# Patient Record
Sex: MALE | Race: WHITE | NOT HISPANIC OR LATINO | Employment: OTHER | URBAN - METROPOLITAN AREA
[De-identification: names, ages, dates, MRNs, and addresses within clinical notes are randomized per-mention and may not be internally consistent; named-entity substitution may affect disease eponyms.]

---

## 2017-05-08 ENCOUNTER — CARE COORDINATION (OUTPATIENT)
Dept: CARDIOLOGY | Facility: CLINIC | Age: 62
End: 2017-05-08

## 2017-05-08 DIAGNOSIS — I42.9 CARDIOMYOPATHY, UNSPECIFIED (H): Primary | ICD-10-CM

## 2017-05-08 NOTE — PROGRESS NOTES
Pt is being referred to Dr. Montgomery for heart failure from Dr. Buddy Molina at [facility].  I have left message this afternoon for patient to call me to update his demographics in EMR and to obtain updated insurance information.  Once I speak with patient, we will coordinate an appt with Dr. Montgomery soon as she has recommended.

## 2017-05-09 ENCOUNTER — CARE COORDINATION (OUTPATIENT)
Dept: CARDIOLOGY | Facility: CLINIC | Age: 62
End: 2017-05-09

## 2017-05-09 DIAGNOSIS — I50.9 CHF (CONGESTIVE HEART FAILURE) (H): Primary | ICD-10-CM

## 2017-05-10 NOTE — PROGRESS NOTES
Spoke with pt at length yesterday.  Called back and left message that his appt with Dr. Montgomery is scheduled for this Friday, 5/12 at 9:30 am. He should arrive at 9 am for labs prior to appt.      I have called [facility] Med Rec Dept at [phone] to request his records be fax'd to us at [phone].

## 2017-05-11 ENCOUNTER — PRE VISIT (OUTPATIENT)
Dept: CARDIOLOGY | Facility: CLINIC | Age: 62
End: 2017-05-11

## 2017-05-11 DIAGNOSIS — I42.8 NICM (NONISCHEMIC CARDIOMYOPATHY) (H): ICD-10-CM

## 2017-05-11 PROBLEM — E11.9 DIABETES MELLITUS (H): Status: ACTIVE | Noted: 2017-05-11

## 2017-05-11 PROBLEM — I47.29 PAROXYSMAL VT (H): Status: ACTIVE | Noted: 2017-05-11

## 2017-05-11 PROBLEM — N18.30 CKD (CHRONIC KIDNEY DISEASE) STAGE 3, GFR 30-59 ML/MIN (H): Status: ACTIVE | Noted: 2017-05-11

## 2017-05-11 PROBLEM — F32.A DEPRESSION: Status: ACTIVE | Noted: 2017-05-11

## 2017-05-11 PROBLEM — G47.33 OSA (OBSTRUCTIVE SLEEP APNEA): Status: ACTIVE | Noted: 2017-05-11

## 2017-05-11 PROBLEM — Z95.810 ICD (IMPLANTABLE CARDIOVERTER-DEFIBRILLATOR), BIVENTRICULAR, IN SITU: Status: ACTIVE | Noted: 2017-05-11

## 2017-05-11 PROBLEM — I48.0 PAROXYSMAL ATRIAL FIBRILLATION (H): Status: ACTIVE | Noted: 2017-05-11

## 2017-05-11 PROBLEM — I10 BENIGN ESSENTIAL HYPERTENSION: Status: ACTIVE | Noted: 2017-05-11

## 2017-05-11 PROBLEM — E53.8 VITAMIN B12 DEFICIENCY (NON ANEMIC): Status: ACTIVE | Noted: 2017-05-11

## 2017-05-11 RX ORDER — LOSARTAN POTASSIUM 50 MG/1
50 TABLET ORAL
COMMUNITY
End: 2017-05-12

## 2017-05-11 RX ORDER — DIGOXIN 125 MCG
125 TABLET ORAL DAILY
Status: ON HOLD | COMMUNITY
End: 2017-06-13

## 2017-05-11 RX ORDER — IPRATROPIUM BROMIDE AND ALBUTEROL SULFATE 2.5; .5 MG/3ML; MG/3ML
3 SOLUTION RESPIRATORY (INHALATION) EVERY 4 HOURS PRN
Status: ON HOLD | COMMUNITY
End: 2020-01-23

## 2017-05-11 RX ORDER — CYANOCOBALAMIN 1000 UG/ML
1000 INJECTION, SOLUTION INTRAMUSCULAR; SUBCUTANEOUS
Status: ON HOLD | COMMUNITY
End: 2021-07-09

## 2017-05-11 RX ORDER — CITALOPRAM HYDROBROMIDE 20 MG/1
30 TABLET ORAL AT BEDTIME
Status: ON HOLD | COMMUNITY
End: 2020-09-27

## 2017-05-11 RX ORDER — WARFARIN SODIUM 5 MG/1
5 TABLET ORAL SEE ADMIN INSTRUCTIONS
Status: ON HOLD | COMMUNITY
End: 2017-06-13

## 2017-05-11 RX ORDER — BUMETANIDE 2 MG/1
2 TABLET ORAL DAILY
Status: ON HOLD | COMMUNITY
End: 2017-05-25

## 2017-05-11 RX ORDER — DIPHENHYDRAMINE HCL 25 MG
25 CAPSULE ORAL
Status: ON HOLD | COMMUNITY
End: 2017-06-30

## 2017-05-11 RX ORDER — METOPROLOL SUCCINATE 100 MG/1
100 TABLET, EXTENDED RELEASE ORAL DAILY
COMMUNITY
End: 2017-05-12

## 2017-05-11 RX ORDER — SPIRONOLACTONE 25 MG/1
25 TABLET ORAL 2 TIMES DAILY
Status: ON HOLD | COMMUNITY
End: 2017-06-13

## 2017-05-11 RX ORDER — AMIODARONE HYDROCHLORIDE 200 MG/1
100 TABLET ORAL DAILY
Status: ON HOLD | COMMUNITY
End: 2017-06-11

## 2017-05-11 RX ORDER — BENZONATATE 100 MG/1
200 CAPSULE ORAL 3 TIMES DAILY PRN
COMMUNITY
End: 2017-06-15

## 2017-05-11 RX ORDER — ALBUTEROL SULFATE 90 UG/1
2 AEROSOL, METERED RESPIRATORY (INHALATION) EVERY 4 HOURS PRN
Status: ON HOLD | COMMUNITY
End: 2021-05-31

## 2017-05-11 RX ORDER — TRAMADOL HYDROCHLORIDE 50 MG/1
100 TABLET ORAL EVERY 6 HOURS PRN
Status: ON HOLD | COMMUNITY
End: 2017-06-30

## 2017-05-11 RX ORDER — ZINC SULFATE 50(220)MG
220 CAPSULE ORAL 2 TIMES DAILY
COMMUNITY
End: 2020-08-05

## 2017-05-12 ENCOUNTER — OFFICE VISIT (OUTPATIENT)
Dept: CARDIOLOGY | Facility: CLINIC | Age: 62
End: 2017-05-12
Attending: INTERNAL MEDICINE
Payer: COMMERCIAL

## 2017-05-12 VITALS
HEIGHT: 68 IN | DIASTOLIC BLOOD PRESSURE: 63 MMHG | BODY MASS INDEX: 39.75 KG/M2 | HEART RATE: 79 BPM | SYSTOLIC BLOOD PRESSURE: 98 MMHG | WEIGHT: 262.3 LBS | OXYGEN SATURATION: 94 %

## 2017-05-12 DIAGNOSIS — I50.22 CHRONIC SYSTOLIC HEART FAILURE (H): Primary | ICD-10-CM

## 2017-05-12 DIAGNOSIS — I50.9 CHF (CONGESTIVE HEART FAILURE) (H): ICD-10-CM

## 2017-05-12 LAB
ABO + RH BLD: NORMAL
ABO + RH BLD: NORMAL
ALBUMIN SERPL-MCNC: 3.6 G/DL (ref 3.4–5)
ALP SERPL-CCNC: 73 U/L (ref 40–150)
ALT SERPL W P-5'-P-CCNC: 58 U/L (ref 0–70)
ANION GAP SERPL CALCULATED.3IONS-SCNC: 10 MMOL/L (ref 3–14)
AST SERPL W P-5'-P-CCNC: 41 U/L (ref 0–45)
BILIRUB SERPL-MCNC: 1.1 MG/DL (ref 0.2–1.3)
BLOOD BANK CMNT PATIENT-IMP: NORMAL
BUN SERPL-MCNC: 36 MG/DL (ref 7–30)
CALCIUM SERPL-MCNC: 8.4 MG/DL (ref 8.5–10.1)
CHLORIDE SERPL-SCNC: 97 MMOL/L (ref 94–109)
CO2 SERPL-SCNC: 30 MMOL/L (ref 20–32)
CREAT SERPL-MCNC: 1.38 MG/DL (ref 0.66–1.25)
GFR SERPL CREATININE-BSD FRML MDRD: 53 ML/MIN/1.7M2
GLUCOSE SERPL-MCNC: 257 MG/DL (ref 70–99)
NT-PROBNP SERPL-MCNC: 2502 PG/ML (ref 0–125)
POTASSIUM SERPL-SCNC: 4.4 MMOL/L (ref 3.4–5.3)
PROT SERPL-MCNC: 6.5 G/DL (ref 6.8–8.8)
SODIUM SERPL-SCNC: 137 MMOL/L (ref 133–144)
SPECIMEN EXP DATE BLD: NORMAL

## 2017-05-12 PROCEDURE — 83880 ASSAY OF NATRIURETIC PEPTIDE: CPT | Performed by: INTERNAL MEDICINE

## 2017-05-12 PROCEDURE — 86900 BLOOD TYPING SEROLOGIC ABO: CPT | Performed by: INTERNAL MEDICINE

## 2017-05-12 PROCEDURE — 86901 BLOOD TYPING SEROLOGIC RH(D): CPT | Performed by: INTERNAL MEDICINE

## 2017-05-12 PROCEDURE — 80053 COMPREHEN METABOLIC PANEL: CPT | Performed by: INTERNAL MEDICINE

## 2017-05-12 PROCEDURE — 99203 OFFICE O/P NEW LOW 30 MIN: CPT | Mod: ZP | Performed by: INTERNAL MEDICINE

## 2017-05-12 PROCEDURE — 36415 COLL VENOUS BLD VENIPUNCTURE: CPT | Performed by: INTERNAL MEDICINE

## 2017-05-12 PROCEDURE — 99212 OFFICE O/P EST SF 10 MIN: CPT

## 2017-05-12 RX ORDER — METOPROLOL SUCCINATE 100 MG/1
100 TABLET, EXTENDED RELEASE ORAL
Qty: 30 TABLET | Refills: 1 | Status: SHIPPED | OUTPATIENT
Start: 2017-05-12 | End: 2017-05-18

## 2017-05-12 RX ORDER — METOPROLOL SUCCINATE 100 MG/1
100 TABLET, EXTENDED RELEASE ORAL
Qty: 30 TABLET | Refills: 1 | Status: SHIPPED | OUTPATIENT
Start: 2017-05-12 | End: 2017-05-12

## 2017-05-12 RX ORDER — LOSARTAN POTASSIUM 50 MG/1
25 TABLET ORAL
Qty: 30 TABLET | Refills: 1 | Status: ON HOLD | OUTPATIENT
Start: 2017-05-12 | End: 2017-06-13

## 2017-05-12 RX ORDER — HYDRALAZINE HYDROCHLORIDE 10 MG/1
10 TABLET, FILM COATED ORAL 3 TIMES DAILY
Qty: 120 TABLET | Refills: 1 | Status: ON HOLD | OUTPATIENT
Start: 2017-05-12 | End: 2017-06-30

## 2017-05-12 ASSESSMENT — PAIN SCALES - GENERAL: PAINLEVEL: NO PAIN (0)

## 2017-05-12 NOTE — PROGRESS NOTES
Dear Buddy,     I had the pleasure of seeing Jim Willingham in the [facility] on May 11, 2017. As you know he is a pleasant 61 year old male with a long standing history of non- ischemic cardiomyopathy who has been declining, particularly over the last year.     His last measured ejection fraction was in the 20 % percent range with a dilated LV (7.7 cm) and severe MR. He was just hospitalized for decompensated heart failure last week. He overall feels that he has no energy to do the things he wants to do, it is getting harder to watch his granddaughter (who they just adopted) and he is the primary care-giver. He has been having dizziness with walking. He has had one LOC earlier this year which was not thought to be  VT. He is able to lay flat presently but last week had increasing abdominal girth, PND, and orthopnea. He is short of breath with 1/2 block of walking. He avoids stairs. His afterload agents have been reduced recently due to low blood pressures.      He has been on excellent medical therapy for some and has had a Bi_V pacer since 2008. He denies recent palpitations and has had 3 ICD shocks since his ICD was placed.       PAST MEDICAL HISTORY:  Past Medical History:   Diagnosis Date     Benign essential hypertension 5/11/2017     Cardiomyopathy, unspecified (H) 5/8/2017     CKD (chronic kidney disease) stage 3, GFR 30-59 ml/min 5/11/2017     Depression 5/11/2017     Diabetes mellitus (H) 5/11/2017     H/O gastric bypass 5/11/2017     ICD (implantable cardioverter-defibrillator), biventricular, in situ 5/11/2017     NICM (nonischemic cardiomyopathy) (H)/ EF 20% 5/11/2017    ECHO: LVEDd. 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 They have several children (from separate partners) and they have just adopted a granddaughter who he is the primary care giver.     He stopped ETOH in  2010 and would drink socially before that but never had any life difficulties related to alcohol abuse.     He stopped smoking in 1994. No ilicits.     Family history: there is no family history of premature MI or SCD, or CHF      CURRENT MEDICATIONS:  Current Outpatient Prescriptions   Medication Sig Dispense Refill     benzonatate (TESSALON PERLES) 100 MG capsule Take 200 mg by mouth 3 times daily as needed for cough       albuterol (ALBUTEROL) 108 (90 BASE) MCG/ACT Inhaler Inhale 2 puffs into the lungs every 4 hours as needed for shortness of breath / dyspnea or wheezing       ipratropium - albuterol 0.5 mg/2.5 mg/3 mL (DUONEB) 0.5-2.5 (3) MG/3ML neb solution Take 3 mLs by nebulization every 4 hours as needed for shortness of breath / dyspnea or wheezing       amiodarone (PACERONE/CODARONE) 200 MG tablet Take 100 mg by mouth daily       bumetanide (BUMEX) 2 MG tablet Take 2 mg by mouth daily       citalopram (CELEXA) 20 MG tablet Take 20 mg by mouth daily       cyanocobalamin (VITAMIN B12) 1000 MCG/ML injection Inject 1,000 mcg into the muscle every 30 days       digoxin (DIGOX) 125 MCG tablet Take 125 mcg by mouth daily       diphenhydrAMINE (BENADRYL) 25 MG capsule Take 25 mg by mouth nightly as needed for itching or allergies       fluticasone-vilanterol (BREO ELLIPTA) 200-25 MCG/INH oral inhaler Inhale 1 puff into the lungs daily       losartan (COZAAR) 50 MG tablet Take 50 mg by mouth 2 times daily       Magnesium Oxide 250 MG TABS Take 250 mg by mouth daily       metFORMIN (GLUCOPHAGE) 1000 MG tablet Take 1,000 mg by mouth daily (with dinner)       metoprolol (TOPROL XL) 100 MG 24 hr tablet Take 100 mg by mouth daily 200 mg in AM, 100 mg in  "evening       montelukast (SINGULAIR) 10 MG tablet Take 10 mg by mouth At Bedtime       spironolactone (ALDACTONE) 25 MG tablet Take 25 mg by mouth 2 times daily       traMADol (ULTRAM) 50 MG tablet Take 100 mg by mouth every 6 hours as needed for moderate pain       umeclidinium (INCRUSE ELLIPTA) 62.5 MCG/INH oral inhaler Inhale 1 puff into the lungs daily       warfarin (COUMADIN) 5 MG tablet Take 5 mg by mouth See Admin Instructions 10 mg M,W,F, 7.5 mg all other days       zinc sulfate (ZINCATE) 220 (50 ZN) MG capsule Take 220 mg by mouth 2 times daily         ROS:   Constitutional: No fever, chills, or sweats. Weight is stable at present  ENT: No visual disturbance, ear ache, epistaxis, sore throat.   Allergies/Immunologic: Negative.   Respiratory: No cough, hemoptysis.   Cardiovascular: As per HPI.   GI: No nausea, vomiting, hematemesis, melena, or hematochezia.   : No urinary frequency, dysuria, or hematuria.   Integument: Negative.   Psychiatric: worried about the state of his health    Neuro: Negative.   Endocrinology: Negative.   Musculoskeletal: Negative.    EXAM:  BP 98/63 (BP Location: Left arm, Patient Position: Chair, Cuff Size: Adult Regular)  Pulse 79  Ht 1.727 m (5' 8\")  Wt 119 kg (262 lb 4.8 oz)  SpO2 94%  BMI 39.88 kg/m2  General: appears comfortable, alert and articulate, obese   Head: normocephalic, atraumatic  Eyes: anicteric sclera, EOMI  Neck: no adenopathy  Orophyarynx: moist mucosa, no lesions, dentition intact  Heart: PMI diffuse, regular, S1/S2, no murmur, gallop, rub, estimated JVP 10 cm   Lungs: clear, no rales or wheezing  Abdomen: soft, non-tender, bowel sounds present, no hepatosplenomegaly + increased abdominal girth   Extremities: no clubbing, cyanosis, trace LE edema   Neurological: normal speech and affect, no gross motor deficits        Last coronary angiogram and RHC May 10, 2017       Pressures      Phase:Baseline      AO :  70.00 mmHg / 49.00 mmHg ( 57.00 mmHg )  @ " 10:39:59 AM            72.00 mmHg / 49.00 mmHg ( 58.00 mmHg )  @ 10:40:10 AM      LV :  83.00 mmHg / 16.00 mmHg / 23.00 mmHg  @ 10:46:50 AM      LV Pull back :  85.00 mmHg / 15.00 mmHg / 23.00 mmHg  @ 10:46:59 AM      AO Pull back :  86.00 mmHg / 51.00 mmHg ( 74.00 mmHg )  @ 10:47:06 AM      RA :  a wave = v wave = mean = 15.00 mmHg  @ 11:06:43 AM      RV :  44.00 mmHg / 15.00 mmHg / 18.00 mmHg  @ 11:06:27 AM      PA :  41.00 mmHg / 20.00 mmHg ( 32.00 mmHg )  @ 10:59:05 AM      PCW :  a wave = v wave = mean = 32.00 mmHg  @ 11:00:00 AM    Valves      Phase:Baseline      AV :  0.00 mmHg  @ 10:22:09 AM      AV Mean Gradient:  13.70 mmHg  @ 10:22:09 AM      AV Valve Area:  1.27 cm2  @ 10:22:09 AM      Cardiac Output      Phase:Baseline      Thermo Cardiac Output:  4.77 l/min  @ 10:22:09 AM    Flow      Phase:Baseline      Qp :  4.77 l/min  @ 10:22:09 AM      Qs :  4.77 l/min  @ 10:22:09 AM         Status       Last echocardiogram:    Severely increased left ventricular size. LVEDd is 7.66 cm   2. LV function is severely reduced. The visually estimated ejection fraction is 20%.   3. Septal motion consistent with conduction delay.   4. Restrictive pattern of diastolic filling consistent with severe diastolic dysfunction.   5. Severely dilated left atrium.   6. Severe mitral valve regurgitation.   7. Moderate pulmonary hypertension. PAP 48 mmHG plus RAP, or approximately 55-58 mmHG.   8. Compared to prior study (1/26/2016); The EF remains severely depressed at 20%. Mitral regurgitation has increased from moderate to severe, likely due to the patient's cardiomyopathy. PAP has increased from 22 mmHG plus RAP to 48 mmHG feliz RAP.   9. The rhythm is normal sinus.  ualized. Trace pulmonic valve regurgitation.    LVIDd (2D):     7.66 cm (3.4-5.7) LA Major diam,s, A2c 7.5 cm        Pulmonary function tests:     The Patient reportedly appeared to give maximal effort.  The PFT   testing was performed on calibrated equipment and  recorded in   compliance with the ATS/ERS Task Force Standardization of Lung   Function Testing. The PFT testing was performed in the sitting   position.    After acceptable spirograms had been obtained, the following   values were obtained and/or calculated:    Pre bronchodilator Spirometry:   SVC 2.35 L (61 %)  DLCO unc 16.89 ml/min/mmHg (59 %)  DLCOcor 17.57 ml/min/mmHg (62 %)  HGB 13.3 gm/dL    INTERPRETATION:    Slow vital capacity (SVC) is moderately reduced.    The DLCO is corrected for hemoglobin and is moderately reduced.    An isolated reduction in DLCO with normal lung volumes is seen in   patients with anemia, chronic pulmonary embolism, primary   pulmonary hypertension, carboxyhemoglobinemia, vasculitis or   early interstitial lung disease.      ECG: (per north) V pacing rate 90     Labs:    Lab Results   Component Value Date     05/12/2017    POTASSIUM 4.4 05/12/2017    CHLORIDE 97 05/12/2017    CO2 30 05/12/2017    BUN 36 (H) 05/12/2017    CR 1.38 (H) 05/12/2017     (H) 05/12/2017    NTBNP 2502 (H) 05/12/2017    AST 41 05/12/2017    ALT 58 05/12/2017    ALKPHOS 73 05/12/2017    BILITOTAL 1.1 05/12/2017       Assessment and Plan:   In summary this is a very pleasant 61 M with a history of NICM which has been long standing who is referred for consideration of advanced therapies. 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 He has decreasing neurohormonal blockade tolerance, he has several ER visits and admissions for heart failure, his LV is close to 8 cm, his renal function has been slowly worsening over time, he had a troponin elevation during his last admission, and his functional status is poor (class IIIB- IV)  despite diuresis and medical optimization. His cardiac output was also low on his right heart catheterization. I do not the mitral clip is an option in his case and it would not fix the  underlying myocardial problem.     He is definitively sick enough that we need to be thinking about LVAD/transplant evaluation. He has blood group O (longest wait for cardiac transplant) and his BMI is likely going to be prohibitive for a direct to transplant approach in the time frame that he is going to need advanced therapies (our cut of is less than BMI 37).    What I would propose is the following. I would like to admit him for medical optimization as well as and work up for both LVAD and transplant. Given he is such a young man without severe co-morbidities our goal will be eventual transplant in his case, if possible.     1 hour was spent discussing the work up for advanced therapies, introducing the concepts of LVAD and transplant. We will continue to discuss all of this going forward.     Other:   VT: on low dose amio, will need repeat PFTs,thyroid function     History of paroxysmal a fib: not on anti-coag- will need to find out why- it is possible that he has been in sinus for a long time     History of asthma/COPD: will need repeat PFTs as above although last tests were not prohibitive for LVAD/transplant and he is not on oxygen       Medication changes:    Decrease metoprolol to 100 mg twice a day.   Start hydralazine 10 mg three times a day     We will reach out to him for admission next week and please call me with any questions.     Delisa Montgomery MD              CC    Chase Molina MD   [facility]

## 2017-05-12 NOTE — LETTER
5/12/2017      RE: Jim Willingham  [address] [place] [id number]       Dear Colleague,    Thank you for the opportunity to participate in the care of your patient, Jim Willingham, at the [facility]. Please see a copy of my visit note below.      Dear Buddy,     I had the pleasure of seeing Jim Willingham in the [facility] on May 11, 2017. As you know he is a pleasant 61 year old male with a long standing history of non- ischemic cardiomyopathy who has been declining, particularly over the last year.     His last measured ejection fraction was in the 20 % percent range with a dilated LV (7.7 cm) and severe MR. He was just hospitalized for decompensated heart failure last week. He overall feels that he has no energy to do the things he wants to do, it is getting harder to watch his granddaughter (who they just adopted) and he is the primary care-giver. He has been having dizziness with walking. He has had one LOC earlier this year which was not thought to be  VT. He is able to lay flat presently but last week had increasing abdominal girth, PND, and orthopnea. He is short of breath with 1/2 block of walking. He avoids stairs. His afterload agents have been reduced recently due to low blood pressures.      He has been on excellent medical therapy for some and has had a Bi_V pacer since 2008. He denies recent palpitations and has had 3 ICD shocks since his ICD was placed.       PAST MEDICAL HISTORY:  Past Medical History:   Diagnosis Date     Benign essential hypertension 5/11/2017     Cardiomyopathy, unspecified (H) 5/8/2017     CKD (chronic kidney disease) stage 3, GFR 30-59 ml/min 5/11/2017     Depression 5/11/2017     Diabetes mellitus (H) 5/11/2017     H/O gastric bypass 5/11/2017     ICD (implantable cardioverter-defibrillator), biventricular, in situ 5/11/2017     NICM (nonischemic cardiomyopathy) (H)/  EF 20% 5/11/2017    ECHO: LVEDd. 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 They have several children (from separate partners) and they have just adopted a granddaughter who he is the primary care giver.     He stopped ETOH in  2010 and would drink socially before that but never had any life difficulties related to alcohol abuse.     He stopped smoking in 1994. No ilicits.     Family history: there is no family history of premature MI or SCD, or CHF      CURRENT MEDICATIONS:  Current Outpatient Prescriptions   Medication Sig Dispense Refill     benzonatate (TESSALON PERLES) 100 MG capsule Take 200 mg by mouth 3 times daily as needed for cough       albuterol (ALBUTEROL) 108 (90 BASE) MCG/ACT Inhaler Inhale 2 puffs into the lungs every 4 hours as needed for shortness of breath / dyspnea or wheezing       ipratropium - albuterol 0.5 mg/2.5 mg/3 mL (DUONEB) 0.5-2.5 (3) MG/3ML neb solution Take 3 mLs by nebulization every 4 hours as needed for shortness of breath / dyspnea or wheezing       amiodarone (PACERONE/CODARONE) 200 MG tablet Take 100 mg by mouth daily       bumetanide (BUMEX) 2 MG tablet Take 2 mg by mouth daily       citalopram (CELEXA) 20 MG tablet Take 20 mg by mouth daily       cyanocobalamin (VITAMIN B12) 1000 MCG/ML injection Inject 1,000 mcg into the muscle every 30 days       digoxin (DIGOX) 125 MCG tablet Take 125 mcg by mouth daily       diphenhydrAMINE (BENADRYL) 25 MG capsule Take 25 mg by mouth nightly as needed for itching or allergies       fluticasone-vilanterol (BREO ELLIPTA) 200-25 MCG/INH oral inhaler Inhale 1 puff into the lungs daily       losartan  "(COZAAR) 50 MG tablet Take 50 mg by mouth 2 times daily       Magnesium Oxide 250 MG TABS Take 250 mg by mouth daily       metFORMIN (GLUCOPHAGE) 1000 MG tablet Take 1,000 mg by mouth daily (with dinner)       metoprolol (TOPROL XL) 100 MG 24 hr tablet Take 100 mg by mouth daily 200 mg in AM, 100 mg in evening       montelukast (SINGULAIR) 10 MG tablet Take 10 mg by mouth At Bedtime       spironolactone (ALDACTONE) 25 MG tablet Take 25 mg by mouth 2 times daily       traMADol (ULTRAM) 50 MG tablet Take 100 mg by mouth every 6 hours as needed for moderate pain       umeclidinium (INCRUSE ELLIPTA) 62.5 MCG/INH oral inhaler Inhale 1 puff into the lungs daily       warfarin (COUMADIN) 5 MG tablet Take 5 mg by mouth See Admin Instructions 10 mg M,W,F, 7.5 mg all other days       zinc sulfate (ZINCATE) 220 (50 ZN) MG capsule Take 220 mg by mouth 2 times daily         ROS:   Constitutional: No fever, chills, or sweats. Weight is stable at present  ENT: No visual disturbance, ear ache, epistaxis, sore throat.   Allergies/Immunologic: Negative.   Respiratory: No cough, hemoptysis.   Cardiovascular: As per HPI.   GI: No nausea, vomiting, hematemesis, melena, or hematochezia.   : No urinary frequency, dysuria, or hematuria.   Integument: Negative.   Psychiatric: worried about the state of his health    Neuro: Negative.   Endocrinology: Negative.   Musculoskeletal: Negative.    EXAM:  BP 98/63 (BP Location: Left arm, Patient Position: Chair, Cuff Size: Adult Regular)  Pulse 79  Ht 1.727 m (5' 8\")  Wt 119 kg (262 lb 4.8 oz)  SpO2 94%  BMI 39.88 kg/m2  General: appears comfortable, alert and articulate, obese   Head: normocephalic, atraumatic  Eyes: anicteric sclera, EOMI  Neck: no adenopathy  Orophyarynx: moist mucosa, no lesions, dentition intact  Heart: PMI diffuse, regular, S1/S2, no murmur, gallop, rub, estimated JVP 10 cm   Lungs: clear, no rales or wheezing  Abdomen: soft, non-tender, bowel sounds present, no " hepatosplenomegaly + increased abdominal girth   Extremities: no clubbing, cyanosis, trace LE edema   Neurological: normal speech and affect, no gross motor deficits        Last coronary angiogram and RHC May 10, 2017       Pressures      Phase:Baseline      AO :  70.00 mmHg / 49.00 mmHg ( 57.00 mmHg )  @ 10:39:59 AM            72.00 mmHg / 49.00 mmHg ( 58.00 mmHg )  @ 10:40:10 AM      LV :  83.00 mmHg / 16.00 mmHg / 23.00 mmHg  @ 10:46:50 AM      LV Pull back :  85.00 mmHg / 15.00 mmHg / 23.00 mmHg  @ 10:46:59 AM      AO Pull back :  86.00 mmHg / 51.00 mmHg ( 74.00 mmHg )  @ 10:47:06 AM      RA :  a wave = v wave = mean = 15.00 mmHg  @ 11:06:43 AM      RV :  44.00 mmHg / 15.00 mmHg / 18.00 mmHg  @ 11:06:27 AM      PA :  41.00 mmHg / 20.00 mmHg ( 32.00 mmHg )  @ 10:59:05 AM      PCW :  a wave = v wave = mean = 32.00 mmHg  @ 11:00:00 AM    Valves      Phase:Baseline      AV :  0.00 mmHg  @ 10:22:09 AM      AV Mean Gradient:  13.70 mmHg  @ 10:22:09 AM      AV Valve Area:  1.27 cm2  @ 10:22:09 AM      Cardiac Output      Phase:Baseline      Thermo Cardiac Output:  4.77 l/min  @ 10:22:09 AM    Flow      Phase:Baseline      Qp :  4.77 l/min  @ 10:22:09 AM      Qs :  4.77 l/min  @ 10:22:09 AM         Status       Last echocardiogram:    Severely increased left ventricular size. LVEDd is 7.66 cm   2. LV function is severely reduced. The visually estimated ejection fraction is 20%.   3. Septal motion consistent with conduction delay.   4. Restrictive pattern of diastolic filling consistent with severe diastolic dysfunction.   5. Severely dilated left atrium.   6. Severe mitral valve regurgitation.   7. Moderate pulmonary hypertension. PAP 48 mmHG plus RAP, or approximately 55-58 mmHG.   8. Compared to prior study (1/26/2016); The EF remains severely depressed at 20%. Mitral regurgitation has increased from moderate to severe, likely due to the patient's cardiomyopathy. PAP has increased from 22 mmHG plus RAP to 48 mmHG feliz  RAP.   9. The rhythm is normal sinus.  ualized. Trace pulmonic valve regurgitation.    LVIDd (2D):     7.66 cm (3.4-5.7) LA Major diam,s, A2c 7.5 cm        Pulmonary function tests:     The Patient reportedly appeared to give maximal effort.  The PFT   testing was performed on calibrated equipment and recorded in   compliance with the ATS/ERS Task Force Standardization of Lung   Function Testing. The PFT testing was performed in the sitting   position.    After acceptable spirograms had been obtained, the following   values were obtained and/or calculated:    Pre bronchodilator Spirometry:   SVC 2.35 L (61 %)  DLCO unc 16.89 ml/min/mmHg (59 %)  DLCOcor 17.57 ml/min/mmHg (62 %)  HGB 13.3 gm/dL    INTERPRETATION:    Slow vital capacity (SVC) is moderately reduced.    The DLCO is corrected for hemoglobin and is moderately reduced.    An isolated reduction in DLCO with normal lung volumes is seen in   patients with anemia, chronic pulmonary embolism, primary   pulmonary hypertension, carboxyhemoglobinemia, vasculitis or   early interstitial lung disease.      ECG: (per north) V pacing rate 90     Labs:    Lab Results   Component Value Date     05/12/2017    POTASSIUM 4.4 05/12/2017    CHLORIDE 97 05/12/2017    CO2 30 05/12/2017    BUN 36 (H) 05/12/2017    CR 1.38 (H) 05/12/2017     (H) 05/12/2017    NTBNP 2502 (H) 05/12/2017    AST 41 05/12/2017    ALT 58 05/12/2017    ALKPHOS 73 05/12/2017    BILITOTAL 1.1 05/12/2017       Assessment and Plan:   In summary this is a very pleasant 61 M with a history of NICM which has been long standing who is referred for consideration of advanced therapies. 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 He has decreasing neurohormonal blockade tolerance, he has several ER visits and admissions for heart failure, his LV is close to 8 cm, his renal function has been slowly worsening  over time, he had a troponin elevation during his last admission, and his functional status is poor (class IIIB- IV)  despite diuresis and medical optimization. His cardiac output was also low on his right heart catheterization. I do not the mitral clip is an option in his case and it would not fix the underlying myocardial problem.     He is definitively sick enough that we need to be thinking about LVAD/transplant evaluation. He has blood group O (longest wait for cardiac transplant) and his BMI is likely going to be prohibitive for a direct to transplant approach in the time frame that he is going to need advanced therapies (our cut of is less than BMI 37).    What I would propose is the following. I would like to admit him for medical optimization as well as and work up for both LVAD and transplant. Given he is such a young man without severe co-morbidities our goal will be eventual transplant in his case, if possible.     1 hour was spent discussing the work up for advanced therapies, introducing the concepts of LVAD and transplant. We will continue to discuss all of this going forward.     Other:   VT: on low dose amio, will need repeat PFTs,thyroid function     History of paroxysmal a fib: not on anti-coag- will need to find out why- it is possible that he has been in sinus for a long time     History of asthma/COPD: will need repeat PFTs as above although last tests were not prohibitive for LVAD/transplant and he is not on oxygen       Medication changes:    Decrease metoprolol to 100 mg twice a day.   Start hydralazine 10 mg three times a day     We will reach out to him for admission next week and please call me with any questions.     Delisa Montgomery MD         CC    Chase Molina MD   [facility]

## 2017-05-12 NOTE — PATIENT INSTRUCTIONS
Thanks for coming in today.     Decrease metoprolol to 100 mg twice a day.   Start hydralazine 10 mg three times a day.     We will be reaching out to you to set up a time for admission.     [phone], option 3,1       Delisa Montgomery

## 2017-05-12 NOTE — MR AVS SNAPSHOT
"              After Visit Summary   5/12/2017    Jim Willingham    MRN: [id number]           Patient Information     Date Of Birth          1955        Visit Information        Provider Department      5/12/2017 9:30 AM Delisa Montgomery MD [facility]        Today's Diagnoses     Chronic systolic heart failure (H)    -  1      Care Instructions    Thanks for coming in today.     Decrease metoprolol to 100 mg twice a day.   Start hydralazine 10 mg three times a day.     We will be reaching out to you to set up a time for admission.     [phone], option 3,1       Delisa Montgomery          Follow-ups after your visit        Who to contact     If you have questions or need follow up information about today's clinic visit or your schedule please contact [facility] directly at [phone].  Normal or non-critical lab and imaging results will be communicated to you by Dothart, letter or phone within 4 business days after the clinic has received the results. If you do not hear from us within 7 days, please contact the clinic through Dothart or phone. If you have a critical or abnormal lab result, we will notify you by phone as soon as possible.  Submit refill requests through Adspringr or call your pharmacy and they will forward the refill request to us. Please allow 3 business days for your refill to be completed.          Additional Information About Your Visit        Dothart Information     Adspringr lets you send messages to your doctor, view your test results, renew your prescriptions, schedule appointments and more. To sign up, go to [email or web address] . Click on \"Log in\" on the left side of the screen, which will take you to the Welcome page. Then click on \"Sign up Now\" on the right side of the page.     You will be asked to enter the access code listed below, as well as some personal information. Please follow the directions to create your username and password.     Your " "access code is: [email or web address]  Expires: 2017  6:30 AM     Your access code will  in 90 days. If you need help or a new code, please call your [place] clinic or [phone].        Care EveryWhere ID     This is your Care EveryWhere ID. This could be used by other organizations to access your [place] medical records  [id number]        Your Vitals Were     Pulse Height Pulse Oximetry BMI (Body Mass Index)          79 1.727 m (5' 8\") 94% 39.88 kg/m2         Blood Pressure from Last 3 Encounters:   17 98/63    Weight from Last 3 Encounters:   17 119 kg (262 lb 4.8 oz)              Today, you had the following     No orders found for display         Today's Medication Changes          These changes are accurate as of: 17 11:02 AM.  If you have any questions, ask your nurse or doctor.               Start taking these medicines.        Dose/Directions    hydrALAZINE 10 MG tablet   Commonly known as:  APRESOLINE   Used for:  Chronic systolic heart failure (H)   Started by:  Delisa Montgomery MD        Dose:  10 mg   Take 1 tablet (10 mg) by mouth 3 times daily   Quantity:  120 tablet   Refills:  1       metoprolol 100 MG 24 hr tablet   Commonly known as:  TOPROL XL   Indication:  2 tabs in AM, 1 tab in evening   Used for:  Chronic systolic heart failure (H)   Started by:  Delisa Montgomery MD        Dose:  100 mg   Take 1 tablet (100 mg) by mouth 2 times daily   Quantity:  30 tablet   Refills:  1         These medicines have changed or have updated prescriptions.        Dose/Directions    losartan 50 MG tablet   Commonly known as:  COZAAR   This may have changed:  how much to take   Used for:  Chronic systolic heart failure (H)   Changed by:  Delisa Montgomery MD        Dose:  25 mg   Take 0.5 tablets (25 mg) by mouth 2 times daily   Quantity:  30 tablet   Refills:  1            Where to get your medicines      These medications were sent to [id number] - " [place] - [address] [place] AT [facility] [address]  [address] [place] [address]     Phone:  [phone]     hydrALAZINE 10 MG tablet    losartan 50 MG tablet    metoprolol 100 MG 24 hr tablet                Primary Care Provider    None Specified       No primary provider on file.        Thank you!     Thank you for choosing [facility]  for your care. Our goal is always to provide you with excellent care. Hearing back from our patients is one way we can continue to improve our services. Please take a few minutes to complete the written survey that you may receive in the mail after your visit with us. Thank you!             Your Updated Medication List - Protect others around you: Learn how to safely use, store and throw away your medicines at www.disposemymeds.org.          This list is accurate as of: 5/12/17 11:02 AM.  Always use your most recent med list.                   Brand Name Dispense Instructions for use    albuterol 108 (90 BASE) MCG/ACT Inhaler   Generic drug:  albuterol      Inhale 2 puffs into the lungs every 4 hours as needed for shortness of breath / dyspnea or wheezing       amiodarone 200 MG tablet    PACERONE/CODARONE     Take 100 mg by mouth daily       BENADRYL 25 MG capsule   Generic drug:  diphenhydrAMINE      Take 25 mg by mouth nightly as needed for itching or allergies       BREO ELLIPTA 200-25 MCG/INH oral inhaler   Generic drug:  fluticasone-vilanterol      Inhale 1 puff into the lungs daily       BUMEX 2 MG tablet   Generic drug:  bumetanide      Take 2 mg by mouth daily       celeXA 20 MG tablet   Generic drug:  citalopram      Take 20 mg by mouth daily       cyanocobalamin 1000 MCG/ML injection    VITAMIN B12     Inject 1,000 mcg into the muscle every 30 days       DIGOX 125 MCG tablet   Generic drug:  digoxin      Take 125 mcg by mouth daily       GLUCOPHAGE 1000 MG tablet   Generic drug:  metFORMIN      Take 1,000 mg by mouth daily (with  dinner)       hydrALAZINE 10 MG tablet    APRESOLINE    120 tablet    Take 1 tablet (10 mg) by mouth 3 times daily       INCRUSE ELLIPTA 62.5 MCG/INH oral inhaler   Generic drug:  umeclidinium      Inhale 1 puff into the lungs daily       ipratropium - albuterol 0.5 mg/2.5 mg/3 mL 0.5-2.5 (3) MG/3ML neb solution    DUONEB     Take 3 mLs by nebulization every 4 hours as needed for shortness of breath / dyspnea or wheezing       losartan 50 MG tablet    COZAAR    30 tablet    Take 0.5 tablets (25 mg) by mouth 2 times daily       Magnesium Oxide 250 MG Tabs      Take 250 mg by mouth daily       metoprolol 100 MG 24 hr tablet    TOPROL XL    30 tablet    Take 1 tablet (100 mg) by mouth 2 times daily       SINGULAIR 10 MG tablet   Generic drug:  montelukast      Take 10 mg by mouth At Bedtime       spironolactone 25 MG tablet    ALDACTONE     Take 25 mg by mouth 2 times daily       TESSALON PERLES 100 MG capsule   Generic drug:  benzonatate      Take 200 mg by mouth 3 times daily as needed for cough       traMADol 50 MG tablet    ULTRAM     Take 100 mg by mouth every 6 hours as needed for moderate pain       warfarin 5 MG tablet    COUMADIN     Take 5 mg by mouth See Admin Instructions 10 mg M,W,F, 7.5 mg all other days       zinc sulfate 220 (50 ZN) MG capsule    ZINCATE     Take 220 mg by mouth 2 times daily

## 2017-05-12 NOTE — NURSING NOTE
Chief Complaint   Patient presents with     New Patient     Ref. Dr. Molina at [place] for evaluation of HF

## 2017-05-17 DIAGNOSIS — I50.20 SYSTOLIC HEART FAILURE (H): Primary | ICD-10-CM

## 2017-05-17 RX ORDER — LIDOCAINE 40 MG/G
CREAM TOPICAL
Status: CANCELLED | OUTPATIENT
Start: 2017-05-17

## 2017-05-17 NOTE — PROGRESS NOTES
"Needs VAD/Transplant evaluation:  [facility] referral from Dr Buddy Molina, seen by Dr Montgomery in clinic last week.  Her recommendation is that Mr Willingham undergo a formal VAD/Transplant evaluation.  Will plan for right heart cath and admit for \"tune-up\" of his heart failure, with simultaneous hybrid evaluation.  The patient's spouse is off work for a week beginning 5/23, so it would be most convenient for the family to start the eval process that week. As he is on Coumadin for a history of atrial arrhythmias, the pt will be instructed to take his last dose of coumadin on Sat. 5-20-17.  VAD team aware of above as well.    "

## 2017-05-18 DIAGNOSIS — I50.22 CHRONIC SYSTOLIC HEART FAILURE (H): ICD-10-CM

## 2017-05-18 RX ORDER — METOPROLOL SUCCINATE 100 MG/1
100 TABLET, EXTENDED RELEASE ORAL
Qty: 60 TABLET | Refills: 5 | Status: ON HOLD | OUTPATIENT
Start: 2017-05-18 | End: 2017-06-13

## 2017-05-18 NOTE — TELEPHONE ENCOUNTER
Last visit 5/12/17 [place]  Next visit 5/23/17 [place]  Plan  Assessment and Plan:   In summary this is a very pleasant 61 M with a history of NICM which has been long standing who is referred for consideration of advanced therapies. 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 He has decreasing neurohormonal blockade tolerance, he has several ER visits and admissions for heart failure, his LV is close to 8 cm, his renal function has been slowly worsening over time, he had a troponin elevation during his last admission, and his functional status is poor (class IIIB- IV) despite diuresis and medical optimization. His cardiac output was also low on his right heart catheterization. I do not the mitral clip is an option in his case and it would not fix the underlying myocardial problem.      He is definitively sick enough that we need to be thinking about LVAD/transplant evaluation. He has blood group O (longest wait for cardiac transplant) and his BMI is likely going to be prohibitive for a direct to transplant approach in the time frame that he is going to need advanced therapies (our cut of is less than BMI 37).     What I would propose is the following. I would like to admit him for medical optimization as well as and work up for both LVAD and transplant. Given he is such a young man without severe co-morbidities our goal will be eventual transplant in his case, if possible.      1 hour was spent discussing the work up for advanced therapies, introducing the concepts of LVAD and transplant. We will continue to discuss all of this going forward.      Other:   VT: on low dose amio, will need repeat PFTs,thyroid function      History of paroxysmal a fib: not on anti-coag- will need to find out why- it is possible that he has been in sinus for a long time      History of asthma/COPD: will need repeat PFTs as above although  last tests were not prohibitive for LVAD/transplant and he is not on oxygen         Medication changes:   Decrease metoprolol to 100 mg twice a day.   Start hydralazine 10 mg three times a day      We will reach out to him for admission next week and please call me with any questions.      Delisa Montgomery MD

## 2017-05-18 NOTE — TELEPHONE ENCOUNTER
Metoprolol ER succinate 100 mg    Take 1 tablet by mouth twice 200 mg in am and 100 mg every evening

## 2017-05-23 ENCOUNTER — HOSPITAL ENCOUNTER (INPATIENT)
Facility: CLINIC | Age: 62
LOS: 3 days | Discharge: HOME | DRG: 287 | End: 2017-05-26
Attending: INTERNAL MEDICINE | Admitting: INTERNAL MEDICINE
Payer: COMMERCIAL

## 2017-05-23 ENCOUNTER — APPOINTMENT (OUTPATIENT)
Dept: CT IMAGING | Facility: CLINIC | Age: 62
DRG: 287 | End: 2017-05-23
Attending: INTERNAL MEDICINE
Payer: COMMERCIAL

## 2017-05-23 ENCOUNTER — APPOINTMENT (OUTPATIENT)
Dept: GENERAL RADIOLOGY | Facility: CLINIC | Age: 62
DRG: 287 | End: 2017-05-23
Attending: INTERNAL MEDICINE
Payer: COMMERCIAL

## 2017-05-23 ENCOUNTER — APPOINTMENT (OUTPATIENT)
Dept: CARDIOLOGY | Facility: CLINIC | Age: 62
DRG: 287 | End: 2017-05-23
Attending: INTERNAL MEDICINE
Payer: COMMERCIAL

## 2017-05-23 ENCOUNTER — APPOINTMENT (OUTPATIENT)
Dept: MEDSURG UNIT | Facility: CLINIC | Age: 62
DRG: 287 | End: 2017-05-23
Payer: COMMERCIAL

## 2017-05-23 DIAGNOSIS — I42.8 NICM (NONISCHEMIC CARDIOMYOPATHY) (H): Primary | ICD-10-CM

## 2017-05-23 DIAGNOSIS — I50.20 SYSTOLIC HEART FAILURE (H): ICD-10-CM

## 2017-05-23 DIAGNOSIS — M25.522 LEFT ELBOW PAIN: ICD-10-CM

## 2017-05-23 PROBLEM — I50.9 CHF (CONGESTIVE HEART FAILURE) (H): Status: ACTIVE | Noted: 2017-05-23

## 2017-05-23 LAB
ALBUMIN SERPL-MCNC: 3.5 G/DL (ref 3.4–5)
ALBUMIN UR-MCNC: NEGATIVE MG/DL
ALP SERPL-CCNC: 72 U/L (ref 40–150)
ALT SERPL W P-5'-P-CCNC: 44 U/L (ref 0–70)
ANION GAP SERPL CALCULATED.3IONS-SCNC: 8 MMOL/L (ref 3–14)
APPEARANCE UR: CLEAR
AST SERPL W P-5'-P-CCNC: 26 U/L (ref 0–45)
BILIRUB SERPL-MCNC: 1.1 MG/DL (ref 0.2–1.3)
BILIRUB UR QL STRIP: NEGATIVE
BUN SERPL-MCNC: 46 MG/DL (ref 7–30)
CALCIUM SERPL-MCNC: 8.5 MG/DL (ref 8.5–10.1)
CHLORIDE SERPL-SCNC: 101 MMOL/L (ref 94–109)
CO2 SERPL-SCNC: 27 MMOL/L (ref 20–32)
COLOR UR AUTO: YELLOW
CREAT SERPL-MCNC: 1.52 MG/DL (ref 0.66–1.25)
CRP SERPL-MCNC: 29 MG/L (ref 0–8)
ERYTHROCYTE [DISTWIDTH] IN BLOOD BY AUTOMATED COUNT: 14 % (ref 10–15)
GFR SERPL CREATININE-BSD FRML MDRD: 47 ML/MIN/1.7M2
GLUCOSE BLDC GLUCOMTR-MCNC: 107 MG/DL (ref 70–99)
GLUCOSE BLDC GLUCOMTR-MCNC: 95 MG/DL (ref 70–99)
GLUCOSE SERPL-MCNC: 123 MG/DL (ref 70–99)
GLUCOSE UR STRIP-MCNC: NEGATIVE MG/DL
GRAM STN SPEC: ABNORMAL
HCT VFR BLD AUTO: 36.2 % (ref 40–53)
HGB BLD-MCNC: 11.3 G/DL (ref 13.3–17.7)
HGB UR QL STRIP: NEGATIVE
INR PPP: 1.36 (ref 0.86–1.14)
KETONES UR STRIP-MCNC: NEGATIVE MG/DL
LEUKOCYTE ESTERASE UR QL STRIP: NEGATIVE
Lab: ABNORMAL
MAGNESIUM SERPL-MCNC: 1.7 MG/DL (ref 1.6–2.3)
MCH RBC QN AUTO: 29.8 PG (ref 26.5–33)
MCHC RBC AUTO-ENTMCNC: 31.2 G/DL (ref 31.5–36.5)
MCV RBC AUTO: 96 FL (ref 78–100)
MICRO REPORT STATUS: ABNORMAL
NITRATE UR QL: NEGATIVE
NT-PROBNP SERPL-MCNC: 1832 PG/ML (ref 0–900)
PH UR STRIP: 5.5 PH (ref 5–7)
PLATELET # BLD AUTO: 151 10E9/L (ref 150–450)
POTASSIUM SERPL-SCNC: 4.2 MMOL/L (ref 3.4–5.3)
PROT SERPL-MCNC: 6.6 G/DL (ref 6.8–8.8)
RBC # BLD AUTO: 3.79 10E12/L (ref 4.4–5.9)
RBC #/AREA URNS AUTO: <1 /HPF (ref 0–2)
SODIUM SERPL-SCNC: 135 MMOL/L (ref 133–144)
SP GR UR STRIP: 1.01 (ref 1–1.03)
SPECIMEN SOURCE: ABNORMAL
URN SPEC COLLECT METH UR: NORMAL
UROBILINOGEN UR STRIP-MCNC: 2 MG/DL (ref 0–2)
WBC # BLD AUTO: 7.5 10E9/L (ref 4–11)
WBC #/AREA URNS AUTO: <1 /HPF (ref 0–2)

## 2017-05-23 PROCEDURE — 27210807 ZZH SHEATH CR6

## 2017-05-23 PROCEDURE — 71020 XR CHEST 2 VW: CPT

## 2017-05-23 PROCEDURE — 81001 URINALYSIS AUTO W/SCOPE: CPT | Performed by: INTERNAL MEDICINE

## 2017-05-23 PROCEDURE — 00000146 ZZHCL STATISTIC GLUCOSE BY METER IP

## 2017-05-23 PROCEDURE — 83735 ASSAY OF MAGNESIUM: CPT | Performed by: INTERNAL MEDICINE

## 2017-05-23 PROCEDURE — 27210982 ZZH KIT RT HC TOTES DISP CR7

## 2017-05-23 PROCEDURE — 71010 XR CHEST PORT 1 VW: CPT

## 2017-05-23 PROCEDURE — 93451 RIGHT HEART CATH: CPT

## 2017-05-23 PROCEDURE — 87205 SMEAR GRAM STAIN: CPT | Performed by: INTERNAL MEDICINE

## 2017-05-23 PROCEDURE — 86140 C-REACTIVE PROTEIN: CPT | Performed by: INTERNAL MEDICINE

## 2017-05-23 PROCEDURE — 93005 ELECTROCARDIOGRAM TRACING: CPT

## 2017-05-23 PROCEDURE — 27210787 ZZH MANIFOLD CR2

## 2017-05-23 PROCEDURE — 25000125 ZZHC RX 250: Performed by: INTERNAL MEDICINE

## 2017-05-23 PROCEDURE — 80053 COMPREHEN METABOLIC PANEL: CPT | Performed by: INTERNAL MEDICINE

## 2017-05-23 PROCEDURE — 27211181 ZZH BALLOON TIP PRESSURE CR5

## 2017-05-23 PROCEDURE — 21400006 ZZH R&B CCU INTERMEDIATE UMMC

## 2017-05-23 PROCEDURE — 4A023N6 MEASUREMENT OF CARDIAC SAMPLING AND PRESSURE, RIGHT HEART, PERCUTANEOUS APPROACH: ICD-10-PCS | Performed by: INTERNAL MEDICINE

## 2017-05-23 PROCEDURE — 25000132 ZZH RX MED GY IP 250 OP 250 PS 637: Performed by: STUDENT IN AN ORGANIZED HEALTH CARE EDUCATION/TRAINING PROGRAM

## 2017-05-23 PROCEDURE — 85610 PROTHROMBIN TIME: CPT | Performed by: INTERNAL MEDICINE

## 2017-05-23 PROCEDURE — 93010 ELECTROCARDIOGRAM REPORT: CPT | Performed by: INTERNAL MEDICINE

## 2017-05-23 PROCEDURE — 87070 CULTURE OTHR SPECIMN AEROBIC: CPT | Performed by: INTERNAL MEDICINE

## 2017-05-23 PROCEDURE — 70450 CT HEAD/BRAIN W/O DYE: CPT

## 2017-05-23 PROCEDURE — 25000132 ZZH RX MED GY IP 250 OP 250 PS 637: Performed by: INTERNAL MEDICINE

## 2017-05-23 PROCEDURE — 93451 RIGHT HEART CATH: CPT | Mod: 26 | Performed by: INTERNAL MEDICINE

## 2017-05-23 PROCEDURE — 85027 COMPLETE CBC AUTOMATED: CPT | Performed by: INTERNAL MEDICINE

## 2017-05-23 PROCEDURE — C1894 INTRO/SHEATH, NON-LASER: HCPCS

## 2017-05-23 PROCEDURE — 36415 COLL VENOUS BLD VENIPUNCTURE: CPT | Performed by: INTERNAL MEDICINE

## 2017-05-23 PROCEDURE — 83880 ASSAY OF NATRIURETIC PEPTIDE: CPT | Performed by: INTERNAL MEDICINE

## 2017-05-23 PROCEDURE — 99223 1ST HOSP IP/OBS HIGH 75: CPT | Mod: 25 | Performed by: INTERNAL MEDICINE

## 2017-05-23 PROCEDURE — 40000172 ZZH STATISTIC PROCEDURE PREP ONLY

## 2017-05-23 RX ORDER — WARFARIN SODIUM 10 MG/1
10 TABLET ORAL
Status: DISCONTINUED | OUTPATIENT
Start: 2017-05-23 | End: 2017-05-23

## 2017-05-23 RX ORDER — LANOLIN ALCOHOL/MO/W.PET/CERES
3 CREAM (GRAM) TOPICAL
Status: DISCONTINUED | OUTPATIENT
Start: 2017-05-23 | End: 2017-05-26 | Stop reason: HOSPADM

## 2017-05-23 RX ORDER — NALOXONE HYDROCHLORIDE 0.4 MG/ML
.1-.4 INJECTION, SOLUTION INTRAMUSCULAR; INTRAVENOUS; SUBCUTANEOUS
Status: DISCONTINUED | OUTPATIENT
Start: 2017-05-23 | End: 2017-05-26 | Stop reason: HOSPADM

## 2017-05-23 RX ORDER — LIDOCAINE 40 MG/G
CREAM TOPICAL
Status: COMPLETED | OUTPATIENT
Start: 2017-05-23 | End: 2017-05-23

## 2017-05-23 RX ORDER — LOSARTAN POTASSIUM 25 MG/1
25 TABLET ORAL
Status: DISCONTINUED | OUTPATIENT
Start: 2017-05-23 | End: 2017-05-26 | Stop reason: HOSPADM

## 2017-05-23 RX ORDER — IPRATROPIUM BROMIDE AND ALBUTEROL SULFATE 2.5; .5 MG/3ML; MG/3ML
3 SOLUTION RESPIRATORY (INHALATION) EVERY 4 HOURS PRN
Status: DISCONTINUED | OUTPATIENT
Start: 2017-05-23 | End: 2017-05-26 | Stop reason: HOSPADM

## 2017-05-23 RX ORDER — DIGOXIN 125 MCG
125 TABLET ORAL DAILY
Status: DISCONTINUED | OUTPATIENT
Start: 2017-05-24 | End: 2017-05-26 | Stop reason: HOSPADM

## 2017-05-23 RX ORDER — HYDRALAZINE HYDROCHLORIDE 10 MG/1
10 TABLET, FILM COATED ORAL 3 TIMES DAILY
Status: DISCONTINUED | OUTPATIENT
Start: 2017-05-23 | End: 2017-05-26 | Stop reason: HOSPADM

## 2017-05-23 RX ORDER — ACETAMINOPHEN 325 MG/1
650 TABLET ORAL EVERY 4 HOURS PRN
Status: DISCONTINUED | OUTPATIENT
Start: 2017-05-23 | End: 2017-05-26

## 2017-05-23 RX ORDER — TRAZODONE HYDROCHLORIDE 50 MG/1
50 TABLET, FILM COATED ORAL AT BEDTIME
Status: DISCONTINUED | OUTPATIENT
Start: 2017-05-23 | End: 2017-05-26 | Stop reason: HOSPADM

## 2017-05-23 RX ORDER — SPIRONOLACTONE 25 MG/1
25 TABLET ORAL 2 TIMES DAILY
Status: DISCONTINUED | OUTPATIENT
Start: 2017-05-23 | End: 2017-05-26 | Stop reason: HOSPADM

## 2017-05-23 RX ORDER — METOPROLOL SUCCINATE 100 MG/1
100 TABLET, EXTENDED RELEASE ORAL
Status: DISCONTINUED | OUTPATIENT
Start: 2017-05-23 | End: 2017-05-26 | Stop reason: HOSPADM

## 2017-05-23 RX ORDER — CITALOPRAM HYDROBROMIDE 20 MG/1
20 TABLET ORAL DAILY
Status: DISCONTINUED | OUTPATIENT
Start: 2017-05-24 | End: 2017-05-26 | Stop reason: HOSPADM

## 2017-05-23 RX ORDER — MONTELUKAST SODIUM 10 MG/1
10 TABLET ORAL AT BEDTIME
Status: DISCONTINUED | OUTPATIENT
Start: 2017-05-23 | End: 2017-05-26 | Stop reason: HOSPADM

## 2017-05-23 RX ORDER — DEXTROSE MONOHYDRATE 25 G/50ML
25-50 INJECTION, SOLUTION INTRAVENOUS
Status: DISCONTINUED | OUTPATIENT
Start: 2017-05-23 | End: 2017-05-26 | Stop reason: HOSPADM

## 2017-05-23 RX ORDER — AMIODARONE HYDROCHLORIDE 100 MG/1
100 TABLET ORAL DAILY
Status: DISCONTINUED | OUTPATIENT
Start: 2017-05-24 | End: 2017-05-26 | Stop reason: HOSPADM

## 2017-05-23 RX ORDER — BUMETANIDE 2 MG/1
2 TABLET ORAL DAILY
Status: DISCONTINUED | OUTPATIENT
Start: 2017-05-24 | End: 2017-05-24

## 2017-05-23 RX ORDER — ALBUTEROL SULFATE 90 UG/1
2 AEROSOL, METERED RESPIRATORY (INHALATION) EVERY 4 HOURS PRN
Status: DISCONTINUED | OUTPATIENT
Start: 2017-05-23 | End: 2017-05-26 | Stop reason: HOSPADM

## 2017-05-23 RX ORDER — NICOTINE POLACRILEX 4 MG
15-30 LOZENGE BUCCAL
Status: DISCONTINUED | OUTPATIENT
Start: 2017-05-23 | End: 2017-05-26 | Stop reason: HOSPADM

## 2017-05-23 RX ADMIN — LIDOCAINE: 40 CREAM TOPICAL at 13:01

## 2017-05-23 RX ADMIN — MONTELUKAST SODIUM 10 MG: 10 TABLET, FILM COATED ORAL at 21:37

## 2017-05-23 RX ADMIN — METOPROLOL SUCCINATE 100 MG: 100 TABLET, EXTENDED RELEASE ORAL at 19:16

## 2017-05-23 RX ADMIN — MELATONIN TAB 3 MG 3 MG: 3 TAB at 21:38

## 2017-05-23 RX ADMIN — LOSARTAN POTASSIUM 25 MG: 25 TABLET, FILM COATED ORAL at 19:16

## 2017-05-23 RX ADMIN — SPIRONOLACTONE 25 MG: 25 TABLET ORAL at 19:15

## 2017-05-23 RX ADMIN — HYDRALAZINE HYDROCHLORIDE 10 MG: 10 TABLET, FILM COATED ORAL at 19:16

## 2017-05-23 RX ADMIN — Medication 75 MG: at 21:37

## 2017-05-23 ASSESSMENT — PAIN DESCRIPTION - DESCRIPTORS: DESCRIPTORS: ACHING

## 2017-05-23 NOTE — PHARMACY-ANTICOAGULATION SERVICE
Clinical Pharmacy - Warfarin Dosing Consult     Pharmacy has been consulted to manage this patient s warfarin therapy.  Indication: Atrial Fibrillation  Therapy Goal: INR 2-3  Warfarin Prior to Admission: Yes  Warfarin PTA Regimen: 10 mg MWF and 7.5 mg ROW    INR   Date Value Ref Range Status   05/23/2017 1.36 (H) 0.86 - 1.14 Final        Pt already took 7.5 mg warfarin dose this AM.   Pharmacy will monitor Jim Willingham daily and order warfarin doses to achieve specified goal.      Please contact pharmacy as soon as possible if the warfarin needs to be held for a procedure or if the warfarin goals change.

## 2017-05-23 NOTE — IP AVS SNAPSHOT
MRN:[id number]                      After Visit Summary   5/23/2017    Jim Willingham    MRN: [id number]           Thank you!     Thank you for choosing [facility] for your care. Our goal is always to provide you with excellent care. Hearing back from our patients is one way we can continue to improve our services. Please take a few minutes to complete the written survey that you may receive in the mail after you visit with us. Thank you!        Patient Information     Date Of Birth          1955        Designated Caregiver       Most Recent Value    Caregiver    Will someone help with your care after discharge? yes    Name of designated caregiver bart glez    Phone number of caregiver [phone]    Caregiver address [address] [place] [address]      About your hospital stay     You were admitted on:  May 23, 2017 You last received care in the:  [address] [place] [address]    You were discharged on:  May 26, 2017        Reason for your hospital stay       You were admitted for LVAD work up                  Who to Call     For medical emergencies, please call 911.  For non-urgent questions about your medical care, please call your primary care provider or clinic, [phone]          Attending Provider     Provider Specialty    Delisa Montgomery MD Cardiology    Clayton, Rose Mccarthy MD Cardiology       Primary Care Provider Office Phone # Fax #    Maya TIM Lamb [phone]       [facility] [address] [facility] [address]        After Care Instructions     Activity       Your activity upon discharge: advance as tolerated            Diet       Follow this diet upon discharge: cardiac diet            Discharge Instructions       Follow up BMP, INR in 1 week                  Follow-up Appointments     Adult [facility] Follow-up and recommended labs and tests       Follow up with Dr Montgomery in 1 week   BMP, INR in 1 week    Appointments on  "[place] and/or [facility] (with [facility] or [facility] provider or service). Call [phone] if you haven't heard regarding these appointments within 7 days of discharge.            Follow Up and recommended labs and tests       Follow up INR, BMP in 1 week                  Additional Services     Medication Therapy Management Referral       Reason for referral:  on more than 5 medications and managing chronic disease    This service is designed to help you get the most from your medications.  A specially trained pharmacist will work closely with you and your doctors  to solve any problems related to your medications and to help you get the   best results from taking them.      The Medication Therapy Management staff will call you to schedule an appointment.                  General Recommendations To Control Heart Failure When You Get Home     Instructions To Patients and Families: Please read and check off each of these important instructions as you do them when you get home.           Weight and symptoms      ___ Put a scale in your bathroom  ___ Post a weight chart or calendar next to the scale  ___Weigh yourself every day as soon as you you get up in the morning. You should only be wearing your pajamas. Write your weight on the chart/calendar.  ___ Bring your weight chart/calendar with you to all appointments    ___Call your doctor if you gain 2 pounds in 1 day or 5 pounds in 1 week from your \"dry\" weight (baseline weight). Also call your doctor if you have shortness of breath that gets worse over time, leg swelling or fatigue.         Medicines and diet     ___ Make sure to take your medicines as prescribed.    ___Bring a current list of your medicines and all of your medicine bottles with you to all appointments.    ___ Limit fluids if you still have swelling or shortness of breath, or if your doctor tells you to do so.  ___ Eat less than 2000 mg of sodium (salt) every day. Read food labels, and do not add " salt to meals.   ___ Heart healthy diet with low fat and low cholesterol          Activity and suggested lifestyle changes    ___ Stay active. Talk to your doctor about an exercise program that is safe for your heart.    ___ Stop smoking. Reduce alcohol use.      ___ Lose weight if you are overweight. Extra weight puts a lot of stress on the heart.          Control for Leg Swelling   ___ Keep your legs elevated (raised) as needed for swelling. If swelling is uncomfortable or elevation doesn t help, ask your doctor about using ACE wrap or Jobst stockings.          Follow-up appointments   ___ Make a [facility] appointment with a basic metabolic panel lab draw 3 to 5 days after you leave the hospital. Call one of the following locations:   [facility]  [phone],  [facility] [phone],  [facility]  [phone].     ___ Make sure to take your medications as prescribed and bring an accurate list of your medications and your weight chart/calendar to your follow up appointment at the [facility] Clinic for continued education and adjustments          What is the CORE clinic?    The C.O.R.E (Cardiomyopathy, Optimization, Rehabilitation, Education) Clinic is a heart failure specialty clinic within the [facility]. At [facility].O.R[facility] you will work with nurse practitioners to carefully adjust medicines, get education and learn who and when to call if symptoms appear. [facility] nurses specialize in helping you:    better understand your disease.    slow the progress of your disease.    improve the length and quality of your life.    detect future heart problems before they become life threatening.    avoid hospital stays.            Pending Results     Date and Time Order Name Status Description    5/24/2017 1224 US Carotid Bilateral Preliminary     5/24/2017 0739 Blood culture Preliminary      "2017 US Lower Extremity Arterial Duplex Bilateral Preliminary     2017 US DEONTE Doppler No Exercise Preliminary             Statement of Approval     Ordered          17 8058  I have reviewed and agree with all the recommendations and orders detailed in this document.  EFFECTIVE NOW     Approved and electronically signed by:  Rose Conte MD             Admission Information     Date & Time Provider Department Dept. Phone    2017 Rose Conte MD [address] [facility] [phone]      Your Vitals Were     Blood Pressure Pulse Temperature Respirations Height Weight    94/62 (BP Location: Left arm) 69 98.7  F (37.1  C) (Oral) 18 1.727 m (5' 8\") 117.3 kg (258 lb 8 oz)    Pulse Oximetry BMI (Body Mass Index)                94% 39.3 kg/m2          MyChart Information     [facility] lets you send messages to your doctor, view your test results, renew your prescriptions, schedule appointments and more. To sign up, go to [email or web address] . Click on \"Log in\" on the left side of the screen, which will take you to the Welcome page. Then click on \"Sign up Now\" on the right side of the page.     You will be asked to enter the access code listed below, as well as some personal information. Please follow the directions to create your username and password.     Your access code is: [email or web address]  Expires: 2017  6:30 AM     Your access code will  in 90 days. If you need help or a new code, please call your [facility] clinic or [phone].        Care EveryWhere ID     This is your Care EveryWhere ID. This could be used by other organizations to access your [facility] medical records  [id number]           Review of your medicines      START taking        Dose / Directions    diclofenac 1 % Gel topical gel   Commonly known as:  VOLTAREN   Used for:  Left elbow pain        Dose:  2 g   Apply 2 g topically 4 times daily as needed for moderate pain   Quantity:  100 g   Refills: "  0         CONTINUE these medicines which may have CHANGED, or have new prescriptions. If we are uncertain of the size of tablets/capsules you have at home, strength may be listed as something that might have changed.        Dose / Directions    * bumetanide 2 MG tablet   Commonly known as:  BUMEX   This may have changed:  when to take this   Used for:  NICM (nonischemic cardiomyopathy) (H)        Dose:  2 mg   Take 1 tablet (2 mg) by mouth 2 times daily   Quantity:  60 tablet   Refills:  0       * bumetanide 2 MG tablet   Commonly known as:  BUMEX   This may have changed:  You were already taking a medication with the same name, and this prescription was added. Make sure you understand how and when to take each.   Used for:  NICM (nonischemic cardiomyopathy) (H)        Dose:  2 mg   Take 1 tablet (2 mg) by mouth 2 times daily   Quantity:  60 tablet   Refills:  0       * Notice:  This list has 2 medication(s) that are the same as other medications prescribed for you. Read the directions carefully, and ask your doctor or other care provider to review them with you.      CONTINUE these medicines which have NOT CHANGED        Dose / Directions    albuterol 108 (90 BASE) MCG/ACT Inhaler   Generic drug:  albuterol        Dose:  2 puff   Inhale 2 puffs into the lungs every 4 hours as needed for shortness of breath / dyspnea or wheezing   Refills:  0       amiodarone 200 MG tablet   Commonly known as:  PACERONE/CODARONE        Dose:  100 mg   Take 100 mg by mouth daily   Refills:  0       BENADRYL 25 MG capsule   Generic drug:  diphenhydrAMINE        Dose:  25 mg   Take 25 mg by mouth nightly as needed for itching or allergies   Refills:  0       BREO ELLIPTA 200-25 MCG/INH oral inhaler   Generic drug:  fluticasone-vilanterol        Dose:  1 puff   Inhale 1 puff into the lungs daily   Refills:  0       celeXA 20 MG tablet   Generic drug:  citalopram        Dose:  20 mg   Take 20 mg by mouth daily   Refills:  0        cyanocobalamin 1000 MCG/ML injection   Commonly known as:  VITAMIN B12        Dose:  1000 mcg   Inject 1,000 mcg into the muscle every 30 days   Refills:  0       DIGOX 125 MCG tablet   Generic drug:  digoxin        Dose:  125 mcg   Take 125 mcg by mouth daily   Refills:  0       GLUCOPHAGE 1000 MG tablet   Generic drug:  metFORMIN        Dose:  1000 mg   Take 1,000 mg by mouth daily (with dinner)   Refills:  0       hydrALAZINE 10 MG tablet   Commonly known as:  APRESOLINE   Used for:  Chronic systolic heart failure (H)        Dose:  10 mg   Take 1 tablet (10 mg) by mouth 3 times daily   Quantity:  120 tablet   Refills:  1       INCRUSE ELLIPTA 62.5 MCG/INH oral inhaler   Generic drug:  umeclidinium        Dose:  1 puff   Inhale 1 puff into the lungs daily   Refills:  0       ipratropium - albuterol 0.5 mg/2.5 mg/3 mL 0.5-2.5 (3) MG/3ML neb solution   Commonly known as:  DUONEB        Dose:  3 mL   Take 3 mLs by nebulization every 4 hours as needed for shortness of breath / dyspnea or wheezing   Refills:  0       losartan 50 MG tablet   Commonly known as:  COZAAR   Used for:  Chronic systolic heart failure (H)        Dose:  25 mg   Take 0.5 tablets (25 mg) by mouth 2 times daily   Quantity:  30 tablet   Refills:  1       metoprolol 100 MG 24 hr tablet   Commonly known as:  TOPROL XL   Indication:  2 tabs in AM, 1 tab in evening   Used for:  Chronic systolic heart failure (H)        Dose:  100 mg   Take 1 tablet (100 mg) by mouth 2 times daily   Quantity:  60 tablet   Refills:  5       SINGULAIR 10 MG tablet   Generic drug:  montelukast        Dose:  10 mg   Take 10 mg by mouth At Bedtime   Refills:  0       spironolactone 25 MG tablet   Commonly known as:  ALDACTONE        Dose:  25 mg   Take 25 mg by mouth 2 times daily   Refills:  0       TESSALON PERLES 100 MG capsule   Generic drug:  benzonatate        Dose:  200 mg   Take 200 mg by mouth 3 times daily as needed for cough   Refills:  0       traMADol 50 MG  tablet   Commonly known as:  ULTRAM        Dose:  100 mg   Take 100 mg by mouth every 6 hours as needed for moderate pain   Refills:  0       warfarin 5 MG tablet   Commonly known as:  COUMADIN        Dose:  5 mg   Take 5 mg by mouth See Admin Instructions 10 mg M,W,F, 7.5 mg all other days   Refills:  0       zinc sulfate 220 (50 ZN) MG capsule   Commonly known as:  ZINCATE        Dose:  220 mg   Take 220 mg by mouth 2 times daily   Refills:  0            Where to get your medicines      These medications were sent to [facility] - [place], [place] - [address] [facility] [address] [facility]  [address] [facility] [address]     Phone:  [phone]     bumetanide 2 MG tablet         These medications were sent to [facility] [address] [place] [address] [place], [place] - [address] [place] AT [facility] [address]  [address] [place] [address]     Phone:  [phone]     bumetanide 2 MG tablet    diclofenac 1 % Gel topical gel                Protect others around you: Learn how to safely use, store and throw away your medicines at www.disposemymeds.org.             Medication List: This is a list of all your medications and when to take them. Check marks below indicate your daily home schedule. Keep this list as a reference.      Medications           Morning Afternoon Evening Bedtime As Needed    albuterol 108 (90 BASE) MCG/ACT Inhaler   Inhale 2 puffs into the lungs every 4 hours as needed for shortness of breath / dyspnea or wheezing   Generic drug:  albuterol                                   amiodarone 200 MG tablet   Commonly known as:  PACERONE/CODARONE   Take 100 mg by mouth daily   Last time this was given:  100 mg on 5/26/2017  8:12 AM                                   BENADRYL 25 MG capsule   Take 25 mg by mouth nightly as needed for itching or allergies   Generic drug:  diphenhydrAMINE                                   BREO ELLIPTA 200-25 MCG/INH oral inhaler   Inhale 1 puff into the lungs  daily   Last time this was given:  1 puff on 5/26/2017  8:12 AM   Generic drug:  fluticasone-vilanterol                                   * bumetanide 2 MG tablet   Commonly known as:  BUMEX   Take 1 tablet (2 mg) by mouth 2 times daily   Last time this was given:  2 mg on 5/26/2017  8:12 AM                                      * bumetanide 2 MG tablet   Commonly known as:  BUMEX   Take 1 tablet (2 mg) by mouth 2 times daily   Last time this was given:  2 mg on 5/26/2017  8:12 AM                                celeXA 20 MG tablet   Take 20 mg by mouth daily   Last time this was given:  20 mg on 5/26/2017  8:11 AM   Generic drug:  citalopram                                   cyanocobalamin 1000 MCG/ML injection   Commonly known as:  VITAMIN B12   Inject 1,000 mcg into the muscle every 30 days                                diclofenac 1 % Gel topical gel   Commonly known as:  VOLTAREN   Apply 2 g topically 4 times daily as needed for moderate pain   Last time this was given:  2 g on 5/26/2017  1:28 PM                                   DIGOX 125 MCG tablet   Take 125 mcg by mouth daily   Last time this was given:  125 mcg on 5/26/2017  8:12 AM   Generic drug:  digoxin                                   GLUCOPHAGE 1000 MG tablet   Take 1,000 mg by mouth daily (with dinner)   Generic drug:  metFORMIN                                hydrALAZINE 10 MG tablet   Commonly known as:  APRESOLINE   Take 1 tablet (10 mg) by mouth 3 times daily   Last time this was given:  10 mg on 5/26/2017  1:37 PM                                         INCRUSE ELLIPTA 62.5 MCG/INH oral inhaler   Inhale 1 puff into the lungs daily   Last time this was given:  1 puff on 5/26/2017  8:11 AM   Generic drug:  umeclidinium                                   ipratropium - albuterol 0.5 mg/2.5 mg/3 mL 0.5-2.5 (3) MG/3ML neb solution   Commonly known as:  DUONEB   Take 3 mLs by nebulization every 4 hours as needed for shortness of breath / dyspnea or  wheezing   Last time this was given:  3 mLs on 5/26/2017  5:59 AM                                   losartan 50 MG tablet   Commonly known as:  COZAAR   Take 0.5 tablets (25 mg) by mouth 2 times daily   Last time this was given:  25 mg on 5/26/2017  8:12 AM                                      metoprolol 100 MG 24 hr tablet   Commonly known as:  TOPROL XL   Take 1 tablet (100 mg) by mouth 2 times daily   Last time this was given:  100 mg on 5/26/2017  8:11 AM                                      SINGULAIR 10 MG tablet   Take 10 mg by mouth At Bedtime   Last time this was given:  10 mg on 5/25/2017  9:02 PM   Generic drug:  montelukast                                   spironolactone 25 MG tablet   Commonly known as:  ALDACTONE   Take 25 mg by mouth 2 times daily   Last time this was given:  25 mg on 5/26/2017  8:12 AM                                      TESSALON PERLES 100 MG capsule   Take 200 mg by mouth 3 times daily as needed for cough   Generic drug:  benzonatate                                   traMADol 50 MG tablet   Commonly known as:  ULTRAM   Take 100 mg by mouth every 6 hours as needed for moderate pain   Last time this was given:  50 mg on 5/26/2017  8:10 AM             You can take this again any time after 2 PM                      warfarin 5 MG tablet   Commonly known as:  COUMADIN   Take 5 mg by mouth See Admin Instructions 10 mg M,W,F, 7.5 mg all other days   Last time this was given:  10 mg on 5/25/2017  6:09 PM                    We have been giving you this medication around 6 PM               zinc sulfate 220 (50 ZN) MG capsule   Commonly known as:  ZINCATE   Take 220 mg by mouth 2 times daily                                * Notice:  This list has 2 medication(s) that are the same as other medications prescribed for you. Read the directions carefully, and ask your doctor or other care provider to review them with you.

## 2017-05-23 NOTE — IP AVS SNAPSHOT
[address] [address]    Phone:  [phone]                                       After Visit Summary   5/23/2017    Jim Willingham    MRN: [id number]           After Visit Summary Signature Page     I have received my discharge instructions, and my questions have been answered. I have discussed any challenges I see with this plan with the nurse or doctor.    ..........................................................................................................................................  Patient/Patient Representative Signature      ..........................................................................................................................................  Patient Representative Print Name and Relationship to Patient    ..................................................               ................................................  Date                                            Time    ..........................................................................................................................................  Reviewed by Signature/Title    ...................................................              ..............................................  Date                                                            Time

## 2017-05-23 NOTE — H&P
CARDS 2 Admission History and Physical  Jim Willingham MRN: [id number]  1955  Date of Admission:5/23/2017  Primary care provider: Chase Molina  ___________________________________          Assessment and Plan:   Jim Willingham is a 61 year old male with PMH of NICM (EF 20%) s/p Bi-V ICD, severe MR, VT, paroxsymal afib, DM, HTN, CKD, CECILIA, Depression admitted for initiation of LVAD workup.    NICM (EF20%)  Combined Systolic Diastolic Heart Failure  Longstanding heart failure, recent referral to [facility] for consideration of advanced therapies. Clinic visit with Dr. Montgomery 5/12 with plan for admission for medical optimization and LVAD workup, with plan for consideration of heart transplant in future. RHC prior to admission, appears to be doing well on current medical regimen.  - Continue Metoprolol- mg BID, Bumex 2mg daily, spironolactone 25 mg daily, losartan 25 mg daily, hydralazine 10 mg daily  - LVAD orderset placed, has completed CXR and Head CT without acute pathology    Asthma/COPD  Recently completed extended steroid taper (~ 1 year). S/p doxycycline course for possible exacerbation. Does endorse productive cough. No opacities on CXR.  - Continue Breo ellipta, umeclidinium, singulair, PRN albuterol  - Sputum culture    Possible Gout  Acute onset periodic pain of joints (thumb, toe, now left knee) accompanied by significant swelling and decreased mobility that self-resolves after several days. Possibly gout, never worked up before. Has spent much of past year on steroids as above.   - Check CRP, ESR, Uric acid to have baseline  - If knee pain worsens could consider xray    Chronic Conditions  VT: Continue amiodarone 100 mg daily and digoxin 125 mcg daily, will check TSH and dig level in am  Paroxsymal Afib: On amiodarone, digoxin as above, Pharmacy consult for warfarin  CKD: Baseline creatinine per chart review ~ 1.5, currently 1.52  DM: Hold metformin,  low sliding scale insulin with hypoglycemia protocol  CECILIA: CPAP on home settings  Depression: Continue home citalopram 20 mg daily    FEN: 2g sodium  Prophylaxis: On warfarin  Consults: As per LVAD workup  Code Status: FULL  Disposition: Admission to [place] [address] for initiation of LVAD workup    Patient discussed with Cardiology fellow, to be formally staffed in the am    Shanon Clemente MD PhD  Internal Medicine PGY-2  [phone]       I have reviewed today's vital signs, notes, medications, labs and imaging. I have also seen and examined the patient and agree with the findings and plan as outlined above.  Please see progress note from 5/24/17 for updated findings and plan.    Rose Conte MD  Section Head - Advanced Heart Failure, Transplantation and Mechanical Circulatory Support  Co-Director - [facility]  Associate Professor of Medicine, [facility]           Chief Complaint:   End Stage Heart Failure         History of Present Illness:   Jim Willingham is a 61 year old male with PMH of NICM (EF 20%) s/p ICD, severe MR, VT, paroxsymal afib, DM, HTN, CKD, CECILIA, Depression admitted for initiation of LVAD workup.    Patient reports admission to [facility] in April with medication adjustment, diuresis, and since then with decrease in dyspnea though still always SOB. Can lie flat and uses his CPAP every night. Feels tired all the time, no strength, continues to have lightheaded/dizzy feeling. Some edema in the legs but feels that fluid accumulates in abdomen more and has had early satiety. Checks weight daily, thinks dry weight ~ 257 lbs and recently has been 257-260 but now is 265lbs. Denies chest pain, palpitations.     Just finished course of doxycycline for COPD exacerbation consisting of increased cough with yellow-green sputum. Starting to have yellow-green sputum again. Just finished prolonged steroid course/taper (> 1 year) for his COPD. Previously  unable to tolerate being off steroids. Thinks likely has undiagnosed gout, will intermittently get acute onset joint pain and swelling. Has had in thumb prior, one year ago in toe, yesterday in left knee. Today swelling, pain, mobility all improved.    Denies any recent diet changes, no new OTC supplements, no recreational drug use. Lives with wife, son, great-grandaughter. Retired respiratory therapist.           Past Cardiac History:   Recent Cardiac Admissions:   Previously followed by outpatient Cardiology in [facility] system and referred to [facility] for consideration of advanced therapies. Reported admission to [facility] in April for heart failure with diuresis, decrease of metoprolol and losartan and addition of hydralazine.    Last ECHO:  5/1/2017 at [facility]   1. Severely increased left ventricular size. LVEDd is 7.66 cm   2. LV function is severely reduced. The visually estimated ejection fraction is 20%.   3. Septal motion consistent with conduction delay.   4. Restrictive pattern of diastolic filling consistent with severe diastolic dysfunction.   5. Severely dilated left atrium.   6. Severe mitral valve regurgitation.   7. Moderate pulmonary hypertension. PAP 48 mmHG plus RAP, or approximately 55-58 mmHG.   8. Compared to prior study (1/26/2016); The EF remains severely depressed at 20%. Mitral regurgitation has increased from moderate to severe, likely due to the patient's cardiomyopathy. PAP has increased from 22 mmHG plus RAP to 48 mmHG feliz RAP.   9. The rhythm is normal sinus.    [facility] 5/23/2017:  HEMODYNAMICS:  1. HR 70 bpm  2. /66/77 mmHg  3. RA 10/9/8   4. RV 46/11  5. PA 46/24/33   6. PCW 25/21/18   7. PA sat 59.3%  9. Hgb 10.7 g/dL   10. Kee CO 5.2   11. Kee CI 2.3   12. TD CO 5.6   13. TD CI 2.4  14. PVR 2.9  15. SVR 1061     Last Angiogram: 5/5/2017  Conclusions    No evidence of high grade obstructive CAD. Minimal irregularities noted.    Elevated right and left heart  pressures (please defer to hemodynamics  section for further details).    Reduced cardiac output ~ 4.7 L/min.        Past Medical History:     Past Medical History:   Diagnosis Date     Benign essential hypertension 5/11/2017     Cardiomyopathy, unspecified (H) 5/8/2017     CKD (chronic kidney disease) stage 3, GFR 30-59 ml/min 5/11/2017     Depression 5/11/2017     Diabetes mellitus (H) 5/11/2017     H/O gastric bypass 5/11/2017     ICD (implantable cardioverter-defibrillator), biventricular, in situ 5/11/2017     NICM (nonischemic cardiomyopathy) (H)/ EF 20% 5/11/2017    ECHO: LVEDd. 7.66 cm, Restrictive pattern , Severe mitral valve regurgitation     CECILIA (obstructive sleep apnea) 5/11/2017     Paroxysmal atrial fibrillation (H) 5/11/2017     Paroxysmal VT (H) 5/11/2017     Vitamin B12 deficiency (non anemic) 5/11/2017           Past Surgical History:      Past Surgical History:   Procedure Laterality Date     GI SURGERY  2003    [place] [facility]     ORTHOPEDIC SURGERY  1994    right knee wired           Social History:     Social History   Substance Use Topics     Smoking status: Former Smoker     Quit date: 1995     Smokeless tobacco: Not on file     Alcohol use No    No alcohol since 2010         Family History:   No family history of premature MI, SCD, or CHF.          Allergies:     Allergies   Allergen Reactions     Ace Inhibitors Unknown     Sulfa Drugs Unknown           Medications:     No current facility-administered medications on file prior to encounter.   Current Outpatient Prescriptions on File Prior to Encounter:  losartan (COZAAR) 50 MG tablet Take 0.5 tablets (25 mg) by mouth 2 times daily   hydrALAZINE (APRESOLINE) 10 MG tablet Take 1 tablet (10 mg) by mouth 3 times daily   benzonatate (TESSALON PERLES) 100 MG capsule Take 200 mg by mouth 3 times daily as needed for cough   albuterol (ALBUTEROL) 108 (90 BASE) MCG/ACT Inhaler Inhale 2 puffs into the lungs every 4 hours as needed for shortness of breath /  "dyspnea or wheezing   ipratropium - albuterol 0.5 mg/2.5 mg/3 mL (DUONEB) 0.5-2.5 (3) MG/3ML neb solution Take 3 mLs by nebulization every 4 hours as needed for shortness of breath / dyspnea or wheezing   amiodarone (PACERONE/CODARONE) 200 MG tablet Take 100 mg by mouth daily   bumetanide (BUMEX) 2 MG tablet Take 2 mg by mouth daily   citalopram (CELEXA) 20 MG tablet Take 20 mg by mouth daily   cyanocobalamin (VITAMIN B12) 1000 MCG/ML injection Inject 1,000 mcg into the muscle every 30 days   digoxin (DIGOX) 125 MCG tablet Take 125 mcg by mouth daily   diphenhydrAMINE (BENADRYL) 25 MG capsule Take 25 mg by mouth nightly as needed for itching or allergies   fluticasone-vilanterol (BREO ELLIPTA) 200-25 MCG/INH oral inhaler Inhale 1 puff into the lungs daily   metFORMIN (GLUCOPHAGE) 1000 MG tablet Take 1,000 mg by mouth daily (with dinner)   montelukast (SINGULAIR) 10 MG tablet Take 10 mg by mouth At Bedtime   spironolactone (ALDACTONE) 25 MG tablet Take 25 mg by mouth 2 times daily   traMADol (ULTRAM) 50 MG tablet Take 100 mg by mouth every 6 hours as needed for moderate pain   umeclidinium (INCRUSE ELLIPTA) 62.5 MCG/INH oral inhaler Inhale 1 puff into the lungs daily   warfarin (COUMADIN) 5 MG tablet Take 5 mg by mouth See Admin Instructions 10 mg M,W,F, 7.5 mg all other days   zinc sulfate (ZINCATE) 220 (50 ZN) MG capsule Take 220 mg by mouth 2 times daily          Physical Exam:   Blood pressure 109/71, pulse 74, temperature 95.5  F (35.3  C), temperature source Oral, resp. rate 20, height 1.727 m (5' 8\"), weight 119.9 kg (264 lb 4.8 oz), SpO2 94 %.    General: NAD, pleasant, sitting up in bed  HEENT: NCAT, PERRL, EOMI, OP clear and non-erythematous  Neck: No LAD, JVD difficult to discern  CV: Regular rate and rhythm, no mumur  Resp: Air movement bilaterally, mildly muffled, no rales, rhonchi, wheezes  Abd: Obese, soft, non-distended, non-tender  Extremities: WWP, 1+ LE edema, good DP pulses  Skin: No lesions or " rashes appreciated  Neuro/Psych: AAOx4, CN 2-12 grossly intact, moving all four extremities         Data:      CMP  Recent Labs  Lab 05/23/17  1157      POTASSIUM 4.2   CHLORIDE 101   CO2 27   ANIONGAP 8   *   BUN 46*   CR 1.52*   GFRESTIMATED 47*   QAMAR 8.5   PROTTOTAL 6.6*   ALBUMIN 3.5   BILITOTAL 1.1   ALKPHOS 72   AST 26   ALT 44     CBC  Recent Labs  Lab 05/23/17  1157   WBC 7.5   RBC 3.79*   HGB 11.3*   HCT 36.2*   MCV 96   MCH 29.8   MCHC 31.2*   RDW 14.0        INR  Recent Labs  Lab 05/23/17  1157   INR 1.36*     NT-pro BNP 1832    IMAGING:   CXR:   FINDINGS: Left chest wall implantable cardiac defibrillator and  pacemaker generator and leads are intact. Cardiac silhouette is  enlarged. Perihilar opacities present. No pleural effusions. No  pneumothorax. No acute bony abnormalities.  IMPRESSION: Mild perihilar opacities, which may represent pulmonary  edema versus atelectasis.

## 2017-05-23 NOTE — PROGRESS NOTES
Arrived from home for a C with swan placement and admission to .  VSS.  C/O new pain to right knee with swelling.  Consent obtained.  Labs drawn earlier today.  H&P current.  Ready for procedure.

## 2017-05-23 NOTE — PROCEDURES
PRELIMINARY CARDIAC CATH REPORT:     PROCEDURES PERFORMED:   Right Heart Catheterization    PHYSICIANS:  Attending Physician: Dr. Michele MD  Interventional Cardiology Fellow: None  Cardiology Fellow: German Gómez MD    INDICATION:  Jim Willingham is a 61 year old male with NICM s/p CRT-D and multiple other co-morbidities is here for RHC for advanced heart failure management.    DESCRIPTION:  1. Consent obtained with discussion of risks.  All questions were answered.  2. Sterile prep and procedure.  3. Location with Sheaths:   Rt IJ  7 Fr 10 cm [short]  4. Access: Local anesthetic with lidocaine.  A micropuncture 21 guage needle with ultrasound guidance was used to establish vascular access using a modified Seldinger technique.  5. Diagnostic Catheters:   4 Fr  [place]  6. Estimated blood loss: < 5 ml    MEDICATIONS:  The procedure was performed under local anesthesia.  Heart rate, BP, respiration, oxygen saturation and patient responses were monitored throughout the procedure with the assistance of the RN under my supervision.    Procedures:    HEMODYNAMICS:  1. HR 70 bpm  2. /66/77 mmHg  3. RA 10/9/8   4. RV 46/11  5. PA 46/24/33   6. PCW 25/21/18   7. PA sat 59.3%  9. Hgb 10.7 g/dL   10. Kee CO 5.2   11. Kee CI 2.3   12. TD CO 5.6   13. TD CI 2.4  14. PVR 2.9  15. SVR 1061     Sheath Removal:  The 7 Fr sheath was manually removed in the cardiac catheterization laboratory.    Contrast: Isovue, 0 ml     Fluoroscopy Time: 2.3 min    COMPLICATIONS:  1. None    SUMMARY:   >> Mildly elevated right sided filling pressures.  >> Moderately elevated left sided filling pressures.  >> Mild secondary pulmonary hypertension  >> Normal cardiac output, 5.2 L/min with index 2.3 L/min/m2    PLAN: Admit to [facility] service for medical optimization    The attending cardiologist was present and supervised all critical aspects the procedure.    Findings discussed with Dr. Montgomery.    See CVIS report for final  draft.    German Gómez MD  Cardiology Fellow    [place] MD Michele  Cardiology Staff    I was present for the entirety of the procedure and supervised the cardiology fellow.     DIANNE Bautista MD

## 2017-05-24 ENCOUNTER — DOCUMENTATION ONLY (OUTPATIENT)
Dept: CARDIOLOGY | Facility: CLINIC | Age: 62
End: 2017-05-24

## 2017-05-24 ENCOUNTER — APPOINTMENT (OUTPATIENT)
Dept: ULTRASOUND IMAGING | Facility: CLINIC | Age: 62
DRG: 287 | End: 2017-05-24
Attending: INTERNAL MEDICINE
Payer: COMMERCIAL

## 2017-05-24 ENCOUNTER — RESULTS ONLY (OUTPATIENT)
Dept: OTHER | Facility: CLINIC | Age: 62
End: 2017-05-24

## 2017-05-24 ENCOUNTER — APPOINTMENT (OUTPATIENT)
Dept: CT IMAGING | Facility: CLINIC | Age: 62
DRG: 287 | End: 2017-05-24
Attending: INTERNAL MEDICINE
Payer: COMMERCIAL

## 2017-05-24 ENCOUNTER — ALLIED HEALTH/NURSE VISIT (OUTPATIENT)
Dept: CARDIOLOGY | Facility: CLINIC | Age: 62
DRG: 287 | End: 2017-05-24
Attending: INTERNAL MEDICINE
Payer: COMMERCIAL

## 2017-05-24 DIAGNOSIS — I42.8 NICM (NONISCHEMIC CARDIOMYOPATHY) (H): Primary | ICD-10-CM

## 2017-05-24 LAB
ABO + RH BLD: NORMAL
ABO + RH BLD: NORMAL
ANION GAP SERPL CALCULATED.3IONS-SCNC: 9 MMOL/L (ref 3–14)
APTT PPP: 32 SEC (ref 22–37)
BLD GP AB SCN SERPL QL: NORMAL
BLOOD BANK CMNT PATIENT-IMP: NORMAL
BUN SERPL-MCNC: 45 MG/DL (ref 7–30)
CALCIUM SERPL-MCNC: 8.4 MG/DL (ref 8.5–10.1)
CARDIOLIPIN ANTIBODY IGG: NORMAL GPL-U/ML (ref 0–19.9)
CARDIOLIPIN ANTIBODY IGM: 1.2 MPL-U/ML (ref 0–19.9)
CHLORIDE SERPL-SCNC: 103 MMOL/L (ref 94–109)
CO2 SERPL-SCNC: 26 MMOL/L (ref 20–32)
CREAT SERPL-MCNC: 1.41 MG/DL (ref 0.66–1.25)
DIGOXIN SERPL-MCNC: 0.8 UG/L (ref 0.5–2)
ERYTHROCYTE [DISTWIDTH] IN BLOOD BY AUTOMATED COUNT: 14 % (ref 10–15)
ERYTHROCYTE [SEDIMENTATION RATE] IN BLOOD BY WESTERGREN METHOD: 17 MM/H (ref 0–20)
FERRITIN SERPL-MCNC: 200 NG/ML (ref 26–388)
GFR SERPL CREATININE-BSD FRML MDRD: 51 ML/MIN/1.7M2
GLUCOSE BLDC GLUCOMTR-MCNC: 110 MG/DL (ref 70–99)
GLUCOSE BLDC GLUCOMTR-MCNC: 118 MG/DL (ref 70–99)
GLUCOSE BLDC GLUCOMTR-MCNC: 124 MG/DL (ref 70–99)
GLUCOSE BLDC GLUCOMTR-MCNC: 134 MG/DL (ref 70–99)
GLUCOSE BLDC GLUCOMTR-MCNC: 136 MG/DL (ref 70–99)
GLUCOSE SERPL-MCNC: 111 MG/DL (ref 70–99)
HBA1C MFR BLD: 6.7 % (ref 4.3–6)
HCT VFR BLD AUTO: 33.5 % (ref 40–53)
HEMOCCULT STL QL IA: NEGATIVE
HGB BLD-MCNC: 10.7 G/DL (ref 13.3–17.7)
HLA TYPING COMPLETE SOT RECIPIENT: NORMAL
INR PPP: 1.28 (ref 0.86–1.14)
INTERPRETATION ECG - MUSE: NORMAL
IRON SATN MFR SERPL: 25 % (ref 15–46)
IRON SERPL-MCNC: 63 UG/DL (ref 35–180)
LDH SERPL L TO P-CCNC: 261 U/L (ref 85–227)
MAGNESIUM SERPL-MCNC: 2.1 MG/DL (ref 1.6–2.3)
MCH RBC QN AUTO: 30.1 PG (ref 26.5–33)
MCHC RBC AUTO-ENTMCNC: 31.9 G/DL (ref 31.5–36.5)
MCV RBC AUTO: 94 FL (ref 78–100)
PHOSPHATE SERPL-MCNC: 2.7 MG/DL (ref 2.5–4.5)
PLATELET # BLD AUTO: 142 10E9/L (ref 150–450)
POTASSIUM SERPL-SCNC: 4 MMOL/L (ref 3.4–5.3)
PRA SINGLE ANTIGEN IGG ANTIBODY: NORMAL
PROCALCITONIN SERPL-MCNC: 0.07 NG/ML
PROCALCITONIN SERPL-MCNC: 0.07 NG/ML
RADIOLOGIST FLAGS: NORMAL
RBC # BLD AUTO: 3.56 10E12/L (ref 4.4–5.9)
SODIUM SERPL-SCNC: 138 MMOL/L (ref 133–144)
SPECIMEN EXP DATE BLD: NORMAL
TIBC SERPL-MCNC: 248 UG/DL (ref 240–430)
TSH SERPL DL<=0.005 MIU/L-ACNC: 1.65 MU/L (ref 0.4–4)
URATE SERPL-MCNC: 8.9 MG/DL (ref 3.5–7.2)
VIT B12 SERPL-MCNC: 480 PG/ML (ref 193–986)
WBC # BLD AUTO: 5 10E9/L (ref 4–11)

## 2017-05-24 PROCEDURE — 86147 CARDIOLIPIN ANTIBODY EA IG: CPT | Performed by: INTERNAL MEDICINE

## 2017-05-24 PROCEDURE — 83036 HEMOGLOBIN GLYCOSYLATED A1C: CPT | Performed by: INTERNAL MEDICINE

## 2017-05-24 PROCEDURE — 84443 ASSAY THYROID STIM HORMONE: CPT | Performed by: INTERNAL MEDICINE

## 2017-05-24 PROCEDURE — 86901 BLOOD TYPING SEROLOGIC RH(D): CPT | Performed by: INTERNAL MEDICINE

## 2017-05-24 PROCEDURE — 94640 AIRWAY INHALATION TREATMENT: CPT

## 2017-05-24 PROCEDURE — 86900 BLOOD TYPING SEROLOGIC ABO: CPT | Performed by: INTERNAL MEDICINE

## 2017-05-24 PROCEDURE — 86833 HLA CLASS II HIGH DEFIN QUAL: CPT | Performed by: INTERNAL MEDICINE

## 2017-05-24 PROCEDURE — 85027 COMPLETE CBC AUTOMATED: CPT | Performed by: INTERNAL MEDICINE

## 2017-05-24 PROCEDURE — 82728 ASSAY OF FERRITIN: CPT | Performed by: INTERNAL MEDICINE

## 2017-05-24 PROCEDURE — 82274 ASSAY TEST FOR BLOOD FECAL: CPT | Performed by: INTERNAL MEDICINE

## 2017-05-24 PROCEDURE — 99232 SBSQ HOSP IP/OBS MODERATE 35: CPT | Mod: 25 | Performed by: INTERNAL MEDICINE

## 2017-05-24 PROCEDURE — 25000132 ZZH RX MED GY IP 250 OP 250 PS 637: Performed by: INTERNAL MEDICINE

## 2017-05-24 PROCEDURE — 83735 ASSAY OF MAGNESIUM: CPT | Performed by: INTERNAL MEDICINE

## 2017-05-24 PROCEDURE — 82607 VITAMIN B-12: CPT | Performed by: INTERNAL MEDICINE

## 2017-05-24 PROCEDURE — 36415 COLL VENOUS BLD VENIPUNCTURE: CPT | Performed by: INTERNAL MEDICINE

## 2017-05-24 PROCEDURE — 76700 US EXAM ABDOM COMPLETE: CPT

## 2017-05-24 PROCEDURE — 40000275 ZZH STATISTIC RCP TIME EA 10 MIN

## 2017-05-24 PROCEDURE — 86850 RBC ANTIBODY SCREEN: CPT | Performed by: INTERNAL MEDICINE

## 2017-05-24 PROCEDURE — 71250 CT THORAX DX C-: CPT

## 2017-05-24 PROCEDURE — 83615 LACTATE (LD) (LDH) ENZYME: CPT | Performed by: INTERNAL MEDICINE

## 2017-05-24 PROCEDURE — 00000401 ZZHCL STATISTIC THROMBIN TIME NC: Performed by: INTERNAL MEDICINE

## 2017-05-24 PROCEDURE — 86832 HLA CLASS I HIGH DEFIN QUAL: CPT | Performed by: INTERNAL MEDICINE

## 2017-05-24 PROCEDURE — 00000146 ZZHCL STATISTIC GLUCOSE BY METER IP

## 2017-05-24 PROCEDURE — 84145 PROCALCITONIN (PCT): CPT | Performed by: INTERNAL MEDICINE

## 2017-05-24 PROCEDURE — 85652 RBC SED RATE AUTOMATED: CPT | Performed by: INTERNAL MEDICINE

## 2017-05-24 PROCEDURE — 81370 HLA I & II TYPING LR: CPT | Performed by: INTERNAL MEDICINE

## 2017-05-24 PROCEDURE — 94640 AIRWAY INHALATION TREATMENT: CPT | Mod: 76

## 2017-05-24 PROCEDURE — 84466 ASSAY OF TRANSFERRIN: CPT | Performed by: INTERNAL MEDICINE

## 2017-05-24 PROCEDURE — 85730 THROMBOPLASTIN TIME PARTIAL: CPT | Performed by: INTERNAL MEDICINE

## 2017-05-24 PROCEDURE — 82610 CYSTATIN C: CPT | Performed by: INTERNAL MEDICINE

## 2017-05-24 PROCEDURE — 80048 BASIC METABOLIC PNL TOTAL CA: CPT | Performed by: INTERNAL MEDICINE

## 2017-05-24 PROCEDURE — 83540 ASSAY OF IRON: CPT | Performed by: INTERNAL MEDICINE

## 2017-05-24 PROCEDURE — 93296 REM INTERROG EVL PM/IDS: CPT | Mod: ZF

## 2017-05-24 PROCEDURE — 93880 EXTRACRANIAL BILAT STUDY: CPT

## 2017-05-24 PROCEDURE — 85610 PROTHROMBIN TIME: CPT | Performed by: INTERNAL MEDICINE

## 2017-05-24 PROCEDURE — 81376 HLA II TYPING 1 LOCUS LR: CPT | Performed by: INTERNAL MEDICINE

## 2017-05-24 PROCEDURE — 21400006 ZZH R&B CCU INTERMEDIATE UMMC

## 2017-05-24 PROCEDURE — 87040 BLOOD CULTURE FOR BACTERIA: CPT | Performed by: INTERNAL MEDICINE

## 2017-05-24 PROCEDURE — 25000132 ZZH RX MED GY IP 250 OP 250 PS 637: Performed by: STUDENT IN AN ORGANIZED HEALTH CARE EDUCATION/TRAINING PROGRAM

## 2017-05-24 PROCEDURE — 85613 RUSSELL VIPER VENOM DILUTED: CPT | Performed by: INTERNAL MEDICINE

## 2017-05-24 PROCEDURE — 93925 LOWER EXTREMITY STUDY: CPT

## 2017-05-24 PROCEDURE — 83550 IRON BINDING TEST: CPT | Performed by: INTERNAL MEDICINE

## 2017-05-24 PROCEDURE — 84100 ASSAY OF PHOSPHORUS: CPT | Performed by: INTERNAL MEDICINE

## 2017-05-24 PROCEDURE — 80162 ASSAY OF DIGOXIN TOTAL: CPT | Performed by: INTERNAL MEDICINE

## 2017-05-24 PROCEDURE — 84550 ASSAY OF BLOOD/URIC ACID: CPT | Performed by: INTERNAL MEDICINE

## 2017-05-24 PROCEDURE — 25000125 ZZHC RX 250: Performed by: INTERNAL MEDICINE

## 2017-05-24 PROCEDURE — 93295 DEV INTERROG REMOTE 1/2/MLT: CPT | Performed by: INTERNAL MEDICINE

## 2017-05-24 RX ORDER — WARFARIN SODIUM 10 MG/1
10 TABLET ORAL
Status: COMPLETED | OUTPATIENT
Start: 2017-05-24 | End: 2017-05-24

## 2017-05-24 RX ORDER — LIDOCAINE 50 MG/G
1 PATCH TOPICAL
Status: DISCONTINUED | OUTPATIENT
Start: 2017-05-24 | End: 2017-05-26 | Stop reason: HOSPADM

## 2017-05-24 RX ORDER — BUMETANIDE 2 MG/1
2 TABLET ORAL 2 TIMES DAILY
Status: DISCONTINUED | OUTPATIENT
Start: 2017-05-24 | End: 2017-05-26 | Stop reason: HOSPADM

## 2017-05-24 RX ADMIN — METOPROLOL SUCCINATE 100 MG: 100 TABLET, EXTENDED RELEASE ORAL at 16:53

## 2017-05-24 RX ADMIN — HYDRALAZINE HYDROCHLORIDE 10 MG: 10 TABLET, FILM COATED ORAL at 08:15

## 2017-05-24 RX ADMIN — SPIRONOLACTONE 25 MG: 25 TABLET ORAL at 08:15

## 2017-05-24 RX ADMIN — MONTELUKAST SODIUM 10 MG: 10 TABLET, FILM COATED ORAL at 21:50

## 2017-05-24 RX ADMIN — FLUTICASONE FUROATE AND VILANTEROL TRIFENATATE 1 PUFF: 200; 25 POWDER RESPIRATORY (INHALATION) at 08:12

## 2017-05-24 RX ADMIN — DIGOXIN 125 MCG: 125 TABLET ORAL at 08:15

## 2017-05-24 RX ADMIN — LOSARTAN POTASSIUM 25 MG: 25 TABLET, FILM COATED ORAL at 08:15

## 2017-05-24 RX ADMIN — BUMETANIDE 2 MG: 2 TABLET ORAL at 08:15

## 2017-05-24 RX ADMIN — HYDRALAZINE HYDROCHLORIDE 10 MG: 10 TABLET, FILM COATED ORAL at 20:51

## 2017-05-24 RX ADMIN — IPRATROPIUM BROMIDE AND ALBUTEROL SULFATE 3 ML: .5; 3 SOLUTION RESPIRATORY (INHALATION) at 21:22

## 2017-05-24 RX ADMIN — METOPROLOL SUCCINATE 100 MG: 100 TABLET, EXTENDED RELEASE ORAL at 08:13

## 2017-05-24 RX ADMIN — UMECLIDINIUM 1 PUFF: 62.5 AEROSOL, POWDER ORAL at 08:12

## 2017-05-24 RX ADMIN — CITALOPRAM HYDROBROMIDE 20 MG: 20 TABLET ORAL at 08:15

## 2017-05-24 RX ADMIN — SPIRONOLACTONE 25 MG: 25 TABLET ORAL at 20:51

## 2017-05-24 RX ADMIN — WARFARIN SODIUM 10 MG: 10 TABLET ORAL at 18:32

## 2017-05-24 RX ADMIN — AMIODARONE HYDROCHLORIDE 100 MG: 100 TABLET ORAL at 08:15

## 2017-05-24 RX ADMIN — Medication 75 MG: at 21:50

## 2017-05-24 RX ADMIN — BUMETANIDE 2 MG: 2 TABLET ORAL at 20:52

## 2017-05-24 RX ADMIN — LIDOCAINE 1 PATCH: 50 PATCH TOPICAL at 15:04

## 2017-05-24 RX ADMIN — IPRATROPIUM BROMIDE AND ALBUTEROL SULFATE 3 ML: .5; 3 SOLUTION RESPIRATORY (INHALATION) at 10:54

## 2017-05-24 RX ADMIN — HYDRALAZINE HYDROCHLORIDE 10 MG: 10 TABLET, FILM COATED ORAL at 15:03

## 2017-05-24 RX ADMIN — LOSARTAN POTASSIUM 25 MG: 25 TABLET, FILM COATED ORAL at 16:53

## 2017-05-24 NOTE — MR AVS SNAPSHOT
"              After Visit Summary   2017    Jim Willingham    MRN: [id number]           Patient Information     Date Of Birth          1955        Visit Information        Provider Department      2017 6:00 AM [facility]        Today's Diagnoses     NICM (nonischemic cardiomyopathy) (H)/ EF 20%    -  1       Follow-ups after your visit        Who to contact     If you have questions or need follow up information about today's clinic visit or your schedule please contact [facility] directly at [phone].  Normal or non-critical lab and imaging results will be communicated to you by [employer], letter or phone within 4 business days after the clinic has received the results. If you do not hear from us within 7 days, please contact the clinic through [place] [facility] or phone. If you have a critical or abnormal lab result, we will notify you by phone as soon as possible.  Submit refill requests through [employer] or call your pharmacy and they will forward the refill request to us. Please allow 3 business days for your refill to be completed.          Additional Information About Your Visit        MyChart Information     [employer] lets you send messages to your doctor, view your test results, renew your prescriptions, schedule appointments and more. To sign up, go to [email or web address] . Click on \"Log in\" on the left side of the screen, which will take you to the Welcome page. Then click on \"Sign up Now\" on the right side of the page.     You will be asked to enter the access code listed below, as well as some personal information. Please follow the directions to create your username and password.     Your access code is: [email or web address]  Expires: 2017  6:30 AM     Your access code will  in 90 days. If you need help or a new code, please call your [place] clinic or [phone].        Care EveryWhere ID     This is your Care EveryWhere ID. This could be used by other " organizations to access your [facility] medical records  [id number]         Blood Pressure from Last 3 Encounters:   05/24/17 101/52   05/12/17 98/63    Weight from Last 3 Encounters:   05/24/17 120.4 kg (265 lb 6.4 oz)   05/12/17 119 kg (262 lb 4.8 oz)              We Performed the Following     INTERROGATION DEVICE EVAL REMOTE, PACER/ICD          Today's Medication Changes      Notice     This visit is during an admission. Changes to the med list made in this visit will be reflected in the After Visit Summary of the admission.             Primary Care Provider Office Phone # Fax #    Chase Molina [phone]       [facility] HEART [facility] [address] [address]  [place] [address]        Thank you!     Thank you for choosing [facility]  for your care. Our goal is always to provide you with excellent care. Hearing back from our patients is one way we can continue to improve our services. Please take a few minutes to complete the written survey that you may receive in the mail after your visit with us. Thank you!             Your Updated Medication List - Protect others around you: Learn how to safely use, store and throw away your medicines at www.disposemymeds.org.      Notice     This visit is during an admission. Changes to the med list made in this visit will be reflected in the After Visit Summary of the admission.

## 2017-05-24 NOTE — PLAN OF CARE
Problem: Goal Outcome Summary  Goal: Goal Outcome Summary  Outcome: No Change  D: Pt admitted for LVAD workup.  I/A: VSS. Denies pain. Paced rhythm. US ordered for today. NPO until test complete.  P: Continue to monitor.

## 2017-05-24 NOTE — PROGRESS NOTES
Scheduled Medtronic Multi Lead ICD remote transmission received and reviewed. Device transmission sent per MD orders. His presenting rhythm is AS/BVP @ 75 BPM. 1 NSVT  episodes recorded on 5/16/2017 for 1 second at a rate of 194 BPM. Normal device function. AP= 7.0% and = 89.9%. 0 short V-V intervals recorded. Lead trends appear stable. OptiVol thoracic impedance at. Battery estimates 5.6 years to MARVA. Cards fellow notified of transmission results.

## 2017-05-24 NOTE — PLAN OF CARE
Problem: Individualization  Goal: Patient Preferences  Outcome: No Change  Admission     Pt admitted for right heart cath with anticipation of swan placement. Pt had right heart cath but numbers did not indicate swan placement. Pt admitted to unit for medical management and LVAD work up. Pt is alert and oriented, retired respiratory therapist. Pt also recently adopted 3 year old great granddaughter. Pt admitted to unit. Pt has had head CT,CXR,multiple labs, sputum cultures sent. Pending: UA,abdominal and LE US, neuropsych eval. LVAD coordinator notified. Pt has PIV in left FA,SL. Pt uses CPAP at night. Pt's VSS,pt is paced. Pt up independently. Pt did c/o of left knee pain and swelling started 2 days ago. Pt states better today. POC continue tests for LVAD W/U    ADD: UA sent

## 2017-05-24 NOTE — PROGRESS NOTES
D:  SW received order to see patient for psychosocial assessment as part of VAD workup.   I:  Acknowledged order. Attempted to see, but patient out of room for procedure  A/P:  Aware of need for evaluation. Will see when patient and this writer available.

## 2017-05-24 NOTE — PROGRESS NOTES
Care Coordinator Progress Note     Admission Date/Time:  5/23/2017  Attending MD:  Rose Conte MD  Data  Chart reviewed, discussed with interdisciplinary team.   Patient was admitted for: Systolic heart failure (H).    Concerns with insurance coverage for discharge needs: None.  Current Living Situation: Patient lives with spouse.  Support System: Supportive and Involved spouse  Services Involved: [facility], [facility] for outpt INR and warfarin monitoring.  Transportation: Family or Friend will provide  Barriers to Discharge: Medical plan of care   Assessment  Per MD, pt undergoing LVAD workup.  Pt already set up with outpt INR and warfarin monitoring through his PCP, Dr. Molina at the [facility].  Pt states he has a home cpap and nebulizer machine.     Plan  Anticipated Discharge Date: TBD  Anticipated Discharge Plan: TBD  CC will continue to monitor patient's medical condition and progress towards discharge.  Rin Ceballos RN BSN  6C Unit Care Coordinator  Phone number: [phone]  Pager: [phone]

## 2017-05-24 NOTE — PROGRESS NOTES
PROGRESS NOTE    05/24/17    Jim Willingham ([id number]) admitted on 5/23/2017    Assessment & Plan:  Jim Willingham is a 61 year old male with PMH of NICM (EF 20%) s/p Bi-V ICD, severe MR, VT, paroxsymal afib, DM, HTN, CKD, CECILIA, Depression admitted for initiation of LVAD workup.     NICM (EF20%)  Combined Systolic Diastolic Heart Failure  Longstanding heart failure, recent referral to [facility] for consideration of advanced therapies. Clinic visit with Dr. Montgomery 5/12 with plan for admission for medical optimization and LVAD workup, with plan for consideration of heart transplant in future. RHC prior to admission, appears to be doing well on current medical regimen.  - Continue Metoprolol- mg BID,spironolactone 25 mg daily, losartan 25 mg daily, hydralazine 10 mg daily  -Bumex increased to 2 mg PO BID;   - LVAD orderset placed, has completed CXR and Head CT without acute pathology       Work up:   -consult- CVTS, palliative, neuropsychology  -labs: digoxin level, PTT, Cardiolipin ab, PRA single antigen IgG ab, HLA typing, lupus panel, LDH, vitamin b12, phosphorus, transferrin, ferritin, iron and iron binding capacity, cystatin C, TSH, uric acid, ESR, hbA1c, ABO/ RH type and screen  -imaging- Head CT- normal; US abdomen with cholelithiasis, xray- mild perihilar opacities  -pending - Chest CT, US carotid bilateral, US lower extremity with DEONTE      Asthma/COPD  Recently completed extended steroid taper (~ 1 year). S/p doxycycline course for possible exacerbation. Does endorses productive cough. No opacities on CXR.  - Continue Breo ellipta, umeclidinium, singulair, PRN albuterol  - Sputum culture pending  -concern for flare of COPD exacerbation with new onset cough - will watch for symptoms        Possible Gout  Acute onset periodic pain of joints (thumb, toe, now left knee) accompanied by significant swelling and decreased mobility that self-resolves after several days. Possibly gout, never worked up before. Has  "spent much of past year on steroids as above.   - Check CRP, ESR, Uric acid to have baseline  - If knee pain worsens could consider xray     Chronic Conditions  VT: Continue amiodarone 100 mg daily and digoxin 125 mcg daily, will check TSH and dig level in am  Paroxsymal Afib: On amiodarone, digoxin as above, Pharmacy consult for warfarin  CKD: Baseline creatinine per chart review ~ 1.5, currently 1.41  DM: Hold metformin, low sliding scale insulin with hypoglycemia protocol  CECILIA: CPAP on home settings  Depression: Continue home citalopram 20 mg daily       FEN: cardiac diet  Prophylaxis: on warfarin   Disposition: plan to d/c tm   Code Status: full code     Patient was seen and plan of care discussed with MD Analia vOiedo MBBS  PGY-3  P;[phone]      I have reviewed today's vital signs, notes, medications, labs and imaging. I have also seen and examined the patient and agree with the findings and plan as outlined above.    Rose Conte MD  Section Head - Advanced Heart Failure, Transplantation and Mechanical Circulatory Support  Co-Director - Adult Congenital and Cardiovascular [facility]etics [facility]  Associate Professor of Medicine, [facility]        Subjective:  Pt complaining of having cough with green colored sputum and this has been going on for past 2 days  Ongoing shortness of breath ;       Objective:  Most recent vital signs:  /52 (BP Location: Left arm)  Pulse 73  Temp 98.7  F (37.1  C) (Oral)  Resp 18  Ht 1.727 m (5' 8\")  Wt 120.4 kg (265 lb 6.4 oz)  SpO2 93%  BMI 40.35 kg/m2  Temp:  [95.5  F (35.3  C)-100.1  F (37.8  C)] 98.7  F (37.1  C)  Pulse:  [73-74] 73  Heart Rate:  [70-74] 70  Resp:  [18-20] 18  BP: ()/(52-71) 101/52  SpO2:  [92 %-94 %] 93 %  Wt Readings from Last 2 Encounters:   05/24/17 120.4 kg (265 lb 6.4 oz)   05/12/17 119 kg (262 lb 4.8 oz)       Intake/Output Summary (Last 24 hours) at 05/24/17 1502  Last data filed at 05/24/17 1143   " Gross per 24 hour   Intake              480 ml   Output              550 ml   Net              -70 ml       Physical exam:  General: Patient lying comfortably in bed, NAD  HEENT: EOMI, PERRL, no scleral icterus or injection, no bruits appreciated, no JVD on upright position  Cardiac: RRR, no m/r/g appreciated.  Respiratory: decreased breath sounds b/l   GI: abdominal distension; soft , non tender, bowel sounds noted   Extremities:  LE edema 1+, pulses DP 2+, radial pulses 2+   Skin: No acute lesions appreciated  Neuro: AOx3, CN II-XII grossly intact, normal muscle power, normal sensory function    Labs (Past three days):  CBC  Recent Labs  Lab 05/24/17  0652 05/23/17  1157   WBC 5.0 7.5   RBC 3.56* 3.79*   HGB 10.7* 11.3*   HCT 33.5* 36.2*   MCV 94 96   MCH 30.1 29.8   MCHC 31.9 31.2*   RDW 14.0 14.0   * 151     BMP  Recent Labs  Lab 05/24/17  0652 05/23/17  1157    135   POTASSIUM 4.0 4.2   CHLORIDE 103 101   CO2 26 27   ANIONGAP 9 8   * 123*   BUN 45* 46*   CR 1.41* 1.52*   GFRESTIMATED 51* 47*   GFRESTBLACK 62 57*   QAMAR 8.4* 8.5   MAG 2.1 1.7   PHOS 2.7  --      Troponins:     INR  Recent Labs  Lab 05/24/17  0652 05/23/17  1157   INR 1.28* 1.36*     Liver panel  Recent Labs  Lab 05/23/17  1157   PROTTOTAL 6.6*   ALBUMIN 3.5   BILITOTAL 1.1   ALKPHOS 72   AST 26   ALT 44       Imaging/procedure results:  FINDINGS: Left chest wall implantable cardiac defibrillator and  pacemaker generator and leads are intact. Cardiac silhouette is  enlarged. Perihilar opacities present. No pleural effusions. No  pneumothorax. No acute bony abnormalities.         IMPRESSION: Mild perihilar opacities, which may represent pulmonary  edema versus atelectasis.

## 2017-05-24 NOTE — PROGRESS NOTES
CVTS:     Aware of consult for LVAD placement.   Will discuss with Dr Quigley.         Nash Amado PA-C  Cardiothoracic Surgery  Pager [phone]    2:51 PM   May 24, 2017

## 2017-05-24 NOTE — PROGRESS NOTES
CLINICAL NUTRITION SERVICES - ASSESSMENT NOTE     Nutrition Prescription    RECOMMENDATIONS FOR MDs/PROVIDERS TO ORDER:  None at this time    Recommendations already ordered by Registered Dietitian (RD):  Prn oral supplement order    Future/Additional Recommendations:  1. Advance diet post-procedure as able to a 2 g Sodium diet. Also, consider fluid restriction if needed.   2. Monitor BG. Hgb A1c of 6.7 on 5/24/17 and DM hx.   3. Due to gastric bypass history, rec order a multivitamin with minerals, vitamin B 12 (500 mcg oral daily or 1000 mcg injection once per month), and calcium with vitamin D TID, and optional vitamin B complex  4. Consider checking vitamin A, vitamin D, and vitamin E, vitamin B1, PTH, folate, and zinc.  5. Rec thiamine (100 mg/day) if pt has NYHA Class III-IV heart failure.   6. If pt should need TFs post-op, would rec place feeding tube (enter XR Feeding Tube Placement order for Radiology to place) and initiate TFs, Impact Peptide 1.5 (immunonutrition post-op).  Initiate TFs at 10 mL/hr, advancing rate by 10 mL Q 8 hrs (or per provider discretion, pending hemodynamic stability) to goal rate of 60 mL/hr. This provides 1440 mL TF, 2160 kcals, 135 g PRO, 1109 ml free H2O, 92 g Fat (50% from MCTs), 202 g CHO and no fiber daily.         If begin tube feeds:    - Flush feeding tube (FT) with 30 mL water Q 4 hrs for patency. Rec provider adjust free water flushes as needed, pending fluid status.   - Ensure K+/Mg++/Phos labs are ordered daily until TFs advance to goal infusion to evaluate for sx of refeeding with nutrition received. If lytes trend low, aggressively replace lytes.       - If not already ordered, order a multivitamin/mineral (certavite 15 mL/day via FT) to help ensure micronutrient needs being met with suspected hypermetabolic demands and potential interruptions to TF infusions.   - Monitor TF and possible need to adjust nutrition support regimen if necessary, pending medical course and  "nutrition status.       - If Impact Peptide 1.5 TF is started, order a baseline CRP lab and then CRP labs for the subsequent two Mondays.     REASON FOR ASSESSMENT  Jim Willingham is a/an 61 year old male assessed by the dietitian for Provider Order - Heart Failure pre VAD planning    NUTRITION HISTORY  Per H & P, pt feels he has some fluid accumulation in his abdomen. H & P indicates pt has not made any diet changes.  H/o vitamin B 12 deficiency.  Per chart, pt ordered to take vitamin B 12 and zinc supplementation. He had a Laparoscopic Sylvester-en-Y gastric bypass with right fascia maira autograft reinforcement of gastrojejunostomy on 11/14/2003 (wt 327.9# or 149 kg on surgery date).  Pt was at U/S during attempt to see. Per discussion with pt's wife (Cate), excellent appetite PTA. Eating adequately or more than adequately despite early satiety. Has been educated on a low-sodium diet and has not been asked to follow a fluid restriction in the past. Per Cate, has not consumed oral supplements in the past.     CURRENT NUTRITION ORDERS  Diet: NPO currently. Previously on a 2 g sodium diet.   Intake/Tolerance: Good diet tolerance per chart.     LABS  Labs reviewed    MEDICATIONS  Medications reviewed    ANTHROPOMETRICS  Height: 172.7 cm (5' 8\")  Most Recent Weight: 120.4 kg (265 lb 6.4 oz)    IBW: 70 kg   BMI: (Morbid) Obesity Grade III BMI >40  Weight History: 116.5 kg (3/13/10), 121.1 kg (1/26/16), 121.3 kg (5/6/17) - Per discussion with pt's wife, wt changes due to changes in fluid status. Per pt's wife, his usual body wt is about 250# (113.6 kg).   Dosing Weight: 83 kg (adjusted, based on lowest wt this admission of 119.9 kg on 5/23)    ASSESSED NUTRITION NEEDS  Estimated Energy Needs: 8078-5836 kcals/day (20 - 25 kcals/kg)  Justification: Weight status, but needs increased with cardiac status  Estimated Protein Needs:  grams protein/day (1.1 - 1.4 grams of pro/kg)  Justification: Increased needs with cardiac " status. If LVAD is placed, protein needs will increase to 1.5-2 g protein/kg (125-166 g protein/day).  Estimated Fluid Needs: 7985-4484 mL/day (20 - 25 mL/kg)   Justification: Maintenance needs with fluid status, or per provider    PHYSICAL FINDINGS  See malnutrition section below.    MALNUTRITION  % Intake: No decreased intake noted  % Weight Loss: Difficult to assess wt changes with changes in fluid status and diuresis  Subcutaneous Fat Loss: Unable to assess, pt not in room  Muscle Loss: Unable to assess, pt not in room  Fluid Accumulation/Edema: Does not meet criteria  Malnutrition Diagnosis: Unable to determine due to pt not in room.    NUTRITION DIAGNOSIS  Predicted inadequate nutrient intake (protein-energy) related to NPO status for tests and procedures and pt with early satiety.     INTERVENTIONS  Implementation  1. Nutrition education for nutrition relationship to health/disease (pt's wife, Cate): Reviewed low-sodium nutrition education. Discussed high sodium foods to limit and low-sodium foods to chew. Gave examples of seasoning foods without adding salt. Reviewed label reading. One possible barrier to diet compliance is boredom. Handouts provided: How to Read Nutrition Labels, Low-Sodium Foods and Drinks, Tips for a Low-Sodium Diet, Low-sodium Recipes, Heart-Healthy Recipes, Managing Your Fluid Intake, and Seasoning Your Foods Without Adding Salt. Did discuss current wt status and recommendations for wt loss. Discussed potential nutrition considerations if/when LVAD is placed. Also, discussed possible decreased oral intake post-op and rec to increase protein intake post-op. Cate displayed good understanding of the education. Anticipate fair pt diet compliance post-LVAD.   2. Medical food supplement therapy: Placed prn supplement order. Pt to ask for snacks or oral supplements prn.      Goals  Patient to consume % of nutritionally adequate meal trays TID, or the equivalent with supplements/snacks.      Monitoring/Evaluation  Progress toward goals will be monitored and evaluated per protocol.     Nutrition will continue to follow.    Sana Costello, MS, RD, LD, [facility]   6C Pgr:  [phone]

## 2017-05-25 ENCOUNTER — APPOINTMENT (OUTPATIENT)
Dept: ULTRASOUND IMAGING | Facility: CLINIC | Age: 62
DRG: 287 | End: 2017-05-25
Attending: INTERNAL MEDICINE
Payer: COMMERCIAL

## 2017-05-25 ENCOUNTER — DOCUMENTATION ONLY (OUTPATIENT)
Dept: CARDIOLOGY | Facility: CLINIC | Age: 62
End: 2017-05-25

## 2017-05-25 LAB
ANION GAP SERPL CALCULATED.3IONS-SCNC: 9 MMOL/L (ref 3–14)
BACTERIA SPEC CULT: NORMAL
BUN SERPL-MCNC: 45 MG/DL (ref 7–30)
CALCIUM SERPL-MCNC: 8.5 MG/DL (ref 8.5–10.1)
CHLORIDE SERPL-SCNC: 102 MMOL/L (ref 94–109)
CO2 SERPL-SCNC: 26 MMOL/L (ref 20–32)
CREAT SERPL-MCNC: 1.51 MG/DL (ref 0.66–1.25)
ERYTHROCYTE [DISTWIDTH] IN BLOOD BY AUTOMATED COUNT: 14 % (ref 10–15)
ERYTHROCYTE [SEDIMENTATION RATE] IN BLOOD BY WESTERGREN METHOD: 21 MM/H (ref 0–20)
GFR SERPL CREATININE-BSD FRML MDRD: 47 ML/MIN/1.7M2
GLUCOSE BLDC GLUCOMTR-MCNC: 119 MG/DL (ref 70–99)
GLUCOSE BLDC GLUCOMTR-MCNC: 130 MG/DL (ref 70–99)
GLUCOSE SERPL-MCNC: 114 MG/DL (ref 70–99)
HCT VFR BLD AUTO: 34 % (ref 40–53)
HGB BLD-MCNC: 10.8 G/DL (ref 13.3–17.7)
INR PPP: 1.24 (ref 0.86–1.14)
LA PPP-IMP: NORMAL
LAB SCANNED RESULT: NORMAL
MAGNESIUM SERPL-MCNC: 1.9 MG/DL (ref 1.6–2.3)
MCH RBC QN AUTO: 29.9 PG (ref 26.5–33)
MCHC RBC AUTO-ENTMCNC: 31.8 G/DL (ref 31.5–36.5)
MCV RBC AUTO: 94 FL (ref 78–100)
MICRO REPORT STATUS: NORMAL
PLATELET # BLD AUTO: 161 10E9/L (ref 150–450)
POTASSIUM SERPL-SCNC: 4 MMOL/L (ref 3.4–5.3)
PSA SERPL-ACNC: 0.19 UG/L (ref 0–4)
RBC # BLD AUTO: 3.61 10E12/L (ref 4.4–5.9)
SODIUM SERPL-SCNC: 137 MMOL/L (ref 133–144)
SPECIMEN SOURCE: NORMAL
TRANSFERRIN SERPL-MCNC: 224 MG/DL (ref 210–360)
URATE SERPL-MCNC: 9.7 MG/DL (ref 3.5–7.2)
WBC # BLD AUTO: 5.8 10E9/L (ref 4–11)

## 2017-05-25 PROCEDURE — 83735 ASSAY OF MAGNESIUM: CPT | Performed by: INTERNAL MEDICINE

## 2017-05-25 PROCEDURE — 93922 UPR/L XTREMITY ART 2 LEVELS: CPT

## 2017-05-25 PROCEDURE — 25000132 ZZH RX MED GY IP 250 OP 250 PS 637: Performed by: INTERNAL MEDICINE

## 2017-05-25 PROCEDURE — 80048 BASIC METABOLIC PNL TOTAL CA: CPT | Performed by: INTERNAL MEDICINE

## 2017-05-25 PROCEDURE — 85027 COMPLETE CBC AUTOMATED: CPT | Performed by: INTERNAL MEDICINE

## 2017-05-25 PROCEDURE — 94640 AIRWAY INHALATION TREATMENT: CPT

## 2017-05-25 PROCEDURE — 84550 ASSAY OF BLOOD/URIC ACID: CPT | Performed by: INTERNAL MEDICINE

## 2017-05-25 PROCEDURE — 25000125 ZZHC RX 250: Performed by: INTERNAL MEDICINE

## 2017-05-25 PROCEDURE — 25000132 ZZH RX MED GY IP 250 OP 250 PS 637: Performed by: STUDENT IN AN ORGANIZED HEALTH CARE EDUCATION/TRAINING PROGRAM

## 2017-05-25 PROCEDURE — 36415 COLL VENOUS BLD VENIPUNCTURE: CPT | Performed by: INTERNAL MEDICINE

## 2017-05-25 PROCEDURE — 85652 RBC SED RATE AUTOMATED: CPT | Performed by: INTERNAL MEDICINE

## 2017-05-25 PROCEDURE — G0103 PSA SCREENING: HCPCS | Performed by: INTERNAL MEDICINE

## 2017-05-25 PROCEDURE — 21400006 ZZH R&B CCU INTERMEDIATE UMMC

## 2017-05-25 PROCEDURE — 85610 PROTHROMBIN TIME: CPT | Performed by: INTERNAL MEDICINE

## 2017-05-25 PROCEDURE — 40000275 ZZH STATISTIC RCP TIME EA 10 MIN

## 2017-05-25 PROCEDURE — 94640 AIRWAY INHALATION TREATMENT: CPT | Mod: 76

## 2017-05-25 PROCEDURE — 99232 SBSQ HOSP IP/OBS MODERATE 35: CPT | Mod: GC | Performed by: INTERNAL MEDICINE

## 2017-05-25 PROCEDURE — 00000146 ZZHCL STATISTIC GLUCOSE BY METER IP

## 2017-05-25 RX ORDER — BUMETANIDE 2 MG/1
2 TABLET ORAL 2 TIMES DAILY
Qty: 60 TABLET | Refills: 0 | Status: SHIPPED | OUTPATIENT
Start: 2017-05-25 | End: 2017-05-31

## 2017-05-25 RX ORDER — WARFARIN SODIUM 10 MG/1
10 TABLET ORAL
Status: COMPLETED | OUTPATIENT
Start: 2017-05-25 | End: 2017-05-25

## 2017-05-25 RX ADMIN — LIDOCAINE 1 PATCH: 50 PATCH TOPICAL at 14:27

## 2017-05-25 RX ADMIN — HYDRALAZINE HYDROCHLORIDE 10 MG: 10 TABLET, FILM COATED ORAL at 14:27

## 2017-05-25 RX ADMIN — METOPROLOL SUCCINATE 100 MG: 100 TABLET, EXTENDED RELEASE ORAL at 15:58

## 2017-05-25 RX ADMIN — FLUTICASONE FUROATE AND VILANTEROL TRIFENATATE 1 PUFF: 200; 25 POWDER RESPIRATORY (INHALATION) at 08:09

## 2017-05-25 RX ADMIN — WARFARIN SODIUM 10 MG: 10 TABLET ORAL at 18:09

## 2017-05-25 RX ADMIN — HYDRALAZINE HYDROCHLORIDE 10 MG: 10 TABLET, FILM COATED ORAL at 08:10

## 2017-05-25 RX ADMIN — ACETAMINOPHEN 650 MG: 325 TABLET, FILM COATED ORAL at 21:09

## 2017-05-25 RX ADMIN — Medication 75 MG: at 22:47

## 2017-05-25 RX ADMIN — SPIRONOLACTONE 25 MG: 25 TABLET ORAL at 08:09

## 2017-05-25 RX ADMIN — SPIRONOLACTONE 25 MG: 25 TABLET ORAL at 20:59

## 2017-05-25 RX ADMIN — DIGOXIN 125 MCG: 125 TABLET ORAL at 08:10

## 2017-05-25 RX ADMIN — LOSARTAN POTASSIUM 25 MG: 25 TABLET, FILM COATED ORAL at 15:58

## 2017-05-25 RX ADMIN — GUAIFENESIN 10 ML: 100 SOLUTION ORAL at 20:59

## 2017-05-25 RX ADMIN — CITALOPRAM HYDROBROMIDE 20 MG: 20 TABLET ORAL at 08:09

## 2017-05-25 RX ADMIN — METOPROLOL SUCCINATE 100 MG: 100 TABLET, EXTENDED RELEASE ORAL at 08:09

## 2017-05-25 RX ADMIN — IPRATROPIUM BROMIDE AND ALBUTEROL SULFATE 3 ML: .5; 3 SOLUTION RESPIRATORY (INHALATION) at 06:22

## 2017-05-25 RX ADMIN — UMECLIDINIUM 1 PUFF: 62.5 AEROSOL, POWDER ORAL at 08:09

## 2017-05-25 RX ADMIN — BUMETANIDE 2 MG: 2 TABLET ORAL at 15:58

## 2017-05-25 RX ADMIN — LOSARTAN POTASSIUM 25 MG: 25 TABLET, FILM COATED ORAL at 08:09

## 2017-05-25 RX ADMIN — HYDRALAZINE HYDROCHLORIDE 10 MG: 10 TABLET, FILM COATED ORAL at 20:59

## 2017-05-25 RX ADMIN — IPRATROPIUM BROMIDE AND ALBUTEROL SULFATE 3 ML: .5; 3 SOLUTION RESPIRATORY (INHALATION) at 21:36

## 2017-05-25 RX ADMIN — MONTELUKAST SODIUM 10 MG: 10 TABLET, FILM COATED ORAL at 21:02

## 2017-05-25 RX ADMIN — AMIODARONE HYDROCHLORIDE 100 MG: 100 TABLET ORAL at 08:09

## 2017-05-25 RX ADMIN — BUMETANIDE 2 MG: 2 TABLET ORAL at 08:09

## 2017-05-25 NOTE — PROGRESS NOTES
"Met with patient, wifeto present the HM2, HM3 and HW VAD(s) as possible treatment option.     Discussed patient and caregiver responsibilities before and after VAD implant.  Clarified the need for a caregiver to be present for education and to assist patient for at least first 30 days after a patient returns home. Patient's designated caregiver is his wife.     Discussed basic overview of VAD equipment, on going management, need for anticoagulation, regular dressing change, grounded three-pronged outlet and functioning telephone. Also discussed frequency of follow-up clinic appointments and the need to stay locally for at least 2-4 weeks.  Reviewed restrictions of having an VAD such as no swimming, bathing, boats, MRI's, or arc welding.     Provided and discussed the VAD educational brochures, information regarding the VAD and transplant support groups, information on [employer] registry, and \"VAD What You Should Know\" with additional details. Patient and wife signed \"VAD What You Should Know\" document. VAD coordinator contact information provided.  Encouraged patient, wife to call with questions.  "

## 2017-05-25 NOTE — PROGRESS NOTES
PROGRESS NOTE    05/25/17    Jim Willingham ([id number]) admitted on 5/23/2017    Assessment & Plan:  Jim Willingham is a 61 year old male with PMH of NICM (EF 20%) s/p Bi-V ICD, severe MR, VT, paroxsymal afib, DM, HTN, CKD, CECILIA, Depression admitted for initiation of LVAD workup.     NICM (EF20%)  Combined Systolic Diastolic Heart Failure  Longstanding heart failure, recent referral to [facility] for consideration of advanced therapies. Clinic visit with Dr. Montgomery 5/12 with plan for admission for medical optimization and LVAD workup, with plan for consideration of heart transplant in future. RHC prior to admission, appears to be doing well on current medical regimen.  - Continue Metoprolol- mg BID,spironolactone 25 mg daily, losartan 25 mg daily, hydralazine 10 mg TID  -Bumex increased to 2 mg PO BID;   - LVAD orderset placed, has completed CXR and Head CT without acute pathology       Work up:   -consult- CVTS, palliative, neuropsychology  -labs: digoxin level, PTT, Cardiolipin ab, PRA single antigen IgG ab, HLA typing, lupus panel, LDH, vitamin b12, phosphorus, transferrin, ferritin, iron and iron binding capacity, cystatin C, TSH, uric acid, ESR, hbA1c, ABO/ RH type and screen  -imaging- Head CT- normal; US abdomen with cholelithiasis, xray- mild perihilar opacities  -pending - Chest CT, US carotid bilateral, US lower extremity with DEONTE      Most of the above work up is completed and remaining is health psychology and palliative assessment ; plan for d/c tomorrow          Asthma/COPD  Recently completed extended steroid taper (~ 1 year). S/p doxycycline course for possible exacerbation. Does endorses productive cough. No opacities on CXR.  - Continue Breo ellipta, umeclidinium, singulair, PRN albuterol  - Sputum culture pending   -continue to watch symptoms ; procalcitonin negative        Chronic Conditions  VT: Continue amiodarone 100 mg daily and digoxin 125 mcg daily,   Paroxsymal Afib: On amiodarone,  "digoxin as above, Pharmacy consult for warfarin  CKD: Baseline creatinine per chart review ~ 1.5, currently 1.41  DM: Hold metformin, low sliding scale insulin with hypoglycemia protocol  CECILIA: CPAP on home settings  Depression: Continue home citalopram 20 mg daily       FEN: cardiac diet  Prophylaxis: on warfarin   Disposition: plan to d/c tm   Code Status: full code     Patient was seen and plan of care discussed with MD Analia Oviedo MBBS  PGY-3  P;[phone]      Late entry - pt seen on 5/25/17  I have reviewed today's vital signs, notes, medications, labs and imaging. I have also seen and examined the patient and agree with the findings and plan as outlined above.    Rose Conte MD  Section Head - Advanced Heart Failure, Transplantation and Mechanical Circulatory Support  Co-Director - Adult Congenital and Cardiovascular Genetics Center  Associate Professor of Medicine, [facility]      Subjective:  Pt complaining of having cough with green colored sputum and this has been going on for past 2 days  Feels better but still bothered by cough           Objective:  Most recent vital signs:  BP 98/66 (BP Location: Left arm)  Pulse 69  Temp 98.5  F (36.9  C) (Oral)  Resp 20  Ht 1.727 m (5' 8\")  Wt 118.7 kg (261 lb 11.2 oz)  SpO2 95%  BMI 39.79 kg/m2  Temp:  [98.2  F (36.8  C)-99.4  F (37.4  C)] 98.5  F (36.9  C)  Pulse:  [69-93] 69  Heart Rate:  [] 69  Resp:  [18-20] 20  BP: ()/(62-66) 98/66  SpO2:  [93 %-96 %] 95 %  Wt Readings from Last 2 Encounters:   05/25/17 118.7 kg (261 lb 11.2 oz)   05/12/17 119 kg (262 lb 4.8 oz)       Intake/Output Summary (Last 24 hours) at 05/24/17 1502  Last data filed at 05/24/17 1143   Gross per 24 hour   Intake              480 ml   Output              550 ml   Net              -70 ml       Physical exam:  General: Patient lying comfortably in bed, NAD  HEENT: EOMI, PERRL, no scleral icterus or injection, no bruits appreciated, no " JVD on upright position  Cardiac: RRR, no m/r/g appreciated.  Respiratory: decreased breath sounds b/l   GI: abdominal distension; soft , non tender, bowel sounds noted   Extremities:  LE edema 1+, pulses DP 2+, radial pulses 2+   Skin: No acute lesions appreciated  Neuro: AOx3, CN II-XII grossly intact, normal muscle power, normal sensory function    Labs (Past three days):  CBC    Recent Labs  Lab 05/25/17  0742 05/24/17  0652 05/23/17  1157   WBC 5.8 5.0 7.5   RBC 3.61* 3.56* 3.79*   HGB 10.8* 10.7* 11.3*   HCT 34.0* 33.5* 36.2*   MCV 94 94 96   MCH 29.9 30.1 29.8   MCHC 31.8 31.9 31.2*   RDW 14.0 14.0 14.0    142* 151     BMP    Recent Labs  Lab 05/25/17  0742 05/24/17  0652 05/23/17  1157    138 135   POTASSIUM 4.0 4.0 4.2   CHLORIDE 102 103 101   CO2 26 26 27   ANIONGAP 9 9 8   * 111* 123*   BUN 45* 45* 46*   CR 1.51* 1.41* 1.52*   GFRESTIMATED 47* 51* 47*   GFRESTBLACK 57* 62 57*   QAMAR 8.5 8.4* 8.5   MAG 1.9 2.1 1.7   PHOS  --  2.7  --      Troponins:     INR    Recent Labs  Lab 05/25/17  0742 05/24/17  0652 05/23/17  1157   INR 1.24* 1.28* 1.36*     Liver panel    Recent Labs  Lab 05/23/17  1157   PROTTOTAL 6.6*   ALBUMIN 3.5   BILITOTAL 1.1   ALKPHOS 72   AST 26   ALT 44       Imaging/procedure results:  FINDINGS: Left chest wall implantable cardiac defibrillator and  pacemaker generator and leads are intact. Cardiac silhouette is  enlarged. Perihilar opacities present. No pleural effusions. No  pneumothorax. No acute bony abnormalities.         IMPRESSION: Mild perihilar opacities, which may represent pulmonary  edema versus atelectasis.

## 2017-05-25 NOTE — CONSULTS
"[facility]  Palliative Care Consultation         Jim Willingham MRN# [id number]   Age: 61 year old YOB: 1955   Date of Admission: 2017    Reason for consult: LVAD Preparedness Planning       Requesting physician/service: Rose Conte MD       Recommendations        Visited with Jim Willingham today. I introduced palliative care and discussed ways in which we can be helpful including symptom management, decision making (goals of care), and additional support. We spent time discussing the below topics related to LVAD preparedness planning.     Of note, Jim does describe his wife's experience with her first  in which he  shortly after a heart transplant surgery, this is also noted as causing increased anxiety and fear per Clara Casiano's note on . He feels that she is \"against the LVAD\" while we are discussing this life experience that his wife has had. He clearly also states that he makes his own decisions about his medical care with assistance of knowledge and expertise from his doctors. He believes he was given a purpose to take care if his granddaughter, Graham, and thinks that being alive to get her through into her 20's is his biggest hope.     Pain  He describes a chronic pain in his right shoulder which he was told he needs surgery for. He has not pursued this for now as he is caring for his grandchild. He also has developed significant left elbow pain that will shoot up to a 7 when pressure is applied to the area. He feels that this has happened before and is a result of needing to lean on his elbows to catch his breath. He tells me that he felt much better when he was on a prednisone and since stopping that he has had more aches and pains. He has had difficulty sleeping at night due to the pain he feels.  -Schedule tylenol  -Add NSAID cream  -Continue PRN tramadol    LVAD preparedness plan    Patient s legally designated health care " agent: Josse Esposito    Patient s description of how they make decisions (by themselves, in consultation with certain close loved ones, based on advice from medical professionals, etc):  He makes his own decisions with advice from his medical professionals. He feels that sometimes him and his wife have slightly differing ideas on this    Patient s personal goals/hopes for receiving the LVAD: play golf, take care of his grandchild, be able to do everyday things (go to store and clean around house) without significant difficulty. He feels currently limited by his shortness of breath.    Patient s worries/concerns when considering receiving the LVAD: he is very concerned about the pain and suffering going through surgery    How does the patient envision or anticipate his/her future with the LVAD? Working more around the house to help out his wife more.    Patient s thoughts about what constitutes a  good  quality of life: he reports quality of life right now to be fairly poor, he is unable to do the things he wants to be able to do with his grandchild and also mentions that he has to frequently tell her to slow down to let him catch his breath.    Patient s thoughts about what health conditions would be unacceptable (situations the patient would want her/his doctors and loved ones to know that she/he would not want life prolonged)? Unacceptable outcomes would be living in a nursing home and living on tubes and dependent on other for care the rest of his life. He feels that if the doctors think he has no chance that he would not want life prolonged.    LVADs carry a risk of stroke which can be impairing. After a stroke, what sort of functional outcomes would be acceptable vs unacceptable to the patient? He tells me that his mother had 9 stroked and the last one was the worse where she was not able to swallow. He feels that unacceptable outcomes would be those in which he is not able to care for himself, he feels that if he  "was unable to communicate he might be able to adjust to that and find othe ways to communicate.     Chronic mechanical ventilation via a tracheostomy tube is a risk. What are the circumstances the patient would want her/his physicians and family to keep them alive with long-term mechanical ventilation vs allow them to die?  He feels that if it were the long term kind of ventilation in which he could have a more prolonged wean from the vent but not need to \"go to [place]\" that he would be ok with that. He feels that if he were to need to go to a place like [place] that would not be agreeable with him.        POLST -not discussed    TIM Godinez CNS  Palliative Care Consult Team  Pager: [phone]    70 minutes spent with patient, with >50% counseling and in care coordination.        Disease Process/es & Symptoms     Congestive heart failure  HTN  COPD  Chronic renal insufficiency  Depression  Diabetes  Gastric Bypass  Atrial fibrillation  Ventricular tachycardia  Possible gout     Support/Coping   (We typically ask each of our patients the following questions:)    Are you Voodoo? Spiritual? Not so much? Spirituality is important to him, believes that God has brought him through his life.  What are your concerns, medical and non-medical, that are not being addressed? Pain in left elbow  Who are your \"go-to\" people when you need support? wife    Plan for psychosocial/spiritual support/follow-up from palliative team: Will discuss case during palliative interdisciplinary team rounds and involve LICSW and  as needed.     Decision-Making & Goals of Care     Discussion/counseling today about prognosis/goals of care/decisions:   See above  Patient has a completed health care directive available in the chart (Y/N): None in chart  Health care agent (only if patient has an available, complete HCD):  There is no POLST (Physician orders for life-sustaining treatment) form on file for this patient  Code " Status: Full code   Patient has decision-making capacity (Y/N): Yes    Coordination of Care     Findings & plan of care discussed with: Cardiology team  Follow-up plan from palliative team: will sign off for now given patient discharging  Thank you for involving us in the patient's care.           Chief Complaint     Pain in shoulder and elbow         History of Present Illness     History obtained from: chart review    Jim Willingham is a 61 year old male with a history of NICM and s/p ICD, severe MR, VT, paroxsymal afib, DM, HTN, CKD, CECILIA, and depression. He presented from clinic with Dr. Montgomery 5/23 for initiation of LVAD work up with a potential for heart transplant in the future. He has a reported admission recently in April for heart failure exacerbation and also recently finished a antibiotic course for COPD exacerbation. He recently transitioned his cardiology care from [facility] to [facility] for advanced heart failure therapies. There has been an noticeable decline in his health over the last year.      Palliative care team consulted 5/23 for patient with advanced heart failure now considering advanced therapies, VAD assessment, and heart failure pre VAD planning          Past Medical History:   I have reviewed this patient's past medical history  This patient  has a past medical history of Benign essential hypertension (5/11/2017); Cardiomyopathy, unspecified (H) (5/8/2017); CKD (chronic kidney disease) stage 3, GFR 30-59 ml/min (5/11/2017); Depression (5/11/2017); Diabetes mellitus (H) (5/11/2017); H/O gastric bypass (5/11/2017); ICD (implantable cardioverter-defibrillator), biventricular, in situ (5/11/2017); NICM (nonischemic cardiomyopathy) (H)/ EF 20% (5/11/2017); CECILIA (obstructive sleep apnea) (5/11/2017); Paroxysmal atrial fibrillation (H) (5/11/2017); Paroxysmal VT (H) (5/11/2017); and Vitamin B12 deficiency (non anemic) (5/11/2017).           Past Surgical History:   I have reviewed this patient's  past surgical history  This patient  has a past surgical history that includes GI surgery (2003) and orthopedic surgery (1994).            Social/Spiritual History:     Living situation: with wife  Family system: wife, grandchild- Graham, 2 children of his, wife has 4 children, 14 grandchildren and 4 great grandchildren  Functional status (needs help with ADLs or IADLs): requires more assistance with household activities  Employment/education: retired respiratory therapist  Activities/interests: loves to golf and spend time with Graham  History of substance use/abuse: former smoker            Family History:   I have reviewed this patient's family history  The family history is not on file.             Allergies:   All allergies reviewed and addressed  This patient is allergic to is allergic to ace inhibitors and sulfa drugs.           Medications:   I have reviewed this patient's medication profile and medications during this hospitalization    Medications of Note  Celexa 20 mg daily  Lidoderm patch  Trazodone 50 mg at bedtime  Tylenol PRN  Melatonin PRN           Review of Systems:   The comprehensive review of systems is negative other than noted here and in the HPI.    Palliative Symptom Review (0=no symptom/no concern, 1=mild, 2=moderate, 3=severe):      Pain: 3- with pressure      Fatigue: 1      Nausea: 0      Constipation: 0      Diarrhea: 0      Depressive Symptoms: 0      Anxiety: 0      Drowsiness: 0      Poor Appetite: 0      Shortness of Breath: 2      Insomnia: 0            Physical Exam:   Vitals were reviewed  Temp: 99  F (37.2  C) Temp src: Oral BP: 98/62 Pulse: 79 Heart Rate: 108 Resp: 20 SpO2: 93 % O2 Device: BiPAP/CPAP    Constitutional: Awake, alert, cooperative, no apparent distress  Lungs: No increased work of breathing while seated at edge of bed, wet sounding cough  Genitourinary: No urethral discharge, normal external genitalia, no hernia.  Musculoskeletal: slight swelling to left  elbow  Neurologic: Awake, alert, oriented to name, place and time.   Neuropsychiatric: Normal affect, mood, orientation, memory and insight.           Data Reviewed:     ROUTINE ICU LABS (Last four results)  CMP  Recent Labs  Lab 05/25/17  0742 05/24/17  0652 05/23/17  1157    138 135   POTASSIUM 4.0 4.0 4.2   CHLORIDE 102 103 101   CO2 26 26 27   ANIONGAP 9 9 8   * 111* 123*   BUN 45* 45* 46*   CR 1.51* 1.41* 1.52*   GFRESTIMATED 47* 51* 47*   GFRESTBLACK 57* 62 57*   QAMAR 8.5 8.4* 8.5   MAG 1.9 2.1 1.7   PHOS  --  2.7  --    PROTTOTAL  --   --  6.6*   ALBUMIN  --   --  3.5   BILITOTAL  --   --  1.1   ALKPHOS  --   --  72   AST  --   --  26   ALT  --   --  44     CBC  Recent Labs  Lab 05/25/17 0742 05/24/17  0652 05/23/17  1157   WBC 5.8 5.0 7.5   RBC 3.61* 3.56* 3.79*   HGB 10.8* 10.7* 11.3*   HCT 34.0* 33.5* 36.2*   MCV 94 94 96   MCH 29.9 30.1 29.8   MCHC 31.8 31.9 31.2*   RDW 14.0 14.0 14.0    142* 151     INR  Recent Labs  Lab 05/25/17 0742 05/24/17  0652 05/23/17  1157   INR 1.24* 1.28* 1.36*     Arterial Blood GasNo lab results found in last 7 days.

## 2017-05-25 NOTE — PLAN OF CARE
Problem: Goal Outcome Summary  Goal: Goal Outcome Summary  1. Pt will be hemodynamically stable  Outcome: Improving  VSS, A&Ox4, Paced in 60s-70s. No c/o pain, dizziness. +RESENDIZ. PO Bumex increased to BID. Pt up independently, adequate UOP, no BM today, good appetite. +BG checks, no coverage needed. Plan to continue LVAD w/u tomorrow w/neuropsych eval and LVAD teaching w/coordinator. Continue to monitor and notify Cards 2 w/changes or concerns.      Landen Dodd RN  Hours cared for; [phone]

## 2017-05-25 NOTE — CONSULTS
DATE OF VISIT:  05/24/2017      REASON FOR CONSULTATION:  I was requested to see Mr. Jim Willingham for evaluation of options for end-stage heart failure.        HISTORY OF PRESENT ILLNESS:  Mr. Willingham is a pleasant 61-year-old gentleman with a past medical history of longstanding nonischemic cardiomyopathy, severe mitral regurgitation, ventricular tachycardia, paroxysmal atrial fibrillation, diabetes who had worsening shortness of breath, fatigue and tiredness over the last several months.  His effort tolerance is less than a block over the last few weeks.  He has complained of some lightheadedness and dizzy feeling and some lower extremity swelling.  Denies any fever, chills, syncope, palpitations, loss of consciousness.  He was recently treated with a course of antibiotics for COPD exacerbation with possible pneumonia.      PAST MEDICAL HISTORY:     1.  Congestive heart failure.     2.  Hypertension.     3.  Chronic renal insufficiency.     4.  Depression.     5.  Diabetes.     6.  Gastric bypass.     7.  Atrial fibrillation.     8.  Ventricular tachycardia.      PAST SURGICAL HISTORY:  Sylvester-en-Y GI surgery.       SOCIAL HISTORY:  Former smoker, stopped smoking 20 years ago.  Denies current alcohol, drug or tobacco abuse.      FAMILY HISTORY:  No significant family history.      ALLERGIES:  Sulfa drugs, ACE inhibitors.      HOME MEDICATIONS:  Cozaar, hydralazine, albuterol, amiodarone, Bumex, Celexa, digoxin, Aldactone, Ultram, Coumadin.      REVIEW OF SYSTEMS:   CONSTITUTIONAL:  Fatigue, tiredness.   RESPIRATORY:  Short of breath.   CARDIOVASCULAR:  Congestive heart failure, atrial fibrillation, ventricular tachycardia.   GENITOURINARY:  None.   GENITOURINARY:  None.   ENDOCRINE:  None.   MUSCULOSKELETAL:  None.   PSYCHIATRIC:  None.   INTEGUMENT:  None.   EYES:  None.   ENT:  None.      PHYSICAL EXAMINATION:   VITAL SIGNS:  Afebrile, blood pressure 100/71, heart rate 82.   GENERAL:  He is awake, alert, oriented  x3, appears comfortable at rest.   CARDIOVASCULAR:  S1, S2 normal, no S3, 3/6 systolic murmur apex of heart.   RESPIRATORY:  Bilateral breaths, no rhonchi or crepitations.   ABDOMEN:  Bowel sounds present, nontender.   EXTREMITIES:  Warm, well-perfused, mild pedal edema.   NEUROLOGIC:  No focal deficits.   EYES:  Pupils reactive.   DERMATOLOGIC:  Within normal limits.   ENT:  Within normal limits.      LABORATORY DATA:  Electrolytes within normal limits, creatinine 1.52.  White count 7.5, hemoglobin 11.3, platelet count 151.  INR 1.36.        The patient underwent a right heart cath that showed cardiac index 2.3, pulmonary wedge pressure 18, PA pressure 46/24, right atrial pressure 11.  Outside echocardiogram shows severely increased LV size, LVEDP is 7.6, ejection fraction 20%, severe mitral regurgitation, moderate pulmonary hypertension, severely dilated right atrium.  On last angiogram no evidence of obstructive coronary disease in 2017.      ASSESSMENT AND PLAN:  A 61-year-old gentleman with severe ischemic cardiomyopathy with elevated filling pressures who was admitted for optimization.  I did discuss the risks and benefits of both LVAD placement and heart transplantation.  With his blood grouping and his large body size, the patient would quite likely need an LVAD as a bridge to transplantation.  I did discuss the risks and benefits of both LVAD and transplantation including risk of death, stroke, bleeding, wound infection, renal failure and arrhythmias.  We will have him meet with our LVAD coordinators to show him all pump options.  I will also discuss with the Heart Failure team.  If any questions regarding his care, feel free to contact me.         DIYA BECKER MD             D: 2017 16:41   T: 2017 22:06   MT:       Name:     LOIS FULLER   MRN:      [id number]        Account:       [id number]   :      1955           Consult Date:  2017      Document: [id number]       cc:  Rose Montgomery MD       [facility] Billing

## 2017-05-25 NOTE — PLAN OF CARE
"Problem: Goal Outcome Summary  Goal: Goal Outcome Summary  1. Pt will be hemodynamically stable   BP 98/64 (BP Location: Right arm)  Pulse 93  Temp 98.3  F (36.8  C) (Oral)  Resp 18  Ht 1.727 m (5' 8\")  Wt 120.4 kg (265 lb 6.4 oz)  SpO2 94%  BMI 40.35 kg/m2  Neuro: AO x 4  Cardiac: paced rhythm, VS and BSS  Respiratory: Nasal CPAP on during night shift, RA during day  GI/: Voiding well with good UOP, no nausea  Diet/appetite: Low Na diet, good appetite  Activity: Up independently  Pain: Denies  Skin: Intact, no new deficits noted  Lines: R PIV SL'ed  Drains: N/A  Plan: LVAD workup with neuropsych consult later this AM and VAD intro with coordinator     Will continue to monitor and notify physician of any pertinent changes.          "

## 2017-05-25 NOTE — PROGRESS NOTES
Patient A&O X 4. VSS. Denies any dizziness, lightheadedness. RESENDIZ. Pain in left elbow, pt thinks from leaning on it while in bed, lidoderm patch placed with some relief. Good UOP, stool sample sent to lab. US x 2 and CT angio done today. LVAD show and tell tomorrow at 1230 with Rajani LVAD coordinator. Neuropsychology also plan to see patient tomorrow afternoon. Will continue to monitor and notify team of any questions or concerns.

## 2017-05-25 NOTE — PROGRESS NOTES
Patient A&O X 4. VSS. Here for LVAD workup. Lidoderm patch on left elbow for pain with minimal affect. Denies any dizziness, lightheadedness, shortness of breath. Uses home CPAP when sleeping. Up ad francisco. Neuropsychology saw patient today, LVAD showing also done. Plan to have palliative team see patient tomorrow AM and then discharge afterwards.

## 2017-05-25 NOTE — CONSULTS
Patient seen in the hospital on patient care unit 6C for psychosocial assessment.   60 minutes spent with patient. 100% of visit consisted of counseling related to issues surrounding LVAD implant and heart transplant.    Psychosocial Assessment   Name: Jim Willingham     MRN:  [id number]        Patient Name / Age / Race: Jim Willingham 61 year old  and -American   Source of Information: Patient and Wife   : Clara Casiano   Interview Date: May 25, 2017   Reason for Referral: Heart Transplant and Mechanical Circulatory Support     Current Living Situation   Location ([facility]/State): [address] [place] [address]   With Whom: Living arrangements - the patient lives with their family; Wife, Step-Son, and 3 y/o Great Grand-Dtr.   Type of Home: single family; Split-level with 8-10 steps up and 4-5 steps down.   Working Phone? Yes    [employer] Pronged Outlet? Yes    Distance from Hospital: 30 minutes   Need to Relocate Post Surgery? No   Discussed? No     Vocational/Employment/Financial   Employment: Disabled   Occupation: Respiratory Therapist   Education: High School plus one year of respiratory certification   [place]? No   Income: SSD and spouse's income   Insurance: Private Insurance   Name of Private Insurance: [employer] through wife and Medicare A only   Medication Coverage: [employer]    In current situation, pt. can afford medication and supply costs:  Yes ; $10 copays for medications   Other Financial Concerns/Issues: None; other than wife missing work to be caregiver if proceeds with VAD     Family/Social Support   Marital Status:    Partner Name: Cate   Length of Time : 18 years   Partner s Employment: Respiratory Therapist for [employer]   Relationship: stable/supportive   Children: 2 Biological Children and 4 Step-Sons: Biological Children are Jim III who works nights and lives in [place]. Barbra Buckley also works fulltime and lives  in [place]. Is closer with Dtr. Oldest Step-son is Max who lives in [place]. Then, Vaughn (Tip), Ottoniel ([place]), and Faisal, the youngest who lives with the patient and wife. Good relationship with all of them.   Parents: Father healthy and independent, but lives in [place]. Is able to drive and will be here to help out during VAD. Very close with Father. They speak to each other every day.   Siblings: 2 Brothers (Eze and Shane) and 1 Sister. All live locally. He reports a good relationship with his siblings.   Other Family or Friends Close by: Reports having some good friends too.   Support System: available, helpful   Primary Support Person: Cate and one of her Sons.   Issues: None     Activities/Functional Ability   Current Level: ambulatory and independent with ADL's   Daily Activities: Very fatigued with low energy and shortness of breath.   Transportation: self      Medical Status   Patient s understanding of need for surgical intervention: Good   Advanced Directive? No    Details: Wife would be appropriate decision-maker. Provided patient and wife with blank HCD and provided brief education on document   Adherence History: No reported non-compliance   Ability to Adhere to Complex Medical Regime: Yes     Behavioral   Chemical Dependency Issues: No; Reports heavy ETOH use prior to 2010. Wife was alcoholic and patient drank with her. She entered CD treatment in 2010 and quit with her, but did not go through treatment. Has had 7-8 beers over the last 7 years. Does not drink anymore. Marijuana use reported in past, but none in last few years. No other illicit drug use reported   Smoker? No   Psychiatric impairment: No   Coping Style/Strategies: Strong Ivonne in God. Enjoys Golfing and riding his motorcycle, but hasn't done these things for a while. Wants to spend time with Great Grand-Dtr and play with her in the park. Seems to have good coping skills and a positive outlook.   Recent Legal History: No    Teaching Completed During Assessment Related To Transplant and Mechanical Circulatory Support: 1. Housing and relocation needs post surgery.  2. Caregiver needs post surgery.  3. Financial issues related to surgery.  4. Risks of alcohol use post surgery.  5. Common psychosocial stressors pre/post surgery.  6. Support group availability.   Psychosocial Risks Reviewed Related To Transplant and Mechanical Circulatory Support: 1. Increased stress related to your emotional, family, social, employment, or financial situation.  2. Affect on work and/or disability benefits.  3. Affect on future health and life insurance.  4. Outcome expectations may not be met.  5. Mental Health risks: anxiety, depression, PTSD, guilt, grief and chronic fatigue.     Observed Behavior   Well informed? Yes  Angry? No   Process info well? Yes  Hostile? No   Evasive? No Oriented X3? Yes    Cautious/Suspicious? No Motivated? Yes    Appropriate Behavior? Yes  Depressed? No   Appropriate Affect? Yes  Anxious? No   Interview Observations: Good eye contact and very engaged in the conversation. Asked good questions and seems incredibly motivated to continue to live his life so he can spend it with family.     Selection Criteria Met   Plan for Support Yes    Chemical Dependence Yes    Smoking Yes    Mental Health Yes    Adequate Finances Yes      Risk Issues:    None    Final Evaluation/Assessment:     Mr. Willingham was friendly and forthcoming with information. He seemed very engaged and motivated to feel better. His wife expressed feeling a little more reluctant as she lost her first  to heart failure. He ended up getting a heart transplant and  shortly after surgery. She expressed more anxiety and fear than the patient. They both attended support group on Thursday and got a lot of good information out of it. Mr. Willingham seems to have a good support system between his wife, step-sons, Brother, and friends, along with his Dad who would come  from [place]. He doesn't have any current issues with Chemical Dependency and does take Celexa for mood, but feels it is currently managed well and does not see a psychiatrist or counselor. He has adequate health insurance and doesn't report having any financial concerns other than when his wife would have to miss work to be his caregiver. Mr. Willingham seems accepting of VAD as a way to make it to heart transplant. At this point, there does not appear to be any psychosocial barriers that would preclude him from pursuing LVAD and/or heart transplant.    Patient understands risks and benefits of Heart Transplant and Mechanical Circulatory Support: Yes     Recommendation:    good    Signature: Clara Casiano     Title: Licensed Independent Clinical                Interview Date: May 25, 2017

## 2017-05-26 VITALS
WEIGHT: 258.5 LBS | TEMPERATURE: 98.7 F | HEIGHT: 68 IN | HEART RATE: 69 BPM | BODY MASS INDEX: 39.18 KG/M2 | RESPIRATION RATE: 18 BRPM | DIASTOLIC BLOOD PRESSURE: 62 MMHG | OXYGEN SATURATION: 94 % | SYSTOLIC BLOOD PRESSURE: 94 MMHG

## 2017-05-26 DIAGNOSIS — I50.22 CHRONIC SYSTOLIC CONGESTIVE HEART FAILURE (H): Primary | ICD-10-CM

## 2017-05-26 LAB
A* LOCUS: NORMAL
A*: NORMAL
ABTEST METHOD: NORMAL
ANION GAP SERPL CALCULATED.3IONS-SCNC: 9 MMOL/L (ref 3–14)
B* LOCUS: NORMAL
B*: NORMAL
BUN SERPL-MCNC: 41 MG/DL (ref 7–30)
BW-1: NORMAL
BW-2: NORMAL
C* LOCUS: NORMAL
C*: NORMAL
CALCIUM SERPL-MCNC: 8.8 MG/DL (ref 8.5–10.1)
CHLORIDE SERPL-SCNC: 102 MMOL/L (ref 94–109)
CO2 SERPL-SCNC: 26 MMOL/L (ref 20–32)
CREAT SERPL-MCNC: 1.44 MG/DL (ref 0.66–1.25)
DPA1*: NORMAL
DPB1*: NORMAL
DQA1*: NORMAL
DQA1*LOCUS: NORMAL
DQB1* LOCUS: NORMAL
DQB1*: NORMAL
DRB1* LOCUS: NORMAL
DRB1*: NORMAL
DRB3* LOCUS: NORMAL
DRB5*: NORMAL
DRSSO TEST METHOD: NORMAL
ERYTHROCYTE [DISTWIDTH] IN BLOOD BY AUTOMATED COUNT: 14 % (ref 10–15)
ERYTHROCYTE [SEDIMENTATION RATE] IN BLOOD BY WESTERGREN METHOD: 25 MM/H (ref 0–20)
GFR SERPL CREATININE-BSD FRML MDRD: 50 ML/MIN/1.7M2
GLUCOSE BLDC GLUCOMTR-MCNC: 104 MG/DL (ref 70–99)
GLUCOSE BLDC GLUCOMTR-MCNC: 163 MG/DL (ref 70–99)
GLUCOSE SERPL-MCNC: 122 MG/DL (ref 70–99)
HCT VFR BLD AUTO: 34.2 % (ref 40–53)
HGB BLD-MCNC: 11.1 G/DL (ref 13.3–17.7)
INR PPP: 1.21 (ref 0.86–1.14)
MAGNESIUM SERPL-MCNC: 1.9 MG/DL (ref 1.6–2.3)
MCH RBC QN AUTO: 30.4 PG (ref 26.5–33)
MCHC RBC AUTO-ENTMCNC: 32.5 G/DL (ref 31.5–36.5)
MCV RBC AUTO: 94 FL (ref 78–100)
PLATELET # BLD AUTO: 156 10E9/L (ref 150–450)
POTASSIUM SERPL-SCNC: 3.9 MMOL/L (ref 3.4–5.3)
RBC # BLD AUTO: 3.65 10E12/L (ref 4.4–5.9)
SODIUM SERPL-SCNC: 137 MMOL/L (ref 133–144)
URATE SERPL-MCNC: 9.5 MG/DL (ref 3.5–7.2)
WBC # BLD AUTO: 6.1 10E9/L (ref 4–11)

## 2017-05-26 PROCEDURE — 25000132 ZZH RX MED GY IP 250 OP 250 PS 637: Performed by: STUDENT IN AN ORGANIZED HEALTH CARE EDUCATION/TRAINING PROGRAM

## 2017-05-26 PROCEDURE — 00000146 ZZHCL STATISTIC GLUCOSE BY METER IP

## 2017-05-26 PROCEDURE — 99223 1ST HOSP IP/OBS HIGH 75: CPT | Performed by: CLINICAL NURSE SPECIALIST

## 2017-05-26 PROCEDURE — 25000131 ZZH RX MED GY IP 250 OP 636 PS 637: Performed by: INTERNAL MEDICINE

## 2017-05-26 PROCEDURE — 99238 HOSP IP/OBS DSCHRG MGMT 30/<: CPT | Mod: GC | Performed by: INTERNAL MEDICINE

## 2017-05-26 PROCEDURE — 36415 COLL VENOUS BLD VENIPUNCTURE: CPT | Performed by: INTERNAL MEDICINE

## 2017-05-26 PROCEDURE — 80048 BASIC METABOLIC PNL TOTAL CA: CPT | Performed by: INTERNAL MEDICINE

## 2017-05-26 PROCEDURE — 83735 ASSAY OF MAGNESIUM: CPT | Performed by: INTERNAL MEDICINE

## 2017-05-26 PROCEDURE — 25000132 ZZH RX MED GY IP 250 OP 250 PS 637: Performed by: INTERNAL MEDICINE

## 2017-05-26 PROCEDURE — 84550 ASSAY OF BLOOD/URIC ACID: CPT | Performed by: INTERNAL MEDICINE

## 2017-05-26 PROCEDURE — 99207 ZZC CDG-CORRECTLY CODED, REVIEWED AND AGREE: CPT | Performed by: CLINICAL NURSE SPECIALIST

## 2017-05-26 PROCEDURE — 25000125 ZZHC RX 250: Performed by: INTERNAL MEDICINE

## 2017-05-26 PROCEDURE — 40000275 ZZH STATISTIC RCP TIME EA 10 MIN

## 2017-05-26 PROCEDURE — 94640 AIRWAY INHALATION TREATMENT: CPT

## 2017-05-26 PROCEDURE — 85652 RBC SED RATE AUTOMATED: CPT | Performed by: INTERNAL MEDICINE

## 2017-05-26 PROCEDURE — 40000115 ZZH STATISTIC NURSE TLC VISIT: Performed by: CLINICAL NURSE SPECIALIST

## 2017-05-26 PROCEDURE — 85610 PROTHROMBIN TIME: CPT | Performed by: INTERNAL MEDICINE

## 2017-05-26 PROCEDURE — 85027 COMPLETE CBC AUTOMATED: CPT | Performed by: INTERNAL MEDICINE

## 2017-05-26 RX ORDER — WARFARIN SODIUM 10 MG/1
10 TABLET ORAL
Status: DISCONTINUED | OUTPATIENT
Start: 2017-05-26 | End: 2017-05-26 | Stop reason: HOSPADM

## 2017-05-26 RX ORDER — BUMETANIDE 2 MG/1
2 TABLET ORAL 2 TIMES DAILY
Qty: 60 TABLET | Refills: 0 | Status: ON HOLD | OUTPATIENT
Start: 2017-05-26 | End: 2017-06-13

## 2017-05-26 RX ORDER — TRAMADOL HYDROCHLORIDE 50 MG/1
50 TABLET ORAL EVERY 6 HOURS PRN
Status: DISCONTINUED | OUTPATIENT
Start: 2017-05-26 | End: 2017-05-26

## 2017-05-26 RX ORDER — TRAMADOL HYDROCHLORIDE 50 MG/1
50 TABLET ORAL EVERY 6 HOURS PRN
Status: DISCONTINUED | OUTPATIENT
Start: 2017-05-26 | End: 2017-05-26 | Stop reason: HOSPADM

## 2017-05-26 RX ORDER — ACETAMINOPHEN 325 MG/1
650 TABLET ORAL EVERY 6 HOURS
Status: DISCONTINUED | OUTPATIENT
Start: 2017-05-26 | End: 2017-05-26 | Stop reason: HOSPADM

## 2017-05-26 RX ADMIN — AMIODARONE HYDROCHLORIDE 100 MG: 100 TABLET ORAL at 08:12

## 2017-05-26 RX ADMIN — INSULIN ASPART 1 UNITS: 100 INJECTION, SOLUTION INTRAVENOUS; SUBCUTANEOUS at 13:28

## 2017-05-26 RX ADMIN — TRAMADOL HYDROCHLORIDE 50 MG: 50 TABLET, COATED ORAL at 08:10

## 2017-05-26 RX ADMIN — FLUTICASONE FUROATE AND VILANTEROL TRIFENATATE 1 PUFF: 200; 25 POWDER RESPIRATORY (INHALATION) at 08:12

## 2017-05-26 RX ADMIN — METOPROLOL SUCCINATE 100 MG: 100 TABLET, EXTENDED RELEASE ORAL at 08:11

## 2017-05-26 RX ADMIN — DICLOFENAC 2 G: 10 GEL TOPICAL at 13:28

## 2017-05-26 RX ADMIN — DIGOXIN 125 MCG: 125 TABLET ORAL at 08:12

## 2017-05-26 RX ADMIN — BUMETANIDE 2 MG: 2 TABLET ORAL at 08:12

## 2017-05-26 RX ADMIN — ACETAMINOPHEN 650 MG: 325 TABLET, FILM COATED ORAL at 05:44

## 2017-05-26 RX ADMIN — HYDRALAZINE HYDROCHLORIDE 10 MG: 10 TABLET, FILM COATED ORAL at 08:12

## 2017-05-26 RX ADMIN — HYDRALAZINE HYDROCHLORIDE 10 MG: 10 TABLET, FILM COATED ORAL at 13:37

## 2017-05-26 RX ADMIN — IPRATROPIUM BROMIDE AND ALBUTEROL SULFATE 3 ML: .5; 3 SOLUTION RESPIRATORY (INHALATION) at 05:59

## 2017-05-26 RX ADMIN — LOSARTAN POTASSIUM 25 MG: 25 TABLET, FILM COATED ORAL at 08:12

## 2017-05-26 RX ADMIN — CITALOPRAM HYDROBROMIDE 20 MG: 20 TABLET ORAL at 08:11

## 2017-05-26 RX ADMIN — ACETAMINOPHEN 650 MG: 325 TABLET, FILM COATED ORAL at 12:14

## 2017-05-26 RX ADMIN — SPIRONOLACTONE 25 MG: 25 TABLET ORAL at 08:12

## 2017-05-26 RX ADMIN — UMECLIDINIUM 1 PUFF: 62.5 AEROSOL, POWDER ORAL at 08:11

## 2017-05-26 ASSESSMENT — PAIN DESCRIPTION - DESCRIPTORS
DESCRIPTORS: ACHING

## 2017-05-26 NOTE — PROGRESS NOTES
DISCHARGE   Discharged to: Home  Via: Automobile  Accompanied by: brother  Discharge Instructions: principal diagnosis, major findings/procedures, diet, activity, medications, follow up appointments, when to call the MD, and what to watchout for (i.e. s/s of infection, increasing SOB, palpitations, Angina). Pt states understanding of all discharge instructions.   Prescriptions: To be filled by home pharmacy per pt's request; scripts faxed to pharmacy.  Follow Up Appointments: with Dr. Montgomery (cardiology) in 1 week  Belongings: All sent with pt. Pt verifies that they are taking all belongings with them.   IV: discontinued.   Telemetry: discontinued.   Pt exhibits understanding of above discharge instructions; all questions answered.  Discharge Paperwork: faxed to discharge planner.

## 2017-05-26 NOTE — PROGRESS NOTES
D/A/I/P: Patient A&O X 4. Pt VSS. Pt admitted for LVAD workup. Pt continues to report pain in left elbow and received Acetaminophen PO PRN x 2 overnight. Also, heat applied to the area. At 0600 pt requested Tramadol. Team paged and orders placed for this PRN to manage pain. Pt denies any dizziness, lightheadedness, shortness of breath. Pt does become SOB with activity. Pt  uses home CPAP overnight. Plan is for pt to see palliative team tomorrow AM and then discharge afterwards. RN will continue to monitor and assess. RN will update team with any changes/concerns. Liza Soto

## 2017-05-26 NOTE — CONSULTS
NEUROPSYCHOLOGICAL EVALUATION      DATE OF EVALUATION:  05/25/2017      RELEVANT HISTORY AND REASON FOR REFERRAL:  Mr. Jim Willingham is a 61-year-old -American man with a history of nonischemic cardiomyopathy and congestive heart failure, previously treated with biventricular pacemaker/ICD and medical therapies.  He now comes to medical attention with worsening heart failure and is being worked up for possible advanced treatments including LVAD and heart transplant.  Other health issues are diabetes, stage III renal disease, obstructive sleep apnea, and asthma/COPD.  Neuropsychological evaluation is requested by Dr. Delisa Montgomery, the treating cardiologist, on behalf of the LVAD/heart transplant teams, to assess mental health, cognition, and lifestyle issues, as it pertains to his suitability for these advanced treatments.      The oldest of four children, he was born in [place] and grew up there, primarily reared by his mother.  His father was in and out of the home but somewhat involved in his upbringing.  His father was a , construction hand, arsenol and , and lastly [employer] supervisor.  His mother was primarily a homemaker who sometimes took housecleaning jobs.  Mr. Willingham dropped out of school in the eleventh grade when he became a father, and later got a GED and finished a year long respiratory therapy program.  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  He stopped working for health reasons in early 2008, and is now supported by Social Security Disability.  His first marriage, at age 21, ended in divorce 17 years later.  He has a 43-year-old son and 41-year-old daughter from that union.  He has been with his second, current spouse for 18 years and has four grown stepsons from that relationship.  He and his wife provided foster care and then adopted their three-year-old great granddaughter.      BEHAVIORAL OBSERVATIONS  AND CLINICAL INTERVIEW FINDINGS:  I met Mr. Willingham in his semi-private room for the interview on 05/24/2017.  His wife was present and included in the interview, but was largely silent, knitted and occasionally nodding off.  Mr. Willingham presents as a gregarious, rotund middle-aged man with short cropped hair and a graying beard of several days' growth, clad in hospital attire.  Thoughts were conveyed clearly and logically, with a good command of the complex medical history.      The reader is referred to the cardiology notes for a full health summary.  The noncardiac history is noteworthy for diabetes first diagnosed in 1996.  That improved markedly after he underwent gastric bypass surgery in 2003, and lost 140 pounds (down from a peak weight of 367 pounds).  A1c's normalized.  He has since gained back about 40 pounds. Diabetes became more pronounced while he was on prednisone for COPD.  He is now off that medication.  In 1993 he was diagnosed with sleep apnea.  He has adjusted well to the CPAP and is using it religiously.  He also has hypertension, stage III chronic kidney disease, and possibly depression.      The neurological history is mentionable for a seemingly mild concussion as a youngster with good recovery.  Around age 14 he collided with another player during a hockey match and was briefly dazed or knocked out.  He went on to make a good recovery without medical treatment.  He has not had three weeks ago, perhaps in the context of dehydration, recovering quickly.     Several years ago he hurt his back at work.  The treating physician meant to prescribe Celebrex but instead he was accidentally given citalopram.  He felt better on it and has basically stayed on it ever since, prescribed by his primary care physician.  At the time, it mellowed him out and made him more tolerant of his vexing teenage stepson.  He has taken it off and on since then, and at one point felt teary after he was off it for a year, so  he is now back on it.  Otherwise, he has had no mental health treatment and is feeling well at this time.  He has a strong willa in God and believes everything will go as God intends it to.  He also gets a lot of bj out of his three-year-old great granddaughter, and she is one of the reasons he wants to embrace any medical treatment that will keep him alive to see her raising through.      Regarding habits, he was a light smoker, averaging less than a pack a day, sometimes quitting for up to six months at a time, before he quit around 1995.  He has not smoked since then.  Alcohol was heavy in the past.  For a number of years, he could drink up to 30 beers a day.  Use escalated during the second marriage, as his wife reportedly was alcoholic and he would try to keep up with her drinking.  When she sought treatment for alcohol dependency in 2010, he quit along with her, without treatment.  It never caused social, work, or legal problems, and apparently he developed quite a tolerance.  He never had trouble giving up alcohol, never experienced withdrawal symptoms, cravings, etc.  Since quitting in 2010, he has had only a rare beer, estimating 5-6 beers in the intervening seven years.  He also was a fairly heavy marijuana user in the past, starting his late teens, continuing through his mid-30s.  During that time he averaged a couple of joints a day, sometimes more on his days off.  Since his mid-30s, there has been some occasional marijuana use, but not in a long time.  He took acid once or twice and once snorted cocaine, but did not care for it.  He may have tried hallucinogenic mushrooms on a few occasions.  None of these drugs became regular habits.  Intravenous use of any kind was denied.      The cognitive testing portion of his evaluation was completed the next day, on 05/25/2017.  We were able to get a quiet conference room for the testing.  He was quite friendly and entirely cooperative, working hard on  everything.  Results are considered technically valid and an accurate reflection of current cognitive capabilities.      NEUROPSYCHOLOGICAL FINDINGS:  Select intellectual abilities were assessed with subtests from the Wechsler Adult Intelligence Scale-IV.  Visuospatial processing and constructional abilities (Block Design) were below average.  Speeded graphomotor learning (Coding) was low average.  Word knowledge and expressive communicability (Vocabulary) was a clear strength, in the superior range.      Immediate memory for two story passages from the Wechsler Memory Scale Revised was superior, with 30 of 50 story elements recalled immediately after presentation.  Retention of the stories 30 minutes later was above average if not superior.  Word list learning (Panfilo Auditory Verbal Learning Test) was low average for learning over trials, though 11 of the 15 words were learned by the last trial, which is solidly average.  Retention of the list immediately after presentation was average, and recall 30 minutes later was high average.  Fourteen of the 15 words were correctly selected presented in paragraph format; two additional words were incorrectly selected.  Immediate memory for figural material (four designs from the WMS) was below average.  Free recall 30 minutes later was nil, but when cues were provided most of the originally learned material was recalled, and two of the four figures were recognized when presented in multiple-choice format.  His copy drawings of three Gooden-Gestalt figures were slightly impaired due to size reductions, workovers, and simplifications.  All three figures were recalled immediately after presentation, with some additional distortion.      Performance on a simple numerical sequencing exercise (Trail Making Test, Part A), requiring sustained attention, was above average if not superior for speed and error free.  Performance on a more complex sequencing exercise (Trail Making Test,  Part B), requiring divided attention (alternating between number and letter sequences), was likewise above average for speed with one error.  Verbal associative fluency on the Controlled Oral Word Association Test (generating words beginning with target letters) was superior.  A variety of brief exercises requiring sustained attention and cognitive flexibility (Test of Sustained Attention and Tracking) were completed very quickly with just one error.      Finger tapping speeds were high average for the left (dominant) hand and above average for the right hand.  Single word reading, as measured by the Wide Range Achievement Test-4 was average, above the high school level.      He also completed the MMPI-2-RF, a self-report personality measure.  All but one item was answered, and results are considered valid.  Results reflect social nonconformity, with a history of juvenile conduct problems and anger proneness.  Full-blown sociopathy is not indicated, however, nor are there any signs of ongoing carolina disturbance or more severe forms of psychopathology.  He is not reporting much in the way of somatic symptoms aside from marked malaise, consistent with advanced cardiac disease.     CONCLUSIONS AND RECOMMENDATIONS:  This 61-year-old man with advanced cardiomyopathy is very limited by shortness of breath and dyspnea on exertion.  Other health issues are diabetes, asthma, CKD, chronic obstructive pulmonary/apnea, and morbid obesity despite gastric bypass.  He has been on citalopram off and on for many years, accidentally given to him when he was in fact prescribed Celebrex, but it's had a beneficial, mellowing effect so he's stayed on it, managed by his primary care physician.  Otherwise, he has not sought or needed other mental health treatment.  He was a heavy beer drinker in the past (up to 30 beers daily), quitting the habit in 2010 when his current wife went through alcohol treatment.  She has remained sober, and   Maulik has had a rare drink since then (he estimates five or six drinks in the last seven years).  He lives locally with his second wife and their three-year-old great granddaughter, who they adopted.      Testing reveals a few isolated cognitive weaknesses.  Visuospatial processing is below average and drawings are mildly impaired.  Short-term recall of figural material is at the bottom of the normal range, with poor spontaneous recall of the figures after a delay.  He recalls more with cues.  In contrast, language abilities are very well developed, with above average if not superior vocabulary and excellent short and long-term retention of story passages and a word list.  Executive abilities are above average, including divided attention and speeded word retrieval.  He is fast and generally accurate on short exercises requiring sustained and divided attention.  Finger tapping speeds are within normal limits for this left-handed man, and single word reading is above the high school level. The MMPI reflects social nonconformity and anger proneness, with juvenile conduct issues.  Malaise is evident, attributable to heart failure.  There are no indications of ongoing depression.     This test pattern raises the possibility of subtle nondominant hemisphere dysfunction, possibly lifelong, characterized by weaker nonverbal reasoning abilities and left-handedness.  There are certainly no indications of recently acquired brain disease including dementing conditions.      Mr. Willingham can certainly understand medical information, weigh options, and make well-reasoned decisions for himself.  He demonstrates an excellent command of the lengthy, complex medical history.  From a psychosocial standpoint, the only concern is morbid obesity, as he has obviously had a difficult time controlling food intake and is obese despite gastric bypass.  He is motivated to extend his life so he can be around to raise his three-year-old great  granddaughter, who he adores.        Gisela Whitehead Psy.D., LP  Diplomate in Clinical Neuropsychology/ABPP     DIAGNOSTIC IMPRESSION:  Cognitive disorder in the context of heart failure.  This evaluation included approximately 2 hours of testing administered by a psychometrist with interpretation by a neuropsychologist (CPT code 65772) and an additional 3 hours of professional time spent on the interview, data integration, record review, and report preparation (CPT code 14697).         PETE GROVE             D: 2017 15:47   T: 2017 19:47   MT: [place]      Name:     LOIS FULLER   MRN:      [id number]        Account:       [id number]   :      1955           Consult Date:  2017      Document: [id number]

## 2017-05-26 NOTE — PLAN OF CARE
Problem: Goal Outcome Summary  Goal: Goal Outcome Summary  1. Pt will be hemodynamically stable   Outcome: Adequate for Discharge Date Met:  05/26/17  D/A/I:  Patient A&O x4, denied palpitations, dizziness, and nausea.  Reported RESENDIZ, lung sounds clear to auscultation, heart sounds distant.  Had good urine output, see I&O flowsheet.  Also reported tingling in fingers from thoracic outlet syndrome; notified care team, diclofenac cream ordered and administered.  In paced rhythm with occasional PVCs, HR 70s, SBP 90s, SaO2 94% on room air.  Reported left elbow pain from bursitis that was unchanged by tramadol, but did report improvement in shoulder pain.  Encouraged patient to keep weight off left arm to allow elbow to rest.  Neuropsychological evaluation completed and picked up by Neuropsych staff.  Palliative consult completed during shift.  Ambulated short distances in room independently with steady gait.  Verbalized readiness for discharge, brother will drive patient home.     P:  Prepare for discharge.  Ensure patient understands follow-up cares and appointments.  Ensure patient understands and recognizes signs/symptoms that indicate a need for further medical consultation.

## 2017-05-30 ENCOUNTER — CARE COORDINATION (OUTPATIENT)
Dept: CARE COORDINATION | Facility: CLINIC | Age: 62
End: 2017-05-30

## 2017-05-30 LAB
BACTERIA SPEC CULT: NO GROWTH
MICRO REPORT STATUS: NORMAL
SPECIMEN SOURCE: NORMAL

## 2017-05-30 NOTE — PROGRESS NOTES
Patient has LVAD work up so they will be followed by Cardiology for Post DC follow up              Reason for Admission:   LVAD work up

## 2017-05-31 ENCOUNTER — ALLIED HEALTH/NURSE VISIT (OUTPATIENT)
Dept: PHARMACY | Facility: CLINIC | Age: 62
End: 2017-05-31
Payer: COMMERCIAL

## 2017-05-31 DIAGNOSIS — I50.42 CHRONIC COMBINED SYSTOLIC AND DIASTOLIC CONGESTIVE HEART FAILURE (H): ICD-10-CM

## 2017-05-31 DIAGNOSIS — M19.019 OSTEOARTHRITIS OF SHOULDER, UNSPECIFIED LATERALITY, UNSPECIFIED OSTEOARTHRITIS TYPE: ICD-10-CM

## 2017-05-31 DIAGNOSIS — I47.29 PAROXYSMAL VT (H): ICD-10-CM

## 2017-05-31 DIAGNOSIS — I42.8 NICM (NONISCHEMIC CARDIOMYOPATHY) (H): Primary | ICD-10-CM

## 2017-05-31 DIAGNOSIS — J45.909 UNCOMPLICATED ASTHMA, UNSPECIFIED ASTHMA SEVERITY: ICD-10-CM

## 2017-05-31 DIAGNOSIS — J44.9 CHRONIC OBSTRUCTIVE PULMONARY DISEASE, UNSPECIFIED COPD TYPE (H): ICD-10-CM

## 2017-05-31 PROCEDURE — 99605 MTMS BY PHARM NP 15 MIN: CPT | Mod: U4 | Performed by: PHARMACIST

## 2017-05-31 PROCEDURE — 99607 MTMS BY PHARM ADDL 15 MIN: CPT | Mod: U4 | Performed by: PHARMACIST

## 2017-05-31 NOTE — PROGRESS NOTES
SUBJECTIVE/OBJECTIVE:                Jim Willingham is a 61 year old male called for a transitions of care visit.  He was discharged from [facility] on 5/25/2017 for LVAD work up. PCP is Dr. Molina at [facility], cardiologist is Dr. Montgomery.    Chief Complaint: Med Review. Hospitalized for LVAD work up and heart transplant referral. Just got word he is to start Levequin today x10 days for bronchitis.    Allergies/ADRs: Reviewed in Epic  Tobacco: No tobacco use   Alcohol: none  Caffeine: rare cups of coffee  Activity: none d/t SOB  PMH: Reviewed in Epic    Medication Adherence: no issues reported    NICM/HFrEF/VT: Only change to medications during hospitalization was an increase to bumetanide 2mg to BID. He reports there was some fluid retention in his abdomen, which is improving - daily weight since d/c has decreased. He continues on metoprolol succinate 100mg BID, losartan 50mg 1/2 tab QD, hydralazine 10mg TID, spironolactone 25mg BID, amiodarone 200mg 1/2 tab QD, digoxin 125mcg QD, and warfarin 10mg M/W/F and 7.5mg all other days. INR was kept lower inpatient d/t procedures and he is back to PTA dose and is followed by his [facility]. He plans to have next INR drawn on Friday. Patient reports no current medication side effects.    Pt is complaining of sx of HF: shortness of breath, dyspnea on exertion and weight gain. He states SOB and RESENDIZ are his baseline, the fluid retention was addressed as above.  Pt is measuring daily weights and reports decreased to 256.2lbs over past 5 days.   Patient does not self-monitor BP.   Pt is following a low sodium diet, is avoiding EtOH.    Asthma/COPD:  Current medications: Albuterol MDI if needed when leaves the house for SOB and Albuterol+Ipratropium Nebs (Pt reports using albuterol Q4H during the day at home), Breo 200/25mcg 1 puff QD, Incruse 62.5mcg 1 puff QD, montelukast 10mg QD. Pt rinses their mouth after using steroid inhaler. Asthma triggers include: upper respiratory  infections, dust mites, pollens, mold, humidity and exercise or sports. Patient is a former respiratory therapist.  Pt reports the following symptoms: none. Some SOB d/t CHF  He received a call from his clinic during our visit instructing him to start a 10-day course of Levaquin for his current URI. He plans to start this later today. He has benzonatate on his med list, but states he hasn't needed this recently for coughing.    OA: Pt reports arthritis pain in his knees and bone-on-bone pain in his shoulder. He plans to have surgery on his shoulder eventually, but will likely be well after heart transplant. He currently manages his pain with Tramadol 50mg 2 tabs QAM and QHS. Feels this is somewhat helpful for pain. Will occasionally take an APAP 650mg. He avoids NSAIDs. Wonders if there is anything else that could help, but does not want to pursue narcotic pain meds.       Current labs include:  BP Readings from Last 3 Encounters:   05/26/17 94/62   05/12/17 98/63     Today's Vitals: There were no vitals taken for this visit. Phone visit.    Lab Results   Component Value Date    A1C 6.7 05/24/2017     No results found for: CHOL  No results found for: TRIG  No results found for: HDL  No results found for: LDL    Liver Function Studies -   Recent Labs   Lab Test  05/23/17   1157   PROTTOTAL  6.6*   ALBUMIN  3.5   BILITOTAL  1.1   ALKPHOS  72   AST  26   ALT  44       No results found for: UCRR, MICROL, UMALCR    Last Basic Metabolic Panel:  Lab Results   Component Value Date     05/26/2017      Lab Results   Component Value Date    POTASSIUM 3.9 05/26/2017     Lab Results   Component Value Date    CHLORIDE 102 05/26/2017     Lab Results   Component Value Date    BUN 41 05/26/2017     Lab Results   Component Value Date    CR 1.44 05/26/2017     GFR Estimate   Date Value Ref Range Status   05/26/2017 50 (L) >60 mL/min/1.7m2 Final     Comment:     Non  GFR Calc   05/25/2017 47 (L) >60 mL/min/1.7m2  Final     Comment:     Non  GFR Calc   05/24/2017 51 (L) >60 mL/min/1.7m2 Final     Comment:     Non  GFR Calc     GFR Estimate If Black   Date Value Ref Range Status   05/26/2017 61 >60 mL/min/1.7m2 Final     Comment:      GFR Calc   05/25/2017 57 (L) >60 mL/min/1.7m2 Final     Comment:      GFR Calc   05/24/2017 62 >60 mL/min/1.7m2 Final     Comment:      GFR Calc     TSH   Date Value Ref Range Status   05/24/2017 1.65 0.40 - 4.00 mU/L Final   ]      There is no immunization history on file for this patient.    ASSESSMENT:                 Current medications were reviewed today.      Medication Adherence: no issues identified    NICM/HFrEF/VT: Stable. Patient had some questions about the MOA of heart meds.    Asthma/COPD: Stable per patient.    Pain: Needs improvement. Patient may benefit from trial of topic OTC pain patches - Aspercreme + Lidocaine or capsaicin or could even try Icy Hot TENS unit on shoulder.       PLAN:                Post Discharge Medication Reconciliation Status: discharge medications reconciled, continue medications without change.    1. Reviewed mechanism of action, appropriate use, benefits, risks and monitoring of heart medications at length.  2. Pt will consider trial of Aspercreme+Lidocaine patch or capsaicin patch or Icy Hot Tens unit for shoulder pain.    I spent 60 minutes with this patient today (an extra 15 minutes was spent creating the Medication Action Plan). A copy of the visit note was provided to the patient's primary care provider.     Will follow up as needed.    The patient declined a summary of these recommendations as an after visit summary.    Yudi Fernando, Pharm.D., [facility]  Medication Therapy Management Pharmacist  [phone]

## 2017-05-31 NOTE — MR AVS SNAPSHOT
After Visit Summary   5/31/2017    Jim Willingham    MRN: [id number]           Patient Information     Date Of Birth          1955        Visit Information        Provider Department      5/31/2017 3:00 PM Yudi Fernando [facility]        Today's Diagnoses     NICM (nonischemic cardiomyopathy) (H)/ EF 20%    -  1    Chronic combined systolic and diastolic congestive heart failure (H)        Paroxysmal VT (H)        Uncomplicated asthma, unspecified asthma severity        Chronic obstructive pulmonary disease, unspecified COPD type (H)        Osteoarthritis of shoulder, unspecified laterality, unspecified osteoarthritis type           Follow-ups after your visit        Your next 10 appointments already scheduled     Jun 01, 2017  8:30 AM CDT   FULL PULMONARY FUNCTION with UC PFL C   [facility] Pulmonary Function Testing ([facility])    [address] [place] [id number] [phone]            Jun 01, 2017  9:45 AM CDT   Lab with UC LAB    [facility] ([facility])    [address] [place] [id number]   [phone]            Jun 01, 2017 10:30 AM CDT   Card Cardpul Stress Tst Adult with [facility]   [facility] ([facility]    [address] [facility] [id number]               Jun 01, 2017  1:00 PM CDT   Six Minute Walk with UC PFL 6 MINUTE WALK 1   [facility] Pulmonary Function Testing ([facility])    [address] [place] [id number] [phone]              Who to contact     If you have questions or need follow up information about today's clinic visit or your schedule please contact [facility] directly at [phone].  Normal or non-critical lab and imaging results will be communicated to you by MyChart, letter or phone within 4  "business days after the clinic has received the results. If you do not hear from us within 7 days, please contact the clinic through Meican or phone. If you have a critical or abnormal lab result, we will notify you by phone as soon as possible.  Submit refill requests through Meican or call your pharmacy and they will forward the refill request to us. Please allow 3 business days for your refill to be completed.          Additional Information About Your Visit        Meican Information     Meican lets you send messages to your doctor, view your test results, renew your prescriptions, schedule appointments and more. To sign up, go to [email or web address] . Click on \"Log in\" on the left side of the screen, which will take you to the Welcome page. Then click on \"Sign up Now\" on the right side of the page.     You will be asked to enter the access code listed below, as well as some personal information. Please follow the directions to create your username and password.     Your access code is: [email or web address]  Expires: 2017  6:30 AM     Your access code will  in 90 days. If you need help or a new code, please call your [facility] clinic or [phone].        Care EveryWhere ID     This is your Care EveryWhere ID. This could be used by other organizations to access your [facility] medical records  [id number]         Blood Pressure from Last 3 Encounters:   17 94/62   17 98/63    Weight from Last 3 Encounters:   17 258 lb 8 oz (117.3 kg)   17 262 lb 4.8 oz (119 kg)              Today, you had the following     No orders found for display       Primary Care Provider    Physician No Ref-Primary       No address on file        Thank you!     Thank you for choosing [facility]  for your care. Our goal is always to provide you with excellent care. Hearing back from our patients is one way we can continue to improve our services. Please take a few minutes to " complete the written survey that you may receive in the mail after your visit with us. Thank you!             Your Updated Medication List - Protect others around you: Learn how to safely use, store and throw away your medicines at www.disposemymeds.org.          This list is accurate as of: 5/31/17  4:10 PM.  Always use your most recent med list.                   Brand Name Dispense Instructions for use    albuterol 108 (90 BASE) MCG/ACT Inhaler   Generic drug:  albuterol      Inhale 2 puffs into the lungs every 4 hours as needed for shortness of breath / dyspnea or wheezing       amiodarone 200 MG tablet    PACERONE/CODARONE     Take 100 mg by mouth daily       BENADRYL 25 MG capsule   Generic drug:  diphenhydrAMINE      Take 25 mg by mouth nightly as needed for itching or allergies       BREO ELLIPTA 200-25 MCG/INH oral inhaler   Generic drug:  fluticasone-vilanterol      Inhale 1 puff into the lungs daily       bumetanide 2 MG tablet    BUMEX    60 tablet    Take 1 tablet (2 mg) by mouth 2 times daily       celeXA 20 MG tablet   Generic drug:  citalopram      Take 20 mg by mouth daily       cyanocobalamin 1000 MCG/ML injection    VITAMIN B12     Inject 1,000 mcg into the muscle every 30 days       diclofenac 1 % Gel topical gel    VOLTAREN    100 g    Apply 2 g topically 4 times daily as needed for moderate pain       DIGOX 125 MCG tablet   Generic drug:  digoxin      Take 125 mcg by mouth daily       GLUCOPHAGE 1000 MG tablet   Generic drug:  metFORMIN      Take 1,000 mg by mouth daily (with dinner)       hydrALAZINE 10 MG tablet    APRESOLINE    120 tablet    Take 1 tablet (10 mg) by mouth 3 times daily       INCRUSE ELLIPTA 62.5 MCG/INH oral inhaler   Generic drug:  umeclidinium      Inhale 1 puff into the lungs daily       ipratropium - albuterol 0.5 mg/2.5 mg/3 mL 0.5-2.5 (3) MG/3ML neb solution    DUONEB     Take 3 mLs by nebulization every 4 hours as needed for shortness of breath / dyspnea or wheezing        losartan 50 MG tablet    COZAAR    30 tablet    Take 0.5 tablets (25 mg) by mouth 2 times daily       metoprolol 100 MG 24 hr tablet    TOPROL XL    60 tablet    Take 1 tablet (100 mg) by mouth 2 times daily       SINGULAIR 10 MG tablet   Generic drug:  montelukast      Take 10 mg by mouth At Bedtime       spironolactone 25 MG tablet    ALDACTONE     Take 25 mg by mouth 2 times daily       TESSALON PERLES 100 MG capsule   Generic drug:  benzonatate      Take 200 mg by mouth 3 times daily as needed for cough       traMADol 50 MG tablet    ULTRAM     Take 100 mg by mouth every 6 hours as needed for moderate pain       warfarin 5 MG tablet    COUMADIN     Take 5 mg by mouth See Admin Instructions 10 mg M,W,F, 7.5 mg all other days       zinc sulfate 220 (50 ZN) MG capsule    ZINCATE     Take 220 mg by mouth 2 times daily

## 2017-06-05 ENCOUNTER — PRE VISIT (OUTPATIENT)
Dept: CARDIOLOGY | Facility: CLINIC | Age: 62
End: 2017-06-05

## 2017-06-05 DIAGNOSIS — I50.42 CHRONIC COMBINED SYSTOLIC AND DIASTOLIC CONGESTIVE HEART FAILURE (H): Primary | ICD-10-CM

## 2017-06-05 DIAGNOSIS — I48.0 PAROXYSMAL ATRIAL FIBRILLATION (H): ICD-10-CM

## 2017-06-06 LAB
PROTOCOL CUTOFF: NORMAL
SA1 CELL: NORMAL
SA1 COMMENTS: NORMAL
SA1 HI RISK ABY: NORMAL
SA1 MOD RISK ABY: NORMAL
SA1 TEST METHOD: NORMAL
SA2 CELL: NORMAL
SA2 COMMENTS: NORMAL
SA2 HI RISK ABY UA: NORMAL
SA2 MOD RISK ABY: NORMAL
SA2 TEST METHOD: NORMAL
UNACCEPTABLE ANTIGEN: NORMAL

## 2017-06-08 ENCOUNTER — OFFICE VISIT (OUTPATIENT)
Dept: CARDIOLOGY | Facility: CLINIC | Age: 62
End: 2017-06-08
Attending: INTERNAL MEDICINE
Payer: COMMERCIAL

## 2017-06-08 VITALS
SYSTOLIC BLOOD PRESSURE: 94 MMHG | DIASTOLIC BLOOD PRESSURE: 60 MMHG | HEART RATE: 72 BPM | WEIGHT: 255.9 LBS | BODY MASS INDEX: 38.78 KG/M2 | OXYGEN SATURATION: 97 % | HEIGHT: 68 IN

## 2017-06-08 DIAGNOSIS — I48.0 PAROXYSMAL ATRIAL FIBRILLATION (H): ICD-10-CM

## 2017-06-08 DIAGNOSIS — I50.22 CHRONIC SYSTOLIC CONGESTIVE HEART FAILURE (H): ICD-10-CM

## 2017-06-08 DIAGNOSIS — I50.42 CHRONIC COMBINED SYSTOLIC AND DIASTOLIC CONGESTIVE HEART FAILURE (H): ICD-10-CM

## 2017-06-08 DIAGNOSIS — K59.01 SLOW TRANSIT CONSTIPATION: Primary | ICD-10-CM

## 2017-06-08 LAB
6 MIN WALK (FT): 850 FT
6 MIN WALK (M): 259 M
ANION GAP SERPL CALCULATED.3IONS-SCNC: 11 MMOL/L (ref 3–14)
BUN SERPL-MCNC: 50 MG/DL (ref 7–30)
CALCIUM SERPL-MCNC: 9.1 MG/DL (ref 8.5–10.1)
CHLORIDE SERPL-SCNC: 99 MMOL/L (ref 94–109)
CHOLEST SERPL-MCNC: 173 MG/DL
CO2 SERPL-SCNC: 27 MMOL/L (ref 20–32)
CREAT SERPL-MCNC: 2.36 MG/DL (ref 0.66–1.25)
GFR SERPL CREATININE-BSD FRML MDRD: 28 ML/MIN/1.7M2
GLUCOSE SERPL-MCNC: 88 MG/DL (ref 70–99)
HDLC SERPL-MCNC: 49 MG/DL
HGB FREE PLAS-MCNC: <30 MG/DL
INR PPP: 4.2 (ref 0.86–1.14)
LDLC SERPL CALC-MCNC: 99 MG/DL
NONHDLC SERPL-MCNC: 124 MG/DL
POTASSIUM SERPL-SCNC: 4.2 MMOL/L (ref 3.4–5.3)
PREALB SERPL IA-MCNC: 27 MG/DL (ref 15–45)
PSA SERPL-ACNC: 0.24 UG/L (ref 0–4)
SODIUM SERPL-SCNC: 137 MMOL/L (ref 133–144)
TRIGL SERPL-MCNC: 124 MG/DL

## 2017-06-08 PROCEDURE — 00000401 ZZHCL STATISTIC THROMBIN TIME NC: Performed by: INTERNAL MEDICINE

## 2017-06-08 PROCEDURE — 85597 PHOSPHOLIPID PLTLT NEUTRALIZ: CPT | Performed by: INTERNAL MEDICINE

## 2017-06-08 PROCEDURE — 84134 ASSAY OF PREALBUMIN: CPT | Performed by: INTERNAL MEDICINE

## 2017-06-08 PROCEDURE — G0103 PSA SCREENING: HCPCS | Performed by: INTERNAL MEDICINE

## 2017-06-08 PROCEDURE — 80061 LIPID PANEL: CPT | Performed by: INTERNAL MEDICINE

## 2017-06-08 PROCEDURE — 99214 OFFICE O/P EST MOD 30 MIN: CPT | Mod: GC | Performed by: INTERNAL MEDICINE

## 2017-06-08 PROCEDURE — 81240 F2 GENE: CPT | Performed by: INTERNAL MEDICINE

## 2017-06-08 PROCEDURE — 80048 BASIC METABOLIC PNL TOTAL CA: CPT | Performed by: INTERNAL MEDICINE

## 2017-06-08 PROCEDURE — 99212 OFFICE O/P EST SF 10 MIN: CPT

## 2017-06-08 PROCEDURE — 85730 THROMBOPLASTIN TIME PARTIAL: CPT | Performed by: INTERNAL MEDICINE

## 2017-06-08 PROCEDURE — 85613 RUSSELL VIPER VENOM DILUTED: CPT | Performed by: INTERNAL MEDICINE

## 2017-06-08 PROCEDURE — 81241 F5 GENE: CPT | Performed by: INTERNAL MEDICINE

## 2017-06-08 PROCEDURE — 85732 THROMBOPLASTIN TIME PARTIAL: CPT | Performed by: INTERNAL MEDICINE

## 2017-06-08 PROCEDURE — 83051 HEMOGLOBIN PLASMA: CPT | Performed by: INTERNAL MEDICINE

## 2017-06-08 PROCEDURE — 85610 PROTHROMBIN TIME: CPT | Performed by: INTERNAL MEDICINE

## 2017-06-08 PROCEDURE — 36415 COLL VENOUS BLD VENIPUNCTURE: CPT | Performed by: INTERNAL MEDICINE

## 2017-06-08 RX ORDER — DOCUSATE SODIUM 100 MG/1
100 CAPSULE, LIQUID FILLED ORAL 2 TIMES DAILY
Qty: 60 CAPSULE | Refills: 1 | Status: ON HOLD | OUTPATIENT
Start: 2017-06-08 | End: 2017-06-30

## 2017-06-08 RX ORDER — SENNOSIDES 8.6 MG
1 TABLET ORAL DAILY
Qty: 120 EACH | Refills: 1 | Status: ON HOLD | OUTPATIENT
Start: 2017-06-08 | End: 2017-06-30

## 2017-06-08 ASSESSMENT — PAIN SCALES - GENERAL: PAINLEVEL: NO PAIN (0)

## 2017-06-08 NOTE — LETTER
6/8/2017      RE: Jim Willingham  [address] [place] [id number]       Dear Colleague,    Thank you for the opportunity to participate in the care of your patient, Jim Willingham, at the [facility]. Please see a copy of my visit note below.    June 8, 2017    Dear Buddy,     I had the pleasure of seeing Jim Willingham in the [facility] on June 8, 2017. As you know he is a pleasant 62 year old male with a long standing history of non- ischemic cardiomyopathy who has been declining, particularly over the last year.     His last measured ejection fraction was in the 20 % percent range with a dilated LV (7.7 cm) and severe MR. He was just hospitalized last month for diuresis and expedited LVAD work up. He overall feels that he has no energy and is severely fatigued to do the things he wants to do, it is getting harder to watch his granddaughter (who they just adopted) and he is the primary care-giver. He has had one LOC earlier this year which was not thought to be  VT. Although he is fatigued, he reports that his SOB has improved dramatically after diuresis on last admission, he is not orthopneic, no PND, able to ambulate 1-2 blocks without SOB, no palpitations, has lightheadedness, but no syncope since his last event several months ago which was not thought to be due to VT.    He still needs to finish his PFTs, 6 min walk test, and CPX next week.    He has been on excellent medical therapy for some and has had a BiV pacer since 2008. He denies recent palpitations and has had 3 ICD shocks since his ICD was placed.       PAST MEDICAL HISTORY:  Past Medical History:   Diagnosis Date     Benign essential hypertension 5/11/2017     Cardiomyopathy, unspecified (H) 5/8/2017     CKD (chronic kidney disease) stage 3, GFR 30-59 ml/min 5/11/2017     Depression 5/11/2017     Diabetes mellitus (H) 5/11/2017     H/O  gastric bypass 5/11/2017     ICD (implantable cardioverter-defibrillator), biventricular, in situ 5/11/2017     NICM (nonischemic cardiomyopathy) (H)/ EF 20% 5/11/2017    ECHO: LVEDd. 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 They have several children (from separate partners) and they have just adopted a granddaughter who he is the primary care giver.     He stopped ETOH in  2010 and would drink socially before that but never had any life difficulties related to alcohol abuse.     He stopped smoking in 1994. No ilicits.     Family history: there is no family history of premature MI or SCD, or CHF      CURRENT MEDICATIONS:  Current Outpatient Prescriptions   Medication Sig Dispense Refill     diclofenac (VOLTAREN) 1 % GEL topical gel Apply 2 g topically 4 times daily as needed for moderate pain 100 g 0     bumetanide (BUMEX) 2 MG tablet Take 1 tablet (2 mg) by mouth 2 times daily 60 tablet 0     metoprolol (TOPROL XL) 100 MG 24 hr tablet Take 1 tablet (100 mg) by mouth 2 times daily 60 tablet 5     losartan (COZAAR) 50 MG tablet Take 0.5 tablets (25 mg) by mouth 2 times daily 30 tablet 1     hydrALAZINE (APRESOLINE) 10 MG tablet Take 1 tablet (10 mg) by mouth 3 times daily 120 tablet 1     benzonatate (TESSALON PERLES) 100 MG capsule Take 200 mg by mouth 3 times daily as needed for cough       albuterol (ALBUTEROL) 108 (90 BASE) MCG/ACT Inhaler Inhale 2 puffs into the lungs every 4 hours as needed for shortness of breath / dyspnea or wheezing       ipratropium - albuterol 0.5 mg/2.5 mg/3 mL (DUONEB) 0.5-2.5 (3) MG/3ML neb solution Take 3 mLs by nebulization every 4 hours as  "needed for shortness of breath / dyspnea or wheezing       amiodarone (PACERONE/CODARONE) 200 MG tablet Take 100 mg by mouth daily       citalopram (CELEXA) 20 MG tablet Take 20 mg by mouth daily       cyanocobalamin (VITAMIN B12) 1000 MCG/ML injection Inject 1,000 mcg into the muscle every 30 days       digoxin (DIGOX) 125 MCG tablet Take 125 mcg by mouth daily       diphenhydrAMINE (BENADRYL) 25 MG capsule Take 25 mg by mouth nightly as needed for itching or allergies       fluticasone-vilanterol (BREO ELLIPTA) 200-25 MCG/INH oral inhaler Inhale 1 puff into the lungs daily       metFORMIN (GLUCOPHAGE) 1000 MG tablet Take 1,000 mg by mouth daily (with dinner)       montelukast (SINGULAIR) 10 MG tablet Take 10 mg by mouth At Bedtime       spironolactone (ALDACTONE) 25 MG tablet Take 25 mg by mouth 2 times daily       traMADol (ULTRAM) 50 MG tablet Take 100 mg by mouth every 6 hours as needed for moderate pain       umeclidinium (INCRUSE ELLIPTA) 62.5 MCG/INH oral inhaler Inhale 1 puff into the lungs daily       warfarin (COUMADIN) 5 MG tablet Take 5 mg by mouth See Admin Instructions 10 mg M,W,F, 7.5 mg all other days       zinc sulfate (ZINCATE) 220 (50 ZN) MG capsule Take 220 mg by mouth 2 times daily         ROS:   Constitutional: No fever, chills, or sweats. Weight is stable at present  ENT: No visual disturbance, ear ache, epistaxis, sore throat.   Allergies/Immunologic: Negative.   Respiratory: No cough, hemoptysis.   Cardiovascular: As per HPI.   GI: No nausea, vomiting, hematemesis, melena, or hematochezia.   : No urinary frequency, dysuria, or hematuria.   Integument: Negative.   Psychiatric: worried about the state of his health    Neuro: Negative.   Endocrinology: Negative.   Musculoskeletal: Negative.    EXAM:  Ht 1.727 m (5' 8\")  Wt 116.1 kg (255 lb 14.4 oz)  BMI 38.91 kg/m2  General: appears comfortable, alert and articulate, obese   Head: normocephalic, atraumatic  Eyes: anicteric sclera, " EOMI  Neck: no adenopathy  Orophyarynx: moist mucosa, no lesions, dentition intact  Heart: PMI diffuse, regular, S1/S2, no murmur, gallop, rub, estimated JVP 10 cm , no edema  Lungs: clear, no rales or wheezing  Abdomen: soft, non-tender, bowel sounds present, no hepatosplenomegaly   Neurological: normal speech and affect, no gross motor deficits      Last coronary angiogram and RHC May 10, 2017       Pressures      Phase:Baseline      AO :  70.00 mmHg / 49.00 mmHg ( 57.00 mmHg )  @ 10:39:59 AM            72.00 mmHg / 49.00 mmHg ( 58.00 mmHg )  @ 10:40:10 AM      LV :  83.00 mmHg / 16.00 mmHg / 23.00 mmHg  @ 10:46:50 AM      LV Pull back :  85.00 mmHg / 15.00 mmHg / 23.00 mmHg  @ 10:46:59 AM      AO Pull back :  86.00 mmHg / 51.00 mmHg ( 74.00 mmHg )  @ 10:47:06 AM      RA :  a wave = v wave = mean = 15.00 mmHg  @ 11:06:43 AM      RV :  44.00 mmHg / 15.00 mmHg / 18.00 mmHg  @ 11:06:27 AM      PA :  41.00 mmHg / 20.00 mmHg ( 32.00 mmHg )  @ 10:59:05 AM      PCW :  a wave = v wave = mean = 32.00 mmHg  @ 11:00:00 AM    Valves      Phase:Baseline      AV :  0.00 mmHg  @ 10:22:09 AM      AV Mean Gradient:  13.70 mmHg  @ 10:22:09 AM      AV Valve Area:  1.27 cm2  @ 10:22:09 AM      Cardiac Output      Phase:Baseline      Thermo Cardiac Output:  4.77 l/min  @ 10:22:09 AM    Flow      Phase:Baseline      Qp :  4.77 l/min  @ 10:22:09 AM      Qs :  4.77 l/min  @ 10:22:09 AM         Status       Last echocardiogram:    Severely increased left ventricular size. LVEDd is 7.66 cm   2. LV function is severely reduced. The visually estimated ejection fraction is 20%.   3. Septal motion consistent with conduction delay.   4. Restrictive pattern of diastolic filling consistent with severe diastolic dysfunction.   5. Severely dilated left atrium.   6. Severe mitral valve regurgitation.   7. Moderate pulmonary hypertension. PAP 48 mmHG plus RAP, or approximately 55-58 mmHG.   8. Compared to prior study (1/26/2016); The EF remains  severely depressed at 20%. Mitral regurgitation has increased from moderate to severe, likely due to the patient's cardiomyopathy. PAP has increased from 22 mmHG plus RAP to 48 mmHG feliz RAP.   9. The rhythm is normal sinus.  ualized. Trace pulmonic valve regurgitation.    LVIDd (2D):     7.66 cm (3.4-5.7) LA Major diam,s, A2c 7.5 cm      Pulmonary function tests:     The Patient reportedly appeared to give maximal effort.  The PFT   testing was performed on calibrated equipment and recorded in   compliance with the ATS/ERS Task Force Standardization of Lung   Function Testing. The PFT testing was performed in the sitting   position.    After acceptable spirograms had been obtained, the following   values were obtained and/or calculated:    Pre bronchodilator Spirometry:   SVC 2.35 L (61 %)  DLCO unc 16.89 ml/min/mmHg (59 %)  DLCOcor 17.57 ml/min/mmHg (62 %)  HGB 13.3 gm/dL    INTERPRETATION:    Slow vital capacity (SVC) is moderately reduced.    The DLCO is corrected for hemoglobin and is moderately reduced.    An isolated reduction in DLCO with normal lung volumes is seen in   patients with anemia, chronic pulmonary embolism, primary   pulmonary hypertension, carboxyhemoglobinemia, vasculitis or   early interstitial lung disease.      ECG: (per north) V pacing rate 90     Labs:    Lab Results   Component Value Date    WBC 6.1 05/26/2017    HGB 11.1 (L) 05/26/2017    HCT 34.2 (L) 05/26/2017     05/26/2017     06/08/2017    POTASSIUM 4.2 06/08/2017    CHLORIDE 99 06/08/2017    CO2 27 06/08/2017    BUN 50 (H) 06/08/2017    CR 2.36 (H) 06/08/2017    GLC 88 06/08/2017    SED 25 (H) 05/26/2017    NTBNPI 1832 (H) 05/23/2017    NTBNP 2502 (H) 05/12/2017    AST 26 05/23/2017    ALT 44 05/23/2017    ALKPHOS 72 05/23/2017    BILITOTAL 1.1 05/23/2017    INR 4.20 (H) 06/08/2017       Assessment and Plan:   In summary this is a very pleasant 62 M with a history of NICM which has been long standing who is referred  for consideration of advanced therapies. 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 He has decreasing neurohormonal blockade tolerance, he has several ER visits and admissions for heart failure, his LV is close to 8 cm, his renal function has been slowly worsening over time, he had a troponin elevation during his last admission, and his functional status is poor (class IIIB- IV)  despite diuresis and medical optimization. His cardiac output was also low on his right heart catheterization. I do not think the mitral clip is an option in his case and it would not fix the underlying myocardial problem.     We are planning on moving ahead with LVAD in his case  He has blood group O (longest wait for cardiac transplant) and his BMI is likely going to be prohibitive for a direct to transplant approach in the time frame that he is going to need advanced therapies (our cut of is less than BMI 37). We will have an eye towards listing him when he recovers from LVAD.    Given his worsened renal function today I am holding his evening diuretics, digoxin, aldactone, and ARB. We are repeating labs tomorrow. If his renal function is trending in the right direction through the weekend we may be able to wait until the end of the month for LVAD. If not, I will bring him in next week for implantation.     We also talked today about the PREVENT II study which he would qualify for.       1. Chronic systolic heart failure secondary NICM EF 20%  Stage D  NYHA Class IIIB  ACEi/ARB yes - holding today   BB yes   Aldosterone antagonist - on hold   SCD prophylaxis yes  % BiV pacing: pending for today  Fluid status mildly hypervolemic, continue bumex 2mg bid  NSAID use: contraindicated    He still needs to complete his PFTs, 6 min walk, and CPX.    Other:   VT: on low dose amio- controlled   afteroload reduction: on hydralzine 10 mg TID  NICM: on dig  125 mcg  paroxysmal a fib: on coumadin- this will need to be held pre-VAD    History of asthma/COPD: will need repeat PFTs as above although last tests were not prohibitive for LVAD/transplant and he is not on oxygen     Jeffrey Gibson  Cardiovascular fellow  [phone]    I have seen and examined the patient with the house staff on June 8, 2017 and agree with the outlined assessment and plan.    Delisa Montgomery MD   of Medicine   [facility] Division of Cardiology         CC    Chase Molina MD   [facility]    Please do not hesitate to contact me if you have any questions/concerns.     Sincerely,     Delisa Montgomery MD

## 2017-06-08 NOTE — MR AVS SNAPSHOT
After Visit Summary   6/8/2017    Jim Willingham    MRN: [id number]           Patient Information     Date Of Birth          1955        Visit Information        Provider Department      6/8/2017 10:00 AM Delisa Montgomery MD [facility]        Today's Diagnoses     Slow transit constipation    -  1      Care Instructions    We will be in touch with you about the June 26 th date.     Start colace and senna for constipation.     Rajani will call you about a nurse practitioner visit in the next few weeks.                           Follow-ups after your visit        Your next 10 appointments already scheduled     Jun 13, 2017  8:30 AM CDT   Card Cardpul Stress Tst Adult with UUEKGS   [facility] ([facility] [place])    [address] [address]               Jun 13, 2017 11:00 AM CDT   FULL PULMONARY FUNCTION with  PFL D   [facility] Pulmonary Function Testing ([facility])    [address]  3rd Floor  [place] [address] [phone]              Who to contact     If you have questions or need follow up information about today's clinic visit or your schedule please contact [facility] directly at [phone].  Normal or non-critical lab and imaging results will be communicated to you by MyChart, letter or phone within 4 business days after the clinic has received the results. If you do not hear from us within 7 days, please contact the clinic through [employer] or phone. If you have a critical or abnormal lab result, we will notify you by phone as soon as possible.  Submit refill requests through [employer] or call your pharmacy and they will forward the refill request to us. Please allow 3 business days for your refill to be completed.          Additional Information About Your Visit        [employer] Information     [employer] lets you send messages to your doctor, view your test results,  "renew your prescriptions, schedule appointments and more. To sign up, go to [email or web address] . Click on \"Log in\" on the left side of the screen, which will take you to the Welcome page. Then click on \"Sign up Now\" on the right side of the page.     You will be asked to enter the access code listed below, as well as some personal information. Please follow the directions to create your username and password.     Your access code is: [email or web address]  Expires: 2017  6:30 AM     Your access code will  in 90 days. If you need help or a new code, please call your [place] clinic or [phone].        Care EveryWhere ID     This is your Care EveryWhere ID. This could be used by other organizations to access your [place] medical records  [id number]        Your Vitals Were     Pulse Height Pulse Oximetry BMI (Body Mass Index)          72 1.727 m (5' 8\") 97% 38.91 kg/m2         Blood Pressure from Last 3 Encounters:   17 94/60   17 94/62   17 98/63    Weight from Last 3 Encounters:   17 116.1 kg (255 lb 14.4 oz)   17 117.3 kg (258 lb 8 oz)   17 119 kg (262 lb 4.8 oz)              Today, you had the following     No orders found for display         Today's Medication Changes          These changes are accurate as of: 17 10:49 AM.  If you have any questions, ask your nurse or doctor.               Start taking these medicines.        Dose/Directions    docusate sodium 100 MG capsule   Commonly known as:  COLACE   Used for:  Slow transit constipation   Started by:  Delisa Montgomery MD        Dose:  100 mg   Take 1 capsule (100 mg) by mouth 2 times daily   Quantity:  60 capsule   Refills:  1       sennosides 8.6 MG tablet   Commonly known as:  SENOKOT   Used for:  Slow transit constipation   Started by:  Delisa Montgomery MD        Dose:  1 tablet   Take 1 tablet by mouth daily   Quantity:  120 each   Refills:  1            Where to get your medicines    "   These medications were sent to [facility] [address] - [address] [place] AT [facility] [address] [place] [address]     Phone:  [phone]     docusate sodium 100 MG capsule    sennosides 8.6 MG tablet                Primary Care Provider    Physician No Ref-Primary       No address on file        Thank you!     Thank you for choosing [facility]  for your care. Our goal is always to provide you with excellent care. Hearing back from our patients is one way we can continue to improve our services. Please take a few minutes to complete the written survey that you may receive in the mail after your visit with us. Thank you!             Your Updated Medication List - Protect others around you: Learn how to safely use, store and throw away your medicines at www.disposemymeds.org.          This list is accurate as of: 6/8/17 10:49 AM.  Always use your most recent med list.                   Brand Name Dispense Instructions for use    albuterol 108 (90 BASE) MCG/ACT Inhaler   Generic drug:  albuterol      Inhale 2 puffs into the lungs every 4 hours as needed for shortness of breath / dyspnea or wheezing       amiodarone 200 MG tablet    PACERONE/CODARONE     Take 100 mg by mouth daily       BENADRYL 25 MG capsule   Generic drug:  diphenhydrAMINE      Take 25 mg by mouth nightly as needed for itching or allergies       BREO ELLIPTA 200-25 MCG/INH oral inhaler   Generic drug:  fluticasone-vilanterol      Inhale 1 puff into the lungs daily       bumetanide 2 MG tablet    BUMEX    60 tablet    Take 1 tablet (2 mg) by mouth 2 times daily       celeXA 20 MG tablet   Generic drug:  citalopram      Take 20 mg by mouth daily       cyanocobalamin 1000 MCG/ML injection    VITAMIN B12     Inject 1,000 mcg into the muscle every 30 days       diclofenac 1 % Gel topical gel    VOLTAREN    100 g    Apply 2 g topically 4 times daily as needed for moderate pain       DIGOX  125 MCG tablet   Generic drug:  digoxin      Take 125 mcg by mouth daily       docusate sodium 100 MG capsule    COLACE    60 capsule    Take 1 capsule (100 mg) by mouth 2 times daily       GLUCOPHAGE 1000 MG tablet   Generic drug:  metFORMIN      Take 1,000 mg by mouth daily (with dinner)       hydrALAZINE 10 MG tablet    APRESOLINE    120 tablet    Take 1 tablet (10 mg) by mouth 3 times daily       INCRUSE ELLIPTA 62.5 MCG/INH oral inhaler   Generic drug:  umeclidinium      Inhale 1 puff into the lungs daily       ipratropium - albuterol 0.5 mg/2.5 mg/3 mL 0.5-2.5 (3) MG/3ML neb solution    DUONEB     Take 3 mLs by nebulization every 4 hours as needed for shortness of breath / dyspnea or wheezing       losartan 50 MG tablet    COZAAR    30 tablet    Take 0.5 tablets (25 mg) by mouth 2 times daily       metoprolol 100 MG 24 hr tablet    TOPROL XL    60 tablet    Take 1 tablet (100 mg) by mouth 2 times daily       sennosides 8.6 MG tablet    SENOKOT    120 each    Take 1 tablet by mouth daily       SINGULAIR 10 MG tablet   Generic drug:  montelukast      Take 10 mg by mouth At Bedtime       spironolactone 25 MG tablet    ALDACTONE     Take 25 mg by mouth 2 times daily       TESSALON PERLES 100 MG capsule   Generic drug:  benzonatate      Take 200 mg by mouth 3 times daily as needed for cough       traMADol 50 MG tablet    ULTRAM     Take 100 mg by mouth every 6 hours as needed for moderate pain       warfarin 5 MG tablet    COUMADIN     Take 5 mg by mouth See Admin Instructions 10 mg M,W,F, 7.5 mg all other days       zinc sulfate 220 (50 ZN) MG capsule    ZINCATE     Take 220 mg by mouth 2 times daily

## 2017-06-08 NOTE — PATIENT INSTRUCTIONS
We will be in touch with you about the June 26 th date.     Start colace and senna for constipation.     Rajani will call you about a nurse practitioner visit in the next few weeks.

## 2017-06-08 NOTE — NURSING NOTE
Chief Complaint   Patient presents with     Follow Up For     hospital discharge follow up for HF/ Labs

## 2017-06-08 NOTE — PROGRESS NOTES
June 8, 2017    Dear Buddy,     I had the pleasure of seeing Jim Willingham in the [facility] on June 8, 2017. As you know he is a pleasant 62 year old male with a long standing history of non- ischemic cardiomyopathy who has been declining, particularly over the last year.     His last measured ejection fraction was in the 20 % percent range with a dilated LV (7.7 cm) and severe MR. He was just hospitalized last month for diuresis and expedited LVAD work up. He overall feels that he has no energy and is severely fatigued to do the things he wants to do, it is getting harder to watch his granddaughter (who they just adopted) and he is the primary care-giver. He has had one LOC earlier this year which was not thought to be  VT. Although he is fatigued, he reports that his SOB has improved dramatically after diuresis on last admission, he is not orthopneic, no PND, able to ambulate 1-2 blocks without SOB, no palpitations, has lightheadedness, but no syncope since his last event several months ago which was not thought to be due to VT.    He still needs to finish his PFTs, 6 min walk test, and CPX next week.    He has been on excellent medical therapy for some and has had a BiV pacer since 2008. He denies recent palpitations and has had 3 ICD shocks since his ICD was placed.       PAST MEDICAL HISTORY:  Past Medical History:   Diagnosis Date     Benign essential hypertension 5/11/2017     Cardiomyopathy, unspecified (H) 5/8/2017     CKD (chronic kidney disease) stage 3, GFR 30-59 ml/min 5/11/2017     Depression 5/11/2017     Diabetes mellitus (H) 5/11/2017     H/O gastric bypass 5/11/2017     ICD (implantable cardioverter-defibrillator), biventricular, in situ 5/11/2017     NICM (nonischemic cardiomyopathy) (H)/ EF 20% 5/11/2017    ECHO: LVEDd. 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 They have several children (from separate partners) and they have just adopted a granddaughter who he is the primary care giver.     He stopped ETOH in  2010 and would drink socially before that but never had any life difficulties related to alcohol abuse.     He stopped smoking in 1994. No ilicits.     Family history: there is no family history of premature MI or SCD, or CHF      CURRENT MEDICATIONS:  Current Outpatient Prescriptions   Medication Sig Dispense Refill     diclofenac (VOLTAREN) 1 % GEL topical gel Apply 2 g topically 4 times daily as needed for moderate pain 100 g 0     bumetanide (BUMEX) 2 MG tablet Take 1 tablet (2 mg) by mouth 2 times daily 60 tablet 0     metoprolol (TOPROL XL) 100 MG 24 hr tablet Take 1 tablet (100 mg) by mouth 2 times daily 60 tablet 5     losartan (COZAAR) 50 MG tablet Take 0.5 tablets (25 mg) by mouth 2 times daily 30 tablet 1     hydrALAZINE (APRESOLINE) 10 MG tablet Take 1 tablet (10 mg) by mouth 3 times daily 120 tablet 1     benzonatate (TESSALON PERLES) 100 MG capsule Take 200 mg by mouth 3 times daily as needed for cough       albuterol (ALBUTEROL) 108 (90 BASE) MCG/ACT Inhaler Inhale 2 puffs into the lungs every 4 hours as needed for shortness of breath / dyspnea or wheezing       ipratropium - albuterol 0.5 mg/2.5 mg/3 mL (DUONEB) 0.5-2.5 (3) MG/3ML neb solution Take 3 mLs by nebulization every 4 hours as needed for shortness of breath / dyspnea or wheezing       amiodarone (PACERONE/CODARONE) 200 MG tablet Take 100 mg by mouth daily       citalopram (CELEXA) 20 MG tablet Take 20 mg by mouth daily       cyanocobalamin (VITAMIN B12) 1000 MCG/ML injection Inject 1,000 mcg into the muscle every 30 days       digoxin  "(DIGOX) 125 MCG tablet Take 125 mcg by mouth daily       diphenhydrAMINE (BENADRYL) 25 MG capsule Take 25 mg by mouth nightly as needed for itching or allergies       fluticasone-vilanterol (BREO ELLIPTA) 200-25 MCG/INH oral inhaler Inhale 1 puff into the lungs daily       metFORMIN (GLUCOPHAGE) 1000 MG tablet Take 1,000 mg by mouth daily (with dinner)       montelukast (SINGULAIR) 10 MG tablet Take 10 mg by mouth At Bedtime       spironolactone (ALDACTONE) 25 MG tablet Take 25 mg by mouth 2 times daily       traMADol (ULTRAM) 50 MG tablet Take 100 mg by mouth every 6 hours as needed for moderate pain       umeclidinium (INCRUSE ELLIPTA) 62.5 MCG/INH oral inhaler Inhale 1 puff into the lungs daily       warfarin (COUMADIN) 5 MG tablet Take 5 mg by mouth See Admin Instructions 10 mg M,W,F, 7.5 mg all other days       zinc sulfate (ZINCATE) 220 (50 ZN) MG capsule Take 220 mg by mouth 2 times daily         ROS:   Constitutional: No fever, chills, or sweats. Weight is stable at present  ENT: No visual disturbance, ear ache, epistaxis, sore throat.   Allergies/Immunologic: Negative.   Respiratory: No cough, hemoptysis.   Cardiovascular: As per HPI.   GI: No nausea, vomiting, hematemesis, melena, or hematochezia.   : No urinary frequency, dysuria, or hematuria.   Integument: Negative.   Psychiatric: worried about the state of his health    Neuro: Negative.   Endocrinology: Negative.   Musculoskeletal: Negative.    EXAM:  Ht 1.727 m (5' 8\")  Wt 116.1 kg (255 lb 14.4 oz)  BMI 38.91 kg/m2  General: appears comfortable, alert and articulate, obese   Head: normocephalic, atraumatic  Eyes: anicteric sclera, EOMI  Neck: no adenopathy  Orophyarynx: moist mucosa, no lesions, dentition intact  Heart: PMI diffuse, regular, S1/S2, no murmur, gallop, rub, estimated JVP 10 cm , no edema  Lungs: clear, no rales or wheezing  Abdomen: soft, non-tender, bowel sounds present, no hepatosplenomegaly   Neurological: normal speech and " affect, no gross motor deficits      Last coronary angiogram and RHC May 10, 2017       Pressures      Phase:Baseline      AO :  70.00 mmHg / 49.00 mmHg ( 57.00 mmHg )  @ 10:39:59 AM            72.00 mmHg / 49.00 mmHg ( 58.00 mmHg )  @ 10:40:10 AM      LV :  83.00 mmHg / 16.00 mmHg / 23.00 mmHg  @ 10:46:50 AM      LV Pull back :  85.00 mmHg / 15.00 mmHg / 23.00 mmHg  @ 10:46:59 AM      AO Pull back :  86.00 mmHg / 51.00 mmHg ( 74.00 mmHg )  @ 10:47:06 AM      RA :  a wave = v wave = mean = 15.00 mmHg  @ 11:06:43 AM      RV :  44.00 mmHg / 15.00 mmHg / 18.00 mmHg  @ 11:06:27 AM      PA :  41.00 mmHg / 20.00 mmHg ( 32.00 mmHg )  @ 10:59:05 AM      PCW :  a wave = v wave = mean = 32.00 mmHg  @ 11:00:00 AM    Valves      Phase:Baseline      AV :  0.00 mmHg  @ 10:22:09 AM      AV Mean Gradient:  13.70 mmHg  @ 10:22:09 AM      AV Valve Area:  1.27 cm2  @ 10:22:09 AM      Cardiac Output      Phase:Baseline      Thermo Cardiac Output:  4.77 l/min  @ 10:22:09 AM    Flow      Phase:Baseline      Qp :  4.77 l/min  @ 10:22:09 AM      Qs :  4.77 l/min  @ 10:22:09 AM         Status       Last echocardiogram:    Severely increased left ventricular size. LVEDd is 7.66 cm   2. LV function is severely reduced. The visually estimated ejection fraction is 20%.   3. Septal motion consistent with conduction delay.   4. Restrictive pattern of diastolic filling consistent with severe diastolic dysfunction.   5. Severely dilated left atrium.   6. Severe mitral valve regurgitation.   7. Moderate pulmonary hypertension. PAP 48 mmHG plus RAP, or approximately 55-58 mmHG.   8. Compared to prior study (1/26/2016); The EF remains severely depressed at 20%. Mitral regurgitation has increased from moderate to severe, likely due to the patient's cardiomyopathy. PAP has increased from 22 mmHG plus RAP to 48 mmHG feliz RAP.   9. The rhythm is normal sinus.  ualized. Trace pulmonic valve regurgitation.    LVIDd (2D):     7.66 cm (3.4-5.7) LA Major  diam,s, A2c 7.5 cm      Pulmonary function tests:     The Patient reportedly appeared to give maximal effort.  The PFT   testing was performed on calibrated equipment and recorded in   compliance with the ATS/ERS Task Force Standardization of Lung   Function Testing. The PFT testing was performed in the sitting   position.    After acceptable spirograms had been obtained, the following   values were obtained and/or calculated:    Pre bronchodilator Spirometry:   SVC 2.35 L (61 %)  DLCO unc 16.89 ml/min/mmHg (59 %)  DLCOcor 17.57 ml/min/mmHg (62 %)  HGB 13.3 gm/dL    INTERPRETATION:    Slow vital capacity (SVC) is moderately reduced.    The DLCO is corrected for hemoglobin and is moderately reduced.    An isolated reduction in DLCO with normal lung volumes is seen in   patients with anemia, chronic pulmonary embolism, primary   pulmonary hypertension, carboxyhemoglobinemia, vasculitis or   early interstitial lung disease.      ECG: (per north) V pacing rate 90     Labs:    Lab Results   Component Value Date    WBC 6.1 05/26/2017    HGB 11.1 (L) 05/26/2017    HCT 34.2 (L) 05/26/2017     05/26/2017     06/08/2017    POTASSIUM 4.2 06/08/2017    CHLORIDE 99 06/08/2017    CO2 27 06/08/2017    BUN 50 (H) 06/08/2017    CR 2.36 (H) 06/08/2017    GLC 88 06/08/2017    SED 25 (H) 05/26/2017    NTBNPI 1832 (H) 05/23/2017    NTBNP 2502 (H) 05/12/2017    AST 26 05/23/2017    ALT 44 05/23/2017    ALKPHOS 72 05/23/2017    BILITOTAL 1.1 05/23/2017    INR 4.20 (H) 06/08/2017       Assessment and Plan:   In summary this is a very pleasant 62 M with a history of NICM which has been long standing who is referred for consideration of advanced therapies. 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 He has decreasing neurohormonal blockade tolerance, he has several ER visits and admissions for heart failure, his LV is close to 8 cm,  his renal function has been slowly worsening over time, he had a troponin elevation during his last admission, and his functional status is poor (class IIIB- IV)  despite diuresis and medical optimization. His cardiac output was also low on his right heart catheterization. I do not think the mitral clip is an option in his case and it would not fix the underlying myocardial problem.     We are planning on moving ahead with LVAD in his case  He has blood group O (longest wait for cardiac transplant) and his BMI is likely going to be prohibitive for a direct to transplant approach in the time frame that he is going to need advanced therapies (our cut of is less than BMI 37). We will have an eye towards listing him when he recovers from LVAD.    Given his worsened renal function today I am holding his evening diuretics, digoxin, aldactone, and ARB. We are repeating labs tomorrow. If his renal function is trending in the right direction through the weekend we may be able to wait until the end of the month for LVAD. If not, I will bring him in next week for implantation.     We also talked today about the PREVENT II study which he would qualify for.       1. Chronic systolic heart failure secondary NICM EF 20%  Stage D  NYHA Class IIIB  ACEi/ARB yes - holding today   BB yes   Aldosterone antagonist - on hold   SCD prophylaxis yes  % BiV pacing: pending for today  Fluid status mildly hypervolemic, continue bumex 2mg bid  NSAID use: contraindicated    He still needs to complete his PFTs, 6 min walk, and CPX.    Other:   VT: on low dose amio- controlled   afteroload reduction: on hydralzine 10 mg TID  NICM: on dig 125 mcg  paroxysmal a fib: on coumadin- this will need to be held pre-VAD    History of asthma/COPD: will need repeat PFTs as above although last tests were not prohibitive for LVAD/transplant and he is not on oxygen     Jeffrey Gibson  Cardiovascular fellow  [phone]    I have seen and examined the patient with  the house staff on June 8, 2017 and agree with the outlined assessment and plan.    Delisa Montgomery MD   of Medicine   [facility] Division of Cardiology         CC    Chase Molina MD   [facility]

## 2017-06-09 LAB
ANION GAP SERPL CALCULATED.3IONS-SCNC: 9 MMOL/L
BUN SERPL-MCNC: 53 MG/DL
CALCIUM SERPL-MCNC: 8.7 MG/DL
CHLORIDE SERPLBLD-SCNC: 99 MMOL/L
CO2 SERPL-SCNC: 27 MMOL/L
CREAT SERPL-MCNC: 2.44 MG/DL
GFR SERPL CREATININE-BSD FRML MDRD: 27 ML/MIN/1.73M2
GLUCOSE SERPL-MCNC: 116 MG/DL (ref 70–99)
INR PPP: 4.7
LA PPP-IMP: ABNORMAL
POTASSIUM SERPL-SCNC: 4.1 MMOL/L
SODIUM SERPL-SCNC: 135 MMOL/L

## 2017-06-10 ENCOUNTER — TELEPHONE (OUTPATIENT)
Dept: CARDIOLOGY | Facility: CLINIC | Age: 62
End: 2017-06-10

## 2017-06-10 NOTE — TELEPHONE ENCOUNTER
D:  Followed up on labs drawn yesterday at [facility] in [place].  Results:  Na+ 135, K+ 4.1, Cl 99, CO2 27, BUN 53, Creainine 2.44, Glucose 116, Ca++ 8.7, PT 54.1, INR 4.7.  I:  Spoke with Dr. Montgomery to report these results.  She recommended that the patient be admitted to the hospital for further evaluation and treatment of acute decompensated heart failure.  I contacted the patient who reported that he would not be able to come to the hospital until his wife got home from work about 8:30 p.m.  We agreed that he would come tomorrow morning (6/11) at 0900, and would be admitted to the CVICU (4E).  He agreed to contact me if he had any worsening of symptoms.  I arranged for the admission with [facility], and notified the 4E charge nurse of the plan.  A:  Heart failure, deteriorating.    P:  Admit 6/11/17.

## 2017-06-11 ENCOUNTER — HOSPITAL ENCOUNTER (INPATIENT)
Facility: CLINIC | Age: 62
LOS: 2 days | Discharge: HOME | DRG: 314 | End: 2017-06-13
Attending: INTERNAL MEDICINE | Admitting: INTERNAL MEDICINE
Payer: COMMERCIAL

## 2017-06-11 DIAGNOSIS — I42.8 NICM (NONISCHEMIC CARDIOMYOPATHY) (H): ICD-10-CM

## 2017-06-11 DIAGNOSIS — I47.29 PAROXYSMAL VT (H): ICD-10-CM

## 2017-06-11 DIAGNOSIS — I95.89 OTHER SPECIFIED HYPOTENSION: Primary | ICD-10-CM

## 2017-06-11 DIAGNOSIS — M10.322 ACUTE GOUT DUE TO RENAL IMPAIRMENT INVOLVING LEFT ELBOW: ICD-10-CM

## 2017-06-11 PROBLEM — I50.9 HEART FAILURE (H): Status: ACTIVE | Noted: 2017-06-11

## 2017-06-11 LAB
ALBUMIN SERPL-MCNC: 3.5 G/DL (ref 3.4–5)
ALP SERPL-CCNC: 69 U/L (ref 40–150)
ALT SERPL W P-5'-P-CCNC: 27 U/L (ref 0–70)
ANION GAP SERPL CALCULATED.3IONS-SCNC: 4 MMOL/L (ref 3–14)
AST SERPL W P-5'-P-CCNC: 27 U/L (ref 0–45)
BILIRUB SERPL-MCNC: 0.6 MG/DL (ref 0.2–1.3)
BUN SERPL-MCNC: 48 MG/DL (ref 7–30)
CA-I BLD-MCNC: 4.5 MG/DL (ref 4.4–5.2)
CALCIUM SERPL-MCNC: 8.3 MG/DL (ref 8.5–10.1)
CHLORIDE SERPL-SCNC: 104 MMOL/L (ref 94–109)
CO2 SERPL-SCNC: 28 MMOL/L (ref 20–32)
CREAT SERPL-MCNC: 2.14 MG/DL (ref 0.66–1.25)
ERYTHROCYTE [DISTWIDTH] IN BLOOD BY AUTOMATED COUNT: 13.8 % (ref 10–15)
GFR SERPL CREATININE-BSD FRML MDRD: 32 ML/MIN/1.7M2
GLUCOSE BLDC GLUCOMTR-MCNC: 122 MG/DL (ref 70–99)
GLUCOSE BLDC GLUCOMTR-MCNC: 155 MG/DL (ref 70–99)
GLUCOSE SERPL-MCNC: 103 MG/DL (ref 70–99)
HCT VFR BLD AUTO: 34.8 % (ref 40–53)
HGB BLD-MCNC: 10.8 G/DL (ref 13.3–17.7)
INR PPP: 3.48 (ref 0.86–1.14)
MAGNESIUM SERPL-MCNC: 2 MG/DL (ref 1.6–2.3)
MCH RBC QN AUTO: 29.7 PG (ref 26.5–33)
MCHC RBC AUTO-ENTMCNC: 31 G/DL (ref 31.5–36.5)
MCV RBC AUTO: 96 FL (ref 78–100)
MRSA DNA SPEC QL NAA+PROBE: NORMAL
PHOSPHATE SERPL-MCNC: 3.3 MG/DL (ref 2.5–4.5)
PLATELET # BLD AUTO: 204 10E9/L (ref 150–450)
POTASSIUM SERPL-SCNC: 5 MMOL/L (ref 3.4–5.3)
PROT SERPL-MCNC: 6.8 G/DL (ref 6.8–8.8)
RBC # BLD AUTO: 3.64 10E12/L (ref 4.4–5.9)
SODIUM SERPL-SCNC: 137 MMOL/L (ref 133–144)
SPECIMEN SOURCE: NORMAL
WBC # BLD AUTO: 8.4 10E9/L (ref 4–11)

## 2017-06-11 PROCEDURE — 93005 ELECTROCARDIOGRAM TRACING: CPT

## 2017-06-11 PROCEDURE — 36415 COLL VENOUS BLD VENIPUNCTURE: CPT | Performed by: STUDENT IN AN ORGANIZED HEALTH CARE EDUCATION/TRAINING PROGRAM

## 2017-06-11 PROCEDURE — 40000275 ZZH STATISTIC RCP TIME EA 10 MIN

## 2017-06-11 PROCEDURE — 40000802 ZZH SITE CHECK

## 2017-06-11 PROCEDURE — 25000131 ZZH RX MED GY IP 250 OP 636 PS 637: Performed by: STUDENT IN AN ORGANIZED HEALTH CARE EDUCATION/TRAINING PROGRAM

## 2017-06-11 PROCEDURE — 85027 COMPLETE CBC AUTOMATED: CPT | Performed by: STUDENT IN AN ORGANIZED HEALTH CARE EDUCATION/TRAINING PROGRAM

## 2017-06-11 PROCEDURE — 83735 ASSAY OF MAGNESIUM: CPT | Performed by: STUDENT IN AN ORGANIZED HEALTH CARE EDUCATION/TRAINING PROGRAM

## 2017-06-11 PROCEDURE — 25000125 ZZHC RX 250: Performed by: STUDENT IN AN ORGANIZED HEALTH CARE EDUCATION/TRAINING PROGRAM

## 2017-06-11 PROCEDURE — 3E033XZ INTRODUCTION OF VASOPRESSOR INTO PERIPHERAL VEIN, PERCUTANEOUS APPROACH: ICD-10-PCS | Performed by: INTERNAL MEDICINE

## 2017-06-11 PROCEDURE — 93010 ELECTROCARDIOGRAM REPORT: CPT | Performed by: INTERNAL MEDICINE

## 2017-06-11 PROCEDURE — 40000141 ZZH STATISTIC PERIPHERAL IV START W/O US GUIDANCE

## 2017-06-11 PROCEDURE — 99291 CRITICAL CARE FIRST HOUR: CPT | Mod: GV | Performed by: INTERNAL MEDICINE

## 2017-06-11 PROCEDURE — 94640 AIRWAY INHALATION TREATMENT: CPT | Mod: 76

## 2017-06-11 PROCEDURE — 85610 PROTHROMBIN TIME: CPT | Performed by: STUDENT IN AN ORGANIZED HEALTH CARE EDUCATION/TRAINING PROGRAM

## 2017-06-11 PROCEDURE — 82330 ASSAY OF CALCIUM: CPT | Performed by: STUDENT IN AN ORGANIZED HEALTH CARE EDUCATION/TRAINING PROGRAM

## 2017-06-11 PROCEDURE — 25000128 H RX IP 250 OP 636: Performed by: INTERNAL MEDICINE

## 2017-06-11 PROCEDURE — 94640 AIRWAY INHALATION TREATMENT: CPT

## 2017-06-11 PROCEDURE — 87641 MR-STAPH DNA AMP PROBE: CPT | Performed by: STUDENT IN AN ORGANIZED HEALTH CARE EDUCATION/TRAINING PROGRAM

## 2017-06-11 PROCEDURE — 80053 COMPREHEN METABOLIC PANEL: CPT | Performed by: STUDENT IN AN ORGANIZED HEALTH CARE EDUCATION/TRAINING PROGRAM

## 2017-06-11 PROCEDURE — 20000004 ZZH R&B ICU UMMC

## 2017-06-11 PROCEDURE — 25000132 ZZH RX MED GY IP 250 OP 250 PS 637: Performed by: STUDENT IN AN ORGANIZED HEALTH CARE EDUCATION/TRAINING PROGRAM

## 2017-06-11 PROCEDURE — 87640 STAPH A DNA AMP PROBE: CPT | Performed by: STUDENT IN AN ORGANIZED HEALTH CARE EDUCATION/TRAINING PROGRAM

## 2017-06-11 PROCEDURE — 00000146 ZZHCL STATISTIC GLUCOSE BY METER IP

## 2017-06-11 PROCEDURE — 84100 ASSAY OF PHOSPHORUS: CPT | Performed by: STUDENT IN AN ORGANIZED HEALTH CARE EDUCATION/TRAINING PROGRAM

## 2017-06-11 RX ORDER — SPIRONOLACTONE 25 MG/1
25 TABLET ORAL
Status: DISCONTINUED | OUTPATIENT
Start: 2017-06-11 | End: 2017-06-11

## 2017-06-11 RX ORDER — HYDRALAZINE HYDROCHLORIDE 10 MG/1
10 TABLET, FILM COATED ORAL 3 TIMES DAILY
Status: DISCONTINUED | OUTPATIENT
Start: 2017-06-11 | End: 2017-06-11

## 2017-06-11 RX ORDER — IPRATROPIUM BROMIDE AND ALBUTEROL SULFATE 2.5; .5 MG/3ML; MG/3ML
3 SOLUTION RESPIRATORY (INHALATION) EVERY 4 HOURS PRN
Status: DISCONTINUED | OUTPATIENT
Start: 2017-06-11 | End: 2017-06-11

## 2017-06-11 RX ORDER — CITALOPRAM HYDROBROMIDE 20 MG/1
20 TABLET ORAL DAILY
Status: DISCONTINUED | OUTPATIENT
Start: 2017-06-11 | End: 2017-06-13 | Stop reason: HOSPADM

## 2017-06-11 RX ORDER — BENZONATATE 100 MG/1
200 CAPSULE ORAL 3 TIMES DAILY PRN
Status: DISCONTINUED | OUTPATIENT
Start: 2017-06-11 | End: 2017-06-13 | Stop reason: HOSPADM

## 2017-06-11 RX ORDER — NICOTINE POLACRILEX 4 MG
15-30 LOZENGE BUCCAL
Status: DISCONTINUED | OUTPATIENT
Start: 2017-06-11 | End: 2017-06-13 | Stop reason: HOSPADM

## 2017-06-11 RX ORDER — DIPHENHYDRAMINE HCL 25 MG
25 CAPSULE ORAL
Status: DISCONTINUED | OUTPATIENT
Start: 2017-06-11 | End: 2017-06-13 | Stop reason: HOSPADM

## 2017-06-11 RX ORDER — MONTELUKAST SODIUM 10 MG/1
10 TABLET ORAL AT BEDTIME
Status: DISCONTINUED | OUTPATIENT
Start: 2017-06-11 | End: 2017-06-13 | Stop reason: HOSPADM

## 2017-06-11 RX ORDER — POLYETHYLENE GLYCOL 3350 17 G/17G
17 POWDER, FOR SOLUTION ORAL DAILY PRN
Status: DISCONTINUED | OUTPATIENT
Start: 2017-06-11 | End: 2017-06-13 | Stop reason: HOSPADM

## 2017-06-11 RX ORDER — LIDOCAINE 40 MG/G
CREAM TOPICAL
Status: DISCONTINUED | OUTPATIENT
Start: 2017-06-11 | End: 2017-06-13 | Stop reason: HOSPADM

## 2017-06-11 RX ORDER — DIGOXIN 125 MCG
125 TABLET ORAL DAILY
Status: DISCONTINUED | OUTPATIENT
Start: 2017-06-11 | End: 2017-06-11 | Stop reason: DRUGHIGH

## 2017-06-11 RX ORDER — DOPAMINE HYDROCHLORIDE 160 MG/100ML
2-20 INJECTION, SOLUTION INTRAVENOUS CONTINUOUS
Status: DISCONTINUED | OUTPATIENT
Start: 2017-06-11 | End: 2017-06-13 | Stop reason: HOSPADM

## 2017-06-11 RX ORDER — NALOXONE HYDROCHLORIDE 0.4 MG/ML
.1-.4 INJECTION, SOLUTION INTRAMUSCULAR; INTRAVENOUS; SUBCUTANEOUS
Status: DISCONTINUED | OUTPATIENT
Start: 2017-06-11 | End: 2017-06-13 | Stop reason: HOSPADM

## 2017-06-11 RX ORDER — AMOXICILLIN 250 MG
1-2 CAPSULE ORAL 2 TIMES DAILY PRN
Status: DISCONTINUED | OUTPATIENT
Start: 2017-06-11 | End: 2017-06-12

## 2017-06-11 RX ORDER — LOSARTAN POTASSIUM 25 MG/1
25 TABLET ORAL
Status: DISCONTINUED | OUTPATIENT
Start: 2017-06-11 | End: 2017-06-11

## 2017-06-11 RX ORDER — ALBUTEROL SULFATE 90 UG/1
2 AEROSOL, METERED RESPIRATORY (INHALATION) EVERY 4 HOURS PRN
Status: DISCONTINUED | OUTPATIENT
Start: 2017-06-11 | End: 2017-06-13 | Stop reason: HOSPADM

## 2017-06-11 RX ORDER — TRAMADOL HYDROCHLORIDE 50 MG/1
100 TABLET ORAL EVERY 6 HOURS PRN
Status: DISCONTINUED | OUTPATIENT
Start: 2017-06-11 | End: 2017-06-13 | Stop reason: HOSPADM

## 2017-06-11 RX ORDER — ALBUTEROL SULFATE 90 UG/1
2 AEROSOL, METERED RESPIRATORY (INHALATION)
Status: DISCONTINUED | OUTPATIENT
Start: 2017-06-11 | End: 2017-06-11

## 2017-06-11 RX ORDER — ONDANSETRON 4 MG/1
4 TABLET, ORALLY DISINTEGRATING ORAL EVERY 6 HOURS PRN
Status: DISCONTINUED | OUTPATIENT
Start: 2017-06-11 | End: 2017-06-11

## 2017-06-11 RX ORDER — AMIODARONE HYDROCHLORIDE 100 MG/1
100 TABLET ORAL DAILY
Status: DISCONTINUED | OUTPATIENT
Start: 2017-06-11 | End: 2017-06-13 | Stop reason: HOSPADM

## 2017-06-11 RX ORDER — DEXTROSE MONOHYDRATE 25 G/50ML
25-50 INJECTION, SOLUTION INTRAVENOUS
Status: DISCONTINUED | OUTPATIENT
Start: 2017-06-11 | End: 2017-06-13 | Stop reason: HOSPADM

## 2017-06-11 RX ORDER — ZINC SULFATE 50(220)MG
220 CAPSULE ORAL 2 TIMES DAILY
Status: DISCONTINUED | OUTPATIENT
Start: 2017-06-11 | End: 2017-06-13 | Stop reason: HOSPADM

## 2017-06-11 RX ORDER — ALBUTEROL SULFATE 90 UG/1
2 AEROSOL, METERED RESPIRATORY (INHALATION) EVERY 4 HOURS PRN
Status: DISCONTINUED | OUTPATIENT
Start: 2017-06-11 | End: 2017-06-11

## 2017-06-11 RX ORDER — IPRATROPIUM BROMIDE AND ALBUTEROL SULFATE 2.5; .5 MG/3ML; MG/3ML
3 SOLUTION RESPIRATORY (INHALATION)
Status: DISCONTINUED | OUTPATIENT
Start: 2017-06-11 | End: 2017-06-13 | Stop reason: HOSPADM

## 2017-06-11 RX ORDER — ONDANSETRON 2 MG/ML
4 INJECTION INTRAMUSCULAR; INTRAVENOUS EVERY 6 HOURS PRN
Status: DISCONTINUED | OUTPATIENT
Start: 2017-06-11 | End: 2017-06-11

## 2017-06-11 RX ORDER — METOPROLOL SUCCINATE 50 MG/1
100 TABLET, EXTENDED RELEASE ORAL 2 TIMES DAILY
Status: DISCONTINUED | OUTPATIENT
Start: 2017-06-11 | End: 2017-06-11

## 2017-06-11 RX ADMIN — MONTELUKAST SODIUM 10 MG: 10 TABLET, FILM COATED ORAL at 21:48

## 2017-06-11 RX ADMIN — IPRATROPIUM BROMIDE AND ALBUTEROL SULFATE 3 ML: .5; 3 SOLUTION RESPIRATORY (INHALATION) at 16:54

## 2017-06-11 RX ADMIN — DOPAMINE HYDROCHLORIDE 2 MCG/KG/MIN: 160 INJECTION, SOLUTION INTRAVENOUS at 18:30

## 2017-06-11 RX ADMIN — Medication 220 MG: at 20:38

## 2017-06-11 RX ADMIN — SENNOSIDES AND DOCUSATE SODIUM 1 TABLET: 8.6; 5 TABLET ORAL at 21:48

## 2017-06-11 RX ADMIN — IPRATROPIUM BROMIDE AND ALBUTEROL SULFATE 3 ML: .5; 3 SOLUTION RESPIRATORY (INHALATION) at 21:00

## 2017-06-11 RX ADMIN — TRAMADOL HYDROCHLORIDE 100 MG: 50 TABLET, COATED ORAL at 21:48

## 2017-06-11 RX ADMIN — INSULIN ASPART 1 UNITS: 100 INJECTION, SOLUTION INTRAVENOUS; SUBCUTANEOUS at 19:17

## 2017-06-11 ASSESSMENT — ACTIVITIES OF DAILY LIVING (ADL)
DRESS: 0-->INDEPENDENT
COGNITION: 0 - NO COGNITION ISSUES REPORTED
RETIRED_COMMUNICATION: 0-->UNDERSTANDS/COMMUNICATES WITHOUT DIFFICULTY
SWALLOWING: 0-->SWALLOWS FOODS/LIQUIDS WITHOUT DIFFICULTY
AMBULATION: 0-->INDEPENDENT
BATHING: 0-->INDEPENDENT
TRANSFERRING: 0-->INDEPENDENT
RETIRED_EATING: 0-->INDEPENDENT
TOILETING: 0-->INDEPENDENT
NUMBER_OF_TIMES_PATIENT_HAS_FALLEN_WITHIN_LAST_SIX_MONTHS: 1
FALL_HISTORY_WITHIN_LAST_SIX_MONTHS: YES

## 2017-06-11 ASSESSMENT — PAIN DESCRIPTION - DESCRIPTORS
DESCRIPTORS: ACHING
DESCRIPTORS: ACHING;DISCOMFORT

## 2017-06-11 NOTE — PLAN OF CARE
Problem: Cardiac: Heart Failure (Adult)  Goal: Signs and Symptoms of Listed Potential Problems Will be Absent or Manageable (Cardiac: Heart Failure)  Signs and symptoms of listed potential problems will be absent or manageable by discharge/transition of care (reference Cardiac: Heart Failure (Adult) CPG).  Outcome: No Change  Pt arrived from home at 1300. Labs drawn, PIV inserted, vitals assessed and stable. BP meds dc'd d/t pt having softer pressures with MAPs in low 60s. Creat rise recently, started dopamine at low dose in hopes to maintain sys >90, MAPs 70-80 to help kidneys. Denies pain. Room air. Plan for PICC placement and pending INR will have right heart cath tomorrow. LVAD work up in progress. Wife at bs and updated with plan of care. See MAR and flow sheet for additional information.

## 2017-06-11 NOTE — IP AVS SNAPSHOT
[address] [address]    Phone:  [phone]                                       After Visit Summary   6/11/2017    Jim Willingham    MRN: [id number]           After Visit Summary Signature Page     I have received my discharge instructions, and my questions have been answered. I have discussed any challenges I see with this plan with the nurse or doctor.    ..........................................................................................................................................  Patient/Patient Representative Signature      ..........................................................................................................................................  Patient Representative Print Name and Relationship to Patient    ..................................................               ................................................  Date                                            Time    ..........................................................................................................................................  Reviewed by Signature/Title    ...................................................              ..............................................  Date                                                            Time

## 2017-06-11 NOTE — IP AVS SNAPSHOT
MRN:[id number]                      After Visit Summary   6/11/2017    Jim Willingham    MRN: [id number]           Thank you!     Thank you for choosing [facility] for your care. Our goal is always to provide you with excellent care. Hearing back from our patients is one way we can continue to improve our services. Please take a few minutes to complete the written survey that you may receive in the mail after you visit with us. Thank you!        Patient Information     Date Of Birth          1955        Designated Caregiver       Most Recent Value    Caregiver    Will someone help with your care after discharge? yes    Name of designated caregiver Cate    Phone number of caregiver see facesheet    Caregiver address same      About your hospital stay     You were admitted on:  June 11, 2017 You last received care in the:  [address] [facility]    You were discharged on:  June 13, 2017        Reason for your hospital stay       You were hospitalized due to an increasing creatinine and low blood pressure, suggesting cardiogenic shock. Your creatine decreased, and we are comfortable sending you home briefly before your LVAD placement next week.                  Who to Call     For medical emergencies, please call 911.  For non-urgent questions about your medical care, please call your primary care provider or clinic, None          Attending Provider     Provider Specialty    Precious Bautista MD Cardiology       Primary Care Provider    Physician No Ref-Primary      After Care Instructions     Activity       Your activity upon discharge: activity as tolerated            Diet       Follow this diet upon discharge: Orders Placed This Encounter      2 Gram Sodium Diet            Discharge Instructions       Please follow up with Dr. Delisa Montgomery on Friday, 6/16/2017 at 11am at the [facility].    Please follow up with your primary care provider in 1 month or after you return home  after your LVAD implantation to reassess your new diagnosis of gout. Please ask him or her to check your uric acid level and titrate your allopurinol dose to maintain a uric acid level below 6.    We have sent you home with a 5 day prescription of prednisone 40 mg. Use this 5 day course when you are experiencing a gout attack.            Monitor and record       weight every day                  Follow-up Appointments     Adult [facility] Follow-up and recommended labs and tests       Please follow up with Dr. Delisa Montgomery on Friday, 6/16 at 11am at the [facility] [place].    Appointments on [place] and/or [facility] (with [facility] or [facility] provider or service). Call [phone] if you haven't heard regarding these appointments within 7 days of discharge.            Follow Up and recommended labs and tests       Please follow up with your primary care provider in 1 month or after you return home after your LVAD implantation to reassess your new diagnosis of gout. Please ask him or her to check your uric acid level and titrate your allopurinol dose to maintain a uric acid level below 6.                  Your next 10 appointments already scheduled     Maximus 15, 2017  8:30 AM CDT   Card Cardpul Stress Tst Adult with [facility]   [facility] ([facility], [place])    [address] [address]               Maximus 15, 2017 11:45 AM CDT   Lab with  LAB   [facility] ([facility])    [address]  1st Floor  [facility] [id number] [phone]            Maximus 15, 2017 12:30 PM CDT   (Arrive by 12:15 PM)   RETURN HEART FAILURE with Delisa Montgomery MD   [facility] ([facility])    [address]  3rd Floor  [facility] [place] [facility] [id number] [phone]            Jun 19, 2017   Procedure with Ronnie Quigley MD   [facility], [place], Same Day Surgery (--)    [address] [address]  "  [phone]              Pending Results     No orders found from 2017 to 2017.            Statement of Approval     Ordered          17 [id number]  I have reviewed and agree with all the recommendations and orders detailed in this document.  EFFECTIVE NOW     Approved and electronically signed by:  Martínez Locke MD             Admission Information     Date & Time Provider Department Dept. Phone    2017 Precious Bautista MD [address] [facility] [place] [phone]      Your Vitals Were     Blood Pressure Temperature Respirations Weight Pulse Oximetry BMI (Body Mass Index)    106/66 98.2  F (36.8  C) (Oral) 20 118.5 kg (261 lb 3.9 oz) 98% 39.72 kg/m2      MyChart Information     [employer] lets you send messages to your doctor, view your test results, renew your prescriptions, schedule appointments and more. To sign up, go to [email or web address] . Click on \"Log in\" on the left side of the screen, which will take you to the Welcome page. Then click on \"Sign up Now\" on the right side of the page.     You will be asked to enter the access code listed below, as well as some personal information. Please follow the directions to create your username and password.     Your access code is: [email or web address]  Expires: 2017  6:30 AM     Your access code will  in 90 days. If you need help or a new code, please call your [facility] clinic or [phone].        Care EveryWhere ID     This is your Care EveryWhere ID. This could be used by other organizations to access your [facility] medical records  [id number]           Review of your medicines      START taking        Dose / Directions    * allopurinol 100 MG tablet   Commonly known as:  ZYLOPRIM   Used for:  Acute gout due to renal impairment involving left elbow        Dose:  50 mg   Take 0.5 tablets (50 mg) by mouth daily for 14 days   Quantity:  7 tablet   Refills:  0       * allopurinol 100 MG tablet   Commonly known as:  ZYLOPRIM   Used for:  Acute " gout due to renal impairment involving left elbow        Dose:  100 mg   Start taking on:  6/28/2017   Take 1 tablet (100 mg) by mouth daily   Quantity:  30 tablet   Refills:  1       colchicine 0.6 MG tablet   Used for:  Acute gout due to renal impairment involving left elbow        Dose:  0.6 mg   Take 1 tablet (0.6 mg) by mouth every other day   Quantity:  30 tablet   Refills:  1       oxyCODONE 5 MG/5ML solution   Commonly known as:  ROXICODONE   Used for:  Acute gout due to renal impairment involving left elbow        Dose:  2.5 mg   Take 2.5 mLs (2.5 mg) by mouth every 4 hours as needed (elbow pain)   Quantity:  90 mL   Refills:  0       predniSONE 20 MG tablet   Commonly known as:  DELTASONE   Used for:  Acute gout due to renal impairment involving left elbow        Dose:  40 mg   Start taking on:  7/5/2017   Take 2 tablets (40 mg) by mouth daily as needed   Quantity:  10 tablet   Refills:  1       * Notice:  This list has 2 medication(s) that are the same as other medications prescribed for you. Read the directions carefully, and ask your doctor or other care provider to review them with you.      CONTINUE these medicines which may have CHANGED, or have new prescriptions. If we are uncertain of the size of tablets/capsules you have at home, strength may be listed as something that might have changed.        Dose / Directions    bumetanide 1 MG tablet   Commonly known as:  BUMEX   This may have changed:    - medication strength  - how much to take   Used for:  NICM (nonischemic cardiomyopathy) (H)        Dose:  1 mg   Take 1 tablet (1 mg) by mouth 2 times daily for 7 days   Quantity:  14 tablet   Refills:  0         CONTINUE these medicines which have NOT CHANGED        Dose / Directions    albuterol 108 (90 BASE) MCG/ACT Inhaler   Generic drug:  albuterol        Dose:  2 puff   Inhale 2 puffs into the lungs every 4 hours as needed for shortness of breath / dyspnea or wheezing   Refills:  0       BENADRYL 25  MG capsule   Generic drug:  diphenhydrAMINE        Dose:  25 mg   Take 25 mg by mouth nightly as needed for itching or allergies   Refills:  0       BREO ELLIPTA 200-25 MCG/INH oral inhaler   Generic drug:  fluticasone-vilanterol        Dose:  1 puff   Inhale 1 puff into the lungs daily   Refills:  0       celeXA 20 MG tablet   Generic drug:  citalopram        Dose:  20 mg   Take 20 mg by mouth daily   Refills:  0       cyanocobalamin 1000 MCG/ML injection   Commonly known as:  VITAMIN B12        Dose:  1000 mcg   Inject 1,000 mcg into the muscle every 30 days   Refills:  0       diclofenac 1 % Gel topical gel   Commonly known as:  VOLTAREN   Used for:  Left elbow pain        Dose:  2 g   Apply 2 g topically 4 times daily as needed for moderate pain   Quantity:  100 g   Refills:  0       docusate sodium 100 MG capsule   Commonly known as:  COLACE   Used for:  Slow transit constipation        Dose:  100 mg   Take 1 capsule (100 mg) by mouth 2 times daily   Quantity:  60 capsule   Refills:  1       GLUCOPHAGE 1000 MG tablet   Generic drug:  metFORMIN        Dose:  1000 mg   Take 1,000 mg by mouth daily (with dinner)   Refills:  0       hydrALAZINE 10 MG tablet   Commonly known as:  APRESOLINE   Used for:  Chronic systolic heart failure (H)        Dose:  10 mg   Take 1 tablet (10 mg) by mouth 3 times daily   Quantity:  120 tablet   Refills:  1       INCRUSE ELLIPTA 62.5 MCG/INH oral inhaler   Generic drug:  umeclidinium        Dose:  1 puff   Inhale 1 puff into the lungs daily   Refills:  0       ipratropium - albuterol 0.5 mg/2.5 mg/3 mL 0.5-2.5 (3) MG/3ML neb solution   Commonly known as:  DUONEB        Dose:  3 mL   Take 3 mLs by nebulization every 4 hours as needed for shortness of breath / dyspnea or wheezing   Refills:  0       sennosides 8.6 MG tablet   Commonly known as:  SENOKOT   Used for:  Slow transit constipation        Dose:  1 tablet   Take 1 tablet by mouth daily   Quantity:  120 each   Refills:  1        SINGULAIR 10 MG tablet   Generic drug:  montelukast        Dose:  10 mg   Take 10 mg by mouth At Bedtime   Refills:  0       TESSALON PERLES 100 MG capsule   Generic drug:  benzonatate        Dose:  200 mg   Take 200 mg by mouth 3 times daily as needed for cough   Refills:  0       traMADol 50 MG tablet   Commonly known as:  ULTRAM        Dose:  100 mg   Take 100 mg by mouth every 6 hours as needed for moderate pain   Refills:  0       zinc sulfate 220 (50 ZN) MG capsule   Commonly known as:  ZINCATE        Dose:  220 mg   Take 220 mg by mouth 2 times daily   Refills:  0         STOP taking     DIGOX 125 MCG tablet   Generic drug:  digoxin           losartan 50 MG tablet   Commonly known as:  COZAAR           metoprolol 100 MG 24 hr tablet   Commonly known as:  TOPROL XL           spironolactone 25 MG tablet   Commonly known as:  ALDACTONE           warfarin 5 MG tablet   Commonly known as:  COUMADIN                Where to get your medicines      These medications were sent to [facility] [address] [place] [address] [place], [place] - [address] [place] AT [facility] [address]  [address] [place] [address]     Phone:  [phone]     allopurinol 100 MG tablet    allopurinol 100 MG tablet    bumetanide 1 MG tablet    colchicine 0.6 MG tablet    predniSONE 20 MG tablet         Some of these will need a paper prescription and others can be bought over the counter. Ask your nurse if you have questions.     Bring a paper prescription for each of these medications     oxyCODONE 5 MG/5ML solution                Protect others around you: Learn how to safely use, store and throw away your medicines at www.disposemymeds.org.             Medication List: This is a list of all your medications and when to take them. Check marks below indicate your daily home schedule. Keep this list as a reference.      Medications           Morning Afternoon Evening Bedtime As Needed    albuterol 108 (90 BASE) MCG/ACT Inhaler    Inhale 2 puffs into the lungs every 4 hours as needed for shortness of breath / dyspnea or wheezing   Generic drug:  albuterol                                * allopurinol 100 MG tablet   Commonly known as:  ZYLOPRIM   Take 0.5 tablets (50 mg) by mouth daily for 14 days   Last time this was given:  50 mg on 6/13/2017  3:20 PM                                * allopurinol 100 MG tablet   Commonly known as:  ZYLOPRIM   Take 1 tablet (100 mg) by mouth daily   Start taking on:  6/28/2017   Last time this was given:  50 mg on 6/13/2017  3:20 PM                                BENADRYL 25 MG capsule   Take 25 mg by mouth nightly as needed for itching or allergies   Generic drug:  diphenhydrAMINE                                BREO ELLIPTA 200-25 MCG/INH oral inhaler   Inhale 1 puff into the lungs daily   Last time this was given:  1 puff on 6/13/2017  8:03 AM   Generic drug:  fluticasone-vilanterol                                bumetanide 1 MG tablet   Commonly known as:  BUMEX   Take 1 tablet (1 mg) by mouth 2 times daily for 7 days                                celeXA 20 MG tablet   Take 20 mg by mouth daily   Last time this was given:  20 mg on 6/13/2017  7:52 AM   Generic drug:  citalopram                                colchicine 0.6 MG tablet   Take 1 tablet (0.6 mg) by mouth every other day   Last time this was given:  0.6 mg on 6/13/2017  2:16 PM                                cyanocobalamin 1000 MCG/ML injection   Commonly known as:  VITAMIN B12   Inject 1,000 mcg into the muscle every 30 days                                diclofenac 1 % Gel topical gel   Commonly known as:  VOLTAREN   Apply 2 g topically 4 times daily as needed for moderate pain                                docusate sodium 100 MG capsule   Commonly known as:  COLACE   Take 1 capsule (100 mg) by mouth 2 times daily                                GLUCOPHAGE 1000 MG tablet   Take 1,000 mg by mouth daily (with dinner)   Generic drug:  metFORMIN                                 hydrALAZINE 10 MG tablet   Commonly known as:  APRESOLINE   Take 1 tablet (10 mg) by mouth 3 times daily                                INCRUSE ELLIPTA 62.5 MCG/INH oral inhaler   Inhale 1 puff into the lungs daily   Last time this was given:  1 puff on 6/13/2017  8:03 AM   Generic drug:  umeclidinium                                ipratropium - albuterol 0.5 mg/2.5 mg/3 mL 0.5-2.5 (3) MG/3ML neb solution   Commonly known as:  DUONEB   Take 3 mLs by nebulization every 4 hours as needed for shortness of breath / dyspnea or wheezing   Last time this was given:  3 mLs on 6/13/2017  4:19 PM                                oxyCODONE 5 MG/5ML solution   Commonly known as:  ROXICODONE   Take 2.5 mLs (2.5 mg) by mouth every 4 hours as needed (elbow pain)   Last time this was given:  2.5 mg on 6/13/2017  3:20 PM                                predniSONE 20 MG tablet   Commonly known as:  DELTASONE   Take 2 tablets (40 mg) by mouth daily as needed   Start taking on:  7/5/2017                                sennosides 8.6 MG tablet   Commonly known as:  SENOKOT   Take 1 tablet by mouth daily                                SINGULAIR 10 MG tablet   Take 10 mg by mouth At Bedtime   Last time this was given:  10 mg on 6/12/2017  9:38 PM   Generic drug:  montelukast                                TESSALON PERLES 100 MG capsule   Take 200 mg by mouth 3 times daily as needed for cough   Generic drug:  benzonatate                                traMADol 50 MG tablet   Commonly known as:  ULTRAM   Take 100 mg by mouth every 6 hours as needed for moderate pain   Last time this was given:  100 mg on 6/12/2017  9:37 PM                                zinc sulfate 220 (50 ZN) MG capsule   Commonly known as:  ZINCATE   Take 220 mg by mouth 2 times daily   Last time this was given:  220 mg on 6/13/2017  7:52 AM                                * Notice:  This list has 2 medication(s) that are the same as other  medications prescribed for you. Read the directions carefully, and ask your doctor or other care provider to review them with you.

## 2017-06-11 NOTE — H&P
[facility]   Cardiology II Service / Advanced Heart Failure  History & Physical    Jim Willingham  : 1955  MRN # [id number]    ADMIT DATE: 2017    PCP: Chase Molina MD ([facility])    CHIEF COMPLAINT: cardiogenic shock, WALTER    HPI: Jim Willingham is a 62 year old gentleman with a long h/o NICM (EF20%, dilated LV, severe MR), asthma, COPD, CKD, DM2, CECILIA, MDD, presents in ambulatory cardiogenic shock, WALTER and accelerated LVAD implantation. Recently admitted less than 1 month ago for workup for advanced HF therapies. LVAD placement scheduled originally for .     About two weeks ago, pt developed a cough productive of green phlegm, felt terrible and was treated for acute bronchitis with a 10d course of levofloxacin. Pt felt better about 5d into abx course. Finished abx 3 days ago.    3d ago also saw Dr. Montgomery in clinic. SCr found to increased from baseline 1.5 to about 2.3. Home edmund/bumex/digoxin/losartan were stopped. Pt noted decreased urination following dc of the 4 meds. But did not change how much fluid he consumed. Had a decreased appetite for a few days last week. Also felt constipated. Pt was given bowel regimen by Dr. Montgomery, and felt better. Appetite recovered today. Of note, yesterday pt noted to have gained 3 lbs in 1 day. Dry weight around 257 lbs.     Otherwise denies headache, dizziness, cough, sore throat, chest pain, SOB at rest, abd pain, diarrhea, urination changes or new joint pains.     ROS:   12-pt ROS otherwise negative except as noted above    PMH:  Past Medical History:   Diagnosis Date     Benign essential hypertension 2017     Cardiomyopathy, unspecified (H) 2017     CKD (chronic kidney disease) stage 3, GFR 30-59 ml/min 2017     Depression 2017     Diabetes mellitus (H) 2017     H/O gastric bypass 2017     ICD (implantable cardioverter-defibrillator), biventricular, in situ 2017     NICM (nonischemic  cardiomyopathy) (H)/ EF 20% 5/11/2017    ECHO: LVEDd. 7.66 cm, Restrictive pattern , Severe mitral valve regurgitation     CECILIA (obstructive sleep apnea) 5/11/2017     Paroxysmal atrial fibrillation (H) 5/11/2017     Paroxysmal VT (H) 5/11/2017     Vitamin B12 deficiency (non anemic) 5/11/2017       PSH:  Past Surgical History:   Procedure Laterality Date     GI SURGERY  2003    [facility]     ORTHOPEDIC SURGERY  1994    right knee wired       MEDICATIONS:  Prior to Admission Medications   Prescriptions Last Dose Informant Patient Reported? Taking?   albuterol (ALBUTEROL) 108 (90 BASE) MCG/ACT Inhaler   Yes No   Sig: Inhale 2 puffs into the lungs every 4 hours as needed for shortness of breath / dyspnea or wheezing   benzonatate (TESSALON PERLES) 100 MG capsule   Yes No   Sig: Take 200 mg by mouth 3 times daily as needed for cough   bumetanide (BUMEX) 2 MG tablet   No No   Sig: Take 1 tablet (2 mg) by mouth 2 times daily   citalopram (CELEXA) 20 MG tablet   Yes No   Sig: Take 20 mg by mouth daily   cyanocobalamin (VITAMIN B12) 1000 MCG/ML injection   Yes No   Sig: Inject 1,000 mcg into the muscle every 30 days   diclofenac (VOLTAREN) 1 % GEL topical gel   No No   Sig: Apply 2 g topically 4 times daily as needed for moderate pain   digoxin (DIGOX) 125 MCG tablet   Yes No   Sig: Take 125 mcg by mouth daily   diphenhydrAMINE (BENADRYL) 25 MG capsule   Yes No   Sig: Take 25 mg by mouth nightly as needed for itching or allergies   docusate sodium (COLACE) 100 MG capsule 6/11/2017 at Unknown time  No Yes   Sig: Take 1 capsule (100 mg) by mouth 2 times daily   fluticasone-vilanterol (BREO ELLIPTA) 200-25 MCG/INH oral inhaler 6/11/2017 at Unknown time  Yes Yes   Sig: Inhale 1 puff into the lungs daily   hydrALAZINE (APRESOLINE) 10 MG tablet   No No   Sig: Take 1 tablet (10 mg) by mouth 3 times daily   ipratropium - albuterol 0.5 mg/2.5 mg/3 mL (DUONEB) 0.5-2.5 (3) MG/3ML neb solution   Yes No   Sig: Take 3 mLs by nebulization  "every 4 hours as needed for shortness of breath / dyspnea or wheezing   losartan (COZAAR) 50 MG tablet   No No   Sig: Take 0.5 tablets (25 mg) by mouth 2 times daily   metFORMIN (GLUCOPHAGE) 1000 MG tablet 6/11/2017 at Unknown time  Yes Yes   Sig: Take 1,000 mg by mouth daily (with dinner)   metoprolol (TOPROL XL) 100 MG 24 hr tablet 6/11/2017 at Unknown time  No Yes   Sig: Take 1 tablet (100 mg) by mouth 2 times daily   Patient taking differently: Take 100 mg by mouth 2 times daily    montelukast (SINGULAIR) 10 MG tablet   Yes No   Sig: Take 10 mg by mouth At Bedtime   sennosides (SENOKOT) 8.6 MG tablet 6/11/2017 at Unknown time  No Yes   Sig: Take 1 tablet by mouth daily   spironolactone (ALDACTONE) 25 MG tablet   Yes No   Sig: Take 25 mg by mouth 2 times daily   traMADol (ULTRAM) 50 MG tablet   Yes No   Sig: Take 100 mg by mouth every 6 hours as needed for moderate pain   umeclidinium (INCRUSE ELLIPTA) 62.5 MCG/INH oral inhaler 6/11/2017 at Unknown time  Yes Yes   Sig: Inhale 1 puff into the lungs daily   warfarin (COUMADIN) 5 MG tablet 6/11/2017 at Unknown time  Yes Yes   Sig: Take 5 mg by mouth See Admin Instructions 10 mg M,W,F, 7.5 mg all other days   zinc sulfate (ZINCATE) 220 (50 ZN) MG capsule   Yes No   Sig: Take 220 mg by mouth 2 times daily      Facility-Administered Medications: None        ALLERGIES:     Allergies   Allergen Reactions     Ace Inhibitors Cough     Sulfa Drugs Unknown       FAMILY HISTORY:  Father - \"silent MI\" as early as 40s, but only recently dx with heart failure, now aged 87  Mother - passed away from CVA, had about 9 CVAs total  2 brothers and 1 sister without significant cardiac dz    SOCIAL HISTORY:  Former respiratory therapist  Wife is also respiratory therapist  Lives at home with wife, adopted great granddaughter, and 29 yo son who is an   Tob: social use in past, quit 1994  EtOH: extensive use in past, up to 18-19 beers a day, quit in 2010  Marijuana: frequent " use in past, quit in 2010  Tried magic mushrooms and phencyclidine in past  Denies cocaine, heroin     PHYSICAL EXAM:  Temperature 97.8  F (36.6  C), temperature source Oral, resp. rate 18.  GENERAL: Appears alert and oriented times three.   HEENT: Eye symmetrical and free of discharge bilaterally. Mucous membranes moist and without lesions.  NECK: Supple and without lymphadenopathy. JVD 8-9 cm.   CV: S1/S2 heard without murmur   RESPIRATORY: Normal breath sounds, no wheezes or crackles noted  GI: Soft and mildly distended with normoactive bowel sounds present in all quadrants. No tenderness, rebound, guarding. No palpable organomegaly.   EXTREMITIES: 1+ peripheral edema. 2+ bilateral pedal pulses.   NEUROLOGIC: Alert and orientated x 3. CN II-XII grossly intact. No focal deficits.   MUSCULOSKELETAL: No joint swelling or tenderness.   SKIN: No jaundice. No rashes or lesions.     LABS:  BMP  Recent Labs  Lab 06/11/17  1315 06/08/17  0944    137   POTASSIUM 5.0 4.2   CHLORIDE 104 99   QAMAR 8.3* 9.1   CO2 28 27   BUN 48* 50*   CR 2.14* 2.36*   * 88     CBC  Recent Labs  Lab 06/11/17  1315   WBC 8.4   RBC 3.64*   HGB 10.8*   HCT 34.8*   MCV 96   MCH 29.7   MCHC 31.0*   RDW 13.8        INR  Recent Labs  Lab 06/11/17  1315 06/08/17  0944   INR 3.48* 4.20*     LFTs  Recent Labs  Lab 06/11/17  1315   ALKPHOS 69   AST 27   ALT 27   BILITOTAL 0.6   PROTTOTAL 6.8   ALBUMIN 3.5        IMAGING:    Last coronary angiogram and RHC May 10, 2017   Pressures      Phase:Baseline      AO :  70.00 mmHg / 49.00 mmHg ( 57.00 mmHg )  @ 10:39:59 AM            72.00 mmHg / 49.00 mmHg ( 58.00 mmHg )  @ 10:40:10 AM      LV :  83.00 mmHg / 16.00 mmHg / 23.00 mmHg  @ 10:46:50 AM      LV Pull back :  85.00 mmHg / 15.00 mmHg / 23.00 mmHg  @ 10:46:59 AM      AO Pull back :  86.00 mmHg / 51.00 mmHg ( 74.00 mmHg )  @ 10:47:06 AM      RA :  adam wave = v wave = mean = 15.00 mmHg  @ 11:06:43 AM      RV :  44.00 mmHg / 15.00 mmHg / 18.00  mmHg  @ 11:06:27 AM      PA :  41.00 mmHg / 20.00 mmHg ( 32.00 mmHg )  @ 10:59:05 AM      PCW :  a wave = v wave = mean = 32.00 mmHg  @ 11:00:00 AM    Valves      Phase:Baseline      AV :  0.00 mmHg  @ 10:22:09 AM      AV Mean Gradient:  13.70 mmHg  @ 10:22:09 AM      AV Valve Area:  1.27 cm2  @ 10:22:09 AM    Cardiac Output      Phase:Baseline      Thermo Cardiac Output:  4.77 l/min  @ 10:22:09 AM    Flow      Phase:Baseline      Qp :  4.77 l/min  @ 10:22:09 AM      Qs :  4.77 l/min  @ 10:22:09 AM       ASSESSMENT & PLAN:  62M h/o NICM (EF 20%) s/p Bi-V ICD, severe MR, VT, paroxsymal afib, DM, HTN, CKD, CECILIA, MDD, admitted for increasing SCr and accelerated LVAD placement. Found to be in ambulatory cardiogenic shock. Holding HF meds, dopamine prn, PICC soon, RHC when able.    Ambulatory cardiogenic shock  - MAPs upon presentation around 65 mmHg  - holding PTA metoprolol and hydralazine  - unclear etiology of acute decompensation, although pt is ambulatory and does not appear in distress at rest  - likely 2/2 NICM, recent infx and deconditioning  - consider low dose dopamine via peripheral IV if MAPs dropping below 60 mmHg  - PICC placement 6/12 for central venous sampling  - right heart cath for hemodynamics when INR in safe range    NICM (EF20%)  Combined Systolic Diastolic Heart Failure  - etiology likely 2/2 to chronic alcoholism vs amyloidosis vs ICM  - clean cor angio in past, unlikely ICM  - not had w/u for amyloidosis, but without significant family hx of cardiomyopathy  - significant EtOH strongly suggests etiology of heart failure  - LVAD w/u began last admission less then 1 month ago, this admission accelerating LVAD implant  - pending right heart cath studies    Supratherapeutic INR  - likely from recent levofloxacin for bronchitis vs recent anorexia vs declining liver fxn  - LFTs on admission wnl  - pt had poor appetite past few days, but now regaining appetite  - very well characterized interaction  between levofloxacin and warfarin  - holding warfarin, monitor INR  - if no bleeding, will not urgently reverse INR    WALTER on CKD  - SCr increased from baseline of around 1.5 to 2.1, meeting KDIGO criteria for WALTER  - unclear etiology given no changes in PO fluid intake  - possibly related to recent bronchitis and levofloxacin course  - already decreasing since lab draw 3 days ago  - will refrain from urine studies of etiology given ongoing improvement  - trend SCr      Chronic Conditions  H/o of ventricular tachycardia: [employer] ICD interrogated, no events since 3 weeks ago, continue amiodarone 100 mg qday  Paroxsymal Afib: continue amiodarone 100 mg qday  Asthma, COPD: continue PTA umeclidinium, singulair, PRN albuterol  - needs repeat PFTs at some point  DM: holding oral antiglycemics, SSI ordered   CECILIA: continue CPAP on home settings  Depression: continue home citalopram 20 mg qday    FEN: 2 gram sodium diet  PROPHY: mechanical, already on warfarin  LINES:  peripheral IV  DISPO:  inpt admission for ambulatory cardiogenic shock and LVAD placement  CODE STATUS:  full code    Patient discussed with attending physician, Dr. Precious Bautista.       Martínez Locke MD, PhD  Internal Medicine PGY-1  [phone]  6/11/2017

## 2017-06-12 ENCOUNTER — APPOINTMENT (OUTPATIENT)
Dept: CARDIOLOGY | Facility: CLINIC | Age: 62
DRG: 314 | End: 2017-06-12
Attending: INTERNAL MEDICINE
Payer: COMMERCIAL

## 2017-06-12 LAB
ANION GAP SERPL CALCULATED.3IONS-SCNC: 7 MMOL/L (ref 3–14)
BUN SERPL-MCNC: 48 MG/DL (ref 7–30)
CALCIUM SERPL-MCNC: 8.6 MG/DL (ref 8.5–10.1)
CHLORIDE SERPL-SCNC: 103 MMOL/L (ref 94–109)
CO2 SERPL-SCNC: 25 MMOL/L (ref 20–32)
CREAT SERPL-MCNC: 1.89 MG/DL (ref 0.66–1.25)
ERYTHROCYTE [DISTWIDTH] IN BLOOD BY AUTOMATED COUNT: 13.8 % (ref 10–15)
GFR SERPL CREATININE-BSD FRML MDRD: 37 ML/MIN/1.7M2
GLUCOSE BLDC GLUCOMTR-MCNC: 108 MG/DL (ref 70–99)
GLUCOSE BLDC GLUCOMTR-MCNC: 115 MG/DL (ref 70–99)
GLUCOSE BLDC GLUCOMTR-MCNC: 86 MG/DL (ref 70–99)
GLUCOSE SERPL-MCNC: 93 MG/DL (ref 70–99)
HCT VFR BLD AUTO: 34.6 % (ref 40–53)
HGB BLD-MCNC: 11.1 G/DL (ref 13.3–17.7)
INR PPP: 3.06 (ref 0.86–1.14)
INTERPRETATION ECG - MUSE: NORMAL
MAGNESIUM SERPL-MCNC: 2.2 MG/DL (ref 1.6–2.3)
MCH RBC QN AUTO: 30 PG (ref 26.5–33)
MCHC RBC AUTO-ENTMCNC: 32.1 G/DL (ref 31.5–36.5)
MCV RBC AUTO: 94 FL (ref 78–100)
PLATELET # BLD AUTO: 201 10E9/L (ref 150–450)
POTASSIUM SERPL-SCNC: 4.6 MMOL/L (ref 3.4–5.3)
RBC # BLD AUTO: 3.7 10E12/L (ref 4.4–5.9)
SODIUM SERPL-SCNC: 136 MMOL/L (ref 133–144)
WBC # BLD AUTO: 9.1 10E9/L (ref 4–11)

## 2017-06-12 PROCEDURE — 40000275 ZZH STATISTIC RCP TIME EA 10 MIN

## 2017-06-12 PROCEDURE — 25500064 ZZH RX 255 OP 636: Performed by: INTERNAL MEDICINE

## 2017-06-12 PROCEDURE — 36415 COLL VENOUS BLD VENIPUNCTURE: CPT | Performed by: STUDENT IN AN ORGANIZED HEALTH CARE EDUCATION/TRAINING PROGRAM

## 2017-06-12 PROCEDURE — 25000132 ZZH RX MED GY IP 250 OP 250 PS 637: Performed by: STUDENT IN AN ORGANIZED HEALTH CARE EDUCATION/TRAINING PROGRAM

## 2017-06-12 PROCEDURE — 25000132 ZZH RX MED GY IP 250 OP 250 PS 637: Performed by: INTERNAL MEDICINE

## 2017-06-12 PROCEDURE — 93306 TTE W/DOPPLER COMPLETE: CPT | Mod: 26 | Performed by: INTERNAL MEDICINE

## 2017-06-12 PROCEDURE — 85610 PROTHROMBIN TIME: CPT | Performed by: STUDENT IN AN ORGANIZED HEALTH CARE EDUCATION/TRAINING PROGRAM

## 2017-06-12 PROCEDURE — 00000146 ZZHCL STATISTIC GLUCOSE BY METER IP

## 2017-06-12 PROCEDURE — 40000264 ECHO COMPLETE WITH OPTISON

## 2017-06-12 PROCEDURE — 83735 ASSAY OF MAGNESIUM: CPT | Performed by: STUDENT IN AN ORGANIZED HEALTH CARE EDUCATION/TRAINING PROGRAM

## 2017-06-12 PROCEDURE — 25000125 ZZHC RX 250: Performed by: STUDENT IN AN ORGANIZED HEALTH CARE EDUCATION/TRAINING PROGRAM

## 2017-06-12 PROCEDURE — 80048 BASIC METABOLIC PNL TOTAL CA: CPT | Performed by: STUDENT IN AN ORGANIZED HEALTH CARE EDUCATION/TRAINING PROGRAM

## 2017-06-12 PROCEDURE — 85027 COMPLETE CBC AUTOMATED: CPT | Performed by: STUDENT IN AN ORGANIZED HEALTH CARE EDUCATION/TRAINING PROGRAM

## 2017-06-12 PROCEDURE — 94640 AIRWAY INHALATION TREATMENT: CPT | Mod: 76

## 2017-06-12 PROCEDURE — 25000132 ZZH RX MED GY IP 250 OP 250 PS 637

## 2017-06-12 PROCEDURE — 20000004 ZZH R&B ICU UMMC

## 2017-06-12 RX ORDER — OXYCODONE HCL 5 MG/5 ML
SOLUTION, ORAL ORAL
Status: COMPLETED
Start: 2017-06-12 | End: 2017-06-12

## 2017-06-12 RX ORDER — OXYCODONE HCL 5 MG/5 ML
2.5 SOLUTION, ORAL ORAL EVERY 4 HOURS PRN
Status: DISCONTINUED | OUTPATIENT
Start: 2017-06-12 | End: 2017-06-13 | Stop reason: HOSPADM

## 2017-06-12 RX ORDER — AMOXICILLIN 250 MG
1-2 CAPSULE ORAL 2 TIMES DAILY
Status: DISCONTINUED | OUTPATIENT
Start: 2017-06-12 | End: 2017-06-13 | Stop reason: HOSPADM

## 2017-06-12 RX ORDER — ANALGESIC BALM 1.74; 4.06 G/29G; G/29G
OINTMENT TOPICAL EVERY 6 HOURS SCHEDULED
Status: DISCONTINUED | OUTPATIENT
Start: 2017-06-12 | End: 2017-06-13 | Stop reason: HOSPADM

## 2017-06-12 RX ORDER — ACETAMINOPHEN 325 MG/1
650 TABLET ORAL EVERY 6 HOURS PRN
Status: DISCONTINUED | OUTPATIENT
Start: 2017-06-12 | End: 2017-06-13 | Stop reason: HOSPADM

## 2017-06-12 RX ADMIN — MONTELUKAST SODIUM 10 MG: 10 TABLET, FILM COATED ORAL at 21:38

## 2017-06-12 RX ADMIN — Medication 220 MG: at 21:38

## 2017-06-12 RX ADMIN — OXYCODONE HYDROCHLORIDE: 5 SOLUTION ORAL at 21:39

## 2017-06-12 RX ADMIN — TRAMADOL HYDROCHLORIDE 100 MG: 50 TABLET, COATED ORAL at 21:37

## 2017-06-12 RX ADMIN — Medication 220 MG: at 08:47

## 2017-06-12 RX ADMIN — ACETAMINOPHEN 650 MG: 325 TABLET, FILM COATED ORAL at 03:54

## 2017-06-12 RX ADMIN — OXYCODONE HYDROCHLORIDE 2.5 MG: 5 SOLUTION ORAL at 17:07

## 2017-06-12 RX ADMIN — IPRATROPIUM BROMIDE AND ALBUTEROL SULFATE 3 ML: .5; 3 SOLUTION RESPIRATORY (INHALATION) at 07:57

## 2017-06-12 RX ADMIN — IPRATROPIUM BROMIDE AND ALBUTEROL SULFATE 3 ML: .5; 3 SOLUTION RESPIRATORY (INHALATION) at 12:48

## 2017-06-12 RX ADMIN — TRAMADOL HYDROCHLORIDE 100 MG: 50 TABLET, COATED ORAL at 03:43

## 2017-06-12 RX ADMIN — FLUTICASONE FUROATE AND VILANTEROL TRIFENATATE 1 PUFF: 200; 25 POWDER RESPIRATORY (INHALATION) at 08:46

## 2017-06-12 RX ADMIN — UMECLIDINIUM 1 PUFF: 62.5 AEROSOL, POWDER ORAL at 08:46

## 2017-06-12 RX ADMIN — HUMAN ALBUMIN MICROSPHERES AND PERFLUTREN 5 ML: 10; .22 INJECTION, SOLUTION INTRAVENOUS at 16:45

## 2017-06-12 RX ADMIN — IPRATROPIUM BROMIDE AND ALBUTEROL SULFATE 3 ML: .5; 3 SOLUTION RESPIRATORY (INHALATION) at 21:20

## 2017-06-12 RX ADMIN — CITALOPRAM HYDROBROMIDE 20 MG: 20 TABLET ORAL at 08:47

## 2017-06-12 RX ADMIN — IPRATROPIUM BROMIDE AND ALBUTEROL SULFATE 3 ML: .5; 3 SOLUTION RESPIRATORY (INHALATION) at 16:55

## 2017-06-12 RX ADMIN — OXYCODONE HYDROCHLORIDE 2.5 MG: 5 SOLUTION ORAL at 21:40

## 2017-06-12 RX ADMIN — AMIODARONE HYDROCHLORIDE 100 MG: 100 TABLET ORAL at 08:47

## 2017-06-12 RX ADMIN — ANALGESIC BALM: 1.74; 4.06 OINTMENT TOPICAL at 18:16

## 2017-06-12 ASSESSMENT — PAIN DESCRIPTION - DESCRIPTORS
DESCRIPTORS: ACHING

## 2017-06-12 NOTE — PROGRESS NOTES
Pt alert and oriented. Remains SR, Dobutamine off since 0500 and MAP >65 throughout day. On room air, stable. Voiding without difficulty to urinal. Rheumatology consult placed today to evaluate Lt elbow swelling/pain. Currently pain controlled with Oxycodone. Lt upper arm PIV bruised and painful- d/c'd this evening. Unable to complete heart cath today- plan to do tomorrow- pt and wife notified. EPIC downtime from [phone], see downtime records in pt chart.

## 2017-06-12 NOTE — PROGRESS NOTES
Cardiology Progress Note    Events and interval changes in past 24 hours:   BP better. No symptoms      OBJECTIVE FINDINGS:  Temp: 98.6  F (37  C) Temp  Min: 97.8  F (36.6  C)  Max: 98.6  F (37  C)  Resp: 18 Resp  Min: 16  Max: 18  SpO2: 99 % SpO2  Min: 94 %  Max: 100 %    No Data Recorded  Heart Rate: 66 Heart Rate  Min: 63  Max: 76  BP: 97/62 Systolic (24hrs), Av , Min:80 , Max:107   Diastolic (24hrs), Av, Min:56, Max:76    Gen: Patient is AOx3, in NAD. Appears comfortable.    CV: JVP 7cm, S1 & S2, systolic murmur LLB and apex  Lungs: CTAB  Abd: soft, nontender    Intake/Output Summary (Last 24 hours) at 17 0542  Last data filed at 17 0356   Gross per 24 hour   Intake              990 ml   Output              775 ml   Net              215 ml       - MEDICATION INSTRUCTIONS -       DOPamine 2 mcg/kg/min (17 1830)       citalopram  20 mg Oral Daily     fluticasone-vilanterol  1 puff Inhalation Daily     montelukast  10 mg Oral At Bedtime     umeclidinium  1 puff Inhalation Daily     zinc sulfate  220 mg Oral BID     insulin aspart  1-7 Units Subcutaneous TID AC     insulin aspart  1-5 Units Subcutaneous At Bedtime     amiodarone  100 mg Oral Daily     ipratropium - albuterol 0.5 mg/2.5 mg/3 mL  3 mL Nebulization Q4H While awake     acetaminophen, naloxone, - MEDICATION INSTRUCTIONS -, senna-docusate, polyethylene glycol, benzonatate, diphenhydrAMINE, traMADol, glucose **OR** dextrose **OR** glucagon, albuterol, lidocaine 4%     CMP  Recent Labs  Lab 17  0446 17  1315 17  0944    137 137   POTASSIUM 4.6 5.0 4.2   CHLORIDE 103 104 99   CO2 25 28 27   ANIONGAP 7 4 11   GLC 93 103* 88   BUN 48* 48* 50*   CR 1.89* 2.14* 2.36*   GFRESTIMATED 37* 32* 28*   GFRESTBLACK 44* 38* 34*   QAMAR 8.6 8.3* 9.1   MAG 2.2 2.0  --    PHOS  --  3.3  --    PROTTOTAL  --  6.8  --    ALBUMIN  --  3.5  --    BILITOTAL  --  0.6  --    ALKPHOS  --  69  --    AST  --  27  --    ALT  --  27  --       CBC  Recent Labs  Lab 06/12/17  0446 06/11/17  1315   WBC 9.1 8.4   RBC 3.70* 3.64*   HGB 11.1* 10.8*   HCT 34.6* 34.8*   MCV 94 96   MCH 30.0 29.7   MCHC 32.1 31.0*   RDW 13.8 13.8    204     INR  Recent Labs  Lab 06/12/17  0446 06/11/17  1315 06/08/17  0944   INR 3.06* 3.48* 4.20*     Arterial Blood GasNo lab results found in last 7 days.    ECG v paed rhythm    Echo outside 5/1  1. Severely increased left ventricular size. LVEDd is 7.66 cm   2. LV function is severely reduced. The visually estimated ejection fraction is 20%.   3. Septal motion consistent with conduction delay.   4. Restrictive pattern of diastolic filling consistent with severe diastolic dysfunction.   5. Severely dilated left atrium.   6. Severe mitral valve regurgitation.   7. Moderate pulmonary hypertension. PAP 48 mmHG plus RAP, or approximately 55-58 mmHG.    CXR: not avaialble    ASSESSMENT:    62 year-old man with advanced chronic systolic HF due to NICM, s/p CRT-D, severe functional MR, pAF, CKD, DM, CECILIA who has been declining and recently underwent expedited LVAD work up with planned LVAD implantation in 1 week  with hypotension and WALTER that did not respond to reduction in ARB dose.    # Ambulatory cardiogenic shock  # WALTER on CKD from above  # supratherapeutic INR    # NICM- stage D, Class [id number] medical management, scheduled for LVAD as destination therapy vs bridge to transplant blood group (longest wait for transplant) and high BMI  # severe functional MR  # paroxysmal atrial fibrillation, [id number], CHF-2  # Hx of VT?    #CKD  # Asthma/COPD  # CECILIA  # DM  # left elbow swelling/pain    PLAN:  - Dopamine has been off since morning. MAPs stable. Likely transfer to floor tomorrow  - Monitor kidney function  - Continue amiodarone (for Afib/VT). Restart digoxin if renal function better  - Warfarin on hold. LVAD on Monday  - Hold home digoxin, losartan, hydralazine, toprol xl, spironolactone  -not on diuretic at  home  - Rheumatology consult for left elbow swelling and tenderness      Will staff with Dr. Lul Gómez  General cards fellow, PGY4  Pager [phone]  I personally provided care for this patient, reviewed chart, discussed course with patient, housestaff and consulting physicians.  I answered all questions.    Duran Mccarty M.D.  Division of Cardiology  Department of Medicine

## 2017-06-12 NOTE — PLAN OF CARE
Problem: Goal Outcome Summary  Goal: Goal Outcome Summary  Outcome: Improving  Pt hemodynamically stable this shift. Continues on dopamine gtt at 2mcg/kg/min to keep MAPs >65. C/o pain to L elbow throughout shift; tramadol and APAP provided relief and allowed pt to sleep. Ambulates independently in room. NPO since midNOC for possible R heart cath. Continuing to monitor.    Problem: Cardiac: Heart Failure (Adult)  Goal: Signs and Symptoms of Listed Potential Problems Will be Absent or Manageable (Cardiac: Heart Failure)  Signs and symptoms of listed potential problems will be absent or manageable by discharge/transition of care (reference Cardiac: Heart Failure (Adult) CPG).     06/12/17 0400   Cardiac: Heart Failure   Problems Assessed (Heart Failure) all   Problems Present (Heart Failure) cardiac pump dysfunction

## 2017-06-13 VITALS
BODY MASS INDEX: 39.72 KG/M2 | OXYGEN SATURATION: 98 % | SYSTOLIC BLOOD PRESSURE: 98 MMHG | RESPIRATION RATE: 20 BRPM | DIASTOLIC BLOOD PRESSURE: 63 MMHG | WEIGHT: 261.25 LBS | TEMPERATURE: 98.2 F

## 2017-06-13 LAB
ANION GAP SERPL CALCULATED.3IONS-SCNC: 6 MMOL/L (ref 3–14)
BUN SERPL-MCNC: 35 MG/DL (ref 7–30)
CALCIUM SERPL-MCNC: 8.6 MG/DL (ref 8.5–10.1)
CHLORIDE SERPL-SCNC: 106 MMOL/L (ref 94–109)
CO2 SERPL-SCNC: 24 MMOL/L (ref 20–32)
COPATH REPORT: NORMAL
CREAT SERPL-MCNC: 1.33 MG/DL (ref 0.66–1.25)
CRYSTALS SNV MICRO: NORMAL
ERYTHROCYTE [DISTWIDTH] IN BLOOD BY AUTOMATED COUNT: 13.6 % (ref 10–15)
GFR SERPL CREATININE-BSD FRML MDRD: 55 ML/MIN/1.7M2
GLUCOSE BLDC GLUCOMTR-MCNC: 105 MG/DL (ref 70–99)
GLUCOSE BLDC GLUCOMTR-MCNC: 106 MG/DL (ref 70–99)
GLUCOSE SERPL-MCNC: 97 MG/DL (ref 70–99)
HCT VFR BLD AUTO: 31.9 % (ref 40–53)
HGB BLD-MCNC: 10 G/DL (ref 13.3–17.7)
INR PPP: 2.25 (ref 0.86–1.14)
MAGNESIUM SERPL-MCNC: 2.1 MG/DL (ref 1.6–2.3)
MCH RBC QN AUTO: 29.5 PG (ref 26.5–33)
MCHC RBC AUTO-ENTMCNC: 31.3 G/DL (ref 31.5–36.5)
MCV RBC AUTO: 94 FL (ref 78–100)
PLATELET # BLD AUTO: 198 10E9/L (ref 150–450)
POTASSIUM SERPL-SCNC: 4.2 MMOL/L (ref 3.4–5.3)
RBC # BLD AUTO: 3.39 10E12/L (ref 4.4–5.9)
SODIUM SERPL-SCNC: 136 MMOL/L (ref 133–144)
URATE SERPL-MCNC: 7.6 MG/DL (ref 3.5–7.2)
WBC # BLD AUTO: 8.3 10E9/L (ref 4–11)

## 2017-06-13 PROCEDURE — 89060 EXAM SYNOVIAL FLUID CRYSTALS: CPT | Performed by: STUDENT IN AN ORGANIZED HEALTH CARE EDUCATION/TRAINING PROGRAM

## 2017-06-13 PROCEDURE — S0020 INJECTION, BUPIVICAINE HYDRO: HCPCS | Performed by: STUDENT IN AN ORGANIZED HEALTH CARE EDUCATION/TRAINING PROGRAM

## 2017-06-13 PROCEDURE — 94640 AIRWAY INHALATION TREATMENT: CPT

## 2017-06-13 PROCEDURE — 85027 COMPLETE CBC AUTOMATED: CPT | Performed by: STUDENT IN AN ORGANIZED HEALTH CARE EDUCATION/TRAINING PROGRAM

## 2017-06-13 PROCEDURE — 25000125 ZZHC RX 250: Performed by: STUDENT IN AN ORGANIZED HEALTH CARE EDUCATION/TRAINING PROGRAM

## 2017-06-13 PROCEDURE — 94640 AIRWAY INHALATION TREATMENT: CPT | Mod: 76

## 2017-06-13 PROCEDURE — 3E0U33Z INTRODUCTION OF ANTI-INFLAMMATORY INTO JOINTS, PERCUTANEOUS APPROACH: ICD-10-PCS | Performed by: INTERNAL MEDICINE

## 2017-06-13 PROCEDURE — 85610 PROTHROMBIN TIME: CPT | Performed by: STUDENT IN AN ORGANIZED HEALTH CARE EDUCATION/TRAINING PROGRAM

## 2017-06-13 PROCEDURE — 84550 ASSAY OF BLOOD/URIC ACID: CPT | Performed by: STUDENT IN AN ORGANIZED HEALTH CARE EDUCATION/TRAINING PROGRAM

## 2017-06-13 PROCEDURE — 0R9M3ZX DRAINAGE OF LEFT ELBOW JOINT, PERCUTANEOUS APPROACH, DIAGNOSTIC: ICD-10-PCS | Performed by: INTERNAL MEDICINE

## 2017-06-13 PROCEDURE — 25000128 H RX IP 250 OP 636: Performed by: STUDENT IN AN ORGANIZED HEALTH CARE EDUCATION/TRAINING PROGRAM

## 2017-06-13 PROCEDURE — 00000146 ZZHCL STATISTIC GLUCOSE BY METER IP

## 2017-06-13 PROCEDURE — 25000132 ZZH RX MED GY IP 250 OP 250 PS 637: Performed by: INTERNAL MEDICINE

## 2017-06-13 PROCEDURE — 83735 ASSAY OF MAGNESIUM: CPT | Performed by: STUDENT IN AN ORGANIZED HEALTH CARE EDUCATION/TRAINING PROGRAM

## 2017-06-13 PROCEDURE — 25000132 ZZH RX MED GY IP 250 OP 250 PS 637: Performed by: STUDENT IN AN ORGANIZED HEALTH CARE EDUCATION/TRAINING PROGRAM

## 2017-06-13 PROCEDURE — 36415 COLL VENOUS BLD VENIPUNCTURE: CPT | Performed by: STUDENT IN AN ORGANIZED HEALTH CARE EDUCATION/TRAINING PROGRAM

## 2017-06-13 PROCEDURE — 80048 BASIC METABOLIC PNL TOTAL CA: CPT | Performed by: STUDENT IN AN ORGANIZED HEALTH CARE EDUCATION/TRAINING PROGRAM

## 2017-06-13 PROCEDURE — 40000275 ZZH STATISTIC RCP TIME EA 10 MIN

## 2017-06-13 RX ORDER — OXYCODONE HCL 5 MG/5 ML
2.5 SOLUTION, ORAL ORAL EVERY 4 HOURS PRN
Qty: 90 ML | Refills: 0 | Status: ON HOLD | OUTPATIENT
Start: 2017-06-13 | End: 2017-06-30

## 2017-06-13 RX ORDER — ALLOPURINOL 100 MG/1
50 TABLET ORAL DAILY
Qty: 7 TABLET | Refills: 0 | Status: ON HOLD | OUTPATIENT
Start: 2017-06-13 | End: 2017-06-30

## 2017-06-13 RX ORDER — ALLOPURINOL 100 MG/1
100 TABLET ORAL DAILY
Qty: 30 TABLET | Refills: 1 | Status: SHIPPED | OUTPATIENT
Start: 2017-06-28 | End: 2017-06-15

## 2017-06-13 RX ORDER — COLCHICINE 0.6 MG/1
0.6 TABLET ORAL EVERY OTHER DAY
Qty: 30 TABLET | Refills: 1 | Status: ON HOLD | OUTPATIENT
Start: 2017-06-13 | End: 2017-06-30

## 2017-06-13 RX ORDER — BUPIVACAINE HYDROCHLORIDE 5 MG/ML
2 INJECTION, SOLUTION PERINEURAL ONCE
Status: COMPLETED | OUTPATIENT
Start: 2017-06-13 | End: 2017-06-13

## 2017-06-13 RX ORDER — PREDNISONE 20 MG/1
40 TABLET ORAL DAILY PRN
Qty: 10 TABLET | Refills: 1 | Status: SHIPPED | OUTPATIENT
Start: 2017-07-05 | End: 2017-06-15

## 2017-06-13 RX ORDER — METHYLPREDNISOLONE ACETATE 40 MG/ML
40 INJECTION, SUSPENSION INTRA-ARTICULAR; INTRALESIONAL; INTRAMUSCULAR; SOFT TISSUE ONCE
Status: COMPLETED | OUTPATIENT
Start: 2017-06-13 | End: 2017-06-13

## 2017-06-13 RX ORDER — COLCHICINE 0.6 MG/1
0.6 TABLET ORAL DAILY
Qty: 30 TABLET | Refills: 1 | Status: SHIPPED | OUTPATIENT
Start: 2017-06-13 | End: 2017-06-13

## 2017-06-13 RX ORDER — BUMETANIDE 1 MG/1
1 TABLET ORAL 2 TIMES DAILY
Qty: 14 TABLET | Refills: 0 | Status: ON HOLD | OUTPATIENT
Start: 2017-06-13 | End: 2017-06-30

## 2017-06-13 RX ORDER — OXYCODONE HCL 5 MG/5 ML
2.5 SOLUTION, ORAL ORAL EVERY 4 HOURS PRN
Qty: 90 ML | Refills: 0 | Status: SHIPPED | OUTPATIENT
Start: 2017-06-13 | End: 2017-06-13

## 2017-06-13 RX ORDER — ALLOPURINOL 100 MG/1
50 TABLET ORAL DAILY
Status: DISCONTINUED | OUTPATIENT
Start: 2017-06-13 | End: 2017-06-13 | Stop reason: HOSPADM

## 2017-06-13 RX ORDER — COLCHICINE 0.6 MG/1
0.6 TABLET ORAL DAILY
Status: DISCONTINUED | OUTPATIENT
Start: 2017-06-13 | End: 2017-06-13 | Stop reason: HOSPADM

## 2017-06-13 RX ADMIN — COLCHICINE 0.6 MG: 0.6 TABLET, FILM COATED ORAL at 14:16

## 2017-06-13 RX ADMIN — UMECLIDINIUM 1 PUFF: 62.5 AEROSOL, POWDER ORAL at 08:03

## 2017-06-13 RX ADMIN — ACETAMINOPHEN 650 MG: 325 TABLET, FILM COATED ORAL at 15:20

## 2017-06-13 RX ADMIN — ACETAMINOPHEN 650 MG: 325 TABLET, FILM COATED ORAL at 07:53

## 2017-06-13 RX ADMIN — ACETAMINOPHEN 650 MG: 325 TABLET, FILM COATED ORAL at 03:08

## 2017-06-13 RX ADMIN — OXYCODONE HYDROCHLORIDE 2.5 MG: 5 SOLUTION ORAL at 03:08

## 2017-06-13 RX ADMIN — BUPIVACAINE HYDROCHLORIDE 15 MG: 5 INJECTION, SOLUTION PERINEURAL at 15:56

## 2017-06-13 RX ADMIN — ALLOPURINOL 50 MG: 100 TABLET ORAL at 15:20

## 2017-06-13 RX ADMIN — OXYCODONE HYDROCHLORIDE 2.5 MG: 5 SOLUTION ORAL at 07:54

## 2017-06-13 RX ADMIN — Medication 220 MG: at 07:52

## 2017-06-13 RX ADMIN — FLUTICASONE FUROATE AND VILANTEROL TRIFENATATE 1 PUFF: 200; 25 POWDER RESPIRATORY (INHALATION) at 08:03

## 2017-06-13 RX ADMIN — METHYLPREDNISOLONE ACETATE 40 MG: 40 INJECTION, SUSPENSION INTRA-ARTICULAR; INTRALESIONAL; INTRAMUSCULAR; SOFT TISSUE at 15:58

## 2017-06-13 RX ADMIN — IPRATROPIUM BROMIDE AND ALBUTEROL SULFATE 3 ML: .5; 3 SOLUTION RESPIRATORY (INHALATION) at 09:07

## 2017-06-13 RX ADMIN — OXYCODONE HYDROCHLORIDE 2.5 MG: 5 SOLUTION ORAL at 15:20

## 2017-06-13 RX ADMIN — IPRATROPIUM BROMIDE AND ALBUTEROL SULFATE 3 ML: .5; 3 SOLUTION RESPIRATORY (INHALATION) at 00:38

## 2017-06-13 RX ADMIN — CITALOPRAM HYDROBROMIDE 20 MG: 20 TABLET ORAL at 07:52

## 2017-06-13 RX ADMIN — IPRATROPIUM BROMIDE AND ALBUTEROL SULFATE 3 ML: .5; 3 SOLUTION RESPIRATORY (INHALATION) at 13:16

## 2017-06-13 RX ADMIN — AMIODARONE HYDROCHLORIDE 100 MG: 100 TABLET ORAL at 07:53

## 2017-06-13 RX ADMIN — IPRATROPIUM BROMIDE AND ALBUTEROL SULFATE 3 ML: .5; 3 SOLUTION RESPIRATORY (INHALATION) at 16:19

## 2017-06-13 ASSESSMENT — PAIN DESCRIPTION - DESCRIPTORS: DESCRIPTORS: ACHING

## 2017-06-13 NOTE — DISCHARGE SUMMARY
[facility]   Cardiology II Service / Advanced Heart Failure  Discharge Summary     Jim Willingham MRN# [id number]   YOB: 1955 Age: 62 year old     DATE OF ADMISSION:  6/11/2017  DATE OF DISCHARGE: 6/13/2017  ADMITTING PROVIDER: Precious Bautista MD  DISCHARGE PROVIDER: Precious Bautista  PRIMARY PROVIDER: No Ref-Primary, Physician    ADMIT DIAGNOSES:   1. Ambulatory cardiogenic shock  2. Acute kidney injury  3. Nonischemic cardiomyopathy with reduced ejection fraction  4. Atrial fibrillation  5. Asthma  6. Chronic obstructive pulmonary disease  7. Diabetes mellitus  8. Obstructive sleep apnea  9. Depression    DISCHARGE DIAGNOSES:   1. Nonischemic cardiomyopathy with reduced ejection fraction  2. Acute gout  3. Atrial fibrillation  4. Asthma  5. Chronic obstructive pulmonary disease  6. Diabetes mellitus  7. Obstructive sleep apnea  8. Depression    HPI: Please see the detailed H&P from 6/11/2017. Briefly, 62 year-old man with advanced chronic systolic HF due to NICM who has been declining and recently underwent expedited LVAD work up with planned LVAD implantation in 1 week  with hypotension and WALTER that did not respond to reduction in ARB dose. Does feel better since last clinic visit, but appears cold and dry. WALTER likely due to reduced perfusion pressure. Holding beta blocker and hydralazine. No clear precipitant for hypotension including no arrhythmias on interrogation. Started low dose dopamine with goal MAPs 70-80 and to optimize renal function.     PHYSICAL EXAM:  Blood pressure 106/66, temperature 98.2  F (36.8  C), temperature source Oral, resp. rate 20, weight 118.5 kg (261 lb 3.9 oz), SpO2 98 %.  GENERAL: Appears alert and oriented times three.   HEENT: Eye symmetrical and free of discharge bilaterally. Mucous membranes moist and without lesions.  NECK: Supple and without lymphadenopathy. JVP 8-9 cm.   CV: S1/S2 heard without murmur  RESPIRATORY: Respirations regular, even, and  unlabored. Normal breath sounds.   GI: Soft and non distended with normoactive bowel sounds present in all quadrants. No tenderness, rebound, guarding. No organomegaly.   EXTREMITIES: 1+  peripheral lower extremity edema. 2+ bilateral pedal pulses.   NEUROLOGIC: Alert and orientated x 3. CN II-XII grossly intact. No focal deficits.   MUSCULOSKELETAL: L elbow warm, moderately tender, mildly swollen, full range of motion preserved  SKIN: No jaundice. No rashes or lesions.     LABS:   Last CBC:   Recent Labs   Lab Test  06/13/17   0450   WBC  8.3   RBC  3.39*   HGB  10.0*   HCT  31.9*   MCV  94   MCH  29.5   MCHC  31.3*   RDW  13.6   PLT  198       Last CMP:  Recent Labs   Lab Test  06/13/17 0450 06/11/17   1315   NA  136  137   POTASSIUM  4.2  5.0   CHLORIDE  106  104   QAMAR  8.6  8.3*   CO2  24  28   BUN  35*  48*   CR  1.33*  2.14*   GLC  97  103*   AST   --   27   ALT   --   27   BILITOTAL   --   0.6   ALBUMIN   --   3.5   PROTTOTAL   --   6.8   ALKPHOS   --   69    < > = values in this interval not displayed.     IMAGING: none    PROCEDURES:  none    CONSULTATIONS:  none    HOSPITAL COURSE BY PROBLEM:     # Ambulatory cardiogenic shock  # WALTER on CKD  # NICM- stage D, Class [id number] medical management, scheduled for LVAD as destination therapy vs bridge to transplant blood group (longest wait for transplant) and high BMI  # severe functional MR  # paroxysmal atrial fibrillation, [id number], CHF-2  # Hx of VT  # Asthma/COPD  # CECILIA  # DM  # Acute gout attack (left elbow)     PLAN:  - Pt placed on dopamine briefly on day of admission, MAPs improved, doparmine turned off on second hospital day   - Creatinine decreased to normal range on day of discharge, suggesting recovery of renal fxn   - Plan remains for LVAD implantation on Monday, 6/19/2017   - Continued amiodarone (for Afib/VT)  - Held home digoxin, losartan, toprol xl, spironolactone  - Restarted bumex (dose decreased to 1mg) and hydralazine  -  Holding warfarin due to upcoming surgery, discontinued on discharge  - Rheumatology consult for left elbow swelling and tenderness - dx with gout   - rheum aspirated joint fluid and injected steroids   - started allopurinol and colchicine for gout prophylaxis   - prescribed prn 5 day prednisone 40 course for breakthrough gout attack    Follow up with Dr. Delisa Montgomery on 6/15/2017 for hospital f/u  Follow up with PCP in 1 month to eval new gout dx and titrate allopurinol    PENDING RESULTS: none    DISCHARGE MEDICATIONS:  Current Discharge Medication List      START taking these medications    Details   !! allopurinol (ZYLOPRIM) 100 MG tablet Take 0.5 tablets (50 mg) by mouth daily for 14 days  Qty: 7 tablet, Refills: 0    Associated Diagnoses: Acute gout due to renal impairment involving left elbow      !! allopurinol (ZYLOPRIM) 100 MG tablet Take 1 tablet (100 mg) by mouth daily  Qty: 30 tablet, Refills: 1    Associated Diagnoses: Acute gout due to renal impairment involving left elbow      colchicine 0.6 MG tablet Take 1 tablet (0.6 mg) by mouth every other day  Qty: 30 tablet, Refills: 1    Associated Diagnoses: Acute gout due to renal impairment involving left elbow      oxyCODONE (ROXICODONE) 5 MG/5ML solution Take 2.5 mLs (2.5 mg) by mouth every 4 hours as needed (elbow pain)  Qty: 90 mL, Refills: 0    Associated Diagnoses: Acute gout due to renal impairment involving left elbow      predniSONE (DELTASONE) 20 MG tablet Take 2 tablets (40 mg) by mouth daily as needed  Qty: 10 tablet, Refills: 1    Comments: Use prn if experiencing another gout attack  Associated Diagnoses: Acute gout due to renal impairment involving left elbow       !! - Potential duplicate medications found. Please discuss with provider.      CONTINUE these medications which have CHANGED    Details   bumetanide (BUMEX) 1 MG tablet Take 1 tablet (1 mg) by mouth 2 times daily for 7 days  Qty: 14 tablet, Refills: 0    Associated Diagnoses:  NICM (nonischemic cardiomyopathy) (H)         CONTINUE these medications which have NOT CHANGED    Details   docusate sodium (COLACE) 100 MG capsule Take 1 capsule (100 mg) by mouth 2 times daily  Qty: 60 capsule, Refills: 1    Associated Diagnoses: Slow transit constipation      sennosides (SENOKOT) 8.6 MG tablet Take 1 tablet by mouth daily  Qty: 120 each, Refills: 1    Associated Diagnoses: Slow transit constipation      fluticasone-vilanterol (BREO ELLIPTA) 200-25 MCG/INH oral inhaler Inhale 1 puff into the lungs daily      metFORMIN (GLUCOPHAGE) 1000 MG tablet Take 1,000 mg by mouth daily (with dinner)      umeclidinium (INCRUSE ELLIPTA) 62.5 MCG/INH oral inhaler Inhale 1 puff into the lungs daily      diclofenac (VOLTAREN) 1 % GEL topical gel Apply 2 g topically 4 times daily as needed for moderate pain  Qty: 100 g, Refills: 0    Associated Diagnoses: Left elbow pain      hydrALAZINE (APRESOLINE) 10 MG tablet Take 1 tablet (10 mg) by mouth 3 times daily  Qty: 120 tablet, Refills: 1    Associated Diagnoses: Chronic systolic heart failure (H)      benzonatate (TESSALON PERLES) 100 MG capsule Take 200 mg by mouth 3 times daily as needed for cough      albuterol (ALBUTEROL) 108 (90 BASE) MCG/ACT Inhaler Inhale 2 puffs into the lungs every 4 hours as needed for shortness of breath / dyspnea or wheezing      ipratropium - albuterol 0.5 mg/2.5 mg/3 mL (DUONEB) 0.5-2.5 (3) MG/3ML neb solution Take 3 mLs by nebulization every 4 hours as needed for shortness of breath / dyspnea or wheezing      citalopram (CELEXA) 20 MG tablet Take 20 mg by mouth daily      cyanocobalamin (VITAMIN B12) 1000 MCG/ML injection Inject 1,000 mcg into the muscle every 30 days      diphenhydrAMINE (BENADRYL) 25 MG capsule Take 25 mg by mouth nightly as needed for itching or allergies      montelukast (SINGULAIR) 10 MG tablet Take 10 mg by mouth At Bedtime      traMADol (ULTRAM) 50 MG tablet Take 100 mg by mouth every 6 hours as needed for  moderate pain      zinc sulfate (ZINCATE) 220 (50 ZN) MG capsule Take 220 mg by mouth 2 times daily         STOP taking these medications       metoprolol (TOPROL XL) 100 MG 24 hr tablet Comments:   Reason for Stopping:         losartan (COZAAR) 50 MG tablet Comments:   Reason for Stopping:         digoxin (DIGOX) 125 MCG tablet Comments:   Reason for Stopping:         spironolactone (ALDACTONE) 25 MG tablet Comments:   Reason for Stopping:         warfarin (COUMADIN) 5 MG tablet Comments:   Reason for Stopping:             DISCHARGE DISPOSITION: Jim Willingham will discharge to home in stable condition.     DISCHARGE INSTRUCTIONS:    Discharge Procedure Orders  Reason for your hospital stay   Order Comments: You were hospitalized due to an increasing creatinine and low blood pressure, suggesting cardiogenic shock. Your creatine decreased, and we are comfortable sending you home briefly before your LVAD placement next week.     Adult [facility] Follow-up and recommended labs and tests   Order Comments: Please follow up with Dr. Delisa Montgomery on Friday, 6/16 at 11am at the [facility].    Appointments on [place] and/or [facility] (with [facility] or [facility] provider or service). Call [phone] if you haven't heard regarding these appointments within 7 days of discharge.     Activity   Order Comments: Your activity upon discharge: activity as tolerated   Order Specific Question Answer Comments   Is discharge order? Yes      Follow Up and recommended labs and tests   Order Comments: Please follow up with your primary care provider in 1 month or after you return home after your LVAD implantation to reassess your new diagnosis of gout. Please ask him or her to check your uric acid level and titrate your allopurinol dose to maintain a uric acid level below 6.     Monitor and record   Order Comments: weight every day     Discharge Instructions   Order Comments: Please follow up with Dr. Delisa Montgomery  on Friday, 6/16/2017 at 11am at the [facility].    Please follow up with your primary care provider in 1 month or after you return home after your LVAD implantation to reassess your new diagnosis of gout. Please ask him or her to check your uric acid level and titrate your allopurinol dose to maintain a uric acid level below 6.    We have sent you home with a 5 day prescription of prednisone 40 mg. Use this 5 day course when you are experiencing a gout attack.     Diet   Order Comments: Follow this diet upon discharge: Orders Placed This Encounter     2 Gram Sodium Diet   Order Specific Question Answer Comments   Is discharge order? Yes        60 minutes spent in discharge, including >50% in counseling and coordination of care, medication review and plan of care recommended on follow up. Questions were answered.     It was our pleasure to care for Jim Willingham during this hospitalization. Please do not hesitate to contact me should there be questions regarding the hospital course or discharge plan.      Martníez Locke MD, PhD  Internal Medicine PGY-1  [phone]  6/13/2017    I personally provided care for this patient, reviewed chart, discussed course with patient, housestaff and consulting physicians.  I answered all questions.    Duran Mccarty M.D.  Division of Cardiology  Department of Medicine

## 2017-06-13 NOTE — H&P
IMPRESSION:   1.  Nonischemic cardiomyopathy with acute and chronic congestive heart failure.   a.  Ambulatory cardiogenic shock.   2.  Diabetes.   3.  Obstructive sleep apnea.   4.  Acute kidney injury.   5.  Amiodarone.   6.  Severe mitral regurgitation.      Dear Doctors:      I had the opportunity to admit your patient, Lois Willingham from the clinic where he was admitted with acutely decompensated heart failure characterized by persistent hypotension, acute kidney injury and malaise.  As you are aware of his previous history, I shall not reiterate it.  He is tentatively scheduled for insertion of a left ventricular assist device later this month.  His weight remains down in the range of where it was at when he was discharged recently from [facility].  For the week preceding admission, he had excessive fatigue, lightheadedness and malaise.  He had no presyncope or syncope.  He does have remaining increased abdominal girth.      PHYSICAL EXAMINATION:   GENERAL:  Reveals an alert, oriented white male who is comfortable while supine.  He is fluent in speech.  Jugular venous pressure is within normal limits.   NECK:  There are no carotid bruits.   CHEST:  Bibasilar atelectatic rales partially clear with cough.   S1 is irregular.  S2 is split.  There is an apical murmur of mitral regurgitation.   ABDOMEN:  Protuberant but soft, without tenderness, rebound or guarding.   EXTREMITIES:  There is trace peripheral edema.      The patient was admitted, placed on dopamine.  His heart rate has remained acceptable.  He is using his sleep apnea .   On dopamine to maintain an adequate arterial pressure.  His creatinine has decreased overnight from 2.2 to 1.9.          I saw the patient with the house staff and discussed him in detail.         EDNA LOPEZ MD             D: 2017 10:58   T: 2017 11:52   MT: SHANIA      Name:     LOIS WILLINGHAM   MRN:      [id number]        Account:      [id number]   :       1955           Admitted:     [id number]      Document: [id number]       cc: Chase Molina MD       Copy for Provider        Jovany Salas MD       [facility] personally saw and examined this patient, reviewed imaging and laboratory studies, confirmed physical examination and discussed results and plan with patient and or family.

## 2017-06-13 NOTE — PLAN OF CARE
Problem: Discharge Planning  Goal: Discharge Planning (Adult, OB, Behavioral, Peds)  Discharge to home w/ plan for readmission for LVAD placement on 6/19.    Comments:   D/I:VSS.O2 sats97% on room air.  Up in room ad [place]. Lt elbow gout treated by Rheumatologist: aspirated fluid cor lab eval and injected w/ Marcaine/ and steroid. Started on Colchicine and Allopurinol. Oxycodone controlling pain. MD (Dr Montgomery) discussed plan w/ pt this am and flet OK to discharge home until planned LVAD placement next week. RTC appt w/ Dr Montgomery later this week. Wife here. D/C info reviewed w/ pt. New Rx at [facility] pharmacy.  P: discharged home w/ wife via w/c to door. Belongings w/ pt.

## 2017-06-13 NOTE — PLAN OF CARE
Problem: Goal Outcome Summary  Goal: Goal Outcome Summary  Outcome: Improving  Nights:  ITZ  Pt NPO for heart cath today. Left elbow continues to be swollen and warm.  Pain in left elbow. MD team aware. No issues overnight.  HR Paced 80's.  MAP 70-80's.  Room air sats>90%.  INR is 2.3 this am.  P:  Heart cath with swan.

## 2017-06-13 NOTE — CONSULTS
Rheumatology Consultation    Jim Willingham MRN# [id number]   Age: 62 year old YOB: 1955     Date of Admission:  6/11/2017    Primary care provider: No Ref-Primary, Physician     Assessment:  #Olecranon bursitis, likely secondary to gout  #Hyperuricemia  #CKD  #Non-ischemic cardiomyopathy, awaiting LVAD    Plan:  -aspirated and injected left olecranon bursa today, discussed with primary team, 0.5 cc slightly bloody serosanguinous fluid aspirated, negative on crystal analysis per the lab but I did not examine it myself; almost certainly still represents MSU deposition with low yield on synovial aspirate given 3 week duration of symptoms  -on discharge start allopurinol (50 mg daily for 2 weeks then increase to 100 mg daily) and colchicine 0.6 mg every other day for gout flare prophylaxis, follow up with PCP in 1 month to check uric acid, titrate allopurinol to uric acid < 6.0; could consider referral to rheumatology for initial management of gout (lowering of uric acid) if PCP uncomfortable  -labs should be checked [id number] initially then q6months while on allopurinol (CBC with diff, LFT's, Cr)  -send home with prednisone 40 mg for 5 days as needed for gout flares    Seen and discussed with Dr. Bowen.    Nba Donahue MD  Rheumatology Fellow  [phone]    I saw the patient with the fellow.  My exam and recomendations are as described.      Wing Bowen MD           Chief Complaint:   Elbow pain and swelling         HPI:   Mr. Willingham is a 62 year old male with a history of non-ischemic cardiomyopathy of uncertain etiology who was admitted to [facility] for LVAD placement given repeated hospitalizations for congestive heart failure and labile kidney function. After admission he was noted to have a painful, hot, and swollen left elbow. He has a history of hyperuricemia with intermittent episodes of mono-oligoarticular joint pains with a crystalline history (abrupt onset, lasting 5-7 days) over the  past several years. He first noticed twinges while he is sleeping that blossom into severe pain and redness upon waking. He has noticed it in both his big toes, his knee once, and also his elbow. They typically last a few days and resolve with ibuprofen however the present attack has been ongoing for 3 weeks. He has not been on allopurinol but has been on prednisone for his asthma, stating that he had been on prednisone about 30 mg daily for an entire year as of 45 days ago.         Past Medical History:     Past Medical History:   Diagnosis Date     Benign essential hypertension 5/11/2017     Cardiomyopathy, unspecified (H) 5/8/2017     CKD (chronic kidney disease) stage 3, GFR 30-59 ml/min 5/11/2017     Depression 5/11/2017     Diabetes mellitus (H) 5/11/2017     H/O gastric bypass 5/11/2017     ICD (implantable cardioverter-defibrillator), biventricular, in situ 5/11/2017     NICM (nonischemic cardiomyopathy) (H)/ EF 20% 5/11/2017    ECHO: LVEDd. 7.66 cm, Restrictive pattern , Severe mitral valve regurgitation     CECILIA (obstructive sleep apnea) 5/11/2017     Paroxysmal atrial fibrillation (H) 5/11/2017     Paroxysmal VT (H) 5/11/2017     Vitamin B12 deficiency (non anemic) 5/11/2017           Past Surgical History:      Past Surgical History:   Procedure Laterality Date     GI SURGERY  2003    [place] [facility] [place]     ORTHOPEDIC SURGERY  1994    right knee wired           Social History:     Social History   Substance Use Topics     Smoking status: Former Smoker     Quit date: 1995     Smokeless tobacco: Not on file     Alcohol use No           Family History:   No family history on file.           Allergies:     Allergies   Allergen Reactions     Ace Inhibitors Unknown     Sulfa Drugs Unknown           Medications:     No current facility-administered medications on file prior to encounter.   Current Outpatient Prescriptions on File Prior to Encounter:  docusate sodium (COLACE) 100 MG capsule Take 1 capsule (100 mg) by  mouth 2 times daily   sennosides (SENOKOT) 8.6 MG tablet Take 1 tablet by mouth daily   metoprolol (TOPROL XL) 100 MG 24 hr tablet Take 1 tablet (100 mg) by mouth 2 times daily (Patient taking differently: Take 100 mg by mouth 2 times daily )   fluticasone-vilanterol (BREO ELLIPTA) 200-25 MCG/INH oral inhaler Inhale 1 puff into the lungs daily   metFORMIN (GLUCOPHAGE) 1000 MG tablet Take 1,000 mg by mouth daily (with dinner)   umeclidinium (INCRUSE ELLIPTA) 62.5 MCG/INH oral inhaler Inhale 1 puff into the lungs daily   warfarin (COUMADIN) 5 MG tablet Take 5 mg by mouth See Admin Instructions 10 mg M,W,F, 7.5 mg all other days   diclofenac (VOLTAREN) 1 % GEL topical gel Apply 2 g topically 4 times daily as needed for moderate pain   bumetanide (BUMEX) 2 MG tablet Take 1 tablet (2 mg) by mouth 2 times daily   losartan (COZAAR) 50 MG tablet Take 0.5 tablets (25 mg) by mouth 2 times daily   hydrALAZINE (APRESOLINE) 10 MG tablet Take 1 tablet (10 mg) by mouth 3 times daily   benzonatate (TESSALON PERLES) 100 MG capsule Take 200 mg by mouth 3 times daily as needed for cough   albuterol (ALBUTEROL) 108 (90 BASE) MCG/ACT Inhaler Inhale 2 puffs into the lungs every 4 hours as needed for shortness of breath / dyspnea or wheezing   ipratropium - albuterol 0.5 mg/2.5 mg/3 mL (DUONEB) 0.5-2.5 (3) MG/3ML neb solution Take 3 mLs by nebulization every 4 hours as needed for shortness of breath / dyspnea or wheezing   citalopram (CELEXA) 20 MG tablet Take 20 mg by mouth daily   cyanocobalamin (VITAMIN B12) 1000 MCG/ML injection Inject 1,000 mcg into the muscle every 30 days   digoxin (DIGOX) 125 MCG tablet Take 125 mcg by mouth daily   diphenhydrAMINE (BENADRYL) 25 MG capsule Take 25 mg by mouth nightly as needed for itching or allergies   montelukast (SINGULAIR) 10 MG tablet Take 10 mg by mouth At Bedtime   spironolactone (ALDACTONE) 25 MG tablet Take 25 mg by mouth 2 times daily   traMADol (ULTRAM) 50 MG tablet Take 100 mg by mouth  every 6 hours as needed for moderate pain   zinc sulfate (ZINCATE) 220 (50 ZN) MG capsule Take 220 mg by mouth 2 times daily          Review of Systems:   10 point review of systems negative other than noted in HPI         Physical Exam:   Temp:  [97.9  F (36.6  C)-98.5  F (36.9  C)] 97.9  F (36.6  C)  Heart Rate:  [75-87] 75  Resp:  [18-20] 20  BP: ()/(54-99) 104/64  SpO2:  [94 %-99 %] 99 %     GENERAL APPEARANCE: pleasant adult male in NAD  HEENT: oropharynx clear, no icterus or injection, no palatal jaundice  NECK: no JVD or adenopathy  LUNGS: clear bilaterally  CARDIOVASCULAR: murmur in mitral position, irregular   ABDOMEN: soft and nontender, nondistended, BS+  SKIN: unremarkable  MUSCULOSKELETAL: left elbow with olecranon bursa warmth, small effusion, tenderness; range of motion of elbow relatively preserved and anterior aspect of joint not painful; medial and lateral aspects somewhat tender; left 1st MTP slightly tender, no warmth; no tophi or other painful, swollen joints  EXTREMITIES: 1+ edema  NEUROLOGIC: A&O, moving all extremities         Data:   Results for LOIS FULLER (MRN [id number]) as of 6/13/2017 09:59   Ref. Range 5/24/2017 06:52 5/25/2017 07:42 5/26/2017 07:00 6/13/2017 04:50   Uric Acid Latest Ref Range: 3.5 - 7.2 mg/dL 8.9 (H) 9.7 (H) 9.5 (H) 7.6 (H)   Results for LOIS FULLER (MRN [id number]) as of 6/13/2017 09:59   Ref. Range 5/26/2017 07:00 6/8/2017 09:44 6/11/2017 13:15 6/12/2017 04:46 6/13/2017 04:50   Creatinine Latest Ref Range: 0.66 - 1.25 mg/dL 1.44 (H) 2.36 (H) 2.14 (H) 1.89 (H) 1.33 (H)   Results for LOIS FULLER (MRN [id number]) as of 6/13/2017 09:59   Ref. Range 6/13/2017 04:50   WBC Latest Ref Range: 4.0 - 11.0 10e9/L 8.3   Hemoglobin Latest Ref Range: 13.3 - 17.7 g/dL 10.0 (L)   Hematocrit Latest Ref Range: 40.0 - 53.0 % 31.9 (L)   Platelet Count Latest Ref Range: 150 - 450 10e9/L 198

## 2017-06-13 NOTE — PROGRESS NOTES
[facility]    Jim Willingham is a 62 year old male with non ischemic cardiomyopathy who  He has undergone evaluation for Mechanical Circiulatory Support (MCS) therapy. Despite optimal medical management, he is NYHA class IV, INTERMACS 4, has EF < 25%. He is not able to complete cardiopulmonary stress testing due to low blood pressures with exertion,  repeated hospitalizations with deteriorating renal function.     Based on the evaluation, he meets criteria for HM II implantation as destination therapy.     Jim Willingham does not currently qualify for heart transplant related to his BMI and his present pulmonary function. This was explained to him. It is our hope that with LVAD support, eventual discontinuation of his amiodarone, and weight loss that this pulmonary function will improve but we cannot count on this. He expressed understanding of that his pulmonary function may be prohibitive for eventual cardiac transplant.     I discussed with Jim Willingham the risks of MCS therapy, including the risk of death, stroke, bleeding, wound infection, renal failure, arrhythmias, gastrointestinal bleeding, and pump thrombosis. I also discussed the survivability benefit of MCS therapy. He verbalized understanding of the above and is willing to proceed with surgery.    With respect to presentation and confirmation by the advanced heart failure team, he was presented earlier this month and the full work up is now complete. His has been reviewed with Dr. Mccarty, Dr. Conte, Dr. Quigley, Dr. Bautista and myself as well as social workers, neurpsychiatrist and the VAD coordinator team who are all in agreement that he is appropriate and approved for DT LVAD.             Delisa Montgomery MD

## 2017-06-14 ENCOUNTER — DOCUMENTATION ONLY (OUTPATIENT)
Dept: CARDIOLOGY | Facility: CLINIC | Age: 62
End: 2017-06-14

## 2017-06-14 ENCOUNTER — PRE VISIT (OUTPATIENT)
Dept: CARDIOLOGY | Facility: CLINIC | Age: 62
End: 2017-06-14

## 2017-06-14 ENCOUNTER — HOSPITAL ENCOUNTER (OUTPATIENT)
Facility: CLINIC | Age: 62
DRG: 001 | End: 2017-06-14
Attending: INTERNAL MEDICINE | Admitting: INTERNAL MEDICINE
Payer: COMMERCIAL

## 2017-06-14 DIAGNOSIS — N18.30 CKD (CHRONIC KIDNEY DISEASE) STAGE 3, GFR 30-59 ML/MIN (H): ICD-10-CM

## 2017-06-14 DIAGNOSIS — I42.8 NICM (NONISCHEMIC CARDIOMYOPATHY) (H): Primary | ICD-10-CM

## 2017-06-14 NOTE — PROCEDURES
Rheumatology Procedure Note;     Date of service: June 13, 2017  Diagnosis or Indication: gout     Patient location:     Requesting Physician: Duran Mccarty MD    Left elbow olecranon bursa was marked and a timeout was performed. Left elbow olecranon bursa aspirated with 22 gauge needle after sterilization with 2 Chlorapreps. 0.5 cc slightly bloody serosanguinous fluid aspirated and bursa injected with 30 mg methylprednisolone and 2 cc Marcaine after syringe exchange with sterile handling. No MSU crystals on laboratory analysis. Patient had satisfactory post-procedure anaesthesia indicating appropriate injection placement. No complications. Follow up per rheumatology consult note dated today.    Dr. Bowen present and assisting throughout entire procedure.    Nba Donahue MD

## 2017-06-14 NOTE — PROGRESS NOTES
Social Work Services Progress Note    Hospital Day: 3  Collaborated with:  Jim and Cate    Data:  SW informed that Jim is scheduled to have lvad implanted next week.    Intervention:  This SW will be following Jim long term, but had not yet met him as full psychosocial evaluation was complete while this SW was on vacation. Met Jim and Cate. Provided education on SW role. Provided contact information. Encouraged them to continue to attend support group.    Assessment:  Jim was open to talking with SW. He reports that he knows the next few weeks will be difficult, but he's hopeful to be feeling better. He denies any concerns about his own mood, but expresses concern for his wife. He reports that her first  received heart transplant in 1988 without good success and she is scared. Cate came in during our visit. She was glad to meet SW is open to continued support.    Plan:    Anticipated Disposition:  Home with services    Barriers to d/c plan:  Will come back next week for scheduled lvad implant.    Follow Up:  SW available inpatient and out patient for support as needed.    Pager [phone]

## 2017-06-15 ENCOUNTER — TELEPHONE (OUTPATIENT)
Dept: TRANSPLANT | Facility: CLINIC | Age: 62
End: 2017-06-15

## 2017-06-15 ENCOUNTER — APPOINTMENT (OUTPATIENT)
Dept: GENERAL RADIOLOGY | Facility: CLINIC | Age: 62
DRG: 001 | End: 2017-06-15
Attending: EMERGENCY MEDICINE
Payer: COMMERCIAL

## 2017-06-15 ENCOUNTER — HOSPITAL ENCOUNTER (INPATIENT)
Facility: CLINIC | Age: 62
LOS: 15 days | Discharge: NURSING, REHAB OR LONG-TERM CARE | DRG: 001 | End: 2017-06-30
Attending: EMERGENCY MEDICINE | Admitting: INTERNAL MEDICINE
Payer: COMMERCIAL

## 2017-06-15 ENCOUNTER — CARE COORDINATION (OUTPATIENT)
Dept: CARE COORDINATION | Facility: CLINIC | Age: 62
End: 2017-06-15

## 2017-06-15 DIAGNOSIS — I48.0 PAROXYSMAL ATRIAL FIBRILLATION (H): ICD-10-CM

## 2017-06-15 DIAGNOSIS — Z95.811 LVAD (LEFT VENTRICULAR ASSIST DEVICE) PRESENT (H): Primary | ICD-10-CM

## 2017-06-15 DIAGNOSIS — E08.22 DIABETES MELLITUS DUE TO UNDERLYING CONDITION WITH CHRONIC KIDNEY DISEASE, WITHOUT LONG-TERM CURRENT USE OF INSULIN, UNSPECIFIED CKD STAGE (H): ICD-10-CM

## 2017-06-15 DIAGNOSIS — I50.9 HEART FAILURE (H): ICD-10-CM

## 2017-06-15 DIAGNOSIS — I50.9 HEART FAILURE, UNSPECIFIED HEART FAILURE CHRONICITY, UNSPECIFIED HEART FAILURE TYPE: ICD-10-CM

## 2017-06-15 DIAGNOSIS — G89.18 ACUTE POST-OPERATIVE PAIN: ICD-10-CM

## 2017-06-15 LAB
ALBUMIN SERPL-MCNC: 3.8 G/DL (ref 3.4–5)
ALP SERPL-CCNC: 72 U/L (ref 40–150)
ALT SERPL W P-5'-P-CCNC: 26 U/L (ref 0–70)
ANION GAP SERPL CALCULATED.3IONS-SCNC: 7 MMOL/L (ref 3–14)
AST SERPL W P-5'-P-CCNC: 23 U/L (ref 0–45)
BASE EXCESS BLDA CALC-SCNC: 3.6 MMOL/L
BASOPHILS # BLD AUTO: 0 10E9/L (ref 0–0.2)
BASOPHILS NFR BLD AUTO: 0.4 %
BILIRUB SERPL-MCNC: 0.8 MG/DL (ref 0.2–1.3)
BUN SERPL-MCNC: 33 MG/DL (ref 7–30)
CALCIUM SERPL-MCNC: 9 MG/DL (ref 8.5–10.1)
CHLORIDE SERPL-SCNC: 103 MMOL/L (ref 94–109)
CO2 BLDCOV-SCNC: 28 MMOL/L (ref 21–28)
CO2 SERPL-SCNC: 29 MMOL/L (ref 20–32)
CREAT SERPL-MCNC: 1.41 MG/DL (ref 0.66–1.25)
DIFFERENTIAL METHOD BLD: ABNORMAL
EOSINOPHIL # BLD AUTO: 0.1 10E9/L (ref 0–0.7)
EOSINOPHIL NFR BLD AUTO: 0.6 %
ERYTHROCYTE [DISTWIDTH] IN BLOOD BY AUTOMATED COUNT: 13.8 % (ref 10–15)
GFR SERPL CREATININE-BSD FRML MDRD: 51 ML/MIN/1.7M2
GLUCOSE BLDC GLUCOMTR-MCNC: 146 MG/DL (ref 70–99)
GLUCOSE BLDC GLUCOMTR-MCNC: 148 MG/DL (ref 70–99)
GLUCOSE SERPL-MCNC: 109 MG/DL (ref 70–99)
HCO3 BLD-SCNC: 28 MMOL/L (ref 21–28)
HCT VFR BLD AUTO: 33.6 % (ref 40–53)
HGB BLD-MCNC: 10.7 G/DL (ref 13.3–17.7)
IMM GRANULOCYTES # BLD: 0 10E9/L (ref 0–0.4)
IMM GRANULOCYTES NFR BLD: 0.4 %
INR PPP: 1.53 (ref 0.86–1.14)
INTERPRETATION ECG - MUSE: NORMAL
LACTATE BLD-SCNC: 1 MMOL/L (ref 0.7–2.1)
LYMPHOCYTES # BLD AUTO: 1.4 10E9/L (ref 0.8–5.3)
LYMPHOCYTES NFR BLD AUTO: 12.5 %
MCH RBC QN AUTO: 29.6 PG (ref 26.5–33)
MCHC RBC AUTO-ENTMCNC: 31.8 G/DL (ref 31.5–36.5)
MCV RBC AUTO: 93 FL (ref 78–100)
MONOCYTES # BLD AUTO: 1.2 10E9/L (ref 0–1.3)
MONOCYTES NFR BLD AUTO: 11.2 %
NEUTROPHILS # BLD AUTO: 8.1 10E9/L (ref 1.6–8.3)
NEUTROPHILS NFR BLD AUTO: 74.9 %
NRBC # BLD AUTO: 0 10*3/UL
NRBC BLD AUTO-RTO: 0 /100
NT-PROBNP SERPL-MCNC: 4216 PG/ML (ref 0–900)
O2/TOTAL GAS SETTING VFR VENT: 25 %
PCO2 BLD: 41 MM HG (ref 35–45)
PCO2 BLDV: 47 MM HG (ref 40–50)
PH BLD: 7.45 PH (ref 7.35–7.45)
PH BLDV: 7.38 PH (ref 7.32–7.43)
PLATELET # BLD AUTO: 215 10E9/L (ref 150–450)
PO2 BLD: 99 MM HG (ref 80–105)
PO2 BLDV: 36 MM HG (ref 25–47)
POTASSIUM SERPL-SCNC: 4 MMOL/L (ref 3.4–5.3)
PROT SERPL-MCNC: 7.3 G/DL (ref 6.8–8.8)
RBC # BLD AUTO: 3.61 10E12/L (ref 4.4–5.9)
SAO2 % BLDV FROM PO2: 66 %
SODIUM SERPL-SCNC: 139 MMOL/L (ref 133–144)
WBC # BLD AUTO: 10.8 10E9/L (ref 4–11)

## 2017-06-15 PROCEDURE — 40000281 ZZH STATISTIC TRANSPORT TIME EA 15 MIN

## 2017-06-15 PROCEDURE — 40000275 ZZH STATISTIC RCP TIME EA 10 MIN

## 2017-06-15 PROCEDURE — 99285 EMERGENCY DEPT VISIT HI MDM: CPT | Mod: 25 | Performed by: EMERGENCY MEDICINE

## 2017-06-15 PROCEDURE — 82803 BLOOD GASES ANY COMBINATION: CPT

## 2017-06-15 PROCEDURE — 85610 PROTHROMBIN TIME: CPT | Performed by: EMERGENCY MEDICINE

## 2017-06-15 PROCEDURE — 93005 ELECTROCARDIOGRAM TRACING: CPT

## 2017-06-15 PROCEDURE — 00000146 ZZHCL STATISTIC GLUCOSE BY METER IP

## 2017-06-15 PROCEDURE — 71020 XR CHEST 2 VW: CPT

## 2017-06-15 PROCEDURE — 40000275 ZZH STATISTIC RCP TIME EA 10 MIN: Performed by: OPTOMETRIST

## 2017-06-15 PROCEDURE — 25000132 ZZH RX MED GY IP 250 OP 250 PS 637: Performed by: STUDENT IN AN ORGANIZED HEALTH CARE EDUCATION/TRAINING PROGRAM

## 2017-06-15 PROCEDURE — 83880 ASSAY OF NATRIURETIC PEPTIDE: CPT | Performed by: EMERGENCY MEDICINE

## 2017-06-15 PROCEDURE — 82803 BLOOD GASES ANY COMBINATION: CPT | Performed by: STUDENT IN AN ORGANIZED HEALTH CARE EDUCATION/TRAINING PROGRAM

## 2017-06-15 PROCEDURE — 93005 ELECTROCARDIOGRAM TRACING: CPT | Performed by: EMERGENCY MEDICINE

## 2017-06-15 PROCEDURE — 80053 COMPREHEN METABOLIC PANEL: CPT | Performed by: EMERGENCY MEDICINE

## 2017-06-15 PROCEDURE — 94640 AIRWAY INHALATION TREATMENT: CPT

## 2017-06-15 PROCEDURE — 85025 COMPLETE CBC W/AUTO DIFF WBC: CPT | Performed by: EMERGENCY MEDICINE

## 2017-06-15 PROCEDURE — 93010 ELECTROCARDIOGRAM REPORT: CPT | Performed by: INTERNAL MEDICINE

## 2017-06-15 PROCEDURE — 93010 ELECTROCARDIOGRAM REPORT: CPT | Mod: Z6 | Performed by: EMERGENCY MEDICINE

## 2017-06-15 PROCEDURE — 21400006 ZZH R&B CCU INTERMEDIATE UMMC

## 2017-06-15 PROCEDURE — 25000125 ZZHC RX 250: Performed by: STUDENT IN AN ORGANIZED HEALTH CARE EDUCATION/TRAINING PROGRAM

## 2017-06-15 PROCEDURE — 25000128 H RX IP 250 OP 636: Performed by: STUDENT IN AN ORGANIZED HEALTH CARE EDUCATION/TRAINING PROGRAM

## 2017-06-15 PROCEDURE — 94640 AIRWAY INHALATION TREATMENT: CPT | Mod: 76 | Performed by: OPTOMETRIST

## 2017-06-15 PROCEDURE — 36600 WITHDRAWAL OF ARTERIAL BLOOD: CPT

## 2017-06-15 PROCEDURE — 83605 ASSAY OF LACTIC ACID: CPT

## 2017-06-15 RX ORDER — LIDOCAINE 40 MG/G
CREAM TOPICAL
Status: DISCONTINUED | OUTPATIENT
Start: 2017-06-15 | End: 2017-06-20

## 2017-06-15 RX ORDER — DEXTROSE MONOHYDRATE 25 G/50ML
25-50 INJECTION, SOLUTION INTRAVENOUS
Status: DISCONTINUED | OUTPATIENT
Start: 2017-06-15 | End: 2017-06-20

## 2017-06-15 RX ORDER — AMOXICILLIN 250 MG
1-2 CAPSULE ORAL 2 TIMES DAILY PRN
Status: DISCONTINUED | OUTPATIENT
Start: 2017-06-15 | End: 2017-06-20

## 2017-06-15 RX ORDER — ONDANSETRON 2 MG/ML
4 INJECTION INTRAMUSCULAR; INTRAVENOUS EVERY 6 HOURS PRN
Status: DISCONTINUED | OUTPATIENT
Start: 2017-06-15 | End: 2017-06-20

## 2017-06-15 RX ORDER — DOCUSATE SODIUM 100 MG/1
100 CAPSULE, LIQUID FILLED ORAL 2 TIMES DAILY
Status: DISCONTINUED | OUTPATIENT
Start: 2017-06-15 | End: 2017-06-20

## 2017-06-15 RX ORDER — FUROSEMIDE 10 MG/ML
40 INJECTION INTRAMUSCULAR; INTRAVENOUS ONCE
Status: COMPLETED | OUTPATIENT
Start: 2017-06-15 | End: 2017-06-15

## 2017-06-15 RX ORDER — FUROSEMIDE 10 MG/ML
40 INJECTION INTRAMUSCULAR; INTRAVENOUS ONCE
Status: COMPLETED | OUTPATIENT
Start: 2017-06-16 | End: 2017-06-16

## 2017-06-15 RX ORDER — POLYETHYLENE GLYCOL 3350 17 G/17G
17 POWDER, FOR SOLUTION ORAL DAILY PRN
Status: DISCONTINUED | OUTPATIENT
Start: 2017-06-15 | End: 2017-06-20

## 2017-06-15 RX ORDER — CYANOCOBALAMIN 1000 UG/ML
1000 INJECTION, SOLUTION INTRAMUSCULAR; SUBCUTANEOUS
Status: DISCONTINUED | OUTPATIENT
Start: 2017-07-01 | End: 2017-06-20

## 2017-06-15 RX ORDER — IPRATROPIUM BROMIDE AND ALBUTEROL SULFATE 2.5; .5 MG/3ML; MG/3ML
3 SOLUTION RESPIRATORY (INHALATION) EVERY 4 HOURS PRN
Status: DISCONTINUED | OUTPATIENT
Start: 2017-06-15 | End: 2017-06-15

## 2017-06-15 RX ORDER — ALBUTEROL SULFATE 90 UG/1
2 AEROSOL, METERED RESPIRATORY (INHALATION) EVERY 4 HOURS PRN
Status: DISCONTINUED | OUTPATIENT
Start: 2017-06-15 | End: 2017-06-20

## 2017-06-15 RX ORDER — COLCHICINE 0.6 MG/1
0.6 TABLET ORAL DAILY
Status: DISCONTINUED | OUTPATIENT
Start: 2017-06-16 | End: 2017-06-20

## 2017-06-15 RX ORDER — NICOTINE POLACRILEX 4 MG
15-30 LOZENGE BUCCAL
Status: DISCONTINUED | OUTPATIENT
Start: 2017-06-15 | End: 2017-06-20

## 2017-06-15 RX ORDER — ZINC SULFATE 50(220)MG
220 CAPSULE ORAL 2 TIMES DAILY
Status: DISCONTINUED | OUTPATIENT
Start: 2017-06-15 | End: 2017-06-20

## 2017-06-15 RX ORDER — ACETAMINOPHEN 325 MG/1
650 TABLET ORAL EVERY 4 HOURS PRN
Status: DISCONTINUED | OUTPATIENT
Start: 2017-06-15 | End: 2017-06-20

## 2017-06-15 RX ORDER — BISACODYL 10 MG
10 SUPPOSITORY, RECTAL RECTAL DAILY PRN
Status: DISCONTINUED | OUTPATIENT
Start: 2017-06-15 | End: 2017-06-20

## 2017-06-15 RX ORDER — AMOXICILLIN 250 MG
1-2 CAPSULE ORAL 2 TIMES DAILY
Status: DISCONTINUED | OUTPATIENT
Start: 2017-06-15 | End: 2017-06-20

## 2017-06-15 RX ORDER — FUROSEMIDE 10 MG/ML
40 INJECTION INTRAMUSCULAR; INTRAVENOUS 3 TIMES DAILY
Status: DISCONTINUED | OUTPATIENT
Start: 2017-06-15 | End: 2017-06-20

## 2017-06-15 RX ORDER — HYDRALAZINE HYDROCHLORIDE 10 MG/1
10 TABLET, FILM COATED ORAL 3 TIMES DAILY
Status: DISCONTINUED | OUTPATIENT
Start: 2017-06-15 | End: 2017-06-20

## 2017-06-15 RX ORDER — SENNOSIDES 8.6 MG
1 TABLET ORAL DAILY
Status: DISCONTINUED | OUTPATIENT
Start: 2017-06-15 | End: 2017-06-20

## 2017-06-15 RX ORDER — OXYCODONE HCL 5 MG/5 ML
2.5 SOLUTION, ORAL ORAL EVERY 4 HOURS PRN
Status: DISCONTINUED | OUTPATIENT
Start: 2017-06-15 | End: 2017-06-18

## 2017-06-15 RX ORDER — IPRATROPIUM BROMIDE AND ALBUTEROL SULFATE 2.5; .5 MG/3ML; MG/3ML
3 SOLUTION RESPIRATORY (INHALATION)
Status: DISCONTINUED | OUTPATIENT
Start: 2017-06-15 | End: 2017-06-16

## 2017-06-15 RX ORDER — CITALOPRAM HYDROBROMIDE 20 MG/1
20 TABLET ORAL DAILY
Status: DISCONTINUED | OUTPATIENT
Start: 2017-06-16 | End: 2017-06-20

## 2017-06-15 RX ORDER — ALLOPURINOL 100 MG/1
50 TABLET ORAL DAILY
Status: DISCONTINUED | OUTPATIENT
Start: 2017-06-16 | End: 2017-06-20

## 2017-06-15 RX ORDER — MONTELUKAST SODIUM 10 MG/1
10 TABLET ORAL AT BEDTIME
Status: DISCONTINUED | OUTPATIENT
Start: 2017-06-15 | End: 2017-06-20

## 2017-06-15 RX ORDER — ONDANSETRON 4 MG/1
4 TABLET, ORALLY DISINTEGRATING ORAL EVERY 6 HOURS PRN
Status: DISCONTINUED | OUTPATIENT
Start: 2017-06-15 | End: 2017-06-20

## 2017-06-15 RX ORDER — NALOXONE HYDROCHLORIDE 0.4 MG/ML
.1-.4 INJECTION, SOLUTION INTRAMUSCULAR; INTRAVENOUS; SUBCUTANEOUS
Status: DISCONTINUED | OUTPATIENT
Start: 2017-06-15 | End: 2017-06-20

## 2017-06-15 RX ADMIN — HYDRALAZINE HYDROCHLORIDE 10 MG: 10 TABLET ORAL at 19:39

## 2017-06-15 RX ADMIN — Medication 220 MG: at 19:40

## 2017-06-15 RX ADMIN — IPRATROPIUM BROMIDE AND ALBUTEROL SULFATE 3 ML: .5; 3 SOLUTION RESPIRATORY (INHALATION) at 15:47

## 2017-06-15 RX ADMIN — MONTELUKAST SODIUM 10 MG: 10 TABLET, FILM COATED ORAL at 21:15

## 2017-06-15 RX ADMIN — HYDRALAZINE HYDROCHLORIDE 10 MG: 10 TABLET ORAL at 16:31

## 2017-06-15 RX ADMIN — IPRATROPIUM BROMIDE AND ALBUTEROL SULFATE 3 ML: .5; 3 SOLUTION RESPIRATORY (INHALATION) at 20:34

## 2017-06-15 RX ADMIN — FUROSEMIDE 40 MG: 10 INJECTION, SOLUTION INTRAVENOUS at 15:52

## 2017-06-15 RX ADMIN — SENNOSIDES 1 TABLET: 8.6 TABLET, FILM COATED ORAL at 15:52

## 2017-06-15 RX ADMIN — FUROSEMIDE 40 MG: 10 INJECTION, SOLUTION INTRAVENOUS at 19:40

## 2017-06-15 ASSESSMENT — ACTIVITIES OF DAILY LIVING (ADL)
SWALLOWING: 0-->SWALLOWS FOODS/LIQUIDS WITHOUT DIFFICULTY
NUMBER_OF_TIMES_PATIENT_HAS_FALLEN_WITHIN_LAST_SIX_MONTHS: 1
RETIRED_EATING: 0-->INDEPENDENT
BATHING: 0-->INDEPENDENT
DRESS: 0-->INDEPENDENT
RETIRED_COMMUNICATION: 0-->UNDERSTANDS/COMMUNICATES WITHOUT DIFFICULTY
TRANSFERRING: 0-->INDEPENDENT
AMBULATION: 0-->INDEPENDENT
TOILETING: 0-->INDEPENDENT
FALL_HISTORY_WITHIN_LAST_SIX_MONTHS: YES
COGNITION: 0 - NO COGNITION ISSUES REPORTED

## 2017-06-15 ASSESSMENT — ENCOUNTER SYMPTOMS: SHORTNESS OF BREATH: 1

## 2017-06-15 NOTE — PHARMACY-ADMISSION MEDICATION HISTORY
Admission medication history interview status for the 6/15/2017 admission is complete. See Epic admission navigator for allergy information, pharmacy, prior to admission medications and immunization status.     Medication history interview sources:  Care Everywhere, patient    Changes made to PTA medication list (reason)  Added: none  Deleted: allopurinol 100 mg tablet, take 1 tablet by mouth daily (patient has prescription for 50 mg daily now)  Changed:   -colchicine 0.6 mg by mouth every other day --> patient taking 0.6 mg by mouth daily  -patient reports not taking hydralazine, metformin, or diphenhydramine    Additional medication history information (including reliability of information, actions taken by pharmacist):  -Patient was on BiPAP but able to answer medication questions   -Patient reports using albuterol inhaler three times daily and Duonebs every 4 hours  -Patient typically takes 100 mg tramadol in the morning and 100 mg at night for shoulder pain  -Patient uses diclofenac gel and oxycodone solution for elbow pain due to gout  -Patient reported that many of his medications have put on hold since he was last in the hospital Sunday (6/11). His hydralazine and metformin have been on hold and according to Care Everywhere and the patient he was previously taking:  *metoprolol  mg in AM and 100 mg in PM  *digoxin 0.125 mg daily  *losartan 50 mg twice daily  *spironolactone 25 mg twice daily  *amiodarone 100 mg daily  *magnesium oxide 250 mg daily  *warfarin 10 mg MWF, 7.5 mg rest of week for A.fib/pacemaker, INR goal of 2-3, INR checks at [facility]  -Patient did not receive flu shot this past year      Prior to Admission medications    Medication Sig Last Dose Taking? Auth Provider   bumetanide (BUMEX) 1 MG tablet Take 1 tablet (1 mg) by mouth 2 times daily for 7 days 6/15/2017 at AM Yes Duran Mccarty MD   allopurinol (ZYLOPRIM) 100 MG tablet Take 0.5 tablets (50 mg)  by mouth daily for 14 days 6/15/2017 at AM Yes Duran Mccarty MD   colchicine 0.6 MG tablet Take 1 tablet (0.6 mg) by mouth every other day  Patient taking differently: Take 0.6 mg by mouth daily  6/15/2017 at AM Yes Duran Mccarty MD   oxyCODONE (ROXICODONE) 5 MG/5ML solution Take 2.5 mLs (2.5 mg) by mouth every 4 hours as needed (elbow pain) 6/14/2017 at PM Yes Lonnie Vargas MD   docusate sodium (COLACE) 100 MG capsule Take 1 capsule (100 mg) by mouth 2 times daily 6/15/2017 at AM Yes Delisa Montgomery MD   sennosides (SENOKOT) 8.6 MG tablet Take 1 tablet by mouth daily 6/15/2017 at AM Yes Delisa Montgomery MD   diclofenac (VOLTAREN) 1 % GEL topical gel Apply 2 g topically 4 times daily as needed for moderate pain Past Week at Unknown time Yes Rose Conte MD   albuterol (ALBUTEROL) 108 (90 BASE) MCG/ACT Inhaler Inhale 2 puffs into the lungs every 4 hours as needed for shortness of breath / dyspnea or wheezing 6/15/2017 at AM Yes Reported, Patient   ipratropium - albuterol 0.5 mg/2.5 mg/3 mL (DUONEB) 0.5-2.5 (3) MG/3ML neb solution Take 3 mLs by nebulization every 4 hours as needed for shortness of breath / dyspnea or wheezing 6/15/2017 at AM Yes Reported, Patient   citalopram (CELEXA) 20 MG tablet Take 20 mg by mouth daily 6/15/2017 at AM Yes Reported, Patient   cyanocobalamin (VITAMIN B12) 1000 MCG/ML injection Inject 1,000 mcg into the muscle every 30 days Past Month at Unknown time Yes Reported, Patient   fluticasone-vilanterol (BREO ELLIPTA) 200-25 MCG/INH oral inhaler Inhale 1 puff into the lungs daily 6/15/2017 at AM Yes Reported, Patient   metFORMIN (GLUCOPHAGE) 1000 MG tablet Take 1,000 mg by mouth daily (with dinner) 6/11/2017 Yes Reported, Patient   montelukast (SINGULAIR) 10 MG tablet Take 10 mg by mouth At Bedtime 6/14/2017 at PM Yes Reported, Patient   traMADol (ULTRAM) 50 MG tablet Take 100 mg by mouth every 6 hours as needed for moderate pain  Past Week at Unknown time Yes Reported, Patient   umeclidinium (INCRUSE ELLIPTA) 62.5 MCG/INH oral inhaler Inhale 1 puff into the lungs daily 6/15/2017 at AM Yes Reported, Patient   zinc sulfate (ZINCATE) 220 (50 ZN) MG capsule Take 220 mg by mouth 2 times daily 6/15/2017 at AM Yes Reported, Patient   hydrALAZINE (APRESOLINE) 10 MG tablet Take 1 tablet (10 mg) by mouth 3 times daily  Patient not taking: Reported on 6/15/2017 Not Taking at Unknown time  Delisa Montgomery MD   diphenhydrAMINE (BENADRYL) 25 MG capsule Take 25 mg by mouth nightly as needed for itching or allergies More than a month at Unknown time  Reported, Patient         Medication history completed by: Concepcion Kelly, Pharm.D. Student

## 2017-06-15 NOTE — TELEPHONE ENCOUNTER
Worsening symptoms:  Mr Willingham is completing a VAD evaluation, and was recently hospitalized.  He called today to report worsening shortness of breath, which he thinks is related to his diuretic dose being lowered.  He has not been able to weigh himself today, so is unsure how far off from baseline he is.  He is using his CPAP mask currently, which helps him feel better.   He called to report that he will be calling an ambulance to bring him to the ER, rather than waiting for his clinic appt later today.    Dr Montgomery and Rajani Abdi RN, have been notified of above.

## 2017-06-15 NOTE — ED NOTES
Bed: IN01  Expected date: 6/15/17  Expected time: 12:00 PM  Means of arrival:   Comments:  ED Referral Note:  Referred to ED by Transplant Coordinator    --Patient name, :  Jim Willingham      --MR#:  [id number]      --Referral/Medical C/o:  RESENDIZ / SOB; eval'd to have a VAD placed.      --ETA:  1200pm    Coleman Maynard  Freda 15, 2017

## 2017-06-15 NOTE — IP AVS SNAPSHOT
MRN:[id number]                      After Visit Summary   6/15/2017    Jim Willingham    MRN: [id number]           Thank you!     Thank you for choosing [facility] for your care. Our goal is always to provide you with excellent care. Hearing back from our patients is one way we can continue to improve our services. Please take a few minutes to complete the written survey that you may receive in the mail after you visit with us. Thank you!        Patient Information     Date Of Birth          1955        Designated Caregiver       Most Recent Value    Caregiver    Will someone help with your care after discharge? yes    Name of designated caregiver Cate    Phone number of caregiver [phone]    Caregiver address [address] [facility] [address] [place]      About your hospital stay     You were admitted on:  Freda 15, 2017 You last received care in the:  [address] [facility]    You were discharged on:  June 30, 2017        Reason for your hospital stay       You were admitted with respiratory failure due to your heart failure. Your LVAD was placed 6/19/17 by Dr Quigley.                  Who to Call     For medical emergencies, please call 911.  For non-urgent questions about your medical care, please call your primary care provider or clinic, None  For questions related to your surgery, please call your surgery clinic        Attending Provider     Provider Specialty    Philipp Ross MD Emergency Medicine    [facility]    Ronnie Quigley MD Transplant       Primary Care Provider    Physician No Ref-Primary      After Care Instructions     Activity - Up ad francisco           Advance Diet as Tolerated       Follow this diet upon discharge: Orders Placed This Encounter      Snacks/Supplements Adult: Ensure Clear; Between Meals      Snacks/Supplements Adult: ProStat Sugar Free; Between Meals      Consistent Carbohydrate Diet 4825-3631 Calories: High Consistent CHO (4-7 CHO units/meal)             BIPAP treatment       Home Bipap is okay with home settings.            Cardiac sternal precautions           Daily weights       Call LVAD coordinator for weight gain of more than 2 pounds per day or 5 pounds per week.            General info for SNF       Length of Stay Estimate: Short Term Care: Estimated # of Days <30  Condition at Discharge: Improving  Level of care:skilled   Rehabilitation Potential: Good  Admission H&P remains valid and up-to-date: Yes  Recent Chemotherapy: N/A  Use Nursing Home Standing Orders: Yes            Intake and output       Every shift            Mantoux instructions       Give two-step Mantoux (PPD) Per Facility Policy Yes            Oxygen - Nasal cannula       Titrate Lpm PRN by nasal cannula to keep O2 sats 92% or greater.            Wound care       Site:   Driveline dressing  Instructions:    Sterile daily dressing changes.            Wound care (specify)       Site:   Sternal wound and chest tube sites.   Instructions:    You have dissolvable sutures under your skin that do not need to be removed.  Pat wound dressing dry after showers.  Skin glue will eventually fall off in 1-2 weeks.     Keep wound clean and dry. No baths or swimming while LVAD in place.  Clean wounds twice a day for 2 weeks with microklenz spray if available. Cover chest tube sites with gauze until they stop draining, then leave open.                  Follow-up Appointments     Follow Up and recommended labs and tests       Follow up with Rehab Provider Care Team.  The following labs/tests are recommended: INR check Monday, July 3rd. BMP every other day while diuresing.                  Your next 10 appointments already scheduled     Jul 05, 2017 10:15 AM CDT   Lab with  LAB   [facility] ([facility])    [address] [place] [id number] [phone]            Jul 05, 2017 11:00 AM CDT   (Arrive by 10:45 AM)   Ventricular Assist Device with  Cate Marshall NP   [facility] ([facility])    [address] [place] [address]   [phone]            Jul 05, 2017 11:30 AM CDT   (Arrive by 11:15 AM)   Implanted Defibulator with Uc Cv Device 1   [facility] ([facility])    [address] [place] [address]   [phone]            Jul 25, 2017  8:30 AM CDT   Lab with [facility]    [facility]    [address] [place] [address]   [phone]            Jul 25, 2017  9:00 AM CDT   (Arrive by 8:45 AM)   Ventricular Assist Device with Delisa Montgomery MD   [facility] ([facility])    [address] [place] [address]   [phone]            Aug 11, 2017 11:00 AM CDT   Lab with UC LAB   [facility] ([facility])    [address] [place] [phone]   [phone]            Aug 11, 2017 11:30 AM CDT   (Arrive by 11:15 AM)   Ventricular Assist Device with Delisa Montgomery MD   [facility] ([facility])    [address] [place] [address]   [phone]            Aug 21, 2017  2:00 PM CDT   Lab with UC LAB    [facility] ([facility])    [address] [place] [address]   [phone]            Aug 21, 2017  2:30 PM CDT   (Arrive by 2:15 PM)   Return Visit with Ronnie Quigley MD   [facility] ([facility])    [address] [place] [address]   [phone]            Sep 15, 2017 10:00 AM CDT   (Arrive by 9:45 AM)   Ventricular Assist Device with Jeanine Wilson NP   [facility] ([facility])    [address] [place] [address]   [phone]               Additional Services     INR Clinic Referral       [facility]   Phone: [phone]  Fax: [phone]    For INR and Warfarin monitoring (Goal=2-3).   Indication for Anticoagulation: LVAD.   Dr. Delisa Montgomery to follow.            Medication Therapy Management Referral       Reason for referral:  on more than 5 medications and managing chronic disease and on more than 10 medications and hospitalized or in the ED in the past 6 months     This service is designed to help you get the most from your medications.  A specially trained pharmacist will work closely with you and your doctors  to solve any problems related to your medications and to help you get the   best results from taking them.      The Medication Therapy Management staff will call you to schedule an appointment.            Nutrition Services Adult IP Consult       Reason:  Diabetes and general nutrition            Occupational Therapy Adult Consult       Evaluate and treat as clinically indicated.    Reason:  Deconditioning, Sternal precautions            Physical Therapy Adult Consult       Evaluate and treat as clinically indicated.    Reason:  Deconditioning, Sternal precautions                  Further instructions from your care team       Going home with your VAD    You are about to leave the hospital with your new VAD. This time can feel overwhelming. Your VAD Coordinator team is here to do everything we can to make this transition as smooth as possible. Below are contact numbers and information to help ease the process. A VAD Coordinator is available 24/7 should you have any questions. We would be more than happy to assist you.     Please remember, if you have a true emergency, ALWAYS call 911 first. Then call the on-call VAD Coordinator.     To Reach the On-Call VAD Coordinator: Dial the main hospital number at [phone]. Choose option 4 to speak with the . Ask him or her to page the on-call  VAD Coordinator. We should get back to you within five minutes.     Symptoms to Report: Please contact the on-call VAD Coordinator to report:  - Bleeding   - Dizziness/lightheadedness  - Changes in your VAD parameters (+/- 2 from baseline)  - VAD alarms  - Numbness, tingling, or difficulty moving your arms or legs  - Changes in speech, swallowing, or mental status  - ANY head injury, even if it is just a small bump  - Dark, black, tarry, red, or bloody stools  - If you vomit and it is bloody, black, or looks like coffee grounds  - Increased drainage, redness, tenderness, or trauma to your driveline site, chest incision, or chest tube sites    - Questions about your medications  - Questions about your Coumadin or INR  - Any other changes or concerns    Dressing Supplies: Your dressing supply company is [employer]. Please call them once you leave the hospital. They can be reached at [phone]. Verify that they have the correct address for delivery, especially if you are staying in the [place] before going home.     Blood Thinners: Your Coumadin (warfarin) will be managed by the [facility]. They can be reached M-F, 8am-4pm. They will communicate with you regarding your blood draws and your Coumadin dose. If you have an INR drawn and you don t hear from the clinic by 2pm please call them at [phone].  Please never allow anyone else to adjust your Coumadin or Aspirin without talking to a VAD Coordinator.     Clinic Appointments: The VAD team will schedule you for all of your follow-up visits. If you have any questions please call the main VAD office at [phone] to speak with our . You can also contact your VAD Coordinator.     VAD Coordinator: Your VAD Coordinator, Rajani Abdi, can be reached M-F, 8am-4pm, at [phone] or [email or web address].    If you have any further questions or concerns about your VAD please refer to the discharge folder you  received from your VAD Coordinator and/or call the on-call VAD Coordinator.       [facility]      AFTER YOU GO HOME FROM YOUR HEART SURGERY    Avoid lifting anything greater than te pounds for 6 weeks after surgery and then less than 20 pounds for an additional 6 weeks.    No driving until cleared by LVAD team.     Avoid strenuous activities such as bowling, vacuuming, raking, shoveling, golf or tennis for 12 weeks after your surgery. It is okay to resume sex if you feel comfortable in doing so. You may have to try different positions with your partner.     Splint your chest incision by hugging a pillow or bringing your arms across your chest when coughing or sneezing. Avoid pushing off with your arms when getting up for the first month if you have had your sternum opened.    Shower or wash your incisions daily with soap and water (or as instructed), pat dry. Keep wound clean and dry, showers are okay after discharge, but don't let spray hit directly on incision. No baths or swimming for 1 month.  Clean wounds twice a day for 2 weeks with microklenz spray if available. Cover chest tube sites with gauze until they stop draining, then leave open. It is not abnormal for chest tube sites to drain yellowish/clear fluid for up to 2-3 weeks after surgery.   Watch for signs of infection: increased redness, tenderness, warmth or any drainage that appears infected (pus like) or is persistent.  Also a temperature > 100.5 F or chills. Call your surgeon or primary care provider's office immediately. Remove any skin glue left on incisions after 10 days. This will not affect your incision and can speed up healing.    Exercise is very important in your recovery. Please follow the guidelines set up for you in your cardiac rehab classes at the hospital. If outpatient cardiac rehab was ordered for you, we highly recommend you participate. If you have problems arranging your cardiac rehab, please call  [phone].     Avoid sitting for prolonged periods of time, try to walk every hour during the day. If you have a leg incision, elevate your leg often when you are not walking.    Check your weight when you get home from the hospital and continue to check it daily through your recovery for at least a month. If you notice a weight gain of 2-3 pounds in a week, notify your primary care physician, cardiologist or surgeon.    Bowel activity may be slow after surgery. If necessary, you may take an over the counter laxative such as Milk of Magnesia or Miralax. You may have stool softeners prescribed (docusate sodium, Senokot). We recommend using stool softeners while using narcotics for pain (oxycodone/percocet, hydrocodone/vicodin).      DENTAL VISITS AFTER SURGERY  If you have had your heart valve repaired or replaced, we do not recommend having any dental work done for 6 months and you will need to take an antibiotic prior to dental visits from now on.  Please notify your dentist before any procedure for the proper treatment needed. The antibiotic is taken by mouth one hour prior to visit. This includes routine cleanings.    DO NOT SMOKE.  IF YOU NEED HELP QUITTING, PLEASE TALK WITH YOUR CARDIOLOGIST OR PRIMARY DOCTOR.    You are on a blood thinner, follow the instructions you were given in the hospital and DO NOT SKIP this medication. Try and take it the same time everyday. Your primary care physician or coumadin clinic will manage the dosing.     If you have an LVAD device call your LVAD coordinator.     REGARDING PRESCRIPTION REFILLS.  If you need a refill on your pain medication contact us.  All other medications will be adjusted, discontinued and re-filled by your primary care physician and/or your cardiologist as they were prior to your surgery. We have given you enough for one to three month with possibly one refill.    POST-OPERATIVE CLINIC VISITS  You will then return to the care of your primary physician  and your cardiologist heart failure LVAD team.   It is important to call for an appointment to see your cardiologist in 2-3 weeks after discharge and your primary care physician in 2-4 weeks after discharge. If there is a need to return to see CT Surgery please call our  at [phone].  Your LVAD coordinator will assist in making appointments to be seen.     SURGICAL QUESTIONS  Please call Gayla Reyes with any surgical questions, her phone number is listed below.  She can assist you with your needs and contact other surgery care team members as indicated.    For general questions or concerns, please call the Cardiothoracic Surgery Department at [phone] 8-4:30 M-F.   On weekends or after hours, please call [phone] and ask the  to   page the Cardiothoracic Surgery fellow on call.      Thank you,    Your Cardiothoracic Surgery Team  Gayla Reyes RN Care Coordinator-  [phone]   Clara Krueger PA-C                        Warfarin Instruction     You have started taking a medicine called warfarin. This is a blood-thinning medicine (anticoagulant). It helps prevent and treat blood clots.      Before leaving the hospital, make sure you know how much to take and how long to take it.      You will need regular blood tests to make sure your blood is clotting safely. It is very important to see your doctor for regular blood tests.    Talk to your doctor before taking any new medicine (this includes over-the-counter drugs and herbal products). Many medicines can interact with warfarin. This may cause more bleeding or too much clotting.     Eating a lot of vitamin K--found in green, leafy vegetables--can change the way warfarin works in your body. Do NOT avoid these foods. Instead, try to eat the same amount each day.     Bleeding is the most common side-effect of warfarin. You may notice bleeding gums, a bloody nose, bruises and bleeding longer  "when you cut yourself. See a doctor at once if:   o You cough up blood  o You find blood in your stool (poop)  o You have a deep cut, or a cut that bleeds longer than 10 minutes   o You have a bad cut, hard fall, accident or hit your head (go to urgent care or the emergency room).    For women who can get pregnant: This medicine can harm an unborn baby. Be very careful not to get pregnant while taking this medicine. If you think you might be pregnant, call your doctor right away.    For more information, read \"Guide to Warfarin Therapy,  the booklet you received in the hospital.        Pending Results     No orders found from 2017 to 2017.            Statement of Approval     Ordered          17 1002  I have reviewed and agree with all the recommendations and orders detailed in this document.  EFFECTIVE NOW     Approved and electronically signed by:  Nash Amado PA             Admission Information     Date & Time Department Dept. Phone    6/15/2017 [address] [facility] [phone]      Your Vitals Were     Blood Pressure Pulse Temperature Respirations Height Weight    88/74 105 98.8  F (37.1  C) (Oral) 16 1.727 m (5' 8\") 113.5 kg (250 lb 3.2 oz)    Pulse Oximetry BMI (Body Mass Index)                96% 38.04 kg/m2          MyChart Information     [employer] lets you send messages to your doctor, view your test results, renew your prescriptions, schedule appointments and more. To sign up, go to [email or web address] . Click on \"Log in\" on the left side of the screen, which will take you to the Welcome page. Then click on \"Sign up Now\" on the right side of the page.     You will be asked to enter the access code listed below, as well as some personal information. Please follow the directions to create your username and password.     Your access code is: [email or web address]  Expires: 2017  6:30 AM     Your access code will  in 90 days. If you need help or a new code, please call your " [facility] or [phone].        Care EveryWhere ID     This is your Care EveryWhere ID. This could be used by other organizations to access your [place] medical records  [id number]        Equal Access to Services     JERROD VELASQUEZ : Hadii aad ku haddorotacarli Somadihaali, waaxda luqadaha, qaybta kaalmada adeegyada, kendra osunajen senamichael millerlinda ely. So [facility] [phone].    ATENCIÓN: Si habla español, tiene a roger disposición servicios gratuitos de asistencia lingüística. Llame al [phone].    We comply with applicable federal civil rights laws and [place] laws. We do not discriminate on the basis of race, color, national origin, age, disability sex, sexual orientation or gender identity.               Review of your medicines      START taking        Dose / Directions    acetaminophen 325 MG tablet   Commonly known as:  TYLENOL   Used for:  Acute post-operative pain        Dose:  650 mg   Take 2 tablets (650 mg) by mouth every 6 hours   Quantity:  100 tablet   Refills:  0       aspirin 81 MG chewable tablet   Used for:  LVAD (left ventricular assist device) present (H)        Dose:  81 mg   1 tablet (81 mg) by Oral or Feeding Tube route daily   Quantity:  36 tablet   Refills:  0       HYDROmorphone 2 MG tablet   Commonly known as:  DILAUDID   Used for:  Acute post-operative pain        Dose:  2-4 mg   Take 1-2 tablets (2-4 mg) by mouth every 3 hours as needed for moderate to severe pain   Refills:  0       metoprolol 25 MG tablet   Commonly known as:  LOPRESSOR   Used for:  LVAD (left ventricular assist device) present (H), Paroxysmal atrial fibrillation (H)        Dose:  25 mg   Take 1 tablet (25 mg) by mouth 2 times daily   Quantity:  60 tablet   Refills:  0       pantoprazole 40 MG EC tablet   Commonly known as:  PROTONIX   Used for:  Acute post-operative pain        Dose:  40 mg   Take 1 tablet (40 mg) by mouth every morning   Quantity:  30 tablet   Refills:  0       polyethylene glycol Packet    Commonly known as:  MIRALAX/GLYCOLAX   Used for:  Acute post-operative pain        Dose:  17 g   Take 17 g by mouth daily as needed for constipation (for no bm in 1 day)   Quantity:  7 packet   Refills:  0       potassium chloride SA 20 MEQ CR tablet   Commonly known as:  K-DUR/KLOR-CON M   Used for:  LVAD (left ventricular assist device) present (H)        Dose:  20 mEq   Take 1 tablet (20 mEq) by mouth 2 times daily   Quantity:  90 tablet   Refills:  0       senna-docusate 8.6-50 MG per tablet   Commonly known as:  SENOKOT-S;PERICOLACE   Used for:  Acute post-operative pain        Dose:  2 tablet   Take 2 tablets by mouth 2 times daily   Quantity:  100 tablet   Refills:  0       traZODone 50 MG tablet   Commonly known as:  DESYREL   Used for:  Acute post-operative pain, LVAD (left ventricular assist device) present (H)        Dose:  50 mg   Take 1 tablet (50 mg) by mouth At Bedtime   Quantity:  90 tablet   Refills:  0       warfarin 2 MG tablet   Commonly known as:  COUMADIN   Used for:  LVAD (left ventricular assist device) present (H)        Take 4 mg PO daily until next INR check. INR goal 2-3.   Quantity:  30 tablet   Refills:  0         CONTINUE these medicines which may have CHANGED, or have new prescriptions. If we are uncertain of the size of tablets/capsules you have at home, strength may be listed as something that might have changed.        Dose / Directions    * bumetanide 1 MG tablet   Commonly known as:  BUMEX   This may have changed:  when to take this   Used for:  LVAD (left ventricular assist device) present (H)        Dose:  1 mg   Take 1 tablet (1 mg) by mouth daily   Quantity:  30 tablet   Refills:  0       * bumetanide 2 MG tablet   Commonly known as:  BUMEX   This may have changed:  You were already taking a medication with the same name, and this prescription was added. Make sure you understand how and when to take each.   Used for:  LVAD (left ventricular assist device) present (H)         Dose:  2 mg   Start taking on:  7/1/2017   Take 1 tablet (2 mg) by mouth daily   Refills:  0       metFORMIN 500 MG tablet   Commonly known as:  GLUCOPHAGE   This may have changed:    - medication strength  - how much to take  - when to take this   Used for:  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, without long-term current use of insulin, unspecified CKD stage (H)        Dose:  250 mg   Take 0.5 tablets (250 mg) by mouth 2 times daily (with meals)   Quantity:  60 tablet   Refills:  0       traMADol 50 MG tablet   Commonly known as:  ULTRAM   This may have changed:  reasons to take this   Used for:  Acute post-operative pain        Dose:  100 mg   Take 2 tablets (100 mg) by mouth every 6 hours as needed for moderate pain or breakthrough pain   Quantity:  28 tablet   Refills:  0       * Notice:  This list has 2 medication(s) that are the same as other medications prescribed for you. Read the directions carefully, and ask your doctor or other care provider to review them with you.      CONTINUE these medicines which have NOT CHANGED        Dose / Directions    albuterol 108 (90 BASE) MCG/ACT Inhaler   Generic drug:  albuterol        Dose:  2 puff   Inhale 2 puffs into the lungs every 4 hours as needed for shortness of breath / dyspnea or wheezing   Refills:  0       BREO ELLIPTA 200-25 MCG/INH oral inhaler   Generic drug:  fluticasone-vilanterol        Dose:  1 puff   Inhale 1 puff into the lungs daily   Refills:  0       celeXA 20 MG tablet   Generic drug:  citalopram        Dose:  20 mg   Take 20 mg by mouth daily   Refills:  0       cyanocobalamin 1000 MCG/ML injection   Commonly known as:  VITAMIN B12        Dose:  1000 mcg   Inject 1,000 mcg into the muscle every 30 days   Refills:  0       diclofenac 1 % Gel topical gel   Commonly known as:  VOLTAREN   Used for:  Left elbow pain        Dose:  2 g   Apply 2 g topically 4 times daily as needed for moderate pain   Quantity:  100 g    Refills:  0       INCRUSE ELLIPTA 62.5 MCG/INH oral inhaler   Generic drug:  umeclidinium        Dose:  1 puff   Inhale 1 puff into the lungs daily   Refills:  0       ipratropium - albuterol 0.5 mg/2.5 mg/3 mL 0.5-2.5 (3) MG/3ML neb solution   Commonly known as:  DUONEB        Dose:  3 mL   Take 3 mLs by nebulization every 4 hours as needed for shortness of breath / dyspnea or wheezing   Refills:  0       SINGULAIR 10 MG tablet   Generic drug:  montelukast        Dose:  10 mg   Take 10 mg by mouth At Bedtime   Refills:  0       zinc sulfate 220 (50 ZN) MG capsule   Commonly known as:  ZINCATE        Dose:  220 mg   Take 220 mg by mouth 2 times daily   Refills:  0         STOP taking     allopurinol 100 MG tablet   Commonly known as:  ZYLOPRIM           BENADRYL 25 MG capsule   Generic drug:  diphenhydrAMINE           colchicine 0.6 MG tablet           docusate sodium 100 MG capsule   Commonly known as:  COLACE           hydrALAZINE 10 MG tablet   Commonly known as:  APRESOLINE           oxyCODONE 5 MG/5ML solution   Commonly known as:  ROXICODONE           sennosides 8.6 MG tablet   Commonly known as:  SENOKOT                Where to get your medicines      Some of these will need a paper prescription and others can be bought over the counter. Ask your nurse if you have questions.     You don't need a prescription for these medications     acetaminophen 325 MG tablet    aspirin 81 MG chewable tablet    bumetanide 1 MG tablet    bumetanide 2 MG tablet    metFORMIN 500 MG tablet    metoprolol 25 MG tablet    pantoprazole 40 MG EC tablet    polyethylene glycol Packet    potassium chloride SA 20 MEQ CR tablet    senna-docusate 8.6-50 MG per tablet    traZODone 50 MG tablet    warfarin 2 MG tablet         Information about where to get these medications is not yet available     ! Ask your nurse or doctor about these medications     HYDROmorphone 2 MG tablet    traMADol 50 MG tablet                Protect others around  you: Learn how to safely use, store and throw away your medicines at www.disposemymeds.org.             Medication List: This is a list of all your medications and when to take them. Check marks below indicate your daily home schedule. Keep this list as a reference.      Medications           Morning Afternoon Evening Bedtime As Needed    acetaminophen 325 MG tablet   Commonly known as:  TYLENOL   Take 2 tablets (650 mg) by mouth every 6 hours   Last time this was given:  650 mg on 6/30/2017  8:16 AM                                albuterol 108 (90 BASE) MCG/ACT Inhaler   Inhale 2 puffs into the lungs every 4 hours as needed for shortness of breath / dyspnea or wheezing   Last time this was given:  6 puffs on 6/21/2017  7:51 AM   Generic drug:  albuterol                                aspirin 81 MG chewable tablet   1 tablet (81 mg) by Oral or Feeding Tube route daily   Last time this was given:  81 mg on 6/30/2017  8:16 AM                                BREO ELLIPTA 200-25 MCG/INH oral inhaler   Inhale 1 puff into the lungs daily   Last time this was given:  1 puff on 6/30/2017  8:19 AM   Generic drug:  fluticasone-vilanterol                                * bumetanide 1 MG tablet   Commonly known as:  BUMEX   Take 1 tablet (1 mg) by mouth daily   Last time this was given:  1 mg on 6/30/2017  8:17 AM                                * bumetanide 2 MG tablet   Commonly known as:  BUMEX   Take 1 tablet (2 mg) by mouth daily   Start taking on:  7/1/2017   Last time this was given:  1 mg on 6/30/2017  8:17 AM                                celeXA 20 MG tablet   Take 20 mg by mouth daily   Last time this was given:  20 mg on 6/30/2017  8:17 AM   Generic drug:  citalopram                                cyanocobalamin 1000 MCG/ML injection   Commonly known as:  VITAMIN B12   Inject 1,000 mcg into the muscle every 30 days                                diclofenac 1 % Gel topical gel   Commonly known as:  VOLTAREN   Apply 2 g  topically 4 times daily as needed for moderate pain                                HYDROmorphone 2 MG tablet   Commonly known as:  DILAUDID   Take 1-2 tablets (2-4 mg) by mouth every 3 hours as needed for moderate to severe pain   Last time this was given:  4 mg on 6/30/2017  8:15 AM                                INCRUSE ELLIPTA 62.5 MCG/INH oral inhaler   Inhale 1 puff into the lungs daily   Last time this was given:  1 puff on 6/18/2017  8:39 AM   Generic drug:  umeclidinium                                ipratropium - albuterol 0.5 mg/2.5 mg/3 mL 0.5-2.5 (3) MG/3ML neb solution   Commonly known as:  DUONEB   Take 3 mLs by nebulization every 4 hours as needed for shortness of breath / dyspnea or wheezing   Last time this was given:  3 mLs on 6/30/2017 11:06 AM                                metFORMIN 500 MG tablet   Commonly known as:  GLUCOPHAGE   Take 0.5 tablets (250 mg) by mouth 2 times daily (with meals)   Last time this was given:  250 mg on 6/30/2017  8:30 AM                                metoprolol 25 MG tablet   Commonly known as:  LOPRESSOR   Take 1 tablet (25 mg) by mouth 2 times daily   Last time this was given:  25 mg on 6/30/2017  8:18 AM                                pantoprazole 40 MG EC tablet   Commonly known as:  PROTONIX   Take 1 tablet (40 mg) by mouth every morning   Last time this was given:  40 mg on 6/30/2017  8:17 AM                                polyethylene glycol Packet   Commonly known as:  MIRALAX/GLYCOLAX   Take 17 g by mouth daily as needed for constipation (for no bm in 1 day)   Last time this was given:  17 g on 6/27/2017  7:52 AM                                potassium chloride SA 20 MEQ CR tablet   Commonly known as:  K-DUR/KLOR-CON M   Take 1 tablet (20 mEq) by mouth 2 times daily   Last time this was given:  20 mEq on 6/30/2017  8:18 AM                                senna-docusate 8.6-50 MG per tablet   Commonly known as:  SENOKOT-S;PERICOLACE   Take 2 tablets by mouth  2 times daily   Last time this was given:  2 tablets on 6/29/2017  8:27 AM                                SINGULAIR 10 MG tablet   Take 10 mg by mouth At Bedtime   Last time this was given:  10 mg on 6/29/2017  9:08 PM   Generic drug:  montelukast                                traMADol 50 MG tablet   Commonly known as:  ULTRAM   Take 2 tablets (100 mg) by mouth every 6 hours as needed for moderate pain or breakthrough pain   Last time this was given:  50 mg on 6/30/2017 10:46 AM                                traZODone 50 MG tablet   Commonly known as:  DESYREL   Take 1 tablet (50 mg) by mouth At Bedtime   Last time this was given:  50 mg on 6/29/2017  9:08 PM                                warfarin 2 MG tablet   Commonly known as:  COUMADIN   Take 4 mg PO daily until next INR check. INR goal 2-3.   Last time this was given:  5 mg on 6/29/2017  6:09 PM                                zinc sulfate 220 (50 ZN) MG capsule   Commonly known as:  ZINCATE   Take 220 mg by mouth 2 times daily   Last time this was given:  220 mg on 6/18/2017  7:52 PM                                * Notice:  This list has 2 medication(s) that are the same as other medications prescribed for you. Read the directions carefully, and ask your doctor or other care provider to review them with you.

## 2017-06-15 NOTE — H&P
[facility]   Cardiology II Service / Advanced Heart Failure  History & Physical    Jim Willingham  : 1955  MRN # [id number]    ADMIT DATE: 6/15/2017    CHIEF COMPLAINT: gradual shortness of breath since last night    HPI: Jim Willingham is a 62 year old gentleman with a long h/o NICM (EF20%, dilated LV, severe MR), asthma, COPD, CKD, DM2, CECILIA, MDD, presents with acute on chronic hypoxic respiratory failure. Recently admitted in ambulatory cardiogenic shock with low MAPs and WALTER on CKD. SCr decreased appropriately and MAPs recovered. Pt discharged without losartan and half original dose of bumex. The next evening (ie last evening), pt began noticing progressive shortness of breath without exertion. Had difficulty sleeping. Used CPAP as usual. Was sleeping flat on back. Awoke around 3am and couldn't fall back asleep until about 7am. Slept until about 8:30. Got up, but continued on CPAP. Was originally scheduled to see Dr. Montgomery midday, but felt that he couldn't wait until then. Called EMS, who brought pt to [facility] ED.     Separately, pt's L elbow, which had become acutely painful last admission, is now feeling significantly better. In addition to the methylpred injection pt received in his elbow by rheum last admission, pt took a dose of prednisone 20 at home the next day.     Pt otherwise denied headaches, acute vision changes, cough, sore throat, chest pain, acute shortness of breath, abd pain, diarrhea, constipation, burning urination or new joint pains.     Of note, LVAD placement scheduled .     ROS:   12-pt ROS otherwise negative except as noted above    PMH:  Past Medical History:   Diagnosis Date     Benign essential hypertension 2017     Cardiomyopathy, unspecified (H) 2017     CKD (chronic kidney disease) stage 3, GFR 30-59 ml/min 2017     Depression 2017     Diabetes mellitus (H) 2017     H/O gastric bypass 2017     ICD (implantable  cardioverter-defibrillator), biventricular, in situ 5/11/2017     NICM (nonischemic cardiomyopathy) (H)/ EF 20% 5/11/2017    ECHO: LVEDd. 7.66 cm, Restrictive pattern , Severe mitral valve regurgitation     CECILIA (obstructive sleep apnea) 5/11/2017     Paroxysmal atrial fibrillation (H) 5/11/2017     Paroxysmal VT (H) 5/11/2017     Vitamin B12 deficiency (non anemic) 5/11/2017       PSH:  Past Surgical History:   Procedure Laterality Date     GI SURGERY  2003    [facility]     ORTHOPEDIC SURGERY  1994    right knee wired       MEDICATIONS:  Prior to Admission Medications   Prescriptions Last Dose Informant Patient Reported? Taking?   albuterol (ALBUTEROL) 108 (90 BASE) MCG/ACT Inhaler 6/15/2017 at AM  Yes Yes   Sig: Inhale 2 puffs into the lungs every 4 hours as needed for shortness of breath / dyspnea or wheezing   allopurinol (ZYLOPRIM) 100 MG tablet 6/15/2017 at AM  No Yes   Sig: Take 0.5 tablets (50 mg) by mouth daily for 14 days   bumetanide (BUMEX) 1 MG tablet 6/15/2017 at AM  No Yes   Sig: Take 1 tablet (1 mg) by mouth 2 times daily for 7 days   citalopram (CELEXA) 20 MG tablet 6/15/2017 at AM  Yes Yes   Sig: Take 20 mg by mouth daily   colchicine 0.6 MG tablet 6/15/2017 at AM  No Yes   Sig: Take 1 tablet (0.6 mg) by mouth every other day   Patient taking differently: Take 0.6 mg by mouth daily    cyanocobalamin (VITAMIN B12) 1000 MCG/ML injection Past Month at Unknown time  Yes Yes   Sig: Inject 1,000 mcg into the muscle every 30 days   diclofenac (VOLTAREN) 1 % GEL topical gel Past Week at Unknown time  No Yes   Sig: Apply 2 g topically 4 times daily as needed for moderate pain   diphenhydrAMINE (BENADRYL) 25 MG capsule More than a month at Unknown time  Yes No   Sig: Take 25 mg by mouth nightly as needed for itching or allergies   docusate sodium (COLACE) 100 MG capsule 6/15/2017 at AM  No Yes   Sig: Take 1 capsule (100 mg) by mouth 2 times daily   fluticasone-vilanterol (BREO ELLIPTA) 200-25 MCG/INH oral  "inhaler 6/15/2017 at AM  Yes Yes   Sig: Inhale 1 puff into the lungs daily   hydrALAZINE (APRESOLINE) 10 MG tablet Not Taking at Unknown time  No No   Sig: Take 1 tablet (10 mg) by mouth 3 times daily   Patient not taking: Reported on 6/15/2017   ipratropium - albuterol 0.5 mg/2.5 mg/3 mL (DUONEB) 0.5-2.5 (3) MG/3ML neb solution 6/15/2017 at AM  Yes Yes   Sig: Take 3 mLs by nebulization every 4 hours as needed for shortness of breath / dyspnea or wheezing   metFORMIN (GLUCOPHAGE) 1000 MG tablet 6/11/2017  Yes Yes   Sig: Take 1,000 mg by mouth daily (with dinner)   montelukast (SINGULAIR) 10 MG tablet 6/14/2017 at PM  Yes Yes   Sig: Take 10 mg by mouth At Bedtime   oxyCODONE (ROXICODONE) 5 MG/5ML solution 6/14/2017 at PM  No Yes   Sig: Take 2.5 mLs (2.5 mg) by mouth every 4 hours as needed (elbow pain)   sennosides (SENOKOT) 8.6 MG tablet 6/15/2017 at AM  No Yes   Sig: Take 1 tablet by mouth daily   traMADol (ULTRAM) 50 MG tablet Past Week at Unknown time  Yes Yes   Sig: Take 100 mg by mouth every 6 hours as needed for moderate pain   umeclidinium (INCRUSE ELLIPTA) 62.5 MCG/INH oral inhaler 6/15/2017 at AM  Yes Yes   Sig: Inhale 1 puff into the lungs daily   zinc sulfate (ZINCATE) 220 (50 ZN) MG capsule 6/15/2017 at AM  Yes Yes   Sig: Take 220 mg by mouth 2 times daily      Facility-Administered Medications: None        ALLERGIES:     Allergies   Allergen Reactions     Ace Inhibitors Unknown     Penicillins      Sulfa Drugs Unknown       FAMILY HISTORY:  Father - \"silent MI\" as early as 40s, but only recently dx with heart failure, now aged 87  Mother - passed away from CVA, had about 9 CVAs total  2 brothers and 1 sister without significant cardiac dz    SOCIAL HISTORY:  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 rate 24, height 1.727 m (5' 8\"), weight 114.8 kg (253 lb), SpO2 98 %.  GENERAL: Appears alert and oriented times three. BiPAP mask on  HEENT: Eye symmetrical and free of discharge bilaterally. Mucous membranes moist and without lesions.  NECK: Supple and without lymphadenopathy. Unable to assess JVD with BiPAP.   CV: S1/S2 heard without murmur   RESPIRATORY: coarse breath sounds, unable to fully assess with BiPAP in place  GI: Soft and mildly distended with normoactive bowel sounds present in all quadrants. No tenderness, rebound, guarding. No palpable organomegaly.   EXTREMITIES: No peripheral edema. 2+ bilateral pedal pulses.   NEUROLOGIC: Alert and orientated x 3. CN II-XII grossly intact. No focal deficits.   MUSCULOSKELETAL: Mild swelling of L elbow, mild tenderness  SKIN: No jaundice. No rashes or lesions.     LABS:  CBC:  Recent Labs   Lab Test  06/15/17   1155   WBC  10.8   RBC  3.61*   HGB  10.7*   HCT  33.6*   MCV  93   MCH  29.6   MCHC  31.8   RDW  13.8   PLT  215       CMP:  Recent Labs   Lab Test  06/15/17   1155   NA  139   POTASSIUM  4.0   CHLORIDE  103   QAMAR  9.0   CO2  29   BUN  33*   CR  1.41*   GLC  109*   AST  23   ALT  26   BILITOTAL  0.8   ALBUMIN  3.8   PROTTOTAL  7.3   ALKPHOS  72       IMAGING:    ASSESSMENT & PLAN:  62M h/o NICM (EF 20%) s/p Bi-V ICD, severe MR, VT, paroxsymal afib, DM, HTN, CKD, CECILIA, MDD, admitted for gradual shortness of breath, likely 2/2 to inadequate maintenance diuretics. Improving with BiPAP. Will keep inpt for upcoming LVAD.      Acute hypoxic respiratory failure  - currently stable on BiPAP  - pt adherent to CPAP at night  - current decompensation likely 2/2 inadequate maintenance diuresis following halving home " diuretics vs recent steroid use for acute gout increasing Na retention  - holding PO prednisone, L elbow already injected with methylpred  - more likely volume overload  - admission weight 253, last DC weight 2d ago was 261, however weights obtained on different floors, thus unreliable for assessing vol status  - JVP and IVC hard to assess when pt on BiPAP  - with SCr now baseline, will increase diuresis to 1.5x PTA diuretic dose via IV lasix    - PTA was bumex 1 PO BID, now lasix 40 IV TID  - if hypoxic does not resolve, can consider repeat RHC vs TTE     NICM (EF20%)  Combined Systolic Diastolic Heart Failure  - etiology likely 2/2 to chronic alcoholism vs amyloidosis vs ICM  - clean cor angio in past, unlikely ICM  - not had w/u for amyloidosis, but without significant family hx of cardiomyopathy  - significant EtOH strongly suggests etiology of heart failure  - LVAD implantation scheduled for next Monday, 6/19  - holding metoprolol, digoxin, spironolactone and losartan for now, due to LVAD placement soon  - holding warfarin due to upcoming surgery  - will refrain from bridging with heparin gtt as well ([id number] = 3)      Acute gout  - recent dx per rheum  - cont allopurinol and colchicine  - will f/u outpt with uric acid to titrate allopurinol to keep uric acid level below 6    CKD  - recent WALTER  - baseline SCr between 1.4-1.5  - SCr baseline on admission  - likely 2/2 DM2 and HF  - trend SCr     Chronic Conditions  H/o of ventricular tachycardia: [employer] ICD interrogated, no events since 3 weeks ago, continue amiodarone 100 mg qday  Paroxsymal Afib: [id number] = 3, continue amiodarone 100 mg qday  Asthma, COPD: continue PTA umeclidinium, singulair, scheduling duonebs q4 while awake for now, decrease as tolerated  - needs repeat PFTs at some point  DM: holding oral antiglycemics, SSI ordered   CECILIA: continue CPAP on home settings  Depression: continue home citalopram 20 mg qday     FEN: 2 gram sodium  diet  PROPHY: mechanical, already on warfarin  LINES:  peripheral IV  DISPO:  inpt admission for ambulatory cardiogenic shock and LVAD placement  CODE STATUS:  full code    Pt discussed with attending physician, Dr. Duran Mccarty MD.     Martínez Locke MD, PhD  Internal Medicine PGY-1  [phone]  6/15/2017    Patient readmitted within 24 hours of discharge for acutely decompensated congestive heart failure IIID.  Now in definitive fashion will need to move up LVAD insertion.

## 2017-06-15 NOTE — ED NOTES
Pt biba fro home  H/o cardiomyopathy, CHF, COPD, asthma, afib, VT, ICD placement  Scheduled to have LVAD placed on Monday, apparently decreasing his diuretics per EMS  C/o SOB and RESENDIZ  Fine last NOC, this AM had issues walking to BR  No CP  Used Neb with no relief  Placed on CPAP 10, Fi  O2 100% by EMS  Reports near-full relief with CPAP  RT paged upon arrival and placed on BiPap 10/5 FiO2 60%

## 2017-06-15 NOTE — ED NOTES
"ED to Floor Handoff      S:  Jim Willingham is a 62 year old male who speaks English and lives with a spouse,  in a home  They arrived in the ED by ambulance from emergency room with a complaint of Shortness of Breath    Initial vitals were:   BP: 116/88  Pulse: 99  Heart Rate: 98  Temp: 97.6  F (36.4  C)  Resp: 19  Height: 172.7 cm (5' 8\")  Weight: 114.8 kg (253 lb)  SpO2: 99 %  Allergies:   Allergies   Allergen Reactions     Ace Inhibitors Unknown     Penicillins      Sulfa Drugs Unknown   .  The meds given in the ED and their home medications are:   No current facility-administered medications for this encounter.      Current Outpatient Prescriptions   Medication     bumetanide (BUMEX) 1 MG tablet     allopurinol (ZYLOPRIM) 100 MG tablet     [START ON 6/28/2017] allopurinol (ZYLOPRIM) 100 MG tablet     colchicine 0.6 MG tablet     oxyCODONE (ROXICODONE) 5 MG/5ML solution     docusate sodium (COLACE) 100 MG capsule     sennosides (SENOKOT) 8.6 MG tablet     diclofenac (VOLTAREN) 1 % GEL topical gel     hydrALAZINE (APRESOLINE) 10 MG tablet     albuterol (ALBUTEROL) 108 (90 BASE) MCG/ACT Inhaler     ipratropium - albuterol 0.5 mg/2.5 mg/3 mL (DUONEB) 0.5-2.5 (3) MG/3ML neb solution     citalopram (CELEXA) 20 MG tablet     cyanocobalamin (VITAMIN B12) 1000 MCG/ML injection     diphenhydrAMINE (BENADRYL) 25 MG capsule     fluticasone-vilanterol (BREO ELLIPTA) 200-25 MCG/INH oral inhaler     metFORMIN (GLUCOPHAGE) 1000 MG tablet     montelukast (SINGULAIR) 10 MG tablet     traMADol (ULTRAM) 50 MG tablet     umeclidinium (INCRUSE ELLIPTA) 62.5 MCG/INH oral inhaler     zinc sulfate (ZINCATE) 220 (50 ZN) MG capsule     Social demographics are   Social History     Social History     Marital status:      Spouse name: N/A     Number of children: N/A     Years of education: N/A     Social History Main Topics     Smoking status: Former Smoker     Quit date: 1995     Smokeless tobacco: Not on file     Alcohol use No     " "Drug use: Not on file     Sexual activity: Yes     Partners: Female     Other Topics Concern     Parent/Sibling W/ Cabg, Mi Or Angioplasty Before 65f 55m? No     Social History Narrative       B:   The patient has been ill for 1 day(s) and during this time the symptoms have increased.  In the ED was diagnosed with   Final diagnoses:   Heart failure (H)    Infection/sepsis suspected:No Isolation type; No active isolations   A:   In the ED these meds were given: Medications - No data to display  Drips running?  No  Labs results   Labs Ordered and Resulted from Time of ED Arrival Up to the Time of Departure from the ED   CBC WITH PLATELETS DIFFERENTIAL - Abnormal; Notable for the following:        Result Value    RBC Count 3.61 (*)     Hemoglobin 10.7 (*)     Hematocrit 33.6 (*)     All other components within normal limits   COMPREHENSIVE METABOLIC PANEL - Abnormal; Notable for the following:     Glucose 109 (*)     Urea Nitrogen 33 (*)     Creatinine 1.41 (*)     GFR Estimate 51 (*)     All other components within normal limits   NT PROBNP INPATIENT - Abnormal; Notable for the following:     N-Terminal Pro BNP Inpatient 4216 (*)     All other components within normal limits   INR   ISTAT  GASES LACTATE KP POCT     Imaging Studies:   Recent Results (from the past 24 hour(s))   Chest XR,  PA & LAT    Narrative    EXAM: XR CHEST 2 VW  6/15/2017 12:45 PM     HISTORY: Dyspnea       COMPARISON: Chest radiograph 5/23/2017    FINDINGS: Upright PA and lateral views of chest. Cardio defibrillator  device is unchanged. Slightly worsening of perihilar and bibasilar  opacities. Increasing fluid vasculature Slight thickening of the right  minor fissure. No pneumothorax. No significant pleural effusion.       Impression    IMPRESSION: Slight worsening of perihilar and bibasilar opacities,  likely pulmonary edema.     Recent vital signs /88  Pulse 99  Temp 97.6  F (36.4  C) (Axillary)  Resp 13  Ht 1.727 m (5' 8\")  Wt " 114.8 kg (253 lb)  SpO2 100%  BMI 38.47 kg/m2  Cardiac Rhythm: ,   Cardiac  Cardiac Rhythm: Ventricular paced  Abnormal labs/tests/findings requiring intervention:---  Pain control: pt had none  Nausea control: pt had none  R:   Transfer assistance needed: Stand with Assist  Family present during ED course? Yes   Family currently present? Yes  Pt needs tele? Yes  Code Status: Full Code  Tasks needing to be completed:---    Cristhian lin-- [phone] [facility]  [phone] [facility]

## 2017-06-15 NOTE — ED NOTES
Bed: ED19  Expected date:   Expected time:   Means of arrival:   Comments:  EMS  --  Initial Information:  RAD[facility]    EMS Rig:  RADHA 631    EMS-ACUITY:  YELLOW    MED / TRAUMA C/o:  RESENDIZ / SOB-SOA / HX:  CHF    AGE / GENDER:  62-yr Male    ETA to ED:  1130am    SPECIAL CONSIDERATIONS:  EMS has patient on CPaP; page RT on arrival for possible BiPaP       --Emergency / T-HOLD:       --LAW ENFORCEMENT / SECURITY:       --FAMILY PRESENCE / MINOR / CUSTODY:       --FLIGHT:

## 2017-06-15 NOTE — IP AVS SNAPSHOT
[address] [address]    Phone:  [phone]                                       After Visit Summary   6/15/2017    Jim Willingham    MRN: [id number]           After Visit Summary Signature Page     I have received my discharge instructions, and my questions have been answered. I have discussed any challenges I see with this plan with the nurse or doctor.    ..........................................................................................................................................  Patient/Patient Representative Signature      ..........................................................................................................................................  Patient Representative Print Name and Relationship to Patient    ..................................................               ................................................  Date                                            Time    ..........................................................................................................................................  Reviewed by Signature/Title    ...................................................              ..............................................  Date                                                            Time

## 2017-06-15 NOTE — PLAN OF CARE
Problem: Goal Outcome Summary  Goal: Goal Outcome Summary  Transfer  Transferred from: [facility]  Via:bed  Reason for transfer:Pt appropriate for 6C- improved patient condition  Family: Aware of transfer  Belongings: Sent with pt  Chart: Sent with pt  Medications: Meds from bin sent with pt  Report called from:

## 2017-06-15 NOTE — PROGRESS NOTES
Patient has clinic visit today 6/15 so no post DC follow up call is needed          Maximus 15, 2017  8:30 AM CDT   Card Cardpul Stress Tst Adult with [facility]   [facility] ([facility] [place])     [address]  [facility] [address]                     Maximus 15, 2017 11:45 AM CDT   Lab with  LAB   [facility] ([facility])     [address] [place] [address]  1st [facility] [id number] [phone]                Maximus 15, 2017 12:30 PM CDT   (Arrive by 12:15 PM)   RETURN HEART FAILURE with Delisa Montgomery MD   [facility] ([facility])     [address] [place] [address]  3rd [facility] [id number] [phone]

## 2017-06-15 NOTE — ED PROVIDER NOTES
[place] EMERGENCY DEPARTMENT ([facility])  Freda 15, 2017    History     Chief Complaint   Patient presents with     Shortness of Breath     The history is provided by the patient.     Jim Willingham is a 61 y/o M complicated pmhx including NICM, COPD, advanced chronic HF with EF 20%, afib, T2DM, stage 3 CKD, afib with pacer/AICD  Had recent hospitalization 6/11-6/13 after having abnl outpatient labs. Pt directly admitted to hospital for acutely decompensated heart failure with hypotension, ambulatory cardiogenic shock, acute on chronic kidney injury. Pt's LVAD procedure was expedited from 6/26 to 6/19, coumadin held in anticipation of LVAD placement. Pt dced to home w/ plan to continue amiodarone, hold digoxin, losartan, toprol, spironolactone, restart bumex and hydralazine.   Last night pt developed increasing dyspnea.     At that time pt was walking to bathroom when he noticed feeling quite short of breath. Pt used restroom and went back to bed. He tried Nebs Q4h since 2am w some improvement.   However dyspnea persisted and he felt unable to make it to scheduled clinic appointment later today and finally called EMS this morning.  They placed him on BiPAP on 100% FiO2 w some improvement. This was decreased to 35% FiO2 in ED. He continues to feel relief fr the SOA on BiPAP. He is on nightly/PRN CPAP     Patient is not O2 dependent at home.      I have reviewed the Medications, Allergies, Past Medical and Surgical History, and Social History in the [facility] system.  PAST MEDICAL HISTORY:   Past Medical History:   Diagnosis Date     Benign essential hypertension 5/11/2017     Cardiomyopathy, unspecified (H) 5/8/2017     CKD (chronic kidney disease) stage 3, GFR 30-59 ml/min 5/11/2017     Depression 5/11/2017     Diabetes mellitus (H) 5/11/2017     H/O gastric bypass 5/11/2017     ICD (implantable cardioverter-defibrillator), biventricular, in situ 5/11/2017     NICM (nonischemic cardiomyopathy) (H)/ EF 20%  "5/11/2017    ECHO: LVEDd. 7.66 cm, Restrictive pattern , Severe mitral valve regurgitation     CECILIA (obstructive sleep apnea) 5/11/2017     Paroxysmal atrial fibrillation (H) 5/11/2017     Paroxysmal VT (H) 5/11/2017     Vitamin B12 deficiency (non anemic) 5/11/2017       Review of Systems   Respiratory: Positive for shortness of breath.        Physical Exam   BP: 116/88  Pulse: 99  Temp: 97.6  F (36.4  C)  Resp: 19  Height: 172.7 cm (5' 8\")  Weight: 114.8 kg (253 lb)  SpO2: 99 %  Physical Exam     GEN: Well appearing, non toxic, cooperative and conversant.   HEENT: The head is normocephalic and atraumatic. Pupils are equal round and reactive to light. Extraocular motions are intact. There is no facial swelling. The neck is nontender and supple.   CV: Regular rate and rhythm 2/6 FREDY.  2+ radial pulses bilaterally. Hands and feet warm, well perfused. JVD  PULM: Clear to auscultation bilaterally w/o basilar crackles   ABD: Soft, nontender, nondistended. Normal bowel sounds.   EXT: Full range of motion.  No edema.  NEURO: Cranial nerves II through XII are intact and symmetric. Bilateral upper and lower extremities grossly show full range of motion without any focal deficits.   SKIN: No rashes, ecchymosis, or lacerations  PSYCH: Calm and cooperative, interactive.     ED Course     ED Course     Procedures             EKG Interpretation:      Interpreted by Philipp Ross MD  Time reviewed: 1141  Symptoms at time of EKG: shortness of breath   Rhythm: atrial-sensed ventricular paced-rhythm  Rate: 97  Axis: normal  Ectopy: none  Conduction: normal  ST Segments/ T Waves: No ST-T wave changes  Q Waves: none  Comparison to prior: Unchanged from ECG on 11 June 2017    Clinical Impression: paced rhythm     Recent Results (from the past 24 hour(s))   Chest XR,  PA & LAT    Narrative    EXAM: XR CHEST 2 VW  6/15/2017 12:45 PM     HISTORY: Dyspnea       COMPARISON: Chest radiograph 5/23/2017    FINDINGS: Upright PA and lateral views " of chest. Cardio defibrillator  device is unchanged. Slightly worsening of perihilar and bibasilar  opacities. Increasing fluid vasculature Slight thickening of the right  minor fissure. No pneumothorax. No significant pleural effusion.       Impression    IMPRESSION: Slight worsening of perihilar and bibasilar opacities,  likely pulmonary edema.    I have personally reviewed the examination and initial interpretation  and I agree with the findings.    ILYA HOBBS MD              Labs Ordered and Resulted from Time of ED Arrival Up to the Time of Departure from the ED   CBC WITH PLATELETS DIFFERENTIAL - Abnormal; Notable for the following:        Result Value    RBC Count 3.61 (*)     Hemoglobin 10.7 (*)     Hematocrit 33.6 (*)     All other components within normal limits   COMPREHENSIVE METABOLIC PANEL   NT PROBNP INPATIENT   INR   ISTAT  GASES LACTATE KP POCT            Assessments & Plan (with Medical Decision Making)   The patient has extensive past medical history including severe nonischemic heart failure, COPD as noted above.  Symptoms with which he presents today most likely related to multifactorial etiologies, possibly related to COPD as well as CHF.  He is very tenuous given his low EF and chronic kidney disease.  Patient was discussed shortly after arrival with Dr. Delisa Montgomery who requested be admitted to cardiology. The patient will likely stay in the hospital until his LVAD is placed on Monday.  His labs are fairly unremarkable except for a BNP of 4,200, creatinine of 1.4 and an INR of 1.5.  His chest x-ray shows findings consistent with pulmonary edema but no evidence of infiltrate.  He is hemodynamically stable while in the ED. His BiPAP settings were changed from 10-15 inspiratory, as these reflected settings at home this resulted in significant improvement and resolution of dyspnea.  Will continue to monitor while in the ED    I have reviewed the nursing notes.  I have reviewed the  findings, diagnosis, plan and need for follow up with the patient.  This part of the document was transcribed by Amber Doshi Medical Scribe.  New Prescriptions    No medications on file       Final diagnoses:   Heart failure (H)     Laquita LEE, am serving as a trained medical scribe to document services personally performed by Philipp Ross MD, based on the provider's statements to me.  This document has been checked and approved by the attending provider.     Philipp LEE MD, was physically present and have reviewed and verified the accuracy of this note documented by Laquita Benitez medical scribe.      6/15/2017   [facility], [place], EMERGENCY DEPARTMENT     Philipp Ross MD  07/02/17 [id number]

## 2017-06-16 ENCOUNTER — APPOINTMENT (OUTPATIENT)
Dept: GENERAL RADIOLOGY | Facility: CLINIC | Age: 62
DRG: 001 | End: 2017-06-16
Attending: INTERNAL MEDICINE
Payer: COMMERCIAL

## 2017-06-16 LAB
ANION GAP SERPL CALCULATED.3IONS-SCNC: 5 MMOL/L (ref 3–14)
BASOPHILS # BLD AUTO: 0 10E9/L (ref 0–0.2)
BASOPHILS NFR BLD AUTO: 0.5 %
BUN SERPL-MCNC: 29 MG/DL (ref 7–30)
CALCIUM SERPL-MCNC: 9.1 MG/DL (ref 8.5–10.1)
CHLORIDE SERPL-SCNC: 104 MMOL/L (ref 94–109)
CO2 SERPL-SCNC: 29 MMOL/L (ref 20–32)
CREAT SERPL-MCNC: 1.48 MG/DL (ref 0.66–1.25)
DIFFERENTIAL METHOD BLD: ABNORMAL
EOSINOPHIL # BLD AUTO: 0.1 10E9/L (ref 0–0.7)
EOSINOPHIL NFR BLD AUTO: 1.6 %
ERYTHROCYTE [DISTWIDTH] IN BLOOD BY AUTOMATED COUNT: 13.7 % (ref 10–15)
GFR SERPL CREATININE-BSD FRML MDRD: 48 ML/MIN/1.7M2
GLUCOSE BLDC GLUCOMTR-MCNC: 105 MG/DL (ref 70–99)
GLUCOSE BLDC GLUCOMTR-MCNC: 108 MG/DL (ref 70–99)
GLUCOSE BLDC GLUCOMTR-MCNC: 121 MG/DL (ref 70–99)
GLUCOSE BLDC GLUCOMTR-MCNC: 141 MG/DL (ref 70–99)
GLUCOSE BLDC GLUCOMTR-MCNC: 92 MG/DL (ref 70–99)
GLUCOSE SERPL-MCNC: 111 MG/DL (ref 70–99)
HCT VFR BLD AUTO: 33.6 % (ref 40–53)
HGB BLD-MCNC: 10.6 G/DL (ref 13.3–17.7)
IMM GRANULOCYTES # BLD: 0 10E9/L (ref 0–0.4)
IMM GRANULOCYTES NFR BLD: 0.1 %
INR PPP: 1.51 (ref 0.86–1.14)
LYMPHOCYTES # BLD AUTO: 1.8 10E9/L (ref 0.8–5.3)
LYMPHOCYTES NFR BLD AUTO: 24 %
MCH RBC QN AUTO: 29.2 PG (ref 26.5–33)
MCHC RBC AUTO-ENTMCNC: 31.5 G/DL (ref 31.5–36.5)
MCV RBC AUTO: 93 FL (ref 78–100)
MONOCYTES # BLD AUTO: 0.7 10E9/L (ref 0–1.3)
MONOCYTES NFR BLD AUTO: 9.2 %
NEUTROPHILS # BLD AUTO: 4.8 10E9/L (ref 1.6–8.3)
NEUTROPHILS NFR BLD AUTO: 64.6 %
NRBC # BLD AUTO: 0 10*3/UL
NRBC BLD AUTO-RTO: 0 /100
PLATELET # BLD AUTO: 208 10E9/L (ref 150–450)
POTASSIUM SERPL-SCNC: 3.8 MMOL/L (ref 3.4–5.3)
RBC # BLD AUTO: 3.63 10E12/L (ref 4.4–5.9)
SODIUM SERPL-SCNC: 138 MMOL/L (ref 133–144)
WBC # BLD AUTO: 7.5 10E9/L (ref 4–11)

## 2017-06-16 PROCEDURE — 00000146 ZZHCL STATISTIC GLUCOSE BY METER IP

## 2017-06-16 PROCEDURE — 25000132 ZZH RX MED GY IP 250 OP 250 PS 637: Performed by: INTERNAL MEDICINE

## 2017-06-16 PROCEDURE — 94640 AIRWAY INHALATION TREATMENT: CPT | Mod: 76

## 2017-06-16 PROCEDURE — 25000125 ZZHC RX 250: Performed by: INTERNAL MEDICINE

## 2017-06-16 PROCEDURE — 25000132 ZZH RX MED GY IP 250 OP 250 PS 637: Performed by: STUDENT IN AN ORGANIZED HEALTH CARE EDUCATION/TRAINING PROGRAM

## 2017-06-16 PROCEDURE — 85610 PROTHROMBIN TIME: CPT | Performed by: STUDENT IN AN ORGANIZED HEALTH CARE EDUCATION/TRAINING PROGRAM

## 2017-06-16 PROCEDURE — 25000128 H RX IP 250 OP 636: Performed by: STUDENT IN AN ORGANIZED HEALTH CARE EDUCATION/TRAINING PROGRAM

## 2017-06-16 PROCEDURE — 40000274 ZZH STATISTIC RCP CONSULT EA 30 MIN

## 2017-06-16 PROCEDURE — 36415 COLL VENOUS BLD VENIPUNCTURE: CPT | Performed by: STUDENT IN AN ORGANIZED HEALTH CARE EDUCATION/TRAINING PROGRAM

## 2017-06-16 PROCEDURE — 25000131 ZZH RX MED GY IP 250 OP 636 PS 637: Performed by: STUDENT IN AN ORGANIZED HEALTH CARE EDUCATION/TRAINING PROGRAM

## 2017-06-16 PROCEDURE — 85025 COMPLETE CBC W/AUTO DIFF WBC: CPT | Performed by: STUDENT IN AN ORGANIZED HEALTH CARE EDUCATION/TRAINING PROGRAM

## 2017-06-16 PROCEDURE — 21400006 ZZH R&B CCU INTERMEDIATE UMMC

## 2017-06-16 PROCEDURE — 25000125 ZZHC RX 250: Performed by: STUDENT IN AN ORGANIZED HEALTH CARE EDUCATION/TRAINING PROGRAM

## 2017-06-16 PROCEDURE — 71010 XR CHEST PORT 1 VW: CPT

## 2017-06-16 PROCEDURE — 94640 AIRWAY INHALATION TREATMENT: CPT | Performed by: OPTOMETRIST

## 2017-06-16 PROCEDURE — 40000275 ZZH STATISTIC RCP TIME EA 10 MIN

## 2017-06-16 PROCEDURE — 80048 BASIC METABOLIC PNL TOTAL CA: CPT | Performed by: STUDENT IN AN ORGANIZED HEALTH CARE EDUCATION/TRAINING PROGRAM

## 2017-06-16 PROCEDURE — 70355 PANORAMIC X-RAY OF JAWS: CPT

## 2017-06-16 PROCEDURE — 40000275 ZZH STATISTIC RCP TIME EA 10 MIN: Performed by: OPTOMETRIST

## 2017-06-16 RX ORDER — IPRATROPIUM BROMIDE AND ALBUTEROL SULFATE 2.5; .5 MG/3ML; MG/3ML
3 SOLUTION RESPIRATORY (INHALATION) 4 TIMES DAILY
Status: DISCONTINUED | OUTPATIENT
Start: 2017-06-16 | End: 2017-06-20

## 2017-06-16 RX ORDER — FLUTICASONE PROPIONATE 50 MCG
1 SPRAY, SUSPENSION (ML) NASAL DAILY
Status: DISCONTINUED | OUTPATIENT
Start: 2017-06-16 | End: 2017-06-20

## 2017-06-16 RX ORDER — POTASSIUM CHLORIDE 750 MG/1
40 TABLET, EXTENDED RELEASE ORAL ONCE
Status: COMPLETED | OUTPATIENT
Start: 2017-06-16 | End: 2017-06-16

## 2017-06-16 RX ADMIN — MONTELUKAST SODIUM 10 MG: 10 TABLET, FILM COATED ORAL at 22:08

## 2017-06-16 RX ADMIN — UMECLIDINIUM 1 PUFF: 62.5 AEROSOL, POWDER ORAL at 09:19

## 2017-06-16 RX ADMIN — IPRATROPIUM BROMIDE AND ALBUTEROL SULFATE 3 ML: .5; 3 SOLUTION RESPIRATORY (INHALATION) at 20:40

## 2017-06-16 RX ADMIN — SENNOSIDES 1 TABLET: 8.6 TABLET, FILM COATED ORAL at 09:11

## 2017-06-16 RX ADMIN — FLUTICASONE PROPIONATE 1 SPRAY: 50 SPRAY, METERED NASAL at 15:53

## 2017-06-16 RX ADMIN — POTASSIUM CHLORIDE 40 MEQ: 750 TABLET, EXTENDED RELEASE ORAL at 12:36

## 2017-06-16 RX ADMIN — ALLOPURINOL 50 MG: 100 TABLET ORAL at 11:44

## 2017-06-16 RX ADMIN — IPRATROPIUM BROMIDE AND ALBUTEROL SULFATE 3 ML: .5; 3 SOLUTION RESPIRATORY (INHALATION) at 12:46

## 2017-06-16 RX ADMIN — HYDRALAZINE HYDROCHLORIDE 10 MG: 10 TABLET ORAL at 19:46

## 2017-06-16 RX ADMIN — FUROSEMIDE 40 MG: 10 INJECTION, SOLUTION INTRAVENOUS at 09:11

## 2017-06-16 RX ADMIN — CITALOPRAM HYDROBROMIDE 20 MG: 20 TABLET ORAL at 09:10

## 2017-06-16 RX ADMIN — IPRATROPIUM BROMIDE AND ALBUTEROL SULFATE 3 ML: .5; 3 SOLUTION RESPIRATORY (INHALATION) at 08:36

## 2017-06-16 RX ADMIN — INSULIN ASPART 1 UNITS: 100 INJECTION, SOLUTION INTRAVENOUS; SUBCUTANEOUS at 12:50

## 2017-06-16 RX ADMIN — ACETAMINOPHEN 650 MG: 325 TABLET, FILM COATED ORAL at 08:53

## 2017-06-16 RX ADMIN — IPRATROPIUM BROMIDE AND ALBUTEROL SULFATE 3 ML: .5; 3 SOLUTION RESPIRATORY (INHALATION) at 16:50

## 2017-06-16 RX ADMIN — FUROSEMIDE 40 MG: 10 INJECTION, SOLUTION INTRAVENOUS at 14:40

## 2017-06-16 RX ADMIN — FUROSEMIDE 40 MG: 10 INJECTION, SOLUTION INTRAVENOUS at 00:39

## 2017-06-16 RX ADMIN — COLCHICINE 0.6 MG: 0.6 TABLET, FILM COATED ORAL at 09:10

## 2017-06-16 RX ADMIN — Medication 2 SPRAY: at 19:47

## 2017-06-16 RX ADMIN — HYDRALAZINE HYDROCHLORIDE 10 MG: 10 TABLET ORAL at 09:10

## 2017-06-16 RX ADMIN — IPRATROPIUM BROMIDE AND ALBUTEROL SULFATE 3 ML: .5; 3 SOLUTION RESPIRATORY (INHALATION) at 00:02

## 2017-06-16 RX ADMIN — FUROSEMIDE 40 MG: 10 INJECTION, SOLUTION INTRAVENOUS at 19:46

## 2017-06-16 RX ADMIN — FLUTICASONE FUROATE AND VILANTEROL TRIFENATATE 1 PUFF: 200; 25 POWDER RESPIRATORY (INHALATION) at 09:19

## 2017-06-16 RX ADMIN — DOCUSATE SODIUM 100 MG: 100 CAPSULE, LIQUID FILLED ORAL at 09:10

## 2017-06-16 RX ADMIN — HYDRALAZINE HYDROCHLORIDE 10 MG: 10 TABLET ORAL at 14:39

## 2017-06-16 RX ADMIN — Medication 220 MG: at 19:47

## 2017-06-16 RX ADMIN — ACETAMINOPHEN, ASPIRIN AND CAFFEINE 1 TABLET: 250; 250; 65 TABLET, FILM COATED ORAL at 15:54

## 2017-06-16 RX ADMIN — Medication 220 MG: at 09:10

## 2017-06-16 NOTE — PROGRESS NOTES
Social Work Services Progress Note    Hospital Day: 1  Collaborated with:  Jim    Data:  Jim was readmitted d/t heart failure. He is scheduled for lvad implant on Monday, 6/19.     Intervention:  CLFIF attempted to meet with Jim for supportive visit.     Assessment:  Jim was being wheeled to a procedure at the time of attempted visit. He recognized CLIFF and said hello.    Plan:    Anticipated Disposition:  Unclear until after lvad implant    Barriers to d/c plan:  Will have new lvad implant.    Follow Up:  CLIFF will be available as needed for support and d/c planning.  Pager [phone]

## 2017-06-16 NOTE — PLAN OF CARE
Problem: Goal Outcome Summary  Goal: Goal Outcome Summary  Outcome: No Change  Admitted for fluid up; scheduled for LVAD placement Monday 6/19. Pt VSS; v-paced with HR in the 90s; CPAP with sats in the mid 90s. No complaints of pain or discomfort. Advocates for occasional SOB and RESENDIZ when ambulating to bathroom. 40mg Lasix given x2; good UO. Tenderness in L elbow from gout. Poor appetite; pt states he did not get much sleep last night and does not feel like eating this evening. SSI administered for BS per parameters. Continue to monitor and notify team with changes.

## 2017-06-16 NOTE — PROGRESS NOTES
Cardiology Progress Note June 16, 2017  ===================================================  Assessment & Plan ; Hospital Day #1    Jim Willingham is a 62M h/o NICM (EF 20%) s/p Bi-V ICD, severe MR, VT, paroxsymal afib, DM, HTN, CKD, CECILIA, MDD, admitted for gradual shortness of breath, likely 2/2 to inadequate maintenance diuretics. Improving with BiPAP. Will keep inpt for upcoming LVAD.     Today's Change and Brief Plan  -cont LASIX 40mg TID  -KCL supplement 40mEq for today  -await LVAD scheduling      Acute hypoxic respiratory failure  - currently stable on BiPAP  - pt adherent to CPAP at night  - current decompensation likely 2/2 inadequate maintenance diuresis following halving home diuretics vs recent steroid use for acute gout increasing Na retention  - holding PO prednisone, L elbow already injected with methylpred  - more likely volume overload  - admission weight 253, last DC weight 2d ago was 261, however weights obtained on different floors, thus unreliable for assessing vol status  - weight 112.4kg(247lb) today from 114.8kg on admission  - with SCr now baseline, will increase diuresis to 1.5x PTA diuretic dose via IV lasix                          - PTA was bumex 1 PO BID, now lasix 40 IV TID  - if hypoxic does not resolve, can consider repeat RHC vs TTE     NICM (EF20%)  Combined Systolic Diastolic Heart Failure  - etiology likely 2/2 to chronic alcoholism vs amyloidosis vs ICM  - clean cor angio in past, unlikely ICM  - not had w/u for amyloidosis, but without significant family hx of cardiomyopathy  - significant EtOH strongly suggests etiology of heart failure  - LVAD implantation scheduled for next Monday, 6/19  - holding metoprolol, digoxin, spironolactone and losartan for now, due to LVAD placement soon  - holding warfarin due to upcoming surgery  - will refrain from bridging with heparin gtt as well ([id number] = 3)       Acute gout  - recent dx per rheum  - cont allopurinol and colchicine  - will  "f/u outpt with uric acid to titrate allopurinol to keep uric acid level below 6     CKD  - recent WALTER  - baseline SCr between 1.4-1.5  - SCr baseline on admission  - likely 2/2 DM2 and HF  - trend SCr      Chronic Conditions  H/o of ventricular tachycardia: [employer] ICD interrogated, no events since 3 weeks ago, continue amiodarone 100 mg qday  Paroxsymal Afib: [id number] = 3, continue amiodarone 100 mg qday  Asthma, COPD: continue PTA umeclidinium, singulair, scheduling duonebs q4 while awake for now, decrease as tolerated  - needs repeat PFTs at some point  DM: holding oral antiglycemics, SSI ordered   CECILIA: continue CPAP on home settings  Depression: continue home citalopram 20 mg qday      FEN: 2 gram sodium diet  PROPHY: mechanical, already on warfarin  LINES:  peripheral IV  DISPO:  inpt admission for ambulatory cardiogenic shock and LVAD placement  CODE STATUS:  full code     Pt discussed with attending physician, Dr. Duran Mccarty MD.     ???????????????????  ??????????????????????????. ????????????      =====================================================  Physical Examination    General: In NAD  Eyes:  Anicteric  Resp: CTAB, no crackles or wheezes  Cardiac: RRR, no murmurs/rubs/gallops  No S3, +mild S4  Abdomen: Soft, nontender, nondistended, +BS  Extremities: No LE edema  NEUROLOGIC: alert and oriented, speech fluent; PERRL, EOMI, facial muscles symmetric; no gait abnormalities appreciated.  Psychological: appropriate mood   =====================================================  Interval history:  No acute events overnight  Feeling better. No SOB.  No chest pain. No fever.       Review of Symptoms  3-point ROS reviewed & found negative.  Skin:no  GI:no  Respiratory:no    ==================================================  Objective:  BP 98/61 (BP Location: Right arm)  Pulse 87  Temp 99.5  F (37.5  C) (Oral)  Resp 16  Ht 1.727 m (5' 8\")  Wt 112.4 kg (247 lb 11.2 oz)  SpO2 95%  BMI 37.66 " kg/m2  Temp (24hrs), Av.6  F (37  C), Min:97.7  F (36.5  C), Max:99.5  F (37.5  C)    Patient Vitals for the past 24 hrs:   BP Temp Temp src Pulse Heart Rate Resp SpO2 Weight   17 1125 - - - - - - - 112.4 kg (247 lb 11.2 oz)   17 0847 98/61 99.5  F (37.5  C) Oral - 87 16 95 % -   17 0836 - - - - - - 98 % -   17 0420 111/80 97.7  F (36.5  C) - - - 20 94 % -   17 0035 107/81 98.3  F (36.8  C) - 87 - 20 93 % -   06/15/17 1933 99/69 99.1  F (37.3  C) Oral - 96 20 92 % -   06/15/17 1539 109/75 99.2  F (37.3  C) Axillary 101 107 24 97 % -   06/15/17 1348 112/77 97.9  F (36.6  C) Axillary - 94 21 98 % -   06/15/17 1315 107/81 - - - 98 15 100 % -     BP - Mean:  [68-89] 68    Vitals:    06/15/17 1141 17 1125   Weight: 114.8 kg (253 lb) 112.4 kg (247 lb 11.2 oz)     I/O last 3 completed shifts:  In: 240 [P.O.:240]  Out: 1675 [Urine:1675]    Intake/Output Summary (Last 24 hours) at 17 1217  Last data filed at 17 0755   Gross per 24 hour   Intake              240 ml   Output             2375 ml   Net            -2135 ml           Ventilator Settings  FiO2 (%): 30 %  Resp: 16    GEN:  Alert, oriented x 3, appears comfortable, NAD.  CV:  Regular rate and rhythm, no murmur or JVD.  S1 + S2 noted, no S3 or S4.  LUNGS:  Clear to auscultation bilaterally   ABD:  Active bowel sounds, soft, non-tender/non-distended.  No rebound/guarding/rigidity.  EXT:  No edema or cyanosis.      Medications   Current Facility-Administered Medications   Medication Dose Route Frequency     umeclidinium  1 puff Inhalation Daily     potassium chloride  40 mEq Oral Once     sodium chloride (PF)  3 mL Intracatheter Q8H     senna-docusate  1-2 tablet Oral BID     citalopram  20 mg Oral Daily     [START ON 2017] cyanocobalamin  1,000 mcg Intramuscular Q30 Days     fluticasone-vilanterol  1 puff Inhalation Daily     montelukast  10 mg Oral At Bedtime     zinc sulfate  220 mg Oral BID     docusate sodium   100 mg Oral BID     sennosides  1 tablet Oral Daily     allopurinol  50 mg Oral Daily     colchicine  0.6 mg Oral Daily     ipratropium - albuterol 0.5 mg/2.5 mg/3 mL  3 mL Nebulization Q4H While awake     hydrALAZINE  10 mg Oral TID     pneumococcal vaccine  0.5 mL Intramuscular Prior to discharge     insulin aspart  1-7 Units Subcutaneous TID AC     insulin aspart  1-5 Units Subcutaneous At Bedtime     furosemide  40 mg Intravenous TID     Infusions:     PRN Medications:  naloxone, lidocaine, lidocaine 4%, sodium chloride (PF), acetaminophen, senna-docusate, polyethylene glycol, bisacodyl, ondansetron **OR** ondansetron, albuterol, diclofenac, oxyCODONE, glucose **OR** dextrose **OR** glucagon  Current Facility-Administered Medications   Medication     umeclidinium (INCRUSE ELLIPTA) 62.5 MCG/INH oral inhaler 1 puff     potassium chloride SA (K-DUR/KLOR-CON M) CR tablet 40 mEq     naloxone (NARCAN) injection 0.1-0.4 mg     lidocaine 1 % 1 mL     lidocaine (LMX4) kit     sodium chloride (PF) 0.9% PF flush 3 mL     sodium chloride (PF) 0.9% PF flush 3 mL     acetaminophen (TYLENOL) tablet 650 mg     senna-docusate (SENOKOT-S;PERICOLACE) 8.6-50 MG per tablet 1-2 tablet     senna-docusate (SENOKOT-S;PERICOLACE) 8.6-50 MG per tablet 1-2 tablet     polyethylene glycol (MIRALAX/GLYCOLAX) Packet 17 g     bisacodyl (DULCOLAX) Suppository 10 mg     ondansetron (ZOFRAN-ODT) ODT tab 4 mg    Or     ondansetron (ZOFRAN) injection 4 mg     albuterol (PROAIR HFA/PROVENTIL HFA/VENTOLIN HFA) Inhaler 2 puff     citalopram (celeXA) tablet 20 mg     [START ON 7/1/2017] cyanocobalamin (VITAMIN B12) injection 1,000 mcg     fluticasone-vilanterol (BREO ELLIPTA) 200-25 MCG/INH oral inhaler 1 puff     montelukast (SINGULAIR) tablet 10 mg     zinc sulfate (ZINCATE) capsule 220 mg     diclofenac (VOLTAREN) 1 % topical gel 2 g     docusate sodium (COLACE) capsule 100 mg     sennosides (SENOKOT) tablet 1 tablet     allopurinol (ZYLOPRIM)  half-tab 50 mg     oxyCODONE (ROXICODONE) solution 2.5 mg     colchicine tablet 0.6 mg     ipratropium - albuterol 0.5 mg/2.5 mg/3 mL (DUONEB) neb solution 3 mL     hydrALAZINE (APRESOLINE) tablet 10 mg     pneumococcal vaccine (PNEUMOVAX 23-carina) injection 0.5 mL     glucose 40 % gel 15-30 g    Or     dextrose 50 % injection 25-50 mL    Or     glucagon injection 1 mg     insulin aspart (NovoLOG) inj (RAPID ACTING)     insulin aspart (NovoLOG) inj (RAPID ACTING)     furosemide (LASIX) injection 40 mg       Labs:  Data   CMP  Recent Labs  Lab 06/16/17  0621 06/15/17  1155 06/13/17  0450 06/12/17  0446 06/11/17  1315    139 136 136 137   POTASSIUM 3.8 4.0 4.2 4.6 5.0   CHLORIDE 104 103 106 103 104   CO2 29 29 24 25 28   ANIONGAP 5 7 6 7 4   * 109* 97 93 103*   BUN 29 33* 35* 48* 48*   CR 1.48* 1.41* 1.33* 1.89* 2.14*   GFRESTIMATED 48* 51* 55* 37* 32*   GFRESTBLACK 59* 62 66 44* 38*   QAMAR 9.1 9.0 8.6 8.6 8.3*   MAG  --   --  2.1 2.2 2.0   PHOS  --   --   --   --  3.3   PROTTOTAL  --  7.3  --   --  6.8   ALBUMIN  --  3.8  --   --  3.5   BILITOTAL  --  0.8  --   --  0.6   ALKPHOS  --  72  --   --  69   AST  --  23  --   --  27   ALT  --  26  --   --  27       CBC  Recent Labs  Lab 06/16/17  0621 06/15/17  1155 06/13/17  0450 06/12/17  0446   WBC 7.5 10.8 8.3 9.1   RBC 3.63* 3.61* 3.39* 3.70*   HGB 10.6* 10.7* 10.0* 11.1*   HCT 33.6* 33.6* 31.9* 34.6*   MCV 93 93 94 94   MCH 29.2 29.6 29.5 30.0   MCHC 31.5 31.8 31.3* 32.1   RDW 13.7 13.8 13.6 13.8    215 198 201       TroponinNo results found for: TROPI, TROPONIN, TROPR, TROPN    Lipid  Recent Labs   Lab Test  06/08/17   0944   CHOL  173   HDL  49   LDL  99   TRIG  124       Microbiology:  Data   Most Recent 6 Bacteria Isolates From Any Culture (See EPIC Reports for Culture Details):  Recent Labs   Lab Test  05/24/17   0834  05/23/17   1930   CULT  No growth  Moderate growth Normal jaxon     Culture Micro   Date Value Ref Range Status   05/24/2017 No  growth  Final   05/23/2017 Moderate growth Normal jaxon  Final          Imaging:  Data   Recent Results (from the past 48 hour(s))   Chest XR,  PA & LAT    Narrative    EXAM: XR CHEST 2 VW  6/15/2017 12:45 PM     HISTORY: Dyspnea       COMPARISON: Chest radiograph 5/23/2017    FINDINGS: Upright PA and lateral views of chest. Cardio defibrillator  device is unchanged. Slightly worsening of perihilar and bibasilar  opacities. Increasing fluid vasculature Slight thickening of the right  minor fissure. No pneumothorax. No significant pleural effusion.       Impression    IMPRESSION: Slight worsening of perihilar and bibasilar opacities,  likely pulmonary edema.    I have personally reviewed the examination and initial interpretation  and I agree with the findings.    ILYA HOBBS MD   XR Chest Port 1 View    Narrative    Exam: XR CHEST PORT 1 VW, 6/16/2017 8:11 AM    Indication: volume overloaded with HF, assess interval changes    Comparison: Previous day    Findings: Single frontal view of the chest is obtained. Stable  placement of 3-lead implantable cardiac defibrillator. Stable  enlargement of the cardiac silhouette. Mediastinum is normal.  Improving bibasilar pulmonary opacities. No appreciable pleural  effusion or pneumothorax. Decreased pulmonary vascularity. Decreased  mild retrocardiac opacities.      Impression    Impression:   1. Decreased pulmonary edema.  2. Stable cardiomegaly.    I have personally reviewed the examination and initial interpretation  and I agree with the findings.    ILYA HOBBS MD     Recent Results (from the past 24 hour(s))   Chest XR,  PA & LAT    Narrative    EXAM: XR CHEST 2 VW  6/15/2017 12:45 PM     HISTORY: Dyspnea       COMPARISON: Chest radiograph 5/23/2017    FINDINGS: Upright PA and lateral views of chest. Cardio defibrillator  device is unchanged. Slightly worsening of perihilar and bibasilar  opacities. Increasing fluid vasculature Slight thickening of the right  minor  fissure. No pneumothorax. No significant pleural effusion.       Impression    IMPRESSION: Slight worsening of perihilar and bibasilar opacities,  likely pulmonary edema.    I have personally reviewed the examination and initial interpretation  and I agree with the findings.    ILYA HOBBS MD   XR Chest Port 1 View    Narrative    Exam: XR CHEST PORT 1 VW, 6/16/2017 8:11 AM    Indication: volume overloaded with HF, assess interval changes    Comparison: Previous day    Findings: Single frontal view of the chest is obtained. Stable  placement of 3-lead implantable cardiac defibrillator. Stable  enlargement of the cardiac silhouette. Mediastinum is normal.  Improving bibasilar pulmonary opacities. No appreciable pleural  effusion or pneumothorax. Decreased pulmonary vascularity. Decreased  mild retrocardiac opacities.      Impression    Impression:   1. Decreased pulmonary edema.  2. Stable cardiomegaly.    I have personally reviewed the examination and initial interpretation  and I agree with the findings.    ILYA HOBBS MD       ----------------------------------------------------------------------      I personally provided care for this patient, reviewed chart, discussed course with patient, housestaff and consulting physicians.  I answered all questions.    Duran Mccarty M.D.  Division of Cardiology  Department of Medicine

## 2017-06-16 NOTE — PLAN OF CARE
Problem: Discharge Planning  Goal: Discharge Planning (Adult, OB, Behavioral, Peds)  Outcome: No Change  Alert and oriented x 4. Denies pain. Blood glucose was 121. Lasix given x 1. Voided in the urinal. Tired and slept most of the shift. Sleeping with CPAP on.

## 2017-06-17 LAB
ANION GAP SERPL CALCULATED.3IONS-SCNC: 6 MMOL/L (ref 3–14)
BASOPHILS # BLD AUTO: 0.1 10E9/L (ref 0–0.2)
BASOPHILS NFR BLD AUTO: 0.8 %
BUN SERPL-MCNC: 31 MG/DL (ref 7–30)
CALCIUM SERPL-MCNC: 8.5 MG/DL (ref 8.5–10.1)
CHLORIDE SERPL-SCNC: 102 MMOL/L (ref 94–109)
CO2 SERPL-SCNC: 27 MMOL/L (ref 20–32)
CREAT SERPL-MCNC: 1.47 MG/DL (ref 0.66–1.25)
DIFFERENTIAL METHOD BLD: ABNORMAL
EOSINOPHIL # BLD AUTO: 0.2 10E9/L (ref 0–0.7)
EOSINOPHIL NFR BLD AUTO: 2.8 %
ERYTHROCYTE [DISTWIDTH] IN BLOOD BY AUTOMATED COUNT: 13.5 % (ref 10–15)
GFR SERPL CREATININE-BSD FRML MDRD: 49 ML/MIN/1.7M2
GLUCOSE BLDC GLUCOMTR-MCNC: 106 MG/DL (ref 70–99)
GLUCOSE BLDC GLUCOMTR-MCNC: 119 MG/DL (ref 70–99)
GLUCOSE BLDC GLUCOMTR-MCNC: 127 MG/DL (ref 70–99)
GLUCOSE BLDC GLUCOMTR-MCNC: 152 MG/DL (ref 70–99)
GLUCOSE BLDC GLUCOMTR-MCNC: 161 MG/DL (ref 70–99)
GLUCOSE SERPL-MCNC: 113 MG/DL (ref 70–99)
HCT VFR BLD AUTO: 31.9 % (ref 40–53)
HGB BLD-MCNC: 10.4 G/DL (ref 13.3–17.7)
IMM GRANULOCYTES # BLD: 0 10E9/L (ref 0–0.4)
IMM GRANULOCYTES NFR BLD: 0.3 %
INR PPP: 1.26 (ref 0.86–1.14)
LYMPHOCYTES # BLD AUTO: 1.8 10E9/L (ref 0.8–5.3)
LYMPHOCYTES NFR BLD AUTO: 29.5 %
MCH RBC QN AUTO: 29.6 PG (ref 26.5–33)
MCHC RBC AUTO-ENTMCNC: 32.6 G/DL (ref 31.5–36.5)
MCV RBC AUTO: 91 FL (ref 78–100)
MONOCYTES # BLD AUTO: 0.8 10E9/L (ref 0–1.3)
MONOCYTES NFR BLD AUTO: 12.7 %
NEUTROPHILS # BLD AUTO: 3.2 10E9/L (ref 1.6–8.3)
NEUTROPHILS NFR BLD AUTO: 53.9 %
NRBC # BLD AUTO: 0 10*3/UL
NRBC BLD AUTO-RTO: 0 /100
PLATELET # BLD AUTO: 216 10E9/L (ref 150–450)
POTASSIUM SERPL-SCNC: 3.3 MMOL/L (ref 3.4–5.3)
POTASSIUM SERPL-SCNC: 3.8 MMOL/L (ref 3.4–5.3)
RBC # BLD AUTO: 3.51 10E12/L (ref 4.4–5.9)
SODIUM SERPL-SCNC: 136 MMOL/L (ref 133–144)
WBC # BLD AUTO: 6 10E9/L (ref 4–11)

## 2017-06-17 PROCEDURE — 21400006 ZZH R&B CCU INTERMEDIATE UMMC

## 2017-06-17 PROCEDURE — 36415 COLL VENOUS BLD VENIPUNCTURE: CPT | Performed by: INTERNAL MEDICINE

## 2017-06-17 PROCEDURE — 80048 BASIC METABOLIC PNL TOTAL CA: CPT | Performed by: STUDENT IN AN ORGANIZED HEALTH CARE EDUCATION/TRAINING PROGRAM

## 2017-06-17 PROCEDURE — 25000125 ZZHC RX 250: Performed by: INTERNAL MEDICINE

## 2017-06-17 PROCEDURE — 85610 PROTHROMBIN TIME: CPT | Performed by: STUDENT IN AN ORGANIZED HEALTH CARE EDUCATION/TRAINING PROGRAM

## 2017-06-17 PROCEDURE — 94640 AIRWAY INHALATION TREATMENT: CPT

## 2017-06-17 PROCEDURE — 25000128 H RX IP 250 OP 636: Performed by: STUDENT IN AN ORGANIZED HEALTH CARE EDUCATION/TRAINING PROGRAM

## 2017-06-17 PROCEDURE — 84132 ASSAY OF SERUM POTASSIUM: CPT | Performed by: INTERNAL MEDICINE

## 2017-06-17 PROCEDURE — 36415 COLL VENOUS BLD VENIPUNCTURE: CPT | Performed by: STUDENT IN AN ORGANIZED HEALTH CARE EDUCATION/TRAINING PROGRAM

## 2017-06-17 PROCEDURE — 40000275 ZZH STATISTIC RCP TIME EA 10 MIN

## 2017-06-17 PROCEDURE — 25000132 ZZH RX MED GY IP 250 OP 250 PS 637: Performed by: STUDENT IN AN ORGANIZED HEALTH CARE EDUCATION/TRAINING PROGRAM

## 2017-06-17 PROCEDURE — 00000146 ZZHCL STATISTIC GLUCOSE BY METER IP

## 2017-06-17 PROCEDURE — 94640 AIRWAY INHALATION TREATMENT: CPT | Mod: 76

## 2017-06-17 PROCEDURE — 85025 COMPLETE CBC W/AUTO DIFF WBC: CPT | Performed by: STUDENT IN AN ORGANIZED HEALTH CARE EDUCATION/TRAINING PROGRAM

## 2017-06-17 RX ORDER — POTASSIUM CHLORIDE 1.5 G/1.58G
20-40 POWDER, FOR SOLUTION ORAL
Status: DISCONTINUED | OUTPATIENT
Start: 2017-06-17 | End: 2017-06-20

## 2017-06-17 RX ORDER — MAGNESIUM SULFATE HEPTAHYDRATE 40 MG/ML
4 INJECTION, SOLUTION INTRAVENOUS EVERY 4 HOURS PRN
Status: DISCONTINUED | OUTPATIENT
Start: 2017-06-17 | End: 2017-06-20

## 2017-06-17 RX ORDER — POTASSIUM CHLORIDE 750 MG/1
20-40 TABLET, EXTENDED RELEASE ORAL
Status: DISCONTINUED | OUTPATIENT
Start: 2017-06-17 | End: 2017-06-20

## 2017-06-17 RX ORDER — TRAZODONE HYDROCHLORIDE 50 MG/1
50 TABLET, FILM COATED ORAL
Status: DISCONTINUED | OUTPATIENT
Start: 2017-06-17 | End: 2017-06-20

## 2017-06-17 RX ORDER — POTASSIUM CL/LIDO/0.9 % NACL 10MEQ/0.1L
10 INTRAVENOUS SOLUTION, PIGGYBACK (ML) INTRAVENOUS
Status: DISCONTINUED | OUTPATIENT
Start: 2017-06-17 | End: 2017-06-20

## 2017-06-17 RX ORDER — POTASSIUM CHLORIDE 7.45 MG/ML
10 INJECTION INTRAVENOUS
Status: DISCONTINUED | OUTPATIENT
Start: 2017-06-17 | End: 2017-06-20

## 2017-06-17 RX ORDER — POTASSIUM CHLORIDE 29.8 MG/ML
20 INJECTION INTRAVENOUS
Status: DISCONTINUED | OUTPATIENT
Start: 2017-06-17 | End: 2017-06-20

## 2017-06-17 RX ADMIN — CITALOPRAM HYDROBROMIDE 20 MG: 20 TABLET ORAL at 09:07

## 2017-06-17 RX ADMIN — SENNOSIDES AND DOCUSATE SODIUM 2 TABLET: 8.6; 5 TABLET ORAL at 09:07

## 2017-06-17 RX ADMIN — POTASSIUM CHLORIDE 20 MEQ: 750 TABLET, EXTENDED RELEASE ORAL at 18:48

## 2017-06-17 RX ADMIN — FUROSEMIDE 40 MG: 10 INJECTION, SOLUTION INTRAVENOUS at 09:06

## 2017-06-17 RX ADMIN — UMECLIDINIUM 1 PUFF: 62.5 AEROSOL, POWDER ORAL at 09:04

## 2017-06-17 RX ADMIN — TRAZODONE HYDROCHLORIDE 50 MG: 50 TABLET ORAL at 21:58

## 2017-06-17 RX ADMIN — Medication 220 MG: at 19:48

## 2017-06-17 RX ADMIN — MONTELUKAST SODIUM 10 MG: 10 TABLET, FILM COATED ORAL at 21:58

## 2017-06-17 RX ADMIN — COLCHICINE 0.6 MG: 0.6 TABLET, FILM COATED ORAL at 09:06

## 2017-06-17 RX ADMIN — Medication: at 21:59

## 2017-06-17 RX ADMIN — HYDRALAZINE HYDROCHLORIDE 10 MG: 10 TABLET ORAL at 19:48

## 2017-06-17 RX ADMIN — IPRATROPIUM BROMIDE AND ALBUTEROL SULFATE 3 ML: .5; 3 SOLUTION RESPIRATORY (INHALATION) at 12:44

## 2017-06-17 RX ADMIN — POTASSIUM CHLORIDE 40 MEQ: 750 TABLET, EXTENDED RELEASE ORAL at 09:04

## 2017-06-17 RX ADMIN — DOCUSATE SODIUM 100 MG: 100 CAPSULE, LIQUID FILLED ORAL at 19:47

## 2017-06-17 RX ADMIN — FUROSEMIDE 40 MG: 10 INJECTION, SOLUTION INTRAVENOUS at 14:28

## 2017-06-17 RX ADMIN — ALLOPURINOL 50 MG: 100 TABLET ORAL at 09:07

## 2017-06-17 RX ADMIN — IPRATROPIUM BROMIDE AND ALBUTEROL SULFATE 3 ML: .5; 3 SOLUTION RESPIRATORY (INHALATION) at 21:44

## 2017-06-17 RX ADMIN — INSULIN ASPART 1 UNITS: 100 INJECTION, SOLUTION INTRAVENOUS; SUBCUTANEOUS at 13:12

## 2017-06-17 RX ADMIN — HYDRALAZINE HYDROCHLORIDE 10 MG: 10 TABLET ORAL at 09:07

## 2017-06-17 RX ADMIN — Medication 2 SPRAY: at 09:06

## 2017-06-17 RX ADMIN — HYDRALAZINE HYDROCHLORIDE 10 MG: 10 TABLET ORAL at 14:28

## 2017-06-17 RX ADMIN — POTASSIUM CHLORIDE 20 MEQ: 750 TABLET, EXTENDED RELEASE ORAL at 11:28

## 2017-06-17 RX ADMIN — Medication 1 G: at 18:48

## 2017-06-17 RX ADMIN — FLUTICASONE FUROATE AND VILANTEROL TRIFENATATE 1 PUFF: 200; 25 POWDER RESPIRATORY (INHALATION) at 09:04

## 2017-06-17 RX ADMIN — Medication 220 MG: at 09:06

## 2017-06-17 RX ADMIN — IPRATROPIUM BROMIDE AND ALBUTEROL SULFATE 3 ML: .5; 3 SOLUTION RESPIRATORY (INHALATION) at 16:06

## 2017-06-17 RX ADMIN — IPRATROPIUM BROMIDE AND ALBUTEROL SULFATE 3 ML: .5; 3 SOLUTION RESPIRATORY (INHALATION) at 09:26

## 2017-06-17 RX ADMIN — FUROSEMIDE 40 MG: 10 INJECTION, SOLUTION INTRAVENOUS at 19:48

## 2017-06-17 RX ADMIN — Medication 2 SPRAY: at 19:48

## 2017-06-17 RX ADMIN — FLUTICASONE PROPIONATE 1 SPRAY: 50 SPRAY, METERED NASAL at 09:05

## 2017-06-17 ASSESSMENT — PAIN DESCRIPTION - DESCRIPTORS: DESCRIPTORS: ACHING

## 2017-06-17 NOTE — PLAN OF CARE
Problem: Goal Outcome Summary  Goal: Goal Outcome Summary  Outcome: No Change  D: Admit 6/15 with worsening SOB.   I/A: Alert, oriented. Pain reported in L elbow (gout; aggravated by patient hitting his elbow); MD aware and ice offered, per recommendation. Patient declined intervention, and MD discussed pain with patient; new orders for Ketamine cream. Potassium replaced per protocol; upon recheck, K 3.8; will replace when patient returns to unit following family visit. Denies SOB; some dyspnea on exertion. Up in hallway ambulating. IV Lasix given; Voiding good amounts. BS 110s-160s. V paced. Duonebs q4h while awake. PIV SL. New order for Trazadone PRN for sleep.  P: Plan for swan placement on 6/18 and LVAD placement on 6/19. Update CARDS 2 if questions/concerns.   Tori Marcus RN

## 2017-06-17 NOTE — PROGRESS NOTES
Patient left floor 6c to meet with family, per MD order for patient to leave floor, at 1635.   Tori Marcus RN

## 2017-06-17 NOTE — CONSULTS
"Dental Service Consultation        Jim Willingham MRN# [id number]   YOB: 1955 Age: 62 year old   Date of Admission: 6/15/2017     Reason for consult: I was asked by Dr. German Gómez to evaluate this patient for pre LVAD routine dental consult.           Assessment and Plan:   Assessment:   Active decay on #28  Retained root tips on #18 and 19 ([place] Class VIII).    Plan:  Recommend extraction of #18 and 19 root tips due to lack of restorability and possibility of being a source of infection. Restoration with possible extraction of #28 per more detailed radiographic evidence. Please contact on-call dentist to discuss dental tx options in line with patient's LVAD surgery status.         Chief Complaint:   \"I'm getting an LVAD Monday. Nothing hurts in my mouth.\"    History is obtained from the patient         History of Present Illness:   This patient is a 62 year old male who presents with acutely decompensated congestive heart failure IIID, with poor dentition on the lower left quadrant of his mouth. Patient noted that lower left has been in that state \"for years\" and does not cause any pain unless food gets caught in that roselyn while chewing.              Past Medical History:     Past Medical History:   Diagnosis Date     Benign essential hypertension 5/11/2017     Cardiomyopathy, unspecified (H) 5/8/2017     CKD (chronic kidney disease) stage 3, GFR 30-59 ml/min 5/11/2017     Depression 5/11/2017     Diabetes mellitus (H) 5/11/2017     H/O gastric bypass 5/11/2017     ICD (implantable cardioverter-defibrillator), biventricular, in situ 5/11/2017     NICM (nonischemic cardiomyopathy) (H)/ EF 20% 5/11/2017    ECHO: LVEDd. 7.66 cm, Restrictive pattern , Severe mitral valve regurgitation     CECILIA (obstructive sleep apnea) 5/11/2017     Paroxysmal atrial fibrillation (H) 5/11/2017     Paroxysmal VT (H) 5/11/2017     Vitamin B12 deficiency (non anemic) 5/11/2017             Past Surgical History:     Past " Surgical History:   Procedure Laterality Date     GI SURGERY  2003    [facility]     ORTHOPEDIC SURGERY  1994    right knee wired               Social History:     Social History   Substance Use Topics     Smoking status: Former Smoker     Quit date: 1995     Smokeless tobacco: Not on file     Alcohol use No             Family History:   No family history on file.          Immunizations:   There is no immunization history for the selected administration types on file for this patient.          Allergies:     Allergies   Allergen Reactions     Ace Inhibitors Unknown     Penicillins      Sulfa Drugs Unknown             Medications:     Current Facility-Administered Medications Ordered in Epic   Medication Dose Route Frequency Last Rate Last Dose     umeclidinium (INCRUSE ELLIPTA) 62.5 MCG/INH oral inhaler 1 puff  1 puff Inhalation Daily   1 puff at 06/16/17 0919     fluticasone (FLONASE) 50 MCG/ACT spray 1 spray  1 spray Both Nostrils Daily   1 spray at 06/16/17 1553     sodium chloride (OCEAN) 0.65 % nasal spray 2 spray  2 spray Both Nostrils BID   2 spray at 06/16/17 1947     ipratropium - albuterol 0.5 mg/2.5 mg/3 mL (DUONEB) neb solution 3 mL  3 mL Nebulization 4x Daily   3 mL at 06/16/17 2040     naloxone (NARCAN) injection 0.1-0.4 mg  0.1-0.4 mg Intravenous Q2 Min PRN         lidocaine 1 % 1 mL  1 mL Other Q1H PRN         lidocaine (LMX4) kit   Topical Q1H PRN         sodium chloride (PF) 0.9% PF flush 3 mL  3 mL Intracatheter Q1H PRN   3 mL at 06/16/17 0039     sodium chloride (PF) 0.9% PF flush 3 mL  3 mL Intracatheter Q8H   3 mL at 06/16/17 1440     acetaminophen (TYLENOL) tablet 650 mg  650 mg Oral Q4H PRN   650 mg at 06/16/17 0853     senna-docusate (SENOKOT-S;PERICOLACE) 8.6-50 MG per tablet 1-2 tablet  1-2 tablet Oral BID         senna-docusate (SENOKOT-S;PERICOLACE) 8.6-50 MG per tablet 1-2 tablet  1-2 tablet Oral BID PRN         polyethylene glycol (MIRALAX/GLYCOLAX) Packet 17 g  17 g Oral Daily PRN          bisacodyl (DULCOLAX) Suppository 10 mg  10 mg Rectal Daily PRN         ondansetron (ZOFRAN-ODT) ODT tab 4 mg  4 mg Oral Q6H PRN        Or     ondansetron (ZOFRAN) injection 4 mg  4 mg Intravenous Q6H PRN         albuterol (PROAIR HFA/PROVENTIL HFA/VENTOLIN HFA) Inhaler 2 puff  2 puff Inhalation Q4H PRN         citalopram (celeXA) tablet 20 mg  20 mg Oral Daily   20 mg at 06/16/17 0910     [START ON 7/1/2017] cyanocobalamin (VITAMIN B12) injection 1,000 mcg  1,000 mcg Intramuscular Q30 Days         fluticasone-vilanterol (BREO ELLIPTA) 200-25 MCG/INH oral inhaler 1 puff  1 puff Inhalation Daily   1 puff at 06/16/17 0919     montelukast (SINGULAIR) tablet 10 mg  10 mg Oral At Bedtime   10 mg at 06/15/17 2115     zinc sulfate (ZINCATE) capsule 220 mg  220 mg Oral BID   220 mg at 06/16/17 1947     diclofenac (VOLTAREN) 1 % topical gel 2 g  2 g Topical 4x Daily PRN         docusate sodium (COLACE) capsule 100 mg  100 mg Oral BID   100 mg at 06/16/17 0910     sennosides (SENOKOT) tablet 1 tablet  1 tablet Oral Daily   1 tablet at 06/16/17 0911     allopurinol (ZYLOPRIM) half-tab 50 mg  50 mg Oral Daily   50 mg at 06/16/17 1144     oxyCODONE (ROXICODONE) solution 2.5 mg  2.5 mg Oral Q4H PRN         colchicine tablet 0.6 mg  0.6 mg Oral Daily   0.6 mg at 06/16/17 0910     hydrALAZINE (APRESOLINE) tablet 10 mg  10 mg Oral TID   10 mg at 06/16/17 1946     pneumococcal vaccine (PNEUMOVAX 23-carina) injection 0.5 mL  0.5 mL Intramuscular Prior to discharge         glucose 40 % gel 15-30 g  15-30 g Oral Q15 Min PRN        Or     dextrose 50 % injection 25-50 mL  25-50 mL Intravenous Q15 Min PRN        Or     glucagon injection 1 mg  1 mg Subcutaneous Q15 Min PRN         insulin aspart (NovoLOG) inj (RAPID ACTING)  1-7 Units Subcutaneous TID AC   1 Units at 06/16/17 1250     insulin aspart (NovoLOG) inj (RAPID ACTING)  1-5 Units Subcutaneous At Bedtime   1 Units at 06/15/17 9181     furosemide (LASIX) injection 40 mg  40 mg  Intravenous TID   40 mg at 06/16/17 1946     No current Epic-ordered outpatient prescriptions on file.             Review of Systems:   The 10 point Review of Systems is negative other than noted in the HPI            Physical Exam:   Vitals were reviewed  Temp: 98.8  F (37.1  C) Temp src: Oral BP: 106/75 Pulse: 87 Heart Rate: 95 Resp: 18 SpO2: 98 % O2 Device: None (Room air)      Head and neck exam:  Negative for tenderness upon palpation at submandibular area. TMJ negative for crepitus, popping, or locking upon function or palpation. Patient visually presents symmetrical bilaterally.     Intraoral exam:  Negative for abscess, swelling, or purulence. Negative for tenderness upon palpation around gingiva or FOM. Negative for ulcerations on buccal mucosa or tongue. Noted minimal areas of gingival inflammation.     Lower left area presents with fractured teeth with retained root tips.          Data:   Radiographic interpretation: Negative for any defined periapical radiolucency. Noted root canal treated #19. Noted coronal radiolucency on #28 suggesting active decay.  Orthopantomogram taken.  Osseous pathology: none  Pulpal Pathology: pulpal necrosis on #18 and 19. Possible pulpal necrosis on #28  Periodontal Pathology: none  Caries: active on #28  Odontogenic pathology: none    Leonidas Arambula DDS  PGY1, dental resident  Pager: [phone]

## 2017-06-17 NOTE — PROGRESS NOTES
Cardiology Progress Note June 16, 2017  ===================================================  Assessment & Plan ; Hospital Day #2    62M h/o NICM (EF 20%) s/p Bi-V ICD, severe MR, VT, paroxsymal afib, DM, HTN, CKD, CECILIA, MDD, admitted for gradual shortness of breath, likely 2/2 to inadequate maintenance diuretics. Improving with BiPAP. Will keep inpt for upcoming LVAD.     Today's Change and Brief Plan  - [place] and balloon pump insertion on 6/18, 10am      Acute hypoxic respiratory failure  - continue NIPPV at night  - current decompensation likely 2/2 inadequate maintenance diuresis following halving home diuretics vs recent steroid use for acute gout increasing Na retention  - holding PO prednisone, L elbow already injected with methylpred  - more likely volume overload  - admission weight 253, last DC weight 2d ago was 261, however weights obtained on different floors, thus unreliable for assessing vol status  - weight 112.4kg(247lb) today from 114.8kg on admission  - with SCr now baseline, will increase diuresis to 1.5x PTA diuretic dose via IV lasix                          - PTA was bumex 1 PO BID, now lasix 40 IV TID     NICM (EF20%)  Combined Systolic Diastolic Heart Failure  - etiology likely 2/2 to chronic alcoholism vs amyloidosis vs ICM  - clean cor angio in past, unlikely ICM  - not had w/u for amyloidosis, but without significant family hx of cardiomyopathy  - significant EtOH strongly suggests etiology of heart failure  - LVAD implantation scheduled for next Monday, 6/19  - holding metoprolol, digoxin, spironolactone and losartan for now, due to LVAD placement soon  - holding warfarin due to upcoming surgery  - will refrain from bridging with heparin gtt as well ([id number] = 3)       Acute gout  - recent dx per rheum  - cont allopurinol and colchicine  - will f/u outpt with uric acid to titrate allopurinol to keep uric acid level below 6     CKD  - recent WALTER  - baseline SCr between 1.4-1.5  - SCr  "baseline on admission  - likely 2/2 DM2 and HF  - trend SCr      Chronic Conditions  H/o of ventricular tachycardia: [employer] ICD interrogated, no events since 3 weeks ago, continue amiodarone 100 mg qday  Paroxsymal Afib: [id number] = 3, continue amiodarone 100 mg qday  Asthma, COPD: continue PTA umeclidinium, singulair, scheduling duonebs q4 while awake for now, decrease as tolerated  - needs repeat PFTs at some point  DM: holding oral antiglycemics, SSI ordered   CECILIA: continue CPAP on home settings  Depression: continue home citalopram 20 mg qday      FEN: 2 gram sodium diet  PROPHY: mechanical, already on warfarin  LINES:  peripheral IV  DISPO:  inpt admission for ambulatory cardiogenic shock and LVAD placement  CODE STATUS:  full code     =====================================================  Care team notes last 24 hours reviewed.  No acute events overnight  Feeling better. No SOB.  L elbow mildly tender.   No chest pain. No fever.     Review of Symptoms  3-point ROS reviewed & found negative.  Skin:no  GI:no  Respiratory:no  ==================================================  Objective:  /81 (BP Location: Left arm)  Pulse 87  Temp 98.6  F (37  C) (Oral)  Resp 18  Ht 1.727 m (5' 8\")  Wt 113.1 kg (249 lb 4.8 oz)  SpO2 96%  BMI 37.91 kg/m2  Temp (24hrs), Av.6  F (37  C), Min:97.7  F (36.5  C), Max:99.5  F (37.5  C)    Patient Vitals for the past 24 hrs:   BP Temp Temp src Heart Rate Resp SpO2 Weight   17 1130 101/81 98.6  F (37  C) Oral 93 18 96 % -   17 0859 107/75 98.8  F (37.1  C) Oral 93 18 97 % -   17 0400 - - - - - - 113.1 kg (249 lb 4.8 oz)   17 0000 114/83 98.8  F (37.1  C) Oral 78 20 - -   17 1932 106/75 98.8  F (37.1  C) Oral 95 18 - -   17 1638 101/75 98.9  F (37.2  C) Oral 90 16 - -   17 1439 105/71 - - - - - -     BP - Mean:  [80-89] 80    Vitals:    06/15/17 1141 17 1125 17 0400   Weight: 114.8 kg (253 lb) 112.4 kg (247 lb 11.2 " oz) 113.1 kg (249 lb 4.8 oz)     Intake/Output Summary (Last 24 hours) at 06/17/17 1439  Last data filed at 06/17/17 1400   Gross per 24 hour   Intake              780 ml   Output             1750 ml   Net             -970 ml     GEN:  Alert, oriented x 3, appears comfortable, NAD.  CV:  Regular rate and rhythm, no murmur or JVD.  S1 + S2 noted, no S3 or S4.  LUNGS:  Clear to auscultation bilaterally   ABD:  Active bowel sounds, soft, non-tender/non-distended.  No rebound/guarding/rigidity.  EXT:  No edema or cyanosis.      Medications   Current Facility-Administered Medications   Medication Dose Route Frequency     ketamine (KETALAR) 5%-gabapentin (NEURONTIN) 8%-lidocaine 2.5%   Topical TID     umeclidinium  1 puff Inhalation Daily     fluticasone  1 spray Both Nostrils Daily     sodium chloride  2 spray Both Nostrils BID     ipratropium - albuterol 0.5 mg/2.5 mg/3 mL  3 mL Nebulization 4x Daily     sodium chloride (PF)  3 mL Intracatheter Q8H     senna-docusate  1-2 tablet Oral BID     citalopram  20 mg Oral Daily     [START ON 7/1/2017] cyanocobalamin  1,000 mcg Intramuscular Q30 Days     fluticasone-vilanterol  1 puff Inhalation Daily     montelukast  10 mg Oral At Bedtime     zinc sulfate  220 mg Oral BID     docusate sodium  100 mg Oral BID     sennosides  1 tablet Oral Daily     allopurinol  50 mg Oral Daily     colchicine  0.6 mg Oral Daily     hydrALAZINE  10 mg Oral TID     pneumococcal vaccine  0.5 mL Intramuscular Prior to discharge     insulin aspart  1-7 Units Subcutaneous TID AC     insulin aspart  1-5 Units Subcutaneous At Bedtime     furosemide  40 mg Intravenous TID     Infusions:     PRN Medications:  magnesium sulfate, magnesium sulfate, potassium chloride, potassium chloride, potassium chloride, potassium chloride with lidocaine, potassium chloride, traZODone **OR** traZODone, naloxone, lidocaine, lidocaine 4%, sodium chloride (PF), acetaminophen, senna-docusate, polyethylene glycol, bisacodyl,  ondansetron **OR** ondansetron, albuterol, diclofenac, oxyCODONE, glucose **OR** dextrose **OR** glucagon  Current Facility-Administered Medications   Medication     magnesium sulfate 2 g in NS intermittent infusion ([facility] or [facility])     magnesium sulfate 4 g in 100 mL sterile water (premade)     potassium chloride SA (K-DUR/KLOR-CON M) CR tablet 20-40 mEq     potassium chloride (KLOR-CON) Packet 20-40 mEq     potassium chloride 10 mEq in 100 mL intermittent infusion     potassium chloride 10 mEq in 100 mL intermittent infusion with 10 mg lidocaine     potassium chloride 20 mEq in 50 mL intermittent infusion     traZODone (DESYREL) half-tab 25 mg    Or     traZODone (DESYREL) tablet 50 mg     ketamine (KETALAR) 5%-gabapentin (NEURONTIN) 8%-lidocaine 2.5% topical PLO cream     umeclidinium (INCRUSE ELLIPTA) 62.5 MCG/INH oral inhaler 1 puff     fluticasone (FLONASE) 50 MCG/ACT spray 1 spray     sodium chloride (OCEAN) 0.65 % nasal spray 2 spray     ipratropium - albuterol 0.5 mg/2.5 mg/3 mL (DUONEB) neb solution 3 mL     naloxone (NARCAN) injection 0.1-0.4 mg     lidocaine 1 % 1 mL     lidocaine (LMX4) kit     sodium chloride (PF) 0.9% PF flush 3 mL     sodium chloride (PF) 0.9% PF flush 3 mL     acetaminophen (TYLENOL) tablet 650 mg     senna-docusate (SENOKOT-S;PERICOLACE) 8.6-50 MG per tablet 1-2 tablet     senna-docusate (SENOKOT-S;PERICOLACE) 8.6-50 MG per tablet 1-2 tablet     polyethylene glycol (MIRALAX/GLYCOLAX) Packet 17 g     bisacodyl (DULCOLAX) Suppository 10 mg     ondansetron (ZOFRAN-ODT) ODT tab 4 mg    Or     ondansetron (ZOFRAN) injection 4 mg     albuterol (PROAIR HFA/PROVENTIL HFA/VENTOLIN HFA) Inhaler 2 puff     citalopram (celeXA) tablet 20 mg     [START ON 7/1/2017] cyanocobalamin (VITAMIN B12) injection 1,000 mcg     fluticasone-vilanterol (BREO ELLIPTA) 200-25 MCG/INH oral inhaler 1 puff     montelukast (SINGULAIR) tablet 10 mg     zinc sulfate (ZINCATE) capsule 220 mg     diclofenac  (VOLTAREN) 1 % topical gel 2 g     docusate sodium (COLACE) capsule 100 mg     sennosides (SENOKOT) tablet 1 tablet     allopurinol (ZYLOPRIM) half-tab 50 mg     oxyCODONE (ROXICODONE) solution 2.5 mg     colchicine tablet 0.6 mg     hydrALAZINE (APRESOLINE) tablet 10 mg     pneumococcal vaccine (PNEUMOVAX 23-carina) injection 0.5 mL     glucose 40 % gel 15-30 g    Or     dextrose 50 % injection 25-50 mL    Or     glucagon injection 1 mg     insulin aspart (NovoLOG) inj (RAPID ACTING)     insulin aspart (NovoLOG) inj (RAPID ACTING)     furosemide (LASIX) injection 40 mg       Labs:  Data   CMP    Recent Labs  Lab 06/17/17  0658 06/16/17  0621 06/15/17  1155 06/13/17  0450 06/12/17  0446 06/11/17  1315    138 139 136 136 137   POTASSIUM 3.3* 3.8 4.0 4.2 4.6 5.0   CHLORIDE 102 104 103 106 103 104   CO2 27 29 29 24 25 28   ANIONGAP 6 5 7 6 7 4   * 111* 109* 97 93 103*   BUN 31* 29 33* 35* 48* 48*   CR 1.47* 1.48* 1.41* 1.33* 1.89* 2.14*   GFRESTIMATED 49* 48* 51* 55* 37* 32*   GFRESTBLACK 59* 59* 62 66 44* 38*   QAMAR 8.5 9.1 9.0 8.6 8.6 8.3*   MAG  --   --   --  2.1 2.2 2.0   PHOS  --   --   --   --   --  3.3   PROTTOTAL  --   --  7.3  --   --  6.8   ALBUMIN  --   --  3.8  --   --  3.5   BILITOTAL  --   --  0.8  --   --  0.6   ALKPHOS  --   --  72  --   --  69   AST  --   --  23  --   --  27   ALT  --   --  26  --   --  27       CBC    Recent Labs  Lab 06/17/17  0658 06/16/17  0621 06/15/17  1155 06/13/17  0450   WBC 6.0 7.5 10.8 8.3   RBC 3.51* 3.63* 3.61* 3.39*   HGB 10.4* 10.6* 10.7* 10.0*   HCT 31.9* 33.6* 33.6* 31.9*   MCV 91 93 93 94   MCH 29.6 29.2 29.6 29.5   MCHC 32.6 31.5 31.8 31.3*   RDW 13.5 13.7 13.8 13.6    208 215 198       TroponinNo results found for: TROPI, TROPONIN, TROPR, TROPN    Lipid  Recent Labs   Lab Test  06/08/17   0944   CHOL  173   HDL  49   LDL  99   TRIG  124       Microbiology:  Data   Most Recent 6 Bacteria Isolates From Any Culture (See EPIC Reports for Culture  Details):  Recent Labs   Lab Test  05/24/17   0834  05/23/17   1930   CULT  No growth  Moderate growth Normal jaxon     Culture Micro   Date Value Ref Range Status   05/24/2017 No growth  Final   05/23/2017 Moderate growth Normal jaxon  Final          Imaging:  Data   Recent Results (from the past 48 hour(s))   Chest XR,  PA & LAT    Narrative    EXAM: XR CHEST 2 VW  6/15/2017 12:45 PM     HISTORY: Dyspnea       COMPARISON: Chest radiograph 5/23/2017    FINDINGS: Upright PA and lateral views of chest. Cardio defibrillator  device is unchanged. Slightly worsening of perihilar and bibasilar  opacities. Increasing fluid vasculature Slight thickening of the right  minor fissure. No pneumothorax. No significant pleural effusion.       Impression    IMPRESSION: Slight worsening of perihilar and bibasilar opacities,  likely pulmonary edema.    I have personally reviewed the examination and initial interpretation  and I agree with the findings.    ILYA HOBBS MD   XR Chest Port 1 View    Narrative    Exam: XR CHEST PORT 1 VW, 6/16/2017 8:11 AM    Indication: volume overloaded with HF, assess interval changes    Comparison: Previous day    Findings: Single frontal view of the chest is obtained. Stable  placement of 3-lead implantable cardiac defibrillator. Stable  enlargement of the cardiac silhouette. Mediastinum is normal.  Improving bibasilar pulmonary opacities. No appreciable pleural  effusion or pneumothorax. Decreased pulmonary vascularity. Decreased  mild retrocardiac opacities.      Impression    Impression:   1. Decreased pulmonary edema.  2. Stable cardiomegaly.    I have personally reviewed the examination and initial interpretation  and I agree with the findings.    ILYA HOBBS MD     No results found for this or any previous visit (from the past 24 hour(s)).    Pt discussed with attending physician, MD Martínez Burks MD, PhD  Internal Medicine PGY-1  Pager [phone]    I personally  provided care for this patient, reviewed chart, discussed course with patient, housestaff and consulting physicians.  I answered all questions.    Duran Mccarty M.D.  Division of Cardiology  Department of Medicine

## 2017-06-17 NOTE — PLAN OF CARE
Problem: Goal Outcome Summary  Goal: Goal Outcome Summary  Outcome: Improving  D: Admit 6/15 with worsening SOB.   I/A: Alert, oriented. Reported headache; not managed with Tylenol; new orders for Excedrin, Flonase, and saline nasal spray. Update MD if not improving following new orders. Denies SOB; some dyspnea on exertion. Up in hallway ambulating. Voiding good amounts; not recorded; patient reminded to measure urine for output measurement. BS . V paced. Duonebs q4h while awake. PIV SL. IV Lasix. Patient reports greatly improved breathing in comparison to 6/15.   P: Plan for LVAD placement on 6/19. Update CARDS 2 if questions/concerns.   Tori Marcus RN

## 2017-06-17 NOTE — PLAN OF CARE
Problem: Goal Outcome Summary  Goal: Goal Outcome Summary  Vital signs stable, rhythm paced, patient pleasant and reports feeling much improved compared to yesterday. Weight up about one Kilogram compared to yesterday. Moderate amount urine output since getting IV lasix dose last evening. Glucose 106 at 0200. Patient on room air when awake, wears home CPAP when sleeping. Plan LVAD placement on Monday.

## 2017-06-18 ENCOUNTER — APPOINTMENT (OUTPATIENT)
Dept: CARDIOLOGY | Facility: CLINIC | Age: 62
DRG: 001 | End: 2017-06-18
Attending: INTERNAL MEDICINE
Payer: COMMERCIAL

## 2017-06-18 ENCOUNTER — ANESTHESIA EVENT (OUTPATIENT)
Dept: SURGERY | Facility: CLINIC | Age: 62
DRG: 001 | End: 2017-06-18
Payer: COMMERCIAL

## 2017-06-18 ENCOUNTER — APPOINTMENT (OUTPATIENT)
Dept: GENERAL RADIOLOGY | Facility: CLINIC | Age: 62
DRG: 001 | End: 2017-06-18
Attending: INTERNAL MEDICINE
Payer: COMMERCIAL

## 2017-06-18 LAB
ANION GAP SERPL CALCULATED.3IONS-SCNC: 6 MMOL/L (ref 3–14)
BASE EXCESS BLDV CALC-SCNC: 3.5 MMOL/L
BASE EXCESS BLDV CALC-SCNC: 3.9 MMOL/L
BASOPHILS # BLD AUTO: 0.1 10E9/L (ref 0–0.2)
BASOPHILS NFR BLD AUTO: 1.3 %
BUN SERPL-MCNC: 29 MG/DL (ref 7–30)
CA-I BLD-MCNC: 4.6 MG/DL (ref 4.4–5.2)
CALCIUM SERPL-MCNC: 8.8 MG/DL (ref 8.5–10.1)
CHLORIDE SERPL-SCNC: 105 MMOL/L (ref 94–109)
CO2 SERPL-SCNC: 27 MMOL/L (ref 20–32)
CREAT SERPL-MCNC: 1.42 MG/DL (ref 0.66–1.25)
DIFFERENTIAL METHOD BLD: ABNORMAL
EOSINOPHIL # BLD AUTO: 0.2 10E9/L (ref 0–0.7)
EOSINOPHIL NFR BLD AUTO: 3.4 %
ERYTHROCYTE [DISTWIDTH] IN BLOOD BY AUTOMATED COUNT: 13.3 % (ref 10–15)
GFR SERPL CREATININE-BSD FRML MDRD: 51 ML/MIN/1.7M2
GLUCOSE BLDC GLUCOMTR-MCNC: 106 MG/DL (ref 70–99)
GLUCOSE BLDC GLUCOMTR-MCNC: 112 MG/DL (ref 70–99)
GLUCOSE BLDC GLUCOMTR-MCNC: 128 MG/DL (ref 70–99)
GLUCOSE BLDC GLUCOMTR-MCNC: 129 MG/DL (ref 70–99)
GLUCOSE BLDC GLUCOMTR-MCNC: 137 MG/DL (ref 70–99)
GLUCOSE SERPL-MCNC: 111 MG/DL (ref 70–99)
HCO3 BLDV-SCNC: 28 MMOL/L (ref 21–28)
HCO3 BLDV-SCNC: 29 MMOL/L (ref 21–28)
HCT VFR BLD AUTO: 32.8 % (ref 40–53)
HGB BLD-MCNC: 10.6 G/DL (ref 13.3–17.7)
IMM GRANULOCYTES # BLD: 0 10E9/L (ref 0–0.4)
IMM GRANULOCYTES NFR BLD: 0.2 %
INR PPP: 1.25 (ref 0.86–1.14)
LACTATE BLD-SCNC: 0.5 MMOL/L (ref 0.7–2.1)
LMWH PPP CHRO-ACNC: 0.15 IU/ML
LYMPHOCYTES # BLD AUTO: 2 10E9/L (ref 0.8–5.3)
LYMPHOCYTES NFR BLD AUTO: 38.1 %
MAGNESIUM SERPL-MCNC: 1.9 MG/DL (ref 1.6–2.3)
MCH RBC QN AUTO: 29.4 PG (ref 26.5–33)
MCHC RBC AUTO-ENTMCNC: 32.3 G/DL (ref 31.5–36.5)
MCV RBC AUTO: 91 FL (ref 78–100)
MONOCYTES # BLD AUTO: 0.5 10E9/L (ref 0–1.3)
MONOCYTES NFR BLD AUTO: 9.5 %
NEUTROPHILS # BLD AUTO: 2.6 10E9/L (ref 1.6–8.3)
NEUTROPHILS NFR BLD AUTO: 47.5 %
NRBC # BLD AUTO: 0 10*3/UL
NRBC BLD AUTO-RTO: 0 /100
O2/TOTAL GAS SETTING VFR VENT: 21 %
O2/TOTAL GAS SETTING VFR VENT: 21 %
OXYHGB MFR BLDV: 60 %
OXYHGB MFR BLDV: 65 %
PCO2 BLDV: 44 MM HG (ref 40–50)
PCO2 BLDV: 45 MM HG (ref 40–50)
PH BLDV: 7.41 PH (ref 7.32–7.43)
PH BLDV: 7.42 PH (ref 7.32–7.43)
PLATELET # BLD AUTO: 221 10E9/L (ref 150–450)
PO2 BLDV: 35 MM HG (ref 25–47)
PO2 BLDV: 39 MM HG (ref 25–47)
POTASSIUM SERPL-SCNC: 3.7 MMOL/L (ref 3.4–5.3)
POTASSIUM SERPL-SCNC: 4 MMOL/L (ref 3.4–5.3)
RBC # BLD AUTO: 3.6 10E12/L (ref 4.4–5.9)
SODIUM SERPL-SCNC: 138 MMOL/L (ref 133–144)
WBC # BLD AUTO: 5.4 10E9/L (ref 4–11)

## 2017-06-18 PROCEDURE — 36415 COLL VENOUS BLD VENIPUNCTURE: CPT | Performed by: STUDENT IN AN ORGANIZED HEALTH CARE EDUCATION/TRAINING PROGRAM

## 2017-06-18 PROCEDURE — 4A023N6 MEASUREMENT OF CARDIAC SAMPLING AND PRESSURE, RIGHT HEART, PERCUTANEOUS APPROACH: ICD-10-PCS | Performed by: INTERNAL MEDICINE

## 2017-06-18 PROCEDURE — 94640 AIRWAY INHALATION TREATMENT: CPT | Mod: 76

## 2017-06-18 PROCEDURE — 99152 MOD SED SAME PHYS/QHP 5/>YRS: CPT

## 2017-06-18 PROCEDURE — 86923 COMPATIBILITY TEST ELECTRIC: CPT | Performed by: SURGERY

## 2017-06-18 PROCEDURE — 93451 RIGHT HEART CATH: CPT

## 2017-06-18 PROCEDURE — C1769 GUIDE WIRE: HCPCS

## 2017-06-18 PROCEDURE — 27210787 ZZH MANIFOLD CR2

## 2017-06-18 PROCEDURE — 27210139 ZZH KIT CATH SVO2/CCO PA SUPPLY

## 2017-06-18 PROCEDURE — 83605 ASSAY OF LACTIC ACID: CPT

## 2017-06-18 PROCEDURE — 25000125 ZZHC RX 250: Performed by: INTERNAL MEDICINE

## 2017-06-18 PROCEDURE — 25000125 ZZHC RX 250: Performed by: STUDENT IN AN ORGANIZED HEALTH CARE EDUCATION/TRAINING PROGRAM

## 2017-06-18 PROCEDURE — 93451 RIGHT HEART CATH: CPT | Mod: 26 | Performed by: INTERNAL MEDICINE

## 2017-06-18 PROCEDURE — 33967 INSERT I-AORT PERCUT DEVICE: CPT

## 2017-06-18 PROCEDURE — 85520 HEPARIN ASSAY: CPT | Performed by: INTERNAL MEDICINE

## 2017-06-18 PROCEDURE — 5A02210 ASSISTANCE WITH CARDIAC OUTPUT USING BALLOON PUMP, CONTINUOUS: ICD-10-PCS | Performed by: INTERNAL MEDICINE

## 2017-06-18 PROCEDURE — 84132 ASSAY OF SERUM POTASSIUM: CPT | Performed by: INTERNAL MEDICINE

## 2017-06-18 PROCEDURE — 25000132 ZZH RX MED GY IP 250 OP 250 PS 637: Performed by: SURGERY

## 2017-06-18 PROCEDURE — 86850 RBC ANTIBODY SCREEN: CPT | Performed by: SURGERY

## 2017-06-18 PROCEDURE — 20000004 ZZH R&B ICU UMMC

## 2017-06-18 PROCEDURE — 27211089 ZZH KIT ACIST INJECTOR CR3

## 2017-06-18 PROCEDURE — 86901 BLOOD TYPING SEROLOGIC RH(D): CPT | Performed by: SURGERY

## 2017-06-18 PROCEDURE — 40000275 ZZH STATISTIC RCP TIME EA 10 MIN

## 2017-06-18 PROCEDURE — 83735 ASSAY OF MAGNESIUM: CPT | Performed by: INTERNAL MEDICINE

## 2017-06-18 PROCEDURE — 40000940 XR CHEST PORT 1 VW

## 2017-06-18 PROCEDURE — 25000132 ZZH RX MED GY IP 250 OP 250 PS 637: Performed by: STUDENT IN AN ORGANIZED HEALTH CARE EDUCATION/TRAINING PROGRAM

## 2017-06-18 PROCEDURE — 33967 INSERT I-AORT PERCUT DEVICE: CPT | Mod: 59 | Performed by: INTERNAL MEDICINE

## 2017-06-18 PROCEDURE — 25000128 H RX IP 250 OP 636

## 2017-06-18 PROCEDURE — 85610 PROTHROMBIN TIME: CPT | Performed by: STUDENT IN AN ORGANIZED HEALTH CARE EDUCATION/TRAINING PROGRAM

## 2017-06-18 PROCEDURE — 00000146 ZZHCL STATISTIC GLUCOSE BY METER IP

## 2017-06-18 PROCEDURE — 85025 COMPLETE CBC W/AUTO DIFF WBC: CPT | Performed by: STUDENT IN AN ORGANIZED HEALTH CARE EDUCATION/TRAINING PROGRAM

## 2017-06-18 PROCEDURE — 80048 BASIC METABOLIC PNL TOTAL CA: CPT | Performed by: STUDENT IN AN ORGANIZED HEALTH CARE EDUCATION/TRAINING PROGRAM

## 2017-06-18 PROCEDURE — 27210825 ZZH BALLOON TIP PRESSURE CR11

## 2017-06-18 PROCEDURE — 82330 ASSAY OF CALCIUM: CPT

## 2017-06-18 PROCEDURE — C1894 INTRO/SHEATH, NON-LASER: HCPCS

## 2017-06-18 PROCEDURE — 40000048 ZZH STATISTIC DAILY SWAN MONITORING

## 2017-06-18 PROCEDURE — 25000128 H RX IP 250 OP 636: Performed by: INTERNAL MEDICINE

## 2017-06-18 PROCEDURE — 71010 XR CHEST PORT 1 VW: CPT

## 2017-06-18 PROCEDURE — 40000076 ZZH STATISTIC IABP MONITORING

## 2017-06-18 PROCEDURE — 40000281 ZZH STATISTIC TRANSPORT TIME EA 15 MIN

## 2017-06-18 PROCEDURE — 40000196 ZZH STATISTIC RAPCV CVP MONITORING

## 2017-06-18 PROCEDURE — 94640 AIRWAY INHALATION TREATMENT: CPT

## 2017-06-18 PROCEDURE — 82805 BLOOD GASES W/O2 SATURATION: CPT | Performed by: INTERNAL MEDICINE

## 2017-06-18 PROCEDURE — 27210946 ZZH KIT HC TOTES DISP CR8

## 2017-06-18 PROCEDURE — 86900 BLOOD TYPING SEROLOGIC ABO: CPT | Performed by: SURGERY

## 2017-06-18 PROCEDURE — 40000081 ZZH STATISTIC INSERT IABP

## 2017-06-18 PROCEDURE — 99152 MOD SED SAME PHYS/QHP 5/>YRS: CPT | Performed by: INTERNAL MEDICINE

## 2017-06-18 PROCEDURE — 25000128 H RX IP 250 OP 636: Performed by: STUDENT IN AN ORGANIZED HEALTH CARE EDUCATION/TRAINING PROGRAM

## 2017-06-18 PROCEDURE — 82805 BLOOD GASES W/O2 SATURATION: CPT

## 2017-06-18 RX ORDER — NITROGLYCERIN 5 MG/ML
100-200 VIAL (ML) INTRAVENOUS
Status: DISCONTINUED | OUTPATIENT
Start: 2017-06-18 | End: 2017-06-18 | Stop reason: HOSPADM

## 2017-06-18 RX ORDER — FLUMAZENIL 0.1 MG/ML
0.2 INJECTION, SOLUTION INTRAVENOUS
Status: DISCONTINUED | OUTPATIENT
Start: 2017-06-18 | End: 2017-06-18 | Stop reason: HOSPADM

## 2017-06-18 RX ORDER — ONDANSETRON 2 MG/ML
4 INJECTION INTRAMUSCULAR; INTRAVENOUS EVERY 4 HOURS PRN
Status: DISCONTINUED | OUTPATIENT
Start: 2017-06-18 | End: 2017-06-18 | Stop reason: HOSPADM

## 2017-06-18 RX ORDER — PROTAMINE SULFATE 10 MG/ML
25-100 INJECTION, SOLUTION INTRAVENOUS EVERY 5 MIN PRN
Status: DISCONTINUED | OUTPATIENT
Start: 2017-06-18 | End: 2017-06-18 | Stop reason: HOSPADM

## 2017-06-18 RX ORDER — NIFEDIPINE 10 MG/1
10 CAPSULE ORAL
Status: DISCONTINUED | OUTPATIENT
Start: 2017-06-18 | End: 2017-06-18 | Stop reason: HOSPADM

## 2017-06-18 RX ORDER — EPTIFIBATIDE 2 MG/ML
2 INJECTION, SOLUTION INTRAVENOUS CONTINUOUS PRN
Status: DISCONTINUED | OUTPATIENT
Start: 2017-06-18 | End: 2017-06-18 | Stop reason: HOSPADM

## 2017-06-18 RX ORDER — DEXTROSE MONOHYDRATE 25 G/50ML
12.5-5 INJECTION, SOLUTION INTRAVENOUS EVERY 30 MIN PRN
Status: DISCONTINUED | OUTPATIENT
Start: 2017-06-18 | End: 2017-06-18 | Stop reason: HOSPADM

## 2017-06-18 RX ORDER — SODIUM NITROPRUSSIDE 25 MG/ML
100-200 INJECTION INTRAVENOUS
Status: DISCONTINUED | OUTPATIENT
Start: 2017-06-18 | End: 2017-06-18 | Stop reason: HOSPADM

## 2017-06-18 RX ORDER — ASPIRIN 325 MG
325 TABLET ORAL
Status: DISCONTINUED | OUTPATIENT
Start: 2017-06-18 | End: 2017-06-18 | Stop reason: HOSPADM

## 2017-06-18 RX ORDER — ENALAPRILAT 1.25 MG/ML
1.25-2.5 INJECTION INTRAVENOUS
Status: DISCONTINUED | OUTPATIENT
Start: 2017-06-18 | End: 2017-06-18 | Stop reason: HOSPADM

## 2017-06-18 RX ORDER — NICARDIPINE HYDROCHLORIDE 2.5 MG/ML
100 INJECTION INTRAVENOUS
Status: DISCONTINUED | OUTPATIENT
Start: 2017-06-18 | End: 2017-06-18 | Stop reason: HOSPADM

## 2017-06-18 RX ORDER — POTASSIUM CHLORIDE 7.45 MG/ML
10 INJECTION INTRAVENOUS
Status: DISCONTINUED | OUTPATIENT
Start: 2017-06-18 | End: 2017-06-18 | Stop reason: HOSPADM

## 2017-06-18 RX ORDER — HEPARIN SODIUM 1000 [USP'U]/ML
1000-10000 INJECTION, SOLUTION INTRAVENOUS; SUBCUTANEOUS EVERY 5 MIN PRN
Status: DISCONTINUED | OUTPATIENT
Start: 2017-06-18 | End: 2017-06-18 | Stop reason: HOSPADM

## 2017-06-18 RX ORDER — LEVOFLOXACIN 5 MG/ML
500 INJECTION, SOLUTION INTRAVENOUS SEE ADMIN INSTRUCTIONS
Status: DISCONTINUED | OUTPATIENT
Start: 2017-06-18 | End: 2017-06-20

## 2017-06-18 RX ORDER — VERAPAMIL HYDROCHLORIDE 2.5 MG/ML
1-5 INJECTION, SOLUTION INTRAVENOUS
Status: DISCONTINUED | OUTPATIENT
Start: 2017-06-18 | End: 2017-06-18 | Stop reason: HOSPADM

## 2017-06-18 RX ORDER — LIDOCAINE 50 MG/G
1 PATCH TOPICAL
Status: DISCONTINUED | OUTPATIENT
Start: 2017-06-18 | End: 2017-06-20

## 2017-06-18 RX ORDER — SODIUM CHLORIDE 9 MG/ML
INJECTION, SOLUTION INTRAVENOUS
Status: COMPLETED
Start: 2017-06-18 | End: 2017-06-18

## 2017-06-18 RX ORDER — CLOPIDOGREL BISULFATE 75 MG/1
300-600 TABLET ORAL
Status: DISCONTINUED | OUTPATIENT
Start: 2017-06-18 | End: 2017-06-18 | Stop reason: HOSPADM

## 2017-06-18 RX ORDER — ADENOSINE 3 MG/ML
12-12000 INJECTION, SOLUTION INTRAVENOUS
Status: DISCONTINUED | OUTPATIENT
Start: 2017-06-18 | End: 2017-06-18 | Stop reason: HOSPADM

## 2017-06-18 RX ORDER — CLOPIDOGREL BISULFATE 75 MG/1
75 TABLET ORAL
Status: DISCONTINUED | OUTPATIENT
Start: 2017-06-18 | End: 2017-06-18 | Stop reason: HOSPADM

## 2017-06-18 RX ORDER — NALOXONE HYDROCHLORIDE 0.4 MG/ML
0.4 INJECTION, SOLUTION INTRAMUSCULAR; INTRAVENOUS; SUBCUTANEOUS EVERY 5 MIN PRN
Status: DISCONTINUED | OUTPATIENT
Start: 2017-06-18 | End: 2017-06-18 | Stop reason: HOSPADM

## 2017-06-18 RX ORDER — ARGATROBAN 1 MG/ML
350 INJECTION, SOLUTION INTRAVENOUS
Status: DISCONTINUED | OUTPATIENT
Start: 2017-06-18 | End: 2017-06-18 | Stop reason: HOSPADM

## 2017-06-18 RX ORDER — NITROGLYCERIN 20 MG/100ML
.07-1.78 INJECTION INTRAVENOUS CONTINUOUS PRN
Status: DISCONTINUED | OUTPATIENT
Start: 2017-06-18 | End: 2017-06-18 | Stop reason: HOSPADM

## 2017-06-18 RX ORDER — MAGNESIUM HYDROXIDE 1200 MG/15ML
1000 LIQUID ORAL CONTINUOUS PRN
Status: DISCONTINUED | OUTPATIENT
Start: 2017-06-18 | End: 2017-06-18 | Stop reason: HOSPADM

## 2017-06-18 RX ORDER — PROTAMINE SULFATE 10 MG/ML
1-5 INJECTION, SOLUTION INTRAVENOUS
Status: DISCONTINUED | OUTPATIENT
Start: 2017-06-18 | End: 2017-06-18 | Stop reason: HOSPADM

## 2017-06-18 RX ORDER — PROMETHAZINE HYDROCHLORIDE 25 MG/ML
6.25-25 INJECTION, SOLUTION INTRAMUSCULAR; INTRAVENOUS EVERY 4 HOURS PRN
Status: DISCONTINUED | OUTPATIENT
Start: 2017-06-18 | End: 2017-06-18 | Stop reason: HOSPADM

## 2017-06-18 RX ORDER — DOBUTAMINE HYDROCHLORIDE 200 MG/100ML
2-20 INJECTION INTRAVENOUS CONTINUOUS PRN
Status: DISCONTINUED | OUTPATIENT
Start: 2017-06-18 | End: 2017-06-18 | Stop reason: HOSPADM

## 2017-06-18 RX ORDER — FLUCONAZOLE 2 MG/ML
200 INJECTION, SOLUTION INTRAVENOUS
Status: COMPLETED | OUTPATIENT
Start: 2017-06-18 | End: 2017-06-19

## 2017-06-18 RX ORDER — PHENYLEPHRINE HCL IN 0.9% NACL 1 MG/10 ML
20-100 SYRINGE (ML) INTRAVENOUS
Status: DISCONTINUED | OUTPATIENT
Start: 2017-06-18 | End: 2017-06-18 | Stop reason: HOSPADM

## 2017-06-18 RX ORDER — NITROGLYCERIN 0.4 MG/1
0.4 TABLET SUBLINGUAL EVERY 5 MIN PRN
Status: DISCONTINUED | OUTPATIENT
Start: 2017-06-18 | End: 2017-06-18 | Stop reason: HOSPADM

## 2017-06-18 RX ORDER — EPTIFIBATIDE 2 MG/ML
180 INJECTION, SOLUTION INTRAVENOUS EVERY 10 MIN PRN
Status: DISCONTINUED | OUTPATIENT
Start: 2017-06-18 | End: 2017-06-18 | Stop reason: HOSPADM

## 2017-06-18 RX ORDER — METOPROLOL TARTRATE 1 MG/ML
5 INJECTION, SOLUTION INTRAVENOUS EVERY 5 MIN PRN
Status: DISCONTINUED | OUTPATIENT
Start: 2017-06-18 | End: 2017-06-18 | Stop reason: HOSPADM

## 2017-06-18 RX ORDER — ASPIRIN 81 MG/1
81-324 TABLET, CHEWABLE ORAL
Status: DISCONTINUED | OUTPATIENT
Start: 2017-06-18 | End: 2017-06-18 | Stop reason: HOSPADM

## 2017-06-18 RX ORDER — LIDOCAINE HYDROCHLORIDE 10 MG/ML
30 INJECTION, SOLUTION EPIDURAL; INFILTRATION; INTRACAUDAL; PERINEURAL
Status: DISCONTINUED | OUTPATIENT
Start: 2017-06-18 | End: 2017-06-18 | Stop reason: HOSPADM

## 2017-06-18 RX ORDER — FUROSEMIDE 10 MG/ML
20-100 INJECTION INTRAMUSCULAR; INTRAVENOUS
Status: DISCONTINUED | OUTPATIENT
Start: 2017-06-18 | End: 2017-06-18 | Stop reason: HOSPADM

## 2017-06-18 RX ORDER — LORAZEPAM 2 MG/ML
.5-2 INJECTION INTRAMUSCULAR EVERY 4 HOURS PRN
Status: DISCONTINUED | OUTPATIENT
Start: 2017-06-18 | End: 2017-06-18 | Stop reason: HOSPADM

## 2017-06-18 RX ORDER — DOPAMINE HYDROCHLORIDE 160 MG/100ML
2-20 INJECTION, SOLUTION INTRAVENOUS CONTINUOUS PRN
Status: DISCONTINUED | OUTPATIENT
Start: 2017-06-18 | End: 2017-06-18 | Stop reason: HOSPADM

## 2017-06-18 RX ORDER — PRASUGREL 10 MG/1
10-60 TABLET, FILM COATED ORAL
Status: DISCONTINUED | OUTPATIENT
Start: 2017-06-18 | End: 2017-06-18 | Stop reason: HOSPADM

## 2017-06-18 RX ORDER — ATROPINE SULFATE 0.1 MG/ML
.5-1 INJECTION INTRAVENOUS
Status: DISCONTINUED | OUTPATIENT
Start: 2017-06-18 | End: 2017-06-18 | Stop reason: HOSPADM

## 2017-06-18 RX ORDER — OXYCODONE HCL 5 MG/5 ML
5 SOLUTION, ORAL ORAL EVERY 4 HOURS PRN
Status: DISCONTINUED | OUTPATIENT
Start: 2017-06-18 | End: 2017-06-20

## 2017-06-18 RX ORDER — ARGATROBAN 1 MG/ML
150 INJECTION, SOLUTION INTRAVENOUS
Status: DISCONTINUED | OUTPATIENT
Start: 2017-06-18 | End: 2017-06-18 | Stop reason: HOSPADM

## 2017-06-18 RX ORDER — DIPHENHYDRAMINE HYDROCHLORIDE 50 MG/ML
25-50 INJECTION INTRAMUSCULAR; INTRAVENOUS
Status: COMPLETED | OUTPATIENT
Start: 2017-06-18 | End: 2017-06-18

## 2017-06-18 RX ORDER — HEPARIN SODIUM 10000 [USP'U]/100ML
0-3500 INJECTION, SOLUTION INTRAVENOUS CONTINUOUS
Status: DISCONTINUED | OUTPATIENT
Start: 2017-06-18 | End: 2017-06-20

## 2017-06-18 RX ORDER — FENTANYL CITRATE 50 UG/ML
25-50 INJECTION, SOLUTION INTRAMUSCULAR; INTRAVENOUS
Status: DISCONTINUED | OUTPATIENT
Start: 2017-06-18 | End: 2017-06-18 | Stop reason: HOSPADM

## 2017-06-18 RX ORDER — LEVOFLOXACIN 5 MG/ML
500 INJECTION, SOLUTION INTRAVENOUS
Status: COMPLETED | OUTPATIENT
Start: 2017-06-18 | End: 2017-06-19

## 2017-06-18 RX ORDER — MUPIROCIN 20 MG/G
1 OINTMENT TOPICAL 2 TIMES DAILY
Status: COMPLETED | OUTPATIENT
Start: 2017-06-18 | End: 2017-06-19

## 2017-06-18 RX ORDER — METHYLPREDNISOLONE SODIUM SUCCINATE 125 MG/2ML
125 INJECTION, POWDER, LYOPHILIZED, FOR SOLUTION INTRAMUSCULAR; INTRAVENOUS
Status: DISCONTINUED | OUTPATIENT
Start: 2017-06-18 | End: 2017-06-18 | Stop reason: HOSPADM

## 2017-06-18 RX ORDER — NITROGLYCERIN 5 MG/ML
100-500 VIAL (ML) INTRAVENOUS
Status: DISCONTINUED | OUTPATIENT
Start: 2017-06-18 | End: 2017-06-18 | Stop reason: HOSPADM

## 2017-06-18 RX ORDER — HYDRALAZINE HYDROCHLORIDE 20 MG/ML
10-20 INJECTION INTRAMUSCULAR; INTRAVENOUS
Status: DISCONTINUED | OUTPATIENT
Start: 2017-06-18 | End: 2017-06-18 | Stop reason: HOSPADM

## 2017-06-18 RX ORDER — POTASSIUM CHLORIDE 29.8 MG/ML
20 INJECTION INTRAVENOUS
Status: DISCONTINUED | OUTPATIENT
Start: 2017-06-18 | End: 2017-06-18 | Stop reason: HOSPADM

## 2017-06-18 RX ADMIN — COLCHICINE 0.6 MG: 0.6 TABLET, FILM COATED ORAL at 08:40

## 2017-06-18 RX ADMIN — FENTANYL CITRATE 50 MCG: 50 INJECTION, SOLUTION INTRAMUSCULAR; INTRAVENOUS at 10:22

## 2017-06-18 RX ADMIN — ALLOPURINOL 50 MG: 100 TABLET ORAL at 08:39

## 2017-06-18 RX ADMIN — CITALOPRAM HYDROBROMIDE 20 MG: 20 TABLET ORAL at 08:40

## 2017-06-18 RX ADMIN — DIPHENHYDRAMINE HYDROCHLORIDE 50 MG: 50 INJECTION, SOLUTION INTRAMUSCULAR; INTRAVENOUS at 10:22

## 2017-06-18 RX ADMIN — Medication 1 G: at 08:39

## 2017-06-18 RX ADMIN — LIDOCAINE HYDROCHLORIDE 10 ML: 20 JELLY TOPICAL at 14:51

## 2017-06-18 RX ADMIN — FUROSEMIDE 40 MG: 10 INJECTION, SOLUTION INTRAVENOUS at 19:52

## 2017-06-18 RX ADMIN — UMECLIDINIUM 1 PUFF: 62.5 AEROSOL, POWDER ORAL at 08:39

## 2017-06-18 RX ADMIN — ACETAMINOPHEN 650 MG: 325 TABLET, FILM COATED ORAL at 12:59

## 2017-06-18 RX ADMIN — FUROSEMIDE 40 MG: 10 INJECTION, SOLUTION INTRAVENOUS at 14:51

## 2017-06-18 RX ADMIN — HYDRALAZINE HYDROCHLORIDE 10 MG: 10 TABLET ORAL at 19:52

## 2017-06-18 RX ADMIN — HEPARIN SODIUM 5000 UNITS: 1000 INJECTION, SOLUTION INTRAVENOUS; SUBCUTANEOUS at 10:52

## 2017-06-18 RX ADMIN — MONTELUKAST SODIUM 10 MG: 10 TABLET, FILM COATED ORAL at 22:50

## 2017-06-18 RX ADMIN — SENNOSIDES AND DOCUSATE SODIUM 2 TABLET: 8.6; 5 TABLET ORAL at 08:40

## 2017-06-18 RX ADMIN — IPRATROPIUM BROMIDE AND ALBUTEROL SULFATE 3 ML: .5; 3 SOLUTION RESPIRATORY (INHALATION) at 16:04

## 2017-06-18 RX ADMIN — HYDRALAZINE HYDROCHLORIDE 10 MG: 10 TABLET ORAL at 15:12

## 2017-06-18 RX ADMIN — MUPIROCIN 1 G: 20 OINTMENT TOPICAL at 20:43

## 2017-06-18 RX ADMIN — Medication 220 MG: at 08:40

## 2017-06-18 RX ADMIN — Medication: at 19:53

## 2017-06-18 RX ADMIN — Medication 220 MG: at 19:52

## 2017-06-18 RX ADMIN — FLUTICASONE FUROATE AND VILANTEROL TRIFENATATE 1 PUFF: 200; 25 POWDER RESPIRATORY (INHALATION) at 08:39

## 2017-06-18 RX ADMIN — IPRATROPIUM BROMIDE AND ALBUTEROL SULFATE 3 ML: .5; 3 SOLUTION RESPIRATORY (INHALATION) at 20:16

## 2017-06-18 RX ADMIN — POTASSIUM CHLORIDE 20 MEQ: 750 TABLET, EXTENDED RELEASE ORAL at 08:54

## 2017-06-18 RX ADMIN — MIDAZOLAM HYDROCHLORIDE 2 MG: 1 INJECTION, SOLUTION INTRAMUSCULAR; INTRAVENOUS at 10:52

## 2017-06-18 RX ADMIN — FENTANYL CITRATE 50 MCG: 50 INJECTION, SOLUTION INTRAMUSCULAR; INTRAVENOUS at 10:37

## 2017-06-18 RX ADMIN — OXYCODONE HYDROCHLORIDE 2.5 MG: 5 SOLUTION ORAL at 13:17

## 2017-06-18 RX ADMIN — OXYCODONE HYDROCHLORIDE 5 MG: 5 SOLUTION ORAL at 16:43

## 2017-06-18 RX ADMIN — OXYCODONE HYDROCHLORIDE 5 MG: 5 SOLUTION ORAL at 19:10

## 2017-06-18 RX ADMIN — Medication 2 G: at 20:15

## 2017-06-18 RX ADMIN — SODIUM CHLORIDE 1000 ML: 9 INJECTION, SOLUTION INTRAVENOUS at 20:42

## 2017-06-18 RX ADMIN — HEPARIN SODIUM 1200 UNITS/HR: 10000 INJECTION, SOLUTION INTRAVENOUS at 10:49

## 2017-06-18 RX ADMIN — OXYCODONE HYDROCHLORIDE 5 MG: 5 SOLUTION ORAL at 21:52

## 2017-06-18 RX ADMIN — HYDRALAZINE HYDROCHLORIDE 10 MG: 10 TABLET ORAL at 08:40

## 2017-06-18 RX ADMIN — IPRATROPIUM BROMIDE AND ALBUTEROL SULFATE 3 ML: .5; 3 SOLUTION RESPIRATORY (INHALATION) at 12:33

## 2017-06-18 RX ADMIN — FLUTICASONE PROPIONATE 1 SPRAY: 50 SPRAY, METERED NASAL at 08:39

## 2017-06-18 RX ADMIN — TRAZODONE HYDROCHLORIDE 50 MG: 50 TABLET ORAL at 23:00

## 2017-06-18 RX ADMIN — ACETAMINOPHEN 650 MG: 325 TABLET, FILM COATED ORAL at 21:52

## 2017-06-18 RX ADMIN — POTASSIUM CHLORIDE 20 MEQ: 29.8 INJECTION, SOLUTION INTRAVENOUS at 21:55

## 2017-06-18 RX ADMIN — Medication: at 16:40

## 2017-06-18 RX ADMIN — IPRATROPIUM BROMIDE AND ALBUTEROL SULFATE 3 ML: .5; 3 SOLUTION RESPIRATORY (INHALATION) at 08:59

## 2017-06-18 RX ADMIN — LIDOCAINE 1 PATCH: 50 PATCH CUTANEOUS at 16:41

## 2017-06-18 RX ADMIN — Medication 2 SPRAY: at 08:39

## 2017-06-18 ASSESSMENT — PAIN DESCRIPTION - DESCRIPTORS
DESCRIPTORS: ACHING
DESCRIPTORS: ACHING;BURNING
DESCRIPTORS: ACHING

## 2017-06-18 NOTE — PLAN OF CARE
Problem: Goal Outcome Summary  Goal: Goal Outcome Summary  Outcome: No Change  Transfer  Transferred to: cath lab for balloon pump placement, then cath lab.  Via: wheelchair  Reason for transfer: Pt taken to cath lab for balloon pump placement, then to 4E prior to LVAD placement 4/19.   Family: Aware of transfer  Belongings: Sent with pt  Chart: Sent with pt  Medications: Meds from bin sent with pt  Report called to: BRIANNA, Megan MONTELONGO     Patient NPO since midnight. Meds given this am; lasix not available on floor, reported to 4E to give when patient arrives on unit. Ketamine cream applied to L elbow, pt reports improved discomfort. Pt reports slept well with Trazodone. . Neb treatment received prior to leaving for cath lab. Patient left floor 6c at 0950.   Tori Marcus RN

## 2017-06-18 NOTE — PROGRESS NOTES
D: 61 yo male transferred from  to cardiac cath lab for pre-op placement of IABP and C[facility] [place] with scheduled LVAD placement tomorrow (Monday, 6/19) with Dr Quigley.    I/A: received on 4E A 7 O x4 with RFA IABP in place augmenting @ 100% with augmented SBP of 80-90, RIJ PA catheter in place @ 58 cm- see hemodynamics in flow sheet.    P: introduced wife and pt to unit, oriented to call light and ROM restrictions due to IABP.  SvO2 VBG sent to calibrate CO monitor.  Will notify team of arrival to ICU.

## 2017-06-18 NOTE — PLAN OF CARE
Problem: Goal Outcome Summary  Goal: Goal Outcome Summary  Patient got Ketamine cream to elbow tonight, patient not sure if it was helping or not. Patient calm and pleasant, got Trazodone at bedtime and appears sleeping all night. Patient NPO since midnight, to have [place] placement today at 1000 and LVAD placement Monday. Patient wears CPAP at night. Patient and spouse aware of plan. Glucose 128 at 0200. Vital signs stable, patient voiding via urinal, rhythm paced.

## 2017-06-18 NOTE — PROGRESS NOTES
D: 7.5 fr 40 CC IABP placed in pt.'s RFA by M.D. In cath lab for Pre-op  A: placement confirmed by flouro. IABP in auto mode 1: 1 100%  P; cont to monitor

## 2017-06-18 NOTE — PROCEDURES
PRELIMINARY CARDIAC CATH REPORT:     PROCEDURES PERFORMED:   1. Intraaortic balloon pump placement.  2. Right heart catheterization.        PHYSICIANS:  1. Attending Interventional Cardiology Staff: Bj Costello MD  2. Interventional Cardiology Fellow: Jermaine Quach DO       INDICATION:  Jim Willingham is a 62 year old male with end-stage heart failure who is undergoing LVAD placement tomorrow AM. Per CT surgery, IABP and PA catheter placement prior to thoracic surgery.     DESCRIPTION:  1. Consent obtained with discussion of risks.  All questions were answered.  2. Sterile prep and procedure.  3. Location: right common femoral artery and right internal jugular vein.  4. Access: Local anesthetic with lidocaine.  A standard (18 g) needle with ultrasound guidance was used to establish vascular access using a modified Seldinger technique.  5. Sheath: 9Fr MAC evgeny in IJ, 7Fr IABP sheath in RFA.   6. Catheters: 7Fr Shamrock-Aashish, 40cc IABP.  7. Fluoroscopy time of 1.7 min.  8. Estimated blood loss of <10 mL.  9. See below for procedure details.    MEDICATIONS:  1. Conscious sedation with fentanyl and midazolam as directed by the attending cardiologist.  2. Heparin 5000 U bolus.       CONSCIOUS SEDATION:   The procedure was performed under conscious sedation for 22 min.  A total of 2 mg Versed and 100 mcg Fentanyl were used. Heart rate, blood pressure, respiration, oxygen saturation, and patient responses were monitored throughout the procedure with RN assistance.       HEMODYNAMICS:  1. HR 80 bpm  2. Ao /70/81 mmHg    RIGHT HEART CATHETERIZATION:  BSA 2.32  1. RA 10/7/10   2. RV 50/10  3. PA 50/24/39   4. PCW 31/51/28   5. PA sat 64.7%   6. PCW Oximeter sat, not obtained  7. Hgb 10.2 g/dL   8. Kee CO 5.9   9. Kee CI 2.5   10. PVR 1.7      INTRA-AORTIC BALLOON PUMP INSERTION:  1. A 7.5Fr 40 mL IABP was inserted into the right femoral artery under fluoroscopic guidance.      COMPLICATIONS:  1. None      SUMMARY:    1. Advanced heart failure.  2. Mildly elevated right-sided and increased left-sided filling pressures.  3. Moderate secondary pulmonary hypertension with a mean PAP of 39 mmHg.  4. Normal cardiac output of 5.9 L/min and cardiac index of 2.5 based on Kee estimation.  5. Successful placement of a 40cc IABP.   6. Sheath was secured in place.    PLAN:   1. IABP monitoring/management per protocol.  2. PA catheter monitoring/management per protocol.   3. Bedrest and access site monitoring per protocol.  4. Return to the primary inpatient team for further evaluation and management.      The attending interventional cardiologist was present for the entire procedure.      See CVIS report for final draft.    Jermaine Quach DO, [facility]  Interventional Cardiology Fellow  [phone]

## 2017-06-18 NOTE — PROGRESS NOTES
Cardiology Progress Note June 16, 2017  ===================================================  Assessment & Plan ; Hospital Day #3    62M h/o NICM (EF 20%) s/p Bi-V ICD, severe MR, VT, paroxsymal afib, DM, HTN, CKD, CECILIA, MDD, admitted for gradual shortness of breath, likely 2/2 to inadequate maintenance diuretics. Improving with BiPAP. Grace and balloon pump 6/18. LVAD on 6/19.     Today's Change and Brief Plan  - Grace and balloon pump insertion on 6/18, 10am  - Grace #s at 14:00  MAP 75  CVP 12  PA 36/20/25  PCWP 16      NICM (EF20%)  Combined Systolic Diastolic Heart Failure  - LVAD implantation scheduled for Monday, 6/19  - Grace and balloon pump inserted 6/18 pre-procedure  - numbers suggest appropriate volume status  - holding metoprolol, digoxin, spironolactone and losartan  - holding warfarin due to upcoming surgery  - will refrain from bridging with heparin gtt as well ([id number] = 3)       Acute gout  - cont allopurinol and colchicine  - will f/u outpt with uric acid to titrate allopurinol to keep uric acid level below 6     CKD  - baseline SCr between 1.4-1.5  - likely 2/2 DM2 and HF  - trend SCr      Chronic Conditions  H/o of ventricular tachycardia: Medtronic ICD interrogated, no events since 3 weeks ago, continue amiodarone 100 mg qday  Paroxsymal Afib: [id number] = 3, continue amiodarone 100 mg qday  Asthma, COPD: continue PTA umeclidinium, singulair, scheduling duonebs q4 while awake for now, decrease as tolerated  - needs repeat PFTs at some point  DM: holding oral antiglycemics, SSI ordered   CECILIA: continue CPAP on home settings  Depression: continue home citalopram 20 mg qday      FEN: 2 gram sodium diet  PROPHY: mechanical, already on warfarin  LINES:  peripheral IV  DISPO:  inpt admission for ambulatory cardiogenic shock and LVAD placement  CODE STATUS:  full code     =====================================================  Care team notes last 24 hours reviewed.  No acute events overnight  No  "SOB.  Mild back pain.  Wife at bedside, nervous but consolable.     Review of Symptoms  3-point ROS reviewed & found negative.  Skin:no  GI:no  Respiratory:no  ==================================================  Objective:  /64 (BP Location: Left arm)  Pulse 87  Temp 98.5  F (36.9  C) (Oral)  Resp 19  Ht 1.727 m (5' 8\")  Wt 112.5 kg (248 lb 1.6 oz)  SpO2 96%  BMI 37.72 kg/m2  Temp (24hrs), Av.6  F (37  C), Min:97.7  F (36.5  C), Max:99.5  F (37.5  C)    Patient Vitals for the past 24 hrs:   BP Temp Temp src Heart Rate Resp SpO2 Weight   17 1115 107/64 - - - 19 96 % -   17 0828 107/78 98.5  F (36.9  C) Oral 83 20 97 % -   17 0607 - - - - - - 112.5 kg (248 lb 1.6 oz)   17 2200 100/74 98.3  F (36.8  C) Oral 97 18 96 % -   17 2144 - - - - - 96 % -   17 1944 102/64 98.3  F (36.8  C) Oral 97 18 98 % -   17 1906 105/78 98.4  F (36.9  C) Oral 94 16 95 % -   17 1510 98/72 98.5  F (36.9  C) Oral 86 16 94 % -     BP - Mean:  [72-89] 78  CVP:  [8 mmHg] 8 mmHg    Vitals:    06/15/17 1141 17 1125 17 0400 17 0607   Weight: 114.8 kg (253 lb) 112.4 kg (247 lb 11.2 oz) 113.1 kg (249 lb 4.8 oz) 112.5 kg (248 lb 1.6 oz)     Intake/Output Summary (Last 24 hours) at 17 1439  Last data filed at 17 1400   Gross per 24 hour   Intake              780 ml   Output             1750 ml   Net             -970 ml     GEN:  Alert, oriented x 3, appears comfortable, NAD. Nasal NIPPV in place  HEENT: yellow catheter exiting R neck  CV:  Regular rate and rhythm, no murmur or JVD.  S1 + S2 noted, no S3 or S4.  LUNGS:  Clear to auscultation bilaterally   ABD:  Active bowel sounds, soft, non-tender/non-distended.  No rebound/guarding/rigidity.  EXT:  R groin with balloon pump catheter in place, exit site c/d/i      Medications   Current Facility-Administered Medications   Medication Dose Route Frequency     ketamine (KETALAR) 5%-gabapentin (NEURONTIN) " 8%-lidocaine 2.5%   Topical TID     umeclidinium  1 puff Inhalation Daily     fluticasone  1 spray Both Nostrils Daily     sodium chloride  2 spray Both Nostrils BID     ipratropium - albuterol 0.5 mg/2.5 mg/3 mL  3 mL Nebulization 4x Daily     sodium chloride (PF)  3 mL Intracatheter Q8H     senna-docusate  1-2 tablet Oral BID     citalopram  20 mg Oral Daily     [START ON 7/1/2017] cyanocobalamin  1,000 mcg Intramuscular Q30 Days     fluticasone-vilanterol  1 puff Inhalation Daily     montelukast  10 mg Oral At Bedtime     zinc sulfate  220 mg Oral BID     docusate sodium  100 mg Oral BID     sennosides  1 tablet Oral Daily     allopurinol  50 mg Oral Daily     colchicine  0.6 mg Oral Daily     hydrALAZINE  10 mg Oral TID     pneumococcal vaccine  0.5 mL Intramuscular Prior to discharge     insulin aspart  1-7 Units Subcutaneous TID AC     insulin aspart  1-5 Units Subcutaneous At Bedtime     furosemide  40 mg Intravenous TID     Infusions:    HEParin 1,200 Units/hr (06/18/17 1115)     PRN Medications:  heparin, magnesium sulfate, magnesium sulfate, potassium chloride, potassium chloride, potassium chloride, potassium chloride with lidocaine, potassium chloride, traZODone **OR** traZODone, naloxone, lidocaine, lidocaine 4%, sodium chloride (PF), acetaminophen, senna-docusate, polyethylene glycol, bisacodyl, ondansetron **OR** ondansetron, albuterol, diclofenac, oxyCODONE, glucose **OR** dextrose **OR** glucagon  Current Facility-Administered Medications   Medication     heparin  drip 25,000 units in 0.45% NaCl 250 mL (see additional administration details for dose)     heparin bolus from infusion pump     magnesium sulfate 2 g in NS intermittent infusion (PharMEDium or FV Cmpd)     magnesium sulfate 4 g in 100 mL sterile water (premade)     potassium chloride SA (K-DUR/KLOR-CON M) CR tablet 20-40 mEq     potassium chloride (KLOR-CON) Packet 20-40 mEq     potassium chloride 10 mEq in 100 mL intermittent infusion      potassium chloride 10 mEq in 100 mL intermittent infusion with 10 mg lidocaine     potassium chloride 20 mEq in 50 mL intermittent infusion     traZODone (DESYREL) half-tab 25 mg    Or     traZODone (DESYREL) tablet 50 mg     ketamine (KETALAR) 5%-gabapentin (NEURONTIN) 8%-lidocaine 2.5% topical PLO cream     umeclidinium (INCRUSE ELLIPTA) 62.5 MCG/INH oral inhaler 1 puff     fluticasone (FLONASE) 50 MCG/ACT spray 1 spray     sodium chloride (OCEAN) 0.65 % nasal spray 2 spray     ipratropium - albuterol 0.5 mg/2.5 mg/3 mL (DUONEB) neb solution 3 mL     naloxone (NARCAN) injection 0.1-0.4 mg     lidocaine 1 % 1 mL     lidocaine (LMX4) kit     sodium chloride (PF) 0.9% PF flush 3 mL     sodium chloride (PF) 0.9% PF flush 3 mL     acetaminophen (TYLENOL) tablet 650 mg     senna-docusate (SENOKOT-S;PERICOLACE) 8.6-50 MG per tablet 1-2 tablet     senna-docusate (SENOKOT-S;PERICOLACE) 8.6-50 MG per tablet 1-2 tablet     polyethylene glycol (MIRALAX/GLYCOLAX) Packet 17 g     bisacodyl (DULCOLAX) Suppository 10 mg     ondansetron (ZOFRAN-ODT) ODT tab 4 mg    Or     ondansetron (ZOFRAN) injection 4 mg     albuterol (PROAIR HFA/PROVENTIL HFA/VENTOLIN HFA) Inhaler 2 puff     citalopram (celeXA) tablet 20 mg     [START ON 7/1/2017] cyanocobalamin (VITAMIN B12) injection 1,000 mcg     fluticasone-vilanterol (BREO ELLIPTA) 200-25 MCG/INH oral inhaler 1 puff     montelukast (SINGULAIR) tablet 10 mg     zinc sulfate (ZINCATE) capsule 220 mg     diclofenac (VOLTAREN) 1 % topical gel 2 g     docusate sodium (COLACE) capsule 100 mg     sennosides (SENOKOT) tablet 1 tablet     allopurinol (ZYLOPRIM) half-tab 50 mg     oxyCODONE (ROXICODONE) solution 2.5 mg     colchicine tablet 0.6 mg     hydrALAZINE (APRESOLINE) tablet 10 mg     pneumococcal vaccine (PNEUMOVAX 23-carina) injection 0.5 mL     glucose 40 % gel 15-30 g    Or     dextrose 50 % injection 25-50 mL    Or     glucagon injection 1 mg     insulin aspart (NovoLOG) inj (RAPID  ACTING)     insulin aspart (NovoLOG) inj (RAPID ACTING)     furosemide (LASIX) injection 40 mg       Labs:  Data   CMP    Recent Labs  Lab 06/18/17  0621 06/17/17  1612 06/17/17  0658 06/16/17  0621 06/15/17  1155 06/13/17  0450 06/12/17  0446 06/11/17  1315     --  136 138 139 136 136 137   POTASSIUM 3.7 3.8 3.3* 3.8 4.0 4.2 4.6 5.0   CHLORIDE 105  --  102 104 103 106 103 104   CO2 27  --  27 29 29 24 25 28   ANIONGAP 6  --  6 5 7 6 7 4   *  --  113* 111* 109* 97 93 103*   BUN 29  --  31* 29 33* 35* 48* 48*   CR 1.42*  --  1.47* 1.48* 1.41* 1.33* 1.89* 2.14*   GFRESTIMATED 51*  --  49* 48* 51* 55* 37* 32*   GFRESTBLACK 62  --  59* 59* 62 66 44* 38*   QAMAR 8.8  --  8.5 9.1 9.0 8.6 8.6 8.3*   MAG  --   --   --   --   --  2.1 2.2 2.0   PHOS  --   --   --   --   --   --   --  3.3   PROTTOTAL  --   --   --   --  7.3  --   --  6.8   ALBUMIN  --   --   --   --  3.8  --   --  3.5   BILITOTAL  --   --   --   --  0.8  --   --  0.6   ALKPHOS  --   --   --   --  72  --   --  69   AST  --   --   --   --  23  --   --  27   ALT  --   --   --   --  26  --   --  27       CBC    Recent Labs  Lab 06/18/17  0621 06/17/17  0658 06/16/17  0621 06/15/17  1155   WBC 5.4 6.0 7.5 10.8   RBC 3.60* 3.51* 3.63* 3.61*   HGB 10.6* 10.4* 10.6* 10.7*   HCT 32.8* 31.9* 33.6* 33.6*   MCV 91 91 93 93   MCH 29.4 29.6 29.2 29.6   MCHC 32.3 32.6 31.5 31.8   RDW 13.3 13.5 13.7 13.8    216 208 215       TroponinNo results found for: TROPI, TROPONIN, TROPR, TROPN    Lipid  Recent Labs   Lab Test  06/08/17   0944   CHOL  173   HDL  49   LDL  99   TRIG  124       Microbiology:  Data   Most Recent 6 Bacteria Isolates From Any Culture (See EPIC Reports for Culture Details):  Recent Labs   Lab Test  05/24/17   0834  05/23/17   1930   CULT  No growth  Moderate growth Normal jaxon     Culture Micro   Date Value Ref Range Status   05/24/2017 No growth  Final   05/23/2017 Moderate growth Normal jaxon  Final          Imaging:  Data   Recent  Results (from the past 48 hour(s))   Chest XR,  PA & LAT    Narrative    EXAM: XR CHEST 2 VW  6/15/2017 12:45 PM     HISTORY: Dyspnea       COMPARISON: Chest radiograph 5/23/2017    FINDINGS: Upright PA and lateral views of chest. Cardio defibrillator  device is unchanged. Slightly worsening of perihilar and bibasilar  opacities. Increasing fluid vasculature Slight thickening of the right  minor fissure. No pneumothorax. No significant pleural effusion.       Impression    IMPRESSION: Slight worsening of perihilar and bibasilar opacities,  likely pulmonary edema.    I have personally reviewed the examination and initial interpretation  and I agree with the findings.    ILYA HOBBS MD   XR Chest Port 1 View    Narrative    Exam: XR CHEST PORT 1 VW, 6/16/2017 8:11 AM    Indication: volume overloaded with HF, assess interval changes    Comparison: Previous day    Findings: Single frontal view of the chest is obtained. Stable  placement of 3-lead implantable cardiac defibrillator. Stable  enlargement of the cardiac silhouette. Mediastinum is normal.  Improving bibasilar pulmonary opacities. No appreciable pleural  effusion or pneumothorax. Decreased pulmonary vascularity. Decreased  mild retrocardiac opacities.      Impression    Impression:   1. Decreased pulmonary edema.  2. Stable cardiomegaly.    I have personally reviewed the examination and initial interpretation  and I agree with the findings.    ILYA HOBBS MD     No results found for this or any previous visit (from the past 24 hour(s)).    Pt discussed with attending physician, MD Martínez Burks MD, PhD  Internal Medicine PGY-1  Pager [phone]    Faculty Attestation  Duran Mccarty M.D.    I personally saw and examined this patient, reviewed recent laboratories and imaging studies, discussed the case with the housestaff, and conveyed plan to the patient.  I answered all questions from patient and/or family. I agree with the  examination, assessment and plan outlined here.      General:  Chest:  Cor:  Abd:  Extr:

## 2017-06-18 NOTE — PROGRESS NOTES
Patient seen and examined. Investigations reviewed. Will proceed with HM II LVAD tomorrow. Risks and benefits of surgery discussed with patient and family including risks of death, stroke, infection renal and RV failure. He understands and is willing to proceed with surgery. Plan discussed with Heart Failure team who is in agreement to proceed.

## 2017-06-19 ENCOUNTER — ANESTHESIA (OUTPATIENT)
Dept: SURGERY | Facility: CLINIC | Age: 62
DRG: 001 | End: 2017-06-19
Payer: COMMERCIAL

## 2017-06-19 ENCOUNTER — DOCUMENTATION ONLY (OUTPATIENT)
Dept: CARDIOLOGY | Facility: CLINIC | Age: 62
End: 2017-06-19

## 2017-06-19 ENCOUNTER — APPOINTMENT (OUTPATIENT)
Dept: GENERAL RADIOLOGY | Facility: CLINIC | Age: 62
DRG: 001 | End: 2017-06-19
Attending: INTERNAL MEDICINE
Payer: COMMERCIAL

## 2017-06-19 LAB
ANION GAP SERPL CALCULATED.3IONS-SCNC: 6 MMOL/L (ref 3–14)
APTT PPP: 29 SEC (ref 22–37)
APTT PPP: 56 SEC (ref 22–37)
BASE EXCESS BLDV CALC-SCNC: 3.7 MMOL/L
BLD PROD TYP BPU: NORMAL
BLD UNIT ID BPU: 0
BLD UNIT ID BPU: 0
BLOOD PRODUCT CODE: NORMAL
BLOOD PRODUCT CODE: NORMAL
BPU ID: NORMAL
BPU ID: NORMAL
BUN SERPL-MCNC: 24 MG/DL (ref 7–30)
CALCIUM SERPL-MCNC: 8.8 MG/DL (ref 8.5–10.1)
CHLORIDE SERPL-SCNC: 105 MMOL/L (ref 94–109)
CO2 SERPL-SCNC: 26 MMOL/L (ref 20–32)
CREAT SERPL-MCNC: 1.49 MG/DL (ref 0.66–1.25)
ERYTHROCYTE [DISTWIDTH] IN BLOOD BY AUTOMATED COUNT: 13.3 % (ref 10–15)
FIBRINOGEN PPP-MCNC: 434 MG/DL (ref 200–420)
FIBRINOGEN PPP-MCNC: 487 MG/DL (ref 200–420)
GFR SERPL CREATININE-BSD FRML MDRD: 48 ML/MIN/1.7M2
GLUCOSE BLDC GLUCOMTR-MCNC: 103 MG/DL (ref 70–99)
GLUCOSE BLDC GLUCOMTR-MCNC: 93 MG/DL (ref 70–99)
GLUCOSE SERPL-MCNC: 92 MG/DL (ref 70–99)
HCO3 BLDV-SCNC: 29 MMOL/L (ref 21–28)
HCT VFR BLD AUTO: 31.6 % (ref 40–53)
HGB BLD-MCNC: 10.1 G/DL (ref 13.3–17.7)
INR PPP: 1.26 (ref 0.86–1.14)
INR PPP: 1.6 (ref 0.86–1.14)
INTERPRETATION ECG - MUSE: NORMAL
LMWH PPP CHRO-ACNC: 0.33 IU/ML
MAGNESIUM SERPL-MCNC: 2.1 MG/DL (ref 1.6–2.3)
MAGNESIUM SERPL-MCNC: 2.3 MG/DL (ref 1.6–2.3)
MCH RBC QN AUTO: 29.2 PG (ref 26.5–33)
MCHC RBC AUTO-ENTMCNC: 32 G/DL (ref 31.5–36.5)
MCV RBC AUTO: 91 FL (ref 78–100)
NUM BPU REQUESTED: 2
NUM BPU REQUESTED: 4
O2/TOTAL GAS SETTING VFR VENT: 21 %
OXYHGB MFR BLDV: 62 %
PCO2 BLDV: 44 MM HG (ref 40–50)
PH BLDV: 7.42 PH (ref 7.32–7.43)
PHOSPHATE SERPL-MCNC: 3 MG/DL (ref 2.5–4.5)
PLATELET # BLD AUTO: 187 10E9/L (ref 150–450)
PLATELET # BLD AUTO: 222 10E9/L (ref 150–450)
PO2 BLDV: 35 MM HG (ref 25–47)
POTASSIUM SERPL-SCNC: 4 MMOL/L (ref 3.4–5.3)
POTASSIUM SERPL-SCNC: 4.1 MMOL/L (ref 3.4–5.3)
RBC # BLD AUTO: 3.46 10E12/L (ref 4.4–5.9)
SODIUM SERPL-SCNC: 137 MMOL/L (ref 133–144)
TRANSFUSION STATUS PATIENT QL: NORMAL
WBC # BLD AUTO: 6.4 10E9/L (ref 4–11)

## 2017-06-19 PROCEDURE — 25000125 ZZHC RX 250: Performed by: SURGERY

## 2017-06-19 PROCEDURE — 27410247 ZZH DEVICE HM II POCKET SYSTEM CONTROLLER, INITIAL ONLY

## 2017-06-19 PROCEDURE — 27210995 ZZH RX 272: Performed by: SURGERY

## 2017-06-19 PROCEDURE — S0017 INJECTION, AMINOCAPROIC ACID: HCPCS | Performed by: INTERNAL MEDICINE

## 2017-06-19 PROCEDURE — 94640 AIRWAY INHALATION TREATMENT: CPT

## 2017-06-19 PROCEDURE — 02HA0QZ INSERTION OF IMPLANTABLE HEART ASSIST SYSTEM INTO HEART, OPEN APPROACH: ICD-10-PCS | Performed by: SURGERY

## 2017-06-19 PROCEDURE — 88305 TISSUE EXAM BY PATHOLOGIST: CPT | Performed by: SURGERY

## 2017-06-19 PROCEDURE — 94640 AIRWAY INHALATION TREATMENT: CPT | Mod: 76

## 2017-06-19 PROCEDURE — 86900 BLOOD TYPING SEROLOGIC ABO: CPT

## 2017-06-19 PROCEDURE — 99232 SBSQ HOSP IP/OBS MODERATE 35: CPT | Mod: GC | Performed by: INTERNAL MEDICINE

## 2017-06-19 PROCEDURE — 25000125 ZZHC RX 250: Performed by: STUDENT IN AN ORGANIZED HEALTH CARE EDUCATION/TRAINING PROGRAM

## 2017-06-19 PROCEDURE — 27410239 ZZH DEVICE HM II 14-V LITH BATTERY CLIP BOX, INITIAL ONLY

## 2017-06-19 PROCEDURE — 85610 PROTHROMBIN TIME: CPT | Performed by: INTERNAL MEDICINE

## 2017-06-19 PROCEDURE — 84132 ASSAY OF SERUM POTASSIUM: CPT | Performed by: ANESTHESIOLOGY

## 2017-06-19 PROCEDURE — 40000048 ZZH STATISTIC DAILY SWAN MONITORING

## 2017-06-19 PROCEDURE — 85049 AUTOMATED PLATELET COUNT: CPT | Performed by: INTERNAL MEDICINE

## 2017-06-19 PROCEDURE — P9016 RBC LEUKOCYTES REDUCED: HCPCS | Performed by: SURGERY

## 2017-06-19 PROCEDURE — 85384 FIBRINOGEN ACTIVITY: CPT | Performed by: INTERNAL MEDICINE

## 2017-06-19 PROCEDURE — 41000018 ZZH PER-PERFUSION 1ST 30 MIN: Performed by: SURGERY

## 2017-06-19 PROCEDURE — 80048 BASIC METABOLIC PNL TOTAL CA: CPT | Performed by: INTERNAL MEDICINE

## 2017-06-19 PROCEDURE — 25000128 H RX IP 250 OP 636: Performed by: INTERNAL MEDICINE

## 2017-06-19 PROCEDURE — 25000132 ZZH RX MED GY IP 250 OP 250 PS 637: Performed by: STUDENT IN AN ORGANIZED HEALTH CARE EDUCATION/TRAINING PROGRAM

## 2017-06-19 PROCEDURE — 40000344 ZZHCL STATISTIC THAWING COMPONENT: Performed by: INTERNAL MEDICINE

## 2017-06-19 PROCEDURE — 85730 THROMBOPLASTIN TIME PARTIAL: CPT | Performed by: INTERNAL MEDICINE

## 2017-06-19 PROCEDURE — 41000019 ZZH PERA-PERFUSION EACH ADDTL 15 MIN: Performed by: SURGERY

## 2017-06-19 PROCEDURE — 25000128 H RX IP 250 OP 636: Performed by: STUDENT IN AN ORGANIZED HEALTH CARE EDUCATION/TRAINING PROGRAM

## 2017-06-19 PROCEDURE — 25000128 H RX IP 250 OP 636: Performed by: NURSE ANESTHETIST, CERTIFIED REGISTERED

## 2017-06-19 PROCEDURE — 85520 HEPARIN ASSAY: CPT | Performed by: INTERNAL MEDICINE

## 2017-06-19 PROCEDURE — 85027 COMPLETE CBC AUTOMATED: CPT | Performed by: INTERNAL MEDICINE

## 2017-06-19 PROCEDURE — P9041 ALBUMIN (HUMAN),5%, 50ML: HCPCS

## 2017-06-19 PROCEDURE — 25000125 ZZHC RX 250: Performed by: NURSE ANESTHETIST, CERTIFIED REGISTERED

## 2017-06-19 PROCEDURE — 84295 ASSAY OF SERUM SODIUM: CPT | Performed by: ANESTHESIOLOGY

## 2017-06-19 PROCEDURE — 00000146 ZZHCL STATISTIC GLUCOSE BY METER IP

## 2017-06-19 PROCEDURE — 20000004 ZZH R&B ICU UMMC

## 2017-06-19 PROCEDURE — 25000128 H RX IP 250 OP 636: Performed by: SURGERY

## 2017-06-19 PROCEDURE — 27410246 ZZH DEVICE HM II POCKET SHOWER BAG, INITIAL ONLY, EACH

## 2017-06-19 PROCEDURE — P9059 PLASMA, FRZ BETWEEN 8-24HOUR: HCPCS | Performed by: INTERNAL MEDICINE

## 2017-06-19 PROCEDURE — 83735 ASSAY OF MAGNESIUM: CPT | Performed by: INTERNAL MEDICINE

## 2017-06-19 PROCEDURE — 25000125 ZZHC RX 250: Performed by: INTERNAL MEDICINE

## 2017-06-19 PROCEDURE — 27410254 ZZH DEVICE HM II UNIVERSAL BATTERY CHARGER, INITIAL ONLY, EACH

## 2017-06-19 PROCEDURE — 27210460 ZZH PUMP APP ADULT PERFUSION: Performed by: SURGERY

## 2017-06-19 PROCEDURE — 82330 ASSAY OF CALCIUM: CPT | Performed by: ANESTHESIOLOGY

## 2017-06-19 PROCEDURE — 86850 RBC ANTIBODY SCREEN: CPT

## 2017-06-19 PROCEDURE — 25000565 ZZH ISOFLURANE, EA 15 MIN: Performed by: SURGERY

## 2017-06-19 PROCEDURE — 40000076 ZZH STATISTIC IABP MONITORING

## 2017-06-19 PROCEDURE — 83605 ASSAY OF LACTIC ACID: CPT | Performed by: ANESTHESIOLOGY

## 2017-06-19 PROCEDURE — 82803 BLOOD GASES ANY COMBINATION: CPT | Performed by: ANESTHESIOLOGY

## 2017-06-19 PROCEDURE — 27410241 ZZH DEVICE HM II 14-V LITHIUM BATTERY, INITIAL ONLY, EACH

## 2017-06-19 PROCEDURE — 25000125 ZZHC RX 250

## 2017-06-19 PROCEDURE — 36000074 ZZH SURGERY LEVEL 6 1ST 30 MIN - UMMC: Performed by: SURGERY

## 2017-06-19 PROCEDURE — 25000128 H RX IP 250 OP 636

## 2017-06-19 PROCEDURE — 27210794 ZZH OR GENERAL SUPPLY STERILE: Performed by: SURGERY

## 2017-06-19 PROCEDURE — 27210447 ZZH PACK CELL SAVER CSP: Performed by: SURGERY

## 2017-06-19 PROCEDURE — 82947 ASSAY GLUCOSE BLOOD QUANT: CPT | Performed by: ANESTHESIOLOGY

## 2017-06-19 PROCEDURE — 82805 BLOOD GASES W/O2 SATURATION: CPT

## 2017-06-19 PROCEDURE — 27410274 ZZH DEVICE HM III POWER UNIT MOBILE W/AC PWR CABLE, INITIAL ONLY, EACH

## 2017-06-19 PROCEDURE — 37000009 ZZH ANESTHESIA TECHNICAL FEE, EACH ADDTL 15 MIN: Performed by: SURGERY

## 2017-06-19 PROCEDURE — 84132 ASSAY OF SERUM POTASSIUM: CPT | Performed by: STUDENT IN AN ORGANIZED HEALTH CARE EDUCATION/TRAINING PROGRAM

## 2017-06-19 PROCEDURE — 27810356 ZZH DEVICE HM II POCKET IMPLANT KIT

## 2017-06-19 PROCEDURE — 84100 ASSAY OF PHOSPHORUS: CPT | Performed by: INTERNAL MEDICINE

## 2017-06-19 PROCEDURE — P9037 PLATE PHERES LEUKOREDU IRRAD: HCPCS | Performed by: INTERNAL MEDICINE

## 2017-06-19 PROCEDURE — 40000014 ZZH STATISTIC ARTERIAL MONITORING DAILY

## 2017-06-19 PROCEDURE — 25000128 H RX IP 250 OP 636: Performed by: ANESTHESIOLOGY

## 2017-06-19 PROCEDURE — 86901 BLOOD TYPING SEROLOGIC RH(D): CPT

## 2017-06-19 PROCEDURE — 5A1221Z PERFORMANCE OF CARDIAC OUTPUT, CONTINUOUS: ICD-10-PCS | Performed by: SURGERY

## 2017-06-19 PROCEDURE — 37000008 ZZH ANESTHESIA TECHNICAL FEE, 1ST 30 MIN: Performed by: SURGERY

## 2017-06-19 PROCEDURE — C1781 MESH (IMPLANTABLE): HCPCS | Performed by: SURGERY

## 2017-06-19 PROCEDURE — 27410245 ZZH DEVICE HM II POCKET BATTERY HOLSTER, INITIAL ONLY

## 2017-06-19 PROCEDURE — 36000076 ZZH SURGERY LEVEL 6 EA 15 ADDTL MIN - UMMC: Performed by: SURGERY

## 2017-06-19 PROCEDURE — 40000196 ZZH STATISTIC RAPCV CVP MONITORING

## 2017-06-19 PROCEDURE — 83735 ASSAY OF MAGNESIUM: CPT | Performed by: STUDENT IN AN ORGANIZED HEALTH CARE EDUCATION/TRAINING PROGRAM

## 2017-06-19 PROCEDURE — 71010 XR CHEST PORT 1 VW: CPT

## 2017-06-19 PROCEDURE — 40000275 ZZH STATISTIC RCP TIME EA 10 MIN

## 2017-06-19 PROCEDURE — C9248 INJ, CLEVIDIPINE BUTYRATE: HCPCS | Performed by: ANESTHESIOLOGY

## 2017-06-19 DEVICE — GRAFT PTFE FELT 6X6" 007837: Type: IMPLANTABLE DEVICE | Site: HEART | Status: FUNCTIONAL

## 2017-06-19 RX ORDER — CALCIUM CHLORIDE 100 MG/ML
INJECTION INTRAVENOUS; INTRAVENTRICULAR PRN
Status: DISCONTINUED | OUTPATIENT
Start: 2017-06-19 | End: 2017-06-20

## 2017-06-19 RX ORDER — FENTANYL CITRATE 50 UG/ML
50 INJECTION, SOLUTION INTRAMUSCULAR; INTRAVENOUS
Status: DISCONTINUED | OUTPATIENT
Start: 2017-06-19 | End: 2017-06-19

## 2017-06-19 RX ORDER — SODIUM CHLORIDE, SODIUM LACTATE, POTASSIUM CHLORIDE, CALCIUM CHLORIDE 600; 310; 30; 20 MG/100ML; MG/100ML; MG/100ML; MG/100ML
INJECTION, SOLUTION INTRAVENOUS CONTINUOUS PRN
Status: DISCONTINUED | OUTPATIENT
Start: 2017-06-19 | End: 2017-06-20

## 2017-06-19 RX ORDER — FENTANYL CITRATE 50 UG/ML
100 INJECTION, SOLUTION INTRAMUSCULAR; INTRAVENOUS
Status: DISCONTINUED | OUTPATIENT
Start: 2017-06-19 | End: 2017-06-20

## 2017-06-19 RX ORDER — PROTAMINE SULFATE 10 MG/ML
INJECTION, SOLUTION INTRAVENOUS PRN
Status: DISCONTINUED | OUTPATIENT
Start: 2017-06-19 | End: 2017-06-20

## 2017-06-19 RX ORDER — FENTANYL CITRATE 50 UG/ML
INJECTION, SOLUTION INTRAMUSCULAR; INTRAVENOUS PRN
Status: DISCONTINUED | OUTPATIENT
Start: 2017-06-19 | End: 2017-06-20

## 2017-06-19 RX ORDER — HEPARIN SODIUM 5000 [USP'U]/.5ML
INJECTION, SOLUTION INTRAVENOUS; SUBCUTANEOUS PRN
Status: DISCONTINUED | OUTPATIENT
Start: 2017-06-19 | End: 2017-06-19

## 2017-06-19 RX ORDER — HEPARIN SODIUM 1000 [USP'U]/ML
INJECTION, SOLUTION INTRAVENOUS; SUBCUTANEOUS PRN
Status: DISCONTINUED | OUTPATIENT
Start: 2017-06-19 | End: 2017-06-20

## 2017-06-19 RX ORDER — SODIUM CHLORIDE 9 MG/ML
INJECTION, SOLUTION INTRAVENOUS CONTINUOUS PRN
Status: DISCONTINUED | OUTPATIENT
Start: 2017-06-19 | End: 2017-06-20

## 2017-06-19 RX ORDER — NOREPINEPHRINE BITARTRATE 1 MG/ML
INJECTION, SOLUTION INTRAVENOUS PRN
Status: DISCONTINUED | OUTPATIENT
Start: 2017-06-19 | End: 2017-06-20

## 2017-06-19 RX ORDER — PROPOFOL 10 MG/ML
INJECTION, EMULSION INTRAVENOUS PRN
Status: DISCONTINUED | OUTPATIENT
Start: 2017-06-19 | End: 2017-06-20

## 2017-06-19 RX ORDER — NITROGLYCERIN 10 MG/100ML
INJECTION INTRAVENOUS PRN
Status: DISCONTINUED | OUTPATIENT
Start: 2017-06-19 | End: 2017-06-20

## 2017-06-19 RX ORDER — LIDOCAINE HYDROCHLORIDE 20 MG/ML
INJECTION, SOLUTION INFILTRATION; PERINEURAL PRN
Status: DISCONTINUED | OUTPATIENT
Start: 2017-06-19 | End: 2017-06-20

## 2017-06-19 RX ADMIN — OXYCODONE HYDROCHLORIDE 5 MG: 5 SOLUTION ORAL at 01:05

## 2017-06-19 RX ADMIN — OXYCODONE HYDROCHLORIDE 5 MG: 5 SOLUTION ORAL at 03:53

## 2017-06-19 RX ADMIN — FENTANYL CITRATE 250 MCG: 50 INJECTION, SOLUTION INTRAMUSCULAR; INTRAVENOUS at 21:14

## 2017-06-19 RX ADMIN — PROTAMINE SULFATE 300 MG: 10 INJECTION, SOLUTION INTRAVENOUS at 22:56

## 2017-06-19 RX ADMIN — ROCURONIUM BROMIDE 30 MG: 10 INJECTION INTRAVENOUS at 23:10

## 2017-06-19 RX ADMIN — Medication: at 08:00

## 2017-06-19 RX ADMIN — Medication 40000 UNITS: at 21:25

## 2017-06-19 RX ADMIN — SODIUM CHLORIDE: 9 INJECTION, SOLUTION INTRAVENOUS at 20:05

## 2017-06-19 RX ADMIN — NOREPINEPHRINE BITARTRATE 0.05 MCG/KG/MIN: 1 INJECTION INTRAVENOUS at 22:40

## 2017-06-19 RX ADMIN — LIDOCAINE HYDROCHLORIDE 100 MG: 20 INJECTION, SOLUTION INFILTRATION; PERINEURAL at 20:09

## 2017-06-19 RX ADMIN — FENTANYL CITRATE 250 MCG: 50 INJECTION, SOLUTION INTRAMUSCULAR; INTRAVENOUS at 21:17

## 2017-06-19 RX ADMIN — MIDAZOLAM HYDROCHLORIDE 2 MG: 1 INJECTION, SOLUTION INTRAMUSCULAR; INTRAVENOUS at 23:10

## 2017-06-19 RX ADMIN — FENTANYL CITRATE 50 MCG: 50 INJECTION, SOLUTION INTRAMUSCULAR; INTRAVENOUS at 10:25

## 2017-06-19 RX ADMIN — FENTANYL CITRATE 250 MCG: 50 INJECTION, SOLUTION INTRAMUSCULAR; INTRAVENOUS at 20:09

## 2017-06-19 RX ADMIN — IPRATROPIUM BROMIDE AND ALBUTEROL SULFATE 3 ML: .5; 3 SOLUTION RESPIRATORY (INHALATION) at 16:01

## 2017-06-19 RX ADMIN — PROPOFOL 60 MG: 10 INJECTION, EMULSION INTRAVENOUS at 20:09

## 2017-06-19 RX ADMIN — HEPARIN SODIUM 1200 UNITS/HR: 10000 INJECTION, SOLUTION INTRAVENOUS at 04:49

## 2017-06-19 RX ADMIN — IPRATROPIUM BROMIDE AND ALBUTEROL SULFATE 3 ML: .5; 3 SOLUTION RESPIRATORY (INHALATION) at 08:42

## 2017-06-19 RX ADMIN — FLUCONAZOLE 200 MG: 2 INJECTION INTRAVENOUS at 20:45

## 2017-06-19 RX ADMIN — NOREPINEPHRINE BITARTRATE 6.4 MCG: 1 INJECTION INTRAVENOUS at 22:42

## 2017-06-19 RX ADMIN — CALCIUM CHLORIDE 1 G: 100 INJECTION, SOLUTION INTRAVENOUS at 22:46

## 2017-06-19 RX ADMIN — FENTANYL CITRATE 100 MCG: 50 INJECTION, SOLUTION INTRAMUSCULAR; INTRAVENOUS at 18:18

## 2017-06-19 RX ADMIN — ROCURONIUM BROMIDE 50 MG: 10 INJECTION INTRAVENOUS at 21:31

## 2017-06-19 RX ADMIN — IPRATROPIUM BROMIDE AND ALBUTEROL SULFATE 3 ML: .5; 3 SOLUTION RESPIRATORY (INHALATION) at 12:24

## 2017-06-19 RX ADMIN — PROPOFOL 30 MG: 10 INJECTION, EMULSION INTRAVENOUS at 20:14

## 2017-06-19 RX ADMIN — FENTANYL CITRATE 100 MCG: 50 INJECTION, SOLUTION INTRAMUSCULAR; INTRAVENOUS at 20:56

## 2017-06-19 RX ADMIN — FUROSEMIDE 40 MG: 10 INJECTION, SOLUTION INTRAVENOUS at 09:09

## 2017-06-19 RX ADMIN — FENTANYL CITRATE 50 MCG: 50 INJECTION, SOLUTION INTRAMUSCULAR; INTRAVENOUS at 12:35

## 2017-06-19 RX ADMIN — POTASSIUM CHLORIDE 20 MEQ: 29.8 INJECTION, SOLUTION INTRAVENOUS at 04:46

## 2017-06-19 RX ADMIN — ROCURONIUM BROMIDE 100 MG: 10 INJECTION INTRAVENOUS at 20:09

## 2017-06-19 RX ADMIN — NITROGLYCERIN 100 MCG: 10 INJECTION INTRAVENOUS at 21:28

## 2017-06-19 RX ADMIN — SODIUM BICARBONATE 1 MEQ: 84 INJECTION INTRAVENOUS at 23:29

## 2017-06-19 RX ADMIN — CLEVIDIPINE 0.25 MG: 0.5 EMULSION INTRAVENOUS at 21:34

## 2017-06-19 RX ADMIN — NITROGLYCERIN 100 MCG: 10 INJECTION INTRAVENOUS at 21:14

## 2017-06-19 RX ADMIN — NITROGLYCERIN 100 MCG: 10 INJECTION INTRAVENOUS at 21:31

## 2017-06-19 RX ADMIN — FENTANYL CITRATE 100 MCG: 50 INJECTION, SOLUTION INTRAMUSCULAR; INTRAVENOUS at 16:42

## 2017-06-19 RX ADMIN — FENTANYL CITRATE 50 MCG: 50 INJECTION, SOLUTION INTRAMUSCULAR; INTRAVENOUS at 08:24

## 2017-06-19 RX ADMIN — SODIUM CHLORIDE, POTASSIUM CHLORIDE, SODIUM LACTATE AND CALCIUM CHLORIDE: 600; 310; 30; 20 INJECTION, SOLUTION INTRAVENOUS at 19:48

## 2017-06-19 RX ADMIN — CLEVIDIPINE 0.25 MG: 0.5 EMULSION INTRAVENOUS at 21:35

## 2017-06-19 RX ADMIN — LEVOFLOXACIN 500 MG: 5 INJECTION, SOLUTION INTRAVENOUS at 20:46

## 2017-06-19 RX ADMIN — ACETAMINOPHEN 650 MG: 325 TABLET, FILM COATED ORAL at 07:05

## 2017-06-19 RX ADMIN — OXYCODONE HYDROCHLORIDE 5 MG: 5 SOLUTION ORAL at 07:05

## 2017-06-19 RX ADMIN — ROCURONIUM BROMIDE 50 MG: 10 INJECTION INTRAVENOUS at 20:51

## 2017-06-19 RX ADMIN — ACETAMINOPHEN 650 MG: 325 TABLET, FILM COATED ORAL at 01:05

## 2017-06-19 RX ADMIN — SODIUM CHLORIDE: 9 INJECTION, SOLUTION INTRAVENOUS at 20:45

## 2017-06-19 RX ADMIN — FENTANYL CITRATE 50 MCG: 50 INJECTION, SOLUTION INTRAMUSCULAR; INTRAVENOUS at 12:52

## 2017-06-19 RX ADMIN — MUPIROCIN 1 G: 20 OINTMENT TOPICAL at 09:05

## 2017-06-19 RX ADMIN — SODIUM CHLORIDE: 9 INJECTION, SOLUTION INTRAVENOUS at 20:22

## 2017-06-19 RX ADMIN — MIDAZOLAM HYDROCHLORIDE 2 MG: 1 INJECTION, SOLUTION INTRAMUSCULAR; INTRAVENOUS at 20:09

## 2017-06-19 RX ADMIN — MIDAZOLAM HYDROCHLORIDE 1 MG: 1 INJECTION, SOLUTION INTRAMUSCULAR; INTRAVENOUS at 20:28

## 2017-06-19 RX ADMIN — Medication: at 14:19

## 2017-06-19 RX ADMIN — AMINOCAPROIC ACID 1 G/HR: 250 INJECTION, SOLUTION INTRAVENOUS at 22:10

## 2017-06-19 RX ADMIN — VASOPRESSIN 1 UNITS: 20 INJECTION, SOLUTION INTRAMUSCULAR; SUBCUTANEOUS at 22:41

## 2017-06-19 RX ADMIN — NITROGLYCERIN 100 MCG: 10 INJECTION INTRAVENOUS at 21:18

## 2017-06-19 RX ADMIN — ACETAMINOPHEN 650 MG: 325 TABLET, FILM COATED ORAL at 03:53

## 2017-06-19 RX ADMIN — AMINOCAPROIC ACID 5 G/HR: 250 INJECTION, SOLUTION INTRAVENOUS at 21:11

## 2017-06-19 RX ADMIN — EPINEPHRINE 0.05 MCG/KG/MIN: 1 INJECTION PARENTERAL at 22:33

## 2017-06-19 RX ADMIN — NOREPINEPHRINE BITARTRATE 6.4 MCG: 1 INJECTION INTRAVENOUS at 22:39

## 2017-06-19 RX ADMIN — FENTANYL CITRATE 100 MCG: 50 INJECTION, SOLUTION INTRAMUSCULAR; INTRAVENOUS at 14:19

## 2017-06-19 RX ADMIN — ROCURONIUM BROMIDE 20 MG: 10 INJECTION INTRAVENOUS at 23:48

## 2017-06-19 RX ADMIN — CLEVIDIPINE 0.25 MG: 0.5 EMULSION INTRAVENOUS at 21:32

## 2017-06-19 RX ADMIN — FUROSEMIDE 40 MG: 10 INJECTION, SOLUTION INTRAVENOUS at 14:19

## 2017-06-19 RX ADMIN — SODIUM CHLORIDE 600 MG: 9 INJECTION, SOLUTION INTRAVENOUS at 20:50

## 2017-06-19 RX ADMIN — VANCOMYCIN HYDROCHLORIDE 1.5 G: 10 INJECTION, POWDER, LYOPHILIZED, FOR SOLUTION INTRAVENOUS at 20:46

## 2017-06-19 RX ADMIN — ALBUTEROL SULFATE 6 PUFF: 90 AEROSOL, METERED RESPIRATORY (INHALATION) at 22:46

## 2017-06-19 RX ADMIN — EPINEPHRINE 20 MCG: 1 INJECTION, SOLUTION INTRAMUSCULAR; SUBCUTANEOUS at 22:41

## 2017-06-19 RX ADMIN — FENTANYL CITRATE 150 MCG: 50 INJECTION, SOLUTION INTRAMUSCULAR; INTRAVENOUS at 21:12

## 2017-06-19 RX ADMIN — VASOPRESSIN 2 UNITS/HR: 20 INJECTION, SOLUTION INTRAMUSCULAR; SUBCUTANEOUS at 22:52

## 2017-06-19 ASSESSMENT — NEW YORK HEART ASSOCIATION (NYHA) CLASSIFICATION: NYHA FUNCTIONAL CLASS: III

## 2017-06-19 ASSESSMENT — ENCOUNTER SYMPTOMS: DYSRHYTHMIAS: 1

## 2017-06-19 ASSESSMENT — PAIN DESCRIPTION - DESCRIPTORS
DESCRIPTORS: ACHING

## 2017-06-19 ASSESSMENT — COPD QUESTIONNAIRES: COPD: 1

## 2017-06-19 NOTE — PROGRESS NOTES
Social Work Services Progress Note    Hospital Day 4  Collaborated with:  Jim and Cate    Data:  Jim is scheduled for lvad implant today. Surgery has been delayed due to another patient's surgery.    Intervention:  Met with Jim and Cate for supportive visit.     Assessment:  Jim states that he's feeling better right now as he's had some pain medication that is working better. He is ready to be done with surgery, but is also understanding of the reason for delay. Cate states that she feels that she's doing ok. Both deny questions at this time.    Plan:    Anticipated Disposition:  Unclear, pending recovery from surgery.    Barriers to d/c plan:  New lvad implant    Follow Up:  SW will continue to follow for support as needed.    Pager [phone]

## 2017-06-19 NOTE — PLAN OF CARE
Problem: Cardiac: Heart Failure (Adult)  Goal: Signs and Symptoms of Listed Potential Problems Will be Absent or Manageable (Cardiac: Heart Failure)  Signs and symptoms of listed potential problems will be absent or manageable by discharge/transition of care (reference Cardiac: Heart Failure (Adult) CPG).   Outcome: No Change  Awaiting HM2 LVAD placement today with Dr. Quigley. IABP 1:1 augmented means 80s. CVP 12 - UOP trending down this AM, but 40mg lasix scheduled IV this AM at 0800. CI 2.8, CO 6.4, . Patient has a Medtronic biventricular pacer ICD. Hep gtt is at 1200 units/h. PA catheter is at 59cm at hub. Patient pre-op scrub completed. Quite uncomfortable lying on back and likely needs increase in his PRN analgesia. Wife here this morning and updated on POC.

## 2017-06-19 NOTE — PROGRESS NOTES
SPIRITUAL HEALTH SERVICES  SPIRITUAL ASSESSMENT Progress Note  [facility] ([place]) 4E     REFERRAL SOURCE: brief visit with pt/family to assess needs.    Oriented pt/family to Spiritual Health Services. Pt declined  support, will request it if desired.    PLAN: no follow-up indicated at this time.    Parviz Smith M.Div (Bill)., [facility]  Staff   Pager [phone]

## 2017-06-19 NOTE — PROGRESS NOTES
Cardiology Progress Note    Events and interval changes in past 24 hours:   No new issues. Has back pain likely positional    OBJECTIVE FINDINGS:  Temp: 98.8  F (37.1  C) Temp  Min: 98.2  F (36.8  C)  Max: 99.3  F (37.4  C)  Resp: 16 Resp  Min: 16  Max: 20  SpO2: 95 % SpO2  Min: 95 %  Max: 99 %    No Data Recorded  Heart Rate: 74 Heart Rate  Min: 74  Max: 96  BP: 95/59 Systolic (24hrs), Av , Min:80 , Max:115   Diastolic (24hrs), Av, Min:50, Max:78  cvp 12 pa 44/28/33 pa sat 66% ci 2.8 co 7.2 svr 800 hgb 10.1 bsa 2.3    IABP aug diast 100 MAP 70-80      Gen: Patient is AOx3, in NAD. Appears comfortable.    Resp: clear to auscultation bilaterally, no crackles or wheezing   CV: RRR, normal S1/S2, no S3/S4,holosystolic murmur at ape, no peripheral edema  Abd:soft, nontender,   IABP    Intake/Output Summary (Last 24 hours) at 17 0609  Last data filed at 17 0500   Gross per 24 hour   Intake           1206.2 ml   Output             1950 ml   Net           -743.8 ml     Vitals:    06/15/17 1141 17 1125 17 0400 17 0607   Weight: 114.8 kg (253 lb) 112.4 kg (247 lb 11.2 oz) 113.1 kg (249 lb 4.8 oz) 112.5 kg (248 lb 1.6 oz)    17 0500   Weight: 112.1 kg (247 lb 2.2 oz)       HEParin 1,200 Units/hr (17 0500)       mupirocin  1 g Both Nostrils BID     vancomycin (VANCOCIN) IV  1,500 mg Intravenous Pre-Op/Pre-procedure x 1 dose     vancomycin (VANCOCIN) IV  1,500 mg Intravenous See Admin Instructions     levofloxacin  500 mg Intravenous Pre-Op/Pre-procedure x 1 dose     levofloxacin  500 mg Intravenous See Admin Instructions     rifampin  600 mg Intravenous Pre-Op/Pre-procedure x 1 dose     fluconazole  200 mg Intravenous Pre-Op/Pre-procedure x 1 dose     lidocaine  1 patch Transdermal Q24h    And     lidocaine   Transdermal Q24H    And     lidocaine   Transdermal Q8H     ketamine (KETALAR) 5%-gabapentin (NEURONTIN) 8%-lidocaine 2.5%   Topical TID     umeclidinium  1 puff  Inhalation Daily     fluticasone  1 spray Both Nostrils Daily     sodium chloride  2 spray Both Nostrils BID     ipratropium - albuterol 0.5 mg/2.5 mg/3 mL  3 mL Nebulization 4x Daily     sodium chloride (PF)  3 mL Intracatheter Q8H     senna-docusate  1-2 tablet Oral BID     citalopram  20 mg Oral Daily     [START ON 7/1/2017] cyanocobalamin  1,000 mcg Intramuscular Q30 Days     fluticasone-vilanterol  1 puff Inhalation Daily     montelukast  10 mg Oral At Bedtime     zinc sulfate  220 mg Oral BID     docusate sodium  100 mg Oral BID     sennosides  1 tablet Oral Daily     allopurinol  50 mg Oral Daily     colchicine  0.6 mg Oral Daily     hydrALAZINE  10 mg Oral TID     pneumococcal vaccine  0.5 mL Intramuscular Prior to discharge     insulin aspart  1-7 Units Subcutaneous TID AC     insulin aspart  1-5 Units Subcutaneous At Bedtime     furosemide  40 mg Intravenous TID   heparin, HOLD MEDICATION, oxyCODONE, magnesium sulfate, magnesium sulfate, potassium chloride, potassium chloride, potassium chloride, potassium chloride with lidocaine, potassium chloride, traZODone **OR** traZODone, naloxone, lidocaine, lidocaine 4%, sodium chloride (PF), acetaminophen, senna-docusate, polyethylene glycol, bisacodyl, ondansetron **OR** ondansetron, albuterol, diclofenac, glucose **OR** dextrose **OR** glucagon  CMP  Recent Labs  Lab 06/19/17  0340 06/18/17  1855 06/18/17  0621 06/17/17  1612 06/17/17  0658 06/16/17  0621 06/15/17  1155 06/13/17  0450     --  138  --  136 138 139 136   POTASSIUM 4.0 4.0 3.7 3.8 3.3* 3.8 4.0 4.2   CHLORIDE 105  --  105  --  102 104 103 106   CO2 26  --  27  --  27 29 29 24   ANIONGAP 6  --  6  --  6 5 7 6   GLC 92  --  111*  --  113* 111* 109* 97   BUN 24  --  29  --  31* 29 33* 35*   CR 1.49*  --  1.42*  --  1.47* 1.48* 1.41* 1.33*   GFRESTIMATED 48*  --  51*  --  49* 48* 51* 55*   GFRESTBLACK 58*  --  62  --  59* 59* 62 66   QAMAR 8.8  --  8.8  --  8.5 9.1 9.0 8.6   MAG 2.3 1.9  --   --    --   --   --  2.1   PHOS 3.0  --   --   --   --   --   --   --    PROTTOTAL  --   --   --   --   --   --  7.3  --    ALBUMIN  --   --   --   --   --   --  3.8  --    BILITOTAL  --   --   --   --   --   --  0.8  --    ALKPHOS  --   --   --   --   --   --  72  --    AST  --   --   --   --   --   --  23  --    ALT  --   --   --   --   --   --  26  --      CBC  Recent Labs  Lab 06/19/17  0340 06/18/17  0621 06/17/17  0658 06/16/17  0621   WBC 6.4 5.4 6.0 7.5   RBC 3.46* 3.60* 3.51* 3.63*   HGB 10.1* 10.6* 10.4* 10.6*   HCT 31.6* 32.8* 31.9* 33.6*   MCV 91 91 91 93   MCH 29.2 29.4 29.6 29.2   MCHC 32.0 32.3 32.6 31.5   RDW 13.3 13.3 13.5 13.7    221 216 208     INR  Recent Labs  Lab 06/19/17  0340 06/18/17  0621 06/17/17  0658 06/16/17  0621   INR 1.26* 1.25* 1.26* 1.51*     Arterial Blood Gas  Recent Labs  Lab 06/19/17  0340 06/18/17  2315 06/18/17  1124 06/15/17  1606   PH  --   --   --  7.45   PCO2  --   --   --  41   PO2  --   --   --  99   HCO3  --   --   --  28   O2PER 21 21 21 25         Imaging and other studies:  EKG: Vpaced rhythm  Echocardiogram: before LVAD 6/12  Severe left ventricular dilation is present. LVIDd 8.0 cm.  Severe diffuse hypokinesis is present. Posterior wall akinesis is present.  The Ejection Fraction is estimated at 20-25%.Traced Biplane LVEF is 23%.  Moderate-Severe functional Mitral Regurgitation. There is tenting of the  mitral leaflets with restriction of the posterior mitral leaflet. ERO is 0.3  cm^2. RV 43 ml.  Mild right ventricular dilation is present. Global right ventricular function  is normal.  Dilation of the inferior vena cava is present with normal respiratory  variation in diameter.    Chest x-ray: lungs clear    ASSESSMENT:    62M h/o NICM (EF 20%) s/p Bi-V ICD, severe MR, VT, paroxsymal afib, DM, HTN, CKD, CECILIA, MDD, admitted for gradual shortness of breath, likely 2/2 to inadequate maintenance diuretics. Improving with BiPAP.       # NICM s/p CRT-D  # Severe  functional MR    # Gout-left elbow    # VT  # pAF  #HTN  #CKD-basline Cr 1.4  #CECILIA    PLAN:  -LVAD today      Will staffed with Dr. Ramiro Gómez  General cards fellow, PGY4  Pager [phone]    Attending Attestation:  Patient seen and examined by me with the team. I have performed all pertinent elements of the physical examination and reviewed the note above. I have reviewed pertinent laboratory, echocardiographic, imaging, and cardiac catheterization results. I agree with the plan of care as described in this note.    Alberto Rubio MD, PhD

## 2017-06-19 NOTE — PLAN OF CARE
Problem: Goal Outcome Summary  Goal: Goal Outcome Summary  Outcome: No Change  IABP 1:1, augmented diastolic 90's. Groin site soft, scant amount of sanguinous drainage. C/o back pain, provider notified of inadequate pain relief, fentanyl dose increased. Pt states that pain is now tolerable. Scheduled lasix given, adequate UOP. Resting between cares. NPO. Pre-op checklist complete. Plan for OR today for LVAD placement.

## 2017-06-20 ENCOUNTER — APPOINTMENT (OUTPATIENT)
Dept: GENERAL RADIOLOGY | Facility: CLINIC | Age: 62
DRG: 001 | End: 2017-06-20
Attending: INTERNAL MEDICINE
Payer: COMMERCIAL

## 2017-06-20 ENCOUNTER — ALLIED HEALTH/NURSE VISIT (OUTPATIENT)
Dept: CARDIOLOGY | Facility: CLINIC | Age: 62
DRG: 001 | End: 2017-06-20
Attending: INTERNAL MEDICINE
Payer: COMMERCIAL

## 2017-06-20 DIAGNOSIS — I42.8 NICM (NONISCHEMIC CARDIOMYOPATHY) (H): Primary | ICD-10-CM

## 2017-06-20 LAB
ABO + RH BLD: NORMAL
ABO + RH BLD: NORMAL
ANION GAP SERPL CALCULATED.3IONS-SCNC: 10 MMOL/L (ref 3–14)
ANION GAP SERPL CALCULATED.3IONS-SCNC: 11 MMOL/L (ref 3–14)
ANION GAP SERPL CALCULATED.3IONS-SCNC: 7 MMOL/L (ref 3–14)
APTT PPP: 32 SEC (ref 22–37)
BASE DEFICIT BLDA-SCNC: 0.9 MMOL/L
BASE DEFICIT BLDA-SCNC: 1 MMOL/L
BASE DEFICIT BLDA-SCNC: 1.1 MMOL/L
BASE DEFICIT BLDA-SCNC: 1.3 MMOL/L
BASE DEFICIT BLDA-SCNC: 2.3 MMOL/L
BASE DEFICIT BLDA-SCNC: 3.3 MMOL/L
BASE DEFICIT BLDA-SCNC: 3.9 MMOL/L
BASE DEFICIT BLDV-SCNC: 0.1 MMOL/L
BASE DEFICIT BLDV-SCNC: 0.5 MMOL/L
BASE DEFICIT BLDV-SCNC: 2.1 MMOL/L
BASE EXCESS BLDA CALC-SCNC: 0.6 MMOL/L
BASE EXCESS BLDA CALC-SCNC: 0.6 MMOL/L
BASE EXCESS BLDA CALC-SCNC: 0.8 MMOL/L
BASE EXCESS BLDV CALC-SCNC: 0.6 MMOL/L
BASE EXCESS BLDV CALC-SCNC: 1.9 MMOL/L
BLD GP AB SCN SERPL QL: NORMAL
BLD PROD TYP BPU: NORMAL
BLD UNIT ID BPU: 0
BLOOD BANK CMNT PATIENT-IMP: NORMAL
BLOOD PRODUCT CODE: NORMAL
BPU ID: NORMAL
BUN SERPL-MCNC: 25 MG/DL (ref 7–30)
BUN SERPL-MCNC: 26 MG/DL (ref 7–30)
BUN SERPL-MCNC: 28 MG/DL (ref 7–30)
CA-I BLD-MCNC: 4.4 MG/DL (ref 4.4–5.2)
CA-I BLD-MCNC: 4.4 MG/DL (ref 4.4–5.2)
CA-I BLD-MCNC: 4.5 MG/DL (ref 4.4–5.2)
CA-I BLD-MCNC: 4.5 MG/DL (ref 4.4–5.2)
CA-I BLD-MCNC: 4.7 MG/DL (ref 4.4–5.2)
CA-I BLD-MCNC: 4.7 MG/DL (ref 4.4–5.2)
CA-I BLD-MCNC: 4.8 MG/DL (ref 4.4–5.2)
CA-I BLD-MCNC: 5.7 MG/DL (ref 4.4–5.2)
CALCIUM SERPL-MCNC: 8.7 MG/DL (ref 8.5–10.1)
CALCIUM SERPL-MCNC: 8.7 MG/DL (ref 8.5–10.1)
CALCIUM SERPL-MCNC: 9.2 MG/DL (ref 8.5–10.1)
CHLORIDE SERPL-SCNC: 104 MMOL/L (ref 94–109)
CHLORIDE SERPL-SCNC: 106 MMOL/L (ref 94–109)
CHLORIDE SERPL-SCNC: 107 MMOL/L (ref 94–109)
CO2 SERPL-SCNC: 24 MMOL/L (ref 20–32)
CO2 SERPL-SCNC: 24 MMOL/L (ref 20–32)
CO2 SERPL-SCNC: 26 MMOL/L (ref 20–32)
CREAT SERPL-MCNC: 1.49 MG/DL (ref 0.66–1.25)
CREAT SERPL-MCNC: 1.57 MG/DL (ref 0.66–1.25)
CREAT SERPL-MCNC: 1.76 MG/DL (ref 0.66–1.25)
ERYTHROCYTE [DISTWIDTH] IN BLOOD BY AUTOMATED COUNT: 13.4 % (ref 10–15)
ERYTHROCYTE [DISTWIDTH] IN BLOOD BY AUTOMATED COUNT: 13.5 % (ref 10–15)
ERYTHROCYTE [DISTWIDTH] IN BLOOD BY AUTOMATED COUNT: 13.9 % (ref 10–15)
FIBRINOGEN PPP-MCNC: 401 MG/DL (ref 200–420)
GFR SERPL CREATININE-BSD FRML MDRD: 40 ML/MIN/1.7M2
GFR SERPL CREATININE-BSD FRML MDRD: 45 ML/MIN/1.7M2
GFR SERPL CREATININE-BSD FRML MDRD: 48 ML/MIN/1.7M2
GLUCOSE BLD-MCNC: 102 MG/DL (ref 70–99)
GLUCOSE BLD-MCNC: 117 MG/DL (ref 70–99)
GLUCOSE BLD-MCNC: 117 MG/DL (ref 70–99)
GLUCOSE BLD-MCNC: 138 MG/DL (ref 70–99)
GLUCOSE BLD-MCNC: 219 MG/DL (ref 70–99)
GLUCOSE BLD-MCNC: 221 MG/DL (ref 70–99)
GLUCOSE BLDC GLUCOMTR-MCNC: 119 MG/DL (ref 70–99)
GLUCOSE BLDC GLUCOMTR-MCNC: 120 MG/DL (ref 70–99)
GLUCOSE BLDC GLUCOMTR-MCNC: 127 MG/DL (ref 70–99)
GLUCOSE BLDC GLUCOMTR-MCNC: 128 MG/DL (ref 70–99)
GLUCOSE BLDC GLUCOMTR-MCNC: 129 MG/DL (ref 70–99)
GLUCOSE BLDC GLUCOMTR-MCNC: 143 MG/DL (ref 70–99)
GLUCOSE BLDC GLUCOMTR-MCNC: 145 MG/DL (ref 70–99)
GLUCOSE BLDC GLUCOMTR-MCNC: 166 MG/DL (ref 70–99)
GLUCOSE BLDC GLUCOMTR-MCNC: 177 MG/DL (ref 70–99)
GLUCOSE BLDC GLUCOMTR-MCNC: 183 MG/DL (ref 70–99)
GLUCOSE BLDC GLUCOMTR-MCNC: 185 MG/DL (ref 70–99)
GLUCOSE BLDC GLUCOMTR-MCNC: 187 MG/DL (ref 70–99)
GLUCOSE SERPL-MCNC: 124 MG/DL (ref 70–99)
GLUCOSE SERPL-MCNC: 186 MG/DL (ref 70–99)
GLUCOSE SERPL-MCNC: 186 MG/DL (ref 70–99)
HCO3 BLD-SCNC: 22 MMOL/L (ref 21–28)
HCO3 BLD-SCNC: 22 MMOL/L (ref 21–28)
HCO3 BLD-SCNC: 23 MMOL/L (ref 21–28)
HCO3 BLD-SCNC: 24 MMOL/L (ref 21–28)
HCO3 BLD-SCNC: 25 MMOL/L (ref 21–28)
HCO3 BLDV-SCNC: 25 MMOL/L (ref 21–28)
HCO3 BLDV-SCNC: 27 MMOL/L (ref 21–28)
HCT VFR BLD AUTO: 25.5 % (ref 40–53)
HCT VFR BLD AUTO: 25.5 % (ref 40–53)
HCT VFR BLD AUTO: 27.5 % (ref 40–53)
HCT VFR BLD AUTO: 31.8 % (ref 40–53)
HGB BLD-MCNC: 10.1 G/DL (ref 13.3–17.7)
HGB BLD-MCNC: 10.2 G/DL (ref 13.3–17.7)
HGB BLD-MCNC: 8.1 G/DL (ref 13.3–17.7)
HGB BLD-MCNC: 8.3 G/DL (ref 13.3–17.7)
HGB BLD-MCNC: 8.4 G/DL (ref 13.3–17.7)
HGB BLD-MCNC: 8.6 G/DL (ref 13.3–17.7)
HGB BLD-MCNC: 8.6 G/DL (ref 13.3–17.7)
HGB BLD-MCNC: 8.7 G/DL (ref 13.3–17.7)
HGB BLD-MCNC: 8.9 G/DL (ref 13.3–17.7)
HGB BLD-MCNC: 9.1 G/DL (ref 13.3–17.7)
HGB BLD-MCNC: 9.1 G/DL (ref 13.3–17.7)
INR PPP: 1.42 (ref 0.86–1.14)
INR PPP: 1.44 (ref 0.86–1.14)
INR PPP: 1.49 (ref 0.86–1.14)
INTERPRETATION ECG - MUSE: NORMAL
LACTATE BLD-SCNC: 0.6 MMOL/L (ref 0.7–2.1)
LACTATE BLD-SCNC: 0.7 MMOL/L (ref 0.7–2.1)
LACTATE BLD-SCNC: 0.9 MMOL/L (ref 0.7–2.1)
LACTATE BLD-SCNC: 1.3 MMOL/L (ref 0.7–2.1)
LACTATE BLD-SCNC: 1.4 MMOL/L (ref 0.7–2.1)
MAGNESIUM SERPL-MCNC: 2 MG/DL (ref 1.6–2.3)
MAGNESIUM SERPL-MCNC: 2.1 MG/DL (ref 1.6–2.3)
MAGNESIUM SERPL-MCNC: 2.3 MG/DL (ref 1.6–2.3)
MCH RBC QN AUTO: 28.9 PG (ref 26.5–33)
MCH RBC QN AUTO: 29 PG (ref 26.5–33)
MCH RBC QN AUTO: 29.6 PG (ref 26.5–33)
MCHC RBC AUTO-ENTMCNC: 31.6 G/DL (ref 31.5–36.5)
MCHC RBC AUTO-ENTMCNC: 31.8 G/DL (ref 31.5–36.5)
MCHC RBC AUTO-ENTMCNC: 32.1 G/DL (ref 31.5–36.5)
MCV RBC AUTO: 91 FL (ref 78–100)
MCV RBC AUTO: 92 FL (ref 78–100)
MCV RBC AUTO: 92 FL (ref 78–100)
NUM BPU REQUESTED: 1
O2/TOTAL GAS SETTING VFR VENT: 100 %
O2/TOTAL GAS SETTING VFR VENT: 40 %
O2/TOTAL GAS SETTING VFR VENT: 60 %
O2/TOTAL GAS SETTING VFR VENT: 60 %
O2/TOTAL GAS SETTING VFR VENT: 80 %
O2/TOTAL GAS SETTING VFR VENT: ABNORMAL %
O2/TOTAL GAS SETTING VFR VENT: NORMAL %
OXYHGB MFR BLD: 96 % (ref 92–100)
OXYHGB MFR BLD: 97 % (ref 92–100)
OXYHGB MFR BLD: 99 % (ref 92–100)
OXYHGB MFR BLDV: 59 %
OXYHGB MFR BLDV: 62 %
OXYHGB MFR BLDV: 63 %
OXYHGB MFR BLDV: 73 %
PCO2 BLD: 34 MM HG (ref 35–45)
PCO2 BLD: 36 MM HG (ref 35–45)
PCO2 BLD: 37 MM HG (ref 35–45)
PCO2 BLD: 38 MM HG (ref 35–45)
PCO2 BLD: 41 MM HG (ref 35–45)
PCO2 BLD: 43 MM HG (ref 35–45)
PCO2 BLD: 43 MM HG (ref 35–45)
PCO2 BLD: 46 MM HG (ref 35–45)
PCO2 BLD: 47 MM HG (ref 35–45)
PCO2 BLD: 48 MM HG (ref 35–45)
PCO2 BLDV: 41 MM HG (ref 40–50)
PCO2 BLDV: 43 MM HG (ref 40–50)
PCO2 BLDV: 45 MM HG (ref 40–50)
PCO2 BLDV: 47 MM HG (ref 40–50)
PCO2 BLDV: 52 MM HG (ref 40–50)
PH BLD: 7.3 PH (ref 7.35–7.45)
PH BLD: 7.31 PH (ref 7.35–7.45)
PH BLD: 7.32 PH (ref 7.35–7.45)
PH BLD: 7.33 PH (ref 7.35–7.45)
PH BLD: 7.36 PH (ref 7.35–7.45)
PH BLD: 7.38 PH (ref 7.35–7.45)
PH BLD: 7.44 PH (ref 7.35–7.45)
PH BLDV: 7.28 PH (ref 7.32–7.43)
PH BLDV: 7.34 PH (ref 7.32–7.43)
PH BLDV: 7.36 PH (ref 7.32–7.43)
PH BLDV: 7.4 PH (ref 7.32–7.43)
PH BLDV: 7.4 PH (ref 7.32–7.43)
PHOSPHATE SERPL-MCNC: 3.1 MG/DL (ref 2.5–4.5)
PHOSPHATE SERPL-MCNC: 4 MG/DL (ref 2.5–4.5)
PLATELET # BLD AUTO: 117 10E9/L (ref 150–450)
PLATELET # BLD AUTO: 149 10E9/L (ref 150–450)
PLATELET # BLD AUTO: 167 10E9/L (ref 150–450)
PO2 BLD: 159 MM HG (ref 80–105)
PO2 BLD: 159 MM HG (ref 80–105)
PO2 BLD: 162 MM HG (ref 80–105)
PO2 BLD: 237 MM HG (ref 80–105)
PO2 BLD: 319 MM HG (ref 80–105)
PO2 BLD: 382 MM HG (ref 80–105)
PO2 BLD: 392 MM HG (ref 80–105)
PO2 BLD: 493 MM HG (ref 80–105)
PO2 BLD: 496 MM HG (ref 80–105)
PO2 BLD: 547 MM HG (ref 80–105)
PO2 BLDV: 33 MM HG (ref 25–47)
PO2 BLDV: 34 MM HG (ref 25–47)
PO2 BLDV: 36 MM HG (ref 25–47)
PO2 BLDV: 43 MM HG (ref 25–47)
PO2 BLDV: 49 MM HG (ref 25–47)
POTASSIUM BLD-SCNC: 3.8 MMOL/L (ref 3.4–5.3)
POTASSIUM BLD-SCNC: 3.9 MMOL/L (ref 3.4–5.3)
POTASSIUM BLD-SCNC: 3.9 MMOL/L (ref 3.4–5.3)
POTASSIUM BLD-SCNC: 4.1 MMOL/L (ref 3.4–5.3)
POTASSIUM BLD-SCNC: 4.2 MMOL/L (ref 3.4–5.3)
POTASSIUM BLD-SCNC: 4.4 MMOL/L (ref 3.4–5.3)
POTASSIUM BLD-SCNC: 4.5 MMOL/L (ref 3.4–5.3)
POTASSIUM BLD-SCNC: 4.8 MMOL/L (ref 3.4–5.3)
POTASSIUM SERPL-SCNC: 4.4 MMOL/L (ref 3.4–5.3)
POTASSIUM SERPL-SCNC: 4.7 MMOL/L (ref 3.4–5.3)
POTASSIUM SERPL-SCNC: 5.1 MMOL/L (ref 3.4–5.3)
RBC # BLD AUTO: 2.8 10E12/L (ref 4.4–5.9)
RBC # BLD AUTO: 3 10E12/L (ref 4.4–5.9)
RBC # BLD AUTO: 3.45 10E12/L (ref 4.4–5.9)
SODIUM BLD-SCNC: 136 MMOL/L (ref 133–144)
SODIUM BLD-SCNC: 137 MMOL/L (ref 133–144)
SODIUM BLD-SCNC: 137 MMOL/L (ref 133–144)
SODIUM BLD-SCNC: 138 MMOL/L (ref 133–144)
SODIUM BLD-SCNC: 138 MMOL/L (ref 133–144)
SODIUM BLD-SCNC: 139 MMOL/L (ref 133–144)
SODIUM SERPL-SCNC: 139 MMOL/L (ref 133–144)
SODIUM SERPL-SCNC: 140 MMOL/L (ref 133–144)
SODIUM SERPL-SCNC: 141 MMOL/L (ref 133–144)
SPECIMEN EXP DATE BLD: NORMAL
TRANSFUSION STATUS PATIENT QL: NORMAL
WBC # BLD AUTO: 13 10E9/L (ref 4–11)
WBC # BLD AUTO: 17.2 10E9/L (ref 4–11)
WBC # BLD AUTO: 20.5 10E9/L (ref 4–11)

## 2017-06-20 PROCEDURE — 00000146 ZZHCL STATISTIC GLUCOSE BY METER IP

## 2017-06-20 PROCEDURE — P9041 ALBUMIN (HUMAN),5%, 50ML: HCPCS | Performed by: THORACIC SURGERY (CARDIOTHORACIC VASCULAR SURGERY)

## 2017-06-20 PROCEDURE — 84132 ASSAY OF SERUM POTASSIUM: CPT | Performed by: ANESTHESIOLOGY

## 2017-06-20 PROCEDURE — 85018 HEMOGLOBIN: CPT | Performed by: INTERNAL MEDICINE

## 2017-06-20 PROCEDURE — 33968 REMOVE AORTIC ASSIST DEVICE: CPT

## 2017-06-20 PROCEDURE — 83735 ASSAY OF MAGNESIUM: CPT | Performed by: INTERNAL MEDICINE

## 2017-06-20 PROCEDURE — 85014 HEMATOCRIT: CPT | Performed by: INTERNAL MEDICINE

## 2017-06-20 PROCEDURE — 85027 COMPLETE CBC AUTOMATED: CPT | Performed by: INTERNAL MEDICINE

## 2017-06-20 PROCEDURE — 84100 ASSAY OF PHOSPHORUS: CPT | Performed by: NEUROLOGICAL SURGERY

## 2017-06-20 PROCEDURE — 85610 PROTHROMBIN TIME: CPT | Performed by: NEUROLOGICAL SURGERY

## 2017-06-20 PROCEDURE — 40000275 ZZH STATISTIC RCP TIME EA 10 MIN

## 2017-06-20 PROCEDURE — 83605 ASSAY OF LACTIC ACID: CPT | Performed by: SURGERY

## 2017-06-20 PROCEDURE — 25000132 ZZH RX MED GY IP 250 OP 250 PS 637: Performed by: THORACIC SURGERY (CARDIOTHORACIC VASCULAR SURGERY)

## 2017-06-20 PROCEDURE — 40000281 ZZH STATISTIC TRANSPORT TIME EA 15 MIN

## 2017-06-20 PROCEDURE — 83605 ASSAY OF LACTIC ACID: CPT | Performed by: ANESTHESIOLOGY

## 2017-06-20 PROCEDURE — 25800025 ZZH RX 258: Performed by: NEUROLOGICAL SURGERY

## 2017-06-20 PROCEDURE — 82805 BLOOD GASES W/O2 SATURATION: CPT | Performed by: SURGERY

## 2017-06-20 PROCEDURE — 83735 ASSAY OF MAGNESIUM: CPT | Performed by: NEUROLOGICAL SURGERY

## 2017-06-20 PROCEDURE — 82330 ASSAY OF CALCIUM: CPT | Performed by: SURGERY

## 2017-06-20 PROCEDURE — 84295 ASSAY OF SERUM SODIUM: CPT | Performed by: ANESTHESIOLOGY

## 2017-06-20 PROCEDURE — 25000125 ZZHC RX 250: Performed by: NEUROLOGICAL SURGERY

## 2017-06-20 PROCEDURE — 83735 ASSAY OF MAGNESIUM: CPT | Performed by: THORACIC SURGERY (CARDIOTHORACIC VASCULAR SURGERY)

## 2017-06-20 PROCEDURE — 85610 PROTHROMBIN TIME: CPT | Performed by: THORACIC SURGERY (CARDIOTHORACIC VASCULAR SURGERY)

## 2017-06-20 PROCEDURE — 94799 UNLISTED PULMONARY SVC/PX: CPT

## 2017-06-20 PROCEDURE — 80048 BASIC METABOLIC PNL TOTAL CA: CPT | Performed by: THORACIC SURGERY (CARDIOTHORACIC VASCULAR SURGERY)

## 2017-06-20 PROCEDURE — 80048 BASIC METABOLIC PNL TOTAL CA: CPT | Performed by: INTERNAL MEDICINE

## 2017-06-20 PROCEDURE — 82330 ASSAY OF CALCIUM: CPT | Performed by: INTERNAL MEDICINE

## 2017-06-20 PROCEDURE — 40000076 ZZH STATISTIC IABP MONITORING

## 2017-06-20 PROCEDURE — 84100 ASSAY OF PHOSPHORUS: CPT | Performed by: THORACIC SURGERY (CARDIOTHORACIC VASCULAR SURGERY)

## 2017-06-20 PROCEDURE — 85384 FIBRINOGEN ACTIVITY: CPT | Performed by: THORACIC SURGERY (CARDIOTHORACIC VASCULAR SURGERY)

## 2017-06-20 PROCEDURE — 82803 BLOOD GASES ANY COMBINATION: CPT | Performed by: ANESTHESIOLOGY

## 2017-06-20 PROCEDURE — 93284 PRGRMG EVAL IMPLANTABLE DFB: CPT | Mod: 26 | Performed by: INTERNAL MEDICINE

## 2017-06-20 PROCEDURE — P9016 RBC LEUKOCYTES REDUCED: HCPCS | Performed by: SURGERY

## 2017-06-20 PROCEDURE — 84132 ASSAY OF SERUM POTASSIUM: CPT | Performed by: INTERNAL MEDICINE

## 2017-06-20 PROCEDURE — 82805 BLOOD GASES W/O2 SATURATION: CPT | Performed by: INTERNAL MEDICINE

## 2017-06-20 PROCEDURE — 82330 ASSAY OF CALCIUM: CPT | Performed by: ANESTHESIOLOGY

## 2017-06-20 PROCEDURE — S5010 5% DEXTROSE AND 0.45% SALINE: HCPCS | Performed by: NEUROLOGICAL SURGERY

## 2017-06-20 PROCEDURE — 93005 ELECTROCARDIOGRAM TRACING: CPT

## 2017-06-20 PROCEDURE — 40000985 XR CHEST PORT 1 VW

## 2017-06-20 PROCEDURE — 40000048 ZZH STATISTIC DAILY SWAN MONITORING

## 2017-06-20 PROCEDURE — 80048 BASIC METABOLIC PNL TOTAL CA: CPT | Performed by: NEUROLOGICAL SURGERY

## 2017-06-20 PROCEDURE — 94002 VENT MGMT INPAT INIT DAY: CPT

## 2017-06-20 PROCEDURE — 99233 SBSQ HOSP IP/OBS HIGH 50: CPT | Mod: 25 | Performed by: INTERNAL MEDICINE

## 2017-06-20 PROCEDURE — 25000125 ZZHC RX 250: Performed by: THORACIC SURGERY (CARDIOTHORACIC VASCULAR SURGERY)

## 2017-06-20 PROCEDURE — 82330 ASSAY OF CALCIUM: CPT | Performed by: THORACIC SURGERY (CARDIOTHORACIC VASCULAR SURGERY)

## 2017-06-20 PROCEDURE — 25000125 ZZHC RX 250: Performed by: NURSE ANESTHETIST, CERTIFIED REGISTERED

## 2017-06-20 PROCEDURE — 93010 ELECTROCARDIOGRAM REPORT: CPT | Performed by: INTERNAL MEDICINE

## 2017-06-20 PROCEDURE — 85027 COMPLETE CBC AUTOMATED: CPT | Performed by: NEUROLOGICAL SURGERY

## 2017-06-20 PROCEDURE — 85730 THROMBOPLASTIN TIME PARTIAL: CPT | Performed by: THORACIC SURGERY (CARDIOTHORACIC VASCULAR SURGERY)

## 2017-06-20 PROCEDURE — P9041 ALBUMIN (HUMAN),5%, 50ML: HCPCS

## 2017-06-20 PROCEDURE — 84132 ASSAY OF SERUM POTASSIUM: CPT | Performed by: SURGERY

## 2017-06-20 PROCEDURE — 40000196 ZZH STATISTIC RAPCV CVP MONITORING

## 2017-06-20 PROCEDURE — 85610 PROTHROMBIN TIME: CPT | Performed by: INTERNAL MEDICINE

## 2017-06-20 PROCEDURE — 85018 HEMOGLOBIN: CPT | Performed by: THORACIC SURGERY (CARDIOTHORACIC VASCULAR SURGERY)

## 2017-06-20 PROCEDURE — 20000004 ZZH R&B ICU UMMC

## 2017-06-20 PROCEDURE — 83605 ASSAY OF LACTIC ACID: CPT | Performed by: THORACIC SURGERY (CARDIOTHORACIC VASCULAR SURGERY)

## 2017-06-20 PROCEDURE — 85027 COMPLETE CBC AUTOMATED: CPT | Performed by: THORACIC SURGERY (CARDIOTHORACIC VASCULAR SURGERY)

## 2017-06-20 PROCEDURE — 25000128 H RX IP 250 OP 636: Performed by: THORACIC SURGERY (CARDIOTHORACIC VASCULAR SURGERY)

## 2017-06-20 PROCEDURE — 82947 ASSAY GLUCOSE BLOOD QUANT: CPT | Performed by: ANESTHESIOLOGY

## 2017-06-20 PROCEDURE — 25000128 H RX IP 250 OP 636

## 2017-06-20 PROCEDURE — 93290 INTERROG DEV EVAL ICPMS IP: CPT | Mod: 26 | Performed by: INTERNAL MEDICINE

## 2017-06-20 PROCEDURE — 82805 BLOOD GASES W/O2 SATURATION: CPT | Performed by: THORACIC SURGERY (CARDIOTHORACIC VASCULAR SURGERY)

## 2017-06-20 PROCEDURE — 40000014 ZZH STATISTIC ARTERIAL MONITORING DAILY

## 2017-06-20 PROCEDURE — 93296 REM INTERROG EVL PM/IDS: CPT | Mod: ZF

## 2017-06-20 PROCEDURE — 71010 XR CHEST PORT 1 VW: CPT

## 2017-06-20 RX ORDER — PROPOFOL 10 MG/ML
INJECTION, EMULSION INTRAVENOUS CONTINUOUS PRN
Status: DISCONTINUED | OUTPATIENT
Start: 2017-06-20 | End: 2017-06-20

## 2017-06-20 RX ORDER — POTASSIUM CL/LIDO/0.9 % NACL 10MEQ/0.1L
10 INTRAVENOUS SOLUTION, PIGGYBACK (ML) INTRAVENOUS
Status: DISCONTINUED | OUTPATIENT
Start: 2017-06-20 | End: 2017-06-30 | Stop reason: HOSPADM

## 2017-06-20 RX ORDER — NALOXONE HYDROCHLORIDE 0.4 MG/ML
.1-.4 INJECTION, SOLUTION INTRAMUSCULAR; INTRAVENOUS; SUBCUTANEOUS
Status: DISCONTINUED | OUTPATIENT
Start: 2017-06-20 | End: 2017-06-30 | Stop reason: HOSPADM

## 2017-06-20 RX ORDER — ALBUMIN, HUMAN INJ 5% 5 %
500-1000 SOLUTION INTRAVENOUS
Status: COMPLETED | OUTPATIENT
Start: 2017-06-20 | End: 2017-06-20

## 2017-06-20 RX ORDER — PROPOFOL 10 MG/ML
10-20 INJECTION, EMULSION INTRAVENOUS EVERY 30 MIN PRN
Status: DISCONTINUED | OUTPATIENT
Start: 2017-06-20 | End: 2017-06-21

## 2017-06-20 RX ORDER — AMOXICILLIN 250 MG
1-2 CAPSULE ORAL 2 TIMES DAILY
Status: DISCONTINUED | OUTPATIENT
Start: 2017-06-20 | End: 2017-06-29

## 2017-06-20 RX ORDER — ONDANSETRON 2 MG/ML
4 INJECTION INTRAMUSCULAR; INTRAVENOUS EVERY 6 HOURS PRN
Status: DISCONTINUED | OUTPATIENT
Start: 2017-06-20 | End: 2017-06-30 | Stop reason: HOSPADM

## 2017-06-20 RX ORDER — MEPERIDINE HYDROCHLORIDE 25 MG/ML
12.5-25 INJECTION INTRAMUSCULAR; INTRAVENOUS; SUBCUTANEOUS
Status: DISCONTINUED | OUTPATIENT
Start: 2017-06-20 | End: 2017-06-20

## 2017-06-20 RX ORDER — ALBUTEROL SULFATE 90 UG/1
6 AEROSOL, METERED RESPIRATORY (INHALATION) EVERY 4 HOURS PRN
Status: DISCONTINUED | OUTPATIENT
Start: 2017-06-20 | End: 2017-06-22

## 2017-06-20 RX ORDER — PROPOFOL 10 MG/ML
5-75 INJECTION, EMULSION INTRAVENOUS CONTINUOUS
Status: DISCONTINUED | OUTPATIENT
Start: 2017-06-20 | End: 2017-06-21

## 2017-06-20 RX ORDER — POTASSIUM CHLORIDE 7.45 MG/ML
10 INJECTION INTRAVENOUS
Status: DISCONTINUED | OUTPATIENT
Start: 2017-06-20 | End: 2017-06-30 | Stop reason: HOSPADM

## 2017-06-20 RX ORDER — DEXTROSE MONOHYDRATE 25 G/50ML
25-50 INJECTION, SOLUTION INTRAVENOUS
Status: DISCONTINUED | OUTPATIENT
Start: 2017-06-20 | End: 2017-06-22

## 2017-06-20 RX ORDER — ALBUMIN, HUMAN INJ 5% 5 %
12.5 SOLUTION INTRAVENOUS ONCE
Status: COMPLETED | OUTPATIENT
Start: 2017-06-20 | End: 2017-06-20

## 2017-06-20 RX ORDER — MUPIROCIN 20 MG/G
1 OINTMENT TOPICAL 2 TIMES DAILY
Status: DISPENSED | OUTPATIENT
Start: 2017-06-20 | End: 2017-06-24

## 2017-06-20 RX ORDER — FENTANYL CITRATE 50 UG/ML
50-100 INJECTION, SOLUTION INTRAMUSCULAR; INTRAVENOUS
Status: DISCONTINUED | OUTPATIENT
Start: 2017-06-20 | End: 2017-06-21

## 2017-06-20 RX ORDER — LIDOCAINE 40 MG/G
CREAM TOPICAL
Status: DISCONTINUED | OUTPATIENT
Start: 2017-06-20 | End: 2017-06-30 | Stop reason: HOSPADM

## 2017-06-20 RX ORDER — POTASSIUM CHLORIDE 1.5 G/1.58G
20-40 POWDER, FOR SOLUTION ORAL
Status: DISCONTINUED | OUTPATIENT
Start: 2017-06-20 | End: 2017-06-30 | Stop reason: HOSPADM

## 2017-06-20 RX ORDER — POTASSIUM CHLORIDE 29.8 MG/ML
20 INJECTION INTRAVENOUS
Status: DISCONTINUED | OUTPATIENT
Start: 2017-06-20 | End: 2017-06-30 | Stop reason: HOSPADM

## 2017-06-20 RX ORDER — ACETAMINOPHEN 325 MG/1
650 TABLET ORAL EVERY 4 HOURS PRN
Status: DISCONTINUED | OUTPATIENT
Start: 2017-06-20 | End: 2017-06-23

## 2017-06-20 RX ORDER — NICOTINE POLACRILEX 4 MG
15-30 LOZENGE BUCCAL
Status: DISCONTINUED | OUTPATIENT
Start: 2017-06-20 | End: 2017-06-22

## 2017-06-20 RX ORDER — POTASSIUM CHLORIDE 750 MG/1
20-40 TABLET, EXTENDED RELEASE ORAL
Status: DISCONTINUED | OUTPATIENT
Start: 2017-06-20 | End: 2017-06-30 | Stop reason: HOSPADM

## 2017-06-20 RX ORDER — ALBUMIN, HUMAN INJ 5% 5 %
SOLUTION INTRAVENOUS
Status: COMPLETED
Start: 2017-06-20 | End: 2017-06-20

## 2017-06-20 RX ORDER — ALBUMIN, HUMAN INJ 5% 5 %
25 SOLUTION INTRAVENOUS ONCE
Status: COMPLETED | OUTPATIENT
Start: 2017-06-20 | End: 2017-06-20

## 2017-06-20 RX ORDER — LEVOFLOXACIN 5 MG/ML
500 INJECTION, SOLUTION INTRAVENOUS EVERY 24 HOURS
Status: COMPLETED | OUTPATIENT
Start: 2017-06-20 | End: 2017-06-21

## 2017-06-20 RX ORDER — FLUCONAZOLE 2 MG/ML
200 INJECTION, SOLUTION INTRAVENOUS EVERY 24 HOURS
Status: COMPLETED | OUTPATIENT
Start: 2017-06-20 | End: 2017-06-21

## 2017-06-20 RX ORDER — MAGNESIUM SULFATE HEPTAHYDRATE 40 MG/ML
4 INJECTION, SOLUTION INTRAVENOUS EVERY 4 HOURS PRN
Status: DISCONTINUED | OUTPATIENT
Start: 2017-06-20 | End: 2017-06-30 | Stop reason: HOSPADM

## 2017-06-20 RX ORDER — DEXTROSE MONOHYDRATE, SODIUM CHLORIDE, AND POTASSIUM CHLORIDE 50; 1.49; 4.5 G/1000ML; G/1000ML; G/1000ML
INJECTION, SOLUTION INTRAVENOUS CONTINUOUS
Status: DISCONTINUED | OUTPATIENT
Start: 2017-06-20 | End: 2017-06-20

## 2017-06-20 RX ORDER — DEXMEDETOMIDINE HYDROCHLORIDE 4 UG/ML
0.2-0.7 INJECTION, SOLUTION INTRAVENOUS CONTINUOUS
Status: DISCONTINUED | OUTPATIENT
Start: 2017-06-20 | End: 2017-06-22

## 2017-06-20 RX ORDER — ACETAMINOPHEN 650 MG/1
650 SUPPOSITORY RECTAL EVERY 4 HOURS PRN
Status: DISCONTINUED | OUTPATIENT
Start: 2017-06-20 | End: 2017-06-23

## 2017-06-20 RX ORDER — ONDANSETRON 4 MG/1
4 TABLET, ORALLY DISINTEGRATING ORAL EVERY 6 HOURS PRN
Status: DISCONTINUED | OUTPATIENT
Start: 2017-06-20 | End: 2017-06-30 | Stop reason: HOSPADM

## 2017-06-20 RX ORDER — NALOXONE HYDROCHLORIDE 0.4 MG/ML
.1-.4 INJECTION, SOLUTION INTRAMUSCULAR; INTRAVENOUS; SUBCUTANEOUS
Status: DISCONTINUED | OUTPATIENT
Start: 2017-06-20 | End: 2017-06-20

## 2017-06-20 RX ORDER — ALBUMIN, HUMAN INJ 5% 5 %
12.5 SOLUTION INTRAVENOUS ONCE
Status: DISCONTINUED | OUTPATIENT
Start: 2017-06-20 | End: 2017-06-21 | Stop reason: CLARIF

## 2017-06-20 RX ADMIN — LEVOFLOXACIN 500 MG: 5 INJECTION, SOLUTION INTRAVENOUS at 07:42

## 2017-06-20 RX ADMIN — VASOPRESSIN 0.5 UNITS/HR: 20 INJECTION, SOLUTION INTRAMUSCULAR; SUBCUTANEOUS at 21:24

## 2017-06-20 RX ADMIN — ALBUMIN HUMAN 12.5 G: 50 SOLUTION INTRAVENOUS at 05:51

## 2017-06-20 RX ADMIN — PROPOFOL 40 MCG/KG/MIN: 10 INJECTION, EMULSION INTRAVENOUS at 14:33

## 2017-06-20 RX ADMIN — ALBUMIN (HUMAN) 500 ML: 12.5 SOLUTION INTRAVENOUS at 02:45

## 2017-06-20 RX ADMIN — HUMAN INSULIN 2 UNITS/HR: 100 INJECTION, SOLUTION SUBCUTANEOUS at 00:25

## 2017-06-20 RX ADMIN — Medication 2 G: at 08:46

## 2017-06-20 RX ADMIN — ALBUMIN (HUMAN) 12.5 G: 12.5 SOLUTION INTRAVENOUS at 08:26

## 2017-06-20 RX ADMIN — PANTOPRAZOLE SODIUM 40 MG: 40 INJECTION, POWDER, FOR SOLUTION INTRAVENOUS at 07:35

## 2017-06-20 RX ADMIN — ALBUMIN (HUMAN) 25 G: 12.5 SOLUTION INTRAVENOUS at 04:18

## 2017-06-20 RX ADMIN — SENNOSIDES AND DOCUSATE SODIUM 1 TABLET: 8.6; 5 TABLET ORAL at 20:16

## 2017-06-20 RX ADMIN — FENTANYL CITRATE 50 MCG/HR: 50 INJECTION, SOLUTION INTRAMUSCULAR; INTRAVENOUS at 03:18

## 2017-06-20 RX ADMIN — PROPOFOL 40 MCG/KG/MIN: 10 INJECTION, EMULSION INTRAVENOUS at 03:13

## 2017-06-20 RX ADMIN — PROPOFOL 25 MCG/KG/MIN: 10 INJECTION, EMULSION INTRAVENOUS at 00:29

## 2017-06-20 RX ADMIN — VANCOMYCIN HYDROCHLORIDE 1500 MG: 10 INJECTION, POWDER, LYOPHILIZED, FOR SOLUTION INTRAVENOUS at 10:18

## 2017-06-20 RX ADMIN — HUMAN INSULIN 3 UNITS/HR: 100 INJECTION, SOLUTION SUBCUTANEOUS at 12:06

## 2017-06-20 RX ADMIN — ROCURONIUM BROMIDE 50 MG: 10 INJECTION INTRAVENOUS at 00:43

## 2017-06-20 RX ADMIN — PROPOFOL 40 MCG/KG/MIN: 10 INJECTION, EMULSION INTRAVENOUS at 20:52

## 2017-06-20 RX ADMIN — FENTANYL CITRATE 250 MCG: 50 INJECTION, SOLUTION INTRAMUSCULAR; INTRAVENOUS at 00:11

## 2017-06-20 RX ADMIN — SODIUM CHLORIDE 600 MG: 9 INJECTION, SOLUTION INTRAVENOUS at 08:48

## 2017-06-20 RX ADMIN — ALBUMIN (HUMAN) 12.5 G: 12.5 SOLUTION INTRAVENOUS at 05:51

## 2017-06-20 RX ADMIN — PROPOFOL 30 MCG/KG/MIN: 10 INJECTION, EMULSION INTRAVENOUS at 06:55

## 2017-06-20 RX ADMIN — DEXTROSE AND SODIUM CHLORIDE: 5; 450 INJECTION, SOLUTION INTRAVENOUS at 02:37

## 2017-06-20 RX ADMIN — FLUCONAZOLE 200 MG: 200 INJECTION, SOLUTION INTRAVENOUS at 09:12

## 2017-06-20 RX ADMIN — ALBUMIN HUMAN 25 G: 50 SOLUTION INTRAVENOUS at 04:18

## 2017-06-20 RX ADMIN — MUPIROCIN 1 G: 20 OINTMENT TOPICAL at 09:14

## 2017-06-20 RX ADMIN — MUPIROCIN 1 G: 20 OINTMENT TOPICAL at 20:16

## 2017-06-20 RX ADMIN — PROPOFOL 40 MCG/KG/MIN: 10 INJECTION, EMULSION INTRAVENOUS at 10:46

## 2017-06-20 RX ADMIN — FENTANYL CITRATE 50 MCG: 50 INJECTION, SOLUTION INTRAMUSCULAR; INTRAVENOUS at 03:19

## 2017-06-20 NOTE — PLAN OF CARE
Problem: Goal Outcome Summary  Goal: Goal Outcome Summary  PT 4E: Cx, pt not medically stable for PT at this time. Plan for OT to initiate ROM when medically stable, potentially tomorrow. Will hold on PT services until pt further appropriate to mobilize.

## 2017-06-20 NOTE — PROGRESS NOTES
Patient seen on 4E for evaluation and iterative programming of a [employer] Multi Lead ICD per MD orders. Patient has order to re-program ICD post LVAD last evening. Normal ICD function.  0 episodes recorded.  Intrinsic rhythm = SR @ 64 BPM bpm.  AP= 3.0% and = 90.9%. OptiVol fluid index is above baseline.  Estimated battery longevity = 5.7 years to [facility]. No short v-v intervals recorded.  RV lead impedance trends appear stable. Patient is intubated post surgery. Changes made: DDD 60 ICD off to DDDR 50/130, ICD on at 188 BPM with 150 monitor zone, P waves measure 0.90 MV changed sensitivity from 0.60 MV to 0.45 MV.     ICD Multi Lead

## 2017-06-20 NOTE — PROGRESS NOTES
CVICU Progress Note  06/20/2017    Subjective and Interval Events: Overnight received 750 5% albumin and 1u p RBCs for low flow on LVAD and CI 1.8, both parameters responded appropriately.    Objective:  Vitals:   Temp:  [98.4  F (36.9  C)-99.3  F (37.4  C)] 99.3  F (37.4  C)  Heart Rate:  [] 73  Resp:  [15-18] 15  BP: ()/(52-75) 109/64  MAP:  [63 mmHg-81 mmHg] 81 mmHg  Arterial Line BP: ()/(45-63) 114/58  FiO2 (%):  [40 %-100 %] 40 %  SpO2:  [93 %-100 %] 100 %    Ventilation Mode: CMV/AC  FiO2 (%): 40 %  Rate Set (breaths/minute): 18 breaths/min  Tidal Volume Set (mL): 550 mL  PEEP (cm H2O): 6 cmH2O  Oxygen Concentration (%): 60 %  Resp: 15    Intake/Output:   I/O last 3 completed shifts:  In: 4985.11 [I.V.:2231.11; Other:850; NG/GT:30]  Out: 3136 [Urine:1903; Blood:1000; Chest Tube:233]    Physical exam:  General: NAD  Neuro: sedated  Resp: intubated, vented  CV: RRR, LVAD  Abdomen: Soft, Non-distended, Non-tender  Extremities: warm and well perfused    Labs:    Recent Labs  Lab 06/20/17  0439 06/20/17  0131   WBC 17.2* 20.5*   HGB 8.7* 10.2*   * 167   INR 1.44* 1.42*    139   POTASSIUM 4.7 5.1   BUN 26 25   CR 1.76* 1.49*       Assessment and Plan: 62M with NICM (EF 20%) s/p Bi-V ICD, severe MR, VT, paroxsymal afib, DM, HTN, CKD, CECILIA, MDD s/p LVAD placement on 6/19. Currently also with IABP in place.    Neuro:  Fentanyl gtt, propofol gtt, prn tylenol  CV:  Wean epi and vaso gtt as tolerated. Increase LVAD speed to 9000; IABP out this PM if HDs stable.  Resp:  Intubated, vented, PEEP 8, wean NO  GI: PPI  FEN:  NPO  :  Christopher, strict I/O.   Heme:  Hgb 8.7, 1u pRBCs give, recheck  ID:  Fluconazole, Levaquin, Rifampin, Vanco  Imm: None indicated  Endo:  Insulin gtt, BS stable  PPx:  SCDs, PPI  Lines: CVC, a line, CTs, christopher, PIV, ETT  Dispo:  CVICU    Seen and discussed with Dr. Reddy.    Myke Sanchez MD PGY-3  Surgery Resident    Patient seen and examined. Will wean ventilator as  tolerated. Continue current management plan. Agree with the findings in the Resident's note. I spent a total of 45 minutes examining the patient, reviewing investigations and therapeutic counseling.

## 2017-06-20 NOTE — PROGRESS NOTES
CVICU Progress Note  06/20/2017    MAJOR CHANGE/PLAN:  -Wean IABP as tolerated around noon (tolerated off IABP with stable MAP's)  -Wean NO after IABP removal   -IV Fluid for low UOP    Subjective and Interval Events: Overnight received 750 5% albumin and 1u p RBCs for low flow on LVAD and CI 1.8, both parameters responded appropriately. Weaning NO this AM. LVAD speed increased.    Objective:  Vitals:   Temp:  [98.4  F (36.9  C)-99.3  F (37.4  C)] 99  F (37.2  C)  Heart Rate:  [] 77  Resp:  [15-18] 18  BP: ()/(52-75) 109/64  MAP:  [63 mmHg-84 mmHg] 69 mmHg  Arterial Line BP: ()/(45-65) 102/51  FiO2 (%):  [40 %-100 %] 40 %  SpO2:  [93 %-100 %] 100 %    Ventilation Mode: CMV/AC  FiO2 (%): 40 %  Rate Set (breaths/minute): 18 breaths/min  Tidal Volume Set (mL): 550 mL  PEEP (cm H2O): 6 cmH2O  Oxygen Concentration (%): 60 %  Resp: 18    Intake/Output:   I/O last 3 completed shifts:  In: 4985.11 [I.V.:2231.11; Other:850; NG/GT:30]  Out: 3136 [Urine:1903; Blood:1000; Chest Tube:233]    Physical exam:  General: NAD  Neuro: sedated  Resp: intubated, vented  CV: RRR, LVAD  Abdomen: Soft, Non-distended, Non-tender  Extremities: warm and well perfused    Labs:    Recent Labs  Lab 06/20/17  0439 06/20/17  0131   WBC 17.2* 20.5*   HGB 8.7* 10.2*   * 167   INR 1.44* 1.42*    139   POTASSIUM 4.7 5.1   BUN 26 25   CR 1.76* 1.49*       Assessment and Plan: 62M with NICM (EF 20%) s/p Bi-V ICD, severe MR, VT, paroxsymal afib, DM, HTN, CKD, CECILIA, MDD. Currently also with IABP in place.    Neuro:  Fentanyl gtt, propofol gtt, Fentanyl PRN, monitor off sedation  CV:  Epi, Vaso, weaned Norepi. LVAD parameters stable  Resp:  Wean as able, stable ABG.  GI: PPI  FEN:  mIVF  :  Russell, strict I/O. Lower urine output  Heme:  Monitor, stable after transfusion.  ID:  Fluconazole, Levaquin, Rifampin, Vanco  Imm: None indicated  Endo:  Insulin gtt, BS stable  PPx:  Monitor bleeding. Hold for now.  Lines: CVC (R IJ),  Art line, CT (Pleural and Mediastinal), ETT  Dispo:  CVICU    Seen and discussed with Dr. Rodriguez.    Gage Tapia MD  Anesthesia Resident, CA-1

## 2017-06-20 NOTE — ANESTHESIA PROCEDURE NOTES
CHUN Probe Insertion Note  Probe Inserted by: KOMAL MERCEDES   Insertion method: CHUN probe easily inserted   Bite block used:   Yes      Probe type:  Adult 3D   Insertion complications: No complications.      Billing for CHUN report is being done by Anesthesiology

## 2017-06-20 NOTE — PROGRESS NOTES
D: Stopped by to check in on patient and spouse. Patient remains intubated, sedated. Discussed with wife that we will set up education when patient is extubated and alert and able to participate. Provided support and listened to care giver's thoughts and concerns.  P: Continue to follow patient and address any questions or concerns patient and or caregiver may have.

## 2017-06-20 NOTE — PROGRESS NOTES
Care Coordinator Progress Note     Admission Date/Time:  6/15/2017  Attending MD:  Ronnie Quigley MD     Data  Chart reviewed, discussed with interdisciplinary team.   Patient was admitted for:    Heart failure (H)  Heart failure, unspecified heart failure chronicity,     Concerns with insurance coverage for discharge needs: None.  Current Living Situation: Patient lives with spouse.  Support System: Supportive and Involved  Services Involved: None  Transportation: Family or Friend will provide  Barriers to Discharge: pt is not medically ready for d/c.       Assessment  Pt had LVAD placement done yesterday, 6/19.  Pt is still vented, sedated and with IABP.  Plan to d/c IABP today.  Visited pt spouse for support and to introduce self.  CC role discussed and contact info provided.  Pt spouse stated the team have been updating her well about the plan of care.     Plan  Anticipated Discharge Date:  TBD.  Anticipated Discharge Plan:   TBD.  CC will cont to follow plan of care.      Allie Billingsley RN, BSN  4A and 4E/ ICU  Care Coordinator  Phone: [phone]  Pager: [phone]

## 2017-06-20 NOTE — MR AVS SNAPSHOT
"              After Visit Summary   2017    Jim Willingham    MRN: [id number]           Patient Information     Date Of Birth          1955        Visit Information        Provider Department      2017 5:00 AM 1,  Cv Device [facility]        Today's Diagnoses     NICM (nonischemic cardiomyopathy) (H)/ EF 20%    -  1       Follow-ups after your visit        Who to contact     If you have questions or need follow up information about today's clinic visit or your schedule please contact [facility] directly at [phone].  Normal or non-critical lab and imaging results will be communicated to you by MobileHandshakehart, letter or phone within 4 business days after the clinic has received the results. If you do not hear from us within 7 days, please contact the clinic through [employer] or phone. If you have a critical or abnormal lab result, we will notify you by phone as soon as possible.  Submit refill requests through [facility] or call your pharmacy and they will forward the refill request to us. Please allow 3 business days for your refill to be completed.          Additional Information About Your Visit        MyChart Information     [facility] lets you send messages to your doctor, view your test results, renew your prescriptions, schedule appointments and more. To sign up, go to [email or web address] . Click on \"Log in\" on the left side of the screen, which will take you to the Welcome page. Then click on \"Sign up Now\" on the right side of the page.     You will be asked to enter the access code listed below, as well as some personal information. Please follow the directions to create your username and password.     Your access code is: [email or web address]  Expires: 2017  6:30 AM     Your access code will  in 90 days. If you need help or a new code, please call your [place] clinic or [phone].        Care EveryWhere ID     This is your Care EveryWhere ID. This could be used by other " organizations to access your [place] medical records  [id number]         Blood Pressure from Last 3 Encounters:   06/20/17 109/64   06/13/17 98/63   06/08/17 94/60    Weight from Last 3 Encounters:   06/20/17 112.8 kg (248 lb 10.9 oz)   06/13/17 118.5 kg (261 lb 3.9 oz)   06/08/17 116.1 kg (255 lb 14.4 oz)              We Performed the Following     INTERROGATION DEVICE EVAL REMOTE, PACER/ICD          Today's Medication Changes      Notice     This visit is during an admission. Changes to the med list made in this visit will be reflected in the After Visit Summary of the admission.             Primary Care Provider    Physician No Ref-Primary       No address on file        Thank you!     Thank you for choosing [facility]  for your care. Our goal is always to provide you with excellent care. Hearing back from our patients is one way we can continue to improve our services. Please take a few minutes to complete the written survey that you may receive in the mail after your visit with us. Thank you!             Your Updated Medication List - Protect others around you: Learn how to safely use, store and throw away your medicines at www.disposemymeds.org.      Notice     This visit is during an admission. Changes to the med list made in this visit will be reflected in the After Visit Summary of the admission.

## 2017-06-20 NOTE — ANESTHESIA POSTPROCEDURE EVALUATION
Patient: Jim Willingham    Procedure(s):  Heartmate II Left Ventricular Assist Device Insertion     Diagnosis:Heart Failure   Diagnosis Additional Information: No value filed.    Anesthesia Type:  General, ETT    Note:  Anesthesia Post Evaluation    Patient location during evaluation: ICU  Patient participation: Unable to evaluate secondary to administered sedation  Level of consciousness: obtunded/minimal responses  Pain management: unable to assess  Airway patency: patent  Cardiovascular status: acceptable  Respiratory status: acceptable, ETT and ventilator  Hydration status: acceptable  PONV: unable to assess     Anesthetic complications: None        Transported to ICU on full monitors and controlled ventilation and Victor Hugo at 20ppm.  Vital signs at time of transfer:  HR 81  /46 augmented  SpO2 100%    Supported hemodynamically with 0.05 mcg/kg/min epi, 0.03 mcg/kg/min norepi, vasopressin 1 unit/h, IABP at 1:1.  Sedated with propofol 50 mcg/kg/min.  Insulin running at 2 u/h.      Electronically Signed By: Rajani Padilla MD  June 20, 2017  4:53 AM

## 2017-06-20 NOTE — ANESTHESIA CARE TRANSFER NOTE
Patient: Jim Willingham    Procedure(s):  Heartmate II Left Ventricular Assist Device Insertion     Diagnosis: Heart Failure   Diagnosis Additional Information: No value filed.    Anesthesia Type:   General, ETT     Note:  Airway :ETT  Patient transferred to:ICU        Vitals: (Last set prior to Anesthesia Care Transfer)    CRNA VITALS  6/20/2017 0025 - 6/20/2017 0110      6/20/2017             Resp Rate (observed): 18    Resp Rate (set): 18                Electronically Signed By: TIM Rao CRNA  June 20, 2017  1:10 AM

## 2017-06-20 NOTE — ANESTHESIA PROCEDURE NOTES
Arterial Line Procedure Note  Staff:     Anesthesiologist:  KOMAL MERCEDES  Location: In OR After Induction  Procedure Start/Stop Times:     patient identified, IV checked, risks and benefits discussed, informed consent, monitors and equipment checked, pre-op evaluation and at physician/surgeon's request      Correct Patient: Yes      Correct Position: Yes      Correct Site: Yes      Correct Procedure: Yes      Correct Laterality:  N/A    Site Marked:  N/A  Line Placement:     Procedure:  Arterial Line    Insertion Site:  Radial    Insertion laterality:  Left    Skin Prep: Chloraprep      Patient Prep: patient draped      Local skin infiltration:  None    Ultrasound Guided?: No      Catheter size:  20 gauge, Quick cath    Cath secured with: suture      Dressing:  Tegaderm    Complications:  None obvious    Arterial waveform: Yes      IBP within 10% of NIBP: Yes

## 2017-06-20 NOTE — PROGRESS NOTES
Cardiology Progress Note    Events and interval changes in past 24 hours:   NE off  2 units plasma, 1 unit cell saver, 1 unit of blood    OBJECTIVE FINDINGS:  Temp: 99.1  F (37.3  C) Temp  Min: 98.4  F (36.9  C)  Max: 99.3  F (37.4  C)  Resp: 18 Resp  Min: 16  Max: 18  SpO2: 100 % SpO2  Min: 93 %  Max: 100 %    No Data Recorded  Heart Rate: 75 Heart Rate  Min: 75  Max: 105  BP: 109/64 Systolic (24hrs), Av , Min:82 , Max:111   Diastolic (24hrs), Av, Min:52, Max:75  cvp 12 pa 44/28/33 pa sat 66% ci 2.8 co 7.2 svr 800 hgb 10.1 bsa 2.3  cvp 9 pa 26/12/20 pa sat 67% CI 2.2 CO 6.5 Hgb 8.7 bsa 2.3     IABP aug diast  MAP 70-80    HM2 VAD flow 4.5-5.5 PI 6.5 speed 8800 power 5    CMV 18/550/6/60    Gen: intubated, sedated   Resp: clear to auscultation bilaterally, no crackles or wheezing   CV: RRR, normal S1/S2, no S3/S4,holosystolic murmur at ape, no peripheral edema  Abd:soft, nontender,   IABP    2.1/2 UO yest today 2.8/170cc urine 1L blood loss from surgery CT mediastinal 138 pleural 55    Intake/Output Summary (Last 24 hours) at 17 0609  Last data filed at 17 0500   Gross per 24 hour   Intake           1206.2 ml   Output             1950 ml   Net           -743.8 ml     Vitals:    17 1125 17 0400 17 0607 17 0500   Weight: 112.4 kg (247 lb 11.2 oz) 113.1 kg (249 lb 4.8 oz) 112.5 kg (248 lb 1.6 oz) 112.1 kg (247 lb 2.2 oz)    17 0515   Weight: 112.8 kg (248 lb 10.9 oz)     Inhaled NO    IV fluid REPLACEMENT ONLY       insulin (regular) 2 Units/hr (17 0500)     Reason beta blocker order not selected       propofol (DIPRIVAN) infusion 40 mcg/kg/min (17 0500)     EPINEPHrine IV infusion ADULT 0.03 mcg/kg/min (17 0529)     vasopressin (PITRESSIN) infusion ADULT (40 mL) 1 Units/hr (17 0500)     aminocaproic acid (AMICAR) infusion ADULT Stopped (17 0345)     norepinephrine Stopped (17 050)     dexmedetomidine       dextrose 5%  and 0.45% NaCl 30 mL/hr at 06/20/17 0500     fentaNYL 50 mcg/hr (06/20/17 0500)       sodium chloride (PF)  3 mL Intracatheter Q8H     insulin aspart   Subcutaneous TID w/meals     pantoprazole  40 mg Intravenous Daily     mupirocin  1 g Both Nostrils BID     vancomycin (VANCOCIN) IV  1,500 mg Intravenous Q12H     levofloxacin  500 mg Intravenous Q24H     fluconazole  200 mg Intravenous Q24H     rifampin  600 mg Intravenous Q24H     senna-docusate  1-2 tablet Oral BID     albumin human         albumin human  12.5 g Intravenous Once     pneumococcal vaccine  0.5 mL Intramuscular Prior to discharge   naloxone, IV fluid REPLACEMENT ONLY, glucose **OR** dextrose **OR** glucagon, lidocaine, lidocaine 4%, sodium chloride (PF), Reason beta blocker order not selected, insulin aspart, meperidine, albuterol, fentaNYL, propofol (DIPRIVAN) infusion **AND** propofol **AND** CK total **AND** Triglycerides, potassium chloride, potassium chloride, potassium chloride, potassium chloride with lidocaine, potassium chloride, magnesium sulfate, magnesium sulfate, potassium phosphate (KPHOS) in D5W IV, potassium phosphate (KPHOS) in D5W IV, potassium phosphate (KPHOS) in D5W IV, potassium phosphate (KPHOS) in D5W IV, potassium phosphate (KPHOS) in D5W IV, ondansetron **OR** ondansetron, acetaminophen **OR** acetaminophen  CMP  Recent Labs  Lab 06/20/17  0439 06/20/17  0131 06/19/17  1426 06/19/17  0340  06/18/17  0621  06/15/17  1155    139  --  137  --  138  < > 139   POTASSIUM 4.7 5.1 4.1 4.0  < > 3.7  < > 4.0   CHLORIDE 106 104  --  105  --  105  < > 103   CO2 24 24  --  26  --  27  < > 29   ANIONGAP 11 10  --  6  --  6  < > 7   * 186*  --  92  --  111*  < > 109*   BUN 26 25  --  24  --  29  < > 33*   CR 1.76* 1.49*  --  1.49*  --  1.42*  < > 1.41*   GFRESTIMATED 40* 48*  --  48*  --  51*  < > 51*   GFRESTBLACK 48* 58*  --  58*  --  62  < > 62   QAMAR 8.7 9.2  --  8.8  --  8.8  < > 9.0   MAG 2.1 2.0 2.1 2.3  < >  --   --    --    PHOS 3.1 4.0  --  3.0  --   --   --   --    PROTTOTAL  --   --   --   --   --   --   --  7.3   ALBUMIN  --   --   --   --   --   --   --  3.8   BILITOTAL  --   --   --   --   --   --   --  0.8   ALKPHOS  --   --   --   --   --   --   --  72   AST  --   --   --   --   --   --   --  23   ALT  --   --   --   --   --   --   --  26   < > = values in this interval not displayed.  CBC    Recent Labs  Lab 06/20/17 0439 06/20/17 0131 06/19/17 2320 06/19/17 0340 06/18/17  0621   WBC 17.2* 20.5*  --  6.4 5.4   RBC 3.00* 3.45*  --  3.46* 3.60*   HGB 8.7* 10.2*  --  10.1* 10.6*   HCT 27.5* 31.8*  --  31.6* 32.8*   MCV 92 92  --  91 91   MCH 29.0 29.6  --  29.2 29.4   MCHC 31.6 32.1  --  32.0 32.3   RDW 13.5 13.4  --  13.3 13.3   * 167 187 222 221     INR    Recent Labs  Lab 06/20/17 0439 06/20/17 0131 06/19/17 2320 06/19/17  0340   INR 1.44* 1.42* 1.60* 1.26*     Arterial Blood Gas  Recent Labs  Lab 06/20/17 0439 06/20/17 0436 06/20/17 0131 06/19/17  0340  06/15/17  1606   PH  --  7.44 7.33*  --   --  7.45   PCO2  --  38 43  --   --  41   PO2  --  237* 392*  --   --  99   HCO3  --  25 22  --   --  28   O2PER 60.0 60.0 100  100 21  < > 25   < > = values in this interval not displayed.      Imaging and other studies:  EKG: Vpaced rhythm  Echocardiogram: before LVAD 6/12  Severe left ventricular dilation is present. LVIDd 8.0 cm.  Severe diffuse hypokinesis is present. Posterior wall akinesis is present.  The Ejection Fraction is estimated at 20-25%.Traced Biplane LVEF is 23%.  Moderate-Severe functional Mitral Regurgitation. There is tenting of the  mitral leaflets with restriction of the posterior mitral leaflet. ERO is 0.3  cm^2. RV 43 ml.  Mild right ventricular dilation is present. Global right ventricular function  is normal.  Dilation of the inferior vena cava is present with normal respiratory  variation in diameter.    Chest x-ray: lungs clear, swan in right PA, balloon pump    Device settings DDDR   VT/VF ATP and defib, 188  monitor    ASSESSMENT:    62M h/o NICM (EF 20%) s/p Bi-V ICD, severe MR, VT, paroxsymal afib, DM, HTN, CKD, CECILIA, MDD, admitted for gradual shortness of breath, likely 2/2 to inadequate maintenance diuretics. Improving with BiPAP.       # NICM s/p CRT-D s/p HM2 LVAD as DT 6/20, s/p [id number]  in May  # Normal RV function prior to LVAD  # Severe functional MR    # Intubated for surgery    # Gout-left elbow    # hx of VT  # pAF  #HTN  #CKD-basline Cr 1.4  #CECILIA    Changes today  -balloon pump out  -urine output has been low, Cr improving. Got albumin this AM for CVP 8 but CVP 10-12 later in day so just observing Cr and UO now    PLAN:  -Goal MAP<90. Wean pressors as able.   - aspirin 81 and anticoagulation at discretion of CV surgery team  -optimize LVAD speed increased to 9000 today 6/20  -needs post VAD echo at some point to assess RV function    -work towards extubation as able. Wean nitric oxide      staffed with Dr. Ulises Gómez  General cards fellow, PGY4  Pager [phone]

## 2017-06-20 NOTE — PLAN OF CARE
Problem: Goal Outcome Summary  Goal: Goal Outcome Summary  OT 4E: Cancel - OT orders received and acknowledged. Pt now s/p LVAD and intubated/sedated on nitric and IABP. Will reschedule OT evaluation and initiate PROM if pt remains intubated/sedated.

## 2017-06-20 NOTE — BRIEF OP NOTE
[facility], [place]    Brief Operative Note    Pre-operative diagnosis: Heart Failure   Post-operative diagnosis * No post-op diagnosis entered *  Procedure: Procedure(s):  Heartmate II Left Ventricular Assist Device Insertion   Surgeon: Surgeon(s) and Role:     * Ronnie Quigley MD - Primary     * Corey Reddy MD - Assisting  Anesthesia: General   Estimated blood loss: 1000cc  Drains: Mediastinal x2, bilateral pleural  Specimens:   ID Type Source Tests Collected by Time Destination   A : [place] [facility] Heart SURGICAL PATHOLOGY EXAM Ronnie Quigley MD 6/19/2017  9:55 PM      Findings:   See op note.  Complications: None.  Implants: None.      HM II LVAD

## 2017-06-20 NOTE — ANESTHESIA PROCEDURE NOTES
Perioperative CHUN Report  Anesthesia Information  CHUN probe placed and report generated by: : JARVIS VALDES MEGAN JOY  Images for this study have been archived.     Echocardiogram Comments: PreCPB:  1.  Severely dilated LV with severely reduced function.  LVEF approximately 25%.  2.  Normal RV size with mildly reduced function.  3.  Trace AI.  4.  No PFO.    PostCPB:  1.  Severely dilated LV with LVAD inflow cannula in apex.  Ventricular septum appropriately bowing toward right.  2.  RV dilation immediately post CPB improved with Victor Hugo.  3.  AV opening with every beat. Trace AI.  4.  No PFO.        Preanesthesia Checklist:  Patient identified, IV assessed, risks and benefits discussed, monitors and equipment assessed, procedure being performed at surgeon's request and anesthesia consent obtained.  General Procedure Information  Modalities:  2D, 3D, CW Doppler, PW Doppler and Color flow mapping  Diagnostic indications for CHUN:   Non-ischemic cardiomyopathy                          .    Echocardiographic and Doppler Measurements  Right Ventricle:  Cavity size normal.   Global function mildly impaired.     Left Ventricle:  Cavity size dilated.   Global Function severely impaired.   Ejection Fraction 25%.      Ventricular Regional Function:  1- Basal Anteroseptal:  hypokinetic  2- Basal Anterior:  hypokinetic  3- Basal Anterolateral:  hypokinetic  4- Basal Inferolateral:  hypokinetic  5- Basal Inferior:  hypokinetic  6- Basal Inferoseptal:  hypokinetic  7- Mid Anteroseptal:  hypokinetic  8- Mid Anterior:  hypokinetic  9- Mid Anterolateral:  hypokinetic  10- Mid Inferolateral:  hypokinetic  11- Mid Inferior:  hypokinetic  12- Mid Inferoseptal:  hypokinetic  13- Apical Anterior:  hypokinetic  14- Apical Lateral:  hypokinetic  15- Apical Inferior:  hypokinetic  16- Apical Septal:  hypokinetic  17- [place]:  hypokinetic    Valves  Aortic Valve: Annulus normal.  Stenosis not present.  Regurgitation  absent.    Mitral Valve: Annulus normal.  Stenosis not present.  Regurgitation +1.    Tricuspid Valve: Annulus normal.  Stenosis not present.  Regurgitation +1.    Pulmonic Valve: Annulus normal.  Stenosis not present.  Regurgitation absent.      Aorta: Ascending Aorta: Size normal.  Dissection not present.  Plaque thickness less than 3 mm.  Mobile plaque not present.    Aortic Arch: Size normal.   Dissection not present.   Plaque thickness less than 3 mm.   Mobile plaque not present.    Descending Aorta: Size normal.   Dissection not present.   Plaque thickness less than 3 mm.   Mobile plaque not present.        Right Atrium:  Size dilated.   Spontaneous echo contrast not present.   Thrombus not present.    Left Atrium: Size dilated.  Spontaneous echo contrast not present.  Thrombus not present.  Left atrial appendage normal.     Atrial Septum: Intra-atrial septal morphology normal.     Ventricular Septum: Intra-ventricular septum morphology normal.     Diastolic Function Measurements:  Diastolic Dysfunction Grade= indeterminate. E= ms. A= ms. E/A Ratio= . DT=  ms.  S/D= .  IVRT= ms.  Other Findings:   Pericardium:  normal.  Pleural Effusion:  none. Pulmonary Arteries:  normal.  .  No coronary sinus catheter present.  .  .  Post Intervention Findings  Procedure(s) performed:  LVAD Placement. Global function:  Unchanged.   Regional wall motion:  Unchanged   Surgeon(s) notified of all postintervention findings:  Yes  .  .  .   .  .  .  .  .  .  LVAD Placement:  LV decompressed.  PFO not present.  Aortic valve opening.        Echocardiogram Comments  PreCPB:  1.  Severely dilated LV with severely reduced function.  LVEF approximately 25%.  2.  Normal RV size with mildly reduced function.  3.  Trace AI.  4.  No PFO.    PostCPB:  1.  Severely dilated LV with LVAD inflow cannula in apex.  Ventricular septum appropriately bowing toward right.  2.  RV dilation immediately post CPB improved with Victor Hugo.  3.  AV opening with  every beat. Trace AI.  4.  No PFO.

## 2017-06-20 NOTE — ANESTHESIA PREPROCEDURE EVALUATION
Anesthesia Evaluation     . Pt has had prior anesthetic. Type: General    No history of anesthetic complications          ROS/MED HX    ENT/Pulmonary: Comment: Previously on prednisone for COPD / asthma / gout; weaned off 7 weeks ago    (+)sleep apnea, asthma COPD, uses CPAP , . .    Neurologic:  - neg neurologic ROS     Cardiovascular:     (+) Dyslipidemia, hypertension----. Taking blood thinners : . CHF etiology: NICM Last EF: 20% NYHA classification: III. RESENDIZ, . :ICD . dysrhythmias (paroxysmal VT and AFib) .       METS/Exercise Tolerance:  1 - Eating, dressing   Hematologic:         Musculoskeletal: Comment: Gout        GI/Hepatic:         Renal/Genitourinary:     (+) chronic renal disease, type: CRI,       Endo:     (+) type II DM Obesity (h/o gastric bypass; BMI 39), .      Psychiatric:     (+) psychiatric history depression      Infectious Disease:         Malignancy:         Other:                     Physical Exam      Airway   Mallampati: I  TM distance: >3 FB  Neck ROM: full    Dental   (+) missing    Cardiovascular   Rhythm and rate: regular and normal      Pulmonary    breath sounds clear to auscultation                    Anesthesia Plan      History & Physical Review      ASA Status:  4 .    NPO Status:  > 8 hours    Plan for General and ETT with Intravenous induction. Maintenance will be Balanced.    PONV prophylaxis:  Ondansetron (or other 5HT-3) and Dexamethasone or Solumedrol  Additional equipment: 2nd IV, Arterial Line and CHUN (CVP, PAC and IABP in situ)      Postoperative Care  Postoperative pain management:  IV analgesics and Multi-modal analgesia.      Consents  Anesthetic plan, risks, benefits and alternatives discussed with:  Patient.  Use of blood products discussed: Yes.   Use of blood products discussed with Patient.  Consented to blood products.  .          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  Currently on coumadin for  anticoagulation.  ASA 4.  Plan for GETA with art line and CHUN to be placed in OR.  CVP, PAC and IABP in situ.  Discussed postop ICU admission with mechanical ventilation, possible need for blood transfusion with patient and family; all questions answered.    Rajani Padilla MD  Staff Anesthesiologist  Pager [phone]

## 2017-06-20 NOTE — PROGRESS NOTES
"CLINICAL NUTRITION SERVICES - ASSESSMENT NOTE     Nutrition Prescription    RECOMMENDATIONS FOR MDs/PROVIDERS TO ORDER:  If unable to extubate and advance diet in next 1-2 days, recommend place FT (by radiology) and initiate at least trophic TFs.    Malnutrition Status:    Patient does not meet two of the above criteria necessary for diagnosing malnutrition but is at risk for malnutrition    Future/Additional Recommendations:  1. If EN warranted, recommend FT placement by Radiology as contraindication exists for Cortrak placement with hx gastric bypass (enter XR Feeding Tube Placement order for Radiology to place).   Would recommend Impact Peptide @ goal 50 ml/hr (1200 ml/day) to provide 1880 kcals (23 kcal/kg/day), 135 g PRO (1.7 g/kg/day), 924 ml free H2O, 77 g Fat (50% from MCTs), 168 g CHO and no Fiber daily.  -Initiate @10 mL and advance by 10 mL q8hr as tolerated  -Do not start or advance until lytes (Mg++, K+) WNL and phos >1.9  -Recommend 30-60 mL q4hr fluid flushes for tube patency  Additional fluids and/or adjustments per MD  -Recommend order multivitamin/mineral (15 mL/day via FT) to help ensure micronutrient needs being met with suspected hypermetabolic demands and potential interruption to TF infusions.    2. Attempt LVAD diet education once appropriate.       REASON FOR ASSESSMENT  Jim Willingham is a/an 62 year old male assessed by the dietitian for Provider Order - \"Post Op Cardiovascular surgery.\"    NUTRITION HISTORY  Per review provider notes today, pt is s/p Bi-V ICD, severe MR, VT, paroxsymal afib, DM, HTN, CKD, CECILIA, MDD s/p LVAD placement on 6/19. Currently also with IABP in place. Plan to wean IABP ~12pm today, wean nitric following IABP removal.    Noted pt also with hx gastric bypass surgery and vitamin B12 deficiency.    Pt currently intubated on vent and sedated. Propofol currently at 26.9 ml/hr which provides 710 kcals daily. Per previous RD note during last admit, 5/24/17, pt's wife " "reported pt with excellent appetite.    MAPs - >60  FiO2 - 40%    CURRENT NUTRITION ORDERS  Diet: NPO  Intake/Tolerance: Pt previously on po diet (high fiber, 2gm Na, Low sat fat, no caffeine) 6/15-6/18 with % po at 4 meals documented per I/O flowsheet.    LABS  Labs reviewed  Cr - 1.76 (H)  K+, Mg++, PO4 - WNL  Lactic acid - 1.3, WNL  BG - last 3 readings: 145, 143, 177  Vit B12 - last checked 5/24,17: WNL    MEDICATIONS  Medications reviewed  Insulin gtt  Senna BID  D5W + 1/2 NS @ 30 ml/hr  Epinephrine  Vasopressin  Propofol - 26.9 ml/hr    ANTHROPOMETRICS  Height: 172.7 cm (5' 8\")  Most Recent Weight: 112.8 kg (248 lb 10.9 oz)    IBW: 70 kg  BMI: Obesity Grade II BMI 35-39.9  Weight History: Fluctuations in weight likely attributed to fluid and difference in scales used during previous admission.  Wt Readings from Last 15 Encounters:   06/20/17 112.8 kg (248 lb 10.9 oz)   06/13/17 118.5 kg (261 lb 3.9 oz)   06/08/17 116.1 kg (255 lb 14.4 oz)   05/26/17 117.3 kg (258 lb 8 oz)   05/12/17 119 kg (262 lb 4.8 oz)     Dosing Weight: 81 kg (adjusted using IBW and lowest wt 112 kg on 6/19)    ASSESSED NUTRITION NEEDS  Estimated Energy Needs: 1620 - 2025 kcals/day (20 - 25 kcals/kg)  Justification: Obese and Vented  Estimated Protein Needs: 122 - 162 grams protein/day (1.5 - 2 grams of pro/kg)  Justification: Increased needs s/p LVAD placement  Estimated Fluid Needs: (1 mL/kcal)   Justification: Maintenance or Per provider pending fluid status    PHYSICAL FINDINGS  See malnutrition section below.  No pressure wounds noted    MALNUTRITION  % Intake: Decreased intake does not meet criteria  % Weight Loss: Unable to assess d/t fluid changes  Subcutaneous Fat Loss: None observed  Muscle Loss: None observed  Fluid Accumulation/Edema: None noted per chart review  Malnutrition Diagnosis: Patient does not meet two of the above criteria necessary for diagnosing malnutrition but is at risk for malnutrition    NUTRITION " DIAGNOSIS  Inadequate protein-energy intake related to intubated on vent and sedated as evidenced by NPO status with no nutrition support at present.      INTERVENTIONS  Implementation  Nutrition Education: Not appropriate at this time due to patient condition     Goals  Diet adv v nutrition support within 1-2 days.     Monitoring/Evaluation  Progress toward goals will be monitored and evaluated per protocol.    Amanda Hernandez RD, LD  4E pager: [phone]

## 2017-06-20 NOTE — PLAN OF CARE
Problem: Goal Outcome Summary  Goal: Goal Outcome Summary  Outcome: Declining  T max 37.3, HR 70-90 SR, brief non-sustained VT runs. Pressors titrated to keep MAP >65.  Currently on 0.5 unit/hr Vasopressin and 0.02 mcg/kg/min Epinepherine.  IABP 1:1 in R groin.  Pace maker settings changed to DDD rate of 60 in OR.  ICD turned off.  CVP 5-10, PAP 20/8-32/18. CO 4.4-5.6, CI 2-3.5. SvO2 57-74.  -735. LVAD flow decreasing from 5.3-3.5, PI 5.1-7.4, Speed 8800, Power 4.6-5.2.  Mediastinal CT with total of 220 ml dark red output. Pleural CT with 124 ml serosanguinous output.  Vent settings CMV, rate 18, Vt 550, PEEP 6, 40% FiO2. On  20 ppm inhaled Nitric.  LS diminished.  On 30 mcg/kg/min Propofol and 50 mcg/hr Fentanyl for sedation.  Pupils 2mm and sluggish. Not following commands yet but moving hands and feet spontaneously. UOP low 15-42 ml/hr.  Total of 1250 ml Albumin and 1 unit PRBCs given since OR. Will continue current plan of care and update MDs with any changes.

## 2017-06-20 NOTE — PROGRESS NOTES
Procedure/Surgery Information   [facility], [place]     Intra-Aortic Balloon Pump Discontinuation  Date of Service (when I performed the procedure): 06/20/2017    Removed by: MD   Catheter saved: No. No complications      Recorded by Niranjan Joseph

## 2017-06-21 ENCOUNTER — APPOINTMENT (OUTPATIENT)
Dept: GENERAL RADIOLOGY | Facility: CLINIC | Age: 62
DRG: 001 | End: 2017-06-21
Attending: INTERNAL MEDICINE
Payer: COMMERCIAL

## 2017-06-21 ENCOUNTER — APPOINTMENT (OUTPATIENT)
Dept: OCCUPATIONAL THERAPY | Facility: CLINIC | Age: 62
DRG: 001 | End: 2017-06-21
Attending: INTERNAL MEDICINE
Payer: COMMERCIAL

## 2017-06-21 LAB
ANION GAP SERPL CALCULATED.3IONS-SCNC: 10 MMOL/L (ref 3–14)
ANION GAP SERPL CALCULATED.3IONS-SCNC: 9 MMOL/L (ref 3–14)
APTT PPP: 40 SEC (ref 22–37)
BASE DEFICIT BLDA-SCNC: 0.8 MMOL/L
BASE DEFICIT BLDA-SCNC: 2.3 MMOL/L
BASE DEFICIT BLDA-SCNC: 3.3 MMOL/L
BASE DEFICIT BLDA-SCNC: 4.8 MMOL/L
BASE EXCESS BLDV CALC-SCNC: 0 MMOL/L
BUN SERPL-MCNC: 28 MG/DL (ref 7–30)
BUN SERPL-MCNC: 29 MG/DL (ref 7–30)
CA-I BLD-MCNC: 4.9 MG/DL (ref 4.4–5.2)
CALCIUM SERPL-MCNC: 8.3 MG/DL (ref 8.5–10.1)
CALCIUM SERPL-MCNC: 8.8 MG/DL (ref 8.5–10.1)
CHLORIDE SERPL-SCNC: 108 MMOL/L (ref 94–109)
CHLORIDE SERPL-SCNC: 108 MMOL/L (ref 94–109)
CO2 SERPL-SCNC: 21 MMOL/L (ref 20–32)
CO2 SERPL-SCNC: 23 MMOL/L (ref 20–32)
COPATH REPORT: NORMAL
CREAT SERPL-MCNC: 1.48 MG/DL (ref 0.66–1.25)
CREAT SERPL-MCNC: 1.63 MG/DL (ref 0.66–1.25)
ERYTHROCYTE [DISTWIDTH] IN BLOOD BY AUTOMATED COUNT: 14.4 % (ref 10–15)
GFR SERPL CREATININE-BSD FRML MDRD: 43 ML/MIN/1.7M2
GFR SERPL CREATININE-BSD FRML MDRD: 48 ML/MIN/1.7M2
GLUCOSE BLDC GLUCOMTR-MCNC: 105 MG/DL (ref 70–99)
GLUCOSE BLDC GLUCOMTR-MCNC: 110 MG/DL (ref 70–99)
GLUCOSE BLDC GLUCOMTR-MCNC: 111 MG/DL (ref 70–99)
GLUCOSE BLDC GLUCOMTR-MCNC: 112 MG/DL (ref 70–99)
GLUCOSE BLDC GLUCOMTR-MCNC: 114 MG/DL (ref 70–99)
GLUCOSE BLDC GLUCOMTR-MCNC: 115 MG/DL (ref 70–99)
GLUCOSE BLDC GLUCOMTR-MCNC: 117 MG/DL (ref 70–99)
GLUCOSE BLDC GLUCOMTR-MCNC: 119 MG/DL (ref 70–99)
GLUCOSE BLDC GLUCOMTR-MCNC: 121 MG/DL (ref 70–99)
GLUCOSE BLDC GLUCOMTR-MCNC: 126 MG/DL (ref 70–99)
GLUCOSE SERPL-MCNC: 114 MG/DL (ref 70–99)
GLUCOSE SERPL-MCNC: 116 MG/DL (ref 70–99)
HCO3 BLD-SCNC: 20 MMOL/L (ref 21–28)
HCO3 BLD-SCNC: 22 MMOL/L (ref 21–28)
HCO3 BLD-SCNC: 23 MMOL/L (ref 21–28)
HCO3 BLD-SCNC: 24 MMOL/L (ref 21–28)
HCO3 BLDV-SCNC: 25 MMOL/L (ref 21–28)
HCT VFR BLD AUTO: 27.5 % (ref 40–53)
HGB BLD-MCNC: 8.8 G/DL (ref 13.3–17.7)
HGB BLD-MCNC: 9.5 G/DL (ref 13.3–17.7)
INR PPP: 1.43 (ref 0.86–1.14)
LMWH PPP CHRO-ACNC: 0.14 IU/ML
MAGNESIUM SERPL-MCNC: 2.4 MG/DL (ref 1.6–2.3)
MCH RBC QN AUTO: 29.4 PG (ref 26.5–33)
MCHC RBC AUTO-ENTMCNC: 32 G/DL (ref 31.5–36.5)
MCV RBC AUTO: 92 FL (ref 78–100)
O2/TOTAL GAS SETTING VFR VENT: 40 %
O2/TOTAL GAS SETTING VFR VENT: ABNORMAL %
O2/TOTAL GAS SETTING VFR VENT: NORMAL %
OXYHGB MFR BLD: 95 % (ref 92–100)
OXYHGB MFR BLD: 96 % (ref 92–100)
OXYHGB MFR BLDV: 60 %
PCO2 BLD: 33 MM HG (ref 35–45)
PCO2 BLD: 37 MM HG (ref 35–45)
PCO2 BLD: 41 MM HG (ref 35–45)
PCO2 BLD: 43 MM HG (ref 35–45)
PCO2 BLDV: 45 MM HG (ref 40–50)
PH BLD: 7.34 PH (ref 7.35–7.45)
PH BLD: 7.37 PH (ref 7.35–7.45)
PH BLD: 7.38 PH (ref 7.35–7.45)
PH BLD: 7.39 PH (ref 7.35–7.45)
PH BLDV: 7.36 PH (ref 7.32–7.43)
PHOSPHATE SERPL-MCNC: 4 MG/DL (ref 2.5–4.5)
PLATELET # BLD AUTO: 104 10E9/L (ref 150–450)
PLATELET # BLD AUTO: 115 10E9/L (ref 150–450)
PO2 BLD: 114 MM HG (ref 80–105)
PO2 BLD: 124 MM HG (ref 80–105)
PO2 BLD: 136 MM HG (ref 80–105)
PO2 BLD: 137 MM HG (ref 80–105)
PO2 BLDV: 36 MM HG (ref 25–47)
POTASSIUM BLD-SCNC: 4.3 MMOL/L (ref 3.4–5.3)
POTASSIUM SERPL-SCNC: 4 MMOL/L (ref 3.4–5.3)
POTASSIUM SERPL-SCNC: 4.2 MMOL/L (ref 3.4–5.3)
RBC # BLD AUTO: 2.99 10E12/L (ref 4.4–5.9)
SODIUM SERPL-SCNC: 138 MMOL/L (ref 133–144)
SODIUM SERPL-SCNC: 141 MMOL/L (ref 133–144)
WBC # BLD AUTO: 11.8 10E9/L (ref 4–11)

## 2017-06-21 PROCEDURE — 93005 ELECTROCARDIOGRAM TRACING: CPT

## 2017-06-21 PROCEDURE — 94003 VENT MGMT INPAT SUBQ DAY: CPT

## 2017-06-21 PROCEDURE — 25000128 H RX IP 250 OP 636: Performed by: THORACIC SURGERY (CARDIOTHORACIC VASCULAR SURGERY)

## 2017-06-21 PROCEDURE — 85049 AUTOMATED PLATELET COUNT: CPT | Performed by: SURGERY

## 2017-06-21 PROCEDURE — 85520 HEPARIN ASSAY: CPT | Performed by: SURGERY

## 2017-06-21 PROCEDURE — 84100 ASSAY OF PHOSPHORUS: CPT | Performed by: THORACIC SURGERY (CARDIOTHORACIC VASCULAR SURGERY)

## 2017-06-21 PROCEDURE — 25000125 ZZHC RX 250: Performed by: THORACIC SURGERY (CARDIOTHORACIC VASCULAR SURGERY)

## 2017-06-21 PROCEDURE — 85018 HEMOGLOBIN: CPT | Performed by: SURGERY

## 2017-06-21 PROCEDURE — 25000128 H RX IP 250 OP 636: Performed by: STUDENT IN AN ORGANIZED HEALTH CARE EDUCATION/TRAINING PROGRAM

## 2017-06-21 PROCEDURE — 80048 BASIC METABOLIC PNL TOTAL CA: CPT | Performed by: THORACIC SURGERY (CARDIOTHORACIC VASCULAR SURGERY)

## 2017-06-21 PROCEDURE — 93010 ELECTROCARDIOGRAM REPORT: CPT | Performed by: INTERNAL MEDICINE

## 2017-06-21 PROCEDURE — 99232 SBSQ HOSP IP/OBS MODERATE 35: CPT | Mod: GC | Performed by: INTERNAL MEDICINE

## 2017-06-21 PROCEDURE — 97110 THERAPEUTIC EXERCISES: CPT | Mod: GO | Performed by: OCCUPATIONAL THERAPIST

## 2017-06-21 PROCEDURE — 40000133 ZZH STATISTIC OT WARD VISIT: Performed by: OCCUPATIONAL THERAPIST

## 2017-06-21 PROCEDURE — 40000048 ZZH STATISTIC DAILY SWAN MONITORING

## 2017-06-21 PROCEDURE — 25000128 H RX IP 250 OP 636: Performed by: SURGERY

## 2017-06-21 PROCEDURE — 85027 COMPLETE CBC AUTOMATED: CPT | Performed by: THORACIC SURGERY (CARDIOTHORACIC VASCULAR SURGERY)

## 2017-06-21 PROCEDURE — 25000125 ZZHC RX 250: Performed by: SURGERY

## 2017-06-21 PROCEDURE — 25000128 H RX IP 250 OP 636

## 2017-06-21 PROCEDURE — 25000132 ZZH RX MED GY IP 250 OP 250 PS 637: Performed by: SURGERY

## 2017-06-21 PROCEDURE — 71010 XR CHEST PORT 1 VW: CPT

## 2017-06-21 PROCEDURE — 83735 ASSAY OF MAGNESIUM: CPT | Performed by: THORACIC SURGERY (CARDIOTHORACIC VASCULAR SURGERY)

## 2017-06-21 PROCEDURE — 80048 BASIC METABOLIC PNL TOTAL CA: CPT | Performed by: STUDENT IN AN ORGANIZED HEALTH CARE EDUCATION/TRAINING PROGRAM

## 2017-06-21 PROCEDURE — 84132 ASSAY OF SERUM POTASSIUM: CPT | Performed by: SURGERY

## 2017-06-21 PROCEDURE — 97535 SELF CARE MNGMENT TRAINING: CPT | Mod: GO | Performed by: OCCUPATIONAL THERAPIST

## 2017-06-21 PROCEDURE — 97165 OT EVAL LOW COMPLEX 30 MIN: CPT | Mod: GO | Performed by: OCCUPATIONAL THERAPIST

## 2017-06-21 PROCEDURE — 85730 THROMBOPLASTIN TIME PARTIAL: CPT | Performed by: THORACIC SURGERY (CARDIOTHORACIC VASCULAR SURGERY)

## 2017-06-21 PROCEDURE — 82805 BLOOD GASES W/O2 SATURATION: CPT | Performed by: THORACIC SURGERY (CARDIOTHORACIC VASCULAR SURGERY)

## 2017-06-21 PROCEDURE — 40000014 ZZH STATISTIC ARTERIAL MONITORING DAILY

## 2017-06-21 PROCEDURE — 25000132 ZZH RX MED GY IP 250 OP 250 PS 637: Performed by: STUDENT IN AN ORGANIZED HEALTH CARE EDUCATION/TRAINING PROGRAM

## 2017-06-21 PROCEDURE — 25000132 ZZH RX MED GY IP 250 OP 250 PS 637: Performed by: THORACIC SURGERY (CARDIOTHORACIC VASCULAR SURGERY)

## 2017-06-21 PROCEDURE — 00000146 ZZHCL STATISTIC GLUCOSE BY METER IP

## 2017-06-21 PROCEDURE — 82805 BLOOD GASES W/O2 SATURATION: CPT | Performed by: SURGERY

## 2017-06-21 PROCEDURE — 94640 AIRWAY INHALATION TREATMENT: CPT | Mod: 76

## 2017-06-21 PROCEDURE — 20000004 ZZH R&B ICU UMMC

## 2017-06-21 PROCEDURE — 40000196 ZZH STATISTIC RAPCV CVP MONITORING

## 2017-06-21 PROCEDURE — 85610 PROTHROMBIN TIME: CPT | Performed by: THORACIC SURGERY (CARDIOTHORACIC VASCULAR SURGERY)

## 2017-06-21 PROCEDURE — 40000275 ZZH STATISTIC RCP TIME EA 10 MIN

## 2017-06-21 PROCEDURE — 25000125 ZZHC RX 250: Performed by: NEUROLOGICAL SURGERY

## 2017-06-21 PROCEDURE — 82330 ASSAY OF CALCIUM: CPT | Performed by: THORACIC SURGERY (CARDIOTHORACIC VASCULAR SURGERY)

## 2017-06-21 RX ORDER — HEPARIN SODIUM 10000 [USP'U]/100ML
0-3500 INJECTION, SOLUTION INTRAVENOUS CONTINUOUS
Status: DISCONTINUED | OUTPATIENT
Start: 2017-06-21 | End: 2017-06-25

## 2017-06-21 RX ORDER — ASPIRIN 81 MG/1
81 TABLET, CHEWABLE ORAL DAILY
Status: DISCONTINUED | OUTPATIENT
Start: 2017-06-21 | End: 2017-06-30 | Stop reason: HOSPADM

## 2017-06-21 RX ORDER — FUROSEMIDE 10 MG/ML
80 INJECTION INTRAMUSCULAR; INTRAVENOUS ONCE
Status: COMPLETED | OUTPATIENT
Start: 2017-06-21 | End: 2017-06-21

## 2017-06-21 RX ORDER — HYDROMORPHONE HYDROCHLORIDE 1 MG/ML
.3-.5 INJECTION, SOLUTION INTRAMUSCULAR; INTRAVENOUS; SUBCUTANEOUS
Status: DISCONTINUED | OUTPATIENT
Start: 2017-06-21 | End: 2017-06-22

## 2017-06-21 RX ORDER — FUROSEMIDE 10 MG/ML
60 INJECTION INTRAMUSCULAR; INTRAVENOUS ONCE
Status: COMPLETED | OUTPATIENT
Start: 2017-06-21 | End: 2017-06-21

## 2017-06-21 RX ORDER — OXYCODONE HYDROCHLORIDE 5 MG/1
5-10 TABLET ORAL EVERY 4 HOURS PRN
Status: DISCONTINUED | OUTPATIENT
Start: 2017-06-21 | End: 2017-06-23

## 2017-06-21 RX ORDER — TRAZODONE HYDROCHLORIDE 50 MG/1
50 TABLET, FILM COATED ORAL AT BEDTIME
Status: DISCONTINUED | OUTPATIENT
Start: 2017-06-21 | End: 2017-06-30 | Stop reason: HOSPADM

## 2017-06-21 RX ORDER — FUROSEMIDE 20 MG
20 TABLET ORAL 2 TIMES DAILY
Status: DISCONTINUED | OUTPATIENT
Start: 2017-06-21 | End: 2017-06-21

## 2017-06-21 RX ORDER — FUROSEMIDE 10 MG/ML
80 INJECTION INTRAMUSCULAR; INTRAVENOUS ONCE
Status: DISCONTINUED | OUTPATIENT
Start: 2017-06-21 | End: 2017-06-21

## 2017-06-21 RX ORDER — IPRATROPIUM BROMIDE AND ALBUTEROL SULFATE 2.5; .5 MG/3ML; MG/3ML
3 SOLUTION RESPIRATORY (INHALATION) EVERY 6 HOURS
Status: DISCONTINUED | OUTPATIENT
Start: 2017-06-21 | End: 2017-06-26

## 2017-06-21 RX ORDER — FUROSEMIDE 10 MG/ML
INJECTION INTRAMUSCULAR; INTRAVENOUS
Status: COMPLETED
Start: 2017-06-21 | End: 2017-06-21

## 2017-06-21 RX ADMIN — HUMAN INSULIN 2 UNITS/HR: 100 INJECTION, SOLUTION SUBCUTANEOUS at 02:25

## 2017-06-21 RX ADMIN — SENNOSIDES AND DOCUSATE SODIUM 2 TABLET: 8.6; 5 TABLET ORAL at 19:51

## 2017-06-21 RX ADMIN — MUPIROCIN 1 G: 20 OINTMENT TOPICAL at 09:50

## 2017-06-21 RX ADMIN — FUROSEMIDE 60 MG: 10 INJECTION INTRAMUSCULAR; INTRAVENOUS at 09:31

## 2017-06-21 RX ADMIN — OXYCODONE HYDROCHLORIDE 10 MG: 5 TABLET ORAL at 17:44

## 2017-06-21 RX ADMIN — HYDROMORPHONE HYDROCHLORIDE 0.5 MG: 1 INJECTION, SOLUTION INTRAMUSCULAR; INTRAVENOUS; SUBCUTANEOUS at 07:57

## 2017-06-21 RX ADMIN — HYDROMORPHONE HYDROCHLORIDE 0.5 MG: 1 INJECTION, SOLUTION INTRAMUSCULAR; INTRAVENOUS; SUBCUTANEOUS at 20:44

## 2017-06-21 RX ADMIN — PROPOFOL 40 MCG/KG/MIN: 10 INJECTION, EMULSION INTRAVENOUS at 00:35

## 2017-06-21 RX ADMIN — FENTANYL CITRATE 50 MCG: 50 INJECTION, SOLUTION INTRAMUSCULAR; INTRAVENOUS at 06:04

## 2017-06-21 RX ADMIN — PROPOFOL 40 MCG/KG/MIN: 10 INJECTION, EMULSION INTRAVENOUS at 04:14

## 2017-06-21 RX ADMIN — FUROSEMIDE 80 MG: 10 INJECTION, SOLUTION INTRAVENOUS at 23:17

## 2017-06-21 RX ADMIN — OXYCODONE HYDROCHLORIDE 10 MG: 5 TABLET ORAL at 13:22

## 2017-06-21 RX ADMIN — IPRATROPIUM BROMIDE AND ALBUTEROL SULFATE 3 ML: .5; 3 SOLUTION RESPIRATORY (INHALATION) at 16:25

## 2017-06-21 RX ADMIN — HYDROMORPHONE HYDROCHLORIDE 0.5 MG: 1 INJECTION, SOLUTION INTRAMUSCULAR; INTRAVENOUS; SUBCUTANEOUS at 16:13

## 2017-06-21 RX ADMIN — LEVOFLOXACIN 500 MG: 5 INJECTION, SOLUTION INTRAVENOUS at 07:35

## 2017-06-21 RX ADMIN — FENTANYL CITRATE 50 MCG/HR: 50 INJECTION, SOLUTION INTRAMUSCULAR; INTRAVENOUS at 04:27

## 2017-06-21 RX ADMIN — ASPIRIN 81 MG CHEWABLE TABLET 81 MG: 81 TABLET CHEWABLE at 09:52

## 2017-06-21 RX ADMIN — ALBUTEROL SULFATE 6 PUFF: 90 AEROSOL, METERED RESPIRATORY (INHALATION) at 06:21

## 2017-06-21 RX ADMIN — OXYCODONE HYDROCHLORIDE 10 MG: 5 TABLET ORAL at 08:28

## 2017-06-21 RX ADMIN — IPRATROPIUM BROMIDE AND ALBUTEROL SULFATE 3 ML: .5; 3 SOLUTION RESPIRATORY (INHALATION) at 19:19

## 2017-06-21 RX ADMIN — PANTOPRAZOLE SODIUM 40 MG: 40 INJECTION, POWDER, FOR SOLUTION INTRAVENOUS at 07:35

## 2017-06-21 RX ADMIN — FENTANYL CITRATE 50 MCG: 50 INJECTION, SOLUTION INTRAMUSCULAR; INTRAVENOUS at 06:54

## 2017-06-21 RX ADMIN — OXYCODONE HYDROCHLORIDE 10 MG: 5 TABLET ORAL at 22:11

## 2017-06-21 RX ADMIN — POTASSIUM CHLORIDE 20 MEQ: 29.8 INJECTION, SOLUTION INTRAVENOUS at 20:58

## 2017-06-21 RX ADMIN — ACETAMINOPHEN 650 MG: 325 TABLET, FILM COATED ORAL at 08:33

## 2017-06-21 RX ADMIN — FUROSEMIDE 60 MG: 10 INJECTION, SOLUTION INTRAVENOUS at 09:31

## 2017-06-21 RX ADMIN — SODIUM CHLORIDE 600 MG: 9 INJECTION, SOLUTION INTRAVENOUS at 09:39

## 2017-06-21 RX ADMIN — HYDROMORPHONE HYDROCHLORIDE 0.5 MG: 1 INJECTION, SOLUTION INTRAMUSCULAR; INTRAVENOUS; SUBCUTANEOUS at 12:00

## 2017-06-21 RX ADMIN — HYDROMORPHONE HYDROCHLORIDE 0.5 MG: 1 INJECTION, SOLUTION INTRAMUSCULAR; INTRAVENOUS; SUBCUTANEOUS at 10:56

## 2017-06-21 RX ADMIN — HYDROMORPHONE HYDROCHLORIDE 0.5 MG: 1 INJECTION, SOLUTION INTRAMUSCULAR; INTRAVENOUS; SUBCUTANEOUS at 22:21

## 2017-06-21 RX ADMIN — IPRATROPIUM BROMIDE AND ALBUTEROL SULFATE 3 ML: .5; 3 SOLUTION RESPIRATORY (INHALATION) at 10:46

## 2017-06-21 RX ADMIN — HYDROMORPHONE HYDROCHLORIDE 0.5 MG: 1 INJECTION, SOLUTION INTRAMUSCULAR; INTRAVENOUS; SUBCUTANEOUS at 15:15

## 2017-06-21 RX ADMIN — FENTANYL CITRATE 50 MCG: 50 INJECTION, SOLUTION INTRAMUSCULAR; INTRAVENOUS at 04:28

## 2017-06-21 RX ADMIN — FLUCONAZOLE 200 MG: 200 INJECTION, SOLUTION INTRAVENOUS at 08:38

## 2017-06-21 RX ADMIN — FUROSEMIDE 80 MG: 10 INJECTION, SOLUTION INTRAVENOUS at 15:44

## 2017-06-21 RX ADMIN — VANCOMYCIN HYDROCHLORIDE 1500 MG: 10 INJECTION, POWDER, LYOPHILIZED, FOR SOLUTION INTRAVENOUS at 10:00

## 2017-06-21 RX ADMIN — WARFARIN SODIUM 7.5 MG: 2.5 TABLET ORAL at 19:27

## 2017-06-21 RX ADMIN — HYDROMORPHONE HYDROCHLORIDE 0.5 MG: 1 INJECTION, SOLUTION INTRAMUSCULAR; INTRAVENOUS; SUBCUTANEOUS at 09:48

## 2017-06-21 RX ADMIN — ALBUTEROL SULFATE 6 PUFF: 90 AEROSOL, METERED RESPIRATORY (INHALATION) at 07:51

## 2017-06-21 RX ADMIN — HYDROMORPHONE HYDROCHLORIDE 0.5 MG: 1 INJECTION, SOLUTION INTRAMUSCULAR; INTRAVENOUS; SUBCUTANEOUS at 19:27

## 2017-06-21 RX ADMIN — ACETAMINOPHEN 650 MG: 325 TABLET, FILM COATED ORAL at 17:47

## 2017-06-21 RX ADMIN — TRAZODONE HYDROCHLORIDE 50 MG: 50 TABLET ORAL at 23:17

## 2017-06-21 RX ADMIN — ACETAMINOPHEN 650 MG: 325 TABLET, FILM COATED ORAL at 22:11

## 2017-06-21 RX ADMIN — HEPARIN SODIUM 1200 UNITS/HR: 10000 INJECTION, SOLUTION INTRAVENOUS at 09:40

## 2017-06-21 ASSESSMENT — PAIN DESCRIPTION - DESCRIPTORS
DESCRIPTORS: ACHING;SHARP;SORE
DESCRIPTORS: ACHING

## 2017-06-21 ASSESSMENT — ACTIVITIES OF DAILY LIVING (ADL): PREVIOUS_RESPONSIBILITIES: MEAL PREP;HOUSEKEEPING;LAUNDRY;SHOPPING;MEDICATION MANAGEMENT;DRIVING;CHILD CARE

## 2017-06-21 NOTE — PLAN OF CARE
Problem: Goal Outcome Summary  Goal: Goal Outcome Summary  Outcome: Improving  D/I: Hemodynamics stable: CVP 8-12; PAP 28/18; CI 2.1-2.8; SVO2 58-62%. Freq short, non-sustained runs of VT (5 - 10 beats) this am; episodes decreased in pm. Good oxygenation. NO weaned.  Albumin 5%  250cc given this am for CVP 8 and marginal UV. Vaso and Epi gtts weaned slightly. MAPs 70s. LVAD speed increased to 9000 by MD. Flows 4s; PI 6s-7s.  IABP d/tonio by MD @ 16:45, w/ hemostasis @ 17:00. Tolerated well, maintaining good B/P and slight decrease in PI from 7.5 -> 6.6.  Light sedation w/ Propofol/ Fentanyl gtts. Able to HEAD. This am nodded appropriately to questions. Wife here and updated.   P: continue to monitor hemodynamics closely. Wean off NO overnoc. Plan vent PST in am, w/ plans for extubation, if remains stable.

## 2017-06-21 NOTE — PLAN OF CARE
Problem: Goal Outcome Summary  Goal: Goal Outcome Summary  Outcome: Improving  D/I: VSS. Afeb. Hemodynamics stable. LVAD flows/ PI WNL. Extubated @ 07:50 after successful vent PST. Tolerated well. Good oxygenation on 3L NC.  Strong productive cough. Using IS w/ encouragement. Up to chair w/ assist of 2 - tolerated well. Taking clear liquids liberally. Lasix IV x2 today w/ moderate urine response. Awake and alert. Incisional/ CT site pain fairly well controlled w/ Oxycodone po and Dilaudid IV for breakthrough pain. Minimal CT output.   P: continue to monitor closely. Encourage increased activity and good pulm toilet. Assess effectiveness of current pain meds.

## 2017-06-21 NOTE — PROGRESS NOTES
06/21/17 1500   Quick Adds   Type of Visit Initial Occupational Therapy Evaluation   Living Environment   Lives With grandchild(lidia);spouse   Living Arrangements house   Transportation Available family or friend will provide   Living Environment Comment Per pt he lives with his wife and great grandaujulieth in a house.  Has been very limited and spending most of his time in the living room due to RESENDIZ.    Self-Care   Dominant Hand right   Usual Activity Tolerance fair   Current Activity Tolerance fair   Regular Exercise no   Activity/Exercise/Self-Care Comment Has been very limited in activity. 0   Functional Level Prior   Ambulation 0-->independent   Transferring 0-->independent   Toileting 0-->independent   Bathing 0-->independent   Cognition 0 - no cognition issues reported   Which of the above functional risks had a recent onset or change? ambulation;transferring;toileting;bathing;dressing;cognition   General Information   Onset of Illness/Injury or Date of Surgery - Date 06/15/17   Referring Physician Dr Reddy   Patient/Family Goals Statement To be able to walk to the park with his chiki, 1 1/2 blocks   Additional Occupational Profile Info/Pertinent History of Current Problem 62M with NICM (EF 20%) s/p Bi-V ICD, severe MR, VT, paroxsymal afib, DM, HTN, CKD, CECILIA, MDD s/p LVAD placement on 6/19. IABP out 6/20.   Precautions/Limitations sternal precautions   General Observations Pt and wife present, pleasant and agreeable.     General Info Comments activity: ambulate TID   Cognitive Status Examination   Orientation orientation to person, place and time   Level of Consciousness alert   Cognitive Comment slight deficits reported, normally very sharp per wife   Visual Perception   Visual Perception Wears glasses   Visual Perception Comments reading glasses   Sensory Examination   Sensory Comments numbness in finger tips due to thoracic outlet syndrome   Pain Assessment   Patient Currently in Pain Yes, see  Vital Sign flowsheet   Integumentary/Edema   Integumentary/Edema no deficits were identifed   Posture   Posture not impaired   Range of Motion (ROM)   ROM Comment limited due to sternal precautions and pain   Strength   Strength Comments below baseline, has had great deconditioning since January   Muscle Tone Assessment   Muscle Tone Comments intact   Coordination   Fine Motor Coordination WFL for ADLs   Mobility   Bed Mobility Comments with RN, needed Ax2   Lower Body Dressing   Level of Ness: Dress Lower Body maximum assist (25% patients effort)   Instrumental Activities of Daily Living (IADL)   Previous Responsibilities meal prep;housekeeping;laundry;shopping;medication management;driving;   Activities of Daily Living Analysis   Impairments Contributing to Impaired Activities of Daily Living balance impaired;cognition impaired;coordination impaired;pain;post surgical precautions;ROM decreased;strength decreased   General Therapy Interventions   Planned Therapy Interventions ADL retraining;IADL retraining;home program guidelines;progressive activity/exercise;risk factor education;strengthening   Clinical Impression   Criteria for Skilled Therapeutic Interventions Met yes, treatment indicated   OT Diagnosis post op   Influenced by the following impairments post op precautions and pain, deconditioning,    Assessment of Occupational Performance 3-5 Performance Deficits   Identified Performance Deficits all IADLs, dressing, home management, c hild care, showeriing   Clinical Decision Making (Complexity) Low complexity   Therapy Frequency daily   Predicted Duration of Therapy Intervention (days/wks) 2 weeks   Anticipated Discharge Disposition Transitional Care Facility   Risks and Benefits of Treatment have been explained. Yes   Patient, Family & other staff in agreement with plan of care Yes   Clinical Impression Comments Pt has new LVAD, may need TCU at [facility].    Total Evaluation Time   Total  Evaluation Time (Minutes) 10

## 2017-06-21 NOTE — PLAN OF CARE
Problem: Goal Outcome Summary  Goal: Goal Outcome Summary  Outcome: Improving  T max 38.3 HR 70-90 SR, brief non-sustained VT runs with suctioning Pressors titrated off during shift with MAPs >65.  CVP 12-15, PAP32/18-38/24. CO 4.6-7.2, CI 2-3.2.1-3.1 SvO2 58-64.  -1104. LVAD flow 4.5-5.3, PI 5.5-6.4, Speed 9000, Power 5.1-5.4.  Mediastinal CT with 10-40ml/hr output. Pleural CT with 0-30 ml/hr output.  Vent settings CMV, rate 16, Vt 500, PEEP 5, 40% FiO2. Inhaled Nitric weaned off at 0300.  LS diminished. On 40 mcg/kg/min Propofol and 50 mcg/hr Fentanyl for sedation.  Pupils 2mm and sluggish. Following commands and moving all extremities. UOP 20-55 ml/hr. Insulin drip at 1-2 units/hr.  Plan to pressure support this morning and possibly extubate today. Will continue current plan of care and update MDs with any changes.

## 2017-06-21 NOTE — PLAN OF CARE
Problem: Restraint for Non-Violent/Non-Self-Destructive Behavior  Goal: Prevent/Manage Potential Problems  Maintain safety of patient and others during period of restraint.  Promote psychological and physical wellbeing.  Prevent injury to skin and involved body parts.  Promote nutrition, hydration, and elimination.   Outcome: No Change  D/I: Pt lightly sedated post-op LVAD placement. Intubated and lightly sedated. BUE soft restraints applied this am, when pt became restless and appeared to reach for ETT. Wife here and informed. MD order obtained.

## 2017-06-21 NOTE — PROGRESS NOTES
D: Stopped by to see patient. Patient was sitting up in chair, extubated, reports feeling well. Made plan to check with patient tomorrow afternoon to possibly start education. Gave LVAD manual to patient's wife. Provided support and listened to patient and care giver's thoughts and concerns.  P: Continue to follow patient and address any questions or concerns patient and or caregiver may have.

## 2017-06-21 NOTE — PHARMACY-ANTICOAGULATION SERVICE
Clinical Pharmacy - Warfarin Dosing Consult     Pharmacy has been consulted to manage this patient s warfarin therapy.  Indication: LVAD/RVAD  Therapy Goal: INR 2-3  OP Anticoag Clinic: [facility]  Warfarin Prior to Admission: Yes  Warfarin PTA Regimen: warfarin 10 mg MWF, 7.5 mg all other days of the week of week   Significant drug interactions: Heparin, aspirin and citalopram may all increase bleeding risk in combination with warfarin. If home allopurinol is resumed, this may also enhance warfarin effects and dose adjustment may be required  Recent documented change in oral intake/nutrition: Yes (Recent surgery and NPO status)    INR   Date Value Ref Range Status   06/21/2017 1.43 (H) 0.86 - 1.14 Final   06/20/2017 1.49 (H) 0.86 - 1.14 Final     Recommend warfarin 7.5 mg today.  Pharmacy will monitor Jim Willingham daily and order warfarin doses to achieve specified goal.      Please contact pharmacy as soon as possible if the warfarin needs to be held for a procedure or if the warfarin goals change.      Tori Obrien, PharmD  PGY-2 Critical Care Pharmacy Resident

## 2017-06-21 NOTE — PLAN OF CARE
Problem: Goal Outcome Summary  Goal: Goal Outcome Summary  OT 4E  Orders received, evaluation complete, treatment initiated.  Pt extubated this am, up to chair with assist from RN.  Pt fatigues quickly, reports baseline activity tolerance has been poor since January.  Has goal of returning to prior level to be able to walk his great granddaughter to the park.  Educated pt re increasing exercise goals and sternal precautions.   Rec:  TCU to improve strength and activity tolerance for safe transfers and IND IADLs including

## 2017-06-21 NOTE — PROGRESS NOTES
CVICU Progress Note  06/21/2017    MAJOR CHANGE/PLAN:  -IABP removed, stable after  -Remove PA Catheter  -Plan to start Heparin today with LVAD parameters, INR 2-3  -Continue Diuresis    Subjective and Interval Events: Extubated this AM, doing well.    Objective:  Vitals:   Temp:  [98.8  F (37.1  C)-101.3  F (38.5  C)] 100.9  F (38.3  C)  Heart Rate:  [69-95] 95  Resp:  [14-20] 20  MAP:  [68 mmHg-91 mmHg] 76 mmHg  Arterial Line BP: ()/(49-86) 82/70  FiO2 (%):  [40 %] 40 %  SpO2:  [95 %-100 %] 96 %    Ventilation Mode: CPAP/PS  FiO2 (%): 40 %  Rate Set (breaths/minute): 16 breaths/min  Tidal Volume Set (mL): 500 mL  PEEP (cm H2O): 5 cmH2O  Pressure Support (cm H2O): 7 cmH2O  Oxygen Concentration (%): 40 %  Resp: 20    Intake/Output:   I/O last 3 completed shifts:  In: 2956.49 [I.V.:2331.49; NG/GT:75]  Out: 1833 [Urine:941; Emesis/NG output:75; Chest Tube:817]    Physical exam:  General: NAD, extubated in AM  Neuro: alert, verbal and interacting appropriately  Resp: On supplemental oxygen, no dyspnea  CV: RRR, LVAD, some chest discomfort  Abdomen: Soft, Non-distended, Non-tender  Extremities: warm and well perfused    Labs:    Recent Labs  Lab 06/21/17  0359 06/20/17  2203 06/20/17  1342   WBC 11.8*  --  13.0*   HGB 8.8* 8.3* 8.1*   *  --  117*   INR 1.43*  --  1.49*     --  140   POTASSIUM 4.2  --  4.4  4.4   BUN 28  --  28   CR 1.48*  --  1.57*       Assessment and Plan: 62M with NICM (EF 20%) s/p Bi-V ICD (6/20), severe MR, VT, paroxsymal afib, DM, HTN, CKD, CECILIA, MDD.     Neuro:  PRN oxy added, off sedation  CV:  Off pressors. LVAD parameters stable  Resp:  Wean as able, stable ABG.  GI: PPI  FEN:  MIVF, dysphagia study  :  Russell, strict I/O. Lower urine output, scheduled Lasix  Heme:  Monitor, stable after transfusion.  ID:  Fluconazole, Levaquin, Rifampin, Vanco  Imm: None indicated  Endo:  Insulin gtt, BS stable  PPx:  Monitor bleeding. Stable post IABP removal, Heparin drip  started  Lines: CVC (R IJ), Art line, CT (Pleural and Mediastinal), ETT  Dispo:  CVICU    Seen and discussed with Dr. Rodriguez.    Gage Tapia MD  Anesthesia Resident, CA-1

## 2017-06-21 NOTE — PROGRESS NOTES
CLINICAL NUTRITION SERVICES    Reason for Education:  Nutrition education regarding meal planning post-op LVAD placement    Diet History:  Pt has received formal nutrition education on meal planning post-op LVAD placement in the past (in May 2017). Pt continues to follow a low sodium diet at home.     Interventions:  Nutrition Education  1.  Provided instruction on the need to eat small, frequent meals.  Importance of adequate oral intake post-op, especially for 6-8 weeks, was discussed.  2.  Provided handout regarding the amounts of protein in various food items.  Discussed protein goals with pt.      Follow-up:    Patient to ask any further nutrition-related questions before discharge.  In addition, pt may request outpatient RD appointment.      Lacie Goodwin RD, LD, [facility]  CVICU Dietitian  Pager: [phone]

## 2017-06-21 NOTE — PROGRESS NOTES
Patient extubated 0745. Placed on 4 LPM NC SAT 95%, BS clear and diminished, scant secretions clear. Incentive Spirometer with RT assist, reached 750 ml. Please see flow sheets for further information    Perla Kowalski, RT Student

## 2017-06-21 NOTE — PROGRESS NOTES
CVICU Progress Note  06/21/2017    Subjective and Interval Events: extubated, doing well.    Objective:  Vitals:   Temp:  [98.8  F (37.1  C)-101.3  F (38.5  C)] 100.8  F (38.2  C)  Heart Rate:  [69-97] 94  Resp:  [14-20] 20  MAP:  [71 mmHg-91 mmHg] 77 mmHg  Arterial Line BP: ()/(52-86) 82/72  FiO2 (%):  [40 %] 40 %  SpO2:  [95 %-100 %] 100 %    Ventilation Mode: CPAP/PS  FiO2 (%): 40 %  Rate Set (breaths/minute): 16 breaths/min  Tidal Volume Set (mL): 500 mL  PEEP (cm H2O): 5 cmH2O  Pressure Support (cm H2O): 7 cmH2O  Oxygen Concentration (%): 40 %  Resp: 20    Intake/Output:   I/O last 3 completed shifts:  In: 2956.49 [I.V.:2331.49; NG/GT:75]  Out: 1833 [Urine:941; Emesis/NG output:75; Chest Tube:817]    Physical exam:  General: NAD  Neuro: sedated  Resp: extubated, NC  CV: RRR, LVAD  Abdomen: Soft, Non-distended, Non-tender  Extremities: warm and well perfused    Labs:    Recent Labs  Lab 06/21/17  0359 06/20/17  2203 06/20/17  1342   WBC 11.8*  --  13.0*   HGB 8.8* 8.3* 8.1*   *  --  117*   INR 1.43*  --  1.49*     --  140   POTASSIUM 4.2  --  4.4  4.4   BUN 28  --  28   CR 1.48*  --  1.57*       Assessment and Plan: 62M with NICM (EF 20%) s/p Bi-V ICD, severe MR, VT, paroxsymal afib, DM, HTN, CKD, CECILIA, MDD s/p LVAD placement on 6/19. IABP out 6/20.    Neuro:  prn tylenol, prn oxy, prn dilaudid  CV:  ASA 81, LVAD, off drips  Resp:  Extubated, wean O2 as tolerated  GI: PPI  FEN:  NPO, bedside swallow  :  Christopher, strict I/O, lasix 60  Heme:  Hgb stable; low intensity heparin gtt  ID:  Fluconazole, Levaquin, Rifampin, Vanc  Endo:  Insulin gtt, BS stable  PPx:  SCDs, PPI  Lines: CVC, a line, CTs, christopher, PIV, ETT  Dispo:  CVICU    Seen and discussed with Dr. Reddy.    Myke Sanchez MD PGY-3  Surgery Resident    Patient seen and examined. Start anticoagulation with heparin. Continue current management plan. Agree with the findings in the Resident's note. I spent a total of 35 minutes examining the  patient, reviewing investigations and therapeutic counseling.

## 2017-06-21 NOTE — PROGRESS NOTES
Cardiology Progress Note    Events and interval changes in past 24 hours:   No blood products overnight  No VAD alarms  IABP out yesterday.   Day before received 750 5% albumin and 1u p RBCs for low flow on VAD.   CPAP PS trial, tolerated well.       OBJECTIVE FINDINGS:  Temp: 101.3  F (38.5  C) Temp  Min: 98.8  F (37.1  C)  Max: 101.3  F (38.5  C)  Resp: 16 Resp  Min: 14  Max: 19  SpO2: 100 % SpO2  Min: 99 %  Max: 100 %    No Data Recorded  Heart Rate: 92 Heart Rate  Min: 69  Max: 92    No data recorded.  No data recorded.  cvp 12 pa 44/28/33 pa sat 66% ci 2.8 co 7.2 svr 800 hgb 10.1 bsa 2.3  cvp 9 pa 26/12/20 pa sat 67% CI 2.2 CO 6.5 Hgb 8.7 bsa 2.3       IABP  Out yesterday.               Gen: intubated, sedated   Resp: clear to auscultation bilaterally, no crackles or wheezing   CV: RRR, normal S1/S2, no S3/S4,holosystolic murmur at ape, no peripheral edema  Abd:soft, nontender,   IABP    Intake/Output Summary (Last 24 hours) at 06/19/17 0609  Last data filed at 06/19/17 0500      Vitals:    06/17/17 0400 06/18/17 0607 06/19/17 0500 06/20/17 0515   Weight: 113.1 kg (249 lb 4.8 oz) 112.5 kg (248 lb 1.6 oz) 112.1 kg (247 lb 2.2 oz) 112.8 kg (248 lb 10.9 oz)    06/21/17 0415   Weight: 114.5 kg (252 lb 6.8 oz)     Inhaled NO  Not on any pressor  Fentanyl 50, 0 propofol.     CMP  Recent Labs  Lab 06/21/17  0359 06/20/17  1342 06/20/17  1003 06/20/17  0439 06/20/17  0131  06/19/17  0340  06/15/17  1155    140  --  141 139  < > 137  < > 139   POTASSIUM 4.2 4.4  4.4 4.1 4.7 5.1  < > 4.0  < > 4.0   CHLORIDE 108 107  --  106 104  --  105  < > 103   CO2 23 26  --  24 24  --  26  < > 29   ANIONGAP 10 7  --  11 10  --  6  < > 7   * 124*  --  186* 186*  < > 92  < > 109*   BUN 28 28  --  26 25  --  24  < > 33*   CR 1.48* 1.57*  --  1.76* 1.49*  --  1.49*  < > 1.41*   GFRESTIMATED 48* 45*  --  40* 48*  --  48*  < > 51*   GFRESTBLACK 59* 55*  --  48* 58*  --  58*  < > 62   QAMAR 8.8 8.7  --  8.7 9.2  --  8.8   < > 9.0   MAG 2.4* 2.3  --  2.1 2.0  < > 2.3  < >  --    PHOS 4.0  --   --  3.1 4.0  --  3.0  --   --    PROTTOTAL  --   --   --   --   --   --   --   --  7.3   ALBUMIN  --   --   --   --   --   --   --   --  3.8   BILITOTAL  --   --   --   --   --   --   --   --  0.8   ALKPHOS  --   --   --   --   --   --   --   --  72   AST  --   --   --   --   --   --   --   --  23   ALT  --   --   --   --   --   --   --   --  26   < > = values in this interval not displayed.  CBC    Recent Labs  Lab 06/21/17 0359 06/20/17 2203 06/20/17 1342 06/20/17  1003 06/20/17  0439 06/20/17  0131   WBC 11.8*  --  13.0*  --  17.2* 20.5*   RBC 2.99*  --  2.80*  --  3.00* 3.45*   HGB 8.8* 8.3* 8.1* 8.4* 8.7* 10.2*   HCT 27.5*  --  25.5* 25.5* 27.5* 31.8*   MCV 92  --  91  --  92 92   MCH 29.4  --  28.9  --  29.0 29.6   MCHC 32.0  --  31.8  --  31.6 32.1   RDW 14.4  --  13.9  --  13.5 13.4   *  --  117*  --  149* 167     INR    Recent Labs  Lab 06/21/17 0359 06/20/17 1342 06/20/17  0439 06/20/17  0131   INR 1.43* 1.49* 1.44* 1.42*     Arterial Blood Gas    Recent Labs  Lab 06/21/17 0359 06/20/17 2204 06/20/17 1342 06/20/17  1003   PH 7.38 7.38 7.44 7.44   PCO2 41 41 36 34*   PO2 136* 159* 162* 159*   HCO3 24 24 25 23   O2PER 40%  40% 40%  40% 40 40  40         Imaging and other studies:  EKG: Vpaced rhythm  Echocardiogram: before LVAD 6/12  Severe left ventricular dilation is present. LVIDd 8.0 cm.  Severe diffuse hypokinesis is present. Posterior wall akinesis is present.  The Ejection Fraction is estimated at 20-25%.Traced Biplane LVEF is 23%.  Moderate-Severe functional Mitral Regurgitation. There is tenting of the  mitral leaflets with restriction of the posterior mitral leaflet. ERO is 0.3  cm^2. RV 43 ml.  Mild right ventricular dilation is present. Global right ventricular function  is normal.  Dilation of the inferior vena cava is present with normal respiratory  variation in diameter.        Device settings DDDR   VT/VF ATP and defib, 188  monitor    ASSESSMENT:    62M h/o NICM (EF 20%) s/p Bi-V ICD, severe MR, VT, paroxsymal afib, DM, HTN, CKD, CECILIA, MDD, admitted for gradual shortness of breath, likely 2/2 to inadequate maintenance diuretics. Improving with BiPAP.       # NICM s/p CRT-D s/p HM2 LVAD as DT 6/20, s/p [id number]  in May  # Normal RV function prior to LVAD  # Severe functional MR  # Intubated for surgery  # Gout-left elbow  # hx of VT  # pAF  #HTN  #CKD-basline Cr 1.4  #CECILIA    PLAN:  - Diurese with lasix IV boluses today to net negative of at least 1 L.   - Goal MAP<90. Not on pressors  - aspirin 81 and anticoagulation at discretion of CV surgery team  - optimize LVAD speed increased to 9000 today 6/20  - Physical therapy to mobilize patient    staffed with Dr. Ramiro Lilly MD  General cards fellow, PGY4  Pager [phone]    Attending Attestation:  Patient seen and examined by me with the team. I have performed all pertinent elements of the physical examination and reviewed the note above. I have reviewed pertinent laboratory, echocardiographic, imaging, and cardiac catheterization results. I agree with the plan of care as described in this note.    Alberto Rubio MD, PhD

## 2017-06-22 ENCOUNTER — APPOINTMENT (OUTPATIENT)
Dept: PHYSICAL THERAPY | Facility: CLINIC | Age: 62
DRG: 001 | End: 2017-06-22
Attending: INTERNAL MEDICINE
Payer: COMMERCIAL

## 2017-06-22 LAB
ANION GAP SERPL CALCULATED.3IONS-SCNC: 9 MMOL/L (ref 3–14)
APTT PPP: 86 SEC (ref 22–37)
BASE DEFICIT BLDA-SCNC: 1.8 MMOL/L
BASE DEFICIT BLDV-SCNC: 0.5 MMOL/L
BUN SERPL-MCNC: 34 MG/DL (ref 7–30)
CALCIUM SERPL-MCNC: 9.1 MG/DL (ref 8.5–10.1)
CHLORIDE SERPL-SCNC: 105 MMOL/L (ref 94–109)
CO2 SERPL-SCNC: 23 MMOL/L (ref 20–32)
CREAT SERPL-MCNC: 1.93 MG/DL (ref 0.66–1.25)
ERYTHROCYTE [DISTWIDTH] IN BLOOD BY AUTOMATED COUNT: 14.4 % (ref 10–15)
GFR SERPL CREATININE-BSD FRML MDRD: 36 ML/MIN/1.7M2
GLUCOSE BLDC GLUCOMTR-MCNC: 102 MG/DL (ref 70–99)
GLUCOSE BLDC GLUCOMTR-MCNC: 114 MG/DL (ref 70–99)
GLUCOSE BLDC GLUCOMTR-MCNC: 120 MG/DL (ref 70–99)
GLUCOSE BLDC GLUCOMTR-MCNC: 162 MG/DL (ref 70–99)
GLUCOSE BLDC GLUCOMTR-MCNC: 183 MG/DL (ref 70–99)
GLUCOSE BLDC GLUCOMTR-MCNC: 96 MG/DL (ref 70–99)
GLUCOSE SERPL-MCNC: 121 MG/DL (ref 70–99)
HCO3 BLD-SCNC: 24 MMOL/L (ref 21–28)
HCO3 BLDV-SCNC: 26 MMOL/L (ref 21–28)
HCT VFR BLD AUTO: 28.1 % (ref 40–53)
HGB BLD-MCNC: 8.9 G/DL (ref 13.3–17.7)
INR PPP: 1.44 (ref 0.86–1.14)
LACTATE BLD-SCNC: 0.9 MMOL/L (ref 0.7–2.1)
LMWH PPP CHRO-ACNC: 0.2 IU/ML
LMWH PPP CHRO-ACNC: 0.2 IU/ML
MAGNESIUM SERPL-MCNC: 2.1 MG/DL (ref 1.6–2.3)
MCH RBC QN AUTO: 29.3 PG (ref 26.5–33)
MCHC RBC AUTO-ENTMCNC: 31.7 G/DL (ref 31.5–36.5)
MCV RBC AUTO: 92 FL (ref 78–100)
O2/TOTAL GAS SETTING VFR VENT: NORMAL %
O2/TOTAL GAS SETTING VFR VENT: NORMAL %
OXYHGB MFR BLD: 93 % (ref 92–100)
OXYHGB MFR BLDV: 59 %
PCO2 BLD: 43 MM HG (ref 35–45)
PCO2 BLDV: 50 MM HG (ref 40–50)
PH BLD: 7.35 PH (ref 7.35–7.45)
PH BLDV: 7.32 PH (ref 7.32–7.43)
PHOSPHATE SERPL-MCNC: 4.3 MG/DL (ref 2.5–4.5)
PLATELET # BLD AUTO: 113 10E9/L (ref 150–450)
PO2 BLD: 84 MM HG (ref 80–105)
PO2 BLDV: 36 MM HG (ref 25–47)
POTASSIUM SERPL-SCNC: 4.2 MMOL/L (ref 3.4–5.3)
RBC # BLD AUTO: 3.04 10E12/L (ref 4.4–5.9)
SODIUM SERPL-SCNC: 138 MMOL/L (ref 133–144)
WBC # BLD AUTO: 13.1 10E9/L (ref 4–11)

## 2017-06-22 PROCEDURE — 97530 THERAPEUTIC ACTIVITIES: CPT | Mod: GP | Performed by: PHYSICAL THERAPIST

## 2017-06-22 PROCEDURE — 40000014 ZZH STATISTIC ARTERIAL MONITORING DAILY

## 2017-06-22 PROCEDURE — 94660 CPAP INITIATION&MGMT: CPT

## 2017-06-22 PROCEDURE — 25000128 H RX IP 250 OP 636: Performed by: STUDENT IN AN ORGANIZED HEALTH CARE EDUCATION/TRAINING PROGRAM

## 2017-06-22 PROCEDURE — 83605 ASSAY OF LACTIC ACID: CPT | Performed by: SURGERY

## 2017-06-22 PROCEDURE — 97162 PT EVAL MOD COMPLEX 30 MIN: CPT | Mod: GP | Performed by: PHYSICAL THERAPIST

## 2017-06-22 PROCEDURE — 40000275 ZZH STATISTIC RCP TIME EA 10 MIN

## 2017-06-22 PROCEDURE — 85520 HEPARIN ASSAY: CPT | Performed by: THORACIC SURGERY (CARDIOTHORACIC VASCULAR SURGERY)

## 2017-06-22 PROCEDURE — 25000132 ZZH RX MED GY IP 250 OP 250 PS 637: Performed by: THORACIC SURGERY (CARDIOTHORACIC VASCULAR SURGERY)

## 2017-06-22 PROCEDURE — 94640 AIRWAY INHALATION TREATMENT: CPT

## 2017-06-22 PROCEDURE — 82805 BLOOD GASES W/O2 SATURATION: CPT | Performed by: THORACIC SURGERY (CARDIOTHORACIC VASCULAR SURGERY)

## 2017-06-22 PROCEDURE — 80048 BASIC METABOLIC PNL TOTAL CA: CPT | Performed by: THORACIC SURGERY (CARDIOTHORACIC VASCULAR SURGERY)

## 2017-06-22 PROCEDURE — 84100 ASSAY OF PHOSPHORUS: CPT | Performed by: THORACIC SURGERY (CARDIOTHORACIC VASCULAR SURGERY)

## 2017-06-22 PROCEDURE — 25000128 H RX IP 250 OP 636: Performed by: SURGERY

## 2017-06-22 PROCEDURE — 85610 PROTHROMBIN TIME: CPT | Performed by: THORACIC SURGERY (CARDIOTHORACIC VASCULAR SURGERY)

## 2017-06-22 PROCEDURE — 40000809 ZZH STATISTIC NO DOCUMENTATION TO SUPPORT CHARGE

## 2017-06-22 PROCEDURE — 40000193 ZZH STATISTIC PT WARD VISIT: Performed by: PHYSICAL THERAPIST

## 2017-06-22 PROCEDURE — 25000131 ZZH RX MED GY IP 250 OP 636 PS 637: Performed by: THORACIC SURGERY (CARDIOTHORACIC VASCULAR SURGERY)

## 2017-06-22 PROCEDURE — 85730 THROMBOPLASTIN TIME PARTIAL: CPT | Performed by: THORACIC SURGERY (CARDIOTHORACIC VASCULAR SURGERY)

## 2017-06-22 PROCEDURE — 25000125 ZZHC RX 250: Performed by: THORACIC SURGERY (CARDIOTHORACIC VASCULAR SURGERY)

## 2017-06-22 PROCEDURE — 25000125 ZZHC RX 250: Performed by: SURGERY

## 2017-06-22 PROCEDURE — 94640 AIRWAY INHALATION TREATMENT: CPT | Mod: 76

## 2017-06-22 PROCEDURE — 25000132 ZZH RX MED GY IP 250 OP 250 PS 637: Performed by: SURGERY

## 2017-06-22 PROCEDURE — 00000146 ZZHCL STATISTIC GLUCOSE BY METER IP

## 2017-06-22 PROCEDURE — 36415 COLL VENOUS BLD VENIPUNCTURE: CPT | Performed by: SURGERY

## 2017-06-22 PROCEDURE — 25000132 ZZH RX MED GY IP 250 OP 250 PS 637: Performed by: STUDENT IN AN ORGANIZED HEALTH CARE EDUCATION/TRAINING PROGRAM

## 2017-06-22 PROCEDURE — 83735 ASSAY OF MAGNESIUM: CPT | Performed by: THORACIC SURGERY (CARDIOTHORACIC VASCULAR SURGERY)

## 2017-06-22 PROCEDURE — 21400006 ZZH R&B CCU INTERMEDIATE UMMC

## 2017-06-22 PROCEDURE — 99232 SBSQ HOSP IP/OBS MODERATE 35: CPT | Mod: GC | Performed by: INTERNAL MEDICINE

## 2017-06-22 PROCEDURE — 93005 ELECTROCARDIOGRAM TRACING: CPT

## 2017-06-22 PROCEDURE — 85027 COMPLETE CBC AUTOMATED: CPT | Performed by: THORACIC SURGERY (CARDIOTHORACIC VASCULAR SURGERY)

## 2017-06-22 PROCEDURE — 25000128 H RX IP 250 OP 636: Performed by: PHYSICIAN ASSISTANT

## 2017-06-22 PROCEDURE — 40000141 ZZH STATISTIC PERIPHERAL IV START W/O US GUIDANCE

## 2017-06-22 PROCEDURE — 93010 ELECTROCARDIOGRAM REPORT: CPT | Performed by: INTERNAL MEDICINE

## 2017-06-22 RX ORDER — CYCLOBENZAPRINE HCL 10 MG
10 TABLET ORAL 3 TIMES DAILY
Status: DISCONTINUED | OUTPATIENT
Start: 2017-06-22 | End: 2017-06-23

## 2017-06-22 RX ORDER — NICOTINE POLACRILEX 4 MG
15-30 LOZENGE BUCCAL
Status: DISCONTINUED | OUTPATIENT
Start: 2017-06-22 | End: 2017-06-30 | Stop reason: HOSPADM

## 2017-06-22 RX ORDER — ALBUTEROL SULFATE 90 UG/1
2 AEROSOL, METERED RESPIRATORY (INHALATION) EVERY 4 HOURS PRN
Status: DISCONTINUED | OUTPATIENT
Start: 2017-06-22 | End: 2017-06-30 | Stop reason: HOSPADM

## 2017-06-22 RX ORDER — PANTOPRAZOLE SODIUM 40 MG/1
40 TABLET, DELAYED RELEASE ORAL EVERY MORNING
Status: DISCONTINUED | OUTPATIENT
Start: 2017-06-23 | End: 2017-06-30 | Stop reason: HOSPADM

## 2017-06-22 RX ORDER — HYDROMORPHONE HYDROCHLORIDE 1 MG/ML
.3-.5 INJECTION, SOLUTION INTRAMUSCULAR; INTRAVENOUS; SUBCUTANEOUS
Status: DISCONTINUED | OUTPATIENT
Start: 2017-06-22 | End: 2017-06-26

## 2017-06-22 RX ORDER — DEXTROSE MONOHYDRATE 25 G/50ML
25-50 INJECTION, SOLUTION INTRAVENOUS
Status: DISCONTINUED | OUTPATIENT
Start: 2017-06-22 | End: 2017-06-30 | Stop reason: HOSPADM

## 2017-06-22 RX ADMIN — OXYCODONE HYDROCHLORIDE 10 MG: 5 TABLET ORAL at 19:12

## 2017-06-22 RX ADMIN — OXYCODONE HYDROCHLORIDE 5 MG: 5 TABLET ORAL at 15:04

## 2017-06-22 RX ADMIN — HUMAN INSULIN 1 UNITS/HR: 100 INJECTION, SOLUTION SUBCUTANEOUS at 06:01

## 2017-06-22 RX ADMIN — HEPARIN SODIUM 1300 UNITS/HR: 10000 INJECTION, SOLUTION INTRAVENOUS at 04:26

## 2017-06-22 RX ADMIN — HYDROMORPHONE HYDROCHLORIDE 0.5 MG: 1 INJECTION, SOLUTION INTRAMUSCULAR; INTRAVENOUS; SUBCUTANEOUS at 12:19

## 2017-06-22 RX ADMIN — OXYCODONE HYDROCHLORIDE 5 MG: 5 TABLET ORAL at 15:00

## 2017-06-22 RX ADMIN — IPRATROPIUM BROMIDE AND ALBUTEROL SULFATE 3 ML: .5; 3 SOLUTION RESPIRATORY (INHALATION) at 22:17

## 2017-06-22 RX ADMIN — HYDROMORPHONE HYDROCHLORIDE 0.5 MG: 1 INJECTION, SOLUTION INTRAMUSCULAR; INTRAVENOUS; SUBCUTANEOUS at 02:15

## 2017-06-22 RX ADMIN — PANTOPRAZOLE SODIUM 40 MG: 40 INJECTION, POWDER, FOR SOLUTION INTRAVENOUS at 07:59

## 2017-06-22 RX ADMIN — OXYCODONE HYDROCHLORIDE 10 MG: 5 TABLET ORAL at 02:04

## 2017-06-22 RX ADMIN — TRAZODONE HYDROCHLORIDE 50 MG: 50 TABLET ORAL at 21:07

## 2017-06-22 RX ADMIN — ACETAMINOPHEN 650 MG: 325 TABLET, FILM COATED ORAL at 06:01

## 2017-06-22 RX ADMIN — HYDROMORPHONE HYDROCHLORIDE 0.5 MG: 1 INJECTION, SOLUTION INTRAMUSCULAR; INTRAVENOUS; SUBCUTANEOUS at 16:42

## 2017-06-22 RX ADMIN — IPRATROPIUM BROMIDE AND ALBUTEROL SULFATE 3 ML: .5; 3 SOLUTION RESPIRATORY (INHALATION) at 08:07

## 2017-06-22 RX ADMIN — HYDROMORPHONE HYDROCHLORIDE 0.5 MG: 1 INJECTION, SOLUTION INTRAMUSCULAR; INTRAVENOUS; SUBCUTANEOUS at 04:24

## 2017-06-22 RX ADMIN — ASPIRIN 81 MG CHEWABLE TABLET 81 MG: 81 TABLET CHEWABLE at 07:59

## 2017-06-22 RX ADMIN — ACETAMINOPHEN 650 MG: 325 TABLET, FILM COATED ORAL at 14:59

## 2017-06-22 RX ADMIN — OXYCODONE HYDROCHLORIDE 10 MG: 5 TABLET ORAL at 10:00

## 2017-06-22 RX ADMIN — IPRATROPIUM BROMIDE AND ALBUTEROL SULFATE 3 ML: .5; 3 SOLUTION RESPIRATORY (INHALATION) at 01:00

## 2017-06-22 RX ADMIN — ACETAMINOPHEN 650 MG: 325 TABLET, FILM COATED ORAL at 10:00

## 2017-06-22 RX ADMIN — ACETAMINOPHEN 650 MG: 325 TABLET, FILM COATED ORAL at 02:04

## 2017-06-22 RX ADMIN — INSULIN ASPART 5 UNITS: 100 INJECTION, SOLUTION INTRAVENOUS; SUBCUTANEOUS at 20:21

## 2017-06-22 RX ADMIN — WARFARIN SODIUM 7.5 MG: 2.5 TABLET ORAL at 17:46

## 2017-06-22 RX ADMIN — CYCLOBENZAPRINE HYDROCHLORIDE 10 MG: 10 TABLET, FILM COATED ORAL at 21:07

## 2017-06-22 RX ADMIN — HYDROMORPHONE HYDROCHLORIDE 0.5 MG: 1 INJECTION, SOLUTION INTRAMUSCULAR; INTRAVENOUS; SUBCUTANEOUS at 20:59

## 2017-06-22 RX ADMIN — CYCLOBENZAPRINE HYDROCHLORIDE 10 MG: 10 TABLET, FILM COATED ORAL at 08:52

## 2017-06-22 RX ADMIN — MUPIROCIN 1 G: 20 OINTMENT TOPICAL at 08:34

## 2017-06-22 RX ADMIN — INSULIN ASPART 4 UNITS: 100 INJECTION, SOLUTION INTRAVENOUS; SUBCUTANEOUS at 08:33

## 2017-06-22 RX ADMIN — IPRATROPIUM BROMIDE AND ALBUTEROL SULFATE 3 ML: .5; 3 SOLUTION RESPIRATORY (INHALATION) at 14:03

## 2017-06-22 RX ADMIN — SENNOSIDES AND DOCUSATE SODIUM 1 TABLET: 8.6; 5 TABLET ORAL at 07:59

## 2017-06-22 RX ADMIN — OXYCODONE HYDROCHLORIDE 10 MG: 5 TABLET ORAL at 06:01

## 2017-06-22 ASSESSMENT — PAIN DESCRIPTION - DESCRIPTORS
DESCRIPTORS: ACHING
DESCRIPTORS: DISCOMFORT;SORE
DESCRIPTORS: DISCOMFORT
DESCRIPTORS: ACHING

## 2017-06-22 NOTE — PLAN OF CARE
Problem: Ventricular Assist Device (Adult)  Goal: Signs and Symptoms of Listed Potential Problems Will be Absent or Manageable (Ventricular Assist Device)  Signs and symptoms of listed potential problems will be absent or manageable by discharge/transition of care (reference Ventricular Assist Device (Adult) CPG).   Insulin drip  D/BS required drip to be stopped.   I/paged surgery about changing him to SS, and transition off the drip as BS have been very stable  A/new orders obtained, drip is already off due to out of range BS, will check BS and give SS as ordered  P/monitor for changes     Tachy  D/had him do the I/S and cough twice.   A/then sudden -narrow complex tachy to 140-150, no symptoms for now  I/had him take some deep breaths, and bear down with no effect. Paging system did not page surgery, text paged surgery, called 4E to see if surgery was around. They recommended I talk to intensivist as they round with surgery. 12 lead is done. He said he would come to see him   P/monitor for changes  I/gave 12 lead to MD also, talked to surgery person later this evening when he was on unit. Tachy episode about 45 minutes long, no symptoms. He converted spontaneously.   P/monitor for changes  A/no recurrence this evening of SVT    Post op  D/He continues to take po and IV pain meds. He sleeps between, when he wakes up he wants more pain meds.   I/team changed Dilaudid from q 1 hour to q 2 hours. I also talked to him about how po is 1st choice, and IV is for filling in between. Reviewed the advantages of the oral as it give more steady pain control for 3-4 hours.   P/monitor for changes. To start VAD education tomorrow

## 2017-06-22 NOTE — PLAN OF CARE
Problem: Goal Outcome Summary  Goal: Goal Outcome Summary  PT 4E: Initial PT evaluation completed, treatment initiated. Reviewed sternal precautions and implications on functional transfers/use of cardiac pillow. Supine>sitting with mod A, sit<>stand with min A x 2. Pt took 6 steps to chair with CGA x 2. Standing tolerance ~ 1 minute with completion of standing marching. Fatigues quickly. Primarily limited by poor activity tolerance.     Recommend: TCU

## 2017-06-22 NOTE — PLAN OF CARE
Problem: Goal Outcome Summary  Goal: Goal Outcome Summary  Outcome: Improving  D/A/I: A/O. Added flexeril to pain regimen; pt denied second scheduled dosing d/t medication making him sleepy. Oxy/tylenol given q4 hr with IV dilaudid for breakthrough. Cordis and Sully removed around 1315. R PIV infusing with hep gtt and insulin gtt. HM2: Speed 9000, flow 4-5, PI 4-5, carlisle 4-5, no alarms. Uses home CPAP at night, 3L NC when awake. Up to recliner x2 hrs. Good appetite, no BM, on bowel regimen. No UOP since christopher removed around noon. CT < 10 ml/hr.  P: Tx to unit 6C. Continue to monitor and notify MD of any changes.

## 2017-06-22 NOTE — PROGRESS NOTES
D: Stopped by to see patient. Patient up in chair, reports fatigue and some pain. Wife not present at this time. Provided support and listened to patient and care giver's thoughts and concerns.  P: Continue to follow patient and address any questions or concerns patient and or caregiver may have. Set up LVAD education for Friday, 6/23/17, at 12:30

## 2017-06-22 NOTE — PROGRESS NOTES
CVICU Progress Note  06/22/2017    MAJOR CHANGE/PLAN:  -Advance diet  -Continue Diuresis with PRN doses with goal net even  -Start flexeril  -Remove christopher  -Transfer to floor    Subjective and Interval Events: doing well on supplemental oxygen. Reporting pain (partly spasm/muscular pain) that is only moderately covered with Q4h tylenol/oxy and dilaudid PRN doses.    Objective:  Vitals:   Temp:  [97.9  F (36.6  C)-100  F (37.8  C)] 98.8  F (37.1  C)  Heart Rate:  [] 87  Resp:  [14-20] 18  MAP:  [68 mmHg-85 mmHg] 78 mmHg  Arterial Line BP: (72-98)/(64-79) 83/75  SpO2:  [96 %-100 %] 98 %    Ventilation Mode: CPAP/PS  FiO2 (%): 40 %  Rate Set (breaths/minute): 16 breaths/min  Tidal Volume Set (mL): 500 mL  PEEP (cm H2O): 5 cmH2O  Pressure Support (cm H2O): 7 cmH2O  Oxygen Concentration (%): 40 %  Resp: 18    Intake/Output:   I/O last 3 completed shifts:  In: 2125 [P.O.:750; I.V.:1375]  Out: 2418 [Urine:2038; Chest Tube:380]    Physical exam:  General: NAD, reporting some pain in chest  Neuro: alert, verbal and interacting appropriately  Resp: On supplemental oxygen, no dyspnea  CV: RRR, LVAD, some chest discomfort  Abdomen: Soft, Non-distended, Non-tender  Extremities: warm and well perfused    Labs:    Recent Labs  Lab 06/22/17  0418 06/21/17  1921 06/21/17  1559 06/21/17  0359   WBC 13.1*  --   --  11.8*   HGB 8.9*  --  9.5* 8.8*   *  --  104* 115*   INR 1.44*  --   --  1.43*    138  --  141   POTASSIUM 4.2 4.0 4.3 4.2   BUN 34* 29  --  28   CR 1.93* 1.63*  --  1.48*       Assessment and Plan: 62M with NICM (EF 20%) s/p Bi-V ICD (6/20), severe MR, VT, paroxsymal afib, DM, HTN, CKD, CECILIA, MDD.     Neuro:  PRN oxy added, off sedation, flexeril   CV:  Off pressors. ASA, LVAD parameters stable  Resp:  Wean O2, stable after extubation  GI: PPI  FEN:  MIVF, dysphagia study  :  Christopher, strict I/O. Lower urine output, scheduled Lasix  Heme:  Monitor, stable after transfusion. Started warfarin dosing for INR  goal 2-3.  ID:  Fluconazole, Levaquin, Rifampin, Vanco  Imm: None indicated  Endo:  Insulin gtt, BS stable  PPx:  Monitor bleeding. Stable post IABP removal, Heparin drip started  Lines: CVC (R IJ), Art line, CT (Pleural and Mediastinal), ETT  Dispo:  CVICU - To floor    Seen and discussed with Dr. Rodriguez.    Gage Tapia MD  Anesthesia Resident, CA-1

## 2017-06-22 NOTE — PROGRESS NOTES
06/22/17 0901   Quick Adds   Type of Visit Initial PT Evaluation   Living Environment   Lives With grandchild(lidia);spouse   Living Arrangements house   Home Accessibility bed and bath are not on the first floor;bed and bath on same level;stairs to enter home;stairs within home   Number of Stairs to Enter Home 1   Number of Stairs Within Home 13   Transportation Available family or friend will provide   Living Environment Comment Pt lives in a spit level home. Bed/bathroom/living room upstairs. 8 stairs to access upper level, 5 stairs to access basement. Does not need to go to lower level. Lives with his wife and great grandaughter in a house. Has been very limited and spending most of his time in the living room due to RESENDIZ.    Self-Care   Dominant Hand right   Usual Activity Tolerance fair   Current Activity Tolerance fair   Regular Exercise no   Equipment Currently Used at Home none   Activity/Exercise/Self-Care Comment In January, pat was able to ambulate 7000 steps/day. Significant decrease in activity tolerance starting in Feb/March. Limited to <500 steps/day prior to admission. Has not been able to do stairs 2/2 RESENDIZ.    Functional Level Prior   Ambulation 0-->independent   Transferring 0-->independent   Toileting 0-->independent   Bathing 0-->independent   Dressing 0-->independent   Eating 0-->independent   Communication 0-->understands/communicates without difficulty   Swallowing 0-->swallows foods/liquids without difficulty   Cognition 0 - no cognition issues reported   Fall history within last six months yes   Number of times patient has fallen within last six months 1   Which of the above functional risks had a recent onset or change? ambulation;transferring   Prior Functional Level Comment Pt fell x 1 from passing out. Fell forward when sitting on couch.    General Information   Onset of Illness/Injury or Date of Surgery - Date 06/20/17   Referring Physician Corey Reddy MD   Patient/Family Goals  Statement Walk my grandchild to the park   Pertinent History of Current Problem (include personal factors and/or comorbidities that impact the POC) PT: 62M with NICM (EF 20%) s/p Bi-V ICD, severe MR, VT, paroxsymal afib, DM, HTN, CKD, CECILIA, MDD s/p LVAD placement on 6/19. IABP out 6/20.   Precautions/Limitations abdominal precautions;sternal precautions   General Observations Pt supine in bed upon entry, agreeable to PT session, RN ok'd session. Pt on 3L O2 via NC. Pt has 2 CT to suction, christopher catheter, art line, PIVs, LVAD   General Info Comments Activity: Ambulate with assistance  TID and PRN.   Cognitive Status Examination   Orientation orientation to person, place and time   Level of Consciousness alert   Follows Commands and Answers Questions 100% of the time;able to follow multistep instructions   Personal Safety and Judgment intact   Memory intact   Pain Assessment   Patient Currently in Pain Yes, see Vital Sign flowsheet  (incisional pain)   Posture    Posture Protracted shoulders   Range of Motion (ROM)   ROM Comment BLE ROM WFL   Strength   Strength Comments BLE strength >3/5 per observation of functional strength. No MMT 2/2 abdominal precautions.    Bed Mobility   Bed Mobility Comments Supine>sitting with mod A, holding cardiac pillow to aide in adherence to sternal precautions.    Transfer Skills   Transfer Comments Sit>stand with min A x 2 from EOB height, holding cardiac pillow with BUEs   Gait   Gait Comments Pt ambulated 6 steps to chair with CGA x 2 for safety and line management.    Balance   Balance Comments Pt stands with normal ZEKE, CGA-SBA x 2. No postural sway noted. Mildly unsteady with ambulating to chair requiring CGA x 2.    General Therapy Interventions   Planned Therapy Interventions bed mobility training;transfer training;gait training   Clinical Impression   Criteria for Skilled Therapeutic Intervention yes, treatment indicated   PT Diagnosis impaired functional mobility   Influenced  "by the following impairments decreased activity tolerance, sternal precautions   Functional limitations due to impairments difficulty with bed mobility, transfers, ambulation, stairs    Clinical Presentation Evolving/Changing   Clinical Presentation Rationale medical status, comorbidities, clinical judgement   Clinical Decision Making (Complexity) Moderate complexity   Therapy Frequency` 5 times/week   Predicted Duration of Therapy Intervention (days/wks) 1 week   Anticipated Discharge Disposition Home with Outpatient Therapy;Transitional Care Facility   Risk & Benefits of therapy have been explained Yes   Patient, Family & other staff in agreement with plan of care Yes   [facility] TM \"6 Clicks\"   2016, Trustees of [facility], under license to [employer].  All rights reserved.   6 Clicks Short Forms Daily Activity Inpatient Short Form;Basic Mobility Inpatient Short Form   [facility]  \"6 Clicks\" V.2 Basic Mobility Inpatient Short Form   1. Turning from your back to your side while in a flat bed without using bedrails? 4 - None   2. Moving from lying on your back to sitting on the side of a flat bed without using bedrails? 2 - A Lot   3. Moving to and from a bed to a chair (including a wheelchair)? 2 - A Lot   4. Standing up from a chair using your arms (e.g., wheelchair, or bedside chair)? 2 - A Lot   5. To walk in hospital room? 3 - A Little   6. Climbing 3-5 steps with a railing? 3 - A Little   Basic Mobility Raw Score (Score out of 24.Lower scores equate to lower levels of function) 16   Total Evaluation Time   Total Evaluation Time (Minutes) 4     "

## 2017-06-22 NOTE — PROGRESS NOTES
CVICU Progress Note  06/22/2017    Subjective and Interval Events: incisional pain, feels like muscle spasms. Says oxy work for 2hrs and IV dilaudid for 30min. Otherwise no issues.    Objective:  Vitals:   Temp:  [97.9  F (36.6  C)-100  F (37.8  C)] 98.8  F (37.1  C)  Heart Rate:  [] 87  Resp:  [14-20] 18  MAP:  [68 mmHg-85 mmHg] 78 mmHg  Arterial Line BP: (72-98)/(64-79) 83/75  SpO2:  [96 %-100 %] 98 %    Ventilation Mode: CPAP/PS  FiO2 (%): 40 %  Rate Set (breaths/minute): 16 breaths/min  Tidal Volume Set (mL): 500 mL  PEEP (cm H2O): 5 cmH2O  Pressure Support (cm H2O): 7 cmH2O  Oxygen Concentration (%): 40 %  Resp: 18    Intake/Output:   I/O last 3 completed shifts:  In: 2125 [P.O.:750; I.V.:1375]  Out: 2418 [Urine:2038; Chest Tube:380]    Physical exam:  General: NAD  Neuro: A&O x3  Resp: non labored breathing, 3L NC  CV: RRR, LVAD  Abdomen: Soft, Non-distended, Non-tender  Extremities: warm and well perfused    Labs:    Recent Labs  Lab 06/22/17  0418 06/21/17  1921 06/21/17  1559 06/21/17  0359   WBC 13.1*  --   --  11.8*   HGB 8.9*  --  9.5* 8.8*   *  --  104* 115*   INR 1.44*  --   --  1.43*    138  --  141   POTASSIUM 4.2 4.0 4.3 4.2   BUN 34* 29  --  28   CR 1.93* 1.63*  --  1.48*       Assessment and Plan: 62M with NICM (EF 20%) s/p Bi-V ICD, severe MR, VT, paroxsymal afib, DM, HTN, CKD, CECILIA, MDD s/p LVAD placement on 6/19. IABP out 6/20.    Neuro:  prn tylenol, prn oxy, prn dilaudid; carroll flexeril  CV:  ASA 81, LVAD  Resp:  wean O2 as tolerated  GI: PPI  FEN:  Consist carb diet  :  D/c christopher  Heme:  Hgb stable; low intensity heparin gtt, coumadin  ID:  Fluconazole, Levaquin, Rifampin, Vanc  Endo:  Insulin gtt, BS stable  PPx:  SCDs, PPI  Lines: D/c CVC/a line/swan, CTs, christopher, PIV  Dispo:  To floor    Seen and discussed with Dr. Reddy.    Myke Sanchez MD PGY-3  Surgery Resident    Patient seen and examined. Continue anticoagulation with heparin and coumadin. Transfer to floor.   Continue current management plan. Agree with the findings in the Resident's note. I spent a total of 25 minutes examining the patient, reviewing investigations and therapeutic counseling.

## 2017-06-22 NOTE — OP NOTE
DATE OF SERVICE:  06/19/2017      PREOPERATIVE DIAGNOSES:   1.  Dilated cardiomyopathy.   2.  Cardiogenic shock, status post IV inotropes and balloon pump.   3.  Acute on chronic renal failure.   4.  Obesity.      POSTOPERATIVE DIAGNOSES:   1.  Dilated cardiomyopathy.   2.  Cardiogenic shock, status post IV inotropes and balloon pump.   3.  Acute on chronic renal failure.   4.  Obesity.      PROCEDURE:  Placement of HeartMate II left ventricular assist device (intracorporeal left ventricular assist device).      SURGEON:  Ronnie Quigley MD      ASSISTANT:  Corey Reddy MD      OPERATIVE INDICATIONS:  Jim Willingham is a 62-year-old gentleman with dilated cardiomyopathy who was admitted with cardiogenic shock requiring intraaortic balloon pump and inotropes.  A decision was made for HeartMate II LVAD placement as a bridge to transplantation and discussed risks and benefits of surgery with the patient and family including the risk of death, stroke, bleeding, wound infection, renal failure and arrhythmias.  He is willing to proceed with surgery.      OPERATIVE FINDINGS:  The patient's sternum was of adequate quality.  There was severe LV dysfunction.  There was mild-to-moderate RV dysfunction.  There was no thrombus in the apex of left ventricle.  There was no patent foramen ovale and only trace aortic incompetence.      DESCRIPTION OF OPERATION:  The patient was brought to operating room in stable condition.  Following the administration of general anesthesia, the patient's chest, abdomen and lower extremities prepped and draped in the usual sterile manner.  Median sternotomy was performed.  Preparation of cardiopulmonary bypass included ACT-guided heparinization and the admission of Amicar.  The aorta was cannulated with a 24-Uzbek arterial cannula via 3-0 Tevdek pursestring pledgeted suture x2.  Dual stage venous cannula was inserted in the right atrium.  Full cardiopulmonary bypass was initiated.  A preperitoneal  pocket was made.  Coring knife was used to core a portion of the apex of left ventricle, 2-0 Tevdek mattress sutures were placed on the apical core.  These sutures were then passed through the inflow cuff, which was then seated and tied without difficulty.  Inflow cannula was inserted in the heart and secured adequately.  Outflow graft was measured and sewn to a longitudinal aortotomy using continuous 4-0 Prolene suture.  The outflow graft was connected to the pump.  De-airing maneuvers were performed in the usual standard fashion.  Following confirmation of de-airing by echocardiography, the patient gradually weaned off cardiopulmonary bypass and LVAD pump was initiated.  Initial flows were stable.  Echo showed mild to moderate LV decompression, mild-to-moderate RV dysfunction.  Protamine was given and all cannulas removed.  Careful hemostasis obtained.  Two anterior mediastinal and bilateral pleural chest tubes were placed.  Sternum was closed with surgical steel wires.  Fascia, subcutaneous tissue and skin of chest were closed in layers.  The patient transferred to ICU in stable but critical condition.         DIYA BECKER MD             D: 2017 10:43   T: 2017 07:36   MT:       Name:     LOIS FULLER   MRN:      [id number]        Account:        [id number]   :      1955           Procedure Date: 2017      Document: [id number]       cc: [facility] Surgery Billing

## 2017-06-22 NOTE — PROGRESS NOTES
Cardiology Progress Note    Events and interval changes in past 24 hours:   S/p LVAD HM II with excellent early surgical result.  Got Lasix 60,80,80 yesterday.  Denies orthopnea, dyspnea, chest pain, palpitations or presyncope.  Several nonsignificant low PI alarms yesterday.    OBJECTIVE FINDINGS:  Temp: 98.3  F (36.8  C) Temp  Min: 97.9  F (36.6  C)  Max: 101.3  F (38.5  C)  Resp: 16 Resp  Min: 14  Max: 20  SpO2: 98 % SpO2  Min: 95 %  Max: 100 % 3LNC    No Data Recorded  Heart Rate: 90 Heart Rate  Min: 89  Max: 100    No data recorded.    MAPs 70s    HM2 VAD flow 5 PI 4.5-6 speed 9000 power 5    Gen: alert  Resp: clear to auscultation bilaterally, no crackles or wheezing   CV: VAD hum, No JVP appreciated while sitting position  Abd:soft, nontender,     2.3/1.5 UO yest  mediastinal 390 pleural 140  today 0.2/0.8 urine  mediastinal 60 pleural 20    Vitals:    06/18/17 0607 06/19/17 0500 06/20/17 0515 06/21/17 0415   Weight: 112.5 kg (248 lb 1.6 oz) 112.1 kg (247 lb 2.2 oz) 112.8 kg (248 lb 10.9 oz) 114.5 kg (252 lb 6.8 oz)    06/22/17 0600   Weight: 110.5 kg (243 lb 9.7 oz)     Inhaled NO    Warfarin Therapy Reminder       HEParin 1,300 Units/hr (06/22/17 0600)     IV fluid REPLACEMENT ONLY       insulin (regular) 1 Units/hr (06/22/17 0601)     Reason beta blocker order not selected       dexmedetomidine       dextrose 5% and 0.45% NaCl 30 mL/hr at 06/21/17 0800       ipratropium - albuterol 0.5 mg/2.5 mg/3 mL  3 mL Nebulization Q6H     aspirin  81 mg Oral or Feeding Tube Daily     traZODone  50 mg Oral At Bedtime     sodium chloride (PF)  3 mL Intracatheter Q8H     insulin aspart   Subcutaneous TID w/meals     pantoprazole  40 mg Intravenous Daily     mupirocin  1 g Both Nostrils BID     senna-docusate  1-2 tablet Oral BID     pneumococcal vaccine  0.5 mL Intramuscular Prior to discharge   oxyCODONE, HYDROmorphone, Warfarin Therapy Reminder, heparin, naloxone, IV fluid REPLACEMENT ONLY, glucose **OR** dextrose  **OR** glucagon, lidocaine, lidocaine 4%, sodium chloride (PF), Reason beta blocker order not selected, insulin aspart, albuterol, potassium chloride, potassium chloride, potassium chloride, potassium chloride with lidocaine, potassium chloride, magnesium sulfate, magnesium sulfate, potassium phosphate (KPHOS) in D5W IV, potassium phosphate (KPHOS) in D5W IV, potassium phosphate (KPHOS) in D5W IV, potassium phosphate (KPHOS) in D5W IV, potassium phosphate (KPHOS) in D5W IV, ondansetron **OR** ondansetron, acetaminophen **OR** acetaminophen  CMP  Recent Labs  Lab 06/22/17  0418 06/21/17  1921 06/21/17  1559 06/21/17  0359 06/20/17  1342  06/20/17  0439 06/20/17  0131  06/15/17  1155    138  --  141 140  --  141 139  < > 139   POTASSIUM 4.2 4.0 4.3 4.2 4.4  4.4  < > 4.7 5.1  < > 4.0   CHLORIDE 105 108  --  108 107  --  106 104  < > 103   CO2 23 21  --  23 26  --  24 24  < > 29   ANIONGAP 9 9  --  10 7  --  11 10  < > 7   * 116*  --  114* 124*  --  186* 186*  < > 109*   BUN 34* 29  --  28 28  --  26 25  < > 33*   CR 1.93* 1.63*  --  1.48* 1.57*  --  1.76* 1.49*  < > 1.41*   GFRESTIMATED 36* 43*  --  48* 45*  --  40* 48*  < > 51*   GFRESTBLACK 43* 52*  --  59* 55*  --  48* 58*  < > 62   QAMAR 9.1 8.3*  --  8.8 8.7  --  8.7 9.2  < > 9.0   MAG 2.1  --   --  2.4* 2.3  --  2.1 2.0  < >  --    PHOS 4.3  --   --  4.0  --   --  3.1 4.0  < >  --    PROTTOTAL  --   --   --   --   --   --   --   --   --  7.3   ALBUMIN  --   --   --   --   --   --   --   --   --  3.8   BILITOTAL  --   --   --   --   --   --   --   --   --  0.8   ALKPHOS  --   --   --   --   --   --   --   --   --  72   AST  --   --   --   --   --   --   --   --   --  23   ALT  --   --   --   --   --   --   --   --   --  26   < > = values in this interval not displayed.  CBC    Recent Labs  Lab 06/22/17  0418 06/21/17  1559 06/21/17  0359 06/20/17  2203 06/20/17  1342 06/20/17  1003 06/20/17  0439   WBC 13.1*  --  11.8*  --  13.0*  --  17.2*   RBC 3.04*   --  2.99*  --  2.80*  --  3.00*   HGB 8.9* 9.5* 8.8* 8.3* 8.1* 8.4* 8.7*   HCT 28.1*  --  27.5*  --  25.5* 25.5* 27.5*   MCV 92  --  92  --  91  --  92   MCH 29.3  --  29.4  --  28.9  --  29.0   MCHC 31.7  --  32.0  --  31.8  --  31.6   RDW 14.4  --  14.4  --  13.9  --  13.5   * 104* 115*  --  117*  --  149*     INR    Recent Labs  Lab 06/22/17  0418 06/21/17  0359 06/20/17  1342 06/20/17  0439   INR 1.44* 1.43* 1.49* 1.44*     Arterial Blood Gas    Recent Labs  Lab 06/22/17  0415 06/21/17  1559 06/21/17  0900 06/21/17  0657   PH 7.35 7.34* 7.39 7.37   PCO2 43 43 33* 37   PO2 84 114* 124* 137*   HCO3 24 23 20* 22   O2PER 3L  3L 4L 4L 40         Imaging and other studies:  EKG: Vpaced rhythm  Echocardiogram: before LVAD 6/12  Severe left ventricular dilation is present. LVIDd 8.0 cm.  Severe diffuse hypokinesis is present. Posterior wall akinesis is present.  The Ejection Fraction is estimated at 20-25%.Traced Biplane LVEF is 23%.  Moderate-Severe functional Mitral Regurgitation. There is tenting of the  mitral leaflets with restriction of the posterior mitral leaflet. ERO is 0.3  cm^2. RV 43 ml.  Mild right ventricular dilation is present. Global right ventricular function  is normal.  Dilation of the inferior vena cava is present with normal respiratory  variation in diameter.    Chest x-ray: not from today  Yesterday: lungs clear,    Device settings DDDR  VT/VF ATP and defib, 188  monitor    ASSESSMENT:    62M h/o NICM (EF 20%) s/p Bi-V ICD, severe MR, VT, paroxsymal afib, DM, HTN, CKD, CECILIA, MDD, admitted for gradual shortness of breath, likely 2/2 to inadequate maintenance diuretics. Improving with BiPAP.       # NICM s/p CRT-D s/p HM2 LVAD as DT 6/20, s/p [id number]  in May  # Normal RV function prior to LVAD  # Severe functional MR    # Intubated for surgery-extubated 6/21    # Gout-left elbow    # hx of VT  # pAF  #HTN  #CKD-basline Cr 1.4  #CECILIA      PLAN:  - Hold diuresis for  today. Likely intravascularly dry based on BUN. Difficult IVC and RV function visualization by bedside echo. Likely formal echo tomorrow  -Goal MAP<90.   - aspirin 81, heparin/warfarin  -optimize LVAD speed increased to 9000 on 6/20  -needs post VAD echo at some point to assess RV function      Will staff with Dr. Ramiro Gómez  General cards fellow, PGY4  Pager [phone]    Attending Attestation:  Patient seen and examined by me with the team. I have performed all pertinent elements of the physical examination and reviewed the note above. I have reviewed pertinent laboratory, echocardiographic, imaging, and cardiac catheterization results. I agree with the plan of care as described in this note.    Alberto Rubio MD, PhD

## 2017-06-22 NOTE — PLAN OF CARE
Problem: Individualization  Goal: Patient Preferences  D/A/I: Patient alert and oriented. LS clear and diminished. Home CPAP at night, 3L NC when awake. HM2, no alarms, Speed 9000, flow 4-5, PI 4-5, carlisle 5's. Difficult managing pain with oral meds, needed IV dilaudid during the night. UOP . CT 0-20 cc/hr.   P: Will continue to monitor and notify MD of any changes.

## 2017-06-22 NOTE — PLAN OF CARE
Problem: Goal Outcome Summary  Goal: Goal Outcome Summary  OT 4E Cx Pt just returned to bed and resting.

## 2017-06-23 ENCOUNTER — APPOINTMENT (OUTPATIENT)
Dept: GENERAL RADIOLOGY | Facility: CLINIC | Age: 62
DRG: 001 | End: 2017-06-23
Attending: PHYSICIAN ASSISTANT
Payer: COMMERCIAL

## 2017-06-23 ENCOUNTER — APPOINTMENT (OUTPATIENT)
Dept: PHYSICAL THERAPY | Facility: CLINIC | Age: 62
DRG: 001 | End: 2017-06-23
Attending: PHYSICIAN ASSISTANT
Payer: COMMERCIAL

## 2017-06-23 ENCOUNTER — APPOINTMENT (OUTPATIENT)
Dept: CARDIOLOGY | Facility: CLINIC | Age: 62
DRG: 001 | End: 2017-06-23
Attending: PHYSICIAN ASSISTANT
Payer: COMMERCIAL

## 2017-06-23 LAB
ANION GAP SERPL CALCULATED.3IONS-SCNC: 9 MMOL/L (ref 3–14)
BASE DEFICIT BLDV-SCNC: 1 MMOL/L
BUN SERPL-MCNC: 37 MG/DL (ref 7–30)
CALCIUM SERPL-MCNC: 8.6 MG/DL (ref 8.5–10.1)
CHLORIDE SERPL-SCNC: 104 MMOL/L (ref 94–109)
CO2 SERPL-SCNC: 25 MMOL/L (ref 20–32)
CREAT SERPL-MCNC: 2.12 MG/DL (ref 0.66–1.25)
ERYTHROCYTE [DISTWIDTH] IN BLOOD BY AUTOMATED COUNT: 14.2 % (ref 10–15)
GFR SERPL CREATININE-BSD FRML MDRD: 32 ML/MIN/1.7M2
GLUCOSE BLDC GLUCOMTR-MCNC: 136 MG/DL (ref 70–99)
GLUCOSE BLDC GLUCOMTR-MCNC: 149 MG/DL (ref 70–99)
GLUCOSE BLDC GLUCOMTR-MCNC: 173 MG/DL (ref 70–99)
GLUCOSE BLDC GLUCOMTR-MCNC: 194 MG/DL (ref 70–99)
GLUCOSE SERPL-MCNC: 135 MG/DL (ref 70–99)
HCO3 BLDV-SCNC: 25 MMOL/L (ref 21–28)
HCT VFR BLD AUTO: 26 % (ref 40–53)
HGB BLD-MCNC: 8.4 G/DL (ref 13.3–17.7)
INR PPP: 1.53 (ref 0.86–1.14)
INTERPRETATION ECG - MUSE: NORMAL
LMWH PPP CHRO-ACNC: 0.22 IU/ML
MAGNESIUM SERPL-MCNC: 2 MG/DL (ref 1.6–2.3)
MCH RBC QN AUTO: 29.7 PG (ref 26.5–33)
MCHC RBC AUTO-ENTMCNC: 32.3 G/DL (ref 31.5–36.5)
MCV RBC AUTO: 92 FL (ref 78–100)
O2/TOTAL GAS SETTING VFR VENT: ABNORMAL %
OXYHGB MFR BLDV: 79 %
PCO2 BLDV: 45 MM HG (ref 40–50)
PH BLDV: 7.34 PH (ref 7.32–7.43)
PHOSPHATE SERPL-MCNC: 3.4 MG/DL (ref 2.5–4.5)
PLATELET # BLD AUTO: 119 10E9/L (ref 150–450)
PO2 BLDV: 49 MM HG (ref 25–47)
POTASSIUM SERPL-SCNC: 4 MMOL/L (ref 3.4–5.3)
RBC # BLD AUTO: 2.83 10E12/L (ref 4.4–5.9)
SODIUM SERPL-SCNC: 137 MMOL/L (ref 133–144)
WBC # BLD AUTO: 8.8 10E9/L (ref 4–11)

## 2017-06-23 PROCEDURE — 94640 AIRWAY INHALATION TREATMENT: CPT

## 2017-06-23 PROCEDURE — 99232 SBSQ HOSP IP/OBS MODERATE 35: CPT | Mod: 25 | Performed by: INTERNAL MEDICINE

## 2017-06-23 PROCEDURE — 00000146 ZZHCL STATISTIC GLUCOSE BY METER IP

## 2017-06-23 PROCEDURE — 27410244

## 2017-06-23 PROCEDURE — 71010 XR CHEST PORT 1 VW: CPT

## 2017-06-23 PROCEDURE — 85520 HEPARIN ASSAY: CPT | Performed by: SURGERY

## 2017-06-23 PROCEDURE — 25000132 ZZH RX MED GY IP 250 OP 250 PS 637: Performed by: SURGERY

## 2017-06-23 PROCEDURE — 93005 ELECTROCARDIOGRAM TRACING: CPT

## 2017-06-23 PROCEDURE — 21400003 ZZH R&B CCU CRITICAL UMMC

## 2017-06-23 PROCEDURE — 80048 BASIC METABOLIC PNL TOTAL CA: CPT

## 2017-06-23 PROCEDURE — 25000125 ZZHC RX 250: Performed by: SURGERY

## 2017-06-23 PROCEDURE — 94640 AIRWAY INHALATION TREATMENT: CPT | Mod: 76

## 2017-06-23 PROCEDURE — 93306 TTE W/DOPPLER COMPLETE: CPT

## 2017-06-23 PROCEDURE — 93306 TTE W/DOPPLER COMPLETE: CPT | Mod: 26 | Performed by: INTERNAL MEDICINE

## 2017-06-23 PROCEDURE — 93010 ELECTROCARDIOGRAM REPORT: CPT | Performed by: INTERNAL MEDICINE

## 2017-06-23 PROCEDURE — 25000131 ZZH RX MED GY IP 250 OP 636 PS 637: Performed by: SURGERY

## 2017-06-23 PROCEDURE — 85610 PROTHROMBIN TIME: CPT | Performed by: SURGERY

## 2017-06-23 PROCEDURE — 25000132 ZZH RX MED GY IP 250 OP 250 PS 637: Performed by: STUDENT IN AN ORGANIZED HEALTH CARE EDUCATION/TRAINING PROGRAM

## 2017-06-23 PROCEDURE — 36415 COLL VENOUS BLD VENIPUNCTURE: CPT | Performed by: PHYSICIAN ASSISTANT

## 2017-06-23 PROCEDURE — 84100 ASSAY OF PHOSPHORUS: CPT

## 2017-06-23 PROCEDURE — 25000132 ZZH RX MED GY IP 250 OP 250 PS 637: Performed by: THORACIC SURGERY (CARDIOTHORACIC VASCULAR SURGERY)

## 2017-06-23 PROCEDURE — 25000125 ZZHC RX 250: Performed by: PHYSICIAN ASSISTANT

## 2017-06-23 PROCEDURE — 40000193 ZZH STATISTIC PT WARD VISIT: Performed by: PHYSICAL THERAPIST

## 2017-06-23 PROCEDURE — 25000128 H RX IP 250 OP 636: Performed by: PHYSICIAN ASSISTANT

## 2017-06-23 PROCEDURE — 82805 BLOOD GASES W/O2 SATURATION: CPT | Performed by: SURGERY

## 2017-06-23 PROCEDURE — 36415 COLL VENOUS BLD VENIPUNCTURE: CPT | Performed by: SURGERY

## 2017-06-23 PROCEDURE — 83735 ASSAY OF MAGNESIUM: CPT

## 2017-06-23 PROCEDURE — 36415 COLL VENOUS BLD VENIPUNCTURE: CPT

## 2017-06-23 PROCEDURE — 40000275 ZZH STATISTIC RCP TIME EA 10 MIN

## 2017-06-23 PROCEDURE — 97530 THERAPEUTIC ACTIVITIES: CPT | Mod: GP | Performed by: PHYSICAL THERAPIST

## 2017-06-23 PROCEDURE — 25000132 ZZH RX MED GY IP 250 OP 250 PS 637: Performed by: PHYSICIAN ASSISTANT

## 2017-06-23 PROCEDURE — 85027 COMPLETE CBC AUTOMATED: CPT | Performed by: PHYSICIAN ASSISTANT

## 2017-06-23 RX ORDER — METOPROLOL TARTRATE 1 MG/ML
5 INJECTION, SOLUTION INTRAVENOUS ONCE
Status: COMPLETED | OUTPATIENT
Start: 2017-06-23 | End: 2017-06-23

## 2017-06-23 RX ORDER — OXYCODONE HYDROCHLORIDE 5 MG/1
5-10 TABLET ORAL
Status: DISCONTINUED | OUTPATIENT
Start: 2017-06-23 | End: 2017-06-24

## 2017-06-23 RX ORDER — CYCLOBENZAPRINE HCL 5 MG
5 TABLET ORAL 3 TIMES DAILY PRN
Status: DISCONTINUED | OUTPATIENT
Start: 2017-06-23 | End: 2017-06-24

## 2017-06-23 RX ORDER — ACETAMINOPHEN 325 MG/1
650 TABLET ORAL EVERY 6 HOURS
Status: DISCONTINUED | OUTPATIENT
Start: 2017-06-23 | End: 2017-06-30 | Stop reason: HOSPADM

## 2017-06-23 RX ORDER — MONTELUKAST SODIUM 10 MG/1
10 TABLET ORAL AT BEDTIME
Status: DISCONTINUED | OUTPATIENT
Start: 2017-06-23 | End: 2017-06-30 | Stop reason: HOSPADM

## 2017-06-23 RX ORDER — POTASSIUM CHLORIDE 750 MG/1
20 TABLET, EXTENDED RELEASE ORAL 2 TIMES DAILY
Status: COMPLETED | OUTPATIENT
Start: 2017-06-23 | End: 2017-06-23

## 2017-06-23 RX ORDER — CITALOPRAM HYDROBROMIDE 20 MG/1
20 TABLET ORAL DAILY
Status: DISCONTINUED | OUTPATIENT
Start: 2017-06-23 | End: 2017-06-30 | Stop reason: HOSPADM

## 2017-06-23 RX ORDER — WARFARIN SODIUM 10 MG/1
10 TABLET ORAL
Status: COMPLETED | OUTPATIENT
Start: 2017-06-23 | End: 2017-06-23

## 2017-06-23 RX ADMIN — HYDROMORPHONE HYDROCHLORIDE 0.3 MG: 1 INJECTION, SOLUTION INTRAMUSCULAR; INTRAVENOUS; SUBCUTANEOUS at 12:59

## 2017-06-23 RX ADMIN — MONTELUKAST SODIUM 10 MG: 10 TABLET, FILM COATED ORAL at 21:03

## 2017-06-23 RX ADMIN — IPRATROPIUM BROMIDE AND ALBUTEROL SULFATE 3 ML: .5; 3 SOLUTION RESPIRATORY (INHALATION) at 02:07

## 2017-06-23 RX ADMIN — HYDROMORPHONE HYDROCHLORIDE 0.5 MG: 1 INJECTION, SOLUTION INTRAMUSCULAR; INTRAVENOUS; SUBCUTANEOUS at 02:06

## 2017-06-23 RX ADMIN — WARFARIN SODIUM 10 MG: 10 TABLET ORAL at 18:02

## 2017-06-23 RX ADMIN — OXYCODONE HYDROCHLORIDE 10 MG: 5 TABLET ORAL at 00:01

## 2017-06-23 RX ADMIN — PANTOPRAZOLE SODIUM 40 MG: 40 TABLET, DELAYED RELEASE ORAL at 08:10

## 2017-06-23 RX ADMIN — METOPROLOL TARTRATE 12.5 MG: 25 TABLET, FILM COATED ORAL at 21:03

## 2017-06-23 RX ADMIN — OXYCODONE HYDROCHLORIDE 10 MG: 5 TABLET ORAL at 04:51

## 2017-06-23 RX ADMIN — ASPIRIN 81 MG CHEWABLE TABLET 81 MG: 81 TABLET CHEWABLE at 08:10

## 2017-06-23 RX ADMIN — MUPIROCIN 1 G: 20 OINTMENT TOPICAL at 08:12

## 2017-06-23 RX ADMIN — OXYCODONE HYDROCHLORIDE 10 MG: 5 TABLET ORAL at 21:03

## 2017-06-23 RX ADMIN — ACETAMINOPHEN 650 MG: 325 TABLET, FILM COATED ORAL at 13:37

## 2017-06-23 RX ADMIN — IPRATROPIUM BROMIDE AND ALBUTEROL SULFATE 3 ML: .5; 3 SOLUTION RESPIRATORY (INHALATION) at 14:17

## 2017-06-23 RX ADMIN — ACETAMINOPHEN 650 MG: 325 TABLET, FILM COATED ORAL at 21:03

## 2017-06-23 RX ADMIN — SENNOSIDES AND DOCUSATE SODIUM 2 TABLET: 8.6; 5 TABLET ORAL at 08:10

## 2017-06-23 RX ADMIN — CITALOPRAM HYDROBROMIDE 20 MG: 20 TABLET ORAL at 12:59

## 2017-06-23 RX ADMIN — POTASSIUM CHLORIDE 20 MEQ: 750 TABLET, EXTENDED RELEASE ORAL at 21:04

## 2017-06-23 RX ADMIN — Medication 500 MCG: at 13:11

## 2017-06-23 RX ADMIN — SENNOSIDES AND DOCUSATE SODIUM 1 TABLET: 8.6; 5 TABLET ORAL at 21:04

## 2017-06-23 RX ADMIN — HYDROMORPHONE HYDROCHLORIDE 0.5 MG: 1 INJECTION, SOLUTION INTRAMUSCULAR; INTRAVENOUS; SUBCUTANEOUS at 05:09

## 2017-06-23 RX ADMIN — ACETAMINOPHEN 650 MG: 325 TABLET, FILM COATED ORAL at 08:10

## 2017-06-23 RX ADMIN — POTASSIUM CHLORIDE 20 MEQ: 750 TABLET, EXTENDED RELEASE ORAL at 15:31

## 2017-06-23 RX ADMIN — HYDROMORPHONE HYDROCHLORIDE 0.5 MG: 1 INJECTION, SOLUTION INTRAMUSCULAR; INTRAVENOUS; SUBCUTANEOUS at 09:42

## 2017-06-23 RX ADMIN — IPRATROPIUM BROMIDE AND ALBUTEROL SULFATE 3 ML: .5; 3 SOLUTION RESPIRATORY (INHALATION) at 21:22

## 2017-06-23 RX ADMIN — TRAZODONE HYDROCHLORIDE 50 MG: 50 TABLET ORAL at 21:03

## 2017-06-23 RX ADMIN — HYDROMORPHONE HYDROCHLORIDE 0.5 MG: 1 INJECTION, SOLUTION INTRAMUSCULAR; INTRAVENOUS; SUBCUTANEOUS at 19:31

## 2017-06-23 RX ADMIN — OXYCODONE HYDROCHLORIDE 10 MG: 5 TABLET ORAL at 16:56

## 2017-06-23 RX ADMIN — METOROPROLOL TARTRATE 5 MG: 5 INJECTION, SOLUTION INTRAVENOUS at 15:24

## 2017-06-23 RX ADMIN — HYDROMORPHONE HYDROCHLORIDE 0.5 MG: 1 INJECTION, SOLUTION INTRAMUSCULAR; INTRAVENOUS; SUBCUTANEOUS at 15:35

## 2017-06-23 RX ADMIN — IPRATROPIUM BROMIDE AND ALBUTEROL SULFATE 3 ML: .5; 3 SOLUTION RESPIRATORY (INHALATION) at 07:42

## 2017-06-23 RX ADMIN — OXYCODONE HYDROCHLORIDE 10 MG: 5 TABLET ORAL at 13:37

## 2017-06-23 RX ADMIN — ACETAMINOPHEN 650 MG: 325 TABLET, FILM COATED ORAL at 02:05

## 2017-06-23 RX ADMIN — Medication 2 G: at 15:21

## 2017-06-23 RX ADMIN — INSULIN ASPART 2 UNITS: 100 INJECTION, SOLUTION INTRAVENOUS; SUBCUTANEOUS at 13:08

## 2017-06-23 RX ADMIN — POTASSIUM CHLORIDE 20 MEQ: 750 TABLET, EXTENDED RELEASE ORAL at 08:10

## 2017-06-23 ASSESSMENT — PAIN DESCRIPTION - DESCRIPTORS
DESCRIPTORS: ACHING;DISCOMFORT;SORE

## 2017-06-23 NOTE — PROGRESS NOTES
CVTS Daily Note  6/23/2017  Attending: Ronnie Quigley MD    S:   No overnight events. Up from ICU yesterday.   Pt seen at bedside resting comfortably.    Does complain of incisional pain, otherwise no acute complaints.      Denies F/C/Sweats.  No CP, SOB, or calf pain.    Tolerating diet but not very hungry.  - BM.  Minimal Flatus.    Ambulated well with assistance.    Pain controlled poorly, wants q 3 oxycodone, doesn't like flexeril (sleepy).    O:   Vitals:    06/23/17 0440 06/23/17 0501 06/23/17 0742 06/23/17 0844   BP: 90/76   (!) 80/64   BP Location: Right arm   Left arm   Pulse:       Resp: 18   18   Temp:    98.2  F (36.8  C)   TempSrc:    Oral   SpO2: 100%  100%    Weight:  114 kg (251 lb 4.8 oz)     Height:         Vitals:    06/21/17 0415 06/22/17 0600 06/23/17 0501   Weight: 114.5 kg (252 lb 6.8 oz) 110.5 kg (243 lb 9.7 oz) 114 kg (251 lb 4.8 oz)     + 0 kg since admit    Intake/Output Summary (Last 24 hours) at 06/23/17 0939  Last data filed at 06/23/17 0600   Gross per 24 hour   Intake              755 ml   Output              784 ml   Net              -29 ml       MAPs: 70 - 82  Gen: AAO x 3, pleasant, NAD  CV: LVAD humming, no murmurs, rubs, or gallops.   Pulm: CTA, no rhonchi or wheezes  Abd: soft, non-tender, no guarding  Ext: trace peripheral edema, non-pitting  Incision: overall clean, dry, intact, no erythema. Inferior 2 cm sternal incision moist with only superficial dehiscence   Chest Tube sites: dressings clean and dry, serosanguinous, no air leak, output 60 cc / 50 cc   * removed mediastinal tubes without immediate complication.   Lines: driveline dressing clean and dry, marked 6/21 (2 days old)  LVAD: speed 5000, flow 4.9 - 5.4, PI 4.7 - 5.5, power 5.4.       Labs:  [facility]    Recent Labs  Lab 06/23/17  0626 06/22/17  0418 06/21/17  1921 06/21/17  1559 06/21/17  0359    138 138  --  141   POTASSIUM 4.0 4.2 4.0 4.3 4.2   CHLORIDE 104 105 108  --  108   QAMAR 8.6 9.1 8.3*  --  8.8   CO2 25 23  21  --  23   BUN 37* 34* 29  --  28   CR 2.12* 1.93* 1.63*  --  1.48*   * 121* 116*  --  114*     CBC    Recent Labs  Lab 06/23/17  1002 06/22/17  0418 06/21/17  1559 06/21/17  0359  06/20/17  1342   WBC 8.8 13.1*  --  11.8*  --  13.0*   RBC 2.83* 3.04*  --  2.99*  --  2.80*   HGB 8.4* 8.9* 9.5* 8.8*  < > 8.1*   HCT 26.0* 28.1*  --  27.5*  --  25.5*   MCV 92 92  --  92  --  91   MCH 29.7 29.3  --  29.4  --  28.9   MCHC 32.3 31.7  --  32.0  --  31.8   RDW 14.2 14.4  --  14.4  --  13.9   * 113* 104* 115*  --  117*   < > = values in this interval not displayed.  INR    Recent Labs  Lab 06/23/17  0626 06/22/17  0418 06/21/17  0359 06/20/17  1342   INR 1.53* 1.44* 1.43* 1.49*      Hepatic Panel   Lab Results   Component Value Date    AST 23 06/15/2017     Lab Results   Component Value Date    ALT 26 06/15/2017     Lab Results   Component Value Date    ALBUMIN 3.8 06/15/2017     GLUCOSE:     Recent Labs  Lab 06/23/17  0849 06/23/17  0626 06/23/17  0202 06/22/17  2155 06/22/17  1620 06/22/17  1133 06/22/17  0803  06/22/17  0418  06/21/17  1921  06/21/17  0359  06/20/17  1342  06/20/17  0439   GLC  --  135*  --   --   --   --   --   --  121*  --  116*  --  114*  --  124*  --  186*   *  --  136* 183* 96 162* 102*  < >  --   < >  --   < >  --   < >  --   < >  --    < > = values in this interval not displayed.      Imaging:  reviewed recent imaging      A/P:   Jim Willingham is a 62 year old male with NICM (EF 20%) s/p Bi-V ICD, severe MR, VT, paroxsymal afib, DM, HTN, CKD, CECILIA, who is s/p HeartMate II left ventricular assist device placement 6/19 with Dr Quigley. IABP removed POD #1.     Neuro:   - Evaristo APAP, prn oxy q 3, prn IV dilaudid, prn flexeril   - PTA meds; Celexa, Trazodone    CV:   - HM II lvad as DT, stage D NYHA Class IIIB  - ASA 81 mg  - Nonsustained SVT/A-fib. Replace Lytes PRN. Metoprolol 12.5 mg BID per Cards II.     Pulm:   - Extubated POD #2.   - IS, deep breathe, Nebs q 6 hrs    ID:   -  Completed jason-op ABx. Afebrile. WBC wnl.     GI / FEN:    - Hx gastric bypass, B12 supplement.   - Reg diet, bowel regimen.   - Recommended to patient to remain soft/liquid diet until + flatus.      Renal / :   - CKD stage III. Creat 2.12.     Heme:   - Hgb 8's. Plts 119    Endo:   - Sliding scale insulin   - Holding metformin and allopurinol for elevated Creatinine    PPX:   - Protonix    Anticoagulation:   - Hep gtt low intensity.   - Coumadin for LVAD, goal 2-3.  INR 1.53    Dispo:   - 6C since 6/22      Staff surgeons have been informed of changes through both  verbal and written communication.      Nash Amado PA-C  Cardiothoracic Surgery  Pager [phone]    9:39 AM   June 23, 2017      Patient seen and examined. Continue anticoagulation with heparin and coumadin. Continue current management plan. Agree with the findings in the Advanced Care Provider's note. I spent a total of 25 minutes examining the patient, reviewing investigations and therapeutic counseling.

## 2017-06-23 NOTE — PLAN OF CARE
Problem: Goal Outcome Summary  Goal: Goal Outcome Summary  OT: cancel, pt fatigued after busy day and just being seen by PT.

## 2017-06-23 NOTE — PROVIDER NOTIFICATION
At 0435 patient's converted to SVT HR 150s. Pt. was sleeping at the time and asymptomatic. Pt. was instructed to bear down and successfully converted back to V-pacing HR 90s (EKG confirmed). Sustained SVT <10 minutes. VSS. LVAD #s wnl. Cards and Surgery Cross-cover notified.

## 2017-06-23 NOTE — PLAN OF CARE
Problem: Ventricular Assist Device (Adult)  Goal: Signs and Symptoms of Listed Potential Problems Will be Absent or Manageable (Ventricular Assist Device)  Signs and symptoms of listed potential problems will be absent or manageable by discharge/transition of care (reference Ventricular Assist Device (Adult) CPG).   Outcome: Improving  D/He is up in the chair, and wife is here. He continues on Heparin drip. Says that LVAD education today was helpful, and learned a lot of things. System check was done but no dressing change yet. CT x2 removed on days, has 2 more left in place. He continues to take IV Dilaudid and po oxycodone for pain. He sleeps between the meds. He had one tachy episode on evening, and nights and several on days today. Late today had extra MG, KCL and IV Metropolol.   A/Now rate is good with some PVC's, but paced with P waves present.  I/reminded about use of I/S  P/monitor for changes  A/He continues to ask for Dilaudid whenever he wakes up, and then falls back to sleep.  I/I keep offering oxycodone when it is available (now every 3 hours), but before and after the po pain med he insists he needs Dilaudid

## 2017-06-23 NOTE — PLAN OF CARE
Problem: Ventricular Assist Device (Adult)  Goal: Signs and Symptoms of Listed Potential Problems Will be Absent or Manageable (Ventricular Assist Device)  Signs and symptoms of listed potential problems will be absent or manageable by discharge/transition of care (reference Ventricular Assist Device (Adult) CPG).   Outcome: No Change  D/I: Monitor shows A paced, PVC with bigen/trigem. VAD values within patient norms. 1 episode SVT-returned to V paced with valsalva. Into a-flib/flutter at 1500 with HR 150s, changed morphology at 1504. VSS. PI decreased to 3.8. PA notified. Electrolytes replaced. Received IV metoprolol. To start on PO metoprolol this PM. Continues on heparin drip at 1300 units/hour per protocol. Off for 2 hours after CTs removed. Restarted at protocol rate. ECHO completed. 2 mediastinal CTs removed this AM. Up in chair after noon. VAD teaching done from 1-3 with patient and wife by VD coordinator. C/O much pain at incisional and CT sites. Poor PO intake. Relieved for short periods of time with IV hydromorphone. Changed to PO oxy/tylenol with scheduled tylenol this afternoon. See flowsheets for assessments and additional data.  A: Continued pain relieved with IV pain meds. Recurrent a-fib/flutter (occurred last evening). Normal VAD values.   P: Assess and treat pain as indicated. Monitor rhythm and rate and medicate as ordered. Assess patient response to rhythm. Continue VAD education. Encourage PO intake and increased activity. Continue current cares and notify providers with questions or concerns.     06/23/17 1530   Ventricular Assist Device   Problems Assessed (Ventricular Assist Device) all   Problems Present (Ventricular Assist Device) situational response;hemodynamic instability

## 2017-06-23 NOTE — PLAN OF CARE
Problem: Goal Outcome Summary  Goal: Goal Outcome Summary  Outcome: No Change  D: S/p LVAD HM II 6/19  I/A: A&Ox4. VSS on 3L/Cpap overnight. V-paced 90s (<10 min sustained SVT HR 150s at 0435 resolved by valsalva see provider notification note). LVAD #s wnl, no alarms noted. C/o incisional pain partially relieved by PRN tylenol, oxycodone, and dilaudid (strongly encouraged pt. to take oral pain meds instead of relying on IV). Heparin gtt therapeutic and infusing at 1300 units/hr. Mediastinal CT x2, to suxn/one pleuravac, peyman CDI, minimal serosanguinous output. Pleural CT x2 to suxn/one pleuravac, peyman CDI, minimal serosanguinous output. Adequate uop. No BM since 6/18. Up with 2-assist. Appeared to sleep comfortably overnight.   P: Continue to monitor and notify CVTS with questions/concerns.

## 2017-06-23 NOTE — PLAN OF CARE
Problem: Goal Outcome Summary  Goal: Goal Outcome Summary  PT 6C: Pt very fatigued from busy day today - though agreeable to short therapy session. Completes sit<>stand transfers with ModA of 2 and heart pillow to maintain sternal precautions. Completes pre-gait activities while standing with fww. Tolerates well, though very fatigued upon completion.      Recommend discharge to TCU once medically appropriate to progress strengthening and IND with functional mobility.

## 2017-06-23 NOTE — PROGRESS NOTES
CVTS:     A-fib this afternoon, HD stable, maps in 90's.   Replacing Mag (2 g IV). PO potassium.   Discussed with Cards, ok for low dose metoprolol     Nash Amado PA-C  Cardiothoracic Surgery  Pager [phone]    3:08 PM   June 23, 2017

## 2017-06-23 NOTE — PROGRESS NOTES
CLINICAL NUTRITION SERVICES     Nutrition Prescription    Recommendations already ordered by Registered Dietitian (RD):  Oral supplements    Future/Additional Recommendations:  For all recommendations, see prior nutrition notes     INTERVENTIONS:  Implementation:  Medical food supplement therapy: Pt requested metcalf Ensure Clear yesterday and he has at least one supplement in his room. Received request from RN/team to order oral supplements. Ordered berry Ensure Clear at 10:00, 14:00, and HS.     Follow up/Monitoring:  Will continue to follow pt.    Sana Costello, MS, RD, LD, [facility]   6C Pgr:  [phone]

## 2017-06-23 NOTE — PROGRESS NOTES
Cardiology Progress Note     Events and interval changes in past 24 hours:   While sleeping had SVT HR 150s. 5 minutes. Stopped with valsalva  Denies any symptoms from SVT, orthopnea, dyspnea, palpitations or presyncope  No vad alarms    OBJECTIVE FINDINGS:  Temp: 98.6  F (37  C) Temp  Min: 98.1  F (36.7  C)  Max: 98.8  F (37.1  C)  Resp: 18 Resp  Min: 18  Max: 18  SpO2: 100 % SpO2  Min: 96 %  Max: 100 % 3LNC Bipap at night    No Data Recorded  Heart Rate: 91 Heart Rate  Min: 86  Max: 154  BP: 90/76 Systolic (24hrs), Av , Min:80 , Max:104     MAPs 70-90s    HM2 VAD flow 5 PI 4.5-5.5 speed 9000 power 5    Gen: alert  Resp: clear to auscultation bilaterally, no crackles or wheezing   CV: VAD hum, No JVP appreciated while sitting position  Abd:soft, nontender,     1.1/1.1 UO yest  mediastinal 1200 pleural 60 net -0.3  today 0.2/0.25 urine  mediastinal 70 pleural 50 net -0.2  -2 net for admission    Vitals:    17 0500 17 0515 17 0415 17 0600   Weight: 112.1 kg (247 lb 2.2 oz) 112.8 kg (248 lb 10.9 oz) 114.5 kg (252 lb 6.8 oz) 110.5 kg (243 lb 9.7 oz)    17 0501   Weight: 114 kg (251 lb 4.8 oz)     Inhaled NO    Warfarin Therapy Reminder       HEParin 1,300 Units/hr (17 0001)     Reason beta blocker order not selected       dextrose 5% and 0.45% NaCl 30 mL/hr at 17 0800       cyclobenzaprine  10 mg Oral or Feeding Tube TID     pantoprazole  40 mg Oral QAM     insulin aspart  1-7 Units Subcutaneous TID AC     insulin aspart  1-5 Units Subcutaneous At Bedtime     ipratropium - albuterol 0.5 mg/2.5 mg/3 mL  3 mL Nebulization Q6H     aspirin  81 mg Oral or Feeding Tube Daily     traZODone  50 mg Oral At Bedtime     sodium chloride (PF)  3 mL Intracatheter Q8H     insulin aspart   Subcutaneous TID w/meals     mupirocin  1 g Both Nostrils BID     senna-docusate  1-2 tablet Oral BID     pneumococcal vaccine  0.5 mL Intramuscular Prior to discharge   albuterol, glucose **OR**  dextrose **OR** glucagon, HYDROmorphone, oxyCODONE, Warfarin Therapy Reminder, heparin, naloxone, lidocaine, lidocaine 4%, sodium chloride (PF), Reason beta blocker order not selected, insulin aspart, potassium chloride, potassium chloride, potassium chloride, potassium chloride with lidocaine, potassium chloride, magnesium sulfate, magnesium sulfate, potassium phosphate (KPHOS) in D5W IV, potassium phosphate (KPHOS) in D5W IV, potassium phosphate (KPHOS) in D5W IV, potassium phosphate (KPHOS) in D5W IV, potassium phosphate (KPHOS) in D5W IV, ondansetron **OR** ondansetron, acetaminophen **OR** acetaminophen    Albumin 3.5  CMP  Recent Labs  Lab 06/22/17 0418 06/21/17  1921 06/21/17  1559 06/21/17  0359 06/20/17  1342  06/20/17  0439 06/20/17  0131    138  --  141 140  --  141 139   POTASSIUM 4.2 4.0 4.3 4.2 4.4  4.4  < > 4.7 5.1   CHLORIDE 105 108  --  108 107  --  106 104   CO2 23 21  --  23 26  --  24 24   ANIONGAP 9 9  --  10 7  --  11 10   * 116*  --  114* 124*  --  186* 186*   BUN 34* 29  --  28 28  --  26 25   CR 1.93* 1.63*  --  1.48* 1.57*  --  1.76* 1.49*   GFRESTIMATED 36* 43*  --  48* 45*  --  40* 48*   GFRESTBLACK 43* 52*  --  59* 55*  --  48* 58*   QAMAR 9.1 8.3*  --  8.8 8.7  --  8.7 9.2   MAG 2.1  --   --  2.4* 2.3  --  2.1 2.0   PHOS 4.3  --   --  4.0  --   --  3.1 4.0   < > = values in this interval not displayed.  CBC    Recent Labs  Lab 06/22/17 0418 06/21/17  1559 06/21/17  0359 06/20/17  2203 06/20/17  1342 06/20/17  1003 06/20/17  0439   WBC 13.1*  --  11.8*  --  13.0*  --  17.2*   RBC 3.04*  --  2.99*  --  2.80*  --  3.00*   HGB 8.9* 9.5* 8.8* 8.3* 8.1* 8.4* 8.7*   HCT 28.1*  --  27.5*  --  25.5* 25.5* 27.5*   MCV 92  --  92  --  91  --  92   MCH 29.3  --  29.4  --  28.9  --  29.0   MCHC 31.7  --  32.0  --  31.8  --  31.6   RDW 14.4  --  14.4  --  13.9  --  13.5   * 104* 115*  --  117*  --  149*     INR    Recent Labs  Lab 06/22/17  0418 06/21/17  0359 06/20/17  1343  06/20/17  0439   INR 1.44* 1.43* 1.49* 1.44*     Arterial Blood Gas    Recent Labs  Lab 06/22/17  0415 06/21/17  1559 06/21/17  0900 06/21/17  0657   PH 7.35 7.34* 7.39 7.37   PCO2 43 43 33* 37   PO2 84 114* 124* 137*   HCO3 24 23 20* 22   O2PER 3L  3L 4L 4L 40     Imaging and other studies:  EKG: Vpaced rhythm    Echocardiogram: before LVAD 6/12  Severe left ventricular dilation is present. LVIDd 8.0 cm.  Severe diffuse hypokinesis is present. Posterior wall akinesis is present.  The Ejection Fraction is estimated at 20-25%.Traced Biplane LVEF is 23%.  Moderate-Severe functional Mitral Regurgitation. There is tenting of the mitral leaflets with restriction of the posterior mitral leaflet. ERO is 0.3  cm^2. RV 43 ml.  Mild right ventricular dilation is present. Global right ventricular function is normal.  Dilation of the inferior vena cava is present with normal respiratory variation in diameter.    Echo after LVAD 6/23  Interpretation Summary  HM 2 at 9000 RPM.     LVAD cannula was seen in the expected anatomic position in the LV apex. LV end  diastolic diameter is 5.4cm. The Ejection Fraction is estimated at <20%.  The aortic valve opens intermittently.  Septum normal.  The inferior vena cava was normal in size with preserved respiratory  variability.    Chest x-ray: not from today  Yesterday: lungs clear,    Device settings DDDR  VT/VF ATP and defib, 188  monitor    ASSESSMENT:    62M h/o NICM (EF 20%) s/p Bi-V ICD, severe MR, VT, paroxsymal afib, DM, HTN, CKD, CECILIA, MDD, admitted for gradual shortness of breath, likely 2/2 to inadequate maintenance diuretics. Improving with BiPAP.     # NICM s/p CRT-D s/p HM2 LVAD as DT 6/20, s/p MAI-N-yaaprpaj  in May, extubated 6/21  # Normal RV function prior to LVAD  # Severe functional MR    # WALTER-unclear etiology    # Gout-left elbow    # hx of VT  # pAF  #HTN  #CKD-basline Cr 1.4  #CECILIA    Recommendations  -workup cause of WALTER (UA, urine urea/Cr).  Doesn't look hypervolemic on exam. Hold diuretics  - If SVT is persistent, start metoprolol  -Goal MAP<90.   - aspirin 81, heparin/warfarin  -optimize LVAD speed: increased to 9000 on 6/20  -needs post VAD echo at some point to assess RV function      Will staff with Dr. Ramiro Gómez  General cards fellow, PGY4  Pager [phone]    Attending Attestation:  Patient seen and examined by me with the team. I have performed all pertinent elements of the physical examination and reviewed the note above. I have reviewed pertinent laboratory, echocardiographic, imaging, and cardiac catheterization results. I agree with the plan of care as described in this note.    Alberto Rubio MD, PhD

## 2017-06-24 ENCOUNTER — APPOINTMENT (OUTPATIENT)
Dept: PHYSICAL THERAPY | Facility: CLINIC | Age: 62
DRG: 001 | End: 2017-06-24
Attending: PHYSICIAN ASSISTANT
Payer: COMMERCIAL

## 2017-06-24 ENCOUNTER — APPOINTMENT (OUTPATIENT)
Dept: OCCUPATIONAL THERAPY | Facility: CLINIC | Age: 62
DRG: 001 | End: 2017-06-24
Attending: PHYSICIAN ASSISTANT
Payer: COMMERCIAL

## 2017-06-24 LAB
ANION GAP SERPL CALCULATED.3IONS-SCNC: 7 MMOL/L (ref 3–14)
APTT PPP: 128 SEC (ref 22–37)
APTT PPP: NORMAL SEC (ref 22–37)
BUN SERPL-MCNC: 36 MG/DL (ref 7–30)
CALCIUM SERPL-MCNC: 8.6 MG/DL (ref 8.5–10.1)
CHLORIDE SERPL-SCNC: 104 MMOL/L (ref 94–109)
CO2 SERPL-SCNC: 24 MMOL/L (ref 20–32)
CREAT SERPL-MCNC: 1.9 MG/DL (ref 0.66–1.25)
ERYTHROCYTE [DISTWIDTH] IN BLOOD BY AUTOMATED COUNT: 13.8 % (ref 10–15)
GFR SERPL CREATININE-BSD FRML MDRD: 36 ML/MIN/1.7M2
GLUCOSE BLDC GLUCOMTR-MCNC: 131 MG/DL (ref 70–99)
GLUCOSE BLDC GLUCOMTR-MCNC: 132 MG/DL (ref 70–99)
GLUCOSE BLDC GLUCOMTR-MCNC: 159 MG/DL (ref 70–99)
GLUCOSE BLDC GLUCOMTR-MCNC: 205 MG/DL (ref 70–99)
GLUCOSE SERPL-MCNC: 113 MG/DL (ref 70–99)
HCT VFR BLD AUTO: 25.9 % (ref 40–53)
HGB BLD-MCNC: 8.3 G/DL (ref 13.3–17.7)
INR PPP: 2.03 (ref 0.86–1.14)
INR PPP: NORMAL (ref 0.86–1.14)
LMWH PPP CHRO-ACNC: 0.24 IU/ML
LMWH PPP CHRO-ACNC: NORMAL IU/ML
MAGNESIUM SERPL-MCNC: 2.4 MG/DL (ref 1.6–2.3)
MCH RBC QN AUTO: 29.2 PG (ref 26.5–33)
MCHC RBC AUTO-ENTMCNC: 32 G/DL (ref 31.5–36.5)
MCV RBC AUTO: 91 FL (ref 78–100)
PHOSPHATE SERPL-MCNC: 2.9 MG/DL (ref 2.5–4.5)
PLATELET # BLD AUTO: 154 10E9/L (ref 150–450)
POTASSIUM SERPL-SCNC: 4.2 MMOL/L (ref 3.4–5.3)
RBC # BLD AUTO: 2.84 10E12/L (ref 4.4–5.9)
SODIUM SERPL-SCNC: 135 MMOL/L (ref 133–144)
WBC # BLD AUTO: 8.4 10E9/L (ref 4–11)

## 2017-06-24 PROCEDURE — 83735 ASSAY OF MAGNESIUM: CPT | Performed by: SURGERY

## 2017-06-24 PROCEDURE — 85730 THROMBOPLASTIN TIME PARTIAL: CPT | Performed by: SURGERY

## 2017-06-24 PROCEDURE — 85520 HEPARIN ASSAY: CPT | Performed by: SURGERY

## 2017-06-24 PROCEDURE — 00000146 ZZHCL STATISTIC GLUCOSE BY METER IP

## 2017-06-24 PROCEDURE — 94640 AIRWAY INHALATION TREATMENT: CPT

## 2017-06-24 PROCEDURE — 25000132 ZZH RX MED GY IP 250 OP 250 PS 637: Performed by: THORACIC SURGERY (CARDIOTHORACIC VASCULAR SURGERY)

## 2017-06-24 PROCEDURE — 97116 GAIT TRAINING THERAPY: CPT | Mod: GP

## 2017-06-24 PROCEDURE — 99232 SBSQ HOSP IP/OBS MODERATE 35: CPT | Mod: GC | Performed by: INTERNAL MEDICINE

## 2017-06-24 PROCEDURE — 40000275 ZZH STATISTIC RCP TIME EA 10 MIN

## 2017-06-24 PROCEDURE — 97110 THERAPEUTIC EXERCISES: CPT | Mod: GP

## 2017-06-24 PROCEDURE — 40000133 ZZH STATISTIC OT WARD VISIT: Performed by: OCCUPATIONAL THERAPIST

## 2017-06-24 PROCEDURE — 25000132 ZZH RX MED GY IP 250 OP 250 PS 637: Performed by: PHYSICIAN ASSISTANT

## 2017-06-24 PROCEDURE — 84100 ASSAY OF PHOSPHORUS: CPT | Performed by: SURGERY

## 2017-06-24 PROCEDURE — 25000132 ZZH RX MED GY IP 250 OP 250 PS 637: Performed by: SURGERY

## 2017-06-24 PROCEDURE — 40000193 ZZH STATISTIC PT WARD VISIT

## 2017-06-24 PROCEDURE — 97530 THERAPEUTIC ACTIVITIES: CPT | Mod: GO | Performed by: OCCUPATIONAL THERAPIST

## 2017-06-24 PROCEDURE — 36415 COLL VENOUS BLD VENIPUNCTURE: CPT | Performed by: SURGERY

## 2017-06-24 PROCEDURE — 25000125 ZZHC RX 250: Performed by: SURGERY

## 2017-06-24 PROCEDURE — 97530 THERAPEUTIC ACTIVITIES: CPT | Mod: GP

## 2017-06-24 PROCEDURE — 25000128 H RX IP 250 OP 636: Performed by: PHYSICIAN ASSISTANT

## 2017-06-24 PROCEDURE — 25000132 ZZH RX MED GY IP 250 OP 250 PS 637: Performed by: STUDENT IN AN ORGANIZED HEALTH CARE EDUCATION/TRAINING PROGRAM

## 2017-06-24 PROCEDURE — 85610 PROTHROMBIN TIME: CPT | Performed by: SURGERY

## 2017-06-24 PROCEDURE — 80048 BASIC METABOLIC PNL TOTAL CA: CPT | Performed by: SURGERY

## 2017-06-24 PROCEDURE — 25000128 H RX IP 250 OP 636: Performed by: STUDENT IN AN ORGANIZED HEALTH CARE EDUCATION/TRAINING PROGRAM

## 2017-06-24 PROCEDURE — 94640 AIRWAY INHALATION TREATMENT: CPT | Mod: 76

## 2017-06-24 PROCEDURE — 85027 COMPLETE CBC AUTOMATED: CPT | Performed by: SURGERY

## 2017-06-24 PROCEDURE — 21400003 ZZH R&B CCU CRITICAL UMMC

## 2017-06-24 RX ORDER — POLYETHYLENE GLYCOL 3350 17 G/17G
17 POWDER, FOR SOLUTION ORAL 2 TIMES DAILY
Status: DISCONTINUED | OUTPATIENT
Start: 2017-06-24 | End: 2017-06-30 | Stop reason: HOSPADM

## 2017-06-24 RX ORDER — BISACODYL 10 MG
10 SUPPOSITORY, RECTAL RECTAL DAILY
Status: DISCONTINUED | OUTPATIENT
Start: 2017-06-24 | End: 2017-06-30 | Stop reason: HOSPADM

## 2017-06-24 RX ORDER — WARFARIN SODIUM 7.5 MG/1
7.5 TABLET ORAL
Status: COMPLETED | OUTPATIENT
Start: 2017-06-24 | End: 2017-06-24

## 2017-06-24 RX ORDER — HYDROMORPHONE HYDROCHLORIDE 2 MG/1
2-4 TABLET ORAL
Status: DISCONTINUED | OUTPATIENT
Start: 2017-06-24 | End: 2017-06-30 | Stop reason: HOSPADM

## 2017-06-24 RX ORDER — METOPROLOL TARTRATE 25 MG/1
25 TABLET, FILM COATED ORAL 2 TIMES DAILY
Status: DISCONTINUED | OUTPATIENT
Start: 2017-06-24 | End: 2017-06-30 | Stop reason: HOSPADM

## 2017-06-24 RX ORDER — CYCLOBENZAPRINE HCL 5 MG
5-10 TABLET ORAL 3 TIMES DAILY PRN
Status: DISCONTINUED | OUTPATIENT
Start: 2017-06-24 | End: 2017-06-30 | Stop reason: HOSPADM

## 2017-06-24 RX ADMIN — ACETAMINOPHEN 650 MG: 325 TABLET, FILM COATED ORAL at 01:11

## 2017-06-24 RX ADMIN — TRAZODONE HYDROCHLORIDE 50 MG: 50 TABLET ORAL at 21:44

## 2017-06-24 RX ADMIN — POLYETHYLENE GLYCOL 3350 17 G: 17 POWDER, FOR SOLUTION ORAL at 10:00

## 2017-06-24 RX ADMIN — HYDROMORPHONE HYDROCHLORIDE 4 MG: 2 TABLET ORAL at 13:22

## 2017-06-24 RX ADMIN — HEPARIN SODIUM 1300 UNITS/HR: 10000 INJECTION, SOLUTION INTRAVENOUS at 14:45

## 2017-06-24 RX ADMIN — IPRATROPIUM BROMIDE AND ALBUTEROL SULFATE 3 ML: .5; 3 SOLUTION RESPIRATORY (INHALATION) at 09:18

## 2017-06-24 RX ADMIN — OXYCODONE HYDROCHLORIDE 10 MG: 5 TABLET ORAL at 04:13

## 2017-06-24 RX ADMIN — ASPIRIN 81 MG CHEWABLE TABLET 81 MG: 81 TABLET CHEWABLE at 09:00

## 2017-06-24 RX ADMIN — ACETAMINOPHEN 650 MG: 325 TABLET, FILM COATED ORAL at 07:48

## 2017-06-24 RX ADMIN — IPRATROPIUM BROMIDE AND ALBUTEROL SULFATE 3 ML: .5; 3 SOLUTION RESPIRATORY (INHALATION) at 02:25

## 2017-06-24 RX ADMIN — PANTOPRAZOLE SODIUM 40 MG: 40 TABLET, DELAYED RELEASE ORAL at 09:00

## 2017-06-24 RX ADMIN — OXYCODONE HYDROCHLORIDE 10 MG: 5 TABLET ORAL at 01:11

## 2017-06-24 RX ADMIN — HYDROMORPHONE HYDROCHLORIDE 0.5 MG: 1 INJECTION, SOLUTION INTRAMUSCULAR; INTRAVENOUS; SUBCUTANEOUS at 00:28

## 2017-06-24 RX ADMIN — HYDROMORPHONE HYDROCHLORIDE 4 MG: 2 TABLET ORAL at 19:58

## 2017-06-24 RX ADMIN — ACETAMINOPHEN 650 MG: 325 TABLET, FILM COATED ORAL at 19:58

## 2017-06-24 RX ADMIN — SENNOSIDES AND DOCUSATE SODIUM 2 TABLET: 8.6; 5 TABLET ORAL at 19:59

## 2017-06-24 RX ADMIN — METOPROLOL TARTRATE 25 MG: 25 TABLET, FILM COATED ORAL at 09:00

## 2017-06-24 RX ADMIN — CITALOPRAM HYDROBROMIDE 20 MG: 20 TABLET ORAL at 09:00

## 2017-06-24 RX ADMIN — IPRATROPIUM BROMIDE AND ALBUTEROL SULFATE 3 ML: .5; 3 SOLUTION RESPIRATORY (INHALATION) at 13:29

## 2017-06-24 RX ADMIN — SENNOSIDES AND DOCUSATE SODIUM 2 TABLET: 8.6; 5 TABLET ORAL at 09:00

## 2017-06-24 RX ADMIN — HYDROMORPHONE HYDROCHLORIDE 4 MG: 2 TABLET ORAL at 10:15

## 2017-06-24 RX ADMIN — METOPROLOL TARTRATE 25 MG: 25 TABLET, FILM COATED ORAL at 19:58

## 2017-06-24 RX ADMIN — WARFARIN SODIUM 7.5 MG: 7.5 TABLET ORAL at 18:27

## 2017-06-24 RX ADMIN — MONTELUKAST SODIUM 10 MG: 10 TABLET, FILM COATED ORAL at 21:44

## 2017-06-24 RX ADMIN — HYDROMORPHONE HYDROCHLORIDE 4 MG: 2 TABLET ORAL at 22:53

## 2017-06-24 RX ADMIN — HYDROMORPHONE HYDROCHLORIDE 4 MG: 2 TABLET ORAL at 16:48

## 2017-06-24 RX ADMIN — OXYCODONE HYDROCHLORIDE 10 MG: 5 TABLET ORAL at 07:49

## 2017-06-24 RX ADMIN — INSULIN ASPART 1 UNITS: 100 INJECTION, SOLUTION INTRAVENOUS; SUBCUTANEOUS at 18:25

## 2017-06-24 RX ADMIN — ACETAMINOPHEN 650 MG: 325 TABLET, FILM COATED ORAL at 13:22

## 2017-06-24 ASSESSMENT — PAIN DESCRIPTION - DESCRIPTORS: DESCRIPTORS: ACHING

## 2017-06-24 NOTE — PLAN OF CARE
Problem: Ventricular Assist Device (Adult)  Goal: Signs and Symptoms of Listed Potential Problems Will be Absent or Manageable (Ventricular Assist Device)  Signs and symptoms of listed potential problems will be absent or manageable by discharge/transition of care (reference Ventricular Assist Device (Adult) CPG).   Outcome: Improving  D/I: Monitor shows A paced, PVC with bigem/trigem. No episodes a-fib noted. VAD values within patient norms; 2 extra sounds during system check toward end of testing, but did not happen again with additional testing. Continues on heparin drip at 1300 units/hour per protocol. Continues with 2 pleural CTs to suction with minimal output. Up in chair after noon this AM and again after therapy this afternoon. C/O pain at CT and VAD site. Slightly decreased with oxycodone. Changed to PO hydromorphone with fair relief. Poor PO intake. Eating food from home. Also willing to take ensure clear. No BM, received PO and to get supp if no BM this afternoon. Wife present this afternoon. See flowsheets for assessments and additional data.  A: Continued pain slightly relieved with PO pain meds. Normal VAD values.   P: Assess and treat pain as indicated. Transitioning to PO medications. Monitor rhythm and rate and medicate as ordered. Continue VAD education. Encourage PO intake and increased activity. Continue current cares and notify providers with questions or concerns.    06/24/17 1523   Ventricular Assist Device   Problems Assessed (Ventricular Assist Device) all   Problems Present (Ventricular Assist Device) other (see comments);situational response  (pain)

## 2017-06-24 NOTE — PLAN OF CARE
Problem: Ventricular Assist Device (Adult)  Goal: Signs and Symptoms of Listed Potential Problems Will be Absent or Manageable (Ventricular Assist Device)  Signs and symptoms of listed potential problems will be absent or manageable by discharge/transition of care (reference Ventricular Assist Device (Adult) CPG).   Outcome: Improving  D/he continues on Heparin drip and was up with therapy this afternoon. Wife was here. He is taking oral Dilaudid today  I/so far tonight he had taken only po Dilaudid, although he asked for IV and po once at the same time and this was declined by another RN, We have tried to give pills pretty close to when they are available to assist him today to see how the pain can be controlled with oral pills.  P/continue to try to cover him with oral to assist him with pain relief    No tachy bursts   A/I did not see any yesterday evening or tonight and was not notified by MT of any last night or tonight  P/monitor for changes

## 2017-06-24 NOTE — PLAN OF CARE
Problem: Goal Outcome Summary  Goal: Goal Outcome Summary  Outcome: No Change   S/p LVAD HM II 6/19, A&Ox4. VSS on 3L/Cpap overnight. V-paced 90s. LVAD #s wnl, no alarms noted. C/o incisional pain partially relieved by PRN tylenol, oxycodone, and dilaudid. Convinced patient to use 10mg oxy instead of IV dilauded as often. Heparin gtt therapeutic and infusing at 1300 units/hr.  CT x2 to, drsgs CDI, minimal serosanguinous output. Adequate uop. No BM since 6/18. Up with 2-assist. Appeared to sleep comfortably overnight. No other issues noted overnight. Will continue to monitor and address as needed.

## 2017-06-24 NOTE — PLAN OF CARE
Problem: Goal Outcome Summary  Goal: Goal Outcome Summary  PT 6C: patient very limited by pain during this session, reports high levels of motivation however unable to fully participate secondary to pain. Patient performs sit to stands x2 with CGA, sit to sup with Betsy for legs. Engaged in ambulation ~5 feet x2 in room with FWW. Adheres to precautions throughout session.      Recommend discharge to TCU when medically appropriate to continue progressing activity tolerance and strength for improved functional mobility.

## 2017-06-24 NOTE — PROGRESS NOTES
CVTS Daily Note  6/23/2017  Attending: Ronnie Quigley MD    S:   No overnight events. Reports poor pain control with oxy.    O:   Vitals:    06/23/17 2108 06/24/17 0030 06/24/17 0500 06/24/17 0800   BP:  108/71  (!) (P) 82/70   BP Location: Right arm Right arm  (P) Right arm   Pulse:       Resp: 18 18 18    Temp:  98  F (36.7  C)  (P) 98.1  F (36.7  C)   TempSrc:  Oral  (P) Oral   SpO2:  96% 96%    Weight:       Height:         Vitals:    06/21/17 0415 06/22/17 0600 06/23/17 0501   Weight: 114.5 kg (252 lb 6.8 oz) 110.5 kg (243 lb 9.7 oz) 114 kg (251 lb 4.8 oz)     + 0 kg since admit    Intake/Output Summary (Last 24 hours) at 06/23/17 0939  Last data filed at 06/23/17 0600   Gross per 24 hour   Intake              755 ml   Output              784 ml   Net              -29 ml       MAPs: 70 - 82  Gen: AAO x 3, pleasant, NAD  CV: LVAD humming, no murmurs, rubs, or gallops.   Pulm: CTA, no rhonchi or wheezes  Abd: soft, non-tender, no guarding  Ext: trace peripheral edema, non-pitting  Incision: overall clean, dry, intact, no erythema. Inferior 2 cm sternal incision moist with only superficial dehiscence   Chest Tube sites: dressings clean and dry, serosanguinous, no air leak, output 60 cc / 50 cc   * removed mediastinal tubes without immediate complication.   Lines: driveline dressing clean and dry, marked 6/21 (2 days old)  LVAD: speed 9000, flow 5, PI 6.3, power 5.2.       Labs:  [facility]    Recent Labs  Lab 06/24/17  0736 06/23/17  0626 06/22/17  0418 06/21/17  1921    137 138 138   POTASSIUM 4.2 4.0 4.2 4.0   CHLORIDE 104 104 105 108   QAMAR 8.6 8.6 9.1 8.3*   CO2 24 25 23 21   BUN 36* 37* 34* 29   CR 1.90* 2.12* 1.93* 1.63*   * 135* 121* 116*     CBC    Recent Labs  Lab 06/24/17  0736 06/23/17  1002 06/22/17  0418 06/21/17  1559 06/21/17  0359   WBC 8.4 8.8 13.1*  --  11.8*   RBC 2.84* 2.83* 3.04*  --  2.99*   HGB 8.3* 8.4* 8.9* 9.5* 8.8*   HCT 25.9* 26.0* 28.1*  --  27.5*   MCV 91 92 92  --  92   MCH 29.2  29.7 29.3  --  29.4   MCHC 32.0 32.3 31.7  --  32.0   RDW 13.8 14.2 14.4  --  14.4    119* 113* 104* 115*     INR    Recent Labs  Lab 06/24/17  0736 06/23/17  0626 06/22/17  0418 06/21/17  0359   INR Unsatisfactory specimen - possible contaminationANITA RN ON 6C AT 0833 06/24/17CORRECTED ON 06/24 AT 0835: PREVIOUSLY REPORTED AS 1.99 1.53* 1.44* 1.43*      Hepatic Panel   Lab Results   Component Value Date    AST 23 06/15/2017     Lab Results   Component Value Date    ALT 26 06/15/2017     Lab Results   Component Value Date    ALBUMIN 3.8 06/15/2017     GLUCOSE:     Recent Labs  Lab 06/24/17  1001 06/24/17  0736 06/23/17  2106 06/23/17  1306 06/23/17  0849 06/23/17  0626 06/23/17  0202 06/22/17  2155  06/22/17  0418  06/21/17  1921  06/21/17  0359  06/20/17  1342   GLC  --  113*  --   --   --  135*  --   --   --  121*  --  116*  --  114*  --  124*   *  --  149* 194* 173*  --  136* 183*  < >  --   < >  --   < >  --   < >  --    < > = values in this interval not displayed.      Imaging:  reviewed recent imaging      A/P:   Jim Willingham is a 62 year old male with NICM (EF 20%) s/p Bi-V ICD, severe MR, VT, paroxsymal afib, DM, HTN, CKD, CECILIA, who is s/p HeartMate II left ventricular assist device placement 6/19 with Dr Quigley. IABP removed POD #1.     Neuro:   - Evaristo APAP, prn dilaudidq 3, prn IV dilaudid, prn flexeril   - PTA meds; Celexa, Trazodone    CV:   - HM II lvad as DT, stage D NYHA Class IIIB  - ASA 81 mg  - Nonsustained SVT/A-fib. Replace Lytes PRN. Metoprolol 25 mg BID per Cards II.     Pulm:   - Extubated POD #2.   - IS, deep breathe, Nebs q 6 hrs    ID:   - Completed jason-op ABx. Afebrile. WBC wnl.     GI / FEN:    - Hx gastric bypass, B12 supplement.   - Reg diet, bowel regimen.   - Recommended to patient to remain soft/liquid diet until + flatus.      Renal / :   - CKD stage III. Creat 2.12.     Heme:   - Hgb 8's. Plts 119    Endo:   - Sliding scale insulin   - Holding metformin and  allopurinol for elevated Creatinine    PPX:   - Protonix    Anticoagulation:   - Hep gtt low intensity.   - Coumadin for LVAD, goal 2-3.  INR pending    Dispo:   - 6C    Seen and discussed with Dr. Monet.    Myke Sanchez MD PGY-3  Surgery Resident    Patient seen and examined. Continue anticoagulation with heparin and coumadin. Continue current management plan. Agree with the findings in the Resident's note. I spent a total of 25 minutes examining the patient, reviewing investigations and therapeutic counseling.

## 2017-06-24 NOTE — PROGRESS NOTES
Cardiology Progress Note     Events and interval changes in past 24 hours:   Has been ambulating without dyspnea. No orthoponea, dyspnea, presyncoe  Pain at chest incision site    OBJECTIVE FINDINGS:  Temp: 98  F (36.7  C) Temp  Min: 98  F (36.7  C)  Max: 98.6  F (37  C)  Resp: 18 Resp  Min: 18  Max: 18  SpO2: 96 % SpO2  Min: 85 %  Max: 100 % ra Bipap at night  Pulse: 105 Pulse  Min: 105  Max: 105  Heart Rate: 83 Heart Rate  Min: 83  Max: 152  BP: 108/71 Systolic (24hrs), Av , Min:80 , Max:110     MAPs 70-90s    HM2 VAD flow 5 PI 5-6 speed 9000 power 5    Gen: alert  Resp: clear to auscultation bilaterally, no crackles or wheezing   CV: VAD hum, JVP 7cm  Abd:soft, nontender,     1.1/1.1 UO yest  mediastinal 1200 pleural 60 net -0.3  today 0.2/0.25 urine  mediastinal 70 pleural 50 net -0.2  -2 net for admission    Vitals:    17 0500 17 0515 17 0415 17 0600   Weight: 112.1 kg (247 lb 2.2 oz) 112.8 kg (248 lb 10.9 oz) 114.5 kg (252 lb 6.8 oz) 110.5 kg (243 lb 9.7 oz)    17 0501   Weight: 114 kg (251 lb 4.8 oz)     Inhaled NO    Warfarin Therapy Reminder       HEParin 1,300 Units/hr (17 1600)     Reason beta blocker order not selected       dextrose 5% and 0.45% NaCl 30 mL/hr at 17 0800       acetaminophen  650 mg Oral Q6H     citalopram  20 mg Oral Daily     cyanocobalamin  500 mcg Sublingual Q48H     montelukast  10 mg Oral At Bedtime     fluticasone-vilanterol  1 puff Inhalation Daily     metoprolol  12.5 mg Oral BID     pantoprazole  40 mg Oral QAM     insulin aspart  1-7 Units Subcutaneous TID AC     insulin aspart  1-5 Units Subcutaneous At Bedtime     ipratropium - albuterol 0.5 mg/2.5 mg/3 mL  3 mL Nebulization Q6H     aspirin  81 mg Oral or Feeding Tube Daily     traZODone  50 mg Oral At Bedtime     sodium chloride (PF)  3 mL Intracatheter Q8H     mupirocin  1 g Both Nostrils BID     senna-docusate  1-2 tablet Oral BID     pneumococcal vaccine  0.5 mL Intramuscular  Prior to discharge   oxyCODONE, cyclobenzaprine, albuterol, glucose **OR** dextrose **OR** glucagon, HYDROmorphone, Warfarin Therapy Reminder, heparin, naloxone, lidocaine, lidocaine 4%, sodium chloride (PF), Reason beta blocker order not selected, potassium chloride, potassium chloride, potassium chloride, potassium chloride with lidocaine, potassium chloride, magnesium sulfate, magnesium sulfate, potassium phosphate (KPHOS) in D5W IV, potassium phosphate (KPHOS) in D5W IV, potassium phosphate (KPHOS) in D5W IV, potassium phosphate (KPHOS) in D5W IV, potassium phosphate (KPHOS) in D5W IV, ondansetron **OR** ondansetron    Albumin 3.5  CMP  Recent Labs  Lab 06/23/17  0626 06/22/17 0418 06/21/17  1921 06/21/17  1559 06/21/17  0359 06/20/17  1342  06/20/17  0439    138 138  --  141 140  --  141   POTASSIUM 4.0 4.2 4.0 4.3 4.2 4.4  4.4  < > 4.7   CHLORIDE 104 105 108  --  108 107  --  106   CO2 25 23 21  --  23 26  --  24   ANIONGAP 9 9 9  --  10 7  --  11   * 121* 116*  --  114* 124*  --  186*   BUN 37* 34* 29  --  28 28  --  26   CR 2.12* 1.93* 1.63*  --  1.48* 1.57*  --  1.76*   GFRESTIMATED 32* 36* 43*  --  48* 45*  --  40*   GFRESTBLACK 39* 43* 52*  --  59* 55*  --  48*   QAMAR 8.6 9.1 8.3*  --  8.8 8.7  --  8.7   MAG 2.0 2.1  --   --  2.4* 2.3  --  2.1   PHOS 3.4 4.3  --   --  4.0  --   --  3.1   < > = values in this interval not displayed.  CBC    Recent Labs  Lab 06/23/17  1002 06/22/17 0418 06/21/17  1559 06/21/17  0359  06/20/17  1342   WBC 8.8 13.1*  --  11.8*  --  13.0*   RBC 2.83* 3.04*  --  2.99*  --  2.80*   HGB 8.4* 8.9* 9.5* 8.8*  < > 8.1*   HCT 26.0* 28.1*  --  27.5*  --  25.5*   MCV 92 92  --  92  --  91   MCH 29.7 29.3  --  29.4  --  28.9   MCHC 32.3 31.7  --  32.0  --  31.8   RDW 14.2 14.4  --  14.4  --  13.9   * 113* 104* 115*  --  117*   < > = values in this interval not displayed.  INR    Recent Labs  Lab 06/23/17  0626 06/22/17  0418 06/21/17  0359 06/20/17  1342   INR  1.53* 1.44* 1.43* 1.49*     Arterial Blood Gas    Recent Labs  Lab 06/23/17  0725 06/22/17  0415 06/21/17  1559 06/21/17  0900 06/21/17  0657   PH  --  7.35 7.34* 7.39 7.37   PCO2  --  43 43 33* 37   PO2  --  84 114* 124* 137*   HCO3  --  24 23 20* 22   O2PER 3L 3L  3L 4L 4L 40     Imaging and other studies:  EKG: Vpaced rhythm    Echocardiogram: before LVAD 6/12  Severe left ventricular dilation is present. LVIDd 8.0 cm.  Severe diffuse hypokinesis is present. Posterior wall akinesis is present.  The Ejection Fraction is estimated at 20-25%.Traced Biplane LVEF is 23%.  Moderate-Severe functional Mitral Regurgitation. There is tenting of the mitral leaflets with restriction of the posterior mitral leaflet. ERO is 0.3  cm^2. RV 43 ml.  Mild right ventricular dilation is present. Global right ventricular function is normal.  Dilation of the inferior vena cava is present with normal respiratory variation in diameter.    Echo after LVAD 6/23  Interpretation Summary  HM 2 at 9000 RPM.     LVAD cannula was seen in the expected anatomic position in the LV apex. LV end  diastolic diameter is 5.4cm. The Ejection Fraction is estimated at <20%.  The aortic valve opens intermittently.  Septum normal.  The inferior vena cava was normal in size with preserved respiratory  Variability.  Mild RV dysfunction      Device settings DDDR  VT/VF ATP and defib, 188  monitor    ASSESSMENT:    62M h/o NICM (EF 20%) s/p Bi-V ICD, severe MR, VT, paroxsymal afib, DM, HTN, CKD, CECILIA, MDD, admitted for gradual shortness of breath, likely 2/2 to inadequate maintenance diuretics. Improving with BiPAP.     # NICM s/p CRT-D s/p HM2 LVAD as DT 6/20, s/p PLH-U-vctmobug  in May, extubated 6/21  # Mild RV function after LVAD, normal RV function after LVAD  # Severe functional MR    # WALTER-unclear etiology-likely hypoperfusion from surgery    # Gout-left elbow    # hx of VT  # pAF  #HTN  #CKD-basline Cr 1.4  #CECILIA    Recommendations  -  Monitor Cr  - SVT: metoprolol 12.5 BID  - Goal MAP<90.   - aspirin 81, heparin/warfarin  -optimize LVAD speed: increased to 9000 on 6/20    Will staff with Dr. Ramiro Gómez  General cards fellow, PGY4  Pager [phone]    Attending Attestation:  Patient seen and examined by me with the team. I have performed all pertinent elements of the physical examination and reviewed the note above. I have reviewed pertinent laboratory, echocardiographic, imaging, and cardiac catheterization results. I agree with the plan of care as described in this note.    Alberto Rubio MD, PhD

## 2017-06-24 NOTE — PLAN OF CARE
Problem: Goal Outcome Summary  Goal: Goal Outcome Summary  OT 6C: Patient cont to make progress in therapy. Worked on multiple sit<>stand with FWW and initially required min A then progressed to SBA/CGA. Patient ambulated to chair with FWW and CGA. Fatigued at end of session. Recommend discharge to TCU.

## 2017-06-25 ENCOUNTER — APPOINTMENT (OUTPATIENT)
Dept: PHYSICAL THERAPY | Facility: CLINIC | Age: 62
DRG: 001 | End: 2017-06-25
Attending: PHYSICIAN ASSISTANT
Payer: COMMERCIAL

## 2017-06-25 ENCOUNTER — APPOINTMENT (OUTPATIENT)
Dept: GENERAL RADIOLOGY | Facility: CLINIC | Age: 62
DRG: 001 | End: 2017-06-25
Attending: PHYSICIAN ASSISTANT
Payer: COMMERCIAL

## 2017-06-25 LAB
ANION GAP SERPL CALCULATED.3IONS-SCNC: 8 MMOL/L (ref 3–14)
APTT PPP: 157 SEC (ref 22–37)
BUN SERPL-MCNC: 34 MG/DL (ref 7–30)
CALCIUM SERPL-MCNC: 8.6 MG/DL (ref 8.5–10.1)
CHLORIDE SERPL-SCNC: 104 MMOL/L (ref 94–109)
CO2 SERPL-SCNC: 24 MMOL/L (ref 20–32)
CREAT SERPL-MCNC: 1.62 MG/DL (ref 0.66–1.25)
ERYTHROCYTE [DISTWIDTH] IN BLOOD BY AUTOMATED COUNT: 13.8 % (ref 10–15)
GFR SERPL CREATININE-BSD FRML MDRD: 44 ML/MIN/1.7M2
GLUCOSE BLDC GLUCOMTR-MCNC: 125 MG/DL (ref 70–99)
GLUCOSE BLDC GLUCOMTR-MCNC: 170 MG/DL (ref 70–99)
GLUCOSE BLDC GLUCOMTR-MCNC: 186 MG/DL (ref 70–99)
GLUCOSE SERPL-MCNC: 116 MG/DL (ref 70–99)
HCT VFR BLD AUTO: 25.8 % (ref 40–53)
HGB BLD-MCNC: 8.1 G/DL (ref 13.3–17.7)
INR PPP: 2.41 (ref 0.86–1.14)
MAGNESIUM SERPL-MCNC: 2.3 MG/DL (ref 1.6–2.3)
MCH RBC QN AUTO: 28.6 PG (ref 26.5–33)
MCHC RBC AUTO-ENTMCNC: 31.4 G/DL (ref 31.5–36.5)
MCV RBC AUTO: 91 FL (ref 78–100)
PHOSPHATE SERPL-MCNC: 2.9 MG/DL (ref 2.5–4.5)
PLATELET # BLD AUTO: 190 10E9/L (ref 150–450)
POTASSIUM SERPL-SCNC: 4.1 MMOL/L (ref 3.4–5.3)
RBC # BLD AUTO: 2.83 10E12/L (ref 4.4–5.9)
SODIUM SERPL-SCNC: 136 MMOL/L (ref 133–144)
WBC # BLD AUTO: 9.6 10E9/L (ref 4–11)

## 2017-06-25 PROCEDURE — 80048 BASIC METABOLIC PNL TOTAL CA: CPT | Performed by: SURGERY

## 2017-06-25 PROCEDURE — 85027 COMPLETE CBC AUTOMATED: CPT | Performed by: SURGERY

## 2017-06-25 PROCEDURE — 85610 PROTHROMBIN TIME: CPT | Performed by: SURGERY

## 2017-06-25 PROCEDURE — 40000275 ZZH STATISTIC RCP TIME EA 10 MIN

## 2017-06-25 PROCEDURE — 71010 XR CHEST PORT 1 VW: CPT

## 2017-06-25 PROCEDURE — 25000132 ZZH RX MED GY IP 250 OP 250 PS 637: Performed by: SURGERY

## 2017-06-25 PROCEDURE — 25000128 H RX IP 250 OP 636: Performed by: PHYSICIAN ASSISTANT

## 2017-06-25 PROCEDURE — 36415 COLL VENOUS BLD VENIPUNCTURE: CPT | Performed by: SURGERY

## 2017-06-25 PROCEDURE — 25000132 ZZH RX MED GY IP 250 OP 250 PS 637: Performed by: THORACIC SURGERY (CARDIOTHORACIC VASCULAR SURGERY)

## 2017-06-25 PROCEDURE — 25000132 ZZH RX MED GY IP 250 OP 250 PS 637: Performed by: PHYSICIAN ASSISTANT

## 2017-06-25 PROCEDURE — 85730 THROMBOPLASTIN TIME PARTIAL: CPT | Performed by: SURGERY

## 2017-06-25 PROCEDURE — 21400003 ZZH R&B CCU CRITICAL UMMC

## 2017-06-25 PROCEDURE — 25000128 H RX IP 250 OP 636: Performed by: SURGERY

## 2017-06-25 PROCEDURE — 25000125 ZZHC RX 250: Performed by: SURGERY

## 2017-06-25 PROCEDURE — 83735 ASSAY OF MAGNESIUM: CPT | Performed by: SURGERY

## 2017-06-25 PROCEDURE — 97110 THERAPEUTIC EXERCISES: CPT | Mod: GP

## 2017-06-25 PROCEDURE — 40000193 ZZH STATISTIC PT WARD VISIT

## 2017-06-25 PROCEDURE — 84100 ASSAY OF PHOSPHORUS: CPT | Performed by: SURGERY

## 2017-06-25 PROCEDURE — 97530 THERAPEUTIC ACTIVITIES: CPT | Mod: GP

## 2017-06-25 PROCEDURE — 25000132 ZZH RX MED GY IP 250 OP 250 PS 637: Performed by: STUDENT IN AN ORGANIZED HEALTH CARE EDUCATION/TRAINING PROGRAM

## 2017-06-25 PROCEDURE — 00000146 ZZHCL STATISTIC GLUCOSE BY METER IP

## 2017-06-25 PROCEDURE — 99232 SBSQ HOSP IP/OBS MODERATE 35: CPT | Mod: GC | Performed by: INTERNAL MEDICINE

## 2017-06-25 PROCEDURE — 94640 AIRWAY INHALATION TREATMENT: CPT | Mod: 76

## 2017-06-25 RX ORDER — WARFARIN SODIUM 7.5 MG/1
7.5 TABLET ORAL
Status: COMPLETED | OUTPATIENT
Start: 2017-06-25 | End: 2017-06-25

## 2017-06-25 RX ORDER — FUROSEMIDE 10 MG/ML
40 INJECTION INTRAMUSCULAR; INTRAVENOUS ONCE
Status: COMPLETED | OUTPATIENT
Start: 2017-06-25 | End: 2017-06-25

## 2017-06-25 RX ADMIN — IPRATROPIUM BROMIDE AND ALBUTEROL SULFATE 3 ML: .5; 3 SOLUTION RESPIRATORY (INHALATION) at 13:44

## 2017-06-25 RX ADMIN — HYDROMORPHONE HYDROCHLORIDE 4 MG: 2 TABLET ORAL at 23:46

## 2017-06-25 RX ADMIN — METOPROLOL TARTRATE 25 MG: 25 TABLET, FILM COATED ORAL at 09:31

## 2017-06-25 RX ADMIN — IPRATROPIUM BROMIDE AND ALBUTEROL SULFATE 3 ML: .5; 3 SOLUTION RESPIRATORY (INHALATION) at 02:50

## 2017-06-25 RX ADMIN — ACETAMINOPHEN 650 MG: 325 TABLET, FILM COATED ORAL at 19:41

## 2017-06-25 RX ADMIN — TRAZODONE HYDROCHLORIDE 50 MG: 50 TABLET ORAL at 22:00

## 2017-06-25 RX ADMIN — SENNOSIDES AND DOCUSATE SODIUM 2 TABLET: 8.6; 5 TABLET ORAL at 19:42

## 2017-06-25 RX ADMIN — HYDROMORPHONE HYDROCHLORIDE 0.5 MG: 1 INJECTION, SOLUTION INTRAMUSCULAR; INTRAVENOUS; SUBCUTANEOUS at 19:46

## 2017-06-25 RX ADMIN — INSULIN ASPART 1 UNITS: 100 INJECTION, SOLUTION INTRAVENOUS; SUBCUTANEOUS at 17:55

## 2017-06-25 RX ADMIN — MONTELUKAST SODIUM 10 MG: 10 TABLET, FILM COATED ORAL at 22:00

## 2017-06-25 RX ADMIN — Medication 500 MCG: at 13:28

## 2017-06-25 RX ADMIN — HYDROMORPHONE HYDROCHLORIDE 0.5 MG: 1 INJECTION, SOLUTION INTRAMUSCULAR; INTRAVENOUS; SUBCUTANEOUS at 13:18

## 2017-06-25 RX ADMIN — ACETAMINOPHEN 650 MG: 325 TABLET, FILM COATED ORAL at 14:58

## 2017-06-25 RX ADMIN — IPRATROPIUM BROMIDE AND ALBUTEROL SULFATE 3 ML: .5; 3 SOLUTION RESPIRATORY (INHALATION) at 19:46

## 2017-06-25 RX ADMIN — PANTOPRAZOLE SODIUM 40 MG: 40 TABLET, DELAYED RELEASE ORAL at 09:31

## 2017-06-25 RX ADMIN — HYDROMORPHONE HYDROCHLORIDE 4 MG: 2 TABLET ORAL at 14:58

## 2017-06-25 RX ADMIN — SENNOSIDES AND DOCUSATE SODIUM 2 TABLET: 8.6; 5 TABLET ORAL at 09:31

## 2017-06-25 RX ADMIN — METOPROLOL TARTRATE 25 MG: 25 TABLET, FILM COATED ORAL at 19:42

## 2017-06-25 RX ADMIN — HYDROMORPHONE HYDROCHLORIDE 4 MG: 2 TABLET ORAL at 08:03

## 2017-06-25 RX ADMIN — IPRATROPIUM BROMIDE AND ALBUTEROL SULFATE 3 ML: .5; 3 SOLUTION RESPIRATORY (INHALATION) at 09:17

## 2017-06-25 RX ADMIN — CITALOPRAM HYDROBROMIDE 20 MG: 20 TABLET ORAL at 09:31

## 2017-06-25 RX ADMIN — HYDROMORPHONE HYDROCHLORIDE 0.5 MG: 1 INJECTION, SOLUTION INTRAMUSCULAR; INTRAVENOUS; SUBCUTANEOUS at 01:43

## 2017-06-25 RX ADMIN — ACETAMINOPHEN 650 MG: 325 TABLET, FILM COATED ORAL at 08:03

## 2017-06-25 RX ADMIN — ASPIRIN 81 MG CHEWABLE TABLET 81 MG: 81 TABLET CHEWABLE at 09:31

## 2017-06-25 RX ADMIN — HYDROMORPHONE HYDROCHLORIDE 4 MG: 2 TABLET ORAL at 17:55

## 2017-06-25 RX ADMIN — HYDROMORPHONE HYDROCHLORIDE 4 MG: 2 TABLET ORAL at 20:57

## 2017-06-25 RX ADMIN — HYDROMORPHONE HYDROCHLORIDE 4 MG: 2 TABLET ORAL at 04:21

## 2017-06-25 RX ADMIN — FUROSEMIDE 40 MG: 10 INJECTION, SOLUTION INTRAVENOUS at 13:31

## 2017-06-25 RX ADMIN — POLYETHYLENE GLYCOL 3350 17 G: 17 POWDER, FOR SOLUTION ORAL at 09:32

## 2017-06-25 RX ADMIN — WARFARIN SODIUM 7.5 MG: 7.5 TABLET ORAL at 17:55

## 2017-06-25 NOTE — PLAN OF CARE
Problem: Ventricular Assist Device (Adult)  Goal: Signs and Symptoms of Listed Potential Problems Will be Absent or Manageable (Ventricular Assist Device)  Signs and symptoms of listed potential problems will be absent or manageable by discharge/transition of care (reference Ventricular Assist Device (Adult) CPG).   Outcome: No Change  D/I: Monitor shows A paced, PVC with bigem/trigem. No episodes a-fib noted. 7 episodes VT 5-11 beats within 10 minutes. No VAD value changes. Asymptomatic. Returned to bed without subsequent ectopy. VAD values within patient norms. Providers notified. To continue on PO metoprolol. INR 2.41. Heparin drip dc'd and will get 7.5 mg warfarin this evening. Continues with 2 pleural CTs to suction with no output this shift. In bed since this AM. C/O pain at CT/incision sites. Slightly relieved with PO hydromorphone and encouraged to call 15 minutes prior to 3 hour dose due. Called at 1330 with severe pain and relieved 0.5 mg IV hydromorphone with good relief. 4 mg PO hydromorphone given at 1500 to extend pain relief. Poor PO intake. Ate 50% on breakfast and lunch, only 1 ensure this shift. No BM, received PO and to get supp if no BM this afternoon. Friend here to visit over lunch time. Received 40 mg PO lasix x1 this afternoon. See flowsheets for assessments and additional data.  A: Continued pain slightly relieved with PO pain meds. Normal VAD values, despite VT.  P: Assess and treat pain as indicated. Transitioning to PO pain medications. Monitor rhythm and rate and medicate as ordered. Notify providers with VT. Continue VAD education. Encourage PO intake and increased activity. Continue current cares and notify providers with questions or concerns.    06/25/17 1530   Ventricular Assist Device   Problems Assessed (Ventricular Assist Device) all   Problems Present (Ventricular Assist Device) situational response

## 2017-06-25 NOTE — PROGRESS NOTES
CVTS Daily Note  6/23/2017  Attending: Ronnie Quigley MD    S:   No overnight events. Pain controlled. Runs of VT 5-11 beats intermittently when in chair. HDS, asymptomatic. Cards evaluating.    O:   Vitals:    06/24/17 1919 06/24/17 2306 06/25/17 0500 06/25/17 0800   BP: 94/80 93/77  93/80   BP Location: Left arm Right arm  Right arm   Pulse:       Resp: 18 18 18 20   Temp: 98.4  F (36.9  C) 98.4  F (36.9  C)  98.4  F (36.9  C)   TempSrc: Oral Oral  Oral   SpO2: 100% 100% 100%    Weight:  115.2 kg (253 lb 14.4 oz)     Height:         Vitals:    06/22/17 0600 06/23/17 0501 06/24/17 2306   Weight: 110.5 kg (243 lb 9.7 oz) 114 kg (251 lb 4.8 oz) 115.2 kg (253 lb 14.4 oz)     + 0 kg since admit    Intake/Output Summary (Last 24 hours) at 06/23/17 0939  Last data filed at 06/23/17 0600   Gross per 24 hour   Intake              755 ml   Output              784 ml   Net              -29 ml       MAPs: 70 - 82  Gen: AAO x 3, pleasant, NAD  CV: LVAD humming, no murmurs, rubs, or gallops.   Pulm: CTA, no rhonchi or wheezes  Abd: soft, non-tender, no guarding  Ext: trace peripheral edema, non-pitting  Incision: overall clean, dry, intact, no erythema. Inferior 2 cm sternal incision moist with only superficial dehiscence   Chest Tube sites: dressings clean and dry, serosanguinous, no air leak, output 60 cc / 50 cc   * removed mediastinal tubes without immediate complication.   Lines: driveline dressing clean and dry, marked 6/21 (2 days old)  LVAD: speed 9000, flow 5, PI 6.3, power 5.2.       Labs:  [facility]    Recent Labs  Lab 06/25/17  0826 06/24/17  0736 06/23/17  0626 06/22/17  0418    135 137 138   POTASSIUM 4.1 4.2 4.0 4.2   CHLORIDE 104 104 104 105   QAMAR 8.6 8.6 8.6 9.1   CO2 24 24 25 23   BUN 34* 36* 37* 34*   CR 1.62* 1.90* 2.12* 1.93*   * 113* 135* 121*     CBC    Recent Labs  Lab 06/25/17  0826 06/24/17  0736 06/23/17  1002 06/22/17  0418   WBC 9.6 8.4 8.8 13.1*   RBC 2.83* 2.84* 2.83* 3.04*   HGB 8.1* 8.3*  8.4* 8.9*   HCT 25.8* 25.9* 26.0* 28.1*   MCV 91 91 92 92   MCH 28.6 29.2 29.7 29.3   MCHC 31.4* 32.0 32.3 31.7   RDW 13.8 13.8 14.2 14.4    154 119* 113*     INR    Recent Labs  Lab 06/25/17  0826 06/24/17  1021 06/24/17  0736 06/23/17  0626   INR 2.41* 2.03* Unsatisfactory specimen - possible contaminationANITA RN ON 6C AT 0833 06/24/17CORRECTED ON 06/24 AT 0835: PREVIOUSLY REPORTED AS 1.99 1.53*      Hepatic Panel   Lab Results   Component Value Date    AST 23 06/15/2017     Lab Results   Component Value Date    ALT 26 06/15/2017     Lab Results   Component Value Date    ALBUMIN 3.8 06/15/2017     GLUCOSE:     Recent Labs  Lab 06/25/17  0826 06/25/17  0806 06/24/17  2121 06/24/17  1720 06/24/17  1500 06/24/17  1001 06/24/17  0736 06/23/17  2106  06/23/17  0626  06/22/17  0418  06/21/17  1921  06/21/17  0359   *  --   --   --   --   --  113*  --   --  135*  --  121*  --  116*  --  114*   BGM  --  125* 131* 159* 205* 132*  --  149*  < >  --   < >  --   < >  --   < >  --    < > = values in this interval not displayed.      Imaging:  reviewed recent imaging      A/P:   Jim Willingham is a 62 year old male with NICM (EF 20%) s/p Bi-V ICD, severe MR, VT, paroxsymal afib, DM, HTN, CKD, CECILIA, who is s/p HeartMate II left ventricular assist device placement 6/19 with Dr Quigley. IABP removed POD #1.     Neuro:   - Evaristo APAP, prn dilaudidq 3, prn IV dilaudid, prn flexeril   - PTA meds; Celexa, Trazodone    CV:   - HM II lvad as DT, stage D NYHA Class IIIB  - ASA 81 mg  - Nonsustained SVT/A-fib. Replace Lytes PRN. Metoprolol 25 mg BID per Cards II.     Pulm:   - Extubated POD #2.   - IS, deep breathe, Nebs q 6 hrs    ID:   - Completed jason-op ABx. Afebrile. WBC wnl.     GI / FEN:    - Hx gastric bypass, B12 supplement.   - Reg diet, bowel regimen.   - Recommended to patient to remain soft/liquid diet until + flatus.      Renal / :   - CKD stage III. Creat 2.12.   - lasix 40 once    Heme:   - Hgb 8's. Plts 119  -  INR 2.41    Endo:   - Sliding scale insulin   - Holding metformin and allopurinol for elevated Creatinine    PPX:   - Protonix    Anticoagulation:   - Coumadin for LVAD, goal 2-3.  INR pending    Dispo:   - 6C    Seen and discussed with Dr. Monet.    Myke Sanchez MD PGY-3  Surgery Resident    Patient seen and examined. Continue anticoagulation with heparin and coumadin. Continue current management plan. Agree with the findings in the Resident's note. I spent a total of 25 minutes examining the patient, reviewing investigations and therapeutic counseling.

## 2017-06-25 NOTE — PROGRESS NOTES
Cardiology Progress Note     Events and interval changes in past 24 hours:   Has been ambulating without dyspnea. No orthoponea, dyspnea, presyncoe  Pain at chest incision site mild limitation of breathing  Has had NSVT longest around 15 beats in a row.     ASSESSMENT:    62M h/o NICM (EF 20%) s/p Bi-V ICD, severe MR, VT, paroxsymal afib, DM, HTN, CKD, CECILIA, MDD, admitted for gradual shortness of breath, likely 2/2 to inadequate maintenance diuretics. Improving with BiPAP.     # NICM s/p CRT-D s/p HM2 LVAD as DT , s/p [id number]  in May, extubated   # Mild RV function after LVAD, normal RV function after LVAD  # Severe functional MR    # WALTER-unclear etiology-likely hypoperfusion from surgery    # Gout-left elbow    # hx of VT  # pAF  #HTN  #CKD-basline Cr 1.4  #CECILIA    RECS:    - Monitor Cr, continues to improve, looks euvolemic, no indication for diuresis.  - SVT: metoprolol 12.5 BID increased to 25 mg PO BID. Can go up on the dose if he gets multiple episodes of NSVT. No indication for amio currently.   - Goal MAP<90.   - aspirin 81, heparin/warfarin  - optimize LVAD speed: increased to 9000 on     Staffed with Dr. Ramiro Lilly MD    Cardiology  OBJECTIVE FINDINGS:  Temp: 98.4  F (36.9  C) Temp  Min: 98.1  F (36.7  C)  Max: 98.4  F (36.9  C)  Resp: 18 Resp  Min: 18  Max: 20  SpO2: 100 % SpO2  Min: 100 %  Max: 100 % ra Bipap at night    No Data Recorded  Heart Rate: 80 Heart Rate  Min: 62  Max: 83  BP: 93/77 Systolic (24hrs), Av , Min:81 , Max:94     MAPs 70-90s    VAD: No alarms, no events, this AM numbers        Gen: alert  Resp: clear to auscultation bilaterally, no crackles or wheezing   CV: VAD hum, JVP 7cm  Abd:soft, nontender,         Vitals:    17 0515 17 0415 17 0600 17 0501   Weight: 112.8 kg (248 lb 10.9 oz) 114.5 kg (252 lb 6.8 oz) 110.5 kg (243 lb 9.7 oz) 114 kg (251 lb 4.8 oz)    17 230   Weight: 115.2 kg (253 lb 14.4 oz)      Inhaled NO    Warfarin Therapy Reminder       HEParin 1,300 Units/hr (06/24/17 1700)     Reason beta blocker order not selected       dextrose 5% and 0.45% NaCl 30 mL/hr at 06/21/17 0800       metoprolol  25 mg Oral BID     polyethylene glycol  17 g Oral BID     bisacodyl  10 mg Rectal Daily     acetaminophen  650 mg Oral Q6H     citalopram  20 mg Oral Daily     cyanocobalamin  500 mcg Sublingual Q48H     montelukast  10 mg Oral At Bedtime     fluticasone-vilanterol  1 puff Inhalation Daily     pantoprazole  40 mg Oral QAM     insulin aspart  1-7 Units Subcutaneous TID AC     insulin aspart  1-5 Units Subcutaneous At Bedtime     ipratropium - albuterol 0.5 mg/2.5 mg/3 mL  3 mL Nebulization Q6H     aspirin  81 mg Oral or Feeding Tube Daily     traZODone  50 mg Oral At Bedtime     sodium chloride (PF)  3 mL Intracatheter Q8H     senna-docusate  1-2 tablet Oral BID     pneumococcal vaccine  0.5 mL Intramuscular Prior to discharge   cyclobenzaprine, HYDROmorphone, albuterol, glucose **OR** dextrose **OR** glucagon, HYDROmorphone, Warfarin Therapy Reminder, heparin, naloxone, lidocaine, lidocaine 4%, sodium chloride (PF), Reason beta blocker order not selected, potassium chloride, potassium chloride, potassium chloride, potassium chloride with lidocaine, potassium chloride, magnesium sulfate, magnesium sulfate, potassium phosphate (KPHOS) in D5W IV, potassium phosphate (KPHOS) in D5W IV, potassium phosphate (KPHOS) in D5W IV, potassium phosphate (KPHOS) in D5W IV, potassium phosphate (KPHOS) in D5W IV, ondansetron **OR** ondansetron    Albumin 3.5  CMP  Recent Labs  Lab 06/24/17  0736 06/23/17  0626 06/22/17  0418 06/21/17  1921  06/21/17  0359    137 138 138  --  141   POTASSIUM 4.2 4.0 4.2 4.0  < > 4.2   CHLORIDE 104 104 105 108  --  108   CO2 24 25 23 21  --  23   ANIONGAP 7 9 9 9  --  10   * 135* 121* 116*  --  114*   BUN 36* 37* 34* 29  --  28   CR 1.90* 2.12* 1.93* 1.63*  --  1.48*   GFRESTIMATED  36* 32* 36* 43*  --  48*   GFRESTBLACK 44* 39* 43* 52*  --  59*   QAMAR 8.6 8.6 9.1 8.3*  --  8.8   MAG 2.4* 2.0 2.1  --   --  2.4*   PHOS 2.9 3.4 4.3  --   --  4.0   < > = values in this interval not displayed.  CBC    Recent Labs  Lab 06/24/17  0736 06/23/17  1002 06/22/17  0418 06/21/17  1559 06/21/17  0359   WBC 8.4 8.8 13.1*  --  11.8*   RBC 2.84* 2.83* 3.04*  --  2.99*   HGB 8.3* 8.4* 8.9* 9.5* 8.8*   HCT 25.9* 26.0* 28.1*  --  27.5*   MCV 91 92 92  --  92   MCH 29.2 29.7 29.3  --  29.4   MCHC 32.0 32.3 31.7  --  32.0   RDW 13.8 14.2 14.4  --  14.4    119* 113* 104* 115*     INR    Recent Labs  Lab 06/24/17  1021 06/24/17  0736 06/23/17  0626 06/22/17  0418   INR 2.03* Unsatisfactory specimen - possible contaminationANITA RN ON 6C AT 0833 06/24/17CORRECTED ON 06/24 AT 0835: PREVIOUSLY REPORTED AS 1.99 1.53* 1.44*     Arterial Blood Gas    Recent Labs  Lab 06/23/17  0725 06/22/17  0415 06/21/17  1559 06/21/17  0900 06/21/17  0657   PH  --  7.35 7.34* 7.39 7.37   PCO2  --  43 43 33* 37   PO2  --  84 114* 124* 137*   HCO3  --  24 23 20* 22   O2PER 3L 3L  3L 4L 4L 40     Imaging and other studies:  EKG: Vpaced rhythm    Echocardiogram: before LVAD 6/12  Severe left ventricular dilation is present. LVIDd 8.0 cm.  Severe diffuse hypokinesis is present. Posterior wall akinesis is present.  The Ejection Fraction is estimated at 20-25%.Traced Biplane LVEF is 23%.  Moderate-Severe functional Mitral Regurgitation. There is tenting of the mitral leaflets with restriction of the posterior mitral leaflet. ERO is 0.3  cm^2. RV 43 ml.  Mild right ventricular dilation is present. Global right ventricular function is normal.  Dilation of the inferior vena cava is present with normal respiratory variation in diameter.    Echo after LVAD 6/23  Interpretation Summary  HM 2 at 9000 RPM.     LVAD cannula was seen in the expected anatomic position in the LV apex. LV end  diastolic diameter is 5.4cm. The Ejection Fraction is  estimated at <20%.  The aortic valve opens intermittently.  Septum normal.  The inferior vena cava was normal in size with preserved respiratory  Variability.  Mild RV dysfunction      Device settings DDDR  VT/VF ATP and defib, 188  monitor    Attending Attestation:  Patient seen and examined by me with the team. I have performed all pertinent elements of the physical examination and reviewed the note above. I have reviewed pertinent laboratory, echocardiographic, imaging, and cardiac catheterization results. I agree with the plan of care as described in this note.    Alberto Rubio MD, PhD

## 2017-06-25 NOTE — PLAN OF CARE
Problem: Goal Outcome Summary  Goal: Goal Outcome Summary  6C - Pt completed 10 sit <> stands while maintaining sternal precautions with increased time due to fatigue, VSS on 3L O2. Transfer to chair without AD and CGA. Recommend TCU due to deficits with gait, strength, and decreased activity tolerance.

## 2017-06-25 NOTE — PLAN OF CARE
Problem: Goal Outcome Summary  Goal: Goal Outcome Summary  Outcome: No Change   S/p LVAD HM II 6/19, A&Ox4. VSS on 3L/Cpap overnight. V-paced 90s. LVAD #s wnl, no alarms noted. C/o incisional pain partially relieved by PRN tylenol, and IV and OP dilaudid. Heparin gtt therapeutic and infusing at 1300 units/hr.  CT x2 to, drsgs CDI, minimal serosanguinous output. Adequate uop. No BM since 6/18. Up with 2-assist. Appeared to sleep comfortably overnight. No other issues noted overnight. Will continue to monitor and address as needed.

## 2017-06-25 NOTE — PLAN OF CARE
Problem: Goal Outcome Summary  Goal: Goal Outcome Summary  OT 6C: Cancel - patient having cardiac arhythmia. Will reschedule and cont OT POC

## 2017-06-26 ENCOUNTER — APPOINTMENT (OUTPATIENT)
Dept: PHYSICAL THERAPY | Facility: CLINIC | Age: 62
DRG: 001 | End: 2017-06-26
Attending: PHYSICIAN ASSISTANT
Payer: COMMERCIAL

## 2017-06-26 ENCOUNTER — APPOINTMENT (OUTPATIENT)
Dept: GENERAL RADIOLOGY | Facility: CLINIC | Age: 62
DRG: 001 | End: 2017-06-26
Attending: PHYSICIAN ASSISTANT
Payer: COMMERCIAL

## 2017-06-26 ENCOUNTER — APPOINTMENT (OUTPATIENT)
Dept: OCCUPATIONAL THERAPY | Facility: CLINIC | Age: 62
DRG: 001 | End: 2017-06-26
Attending: PHYSICIAN ASSISTANT
Payer: COMMERCIAL

## 2017-06-26 ENCOUNTER — ANTICOAGULATION THERAPY VISIT (OUTPATIENT)
Dept: ANTICOAGULATION | Facility: CLINIC | Age: 62
End: 2017-06-26

## 2017-06-26 DIAGNOSIS — Z95.811 LVAD (LEFT VENTRICULAR ASSIST DEVICE) PRESENT (H): ICD-10-CM

## 2017-06-26 DIAGNOSIS — Z79.01 LONG-TERM (CURRENT) USE OF ANTICOAGULANTS: ICD-10-CM

## 2017-06-26 LAB
ANION GAP SERPL CALCULATED.3IONS-SCNC: 8 MMOL/L (ref 3–14)
APTT PPP: 81 SEC (ref 22–37)
BUN SERPL-MCNC: 28 MG/DL (ref 7–30)
CALCIUM SERPL-MCNC: 9 MG/DL (ref 8.5–10.1)
CHLORIDE SERPL-SCNC: 103 MMOL/L (ref 94–109)
CO2 SERPL-SCNC: 24 MMOL/L (ref 20–32)
CREAT SERPL-MCNC: 1.47 MG/DL (ref 0.66–1.25)
ERYTHROCYTE [DISTWIDTH] IN BLOOD BY AUTOMATED COUNT: 14 % (ref 10–15)
GFR SERPL CREATININE-BSD FRML MDRD: 49 ML/MIN/1.7M2
GLUCOSE BLDC GLUCOMTR-MCNC: 125 MG/DL (ref 70–99)
GLUCOSE BLDC GLUCOMTR-MCNC: 139 MG/DL (ref 70–99)
GLUCOSE BLDC GLUCOMTR-MCNC: 151 MG/DL (ref 70–99)
GLUCOSE BLDC GLUCOMTR-MCNC: 175 MG/DL (ref 70–99)
GLUCOSE SERPL-MCNC: 118 MG/DL (ref 70–99)
HCT VFR BLD AUTO: 26.2 % (ref 40–53)
HGB BLD-MCNC: 8.3 G/DL (ref 13.3–17.7)
INR PPP: 2.83 (ref 0.86–1.14)
INTERPRETATION ECG - MUSE: NORMAL
INTERPRETATION ECG - MUSE: NORMAL
MAGNESIUM SERPL-MCNC: 2.1 MG/DL (ref 1.6–2.3)
MCH RBC QN AUTO: 28.6 PG (ref 26.5–33)
MCHC RBC AUTO-ENTMCNC: 31.7 G/DL (ref 31.5–36.5)
MCV RBC AUTO: 90 FL (ref 78–100)
PHOSPHATE SERPL-MCNC: 3.2 MG/DL (ref 2.5–4.5)
PLATELET # BLD AUTO: 218 10E9/L (ref 150–450)
POTASSIUM SERPL-SCNC: 4 MMOL/L (ref 3.4–5.3)
RBC # BLD AUTO: 2.9 10E12/L (ref 4.4–5.9)
SODIUM SERPL-SCNC: 135 MMOL/L (ref 133–144)
WBC # BLD AUTO: 9.4 10E9/L (ref 4–11)

## 2017-06-26 PROCEDURE — 71020 XR CHEST 2 VW: CPT

## 2017-06-26 PROCEDURE — 97535 SELF CARE MNGMENT TRAINING: CPT | Mod: GO | Performed by: OCCUPATIONAL THERAPIST

## 2017-06-26 PROCEDURE — 25000128 H RX IP 250 OP 636: Performed by: PHYSICIAN ASSISTANT

## 2017-06-26 PROCEDURE — 40000133 ZZH STATISTIC OT WARD VISIT: Performed by: OCCUPATIONAL THERAPIST

## 2017-06-26 PROCEDURE — 97530 THERAPEUTIC ACTIVITIES: CPT | Mod: GP | Performed by: PHYSICAL THERAPIST

## 2017-06-26 PROCEDURE — 21400003 ZZH R&B CCU CRITICAL UMMC

## 2017-06-26 PROCEDURE — 94640 AIRWAY INHALATION TREATMENT: CPT

## 2017-06-26 PROCEDURE — 25000125 ZZHC RX 250: Performed by: SURGERY

## 2017-06-26 PROCEDURE — 25000132 ZZH RX MED GY IP 250 OP 250 PS 637: Performed by: SURGERY

## 2017-06-26 PROCEDURE — 83735 ASSAY OF MAGNESIUM: CPT | Performed by: SURGERY

## 2017-06-26 PROCEDURE — 97116 GAIT TRAINING THERAPY: CPT | Mod: GP | Performed by: PHYSICAL THERAPIST

## 2017-06-26 PROCEDURE — 85730 THROMBOPLASTIN TIME PARTIAL: CPT | Performed by: SURGERY

## 2017-06-26 PROCEDURE — 84100 ASSAY OF PHOSPHORUS: CPT | Performed by: SURGERY

## 2017-06-26 PROCEDURE — 00000146 ZZHCL STATISTIC GLUCOSE BY METER IP

## 2017-06-26 PROCEDURE — 25000132 ZZH RX MED GY IP 250 OP 250 PS 637: Performed by: THORACIC SURGERY (CARDIOTHORACIC VASCULAR SURGERY)

## 2017-06-26 PROCEDURE — 85610 PROTHROMBIN TIME: CPT | Performed by: SURGERY

## 2017-06-26 PROCEDURE — 94640 AIRWAY INHALATION TREATMENT: CPT | Mod: 76

## 2017-06-26 PROCEDURE — 80048 BASIC METABOLIC PNL TOTAL CA: CPT | Performed by: SURGERY

## 2017-06-26 PROCEDURE — 40000275 ZZH STATISTIC RCP TIME EA 10 MIN

## 2017-06-26 PROCEDURE — 25000132 ZZH RX MED GY IP 250 OP 250 PS 637: Performed by: PHYSICIAN ASSISTANT

## 2017-06-26 PROCEDURE — 85027 COMPLETE CBC AUTOMATED: CPT | Performed by: SURGERY

## 2017-06-26 PROCEDURE — 36415 COLL VENOUS BLD VENIPUNCTURE: CPT | Performed by: SURGERY

## 2017-06-26 PROCEDURE — 25000132 ZZH RX MED GY IP 250 OP 250 PS 637: Performed by: STUDENT IN AN ORGANIZED HEALTH CARE EDUCATION/TRAINING PROGRAM

## 2017-06-26 PROCEDURE — 40000193 ZZH STATISTIC PT WARD VISIT: Performed by: PHYSICAL THERAPIST

## 2017-06-26 PROCEDURE — 99232 SBSQ HOSP IP/OBS MODERATE 35: CPT | Mod: GC | Performed by: INTERNAL MEDICINE

## 2017-06-26 RX ORDER — HYDROMORPHONE HCL/0.9% NACL/PF 0.2MG/0.2
0.2 SYRINGE (ML) INTRAVENOUS EVERY 6 HOURS PRN
Status: DISCONTINUED | OUTPATIENT
Start: 2017-06-26 | End: 2017-06-30 | Stop reason: HOSPADM

## 2017-06-26 RX ORDER — WARFARIN SODIUM 5 MG/1
5 TABLET ORAL
Status: COMPLETED | OUTPATIENT
Start: 2017-06-26 | End: 2017-06-26

## 2017-06-26 RX ORDER — TRAMADOL HYDROCHLORIDE 50 MG/1
50 TABLET ORAL EVERY 6 HOURS PRN
Status: DISCONTINUED | OUTPATIENT
Start: 2017-06-26 | End: 2017-06-30 | Stop reason: HOSPADM

## 2017-06-26 RX ORDER — IPRATROPIUM BROMIDE AND ALBUTEROL SULFATE 2.5; .5 MG/3ML; MG/3ML
3 SOLUTION RESPIRATORY (INHALATION)
Status: DISCONTINUED | OUTPATIENT
Start: 2017-06-26 | End: 2017-06-30 | Stop reason: HOSPADM

## 2017-06-26 RX ORDER — IPRATROPIUM BROMIDE AND ALBUTEROL SULFATE 2.5; .5 MG/3ML; MG/3ML
3 SOLUTION RESPIRATORY (INHALATION) EVERY 4 HOURS PRN
Status: DISCONTINUED | OUTPATIENT
Start: 2017-06-26 | End: 2017-06-30 | Stop reason: HOSPADM

## 2017-06-26 RX ORDER — FUROSEMIDE 10 MG/ML
40 INJECTION INTRAMUSCULAR; INTRAVENOUS ONCE
Status: COMPLETED | OUTPATIENT
Start: 2017-06-26 | End: 2017-06-26

## 2017-06-26 RX ADMIN — WARFARIN SODIUM 5 MG: 5 TABLET ORAL at 18:04

## 2017-06-26 RX ADMIN — HYDROMORPHONE HYDROCHLORIDE 0.5 MG: 1 INJECTION, SOLUTION INTRAMUSCULAR; INTRAVENOUS; SUBCUTANEOUS at 14:07

## 2017-06-26 RX ADMIN — ACETAMINOPHEN 650 MG: 325 TABLET, FILM COATED ORAL at 14:41

## 2017-06-26 RX ADMIN — METOPROLOL TARTRATE 25 MG: 25 TABLET, FILM COATED ORAL at 08:00

## 2017-06-26 RX ADMIN — SENNOSIDES AND DOCUSATE SODIUM 2 TABLET: 8.6; 5 TABLET ORAL at 08:00

## 2017-06-26 RX ADMIN — INSULIN ASPART 1 UNITS: 100 INJECTION, SOLUTION INTRAVENOUS; SUBCUTANEOUS at 13:03

## 2017-06-26 RX ADMIN — HYDROMORPHONE HYDROCHLORIDE 0.2 MG: 10 INJECTION, SOLUTION INTRAMUSCULAR; INTRAVENOUS; SUBCUTANEOUS at 16:30

## 2017-06-26 RX ADMIN — PANTOPRAZOLE SODIUM 40 MG: 40 TABLET, DELAYED RELEASE ORAL at 08:00

## 2017-06-26 RX ADMIN — MONTELUKAST SODIUM 10 MG: 10 TABLET, FILM COATED ORAL at 21:44

## 2017-06-26 RX ADMIN — ACETAMINOPHEN 650 MG: 325 TABLET, FILM COATED ORAL at 08:00

## 2017-06-26 RX ADMIN — HYDROMORPHONE HYDROCHLORIDE 4 MG: 2 TABLET ORAL at 06:15

## 2017-06-26 RX ADMIN — POLYETHYLENE GLYCOL 3350 17 G: 17 POWDER, FOR SOLUTION ORAL at 19:56

## 2017-06-26 RX ADMIN — FUROSEMIDE 40 MG: 10 INJECTION, SOLUTION INTRAVENOUS at 16:30

## 2017-06-26 RX ADMIN — HYDROMORPHONE HYDROCHLORIDE 4 MG: 2 TABLET ORAL at 12:59

## 2017-06-26 RX ADMIN — HYDROMORPHONE HYDROCHLORIDE 4 MG: 2 TABLET ORAL at 19:56

## 2017-06-26 RX ADMIN — POTASSIUM CHLORIDE 20 MEQ: 750 TABLET, EXTENDED RELEASE ORAL at 08:00

## 2017-06-26 RX ADMIN — ACETAMINOPHEN 650 MG: 325 TABLET, FILM COATED ORAL at 02:54

## 2017-06-26 RX ADMIN — ASPIRIN 81 MG CHEWABLE TABLET 81 MG: 81 TABLET CHEWABLE at 08:00

## 2017-06-26 RX ADMIN — HYDROMORPHONE HYDROCHLORIDE 4 MG: 2 TABLET ORAL at 02:54

## 2017-06-26 RX ADMIN — IPRATROPIUM BROMIDE AND ALBUTEROL SULFATE 3 ML: .5; 3 SOLUTION RESPIRATORY (INHALATION) at 07:45

## 2017-06-26 RX ADMIN — IPRATROPIUM BROMIDE AND ALBUTEROL SULFATE 3 ML: .5; 3 SOLUTION RESPIRATORY (INHALATION) at 11:21

## 2017-06-26 RX ADMIN — HYDROMORPHONE HYDROCHLORIDE 4 MG: 2 TABLET ORAL at 09:37

## 2017-06-26 RX ADMIN — LACTULOSE 20 G: 20 POWDER, FOR SOLUTION ORAL at 18:01

## 2017-06-26 RX ADMIN — METOPROLOL TARTRATE 25 MG: 25 TABLET, FILM COATED ORAL at 19:56

## 2017-06-26 RX ADMIN — IPRATROPIUM BROMIDE AND ALBUTEROL SULFATE 3 ML: .5; 3 SOLUTION RESPIRATORY (INHALATION) at 17:02

## 2017-06-26 RX ADMIN — ACETAMINOPHEN 650 MG: 325 TABLET, FILM COATED ORAL at 19:56

## 2017-06-26 RX ADMIN — TRAZODONE HYDROCHLORIDE 50 MG: 50 TABLET ORAL at 21:44

## 2017-06-26 RX ADMIN — SENNOSIDES AND DOCUSATE SODIUM 2 TABLET: 8.6; 5 TABLET ORAL at 19:56

## 2017-06-26 RX ADMIN — CITALOPRAM HYDROBROMIDE 20 MG: 20 TABLET ORAL at 08:00

## 2017-06-26 RX ADMIN — HYDROMORPHONE HYDROCHLORIDE 0.2 MG: 10 INJECTION, SOLUTION INTRAMUSCULAR; INTRAVENOUS; SUBCUTANEOUS at 22:29

## 2017-06-26 RX ADMIN — HYDROMORPHONE HYDROCHLORIDE 0.5 MG: 1 INJECTION, SOLUTION INTRAMUSCULAR; INTRAVENOUS; SUBCUTANEOUS at 11:15

## 2017-06-26 RX ADMIN — IPRATROPIUM BROMIDE AND ALBUTEROL SULFATE 3 ML: .5; 3 SOLUTION RESPIRATORY (INHALATION) at 20:50

## 2017-06-26 RX ADMIN — POLYETHYLENE GLYCOL 3350 17 G: 17 POWDER, FOR SOLUTION ORAL at 08:00

## 2017-06-26 ASSESSMENT — PAIN DESCRIPTION - DESCRIPTORS
DESCRIPTORS: ACHING
DESCRIPTORS: ACHING;DISCOMFORT
DESCRIPTORS: ACHING
DESCRIPTORS: ACHING

## 2017-06-26 NOTE — PLAN OF CARE
Problem: Goal Outcome Summary  Goal: Goal Outcome Summary  Outcome: No Change  D/I/A: Patient's VSS. Using 2L oxygen via NC. Paced rhythm. HMII WDL, no alarms, dressing change done per circulating RN, still need system check. Patient reports incisonal and shoulder pain, PO dilaudid given with some relief, IV dilaudid given x2 for breakthrough. Both CT pulled this afternoon. -BM, + flatus, patient declined suppository. Potassium replaced per protocol. Patient had VAD education today in the room. Up with 1-2 assist.     P: Continue to monitor and notify MDs as needed.

## 2017-06-26 NOTE — MR AVS SNAPSHOT
Jim Willingham   6/26/2017   Anticoagulation Therapy Visit    Description:  62 year old male   Provider:  Radha Pedro, RN   Department:  [facility]           INR as of 6/26/2017     Today's INR       Anticoagulation Summary as of 6/26/2017     INR goal 2.0-3.0   Today's INR    Next INR check     Indications   LVAD (left ventricular assist device) present (H) [Z95.811]  Long-term (current) use of anticoagulants [Z79.01] [Z79.01]

## 2017-06-26 NOTE — PROGRESS NOTES
CVTS Daily Note  6/26/2017  Attending: Ronnie Quigley MD    S:   No overnight events- Some VT last evening.   Pt seen at bedside resting comfortably.    Does complain of incisional pain, otherwise no acute complaints.      Denies F/C/Sweats.  No CP, SOB, or calf pain.    Tolerating diet and tube feeds.  No BM and + flatus.      Ambulated well with assistance.    Pain controlled well, but still having R shoulder pain    O:   Vitals:    06/26/17 0000 06/26/17 0300 06/26/17 0600 06/26/17 0739   BP: 95/80   (!) 85/66   BP Location: Right arm   Right arm   Pulse:       Resp: 18 20 18 17   Temp: 98.4  F (36.9  C)   98.2  F (36.8  C)   TempSrc: Oral   Oral   SpO2: 95%   97%   Weight:       Height:         Vitals:    06/22/17 0600 06/23/17 0501 06/24/17 2306   Weight: 110.5 kg (243 lb 9.7 oz) 114 kg (251 lb 4.8 oz) 115.2 kg (253 lb 14.4 oz)     + 1 kg since admit    Intake/Output Summary (Last 24 hours) at 06/26/17 1156  Last data filed at 06/26/17 0700   Gross per 24 hour   Intake              660 ml   Output             1230 ml   Net             -570 ml       MAPs: 70-80's  Gen: AAO x 3, pleasant, NAD  CV: LVAD humming, no murmurs, rubs, or gallops.   Pulm: CTA, no rhonchi or wheezes  Abd: soft, non-tender, no guarding  Ext: trace peripheral edema, non-pitting  Incision: clean, dry, intact, no erythema  Chest Tube sites: dressings clean and dry, serosanguinous, no air leak, output 110 cc    * chest tubes removed without immediate incident   Lines: driveline dressing clean and dry   LVAD: speed 9000, flow 5.2 - 5.3, PI 5.1 - 6.2, power 5.4 - 6.3.       Labs:  [facility]    Recent Labs  Lab 06/26/17  0606 06/25/17  0826 06/24/17  0736 06/23/17  0626    136 135 137   POTASSIUM 4.0 4.1 4.2 4.0   CHLORIDE 103 104 104 104   QAMAR 9.0 8.6 8.6 8.6   CO2 24 24 24 25   BUN 28 34* 36* 37*   CR 1.47* 1.62* 1.90* 2.12*   * 116* 113* 135*     CBC    Recent Labs  Lab 06/26/17  0606 06/25/17  0826 06/24/17  0736 06/23/17  1002   WBC  9.4 9.6 8.4 8.8   RBC 2.90* 2.83* 2.84* 2.83*   HGB 8.3* 8.1* 8.3* 8.4*   HCT 26.2* 25.8* 25.9* 26.0*   MCV 90 91 91 92   MCH 28.6 28.6 29.2 29.7   MCHC 31.7 31.4* 32.0 32.3   RDW 14.0 13.8 13.8 14.2    190 154 119*     INR    Recent Labs  Lab 06/26/17  0606 06/25/17  0826 06/24/17  1021 06/24/17  0736   INR 2.83* 2.41* 2.03* Unsatisfactory specimen - possible contaminationANITA RN ON 6C AT 0833 06/24/17CORRECTED ON 06/24 AT 0835: PREVIOUSLY REPORTED AS 1.99      Hepatic Panel   Lab Results   Component Value Date    AST 23 06/15/2017     Lab Results   Component Value Date    ALT 26 06/15/2017     Lab Results   Component Value Date    ALBUMIN 3.8 06/15/2017     GLUCOSE:     Recent Labs  Lab 06/26/17  0606 06/26/17  0257 06/25/17  2100 06/25/17  1719 06/25/17  0826 06/25/17  0806 06/24/17  2121 06/24/17  1720  06/24/17  0736  06/23/17  0626  06/22/17  0418  06/21/17  1921   *  --   --   --  116*  --   --   --   --  113*  --  135*  --  121*  --  116*   BGM  --  125* 170* 186*  --  125* 131* 159*  < >  --   < >  --   < >  --   < >  --    < > = values in this interval not displayed.      Imaging:    CXR 6/25-   1. Perihilar and bibasilar opacities are unchanged, and likely a combination of pulmonary edema and atelectasis.  2. Interval removal of the mediastinal tube.        A/P:   Jim Willingham is a 62 year old male with NICM (EF 20%) s/p Bi-V ICD, severe MR, VT, paroxsymal afib, DM, HTN, CKD, CECILIA, who is s/p HeartMate II left ventricular assist device placement 6/19 with Dr Quigley. IABP removed POD #1.      Neuro:   - Evaristo APAP, prn dilaudidq 3, prn IV dilaudid, prn flexeril   - PTA meds; Celexa, Trazodone     CV:   - HM II lvad as DT, stage D NYHA Class IIIB  - ASA 81 mg  - Nonsustained SVT/A-fib. Replace Lytes PRN. Metoprolol 25 mg BID per Cards II.      Pulm:   - Extubated POD #2.   - IS, deep breathe, Nebs q 6 hrs  - Pleurals out 6/26, CXR pending.      ID:   - Completed jason-op ABx. Afebrile. WBC wnl.       GI / FEN:    - Hx gastric bypass, B12 supplement.   - Reg diet, bowel regimen.   - Recommended to patient to remain soft/liquid diet until + BM      Renal / :   - CKD stage III. Creat 1.47   - lasix 40 mg IV x 1 dose 6/26     Heme:   - Hgb 8's. Plts > 200  - INR 2.41     Endo:   - Sliding scale insulin   - Holding metformin and allopurinol for elevated Creatinine     PPX:   - Protonix     Anticoagulation:   - Coumadin for LVAD, goal 2-3.  INR therapeutic     Dispo:   - 6C      Staff surgeons have been informed of changes through both  verbal and written communication.      Nash Amado PA-C  Cardiothoracic Surgery  Pager [phone]    11:56 AM   June 26, 2017          Patient seen and examined. Continue anticoagulation with coumadin. Continue current management plan. Agree with the findings in the [facility] Provider's note. I spent a total of 20 minutes examining the patient, reviewing investigations and therapeutic counseling.

## 2017-06-26 NOTE — PROGRESS NOTES
Care Coordinator Progress Note     Admission Date/Time:  6/15/2017  Attending MD:  Ronnie Quigley MD   Data  Chart reviewed, discussed with interdisciplinary team.   Patient was admitted for:    Heart failure (H)  Heart failure, unspecified heart failure chronicity, unspecified heart failure type (H).    Concerns with insurance coverage for discharge needs: TBD for home LVAD dressing supplies. LVAD Coordinator arranging supplies.  Current Living Situation: Patient lives with spouse.  Support System: Supportive and Involved. Pt's wife will be doing pt's LVAD dressing changes at home.   Services Involved: DME. [facility] for outpt INR and Warfarin monitoring being changed to the [facility].  Transportation: Family or Friend will provide  Barriers to Discharge: post-op recovery  Coordination of Care and Referrals: Provided patient/family with options for outpt INR and Warfarin monitoring.    Assessment  Per MD, pt's outpt INR and Warfarin monitoring needs to be changed to the [facility]; pt is in agreement with this plan.   Intervention:      Arrangements made with the [facility] (Ph: [phone] Fax: [phone]) for INR and Warfarin monitoring (Goal=2-3). Indication for Anticoagulation: LVAD. Dr. Delisa Montgomery to follow.    Plan  Anticipated Discharge Date:  TBD  Anticipated Discharge Plan:  Physical Therapy is currently recommending TCU.   CC will continue to monitor pt's medical condition and progress toward discharge.   OMAR VILLALTA RN BSN  Care Coordinator  [phone]

## 2017-06-26 NOTE — PLAN OF CARE
Problem: Goal Outcome Summary  Goal: Goal Outcome Summary  OT/6C: Pt completed seated EOB ADLs with setup. Pt requiring rest breaks due to fatigue. Pt limited by fatigue and post op precautions.     REC: TCU due to decreased ADL I, strength, mobility

## 2017-06-26 NOTE — PLAN OF CARE
Problem: Goal Outcome Summary  Goal: Goal Outcome Summary  Outcome: No Change  Pt c/o sternal incision pain. Dilaudid PO given and effective in relieving pain. Sternal incision open to air and healing well. Pt up with assist x2 to chair and tolerated activity well. VSS and LVAD #'s WNL. Continue to encourage increase in activity level. Notify MD of any changes/concerns.

## 2017-06-26 NOTE — PROGRESS NOTES
Cardiology Progress Note     Events and interval changes in past 24 hours:   Got lasix 40IV once yesterday  Denies dyspnea, orthoponea, palpitations, presyncope or chest pain    OBJECTIVE FINDINGS:  Temp: 98.4  F (36.9  C) Temp  Min: 97.5  F (36.4  C)  Max: 98.4  F (36.9  C)  Resp: 18 Resp  Min: 17  Max: 20  SpO2: 95 % SpO2  Min: 88 %  Max: 99 % 2.5L Bipap at night    No Data Recorded  Heart Rate: 87 Heart Rate  Min: 70  Max: 91  BP: 95/80 Systolic (24hrs), Av , Min:91 , Max:95     MAPs 85    HM2 VAD flow 5 PI 6.2 speed 9000 power 5    Gen: alert  Resp: clear to auscultation bilaterally, no crackles or wheezing   CV: VAD hum, JVP 7cm (mid neck?)  Abd:soft, nontender,     1.2/0.8 UO yest  pleural 110 net +0.3  today 0.1/0.2 urine   pleural 0 net -0.2  -0.5 net for admission    Wt after  lbs    Vitals:    17 0515 17 0415 17 0600 17 0501   Weight: 112.8 kg (248 lb 10.9 oz) 114.5 kg (252 lb 6.8 oz) 110.5 kg (243 lb 9.7 oz) 114 kg (251 lb 4.8 oz)    17 2306   Weight: 115.2 kg (253 lb 14.4 oz)     Inhaled NO    Warfarin Therapy Reminder       Reason beta blocker order not selected       dextrose 5% and 0.45% NaCl 30 mL/hr at 17 0800       metoprolol  25 mg Oral BID     polyethylene glycol  17 g Oral BID     bisacodyl  10 mg Rectal Daily     acetaminophen  650 mg Oral Q6H     citalopram  20 mg Oral Daily     cyanocobalamin  500 mcg Sublingual Q48H     montelukast  10 mg Oral At Bedtime     fluticasone-vilanterol  1 puff Inhalation Daily     pantoprazole  40 mg Oral QAM     insulin aspart  1-7 Units Subcutaneous TID AC     insulin aspart  1-5 Units Subcutaneous At Bedtime     ipratropium - albuterol 0.5 mg/2.5 mg/3 mL  3 mL Nebulization Q6H     aspirin  81 mg Oral or Feeding Tube Daily     traZODone  50 mg Oral At Bedtime     sodium chloride (PF)  3 mL Intracatheter Q8H     senna-docusate  1-2 tablet Oral BID     pneumococcal vaccine  0.5 mL Intramuscular Prior to discharge    cyclobenzaprine, HYDROmorphone, albuterol, glucose **OR** dextrose **OR** glucagon, HYDROmorphone, Warfarin Therapy Reminder, naloxone, lidocaine, lidocaine 4%, sodium chloride (PF), Reason beta blocker order not selected, potassium chloride, potassium chloride, potassium chloride, potassium chloride with lidocaine, potassium chloride, magnesium sulfate, magnesium sulfate, potassium phosphate (KPHOS) in D5W IV, potassium phosphate (KPHOS) in D5W IV, potassium phosphate (KPHOS) in D5W IV, potassium phosphate (KPHOS) in D5W IV, potassium phosphate (KPHOS) in D5W IV, ondansetron **OR** ondansetron    Albumin 3.5  CMP    Recent Labs  Lab 06/26/17  0606 06/25/17  0826 06/24/17  0736 06/23/17  0626    136 135 137   POTASSIUM 4.0 4.1 4.2 4.0   CHLORIDE 103 104 104 104   CO2 24 24 24 25   ANIONGAP 8 8 7 9   * 116* 113* 135*   BUN 28 34* 36* 37*   CR 1.47* 1.62* 1.90* 2.12*   GFRESTIMATED 49* 44* 36* 32*   GFRESTBLACK 59* 53* 44* 39*   QAMAR 9.0 8.6 8.6 8.6   MAG 2.1 2.3 2.4* 2.0   PHOS 3.2 2.9 2.9 3.4     CBC    Recent Labs  Lab 06/26/17  0606 06/25/17  0826 06/24/17  0736 06/23/17  1002   WBC 9.4 9.6 8.4 8.8   RBC 2.90* 2.83* 2.84* 2.83*   HGB 8.3* 8.1* 8.3* 8.4*   HCT 26.2* 25.8* 25.9* 26.0*   MCV 90 91 91 92   MCH 28.6 28.6 29.2 29.7   MCHC 31.7 31.4* 32.0 32.3   RDW 14.0 13.8 13.8 14.2    190 154 119*     INR    Recent Labs  Lab 06/26/17  0606 06/25/17  0826 06/24/17  1021 06/24/17  0736   INR 2.83* 2.41* 2.03* Unsatisfactory specimen - possible contaminationANITA RN ON 6C AT 0833 06/24/17CORRECTED ON 06/24 AT 0835: PREVIOUSLY REPORTED AS 1.99     Arterial Blood Gas    Recent Labs  Lab 06/23/17  0725 06/22/17  0415 06/21/17  1559 06/21/17  0900 06/21/17  0657   PH  --  7.35 7.34* 7.39 7.37   PCO2  --  43 43 33* 37   PO2  --  84 114* 124* 137*   HCO3  --  24 23 20* 22   O2PER 3L 3L  3L 4L 4L 40     Imaging and other studies:  EKG: Vpaced rhythm    Echocardiogram: before LVAD 6/12  Severe left  ventricular dilation is present. LVIDd 8.0 cm.  Severe diffuse hypokinesis is present. Posterior wall akinesis is present.  The Ejection Fraction is estimated at 20-25%.Traced Biplane LVEF is 23%.  Moderate-Severe functional Mitral Regurgitation. There is tenting of the mitral leaflets with restriction of the posterior mitral leaflet. ERO is 0.3  cm^2. RV 43 ml.  Mild right ventricular dilation is present. Global right ventricular function is normal.  Dilation of the inferior vena cava is present with normal respiratory variation in diameter.    Echo after LVAD 6/23  Interpretation Summary  HM 2 at 9000 RPM.     LVAD cannula was seen in the expected anatomic position in the LV apex. LV end  diastolic diameter is 5.4cm. The Ejection Fraction is estimated at <20%.  The aortic valve opens intermittently.  Septum normal.  The inferior vena cava was normal in size with preserved respiratory  Variability.  Mild RV dysfunction      Device settings DDDR  VT/VF ATP and defib, 188  monitor    ASSESSMENT:    62M h/o NICM (EF 20%) s/p Bi-V ICD, severe MR, VT, paroxsymal afib, DM, HTN, CKD, CECILIA, MDD, admitted for gradual shortness of breath, likely 2/2 to inadequate maintenance diuretics. Improving with BiPAP.     # NICM s/p CRT-D s/p HM2 LVAD as DT 6/20, s/p [id number]  in May, extubated 6/21  # Mild RV function after LVAD, normal RV function after LVAD  # Severe functional MR-resolved after VAD  # SVT 6/23-6/24  # NSVT 6/25    # WALTER-unclear etiology-likely hypoperfusion from surgery-improving    # Gout-left elbow    # hx of VT  # pAF  #HTN  #CKD-basline Cr 1.4  #CECILIA    Recommendations  - Diuresis  - SVT: metoprolol increased to 25 BID for NSVT  - Goal MAP<90.   - aspirin 81, warfarin INR therapuetic  -optimize LVAD speed: increased to 9000 on 6/20    Will staff with Dr. Ulises Gómez  General cards fellow, PGY4  Pager [phone]

## 2017-06-27 ENCOUNTER — APPOINTMENT (OUTPATIENT)
Dept: PHYSICAL THERAPY | Facility: CLINIC | Age: 62
DRG: 001 | End: 2017-06-27
Attending: PHYSICIAN ASSISTANT
Payer: COMMERCIAL

## 2017-06-27 LAB
ANION GAP SERPL CALCULATED.3IONS-SCNC: 8 MMOL/L (ref 3–14)
APTT PPP: 91 SEC (ref 22–37)
BUN SERPL-MCNC: 28 MG/DL (ref 7–30)
CALCIUM SERPL-MCNC: 8.9 MG/DL (ref 8.5–10.1)
CHLORIDE SERPL-SCNC: 101 MMOL/L (ref 94–109)
CO2 SERPL-SCNC: 25 MMOL/L (ref 20–32)
CREAT SERPL-MCNC: 1.39 MG/DL (ref 0.66–1.25)
ERYTHROCYTE [DISTWIDTH] IN BLOOD BY AUTOMATED COUNT: 14.3 % (ref 10–15)
GFR SERPL CREATININE-BSD FRML MDRD: 52 ML/MIN/1.7M2
GLUCOSE BLDC GLUCOMTR-MCNC: 123 MG/DL (ref 70–99)
GLUCOSE BLDC GLUCOMTR-MCNC: 123 MG/DL (ref 70–99)
GLUCOSE BLDC GLUCOMTR-MCNC: 125 MG/DL (ref 70–99)
GLUCOSE BLDC GLUCOMTR-MCNC: 144 MG/DL (ref 70–99)
GLUCOSE BLDC GLUCOMTR-MCNC: 154 MG/DL (ref 70–99)
GLUCOSE SERPL-MCNC: 138 MG/DL (ref 70–99)
HCT VFR BLD AUTO: 28.3 % (ref 40–53)
HGB BLD-MCNC: 8.9 G/DL (ref 13.3–17.7)
INR PPP: 3.29 (ref 0.86–1.14)
MAGNESIUM SERPL-MCNC: 2 MG/DL (ref 1.6–2.3)
MCH RBC QN AUTO: 28.5 PG (ref 26.5–33)
MCHC RBC AUTO-ENTMCNC: 31.4 G/DL (ref 31.5–36.5)
MCV RBC AUTO: 91 FL (ref 78–100)
PHOSPHATE SERPL-MCNC: 3.7 MG/DL (ref 2.5–4.5)
PLATELET # BLD AUTO: 268 10E9/L (ref 150–450)
POTASSIUM SERPL-SCNC: 4.3 MMOL/L (ref 3.4–5.3)
RBC # BLD AUTO: 3.12 10E12/L (ref 4.4–5.9)
SODIUM SERPL-SCNC: 134 MMOL/L (ref 133–144)
WBC # BLD AUTO: 9.4 10E9/L (ref 4–11)

## 2017-06-27 PROCEDURE — 36415 COLL VENOUS BLD VENIPUNCTURE: CPT | Performed by: SURGERY

## 2017-06-27 PROCEDURE — 80048 BASIC METABOLIC PNL TOTAL CA: CPT | Performed by: SURGERY

## 2017-06-27 PROCEDURE — 85610 PROTHROMBIN TIME: CPT | Performed by: SURGERY

## 2017-06-27 PROCEDURE — 84100 ASSAY OF PHOSPHORUS: CPT | Performed by: SURGERY

## 2017-06-27 PROCEDURE — 25000132 ZZH RX MED GY IP 250 OP 250 PS 637: Performed by: THORACIC SURGERY (CARDIOTHORACIC VASCULAR SURGERY)

## 2017-06-27 PROCEDURE — 00000146 ZZHCL STATISTIC GLUCOSE BY METER IP

## 2017-06-27 PROCEDURE — 85730 THROMBOPLASTIN TIME PARTIAL: CPT | Performed by: SURGERY

## 2017-06-27 PROCEDURE — 94640 AIRWAY INHALATION TREATMENT: CPT

## 2017-06-27 PROCEDURE — 83735 ASSAY OF MAGNESIUM: CPT | Performed by: SURGERY

## 2017-06-27 PROCEDURE — 97116 GAIT TRAINING THERAPY: CPT | Mod: GP

## 2017-06-27 PROCEDURE — 25000125 ZZHC RX 250: Performed by: THORACIC SURGERY (CARDIOTHORACIC VASCULAR SURGERY)

## 2017-06-27 PROCEDURE — 97112 NEUROMUSCULAR REEDUCATION: CPT | Mod: GP

## 2017-06-27 PROCEDURE — 25000132 ZZH RX MED GY IP 250 OP 250 PS 637: Performed by: PHYSICIAN ASSISTANT

## 2017-06-27 PROCEDURE — 21400003 ZZH R&B CCU CRITICAL UMMC

## 2017-06-27 PROCEDURE — 40000193 ZZH STATISTIC PT WARD VISIT

## 2017-06-27 PROCEDURE — 40000275 ZZH STATISTIC RCP TIME EA 10 MIN

## 2017-06-27 PROCEDURE — 25000132 ZZH RX MED GY IP 250 OP 250 PS 637: Performed by: SURGERY

## 2017-06-27 PROCEDURE — 99232 SBSQ HOSP IP/OBS MODERATE 35: CPT | Mod: GC | Performed by: INTERNAL MEDICINE

## 2017-06-27 PROCEDURE — 85027 COMPLETE CBC AUTOMATED: CPT | Performed by: SURGERY

## 2017-06-27 PROCEDURE — 25000132 ZZH RX MED GY IP 250 OP 250 PS 637: Performed by: STUDENT IN AN ORGANIZED HEALTH CARE EDUCATION/TRAINING PROGRAM

## 2017-06-27 PROCEDURE — 25000128 H RX IP 250 OP 636: Performed by: PHYSICIAN ASSISTANT

## 2017-06-27 PROCEDURE — 94640 AIRWAY INHALATION TREATMENT: CPT | Mod: 76

## 2017-06-27 PROCEDURE — 25000125 ZZHC RX 250: Performed by: SURGERY

## 2017-06-27 RX ORDER — BUMETANIDE 1 MG/1
1 TABLET ORAL DAILY
Status: DISCONTINUED | OUTPATIENT
Start: 2017-06-27 | End: 2017-06-27

## 2017-06-27 RX ORDER — BUMETANIDE 1 MG/1
1 TABLET ORAL
Status: DISCONTINUED | OUTPATIENT
Start: 2017-06-27 | End: 2017-06-30

## 2017-06-27 RX ORDER — POTASSIUM CHLORIDE 750 MG/1
20 TABLET, EXTENDED RELEASE ORAL 2 TIMES DAILY
Status: DISCONTINUED | OUTPATIENT
Start: 2017-06-27 | End: 2017-06-30 | Stop reason: HOSPADM

## 2017-06-27 RX ORDER — WARFARIN SODIUM 1 MG/1
2 TABLET ORAL
Status: COMPLETED | OUTPATIENT
Start: 2017-06-27 | End: 2017-06-27

## 2017-06-27 RX ORDER — MAGNESIUM CARB/ALUMINUM HYDROX 105-160MG
148 TABLET,CHEWABLE ORAL ONCE
Status: COMPLETED | OUTPATIENT
Start: 2017-06-27 | End: 2017-06-27

## 2017-06-27 RX ORDER — FUROSEMIDE 10 MG/ML
40 INJECTION INTRAMUSCULAR; INTRAVENOUS ONCE
Status: DISCONTINUED | OUTPATIENT
Start: 2017-06-27 | End: 2017-06-27

## 2017-06-27 RX ADMIN — HYDROMORPHONE HYDROCHLORIDE 4 MG: 2 TABLET ORAL at 15:43

## 2017-06-27 RX ADMIN — HYDROMORPHONE HYDROCHLORIDE 4 MG: 2 TABLET ORAL at 07:58

## 2017-06-27 RX ADMIN — CITALOPRAM HYDROBROMIDE 20 MG: 20 TABLET ORAL at 07:49

## 2017-06-27 RX ADMIN — MONTELUKAST SODIUM 10 MG: 10 TABLET, FILM COATED ORAL at 21:49

## 2017-06-27 RX ADMIN — BUMETANIDE 1 MG: 1 TABLET ORAL at 11:57

## 2017-06-27 RX ADMIN — HYDROMORPHONE HYDROCHLORIDE 4 MG: 2 TABLET ORAL at 04:01

## 2017-06-27 RX ADMIN — BUMETANIDE 1 MG: 1 TABLET ORAL at 15:43

## 2017-06-27 RX ADMIN — POTASSIUM CHLORIDE 20 MEQ: 750 TABLET, EXTENDED RELEASE ORAL at 20:13

## 2017-06-27 RX ADMIN — HYDROMORPHONE HYDROCHLORIDE 4 MG: 2 TABLET ORAL at 12:28

## 2017-06-27 RX ADMIN — IPRATROPIUM BROMIDE AND ALBUTEROL SULFATE 3 ML: .5; 3 SOLUTION RESPIRATORY (INHALATION) at 11:45

## 2017-06-27 RX ADMIN — IPRATROPIUM BROMIDE AND ALBUTEROL SULFATE 3 ML: .5; 3 SOLUTION RESPIRATORY (INHALATION) at 08:53

## 2017-06-27 RX ADMIN — IPRATROPIUM BROMIDE AND ALBUTEROL SULFATE 3 ML: .5; 3 SOLUTION RESPIRATORY (INHALATION) at 16:50

## 2017-06-27 RX ADMIN — ACETAMINOPHEN 650 MG: 325 TABLET, FILM COATED ORAL at 07:49

## 2017-06-27 RX ADMIN — INSULIN ASPART 1 UNITS: 100 INJECTION, SOLUTION INTRAVENOUS; SUBCUTANEOUS at 13:56

## 2017-06-27 RX ADMIN — ACETAMINOPHEN 650 MG: 325 TABLET, FILM COATED ORAL at 13:55

## 2017-06-27 RX ADMIN — METOPROLOL TARTRATE 25 MG: 25 TABLET, FILM COATED ORAL at 20:13

## 2017-06-27 RX ADMIN — HYDROMORPHONE HYDROCHLORIDE 0.2 MG: 10 INJECTION, SOLUTION INTRAMUSCULAR; INTRAVENOUS; SUBCUTANEOUS at 20:13

## 2017-06-27 RX ADMIN — WARFARIN SODIUM 2 MG: 1 TABLET ORAL at 17:34

## 2017-06-27 RX ADMIN — HYDROMORPHONE HYDROCHLORIDE 4 MG: 2 TABLET ORAL at 21:49

## 2017-06-27 RX ADMIN — SENNOSIDES AND DOCUSATE SODIUM 2 TABLET: 8.6; 5 TABLET ORAL at 07:49

## 2017-06-27 RX ADMIN — Medication 2 G: at 12:00

## 2017-06-27 RX ADMIN — POLYETHYLENE GLYCOL 3350 17 G: 17 POWDER, FOR SOLUTION ORAL at 07:52

## 2017-06-27 RX ADMIN — METOPROLOL TARTRATE 25 MG: 25 TABLET, FILM COATED ORAL at 07:51

## 2017-06-27 RX ADMIN — TRAZODONE HYDROCHLORIDE 50 MG: 50 TABLET ORAL at 21:49

## 2017-06-27 RX ADMIN — Medication 500 MCG: at 11:58

## 2017-06-27 RX ADMIN — ACETAMINOPHEN 650 MG: 325 TABLET, FILM COATED ORAL at 20:14

## 2017-06-27 RX ADMIN — POTASSIUM CHLORIDE 20 MEQ: 750 TABLET, EXTENDED RELEASE ORAL at 11:58

## 2017-06-27 RX ADMIN — ASPIRIN 81 MG CHEWABLE TABLET 81 MG: 81 TABLET CHEWABLE at 07:48

## 2017-06-27 RX ADMIN — PANTOPRAZOLE SODIUM 40 MG: 40 TABLET, DELAYED RELEASE ORAL at 07:50

## 2017-06-27 RX ADMIN — IPRATROPIUM BROMIDE AND ALBUTEROL SULFATE 3 ML: .5; 3 SOLUTION RESPIRATORY (INHALATION) at 20:57

## 2017-06-27 RX ADMIN — MAGNESIUM CITRATE 148 ML: 1.75 LIQUID ORAL at 11:56

## 2017-06-27 RX ADMIN — HYDROMORPHONE HYDROCHLORIDE 0.2 MG: 10 INJECTION, SOLUTION INTRAMUSCULAR; INTRAVENOUS; SUBCUTANEOUS at 05:49

## 2017-06-27 RX ADMIN — HYDROMORPHONE HYDROCHLORIDE 4 MG: 2 TABLET ORAL at 00:21

## 2017-06-27 ASSESSMENT — PAIN DESCRIPTION - DESCRIPTORS
DESCRIPTORS: DISCOMFORT;SORE
DESCRIPTORS: DISCOMFORT;ACHING
DESCRIPTORS: ACHING;SHARP
DESCRIPTORS: DISCOMFORT;SORE
DESCRIPTORS: DISCOMFORT
DESCRIPTORS: ACHING;DISCOMFORT;SORE

## 2017-06-27 NOTE — PROGRESS NOTES
CVTS Daily Note  6/27/2017  Attending: Ronnie Quigley MD    S:   No overnight events.  Feels good overall, some SOB with activity but otherwise no acute complaints.      Denies F/C/Sweats.  No CP, SOB, or calf pain.    Tolerating diet, keeping to soft stuff until + BM.  + Flatus.    Ambulated well with assistance and stops to catch breath.   Pain controlled well.    O:   Vitals:    06/27/17 0021 06/27/17 0355 06/27/17 0500 06/27/17 0737   BP: 94/82   94/82   BP Location: Left arm   Right arm   Pulse:       Resp: 16 18 18   Temp: 97.7  F (36.5  C)   98.8  F (37.1  C)   TempSrc: Oral   Oral   SpO2: 97%   97%   Weight:   115.7 kg (255 lb)    Height:         Vitals:    06/23/17 0501 06/24/17 2306 06/27/17 0500   Weight: 114 kg (251 lb 4.8 oz) 115.2 kg (253 lb 14.4 oz) 115.7 kg (255 lb)     + 1 kg since admit      Intake/Output Summary (Last 24 hours) at 06/27/17 0937  Last data filed at 06/27/17 0800   Gross per 24 hour   Intake          1278.17 ml   Output             1180 ml   Net            98.17 ml       MAPs: 70-80's   Gen: AAO x 3, pleasant, NAD  CV: LVAD humming,  no murmurs, rubs, or gallops.   Pulm: CTA, no rhonchi or wheezes  Abd: distended but soft, non-tender, no guarding  Ext: + peripheral edema, 1 + pitting  Incision: clean, dry, intact, no erythema  Chest Tube sites: dressings clean and dry   Lines: driveline dressing clean and dry   LVAD: speed 9200, flow 5.1 - 5.7, PI 5.1 - 5.6, power 5.6.       Labs:  [facility]    Recent Labs  Lab 06/27/17  0817 06/26/17  0606 06/25/17  0826 06/24/17  0736    135 136 135   POTASSIUM 4.3 4.0 4.1 4.2   CHLORIDE 101 103 104 104   QAMAR 8.9 9.0 8.6 8.6   CO2 25 24 24 24   BUN 28 28 34* 36*   CR 1.39* 1.47* 1.62* 1.90*   * 118* 116* 113*     CBC    Recent Labs  Lab 06/27/17  0817 06/26/17  0606 06/25/17  0826 06/24/17  0736   WBC 9.4 9.4 9.6 8.4   RBC 3.12* 2.90* 2.83* 2.84*   HGB 8.9* 8.3* 8.1* 8.3*   HCT 28.3* 26.2* 25.8* 25.9*   MCV 91 90 91 91   MCH 28.5 28.6 28.6  29.2   MCHC 31.4* 31.7 31.4* 32.0   RDW 14.3 14.0 13.8 13.8    218 190 154     INR    Recent Labs  Lab 06/27/17  0817 06/26/17  0606 06/25/17  0826 06/24/17  1021   INR 3.29* 2.83* 2.41* 2.03*      Hepatic Panel   Lab Results   Component Value Date    AST 23 06/15/2017     Lab Results   Component Value Date    ALT 26 06/15/2017     Lab Results   Component Value Date    ALBUMIN 3.8 06/15/2017     GLUCOSE:     Recent Labs  Lab 06/27/17  0817 06/27/17  0743 06/27/17  0245 06/26/17  2119 06/26/17  1716 06/26/17  1301 06/26/17  0606 06/26/17  0257  06/25/17  0826  06/24/17  0736  06/23/17  0626  06/22/17  0418   *  --   --   --   --   --  118*  --   --  116*  --  113*  --  135*  --  121*   BGM  --  123* 125* 151* 139* 175*  --  125*  < >  --   < >  --   < >  --   < >  --    < > = values in this interval not displayed.      Imaging:   CXR 6/27-   1. Interval removal of the right chest tube. No pneumothorax.  2. Stable perihilar and bibasilar opacities, likely a combination of pulmonary edema and atelectasis.  3. Small left pleural effusion, unchanged.      A/P:   Jim Willingham is a 62 year old male with NICM (EF 20%) s/p Bi-V ICD, severe MR, VT, paroxsymal afib, DM, HTN, CKD, CECILIA, who is s/p HeartMate II left ventricular assist device placement 6/19 with Dr Quigley. IABP removed POD #1.       Neuro:   - Evaristo APAP, prn dilaudidq 3, prn IV dilaudid, prn flexeril   - PTA meds; Celexa, Trazodone      CV:   - HM II lvad as DT, stage D NYHA Class IIIB  - ASA 81 mg  - Nonsustained SVT/A-fib. Replace Lytes PRN. Metoprolol 25 mg BID per Cards II.       Pulm:   - Extubated POD #2.   - IS, deep breathe, Nebs q 6 hrs  - Pleurals out 6/26, CXR pending.       ID:   - Completed jason-op ABx. Afebrile. WBC wnl.       GI / FEN:    - Hx gastric bypass, B12 supplement.   - Reg diet, bowel regimen.   - Recommended to patient to remain soft/liquid diet until + BM  - Mag Citrate and suppository 6/27.       Renal / :   - CKD stage  III. Creat 1.39   - Bumex 1 mg PO BID starting 6/27 (avoiding IV meds)       Heme:   - Hgb 8's. Plts > 200      Endo:   - Sliding scale insulin   - Holding metformin and allopurinol for elevated Creatinine      PPX:   - Protonix      Anticoagulation:   - Coumadin for LVAD, goal 2-3.  INR supra-therapeutic 3.29.        Dispo:   - 6C  - May be taking LVAD test in 1-2 days. If so, then DC home with assist.   - Will DC to TCU 1-2 days if more VAD teaching is needed.       Staff surgeons have been informed of changes through both  verbal and written communication.      Nash Amado PA-C  Cardiothoracic Surgery  Pager [phone]    9:37 AM   June 27, 2017        Patient seen and examined. Continue anticoagulation with coumadin. Continue current management plan. Agree with the findings in the Advanced Care Provider's note. I spent a total of 20 minutes examining the patient, reviewing investigations and therapeutic counseling.

## 2017-06-27 NOTE — PLAN OF CARE
Problem: Goal Outcome Summary  Goal: Goal Outcome Summary  Pt alert and oriented.  VSS on 2L NC.  Paced rhythm.  Heartmate 2 LVAD, numbers WNL, no alarms notes this shift.  Incisional pain controlled w/ PO/IV dilaudid.  CXR post-CT pull done this afternoon.  Encouraging IS use.  No BM yet, one time does of Lactulose given w/ no results yet.  One time dose of IV 40 mg Lasix administered per orders.  Pt w/ adequate UOP.  LVAD education tomorrow at 1000.  Continue w/ plan of care and notify team w/ changes/concerns.

## 2017-06-27 NOTE — PLAN OF CARE
Problem: Goal Outcome Summary  Goal: Goal Outcome Summary  PT 6C: Ambulates 50ftx2 and 100ft with FWW and CGAx1.  Multiple minor LOB with IND correction.  Portillo Balance scale administered  with score of 36/56; indicating medium falls risk and currently need needs AD to increase safety of gait.  Pending progress with PT, anticipate possible d/c home vs rehab stay.

## 2017-06-27 NOTE — PLAN OF CARE
Problem: Goal Outcome Summary  Goal: Goal Outcome Summary  Outcome: Improving  Pt s/p LVAD HM2 placement 6/19. Pt A/O. VSS. Paced rhythm. RA, wears CPAP at night. LVAD #s WNL, pt and pt spouse worked with LVAD coordinator this AM.  Pain managed with PRN PO Dilaudid and scheduled Tylenol. Up with SB assist. Pt had BM this afternoon following Mag citrate administration; suppository not given per pt refusal, continue with bowel regimen. Mg++ replaced per protocol. BG assessed and managed with SSI. Worked with therapy this AM. Continue with plan of care and report changes to treatment team. Anticipate d/c later this week.

## 2017-06-27 NOTE — PROGRESS NOTES
Social Work Services Progress Note    Hospital Day 12  Collaborated with:  ABIGAIL Cao    Data:  SW informed that [place] is nearing readiness to d/c to [facility] soon.    Intervention:  SW attempted to meet with Jim to discuss.    Assessment:  Coolidge was with another provider when SW attempted to visit.    Plan:    Anticipated Disposition:  Facility:  [facility]    Barriers to d/c plan:  New lvad    Follow Up:  SW will continue to be available for support and d/c planning as needed.    Pager [phone]

## 2017-06-27 NOTE — PROGRESS NOTES
Portillo Balance Scale (BBS) Cutoff Scores: A score of ? 45/56 indicates an increased risk for falls.   Patient Score: 36/56; indicating medium falls risk and need for AD during mobility.       The BBS is a measure of static and dynamic standing balance that has been validated in community dwelling elderly individuals and individuals who have Parkinson's Disease, MS, and those who are s/p CVA and TBI. The test is administered without an assistive device. Scores from the Portillo are used to determine the probability of falling based on the patient's previous history of falls and their test performance.     Minimal Detectable Change = 6.5   & Minimal Detectable Change (Parkinson's Disease) = 5 according to Abdullahi & Paney 2008    Assessment (rationale for performing, application to patient s function & care plan): BBS administered in order to assess falls risk and safety without AD.  Balance interventions will be provided in order to increase functional stability.    Minutes billed as physical performance test: 8

## 2017-06-27 NOTE — PLAN OF CARE
Problem: Goal Outcome Summary  Goal: Goal Outcome Summary  OT/6C:  Cancel - pt with other provider upon attempt this AM, schedule did not allow for check back.  Will reschedule.

## 2017-06-27 NOTE — PROGRESS NOTES
Cardiology Progress Note     Events and interval changes in past 24 hours:   Denies dyspnea, orthoponea, palpitations, presyncope or chest pain  9 beats of NSVT this AM    OBJECTIVE FINDINGS:  Temp: 98.3  F (36.8  C) Temp  Min: 97.7  F (36.5  C)  Max: 98.8  F (37.1  C)  Resp: 18 Resp  Min: 16  Max: 18  SpO2: 98 % SpO2  Min: 94 %  Max: 100 % 2.5L Bipap at night    No Data Recorded  Heart Rate: 87 Heart Rate  Min: 9  Max: 92  BP: (!) 89/76 Systolic (24hrs), Av , Min:84 , Max:94     MAPs 70-80    HM2 VAD flow 5 PI 5.5 speed 9000 power 5    Gen: alert  Resp: clear to auscultation bilaterally, no crackles or wheezing   CV: VAD hum, JVP 6cm, trace peripheral edema  Abd:soft, nontender,     1/1.2 UO yest  net +0.3  today /0.5urine  net -0.2  -1.3 net for admission    Wt after  lbs    Vitals:    17 0415 17 0600 17 0501 17 2306   Weight: 114.5 kg (252 lb 6.8 oz) 110.5 kg (243 lb 9.7 oz) 114 kg (251 lb 4.8 oz) 115.2 kg (253 lb 14.4 oz)    17 0500   Weight: 115.7 kg (255 lb)     Inhaled NO    Warfarin Therapy Reminder       Reason beta blocker order not selected       dextrose 5% and 0.45% NaCl 30 mL/hr at 17 0800       potassium chloride  20 mEq Oral BID     bumetanide  1 mg Oral BID     ipratropium - albuterol 0.5 mg/2.5 mg/3 mL  3 mL Nebulization 4x daily     metoprolol  25 mg Oral BID     polyethylene glycol  17 g Oral BID     bisacodyl  10 mg Rectal Daily     acetaminophen  650 mg Oral Q6H     citalopram  20 mg Oral Daily     cyanocobalamin  500 mcg Sublingual Q48H     montelukast  10 mg Oral At Bedtime     fluticasone-vilanterol  1 puff Inhalation Daily     pantoprazole  40 mg Oral QAM     insulin aspart  1-7 Units Subcutaneous TID AC     insulin aspart  1-5 Units Subcutaneous At Bedtime     aspirin  81 mg Oral or Feeding Tube Daily     traZODone  50 mg Oral At Bedtime     sodium chloride (PF)  3 mL Intracatheter Q8H     senna-docusate  1-2 tablet Oral BID     pneumococcal  vaccine  0.5 mL Intramuscular Prior to discharge   ipratropium - albuterol 0.5 mg/2.5 mg/3 mL, umeclidinium, traMADol, HYDROmorphone, cyclobenzaprine, HYDROmorphone, albuterol, glucose **OR** dextrose **OR** glucagon, Warfarin Therapy Reminder, naloxone, lidocaine, lidocaine 4%, sodium chloride (PF), Reason beta blocker order not selected, potassium chloride, potassium chloride, potassium chloride, potassium chloride with lidocaine, potassium chloride, magnesium sulfate, magnesium sulfate, potassium phosphate (KPHOS) in D5W IV, potassium phosphate (KPHOS) in D5W IV, potassium phosphate (KPHOS) in D5W IV, potassium phosphate (KPHOS) in D5W IV, potassium phosphate (KPHOS) in D5W IV, ondansetron **OR** ondansetron    Albumin 3.5  CMP    Recent Labs  Lab 06/27/17  0817 06/26/17  0606 06/25/17  0826 06/24/17  0736    135 136 135   POTASSIUM 4.3 4.0 4.1 4.2   CHLORIDE 101 103 104 104   CO2 25 24 24 24   ANIONGAP 8 8 8 7   * 118* 116* 113*   BUN 28 28 34* 36*   CR 1.39* 1.47* 1.62* 1.90*   GFRESTIMATED 52* 49* 44* 36*   GFRESTBLACK 63 59* 53* 44*   QAMAR 8.9 9.0 8.6 8.6   MAG 2.0 2.1 2.3 2.4*   PHOS 3.7 3.2 2.9 2.9     CBC    Recent Labs  Lab 06/27/17  0817 06/26/17  0606 06/25/17  0826 06/24/17  0736   WBC 9.4 9.4 9.6 8.4   RBC 3.12* 2.90* 2.83* 2.84*   HGB 8.9* 8.3* 8.1* 8.3*   HCT 28.3* 26.2* 25.8* 25.9*   MCV 91 90 91 91   MCH 28.5 28.6 28.6 29.2   MCHC 31.4* 31.7 31.4* 32.0   RDW 14.3 14.0 13.8 13.8    218 190 154     INR    Recent Labs  Lab 06/27/17  0817 06/26/17  0606 06/25/17  0826 06/24/17  1021   INR 3.29* 2.83* 2.41* 2.03*     Arterial Blood Gas    Recent Labs  Lab 06/23/17  0725 06/22/17  0415 06/21/17  1559 06/21/17  0900 06/21/17  0657   PH  --  7.35 7.34* 7.39 7.37   PCO2  --  43 43 33* 37   PO2  --  84 114* 124* 137*   HCO3  --  24 23 20* 22   O2PER 3L 3L  3L 4L 4L 40     Imaging and other studies:  EKG: Vpaced rhythm    Echocardiogram: before LVAD 6/12  Severe left ventricular dilation  is present. LVIDd 8.0 cm.  Severe diffuse hypokinesis is present. Posterior wall akinesis is present.  The Ejection Fraction is estimated at 20-25%.Traced Biplane LVEF is 23%.  Moderate-Severe functional Mitral Regurgitation. There is tenting of the mitral leaflets with restriction of the posterior mitral leaflet. ERO is 0.3  cm^2. RV 43 ml.  Mild right ventricular dilation is present. Global right ventricular function is normal.  Dilation of the inferior vena cava is present with normal respiratory variation in diameter.    Echo after LVAD 6/23  Interpretation Summary  HM 2 at 9000 RPM.     LVAD cannula was seen in the expected anatomic position in the LV apex. LV end  diastolic diameter is 5.4cm. The Ejection Fraction is estimated at <20%.  The aortic valve opens intermittently.  Septum normal.  The inferior vena cava was normal in size with preserved respiratory  Variability.  Mild RV dysfunction      Device settings DDDR  VT/VF ATP and defib, 188  monitor    ASSESSMENT:    62M h/o NICM (EF 20%) s/p Bi-V ICD, severe MR, VT, paroxsymal afib, DM, HTN, CKD, CECILIA, MDD, admitted for gradual shortness of breath, likely 2/2 to inadequate maintenance diuretics. Improving with BiPAP.     # NICM s/p CRT-D s/p HM2 LVAD as DT 6/20, s/p [id number]  in May, extubated 6/21  # Mild RV function after LVAD, normal RV function after LVAD  # Severe functional MR-resolved after VAD  # SVT 6/23-6/24  # NSVT 6/25    # WALTER-unclear etiology-likely hypoperfusion from surgery-improving    # Gout-left elbow    # hx of VT  # pAF  #HTN  #CKD-basline Cr 1.4  #CECILIA    Recommendations  - Diuresis-evquivalent of lasix 40 IV BID  - Metoprolol 25 BID for NSVT/SVT  - Goal MAP<90.   - aspirin 81, warfarin INR therapuetic  -optimize LVAD speed: increased to 9000 on 6/20    Will staff with Dr. Ulises Gómez  General cards fellow, PGY4  Pager [phone]

## 2017-06-27 NOTE — PROGRESS NOTES
CLINICAL NUTRITION SERVICES - REASSESSMENT NOTE     Nutrition Prescription    RECOMMENDATIONS FOR MDs/PROVIDERS TO ORDER:  Order kcal counts if oral intake decreases. TF recs in nutrition note 6/20 if needed.    Recommendations already ordered by Registered Dietitian (RD):  Modified oral supplements    Future/Additional Recommendations:  1. Continue current diet as ordered. Pt has high protein needs. Encourage high protein options. Monitor fluid status and potential need for a 2 g sodium diet (would need to be added as a Combination Diet) and potentially a fluid restriction.  2. Monitor glycemic control. Hgb A1c of 6.7 on 5/24/17 and DM hx.   3. Due to gastric bypass history, rec order a multivitamin with minerals (chewable BID), calcium with vitamin D TID, and an optional vitamin B complex. Also, rec possibly increase vitamin B 12 (500 mcg oral daily or 1000 mcg injection once per month).  4. Consider checking vitamin A, vitamin D, and vitamin E, vitamin B1, PTH, folate, and zinc. No recent labs per chart review.      EVALUATION OF THE PROGRESS TOWARD GOALS   Diet: High Consistent Carbohydrate diet since 6/22. He was extubated on 6/21 and diet was advanced to clear liquids 6/21, and then to a 2 g sodium diet 6/22. Ordered to receive berry Ensure Clear at 10:00, 14:00, and HS  Intake: Per nursing flowsheets, fair diet tolerance. Flowsheets indicate pt consuming 50% of meals with a fair appetite 6/22, 25% of meals with a fair appetite 6/24, and % of meals with a fair to good appetite 6/26. Pt was busy with nursing staff during first attempt and then sleeping during second attempt. Pt has several berry Ensure Clear supplements in his room. Hark indicates food/beverages sent 6/25-6/26 totaled 1428 kcals and 51 g protein daily on average - although suspect pt is eating % of meals. Possible factors that may be affecting oral intake include post-op status, constipation, and diet order.     NEW FINDINGS    N/A    ASSESSED NUTRITION NEEDS (updated)  Dosing Weight: 80 kg (adjusted, based on lowest wt this admission of 110.5 kg on 6/22)  Estimated Energy Needs: 7580-6938 kcals/day (20 - 25 kcals/kg)  Justification: Obese and Vented  Estimated Protein Needs: 120-160 grams protein/day (1.5 - 2 grams of pro/kg)  Justification: Increased needs s/p LVAD placement  Estimated Fluid Needs: (1 mL/kcal)   Justification: Per provider, pending fluid status    MALNUTRITION  % Intake: Suspect < 75% for > 7 days (non-severe), on average  % Weight Loss: Difficult to assess actual wt loss with changes in fluid status, post-op status, and diuresis  Subcutaneous Fat Loss: None observed per previous RD. Pt covered and unable to assess 6/27.   Muscle Loss: None observed per previous RD. Pt covered and unable to assess 6/27.   Fluid Accumulation/Edema: Does not meet criteria  Malnutrition Diagnosis: Patient does not meet two of the above criteria necessary for diagnosing malnutrition but is at risk for malnutrition    Previous Goals   Diet adv v nutrition support within 1-2 days.  Evaluation: Met    Previous Nutrition Diagnosis  Inadequate protein-energy intake related to intubated on vent and sedated as evidenced by NPO status with no nutrition support at present.    Evaluation: Unresolved. Changed to new nutrition dx below.     CURRENT NUTRITION DIAGNOSIS  Inadequate oral intake related to post-op status, constipation, and diet order as evidenced by pt consuming 25-50% of meals at times.    INTERVENTIONS  Implementation  Medical food supplement therapy: Left oral supplement menu in his room. Discontinued Ensure Clear at 10:00 and 14:00. Ordered citrus splash Pro-stat and diet lemonade to be sent at HS.   Nutrition education: Left room service handout in his room depicting protein amounts in foods and beverages on the room service menu. Wrote his protein and kcal goals on this handout.    Goals  Patient to consume % of nutritionally  adequate meal trays TID, or the equivalent with supplements/snacks.    Monitoring/Evaluation  Progress toward goals will be monitored and evaluated per protocol.      Nutrition will continue to follow.    Sana Costello MS, RD, LD, [facility]   6C Pgr:  [phone]

## 2017-06-27 NOTE — PLAN OF CARE
Problem: Goal Outcome Summary  Goal: Goal Outcome Summary  Outcome: No Change  D: S/p LVAD HM II 6/19  I/A: A&Ox4. VSS on 2L/Cpap overnight. V-paced 80s. LVAD #s wnl, no alarms noted. C/o incisional pain partially relieved by PRN dilaudid IV and PO. Adequate uop. No BM since 6/18. Up with 1-assist. Appeared to sleep comfortably overnight.   P: Encourage pulmonary hygiene. Advance bowel regimen. Continue to monitor and notify CVTS with questions/concerns.

## 2017-06-28 ENCOUNTER — APPOINTMENT (OUTPATIENT)
Dept: PHYSICAL THERAPY | Facility: CLINIC | Age: 62
DRG: 001 | End: 2017-06-28
Attending: PHYSICIAN ASSISTANT
Payer: COMMERCIAL

## 2017-06-28 LAB
ANION GAP SERPL CALCULATED.3IONS-SCNC: 6 MMOL/L (ref 3–14)
APTT PPP: 94 SEC (ref 22–37)
BUN SERPL-MCNC: 24 MG/DL (ref 7–30)
CALCIUM SERPL-MCNC: 8.8 MG/DL (ref 8.5–10.1)
CHLORIDE SERPL-SCNC: 100 MMOL/L (ref 94–109)
CO2 SERPL-SCNC: 26 MMOL/L (ref 20–32)
CREAT SERPL-MCNC: 1.44 MG/DL (ref 0.66–1.25)
ERYTHROCYTE [DISTWIDTH] IN BLOOD BY AUTOMATED COUNT: 14.4 % (ref 10–15)
GFR SERPL CREATININE-BSD FRML MDRD: 50 ML/MIN/1.7M2
GLUCOSE BLDC GLUCOMTR-MCNC: 107 MG/DL (ref 70–99)
GLUCOSE BLDC GLUCOMTR-MCNC: 110 MG/DL (ref 70–99)
GLUCOSE BLDC GLUCOMTR-MCNC: 117 MG/DL (ref 70–99)
GLUCOSE SERPL-MCNC: 129 MG/DL (ref 70–99)
HCT VFR BLD AUTO: 28.4 % (ref 40–53)
HGB BLD-MCNC: 8.9 G/DL (ref 13.3–17.7)
INR PPP: 3.03 (ref 0.86–1.14)
MAGNESIUM SERPL-MCNC: 2.7 MG/DL (ref 1.6–2.3)
MCH RBC QN AUTO: 28.5 PG (ref 26.5–33)
MCHC RBC AUTO-ENTMCNC: 31.3 G/DL (ref 31.5–36.5)
MCV RBC AUTO: 91 FL (ref 78–100)
PHOSPHATE SERPL-MCNC: 3.6 MG/DL (ref 2.5–4.5)
PLATELET # BLD AUTO: 336 10E9/L (ref 150–450)
POTASSIUM SERPL-SCNC: 4.5 MMOL/L (ref 3.4–5.3)
RBC # BLD AUTO: 3.12 10E12/L (ref 4.4–5.9)
SODIUM SERPL-SCNC: 133 MMOL/L (ref 133–144)
WBC # BLD AUTO: 9.3 10E9/L (ref 4–11)

## 2017-06-28 PROCEDURE — 25000132 ZZH RX MED GY IP 250 OP 250 PS 637: Performed by: PHYSICIAN ASSISTANT

## 2017-06-28 PROCEDURE — 85730 THROMBOPLASTIN TIME PARTIAL: CPT | Performed by: SURGERY

## 2017-06-28 PROCEDURE — 83735 ASSAY OF MAGNESIUM: CPT | Performed by: SURGERY

## 2017-06-28 PROCEDURE — 97116 GAIT TRAINING THERAPY: CPT | Mod: GP

## 2017-06-28 PROCEDURE — 97530 THERAPEUTIC ACTIVITIES: CPT | Mod: GP

## 2017-06-28 PROCEDURE — 25000132 ZZH RX MED GY IP 250 OP 250 PS 637: Performed by: STUDENT IN AN ORGANIZED HEALTH CARE EDUCATION/TRAINING PROGRAM

## 2017-06-28 PROCEDURE — 85610 PROTHROMBIN TIME: CPT | Performed by: SURGERY

## 2017-06-28 PROCEDURE — 84100 ASSAY OF PHOSPHORUS: CPT | Performed by: SURGERY

## 2017-06-28 PROCEDURE — 00000146 ZZHCL STATISTIC GLUCOSE BY METER IP

## 2017-06-28 PROCEDURE — 40000193 ZZH STATISTIC PT WARD VISIT

## 2017-06-28 PROCEDURE — 36415 COLL VENOUS BLD VENIPUNCTURE: CPT | Performed by: SURGERY

## 2017-06-28 PROCEDURE — 99207 ZZC NO BILLABLE SERVICE THIS VISIT: CPT | Performed by: INTERNAL MEDICINE

## 2017-06-28 PROCEDURE — 94640 AIRWAY INHALATION TREATMENT: CPT

## 2017-06-28 PROCEDURE — 94640 AIRWAY INHALATION TREATMENT: CPT | Mod: 76

## 2017-06-28 PROCEDURE — 40000275 ZZH STATISTIC RCP TIME EA 10 MIN

## 2017-06-28 PROCEDURE — 25000132 ZZH RX MED GY IP 250 OP 250 PS 637: Performed by: SURGERY

## 2017-06-28 PROCEDURE — 21400003 ZZH R&B CCU CRITICAL UMMC

## 2017-06-28 PROCEDURE — 80048 BASIC METABOLIC PNL TOTAL CA: CPT | Performed by: SURGERY

## 2017-06-28 PROCEDURE — 85027 COMPLETE CBC AUTOMATED: CPT | Performed by: SURGERY

## 2017-06-28 PROCEDURE — 25000125 ZZHC RX 250: Performed by: SURGERY

## 2017-06-28 PROCEDURE — 25000132 ZZH RX MED GY IP 250 OP 250 PS 637: Performed by: THORACIC SURGERY (CARDIOTHORACIC VASCULAR SURGERY)

## 2017-06-28 RX ORDER — WARFARIN SODIUM 3 MG/1
3 TABLET ORAL
Status: COMPLETED | OUTPATIENT
Start: 2017-06-28 | End: 2017-06-28

## 2017-06-28 RX ADMIN — ASPIRIN 81 MG CHEWABLE TABLET 81 MG: 81 TABLET CHEWABLE at 08:36

## 2017-06-28 RX ADMIN — ACETAMINOPHEN 650 MG: 325 TABLET, FILM COATED ORAL at 02:18

## 2017-06-28 RX ADMIN — CITALOPRAM HYDROBROMIDE 20 MG: 20 TABLET ORAL at 08:36

## 2017-06-28 RX ADMIN — HYDROMORPHONE HYDROCHLORIDE 4 MG: 2 TABLET ORAL at 02:18

## 2017-06-28 RX ADMIN — IPRATROPIUM BROMIDE AND ALBUTEROL SULFATE 3 ML: .5; 3 SOLUTION RESPIRATORY (INHALATION) at 16:44

## 2017-06-28 RX ADMIN — BUMETANIDE 1 MG: 1 TABLET ORAL at 16:13

## 2017-06-28 RX ADMIN — METFORMIN HYDROCHLORIDE 500 MG: 500 TABLET ORAL at 09:58

## 2017-06-28 RX ADMIN — SENNOSIDES AND DOCUSATE SODIUM 2 TABLET: 8.6; 5 TABLET ORAL at 08:37

## 2017-06-28 RX ADMIN — HYDROMORPHONE HYDROCHLORIDE 4 MG: 2 TABLET ORAL at 10:01

## 2017-06-28 RX ADMIN — IPRATROPIUM BROMIDE AND ALBUTEROL SULFATE 3 ML: .5; 3 SOLUTION RESPIRATORY (INHALATION) at 21:05

## 2017-06-28 RX ADMIN — HYDROMORPHONE HYDROCHLORIDE 4 MG: 2 TABLET ORAL at 13:35

## 2017-06-28 RX ADMIN — METOPROLOL TARTRATE 25 MG: 25 TABLET, FILM COATED ORAL at 20:27

## 2017-06-28 RX ADMIN — POTASSIUM CHLORIDE 20 MEQ: 750 TABLET, EXTENDED RELEASE ORAL at 08:35

## 2017-06-28 RX ADMIN — WARFARIN SODIUM 3 MG: 3 TABLET ORAL at 17:08

## 2017-06-28 RX ADMIN — ACETAMINOPHEN 650 MG: 325 TABLET, FILM COATED ORAL at 08:35

## 2017-06-28 RX ADMIN — ACETAMINOPHEN 650 MG: 325 TABLET, FILM COATED ORAL at 20:27

## 2017-06-28 RX ADMIN — HYDROMORPHONE HYDROCHLORIDE 4 MG: 2 TABLET ORAL at 20:27

## 2017-06-28 RX ADMIN — METFORMIN HYDROCHLORIDE 500 MG: 500 TABLET ORAL at 17:08

## 2017-06-28 RX ADMIN — ACETAMINOPHEN 650 MG: 325 TABLET, FILM COATED ORAL at 13:37

## 2017-06-28 RX ADMIN — HYDROMORPHONE HYDROCHLORIDE 4 MG: 2 TABLET ORAL at 23:46

## 2017-06-28 RX ADMIN — POTASSIUM CHLORIDE 20 MEQ: 750 TABLET, EXTENDED RELEASE ORAL at 20:27

## 2017-06-28 RX ADMIN — IPRATROPIUM BROMIDE AND ALBUTEROL SULFATE 3 ML: .5; 3 SOLUTION RESPIRATORY (INHALATION) at 11:37

## 2017-06-28 RX ADMIN — IPRATROPIUM BROMIDE AND ALBUTEROL SULFATE 3 ML: .5; 3 SOLUTION RESPIRATORY (INHALATION) at 07:23

## 2017-06-28 RX ADMIN — MONTELUKAST SODIUM 10 MG: 10 TABLET, FILM COATED ORAL at 21:17

## 2017-06-28 RX ADMIN — PANTOPRAZOLE SODIUM 40 MG: 40 TABLET, DELAYED RELEASE ORAL at 08:36

## 2017-06-28 RX ADMIN — BUMETANIDE 1 MG: 1 TABLET ORAL at 08:36

## 2017-06-28 RX ADMIN — HYDROMORPHONE HYDROCHLORIDE 4 MG: 2 TABLET ORAL at 16:52

## 2017-06-28 RX ADMIN — METOPROLOL TARTRATE 25 MG: 25 TABLET, FILM COATED ORAL at 08:36

## 2017-06-28 RX ADMIN — TRAZODONE HYDROCHLORIDE 50 MG: 50 TABLET ORAL at 21:17

## 2017-06-28 RX ADMIN — HYDROMORPHONE HYDROCHLORIDE 4 MG: 2 TABLET ORAL at 06:12

## 2017-06-28 ASSESSMENT — PAIN DESCRIPTION - DESCRIPTORS
DESCRIPTORS: DISCOMFORT;SORE
DESCRIPTORS: ACHING;DISCOMFORT;SORE
DESCRIPTORS: ACHING
DESCRIPTORS: DISCOMFORT;SORE
DESCRIPTORS: ACHING
DESCRIPTORS: DISCOMFORT;SORE
DESCRIPTORS: ACHING

## 2017-06-28 NOTE — PROGRESS NOTES
CVTS Daily Note  6/28/2017  Attending: Ronnie Quigley MD    S:   No overnight events. Paced at 80 bpm.     Pt seen at bedside resting comfortably.    Does need to study LVAD parameters and alarms, otherwise no acute complaints.      Denies F/C/Sweats.  No CP, SOB, or calf pain.    Tolerating diet.  + BM.  + Flatus.    Ambulated well with assistance.    Pain controlled well.    O:   Vitals:    06/27/17 2350 06/28/17 0409 06/28/17 0620 06/28/17 0758   BP: 93/81   (!) 79/67   BP Location: Right arm   Right arm   Pulse:       Resp: 18 16  16   Temp: 98.8  F (37.1  C)   98.5  F (36.9  C)   TempSrc: Oral   Oral   SpO2: 99%   96%   Weight:   114.9 kg (253 lb 3.2 oz)    Height:         Vitals:    06/24/17 2306 06/27/17 0500 06/28/17 0620   Weight: 115.2 kg (253 lb 14.4 oz) 115.7 kg (255 lb) 114.9 kg (253 lb 3.2 oz)     + 0 kg since admit      Intake/Output Summary (Last 24 hours) at 06/28/17 0900  Last data filed at 06/28/17 0620   Gross per 24 hour   Intake              640 ml   Output             1150 ml   Net             -510 ml       MAPs: 74 - 83  Gen: AAO x 3, pleasant, NAD  CV: LVAD humming, no murmurs, rubs, or gallops. + JVD  Pulm: CTA, no rhonchi or wheezes  Abd: soft, non-tender, no guarding  Ext: moderate peripheral edema, 2 + pitting  Incision: clean, dry, intact, no erythema  Chest Tube sites: dressings clean and dry  Lines: driveline dressing clean and dry   LVAD: speed 9200, flow 5.3 - 5.8, PI 4.8 - 5.7, power 5.7 - 6.       Labs:  [facility]    Recent Labs  Lab 06/28/17  0727 06/27/17  0817 06/26/17  0606 06/25/17  0826    134 135 136   POTASSIUM 4.5 4.3 4.0 4.1   CHLORIDE 100 101 103 104   QAMAR 8.8 8.9 9.0 8.6   CO2 26 25 24 24   BUN 24 28 28 34*   CR 1.44* 1.39* 1.47* 1.62*   * 138* 118* 116*     CBC    Recent Labs  Lab 06/28/17  0727 06/27/17  0817 06/26/17  0606 06/25/17  0826   WBC 9.3 9.4 9.4 9.6   RBC 3.12* 3.12* 2.90* 2.83*   HGB 8.9* 8.9* 8.3* 8.1*   HCT 28.4* 28.3* 26.2* 25.8*   MCV 91 91 90  91   MCH 28.5 28.5 28.6 28.6   MCHC 31.3* 31.4* 31.7 31.4*   RDW 14.4 14.3 14.0 13.8    268 218 190     INR    Recent Labs  Lab 06/28/17  0727 06/27/17  0817 06/26/17  0606 06/25/17  0826   INR 3.03* 3.29* 2.83* 2.41*      Hepatic Panel   Lab Results   Component Value Date    AST 23 06/15/2017     Lab Results   Component Value Date    ALT 26 06/15/2017     Lab Results   Component Value Date    ALBUMIN 3.8 06/15/2017     GLUCOSE:     Recent Labs  Lab 06/28/17  0727 06/28/17  0218 06/27/17  2127 06/27/17  1800 06/27/17  1352 06/27/17  0817 06/27/17  0743 06/27/17  0245  06/26/17  0606  06/25/17  0826  06/24/17  0736  06/23/17  0626   *  --   --   --   --  138*  --   --   --  118*  --  116*  --  113*  --  135*   BGM  --  110* 144* 123* 154*  --  123* 125*  < >  --   < >  --   < >  --   < >  --    < > = values in this interval not displayed.      Imaging:  reviewed recent imaging      A/P:   Jim Willingham is a 62 year old male with NICM (EF 20%) s/p Bi-V ICD, severe MR, VT, paroxsymal afib, DM, HTN, CKD, CECILIA, who is s/p HeartMate II left ventricular assist device placement 6/19 with Dr Quigley. IABP removed POD #1.       Neuro:   - Evaristo APAP, prn dilaudidq 3, prn IV dilaudid, prn flexeril   - PTA meds; Celexa, Trazodone      CV:   - HM II lvad as DT, stage D NYHA Class IIIB  - ASA 81 mg  - Nonsustained SVT/A-fib. Replace Lytes PRN. Metoprolol 25 mg BID per Cards II.       Pulm:   - Extubated POD #2.   - IS, deep breathe, Nebs q 6 hrs  - Pleurals out 6/26, CXR without acute changes.       ID:   - Completed jason-op ABx. Afebrile. WBC wnl.       GI / FEN:    - Hx gastric bypass, B12 supplement.   - Reg diet, bowel regimen.   - Recommended to patient to remain soft/liquid diet until + BM  - Mag Citrate and suppository 6/27. + BM 6/27 x 2.       Renal / :   - CKD stage III. Creat 1.44   - Bumex 1 mg PO BID starting 6/27 (avoiding IV meds), good UOP       Heme:   - Hgb 8's. Plts > 200      Endo:   - Stop Sliding  scale insulin   - Restarted metformin 500 mg BID, holding allopurinol for elevated Creatinine      PPX:   - Protonix      Anticoagulation:   - Coumadin for LVAD, goal 2-3.  INR supra-therapeutic 3.03.        Dispo:   - 6C  - Needs LVAD test review tomorrow  - Planning DC discussion tomorrow home with assist vs TCU (especially if more VAD teaching is needed)      Staff surgeons have been informed of changes through both  verbal and written communication.      Nash Amado PA-C  Cardiothoracic Surgery  Pager [phone]    9:00 AM   June 28, 2017

## 2017-06-28 NOTE — PLAN OF CARE
Problem: Goal Outcome Summary  Goal: Goal Outcome Summary  PT 6C: Ambulates 30ft and 40ft with FWW and w/c following. Ascends and descends 7 stairs with single railing and CGAx1. Gait/ activity distance significantly limited today due to SOB.  O2 stable in mid 90's throughout.  STS with CGAx1 and FWW.  Due to decreased activity tolerance, PT recommends d/c to ARU when medically stable in order to progress functional endurance, IND mobility and balance.

## 2017-06-28 NOTE — PLAN OF CARE
Problem: Goal Outcome Summary  Goal: Goal Outcome Summary  Outcome: Improving  Pt s/p HM2 placement 6/19. Pt A/O. VSS. Paced rhythm. RA, wears CPAP at night. Incisional pain managed with PRN PO Dilaudid and scheduled Tylenol. BG assessed and managed per protocol; pt restarted on Metformin this AM. LVAD #s WNL. LVAD education class this AM with pt and pt spouse. BM today. Up with 1 assist. Worked with therapy. Continue with plan of care and report changes to treatment team. Plan for LVAD education class tomorrow (6/29) at 1300. Anticipate d/c later this week.

## 2017-06-28 NOTE — PROGRESS NOTES
Transplant/LVAD Social Work Services Progress Note      Collaborated with: Cate Fernandez, [place] NP, and PT.    Data: Jim is nearing medical readiness to d/c from the hospital. Waiting for today's recommendations from PT of home vs. Rehab.  Intervention: Met with Jim and Cate for supportive visit. Discussed d/c plans.     Assessment: Jim is now stating that he thinks he'll do better at home if he goes to rehab first. He is agreeable to go to either [facility] or ARU (only options d/t new lvad implant). Waiting for PT/OT recommendations on level of care. Jim is excited to get home to his granddaughter, but knows that he needs to be well to be there for her. Cate reports that she agrees with going to rehab as Jim continues to use the commode and she will need him to get to the bathroom at home. Both feel like they are learning well, even though they are a little disappointed with not passing the lvad test earlier this week.   Education provided by SW: discharge levels of care.   Plan:    Discharge Plans in Progress: [facility] vs [facility]    Barriers to d/c plan: new lvad implant    Follow up Plan: SW will continue to follow for support and d/c planning as needed.    Pager [phone]

## 2017-06-28 NOTE — PROGRESS NOTES
Cardiology Progress Note     Events and interval changes in past 24 hours:   Denies dyspnea, orthoponea, palpitations, presyncope or chest pain  8 beats NSVT last evening, rare PVCs      OBJECTIVE FINDINGS:  Temp: 98.8  F (37.1  C) Temp  Min: 97.8  F (36.6  C)  Max: 98.8  F (37.1  C)  Resp: 16 Resp  Min: 16  Max: 18  SpO2: 99 % SpO2  Min: 94 %  Max: 99 % 2.5L Bipap at night    No Data Recorded  Heart Rate: 84 Heart Rate  Min: 84  Max: 94  BP: 93/81 Systolic (24hrs), Av , Min:84 , Max:94     MAPs 80-90    HM2 VAD flow 5 PI 5.5 speed 9200 (increased from 9000 on ) power 5    Gen: alert  Resp: clear to auscultation bilaterally, no crackles or wheezing   CV: VAD hum, JVP 6cm, 1-2mm peripheral edema  Abd:soft, nontender,     1.4/1.3 UO yest  net +0.06  today /0.3urine  net -0.3  -1.3 net for admission    Wt after  lbs    Vitals:    17 0600 17 0501 17 2306 17 0500   Weight: 110.5 kg (243 lb 9.7 oz) 114 kg (251 lb 4.8 oz) 115.2 kg (253 lb 14.4 oz) 115.7 kg (255 lb)    17 0620   Weight: 114.9 kg (253 lb 3.2 oz)     Inhaled NO    Warfarin Therapy Reminder       Reason beta blocker order not selected       dextrose 5% and 0.45% NaCl 30 mL/hr at 17 0800       potassium chloride  20 mEq Oral BID     bumetanide  1 mg Oral BID     ipratropium - albuterol 0.5 mg/2.5 mg/3 mL  3 mL Nebulization 4x daily     metoprolol  25 mg Oral BID     polyethylene glycol  17 g Oral BID     bisacodyl  10 mg Rectal Daily     acetaminophen  650 mg Oral Q6H     citalopram  20 mg Oral Daily     cyanocobalamin  500 mcg Sublingual Q48H     montelukast  10 mg Oral At Bedtime     fluticasone-vilanterol  1 puff Inhalation Daily     pantoprazole  40 mg Oral QAM     insulin aspart  1-7 Units Subcutaneous TID AC     insulin aspart  1-5 Units Subcutaneous At Bedtime     aspirin  81 mg Oral or Feeding Tube Daily     traZODone  50 mg Oral At Bedtime     sodium chloride (PF)  3 mL Intracatheter Q8H      senna-docusate  1-2 tablet Oral BID     pneumococcal vaccine  0.5 mL Intramuscular Prior to discharge   ipratropium - albuterol 0.5 mg/2.5 mg/3 mL, umeclidinium, traMADol, HYDROmorphone, cyclobenzaprine, HYDROmorphone, albuterol, glucose **OR** dextrose **OR** glucagon, Warfarin Therapy Reminder, naloxone, lidocaine, lidocaine 4%, sodium chloride (PF), Reason beta blocker order not selected, potassium chloride, potassium chloride, potassium chloride, potassium chloride with lidocaine, potassium chloride, magnesium sulfate, magnesium sulfate, potassium phosphate (KPHOS) in D5W IV, potassium phosphate (KPHOS) in D5W IV, potassium phosphate (KPHOS) in D5W IV, potassium phosphate (KPHOS) in D5W IV, potassium phosphate (KPHOS) in D5W IV, ondansetron **OR** ondansetron    Albumin 3.5  CMP    Recent Labs  Lab 06/27/17  0817 06/26/17  0606 06/25/17  0826 06/24/17  0736    135 136 135   POTASSIUM 4.3 4.0 4.1 4.2   CHLORIDE 101 103 104 104   CO2 25 24 24 24   ANIONGAP 8 8 8 7   * 118* 116* 113*   BUN 28 28 34* 36*   CR 1.39* 1.47* 1.62* 1.90*   GFRESTIMATED 52* 49* 44* 36*   GFRESTBLACK 63 59* 53* 44*   QAMAR 8.9 9.0 8.6 8.6   MAG 2.0 2.1 2.3 2.4*   PHOS 3.7 3.2 2.9 2.9     CBC    Recent Labs  Lab 06/27/17  0817 06/26/17  0606 06/25/17  0826 06/24/17  0736   WBC 9.4 9.4 9.6 8.4   RBC 3.12* 2.90* 2.83* 2.84*   HGB 8.9* 8.3* 8.1* 8.3*   HCT 28.3* 26.2* 25.8* 25.9*   MCV 91 90 91 91   MCH 28.5 28.6 28.6 29.2   MCHC 31.4* 31.7 31.4* 32.0   RDW 14.3 14.0 13.8 13.8    218 190 154     INR    Recent Labs  Lab 06/27/17  0817 06/26/17  0606 06/25/17  0826 06/24/17  1021   INR 3.29* 2.83* 2.41* 2.03*     Arterial Blood Gas    Recent Labs  Lab 06/23/17  0725 06/22/17  0415 06/21/17  1559 06/21/17  0900   PH  --  7.35 7.34* 7.39   PCO2  --  43 43 33*   PO2  --  84 114* 124*   HCO3  --  24 23 20*   O2PER 3L 3L  3L 4L 4L     Imaging and other studies:  EKG: Vpaced rhythm    Echocardiogram: before LVAD 6/12  Severe left  ventricular dilation is present. LVIDd 8.0 cm.  Severe diffuse hypokinesis is present. Posterior wall akinesis is present.  The Ejection Fraction is estimated at 20-25%.Traced Biplane LVEF is 23%.  Moderate-Severe functional Mitral Regurgitation. There is tenting of the mitral leaflets with restriction of the posterior mitral leaflet. ERO is 0.3  cm^2. RV 43 ml.  Mild right ventricular dilation is present. Global right ventricular function is normal.  Dilation of the inferior vena cava is present with normal respiratory variation in diameter.    Echo after LVAD 6/23  Interpretation Summary  HM 2 at 9000 RPM.     LVAD cannula was seen in the expected anatomic position in the LV apex. LV end  diastolic diameter is 5.4cm. The Ejection Fraction is estimated at <20%.  The aortic valve opens intermittently.  Septum normal.  The inferior vena cava was normal in size with preserved respiratory  Variability.  Mild RV dysfunction      Device settings DDDR  VT/VF ATP and defib, 188  monitor    ASSESSMENT:    62M h/o NICM (EF 20%) s/p Bi-V ICD, severe MR, VT, paroxsymal afib, DM, HTN, CKD, CECILIA, MDD, admitted for gradual shortness of breath, likely 2/2 to inadequate maintenance diuretics. Improving with BiPAP.     # NICM s/p CRT-D s/p HM2 LVAD as DT 6/20, s/p [id number]  in May, extubated 6/21  # Mild RV function after LVAD, normal RV function after LVAD  # Severe functional MR-resolved after VAD  # SVT 6/23-6/24  # NSVT 6/25    # WALTER-unclear etiology-likely hypoperfusion from surgery-improving    # Gout-left elbow    # hx of VT  # pAF  #HTN  #CKD-basline Cr 1.4  #CECILIA    Recommendations  - Diuresis-Bumex 1mg BID  - Metoprolol 25 BID for NSVT/SVT  - Goal MAP<90.   - aspirin 81, warfarin INR therapuetic  -optimize LVAD speed: increased to 9000 on 6/20    Will staff with Dr. Ulises Gómez  General cards fellow, PGY4  Pager [phone]

## 2017-06-28 NOTE — PLAN OF CARE
Problem: Goal Outcome Summary  Goal: Goal Outcome Summary  Pt alert and oriented.  VSS on room air, CPAP at bedtime.  Paced rhythm.  Heartmate 2 LVAD 6/19/17, numbers WNL, no alarms notes this shift.  Incisional pain controlled w/ PO/IV dilaudid/schedulked tylenol.  Large BM this evening.  Continue w/ plan of care and notify team w/ changes/concerns.

## 2017-06-28 NOTE — PLAN OF CARE
Problem: Goal Outcome Summary  Goal: Goal Outcome Summary  Outcome: No Change  D: S/p LVAD HM II 6/19  I/A: A&Ox4. VSS on RA/Cpap overnight. V-paced 80s. LVAD #s wnl, no alarms noted. C/o incisional pain partially relieved by PRN dilaudid. Adequate uop. Up with 1-assist. Appeared to sleep comfortably overnight.   P: Encourage pulmonary hygiene. Continue to monitor and notify CVTS with questions/concerns.

## 2017-06-28 NOTE — PLAN OF CARE
Problem: Goal Outcome Summary  Goal: Goal Outcome Summary  OT/6C:  Cancel - Attempted to see pt this AM, but pt at LVAD training upon attempt.  Will reschedule

## 2017-06-29 ENCOUNTER — APPOINTMENT (OUTPATIENT)
Dept: GENERAL RADIOLOGY | Facility: CLINIC | Age: 62
DRG: 001 | End: 2017-06-29
Attending: PHYSICIAN ASSISTANT
Payer: COMMERCIAL

## 2017-06-29 ENCOUNTER — APPOINTMENT (OUTPATIENT)
Dept: PHYSICAL THERAPY | Facility: CLINIC | Age: 62
DRG: 001 | End: 2017-06-29
Attending: PHYSICIAN ASSISTANT
Payer: COMMERCIAL

## 2017-06-29 ENCOUNTER — APPOINTMENT (OUTPATIENT)
Dept: OCCUPATIONAL THERAPY | Facility: CLINIC | Age: 62
DRG: 001 | End: 2017-06-29
Attending: PHYSICIAN ASSISTANT
Payer: COMMERCIAL

## 2017-06-29 LAB
ANION GAP SERPL CALCULATED.3IONS-SCNC: 9 MMOL/L (ref 3–14)
APTT PPP: 83 SEC (ref 22–37)
BUN SERPL-MCNC: 26 MG/DL (ref 7–30)
CALCIUM SERPL-MCNC: 8.9 MG/DL (ref 8.5–10.1)
CHLORIDE SERPL-SCNC: 99 MMOL/L (ref 94–109)
CO2 SERPL-SCNC: 25 MMOL/L (ref 20–32)
CREAT SERPL-MCNC: 1.62 MG/DL (ref 0.66–1.25)
GFR SERPL CREATININE-BSD FRML MDRD: 44 ML/MIN/1.7M2
GLUCOSE SERPL-MCNC: 96 MG/DL (ref 70–99)
INR PPP: 2.74 (ref 0.86–1.14)
LDH SERPL L TO P-CCNC: 343 U/L (ref 85–227)
MAGNESIUM SERPL-MCNC: 2.4 MG/DL (ref 1.6–2.3)
PHOSPHATE SERPL-MCNC: 4 MG/DL (ref 2.5–4.5)
POTASSIUM SERPL-SCNC: 4.7 MMOL/L (ref 3.4–5.3)
SODIUM SERPL-SCNC: 133 MMOL/L (ref 133–144)

## 2017-06-29 PROCEDURE — 25000128 H RX IP 250 OP 636: Performed by: PHYSICIAN ASSISTANT

## 2017-06-29 PROCEDURE — 85730 THROMBOPLASTIN TIME PARTIAL: CPT | Performed by: SURGERY

## 2017-06-29 PROCEDURE — 97535 SELF CARE MNGMENT TRAINING: CPT | Mod: GO | Performed by: OCCUPATIONAL THERAPIST

## 2017-06-29 PROCEDURE — 25000132 ZZH RX MED GY IP 250 OP 250 PS 637: Performed by: PHYSICIAN ASSISTANT

## 2017-06-29 PROCEDURE — 80048 BASIC METABOLIC PNL TOTAL CA: CPT | Performed by: SURGERY

## 2017-06-29 PROCEDURE — 25000132 ZZH RX MED GY IP 250 OP 250 PS 637: Performed by: SURGERY

## 2017-06-29 PROCEDURE — 40000275 ZZH STATISTIC RCP TIME EA 10 MIN

## 2017-06-29 PROCEDURE — 84100 ASSAY OF PHOSPHORUS: CPT | Performed by: SURGERY

## 2017-06-29 PROCEDURE — 99232 SBSQ HOSP IP/OBS MODERATE 35: CPT | Mod: GC | Performed by: INTERNAL MEDICINE

## 2017-06-29 PROCEDURE — 94640 AIRWAY INHALATION TREATMENT: CPT

## 2017-06-29 PROCEDURE — 40000133 ZZH STATISTIC OT WARD VISIT: Performed by: OCCUPATIONAL THERAPIST

## 2017-06-29 PROCEDURE — 36415 COLL VENOUS BLD VENIPUNCTURE: CPT | Performed by: SURGERY

## 2017-06-29 PROCEDURE — 25000132 ZZH RX MED GY IP 250 OP 250 PS 637: Performed by: THORACIC SURGERY (CARDIOTHORACIC VASCULAR SURGERY)

## 2017-06-29 PROCEDURE — 71020 XR CHEST 2 VW: CPT

## 2017-06-29 PROCEDURE — 97116 GAIT TRAINING THERAPY: CPT | Mod: GP | Performed by: PHYSICAL THERAPIST

## 2017-06-29 PROCEDURE — 25000125 ZZHC RX 250: Performed by: SURGERY

## 2017-06-29 PROCEDURE — 21400006 ZZH R&B CCU INTERMEDIATE UMMC

## 2017-06-29 PROCEDURE — 85610 PROTHROMBIN TIME: CPT | Performed by: SURGERY

## 2017-06-29 PROCEDURE — 25000132 ZZH RX MED GY IP 250 OP 250 PS 637: Performed by: STUDENT IN AN ORGANIZED HEALTH CARE EDUCATION/TRAINING PROGRAM

## 2017-06-29 PROCEDURE — 94640 AIRWAY INHALATION TREATMENT: CPT | Mod: 76

## 2017-06-29 PROCEDURE — 97530 THERAPEUTIC ACTIVITIES: CPT | Mod: GP | Performed by: PHYSICAL THERAPIST

## 2017-06-29 PROCEDURE — 83735 ASSAY OF MAGNESIUM: CPT | Performed by: SURGERY

## 2017-06-29 PROCEDURE — 83615 LACTATE (LD) (LDH) ENZYME: CPT | Performed by: SURGERY

## 2017-06-29 PROCEDURE — 40000193 ZZH STATISTIC PT WARD VISIT: Performed by: PHYSICAL THERAPIST

## 2017-06-29 RX ORDER — WARFARIN SODIUM 5 MG/1
5 TABLET ORAL
Status: COMPLETED | OUTPATIENT
Start: 2017-06-29 | End: 2017-06-29

## 2017-06-29 RX ORDER — AMOXICILLIN 250 MG
2 CAPSULE ORAL 2 TIMES DAILY
Status: DISCONTINUED | OUTPATIENT
Start: 2017-06-29 | End: 2017-06-30 | Stop reason: HOSPADM

## 2017-06-29 RX ORDER — BUMETANIDE 1 MG/1
1 TABLET ORAL ONCE
Status: COMPLETED | OUTPATIENT
Start: 2017-06-29 | End: 2017-06-29

## 2017-06-29 RX ADMIN — ACETAMINOPHEN 650 MG: 325 TABLET, FILM COATED ORAL at 19:29

## 2017-06-29 RX ADMIN — TRAMADOL HYDROCHLORIDE 50 MG: 50 TABLET, COATED ORAL at 22:09

## 2017-06-29 RX ADMIN — IPRATROPIUM BROMIDE AND ALBUTEROL SULFATE 3 ML: .5; 3 SOLUTION RESPIRATORY (INHALATION) at 03:16

## 2017-06-29 RX ADMIN — MONTELUKAST SODIUM 10 MG: 10 TABLET, FILM COATED ORAL at 21:08

## 2017-06-29 RX ADMIN — POTASSIUM CHLORIDE 20 MEQ: 750 TABLET, EXTENDED RELEASE ORAL at 08:26

## 2017-06-29 RX ADMIN — HYDROMORPHONE HYDROCHLORIDE 4 MG: 2 TABLET ORAL at 18:09

## 2017-06-29 RX ADMIN — HYDROMORPHONE HYDROCHLORIDE 4 MG: 2 TABLET ORAL at 13:56

## 2017-06-29 RX ADMIN — BUMETANIDE 1 MG: 1 TABLET ORAL at 12:11

## 2017-06-29 RX ADMIN — PANTOPRAZOLE SODIUM 40 MG: 40 TABLET, DELAYED RELEASE ORAL at 08:26

## 2017-06-29 RX ADMIN — IPRATROPIUM BROMIDE AND ALBUTEROL SULFATE 3 ML: .5; 3 SOLUTION RESPIRATORY (INHALATION) at 08:34

## 2017-06-29 RX ADMIN — ASPIRIN 81 MG CHEWABLE TABLET 81 MG: 81 TABLET CHEWABLE at 08:26

## 2017-06-29 RX ADMIN — METOPROLOL TARTRATE 25 MG: 25 TABLET, FILM COATED ORAL at 08:26

## 2017-06-29 RX ADMIN — SENNOSIDES AND DOCUSATE SODIUM 2 TABLET: 8.6; 5 TABLET ORAL at 08:27

## 2017-06-29 RX ADMIN — HYDROMORPHONE HYDROCHLORIDE 4 MG: 2 TABLET ORAL at 21:08

## 2017-06-29 RX ADMIN — HYDROMORPHONE HYDROCHLORIDE 0.2 MG: 10 INJECTION, SOLUTION INTRAMUSCULAR; INTRAVENOUS; SUBCUTANEOUS at 02:03

## 2017-06-29 RX ADMIN — BUMETANIDE 1 MG: 1 TABLET ORAL at 08:26

## 2017-06-29 RX ADMIN — HYDROMORPHONE HYDROCHLORIDE 4 MG: 2 TABLET ORAL at 05:47

## 2017-06-29 RX ADMIN — ACETAMINOPHEN 650 MG: 325 TABLET, FILM COATED ORAL at 02:03

## 2017-06-29 RX ADMIN — BUMETANIDE 1 MG: 1 TABLET ORAL at 16:37

## 2017-06-29 RX ADMIN — METOPROLOL TARTRATE 25 MG: 25 TABLET, FILM COATED ORAL at 19:29

## 2017-06-29 RX ADMIN — TRAZODONE HYDROCHLORIDE 50 MG: 50 TABLET ORAL at 21:08

## 2017-06-29 RX ADMIN — TRAMADOL HYDROCHLORIDE 50 MG: 50 TABLET, COATED ORAL at 16:37

## 2017-06-29 RX ADMIN — Medication 250 MG: at 18:11

## 2017-06-29 RX ADMIN — IPRATROPIUM BROMIDE AND ALBUTEROL SULFATE 3 ML: .5; 3 SOLUTION RESPIRATORY (INHALATION) at 19:56

## 2017-06-29 RX ADMIN — IPRATROPIUM BROMIDE AND ALBUTEROL SULFATE 3 ML: .5; 3 SOLUTION RESPIRATORY (INHALATION) at 16:41

## 2017-06-29 RX ADMIN — HYDROMORPHONE HYDROCHLORIDE 0.2 MG: 10 INJECTION, SOLUTION INTRAMUSCULAR; INTRAVENOUS; SUBCUTANEOUS at 08:14

## 2017-06-29 RX ADMIN — WARFARIN SODIUM 5 MG: 5 TABLET ORAL at 18:09

## 2017-06-29 RX ADMIN — ACETAMINOPHEN 650 MG: 325 TABLET, FILM COATED ORAL at 08:26

## 2017-06-29 RX ADMIN — ACETAMINOPHEN 650 MG: 325 TABLET, FILM COATED ORAL at 15:03

## 2017-06-29 RX ADMIN — IPRATROPIUM BROMIDE AND ALBUTEROL SULFATE 3 ML: .5; 3 SOLUTION RESPIRATORY (INHALATION) at 13:00

## 2017-06-29 RX ADMIN — POTASSIUM CHLORIDE 20 MEQ: 750 TABLET, EXTENDED RELEASE ORAL at 19:30

## 2017-06-29 RX ADMIN — CITALOPRAM HYDROBROMIDE 20 MG: 20 TABLET ORAL at 08:26

## 2017-06-29 RX ADMIN — Medication 500 MCG: at 12:11

## 2017-06-29 RX ADMIN — METFORMIN HYDROCHLORIDE 500 MG: 500 TABLET ORAL at 08:26

## 2017-06-29 ASSESSMENT — PAIN DESCRIPTION - DESCRIPTORS
DESCRIPTORS: DISCOMFORT;SORE
DESCRIPTORS: ACHING;CONSTANT;STABBING
DESCRIPTORS: SORE;ACHING
DESCRIPTORS: DISCOMFORT;SORE
DESCRIPTORS: ACHING;DISCOMFORT;SORE

## 2017-06-29 NOTE — PROGRESS NOTES
CVTS Daily Note  6/29/2017  Attending: Ronnie Quigley MD    S:   Overnight events- had increase in flow last evening while sitting in chair, saw +++. No power spikes that team is aware of.   Used IV dilaudid twice overnight due to pain, discussed that he has tramadol for that purpose to use next time, he had forgotten about his tramadol.   Encouraged patient to get to bathroom and not use commode.     Pt seen at bedside resting comfortably.    Does complain of fatigue with activity but not SOB, otherwise no acute complaints.      Denies F/C/Sweats.  No CP, SOB, or calf pain.    Tolerating diet but food doesn't taste very good.  + BM.  + Flatus.    Ambulated well with assistance.    Pain controlled well.    O:   Vitals:    06/29/17 0343 06/29/17 0608 06/29/17 0800 06/29/17 0834   BP:   (!) 81/67    BP Location:   Right arm    Pulse:       Resp: 18  18    Temp:   98.7  F (37.1  C)    TempSrc:   Oral    SpO2:   97% 96%   Weight:  114.5 kg (252 lb 8 oz)     Height:         Vitals:    06/27/17 0500 06/28/17 0620 06/29/17 0608   Weight: 115.7 kg (255 lb) 114.9 kg (253 lb 3.2 oz) 114.5 kg (252 lb 8 oz)     + 0 kg since admit      Intake/Output Summary (Last 24 hours) at 06/29/17 0936  Last data filed at 06/29/17 0609   Gross per 24 hour   Intake              910 ml   Output              925 ml   Net              -15 ml       MAPs: 71 - 92  Gen: AAO x 3, pleasant, NAD  CV: lvad humming, RRR, S1S2 normal, no murmurs, rubs, or gallops.   Pulm: CTA, no rhonchi or wheezes  Abd: obese, soft, non-tender, no guarding  Ext: trace peripheral edema, 1 + pitting  Incision: clean, dry, intact, no erythema  Chest Tube sites: dressings clean and dry  Lines: driveline dressing clean and dry   LVAD: speed 9200, flow 5.7 - 6.4, PI 4.6 - 5.6, power 5.8 - 7.       Labs:  BMP    Recent Labs  Lab 06/29/17  0646 06/28/17  0727 06/27/17  0817 06/26/17  0606    133 134 135   POTASSIUM 4.7 4.5 4.3 4.0   CHLORIDE 99 100 101 103   QAMAR 8.9 8.8  8.9 9.0   CO2 25 26 25 24   BUN 26 24 28 28   CR 1.62* 1.44* 1.39* 1.47*   GLC 96 129* 138* 118*     CBC    Recent Labs  Lab 06/28/17  0727 06/27/17  0817 06/26/17  0606 06/25/17  0826   WBC 9.3 9.4 9.4 9.6   RBC 3.12* 3.12* 2.90* 2.83*   HGB 8.9* 8.9* 8.3* 8.1*   HCT 28.4* 28.3* 26.2* 25.8*   MCV 91 91 90 91   MCH 28.5 28.5 28.6 28.6   MCHC 31.3* 31.4* 31.7 31.4*   RDW 14.4 14.3 14.0 13.8    268 218 190     INR    Recent Labs  Lab 06/29/17  0646 06/28/17  0727 06/27/17  0817 06/26/17  0606   INR 2.74* 3.03* 3.29* 2.83*      Hepatic Panel   Lab Results   Component Value Date    AST 23 06/15/2017     Lab Results   Component Value Date    ALT 26 06/15/2017     Lab Results   Component Value Date    ALBUMIN 3.8 06/15/2017     GLUCOSE:     Recent Labs  Lab 06/29/17  0646 06/28/17  2154 06/28/17  1214 06/28/17  0727 06/28/17  0218 06/27/17  2127 06/27/17  1800 06/27/17  1352 06/27/17  0817  06/26/17  0606  06/25/17  0826  06/24/17  0736   GLC 96  --   --  129*  --   --   --   --  138*  --  118*  --  116*  --  113*   BGM  --  107* 117*  --  110* 144* 123* 154*  --   < >  --   < >  --   < >  --    < > = values in this interval not displayed.      Imaging:    CXR 6/29-   Small stable left pleural effusion.       A/P:   Jim Willingham is a 62 year old male with NICM (EF 20%) s/p Bi-V ICD, severe MR, VT, paroxsymal afib, DM, HTN, CKD, CECILIA, who is s/p HeartMate II left ventricular assist device placement 6/19 with Dr Quigley. IABP removed POD #1.       Neuro:   - Evaristo APAP, prn dilaudidq 3, prn IV dilaudid, prn flexeril   - PTA meds; Celexa, Trazodone      CV:   - HM II lvad as DT, stage D NYHA Class IIIB  - ASA 81 mg  - Nonsustained SVT/A-fib. Replace Lytes PRN. Metoprolol 25 mg BID per Cards II.   - LDH pending 6/29.       Pulm:   - Extubated POD #2.   - IS, deep breathe, Nebs q 6 hrs  - Pleurals out 6/26, CXR without acute changes.       ID:   - Completed jason-op ABx. Afebrile. WBC wnl.       GI / FEN:    - Hx gastric  bypass, B12 supplement.   - Reg diet, bowel regimen.   - Recommended to patient to remain soft/liquid diet until consistent + BM  - Mag Citrate and suppository 6/27 successful, + BM 6/27 x 2, x 1 6/28.       Renal / :   - CKD stage III. Creat 1.62 slight bump, decreased metformin 6/29.   - Bumex 1 mg PO BID starting 6/27 (avoiding IV meds), good UOP   - Extra Bumex 1 mg PO 6/29 at noon.       Heme:   - Hgb 8's. Plts > 200      Endo:   - Stop Sliding scale insulin  - Reduced to metformin 250 mg BID and watching creatinine.   - Holding allopurinol for elevated Creatinine      PPX:   - Protonix      Anticoagulation:   - Coumadin for LVAD, goal 2-3.  INR therapeutic 2.74.        Dispo:   - 6C  - Needs LVAD test review   - Planning DC to TCU for strength recovery and assistance      Staff surgeons have been informed of changes through both  verbal and written communication.      Nash Amado PA-C  Cardiothoracic Surgery  Pager [phone]    9:36 AM   June 29, 2017

## 2017-06-29 NOTE — PLAN OF CARE
Problem: Goal Outcome Summary  Goal: Goal Outcome Summary  Pt alert and oriented.  VSS on room air, CPAP at bedtime.  V-paced, underlying SR , PVC 0-10.  Heartmate 2 LVAD 6/19/17, numbers WNL, no alarms notes this shift.  Incisional pain controlled w/ PO dilaudid/scheduled tylenol.  LVAD teaching tomorrow at 1300.  Continue w/ plan of care and notify team w/ changes/concerns.

## 2017-06-29 NOTE — PLAN OF CARE
Problem: Goal Outcome Summary  Goal: Goal Outcome Summary  PT 6C: Pt completes sit<>stand transfers with CGA and fww. Maintains sternal precautions without cues. Ambulating up to 40-50 ft bouts with CGA, fww, and close w/c follow. Pt reporting onset of dizziness after first bout of ambulation to doorway, but resolves with rest break. HR 86 bpm and SpO2 93% on RA.     Recommend discharge to ARU once medically appropriate to progress strength and functional endurance prior to return home.

## 2017-06-29 NOTE — PROGRESS NOTES
"[facility]-Cover Note  6/28/2017     Was paged regarding raised pump flow  The patient has no SOB, no CP, is very comfortably sitting up,  No other complains     BP 92/72 (BP Location: Left arm)  Pulse 105  Temp 98.7  F (37.1  C) (Oral)  Resp 18  Ht 1.727 m (5' 8\")  Wt 114.9 kg (253 lb 3.2 oz)  SpO2 93%  BMI 38.5 kg/m2    I/O last 3 completed shifts:  In: 800 [P.O.:800]  Out: 1075 [Urine:1075]    Exam:  Alert, comfortable  JVP not raised  Heart: LVAD continuous flow on auscultation  Chest: CTBA    Parameters of LVAD on examination:   Pump Flow: 5.4 pm, Pump Speed: 9200, PI: 4.9, Pump Power: 5.7    Assessment/Plan:  Temporary increase in pump flow due to ? Exercise, not sustained  - to continue with current management  - Please inform if there is sustained increase in pump flow again    Jovanny Rivera MD  PGY-1 General Surgery  Pager # [phone]    "

## 2017-06-29 NOTE — PLAN OF CARE
Problem: Goal Outcome Summary  Goal: Goal Outcome Summary  Outcome: No Change  Pt s/p HM2 placement 6/19. Pt A/O. VSS. Paced rhythm. RA, wears CPAP at night. LVAD #s WNL, no spikes in #s or other events noted. Incisional pain managed with PRN Dilaudid and scheduled Tylenol. Pt encouraged to take PO pain meds when necessary to wean off of IV pain meds. Pt reported feeling dizzy/weak while walking with therapy this AM; LVAD #s WNL when checked by writer. Up to bathroom for BM. LVAD education class this afternoon with pt and pt spouse, needing LVAD test review. Continue with plan of care and report changes to treatment team. Anticipate d/c later this week to TCU.

## 2017-06-29 NOTE — PROGRESS NOTES
Cardiology Progress Note     Events and interval changes in past 24 hours:   Denies dyspnea, orthoponea, palpitations, presyncope or chest pain  9 beats NSVT last evening,     Per RN had high flow 10 high PI 8 overnight    OBJECTIVE FINDINGS:  Temp: 98.7  F (37.1  C) Temp  Min: 98.2  F (36.8  C)  Max: 99.4  F (37.4  C)  Resp: 18 Resp  Min: 16  Max: 18  SpO2: 93 % SpO2  Min: 93 %  Max: 100 % ra Bipap at night    No Data Recorded  Heart Rate: 86 Heart Rate  Min: 83  Max: 114  BP: 92/72 Systolic (24hrs), Av , Min:79 , Max:104     MAPs 80-90    HM2 VAD flow 5 PI 4.5-5 speed 9200 (increased from 9000 on ) power 5    Gen: alert  Resp: clear to auscultation bilaterally, no crackles or wheezing   CV: VAD hum, JVP 6cm, 1-2mm peripheral edema  Abd:soft, nontender,     0.8/1 UO yest  net -0.2  today /0.15 urine  net even  -1.1 net for admission    Wt after  lbs    Vitals:    17 0501 17 2306 17 0500 17 0620   Weight: 114 kg (251 lb 4.8 oz) 115.2 kg (253 lb 14.4 oz) 115.7 kg (255 lb) 114.9 kg (253 lb 3.2 oz)    17 0608   Weight: 114.5 kg (252 lb 8 oz)     Inhaled NO    Warfarin Therapy Reminder       Reason beta blocker order not selected       dextrose 5% and 0.45% NaCl 30 mL/hr at 17 0800       metFORMIN  500 mg Oral BID w/meals     potassium chloride  20 mEq Oral BID     bumetanide  1 mg Oral BID     ipratropium - albuterol 0.5 mg/2.5 mg/3 mL  3 mL Nebulization 4x daily     metoprolol  25 mg Oral BID     polyethylene glycol  17 g Oral BID     bisacodyl  10 mg Rectal Daily     acetaminophen  650 mg Oral Q6H     citalopram  20 mg Oral Daily     cyanocobalamin  500 mcg Sublingual Q48H     montelukast  10 mg Oral At Bedtime     fluticasone-vilanterol  1 puff Inhalation Daily     pantoprazole  40 mg Oral QAM     aspirin  81 mg Oral or Feeding Tube Daily     traZODone  50 mg Oral At Bedtime     sodium chloride (PF)  3 mL Intracatheter Q8H     senna-docusate  1-2 tablet Oral BID      pneumococcal vaccine  0.5 mL Intramuscular Prior to discharge   ipratropium - albuterol 0.5 mg/2.5 mg/3 mL, umeclidinium, traMADol, HYDROmorphone, cyclobenzaprine, HYDROmorphone, albuterol, glucose **OR** dextrose **OR** glucagon, Warfarin Therapy Reminder, naloxone, lidocaine, lidocaine 4%, sodium chloride (PF), Reason beta blocker order not selected, potassium chloride, potassium chloride, potassium chloride, potassium chloride with lidocaine, potassium chloride, magnesium sulfate, magnesium sulfate, potassium phosphate (KPHOS) in D5W IV, potassium phosphate (KPHOS) in D5W IV, potassium phosphate (KPHOS) in D5W IV, potassium phosphate (KPHOS) in D5W IV, potassium phosphate (KPHOS) in D5W IV, ondansetron **OR** ondansetron    Albumin 3.5  CMP    Recent Labs  Lab 06/29/17  0646 06/28/17  0727 06/27/17  0817 06/26/17  0606    133 134 135   POTASSIUM 4.7 4.5 4.3 4.0   CHLORIDE 99 100 101 103   CO2 25 26 25 24   ANIONGAP 9 6 8 8   GLC 96 129* 138* 118*   BUN 26 24 28 28   CR 1.62* 1.44* 1.39* 1.47*   GFRESTIMATED 44* 50* 52* 49*   GFRESTBLACK 53* 61 63 59*   QAMAR 8.9 8.8 8.9 9.0   MAG 2.4* 2.7* 2.0 2.1   PHOS 4.0 3.6 3.7 3.2     CBC    Recent Labs  Lab 06/28/17  0727 06/27/17  0817 06/26/17  0606 06/25/17  0826   WBC 9.3 9.4 9.4 9.6   RBC 3.12* 3.12* 2.90* 2.83*   HGB 8.9* 8.9* 8.3* 8.1*   HCT 28.4* 28.3* 26.2* 25.8*   MCV 91 91 90 91   MCH 28.5 28.5 28.6 28.6   MCHC 31.3* 31.4* 31.7 31.4*   RDW 14.4 14.3 14.0 13.8    268 218 190     INR    Recent Labs  Lab 06/29/17  0646 06/28/17  0727 06/27/17  0817 06/26/17  0606   INR 2.74* 3.03* 3.29* 2.83*     Arterial Blood Gas    Recent Labs  Lab 06/23/17  0725   O2PER 3L     Imaging and other studies:  EKG: Vpaced rhythm    Echocardiogram: before LVAD 6/12  Severe left ventricular dilation is present. LVIDd 8.0 cm.  Severe diffuse hypokinesis is present. Posterior wall akinesis is present.  The Ejection Fraction is estimated at 20-25%.Traced Biplane LVEF is  23%.  Moderate-Severe functional Mitral Regurgitation. There is tenting of the mitral leaflets with restriction of the posterior mitral leaflet. ERO is 0.3  cm^2. RV 43 ml.  Mild right ventricular dilation is present. Global right ventricular function is normal.  Dilation of the inferior vena cava is present with normal respiratory variation in diameter.    Echo after LVAD 6/23  Interpretation Summary  HM 2 at 9000 RPM.     LVAD cannula was seen in the expected anatomic position in the LV apex. LV end  diastolic diameter is 5.4cm. The Ejection Fraction is estimated at <20%.  The aortic valve opens intermittently.  Septum normal.  The inferior vena cava was normal in size with preserved respiratory  Variability.  Mild RV dysfunction      Device settings DDDR  VT/VF ATP and defib, 188  monitor    ASSESSMENT:    62M h/o NICM (EF 20%) s/p Bi-V ICD, severe MR, VT, paroxsymal afib, DM, HTN, CKD, CECILIA, MDD, admitted for gradual shortness of breath, likely 2/2 to inadequate maintenance diuretics. Improving with BiPAP.     # NICM s/p CRT-D s/p HM2 LVAD as DT 6/20, s/p [id number]  in May, extubated 6/21  # Mild RV function after LVAD, normal RV function after LVAD  # Severe functional MR-resolved after VAD  # SVT 6/23-6/24  # NSVT 6/25    # WALTER-unclear etiology-likely hypoperfusion from surgery-improving    # Gout-left elbow    # hx of VT  # pAF  #HTN  #CKD-basline Cr 1.4  #CECILIA    Recommendations  -monitor Cr  -VAD #s overnight not worrisome. Expected in immediate post-op setting  - Diuresis-Bumex 1mg BID. Hypervolemic based on exam  - Metoprolol 25 BID for NSVT/SVT  - Goal MAP<90.   - aspirin 81, warfarin INR therapuetic  -optimize LVAD speed: increased to 9000 on 6/20    Will staff with Dr. Ulisse Gómez  General cards fellow, PGY4  Pager [phone]

## 2017-06-29 NOTE — PROGRESS NOTES
"Surgery Cross-Cover Note  6/28/2017     Was paged regarding raised HR.   Noted previous history of afib  No CP but complains of palpitations  No dizziness    BP 92/72 (BP Location: Left arm)  Pulse 105  Temp 98.7  F (37.1  C) (Oral)  Resp 18  Ht 1.727 m (5' 8\")  Wt 114.9 kg (253 lb 3.2 oz)  SpO2 93%  BMI 38.5 kg/m2    I/O last 3 completed shifts:  In: 800 [P.O.:800]  Out: 1075 [Urine:1075]    Exam:  Alert, comfortable  JVP not elevated  Heart: S1+ S2, regularly irregular rhythm    ECG: afib with rapid ventricular rate    Consulted Cards Fellow    Assessment/Plan:  Stable asymptomatic afib  - to give PO metoprolol 25 mg   - start PO metoprolol at 25 mg BID  - if rate still not controlled, can give IV 2.5 mg metoprolol      Jovanny Rivera MD  PGY-1 General Surgery  Pager # [phone]    "

## 2017-06-29 NOTE — PROGRESS NOTES
All VAD education is complete.   Both patient and wife (Cate) verbalized understanding of and/or correctly demonstrated the ability to:   -Switch power sources  -Change controller. They both demonstrate controller change properly and verbalized understanding that patient should only change controller after discussion with VAD Coordinator and in the presence of a trained caregiver whenever possible.   -Identify controller, AC/DC power sources, and Battery charger function, alarms, and alarms management.   -Perform Self test on controller (should be done with pt connected to PM, not when on batteries).  -State VAD precautions and warnings (including but not limited to: travel bags within arms reach at all time, using AC power while sleeping, avoid total immersion in water, boating, MRIs, metal detectors, contact sports, vacuuming or activity that might create static electricity).   -Identify an emergency and state the correct action in the event of an emergency.   -Recognize situations that require paging VAD coordinator and demonstrate proper paging technique.   -Determine procedures that necessitate prophylactic antibiotics.   -Warfarin is managed by [facility] anticoagulation clinic. Pt understands that (s)he should never stop or change coumadin dose unless directed to do so by either VAD team or [facility] anticoagulation.  -Verbalized understanding of VAD parameters, normal limits, and actions required when outside of range.    -Cate demonstrated removing the old dressing, cleaning the exit site, and applying new dressing using sterile technique. Understands need for DL immobilization and signs/symptoms of infection.  -Both patient and Cate passed written test.-   -Patient confirms having working  3-pronged grounded outlets at home.  -Critical life-sustaining medical equipment letter faxed to [employer].  -VAD information packet faxed to pt's identified EMS service, primary care provider, and local cardiologist (if  applicable).  -Referral sent to [facility] medication monitoring clinic for warfaring management and dosing. Pt will have labs drawn at [facility] as outpatient  -Driveline exit site supplies will be ordered from [facility]  Follow-up appointments scheduled on 8/21/17* with CVTS and on 7/25/17* with Dr. [facility]*

## 2017-06-29 NOTE — PLAN OF CARE
Problem: Goal Outcome Summary  Goal: Goal Outcome Summary  Outcome: No Change  D: S/p LVAD HM II 6/19  I/A: A&Ox4. VSS on RA/Cpap overnight. V-paced 80s. LVAD #s wnl, no alarms noted (High flows noticed ranging 5s-11s+++, Surgery Cross-cover notified, see provider notification note). C/o incisional pain partially relieved by scheduled tylenol and PRN dilaudid. Adequate uop. Up with 1-assist. Slept in chair overnight. Appeared to sleep well.  P: LVAD education at 1300 today. Continue to monitor and notify CVTS with questions/concerns.

## 2017-06-29 NOTE — PROVIDER NOTIFICATION
LVAD flows 5s-11s+++ noted 6/28 at 2330. Pt. Asymptomatic. VSS. LVAD #s wnl otherwise and no alarms noted. Surgery Cross-cover notified and MD here to assess pt. Continue to monitor.

## 2017-06-29 NOTE — PLAN OF CARE
Problem: Goal Outcome Summary  Goal: Goal Outcome Summary  OT/6C: Pt educated on LB dressing techniques, completed with VCs. Educated on TCU vs ARU recs. Pt limited by post op precautions and activity tolerance.     REC: TCU due to decreased ADL I, mobility, transfers, and post-op precautions. At this time feel pt will not have enough needs for ARU as pt most limited by post op precautions

## 2017-06-30 ENCOUNTER — HOSPITAL ENCOUNTER (INPATIENT)
Facility: CLINIC | Age: 62
LOS: 7 days | Discharge: HOME HEALTH | DRG: 949 | End: 2017-07-07
Attending: PHYSICAL MEDICINE & REHABILITATION | Admitting: PHYSICAL MEDICINE & REHABILITATION
Payer: COMMERCIAL

## 2017-06-30 ENCOUNTER — CARE COORDINATION (OUTPATIENT)
Dept: CARE COORDINATION | Facility: CLINIC | Age: 62
End: 2017-06-30

## 2017-06-30 ENCOUNTER — APPOINTMENT (OUTPATIENT)
Dept: OCCUPATIONAL THERAPY | Facility: CLINIC | Age: 62
DRG: 001 | End: 2017-06-30
Attending: PHYSICIAN ASSISTANT
Payer: COMMERCIAL

## 2017-06-30 VITALS
SYSTOLIC BLOOD PRESSURE: 88 MMHG | HEIGHT: 68 IN | WEIGHT: 250.2 LBS | RESPIRATION RATE: 16 BRPM | DIASTOLIC BLOOD PRESSURE: 74 MMHG | BODY MASS INDEX: 37.92 KG/M2 | OXYGEN SATURATION: 96 % | TEMPERATURE: 98.8 F | HEART RATE: 105 BPM

## 2017-06-30 DIAGNOSIS — E08.22 DIABETES MELLITUS DUE TO UNDERLYING CONDITION WITH CHRONIC KIDNEY DISEASE, WITHOUT LONG-TERM CURRENT USE OF INSULIN, UNSPECIFIED CKD STAGE (H): ICD-10-CM

## 2017-06-30 DIAGNOSIS — R53.81 PHYSICAL DECONDITIONING: ICD-10-CM

## 2017-06-30 DIAGNOSIS — G89.18 ACUTE POST-OPERATIVE PAIN: ICD-10-CM

## 2017-06-30 DIAGNOSIS — Z95.811 LVAD (LEFT VENTRICULAR ASSIST DEVICE) PRESENT (H): Primary | ICD-10-CM

## 2017-06-30 LAB
ANION GAP SERPL CALCULATED.3IONS-SCNC: 8 MMOL/L (ref 3–14)
APTT PPP: 102 SEC (ref 22–37)
BUN SERPL-MCNC: 23 MG/DL (ref 7–30)
CALCIUM SERPL-MCNC: 8.8 MG/DL (ref 8.5–10.1)
CHLORIDE SERPL-SCNC: 100 MMOL/L (ref 94–109)
CO2 SERPL-SCNC: 26 MMOL/L (ref 20–32)
CREAT SERPL-MCNC: 1.57 MG/DL (ref 0.66–1.25)
GFR SERPL CREATININE-BSD FRML MDRD: 45 ML/MIN/1.7M2
GLUCOSE BLDC GLUCOMTR-MCNC: 83 MG/DL (ref 70–99)
GLUCOSE BLDC GLUCOMTR-MCNC: 97 MG/DL (ref 70–99)
GLUCOSE SERPL-MCNC: 83 MG/DL (ref 70–99)
INR PPP: 2.93 (ref 0.86–1.14)
MAGNESIUM SERPL-MCNC: 2.1 MG/DL (ref 1.6–2.3)
PHOSPHATE SERPL-MCNC: 4.6 MG/DL (ref 2.5–4.5)
POTASSIUM SERPL-SCNC: 4.5 MMOL/L (ref 3.4–5.3)
SODIUM SERPL-SCNC: 135 MMOL/L (ref 133–144)

## 2017-06-30 PROCEDURE — 40000133 ZZH STATISTIC OT WARD VISIT: Performed by: OCCUPATIONAL THERAPIST

## 2017-06-30 PROCEDURE — 27210995 ZZH RX 272

## 2017-06-30 PROCEDURE — 94640 AIRWAY INHALATION TREATMENT: CPT | Mod: 76

## 2017-06-30 PROCEDURE — 25000132 ZZH RX MED GY IP 250 OP 250 PS 637: Performed by: PHYSICAL MEDICINE & REHABILITATION

## 2017-06-30 PROCEDURE — 25000125 ZZHC RX 250: Performed by: HOSPITALIST

## 2017-06-30 PROCEDURE — 80048 BASIC METABOLIC PNL TOTAL CA: CPT | Performed by: SURGERY

## 2017-06-30 PROCEDURE — 40000275 ZZH STATISTIC RCP TIME EA 10 MIN

## 2017-06-30 PROCEDURE — 97535 SELF CARE MNGMENT TRAINING: CPT | Mod: GO | Performed by: OCCUPATIONAL THERAPIST

## 2017-06-30 PROCEDURE — 99222 1ST HOSP IP/OBS MODERATE 55: CPT | Performed by: HOSPITALIST

## 2017-06-30 PROCEDURE — 94640 AIRWAY INHALATION TREATMENT: CPT | Performed by: OPTOMETRIST

## 2017-06-30 PROCEDURE — 85610 PROTHROMBIN TIME: CPT | Performed by: SURGERY

## 2017-06-30 PROCEDURE — 25000132 ZZH RX MED GY IP 250 OP 250 PS 637: Performed by: SURGERY

## 2017-06-30 PROCEDURE — 00000146 ZZHCL STATISTIC GLUCOSE BY METER IP

## 2017-06-30 PROCEDURE — 99207 ZZC CONSULT E&M CHANGED TO INITIAL LEVEL: CPT | Performed by: HOSPITALIST

## 2017-06-30 PROCEDURE — 36415 COLL VENOUS BLD VENIPUNCTURE: CPT | Performed by: SURGERY

## 2017-06-30 PROCEDURE — 25000132 ZZH RX MED GY IP 250 OP 250 PS 637: Performed by: PHYSICIAN ASSISTANT

## 2017-06-30 PROCEDURE — 40000275 ZZH STATISTIC RCP TIME EA 10 MIN: Performed by: OPTOMETRIST

## 2017-06-30 PROCEDURE — 25000125 ZZHC RX 250: Performed by: SURGERY

## 2017-06-30 PROCEDURE — 12800006 ZZH R&B REHAB

## 2017-06-30 PROCEDURE — 83735 ASSAY OF MAGNESIUM: CPT | Performed by: SURGERY

## 2017-06-30 PROCEDURE — 25000132 ZZH RX MED GY IP 250 OP 250 PS 637: Performed by: STUDENT IN AN ORGANIZED HEALTH CARE EDUCATION/TRAINING PROGRAM

## 2017-06-30 PROCEDURE — 25000132 ZZH RX MED GY IP 250 OP 250 PS 637: Performed by: HOSPITALIST

## 2017-06-30 PROCEDURE — 85730 THROMBOPLASTIN TIME PARTIAL: CPT | Performed by: SURGERY

## 2017-06-30 PROCEDURE — 84100 ASSAY OF PHOSPHORUS: CPT | Performed by: SURGERY

## 2017-06-30 PROCEDURE — 94640 AIRWAY INHALATION TREATMENT: CPT

## 2017-06-30 RX ORDER — CITALOPRAM HYDROBROMIDE 10 MG/1
20 TABLET ORAL DAILY
Status: DISCONTINUED | OUTPATIENT
Start: 2017-07-01 | End: 2017-07-07 | Stop reason: HOSPADM

## 2017-06-30 RX ORDER — NALOXONE HYDROCHLORIDE 0.4 MG/ML
.1-.4 INJECTION, SOLUTION INTRAMUSCULAR; INTRAVENOUS; SUBCUTANEOUS
Status: DISCONTINUED | OUTPATIENT
Start: 2017-06-30 | End: 2017-07-07 | Stop reason: HOSPADM

## 2017-06-30 RX ORDER — ZINC SULFATE 50(220)MG
220 CAPSULE ORAL 2 TIMES DAILY
Status: DISCONTINUED | OUTPATIENT
Start: 2017-06-30 | End: 2017-07-07 | Stop reason: HOSPADM

## 2017-06-30 RX ORDER — DEXTROSE MONOHYDRATE 25 G/50ML
25-50 INJECTION, SOLUTION INTRAVENOUS
Status: DISCONTINUED | OUTPATIENT
Start: 2017-06-30 | End: 2017-07-07 | Stop reason: HOSPADM

## 2017-06-30 RX ORDER — METOPROLOL TARTRATE 25 MG/1
25 TABLET, FILM COATED ORAL 2 TIMES DAILY
Status: DISCONTINUED | OUTPATIENT
Start: 2017-06-30 | End: 2017-07-07 | Stop reason: HOSPADM

## 2017-06-30 RX ORDER — PANTOPRAZOLE SODIUM 40 MG/1
40 TABLET, DELAYED RELEASE ORAL EVERY MORNING
Qty: 30 TABLET | Status: ON HOLD | DISCHARGE
Start: 2017-06-30 | End: 2017-07-06

## 2017-06-30 RX ORDER — AMOXICILLIN 250 MG
2 CAPSULE ORAL 2 TIMES DAILY
Qty: 100 TABLET | Status: ON HOLD | DISCHARGE
Start: 2017-06-30 | End: 2017-07-06

## 2017-06-30 RX ORDER — ASPIRIN 81 MG/1
81 TABLET, CHEWABLE ORAL DAILY
Qty: 36 TABLET | Status: ON HOLD | DISCHARGE
Start: 2017-06-30 | End: 2017-07-06

## 2017-06-30 RX ORDER — BUMETANIDE 1 MG/1
1 TABLET ORAL DAILY
Qty: 30 TABLET | Status: ON HOLD | DISCHARGE
Start: 2017-06-30 | End: 2017-07-06

## 2017-06-30 RX ORDER — ALBUTEROL SULFATE 90 UG/1
2 AEROSOL, METERED RESPIRATORY (INHALATION) EVERY 4 HOURS PRN
Status: DISCONTINUED | OUTPATIENT
Start: 2017-06-30 | End: 2017-07-07 | Stop reason: HOSPADM

## 2017-06-30 RX ORDER — HYDROMORPHONE HYDROCHLORIDE 2 MG/1
2-4 TABLET ORAL
Refills: 0 | Status: ON HOLD | DISCHARGE
Start: 2017-06-30 | End: 2017-07-06

## 2017-06-30 RX ORDER — BUMETANIDE 2 MG/1
2 TABLET ORAL DAILY
DISCHARGE
Start: 2017-07-01 | End: 2017-07-25

## 2017-06-30 RX ORDER — AMOXICILLIN 250 MG
2 CAPSULE ORAL 2 TIMES DAILY
Status: DISCONTINUED | OUTPATIENT
Start: 2017-06-30 | End: 2017-07-07 | Stop reason: HOSPADM

## 2017-06-30 RX ORDER — TRAMADOL HYDROCHLORIDE 50 MG/1
100 TABLET ORAL EVERY 6 HOURS PRN
Status: DISCONTINUED | OUTPATIENT
Start: 2017-06-30 | End: 2017-07-07 | Stop reason: HOSPADM

## 2017-06-30 RX ORDER — METOPROLOL TARTRATE 25 MG/1
25 TABLET, FILM COATED ORAL 2 TIMES DAILY
Qty: 60 TABLET | DISCHARGE
Start: 2017-06-30 | End: 2017-07-12

## 2017-06-30 RX ORDER — BUMETANIDE 2 MG/1
2 TABLET ORAL DAILY
Status: DISCONTINUED | OUTPATIENT
Start: 2017-07-01 | End: 2017-06-30 | Stop reason: HOSPADM

## 2017-06-30 RX ORDER — ASPIRIN 81 MG/1
81 TABLET, CHEWABLE ORAL DAILY
Status: DISCONTINUED | OUTPATIENT
Start: 2017-07-01 | End: 2017-07-07 | Stop reason: HOSPADM

## 2017-06-30 RX ORDER — NICOTINE POLACRILEX 4 MG
15-30 LOZENGE BUCCAL
Status: DISCONTINUED | OUTPATIENT
Start: 2017-06-30 | End: 2017-07-07 | Stop reason: HOSPADM

## 2017-06-30 RX ORDER — POLYETHYLENE GLYCOL 3350 17 G/17G
17 POWDER, FOR SOLUTION ORAL DAILY PRN
Status: DISCONTINUED | OUTPATIENT
Start: 2017-06-30 | End: 2017-07-07 | Stop reason: HOSPADM

## 2017-06-30 RX ORDER — ACETAMINOPHEN 325 MG/1
650 TABLET ORAL EVERY 6 HOURS
Qty: 100 TABLET | DISCHARGE
Start: 2017-06-30 | End: 2020-01-16

## 2017-06-30 RX ORDER — TRAMADOL HYDROCHLORIDE 50 MG/1
100 TABLET ORAL EVERY 6 HOURS PRN
Qty: 28 TABLET | Status: SHIPPED | DISCHARGE
Start: 2017-06-30 | End: 2021-01-26

## 2017-06-30 RX ORDER — POTASSIUM CHLORIDE 1500 MG/1
20 TABLET, EXTENDED RELEASE ORAL 2 TIMES DAILY
Qty: 90 TABLET | Status: ON HOLD | DISCHARGE
Start: 2017-06-30 | End: 2017-07-06

## 2017-06-30 RX ORDER — POTASSIUM CHLORIDE 750 MG/1
20 TABLET, EXTENDED RELEASE ORAL 2 TIMES DAILY
Status: DISCONTINUED | OUTPATIENT
Start: 2017-06-30 | End: 2017-07-07 | Stop reason: HOSPADM

## 2017-06-30 RX ORDER — HYDROMORPHONE HYDROCHLORIDE 2 MG/1
2-4 TABLET ORAL
Status: DISCONTINUED | OUTPATIENT
Start: 2017-06-30 | End: 2017-07-06

## 2017-06-30 RX ORDER — BUMETANIDE 1 MG/1
1 TABLET ORAL DAILY
Status: DISCONTINUED | OUTPATIENT
Start: 2017-06-30 | End: 2017-06-30 | Stop reason: HOSPADM

## 2017-06-30 RX ORDER — ACETAMINOPHEN 325 MG/1
650 TABLET ORAL EVERY 6 HOURS
Status: DISCONTINUED | OUTPATIENT
Start: 2017-06-30 | End: 2017-07-07 | Stop reason: HOSPADM

## 2017-06-30 RX ORDER — CYANOCOBALAMIN 1000 UG/ML
1000 INJECTION, SOLUTION INTRAMUSCULAR; SUBCUTANEOUS
Status: DISCONTINUED | OUTPATIENT
Start: 2017-07-01 | End: 2017-07-07 | Stop reason: HOSPADM

## 2017-06-30 RX ORDER — MONTELUKAST SODIUM 10 MG/1
10 TABLET ORAL AT BEDTIME
Status: DISCONTINUED | OUTPATIENT
Start: 2017-06-30 | End: 2017-07-07 | Stop reason: HOSPADM

## 2017-06-30 RX ORDER — BUMETANIDE 1 MG/1
1 TABLET ORAL DAILY
Status: DISCONTINUED | OUTPATIENT
Start: 2017-07-01 | End: 2017-06-30

## 2017-06-30 RX ORDER — PANTOPRAZOLE SODIUM 40 MG/1
40 TABLET, DELAYED RELEASE ORAL EVERY MORNING
Status: DISCONTINUED | OUTPATIENT
Start: 2017-07-01 | End: 2017-07-07 | Stop reason: HOSPADM

## 2017-06-30 RX ORDER — TRAZODONE HYDROCHLORIDE 50 MG/1
50 TABLET, FILM COATED ORAL AT BEDTIME
Qty: 90 TABLET | Status: ON HOLD | DISCHARGE
Start: 2017-06-30 | End: 2017-07-06

## 2017-06-30 RX ORDER — WARFARIN SODIUM 4 MG/1
4 TABLET ORAL
Status: DISCONTINUED | OUTPATIENT
Start: 2017-06-30 | End: 2017-06-30 | Stop reason: HOSPADM

## 2017-06-30 RX ORDER — IPRATROPIUM BROMIDE AND ALBUTEROL SULFATE 2.5; .5 MG/3ML; MG/3ML
3 SOLUTION RESPIRATORY (INHALATION) EVERY 4 HOURS PRN
Status: DISCONTINUED | OUTPATIENT
Start: 2017-06-30 | End: 2017-07-07 | Stop reason: HOSPADM

## 2017-06-30 RX ORDER — POLYETHYLENE GLYCOL 3350 17 G/17G
17 POWDER, FOR SOLUTION ORAL DAILY PRN
Qty: 7 PACKET | DISCHARGE
Start: 2017-06-30 | End: 2018-11-20

## 2017-06-30 RX ORDER — TRAZODONE HYDROCHLORIDE 50 MG/1
50 TABLET, FILM COATED ORAL AT BEDTIME
Status: DISCONTINUED | OUTPATIENT
Start: 2017-06-30 | End: 2017-07-07 | Stop reason: HOSPADM

## 2017-06-30 RX ORDER — WARFARIN SODIUM 2 MG/1
TABLET ORAL
Qty: 30 TABLET | Status: ON HOLD | DISCHARGE
Start: 2017-06-30 | End: 2017-07-06

## 2017-06-30 RX ORDER — BUMETANIDE 1 MG/1
1 TABLET ORAL
Status: DISCONTINUED | OUTPATIENT
Start: 2017-06-30 | End: 2017-07-06

## 2017-06-30 RX ADMIN — HYDROMORPHONE HYDROCHLORIDE 4 MG: 2 TABLET ORAL at 00:41

## 2017-06-30 RX ADMIN — IPRATROPIUM BROMIDE AND ALBUTEROL SULFATE 3 ML: .5; 3 SOLUTION RESPIRATORY (INHALATION) at 11:06

## 2017-06-30 RX ADMIN — Medication 250 MG: at 20:34

## 2017-06-30 RX ADMIN — METOPROLOL TARTRATE 25 MG: 25 TABLET, FILM COATED ORAL at 21:57

## 2017-06-30 RX ADMIN — Medication 250 MG: at 08:30

## 2017-06-30 RX ADMIN — BUMETANIDE 1 MG: 1 TABLET ORAL at 08:17

## 2017-06-30 RX ADMIN — SENNOSIDES AND DOCUSATE SODIUM 2 TABLET: 8.6; 5 TABLET ORAL at 21:57

## 2017-06-30 RX ADMIN — HYDROMORPHONE HYDROCHLORIDE 4 MG: 2 TABLET ORAL at 08:15

## 2017-06-30 RX ADMIN — METOPROLOL TARTRATE 25 MG: 25 TABLET, FILM COATED ORAL at 08:18

## 2017-06-30 RX ADMIN — ACETAMINOPHEN 650 MG: 325 TABLET, FILM COATED ORAL at 17:27

## 2017-06-30 RX ADMIN — ASPIRIN 81 MG CHEWABLE TABLET 81 MG: 81 TABLET CHEWABLE at 08:16

## 2017-06-30 RX ADMIN — IPRATROPIUM BROMIDE AND ALBUTEROL SULFATE 3 ML: .5; 3 SOLUTION RESPIRATORY (INHALATION) at 00:25

## 2017-06-30 RX ADMIN — PANTOPRAZOLE SODIUM 40 MG: 40 TABLET, DELAYED RELEASE ORAL at 08:17

## 2017-06-30 RX ADMIN — ZINC SULFATE CAP 220 MG (50 MG ELEMENTAL ZN) 220 MG: 220 (50 ZN) CAP at 21:57

## 2017-06-30 RX ADMIN — IPRATROPIUM BROMIDE AND ALBUTEROL SULFATE 3 ML: .5; 3 SOLUTION RESPIRATORY (INHALATION) at 08:05

## 2017-06-30 RX ADMIN — HYDROMORPHONE HYDROCHLORIDE 4 MG: 2 TABLET ORAL at 16:56

## 2017-06-30 RX ADMIN — WARFARIN SODIUM 4 MG: 3 TABLET ORAL at 21:02

## 2017-06-30 RX ADMIN — HYDROMORPHONE HYDROCHLORIDE 4 MG: 2 TABLET ORAL at 20:33

## 2017-06-30 RX ADMIN — ACETAMINOPHEN 650 MG: 325 TABLET, FILM COATED ORAL at 21:57

## 2017-06-30 RX ADMIN — BUMETANIDE 1 MG: 1 TABLET ORAL at 20:34

## 2017-06-30 RX ADMIN — IPRATROPIUM BROMIDE AND ALBUTEROL SULFATE 3 ML: .5; 3 SOLUTION RESPIRATORY (INHALATION) at 18:09

## 2017-06-30 RX ADMIN — TRAMADOL HYDROCHLORIDE 50 MG: 50 TABLET, COATED ORAL at 10:46

## 2017-06-30 RX ADMIN — POTASSIUM CHLORIDE 20 MEQ: 750 TABLET, EXTENDED RELEASE ORAL at 08:18

## 2017-06-30 RX ADMIN — MONTELUKAST SODIUM 10 MG: 10 TABLET, FILM COATED ORAL at 21:57

## 2017-06-30 RX ADMIN — WATER: 100 IRRIGANT IRRIGATION at 23:23

## 2017-06-30 RX ADMIN — CITALOPRAM HYDROBROMIDE 20 MG: 20 TABLET ORAL at 08:17

## 2017-06-30 RX ADMIN — ACETAMINOPHEN 650 MG: 325 TABLET, FILM COATED ORAL at 08:16

## 2017-06-30 RX ADMIN — POTASSIUM CHLORIDE 20 MEQ: 750 TABLET, FILM COATED, EXTENDED RELEASE ORAL at 21:57

## 2017-06-30 ASSESSMENT — ACTIVITIES OF DAILY LIVING (ADL)
DRESS: 0-->INDEPENDENT
RETIRED_EATING: 0-->INDEPENDENT
BATHING: 0-->INDEPENDENT
RETIRED_COMMUNICATION: 0-->UNDERSTANDS/COMMUNICATES WITHOUT DIFFICULTY
AMBULATION: 0-->INDEPENDENT
SWALLOWING: 0-->SWALLOWS FOODS/LIQUIDS WITHOUT DIFFICULTY
WHICH_OF_THE_ABOVE_FUNCTIONAL_RISKS_HAD_A_RECENT_ONSET_OR_CHANGE?: AMBULATION;TRANSFERRING;TOILETING;DRESSING;BATHING
NUMBER_OF_TIMES_PATIENT_HAS_FALLEN_WITHIN_LAST_SIX_MONTHS: 1
TOILETING: 0-->INDEPENDENT
COGNITION: 0 - NO COGNITION ISSUES REPORTED
TRANSFERRING: 0-->INDEPENDENT

## 2017-06-30 ASSESSMENT — PAIN DESCRIPTION - DESCRIPTORS: DESCRIPTORS: SORE

## 2017-06-30 NOTE — PLAN OF CARE
Problem: Goal Outcome Summary  Goal: Goal Outcome Summary  Physical Therapy Discharge Summary     Reason for therapy discharge:    Discharged to acute rehabilitation facility.     Progress towards therapy goal(s). See goals on Care Plan in [facility] electronic health record for goal details.  Goals partially met.  Barriers to achieving goals:   discharge from facility.     Therapy recommendation(s):    Continued therapy is recommended.  Rationale/Recommendations:  rehab to progress strengthening and functional activity tolerance.

## 2017-06-30 NOTE — DISCHARGE INSTRUCTIONS
Going home with your VAD    You are about to leave the hospital with your new VAD. This time can feel overwhelming. Your VAD Coordinator team is here to do everything we can to make this transition as smooth as possible. Below are contact numbers and information to help ease the process. A VAD Coordinator is available 24/7 should you have any questions. We would be more than happy to assist you.     Please remember, if you have a true emergency, ALWAYS call 911 first. Then call the on-call VAD Coordinator.     To Reach the On-Call VAD Coordinator: Dial the main hospital number at [phone]. Choose option 4 to speak with the . Ask him or her to page the on-call VAD Coordinator. We should get back to you within five minutes.     Symptoms to Report: Please contact the on-call VAD Coordinator to report:  - Bleeding   - Dizziness/lightheadedness  - Changes in your VAD parameters (+/- 2 from baseline)  - VAD alarms  - Numbness, tingling, or difficulty moving your arms or legs  - Changes in speech, swallowing, or mental status  - ANY head injury, even if it is just a small bump  - Dark, black, tarry, red, or bloody stools  - If you vomit and it is bloody, black, or looks like coffee grounds  - Increased drainage, redness, tenderness, or trauma to your driveline site, chest incision, or chest tube sites    - Questions about your medications  - Questions about your Coumadin or INR  - Any other changes or concerns    Dressing Supplies: Your dressing supply company is [employer]. Please call them once you leave the hospital. They can be reached at [phone]. Verify that they have the correct address for delivery, especially if you are staying in the [place] before going home.     Blood Thinners: Your Coumadin (warfarin) will be managed by the [facility]. They can be reached M-F, 8am-4pm. They will communicate with you regarding your blood draws and your Coumadin  dose. If you have an INR drawn and you don t hear from the clinic by 2pm please call them at [phone].  Please never allow anyone else to adjust your Coumadin or Aspirin without talking to a VAD Coordinator.     Clinic Appointments: The VAD team will schedule you for all of your follow-up visits. If you have any questions please call the main VAD office at [phone] to speak with our . You can also contact your VAD Coordinator.     VAD Coordinator: Your VAD Coordinator, Rajani Abdi, can be reached M-F, 8am-4pm, at [phone] or [email or web address].    If you have any further questions or concerns about your VAD please refer to the discharge folder you received from your VAD Coordinator and/or call the on-call VAD Coordinator.       [facility]      AFTER YOU GO HOME FROM YOUR HEART SURGERY    Avoid lifting anything greater than te pounds for 6 weeks after surgery and then less than 20 pounds for an additional 6 weeks.    No driving until cleared by LVAD team.     Avoid strenuous activities such as bowling, vacuuming, raking, shoveling, golf or tennis for 12 weeks after your surgery. It is okay to resume sex if you feel comfortable in doing so. You may have to try different positions with your partner.     Splint your chest incision by hugging a pillow or bringing your arms across your chest when coughing or sneezing. Avoid pushing off with your arms when getting up for the first month if you have had your sternum opened.    Shower or wash your incisions daily with soap and water (or as instructed), pat dry. Keep wound clean and dry, showers are okay after discharge, but don't let spray hit directly on incision. No baths or swimming for 1 month.  Clean wounds twice a day for 2 weeks with microklenz spray if available. Cover chest tube sites with gauze until they stop draining, then leave open. It is not abnormal for chest tube sites to drain yellowish/clear fluid for up  to 2-3 weeks after surgery.   Watch for signs of infection: increased redness, tenderness, warmth or any drainage that appears infected (pus like) or is persistent.  Also a temperature > 100.5 F or chills. Call your surgeon or primary care provider's office immediately. Remove any skin glue left on incisions after 10 days. This will not affect your incision and can speed up healing.    Exercise is very important in your recovery. Please follow the guidelines set up for you in your cardiac rehab classes at the hospital. If outpatient cardiac rehab was ordered for you, we highly recommend you participate. If you have problems arranging your cardiac rehab, please call [phone].     Avoid sitting for prolonged periods of time, try to walk every hour during the day. If you have a leg incision, elevate your leg often when you are not walking.    Check your weight when you get home from the hospital and continue to check it daily through your recovery for at least a month. If you notice a weight gain of 2-3 pounds in a week, notify your primary care physician, cardiologist or surgeon.    Bowel activity may be slow after surgery. If necessary, you may take an over the counter laxative such as Milk of Magnesia or Miralax. You may have stool softeners prescribed (docusate sodium, Senokot). We recommend using stool softeners while using narcotics for pain (oxycodone/percocet, hydrocodone/vicodin).      DENTAL VISITS AFTER SURGERY  If you have had your heart valve repaired or replaced, we do not recommend having any dental work done for 6 months and you will need to take an antibiotic prior to dental visits from now on.  Please notify your dentist before any procedure for the proper treatment needed. The antibiotic is taken by mouth one hour prior to visit. This includes routine cleanings.    DO NOT SMOKE.  IF YOU NEED HELP QUITTING, PLEASE TALK WITH YOUR CARDIOLOGIST OR PRIMARY DOCTOR.    You are on a blood thinner, follow  the instructions you were given in the hospital and DO NOT SKIP this medication. Try and take it the same time everyday. Your primary care physician or coumadin clinic will manage the dosing.     If you have an LVAD device call your LVAD coordinator.     REGARDING PRESCRIPTION REFILLS.  If you need a refill on your pain medication contact us.  All other medications will be adjusted, discontinued and re-filled by your primary care physician and/or your cardiologist as they were prior to your surgery. We have given you enough for one to three month with possibly one refill.    POST-OPERATIVE CLINIC VISITS  You will then return to the care of your primary physician and your cardiologist heart failure LVAD team.   It is important to call for an appointment to see your cardiologist in 2-3 weeks after discharge and your primary care physician in 2-4 weeks after discharge. If there is a need to return to see CT Surgery please call our  at [phone].  Your LVAD coordinator will assist in making appointments to be seen.     SURGICAL QUESTIONS  Please call Gayla Reyes with any surgical questions, her phone number is listed below.  She can assist you with your needs and contact other surgery care team members as indicated.    For general questions or concerns, please call the Cardiothoracic Surgery Department at [phone] 8-4:30 M-F.   On weekends or after hours, please call [phone] and ask the  to   page the Cardiothoracic Surgery fellow on call.      Thank you,    Your Cardiothoracic Surgery Team  Gayla Reyes RN Care Coordinator-  [phone]   Clara Krueger PA-C

## 2017-06-30 NOTE — H&P
"    [facility]   Acute Rehabilitation Unit  Admission History and Physical    chief complaint   \"get stronger to go home\"    History of Present illness  Jim Willingham is a 62 year old man with a past medical history of CKD stage III, DM type 2, CECILIA, Obesity s/p gastric bypass, HTN, Asthma, COPD, NICM with EF 20% who was admitted 6/15 with progressive sob, ultimately admitted to heart failure team and underwent VAD (Heartmate II) placement 6/19.  Postoperatively admitted to CICU, extubated POD 2.  Post operative course relatively uncomplicated, non sustained arrhthymias,  WALTER, decreased strength and activity tolerance.      On arrival to ARU patient reports he is overall feeling quite well.  Has ongoing incisional pain improved with use of oral dilaudid.  Denies n/v/d, chest pain, fever, concerns with bowel or bladder.  He reports poor appetite though is taking in food and supplements. Reports one episode of dizziness with standing but overall doing well with tolerating increased activity.  Describes generalized weakness and fatigue.  Has asthma as well as COPD and is chronically on scheduled nebs, notes ongoing sob with activity though this is improving.  Denies cough, no current wheeze.  Reports he and wife Cate both passed LVAD test and are feeling confident with LVAD cares.      Functionally, the patient is requiring setup for eating and grooming, mod assist for dressing and bathing, min assist for ambulation and transfer and max assist for stairs.  Noted to have decreased strength, balance, activity tolerance, and safety. Goal for home with independence in mobility, transfers and ADLS, will have 24/7 support of wife upon discharge per LVAD team requirements.       PAST Medical History   Reviewed and updated in Epic.  Past Medical History:   Diagnosis Date     Benign essential hypertension 5/11/2017     Cardiomyopathy, unspecified (H) 5/8/2017     CKD (chronic kidney disease) " stage 3, GFR 30-59 ml/min 5/11/2017     Depression 5/11/2017     Diabetes mellitus (H) 5/11/2017     H/O gastric bypass 5/11/2017     ICD (implantable cardioverter-defibrillator), biventricular, in situ 5/11/2017     NICM (nonischemic cardiomyopathy) (H)/ EF 20% 5/11/2017    ECHO: LVEDd. 7.66 cm, Restrictive pattern , Severe mitral valve regurgitation     CECILIA (obstructive sleep apnea) 5/11/2017     Paroxysmal atrial fibrillation (H) 5/11/2017     Paroxysmal VT (H) 5/11/2017     Vitamin B12 deficiency (non anemic) 5/11/2017       Surgical History  Reviewed and updated in Epic.  Past Surgical History:   Procedure Laterality Date     GI SURGERY  2003    [facility]     INSERT VENTRICULAR ASSIST DEVICE LEFT (HEARTMATE II) N/A 6/19/2017    Procedure: INSERT VENTRICULAR ASSIST DEVICE LEFT (HEARTMATE II);  Median Sternotomy Heartmate II Left Ventricular Assist Device Insertion on Pump Oxygenator;  Surgeon: Ronnie Quigley MD;  Location: [facility] OR     ORTHOPEDIC SURGERY  1994    right knee wired       SOCIAL HISTORY  Reviewed and updated in Epic.  Marital Status:   Living situation: [place] [facility] [place] with wife Cate, three year old adopted great grand daughter, and adult son  Family support: Cate and 6 children to provide support  Vocational History: retired respiratory therapist  Tobacco use: former smoker quit 1994  Alcohol use: very rare use  Illicit drug use: denies  Social History     Social History     Marital status:      Spouse name: N/A     Number of children: N/A     Years of education: N/A     Occupational History     Not on file.     Social History Main Topics     Smoking status: Former Smoker     Quit date: 1995     Smokeless tobacco: Not on file     Alcohol use No     Drug use: Not on file     Sexual activity: Yes     Partners: Female     Other Topics Concern     Parent/Sibling W/ Cabg, Mi Or Angioplasty Before 65f 55m? No     Social History Narrative       FAMILY HISTORY  Reviewed and  updated in Epic.  Family History   Problem Relation Age of Onset     CEREBROVASCULAR DISEASE Mother      DIABETES Mother      Hypertension Mother      Coronary Artery Disease Father      Type 2 Diabetes Father          Prior Functional history   Pt was independent with all ADLs/IADLs, transfers, mobility and gait.  Worsening heart failure over several months, with RESENDIZ and limited activity.     Medications  Scheduled meds  Prescriptions Prior to Admission   Medication Sig Dispense Refill Last Dose     HYDROmorphone (DILAUDID) 2 MG tablet Take 1-2 tablets (2-4 mg) by mouth every 3 hours as needed for moderate to severe pain  0 6/30/2017 at 0815     traMADol (ULTRAM) 50 MG tablet Take 2 tablets (100 mg) by mouth every 6 hours as needed for moderate pain or breakthrough pain 28 tablet  6/30/2017 at 1046     acetaminophen (TYLENOL) 325 MG tablet Take 2 tablets (650 mg) by mouth every 6 hours 100 tablet  6/30/2017 at 0816     aspirin 81 MG chewable tablet 1 tablet (81 mg) by Oral or Feeding Tube route daily 36 tablet  6/30/2017 at 0816     warfarin (COUMADIN) 2 MG tablet Take 4 mg PO daily until next INR check. INR goal 2-3. 30 tablet  6/29/2017 at 1809     traZODone (DESYREL) 50 MG tablet Take 1 tablet (50 mg) by mouth At Bedtime 90 tablet  6/29/2017 at 2108     metFORMIN (GLUCOPHAGE) 500 MG tablet Take 0.5 tablets (250 mg) by mouth 2 times daily (with meals) 60 tablet  6/30/2017 at 0830     metoprolol (LOPRESSOR) 25 MG tablet Take 1 tablet (25 mg) by mouth 2 times daily 60 tablet  6/30/2017 at 0818     bumetanide (BUMEX) 1 MG tablet Take 1 tablet (1 mg) by mouth daily 30 tablet  6/30/2017 at 0817     potassium chloride SA (K-DUR/KLOR-CON M) 20 MEQ CR tablet Take 1 tablet (20 mEq) by mouth 2 times daily 90 tablet  6/30/2017 at 0818     pantoprazole (PROTONIX) 40 MG EC tablet Take 1 tablet (40 mg) by mouth every morning 30 tablet  6/30/2017 at 0817     ipratropium - albuterol 0.5 mg/2.5 mg/3 mL (DUONEB) 0.5-2.5 (3) MG/3ML  "neb solution Take 3 mLs by nebulization every 4 hours as needed for shortness of breath / dyspnea or wheezing   6/30/2017 at 1106     citalopram (CELEXA) 20 MG tablet Take 20 mg by mouth daily   6/30/2017 at 0817     fluticasone-vilanterol (BREO ELLIPTA) 200-25 MCG/INH oral inhaler Inhale 1 puff into the lungs daily   6/30/2017 at 0819     montelukast (SINGULAIR) 10 MG tablet Take 10 mg by mouth At Bedtime   6/29/2017 at 2108     [START ON 7/1/2017] bumetanide (BUMEX) 2 MG tablet Take 1 tablet (2 mg) by mouth daily   Unknown at Unknown time     senna-docusate (SENOKOT-S;PERICOLACE) 8.6-50 MG per tablet Take 2 tablets by mouth 2 times daily 100 tablet  Unknown at Unknown time     polyethylene glycol (MIRALAX/GLYCOLAX) Packet Take 17 g by mouth daily as needed for constipation (for no bm in 1 day) 7 packet  Unknown at Unknown time     diclofenac (VOLTAREN) 1 % GEL topical gel Apply 2 g topically 4 times daily as needed for moderate pain 100 g 0 Unknown at Unknown time     albuterol (ALBUTEROL) 108 (90 BASE) MCG/ACT Inhaler Inhale 2 puffs into the lungs every 4 hours as needed for shortness of breath / dyspnea or wheezing   Unknown at Unknown time     cyanocobalamin (VITAMIN B12) 1000 MCG/ML injection Inject 1,000 mcg into the muscle every 30 days   Unknown at Unknown time     umeclidinium (INCRUSE ELLIPTA) 62.5 MCG/INH oral inhaler Inhale 1 puff into the lungs daily   Unknown at Unknown time     zinc sulfate (ZINCATE) 220 (50 ZN) MG capsule Take 220 mg by mouth 2 times daily   Unknown at Unknown time       ALLERGIES     Allergies   Allergen Reactions     Ace Inhibitors Unknown     Penicillins      Sulfa Drugs Unknown         Review of Systems  A 10 point ROS was performed and negative unless otherwise noted in HPI.     Physical Exam  VITAL SIGNS:  BP (!) 78/67  Pulse 87  Temp 98  F (36.7  C) (Oral)  Resp 16  Ht 1.727 m (5' 8\")  Wt 113.2 kg (249 lb 9.6 oz)  SpO2 96%  BMI 37.95 kg/m2  BMI:  Estimated body mass " "index is 37.95 kg/(m^2) as calculated from the following:    Height as of this encounter: 1.727 m (5' 8\").    Weight as of this encounter: 113.2 kg (249 lb 9.6 oz).     General: alert NAD  HEENT: mucus membranes moist no scleral icterus no cervical lymphadenopathy  Pulmonary: non labored clear on room air  Cardiovascular: LVAD hum  Abdominal: soft non tender non distended  Extremities: warm, well perfused, 2+ BLE edema in bilateral lower extremities, no tenderness in calves   MSK/neuro:   Mental Status:  alert and oriented x3   Cranial Nerves: grossly normal    Sensory: Normal to light touch in bilateral upper and lower extremities, reported numbness/tingling to bilateral fingertips   Strength: right shoulder OA though overall 4- 5/5 in all muscle groups of the upper and lower extremities    Reflexes: Present and symmetrical    Abnormal movements: None    Speech: clear coherent   Cognition: linear logical   Gait: not tested  Skin: driveline site dressing not removed dressing CDI      Labs  Cr. 1.57  INR 2.93  Hgb 8.9    IMPRESSION/PLAN:  Jim Willingham is a 62 year old man with a past medical history of CKD stage III, DM type 2, CECILIA, Obesity s/p gastric bypass, HTN, Asthma, COPD, NICM with EF 20% who was admitted 6/15 with progressive sob, ultimately admitted to heart failure team and underwent VAD (Heartmate II) placement 6/19.  Postoperatively admitted to CICU, extubated POD 2.  Post operative course relatively uncomplicated, non sustained arrhthymias,  WALTER, small left pleural effusion, decreased strength and activity tolerance. Admitted to ARU 6/30 for ongoing aggressive rehabilitation and medical management.        Admission to acute inpatient rehab s/p LVAD    Impairment group code: 09      1. PT, OT 90 minutes of each on a daily basis, in addition to rehab nursing and close management of physiatrist.      2. Impairment of ADL's: Impaired ROM, balance, activity tolerance, safety, endurance, max assist for lbd " related to sternal precautions, able to complete simple grooming/hygiene with setup assist, mod assist for bathing .  Goal for independence with ADLs with support of family as needed.     3. Impairment of mobility:  Noted to have impaired balance endurance, safety and activity tolerance.  [place] 36/56, ambulating 40-50 feet with some dizziness with CGA with FWW and close wc follow.  Min assist to ascend 7 stairs.       4. Impairment of cognition/language/swallow:  n/a    NICM EF 20% s/p Heartmate II LVAD 6/19- asa 81 mg daily, warfarin with INR 2-3.  LVAD coordinator Rajani Abdi.  SVT 6/23-6/24, NSVT 6/25 MAP goal <90.  Patient and wife have passed LVAD training, encourage as much independence as possible with LVAD.    -continue asa, warfarin (appreciate pharmacy assist with dosing).   -continue   -sternal precautions  -doppler BP  -daily weights  -daily driveline dressing changes  -continue metoprolol 25 bid  -continue to monitor and record LVAD settings  -continue diuresis (add mirta hose)  -appreciate hospitalist assistance with management  -appreciate ongoing follow up per Cardiology HF NP q Tuesday  -monitor CBC q Mon, BMP q Mon/Thurs  -continue pain management (incisional pain):scheduled tylenol po prn dilaudid with tramadol for breakthrough- will need to tape during stay- consider topical agents    WALTER on CKD- CKD stage III with baseline CR 1.4  peak 2.12 current Cr. 1.57  -avoid nephrotoxic agents  -monitor bmp q Mon/Thurs    CECILIA- continue QHS BiPAP    Asthma and COPD- no cough or wheeze, reports stable with continuation of PTA BREO ELLIPTA, q 4 hour duo nebs, Singulair 10 mg QHS,     Depression/ insomnia- PTA Celexa, trazodone started during hospitalization for sleep  -continue Celexa  -continue trazodone, consider dc and trial melatonin which uses at home prn    S/p gastric bypass- continue B12  - no long acting medications    DM type 2- PTA on metformin, dose reduced due to WALTER on CKD during  admission  -recommendations per hospitalist team    Acute post op anemia- hgb stable 8.9  -monitor CBC q Mon    5. Adjustment to disability:  Mood stable support of [employer]  6. FEN: regular poor appetite reported  7. Bowel: continent  8. Bladder: continent  9. DVT Prophylaxis: warfarin  10. GI Prophylaxis: PPI x 30 days- per d/C team  11. Code: full  12. Disposition: home with wife  13. ELOS:  +/- 7 days  14. Rehab prognosis:  Expected improvement in strength, activity tolerance, while maintaining sternal precautions.   15. Follow up Appointments on Discharge: Cardiology, PCP,         Seen and discussed with Dr. Franco , PM&R staff physician     Madina Laguna PA-NINO  Rehab Service  Pager [phone]

## 2017-06-30 NOTE — CONSULTS
[facility] [place]    Hospitalist Consultation    Date of Admission:  6/30/2017  Date of Consult (When I saw the patient): 06/30/17    Assessment & Plan   Jim Willingham is a 62 year old gentleman with a long h/o NICM (EF20%, dilated LV, severe MR, Bi V ICD, ho VT), asthma/COPD, CKD, DM2, CECILIA, MDD, ho gastric bypass, admitted to TCU for poor endurance following LVAD Heartmate II placement on 6/19. His postoperative course was fairly uncomplicated except some SVT/Afib. On admission he had ARF on CKD which has resolved. Has been on metoprolol. No significant arrhythmia after 6/25 when I reviewed with telemetry.     1. Physical deconditioning: plan per primary rehab team  2. SP LVAD ( Heartmate II as Destination therapy ) for NICM (EF20%, dilated LV, severe MR, BiV ICD): He is doing well. No Sob or chest pain. MAP is good. Weight 249 lbs. Continue to monitor. Continue on Aspiring, warfarin, metoprolol and bumex. He does not look volume overloaded to me. He has 1+ pedal edema. Yesterday they increased bumex to 2 mg in am and 1 mg in PM. His creat bumped over last couple of days. I think we will keep at 1 mg BID and monitor daily weights and see how things are going. FU with cardiology and CV surgery in two weeks time  3. HO VT, SP BiV ICD, Post Operatively he had SVT/AFib. None after 6/25. ON metoprolol. Monitor  4  CKD. Baseline creat at 1.4 to 1.5. On admission to the [facility] Creat was 2.14. He had ARF on Admission at the hospital, but that has resolved. On Admission to TCU creat is 1.5 which is his baseline. I will keep him on bumex 1 mg BID. Monitor how things go. Daily weight. Monitor creat and K every few days.   5.  Asthma/COPD , CECILAI (obstructive sleep apnea): continue on Duoneb every 4 hrs prn, Breo Ellipta BID, singulair,  Umeclidium, CPAP at night. Start IS.   6.  H/O gastric bypass: Small frequent meal recommended, on Vit B12 injections  7.  DM;  Last Hemoglobina A1C was 6.7  on 05/24. Metformin dose has been dropped from 500 mg BID to 250 mg BID. He does have CKD with baseline around 1.4 to 1.5. He is doing okay. LFTS have been fine. Okay to continue metformin for now. If creatinine goes up, consider stopping it. I will check BG premeal and HS and have Sliding scale insulin for high BG.      DVT Prophylaxis: Warfarin  Code Status: Full Code    Disposition: per primary team.     Chantel Weiss MD     Reason for Consult   Reason for consult: I was asked by Nabor Franco to evaluate this patient heart failure after LVAD.    Chief Complaint      Weakness, chronic sob    History of Present Illness    Jim Willingham is a 62 year old gentleman with a long h/o NICM (EF20%, dilated LV, severe MR, Bi V ICD, ho VT), asthma/COPD, CKD, DM2, CECILIA, MDD, presents in ambulatory cardiogenic shock, WALTER and accelerated LVAD implantation. He got hospitalized  From 6/11 through 6/30 for ambulatory cardiogenic shock and decompensation of his heart failure. He underwent Heartmate II LVAD palcement on 6/19 by Dr Quigley. IABP removed on POD 1. LVAD is destination therapy. Post opeartive course complicated by SVT/Afib. No arrhythmia after 6/25. He is on metoprolol.  Otherwise he did fine. He was on bumex 1 mg BID. Yesterday he got extra dose of 1 mg. Dry weight is around 257 lbs. Current weight is 149 lbs. He had decreased strength and activity tolerance. Has been admitted to TCU.     He is weak, other than that,  he is doing fairly well. Reports some pain at the sternotomy area. Has been using Duonebs for his asthma/COPD. No diarrhea, no abdominal pain. No bleeding.  No fevers etc.     Past Medical History    I have reviewed this patient's medical history and updated it with pertinent information if needed.   Past Medical History:   Diagnosis Date     Benign essential hypertension 5/11/2017     Cardiomyopathy, unspecified (H) 5/8/2017     CKD (chronic kidney disease) stage 3, GFR 30-59 ml/min 5/11/2017      Depression 2017     Diabetes mellitus (H) 2017     H/O gastric bypass 2017     ICD (implantable cardioverter-defibrillator), biventricular, in situ 2017     NICM (nonischemic cardiomyopathy) (H)/ EF 20% 2017    ECHO: LVEDd. 7.66 cm, Restrictive pattern , Severe mitral valve regurgitation     CECILIA (obstructive sleep apnea) 2017     Paroxysmal atrial fibrillation (H) 2017     Paroxysmal VT (H) 2017     Vitamin B12 deficiency (non anemic) 2017       Past Surgical History   I have reviewed this patient's surgical history and updated it with pertinent information if needed.  Past Surgical History:   Procedure Laterality Date     GI SURGERY      [facility]     INSERT VENTRICULAR ASSIST DEVICE LEFT (HEARTMATE II) N/A 2017    Procedure: INSERT VENTRICULAR ASSIST DEVICE LEFT (HEARTMATE II);  Median Sternotomy Heartmate II Left Ventricular Assist Device Insertion on Pump Oxygenator;  Surgeon: Ronnie Quigley MD;  Location: [facility] OR     ORTHOPEDIC SURGERY      right knee wired       Prior to Admission Medications   Prior to Admission Medications   Prescriptions Last Dose Informant Patient Reported? Taking?   HYDROmorphone (DILAUDID) 2 MG tablet 2017 at 0815  No Yes   Sig: Take 1-2 tablets (2-4 mg) by mouth every 3 hours as needed for moderate to severe pain   acetaminophen (TYLENOL) 325 MG tablet 2017 at 0816  No Yes   Sig: Take 2 tablets (650 mg) by mouth every 6 hours   albuterol (ALBUTEROL) 108 (90 BASE) MCG/ACT Inhaler Unknown at Unknown time  Yes No   Sig: Inhale 2 puffs into the lungs every 4 hours as needed for shortness of breath / dyspnea or wheezing   aspirin 81 MG chewable tablet 2017 at 0816  No Yes   Si tablet (81 mg) by Oral or Feeding Tube route daily   bumetanide (BUMEX) 1 MG tablet 2017 at 0817  No Yes   Sig: Take 1 tablet (1 mg) by mouth daily   bumetanide (BUMEX) 2 MG tablet Unknown at Unknown time  No No   Sig: Take 1 tablet (2 mg)  by mouth daily   citalopram (CELEXA) 20 MG tablet 6/30/2017 at 0817  Yes Yes   Sig: Take 20 mg by mouth daily   cyanocobalamin (VITAMIN B12) 1000 MCG/ML injection Unknown at Unknown time  Yes No   Sig: Inject 1,000 mcg into the muscle every 30 days   diclofenac (VOLTAREN) 1 % GEL topical gel Unknown at Unknown time  No No   Sig: Apply 2 g topically 4 times daily as needed for moderate pain   fluticasone-vilanterol (BREO ELLIPTA) 200-25 MCG/INH oral inhaler 6/30/2017 at 0819  Yes Yes   Sig: Inhale 1 puff into the lungs daily   ipratropium - albuterol 0.5 mg/2.5 mg/3 mL (DUONEB) 0.5-2.5 (3) MG/3ML neb solution 6/30/2017 at 1106  Yes Yes   Sig: Take 3 mLs by nebulization every 4 hours as needed for shortness of breath / dyspnea or wheezing   metFORMIN (GLUCOPHAGE) 500 MG tablet 6/30/2017 at 0830  No Yes   Sig: Take 0.5 tablets (250 mg) by mouth 2 times daily (with meals)   metoprolol (LOPRESSOR) 25 MG tablet 6/30/2017 at 0818  No Yes   Sig: Take 1 tablet (25 mg) by mouth 2 times daily   montelukast (SINGULAIR) 10 MG tablet 6/29/2017 at 2108  Yes Yes   Sig: Take 10 mg by mouth At Bedtime   pantoprazole (PROTONIX) 40 MG EC tablet 6/30/2017 at 0817  No Yes   Sig: Take 1 tablet (40 mg) by mouth every morning   polyethylene glycol (MIRALAX/GLYCOLAX) Packet Unknown at Unknown time  No No   Sig: Take 17 g by mouth daily as needed for constipation (for no bm in 1 day)   potassium chloride SA (K-DUR/KLOR-CON M) 20 MEQ CR tablet 6/30/2017 at 0818  No Yes   Sig: Take 1 tablet (20 mEq) by mouth 2 times daily   senna-docusate (SENOKOT-S;PERICOLACE) 8.6-50 MG per tablet Unknown at Unknown time  No No   Sig: Take 2 tablets by mouth 2 times daily   traMADol (ULTRAM) 50 MG tablet 6/30/2017 at 1046  No Yes   Sig: Take 2 tablets (100 mg) by mouth every 6 hours as needed for moderate pain or breakthrough pain   traZODone (DESYREL) 50 MG tablet 6/29/2017 at 2108  No Yes   Sig: Take 1 tablet (50 mg) by mouth At Bedtime   umeclidinium  (INCRUSE ELLIPTA) 62.5 MCG/INH oral inhaler Unknown at Unknown time  Yes No   Sig: Inhale 1 puff into the lungs daily   warfarin (COUMADIN) 2 MG tablet 6/29/2017 at 1809  No Yes   Sig: Take 4 mg PO daily until next INR check. INR goal 2-3.   zinc sulfate (ZINCATE) 220 (50 ZN) MG capsule Unknown at Unknown time  Yes No   Sig: Take 220 mg by mouth 2 times daily      Facility-Administered Medications: None     Allergies   Allergies   Allergen Reactions     Ace Inhibitors Unknown     Penicillins      Sulfa Drugs Unknown       Social History   I have reviewed this patient's social history and updated it with pertinent information if needed. Jim Willingham  reports that he quit smoking about 22 years ago. He does not have any smokeless tobacco history on file. He reports that he does not drink alcohol.    Family History   I have reviewed this patient's family history and updated it with pertinent information if needed.   Family History   Problem Relation Age of Onset     CEREBROVASCULAR DISEASE Mother      DIABETES Mother      Hypertension Mother      Coronary Artery Disease Father      Type 2 Diabetes Father        Review of Systems   The 10 point Review of Systems is negative other than noted in the HPI or here.     Physical Exam   Temp: 98  F (36.7  C) Temp src: Oral BP: (!) 78/67 (map 74) Pulse: 87   Resp: 16 SpO2: 96 % O2 Device: None (Room air)    Vital Signs with Ranges  Temp:  [98  F (36.7  C)-98.9  F (37.2  C)] 98  F (36.7  C)  Pulse:  [87] 87  Heart Rate:  [86-92] 91  Resp:  [16-18] 16  BP: (78-92)/(67-82) 78/67  SpO2:  [95 %-96 %] 96 %  249 lbs 9.6 oz    Constitutional: Awake, alert, cooperative, no apparent distress.  Eyes: Conjunctiva and pupils examined and normal.  HEENT: Moist mucous membranes, normal dentition.  Respiratory: Clear to auscultation bilaterally, no crackles or wheezing.  Cardiovascular: humming of the heartmate device heard, no significant JVD on exam, 1+ pedal edema  GI: Soft,  non-distended, non-tender, normal bowel sounds.  Lymph/Hematologic: No anterior cervical or supraclavicular adenopathy.  Skin: No rashes, no cyanosis, no edema.  Musculoskeletal: No joint swelling, erythema or tenderness.  Neurologic: Cranial nerves 2-12 intact, normal strength and sensation.  Psychiatric: Alert, oriented to person, place and time, no obvious anxiety or depression.    Data   -Data reviewed today: All pertinent laboratory and imaging results from this encounter were reviewed. I personally reviewed no images or EKG's today.    Recent Labs  Lab 06/30/17  0717 06/29/17  0646 06/28/17  0727 06/27/17  0817 06/26/17  0606   WBC  --   --  9.3 9.4 9.4   HGB  --   --  8.9* 8.9* 8.3*   MCV  --   --  91 91 90   PLT  --   --  336 268 218   INR 2.93* 2.74* 3.03* 3.29* 2.83*    133 133 134 135   POTASSIUM 4.5 4.7 4.5 4.3 4.0   CHLORIDE 100 99 100 101 103   CO2 26 25 26 25 24   BUN 23 26 24 28 28   CR 1.57* 1.62* 1.44* 1.39* 1.47*   ANIONGAP 8 9 6 8 8   QAMAR 8.8 8.9 8.8 8.9 9.0   GLC 83 96 129* 138* 118*       No results found for this or any previous visit (from the past 24 hour(s)).

## 2017-06-30 NOTE — IP AVS SNAPSHOT
MRN:[id number]                      After Visit Summary   6/30/2017    Jim Willingham    MRN: [id number]           Thank you!     Thank you for choosing [facility] for your care. Our goal is always to provide you with excellent care. Hearing back from our patients is one way we can continue to improve our services. Please take a few minutes to complete the written survey that you may receive in the mail after you visit with us. Thank you!        Patient Information     Date Of Birth          1955        About your hospital stay     You were admitted on:  June 30, 2017 You last received care in the:  [facility]    You were discharged on:  July 7, 2017        Reason for your hospital stay       Rehab after LVAD placement to regain function.                  Who to Call     For medical emergencies, please call 911.  For non-urgent questions about your medical care, please call your primary care provider or clinic, [phone]          Attending Provider     Provider Specialty    Aidan Douglas MD Physical Medicine and Rehab    Nabor Franco MD Physical Medicine and Rehab       Primary Care Provider Office Phone # Fax #    Jovany Salas [phone]      After Care Instructions     Activity       Continue sternal precautions, no heavy lifting, no showering until directed by LVAD team, up with walker.            Diet       Follow this diet upon discharge:   Regular Diet Adult; Thin Liquids (water, ice chips, juice, milk, gelatin, ice cream, etc) + supplements (Ensure Clear/ pro-stat as desired)            Monitor and record       weight every day- if weight change >2 lbs in one day or >5 lbs in one week contact LVAD coordinator                  Follow-up Appointments     Follow Up and recommended labs and tests       Follow up with primary care provider, Physician No Ref-Primary, within 1-2 days to follow up on results.  The following labs/tests are recommended: INR to  be drawn by home RN. [facility] Dr. Yuan.            Follow Up and recommended labs and tests       INR on or before 7/9 to be drawn per home RN results faxed to Dr. Delbert Salas at [facility] ([phone]) phone [phone]  Remainder of labs per LVAD team, prior to appointments                  Your next 10 appointments already scheduled     Jul 12, 2017  7:30 AM CDT   Lab with UC LAB    Health Lab ([facility])    [address] [place] [address]   [phone]            Jul 12, 2017  8:00 AM CDT   (Arrive by 7:45 AM)   Ventricular Assist Device with Cate Marshall NP   [facility] ([facility])    [address] [place] [address]   [phone]            Jul 12, 2017  9:00 AM CDT   (Arrive by 8:45 AM)   Implanted Defibulator with Uc Cv Device 1   [facility] ([facility])    [address] [place] [address]   [phone]            Jul 25, 2017  8:30 AM CDT   Lab with UC LAB   [facility] ([facility])    [address] [place] [address]   [phone]            Jul 25, 2017  9:00 AM CDT   (Arrive by 8:45 AM)   Ventricular Assist Device with Delisa Montgomery MD   [facility] ([facility])    [address] [place] [phone]   [phone]            Aug 11, 2017 11:00 AM CDT   Lab with UC LAB    Health Lab ([facility])    [address] [place] [address]   [phone]            Aug 11, 2017 11:30 AM CDT   (Arrive by 11:15 AM)   Ventricular Assist Device with Delisa Montgomery MD   [facility] ([facility])    [address] [place] [address]   [phone]            Aug 21, 2017  2:00 PM  CDT   Lab with  LAB    Health Lab ([facility])    [address] [facility]  1st Floor  [facility] [address] [phone]            Aug 21, 2017  2:30 PM CDT   (Arrive by 2:15 PM)   Return Visit with Ronnie Quigley MD   [facility] ([facility])    [address]  [address]rd [facility] [id number] [phone]            Sep 15, 2017 10:00 AM CDT   (Arrive by 9:45 AM)   Ventricular Assist Device with Jeanine Wilson NP   [facility] ([facility])    [address] [facility] [address]  [address] [facility] [id number] [phone]              Additional Services     Home Care OT Referral for Hospital Discharge       OT to eval and treat    Your provider has ordered home care - occupational therapy. If you have not been contacted within 2 days of your discharge please call the department phone number listed on the top of this document.            Home Care PT Referral for Hospital Discharge       PT to eval and treat    Your provider has ordered home care - physical therapy. If you have not been contacted within 2 days of your discharge please call the department phone number listed on the top of this document.            Home care nursing referral       RN skilled nursing visit. RN to assess vital signs and weight, respiratory and cardiac status, patients ability to take and record daily blood pressure, temp and weight, pain level and activity tolerance, incision for signs/symptoms of infection, hydration, nutrition and bowel status, home safety and obtain INR and contact PCP with results for dosage change of Warfarin.    Your provider has ordered home care nursing services. If you have not been contacted within 2 days of your discharge please call the inpatient department phone number at [phone] .                  Further instructions from your care team       HOME CARE  [facility] [phone] will  "provide RN, PT, OT. They will call you after you discharge to schedule the first visit.     Labs (INR)  to be drawn (on or before 7/9) and faxed to [facility] fax [phone] ATTN: Dr. Delbert Salas phone [phone]      FOLLOW UP APPOINTMENTS:   As scheduled: 7/12, 7/25, 8/11 with  LVAD team with labs prior to visit  8/21 with Dr. Trujillo LVAD surgeon       Warfarin Instruction     You have started taking a medicine called warfarin. This is a blood-thinning medicine (anticoagulant). It helps prevent and treat blood clots.      Before leaving the hospital, make sure you know how much to take and how long to take it.      You will need regular blood tests to make sure your blood is clotting safely. It is very important to see your doctor for regular blood tests.    Talk to your doctor before taking any new medicine (this includes over-the-counter drugs and herbal products). Many medicines can interact with warfarin. This may cause more bleeding or too much clotting.     Eating a lot of vitamin K--found in green, leafy vegetables--can change the way warfarin works in your body. Do NOT avoid these foods. Instead, try to eat the same amount each day.     Bleeding is the most common side-effect of warfarin. You may notice bleeding gums, a bloody nose, bruises and bleeding longer when you cut yourself. See a doctor at once if:   o You cough up blood  o You find blood in your stool (poop)  o You have a deep cut, or a cut that bleeds longer than 10 minutes   o You have a bad cut, hard fall, accident or hit your head (go to urgent care or the emergency room).    For women who can get pregnant: This medicine can harm an unborn baby. Be very careful not to get pregnant while taking this medicine. If you think you might be pregnant, call your doctor right away.    For more information, read \"Guide to Warfarin Therapy,  the booklet you received in the hospital.        Pending Results     No orders found from 6/28/2017 to " "2017.            Statement of Approval     Ordered          17 0907  I have reviewed and agree with all the recommendations and orders detailed in this document.  EFFECTIVE NOW     Approved and electronically signed by:  Madina Laguna PA             Admission Information     Date & Time Provider Department Dept. Phone    2017 Nabor Franco MD  ACUTE REHAB [phone]      Your Vitals Were     Blood Pressure Pulse Temperature Respirations Height Weight    85/68 (BP Location: Right arm) 91 97.3  F (36.3  C) (Oral) 20 1.727 m (5' 8\") 110.8 kg (244 lb 4.8 oz)    Pulse Oximetry BMI (Body Mass Index)                99% 37.15 kg/m2          [employer] Information     [employer] lets you send messages to your doctor, view your test results, renew your prescriptions, schedule appointments and more. To sign up, go to [email or web address] . Click on \"Log in\" on the left side of the screen, which will take you to the Welcome page. Then click on \"Sign up Now\" on the right side of the page.     You will be asked to enter the access code listed below, as well as some personal information. Please follow the directions to create your username and password.     Your access code is: [email or web address]  Expires: 2017  6:30 AM     Your access code will  in 90 days. If you need help or a new code, please call your [facility] clinic or [phone].        Care EveryWhere ID     This is your Care EveryWhere ID. This could be used by other organizations to access your [facility] medical records  [id number]        Equal Access to Services     [facility] Hospital[facility]FFY : Hadii andrea Gastelum, wathierry james, qakendra taylor. So [facility] [phone].    ATENCIÓN: Si habla español, tiene a roger disposición servicios gratuitos de asistencia lingüística. Llame al [phone].    We comply with applicable federal civil rights laws and [place] laws. We do not " discriminate on the basis of race, color, national origin, age, disability sex, sexual orientation or gender identity.               Review of your medicines      CONTINUE these medicines which may have CHANGED, or have new prescriptions. If we are uncertain of the size of tablets/capsules you have at home, strength may be listed as something that might have changed.        Dose / Directions    bumetanide 2 MG tablet   Commonly known as:  BUMEX   This may have changed:  Another medication with the same name was removed. Continue taking this medication, and follow the directions you see here.   Used for:  LVAD (left ventricular assist device) present (H)        Dose:  2 mg   Take 1 tablet (2 mg) by mouth daily   Refills:  0       HYDROmorphone 2 MG tablet   Commonly known as:  DILAUDID   This may have changed:    - how much to take  - when to take this   Used for:  Acute post-operative pain        Dose:  2 mg   Take 1 tablet (2 mg) by mouth every 4 hours as needed for moderate to severe pain   Quantity:  45 tablet   Refills:  0       senna-docusate 8.6-50 MG per tablet   Commonly known as:  SENOKOT-S;PERICOLACE   This may have changed:    - when to take this  - reasons to take this   Used for:  Acute post-operative pain        Dose:  2 tablet   Take 2 tablets by mouth 2 times daily as needed for constipation   Quantity:  100 tablet   Refills:  0       traZODone 50 MG tablet   Commonly known as:  DESYREL   This may have changed:    - how much to take  - how to take this  - when to take this  - additional instructions   Used for:  Acute post-operative pain, LVAD (left ventricular assist device) present (H)        Take 50 mg by mouth nightly x one week then reduce to 25 mg (1/2 tab) by mouth nightly x one week then stop.   Quantity:  11 tablet   Refills:  0       warfarin 3 MG tablet   Commonly known as:  COUMADIN   Indication:  inr=2.56   This may have changed:    - medication strength  - additional instructions   Used  for:  LVAD (left ventricular assist device) present (H)        Use as directed.   Quantity:  30 tablet   Refills:  0         CONTINUE these medicines which have NOT CHANGED        Dose / Directions    acetaminophen 325 MG tablet   Commonly known as:  TYLENOL   Used for:  Acute post-operative pain        Dose:  650 mg   Take 2 tablets (650 mg) by mouth every 6 hours   Quantity:  100 tablet   Refills:  0       albuterol 108 (90 BASE) MCG/ACT Inhaler   Generic drug:  albuterol        Dose:  2 puff   Inhale 2 puffs into the lungs every 4 hours as needed for shortness of breath / dyspnea or wheezing   Refills:  0       aspirin 81 MG chewable tablet   Used for:  LVAD (left ventricular assist device) present (H)        Dose:  81 mg   1 tablet (81 mg) by Oral or Feeding Tube route daily   Quantity:  36 tablet   Refills:  0       BREO ELLIPTA 200-25 MCG/INH oral inhaler   Generic drug:  fluticasone-vilanterol        Dose:  1 puff   Inhale 1 puff into the lungs daily   Refills:  0       celeXA 20 MG tablet   Generic drug:  citalopram        Dose:  20 mg   Take 20 mg by mouth daily   Refills:  0       cyanocobalamin 1000 MCG/ML injection   Commonly known as:  VITAMIN B12        Dose:  1000 mcg   Inject 1,000 mcg into the muscle every 30 days   Refills:  0       INCRUSE ELLIPTA 62.5 MCG/INH oral inhaler   Generic drug:  umeclidinium        Dose:  1 puff   Inhale 1 puff into the lungs daily   Refills:  0       ipratropium - albuterol 0.5 mg/2.5 mg/3 mL 0.5-2.5 (3) MG/3ML neb solution   Commonly known as:  DUONEB        Dose:  3 mL   Take 3 mLs by nebulization every 4 hours as needed for shortness of breath / dyspnea or wheezing   Refills:  0       metFORMIN 500 MG tablet   Commonly known as:  GLUCOPHAGE   Used for:  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, without long-term current use of insulin, unspecified CKD stage (H)        Dose:  250 mg   Take 0.5 tablets (250 mg) by mouth 2 times daily  (with meals)   Quantity:  60 tablet   Refills:  0       metoprolol 25 MG tablet   Commonly known as:  LOPRESSOR   Used for:  LVAD (left ventricular assist device) present (H), Paroxysmal atrial fibrillation (H)        Dose:  25 mg   Take 1 tablet (25 mg) by mouth 2 times daily   Quantity:  60 tablet   Refills:  0       pantoprazole 40 MG EC tablet   Commonly known as:  PROTONIX   Used for:  Acute post-operative pain        Dose:  40 mg   Take 1 tablet (40 mg) by mouth every morning   Quantity:  30 tablet   Refills:  0       polyethylene glycol Packet   Commonly known as:  MIRALAX/GLYCOLAX   Used for:  Acute post-operative pain        Dose:  17 g   Take 17 g by mouth daily as needed for constipation (for no bm in 1 day)   Quantity:  7 packet   Refills:  0       potassium chloride SA 20 MEQ CR tablet   Commonly known as:  K-DUR/KLOR-CON M   Used for:  LVAD (left ventricular assist device) present (H)        Dose:  20 mEq   Take 1 tablet (20 mEq) by mouth 2 times daily   Quantity:  60 tablet   Refills:  0       SINGULAIR 10 MG tablet   Generic drug:  montelukast        Dose:  10 mg   Take 10 mg by mouth At Bedtime   Refills:  0       traMADol 50 MG tablet   Commonly known as:  ULTRAM   Used for:  Acute post-operative pain        Dose:  100 mg   Take 2 tablets (100 mg) by mouth every 6 hours as needed for moderate pain or breakthrough pain   Quantity:  28 tablet   Refills:  0       zinc sulfate 220 (50 ZN) MG capsule   Commonly known as:  ZINCATE        Dose:  220 mg   Take 220 mg by mouth 2 times daily   Refills:  0            Where to get your medicines      [facility] [address] [place] [address] [address] AT [facility] [address]  [address] [address] [place] [address]     Phone:  [phone]     metFORMIN 500 MG tablet    pantoprazole 40 MG EC tablet    potassium chloride SA 20 MEQ CR tablet    senna-docusate 8.6-50 MG per tablet    traZODone 50 MG tablet     warfarin 3 MG tablet         Some of these will need a paper prescription and others can be bought over the counter. Ask your nurse if you have questions.     Bring a paper prescription for each of these medications     HYDROmorphone 2 MG tablet       You don't need a prescription for these medications     aspirin 81 MG chewable tablet                Protect others around you: Learn how to safely use, store and throw away your medicines at www.disposemymeds.org.             Medication List: This is a list of all your medications and when to take them. Check marks below indicate your daily home schedule. Keep this list as a reference.      Medications           Morning Afternoon Evening Bedtime As Needed    acetaminophen 325 MG tablet   Commonly known as:  TYLENOL   Take 2 tablets (650 mg) by mouth every 6 hours   Last time this was given:  650 mg on 7/7/2017  6:34 AM                                albuterol 108 (90 BASE) MCG/ACT Inhaler   Inhale 2 puffs into the lungs every 4 hours as needed for shortness of breath / dyspnea or wheezing   Generic drug:  albuterol                                aspirin 81 MG chewable tablet   1 tablet (81 mg) by Oral or Feeding Tube route daily   Last time this was given:  81 mg on 7/7/2017  8:02 AM                                BREO ELLIPTA 200-25 MCG/INH oral inhaler   Inhale 1 puff into the lungs daily   Last time this was given:  1 puff on 7/7/2017  8:04 AM   Generic drug:  fluticasone-vilanterol                                bumetanide 2 MG tablet   Commonly known as:  BUMEX   Take 1 tablet (2 mg) by mouth daily   Last time this was given:  2 mg on 7/7/2017  8:03 AM                                celeXA 20 MG tablet   Take 20 mg by mouth daily   Last time this was given:  20 mg on 7/7/2017  8:03 AM   Generic drug:  citalopram                                cyanocobalamin 1000 MCG/ML injection   Commonly known as:  VITAMIN B12   Inject 1,000 mcg into the muscle every 30 days    Last time this was given:  1,000 mcg on 7/1/2017 12:36 AM                                HYDROmorphone 2 MG tablet   Commonly known as:  DILAUDID   Take 1 tablet (2 mg) by mouth every 4 hours as needed for moderate to severe pain   Last time this was given:  2 mg on 7/7/2017  6:34 AM                                INCRUSE ELLIPTA 62.5 MCG/INH oral inhaler   Inhale 1 puff into the lungs daily   Last time this was given:  1 puff on 7/7/2017  8:05 AM   Generic drug:  umeclidinium                                ipratropium - albuterol 0.5 mg/2.5 mg/3 mL 0.5-2.5 (3) MG/3ML neb solution   Commonly known as:  DUONEB   Take 3 mLs by nebulization every 4 hours as needed for shortness of breath / dyspnea or wheezing   Last time this was given:  3 mLs on 7/7/2017  7:44 AM                                metFORMIN 500 MG tablet   Commonly known as:  GLUCOPHAGE   Take 0.5 tablets (250 mg) by mouth 2 times daily (with meals)   Last time this was given:  250 mg on 7/7/2017  8:02 AM                                metoprolol 25 MG tablet   Commonly known as:  LOPRESSOR   Take 1 tablet (25 mg) by mouth 2 times daily   Last time this was given:  25 mg on 7/7/2017  8:03 AM                                pantoprazole 40 MG EC tablet   Commonly known as:  PROTONIX   Take 1 tablet (40 mg) by mouth every morning   Last time this was given:  40 mg on 7/7/2017  8:03 AM                                polyethylene glycol Packet   Commonly known as:  MIRALAX/GLYCOLAX   Take 17 g by mouth daily as needed for constipation (for no bm in 1 day)                                potassium chloride SA 20 MEQ CR tablet   Commonly known as:  K-DUR/KLOR-CON M   Take 1 tablet (20 mEq) by mouth 2 times daily   Last time this was given:  20 mEq on 7/7/2017  8:02 AM                                senna-docusate 8.6-50 MG per tablet   Commonly known as:  SENOKOT-S;PERICOLACE   Take 2 tablets by mouth 2 times daily as needed for constipation   Last time this  was given:  2 tablets on 7/7/2017  8:03 AM                                SINGULAIR 10 MG tablet   Take 10 mg by mouth At Bedtime   Last time this was given:  10 mg on 7/6/2017 10:04 PM   Generic drug:  montelukast                                traMADol 50 MG tablet   Commonly known as:  ULTRAM   Take 2 tablets (100 mg) by mouth every 6 hours as needed for moderate pain or breakthrough pain   Last time this was given:  100 mg on 7/7/2017  8:09 AM                                traZODone 50 MG tablet   Commonly known as:  DESYREL   Take 50 mg by mouth nightly x one week then reduce to 25 mg (1/2 tab) by mouth nightly x one week then stop.   Last time this was given:  50 mg on 7/6/2017 10:04 PM                                warfarin 3 MG tablet   Commonly known as:  COUMADIN   Use as directed.   Last time this was given:  3 mg on 7/6/2017  5:37 PM                                zinc sulfate 220 (50 ZN) MG capsule   Commonly known as:  ZINCATE   Take 220 mg by mouth 2 times daily   Last time this was given:  220 mg on 7/7/2017  8:02 AM

## 2017-06-30 NOTE — PLAN OF CARE
Problem: Goal Outcome Summary  Goal: Goal Outcome Summary  Outcome: Improving  Pt admitted 6/15 with SOB, likely r/t inadequate diuretics and improving with BiPAP.  S/p HM II LVAD PI's in the 5's and no alarms noted.  VS'S on RA with incisional pain and medicated with prn Dilaudid.  Pace, but some intermittent short runs of AIVR.  2+ LE edema.  Otherwise, pt slept well and up SBA with walker.  Continue to monitor and with POC.

## 2017-06-30 NOTE — PLAN OF CARE
Problem: Goal/Outcome  Goal: Goal Outcome Summary  FOCUS/GOAL  Bowel management and Bladder management     ASSESSMENT, INTERVENTIONS AND CONTINUING PLAN FOR GOAL:  Pt arrived on unit at 1330, oriented to call light and room. Continent of B&B as per report from RN at 6C. AOX4 and able to make needs known.  Mod A of 1 with CGA and walker. LVAD pt with sternal precautions. Pt is pleasant and cooperative. Continue with POC

## 2017-06-30 NOTE — PROGRESS NOTES
Transplant/LVAD Social Work Service Discharge Note      Patient Name:  Jim Willingham     Anticipated Discharge Date:  6/30/17    Discharge Disposition:   ARU:  [facility]    Additional Services/Equipment Arranged:  W/C ride via [employer] at 1pm.      Patient / Family response to discharge plan:  Jim states that he had let his wife know. They are glad to be going to the next step in his recovery.      Persons notified of above discharge plan:  CVTS, Bedside Nurse, and [facility].    Education provided by CLIFF at discharge: CLIFF reminded Jim that SW is available outpatient if needed.     Plan: SW will continue to be available as needed.    Pager [phone]

## 2017-06-30 NOTE — PLAN OF CARE
Problem: Ventricular Assist Device (Adult)  Goal: Signs and Symptoms of Listed Potential Problems Will be Absent or Manageable (Ventricular Assist Device)  Signs and symptoms of listed potential problems will be absent or manageable by discharge/transition of care (reference Ventricular Assist Device (Adult) CPG).   Outcome: Improving  D/I/A: Pt up in chair for part of evening.  Visited with family.  Wife at bedside and supportive.  A+O x4 and able to make needs known.  Monitored and treated pain effectively with current med orders.  VSSA.  Pleasant and cooperative with cares and treatments.  LVAD operating within normal limits and free from alarm.  P: Continue to monitor status.

## 2017-06-30 NOTE — DISCHARGE SUMMARY
[facility], [place]   Cardiothoracic Surgery Hospital Discharge Summary     Jim Willingham MRN# [id number]   Age: 62 year old YOB: 1955     Admitting Physician:  Ronnie Quigley MD  Discharge Physician:  ROGER Brady  Primary Care Physician:        No Ref-Primary, Physician     DATE OF ADMISSION: 6/15/2017     DATE OF DISCHARGE: June 30, 2017          Admission Diagnoses:   1. NICM with EF 20%, Stage D, NYHA Class IIIB. Biventricular pacing. Severe MR.   2. COPD  3. Asthma  4. CKD stage III  5. DM2  6. CECILIA on BiPAP at night  7. Cardiogenic shock requiring IV inotropes and Intra-aortic Balloon pump  8. Obesity, s/p gastric bypass   9. HTN          Discharge Diagnosis:   1. NICM with EF 20%, Stage D, NYHA Class IIIB. Biventricular pacing.  S/P LVAD placement 6/19/17. Improved Mitral insufficiency.   2. COPD  3. Asthma  4. CKD stage III  5. DM2   6. CECILIA on BiPAP at night  7. Obesity, s/p gastric bypass   8. HTN  9. Small stable Left pleural effusion     PROCEDURES PERFORMED:   Date: 6/15/2017.  Surgeon: Dr. Ronnie Quigley   Placement of HeartMate II left ventricular assist device (intracorporeal left ventricular assist device).     INTRAOPERATIVE COMPLICATIONS:  None    PATHOLOGY RESULTS:    Surgical Pathology 6/19/17-   Heart, left ventricle apex, resection: Myocardial hypertrophy with focal replacement and pericellular   Fibrosis, Focal fatty metaplasia.     CULTURE RESULTS:    1. Nares 6/11/17: MRSA and SA negative, final.     DRAINS/TUBES PRESENT AT DISCHARGE:  None    CONSULTS:    1.  PT/OT  2.  Rheumatology  3.  Dental     Patient discharged on aspirin:  Yes 81 mg  Patient discharged on beta blocker: yes    Patient discharged on ACE Inhibitor/ARB: no  Creatinine elevated           Discharge Disposition:   Discharged to rehabilitation facility            Condition on Discharge:   Discharge condition: Good   Discharge vitals: Blood pressure (!) 88/74, pulse 105,  "temperature 98.8  F (37.1  C), temperature source Oral, resp. rate 16, height 1.727 m (5' 8\"), weight 113.5 kg (250 lb 3.2 oz), SpO2 96 %.     Code status on discharge: Full Code     DAY OF DISCHARGE PHYSICAL EXAM:  Vitals:    06/29/17 1904 06/30/17 0036 06/30/17 0404 06/30/17 0824   BP: 92/77 (!) 88/82  (!) 88/74   BP Location: Right arm Right arm     Pulse:       Resp: 18 16 18 16   Temp: 98.2  F (36.8  C) 98.9  F (37.2  C)  98.8  F (37.1  C)   TempSrc: Oral Oral  Oral   SpO2: 95% 96%  96%   Weight:  113.5 kg (250 lb 3.2 oz)     Height:         Vitals:    06/28/17 0620 06/29/17 0608 06/30/17 0036   Weight: 114.9 kg (253 lb 3.2 oz) 114.5 kg (252 lb 8 oz) 113.5 kg (250 lb 3.2 oz)     MAPs: 71 - 92  Gen: AAO x 3, pleasant, NAD  CV: lvad humming, RRR, S1S2 normal, no murmurs, rubs, or gallops.   Pulm: CTA, no rhonchi or wheezes  Abd: obese, soft, non-tender, no guarding  Ext: trace peripheral edema, 1 + pitting  Incision: clean, dry, intact, no erythema  Chest Tube sites: dressings clean and dry  Lines: driveline dressing clean and dry   LVAD: speed 9200, flow 5.7 - 6.4, PI 4.6 - 5.6, power 5.8 - 7.     BMP    Recent Labs  Lab 06/30/17  0717 06/29/17  0646 06/28/17  0727 06/27/17  0817    133 133 134   POTASSIUM 4.5 4.7 4.5 4.3   CHLORIDE 100 99 100 101   QAMAR 8.8 8.9 8.8 8.9   CO2 26 25 26 25   BUN 23 26 24 28   CR 1.57* 1.62* 1.44* 1.39*   GLC 83 96 129* 138*     CBC    Recent Labs  Lab 06/28/17  0727 06/27/17  0817 06/26/17  0606 06/25/17  0826   WBC 9.3 9.4 9.4 9.6   RBC 3.12* 3.12* 2.90* 2.83*   HGB 8.9* 8.9* 8.3* 8.1*   HCT 28.4* 28.3* 26.2* 25.8*   MCV 91 91 90 91   MCH 28.5 28.5 28.6 28.6   MCHC 31.3* 31.4* 31.7 31.4*   RDW 14.4 14.3 14.0 13.8    268 218 190     INR    Recent Labs  Lab 06/30/17  0717 06/29/17  0646 06/28/17  0727 06/27/17  0817   INR 2.93* 2.74* 3.03* 3.29*      Hepatic Panel   Lab Results   Component Value Date    AST 23 06/15/2017     Lab Results   Component Value Date    ALT 26 " 06/15/2017     Lab Results   Component Value Date    ALBUMIN 3.8 06/15/2017        Ref. Range 5/24/2017 06:52 6/29/2017 06:46   Lactate Dehydrogenase Latest Ref Range: 85 - 227 U/L   261 (H)   343 (H)       Recent Labs  Lab 06/30/17  0717 06/29/17  0646 06/28/17  2154 06/28/17  1214 06/28/17  0727 06/28/17  0218 06/27/17  2127 06/27/17  1800 06/27/17  1352 06/27/17  0817  06/26/17  0606  06/25/17  0826   GLC 83 96  --   --  129*  --   --   --   --  138*  --  118*  --  116*   BGM  --   --  107* 117*  --  110* 144* 123* 154*  --   < >  --   < >  --    < > = values in this interval not displayed.    BRIEF HISTORY OF ILLNESS:  Jim Willingham is a 62 year old gentleman with a long h/o NICM (EF20%, dilated LV, severe MR), asthma, COPD, CKD, DM2, CECILIA, MDD, presented with acute on chronic hypoxic respiratory failure. Recently admitted in ambulatory cardiogenic shock with low MAPs and WALTER on CKD. SCr decreased appropriately and MAPs recovered. Pt discharged without losartan and half original dose of bumex. The evening prior to admission (ie 6/14/17), pt began noticing progressive shortness of breath without exertion. Had difficulty sleeping. Used CPAP as usual. Was sleeping flat on back. Awoke around 3am and couldn't fall back asleep until about 7am. Slept until about 8:30am. Got up, but continued on CPAP. Was originally scheduled to see Dr. Montgomery midday, but felt that he couldn't wait until then. Called EMS, who brought pt [facility] ED and admitted to Heart Failure service and workup for VAD completed and his VAD placement was moved up from 6/26 to 6/19.      HOSPITAL COURSE: Jim Willingham is a 62 year old male who on 6/19/2017 underwent the above-named procedures.  He tolerated the operation well and postoperatively was admitted to the CVICU.  IABP removed POD #1 and then weaned off NO. He was extubated on POD # 2.  His ICU stay was uncomplicated and treatment included the usual HD support.        He was transferred to  the post-surgical telemetry unit on POD # 3.  Daily progress note is as follows;     Neuro:   - Evaristo APAP, PRN dilaudid PO for pain, PRN tramadol for breakthrough pain   - PTA meds; Celexa, Trazodone     CV:   - HM II lvad as DT, stage D NYHA Class IIIB  - ASA 81 mg  - Nonsustained SVT/A-fib. Replace Lytes PRN. Metoprolol 25 mg BID per Cards II.      Pulm:   - Extubated POD #2.   - IS, deep breathe, Nebs q 6 hrs  - CECILIA: bipap q night.      ID:   - Completed jason-op ABx. Afebrile. WBC wnl.      GI / FEN:    - Hx gastric bypass, B12 supplement monthly injection.   - Reg diet, bowel regimen has been needed to maintain + BM.       Renal / :   - CKD stage III. Acute on chronic renal failure resolving. Creat 1.57      Heme:   - Hgb 8's. Plts WNL.      Endo:   - Sliding scale insulin not needed at discharge.   - Metformin 250 mg (lower dose than PTA).   - Holding PTA allopurinol for elevated Creatinine.      PPX:   - Protonix for 30 days.      Anticoagulation:   - Hep gtt low intensity used for bridging.   - Lifelong Coumadin for LVAD, goal 2-3.  INR 2.93    Prior to discharge, his pain was controlled well, he was able to perform most ADLs and ambulate without difficulty, and had full return of bowel and bladder function.  On June 30, 2017, he was discharged to [facility] TCU in stable condition.    ECHOCARDIOGRAM, 6/23/2017-   Interpretation Summary-   HM 2 at 9000 RPM.  LVAD cannula was seen in the expected anatomic position in the LV apex. LV end  diastolic diameter is 5.4cm. The Ejection Fraction is estimated at <20%.  The aortic valve opens intermittently.  Septum normal.  The inferior vena cava was normal in size with preserved respiratory variability.     There has been an interval placement of LVAD since the previous study  (6/12/17). The mitral insufficiency has decreased and the LV diameter has  decreased.      Left Ventricle  LV end diastolic diameter is 5.4cm. The Ejection Fraction is estimated  at  <20%. LVAD cannula was seen in the expected anatomic position in the LV apex.     Right Ventricle  Right ventricular function cannot be assessed due to poor image quality.  Global right ventricular function is mildly reduced. A pacemaker lead is noted  in the right ventricle.     Atria- Severe left atrial enlargement is present.     Mitral Valve- The mitral valve is normal. Trace mitral insufficiency is present.     Aortic Valve- The aortic valve opens intermittently.     Tricuspid Valve- The tricuspid valve cannot be assessed.     Pulmonic Valve- The pulmonic valve cannot be assessed.     Vessels- The inferior vena cava was normal in size with preserved respiratory variability.     Pericardium- No pericardial effusion is present.      Compared to Previous Study  There has been an interval placement of LVAD since the previous study  (6/12/17). The mitral insufficiency has decreased and the LV diamater has decreased.    CXR 6/29/17-   PA and lateral views of the chest upright. Left chest wall cardiac device with leads in stable position.  LVAD in stable position.  Intact median sternotomy wires. Cardiac silhouette is stably enlarged.  Questionable trace right apical pneumothorax. Stable perihilar and bibasilar streaky opacities. Left  pleural effusion is stable. The visualized upper abdomen, osseous structures, and soft tissues are  unremarkable.   IMPRESSION:   1. Stable support devices as above.  2. Questionable trace right apical pneumothorax.  3. Stable bibasilar atelectasis.  4. Left pleural effusion.    DISCHARGE INSTRUCTIONS:  Avoid lifting anything greater than te pounds for 6 weeks after surgery and then less than 20 pounds for an additional 6 weeks.    No driving until cleared by LVAD team.     Avoid strenuous activities such as bowling, vacuuming, raking, shoveling, golf or tennis for 12 weeks after your surgery. It is okay to resume sex if you feel comfortable in doing so. You may have to try  different positions with your partner.     Splint your chest incision by hugging a pillow or bringing your arms across your chest when coughing or sneezing. Avoid pushing off with your arms when getting up for the first month if you have had your sternum opened.    Shower or wash your incisions daily with soap and water (or as instructed), pat dry. Keep wound clean and dry, showers are okay after discharge, but don't let spray hit directly on incision. No baths or swimming for 1 month.  Clean wounds twice a day for 2 weeks with microklenz spray if available. Cover chest tube sites with gauze until they stop draining, then leave open. It is not abnormal for chest tube sites to drain yellowish/clear fluid for up to 2-3 weeks after surgery.   Watch for signs of infection: increased redness, tenderness, warmth or any drainage that appears infected (pus like) or is persistent.  Also a temperature > 100.5 F or chills. Call your surgeon or primary care provider's office immediately. Remove any skin glue left on incisions after 10 days. This will not affect your incision and can speed up healing.    Exercise is very important in your recovery. Please follow the guidelines set up for you in your cardiac rehab classes at the hospital. If outpatient cardiac rehab was ordered for you, we highly recommend you participate. If you have problems arranging your cardiac rehab, please call [phone].     Avoid sitting for prolonged periods of time, try to walk every hour during the day. If you have a leg incision, elevate your leg often when you are not walking.    Check your weight when you get home from the hospital and continue to check it daily through your recovery for at least a month. If you notice a weight gain of 2-3 pounds in a week, notify your primary care physician, cardiologist or surgeon.    Bowel activity may be slow after surgery. If necessary, you may take an over the counter laxative such as Milk of Magnesia or  Miralax. You may have stool softeners prescribed (docusate sodium, Senokot). We recommend using stool softeners while using narcotics for pain (oxycodone/percocet, hydrocodone/vicodin).      DENTAL VISITS AFTER SURGERY  If you have had your heart valve repaired or replaced, we do not recommend having any dental work done for 6 months and you will need to take an antibiotic prior to dental visits from now on.  Please notify your dentist before any procedure for the proper treatment needed. The antibiotic is taken by mouth one hour prior to visit. This includes routine cleanings.    DO NOT SMOKE.  IF YOU NEED HELP QUITTING, PLEASE TALK WITH YOUR CARDIOLOGIST OR PRIMARY DOCTOR.    You are on a blood thinner, follow the instructions you were given in the hospital and DO NOT SKIP this medication. Try and take it the same time everyday. Your primary care physician or coumadin clinic will manage the dosing.     If you have an LVAD device call your LVAD coordinator.     REGARDING PRESCRIPTION REFILLS.  If you need a refill on your pain medication contact us.  All other medications will be adjusted, discontinued and re-filled by your primary care physician and/or your cardiologist as they were prior to your surgery. We have given you enough for one to three month with possibly one refill.    FOLLOW UP APPOINTMENTS:   You will then return to the care of your primary physician and your cardiologist heart failure LVAD team.   It is important to call for an appointment to see your cardiologist in 2-3 weeks after discharge and your primary care physician in 2-4 weeks after discharge. If there is a need to return to see CT Surgery please call our  at [phone].  Your LVAD coordinator will assist in making appointments to be seen.     PRE-ADMISSION MEDICATIONS:    No current facility-administered medications on file prior to encounter.   Current Outpatient Prescriptions on File Prior to Encounter:  bumetanide (BUMEX) 1  MG tablet Take 1 tablet (1 mg) by mouth 2 times daily for 7 days   colchicine 0.6 MG tablet Take 1 tablet (0.6 mg) by mouth every other day (Patient taking differently: Take 0.6 mg by mouth daily )   oxyCODONE (ROXICODONE) 5 MG/5ML solution Take 2.5 mLs (2.5 mg) by mouth every 4 hours as needed (elbow pain)   docusate sodium (COLACE) 100 MG capsule Take 1 capsule (100 mg) by mouth 2 times daily   sennosides (SENOKOT) 8.6 MG tablet Take 1 tablet by mouth daily   diclofenac (VOLTAREN) 1 % GEL topical gel Apply 2 g topically 4 times daily as needed for moderate pain   albuterol (ALBUTEROL) 108 (90 BASE) MCG/ACT Inhaler Inhale 2 puffs into the lungs every 4 hours as needed for shortness of breath / dyspnea or wheezing   ipratropium - albuterol 0.5 mg/2.5 mg/3 mL (DUONEB) 0.5-2.5 (3) MG/3ML neb solution Take 3 mLs by nebulization every 4 hours as needed for shortness of breath / dyspnea or wheezing   citalopram (CELEXA) 20 MG tablet Take 20 mg by mouth daily   cyanocobalamin (VITAMIN B12) 1000 MCG/ML injection Inject 1,000 mcg into the muscle every 30 days   fluticasone-vilanterol (BREO ELLIPTA) 200-25 MCG/INH oral inhaler Inhale 1 puff into the lungs daily   metFORMIN (GLUCOPHAGE) 1000 MG tablet Take 1,000 mg by mouth daily (with dinner)   montelukast (SINGULAIR) 10 MG tablet Take 10 mg by mouth At Bedtime   traMADol (ULTRAM) 50 MG tablet Take 100 mg by mouth every 6 hours as needed for moderate pain   umeclidinium (INCRUSE ELLIPTA) 62.5 MCG/INH oral inhaler Inhale 1 puff into the lungs daily   zinc sulfate (ZINCATE) 220 (50 ZN) MG capsule Take 220 mg by mouth 2 times daily   hydrALAZINE (APRESOLINE) 10 MG tablet Take 1 tablet (10 mg) by mouth 3 times daily (Patient not taking: Reported on 6/15/2017)   diphenhydrAMINE (BENADRYL) 25 MG capsule Take 25 mg by mouth nightly as needed for itching or allergies        DISCHARGE MEDICATIONS:    Jim Willingham   Home Medication Instructions VITO:[id number]    Printed  on:06/30/17 0958   Medication Information                      acetaminophen (TYLENOL) 325 MG tablet  Take 2 tablets (650 mg) by mouth every 6 hours             albuterol (ALBUTEROL) 108 (90 BASE) MCG/ACT Inhaler  Inhale 2 puffs into the lungs every 4 hours as needed for shortness of breath / dyspnea or wheezing             aspirin 81 MG chewable tablet  1 tablet (81 mg) by Oral or Feeding Tube route daily             bumetanide (BUMEX) 1 MG tablet  Take 1 tablet (1 mg) by mouth daily             bumetanide (BUMEX) 2 MG tablet  Take 1 tablet (2 mg) by mouth daily             citalopram (CELEXA) 20 MG tablet  Take 20 mg by mouth daily             cyanocobalamin (VITAMIN B12) 1000 MCG/ML injection  Inject 1,000 mcg into the muscle every 30 days             diclofenac (VOLTAREN) 1 % GEL topical gel  Apply 2 g topically 4 times daily as needed for moderate pain             fluticasone-vilanterol (BREO ELLIPTA) 200-25 MCG/INH oral inhaler  Inhale 1 puff into the lungs daily             HYDROmorphone (DILAUDID) 2 MG tablet  Take 1-2 tablets (2-4 mg) by mouth every 3 hours as needed for moderate to severe pain             ipratropium - albuterol 0.5 mg/2.5 mg/3 mL (DUONEB) 0.5-2.5 (3) MG/3ML neb solution  Take 3 mLs by nebulization every 4 hours as needed for shortness of breath / dyspnea or wheezing             metFORMIN (GLUCOPHAGE) 500 MG tablet  Take 0.5 tablets (250 mg) by mouth 2 times daily (with meals)             metoprolol (LOPRESSOR) 25 MG tablet  Take 1 tablet (25 mg) by mouth 2 times daily             montelukast (SINGULAIR) 10 MG tablet  Take 10 mg by mouth At Bedtime             pantoprazole (PROTONIX) 40 MG EC tablet  Take 1 tablet (40 mg) by mouth every morning             polyethylene glycol (MIRALAX/GLYCOLAX) Packet  Take 17 g by mouth daily as needed for constipation (for no bm in 1 day)             potassium chloride SA (K-DUR/KLOR-CON M) 20 MEQ CR tablet  Take 1 tablet (20 mEq) by mouth 2 times  daily             senna-docusate (SENOKOT-S;PERICOLACE) 8.6-50 MG per tablet  Take 2 tablets by mouth 2 times daily             traMADol (ULTRAM) 50 MG tablet  Take 2 tablets (100 mg) by mouth every 6 hours as needed for moderate pain or breakthrough pain             traZODone (DESYREL) 50 MG tablet  Take 1 tablet (50 mg) by mouth At Bedtime             umeclidinium (INCRUSE ELLIPTA) 62.5 MCG/INH oral inhaler  Inhale 1 puff into the lungs daily             warfarin (COUMADIN) 2 MG tablet  Take 4 mg PO daily until next INR check. INR goal 2-3.             zinc sulfate (ZINCATE) 220 (50 ZN) MG capsule  Take 220 mg by mouth 2 times daily               CC:wesley Montgomery MD        [facility]   Cardiothoracic Surgery  Office phone: [phone]

## 2017-06-30 NOTE — IP AVS SNAPSHOT
[facility]    [address] [place] [id number]    Phone:  [phone]                                       After Visit Summary   6/30/2017    Jim Willingham    MRN: [id number]           After Visit Summary Signature Page     I have received my discharge instructions, and my questions have been answered. I have discussed any challenges I see with this plan with the nurse or doctor.    ..........................................................................................................................................  Patient/Patient Representative Signature      ..........................................................................................................................................  Patient Representative Print Name and Relationship to Patient    ..................................................               ................................................  Date                                            Time    ..........................................................................................................................................  Reviewed by Signature/Title    ...................................................              ..............................................  Date                                                            Time

## 2017-06-30 NOTE — H&P
Post Admission Physician Evaluation:    I have compared Mr. Sultana's condition on admission to acute rehabilitation to that outlined in the preadmission screen. History and physical exam performed by me. No significant differences are identified and the patient remains appropriate for an inpatient rehabilitation facility level of care to manage medical issues and address functional impairments due to (rehabilitation diagnosis) deconditioning following LVAD placment.    Comorbid medical conditions being managed: COPD,CKD,DM2, CECILIA, MDD.    Prior functional level: completely independent in ADLs and mobility    Present function: requires assistance of mobility and ADLs.    Anticipated rehabilitation course: will advance with therapies to a level of modified independence or better to allow for discharge home with assistance of wife.    Will benefit from intensive rehabilitation includin minutes each of PT and OT  Rehabilitation nursing  Close management by physiatry    Prognosis: good    Estimated length of stay: 7-10 days      ABDIEL Franco MD

## 2017-06-30 NOTE — PROGRESS NOTES
Patient was discharged to [facility] so they will handle post discharge follow up cares and coordination          Discharge Disposition:   ARU:  [facility]

## 2017-06-30 NOTE — DISCHARGE SUMMARY
DISCHARGE 6/30/17 1300  Discharged to: TCU  Via: [facility] transport  Accompanied by: Wife  Discharge Instructions: diet, activity, medications, warfarin, follow up appointments, when to call the MD, and what to watch out for (i.e. s/s of infection, increasing SOB, palpitations, chest pain, bleeding)  Prescriptions: To managed by TCU; medication list reviewed & sent with patient  Follow Up Appointments: arranged; information given  Belongings: All sent with patient  IV: out  Telemetry: off  Patient exhibits understanding of above discharge instructions; all questions answered.  Discharge Paperwork: faxed

## 2017-06-30 NOTE — PLAN OF CARE
Problem: Goal Outcome Summary  Goal: Goal Outcome Summary  OT: REC TCU, pt with increased pain today refusing mobility however showing good insight to self monitoring. Pt with full verbal understanding of sternal precautions.

## 2017-07-01 LAB
ANION GAP SERPL CALCULATED.3IONS-SCNC: 9 MMOL/L (ref 3–14)
BUN SERPL-MCNC: 22 MG/DL (ref 7–30)
CALCIUM SERPL-MCNC: 8.8 MG/DL (ref 8.5–10.1)
CHLORIDE SERPL-SCNC: 101 MMOL/L (ref 94–109)
CO2 SERPL-SCNC: 26 MMOL/L (ref 20–32)
CREAT SERPL-MCNC: 1.39 MG/DL (ref 0.66–1.25)
GFR SERPL CREATININE-BSD FRML MDRD: 52 ML/MIN/1.7M2
GLUCOSE BLDC GLUCOMTR-MCNC: 104 MG/DL (ref 70–99)
GLUCOSE BLDC GLUCOMTR-MCNC: 107 MG/DL (ref 70–99)
GLUCOSE BLDC GLUCOMTR-MCNC: 85 MG/DL (ref 70–99)
GLUCOSE BLDC GLUCOMTR-MCNC: 94 MG/DL (ref 70–99)
GLUCOSE BLDC GLUCOMTR-MCNC: 99 MG/DL (ref 70–99)
GLUCOSE SERPL-MCNC: 95 MG/DL (ref 70–99)
INR PPP: 2.66 (ref 0.86–1.14)
POTASSIUM SERPL-SCNC: 4.4 MMOL/L (ref 3.4–5.3)
SODIUM SERPL-SCNC: 136 MMOL/L (ref 133–144)

## 2017-07-01 PROCEDURE — 80048 BASIC METABOLIC PNL TOTAL CA: CPT | Performed by: PHYSICIAN ASSISTANT

## 2017-07-01 PROCEDURE — 12800006 ZZH R&B REHAB

## 2017-07-01 PROCEDURE — 25000132 ZZH RX MED GY IP 250 OP 250 PS 637: Performed by: HOSPITALIST

## 2017-07-01 PROCEDURE — 99232 SBSQ HOSP IP/OBS MODERATE 35: CPT | Performed by: INTERNAL MEDICINE

## 2017-07-01 PROCEDURE — 97535 SELF CARE MNGMENT TRAINING: CPT | Mod: GO

## 2017-07-01 PROCEDURE — 40000133 ZZH STATISTIC OT WARD VISIT

## 2017-07-01 PROCEDURE — 94640 AIRWAY INHALATION TREATMENT: CPT | Mod: 76

## 2017-07-01 PROCEDURE — 97162 PT EVAL MOD COMPLEX 30 MIN: CPT | Mod: GP

## 2017-07-01 PROCEDURE — 40000193 ZZH STATISTIC PT WARD VISIT

## 2017-07-01 PROCEDURE — 40000275 ZZH STATISTIC RCP TIME EA 10 MIN

## 2017-07-01 PROCEDURE — 25000128 H RX IP 250 OP 636: Performed by: HOSPITALIST

## 2017-07-01 PROCEDURE — 00000146 ZZHCL STATISTIC GLUCOSE BY METER IP

## 2017-07-01 PROCEDURE — 85610 PROTHROMBIN TIME: CPT | Performed by: PHYSICIAN ASSISTANT

## 2017-07-01 PROCEDURE — 25000132 ZZH RX MED GY IP 250 OP 250 PS 637: Performed by: PHYSICAL MEDICINE & REHABILITATION

## 2017-07-01 PROCEDURE — 97167 OT EVAL HIGH COMPLEX 60 MIN: CPT | Mod: GO

## 2017-07-01 PROCEDURE — 94640 AIRWAY INHALATION TREATMENT: CPT

## 2017-07-01 PROCEDURE — 97110 THERAPEUTIC EXERCISES: CPT | Mod: GO

## 2017-07-01 PROCEDURE — 25000125 ZZHC RX 250: Performed by: HOSPITALIST

## 2017-07-01 PROCEDURE — 36415 COLL VENOUS BLD VENIPUNCTURE: CPT | Performed by: PHYSICIAN ASSISTANT

## 2017-07-01 PROCEDURE — 97530 THERAPEUTIC ACTIVITIES: CPT | Mod: GP

## 2017-07-01 RX ORDER — WARFARIN SODIUM 5 MG/1
5 TABLET ORAL
Status: COMPLETED | OUTPATIENT
Start: 2017-07-01 | End: 2017-07-01

## 2017-07-01 RX ORDER — IPRATROPIUM BROMIDE AND ALBUTEROL SULFATE 2.5; .5 MG/3ML; MG/3ML
3 SOLUTION RESPIRATORY (INHALATION)
Status: DISCONTINUED | OUTPATIENT
Start: 2017-07-01 | End: 2017-07-07 | Stop reason: HOSPADM

## 2017-07-01 RX ADMIN — ACETAMINOPHEN 650 MG: 325 TABLET, FILM COATED ORAL at 10:11

## 2017-07-01 RX ADMIN — ACETAMINOPHEN 650 MG: 325 TABLET, FILM COATED ORAL at 22:05

## 2017-07-01 RX ADMIN — Medication 50 MG: at 00:37

## 2017-07-01 RX ADMIN — TRAMADOL HYDROCHLORIDE 100 MG: 50 TABLET, COATED ORAL at 08:13

## 2017-07-01 RX ADMIN — BUMETANIDE 1 MG: 1 TABLET ORAL at 16:09

## 2017-07-01 RX ADMIN — CITALOPRAM HYDROBROMIDE 20 MG: 10 TABLET ORAL at 08:04

## 2017-07-01 RX ADMIN — POTASSIUM CHLORIDE 20 MEQ: 750 TABLET, FILM COATED, EXTENDED RELEASE ORAL at 08:04

## 2017-07-01 RX ADMIN — UMECLIDINIUM 1 PUFF: 62.5 AEROSOL, POWDER ORAL at 08:05

## 2017-07-01 RX ADMIN — POTASSIUM CHLORIDE 20 MEQ: 750 TABLET, FILM COATED, EXTENDED RELEASE ORAL at 20:09

## 2017-07-01 RX ADMIN — ASPIRIN 81 MG 81 MG: 81 TABLET ORAL at 08:05

## 2017-07-01 RX ADMIN — HYDROMORPHONE HYDROCHLORIDE 4 MG: 2 TABLET ORAL at 20:08

## 2017-07-01 RX ADMIN — Medication 250 MG: at 18:16

## 2017-07-01 RX ADMIN — IPRATROPIUM BROMIDE AND ALBUTEROL SULFATE 3 ML: .5; 3 SOLUTION RESPIRATORY (INHALATION) at 02:38

## 2017-07-01 RX ADMIN — CYANOCOBALAMIN 1000 MCG: 1000 INJECTION, SOLUTION INTRAMUSCULAR at 00:36

## 2017-07-01 RX ADMIN — IPRATROPIUM BROMIDE AND ALBUTEROL SULFATE 3 ML: .5; 3 SOLUTION RESPIRATORY (INHALATION) at 07:24

## 2017-07-01 RX ADMIN — IPRATROPIUM BROMIDE AND ALBUTEROL SULFATE 3 ML: .5; 3 SOLUTION RESPIRATORY (INHALATION) at 20:45

## 2017-07-01 RX ADMIN — ACETAMINOPHEN 650 MG: 325 TABLET, FILM COATED ORAL at 05:44

## 2017-07-01 RX ADMIN — METOPROLOL TARTRATE 25 MG: 25 TABLET, FILM COATED ORAL at 20:09

## 2017-07-01 RX ADMIN — MONTELUKAST SODIUM 10 MG: 10 TABLET, FILM COATED ORAL at 22:05

## 2017-07-01 RX ADMIN — PANTOPRAZOLE SODIUM 40 MG: 40 TABLET, DELAYED RELEASE ORAL at 08:04

## 2017-07-01 RX ADMIN — ACETAMINOPHEN 650 MG: 325 TABLET, FILM COATED ORAL at 16:09

## 2017-07-01 RX ADMIN — HYDROMORPHONE HYDROCHLORIDE 4 MG: 2 TABLET ORAL at 05:42

## 2017-07-01 RX ADMIN — HYDROMORPHONE HYDROCHLORIDE 4 MG: 2 TABLET ORAL at 16:16

## 2017-07-01 RX ADMIN — FLUTICASONE FUROATE AND VILANTEROL TRIFENATATE 1 PUFF: 200; 25 POWDER RESPIRATORY (INHALATION) at 08:04

## 2017-07-01 RX ADMIN — HYDROMORPHONE HYDROCHLORIDE 4 MG: 2 TABLET ORAL at 13:25

## 2017-07-01 RX ADMIN — Medication 250 MG: at 09:25

## 2017-07-01 RX ADMIN — Medication 50 MG: at 22:57

## 2017-07-01 RX ADMIN — BUMETANIDE 1 MG: 1 TABLET ORAL at 08:04

## 2017-07-01 RX ADMIN — WARFARIN SODIUM 5 MG: 5 TABLET ORAL at 18:16

## 2017-07-01 RX ADMIN — ZINC SULFATE CAP 220 MG (50 MG ELEMENTAL ZN) 220 MG: 220 (50 ZN) CAP at 08:04

## 2017-07-01 RX ADMIN — SENNOSIDES AND DOCUSATE SODIUM 2 TABLET: 8.6; 5 TABLET ORAL at 08:04

## 2017-07-01 RX ADMIN — METOPROLOL TARTRATE 25 MG: 25 TABLET, FILM COATED ORAL at 08:04

## 2017-07-01 RX ADMIN — SENNOSIDES AND DOCUSATE SODIUM 2 TABLET: 8.6; 5 TABLET ORAL at 20:08

## 2017-07-01 RX ADMIN — ZINC SULFATE CAP 220 MG (50 MG ELEMENTAL ZN) 220 MG: 220 (50 ZN) CAP at 20:08

## 2017-07-01 NOTE — PLAN OF CARE
"Problem: Goal/Outcome  Goal: Goal Outcome Summary  FOCUS/GOAL  Bladder management, Nutrition/Feeding/Swallowing precautions, Pain management, Wound care management and Skin integrity     ASSESSMENT, INTERVENTIONS AND CONTINUING PLAN FOR GOAL:  Patient is alert/oriented x4 is able to communicate care needs appopriately, patient demonstrated understanding of safe management of LVAD by performing change from battery to wall without prompting or assistance just supervision, also patient initiated test run of the alarms per self.  LVAD parameters have been WNL, patient denies any SOB but has had ongoing pain on this shift, gave PRN Dilauded 4mg x3 on this shift per patient request q3 hours for incisional pain as well as left shoulder pain and pain is mostly controlled with that but also can have PRN Tylenol and Tramadol.  Patient did not want to get changed into gown for the night wanted to keep on clothes from home, per patient LVAD dressing change was done today prior to transfer to this unit, patient and wife have had education/testing for LVAD, please f/u with official skin audit when driveline site can be assessed.  Patient reports no appetite but did order a meal and states, \"I am trying to make myself eat but nothing has any taste.\"  Patient is voiding spontaneously using the urinal, is able to transfer with Ao1 but only did pivot transfers tonight from w/c to bed.  No additional care concerns at this time, continue with POC.            "

## 2017-07-01 NOTE — PHARMACY-ANTICOAGULATION SERVICE
Clinical Pharmacy - Warfarin Dosing Consult     Pharmacy has been consulted to manage this patient s warfarin therapy.  Indication: LVAD/RVAD  Therapy Goal: INR 2-3  Warfarin Prior to Admission: Yes  Recent documented change in oral intake/nutrition: No    INR   Date Value Ref Range Status   06/30/2017 2.93 (H) 0.86 - 1.14 Final   06/29/2017 2.74 (H) 0.86 - 1.14 Final     Warfarin is continued from previous admission.   Recommend warfarin 4 mg today.  Pharmacy will monitor Jim Willingham daily and order warfarin doses to achieve specified goal.      Please contact pharmacy as soon as possible if the warfarin needs to be held for a procedure or if the warfarin goals change.

## 2017-07-01 NOTE — PROGRESS NOTES
" 07/01/17 0826   Quick Adds   Type of Visit Initial PT Evaluation   Living Environment   Lives With spouse;child(lidia), adult;grandchild(lidia)   Living Arrangements house   Home Accessibility bed and bath are not on the first floor;bed and bath on same level;stairs to enter home;stairs within home   Number of Stairs to Enter Home 1   Number of Stairs Within Home 13  (5 steps down and 7 steps up)   Transportation Available family or friend will provide   Living Environment Comment Pt lives in split level home with 7 steps going up where pt states bathroom/bedroom/kitchen are located.   Self-Care   Dominant Hand right   Usual Activity Tolerance moderate   Current Activity Tolerance fair   Regular Exercise no   Equipment Currently Used at Home none   Activity/Exercise/Self-Care Comment Pt reports performing 9427-5416 steps a day in january but about a month ago pt was limited to 300 steps a day d/t poor endurance and SOB   Functional Level Prior   Ambulation 0-->independent   Transferring 0-->independent   Toileting 0-->independent   Bathing 0-->independent   Dressing 0-->independent   Eating 0-->independent   Communication 0-->understands/communicates without difficulty   Swallowing 0-->swallows foods/liquids without difficulty   Cognition 0 - no cognition issues reported   Fall history within last six months yes   Number of times patient has fallen within last six months 1   Which of the above functional risks had a recent onset or change? ambulation;transferring;toileting;dressing;bathing   Prior Functional Level Comment Pt report falling off of couch but doesnt remember fall. Pt believes fall was d/t low BP and remembers sitting on couch and woke up on the ground   General Information   Onset of Illness/Injury or Date of Surgery - Date 06/19/17   Referring Physician Nabor Franco MD   Patient/Family Goals Statement \"to be able to go to the park wiht my grand daughter\"   Pertinent History of Current Problem (include " personal factors and/or comorbidities that impact the POC) 62 year old gentleman with a long h/o NICM (EF20%, dilated LV, severe MR, Bi V ICD, ho VT), asthma/COPD, CKD, DM2, CECILIA, MDD, ho gastric bypass, admitted to TCU for poor endurance following LVAD Heartmate II placement on 6/19. His postoperative course was fairly uncomplicated    Precautions/Limitations fall precautions;immunosuppressed   Weight-Bearing Status - LUE full weight-bearing   Weight-Bearing Status - RUE full weight-bearing   Weight-Bearing Status - LLE full weight-bearing   Weight-Bearing Status - RLE full weight-bearing   General Info Comments Pt is IND with LVAD   Cognitive Status Examination   Orientation orientation to person, place and time   Level of Consciousness alert   Follows Commands and Answers Questions 100% of the time   Personal Safety and Judgment intact   Memory intact   Pain Assessment   Patient Currently in Pain No   Integumentary/Edema   Integumentary/Edema Comments mild BLE edema in lower leg   Posture    Posture Forward head position;Protracted shoulders   Range of Motion (ROM)   ROM Comment shoulder flexion limited to 90 deg d/t sternal precautions. BLEs demonsrtrate WFL for all AROM   Strength   Manual Muscle Testing Quick Adds MMT: Hip   MMT: Hip, Rehab Eval   Hip Flexion - Left Side (4/5) good, left   Hip Flexion - Right Side (4/5) good, right   Hip ABduction - Left Side (4/5) good, left   Hip ADduction - Left Side (5/5) normal,left   Hip ABduction - Right Side (4/5) good, right   Hip ADduction - Right Side (5/5) normal,right   MMT: Knee, Rehab Eval   Knee Flexion - Left Side (5/5) normal,left   Knee Extension - Left Side (4/5) good, left   Knee Flexion - Right Side (5/5) normal,right   Knee Extension - Right Side (4/5) good, right   MMT: Ankle, Rehab Eval   Ankle Dorsiflexion - Left Side 5/5) normal,left   Ankle Plantarflexion - Left Side (4-/5) good minus, left   Ankle Dorsiflexion - Right Side 5/5) normal,left   Ankle  Plantarflexion - Right Side (4-/5) good minus, left   ARC Assessment Only   Acute Rehab FIM See FIM scores for Mobility/ADL Assessment   Balance   Balance Comments normal sitting and static standing balance. Dynamic standing balance impaired d/t BLE weakness   Sensory Examination   Sensory Perception no deficits were identified   Coordination   Coordination no deficits were identified   Muscle Tone   Muscle Tone no deficits were identified   General Therapy Interventions   Planned Therapy Interventions balance training;bed mobility training;gait training;neuromuscular re-education;strengthening;stretching;transfer training;risk factor education;home program guidelines;progressive activity/exercise   Clinical Impression   Criteria for Skilled Therapeutic Intervention yes, treatment indicated   PT Diagnosis Decreased functional endurance and strength d/t LVAD placement 6/19   Influenced by the following impairments Decreased endurance, SOB, decreased BLE strength, impaired dynamic balance   Functional limitations due to impairments bed mobility, transfers, gait, stairs   Clinical Presentation Evolving/Changing   Clinical Presentation Rationale Per Pts, PMHx and current medical and functional status   Clinical Decision Making (Complexity) Moderate complexity   Therapy Frequency` daily  (90 min a day)   Predicted Duration of Therapy Intervention (days/wks) 7-10 days   Anticipated Discharge Disposition Home with Outpatient Therapy   Risk & Benefits of therapy have been explained Yes   Patient, Family & other staff in agreement with plan of care Yes   Clinical Impression Comments See care plan note for deatils   Total Evaluation Time   Total Evaluation Time (Minutes) 20

## 2017-07-01 NOTE — PLAN OF CARE
Problem: Goal/Outcome  Goal: Goal Outcome Summary  Outcome: No Change  Alert and oriented to self, place and time, repositioning ind in bed, neb treatment administered by RT at 0300, lung sounds clear , no sob noted. Continent of bladder, able to position the urinal and void and staff empties it. Able to reposition ind in bed, L vad dressing intact on left abd area, L vad parameters WDL compliant with CPAP, using the call llight consistently when he needs assist, slept most of the night, will continue POC.

## 2017-07-01 NOTE — PROGRESS NOTES
" 07/01/17 1000   Quick Adds   Type of Visit Initial Occupational Therapy Evaluation   Living Environment   Lives With grandchild(lidia);significant other   Living Arrangements house   Home Accessibility bed and bath are not on the first floor   Number of Stairs to Enter Home 2   Number of Stairs Within Home 8   Stair Railings at Home inside, present on right side  (railing on stairs going up, not down.)   Transportation Available family or friend will provide   Living Environment Comment bed and bath are upstairs. Tub shower, no grab bars. Pt. reports wife is working.   Self-Care   Dominant Hand left   Usual Activity Tolerance good   Current Activity Tolerance poor   Equipment Currently Used at Home none   Activity/Exercise/Self-Care Comment Pt reports performing [id number] steps a day in january but about a month ago pt was limited to 300 steps a day d/t poor endurance and SOB   Functional Level Prior   Ambulation 0-->independent   Transferring 0-->independent   Toileting 0-->independent   Bathing 0-->independent   Dressing 0-->independent   Eating 0-->independent   Communication 0-->understands/communicates without difficulty   Swallowing 0-->swallows foods/liquids without difficulty   Cognition 0 - no cognition issues reported   Fall history within last six months yes   Number of times patient has fallen within last six months 1  (possible hypotensive episode, sat on couch and ended om floo)   Which of the above functional risks had a recent onset or change? ambulation;transferring;toileting;bathing;dressing   Prior Functional Level Comment Independant with all ADLs and IADLs, taking care of grand daughter, grocery shopping and driving.       Present no   General Information   Onset of Illness/Injury or Date of Surgery - Date 06/19/17   Referring Physician Dr. Nabor Franco MD   Patient/Family Goals Statement \"To get strong and get endurance to get home to take care of baby- Graham.   Additional " "Occupational Profile Info/Pertinent History of Current Problem Per MD charting: \"Jim Willingham is a 62 year old man with a past medical history of CKD stage III, DM type 2, CECILIA, Obesity s/p gastric bypass, HTN, Asthma, COPD, NICM with EF 20% who was admitted 6/15 with progressive sob, ultimately admitted to heart failure team and underwent VAD (Heartmate II) placement 6/19.  Postoperatively admitted to CICU, extubated POD 2.  Post operative course relatively uncomplicated, non sustained arrhthymias,  WALTER, small left pleural effusion, decreased strength and activity tolerance. Admitted to ARU 6/30 for ongoing aggressive rehabilitation and medical management. \"   Precautions/Limitations sternal precautions  (LVAD)   Weight-Bearing Status - LUE (10 lb weight restriction)   Weight-Bearing Status - RUE (10 lb weight restriction)   Weight-Bearing Status - LLE full weight-bearing   Weight-Bearing Status - RLE full weight-bearing   Heart Disease Risk Factors Diabetes;Overweight;Lack of physical activity;High blood pressure;Medical history   General Observations OT: Pt. was sitting upright in chair when therapist arrived. Pt. is agreeable to therapy and mvery motivated to get back to PLOF in which he was raising his granddaughter    Cognitive Status Examination   Orientation orientation to person, place and time   Able to Follow Commands WNL/WFL   Memory intact   Attention No deficits were identified   Cognitive Comment OT: pt. reports prior word finding difficulties that have since cleared up.    Visual Perception   Visual Perception Comments WFL: able to read clock on the wall.   Sensory Examination   Sensory Comments OT: tingling in B hands secondary to thoracic outlet syndrome present prior to current problem.   Pain Assessment   Patient Currently in Pain Yes, see Vital Sign flowsheet   Integumentary/Edema   Integumentary/Edema (2 edema in B LE)   Integumentary/Edema Comments pitting edema   Range of Motion (ROM)   ROM " "Comment ROM is WFL within restrictions. Did not assess shoulder ROM, but reports pain and decreased ROM in R UE secondary to \"bone on bone\" arthritis.   Strength   Strength Comments Strength is WFL for BUE- tested at hand wrist and elbow due to restrictions.   Hand Strength   Left hand  (pounds) 82 pounds   Right hand  (pounds) 85 pounds   Muscle Tone Assessment   Muscle Tone Comments WFL   Coordination   Fine Motor Coordination (Pt. john mild incoordination in FMC.)   Left hand, Box and Block test (cubes transferred in 1 minute) 31   Right hand, Box and Block test (cubes transerred in 1 minute) 29   Coordination Comments OT: Pt. john pain in RUE when completing box and block, and fatigue in BUE during task. Also adalberto incoordination. Would benefit from therapy to address coordination as these skills are necessary for continuing use of LVAD.   ARC Assessment Only   Acute Rehab FIM See FIM scores for Mobility/ADL Assessment   Activities of Daily Living Analysis   Impairments Contributing to Impaired Activities of Daily Living coordination impaired;pain;post surgical precautions;ROM decreased;strength decreased   General Therapy Interventions   Planned Therapy Interventions ADL retraining;IADL retraining;balance training;fine motor coordination training;strengthening;transfer training;home program guidelines;progressive activity/exercise;risk factor education   Clinical Impression   Criteria for Skilled Therapeutic Interventions Met yes, treatment indicated   OT Diagnosis Decreased Indep with ADLs and IADLs   Influenced by the following impairments PtKyle lopez decreased balance, strength, endurance, activity tolerance, and coordination all which are impacting his ability to complete his ADLs and IADLs with I   Assessment of Occupational Performance 5 or more Performance Deficits   Identified Performance Deficits Dressing, bathing, toileting, grooming, transfers, meal prep, community transport, grocery shopping, " , [facility]   Clinical Decision Making (Complexity) High complexity   Therapy Frequency daily   Predicted Duration of Therapy Intervention (days/wks) 1 week   Anticipated Equipment Needs at Discharge shower chair;reacher   Anticipated Discharge Disposition Home with Home Therapy;Home with Outpatient Therapy   Risks and Benefits of Treatment have been explained. Yes   Patient, Family & other staff in agreement with plan of care Yes   Clinical Impression Comments Pt. will benefit from skilled OT to address ADLs, IADLs, endurance, FMC, home safety, and work simplification to reach his PLOF. Expected to D/C to either home health or oupatient OT in roughly 1 weeks. He needs this therapy to address above mentioned deficits.   Total Evaluation Time   Total Evaluation Time (Minutes) 30

## 2017-07-01 NOTE — PROGRESS NOTES
17 1700   Living Arrangements   Lives With grandchild(lidia);spouse   Living Arrangements house   Home Safety   Patient Feels Safe Living in Home? yes   Discharge Needs Assessment   Equipment Currently Used at Home none   Transportation Available car;family or friend will provide     Social Work: Initial Assessment with Discharge Plan    Patient Name: Jim Willingham  : 1955  Age: 62 year old  MRN: [id number]  Completed assessment with: patient  Admitted to ARU: 17    Presenting Information   Date of SW assessment: 17  Health Care Directive: none  Primary Health Care Agent: wife would be next-of-kin for decisons  Secondary Health Care Agent: n/a  Living Situation: with wife, aliyah and adopted 3 yr old great granddaughter  Previous Functional Status: independent  DME available: see above  Patient and family understanding of hospitalization: LVAD 17  Cultural/Language/Spiritual Considerations: speaks english, [employer] willa is very important    Physical Health  Reason for admission: cardiac s/p LVAD, physical deconditioning    Provider Information   Primary Care Physician: Dr. Delbert Sal[facility] at [facility]  LVAD SW: Leslie García  LVAD Coordinator: Rajani Abdi    Mental Health/Chemical Dependency:   Diagnosis: none  Alcohol/Tobacco/Narcotics: quit smoking , h/o ETOH abuse (rare use since  when wife entered  tx), remote h/o substance abuse  Support/Services in Place: n/a  Services Needed/Recommended: n/a  Sexuality/Intimacy: heterosexual    Support System  Marital Status: 2nd wife-Cate (works .9 as respiratory therapist at [employer]  Family support: son-Jim III ([place]), dtr-Barbra ([place]) and 4 stepsons-Faisal (lives w/pt & his mother), Max ([place]), Vaughn ([place]), Ottoniel ([place]),   Other support available: father lives in [place] and plans to come up to help/talk daily by phone; 2 brothers & 1 sister are supportive.    Community  Resources  Current in home services: none  Previous services: none    Financial/Employment/Education  Employment Status: disabled; previous respiratory therapist  Income Source: SSDI, wife's wages  Education: certifcation  Financial Concerns: none  Insurance: HP (under wife's insurance)    Discharge Plan   Patient and family discharge goal: return home with wife as caregiver  Provided education on discharge plan: discussed cardiac rehab  Patient agreeable to discharge plan: to be determined  Provided education and attained signature for Medicare IM and IRF Patient Rights and Privacy Information provided to patient : yes  Provided patient with [facility] Resources: na  Barriers to discharge: medical stability    Discharge Recommendations   Disposition: to be determined  Transportation Needs: wife can assist  Name of Transportation Company and Phone: n/a    Additional comments   D: See H&P for full medical background.  I: Met with patient to complete assessment.  A:  Patient doing well, thinks he may discharge by next weekend.  His wife will be main caregiver and staying with him 24/7 for first 30 days he is at home.  P:  SW will follow and assist as needed.    Please invite to Care Conference:  Cate (wife) [phone]      FEDERICO Gordon   Phone: [phone]  Pager: [phone]

## 2017-07-01 NOTE — PLAN OF CARE
Problem: Goal Outcome Summary  Goal: Goal Outcome Summary  Occupational Therapy Discharge Summary     Reason for therapy discharge:    Discharged to acute rehabilitation facility.     Progress towards therapy goal(s). See goals on Care Plan in [facility] electronic health record for goal details.  Goals partially met.  Barriers to achieving goals:   limited tolerance for therapy and discharge from facility.     Therapy recommendation(s):    Continued therapy is recommended.  Rationale/Recommendations:  continued OT at ARU to increase pt's (I) with ADL/IADLs.

## 2017-07-01 NOTE — PLAN OF CARE
Problem: Goal/Outcome  Goal: Goal Outcome Summary  Outcome: No Change  FOCUS/GOAL  Bladder management, Nutrition/Feeding/Swallowing precautions, Pain management, Medical management and Mobility     ASSESSMENT, INTERVENTIONS AND CONTINUING PLAN FOR GOAL:  Pt alert and oriented and using call light appropriately for assist. Able to transfer with CGA using walker. Pt's vitals and MAP stable. LVAD function within parameters. Prn ultram and prn dilaudid given for c/o breakthrough chest incision pain with good relief. Also on scheduled tylenol. Pt able to transfer LVAD from wall power to battery power with nurse only watching him. Dressing over LVAD driveline site, CDI. Sternal incision and chest tubes sites, healing well and CRISPIN. Continent of bladder, using the urinal with set-up. Pt ate breakfast but didn't want to eat lunch because he said he ate breakfast a little late and is still full.

## 2017-07-01 NOTE — PLAN OF CARE
Problem: Goal/Outcome  Goal: Goal Outcome Summary  PT: Evaluation complete and treatment initiated. Pt able to amb 26 ft with FWW and SBA but limited d/t fatigue and SOB. Pt is justus to transfer sit<>stand with CGA and no AD. Pt performed car transfer and 4 stairs with CGA and no AD. Pt mostly limited in functional mobility d/t CV endurance. Anticipate LOS to be 7-10 days.

## 2017-07-01 NOTE — PROGRESS NOTES
"PM&R PROGRESS NOTE     Patient seen and examined today. She/He was observed in therapy session as well. Coordinated with team.      HPI/ACTIVE ISSUES   Jim Willingham is a 62 year old male, admitted to [facility] ARU on 6/30/2017  He is a 62 year old gentleman with a long h/o NICM (EF20%, dilated LV, severe MR, Bi V ICD, ho VT), asthma/COPD, CKD, DM2, CECILIA, MDD, ho gastric bypass, admitted to TCU for poor endurance following LVAD Heartmate II placement on 6/19. His postoperative course was fairly uncomplicated   Main issues today are as follows  He has passed the LVAD test, and feels comfortable. RN to assess the carry over of the teaching   Notes some lightheadedness on standing, on metoprolol. Will monitor closely     Functionally, Jim Willingham is participating in the therapies in a motivated fashion. He is making good progress. He is requiring max assist with LB dressing continue rehab     Physical exam    Vital signs:  Temp: 97.6  F (36.4  C) Temp src: Oral BP: 92/75 (MAP 86) Pulse: 63   Resp: 18 SpO2: 96 % O2 Device: None (Room air)   Height: 172.7 cm (5' 8\") Weight: 113 kg (249 lb 3.2 oz)  Estimated body mass index is 37.89 kg/(m^2) as calculated from the following:    Height as of this encounter: 1.727 m (5' 8\").    Weight as of this encounter: 113 kg (249 lb 3.2 oz).  HEENT NC AT PRTL EOM good   Neck supple  LVAD hum   Lungs CTA  Abdomen  benign BS positive NT NR   LE no edema            REVIEWED THE MEDICATIONS BELOW   Current Facility-Administered Medications   Medication     warfarin (COUMADIN) tablet 5 mg     Patient is already receiving anticoagulation with heparin, enoxaparin (LOVENOX), warfarin (COUMADIN)  or other anticoagulant medication     Warfarin Therapy Reminder (Check START DATE - warfarin may be starting in the FUTURE)     acetaminophen (TYLENOL) tablet 650 mg     aspirin chewable tablet 81 mg     albuterol (PROAIR HFA/PROVENTIL HFA/VENTOLIN HFA) Inhaler 2 puff     citalopram (celeXA) tablet 20 mg "     cyanocobalamin (VITAMIN B12) injection 1,000 mcg     fluticasone-vilanterol (BREO ELLIPTA) 200-25 MCG/INH oral inhaler 1 puff     HYDROmorphone (DILAUDID) tablet 2-4 mg     ipratropium - albuterol 0.5 mg/2.5 mg/3 mL (DUONEB) neb solution 3 mL     metFORMIN (GLUCOPHAGE) half-tab 250 mg     metoprolol (LOPRESSOR) tablet 25 mg     montelukast (SINGULAIR) tablet 10 mg     pantoprazole (PROTONIX) EC tablet 40 mg     polyethylene glycol (MIRALAX/GLYCOLAX) Packet 17 g     potassium chloride SA (K-DUR/KLOR-CON M) CR tablet 20 mEq     senna-docusate (SENOKOT-S;PERICOLACE) 8.6-50 MG per tablet 2 tablet     traMADol (ULTRAM) tablet 100 mg     traZODone (DESYREL) quarter-tab 50 mg     zinc sulfate (ZINCATE) capsule 220 mg     umeclidinium (INCRUSE ELLIPTA) 62.5 MCG/INH oral inhaler 1 puff     naloxone (NARCAN) injection 0.1-0.4 mg     bumetanide (BUMEX) tablet 1 mg     glucose 40 % gel 15-30 g    Or     dextrose 50 % injection 25-50 mL    Or     glucagon injection 1 mg

## 2017-07-01 NOTE — PLAN OF CARE
Problem: Goal/Outcome  Goal: Goal Outcome Summary  OT: Initial Eval completed and POC established. Pt. Demos deficits in endurance, activity tolerance, balance, coordiantion which are impacting his ability to complete ADLs and IADLs. Currently pt. Demos SBA for g/h at sink, CGA UB dress, and Max A LB dress, SBA toilet transfer. Goal is to progress pt. To I with ADLs and IADLs. His main focus is on returning to being a caregiver to grand daughter. Anticipated length of stay is a week to 10 days with d/c to home health or outpatient therapy.

## 2017-07-01 NOTE — PROGRESS NOTES
Pt feels well overall  He noted transient light-headedness with PT this am  No chest pain, SOB.  LE edema seems unchanged, per Pt    Afebrile  BP 90s/70s  Wt unchanged  BG 90s    Alert, fully oriented, in good spirits  Lungs clear  CV rrr  Abd soft  Trace ankle edema B    Cr 1.39 ( 1.57)    INR 2.66      Assessment    S/p LVAD (destination therapy) for NICM. Pt is doing well  Wt stable (on reduced dose of Bumex 1 mg bid) mild transient orthostatic dizziness.    Post op SVT/Afib, stable on Metoprolol    CKD, Cr improved, appears at baseline on reduced dose of Bumex    DM BG very well controlled, on reduced dose of Metformin,. ? If still needed.    Asthma, stable on current tx    CECILIA on CPAP    Plan  Continue to monitor daily wts  Repeat BMP on 7/3/17  Monitor BG, consider d/c of metformin if BG remain less than 100.  No need for SSI coverage

## 2017-07-01 NOTE — PROGRESS NOTES
"CLINICAL NUTRITION SERVICES - ASSESSMENT NOTE     Nutrition Prescription    RECOMMENDATIONS FOR MDs/PROVIDERS TO ORDER:  None today     Malnutrition Status:    Patient does not meet criteria necessary for diagnosing malnutrition but is at risk for malnutrition    Recommendations already ordered by Registered Dietitian (RD):  1. Ensure Clear (berry) TID with meals   2. Pro-stat sugar free (citrus splash) + 4oz diet lemonade TID between meals     Future/Additional Recommendations:  1. If PO does not improve, consider ordering calorie counts to further quantify intakes  2. Continue regular diet at this time due to report of poor appetite with decreased intakes. Monitor glycemic control and fluid status, with possible need to resume therapeutic diet order (high consistent carbohydrate diet and/or 2 gm sodium restricted diet).      REASON FOR ASSESSMENT  Jim Willingham is a/an 62 year old male assessed by the dietitian for Provider Order - impaired oral intake, discuss supplements, consider gucci counts     NUTRITION HISTORY  Jim reports his appetite is \"non existent\". At baseline, was consuming 4-5 small meals per day. Over the past 1 1/2 years, reports sense of taste and smell has declined and currently, \"all food taste the same\". Is able to find items he thinks he will like on the menus, however lack of taste and appetite are hindering intakes.     CURRENT NUTRITION ORDERS  Diet: Regular   Intake/Tolerance: Ordered cream of wheat and fresh fruit cup for breakfast today, which he tolerated well and provided 288 kcal, 4 gm protein.     LABS  Labs reviewed  HgbA1c 6.7 - 5/24/17    MEDICATIONS  Medications reviewed    ANTHROPOMETRICS  Height: 172.7 cm (5' 8\")  Most Recent Weight: 113 kg (249 lb 3.2 oz)    IBW: 70 kg  BMI: Obesity Grade II BMI 35-39.9  Weight History: No significant weight changes noted per EPIC review. Difficult to assess true weight loss with changes in fluid status, post-op status, and diuresis.   Wt " Readings from Last 10 Encounters:   07/01/17 113 kg (249 lb 3.2 oz)   06/30/17 113.5 kg (250 lb 3.2 oz)   06/13/17 118.5 kg (261 lb 3.9 oz)   06/08/17 116.1 kg (255 lb 14.4 oz)   05/26/17 117.3 kg (258 lb 8 oz)   05/12/17 119 kg (262 lb 4.8 oz)     Dosing Weight: 80 kg (adjusted for >120% IBW)    ASSESSED NUTRITION NEEDS  Estimated Energy Needs: 4374-0077 kcals/day (20 - 25 kcals/kg)  Justification: Obese  Estimated Protein Needs: 120-160 grams protein/day (1.5 - 2 grams of pro/kg)  Justification: Increased needs s/p LVAD placement   Estimated Fluid Needs:  1 mL/kcal  Justification: Maintenance or Per provider pending fluid status    PHYSICAL FINDINGS  See malnutrition section below.  S/p LVAD placement 6/19    MALNUTRITION  % Intake: Suspect </= 50% for >/= 5 days (severe)  % Weight Loss: None noted  Subcutaneous Fat Loss: None observed  Muscle Loss: None observed  Fluid Accumulation/Edema: Does not meet criteria   Malnutrition Diagnosis: Patient does not meet two of the above criteria necessary for diagnosing malnutrition but is at risk for malnutrition    NUTRITION DIAGNOSIS  Predicted inadequate nutrient intake (protien/energy) related to increased needs post-op with reliance on PO to meet 100% nutrition needs as evidenced by reported decreased appetite/intakes with potential for further decline surrounding admission.      INTERVENTIONS  Implementation  Discussed nutrition history and PO since admission with patient. Discussed room service menus and increased needs to promote healing. Discussed oral nutrition supplements and ordered as above. Encouraged adequate PO of food and fluids.  Nutrition education: Provided room service handout depicting protein amounts in foods and beverages on the room service menu. Wrote total daily protein goal on handout (120 gm/d).      Goals  Patient to consume % of nutritionally adequate meal trays TID, or the equivalent with supplements/snacks.      Monitoring/Evaluation  Progress toward goals will be monitored and evaluated per protocol.    Kateryna Johnston RD, LD  Unit Pager: [phone]  Weekend/On-call Pager: [phone]

## 2017-07-02 LAB
GLUCOSE BLDC GLUCOMTR-MCNC: 104 MG/DL (ref 70–99)
GLUCOSE BLDC GLUCOMTR-MCNC: 84 MG/DL (ref 70–99)
GLUCOSE BLDC GLUCOMTR-MCNC: 90 MG/DL (ref 70–99)
GLUCOSE BLDC GLUCOMTR-MCNC: 91 MG/DL (ref 70–99)
GLUCOSE BLDC GLUCOMTR-MCNC: 97 MG/DL (ref 70–99)
INR PPP: 2.85 (ref 0.86–1.14)

## 2017-07-02 PROCEDURE — 25000132 ZZH RX MED GY IP 250 OP 250 PS 637: Performed by: PHYSICAL MEDICINE & REHABILITATION

## 2017-07-02 PROCEDURE — 12800006 ZZH R&B REHAB

## 2017-07-02 PROCEDURE — 00000146 ZZHCL STATISTIC GLUCOSE BY METER IP

## 2017-07-02 PROCEDURE — 40000275 ZZH STATISTIC RCP TIME EA 10 MIN

## 2017-07-02 PROCEDURE — 97110 THERAPEUTIC EXERCISES: CPT | Mod: GO | Performed by: OCCUPATIONAL THERAPIST

## 2017-07-02 PROCEDURE — 36415 COLL VENOUS BLD VENIPUNCTURE: CPT | Performed by: PHYSICIAN ASSISTANT

## 2017-07-02 PROCEDURE — 25000125 ZZHC RX 250: Performed by: PHYSICAL MEDICINE & REHABILITATION

## 2017-07-02 PROCEDURE — 40000133 ZZH STATISTIC OT WARD VISIT: Performed by: OCCUPATIONAL THERAPIST

## 2017-07-02 PROCEDURE — 97110 THERAPEUTIC EXERCISES: CPT | Mod: GP | Performed by: PHYSICAL THERAPIST

## 2017-07-02 PROCEDURE — 40000193 ZZH STATISTIC PT WARD VISIT: Performed by: PHYSICAL THERAPIST

## 2017-07-02 PROCEDURE — 99207 ZZC CDG-MDM COMPONENT: MEETS MODERATE - UP CODED: CPT | Performed by: INTERNAL MEDICINE

## 2017-07-02 PROCEDURE — 99232 SBSQ HOSP IP/OBS MODERATE 35: CPT | Performed by: INTERNAL MEDICINE

## 2017-07-02 PROCEDURE — 97530 THERAPEUTIC ACTIVITIES: CPT | Mod: GP | Performed by: PHYSICAL THERAPIST

## 2017-07-02 PROCEDURE — 97116 GAIT TRAINING THERAPY: CPT | Mod: GP | Performed by: PHYSICAL THERAPIST

## 2017-07-02 PROCEDURE — 85610 PROTHROMBIN TIME: CPT | Performed by: PHYSICIAN ASSISTANT

## 2017-07-02 PROCEDURE — 94640 AIRWAY INHALATION TREATMENT: CPT | Mod: 76

## 2017-07-02 PROCEDURE — 97535 SELF CARE MNGMENT TRAINING: CPT | Mod: GO | Performed by: OCCUPATIONAL THERAPIST

## 2017-07-02 PROCEDURE — 94640 AIRWAY INHALATION TREATMENT: CPT

## 2017-07-02 PROCEDURE — 25000132 ZZH RX MED GY IP 250 OP 250 PS 637: Performed by: HOSPITALIST

## 2017-07-02 RX ADMIN — CITALOPRAM HYDROBROMIDE 20 MG: 10 TABLET ORAL at 08:15

## 2017-07-02 RX ADMIN — PANTOPRAZOLE SODIUM 40 MG: 40 TABLET, DELAYED RELEASE ORAL at 08:15

## 2017-07-02 RX ADMIN — ACETAMINOPHEN 650 MG: 325 TABLET, FILM COATED ORAL at 21:32

## 2017-07-02 RX ADMIN — UMECLIDINIUM 1 PUFF: 62.5 AEROSOL, POWDER ORAL at 08:18

## 2017-07-02 RX ADMIN — ASPIRIN 81 MG 81 MG: 81 TABLET ORAL at 08:15

## 2017-07-02 RX ADMIN — HYDROMORPHONE HYDROCHLORIDE 4 MG: 2 TABLET ORAL at 08:10

## 2017-07-02 RX ADMIN — IPRATROPIUM BROMIDE AND ALBUTEROL SULFATE 3 ML: .5; 3 SOLUTION RESPIRATORY (INHALATION) at 15:13

## 2017-07-02 RX ADMIN — HYDROMORPHONE HYDROCHLORIDE 4 MG: 2 TABLET ORAL at 05:06

## 2017-07-02 RX ADMIN — SENNOSIDES AND DOCUSATE SODIUM 2 TABLET: 8.6; 5 TABLET ORAL at 21:32

## 2017-07-02 RX ADMIN — POTASSIUM CHLORIDE 20 MEQ: 750 TABLET, FILM COATED, EXTENDED RELEASE ORAL at 21:32

## 2017-07-02 RX ADMIN — FLUTICASONE FUROATE AND VILANTEROL TRIFENATATE 1 PUFF: 200; 25 POWDER RESPIRATORY (INHALATION) at 08:18

## 2017-07-02 RX ADMIN — HYDROMORPHONE HYDROCHLORIDE 4 MG: 2 TABLET ORAL at 11:10

## 2017-07-02 RX ADMIN — HYDROMORPHONE HYDROCHLORIDE 2 MG: 2 TABLET ORAL at 16:03

## 2017-07-02 RX ADMIN — ACETAMINOPHEN 650 MG: 325 TABLET, FILM COATED ORAL at 05:06

## 2017-07-02 RX ADMIN — MONTELUKAST SODIUM 10 MG: 10 TABLET, FILM COATED ORAL at 21:32

## 2017-07-02 RX ADMIN — METOPROLOL TARTRATE 25 MG: 25 TABLET, FILM COATED ORAL at 08:15

## 2017-07-02 RX ADMIN — METOPROLOL TARTRATE 25 MG: 25 TABLET, FILM COATED ORAL at 21:36

## 2017-07-02 RX ADMIN — IPRATROPIUM BROMIDE AND ALBUTEROL SULFATE 3 ML: .5; 3 SOLUTION RESPIRATORY (INHALATION) at 07:25

## 2017-07-02 RX ADMIN — Medication 250 MG: at 08:26

## 2017-07-02 RX ADMIN — HYDROMORPHONE HYDROCHLORIDE 4 MG: 2 TABLET ORAL at 21:31

## 2017-07-02 RX ADMIN — BUMETANIDE 1 MG: 1 TABLET ORAL at 15:52

## 2017-07-02 RX ADMIN — TRAMADOL HYDROCHLORIDE 100 MG: 50 TABLET, COATED ORAL at 02:12

## 2017-07-02 RX ADMIN — TRAMADOL HYDROCHLORIDE 100 MG: 50 TABLET, COATED ORAL at 08:11

## 2017-07-02 RX ADMIN — IPRATROPIUM BROMIDE AND ALBUTEROL SULFATE 3 ML: .5; 3 SOLUTION RESPIRATORY (INHALATION) at 12:55

## 2017-07-02 RX ADMIN — SENNOSIDES AND DOCUSATE SODIUM 2 TABLET: 8.6; 5 TABLET ORAL at 08:15

## 2017-07-02 RX ADMIN — ZINC SULFATE CAP 220 MG (50 MG ELEMENTAL ZN) 220 MG: 220 (50 ZN) CAP at 08:15

## 2017-07-02 RX ADMIN — IPRATROPIUM BROMIDE AND ALBUTEROL SULFATE 3 ML: .5; 3 SOLUTION RESPIRATORY (INHALATION) at 20:47

## 2017-07-02 RX ADMIN — Medication 50 MG: at 21:37

## 2017-07-02 RX ADMIN — ZINC SULFATE CAP 220 MG (50 MG ELEMENTAL ZN) 220 MG: 220 (50 ZN) CAP at 21:32

## 2017-07-02 RX ADMIN — WARFARIN SODIUM 4 MG: 3 TABLET ORAL at 17:43

## 2017-07-02 RX ADMIN — ACETAMINOPHEN 650 MG: 325 TABLET, FILM COATED ORAL at 15:52

## 2017-07-02 RX ADMIN — Medication 250 MG: at 17:43

## 2017-07-02 RX ADMIN — BUMETANIDE 1 MG: 1 TABLET ORAL at 08:15

## 2017-07-02 RX ADMIN — ACETAMINOPHEN 650 MG: 325 TABLET, FILM COATED ORAL at 10:27

## 2017-07-02 RX ADMIN — POTASSIUM CHLORIDE 20 MEQ: 750 TABLET, FILM COATED, EXTENDED RELEASE ORAL at 08:15

## 2017-07-02 RX ADMIN — TRAMADOL HYDROCHLORIDE 100 MG: 50 TABLET, COATED ORAL at 14:25

## 2017-07-02 RX ADMIN — HYDROMORPHONE HYDROCHLORIDE 4 MG: 2 TABLET ORAL at 02:05

## 2017-07-02 RX ADMIN — HYDROMORPHONE HYDROCHLORIDE 2 MG: 2 TABLET ORAL at 14:25

## 2017-07-02 RX ADMIN — HYDROMORPHONE HYDROCHLORIDE 4 MG: 2 TABLET ORAL at 18:25

## 2017-07-02 NOTE — PLAN OF CARE
Problem: Goal/Outcome  Goal: Goal Outcome Summary  PT: Pt highly motivated to increase ambulation distance and was able to ambulate 100 feet without stopping today with FWW and SBA. Pt reported that he would like to be woken up for pain medication overnight, as pain can limit his participation when he does not get it on schedule.

## 2017-07-02 NOTE — PLAN OF CARE
Problem: Goal/Outcome  Goal: Goal Outcome Summary  Outcome: No Change  FOCUS/GOAL  Bowel management, Bladder management, Pain management, Wound care management and Medical management     ASSESSMENT, INTERVENTIONS AND CONTINUING PLAN FOR GOAL:  Pt used call light appropriately. Dilaudid given x2 for chest incisional pain with some relief.   LVAD numbers WNL. MAP 74 and 75. Pt able to switch LVAD power source from battery to wall.   LVAD drive line exit site has scant amount of bleeding, new dressing applied. Two out of four old chest tube sites had small drainage, new dressings applied.   Ambulated to bathroom with SBA and walker, had bm x1. Pt used urinal independently, staff emptied urinal.    and 104  Will continue to assess pain and offer pain medication.

## 2017-07-02 NOTE — PLAN OF CARE
Problem: Goal/Outcome  Goal: Goal Outcome Summary  FOCUS/GOAL  Bowel management, Bladder management, Pain management, Equipment/Assistive devices and Safety management     ASSESSMENT, INTERVENTIONS AND CONTINUING PLAN FOR GOAL:  Pt is alert and oriented. Continent of bladder, voiding spontaneously in urinal. No BM, passing flatus. Pain managed with Dilaudid and Tramadol with adequate relief. LVAD parameters within normal limits, dressing is c/d/i. CPAP on throughout the night. Uses call light appropriately, able to make needs known. Will continue with POC.

## 2017-07-02 NOTE — PROGRESS NOTES
"PM&R PROGRESS NOTE     Patient seen and examined today. He was observed in therapy session as well. Coordinated with team.      HPI/ACTIVE ISSUES   Jim Willingham is a 62 year old male, admitted to [facility] ARU on 6/30/2017 with a long h/o NICM (EF20%, dilated LV, severe MR, Bi V ICD, ho VT), asthma/COPD, CKD, DM2, CECILIA, MDD, ho gastric bypass, admitted to TCU for poor endurance following LVAD Heartmate II placement on 6/19. His postoperative course was fairly uncomplicated     Main issues today are as follows  Mild increase in LE swelling. No weights so far today. Will add mirta socks to contain the swelling as he ambulates and works with therapies. No change in symptoms. On bumex      Functionally, Jim Willingham is participating in the therapies in a motivated fashion. He is making good progress. Continue intensity as prescribed     Physical exam    Vital signs:  Temp: 97.3  F (36.3  C) Temp src: Oral BP: (!) 83/74 (MAP 77) Pulse: 66   Resp: 18 SpO2: 95 % O2 Device: None (Room air)   Height: 172.7 cm (5' 8\") Weight: 112 kg (247 lb)  Estimated body mass index is 37.56 kg/(m^2) as calculated from the following:    Height as of this encounter: 1.727 m (5' 8\").    Weight as of this encounter: 112 kg (247 lb).  HEENT NC AT PRTL EOM good   Sitting in EOB   Cognitively intact   Speech and comprehension functional   Neck supple  LVAD hum   Lungs CTA  Abdomen  benign BS positive NT NR   LE bilateral LE edema            REVIEWED THE MEDICATIONS BELOW   Current Facility-Administered Medications   Medication     warfarin (COUMADIN) tablet 4 mg     ipratropium - albuterol 0.5 mg/2.5 mg/3 mL (DUONEB) neb solution 3 mL     Patient is already receiving anticoagulation with heparin, enoxaparin (LOVENOX), warfarin (COUMADIN)  or other anticoagulant medication     Warfarin Therapy Reminder (Check START DATE - warfarin may be starting in the FUTURE)     acetaminophen (TYLENOL) tablet 650 mg     aspirin chewable tablet 81 mg     albuterol " (PROAIR HFA/PROVENTIL HFA/VENTOLIN HFA) Inhaler 2 puff     citalopram (celeXA) tablet 20 mg     cyanocobalamin (VITAMIN B12) injection 1,000 mcg     fluticasone-vilanterol (BREO ELLIPTA) 200-25 MCG/INH oral inhaler 1 puff     HYDROmorphone (DILAUDID) tablet 2-4 mg     ipratropium - albuterol 0.5 mg/2.5 mg/3 mL (DUONEB) neb solution 3 mL     metFORMIN (GLUCOPHAGE) half-tab 250 mg     metoprolol (LOPRESSOR) tablet 25 mg     montelukast (SINGULAIR) tablet 10 mg     pantoprazole (PROTONIX) EC tablet 40 mg     polyethylene glycol (MIRALAX/GLYCOLAX) Packet 17 g     potassium chloride SA (K-DUR/KLOR-CON M) CR tablet 20 mEq     senna-docusate (SENOKOT-S;PERICOLACE) 8.6-50 MG per tablet 2 tablet     traMADol (ULTRAM) tablet 100 mg     traZODone (DESYREL) tablet 50 mg     zinc sulfate (ZINCATE) capsule 220 mg     umeclidinium (INCRUSE ELLIPTA) 62.5 MCG/INH oral inhaler 1 puff     naloxone (NARCAN) injection 0.1-0.4 mg     bumetanide (BUMEX) tablet 1 mg     glucose 40 % gel 15-30 g    Or     dextrose 50 % injection 25-50 mL    Or     glucagon injection 1 mg

## 2017-07-03 ENCOUNTER — DOCUMENTATION ONLY (OUTPATIENT)
Dept: CARDIOLOGY | Facility: CLINIC | Age: 62
End: 2017-07-03

## 2017-07-03 LAB
ANION GAP SERPL CALCULATED.3IONS-SCNC: 9 MMOL/L (ref 3–14)
BUN SERPL-MCNC: 28 MG/DL (ref 7–30)
CALCIUM SERPL-MCNC: 8.4 MG/DL (ref 8.5–10.1)
CHLORIDE SERPL-SCNC: 99 MMOL/L (ref 94–109)
CO2 SERPL-SCNC: 26 MMOL/L (ref 20–32)
CREAT SERPL-MCNC: 1.39 MG/DL (ref 0.66–1.25)
ERYTHROCYTE [DISTWIDTH] IN BLOOD BY AUTOMATED COUNT: 14.2 % (ref 10–15)
GFR SERPL CREATININE-BSD FRML MDRD: 52 ML/MIN/1.7M2
GLUCOSE BLDC GLUCOMTR-MCNC: 106 MG/DL (ref 70–99)
GLUCOSE BLDC GLUCOMTR-MCNC: 89 MG/DL (ref 70–99)
GLUCOSE BLDC GLUCOMTR-MCNC: 92 MG/DL (ref 70–99)
GLUCOSE BLDC GLUCOMTR-MCNC: 95 MG/DL (ref 70–99)
GLUCOSE BLDC GLUCOMTR-MCNC: 99 MG/DL (ref 70–99)
GLUCOSE SERPL-MCNC: 95 MG/DL (ref 70–99)
HCT VFR BLD AUTO: 27.3 % (ref 40–53)
HGB BLD-MCNC: 8.6 G/DL (ref 13.3–17.7)
INR PPP: 2.96 (ref 0.86–1.14)
MCH RBC QN AUTO: 28.6 PG (ref 26.5–33)
MCHC RBC AUTO-ENTMCNC: 31.5 G/DL (ref 31.5–36.5)
MCV RBC AUTO: 91 FL (ref 78–100)
PLATELET # BLD AUTO: 419 10E9/L (ref 150–450)
POTASSIUM SERPL-SCNC: 4.3 MMOL/L (ref 3.4–5.3)
RBC # BLD AUTO: 3.01 10E12/L (ref 4.4–5.9)
SODIUM SERPL-SCNC: 134 MMOL/L (ref 133–144)
WBC # BLD AUTO: 8.4 10E9/L (ref 4–11)

## 2017-07-03 PROCEDURE — 80048 BASIC METABOLIC PNL TOTAL CA: CPT | Performed by: PHYSICIAN ASSISTANT

## 2017-07-03 PROCEDURE — 36415 COLL VENOUS BLD VENIPUNCTURE: CPT | Performed by: PHYSICIAN ASSISTANT

## 2017-07-03 PROCEDURE — 97116 GAIT TRAINING THERAPY: CPT | Mod: GP | Performed by: STUDENT IN AN ORGANIZED HEALTH CARE EDUCATION/TRAINING PROGRAM

## 2017-07-03 PROCEDURE — 40000193 ZZH STATISTIC PT WARD VISIT: Performed by: STUDENT IN AN ORGANIZED HEALTH CARE EDUCATION/TRAINING PROGRAM

## 2017-07-03 PROCEDURE — 97535 SELF CARE MNGMENT TRAINING: CPT | Mod: GO | Performed by: OCCUPATIONAL THERAPIST

## 2017-07-03 PROCEDURE — 25000132 ZZH RX MED GY IP 250 OP 250 PS 637: Mod: GY | Performed by: PHYSICAL MEDICINE & REHABILITATION

## 2017-07-03 PROCEDURE — 97110 THERAPEUTIC EXERCISES: CPT | Mod: GP | Performed by: STUDENT IN AN ORGANIZED HEALTH CARE EDUCATION/TRAINING PROGRAM

## 2017-07-03 PROCEDURE — 12800006 ZZH R&B REHAB

## 2017-07-03 PROCEDURE — 94640 AIRWAY INHALATION TREATMENT: CPT | Mod: 76

## 2017-07-03 PROCEDURE — 85027 COMPLETE CBC AUTOMATED: CPT | Performed by: PHYSICIAN ASSISTANT

## 2017-07-03 PROCEDURE — 40000133 ZZH STATISTIC OT WARD VISIT: Performed by: OCCUPATIONAL THERAPIST

## 2017-07-03 PROCEDURE — 25000125 ZZHC RX 250: Performed by: PHYSICAL MEDICINE & REHABILITATION

## 2017-07-03 PROCEDURE — A9270 NON-COVERED ITEM OR SERVICE: HCPCS | Mod: GY | Performed by: PHYSICAL MEDICINE & REHABILITATION

## 2017-07-03 PROCEDURE — 85610 PROTHROMBIN TIME: CPT | Performed by: PHYSICIAN ASSISTANT

## 2017-07-03 PROCEDURE — 25000132 ZZH RX MED GY IP 250 OP 250 PS 637: Performed by: HOSPITALIST

## 2017-07-03 PROCEDURE — 40000275 ZZH STATISTIC RCP TIME EA 10 MIN

## 2017-07-03 PROCEDURE — A9270 NON-COVERED ITEM OR SERVICE: HCPCS | Mod: GY | Performed by: HOSPITALIST

## 2017-07-03 PROCEDURE — 00000146 ZZHCL STATISTIC GLUCOSE BY METER IP

## 2017-07-03 PROCEDURE — 97530 THERAPEUTIC ACTIVITIES: CPT | Mod: GP | Performed by: STUDENT IN AN ORGANIZED HEALTH CARE EDUCATION/TRAINING PROGRAM

## 2017-07-03 RX ORDER — WARFARIN SODIUM 3 MG/1
3 TABLET ORAL
Status: COMPLETED | OUTPATIENT
Start: 2017-07-03 | End: 2017-07-03

## 2017-07-03 RX ADMIN — HYDROMORPHONE HYDROCHLORIDE 4 MG: 2 TABLET ORAL at 09:23

## 2017-07-03 RX ADMIN — METOPROLOL TARTRATE 25 MG: 25 TABLET, FILM COATED ORAL at 21:18

## 2017-07-03 RX ADMIN — POTASSIUM CHLORIDE 20 MEQ: 750 TABLET, FILM COATED, EXTENDED RELEASE ORAL at 21:18

## 2017-07-03 RX ADMIN — BUMETANIDE 1 MG: 1 TABLET ORAL at 15:40

## 2017-07-03 RX ADMIN — UMECLIDINIUM 1 PUFF: 62.5 AEROSOL, POWDER ORAL at 08:17

## 2017-07-03 RX ADMIN — ASPIRIN 81 MG 81 MG: 81 TABLET ORAL at 08:17

## 2017-07-03 RX ADMIN — HYDROMORPHONE HYDROCHLORIDE 4 MG: 2 TABLET ORAL at 03:29

## 2017-07-03 RX ADMIN — SENNOSIDES AND DOCUSATE SODIUM 2 TABLET: 8.6; 5 TABLET ORAL at 21:18

## 2017-07-03 RX ADMIN — TRAMADOL HYDROCHLORIDE 100 MG: 50 TABLET, COATED ORAL at 08:09

## 2017-07-03 RX ADMIN — HYDROMORPHONE HYDROCHLORIDE 4 MG: 2 TABLET ORAL at 21:50

## 2017-07-03 RX ADMIN — Medication 250 MG: at 18:50

## 2017-07-03 RX ADMIN — IPRATROPIUM BROMIDE AND ALBUTEROL SULFATE 3 ML: .5; 3 SOLUTION RESPIRATORY (INHALATION) at 06:45

## 2017-07-03 RX ADMIN — ACETAMINOPHEN 650 MG: 325 TABLET, FILM COATED ORAL at 03:29

## 2017-07-03 RX ADMIN — CITALOPRAM HYDROBROMIDE 20 MG: 10 TABLET ORAL at 08:16

## 2017-07-03 RX ADMIN — TRAMADOL HYDROCHLORIDE 100 MG: 50 TABLET, COATED ORAL at 14:22

## 2017-07-03 RX ADMIN — HYDROMORPHONE HYDROCHLORIDE 4 MG: 2 TABLET ORAL at 12:27

## 2017-07-03 RX ADMIN — ACETAMINOPHEN 650 MG: 325 TABLET, FILM COATED ORAL at 09:23

## 2017-07-03 RX ADMIN — HYDROMORPHONE HYDROCHLORIDE 2 MG: 2 TABLET ORAL at 15:40

## 2017-07-03 RX ADMIN — FLUTICASONE FUROATE AND VILANTEROL TRIFENATATE 1 PUFF: 200; 25 POWDER RESPIRATORY (INHALATION) at 08:17

## 2017-07-03 RX ADMIN — METOPROLOL TARTRATE 25 MG: 25 TABLET, FILM COATED ORAL at 08:16

## 2017-07-03 RX ADMIN — TRAMADOL HYDROCHLORIDE 100 MG: 50 TABLET, COATED ORAL at 21:19

## 2017-07-03 RX ADMIN — IPRATROPIUM BROMIDE AND ALBUTEROL SULFATE 3 ML: .5; 3 SOLUTION RESPIRATORY (INHALATION) at 19:57

## 2017-07-03 RX ADMIN — PANTOPRAZOLE SODIUM 40 MG: 40 TABLET, DELAYED RELEASE ORAL at 08:16

## 2017-07-03 RX ADMIN — BUMETANIDE 1 MG: 1 TABLET ORAL at 08:16

## 2017-07-03 RX ADMIN — IPRATROPIUM BROMIDE AND ALBUTEROL SULFATE 3 ML: .5; 3 SOLUTION RESPIRATORY (INHALATION) at 11:47

## 2017-07-03 RX ADMIN — ZINC SULFATE CAP 220 MG (50 MG ELEMENTAL ZN) 220 MG: 220 (50 ZN) CAP at 08:16

## 2017-07-03 RX ADMIN — HYDROMORPHONE HYDROCHLORIDE 4 MG: 2 TABLET ORAL at 18:59

## 2017-07-03 RX ADMIN — Medication 50 MG: at 21:19

## 2017-07-03 RX ADMIN — POTASSIUM CHLORIDE 20 MEQ: 750 TABLET, FILM COATED, EXTENDED RELEASE ORAL at 08:17

## 2017-07-03 RX ADMIN — HYDROMORPHONE HYDROCHLORIDE 4 MG: 2 TABLET ORAL at 06:27

## 2017-07-03 RX ADMIN — MONTELUKAST SODIUM 10 MG: 10 TABLET, FILM COATED ORAL at 21:18

## 2017-07-03 RX ADMIN — HYDROMORPHONE HYDROCHLORIDE 4 MG: 2 TABLET ORAL at 00:29

## 2017-07-03 RX ADMIN — WARFARIN SODIUM 3 MG: 3 TABLET ORAL at 18:50

## 2017-07-03 RX ADMIN — Medication 250 MG: at 08:17

## 2017-07-03 RX ADMIN — SENNOSIDES AND DOCUSATE SODIUM 2 TABLET: 8.6; 5 TABLET ORAL at 08:17

## 2017-07-03 RX ADMIN — ACETAMINOPHEN 650 MG: 325 TABLET, FILM COATED ORAL at 21:17

## 2017-07-03 RX ADMIN — ZINC SULFATE CAP 220 MG (50 MG ELEMENTAL ZN) 220 MG: 220 (50 ZN) CAP at 21:19

## 2017-07-03 RX ADMIN — ACETAMINOPHEN 650 MG: 325 TABLET, FILM COATED ORAL at 15:40

## 2017-07-03 RX ADMIN — IPRATROPIUM BROMIDE AND ALBUTEROL SULFATE 3 ML: .5; 3 SOLUTION RESPIRATORY (INHALATION) at 15:41

## 2017-07-03 NOTE — PROGRESS NOTES
"PM&R PROGRESS NOTE     Patient seen and examined today. Seen in room lying comfortably in bed. Coordinated with team.      HPI/ACTIVE ISSUES   Jim Willingham is a 62 year old male, admitted to [facility] ARU on 6/30/2017 with a long h/o NICM (EF20%, dilated LV, severe MR, Bi V ICD, ho VT), asthma/COPD, CKD, DM2, CECILIA, MDD, ho gastric bypass, admitted to TCU for poor endurance following LVAD Heartmate II placement on 6/19. His postoperative course was fairly uncomplicated     Functionally, Jim Willingham is participating in the therapies in a motivated fashion. He is making good progress. Continue intensity as prescribed     Physical exam    Vital signs:  Temp: 97.3  F (36.3  C) Temp src: Oral BP: (!) 89/73 (Map 77) Pulse: 84 Heart Rate: 94 Resp: 18 SpO2: 96 % O2 Device: None (Room air)   Height: 172.7 cm (5' 8\") Weight: 112.9 kg (248 lb 14.4 oz)  Estimated body mass index is 37.85 kg/(m^2) as calculated from the following:    Height as of this encounter: 1.727 m (5' 8\").    Weight as of this encounter: 112.9 kg (248 lb 14.4 oz).  HEENT NC AT   Lying comfortably in bed resting. Has CPAP on.  Cognitively intact   Speech and comprehension functional   Neck supple  LVAD hum   Lungs CTA  Abdomen  Nondistended, nontender  LE bilateral LE edema            REVIEWED THE MEDICATIONS BELOW   Current Facility-Administered Medications   Medication     warfarin (COUMADIN) tablet 3 mg     ipratropium - albuterol 0.5 mg/2.5 mg/3 mL (DUONEB) neb solution 3 mL     Patient is already receiving anticoagulation with heparin, enoxaparin (LOVENOX), warfarin (COUMADIN)  or other anticoagulant medication     Warfarin Therapy Reminder (Check START DATE - warfarin may be starting in the FUTURE)     acetaminophen (TYLENOL) tablet 650 mg     aspirin chewable tablet 81 mg     albuterol (PROAIR HFA/PROVENTIL HFA/VENTOLIN HFA) Inhaler 2 puff     citalopram (celeXA) tablet 20 mg     cyanocobalamin (VITAMIN B12) injection 1,000 mcg     " fluticasone-vilanterol (BREO ELLIPTA) 200-25 MCG/INH oral inhaler 1 puff     HYDROmorphone (DILAUDID) tablet 2-4 mg     ipratropium - albuterol 0.5 mg/2.5 mg/3 mL (DUONEB) neb solution 3 mL     metFORMIN (GLUCOPHAGE) half-tab 250 mg     metoprolol (LOPRESSOR) tablet 25 mg     montelukast (SINGULAIR) tablet 10 mg     pantoprazole (PROTONIX) EC tablet 40 mg     polyethylene glycol (MIRALAX/GLYCOLAX) Packet 17 g     potassium chloride SA (K-DUR/KLOR-CON M) CR tablet 20 mEq     senna-docusate (SENOKOT-S;PERICOLACE) 8.6-50 MG per tablet 2 tablet     traMADol (ULTRAM) tablet 100 mg     traZODone (DESYREL) tablet 50 mg     zinc sulfate (ZINCATE) capsule 220 mg     umeclidinium (INCRUSE ELLIPTA) 62.5 MCG/INH oral inhaler 1 puff     naloxone (NARCAN) injection 0.1-0.4 mg     bumetanide (BUMEX) tablet 1 mg     glucose 40 % gel 15-30 g    Or     dextrose 50 % injection 25-50 mL    Or     glucagon injection 1 mg         ABDIEL Franco MD

## 2017-07-03 NOTE — PLAN OF CARE
Problem: Goal/Outcome  Goal: Goal Outcome Summary  Outcome: Therapy, progress toward functional goals as expected  Pt making good progress, is IND in room w/o AD, S-SBA with 4WW in halls with staff or family. Gait distance up to 150' limited by fatigue. Used nustep for first time today, doing one minute intervals, tolerated well. Pt reporting having two small dogs at home that have historically shared their bed. Pt instructed to call his wife to have that room cleaned before he returns home and that the bedroom will need to be dog free in order to keep room clean and reduce risk for infection during dressing changes. Will continue to work on endurance, building HEP for strength, activity tolerance and balance.

## 2017-07-03 NOTE — PLAN OF CARE
Problem: Goal/Outcome  Goal: Goal Outcome Summary  Outcome: No Change  FOCUS/GOAL  Pain management and Wound care management     ASSESSMENT, INTERVENTIONS AND CONTINUING PLAN FOR GOAL:  Pt c/o chest incisional pain, dilaudid given x2 with some relief. Requested to be woken up for pain med q 3hrs at night, informed night shift RN.   LVAD drive line exit site has scant amount of bleeding but no s/s of infection noted. 4 old chest tube sites has scant drainage, new dressing applied. Sternal incision w/ liquid bandage CDI, CRISPIN.  LVAD numbers WNL. Map 72 and 78.   and 97.   Pt switched battery power to wall power independently. Using urinal independently, staff emptied.   Wore Teds for edema to BLE, teds removed at HS and legs elevated on pillow in bed.   Will continue to assess pain and offer pain meds.

## 2017-07-03 NOTE — PLAN OF CARE
Problem: Goal/Outcome  Goal: Goal Outcome Summary  OT: Pt OK for IND in room for ADLs, functional transfers, and mobility. Pt completing simple meal prep Mod IND with 4ww, fatigues quickly but able to initiate several EC/WS strategies as needed. Pt OK to amb short distances in hallway with family SBA using 4ww.

## 2017-07-04 LAB
GLUCOSE BLDC GLUCOMTR-MCNC: 124 MG/DL (ref 70–99)
GLUCOSE BLDC GLUCOMTR-MCNC: 87 MG/DL (ref 70–99)
GLUCOSE BLDC GLUCOMTR-MCNC: 92 MG/DL (ref 70–99)
GLUCOSE BLDC GLUCOMTR-MCNC: 93 MG/DL (ref 70–99)
GLUCOSE BLDC GLUCOMTR-MCNC: 93 MG/DL (ref 70–99)
INR PPP: 2.8 (ref 0.86–1.14)

## 2017-07-04 PROCEDURE — 12800006 ZZH R&B REHAB

## 2017-07-04 PROCEDURE — 94640 AIRWAY INHALATION TREATMENT: CPT

## 2017-07-04 PROCEDURE — 25000125 ZZHC RX 250: Performed by: HOSPITALIST

## 2017-07-04 PROCEDURE — 85610 PROTHROMBIN TIME: CPT | Performed by: PHYSICIAN ASSISTANT

## 2017-07-04 PROCEDURE — A9270 NON-COVERED ITEM OR SERVICE: HCPCS | Mod: GY | Performed by: PHYSICAL MEDICINE & REHABILITATION

## 2017-07-04 PROCEDURE — 25000132 ZZH RX MED GY IP 250 OP 250 PS 637: Mod: GY | Performed by: HOSPITALIST

## 2017-07-04 PROCEDURE — 94640 AIRWAY INHALATION TREATMENT: CPT | Mod: 76

## 2017-07-04 PROCEDURE — A9270 NON-COVERED ITEM OR SERVICE: HCPCS | Mod: GY | Performed by: HOSPITALIST

## 2017-07-04 PROCEDURE — 36415 COLL VENOUS BLD VENIPUNCTURE: CPT | Performed by: PHYSICIAN ASSISTANT

## 2017-07-04 PROCEDURE — 25000132 ZZH RX MED GY IP 250 OP 250 PS 637: Mod: GY | Performed by: PHYSICAL MEDICINE & REHABILITATION

## 2017-07-04 PROCEDURE — 25000125 ZZHC RX 250: Performed by: PHYSICAL MEDICINE & REHABILITATION

## 2017-07-04 PROCEDURE — 00000146 ZZHCL STATISTIC GLUCOSE BY METER IP

## 2017-07-04 PROCEDURE — 40000275 ZZH STATISTIC RCP TIME EA 10 MIN

## 2017-07-04 RX ORDER — WARFARIN SODIUM 3 MG/1
3 TABLET ORAL
Status: COMPLETED | OUTPATIENT
Start: 2017-07-04 | End: 2017-07-04

## 2017-07-04 RX ADMIN — ACETAMINOPHEN 650 MG: 325 TABLET, FILM COATED ORAL at 11:00

## 2017-07-04 RX ADMIN — ACETAMINOPHEN 650 MG: 325 TABLET, FILM COATED ORAL at 18:03

## 2017-07-04 RX ADMIN — SENNOSIDES AND DOCUSATE SODIUM 2 TABLET: 8.6; 5 TABLET ORAL at 20:42

## 2017-07-04 RX ADMIN — UMECLIDINIUM 1 PUFF: 62.5 AEROSOL, POWDER ORAL at 08:13

## 2017-07-04 RX ADMIN — FLUTICASONE FUROATE AND VILANTEROL TRIFENATATE 1 PUFF: 200; 25 POWDER RESPIRATORY (INHALATION) at 08:13

## 2017-07-04 RX ADMIN — METOPROLOL TARTRATE 25 MG: 25 TABLET, FILM COATED ORAL at 20:42

## 2017-07-04 RX ADMIN — PANTOPRAZOLE SODIUM 40 MG: 40 TABLET, DELAYED RELEASE ORAL at 08:12

## 2017-07-04 RX ADMIN — ZINC SULFATE CAP 220 MG (50 MG ELEMENTAL ZN) 220 MG: 220 (50 ZN) CAP at 20:42

## 2017-07-04 RX ADMIN — IPRATROPIUM BROMIDE AND ALBUTEROL SULFATE 3 ML: .5; 3 SOLUTION RESPIRATORY (INHALATION) at 15:20

## 2017-07-04 RX ADMIN — TRAMADOL HYDROCHLORIDE 100 MG: 50 TABLET, COATED ORAL at 14:02

## 2017-07-04 RX ADMIN — ACETAMINOPHEN 650 MG: 325 TABLET, FILM COATED ORAL at 05:02

## 2017-07-04 RX ADMIN — SENNOSIDES AND DOCUSATE SODIUM 2 TABLET: 8.6; 5 TABLET ORAL at 08:12

## 2017-07-04 RX ADMIN — Medication 250 MG: at 08:07

## 2017-07-04 RX ADMIN — HYDROMORPHONE HYDROCHLORIDE 4 MG: 2 TABLET ORAL at 21:19

## 2017-07-04 RX ADMIN — HYDROMORPHONE HYDROCHLORIDE 4 MG: 2 TABLET ORAL at 18:04

## 2017-07-04 RX ADMIN — HYDROMORPHONE HYDROCHLORIDE 4 MG: 2 TABLET ORAL at 11:00

## 2017-07-04 RX ADMIN — ZINC SULFATE CAP 220 MG (50 MG ELEMENTAL ZN) 220 MG: 220 (50 ZN) CAP at 08:12

## 2017-07-04 RX ADMIN — HYDROMORPHONE HYDROCHLORIDE 4 MG: 2 TABLET ORAL at 05:05

## 2017-07-04 RX ADMIN — IPRATROPIUM BROMIDE AND ALBUTEROL SULFATE 3 ML: .5; 3 SOLUTION RESPIRATORY (INHALATION) at 07:31

## 2017-07-04 RX ADMIN — HYDROMORPHONE HYDROCHLORIDE 4 MG: 2 TABLET ORAL at 01:03

## 2017-07-04 RX ADMIN — CITALOPRAM HYDROBROMIDE 20 MG: 10 TABLET ORAL at 08:12

## 2017-07-04 RX ADMIN — BUMETANIDE 1 MG: 1 TABLET ORAL at 18:04

## 2017-07-04 RX ADMIN — HYDROMORPHONE HYDROCHLORIDE 4 MG: 2 TABLET ORAL at 08:07

## 2017-07-04 RX ADMIN — ACETAMINOPHEN 650 MG: 325 TABLET, FILM COATED ORAL at 21:19

## 2017-07-04 RX ADMIN — METOPROLOL TARTRATE 25 MG: 25 TABLET, FILM COATED ORAL at 08:12

## 2017-07-04 RX ADMIN — POTASSIUM CHLORIDE 20 MEQ: 750 TABLET, FILM COATED, EXTENDED RELEASE ORAL at 08:12

## 2017-07-04 RX ADMIN — BUMETANIDE 1 MG: 1 TABLET ORAL at 08:12

## 2017-07-04 RX ADMIN — HYDROMORPHONE HYDROCHLORIDE 4 MG: 2 TABLET ORAL at 14:02

## 2017-07-04 RX ADMIN — Medication 250 MG: at 18:03

## 2017-07-04 RX ADMIN — ASPIRIN 81 MG 81 MG: 81 TABLET ORAL at 08:12

## 2017-07-04 RX ADMIN — MONTELUKAST SODIUM 10 MG: 10 TABLET, FILM COATED ORAL at 21:20

## 2017-07-04 RX ADMIN — TRAMADOL HYDROCHLORIDE 100 MG: 50 TABLET, COATED ORAL at 08:07

## 2017-07-04 RX ADMIN — IPRATROPIUM BROMIDE AND ALBUTEROL SULFATE 3 ML: .5; 3 SOLUTION RESPIRATORY (INHALATION) at 20:45

## 2017-07-04 RX ADMIN — IPRATROPIUM BROMIDE AND ALBUTEROL SULFATE 3 ML: .5; 3 SOLUTION RESPIRATORY (INHALATION) at 12:12

## 2017-07-04 RX ADMIN — POTASSIUM CHLORIDE 20 MEQ: 750 TABLET, FILM COATED, EXTENDED RELEASE ORAL at 20:42

## 2017-07-04 RX ADMIN — IPRATROPIUM BROMIDE AND ALBUTEROL SULFATE 3 ML: .5; 3 SOLUTION RESPIRATORY (INHALATION) at 03:06

## 2017-07-04 RX ADMIN — Medication 50 MG: at 21:19

## 2017-07-04 RX ADMIN — WARFARIN SODIUM 3 MG: 3 TABLET ORAL at 18:04

## 2017-07-04 NOTE — PLAN OF CARE
Problem: Goal/Outcome  Goal: Goal Outcome Summary  Outcome: Improving  Alert and oriented to self,place and time, c/o chest incision pain, dilaudid given x 1 so far, [place] in his room L vad parameters WDL, MAP 71, slept most of the night, will continue POC

## 2017-07-04 NOTE — PLAN OF CARE
Problem: Goal/Outcome  Goal: Goal Outcome Summary  Outcome: No Change  FOCUS/GOAL  Medical management     ASSESSMENT, INTERVENTIONS AND CONTINUING PLAN FOR GOAL:  Pt A&Ox4, uses call light appropriately. Pt takes PRN ultram and dilaudid as they become available. Pt is wearing bipap for HS. LVAD dressing changed, LVAD values all WDL. Pt is independent in his room. Continent of bladder using his urinal at bedside. Will continue with POC.

## 2017-07-04 NOTE — PROGRESS NOTES
"PM&R PROGRESS NOTE     Patient seen and examined today. Seen in room lying comfortably in bed. Has no new complaints today. Will be performing therapy exercises on own today as it is July 4th and no scheduled therapy today.  No new complaints.     HPI/ACTIVE ISSUES   Jim Willingham is a 62 year old male, admitted to [facility] ARU on 6/30/2017 with a long h/o NICM (EF20%, dilated LV, severe MR, Bi V ICD, ho VT), asthma/COPD, CKD, DM2, CECILIA, MDD, ho gastric bypass, admitted to TCU for poor endurance following LVAD Heartmate II placement on 6/19. His postoperative course was fairly uncomplicated     Functionally, Jim Willingham is participating in the therapies in a motivated fashion. He is making good progress.     Followed by Hospitalist and LVAD team.    Physical exam    Vital signs:  Temp: 99  F (37.2  C) Temp src: Oral BP: (!) 83/70 (MAP 78) Pulse: 83   Resp: 18 SpO2: 96 % O2 Device: None (Room air)   Height: 172.7 cm (5' 8\") Weight: 111.4 kg (245 lb 9.6 oz)  Estimated body mass index is 37.34 kg/(m^2) as calculated from the following:    Height as of this encounter: 1.727 m (5' 8\").    Weight as of this encounter: 111.4 kg (245 lb 9.6 oz).  HEENT NC AT   Lying comfortably in bed resting.   Cognitively intact   Speech and comprehension functional   Neck supple  LVAD   Lungs Clear  Abdomen  Nondistended, nontender  LE bilateral LE edema            REVIEWED THE MEDICATIONS BELOW   Current Facility-Administered Medications   Medication     warfarin (COUMADIN) tablet 3 mg     ipratropium - albuterol 0.5 mg/2.5 mg/3 mL (DUONEB) neb solution 3 mL     Patient is already receiving anticoagulation with heparin, enoxaparin (LOVENOX), warfarin (COUMADIN)  or other anticoagulant medication     Warfarin Therapy Reminder (Check START DATE - warfarin may be starting in the FUTURE)     acetaminophen (TYLENOL) tablet 650 mg     aspirin chewable tablet 81 mg     albuterol (PROAIR HFA/PROVENTIL HFA/VENTOLIN HFA) Inhaler 2 puff     " citalopram (celeXA) tablet 20 mg     cyanocobalamin (VITAMIN B12) injection 1,000 mcg     fluticasone-vilanterol (BREO ELLIPTA) 200-25 MCG/INH oral inhaler 1 puff     HYDROmorphone (DILAUDID) tablet 2-4 mg     ipratropium - albuterol 0.5 mg/2.5 mg/3 mL (DUONEB) neb solution 3 mL     metFORMIN (GLUCOPHAGE) half-tab 250 mg     metoprolol (LOPRESSOR) tablet 25 mg     montelukast (SINGULAIR) tablet 10 mg     pantoprazole (PROTONIX) EC tablet 40 mg     polyethylene glycol (MIRALAX/GLYCOLAX) Packet 17 g     potassium chloride SA (K-DUR/KLOR-CON M) CR tablet 20 mEq     senna-docusate (SENOKOT-S;PERICOLACE) 8.6-50 MG per tablet 2 tablet     traMADol (ULTRAM) tablet 100 mg     traZODone (DESYREL) tablet 50 mg     zinc sulfate (ZINCATE) capsule 220 mg     umeclidinium (INCRUSE ELLIPTA) 62.5 MCG/INH oral inhaler 1 puff     naloxone (NARCAN) injection 0.1-0.4 mg     bumetanide (BUMEX) tablet 1 mg     glucose 40 % gel 15-30 g    Or     dextrose 50 % injection 25-50 mL    Or     glucagon injection 1 mg         ABDIEL Franco MD

## 2017-07-04 NOTE — PLAN OF CARE
Problem: Goal/Outcome  Goal: Goal Outcome Summary  Outcome: Improving  FOCUS/GOAL  Bowel management, Bladder management, Pain management, Medical management and Mobility     ASSESSMENT, INTERVENTIONS AND CONTINUING PLAN FOR GOAL:  Pt independent in room with mobility and cares. Able to perform LVAD function test this morning and switch from wall power to battery power this morning. Vitals and MAP stable. LVAD function within parameters. LBM yesterday. Voiding using the urinal with staff set-up. Sternal incision and 2 chest tube sites, CRISPIN. LVAD driveline site dressing, CDI. Prn ultram and prn dilaudid given with scheduled tylenol per pt request for chest incision pain with good relief.

## 2017-07-05 LAB
GLUCOSE BLDC GLUCOMTR-MCNC: 106 MG/DL (ref 70–99)
GLUCOSE BLDC GLUCOMTR-MCNC: 121 MG/DL (ref 70–99)
INR PPP: 2.79 (ref 0.86–1.14)

## 2017-07-05 PROCEDURE — 97530 THERAPEUTIC ACTIVITIES: CPT | Mod: GP | Performed by: STUDENT IN AN ORGANIZED HEALTH CARE EDUCATION/TRAINING PROGRAM

## 2017-07-05 PROCEDURE — A9270 NON-COVERED ITEM OR SERVICE: HCPCS | Mod: GY | Performed by: HOSPITALIST

## 2017-07-05 PROCEDURE — 97110 THERAPEUTIC EXERCISES: CPT | Mod: GP

## 2017-07-05 PROCEDURE — 12800006 ZZH R&B REHAB

## 2017-07-05 PROCEDURE — 40000133 ZZH STATISTIC OT WARD VISIT

## 2017-07-05 PROCEDURE — 40000193 ZZH STATISTIC PT WARD VISIT

## 2017-07-05 PROCEDURE — 94640 AIRWAY INHALATION TREATMENT: CPT

## 2017-07-05 PROCEDURE — 25000125 ZZHC RX 250: Performed by: HOSPITALIST

## 2017-07-05 PROCEDURE — 94640 AIRWAY INHALATION TREATMENT: CPT | Mod: 76

## 2017-07-05 PROCEDURE — 25000132 ZZH RX MED GY IP 250 OP 250 PS 637: Mod: GY | Performed by: PHYSICAL MEDICINE & REHABILITATION

## 2017-07-05 PROCEDURE — A9270 NON-COVERED ITEM OR SERVICE: HCPCS | Mod: GY | Performed by: PHYSICAL MEDICINE & REHABILITATION

## 2017-07-05 PROCEDURE — 85610 PROTHROMBIN TIME: CPT | Performed by: PHYSICIAN ASSISTANT

## 2017-07-05 PROCEDURE — 97110 THERAPEUTIC EXERCISES: CPT | Mod: GP | Performed by: STUDENT IN AN ORGANIZED HEALTH CARE EDUCATION/TRAINING PROGRAM

## 2017-07-05 PROCEDURE — 25000125 ZZHC RX 250: Performed by: PHYSICAL MEDICINE & REHABILITATION

## 2017-07-05 PROCEDURE — 36415 COLL VENOUS BLD VENIPUNCTURE: CPT | Performed by: PHYSICIAN ASSISTANT

## 2017-07-05 PROCEDURE — 97535 SELF CARE MNGMENT TRAINING: CPT | Mod: GO

## 2017-07-05 PROCEDURE — 25000132 ZZH RX MED GY IP 250 OP 250 PS 637: Mod: GY | Performed by: HOSPITALIST

## 2017-07-05 PROCEDURE — 40000193 ZZH STATISTIC PT WARD VISIT: Performed by: STUDENT IN AN ORGANIZED HEALTH CARE EDUCATION/TRAINING PROGRAM

## 2017-07-05 PROCEDURE — 97110 THERAPEUTIC EXERCISES: CPT | Mod: GO

## 2017-07-05 PROCEDURE — 40000275 ZZH STATISTIC RCP TIME EA 10 MIN

## 2017-07-05 PROCEDURE — 00000146 ZZHCL STATISTIC GLUCOSE BY METER IP

## 2017-07-05 RX ORDER — WARFARIN SODIUM 3 MG/1
3 TABLET ORAL
Status: COMPLETED | OUTPATIENT
Start: 2017-07-05 | End: 2017-07-05

## 2017-07-05 RX ADMIN — ZINC SULFATE CAP 220 MG (50 MG ELEMENTAL ZN) 220 MG: 220 (50 ZN) CAP at 21:49

## 2017-07-05 RX ADMIN — POTASSIUM CHLORIDE 20 MEQ: 750 TABLET, FILM COATED, EXTENDED RELEASE ORAL at 07:44

## 2017-07-05 RX ADMIN — CITALOPRAM HYDROBROMIDE 20 MG: 10 TABLET ORAL at 07:42

## 2017-07-05 RX ADMIN — IPRATROPIUM BROMIDE AND ALBUTEROL SULFATE 3 ML: .5; 3 SOLUTION RESPIRATORY (INHALATION) at 20:49

## 2017-07-05 RX ADMIN — WARFARIN SODIUM 3 MG: 3 TABLET ORAL at 17:50

## 2017-07-05 RX ADMIN — IPRATROPIUM BROMIDE AND ALBUTEROL SULFATE 3 ML: .5; 3 SOLUTION RESPIRATORY (INHALATION) at 03:31

## 2017-07-05 RX ADMIN — POTASSIUM CHLORIDE 20 MEQ: 750 TABLET, FILM COATED, EXTENDED RELEASE ORAL at 21:49

## 2017-07-05 RX ADMIN — HYDROMORPHONE HYDROCHLORIDE 4 MG: 2 TABLET ORAL at 09:11

## 2017-07-05 RX ADMIN — SENNOSIDES AND DOCUSATE SODIUM 2 TABLET: 8.6; 5 TABLET ORAL at 07:44

## 2017-07-05 RX ADMIN — IPRATROPIUM BROMIDE AND ALBUTEROL SULFATE 3 ML: .5; 3 SOLUTION RESPIRATORY (INHALATION) at 11:47

## 2017-07-05 RX ADMIN — ACETAMINOPHEN 650 MG: 325 TABLET, FILM COATED ORAL at 10:31

## 2017-07-05 RX ADMIN — MONTELUKAST SODIUM 10 MG: 10 TABLET, FILM COATED ORAL at 21:49

## 2017-07-05 RX ADMIN — IPRATROPIUM BROMIDE AND ALBUTEROL SULFATE 3 ML: .5; 3 SOLUTION RESPIRATORY (INHALATION) at 15:57

## 2017-07-05 RX ADMIN — FLUTICASONE FUROATE AND VILANTEROL TRIFENATATE 1 PUFF: 200; 25 POWDER RESPIRATORY (INHALATION) at 07:48

## 2017-07-05 RX ADMIN — ZINC SULFATE CAP 220 MG (50 MG ELEMENTAL ZN) 220 MG: 220 (50 ZN) CAP at 07:42

## 2017-07-05 RX ADMIN — HYDROMORPHONE HYDROCHLORIDE 4 MG: 2 TABLET ORAL at 00:35

## 2017-07-05 RX ADMIN — METOPROLOL TARTRATE 25 MG: 25 TABLET, FILM COATED ORAL at 21:49

## 2017-07-05 RX ADMIN — ASPIRIN 81 MG 81 MG: 81 TABLET ORAL at 07:43

## 2017-07-05 RX ADMIN — HYDROMORPHONE HYDROCHLORIDE 4 MG: 2 TABLET ORAL at 03:32

## 2017-07-05 RX ADMIN — Medication 50 MG: at 21:48

## 2017-07-05 RX ADMIN — IPRATROPIUM BROMIDE AND ALBUTEROL SULFATE 3 ML: .5; 3 SOLUTION RESPIRATORY (INHALATION) at 07:20

## 2017-07-05 RX ADMIN — ACETAMINOPHEN 650 MG: 325 TABLET, FILM COATED ORAL at 03:32

## 2017-07-05 RX ADMIN — HYDROMORPHONE HYDROCHLORIDE 4 MG: 2 TABLET ORAL at 21:48

## 2017-07-05 RX ADMIN — SENNOSIDES AND DOCUSATE SODIUM 2 TABLET: 8.6; 5 TABLET ORAL at 21:49

## 2017-07-05 RX ADMIN — PANTOPRAZOLE SODIUM 40 MG: 40 TABLET, DELAYED RELEASE ORAL at 07:44

## 2017-07-05 RX ADMIN — UMECLIDINIUM 1 PUFF: 62.5 AEROSOL, POWDER ORAL at 07:49

## 2017-07-05 RX ADMIN — HYDROMORPHONE HYDROCHLORIDE 4 MG: 2 TABLET ORAL at 17:28

## 2017-07-05 RX ADMIN — ACETAMINOPHEN 650 MG: 325 TABLET, FILM COATED ORAL at 17:49

## 2017-07-05 RX ADMIN — METOPROLOL TARTRATE 25 MG: 25 TABLET, FILM COATED ORAL at 07:45

## 2017-07-05 RX ADMIN — ACETAMINOPHEN 650 MG: 325 TABLET, FILM COATED ORAL at 21:48

## 2017-07-05 RX ADMIN — HYDROMORPHONE HYDROCHLORIDE 4 MG: 2 TABLET ORAL at 06:29

## 2017-07-05 RX ADMIN — BUMETANIDE 1 MG: 1 TABLET ORAL at 17:50

## 2017-07-05 RX ADMIN — Medication 250 MG: at 07:43

## 2017-07-05 RX ADMIN — Medication 250 MG: at 17:50

## 2017-07-05 RX ADMIN — BUMETANIDE 1 MG: 1 TABLET ORAL at 07:43

## 2017-07-05 NOTE — PLAN OF CARE
Problem: Goal/Outcome  Goal: Goal Outcome Summary  FOCUS/GOAL  Bowel management, Bladder management, Pain management, Medical management and Mobility     ASSESSMENT, INTERVENTIONS AND CONTINUING PLAN FOR GOAL:  Pt is alert and oriented. Continent of bladder, voiding spontaneously using urinal at the bedside. No BM, passing flatus. Pain managed with Dilaudid q 3 hours with good relief. Pt slept throughout the night with CPAP on. LVAD parameters within normal limits, no alarms. Up independently in room with no assistive device. Uses call light appropriately, able to make needs known. Will continue with POC.

## 2017-07-05 NOTE — PLAN OF CARE
Problem: Goal/Outcome  Goal: Goal Outcome Summary  FOCUS/GOAL  Nutrition/Feeding/Swallowing precautions, Medication management and Medical management     ASSESSMENT, INTERVENTIONS AND CONTINUING PLAN FOR GOAL:  Patient is alert/oriented x4 is able to communicate care needs appopriately, has been safely Mod-I in room this shift. LVAD parameters have been WNL, patient denies any SOB but has had ongoing pain on this shift, gave PRN Dilauded 4mg x3 on this shift per patient request q3 hours for incisional pain as well as left shoulder pain and pain is mostly controlled with that but also can have PRN Tylenol and Tramadol. Patient is able direct cares, dressing change to driveline site done, site is CD/I, two island dressing to old chest tube sites were changed, there is still some drainage to those, continue to monitor. Wife was here at start of shift and had question regarding the kit for dressing changes and was wanting to know when she could get more education, offered to do some teaching tonight with dressing change but she wanted to get going for the night.  No additional care concerns at this time, continue with POC.

## 2017-07-05 NOTE — PLAN OF CARE
Problem: Goal/Outcome  Goal: Goal Outcome Summary  Outcome: Improving  FOCUS/GOAL  Medication management     ASSESSMENT, INTERVENTIONS AND CONTINUING PLAN FOR GOAL:  Pt has been doing OK, and knows his meds and LVAD readings and did self test. Pain med given with some relief. Pt is MOD I and CC tomorrow and d/c tomorrow.

## 2017-07-05 NOTE — PROGRESS NOTES
"PM&R PROGRESS NOTE     Patient seen and examined today. Seen in room lying comfortably in bed. Has no new complaints today. Will be performing therapy exercises on own today as it is July 4th and no scheduled therapy today.  No new complaints.     HPI/ACTIVE ISSUES   Jim Willingham is a 62 year old male, admitted to [facility] ARU on 6/30/2017 with a long h/o NICM (EF20%, dilated LV, severe MR, Bi V ICD, ho VT), asthma/COPD, CKD, DM2, CECILIA, MDD, ho gastric bypass, admitted to TCU for poor endurance following LVAD Heartmate II placement on 6/19. His postoperative course was fairly uncomplicated     Functionally, Jim Willingham is participating in the therapies in a motivated fashion. Will have Care Conference tomorrow. Anticipate discharge home on Friday.    Followed by Hospitalist and LVAD team.    Physical exam    Vital signs:  Temp: 98.8  F (37.1  C) Temp src: Oral BP: (!) 89/68 (85) Pulse: 67 Heart Rate: 93 Resp: 16 SpO2: 96 % O2 Device: None (Room air)   Height: 172.7 cm (5' 8\") Weight: 112 kg (246 lb 14.4 oz)  Estimated body mass index is 37.54 kg/(m^2) as calculated from the following:    Height as of this encounter: 1.727 m (5' 8\").    Weight as of this encounter: 112 kg (246 lb 14.4 oz).  HEENT NC AT   Lying comfortably in bed resting.   Cognitively intact   Speech and comprehension functional   Neck supple  LVAD   Lungs Clear  Abdomen  Nondistended, nontender  LE bilateral LE edema            REVIEWED THE MEDICATIONS BELOW   Current Facility-Administered Medications   Medication     warfarin (COUMADIN) tablet 3 mg     ipratropium - albuterol 0.5 mg/2.5 mg/3 mL (DUONEB) neb solution 3 mL     Patient is already receiving anticoagulation with heparin, enoxaparin (LOVENOX), warfarin (COUMADIN)  or other anticoagulant medication     Warfarin Therapy Reminder (Check START DATE - warfarin may be starting in the FUTURE)     acetaminophen (TYLENOL) tablet 650 mg     aspirin chewable tablet 81 mg     albuterol (PROAIR " HFA/PROVENTIL HFA/VENTOLIN HFA) Inhaler 2 puff     citalopram (celeXA) tablet 20 mg     cyanocobalamin (VITAMIN B12) injection 1,000 mcg     fluticasone-vilanterol (BREO ELLIPTA) 200-25 MCG/INH oral inhaler 1 puff     HYDROmorphone (DILAUDID) tablet 2-4 mg     ipratropium - albuterol 0.5 mg/2.5 mg/3 mL (DUONEB) neb solution 3 mL     metFORMIN (GLUCOPHAGE) half-tab 250 mg     metoprolol (LOPRESSOR) tablet 25 mg     montelukast (SINGULAIR) tablet 10 mg     pantoprazole (PROTONIX) EC tablet 40 mg     polyethylene glycol (MIRALAX/GLYCOLAX) Packet 17 g     potassium chloride SA (K-DUR/KLOR-CON M) CR tablet 20 mEq     senna-docusate (SENOKOT-S;PERICOLACE) 8.6-50 MG per tablet 2 tablet     traMADol (ULTRAM) tablet 100 mg     traZODone (DESYREL) tablet 50 mg     zinc sulfate (ZINCATE) capsule 220 mg     umeclidinium (INCRUSE ELLIPTA) 62.5 MCG/INH oral inhaler 1 puff     naloxone (NARCAN) injection 0.1-0.4 mg     bumetanide (BUMEX) tablet 1 mg     glucose 40 % gel 15-30 g    Or     dextrose 50 % injection 25-50 mL    Or     glucagon injection 1 mg         ABDIEL Franco MD

## 2017-07-06 LAB
ANION GAP SERPL CALCULATED.3IONS-SCNC: 9 MMOL/L (ref 3–14)
BUN SERPL-MCNC: 28 MG/DL (ref 7–30)
CALCIUM SERPL-MCNC: 8.9 MG/DL (ref 8.5–10.1)
CHLORIDE SERPL-SCNC: 103 MMOL/L (ref 94–109)
CO2 SERPL-SCNC: 26 MMOL/L (ref 20–32)
CREAT SERPL-MCNC: 1.32 MG/DL (ref 0.66–1.25)
GFR SERPL CREATININE-BSD FRML MDRD: 55 ML/MIN/1.7M2
GLUCOSE BLDC GLUCOMTR-MCNC: 104 MG/DL (ref 70–99)
GLUCOSE BLDC GLUCOMTR-MCNC: 85 MG/DL (ref 70–99)
GLUCOSE SERPL-MCNC: 98 MG/DL (ref 70–99)
INR PPP: 2.56 (ref 0.86–1.14)
POTASSIUM SERPL-SCNC: 4.1 MMOL/L (ref 3.4–5.3)
SODIUM SERPL-SCNC: 138 MMOL/L (ref 133–144)

## 2017-07-06 PROCEDURE — 25000132 ZZH RX MED GY IP 250 OP 250 PS 637: Mod: GY | Performed by: PHYSICIAN ASSISTANT

## 2017-07-06 PROCEDURE — A9270 NON-COVERED ITEM OR SERVICE: HCPCS | Mod: GY | Performed by: PHYSICIAN ASSISTANT

## 2017-07-06 PROCEDURE — 00000146 ZZHCL STATISTIC GLUCOSE BY METER IP

## 2017-07-06 PROCEDURE — 25000125 ZZHC RX 250: Performed by: PHYSICAL MEDICINE & REHABILITATION

## 2017-07-06 PROCEDURE — 36415 COLL VENOUS BLD VENIPUNCTURE: CPT | Performed by: PHYSICIAN ASSISTANT

## 2017-07-06 PROCEDURE — 85610 PROTHROMBIN TIME: CPT | Performed by: PHYSICIAN ASSISTANT

## 2017-07-06 PROCEDURE — 97530 THERAPEUTIC ACTIVITIES: CPT | Mod: GP | Performed by: STUDENT IN AN ORGANIZED HEALTH CARE EDUCATION/TRAINING PROGRAM

## 2017-07-06 PROCEDURE — A9270 NON-COVERED ITEM OR SERVICE: HCPCS | Mod: GY | Performed by: HOSPITALIST

## 2017-07-06 PROCEDURE — 80048 BASIC METABOLIC PNL TOTAL CA: CPT | Performed by: PHYSICIAN ASSISTANT

## 2017-07-06 PROCEDURE — 40000187 ZZH STATISTIC PATIENT MED CONFERENCE < 30 MIN: Performed by: OCCUPATIONAL THERAPIST

## 2017-07-06 PROCEDURE — A9270 NON-COVERED ITEM OR SERVICE: HCPCS | Mod: GY | Performed by: PHYSICAL MEDICINE & REHABILITATION

## 2017-07-06 PROCEDURE — 25000132 ZZH RX MED GY IP 250 OP 250 PS 637: Mod: GY | Performed by: HOSPITALIST

## 2017-07-06 PROCEDURE — 97110 THERAPEUTIC EXERCISES: CPT | Mod: GP | Performed by: STUDENT IN AN ORGANIZED HEALTH CARE EDUCATION/TRAINING PROGRAM

## 2017-07-06 PROCEDURE — 40000275 ZZH STATISTIC RCP TIME EA 10 MIN

## 2017-07-06 PROCEDURE — 97110 THERAPEUTIC EXERCISES: CPT | Mod: GO | Performed by: OCCUPATIONAL THERAPIST

## 2017-07-06 PROCEDURE — 12800006 ZZH R&B REHAB

## 2017-07-06 PROCEDURE — 40000133 ZZH STATISTIC OT WARD VISIT: Performed by: OCCUPATIONAL THERAPIST

## 2017-07-06 PROCEDURE — 40000183 ZZH STATISTIC PT MED CONFERENCE < 30 MIN: Performed by: STUDENT IN AN ORGANIZED HEALTH CARE EDUCATION/TRAINING PROGRAM

## 2017-07-06 PROCEDURE — 40000193 ZZH STATISTIC PT WARD VISIT: Performed by: STUDENT IN AN ORGANIZED HEALTH CARE EDUCATION/TRAINING PROGRAM

## 2017-07-06 PROCEDURE — 94640 AIRWAY INHALATION TREATMENT: CPT

## 2017-07-06 PROCEDURE — 97116 GAIT TRAINING THERAPY: CPT | Mod: GP | Performed by: STUDENT IN AN ORGANIZED HEALTH CARE EDUCATION/TRAINING PROGRAM

## 2017-07-06 PROCEDURE — 94640 AIRWAY INHALATION TREATMENT: CPT | Mod: 76

## 2017-07-06 PROCEDURE — 97535 SELF CARE MNGMENT TRAINING: CPT | Mod: GO | Performed by: OCCUPATIONAL THERAPIST

## 2017-07-06 PROCEDURE — 97530 THERAPEUTIC ACTIVITIES: CPT | Mod: GO | Performed by: OCCUPATIONAL THERAPIST

## 2017-07-06 PROCEDURE — 25000132 ZZH RX MED GY IP 250 OP 250 PS 637: Mod: GY | Performed by: PHYSICAL MEDICINE & REHABILITATION

## 2017-07-06 RX ORDER — TRAZODONE HYDROCHLORIDE 50 MG/1
TABLET, FILM COATED ORAL
Qty: 11 TABLET | Refills: 0 | Status: ON HOLD | OUTPATIENT
Start: 2017-07-06 | End: 2018-07-06

## 2017-07-06 RX ORDER — ASPIRIN 81 MG/1
81 TABLET, CHEWABLE ORAL DAILY
Qty: 36 TABLET | Status: ON HOLD | COMMUNITY
Start: 2017-07-06 | End: 2021-05-31

## 2017-07-06 RX ORDER — PANTOPRAZOLE SODIUM 40 MG/1
40 TABLET, DELAYED RELEASE ORAL EVERY MORNING
Qty: 30 TABLET | Refills: 0 | Status: SHIPPED | OUTPATIENT
Start: 2017-07-06 | End: 2017-07-25

## 2017-07-06 RX ORDER — HYDROMORPHONE HYDROCHLORIDE 2 MG/1
2 TABLET ORAL
Status: DISCONTINUED | OUTPATIENT
Start: 2017-07-06 | End: 2017-07-07 | Stop reason: HOSPADM

## 2017-07-06 RX ORDER — WARFARIN SODIUM 3 MG/1
TABLET ORAL
Qty: 30 TABLET | Refills: 0 | Status: SHIPPED | OUTPATIENT
Start: 2017-07-06 | End: 2020-01-16

## 2017-07-06 RX ORDER — WARFARIN SODIUM 3 MG/1
3 TABLET ORAL
Status: COMPLETED | OUTPATIENT
Start: 2017-07-06 | End: 2017-07-06

## 2017-07-06 RX ORDER — FLUORIDE TOOTHPASTE
10 TOOTHPASTE DENTAL 4 TIMES DAILY PRN
Status: DISCONTINUED | OUTPATIENT
Start: 2017-07-06 | End: 2017-07-07 | Stop reason: HOSPADM

## 2017-07-06 RX ORDER — BUMETANIDE 1 MG/1
2 TABLET ORAL
Status: DISCONTINUED | OUTPATIENT
Start: 2017-07-06 | End: 2017-07-07 | Stop reason: HOSPADM

## 2017-07-06 RX ORDER — HYDROMORPHONE HYDROCHLORIDE 2 MG/1
2 TABLET ORAL EVERY 4 HOURS PRN
Qty: 45 TABLET | Refills: 0 | Status: SHIPPED | OUTPATIENT
Start: 2017-07-06 | End: 2018-04-11

## 2017-07-06 RX ORDER — POTASSIUM CHLORIDE 1500 MG/1
20 TABLET, EXTENDED RELEASE ORAL 2 TIMES DAILY
Qty: 60 TABLET | Refills: 0 | Status: SHIPPED | OUTPATIENT
Start: 2017-07-06 | End: 2017-07-12

## 2017-07-06 RX ORDER — MAGNESIUM CARB/ALUMINUM HYDROX 105-160MG
296 TABLET,CHEWABLE ORAL ONCE
Status: COMPLETED | OUTPATIENT
Start: 2017-07-06 | End: 2017-07-06

## 2017-07-06 RX ORDER — AMOXICILLIN 250 MG
2 CAPSULE ORAL 2 TIMES DAILY PRN
Qty: 100 TABLET | Refills: 0 | Status: SHIPPED | OUTPATIENT
Start: 2017-07-06 | End: 2018-11-20

## 2017-07-06 RX ADMIN — MONTELUKAST SODIUM 10 MG: 10 TABLET, FILM COATED ORAL at 22:04

## 2017-07-06 RX ADMIN — HYDROMORPHONE HYDROCHLORIDE 4 MG: 2 TABLET ORAL at 01:00

## 2017-07-06 RX ADMIN — ACETAMINOPHEN 650 MG: 325 TABLET, FILM COATED ORAL at 22:04

## 2017-07-06 RX ADMIN — UMECLIDINIUM 1 PUFF: 62.5 AEROSOL, POWDER ORAL at 08:12

## 2017-07-06 RX ADMIN — IPRATROPIUM BROMIDE AND ALBUTEROL SULFATE 3 ML: .5; 3 SOLUTION RESPIRATORY (INHALATION) at 20:29

## 2017-07-06 RX ADMIN — HYDROMORPHONE HYDROCHLORIDE 2 MG: 2 TABLET ORAL at 20:09

## 2017-07-06 RX ADMIN — BUMETANIDE 2 MG: 1 TABLET ORAL at 17:37

## 2017-07-06 RX ADMIN — MAGNESIUM CITRATE 296 ML: 1.75 LIQUID ORAL at 17:40

## 2017-07-06 RX ADMIN — ZINC SULFATE CAP 220 MG (50 MG ELEMENTAL ZN) 220 MG: 220 (50 ZN) CAP at 08:11

## 2017-07-06 RX ADMIN — PANTOPRAZOLE SODIUM 40 MG: 40 TABLET, DELAYED RELEASE ORAL at 08:11

## 2017-07-06 RX ADMIN — IPRATROPIUM BROMIDE AND ALBUTEROL SULFATE 3 ML: .5; 3 SOLUTION RESPIRATORY (INHALATION) at 15:55

## 2017-07-06 RX ADMIN — FLUTICASONE FUROATE AND VILANTEROL TRIFENATATE 1 PUFF: 200; 25 POWDER RESPIRATORY (INHALATION) at 08:12

## 2017-07-06 RX ADMIN — ACETAMINOPHEN 650 MG: 325 TABLET, FILM COATED ORAL at 17:36

## 2017-07-06 RX ADMIN — CITALOPRAM HYDROBROMIDE 20 MG: 10 TABLET ORAL at 08:11

## 2017-07-06 RX ADMIN — ZINC SULFATE CAP 220 MG (50 MG ELEMENTAL ZN) 220 MG: 220 (50 ZN) CAP at 20:08

## 2017-07-06 RX ADMIN — METOPROLOL TARTRATE 25 MG: 25 TABLET, FILM COATED ORAL at 20:08

## 2017-07-06 RX ADMIN — SENNOSIDES AND DOCUSATE SODIUM 2 TABLET: 8.6; 5 TABLET ORAL at 20:08

## 2017-07-06 RX ADMIN — IPRATROPIUM BROMIDE AND ALBUTEROL SULFATE 3 ML: .5; 3 SOLUTION RESPIRATORY (INHALATION) at 11:56

## 2017-07-06 RX ADMIN — POTASSIUM CHLORIDE 20 MEQ: 750 TABLET, FILM COATED, EXTENDED RELEASE ORAL at 20:07

## 2017-07-06 RX ADMIN — METOPROLOL TARTRATE 25 MG: 25 TABLET, FILM COATED ORAL at 08:12

## 2017-07-06 RX ADMIN — POTASSIUM CHLORIDE 20 MEQ: 750 TABLET, FILM COATED, EXTENDED RELEASE ORAL at 08:11

## 2017-07-06 RX ADMIN — ACETAMINOPHEN 650 MG: 325 TABLET, FILM COATED ORAL at 05:03

## 2017-07-06 RX ADMIN — IPRATROPIUM BROMIDE AND ALBUTEROL SULFATE 3 ML: .5; 3 SOLUTION RESPIRATORY (INHALATION) at 07:43

## 2017-07-06 RX ADMIN — ASPIRIN 81 MG 81 MG: 81 TABLET ORAL at 08:12

## 2017-07-06 RX ADMIN — HYDROMORPHONE HYDROCHLORIDE 4 MG: 2 TABLET ORAL at 12:44

## 2017-07-06 RX ADMIN — Medication 50 MG: at 22:04

## 2017-07-06 RX ADMIN — TRAMADOL HYDROCHLORIDE 100 MG: 50 TABLET, COATED ORAL at 20:08

## 2017-07-06 RX ADMIN — HYDROMORPHONE HYDROCHLORIDE 2 MG: 2 TABLET ORAL at 08:05

## 2017-07-06 RX ADMIN — WARFARIN SODIUM 3 MG: 3 TABLET ORAL at 17:37

## 2017-07-06 RX ADMIN — BUMETANIDE 1 MG: 1 TABLET ORAL at 08:11

## 2017-07-06 RX ADMIN — HYDROMORPHONE HYDROCHLORIDE 4 MG: 2 TABLET ORAL at 05:04

## 2017-07-06 RX ADMIN — SENNOSIDES AND DOCUSATE SODIUM 2 TABLET: 8.6; 5 TABLET ORAL at 08:11

## 2017-07-06 RX ADMIN — Medication 250 MG: at 17:37

## 2017-07-06 RX ADMIN — Medication 250 MG: at 08:11

## 2017-07-06 RX ADMIN — TRAMADOL HYDROCHLORIDE 100 MG: 50 TABLET, COATED ORAL at 08:05

## 2017-07-06 RX ADMIN — ACETAMINOPHEN 650 MG: 325 TABLET, FILM COATED ORAL at 11:22

## 2017-07-06 NOTE — PLAN OF CARE
Problem: Goal/Outcome  Goal: Goal Outcome Summary  FOCUS/GOAL  Bowel management, Pain management and Wound care management     ASSESSMENT, INTERVENTIONS AND CONTINUING PLAN FOR GOAL:  Pt here with Heartmate 2 LVAD system.  He has deconditioning and requires rehab before returning home.  He has a care conference at 10am and his wife will attend.  He has chest pain and has been taking dilaudid 4mg q 3hrs.  He was prompted to check batteries a by writer and he had two cells left in both.  He then switched over to wall power independently.  His wife was not here and I wanted to educate her on dressing change without the kit (in the event that they had no kit available).  The four chest tube puncture sites were cleaned and changed as well (monitor for drainage.  He has a care conference tomorrow to discuss d/c plan that could be as soon as Friday.

## 2017-07-06 NOTE — PHARMACY-ANTICOAGULATION SERVICE
Clinical Pharmacy- Warfarin Discharge Note  This patient is currently on warfarin for  LVAD.  INR Goal= 2-3  Expected length of therapy undetermined.    Anticoagulation Dose History     Recent Dosing and Labs Latest Ref Rng & Units 6/30/2017 7/1/2017 7/2/2017 7/3/2017 7/4/2017 7/5/2017 7/6/2017    KUSHZ IMS TEMPLATE - 4 mg - 4 mg - - - -    Warfarin 3 mg - - - - 3 mg 3 mg 3 mg -    Warfarin 5 mg - - 5 mg - - - - -    INR 0.86 - 1.14 2.93(H) 2.66(H) 2.85(H) 2.96(H) 2.80(H) 2.79(H) 2.56(H)          Vitamin K doses administered during the last 7 days: none  FFP administered during the last 7 days: none  Recommend discharging the patient on a warfarin regimen of 3 mg with a prescription for warfarin 3mg tablets.      The patient should have an INR checked in 1-2 days.  Please, note that the patient is also on aspirin and escitalopram - monitor for possible increase in INR and/or risk of bleeding.  Kiesha Young, PharmD, BCPS July 6, 2017

## 2017-07-06 NOTE — PLAN OF CARE
Problem: Goal/Outcome  Goal: Goal Outcome Summary  Outcome: Improving  FOCUS/GOAL  Bowel management, Bladder management, Pain management, Medical management and Mobility     ASSESSMENT, INTERVENTIONS AND CONTINUING PLAN FOR GOAL:  Pt independent in room mobility. He is able to manage dressing, grooming and toileting independently. Vitals and MAP stable. LVAD function within parameters. Pt able to perform LVAD function test this morning and switch from wall power to batteries independently. Sternal incision healing well and CRISPIN. Chest tube sites and LVAD driveline site covered with dressing. No strike through drainage noted. Pt trying to wean off his prn pain meds (ultram and hydromorphone) so trying to delay frequency of taking them. Pt c/o being constipated today and requested for magnesium citrate. Med is here now but he wants to take it after therapies. Care conference today with plan to discharge home tomorrow.

## 2017-07-06 NOTE — CARE CONFERENCE
Acute Rehab Care Conference/Team Rounds      Type: Patient Conference    Present: Dr Nabor Franco, Delisa Zhong [facility], Jim Willingham patient, Yuliet Bautista PT, Trudi Allen OT, Jessica Burger RN.      Discharge Barriers/Treatment/Education    Rehab Diagnosis: 62 year old male, admitted to [facility] ARU on 6/30/2017 with a long h/o NICM (EF20%, dilated LV, severe MR, Bi V ICD, ho VT), asthma/COPD, CKD, DM2, CECILIA, MDD, ho gastric bypass, admitted to TCU for poor endurance following LVAD Heartmate II placement on 6/19.    Active Medical Co-morbidities/Prognosis: see HPI and all notes.    Safety: Uses call light appropriately. No alarms. Pt is independent with room mobility.    Pain: Sternal/incisional pain. Prn Dilaudid every 3 hours, prn ultram every 6 hours and scheduled Tylenol managing his pain..    Medications, Skin, Tubes/Lines: LVAD driveline site and old chest tube sites healing well. .     Swallowing/Nutrition:    Bowel/Bladder: Continent of bladder, voiding spontaneously. Continent of bowel, last BM 7/3.    Psychosocial: Pt resides with his wife. Pt has several other family members that are planning to provide assistance as needed. Team working towards a safe d/c plan.     ADLs/IADLs: Pt has made good progress with ADLs. Pt is IND standing g/h, toilet transfer/hygiene, and TB dressing. Pt is new LVAD and has plan to set up sponge bathing at home, will benefit from initial supervision with sponge bathing. Pt is IND simple meal prep and household tasks. Pt performance limited by fatigue/SOB, tolerating short periods of standing activity. Pt has been educated on EC/WS techniques and is able to IND incorporate strategies into ADLs/IADLs. Plan for d/c home tomorrow with spouse support first 30 days following d/c. Recommend initial HH OT and then OP cardiac rehab once strength and endurance have improved.    Mobility:  Pt on track for safe d/c home 7/7/17 with prn assist, home PT. Pt has progressed to ind in room, mod I  with 4WW on flat level surfaces up to 150'; mod I on stairs using single rail. Has been well tolerating nustep for strength, endurance and cardiovascular health. Pt will be using 4WW from home. F/u with OP cardiac rehab once activity tolerance improved.    Cognition/Language: Pt is IND with comprehending information and collaborate with rehab staff to problem solve barriers to d/c. Pt is able to recall all recommendations provided by different disciplines. Cognition not a barrier to d/c. Spouse to assist with IADLs at time of d/c.    Community Re-Entry: 4WW at supervision level    Transportation: will need assist, sit in back seat or have passenger airbag disabled d/t sternal precautions    Decision maker: self    Plan of Care and goals reviewed and updated.    Discharge Plan/Recommendations    Fall Precautions: discontinue    Patient/Family input to goals: ok with rehab plan    Anticipated rehab needs following discharge: home RN, PT and OT    Anticipated care giver support after discharge: wife    Estimated length of stay: Friday, 07/07    Overall plan for the patient: as above.      Utilization Review and Continued Stay Justification    Medical Necessity Criteria:    For any criteria that is not met, please document reason and plan for discharge, transfer, or modification of plan of care to address.    Requires intensive rehabilitation program to treat functional deficits?: Yes    Requires 3x per week or greater involvement of rehabilitation physician to oversee rehabilitation program?: Yes    Requires rehabilitation nursing interventions?: Yes    Patient is making functional progress?: Yes    There is a potential for additional functional progress? Yes    Patient is participating in therapy 3 hours per day a minimum of 5 days per week or 15 hours per week in 7 day period?:Yes    Has discharge needs that require coordinated discharge planning approach?:Yes      Barriers/Concerns related to meeting medical  necessity criteria:  none    Team Plan to Address Concern:  na      Final Physician Sign off    Statement of Approval: reviewed and approved. Nabor Franco      Patient Goals      OT target date for goal attainment: 07/07/17  OT Frequency:  min daily  OT goal: hygiene/grooming: independent, while standing MET  OT goal: upper body dressing: Independent MET  OT goal: lower body dressing: Independent, within precautions MET  OT goal: upper body bathing: Independent, within precautions (for sponge bath)  OT goal: lower body bathing: Independent, with precautions (for sponge bath)  OT goal: toilet transfer/toileting: Modified independent MET  OT goal: meal preparation: Modified independent, with simple meal preparation, ambulatory level MET  OT goal: home management: Modified independent, with light demand household tasks, within precautions MET  OT goal: perform aerobic activity with stable cardiovascular response: continuous activity, 15 minutes  Edema Management (OT): Non-compression techniques  OT goal 1: Pt. will demo I with UE HEP using handouts to maintain strength. MET     PT target date for goal attainment: 07/10/17  PT Frequency: 90min/day  PT goal: bed mobility: Independent  PT goal: transfers: Modified independent, Sit to/from stand, Bed to/from chair  PT goal: gait: Modified independent, Greater than 200 feet, Rolling walker  PT goal: stairs: 7 stairs, Modified independent, Rail on right  PT goal: perform aerobic activity with stable cardiovascular response: 10 minutes, continuous activity, ambulation, NuStep  PT goal 1: Pt will be IND with HEP for BLE strengthening upon DC  PT goal 2: following education pt will state 3 or > fall prevention strategies in order to demo knowledge of how to reduce fall risk         Patient Goal:  Pain Management: Patient will demonstrate ability to verbalize pain needs to keep pain manageable during hospitalization and prior to time of d/c.   Goal: Wound Management:  Patient and/or caregiver will demonstrate ability to safely perform LVAD dressing change as indicated prior to time of d/c.    Patient/Family Goal: Medication Management: Pt will demonstrate ability to manage own medication regimen prior to time of d/c with successful completion of MAP                                            Individualized Goal 1: Pt will demonstrate ability to manage LVAD power switch from wall to battery.

## 2017-07-06 NOTE — PLAN OF CARE
Problem: Goal/Outcome  Goal: Goal Outcome Summary  Physical Therapy Discharge Summary     Reason for therapy discharge:    Discharged to home with home therapy.     Progress towards therapy goal(s). See goals on Care Plan in [facility] electronic health record for goal details.  Goals partially met.  Barriers to achieving goals:   discharge from facility.     Therapy recommendation(s):    Continued therapy is recommended.  Rationale/Recommendations:  safety within home environment, progression of HEP for strength and endurance, progression to OP cardiac reahb as activity tolerance progresses.      Pt has 4WW from home           Problem: PT General Care Plan  Goal: Gait (PT)  PT Gait   Gait distance up to ~150' using 4WW, limited d/t fatigue. IND w/o AD within closed environment up to 20'

## 2017-07-06 NOTE — PLAN OF CARE
Problem: Goal/Outcome  Goal: Goal Outcome Summary  Pt is planning to d/c home tomorrow, 7/7 with continued family support and ongoing home care therapy. Placed a referral to [facility] for RN, PT, and OT. Sw will continue to assist as needed. Provided education and attained signature for Medicare IM.

## 2017-07-06 NOTE — PLAN OF CARE
Problem: Goal/Outcome  Goal: Goal Outcome Summary  FOCUS/GOAL  Bowel management, Bladder management, Pain management, Medical management and Mobility     ASSESSMENT, INTERVENTIONS AND CONTINUING PLAN FOR GOAL:  Pt is alert and oriented. Continent of bladder, voiding spontaneously, using urinal overnight. No BM, passing flatus. Pain managed with Dilaudid q 3-4 hours. LVAD parameters within normal limits, no alarms or pain from drive line site. Pt is up Mod I in room. Care conference today at 1000, plan for discharge on Friday. Pt uses call light appropriately, able to make needs known. Will continue with POC.

## 2017-07-06 NOTE — PROGRESS NOTES
IRF-HECTOR CLARIFICATION NOTE FOR ADMIT ASSESSMENT PERIOD  Lowest score for each FIM item supported by available charting    Eating: FIM 7; patient independent  Grooming: FIM 5; patient performing with supervision  Bathing: FIM 5; patient performing sponge bath with supervision  Upper Body Dressing: FIM 4; patient needing CGA to minimal assistance to complete  Lower Body Dressing: FIM 3; for full body dressing, patient requiring moderate assistance; at times needing maximal assistance for socks and shoes  Toileting: FIM 5; patient performing with supervision  Bladder:  FIM 5; patient using urinal with set up assistance  Bladder Number of Accidents: 0  Bowel: FIM 6 due to medications  Bowel Number of Accidents: 0  Transfer: FIM 4; patient needing steadying assistance  Toilet transfer: FIM 5; patient performing with SBA  Tub/shower transfer: FIM 0; activity did not occur due to LVAD  Locomotion distance:  At times less than 50 feet  Locomotion: FIM 1 due to distance  Stairs: FIM 2 due to number of stairs (4)  Comprehension: FIM 7; no deficits  Expression: FIM 7; no deficits  Social Interaction: FIM 6 due to medications  Problem solving: FIM 7; no deficits  Memory: FIM 7; no deficits

## 2017-07-06 NOTE — PROGRESS NOTES
[facility]   Acute Rehabilitation Unit  Daily progress note    interval history  Jim Willingham was seen and examined at bedside. Reports he is feeling well, notes he has significant edema and feels he needs more bumex as was on more prior. Discussed with HF NP and dose was increased.       He also does feel more sob though notes this is typical with humidity and relieved with use of nebs.  He has ongoing pain since surgery that is relieved with dilaudid he feels this is getting better and is trying to wean dilaudid some we discussed decreasing dilaudid dose to 2 mg q 3 hours prn today and further weaning by increasing time intervals between dosing, Jim is agreeable to this plan.  Jim is feeling ready to discharge tomorrow.        See care conference note by Dr. Franco 7/6 for details plan for discharge home with home PT/OT/RN planned for 7/7.  Case discussed with LVAD coordinator MIKY Abdi.          medications    warfarin  3 mg Oral ONCE at 18:00     bumetanide  2 mg Oral BID     magnesium citrate  296 mL Oral Once     ipratropium - albuterol 0.5 mg/2.5 mg/3 mL  3 mL Nebulization 4x daily     acetaminophen  650 mg Oral Q6H     aspirin  81 mg Oral or Feeding Tube Daily     citalopram  20 mg Oral Daily     cyanocobalamin  1,000 mcg Intramuscular Q30 Days     fluticasone-vilanterol  1 puff Inhalation Daily     metFORMIN  250 mg Oral BID w/meals     metoprolol  25 mg Oral BID     montelukast  10 mg Oral At Bedtime     pantoprazole  40 mg Oral QAM     potassium chloride SA  20 mEq Oral BID     senna-docusate  2 tablet Oral BID     traZODone  50 mg Oral At Bedtime     zinc sulfate  220 mg Oral BID     umeclidinium  1 puff Inhalation Daily        artificial saliva, - MEDICATION INSTRUCTIONS -, Warfarin Therapy Reminder, albuterol, HYDROmorphone, ipratropium - albuterol 0.5 mg/2.5 mg/3 mL, polyethylene glycol, traMADol, naloxone, glucose **OR** dextrose **OR** glucagon  "    physical exam  BP (!) 85/71 (BP Location: Right arm)  Pulse 88  Temp 97.9  F (36.6  C) (Oral)  Resp 18  Ht 1.727 m (5' 8\")  Wt 111.9 kg (246 lb 12.8 oz)  SpO2 96%  BMI 37.53 kg/m2  Gen: alert nad   Cardio: lvad hum  Pulm: non labored though mild dyspnea reported, clear diminished  Abd: soft non tender non distended  Ext: mirta hose in place with 2+ pitting edema ble to knees  Neuro/MSK: alert moves all extremities against gravity    labs  Reviewed INR 2.56, Cr 1.32    Rehabilitation - continue comprehensive acute inpatient rehabilitation program with multidisciplinary approach including therapies, rehab nursing, and physiatry following. See interval history for updates.  Plan for discharge home 7/7    assessment and plan  Jim Willingham is a 62 year old man with a past medical history of CKD stage III, DM type 2, CECILIA, Obesity s/p gastric bypass, HTN, Asthma, COPD, NICM with EF 20% who was admitted 6/15 with progressive sob, ultimately admitted to heart failure team and underwent VAD (Heartmate II) placement 6/19.  Postoperatively admitted to CICU, extubated POD 2.  Post operative course relatively uncomplicated, non sustained arrhthymias,  WALTER, small left pleural effusion, decreased strength and activity tolerance. Admitted to ARU 6/30 for ongoing aggressive rehabilitation and medical management.      NICM EF 20% s/p Heartmate II LVAD 6/19- asa 81 mg daily, warfarin with INR 2-3.  LVAD coordinator Rajani Abdi.  SVT 6/23-6/24, NSVT 6/25 MAP goal <90.  Patient and wife have passed LVAD training.   -continue asa, warfarin (appreciate pharmacy assist with dosing).   -sternal precautions  -doppler BP  -daily weights  -daily driveline dressing changes  -continue metoprolol 25 bid  -continue to monitor and record LVAD settings  -increase diuresis 2 mg bumex bid  -daily weights  -continue pain management (incisional pain):scheduled tylenol po prn dilaudid with tramadol for breakthrough- discussed dilaudid taper " patient agreeable to plan  -case discussed with Cate Carcamo NP 7/6.   -follow up as scheduled next visit 7/12 Cate Marshall NP     WALTRE on CKD- CKD stage III with baseline CR 1.4  peak 2.12 during acute hospitalization, Cr. 1.32 7/6.   -avoid nephrotoxic agents  - plan for follow up BMP 7/12.      CECILIA- continue QHS BiPAP     Asthma and COPD-    continuation of PTA BREO ELLIPTA, q 4 hour duo nebs, Singulair 10 mg QHS,      Depression/ insomnia- PTA Celexa, trazodone started during hospitalization for sleep  -continue Celexa  -continue trazodone (with planned taper at discharge)       DM type 2- PTA on metformin, dose reduced due to WALTER on CKD during admission  -continue metformin 250 mg bid-   -glucose monitoring twice daily  -follow up with pcp- consider d/c metformin     Acute post op anemia- hgb stable 8.9  -monitor CBC q Mon         Patient discussed with Dr. Franco  PM&R Staff Physician    Madina Laguna PA-C  Rehab Service  Pager: [phone]

## 2017-07-06 NOTE — DISCHARGE INSTRUCTIONS
HOME CARE  [facility] [phone] will provide RN, PT, OT. They will call you after you discharge to schedule the first visit.     Labs (INR)  to be drawn (on or before 7/9) and faxed to [facility] fax [phone] ATTN: Dr. Delbert Salas phone [phone]      FOLLOW UP APPOINTMENTS:   As scheduled: 7/12, 7/25, 8/11 with  LVAD team with labs prior to visit  8/21 with Dr. Trujillo LVAD surgeon

## 2017-07-07 VITALS
RESPIRATION RATE: 20 BRPM | BODY MASS INDEX: 37.02 KG/M2 | TEMPERATURE: 97.3 F | HEART RATE: 91 BPM | OXYGEN SATURATION: 99 % | SYSTOLIC BLOOD PRESSURE: 85 MMHG | DIASTOLIC BLOOD PRESSURE: 68 MMHG | HEIGHT: 68 IN | WEIGHT: 244.3 LBS

## 2017-07-07 LAB
GLUCOSE BLDC GLUCOMTR-MCNC: 86 MG/DL (ref 70–99)
GLUCOSE BLDC GLUCOMTR-MCNC: 96 MG/DL (ref 70–99)
INR PPP: 2.27 (ref 0.86–1.14)

## 2017-07-07 PROCEDURE — 25000132 ZZH RX MED GY IP 250 OP 250 PS 637: Mod: GY | Performed by: PHYSICIAN ASSISTANT

## 2017-07-07 PROCEDURE — A9270 NON-COVERED ITEM OR SERVICE: HCPCS | Mod: GY | Performed by: HOSPITALIST

## 2017-07-07 PROCEDURE — A9270 NON-COVERED ITEM OR SERVICE: HCPCS | Mod: GY | Performed by: PHYSICIAN ASSISTANT

## 2017-07-07 PROCEDURE — 36415 COLL VENOUS BLD VENIPUNCTURE: CPT | Performed by: PHYSICIAN ASSISTANT

## 2017-07-07 PROCEDURE — 94640 AIRWAY INHALATION TREATMENT: CPT | Mod: 76

## 2017-07-07 PROCEDURE — 94640 AIRWAY INHALATION TREATMENT: CPT

## 2017-07-07 PROCEDURE — 40000275 ZZH STATISTIC RCP TIME EA 10 MIN

## 2017-07-07 PROCEDURE — 25000125 ZZHC RX 250: Performed by: PHYSICAL MEDICINE & REHABILITATION

## 2017-07-07 PROCEDURE — 25000132 ZZH RX MED GY IP 250 OP 250 PS 637: Mod: GY | Performed by: HOSPITALIST

## 2017-07-07 PROCEDURE — 00000146 ZZHCL STATISTIC GLUCOSE BY METER IP

## 2017-07-07 PROCEDURE — 25000125 ZZHC RX 250: Performed by: HOSPITALIST

## 2017-07-07 PROCEDURE — 40000133 ZZH STATISTIC OT WARD VISIT: Performed by: OCCUPATIONAL THERAPIST

## 2017-07-07 PROCEDURE — 85610 PROTHROMBIN TIME: CPT | Performed by: PHYSICIAN ASSISTANT

## 2017-07-07 PROCEDURE — 97535 SELF CARE MNGMENT TRAINING: CPT | Mod: GO | Performed by: OCCUPATIONAL THERAPIST

## 2017-07-07 RX ADMIN — TRAMADOL HYDROCHLORIDE 100 MG: 50 TABLET, COATED ORAL at 02:35

## 2017-07-07 RX ADMIN — CITALOPRAM HYDROBROMIDE 20 MG: 10 TABLET ORAL at 08:03

## 2017-07-07 RX ADMIN — IPRATROPIUM BROMIDE AND ALBUTEROL SULFATE 3 ML: .5; 3 SOLUTION RESPIRATORY (INHALATION) at 02:21

## 2017-07-07 RX ADMIN — SENNOSIDES AND DOCUSATE SODIUM 2 TABLET: 8.6; 5 TABLET ORAL at 08:03

## 2017-07-07 RX ADMIN — POTASSIUM CHLORIDE 20 MEQ: 750 TABLET, FILM COATED, EXTENDED RELEASE ORAL at 08:02

## 2017-07-07 RX ADMIN — IPRATROPIUM BROMIDE AND ALBUTEROL SULFATE 3 ML: .5; 3 SOLUTION RESPIRATORY (INHALATION) at 07:44

## 2017-07-07 RX ADMIN — TRAMADOL HYDROCHLORIDE 100 MG: 50 TABLET, COATED ORAL at 08:09

## 2017-07-07 RX ADMIN — ACETAMINOPHEN 650 MG: 325 TABLET, FILM COATED ORAL at 06:34

## 2017-07-07 RX ADMIN — HYDROMORPHONE HYDROCHLORIDE 2 MG: 2 TABLET ORAL at 10:55

## 2017-07-07 RX ADMIN — METOPROLOL TARTRATE 25 MG: 25 TABLET, FILM COATED ORAL at 08:03

## 2017-07-07 RX ADMIN — UMECLIDINIUM 1 PUFF: 62.5 AEROSOL, POWDER ORAL at 08:05

## 2017-07-07 RX ADMIN — ASPIRIN 81 MG 81 MG: 81 TABLET ORAL at 08:02

## 2017-07-07 RX ADMIN — HYDROMORPHONE HYDROCHLORIDE 2 MG: 2 TABLET ORAL at 02:35

## 2017-07-07 RX ADMIN — FLUTICASONE FUROATE AND VILANTEROL TRIFENATATE 1 PUFF: 200; 25 POWDER RESPIRATORY (INHALATION) at 08:04

## 2017-07-07 RX ADMIN — Medication 250 MG: at 08:02

## 2017-07-07 RX ADMIN — HYDROMORPHONE HYDROCHLORIDE 2 MG: 2 TABLET ORAL at 06:34

## 2017-07-07 RX ADMIN — ZINC SULFATE CAP 220 MG (50 MG ELEMENTAL ZN) 220 MG: 220 (50 ZN) CAP at 08:02

## 2017-07-07 RX ADMIN — BUMETANIDE 2 MG: 1 TABLET ORAL at 08:03

## 2017-07-07 RX ADMIN — PANTOPRAZOLE SODIUM 40 MG: 40 TABLET, DELAYED RELEASE ORAL at 08:03

## 2017-07-07 NOTE — PLAN OF CARE
Problem: Goal/Outcome  Goal: Goal Outcome Summary  Occupational Therapy Discharge Summary     Reason for therapy discharge:    Discharged to home with home therapy.     Progress towards therapy goal(s). See goals on Care Plan in [facility] electronic health record for goal details.  Goals met     Therapy recommendation(s):    Continued therapy is recommended.  Rationale/Recommendations:  ADLs/IADLs, EC/WS, functional endurance, activity tolerance. Recommend initial HHC OT and then progression to OP cardiac rehab once endurance has improved.     Summary: Pt is a 62 yr old male with PMHx of CKD stage III, DM2, CECILIA, obesity, HTN, asthma, COPD, NICM admitted to [facility] ARU 6/30 following hospital stay for LVAD (heartmate II) placement 6/19. Demonstrated decreased strength and activity tolerance impacting performance with ADLs/mobility. Current level of performance is as follows.  ADLs/IADLs: Pt is IND functional transfers and short distance mobility no AD. Pt using 4ww for longer distance mobility and IADLs. Pt is IND standing g/h, toilet transfer/hygiene, and TB dressing. Pt is new LVAD and has plan to set up sponge bathing at home, will not require supervision with this at home. Pt is IND simple meal prep and household tasks. Pt performance limited by fatigue/SOB, tolerating short periods of standing activity. Pt has been educated on EC/WS techniques and is able to IND incorporate strategies into ADLs/IADLs.   Cognition: Pt is IND with comprehending information and collaborates with rehab staff to problem solve barriers to d/c. Pt is able to recall all recommendations provided by different disciplines. Cognition not a barrier to d/c.  HEP: Pt has been issued resisted t-band HEP (yellow) and CVC BUE HEP. Pt demonstrating IND with both programs.   Caregiver support: Pt to return home with 24/7 support from spouse for first 30 days. Spouse to assist with IADLs as needed.

## 2017-07-07 NOTE — PLAN OF CARE
Problem: Goal/Outcome  Goal: Goal Outcome Summary  FOCUS/GOAL  Discharge planning     ASSESSMENT, INTERVENTIONS AND CONTINUING PLAN FOR GOAL:  Pt is reviewing his discharge paperwork, expecting to discharge around 12:00 when wive arrives. Anticipate no issues.

## 2017-07-07 NOTE — PLAN OF CARE
Problem: Goal/Outcome  Goal: Goal Outcome Summary  Outcome: Improving  FOCUS/GOAL  Bladder management, Pain management and Discharge planning     ASSESSMENT, INTERVENTIONS AND CONTINUING PLAN FOR GOAL:  Pt has been sleeping well through the night, and has been wearing his Bi-pap, which he manages independently. Pt voids per urinal at night, and staff emptied for pt. Pt administered PRN Dilaudid and Ultram for pain management. Pt's LVAD functioning WNP, and no alarms. Pt is independent with power source changes. Pt will be discharging to home today. Pt's LVAD coordinator to deliver dressing change kits to floor early today  for pt's discharge, as that is preferred by pt's wife.

## 2017-07-07 NOTE — PLAN OF CARE
Problem: Goal/Outcome  Goal: Goal Outcome Summary  FOCUS/GOAL  Discharge planning     ASSESSMENT, INTERVENTIONS AND CONTINUING PLAN FOR GOAL:  Pt here with LVAD and required therapy and rehab with education before returning home safely.  He will be discharging in am.  Day charge contacted on-call LVAD coordinator (Ismael) as the patients coordinator was not responding (Rajani).  The patient requires the LVAD dressing kits as his wife is uncomfortable performing the procedure without them and does not want to use loose supplies.  I contacted on-call coordinator Leonel and he informed me that he knew nothing about it.  However, he assured me that someone (most jose Gerard) would have the kits here by 10am. He will discharge tomorrow and we will need to get kits from [place] as I exhausted all resources on the [employer].   He was independent with all cares including hygiene (washed up at sink), oral cares, changed to wall-power, had BM and did own jason-care, etc.  Writer asked that LVAD team call us in am to assure us the kits will be here before d/c.

## 2017-07-07 NOTE — PROGRESS NOTES
D: Stopped by to see patient. No VAD related questions or concerns at this time. Reviewed with patient set-up of equipment once he gets home. Provided a supply of dressing change kits to get through the weekend until [facility] gets a shipment to patient's home. Provided support and listened to patient and care giver's thoughts and concerns.  P: Continue to follow patient and address any questions or concerns patient and or caregiver may have.

## 2017-07-07 NOTE — DISCHARGE SUMMARY
[facility]   Acute Rehabilitation Unit  Discharge summary     Date of Admission: 6/30/2017  Date of Discharge: 7/7/2017  Disposition: home  Primary Care Physician: Jovany Salas  Attending physician: Nabor Franco MD    discharge diagnosis  NICM s/p lvad  ckd  Copd  Asthma  DM type 2  Acute blood loss anemia  ckd  Depression  insomnia    brief summary  Jim Willingham is a 62 year old man with a past medical history of CKD stage III, DM type 2, CECILIA, Obesity s/p gastric bypass, HTN, Asthma, COPD, NICM with EF 20% who was admitted 6/15 with progressive sob, ultimately admitted to heart failure team and underwent VAD (Heartmate II) placement 6/19.  Postoperatively admitted to CICU, extubated POD 2.  Post operative course relatively uncomplicated, non sustained arrhthymias,  WALTER, small left pleural effusion, decreased strength and activity tolerance. Admitted to ARU 6/30 for ongoing aggressive rehabilitation and medical management.      rehabilitaiton course  ADLs/IADLs: Pt has made good progress with ADLs. Pt is IND standing g/h, toilet transfer/hygiene, and TB dressing. Pt is new LVAD and has plan to set up sponge bathing at home, will benefit from initial supervision with sponge bathing. Pt is IND simple meal prep and household tasks. Pt performance limited by fatigue/SOB, tolerating short periods of standing activity. Pt has been educated on EC/WS techniques and is able to IND incorporate strategies into ADLs/IADLs. Plan for d/c home tomorrow with spouse support first 30 days following d/c. Recommend initial HH OT and then OP cardiac rehab once strength and endurance have improved.     Mobility:  Pt on track for safe d/c home 7/7/17 with prn assist, home PT. Pt has progressed to ind in room, mod I with 4WW on flat level surfaces up to 150'; mod I on stairs using single rail. Has been well tolerating nustep for strength, endurance and cardiovascular health. Pt will be  using 4WW from home. F/u with OP cardiac rehab once activity tolerance improved.    mEDICAL COURSE  NICM EF 20% s/p Heartmate II LVAD 6/19- asa 81 mg daily, warfarin with INR 2-3.  LVAD coordinator Rajani Abdi.  SVT 6/23-6/24, NSVT 6/25 MAP goal <90.  Patient and wife have passed LVAD training.   -continue asa, warfarin- plan for INR on or before 7/9 with results faxed to Dr. Salas PCP.   -continue daily weights  -daily driveline dressing changes  -continue metoprolol 25 bid  -continue to monitor and record LVAD settings  -continue 2 mg bumex bid (dose increased 7/6)  -continue pain management (incisional pain):scheduled tylenol po prn dilaudid with tramadol for breakthrough- discussed dilaudid taper patient agreeable to plan  -case discussed with Cate Carcamo NP 7/6.   -follow up as scheduled next visit 7/12 Cate Marshall NP      WALTER on CKD- CKD stage III with baseline CR 1.4  peak 2.12 during acute hospitalization, Cr. 1.32 7/6.   -avoid nephrotoxic agents  - plan for follow up BMP 7/12.       CECILIA- continue QHS BiPAP      Asthma and COPD-    continue of PTA BREO ELLIPTA, q 4 hour duo nebs, Singulair 10 mg QHS,       Depression/ insomnia- PTA Celexa, trazodone started during hospitalization for sleep  -continue Celexa  -continue trazodone (with planned taper at discharge)     DM type 2- PTA on metformin, dose reduced due to WALTER on CKD during admission  -continue metformin 250 mg bid-   -glucose monitoring twice daily  -follow up with pcp- consider d/c metformin      Acute post op anemia- hgb stable 8.9  -monitor CBC q Mon     dISCHARGE MEDICATIONS  Current Discharge Medication List      CONTINUE these medications which have CHANGED    Details   aspirin 81 MG chewable tablet 1 tablet (81 mg) by Oral or Feeding Tube route daily  Qty: 36 tablet    Associated Diagnoses: LVAD (left ventricular assist device) present (H)      metFORMIN (GLUCOPHAGE) 500 MG tablet Take 0.5 tablets (250 mg) by mouth 2 times daily  (with meals)  Qty: 60 tablet, Refills: 0    Associated Diagnoses: 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, without long-term current use of insulin, unspecified CKD stage (H)      pantoprazole (PROTONIX) 40 MG EC tablet Take 1 tablet (40 mg) by mouth every morning  Qty: 30 tablet, Refills: 0    Associated Diagnoses: Acute post-operative pain      potassium chloride SA (K-DUR/KLOR-CON M) 20 MEQ CR tablet Take 1 tablet (20 mEq) by mouth 2 times daily  Qty: 60 tablet, Refills: 0    Associated Diagnoses: LVAD (left ventricular assist device) present (H)      senna-docusate (SENOKOT-S;PERICOLACE) 8.6-50 MG per tablet Take 2 tablets by mouth 2 times daily as needed for constipation  Qty: 100 tablet, Refills: 0    Associated Diagnoses: Acute post-operative pain      traZODone (DESYREL) 50 MG tablet Take 50 mg by mouth nightly x one week then reduce to 25 mg (1/2 tab) by mouth nightly x one week then stop.  Qty: 11 tablet, Refills: 0    Associated Diagnoses: Acute post-operative pain; LVAD (left ventricular assist device) present (H)      warfarin (COUMADIN) 3 MG tablet Use as directed.  Qty: 30 tablet, Refills: 0    Associated Diagnoses: LVAD (left ventricular assist device) present (H)      HYDROmorphone (DILAUDID) 2 MG tablet Take 1 tablet (2 mg) by mouth every 4 hours as needed for moderate to severe pain  Qty: 45 tablet, Refills: 0    Associated Diagnoses: Acute post-operative pain         CONTINUE these medications which have NOT CHANGED    Details   traMADol (ULTRAM) 50 MG tablet Take 2 tablets (100 mg) by mouth every 6 hours as needed for moderate pain or breakthrough pain  Qty: 28 tablet    Associated Diagnoses: Acute post-operative pain      acetaminophen (TYLENOL) 325 MG tablet Take 2 tablets (650 mg) by mouth every 6 hours  Qty: 100 tablet    Associated Diagnoses: Acute post-operative pain      metoprolol (LOPRESSOR) 25 MG tablet Take 1 tablet (25 mg) by mouth 2 times  daily  Qty: 60 tablet    Associated Diagnoses: LVAD (left ventricular assist device) present (H); Paroxysmal atrial fibrillation (H)      ipratropium - albuterol 0.5 mg/2.5 mg/3 mL (DUONEB) 0.5-2.5 (3) MG/3ML neb solution Take 3 mLs by nebulization every 4 hours as needed for shortness of breath / dyspnea or wheezing      citalopram (CELEXA) 20 MG tablet Take 20 mg by mouth daily      fluticasone-vilanterol (BREO ELLIPTA) 200-25 MCG/INH oral inhaler Inhale 1 puff into the lungs daily      montelukast (SINGULAIR) 10 MG tablet Take 10 mg by mouth At Bedtime      bumetanide (BUMEX) 2 MG tablet Take 1 tablet (2 mg) by mouth daily    Associated Diagnoses: LVAD (left ventricular assist device) present (H)      polyethylene glycol (MIRALAX/GLYCOLAX) Packet Take 17 g by mouth daily as needed for constipation (for no bm in 1 day)  Qty: 7 packet    Associated Diagnoses: Acute post-operative pain      albuterol (ALBUTEROL) 108 (90 BASE) MCG/ACT Inhaler Inhale 2 puffs into the lungs every 4 hours as needed for shortness of breath / dyspnea or wheezing      cyanocobalamin (VITAMIN B12) 1000 MCG/ML injection Inject 1,000 mcg into the muscle every 30 days      umeclidinium (INCRUSE ELLIPTA) 62.5 MCG/INH oral inhaler Inhale 1 puff into the lungs daily      zinc sulfate (ZINCATE) 220 (50 ZN) MG capsule Take 220 mg by mouth 2 times daily             DISCHARGE INSTRUCTIONS AND FOLLOW UP    Discharge Procedure Orders  Home care nursing referral   Referral Type: Home Health Therapies & Aides     Home Care PT Referral for Hospital Discharge   Referral Type: Home Health Therapies & Aides     Home Care OT Referral for Hospital Discharge     Reason for your hospital stay   Order Comments: Rehab after LVAD placement to regain function.     MD face to face encounter   Order Comments: Documentation of Face to Face and Certification for Home Health Services    I certify that patient: Jim Willingham is under my care and that I, or a nurse  practitioner or physician's assistant working with me, had a face-to-face encounter that meets the physician face-to-face encounter requirements with this patient on: 7/6/2017.    This encounter with the patient was in whole, or in part, for the following medical condition, which is the primary reason for home health care: LVAD placement and significant reduction in mobility.    I certify that, based on my findings, the following services are medically necessary home health services: Nursing, Occupational Therapy and Physical Therapy.    My clinical findings support the need for the above services because: Nurse is needed: For complex aftercare of surgical procedures (LVAD placement) because the patient needs follow up and instruction as needed and RN will also obtain INR and contact PCP with results for dosing of Warfarin.    Further, I certify that my clinical findings support that this patient is homebound (i.e. absences from home require considerable and taxing effort and are for medical reasons or Denominational services or infrequently or of short duration when for other reasons) because: Requires assistance of another person or specialized equipment to access medical services because patient: Is unable to walk greater than about 100 feet without rest...    Based on the above findings. I certify that this patient is confined to the home and needs intermittent skilled nursing care, physical therapy and/or speech therapy.  The patient is under my care, and I have initiated the establishment of the plan of care.  This patient will be followed by a physician who will periodically review the plan of care.  Physician/Provider to provide follow up care: No Ref-Primary, Physician    Attending hospital physician (the Medicare certified [facility] provider): Nabor Franco  Physician Signature: See electronic signature associated with these discharge orders.  Date: 7/6/2017     Follow Up and recommended labs and tests   Order  Comments: Follow up with primary care provider, Physician No Ref-Primary, within 1-2 days to follow up on results.  The following labs/tests are recommended: INR to be drawn by home RN. Formerly named [facility] [address]. Yuan.     Follow Up and recommended labs and tests   Order Comments: INR on or before 7/9 to be drawn per home RN results faxed to Dr[facility] Delbert Salas at [facility] ([phone]) phone [phone]  Remainder of labs per LVAD team, prior to appointments     Activity   Order Comments: Continue sternal precautions, no heavy lifting, no showering until directed by LVAD team, up with walker.   Order Specific Question Answer Comments   Is discharge order? Yes      Monitor and record   Order Comments: weight every day- if weight change >2 lbs in one day or >5 lbs in one week contact LVAD coordinator     Full Code     Full Code     Diet   Order Comments: Follow this diet upon discharge:   Regular Diet Adult; Thin Liquids (water, ice chips, juice, milk, gelatin, ice cream, etc) + supplements (Ensure Clear/ pro-stat as desired)   Order Specific Question Answer Comments   Is discharge order? Yes           physical examination    Most recent Vital Signs:   Vitals:    07/06/17 2100 07/07/17 0630 07/07/17 0744 07/07/17 0755   BP:    (!) 85/68   BP Location: Right arm   Right arm   Pulse:    91   Resp:    20   Temp:    97.3  F (36.3  C)   TempSrc:    Oral   SpO2:   98% 99%   Weight:  110.8 kg (244 lb 4.8 oz)     Height:       General: alert nad  CV: lvad hum  Resp: non labored clear   Ab: soft non distended non tender  Extremities: warm dry 1-2+ ble edema with mirta hose in place  Skin: incision cdi dried drainage    45 minutes spent in discharge, including >50% in counseling and coordination of care, medication review and plan of care recommended on follow up.     Patient was evaluated on day of discharge by attending physician, Nabor Franco MD, who agrees with plan of care.    Discharge summary was forwarded to  Jovany Salas (PCP) at the time of discharge, so as to bridge from hospital to outpatient care.     It was our pleasure to care for Jim Willingham during this hospitalization. Please do not hesitate to contact me should there be questions regarding the hospital course or discharge plan.          Madina Laguna PA-C   Rehab Medicine Service  [phone]    I, Dr. Franco, have seen and examined the patient. The patient is ready for discharge home today. I have reviewed the above discharge summary and agree with content.  Total time: >30 min    ABDIEL Franco MD

## 2017-07-07 NOTE — PROGRESS NOTES
[facility]   Met with pt to discuss plans for HC.  Pt to be discharged home 07 07 17   and has agreed to have [facility] follow with services of SN, PT and OT. Patient care support center processing referral.  Pt verbalized understanding that initial visit is scheduled for  07 09 17  Pt has 24 hour phone number for [facility] for any questions or concerns.

## 2017-07-09 ENCOUNTER — CARE COORDINATION (OUTPATIENT)
Dept: CARDIOLOGY | Facility: CLINIC | Age: 62
End: 2017-07-09

## 2017-07-09 LAB — INR PPP: 1.9

## 2017-07-10 ENCOUNTER — ANTICOAGULATION THERAPY VISIT (OUTPATIENT)
Dept: ANTICOAGULATION | Facility: CLINIC | Age: 62
End: 2017-07-10

## 2017-07-10 ENCOUNTER — TELEPHONE (OUTPATIENT)
Dept: PHARMACY | Facility: OTHER | Age: 62
End: 2017-07-10

## 2017-07-10 ENCOUNTER — CARE COORDINATION (OUTPATIENT)
Dept: CARDIOLOGY | Facility: CLINIC | Age: 62
End: 2017-07-10

## 2017-07-10 DIAGNOSIS — Z95.811 LVAD (LEFT VENTRICULAR ASSIST DEVICE) PRESENT (H): ICD-10-CM

## 2017-07-10 DIAGNOSIS — Z79.01 LONG-TERM (CURRENT) USE OF ANTICOAGULANTS: ICD-10-CM

## 2017-07-10 NOTE — TELEPHONE ENCOUNTER
MTM referral from: Transitions of Care (recent hospital discharge or ED visit)    MTM referral outreach attempt #1 on July 10, 2017 at 10:16 AM      Outcome: Patient is not interested at this time because they are not a [facility] patient. Emailed [facility] contact with referral info for follow up, will route to MTM Pharmacist/Provider as an FYI. Thank you for the referral.     Tara Flores MTM Coordinator

## 2017-07-10 NOTE — PROGRESS NOTES
Dates of hospitalization: 6/30 to 7/7  Reason for hospitalization: LVAD placement  Procedures performed: LVAD placed  Vitamin K or FFP administered? no  Inpatient warfarin doses added to calendar? yes  Medication changes at discharge: none  Warfarin dosing after DC: 3mg  Patient discharged on Lovenox? no  Next INR date: 7/12  Where is the patient discharging to? (home, TCU, staying locally, etc.): home  Will patient have home care? Yes. [facility].    Patient has been on coumadin since 2008, and has coumadin 5mg tablets at home as well as the 3mg tablets.  Home Lab will be [facility] in [place].  Dr. Salas was faxed the INR result from 7/9.  Today, Dr. Salas's nurse gave the recommendation, but Rajani explained to the patient that in the future, our clinic will be managing it.    ANTICOAGULATION FOLLOW-UP CLINIC VISIT    Patient Name:  Jim Willingham  Date:  7/10/2017  Contact Type:  Telephone    SUBJECTIVE:     Patient Findings     Comments See progress note.  Patient increase aspirin from 81mg daily to 325mg Monday and Tuesday until his INR is again in the therapeutic range.           OBJECTIVE    INR   Date Value Ref Range Status   07/09/2017 1.9  Final       ASSESSMENT / PLAN  INR assessment SUB    Recheck INR In: 3 DAYS    INR Location Homecare INR      Anticoagulation Summary as of 7/10/2017     INR goal 2.0-3.0   Today's INR 1.9! (7/9/2017)   Maintenance plan No maintenance plan   Full instructions 7/10: 4.5 mg; 7/11: 3 mg; Otherwise No maintenance plan   Next INR check 7/12/2017   Target end date     Indications   LVAD (left ventricular assist device) present (H) [Z95.811]  Long-term (current) use of anticoagulants [Z79.01] [Z79.01]         Anticoagulation Episode Summary     INR check location     Preferred lab     Send INR reminders to [facility]    Comments HIPPA Forms mailed 6/26/17      Anticoagulation Care Providers     Provider Role Specialty Phone number    Delisa Montgomery MD  Responsible Cardiology [phone]            See the Encounter Report to view Anticoagulation Flowsheet and Dosing Calendar (Go to Encounters tab in chart review, and find the Anticoagulation Therapy Visit)    Spoke with patient and Rajani LVAD nurse.    Delisa Hillman RN

## 2017-07-10 NOTE — MR AVS SNAPSHOT
Jim Willingham   7/10/2017   Anticoagulation Therapy Visit    Description:  62 year old male   Provider:  Delisa Hillman, RN   Department:  [facility]           INR as of 7/10/2017     Today's INR 1.9! (7/9/2017)      Anticoagulation Summary as of 7/10/2017     INR goal 2.0-3.0   Today's INR 1.9! (7/9/2017)   Full instructions 7/10: 4.5 mg; 7/11: 3 mg; Otherwise No maintenance plan   Next INR check 7/12/2017    Indications   LVAD (left ventricular assist device) present (H) [Z95.811]  Long-term (current) use of anticoagulants [Z79.01] [Z79.01]         July 2017 Details    Sun Mon Tue Wed Thu Fri Sat           1                 2               3               4               5               6               7               8                 9               10      4.5 mg   See details      11      3 mg         12            13               14               15                 16               17               18               19               20               21               22                 23               24               25               26               27               28               29                 30               31                     Date Details   07/10 This INR check       Date of next INR:  7/12/2017         How to take your warfarin dose     To take:  3 mg Take 1 of the 3 mg tablets.    To take:  4.5 mg Take 1.5 of the 3 mg tablets.

## 2017-07-10 NOTE — PROGRESS NOTES
Called pt to check in 72hr after d/c from rehab. Pt reports he feels well and is getting around the house without difficulty. Pt did report occasionally lightheadedness with standing. Encouraged slow position changes and using walker for safety.   Reviewed with pt coumadin dosing and INR checks. [facility] is following pt for coumadin dosing. Home health care is drawing INR's currently, and when home health care no longer needed, pt will go to home clinic for draws. Called Coumadin Clinic and verified that pt was discharged and needs to be followed. Updated Coumadin clinic that pt's INR was drawn yesterday and results faxed to PCP and that pt received dosing instructions, including to re-draw on 7/12.

## 2017-07-10 NOTE — PROGRESS NOTES
D:  Called pt to check in 48hrs discharge from rehab.  Pt states his VAD parameters are at baseline and his weight is stable.  He is doing well since discharge.  He feels comfortable w/ his VAD equipment and does not have concerns at this time.  Reports the home health nurse was there to do an INR today.  He was reporting that his INR results were going to be faxed to his PCP.  I:  Questioned whether [facility] Coumadin clinic would be following his INR.  Encouraged pt to call VAD coordinator on call with any questions or concerns.  A:  Pt was unsure.  P:  Will follow up with pt's primary VAD coordinator to clarify INR management.

## 2017-07-11 DIAGNOSIS — Z95.811 LVAD (LEFT VENTRICULAR ASSIST DEVICE) PRESENT (H): ICD-10-CM

## 2017-07-11 DIAGNOSIS — I50.22 CHRONIC SYSTOLIC CONGESTIVE HEART FAILURE (H): Primary | ICD-10-CM

## 2017-07-12 ENCOUNTER — ANTICOAGULATION THERAPY VISIT (OUTPATIENT)
Dept: ANTICOAGULATION | Facility: CLINIC | Age: 62
End: 2017-07-12

## 2017-07-12 ENCOUNTER — OFFICE VISIT (OUTPATIENT)
Dept: CARDIOLOGY | Facility: CLINIC | Age: 62
End: 2017-07-12
Attending: NURSE PRACTITIONER
Payer: COMMERCIAL

## 2017-07-12 VITALS
SYSTOLIC BLOOD PRESSURE: 72 MMHG | OXYGEN SATURATION: 96 % | HEART RATE: 96 BPM | WEIGHT: 243.8 LBS | BODY MASS INDEX: 36.95 KG/M2 | TEMPERATURE: 98.3 F | HEIGHT: 68 IN

## 2017-07-12 DIAGNOSIS — I50.22 CHRONIC SYSTOLIC CONGESTIVE HEART FAILURE (H): Primary | ICD-10-CM

## 2017-07-12 DIAGNOSIS — N18.30 CKD (CHRONIC KIDNEY DISEASE) STAGE 3, GFR 30-59 ML/MIN (H): ICD-10-CM

## 2017-07-12 DIAGNOSIS — I42.8 NICM (NONISCHEMIC CARDIOMYOPATHY) (H): ICD-10-CM

## 2017-07-12 DIAGNOSIS — I42.8 NICM (NONISCHEMIC CARDIOMYOPATHY) (H): Primary | ICD-10-CM

## 2017-07-12 DIAGNOSIS — I48.0 PAROXYSMAL ATRIAL FIBRILLATION (H): ICD-10-CM

## 2017-07-12 DIAGNOSIS — Z79.01 LONG-TERM (CURRENT) USE OF ANTICOAGULANTS: ICD-10-CM

## 2017-07-12 DIAGNOSIS — N18.30 CHRONIC KIDNEY DISEASE, STAGE III (MODERATE) (H): ICD-10-CM

## 2017-07-12 DIAGNOSIS — Z95.811 LVAD (LEFT VENTRICULAR ASSIST DEVICE) PRESENT (H): ICD-10-CM

## 2017-07-12 DIAGNOSIS — I10 BENIGN ESSENTIAL HYPERTENSION: ICD-10-CM

## 2017-07-12 DIAGNOSIS — I50.22 CHRONIC SYSTOLIC CONGESTIVE HEART FAILURE (H): ICD-10-CM

## 2017-07-12 LAB
ALBUMIN SERPL-MCNC: 3.4 G/DL (ref 3.4–5)
ALP SERPL-CCNC: 84 U/L (ref 40–150)
ALT SERPL W P-5'-P-CCNC: 18 U/L (ref 0–70)
ANION GAP SERPL CALCULATED.3IONS-SCNC: 6 MMOL/L (ref 3–14)
AST SERPL W P-5'-P-CCNC: 24 U/L (ref 0–45)
BILIRUB SERPL-MCNC: 0.6 MG/DL (ref 0.2–1.3)
BUN SERPL-MCNC: 17 MG/DL (ref 7–30)
CALCIUM SERPL-MCNC: 9 MG/DL (ref 8.5–10.1)
CHLORIDE SERPL-SCNC: 102 MMOL/L (ref 94–109)
CO2 SERPL-SCNC: 30 MMOL/L (ref 20–32)
CREAT SERPL-MCNC: 1.38 MG/DL (ref 0.66–1.25)
ERYTHROCYTE [DISTWIDTH] IN BLOOD BY AUTOMATED COUNT: 14.5 % (ref 10–15)
GFR SERPL CREATININE-BSD FRML MDRD: 53 ML/MIN/1.7M2
GLUCOSE SERPL-MCNC: 97 MG/DL (ref 70–99)
HCT VFR BLD AUTO: 31.8 % (ref 40–53)
HGB BLD-MCNC: 9.5 G/DL (ref 13.3–17.7)
INR PPP: 2.1 (ref 0.86–1.14)
LDH SERPL L TO P-CCNC: 300 U/L (ref 85–227)
MCH RBC QN AUTO: 27.4 PG (ref 26.5–33)
MCHC RBC AUTO-ENTMCNC: 29.9 G/DL (ref 31.5–36.5)
MCV RBC AUTO: 92 FL (ref 78–100)
PLATELET # BLD AUTO: 292 10E9/L (ref 150–450)
POTASSIUM SERPL-SCNC: 4.5 MMOL/L (ref 3.4–5.3)
PROT SERPL-MCNC: 7.4 G/DL (ref 6.8–8.8)
RBC # BLD AUTO: 3.47 10E12/L (ref 4.4–5.9)
SODIUM SERPL-SCNC: 138 MMOL/L (ref 133–144)
WBC # BLD AUTO: 6.9 10E9/L (ref 4–11)

## 2017-07-12 PROCEDURE — 36415 COLL VENOUS BLD VENIPUNCTURE: CPT | Performed by: NURSE PRACTITIONER

## 2017-07-12 PROCEDURE — 99215 OFFICE O/P EST HI 40 MIN: CPT | Mod: 25,ZF

## 2017-07-12 PROCEDURE — 83615 LACTATE (LD) (LDH) ENZYME: CPT | Performed by: NURSE PRACTITIONER

## 2017-07-12 PROCEDURE — 99214 OFFICE O/P EST MOD 30 MIN: CPT | Mod: 25 | Performed by: NURSE PRACTITIONER

## 2017-07-12 PROCEDURE — 85027 COMPLETE CBC AUTOMATED: CPT | Performed by: NURSE PRACTITIONER

## 2017-07-12 PROCEDURE — 80053 COMPREHEN METABOLIC PANEL: CPT | Performed by: NURSE PRACTITIONER

## 2017-07-12 PROCEDURE — 85610 PROTHROMBIN TIME: CPT | Performed by: NURSE PRACTITIONER

## 2017-07-12 PROCEDURE — 93750 INTERROGATION VAD IN PERSON: CPT | Mod: ZF | Performed by: NURSE PRACTITIONER

## 2017-07-12 PROCEDURE — 93289 INTERROG DEVICE EVAL HEART: CPT | Mod: 26 | Performed by: INTERNAL MEDICINE

## 2017-07-12 PROCEDURE — 93284 PRGRMG EVAL IMPLANTABLE DFB: CPT | Mod: ZF

## 2017-07-12 RX ORDER — SPIRONOLACTONE 25 MG/1
12.5 TABLET ORAL DAILY
Qty: 45 TABLET | Refills: 3 | Status: SHIPPED | OUTPATIENT
Start: 2017-07-12 | End: 2017-07-25

## 2017-07-12 RX ORDER — METOPROLOL TARTRATE 25 MG/1
25 TABLET, FILM COATED ORAL 2 TIMES DAILY
Qty: 180 TABLET | Refills: 3 | Status: SHIPPED | OUTPATIENT
Start: 2017-07-12 | End: 2017-07-12

## 2017-07-12 RX ORDER — POTASSIUM CHLORIDE 750 MG/1
20 TABLET, EXTENDED RELEASE ORAL 2 TIMES DAILY
Qty: 360 TABLET | Refills: 3 | Status: SHIPPED | OUTPATIENT
Start: 2017-07-12 | End: 2017-07-12

## 2017-07-12 RX ORDER — METOPROLOL SUCCINATE 25 MG/1
25 TABLET, EXTENDED RELEASE ORAL 2 TIMES DAILY
Qty: 180 TABLET | Refills: 3 | Status: SHIPPED | OUTPATIENT
Start: 2017-07-12 | End: 2018-04-20

## 2017-07-12 ASSESSMENT — PAIN SCALES - GENERAL: PAINLEVEL: NO PAIN (0)

## 2017-07-12 NOTE — PATIENT INSTRUCTIONS
1. Start taking aldactone 12.5mg daily.  2. Stop taking potassium.  3. Start taking metoprolol XL 25mg twice daily and stop your current metoprolol.  4. Have your labs checked in 1 week.  5. Follow-up as scheduled on July 25th.

## 2017-07-12 NOTE — PROGRESS NOTES
ANTICOAGULATION FOLLOW-UP CLINIC VISIT    Patient Name:  Jim Willingham  Date:  7/12/2017  Contact Type:  Telephone    SUBJECTIVE:     Patient Findings     Positives No Problem Findings           OBJECTIVE    INR   Date Value Ref Range Status   07/12/2017 2.10 (H) 0.86 - 1.14 Final       ASSESSMENT / PLAN  INR assessment THER    Recheck INR In: 2 DAYS    INR Location Clinic      Anticoagulation Summary as of 7/12/2017     INR goal 2.0-3.0   Today's INR 2.10   Maintenance plan No maintenance plan   Full instructions 7/12: 4.5 mg; 7/13: 4.5 mg; Otherwise No maintenance plan   Next INR check 7/14/2017   Target end date     Indications   LVAD (left ventricular assist device) present (H) [Z95.811]  Long-term (current) use of anticoagulants [Z79.01] [Z79.01]         Anticoagulation Episode Summary     INR check location     Preferred lab     Send INR reminders to [facility]    Comments HIPPA Forms mailed 6/26/17  [facility] [phone]      Anticoagulation Care Providers     Provider Role Specialty Phone number    Delisa Montgomery MD [facility] Cardiology [phone]            See the Encounter Report to view Anticoagulation Flowsheet and Dosing Calendar (Go to Encounters tab in chart review, and find the Anticoagulation Therapy Visit)    Talked with the patient about INR lab result, recommendation, and to check his INR on 07/14/17. Asked the patient to call us if he has any signs and symptoms of clotting and bleeding. Asked the patient to call if he has any questions or concerns.    Duncan Ngo, Pharm D3  [facility]

## 2017-07-12 NOTE — LETTER
7/12/2017      RE: Jim Willingham  [address] [place] [id number]       Dear Colleague,    Thank you for the opportunity to participate in the care of your patient, Jim Willingham, at the [facility] at [facility]. Please see a copy of my visit note below.    HPI: Jim Willingham is a 62 year old male with a past medical history of NICM (EF 20%) s/p HM II LVAD placement (6/19/17) complicated by arrhythmias, WALTER, and small left pleural effusion, CKD Stage III, DM Type II, CECILIA, obesity, HTN, COPD, and asthma who is here for heart failure and LVAD follow up.      Jim states that overall he is feeling well.  He still feels fatigued overall and continues to have RESENDIZ after walking a couple of blocks.  His strength and stamina are improving.  He is no longer having positional lightheadedness since increasing his fluid and food intake.  Denies SOB, CP, PND, orthopnea, edema, weight gain, or dizziness.       No driveline concerns.  Driveline site is not infected.  No drainage.  No hematuria, hematochezia, epistaxis, melena, HA, or neurological changes.  No VAD alarms.     PAST MEDICAL HISTORY:  Past Medical History:   Diagnosis Date     Benign essential hypertension 5/11/2017     Cardiomyopathy, unspecified (H) 5/8/2017     CKD (chronic kidney disease) stage 3, GFR 30-59 ml/min 5/11/2017     Depression 5/11/2017     Diabetes mellitus (H) 5/11/2017     H/O gastric bypass 5/11/2017     ICD (implantable cardioverter-defibrillator), biventricular, in situ 5/11/2017     NICM (nonischemic cardiomyopathy) (H)/ EF 20% 5/11/2017    ECHO: LVEDd. 7.66 cm, Restrictive pattern , Severe mitral valve regurgitation     CECILIA (obstructive sleep apnea) 5/11/2017     Paroxysmal atrial fibrillation (H) 5/11/2017     Paroxysmal VT (H) 5/11/2017     Vitamin B12 deficiency (non anemic) 5/11/2017       FAMILY HISTORY:  Family History   Problem Relation Age of Onset     CEREBROVASCULAR DISEASE  Mother      DIABETES Mother      Hypertension Mother      Coronary Artery Disease Father      Type 2 Diabetes Father        SOCIAL HISTORY:  Social History     Social History     Marital status:      Spouse name: N/A     Number of children: N/A     Years of education: N/A     Social History Main Topics     Smoking status: Former Smoker     Quit date: 1995     Smokeless tobacco: None     Alcohol use No     Drug use: None     Sexual activity: Yes     Partners: Female     Other Topics Concern     Parent/Sibling W/ Cabg, Mi Or Angioplasty Before 65f 55m? No     Social History Narrative       CURRENT MEDICATIONS:  Current Outpatient Prescriptions   Medication Sig Dispense Refill     spironolactone (ALDACTONE) 25 MG tablet Take 0.5 tablets (12.5 mg) by mouth daily 45 tablet 3     metoprolol (TOPROL-XL) 25 MG 24 hr tablet Take 1 tablet (25 mg) by mouth 2 times daily 180 tablet 3     aspirin 81 MG chewable tablet 1 tablet (81 mg) by Oral or Feeding Tube route daily 36 tablet      metFORMIN (GLUCOPHAGE) 500 MG tablet Take 0.5 tablets (250 mg) by mouth 2 times daily (with meals) 60 tablet 0     pantoprazole (PROTONIX) 40 MG EC tablet Take 1 tablet (40 mg) by mouth every morning 30 tablet 0     senna-docusate (SENOKOT-S;PERICOLACE) 8.6-50 MG per tablet Take 2 tablets by mouth 2 times daily as needed for constipation 100 tablet 0     traZODone (DESYREL) 50 MG tablet Take 50 mg by mouth nightly x one week then reduce to 25 mg (1/2 tab) by mouth nightly x one week then stop. 11 tablet 0     warfarin (COUMADIN) 3 MG tablet Use as directed. 30 tablet 0     HYDROmorphone (DILAUDID) 2 MG tablet Take 1 tablet (2 mg) by mouth every 4 hours as needed for moderate to severe pain 45 tablet 0     traMADol (ULTRAM) 50 MG tablet Take 2 tablets (100 mg) by mouth every 6 hours as needed for moderate pain or breakthrough pain 28 tablet      acetaminophen (TYLENOL) 325 MG tablet Take 2 tablets (650 mg) by mouth every 6 hours 100 tablet   "    bumetanide (BUMEX) 2 MG tablet Take 1 tablet (2 mg) by mouth daily       polyethylene glycol (MIRALAX/GLYCOLAX) Packet Take 17 g by mouth daily as needed for constipation (for no bm in 1 day) 7 packet      albuterol (ALBUTEROL) 108 (90 BASE) MCG/ACT Inhaler Inhale 2 puffs into the lungs every 4 hours as needed for shortness of breath / dyspnea or wheezing       ipratropium - albuterol 0.5 mg/2.5 mg/3 mL (DUONEB) 0.5-2.5 (3) MG/3ML neb solution Take 3 mLs by nebulization every 4 hours as needed for shortness of breath / dyspnea or wheezing       citalopram (CELEXA) 20 MG tablet Take 20 mg by mouth daily       cyanocobalamin (VITAMIN B12) 1000 MCG/ML injection Inject 1,000 mcg into the muscle every 30 days       fluticasone-vilanterol (BREO ELLIPTA) 200-25 MCG/INH oral inhaler Inhale 1 puff into the lungs daily       montelukast (SINGULAIR) 10 MG tablet Take 10 mg by mouth At Bedtime       umeclidinium (INCRUSE ELLIPTA) 62.5 MCG/INH oral inhaler Inhale 1 puff into the lungs daily       zinc sulfate (ZINCATE) 220 (50 ZN) MG capsule Take 220 mg by mouth 2 times daily         ROS:   Constitutional: Overall weakness and fatigue.  Denies fever, chills, or sweats. Weight stable.       ENT: Denies visual disturbance, ear ache, epistaxis, sore throat.   Allergies/Immunologic: Negative.   Respiratory:  See HPI.   Denies cough, hemoptysis, wheezing.   Cardiovascular: As per HPI.   GI: Denies nausea, vomiting, diarrhea, hematemesis, melena, or hematochezia.   : Denies urinary frequency, dysuria, or hematuria.   Integument: Negative.   Psychiatric: Negative.   Neuro: Negative.   Endocrinology: Negative.   Musculoskeletal: Negative.    EXAM:  BP (!) 72/0 (BP Location: Right arm, Patient Position: Chair, Cuff Size: Adult Large)  Pulse 96  Temp 98.3  F (36.8  C)  Ht 1.727 m (5' 8\")  Wt 110.6 kg (243 lb 12.8 oz)  SpO2 96%  BMI 37.07 kg/m2  General: appears comfortable, alert and articulate  Head: normocephalic, " atraumatic  Eyes: anicteric sclera, EOMI  Neck: no adenopathy, carotid bruits.  Neck supple.  Orophyarynx: moist mucosa, no lesions, dentition intact  Heart: LVAD hum heard.  S1/S2, no appreciable JVD  Lungs: clear, no rales or wheezing  Abdomen: soft, non-tender, bowel sounds present, no hepatomegaly  Extremities: no clubbing, cyanosis or edema  Neurological: normal speech and affect, no gross motor deficits    Labs:  CBC RESULTS:  Lab Results   Component Value Date    WBC 6.9 07/12/2017    RBC 3.47 (L) 07/12/2017    HGB 9.5 (L) 07/12/2017    HCT 31.8 (L) 07/12/2017    MCV 92 07/12/2017    MCH 27.4 07/12/2017    MCHC 29.9 (L) 07/12/2017    RDW 14.5 07/12/2017     07/12/2017       CMP RESULTS:  Lab Results   Component Value Date     07/12/2017    POTASSIUM 4.5 07/12/2017    CHLORIDE 102 07/12/2017    CO2 30 07/12/2017    ANIONGAP 6 07/12/2017    GLC 97 07/12/2017    BUN 17 07/12/2017    CR 1.38 (H) 07/12/2017    GFRESTIMATED 53 (L) 07/12/2017    GFRESTBLACK 64 07/12/2017    QAMAR 9.0 07/12/2017    BILITOTAL 0.6 07/12/2017    ALBUMIN 3.4 07/12/2017    ALKPHOS 84 07/12/2017    ALT 18 07/12/2017    AST 24 07/12/2017        INR RESULTS:  Lab Results   Component Value Date    INR 2.10 (H) 07/12/2017       No components found for: CK  Lab Results   Component Value Date    MAG 2.1 06/30/2017     Lab Results   Component Value Date    NTBNP 2502 (H) 05/12/2017       Most recent echocardiogram: 7/25/17  LVAD Echocardiogram with two-dimensional, color and spectral Doppler  performed. Technically difficult study. Poor acoustic windows.  _____________________________________________________________________________  __     Interpretation Summary  HM 2 at 9000 RPM.     LVAD cannula was seen in the expected anatomic position in the LV apex. LV end diastolic diameter is 5.4cm. The Ejection Fraction is estimated at <20%.  The aortic valve opens intermittently.  Septum normal.  The inferior vena cava was normal in size with  preserved respiratory variability.     There has been an interval placement of LVAD since the previous study (6/12/17). The mitral insufficiency has decreased and the LV diameter has  decreased.     _____________________________________________________________________________  __     Left Ventricle  LV end diastolic diameter is 5.4cm. The Ejection Fraction is estimated at <20%. LVAD cannula was seen in the expected anatomic position in the LV apex.     Right Ventricle  Right ventricular function cannot be assessed due to poor image quality.  Global right ventricular function is mildly reduced. A pacemaker lead is noted  in the right ventricle.     Atria  Severe left atrial enlargement is present.     Mitral Valve  The mitral valve is normal. Trace mitral insufficiency is present.        Aortic Valve  The aortic valve opens intermittently.     Tricuspid Valve  The tricuspid valve cannot be assessed.     Pulmonic Valve  The pulmonic valve cannot be assessed.     Vessels  The inferior vena cava was normal in size with preserved respiratory variability.     Pericardium  No pericardial effusion is present.        Compared to Previous Study  There has been an interval placement of LVAD since the previous study.  (6/12/17). The mitral insufficiency has decreased and the LV diamater has decreased.  ____________________________________________________________________________  __     MMode/2D Measurements & Calculations  IVSd: 0.94 cm  LVIDd: 5.4 cm  LVIDs: 5.3 cm  LVPWd: 0.98 cm  FS: 3.1 %  EDV(Teich): 142.5 ml  ESV(Teich): 132.4 ml  LV mass(C)d: 198.1 grams  LV mass(C)dI: 88.0 grams/m2  Doppler Measurements & Calculations  PA acc time: 0.08 sec       Assessment and Plan:    Jim Willingham is a 62 year old male with a past medical history of NICM (EF 20%) s/p HM II LVAD placement (6/19/17) complicated by arrhythmias, WALTER, and small left pleural effusion, CKD Stage III, DM Type II, CECILIA, obesity, HTN, COPD, and asthma who is  here for heart failure and LVAD follow up.  He appears to be euvolemic today.   I am adding Aldactone today and will discontinue his potassium.  Plan to recheck BMP in one week.  F/u at the end of July with Dr. Montgomery as previously scheduled.        1.  Chronic systolic heart failure secondary to NICM.  Stage D  NYHA Class III  ACEi/ARB: Deferred as he had worsening renal function on ARB and doesn't tolerate lisinopril secondary to cough.  BB: yes.  Change to Toprol XL 25 mg twice daily.  Aldosterone antagonist: yes Start Aldactone 12.5 mg daily. Stop potassium.  SCD prophylaxis: CRT-D  Fluid status: Euvolemic  Anticoagulation: Warfarin INR goal 2-3.  INR 2.10 today.  Antiplatelet:  ASA dose 81 mg daily.     2.  LVAD S/P HM II LVAD 6/19/17:  MAPS 72.  Well controlled on current regimen.  Anticoagulation: Warfarin INR goal 2-3.  INR 2.10 today.  Antiplatelet:  ASA dose 81 mg daily.  LDH:  300 today.  (343 on 6/29)  LVAD interrogation:  Flow 5.5 l/min, Speed 9200 RPM's, PI 5.9, Power 5.6 car[place].  Occasional PI events noted with no speed drops or power surges concerning for thrombus or pump malfunction.     3.  CKD Stage III:  Stable.  Will continue to monitor creatinine closely with addition of aldactone.  Patient has tolerated aldactone in the past.  Recheck BMP in 1-2 weeks.     4.  HTN:  Controlled on Lopressor.  Switched lopressor to Toprol XL 25 mg twice daily due to MERIT-HF trial showing benefit with extended release in heart failure over the short acting form.     5.  Paroxysmal Atrial Fibrillation:  Warfarin and Toprol XL.  INR therapeutic today. Deferred further up titration today secondary to fatigue and weakness.  Consider increasing as tolerated for HR control.   Device check today showed Normal ICD function.  15 NSVT episodes recorded - 1-4 sec, 171-228 bpm. 1 episode recorded as VT which appears to be a SVT - 5 min 1 sec, 155 bpm.  2 SVT-ST episodes recorded - 4 min 25 sec - 34 min 34 sec, 154-158 bpm.   3 AT/AF episodes recorded - 2 seconds in duration.  No new arrhythmias recorded since 7/6/17.  VF initial beats programmed from 18/24 to 30/40 today.  Monitor initial beats programmed from 28 to 32 today.  Intrinsic rhythm = NSR @ 94 bpm.  AP = 0.2%.   = 94.8%.  BVP = 92.5%.  OptiVol fluid index is above baseline.  Estimated battery longevity to MARVA = 5.3 years.       6.  Follow-up:  Follow up BMP in one week.  Follow up with Dr. Montgomery at the end of the month as planned.        Cate DUMONT, CNP  [phone]

## 2017-07-12 NOTE — PROGRESS NOTES
"Patient seen in clinic for evaluation and iterative programming of his Medtronic multi lead ICD per MD orders.  Patient has an appointment to see Cate Marshall NP today.  Patient is s/p LVAD implant 6/19/17.  Patient has requested to transfer his device care to the [facility] from [facility].  Normal ICD function.  15 NSVT episodes recorded - 1-4 sec, 171-228 bpm.  1 episode recorded as VT which appears to be a SVT - 5 min 1 sec, 155 bpm.  2 SVT-ST episodes recorded - 4 min 25 sec - 34 min 34 sec, 154-158 bpm.  3 AT/AF episodes recorded - 2 seconds in duration.  No new arrhythmias recorded since 7/6/17.  VF initial beats programmed from 18/24 to 30/40 today.  Monitor initial beats programmed from 28 to 32 today.  Intrinsic rhythm = NSR @ 94 bpm.  AP = 0.2%.   = 94.8%.  BVP = 92.5%.  OptiVol fluid index is above baseline.  Estimated battery longevity to MARVA = 5.3 years.  Patient reports that he is feeling \"okay\" today.  Plan for patient to send a remote transmission in 3 months and return to clinic in 6 months.    Multi lead ICD    "

## 2017-07-12 NOTE — PATIENT INSTRUCTIONS
It was a pleasure to see you in clinic today.  Please do not hesitate to call with any questions or concerns.  We look forward to seeing you in clinic at your next device check in 3 months.    Charleen Owen, RN, MS, CCRN  Electrophysiology Nurse Clinician  [facility]    During Business Hours Please Call:  [phone]  After Hours Please Call:  [phone] - select option #[phone] and ask for job code [id number]

## 2017-07-12 NOTE — MR AVS SNAPSHOT
After Visit Summary   7/12/2017    Jim Willingham    MRN: [id number]           Patient Information     Date Of Birth          1955        Visit Information        Provider Department      7/12/2017 8:00 AM Cate Marshall NP [facility]        Today's Diagnoses     Chronic systolic congestive heart failure (H)    -  1    LVAD (left ventricular assist device) present (H)        Paroxysmal atrial fibrillation (H)          Care Instructions    1. Start taking aldactone 12.5mg daily.  2. Stop taking potassium.  3. Start taking metoprolol XL 25mg twice daily and stop your current metoprolol.  4. Have your labs checked in 1 week.  5. Follow-up as scheduled on July 25th.            Follow-ups after your visit        Your next 10 appointments already scheduled     Jul 12, 2017  9:00 AM CDT   (Arrive by 8:45 AM)   Implanted Defibulator with Uc Cv Device 1   [facility])    [address] [place] [phone]   [phone]            Jul 25, 2017  8:30 AM CDT   Lab with UC LAB   [facility] ([facility])    [address] [place] [id number]   [phone]            Jul 25, 2017  9:00 AM CDT   (Arrive by 8:45 AM)   Ventricular Assist Device with Delisa Montgomery MD   [facility])    [address] [place] [phone]   [phone]            Aug 11, 2017 11:00 AM CDT   Lab with UC LAB   [facility])    [address] [place] [phone]   [phone]            Aug 11, 2017 11:30 AM CDT   (Arrive by 11:15 AM)   Ventricular Assist Device with Delisa Montgomery MD   [facility])    [address] [place] [phone]   [phone]            Aug 21, 2017  2:00 PM CDT   Lab  with UC LAB   [facility] ([facility])    [address] [place] [phone]   [phone]            Aug 21, 2017  2:30 PM CDT   (Arrive by 2:15 PM)   Return Visit with Ronnie Quigley MD   [facility] ([facility])    [address] [place] [phone]   [phone]            Sep 15, 2017  9:00 AM CDT   Lab with  LAB   [facility] ([facility])    [address] [place] [phone]   [phone]            Sep 15, 2017  9:30 AM CDT   (Arrive by 9:15 AM)   Implanted Defibulator with  Cv Device 1   [facility] ([facility])    [address] [place] [phone]   [phone]            Sep 15, 2017 10:00 AM CDT   (Arrive by 9:45 AM)   Ventricular Assist Device with Jeanine Wilson NP   [facility] ([facility])    [address] [place] [phone]   [phone]              Who to contact     If you have questions or need follow up information about today's clinic visit or your schedule please contact [facility] directly at [phone].  Normal or non-critical lab and imaging results will be communicated to you by N[employer]ham[employer], letter or phone within 4 business days after the clinic has received the results. If you do not hear from us within 7 days, please contact the clinic through Nafhamhart or phone. If you have a critical or abnormal lab result, we will notify you by phone as soon as possible.  Submit refill requests through [employer] or call your pharmacy and they will forward the refill request to us. Please allow 3 business days for your refill to be completed.          Additional Information About Your Visit        [employer] Information     [employer] lets you send messages to your doctor, view your test results, renew your prescriptions,  "schedule appointments and more. To sign up, go to [email or web address] . Click on \"Log in\" on the left side of the screen, which will take you to the Welcome page. Then click on \"Sign up Now\" on the right side of the page.     You will be asked to enter the access code listed below, as well as some personal information. Please follow the directions to create your username and password.     Your access code is: [email or web address]  Expires: 2017  6:30 AM     Your access code will  in 90 days. If you need help or a new code, please call your [place] clinic or [phone].        Care EveryWhere ID     This is your Care EveryWhere ID. This could be used by other organizations to access your [place] medical records  [id number]        Your Vitals Were     Pulse Temperature Height Pulse Oximetry BMI (Body Mass Index)       96 98.3  F (36.8  C) 1.727 m (5' 8\") 96% 37.07 kg/m2        Blood Pressure from Last 3 Encounters:   17 (!) 72/0   17 (!) 85/68   17 (!) 88/74    Weight from Last 3 Encounters:   17 110.6 kg (243 lb 12.8 oz)   17 110.8 kg (244 lb 4.8 oz)   17 113.5 kg (250 lb 3.2 oz)              We Performed the Following     (29928) INTERROGATE VENT ASSIST DEVICE, IN PERSON, DAGO JETER ANALYSIS PARAMETERS          Today's Medication Changes          These changes are accurate as of: 17  8:55 AM.  If you have any questions, ask your nurse or doctor.               Start taking these medicines.        Dose/Directions    metoprolol 25 MG 24 hr tablet   Commonly known as:  TOPROL-XL   Used for:  Chronic systolic congestive heart failure (H), LVAD (left ventricular assist device) present (H)   Started by:  Cate Marshall NP        Dose:  25 mg   Take 1 tablet (25 mg) by mouth 2 times daily   Quantity:  180 tablet   Refills:  3       spironolactone 25 MG tablet   Commonly known as:  ALDACTONE   Used for:  Chronic systolic congestive heart failure (H), LVAD (left ventricular " assist device) present (H)   Started by:  Cate Marshall NP        Dose:  12.5 mg   Take 0.5 tablets (12.5 mg) by mouth daily   Quantity:  45 tablet   Refills:  3         Stop taking these medicines if you haven't already. Please contact your care team if you have questions.     metoprolol 25 MG tablet   Commonly known as:  LOPRESSOR   Stopped by:  Cate Marshall NP           potassium chloride SA 20 MEQ CR tablet   Commonly known as:  K-DUR/KLOR-CON M   Stopped by:  Cate Marshall NP                Where to get your medicines      These medications were sent to [facility] [address] [place] [address] [address] AT [facility] [address] [place] [address]     Phone:  [phone]     metoprolol 25 MG 24 hr tablet    spironolactone 25 MG tablet                Primary Care Provider Office Phone # Fax #    Jovany Salas [phone]       [address] [address]        Equal Access to Services     [facility]: Hadii aad ku hadasho Soomaali, waaxda luqadaha, qaybta kaalmada adeegyada, kendra idiin hayaan ademichael marquez . So [facility] [phone].    ATENCIÓN: Si habla español, tiene a roger disposición servicios gratuitos de asistencia lingüística. [facility] [phone].    We comply with applicable federal civil rights laws and [place] laws. We do not discriminate on the basis of race, color, national origin, age, disability sex, sexual orientation or gender identity.            Thank you!     Thank you for choosing [facility]  for your care. Our goal is always to provide you with excellent care. Hearing back from our patients is one way we can continue to improve our services. Please take a few minutes to complete the written survey that you may receive in the mail after your visit with us. Thank you!             Your Updated Medication List - Protect others around you: Learn how to safely  use, store and throw away your medicines at www.disposemymeds.org.          This list is accurate as of: 7/12/17  8:55 AM.  Always use your most recent med list.                   Brand Name Dispense Instructions for use Diagnosis    acetaminophen 325 MG tablet    TYLENOL    100 tablet    Take 2 tablets (650 mg) by mouth every 6 hours    Acute post-operative pain       albuterol 108 (90 BASE) MCG/ACT Inhaler   Generic drug:  albuterol      Inhale 2 puffs into the lungs every 4 hours as needed for shortness of breath / dyspnea or wheezing        aspirin 81 MG chewable tablet     36 tablet    1 tablet (81 mg) by Oral or Feeding Tube route daily    LVAD (left ventricular assist device) present (H)       BREO ELLIPTA 200-25 MCG/INH oral inhaler   Generic drug:  fluticasone-vilanterol      Inhale 1 puff into the lungs daily        bumetanide 2 MG tablet    BUMEX     Take 1 tablet (2 mg) by mouth daily    LVAD (left ventricular assist device) present (H)       celeXA 20 MG tablet   Generic drug:  citalopram      Take 20 mg by mouth daily        cyanocobalamin 1000 MCG/ML injection    VITAMIN B12     Inject 1,000 mcg into the muscle every 30 days        HYDROmorphone 2 MG tablet    DILAUDID    45 tablet    Take 1 tablet (2 mg) by mouth every 4 hours as needed for moderate to severe pain    Acute post-operative pain       INCRUSE ELLIPTA 62.5 MCG/INH oral inhaler   Generic drug:  umeclidinium      Inhale 1 puff into the lungs daily        ipratropium - albuterol 0.5 mg/2.5 mg/3 mL 0.5-2.5 (3) MG/3ML neb solution    DUONEB     Take 3 mLs by nebulization every 4 hours as needed for shortness of breath / dyspnea or wheezing        metFORMIN 500 MG tablet    GLUCOPHAGE    60 tablet    Take 0.5 tablets (250 mg) by mouth 2 times daily (with meals)    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, without long-term current use of insulin, unspecified CKD stage (H)       metoprolol 25 MG 24 hr tablet     TOPROL-XL    180 tablet    Take 1 tablet (25 mg) by mouth 2 times daily    Chronic systolic congestive heart failure (H), LVAD (left ventricular assist device) present (H)       pantoprazole 40 MG EC tablet    PROTONIX    30 tablet    Take 1 tablet (40 mg) by mouth every morning    Acute post-operative pain       polyethylene glycol Packet    MIRALAX/GLYCOLAX    7 packet    Take 17 g by mouth daily as needed for constipation (for no bm in 1 day)    Acute post-operative pain       senna-docusate 8.6-50 MG per tablet    SENOKOT-S;PERICOLACE    100 tablet    Take 2 tablets by mouth 2 times daily as needed for constipation    Acute post-operative pain       SINGULAIR 10 MG tablet   Generic drug:  montelukast      Take 10 mg by mouth At Bedtime        spironolactone 25 MG tablet    ALDACTONE    45 tablet    Take 0.5 tablets (12.5 mg) by mouth daily    Chronic systolic congestive heart failure (H), LVAD (left ventricular assist device) present (H)       traMADol 50 MG tablet    ULTRAM    28 tablet    Take 2 tablets (100 mg) by mouth every 6 hours as needed for moderate pain or breakthrough pain    Acute post-operative pain       traZODone 50 MG tablet    DESYREL    11 tablet    Take 50 mg by mouth nightly x one week then reduce to 25 mg (1/2 tab) by mouth nightly x one week then stop.    Acute post-operative pain, LVAD (left ventricular assist device) present (H)       warfarin 3 MG tablet    COUMADIN    30 tablet    Use as directed.    LVAD (left ventricular assist device) present (H)       zinc sulfate 220 (50 ZN) MG capsule    ZINCATE     Take 220 mg by mouth 2 times daily

## 2017-07-12 NOTE — PROGRESS NOTES
HPI: Jim Willingham is a 62 year old male with a past medical history of NICM (EF 20%) s/p HM II LVAD placement (6/19/17) complicated by arrhythmias, WALTER, and small left pleural effusion, CKD Stage III, DM Type II, CECILIA, obesity, HTN, COPD, and asthma who is here for heart failure and LVAD follow up.      Jim states that overall he is feeling well.  He still feels fatigued overall and continues to have RESENDIZ after walking a couple of blocks.  His strength and stamina are improving.  He is no longer having positional lightheadedness since increasing his fluid and food intake.  Denies SOB, CP, PND, orthopnea, edema, weight gain, or dizziness.       No driveline concerns.  Driveline site is not infected.  No drainage.  No hematuria, hematochezia, epistaxis, melena, HA, or neurological changes.  No VAD alarms.     PAST MEDICAL HISTORY:  Past Medical History:   Diagnosis Date     Benign essential hypertension 5/11/2017     Cardiomyopathy, unspecified (H) 5/8/2017     CKD (chronic kidney disease) stage 3, GFR 30-59 ml/min 5/11/2017     Depression 5/11/2017     Diabetes mellitus (H) 5/11/2017     H/O gastric bypass 5/11/2017     ICD (implantable cardioverter-defibrillator), biventricular, in situ 5/11/2017     NICM (nonischemic cardiomyopathy) (H)/ EF 20% 5/11/2017    ECHO: LVEDd. 7.66 cm, Restrictive pattern , Severe mitral valve regurgitation     CECILIA (obstructive sleep apnea) 5/11/2017     Paroxysmal atrial fibrillation (H) 5/11/2017     Paroxysmal VT (H) 5/11/2017     Vitamin B12 deficiency (non anemic) 5/11/2017       FAMILY HISTORY:  Family History   Problem Relation Age of Onset     CEREBROVASCULAR DISEASE Mother      DIABETES Mother      Hypertension Mother      Coronary Artery Disease Father      Type 2 Diabetes Father        SOCIAL HISTORY:  Social History     Social History     Marital status:      Spouse name: N/A     Number of children: N/A     Years of education: N/A     Social History Main Topics      Smoking status: Former Smoker     Quit date: 1995     Smokeless tobacco: None     Alcohol use No     Drug use: None     Sexual activity: Yes     Partners: Female     Other Topics Concern     Parent/Sibling W/ Cabg, Mi Or Angioplasty Before 65f 55m? No     Social History Narrative       CURRENT MEDICATIONS:  Current Outpatient Prescriptions   Medication Sig Dispense Refill     spironolactone (ALDACTONE) 25 MG tablet Take 0.5 tablets (12.5 mg) by mouth daily 45 tablet 3     metoprolol (TOPROL-XL) 25 MG 24 hr tablet Take 1 tablet (25 mg) by mouth 2 times daily 180 tablet 3     aspirin 81 MG chewable tablet 1 tablet (81 mg) by Oral or Feeding Tube route daily 36 tablet      metFORMIN (GLUCOPHAGE) 500 MG tablet Take 0.5 tablets (250 mg) by mouth 2 times daily (with meals) 60 tablet 0     pantoprazole (PROTONIX) 40 MG EC tablet Take 1 tablet (40 mg) by mouth every morning 30 tablet 0     senna-docusate (SENOKOT-S;PERICOLACE) 8.6-50 MG per tablet Take 2 tablets by mouth 2 times daily as needed for constipation 100 tablet 0     traZODone (DESYREL) 50 MG tablet Take 50 mg by mouth nightly x one week then reduce to 25 mg (1/2 tab) by mouth nightly x one week then stop. 11 tablet 0     warfarin (COUMADIN) 3 MG tablet Use as directed. 30 tablet 0     HYDROmorphone (DILAUDID) 2 MG tablet Take 1 tablet (2 mg) by mouth every 4 hours as needed for moderate to severe pain 45 tablet 0     traMADol (ULTRAM) 50 MG tablet Take 2 tablets (100 mg) by mouth every 6 hours as needed for moderate pain or breakthrough pain 28 tablet      acetaminophen (TYLENOL) 325 MG tablet Take 2 tablets (650 mg) by mouth every 6 hours 100 tablet      bumetanide (BUMEX) 2 MG tablet Take 1 tablet (2 mg) by mouth daily       polyethylene glycol (MIRALAX/GLYCOLAX) Packet Take 17 g by mouth daily as needed for constipation (for no bm in 1 day) 7 packet      albuterol (ALBUTEROL) 108 (90 BASE) MCG/ACT Inhaler Inhale 2 puffs into the lungs every 4 hours as needed  "for shortness of breath / dyspnea or wheezing       ipratropium - albuterol 0.5 mg/2.5 mg/3 mL (DUONEB) 0.5-2.5 (3) MG/3ML neb solution Take 3 mLs by nebulization every 4 hours as needed for shortness of breath / dyspnea or wheezing       citalopram (CELEXA) 20 MG tablet Take 20 mg by mouth daily       cyanocobalamin (VITAMIN B12) 1000 MCG/ML injection Inject 1,000 mcg into the muscle every 30 days       fluticasone-vilanterol (BREO ELLIPTA) 200-25 MCG/INH oral inhaler Inhale 1 puff into the lungs daily       montelukast (SINGULAIR) 10 MG tablet Take 10 mg by mouth At Bedtime       umeclidinium (INCRUSE ELLIPTA) 62.5 MCG/INH oral inhaler Inhale 1 puff into the lungs daily       zinc sulfate (ZINCATE) 220 (50 ZN) MG capsule Take 220 mg by mouth 2 times daily         ROS:   Constitutional: Overall weakness and fatigue.  Denies fever, chills, or sweats. Weight stable.       ENT: Denies visual disturbance, ear ache, epistaxis, sore throat.   Allergies/Immunologic: Negative.   Respiratory:  See HPI.   Denies cough, hemoptysis, wheezing.   Cardiovascular: As per HPI.   GI: Denies nausea, vomiting, diarrhea, hematemesis, melena, or hematochezia.   : Denies urinary frequency, dysuria, or hematuria.   Integument: Negative.   Psychiatric: Negative.   Neuro: Negative.   Endocrinology: Negative.   Musculoskeletal: Negative.    EXAM:  BP (!) 72/0 (BP Location: Right arm, Patient Position: Chair, Cuff Size: Adult Large)  Pulse 96  Temp 98.3  F (36.8  C)  Ht 1.727 m (5' 8\")  Wt 110.6 kg (243 lb 12.8 oz)  SpO2 96%  BMI 37.07 kg/m2  General: appears comfortable, alert and articulate  Head: normocephalic, atraumatic  Eyes: anicteric sclera, EOMI  Neck: no adenopathy, carotid bruits.  Neck supple.  Orophyarynx: moist mucosa, no lesions, dentition intact  Heart: LVAD hum heard.  S1/S2, no appreciable JVD  Lungs: clear, no rales or wheezing  Abdomen: soft, non-tender, bowel sounds present, no hepatomegaly  Extremities: no " clubbing, cyanosis or edema  Neurological: normal speech and affect, no gross motor deficits    Labs:  CBC RESULTS:  Lab Results   Component Value Date    WBC 6.9 07/12/2017    RBC 3.47 (L) 07/12/2017    HGB 9.5 (L) 07/12/2017    HCT 31.8 (L) 07/12/2017    MCV 92 07/12/2017    MCH 27.4 07/12/2017    MCHC 29.9 (L) 07/12/2017    RDW 14.5 07/12/2017     07/12/2017       CMP RESULTS:  Lab Results   Component Value Date     07/12/2017    POTASSIUM 4.5 07/12/2017    CHLORIDE 102 07/12/2017    CO2 30 07/12/2017    ANIONGAP 6 07/12/2017    GLC 97 07/12/2017    BUN 17 07/12/2017    CR 1.38 (H) 07/12/2017    GFRESTIMATED 53 (L) 07/12/2017    GFRESTBLACK 64 07/12/2017    QAMAR 9.0 07/12/2017    BILITOTAL 0.6 07/12/2017    ALBUMIN 3.4 07/12/2017    ALKPHOS 84 07/12/2017    ALT 18 07/12/2017    AST 24 07/12/2017        INR RESULTS:  Lab Results   Component Value Date    INR 2.10 (H) 07/12/2017       No components found for: CK  Lab Results   Component Value Date    MAG 2.1 06/30/2017     Lab Results   Component Value Date    NTBNP 2502 (H) 05/12/2017       Most recent echocardiogram: 7/25/17  LVAD Echocardiogram with two-dimensional, color and spectral Doppler  performed. Technically difficult study. Poor acoustic windows.  _____________________________________________________________________________  __     Interpretation Summary  HM 2 at 9000 RPM.     LVAD cannula was seen in the expected anatomic position in the LV apex. LV end diastolic diameter is 5.4cm. The Ejection Fraction is estimated at <20%.  The aortic valve opens intermittently.  Septum normal.  The inferior vena cava was normal in size with preserved respiratory variability.     There has been an interval placement of LVAD since the previous study (6/12/17). The mitral insufficiency has decreased and the LV diameter has  decreased.     _____________________________________________________________________________  __     Left Ventricle  LV end diastolic  diameter is 5.4cm. The Ejection Fraction is estimated at <20%. LVAD cannula was seen in the expected anatomic position in the LV apex.     Right Ventricle  Right ventricular function cannot be assessed due to poor image quality.  Global right ventricular function is mildly reduced. A pacemaker lead is noted  in the right ventricle.     Atria  Severe left atrial enlargement is present.     Mitral Valve  The mitral valve is normal. Trace mitral insufficiency is present.        Aortic Valve  The aortic valve opens intermittently.     Tricuspid Valve  The tricuspid valve cannot be assessed.     Pulmonic Valve  The pulmonic valve cannot be assessed.     Vessels  The inferior vena cava was normal in size with preserved respiratory variability.     Pericardium  No pericardial effusion is present.        Compared to Previous Study  There has been an interval placement of LVAD since the previous study.  (6/12/17). The mitral insufficiency has decreased and the LV diamater has decreased.  ____________________________________________________________________________  __     MMode/2D Measurements & Calculations  IVSd: 0.94 cm  LVIDd: 5.4 cm  LVIDs: 5.3 cm  LVPWd: 0.98 cm  FS: 3.1 %  EDV(Teich): 142.5 ml  ESV(Teich): 132.4 ml  LV mass(C)d: 198.1 grams  LV mass(C)dI: 88.0 grams/m2  Doppler Measurements & Calculations  PA acc time: 0.08 sec       Assessment and Plan:    Jim Willingham is a 62 year old male with a past medical history of NICM (EF 20%) s/p HM II LVAD placement (6/19/17) complicated by arrhythmias, WALTER, and small left pleural effusion, CKD Stage III, DM Type II, CECILIA, obesity, HTN, COPD, and asthma who is here for heart failure and LVAD follow up.  He appears to be euvolemic today.   I am adding Aldactone today and will discontinue his potassium.  Plan to recheck BMP in one week.  F/u at the end of July with Dr. Mongtomery as previously scheduled.        1.  Chronic systolic heart failure secondary to NICM.  Stage D  NYHA  Class III  ACEi/ARB: Deferred as he had worsening renal function on ARB and doesn't tolerate lisinopril secondary to cough.  BB: yes.  Change to Toprol XL 25 mg twice daily.  Aldosterone antagonist: yes Start Aldactone 12.5 mg daily. Stop potassium.  SCD prophylaxis: CRT-D  Fluid status: Euvolemic  Anticoagulation: Warfarin INR goal 2-3.  INR 2.10 today.  Antiplatelet:  ASA dose 81 mg daily.     2.  LVAD S/P HM II LVAD 6/19/17:  MAPS 72.  Well controlled on current regimen.  Anticoagulation: Warfarin INR goal 2-3.  INR 2.10 today.  Antiplatelet:  ASA dose 81 mg daily.  LDH:  300 today.  (343 on 6/29)  LVAD interrogation:  Flow 5.5 l/min, Speed 9200 RPM's, PI 5.9, Power 5.6 carlisle.  Occasional PI events noted with no speed drops or power surges concerning for thrombus or pump malfunction.     3.  CKD Stage III:  Stable.  Will continue to monitor creatinine closely with addition of aldactone.  Patient has tolerated aldactone in the past.  Recheck BMP in 1-2 weeks.     4.  HTN:  Controlled on Lopressor.  Switched lopressor to Toprol XL 25 mg twice daily due to MERIT-HF trial showing benefit with extended release in heart failure over the short acting form.     5.  Paroxysmal Atrial Fibrillation:  Warfarin and Toprol XL.  INR therapeutic today. Deferred further up titration today secondary to fatigue and weakness.  Consider increasing as tolerated for HR control.   Device check today showed Normal ICD function.  15 NSVT episodes recorded - 1-4 sec, 171-228 bpm. 1 episode recorded as VT which appears to be a SVT - 5 min 1 sec, 155 bpm.  2 SVT-ST episodes recorded - 4 min 25 sec - 34 min 34 sec, 154-158 bpm.  3 AT/AF episodes recorded - 2 seconds in duration.  No new arrhythmias recorded since 7/6/17.  VF initial beats programmed from 18/24 to 30/40 today.  Monitor initial beats programmed from 28 to 32 today.  Intrinsic rhythm = NSR @ 94 bpm.  AP = 0.2%.   = 94.8%.  BVP = 92.5%.  OptiVol fluid index is above baseline.   Estimated battery longevity to MARVA = 5.3 years.      6.  Follow-up:  Follow up BMP in one week.  Follow up with Dr. Montgomery at the end of the month as planned.        Cate DUMONT, CNP  [phone]

## 2017-07-12 NOTE — NURSING NOTE
1). PUMP DATA  Primary controller serial number: [id number]    HM II:   Flow: 5.5 L/min,    Speed: 9200 RPMs,     PI: 5.9 ,  Power: 5.6 Manuel,      Primary controller   Back up battery: Patient use: 8, Replace in: 29  Months     Data downloaded: No   Equipment and driveline assessed for damage: Yes     Back up : Serial number: [id number]  Back up battery: Patient use: 4 Replace in: 31  Months  Programmed settings identical to the settings on the primary controller :Yes      Education complete: Yes   Charge the BACKUP controller s backup battery every 6 months  Perform a self test on BACKUP every 6 months  Change the MPU s batteries every 6 months:Yes  Have equipment serviced yearly (if applicable):Yes    2). ALARMS  Alarms reported by patient since last pump evaluation: No  Alarms or other finding noted during pump data history and alarm download: Yes    Action Taken: occasional PI events  Reviewed data with patient: Yes      3). DRESSING CHANGE / DRIVELINE ASSESSMENT  Dressing change completed today: No  Appearance of Driveline site: c/d/i, no redness per pt    Driveline stabilization: Method: [employer]  [ Teaching reinforced on need for stabilization of Driveline. ]

## 2017-07-12 NOTE — NURSING NOTE
Chief Complaint   Patient presents with     Follow Up For     VAD Follow up     Vitals were taken and medications were reconciled.     Charleen Castellano,GOKUL  7:21 AM

## 2017-07-12 NOTE — PROGRESS NOTES
IRF-HECTOR CLARIFICATION NOTE FOR DISCHARGE  Lowest score for each FIM item supported by available charting  Discharge FIM scores taken from charting on 7/6/17.    Bathing: FIM 7. Patient washed 10/10 body parts independently.  Lower Body Dressing: FIM 6. Patient dressed lower body with modified independence due to needing extra time to put compression stockings on.  Bladder:  FIM 6. Patient voided in urinal but emptied it independently.  Bladder Number of Accidents: 0  Bowel: FIM 6. Patient is continent of bowel and takes bowel medication.  Bowel Number of Accidents: 0  Tub/shower transfer: FIM 1. Activity did not occur due to contraindication with LVAD.  Locomotion distance: 50 feet.  Locomotion: FIM 5. Household exception. Patient ambulated 50 feet modified independent with walker.  Distance was limited due to RESENDIZ related to humidity.  Stairs: FIM 5. Clarification.  Patient performed 6 stairs while using the hand rails. Household exception.  Social Interaction: FIM 6. Patient is pleasant and cooperative and takes medication for mood.

## 2017-07-12 NOTE — MR AVS SNAPSHOT
After Visit Summary   7/12/2017    Jim Willingham    MRN: [id number]           Patient Information     Date Of Birth          1955        Visit Information        Provider Department      7/12/2017 9:00 AM 1, Uc Cv Device [facility]        Today's Diagnoses     NICM (nonischemic cardiomyopathy) (H)/ EF 20%    -  1      Care Instructions    It was a pleasure to see you in clinic today.  Please do not hesitate to call with any questions or concerns.  We look forward to seeing you in clinic at your next device check in 3 months.    Charleen Owen, RN, MS, CCRN  Electrophysiology Nurse Clinician  [facility]    During Business Hours Please Call:  [phone]  After Hours Please Call:  [phone] - select option #4 and ask for job code [phone]                    Follow-ups after your visit        Follow-up notes from your care team     Return in about 3 months (around 10/12/2017) for ICD Check.      Your next 10 appointments already scheduled     Jul 12, 2017  9:00 AM CDT   (Arrive by 8:45 AM)   Implanted Defibulator with Uc Cv Device 1   [facility])    [address] [place] [address]   [phone]            Jul 25, 2017  8:30 AM CDT   Lab with [facility]   [facility])    [address] [place] [address]   [phone]            Jul 25, 2017  9:00 AM CDT   (Arrive by 8:45 AM)   Ventricular Assist Device with Delisa Montgomery MD   [facility])    [address] [place] [address]   [phone]            Aug 11, 2017 11:00 AM CDT   Lab with [facility]   [facility] ([facility])    [address] [place] [address]   [phone]            Aug 11, 2017 11:30 AM CDT   (Arrive by 11:15 AM)   Ventricular Assist Device  with Delisa Montgomery MD   [facility] ([facility])    [address] [place] [address]   [phone]            Aug 21, 2017  2:00 PM CDT   Lab with [facility]   [facility] ([facility])    [address] [place] [phone]   [phone]            Aug 21, 2017  2:30 PM CDT   (Arrive by 2:15 PM)   Return Visit with Ronnie Quigley MD   [facility] ([facility])    [address] [place] [address]   [phone]            Sep 15, 2017  9:00 AM CDT   Lab with UC LAB   [facility] ([facility])    [address] [place] [address]   [phone]            Sep 15, 2017  9:30 AM CDT   (Arrive by 9:15 AM)   Implanted Defibulator with Uc Cv Device 1   [facility] ([facility])    [address] [place] [phone]   [phone]            Sep 15, 2017 10:00 AM CDT   (Arrive by 9:45 AM)   Ventricular Assist Device with Jeanine Wilson NP   [facility] ([facility])    [address] [place] [phone]              Who to contact     If you have questions or need follow up information about today's clinic visit or your schedule please contact [facility] directly at [phone].  Normal or non-critical lab and imaging results will be communicated to you by MyChart, letter or phone within 4 business days after the clinic has received the results. If you do not hear from us within 7 days, please contact the clinic through MyChart or phone. If you have a critical or abnormal lab result, we will notify you by phone as soon as possible.  Submit refill requests through [employer] or call your pharmacy and they will forward the refill request to us. Please allow 3 business days  "for your refill to be completed.          Additional Information About Your Visit        [employer] Information     [employer] lets you send messages to your doctor, view your test results, renew your prescriptions, schedule appointments and more. To sign up, go to [email or web address] . Click on \"Log in\" on the left side of the screen, which will take you to the Welcome page. Then click on \"Sign up Now\" on the right side of the page.     You will be asked to enter the access code listed below, as well as some personal information. Please follow the directions to create your username and password.     Your access code is: [email or web address]  Expires: 2017  6:30 AM     Your access code will  in 90 days. If you need help or a new code, please call your [place] clinic or [phone].        Care EveryWhere ID     This is your [facility] EveryWhere ID. This could be used by other organizations to access your [place] medical records  [id number]         Blood Pressure from Last 3 Encounters:   17 (!) 72/0   17 (!) 85/68   17 (!) 88/74    Weight from Last 3 Encounters:   17 110.6 kg (243 lb 12.8 oz)   17 110.8 kg (244 lb 4.8 oz)   17 113.5 kg (250 lb 3.2 oz)              We Performed the Following     ICD DEVICE PROGRAMMING EVAL, MULTI LEAD ICD        Primary Care Provider Office Phone # Fax #    Jovanyyobani Salas [phone]       [address] [address]        Equal Access to Services     [facility] : Hadii aad nav hadasho Somadihaali, waaxda luqadaha, qaybta kaalmada adeegyada, kendra marquez . So [facility] [phone].    ATENCIÓN: Si habla español, tiene a roger disposición servicios gratuitos de asistencia lingüística. Llame al [phone].    We comply with applicable federal civil rights laws and [place] laws. We do not discriminate on the basis of race, color, national origin, age, disability sex, sexual " orientation or gender identity.            Thank you!     Thank you for choosing [facility]  for your care. Our goal is always to provide you with excellent care. Hearing back from our patients is one way we can continue to improve our services. Please take a few minutes to complete the written survey that you may receive in the mail after your visit with us. Thank you!             Your Updated Medication List - Protect others around you: Learn how to safely use, store and throw away your medicines at www.disposemymeds.org.          This list is accurate as of: 7/12/17  8:05 AM.  Always use your most recent med list.                   Brand Name Dispense Instructions for use Diagnosis    acetaminophen 325 MG tablet    TYLENOL    100 tablet    Take 2 tablets (650 mg) by mouth every 6 hours    Acute post-operative pain       albuterol 108 (90 BASE) MCG/ACT Inhaler   Generic drug:  albuterol      Inhale 2 puffs into the lungs every 4 hours as needed for shortness of breath / dyspnea or wheezing        aspirin 81 MG chewable tablet     36 tablet    1 tablet (81 mg) by Oral or Feeding Tube route daily    LVAD (left ventricular assist device) present (H)       BREO ELLIPTA 200-25 MCG/INH oral inhaler   Generic drug:  fluticasone-vilanterol      Inhale 1 puff into the lungs daily        bumetanide 2 MG tablet    BUMEX     Take 1 tablet (2 mg) by mouth daily    LVAD (left ventricular assist device) present (H)       celeXA 20 MG tablet   Generic drug:  citalopram      Take 20 mg by mouth daily        cyanocobalamin 1000 MCG/ML injection    VITAMIN B12     Inject 1,000 mcg into the muscle every 30 days        HYDROmorphone 2 MG tablet    DILAUDID    45 tablet    Take 1 tablet (2 mg) by mouth every 4 hours as needed for moderate to severe pain    Acute post-operative pain       INCRUSE ELLIPTA 62.5 MCG/INH oral inhaler   Generic drug:  umeclidinium      Inhale 1 puff into the lungs daily        ipratropium -  albuterol 0.5 mg/2.5 mg/3 mL 0.5-2.5 (3) MG/3ML neb solution    DUONEB     Take 3 mLs by nebulization every 4 hours as needed for shortness of breath / dyspnea or wheezing        metFORMIN 500 MG tablet    GLUCOPHAGE    60 tablet    Take 0.5 tablets (250 mg) by mouth 2 times daily (with meals)    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, without long-term current use of insulin, unspecified CKD stage (H)       metoprolol 25 MG tablet    LOPRESSOR    60 tablet    Take 1 tablet (25 mg) by mouth 2 times daily    LVAD (left ventricular assist device) present (H), Paroxysmal atrial fibrillation (H)       pantoprazole 40 MG EC tablet    PROTONIX    30 tablet    Take 1 tablet (40 mg) by mouth every morning    Acute post-operative pain       polyethylene glycol Packet    MIRALAX/GLYCOLAX    7 packet    Take 17 g by mouth daily as needed for constipation (for no bm in 1 day)    Acute post-operative pain       potassium chloride SA 20 MEQ CR tablet    K-DUR/KLOR-CON M    60 tablet    Take 1 tablet (20 mEq) by mouth 2 times daily    LVAD (left ventricular assist device) present (H)       senna-docusate 8.6-50 MG per tablet    SENOKOT-S;PERICOLACE    100 tablet    Take 2 tablets by mouth 2 times daily as needed for constipation    Acute post-operative pain       SINGULAIR 10 MG tablet   Generic drug:  montelukast      Take 10 mg by mouth At Bedtime        traMADol 50 MG tablet    ULTRAM    28 tablet    Take 2 tablets (100 mg) by mouth every 6 hours as needed for moderate pain or breakthrough pain    Acute post-operative pain       traZODone 50 MG tablet    DESYREL    11 tablet    Take 50 mg by mouth nightly x one week then reduce to 25 mg (1/2 tab) by mouth nightly x one week then stop.    Acute post-operative pain, LVAD (left ventricular assist device) present (H)       warfarin 3 MG tablet    COUMADIN    30 tablet    Use as directed.    LVAD (left ventricular assist device) present (H)        zinc sulfate 220 (50 ZN) MG capsule    ZINCATE     Take 220 mg by mouth 2 times daily

## 2017-07-14 ENCOUNTER — CARE COORDINATION (OUTPATIENT)
Dept: CARDIOLOGY | Facility: CLINIC | Age: 62
End: 2017-07-14

## 2017-07-14 ENCOUNTER — ANTICOAGULATION THERAPY VISIT (OUTPATIENT)
Dept: ANTICOAGULATION | Facility: CLINIC | Age: 62
End: 2017-07-14

## 2017-07-14 DIAGNOSIS — Z95.811 LVAD (LEFT VENTRICULAR ASSIST DEVICE) PRESENT (H): ICD-10-CM

## 2017-07-14 DIAGNOSIS — Z79.01 LONG-TERM (CURRENT) USE OF ANTICOAGULANTS: ICD-10-CM

## 2017-07-14 LAB — INR PPP: 2.3

## 2017-07-14 NOTE — PROGRESS NOTES
ANTICOAGULATION FOLLOW-UP CLINIC VISIT    Patient Name:  Jim Willingham  Date:  7/14/2017  Contact Type:  Telephone    SUBJECTIVE:     Patient Findings     Positives No Problem Findings           OBJECTIVE    INR   Date Value Ref Range Status   07/14/2017 2.3  Final       ASSESSMENT / PLAN  INR assessment THER    Recheck INR In: 3 DAYS    IN[facility] Location [facility]      Anticoagulation Summary as of 7/14/2017     INR goal 2.0-3.0   Today's INR 2.3   Maintenance plan No maintenance plan   Full instructions 7/14: 4.5 mg; 7/15: 3 mg; 7/16: 4.5 mg; Otherwise No maintenance plan   Next INR check 7/17/2017   Target end date     Indications   LVAD (left ventricular assist device) present (H) [Z95.811]  Long-term (current) use of anticoagulants [Z79.01] [Z79.01]         Anticoagulation Episode Summary     INR check location     Preferred lab     Send INR reminders to [facility]    Comments HIPPA Forms mailed 6/26/17  [facility] [phone]      Anticoagulation Care Providers     Provider Role Specialty Phone number    Delisa Montgomery MD [facility] [phone]            See the Encounter Report to view Anticoagulation Flowsheet and Dosing Calendar (Go to Encounters tab in chart review, and find the Anticoagulation Therapy Visit)    Spoke with Aj BIRCH.    Maru Alonso RN

## 2017-07-14 NOTE — PROGRESS NOTES
Spoke w/ patient one week s/p discharge from VAD implant. States VAD parameters, VS, and weight all stable. Pt reports sleeping well, denies anxiety, reports good support system and is weaning off trazadone. Still having pain, but well controlled with dilaudid 2 mg Q 4-6 hr prn, trying to wean down. Reviewed cardiac medications and confirmed he is taking correct dose. Pt is working with PT, states he is becoming more active and needs to self-regulate to avoid being overly fatigued. Driveline is looking well, per wife.     Overall, pt report feeling better than expected. He is aware of upcoming appointment 7/25. He will page with any concerns or questions in the interim.

## 2017-07-14 NOTE — MR AVS SNAPSHOT
Jim Willingham   7/14/2017   Anticoagulation Therapy Visit    Description:  62 year old male   Provider:  Maru Alonso, RN   Department:  [facility]           INR as of 7/14/2017     Today's INR 2.3      Anticoagulation Summary as of 7/14/2017     INR goal 2.0-3.0   Today's INR 2.3   Full instructions 7/14: 4.5 mg; 7/15: 3 mg; 7/16: 4.5 mg; Otherwise No maintenance plan   Next INR check 7/17/2017    Indications   LVAD (left ventricular assist device) present (H) [Z95.811]  Long-term (current) use of anticoagulants [Z79.01] [Z79.01]         July 2017 Details    Sun Mon Tue Wed Thu Fri Sat           1                 2               3               4               5               6               7               8                 9               10               11               12               13               14      4.5 mg   See details      15      3 mg           16      4.5 mg         17            18               19               20               21               22                 23               24               25               26               27               28               29                 30               31                     Date Details   07/14 This INR check       Date of next INR:  7/17/2017         How to take your warfarin dose     To take:  3 mg Take 1 of the 3 mg tablets.    To take:  4.5 mg Take 1.5 of the 3 mg tablets.

## 2017-07-17 ENCOUNTER — ANTICOAGULATION THERAPY VISIT (OUTPATIENT)
Dept: ANTICOAGULATION | Facility: CLINIC | Age: 62
End: 2017-07-17

## 2017-07-17 ENCOUNTER — CARE COORDINATION (OUTPATIENT)
Dept: CARDIOLOGY | Facility: CLINIC | Age: 62
End: 2017-07-17

## 2017-07-17 DIAGNOSIS — Z95.811 LVAD (LEFT VENTRICULAR ASSIST DEVICE) PRESENT (H): ICD-10-CM

## 2017-07-17 DIAGNOSIS — Z79.01 LONG-TERM (CURRENT) USE OF ANTICOAGULANTS: ICD-10-CM

## 2017-07-17 LAB — INR POINT OF CARE: 2.3 (ref 0.86–1.14)

## 2017-07-17 RX ORDER — PREDNISONE 20 MG/1
40 TABLET ORAL DAILY
COMMUNITY
Start: 2017-07-17 | End: 2017-07-25

## 2017-07-17 NOTE — PROGRESS NOTES
Pt called this am to report sever gout flare in L elbow and foot. Pt reports he is unable to walk due to pain. Per report, he was previously on allopurinol and colchicine, but those meds were stopped after the last admission for LVAD implant. Spoke with Dr. Montgomery, who prescribed 40mg prednisone po x5 days and for pt to check in on Friday. If symptoms completely resolved, we can restart colchicine and allopurinol. Called pt to discuss instructions, pt verbalized understanding.

## 2017-07-17 NOTE — MR AVS SNAPSHOT
Jim Willingham   7/17/2017   Anticoagulation Therapy Visit    Description:  62 year old male   Provider:  Suyapa Crawford, RN   Department:  [facility]           INR as of 7/17/2017     Today's INR 2.3      Anticoagulation Summary as of 7/17/2017     INR goal 2.0-3.0   Today's INR 2.3   Full instructions 7/17: 4.5 mg; 7/18: 3 mg; 7/19: 4.5 mg; 7/20: 4.5 mg; Otherwise No maintenance plan   Next INR check 7/21/2017    Indications   LVAD (left ventricular assist device) present (H) [Z95.811]  Long-term (current) use of anticoagulants [Z79.01] [Z79.01]         July 2017 Details    Sun Mon Tue Wed Thu Fri Sat           1                 2               3               4               5               6               7               8                 9               10               11               12               13               14               15                 16               17      4.5 mg   See details      18      3 mg         19      4.5 mg         20      4.5 mg         21            22                 23               24               25               26               27               28               29                 30               31                     Date Details   07/17 This INR check       Date of next INR:  7/21/2017         How to take your warfarin dose     To take:  3 mg Take 1 of the 3 mg tablets.    To take:  4.5 mg Take 1.5 of the 3 mg tablets.

## 2017-07-17 NOTE — PROGRESS NOTES
ANTICOAGULATION FOLLOW-UP CLINIC VISIT    Patient Name:  Jim Willingham  Date:  7/17/2017  Contact Type:  Telephone    SUBJECTIVE:     Patient Findings     Positives No Problem Findings           OBJECTIVE    INR Protime   Date Value Ref Range Status   07/17/2017 2.3 (A) 0.86 - 1.14 Final       ASSESSMENT / PLAN  INR assessment THER    Recheck INR In: 4 DAYS    INR Location [facility]      Anticoagulation Summary as of 7/17/2017     INR goal 2.0-3.0   Today's INR 2.3   Maintenance plan No maintenance plan   Full instructions 7/17: 4.5 mg; 7/18: 3 mg; 7/19: 4.5 mg; 7/20: 4.5 mg; Otherwise No maintenance plan   Next INR check 7/21/2017   Target end date     Indications   LVAD (left ventricular assist device) present (H) [Z95.811]  Long-term (current) use of anticoagulants [Z79.01] [Z79.01]         Anticoagulation Episode Summary     INR check location     Preferred lab     Send INR reminders to [facility]    Comments HIPPA Forms mailed 6/26/17  [facility] [phone]      Anticoagulation Care Providers     Provider Role Specialty Phone number    Delisa Montgomery MD Responsible Cardiology [phone]            See the Encounter Report to view Anticoagulation Flowsheet and Dosing Calendar (Go to Encounters tab in chart review, and find the Anticoagulation Therapy Visit)    Spoke with home care nurse Claudia.  Suspect pt will require two days/wk of 3 mg, and 4.5 mg all other days of the week.  Will check INR in 4 days to ensure we are on the right track with this dosing plan.    Suyapa Crawford RN

## 2017-07-21 ENCOUNTER — ANTICOAGULATION THERAPY VISIT (OUTPATIENT)
Dept: ANTICOAGULATION | Facility: CLINIC | Age: 62
End: 2017-07-21

## 2017-07-21 DIAGNOSIS — Z95.811 LVAD (LEFT VENTRICULAR ASSIST DEVICE) PRESENT (H): ICD-10-CM

## 2017-07-21 DIAGNOSIS — Z79.01 LONG-TERM (CURRENT) USE OF ANTICOAGULANTS: ICD-10-CM

## 2017-07-21 LAB — INR POINT OF CARE: 2.1 (ref 0.86–1.14)

## 2017-07-21 NOTE — MR AVS SNAPSHOT
Jim Willingham   7/21/2017   Anticoagulation Therapy Visit    Description:  62 year old male   Provider:  Suyapa Crawford, RN   Department:  [facility]           INR as of 7/21/2017     Today's INR 2.1      Anticoagulation Summary as of 7/21/2017     INR goal 2.0-3.0   Today's INR 2.1   Full instructions 7/21: 4.5 mg; 7/22: 4.5 mg; 7/23: 4.5 mg; 7/24: 3 mg; Otherwise No maintenance plan   Next INR check 7/25/2017    Indications   LVAD (left ventricular assist device) present (H) [Z95.811]  Long-term (current) use of anticoagulants [Z79.01] [Z79.01]         July 2017 Details    Sun Mon Tue Wed Thu Fri Sat           1                 2               3               4               5               6               7               8                 9               10               11               12               13               14               15                 16               17               18               19               20               21      4.5 mg   See details      22      4.5 mg           23      4.5 mg         24      3 mg         25            26               27               28               29                 30               31                     Date Details   07/21 This INR check       Date of next INR:  7/25/2017         How to take your warfarin dose     To take:  3 mg Take 1 of the 3 mg tablets.    To take:  4.5 mg Take 1.5 of the 3 mg tablets.

## 2017-07-21 NOTE — PROGRESS NOTES
ANTICOAGULATION FOLLOW-UP CLINIC VISIT    Patient Name:  Jim Willingham  Date:  7/21/2017  Contact Type:  Telephone    SUBJECTIVE:        OBJECTIVE    INR Protime   Date Value Ref Range Status   07/21/2017 2.1 (A) 0.86 - 1.14 Final       ASSESSMENT / PLAN  INR assessment THER    Recheck INR In: 4 DAYS    [facility] Location [facility]      Anticoagulation Summary as of 7/21/2017     INR goal 2.0-3.0   Today's INR 2.1   Maintenance plan No maintenance plan   Full instructions 7/21: 4.5 mg; 7/22: 4.5 mg; 7/23: 4.5 mg; 7/24: 3 mg; Otherwise No maintenance plan   Next INR check 7/25/2017   Target end date     Indications   LVAD (left ventricular assist device) present (H) [Z95.811]  Long-term (current) use of anticoagulants [Z79.01] [Z79.01]         Anticoagulation Episode Summary     INR check location     Preferred lab     Send INR reminders to [facility]    Comments HIPPA Forms mailed 6/26/17  [facility] [phone]      Anticoagulation Care Providers     Provider Role Specialty Phone number    Delisa Montgomery MD [facility] Cardiology [phone]            See the Encounter Report to view Anticoagulation Flowsheet and Dosing Calendar (Go to Encounters tab in chart review, and find the Anticoagulation Therapy Visit)    Spoke with home care nurse Aj.  Next INR to be done on 7/25 when pt has MD appointment.  On that date, please call home care nurse Sulaiman as well as the pt with instructions & date for next INR.  Sulaiman is at : [phone]  Continue to suspect a maintenance dose for pt will be one or two days/wk of 3 mg., and all other days 4.5 mg.    Suyapa Crawford RN

## 2017-07-24 DIAGNOSIS — I50.22 CHRONIC SYSTOLIC CONGESTIVE HEART FAILURE (H): ICD-10-CM

## 2017-07-24 DIAGNOSIS — Z95.811 LVAD (LEFT VENTRICULAR ASSIST DEVICE) PRESENT (H): Primary | ICD-10-CM

## 2017-07-25 ENCOUNTER — ANTICOAGULATION THERAPY VISIT (OUTPATIENT)
Dept: ANTICOAGULATION | Facility: CLINIC | Age: 62
End: 2017-07-25

## 2017-07-25 ENCOUNTER — OFFICE VISIT (OUTPATIENT)
Dept: CARDIOLOGY | Facility: CLINIC | Age: 62
End: 2017-07-25
Attending: INTERNAL MEDICINE
Payer: COMMERCIAL

## 2017-07-25 VITALS
HEIGHT: 68 IN | BODY MASS INDEX: 37.07 KG/M2 | OXYGEN SATURATION: 98 % | WEIGHT: 244.6 LBS | HEART RATE: 83 BPM | SYSTOLIC BLOOD PRESSURE: 82 MMHG

## 2017-07-25 DIAGNOSIS — Z95.811 LVAD (LEFT VENTRICULAR ASSIST DEVICE) PRESENT (H): ICD-10-CM

## 2017-07-25 DIAGNOSIS — G89.18 ACUTE POST-OPERATIVE PAIN: ICD-10-CM

## 2017-07-25 DIAGNOSIS — I50.22 CHRONIC SYSTOLIC CONGESTIVE HEART FAILURE (H): ICD-10-CM

## 2017-07-25 DIAGNOSIS — Z79.01 LONG-TERM (CURRENT) USE OF ANTICOAGULANTS: ICD-10-CM

## 2017-07-25 DIAGNOSIS — Z95.811 LVAD (LEFT VENTRICULAR ASSIST DEVICE) PRESENT (H): Primary | ICD-10-CM

## 2017-07-25 DIAGNOSIS — M10.00 ACUTE IDIOPATHIC GOUT, UNSPECIFIED SITE: ICD-10-CM

## 2017-07-25 LAB
ALBUMIN SERPL-MCNC: 3.7 G/DL (ref 3.4–5)
ALP SERPL-CCNC: 89 U/L (ref 40–150)
ALT SERPL W P-5'-P-CCNC: 36 U/L (ref 0–70)
ANION GAP SERPL CALCULATED.3IONS-SCNC: 7 MMOL/L (ref 3–14)
AST SERPL W P-5'-P-CCNC: 24 U/L (ref 0–45)
BILIRUB SERPL-MCNC: 0.6 MG/DL (ref 0.2–1.3)
BUN SERPL-MCNC: 24 MG/DL (ref 7–30)
CALCIUM SERPL-MCNC: 8.7 MG/DL (ref 8.5–10.1)
CHLORIDE SERPL-SCNC: 99 MMOL/L (ref 94–109)
CO2 SERPL-SCNC: 30 MMOL/L (ref 20–32)
CREAT SERPL-MCNC: 1.16 MG/DL (ref 0.66–1.25)
ERYTHROCYTE [DISTWIDTH] IN BLOOD BY AUTOMATED COUNT: 14.6 % (ref 10–15)
GFR SERPL CREATININE-BSD FRML MDRD: 64 ML/MIN/1.7M2
GLUCOSE SERPL-MCNC: 105 MG/DL (ref 70–99)
HCT VFR BLD AUTO: 34.4 % (ref 40–53)
HGB BLD-MCNC: 10.5 G/DL (ref 13.3–17.7)
INR PPP: 2.22 (ref 0.86–1.14)
LDH SERPL L TO P-CCNC: 273 U/L (ref 85–227)
MCH RBC QN AUTO: 27.4 PG (ref 26.5–33)
MCHC RBC AUTO-ENTMCNC: 30.5 G/DL (ref 31.5–36.5)
MCV RBC AUTO: 90 FL (ref 78–100)
PLATELET # BLD AUTO: 281 10E9/L (ref 150–450)
POTASSIUM SERPL-SCNC: 3.9 MMOL/L (ref 3.4–5.3)
PROT SERPL-MCNC: 6.9 G/DL (ref 6.8–8.8)
RBC # BLD AUTO: 3.83 10E12/L (ref 4.4–5.9)
SODIUM SERPL-SCNC: 136 MMOL/L (ref 133–144)
WBC # BLD AUTO: 7.6 10E9/L (ref 4–11)

## 2017-07-25 PROCEDURE — 85610 PROTHROMBIN TIME: CPT | Performed by: INTERNAL MEDICINE

## 2017-07-25 PROCEDURE — 83615 LACTATE (LD) (LDH) ENZYME: CPT | Performed by: INTERNAL MEDICINE

## 2017-07-25 PROCEDURE — 80053 COMPREHEN METABOLIC PANEL: CPT | Performed by: INTERNAL MEDICINE

## 2017-07-25 PROCEDURE — 36415 COLL VENOUS BLD VENIPUNCTURE: CPT | Performed by: INTERNAL MEDICINE

## 2017-07-25 PROCEDURE — 99215 OFFICE O/P EST HI 40 MIN: CPT | Mod: 25,ZF

## 2017-07-25 PROCEDURE — 99214 OFFICE O/P EST MOD 30 MIN: CPT | Mod: 25 | Performed by: INTERNAL MEDICINE

## 2017-07-25 PROCEDURE — 85027 COMPLETE CBC AUTOMATED: CPT | Performed by: INTERNAL MEDICINE

## 2017-07-25 PROCEDURE — 93750 INTERROGATION VAD IN PERSON: CPT | Mod: ZF | Performed by: INTERNAL MEDICINE

## 2017-07-25 RX ORDER — LOSARTAN POTASSIUM 25 MG/1
25 TABLET ORAL DAILY
Qty: 60 TABLET | Refills: 3 | Status: SHIPPED | OUTPATIENT
Start: 2017-07-25 | End: 2018-01-31

## 2017-07-25 RX ORDER — BUMETANIDE 2 MG/1
2 TABLET ORAL DAILY
Qty: 60 TABLET | Refills: 3 | Status: SHIPPED | OUTPATIENT
Start: 2017-07-25 | End: 2017-08-28

## 2017-07-25 RX ORDER — PREDNISONE 20 MG/1
20 TABLET ORAL DAILY
Qty: 20 TABLET | Refills: 0 | Status: SHIPPED | OUTPATIENT
Start: 2017-07-25 | End: 2018-04-11

## 2017-07-25 RX ORDER — PANTOPRAZOLE SODIUM 40 MG/1
40 TABLET, DELAYED RELEASE ORAL EVERY MORNING
Qty: 30 TABLET | Refills: 1 | Status: SHIPPED | OUTPATIENT
Start: 2017-07-25 | End: 2017-10-03

## 2017-07-25 ASSESSMENT — PAIN SCALES - GENERAL: PAINLEVEL: NO PAIN (0)

## 2017-07-25 NOTE — MR AVS SNAPSHOT
After Visit Summary   7/25/2017    Jim Willingham    MRN: [id number]           Patient Information     Date Of Birth          1955        Visit Information        Provider Department      7/25/2017 12:00 PM Delisa Montgomery MD [facility]        Today's Diagnoses     LVAD (left ventricular assist device) present (H)    -  1    Chronic systolic congestive heart failure (H)          Care Instructions    Medications:  1. START taking Cozaar 25mg daily  Follow-up:  1. 8/11with Dr. Montgomery. You will get an echocardiogram at this visit  Instructions:  1. For your gout flare - take prednisone 20mg daily for 3 days, then 10mg daily for 3 days then 5mg daily for 3 days. If your pain is completely resolved, you can re-start Allopurinol and Colchicine. If the pain does not resolve, call the VAD coordinator for further instructions.     Great to see you in clinic today. Let us know if you need anything anytime via our pager at [phone] option #4 and ask to speak to the VAD coordinator on call.               Follow-ups after your visit        Your next 10 appointments already scheduled     Aug 11, 2017  9:00 AM CDT   [facility] ([facility])    [address] [facility] [address] [address]              Patient must bring picture ID.  Patient should be prepared to give a urine specimen  Please do not eat 10-12 hours before your appointment if you are coming in fasting for labs on lipids, cholesterol, or glucose (sugar).  Pregnant women should follow their Care Team instructions. Water with medications is okay. Do not drink coffee or other fluids.   If you have concerns about taking  your medications, please ask at office or if scheduling via [employer], send a message by clicking on Secure Messaging, Message Your Care Team.            Aug 11, 2017  9:30 AM CDT   Ech Lvad Ramp with [facility]   [facility]   [facility]ography ([facility])    [address]  [place] [address]   [phone]           1.  Please bring or wear a comfortable two-piece outfit. 2.  You may eat, drink and take your normal medicines. 3.  For any questions that cannot be answered, please contact the ordering physician            Aug 11, 2017 11:30 AM CDT   (Arrive by 11:15 AM)   Ventricular Assist Device with Delisa Montgomery MD   [facility] ([facility])    [address] [place] [address]   [phone]            Aug 21, 2017  2:00 PM CDT   Lab with UC LAB   [facility] ([facility])    [address] [place] [address]   [phone]            Aug 21, 2017  2:30 PM CDT   (Arrive by 2:15 PM)   Return Visit with Ronnie Quigley MD   [facility] ([facility])    [address] [place] [address]   [phone]            Sep 15, 2017  9:00 AM CDT   Lab with UC LAB   [facility] ([facility])    [address] [place] [address]   [phone]            Sep 15, 2017  9:30 AM CDT   (Arrive by 9:15 AM)   Implanted Defibulator with Uc Cv Device [address] [place] [address] ([facility])    [address] [place] [address]   [phone]            Sep 15, 2017 10:00 AM CDT   (Arrive by 9:45 AM)   Ventricular Assist Device with Jeanine Wilson NP   [facility] ([facility])    [address] [place] [address]   [phone]            Dec 15, 2017  8:30 AM CST   (Arrive by 8:15 AM)   Implanted Defibulator with Uc Cv Device 1   [facility] ([facility])    [address] [place] [address]   [phone]            Dec 15,  "  9:00 AM CST   (Arrive by 8:45 AM)   Ventricular Assist Device with Delisa Montgomery MD   [facility] ([facility])    [address] [place] [address] [phone]              Future tests that were ordered for you today     Open Future Orders        Priority Expected Expires Ordered    ECHO LVAD RAMP Routine 2017            Who to contact     If you have questions or need follow up information about today's clinic visit or your schedule please contact [facility] directly at [phone].  Normal or non-critical lab and imaging results will be communicated to you by [employer], letter or phone within 4 business days after the clinic has received the results. If you do not hear from us within 7 days, please contact the clinic through [employer] or phone. If you have a critical or abnormal lab result, we will notify you by phone as soon as possible.  Submit refill requests through [employer] or call your pharmacy and they will forward the refill request to us. Please allow 3 business days for your refill to be completed.          Additional Information About Your Visit        [employer] Information     [employer] lets you send messages to your doctor, view your test results, renew your prescriptions, schedule appointments and more. To sign up, go to [email or web address] . Click on \"Log in\" on the left side of the screen, which will take you to the Welcome page. Then click on \"Sign up Now\" on the right side of the page.     You will be asked to enter the access code listed below, as well as some personal information. Please follow the directions to create your username and password.     Your access code is: [email or web address]  Expires: 2017  6:30 AM     Your access code will  in 90 days. If you need help or a new code, please call your [facility] clinic or [phone].        Care EveryWhere ID     This is your Care " "EveryWhere ID. This could be used by other organizations to access your [facility] medical records  [id number]        Your Vitals Were     Pulse Height Pulse Oximetry BMI (Body Mass Index)          83 1.727 m (5' 8\") 98% 37.19 kg/m2         Blood Pressure from Last 3 Encounters:   07/25/17 (!) 82/0   07/12/17 (!) 72/0   07/07/17 (!) 85/68    Weight from Last 3 Encounters:   07/25/17 110.9 kg (244 lb 9.6 oz)   07/12/17 110.6 kg (243 lb 12.8 oz)   07/07/17 110.8 kg (244 lb 4.8 oz)              Today, you had the following     No orders found for display       Primary Care Provider Office Phone # Fax #    Jovany Salas [phone]       [place] [address]        Equal Access to Services     [facility]: Hadii aad ku hadasho Soomaali, waaxda luqadaha, qaybta kaalmada adeegyada, waxay oseasin haynirn lynnette marquez . So [facility] [phone].    ATENCIÓN: Si habla español, tiene a roger disposición servicios gratuitos de asistencia lingüística. Llame al [phone].    We comply with applicable federal civil rights laws and [place] laws. We do not discriminate on the basis of race, color, national origin, age, disability sex, sexual orientation or gender identity.            Thank you!     Thank you for choosing [facility]  for your care. Our goal is always to provide you with excellent care. Hearing back from our patients is one way we can continue to improve our services. Please take a few minutes to complete the written survey that you may receive in the mail after your visit with us. Thank you!             Your Updated Medication List - Protect others around you: Learn how to safely use, store and throw away your medicines at www.disposemymeds.org.          This list is accurate as of: 7/25/17 12:59 PM.  Always use your most recent med list.                   Brand Name Dispense Instructions for use Diagnosis    acetaminophen 325 MG tablet    " TYLENOL    100 tablet    Take 2 tablets (650 mg) by mouth every 6 hours    Acute post-operative pain       albuterol 108 (90 BASE) MCG/ACT Inhaler   Generic drug:  albuterol      Inhale 2 puffs into the lungs every 4 hours as needed for shortness of breath / dyspnea or wheezing        aspirin 81 MG chewable tablet     36 tablet    1 tablet (81 mg) by Oral or Feeding Tube route daily    LVAD (left ventricular assist device) present (H)       BREO ELLIPTA 200-25 MCG/INH oral inhaler   Generic drug:  fluticasone-vilanterol      Inhale 1 puff into the lungs daily        bumetanide 2 MG tablet    BUMEX     Take 1 tablet (2 mg) by mouth daily    LVAD (left ventricular assist device) present (H)       celeXA 20 MG tablet   Generic drug:  citalopram      Take 20 mg by mouth daily        cyanocobalamin 1000 MCG/ML injection    VITAMIN B12     Inject 1,000 mcg into the muscle every 30 days        HYDROmorphone 2 MG tablet    DILAUDID    45 tablet    Take 1 tablet (2 mg) by mouth every 4 hours as needed for moderate to severe pain    Acute post-operative pain       INCRUSE ELLIPTA 62.5 MCG/INH oral inhaler   Generic drug:  umeclidinium      Inhale 1 puff into the lungs daily        ipratropium - albuterol 0.5 mg/2.5 mg/3 mL 0.5-2.5 (3) MG/3ML neb solution    DUONEB     Take 3 mLs by nebulization every 4 hours as needed for shortness of breath / dyspnea or wheezing        metFORMIN 500 MG tablet    GLUCOPHAGE    60 tablet    Take 0.5 tablets (250 mg) by mouth 2 times daily (with meals)    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, without long-term current use of insulin, unspecified CKD stage (H)       metoprolol 25 MG 24 hr tablet    TOPROL-XL    180 tablet    Take 1 tablet (25 mg) by mouth 2 times daily    Chronic systolic congestive heart failure (H), LVAD (left ventricular assist device) present (H)       pantoprazole 40 MG EC tablet    PROTONIX    30 tablet    Take 1 tablet (40 mg) by mouth  every morning    Acute post-operative pain       polyethylene glycol Packet    MIRALAX/GLYCOLAX    7 packet    Take 17 g by mouth daily as needed for constipation (for no bm in 1 day)    Acute post-operative pain       predniSONE 20 MG tablet    DELTASONE     Take 2 tablets (40 mg) by mouth daily        senna-docusate 8.6-50 MG per tablet    SENOKOT-S;PERICOLACE    100 tablet    Take 2 tablets by mouth 2 times daily as needed for constipation    Acute post-operative pain       SINGULAIR 10 MG tablet   Generic drug:  montelukast      Take 10 mg by mouth At Bedtime        spironolactone 25 MG tablet    ALDACTONE    45 tablet    Take 0.5 tablets (12.5 mg) by mouth daily    Chronic systolic congestive heart failure (H), LVAD (left ventricular assist device) present (H)       traMADol 50 MG tablet    ULTRAM    28 tablet    Take 2 tablets (100 mg) by mouth every 6 hours as needed for moderate pain or breakthrough pain    Acute post-operative pain       traZODone 50 MG tablet    DESYREL    11 tablet    Take 50 mg by mouth nightly x one week then reduce to 25 mg (1/2 tab) by mouth nightly x one week then stop.    Acute post-operative pain, LVAD (left ventricular assist device) present (H)       warfarin 3 MG tablet    COUMADIN    30 tablet    Use as directed.    LVAD (left ventricular assist device) present (H)       zinc sulfate 220 (50 ZN) MG capsule    ZINCATE     Take 220 mg by mouth 2 times daily

## 2017-07-25 NOTE — NURSING NOTE
Chief Complaint   Patient presents with     Follow Up For     VAD F/U 5wks     Vitals were taken and medications were reconciled.     Luis E Hernandez MA  11:45 AM

## 2017-07-25 NOTE — MR AVS SNAPSHOT
Jim Willingham   7/25/2017   Anticoagulation Therapy Visit    Description:  62 year old male   Provider:  Delisa Hillman, RN   Department:  [facility]           INR as of 7/25/2017     Today's INR 2.22      Anticoagulation Summary as of 7/25/2017     INR goal 2.0-3.0   Today's INR 2.22   Full instructions 7/25: 4.5 mg; 7/26: 4.5 mg; 7/27: 4.5 mg; 7/28: 4.5 mg; 7/29: 4.5 mg; 7/30: 4.5 mg; 7/31: 4.5 mg; Otherwise No maintenance plan   Next INR check 8/1/2017    Indications   LVAD (left ventricular assist device) present (H) [Z95.811]  Long-term (current) use of anticoagulants [Z79.01] [Z79.01]         July 2017 Details    Sun Mon Tue Wed Thu Fri Sat           1                 2               3               4               5               6               7               8                 9               10               11               12               13               14               15                 16               17               18               19               20               21               22                 23               24               25      4.5 mg   See details      26      4.5 mg         27      4.5 mg         28      4.5 mg         29      4.5 mg           30      4.5 mg         31      4.5 mg               Date Details   07/25 This INR check               How to take your warfarin dose     To take:  4.5 mg Take 1.5 of the 3 mg tablets.           August 2017 Details    Sun Mon Tue Wed Thu Fri Sat       1            2               3               4               5                 6               7               8               9               10               11               12                 13               14               15               16               17               18               19                 20               21               22               23               24               25               26                 27               28               29                30               31                  Date Details   No additional details    Date of next INR:  8/1/2017

## 2017-07-25 NOTE — PROGRESS NOTES
"07/25/17      Dear Buddy,     I had the pleasure of seeing Jim Willingham in the [facility] on June 8, 2017. As you know he is a pleasant 62 year old male with a long standing history of non- ischemic cardiomyopathy who has been declining, particularly over the last year.     He had been medically managed in the setting of his NICM and was admitted for an expedited workup in May for LVAD as he was hypotensive with worsening cardiac function despite medical therapy.  He underwent LVAD placement on 6/19/2017, followed by rehabilitation without any major complications or adverse events.    Today, he comes to clinic with his wife and newly adopted granddaughter.  He is 5 weeks out from his LVAD and feels great.  He has more energy than he remembers and says \"I feel 15 years younger\".  He has had no interval hospital admissions. He did have a gout flare and is getting better, but not 100% yet.  He has been losing water weight and is down to 234 lbs according to his home scale.  Watching salt in his diet.  He is compliant with his medications but has forgot to take his aspirin, which he will restart.  No alarms or PI events.  No palpitations, syncope, chest pain, abdominal pain, stroke symptoms, syncope, loss of consciousness, orthopnea, PND or infectious symptoms.  He also states that his breathing is improved as well.    PAST MEDICAL HISTORY:  Past Medical History:   Diagnosis Date     Benign essential hypertension 5/11/2017     Cardiomyopathy, unspecified (H) 5/8/2017     CKD (chronic kidney disease) stage 3, GFR 30-59 ml/min 5/11/2017     Depression 5/11/2017     Diabetes mellitus (H) 5/11/2017     H/O gastric bypass 5/11/2017     ICD (implantable cardioverter-defibrillator), biventricular, in situ 5/11/2017     NICM (nonischemic cardiomyopathy) (H)/ EF 20% 5/11/2017    ECHO: LVEDd. 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 They have several children (from separate partners) and they have just adopted a granddaughter who he is the primary care giver.     He stopped ETOH in  2010 and would drink socially before that but never had any life difficulties related to alcohol abuse.     He stopped smoking in 1994. No ilicits.     Family history: there is no family history of premature MI or SCD, or CHF      CURRENT MEDICATIONS:  Current Outpatient Prescriptions   Medication Sig Dispense Refill     metoprolol (TOPROL-XL) 25 MG 24 hr tablet Take 1 tablet (25 mg) by mouth 2 times daily 180 tablet 3     metFORMIN (GLUCOPHAGE) 500 MG tablet Take 0.5 tablets (250 mg) by mouth 2 times daily (with meals) 60 tablet 0     pantoprazole (PROTONIX) 40 MG EC tablet Take 1 tablet (40 mg) by mouth every morning 30 tablet 0     senna-docusate (SENOKOT-S;PERICOLACE) 8.6-50 MG per tablet Take 2 tablets by mouth 2 times daily as needed for constipation 100 tablet 0     traZODone (DESYREL) 50 MG tablet Take 50 mg by mouth nightly x one week then reduce to 25 mg (1/2 tab) by mouth nightly x one week then stop. 11 tablet 0     warfarin (COUMADIN) 3 MG tablet Use as directed. 30 tablet 0     traMADol (ULTRAM) 50 MG tablet Take 2 tablets (100 mg) by mouth every 6 hours as needed for moderate pain or breakthrough pain 28 tablet      acetaminophen (TYLENOL) 325 MG tablet Take 2 tablets (650 mg) by mouth every 6 hours 100 tablet      bumetanide (BUMEX) 2 MG tablet Take 1 tablet (2 mg) by mouth daily       albuterol (ALBUTEROL) 108 (90 BASE) MCG/ACT Inhaler Inhale 2 puffs into the lungs every 4 hours as needed for shortness of breath / dyspnea or wheezing       ipratropium -  albuterol 0.5 mg/2.5 mg/3 mL (DUONEB) 0.5-2.5 (3) MG/3ML neb solution Take 3 mLs by nebulization every 4 hours as needed for shortness of breath / dyspnea or wheezing       citalopram (CELEXA) 20 MG tablet Take 20 mg by mouth daily       cyanocobalamin (VITAMIN B12) 1000 MCG/ML injection Inject 1,000 mcg into the muscle every 30 days       fluticasone-vilanterol (BREO ELLIPTA) 200-25 MCG/INH oral inhaler Inhale 1 puff into the lungs daily       montelukast (SINGULAIR) 10 MG tablet Take 10 mg by mouth At Bedtime       umeclidinium (INCRUSE ELLIPTA) 62.5 MCG/INH oral inhaler Inhale 1 puff into the lungs daily       zinc sulfate (ZINCATE) 220 (50 ZN) MG capsule Take 220 mg by mouth 2 times daily       predniSONE (DELTASONE) 20 MG tablet Take 2 tablets (40 mg) by mouth daily       spironolactone (ALDACTONE) 25 MG tablet Take 0.5 tablets (12.5 mg) by mouth daily (Patient not taking: Reported on 7/25/2017) 45 tablet 3     aspirin 81 MG chewable tablet 1 tablet (81 mg) by Oral or Feeding Tube route daily (Patient not taking: Reported on 7/25/2017) 36 tablet      HYDROmorphone (DILAUDID) 2 MG tablet Take 1 tablet (2 mg) by mouth every 4 hours as needed for moderate to severe pain (Patient not taking: Reported on 7/25/2017) 45 tablet 0     polyethylene glycol (MIRALAX/GLYCOLAX) Packet Take 17 g by mouth daily as needed for constipation (for no bm in 1 day) (Patient not taking: Reported on 7/25/2017) 7 packet        ROS:   Constitutional: No fever, chills, or sweats. Weight is stable at present  ENT: No visual disturbance, ear ache, epistaxis, sore throat.   Allergies/Immunologic: Negative.   Respiratory: No cough, hemoptysis.   Cardiovascular: As per HPI.   GI: No nausea, vomiting, hematemesis, melena, or hematochezia.   : No urinary frequency, dysuria, or hematuria.   Integument: Negative.   Psychiatric: worried about the state of his health    Neuro: Negative.   Endocrinology: Negative.   Musculoskeletal:  "Negative.    EXAM:  BP (!) 82/0 (BP Location: Left arm, Patient Position: Chair, Cuff Size: Adult Regular)  Pulse 83  Ht 1.727 m (5' 8\")  Wt 110.9 kg (244 lb 9.6 oz)  SpO2 98%  BMI 37.19 kg/m2  General: appears comfortable, alert and articulate, obese   Head: normocephalic, atraumatic  Eyes: anicteric sclera, EOMI  Neck: no adenopathy  Orophyarynx: moist mucosa, no lesions, dentition intact  Heart: LVAD hum heart throughout precordium, JVP appears at the level of the clavicle, trace to 1+ LE edema  Abdomen: soft, non-tender, bowel sounds present, no hepatosplenomegaly   Neurological: normal speech and affect, no gross motor deficits      Last coronary angiogram and RHC May 10, 2017       Pressures      Phase:Baseline      AO :  70.00 mmHg / 49.00 mmHg ( 57.00 mmHg )  @ 10:39:59 AM            72.00 mmHg / 49.00 mmHg ( 58.00 mmHg )  @ 10:40:10 AM      LV :  83.00 mmHg / 16.00 mmHg / 23.00 mmHg  @ 10:46:50 AM      LV Pull back :  85.00 mmHg / 15.00 mmHg / 23.00 mmHg  @ 10:46:59 AM      AO Pull back :  86.00 mmHg / 51.00 mmHg ( 74.00 mmHg )  @ 10:47:06 AM      RA :  a wave = v wave = mean = 15.00 mmHg  @ 11:06:43 AM      RV :  44.00 mmHg / 15.00 mmHg / 18.00 mmHg  @ 11:06:27 AM      PA :  41.00 mmHg / 20.00 mmHg ( 32.00 mmHg )  @ 10:59:05 AM      PCW :  a wave = v wave = mean = 32.00 mmHg  @ 11:00:00 AM    Valves      Phase:Baseline      AV :  0.00 mmHg  @ 10:22:09 AM      AV Mean Gradient:  13.70 mmHg  @ 10:22:09 AM      AV Valve Area:  1.27 cm2  @ 10:22:09 AM      Cardiac Output      Phase:Baseline      Thermo Cardiac Output:  4.77 l/min  @ 10:22:09 AM    Flow      Phase:Baseline      Qp :  4.77 l/min  @ 10:22:09 AM      Qs :  4.77 l/min  @ 10:22:09 AM         Status       Last echocardiogram:    Severely increased left ventricular size. LVEDd is 7.66 cm   2. LV function is severely reduced. The visually estimated ejection fraction is 20%.   3. Septal motion consistent with conduction delay.   4. Restrictive " pattern of diastolic filling consistent with severe diastolic dysfunction.   5. Severely dilated left atrium.   6. Severe mitral valve regurgitation.   7. Moderate pulmonary hypertension. PAP 48 mmHG plus RAP, or approximately 55-58 mmHG.   8. Compared to prior study (1/26/2016); The EF remains severely depressed at 20%. Mitral regurgitation has increased from moderate to severe, likely due to the patient's cardiomyopathy. PAP has increased from 22 mmHG plus RAP to 48 mmHG feliz RAP.   9. The rhythm is normal sinus.  ualized. Trace pulmonic valve regurgitation.    LVIDd (2D):     7.66 cm (3.4-5.7) LA Major diam,s, A2c 7.5 cm      Pulmonary function tests:     The Patient reportedly appeared to give maximal effort.  The PFT   testing was performed on calibrated equipment and recorded in   compliance with the ATS/ERS Task Force Standardization of Lung   Function Testing. The PFT testing was performed in the sitting   position.    After acceptable spirograms had been obtained, the following   values were obtained and/or calculated:    Pre bronchodilator Spirometry:   SVC 2.35 L (61 %)  DLCO unc 16.89 ml/min/mmHg (59 %)  DLCOcor 17.57 ml/min/mmHg (62 %)  HGB 13.3 gm/dL    INTERPRETATION:    Slow vital capacity (SVC) is moderately reduced.    The DLCO is corrected for hemoglobin and is moderately reduced.    An isolated reduction in DLCO with normal lung volumes is seen in   patients with anemia, chronic pulmonary embolism, primary   pulmonary hypertension, carboxyhemoglobinemia, vasculitis or   early interstitial lung disease.      ECG: (per north) V pacing rate 90     Labs:    Lab Results   Component Value Date    WBC 7.6 07/25/2017    HGB 10.5 (L) 07/25/2017    HCT 34.4 (L) 07/25/2017     07/25/2017     07/25/2017    POTASSIUM 3.9 07/25/2017    CHLORIDE 99 07/25/2017    CO2 30 07/25/2017    BUN 24 07/25/2017    CR 1.16 07/25/2017     (H) 07/25/2017    SED 25 (H) 05/26/2017    NTBNPI 4216 (H)  06/15/2017    NTBNP 2502 (H) 05/12/2017    AST 24 07/25/2017    ALT 36 07/25/2017    ALKPHOS 89 07/25/2017    BILITOTAL 0.6 07/25/2017    INR 2.22 (H) 07/25/2017       Assessment and Plan:   In summary this is a very pleasant 62 M with a history of NICM who is now s/p LVAD on 6/19/2017.  He appears to be doing well today from a volume standpoint.  His energy level is improved, he is losing weight, his renal function is now normal.    With regards to his NICM, his LVAD appears to be functioning as anticipated.  There are no PI events.  Warfarin is at goal.  He will start his daily ASA 81 mg.  He is on Toprol-XL 25 mg daily, Bumex 2 mg daily, spironolactone 12.5 mg daily.  Today, given he has had significant improvement in his renal function, we will add losartan 25 mg daily to further medically optimize.  He will have a repeat echocardiogram in 2 weeks.  He will f/u with the [facility] in a few weeks as well with a chem panel and to consider turning up the speed of his LVAD at that time.  He will continue to work on weight loss.  We discussed limiting carolic intake and the goal of BMI < 35 for transplant consideration.    With regards to his pulmonary function, he is off the amiodarone.  He subjectively feels that his breathing is better but will repeat his PFTs in 3 months and if abnormal at that time, consider CT scan, steroids and pulmonary referral.      1. Chronic systolic heart failure secondary NICM EF 20%  Stage D  NYHA Class II  ACEi/ARB yes - Start losartan 25 mg daily  BB yes --Toprol XL 25 mg  Aldosterone antagonist - Aldactone 12.5 mg daily  SCD prophylaxis yestoday  Fluid status mildly hypervolemic, continue bumex 2mg bid  NSAID use: contraindicated    Parox afib: On warfarin    Gout:  Not 100% better, prednisone 20 mg x 3 days and then taper.  Once completely resolved will all back allopurinol and colchicine.  If not resolving, will send to rheumatology for further evaluation.    Asthma/COPD: As above,  repeat PFTs in 3 months.  Steroids, CT chest and pulm consult if abnormal at that time.    Florentino Orellana MD  Cardiology Fellow    I have seen and examined the patient with the house staff on July 25, 2017 and agree with the outlined assessment and plan.      Delisa Montgomery MD   of Medicine   [facility] Division of Cardiology             CC    Chase Molina MD   [facility]

## 2017-07-25 NOTE — LETTER
"7/25/2017      RE: Jim Willingham  [address] [facility] [address]       Dear Colleague,    Thank you for the opportunity to participate in the care of your patient, Jim Willingham, at the [facility] HEART [facility] at [facility]. Please see a copy of my visit note below.    07/25/17      Dear Buddy,     I had the pleasure of seeing Jim Willingham in the [facility] on June 8, 2017. As you know he is a pleasant 62 year old male with a long standing history of non- ischemic cardiomyopathy who has been declining, particularly over the last year.     He had been medically managed in the setting of his NICM and was admitted for an expedited workup in May for LVAD as he was hypotensive with worsening cardiac function despite medical therapy.  He underwent LVAD placement on 6/19/2017, followed by rehabilitation without any major complications or adverse events.    Today, he comes to clinic with his wife and newly adopted granddaughter.  He is 5 weeks out from his LVAD and feels great.  He has more energy than he remembers and says \"I feel 15 years younger\".  He has had no interval hospital admissions. He did have a gout flare and is getting better, but not 100% yet.  He has been losing water weight and is down to 234 lbs according to his home scale.  Watching salt in his diet.  He is compliant with his medications but has forgot to take his aspirin, which he will restart.  No alarms or PI events.  No palpitations, syncope, chest pain, abdominal pain, stroke symptoms, syncope, loss of consciousness, orthopnea, PND or infectious symptoms.  He also states that his breathing is improved as well.    PAST MEDICAL HISTORY:  Past Medical History:   Diagnosis Date     Benign essential hypertension 5/11/2017     Cardiomyopathy, unspecified (H) 5/8/2017     CKD (chronic kidney disease) stage 3, GFR 30-59 ml/min 5/11/2017     Depression 5/11/2017     " Diabetes mellitus (H) 5/11/2017     H/O gastric bypass 5/11/2017     ICD (implantable cardioverter-defibrillator), biventricular, in situ 5/11/2017     NICM (nonischemic cardiomyopathy) (H)/ EF 20% 5/11/2017    ECHO: LVEDd. 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 They have several children (from separate partners) and they have just adopted a granddaughter who he is the primary care giver.     He stopped ETOH in  2010 and would drink socially before that but never had any life difficulties related to alcohol abuse.     He stopped smoking in 1994. No ilicits.     Family history: there is no family history of premature MI or SCD, or CHF      CURRENT MEDICATIONS:  Current Outpatient Prescriptions   Medication Sig Dispense Refill     metoprolol (TOPROL-XL) 25 MG 24 hr tablet Take 1 tablet (25 mg) by mouth 2 times daily 180 tablet 3     metFORMIN (GLUCOPHAGE) 500 MG tablet Take 0.5 tablets (250 mg) by mouth 2 times daily (with meals) 60 tablet 0     pantoprazole (PROTONIX) 40 MG EC tablet Take 1 tablet (40 mg) by mouth every morning 30 tablet 0     senna-docusate (SENOKOT-S;PERICOLACE) 8.6-50 MG per tablet Take 2 tablets by mouth 2 times daily as needed for constipation 100 tablet 0     traZODone (DESYREL) 50 MG tablet Take 50 mg by mouth nightly x one week then reduce to 25 mg (1/2 tab) by mouth nightly x one week then stop. 11 tablet 0     warfarin (COUMADIN) 3 MG tablet Use as directed. 30 tablet 0     traMADol (ULTRAM) 50 MG tablet Take 2 tablets (100 mg) by mouth every 6 hours as needed for moderate pain or breakthrough pain 28 tablet      acetaminophen (TYLENOL) 325 MG tablet  Take 2 tablets (650 mg) by mouth every 6 hours 100 tablet      bumetanide (BUMEX) 2 MG tablet Take 1 tablet (2 mg) by mouth daily       albuterol (ALBUTEROL) 108 (90 BASE) MCG/ACT Inhaler Inhale 2 puffs into the lungs every 4 hours as needed for shortness of breath / dyspnea or wheezing       ipratropium - albuterol 0.5 mg/2.5 mg/3 mL (DUONEB) 0.5-2.5 (3) MG/3ML neb solution Take 3 mLs by nebulization every 4 hours as needed for shortness of breath / dyspnea or wheezing       citalopram (CELEXA) 20 MG tablet Take 20 mg by mouth daily       cyanocobalamin (VITAMIN B12) 1000 MCG/ML injection Inject 1,000 mcg into the muscle every 30 days       fluticasone-vilanterol (BREO ELLIPTA) 200-25 MCG/INH oral inhaler Inhale 1 puff into the lungs daily       montelukast (SINGULAIR) 10 MG tablet Take 10 mg by mouth At Bedtime       umeclidinium (INCRUSE ELLIPTA) 62.5 MCG/INH oral inhaler Inhale 1 puff into the lungs daily       zinc sulfate (ZINCATE) 220 (50 ZN) MG capsule Take 220 mg by mouth 2 times daily       predniSONE (DELTASONE) 20 MG tablet Take 2 tablets (40 mg) by mouth daily       spironolactone (ALDACTONE) 25 MG tablet Take 0.5 tablets (12.5 mg) by mouth daily (Patient not taking: Reported on 7/25/2017) 45 tablet 3     aspirin 81 MG chewable tablet 1 tablet (81 mg) by Oral or Feeding Tube route daily (Patient not taking: Reported on 7/25/2017) 36 tablet      HYDROmorphone (DILAUDID) 2 MG tablet Take 1 tablet (2 mg) by mouth every 4 hours as needed for moderate to severe pain (Patient not taking: Reported on 7/25/2017) 45 tablet 0     polyethylene glycol (MIRALAX/GLYCOLAX) Packet Take 17 g by mouth daily as needed for constipation (for no bm in 1 day) (Patient not taking: Reported on 7/25/2017) 7 packet        ROS:   Constitutional: No fever, chills, or sweats. Weight is stable at present  ENT: No visual disturbance, ear ache, epistaxis, sore throat.   Allergies/Immunologic: Negative.   Respiratory: No cough,  "hemoptysis.   Cardiovascular: As per HPI.   GI: No nausea, vomiting, hematemesis, melena, or hematochezia.   : No urinary frequency, dysuria, or hematuria.   Integument: Negative.   Psychiatric: worried about the state of his health    Neuro: Negative.   Endocrinology: Negative.   Musculoskeletal: Negative.    EXAM:  BP (!) 82/0 (BP Location: Left arm, Patient Position: Chair, Cuff Size: Adult Regular)  Pulse 83  Ht 1.727 m (5' 8\")  Wt 110.9 kg (244 lb 9.6 oz)  SpO2 98%  BMI 37.19 kg/m2  General: appears comfortable, alert and articulate, obese   Head: normocephalic, atraumatic  Eyes: anicteric sclera, EOMI  Neck: no adenopathy  Orophyarynx: moist mucosa, no lesions, dentition intact  Heart: LVAD hum heart throughout precordium, JVP appears at the level of the clavicle, trace to 1+ LE edema  Abdomen: soft, non-tender, bowel sounds present, no hepatosplenomegaly   Neurological: normal speech and affect, no gross motor deficits      Last coronary angiogram and RHC May 10, 2017       Pressures      Phase:Baseline      AO :  70.00 mmHg / 49.00 mmHg ( 57.00 mmHg )  @ 10:39:59 AM            72.00 mmHg / 49.00 mmHg ( 58.00 mmHg )  @ 10:40:10 AM      LV :  83.00 mmHg / 16.00 mmHg / 23.00 mmHg  @ 10:46:50 AM      LV Pull back :  85.00 mmHg / 15.00 mmHg / 23.00 mmHg  @ 10:46:59 AM      AO Pull back :  86.00 mmHg / 51.00 mmHg ( 74.00 mmHg )  @ 10:47:06 AM      RA :  a wave = v wave = mean = 15.00 mmHg  @ 11:06:43 AM      RV :  44.00 mmHg / 15.00 mmHg / 18.00 mmHg  @ 11:06:27 AM      PA :  41.00 mmHg / 20.00 mmHg ( 32.00 mmHg )  @ 10:59:05 AM      PCW :  a wave = v wave = mean = 32.00 mmHg  @ 11:00:00 AM    Valves      Phase:Baseline      AV :  0.00 mmHg  @ 10:22:09 AM      AV Mean Gradient:  13.70 mmHg  @ 10:22:09 AM      AV Valve Area:  1.27 cm2  @ 10:22:09 AM      Cardiac Output      Phase:Baseline      Thermo Cardiac Output:  4.77 l/min  @ 10:22:09 AM    Flow      Phase:Baseline      Qp :  4.77 l/min  @ 10:22:09 " AM      Qs :  4.77 l/min  @ 10:22:09 AM         Status       Last echocardiogram:    Severely increased left ventricular size. LVEDd is 7.66 cm   2. LV function is severely reduced. The visually estimated ejection fraction is 20%.   3. Septal motion consistent with conduction delay.   4. Restrictive pattern of diastolic filling consistent with severe diastolic dysfunction.   5. Severely dilated left atrium.   6. Severe mitral valve regurgitation.   7. Moderate pulmonary hypertension. PAP 48 mmHG plus RAP, or approximately 55-58 mmHG.   8. Compared to prior study (1/26/2016); The EF remains severely depressed at 20%. Mitral regurgitation has increased from moderate to severe, likely due to the patient's cardiomyopathy. PAP has increased from 22 mmHG plus RAP to 48 mmHG feliz RAP.   9. The rhythm is normal sinus.  ualized. Trace pulmonic valve regurgitation.    LVIDd (2D):     7.66 cm (3.4-5.7) LA Major diam,s, A2c 7.5 cm      Pulmonary function tests:     The Patient reportedly appeared to give maximal effort.  The PFT   testing was performed on calibrated equipment and recorded in   compliance with the ATS/ERS Task Force Standardization of Lung   Function Testing. The PFT testing was performed in the sitting   position.    After acceptable spirograms had been obtained, the following   values were obtained and/or calculated:    Pre bronchodilator Spirometry:   SVC 2.35 L (61 %)  DLCO unc 16.89 ml/min/mmHg (59 %)  DLCOcor 17.57 ml/min/mmHg (62 %)  HGB 13.3 gm/dL    INTERPRETATION:    Slow vital capacity (SVC) is moderately reduced.    The DLCO is corrected for hemoglobin and is moderately reduced.    An isolated reduction in DLCO with normal lung volumes is seen in   patients with anemia, chronic pulmonary embolism, primary   pulmonary hypertension, carboxyhemoglobinemia, vasculitis or   early interstitial lung disease.      ECG: (per north) V pacing rate 90     Labs:    Lab Results   Component Value Date    WBC 7.6  07/25/2017    HGB 10.5 (L) 07/25/2017    HCT 34.4 (L) 07/25/2017     07/25/2017     07/25/2017    POTASSIUM 3.9 07/25/2017    CHLORIDE 99 07/25/2017    CO2 30 07/25/2017    BUN 24 07/25/2017    CR 1.16 07/25/2017     (H) 07/25/2017    SED 25 (H) 05/26/2017    NTBNPI 4216 (H) 06/15/2017    NTBNP 2502 (H) 05/12/2017    AST 24 07/25/2017    ALT 36 07/25/2017    ALKPHOS 89 07/25/2017    BILITOTAL 0.6 07/25/2017    INR 2.22 (H) 07/25/2017       Assessment and Plan:   In summary this is a very pleasant 62 M with a history of NICM who is now s/p LVAD on 6/19/2017.  He appears to be doing well today from a volume standpoint.  His energy level is improved, he is losing weight, his renal function is now normal.    With regards to his NICM, his LVAD appears to be functioning as anticipated.  There are no PI events.  Warfarin is at goal.  He will start his daily ASA 81 mg.  He is on Toprol-XL 25 mg daily, Bumex 2 mg daily, spironolactone 12.5 mg daily.  Today, given he has had significant improvement in his renal function, we will add losartan 25 mg daily to further medically optimize.  He will have a repeat echocardiogram in 2 weeks.  He will f/u with the [facility] in a few weeks as well with a chem panel and to consider turning up the speed of his LVAD at that time.  He will continue to work on weight loss.  We discussed limiting carolic intake and the goal of BMI < 35 for transplant consideration.    With regards to his pulmonary function, he is off the amiodarone.  He subjectively feels that his breathing is better but will repeat his PFTs in 3 months and if abnormal at that time, consider CT scan, steroids and pulmonary referral.      1. Chronic systolic heart failure secondary NICM EF 20%  Stage D  NYHA Class II  ACEi/ARB yes - Start losartan 25 mg daily  BB yes --Toprol XL 25 mg  Aldosterone antagonist - Aldactone 12.5 mg daily  SCD prophylaxis yestoday  Fluid status mildly hypervolemic, continue bumex 2mg  bid  NSAID use: contraindicated    Parox afib: On warfarin    Gout:  Not 100% better, prednisone 20 mg x 3 days and then taper.  Once completely resolved will all back allopurinol and colchicine.  If not resolving, will send to rheumatology for further evaluation.    Asthma/COPD: As above, repeat PFTs in 3 months.  Steroids, CT chest and pulm consult if abnormal at that time.    Florentino Orellana MD  Cardiology Fellow    I have seen and examined the patient with the house staff on July 25, 2017 and agree with the outlined assessment and plan.      Delisa Montgomery MD   of Medicine   [facility] Division of Cardiology         CC  Chase Molina MD   [facility]

## 2017-07-25 NOTE — PROGRESS NOTES
ANTICOAGULATION FOLLOW-UP CLINIC VISIT    Patient Name:  Jim Willingham  Date:  7/25/2017  Contact Type:  Telephone    SUBJECTIVE:        OBJECTIVE    INR   Date Value Ref Range Status   07/25/2017 2.22 (H) 0.86 - 1.14 Final       ASSESSMENT / PLAN  INR assessment THER    Recheck INR In: 1 WEEK    INR Location Clinic      Anticoagulation Summary as of 7/25/2017     INR goal 2.0-3.0   Today's INR 2.22   Maintenance plan No maintenance plan   Full instructions 7/25: 4.5 mg; 7/26: 4.5 mg; 7/27: 4.5 mg; 7/28: 4.5 mg; 7/29: 4.5 mg; 7/30: 4.5 mg; 7/31: 4.5 mg; Otherwise No maintenance plan   Next INR check 8/1/2017   Target end date     Indications   LVAD (left ventricular assist device) present (H) [Z95.811]  Long-term (current) use of anticoagulants [Z79.01] [Z79.01]         Anticoagulation Episode Summary     INR check location     Preferred lab     Send INR reminders to [facility]    Comments HIPPA Forms mailed 6/26/17  [facility] [phone]      Anticoagulation Care Providers     Provider Role Specialty Phone number    Delisa Montgomery MD Responsible Cardiology [phone]            See the Encounter Report to view Anticoagulation Flowsheet and Dosing Calendar (Go to Encounters tab in chart review, and find the Anticoagulation Therapy Visit)    Left message for patient with results and dosing recommendations. Asked patient to call back to report any missed doses, falls, signs and symptoms of bleeding or clotting, any changes in health, medication, or diet. Asked patient to call back with any questions or concerns. And HANANE for [facility] nurse.    Delisa Hillman RN

## 2017-07-25 NOTE — NURSING NOTE
1). PUMP DATA  Primary controller serial number: [id number]    HM II:   Flow: 6.3 L/min,    Speed: 9200 RPMs,     PI: 6.2 ,  Power: 6 Manuel,      Primary controller   Back up battery: Patient use: 8, Replace in: 29  Months     Data downloaded: No   Equipment and driveline assessed for damage: Yes     Back up : Serial number: [id number]  Back up battery: Patient use: 4 Replace in: 31  Months  Programmed settings identical to the settings on the primary controller :Yes      Education complete: Yes   Charge the BACKUP controller s backup battery every 6 months  Perform a self test on BACKUP every 6 months  Change the MPU s batteries every 6 months:Yes  Have equipment serviced yearly (if applicable):Yes    2). ALARMS  Alarms reported by patient since last pump evaluation: No  Alarms or other finding noted during pump data history and alarm download: No    Action Taken:  Reviewed data with patient: Yes      3). DRESSING CHANGE / DRIVELINE ASSESSMENT  Dressing change completed today: Yes  Appearance of Driveline site: healing - suture removed today.     Driveline stabilization: Method: [employer]  [ Teaching reinforced on need for stabilization of Driveline. ]    Saw patient/caregiver in clinic to check in approx 2 weeks post discharge. Pt reports VAD parameters WNL and weight stable. Reviewed medications and answered any questions. Patient reports sleeping well and no anxiety since being home with LVAD. Patient is able to move around the house and care for himself independently.     Discussed specific new problems/stressors since being discharged from the hospital: none at this time. Empathized with patient and reviewed coping strategies: enlisting support from friends and love ones, attending patient and caregiver support groups, reviewing LVAD educational materials to reinforce knowledge, and talking about concerns with family/care providers/trusted others. Encouraged pt to page VAD Coordinator with any  issues or questions. Pt verbalizes understanding.

## 2017-07-25 NOTE — PATIENT INSTRUCTIONS
Medications:  1. START taking Cozaar 25mg daily  Follow-up:  1. 8/11with Dr. Montgomery. You will get an echocardiogram at this visit  Instructions:  1. For your gout flare - take prednisone 20mg daily for 3 days, then 10mg daily for 3 days then 5mg daily for 3 days. If your pain is completely resolved, you can re-start Allopurinol and Colchicine. If the pain does not resolve, call the VAD coordinator for further instructions.     Great to see you in clinic today. Let us know if you need anything anytime via our pager at [phone] option #4 and ask to speak to the VAD coordinator on call.

## 2017-07-25 NOTE — MR AVS SNAPSHOT
Jim Willingham   7/25/2017   Anticoagulation Therapy Visit    Description:  62 year old male   Provider:  Delisa Hillman, RN   Department:  [facility]           INR as of 7/25/2017     Today's INR       Anticoagulation Summary as of 7/25/2017     INR goal 2.0-3.0   Today's INR    Next INR check     Indications   LVAD (left ventricular assist device) present (H) [Z95.811]  Long-term (current) use of anticoagulants [Z79.01] [Z79.01]         July 2017 Details    Sun Mon Tue Wed Thu Fri Sat           1                 2               3               4               5               6               7               8                 9               10               11               12               13               14               15                 16               17               18               19               20               21      4.5 mg   See details      22      4.5 mg           23      4.5 mg         24      3 mg         25            26               27               28               29                 30               31                     Date Details   07/21 This INR check       Date of next INR:  7/25/2017         How to take your warfarin dose     To take:  3 mg Take 1 of the 3 mg tablets.    To take:  4.5 mg Take 1.5 of the 3 mg tablets.

## 2017-07-25 NOTE — PROGRESS NOTES
ANTICOAGULATION FOLLOW-UP CLINIC VISIT    Patient Name:  Jim Willingham  Date:  7/25/2017  Contact Type:  Telephone    SUBJECTIVE:        OBJECTIVE    INR   Date Value Ref Range Status   07/25/2017 2.22 (H) 0.86 - 1.14 Final       ASSESSMENT / PLAN  No question data found.  Anticoagulation Summary as of 7/25/2017     INR goal 2.0-3.0   Today's INR    Next INR check    Target end date     Indications   LVAD (left ventricular assist device) present (H) [Z95.811]  Long-term (current) use of anticoagulants [Z79.01] [Z79.01]         Anticoagulation Episode Summary     INR check location     Preferred lab     Send INR reminders to [facility]    Comments HIPPA Forms mailed 6/26/17  [facility] [phone]      Anticoagulation Care Providers     Provider Role Specialty Phone number    Delisa Motngomery MD Responsible Cardiology [phone]            See the Encounter Report to view Anticoagulation Flowsheet and Dosing Calendar (Go to Encounters tab in chart review, and find the Anticoagulation Therapy Visit)  Left message for patient with results and dosing recommendations. Asked patient to call back to report any missed doses, falls, signs and symptoms of bleeding or clotting, any changes in health, medication, or diet. Asked patient to call back with any questions or concerns. Called Mar[facility] nurse with this information as well.    Delisa Hillman RN

## 2017-07-27 DIAGNOSIS — I50.22 CHRONIC SYSTOLIC CONGESTIVE HEART FAILURE (H): ICD-10-CM

## 2017-07-27 DIAGNOSIS — Z95.811 LVAD (LEFT VENTRICULAR ASSIST DEVICE) PRESENT (H): Primary | ICD-10-CM

## 2017-07-28 ENCOUNTER — TELEPHONE (OUTPATIENT)
Dept: CARDIOLOGY | Facility: CLINIC | Age: 62
End: 2017-07-28

## 2017-07-28 ENCOUNTER — CARE COORDINATION (OUTPATIENT)
Dept: CARDIOLOGY | Facility: CLINIC | Age: 62
End: 2017-07-28

## 2017-07-28 NOTE — PROGRESS NOTES
Called patient/caregiver to check in 3 weeks post discharge. Pt reports VAD parameters WNL and weight stable. Reviewed medications and answered any questions. Patient reports sleeping well and no anxiety since being home with LVAD. Patient is able to move around the house and care for himself independently.     Discussed specific new problems/stressors since being discharged from the hospital: none at this time. Empathized with patient and reviewed coping strategies: enlisting support from friends and love ones, attending patient and caregiver support groups, reviewing LVAD educational materials to reinforce knowledge, and talking about concerns with family/care providers/trusted others. Encouraged pt to page VAD Coordinator with any issues or questions. Pt verbalizes understanding.

## 2017-07-31 ENCOUNTER — DOCUMENTATION ONLY (OUTPATIENT)
Dept: CARDIOLOGY | Facility: CLINIC | Age: 62
End: 2017-07-31

## 2017-07-31 LAB — INR PPP: 1.8

## 2017-08-01 ENCOUNTER — ANTICOAGULATION THERAPY VISIT (OUTPATIENT)
Dept: ANTICOAGULATION | Facility: CLINIC | Age: 62
End: 2017-08-01

## 2017-08-01 DIAGNOSIS — Z95.811 LVAD (LEFT VENTRICULAR ASSIST DEVICE) PRESENT (H): ICD-10-CM

## 2017-08-01 DIAGNOSIS — Z79.01 LONG-TERM (CURRENT) USE OF ANTICOAGULANTS: ICD-10-CM

## 2017-08-01 NOTE — PROGRESS NOTES
ANTICOAGULATION FOLLOW-UP CLINIC VISIT    Patient Name:  Jim Willingham  Date:  8/1/2017  Contact Type:  Telephone    SUBJECTIVE:     Patient Findings     Positives Unexplained INR or factor level change    Comments Instructed pt to take ASA 325mg Daily per LVAD Protocol. Pt reports that he has been taking 3mg M and 4.5mg ROW will put this on the calendar to reflect this. Pt is going to start using [facility] lab to get INR's per clinic will see these sooner. Pt has a standing order that was placed on 6/26/17.            OBJECTIVE    INR   Date Value Ref Range Status   07/31/2017 1.80  Final     Comment:     Outside Lab        ASSESSMENT / PLAN  INR assessment SUB    Recheck INR In: 2 DAYS    INR Location Outside lab      Anticoagulation Summary as of 8/1/2017     INR goal 2.0-3.0   Today's INR 1.80! (7/31/2017)   Maintenance plan No maintenance plan   Full instructions 8/1: 6 mg; 8/2: 4.5 mg; Otherwise No maintenance plan   Next INR check 8/3/2017   Priority INR   Target end date     Indications   LVAD (left ventricular assist device) present (H) [Z95.811]  Long-term (current) use of anticoagulants [Z79.01] [Z79.01]         Anticoagulation Episode Summary     INR check location     Preferred lab     Send INR reminders to [facility]    Comments HIPPA Forms mailed 6/26/17  Labs drawn either at [facility] or [facility]      Anticoagulation Care Providers     Provider Role Specialty Phone number    UlisesDelisa travis MD Responsible Cardiology [phone]            See the Encounter Report to view Anticoagulation Flowsheet and Dosing Calendar (Go to Encounters tab in chart review, and find the Anticoagulation Therapy Visit)    Spoke with patient. Gave them their lab results and new warfarin recommendation.  No changes in health, medication, or diet. No missed doses, no falls. No signs or symptoms of bleed or clotting.     Radha Pedro RN

## 2017-08-01 NOTE — MR AVS SNAPSHOT
Jim Willingham   8/1/2017   Anticoagulation Therapy Visit    Description:  62 year old male   Provider:  Radha Pedro, RN   Department:  [facility]           INR as of 8/1/2017     Today's INR 1.80! (7/31/2017)      Anticoagulation Summary as of 8/1/2017     INR goal 2.0-3.0   Today's INR 1.80! (7/31/2017)   Full instructions 8/1: 6 mg; 8/2: 4.5 mg; Otherwise No maintenance plan   Next INR check 8/3/2017    Indications   LVAD (left ventricular assist device) present (H) [Z95.811]  Long-term (current) use of anticoagulants [Z79.01] [Z79.01]         August 2017 Details    Sun Mon Tue Wed Thu Fri Sat       1      6 mg   See details      2      4.5 mg         3            4               5                 6               7               8               9               10               11               12                 13               14               15               16               17               18               19                 20               21               22               23               24               25               26                 27               28               29               30               31                  Date Details   08/01 This INR check       Date of next INR:  8/3/2017         How to take your warfarin dose     To take:  4.5 mg Take 1.5 of the 3 mg tablets.    To take:  6 mg Take 2 of the 3 mg tablets.

## 2017-08-02 LAB
DLCOCOR-%PRED-PRE: 74 %
DLCOCOR-PRE: 20.57 ML/MIN/MMHG
DLCOUNC-%PRED-PRE: 65 %
DLCOUNC-PRE: 18.21 ML/MIN/MMHG
DLCOUNC-PRED: 27.7 ML/MIN/MMHG
ERV-%PRED-PRE: 35 %
ERV-PRE: 0.2 L
ERV-PRED: 0.55 L
EXPTIME-PRE: 9.38 SEC
FEF2575-%PRED-POST: 14 %
FEF2575-%PRED-PRE: 15 %
FEF2575-POST: 0.39 L/SEC
FEF2575-PRE: 0.41 L/SEC
FEF2575-PRED: 2.72 L/SEC
FEFMAX-%PRED-PRE: 50 %
FEFMAX-PRE: 4.42 L/SEC
FEFMAX-PRED: 8.83 L/SEC
FEV1-%PRED-PRE: 33 %
FEV1-PRE: 1.06 L
FEV1FEV6-PRE: 54 %
FEV1FEV6-PRED: 79 %
FEV1FVC-PRE: 49 %
FEV1FVC-PRED: 79 %
FEV1SVC-PRE: 40 %
FEV1SVC-PRED: 68 %
FIFMAX-PRE: 5.71 L/SEC
FRCN2-%PRED-PRE: 82 %
FRCN2-PRE: 2.84 L
FRCN2-PRED: 3.43 L
FVC-%PRED-PRE: 53 %
FVC-PRE: 2.16 L
FVC-PRED: 4.02 L
IC-%PRED-PRE: 59 %
IC-PRE: 2.44 L
IC-PRED: 4.13 L
RVN2-%PRED-PRE: 122 %
RVN2-PRE: 2.64 L
RVN2-PRED: 2.15 L
TLCN2-%PRED-PRE: 79 %
TLCN2-PRE: 5.28 L
TLCN2-PRED: 6.64 L
VA-%PRED-PRE: 66 %
VA-PRE: 4.31 L
VC-%PRED-PRE: 56 %
VC-PRE: 2.64 L
VC-PRED: 4.68 L

## 2017-08-03 ENCOUNTER — ANTICOAGULATION THERAPY VISIT (OUTPATIENT)
Dept: ANTICOAGULATION | Facility: CLINIC | Age: 62
End: 2017-08-03

## 2017-08-03 DIAGNOSIS — Z95.811 LVAD (LEFT VENTRICULAR ASSIST DEVICE) PRESENT (H): ICD-10-CM

## 2017-08-03 DIAGNOSIS — Z79.01 LONG-TERM (CURRENT) USE OF ANTICOAGULANTS: ICD-10-CM

## 2017-08-03 LAB — INR PPP: 1.86 (ref 0.86–1.14)

## 2017-08-03 PROCEDURE — 85610 PROTHROMBIN TIME: CPT | Performed by: FAMILY MEDICINE

## 2017-08-03 PROCEDURE — 36415 COLL VENOUS BLD VENIPUNCTURE: CPT | Performed by: FAMILY MEDICINE

## 2017-08-03 NOTE — PROGRESS NOTES
ANTICOAGULATION FOLLOW-UP CLINIC VISIT    Patient Name:  Jim Willingham  Date:  8/3/2017  Contact Type:  Telephone    SUBJECTIVE:     Patient Findings     Comments Instructions were left to continue with ASA 325mg daily until INR therapeutic.           OBJECTIVE    INR   Date Value Ref Range Status   08/03/2017 1.86 (H) 0.86 - 1.14 Final       ASSESSMENT / PLAN  INR assessment SUB    Recheck INR In: 4 DAYS    INR Location Clinic      Anticoagulation Summary as of 8/3/2017     INR goal 2.0-3.0   Today's INR 1.86!   Maintenance plan No maintenance plan   Full instructions 8/3: 6 mg; 8/4: 6 mg; 8/5: 4.5 mg; 8/6: 4.5 mg; Otherwise No maintenance plan   Next INR check 8/7/2017   Priority INR   Target end date     Indications   LVAD (left ventricular assist device) present (H) [Z95.811]  Long-term (current) use of anticoagulants [Z79.01] [Z79.01]         Anticoagulation Episode Summary     INR check location     Preferred lab     Send INR reminders to [facility]    Comments HIPPA Forms mailed 6/26/17  Labs drawn either at [facility] or [facility]      Anticoagulation Care Providers     Provider Role Specialty Phone number    Delisa Montgomery MD Responsible Cardiology [phone]            See the Encounter Report to view Anticoagulation Flowsheet and Dosing Calendar (Go to Encounters tab in chart review, and find the Anticoagulation Therapy Visit)    Spoke with Tatyana Alonso RN

## 2017-08-03 NOTE — MR AVS SNAPSHOT
Jim Willingham   8/3/2017   Anticoagulation Therapy Visit    Description:  62 year old male   Provider:  Maru Alonso, RN   Department:  [facility]           INR as of 8/3/2017     Today's INR 1.86!      Anticoagulation Summary as of 8/3/2017     INR goal 2.0-3.0   Today's INR 1.86!   Full instructions 8/3: 6 mg; 8/4: 6 mg; 8/5: 4.5 mg; 8/6: 4.5 mg; Otherwise No maintenance plan   Next INR check 8/7/2017    Indications   LVAD (left ventricular assist device) present (H) [Z95.811]  Long-term (current) use of anticoagulants [Z79.01] [Z79.01]         August 2017 Details    Sun Mon Tue Wed Thu Fri Sat       1               2               3      6 mg   See details      4      6 mg         5      4.5 mg           6      4.5 mg         7            8               9               10               11               12                 13               14               15               16               17               18               19                 20               21               22               23               24               25               26                 27               28               29               30               31                  Date Details   08/03 This INR check       Date of next INR:  8/7/2017         How to take your warfarin dose     To take:  4.5 mg Take 1.5 of the 3 mg tablets.    To take:  6 mg Take 2 of the 3 mg tablets.

## 2017-08-04 ENCOUNTER — CARE COORDINATION (OUTPATIENT)
Dept: CARDIOLOGY | Facility: CLINIC | Age: 62
End: 2017-08-04

## 2017-08-04 NOTE — PROGRESS NOTES
Called patient/caregiver to check in 4 weeks post discharge. Pt reports VAD parameters WNL and weight stable. Reviewed medications and answered any questions. Patient reports sleeping well and no anxiety since being home with LVAD. Patient is able to move around the house and care for himself independently.     Discussed specific new problems/stressors since being discharged from the hospital: none at this time. Empathized with patient and reviewed coping strategies: enlisting support from friends and love ones, attending patient and caregiver support groups, reviewing LVAD educational materials to reinforce knowledge, and talking about concerns with family/care providers/trusted others. Encouraged pt to page VAD Coordinator with any issues or questions. Pt verbalizes understanding.

## 2017-08-07 ENCOUNTER — ANTICOAGULATION THERAPY VISIT (OUTPATIENT)
Dept: ANTICOAGULATION | Facility: CLINIC | Age: 62
End: 2017-08-07

## 2017-08-07 DIAGNOSIS — Z95.811 LVAD (LEFT VENTRICULAR ASSIST DEVICE) PRESENT (H): ICD-10-CM

## 2017-08-07 DIAGNOSIS — Z79.01 LONG-TERM (CURRENT) USE OF ANTICOAGULANTS: ICD-10-CM

## 2017-08-07 LAB — INR PPP: 2.15 (ref 0.86–1.14)

## 2017-08-07 PROCEDURE — 36415 COLL VENOUS BLD VENIPUNCTURE: CPT | Performed by: INTERNAL MEDICINE

## 2017-08-07 PROCEDURE — 85610 PROTHROMBIN TIME: CPT | Performed by: INTERNAL MEDICINE

## 2017-08-07 NOTE — PROGRESS NOTES
Cardiology Progress Note      Events and interval changes in past 24 hours:   Denies dyspnea, orthoponea, palpitations, presyncope or chest pain  8 beats NSVT last evening, rare PVCs        OBJECTIVE FINDINGS:  Temp: 98.8  F (37.1  C) Temp  Min: 97.8  F (36.6  C)  Max: 98.8  F (37.1  C)  Resp: 16 Resp  Min: 16  Max: 18  SpO2: 99 % SpO2  Min: 94 %  Max: 99 % 2.5L Bipap at night    No Data Recorded  Heart Rate: 84 Heart Rate  Min: 84  Max: 94  BP: 93/81 Systolic (24hrs), Av , Min:84 , Max:94      MAPs 80-90     HM2 VAD flow 5 PI 5.5 speed 9200 (increased from 9000 on ) power 5     Gen: alert  Resp: clear to auscultation bilaterally, no crackles or wheezing   CV: VAD hum, JVP 6cm, 1-2mm peripheral edema  Abd:soft, nontender,      1.4/1.3 UO yest  net +0.06  today /0.3urine  net -0.3  -1.3 net for admission     Wt after  lbs            Vitals:     17 0600 17 0501 17 2306 17 0500   Weight: 110.5 kg (243 lb 9.7 oz) 114 kg (251 lb 4.8 oz) 115.2 kg (253 lb 14.4 oz) 115.7 kg (255 lb)     17 0620   Weight: 114.9 kg (253 lb 3.2 oz)      Inhaled NO    Warfarin Therapy Reminder        Reason beta blocker order not selected        dextrose 5% and 0.45% NaCl 30 mL/hr at 17 0800        potassium chloride  20 mEq Oral BID     bumetanide  1 mg Oral BID     ipratropium - albuterol 0.5 mg/2.5 mg/3 mL  3 mL Nebulization 4x daily     metoprolol  25 mg Oral BID     polyethylene glycol  17 g Oral BID     bisacodyl  10 mg Rectal Daily     acetaminophen  650 mg Oral Q6H     citalopram  20 mg Oral Daily     cyanocobalamin  500 mcg Sublingual Q48H     montelukast  10 mg Oral At Bedtime     fluticasone-vilanterol  1 puff Inhalation Daily     pantoprazole  40 mg Oral QAM     insulin aspart  1-7 Units Subcutaneous TID AC     insulin aspart  1-5 Units Subcutaneous At Bedtime     aspirin  81 mg Oral or Feeding Tube Daily     traZODone  50 mg Oral At Bedtime     sodium chloride (PF)  3 mL  Intracatheter Q8H     senna-docusate  1-2 tablet Oral BID     pneumococcal vaccine  0.5 mL Intramuscular Prior to discharge   ipratropium - albuterol 0.5 mg/2.5 mg/3 mL, umeclidinium, traMADol, HYDROmorphone, cyclobenzaprine, HYDROmorphone, albuterol, glucose **OR** dextrose **OR** glucagon, Warfarin Therapy Reminder, naloxone, lidocaine, lidocaine 4%, sodium chloride (PF), Reason beta blocker order not selected, potassium chloride, potassium chloride, potassium chloride, potassium chloride with lidocaine, potassium chloride, magnesium sulfate, magnesium sulfate, potassium phosphate (KPHOS) in D5W IV, potassium phosphate (KPHOS) in D5W IV, potassium phosphate (KPHOS) in D5W IV, potassium phosphate (KPHOS) in D5W IV, potassium phosphate (KPHOS) in D5W IV, ondansetron **OR** ondansetron     Albumin 3.5  CMP     Recent Labs  Lab 06/27/17  0817 06/26/17  0606 06/25/17  0826 06/24/17  0736    135 136 135   POTASSIUM 4.3 4.0 4.1 4.2   CHLORIDE 101 103 104 104   CO2 25 24 24 24   ANIONGAP 8 8 8 7   * 118* 116* 113*   BUN 28 28 34* 36*   CR 1.39* 1.47* 1.62* 1.90*   GFRESTIMATED 52* 49* 44* 36*   GFRESTBLACK 63 59* 53* 44*   QAMAR 8.9 9.0 8.6 8.6   MAG 2.0 2.1 2.3 2.4*   PHOS 3.7 3.2 2.9 2.9      CBC     Recent Labs  Lab 06/27/17  0817 06/26/17  0606 06/25/17  0826 06/24/17  0736   WBC 9.4 9.4 9.6 8.4   RBC 3.12* 2.90* 2.83* 2.84*   HGB 8.9* 8.3* 8.1* 8.3*   HCT 28.3* 26.2* 25.8* 25.9*   MCV 91 90 91 91   MCH 28.5 28.6 28.6 29.2   MCHC 31.4* 31.7 31.4* 32.0   RDW 14.3 14.0 13.8 13.8    218 190 154      INR     Recent Labs  Lab 06/27/17  0817 06/26/17  0606 06/25/17  0826 06/24/17  1021   INR 3.29* 2.83* 2.41* 2.03*      Arterial Blood Gas     Recent Labs  Lab 06/23/17  0725 06/22/17  0415 06/21/17  1559 06/21/17  0900   PH  --  7.35 7.34* 7.39   PCO2  --  43 43 33*   PO2  --  84 114* 124*   HCO3  --  24 23 20*   O2PER 3L 3L  3L 4L 4L      Imaging and other studies:  EKG: Vpaced rhythm     Echocardiogram:  before LVAD 6/12  Severe left ventricular dilation is present. LVIDd 8.0 cm.  Severe diffuse hypokinesis is present. Posterior wall akinesis is present.  The Ejection Fraction is estimated at 20-25%.Traced Biplane LVEF is 23%.  Moderate-Severe functional Mitral Regurgitation. There is tenting of the mitral leaflets with restriction of the posterior mitral leaflet. ERO is 0.3  cm^2. RV 43 ml.  Mild right ventricular dilation is present. Global right ventricular function is normal.  Dilation of the inferior vena cava is present with normal respiratory variation in diameter.     Echo after LVAD 6/23  Interpretation Summary  HM 2 at 9000 RPM.     LVAD cannula was seen in the expected anatomic position in the LV apex. LV end  diastolic diameter is 5.4cm. The Ejection Fraction is estimated at <20%.  The aortic valve opens intermittently.  Septum normal.  The inferior vena cava was normal in size with preserved respiratory  Variability.  Mild RV dysfunction        Device settings DDDR  VT/VF ATP and defib, 188  monitor     ASSESSMENT:     62M h/o NICM (EF 20%) s/p Bi-V ICD, severe MR, VT, paroxsymal afib, DM, HTN, CKD, CECILIA, MDD, admitted for gradual shortness of breath, likely 2/2 to inadequate maintenance diuretics. Improving with BiPAP.      # NICM s/p CRT-D s/p HM2 LVAD as DT 6/20, s/p [id number]-[id number]  in May, extubated 6/21  # Mild RV function after LVAD, normal RV function after LVAD  # Severe functional MR-resolved after VAD  # SVT 6/23-6/24  # NSVT 6/25     # WALTER-unclear etiology-likely hypoperfusion from surgery-improving    # Gout-left elbow     # hx of VT  # pAF  #HTN  #CKD-basline Cr 1.4  #CECILIA     Recommendations  - Diuresis-Bumex 1mg BID  - Metoprolol 25 BID for NSVT/SVT  - Goal MAP<90.   - aspirin 81, warfarin INR therapuetic  -optimize LVAD speed: increased to 9000 on 6/20     Will staff with Dr. Ulises Gómez  General cards fellow, PGY4  Pager [phone]         I have seen and  examined the patient with the house staff on June 28, 2017  and agree with the outlined assessment and plan.      Delisa Montgomery MD   of Medicine   [facility] Division of Cardiology

## 2017-08-07 NOTE — OP NOTE
Revised      DATE OF SERVICE:  06/19/2017         PREOPERATIVE DIAGNOSES:    1.  Dilated cardiomyopathy.    2.  Cardiogenic shock, status post IV inotropes and balloon pump.    3.  Acute on chronic renal failure.    4.  Obesity.         POSTOPERATIVE DIAGNOSES:    1.  Dilated cardiomyopathy.    2.  Cardiogenic shock, status post IV inotropes and balloon pump.    3.  Acute on chronic renal failure.    4.  Obesity.         PROCEDURE:  Placement of HeartMate II left ventricular assist device (intracorporeal left ventricular assist device).         SURGEON:  Ronnie Quigley MD         ASSISTANT:  Corey Reddy MD         OPERATIVE INDICATIONS:  Jim Willingham is a 62-year-old gentleman with dilated cardiomyopathy who was admitted with cardiogenic shock requiring intraaortic balloon pump and inotropes.  A decision was made for HeartMate II LVAD placement as a bridge to transplantation and discussed risks and benefits of surgery with the patient and family including the risk of death, stroke, bleeding, wound infection, renal failure and arrhythmias.  He is willing to proceed with surgery.         OPERATIVE FINDINGS:  The patient's sternum was of adequate quality.  There was severe LV dysfunction.  There was mild-to-moderate RV dysfunction.  There was no thrombus in the apex of left ventricle.  There was no patent foramen ovale and only trace aortic incompetence.         DESCRIPTION OF OPERATION:  The patient was brought to operating room in stable condition.  Following the administration of general anesthesia, the patient's chest, abdomen and lower extremities prepped and draped in the usual sterile manner.  Median sternotomy was performed.  Preparation of cardiopulmonary bypass included ACT-guided heparinization and the admission of Amicar.  The aorta was cannulated with a 24-Armenian arterial cannula via 3-0 Tevdek pursestring pledgeted suture x2.  Dual stage venous cannula was inserted in the right atrium.  Full  cardiopulmonary bypass was initiated.  A preperitoneal pocket was made.  Coring knife was used to core a portion of the apex of left ventricle, 2-0 Tevdek mattress sutures were placed on the apical core.  These sutures were then passed through the inflow cuff, which was then seated and tied without difficulty.  Inflow cannula was inserted in the heart and secured adequately.  Outflow graft was measured and sewn to a longitudinal aortotomy using continuous 4-0 Prolene suture.  The outflow graft was connected to the pump.  De-airing maneuvers were performed in the usual standard fashion.  Following confirmation of de-airing by echocardiography, the patient gradually weaned off cardiopulmonary bypass and LVAD pump was initiated.  Initial flows were stable.  Echo showed mild to moderate LV decompression, mild-to-moderate RV dysfunction.  Protamine was given and all cannulas removed.  Careful hemostasis obtained.  Two anterior mediastinal and bilateral pleural chest tubes were placed.  Sternum was closed with surgical steel wires.  Fascia, subcutaneous tissue and skin of chest were closed in layers.  The patient transferred to ICU in stable but critical condition.      Revised encounter lg 17         DIYA BECKER MD             D: 2017 10:43   T: 2017 07:36   MT: HALEY      Name:     LOIS FULLER   MRN:      [id number]        Account:        [id number]   :      1955           Procedure Date: 2017      Document: [id number]       cc: [facility] Billing

## 2017-08-07 NOTE — MR AVS SNAPSHOT
Jim Willingham   8/7/2017   Anticoagulation Therapy Visit    Description:  62 year old male   Provider:  Kirsty Bermudez, RN   Department:  [facility]           INR as of 8/7/2017     Today's INR 2.15      Anticoagulation Summary as of 8/7/2017     INR goal 2.0-3.0   Today's INR 2.15   Full instructions 8/7: 6 mg; 8/8: 4.5 mg; 8/9: 6 mg; 8/10: 4.5 mg; Otherwise No maintenance plan   Next INR check 8/11/2017    Indications   LVAD (left ventricular assist device) present (H) [Z95.811]  Long-term (current) use of anticoagulants [Z79.01] [Z79.01]         August 2017 Details    Sun Mon Tue Wed Thu Fri Sat       1               2               3               4               5                 6               7      6 mg   See details      8      4.5 mg         9      6 mg         10      4.5 mg         11            12                 13               14               15               16               17               18               19                 20               21               22               23               24               25               26                 27               28               29               30               31                  Date Details   08/07 This INR check       Date of next INR:  8/11/2017         How to take your warfarin dose     To take:  4.5 mg Take 1.5 of the 3 mg tablets.    To take:  6 mg Take 2 of the 3 mg tablets.

## 2017-08-07 NOTE — PROGRESS NOTES
ANTICOAGULATION FOLLOW-UP CLINIC VISIT    Patient Name:  Jim Willingham  Date:  8/7/2017  Contact Type:  Telephone    SUBJECTIVE:        OBJECTIVE    INR   Date Value Ref Range Status   08/07/2017 2.15 (H) 0.86 - 1.14 Final       ASSESSMENT / PLAN  INR assessment THER    Recheck INR In: 4 DAYS    INR Location Clinic      Anticoagulation Summary as of 8/7/2017     INR goal 2.0-3.0   Today's INR 2.15   Maintenance plan No maintenance plan   Full instructions 8/7: 6 mg; 8/8: 4.5 mg; 8/9: 6 mg; 8/10: 4.5 mg; Otherwise No maintenance plan   Next INR check 8/11/2017   Priority INR   Target end date     Indications   LVAD (left ventricular assist device) present (H) [Z95.811]  Long-term (current) use of anticoagulants [Z79.01] [Z79.01]         Anticoagulation Episode Summary     INR check location     Preferred lab     Send INR reminders to [facility]    Comments HIPPA Forms mailed 6/26/17  Labs drawn either at [facility] or [facility]      Anticoagulation Care Providers     Provider Role Specialty Phone number    Delisa Montgomery MD [facility] Cardiology [phone]            See the Encounter Report to view Anticoagulation Flowsheet and Dosing Calendar (Go to Encounters tab in chart review, and find the Anticoagulation Therapy Visit)    Spoke with Jim.  He will go back to taking ASA 81 mg daily.  He reports no changes in health, diet, medications.  He has a lab appt on 8/11/17 and will recheck INR at that time.    Kirsty Bermudez RN

## 2017-08-08 ENCOUNTER — TELEPHONE (OUTPATIENT)
Dept: CARDIOLOGY | Facility: CLINIC | Age: 62
End: 2017-08-08

## 2017-08-08 DIAGNOSIS — M10.00 ACUTE IDIOPATHIC GOUT, UNSPECIFIED SITE: Primary | ICD-10-CM

## 2017-08-08 RX ORDER — ALLOPURINOL 100 MG/1
100 TABLET ORAL DAILY
Qty: 30 TABLET | Refills: 4 | Status: SHIPPED | OUTPATIENT
Start: 2017-08-08 | End: 2018-01-09

## 2017-08-11 ENCOUNTER — ANTICOAGULATION THERAPY VISIT (OUTPATIENT)
Dept: ANTICOAGULATION | Facility: CLINIC | Age: 62
End: 2017-08-11

## 2017-08-11 ENCOUNTER — HOSPITAL ENCOUNTER (OUTPATIENT)
Dept: CARDIOLOGY | Facility: CLINIC | Age: 62
Discharge: HOME | End: 2017-08-11
Attending: INTERNAL MEDICINE | Admitting: INTERNAL MEDICINE
Payer: COMMERCIAL

## 2017-08-11 ENCOUNTER — OFFICE VISIT (OUTPATIENT)
Dept: CARDIOLOGY | Facility: CLINIC | Age: 62
End: 2017-08-11
Attending: INTERNAL MEDICINE
Payer: COMMERCIAL

## 2017-08-11 VITALS
OXYGEN SATURATION: 99 % | HEIGHT: 68 IN | BODY MASS INDEX: 36.45 KG/M2 | WEIGHT: 240.5 LBS | HEART RATE: 82 BPM | TEMPERATURE: 98.1 F | SYSTOLIC BLOOD PRESSURE: 82 MMHG

## 2017-08-11 DIAGNOSIS — I10 BENIGN ESSENTIAL HYPERTENSION: ICD-10-CM

## 2017-08-11 DIAGNOSIS — Z95.811 LVAD (LEFT VENTRICULAR ASSIST DEVICE) PRESENT (H): Primary | ICD-10-CM

## 2017-08-11 DIAGNOSIS — N18.30 CKD (CHRONIC KIDNEY DISEASE) STAGE 3, GFR 30-59 ML/MIN (H): ICD-10-CM

## 2017-08-11 DIAGNOSIS — Z95.811 LVAD (LEFT VENTRICULAR ASSIST DEVICE) PRESENT (H): ICD-10-CM

## 2017-08-11 DIAGNOSIS — Z79.01 LONG-TERM (CURRENT) USE OF ANTICOAGULANTS: ICD-10-CM

## 2017-08-11 DIAGNOSIS — I42.8 NICM (NONISCHEMIC CARDIOMYOPATHY) (H): ICD-10-CM

## 2017-08-11 DIAGNOSIS — I48.0 PAROXYSMAL ATRIAL FIBRILLATION (H): ICD-10-CM

## 2017-08-11 DIAGNOSIS — I50.22 CHRONIC SYSTOLIC CONGESTIVE HEART FAILURE (H): ICD-10-CM

## 2017-08-11 PROCEDURE — 99214 OFFICE O/P EST MOD 30 MIN: CPT | Mod: 25 | Performed by: NURSE PRACTITIONER

## 2017-08-11 PROCEDURE — 93750 INTERROGATION VAD IN PERSON: CPT | Mod: ZF | Performed by: NURSE PRACTITIONER

## 2017-08-11 PROCEDURE — 99212 OFFICE O/P EST SF 10 MIN: CPT | Mod: ZF

## 2017-08-11 PROCEDURE — 93306 TTE W/DOPPLER COMPLETE: CPT | Mod: 26 | Performed by: INTERNAL MEDICINE

## 2017-08-11 PROCEDURE — 99215 OFFICE O/P EST HI 40 MIN: CPT | Mod: 25,ZF

## 2017-08-11 PROCEDURE — 93306 TTE W/DOPPLER COMPLETE: CPT

## 2017-08-11 RX ORDER — SPIRONOLACTONE 25 MG/1
12.5 TABLET ORAL DAILY
Qty: 30 TABLET | Refills: 5 | Status: SHIPPED | OUTPATIENT
Start: 2017-08-11 | End: 2017-08-18

## 2017-08-11 ASSESSMENT — PAIN SCALES - GENERAL: PAINLEVEL: NO PAIN (0)

## 2017-08-11 NOTE — MR AVS SNAPSHOT
After Visit Summary   8/11/2017    Jim Willingham    MRN: [id number]           Patient Information     Date Of Birth          1955        Visit Information        Provider Department      8/11/2017 11:00 AM Cate Marshall NP [facility]        Today's Diagnoses     LVAD (left ventricular assist device) present (H)    -  1      Care Instructions    Medications:  1. No changes today  Follow-up:  1. 8/21/17 with Dr. Quigley  2. 9/15/17 with Jeanine Obando  3. 12/15 with Dr. Montgomery  Instructions:  1. Get another echo on 9/15. This can be done at the clinic    Great to see you in clinic today. Let us know if you need anything anytime via our pager at [phone] option #4 and ask to speak to the VAD coordinator on call.               Follow-ups after your visit        Your next 10 appointments already scheduled     Aug 21, 2017  2:00 PM CDT   Lab with [employer] LAB    Health Lab ([facility])    [address] [place] [id number] [phone]            Aug 21, 2017  2:30 PM CDT   (Arrive by 2:15 PM)   Return Visit with Ronnie Quigley MD   [facility] ([facility])    [address] [place] [id number] [phone]            Sep 15, 2017  9:00 AM CDT   Lab with [employer] LAB    Health Lab ([facility])    [address] [place] [address] [id number] [phone]            Sep 15, 2017  9:30 AM CDT   (Arrive by 9:15 AM)   Implanted Defibulator with Uc Cv Device 1   [facility] ([facility])    [address] [place] [id number] [phone]            Sep 15, 2017 10:00 AM CDT   (Arrive by 9:45 AM)   Ventricular Assist Device with Jeanine Wilson NP   [facility] ([facility])    [address] [place] [phone] [phone]            Sep  15, 2017 11:00 AM CDT   FULL PULMONARY FUNCTION with  PFL C   [facility] Pulmonary Function Testing ([facility])    [address] [address]   [phone]            Sep 15, 2017  1:30 PM CDT   Ech Complete Lvad* with UCECHCR1   [facility] Echo ([facility])    [address] [id number] [phone]           1.  Please bring or wear a comfortable two-piece outfit. 2.  You may eat, drink and take your normal medicines. 3.  For any questions that cannot be answered, please contact the ordering physician            Dec 15, 2017  8:00 AM CST   Lab with  LAB   [facility] Lab ([facility])    [address] [place] [id number] [phone]            Dec 15, 2017  8:30 AM CST   (Arrive by 8:15 AM)   Implanted Defibulator with  Cv Device 1   [facility] Care ([facility])    [address] [place] [id number] [phone]            Dec 15, 2017  9:00 AM CST   (Arrive by 8:45 AM)   Ventricular Assist Device with Delisa Montgomery MD   [facility] ([facility])    [address] [place] [id number] [phone]              Future tests that were ordered for you today     Open Future Orders        Priority Expected Expires Ordered    Echo LVAD Complete * Routine  8/11/2018 8/11/2017            Who to contact     If you have questions or need follow up information about today's clinic visit or your schedule please contact [facility] directly at [phone].  Normal or non-critical lab and imaging results will be communicated to you by MyChart, letter or phone within 4 business days after the clinic has received the results. If you do not hear from us within 7 days, please contact the clinic through MyChart or phone. If you have a  "critical or abnormal lab result, we will notify you by phone as soon as possible.  Submit refill requests through [employer] or call your pharmacy and they will forward the refill request to us. Please allow 3 business days for your refill to be completed.          Additional Information About Your Visit        [employer] Information     [employer]utor lets you send messages to your doctor, view your test results, renew your prescriptions, schedule appointments and more. To sign up, go to [email or web address] . Click on \"Log in\" on the left side of the screen, which will take you to the Welcome page. Then click on \"Sign up Now\" on the right side of the page.     You will be asked to enter the access code listed below, as well as some personal information. Please follow the directions to create your username and password.     Your access code is: [email or web address]  Expires: 2017 11:18 AM     Your access code will  in 90 days. If you need help or a new code, please call your [place] clinic or [phone].        Care EveryWhere ID     This is your Care EveryWhere ID. This could be used by other organizations to access your [place] medical records  [id number]        Your Vitals Were     Pulse Temperature Height Pulse Oximetry BMI (Body Mass Index)       82 98.1  F (36.7  C) (Oral) 1.727 m (5' 8\") 99% 36.57 kg/m2        Blood Pressure from Last 3 Encounters:   17 (!) 82/0   17 (!) 82/0   17 (!) 72/0    Weight from Last 3 Encounters:   17 109.1 kg (240 lb 8 oz)   17 110.9 kg (244 lb 9.6 oz)   17 110.6 kg (243 lb 12.8 oz)              We Performed the Following     (15912) INTERROGATE VENT ASSIST DEVICE, IN PERSON, DAGO JETER ANALYSIS PARAMETERS        Primary Care Provider Office Phone # Fax #    Jovany Salas [phone]       [address] [address]        Equal Access to Services     JERROD VELASQUEZ AH: Rajeev Gastelum, " waaxda ashishadaha, qaybta kaalmada neo, kendra contejen ah. So [facility] [place] [phone].    ATENCIÓN: Si yrn garcia, tiene a roger disposición servicios gratuitos de asistencia lingüística. Guerline al [phone].    We comply with applicable federal civil rights laws and [place] laws. We do not discriminate on the basis of race, color, national origin, age, disability sex, sexual orientation or gender identity.            Thank you!     Thank you for choosing [facility]  for your care. Our goal is always to provide you with excellent care. Hearing back from our patients is one way we can continue to improve our services. Please take a few minutes to complete the written survey that you may receive in the mail after your visit with us. Thank you!             Your Updated Medication List - Protect others around you: Learn how to safely use, store and throw away your medicines at www.disposemymeds.org.          This list is accurate as of: 8/11/17 11:22 AM.  Always use your most recent med list.                   Brand Name Dispense Instructions for use Diagnosis    acetaminophen 325 MG tablet    TYLENOL    100 tablet    Take 2 tablets (650 mg) by mouth every 6 hours    Acute post-operative pain       albuterol 108 (90 BASE) MCG/ACT Inhaler   Generic drug:  albuterol      Inhale 2 puffs into the lungs every 4 hours as needed for shortness of breath / dyspnea or wheezing        allopurinol 100 MG tablet    ZYLOPRIM    30 tablet    Take 1 tablet (100 mg) by mouth daily    Acute idiopathic gout, unspecified site       aspirin 81 MG chewable tablet     36 tablet    1 tablet (81 mg) by Oral or Feeding Tube route daily    LVAD (left ventricular assist device) present (H)       BREO ELLIPTA 200-25 MCG/INH oral inhaler   Generic drug:  fluticasone-vilanterol      Inhale 1 puff into the lungs daily        bumetanide 2 MG tablet    BUMEX    60 tablet    Take 1 tablet (2 mg) by mouth daily    LVAD  (left ventricular assist device) present (H)       celeXA 20 MG tablet   Generic drug:  citalopram      Take 20 mg by mouth daily        COLCHICINE PO      Take by mouth daily        cyanocobalamin 1000 MCG/ML injection    VITAMIN B12     Inject 1,000 mcg into the muscle every 30 days        HYDROmorphone 2 MG tablet    DILAUDID    45 tablet    Take 1 tablet (2 mg) by mouth every 4 hours as needed for moderate to severe pain    Acute post-operative pain       INCRUSE ELLIPTA 62.5 MCG/INH oral inhaler   Generic drug:  umeclidinium      Inhale 1 puff into the lungs daily        ipratropium - albuterol 0.5 mg/2.5 mg/3 mL 0.5-2.5 (3) MG/3ML neb solution    DUONEB     Take 3 mLs by nebulization every 4 hours as needed for shortness of breath / dyspnea or wheezing        losartan 25 MG tablet    COZAAR    60 tablet    Take 1 tablet (25 mg) by mouth daily    LVAD (left ventricular assist device) present (H), Chronic systolic congestive heart failure (H)       metFORMIN 500 MG tablet    GLUCOPHAGE    60 tablet    Take 0.5 tablets (250 mg) by mouth 2 times daily (with meals)    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, without long-term current use of insulin, unspecified CKD stage (H)       metoprolol 25 MG 24 hr tablet    TOPROL-XL    180 tablet    Take 1 tablet (25 mg) by mouth 2 times daily    Chronic systolic congestive heart failure (H), LVAD (left ventricular assist device) present (H)       pantoprazole 40 MG EC tablet    PROTONIX    30 tablet    Take 1 tablet (40 mg) by mouth every morning    Acute post-operative pain       polyethylene glycol Packet    MIRALAX/GLYCOLAX    7 packet    Take 17 g by mouth daily as needed for constipation (for no bm in 1 day)    Acute post-operative pain       predniSONE 20 MG tablet    DELTASONE    20 tablet    Take 1 tablet (20 mg) by mouth daily    Acute idiopathic gout, unspecified site       senna-docusate 8.6-50 MG per tablet     SENOKOT-S;PERICOLACE    100 tablet    Take 2 tablets by mouth 2 times daily as needed for constipation    Acute post-operative pain       SINGULAIR 10 MG tablet   Generic drug:  montelukast      Take 10 mg by mouth At Bedtime        traMADol 50 MG tablet    ULTRAM    28 tablet    Take 2 tablets (100 mg) by mouth every 6 hours as needed for moderate pain or breakthrough pain    Acute post-operative pain       traZODone 50 MG tablet    DESYREL    11 tablet    Take 50 mg by mouth nightly x one week then reduce to 25 mg (1/2 tab) by mouth nightly x one week then stop.    Acute post-operative pain, LVAD (left ventricular assist device) present (H)       warfarin 3 MG tablet    COUMADIN    30 tablet    Use as directed.    LVAD (left ventricular assist device) present (H)       zinc sulfate 220 (50 ZN) MG capsule    ZINCATE     Take 220 mg by mouth 2 times daily

## 2017-08-11 NOTE — PATIENT INSTRUCTIONS
Medications:  1. No changes today  Follow-up:  1. 8/21/17 with Dr. Quigley  2. 9/15/17 with Jeanine Obando  3. 12/15 with Dr. Montgomery  Instructions:  1. Get another echo on 9/15. This can be done at the clinic    Great to see you in clinic today. Let us know if you need anything anytime via our pager at [phone] option #4 and ask to speak to the VAD coordinator on call.

## 2017-08-11 NOTE — NURSING NOTE
1). PUMP DATA  Primary controller serial number: [id number]    HM II:   Flow: 5.6 L/min,    Speed: 9200 RPMs,     PI: 6.1 ,  Power: 5.8 Manuel,    SPEED CHANGE: 9600 rpm's, Flow: 6, PI: 6.3, Power: 6.4 Manuel    Primary controller   Back up battery: Patient use: 8, Replace in: 28  Months     Data downloaded: No   Equipment and driveline assessed for damage: Yes     Back up : Serial number: [id number]  Back up battery: Patient use: 4 Replace in: 30  Months  Programmed settings identical to the settings on the primary controller :Yes      Education complete: Yes   Charge the BACKUP controller s backup battery every 6 months  Perform a self test on BACKUP every 6 months  Change the MPU s batteries every 6 months:Yes  Have equipment serviced yearly (if applicable):Yes    2). ALARMS  Alarms reported by patient since last pump evaluation: No  Alarms or other finding noted during pump data history and alarm download: Yes    Action Taken:  Reviewed data with patient: Yes      3). DRESSING CHANGE / DRIVELINE ASSESSMENT  Dressing change completed today: No  Appearance of Driveline site: C/D/I per pt report    Driveline stabilization: Method: [employer]  [ Teaching reinforced on need for stabilization of Driveline. ]

## 2017-08-11 NOTE — LETTER
8/11/2017      RE: Jim Willingham  [address] [place] [id number]       Dear Colleague,    Thank you for the opportunity to participate in the care of your patient, Jim Willingham, at the [facility] HEART [facility]. Please see a copy of my visit note below.    HPI:  Jim Willingham is a 62 year old male with a past medical history of NICM (EF 20%) s/p HM II LVAD placement (6/19/17) complicated by arrhythmias, WALTER, and small left pleural effusion, CKD Stage III, DM Type II, CECILIA, obesity, HTN, COPD, and asthma who is here for heart failure and LVAD follow up.      Jim is feeling well.  He is able to do more.  Still has mild RESENDIZ with walking faster.  Denies PND, orthopnea, edema, weight gain, chest pain, palpitations, lightheadedness, dizziness, near syncopal/syncopal episodes    Denies HA, neurological changes, hematuria, hematochezia, melena, epistaxis, fever, chills or any concerns for driveline infection.    PAST MEDICAL HISTORY:  Past Medical History:   Diagnosis Date     Benign essential hypertension 5/11/2017     Cardiomyopathy, unspecified (H) 5/8/2017     CKD (chronic kidney disease) stage 3, GFR 30-59 ml/min 5/11/2017     Depression 5/11/2017     Diabetes mellitus (H) 5/11/2017     H/O gastric bypass 5/11/2017     ICD (implantable cardioverter-defibrillator), biventricular, in situ 5/11/2017     NICM (nonischemic cardiomyopathy) (H)/ EF 20% 5/11/2017    ECHO: LVEDd. 7.66 cm, Restrictive pattern , Severe mitral valve regurgitation     CECILIA (obstructive sleep apnea) 5/11/2017     Paroxysmal atrial fibrillation (H) 5/11/2017     Paroxysmal VT (H) 5/11/2017     Vitamin B12 deficiency (non anemic) 5/11/2017       FAMILY HISTORY:  Family History   Problem Relation Age of Onset     CEREBROVASCULAR DISEASE Mother      DIABETES Mother      Hypertension Mother      Coronary Artery Disease Father      Type 2 Diabetes Father        SOCIAL HISTORY:  Social History      Social History     Marital status:      Spouse name: N/A     Number of children: N/A     Years of education: N/A     Social History Main Topics     Smoking status: Former Smoker     Quit date: 1995     Smokeless tobacco: Not on file     Alcohol use No     Drug use: Not on file     Sexual activity: Yes     Partners: Female     Other Topics Concern     Parent/Sibling W/ Cabg, Mi Or Angioplasty Before 65f 55m? No     Social History Narrative       CURRENT MEDICATIONS:  Current Outpatient Prescriptions   Medication Sig Dispense Refill     COLCHICINE PO Take by mouth daily       allopurinol (ZYLOPRIM) 100 MG tablet Take 1 tablet (100 mg) by mouth daily 30 tablet 4     losartan (COZAAR) 25 MG tablet Take 1 tablet (25 mg) by mouth daily 60 tablet 3     pantoprazole (PROTONIX) 40 MG EC tablet Take 1 tablet (40 mg) by mouth every morning 30 tablet 1     bumetanide (BUMEX) 2 MG tablet Take 1 tablet (2 mg) by mouth daily 60 tablet 3     metoprolol (TOPROL-XL) 25 MG 24 hr tablet Take 1 tablet (25 mg) by mouth 2 times daily 180 tablet 3     aspirin 81 MG chewable tablet 1 tablet (81 mg) by Oral or Feeding Tube route daily 36 tablet      metFORMIN (GLUCOPHAGE) 500 MG tablet Take 0.5 tablets (250 mg) by mouth 2 times daily (with meals) 60 tablet 0     senna-docusate (SENOKOT-S;PERICOLACE) 8.6-50 MG per tablet Take 2 tablets by mouth 2 times daily as needed for constipation 100 tablet 0     warfarin (COUMADIN) 3 MG tablet Use as directed. 30 tablet 0     traMADol (ULTRAM) 50 MG tablet Take 2 tablets (100 mg) by mouth every 6 hours as needed for moderate pain or breakthrough pain 28 tablet      acetaminophen (TYLENOL) 325 MG tablet Take 2 tablets (650 mg) by mouth every 6 hours 100 tablet      albuterol (ALBUTEROL) 108 (90 BASE) MCG/ACT Inhaler Inhale 2 puffs into the lungs every 4 hours as needed for shortness of breath / dyspnea or wheezing       ipratropium - albuterol 0.5 mg/2.5 mg/3 mL (DUONEB) 0.5-2.5 (3) MG/3ML neb  solution Take 3 mLs by nebulization every 4 hours as needed for shortness of breath / dyspnea or wheezing       citalopram (CELEXA) 20 MG tablet Take 20 mg by mouth daily       cyanocobalamin (VITAMIN B12) 1000 MCG/ML injection Inject 1,000 mcg into the muscle every 30 days       fluticasone-vilanterol (BREO ELLIPTA) 200-25 MCG/INH oral inhaler Inhale 1 puff into the lungs daily       montelukast (SINGULAIR) 10 MG tablet Take 10 mg by mouth At Bedtime       umeclidinium (INCRUSE ELLIPTA) 62.5 MCG/INH oral inhaler Inhale 1 puff into the lungs daily       zinc sulfate (ZINCATE) 220 (50 ZN) MG capsule Take 220 mg by mouth 2 times daily       predniSONE (DELTASONE) 20 MG tablet Take 1 tablet (20 mg) by mouth daily (Patient not taking: Reported on 8/11/2017) 20 tablet 0     traZODone (DESYREL) 50 MG tablet Take 50 mg by mouth nightly x one week then reduce to 25 mg (1/2 tab) by mouth nightly x one week then stop. (Patient not taking: Reported on 8/11/2017) 11 tablet 0     HYDROmorphone (DILAUDID) 2 MG tablet Take 1 tablet (2 mg) by mouth every 4 hours as needed for moderate to severe pain (Patient not taking: Reported on 7/25/2017) 45 tablet 0     polyethylene glycol (MIRALAX/GLYCOLAX) Packet Take 17 g by mouth daily as needed for constipation (for no bm in 1 day) (Patient not taking: Reported on 7/25/2017) 7 packet        ROS:   Constitutional: No fever, chills, or sweats. No weight gain/loss.   ENT: No visual disturbance, ear ache, epistaxis, sore throat.   Allergies/Immunologic: Negative.   Respiratory: No cough, hemoptysis.   Cardiovascular: As per HPI.   GI: No nausea, vomiting, hematemesis, melena, or hematochezia.   : No urinary frequency, dysuria, or hematuria.   Integument: Negative.   Psychiatric: Negative.   Neuro: Negative.   Endocrinology: Negative.   Musculoskeletal: Negative.    EXAM:  BP (!) 82/0 (BP Location: Left arm, Patient Position: Chair, Cuff Size: Adult Large)  Pulse 82  Temp 98.1  F (36.7  C)  "(Oral)  Ht 1.727 m (5' 8\")  Wt 109.1 kg (240 lb 8 oz)  SpO2 99%  BMI 36.57 kg/m2  General: appears comfortable, alert and articulate  Head: normocephalic, atraumatic  Eyes: anicteric sclera, EOMI  Neck: no adenopathy, neck supple  Orophyarynx: moist mucosa, no lesions, dentition intact  Heart: LVAD hum present, no murmur, gallop, rub.  No appreciable JVD.  Lungs: clear, no rales or wheezing  Abdomen: soft, non-tender, bowel sounds present, no hepatomegaly.  LVAD driveline site CDI.  Extremities: no clubbing, cyanosis or edema.  No palpable peripheral pulses.   Neurological: normal speech and affect, no gross motor deficits    Labs:  CBC RESULTS:  Lab Results   Component Value Date    WBC 6.4 08/11/2017    RBC 4.14 (L) 08/11/2017    HGB 10.9 (L) 08/11/2017    HCT 36.0 (L) 08/11/2017    MCV 87 08/11/2017    MCH 26.3 (L) 08/11/2017    MCHC 30.3 (L) 08/11/2017    RDW 15.3 (H) 08/11/2017     08/11/2017       CMP RESULTS:  Lab Results   Component Value Date     08/11/2017    POTASSIUM 3.4 08/11/2017    CHLORIDE 104 08/11/2017    CO2 28 08/11/2017    ANIONGAP 9 08/11/2017     (H) 08/11/2017    BUN 16 08/11/2017    CR 0.96 08/11/2017    GFRESTIMATED 80 08/11/2017    GFRESTBLACK >90   GFR Calc   08/11/2017    QAMAR 8.9 08/11/2017    BILITOTAL 0.8 08/11/2017    ALBUMIN 3.7 08/11/2017    ALKPHOS 100 08/11/2017    ALT 32 08/11/2017    AST 18 08/11/2017        INR RESULTS:  Lab Results   Component Value Date    INR 1.93 (H) 08/11/2017       No components found for: CK  Lab Results   Component Value Date    MAG 2.1 06/30/2017     Lab Results   Component Value Date    NTBNP 2502 (H) 05/12/2017       Most recent echocardiogram:  Ramp study performed today.  See formal report.     Assessment and Plan:    Jim Willingham is a 62 year old male with a past medical history of NICM (EF 20%) s/p HM II LVAD placement who appears to be well compensated today.  Based on his RAMP study we have increased his " LVAD speed from 9200 to 9600 today per Dr. Montgomery's recommendations. He will follow up with [facility]/V[facility] clinic in September as scheduled with an echo prior to evaluate his heart after the speed change.      1.  Chronic systolic heart failure secondary to NICM.  Stage D  NYHA Class III  ACEi/ARB: Deferred as he had worsening renal function on ARB and doesn't tolerate lisinopril secondary to cough.  BB: Toprol XL 25 mg twice daily.  Aldosterone antagonist:  Start Aldactone to 12.5 mg daily.   SCD prophylaxis: CRT-D  Fluid status: Euvolemic  Anticoagulation: Warfarin INR goal 2-3.  INR  1.93 today.  Antiplatelet:  ASA dose 81 mg daily.      2.  LVAD S/P HM II LVAD 6/19/17:  MAPS 82.  Well controlled on current regimen.  Anticoagulation: Warfarin INR goal 2-3.   Antiplatelet:  ASA dose 81 mg daily.  LDH:   297 today.  ( 273 on 7/25)  LVAD interrogation:  Occasional PI events noted with no speed drops or power surges concerning for thrombus or pump malfunction.  Agree with LVAD coordinator's findings.      3.  CKD Stage III:  Stable.  Will continue to monitor creatinine closely with aldactone titration.  Recheck BMP in 2 weeks.      4.  HTN:  Controlled on Toprol XL.        5.  Paroxysmal Atrial Fibrillation: Continue Warfarin and Toprol XL.          6.  Follow-up:  Follow up BMP two weeks.  Follow up with Jeanine Obando NP as scheduled in September.         Cate DUMONT, CNP  [phone]

## 2017-08-11 NOTE — PROGRESS NOTES
HPI:  Jim Willingham is a 62 year old male with a past medical history of NICM (EF 20%) s/p HM II LVAD placement (6/19/17) complicated by arrhythmias, WALTER, and small left pleural effusion, CKD Stage III, DM Type II, CECILIA, obesity, HTN, COPD, and asthma who is here for heart failure and LVAD follow up.      Jim is feeling well.  He is able to do more.  Still has mild RESENDIZ with walking faster.  Denies PND, orthopnea, edema, weight gain, chest pain, palpitations, lightheadedness, dizziness, near syncopal/syncopal episodes    Denies HA, neurological changes, hematuria, hematochezia, melena, epistaxis, fever, chills or any concerns for driveline infection.    PAST MEDICAL HISTORY:  Past Medical History:   Diagnosis Date     Benign essential hypertension 5/11/2017     Cardiomyopathy, unspecified (H) 5/8/2017     CKD (chronic kidney disease) stage 3, GFR 30-59 ml/min 5/11/2017     Depression 5/11/2017     Diabetes mellitus (H) 5/11/2017     H/O gastric bypass 5/11/2017     ICD (implantable cardioverter-defibrillator), biventricular, in situ 5/11/2017     NICM (nonischemic cardiomyopathy) (H)/ EF 20% 5/11/2017    ECHO: LVEDd. 7.66 cm, Restrictive pattern , Severe mitral valve regurgitation     CECILIA (obstructive sleep apnea) 5/11/2017     Paroxysmal atrial fibrillation (H) 5/11/2017     Paroxysmal VT (H) 5/11/2017     Vitamin B12 deficiency (non anemic) 5/11/2017       FAMILY HISTORY:  Family History   Problem Relation Age of Onset     CEREBROVASCULAR DISEASE Mother      DIABETES Mother      Hypertension Mother      Coronary Artery Disease Father      Type 2 Diabetes Father        SOCIAL HISTORY:  Social History     Social History     Marital status:      Spouse name: N/A     Number of children: N/A     Years of education: N/A     Social History Main Topics     Smoking status: Former Smoker     Quit date: 1995     Smokeless tobacco: Not on file     Alcohol use No     Drug use: Not on file     Sexual activity: Yes      Partners: Female     Other Topics Concern     Parent/Sibling W/ Cabg, Mi Or Angioplasty Before 65f 55m? No     Social History Narrative       CURRENT MEDICATIONS:  Current Outpatient Prescriptions   Medication Sig Dispense Refill     COLCHICINE PO Take by mouth daily       allopurinol (ZYLOPRIM) 100 MG tablet Take 1 tablet (100 mg) by mouth daily 30 tablet 4     losartan (COZAAR) 25 MG tablet Take 1 tablet (25 mg) by mouth daily 60 tablet 3     pantoprazole (PROTONIX) 40 MG EC tablet Take 1 tablet (40 mg) by mouth every morning 30 tablet 1     bumetanide (BUMEX) 2 MG tablet Take 1 tablet (2 mg) by mouth daily 60 tablet 3     metoprolol (TOPROL-XL) 25 MG 24 hr tablet Take 1 tablet (25 mg) by mouth 2 times daily 180 tablet 3     aspirin 81 MG chewable tablet 1 tablet (81 mg) by Oral or Feeding Tube route daily 36 tablet      metFORMIN (GLUCOPHAGE) 500 MG tablet Take 0.5 tablets (250 mg) by mouth 2 times daily (with meals) 60 tablet 0     senna-docusate (SENOKOT-S;PERICOLACE) 8.6-50 MG per tablet Take 2 tablets by mouth 2 times daily as needed for constipation 100 tablet 0     warfarin (COUMADIN) 3 MG tablet Use as directed. 30 tablet 0     traMADol (ULTRAM) 50 MG tablet Take 2 tablets (100 mg) by mouth every 6 hours as needed for moderate pain or breakthrough pain 28 tablet      acetaminophen (TYLENOL) 325 MG tablet Take 2 tablets (650 mg) by mouth every 6 hours 100 tablet      albuterol (ALBUTEROL) 108 (90 BASE) MCG/ACT Inhaler Inhale 2 puffs into the lungs every 4 hours as needed for shortness of breath / dyspnea or wheezing       ipratropium - albuterol 0.5 mg/2.5 mg/3 mL (DUONEB) 0.5-2.5 (3) MG/3ML neb solution Take 3 mLs by nebulization every 4 hours as needed for shortness of breath / dyspnea or wheezing       citalopram (CELEXA) 20 MG tablet Take 20 mg by mouth daily       cyanocobalamin (VITAMIN B12) 1000 MCG/ML injection Inject 1,000 mcg into the muscle every 30 days       fluticasone-vilanterol (BREO  "ELLIPTA) 200-25 MCG/INH oral inhaler Inhale 1 puff into the lungs daily       montelukast (SINGULAIR) 10 MG tablet Take 10 mg by mouth At Bedtime       umeclidinium (INCRUSE ELLIPTA) 62.5 MCG/INH oral inhaler Inhale 1 puff into the lungs daily       zinc sulfate (ZINCATE) 220 (50 ZN) MG capsule Take 220 mg by mouth 2 times daily       predniSONE (DELTASONE) 20 MG tablet Take 1 tablet (20 mg) by mouth daily (Patient not taking: Reported on 8/11/2017) 20 tablet 0     traZODone (DESYREL) 50 MG tablet Take 50 mg by mouth nightly x one week then reduce to 25 mg (1/2 tab) by mouth nightly x one week then stop. (Patient not taking: Reported on 8/11/2017) 11 tablet 0     HYDROmorphone (DILAUDID) 2 MG tablet Take 1 tablet (2 mg) by mouth every 4 hours as needed for moderate to severe pain (Patient not taking: Reported on 7/25/2017) 45 tablet 0     polyethylene glycol (MIRALAX/GLYCOLAX) Packet Take 17 g by mouth daily as needed for constipation (for no bm in 1 day) (Patient not taking: Reported on 7/25/2017) 7 packet        ROS:   Constitutional: No fever, chills, or sweats. No weight gain/loss.   ENT: No visual disturbance, ear ache, epistaxis, sore throat.   Allergies/Immunologic: Negative.   Respiratory: No cough, hemoptysis.   Cardiovascular: As per HPI.   GI: No nausea, vomiting, hematemesis, melena, or hematochezia.   : No urinary frequency, dysuria, or hematuria.   Integument: Negative.   Psychiatric: Negative.   Neuro: Negative.   Endocrinology: Negative.   Musculoskeletal: Negative.    EXAM:  BP (!) 82/0 (BP Location: Left arm, Patient Position: Chair, Cuff Size: Adult Large)  Pulse 82  Temp 98.1  F (36.7  C) (Oral)  Ht 1.727 m (5' 8\")  Wt 109.1 kg (240 lb 8 oz)  SpO2 99%  BMI 36.57 kg/m2  General: appears comfortable, alert and articulate  Head: normocephalic, atraumatic  Eyes: anicteric sclera, EOMI  Neck: no adenopathy, neck supple  Orophyarynx: moist mucosa, no lesions, dentition intact  Heart: LVAD hum " present, no murmur, gallop, rub.  No appreciable JVD.  Lungs: clear, no rales or wheezing  Abdomen: soft, non-tender, bowel sounds present, no hepatomegaly.  LVAD driveline site CDI.  Extremities: no clubbing, cyanosis or edema.  No palpable peripheral pulses.   Neurological: normal speech and affect, no gross motor deficits    Labs:  CBC RESULTS:  Lab Results   Component Value Date    WBC 6.4 08/11/2017    RBC 4.14 (L) 08/11/2017    HGB 10.9 (L) 08/11/2017    HCT 36.0 (L) 08/11/2017    MCV 87 08/11/2017    MCH 26.3 (L) 08/11/2017    MCHC 30.3 (L) 08/11/2017    RDW 15.3 (H) 08/11/2017     08/11/2017       CMP RESULTS:  Lab Results   Component Value Date     08/11/2017    POTASSIUM 3.4 08/11/2017    CHLORIDE 104 08/11/2017    CO2 28 08/11/2017    ANIONGAP 9 08/11/2017     (H) 08/11/2017    BUN 16 08/11/2017    CR 0.96 08/11/2017    GFRESTIMATED 80 08/11/2017    GFRESTBLACK >90   GFR Calc   08/11/2017    QAMAR 8.9 08/11/2017    BILITOTAL 0.8 08/11/2017    ALBUMIN 3.7 08/11/2017    ALKPHOS 100 08/11/2017    ALT 32 08/11/2017    AST 18 08/11/2017        INR RESULTS:  Lab Results   Component Value Date    INR 1.93 (H) 08/11/2017       No components found for: CK  Lab Results   Component Value Date    MAG 2.1 06/30/2017     Lab Results   Component Value Date    NTBNP 2502 (H) 05/12/2017       Most recent echocardiogram:  Ramp study performed today.  See formal report.     Assessment and Plan:    Jim Willingham is a 62 year old male with a past medical history of NICM (EF 20%) s/p HM II LVAD placement who appears to be well compensated today.  Based on his RAMP study we have increased his LVAD speed from 9200 to 9600 today per Dr. Montgomery's recommendations. He will follow up with [facility] clinic in September as scheduled with an echo prior to evaluate his heart after the speed change.      1.  Chronic systolic heart failure secondary to NICM.  Stage D  NYHA Class III  ACEi/ARB: Deferred as  he had worsening renal function on ARB and doesn't tolerate lisinopril secondary to cough.  BB: Toprol XL 25 mg twice daily.  Aldosterone antagonist: Start Aldactone to 12.5 mg daily.   SCD prophylaxis: CRT-D  Fluid status: Euvolemic  Anticoagulation: Warfarin INR goal 2-3.  INR 1.93 today.  Antiplatelet:  ASA dose 81 mg daily.      2.  LVAD S/P HM II LVAD 6/19/17:  MAPS 82.  Well controlled on current regimen.  Anticoagulation: Warfarin INR goal 2-3.   Antiplatelet:  ASA dose 81 mg daily.  LDH:  297 today.  (273 on 7/25)  LVAD interrogation:  Occasional PI events noted with no speed drops or power surges concerning for thrombus or pump malfunction.  Agree with LVAD coordinator's findings.      3.  CKD Stage III:  Stable.  Will continue to monitor creatinine closely with aldactone titration.  Recheck BMP in 2 weeks.      4.  HTN:  Controlled on Toprol XL.        5.  Paroxysmal Atrial Fibrillation: Continue Warfarin and Toprol XL.          6.  Follow-up:  Follow up BMP two weeks.  Follow up with Jeanine Obando NP as scheduled in September.         Cate Marshall APRN, CNP  [phone]

## 2017-08-11 NOTE — MR AVS SNAPSHOT
Jim Willingham   8/11/2017   Anticoagulation Therapy Visit    Description:  62 year old male   Provider:  Maru Alonso, RN   Department:  [facility]           INR as of 8/11/2017     Today's INR 1.93!      Anticoagulation Summary as of 8/11/2017     INR goal 2.0-3.0   Today's INR 1.93!   Full instructions 8/11: 7.5 mg; 8/12: 6 mg; 8/13: 6 mg; Otherwise No maintenance plan   Next INR check 8/14/2017    Indications   LVAD (left ventricular assist device) present (H) [Z95.811]  Long-term (current) use of anticoagulants [Z79.01] [Z79.01]         August 2017 Details    Sun Mon Tue Wed Thu Fri Sat       1               2               3               4               5                 6               7               8               9               10               11      7.5 mg   See details      12      6 mg           13      6 mg         14            15               16               17               18               19                 20               21               22               23               24               25               26                 27               28               29               30               31                  Date Details   08/11 This INR check       Date of next INR:  8/14/2017         How to take your warfarin dose     To take:  6 mg Take 2 of the 3 mg tablets.    To take:  7.5 mg Take 2.5 of the 3 mg tablets.

## 2017-08-11 NOTE — PROGRESS NOTES
ANTICOAGULATION FOLLOW-UP CLINIC VISIT    Patient Name:  Jim Willingham  Date:  8/11/2017  Contact Type:  Telephone    SUBJECTIVE:     Patient Findings     Comments Instructions were given to increase ASA to 325mg daily until INR in therapeutic goal range.           OBJECTIVE    INR   Date Value Ref Range Status   08/11/2017 1.93 (H) 0.86 - 1.14 Final       ASSESSMENT / PLAN  INR assessment THER    Recheck INR In: 3 DAYS    INR Location Clinic      Anticoagulation Summary as of 8/11/2017     INR goal 2.0-3.0   Today's INR 1.93!   Maintenance plan No maintenance plan   Full instructions 8/11: 7.5 mg; 8/12: 6 mg; 8/13: 6 mg; Otherwise No maintenance plan   Next INR check 8/14/2017   Priority INR   Target end date     Indications   LVAD (left ventricular assist device) present (H) [Z95.811]  Long-term (current) use of anticoagulants [Z79.01] [Z79.01]         Anticoagulation Episode Summary     INR check location     Preferred lab     Send INR reminders to [facility]    Comments HIPPA Forms mailed 6/26/17  Labs drawn either at [facility] or [facility]      Anticoagulation Care Providers     Provider Role Specialty Phone number    Delisa Montgomery MD Responsible Cardiology [phone]            See the Encounter Report to view Anticoagulation Flowsheet and Dosing Calendar (Go to Encounters tab in chart review, and find the Anticoagulation Therapy Visit)    Spoke with Tatyana Alonso RN

## 2017-08-11 NOTE — NURSING NOTE
Chief Complaint   Patient presents with     Follow Up For     VAD F/U     Vitals were taken and medications were reconciled.     Luis E Hernandez MA  10:44 AM

## 2017-08-14 ENCOUNTER — ANTICOAGULATION THERAPY VISIT (OUTPATIENT)
Dept: ANTICOAGULATION | Facility: CLINIC | Age: 62
End: 2017-08-14

## 2017-08-14 DIAGNOSIS — Z79.01 LONG-TERM (CURRENT) USE OF ANTICOAGULANTS: ICD-10-CM

## 2017-08-14 DIAGNOSIS — Z95.811 LVAD (LEFT VENTRICULAR ASSIST DEVICE) PRESENT (H): ICD-10-CM

## 2017-08-14 LAB — INR PPP: 2.18 (ref 0.86–1.14)

## 2017-08-14 PROCEDURE — 36415 COLL VENOUS BLD VENIPUNCTURE: CPT | Performed by: INTERNAL MEDICINE

## 2017-08-14 PROCEDURE — 85610 PROTHROMBIN TIME: CPT | Performed by: INTERNAL MEDICINE

## 2017-08-14 NOTE — MR AVS SNAPSHOT
Jim Willingham   8/14/2017   Anticoagulation Therapy Visit    Description:  62 year old male   Provider:  Brendan Montoya, RN   Department:  [facility]           INR as of 8/14/2017     Today's INR 2.18      Anticoagulation Summary as of 8/14/2017     INR goal 2.0-3.0   Today's INR 2.18   Full instructions 8/14: 6 mg; 8/15: 6 mg; 8/16: 6 mg; Otherwise No maintenance plan   Next INR check 8/17/2017    Indications   LVAD (left ventricular assist device) present (H) [Z95.811]  Long-term (current) use of anticoagulants [Z79.01] [Z79.01]         August 2017 Details    Sun Mon Tue Wed Thu Fri Sat       1               2               3               4               5                 6               7               8               9               10               11               12                 13               14      6 mg   See details      15      6 mg         16      6 mg         17            18               19                 20               21               22               23               24               25               26                 27               28               29               30               31                  Date Details   08/14 This INR check       Date of next INR:  8/17/2017         How to take your warfarin dose     To take:  6 mg Take 2 of the 3 mg tablets.

## 2017-08-14 NOTE — PROGRESS NOTES
ANTICOAGULATION FOLLOW-UP CLINIC VISIT    Patient Name:  Jim Willingham  Date:  8/14/2017  Contact Type:  Telephone    SUBJECTIVE:     Patient Findings     Positives Change in medications, No Problem Findings    Comments INR result is therapeutic, advised Jim to discontinue 325mg aspirin.  Will continue 81mg aspirin.           OBJECTIVE    INR   Date Value Ref Range Status   08/14/2017 2.18 (H) 0.86 - 1.14 Final       ASSESSMENT / PLAN  INR assessment THER    Recheck INR In: 3 DAYS    INR Location Clinic      Anticoagulation Summary as of 8/14/2017     INR goal 2.0-3.0   Today's INR 2.18   Maintenance plan No maintenance plan   Full instructions 8/14: 6 mg; 8/15: 6 mg; 8/16: 6 mg; Otherwise No maintenance plan   Next INR check 8/17/2017   Priority INR   Target end date     Indications   LVAD (left ventricular assist device) present (H) [Z95.811]  Long-term (current) use of anticoagulants [Z79.01] [Z79.01]         Anticoagulation Episode Summary     INR check location     Preferred lab     Send INR reminders to [facility]    Comments HIPPA Forms mailed 6/26/17  Labs drawn either at [facility] or [facility]      Anticoagulation Care Providers     Provider Role Specialty Phone number    Delisa Montgomery MD Responsible Cardiology [phone]            See the Encounter Report to view Anticoagulation Flowsheet and Dosing Calendar (Go to Encounters tab in chart review, and find the Anticoagulation Therapy Visit)    Spoke with patient. Gave them their lab results and new warfarin recommendation.  No changes in health, medication, or diet. No missed doses, no falls. No signs or symptoms of bleed or clotting.    Brendan Montoya RN

## 2017-08-17 ENCOUNTER — ANTICOAGULATION THERAPY VISIT (OUTPATIENT)
Dept: ANTICOAGULATION | Facility: CLINIC | Age: 62
End: 2017-08-17

## 2017-08-17 DIAGNOSIS — M10.00 ACUTE IDIOPATHIC GOUT, UNSPECIFIED SITE: ICD-10-CM

## 2017-08-17 DIAGNOSIS — G89.18 ACUTE POST-OPERATIVE PAIN: ICD-10-CM

## 2017-08-17 DIAGNOSIS — Z79.01 LONG-TERM (CURRENT) USE OF ANTICOAGULANTS: ICD-10-CM

## 2017-08-17 DIAGNOSIS — Z95.811 LVAD (LEFT VENTRICULAR ASSIST DEVICE) PRESENT (H): ICD-10-CM

## 2017-08-17 LAB — INR BLD: 2.5 (ref 0.86–1.14)

## 2017-08-17 PROCEDURE — 36416 COLLJ CAPILLARY BLOOD SPEC: CPT | Performed by: INTERNAL MEDICINE

## 2017-08-17 PROCEDURE — 85610 PROTHROMBIN TIME: CPT | Mod: QW | Performed by: INTERNAL MEDICINE

## 2017-08-17 NOTE — MR AVS SNAPSHOT
Jim Willingham   8/17/2017   Anticoagulation Therapy Visit    Description:  62 year old male   Provider:  Brendan Montoya, RN   Department:  [facility]           INR as of 8/17/2017     Today's INR 2.5      Anticoagulation Summary as of 8/17/2017     INR goal 2.0-3.0   Today's INR 2.5   Full instructions 8/17: 6 mg; 8/18: 6 mg; 8/19: 4.5 mg; 8/20: 6 mg; Otherwise No maintenance plan   Next INR check 8/21/2017    Indications   LVAD (left ventricular assist device) present (H) [Z95.811]  Long-term (current) use of anticoagulants [Z79.01] [Z79.01]         August 2017 Details    Sun Mon Tue Wed Thu Fri Sat       1               2               3               4               5                 6               7               8               9               10               11               12                 13               14               15               16               17      6 mg   See details      18      6 mg         19      4.5 mg           20      6 mg         21            22               23               24               25               26                 27               28               29               30               31                  Date Details   08/17 This INR check       Date of next INR:  8/21/2017         How to take your warfarin dose     To take:  4.5 mg Take 1.5 of the 3 mg tablets.    To take:  6 mg Take 2 of the 3 mg tablets.

## 2017-08-17 NOTE — TELEPHONE ENCOUNTER
Pharmacy is requesting a refill request for prednisone, but pt chart states that they are not taking

## 2017-08-18 ENCOUNTER — CARE COORDINATION (OUTPATIENT)
Dept: CARDIOLOGY | Facility: CLINIC | Age: 62
End: 2017-08-18

## 2017-08-18 DIAGNOSIS — Z95.811 LVAD (LEFT VENTRICULAR ASSIST DEVICE) PRESENT (H): ICD-10-CM

## 2017-08-18 DIAGNOSIS — I50.22 CHRONIC SYSTOLIC CONGESTIVE HEART FAILURE (H): ICD-10-CM

## 2017-08-18 RX ORDER — PREDNISONE 20 MG/1
20 TABLET ORAL DAILY
Qty: 20 TABLET | Refills: 0 | OUTPATIENT
Start: 2017-08-18

## 2017-08-18 RX ORDER — SPIRONOLACTONE 25 MG/1
12.5 TABLET ORAL DAILY
Qty: 30 TABLET | Refills: 5 | Status: SHIPPED | OUTPATIENT
Start: 2017-08-18 | End: 2017-08-28

## 2017-08-18 NOTE — PROGRESS NOTES
Called patient/caregiver to check in 6 weeks post discharge. Pt reports VAD parameters WNL and weight stable. Reviewed medications and answered any questions. Patient reports sleeping well and no anxiety since being home with LVAD. Patient is able to move around the house and care for himself independently.     Discussed specific new problems/stressors since being discharged from the hospital: pt reports being very active the last few days and feeling worn out. Discussed ways to space out activity and give his body time to continue to heal. Empathized with patient and reviewed coping strategies: enlisting support from friends and love ones, attending patient and caregiver support groups, reviewing LVAD educational materials to reinforce knowledge, and talking about concerns with family/care providers/trusted others. Encouraged pt to page VAD Coordinator with any issues or questions. Pt verbalizes understanding.

## 2017-08-21 ENCOUNTER — ANTICOAGULATION THERAPY VISIT (OUTPATIENT)
Dept: ANTICOAGULATION | Facility: CLINIC | Age: 62
End: 2017-08-21

## 2017-08-21 ENCOUNTER — OFFICE VISIT (OUTPATIENT)
Dept: CARDIOLOGY | Facility: CLINIC | Age: 62
End: 2017-08-21
Attending: SURGERY
Payer: COMMERCIAL

## 2017-08-21 VITALS — BODY MASS INDEX: 37.41 KG/M2 | HEIGHT: 68 IN | SYSTOLIC BLOOD PRESSURE: 74 MMHG | WEIGHT: 246.8 LBS

## 2017-08-21 DIAGNOSIS — Z79.01 LONG-TERM (CURRENT) USE OF ANTICOAGULANTS: ICD-10-CM

## 2017-08-21 DIAGNOSIS — Z95.811 LVAD (LEFT VENTRICULAR ASSIST DEVICE) PRESENT (H): Primary | ICD-10-CM

## 2017-08-21 DIAGNOSIS — Z95.811 LVAD (LEFT VENTRICULAR ASSIST DEVICE) PRESENT (H): ICD-10-CM

## 2017-08-21 DIAGNOSIS — I50.22 CHRONIC SYSTOLIC CONGESTIVE HEART FAILURE (H): ICD-10-CM

## 2017-08-21 LAB
ANION GAP SERPL CALCULATED.3IONS-SCNC: 11 MMOL/L (ref 3–14)
BUN SERPL-MCNC: 29 MG/DL (ref 7–30)
CALCIUM SERPL-MCNC: 8.7 MG/DL (ref 8.5–10.1)
CHLORIDE SERPL-SCNC: 102 MMOL/L (ref 94–109)
CO2 SERPL-SCNC: 28 MMOL/L (ref 20–32)
CREAT SERPL-MCNC: 1.27 MG/DL (ref 0.66–1.25)
GFR SERPL CREATININE-BSD FRML MDRD: 58 ML/MIN/1.7M2
GLUCOSE SERPL-MCNC: 121 MG/DL (ref 70–99)
INR PPP: 2.51 (ref 0.86–1.14)
POTASSIUM SERPL-SCNC: 3.6 MMOL/L (ref 3.4–5.3)
SODIUM SERPL-SCNC: 141 MMOL/L (ref 133–144)

## 2017-08-21 PROCEDURE — 80048 BASIC METABOLIC PNL TOTAL CA: CPT | Performed by: INTERNAL MEDICINE

## 2017-08-21 PROCEDURE — 99212 OFFICE O/P EST SF 10 MIN: CPT | Mod: ZF

## 2017-08-21 PROCEDURE — 99215 OFFICE O/P EST HI 40 MIN: CPT | Mod: 25,ZF

## 2017-08-21 PROCEDURE — 93750 INTERROGATION VAD IN PERSON: CPT | Mod: ZF | Performed by: SURGERY

## 2017-08-21 PROCEDURE — 36415 COLL VENOUS BLD VENIPUNCTURE: CPT | Performed by: INTERNAL MEDICINE

## 2017-08-21 PROCEDURE — 85610 PROTHROMBIN TIME: CPT | Performed by: INTERNAL MEDICINE

## 2017-08-21 ASSESSMENT — PAIN SCALES - GENERAL: PAINLEVEL: NO PAIN (0)

## 2017-08-21 NOTE — NURSING NOTE
1). PUMP DATA  Primary controller serial number: [id number]     II:   Flow: 5.7 L/min,    Speed: 9600 RPMs,     PI: 4.8 ,  Power: 6.5 Manuel,      Primary controller   Back up battery: Patient use: 8, Replace in: 28  Months     Data downloaded: No   Equipment and driveline assessed for damage: Yes     Back up : Serial number: [id number]  Back up battery: Patient use: 4 Replace in: 30  Months  Programmed settings identical to the settings on the primary controller :Yes      Education complete: Yes   Charge the BACKUP controller s backup battery every 6 months  Perform a self test on BACKUP every 6 months  Change the MPU s batteries every 6 months:Yes  Have equipment serviced yearly (if applicable):Yes      2). ALARMS  Alarms reported by patient since last pump evaluation: No  Alarms or other finding noted during pump data history and alarm download: Yes - several PI events. No speed changes or power elevations, BP 74. Pt denies dizziness.   Action Taken:  Reviewed data with patient: Yes      3). DRESSING CHANGE / DRIVELINE ASSESSMENT  Dressing change completed today: No  Appearance of Driveline site: c/d/i per pt report    Driveline stabilization: Method: [employer]  [ Teaching reinforced on need for stabilization of Driveline. ]

## 2017-08-21 NOTE — PATIENT INSTRUCTIONS
Medications:  1. No changes  Follow-up:  1. 9/15 with Jeanine Wilson NP  Instructions:  1. Rajani will mail your shower bag to you.     Great to see you in clinic today. Let us know if you need anything anytime via our pager at [phone] option #4 and ask to speak to the VAD coordinator on call.

## 2017-08-21 NOTE — MR AVS SNAPSHOT
Jim Willingham   8/21/2017   Anticoagulation Therapy Visit    Description:  62 year old male   Provider:  Radha Pedro, RN   Department:  [facility]           INR as of 8/21/2017     Today's INR 2.51      Anticoagulation Summary as of 8/21/2017     INR goal 2.0-3.0   Today's INR 2.51   Full instructions 4.5 mg on Sat; 6 mg all other days   Next INR check 8/28/2017    Indications   LVAD (left ventricular assist device) present (H) [Z95.811]  Long-term (current) use of anticoagulants [Z79.01] [Z79.01]         August 2017 Details    Sun Mon Tue Wed Thu Fri Sat       1               2               3               4               5                 6               7               8               9               10               11               12                 13               14               15               16               17               18               19                 20               21      6 mg   See details      22      6 mg         23      6 mg         24      6 mg         25      6 mg         26      4.5 mg           27      6 mg         28            29               30               31                  Date Details   08/21 This INR check       Date of next INR:  8/28/2017         How to take your warfarin dose     To take:  4.5 mg Take 1.5 of the 3 mg tablets.    To take:  6 mg Take 2 of the 3 mg tablets.

## 2017-08-21 NOTE — NURSING NOTE
Chief Complaint   Patient presents with     Follow Up For     Post VAD f/u 9wks     Vitals were taken and medications were reconciled.  Cristhian Gipson, АНДРЕЙA  1:43 PM

## 2017-08-21 NOTE — PROGRESS NOTES
ANTICOAGULATION FOLLOW-UP CLINIC VISIT    Patient Name:  Jim Willingham  Date:  8/21/2017  Contact Type:  Telephone    SUBJECTIVE:     Patient Findings     Positives No Problem Findings           OBJECTIVE    INR   Date Value Ref Range Status   08/21/2017 2.51 (H) 0.86 - 1.14 Final       ASSESSMENT / PLAN  INR assessment THER    Recheck INR In: 1 WEEK    INR Location Clinic      Anticoagulation Summary as of 8/21/2017     INR goal 2.0-3.0   Today's INR 2.51   Maintenance plan 4.5 mg (3 mg x 1.5) on Sat; 6 mg (3 mg x 2) all other days   Full instructions 4.5 mg on Sat; 6 mg all other days   Weekly total 40.5 mg   Plan last modified Radha Pedro RN (8/21/2017)   Next INR check 8/28/2017   Priority INR   Target end date     Indications   LVAD (left ventricular assist device) present (H) [Z95.811]  Long-term (current) use of anticoagulants [Z79.01] [Z79.01]         Anticoagulation Episode Summary     INR check location     Preferred lab     Send INR reminders to [facility]    Comments HIPPA Forms mailed 6/26/17  Labs drawn either at [facility] or [facility]      Anticoagulation Care Providers     Provider Role Specialty Phone number    Delisa Montgomery MD Responsible Cardiology [phone]            See the Encounter Report to view Anticoagulation Flowsheet and Dosing Calendar (Go to Encounters tab in chart review, and find the Anticoagulation Therapy Visit)    Spoke with patient. Gave them their lab results and new warfarin recommendation.  No changes in health, medication, or diet. No missed doses, no falls. No signs or symptoms of bleed or clotting.     Initiating maintenance plan for pt and will recheck INR in a week     Radha Pedro RN

## 2017-08-21 NOTE — LETTER
8/21/2017      RE: Jim Willingham  [address] [place] [id number]       Dear Colleague,    Thank you for the opportunity to participate in the care of your patient, Jim Willingham, at the [facility] HEART [facility]. Please see a copy of my visit note below.    HPI:   Jim Willingham is a 62 year old male with a past medical history of NICM (EF 20%) s/p HM II LVAD placement (6/19/17) complicated by arrhythmias, WALTER, and small left pleural effusion, CKD Stage III, DM Type II, CECILIA, obesity, HTN, COPD, and asthma who is here for heart failure and LVAD follow up.   He is doing well from a functional view point and reports improved exertional tolerance. He denies any fever, chills, palpitations or chest pain. He seems to have recovered well after LVAD implant surgery.        PAST MEDICAL HISTORY:   Past Medical History         Past Medical History:   Diagnosis Date     Benign essential hypertension 5/11/2017     Cardiomyopathy, unspecified (H) 5/8/2017     CKD (chronic kidney disease) stage 3, GFR 30-59 ml/min 5/11/2017     Depression 5/11/2017     Diabetes mellitus (H) 5/11/2017     H/O gastric bypass 5/11/2017     ICD (implantable cardioverter-defibrillator), biventricular, in situ 5/11/2017     NICM (nonischemic cardiomyopathy) (H)/ EF 20% 5/11/2017     ECHO: LVEDd. 7.66 cm, Restrictive pattern , Severe mitral valve regurgitation     CECILIA (obstructive sleep apnea) 5/11/2017     Paroxysmal atrial fibrillation (H) 5/11/2017     Paroxysmal VT (H) 5/11/2017     Vitamin B12 deficiency (non anemic) 5/11/2017            FAMILY HISTORY:   Family History          Family History   Problem Relation Age of Onset     CEREBROVASCULAR DISEASE Mother       DIABETES Mother       Hypertension Mother       Coronary Artery Disease Father       Type 2 Diabetes Father           SOCIAL HISTORY:  Social History            Social History     Marital status:        Spouse name: N/A      Number of children: N/A     Years of education: N/A            Social History Main Topics     Smoking status: Former Smoker       Quit date: 1995     Smokeless tobacco: Not on file     Alcohol use No     Drug use: Not on file     Sexual activity: Yes       Partners: Female           Other Topics Concern     Parent/Sibling W/ Cabg, Mi Or Angioplasty Before 65f 55m? No      Social History Narrative         CURRENT MEDICATIONS:     Current Outpatient Prescriptions on File Prior to Visit:  bumetanide (BUMEX) 1 MG tablet Take 2 tablets (2 mg) by mouth daily   spironolactone (ALDACTONE) 25 MG tablet Take 1 tablet (25 mg) by mouth daily   allopurinol (ZYLOPRIM) 100 MG tablet Take 1 tablet (100 mg) by mouth daily   losartan (COZAAR) 25 MG tablet Take 1 tablet (25 mg) by mouth daily   pantoprazole (PROTONIX) 40 MG EC tablet Take 1 tablet (40 mg) by mouth every morning   metoprolol (TOPROL-XL) 25 MG 24 hr tablet Take 1 tablet (25 mg) by mouth 2 times daily   aspirin 81 MG chewable tablet 1 tablet (81 mg) by Oral or Feeding Tube route daily   metFORMIN (GLUCOPHAGE) 500 MG tablet Take 0.5 tablets (250 mg) by mouth 2 times daily (with meals)   warfarin (COUMADIN) 3 MG tablet Use as directed.   traMADol (ULTRAM) 50 MG tablet Take 2 tablets (100 mg) by mouth every 6 hours as needed for moderate pain or breakthrough pain   acetaminophen (TYLENOL) 325 MG tablet Take 2 tablets (650 mg) by mouth every 6 hours   albuterol (ALBUTEROL) 108 (90 BASE) MCG/ACT Inhaler Inhale 2 puffs into the lungs every 4 hours as needed for shortness of breath / dyspnea or wheezing   ipratropium - albuterol 0.5 mg/2.5 mg/3 mL (DUONEB) 0.5-2.5 (3) MG/3ML neb solution Take 3 mLs by nebulization every 4 hours as needed for shortness of breath / dyspnea or wheezing   citalopram (CELEXA) 20 MG tablet Take 20 mg by mouth daily   cyanocobalamin (VITAMIN B12) 1000 MCG/ML injection Inject 1,000 mcg into the muscle every 30 days   fluticasone-vilanterol (BREO  ELLIPTA) 200-25 MCG/INH oral inhaler Inhale 1 puff into the lungs daily   montelukast (SINGULAIR) 10 MG tablet Take 10 mg by mouth At Bedtime   umeclidinium (INCRUSE ELLIPTA) 62.5 MCG/INH oral inhaler Inhale 1 puff into the lungs daily   zinc sulfate (ZINCATE) 220 (50 ZN) MG capsule Take 220 mg by mouth 2 times daily   enoxaparin (ENOXAPARIN) 120 MG/0.8ML injection Inject 110mgs subq every 12 hours as directed by the Anticoagulation Clinic   COLCHICINE PO Take by mouth daily   predniSONE (DELTASONE) 20 MG tablet Take 1 tablet (20 mg) by mouth daily (Patient not taking: Reported on 8/11/2017)   senna-docusate (SENOKOT-S;PERICOLACE) 8.6-50 MG per tablet Take 2 tablets by mouth 2 times daily as needed for constipation (Patient not taking: Reported on 9/15/2017)   traZODone (DESYREL) 50 MG tablet Take 50 mg by mouth nightly x one week then reduce to 25 mg (1/2 tab) by mouth nightly x one week then stop. (Patient not taking: Reported on 8/11/2017)   HYDROmorphone (DILAUDID) 2 MG tablet Take 1 tablet (2 mg) by mouth every 4 hours as needed for moderate to severe pain (Patient not taking: Reported on 7/25/2017)   polyethylene glycol (MIRALAX/GLYCOLAX) Packet Take 17 g by mouth daily as needed for constipation (for no bm in 1 day) (Patient not taking: Reported on 9/15/2017)      No current facility-administered medications on file prior to visit.      ROS:   Constitutional: No fever, chills, or sweats. No weight gain/loss.   ENT: No visual disturbance, ear ache, epistaxis, sore throat.   Allergies/Immunologic: Negative.   Respiratory: No cough, hemoptysis.   Cardiovascular: As per HPI.   GI: No nausea, vomiting, hematemesis, melena, or hematochezia.   : No urinary frequency, dysuria, or hematuria.   Integument: Negative.   Psychiatric: Negative.   Neuro: Negative.   Endocrinology: Negative.   Musculoskeletal: Negative.     EXAM:  BP (!) 78/0 (BP Location: Right arm, Patient Position: Chair, Cuff Size: Adult Regular)   "Pulse 74  Temp 98  F (36.7  C)  Ht 1.727 m (5' 8\")  Wt 113.1 kg (249 lb 4.8 oz)  SpO2 96%  BMI 37.91 kg/m2  General: appears comfortable, alert and articulate  Head: normocephalic, atraumatic  Eyes: anicteric sclera, EOMI  Neck: no adenopathy, neck supple  Orophyarynx: moist mucosa, no lesions, dentition intact  Heart: LVAD hum present, no murmur, gallop, rub.  No appreciable JVD.  Lungs: clear, no rales or wheezing  Abdomen: soft, non-tender, bowel sounds present, no hepatomegaly.  LVAD driveline site CDI.  Extremities: no clubbing, cyanosis or edema.  No palpable peripheral pulses.   Neurological: normal speech and affect, no gross motor deficits          Labs:  Reviewed CBC, CMP, LDH, INR,      Most recent echocardiogram:  ECHO LVAD RAMP 8/11/17  Interpretation Summary  LVAD optimization study.     9800 RPM LVIDd 6.4 cm, LVIDs 6.2 cm AV remains closed, mild MR, septum closer  to inflow cannula  9600 RPM LVIDd 6.69 cm, LVIDs 6.28, AV opens intermittently, mild MR, septum  closer to inflow cannula  9400 rpm LVIDd 6.68 cm, LVIDs 6.32 cm, AV opens partially every other  beat,mild to moderate MR, septum neutral  9000 RPM.LVIDd 6.87, LVIDs 6.31 cm, AV opens every other beat, mild to  moderate MR,septum neutral, normal inflow velocity, outflow velocity cannot be  assessed.  8800 rpm LVIDd 6.8 cm, LVIDs 6.22 cm, AV opens every beat , moderate MR,  septum neutral     Estimated mean right atrial pressure is 15 mmHg.  The inferior vena cava was dilated at 2.98 cm without respiratory variability,  consistent with increased right atrial pressure.  Global right ventricular function is moderately reduced.  No pericardial effusion is present.     Assessment and Plan:    Jim Willingham is a 62 year old male with a past medical history of NICM (EF 20%) s/p HM II LVAD placement who is doing well from a functional view point. I have instructed him to do meticulous driveline dressing changes and to let us know if there is any " evidence of driveline infection. I am overall pleased with his progress and recommend continued medical management.       Ronnie Quigley MD

## 2017-08-21 NOTE — MR AVS SNAPSHOT
After Visit Summary   8/21/2017    Jim Willingham    MRN: [id number]           Patient Information     Date Of Birth          1955        Visit Information        Provider Department      8/21/2017 2:30 PM Ronnie Quigley MD [facility]        Today's Diagnoses     LVAD (left ventricular assist device) present (H)    -  1      Care Instructions    Medications:  1. No changes  Follow-up:  1. 9/15 with Jeanine Wilson NP  Instructions:  1. Rajani will mail your shower bag to you.     Great to see you in clinic today. Let us know if you need anything anytime via our pager at [phone] option #4 and ask to speak to the VAD coordinator on call.               Follow-ups after your visit        Your next 10 appointments already scheduled     Sep 15, 2017  9:00 AM CDT   Lab with  LAB   [facility] ([facility])    [address] [place] [id number] [phone]            Sep 15, 2017  9:30 AM CDT   (Arrive by 9:15 AM)   Implanted Defibulator with  Cv Device 1   [facility] ([facility])    [address] [place] [id number] [phone]            Sep 15, 2017 10:00 AM CDT   (Arrive by 9:45 AM)   Ventricular Assist Device with Jeanine Wilson NP   [facility] ([facility])    [address] [place] [id number] [phone]            Sep 15, 2017 11:00 AM CDT   FULL PULMONARY FUNCTION with  PFL C   [facility] Pulmonary Function Testing ([facility])    [address] [place] [address] [id number] [phone]            Sep 15, 2017  1:30 PM CDT   Ech Complete Lvad* with [email or web address]   [facility] Echo ([facility])    [address] [place] [id number] [phone]           1.  Please bring or wear a comfortable two-piece outfit. 2.  You may  "eat, drink and take your normal medicines. 3.  For any questions that cannot be answered, please contact the ordering physician            Dec 15, 2017  8:00 AM CST   Lab with UC LAB   [facility] ([facility])    [address] [place] [id number] [phone]            Dec 15, 2017  8:30 AM CST   (Arrive by 8:15 AM)   Implanted Defibulator with Uc Cv Device 1   [facility] ([facility])    [address] [place] [id number] [phone]            Dec 15, 2017  9:00 AM CST   (Arrive by 8:45 AM)   Ventricular Assist Device with Delisa Montgomery MD   [facility])    [address] [place] [address] [id number] [phone]              Who to contact     If you have questions or need follow up information about today's clinic visit or your schedule please contact [facility] directly at [phone].  Normal or non-critical lab and imaging results will be communicated to you by [employer], letter or phone within 4 business days after the clinic has received the results. If you do not hear from us within 7 days, please contact the clinic through [employer] or phone. If you have a critical or abnormal lab result, we will notify you by phone as soon as possible.  Submit refill requests through Dial2Do or call your pharmacy and they will forward the refill request to us. Please allow 3 business days for your refill to be completed.          Additional Information About Your Visit        [employer] Information     Dial2Do lets you send messages to your doctor, view your test results, renew your prescriptions, schedule appointments and more. To sign up, go to [email or web address] . Click on \"Log in\" on the left side of the screen, which will take you to the Welcome page. Then click on \"Sign up Now\" on the right side of the page.     You will be " "asked to enter the access code listed below, as well as some personal information. Please follow the directions to create your username and password.     Your access code is: [email or web address]  Expires: 2017 11:18 AM     Your access code will  in 90 days. If you need help or a new code, please call your [place] clinic or [phone].        Care EveryWhere ID     This is your Care EveryWhere ID. This could be used by other organizations to access your [place] medical records  [id number]        Your Vitals Were     Height BMI (Body Mass Index)                1.727 m (5' 8\") 37.53 kg/m2           Blood Pressure from Last 3 Encounters:   17 (!) 74/0   17 (!) 82/0   17 (!) 82/0    Weight from Last 3 Encounters:   17 111.9 kg (246 lb 12.8 oz)   17 109.1 kg (240 lb 8 oz)   17 110.9 kg (244 lb 9.6 oz)              We Performed the Following     (69941) INTERROGATE VENT ASSIST DEVICE, IN PERSON, DAGO JETER ANALYSIS PARAMETERS        Primary Care Provider Office Phone # Fax #    Jovany Salas [phone]       [place] [address]        Equal Access to Services     [facility] Center[facility] : Hadii andrea de guzmano Sobrandon, waaxda luqadaha, qaybta kaalyu larson, kendra marquez . So [facility] [phone].    ATENCIÓN: Si habla español, tiene a roger disposición servicios gratuitos de asistencia lingüística. Llame al [phone].    We comply with applicable federal civil rights laws and [place] laws. We do not discriminate on the basis of race, color, national origin, age, disability sex, sexual orientation or gender identity.            Thank you!     Thank you for choosing [facility]  for your care. Our goal is always to provide you with excellent care. Hearing back from our patients is one way we can continue to improve our services. Please take a few minutes to complete the written survey that you " may receive in the mail after your visit with us. Thank you!             Your Updated Medication List - Protect others around you: Learn how to safely use, store and throw away your medicines at www.disposemymeds.org.          This list is accurate as of: 8/21/17  3:02 PM.  Always use your most recent med list.                   Brand Name Dispense Instructions for use Diagnosis    acetaminophen 325 MG tablet    TYLENOL    100 tablet    Take 2 tablets (650 mg) by mouth every 6 hours    Acute post-operative pain       albuterol 108 (90 BASE) MCG/ACT Inhaler   Generic drug:  albuterol      Inhale 2 puffs into the lungs every 4 hours as needed for shortness of breath / dyspnea or wheezing        allopurinol 100 MG tablet    ZYLOPRIM    30 tablet    Take 1 tablet (100 mg) by mouth daily    Acute idiopathic gout, unspecified site       aspirin 81 MG chewable tablet     36 tablet    1 tablet (81 mg) by Oral or Feeding Tube route daily    LVAD (left ventricular assist device) present (H)       BREO ELLIPTA 200-25 MCG/INH oral inhaler   Generic drug:  fluticasone-vilanterol      Inhale 1 puff into the lungs daily        bumetanide 2 MG tablet    BUMEX    60 tablet    Take 1 tablet (2 mg) by mouth daily    LVAD (left ventricular assist device) present (H)       celeXA 20 MG tablet   Generic drug:  citalopram      Take 20 mg by mouth daily        COLCHICINE PO      Take by mouth daily        cyanocobalamin 1000 MCG/ML injection    VITAMIN B12     Inject 1,000 mcg into the muscle every 30 days        HYDROmorphone 2 MG tablet    DILAUDID    45 tablet    Take 1 tablet (2 mg) by mouth every 4 hours as needed for moderate to severe pain    Acute post-operative pain       INCRUSE ELLIPTA 62.5 MCG/INH oral inhaler   Generic drug:  umeclidinium      Inhale 1 puff into the lungs daily        ipratropium - albuterol 0.5 mg/2.5 mg/3 mL 0.5-2.5 (3) MG/3ML neb solution    DUONEB     Take 3 mLs by nebulization every 4 hours as needed for  shortness of breath / dyspnea or wheezing        losartan 25 MG tablet    COZAAR    60 tablet    Take 1 tablet (25 mg) by mouth daily    LVAD (left ventricular assist device) present (H), Chronic systolic congestive heart failure (H)       metFORMIN 500 MG tablet    GLUCOPHAGE    60 tablet    Take 0.5 tablets (250 mg) by mouth 2 times daily (with meals)    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, without long-term current use of insulin, unspecified CKD stage (H)       metoprolol 25 MG 24 hr tablet    TOPROL-XL    180 tablet    Take 1 tablet (25 mg) by mouth 2 times daily    Chronic systolic congestive heart failure (H), LVAD (left ventricular assist device) present (H)       pantoprazole 40 MG EC tablet    PROTONIX    30 tablet    Take 1 tablet (40 mg) by mouth every morning    Acute post-operative pain       polyethylene glycol Packet    MIRALAX/GLYCOLAX    7 packet    Take 17 g by mouth daily as needed for constipation (for no bm in 1 day)    Acute post-operative pain       predniSONE 20 MG tablet    DELTASONE    20 tablet    Take 1 tablet (20 mg) by mouth daily    Acute idiopathic gout, unspecified site       senna-docusate 8.6-50 MG per tablet    SENOKOT-S;PERICOLACE    100 tablet    Take 2 tablets by mouth 2 times daily as needed for constipation    Acute post-operative pain       SINGULAIR 10 MG tablet   Generic drug:  montelukast      Take 10 mg by mouth At Bedtime        spironolactone 25 MG tablet    ALDACTONE    30 tablet    Take 0.5 tablets (12.5 mg) by mouth daily    LVAD (left ventricular assist device) present (H), Chronic systolic congestive heart failure (H)       traMADol 50 MG tablet    ULTRAM    28 tablet    Take 2 tablets (100 mg) by mouth every 6 hours as needed for moderate pain or breakthrough pain    Acute post-operative pain       traZODone 50 MG tablet    DESYREL    11 tablet    Take 50 mg by mouth nightly x one week then reduce to 25 mg (1/2 tab) by mouth  nightly x one week then stop.    Acute post-operative pain, LVAD (left ventricular assist device) present (H)       warfarin 3 MG tablet    COUMADIN    30 tablet    Use as directed.    LVAD (left ventricular assist device) present (H)       zinc sulfate 220 (50 ZN) MG capsule    ZINCATE     Take 220 mg by mouth 2 times daily

## 2017-08-28 ENCOUNTER — CARE COORDINATION (OUTPATIENT)
Dept: CARDIOLOGY | Facility: CLINIC | Age: 62
End: 2017-08-28

## 2017-08-28 ENCOUNTER — ANTICOAGULATION THERAPY VISIT (OUTPATIENT)
Dept: ANTICOAGULATION | Facility: CLINIC | Age: 62
End: 2017-08-28

## 2017-08-28 DIAGNOSIS — Z79.01 LONG-TERM (CURRENT) USE OF ANTICOAGULANTS: ICD-10-CM

## 2017-08-28 DIAGNOSIS — Z95.811 LVAD (LEFT VENTRICULAR ASSIST DEVICE) PRESENT (H): ICD-10-CM

## 2017-08-28 DIAGNOSIS — I50.22 CHRONIC SYSTOLIC CONGESTIVE HEART FAILURE (H): ICD-10-CM

## 2017-08-28 LAB — INR PPP: 1.3 (ref 0.86–1.14)

## 2017-08-28 PROCEDURE — 36415 COLL VENOUS BLD VENIPUNCTURE: CPT | Performed by: FAMILY MEDICINE

## 2017-08-28 PROCEDURE — 85610 PROTHROMBIN TIME: CPT | Performed by: FAMILY MEDICINE

## 2017-08-28 RX ORDER — SPIRONOLACTONE 25 MG/1
25 TABLET ORAL DAILY
Qty: 30 TABLET | Refills: 5 | COMMUNITY
Start: 2017-08-28 | End: 2018-04-11

## 2017-08-28 RX ORDER — BUMETANIDE 1 MG/1
1 TABLET ORAL DAILY
COMMUNITY
Start: 2017-08-28 | End: 2017-09-05

## 2017-08-28 NOTE — MR AVS SNAPSHOT
Jim Willingham   8/28/2017   Anticoagulation Therapy Visit    Description:  62 year old male   Provider:  Maged Bedolla, [facility]   Department:  [facility]           INR as of 8/28/2017     Today's INR 1.30!      Anticoagulation Summary as of 8/28/2017     INR goal 2.0-3.0   Today's INR 1.30!   Full instructions 8/28: 10 mg; 8/29: 7.5 mg   Next INR check 8/30/2017    Indications   LVAD (left ventricular assist device) present (H) [Z95.811]  Long-term (current) use of anticoagulants [Z79.01] [Z79.01]         August 2017 Details    Sun Mon Tue Wed Thu Fri Sat       1               2               3               4               5                 6               7               8               9               10               11               12                 13               14               15               16               17               18               19                 20               21               22               23               24               25               26                 27               28      10 mg   See details      29      7.5 mg         30            31                  Date Details   08/28 This INR check       Date of next INR:  8/30/2017         How to take your warfarin dose     To take:  7.5 mg Take 7.5 of the 1 mg tablets.    To take:  10 mg Take 10 of the 1 mg tablets.

## 2017-08-28 NOTE — PROGRESS NOTES
Reviewed lab draw from last with BARRIE Esposito, and was given instructions to increase Spironolactone to 25mg daily and decrease Bumex to 1mg daily. Called pt to relay instructions, pt verbalized understanding. Will recheck labs at next clinic visit 9/15/17.

## 2017-08-28 NOTE — PROGRESS NOTES
ANTICOAGULATION FOLLOW-UP CLINIC VISIT    Patient Name:  Jim Willingham  Date:  8/28/2017  Contact Type:  Telephone    SUBJECTIVE:     Patient Findings     Positives Unexplained INR or factor level change           OBJECTIVE    INR   Date Value Ref Range Status   08/28/2017 1.30 (H) 0.86 - 1.14 Final       ASSESSMENT / PLAN  INR assessment SUB    Recheck INR In: 2 DAYS    INR Location Clinic      Anticoagulation Summary as of 8/28/2017     INR goal 2.0-3.0   Today's INR 1.30!   Maintenance plan No maintenance plan   Full instructions 8/28: 10 mg; 8/29: 7.5 mg   Plan last modified Maged Bedolla [facility] (8/28/2017)   Next INR check 8/30/2017   Priority INR   Target end date     Indications   LVAD (left ventricular assist device) present (H) [Z95.811]  Long-term (current) use of anticoagulants [Z79.01] [Z79.01]         Anticoagulation Episode Summary     INR check location     Preferred lab     Send INR reminders to [facility]    Comments HIPPA Forms mailed 6/26/17  Labs drawn either at [facility] or [facility]      Anticoagulation Care Providers     Provider Role Specialty Phone number    Delisa Montgomery MD Responsible Cardiology [phone]            See the Encounter Report to view Anticoagulation Flowsheet and Dosing Calendar (Go to Encounters tab in chart review, and find the Anticoagulation Therapy Visit)    Spoke with Jim he understands that he will increase his warfarin dose to 10mgs today and 7.5mgs tomorrow. He will increase his Aspirin to 325mgs daily. He will start 110mgs Enoxaparin subq every 12 hours tonight.  LVAD cochery David RN spoke with Dr Montgomery to confirm this recommendation    Maged Bedolla [facility]

## 2017-08-30 ENCOUNTER — ANTICOAGULATION THERAPY VISIT (OUTPATIENT)
Dept: ANTICOAGULATION | Facility: CLINIC | Age: 62
End: 2017-08-30

## 2017-08-30 DIAGNOSIS — Z79.01 LONG-TERM (CURRENT) USE OF ANTICOAGULANTS: ICD-10-CM

## 2017-08-30 DIAGNOSIS — Z95.811 LVAD (LEFT VENTRICULAR ASSIST DEVICE) PRESENT (H): ICD-10-CM

## 2017-08-30 LAB — INR PPP: 1.74 (ref 0.86–1.14)

## 2017-08-30 PROCEDURE — 85610 PROTHROMBIN TIME: CPT | Performed by: FAMILY MEDICINE

## 2017-08-30 PROCEDURE — 36415 COLL VENOUS BLD VENIPUNCTURE: CPT | Performed by: FAMILY MEDICINE

## 2017-08-30 NOTE — PROGRESS NOTES
ANTICOAGULATION FOLLOW-UP CLINIC VISIT    Patient Name:  Jim Willingham  Date:  8/30/2017  Contact Type:  Telephone    SUBJECTIVE:     Patient Findings     Positives Unexplained INR or factor level change    Comments Patient will continue Lovenox every 12 hours.  Continue with 325mg Aspirin.           OBJECTIVE    INR   Date Value Ref Range Status   08/30/2017 1.74 (H) 0.86 - 1.14 Final       ASSESSMENT / PLAN  INR assessment SUB    Recheck INR In: 2 DAYS    INR Location Clinic      Anticoagulation Summary as of 8/30/2017     INR goal 2.0-3.0   Today's INR 1.74!   Maintenance plan No maintenance plan   Full instructions 8/30: 7.5 mg; 8/31: 7.5 mg   Plan last modified Maged Bedolla, [facility] (8/28/2017)   Next INR check 9/1/2017   Priority INR   Target end date     Indications   LVAD (left ventricular assist device) present (H) [Z95.811]  Long-term (current) use of anticoagulants [Z79.01] [Z79.01]         Anticoagulation Episode Summary     INR check location     Preferred lab     Send INR reminders to [facility]    Comments HIPPA Forms mailed 6/26/17  Labs drawn either at [facility] or [facility]      Anticoagulation Care Providers     Provider Role Specialty Phone number    Delisa Montgomery MD Responsible Cardiology [phone]            See the Encounter Report to view Anticoagulation Flowsheet and Dosing Calendar (Go to Encounters tab in chart review, and find the Anticoagulation Therapy Visit)    Spoke to Jim.  He was unable to explain the subtherapeutic result today.  Has not missed doses, and has not missed any Lovenox injections.  No change in diet, medication or health.  Will resume 325mg aspirin.  Adjusted warfarin to 7.5mg today and tomorrow.  Recheck on Friday.    Brendan Montoya RN

## 2017-08-30 NOTE — MR AVS SNAPSHOT
Jim Willingham   8/30/2017   Anticoagulation Therapy Visit    Description:  62 year old male   Provider:  Brendan Montoya RN   Department:  [facility]           INR as of 8/30/2017     Today's INR 1.74!      Anticoagulation Summary as of 8/30/2017     INR goal 2.0-3.0   Today's INR 1.74!   Full instructions 8/30: 7.5 mg; 8/31: 7.5 mg   Next INR check 9/1/2017    Indications   LVAD (left ventricular assist device) present (H) [Z95.811]  Long-term (current) use of anticoagulants [Z79.01] [Z79.01]         August 2017 Details    Sun Mon Tue Wed Thu Fri Sat       1               2               3               4               5                 6               7               8               9               10               11               12                 13               14               15               16               17               18               19                 20               21               22               23               24               25               26                 27               28               29               30      7.5 mg   See details      31      7.5 mg            Date Details   08/30 This INR check               How to take your warfarin dose     To take:  7.5 mg Take 2.5 of the 3 mg tablets.           September 2017 Details    Sun Mon Tue Wed Thu Fri Sat          1            2                 3               4               5               6               7               8               9                 10               11               12               13               14               15               16                 17               18               19               20               21               22               23                 24               25               26               27               28               29               30                Date Details   No additional details    Date of next INR:  9/1/2017

## 2017-09-01 ENCOUNTER — ANTICOAGULATION THERAPY VISIT (OUTPATIENT)
Dept: ANTICOAGULATION | Facility: CLINIC | Age: 62
End: 2017-09-01

## 2017-09-01 ENCOUNTER — CARE COORDINATION (OUTPATIENT)
Dept: CARDIOLOGY | Facility: CLINIC | Age: 62
End: 2017-09-01

## 2017-09-01 DIAGNOSIS — Z95.811 LVAD (LEFT VENTRICULAR ASSIST DEVICE) PRESENT (H): ICD-10-CM

## 2017-09-01 DIAGNOSIS — Z79.01 LONG-TERM (CURRENT) USE OF ANTICOAGULANTS: ICD-10-CM

## 2017-09-01 LAB — INR PPP: 2.22 (ref 0.86–1.14)

## 2017-09-01 PROCEDURE — 36415 COLL VENOUS BLD VENIPUNCTURE: CPT | Performed by: INTERNAL MEDICINE

## 2017-09-01 PROCEDURE — 85610 PROTHROMBIN TIME: CPT | Performed by: INTERNAL MEDICINE

## 2017-09-01 NOTE — PROGRESS NOTES
ANTICOAGULATION FOLLOW-UP CLINIC VISIT    Patient Name:  Jim Willingham  Date:  9/1/2017  Contact Type:  Telephone    SUBJECTIVE:     Patient Findings     Comments Instructions to stop Lovenox 110mg BID and to decrease ASA 81mg Daily. Pt reports before his LVAD placement his Warfarin dosing was 10mg MWF/7.5mg ROW. Will have pt take 10mg MF/7.5mg SaSuTu and recheck INR on Wednesday 9/6 to see if we need to add another 10mg dose.            OBJECTIVE    INR   Date Value Ref Range Status   09/01/2017 2.22 (H) 0.86 - 1.14 Final       ASSESSMENT / PLAN  INR assessment THER    Recheck INR In: 5 DAYS    INR Location Clinic      Anticoagulation Summary as of 9/1/2017     INR goal 2.0-3.0   Today's INR 2.22   Maintenance plan No maintenance plan   Full instructions 9/1: 10 mg; 9/2: 7.5 mg; 9/3: 7.5 mg; 9/4: 10 mg; 9/5: 7.5 mg   Plan last modified Radha Pedro RN (9/1/2017)   Next INR check 9/6/2017   Priority INR   Target end date     Indications   LVAD (left ventricular assist device) present (H) [Z95.811]  Long-term (current) use of anticoagulants [Z79.01] [Z79.01]         Anticoagulation Episode Summary     INR check location     Preferred lab     Send INR reminders to [facility]    Comments HIPPA Forms mailed 6/26/17  Labs drawn either at [facility] or [facility]      Anticoagulation Care Providers     Provider Role Specialty Phone number    Delisa Montgomery MD [facility] [phone]            See the Encounter Report to view Anticoagulation Flowsheet and Dosing Calendar (Go to Encounters tab in chart review, and find the Anticoagulation Therapy Visit)    Spoke with patient. Gave them their lab results and new warfarin recommendation.  No changes in health, medication, or diet. No missed doses, no falls. No signs or symptoms of bleed or clotting.     Radha Pedro, RN

## 2017-09-01 NOTE — PROGRESS NOTES
Called patient/caregiver to check in 8 weeks post discharge. Pt reports VAD parameters wnl and weight stable. Reviewed medications and answered any questions. Patient reports sleeping well and no anxiety since being home with LVAD. Patient is able to move around the house and care for himself independently.    Discussed specific new problems/stressors since being discharged from the hospital: none. Empathized with patient and reviewed coping strategies: enlisting support from friends and love ones, attending patient and caregiver support groups, reviewing LVAD educational materials to reinforce knowledge, and talking about concerns with family/care providers/trusted others. Encouraged pt to page VAD Coordinator with any issues or questions. Pt verbalizes understanding.

## 2017-09-01 NOTE — MR AVS SNAPSHOT
Jim Willingham   9/1/2017   Anticoagulation Therapy Visit    Description:  62 year old male   Provider:  Radha Pedro, RN   Department:  [facility]           INR as of 9/1/2017     Today's INR 2.22      Anticoagulation Summary as of 9/1/2017     INR goal 2.0-3.0   Today's INR 2.22   Full instructions 9/1: 10 mg; 9/2: 7.5 mg; 9/3: 7.5 mg; 9/4: 10 mg; 9/5: 7.5 mg   Next INR check 9/6/2017    Indications   LVAD (left ventricular assist device) present (H) [Z95.811]  Long-term (current) use of anticoagulants [Z79.01] [Z79.01]         September 2017 Details    Sun Mon Tue Wed Thu Fri Sat          1      10 mg   See details      2      7.5 mg           3      7.5 mg         4      10 mg         5      7.5 mg         6            7               8               9                 10               11               12               13               14               15               16                 17               18               19               20               21               22               23                 24               25               26               27               28               29               30                Date Details   09/01 This INR check       Date of next INR:  9/6/2017         How to take your warfarin dose     To take:  7.5 mg Take 1.5 of the 5 mg tablets.    To take:  10 mg Take 2 of the 5 mg tablets.

## 2017-09-05 ENCOUNTER — CARE COORDINATION (OUTPATIENT)
Dept: CARDIOLOGY | Facility: CLINIC | Age: 62
End: 2017-09-05

## 2017-09-05 DIAGNOSIS — Z95.811 LVAD (LEFT VENTRICULAR ASSIST DEVICE) PRESENT (H): ICD-10-CM

## 2017-09-05 RX ORDER — BUMETANIDE 1 MG/1
2 TABLET ORAL DAILY
Qty: 30 TABLET | COMMUNITY
Start: 2017-09-05 | End: 2018-03-19

## 2017-09-05 NOTE — PROGRESS NOTES
Pt called to report 6lb weight gain in 1 week. Diuretic dosing was adjusted last week. Discussed most recent labs and meds with Dr. Montgomery, and received order to increase Bumex to 2mg daily and get labs checked again on Friday, 9/8/17. Called pt to relay information, verbalized understanding.

## 2017-09-06 ENCOUNTER — ANTICOAGULATION THERAPY VISIT (OUTPATIENT)
Dept: ANTICOAGULATION | Facility: CLINIC | Age: 62
End: 2017-09-06

## 2017-09-06 DIAGNOSIS — Z95.811 LVAD (LEFT VENTRICULAR ASSIST DEVICE) PRESENT (H): ICD-10-CM

## 2017-09-06 DIAGNOSIS — Z79.01 LONG-TERM (CURRENT) USE OF ANTICOAGULANTS: ICD-10-CM

## 2017-09-06 LAB — INR PPP: 1.71 (ref 0.86–1.14)

## 2017-09-06 PROCEDURE — 36415 COLL VENOUS BLD VENIPUNCTURE: CPT | Performed by: INTERNAL MEDICINE

## 2017-09-06 PROCEDURE — 85610 PROTHROMBIN TIME: CPT | Performed by: INTERNAL MEDICINE

## 2017-09-06 NOTE — PROGRESS NOTES
ANTICOAGULATION FOLLOW-UP CLINIC VISIT    Patient Name:  Jim Willingham  Date:  9/6/2017  Contact Type:  Telephone    SUBJECTIVE:     Patient Findings     Positives Unexplained INR or factor level change    Comments Recommended patient return to 325mg Aspirin until INR is therapeutic.  Increased Warfarin to 10mg today and tomorrow.  Recheck on Friday.           OBJECTIVE    INR   Date Value Ref Range Status   09/06/2017 1.71 (H) 0.86 - 1.14 Final       ASSESSMENT / PLAN  INR assessment SUB    Recheck INR In: 2 DAYS    INR Location Clinic      Anticoagulation Summary as of 9/6/2017     INR goal 2.0-3.0   Today's INR 1.71!   Maintenance plan No maintenance plan   Full instructions 9/6: 10 mg; 9/7: 10 mg   Plan last modified Radha Pedro RN (9/1/2017)   Next INR check 9/8/2017   Priority INR   Target end date     Indications   LVAD (left ventricular assist device) present (H) [Z95.811]  Long-term (current) use of anticoagulants [Z79.01] [Z79.01]         Anticoagulation Episode Summary     INR check location     Preferred lab     Send INR reminders to [facility]    Comments HIPPA Forms mailed 6/26/17  [facility]      Anticoagulation Care Providers     Provider Role Specialty Phone number    Delisa Montgomery MD Responsible Cardiology [phone]            See the Encounter Report to view Anticoagulation Flowsheet and Dosing Calendar (Go to Encounters tab in chart review, and find the Anticoagulation Therapy Visit)    Left message for patient with results and dosing recommendations. Recommended patient increase aspirin to 325mg.  Asked patient to call back to report any missed doses, falls, signs and symptoms of bleeding or clotting, any changes in health, medication, or diet. Asked patient to call back with any questions or concerns.    Brendan Montoya, RN

## 2017-09-06 NOTE — MR AVS SNAPSHOT
Jim Willingham   9/6/2017   Anticoagulation Therapy Visit    Description:  62 year old male   Provider:  Brendan Montoya, RN   Department:  [facility]           INR as of 9/6/2017     Today's INR 1.71!      Anticoagulation Summary as of 9/6/2017     INR goal 2.0-3.0   Today's INR 1.71!   Full instructions 9/6: 10 mg; 9/7: 10 mg   Next INR check 9/8/2017    Indications   LVAD (left ventricular assist device) present (H) [Z95.811]  Long-term (current) use of anticoagulants [Z79.01] [Z79.01]         September 2017 Details    Sun Mon Tue Wed Thu Fri Sat          1               2                 3               4               5               6      10 mg   See details      7      10 mg         8            9                 10               11               12               13               14               15               16                 17               18               19               20               21               22               23                 24               25               26               27               28               29               30                Date Details   09/06 This INR check       Date of next INR:  9/8/2017         How to take your warfarin dose     To take:  10 mg Take 2 of the 5 mg tablets.

## 2017-09-08 ENCOUNTER — CARE COORDINATION (OUTPATIENT)
Dept: CARDIOLOGY | Facility: CLINIC | Age: 62
End: 2017-09-08

## 2017-09-08 ENCOUNTER — ANTICOAGULATION THERAPY VISIT (OUTPATIENT)
Dept: ANTICOAGULATION | Facility: CLINIC | Age: 62
End: 2017-09-08

## 2017-09-08 DIAGNOSIS — Z95.811 LVAD (LEFT VENTRICULAR ASSIST DEVICE) PRESENT (H): ICD-10-CM

## 2017-09-08 DIAGNOSIS — Z79.01 LONG-TERM (CURRENT) USE OF ANTICOAGULANTS: ICD-10-CM

## 2017-09-08 LAB
ANION GAP SERPL CALCULATED.3IONS-SCNC: 6 MMOL/L (ref 3–14)
BUN SERPL-MCNC: 27 MG/DL (ref 7–30)
CALCIUM SERPL-MCNC: 8.5 MG/DL (ref 8.5–10.1)
CHLORIDE SERPL-SCNC: 102 MMOL/L (ref 94–109)
CO2 SERPL-SCNC: 30 MMOL/L (ref 20–32)
CREAT SERPL-MCNC: 1.21 MG/DL (ref 0.66–1.25)
GFR SERPL CREATININE-BSD FRML MDRD: 61 ML/MIN/1.7M2
GLUCOSE SERPL-MCNC: 111 MG/DL (ref 70–99)
INR PPP: 2.09 (ref 0.86–1.14)
POTASSIUM SERPL-SCNC: 3.9 MMOL/L (ref 3.4–5.3)
SODIUM SERPL-SCNC: 138 MMOL/L (ref 133–144)

## 2017-09-08 PROCEDURE — 85610 PROTHROMBIN TIME: CPT | Performed by: INTERNAL MEDICINE

## 2017-09-08 PROCEDURE — 80048 BASIC METABOLIC PNL TOTAL CA: CPT | Performed by: INTERNAL MEDICINE

## 2017-09-08 PROCEDURE — 36415 COLL VENOUS BLD VENIPUNCTURE: CPT | Performed by: INTERNAL MEDICINE

## 2017-09-08 NOTE — MR AVS SNAPSHOT
Jim Willingham   9/8/2017   Anticoagulation Therapy Visit    Description:  62 year old male   Provider:  Kirsty Bermudez, RN   Department:  [facility]           INR as of 9/8/2017     Today's INR 2.09      Anticoagulation Summary as of 9/8/2017     INR goal 2.0-3.0   Today's INR 2.09   Full instructions 9/8: 7.5 mg; 9/9: 10 mg; 9/10: 7.5 mg   Next INR check 9/11/2017    Indications   LVAD (left ventricular assist device) present (H) [Z95.811]  Long-term (current) use of anticoagulants [Z79.01] [Z79.01]         September 2017 Details    Sun Mon Tue Wed Thu Fri Sat          1               2                 3               4               5               6               7               8      7.5 mg   See details      9      10 mg           10      7.5 mg         11            12               13               14               15               16                 17               18               19               20               21               22               23                 24               25               26               27               28               29               30                Date Details   09/08 This INR check       Date of next INR:  9/11/2017         How to take your warfarin dose     To take:  7.5 mg Take 1.5 of the 5 mg tablets.    To take:  10 mg Take 2 of the 5 mg tablets.

## 2017-09-08 NOTE — PROGRESS NOTES
Pt called back today to report 3lb weight loss since increasing diuretics on 9/5/17, however he still feels short of breath. Labs today are WNL - creatinine 1.21, K 3.9. Instructed pt to take 1mg Bumex this afternoon and tomorrow afternoon (in addition to 2mg qam) and to check back in on Monday to report on weight and breathing. Pt verbalized understanding.

## 2017-09-08 NOTE — PROGRESS NOTES
ANTICOAGULATION FOLLOW-UP CLINIC VISIT    Patient Name:  Jim Willingham  Date:  9/8/2017  Contact Type:  Telephone    SUBJECTIVE:     Patient Findings     Comments Has been drinking vitamin water (not a change)--does not have vitamin K on ingredients list  He will decrease ASA back to 81 mg daily           OBJECTIVE    INR   Date Value Ref Range Status   09/08/2017 2.09 (H) 0.86 - 1.14 Final       ASSESSMENT / PLAN  INR assessment THER    Recheck INR In: 3 DAYS    INR Location Clinic      Anticoagulation Summary as of 9/8/2017     INR goal 2.0-3.0   Today's INR 2.09   Maintenance plan No maintenance plan   Full instructions 9/8: 7.5 mg; 9/9: 10 mg; 9/10: 7.5 mg   Plan last modified Radha Pedro RN (9/1/2017)   Next INR check 9/11/2017   Priority INR   Target end date     Indications   LVAD (left ventricular assist device) present (H) [Z95.811]  Long-term (current) use of anticoagulants [Z79.01] [Z79.01]         Anticoagulation Episode Summary     INR check location     Preferred lab     Send INR reminders to [facility]    Comments HIPPA Forms mailed 6/26/17  Labs drawn either at [facility] or [facility]      Anticoagulation Care Providers     Provider Role Specialty Phone number    Delisa Montgomery MD Responsible Cardiology [phone]            See the Encounter Report to view Anticoagulation Flowsheet and Dosing Calendar (Go to Encounters tab in chart review, and find the Anticoagulation Therapy Visit)    Spoke with Jim.  He will decrease ASA back to 81 mg daily.      Kirsty Bermudez RN

## 2017-09-12 ENCOUNTER — ANTICOAGULATION THERAPY VISIT (OUTPATIENT)
Dept: ANTICOAGULATION | Facility: CLINIC | Age: 62
End: 2017-09-12

## 2017-09-12 DIAGNOSIS — Z79.01 LONG-TERM (CURRENT) USE OF ANTICOAGULANTS: ICD-10-CM

## 2017-09-12 DIAGNOSIS — Z95.811 LVAD (LEFT VENTRICULAR ASSIST DEVICE) PRESENT (H): ICD-10-CM

## 2017-09-12 LAB — INR PPP: 2.2 (ref 0.86–1.14)

## 2017-09-12 PROCEDURE — 36415 COLL VENOUS BLD VENIPUNCTURE: CPT | Performed by: INTERNAL MEDICINE

## 2017-09-12 PROCEDURE — 85610 PROTHROMBIN TIME: CPT | Performed by: INTERNAL MEDICINE

## 2017-09-12 NOTE — MR AVS SNAPSHOT
Jim Willingham   9/12/2017   Anticoagulation Therapy Visit    Description:  62 year old male   Provider:  Kirsty Bermudez, RN   Department:  [facility]           INR as of 9/12/2017     Today's INR 2.20      Anticoagulation Summary as of 9/12/2017     INR goal 2.0-3.0   Today's INR 2.20   Full instructions 9/12: 10 mg; 9/13: 7.5 mg; 9/14: 10 mg   Next INR check 9/15/2017    Indications   LVAD (left ventricular assist device) present (H) [Z95.811]  Long-term (current) use of anticoagulants [Z79.01] [Z79.01]         September 2017 Details    Sun Mon Tue Wed Thu Fri Sat          1               2                 3               4               5               6               7               8               9                 10               11               12      10 mg   See details      13      7.5 mg         14      10 mg         15            16                 17               18               19               20               21               22               23                 24               25               26               27               28               29               30                Date Details   09/12 This INR check       Date of next INR:  9/15/2017         How to take your warfarin dose     To take:  7.5 mg Take 1.5 of the 5 mg tablets.    To take:  10 mg Take 2 of the 5 mg tablets.

## 2017-09-12 NOTE — PROGRESS NOTES
ANTICOAGULATION FOLLOW-UP CLINIC VISIT    Patient Name:  Jim Willingham  Date:  9/12/2017  Contact Type:  Telephone    SUBJECTIVE:     Patient Findings     Positives No Problem Findings    Comments He had 3 days of 10 mg warfarin and 4 days of 7.5 mg.  Will try 10 mg TuThSa and 7.5 mg MWFSu.             OBJECTIVE    INR   Date Value Ref Range Status   09/12/2017 2.20 (H) 0.86 - 1.14 Final       ASSESSMENT / PLAN  INR assessment THER    Recheck INR In: 3 DAYS    INR Location Clinic      Anticoagulation Summary as of 9/12/2017     INR goal 2.0-3.0   Today's INR 2.20   Maintenance plan No maintenance plan   Full instructions 9/12: 10 mg; 9/13: 7.5 mg; 9/14: 10 mg   Plan last modified Radha Pedro, RN (9/1/2017)   Next INR check 9/15/2017   Priority INR   Target end date     Indications   LVAD (left ventricular assist device) present (H) [Z95.811]  Long-term (current) use of anticoagulants [Z79.01] [Z79.01]         Anticoagulation Episode Summary     INR check location     Preferred lab     Send INR reminders to [facility]    Comments HIPPA Forms mailed 6/26/17  Labs drawn either at [facility] or [facility]      Anticoagulation Care Providers     Provider Role Specialty Phone number    Delisa Montgomery MD Responsible Cardiology [phone]            See the Encounter Report to view Anticoagulation Flowsheet and Dosing Calendar (Go to Encounters tab in chart review, and find the Anticoagulation Therapy Visit)    Spoke with Jim.      Kirsty Bermudez RN

## 2017-09-15 ENCOUNTER — OFFICE VISIT (OUTPATIENT)
Dept: CARDIOLOGY | Facility: CLINIC | Age: 62
End: 2017-09-15
Attending: NURSE PRACTITIONER
Payer: COMMERCIAL

## 2017-09-15 ENCOUNTER — ANTICOAGULATION THERAPY VISIT (OUTPATIENT)
Dept: ANTICOAGULATION | Facility: CLINIC | Age: 62
End: 2017-09-15

## 2017-09-15 ENCOUNTER — RADIANT APPOINTMENT (OUTPATIENT)
Dept: CARDIOLOGY | Facility: CLINIC | Age: 62
End: 2017-09-15

## 2017-09-15 ENCOUNTER — CARE COORDINATION (OUTPATIENT)
Dept: CARDIOLOGY | Facility: CLINIC | Age: 62
End: 2017-09-15

## 2017-09-15 ENCOUNTER — ALLIED HEALTH/NURSE VISIT (OUTPATIENT)
Dept: CARDIOLOGY | Facility: CLINIC | Age: 62
End: 2017-09-15
Attending: INTERNAL MEDICINE
Payer: COMMERCIAL

## 2017-09-15 VITALS
HEIGHT: 68 IN | WEIGHT: 249.3 LBS | OXYGEN SATURATION: 96 % | BODY MASS INDEX: 37.78 KG/M2 | SYSTOLIC BLOOD PRESSURE: 78 MMHG | TEMPERATURE: 98 F | HEART RATE: 74 BPM

## 2017-09-15 DIAGNOSIS — I50.22 CHRONIC SYSTOLIC CONGESTIVE HEART FAILURE (H): ICD-10-CM

## 2017-09-15 DIAGNOSIS — I42.8 NICM (NONISCHEMIC CARDIOMYOPATHY) (H): Primary | ICD-10-CM

## 2017-09-15 DIAGNOSIS — Z95.811 LVAD (LEFT VENTRICULAR ASSIST DEVICE) PRESENT (H): ICD-10-CM

## 2017-09-15 DIAGNOSIS — Z95.811 LVAD (LEFT VENTRICULAR ASSIST DEVICE) PRESENT (H): Primary | ICD-10-CM

## 2017-09-15 DIAGNOSIS — Z79.01 LONG-TERM (CURRENT) USE OF ANTICOAGULANTS: ICD-10-CM

## 2017-09-15 LAB
ALBUMIN SERPL-MCNC: 3.8 G/DL (ref 3.4–5)
ALP SERPL-CCNC: 86 U/L (ref 40–150)
ALT SERPL W P-5'-P-CCNC: 25 U/L (ref 0–70)
ANION GAP SERPL CALCULATED.3IONS-SCNC: 7 MMOL/L (ref 3–14)
AST SERPL W P-5'-P-CCNC: 22 U/L (ref 0–45)
BILIRUB SERPL-MCNC: 0.9 MG/DL (ref 0.2–1.3)
BUN SERPL-MCNC: 39 MG/DL (ref 7–30)
CALCIUM SERPL-MCNC: 8.9 MG/DL (ref 8.5–10.1)
CHLORIDE SERPL-SCNC: 99 MMOL/L (ref 94–109)
CO2 SERPL-SCNC: 30 MMOL/L (ref 20–32)
CREAT SERPL-MCNC: 1.45 MG/DL (ref 0.66–1.25)
ERYTHROCYTE [DISTWIDTH] IN BLOOD BY AUTOMATED COUNT: 15.9 % (ref 10–15)
GFR SERPL CREATININE-BSD FRML MDRD: 50 ML/MIN/1.7M2
GLUCOSE SERPL-MCNC: 82 MG/DL (ref 70–99)
HCT VFR BLD AUTO: 36.2 % (ref 40–53)
HGB BLD-MCNC: 10.8 G/DL (ref 13.3–17.7)
INR PPP: 2.22 (ref 0.86–1.14)
LDH SERPL L TO P-CCNC: 370 U/L (ref 85–227)
MCH RBC QN AUTO: 25.6 PG (ref 26.5–33)
MCHC RBC AUTO-ENTMCNC: 29.8 G/DL (ref 31.5–36.5)
MCV RBC AUTO: 86 FL (ref 78–100)
PLATELET # BLD AUTO: 262 10E9/L (ref 150–450)
POTASSIUM SERPL-SCNC: 4.3 MMOL/L (ref 3.4–5.3)
PROT SERPL-MCNC: 7.3 G/DL (ref 6.8–8.8)
RBC # BLD AUTO: 4.22 10E12/L (ref 4.4–5.9)
SODIUM SERPL-SCNC: 136 MMOL/L (ref 133–144)
WBC # BLD AUTO: 9 10E9/L (ref 4–11)

## 2017-09-15 PROCEDURE — 99213 OFFICE O/P EST LOW 20 MIN: CPT | Mod: 25,ZF

## 2017-09-15 PROCEDURE — 80053 COMPREHEN METABOLIC PANEL: CPT | Performed by: NURSE PRACTITIONER

## 2017-09-15 PROCEDURE — 99214 OFFICE O/P EST MOD 30 MIN: CPT | Mod: 25 | Performed by: NURSE PRACTITIONER

## 2017-09-15 PROCEDURE — 93750 INTERROGATION VAD IN PERSON: CPT | Mod: ZF | Performed by: NURSE PRACTITIONER

## 2017-09-15 PROCEDURE — 93284 PRGRMG EVAL IMPLANTABLE DFB: CPT | Mod: ZF

## 2017-09-15 PROCEDURE — 93284 PRGRMG EVAL IMPLANTABLE DFB: CPT | Mod: 26 | Performed by: INTERNAL MEDICINE

## 2017-09-15 PROCEDURE — 83615 LACTATE (LD) (LDH) ENZYME: CPT | Performed by: NURSE PRACTITIONER

## 2017-09-15 PROCEDURE — 99215 OFFICE O/P EST HI 40 MIN: CPT | Mod: 25,ZF

## 2017-09-15 PROCEDURE — 85610 PROTHROMBIN TIME: CPT | Performed by: NURSE PRACTITIONER

## 2017-09-15 PROCEDURE — 36415 COLL VENOUS BLD VENIPUNCTURE: CPT | Performed by: NURSE PRACTITIONER

## 2017-09-15 PROCEDURE — 85027 COMPLETE CBC AUTOMATED: CPT | Performed by: NURSE PRACTITIONER

## 2017-09-15 ASSESSMENT — PAIN SCALES - GENERAL: PAINLEVEL: NO PAIN (0)

## 2017-09-15 NOTE — MR AVS SNAPSHOT
After Visit Summary   9/15/2017    Jim Willingham    MRN: [id number]           Patient Information     Date Of Birth          1955        Visit Information        Provider Department      9/15/2017 9:30 AM 1,  Cv Device [facility]        Today's Diagnoses     NICM (nonischemic cardiomyopathy) (H)/ EF 20%    -  1      Care Instructions    It was a pleasure to see you in clinic today.  Please do not hesitate to call us if you have any questions or concerns.  Please return to clinic in 3 mos for a ICD check.        Michelle Ackerman R.N.  Electrophysiology nurse specialist  [facility]  [address] [place] [id number]     Tammie Ponce  [phone] business hours    After business hours please call [phone], option #4, and ask for Job code [id number].                  Follow-ups after your visit        Follow-up notes from your care team     Discussed this visit Return in about 3 months (around 12/15/2017) for ICD check, Dr Montgomery.      Your next 10 appointments already scheduled     Sep 15, 2017 10:00 AM CDT   (Arrive by 9:45 AM)   Ventricular Assist Device with Jeanine Wilson NP   [facility] ([facility])    [address] [place] [id number] [phone]            Sep 15, 2017 11:00 AM CDT   FULL PULMONARY FUNCTION with  PFL [facility] Pulmonary Function Testing ([facility])    [address] [place] [id number] [phone]            Sep 15, 2017  1:30 PM CDT   Ech Complete Lvad* with ECHCR1    [facility] ([facility])    [address] [place] [id number] [phone]           1.  Please bring or wear a comfortable two-piece outfit. 2.  You may eat, drink and take your normal medicines. 3.  For any questions that cannot be answered, please contact the ordering  "physician            Dec 19, 2017 10:30 AM CST   Lab with UC LAB   [facility] ([facility])    [address]  1st Floor  [facility] [id number] [phone]            Dec 19, 2017 11:00 AM CST   (Arrive by 10:45 AM)   Implanted Defibulator with Uc Cv Device 1   [facility] ([facility])    [address]  3rd [facility] [id number] [phone]            Dec 19, 2017 11:30 AM CST   (Arrive by 11:15 AM)   Ventricular Assist Device with Delisa Montgomery MD   [facility] ([facility])    [address]  [address]rd [facility] [id number] [phone]              Who to contact     If you have questions or need follow up information about today's clinic visit or your schedule please contact [facility] directly at [phone].  Normal or non-critical lab and imaging results will be communicated to you by [employer], letter or phone within 4 business days after the clinic has received the results. If you do not hear from us within 7 days, please contact the clinic through [employer] or phone. If you have a critical or abnormal lab result, we will notify you by phone as soon as possible.  Submit refill requests through [email or web address] or call your pharmacy and they will forward the refill request to us. Please allow 3 business days for your refill to be completed.          Additional Information About Your Visit        [employer] Information     [email or web address] lets you send messages to your doctor, view your test results, renew your prescriptions, schedule appointments and more. To sign up, go to [email or web address] . Click on \"Log in\" on the left side of the screen, which will take you to the Welcome page. Then click on \"Sign up Now\" on the right side of the page.     You will be asked to enter the access code listed below, as well as some personal information. Please follow the directions to " create your username and password.     Your access code is: [email or web address]  Expires: 2017 11:18 AM     Your access code will  in 90 days. If you need help or a new code, please call your [facility] or [phone].        Care EveryWhere ID     This is your Care EveryWhere ID. This could be used by other organizations to access your [facility] medical records  [id number]         Blood Pressure from Last 3 Encounters:   17 (!) 74/0   17 (!) 82/0   17 (!) 82/0    Weight from Last 3 Encounters:   17 111.9 kg (246 lb 12.8 oz)   17 109.1 kg (240 lb 8 oz)   17 110.9 kg (244 lb 9.6 oz)              We Performed the Following     ICD DEVICE PROGRAMMING EVAL, MULTI LEAD ICD        Primary Care Provider Office Phone # Fax #    Jovany Salas [phone]       [address] [address]        Equal Access to Services     [facility]: Hadii aad ku hadasho Soomaali, waaxda luqadaha, qaybta kaalmada adeegyada, kendra salmon haynirn lynnette marquez . So [facility] [phone].    ATENCIÓN: Si habla español, tiene a roger disposición servicios gratuitos de asistencia lingüística. Llame al [phone].    We comply with applicable federal civil rights laws and [place] laws. We do not discriminate on the basis of race, color, national origin, age, disability sex, sexual orientation or gender identity.            Thank you!     Thank you for choosing [facility]  for your care. Our goal is always to provide you with excellent care. Hearing back from our patients is one way we can continue to improve our services. Please take a few minutes to complete the written survey that you may receive in the mail after your visit with us. Thank you!             Your Updated Medication List - Protect others around you: Learn how to safely use, store and throw away your medicines at [email or web address].          This list is accurate as  of: 9/15/17  9:57 AM.  Always use your most recent med list.                   Brand Name Dispense Instructions for use Diagnosis    acetaminophen 325 MG tablet    TYLENOL    100 tablet    Take 2 tablets (650 mg) by mouth every 6 hours    Acute post-operative pain       ALDACTONE 25 MG tablet   Generic drug:  spironolactone     30 tablet    Take 1 tablet (25 mg) by mouth daily    LVAD (left ventricular assist device) present (H), Chronic systolic congestive heart failure (H)       allopurinol 100 MG tablet    ZYLOPRIM    30 tablet    Take 1 tablet (100 mg) by mouth daily    Acute idiopathic gout, unspecified site       aspirin 81 MG chewable tablet     36 tablet    1 tablet (81 mg) by Oral or Feeding Tube route daily    LVAD (left ventricular assist device) present (H)       BREO ELLIPTA 200-25 MCG/INH oral inhaler   Generic drug:  fluticasone-vilanterol      Inhale 1 puff into the lungs daily        bumetanide 1 MG tablet    BUMEX    30 tablet    Take 2 tablets (2 mg) by mouth daily    LVAD (left ventricular assist device) present (H)       celeXA 20 MG tablet   Generic drug:  citalopram      Take 20 mg by mouth daily        COLCHICINE PO      Take by mouth daily        cyanocobalamin 1000 MCG/ML injection    VITAMIN B12     Inject 1,000 mcg into the muscle every 30 days        enoxaparin 120 MG/0.8ML injection   Generic drug:  enoxaparin     10 Syringe    Inject 110mgs subq every 12 hours as directed by the Anticoagulation Clinic        HYDROmorphone 2 MG tablet    DILAUDID    45 tablet    Take 1 tablet (2 mg) by mouth every 4 hours as needed for moderate to severe pain    Acute post-operative pain       INCRUSE ELLIPTA 62.5 MCG/INH oral inhaler   Generic drug:  umeclidinium      Inhale 1 puff into the lungs daily        ipratropium - albuterol 0.5 mg/2.5 mg/3 mL 0.5-2.5 (3) MG/3ML neb solution    DUONEB     Take 3 mLs by nebulization every 4 hours as needed for shortness of breath / dyspnea or wheezing         losartan 25 MG tablet    COZAAR    60 tablet    Take 1 tablet (25 mg) by mouth daily    LVAD (left ventricular assist device) present (H), Chronic systolic congestive heart failure (H)       metFORMIN 500 MG tablet    GLUCOPHAGE    60 tablet    Take 0.5 tablets (250 mg) by mouth 2 times daily (with meals)    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, without long-term current use of insulin, unspecified CKD stage (H)       metoprolol 25 MG 24 hr tablet    TOPROL-XL    180 tablet    Take 1 tablet (25 mg) by mouth 2 times daily    Chronic systolic congestive heart failure (H), LVAD (left ventricular assist device) present (H)       pantoprazole 40 MG EC tablet    PROTONIX    30 tablet    Take 1 tablet (40 mg) by mouth every morning    Acute post-operative pain       polyethylene glycol Packet    MIRALAX/GLYCOLAX    7 packet    Take 17 g by mouth daily as needed for constipation (for no bm in 1 day)    Acute post-operative pain       predniSONE 20 MG tablet    DELTASONE    20 tablet    Take 1 tablet (20 mg) by mouth daily    Acute idiopathic gout, unspecified site       PROAIR  (90 BASE) MCG/ACT Inhaler   Generic drug:  albuterol      Inhale 2 puffs into the lungs every 4 hours as needed for shortness of breath / dyspnea or wheezing        senna-docusate 8.6-50 MG per tablet    SENOKOT-S;PERICOLACE    100 tablet    Take 2 tablets by mouth 2 times daily as needed for constipation    Acute post-operative pain       SINGULAIR 10 MG tablet   Generic drug:  montelukast      Take 10 mg by mouth At Bedtime        traMADol 50 MG tablet    ULTRAM    28 tablet    Take 2 tablets (100 mg) by mouth every 6 hours as needed for moderate pain or breakthrough pain    Acute post-operative pain       traZODone 50 MG tablet    DESYREL    11 tablet    Take 50 mg by mouth nightly x one week then reduce to 25 mg (1/2 tab) by mouth nightly x one week then stop.    Acute post-operative pain, LVAD (left  ventricular assist device) present (H)       warfarin 3 MG tablet    COUMADIN    30 tablet    Use as directed.    LVAD (left ventricular assist device) present (H)       zinc sulfate 220 (50 ZN) MG capsule    ZINCATE     Take 220 mg by mouth 2 times daily

## 2017-09-15 NOTE — MR AVS SNAPSHOT
Jim Willingham   9/15/2017   Anticoagulation Therapy Visit    Description:  62 year old male   Provider:  Brendan Montoya, RN   Department:  [facility]           INR as of 9/15/2017     Today's INR 2.22      Anticoagulation Summary as of 9/15/2017     INR goal 2.0-3.0   Today's INR 2.22   Full instructions 10 mg on Tue, Thu, Sat; 7.5 mg all other days   Next INR check     Indications   LVAD (left ventricular assist device) present (H) [Z95.811]  Long-term (current) use of anticoagulants [Z79.01] [Z79.01]         September 2017 Details    Sun Mon Tue Wed Thu Fri Sat          1               2                 3               4               5               6               7               8               9                 10               11               12               13               14               15      7.5 mg   See details      16      10 mg           17      7.5 mg         18      7.5 mg         19      10 mg         20      7.5 mg         21      10 mg         22      7.5 mg         23      10 mg           24      7.5 mg         25      7.5 mg         26      10 mg         27      7.5 mg         28      10 mg         29      7.5 mg         30      10 mg          Date Details   09/15 This INR check      Date of next INR: No date specified         How to take your warfarin dose     To take:  7.5 mg Take 1.5 of the 5 mg tablets.    To take:  10 mg Take 2 of the 5 mg tablets.

## 2017-09-15 NOTE — NURSING NOTE
Chief Complaint   Patient presents with     Follow Up For     VAD     Vitals were taken and medications were reconciled.     Luis E Hernandez MA  9:38 AM

## 2017-09-15 NOTE — PROGRESS NOTES
HPI:   Jim Willingham is a 62 year old male with a past medical history of NICM (EF 20%) s/p HM II LVAD placement (6/19/17) complicated by arrhythmias, WALTER, and small left pleural effusion, CKD Stage III, DM Type II, CECILIA, obesity, HTN, COPD, and asthma who is here for heart failure and LVAD follow up.   He is doing well from a functional view point and reports improved exertional tolerance. He denies any fever, chills, palpitations or chest pain. He seems to have recovered well after LVAD implant surgery.        PAST MEDICAL HISTORY:   Past Medical History         Past Medical History:   Diagnosis Date     Benign essential hypertension 5/11/2017     Cardiomyopathy, unspecified (H) 5/8/2017     CKD (chronic kidney disease) stage 3, GFR 30-59 ml/min 5/11/2017     Depression 5/11/2017     Diabetes mellitus (H) 5/11/2017     H/O gastric bypass 5/11/2017     ICD (implantable cardioverter-defibrillator), biventricular, in situ 5/11/2017     NICM (nonischemic cardiomyopathy) (H)/ EF 20% 5/11/2017     ECHO: LVEDd. 7.66 cm, Restrictive pattern , Severe mitral valve regurgitation     CECILIA (obstructive sleep apnea) 5/11/2017     Paroxysmal atrial fibrillation (H) 5/11/2017     Paroxysmal VT (H) 5/11/2017     Vitamin B12 deficiency (non anemic) 5/11/2017            FAMILY HISTORY:   Family History          Family History   Problem Relation Age of Onset     CEREBROVASCULAR DISEASE Mother       DIABETES Mother       Hypertension Mother       Coronary Artery Disease Father       Type 2 Diabetes Father           SOCIAL HISTORY:  Social History            Social History     Marital status:        Spouse name: N/A     Number of children: N/A     Years of education: N/A            Social History Main Topics     Smoking status: Former Smoker       Quit date: 1995     Smokeless tobacco: Not on file     Alcohol use No     Drug use: Not on file     Sexual activity: Yes       Partners: Female           Other Topics Concern      Parent/Sibling W/ Cabg, Mi Or Angioplasty Before 65f 55m? No      Social History Narrative         CURRENT MEDICATIONS:     Current Outpatient Prescriptions on File Prior to Visit:  bumetanide (BUMEX) 1 MG tablet Take 2 tablets (2 mg) by mouth daily   spironolactone (ALDACTONE) 25 MG tablet Take 1 tablet (25 mg) by mouth daily   allopurinol (ZYLOPRIM) 100 MG tablet Take 1 tablet (100 mg) by mouth daily   losartan (COZAAR) 25 MG tablet Take 1 tablet (25 mg) by mouth daily   pantoprazole (PROTONIX) 40 MG EC tablet Take 1 tablet (40 mg) by mouth every morning   metoprolol (TOPROL-XL) 25 MG 24 hr tablet Take 1 tablet (25 mg) by mouth 2 times daily   aspirin 81 MG chewable tablet 1 tablet (81 mg) by Oral or Feeding Tube route daily   metFORMIN (GLUCOPHAGE) 500 MG tablet Take 0.5 tablets (250 mg) by mouth 2 times daily (with meals)   warfarin (COUMADIN) 3 MG tablet Use as directed.   traMADol (ULTRAM) 50 MG tablet Take 2 tablets (100 mg) by mouth every 6 hours as needed for moderate pain or breakthrough pain   acetaminophen (TYLENOL) 325 MG tablet Take 2 tablets (650 mg) by mouth every 6 hours   albuterol (ALBUTEROL) 108 (90 BASE) MCG/ACT Inhaler Inhale 2 puffs into the lungs every 4 hours as needed for shortness of breath / dyspnea or wheezing   ipratropium - albuterol 0.5 mg/2.5 mg/3 mL (DUONEB) 0.5-2.5 (3) MG/3ML neb solution Take 3 mLs by nebulization every 4 hours as needed for shortness of breath / dyspnea or wheezing   citalopram (CELEXA) 20 MG tablet Take 20 mg by mouth daily   cyanocobalamin (VITAMIN B12) 1000 MCG/ML injection Inject 1,000 mcg into the muscle every 30 days   fluticasone-vilanterol (BREO ELLIPTA) 200-25 MCG/INH oral inhaler Inhale 1 puff into the lungs daily   montelukast (SINGULAIR) 10 MG tablet Take 10 mg by mouth At Bedtime   umeclidinium (INCRUSE ELLIPTA) 62.5 MCG/INH oral inhaler Inhale 1 puff into the lungs daily   zinc sulfate (ZINCATE) 220 (50 ZN) MG capsule Take 220 mg by mouth 2 times  "daily   enoxaparin (ENOXAPARIN) 120 MG/0.8ML injection Inject 110mgs subq every 12 hours as directed by the Anticoagulation Clinic   COLCHICINE PO Take by mouth daily   predniSONE (DELTASONE) 20 MG tablet Take 1 tablet (20 mg) by mouth daily (Patient not taking: Reported on 8/11/2017)   senna-docusate (SENOKOT-S;PERICOLACE) 8.6-50 MG per tablet Take 2 tablets by mouth 2 times daily as needed for constipation (Patient not taking: Reported on 9/15/2017)   traZODone (DESYREL) 50 MG tablet Take 50 mg by mouth nightly x one week then reduce to 25 mg (1/2 tab) by mouth nightly x one week then stop. (Patient not taking: Reported on 8/11/2017)   HYDROmorphone (DILAUDID) 2 MG tablet Take 1 tablet (2 mg) by mouth every 4 hours as needed for moderate to severe pain (Patient not taking: Reported on 7/25/2017)   polyethylene glycol (MIRALAX/GLYCOLAX) Packet Take 17 g by mouth daily as needed for constipation (for no bm in 1 day) (Patient not taking: Reported on 9/15/2017)      No current facility-administered medications on file prior to visit.      ROS:   Constitutional: No fever, chills, or sweats. No weight gain/loss.   ENT: No visual disturbance, ear ache, epistaxis, sore throat.   Allergies/Immunologic: Negative.   Respiratory: No cough, hemoptysis.   Cardiovascular: As per HPI.   GI: No nausea, vomiting, hematemesis, melena, or hematochezia.   : No urinary frequency, dysuria, or hematuria.   Integument: Negative.   Psychiatric: Negative.   Neuro: Negative.   Endocrinology: Negative.   Musculoskeletal: Negative.     EXAM:  BP (!) 78/0 (BP Location: Right arm, Patient Position: Chair, Cuff Size: Adult Regular)  Pulse 74  Temp 98  F (36.7  C)  Ht 1.727 m (5' 8\")  Wt 113.1 kg (249 lb 4.8 oz)  SpO2 96%  BMI 37.91 kg/m2  General: appears comfortable, alert and articulate  Head: normocephalic, atraumatic  Eyes: anicteric sclera, EOMI  Neck: no adenopathy, neck supple  Orophyarynx: moist mucosa, no lesions, dentition " intact  Heart: LVAD hum present, no murmur, gallop, rub.  No appreciable JVD.  Lungs: clear, no rales or wheezing  Abdomen: soft, non-tender, bowel sounds present, no hepatomegaly.  LVAD driveline site CDI.  Extremities: no clubbing, cyanosis or edema.  No palpable peripheral pulses.   Neurological: normal speech and affect, no gross motor deficits          Labs:  Reviewed CBC, CMP, LDH, INR,      Most recent echocardiogram:  ECHO LVAD RAMP 8/11/17  Interpretation Summary  LVAD optimization study.     9800 RPM LVIDd 6.4 cm, LVIDs 6.2 cm AV remains closed, mild MR, septum closer  to inflow cannula  9600 RPM LVIDd 6.69 cm, LVIDs 6.28, AV opens intermittently, mild MR, septum  closer to inflow cannula  9400 rpm LVIDd 6.68 cm, LVIDs 6.32 cm, AV opens partially every other  beat,mild to moderate MR, septum neutral  9000 RPM.LVIDd 6.87, LVIDs 6.31 cm, AV opens every other beat, mild to  moderate MR,septum neutral, normal inflow velocity, outflow velocity cannot be  assessed.  8800 rpm LVIDd 6.8 cm, LVIDs 6.22 cm, AV opens every beat , moderate MR,  septum neutral     Estimated mean right atrial pressure is 15 mmHg.  The inferior vena cava was dilated at 2.98 cm without respiratory variability,  consistent with increased right atrial pressure.  Global right ventricular function is moderately reduced.  No pericardial effusion is present.     Assessment and Plan:    Jim Willingham is a 62 year old male with a past medical history of NICM (EF 20%) s/p HM II LVAD placement who is doing well from a functional view point. I have instructed him to do meticulous driveline dressing changes and to let us know if there is any evidence of driveline infection. I am overall pleased with his progress and recommend continued medical management.

## 2017-09-15 NOTE — PATIENT INSTRUCTIONS
It was a pleasure to see you in clinic today.  Please do not hesitate to call us if you have any questions or concerns.  Please return to clinic in 3 mos for a ICD check.        Michelle Ackerman R.N.  Electrophysiology nurse specialist  [facility]  [address] [facility] [address]     Tammie Ponce  [phone] business hours    After business hours please call [phone], option #4, and ask for Job code [id number].

## 2017-09-15 NOTE — PROGRESS NOTES
Pt seen in clinic for evaluation and iterative programming of his Medtronic CRT-D per MD order.  He looks well and he states that he feels well and he does not offer any complaints, he has a routine follow up with Jeanine Wilson NP today.  WALLY parrish states that he was golfing on 9/11/17 and his buddies said he passed out on the golf course for about 2 sec, he was sitting in the golf cart and he slid down a little and when he opened his eyes everyone was looking at him.  He states that it was very hot that day and he failed to eat that morning.  His ICD check shows 14 NSVT episodes, none of which correspond with his episode.  His NSVT episodes 1-2 sec, 179-214 bpm and 15 SVT episodes 154-162 bpm. He has had 1656 ventricular sensed event since 7/12/17.  His optivol trend is re-establishing a baseline post LVAD, he Bi V paces 94% of the time and he does have frequent PVC's, his PVC counters show 58/ hr but it according to his ECG he has many more than that.  He does not feel them.  His heart rate histograms show good heart rate variation and his ICD battery estimates 5.3 years left.  We will plan to see him back in clinic in December when he comes to see Dr Montgomery.    Multi ICD

## 2017-09-15 NOTE — PROGRESS NOTES
ANTICOAGULATION FOLLOW-UP CLINIC VISIT    Patient Name:  Jim Willingham  Date:  9/15/2017  Contact Type:  Telephone    SUBJECTIVE:     Patient Findings     Comments Recommended patient take 10mg of Coumadin on Tues, Thurs, and Saturday.  7.5mg of Coumadin all other days.  Attempting to get Jim on a consistent dosing schedule. Check INR next Tuesday.           OBJECTIVE    INR   Date Value Ref Range Status   09/15/2017 2.22 (H) 0.86 - 1.14 Final       ASSESSMENT / PLAN  INR assessment THER    Recheck INR In: 4 DAYS    INR Location Clinic      Anticoagulation Summary as of 9/15/2017     INR goal 2.0-3.0   Today's INR 2.22   Maintenance plan 10 mg (5 mg x 2) on Tue, Thu, Sat; 7.5 mg (5 mg x 1.5) all other days   Full instructions 10 mg on Tue, Thu, Sat; 7.5 mg all other days   Weekly total 60 mg   Plan last modified Brendan Montoya RN (9/15/2017)   Next INR check    Priority INR   Target end date     Indications   LVAD (left ventricular assist device) present (H) [Z95.811]  Long-term (current) use of anticoagulants [Z79.01] [Z79.01]         Anticoagulation Episode Summary     INR check location     Preferred lab     Send INR reminders to [facility]    Comments HIPPA Forms mailed 6/26/17  Labs drawn either at [facility] or [facility] [place] [facility]      Anticoagulation Care Providers     Provider Role Specialty Phone number    Delisa Montgomery MD Responsible Cardiology [phone]            See the Encounter Report to view Anticoagulation Flowsheet and Dosing Calendar (Go to Encounters tab in chart review, and find the Anticoagulation Therapy Visit)    /Left message for patient with results and dosing recommendations. Asked patient to call back to report any missed doses, falls, signs and symptoms of bleeding or clotting, any changes in health, medication, or diet. Asked patient to call back with any questions or concerns.    Brendan Montoya RN

## 2017-09-15 NOTE — NURSING NOTE
1). PUMP DATA  Primary controller serial number: [id number]    HM II:   Flow: 5.2 L/min,    Speed: 9600 RPMs,     PI: 5 ,  Power: 6.6 Manuel,      Primary controller   Back up battery: Patient use: 9, Replace in: 27  Months     Data downloaded: No   Equipment and driveline assessed for damage: Yes     Back up : Serial number: [id number]  Back up battery: Patient use: 4 Replace in: 29  Months  Programmed settings identical to the settings on the primary controller :Yes      Education complete: Yes   Charge the BACKUP controller s backup battery every 6 months  Perform a self test on BACKUP every 6 months  Change the MPU s batteries every 6 months:Yes  Have equipment serviced yearly (if applicable):Yes    2). ALARMS  Alarms reported by patient since last pump evaluation: No  Alarms or other finding noted during pump data history and alarm download: Yes, lots of PI events. Suspect he is dry from increased bumex over last weekend    Action Taken:  Reviewed data with patient: Yes      3). DRESSING CHANGE / DRIVELINE ASSESSMENT  Dressing change completed today: No  Appearance of Driveline site: Site reported to be CDI, scant drainage if any. Recommending switching to the weekly dressing.    Driveline stabilization: Method: [employer]  [ Teaching reinforced on need for stabilization of Driveline. ]

## 2017-09-15 NOTE — PATIENT INSTRUCTIONS
Medications:  1. Drop bumex dose from 2mg daily to alternating daily 1mg and 2mg other other day.     Follow-up:  1. Get basic metabolic panel and LDH with next INR draw, Friday would be preferable.  2. Next clinic visit in December with Dr. Montgomery    Instructions:  1. Lets try switching to the weekly dressing at this time. I'll make that order change with [facility].     Page the VAD Coordinator on call if you gain more than 3 lb in a day or 5 in a week. Please also page if you feel unwell or have alarms.     Great to see you in clinic today. To Page the VAD Coordinator on call, dial [phone] option #[phone] and ask to speak to the VAD coordinator on call.

## 2017-09-15 NOTE — LETTER
9/15/2017      RE: Jim Willingham  [address] [place] [id number]       Dear Colleague,    Thank you for the opportunity to participate in the care of your patient, Jim Willingham, at the [facility] at [facility]. Please see a copy of my visit note below.    HPI:   Jim Willingham is a 62 year old male with a past medical history of NICM (EF 20%) s/p HM II LVAD placement (6/19/17) complicated by arrhythmias, WALTER, and small left pleural effusion, CKD Stage III, DM Type II, CECILIA, obesity, HTN, COPD, and asthma who is here for heart failure and LVAD follow up.     Jim reports he went golfing for the first time in several years. He enjoyed it but did experience some lightheadedness and had a momentary dozed off. No loss of consciousness but his friends were concerned. He felt fine afterwards. Minimal dyspnea that recovers easily and does not limit his activity. No orthopnea or PND. Using his CPAP. No chest pain, palpitations, No edema. Appetite is good. No bleeding. No focal deficits. Driveline is good. No fevers or chills      PAST MEDICAL HISTORY:  Past Medical History:   Diagnosis Date     Benign essential hypertension 5/11/2017     Cardiomyopathy, unspecified (H) 5/8/2017     CKD (chronic kidney disease) stage 3, GFR 30-59 ml/min 5/11/2017     Depression 5/11/2017     Diabetes mellitus (H) 5/11/2017     H/O gastric bypass 5/11/2017     ICD (implantable cardioverter-defibrillator), biventricular, in situ 5/11/2017     NICM (nonischemic cardiomyopathy) (H)/ EF 20% 5/11/2017    ECHO: LVEDd. 7.66 cm, Restrictive pattern , Severe mitral valve regurgitation     CECILIA (obstructive sleep apnea) 5/11/2017     Paroxysmal atrial fibrillation (H) 5/11/2017     Paroxysmal VT (H) 5/11/2017     Vitamin B12 deficiency (non anemic) 5/11/2017       FAMILY HISTORY:  Family History   Problem Relation Age of Onset     CEREBROVASCULAR DISEASE Mother      DIABETES Mother       Hypertension Mother      Coronary Artery Disease Father      Type 2 Diabetes Father      SOCIAL HISTORY:  Social History     Social History     Marital status:      Spouse name: N/A     Number of children: N/A     Years of education: N/A     Social History Main Topics     Smoking status: Former Smoker     Quit date: 1995     Smokeless tobacco: Not on file     Alcohol use No     Drug use: Not on file     Sexual activity: Yes     Partners: Female     Other Topics Concern     Parent/Sibling W/ Cabg, Mi Or Angioplasty Before 65f 55m? No     Social History Narrative       CURRENT MEDICATIONS:  Current Outpatient Prescriptions on File Prior to Visit:  bumetanide (BUMEX) 1 MG tablet Take 2 tablets (2 mg) by mouth daily   spironolactone (ALDACTONE) 25 MG tablet Take 1 tablet (25 mg) by mouth daily   allopurinol (ZYLOPRIM) 100 MG tablet Take 1 tablet (100 mg) by mouth daily   losartan (COZAAR) 25 MG tablet Take 1 tablet (25 mg) by mouth daily   pantoprazole (PROTONIX) 40 MG EC tablet Take 1 tablet (40 mg) by mouth every morning   metoprolol (TOPROL-XL) 25 MG 24 hr tablet Take 1 tablet (25 mg) by mouth 2 times daily   aspirin 81 MG chewable tablet 1 tablet (81 mg) by Oral or Feeding Tube route daily   metFORMIN (GLUCOPHAGE) 500 MG tablet Take 0.5 tablets (250 mg) by mouth 2 times daily (with meals)   warfarin (COUMADIN) 3 MG tablet Use as directed.   traMADol (ULTRAM) 50 MG tablet Take 2 tablets (100 mg) by mouth every 6 hours as needed for moderate pain or breakthrough pain   acetaminophen (TYLENOL) 325 MG tablet Take 2 tablets (650 mg) by mouth every 6 hours   albuterol (ALBUTEROL) 108 (90 BASE) MCG/ACT Inhaler Inhale 2 puffs into the lungs every 4 hours as needed for shortness of breath / dyspnea or wheezing   ipratropium - albuterol 0.5 mg/2.5 mg/3 mL (DUONEB) 0.5-2.5 (3) MG/3ML neb solution Take 3 mLs by nebulization every 4 hours as needed for shortness of breath / dyspnea or wheezing   citalopram (CELEXA) 20 MG  "tablet Take 20 mg by mouth daily   cyanocobalamin (VITAMIN B12) 1000 MCG/ML injection Inject 1,000 mcg into the muscle every 30 days   fluticasone-vilanterol (BREO ELLIPTA) 200-25 MCG/INH oral inhaler Inhale 1 puff into the lungs daily   montelukast (SINGULAIR) 10 MG tablet Take 10 mg by mouth At Bedtime   umeclidinium (INCRUSE ELLIPTA) 62.5 MCG/INH oral inhaler Inhale 1 puff into the lungs daily   zinc sulfate (ZINCATE) 220 (50 ZN) MG capsule Take 220 mg by mouth 2 times daily   enoxaparin (ENOXAPARIN) 120 MG/0.8ML injection Inject 110mgs subq every 12 hours as directed by the Anticoagulation Clinic   COLCHICINE PO Take by mouth daily   predniSONE (DELTASONE) 20 MG tablet Take 1 tablet (20 mg) by mouth daily (Patient not taking: Reported on 8/11/2017)   senna-docusate (SENOKOT-S;PERICOLACE) 8.6-50 MG per tablet Take 2 tablets by mouth 2 times daily as needed for constipation (Patient not taking: Reported on 9/15/2017)   traZODone (DESYREL) 50 MG tablet Take 50 mg by mouth nightly x one week then reduce to 25 mg (1/2 tab) by mouth nightly x one week then stop. (Patient not taking: Reported on 8/11/2017)   HYDROmorphone (DILAUDID) 2 MG tablet Take 1 tablet (2 mg) by mouth every 4 hours as needed for moderate to severe pain (Patient not taking: Reported on 7/25/2017)   polyethylene glycol (MIRALAX/GLYCOLAX) Packet Take 17 g by mouth daily as needed for constipation (for no bm in 1 day) (Patient not taking: Reported on 9/15/2017)     No current facility-administered medications on file prior to visit.     EXAM:  BP (!) 78/0 (BP Location: Right arm, Patient Position: Chair, Cuff Size: Adult Regular)  Pulse 74  Temp 98  F (36.7  C)  Ht 1.727 m (5' 8\")  Wt 113.1 kg (249 lb 4.8 oz)  SpO2 96%  BMI 37.91 kg/m2  General: appears comfortable, alert and articulate  Head: normocephalic, atraumatic  Eyes: anicteric sclera, EOMI  Neck: no adenopathy, neck supple  Orophyarynx: moist mucosa, no lesions, dentition " intact  Heart: LVAD hum present, no murmur, gallop, rub.  No appreciable JVD.  Lungs: clear, no rales or wheezing  Abdomen: soft, non-tender, bowel sounds present, no hepatomegaly.  LVAD driveline site CDI.  Extremities: no clubbing, cyanosis or edema.  No palpable peripheral pulses.   Neurological: normal speech and affect, no gross motor deficits      VAD Interrogation on 9/15/17: VAD interrogation reviewed with VAD coordinator. Agree with findings. Frequent PI events, often accompanied with speed changes. No power spikes, speed drops, or other findings suspicious of pump malfunction noted.     Labs:  Reviewed CBC, CMP, LDH, INR,     Most recent echocardiogram:  ECHO LVAD RAMP 8/11/17  Interpretation Summary  LVAD optimization study.     9800 RPM LVIDd 6.4 cm, LVIDs 6.2 cm AV remains closed, mild MR, septum closer  to inflow cannula  9600 RPM LVIDd 6.69 cm, LVIDs 6.28, AV opens intermittently, mild MR, septum  closer to inflow cannula  9400 rpm LVIDd 6.68 cm, LVIDs 6.32 cm, AV opens partially every other  beat,mild to moderate MR, septum neutral  9000 RPM.LVIDd 6.87, LVIDs 6.31 cm, AV opens every other beat, mild to  moderate MR,septum neutral, normal inflow velocity, outflow velocity cannot be  assessed.  8800 rpm LVIDd 6.8 cm, LVIDs 6.22 cm, AV opens every beat , moderate MR,  septum neutral     Estimated mean right atrial pressure is 15 mmHg.  The inferior vena cava was dilated at 2.98 cm without respiratory variability,  consistent with increased right atrial pressure.  Global right ventricular function is moderately reduced.  No pericardial effusion is present.    Assessment and Plan:    Jim Willingham is a 62 year old male with a past medical history of NICM (EF 20%) s/p HM II LVAD placement who appears well. He does appear dry today with creatinine trending up and lots of PI events. His pre-syncopal episode does not correlate with any arrhythmias but he did have lots of PI events the day of the event,  suggesting he was dry then as well. He will reduce Bumex to 1 mg every other day, alternating with 2 mg daily on the alternate days. His LDH is up today though no pump disturbances or evidence of hemolysis. We'll repeat LDH with next INR and a CMP given bump in creatinine today. He will return to see Dr. Montgomery in December.       1.  Chronic systolic heart failure secondary to NICM.    Stage D    NYHA Class II  ACEi/ARB: losartan  BB: Toprol XL 25 mg twice daily.  Aldosterone antagonist: Aldactone to 25 mg daily.   SCD prophylaxis: CRT-D  Fluid status:  Hypovolemic     2.  LVAD S/P HM II LVAD 6/19/17:  MAP Well controlled on current regimen.  Anticoagulation: Warfarin INR goal 2-3. Therapeutic today  Antiplatelet:  ASA dose 81 mg daily.  LDH:   370 today up from baseline. (273 on 7/25). Repeat LDH with next INR     3.  CKD Stage III: Creatinine trending up today. Suspect due to hypovolemia. Reducing diuretics today and repeating CMP with next INR check     4.  HTN:  Controlled on Toprol XL.        5.  Paroxysmal Atrial Fibrillation. Anticoagulated and rate controlled with Toprol XL.       25 minuets spent in direct care, >50% in counseling    Jeanine Wilson NP

## 2017-09-15 NOTE — PROGRESS NOTES
HPI:   Jim Willingham is a 62 year old male with a past medical history of NICM (EF 20%) s/p HM II LVAD placement (6/19/17) complicated by arrhythmias, WALTER, and small left pleural effusion, CKD Stage III, DM Type II, CECILIA, obesity, HTN, COPD, and asthma who is here for heart failure and LVAD follow up.     Jim reports he went golfing for the first time in several years. He enjoyed it but did experience some lightheadedness and had a momentary dozed off. No loss of consciousness but his friends were concerned. He felt fine afterwards. Minimal dyspnea that recovers easily and does not limit his activity. No orthopnea or PND. Using his CPAP. No chest pain, palpitations, No edema. Appetite is good. No bleeding. No focal deficits. Driveline is good. No fevers or chills      PAST MEDICAL HISTORY:  Past Medical History:   Diagnosis Date     Benign essential hypertension 5/11/2017     Cardiomyopathy, unspecified (H) 5/8/2017     CKD (chronic kidney disease) stage 3, GFR 30-59 ml/min 5/11/2017     Depression 5/11/2017     Diabetes mellitus (H) 5/11/2017     H/O gastric bypass 5/11/2017     ICD (implantable cardioverter-defibrillator), biventricular, in situ 5/11/2017     NICM (nonischemic cardiomyopathy) (H)/ EF 20% 5/11/2017    ECHO: LVEDd. 7.66 cm, Restrictive pattern , Severe mitral valve regurgitation     CECILIA (obstructive sleep apnea) 5/11/2017     Paroxysmal atrial fibrillation (H) 5/11/2017     Paroxysmal VT (H) 5/11/2017     Vitamin B12 deficiency (non anemic) 5/11/2017       FAMILY HISTORY:  Family History   Problem Relation Age of Onset     CEREBROVASCULAR DISEASE Mother      DIABETES Mother      Hypertension Mother      Coronary Artery Disease Father      Type 2 Diabetes Father      SOCIAL HISTORY:  Social History     Social History     Marital status:      Spouse name: N/A     Number of children: N/A     Years of education: N/A     Social History Main Topics     Smoking status: Former Smoker     Quit date:  1995     Smokeless tobacco: Not on file     Alcohol use No     Drug use: Not on file     Sexual activity: Yes     Partners: Female     Other Topics Concern     Parent/Sibling W/ Cabg, Mi Or Angioplasty Before 65f 55m? No     Social History Narrative       CURRENT MEDICATIONS:  Current Outpatient Prescriptions on File Prior to Visit:  bumetanide (BUMEX) 1 MG tablet Take 2 tablets (2 mg) by mouth daily   spironolactone (ALDACTONE) 25 MG tablet Take 1 tablet (25 mg) by mouth daily   allopurinol (ZYLOPRIM) 100 MG tablet Take 1 tablet (100 mg) by mouth daily   losartan (COZAAR) 25 MG tablet Take 1 tablet (25 mg) by mouth daily   pantoprazole (PROTONIX) 40 MG EC tablet Take 1 tablet (40 mg) by mouth every morning   metoprolol (TOPROL-XL) 25 MG 24 hr tablet Take 1 tablet (25 mg) by mouth 2 times daily   aspirin 81 MG chewable tablet 1 tablet (81 mg) by Oral or Feeding Tube route daily   metFORMIN (GLUCOPHAGE) 500 MG tablet Take 0.5 tablets (250 mg) by mouth 2 times daily (with meals)   warfarin (COUMADIN) 3 MG tablet Use as directed.   traMADol (ULTRAM) 50 MG tablet Take 2 tablets (100 mg) by mouth every 6 hours as needed for moderate pain or breakthrough pain   acetaminophen (TYLENOL) 325 MG tablet Take 2 tablets (650 mg) by mouth every 6 hours   albuterol (ALBUTEROL) 108 (90 BASE) MCG/ACT Inhaler Inhale 2 puffs into the lungs every 4 hours as needed for shortness of breath / dyspnea or wheezing   ipratropium - albuterol 0.5 mg/2.5 mg/3 mL (DUONEB) 0.5-2.5 (3) MG/3ML neb solution Take 3 mLs by nebulization every 4 hours as needed for shortness of breath / dyspnea or wheezing   citalopram (CELEXA) 20 MG tablet Take 20 mg by mouth daily   cyanocobalamin (VITAMIN B12) 1000 MCG/ML injection Inject 1,000 mcg into the muscle every 30 days   fluticasone-vilanterol (BREO ELLIPTA) 200-25 MCG/INH oral inhaler Inhale 1 puff into the lungs daily   montelukast (SINGULAIR) 10 MG tablet Take 10 mg by mouth At Bedtime   umeclidinium  "(INCRUSE ELLIPTA) 62.5 MCG/INH oral inhaler Inhale 1 puff into the lungs daily   zinc sulfate (ZINCATE) 220 (50 ZN) MG capsule Take 220 mg by mouth 2 times daily   enoxaparin (ENOXAPARIN) 120 MG/0.8ML injection Inject 110mgs subq every 12 hours as directed by the Anticoagulation Clinic   COLCHICINE PO Take by mouth daily   predniSONE (DELTASONE) 20 MG tablet Take 1 tablet (20 mg) by mouth daily (Patient not taking: Reported on 8/11/2017)   senna-docusate (SENOKOT-S;PERICOLACE) 8.6-50 MG per tablet Take 2 tablets by mouth 2 times daily as needed for constipation (Patient not taking: Reported on 9/15/2017)   traZODone (DESYREL) 50 MG tablet Take 50 mg by mouth nightly x one week then reduce to 25 mg (1/2 tab) by mouth nightly x one week then stop. (Patient not taking: Reported on 8/11/2017)   HYDROmorphone (DILAUDID) 2 MG tablet Take 1 tablet (2 mg) by mouth every 4 hours as needed for moderate to severe pain (Patient not taking: Reported on 7/25/2017)   polyethylene glycol (MIRALAX/GLYCOLAX) Packet Take 17 g by mouth daily as needed for constipation (for no bm in 1 day) (Patient not taking: Reported on 9/15/2017)     No current facility-administered medications on file prior to visit.     EXAM:  BP (!) 78/0 (BP Location: Right arm, Patient Position: Chair, Cuff Size: Adult Regular)  Pulse 74  Temp 98  F (36.7  C)  Ht 1.727 m (5' 8\")  Wt 113.1 kg (249 lb 4.8 oz)  SpO2 96%  BMI 37.91 kg/m2  General: appears comfortable, alert and articulate  Head: normocephalic, atraumatic  Eyes: anicteric sclera, EOMI  Neck: no adenopathy, neck supple  Orophyarynx: moist mucosa, no lesions, dentition intact  Heart: LVAD hum present, no murmur, gallop, rub.  No appreciable JVD.  Lungs: clear, no rales or wheezing  Abdomen: soft, non-tender, bowel sounds present, no hepatomegaly.  LVAD driveline site CDI.  Extremities: no clubbing, cyanosis or edema.  No palpable peripheral pulses.   Neurological: normal speech and affect, no " gross motor deficits      VAD Interrogation on 9/15/17: VAD interrogation reviewed with VAD coordinator. Agree with findings. Frequent PI events, often accompanied with speed changes. No power spikes, speed drops, or other findings suspicious of pump malfunction noted.     Labs:  Reviewed CBC, CMP, LDH, INR,     Most recent echocardiogram:  ECHO LVAD RAMP 8/11/17  Interpretation Summary  LVAD optimization study.     9800 RPM LVIDd 6.4 cm, LVIDs 6.2 cm AV remains closed, mild MR, septum closer  to inflow cannula  9600 RPM LVIDd 6.69 cm, LVIDs 6.28, AV opens intermittently, mild MR, septum  closer to inflow cannula  9400 rpm LVIDd 6.68 cm, LVIDs 6.32 cm, AV opens partially every other  beat,mild to moderate MR, septum neutral  9000 RPM.LVIDd 6.87, LVIDs 6.31 cm, AV opens every other beat, mild to  moderate MR,septum neutral, normal inflow velocity, outflow velocity cannot be  assessed.  8800 rpm LVIDd 6.8 cm, LVIDs 6.22 cm, AV opens every beat , moderate MR,  septum neutral     Estimated mean right atrial pressure is 15 mmHg.  The inferior vena cava was dilated at 2.98 cm without respiratory variability,  consistent with increased right atrial pressure.  Global right ventricular function is moderately reduced.  No pericardial effusion is present.    Assessment and Plan:    Jim Willingham is a 62 year old male with a past medical history of NICM (EF 20%) s/p HM II LVAD placement who appears well. He does appear dry today with creatinine trending up and lots of PI events. His pre-syncopal episode does not correlate with any arrhythmias but he did have lots of PI events the day of the event, suggesting he was dry then as well. He will reduce Bumex to 1 mg every other day, alternating with 2 mg daily on the alternate days. His LDH is up today though no pump disturbances or evidence of hemolysis. We'll repeat LDH with next INR and a CMP given bump in creatinine today. He will return to see Dr. Montgomery in December.       1.   Chronic systolic heart failure secondary to NICM.    Stage D    NYHA Class II  ACEi/ARB: losartan  BB: Toprol XL 25 mg twice daily.  Aldosterone antagonist: Aldactone to 25 mg daily.   SCD prophylaxis: CRT-D  Fluid status: Hypovolemic     2.  LVAD S/P HM II LVAD 6/19/17:  MAP Well controlled on current regimen.  Anticoagulation: Warfarin INR goal 2-3. Therapeutic today  Antiplatelet:  ASA dose 81 mg daily.  LDH:  370 today up from baseline. (273 on 7/25). Repeat LDH with next INR     3.  CKD Stage III: Creatinine trending up today. Suspect due to hypovolemia. Reducing diuretics today and repeating CMP with next INR check     4.  HTN:  Controlled on Toprol XL.        5.  Paroxysmal Atrial Fibrillation. Anticoagulated and rate controlled with Toprol XL.          25 minuets spent in direct care, >50% in counseling

## 2017-09-15 NOTE — NURSING NOTE
Chief Complaint   Patient presents with     Follow Up For     VAD 3 months fu intermacs questionaires and trial making     Vitals were taken and medications were reconciled.    Estephanie Olvera CMA     10:14 AM

## 2017-09-15 NOTE — MR AVS SNAPSHOT
After Visit Summary   9/15/2017    Jim Willingham    MRN: [id number]           Patient Information     Date Of Birth          1955        Visit Information        Provider Department      9/15/2017 10:00 AM Jeanine Wilson NP [facility]        Today's Diagnoses     LVAD (left ventricular assist device) present (H)    -  1      Care Instructions    Medications:  1. Drop bumex dose from 2mg daily to alternating daily 1mg and 2mg other other day.     Follow-up:  1. Get basic metabolic panel and LDH with next INR draw, Friday would be preferable.  2. Next clinic visit in December with Dr. Montgomery    Instructions:  1. Lets try switching to the weekly dressing at this time. I'll make that order change with [facility].     Page the VAD Coordinator on call if you gain more than 3 lb in a day or 5 in a week. Please also page if you feel unwell or have alarms.     Great to see you in clinic today. To Page the VAD Coordinator on call, dial [phone] option #[phone] and ask to speak to the VAD coordinator on call.               Follow-ups after your visit        Your next 10 appointments already scheduled     Sep 15, 2017  1:30 PM CDT   Ech Complete Lvad* with ECHCR1   [facility])    [address] [place] [id number] [phone]           1.  Please bring or wear a comfortable two-piece outfit. 2.  You may eat, drink and take your normal medicines. 3.  For any questions that cannot be answered, please contact the ordering physician            Dec 19, 2017 10:30 AM CST   Lab with  LAB    [facility] ([facility])    [address] [place] [id number] [phone]            Dec 19, 2017 11:00 AM CST   (Arrive by 10:45 AM)   Implanted Defibulator with  Cv Device 1   [facility] ([facility])    [address] [place] [address]  "[facility] [address]   [phone]            Dec 19, 2017 11:30 AM CST   (Arrive by 11:15 AM)   Ventricular Assist Device with Delisa Montgomery MD   [facility] ([facility])    [address] [facility] [facility]  3rd [facility] [id number] [phone]              Who to contact     If you have questions or need follow up information about today's clinic visit or your schedule please contact [facility] directly at [phone].  Normal or non-critical lab and imaging results will be communicated to you by [facility], letter or phone within 4 business days after the clinic has received the results. If you do not hear from us within 7 days, please contact the clinic through [employer] or phone. If you have a critical or abnormal lab result, we will notify you by phone as soon as possible.  Submit refill requests through [employer] or call your pharmacy and they will forward the refill request to us. Please allow 3 business days for your refill to be completed.          Additional Information About Your Visit        [employer] Information     [employer] lets you send messages to your doctor, view your test results, renew your prescriptions, schedule appointments and more. To sign up, go to [email or web address] . Click on \"Log in\" on the left side of the screen, which will take you to the Welcome page. Then click on \"Sign up Now\" on the right side of the page.     You will be asked to enter the access code listed below, as well as some personal information. Please follow the directions to create your username and password.     Your access code is: [email or web address]  Expires: 2017 11:18 AM     Your access code will  in 90 days. If you need help or a new code, please call your [place] clinic or [phone].        Care EveryWhere ID     This is your Care EveryWhere ID. This could be used by other organizations to access your [place] medical records  [id number]   " "     Your Vitals Were     Pulse Temperature Height Pulse Oximetry BMI (Body Mass Index)       74 98  F (36.7  C) 1.727 m (5' 8\") 96% 37.91 kg/m2        Blood Pressure from Last 3 Encounters:   09/15/17 (!) 78/0   08/21/17 (!) 74/0   08/11/17 (!) 82/0    Weight from Last 3 Encounters:   09/15/17 113.1 kg (249 lb 4.8 oz)   08/21/17 111.9 kg (246 lb 12.8 oz)   08/11/17 109.1 kg (240 lb 8 oz)              We Performed the Following     (20671) INTERROGATE VENT ASSIST DEVICE, IN PERSON, DAGO JETER ANALYSIS PARAMETERS        Primary Care Provider Office Phone # Fax #    Jovany Salas [phone]       [address] [address]        Equal Access to Services     [facility] : Hadii andrea chopra hadasho Soomaali, waaxda luqadaha, qaybta kaalmada adeegyada, kendra marquez . So [facility] [phone].    ATENCIÓN: Si yrn garcia, tiene a roger disposición servicios gratuitos de asistencia lingüística. Llame al [phone].    We comply with applicable federal civil rights laws and [place] laws. We do not discriminate on the basis of race, color, national origin, age, disability sex, sexual orientation or gender identity.            Thank you!     Thank you for choosing [facility]  for your care. Our goal is always to provide you with excellent care. Hearing back from our patients is one way we can continue to improve our services. Please take a few minutes to complete the written survey that you may receive in the mail after your visit with us. Thank you!             Your Updated Medication List - Protect others around you: Learn how to safely use, store and throw away your medicines at www.disposemymeds.org.          This list is accurate as of: 9/15/17 11:13 AM.  Always use your most recent med list.                   Brand Name Dispense Instructions for use Diagnosis    acetaminophen 325 MG tablet    TYLENOL    100 tablet    Take 2 tablets (650 " mg) by mouth every 6 hours    Acute post-operative pain       ALDACTONE 25 MG tablet   Generic drug:  spironolactone     30 tablet    Take 1 tablet (25 mg) by mouth daily    LVAD (left ventricular assist device) present (H), Chronic systolic congestive heart failure (H)       allopurinol 100 MG tablet    ZYLOPRIM    30 tablet    Take 1 tablet (100 mg) by mouth daily    Acute idiopathic gout, unspecified site       aspirin 81 MG chewable tablet     36 tablet    1 tablet (81 mg) by Oral or Feeding Tube route daily    LVAD (left ventricular assist device) present (H)       BREO ELLIPTA 200-25 MCG/INH oral inhaler   Generic drug:  fluticasone-vilanterol      Inhale 1 puff into the lungs daily        bumetanide 1 MG tablet    BUMEX    30 tablet    Take 2 mg by mouth daily Alternate 1mg daily with 2mg daily, every other day.    LVAD (left ventricular assist device) present (H)       celeXA 20 MG tablet   Generic drug:  citalopram      Take 20 mg by mouth daily        COLCHICINE PO      Take by mouth daily        cyanocobalamin 1000 MCG/ML injection    VITAMIN B12     Inject 1,000 mcg into the muscle every 30 days        enoxaparin 120 MG/0.8ML injection   Generic drug:  enoxaparin     10 Syringe    Inject 110mgs subq every 12 hours as directed by the Anticoagulation Clinic        HYDROmorphone 2 MG tablet    DILAUDID    45 tablet    Take 1 tablet (2 mg) by mouth every 4 hours as needed for moderate to severe pain    Acute post-operative pain       INCRUSE ELLIPTA 62.5 MCG/INH oral inhaler   Generic drug:  umeclidinium      Inhale 1 puff into the lungs daily        ipratropium - albuterol 0.5 mg/2.5 mg/3 mL 0.5-2.5 (3) MG/3ML neb solution    DUONEB     Take 3 mLs by nebulization every 4 hours as needed for shortness of breath / dyspnea or wheezing        losartan 25 MG tablet    COZAAR    60 tablet    Take 1 tablet (25 mg) by mouth daily    LVAD (left ventricular assist device) present (H), Chronic systolic congestive  heart failure (H)       metFORMIN 500 MG tablet    GLUCOPHAGE    60 tablet    Take 0.5 tablets (250 mg) by mouth 2 times daily (with meals)    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, without long-term current use of insulin, unspecified CKD stage (H)       metoprolol 25 MG 24 hr tablet    TOPROL-XL    180 tablet    Take 1 tablet (25 mg) by mouth 2 times daily    Chronic systolic congestive heart failure (H), LVAD (left ventricular assist device) present (H)       pantoprazole 40 MG EC tablet    PROTONIX    30 tablet    Take 1 tablet (40 mg) by mouth every morning    Acute post-operative pain       polyethylene glycol Packet    MIRALAX/GLYCOLAX    7 packet    Take 17 g by mouth daily as needed for constipation (for no bm in 1 day)    Acute post-operative pain       predniSONE 20 MG tablet    DELTASONE    20 tablet    Take 1 tablet (20 mg) by mouth daily    Acute idiopathic gout, unspecified site       PROAIR  (90 BASE) MCG/ACT Inhaler   Generic drug:  albuterol      Inhale 2 puffs into the lungs every 4 hours as needed for shortness of breath / dyspnea or wheezing        senna-docusate 8.6-50 MG per tablet    SENOKOT-S;PERICOLACE    100 tablet    Take 2 tablets by mouth 2 times daily as needed for constipation    Acute post-operative pain       SINGULAIR 10 MG tablet   Generic drug:  montelukast      Take 10 mg by mouth At Bedtime        traMADol 50 MG tablet    ULTRAM    28 tablet    Take 2 tablets (100 mg) by mouth every 6 hours as needed for moderate pain or breakthrough pain    Acute post-operative pain       traZODone 50 MG tablet    DESYREL    11 tablet    Take 50 mg by mouth nightly x one week then reduce to 25 mg (1/2 tab) by mouth nightly x one week then stop.    Acute post-operative pain, LVAD (left ventricular assist device) present (H)       UNABLE TO FIND      MEDICATION NAME: Breo        warfarin 3 MG tablet    COUMADIN    30 tablet    Use as directed.    LVAD  (left ventricular assist device) present (H)       zinc sulfate 220 (50 ZN) MG capsule    ZINCATE     Take 220 mg by mouth 2 times daily

## 2017-09-18 ENCOUNTER — CARE COORDINATION (OUTPATIENT)
Dept: CARDIOLOGY | Facility: CLINIC | Age: 62
End: 2017-09-18

## 2017-09-18 DIAGNOSIS — I50.22 CHRONIC SYSTOLIC CONGESTIVE HEART FAILURE (H): Primary | ICD-10-CM

## 2017-09-18 DIAGNOSIS — Z95.811 LVAD (LEFT VENTRICULAR ASSIST DEVICE) PRESENT (H): ICD-10-CM

## 2017-09-18 NOTE — PROGRESS NOTES
Called patient/caregiver to check in 10 weeks post discharge. Pt reports VAD parameters WNL and weight stable. Reviewed medications and answered any questions. Patient reports sleeping well and no anxiety since being home with LVAD. Patient is able to move around the house and care for himself independently (pt reports he feels so well that he sometimes over-does it and needs a day or two to recover).     Discussed specific new problems/stressors since being discharged from the hospital: none - pt reports he will get repeat labs on Friday. Empathized with patient and reviewed coping strategies: enlisting support from friends and love ones, attending patient and caregiver support groups, reviewing LVAD educational materials to reinforce knowledge, and talking about concerns with family/care providers/trusted others. Encouraged pt to page VAD Coordinator with any issues or questions. Pt verbalizes understanding.

## 2017-09-22 ENCOUNTER — ANTICOAGULATION THERAPY VISIT (OUTPATIENT)
Dept: ANTICOAGULATION | Facility: CLINIC | Age: 62
End: 2017-09-22

## 2017-09-22 DIAGNOSIS — Z95.811 LVAD (LEFT VENTRICULAR ASSIST DEVICE) PRESENT (H): ICD-10-CM

## 2017-09-22 DIAGNOSIS — Z79.01 LONG-TERM (CURRENT) USE OF ANTICOAGULANTS: ICD-10-CM

## 2017-09-22 DIAGNOSIS — I50.22 CHRONIC SYSTOLIC CONGESTIVE HEART FAILURE (H): ICD-10-CM

## 2017-09-22 LAB
ALBUMIN SERPL-MCNC: 3.7 G/DL (ref 3.4–5)
ALP SERPL-CCNC: 88 U/L (ref 40–150)
ALT SERPL W P-5'-P-CCNC: 32 U/L (ref 0–70)
ANION GAP SERPL CALCULATED.3IONS-SCNC: 4 MMOL/L (ref 3–14)
AST SERPL W P-5'-P-CCNC: 26 U/L (ref 0–45)
BILIRUB SERPL-MCNC: 0.6 MG/DL (ref 0.2–1.3)
BUN SERPL-MCNC: 33 MG/DL (ref 7–30)
CALCIUM SERPL-MCNC: 8.6 MG/DL (ref 8.5–10.1)
CHLORIDE SERPL-SCNC: 104 MMOL/L (ref 94–109)
CO2 SERPL-SCNC: 31 MMOL/L (ref 20–32)
CREAT SERPL-MCNC: 1.28 MG/DL (ref 0.66–1.25)
GFR SERPL CREATININE-BSD FRML MDRD: 57 ML/MIN/1.7M2
GLUCOSE SERPL-MCNC: 109 MG/DL (ref 70–99)
INR PPP: 2.12 (ref 0.86–1.14)
LDH SERPL L TO P-CCNC: 324 U/L (ref 85–227)
POTASSIUM SERPL-SCNC: 3.9 MMOL/L (ref 3.4–5.3)
PROT SERPL-MCNC: 7.1 G/DL (ref 6.8–8.8)
SODIUM SERPL-SCNC: 139 MMOL/L (ref 133–144)

## 2017-09-22 PROCEDURE — 83615 LACTATE (LD) (LDH) ENZYME: CPT | Performed by: INTERNAL MEDICINE

## 2017-09-22 PROCEDURE — 36415 COLL VENOUS BLD VENIPUNCTURE: CPT | Performed by: INTERNAL MEDICINE

## 2017-09-22 PROCEDURE — 85610 PROTHROMBIN TIME: CPT | Performed by: INTERNAL MEDICINE

## 2017-09-22 PROCEDURE — 80053 COMPREHEN METABOLIC PANEL: CPT | Performed by: INTERNAL MEDICINE

## 2017-09-22 NOTE — MR AVS SNAPSHOT
Jim Willingham   9/22/2017   Anticoagulation Therapy Visit    Description:  62 year old male   Provider:  Maru Alonso, RN   Department:  [facility]           INR as of 9/22/2017     Today's INR 2.12      Anticoagulation Summary as of 9/22/2017     INR goal 2.0-3.0   Today's INR 2.12   Full instructions 10 mg on Mon, Wed, Fri; 7.5 mg all other days   Next INR check 9/29/2017    Indications   LVAD (left ventricular assist device) present (H) [Z95.811]  Long-term (current) use of anticoagulants [Z79.01] [Z79.01]         September 2017 Details    Sun Mon Tue Wed Thu Fri Sat          1               2                 3               4               5               6               7               8               9                 10               11               12               13               14               15               16                 17               18               19               20               21               22      10 mg   See details      23      7.5 mg           24      7.5 mg         25      10 mg         26      7.5 mg         27      10 mg         28      7.5 mg         29            30                Date Details   09/22 This INR check       Date of next INR:  9/29/2017         How to take your warfarin dose     To take:  7.5 mg Take 1.5 of the 5 mg tablets.    To take:  10 mg Take 2 of the 5 mg tablets.

## 2017-09-22 NOTE — PROGRESS NOTES
ANTICOAGULATION FOLLOW-UP CLINIC VISIT    Patient Name:  Jim Willingham  Date:  9/22/2017  Contact Type:  Telephone    SUBJECTIVE:     Patient Findings     Positives No Problem Findings    Comments Jim reports that he rearranged the days that he takes warfarin to 10mg Mon, Wed,Fr and 7.5mg all other days.           OBJECTIVE    INR   Date Value Ref Range Status   09/22/2017 2.12 (H) 0.86 - 1.14 Final       ASSESSMENT / PLAN  INR assessment THER    Recheck INR In: 1 WEEK    INR Location Clinic      Anticoagulation Summary as of 9/22/2017     INR goal 2.0-3.0   Today's INR 2.12   Maintenance plan 10 mg (5 mg x 2) on Mon, Wed, Fri; 7.5 mg (5 mg x 1.5) all other days   Full instructions 10 mg on Mon, Wed, Fri; 7.5 mg all other days   Weekly total 60 mg   Plan last modified Maru Alonso RN (9/22/2017)   Next INR check 9/29/2017   Priority INR   Target end date     Indications   LVAD (left ventricular assist device) present (H) [Z95.811]  Long-term (current) use of anticoagulants [Z79.01] [Z79.01]         Anticoagulation Episode Summary     INR check location     Preferred lab     Send INR reminders to [facility]    Comments HIPPA Forms mailed 6/26/17  Labs drawn either at [facility] or [facility]      Anticoagulation Care Providers     Provider Role Specialty Phone number    Delisa Montgomery MD Responsible Cardiology [phone]            See the Encounter Report to view Anticoagulation Flowsheet and Dosing Calendar (Go to Encounters tab in chart review, and find the Anticoagulation Therapy Visit)    Spoke with Jim.    Maru Alonso, REGINALD

## 2017-09-25 ENCOUNTER — CARE COORDINATION (OUTPATIENT)
Dept: CARDIOLOGY | Facility: CLINIC | Age: 62
End: 2017-09-25

## 2017-09-25 DIAGNOSIS — I50.22 CHRONIC SYSTOLIC CONGESTIVE HEART FAILURE (H): ICD-10-CM

## 2017-09-25 DIAGNOSIS — Z95.811 LVAD (LEFT VENTRICULAR ASSIST DEVICE) PRESENT (H): Primary | ICD-10-CM

## 2017-09-27 LAB
DLCOCOR-%PRED-PRE: 73 %
DLCOCOR-PRE: 20.2 ML/MIN/MMHG
DLCOUNC-%PRED-PRE: 63 %
DLCOUNC-PRE: 17.64 ML/MIN/MMHG
DLCOUNC-PRED: 27.64 ML/MIN/MMHG
ERV-%PRED-PRE: 32 %
ERV-PRE: 0.19 L
ERV-PRED: 0.58 L
EXPTIME-PRE: 9.85 SEC
FEF2575-%PRED-POST: 18 %
FEF2575-%PRED-PRE: 13 %
FEF2575-POST: 0.49 L/SEC
FEF2575-PRE: 0.36 L/SEC
FEF2575-PRED: 2.7 L/SEC
FEFMAX-%PRED-PRE: 54 %
FEFMAX-PRE: 4.77 L/SEC
FEFMAX-PRED: 8.81 L/SEC
FEV1-%PRED-PRE: 32 %
FEV1-PRE: 1.04 L
FEV1FEV6-PRE: 51 %
FEV1FEV6-PRED: 79 %
FEV1FVC-PRE: 48 %
FEV1FVC-PRED: 79 %
FEV1SVC-PRE: 39 %
FEV1SVC-PRED: 67 %
FIFMAX-PRE: 4.76 L/SEC
FVC-%PRED-PRE: 54 %
FVC-PRE: 2.18 L
FVC-PRED: 4.01 L
IC-%PRED-PRE: 60 %
IC-PRE: 2.5 L
IC-PRED: 4.1 L
VA-%PRED-PRE: 76 %
VA-PRE: 4.96 L
VC-%PRED-PRE: 57 %
VC-PRE: 2.69 L
VC-PRED: 4.68 L

## 2017-09-29 ENCOUNTER — ANTICOAGULATION THERAPY VISIT (OUTPATIENT)
Dept: ANTICOAGULATION | Facility: CLINIC | Age: 62
End: 2017-09-29

## 2017-09-29 ENCOUNTER — CARE COORDINATION (OUTPATIENT)
Dept: CARDIOLOGY | Facility: CLINIC | Age: 62
End: 2017-09-29

## 2017-09-29 DIAGNOSIS — Z79.01 LONG-TERM (CURRENT) USE OF ANTICOAGULANTS: ICD-10-CM

## 2017-09-29 DIAGNOSIS — Z95.811 LVAD (LEFT VENTRICULAR ASSIST DEVICE) PRESENT (H): ICD-10-CM

## 2017-09-29 LAB — INR PPP: 2.25 (ref 0.86–1.14)

## 2017-09-29 PROCEDURE — 85610 PROTHROMBIN TIME: CPT | Performed by: INTERNAL MEDICINE

## 2017-09-29 PROCEDURE — 36415 COLL VENOUS BLD VENIPUNCTURE: CPT | Performed by: INTERNAL MEDICINE

## 2017-09-29 NOTE — MR AVS SNAPSHOT
Jim Willingham   9/29/2017   Anticoagulation Therapy Visit    Description:  62 year old male   Provider:  Radha Pedro, RN   Department:  [facility]           INR as of 9/29/2017     Today's INR 2.25      Anticoagulation Summary as of 9/29/2017     INR goal 2.0-3.0   Today's INR 2.25   Full instructions 10 mg on Mon, Wed, Fri; 7.5 mg all other days   Next INR check 10/6/2017    Indications   LVAD (left ventricular assist device) present (H) [Z95.811]  Long-term (current) use of anticoagulants [Z79.01] [Z79.01]         September 2017 Details    Sun Mon Tue Wed Thu Fri Sat          1               2                 3               4               5               6               7               8               9                 10               11               12               13               14               15               16                 17               18               19               20               21               22               23                 24               25               26               27               28               29      10 mg   See details      30      7.5 mg          Date Details   09/29 This INR check               How to take your warfarin dose     To take:  7.5 mg Take 1.5 of the 5 mg tablets.    To take:  10 mg Take 2 of the 5 mg tablets.           October 2017 Details    Sun Mon Tue Wed Thu Fri Sat     1      7.5 mg         2      10 mg         3      7.5 mg         4      10 mg         5      7.5 mg         6            7                 8               9               10               11               12               13               14                 15               16               17               18               19               20               21                 22               23               24               25               26               27               28                 29               30               31                    Date Details    No additional details    Date of next INR:  10/6/2017         How to take your warfarin dose     To take:  7.5 mg Take 1.5 of the 5 mg tablets.    To take:  10 mg Take 2 of the 5 mg tablets.

## 2017-09-29 NOTE — PROGRESS NOTES
ANTICOAGULATION FOLLOW-UP CLINIC VISIT    Patient Name:  Jim Willingham  Date:  9/29/2017  Contact Type:  Telephone    SUBJECTIVE:     Patient Findings     Positives No Problem Findings           OBJECTIVE    INR   Date Value Ref Range Status   09/29/2017 2.25 (H) 0.86 - 1.14 Final       ASSESSMENT / PLAN  INR assessment THER    Recheck INR In: 1 WEEK    INR Location Clinic      Anticoagulation Summary as of 9/29/2017     INR goal 2.0-3.0   Today's INR 2.25   Maintenance plan 10 mg (5 mg x 2) on Mon, Wed, Fri; 7.5 mg (5 mg x 1.5) all other days   Full instructions 10 mg on Mon, Wed, Fri; 7.5 mg all other days   Weekly total 60 mg   Plan last modified Maru Alonso RN (9/22/2017)   Next INR check 10/6/2017   Priority INR   Target end date     Indications   LVAD (left ventricular assist device) present (H) [Z95.811]  Long-term (current) use of anticoagulants [Z79.01] [Z79.01]         Anticoagulation Episode Summary     INR check location     Preferred lab     Send INR reminders to [facility]    Comments HIPPA Forms mailed 6/26/17  Labs drawn either at [facility] or [facility]      Anticoagulation Care Providers     Provider Role Specialty Phone number    Delisa Montgomery MD Responsible Cardiology [phone]            See the Encounter Report to view Anticoagulation Flowsheet and Dosing Calendar (Go to Encounters tab in chart review, and find the Anticoagulation Therapy Visit)    Spoke with patient. Gave them their lab results and new warfarin recommendation.  No changes in health, medication, or diet. No missed doses, no falls. No signs or symptoms of bleed or clotting.     Radha Pedro RN

## 2017-09-29 NOTE — PROGRESS NOTES
Called pt to check in 12 weeks post d/c. Pt did not answer phone, left voicemail instructing pt to call back with any questions or concerns.

## 2017-09-29 NOTE — PROGRESS NOTES
Called patient/caregiver to check in 10 weeks post discharge. Pt reports VAD parameters WNL and weight stable. Reviewed medications and answered any questions. Patient reports sleeping well and no anxiety since being home with LVAD. Patient is able to move around the house and care for himself independently.     Discussed specific new problems/stressors since being discharged from the hospital: feels so well most days that he tries to do too much, then requires 1-2 days to recover. Empathized with patient and reviewed coping strategies: enlisting support from friends and love ones, attending patient and caregiver support groups, reviewing LVAD educational materials to reinforce knowledge, and talking about concerns with family/care providers/trusted others. Encouraged pt to page VAD Coordinator with any issues or questions. Pt verbalizes understanding.

## 2017-10-02 NOTE — PROGRESS NOTES
10/2/17 Received a call from Rajani VAD Coordinator called reporting that pt just started Cefdinir 300mg BID for 10 days due to URI and also on Prednisone until Wednesday 10/4. Spoke with pt and he has been on Cefdinir before and it hasn't interacted with his Warfarin. Per literature it could increase an INR. The soonest the pt can get an INR is Wednesday 10/4. Will f/u with pt when we receive the INR results. Radha Pedro RN

## 2017-10-03 DIAGNOSIS — G89.18 ACUTE POST-OPERATIVE PAIN: ICD-10-CM

## 2017-10-04 ENCOUNTER — ANTICOAGULATION THERAPY VISIT (OUTPATIENT)
Dept: ANTICOAGULATION | Facility: CLINIC | Age: 62
End: 2017-10-04

## 2017-10-04 DIAGNOSIS — Z79.01 LONG-TERM (CURRENT) USE OF ANTICOAGULANTS: ICD-10-CM

## 2017-10-04 DIAGNOSIS — Z95.811 LVAD (LEFT VENTRICULAR ASSIST DEVICE) PRESENT (H): ICD-10-CM

## 2017-10-04 LAB — INR PPP: 1.84 (ref 0.86–1.14)

## 2017-10-04 PROCEDURE — 85610 PROTHROMBIN TIME: CPT | Performed by: INTERNAL MEDICINE

## 2017-10-04 PROCEDURE — 36415 COLL VENOUS BLD VENIPUNCTURE: CPT | Performed by: INTERNAL MEDICINE

## 2017-10-04 NOTE — MR AVS SNAPSHOT
Jim Willingham   10/4/2017   Anticoagulation Therapy Visit    Description:  62 year old male   Provider:  Maru Alonso, RN   Department:  [facility]           INR as of 10/4/2017     Today's INR 1.84!      Anticoagulation Summary as of 10/4/2017     INR goal 2.0-3.0   Today's INR 1.84!   Full instructions 10/4: 12.5 mg; 10/5: 10 mg; Otherwise 10 mg on Mon, Wed, Fri; 7.5 mg all other days   Next INR check 10/6/2017    Indications   LVAD (left ventricular assist device) present (H) [Z95.811]  Long-term (current) use of anticoagulants [Z79.01] [Z79.01]         October 2017 Details    Sun Mon Tue Wed Thu Fri Sat     1               2               3               4      12.5 mg   See details      5      10 mg         6            7                 8               9               10               11               12               13               14                 15               16               17               18               19               20               21                 22               23               24               25               26               27               28                 29               30               31                    Date Details   10/04 This INR check       Date of next INR:  10/6/2017         How to take your warfarin dose     To take:  10 mg Take 2 of the 5 mg tablets.    To take:  12.5 mg Take 2.5 of the 5 mg tablets.

## 2017-10-04 NOTE — PROGRESS NOTES
ANTICOAGULATION FOLLOW-UP CLINIC VISIT    Patient Name:  Jim Willingham  Date:  10/4/2017  Contact Type:  Telephone    SUBJECTIVE:     Patient Findings     Comments Jim continues on cefdinir but done with prednisone today. Instructions were given to increase ASA to 325mg daily.           OBJECTIVE    INR   Date Value Ref Range Status   10/04/2017 1.84 (H) 0.86 - 1.14 Final       ASSESSMENT / PLAN  INR assessment SUB    Recheck INR In: 2 DAYS    INR Location Clinic      Anticoagulation Summary as of 10/4/2017     INR goal 2.0-3.0   Today's INR 1.84!   Maintenance plan 10 mg (5 mg x 2) on Mon, Wed, Fri; 7.5 mg (5 mg x 1.5) all other days   Full instructions 10/4: 12.5 mg; 10/5: 10 mg; Otherwise 10 mg on Mon, Wed, Fri; 7.5 mg all other days   Weekly total 60 mg   Plan last modified Maru Alonso RN (9/22/2017)   Next INR check 10/6/2017   Priority INR   Target end date     Indications   LVAD (left ventricular assist device) present (H) [Z95.811]  Long-term (current) use of anticoagulants [Z79.01] [Z79.01]         Anticoagulation Episode Summary     INR check location     Preferred lab     Send INR reminders to [facility]    Comments HIPPA Forms mailed 6/26/17  Labs drawn either at [facility] or [facility]      Anticoagulation Care Providers     Provider Role Specialty Phone number    Delisa Montgomery MD Responsible Cardiology [phone]            See the Encounter Report to view Anticoagulation Flowsheet and Dosing Calendar (Go to Encounters tab in chart review, and find the Anticoagulation Therapy Visit)    Spoke with Tatyana Alonso, REGINALD

## 2017-10-06 ENCOUNTER — ANTICOAGULATION THERAPY VISIT (OUTPATIENT)
Dept: ANTICOAGULATION | Facility: CLINIC | Age: 62
End: 2017-10-06

## 2017-10-06 DIAGNOSIS — Z95.811 LVAD (LEFT VENTRICULAR ASSIST DEVICE) PRESENT (H): ICD-10-CM

## 2017-10-06 DIAGNOSIS — Z79.01 LONG-TERM (CURRENT) USE OF ANTICOAGULANTS: ICD-10-CM

## 2017-10-06 DIAGNOSIS — I50.22 CHRONIC SYSTOLIC CONGESTIVE HEART FAILURE (H): ICD-10-CM

## 2017-10-06 LAB
ANION GAP SERPL CALCULATED.3IONS-SCNC: 7 MMOL/L (ref 3–14)
BUN SERPL-MCNC: 39 MG/DL (ref 7–30)
CALCIUM SERPL-MCNC: 8.3 MG/DL (ref 8.5–10.1)
CHLORIDE SERPL-SCNC: 100 MMOL/L (ref 94–109)
CO2 SERPL-SCNC: 28 MMOL/L (ref 20–32)
CREAT SERPL-MCNC: 1.35 MG/DL (ref 0.66–1.25)
GFR SERPL CREATININE-BSD FRML MDRD: 54 ML/MIN/1.7M2
GLUCOSE SERPL-MCNC: 155 MG/DL (ref 70–99)
INR PPP: 1.89 (ref 0.86–1.14)
POTASSIUM SERPL-SCNC: 4 MMOL/L (ref 3.4–5.3)
SODIUM SERPL-SCNC: 135 MMOL/L (ref 133–144)

## 2017-10-06 PROCEDURE — 80048 BASIC METABOLIC PNL TOTAL CA: CPT | Performed by: NURSE PRACTITIONER

## 2017-10-06 PROCEDURE — 36415 COLL VENOUS BLD VENIPUNCTURE: CPT | Performed by: NURSE PRACTITIONER

## 2017-10-06 PROCEDURE — 85610 PROTHROMBIN TIME: CPT | Performed by: NURSE PRACTITIONER

## 2017-10-06 RX ORDER — PANTOPRAZOLE SODIUM 40 MG/1
40 TABLET, DELAYED RELEASE ORAL EVERY MORNING
Qty: 30 TABLET | Refills: 5 | Status: SHIPPED | OUTPATIENT
Start: 2017-10-06 | End: 2018-04-09

## 2017-10-06 NOTE — PROGRESS NOTES
ANTICOAGULATION FOLLOW-UP CLINIC VISIT    Patient Name:  Jim Willingham  Date:  10/6/2017  Contact Type:  Telephone    SUBJECTIVE:     Patient Findings     Comments Jim will continue with ASA 325mg daily.  Cefdinir will be done on 10/10.           OBJECTIVE    INR   Date Value Ref Range Status   10/06/2017 1.89 (H) 0.86 - 1.14 Final       ASSESSMENT / PLAN  INR assessment SUB    Recheck INR In: 3 DAYS    INR Location Clinic      Anticoagulation Summary as of 10/6/2017     INR goal 2.0-3.0   Today's INR 1.89!   Maintenance plan 10 mg (5 mg x 2) on Mon, Wed, Fri; 7.5 mg (5 mg x 1.5) all other days   Full instructions 10/6: 12.5 mg; 10/7: 12.5 mg; 10/8: 10 mg; Otherwise 10 mg on Mon, Wed, Fri; 7.5 mg all other days   Weekly total 60 mg   Plan last modified Maru Alonso RN (9/22/2017)   Next INR check 10/9/2017   Priority INR   Target end date     Indications   LVAD (left ventricular assist device) present (H) [Z95.811]  Long-term (current) use of anticoagulants [Z79.01] [Z79.01]         Anticoagulation Episode Summary     INR check location     Preferred lab     Send [facility] remind[facility]    Comments HIPPA Forms mailed 6/26/17  Labs drawn either at [facility] or [facility]      Anticoagulation Care Providers     Provider Role Specialty Phone number    Delisa Montgomery MD Responsible Cardiology [phone]            See the Encounter Report to view Anticoagulation Flowsheet and Dosing Calendar (Go to Encounters tab in chart review, and find the Anticoagulation Therapy Visit)    Spoke with Tatyana Alonso, REGINALD

## 2017-10-06 NOTE — MR AVS SNAPSHOT
Jim Willingham   10/6/2017   Anticoagulation Therapy Visit    Description:  62 year old male   Provider:  Maru Alonso, RN   Department:  [facility]           INR as of 10/6/2017     Today's INR 1.89!      Anticoagulation Summary as of 10/6/2017     INR goal 2.0-3.0   Today's INR 1.89!   Full instructions 10/6: 12.5 mg; 10/7: 12.5 mg; 10/8: 10 mg; Otherwise 10 mg on Mon, Wed, Fri; 7.5 mg all other days   Next INR check 10/9/2017    Indications   LVAD (left ventricular assist device) present (H) [Z95.811]  Long-term (current) use of anticoagulants [Z79.01] [Z79.01]         October 2017 Details    Sun Mon Tue Wed Thu Fri Sat     1               2               3               4               5               6      12.5 mg   See details      7      12.5 mg           8      10 mg         9            10               11               12               13               14                 15               16               17               18               19               20               21                 22               23               24               25               26               27               28                 29               30               31                    Date Details   10/06 This INR check       Date of next INR:  10/9/2017         How to take your warfarin dose     To take:  10 mg Take 2 of the 5 mg tablets.    To take:  12.5 mg Take 2.5 of the 5 mg tablets.

## 2017-10-09 ENCOUNTER — ANTICOAGULATION THERAPY VISIT (OUTPATIENT)
Dept: ANTICOAGULATION | Facility: CLINIC | Age: 62
End: 2017-10-09

## 2017-10-09 DIAGNOSIS — Z95.811 LVAD (LEFT VENTRICULAR ASSIST DEVICE) PRESENT (H): ICD-10-CM

## 2017-10-09 DIAGNOSIS — Z79.01 LONG-TERM (CURRENT) USE OF ANTICOAGULANTS: ICD-10-CM

## 2017-10-09 LAB — INR PPP: 2.29 (ref 0.86–1.14)

## 2017-10-09 PROCEDURE — 85610 PROTHROMBIN TIME: CPT | Performed by: INTERNAL MEDICINE

## 2017-10-09 PROCEDURE — 36415 COLL VENOUS BLD VENIPUNCTURE: CPT | Performed by: INTERNAL MEDICINE

## 2017-10-09 NOTE — MR AVS SNAPSHOT
Jim Willingham   10/9/2017   Anticoagulation Therapy Visit    Description:  62 year old male   Provider:  Delisa Hillman RN   Department:  [facility]           INR as of 10/9/2017     Today's INR 2.29      Anticoagulation Summary as of 10/9/2017     INR goal 2.0-3.0   Today's INR 2.29   Full instructions 10 mg on Mon, Wed, Fri; 7.5 mg all other days   Next INR check     Indications   LVAD (left ventricular assist device) present (H) [Z95.811]  Long-term (current) use of anticoagulants [Z79.01] [Z79.01]         October 2017 Details    Sun Mon Tue Wed Thu Fri Sat     1               2               3               4               5               6               7                 8               9      10 mg   See details      10      7.5 mg         11      10 mg         12      7.5 mg         13      10 mg         14      7.5 mg           15      7.5 mg         16      10 mg         17      7.5 mg         18      10 mg         19      7.5 mg         20      10 mg         21      7.5 mg           22      7.5 mg         23      10 mg         24      7.5 mg         25      10 mg         26      7.5 mg         27      10 mg         28      7.5 mg           29      7.5 mg         30      10 mg         31      7.5 mg              Date Details   10/09 This INR check      Date of next INR: No date specified         How to take your warfarin dose     To take:  7.5 mg Take 1.5 of the 5 mg tablets.    To take:  10 mg Take 2 of the 5 mg tablets.

## 2017-10-09 NOTE — PROGRESS NOTES
ANTICOAGULATION FOLLOW-UP CLINIC VISIT    Patient Name:  Jim Willingham  Date:  10/9/2017  Contact Type:  Telephone    SUBJECTIVE:        OBJECTIVE    INR   Date Value Ref Range Status   10/09/2017 2.29 (H) 0.86 - 1.14 Final       ASSESSMENT / PLAN  INR assessment THER    Recheck INR In: 1 WEEK    INR Location Clinic      Anticoagulation Summary as of 10/9/2017     INR goal 2.0-3.0   Today's INR 2.29   Maintenance plan 10 mg (5 mg x 2) on Mon, Wed, Fri; 7.5 mg (5 mg x 1.5) all other days   Full instructions 10 mg on Mon, Wed, Fri; 7.5 mg all other days   Weekly total 60 mg   Plan last modified Maru Alonso RN (9/22/2017)   Next INR check    Priority INR   Target end date     Indications   LVAD (left ventricular assist device) present (H) [Z95.811]  Long-term (current) use of anticoagulants [Z79.01] [Z79.01]         Anticoagulation Episode Summary     INR check location     Preferred lab     Send INR reminders to [facility]    Comments HIPPA Forms mailed 6/26/17  Labs drawn either at [facility] or [facility]      Anticoagulation Care Providers     Provider Role Specialty Phone number    Ulises, Delisa Sprague MD Responsible Cardiology [phone]            See the Encounter Report to view Anticoagulation Flowsheet and Dosing Calendar (Go to Encounters tab in chart review, and find the Anticoagulation Therapy Visit)    Left message for patient with results and dosing recommendations. Asked patient to call back to report any missed doses, falls, signs and symptoms of bleeding or clotting, any changes in health, medication, or diet. Asked patient to call back with any questions or concerns.      Delisa Hillman RN

## 2017-10-16 ENCOUNTER — CARE COORDINATION (OUTPATIENT)
Dept: CARDIOLOGY | Facility: CLINIC | Age: 62
End: 2017-10-16

## 2017-10-16 NOTE — PROGRESS NOTES
Pt called to report that he was seen by his PCP last week due to continued numbness down R arm and in fingers. He is being evaluated for Thoracic outlet syndrome. PCP started neurontin at 300mg po daily with plans to up-titrate to BID next week and TID the following week. He was also started on a 4 day course of prednisone to help with the numbness while the neurontin was taking effect. Pt reports some improvement since starting medications. He is also scheduled for an EMG next week with PCP. Pt feeling well otherwise and denies any questions or complaints.

## 2017-10-17 ENCOUNTER — ANTICOAGULATION THERAPY VISIT (OUTPATIENT)
Dept: ANTICOAGULATION | Facility: CLINIC | Age: 62
End: 2017-10-17

## 2017-10-17 DIAGNOSIS — Z95.811 LVAD (LEFT VENTRICULAR ASSIST DEVICE) PRESENT (H): ICD-10-CM

## 2017-10-17 DIAGNOSIS — Z79.01 LONG-TERM (CURRENT) USE OF ANTICOAGULANTS: ICD-10-CM

## 2017-10-17 NOTE — PROGRESS NOTES
Patient did not have their labs done today. I left a  message for the  patient to get their labs done as soon as possible and call the Anticoagulation Clinic.

## 2017-10-17 NOTE — MR AVS SNAPSHOT
Jim Willingham   10/17/2017   Anticoagulation Therapy Visit    Description:  62 year old male   Provider:  Maged Bedolla, [facility]   Department:  [facility]           INR as of 10/17/2017     Today's INR       Anticoagulation Summary as of 10/17/2017     INR goal 2.0-3.0   Today's INR    Full instructions 10 mg on Mon, Wed, Fri; 7.5 mg all other days   Next INR check 10/17/2017    Indications   LVAD (left ventricular assist device) present (H) [Z95.811]  Long-term (current) use of anticoagulants [Z79.01] [Z79.01]         October 2017 Details    Sun Mon Tue Wed Thu Fri Sat     1               2               3               4               5               6               7                 8               9               10               11               12               13               14                 15               16               17      See details      18               19               20               21                 22               23               24               25               26               27               28                 29               30               31                    Date Details   10/17 This INR check       Date of next INR:  10/17/2017         How to take your warfarin dose     To take:  7.5 mg Take 1.5 of the 5 mg tablets.

## 2017-10-23 ENCOUNTER — ANTICOAGULATION THERAPY VISIT (OUTPATIENT)
Dept: ANTICOAGULATION | Facility: CLINIC | Age: 62
End: 2017-10-23

## 2017-10-23 DIAGNOSIS — Z79.01 LONG-TERM (CURRENT) USE OF ANTICOAGULANTS: ICD-10-CM

## 2017-10-23 DIAGNOSIS — Z95.811 LVAD (LEFT VENTRICULAR ASSIST DEVICE) PRESENT (H): ICD-10-CM

## 2017-10-23 LAB — INR PPP: 2.18 (ref 0.86–1.14)

## 2017-10-23 PROCEDURE — 85610 PROTHROMBIN TIME: CPT | Performed by: INTERNAL MEDICINE

## 2017-10-23 PROCEDURE — 36415 COLL VENOUS BLD VENIPUNCTURE: CPT | Performed by: INTERNAL MEDICINE

## 2017-10-23 NOTE — PROGRESS NOTES
ANTICOAGULATION FOLLOW-UP CLINIC VISIT    Patient Name:  Jim Willingham  Date:  10/23/2017  Contact Type:  Telephone    SUBJECTIVE:        OBJECTIVE    INR   Date Value Ref Range Status   10/23/2017 2.18 (H) 0.86 - 1.14 Final       ASSESSMENT / PLAN  INR assessment THER    Recheck INR In: 1 WEEK    INR Location Clinic      Anticoagulation Summary as of 10/23/2017     INR goal 2.0-3.0   Today's INR 2.18   Maintenance plan 7.5 mg (5 mg x 1.5) on Sun, Tue, Thu; 10 mg (5 mg x 2) all other days   Full instructions 7.5 mg on Sun, Tue, Thu; 10 mg all other days   Weekly total 62.5 mg   Plan last modified Maru Alonso RN (10/23/2017)   Next INR check 10/30/2017   Priority INR   Target end date     Indications   LVAD (left ventricular assist device) present (H) [Z95.811]  Long-term (current) use of anticoagulants [Z79.01] [Z79.01]         Anticoagulation Episode Summary     INR check location     Preferred lab     Send INR reminders to [facility]    Comments HIPPA Forms mailed 6/26/17  Labs drawn either at [facility] or [facility]      Anticoagulation Care Providers     Provider Role Specialty Phone number    Delisa Montgomery MD Responsible Cardiology [phone]            See the Encounter Report to view Anticoagulation Flowsheet and Dosing Calendar (Go to Encounters tab in chart review, and find the Anticoagulation Therapy Visit)    Left message for patient with results and dosing recommendations. Asked patient to call back to report any missed doses, falls, signs and symptoms of bleeding or clotting, any changes in health, medication, or diet. Asked patient to call back with any questions or concerns.  Instructions were given to resume ASA 81mg daily.    Maru Alonso RN

## 2017-10-23 NOTE — MR AVS SNAPSHOT
Jim Willingham   10/23/2017   Anticoagulation Therapy Visit    Description:  62 year old male   Provider:  Maru Alonso, RN   [facility]           INR as of 10/23/2017     Today's INR 2.18      Anticoagulation Summary as of 10/23/2017     INR goal 2.0-3.0   Today's INR 2.18   Full instructions 7.5 mg on Sun, Tue, Thu; 10 mg all other days   Next INR check 10/30/2017    Indications   LVAD (left ventricular assist device) present (H) [Z95.811]  Long-term (current) use of anticoagulants [Z79.01] [Z79.01]         October 2017 Details    Sun Mon Tue Wed Thu Fri Sat     1               2               3               4               5               6               7                 8               9               10               11               12               13               14                 15               16               17               18               19               20               21                 22               23      10 mg   See details      24      7.5 mg         25      10 mg         26      7.5 mg         27      10 mg         28      10 mg           29      7.5 mg         30            31                    Date Details   10/23 This INR check       Date of next INR:  10/30/2017         How to take your warfarin dose     To take:  7.5 mg Take 1.5 of the 5 mg tablets.    To take:  10 mg Take 2 of the 5 mg tablets.

## 2017-10-24 ENCOUNTER — TRANSFERRED RECORDS (OUTPATIENT)
Dept: HEALTH INFORMATION MANAGEMENT | Facility: CLINIC | Age: 62
End: 2017-10-24

## 2017-10-30 ENCOUNTER — ANTICOAGULATION THERAPY VISIT (OUTPATIENT)
Dept: ANTICOAGULATION | Facility: CLINIC | Age: 62
End: 2017-10-30

## 2017-10-30 ENCOUNTER — CARE COORDINATION (OUTPATIENT)
Dept: CARDIOLOGY | Facility: CLINIC | Age: 62
End: 2017-10-30

## 2017-10-30 DIAGNOSIS — Z95.811 LVAD (LEFT VENTRICULAR ASSIST DEVICE) PRESENT (H): ICD-10-CM

## 2017-10-30 DIAGNOSIS — Z79.01 LONG-TERM (CURRENT) USE OF ANTICOAGULANTS: ICD-10-CM

## 2017-10-30 LAB — INR PPP: 2.13 (ref 0.86–1.14)

## 2017-10-30 PROCEDURE — 36415 COLL VENOUS BLD VENIPUNCTURE: CPT | Performed by: INTERNAL MEDICINE

## 2017-10-30 PROCEDURE — 85610 PROTHROMBIN TIME: CPT | Performed by: INTERNAL MEDICINE

## 2017-10-30 NOTE — PROGRESS NOTES
Called pt to check in 16 weeks post d/c. Pt did not answer phone, left voicemail instructing pt to call back with any questions or concerns.

## 2017-10-30 NOTE — MR AVS SNAPSHOT
Jim Willingham   10/30/2017   Anticoagulation Therapy Visit    Description:  62 year old male   Provider:  Brendan Montoya, RN   Department:  [facility]           INR as of 10/30/2017     Today's INR 2.13      Anticoagulation Summary as of 10/30/2017     INR goal 2.0-3.0   Today's INR 2.13   Full instructions 7.5 mg on Sun, Tue; 10 mg all other days   Next INR check 11/3/2017    Indications   LVAD (left ventricular assist device) present (H) [Z95.811]  Long-term (current) use of anticoagulants [Z79.01] [Z79.01]         October 2017 Details    Sun Mon Tue Wed Thu Fri Sat     1               2               3               4               5               6               7                 8               9               10               11               12               13               14                 15               16               17               18               19               20               21                 22               23               24               25               26               27               28                 29               30      10 mg   See details      31      7.5 mg              Date Details   10/30 This INR check               How to take your warfarin dose     To take:  7.5 mg Take 1.5 of the 5 mg tablets.    To take:  10 mg Take 2 of the 5 mg tablets.           November 2017 Details    Sun Mon Tue Wed Thu Fri Sat        1      10 mg         2      10 mg         3            4                 5               6               7               8               9               10               11                 12               13               14               15               16               17               18                 19               20               21               22               23               24               25                 26               27               28               29               30                  Date Details   No additional  details    Date of next INR:  11/3/2017         How to take your warfarin dose     To take:  10 mg Take 2 of the 5 mg tablets.

## 2017-10-30 NOTE — PROGRESS NOTES
ANTICOAGULATION FOLLOW-UP CLINIC VISIT    Patient Name:  Jim Willingham  Date:  10/30/2017  Contact Type:  Telephone    SUBJECTIVE:     Patient Findings     Positives No Problem Findings    Comments Discussed with Jim the INR result history.  He has trended on the low side of goal range for several draws.  Will try to include another dose of 10mg weekly.  Requested patient take 7.5mg on Tuesday and recheck on Friday.  Patient continues to take 81mg of Aspirin.  Will continue to request a venipuncture draw for consistency.           OBJECTIVE    INR   Date Value Ref Range Status   10/30/2017 2.13 (H) 0.86 - 1.14 Final       ASSESSMENT / PLAN  INR assessment THER    Recheck INR In: 4 DAYS    INR Location Clinic      Anticoagulation Summary as of 10/30/2017     INR goal 2.0-3.0   Today's INR 2.13   Maintenance plan 7.5 mg (5 mg x 1.5) on Sun, Tue; 10 mg (5 mg x 2) all other days   Full instructions 7.5 mg on Sun, Tue; 10 mg all other days   Weekly total 65 mg   Plan last modified Brendan Montoya RN (10/30/2017)   Next INR check 11/3/2017   Priority INR   Target end date     Indications   LVAD (left ventricular assist device) present (H) [Z95.811]  Long-term (current) use of anticoagulants [Z79.01] [Z79.01]         Anticoagulation Episode Summary     INR check location     Preferred lab     Send INR reminders to [facility]    Comments HIPPA Forms mailed 6/26/17  Labs drawn either at [facility] or [facility]      Anticoagulation Care Providers     Provider Role Specialty Phone number    Delisa Montgomery MD Responsible Cardiology [phone]            See the Encounter Report to view Anticoagulation Flowsheet and Dosing Calendar (Go to Encounters tab in chart review, and find the Anticoagulation Therapy Visit)    Spoke with patient. Gave them their lab results and new warfarin recommendation.  No changes in health, medication, or diet. No missed doses, no falls. No signs or symptoms of  bleed or clotting.    Brendan Montoya, RN

## 2017-10-31 ENCOUNTER — CARE COORDINATION (OUTPATIENT)
Dept: CARDIOLOGY | Facility: CLINIC | Age: 62
End: 2017-10-31

## 2017-10-31 DIAGNOSIS — R20.9 DISTURBANCE OF SKIN SENSATION: Primary | ICD-10-CM

## 2017-10-31 DIAGNOSIS — Z95.811 LVAD (LEFT VENTRICULAR ASSIST DEVICE) PRESENT (H): Primary | ICD-10-CM

## 2017-10-31 DIAGNOSIS — I50.22 CHRONIC SYSTOLIC CONGESTIVE HEART FAILURE (H): ICD-10-CM

## 2017-10-31 RX ORDER — WARFARIN SODIUM 5 MG/1
10 TABLET ORAL DAILY
Qty: 60 TABLET | Refills: 5 | Status: SHIPPED | OUTPATIENT
Start: 2017-10-31 | End: 2018-04-23

## 2017-10-31 NOTE — PROGRESS NOTES
Called patient/caregiver to check in 16 weeks post discharge. Pt reports VAD parameters WNL and weight stable (increasing, but verifies that it is table weight rather than volume). Reviewed medications and answered any questions. Patient reports sleeping well and no anxiety since being home with LVAD. Patient is able to move around the house and care for himself independently.     Discussed specific new problems/stressors since being discharged from the hospital: feels he is doing quite well. Reported that he does have good days and bad days, but more of the good days. Requested refill of 5mg coumadin tabs (usually takes 7.5-10mg daily). Empathized with patient and reviewed coping strategies: enlisting support from friends and love ones, attending patient and caregiver support groups, reviewing LVAD educational materials to reinforce knowledge, and talking about concerns with family/care providers/trusted others. Encouraged pt to page VAD Coordinator with any issues or questions. Pt verbalizes understanding.

## 2017-11-03 ENCOUNTER — ANTICOAGULATION THERAPY VISIT (OUTPATIENT)
Dept: ANTICOAGULATION | Facility: CLINIC | Age: 62
End: 2017-11-03

## 2017-11-03 DIAGNOSIS — Z79.01 LONG-TERM (CURRENT) USE OF ANTICOAGULANTS: ICD-10-CM

## 2017-11-03 DIAGNOSIS — Z95.811 LVAD (LEFT VENTRICULAR ASSIST DEVICE) PRESENT (H): ICD-10-CM

## 2017-11-03 LAB — INR BLD: 2.8 (ref 0.86–1.14)

## 2017-11-03 PROCEDURE — 85610 PROTHROMBIN TIME: CPT | Mod: QW | Performed by: INTERNAL MEDICINE

## 2017-11-03 PROCEDURE — 36416 COLLJ CAPILLARY BLOOD SPEC: CPT | Performed by: INTERNAL MEDICINE

## 2017-11-03 NOTE — MR AVS SNAPSHOT
Jim Willingham   11/3/2017   Anticoagulation Therapy Visit    Description:  62 year old male   Provider:  Delisa Hillman, RN   Department:  [facility]           INR as of 11/3/2017     Today's INR 2.8      Anticoagulation Summary as of 11/3/2017     INR goal 2.0-3.0   Today's INR 2.8   Full instructions 7.5 mg on Sun, Tue, Thu; 10 mg all other days   Next INR check 11/10/2017    Indications   LVAD (left ventricular assist device) present (H) [Z95.811]  Long-term (current) use of anticoagulants [Z79.01] [Z79.01]         November 2017 Details    Sun Mon Tue Wed Thu Fri Sat        1               2               3      10 mg   See details      4      10 mg           5      7.5 mg         6      10 mg         7      7.5 mg         8      10 mg         9      7.5 mg         10            11                 12               13               14               15               16               17               18                 19               20               21               22               23               24               25                 26               27               28               29               30                  Date Details   11/03 This INR check       Date of next INR:  11/10/2017         How to take your warfarin dose     To take:  7.5 mg Take 1.5 of the 5 mg tablets.    To take:  10 mg Take 2 of the 5 mg tablets.

## 2017-11-03 NOTE — PROGRESS NOTES
ANTICOAGULATION FOLLOW-UP CLINIC VISIT    Patient Name:  Jim Willingham  Date:  11/3/2017  Contact Type:  Telephone    SUBJECTIVE:     Patient Findings     Positives No Problem Findings           OBJECTIVE    INR Point of Care   Date Value Ref Range Status   11/03/2017 2.8 (H) 0.86 - 1.14 Final     Comment:     This test is intended for monitoring Coumadin therapy.  Results are not   accurate in patients with prolonged INR due to factor deficiency.         ASSESSMENT / PLAN  INR assessment THER    Recheck INR In: 1 WEEK    INR Location Clinic      Anticoagulation Summary as of 11/3/2017     INR goal 2.0-3.0   Today's INR 2.8   Maintenance plan 7.5 mg (5 mg x 1.5) on Sun, Tue, Thu; 10 mg (5 mg x 2) all other days   Full instructions 7.5 mg on Sun, Tue, Thu; 10 mg all other days   Weekly total 62.5 mg   Plan last modified Delisa Hillman RN (11/3/2017)   Next INR check 11/10/2017   Priority INR   Target end date     Indications   LVAD (left ventricular assist device) present (H) [Z95.811]  Long-term (current) use of anticoagulants [Z79.01] [Z79.01]         Anticoagulation Episode Summary     INR check location     Preferred lab     Send INR reminders to [facility]    Comments HIPPA Forms mailed 6/26/17  Labs drawn either at [facility] or [facility]      Anticoagulation Care Providers     Provider Role Specialty Phone number    Ulises Delisa Sprague MD Responsible Cardiology [phone]            See the Encounter Report to view Anticoagulation Flowsheet and Dosing Calendar (Go to Encounters tab in chart review, and find the Anticoagulation Therapy Visit)    Spoke with patient.    Delisa Hillman RN

## 2017-11-07 ENCOUNTER — CARE COORDINATION (OUTPATIENT)
Dept: CARDIOLOGY | Facility: CLINIC | Age: 62
End: 2017-11-07

## 2017-11-09 ENCOUNTER — ANTICOAGULATION THERAPY VISIT (OUTPATIENT)
Dept: ANTICOAGULATION | Facility: CLINIC | Age: 62
End: 2017-11-09

## 2017-11-09 DIAGNOSIS — Z79.01 LONG-TERM (CURRENT) USE OF ANTICOAGULANTS: ICD-10-CM

## 2017-11-09 DIAGNOSIS — Z95.811 LVAD (LEFT VENTRICULAR ASSIST DEVICE) PRESENT (H): ICD-10-CM

## 2017-11-09 LAB — INR BLD: 2.6 (ref 0.86–1.14)

## 2017-11-09 PROCEDURE — 85610 PROTHROMBIN TIME: CPT | Mod: QW | Performed by: INTERNAL MEDICINE

## 2017-11-09 PROCEDURE — 36416 COLLJ CAPILLARY BLOOD SPEC: CPT | Performed by: INTERNAL MEDICINE

## 2017-11-09 NOTE — MR AVS SNAPSHOT
Jim Willingham   11/9/2017   Anticoagulation Therapy Visit    Description:  62 year old male   Provider:  Brendan Montoya, RN   [facility]           INR as of 11/9/2017     Today's INR 2.6      Anticoagulation Summary as of 11/9/2017     INR goal 2.0-3.0   Today's INR 2.6   Full instructions 7.5 mg on Sun, Tue, Thu; 10 mg all other days   Next INR check 11/16/2017    Indications   LVAD (left ventricular assist device) present (H) [Z95.811]  Long-term (current) use of anticoagulants [Z79.01] [Z79.01]         November 2017 Details    Sun Mon Tue Wed Thu Fri Sat        1               2               3               4                 5               6               7               8               9      7.5 mg   See details      10      10 mg         11      10 mg           12      7.5 mg         13      10 mg         14      7.5 mg         15      10 mg         16            17               18                 19               20               21               22               23               24               25                 26               27               28               29               30                  Date Details   11/09 This INR check       Date of next INR:  11/16/2017         How to take your warfarin dose     To take:  7.5 mg Take 1.5 of the 5 mg tablets.    To take:  10 mg Take 2 of the 5 mg tablets.

## 2017-11-09 NOTE — PROGRESS NOTES
ANTICOAGULATION FOLLOW-UP CLINIC VISIT    Patient Name:  Jim Willingham  Date:  11/9/2017  Contact Type:  Telephone    SUBJECTIVE:        OBJECTIVE    INR Point of Care   Date Value Ref Range Status   11/09/2017 2.6 (H) 0.86 - 1.14 Final     Comment:     This test is intended for monitoring Coumadin therapy.  Results are not   accurate in patients with prolonged INR due to factor deficiency.         ASSESSMENT / PLAN  INR assessment THER    Recheck INR In: 1 WEEK    INR Location Clinic      Anticoagulation Summary as of 11/9/2017     INR goal 2.0-3.0   Today's INR 2.6   Maintenance plan 7.5 mg (5 mg x 1.5) on Sun, Tue, Thu; 10 mg (5 mg x 2) all other days   Full instructions 7.5 mg on Sun, Tue, Thu; 10 mg all other days   Weekly total 62.5 mg   No change documented Brendan Montoya RN   Plan last modified Delisa Hillman RN (11/3/2017)   Next INR check 11/16/2017   Priority INR   Target end date     Indications   LVAD (left ventricular assist device) present (H) [Z95.811]  Long-term (current) use of anticoagulants [Z79.01] [Z79.01]         Anticoagulation Episode Summary     INR check location     Preferred lab     Send INR reminders to [facility]    Comments HIPPA Forms mailed 6/26/17  Labs drawn either at [facility] or [facility]      Anticoagulation Care Providers     Provider Role Specialty Phone number    Delisa Montgomery MD Responsible Cardiology [phone]            See the Encounter Report to view Anticoagulation Flowsheet and Dosing Calendar (Go to Encounters tab in chart review, and find the Anticoagulation Therapy Visit)    Patient is on 81mg of aspirin.  Did not adjust his coumadin.  Left message for patient with results and dosing recommendations. Asked patient to call back to report any missed doses, falls, signs and symptoms of bleeding or clotting, any changes in health, medication, or diet. Asked patient to call back with any questions or concerns.  Brendan Montoya  WILBERT, RN

## 2017-11-13 NOTE — PROGRESS NOTES
Called pt to check in. Pt reported he continues to have severe pain and numbness in bilateral hands and arms. His PCP has been treating for this and ordered and EMG that revealed carpal tunnel. He was referred to neurosurgery. Conferred with Dr. Montgomery and placed a consult for pt to have neurosurgery consult at the [facility]. Gave pt the contact information for the neurology clinic.

## 2017-11-16 ENCOUNTER — ANTICOAGULATION THERAPY VISIT (OUTPATIENT)
Dept: ANTICOAGULATION | Facility: CLINIC | Age: 62
End: 2017-11-16

## 2017-11-16 DIAGNOSIS — Z95.811 LVAD (LEFT VENTRICULAR ASSIST DEVICE) PRESENT (H): ICD-10-CM

## 2017-11-16 DIAGNOSIS — Z79.01 LONG-TERM (CURRENT) USE OF ANTICOAGULANTS: ICD-10-CM

## 2017-11-16 LAB — INR BLD: 2.9 (ref 0.86–1.14)

## 2017-11-16 PROCEDURE — 85610 PROTHROMBIN TIME: CPT | Mod: QW | Performed by: INTERNAL MEDICINE

## 2017-11-16 PROCEDURE — 36416 COLLJ CAPILLARY BLOOD SPEC: CPT | Performed by: INTERNAL MEDICINE

## 2017-11-16 NOTE — PROGRESS NOTES
ANTICOAGULATION FOLLOW-UP CLINIC VISIT    Patient Name:  Jim Willingham  Date:  11/16/2017  Contact Type:  Telephone    SUBJECTIVE:     Patient Findings     Positives No Problem Findings           OBJECTIVE    INR Point of Care   Date Value Ref Range Status   11/16/2017 2.9 (H) 0.86 - 1.14 Final     Comment:     This test is intended for monitoring Coumadin therapy.  Results are not   accurate in patients with prolonged INR due to factor deficiency.         ASSESSMENT / PLAN  INR assessment THER    Recheck INR In: 6 DAYS    INR Location Clinic      Anticoagulation Summary as of 11/16/2017     INR goal 2.0-3.0   Today's INR 2.9   Maintenance plan 7.5 mg (5 mg x 1.5) on Sun, Tue, Thu; 10 mg (5 mg x 2) all other days   Full instructions 7.5 mg on Sun, Tue, Thu; 10 mg all other days   Weekly total 62.5 mg   No change documented Brendan Montoya RN   Plan last modified Delisa Hillman RN (11/3/2017)   Next INR check 11/22/2017   Priority INR   Target end date     Indications   LVAD (left ventricular assist device) present (H) [Z95.811]  Long-term (current) use of anticoagulants [Z79.01] [Z79.01]         Anticoagulation Episode Summary     INR check location     Preferred lab     Send INR reminders to [facility]    Comments HIPPA Forms mailed 6/26/17  Labs drawn either at [facility] or [facility]      Anticoagulation Care Providers     Provider Role Specialty Phone number    Delisa Montgomery MD Responsible Cardiology [phone]            See the Encounter Report to view Anticoagulation Flowsheet and Dosing Calendar (Go to Encounters tab in chart review, and find the Anticoagulation Therapy Visit)    Spoke with patient. Gave them their lab results and new warfarin recommendation.  No changes in health, medication, or diet. No missed doses, no falls. No signs or symptoms of bleed or clotting.    Brendan Montoya RN

## 2017-11-16 NOTE — MR AVS SNAPSHOT
Jim Willingham   11/16/2017   Anticoagulation Therapy Visit    Description:  62 year old male   Provider:  Brendan Montoya, RN   [facility]           INR as of 11/16/2017     Today's INR 2.9      Anticoagulation Summary as of 11/16/2017     INR goal 2.0-3.0   Today's INR 2.9   Full instructions 7.5 mg on Sun, Tue, Thu; 10 mg all other days   Next INR check 11/22/2017    Indications   LVAD (left ventricular assist device) present (H) [Z95.811]  Long-term (current) use of anticoagulants [Z79.01] [Z79.01]         November 2017 Details    Sun Mon Tue Wed Thu Fri Sat        1               2               3               4                 5               6               7               8               9               10               11                 12               13               14               15               16      7.5 mg   See details      17      10 mg         18      10 mg           19      7.5 mg         20      10 mg         21      7.5 mg         22            23               24               25                 26               27               28               29               30                  Date Details   11/16 This INR check       Date of next INR:  11/22/2017         How to take your warfarin dose     To take:  7.5 mg Take 1.5 of the 5 mg tablets.    To take:  10 mg Take 2 of the 5 mg tablets.

## 2017-11-22 ENCOUNTER — ANTICOAGULATION THERAPY VISIT (OUTPATIENT)
Dept: ANTICOAGULATION | Facility: CLINIC | Age: 62
End: 2017-11-22

## 2017-11-22 DIAGNOSIS — Z95.811 LVAD (LEFT VENTRICULAR ASSIST DEVICE) PRESENT (H): ICD-10-CM

## 2017-11-22 DIAGNOSIS — Z79.01 LONG-TERM (CURRENT) USE OF ANTICOAGULANTS: ICD-10-CM

## 2017-11-22 LAB — INR BLD: 1.9 (ref 0.86–1.14)

## 2017-11-22 PROCEDURE — 85610 PROTHROMBIN TIME: CPT | Mod: QW | Performed by: INTERNAL MEDICINE

## 2017-11-22 PROCEDURE — 36416 COLLJ CAPILLARY BLOOD SPEC: CPT | Performed by: INTERNAL MEDICINE

## 2017-11-22 NOTE — PROGRESS NOTES
ANTICOAGULATION FOLLOW-UP CLINIC VISIT    Patient Name:  Jim Willingham  Date:  11/22/2017  Contact Type:  Telephone    SUBJECTIVE:     Patient Findings     Positives Unexplained INR or factor level change           OBJECTIVE    INR Point of Care   Date Value Ref Range Status   11/22/2017 1.9 (H) 0.86 - 1.14 Final     Comment:     This test is intended for monitoring Coumadin therapy.  Results are not   accurate in patients with prolonged INR due to factor deficiency.         ASSESSMENT / PLAN  INR assessment THER    Recheck INR In: 2 DAYS    INR Location Clinic      Anticoagulation Summary as of 11/22/2017     INR goal 2.0-3.0   Today's INR 1.9!   Maintenance plan 7.5 mg (5 mg x 1.5) on Sun, Tue, Thu; 10 mg (5 mg x 2) all other days   Full instructions 11/22: 12.5 mg; 11/23: 10 mg; Otherwise 7.5 mg on Sun, Tue, Thu; 10 mg all other days   Weekly total 62.5 mg   Plan last modified Delisa Hillman RN (11/3/2017)   Next INR check 11/24/2017   Priority INR   Target end date     Indications   LVAD (left ventricular assist device) present (H) [Z95.811]  Long-term (current) use of anticoagulants [Z79.01] [Z79.01]         Anticoagulation Episode Summary     INR check location     Preferred lab     Send INR reminders to [facility]    Comments HIPPA Forms mailed 6/26/17  Labs drawn either at [facility] or [facility]      Anticoagulation Care Providers     Provider Role Specialty Phone number    Delisa Montgomery MD Responsible Cardiology [phone]            See the Encounter Report to view Anticoagulation Flowsheet and Dosing Calendar (Go to Encounters tab in chart review, and find the Anticoagulation Therapy Visit)    Left message for patient with results and dosing recommendations. Asked patient to call back to report any missed doses, falls, signs and symptoms of bleeding or clotting, any changes in health, medication, or diet. Asked patient to call back with any questions or concerns.    Instructed to increase ASA to 325 mg daily until INR is therapeutic.    Kirsty Bermudez RN

## 2017-11-22 NOTE — MR AVS SNAPSHOT
Jim Willingham   11/22/2017   Anticoagulation Therapy Visit    Description:  62 year old male   Provider:  Kirsty Bermudez, RN   Department:  [facility]           INR as of 11/22/2017     Today's INR 1.9!      Anticoagulation Summary as of 11/22/2017     INR goal 2.0-3.0   Today's INR 1.9!   Full instructions 11/22: 12.5 mg; 11/23: 10 mg; Otherwise 7.5 mg on Sun, Tue, Thu; 10 mg all other days   Next INR check 11/24/2017    Indications   LVAD (left ventricular assist device) present (H) [Z95.811]  Long-term (current) use of anticoagulants [Z79.01] [Z79.01]         November 2017 Details    Sun Mon Tue Wed Thu Fri Sat        1               2               3               4                 5               6               7               8               9               10               11                 12               13               14               15               16               17               18                 19               20               21               22      12.5 mg   See details      23      10 mg         24            25                 26               27               28               29               30                  Date Details   11/22 This INR check       Date of next INR:  11/24/2017         How to take your warfarin dose     To take:  10 mg Take 2 of the 5 mg tablets.    To take:  12.5 mg Take 2.5 of the 5 mg tablets.

## 2017-11-24 NOTE — PROGRESS NOTES
11/24/17 Pt forgot to get an INR today and will get one on Monday 11/27. Pt thinks that the reason his INR went below his goal range was due to drinking Kombucha and one of the ingredients was javi greens. Writer explained that this could have made his INR decrease since javi greens has Vit K in it. Pt reports he stopped drinking these. We will f/u with pt on Monday when he comes into get an INR. Radha Pedro RN

## 2017-11-27 ENCOUNTER — ANTICOAGULATION THERAPY VISIT (OUTPATIENT)
Dept: ANTICOAGULATION | Facility: CLINIC | Age: 62
End: 2017-11-27

## 2017-11-27 ENCOUNTER — CARE COORDINATION (OUTPATIENT)
Dept: CARDIOLOGY | Facility: CLINIC | Age: 62
End: 2017-11-27

## 2017-11-27 DIAGNOSIS — Z95.811 LVAD (LEFT VENTRICULAR ASSIST DEVICE) PRESENT (H): ICD-10-CM

## 2017-11-27 DIAGNOSIS — Z79.01 LONG-TERM (CURRENT) USE OF ANTICOAGULANTS: ICD-10-CM

## 2017-11-27 LAB — INR BLD: 2.2 (ref 0.86–1.14)

## 2017-11-27 PROCEDURE — 36416 COLLJ CAPILLARY BLOOD SPEC: CPT | Performed by: INTERNAL MEDICINE

## 2017-11-27 PROCEDURE — 85610 PROTHROMBIN TIME: CPT | Mod: QW | Performed by: INTERNAL MEDICINE

## 2017-11-27 NOTE — PROGRESS NOTES
Called patient/caregiver to check in 20 weeks post discharge. Pt reports VAD parameters WNL and weight stable. Reviewed medications and answered any questions. Patient reports sleeping well and no anxiety since being home with LVAD. Patient is able to move around the house and care for himself independently.     Discussed specific new problems/stressors since being discharged from the hospital: working with neurology to get relief from carpal tunnel pain. Empathized with patient and reviewed coping strategies: enlisting support from friends and love ones, attending patient and caregiver support groups, reviewing LVAD educational materials to reinforce knowledge, and talking about concerns with family/care providers/trusted others. Encouraged pt to page VAD Coordinator with any issues or questions. Pt verbalizes understanding.

## 2017-11-27 NOTE — MR AVS SNAPSHOT
Jim Willingham   11/27/2017   Anticoagulation Therapy Visit    Description:  62 year old male   Provider:  Kirsty Bermudez, RN   Department:  [facility]           INR as of 11/27/2017     Today's INR 2.2      Anticoagulation Summary as of 11/27/2017     INR goal 2.0-3.0   Today's INR 2.2   Full instructions 7.5 mg on Sun, Tue, Thu; 10 mg all other days   Next INR check 12/4/2017    Indications   LVAD (left ventricular assist device) present (H) [Z95.811]  Long-term (current) use of anticoagulants [Z79.01] [Z79.01]         November 2017 Details    Sun Mon Tue Wed Thu Fri Sat        1               2               3               4                 5               6               7               8               9               10               11                 12               13               14               15               16               17               18                 19               20               21               22               23               24               25                 26               27      10 mg   See details      28      7.5 mg         29      10 mg         30      7.5 mg            Date Details   11/27 This INR check               How to take your warfarin dose     To take:  7.5 mg Take 1.5 of the 5 mg tablets.    To take:  10 mg Take 2 of the 5 mg tablets.           December 2017 Details    Sun Mon Tue Wed Thu Fri Sat          1      10 mg         2      10 mg           3      7.5 mg         4            5               6               7               8               9                 10               11               12               13               14               15               16                 17               18               19               20               21               22               23                 24               25               26               27               28               29               30                 31                       Date Details   No additional details    Date of next INR:  12/4/2017         How to take your warfarin dose     To take:  7.5 mg Take 1.5 of the 5 mg tablets.    To take:  10 mg Take 2 of the 5 mg tablets.

## 2017-11-27 NOTE — PROGRESS NOTES
ANTICOAGULATION FOLLOW-UP CLINIC VISIT    Patient Name:  Jim Willingham  Date:  11/27/2017  Contact Type:  Telephone    SUBJECTIVE:     Patient Findings     Comments Jim had started drinking Kombucha with javi greens in it when his INR dropped.  He is not drinking this anymore.           OBJECTIVE    INR Point of Care   Date Value Ref Range Status   11/27/2017 2.2 (H) 0.86 - 1.14 Final     Comment:     This test is intended for monitoring Coumadin therapy.  Results are not   accurate in patients with prolonged INR due to factor deficiency.         ASSESSMENT / PLAN  INR assessment THER    Recheck INR In: 1 WEEK    INR Location Clinic      Anticoagulation Summary as of 11/27/2017     INR goal 2.0-3.0   Today's INR 2.2   Maintenance plan 7.5 mg (5 mg x 1.5) on Sun, Tue, Thu; 10 mg (5 mg x 2) all other days   Full instructions 7.5 mg on Sun, Tue, Thu; 10 mg all other days   Weekly total 62.5 mg   No change documented Kirsty Bermudez RN   Plan last modified Delisa Hillman RN (11/3/2017)   Next INR check 12/4/2017   Priority INR   Target end date     Indications   LVAD (left ventricular assist device) present (H) [Z95.811]  Long-term (current) use of anticoagulants [Z79.01] [Z79.01]         Anticoagulation Episode Summary     INR check location     Preferred lab     Send INR reminders to [facility]    Comments HIPPA Forms mailed 6/26/17  Labs drawn either at [facility] or [facility]      Anticoagulation Care Providers     Provider Role Specialty Phone number    Delisa Montgomery MD [facility] Cardiology [phone]            See the Encounter Report to view Anticoagulation Flowsheet and Dosing Calendar (Go to Encounters tab in chart review, and find the Anticoagulation Therapy Visit)    Spoke with Jim.  He has decreased his ASA dose back to 81 mg daily.      Kirsty Bermudez RN

## 2017-12-04 ENCOUNTER — ANTICOAGULATION THERAPY VISIT (OUTPATIENT)
Dept: ANTICOAGULATION | Facility: CLINIC | Age: 62
End: 2017-12-04

## 2017-12-04 DIAGNOSIS — Z95.811 LVAD (LEFT VENTRICULAR ASSIST DEVICE) PRESENT (H): ICD-10-CM

## 2017-12-04 DIAGNOSIS — Z79.01 LONG-TERM (CURRENT) USE OF ANTICOAGULANTS: ICD-10-CM

## 2017-12-04 LAB — INR BLD: 3.9 (ref 0.86–1.14)

## 2017-12-04 PROCEDURE — 36416 COLLJ CAPILLARY BLOOD SPEC: CPT | Performed by: INTERNAL MEDICINE

## 2017-12-04 PROCEDURE — 85610 PROTHROMBIN TIME: CPT | Mod: QW | Performed by: INTERNAL MEDICINE

## 2017-12-04 NOTE — MR AVS SNAPSHOT
Jim Willingham   12/4/2017   Anticoagulation Therapy Visit    Description:  62 year old male   Provider:  Maru Alonso, RN   Department:  [facility]           INR as of 12/4/2017     Today's INR 3.9!      Anticoagulation Summary as of 12/4/2017     INR goal 2.0-3.0   Today's INR 3.9!   Full instructions 12/4: 5 mg; 12/5: 7.5 mg; 12/6: 10 mg   Next INR check 12/7/2017    Indications   LVAD (left ventricular assist device) present (H) [Z95.811]  Long-term (current) use of anticoagulants [Z79.01] [Z79.01]         December 2017 Details    Sun Mon Tue Wed Thu Fri Sat          1               2                 3               4      5 mg   See details      5      7.5 mg         6      10 mg         7            8               9                 10               11               12               13               14               15               16                 17               18               19               20               21               22               23                 24               25               26               27               28               29               30                 31                      Date Details   12/04 This INR check       Date of next INR:  12/7/2017         How to take your warfarin dose     To take:  5 mg Take 1 of the 5 mg tablets.    To take:  7.5 mg Take 1.5 of the 5 mg tablets.    To take:  10 mg Take 2 of the 5 mg tablets.

## 2017-12-04 NOTE — PROGRESS NOTES
ANTICOAGULATION FOLLOW-UP CLINIC VISIT    Patient Name:  Jim Willingham  Date:  12/4/2017  Contact Type:  Telephone    SUBJECTIVE:     Patient Findings     Comments Jim reports that he was drinking a green drink for immunity and then stopped. He doesn't plan on restarting the drink.           OBJECTIVE    INR Point of Care   Date Value Ref Range Status   12/04/2017 3.9 (H) 0.86 - 1.14 Final     Comment:     This test is intended for monitoring Coumadin therapy.  Results are not   accurate in patients with prolonged INR due to factor deficiency.         ASSESSMENT / PLAN  INR assessment SUPRA    Recheck INR In: 3 DAYS    INR Location Clinic      Anticoagulation Summary as of 12/4/2017     INR goal 2.0-3.0   Today's INR 3.9!   Maintenance plan No maintenance plan   Full instructions 12/4: 5 mg; 12/5: 7.5 mg; 12/6: 10 mg   Plan last modified Maru Alonso RN (12/4/2017)   Next INR check 12/7/2017   Priority INR   Target end date     Indications   LVAD (left ventricular assist device) present (H) [Z95.811]  Long-term (current) use of anticoagulants [Z79.01] [Z79.01]         Anticoagulation Episode Summary     INR check location     Preferred lab     Send INR reminders to [facility]    Comments HIPPA Forms mailed 6/26/17  Labs drawn either at [facility] or [facility]      Anticoagulation Care Providers     Provider Role Specialty Phone number    Delisa Montgomery MD Responsible Cardiology [phone]            See the Encounter Report to view Anticoagulation Flowsheet and Dosing Calendar (Go to Encounters tab in chart review, and find the Anticoagulation Therapy Visit)    Spoke with Jim.    Maru Alonso, REGINALD

## 2017-12-07 ENCOUNTER — ANTICOAGULATION THERAPY VISIT (OUTPATIENT)
Dept: ANTICOAGULATION | Facility: CLINIC | Age: 62
End: 2017-12-07

## 2017-12-07 DIAGNOSIS — Z95.811 LVAD (LEFT VENTRICULAR ASSIST DEVICE) PRESENT (H): ICD-10-CM

## 2017-12-07 DIAGNOSIS — Z79.01 LONG-TERM (CURRENT) USE OF ANTICOAGULANTS: ICD-10-CM

## 2017-12-07 LAB — INR BLD: 2.7 (ref 0.86–1.14)

## 2017-12-07 PROCEDURE — 36416 COLLJ CAPILLARY BLOOD SPEC: CPT | Performed by: FAMILY MEDICINE

## 2017-12-07 PROCEDURE — 85610 PROTHROMBIN TIME: CPT | Mod: QW | Performed by: FAMILY MEDICINE

## 2017-12-07 NOTE — MR AVS SNAPSHOT
Jim Willingham   12/7/2017   Anticoagulation Therapy Visit    Description:  62 year old male   Provider:  Maru Alonso, RN   Department:  [facility]           INR as of 12/7/2017     Today's INR 2.7      Anticoagulation Summary as of 12/7/2017     INR goal 2.0-3.0   Today's INR 2.7   Full instructions 10 mg on Mon, Wed, Fri; 7.5 mg all other days   Next INR check 12/14/2017    Indications   LVAD (left ventricular assist device) present (H) [Z95.811]  Long-term (current) use of anticoagulants [Z79.01] [Z79.01]         December 2017 Details    Sun Mon Tue Wed Thu Fri Sat          1               2                 3               4               5               6               7      7.5 mg   See details      8      10 mg         9      7.5 mg           10      7.5 mg         11      10 mg         12      7.5 mg         13      10 mg         14            15               16                 17               18               19               20               21               22               23                 24               25               26               27               28               29               30                 31                      Date Details   12/07 This INR check       Date of next INR:  12/14/2017         How to take your warfarin dose     To take:  7.5 mg Take 1.5 of the 5 mg tablets.    To take:  10 mg Take 2 of the 5 mg tablets.

## 2017-12-07 NOTE — PROGRESS NOTES
ANTICOAGULATION FOLLOW-UP CLINIC VISIT    Patient Name:  Jim Willingham  Date:  12/7/2017  Contact Type:  Telephone    SUBJECTIVE:     Patient Findings     Comments Jim has stopped his green drink and is not planning to restart it.           OBJECTIVE    INR Point of Care   Date Value Ref Range Status   12/07/2017 2.7 (H) 0.86 - 1.14 Final     Comment:     This test is intended for monitoring Coumadin therapy.  Results are not   accurate in patients with prolonged INR due to factor deficiency.         ASSESSMENT / PLAN  INR assessment THER    Recheck INR In: 1 WEEK    INR Location Clinic      Anticoagulation Summary as of 12/7/2017     INR goal 2.0-3.0   Today's INR 2.7   Maintenance plan 10 mg (5 mg x 2) on Mon, Wed, Fri; 7.5 mg (5 mg x 1.5) all other days   Full instructions 10 mg on Mon, Wed, Fri; 7.5 mg all other days   Weekly total 60 mg   Plan last modified Maru Alonso RN (12/7/2017)   Next INR check 12/14/2017   Priority INR   Target end date     Indications   LVAD (left ventricular assist device) present (H) [Z95.811]  Long-term (current) use of anticoagulants [Z79.01] [Z79.01]         Anticoagulation Episode Summary     INR check location     Preferred lab     Send INR reminders to [facility]    Comments HIPPA Forms mailed 6/26/17  Labs drawn either at [facility] or [facility]      Anticoagulation Care Providers     Provider Role Specialty Phone number    Delisa Montgomery MD Responsible Cardiology [phone]            See the Encounter Report to view Anticoagulation Flowsheet and Dosing Calendar (Go to Encounters tab in chart review, and find the Anticoagulation Therapy Visit)    Spoke with Tatyana Alonso, REGINALD

## 2017-12-14 ENCOUNTER — ANTICOAGULATION THERAPY VISIT (OUTPATIENT)
Dept: ANTICOAGULATION | Facility: CLINIC | Age: 62
End: 2017-12-14

## 2017-12-14 DIAGNOSIS — Z95.811 LVAD (LEFT VENTRICULAR ASSIST DEVICE) PRESENT (H): ICD-10-CM

## 2017-12-14 DIAGNOSIS — Z79.01 LONG-TERM (CURRENT) USE OF ANTICOAGULANTS: ICD-10-CM

## 2017-12-14 LAB — INR BLD: 2.6 (ref 0.86–1.14)

## 2017-12-14 PROCEDURE — 36416 COLLJ CAPILLARY BLOOD SPEC: CPT | Performed by: INTERNAL MEDICINE

## 2017-12-14 PROCEDURE — 85610 PROTHROMBIN TIME: CPT | Mod: QW | Performed by: INTERNAL MEDICINE

## 2017-12-14 NOTE — PROGRESS NOTES
ANTICOAGULATION FOLLOW-UP CLINIC VISIT    Patient Name:  Jim Willingham  Date:  12/14/2017  Contact Type:  Telephone    SUBJECTIVE:     Patient Findings     Positives No Problem Findings           OBJECTIVE    INR Point of Care   Date Value Ref Range Status   12/14/2017 2.6 (H) 0.86 - 1.14 Final     Comment:     This test is intended for monitoring Coumadin therapy.  Results are not   accurate in patients with prolonged INR due to factor deficiency.         ASSESSMENT / PLAN  INR assessment THER    Recheck INR In: 1 WEEK    INR Location Clinic      Anticoagulation Summary as of 12/14/2017     INR goal 2.0-3.0   Today's INR 2.6   Maintenance plan 10 mg (5 mg x 2) on Mon, Wed, Fri; 7.5 mg (5 mg x 1.5) all other days   Full instructions 10 mg on Mon, Wed, Fri; 7.5 mg all other days   Weekly total 60 mg   No change documented Mrau Alonso RN   Plan last modified Maru Alonso RN (12/7/2017)   Next INR check 12/21/2017   Priority INR   Target end date     Indications   LVAD (left ventricular assist device) present (H) [Z95.811]  Long-term (current) use of anticoagulants [Z79.01] [Z79.01]         Anticoagulation Episode Summary     INR check location     Preferred lab     Send INR reminders to [facility]    Comments HIPPA Forms mailed 6/26/17  Labs drawn either at [facility] or [facility]      Anticoagulation Care Providers     Provider Role Specialty Phone number    Delisa Montgomery MD Responsible Cardiology [phone]            See the Encounter Report to view Anticoagulation Flowsheet and Dosing Calendar (Go to Encounters tab in chart review, and find the Anticoagulation Therapy Visit)    Spoke with Tatyana Alonso RN

## 2017-12-14 NOTE — MR AVS SNAPSHOT
Jim Willingham   12/14/2017   Anticoagulation Therapy Visit    Description:  62 year old male   Provider:  Maru Alonso, RN   Department:  [facility]           INR as of 12/14/2017     Today's INR 2.6      Anticoagulation Summary as of 12/14/2017     INR goal 2.0-3.0   Today's INR 2.6   Full instructions 10 mg on Mon, Wed, Fri; 7.5 mg all other days   Next INR check 12/21/2017    Indications   LVAD (left ventricular assist device) present (H) [Z95.811]  Long-term (current) use of anticoagulants [Z79.01] [Z79.01]         December 2017 Details    Sun Mon Tue Wed Thu Fri Sat          1               2                 3               4               5               6               7               8               9                 10               11               12               13               14      7.5 mg   See details      15      10 mg         16      7.5 mg           17      7.5 mg         18      10 mg         19      7.5 mg         20      10 mg         21            22               23                 24               25               26               27               28               29               30                 31                      Date Details   12/14 This INR check       Date of next INR:  12/21/2017         How to take your warfarin dose     To take:  7.5 mg Take 1.5 of the 5 mg tablets.    To take:  10 mg Take 2 of the 5 mg tablets.

## 2017-12-15 DIAGNOSIS — I50.22 CHRONIC SYSTOLIC CONGESTIVE HEART FAILURE (H): ICD-10-CM

## 2017-12-15 DIAGNOSIS — Z95.811 LVAD (LEFT VENTRICULAR ASSIST DEVICE) PRESENT (H): Primary | ICD-10-CM

## 2017-12-19 ENCOUNTER — ALLIED HEALTH/NURSE VISIT (OUTPATIENT)
Dept: CARDIOLOGY | Facility: CLINIC | Age: 62
End: 2017-12-19
Attending: INTERNAL MEDICINE
Payer: COMMERCIAL

## 2017-12-19 ENCOUNTER — ANTICOAGULATION THERAPY VISIT (OUTPATIENT)
Dept: ANTICOAGULATION | Facility: CLINIC | Age: 62
End: 2017-12-19

## 2017-12-19 VITALS
OXYGEN SATURATION: 95 % | WEIGHT: 264.3 LBS | HEART RATE: 61 BPM | TEMPERATURE: 98 F | SYSTOLIC BLOOD PRESSURE: 86 MMHG | HEIGHT: 68 IN | BODY MASS INDEX: 40.05 KG/M2

## 2017-12-19 DIAGNOSIS — Z95.811 LVAD (LEFT VENTRICULAR ASSIST DEVICE) PRESENT (H): ICD-10-CM

## 2017-12-19 DIAGNOSIS — I50.22 CHRONIC SYSTOLIC CONGESTIVE HEART FAILURE (H): ICD-10-CM

## 2017-12-19 DIAGNOSIS — Z79.01 LONG-TERM (CURRENT) USE OF ANTICOAGULANTS: ICD-10-CM

## 2017-12-19 DIAGNOSIS — I42.8 NICM (NONISCHEMIC CARDIOMYOPATHY) (H): Primary | ICD-10-CM

## 2017-12-19 DIAGNOSIS — Z95.811 LVAD (LEFT VENTRICULAR ASSIST DEVICE) PRESENT (H): Primary | ICD-10-CM

## 2017-12-19 LAB
6 MIN WALK (FT): NORMAL FT
6 MIN WALK (M): NORMAL M
ALBUMIN SERPL-MCNC: 4 G/DL (ref 3.4–5)
ALP SERPL-CCNC: 103 U/L (ref 40–150)
ALT SERPL W P-5'-P-CCNC: 45 U/L (ref 0–70)
ANION GAP SERPL CALCULATED.3IONS-SCNC: 8 MMOL/L (ref 3–14)
AST SERPL W P-5'-P-CCNC: 26 U/L (ref 0–45)
BILIRUB SERPL-MCNC: 0.8 MG/DL (ref 0.2–1.3)
BUN SERPL-MCNC: 56 MG/DL (ref 7–30)
CALCIUM SERPL-MCNC: 8.5 MG/DL (ref 8.5–10.1)
CHLORIDE SERPL-SCNC: 94 MMOL/L (ref 94–109)
CO2 SERPL-SCNC: 30 MMOL/L (ref 20–32)
CREAT SERPL-MCNC: 1.85 MG/DL (ref 0.66–1.25)
CRP SERPL-MCNC: 5.7 MG/L (ref 0–8)
ERYTHROCYTE [DISTWIDTH] IN BLOOD BY AUTOMATED COUNT: 16.1 % (ref 10–15)
GFR SERPL CREATININE-BSD FRML MDRD: 37 ML/MIN/1.7M2
GLUCOSE SERPL-MCNC: 270 MG/DL (ref 70–99)
HCT VFR BLD AUTO: 40.1 % (ref 40–53)
HGB BLD-MCNC: 12.1 G/DL (ref 13.3–17.7)
HGB FREE PLAS-MCNC: <30 MG/DL
INR PPP: 1.9 (ref 0.86–1.14)
LDH SERPL L TO P-CCNC: 332 U/L (ref 85–227)
MCH RBC QN AUTO: 25.4 PG (ref 26.5–33)
MCHC RBC AUTO-ENTMCNC: 30.2 G/DL (ref 31.5–36.5)
MCV RBC AUTO: 84 FL (ref 78–100)
NT-PROBNP SERPL-MCNC: 1119 PG/ML (ref 0–125)
PLATELET # BLD AUTO: 251 10E9/L (ref 150–450)
POTASSIUM SERPL-SCNC: 4.2 MMOL/L (ref 3.4–5.3)
PROT SERPL-MCNC: 7.5 G/DL (ref 6.8–8.8)
RBC # BLD AUTO: 4.76 10E12/L (ref 4.4–5.9)
SODIUM SERPL-SCNC: 132 MMOL/L (ref 133–144)
URATE SERPL-MCNC: 10.8 MG/DL (ref 3.5–7.2)
WBC # BLD AUTO: 9.7 10E9/L (ref 4–11)

## 2017-12-19 PROCEDURE — 99215 OFFICE O/P EST HI 40 MIN: CPT | Mod: 25,ZF

## 2017-12-19 PROCEDURE — 83880 ASSAY OF NATRIURETIC PEPTIDE: CPT | Performed by: INTERNAL MEDICINE

## 2017-12-19 PROCEDURE — 83051 HEMOGLOBIN PLASMA: CPT | Performed by: INTERNAL MEDICINE

## 2017-12-19 PROCEDURE — 99212 OFFICE O/P EST SF 10 MIN: CPT | Mod: ZF

## 2017-12-19 PROCEDURE — 84550 ASSAY OF BLOOD/URIC ACID: CPT | Performed by: INTERNAL MEDICINE

## 2017-12-19 PROCEDURE — 99214 OFFICE O/P EST MOD 30 MIN: CPT | Mod: ZP | Performed by: INTERNAL MEDICINE

## 2017-12-19 PROCEDURE — 85610 PROTHROMBIN TIME: CPT | Performed by: INTERNAL MEDICINE

## 2017-12-19 PROCEDURE — 80053 COMPREHEN METABOLIC PANEL: CPT | Performed by: INTERNAL MEDICINE

## 2017-12-19 PROCEDURE — 86140 C-REACTIVE PROTEIN: CPT | Performed by: INTERNAL MEDICINE

## 2017-12-19 PROCEDURE — 93289 INTERROG DEVICE EVAL HEART: CPT | Mod: 26 | Performed by: INTERNAL MEDICINE

## 2017-12-19 PROCEDURE — 83615 LACTATE (LD) (LDH) ENZYME: CPT | Performed by: INTERNAL MEDICINE

## 2017-12-19 PROCEDURE — 93750 INTERROGATION VAD IN PERSON: CPT | Mod: ZF | Performed by: INTERNAL MEDICINE

## 2017-12-19 PROCEDURE — 85027 COMPLETE CBC AUTOMATED: CPT | Performed by: INTERNAL MEDICINE

## 2017-12-19 PROCEDURE — 36415 COLL VENOUS BLD VENIPUNCTURE: CPT | Performed by: INTERNAL MEDICINE

## 2017-12-19 ASSESSMENT — PAIN SCALES - GENERAL: PAINLEVEL: NO PAIN (0)

## 2017-12-19 NOTE — PROGRESS NOTES
Pt seen in the [facility] for evaluation and iterative programming of a [employer]tronic, Bi-Ventricular ICD, per MD orders. He also has an appointment with Dr. Montgomery. Today his intrinsic rhythm is SR 90 bpm with frequent PVCs. Normal ICD function. 15 NSVT episodes recorded lasting up to 10 seconds. 1,456 V-Sensing episodes recorded, lasting up to 16 seconds. 6 of the 7 V-Sensing EGMs show NSVT. 1 shows 1:1 AV conduction. OptiVol thoracic impedance is near his baseline. AP= 1.3% BVP= 85.2% and VSR pacing= 7.3%. 1 short v-v interval recorded. Lead trends appear stable since the changes at the time of LVAD implant. Pt reports that he is feeling well. Battery estimates 5.1 years to MARVA. Plan for pt to RTC in 3 months.  Multi lead ICD

## 2017-12-19 NOTE — PATIENT INSTRUCTIONS
It was a pleasure to see you in clinic today. Please do not hesitate to call with any questions or concerns. We look forward to seeing you in clinic at your next device check in 3 months.    Cherie Novak RN  Electrophysiology Nurse Clinician  [facility]  During business hours call:  [phone]  After business hours please call: [phone]- select option #4 and ask for job code [id number].

## 2017-12-19 NOTE — MR AVS SNAPSHOT
After Visit Summary   12/19/2017    Jim Willingham    MRN: [id number]           Patient Information     Date Of Birth          1955        Visit Information        Provider Department      12/19/2017 11:00 AM 1, Uc Cv Device [facility]        Today's Diagnoses     NICM (nonischemic cardiomyopathy) (H)/ EF 20%    -  1      Care Instructions    It was a pleasure to see you in clinic today. Please do not hesitate to call with any questions or concerns. We look forward to seeing you in clinic at your next device check in 3 months.    Cherie Novak, RN  Electrophysiology Nurse Clinician  [facility]  During business hours call:  [phone]  After business hours please call: [phone]- select option #4 and ask for job code [phone].            Follow-ups after your visit        Your next 10 appointments already scheduled     Mar 19, 2018 10:00 AM CDT   (Arrive by 9:45 AM)   Implanted Defibulator with Uc Cv Device [address] [place] [address])    [address] [place] [id number]   [phone]            Mar 19, 2018 10:30 AM CDT   Lab with [employer] LAB    Health Lab ([facility])    [address] [place] [id number]   [phone]            Mar 19, 2018 11:00 AM CDT   (Arrive by 10:45 AM)   Ventricular Assist Device with Cate Marshall NP   [facility] ([facility])    [address] [place] [id number]   [phone]            Jul 06, 2018  9:00 AM CDT   Lab with [employer] LAB    Health Lab ([facility])    [address] [place] [address]   [phone]            Jul 06, 2018  9:30 AM CDT   (Arrive by 9:15 AM)   Implanted Defibulator with Uc Cv Device [address] [place] [address] ([facility])    [address] [place] [address]  "Floor  [facility] [id number] [phone]            Jul 06, 2018 10:00 AM CDT   (Arrive by 9:45 AM)   Ventricular Assist Device with Delisa Montgomery MD   [facility] ([facility])    [address] [facility]  3rd [facility] [place] [id number] [phone]              Future tests that were ordered for you today     Open Future Orders        Priority Expected Expires Ordered    Basic metabolic panel Routine 12/22/2017 12/19/2018 12/19/2017    (93860) ICD DEVICE PROGRAMMING EVAL, MULTI LEAD ICD Routine 12/19/2017 3/19/2018 12/19/2017    (37561) ICD DEVICE PROGRAMMING EVAL, MULTI LEAD ICD Routine 3/19/2018 6/17/2018 12/19/2017            Who to contact     If you have questions or need follow up information about today's clinic visit or your schedule please contact [facility] directly at [phone].  Normal or non-critical lab and imaging results will be communicated to you by GraphSciencehart, letter or phone within 4 business days after the clinic has received the results. If you do not hear from us within 7 days, please contact the clinic through Linkytt or phone. If you have a critical or abnormal lab result, we will notify you by phone as soon as possible.  Submit refill requests through Lettuce or call your pharmacy and they will forward the refill request to us. Please allow 3 business days for your refill to be completed.          Additional Information About Your Visit        Lettuce Information     Lettuce lets you send messages to your doctor, view your test results, renew your prescriptions, schedule appointments and more. To sign up, go to [email or web address] . Click on \"Log in\" on the left side of the screen, which will take you to the Welcome page. Then click on \"Sign up Now\" on the right side of the page.     You will be asked to enter the access code listed below, as well as some personal information. Please follow the directions to create your " username and password.     Your access code is: [email or web address]  Expires: 3/5/2018  6:30 AM     Your access code will  in 90 days. If you need help or a new code, please call your [facility] or [phone].        Care EveryWhere ID     This is your Care EveryWhere ID. This could be used by other organizations to access your [facility] medical records  [id number]         Blood Pressure from Last 3 Encounters:   17 (!) 86/0   09/15/17 (!) 78/0   17 (!) 74/0    Weight from Last 3 Encounters:   17 119.9 kg (264 lb 4.8 oz)   09/15/17 113.1 kg (249 lb 4.8 oz)   17 111.9 kg (246 lb 12.8 oz)                 Today's Medication Changes          These changes are accurate as of: 17 12:33 PM.  If you have any questions, ask your nurse or doctor.               Stop taking these medicines if you haven't already. Please contact your care team if you have questions.     UNABLE TO FIND   Stopped by:  Delisa Montgomery MD                    Primary Care Provider Office Phone # Fax #    Jovany Salas [phone]       [place] [address]        Equal Access to Services     [facility] AH: Hadii andrea chopra hadasho Somadihaali, waaxda luqadaha, qaybta kaalmada adeedward, kendra marquez . So [facility] [phone].    ATENCIÓN: Si habla español, tiene a roger disposición servicios gratuitos de asistencia lingüística. Llame al [phone].    We comply with applicable federal civil rights laws and [place] laws. We do not discriminate on the basis of race, color, national origin, age, disability, sex, sexual orientation, or gender identity.            Thank you!     Thank you for choosing [facility]  for your care. Our goal is always to provide you with excellent care. Hearing back from our patients is one way we can continue to improve our services. Please take a few minutes to complete the written survey that  you may receive in the mail after your visit with us. Thank you!             Your Updated Medication List - Protect others around you: Learn how to safely use, store and throw away your medicines at www.disposemymeds.org.          This list is accurate as of: 12/19/17 12:33 PM.  Always use your most recent med list.                   Brand Name Dispense Instructions for use Diagnosis    acetaminophen 325 MG tablet    TYLENOL    100 tablet    Take 2 tablets (650 mg) by mouth every 6 hours    Acute post-operative pain       ALDACTONE 25 MG tablet   Generic drug:  spironolactone     30 tablet    Take 1 tablet (25 mg) by mouth daily    LVAD (left ventricular assist device) present (H), Chronic systolic congestive heart failure (H)       allopurinol 100 MG tablet    ZYLOPRIM    30 tablet    Take 1 tablet (100 mg) by mouth daily    Acute idiopathic gout, unspecified site       aspirin 81 MG chewable tablet     36 tablet    1 tablet (81 mg) by Oral or Feeding Tube route daily    LVAD (left ventricular assist device) present (H)       BREO ELLIPTA 200-25 MCG/INH oral inhaler   Generic drug:  fluticasone-vilanterol      Inhale 1 puff into the lungs daily        bumetanide 1 MG tablet    BUMEX    30 tablet    Take 2 mg by mouth daily Alternate 1mg daily with 2mg daily, every other day.    LVAD (left ventricular assist device) present (H)       celeXA 20 MG tablet   Generic drug:  citalopram      Take 20 mg by mouth daily        COLCHICINE PO      Take by mouth daily        cyanocobalamin 1000 MCG/ML injection    VITAMIN B12     Inject 1,000 mcg into the muscle every 30 days        HYDROmorphone 2 MG tablet    DILAUDID    45 tablet    Take 1 tablet (2 mg) by mouth every 4 hours as needed for moderate to severe pain    Acute post-operative pain       INCRUSE ELLIPTA 62.5 MCG/INH oral inhaler   Generic drug:  umeclidinium      Inhale 1 puff into the lungs daily        ipratropium - albuterol 0.5 mg/2.5 mg/3 mL 0.5-2.5 (3) MG/3ML  neb solution    DUONEB     Take 3 mLs by nebulization every 4 hours as needed for shortness of breath / dyspnea or wheezing        losartan 25 MG tablet    COZAAR    60 tablet    Take 1 tablet (25 mg) by mouth daily    LVAD (left ventricular assist device) present (H), Chronic systolic congestive heart failure (H)       LOVENOX 120 MG/0.8ML injection   Generic drug:  enoxaparin     10 Syringe    Inject 110mgs subq every 12 hours as directed by the Anticoagulation Clinic        metFORMIN 500 MG tablet    GLUCOPHAGE    60 tablet    Take 0.5 tablets (250 mg) by mouth 2 times daily (with meals)    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, without long-term current use of insulin, unspecified CKD stage (H)       metoprolol 25 MG 24 hr tablet    TOPROL-XL    180 tablet    Take 1 tablet (25 mg) by mouth 2 times daily    Chronic systolic congestive heart failure (H), LVAD (left ventricular assist device) present (H)       pantoprazole 40 MG EC tablet    PROTONIX    30 tablet    Take 1 tablet (40 mg) by mouth every morning    Acute post-operative pain       polyethylene glycol Packet    MIRALAX/GLYCOLAX    7 packet    Take 17 g by mouth daily as needed for constipation (for no bm in 1 day)    Acute post-operative pain       predniSONE 20 MG tablet    DELTASONE    20 tablet    Take 1 tablet (20 mg) by mouth daily    Acute idiopathic gout, unspecified site       PROAIR  (90 BASE) MCG/ACT Inhaler   Generic drug:  albuterol      Inhale 2 puffs into the lungs every 4 hours as needed for shortness of breath / dyspnea or wheezing        senna-docusate 8.6-50 MG per tablet    SENOKOT-S;PERICOLACE    100 tablet    Take 2 tablets by mouth 2 times daily as needed for constipation    Acute post-operative pain       SINGULAIR 10 MG tablet   Generic drug:  montelukast      Take 10 mg by mouth At Bedtime        traMADol 50 MG tablet    ULTRAM    28 tablet    Take 2 tablets (100 mg) by mouth every 6  hours as needed for moderate pain or breakthrough pain    Acute post-operative pain       traZODone 50 MG tablet    DESYREL    11 tablet    Take 50 mg by mouth nightly x one week then reduce to 25 mg (1/2 tab) by mouth nightly x one week then stop.    Acute post-operative pain, LVAD (left ventricular assist device) present (H)       * warfarin 3 MG tablet    COUMADIN    30 tablet    Use as directed.    LVAD (left ventricular assist device) present (H)       * warfarin 5 MG tablet    COUMADIN    60 tablet    Take 2 tablets (10 mg) by mouth daily    LVAD (left ventricular assist device) present (H), Chronic systolic congestive heart failure (H)       zinc sulfate 220 (50 ZN) MG capsule    ZINCATE     Take 220 mg by mouth 2 times daily        * Notice:  This list has 2 medication(s) that are the same as other medications prescribed for you. Read the directions carefully, and ask your doctor or other care provider to review them with you.

## 2017-12-19 NOTE — PATIENT INSTRUCTIONS
Medications:  1. Increase bumex to 2mg daily for 3 days (Wed, Thur, Fri) only    Follow-up:  1. Please see a Nurse Practitioner in 3 months and Dr. Montgoemry in 6 months  2. We will place a pulmonary consult and a nutrition consult    Instructions:  1. Please get labs drawn on Friday, 12/22.       Page the VAD Coordinator on call if you gain more than 3 lb in a day or 5 in a week. Please also page if you feel unwell or have alarms.     Great to see you in clinic today. To Page the VAD Coordinator on call, dial [phone] option #4 and ask to speak to the VAD coordinator on call.

## 2017-12-19 NOTE — LETTER
12/19/2017      RE: Jim Willingham  [address] [facility] [address]       Dear Colleague,    Thank you for the opportunity to participate in the care of your patient, Jim Willingham, at the [facility]. Please see a copy of my visit note below.    December 19, 2017    Dear Buddy,     I had the pleasure of seeing Jim Willingham in the [facility] today.     As you know he is a pleasant 62 year old male with a long standing history of non- ischemic cardiomyopathy s/p LVAD placement on 6/19/2017, followed by rehabilitation without any major complications or adverse events.    The biggest issue he has been increasing recently has been recurrent productive cough which has worsened his asthma symptoms. He has been on several prednisone pulses as well as several courses of antibiotics. He is presently finishing a course of antibiotics now. He does feel that this is improved his symptoms however continues to fill short of breath from this. He has not seen pulmonary in nearly a year at [place].     He is also gained several pounds with recurrent prednisone bursts. He also feels his recurrent cough and shortness of breath have made it difficult for him to exercise.   Prior to this his breathing felt excellent. He recovered very well from his LVAD implantation overall. He has had a few bad alarms when his power module has come disconnected from the wall due to faulty outlet. He is scheduled to have this fixed.      He presently denies palpitations, syncope, chest pain, abdominal pain, stroke symptoms, syncope, loss of consciousness, orthopnea, PND. He denies any driveline erythema.    PAST MEDICAL HISTORY:  Past Medical History:   Diagnosis Date     Benign essential hypertension 5/11/2017     Cardiomyopathy, unspecified 5/8/2017     CKD (chronic kidney disease) stage 3, GFR 30-59 ml/min 5/11/2017     Depression 5/11/2017      Diabetes mellitus (H) 5/11/2017     H/O gastric bypass 5/11/2017     ICD (implantable cardioverter-defibrillator), biventricular, in situ 5/11/2017     NICM (nonischemic cardiomyopathy) (H)/ EF 20% 5/11/2017    ECHO: LVEDd. 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 They have several children (from separate partners) and they have just adopted a granddaughter who he is the primary care giver.     He stopped ETOH in  2010 and would drink socially before that but never had any life difficulties related to alcohol abuse.     He stopped smoking in 1994. No ilicits.     Family history: there is no family history of premature MI or SCD, or CHF    CURRENT MEDICATIONS:  Current Outpatient Prescriptions   Medication Sig Dispense Refill     warfarin (COUMADIN) 5 MG tablet Take 2 tablets (10 mg) by mouth daily 60 tablet 5     pantoprazole (PROTONIX) 40 MG EC tablet Take 1 tablet (40 mg) by mouth every morning 30 tablet 5     bumetanide (BUMEX) 1 MG tablet Take 2 mg by mouth daily Alternate 1mg daily with 2mg daily, every other day.  30 tablet      spironolactone (ALDACTONE) 25 MG tablet Take 1 tablet (25 mg) by mouth daily 30 tablet 5     losartan (COZAAR) 25 MG tablet Take 1 tablet (25 mg) by mouth daily 60 tablet 3     predniSONE (DELTASONE) 20 MG tablet Take 1 tablet (20 mg) by mouth daily 20 tablet 0     metoprolol (TOPROL-XL) 25 MG 24 hr tablet Take 1 tablet (25 mg) by mouth 2 times daily 180 tablet 3     aspirin 81 MG chewable tablet 1 tablet (81 mg) by Oral or Feeding Tube route daily 36 tablet      senna-docusate (SENOKOT-S;PERICOLACE) 8.6-50 MG per tablet Take 2 tablets by mouth 2  times daily as needed for constipation 100 tablet 0     warfarin (COUMADIN) 3 MG tablet Use as directed. 30 tablet 0     traMADol (ULTRAM) 50 MG tablet Take 2 tablets (100 mg) by mouth every 6 hours as needed for moderate pain or breakthrough pain 28 tablet      acetaminophen (TYLENOL) 325 MG tablet Take 2 tablets (650 mg) by mouth every 6 hours 100 tablet      polyethylene glycol (MIRALAX/GLYCOLAX) Packet Take 17 g by mouth daily as needed for constipation (for no bm in 1 day) 7 packet      albuterol (ALBUTEROL) 108 (90 BASE) MCG/ACT Inhaler Inhale 2 puffs into the lungs every 4 hours as needed for shortness of breath / dyspnea or wheezing       ipratropium - albuterol 0.5 mg/2.5 mg/3 mL (DUONEB) 0.5-2.5 (3) MG/3ML neb solution Take 3 mLs by nebulization every 4 hours as needed for shortness of breath / dyspnea or wheezing       citalopram (CELEXA) 20 MG tablet Take 20 mg by mouth daily       cyanocobalamin (VITAMIN B12) 1000 MCG/ML injection Inject 1,000 mcg into the muscle every 30 days       fluticasone-vilanterol (BREO ELLIPTA) 200-25 MCG/INH oral inhaler Inhale 1 puff into the lungs daily       montelukast (SINGULAIR) 10 MG tablet Take 10 mg by mouth At Bedtime       umeclidinium (INCRUSE ELLIPTA) 62.5 MCG/INH oral inhaler Inhale 1 puff into the lungs daily       zinc sulfate (ZINCATE) 220 (50 ZN) MG capsule Take 220 mg by mouth 2 times daily       enoxaparin (ENOXAPARIN) 120 MG/0.8ML injection Inject 110mgs subq every 12 hours as directed by the Anticoagulation Clinic 10 Syringe 1     COLCHICINE PO Take by mouth daily       allopurinol (ZYLOPRIM) 100 MG tablet Take 1 tablet (100 mg) by mouth daily (Patient not taking: Reported on 12/19/2017) 30 tablet 4     metFORMIN (GLUCOPHAGE) 500 MG tablet Take 0.5 tablets (250 mg) by mouth 2 times daily (with meals) (Patient not taking: Reported on 12/19/2017) 60 tablet 0     traZODone (DESYREL) 50 MG tablet Take 50 mg by mouth nightly x one week then reduce to 25 mg  "(1/2 tab) by mouth nightly x one week then stop. (Patient not taking: Reported on 8/11/2017) 11 tablet 0     HYDROmorphone (DILAUDID) 2 MG tablet Take 1 tablet (2 mg) by mouth every 4 hours as needed for moderate to severe pain (Patient not taking: Reported on 7/25/2017) 45 tablet 0       ROS:   Constitutional: No fever, chills, or sweats. Weight is stable at present  ENT: No visual disturbance, ear ache, epistaxis, sore throat.   Allergies/Immunologic: Negative.   Respiratory: No cough, hemoptysis.   Cardiovascular: As per HPI.   GI: No nausea, vomiting, hematemesis, melena, or hematochezia.   : No urinary frequency, dysuria, or hematuria.   Integument: Negative.   Psychiatric: worried about the state of his health    Neuro: Negative.   Endocrinology: Negative.   Musculoskeletal: Negative.    EXAM:  BP (!) 86/0 (BP Location: Left arm, Cuff Size: Adult Large)  Pulse 61  Temp 98  F (36.7  C) (Oral)  Ht 1.727 m (5' 8\")  Wt 119.9 kg (264 lb 4.8 oz)  SpO2 95%  BMI 40.19 kg/m2  General: appears comfortable, alert and articulate, obese   Head: normocephalic, atraumatic  Eyes: anicteric sclera, EOMI  Neck: no adenopathy  Orophyarynx: moist mucosa, no lesions, dentition intact  Lungs: + wheezing, no crackles, + upper airway rhonchi   Heart: LVAD hum heart throughout precordium, JVP appears to mid neck,  trace to 1+ LE edema  Abdomen: soft, non-tender, bowel sounds present, no hepatosplenomegaly   Neurological: normal speech and affect, no gross motor deficits    Last coronary angiogram and RHC May 10, 2017         Pressures      Phase:Baseline      AO :  70.00 mmHg / 49.00 mmHg ( 57.00 mmHg )  @ 10:39:59 AM            72.00 mmHg / 49.00 mmHg ( 58.00 mmHg )  @ 10:40:10 AM      LV :  83.00 mmHg / 16.00 mmHg / 23.00 mmHg  @ 10:46:50 AM      LV Pull back :  85.00 mmHg / 15.00 mmHg / 23.00 mmHg  @ 10:46:59 AM      AO Pull back :  86.00 mmHg / 51.00 mmHg ( 74.00 mmHg )  @ 10:47:06 AM      RA :  a wave = v wave = mean = " 15.00 mmHg  @ 11:06:43 AM      RV :  44.00 mmHg / 15.00 mmHg / 18.00 mmHg  @ 11:06:27 AM      PA :  41.00 mmHg / 20.00 mmHg ( 32.00 mmHg )  @ 10:59:05 AM      PCW :  a wave = v wave = mean = 32.00 mmHg  @ 11:00:00 AM    Valves      Phase:Baseline      AV :  0.00 mmHg  @ 10:22:09 AM      AV Mean Gradient:  13.70 mmHg  @ 10:22:09 AM      AV Valve Area:  1.27 cm2  @ 10:22:09 AM      Cardiac Output      Phase:Baseline      Thermo Cardiac Output:  4.77 l/min  @ 10:22:09 AM    Flow      Phase:Baseline      Qp :  4.77 l/min  @ 10:22:09 AM      Qs :  4.77 l/min  @ 10:22:09 AM       Status       Last echocardiogram:    Severely increased left ventricular size. LVEDd is 7.66 cm   2. LV function is severely reduced. The visually estimated ejection fraction is 20%.   3. Septal motion consistent with conduction delay.   4. Restrictive pattern of diastolic filling consistent with severe diastolic dysfunction.   5. Severely dilated left atrium.   6. Severe mitral valve regurgitation.   7. Moderate pulmonary hypertension. PAP 48 mmHG plus RAP, or approximately 55-58 mmHG.   8. Compared to prior study (1/26/2016); The EF remains severely depressed at 20%. Mitral regurgitation has increased from moderate to severe, likely due to the patient's cardiomyopathy. PAP has increased from 22 mmHG plus RAP to 48 mmHG feliz RAP.   9. The rhythm is normal sinus.  ualized. Trace pulmonic valve regurgitation.    LVIDd (2D):     7.66 cm (3.4-5.7) LA Major diam,s, A2c 7.5 cm      Pulmonary function tests:   The Patient reportedly appeared to give maximal effort.  The PFT   testing was performed on calibrated equipment and recorded in   compliance with the ATS/ERS Task Force Standardization of Lung   Function Testing. The PFT testing was performed in the sitting   position.    After acceptable spirograms had been obtained, the following   values were obtained and/or calculated:    Pre bronchodilator Spirometry:   SVC 2.35 L (61 %)  DLCO unc 16.89  ml/min/mmHg (59 %)  DLCOcor 17.57 ml/min/mmHg (62 %)  HGB 13.3 gm/dL    INTERPRETATION:    Slow vital capacity (SVC) is moderately reduced.    The DLCO is corrected for hemoglobin and is moderately reduced.    An isolated reduction in DLCO with normal lung volumes is seen in   patients with anemia, chronic pulmonary embolism, primary   pulmonary hypertension, carboxyhemoglobinemia, vasculitis or   early interstitial lung disease.    ECG: (per north) V pacing rate 90     Labs:    Lab Results   Component Value Date    WBC 9.7 12/19/2017    HGB 12.1 (L) 12/19/2017    HCT 40.1 12/19/2017     12/19/2017     (L) 12/19/2017    POTASSIUM 4.2 12/19/2017    CHLORIDE 94 12/19/2017    CO2 30 12/19/2017    BUN 56 (H) 12/19/2017    CR 1.85 (H) 12/19/2017     (H) 12/19/2017    SED 25 (H) 05/26/2017    NTBNPI 4216 (H) 06/15/2017    NTBNP 1119 (H) 12/19/2017    AST 26 12/19/2017    ALT 45 12/19/2017    ALKPHOS 103 12/19/2017    BILITOTAL 0.8 12/19/2017    INR 1.90 (H) 12/19/2017       Assessment and Plan:   In summary this is a very pleasant 62 M with a history of NICM who is now s/p LVAD on 6/19/2017.  He is slightly hypervolemic on exam today and therefore I am increasing his diuretics as listed below.  I am also concerned about his recurrent bronchitis and abnormal PFTs.       NICM s/p HM II LVAD   Stage D  NYHA Class II  ACEi/ARB yes - Start losartan 25 mg daily  BB yes --Toprol XL 25 mg  Aldosterone antagonist - Aldactone 12.5 mg daily  SCD prophylaxis - ICD present  Fluid status mildly hypervolemic- see below  NSAID use: contraindicated  LDH- stable  MAP: At goal of 60-80    Parox afib: On warfarin    Gout:  Stable     Asthma/COPD: I am recommending that he see pulmonary here. I would like to know their thoughts on his candidacy for cardiac transplant not only in the perioperative period but what his prognosis is after cardiac transplantation. In addition, I am not sure why his requires so many steroid  tapers and antibiotics. This gives me concern about overall health of his lungs as we consider his candidacy.  Once he finishes his course of antibiotics it may be worthwhile repeating his PFTs and imaging.      CKD: His creatinine is slightly worse today and we will repeat this next week after additional diuresis.     Cardiac transplant candidacy:  We discussed  that his ideal goal BMI would be below 35 to be considered for cardiac transplantation.      Instructions given to the patient:  Medications:  1. Increase bumex to 2mg daily for 3 days (Wed, Thur, Fri) only          Delisa Montgomery MD   of Medicine   [facility] Division of Cardiology       CC  Chase Molina MD   [facility]

## 2017-12-19 NOTE — MR AVS SNAPSHOT
After Visit Summary   12/19/2017    Jim Willingham    MRN: [id number]           Patient Information     Date Of Birth          1955        Visit Information        Provider Department      12/19/2017 11:30 AM Delisa Montgomery MD [facility]        Today's Diagnoses     LVAD (left ventricular assist device) present (H)    -  1    Chronic systolic congestive heart failure (H)          Care Instructions    Medications:  1. Increase bumex to 2mg daily for 3 days (Wed, Thur, Fri) only    Follow-up:  1. Please see a Nurse Practitioner in 3 months and Dr. Montgomery in 6 months  2. We will place a pulmonary consult and a nutrition consult    Instructions:  1. Please get labs drawn on Friday, 12/22.       Page the VAD Coordinator on call if you gain more than 3 lb in a day or 5 in a week. Please also page if you feel unwell or have alarms.     Great to see you in clinic today. To Page the VAD Coordinator on call, dial [phone] option #[phone] and ask to speak to the VAD coordinator on call.               Follow-ups after your visit        Additional Services     NUTRITION REFERRAL       Your provider has referred you to: [facility]: [facility] (on call location)  - [place] [phone]   [email or web address]    Please be aware that coverage of these services is subject to the terms and limitations of your health insurance plan.  Call member services at your health plan with any benefit or coverage questions.      Please bring the following with you to your appointment:    (1) This referral request  (2) Any documents given to you regarding this referral  (3) Any specific questions you have about diet and/or food choices    Please assist in weight loss for transplant listing            Pulmonology IP Consult       Pt with LVAD and history of asthma, chronic bronchitis. Please eval lung [facility] for heart transplant listing.                  Follow-up notes  from your care team     Return in about 3 months (around 3/19/2018) for Routine Visit.      Your next 10 appointments already scheduled     Mar 19, 2018 10:00 AM CDT   (Arrive by 9:45 AM)   Implanted Defibulator with Uc Cv Device 1   [facility] ([facility])    [address] [place] [id number]   [phone]            Mar 19, 2018 10:30 AM CDT   Lab with [facility] LAB   [facility] ([facility])    [address] [place] [address]   [phone]            Mar 19, 2018 11:00 AM CDT   (Arrive by 10:45 AM)   Ventricular Assist Device with Cate Marshall NP   [facility] ([facility])    [address] [place] [address]   [phone]            Jul 06, 2018  9:00 AM CDT   Lab with UC LAB   [facility] ([facility])    [address] [place] [address]   [phone]            Jul 06, 2018  9:30 AM CDT   (Arrive by 9:15 AM)   Implanted Defibulator with Uc Cv Device 1   [facility] ([facility])    [address] [place] [address]   [phone]            Jul 06, 2018 10:00 AM CDT   (Arrive by 9:45 AM)   Ventricular Assist Device with Delisa Montgomery MD   [facility] ([facility])    [address] [place] [phone]   [phone]              Future tests that were ordered for you today     Open Future Orders        Priority Expected Expires Ordered    Basic metabolic panel Routine 12/22/2017 12/19/2018 12/19/2017    (11526) ICD DEVICE PROGRAMMING EVAL, MULTI LEAD ICD Routine 12/19/2017 3/19/2018 12/19/2017    (35379) ICD DEVICE PROGRAMMING EVAL, MULTI LEAD ICD Routine 3/19/2018 6/17/2018 12/19/2017            Who to contact     If you have questions or need follow up information  "about today's clinic visit or your schedule please contact [facility] directly at [phone].  Normal or non-critical lab and imaging results will be communicated to you by MyChart, letter or phone within 4 business days after the clinic has received the results. If you do not hear from us within 7 days, please contact the clinic through [employer] or phone. If you have a critical or abnormal lab result, we will notify you by phone as soon as possible.  Submit refill requests through [employer] or call your pharmacy and they will forward the refill request to us. Please allow 3 business days for your refill to be completed.          Additional Information About Your Visit        [employer] Information     [employer] lets you send messages to your doctor, view your test results, renew your prescriptions, schedule appointments and more. To sign up, go to [email or web address] . Click on \"Log in\" on the left side of the screen, which will take you to the Welcome page. Then click on \"Sign up Now\" on the right side of the page.     You will be asked to enter the access code listed below, as well as some personal information. Please follow the directions to create your username and password.     Your access code is: [email or web address]  Expires: 3/5/2018  6:30 AM     Your access code will  in 90 days. If you need help or a new code, please call your [place] clinic or [phone].        Care EveryWhere ID     This is your Care EveryWhere ID. This could be used by other organizations to access your [place] medical records  [id number]        Your Vitals Were     Pulse Temperature Height Pulse Oximetry BMI (Body Mass Index)       61 98  F (36.7  C) (Oral) 1.727 m (5' 8\") 95% 40.19 kg/m2        Blood Pressure from Last 3 Encounters:   17 (!) 86/0   09/15/17 (!) 78/0   17 (!) 74/0    Weight from Last 3 Encounters:   17 119.9 kg (264 lb 4.8 oz)   09/15/17 113.1 kg (249 lb 4.8 oz)   17 111.9 kg (246 " lb 12.8 oz)              We Performed the Following     (69220) INTERROGATE VENT ASSIST DEVICE, IN PERSON, DAGO JETER ANALYSIS PARAMETERS     NUTRITION REFERRAL     Pulmonology IP Consult          Today's Medication Changes          These changes are accurate as of: 12/19/17 12:33 PM.  If you have any questions, ask your nurse or doctor.               Stop taking these medicines if you haven't already. Please contact your care team if you have questions.     UNABLE TO FIND   Stopped by:  Delisa Montgomery MD                    Primary Care Provider Office Phone # Fax #    Jovany Salas [phone]       [address] [address]        Equal Access to Services     [facility]: Hadii aad ku hadasho Soomaali, waaxda luqadaha, qaybta kaalmada adeegyada, kendra marquez . So [facility] [phone].    ATENCIÓN: Si habla español, tiene a roger disposición servicios gratuitos de asistencia lingüística. Llame al [phone].    We comply with applicable federal civil rights laws and [place] laws. We do not discriminate on the basis of race, color, national origin, age, disability, sex, sexual orientation, or gender identity.            Thank you!     Thank you for choosing [facility]  for your care. Our goal is always to provide you with excellent care. Hearing back from our patients is one way we can continue to improve our services. Please take a few minutes to complete the written survey that you may receive in the mail after your visit with us. Thank you!             Your Updated Medication List - Protect others around you: Learn how to safely use, store and throw away your medicines at www.disposemymeds.org.          This list is accurate as of: 12/19/17 12:33 PM.  Always use your most recent med list.                   Brand Name Dispense Instructions for use Diagnosis    acetaminophen 325 MG tablet    TYLENOL    100 tablet    Take  2 tablets (650 mg) by mouth every 6 hours    Acute post-operative pain       ALDACTONE 25 MG tablet   Generic drug:  spironolactone     30 tablet    Take 1 tablet (25 mg) by mouth daily    LVAD (left ventricular assist device) present (H), Chronic systolic congestive heart failure (H)       allopurinol 100 MG tablet    ZYLOPRIM    30 tablet    Take 1 tablet (100 mg) by mouth daily    Acute idiopathic gout, unspecified site       aspirin 81 MG chewable tablet     36 tablet    1 tablet (81 mg) by Oral or Feeding Tube route daily    LVAD (left ventricular assist device) present (H)       BREO ELLIPTA 200-25 MCG/INH oral inhaler   Generic drug:  fluticasone-vilanterol      Inhale 1 puff into the lungs daily        bumetanide 1 MG tablet    BUMEX    30 tablet    Take 2 mg by mouth daily Alternate 1mg daily with 2mg daily, every other day.    LVAD (left ventricular assist device) present (H)       celeXA 20 MG tablet   Generic drug:  citalopram      Take 20 mg by mouth daily        COLCHICINE PO      Take by mouth daily        cyanocobalamin 1000 MCG/ML injection    VITAMIN B12     Inject 1,000 mcg into the muscle every 30 days        HYDROmorphone 2 MG tablet    DILAUDID    45 tablet    Take 1 tablet (2 mg) by mouth every 4 hours as needed for moderate to severe pain    Acute post-operative pain       INCRUSE ELLIPTA 62.5 MCG/INH oral inhaler   Generic drug:  umeclidinium      Inhale 1 puff into the lungs daily        ipratropium - albuterol 0.5 mg/2.5 mg/3 mL 0.5-2.5 (3) MG/3ML neb solution    DUONEB     Take 3 mLs by nebulization every 4 hours as needed for shortness of breath / dyspnea or wheezing        losartan 25 MG tablet    COZAAR    60 tablet    Take 1 tablet (25 mg) by mouth daily    LVAD (left ventricular assist device) present (H), Chronic systolic congestive heart failure (H)       LOVENOX 120 MG/0.8ML injection   Generic drug:  enoxaparin     10 Syringe    Inject 110mgs subq every 12 hours as directed by  the Anticoagulation Clinic        metFORMIN 500 MG tablet    GLUCOPHAGE    60 tablet    Take 0.5 tablets (250 mg) by mouth 2 times daily (with meals)    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, without long-term current use of insulin, unspecified CKD stage (H)       metoprolol 25 MG 24 hr tablet    TOPROL-XL    180 tablet    Take 1 tablet (25 mg) by mouth 2 times daily    Chronic systolic congestive heart failure (H), LVAD (left ventricular assist device) present (H)       pantoprazole 40 MG EC tablet    PROTONIX    30 tablet    Take 1 tablet (40 mg) by mouth every morning    Acute post-operative pain       polyethylene glycol Packet    MIRALAX/GLYCOLAX    7 packet    Take 17 g by mouth daily as needed for constipation (for no bm in 1 day)    Acute post-operative pain       predniSONE 20 MG tablet    DELTASONE    20 tablet    Take 1 tablet (20 mg) by mouth daily    Acute idiopathic gout, unspecified site       PROAIR  (90 BASE) MCG/ACT Inhaler   Generic drug:  albuterol      Inhale 2 puffs into the lungs every 4 hours as needed for shortness of breath / dyspnea or wheezing        senna-docusate 8.6-50 MG per tablet    SENOKOT-S;PERICOLACE    100 tablet    Take 2 tablets by mouth 2 times daily as needed for constipation    Acute post-operative pain       SINGULAIR 10 MG tablet   Generic drug:  montelukast      Take 10 mg by mouth At Bedtime        traMADol 50 MG tablet    ULTRAM    28 tablet    Take 2 tablets (100 mg) by mouth every 6 hours as needed for moderate pain or breakthrough pain    Acute post-operative pain       traZODone 50 MG tablet    DESYREL    11 tablet    Take 50 mg by mouth nightly x one week then reduce to 25 mg (1/2 tab) by mouth nightly x one week then stop.    Acute post-operative pain, LVAD (left ventricular assist device) present (H)       * warfarin 3 MG tablet    COUMADIN    30 tablet    Use as directed.    LVAD (left ventricular assist device)  present (H)       * warfarin 5 MG tablet    COUMADIN    60 tablet    Take 2 tablets (10 mg) by mouth daily    LVAD (left ventricular assist device) present (H), Chronic systolic congestive heart failure (H)       zinc sulfate 220 (50 ZN) MG capsule    ZINCATE     Take 220 mg by mouth 2 times daily        * Notice:  This list has 2 medication(s) that are the same as other medications prescribed for you. Read the directions carefully, and ask your doctor or other care provider to review them with you.

## 2017-12-19 NOTE — PROGRESS NOTES
ANTICOAGULATION FOLLOW-UP CLINIC VISIT    Patient Name:  Jim Willingham  Date:  12/19/2017  Contact Type:  Telephone    SUBJECTIVE:        OBJECTIVE    INR   Date Value Ref Range Status   12/19/2017 1.90 (H) 0.86 - 1.14 Final       ASSESSMENT / PLAN  INR assessment THER    Recheck INR In: 1 WEEK    INR Location Clinic      Anticoagulation Summary as of 12/19/2017     INR goal 2.0-3.0   Today's INR 1.90!   Maintenance plan 10 mg (5 mg x 2) on Mon, Wed, Fri; 7.5 mg (5 mg x 1.5) all other days   Full instructions 12/19: 10 mg; Otherwise 10 mg on Mon, Wed, Fri; 7.5 mg all other days   Weekly total 60 mg   Plan last modified Maru Alonso RN (12/7/2017)   Next INR check 12/26/2017   Priority INR   Target end date     Indications   LVAD (left ventricular assist device) present (H) [Z95.811]  Long-term (current) use of anticoagulants [Z79.01] [Z79.01]         Anticoagulation Episode Summary     INR check location     Preferred lab     Send INR reminders to [facility]    Comments HIPPA Forms mailed 6/26/17  Labs drawn either at [facility] or [facility]      Anticoagulation Care Providers     Provider Role Specialty Phone number    Delisa Montgomery MD Responsible Cardiology [phone]            See the Encounter Report to view Anticoagulation Flowsheet and Dosing Calendar (Go to Encounters tab in chart review, and find the Anticoagulation Therapy Visit)    Left message for patient with results and dosing recommendations. Asked patient to call back to report any missed doses, falls, signs and symptoms of bleeding or clotting, any changes in health, medication, or diet. Asked patient to call back with any questions or concerns.    Brendan Montoya, RN

## 2017-12-19 NOTE — MR AVS SNAPSHOT
Jim Willingham   12/19/2017   Anticoagulation Therapy Visit    Description:  62 year old male   Provider:  Brendan Montoya, RN   Department:  [facility]           INR as of 12/19/2017     Today's INR 1.90!      Anticoagulation Summary as of 12/19/2017     INR goal 2.0-3.0   Today's INR 1.90!   Full instructions 12/19: 10 mg; Otherwise 10 mg on Mon, Wed, Fri; 7.5 mg all other days   Next INR check 12/26/2017    Indications   LVAD (left ventricular assist device) present (H) [Z95.811]  Long-term (current) use of anticoagulants [Z79.01] [Z79.01]         December 2017 Details    Sun Mon Tue Wed Thu Fri Sat          1               2                 3               4               5               6               7               8               9                 10               11               12               13               14               15               16                 17               18               19      10 mg   See details      20      10 mg         21      7.5 mg         22      10 mg         23      7.5 mg           24      7.5 mg         25      10 mg         26            27               28               29               30                 31                      Date Details   12/19 This INR check       Date of next INR:  12/26/2017         How to take your warfarin dose     To take:  7.5 mg Take 1.5 of the 5 mg tablets.    To take:  10 mg Take 2 of the 5 mg tablets.

## 2017-12-20 LAB — FIO2-PRE: 21 %

## 2017-12-22 DIAGNOSIS — Z95.811 LVAD (LEFT VENTRICULAR ASSIST DEVICE) PRESENT (H): ICD-10-CM

## 2017-12-22 DIAGNOSIS — I50.22 CHRONIC SYSTOLIC CONGESTIVE HEART FAILURE (H): ICD-10-CM

## 2017-12-22 LAB
ANION GAP SERPL CALCULATED.3IONS-SCNC: 9 MMOL/L (ref 3–14)
BUN SERPL-MCNC: 42 MG/DL (ref 7–30)
CALCIUM SERPL-MCNC: 8.5 MG/DL (ref 8.5–10.1)
CHLORIDE SERPL-SCNC: 98 MMOL/L (ref 94–109)
CO2 SERPL-SCNC: 27 MMOL/L (ref 20–32)
CREAT SERPL-MCNC: 1.66 MG/DL (ref 0.66–1.25)
GFR SERPL CREATININE-BSD FRML MDRD: 42 ML/MIN/1.7M2
GLUCOSE SERPL-MCNC: 207 MG/DL (ref 70–99)
POTASSIUM SERPL-SCNC: 4.7 MMOL/L (ref 3.4–5.3)
SODIUM SERPL-SCNC: 134 MMOL/L (ref 133–144)

## 2017-12-22 PROCEDURE — 36415 COLL VENOUS BLD VENIPUNCTURE: CPT | Performed by: INTERNAL MEDICINE

## 2017-12-22 PROCEDURE — 80048 BASIC METABOLIC PNL TOTAL CA: CPT | Performed by: INTERNAL MEDICINE

## 2018-01-03 ENCOUNTER — CARE COORDINATION (OUTPATIENT)
Dept: CARDIOLOGY | Facility: CLINIC | Age: 63
End: 2018-01-03

## 2018-01-03 NOTE — PROGRESS NOTES
Called patient/caregiver to check in 24 weeks post discharge. Pt reports VAD parameters WNL and weight stable. Reviewed medications and answered any questions. Patient reports sleeping well and no anxiety since being home with LVAD. Patient is able to move around the house and care for himself independently.      Discussed specific new problems/stressors since being discharged from the hospital: gave pt phone numbers to call for clinics for neurology, pulmonology and nutrition consults. Empathized with patient and reviewed coping strategies: enlisting support from friends and love ones, attending patient and caregiver support groups, reviewing LVAD educational materials to reinforce knowledge, and talking about concerns with family/care providers/trusted others. Encouraged pt to page VAD Coordinator with any issues or questions. Pt verbalizes understanding.

## 2018-01-05 ENCOUNTER — ANTICOAGULATION THERAPY VISIT (OUTPATIENT)
Dept: ANTICOAGULATION | Facility: CLINIC | Age: 63
End: 2018-01-05

## 2018-01-05 DIAGNOSIS — Z95.811 LVAD (LEFT VENTRICULAR ASSIST DEVICE) PRESENT (H): ICD-10-CM

## 2018-01-05 DIAGNOSIS — Z79.01 LONG-TERM (CURRENT) USE OF ANTICOAGULANTS: ICD-10-CM

## 2018-01-05 LAB — INR BLD: 2.4 (ref 0.86–1.14)

## 2018-01-05 PROCEDURE — 36416 COLLJ CAPILLARY BLOOD SPEC: CPT | Performed by: INTERNAL MEDICINE

## 2018-01-05 PROCEDURE — 85610 PROTHROMBIN TIME: CPT | Mod: QW | Performed by: INTERNAL MEDICINE

## 2018-01-05 NOTE — MR AVS SNAPSHOT
Jim Willingham   1/5/2018   Anticoagulation Therapy Visit    Description:  62 year old male   Provider:  Maged Bedolla, [facility]   Department:  [facility]           INR as of 1/5/2018     Today's INR 2.4      Anticoagulation Summary as of 1/5/2018     INR goal 2.0-3.0   Today's INR 2.4   Full instructions 10 mg on Mon, Wed, Fri; 7.5 mg all other days   Next INR check 1/12/2018    Indications   LVAD (left ventricular assist device) present (H) [Z95.811]  Long-term (current) use of anticoagulants [Z79.01] [Z79.01]         January 2018 Details    Sun Mon Tue Wed Thu Fri Sat      1               2               3               4               5      10 mg   See details      6      7.5 mg           7      7.5 mg         8      10 mg         9      7.5 mg         10      10 mg         11      7.5 mg         12            13                 14               15               16               17               18               19               20                 21               22               23               24               25               26               27                 28               29               30               31                   Date Details   01/05 This INR check       Date of next INR:  1/12/2018         How to take your warfarin dose     To take:  7.5 mg Take 1.5 of the 5 mg tablets.    To take:  10 mg Take 2 of the 5 mg tablets.

## 2018-01-05 NOTE — PROGRESS NOTES
ANTICOAGULATION FOLLOW-UP CLINIC VISIT    Patient Name:  Jim Willingham  Date:  1/5/2018  Contact Type:  Telephone    SUBJECTIVE:        OBJECTIVE    INR Point of Care   Date Value Ref Range Status   01/05/2018 2.4 (H) 0.86 - 1.14 Final     Comment:     This test is intended for monitoring Coumadin therapy.  Results are not   accurate in patients with prolonged INR due to factor deficiency.         ASSESSMENT / PLAN  INR assessment THER    Recheck INR In: 1 WEEK    INR Location Clinic      Anticoagulation Summary as of 1/5/2018     INR goal 2.0-3.0   Today's INR 2.4   Maintenance plan 10 mg (5 mg x 2) on Mon, Wed, Fri; 7.5 mg (5 mg x 1.5) all other days   Full instructions 10 mg on Mon, Wed, Fri; 7.5 mg all other days   Weekly total 60 mg   Plan last modified Maru Alonso RN (12/7/2017)   Next INR check 1/12/2018   Priority INR   Target end date     Indications   LVAD (left ventricular assist device) present (H) [Z95.811]  Long-term (current) use of anticoagulants [Z79.01] [Z79.01]         Anticoagulation Episode Summary     INR check location     Preferred lab     Send INR reminders to [facility]    Comments HIPPA Forms mailed 6/26/17  Labs drawn either at [facility] or [facility]      Anticoagulation Care Providers     Provider Role Specialty Phone number    Delisa Montgomery MD Responsible Cardiology [phone]            See the Encounter Report to view Anticoagulation Flowsheet and Dosing Calendar (Go to Encounters tab in chart review, and find the Anticoagulation Therapy Visit)    Left message for patient with results and dosing recommendations. Asked patient to call back to report any missed doses, falls, signs and symptoms of bleeding or clotting, any changes in health, medication, or diet. Asked patient to call back with any questions or concerns.    Margarita Bloom PharmD II Student  Maged Bedolla, [facility]

## 2018-01-09 DIAGNOSIS — M10.00 ACUTE IDIOPATHIC GOUT, UNSPECIFIED SITE: ICD-10-CM

## 2018-01-10 RX ORDER — ALLOPURINOL 100 MG/1
100 TABLET ORAL DAILY
Qty: 30 TABLET | Refills: 3 | Status: SHIPPED | OUTPATIENT
Start: 2018-01-10 | End: 2018-05-09

## 2018-01-12 ENCOUNTER — ANTICOAGULATION THERAPY VISIT (OUTPATIENT)
Dept: ANTICOAGULATION | Facility: CLINIC | Age: 63
End: 2018-01-12

## 2018-01-12 DIAGNOSIS — Z95.811 LVAD (LEFT VENTRICULAR ASSIST DEVICE) PRESENT (H): ICD-10-CM

## 2018-01-12 DIAGNOSIS — Z79.01 LONG-TERM (CURRENT) USE OF ANTICOAGULANTS: ICD-10-CM

## 2018-01-12 LAB — INR BLD: 1.6 (ref 0.86–1.14)

## 2018-01-12 PROCEDURE — 36416 COLLJ CAPILLARY BLOOD SPEC: CPT | Performed by: INTERNAL MEDICINE

## 2018-01-12 PROCEDURE — 85610 PROTHROMBIN TIME: CPT | Mod: QW | Performed by: INTERNAL MEDICINE

## 2018-01-12 NOTE — MR AVS SNAPSHOT
Jim Willingham   1/12/2018   Anticoagulation Therapy Visit    Description:  62 year old male   Provider:  Brendan Montoya, RN   Department:  [facility]           INR as of 1/12/2018     Today's INR 1.6!      Anticoagulation Summary as of 1/12/2018     INR goal 2.0-3.0   Today's INR 1.6!   Full instructions 1/12: 12.5 mg; 1/13: 7.5 mg; 1/14: 7.5 mg; 1/15: 7.5 mg   Next INR check 1/16/2018    Indications   LVAD (left ventricular assist device) present (H) [Z95.811]  Long-term (current) use of anticoagulants [Z79.01] [Z79.01]         January 2018 Details    Sun Mon Tue Wed Thu Fri Sat      1               2               3               4               5               6                 7               8               9               10               11               12      12.5 mg   See details      13      7.5 mg           14      7.5 mg         15      7.5 mg         16            17               18               19               20                 21               22               23               24               25               26               27                 28               29               30               31                   Date Details   01/12 This INR check       Date of next INR:  1/16/2018         How to take your warfarin dose     To take:  7.5 mg Take 1.5 of the 5 mg tablets.    To take:  12.5 mg Take 2.5 of the 5 mg tablets.

## 2018-01-12 NOTE — PROGRESS NOTES
ANTICOAGULATION FOLLOW-UP CLINIC VISIT    Patient Name:  Jim Willingham  Date:  1/12/2018  Contact Type:  Telephone    SUBJECTIVE:     Patient Findings     Positives Change in medications (Patient is on Lovenox 120mg every 12 hours for subtherapeutic INR result. He takes 40mg of Prednisone.  Antibiotic course comleted on 1/14.), Unexplained INR or factor level change (Subtherapeutic INR result 1.6.)    Comments Spoke to Jim.  Instructed him to increase Aspirin to 325mg.  He will begin Lovenox injections this evening.  Refilled prescription.  Increased Coumadin one time today.  Patient verbalized understanding of instructions.           OBJECTIVE    INR Point of Care   Date Value Ref Range Status   01/12/2018 1.6 (H) 0.86 - 1.14 Final     Comment:     This test is intended for monitoring Coumadin therapy.  Results are not   accurate in patients with prolonged INR due to factor deficiency.         ASSESSMENT / PLAN  INR assessment SUB    Recheck INR In: 4 DAYS    INR Location Clinic      Anticoagulation Summary as of 1/12/2018     INR goal 2.0-3.0   Today's INR 1.6!   Maintenance plan No maintenance plan   Full instructions 1/12: 12.5 mg; 1/13: 7.5 mg; 1/14: 7.5 mg; 1/15: 7.5 mg   Plan last modified Brendan Montoya RN (1/12/2018)   Next INR check 1/16/2018   Priority INR   Target end date     Indications   LVAD (left ventricular assist device) present (H) [Z95.811]  Long-term (current) use of anticoagulants [Z79.01] [Z79.01]         Anticoagulation Episode Summary     INR check location     Preferred lab     Send INR reminders to [facility]    Comments HIPPA Forms mailed 6/26/17  Labs drawn either at [facility] or [facility]      Anticoagulation Care Providers     Provider Role Specialty Phone number    Delisa Montgomery MD Responsible Cardiology [phone]            See the Encounter Report to view Anticoagulation Flowsheet and Dosing Calendar (Go to Encounters tab in chart review,  and find the Anticoagulation Therapy Visit)    Spoke with patient. Jim will start Lovenox today, and increase his Aspirin and Coumadin.  Will check on Tuesday. Gave them their lab results and new warfarin recommendation.  No changes in health, medication, or diet. No missed doses, no falls. No signs or symptoms of bleed or clotting.    Brendan Montoya, RN

## 2018-01-16 ENCOUNTER — ANTICOAGULATION THERAPY VISIT (OUTPATIENT)
Dept: ANTICOAGULATION | Facility: CLINIC | Age: 63
End: 2018-01-16

## 2018-01-16 DIAGNOSIS — Z95.811 LVAD (LEFT VENTRICULAR ASSIST DEVICE) PRESENT (H): ICD-10-CM

## 2018-01-16 DIAGNOSIS — Z79.01 LONG-TERM (CURRENT) USE OF ANTICOAGULANTS: ICD-10-CM

## 2018-01-16 LAB — INR BLD: 2.3 (ref 0.86–1.14)

## 2018-01-16 PROCEDURE — 36416 COLLJ CAPILLARY BLOOD SPEC: CPT | Performed by: INTERNAL MEDICINE

## 2018-01-16 PROCEDURE — 85610 PROTHROMBIN TIME: CPT | Mod: QW | Performed by: INTERNAL MEDICINE

## 2018-01-16 NOTE — PROGRESS NOTES
ANTICOAGULATION FOLLOW-UP CLINIC VISIT    Patient Name:  Jim Willingham  Date:  1/16/2018  Contact Type:  Telephone    SUBJECTIVE:     Patient Findings     Positives Change in medications (Instructed Jim to discontinue Lovenox.)    Comments Spoke to Jim.  Instructed Jim to discontinue Lovenox injections and decrease Aspirin to 81mg.  Will check an INR next Wednesday.           OBJECTIVE    INR Point of Care   Date Value Ref Range Status   01/16/2018 2.3 (H) 0.86 - 1.14 Final     Comment:     This test is intended for monitoring Coumadin therapy.  Results are not   accurate in patients with prolonged INR due to factor deficiency.         ASSESSMENT / PLAN  INR assessment THER    Recheck INR In: 1 WEEK    INR Location Clinic      Anticoagulation Summary as of 1/16/2018     INR goal 2.0-3.0   Today's INR 2.3   Maintenance plan 10 mg (5 mg x 2) on Mon, Wed, Fri; 7.5 mg (5 mg x 1.5) all other days   Full instructions 10 mg on Mon, Wed, Fri; 7.5 mg all other days   Weekly total 60 mg   Plan last modified Brendan Montoya RN (1/16/2018)   Next INR check 1/23/2018   Priority INR   Target end date     Indications   LVAD (left ventricular assist device) present (H) [Z95.811]  Long-term (current) use of anticoagulants [Z79.01] [Z79.01]         Anticoagulation Episode Summary     INR check location     Preferred lab     Send INR reminders to [facility]    Comments HIPPA Forms mailed 6/26/17  Labs drawn either at [facility] or [facility]      Anticoagulation Care Providers     Provider Role Specialty Phone number    Delisa Montgomery MD [facility] Cardiology [phone]            See the Encounter Report to view Anticoagulation Flowsheet and Dosing Calendar (Go to Encounters tab in chart review, and find the Anticoagulation Therapy Visit)    Spoke with patient. Gave them their lab results and new warfarin recommendation.  No changes in health, medication, or diet. No missed doses, no falls.  No signs or symptoms of bleed or clotting.    Brendan Montoya, RN

## 2018-01-16 NOTE — MR AVS SNAPSHOT
Jim Willingham   1/16/2018   Anticoagulation Therapy Visit    Description:  62 year old male   Provider:  Brendan Montoya, RN   Department:  [facility]           INR as of 1/16/2018     Today's INR 2.3      Anticoagulation Summary as of 1/16/2018     INR goal 2.0-3.0   Today's INR 2.3   Full instructions 10 mg on Mon, Wed, Fri; 7.5 mg all other days   Next INR check 1/23/2018    Indications   LVAD (left ventricular assist device) present (H) [Z95.811]  Long-term (current) use of anticoagulants [Z79.01] [Z79.01]         January 2018 Details    Sun Mon Tue Wed Thu Fri Sat      1               2               3               4               5               6                 7               8               9               10               11               12               13                 14               15               16      7.5 mg   See details      17      10 mg         18      7.5 mg         19      10 mg         20      7.5 mg           21      7.5 mg         22      10 mg         23            24               25               26               27                 28               29               30               31                   Date Details   01/16 This INR check       Date of next INR:  1/23/2018         How to take your warfarin dose     To take:  7.5 mg Take 1.5 of the 5 mg tablets.    To take:  10 mg Take 2 of the 5 mg tablets.

## 2018-01-23 ENCOUNTER — ANTICOAGULATION THERAPY VISIT (OUTPATIENT)
Dept: ANTICOAGULATION | Facility: CLINIC | Age: 63
End: 2018-01-23

## 2018-01-23 DIAGNOSIS — Z95.811 LVAD (LEFT VENTRICULAR ASSIST DEVICE) PRESENT (H): ICD-10-CM

## 2018-01-23 DIAGNOSIS — Z79.01 LONG-TERM (CURRENT) USE OF ANTICOAGULANTS: ICD-10-CM

## 2018-01-23 LAB — INR BLD: 1.9 (ref 0.86–1.14)

## 2018-01-23 PROCEDURE — 36416 COLLJ CAPILLARY BLOOD SPEC: CPT | Performed by: INTERNAL MEDICINE

## 2018-01-23 PROCEDURE — 85610 PROTHROMBIN TIME: CPT | Mod: QW | Performed by: INTERNAL MEDICINE

## 2018-01-23 NOTE — PROGRESS NOTES
ANTICOAGULATION FOLLOW-UP CLINIC VISIT    Patient Name:  Jim Willingham  Date:  1/23/2018  Contact Type:  Telephone    SUBJECTIVE:     Patient Findings     Comments Left message for Jim to increase Aspirin 325mg, and take 10mg of Coumadin today and tomorrow, 7.5mg on Thursday.  Recheck INR on Friday.           OBJECTIVE    INR Point of Care   Date Value Ref Range Status   01/23/2018 1.9 (H) 0.86 - 1.14 Final     Comment:     This test is intended for monitoring Coumadin therapy.  Results are not   accurate in patients with prolonged INR due to factor deficiency.         ASSESSMENT / PLAN  INR assessment THER    Recheck INR In: 3 DAYS    INR Location Clinic      Anticoagulation Summary as of 1/23/2018     INR goal 2.0-3.0   Today's INR 1.9!   Maintenance plan No maintenance plan   Full instructions 1/23: 10 mg; 1/24: 10 mg; 1/25: 7.5 mg   Plan last modified Brendan Montoya RN (1/23/2018)   Next INR check 1/26/2018   Priority INR   Target end date     Indications   LVAD (left ventricular assist device) present (H) [Z95.811]  Long-term (current) use of anticoagulants [Z79.01] [Z79.01]         Anticoagulation Episode Summary     INR check location     Preferred lab     Send INR reminders to [facility]    Comments HIPPA Forms mailed 6/26/17  Labs drawn either at [facility] or [facility]      Anticoagulation Care Providers     Provider Role Specialty Phone number    Delisa Montgomery MD Responsible Cardiology [phone]            See the Encounter Report to view Anticoagulation Flowsheet and Dosing Calendar (Go to Encounters tab in chart review, and find the Anticoagulation Therapy Visit)    Left message for patient with results and dosing recommendations. Asked patient to call back to report any missed doses, falls, signs and symptoms of bleeding or clotting, any changes in health, medication, or diet. Asked patient to call back with any questions or concerns.    Brendan Montoya RN

## 2018-01-23 NOTE — MR AVS SNAPSHOT
Jim Willingham   1/23/2018   Anticoagulation Therapy Visit    Description:  62 year old male   Provider:  Brendan Montoya, RN   Department:  [facility]           INR as of 1/23/2018     Today's INR 1.9!      Anticoagulation Summary as of 1/23/2018     INR goal 2.0-3.0   Today's INR 1.9!   Full instructions 1/23: 10 mg; 1/24: 10 mg; 1/25: 7.5 mg   Next INR check 1/26/2018    Indications   LVAD (left ventricular assist device) present (H) [Z95.811]  Long-term (current) use of anticoagulants [Z79.01] [Z79.01]         January 2018 Details    Sun Mon Tue Wed Thu Fri Sat      1               2               3               4               5               6                 7               8               9               10               11               12               13                 14               15               16               17               18               19               20                 21               22               23      10 mg   See details      24      10 mg         25      7.5 mg         26            27                 28               29               30               31                   Date Details   01/23 This INR check       Date of next INR:  1/26/2018         How to take your warfarin dose     To take:  7.5 mg Take 1.5 of the 5 mg tablets.    To take:  10 mg Take 2 of the 5 mg tablets.

## 2018-01-26 ENCOUNTER — ANTICOAGULATION THERAPY VISIT (OUTPATIENT)
Dept: ANTICOAGULATION | Facility: CLINIC | Age: 63
End: 2018-01-26

## 2018-01-26 DIAGNOSIS — Z79.01 LONG-TERM (CURRENT) USE OF ANTICOAGULANTS: ICD-10-CM

## 2018-01-26 DIAGNOSIS — Z95.811 LVAD (LEFT VENTRICULAR ASSIST DEVICE) PRESENT (H): ICD-10-CM

## 2018-01-26 LAB — INR BLD: 2.7 (ref 0.86–1.14)

## 2018-01-26 PROCEDURE — 85610 PROTHROMBIN TIME: CPT | Mod: QW | Performed by: INTERNAL MEDICINE

## 2018-01-26 PROCEDURE — 36416 COLLJ CAPILLARY BLOOD SPEC: CPT | Performed by: INTERNAL MEDICINE

## 2018-01-26 NOTE — MR AVS SNAPSHOT
Jim Willingham   1/26/2018   Anticoagulation Therapy Visit    Description:  62 year old male   Provider:  Maru Alonso, RN   Department:  [facility]           INR as of 1/26/2018     Today's INR 2.7      Anticoagulation Summary as of 1/26/2018     INR goal 2.0-3.0   Today's INR 2.7   Full instructions 7.5 mg on Mon, Wed, Fri; 10 mg all other days   Next INR check 2/2/2018    Indications   LVAD (left ventricular assist device) present (H) [Z95.811]  Long-term (current) use of anticoagulants [Z79.01] [Z79.01]         January 2018 Details    Sun Mon Tue Wed Thu Fri Sat      1               2               3               4               5               6                 7               8               9               10               11               12               13                 14               15               16               17               18               19               20                 21               22               23               24               25               26      7.5 mg   See details      27      10 mg           28      10 mg         29      7.5 mg         30      10 mg         31      7.5 mg             Date Details   01/26 This INR check               How to take your warfarin dose     To take:  7.5 mg Take 1.5 of the 5 mg tablets.    To take:  10 mg Take 2 of the 5 mg tablets.           February 2018 Details    Sun Mon Tue Wed Thu Fri Sat         1      10 mg         2            3                 4               5               6               7               8               9               10                 11               12               13               14               15               16               17                 18               19               20               21               22               23               24                 25               26               27               28                   Date Details   No additional details    Date of  next INR:  2/2/2018         How to take your warfarin dose     To take:  7.5 mg Take 1.5 of the 5 mg tablets.    To take:  10 mg Take 2 of the 5 mg tablets.

## 2018-01-26 NOTE — PROGRESS NOTES
ANTICOAGULATION FOLLOW-UP CLINIC VISIT    Patient Name:  Jim Willingham  Date:  1/26/2018  Contact Type:  Telephone    SUBJECTIVE:        OBJECTIVE    INR Point of Care   Date Value Ref Range Status   01/26/2018 2.7 (H) 0.86 - 1.14 Final     Comment:     This test is intended for monitoring Coumadin therapy.  Results are not   accurate in patients with prolonged INR due to factor deficiency.         ASSESSMENT / PLAN  No question data found.  Anticoagulation Summary as of 1/26/2018     INR goal 2.0-3.0   Today's INR 2.7   Maintenance plan 7.5 mg (5 mg x 1.5) on Mon, Wed, Fri; 10 mg (5 mg x 2) all other days   Full instructions 7.5 mg on Mon, Wed, Fri; 10 mg all other days   Weekly total 62.5 mg   Plan last modified Maru Alonso RN (1/26/2018)   Next INR check 2/2/2018   Priority INR   Target end date     Indications   LVAD (left ventricular assist device) present (H) [Z95.811]  Long-term (current) use of anticoagulants [Z79.01] [Z79.01]         Anticoagulation Episode Summary     INR check location     Preferred lab     Send INR reminders to [facility]    Comments HIPPA Forms mailed 6/26/17  Labs drawn either at [facility] or [facility]      Anticoagulation Care Providers     Provider Role Specialty Phone number    Delisa Montgomery MD Responsible Cardiology [phone]            See the Encounter Report to view Anticoagulation Flowsheet and Dosing Calendar (Go to Encounters tab in chart review, and find the Anticoagulation Therapy Visit)    Spoke with Jim.  Jim will resume ASA 81mg daily.    Maru Alonso RN

## 2018-01-31 DIAGNOSIS — I50.22 CHRONIC SYSTOLIC CONGESTIVE HEART FAILURE (H): ICD-10-CM

## 2018-01-31 DIAGNOSIS — Z95.811 LVAD (LEFT VENTRICULAR ASSIST DEVICE) PRESENT (H): ICD-10-CM

## 2018-02-02 ENCOUNTER — ANTICOAGULATION THERAPY VISIT (OUTPATIENT)
Dept: ANTICOAGULATION | Facility: CLINIC | Age: 63
End: 2018-02-02

## 2018-02-02 DIAGNOSIS — Z95.811 LVAD (LEFT VENTRICULAR ASSIST DEVICE) PRESENT (H): ICD-10-CM

## 2018-02-02 DIAGNOSIS — Z79.01 LONG-TERM (CURRENT) USE OF ANTICOAGULANTS: ICD-10-CM

## 2018-02-02 LAB — INR BLD: 3 (ref 0.86–1.14)

## 2018-02-02 PROCEDURE — 36416 COLLJ CAPILLARY BLOOD SPEC: CPT | Performed by: INTERNAL MEDICINE

## 2018-02-02 PROCEDURE — 85610 PROTHROMBIN TIME: CPT | Mod: QW | Performed by: INTERNAL MEDICINE

## 2018-02-02 NOTE — MR AVS SNAPSHOT
Jim Willingham   2/2/2018   Anticoagulation Therapy Visit    Description:  62 year old male   Provider:  Brendan Montoya   Department:  [facility]           INR as of 2/2/2018     Today's INR 3.0      Anticoagulation Summary as of 2/2/2018     INR goal 2.0-3.0   Today's INR 3.0   Full instructions 7.5 mg on Mon, Wed, Fri; 10 mg all other days   Next INR check 2/9/2018    Indications   LVAD (left ventricular assist device) present (H) [Z95.811]  Long-term (current) use of anticoagulants [Z79.01] [Z79.01]         February 2018 Details    Sun Mon Tue Wed Thu Fri Sat         1               2      7.5 mg   See details      3      10 mg           4      10 mg         5      7.5 mg         6      10 mg         7      7.5 mg         8      10 mg         9            10                 11               12               13               14               15               16               17                 18               19               20               21               22               23               24                 25               26               27               28                   Date Details   02/02 This INR check       Date of next INR:  2/9/2018         How to take your warfarin dose     To take:  7.5 mg Take 1.5 of the 5 mg tablets.    To take:  10 mg Take 2 of the 5 mg tablets.

## 2018-02-02 NOTE — PROGRESS NOTES
ANTICOAGULATION FOLLOW-UP CLINIC VISIT    Patient Name:  Jim Willingham  Date:  2/2/2018  Contact Type:  Telephone    SUBJECTIVE:     Patient Findings     Positives No Problem Findings    Comments Spoke to Jim.  Did not make any adjustments to his Coumadin dosing.  Encouraged him to have a large portion of leafy green vegetables, and check in one week.  Rationale for not making any changes is historically patient has been adjusted several times in the past weeks.  Attempting at consistency, by not making any adjustments this week.           OBJECTIVE    INR Point of Care   Date Value Ref Range Status   02/02/2018 3.0 (H) 0.86 - 1.14 Final     Comment:     This test is intended for monitoring Coumadin therapy.  Results are not   accurate in patients with prolonged INR due to factor deficiency.         ASSESSMENT / PLAN  INR assessment THER    Recheck INR In: 1 WEEK    INR Location Clinic      Anticoagulation Summary as of 2/2/2018     INR goal 2.0-3.0   Today's INR 3.0   Maintenance plan 7.5 mg (5 mg x 1.5) on Mon, Wed, Fri; 10 mg (5 mg x 2) all other days   Full instructions 7.5 mg on Mon, Wed, Fri; 10 mg all other days   Weekly total 62.5 mg   No change documented Brendan Montoya RN   Plan last modified Maru Alonso RN (1/26/2018)   Next INR check 2/9/2018   Priority INR   Target end date     Indications   LVAD (left ventricular assist device) present (H) [Z95.811]  Long-term (current) use of anticoagulants [Z79.01] [Z79.01]         Anticoagulation Episode Summary     INR check location     Preferred lab     Send INR reminders to [facility]    Comments HIPPA Forms mailed 6/26/17  Labs drawn either at [facility] or [facility]      Anticoagulation Care Providers     Provider Role Specialty Phone number    Delisa Montgomery MD Responsible Cardiology [phone]            See the Encounter Report to view Anticoagulation Flowsheet and Dosing Calendar (Go to Encounters tab in chart  review, and find the Anticoagulation Therapy Visit)    Spoke with patient. Gave them their lab results and new warfarin recommendation.  No changes in health, medication, or diet. No missed doses, no falls. No signs or symptoms of bleed or clotting.    Brendan Montoya, RN

## 2018-02-06 ENCOUNTER — CARE COORDINATION (OUTPATIENT)
Dept: CARDIOLOGY | Facility: CLINIC | Age: 63
End: 2018-02-06

## 2018-02-06 DIAGNOSIS — I50.22 CHRONIC SYSTOLIC HEART FAILURE (H): Primary | ICD-10-CM

## 2018-02-06 RX ORDER — LOSARTAN POTASSIUM 25 MG/1
25 TABLET ORAL DAILY
Qty: 90 TABLET | Refills: 3 | Status: SHIPPED | OUTPATIENT
Start: 2018-02-06 | End: 2018-08-06

## 2018-02-06 NOTE — PROGRESS NOTES
D: Pt called to report SOB. This has been baseline for pt. He has been instructed to make an appointment with pulmonology but has not been able make it. He states  has been an issue. He is working out a plan for  and states he will call for an appointment once that is sorted out. His weight has been stable between 200 and 205lbs. He does not have any edema in his ankles or feet but believes he is has some fluid in his abdomen.   I: Discussed with Dr. Montgomery. Pt was instructed to increase Bumex to 2mg daily (currently alternating 2mg daily, then 1mg the next day).   A: Pt verbalized understanding.   P: Will continue to monitor. Pt will have BMP drawn on Friday.

## 2018-02-09 ENCOUNTER — ANTICOAGULATION THERAPY VISIT (OUTPATIENT)
Dept: ANTICOAGULATION | Facility: CLINIC | Age: 63
End: 2018-02-09

## 2018-02-09 DIAGNOSIS — Z79.01 LONG-TERM (CURRENT) USE OF ANTICOAGULANTS: ICD-10-CM

## 2018-02-09 DIAGNOSIS — Z95.811 LVAD (LEFT VENTRICULAR ASSIST DEVICE) PRESENT (H): ICD-10-CM

## 2018-02-09 DIAGNOSIS — I50.22 CHRONIC SYSTOLIC HEART FAILURE (H): ICD-10-CM

## 2018-02-09 LAB
ANION GAP SERPL CALCULATED.3IONS-SCNC: 4 MMOL/L (ref 3–14)
BUN SERPL-MCNC: 31 MG/DL (ref 7–30)
CALCIUM SERPL-MCNC: 8.3 MG/DL (ref 8.5–10.1)
CHLORIDE SERPL-SCNC: 106 MMOL/L (ref 94–109)
CO2 SERPL-SCNC: 30 MMOL/L (ref 20–32)
CREAT SERPL-MCNC: 1.29 MG/DL (ref 0.66–1.25)
GFR SERPL CREATININE-BSD FRML MDRD: 57 ML/MIN/1.7M2
GLUCOSE SERPL-MCNC: 163 MG/DL (ref 70–99)
INR PPP: 2.18 (ref 0.86–1.14)
POTASSIUM SERPL-SCNC: 4.2 MMOL/L (ref 3.4–5.3)
SODIUM SERPL-SCNC: 140 MMOL/L (ref 133–144)

## 2018-02-09 PROCEDURE — 36415 COLL VENOUS BLD VENIPUNCTURE: CPT | Performed by: INTERNAL MEDICINE

## 2018-02-09 PROCEDURE — 80048 BASIC METABOLIC PNL TOTAL CA: CPT | Performed by: INTERNAL MEDICINE

## 2018-02-09 PROCEDURE — 85610 PROTHROMBIN TIME: CPT | Performed by: INTERNAL MEDICINE

## 2018-02-09 NOTE — PROGRESS NOTES
ANTICOAGULATION FOLLOW-UP CLINIC VISIT    Patient Name:  Jim Willingham  Date:  2/9/2018  Contact Type:  Telephone    SUBJECTIVE:     Patient Findings     Comments Spoke to Jim.  Did not adjust his Coumadin.           OBJECTIVE    INR   Date Value Ref Range Status   02/09/2018 2.18 (H) 0.86 - 1.14 Final       ASSESSMENT / PLAN  INR assessment THER    Recheck INR In: 1 WEEK    INR Location Clinic      Anticoagulation Summary as of 2/9/2018     INR goal 2.0-3.0   Today's INR 2.18   Maintenance plan 7.5 mg (5 mg x 1.5) on Mon, Wed, Fri; 10 mg (5 mg x 2) all other days   Full instructions 7.5 mg on Mon, Wed, Fri; 10 mg all other days   Weekly total 62.5 mg   No change documented Brendan Montoya RN   Plan last modified Maru Alonso RN (1/26/2018)   Next INR check 2/16/2018   Priority INR   Target end date     Indications   LVAD (left ventricular assist device) present (H) [Z95.811]  Long-term (current) use of anticoagulants [Z79.01] [Z79.01]         Anticoagulation Episode Summary     INR check location     Preferred lab     Send INR reminders to [facility]    Comments HIPPA Forms mailed 6/26/17  Labs drawn either at [facility] or [facility]      Anticoagulation Care Providers     Provider Role Specialty Phone number    Delisa Montgomery MD Responsible Cardiology [phone]            See the Encounter Report to view Anticoagulation Flowsheet and Dosing Calendar (Go to Encounters tab in chart review, and find the Anticoagulation Therapy Visit)    Spoke with patient. Gave them their lab results and new warfarin recommendation.  No changes in health, medication, or diet. No missed doses, no falls. No signs or symptoms of bleed or clotting.    Brendan Montoya, RN

## 2018-02-09 NOTE — MR AVS SNAPSHOT
Jim Willingham   2/9/2018   Anticoagulation Therapy Visit    Description:  62 year old male   Provider:  Brendan Montoya   Department:  [facility]           INR as of 2/9/2018     Today's INR 2.18      Anticoagulation Summary as of 2/9/2018     INR goal 2.0-3.0   Today's INR 2.18   Full instructions 7.5 mg on Mon, Wed, Fri; 10 mg all other days   Next INR check 2/16/2018    Indications   LVAD (left ventricular assist device) present (H) [Z95.811]  Long-term (current) use of anticoagulants [Z79.01] [Z79.01]         February 2018 Details    Sun Mon Tue Wed Thu Fri Sat         1               2               3                 4               5               6               7               8               9      7.5 mg   See details      10      10 mg           11      10 mg         12      7.5 mg         13      10 mg         14      7.5 mg         15      10 mg         16            17                 18               19               20               21               22               23               24                 25               26               27               28                   Date Details   02/09 This INR check       Date of next INR:  2/16/2018         How to take your warfarin dose     To take:  7.5 mg Take 1.5 of the 5 mg tablets.    To take:  10 mg Take 2 of the 5 mg tablets.

## 2018-02-16 ENCOUNTER — ANTICOAGULATION THERAPY VISIT (OUTPATIENT)
Dept: ANTICOAGULATION | Facility: CLINIC | Age: 63
End: 2018-02-16

## 2018-02-16 DIAGNOSIS — Z79.01 LONG-TERM (CURRENT) USE OF ANTICOAGULANTS: ICD-10-CM

## 2018-02-16 DIAGNOSIS — Z95.811 LVAD (LEFT VENTRICULAR ASSIST DEVICE) PRESENT (H): ICD-10-CM

## 2018-02-16 LAB — INR BLD: 2.9 (ref 0.86–1.14)

## 2018-02-16 PROCEDURE — 36416 COLLJ CAPILLARY BLOOD SPEC: CPT | Performed by: INTERNAL MEDICINE

## 2018-02-16 PROCEDURE — 85610 PROTHROMBIN TIME: CPT | Mod: QW | Performed by: INTERNAL MEDICINE

## 2018-02-16 NOTE — MR AVS SNAPSHOT
Jim Willingham   2/16/2018   Anticoagulation Therapy Visit    Description:  62 year old male   Provider:  Delisa Hillman, RN   Department:  [facility]           INR as of 2/16/2018     Today's INR 2.9      Anticoagulation Summary as of 2/16/2018     INR goal 2.0-3.0   Today's INR 2.9   Full instructions 7.5 mg on Mon, Wed, Fri; 10 mg all other days   Next INR check 3/2/2018    Indications   LVAD (left ventricular assist device) present (H) [Z95.811]  Long-term (current) use of anticoagulants [Z79.01] [Z79.01]         February 2018 Details    Sun Mon Tue Wed Thu Fri Sat         1               2               3                 4               5               6               7               8               9               10                 11               12               13               14               15               16      7.5 mg   See details      17      10 mg           18      10 mg         19      7.5 mg         20      10 mg         21      7.5 mg         22      10 mg         23      7.5 mg         24      10 mg           25      10 mg         26      7.5 mg         27      10 mg         28      7.5 mg             Date Details   02/16 This INR check               How to take your warfarin dose     To take:  7.5 mg Take 1.5 of the 5 mg tablets.    To take:  10 mg Take 2 of the 5 mg tablets.           March 2018 Details    Sun Mon Tue Wed Thu Fri Sat         1      10 mg         2            3                 4               5               6               7               8               9               10                 11               12               13               14               15               16               17                 18               19               20               21               22               23               24                 25               26               27               28               29               30               31                Date  Details   No additional details    Date of next INR:  3/2/2018         How to take your warfarin dose     To take:  7.5 mg Take 1.5 of the 5 mg tablets.    To take:  10 mg Take 2 of the 5 mg tablets.

## 2018-02-16 NOTE — PROGRESS NOTES
ANTICOAGULATION FOLLOW-UP CLINIC VISIT    Patient Name:  Jim Willingham  Date:  2/16/2018  Contact Type:  Telephone    SUBJECTIVE:     Patient Findings     Positives No Problem Findings           OBJECTIVE    INR Point of Care   Date Value Ref Range Status   02/16/2018 2.9 (H) 0.86 - 1.14 Final     Comment:     This test is intended for monitoring Coumadin therapy.  Results are not   accurate in patients with prolonged INR due to factor deficiency.         ASSESSMENT / PLAN  INR assessment THER    Recheck INR In: 2 WEEKS    INR Location Clinic      Anticoagulation Summary as of 2/16/2018     INR goal 2.0-3.0   Today's INR 2.9   Maintenance plan 7.5 mg (5 mg x 1.5) on Mon, Wed, Fri; 10 mg (5 mg x 2) all other days   Full instructions 7.5 mg on Mon, Wed, Fri; 10 mg all other days   Weekly total 62.5 mg   Plan last modified Maru Alonso RN (1/26/2018)   Next INR check 3/2/2018   Priority INR   Target end date     Indications   LVAD (left ventricular assist device) present (H) [Z95.811]  Long-term (current) use of anticoagulants [Z79.01] [Z79.01]         Anticoagulation Episode Summary     INR check location     Preferred lab     Send INR reminders to [facility]    Comments HIPPA Forms mailed 6/26/17  Labs drawn either at [facility] or [facility]      Anticoagulation Care Providers     Provider Role Specialty Phone number    Delisa Montgomery MD Responsible Cardiology [phone]            See the Encounter Report to view Anticoagulation Flowsheet and Dosing Calendar (Go to Encounters tab in chart review, and find the Anticoagulation Therapy Visit)    Spoke with patient.    Delisa Hillman RN

## 2018-03-01 DIAGNOSIS — I50.22 CHRONIC SYSTOLIC CONGESTIVE HEART FAILURE (H): Primary | ICD-10-CM

## 2018-03-01 DIAGNOSIS — Z95.811 LVAD (LEFT VENTRICULAR ASSIST DEVICE) PRESENT (H): ICD-10-CM

## 2018-03-02 ENCOUNTER — ANTICOAGULATION THERAPY VISIT (OUTPATIENT)
Dept: ANTICOAGULATION | Facility: CLINIC | Age: 63
End: 2018-03-02

## 2018-03-02 DIAGNOSIS — J40 BRONCHITIS: ICD-10-CM

## 2018-03-02 DIAGNOSIS — Z79.01 LONG-TERM (CURRENT) USE OF ANTICOAGULANTS: ICD-10-CM

## 2018-03-02 DIAGNOSIS — Z95.811 LVAD (LEFT VENTRICULAR ASSIST DEVICE) PRESENT (H): ICD-10-CM

## 2018-03-02 DIAGNOSIS — J45.909 ASTHMA: Primary | ICD-10-CM

## 2018-03-02 LAB — INR BLD: 2.8 (ref 0.86–1.14)

## 2018-03-02 PROCEDURE — 85610 PROTHROMBIN TIME: CPT | Mod: QW | Performed by: INTERNAL MEDICINE

## 2018-03-02 PROCEDURE — 36416 COLLJ CAPILLARY BLOOD SPEC: CPT | Performed by: INTERNAL MEDICINE

## 2018-03-02 NOTE — PROGRESS NOTES
ANTICOAGULATION FOLLOW-UP CLINIC VISIT    Patient Name:  Jim Willingham  Date:  3/2/2018  Contact Type:  Telephone    SUBJECTIVE:     Patient Findings     Positives No Problem Findings           OBJECTIVE    INR Point of Care   Date Value Ref Range Status   03/02/2018 2.8 (H) 0.86 - 1.14 Final     Comment:     This test is intended for monitoring Coumadin therapy.  Results are not   accurate in patients with prolonged INR due to factor deficiency.         ASSESSMENT / PLAN  INR assessment THER    Recheck INR In: 2 WEEKS    INR Location Clinic      Anticoagulation Summary as of 3/2/2018     INR goal 2.0-3.0   Today's INR 2.8   Maintenance plan 7.5 mg (5 mg x 1.5) on Mon, Wed, Fri; 10 mg (5 mg x 2) all other days   Full instructions 7.5 mg on Mon, Wed, Fri; 10 mg all other days   Weekly total 62.5 mg   No change documented Brendan Montoya RN   Plan last modified Maru Alonso RN (1/26/2018)   Next INR check 3/16/2018   Priority INR   Target end date     Indications   LVAD (left ventricular assist device) present (H) [Z95.811]  Long-term (current) use of anticoagulants [Z79.01] [Z79.01]         Anticoagulation Episode Summary     INR check location     Preferred lab     Send INR reminders to [facility]    Comments HIPPA Forms mailed 6/26/17  Labs drawn either at [facility] or [facility]      Anticoagulation Care Providers     Provider Role Specialty Phone number    Delisa Montgomery MD Responsible Cardiology [phone]            See the Encounter Report to view Anticoagulation Flowsheet and Dosing Calendar (Go to Encounters tab in chart review, and find the Anticoagulation Therapy Visit)    Spoke with patient. Gave them their lab results and new warfarin recommendation.  No changes in health, medication, or diet. No missed doses, no falls. No signs or symptoms of bleed or clotting.    Brendan Montoya RN

## 2018-03-02 NOTE — MR AVS SNAPSHOT
Jim Willingham   3/2/2018   Anticoagulation Therapy Visit    Description:  62 year old male   Provider:  Brendan Montoya   Department:  [facility]           INR as of 3/2/2018     Today's INR 2.8      Anticoagulation Summary as of 3/2/2018     INR goal 2.0-3.0   Today's INR 2.8   Full instructions 7.5 mg on Mon, Wed, Fri; 10 mg all other days   Next INR check 3/16/2018    Indications   LVAD (left ventricular assist device) present (H) [Z95.811]  Long-term (current) use of anticoagulants [Z79.01] [Z79.01]         March 2018 Details    Sun Mon Tue Wed Thu Fri Sat         1               2      7.5 mg   See details      3      10 mg           4      10 mg         5      7.5 mg         6      10 mg         7      7.5 mg         8      10 mg         9      7.5 mg         10      10 mg           11      10 mg         12      7.5 mg         13      10 mg         14      7.5 mg         15      10 mg         16            17                 18               19               20               21               22               23               24                 25               26               27               28               29               30               31                Date Details   03/02 This INR check       Date of next INR:  3/16/2018         How to take your warfarin dose     To take:  7.5 mg Take 1.5 of the 5 mg tablets.    To take:  10 mg Take 2 of the 5 mg tablets.

## 2018-03-09 ENCOUNTER — ANTICOAGULATION THERAPY VISIT (OUTPATIENT)
Dept: ANTICOAGULATION | Facility: CLINIC | Age: 63
End: 2018-03-09

## 2018-03-09 DIAGNOSIS — Z79.01 LONG-TERM (CURRENT) USE OF ANTICOAGULANTS: ICD-10-CM

## 2018-03-09 DIAGNOSIS — Z95.811 LVAD (LEFT VENTRICULAR ASSIST DEVICE) PRESENT (H): ICD-10-CM

## 2018-03-09 LAB — INR BLD: 3.8 (ref 0.86–1.14)

## 2018-03-09 PROCEDURE — 36416 COLLJ CAPILLARY BLOOD SPEC: CPT | Performed by: INTERNAL MEDICINE

## 2018-03-09 PROCEDURE — 85610 PROTHROMBIN TIME: CPT | Mod: QW | Performed by: INTERNAL MEDICINE

## 2018-03-09 NOTE — MR AVS SNAPSHOT
Jim Willingham   3/9/2018   Anticoagulation Therapy Visit    Description:  62 year old male   Provider:  Maru Alonso, RN   Department:  [facility]           INR as of 3/9/2018     Today's INR 3.8!      Anticoagulation Summary as of 3/9/2018     INR goal 2.0-3.0   Today's INR 3.8!   Full instructions 3/9: 5 mg; Otherwise 7.5 mg on Mon, Wed, Fri; 10 mg all other days   Next INR check 3/16/2018    Indications   LVAD (left ventricular assist device) present (H) [Z95.811]  Long-term (current) use of anticoagulants [Z79.01] [Z79.01]         March 2018 Details    Sun Mon Tue Wed Thu Fri Sat         1               2               3                 4               5               6               7               8               9      5 mg   See details      10      10 mg           11      10 mg         12      7.5 mg         13      10 mg         14      7.5 mg         15      10 mg         16            17                 18               19               20               21               22               23               24                 25               26               27               28               29               30               31                Date Details   03/09 This INR check       Date of next INR:  3/16/2018         How to take your warfarin dose     To take:  5 mg Take 1 of the 5 mg tablets.    To take:  7.5 mg Take 1.5 of the 5 mg tablets.    To take:  10 mg Take 2 of the 5 mg tablets.

## 2018-03-09 NOTE — PROGRESS NOTES
ANTICOAGULATION FOLLOW-UP CLINIC VISIT    Patient Name:  Jim Willingham  Date:  3/9/2018  Contact Type:  Telephone    SUBJECTIVE:     Patient Findings     Comments Encouraged an extra portion of greens.           OBJECTIVE    INR Point of Care   Date Value Ref Range Status   03/09/2018 3.8 (H) 0.86 - 1.14 Final     Comment:     This test is intended for monitoring Coumadin therapy.  Results are not   accurate in patients with prolonged INR due to factor deficiency.         ASSESSMENT / PLAN  No question data found.  Anticoagulation Summary as of 3/9/2018     INR goal 2.0-3.0   Today's INR 3.8!   Maintenance plan 7.5 mg (5 mg x 1.5) on Mon, Wed, Fri; 10 mg (5 mg x 2) all other days   Full instructions 3/9: 5 mg; Otherwise 7.5 mg on Mon, Wed, Fri; 10 mg all other days   Weekly total 62.5 mg   Plan last modified Maru Alonso RN (1/26/2018)   Next INR check 3/16/2018   Priority INR   Target end date     Indications   LVAD (left ventricular assist device) present (H) [Z95.811]  Long-term (current) use of anticoagulants [Z79.01] [Z79.01]         Anticoagulation Episode Summary     INR check location     Preferred lab     Send INR reminders to [facility]    Comments HIPPA Forms mailed 6/26/17  Labs drawn either at [facility] or [facility]      Anticoagulation Care Providers     Provider Role Specialty Phone number    Ulises Delisa Sprague MD Responsible Cardiology [phone]            See the Encounter Report to view Anticoagulation Flowsheet and Dosing Calendar (Go to Encounters tab in chart review, and find the Anticoagulation Therapy Visit)    Left message for patient with results and dosing recommendations. Asked patient to call back to report any missed doses, falls, signs and symptoms of bleeding or clotting, any changes in health, medication, or diet. Asked patient to call back with any questions or concerns.     Maru Alonso, REGINALD

## 2018-03-16 ENCOUNTER — ANTICOAGULATION THERAPY VISIT (OUTPATIENT)
Dept: ANTICOAGULATION | Facility: CLINIC | Age: 63
End: 2018-03-16

## 2018-03-16 DIAGNOSIS — Z95.811 LVAD (LEFT VENTRICULAR ASSIST DEVICE) PRESENT (H): ICD-10-CM

## 2018-03-16 DIAGNOSIS — Z79.01 LONG-TERM (CURRENT) USE OF ANTICOAGULANTS: ICD-10-CM

## 2018-03-16 DIAGNOSIS — Z95.811 LVAD (LEFT VENTRICULAR ASSIST DEVICE) PRESENT (H): Primary | ICD-10-CM

## 2018-03-16 DIAGNOSIS — I50.22 CHRONIC SYSTOLIC CONGESTIVE HEART FAILURE (H): ICD-10-CM

## 2018-03-16 LAB — INR BLD: 3.5 (ref 0.86–1.14)

## 2018-03-16 PROCEDURE — 36416 COLLJ CAPILLARY BLOOD SPEC: CPT | Performed by: INTERNAL MEDICINE

## 2018-03-16 PROCEDURE — 85610 PROTHROMBIN TIME: CPT | Mod: QW | Performed by: INTERNAL MEDICINE

## 2018-03-16 NOTE — MR AVS SNAPSHOT
Jim Willingham   3/16/2018   Anticoagulation Therapy Visit    Description:  62 year old male   Provider:  Radha Pedro, RN   Department:  [facility]           INR as of 3/16/2018     Today's INR 3.5!      Anticoagulation Summary as of 3/16/2018     INR goal 2.0-3.0   Today's INR 3.5!   Full instructions 3/16: 5 mg; 3/17: 7.5 mg; Otherwise 7.5 mg on Mon, Wed, Fri; 10 mg all other days   Next INR check 3/19/2018    Indications   LVAD (left ventricular assist device) present (H) [Z95.811]  Long-term (current) use of anticoagulants [Z79.01] [Z79.01]         March 2018 Details    Sun Mon Tue Wed Thu Fri Sat         1               2               3                 4               5               6               7               8               9               10                 11               12               13               14               15               16      5 mg   See details      17      7.5 mg           18      10 mg         19            20               21               22               23               24                 25               26               27               28               29               30               31                Date Details   03/16 This INR check       Date of next INR:  3/19/2018         How to take your warfarin dose     To take:  5 mg Take 1 of the 5 mg tablets.    To take:  7.5 mg Take 1.5 of the 5 mg tablets.    To take:  10 mg Take 2 of the 5 mg tablets.

## 2018-03-16 NOTE — PROGRESS NOTES
ANTICOAGULATION FOLLOW-UP CLINIC VISIT    Patient Name:  Jim Willingham  Date:  3/16/2018  Contact Type:  Telephone    SUBJECTIVE:     Patient Findings     Positives Herbal/Supplements    Comments Pt reports he forgot to tell us he was taking Tumeric and he didn't realize that this has an effect on the INR. He stopped taking this about a week ago, but thinks it has a lingering effect. Pt has a LVAD appointment on Monday with scheduled labs            OBJECTIVE    INR Point of Care   Date Value Ref Range Status   03/16/2018 3.5 (H) 0.86 - 1.14 Final     Comment:     This test is intended for monitoring Coumadin therapy.  Results are not   accurate in patients with prolonged INR due to factor deficiency.         ASSESSMENT / PLAN  INR assessment SUPRA    Recheck INR In: 3 DAYS    INR Location Clinic      Anticoagulation Summary as of 3/16/2018     INR goal 2.0-3.0   Today's INR 3.5!   Maintenance plan 7.5 mg (5 mg x 1.5) on Mon, Wed, Fri; 10 mg (5 mg x 2) all other days   Full instructions 3/16: 5 mg; 3/17: 7.5 mg; Otherwise 7.5 mg on Mon, Wed, Fri; 10 mg all other days   Weekly total 62.5 mg   Plan last modified Maru Alonso RN (1/26/2018)   Next INR check 3/19/2018   Priority INR   Target end date     Indications   LVAD (left ventricular assist device) present (H) [Z95.811]  Long-term (current) use of anticoagulants [Z79.01] [Z79.01]         Anticoagulation Episode Summary     INR check location     Preferred lab     Send INR reminders to [facility]    Comments HIPPA Forms mailed 6/26/17  Labs drawn either at [facility] or [facility]      Anticoagulation Care Providers     Provider Role Specialty Phone number    Delisa Montgomery MD Responsible Cardiology [phone]            See the Encounter Report to view Anticoagulation Flowsheet and Dosing Calendar (Go to Encounters tab in chart review, and find the Anticoagulation Therapy Visit)    Spoke with patient. Gave them their lab  results and new warfarin recommendation.  No changes in health, medication, or diet. No missed doses, no falls. No signs or symptoms of bleed or clotting.     Radha Pedro RN

## 2018-03-19 ENCOUNTER — ALLIED HEALTH/NURSE VISIT (OUTPATIENT)
Dept: CARDIOLOGY | Facility: CLINIC | Age: 63
End: 2018-03-19
Attending: INTERNAL MEDICINE
Payer: COMMERCIAL

## 2018-03-19 ENCOUNTER — ANTICOAGULATION THERAPY VISIT (OUTPATIENT)
Dept: ANTICOAGULATION | Facility: CLINIC | Age: 63
End: 2018-03-19

## 2018-03-19 ENCOUNTER — DOCUMENTATION ONLY (OUTPATIENT)
Dept: CARDIOLOGY | Facility: CLINIC | Age: 63
End: 2018-03-19

## 2018-03-19 ENCOUNTER — OFFICE VISIT (OUTPATIENT)
Dept: CARDIOLOGY | Facility: CLINIC | Age: 63
End: 2018-03-19
Attending: NURSE PRACTITIONER
Payer: COMMERCIAL

## 2018-03-19 VITALS
WEIGHT: 277.1 LBS | HEART RATE: 73 BPM | OXYGEN SATURATION: 95 % | BODY MASS INDEX: 42 KG/M2 | TEMPERATURE: 97.8 F | SYSTOLIC BLOOD PRESSURE: 80 MMHG | HEIGHT: 68 IN

## 2018-03-19 DIAGNOSIS — I50.22 CHRONIC SYSTOLIC CONGESTIVE HEART FAILURE (H): ICD-10-CM

## 2018-03-19 DIAGNOSIS — Z95.811 LVAD (LEFT VENTRICULAR ASSIST DEVICE) PRESENT (H): ICD-10-CM

## 2018-03-19 DIAGNOSIS — I48.0 PAROXYSMAL ATRIAL FIBRILLATION (H): ICD-10-CM

## 2018-03-19 DIAGNOSIS — M79.2 NERVE PAIN: ICD-10-CM

## 2018-03-19 DIAGNOSIS — N18.30 CKD (CHRONIC KIDNEY DISEASE) STAGE 3, GFR 30-59 ML/MIN (H): ICD-10-CM

## 2018-03-19 DIAGNOSIS — I50.23 ACUTE ON CHRONIC SYSTOLIC CONGESTIVE HEART FAILURE (H): Primary | ICD-10-CM

## 2018-03-19 DIAGNOSIS — I42.8 NICM (NONISCHEMIC CARDIOMYOPATHY) (H): Primary | ICD-10-CM

## 2018-03-19 DIAGNOSIS — Z79.01 LONG-TERM (CURRENT) USE OF ANTICOAGULANTS: ICD-10-CM

## 2018-03-19 LAB
ALBUMIN SERPL-MCNC: 3.8 G/DL (ref 3.4–5)
ALP SERPL-CCNC: 99 U/L (ref 40–150)
ALT SERPL W P-5'-P-CCNC: 42 U/L (ref 0–70)
ANION GAP SERPL CALCULATED.3IONS-SCNC: 8 MMOL/L (ref 3–14)
AST SERPL W P-5'-P-CCNC: 27 U/L (ref 0–45)
BILIRUB SERPL-MCNC: 0.8 MG/DL (ref 0.2–1.3)
BUN SERPL-MCNC: 40 MG/DL (ref 7–30)
CALCIUM SERPL-MCNC: 8.6 MG/DL (ref 8.5–10.1)
CHLORIDE SERPL-SCNC: 101 MMOL/L (ref 94–109)
CO2 SERPL-SCNC: 27 MMOL/L (ref 20–32)
CREAT SERPL-MCNC: 1.38 MG/DL (ref 0.66–1.25)
ERYTHROCYTE [DISTWIDTH] IN BLOOD BY AUTOMATED COUNT: 15.3 % (ref 10–15)
GFR SERPL CREATININE-BSD FRML MDRD: 52 ML/MIN/1.7M2
GLUCOSE SERPL-MCNC: 103 MG/DL (ref 70–99)
HCT VFR BLD AUTO: 35 % (ref 40–53)
HGB BLD-MCNC: 10.5 G/DL (ref 13.3–17.7)
INR PPP: 1.9 (ref 0.86–1.14)
LDH SERPL L TO P-CCNC: 338 U/L (ref 85–227)
MCH RBC QN AUTO: 25.9 PG (ref 26.5–33)
MCHC RBC AUTO-ENTMCNC: 30 G/DL (ref 31.5–36.5)
MCV RBC AUTO: 86 FL (ref 78–100)
PLATELET # BLD AUTO: 217 10E9/L (ref 150–450)
POTASSIUM SERPL-SCNC: 3.8 MMOL/L (ref 3.4–5.3)
PROT SERPL-MCNC: 6.8 G/DL (ref 6.8–8.8)
RBC # BLD AUTO: 4.06 10E12/L (ref 4.4–5.9)
SODIUM SERPL-SCNC: 136 MMOL/L (ref 133–144)
WBC # BLD AUTO: 8.6 10E9/L (ref 4–11)

## 2018-03-19 PROCEDURE — 93284 PRGRMG EVAL IMPLANTABLE DFB: CPT | Mod: 26 | Performed by: INTERNAL MEDICINE

## 2018-03-19 PROCEDURE — 80053 COMPREHEN METABOLIC PANEL: CPT | Performed by: NURSE PRACTITIONER

## 2018-03-19 PROCEDURE — 85610 PROTHROMBIN TIME: CPT | Performed by: NURSE PRACTITIONER

## 2018-03-19 PROCEDURE — G0463 HOSPITAL OUTPT CLINIC VISIT: HCPCS | Mod: 25,ZF

## 2018-03-19 PROCEDURE — 85027 COMPLETE CBC AUTOMATED: CPT | Performed by: NURSE PRACTITIONER

## 2018-03-19 PROCEDURE — 99214 OFFICE O/P EST MOD 30 MIN: CPT | Mod: 25 | Performed by: NURSE PRACTITIONER

## 2018-03-19 PROCEDURE — 36415 COLL VENOUS BLD VENIPUNCTURE: CPT | Performed by: NURSE PRACTITIONER

## 2018-03-19 PROCEDURE — 83615 LACTATE (LD) (LDH) ENZYME: CPT | Performed by: NURSE PRACTITIONER

## 2018-03-19 PROCEDURE — 93750 INTERROGATION VAD IN PERSON: CPT | Mod: ZF | Performed by: NURSE PRACTITIONER

## 2018-03-19 PROCEDURE — 93284 PRGRMG EVAL IMPLANTABLE DFB: CPT | Mod: ZF

## 2018-03-19 RX ORDER — BUMETANIDE 1 MG/1
2 TABLET ORAL DAILY
Qty: 30 TABLET | Status: CANCELLED | OUTPATIENT
Start: 2018-03-19

## 2018-03-19 RX ORDER — BUMETANIDE 1 MG/1
TABLET ORAL
Qty: 180 TABLET | Refills: 3 | Status: SHIPPED | OUTPATIENT
Start: 2018-03-19 | End: 2018-04-20

## 2018-03-19 RX ORDER — GABAPENTIN 300 MG/1
300 CAPSULE ORAL 2 TIMES DAILY
Qty: 90 CAPSULE | COMMUNITY
Start: 2018-03-19 | End: 2018-04-11

## 2018-03-19 RX ORDER — BUMETANIDE 1 MG/1
2 TABLET ORAL DAILY
Qty: 180 TABLET | Refills: 3 | Status: SHIPPED | OUTPATIENT
Start: 2018-03-19 | End: 2018-03-19

## 2018-03-19 ASSESSMENT — PAIN SCALES - GENERAL: PAINLEVEL: NO PAIN (0)

## 2018-03-19 NOTE — LETTER
3/19/2018      RE: Jim Willingham  [address] [place] [id number]       Dear Colleague,    Thank you for the opportunity to participate in the care of your patient, Jim Willingham, at the [facility]. Please see a copy of my visit note below.    HPI: Jim Willingham is a 62-year-old gentleman with a past medical history of chronic kidney disease stage III, diabetes mellitus type 2, CECILIA, obesity, hypertension, COPD, asthma, and nonischemic cardiomyopathy (EF 20%), status post HeartMate 2 LVAD placement on June 16, 2017, complicated by arrhythmias, WALTER, and left small pleural effusion, who returns for routine follow-up.    He was last seen by Dr. Montgomery in December.  At that time he was hypervolemic, and she increased his Bumex to 2 mg alternating with 1 mg.  Since he has had frequent antibiotics and steroid tapers, she also recommended that he follow up with a pulmonologist for an opinion regarding his lung status for potential candidacy for heart transplant.     Since December, he has gained 20 lbs. He admits that he has been eating more fast food and probably going over his recommended sodium intake.  He had Chinese over the weekend. He feels more SOB.  Complains of RESENDIZ after walking 100 feet, carrying in groceries, or going up stairs.  Has to stop to catch his breath and rest for a minute or two and his symptoms resolve.  +Lower extremity edema, more abdominal distension.  Denies SOB at rest, PND, orthopnea, chest pain, palpitations, dizziness, near syncopal/syncopal episodes.  Is wearing CPAP nightly.      He LVAD parameters have been stable.  Denies HA, neurological changes, hematuria, hematochezia, melena, epistaxis, fever, chills or any concerns for driveline infection. Pulmonary consult scheduled for March 27.      PAST MEDICAL HISTORY:  Past Medical History:   Diagnosis Date     Benign essential hypertension 5/11/2017     Cardiomyopathy,  unspecified 5/8/2017     CKD (chronic kidney disease) stage 3, GFR 30-59 ml/min 5/11/2017     Depression 5/11/2017     Diabetes mellitus (H) 5/11/2017     H/O gastric bypass 5/11/2017     ICD (implantable cardioverter-defibrillator), biventricular, in situ 5/11/2017     NICM (nonischemic cardiomyopathy) (H)/ EF 20% 5/11/2017    ECHO: LVEDd. 7.66 cm, Restrictive pattern , Severe mitral valve regurgitation     CECILIA (obstructive sleep apnea) 5/11/2017     Paroxysmal atrial fibrillation (H) 5/11/2017     Paroxysmal VT (H) 5/11/2017     Vitamin B12 deficiency (non anemic) 5/11/2017       FAMILY HISTORY:  Family History   Problem Relation Age of Onset     CEREBROVASCULAR DISEASE Mother      DIABETES Mother      Hypertension Mother      Coronary Artery Disease Father      Type 2 Diabetes Father        SOCIAL HISTORY:  Social History     Social History     Marital status:      Spouse name: N/A     Number of children: N/A     Years of education: N/A     Social History Main Topics     Smoking status: Former Smoker     Quit date: 1995     Smokeless tobacco: Not on file     Alcohol use No     Drug use: Not on file     Sexual activity: Yes     Partners: Female     Other Topics Concern     Parent/Sibling W/ Cabg, Mi Or Angioplasty Before 65f 55m? No     Social History Narrative       CURRENT MEDICATIONS:  Current Outpatient Prescriptions   Medication Sig Dispense Refill     losartan (COZAAR) 25 MG tablet Take 1 tablet (25 mg) by mouth daily 90 tablet 3     enoxaparin (LOVENOX) 120 MG/0.8ML injection Please inject entire contents of Syringe, 120mg of Lovenox subcutaneously every twelve hours daily. 10 Syringe 1     allopurinol (ZYLOPRIM) 100 MG tablet Take 1 tablet (100 mg) by mouth daily 30 tablet 3     warfarin (COUMADIN) 5 MG tablet Take 2 tablets (10 mg) by mouth daily 60 tablet 5     pantoprazole (PROTONIX) 40 MG EC tablet Take 1 tablet (40 mg) by mouth every morning 30 tablet 5     bumetanide (BUMEX) 1 MG tablet Take 2  mg by mouth daily Alternate 1mg daily with 2mg daily, every other day.  30 tablet      spironolactone (ALDACTONE) 25 MG tablet Take 1 tablet (25 mg) by mouth daily 30 tablet 5     COLCHICINE PO Take by mouth daily       predniSONE (DELTASONE) 20 MG tablet Take 1 tablet (20 mg) by mouth daily 20 tablet 0     metoprolol (TOPROL-XL) 25 MG 24 hr tablet Take 1 tablet (25 mg) by mouth 2 times daily 180 tablet 3     aspirin 81 MG chewable tablet 1 tablet (81 mg) by Oral or Feeding Tube route daily 36 tablet      metFORMIN (GLUCOPHAGE) 500 MG tablet Take 0.5 tablets (250 mg) by mouth 2 times daily (with meals) (Patient not taking: Reported on 12/19/2017) 60 tablet 0     senna-docusate (SENOKOT-S;PERICOLACE) 8.6-50 MG per tablet Take 2 tablets by mouth 2 times daily as needed for constipation 100 tablet 0     traZODone (DESYREL) 50 MG tablet Take 50 mg by mouth nightly x one week then reduce to 25 mg (1/2 tab) by mouth nightly x one week then stop. (Patient not taking: Reported on 8/11/2017) 11 tablet 0     warfarin (COUMADIN) 3 MG tablet Use as directed. 30 tablet 0     HYDROmorphone (DILAUDID) 2 MG tablet Take 1 tablet (2 mg) by mouth every 4 hours as needed for moderate to severe pain (Patient not taking: Reported on 7/25/2017) 45 tablet 0     traMADol (ULTRAM) 50 MG tablet Take 2 tablets (100 mg) by mouth every 6 hours as needed for moderate pain or breakthrough pain 28 tablet      acetaminophen (TYLENOL) 325 MG tablet Take 2 tablets (650 mg) by mouth every 6 hours 100 tablet      polyethylene glycol (MIRALAX/GLYCOLAX) Packet Take 17 g by mouth daily as needed for constipation (for no bm in 1 day) 7 packet      albuterol (ALBUTEROL) 108 (90 BASE) MCG/ACT Inhaler Inhale 2 puffs into the lungs every 4 hours as needed for shortness of breath / dyspnea or wheezing       ipratropium - albuterol 0.5 mg/2.5 mg/3 mL (DUONEB) 0.5-2.5 (3) MG/3ML neb solution Take 3 mLs by nebulization every 4 hours as needed for shortness of  "breath / dyspnea or wheezing       citalopram (CELEXA) 20 MG tablet Take 20 mg by mouth daily       cyanocobalamin (VITAMIN B12) 1000 MCG/ML injection Inject 1,000 mcg into the muscle every 30 days       fluticasone-vilanterol (BREO ELLIPTA) 200-25 MCG/INH oral inhaler Inhale 1 puff into the lungs daily       montelukast (SINGULAIR) 10 MG tablet Take 10 mg by mouth At Bedtime       umeclidinium (INCRUSE ELLIPTA) 62.5 MCG/INH oral inhaler Inhale 1 puff into the lungs daily       zinc sulfate (ZINCATE) 220 (50 ZN) MG capsule Take 220 mg by mouth 2 times daily         ROS:  Constitutional: Denies fever, chills, or diaphoresis.  +Weight gain  ENT: Denies visual disturbance, hearing loss, epistaxis, vertigo,   Respiratory:  See HPI.     Cardiovascular: As per HPI.   GI: Denies nausea, vomiting, diarrhea, hematemesis, melena, or hematochezia.   : Denies urinary frequency, dysuria, or hematuria.   Integument: Negative for bruising, rash, or pruritis.  Psychiatric: Negative for anxiety, depression, sleep disturbance, or mood changes.   Neuro: Negative for headaches, syncope, numbness or tingling.   Endocrinology: +DM.  Blood sugars uncontrolled.   Negative for thyroid disorder or night sweats.     Musculoskeletal: Negative for gout, joint stiffness, swelling, or muscle weakness    EXAM:  BP (!) 80/0 (BP Location: Left arm, Patient Position: Chair, Cuff Size: Adult Large)  Pulse 73  Temp 97.8  F (36.6  C)  Ht 1.727 m (5' 8\")  Wt 125.7 kg (277 lb 1.6 oz)  SpO2 95%  BMI 42.13 kg/m2  General: alert, articulate, and in no acute distress.  HEENT: normocephalic, atraumatic, anicteric sclera, EOMI, normal palate, mucosa moist, dentition intact, no cyanosis.    Neck: neck supple.  No adenopathy, masses, or carotid bruits.  JVP lower 1/3 of neck when sitting upright  Heart: LVAD hum present, normal S1/S2, no murmur, gallop, rub.   Lungs: clear, no rales, ronchi, or wheezing.  No accessory muscle use, respirations unlabored. "   Abdomen: soft, obese, non-tender, bowel sounds present, no hepatomegaly  Extremities: Trace edema.  No palpable pedal pulses.  Neurological: alert and oriented x 3.  normal speech and affect, no gross motor deficits  Skin:  No ecchymoses, rashes, or clubbing.       Labs:  CBC RESULTS:  Lab Results   Component Value Date    WBC 8.6 03/19/2018    RBC 4.06 (L) 03/19/2018    HGB 10.5 (L) 03/19/2018    HCT 35.0 (L) 03/19/2018    MCV 86 03/19/2018    MCH 25.9 (L) 03/19/2018    MCHC 30.0 (L) 03/19/2018    RDW 15.3 (H) 03/19/2018     03/19/2018       CMP RESULTS:  Lab Results   Component Value Date     02/09/2018    POTASSIUM 4.2 02/09/2018    CHLORIDE 106 02/09/2018    CO2 30 02/09/2018    ANIONGAP 4 02/09/2018     (H) 02/09/2018    BUN 31 (H) 02/09/2018    CR 1.29 (H) 02/09/2018    GFRESTIMATED 57 (L) 02/09/2018    GFRESTBLACK 69 02/09/2018    QAMAR 8.3 (L) 02/09/2018    BILITOTAL 0.8 12/19/2017    ALBUMIN 4.0 12/19/2017    ALKPHOS 103 12/19/2017    ALT 45 12/19/2017    AST 26 12/19/2017        INR RESULTS:  Lab Results   Component Value Date    INR 3.5 (H) 03/16/2018    INR 2.18 (H) 02/09/2018       No components found for: CK  Lab Results   Component Value Date    MAG 2.1 06/30/2017     Lab Results   Component Value Date    NTBNP 1119 (H) 12/19/2017       Most recent echocardiogram:  No results found for this or any previous visit (from the past 4320 hour(s)).    Assessment and Plan:    Jim is a 62 year old gentleman s/p LVAD HM II placement for NICM.  He appears to be hypervolemic today secondary to dietary indiscretion as his wife recently had knee surgery and is in rehab.  Since he has been resuming more of the day to day responsibilities while she is recovering, he has been eating out more over the past month.      I recommended that he increase his Bumex to 2 mg daily for the next week, as well as increase his Aldactone to 50 mg daily for the next week. He will return to his previous doses  after that.  Will recheck a BMP in one week to assess his response to the diuretic adjustments and phone check to reassess his symptoms.     1.  Chronic systolic heart failure secondary to NICM.  Stage D  NYHA Class III  ACEi/ARB: yes, Losartan 25 mg daily.   BB: yes Toprol XL 25 mg twice daily.   Aldosterone antagonist: yes, increase spironolactone SCD prophylaxis: CRT-D.  Device check performed today.  See report.   Fluid status: hypervolemic      2.  S/P LVAD implant as DT secondary to obesity.     Anticoagulation: INR goal 2-3.   Antiplatelet:  ASA 81 mg daily  MAP:  Controlled at 80.   LDH:  Stable at 338.  VAD Interrogation today: VAD interrogation reviewed with VAD coordinator. Agree with findings. Several PI events with no power spikes, speed drops, or other findings suspicious of pump malfunction noted. Several low voltage advisory alarms.    3.  Obesity:  BMI today 42.13.  Recommended that he follow up with dietician as recommended to work on weight loss.  I reviewed that while he is retaining some fluid, the majority of his weight gain is from eating excess calories.      4. PAF:  Chads Vasc score: 4.  Continue Warfarin and Toprol XL.      5.  CKD Stage III:  Creatinine today is 1.38 today.  (1.29 in February).  Suspect cardiorenal.  Diurese as outlined above.      6.  Follow-up BMP in one week.   Follow up in LVAD clinic in one month.    Cate DUMONT, CNP  Advanced Heart Failure/LVAD clinic  [phone]

## 2018-03-19 NOTE — PATIENT INSTRUCTIONS
Medications:  1. Increase your Bumex to 2 mg daily for the next week, (through Sunday)  then go back to 2 mg alternating 1 mg every other day.    2. Increase your aldactone to 50 mg daily for 1 week, then decrease back to 25 mg daily.    Follow-up:  1. Please see a heart failure NP in 1 month.  2. Please get a BMP lab draw in 1 week.        Page the VAD Coordinator on call if you gain more than 3 lb in a day or 5 in a week. Please also page if you feel unwell or have alarms.     Great to see you in clinic today. To Page the VAD Coordinator on call, dial [phone] option #[phone] and ask to speak to the VAD coordinator on call.

## 2018-03-19 NOTE — PROGRESS NOTES
Preliminary Device Interrogation Results.  Final physician signed paceart report to be scanned and attached.    Patient seen in clinic for evaluation and iterative programming of his Medtronic multi lead ICD per MD orders.  Patient has an appointment to see Cate Marshall  today.  Normal ICD function.  34 episodes recorded  NSVT, 168 -200 bpm lasting  < 1 sec. - 7 sec.   7 V sensed episodes, 113 - 122 bpm  durations of 10 sec. - 3 min 46 sec.  Intrinsic rhythm = AS irregular VS @ 78 bpm.  AP = 0.9%.   = 86.4%.  BVP =91%.  OptiVol fluid index is slightly above baseline.  Estimated battery longevity to MARVA = 4.9 years.  No short v-v intervals recorded.  RV lead impedance trends appear stable.  Patient reports that he is feeling well and denies complaints.  Plan for patient to return to clinic in 6 months.    Multi lead ICD

## 2018-03-19 NOTE — PROGRESS NOTES
ANTICOAGULATION FOLLOW-UP CLINIC VISIT    Patient Name:  Jim Willingham  Date:  3/19/2018  Contact Type:  Telephone    SUBJECTIVE:     Patient Findings     Comments Increase aspirin from 81mg to 325mg until INR is therapeutic again.           OBJECTIVE    INR   Date Value Ref Range Status   03/19/2018 1.90 (H) 0.86 - 1.14 Final       ASSESSMENT / PLAN  INR assessment SUB    Recheck INR In: 4 DAYS    INR Location Clinic      Anticoagulation Summary as of 3/19/2018     INR goal 2.0-3.0   Today's INR    Maintenance plan 7.5 mg (5 mg x 1.5) on Mon, Wed, Fri; 10 mg (5 mg x 2) all other days   Full instructions 3/19: 10 mg; 3/20: 7.5 mg; 3/21: 10 mg; 3/22: 7.5 mg; Otherwise 7.5 mg on Mon, Wed, Fri; 10 mg all other days   Weekly total 62.5 mg   Plan last modified Maru Alonso RN (1/26/2018)   Next INR check 3/23/2018   Priority INR   Target end date     Indications   LVAD (left ventricular assist device) present (H) [Z95.811]  Long-term (current) use of anticoagulants [Z79.01] [Z79.01]         Anticoagulation Episode Summary     INR check location     Preferred lab     Send INR reminders to [facility]    Comments HIPPA Forms mailed 6/26/17  Labs drawn either at [facility] or [facility]      Anticoagulation Care Providers     Provider Role Specialty Phone number    Delisa Montgomery MD Responsible Cardiology [phone]            See the Encounter Report to view Anticoagulation Flowsheet and Dosing Calendar (Go to Encounters tab in chart review, and find the Anticoagulation Therapy Visit)    Left message for patient with results and dosing recommendations. Asked patient to call back to report any missed doses, falls, signs and symptoms of bleeding or clotting, any changes in health, medication, or diet. Asked patient to call back with any questions or concerns.      Delisa Hillman RN

## 2018-03-19 NOTE — PROGRESS NOTES
HPI: Jim Willingham is a 62-year-old gentleman with a past medical history of chronic kidney disease stage III, diabetes mellitus type 2, CECILIA, obesity, hypertension, COPD, asthma, and nonischemic cardiomyopathy (EF 20%), status post HeartMate 2 LVAD placement on June 16, 2017, complicated by arrhythmias, WALTER, and left small pleural effusion, who returns for routine follow-up.    He was last seen by Dr. Mongtomery in December.  At that time he was hypervolemic, and she increased his Bumex to 2 mg alternating with 1 mg.  Since he has had frequent antibiotics and steroid tapers, she also recommended that he follow up with a pulmonologist for an opinion regarding his lung status for potential candidacy for heart transplant.     Since December, he has gained 20 lbs. He admits that he has been eating more fast food and probably going over his recommended sodium intake.  He had Chinese over the weekend. He feels more SOB.  Complains of RESENDIZ after walking 100 feet, carrying in groceries, or going up stairs.  Has to stop to catch his breath and rest for a minute or two and his symptoms resolve.  +Lower extremity edema, more abdominal distension.  Denies SOB at rest, PND, orthopnea, chest pain, palpitations, dizziness, near syncopal/syncopal episodes.  Is wearing CPAP nightly.      He LVAD parameters have been stable.  Denies HA, neurological changes, hematuria, hematochezia, melena, epistaxis, fever, chills or any concerns for driveline infection. Pulmonary consult scheduled for March 27.      PAST MEDICAL HISTORY:  Past Medical History:   Diagnosis Date     Benign essential hypertension 5/11/2017     Cardiomyopathy, unspecified 5/8/2017     CKD (chronic kidney disease) stage 3, GFR 30-59 ml/min 5/11/2017     Depression 5/11/2017     Diabetes mellitus (H) 5/11/2017     H/O gastric bypass 5/11/2017     ICD (implantable cardioverter-defibrillator), biventricular, in situ 5/11/2017     NICM (nonischemic cardiomyopathy) (H)/ EF 20%  5/11/2017    ECHO: LVEDd. 7.66 cm, Restrictive pattern , Severe mitral valve regurgitation     CECILIA (obstructive sleep apnea) 5/11/2017     Paroxysmal atrial fibrillation (H) 5/11/2017     Paroxysmal VT (H) 5/11/2017     Vitamin B12 deficiency (non anemic) 5/11/2017       FAMILY HISTORY:  Family History   Problem Relation Age of Onset     CEREBROVASCULAR DISEASE Mother      DIABETES Mother      Hypertension Mother      Coronary Artery Disease Father      Type 2 Diabetes Father        SOCIAL HISTORY:  Social History     Social History     Marital status:      Spouse name: N/A     Number of children: N/A     Years of education: N/A     Social History Main Topics     Smoking status: Former Smoker     Quit date: 1995     Smokeless tobacco: Not on file     Alcohol use No     Drug use: Not on file     Sexual activity: Yes     Partners: Female     Other Topics Concern     Parent/Sibling W/ Cabg, Mi Or Angioplasty Before 65f 55m? No     Social History Narrative       CURRENT MEDICATIONS:  Current Outpatient Prescriptions   Medication Sig Dispense Refill     losartan (COZAAR) 25 MG tablet Take 1 tablet (25 mg) by mouth daily 90 tablet 3     enoxaparin (LOVENOX) 120 MG/0.8ML injection Please inject entire contents of Syringe, 120mg of Lovenox subcutaneously every twelve hours daily. 10 Syringe 1     allopurinol (ZYLOPRIM) 100 MG tablet Take 1 tablet (100 mg) by mouth daily 30 tablet 3     warfarin (COUMADIN) 5 MG tablet Take 2 tablets (10 mg) by mouth daily 60 tablet 5     pantoprazole (PROTONIX) 40 MG EC tablet Take 1 tablet (40 mg) by mouth every morning 30 tablet 5     bumetanide (BUMEX) 1 MG tablet Take 2 mg by mouth daily Alternate 1mg daily with 2mg daily, every other day.  30 tablet      spironolactone (ALDACTONE) 25 MG tablet Take 1 tablet (25 mg) by mouth daily 30 tablet 5     COLCHICINE PO Take by mouth daily       predniSONE (DELTASONE) 20 MG tablet Take 1 tablet (20 mg) by mouth daily 20 tablet 0      metoprolol (TOPROL-XL) 25 MG 24 hr tablet Take 1 tablet (25 mg) by mouth 2 times daily 180 tablet 3     aspirin 81 MG chewable tablet 1 tablet (81 mg) by Oral or Feeding Tube route daily 36 tablet      metFORMIN (GLUCOPHAGE) 500 MG tablet Take 0.5 tablets (250 mg) by mouth 2 times daily (with meals) (Patient not taking: Reported on 12/19/2017) 60 tablet 0     senna-docusate (SENOKOT-S;PERICOLACE) 8.6-50 MG per tablet Take 2 tablets by mouth 2 times daily as needed for constipation 100 tablet 0     traZODone (DESYREL) 50 MG tablet Take 50 mg by mouth nightly x one week then reduce to 25 mg (1/2 tab) by mouth nightly x one week then stop. (Patient not taking: Reported on 8/11/2017) 11 tablet 0     warfarin (COUMADIN) 3 MG tablet Use as directed. 30 tablet 0     HYDROmorphone (DILAUDID) 2 MG tablet Take 1 tablet (2 mg) by mouth every 4 hours as needed for moderate to severe pain (Patient not taking: Reported on 7/25/2017) 45 tablet 0     traMADol (ULTRAM) 50 MG tablet Take 2 tablets (100 mg) by mouth every 6 hours as needed for moderate pain or breakthrough pain 28 tablet      acetaminophen (TYLENOL) 325 MG tablet Take 2 tablets (650 mg) by mouth every 6 hours 100 tablet      polyethylene glycol (MIRALAX/GLYCOLAX) Packet Take 17 g by mouth daily as needed for constipation (for no bm in 1 day) 7 packet      albuterol (ALBUTEROL) 108 (90 BASE) MCG/ACT Inhaler Inhale 2 puffs into the lungs every 4 hours as needed for shortness of breath / dyspnea or wheezing       ipratropium - albuterol 0.5 mg/2.5 mg/3 mL (DUONEB) 0.5-2.5 (3) MG/3ML neb solution Take 3 mLs by nebulization every 4 hours as needed for shortness of breath / dyspnea or wheezing       citalopram (CELEXA) 20 MG tablet Take 20 mg by mouth daily       cyanocobalamin (VITAMIN B12) 1000 MCG/ML injection Inject 1,000 mcg into the muscle every 30 days       fluticasone-vilanterol (BREO ELLIPTA) 200-25 MCG/INH oral inhaler Inhale 1 puff into the lungs daily        "montelukast (SINGULAIR) 10 MG tablet Take 10 mg by mouth At Bedtime       umeclidinium (INCRUSE ELLIPTA) 62.5 MCG/INH oral inhaler Inhale 1 puff into the lungs daily       zinc sulfate (ZINCATE) 220 (50 ZN) MG capsule Take 220 mg by mouth 2 times daily         ROS:  Constitutional: Denies fever, chills, or diaphoresis.  +Weight gain  ENT: Denies visual disturbance, hearing loss, epistaxis, vertigo,   Respiratory:  See HPI.     Cardiovascular: As per HPI.   GI: Denies nausea, vomiting, diarrhea, hematemesis, melena, or hematochezia.   : Denies urinary frequency, dysuria, or hematuria.   Integument: Negative for bruising, rash, or pruritis.  Psychiatric: Negative for anxiety, depression, sleep disturbance, or mood changes.   Neuro: Negative for headaches, syncope, numbness or tingling.   Endocrinology: +DM.  Blood sugars uncontrolled.   Negative for thyroid disorder or night sweats.     Musculoskeletal: Negative for gout, joint stiffness, swelling, or muscle weakness    EXAM:  BP (!) 80/0 (BP Location: Left arm, Patient Position: Chair, Cuff Size: Adult Large)  Pulse 73  Temp 97.8  F (36.6  C)  Ht 1.727 m (5' 8\")  Wt 125.7 kg (277 lb 1.6 oz)  SpO2 95%  BMI 42.13 kg/m2  General: alert, articulate, and in no acute distress.  HEENT: normocephalic, atraumatic, anicteric sclera, EOMI, normal palate, mucosa moist, dentition intact, no cyanosis.    Neck: neck supple.  No adenopathy, masses, or carotid bruits.  JVP lower 1/3 of neck when sitting upright  Heart: LVAD hum present, normal S1/S2, no murmur, gallop, rub.   Lungs: clear, no rales, ronchi, or wheezing.  No accessory muscle use, respirations unlabored.   Abdomen: soft, obese, non-tender, bowel sounds present, no hepatomegaly  Extremities: Trace edema.  No palpable pedal pulses.  Neurological: alert and oriented x 3.  normal speech and affect, no gross motor deficits  Skin:  No ecchymoses, rashes, or clubbing.       Labs:  CBC RESULTS:  Lab Results   Component " Value Date    WBC 8.6 03/19/2018    RBC 4.06 (L) 03/19/2018    HGB 10.5 (L) 03/19/2018    HCT 35.0 (L) 03/19/2018    MCV 86 03/19/2018    MCH 25.9 (L) 03/19/2018    MCHC 30.0 (L) 03/19/2018    RDW 15.3 (H) 03/19/2018     03/19/2018       CMP RESULTS:  Lab Results   Component Value Date     02/09/2018    POTASSIUM 4.2 02/09/2018    CHLORIDE 106 02/09/2018    CO2 30 02/09/2018    ANIONGAP 4 02/09/2018     (H) 02/09/2018    BUN 31 (H) 02/09/2018    CR 1.29 (H) 02/09/2018    GFRESTIMATED 57 (L) 02/09/2018    GFRESTBLACK 69 02/09/2018    QAMAR 8.3 (L) 02/09/2018    BILITOTAL 0.8 12/19/2017    ALBUMIN 4.0 12/19/2017    ALKPHOS 103 12/19/2017    ALT 45 12/19/2017    AST 26 12/19/2017        INR RESULTS:  Lab Results   Component Value Date    INR 3.5 (H) 03/16/2018    INR 2.18 (H) 02/09/2018       No components found for: CK  Lab Results   Component Value Date    MAG 2.1 06/30/2017     Lab Results   Component Value Date    NTBNP 1119 (H) 12/19/2017       Most recent echocardiogram:  No results found for this or any previous visit (from the past 4320 hour(s)).    Assessment and Plan:    Jim is a 62 year old gentleman s/p LVAD HM II placement for NICM.  He appears to be hypervolemic today secondary to dietary indiscretion as his wife recently had knee surgery and is in rehab.  Since he has been resuming more of the day to day responsibilities while she is recovering, he has been eating out more over the past month.      I recommended that he increase his Bumex to 2 mg daily for the next week, as well as increase his Aldactone to 50 mg daily for the next week. He will return to his previous doses after that.  Will recheck a BMP in one week to assess his response to the diuretic adjustments and phone check to reassess his symptoms.     1.  Chronic systolic heart failure secondary to NICM.  Stage D  NYHA Class III  ACEi/ARB: yes, Losartan 25 mg daily.   BB: yes Toprol XL 25 mg twice daily.   Aldosterone  antagonist: yes, increase spironolactone SCD prophylaxis: CRT-D.  Device check performed today.  See report.   Fluid status: hypervolemic      2.  S/P LVAD implant as DT secondary to obesity.     Anticoagulation: INR goal 2-3.   Antiplatelet:  ASA 81 mg daily  MAP:  Controlled at 80.   LDH:  Stable at 338.  VAD Interrogation today: VAD interrogation reviewed with VAD coordinator. Agree with findings. Several PI events with no power spikes, speed drops, or other findings suspicious of pump malfunction noted. Several low voltage advisory alarms.    3.  Obesity:  BMI today 42.13.  Recommended that he follow up with dietician as recommended to work on weight loss.  I reviewed that while he is retaining some fluid, the majority of his weight gain is from eating excess calories.      4. PAF:  Chads Vasc score: 4.  Continue Warfarin and Toprol XL.      5.  CKD Stage III:  Creatinine today is 1.38 today.  (1.29 in February).  Suspect cardiorenal.  Diurese as outlined above.      6.  Follow-up BMP in one week.   Follow up in LVAD clinic in one month.    Cate DUMONT, CNP  Advanced Heart Failure/LVAD clinic  [phone]

## 2018-03-19 NOTE — NURSING NOTE
1). PUMP DATA  Primary controller serial number: [id number]    HM II:   Flow: 6.2 L/min,    Speed: 9600 RPMs,     PI: 5.5 ,  Power: 6.5 Manuel,      Primary controller   Back up battery: Patient use: 51, Replace in: 21  Months     Data downloaded: No   Equipment and driveline assessed for damage: Yes     Back up : Serial number: [id number]  Back up battery: Patient use: 4 Replace in: 23  Months  Programmed settings identical to the settings on the primary controller :Yes      Education complete: Yes   Charge the BACKUP controller s backup battery every 6 months  Perform a self test on BACKUP every 6 months  Change the MPU s batteries every 6 months:Yes  Have equipment serviced yearly (if applicable):Yes      2). ALARMS  Alarms reported by patient since last pump evaluation: Yes  Alarms or other finding noted during pump data history and alarm download: Yes, frequent PI events no speed drops. I explained no external power alarm.     Action Taken:  Reviewed data with patient: Yes      3). DRESSING CHANGE / DRIVELINE ASSESSMENT  Dressing change completed today: No  Appearance of Driveline site: CDI, no redness no drainage and no tenderness.     Driveline stabilization: Method: [employer]  [ Teaching reinforced on need for stabilization of Driveline. ]

## 2018-03-19 NOTE — NURSING NOTE
Chief Complaint   Patient presents with     Follow Up For     VAD FU 3 month return     Vitals were taken and medications were reconciled.  Cristhian Gipson, GOKUL  10:57 AM

## 2018-03-19 NOTE — MR AVS SNAPSHOT
After Visit Summary   3/19/2018    Jim Willingham    MRN: [id number]           Patient Information     Date Of Birth          1955        Visit Information        Provider Department      3/19/2018 11:00 AM Cate Marshall, NP [facility]        Today's Diagnoses     Chronic systolic congestive heart failure (H)    -  1    LVAD (left ventricular assist device) present (H)        Nerve pain          Care Instructions    Medications:  1. Increase your Bumex to 2 mg daily.     Follow-up:  1. Please see a heart failure NP in 1 month.  2. Please get a BMP lab draw in 1 week.        Page the VAD Coordinator on call if you gain more than 3 lb in a day or 5 in a week. Please also page if you feel unwell or have alarms.     Great to see you in clinic today. To Page the VAD Coordinator on call, dial [phone] option #[phone] and ask to speak to the VAD coordinator on call.             Follow-ups after your visit        Follow-up notes from your care team     Return in about 1 month (around 4/19/2018) for LVAD follow .      Your next 10 appointments already scheduled     Mar 27, 2018  6:00 AM CDT   FULL PULMONARY FUNCTION with  PFL B   [facility] Pulmonary Function Testing ([facility])    [address] [address] [phone]            Mar 27, 2018  6:50 AM CDT   (Arrive by 6:35 AM)   XR CHEST 2 VIEWS with UCXR1   [facility] Xray ([facility])    [address] [place] [address] [phone]           Please bring a list of your current medicines to your exam. (Include vitamins, minerals and over-thecounter medicines.) Leave your valuables at home.  Tell your doctor if there is a chance you may be pregnant.  You do not need to do anything special for this exam.            Mar 27, 2018  7:00 AM CDT   (Arrive by 6:45 AM)   New Patient Visit with Elfego Hayden MD   [facility] ([facility])    [address] [facility]  [address]  [facility] [address]   [phone]            Apr 16, 2018 10:30 AM CDT   Lab with UC LAB   [facility] ([facility])    [address] [place] [address]  1st Floor  [facility] [address]   [phone]            Apr 16, 2018 11:00 AM CDT   (Arrive by 10:45 AM)   Ventricular Assist Device with Cate Marshall NP   [facility] ([facility])    [address] [place] [address]  [address] [place] [address]   [phone]            Jul 06, 2018  9:00 AM CDT   LAB with UU LAB GOLD WAITING   [facility] Lab ([facility])    [address] [place] [id number]              Please do not eat 10-12 hours before your appointment if you are coming in fasting for labs on lipids, cholesterol, or glucose (sugar). This does not apply to pregnant women. Water, hot tea and black coffee (with nothing added) are okay. Do not drink other fluids, diet soda or chew gum.            Jul 06, 2018  9:30 AM CDT   Ech Complete Lvad* with UUECHR2   [facility] [place],  Echocardiography ([facility])    [address] [facility] [address] [phone]           1.  Please bring or wear a comfortable two-piece outfit. 2.  You may eat, drink and take your normal medicines. 3.  For any questions that cannot be answered, please contact the ordering physician            Jul 06, 2018  2:00 PM CDT   (Arrive by 1:45 PM)   Implanted Defibulator with Uc Cv Device 1   [facility] ([facility])    [address] [place] [address]  [address] [place] [address]   [phone]            Jul 06, 2018  2:30 PM CDT   (Arrive by 2:15 PM)   Ventricular Assist Device with Delisa Montgomery MD   [facility] ([facility])    [address] [place] [address]  "  [address]  [facility] [address]   [phone]              Future tests that were ordered for you today     Open Future Orders        Priority Expected Expires Ordered    Basic metabolic panel Routine 3/30/2018 3/19/2019 3/19/2018            Who to contact     If you have questions or need follow up information about today's clinic visit or your schedule please contact [facility] directly at [phone].  Normal or non-critical lab and imaging results will be communicated to you by [employer], letter or phone within 4 business days after the clinic has received the results. If you do not hear from us within 7 days, please contact the clinic through [employer] or phone. If you have a critical or abnormal lab result, we will notify you by phone as soon as possible.  Submit refill requests through [employer] or call your pharmacy and they will forward the refill request to us. Please allow 3 business days for your refill to be completed.          Additional Information About Your Visit        [email or web address] Information     [employer] lets you send messages to your doctor, view your test results, renew your prescriptions, schedule appointments and more. To sign up, go to [email or web address] . Click on \"Log in\" on the left side of the screen, which will take you to the Welcome page. Then click on \"Sign up Now\" on the right side of the page.     You will be asked to enter the access code listed below, as well as some personal information. Please follow the directions to create your username and password.     Your access code is: [email or web address]  Expires: 2018  6:30 AM     Your access code will  in 90 days. If you need help or a new code, please call your [facility] clinic or [phone].        Care EveryWhere ID     This is your Care EveryWhere ID. This could be used by other organizations to access your [facility] medical records  [id number]        Your Vitals Were     Pulse Temperature Height Pulse Oximetry " "BMI (Body Mass Index)       73 97.8  F (36.6  C) 1.727 m (5' 8\") 95% 42.13 kg/m2        Blood Pressure from Last 3 Encounters:   03/19/18 (!) 80/0   12/19/17 (!) 86/0   09/15/17 (!) 78/0    Weight from Last 3 Encounters:   03/19/18 125.7 kg (277 lb 1.6 oz)   12/19/17 119.9 kg (264 lb 4.8 oz)   09/15/17 113.1 kg (249 lb 4.8 oz)              We Performed the Following     (90148) INTERROGATE VENT ASSIST DEVICE, IN PERSON, DAGO JETER ANALYSIS PARAMETERS          Today's Medication Changes          These changes are accurate as of 3/19/18 11:43 AM.  If you have any questions, ask your nurse or doctor.               These medicines have changed or have updated prescriptions.        Dose/Directions    bumetanide 1 MG tablet   Commonly known as:  BUMEX   This may have changed:  additional instructions   Used for:  LVAD (left ventricular assist device) present (H)   Changed by:  Cate Marshall NP        Dose:  2 mg   Take 2 tablets (2 mg) by mouth daily   Quantity:  180 tablet   Refills:  3            Where to get your medicines      [facility] [address] [place] [address] [place] [address] [place] AT [facility] [address]  [address] [place] [address]     Phone:  [phone]     bumetanide 1 MG tablet                Primary Care Provider Office Phone # Fax #    Jovany Salas [phone]       [address] [address]        Equal Access to Services     [facility] Medicine[facility] AH: Hadii andrea carrera Sobrandon, waaxda luqadaha, qaybta kaalmada ademichaelyada, kendra ely. So [facility] [phone].    ATENCIÓN: Si habla español, tiene a roger disposición servicios gratuitos de asistencia lingüística. Llame al [phone].    We comply with applicable federal civil rights laws and [place] laws. We do not discriminate on the basis of race, color, national origin, age, disability, sex, sexual orientation, or gender " identity.            Thank you!     Thank you for choosing [facility]  for your care. Our goal is always to provide you with excellent care. Hearing back from our patients is one way we can continue to improve our services. Please take a few minutes to complete the written survey that you may receive in the mail after your visit with us. Thank you!             Your Updated Medication List - Protect others around you: Learn how to safely use, store and throw away your medicines at www.disposemymeds.org.          This list is accurate as of 3/19/18 11:43 AM.  Always use your most recent med list.                   Brand Name Dispense Instructions for use Diagnosis    acetaminophen 325 MG tablet    TYLENOL    100 tablet    Take 2 tablets (650 mg) by mouth every 6 hours    Acute post-operative pain       ALDACTONE 25 MG tablet   Generic drug:  spironolactone     30 tablet    Take 1 tablet (25 mg) by mouth daily    LVAD (left ventricular assist device) present (H), Chronic systolic congestive heart failure (H)       allopurinol 100 MG tablet    ZYLOPRIM    30 tablet    Take 1 tablet (100 mg) by mouth daily    Acute idiopathic gout, unspecified site       aspirin 81 MG chewable tablet     36 tablet    1 tablet (81 mg) by Oral or Feeding Tube route daily    LVAD (left ventricular assist device) present (H)       BREO ELLIPTA 200-25 MCG/INH oral inhaler   Generic drug:  fluticasone-vilanterol      Inhale 1 puff into the lungs daily        bumetanide 1 MG tablet    BUMEX    180 tablet    Take 2 tablets (2 mg) by mouth daily    LVAD (left ventricular assist device) present (H)       celeXA 20 MG tablet   Generic drug:  citalopram      Take 20 mg by mouth daily        COLCHICINE PO      Take by mouth daily        cyanocobalamin 1000 MCG/ML injection    VITAMIN B12     Inject 1,000 mcg into the muscle every 30 days        enoxaparin 120 MG/0.8ML injection    LOVENOX    10 Syringe    Please inject entire contents of  Syringe, 120mg of Lovenox subcutaneously every twelve hours daily.    LVAD (left ventricular assist device) present (H), Long-term (current) use of anticoagulants       gabapentin 300 MG capsule    NEURONTIN    90 capsule    Take 1 capsule (300 mg) by mouth 2 times daily    Nerve pain       HYDROmorphone 2 MG tablet    DILAUDID    45 tablet    Take 1 tablet (2 mg) by mouth every 4 hours as needed for moderate to severe pain    Acute post-operative pain       INCRUSE ELLIPTA 62.5 MCG/INH oral inhaler   Generic drug:  umeclidinium      Inhale 1 puff into the lungs daily        ipratropium - albuterol 0.5 mg/2.5 mg/3 mL 0.5-2.5 (3) MG/3ML neb solution    DUONEB     Take 3 mLs by nebulization every 4 hours as needed for shortness of breath / dyspnea or wheezing        losartan 25 MG tablet    COZAAR    90 tablet    Take 1 tablet (25 mg) by mouth daily    LVAD (left ventricular assist device) present (H), Chronic systolic congestive heart failure (H)       metFORMIN 500 MG tablet    GLUCOPHAGE    60 tablet    Take 0.5 tablets (250 mg) by mouth 2 times daily (with meals)    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, without long-term current use of insulin, unspecified CKD stage (H)       metoprolol succinate 25 MG 24 hr tablet    TOPROL-XL    180 tablet    Take 1 tablet (25 mg) by mouth 2 times daily    Chronic systolic congestive heart failure (H), LVAD (left ventricular assist device) present (H)       pantoprazole 40 MG EC tablet    PROTONIX    30 tablet    Take 1 tablet (40 mg) by mouth every morning    Acute post-operative pain       polyethylene glycol Packet    MIRALAX/GLYCOLAX    7 packet    Take 17 g by mouth daily as needed for constipation (for no bm in 1 day)    Acute post-operative pain       predniSONE 20 MG tablet    DELTASONE    20 tablet    Take 1 tablet (20 mg) by mouth daily    Acute idiopathic gout, unspecified site       PROAIR  (90 BASE) MCG/ACT Inhaler    Generic drug:  albuterol      Inhale 2 puffs into the lungs every 4 hours as needed for shortness of breath / dyspnea or wheezing        senna-docusate 8.6-50 MG per tablet    SENOKOT-S;PERICOLACE    100 tablet    Take 2 tablets by mouth 2 times daily as needed for constipation    Acute post-operative pain       SINGULAIR 10 MG tablet   Generic drug:  montelukast      Take 10 mg by mouth At Bedtime        traMADol 50 MG tablet    ULTRAM    28 tablet    Take 2 tablets (100 mg) by mouth every 6 hours as needed for moderate pain or breakthrough pain    Acute post-operative pain       traZODone 50 MG tablet    DESYREL    11 tablet    Take 50 mg by mouth nightly x one week then reduce to 25 mg (1/2 tab) by mouth nightly x one week then stop.    Acute post-operative pain, LVAD (left ventricular assist device) present (H)       * warfarin 3 MG tablet    COUMADIN    30 tablet    Use as directed.    LVAD (left ventricular assist device) present (H)       * warfarin 5 MG tablet    COUMADIN    60 tablet    Take 2 tablets (10 mg) by mouth daily    LVAD (left ventricular assist device) present (H), Chronic systolic congestive heart failure (H)       zinc sulfate 220 (50 ZN) MG capsule    ZINCATE     Take 220 mg by mouth 2 times daily        * Notice:  This list has 2 medication(s) that are the same as other medications prescribed for you. Read the directions carefully, and ask your doctor or other care provider to review them with you.

## 2018-03-19 NOTE — MR AVS SNAPSHOT
After Visit Summary   3/19/2018    iJm Willingham    MRN: [id number]           Patient Information     Date Of Birth          1955        Visit Information        Provider Department      3/19/2018 10:00 AM 1, Marcelo Cv Device [facility]        Today's Diagnoses     NICM (nonischemic cardiomyopathy) (H)/ EF 20%    -  1      Care Instructions    It was a pleasure to see you in clinic today. Please do not hesitate to call with any questions or concerns. We look forward to seeing you in clinic at your next device check in 3 months.    Shanon Suárez, RN  Electrophysiology Nurse Clinician  [facility]  During business hours call:  [phone]  After business hours please call: [phone]- select option #4 and ask for job code [phone].            Follow-ups after your visit        Your next 10 appointments already scheduled     Mar 27, 2018  6:00 AM CDT   FULL PULMONARY FUNCTION with  PFL B   [facility] Pulmonary Function Testing ([facility])    [address]  3rd Floor  [facility] [address] [phone]            Mar 27, 2018  6:50 AM CDT   (Arrive by 6:35 AM)   XR CHEST 2 VIEWS with XR1   [facility] Xray ([facility])    [address]  1st Floor  [facility] [address] [phone]           Please bring a list of your current medicines to your exam. (Include vitamins, minerals and over-thecounter medicines.) Leave your valuables at home.  Tell your doctor if there is a chance you may be pregnant.  You do not need to do anything special for this exam.            Mar 27, 2018  7:00 AM CDT   (Arrive by 6:45 AM)   New Patient Visit with Elfego Hayden MD   [facility] ([facility] [place])    [address]  [address] [place] [address] [phone]            Apr 16, 2018 10:30 AM CDT   Lab with  LAB     [facility] ([facility])    [address]  1st Floor  [facility] [address] [phone]            Apr 16, 2018 11:00 AM CDT   (Arrive by 10:45 AM)   Ventricular Assist Device with Cate Marshall NP   [facility] ([facility])    [address] [place] [address]  [address] [place] [address] [phone]            Jul 06, 2018  9:00 AM CDT   LAB with CASSI LAB GOLD WAITING   [facility], Lab ([facility] [place])    [address] [address]              Please do not eat 10-12 hours before your appointment if you are coming in fasting for labs on lipids, cholesterol, or glucose (sugar). This does not apply to pregnant women. Water, hot tea and black coffee (with nothing added) are okay. Do not drink other fluids, diet soda or chew gum.            Jul 06, 2018  9:30 AM CDT   Ech Complete Lvad* with UUEANNIKA   [facility],  Echocardiography ([facility] [place])    [address] [address] [phone]           1.  Please bring or wear a comfortable two-piece outfit. 2.  You may eat, drink and take your normal medicines. 3.  For any questions that cannot be answered, please contact the ordering physician            Jul 06, 2018  2:00 PM CDT   (Arrive by 1:45 PM)   Implanted Defibulator with Uc Cv Device 1   [facility] ([facility])    [address]  [address] [place] [address] [phone]            Jul 06, 2018  2:30 PM CDT   (Arrive by 2:15 PM)   Ventricular Assist Device with Delisa Montgomery MD   [facility] ([facility])    [address] [place] [address]  [address] [place] [id number] [phone]              Future tests that were ordered for you today     Open Future Orders        Priority Expected Expires Ordered    Basic metabolic panel  "Routine 3/30/2018 3/19/2019 3/19/2018            Who to contact     If you have questions or need follow up information about today's clinic visit or your schedule please contact [facility] directly at [phone].  Normal or non-critical lab and imaging results will be communicated to you by [employer], letter or phone within 4 business days after the clinic has received the results. If you do not hear from us within 7 days, please contact the clinic through [employer] or phone. If you have a critical or abnormal lab result, we will notify you by phone as soon as possible.  Submit refill requests through [employer] or call your pharmacy and they will forward the refill request to us. Please allow 3 business days for your refill to be completed.          Additional Information About Your Visit        [employer] Information     [employer] lets you send messages to your doctor, view your test results, renew your prescriptions, schedule appointments and more. To sign up, go to [email or web address] . Click on \"Log in\" on the left side of the screen, which will take you to the Welcome page. Then click on \"Sign up Now\" on the right side of the page.     You will be asked to enter the access code listed below, as well as some personal information. Please follow the directions to create your username and password.     Your access code is: [email or web address]  Expires: 2018  6:30 AM     Your access code will  in 90 days. If you need help or a new code, please call your [facility] clinic or [phone].        Care EveryWhere ID     This is your Care EveryWhere ID. This could be used by other organizations to access your [facility] medical records  [id number]         Blood Pressure from Last 3 Encounters:   18 (!) 80/0   17 (!) 86/0   09/15/17 (!) 78/0    Weight from Last 3 Encounters:   18 125.7 kg (277 lb 1.6 oz)   17 119.9 kg (264 lb 4.8 oz)   09/15/17 113.1 kg (249 lb 4.8 oz)              We " Performed the Following     (99147) ICD DEVICE PROGRAMMING EVAL, MULTI LEAD ICD          Today's Medication Changes          These changes are accurate as of 3/19/18 11:43 AM.  If you have any questions, ask your nurse or doctor.               These medicines have changed or have updated prescriptions.        Dose/Directions    bumetanide 1 MG tablet   Commonly known as:  BUMEX   This may have changed:  additional instructions   Used for:  LVAD (left ventricular assist device) present (H)   Changed by:  Cate Marshall, NP        Dose:  2 mg   Take 2 tablets (2 mg) by mouth daily   Quantity:  180 tablet   Refills:  3            Where to get your medicines      These medications [facility] sent to [facility] [address] [place] [address] [facility] [address] [place] [facility] [address] [place] [address]     Phone:  [phone]     bumetanide 1 MG tablet                Primary Care Provider Office Phone # Fax #    Jovany Salas [phone]       [address] [address]        Equal Access to Services     [facility]: Hadii aad ku hadasho Soomaali, waaxda luqadaha, qaybta kaalmada adeegyada, kendra salmon hayharesh marquez . So [facility] [phone].    ATENCIÓN: Si habla español, tiene a roger disposición servicios gratuitos de asistencia lingüística. [facility] [phone].    We comply with applicable federal civil rights laws and [place] laws. We do not discriminate on the basis of race, color, national origin, age, disability, sex, sexual orientation, or gender identity.            Thank you!     Thank you for choosing [facility]  for your care. Our goal is always to provide you with excellent care. Hearing back from our patients is one way we can continue to improve our services. Please take a few minutes to complete the written survey that you may receive in the mail after your visit with us. Thank you!              Your Updated Medication List - Protect others around you: Learn how to safely use, store and throw away your medicines at www.disposemymeds.org.          This list is accurate as of 3/19/18 11:43 AM.  Always use your most recent med list.                   Brand Name Dispense Instructions for use Diagnosis    acetaminophen 325 MG tablet    TYLENOL    100 tablet    Take 2 tablets (650 mg) by mouth every 6 hours    Acute post-operative pain       ALDACTONE 25 MG tablet   Generic drug:  spironolactone     30 tablet    Take 1 tablet (25 mg) by mouth daily    LVAD (left ventricular assist device) present (H), Chronic systolic congestive heart failure (H)       allopurinol 100 MG tablet    ZYLOPRIM    30 tablet    Take 1 tablet (100 mg) by mouth daily    Acute idiopathic gout, unspecified site       aspirin 81 MG chewable tablet     36 tablet    1 tablet (81 mg) by Oral or Feeding Tube route daily    LVAD (left ventricular assist device) present (H)       BREO ELLIPTA 200-25 MCG/INH oral inhaler   Generic drug:  fluticasone-vilanterol      Inhale 1 puff into the lungs daily        bumetanide 1 MG tablet    BUMEX    180 tablet    Take 2 tablets (2 mg) by mouth daily    LVAD (left ventricular assist device) present (H)       celeXA 20 MG tablet   Generic drug:  citalopram      Take 20 mg by mouth daily        COLCHICINE PO      Take by mouth daily        cyanocobalamin 1000 MCG/ML injection    VITAMIN B12     Inject 1,000 mcg into the muscle every 30 days        enoxaparin 120 MG/0.8ML injection    LOVENOX    10 Syringe    Please inject entire contents of Syringe, 120mg of Lovenox subcutaneously every twelve hours daily.    LVAD (left ventricular assist device) present (H), Long-term (current) use of anticoagulants       gabapentin 300 MG capsule    NEURONTIN    90 capsule    Take 1 capsule (300 mg) by mouth 2 times daily    Nerve pain       HYDROmorphone 2 MG tablet    DILAUDID    45 tablet    Take 1 tablet (2 mg) by  mouth every 4 hours as needed for moderate to severe pain    Acute post-operative pain       INCRUSE ELLIPTA 62.5 MCG/INH oral inhaler   Generic drug:  umeclidinium      Inhale 1 puff into the lungs daily        ipratropium - albuterol 0.5 mg/2.5 mg/3 mL 0.5-2.5 (3) MG/3ML neb solution    DUONEB     Take 3 mLs by nebulization every 4 hours as needed for shortness of breath / dyspnea or wheezing        losartan 25 MG tablet    COZAAR    90 tablet    Take 1 tablet (25 mg) by mouth daily    LVAD (left ventricular assist device) present (H), Chronic systolic congestive heart failure (H)       metFORMIN 500 MG tablet    GLUCOPHAGE    60 tablet    Take 0.5 tablets (250 mg) by mouth 2 times daily (with meals)    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, without long-term current use of insulin, unspecified CKD stage (H)       metoprolol succinate 25 MG 24 hr tablet    TOPROL-XL    180 tablet    Take 1 tablet (25 mg) by mouth 2 times daily    Chronic systolic congestive heart failure (H), LVAD (left ventricular assist device) present (H)       pantoprazole 40 MG EC tablet    PROTONIX    30 tablet    Take 1 tablet (40 mg) by mouth every morning    Acute post-operative pain       polyethylene glycol Packet    MIRALAX/GLYCOLAX    7 packet    Take 17 g by mouth daily as needed for constipation (for no bm in 1 day)    Acute post-operative pain       predniSONE 20 MG tablet    DELTASONE    20 tablet    Take 1 tablet (20 mg) by mouth daily    Acute idiopathic gout, unspecified site       PROAIR  (90 BASE) MCG/ACT Inhaler   Generic drug:  albuterol      Inhale 2 puffs into the lungs every 4 hours as needed for shortness of breath / dyspnea or wheezing        senna-docusate 8.6-50 MG per tablet    SENOKOT-S;PERICOLACE    100 tablet    Take 2 tablets by mouth 2 times daily as needed for constipation    Acute post-operative pain       SINGULAIR 10 MG tablet   Generic drug:  montelukast      Take  10 mg by mouth At Bedtime        traMADol 50 MG tablet    ULTRAM    28 tablet    Take 2 tablets (100 mg) by mouth every 6 hours as needed for moderate pain or breakthrough pain    Acute post-operative pain       traZODone 50 MG tablet    DESYREL    11 tablet    Take 50 mg by mouth nightly x one week then reduce to 25 mg (1/2 tab) by mouth nightly x one week then stop.    Acute post-operative pain, LVAD (left ventricular assist device) present (H)       * warfarin 3 MG tablet    COUMADIN    30 tablet    Use as directed.    LVAD (left ventricular assist device) present (H)       * warfarin 5 MG tablet    COUMADIN    60 tablet    Take 2 tablets (10 mg) by mouth daily    LVAD (left ventricular assist device) present (H), Chronic systolic congestive heart failure (H)       zinc sulfate 220 (50 ZN) MG capsule    ZINCATE     Take 220 mg by mouth 2 times daily        * Notice:  This list has 2 medication(s) that are the same as other medications prescribed for you. Read the directions carefully, and ask your doctor or other care provider to review them with you.

## 2018-03-19 NOTE — PATIENT INSTRUCTIONS
It was a pleasure to see you in clinic today. Please do not hesitate to call with any questions or concerns. We look forward to seeing you in clinic at your next device check in 3 months.    Shanon Suárez, REGINALD  Electrophysiology Nurse Clinician  [facility]  During business hours call:  [phone]  After business hours please call: [phone]- select option #4 and ask for job code [phone].

## 2018-03-19 NOTE — MR AVS SNAPSHOT
Jim Willingham   3/19/2018   Anticoagulation Therapy Visit    Description:  62 year old male   Provider:  Delisa Hillman RN   Department:  [facility]           INR as of 3/19/2018     Today's INR       Anticoagulation Summary as of 3/19/2018     INR goal 2.0-3.0   Today's INR    Full instructions 3/19: 10 mg; 3/20: 7.5 mg; 3/21: 10 mg; 3/22: 7.5 mg; Otherwise 7.5 mg on Mon, Wed, Fri; 10 mg all other days   Next INR check 3/23/2018    Indications   LVAD (left ventricular assist device) present (H) [Z95.811]  Long-term (current) use of anticoagulants [Z79.01] [Z79.01]         March 2018 Details    Sun Mon Tue Wed Thu Fri Sat         1               2               3                 4               5               6               7               8               9               10                 11               12               13               14               15               16               17                 18               19      10 mg   See details      20      7.5 mg         21      10 mg         22      7.5 mg         23            24                 25               26               27               28               29               30               31                Date Details   03/19 This INR check       Date of next INR:  3/23/2018         How to take your warfarin dose     To take:  7.5 mg Take 1.5 of the 5 mg tablets.    To take:  10 mg Take 2 of the 5 mg tablets.

## 2018-03-23 ENCOUNTER — ANTICOAGULATION THERAPY VISIT (OUTPATIENT)
Dept: ANTICOAGULATION | Facility: CLINIC | Age: 63
End: 2018-03-23

## 2018-03-23 DIAGNOSIS — Z95.811 LVAD (LEFT VENTRICULAR ASSIST DEVICE) PRESENT (H): ICD-10-CM

## 2018-03-23 DIAGNOSIS — Z79.01 LONG-TERM (CURRENT) USE OF ANTICOAGULANTS: ICD-10-CM

## 2018-03-23 LAB — INR BLD: 1.1 (ref 0.86–1.14)

## 2018-03-23 PROCEDURE — 85610 PROTHROMBIN TIME: CPT | Mod: QW | Performed by: INTERNAL MEDICINE

## 2018-03-23 PROCEDURE — 36416 COLLJ CAPILLARY BLOOD SPEC: CPT | Performed by: INTERNAL MEDICINE

## 2018-03-23 NOTE — MR AVS SNAPSHOT
Jim Willingham   3/23/2018   Anticoagulation Therapy Visit    Description:  62 year old male   Provider:  Maru Alonso, RN   Department:  [facility]           INR as of 3/23/2018     Today's INR 1.1!      Anticoagulation Summary as of 3/23/2018     INR goal 2.0-3.0   Today's INR 1.1!   Full instructions 3/23: 12.5 mg; 3/24: 12.5 mg; Otherwise 7.5 mg on Mon, Wed, Fri; 10 mg all other days   Next INR check 3/26/2018    Indications   LVAD (left ventricular assist device) present (H) [Z95.811]  Long-term (current) use of anticoagulants [Z79.01] [Z79.01]         March 2018 Details    Sun Mon Tue Wed Thu Fri Sat         1               2               3                 4               5               6               7               8               9               10                 11               12               13               14               15               16               17                 18               19               20               21               22               23      12.5 mg   See details      24      12.5 mg           25      10 mg         26            27               28               29               30               31                Date Details   03/23 This INR check       Date of next INR:  3/26/2018         How to take your warfarin dose     To take:  7.5 mg Take 1.5 of the 5 mg tablets.    To take:  10 mg Take 2 of the 5 mg tablets.    To take:  12.5 mg Take 2.5 of the 5 mg tablets.

## 2018-03-23 NOTE — PROGRESS NOTES
ANTICOAGULATION FOLLOW-UP CLINIC VISIT    Patient Name:  Jim Willingham  Date:  3/23/2018  Contact Type:  Telephone    SUBJECTIVE:     Patient Findings     Positives Unexplained INR or factor level change    Comments Jim denies missing any doses of warfarin.  He reports that he had some sauerkraut on a Hai sandwich other argueta he hasn't had many greens.  Jim is 125 kilos and CrCl 55.1    Jim will continue with ASA 325mg daily.  Per LVAD team Jim should start Lovenox 120mg BID until INR therapeutic.  Jim reports that he has Lovenox at home and will call [facility] clinic if he needs more.           OBJECTIVE    INR Point of Care   Date Value Ref Range Status   03/23/2018 1.1 0.86 - 1.14 Final     Comment:     This test is intended for monitoring Coumadin therapy.  Results are not   accurate in patients with prolonged INR due to factor deficiency.         ASSESSMENT / PLAN  No question data found.  Anticoagulation Summary as of 3/23/2018     INR goal 2.0-3.0   Today's INR 1.1!   Maintenance plan 7.5 mg (5 mg x 1.5) on Mon, Wed, Fri; 10 mg (5 mg x 2) all other days   Full instructions 3/23: 12.5 mg; 3/24: 12.5 mg; Otherwise 7.5 mg on Mon, Wed, Fri; 10 mg all other days   Weekly total 62.5 mg   Plan last modified Maru Alonso RN (1/26/2018)   Next INR check 3/26/2018   Priority INR   Target end date     Indications   LVAD (left ventricular assist device) present (H) [Z95.811]  Long-term (current) use of anticoagulants [Z79.01] [Z79.01]         Anticoagulation Episode Summary     INR check location     Preferred lab     Send INR reminders to [facility]    Comments HIPPA Forms mailed 6/26/17  Labs drawn either at [facility] or [facility]      Anticoagulation Care Providers     Provider Role Specialty Phone number    Delisa Montgomery MD Responsible Cardiology [phone]            See the Encounter Report to view Anticoagulation Flowsheet and Dosing Calendar (Go to Encounters tab  in chart review, and find the Anticoagulation Therapy Visit)    Spoke with Jim.    Maru Alonso RN

## 2018-03-26 ENCOUNTER — ANTICOAGULATION THERAPY VISIT (OUTPATIENT)
Dept: ANTICOAGULATION | Facility: CLINIC | Age: 63
End: 2018-03-26

## 2018-03-26 DIAGNOSIS — Z95.811 LVAD (LEFT VENTRICULAR ASSIST DEVICE) PRESENT (H): ICD-10-CM

## 2018-03-26 DIAGNOSIS — Z79.01 LONG-TERM (CURRENT) USE OF ANTICOAGULANTS: ICD-10-CM

## 2018-03-26 LAB — INR BLD: 1.7 (ref 0.86–1.14)

## 2018-03-26 PROCEDURE — 36416 COLLJ CAPILLARY BLOOD SPEC: CPT | Performed by: INTERNAL MEDICINE

## 2018-03-26 PROCEDURE — 85610 PROTHROMBIN TIME: CPT | Mod: QW | Performed by: INTERNAL MEDICINE

## 2018-03-26 NOTE — PROGRESS NOTES
ANTICOAGULATION FOLLOW-UP CLINIC VISIT    Patient Name:  Jim Willingham  Date:  3/26/2018  Contact Type:  Telephone    SUBJECTIVE:     Patient Findings     Comments Jim will continue with Lovenox 120mg BID and ASA 325mg daily.  Jim reports he is having a lot of bruising on his abdomen so he will give some of the injections in his thigh.  Patient is very anxious to get off Lovenox so dosing is aggressive for the next couple of days.           OBJECTIVE    INR Point of Care   Date Value Ref Range Status   03/26/2018 1.7 (H) 0.86 - 1.14 Final     Comment:     This test is intended for monitoring Coumadin therapy.  Results are not   accurate in patients with prolonged INR due to factor deficiency.         ASSESSMENT / PLAN  INR assessment SUB    Recheck INR In: 2 DAYS    INR Location Clinic      Anticoagulation Summary as of 3/26/2018     INR goal 2.0-3.0   Today's INR 1.7!   Maintenance plan 7.5 mg (5 mg x 1.5) on Mon, Wed, Fri; 10 mg (5 mg x 2) all other days   Full instructions 3/26: 12.5 mg; 3/27: 12.5 mg; Otherwise 7.5 mg on Mon, Wed, Fri; 10 mg all other days   Weekly total 62.5 mg   Plan last modified Maru Alonso RN (1/26/2018)   Next INR check 3/28/2018   Priority INR   Target end date     Indications   LVAD (left ventricular assist device) present (H) [Z95.811]  Long-term (current) use of anticoagulants [Z79.01] [Z79.01]         Anticoagulation Episode Summary     INR check location     Preferred lab     Send INR reminders to [facility]    Comments HIPPA Forms mailed 6/26/17  Labs drawn either at [facility] or [facility]      Anticoagulation Care Providers     Provider Role Specialty Phone number    Delisa Montgomery MD Responsible Cardiology [phone]            See the Encounter Report to view Anticoagulation Flowsheet and Dosing Calendar (Go to Encounters tab in chart review, and find the Anticoagulation Therapy Visit)    Spoke with Jim.    Maru Alonso RN

## 2018-03-26 NOTE — MR AVS SNAPSHOT
Jim Willingham   3/26/2018   Anticoagulation Therapy Visit    Description:  62 year old male   Provider:  Maru Alonso, RN   Department:  [facility]           INR as of 3/26/2018     Today's INR 1.7!      Anticoagulation Summary as of 3/26/2018     INR goal 2.0-3.0   Today's INR 1.7!   Full instructions 3/26: 12.5 mg; 3/27: 12.5 mg; Otherwise 7.5 mg on Mon, Wed, Fri; 10 mg all other days   Next INR check 3/28/2018    Indications   LVAD (left ventricular assist device) present (H) [Z95.811]  Long-term (current) use of anticoagulants [Z79.01] [Z79.01]         March 2018 Details    Sun Mon Tue Wed Thu Fri Sat         1               2               3                 4               5               6               7               8               9               10                 11               12               13               14               15               16               17                 18               19               20               21               22               23               24                 25               26      12.5 mg   See details      27      12.5 mg         28            29               30               31                Date Details   03/26 This INR check       Date of next INR:  3/28/2018         How to take your warfarin dose     To take:  7.5 mg Take 1.5 of the 5 mg tablets.    To take:  12.5 mg Take 2.5 of the 5 mg tablets.

## 2018-03-28 ENCOUNTER — ANTICOAGULATION THERAPY VISIT (OUTPATIENT)
Dept: ANTICOAGULATION | Facility: CLINIC | Age: 63
End: 2018-03-28

## 2018-03-28 DIAGNOSIS — Z95.811 LVAD (LEFT VENTRICULAR ASSIST DEVICE) PRESENT (H): ICD-10-CM

## 2018-03-28 DIAGNOSIS — Z79.01 LONG-TERM (CURRENT) USE OF ANTICOAGULANTS: ICD-10-CM

## 2018-03-28 LAB — INR BLD: 2.1 (ref 0.86–1.14)

## 2018-03-28 PROCEDURE — 85610 PROTHROMBIN TIME: CPT | Mod: QW | Performed by: FAMILY MEDICINE

## 2018-03-28 PROCEDURE — 36416 COLLJ CAPILLARY BLOOD SPEC: CPT | Performed by: FAMILY MEDICINE

## 2018-03-28 NOTE — PROGRESS NOTES
ANTICOAGULATION FOLLOW-UP CLINIC VISIT    Patient Name:  Jim Willingham  Date:  3/28/2018  Contact Type:  Telephone    SUBJECTIVE:     Patient Findings     Comments Instructions were given to stop Lovenox and resume ASA 81mg daily.  Jim has additional blood work due for the LVAD team that is due on 3/29 so he will have his INR done then.            OBJECTIVE    INR Point of Care   Date Value Ref Range Status   03/28/2018 2.1 (H) 0.86 - 1.14 Final     Comment:     This test is intended for monitoring Coumadin therapy.  Results are not   accurate in patients with prolonged INR due to factor deficiency.         ASSESSMENT / PLAN  INR assessment THER    Recheck INR In: 2 DAYS    INR Location Clinic      Anticoagulation Summary as of 3/28/2018     INR goal 2.0-3.0   Today's INR 2.1   Maintenance plan No maintenance plan   Full instructions 3/28: 10 mg; 3/29: 10 mg   Weekly total 62.5 mg   Plan last modified Maru Alonso RN (3/28/2018)   Next INR check 3/30/2018   Priority INR   Target end date     Indications   LVAD (left ventricular assist device) present (H) [Z95.811]  Long-term (current) use of anticoagulants [Z79.01] [Z79.01]         Anticoagulation Episode Summary     INR check location     Preferred lab     Send INR reminders to [facility]    Comments HIPPA Forms mailed 6/26/17  Labs drawn either at [facility] or [facility]      Anticoagulation Care Providers     Provider Role Specialty Phone number    Delisa Montgomery MD Responsible Cardiology [phone]            See the Encounter Report to view Anticoagulation Flowsheet and Dosing Calendar (Go to Encounters tab in chart review, and find the Anticoagulation Therapy Visit)    Spoke with Jim.     Maru Alonso, REGINALD

## 2018-03-28 NOTE — MR AVS SNAPSHOT
Jim Willingham   3/28/2018   Anticoagulation Therapy Visit    Description:  62 year old male   Provider:  Maru Alonso, RN   Department:  [facility]           INR as of 3/28/2018     Today's INR 2.1      Anticoagulation Summary as of 3/28/2018     INR goal 2.0-3.0   Today's INR 2.1   Full instructions 3/28: 10 mg; 3/29: 10 mg   Next INR check 3/30/2018    Indications   LVAD (left ventricular assist device) present (H) [Z95.811]  Long-term (current) use of anticoagulants [Z79.01] [Z79.01]         March 2018 Details    Sun Mon Tue Wed Thu Fri Sat         1               2               3                 4               5               6               7               8               9               10                 11               12               13               14               15               16               17                 18               19               20               21               22               23               24                 25               26               27               28      10 mg   See details      29      10 mg         30            31                Date Details   03/28 This INR check       Date of next INR:  3/30/2018         How to take your warfarin dose     To take:  10 mg Take 2 of the 5 mg tablets.

## 2018-03-30 ENCOUNTER — ANTICOAGULATION THERAPY VISIT (OUTPATIENT)
Dept: ANTICOAGULATION | Facility: CLINIC | Age: 63
End: 2018-03-30

## 2018-03-30 DIAGNOSIS — Z79.01 LONG-TERM (CURRENT) USE OF ANTICOAGULANTS: ICD-10-CM

## 2018-03-30 DIAGNOSIS — Z95.811 LVAD (LEFT VENTRICULAR ASSIST DEVICE) PRESENT (H): ICD-10-CM

## 2018-03-30 DIAGNOSIS — I50.22 CHRONIC SYSTOLIC CONGESTIVE HEART FAILURE (H): ICD-10-CM

## 2018-03-30 LAB
ANION GAP SERPL CALCULATED.3IONS-SCNC: 8 MMOL/L (ref 3–14)
BUN SERPL-MCNC: 25 MG/DL (ref 7–30)
CALCIUM SERPL-MCNC: 8.5 MG/DL (ref 8.5–10.1)
CHLORIDE SERPL-SCNC: 101 MMOL/L (ref 94–109)
CO2 SERPL-SCNC: 28 MMOL/L (ref 20–32)
CREAT SERPL-MCNC: 1.4 MG/DL (ref 0.66–1.25)
GFR SERPL CREATININE-BSD FRML MDRD: 52 ML/MIN/1.7M2
GLUCOSE SERPL-MCNC: 101 MG/DL (ref 70–99)
INR PPP: 2.25 (ref 0.86–1.14)
POTASSIUM SERPL-SCNC: 4.3 MMOL/L (ref 3.4–5.3)
SODIUM SERPL-SCNC: 137 MMOL/L (ref 133–144)

## 2018-03-30 PROCEDURE — 85610 PROTHROMBIN TIME: CPT | Performed by: INTERNAL MEDICINE

## 2018-03-30 PROCEDURE — 80048 BASIC METABOLIC PNL TOTAL CA: CPT | Performed by: INTERNAL MEDICINE

## 2018-03-30 PROCEDURE — 36415 COLL VENOUS BLD VENIPUNCTURE: CPT | Performed by: INTERNAL MEDICINE

## 2018-03-30 NOTE — MR AVS SNAPSHOT
Jim Willingham   3/30/2018   Anticoagulation Therapy Visit    Description:  62 year old male   Provider:  Suyapa Crawford, RN   Department:  [facility]           INR as of 3/30/2018     Today's INR 2.25      Anticoagulation Summary as of 3/30/2018     INR goal 2.0-3.0   Today's INR 2.25   Full instructions 3/30: 12.5 mg; 3/31: 10 mg; 4/1: 12.5 mg; 4/2: 10 mg   Next INR check 4/3/2018    Indications   LVAD (left ventricular assist device) present (H) [Z95.811]  Long-term (current) use of anticoagulants [Z79.01] [Z79.01]         March 2018 Details    Sun Mon Tue Wed Thu Fri Sat         1               2               3                 4               5               6               7               8               9               10                 11               12               13               14               15               16               17                 18               19               20               21               22               23               24                 25               26               27               28               29               30      12.5 mg   See details      31      10 mg          Date Details   03/30 This INR check               How to take your warfarin dose     To take:  10 mg Take 2 of the 5 mg tablets.    To take:  12.5 mg Take 2.5 of the 5 mg tablets.           April 2018 Details    Sun Mon Tue Wed Thu Fri Sat     1      12.5 mg         2      10 mg         3            4               5               6               7                 8               9               10               11               12               13               14                 15               16               17               18               19               20               21                 22               23               24               25               26               27               28                 29               30                     Date Details   No additional  details    Date of next INR:  4/3/2018         How to take your warfarin dose     To take:  10 mg Take 2 of the 5 mg tablets.    To take:  12.5 mg Take 2.5 of the 5 mg tablets.

## 2018-03-30 NOTE — PROGRESS NOTES
ANTICOAGULATION FOLLOW-UP CLINIC VISIT    Patient Name:  Jim Willingham  Date:  3/30/2018  Contact Type:  Telephone    SUBJECTIVE:     Patient Findings     Positives No Problem Findings    Comments Pt had total of 80 mg last 7 days - will check again in 4 days since it has been difficult to keep pt in his goal range.            OBJECTIVE    INR   Date Value Ref Range Status   03/30/2018 2.25 (H) 0.86 - 1.14 Final       ASSESSMENT / PLAN  INR assessment THER    Recheck INR In: 4 DAYS    INR Location Clinic      Anticoagulation Summary as of 3/30/2018     INR goal 2.0-3.0   Today's INR 2.25   Maintenance plan No maintenance plan   Full instructions 3/30: 12.5 mg; 3/31: 10 mg; 4/1: 12.5 mg; 4/2: 10 mg   Weekly total 62.5 mg   Plan last modified Maru Alonso RN (3/28/2018)   Next INR check 4/3/2018   Priority INR   Target end date     Indications   LVAD (left ventricular assist device) present (H) [Z95.811]  Long-term (current) use of anticoagulants [Z79.01] [Z79.01]         Anticoagulation Episode Summary     INR check location     Preferred lab     Send INR reminders to [facility]    Comments HIPPA Forms mailed 6/26/17  Labs drawn either at [facility] or [facility]      Anticoagulation Care Providers     Provider Role Specialty Phone number    Delisa Montgomery MD Responsible Cardiology [phone]            See the Encounter Report to view Anticoagulation Flowsheet and Dosing Calendar (Go to Encounters tab in chart review, and find the Anticoagulation Therapy Visit)    Spoke with patient. Gave them their lab results and new warfarin recommendation.  No changes in health, medication, or diet. No missed doses, no falls. No signs or symptoms of bleed or clotting.  See above notation     Suyapa Crawford, REGINALD

## 2018-04-03 ENCOUNTER — ANTICOAGULATION THERAPY VISIT (OUTPATIENT)
Dept: ANTICOAGULATION | Facility: CLINIC | Age: 63
End: 2018-04-03

## 2018-04-03 DIAGNOSIS — Z95.811 LVAD (LEFT VENTRICULAR ASSIST DEVICE) PRESENT (H): ICD-10-CM

## 2018-04-03 DIAGNOSIS — Z79.01 LONG-TERM (CURRENT) USE OF ANTICOAGULANTS: ICD-10-CM

## 2018-04-03 LAB — INR BLD: 2.5 (ref 0.86–1.14)

## 2018-04-03 PROCEDURE — 36416 COLLJ CAPILLARY BLOOD SPEC: CPT | Performed by: INTERNAL MEDICINE

## 2018-04-03 PROCEDURE — 85610 PROTHROMBIN TIME: CPT | Mod: QW | Performed by: INTERNAL MEDICINE

## 2018-04-03 NOTE — PROGRESS NOTES
ANTICOAGULATION FOLLOW-UP CLINIC VISIT    Patient Name:  Jim Willingham  Date:  4/3/2018  Contact Type:  Telephone    SUBJECTIVE:     Patient Findings     Positives No Problem Findings        Had 76 mg over the past week - was being aggressively dosed to get him off of Lovenox.      OBJECTIVE    INR Point of Care   Date Value Ref Range Status   04/03/2018 2.5 (H) 0.86 - 1.14 Final     Comment:     This test is intended for monitoring Coumadin therapy.  Results are not   accurate in patients with prolonged INR due to factor deficiency.         ASSESSMENT / PLAN  INR assessment THER    Recheck INR In: 3 DAYS    INR Location Clinic      Anticoagulation Summary as of 4/3/2018     INR goal 2.0-3.0   Today's INR 2.5   Maintenance plan No maintenance plan   Full instructions 4/3: 10 mg; 4/4: 10 mg; 4/5: 10 mg   Weekly total 62.5 mg   Plan last modified Maru Alonso RN (3/28/2018)   Next INR check 4/6/2018   Priority INR   Target end date     Indications   LVAD (left ventricular assist device) present (H) [Z95.811]  Long-term (current) use of anticoagulants [Z79.01] [Z79.01]         Anticoagulation Episode Summary     INR check location     Preferred lab     Send INR reminders to [facility]    Comments HIPPA Forms mailed 6/26/17  Labs drawn either at [facility] or [facility]      Anticoagulation Care Providers     Provider Role Specialty Phone number    Delisa Montgomery MD Responsible Cardiology [phone]            See the Encounter Report to view Anticoagulation Flowsheet and Dosing Calendar (Go to Encounters tab in chart review, and find the Anticoagulation Therapy Visit)    Spoke with patient. Gave them their lab results and new warfarin recommendation.  No changes in health, medication, or diet. No missed doses, no falls. No signs or symptoms of bleed or clotting. We will continue with 10 mg for the next 3 days and have him recheck on Friday.     Tori Heard [facility]

## 2018-04-03 NOTE — MR AVS SNAPSHOT
Jim Willingham   4/3/2018   Anticoagulation Therapy Visit    Description:  62 year old male   Provider:  Tori Heard [facility]   Department:  [facility]           INR as of 4/3/2018     Today's INR 2.5      Anticoagulation Summary as of 4/3/2018     INR goal 2.0-3.0   Today's INR 2.5   Full instructions 4/3: 10 mg; 4/4: 10 mg; 4/5: 10 mg   Next INR check 4/6/2018    Indications   LVAD (left ventricular assist device) present (H) [Z95.811]  Long-term (current) use of anticoagulants [Z79.01] [Z79.01]         April 2018 Details    Sun Mon Tue Wed Thu Fri Sat     1               2               3      10 mg   See details      4      10 mg         5      10 mg         6            7                 8               9               10               11               12               13               14                 15               16               17               18               19               20               21                 22               23               24               25               26               27               28                 29               30                     Date Details   04/03 This INR check       Date of next INR:  4/6/2018         How to take your warfarin dose     To take:  10 mg Take 2 of the 5 mg tablets.

## 2018-04-05 ENCOUNTER — CARE COORDINATION (OUTPATIENT)
Dept: CARDIOLOGY | Facility: CLINIC | Age: 63
End: 2018-04-05

## 2018-04-05 NOTE — PROGRESS NOTES
Called pt to review labs from last week. Labs are stable. Pt reports continued shortness breath. Current weight is 264lb. He requested increasing diuretics again. Agreed that pt can increase to 2mg Bumex daily. He has an appt on 4/16 with Cate Marshall NP. Will call pt next week to check on weight and symptoms.

## 2018-04-06 ENCOUNTER — ANTICOAGULATION THERAPY VISIT (OUTPATIENT)
Dept: ANTICOAGULATION | Facility: CLINIC | Age: 63
End: 2018-04-06

## 2018-04-06 ENCOUNTER — CARE COORDINATION (OUTPATIENT)
Dept: CARDIOLOGY | Facility: CLINIC | Age: 63
End: 2018-04-06

## 2018-04-06 DIAGNOSIS — Z95.811 LVAD (LEFT VENTRICULAR ASSIST DEVICE) PRESENT (H): Primary | ICD-10-CM

## 2018-04-06 DIAGNOSIS — I50.22 CHRONIC SYSTOLIC CONGESTIVE HEART FAILURE (H): ICD-10-CM

## 2018-04-06 DIAGNOSIS — Z95.811 LVAD (LEFT VENTRICULAR ASSIST DEVICE) PRESENT (H): ICD-10-CM

## 2018-04-06 DIAGNOSIS — Z79.01 LONG-TERM (CURRENT) USE OF ANTICOAGULANTS: ICD-10-CM

## 2018-04-06 LAB
ALBUMIN SERPL-MCNC: 3.9 G/DL (ref 3.4–5)
ALP SERPL-CCNC: 106 U/L (ref 40–150)
ALT SERPL W P-5'-P-CCNC: 44 U/L (ref 0–70)
ANION GAP SERPL CALCULATED.3IONS-SCNC: 9 MMOL/L (ref 3–14)
AST SERPL W P-5'-P-CCNC: 21 U/L (ref 0–45)
BILIRUB SERPL-MCNC: 0.6 MG/DL (ref 0.2–1.3)
BUN SERPL-MCNC: 52 MG/DL (ref 7–30)
CALCIUM SERPL-MCNC: 9 MG/DL (ref 8.5–10.1)
CHLORIDE SERPL-SCNC: 99 MMOL/L (ref 94–109)
CO2 SERPL-SCNC: 29 MMOL/L (ref 20–32)
CREAT SERPL-MCNC: 1.33 MG/DL (ref 0.66–1.25)
ERYTHROCYTE [DISTWIDTH] IN BLOOD BY AUTOMATED COUNT: 15.3 % (ref 10–15)
GFR SERPL CREATININE-BSD FRML MDRD: 55 ML/MIN/1.7M2
GLUCOSE SERPL-MCNC: 190 MG/DL (ref 70–99)
HCT VFR BLD AUTO: 39.3 % (ref 40–53)
HGB BLD-MCNC: 11.7 G/DL (ref 13.3–17.7)
INR BLD: 1.9 (ref 0.86–1.14)
LDH SERPL L TO P-CCNC: 342 U/L (ref 85–227)
MCH RBC QN AUTO: 24.9 PG (ref 26.5–33)
MCHC RBC AUTO-ENTMCNC: 29.8 G/DL (ref 31.5–36.5)
MCV RBC AUTO: 84 FL (ref 78–100)
PLATELET # BLD AUTO: 304 10E9/L (ref 150–450)
POTASSIUM SERPL-SCNC: 4 MMOL/L (ref 3.4–5.3)
PROT SERPL-MCNC: 7.4 G/DL (ref 6.8–8.8)
RBC # BLD AUTO: 4.69 10E12/L (ref 4.4–5.9)
SODIUM SERPL-SCNC: 137 MMOL/L (ref 133–144)
WBC # BLD AUTO: 9.3 10E9/L (ref 4–11)

## 2018-04-06 PROCEDURE — 85610 PROTHROMBIN TIME: CPT | Mod: QW | Performed by: INTERNAL MEDICINE

## 2018-04-06 PROCEDURE — 36416 COLLJ CAPILLARY BLOOD SPEC: CPT | Performed by: INTERNAL MEDICINE

## 2018-04-06 PROCEDURE — 83615 LACTATE (LD) (LDH) ENZYME: CPT | Performed by: INTERNAL MEDICINE

## 2018-04-06 PROCEDURE — 80053 COMPREHEN METABOLIC PANEL: CPT | Performed by: INTERNAL MEDICINE

## 2018-04-06 PROCEDURE — 85027 COMPLETE CBC AUTOMATED: CPT | Performed by: INTERNAL MEDICINE

## 2018-04-06 NOTE — MR AVS SNAPSHOT
Jim Willingham   4/6/2018   Anticoagulation Therapy Visit    Description:  62 year old male   Provider:  Maru Alonso, RN   Department:  [facility]           INR as of 4/6/2018     Today's INR 1.9!      Anticoagulation Summary as of 4/6/2018     INR goal 2.0-3.0   Today's INR 1.9!   Full instructions 4/6: 12.5 mg; 4/7: 12.5 mg; 4/8: 10 mg   Next INR check 4/9/2018    Indications   LVAD (left ventricular assist device) present (H) [Z95.811]  Long-term (current) use of anticoagulants [Z79.01] [Z79.01]         April 2018 Details    Sun Mon Tue Wed Thu Fri Sat     1               2               3               4               5               6      12.5 mg   See details      7      12.5 mg           8      10 mg         9            10               11               12               13               14                 15               16               17               18               19               20               21                 22               23               24               25               26               27               28                 29               30                     Date Details   04/06 This INR check       Date of next INR:  4/9/2018         How to take your warfarin dose     To take:  10 mg Take 2 of the 5 mg tablets.    To take:  12.5 mg Take 2.5 of the 5 mg tablets.

## 2018-04-06 NOTE — PROGRESS NOTES
Pt called this am reporting shortness of breath and not feeling well. He doesn't feel that bumex 2mg daily is enough. Pt is able to tell that he feels the shortness of breath is fluid related and not lung related, as he doesn't feel the need for neb treatments. Requested pt get labs done today. Reviewed labs with Dr. Montgomery, all labs are at baseline. Received instructions for pt to increase bumex to 2mg qam and 1mg q afternoon, and take aldactone 25mg BID, and to be seen in clinic next week. Pt verbalized understanding and will check in on Monday.

## 2018-04-06 NOTE — PROGRESS NOTES
ANTICOAGULATION FOLLOW-UP CLINIC VISIT    Patient Name:  Jim Willingham  Date:  4/6/2018  Contact Type:  Telephone    SUBJECTIVE:     Patient Findings     Comments Jim will increase ASA 325mg daily.            OBJECTIVE    INR Point of Care   Date Value Ref Range Status   04/06/2018 1.9 (H) 0.86 - 1.14 Final     Comment:     This test is intended for monitoring Coumadin therapy.  Results are not   accurate in patients with prolonged INR due to factor deficiency.         ASSESSMENT / PLAN  No question data found.  Anticoagulation Summary as of 4/6/2018     INR goal 2.0-3.0   Today's INR 1.9!   Maintenance plan No maintenance plan   Full instructions 4/6: 12.5 mg; 4/7: 12.5 mg; 4/8: 10 mg   Weekly total 62.5 mg   Plan last modified Maru Alonso RN (3/28/2018)   Next INR check 4/9/2018   Priority INR   Target end date     Indications   LVAD (left ventricular assist device) present (H) [Z95.811]  Long-term (current) use of anticoagulants [Z79.01] [Z79.01]         Anticoagulation Episode Summary     INR check location     Preferred lab     Send INR reminders to [facility]    Comments HIPPA Forms mailed 6/26/17  Labs drawn either at [facility] or [facility]      Anticoagulation Care Providers     Provider Role Specialty Phone number    Delisa Montgomery MD Responsible Cardiology [phone]            See the Encounter Report to view Anticoagulation Flowsheet and Dosing Calendar (Go to Encounters tab in chart review, and find the Anticoagulation Therapy Visit)    Spoke with Jim.    Maru Alonso, REGINALD

## 2018-04-09 ENCOUNTER — ANTICOAGULATION THERAPY VISIT (OUTPATIENT)
Dept: ANTICOAGULATION | Facility: CLINIC | Age: 63
End: 2018-04-09

## 2018-04-09 DIAGNOSIS — K21.9 GASTROESOPHAGEAL REFLUX DISEASE WITHOUT ESOPHAGITIS: Primary | ICD-10-CM

## 2018-04-09 DIAGNOSIS — G89.18 ACUTE POST-OPERATIVE PAIN: ICD-10-CM

## 2018-04-09 DIAGNOSIS — Z79.01 LONG-TERM (CURRENT) USE OF ANTICOAGULANTS: ICD-10-CM

## 2018-04-09 DIAGNOSIS — Z95.811 LVAD (LEFT VENTRICULAR ASSIST DEVICE) PRESENT (H): ICD-10-CM

## 2018-04-09 DIAGNOSIS — Z95.811 LVAD (LEFT VENTRICULAR ASSIST DEVICE) PRESENT (H): Primary | ICD-10-CM

## 2018-04-09 DIAGNOSIS — I50.22 CHRONIC SYSTOLIC CONGESTIVE HEART FAILURE (H): ICD-10-CM

## 2018-04-09 LAB — INR BLD: 2.9 (ref 0.86–1.14)

## 2018-04-09 PROCEDURE — 36416 COLLJ CAPILLARY BLOOD SPEC: CPT | Performed by: INTERNAL MEDICINE

## 2018-04-09 PROCEDURE — 85610 PROTHROMBIN TIME: CPT | Mod: QW | Performed by: INTERNAL MEDICINE

## 2018-04-09 RX ORDER — PANTOPRAZOLE SODIUM 40 MG/1
40 TABLET, DELAYED RELEASE ORAL EVERY MORNING
Qty: 30 TABLET | Refills: 1 | Status: SHIPPED | OUTPATIENT
Start: 2018-04-09 | End: 2018-06-06

## 2018-04-09 NOTE — PROGRESS NOTES
ANTICOAGULATION FOLLOW-UP CLINIC VISIT    Patient Name:  Jim Willingham  Date:  4/9/2018  Contact Type:  Telephone    SUBJECTIVE:     Patient Findings     Positives No Problem Findings    Comments Was taking ASA 325mg daily. Will now return to ASA 81 mg daily.            OBJECTIVE    INR Point of Care   Date Value Ref Range Status   04/09/2018 2.9 (H) 0.86 - 1.14 Final     Comment:     This test is intended for monitoring Coumadin therapy.  Results are not   accurate in patients with prolonged INR due to factor deficiency.         ASSESSMENT / PLAN  INR assessment THER    Recheck INR In: 2 DAYS    INR Location Clinic      Anticoagulation Summary as of 4/9/2018     INR goal 2.0-3.0   Today's INR 2.9   Maintenance plan No maintenance plan   Full instructions 4/9: 10 mg; 4/10: 10 mg   Weekly total 62.5 mg   Plan last modified Maru Alonso RN (3/28/2018)   Next INR check 4/11/2018   Priority INR   Target end date     Indications   LVAD (left ventricular assist device) present (H) [Z95.811]  Long-term (current) use of anticoagulants [Z79.01] [Z79.01]         Anticoagulation Episode Summary     INR check location     Preferred lab     Send INR reminders to [facility]    Comments HIPPA Forms mailed 6/26/17  Labs drawn either at [facility] or [facility]      Anticoagulation Care Providers     Provider Role Specialty Phone number    Delisa Montgomery MD Responsible Cardiology [phone]            See the Encounter Report to view Anticoagulation Flowsheet and Dosing Calendar (Go to Encounters tab in chart review, and find the Anticoagulation Therapy Visit)    Spoke with patient. Gave them their lab results and new warfarin recommendation.  No changes in health, medication, or diet. No missed doses, no falls. No signs or symptoms of bleed or clotting. Is returning on Wednesday for other labs so we will also recheck INR at that time.     Tori Heard [facility]

## 2018-04-09 NOTE — MR AVS SNAPSHOT
Jim Willingham   4/9/2018   Anticoagulation Therapy Visit    Description:  62 year old male   Provider:  Tori Heard [facility]   Department:  [facility]           INR as of 4/9/2018     Today's INR 2.9      Anticoagulation Summary as of 4/9/2018     INR goal 2.0-3.0   Today's INR 2.9   Full instructions 4/9: 10 mg; 4/10: 10 mg   Next INR check 4/11/2018    Indications   LVAD (left ventricular assist device) present (H) [Z95.811]  Long-term (current) use of anticoagulants [Z79.01] [Z79.01]         April 2018 Details    Sun Mon Tue Wed Thu Fri Sat     1               2               3               4               5               6               7                 8               9      10 mg   See details      10      10 mg         11            12               13               14                 15               16               17               18               19               20               21                 22               23               24               25               26               27               28                 29               30                     Date Details   04/09 This INR check       Date of next INR:  4/11/2018         How to take your warfarin dose     To take:  10 mg Take 2 of the 5 mg tablets.

## 2018-04-11 ENCOUNTER — HOSPITAL ENCOUNTER (OUTPATIENT)
Dept: CARDIOLOGY | Facility: CLINIC | Age: 63
Discharge: HOME | End: 2018-04-11
Attending: NURSE PRACTITIONER | Admitting: NURSE PRACTITIONER
Payer: COMMERCIAL

## 2018-04-11 ENCOUNTER — ANTICOAGULATION THERAPY VISIT (OUTPATIENT)
Dept: ANTICOAGULATION | Facility: CLINIC | Age: 63
End: 2018-04-11

## 2018-04-11 ENCOUNTER — OFFICE VISIT (OUTPATIENT)
Dept: CARDIOLOGY | Facility: CLINIC | Age: 63
End: 2018-04-11
Attending: NURSE PRACTITIONER
Payer: COMMERCIAL

## 2018-04-11 VITALS
HEART RATE: 78 BPM | WEIGHT: 275 LBS | OXYGEN SATURATION: 95 % | BODY MASS INDEX: 41.68 KG/M2 | SYSTOLIC BLOOD PRESSURE: 86 MMHG | HEIGHT: 68 IN

## 2018-04-11 DIAGNOSIS — I50.22 CHRONIC SYSTOLIC CONGESTIVE HEART FAILURE (H): ICD-10-CM

## 2018-04-11 DIAGNOSIS — Z95.811 LVAD (LEFT VENTRICULAR ASSIST DEVICE) PRESENT (H): Primary | ICD-10-CM

## 2018-04-11 DIAGNOSIS — M10.00 ACUTE IDIOPATHIC GOUT, UNSPECIFIED SITE: ICD-10-CM

## 2018-04-11 DIAGNOSIS — M79.2 NERVE PAIN: ICD-10-CM

## 2018-04-11 DIAGNOSIS — Z79.01 LONG-TERM (CURRENT) USE OF ANTICOAGULANTS: ICD-10-CM

## 2018-04-11 DIAGNOSIS — Z95.811 LVAD (LEFT VENTRICULAR ASSIST DEVICE) PRESENT (H): ICD-10-CM

## 2018-04-11 LAB
ALBUMIN SERPL-MCNC: 3.8 G/DL (ref 3.4–5)
ALP SERPL-CCNC: 108 U/L (ref 40–150)
ALT SERPL W P-5'-P-CCNC: 32 U/L (ref 0–70)
ANION GAP SERPL CALCULATED.3IONS-SCNC: 8 MMOL/L (ref 3–14)
AST SERPL W P-5'-P-CCNC: 22 U/L (ref 0–45)
BILIRUB SERPL-MCNC: 0.4 MG/DL (ref 0.2–1.3)
BUN SERPL-MCNC: 75 MG/DL (ref 7–30)
CALCIUM SERPL-MCNC: 8.6 MG/DL (ref 8.5–10.1)
CHLORIDE SERPL-SCNC: 100 MMOL/L (ref 94–109)
CO2 SERPL-SCNC: 26 MMOL/L (ref 20–32)
CREAT SERPL-MCNC: 1.61 MG/DL (ref 0.66–1.25)
ERYTHROCYTE [DISTWIDTH] IN BLOOD BY AUTOMATED COUNT: 15.5 % (ref 10–15)
GFR SERPL CREATININE-BSD FRML MDRD: 44 ML/MIN/1.7M2
GLUCOSE SERPL-MCNC: 162 MG/DL (ref 70–99)
HCT VFR BLD AUTO: 37.3 % (ref 40–53)
HGB BLD-MCNC: 11.2 G/DL (ref 13.3–17.7)
INR PPP: 2.7 (ref 0.86–1.14)
LDH SERPL L TO P-CCNC: 335 U/L (ref 85–227)
MCH RBC QN AUTO: 24.7 PG (ref 26.5–33)
MCHC RBC AUTO-ENTMCNC: 30 G/DL (ref 31.5–36.5)
MCV RBC AUTO: 82 FL (ref 78–100)
PLATELET # BLD AUTO: 282 10E9/L (ref 150–450)
POTASSIUM SERPL-SCNC: 4.1 MMOL/L (ref 3.4–5.3)
PROT SERPL-MCNC: 7.2 G/DL (ref 6.8–8.8)
RBC # BLD AUTO: 4.54 10E12/L (ref 4.4–5.9)
SODIUM SERPL-SCNC: 134 MMOL/L (ref 133–144)
WBC # BLD AUTO: 9.3 10E9/L (ref 4–11)

## 2018-04-11 PROCEDURE — 83615 LACTATE (LD) (LDH) ENZYME: CPT | Performed by: NURSE PRACTITIONER

## 2018-04-11 PROCEDURE — 93306 TTE W/DOPPLER COMPLETE: CPT | Mod: 26 | Performed by: INTERNAL MEDICINE

## 2018-04-11 PROCEDURE — 93750 INTERROGATION VAD IN PERSON: CPT | Mod: ZF | Performed by: NURSE PRACTITIONER

## 2018-04-11 PROCEDURE — 36415 COLL VENOUS BLD VENIPUNCTURE: CPT | Performed by: NURSE PRACTITIONER

## 2018-04-11 PROCEDURE — 85610 PROTHROMBIN TIME: CPT | Performed by: NURSE PRACTITIONER

## 2018-04-11 PROCEDURE — 93306 TTE W/DOPPLER COMPLETE: CPT

## 2018-04-11 PROCEDURE — 99214 OFFICE O/P EST MOD 30 MIN: CPT | Mod: 25 | Performed by: NURSE PRACTITIONER

## 2018-04-11 PROCEDURE — 85027 COMPLETE CBC AUTOMATED: CPT | Performed by: NURSE PRACTITIONER

## 2018-04-11 PROCEDURE — 80053 COMPREHEN METABOLIC PANEL: CPT | Performed by: NURSE PRACTITIONER

## 2018-04-11 PROCEDURE — G0463 HOSPITAL OUTPT CLINIC VISIT: HCPCS | Mod: 25,ZF

## 2018-04-11 RX ORDER — SPIRONOLACTONE 25 MG/1
25 TABLET ORAL 2 TIMES DAILY
Qty: 30 TABLET | Refills: 5 | COMMUNITY
Start: 2018-04-11 | End: 2018-04-20

## 2018-04-11 RX ORDER — GABAPENTIN 300 MG/1
300 CAPSULE ORAL 3 TIMES DAILY
Qty: 90 CAPSULE | COMMUNITY
Start: 2018-04-11 | End: 2020-01-16

## 2018-04-11 RX ORDER — PREDNISONE 20 MG/1
10 TABLET ORAL DAILY
Qty: 20 TABLET | Refills: 0 | Status: ON HOLD | COMMUNITY
Start: 2018-04-11 | End: 2019-07-24

## 2018-04-11 ASSESSMENT — PAIN SCALES - GENERAL: PAINLEVEL: NO PAIN (0)

## 2018-04-11 NOTE — PROGRESS NOTES
ANTICOAGULATION FOLLOW-UP CLINIC VISIT    Patient Name:  Jim Willingham  Date:  4/11/2018  Contact Type:  Telephone    SUBJECTIVE:        OBJECTIVE    INR   Date Value Ref Range Status   04/11/2018 2.70 (H) 0.86 - 1.14 Final       ASSESSMENT / PLAN  INR assessment THER    Recheck INR In: 1 WEEK    INR Location Clinic      Anticoagulation Summary as of 4/11/2018     INR goal 2.0-3.0   Today's INR 2.70   Maintenance plan No maintenance plan   Full instructions 4/11: 10 mg; 4/12: 10 mg; 4/13: 10 mg; 4/14: 10 mg; 4/15: 10 mg; 4/16: 10 mg; 4/17: 10 mg   Weekly total 62.5 mg   Plan last modified Maru Alonso RN (3/28/2018)   Next INR check 4/18/2018   Priority INR   Target end date     Indications   LVAD (left ventricular assist device) present (H) [Z95.811]  Long-term (current) use of anticoagulants [Z79.01] [Z79.01]         Anticoagulation Episode Summary     INR check location     Preferred lab     Send INR reminders to [facility]    Comments HIPPA Forms mailed 6/26/17  Labs drawn either at [facility] or [facility]      Anticoagulation Care Providers     Provider Role Specialty Phone number    Delisa Montgomery MD Responsible Cardiology [phone]            See the Encounter Report to view Anticoagulation Flowsheet and Dosing Calendar (Go to Encounters tab in chart review, and find the Anticoagulation Therapy Visit)  Left message for patient with results and dosing recommendations. Asked patient to call back to report any missed doses, falls, signs and symptoms of bleeding or clotting, any changes in health, medication, or diet. Asked patient to call back with any questions or concerns.      Delisa Hillman RN

## 2018-04-11 NOTE — NURSING NOTE
1). PUMP DATA  Primary controller serial number: [id number]     II:   Flow: 6.2 L/min,    Speed: 9600 RPMs,     PI: 4.4 ,  Power: 6.5 Manuel,      Primary controller   Back up battery: Patient use: 51, Replace in: 20  Months     Data downloaded: No   Equipment and driveline assessed for damage: Yes     Back up : Serial number: [id number]  Back up battery: Patient use: 4 Replace in: 22  Months  Programmed settings identical to the settings on the primary controller :Yes      Education complete: Yes   Charge the BACKUP controller s backup battery every 6 months  Perform a self test on BACKUP every 6 months  Change the MPU s batteries every 6 months:Yes  Have equipment serviced yearly (if applicable):Yes      2). ALARMS  Alarms reported by patient since last pump evaluation: No  Alarms or other finding noted during pump data history and alarm download: Yes  Frequent PI events.  PI events 3-5.  No speed drops.    Action Taken:  Reviewed data with patient: Yes      3). DRESSING CHANGE / DRIVELINE ASSESSMENT  Dressing change completed today: No  Appearance of Driveline site: Per pt - C, D, I    Driveline stabilization: Method: [employer]  [ Teaching reinforced on need for stabilization of Driveline. ]

## 2018-04-11 NOTE — MR AVS SNAPSHOT
After Visit Summary   4/11/2018    Jim Willingham    MRN: [id number]           Patient Information     Date Of Birth          1955        Visit Information        Provider Department      4/11/2018 11:00 AM Soraya Marshall APRN [facility]        Today's Diagnoses     LVAD (left ventricular assist device) present (H)    -  1    Nerve pain        Acute idiopathic gout, unspecified site        Chronic systolic congestive heart failure (H)          Care Instructions    Medications:  1.  No medication changes.    Follow-up:  1.  Cancel appointment on 4/16 with Cate.  2.  Make an appointment with one of the NPs and labs in 3 weeks.    Instructions:  1. Have a BMP and INR done on Friday.    Page the VAD Coordinator on call if you gain more than 3 lb in a day or 5 in a week. Please also page if you feel unwell or have alarms.     Great to see you in clinic today. To Page the VAD Coordinator on call, dial [phone] option #[phone] and ask to speak to the VAD coordinator on call.               Follow-ups after your visit        Your next 10 appointments already scheduled     May 04, 2018  9:30 AM CDT   Lab with UC LAB   [facility] ([facility])    [address]  1st Floor  [facility] [address] [phone]            May 04, 2018 10:00 AM CDT   (Arrive by 9:45 AM)   Ventricular Assist Device with Jeanine Wilson NP   [facility] ([facility])    [address]  [address] [place] [id number] [phone]            Jul 06, 2018  8:00 AM CDT   LAB with UU LAB [facility] ([facility])    [address] [address]              Please do not eat 10-12 hours before your appointment if you are coming in fasting for labs on lipids, cholesterol, or glucose (sugar). This does not apply to pregnant women. Water, hot tea and black  coffee (with nothing added) are okay. Do not drink other fluids, diet soda or chew gum.            Jul 06, 2018  8:30 AM CDT   Ech Complete Lvad* with UUECHR2   [facility] ([facility])    [address] [address]   [phone]           1.  Please bring or wear a comfortable two-piece outfit. 2.  You may eat, drink and take your normal medicines. 3.  For any questions that cannot be answered, please contact the ordering physician            Jul 06, 2018  9:30 AM CDT   Procedure - 2.5 hour with U2A ROOM 17   [address] [facility] ([facility])    [address] [address]               Jul 06, 2018 10:30 AM CDT   Heart Cath Right Heart Cath with UUHCVR3   [facility] ([facility])    [address] [id number] [phone]            Jul 06, 2018  2:00 PM CDT   (Arrive by 1:45 PM)   Implanted Defibulator with Uc Cv Device 1   [facility] ([facility])    [address]  [address] [place] [id number] [phone]            Jul 06, 2018  2:30 PM CDT   (Arrive by 2:15 PM)   Ventricular Assist Device with Delisa Montgomery MD   [facility] ([facility])    [address]  [address] [place] [id number] [phone]              Who to contact     If you have questions or need follow up information about today's clinic visit or your schedule please contact [facility] directly at [phone].  Normal or non-critical lab and imaging results will be communicated to you by MyChart, letter or phone within 4 business days after the clinic has received the results. If you do not hear from us within 7 days, please contact the clinic through MyChart or phone. If you have a critical or abnormal lab result, we will  "notify you by phone as soon as possible.  Submit refill requests through [employer] or call your pharmacy and they will forward the refill request to us. Please allow 3 business days for your refill to be completed.          Additional Information About Your Visit        [employer] Information     [employer] lets you send messages to your doctor, view your test results, renew your prescriptions, schedule appointments and more. To sign up, go to [email or web address] . Click on \"Log in\" on the left side of the screen, which will take you to the Welcome page. Then click on \"Sign up Now\" on the right side of the page.     You will be asked to enter the access code listed below, as well as some personal information. Please follow the directions to create your username and password.     Your access code is: [email or web address]  Expires: 2018  6:30 AM     Your access code will  in 90 days. If you need help or a new code, please call your [place] clinic or [phone].        Care EveryWhere ID     This is your Care EveryWhere ID. This could be used by other organizations to access your [place] medical records  [id number]        Your Vitals Were     Pulse Height Pulse Oximetry BMI (Body Mass Index)          78 1.727 m (5' 8\") 95% 41.81 kg/m2         Blood Pressure from Last 3 Encounters:   18 (!) 86/0   18 (!) 80/0   17 (!) 86/0    Weight from Last 3 Encounters:   18 124.7 kg (275 lb)   18 125.7 kg (277 lb 1.6 oz)   17 119.9 kg (264 lb 4.8 oz)              We Performed the Following     (68883) INTERROGATE VENT ASSIST DEVICE, IN PERSON, DAGO JETER ANALYSIS PARAMETERS          Today's Medication Changes          These changes are accurate as of 18 11:41 AM.  If you have any questions, ask your nurse or doctor.               These medicines have changed or have updated prescriptions.        Dose/Directions    ALDACTONE 25 MG tablet   This may have changed:  when to take this   Used for:  " LVAD (left ventricular assist device) present (H), Chronic systolic congestive heart failure (H)   Generic drug:  spironolactone   Changed by:  Soraya Marshall APRN CNP        Dose:  25 mg   Take 1 tablet (25 mg) by mouth 2 times daily   Quantity:  30 tablet   Refills:  5       gabapentin 300 MG capsule   Commonly known as:  NEURONTIN   This may have changed:  when to take this   Used for:  Nerve pain   Changed by:  Soraya Marshall APRN CNP        Dose:  300 mg   Take 1 capsule (300 mg) by mouth 3 times daily   Quantity:  90 capsule   Refills:  0       predniSONE 20 MG tablet   Commonly known as:  DELTASONE   This may have changed:  how much to take   Used for:  Acute idiopathic gout, unspecified site   Changed by:  Soraya Marshall APRN CNP        Dose:  10 mg   Take 0.5 tablets (10 mg) by mouth daily   Quantity:  20 tablet   Refills:  0         Stop taking these medicines if you haven't already. Please contact your care team if you have questions.     COLCHICINE PO   Stopped by:  Soraya Marshall APRN CNP           HYDROmorphone 2 MG tablet   Commonly known as:  DILAUDID   Stopped by:  Soraya Marshall APRN CNP                    Primary Care Provider Office Phone # Fax #    Jovany Salas [phone]       [place] [address]        Equal Access to Services     [facility]: Hadii andrea chopra hadasho Sobrandon, waaxda luqadaha, qaybta kaalmada lynnetteyada, kendra marquez . So [facility] [phone].    ATENCIÓN: Si habla español, tiene a roger disposición servicios gratuitos de asistencia lingüística. Guerline al [phone].    We comply with applicable federal civil rights laws and [place] laws. We do not discriminate on the basis of race, color, national origin, age, disability, sex, sexual orientation, or gender identity.            Thank you!     Thank you for choosing [facility]  for your care. Our goal is  always to provide you with excellent care. Hearing back from our patients is one way we can continue to improve our services. Please take a few minutes to complete the written survey that you may receive in the mail after your visit with us. Thank you!             Your Updated Medication List - Protect others around you: Learn how to safely use, store and throw away your medicines at www.disposemymeds.org.          This list is accurate as of 4/11/18 11:41 AM.  Always use your most recent med list.                   Brand Name Dispense Instructions for use Diagnosis    acetaminophen 325 MG tablet    TYLENOL    100 tablet    Take 2 tablets (650 mg) by mouth every 6 hours    Acute post-operative pain       ALDACTONE 25 MG tablet   Generic drug:  spironolactone     30 tablet    Take 1 tablet (25 mg) by mouth 2 times daily    LVAD (left ventricular assist device) present (H), Chronic systolic congestive heart failure (H)       allopurinol 100 MG tablet    ZYLOPRIM    30 tablet    Take 1 tablet (100 mg) by mouth daily    Acute idiopathic gout, unspecified site       aspirin 81 MG chewable tablet     36 tablet    1 tablet (81 mg) by Oral or Feeding Tube route daily    LVAD (left ventricular assist device) present (H)       BREO ELLIPTA 200-25 MCG/INH oral inhaler   Generic drug:  fluticasone-vilanterol      Inhale 1 puff into the lungs daily        bumetanide 1 MG tablet    BUMEX    180 tablet    Take 2 mg daily for the next 5 days then decrease back to 2 mg alternating with 1 mg daily every other day.    LVAD (left ventricular assist device) present (H)       celeXA 20 MG tablet   Generic drug:  citalopram      Take 20 mg by mouth daily        cyanocobalamin 1000 MCG/ML injection    VITAMIN B12     Inject 1,000 mcg into the muscle every 30 days        enoxaparin 120 MG/0.8ML injection    LOVENOX    10 Syringe    Please inject entire contents of Syringe, 120mg of Lovenox subcutaneously every twelve hours daily.    LVAD  (left ventricular assist device) present (H), Long-term (current) use of anticoagulants       gabapentin 300 MG capsule    NEURONTIN    90 capsule    Take 1 capsule (300 mg) by mouth 3 times daily    Nerve pain       INCRUSE ELLIPTA 62.5 MCG/INH oral inhaler   Generic drug:  umeclidinium      Inhale 1 puff into the lungs daily        ipratropium - albuterol 0.5 mg/2.5 mg/3 mL 0.5-2.5 (3) MG/3ML neb solution    DUONEB     Take 3 mLs by nebulization every 4 hours as needed for shortness of breath / dyspnea or wheezing        losartan 25 MG tablet    COZAAR    90 tablet    Take 1 tablet (25 mg) by mouth daily    LVAD (left ventricular assist device) present (H), Chronic systolic congestive heart failure (H)       metFORMIN 500 MG tablet    GLUCOPHAGE    60 tablet    Take 0.5 tablets (250 mg) by mouth 2 times daily (with meals)    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, without long-term current use of insulin, unspecified CKD stage (H)       metoprolol succinate 25 MG 24 hr tablet    TOPROL-XL    180 tablet    Take 1 tablet (25 mg) by mouth 2 times daily    Chronic systolic congestive heart failure (H), LVAD (left ventricular assist device) present (H)       pantoprazole 40 MG EC tablet    PROTONIX    30 tablet    Take 1 tablet (40 mg) by mouth every morning    Gastroesophageal reflux disease without esophagitis       polyethylene glycol Packet    MIRALAX/GLYCOLAX    7 packet    Take 17 g by mouth daily as needed for constipation (for no bm in 1 day)    Acute post-operative pain       predniSONE 20 MG tablet    DELTASONE    20 tablet    Take 0.5 tablets (10 mg) by mouth daily    Acute idiopathic gout, unspecified site       PROAIR  (90 Base) MCG/ACT Inhaler   Generic drug:  albuterol      Inhale 2 puffs into the lungs every 4 hours as needed for shortness of breath / dyspnea or wheezing        senna-docusate 8.6-50 MG per tablet    SENOKOT-S;PERICOLACE    100 tablet    Take 2  tablets by mouth 2 times daily as needed for constipation    Acute post-operative pain       SINGULAIR 10 MG tablet   Generic drug:  montelukast      Take 10 mg by mouth At Bedtime        traMADol 50 MG tablet    ULTRAM    28 tablet    Take 2 tablets (100 mg) by mouth every 6 hours as needed for moderate pain or breakthrough pain    Acute post-operative pain       traZODone 50 MG tablet    DESYREL    11 tablet    Take 50 mg by mouth nightly x one week then reduce to 25 mg (1/2 tab) by mouth nightly x one week then stop.    Acute post-operative pain, LVAD (left ventricular assist device) present (H)       * warfarin 3 MG tablet    COUMADIN    30 tablet    Use as directed.    LVAD (left ventricular assist device) present (H)       * warfarin 5 MG tablet    COUMADIN    60 tablet    Take 2 tablets (10 mg) by mouth daily    LVAD (left ventricular assist device) present (H), Chronic systolic congestive heart failure (H)       zinc sulfate 220 (50 Zn) MG capsule    ZINCATE     Take 220 mg by mouth 2 times daily        * Notice:  This list has 2 medication(s) that are the same as other medications prescribed for you. Read the directions carefully, and ask your doctor or other care provider to review them with you.

## 2018-04-11 NOTE — PROGRESS NOTES
HPI:   Mr. Willingham is a 62 year old male with a past medical history including CKD Stage III, DM Type II, CECILIA, HTN, COPD, Asthma, and NICM with EF 20% s/p LVAD HM II 6/16/17. His postoperative course was complicated by arrhythmias, WALTER, and left small pleural effusions. He presents to clinic for routine follow up.     He notes abdominal distention and mild SOB, but has loss 5-6 lbs since the increase in his Bumex dose to 2 mg in AM and 1 mg at HS. He notes rare lightheadedness and dizziness, but states this is a chronic issue for him. He was sleeping on a wedge pillow prior to change in his denies lightheadedness, dizziness, changes in speech, fever, chills, chest pain, SOB, RESENDIZ, PND, cough, nausea, vomiting, diarrhea, melena, hematochezia, and LE edema. He denies any concerns at his LVAD drive line site. His weight remains stable at 259 lbs down from 265 lbs with a good dry weight around 250 lbs. He continues to maintain a low sodium diet.     VAD Interrogation on 4/11/2018: VAD interrogation reviewed with VAD coordinator. Agree with findings. No power spikes, speed drops, or other findings suspicious of pump malfunction noted. PI events, but lowest PI 3.4.      PAST MEDICAL HISTORY:  Past Medical History:   Diagnosis Date     Benign essential hypertension 5/11/2017     Cardiomyopathy, unspecified 5/8/2017     CKD (chronic kidney disease) stage 3, GFR 30-59 ml/min 5/11/2017     Depression 5/11/2017     Diabetes mellitus (H) 5/11/2017     H/O gastric bypass 5/11/2017     ICD (implantable cardioverter-defibrillator), biventricular, in situ 5/11/2017     NICM (nonischemic cardiomyopathy) (H)/ EF 20% 5/11/2017    ECHO: LVEDd. 7.66 cm, Restrictive pattern , Severe mitral valve regurgitation     CECILIA (obstructive sleep apnea) 5/11/2017     Paroxysmal atrial fibrillation (H) 5/11/2017     Paroxysmal VT (H) 5/11/2017     Vitamin B12 deficiency (non anemic) 5/11/2017       FAMILY HISTORY:  Family History   Problem Relation Age  of Onset     CEREBROVASCULAR DISEASE Mother      DIABETES Mother      Hypertension Mother      Coronary Artery Disease Father      Type 2 Diabetes Father        SOCIAL HISTORY:  Social History     Social History     Marital status:      Spouse name: N/A     Number of children: N/A     Years of education: N/A     Social History Main Topics     Smoking status: Former Smoker     Quit date: 1995     Smokeless tobacco: Not on file     Alcohol use No     Drug use: Not on file     Sexual activity: Yes     Partners: Female     Other Topics Concern     Parent/Sibling W/ Cabg, Mi Or Angioplasty Before 65f 55m? No     Social History Narrative       CURRENT MEDICATIONS:  Outpatient Medications Prior to Visit   Medication Sig Dispense Refill     pantoprazole (PROTONIX) 40 MG EC tablet Take 1 tablet (40 mg) by mouth every morning 30 tablet 1     gabapentin (NEURONTIN) 300 MG capsule Take 1 capsule (300 mg) by mouth 2 times daily 90 capsule      bumetanide (BUMEX) 1 MG tablet Take 2 mg daily for the next 5 days then decrease back to 2 mg alternating with 1 mg daily every other day. 180 tablet 3     losartan (COZAAR) 25 MG tablet Take 1 tablet (25 mg) by mouth daily 90 tablet 3     allopurinol (ZYLOPRIM) 100 MG tablet Take 1 tablet (100 mg) by mouth daily 30 tablet 3     warfarin (COUMADIN) 5 MG tablet Take 2 tablets (10 mg) by mouth daily 60 tablet 5     spironolactone (ALDACTONE) 25 MG tablet Take 1 tablet (25 mg) by mouth daily 30 tablet 5     predniSONE (DELTASONE) 20 MG tablet Take 1 tablet (20 mg) by mouth daily 20 tablet 0     metoprolol (TOPROL-XL) 25 MG 24 hr tablet Take 1 tablet (25 mg) by mouth 2 times daily 180 tablet 3     aspirin 81 MG chewable tablet 1 tablet (81 mg) by Oral or Feeding Tube route daily 36 tablet      metFORMIN (GLUCOPHAGE) 500 MG tablet Take 0.5 tablets (250 mg) by mouth 2 times daily (with meals) 60 tablet 0     senna-docusate (SENOKOT-S;PERICOLACE) 8.6-50 MG per tablet Take 2 tablets by  mouth 2 times daily as needed for constipation 100 tablet 0     warfarin (COUMADIN) 3 MG tablet Use as directed. 30 tablet 0     traMADol (ULTRAM) 50 MG tablet Take 2 tablets (100 mg) by mouth every 6 hours as needed for moderate pain or breakthrough pain 28 tablet      acetaminophen (TYLENOL) 325 MG tablet Take 2 tablets (650 mg) by mouth every 6 hours 100 tablet      polyethylene glycol (MIRALAX/GLYCOLAX) Packet Take 17 g by mouth daily as needed for constipation (for no bm in 1 day) 7 packet      albuterol (ALBUTEROL) 108 (90 BASE) MCG/ACT Inhaler Inhale 2 puffs into the lungs every 4 hours as needed for shortness of breath / dyspnea or wheezing       ipratropium - albuterol 0.5 mg/2.5 mg/3 mL (DUONEB) 0.5-2.5 (3) MG/3ML neb solution Take 3 mLs by nebulization every 4 hours as needed for shortness of breath / dyspnea or wheezing       citalopram (CELEXA) 20 MG tablet Take 20 mg by mouth daily       cyanocobalamin (VITAMIN B12) 1000 MCG/ML injection Inject 1,000 mcg into the muscle every 30 days       fluticasone-vilanterol (BREO ELLIPTA) 200-25 MCG/INH oral inhaler Inhale 1 puff into the lungs daily       montelukast (SINGULAIR) 10 MG tablet Take 10 mg by mouth At Bedtime       umeclidinium (INCRUSE ELLIPTA) 62.5 MCG/INH oral inhaler Inhale 1 puff into the lungs daily       zinc sulfate (ZINCATE) 220 (50 ZN) MG capsule Take 220 mg by mouth 2 times daily       enoxaparin (LOVENOX) 120 MG/0.8ML injection Please inject entire contents of Syringe, 120mg of Lovenox subcutaneously every twelve hours daily. (Patient not taking: Reported on 3/19/2018) 10 Syringe 1     COLCHICINE PO Take by mouth daily       traZODone (DESYREL) 50 MG tablet Take 50 mg by mouth nightly x one week then reduce to 25 mg (1/2 tab) by mouth nightly x one week then stop. (Patient not taking: Reported on 8/11/2017) 11 tablet 0     HYDROmorphone (DILAUDID) 2 MG tablet Take 1 tablet (2 mg) by mouth every 4 hours as needed for moderate to severe  "pain (Patient not taking: Reported on 7/25/2017) 45 tablet 0     No facility-administered medications prior to visit.        ROS:   CONSTITUTIONAL: Denies fever, chills, and fatigue. He notes weight loss with change in diuretics.   HEENT: Denies headache, vision changes, and changes in speech.   CV: Refer to HPI.   PULMONARY:Denies shortness of breath, cough, or previous TB exposure.   GI:Denies nausea, vomiting, diarrhea, and abdominal pain. Bowel movements are regular. Complains of abdominal distention.   :Denies urinary alterations, dysuria, urinary frequency, hematuria, and abnormal drainage.   EXT:Denies lower extremity edema.   SKIN:Denies abnormal rashes or lesions.   MUSCULOSKELETAL:Denies upper or lower extremity weakness and pain.   NEUROLOGIC:Denies lightheadedness, dizziness, seizures, or upper or lower extremity paresthesia.     EXAM:  BP (!) 86/0 (BP Location: Left arm, Patient Position: Chair, Cuff Size: Adult Large)  Pulse 78  Ht 1.727 m (5' 8\")  Wt 124.7 kg (275 lb)  SpO2 95%  BMI 41.81 kg/m2  GENERAL: Appears alert and oriented times three.   HEENT: Eye symmetrical and free of discharge bilaterally. Mucous membranes moist and without lesions.  NECK: Supple and without lymphadenopathy. JVD mid neck upright.   CV: S1S2 present with LVAD hum.   RESPIRATORY: Respirations regular, even, and unlabored. Lungs CTA throughout.   GI: Soft and distended with normoactive bowel sounds present in all quadrants. No tenderness, rebound, guarding. No organomegaly.   EXTREMITIES: Trace bilateral LE edema. 2+ bilateral pedal pulses.   NEUROLOGIC: Alert and orientated x 3. CN II-XII grossly intact. No focal deficits.   MUSCULOSKELETAL: No joint swelling or tenderness.   SKIN: No jaundice. No rashes or lesions. LVAD drive line covered with dressing CDI.     Diagnostics:  Echo 4/11/18:  Interpretation Summary  HMII 9600 RPM  Normal inflow and outflow velocities. Left ventricular wall thickness is  normal. " Moderate left ventricular dilation is present. The Ejection Fraction  is estimated at 5-10%. Severe diffuse hypokinesis is present.  Mild to moderate right ventricular dilation is present. Global right  ventricular function is moderately to severely reduced.  The aortic valve opens partially with each systole.  There is a mobile echodensity on the right coronary cusp measuring 0.78 cm x  0.29 cm. This echodensity is more prominent when compared to prior study.Could  be aortic valve sclerosis,but cannot rule out other pathology.    Echo 9/15/17:  Interpretation Summary  HM2 9600 RPM  Normal inflow and outflow velocities.  Aortic valve appear to open partially every other beat.  Severely (EF <30%) reduced left ventricular function is present. The Ejection  Fraction is estimated at 15-20%. Severe diffuse hypokinesis is present. Mild  MR.  Severe left ventricular dilation is present (LVIDd: 6.9 cm).  Global right ventricular function is moderately reduced.Mild to moderate right  ventricular dilation is present.  The right ventricular systolic pressure is approximated at 28.1 mmHg plus the  right atrial pressure.  Dilation of the inferior vena cava is present with normal respiratory  variation in diameter. Estimated mean right atrial pressure is 8 mmHg.  No pericardial effusion is present.  Compared to previous study dated 6/23/2017, there has been no significant  Change.    RHC 6/22/17:  mRA-7  RV-50/10  mPA-39  mPCW-29  MICHELLE CO-5.9  MICHELLE CI-2.6    Labs:  CBC RESULTS:  Lab Results   Component Value Date    WBC 9.3 04/11/2018    RBC 4.54 04/11/2018    HGB 11.2 (L) 04/11/2018    HCT 37.3 (L) 04/11/2018    MCV 82 04/11/2018    MCH 24.7 (L) 04/11/2018    MCHC 30.0 (L) 04/11/2018    RDW 15.5 (H) 04/11/2018     04/11/2018       CMP RESULTS:  Lab Results   Component Value Date     04/11/2018    POTASSIUM 4.1 04/11/2018    CHLORIDE 100 04/11/2018    CO2 26 04/11/2018    ANIONGAP 8 04/11/2018     (H)  04/11/2018    BUN 75 (H) 04/11/2018    CR 1.61 (H) 04/11/2018    GFRESTIMATED 44 (L) 04/11/2018    GFRESTBLACK 53 (L) 04/11/2018    QAMAR 8.6 04/11/2018    BILITOTAL 0.4 04/11/2018    ALBUMIN 3.8 04/11/2018    ALKPHOS 108 04/11/2018    ALT 32 04/11/2018    AST 22 04/11/2018        INR RESULTS:  Lab Results   Component Value Date    INR 2.70 (H) 04/11/2018       Lab Results   Component Value Date    MAG 2.1 06/30/2017     Lab Results   Component Value Date    NTBNPI 4216 (H) 06/15/2017     Lab Results   Component Value Date    NTBNP 1119 (H) 12/19/2017       Assessment and Plan:   Mr. Willingham is a 62 year old male with a past medical history including CKD Stage III, DM Type II, CECILIA, HTN, COPD, Asthma, and NICM with EF 20% s/p LVAD HM II 6/16/17. His postoperative course was complicated by arrhythmias, WALTER, and left small pleural effusions. He presents to clinic for routine follow up and remains hypervolemic.     Chronic systolic heart failure secondary to NICM s/p LVAD HM II. Implanted 6/16/17.   Stage D, NYHA Class III  ACEi/ARB Losartan 25 mg po BID   BB Toprol XL 25 mg po BID   Aldosterone antagonist Aldactone 25 mg po BID   SCD prophylaxis CRT-D  % BiV pacing: AP 1.3%, BVP 85.2%, and VSR pacing 7.3% per interrogation 12/19/17.  Fluid status: Hypervolemic. Continue Bumex 2 mg in AM and 1 mg in the evening. Repeat BMP Friday.   Sleep Apnea Evaluation: CPAP  MAP: 86. If BMP remains stable on Friday increase Losartan to 50 mg po daily.   LDH: 335.  Anticoagulation: Coumadin per AC. INR-2.7. Goal INR 2-3.  Antiplatelet: ASA 81 mg po daily     AV mass. Echo 4/11/18 consistent with increased echodensity to right coronary cusp.   - Will discuss with Dr. Montgomery and compare to prior echo to determine need for CHUN.   - Continue Coumadin as above.     CKD Stage III.   - Cr 1.6 today, attribute mild bump to Aldactone. Repeat Friday given continued diuresis.     HTN  - MAP-86. Plan to increase Losartan on Friday pending repeat  BMP. If Cr rises increase Toprol XL.     COPD and Asthma.   - Management per Pulmonary.     CECILIA.   - CPAP.     Follow up BMP Friday and in clinic in 3 weeks.     Soraya Marshall  4/10/2018          CC  [facility]

## 2018-04-11 NOTE — PATIENT INSTRUCTIONS
Medications:  1.  No medication changes.    Follow-up:  1.  Cancel appointment on 4/16 with Cate.  2.  Make an appointment with one of the NPs and labs in 3 weeks.    Instructions:  1. Have a BMP and INR done on Friday.    Page the VAD Coordinator on call if you gain more than 3 lb in a day or 5 in a week. Please also page if you feel unwell or have alarms.     Great to see you in clinic today. To Page the VAD Coordinator on call, dial [phone] option #4 and ask to speak to the VAD coordinator on call.

## 2018-04-11 NOTE — MR AVS SNAPSHOT
Jim Willingham   4/11/2018   Anticoagulation Therapy Visit    Description:  62 year old male   Provider:  Delisa Hillman RN   Department:  [facility]           INR as of 4/11/2018     Today's INR 2.70      Anticoagulation Summary as of 4/11/2018     INR goal 2.0-3.0   Today's INR 2.70   Full instructions 4/11: 10 mg; 4/12: 10 mg; 4/13: 10 mg; 4/14: 10 mg; 4/15: 10 mg; 4/16: 10 mg; 4/17: 10 mg   Next INR check 4/18/2018    Indications   LVAD (left ventricular assist device) present (H) [Z95.811]  Long-term (current) use of anticoagulants [Z79.01] [Z79.01]         April 2018 Details    Sun Mon Tue Wed Thu Fri Sat     1               2               3               4               5               6               7                 8               9               10               11      10 mg   See details      12      10 mg         13      10 mg         14      10 mg           15      10 mg         16      10 mg         17      10 mg         18            19               20               21                 22               23               24               25               26               27               28                 29               30                     Date Details   04/11 This INR check       Date of next INR:  4/18/2018         How to take your warfarin dose     To take:  10 mg Take 2 of the 5 mg tablets.

## 2018-04-11 NOTE — LETTER
4/11/2018      RE: Jim Willingham  [address] [place] [id number]       Dear Colleague,    Thank you for the opportunity to participate in the care of your patient, Jim Willingham, at the [facility] at [facility]. Please see a copy of my visit note below.    HPI:   Mr. Willingham is a 62 year old male with a past medical history including CKD Stage III, DM Type II, CECILIA, HTN, COPD, Asthma, and NICM with EF 20% s/p LVAD HM II 6/16/17. His postoperative course was complicated by arrhythmias, WALTER, and left small pleural effusions. He presents to clinic for routine follow up.     He notes abdominal distention and mild SOB, but has loss 5-6 lbs since the increase in his Bumex dose to 2 mg in AM and 1 mg at HS. He notes rare lightheadedness and dizziness, but states this is a chronic issue for him. He was sleeping on a wedge pillow prior to change in his denies lightheadedness, dizziness, changes in speech, fever, chills, chest pain, SOB, RESENDIZ, PND, cough, nausea, vomiting, diarrhea, melena, hematochezia, and LE edema. He denies any concerns at his LVAD drive line site. His weight remains stable at 259 lbs down from 265 lbs with a good dry weight around 250 lbs. He continues to maintain a low sodium diet.     VAD Interrogation on 4/11/2018: VAD interrogation reviewed with VAD coordinator. Agree with findings. No power spikes, speed drops, or other findings suspicious of pump malfunction noted. PI events, but lowest PI 3.4.      PAST MEDICAL HISTORY:  Past Medical History:   Diagnosis Date     Benign essential hypertension 5/11/2017     Cardiomyopathy, unspecified 5/8/2017     CKD (chronic kidney disease) stage 3, GFR 30-59 ml/min 5/11/2017     Depression 5/11/2017     Diabetes mellitus (H) 5/11/2017     H/O gastric bypass 5/11/2017     ICD (implantable cardioverter-defibrillator), biventricular, in situ 5/11/2017     NICM (nonischemic cardiomyopathy) (H)/ EF 20% 5/11/2017     ECHO: LVEDd. 7.66 cm, Restrictive pattern , Severe mitral valve regurgitation     CECILIA (obstructive sleep apnea) 5/11/2017     Paroxysmal atrial fibrillation (H) 5/11/2017     Paroxysmal VT (H) 5/11/2017     Vitamin B12 deficiency (non anemic) 5/11/2017       FAMILY HISTORY:  Family History   Problem Relation Age of Onset     CEREBROVASCULAR DISEASE Mother      DIABETES Mother      Hypertension Mother      Coronary Artery Disease Father      Type 2 Diabetes Father        SOCIAL HISTORY:  Social History     Social History     Marital status:      Spouse name: N/A     Number of children: N/A     Years of education: N/A     Social History Main Topics     Smoking status: Former Smoker     Quit date: 1995     Smokeless tobacco: Not on file     Alcohol use No     Drug use: Not on file     Sexual activity: Yes     Partners: Female     Other Topics Concern     Parent/Sibling W/ Cabg, Mi Or Angioplasty Before 65f 55m? No     Social History Narrative       CURRENT MEDICATIONS:  Outpatient Medications Prior to Visit   Medication Sig Dispense Refill     pantoprazole (PROTONIX) 40 MG EC tablet Take 1 tablet (40 mg) by mouth every morning 30 tablet 1     gabapentin (NEURONTIN) 300 MG capsule Take 1 capsule (300 mg) by mouth 2 times daily 90 capsule      bumetanide (BUMEX) 1 MG tablet Take 2 mg daily for the next 5 days then decrease back to 2 mg alternating with 1 mg daily every other day. 180 tablet 3     losartan (COZAAR) 25 MG tablet Take 1 tablet (25 mg) by mouth daily 90 tablet 3     allopurinol (ZYLOPRIM) 100 MG tablet Take 1 tablet (100 mg) by mouth daily 30 tablet 3     warfarin (COUMADIN) 5 MG tablet Take 2 tablets (10 mg) by mouth daily 60 tablet 5     spironolactone (ALDACTONE) 25 MG tablet Take 1 tablet (25 mg) by mouth daily 30 tablet 5     predniSONE (DELTASONE) 20 MG tablet Take 1 tablet (20 mg) by mouth daily 20 tablet 0     metoprolol (TOPROL-XL) 25 MG 24 hr tablet Take 1 tablet (25 mg) by mouth 2 times  daily 180 tablet 3     aspirin 81 MG chewable tablet 1 tablet (81 mg) by Oral or Feeding Tube route daily 36 tablet      metFORMIN (GLUCOPHAGE) 500 MG tablet Take 0.5 tablets (250 mg) by mouth 2 times daily (with meals) 60 tablet 0     senna-docusate (SENOKOT-S;PERICOLACE) 8.6-50 MG per tablet Take 2 tablets by mouth 2 times daily as needed for constipation 100 tablet 0     warfarin (COUMADIN) 3 MG tablet Use as directed. 30 tablet 0     traMADol (ULTRAM) 50 MG tablet Take 2 tablets (100 mg) by mouth every 6 hours as needed for moderate pain or breakthrough pain 28 tablet      acetaminophen (TYLENOL) 325 MG tablet Take 2 tablets (650 mg) by mouth every 6 hours 100 tablet      polyethylene glycol (MIRALAX/GLYCOLAX) Packet Take 17 g by mouth daily as needed for constipation (for no bm in 1 day) 7 packet      albuterol (ALBUTEROL) 108 (90 BASE) MCG/ACT Inhaler Inhale 2 puffs into the lungs every 4 hours as needed for shortness of breath / dyspnea or wheezing       ipratropium - albuterol 0.5 mg/2.5 mg/3 mL (DUONEB) 0.5-2.5 (3) MG/3ML neb solution Take 3 mLs by nebulization every 4 hours as needed for shortness of breath / dyspnea or wheezing       citalopram (CELEXA) 20 MG tablet Take 20 mg by mouth daily       cyanocobalamin (VITAMIN B12) 1000 MCG/ML injection Inject 1,000 mcg into the muscle every 30 days       fluticasone-vilanterol (BREO ELLIPTA) 200-25 MCG/INH oral inhaler Inhale 1 puff into the lungs daily       montelukast (SINGULAIR) 10 MG tablet Take 10 mg by mouth At Bedtime       umeclidinium (INCRUSE ELLIPTA) 62.5 MCG/INH oral inhaler Inhale 1 puff into the lungs daily       zinc sulfate (ZINCATE) 220 (50 ZN) MG capsule Take 220 mg by mouth 2 times daily       enoxaparin (LOVENOX) 120 MG/0.8ML injection Please inject entire contents of Syringe, 120mg of Lovenox subcutaneously every twelve hours daily. (Patient not taking: Reported on 3/19/2018) 10 Syringe 1     COLCHICINE PO Take by mouth daily        "traZODone (DESYREL) 50 MG tablet Take 50 mg by mouth nightly x one week then reduce to 25 mg (1/2 tab) by mouth nightly x one week then stop. (Patient not taking: Reported on 8/11/2017) 11 tablet 0     HYDROmorphone (DILAUDID) 2 MG tablet Take 1 tablet (2 mg) by mouth every 4 hours as needed for moderate to severe pain (Patient not taking: Reported on 7/25/2017) 45 tablet 0     No facility-administered medications prior to visit.        ROS:   CONSTITUTIONAL: Denies fever, chills, and fatigue. He notes weight loss with change in diuretics.   HEENT: Denies headache, vision changes, and changes in speech.   CV: Refer to HPI.   PULMONARY:Denies shortness of breath, cough, or previous TB exposure.   GI:Denies nausea, vomiting, diarrhea, and abdominal pain. Bowel movements are regular. Complains of abdominal distention.   :Denies urinary alterations, dysuria, urinary frequency, hematuria, and abnormal drainage.   EXT:Denies lower extremity edema.   SKIN:Denies abnormal rashes or lesions.   MUSCULOSKELETAL:Denies upper or lower extremity weakness and pain.   NEUROLOGIC:Denies lightheadedness, dizziness, seizures, or upper or lower extremity paresthesia.     EXAM:  BP (!) 86/0 (BP Location: Left arm, Patient Position: Chair, Cuff Size: Adult Large)  Pulse 78  Ht 1.727 m (5' 8\")  Wt 124.7 kg (275 lb)  SpO2 95%  BMI 41.81 kg/m2  GENERAL: Appears alert and oriented times three.   HEENT: Eye symmetrical and free of discharge bilaterally. Mucous membranes moist and without lesions.  NECK: Supple and without lymphadenopathy. JVD mid neck upright.   CV: S1S2 present with LVAD hum.   RESPIRATORY: Respirations regular, even, and unlabored. Lungs CTA throughout.   GI: Soft and distended with normoactive bowel sounds present in all quadrants. No tenderness, rebound, guarding. No organomegaly.   EXTREMITIES: Trace bilateral LE edema. 2+ bilateral pedal pulses.   NEUROLOGIC: Alert and orientated x 3. CN II-XII grossly intact. No " focal deficits.   MUSCULOSKELETAL: No joint swelling or tenderness.   SKIN: No jaundice. No rashes or lesions. LVAD drive line covered with dressing CDI.     Diagnostics:  Echo 4/11/18:  Interpretation Summary  HMII 9600 RPM  Normal inflow and outflow velocities. Left ventricular wall thickness is  normal. Moderate left ventricular dilation is present. The Ejection Fraction  is estimated at 5-10%. Severe diffuse hypokinesis is present.  Mild to moderate right ventricular dilation is present. Global right  ventricular function is moderately to severely reduced.  The aortic valve opens partially with each systole.  There is a mobile echodensity on the right coronary cusp measuring 0.78 cm x  0.29 cm. This echodensity is more prominent when compared to prior study.Could  be aortic valve sclerosis,but cannot rule out other pathology.    Echo 9/15/17:  Interpretation Summary  HM2 9600 RPM  Normal inflow and outflow velocities.  Aortic valve appear to open partially every other beat.  Severely (EF <30%) reduced left ventricular function is present. The Ejection  Fraction is estimated at 15-20%. Severe diffuse hypokinesis is present. Mild  MR.  Severe left ventricular dilation is present (LVIDd: 6.9 cm).  Global right ventricular function is moderately reduced.Mild to moderate right  ventricular dilation is present.  The right ventricular systolic pressure is approximated at 28.1 mmHg plus the  right atrial pressure.  Dilation of the inferior vena cava is present with normal respiratory  variation in diameter. Estimated mean right atrial pressure is 8 mmHg.  No pericardial effusion is present.  Compared to previous study dated 6/23/2017, there has been no significant  Change.    RHC 6/22/17:  mRA-7  RV-50/10  mPA-39  mPCW-29  MICHELLE CO-5.9  MICHELLE CI-2.6    Labs:  CBC RESULTS:  Lab Results   Component Value Date    WBC 9.3 04/11/2018    RBC 4.54 04/11/2018    HGB 11.2 (L) 04/11/2018    HCT 37.3 (L) 04/11/2018    MCV 82  04/11/2018    MCH 24.7 (L) 04/11/2018    MCHC 30.0 (L) 04/11/2018    RDW 15.5 (H) 04/11/2018     04/11/2018       CMP RESULTS:  Lab Results   Component Value Date     04/11/2018    POTASSIUM 4.1 04/11/2018    CHLORIDE 100 04/11/2018    CO2 26 04/11/2018    ANIONGAP 8 04/11/2018     (H) 04/11/2018    BUN 75 (H) 04/11/2018    CR 1.61 (H) 04/11/2018    GFRESTIMATED 44 (L) 04/11/2018    GFRESTBLACK 53 (L) 04/11/2018    QAMAR 8.6 04/11/2018    BILITOTAL 0.4 04/11/2018    ALBUMIN 3.8 04/11/2018    ALKPHOS 108 04/11/2018    ALT 32 04/11/2018    AST 22 04/11/2018        INR RESULTS:  Lab Results   Component Value Date    INR 2.70 (H) 04/11/2018       Lab Results   Component Value Date    MAG 2.1 06/30/2017     Lab Results   Component Value Date    NTBNPI 4216 (H) 06/15/2017     Lab Results   Component Value Date    NTBNP 1119 (H) 12/19/2017       Assessment and Plan:   Mr. Willingham is a 62 year old male with a past medical history including CKD Stage III, DM Type II, CECILIA, HTN, COPD, Asthma, and NICM with EF 20% s/p LVAD HM II 6/16/17. His postoperative course was complicated by arrhythmias, WALTER, and left small pleural effusions. He presents to clinic for routine follow up and remains hypervolemic.     Chronic systolic heart failure secondary to NICM s/p LVAD HM II. Implanted 6/16/17.   Stage D, NYHA Class III  ACEi/ARB Losartan 25 mg po BID   BB Toprol XL 25 mg po BID   Aldosterone antagonist Aldactone 25 mg po BID   SCD prophylaxis CRT-D  % BiV pacing: AP 1.3%, BVP 85.2%, and VSR pacing 7.3% per interrogation 12/19/17.  Fluid status: Hypervolemic. Continue Bumex 2 mg in AM and 1 mg in the evening. Repeat BMP Friday.   Sleep Apnea Evaluation: CPAP  MAP: 86. If BMP remains stable on Friday increase Losartan to 50 mg po daily.   LDH: 335.  Anticoagulation: Coumadin per AC. INR-2.7. Goal INR 2-3.  Antiplatelet: ASA 81 mg po daily     AV mass. Echo 4/11/18 consistent with increased echodensity to right coronary  cusp.   - Will discuss with Dr. Montgomery and compare to prior echo to determine need for CHUN.   - Continue Coumadin as above.     CKD Stage III.   - Cr 1.6 today, attribute mild bump to Aldactone. Repeat Friday given continued diuresis.     HTN  - MAP-86. Plan to increase Losartan on Friday pending repeat BMP. If Cr rises increase Toprol XL.     COPD and Asthma.   - Management per Pulmonary.     CECILIA.   - CPAP.     Follow up BMP Friday and in clinic in 3 weeks.     Soraya Marshall  4/10/2018        CATALINA MCCORMACK

## 2018-04-13 ENCOUNTER — ANTICOAGULATION THERAPY VISIT (OUTPATIENT)
Dept: ANTICOAGULATION | Facility: CLINIC | Age: 63
End: 2018-04-13

## 2018-04-13 DIAGNOSIS — I50.33 ACUTE ON CHRONIC DIASTOLIC CONGESTIVE HEART FAILURE (H): ICD-10-CM

## 2018-04-13 DIAGNOSIS — Z79.01 LONG-TERM (CURRENT) USE OF ANTICOAGULANTS: ICD-10-CM

## 2018-04-13 DIAGNOSIS — I50.33 ACUTE ON CHRONIC DIASTOLIC CONGESTIVE HEART FAILURE (H): Primary | ICD-10-CM

## 2018-04-13 DIAGNOSIS — Z95.811 LVAD (LEFT VENTRICULAR ASSIST DEVICE) PRESENT (H): ICD-10-CM

## 2018-04-13 DIAGNOSIS — M10.00 ACUTE IDIOPATHIC GOUT, UNSPECIFIED SITE: ICD-10-CM

## 2018-04-13 DIAGNOSIS — M79.2 NERVE PAIN: ICD-10-CM

## 2018-04-13 DIAGNOSIS — I50.22 CHRONIC SYSTOLIC CONGESTIVE HEART FAILURE (H): ICD-10-CM

## 2018-04-13 LAB
ANION GAP SERPL CALCULATED.3IONS-SCNC: 8 MMOL/L (ref 3–14)
BUN SERPL-MCNC: 52 MG/DL (ref 7–30)
CALCIUM SERPL-MCNC: 8.9 MG/DL (ref 8.5–10.1)
CHLORIDE SERPL-SCNC: 97 MMOL/L (ref 94–109)
CO2 SERPL-SCNC: 29 MMOL/L (ref 20–32)
CREAT SERPL-MCNC: 1.72 MG/DL (ref 0.66–1.25)
CRP SERPL-MCNC: 9.4 MG/L (ref 0–8)
ERYTHROCYTE [SEDIMENTATION RATE] IN BLOOD BY WESTERGREN METHOD: 18 MM/H (ref 0–20)
GFR SERPL CREATININE-BSD FRML MDRD: 41 ML/MIN/1.7M2
GLUCOSE SERPL-MCNC: 142 MG/DL (ref 70–99)
INR PPP: 2.36 (ref 0.86–1.14)
POTASSIUM SERPL-SCNC: 3.9 MMOL/L (ref 3.4–5.3)
SODIUM SERPL-SCNC: 134 MMOL/L (ref 133–144)

## 2018-04-13 PROCEDURE — 86140 C-REACTIVE PROTEIN: CPT | Performed by: NURSE PRACTITIONER

## 2018-04-13 PROCEDURE — 85652 RBC SED RATE AUTOMATED: CPT | Performed by: NURSE PRACTITIONER

## 2018-04-13 PROCEDURE — 87040 BLOOD CULTURE FOR BACTERIA: CPT | Performed by: NURSE PRACTITIONER

## 2018-04-13 PROCEDURE — 36415 COLL VENOUS BLD VENIPUNCTURE: CPT | Performed by: NURSE PRACTITIONER

## 2018-04-13 PROCEDURE — 85610 PROTHROMBIN TIME: CPT | Performed by: NURSE PRACTITIONER

## 2018-04-13 PROCEDURE — 80048 BASIC METABOLIC PNL TOTAL CA: CPT | Performed by: NURSE PRACTITIONER

## 2018-04-13 NOTE — MR AVS SNAPSHOT
Jim Willingham   4/13/2018   Anticoagulation Therapy Visit    Description:  62 year old male   Provider:  Kirsty Bermudez, RN   Department:  [facility]           INR as of 4/13/2018     Today's INR 2.36      Anticoagulation Summary as of 4/13/2018     INR goal 2.0-3.0   Today's INR 2.36   Full instructions 4/13: 10 mg; 4/14: 12.5 mg; 4/15: 10 mg; 4/16: 10 mg; 4/17: 10 mg   Next INR check 4/17/2018    Indications   LVAD (left ventricular assist device) present (H) [Z95.811]  Long-term (current) use of anticoagulants [Z79.01] [Z79.01]         April 2018 Details    Sun Mon Tue Wed Thu Fri Sat     1               2               3               4               5               6               7                 8               9               10               11               12               13      10 mg   See details      14      12.5 mg           15      10 mg         16      10 mg         17            18               19               20               21                 22               23               24               25               26               27               28                 29               30                     Date Details   04/13 This INR check       Date of next INR:  4/17/2018         How to take your warfarin dose     To take:  10 mg Take 2 of the 5 mg tablets.    To take:  12.5 mg Take 2.5 of the 5 mg tablets.

## 2018-04-13 NOTE — PROGRESS NOTES
ANTICOAGULATION FOLLOW-UP CLINIC VISIT    Patient Name:  Jim Willingham  Date:  4/13/2018  Contact Type:  Telephone    SUBJECTIVE:     Patient Findings     Positives No Problem Findings    Comments INR tends to be drifting down, so will incorporate a 12.5 mg dose of warfarin           OBJECTIVE    INR   Date Value Ref Range Status   04/13/2018 2.36 (H) 0.86 - 1.14 Final       ASSESSMENT / PLAN  INR assessment THER    Recheck INR In: 4 DAYS    INR Location Clinic      Anticoagulation Summary as of 4/13/2018     INR goal 2.0-3.0   Today's INR 2.36   Maintenance plan No maintenance plan   Full instructions 4/13: 10 mg; 4/14: 12.5 mg; 4/15: 10 mg; 4/16: 10 mg; 4/17: 10 mg   Weekly total 62.5 mg   Plan last modified Maru Alonso RN (3/28/2018)   Next INR check 4/17/2018   Priority INR   Target end date     Indications   LVAD (left ventricular assist device) present (H) [Z95.811]  Long-term (current) use of anticoagulants [Z79.01] [Z79.01]         Anticoagulation Episode Summary     INR check location     Preferred lab     Send INR reminders to [facility]    Comments HIPPA Forms mailed 6/26/17  Labs drawn either at [facility] or [facility]      Anticoagulation Care Providers     Provider Role Specialty Phone number    Delisa Montgomery MD Responsible Cardiology [phone]            See the Encounter Report to view Anticoagulation Flowsheet and Dosing Calendar (Go to Encounters tab in chart review, and find the Anticoagulation Therapy Visit)    Spoke with Jim.  He reports no changes in health, diet, medications.      Kirsty Bermudez RN

## 2018-04-17 ENCOUNTER — ANTICOAGULATION THERAPY VISIT (OUTPATIENT)
Dept: ANTICOAGULATION | Facility: CLINIC | Age: 63
End: 2018-04-17

## 2018-04-17 DIAGNOSIS — Z79.01 LONG-TERM (CURRENT) USE OF ANTICOAGULANTS: ICD-10-CM

## 2018-04-17 DIAGNOSIS — Z95.811 LVAD (LEFT VENTRICULAR ASSIST DEVICE) PRESENT (H): ICD-10-CM

## 2018-04-17 LAB — INR BLD: 3 (ref 0.86–1.14)

## 2018-04-17 PROCEDURE — 36416 COLLJ CAPILLARY BLOOD SPEC: CPT | Performed by: INTERNAL MEDICINE

## 2018-04-17 PROCEDURE — 85610 PROTHROMBIN TIME: CPT | Mod: QW | Performed by: INTERNAL MEDICINE

## 2018-04-17 NOTE — PROGRESS NOTES
ANTICOAGULATION FOLLOW-UP CLINIC VISIT    Patient Name:  Jim Willingham  Date:  4/17/2018  Contact Type:  Telephone    SUBJECTIVE:     Patient Findings     Positives No Problem Findings           OBJECTIVE    INR Point of Care   Date Value Ref Range Status   04/17/2018 3.0 (H) 0.86 - 1.14 Final     Comment:     This test is intended for monitoring Coumadin therapy.  Results are not   accurate in patients with prolonged INR due to factor deficiency.         ASSESSMENT / PLAN  INR assessment THER    Recheck INR In: 1 WEEK    INR Location Clinic      Anticoagulation Summary as of 4/17/2018     INR goal 2.0-3.0   Today's INR 3.0   Maintenance plan No maintenance plan   Full instructions 4/17: 10 mg; 4/18: 10 mg; 4/19: 10 mg; 4/20: 10 mg; 4/21: 10 mg; 4/22: 10 mg; 4/23: 10 mg   Weekly total 62.5 mg   Plan last modified Maru Alonso RN (3/28/2018)   Next INR check 4/24/2018   Priority INR   Target end date     Indications   LVAD (left ventricular assist device) present (H) [Z95.811]  Long-term (current) use of anticoagulants [Z79.01] [Z79.01]         Anticoagulation Episode Summary     INR check location     Preferred lab     Send INR reminders to [facility]    Comments HIPPA Forms mailed 6/26/17  Labs drawn either at [facility] or [facility]      Anticoagulation Care Providers     Provider Role Specialty Phone number    Ulises Delisa Sprague MD Responsible Cardiology [phone]            See the Encounter Report to view Anticoagulation Flowsheet and Dosing Calendar (Go to Encounters tab in chart review, and find the Anticoagulation Therapy Visit)  Spoke with patient.    Delisa Hillman RN

## 2018-04-17 NOTE — MR AVS SNAPSHOT
Jim Willingham   4/17/2018   Anticoagulation Therapy Visit    Description:  62 year old male   Provider:  Delisa Hillman RN   Department:  [facility]           INR as of 4/17/2018     Today's INR 3.0      Anticoagulation Summary as of 4/17/2018     INR goal 2.0-3.0   Today's INR 3.0   Full instructions 4/17: 10 mg; 4/18: 10 mg; 4/19: 10 mg; 4/20: 10 mg; 4/21: 10 mg; 4/22: 10 mg; 4/23: 10 mg   Next INR check 4/24/2018    Indications   LVAD (left ventricular assist device) present (H) [Z95.811]  Long-term (current) use of anticoagulants [Z79.01] [Z79.01]         April 2018 Details    Sun Mon Tue Wed Thu Fri Sat     1               2               3               4               5               6               7                 8               9               10               11               12               13               14                 15               16               17      10 mg   See details      18      10 mg         19      10 mg         20      10 mg         21      10 mg           22      10 mg         23      10 mg         24            25               26               27               28                 29               30                     Date Details   04/17 This INR check       Date of next INR:  4/24/2018         How to take your warfarin dose     To take:  10 mg Take 2 of the 5 mg tablets.

## 2018-04-19 DIAGNOSIS — Z95.811 LVAD (LEFT VENTRICULAR ASSIST DEVICE) PRESENT (H): Primary | ICD-10-CM

## 2018-04-19 DIAGNOSIS — I50.22 CHRONIC SYSTOLIC CONGESTIVE HEART FAILURE (H): ICD-10-CM

## 2018-04-19 LAB
BACTERIA SPEC CULT: NO GROWTH
BACTERIA SPEC CULT: NO GROWTH
Lab: NORMAL
Lab: NORMAL
SPECIMEN SOURCE: NORMAL
SPECIMEN SOURCE: NORMAL

## 2018-04-20 ENCOUNTER — ANTICOAGULATION THERAPY VISIT (OUTPATIENT)
Dept: ANTICOAGULATION | Facility: CLINIC | Age: 63
End: 2018-04-20

## 2018-04-20 ENCOUNTER — CARE COORDINATION (OUTPATIENT)
Dept: CARDIOLOGY | Facility: CLINIC | Age: 63
End: 2018-04-20

## 2018-04-20 DIAGNOSIS — Z95.811 LVAD (LEFT VENTRICULAR ASSIST DEVICE) PRESENT (H): Primary | ICD-10-CM

## 2018-04-20 DIAGNOSIS — Z95.811 LVAD (LEFT VENTRICULAR ASSIST DEVICE) PRESENT (H): ICD-10-CM

## 2018-04-20 DIAGNOSIS — Z79.01 LONG-TERM (CURRENT) USE OF ANTICOAGULANTS: ICD-10-CM

## 2018-04-20 DIAGNOSIS — I50.22 CHRONIC SYSTOLIC CONGESTIVE HEART FAILURE (H): ICD-10-CM

## 2018-04-20 DIAGNOSIS — M79.2 NERVE PAIN: ICD-10-CM

## 2018-04-20 DIAGNOSIS — M10.00 ACUTE IDIOPATHIC GOUT, UNSPECIFIED SITE: ICD-10-CM

## 2018-04-20 LAB
ANION GAP SERPL CALCULATED.3IONS-SCNC: 6 MMOL/L (ref 3–14)
BUN SERPL-MCNC: 56 MG/DL (ref 7–30)
CALCIUM SERPL-MCNC: 8.7 MG/DL (ref 8.5–10.1)
CHLORIDE SERPL-SCNC: 97 MMOL/L (ref 94–109)
CO2 SERPL-SCNC: 31 MMOL/L (ref 20–32)
CREAT SERPL-MCNC: 2.23 MG/DL (ref 0.66–1.25)
GFR SERPL CREATININE-BSD FRML MDRD: 30 ML/MIN/1.7M2
GLUCOSE SERPL-MCNC: 160 MG/DL (ref 70–99)
INR PPP: 2.08 (ref 0.86–1.14)
POTASSIUM SERPL-SCNC: 4.2 MMOL/L (ref 3.4–5.3)
SODIUM SERPL-SCNC: 134 MMOL/L (ref 133–144)

## 2018-04-20 PROCEDURE — 36415 COLL VENOUS BLD VENIPUNCTURE: CPT | Performed by: INTERNAL MEDICINE

## 2018-04-20 PROCEDURE — 85610 PROTHROMBIN TIME: CPT | Performed by: INTERNAL MEDICINE

## 2018-04-20 PROCEDURE — 80048 BASIC METABOLIC PNL TOTAL CA: CPT | Performed by: INTERNAL MEDICINE

## 2018-04-20 RX ORDER — SPIRONOLACTONE 25 MG/1
25 TABLET ORAL DAILY
Qty: 30 TABLET | Refills: 5 | COMMUNITY
Start: 2018-04-20 | End: 2020-01-16

## 2018-04-20 RX ORDER — BUMETANIDE 1 MG/1
1 TABLET ORAL 2 TIMES DAILY
Qty: 180 TABLET | Refills: 3 | COMMUNITY
Start: 2018-04-20 | End: 2018-07-06

## 2018-04-20 RX ORDER — METOPROLOL SUCCINATE 25 MG/1
37.5 TABLET, EXTENDED RELEASE ORAL 2 TIMES DAILY
Qty: 180 TABLET | Refills: 3 | COMMUNITY
Start: 2018-04-20 | End: 2018-06-25

## 2018-04-20 NOTE — PROGRESS NOTES
Pt had labs drawn today. Reviewed with pt and with Soraya Marshall NP. Pt reports he had a GI bug the last few days and has not had very good po intake, but weight currently is at 257lb. He reports his LVAD numbers are closer to baseline, with flows in the 6's and PI's around 4.5.   Received order from Soraya to decrease bumex to 1mg BID and aldactone to 25mg daily. She also ordered to increase Toprol XL to 37.5mg BID. Pt verbalized understanding and will get labs drawn again next week.

## 2018-04-20 NOTE — PROGRESS NOTES
ANTICOAGULATION FOLLOW-UP CLINIC VISIT    Patient Name:  Jim Willingham  Date:  4/20/2018  Contact Type:  Telephone    SUBJECTIVE:     Patient Findings     Comments Patient reports he had 1 day of nausea and vomiting.            OBJECTIVE    INR   Date Value Ref Range Status   04/20/2018 2.08 (H) 0.86 - 1.14 Final       ASSESSMENT / PLAN  No question data found.  Anticoagulation Summary as of 4/20/2018     INR goal 2.0-3.0   Today's INR 2.08   Maintenance plan No maintenance plan   Full instructions 4/20: 12.5 mg; 4/21: 10 mg; 4/22: 10 mg; 4/23: 10 mg   Weekly total 62.5 mg   Plan last modified Maru Alonso RN (3/28/2018)   Next INR check 4/24/2018   Priority INR   Target end date     Indications   LVAD (left ventricular assist device) present (H) [Z95.811]  Long-term (current) use of anticoagulants [Z79.01] [Z79.01]         Anticoagulation Episode Summary     INR check location     Preferred lab     Send INR reminders to [facility]    Comments HIPPA Forms mailed 6/26/17  Labs drawn either at [facility] or [facility]      Anticoagulation Care Providers     Provider Role Specialty Phone number    Delisa Montgomery MD Responsible Cardiology [phone]            See the Encounter Report to view Anticoagulation Flowsheet and Dosing Calendar (Go to Encounters tab in chart review, and find the Anticoagulation Therapy Visit)    Spoke with Jim.     Maru Alonso RN             4/24/18 ADDENDUM  Spoke to Jim.  Will continue maintenance dosing.  Check INR tomorrow.    Brendan Montoya RN

## 2018-04-23 DIAGNOSIS — Z95.811 LVAD (LEFT VENTRICULAR ASSIST DEVICE) PRESENT (H): ICD-10-CM

## 2018-04-23 DIAGNOSIS — I50.22 CHRONIC SYSTOLIC CONGESTIVE HEART FAILURE (H): ICD-10-CM

## 2018-04-25 ENCOUNTER — ANTICOAGULATION THERAPY VISIT (OUTPATIENT)
Dept: ANTICOAGULATION | Facility: CLINIC | Age: 63
End: 2018-04-25

## 2018-04-25 DIAGNOSIS — I50.22 CHRONIC SYSTOLIC CONGESTIVE HEART FAILURE (H): ICD-10-CM

## 2018-04-25 DIAGNOSIS — Z95.811 LVAD (LEFT VENTRICULAR ASSIST DEVICE) PRESENT (H): ICD-10-CM

## 2018-04-25 DIAGNOSIS — Z79.01 LONG-TERM (CURRENT) USE OF ANTICOAGULANTS: ICD-10-CM

## 2018-04-25 LAB
ANION GAP SERPL CALCULATED.3IONS-SCNC: 9 MMOL/L (ref 3–14)
BUN SERPL-MCNC: 41 MG/DL (ref 7–30)
CALCIUM SERPL-MCNC: 8.6 MG/DL (ref 8.5–10.1)
CHLORIDE SERPL-SCNC: 100 MMOL/L (ref 94–109)
CO2 SERPL-SCNC: 27 MMOL/L (ref 20–32)
CREAT SERPL-MCNC: 1.55 MG/DL (ref 0.66–1.25)
GFR SERPL CREATININE-BSD FRML MDRD: 46 ML/MIN/1.7M2
GLUCOSE SERPL-MCNC: 155 MG/DL (ref 70–99)
INR PPP: 2.52 (ref 0.86–1.14)
POTASSIUM SERPL-SCNC: 4.3 MMOL/L (ref 3.4–5.3)
SODIUM SERPL-SCNC: 136 MMOL/L (ref 133–144)

## 2018-04-25 PROCEDURE — 36415 COLL VENOUS BLD VENIPUNCTURE: CPT | Performed by: INTERNAL MEDICINE

## 2018-04-25 PROCEDURE — 85610 PROTHROMBIN TIME: CPT | Performed by: INTERNAL MEDICINE

## 2018-04-25 PROCEDURE — 80048 BASIC METABOLIC PNL TOTAL CA: CPT | Performed by: INTERNAL MEDICINE

## 2018-04-25 RX ORDER — WARFARIN SODIUM 5 MG/1
TABLET ORAL
Qty: 75 TABLET | Refills: 5 | Status: SHIPPED | OUTPATIENT
Start: 2018-04-25 | End: 2018-10-22

## 2018-04-25 NOTE — PROGRESS NOTES
ANTICOAGULATION FOLLOW-UP CLINIC VISIT    Patient Name:  Jim Willingham  Date:  4/25/2018  Contact Type:  Telephone    SUBJECTIVE:     Patient Findings     Positives No Problem Findings           OBJECTIVE    INR   Date Value Ref Range Status   04/25/2018 2.52 (H) 0.86 - 1.14 Final       ASSESSMENT / PLAN  No question data found.  Anticoagulation Summary as of 4/25/2018     INR goal 2.0-3.0   Today's INR 2.52   Maintenance plan No maintenance plan   Full instructions 4/25: 10 mg; 4/26: 10 mg; 4/27: 12.5 mg; 4/28: 10 mg; 4/29: 10 mg; 4/30: 10 mg; 5/1: 10 mg   Weekly total 62.5 mg   Plan last modified Maru Alonso RN (3/28/2018)   Next INR check 5/2/2018   Priority INR   Target end date     Indications   LVAD (left ventricular assist device) present (H) [Z95.811]  Long-term (current) use of anticoagulants [Z79.01] [Z79.01]         Anticoagulation Episode Summary     INR check location     Preferred lab     Send INR reminders to [facility]    Comments HIPPA Forms mailed 6/26/17  Labs drawn either at [facility] or [facility]      Anticoagulation Care Providers     Provider Role Specialty Phone number    Delisa Montgomery MD Responsible Cardiology [phone]            See the Encounter Report to view Anticoagulation Flowsheet and Dosing Calendar (Go to Encounters tab in chart review, and find the Anticoagulation Therapy Visit)    Spoke with Tatyana Alonso, REGINALD

## 2018-04-25 NOTE — MR AVS SNAPSHOT
Jim Willingham   4/25/2018   Anticoagulation Therapy Visit    Description:  62 year old male   Provider:  Maru Alonso, RN   Department:  [facility]           INR as of 4/25/2018     Today's INR 2.52      Anticoagulation Summary as of 4/25/2018     INR goal 2.0-3.0   Today's INR 2.52   Full instructions 4/25: 10 mg; 4/26: 10 mg; 4/27: 12.5 mg; 4/28: 10 mg; 4/29: 10 mg; 4/30: 10 mg; 5/1: 10 mg   Next INR check 5/2/2018    Indications   LVAD (left ventricular assist device) present (H) [Z95.811]  Long-term (current) use of anticoagulants [Z79.01] [Z79.01]         April 2018 Details    Sun Mon Tue Wed Thu Fri Sat     1               2               3               4               5               6               7                 8               9               10               11               12               13               14                 15               16               17               18               19               20               21                 22               23               24               25      10 mg   See details      26      10 mg         27      12.5 mg         28      10 mg           29      10 mg         30      10 mg               Date Details   04/25 This INR check               How to take your warfarin dose     To take:  10 mg Take 2 of the 5 mg tablets.    To take:  12.5 mg Take 2.5 of the 5 mg tablets.           May 2018 Details    Sun Mon Tue Wed Thu Fri Sat       1      10 mg         2            3               4               5                 6               7               8               9               10               11               12                 13               14               15               16               17               18               19                 20               21               22               23               24               25               26                 27               28               29               30                31                  Date Details   No additional details    Date of next INR:  5/2/2018         How to take your warfarin dose     To take:  10 mg Take 2 of the 5 mg tablets.

## 2018-04-30 DIAGNOSIS — I50.22 CHRONIC SYSTOLIC CONGESTIVE HEART FAILURE (H): ICD-10-CM

## 2018-04-30 DIAGNOSIS — Z95.811 LVAD (LEFT VENTRICULAR ASSIST DEVICE) PRESENT (H): Primary | ICD-10-CM

## 2018-05-02 ENCOUNTER — ANTICOAGULATION THERAPY VISIT (OUTPATIENT)
Dept: ANTICOAGULATION | Facility: CLINIC | Age: 63
End: 2018-05-02

## 2018-05-02 DIAGNOSIS — Z95.811 LVAD (LEFT VENTRICULAR ASSIST DEVICE) PRESENT (H): ICD-10-CM

## 2018-05-02 DIAGNOSIS — Z79.01 LONG-TERM (CURRENT) USE OF ANTICOAGULANTS: ICD-10-CM

## 2018-05-02 LAB — INR BLD: 4.5 (ref 0.86–1.14)

## 2018-05-02 PROCEDURE — 36416 COLLJ CAPILLARY BLOOD SPEC: CPT | Performed by: INTERNAL MEDICINE

## 2018-05-02 PROCEDURE — 85610 PROTHROMBIN TIME: CPT | Mod: QW | Performed by: INTERNAL MEDICINE

## 2018-05-02 NOTE — MR AVS SNAPSHOT
Jim Willingham   5/2/2018   Anticoagulation Therapy Visit    Description:  62 year old male   Provider:  Delisa Hillman, RN   Department:  [facility]           INR as of 5/2/2018     Today's INR 4.5!      Anticoagulation Summary as of 5/2/2018     INR goal 2.0-3.0   Today's INR 4.5!   Full instructions 5/2: Hold; 5/3: 7.5 mg   Next INR check 5/4/2018    Indications   LVAD (left ventricular assist device) present (H) [Z95.811]  Long-term (current) use of anticoagulants [Z79.01] [Z79.01]         May 2018 Details    Sun Mon Tue Wed Thu Fri Sat       1               2      Hold   See details      3      7.5 mg         4            5                 6               7               8               9               10               11               12                 13               14               15               16               17               18               19                 20               21               22               23               24               25               26                 27               28               29               30               31                  Date Details   05/02 This INR check       Date of next INR:  5/4/2018         How to take your warfarin dose     To take:  7.5 mg Take 1.5 of the 5 mg tablets.    Hold Do not take your warfarin dose. See the Details table to the right for additional instructions.

## 2018-05-02 NOTE — PROGRESS NOTES
ANTICOAGULATION FOLLOW-UP CLINIC VISIT    Patient Name:  Jim Willingham  Date:  5/2/2018  Contact Type:  Telephone    SUBJECTIVE:     Patient Findings     Positives OTC meds (Recently started taking Tylenol for achy joints a couple times a day.)           OBJECTIVE    INR Point of Care   Date Value Ref Range Status   05/02/2018 4.5 (H) 0.86 - 1.14 Final     Comment:     This test is intended for monitoring Coumadin therapy.  Results are not   accurate in patients with prolonged INR due to factor deficiency.         ASSESSMENT / PLAN  INR assessment SUPRA    Recheck INR In: 2 DAYS    INR Location Clinic      Anticoagulation Summary as of 5/2/2018     INR goal 2.0-3.0   Today's INR 4.5!   Maintenance plan No maintenance plan   Full instructions 5/2: Hold; 5/3: 7.5 mg   Weekly total 62.5 mg   Plan last modified Maru Alonso RN (3/28/2018)   Next INR check 5/4/2018   Priority INR   Target end date     Indications   LVAD (left ventricular assist device) present (H) [Z95.811]  Long-term (current) use of anticoagulants [Z79.01] [Z79.01]         Anticoagulation Episode Summary     INR check location     Preferred lab     Send INR reminders to [facility]    Comments HIPPA Forms mailed 6/26/17  Labs drawn either at [facility] or [facility]      Anticoagulation Care Providers     Provider Role Specialty Phone number    Delisa Montgomery MD [facility] Cardiology [phone]            See the Encounter Report to view Anticoagulation Flowsheet and Dosing Calendar (Go to Encounters tab in chart review, and find the Anticoagulation Therapy Visit)    Spoke with patient.    Delisa Hillman RN

## 2018-05-04 ENCOUNTER — OFFICE VISIT (OUTPATIENT)
Dept: CARDIOLOGY | Facility: CLINIC | Age: 63
End: 2018-05-04
Attending: NURSE PRACTITIONER
Payer: COMMERCIAL

## 2018-05-04 ENCOUNTER — ANTICOAGULATION THERAPY VISIT (OUTPATIENT)
Dept: ANTICOAGULATION | Facility: CLINIC | Age: 63
End: 2018-05-04

## 2018-05-04 VITALS
HEART RATE: 76 BPM | OXYGEN SATURATION: 96 % | SYSTOLIC BLOOD PRESSURE: 76 MMHG | BODY MASS INDEX: 40.74 KG/M2 | HEIGHT: 68 IN | TEMPERATURE: 97.9 F | WEIGHT: 268.8 LBS

## 2018-05-04 DIAGNOSIS — Z95.811 LVAD (LEFT VENTRICULAR ASSIST DEVICE) PRESENT (H): Primary | ICD-10-CM

## 2018-05-04 DIAGNOSIS — Z95.811 LVAD (LEFT VENTRICULAR ASSIST DEVICE) PRESENT (H): ICD-10-CM

## 2018-05-04 DIAGNOSIS — I50.22 CHRONIC SYSTOLIC CONGESTIVE HEART FAILURE (H): ICD-10-CM

## 2018-05-04 DIAGNOSIS — Z79.01 LONG-TERM (CURRENT) USE OF ANTICOAGULANTS: ICD-10-CM

## 2018-05-04 LAB
ALBUMIN SERPL-MCNC: 3.8 G/DL (ref 3.4–5)
ALP SERPL-CCNC: 104 U/L (ref 40–150)
ALT SERPL W P-5'-P-CCNC: 41 U/L (ref 0–70)
ANION GAP SERPL CALCULATED.3IONS-SCNC: 9 MMOL/L (ref 3–14)
AST SERPL W P-5'-P-CCNC: 36 U/L (ref 0–45)
BILIRUB SERPL-MCNC: 0.5 MG/DL (ref 0.2–1.3)
BUN SERPL-MCNC: 40 MG/DL (ref 7–30)
CALCIUM SERPL-MCNC: 8.2 MG/DL (ref 8.5–10.1)
CHLORIDE SERPL-SCNC: 102 MMOL/L (ref 94–109)
CO2 SERPL-SCNC: 25 MMOL/L (ref 20–32)
CREAT SERPL-MCNC: 1.3 MG/DL (ref 0.66–1.25)
ERYTHROCYTE [DISTWIDTH] IN BLOOD BY AUTOMATED COUNT: 16.3 % (ref 10–15)
GFR SERPL CREATININE-BSD FRML MDRD: 56 ML/MIN/1.7M2
GLUCOSE SERPL-MCNC: 95 MG/DL (ref 70–99)
HCT VFR BLD AUTO: 37.1 % (ref 40–53)
HGB BLD-MCNC: 11.1 G/DL (ref 13.3–17.7)
INR PPP: 2.16 (ref 0.86–1.14)
LDH SERPL L TO P-CCNC: 340 U/L (ref 85–227)
MCH RBC QN AUTO: 24.8 PG (ref 26.5–33)
MCHC RBC AUTO-ENTMCNC: 29.9 G/DL (ref 31.5–36.5)
MCV RBC AUTO: 83 FL (ref 78–100)
PLATELET # BLD AUTO: 217 10E9/L (ref 150–450)
POTASSIUM SERPL-SCNC: 3.6 MMOL/L (ref 3.4–5.3)
PROT SERPL-MCNC: 7.1 G/DL (ref 6.8–8.8)
RBC # BLD AUTO: 4.48 10E12/L (ref 4.4–5.9)
SODIUM SERPL-SCNC: 136 MMOL/L (ref 133–144)
WBC # BLD AUTO: 7.9 10E9/L (ref 4–11)

## 2018-05-04 PROCEDURE — G0463 HOSPITAL OUTPT CLINIC VISIT: HCPCS | Mod: 25,ZF

## 2018-05-04 PROCEDURE — 85610 PROTHROMBIN TIME: CPT | Performed by: NURSE PRACTITIONER

## 2018-05-04 PROCEDURE — 83615 LACTATE (LD) (LDH) ENZYME: CPT | Performed by: NURSE PRACTITIONER

## 2018-05-04 PROCEDURE — 80053 COMPREHEN METABOLIC PANEL: CPT | Performed by: NURSE PRACTITIONER

## 2018-05-04 PROCEDURE — 99214 OFFICE O/P EST MOD 30 MIN: CPT | Mod: 25 | Performed by: NURSE PRACTITIONER

## 2018-05-04 PROCEDURE — 93750 INTERROGATION VAD IN PERSON: CPT | Mod: ZF | Performed by: NURSE PRACTITIONER

## 2018-05-04 PROCEDURE — 85027 COMPLETE CBC AUTOMATED: CPT | Performed by: NURSE PRACTITIONER

## 2018-05-04 PROCEDURE — 36415 COLL VENOUS BLD VENIPUNCTURE: CPT | Performed by: NURSE PRACTITIONER

## 2018-05-04 RX ORDER — POTASSIUM CHLORIDE 1.5 G/1.58G
20 POWDER, FOR SOLUTION ORAL DAILY
Qty: 60 PACKET | Refills: 5 | Status: SHIPPED | OUTPATIENT
Start: 2018-05-04 | End: 2018-11-20

## 2018-05-04 ASSESSMENT — PAIN SCALES - GENERAL: PAINLEVEL: NO PAIN (0)

## 2018-05-04 NOTE — PROGRESS NOTES
ANTICOAGULATION FOLLOW-UP CLINIC VISIT    Patient Name:  Jim Willingham  Date:  5/4/2018  Contact Type:  Telephone    SUBJECTIVE:     Patient Findings     Positives No Problem Findings    Comments Spoke to Jim.  Recommended patient resume maintenance dosing schedule.  Will check in one week.           OBJECTIVE    INR   Date Value Ref Range Status   05/04/2018 2.16 (H) 0.86 - 1.14 Final       ASSESSMENT / PLAN  INR assessment THER    Recheck INR In: 1 WEEK    INR Location Clinic      Anticoagulation Summary as of 5/4/2018     INR goal 2.0-3.0   Today's INR 2.16   Maintenance plan No maintenance plan   Full instructions 5/4: 12.5 mg; 5/5: 10 mg; 5/6: 10 mg; 5/7: 10 mg; 5/8: 10 mg; 5/9: 10 mg; 5/10: 10 mg   Weekly total 62.5 mg   Plan last modified Maru Alonso RN (3/28/2018)   Next INR check 5/11/2018   Priority INR   Target end date     Indications   LVAD (left ventricular assist device) present (H) [Z95.811]  Long-term (current) use of anticoagulants [Z79.01] [Z79.01]         Anticoagulation Episode Summary     INR check location     Preferred lab     Send INR reminders to [facility]    Comments HIPPA Forms mailed 6/26/17  Labs drawn either at [facility] or [facility]      Anticoagulation Care Providers     Provider Role Specialty Phone number    Delisa Montgomery MD Responsible Cardiology [phone]            See the Encounter Report to view Anticoagulation Flowsheet and Dosing Calendar (Go to Encounters tab in chart review, and find the Anticoagulation Therapy Visit)    Spoke with patient. Gave them their lab results and new warfarin recommendation.  No changes in health, medication, or diet. No missed doses, no falls. No signs or symptoms of bleed or clotting.    Brendan Montoya, RN

## 2018-05-04 NOTE — LETTER
5/4/2018      RE: Jim Willingham  [address] [place] [facility] [address]       Dear Colleague,    Thank you for the opportunity to participate in the care of your patient, Jim Willingham, at the [facility]. Please see a copy of my visit note below.    HPI:   Jim Willingham is a 62 year old male with a past medical history of NICM (EF 20%) s/p HM II LVAD placement (6/19/17) complicated by arrhythmias, WALTER, and small left pleural effusion, CKD Stage III, DM Type II, CECILIA, obesity, HTN, COPD, and asthma who is here for heart failure and LVAD follow up. He was last seen in clinic a month ago when he appeared volume up. He also had an echodensity noted on echo. Since then he has had a GI bug prompting a decrease in his diuretics. He returns for follow up.    Jim is feeling better with less lightheadedness. He is still short of breath with activity such as taking the garbage out or carrying his granddaughter. The dyspnea is still improved. No orthopnea or PND. Using CPAP. No chest pain, palpitations, syncope, no lower extremity edema though has noted some hand swelling.     Weights at home have been 257-260 pounds at home over the last 2 weeks.     Jim denies fevers, chills, driveline redness, tenderness, or discharge. Unaware of any LVAD alarms and denies any stroke symptoms or blood in the urine or stools. INRs have been therapeutic.     PAST MEDICAL HISTORY:  Past Medical History:   Diagnosis Date     Benign essential hypertension 5/11/2017     Cardiomyopathy, unspecified 5/8/2017     CKD (chronic kidney disease) stage 3, GFR 30-59 ml/min 5/11/2017     Depression 5/11/2017     Diabetes mellitus (H) 5/11/2017     H/O gastric bypass 5/11/2017     ICD (implantable cardioverter-defibrillator), biventricular, in situ 5/11/2017     NICM (nonischemic cardiomyopathy) (H)/ EF 20% 5/11/2017    ECHO: LVEDd. 7.66 cm, Restrictive pattern , Severe mitral valve  regurgitation     CECILIA (obstructive sleep apnea) 5/11/2017     Paroxysmal atrial fibrillation (H) 5/11/2017     Paroxysmal VT (H) 5/11/2017     Vitamin B12 deficiency (non anemic) 5/11/2017       FAMILY HISTORY:  Family History   Problem Relation Age of Onset     CEREBROVASCULAR DISEASE Mother      DIABETES Mother      Hypertension Mother      Coronary Artery Disease Father      Type 2 Diabetes Father      SOCIAL HISTORY:  Social History     Social History     Marital status:      Spouse name: N/A     Number of children: N/A     Years of education: N/A     Social History Main Topics     Smoking status: Former Smoker     Quit date: 1995     Smokeless tobacco: Not on file     Alcohol use No     Drug use: Not on file     Sexual activity: Yes     Partners: Female     Other Topics Concern     Parent/Sibling W/ Cabg, Mi Or Angioplasty Before 65f 55m? No     Social History Narrative       CURRENT MEDICATIONS:  Current Outpatient Prescriptions on File Prior to Visit:  acetaminophen (TYLENOL) 325 MG tablet Take 2 tablets (650 mg) by mouth every 6 hours   albuterol (ALBUTEROL) 108 (90 BASE) MCG/ACT Inhaler Inhale 2 puffs into the lungs every 4 hours as needed for shortness of breath / dyspnea or wheezing   allopurinol (ZYLOPRIM) 100 MG tablet Take 1 tablet (100 mg) by mouth daily   aspirin 81 MG chewable tablet 1 tablet (81 mg) by Oral or Feeding Tube route daily   bumetanide (BUMEX) 1 MG tablet Take 1 tablet (1 mg) by mouth 2 times daily   citalopram (CELEXA) 20 MG tablet Take 20 mg by mouth daily   cyanocobalamin (VITAMIN B12) 1000 MCG/ML injection Inject 1,000 mcg into the muscle every 30 days   fluticasone-vilanterol (BREO ELLIPTA) 200-25 MCG/INH oral inhaler Inhale 1 puff into the lungs daily   gabapentin (NEURONTIN) 300 MG capsule Take 1 capsule (300 mg) by mouth 3 times daily   ipratropium - albuterol 0.5 mg/2.5 mg/3 mL (DUONEB) 0.5-2.5 (3) MG/3ML neb solution Take 3 mLs by nebulization every 4 hours as needed  "for shortness of breath / dyspnea or wheezing   losartan (COZAAR) 25 MG tablet Take 1 tablet (25 mg) by mouth daily   metFORMIN (GLUCOPHAGE) 500 MG tablet Take 0.5 tablets (250 mg) by mouth 2 times daily (with meals)   metoprolol succinate (TOPROL-XL) 25 MG 24 hr tablet Take 1.5 tablets (37.5 mg) by mouth 2 times daily   montelukast (SINGULAIR) 10 MG tablet Take 10 mg by mouth At Bedtime   pantoprazole (PROTONIX) 40 MG EC tablet Take 1 tablet (40 mg) by mouth every morning   polyethylene glycol (MIRALAX/GLYCOLAX) Packet Take 17 g by mouth daily as needed for constipation (for no bm in 1 day)   predniSONE (DELTASONE) 20 MG tablet Take 0.5 tablets (10 mg) by mouth daily   senna-docusate (SENOKOT-S;PERICOLACE) 8.6-50 MG per tablet Take 2 tablets by mouth 2 times daily as needed for constipation   spironolactone (ALDACTONE) 25 MG tablet Take 1 tablet (25 mg) by mouth daily   traMADol (ULTRAM) 50 MG tablet Take 2 tablets (100 mg) by mouth every 6 hours as needed for moderate pain or breakthrough pain   umeclidinium (INCRUSE ELLIPTA) 62.5 MCG/INH oral inhaler Inhale 1 puff into the lungs daily   warfarin (COUMADIN) 3 MG tablet Use as directed.   warfarin (COUMADIN) 5 MG tablet Take 2-2.5 tablets daily or as directed by coumadin clinic.   zinc sulfate (ZINCATE) 220 (50 ZN) MG capsule Take 220 mg by mouth 2 times daily   enoxaparin (LOVENOX) 120 MG/0.8ML injection Please inject entire contents of Syringe, 120mg of Lovenox subcutaneously every twelve hours daily. (Patient not taking: Reported on 3/19/2018)   traZODone (DESYREL) 50 MG tablet Take 50 mg by mouth nightly x one week then reduce to 25 mg (1/2 tab) by mouth nightly x one week then stop. (Patient not taking: Reported on 8/11/2017)     No current facility-administered medications on file prior to visit.     EXAM:  BP (!) 76/0  Pulse 76  Temp 97.9  F (36.6  C)  Ht 1.727 m (5' 8\")  Wt 121.9 kg (268 lb 12.8 oz)  SpO2 96%  BMI 40.87 kg/m2  General: appears " comfortable, alert and articulate  Head: normocephalic, atraumatic  Eyes: anicteric sclera, EOMI  Neck: no adenopathy, neck supple  Orophyarynx: moist mucosa, no lesions, dentition intact  Heart: LVAD hum present, no murmur, gallop, rub.  No appreciable JVD.  Lungs: clear, no rales or wheezing  Abdomen: soft, non-tender, bowel sounds present, no hepatomegaly.    Extremities: no clubbing, cyanosis or edema.    Neurological: normal speech and affect, no gross motor deficits      VAD Interrogation on 18: VAD interrogation reviewed with VAD coordinator. Agree with findings. Frequent PI events, particularly prior to May 1, rarely accompanied with speed changes. No power spikes, speed drops, or other findings suspicious of pump malfunction noted.     Labs:  Reviewed CBC, CMP, LDH, INR,     Most recent echocardiogram:  Recent Results (from the past 4320 hour(s))   Echo LVAD Complete *    Narrative    [id number]  [id number]  [id number]  [id number]^KEL^BRITNEY^ANGELIA           [facility]  Echocardiography Laboratory  [address] [place] [id number]     Name: LOIS FULLER  MRN: [id number]  : 1955  Study Date: 2018 07:31 AM  Age: 62 yrs  Gender: Male  Patient Location: [facility]  Reason For Study: LVAD (left ventricular assist device) present (H),  Ordering Physician: BRITNEY BEGUM  Referring Physician: BRITNEY BEGUM  Performed By: Rhiannon Love RDCS, RVT     BSA: 2.3 m2  Height: 68 in  Weight: 259 lb  _____________________________________________________________________________  __        Procedure  LVAD Echocardiogram with two-dimensional, color and spectral Doppler  performed.  _____________________________________________________________________________  __        Interpretation Summary  HMII 9600 RPM  Normal inflow and outflow velocities. Left ventricular wall thickness is  normal. Moderate left ventricular dilation is present. The Ejection Fraction  is estimated at  5-10%. Severe diffuse hypokinesis is present.  Mild to moderate right ventricular dilation is present. Global right  ventricular function is moderately to severely reduced.  The aortic valve opens partially with each systole.  There is a mobile echodensity on the right coronary cusp measuring 0.78 cm x  0.29 cm. This echodensity is more prominent when compared to prior study.Could  be aortic valve sclerosis,but cannot rule out other pathology.  _____________________________________________________________________________  __        Left Ventricle  Left ventricular wall thickness is normal. Moderate left ventricular dilation  is present. The Ejection Fraction is estimated at 5-10%. Severe diffuse  hypokinesis is present.     Right Ventricle  Mild to moderate right ventricular dilation is present. Global right  ventricular function is moderately to severely reduced. A pacemaker lead is  noted in the right ventricle.     Atria  Atria are not well visualized but appear to be at least mildly enlarged.        Mitral Valve  Trace mitral insufficiency is present.     Aortic Valve  The aortic valve opens partially and there is minimal flow through the aortic  valve during systole. There is a mobile echodensity on the right coronary cusp  measuring 0.78 cm x 0.29 cm. This could represent thrombus vs vegetation in  the appropriate clinical context.     Tricuspid Valve  Trace tricuspid insufficiency is present.     Pulmonic Valve  The pulmonic valve cannot be assessed.     Vessels  The inferior vena cava was normal in size with preserved respiratory  variability. The aorta root is normal.     Pericardium  No pericardial effusion is present.        Compared to Previous Study  This study was compared with the study from 9/15/2018 .     Attestation  I have personally viewed the imaging and agree with the interpretation and  report as documented by the fellow, Maribell Hartley, and/or edited  by  me.  _____________________________________________________________________________  __     MMode/2D Measurements & Calculations  IVSd: 0.83 cm  LVIDd: 6.4 cm  LVIDs: 6.2 cm  LVPWd: 1.1 cm  FS: 3.6 %  LV mass(C)d: 274.7 grams  LV mass(C)dI: 120.4 grams/m2  RVOT diam: 2.4 cm  RWT: 0.36        Doppler Measurements & Calculations  MV E max sloane: 72.1 cm/sec  MV A max sloane: 46.4 cm/sec  MV E/A: 1.6  MV dec time: 0.17 sec  PA V2 max: 102.0 cm/sec  PA max P.2 mmHg        _____________________________________________________________________________  __        Report approved by: Shayne Richardson 2018 09:45 AM        Assessment and Plan:    Jim Willingham is a 62 year old male with a past medical history of NICM (EF 20%) s/p HM II LVAD placement who appears well. He is mildly hypokalemic and will begin potassium 20 mEq daily. Repeat a BMP with his next INR and return to clinic in 2 months or as needed.     Right atrial echodensity was more notable on recent echo. I discussed with Soraya Marshall who had previously reviewed with Dr. Montgomery. No need to pursue CHUN at this time.    1.  Chronic systolic heart failure secondary to NICM.    Stage D    NYHA Class II  ACEi/ARB: losartan  BB: Toprol XL 37.5 mg twice daily.  Aldosterone antagonist: Aldactone to 25 mg daily.   SCD prophylaxis: CRT-D  Fluid status:  euvolemic     2.  LVAD S/P HM II LVAD 17:  MAP Well controlled on current regimen.  Anticoagulation: Warfarin INR goal 2-3. Therapeutic today  Antiplatelet:  ASA dose 81 mg daily.  LDH:  3 40 today      3.  CKD Stage III: Creatinine trending down over this past month. Down to 1.3 today.     4.  HTN:  Controlled on Toprol XL and losartan       5.  Paroxysmal Atrial Fibrillation. Anticoagulated and rate controlled with Toprol XL.       25 minuets spent in direct care, >50% in counseling    Please do not hesitate to contact me if you have any questions/concerns.     Sincerely,     Jeanine Wilson NP

## 2018-05-04 NOTE — MR AVS SNAPSHOT
After Visit Summary   5/4/2018    Jim Willingham    MRN: [id number]           Patient Information     Date Of Birth          1955        Visit Information        Provider Department      5/4/2018 10:00 AM Jeanine Wilson NP [facility]        Today's Diagnoses     LVAD (left ventricular assist device) present (H)    -  1    Chronic systolic congestive heart failure (H)          Care Instructions    Medications:  1. START taking Potassium Chloride, 1 packet (20mEq) daily.  2. Please discuss your pain medication regimen with your primary care MD    Follow-up:  1. 7/6 with Dr. Montgomery. We will check with her to see if you need to have a transesophageal echocardiogram at that time.     Instructions:  1. Please call us if your weight starts to get out of range (257-260).     Page the VAD Coordinator on call if you gain more than 3 lb in a day or 5 in a week. Please also page if you feel unwell or have alarms.     Great to see you in clinic today. To Page the VAD Coordinator on call, dial [phone] option #[phone] and ask to speak to the VAD coordinator on call.               Follow-ups after your visit        Your next 10 appointments already scheduled     Jul 06, 2018  8:00 AM CDT   LAB with UU LAB GOLD WAITING   [facility], Lab ([facility])    [address] [address]              Please do not eat 10-12 hours before your appointment if you are coming in fasting for labs on lipids, cholesterol, or glucose (sugar). This does not apply to pregnant women. Water, hot tea and black coffee (with nothing added) are okay. Do not drink other fluids, diet soda or chew gum.            Jul 06, 2018  8:30 AM CDT   Ech Complete Lvad* with UUECHR2   [facility],  Echocardiography ([facility])    [address] [facility] [id number] [phone]           1.  Please bring or wear a  comfortable two-piece outfit. 2.  You may eat, drink and take your normal medicines. 3.  For any questions that cannot be answered, please contact the ordering physician            Jul 06, 2018  9:30 AM CDT   Procedure - 2.5 hour with U2A ROOM 17   [address] [facility] ([facility])    [address] [facility] [address]               Jul 06, 2018 10:30 AM CDT   Heart Cath Right Heart Cath with UUHCVR3   [facility])    [address] [facility] [address]   [phone]            Jul 06, 2018  2:00 PM CDT   (Arrive by 1:45 PM)   Implanted Defibulator with Uc Cv Device 1   [facility] ([facility])    [address] [place] [address]  [address] [place] [id number] [phone]            Jul 06, 2018  2:30 PM CDT   (Arrive by 2:15 PM)   Ventricular Assist Device with Delisa Montgomery MD   [facility])    [address] [place] [address]  [address] [place] [id number] [phone]              Who to contact     If you have questions or need follow up information about today's clinic visit or your schedule please contact [facility] directly at [phone].  Normal or non-critical lab and imaging results will be communicated to you by scroll kithart, letter or phone within 4 business days after the clinic has received the results. If you do not hear from us within 7 days, please contact the clinic through [employer] or phone. If you have a critical or abnormal lab result, we will notify you by phone as soon as possible.  Submit refill requests through [employer] or call your pharmacy and they will forward the refill request to us. Please allow 3 business days for your refill to be completed.          Additional Information About Your Visit        [employer] Information     [employer] lets you send messages to your doctor, view  "your test results, renew your prescriptions, schedule appointments and more. To sign up, go to [email or web address] . Click on \"Log in\" on the left side of the screen, which will take you to the Welcome page. Then click on \"Sign up Now\" on the right side of the page.     You will be asked to enter the access code listed below, as well as some personal information. Please follow the directions to create your username and password.     Your access code is: [email or web address]  Expires: 2018  6:30 AM     Your access code will  in 90 days. If you need help or a new code, please call your [place] clinic or [phone].        Care EveryWhere ID     This is your Care EveryWhere ID. This could be used by other organizations to access your [place] medical records  [id number]        Your Vitals Were     Pulse Temperature Height Pulse Oximetry BMI (Body Mass Index)       76 97.9  F (36.6  C) 1.727 m (5' 8\") 96% 40.87 kg/m2        Blood Pressure from Last 3 Encounters:   18 (!) 76/0   18 (!) 86/0   18 (!) 80/0    Weight from Last 3 Encounters:   18 121.9 kg (268 lb 12.8 oz)   18 124.7 kg (275 lb)   18 125.7 kg (277 lb 1.6 oz)              We Performed the Following     (68002) INTERROGATE VENT ASSIST DEVICE, IN PERSON, DAGO JETER ANALYSIS PARAMETERS          Today's Medication Changes          These changes are accurate as of 18 10:51 AM.  If you have any questions, ask your nurse or doctor.               Start taking these medicines.        Dose/Directions    potassium chloride 20 MEQ Packet   Commonly known as:  KLOR-CON   Used for:  LVAD (left ventricular assist device) present (H), Chronic systolic congestive heart failure (H)   Started by:  Jeanine Wilson NP        Dose:  20 mEq   Take 20 mEq by mouth daily   Quantity:  60 packet   Refills:  5            Where to get your medicines      These medications were sent to [facility] [address] [place] [address] " [address] [facility] [address]  [address], [place] [address]     Phone:  [phone]     potassium chloride 20 MEQ Packet                Primary Care Provider Office Phone # Fax #    Jovany Salas [phone]       [place] [address]  [place] [address]        Equal Access to Services     [facility]: Hadii aad ku hadasho Soomaali, waaxda luqadaha, qaybta kaalmada adeegyada, waxay idiin hayaan adeeg kharash la'aan ah. So [facility] [phone].    ATENCIÓN: Si habla español, tiene a roger disposición servicios gratuitos de asistencia lingüística. Llame al [phone].    We comply with applicable federal civil rights laws and [place] laws. We do not discriminate on the basis of race, color, national origin, age, disability, sex, sexual orientation, or gender identity.            Thank you!     Thank you for choosing [facility]  for your care. Our goal is always to provide you with excellent care. Hearing back from our patients is one way we can continue to improve our services. Please take a few minutes to complete the written survey that you may receive in the mail after your visit with us. Thank you!             Your Updated Medication List - Protect others around you: Learn how to safely use, store and throw away your medicines at www.disposemymeds.org.          This list is accurate as of 5/4/18 10:51 AM.  Always use your most recent med list.                   Brand Name Dispense Instructions for use Diagnosis    acetaminophen 325 MG tablet    TYLENOL    100 tablet    Take 2 tablets (650 mg) by mouth every 6 hours    Acute post-operative pain       ALDACTONE 25 MG tablet   Generic drug:  spironolactone     30 tablet    Take 1 tablet (25 mg) by mouth daily    LVAD (left ventricular assist device) present (H), Chronic systolic congestive heart failure (H), Nerve pain, Acute idiopathic gout, unspecified site       allopurinol 100 MG tablet     ZYLOPRIM    30 tablet    Take 1 tablet (100 mg) by mouth daily    Acute idiopathic gout, unspecified site       aspirin 81 MG chewable tablet     36 tablet    1 tablet (81 mg) by Oral or Feeding Tube route daily    LVAD (left ventricular assist device) present (H)       BREO ELLIPTA 200-25 MCG/INH oral inhaler   Generic drug:  fluticasone-vilanterol      Inhale 1 puff into the lungs daily        bumetanide 1 MG tablet    BUMEX    180 tablet    Take 1 tablet (1 mg) by mouth 2 times daily    LVAD (left ventricular assist device) present (H)       celeXA 20 MG tablet   Generic drug:  citalopram      Take 20 mg by mouth daily        cyanocobalamin 1000 MCG/ML injection    VITAMIN B12     Inject 1,000 mcg into the muscle every 30 days        enoxaparin 120 MG/0.8ML injection    LOVENOX    10 Syringe    Please inject entire contents of Syringe, 120mg of Lovenox subcutaneously every twelve hours daily.    LVAD (left ventricular assist device) present (H), Long-term (current) use of anticoagulants       gabapentin 300 MG capsule    NEURONTIN    90 capsule    Take 1 capsule (300 mg) by mouth 3 times daily    Nerve pain, LVAD (left ventricular assist device) present (H), Acute idiopathic gout, unspecified site, Chronic systolic congestive heart failure (H)       INCRUSE ELLIPTA 62.5 MCG/INH oral inhaler   Generic drug:  umeclidinium      Inhale 1 puff into the lungs daily        ipratropium - albuterol 0.5 mg/2.5 mg/3 mL 0.5-2.5 (3) MG/3ML neb solution    DUONEB     Take 3 mLs by nebulization every 4 hours as needed for shortness of breath / dyspnea or wheezing        losartan 25 MG tablet    COZAAR    90 tablet    Take 1 tablet (25 mg) by mouth daily    LVAD (left ventricular assist device) present (H), Chronic systolic congestive heart failure (H)       metFORMIN 500 MG tablet    GLUCOPHAGE    60 tablet    Take 0.5 tablets (250 mg) by mouth 2 times daily (with meals)    Diabetes mellitus due to underlying condition with  chronic kidney disease, without long-term current use of insulin, unspecified CKD stage (H)       metoprolol succinate 25 MG 24 hr tablet    TOPROL-XL    180 tablet    Take 1.5 tablets (37.5 mg) by mouth 2 times daily    Chronic systolic congestive heart failure (H), LVAD (left ventricular assist device) present (H)       pantoprazole 40 MG EC tablet    PROTONIX    30 tablet    Take 1 tablet (40 mg) by mouth every morning    Gastroesophageal reflux disease without esophagitis       polyethylene glycol Packet    MIRALAX/GLYCOLAX    7 packet    Take 17 g by mouth daily as needed for constipation (for no bm in 1 day)    Acute post-operative pain       potassium chloride 20 MEQ Packet    KLOR-CON    60 packet    Take 20 mEq by mouth daily    LVAD (left ventricular assist device) present (H), Chronic systolic congestive heart failure (H)       predniSONE 20 MG tablet    DELTASONE    20 tablet    Take 0.5 tablets (10 mg) by mouth daily    Acute idiopathic gout, unspecified site, LVAD (left ventricular assist device) present (H), Nerve pain, Chronic systolic congestive heart failure (H)       PROAIR  (90 Base) MCG/ACT Inhaler   Generic drug:  albuterol      Inhale 2 puffs into the lungs every 4 hours as needed for shortness of breath / dyspnea or wheezing        senna-docusate 8.6-50 MG per tablet    SENOKOT-S;PERICOLACE    100 tablet    Take 2 tablets by mouth 2 times daily as needed for constipation    Acute post-operative pain       SINGULAIR 10 MG tablet   Generic drug:  montelukast      Take 10 mg by mouth At Bedtime        traMADol 50 MG tablet    ULTRAM    28 tablet    Take 2 tablets (100 mg) by mouth every 6 hours as needed for moderate pain or breakthrough pain    Acute post-operative pain       traZODone 50 MG tablet    DESYREL    11 tablet    Take 50 mg by mouth nightly x one week then reduce to 25 mg (1/2 tab) by mouth nightly x one week then stop.    Acute post-operative pain, LVAD (left ventricular  assist device) present (H)       * warfarin 3 MG tablet    COUMADIN    30 tablet    Use as directed.    LVAD (left ventricular assist device) present (H)       * warfarin 5 MG tablet    COUMADIN    75 tablet    Take 2-2.5 tablets daily or as directed by coumadin clinic.    LVAD (left ventricular assist device) present (H), Chronic systolic congestive heart failure (H)       zinc sulfate 220 (50 Zn) MG capsule    ZINCATE     Take 220 mg by mouth 2 times daily        * Notice:  This list has 2 medication(s) that are the same as other medications prescribed for you. Read the directions carefully, and ask your doctor or other care provider to review them with you.

## 2018-05-04 NOTE — NURSING NOTE
1). PUMP DATA  Primary controller serial number: [id number]     II:   Flow: 5.4 L/min,    Speed: 9600 RPMs,     PI: 5.7 ,  Power: 6.1 Manuel,      Primary controller   Back up battery: Patient use: 52, Replace in: 19  Months     Data downloaded: No   Equipment and driveline assessed for damage: Yes     Back up : Serial number: [id number]  Back up battery: Patient use: 4 Replace in: 21  Months  Programmed settings identical to the settings on the primary controller :Yes      Education complete: Yes   Charge the BACKUP controller s backup battery every 6 months  Perform a self test on BACKUP every 6 months  Change the MPU s batteries every 6 months:Yes  Have equipment serviced yearly (if applicable):Yes    2). ALARMS  Alarms reported by patient since last pump evaluation: No  Alarms or other finding noted during pump data history and alarm download: Yes - several PI events. Discussed with NP - likely due to dehydration that has since resolved.    Action Taken:  Reviewed data with patient: Yes      3). DRESSING CHANGE / DRIVELINE ASSESSMENT  Dressing change completed today: No  Appearance of Driveline site: c/d/i per pt report    Driveline stabilization: Method: [employer]  [ Teaching reinforced on need for stabilization of Driveline. ]

## 2018-05-04 NOTE — MR AVS SNAPSHOT
Jim Willingham   5/4/2018   Anticoagulation Therapy Visit    Description:  62 year old male   Provider:  Brendan Montoya   Department:  [facility]           INR as of 5/4/2018     Today's INR 2.16      Anticoagulation Summary as of 5/4/2018     INR goal 2.0-3.0   Today's INR 2.16   Full instructions 5/4: 12.5 mg; 5/5: 10 mg; 5/6: 10 mg; 5/7: 10 mg; 5/8: 10 mg; 5/9: 10 mg; 5/10: 10 mg   Next INR check 5/11/2018    Indications   LVAD (left ventricular assist device) present (H) [Z95.811]  Long-term (current) use of anticoagulants [Z79.01] [Z79.01]         May 2018 Details    Sun Mon Tue Wed Thu Fri Sat       1               2               3               4      12.5 mg   See details      5      10 mg           6      10 mg         7      10 mg         8      10 mg         9      10 mg         10      10 mg         11            12                 13               14               15               16               17               18               19                 20               21               22               23               24               25               26                 27               28               29               30               31                  Date Details   05/04 This INR check       Date of next INR:  5/11/2018         How to take your warfarin dose     To take:  10 mg Take 2 of the 5 mg tablets.    To take:  12.5 mg Take 2.5 of the 5 mg tablets.

## 2018-05-04 NOTE — PROGRESS NOTES
HPI:   Jim Willingham is a 62 year old male with a past medical history of NICM (EF 20%) s/p HM II LVAD placement (6/19/17) complicated by arrhythmias, WALTER, and small left pleural effusion, CKD Stage III, DM Type II, CECILIA, obesity, HTN, COPD, and asthma who is here for heart failure and LVAD follow up. He was last seen in clinic a month ago when he appeared volume up. He also had an echodensity noted on echo. Since then he has had a GI bug prompting a decrease in his diuretics. He returns for follow up.    Jim is feeling better with less lightheadedness. He is still short of breath with activity such as taking the garbage out or carrying his granddaughter. The dyspnea is still improved. No orthopnea or PND. Using CPAP. No chest pain, palpitations, syncope, no lower extremity edema though has noted some hand swelling.     Weights at home have been 257-260 pounds at home over the last 2 weeks.     Jim denies fevers, chills, driveline redness, tenderness, or discharge. Unaware of any LVAD alarms and denies any stroke symptoms or blood in the urine or stools. INRs have been therapeutic.     PAST MEDICAL HISTORY:  Past Medical History:   Diagnosis Date     Benign essential hypertension 5/11/2017     Cardiomyopathy, unspecified 5/8/2017     CKD (chronic kidney disease) stage 3, GFR 30-59 ml/min 5/11/2017     Depression 5/11/2017     Diabetes mellitus (H) 5/11/2017     H/O gastric bypass 5/11/2017     ICD (implantable cardioverter-defibrillator), biventricular, in situ 5/11/2017     NICM (nonischemic cardiomyopathy) (H)/ EF 20% 5/11/2017    ECHO: LVEDd. 7.66 cm, Restrictive pattern , Severe mitral valve regurgitation     CECILIA (obstructive sleep apnea) 5/11/2017     Paroxysmal atrial fibrillation (H) 5/11/2017     Paroxysmal VT (H) 5/11/2017     Vitamin B12 deficiency (non anemic) 5/11/2017       FAMILY HISTORY:  Family History   Problem Relation Age of Onset     CEREBROVASCULAR DISEASE Mother      DIABETES Mother       Hypertension Mother      Coronary Artery Disease Father      Type 2 Diabetes Father      SOCIAL HISTORY:  Social History     Social History     Marital status:      Spouse name: N/A     Number of children: N/A     Years of education: N/A     Social History Main Topics     Smoking status: Former Smoker     Quit date: 1995     Smokeless tobacco: Not on file     Alcohol use No     Drug use: Not on file     Sexual activity: Yes     Partners: Female     Other Topics Concern     Parent/Sibling W/ Cabg, Mi Or Angioplasty Before 65f 55m? No     Social History Narrative       CURRENT MEDICATIONS:  Current Outpatient Prescriptions on File Prior to Visit:  acetaminophen (TYLENOL) 325 MG tablet Take 2 tablets (650 mg) by mouth every 6 hours   albuterol (ALBUTEROL) 108 (90 BASE) MCG/ACT Inhaler Inhale 2 puffs into the lungs every 4 hours as needed for shortness of breath / dyspnea or wheezing   allopurinol (ZYLOPRIM) 100 MG tablet Take 1 tablet (100 mg) by mouth daily   aspirin 81 MG chewable tablet 1 tablet (81 mg) by Oral or Feeding Tube route daily   bumetanide (BUMEX) 1 MG tablet Take 1 tablet (1 mg) by mouth 2 times daily   citalopram (CELEXA) 20 MG tablet Take 20 mg by mouth daily   cyanocobalamin (VITAMIN B12) 1000 MCG/ML injection Inject 1,000 mcg into the muscle every 30 days   fluticasone-vilanterol (BREO ELLIPTA) 200-25 MCG/INH oral inhaler Inhale 1 puff into the lungs daily   gabapentin (NEURONTIN) 300 MG capsule Take 1 capsule (300 mg) by mouth 3 times daily   ipratropium - albuterol 0.5 mg/2.5 mg/3 mL (DUONEB) 0.5-2.5 (3) MG/3ML neb solution Take 3 mLs by nebulization every 4 hours as needed for shortness of breath / dyspnea or wheezing   losartan (COZAAR) 25 MG tablet Take 1 tablet (25 mg) by mouth daily   metFORMIN (GLUCOPHAGE) 500 MG tablet Take 0.5 tablets (250 mg) by mouth 2 times daily (with meals)   metoprolol succinate (TOPROL-XL) 25 MG 24 hr tablet Take 1.5 tablets (37.5 mg) by mouth 2 times daily  "  montelukast (SINGULAIR) 10 MG tablet Take 10 mg by mouth At Bedtime   pantoprazole (PROTONIX) 40 MG EC tablet Take 1 tablet (40 mg) by mouth every morning   polyethylene glycol (MIRALAX/GLYCOLAX) Packet Take 17 g by mouth daily as needed for constipation (for no bm in 1 day)   predniSONE (DELTASONE) 20 MG tablet Take 0.5 tablets (10 mg) by mouth daily   senna-docusate (SENOKOT-S;PERICOLACE) 8.6-50 MG per tablet Take 2 tablets by mouth 2 times daily as needed for constipation   spironolactone (ALDACTONE) 25 MG tablet Take 1 tablet (25 mg) by mouth daily   traMADol (ULTRAM) 50 MG tablet Take 2 tablets (100 mg) by mouth every 6 hours as needed for moderate pain or breakthrough pain   umeclidinium (INCRUSE ELLIPTA) 62.5 MCG/INH oral inhaler Inhale 1 puff into the lungs daily   warfarin (COUMADIN) 3 MG tablet Use as directed.   warfarin (COUMADIN) 5 MG tablet Take 2-2.5 tablets daily or as directed by coumadin clinic.   zinc sulfate (ZINCATE) 220 (50 ZN) MG capsule Take 220 mg by mouth 2 times daily   enoxaparin (LOVENOX) 120 MG/0.8ML injection Please inject entire contents of Syringe, 120mg of Lovenox subcutaneously every twelve hours daily. (Patient not taking: Reported on 3/19/2018)   traZODone (DESYREL) 50 MG tablet Take 50 mg by mouth nightly x one week then reduce to 25 mg (1/2 tab) by mouth nightly x one week then stop. (Patient not taking: Reported on 8/11/2017)     No current facility-administered medications on file prior to visit.     EXAM:  BP (!) 76/0  Pulse 76  Temp 97.9  F (36.6  C)  Ht 1.727 m (5' 8\")  Wt 121.9 kg (268 lb 12.8 oz)  SpO2 96%  BMI 40.87 kg/m2  General: appears comfortable, alert and articulate  Head: normocephalic, atraumatic  Eyes: anicteric sclera, EOMI  Neck: no adenopathy, neck supple  Orophyarynx: moist mucosa, no lesions, dentition intact  Heart: LVAD hum present, no murmur, gallop, rub.  No appreciable JVD.  Lungs: clear, no rales or wheezing  Abdomen: soft, non-tender, bowel " sounds present, no hepatomegaly.    Extremities: no clubbing, cyanosis or edema.    Neurological: normal speech and affect, no gross motor deficits      VAD Interrogation on 18: VAD interrogation reviewed with VAD coordinator. Agree with findings. Frequent PI events, particularly prior to May 1, rarely accompanied with speed changes. No power spikes, speed drops, or other findings suspicious of pump malfunction noted.     Labs:  Reviewed CBC, CMP, LDH, INR,     Most recent echocardiogram:  Recent Results (from the past 4320 hour(s))   Echo LVAD Complete *    Narrative    [id number]  [id number]  [id number]  [id number]^KEL^BRITNEY^ANGELIA           [facility],[place]  Echocardiography Laboratory  [address] [place] [address]     Name: LOIS FULLER  MRN: [id number]  : 1955  Study Date: 2018 07:31 AM  Age: 62 yrs  Gender: Male  Patient Location: [facility]  Reason For Study: LVAD (left ventricular assist device) present (H),  Ordering Physician: BRITNEY BEGUM  Referring Physician: BRITNEY BEGUM  Performed By: Rhiannon Love RDCS, RVT     BSA: 2.3 m2  Height: 68 in  Weight: 259 lb  _____________________________________________________________________________  __        Procedure  LVAD Echocardiogram with two-dimensional, color and spectral Doppler  performed.  _____________________________________________________________________________  __        Interpretation Summary  HMII 9600 RPM  Normal inflow and outflow velocities. Left ventricular wall thickness is  normal. Moderate left ventricular dilation is present. The Ejection Fraction  is estimated at 5-10%. Severe diffuse hypokinesis is present.  Mild to moderate right ventricular dilation is present. Global right  ventricular function is moderately to severely reduced.  The aortic valve opens partially with each systole.  There is a mobile echodensity on the right coronary cusp measuring 0.78 cm x  0.29 cm. This  echodensity is more prominent when compared to prior study.Could  be aortic valve sclerosis,but cannot rule out other pathology.  _____________________________________________________________________________  __        Left Ventricle  Left ventricular wall thickness is normal. Moderate left ventricular dilation  is present. The Ejection Fraction is estimated at 5-10%. Severe diffuse  hypokinesis is present.     Right Ventricle  Mild to moderate right ventricular dilation is present. Global right  ventricular function is moderately to severely reduced. A pacemaker lead is  noted in the right ventricle.     Atria  Atria are not well visualized but appear to be at least mildly enlarged.        Mitral Valve  Trace mitral insufficiency is present.     Aortic Valve  The aortic valve opens partially and there is minimal flow through the aortic  valve during systole. There is a mobile echodensity on the right coronary cusp  measuring 0.78 cm x 0.29 cm. This could represent thrombus vs vegetation in  the appropriate clinical context.     Tricuspid Valve  Trace tricuspid insufficiency is present.     Pulmonic Valve  The pulmonic valve cannot be assessed.     Vessels  The inferior vena cava was normal in size with preserved respiratory  variability. The aorta root is normal.     Pericardium  No pericardial effusion is present.        Compared to Previous Study  This study was compared with the study from 9/15/2018 .     Attestation  I have personally viewed the imaging and agree with the interpretation and  report as documented by the fellow, Maribell Hartley, and/or edited by  me.  _____________________________________________________________________________  __     MMode/2D Measurements & Calculations  IVSd: 0.83 cm  LVIDd: 6.4 cm  LVIDs: 6.2 cm  LVPWd: 1.1 cm  FS: 3.6 %  LV mass(C)d: 274.7 grams  LV mass(C)dI: 120.4 grams/m2  RVOT diam: 2.4 cm  RWT: 0.36        Doppler Measurements & Calculations  MV E max sloane: 72.1  cm/sec  MV A max sloane: 46.4 cm/sec  MV E/A: 1.6  MV dec time: 0.17 sec  PA V2 max: 102.0 cm/sec  PA max P.2 mmHg        _____________________________________________________________________________  __        Report approved by: Shayne Richardson 2018 09:45 AM            Assessment and Plan:    Jim Willingham is a 62 year old male with a past medical history of NICM (EF 20%) s/p HM II LVAD placement who appears well. He is mildly hypokalemic and will begin potassium 20 mEq daily. Repeat a BMP with his next INR and return to clinic in 2 months or as needed.     Right atrial echodensity was more notable on recent echo. I discussed with Soraya Marshall who had previously reviewed with Dr. Montgomery. No need to pursue CHUN at this time.    1.  Chronic systolic heart failure secondary to NICM.    Stage D    NYHA Class II  ACEi/ARB: losartan  BB: Toprol XL 37.5 mg twice daily.  Aldosterone antagonist: Aldactone to 25 mg daily.   SCD prophylaxis: CRT-D  Fluid status: euvolemic     2.  LVAD S/P HM II LVAD 17:  MAP Well controlled on current regimen.  Anticoagulation: Warfarin INR goal 2-3. Therapeutic today  Antiplatelet:  ASA dose 81 mg daily.  LDH:  340 today      3.  CKD Stage III: Creatinine trending down over this past month. Down to 1.3 today.     4.  HTN:  Controlled on Toprol XL and losartan       5.  Paroxysmal Atrial Fibrillation. Anticoagulated and rate controlled with Toprol XL.            25 minuets spent in direct care, >50% in counseling

## 2018-05-04 NOTE — PATIENT INSTRUCTIONS
Medications:  1. START taking Potassium Chloride, 1 packet (20mEq) daily.  2. Please discuss your pain medication regimen with your primary care MD    Follow-up:  1. 7/6 with Dr. Montgomery. We will check with her to see if you need to have a transesophageal echocardiogram at that time.     Instructions:  1. Please call us if your weight starts to get out of range (257-260).     Page the VAD Coordinator on call if you gain more than 3 lb in a day or 5 in a week. Please also page if you feel unwell or have alarms.     Great to see you in clinic today. To Page the VAD Coordinator on call, dial [phone] option #4 and ask to speak to the VAD coordinator on call.

## 2018-05-04 NOTE — NURSING NOTE
Chief Complaint   Patient presents with     Follow Up For     VAD FU return with labs prior. INTERMACS Questionnaires and Trail Making needed     Vitals were taken and medications were reconciled.  Cristhian Gipson, RMA  9:59 AM

## 2018-05-09 DIAGNOSIS — M10.00 ACUTE IDIOPATHIC GOUT, UNSPECIFIED SITE: ICD-10-CM

## 2018-05-11 ENCOUNTER — ANTICOAGULATION THERAPY VISIT (OUTPATIENT)
Dept: ANTICOAGULATION | Facility: CLINIC | Age: 63
End: 2018-05-11

## 2018-05-11 DIAGNOSIS — Z95.811 LVAD (LEFT VENTRICULAR ASSIST DEVICE) PRESENT (H): Primary | ICD-10-CM

## 2018-05-11 DIAGNOSIS — Z95.811 LVAD (LEFT VENTRICULAR ASSIST DEVICE) PRESENT (H): ICD-10-CM

## 2018-05-11 DIAGNOSIS — Z79.01 LONG-TERM (CURRENT) USE OF ANTICOAGULANTS: ICD-10-CM

## 2018-05-11 LAB
INR BLD: NORMAL (ref 0.86–1.14)
INR PPP: 2.09 (ref 0.86–1.14)

## 2018-05-11 PROCEDURE — 85610 PROTHROMBIN TIME: CPT | Performed by: INTERNAL MEDICINE

## 2018-05-11 PROCEDURE — 36416 COLLJ CAPILLARY BLOOD SPEC: CPT | Performed by: INTERNAL MEDICINE

## 2018-05-11 NOTE — MR AVS SNAPSHOT
Jim Willingham   5/11/2018   Anticoagulation Therapy Visit    Description:  62 year old male   Provider:  Delisa Hillman RN   Department:  [facility]           INR as of 5/11/2018     Today's INR 2.09      Anticoagulation Summary as of 5/11/2018     INR goal 2.0-3.0   Today's INR 2.09   Full instructions 5/11: 12.5 mg; 5/12: 10 mg; 5/13: 10 mg; 5/14: 10 mg; 5/15: 10 mg; 5/16: 10 mg; 5/17: 10 mg   Next INR check 5/18/2018    Indications   LVAD (left ventricular assist device) present (H) [Z95.811]  Long-term (current) use of anticoagulants [Z79.01] [Z79.01]         May 2018 Details    Sun Mon Tue Wed Thu Fri Sat       1               2               3               4               5                 6               7               8               9               10               11      12.5 mg   See details      12      10 mg           13      10 mg         14      10 mg         15      10 mg         16      10 mg         17      10 mg         18            19                 20               21               22               23               24               25               26                 27               28               29               30               31                  Date Details   05/11 This INR check       Date of next INR:  5/18/2018         How to take your warfarin dose     To take:  10 mg Take 2 of the 5 mg tablets.    To take:  12.5 mg Take 2.5 of the 5 mg tablets.

## 2018-05-11 NOTE — PROGRESS NOTES
ANTICOAGULATION FOLLOW-UP CLINIC VISIT    Patient Name:  Jim Willingham  Date:  5/11/2018  Contact Type:  Telephone    SUBJECTIVE:        OBJECTIVE    INR   Date Value Ref Range Status   05/11/2018 2.09 (H) 0.86 - 1.14 Final     INR Point of Care   Date Value Ref Range Status   05/11/2018 Canceled, Test credited 0.86 - 1.14 Final       ASSESSMENT / PLAN  INR assessment THER    Recheck INR In: 1 WEEK    INR Location Clinic      Anticoagulation Summary as of 5/11/2018     INR goal 2.0-3.0   Today's INR 2.09   Maintenance plan No maintenance plan   Full instructions 5/11: 12.5 mg; 5/12: 10 mg; 5/13: 10 mg; 5/14: 10 mg; 5/15: 10 mg; 5/16: 10 mg; 5/17: 10 mg   Weekly total 62.5 mg   Plan last modified Maru Alonso RN (3/28/2018)   Next INR check 5/18/2018   Priority INR   Target end date     Indications   LVAD (left ventricular assist device) present (H) [Z95.811]  Long-term (current) use of anticoagulants [Z79.01] [Z79.01]         Anticoagulation Episode Summary     INR check location     Preferred lab     Send INR reminders to [facility]    Comments HIPPA Forms mailed 6/26/17  Labs drawn either at [facility] or [facility]      Anticoagulation Care Providers     Provider Role Specialty Phone number    UlisesDelisa MD Responsible Cardiology [phone]            See the Encounter Report to view Anticoagulation Flowsheet and Dosing Calendar (Go to Encounters tab in chart review, and find the Anticoagulation Therapy Visit)    Spoke with patient.    Delisa Hillman RN

## 2018-05-16 RX ORDER — ALLOPURINOL 100 MG/1
100 TABLET ORAL DAILY
Qty: 60 TABLET | Refills: 5 | Status: ON HOLD | OUTPATIENT
Start: 2018-05-16 | End: 2020-09-27

## 2018-05-18 ENCOUNTER — ANTICOAGULATION THERAPY VISIT (OUTPATIENT)
Dept: ANTICOAGULATION | Facility: CLINIC | Age: 63
End: 2018-05-18

## 2018-05-18 DIAGNOSIS — Z79.01 LONG-TERM (CURRENT) USE OF ANTICOAGULANTS: ICD-10-CM

## 2018-05-18 DIAGNOSIS — Z95.811 LVAD (LEFT VENTRICULAR ASSIST DEVICE) PRESENT (H): ICD-10-CM

## 2018-05-18 LAB — INR BLD: 4.2 (ref 0.86–1.14)

## 2018-05-18 PROCEDURE — 36416 COLLJ CAPILLARY BLOOD SPEC: CPT | Performed by: INTERNAL MEDICINE

## 2018-05-18 PROCEDURE — 85610 PROTHROMBIN TIME: CPT | Mod: QW | Performed by: INTERNAL MEDICINE

## 2018-05-18 NOTE — PROGRESS NOTES
ANTICOAGULATION FOLLOW-UP CLINIC VISIT    Patient Name:  Jim Willingham  Date:  5/18/2018  Contact Type:  Telephone    SUBJECTIVE:     Patient Findings     Comments Encouraged extra greens over the weekend.           OBJECTIVE    INR Point of Care   Date Value Ref Range Status   05/18/2018 4.2 (H) 0.86 - 1.14 Final     Comment:     This test is intended for monitoring Coumadin therapy.  Results are not   accurate in patients with prolonged INR due to factor deficiency.         ASSESSMENT / PLAN  No question data found.  Anticoagulation Summary as of 5/18/2018     INR goal 2.0-3.0   Today's INR 4.2!   Maintenance plan No maintenance plan   Full instructions 5/18: 7.5 mg; 5/19: 10 mg; 5/20: 10 mg   Weekly total 62.5 mg   Plan last modified Maru Alonso RN (3/28/2018)   Next INR check 5/21/2018   Priority INR   Target end date     Indications   LVAD (left ventricular assist device) present (H) [Z95.811]  Long-term (current) use of anticoagulants [Z79.01] [Z79.01]         Anticoagulation Episode Summary     INR check location     Preferred lab     Send INR reminders to [facility]    Comments HIPPA Forms mailed 6/26/17  Labs drawn either at [facility] or [facility]      Anticoagulation Care Providers     Provider Role Specialty Phone number    Delisa Montgomery MD Responsible Cardiology [phone]            See the Encounter Report to view Anticoagulation Flowsheet and Dosing Calendar (Go to Encounters tab in chart review, and find the Anticoagulation Therapy Visit)    Left message for patient with results and dosing recommendations. Asked patient to call back to report any missed doses, falls, signs and symptoms of bleeding or clotting, any changes in health, medication, or diet. Asked patient to call back with any questions or concerns.     Maru Alonso, REGINALD

## 2018-05-18 NOTE — MR AVS SNAPSHOT
Jim Willingham   5/18/2018   Anticoagulation Therapy Visit    Description:  62 year old male   Provider:  Maru Alonso, RN   Department:  [facility]           INR as of 5/18/2018     Today's INR 4.2!      Anticoagulation Summary as of 5/18/2018     INR goal 2.0-3.0   Today's INR 4.2!   Full instructions 5/18: 7.5 mg; 5/19: 10 mg; 5/20: 10 mg   Next INR check 5/21/2018    Indications   LVAD (left ventricular assist device) present (H) [Z95.811]  Long-term (current) use of anticoagulants [Z79.01] [Z79.01]         May 2018 Details    Sun Mon Tue Wed Thu Fri Sat       1               2               3               4               5                 6               7               8               9               10               11               12                 13               14               15               16               17               18      7.5 mg   See details      19      10 mg           20      10 mg         21            22               23               24               25               26                 27               28               29               30               31                  Date Details   05/18 This INR check       Date of next INR:  5/21/2018         How to take your warfarin dose     To take:  7.5 mg Take 2.5 of the 3 mg tablets.    To take:  10 mg Take 2 of the 5 mg tablets.

## 2018-05-21 ENCOUNTER — ANTICOAGULATION THERAPY VISIT (OUTPATIENT)
Dept: ANTICOAGULATION | Facility: CLINIC | Age: 63
End: 2018-05-21

## 2018-05-21 DIAGNOSIS — Z79.01 LONG-TERM (CURRENT) USE OF ANTICOAGULANTS: ICD-10-CM

## 2018-05-21 DIAGNOSIS — Z95.811 LVAD (LEFT VENTRICULAR ASSIST DEVICE) PRESENT (H): ICD-10-CM

## 2018-05-21 LAB — INR PPP: 4 (ref 0.86–1.14)

## 2018-05-21 PROCEDURE — 85610 PROTHROMBIN TIME: CPT | Performed by: INTERNAL MEDICINE

## 2018-05-21 PROCEDURE — 36415 COLL VENOUS BLD VENIPUNCTURE: CPT | Performed by: INTERNAL MEDICINE

## 2018-05-21 NOTE — MR AVS SNAPSHOT
Jim Willingham   5/21/2018   Anticoagulation Therapy Visit    Description:  62 year old male   Provider:  Maru Alonso, RN   Department:  [facility]           INR as of 5/21/2018     Today's INR 4.00!      Anticoagulation Summary as of 5/21/2018     INR goal 2.0-3.0   Today's INR 4.00!   Full instructions 5/21: 7.5 mg; 5/22: 7.5 mg; 5/23: 7.5 mg   Next INR check 5/24/2018    Indications   LVAD (left ventricular assist device) present (H) [Z95.811]  Long-term (current) use of anticoagulants [Z79.01] [Z79.01]         May 2018 Details    Sun Mon Tue Wed Thu Fri Sat       1               2               3               4               5                 6               7               8               9               10               11               12                 13               14               15               16               17               18               19                 20               21      7.5 mg   See details      22      7.5 mg         23      7.5 mg         24            25               26                 27               28               29               30               31                  Date Details   05/21 This INR check       Date of next INR:  5/24/2018         How to take your warfarin dose     To take:  7.5 mg Take 1.5 of the 5 mg tablets.

## 2018-05-21 NOTE — PROGRESS NOTES
ANTICOAGULATION FOLLOW-UP CLINIC VISIT    Patient Name:  Jim Willingham  Date:  5/21/2018  Contact Type:  Telephone    SUBJECTIVE:     Patient Findings     Positives Unexplained INR or factor level change           OBJECTIVE    INR   Date Value Ref Range Status   05/21/2018 4.00 (H) 0.86 - 1.14 Final       ASSESSMENT / PLAN  No question data found.  Anticoagulation Summary as of 5/21/2018     INR goal 2.0-3.0   Today's INR 4.00!   Maintenance plan No maintenance plan   Full instructions 5/21: 7.5 mg; 5/22: 7.5 mg; 5/23: 7.5 mg   Weekly total 62.5 mg   Plan last modified Maru Alonso, RN (3/28/2018)   Next INR check 5/24/2018   Priority INR   Target end date     Indications   LVAD (left ventricular assist device) present (H) [Z95.811]  Long-term (current) use of anticoagulants [Z79.01] [Z79.01]         Anticoagulation Episode Summary     INR check location     Preferred lab     Send INR reminders to [facility]    Comments HIPPA Forms mailed 6/26/17  Labs drawn either at [facility] or [facility]      Anticoagulation Care Providers     Provider Role Specialty Phone number    Delisa Montgomery MD Responsible Cardiology [phone]            See the Encounter Report to view Anticoagulation Flowsheet and Dosing Calendar (Go to Encounters tab in chart review, and find the Anticoagulation Therapy Visit)    Spoke with Tatyana Alonso, REGINALD

## 2018-05-24 ENCOUNTER — ANTICOAGULATION THERAPY VISIT (OUTPATIENT)
Dept: ANTICOAGULATION | Facility: CLINIC | Age: 63
End: 2018-05-24

## 2018-05-24 DIAGNOSIS — Z79.01 LONG-TERM (CURRENT) USE OF ANTICOAGULANTS: ICD-10-CM

## 2018-05-24 DIAGNOSIS — Z95.811 LVAD (LEFT VENTRICULAR ASSIST DEVICE) PRESENT (H): ICD-10-CM

## 2018-05-24 NOTE — PROGRESS NOTES
ANTICOAGULATION FOLLOW-UP CLINIC VISIT    Patient Name:  Jim Willingham  Date:  5/24/2018  Contact Type:  Telephone    SUBJECTIVE:     Patient Findings     Comments Pt is phoned & reminded he is due for an INR today.  He will get this on 5/25.  Dosing instructions given for today only            OBJECTIVE    INR   Date Value Ref Range Status   05/21/2018 4.00 (H) 0.86 - 1.14 Final       ASSESSMENT / PLAN  No question data found.  Anticoagulation Summary as of 5/24/2018     INR goal 2.0-3.0   Today's INR No new INR was available at the time of this encounter.   Warfarin maintenance plan No maintenance plan   Full warfarin instructions 5/24: 7.5 mg   Weekly warfarin total 62.5 mg   Plan last modified Maru Alonso RN (3/28/2018)   Next INR check 5/25/2018   Priority INR   Target end date     Indications   LVAD (left ventricular assist device) present (H) [Z95.811]  Long-term (current) use of anticoagulants [Z79.01] [Z79.01]         Anticoagulation Episode Summary     INR check location     Preferred lab     Send INR reminders to [facility]    Comments HIPPA Forms mailed 6/26/17  Labs drawn either at [facility] or [facility]      Anticoagulation Care Providers     Provider Role Specialty Phone number    Delisa Montgomery MD Responsible Cardiology [phone]            See the Encounter Report to view Anticoagulation Flowsheet and Dosing Calendar (Go to Encounters tab in chart review, and find the Anticoagulation Therapy Visit)    See above notation.  Pt forgot he was to get the INR today     Suyapa Crawford RN

## 2018-05-24 NOTE — MR AVS SNAPSHOT
Jim Willingham   5/24/2018   Anticoagulation Therapy Visit    Description:  62 year old male   Provider:  Suyapa Crawford, RN   Department:  [facility]           INR as of 5/24/2018     Today's INR No new INR was available at the time of this encounter.      Anticoagulation Summary as of 5/24/2018     INR goal 2.0-3.0   Today's INR No new INR was available at the time of this encounter.   Full warfarin instructions 5/24: 7.5 mg   Next INR check 5/25/2018    Indications   LVAD (left ventricular assist device) present (H) [Z95.811]  Long-term (current) use of anticoagulants [Z79.01] [Z79.01]         May 2018 Details    Sun Mon Tue Wed Thu Fri Sat       1               2               3               4               5                 6               7               8               9               10               11               12                 13               14               15               16               17               18               19                 20               21               22               23               24      7.5 mg   See details      25            26                 27               28               29               30               31                  Date Details   05/24 This INR check       Date of next INR:  5/25/2018         How to take your warfarin dose     To take:  7.5 mg Take 1.5 of the 5 mg tablets.

## 2018-05-25 ENCOUNTER — TELEPHONE (OUTPATIENT)
Dept: PHARMACY | Facility: CLINIC | Age: 63
End: 2018-05-25

## 2018-05-25 ENCOUNTER — ANTICOAGULATION THERAPY VISIT (OUTPATIENT)
Dept: ANTICOAGULATION | Facility: CLINIC | Age: 63
End: 2018-05-25

## 2018-05-25 DIAGNOSIS — Z79.01 LONG-TERM (CURRENT) USE OF ANTICOAGULANTS: ICD-10-CM

## 2018-05-25 DIAGNOSIS — Z95.811 LVAD (LEFT VENTRICULAR ASSIST DEVICE) PRESENT (H): ICD-10-CM

## 2018-05-25 LAB
INR BLD: 5.2 (ref 0.86–1.14)
INR PPP: 4.06 (ref 0.86–1.14)

## 2018-05-25 PROCEDURE — 36416 COLLJ CAPILLARY BLOOD SPEC: CPT | Performed by: INTERNAL MEDICINE

## 2018-05-25 PROCEDURE — 85610 PROTHROMBIN TIME: CPT | Mod: QW | Performed by: INTERNAL MEDICINE

## 2018-05-25 PROCEDURE — 85610 PROTHROMBIN TIME: CPT | Performed by: INTERNAL MEDICINE

## 2018-05-25 NOTE — PROGRESS NOTES
ANTICOAGULATION FOLLOW-UP CLINIC VISIT    Patient Name:  Jim Willingham  Date:  5/25/2018  Contact Type:  Telephone    SUBJECTIVE:     Patient Findings     Positives Change in medications (started clindamycin for tooth infection on 5/20/18;  taking tylenol 3000 mg daily and oxycodone 2 tabs/day for tooth pain )    Comments Jim's INR today is 5.2.  He is at the dentist now for tooth pain.  His INR was a POC, so he will go back to clinic after dentist and have a venous draw done.  Will speak with Jim after venous result gets back.  Reviewed signs of bleeding with him and advised he be seen urgently in the ER if he develops any symptoms or if he falls.    Venous INR:  4.06.  Spoke with Jim.  He saw dentist and will continue on clindamycin.  He will continue to use tylenol, so will decrease warfarin dosing and keep a close watch on INR.              OBJECTIVE    INR   Date Value Ref Range Status   05/25/2018 4.06 (H) 0.86 - 1.14 Final       ASSESSMENT / PLAN  INR assessment SUPRA    Recheck INR In: 4 DAYS    INR Location Clinic      Anticoagulation Summary as of 5/25/2018     INR goal 2.0-3.0   Today's INR 4.06!   Warfarin maintenance plan No maintenance plan   Full warfarin instructions 5/25: 5 mg; 5/26: 5 mg; 5/27: 7.5 mg; 5/28: 7.5 mg   Weekly warfarin total 62.5 mg   Plan last modified Maru Alonso RN (3/28/2018)   Next INR check 5/29/2018   Priority INR   Target end date     Indications   LVAD (left ventricular assist device) present (H) [Z95.811]  Long-term (current) use of anticoagulants [Z79.01] [Z79.01]         Anticoagulation Episode Summary     INR check location     Preferred lab     Send INR reminders to [facility]    Comments HIPPA Forms mailed 6/26/17  Labs drawn either at [facility] or [facility]      Anticoagulation Care Providers     Provider Role Specialty Phone number    Delisa Montgomery MD Responsible Cardiology [phone]            See the Encounter  Report to view Anticoagulation Flowsheet and Dosing Calendar (Go to Encounters tab in chart review, and find the Anticoagulation Therapy Visit)    Spoke with Jim.  See patient findings.    Discussed with LVAD coordinator, Janell Bermudez RN

## 2018-05-25 NOTE — MR AVS SNAPSHOT
Jim Willingham   5/25/2018   Anticoagulation Therapy Visit    Description:  62 year old male   Provider:  Kirsty Bermudez, RN   Department:  [facility]           INR as of 5/25/2018     Today's INR 4.06!      Anticoagulation Summary as of 5/25/2018     INR goal 2.0-3.0   Today's INR 4.06!   Full warfarin instructions 5/25: 5 mg; 5/26: 5 mg; 5/27: 7.5 mg; 5/28: 7.5 mg   Next INR check 5/29/2018    Indications   LVAD (left ventricular assist device) present (H) [Z95.811]  Long-term (current) use of anticoagulants [Z79.01] [Z79.01]         May 2018 Details    Sun Mon Tue Wed Thu Fri Sat       1               2               3               4               5                 6               7               8               9               10               11               12                 13               14               15               16               17               18               19                 20               21               22               23               24               25      5 mg   See details      26      5 mg           27      7.5 mg         28      7.5 mg         29            30               31                  Date Details   05/25 This INR check       Date of next INR:  5/29/2018         How to take your warfarin dose     To take:  5 mg Take 1 of the 5 mg tablets.    To take:  7.5 mg Take 1.5 of the 5 mg tablets.

## 2018-05-29 ENCOUNTER — ANTICOAGULATION THERAPY VISIT (OUTPATIENT)
Dept: ANTICOAGULATION | Facility: CLINIC | Age: 63
End: 2018-05-29

## 2018-05-29 DIAGNOSIS — Z79.01 LONG-TERM (CURRENT) USE OF ANTICOAGULANTS: ICD-10-CM

## 2018-05-29 DIAGNOSIS — Z95.811 LVAD (LEFT VENTRICULAR ASSIST DEVICE) PRESENT (H): ICD-10-CM

## 2018-05-29 LAB — INR PPP: 2.42 (ref 0.86–1.14)

## 2018-05-29 PROCEDURE — 85610 PROTHROMBIN TIME: CPT | Performed by: INTERNAL MEDICINE

## 2018-05-29 PROCEDURE — 36415 COLL VENOUS BLD VENIPUNCTURE: CPT | Performed by: INTERNAL MEDICINE

## 2018-05-29 NOTE — MR AVS SNAPSHOT
Jim Willingham   5/29/2018   Anticoagulation Therapy Visit    Description:  62 year old male   Provider:  Delisa Hillman, RN   Department:  [facility]           INR as of 5/29/2018     Today's INR       Anticoagulation Summary as of 5/29/2018     INR goal 2.0-3.0   Today's INR    Full warfarin instructions 5/29: 7.5 mg; 5/30: 7.5 mg; 5/31: 7.5 mg; 6/1: 5 mg; 6/2: 7.5 mg; 6/3: 7.5 mg; 6/4: 5 mg   Next INR check 6/5/2018    Indications   LVAD (left ventricular assist device) present (H) [Z95.811]  Long-term (current) use of anticoagulants [Z79.01] [Z79.01]         May 2018 Details    Sun Mon Tue Wed Thu Fri Sat       1               2               3               4               5                 6               7               8               9               10               11               12                 13               14               15               16               17               18               19                 20               21               22               23               24               25               26                 27               28               29      7.5 mg   See details      30      7.5 mg         31      7.5 mg            Date Details   05/29 This INR check               How to take your warfarin dose     To take:  7.5 mg Take 1.5 of the 5 mg tablets.           June 2018 Details    Sun Mon Tue Wed Thu Fri Sat          1      5 mg         2      7.5 mg           3      7.5 mg         4      5 mg         5            6               7               8               9                 10               11               12               13               14               15               16                 17               18               19               20               21               22               23                 24               25               26               27               28               29               30                Date Details   No  additional details    Date of next INR:  6/5/2018         How to take your warfarin dose     To take:  5 mg Take 1 of the 5 mg tablets.    To take:  7.5 mg Take 1.5 of the 5 mg tablets.

## 2018-05-29 NOTE — PROGRESS NOTES
ANTICOAGULATION FOLLOW-UP CLINIC VISIT    Patient Name:  Jim Willingham  Date:  5/29/2018  Contact Type:  Telephone    SUBJECTIVE:        OBJECTIVE    INR   Date Value Ref Range Status   05/29/2018 2.42 (H) 0.86 - 1.14 Final       ASSESSMENT / PLAN  INR assessment THER    Recheck INR In: 1 WEEK    INR Location Clinic      Anticoagulation Summary as of 5/29/2018     INR goal 2.0-3.0   Today's INR    Warfarin maintenance plan No maintenance plan   Full warfarin instructions 5/29: 7.5 mg; 5/30: 7.5 mg; 5/31: 7.5 mg; 6/1: 5 mg; 6/2: 7.5 mg; 6/3: 7.5 mg; 6/4: 5 mg   Weekly warfarin total 62.5 mg   Plan last modified aMru Alonso RN (3/28/2018)   Next INR check 6/5/2018   Priority INR   Target end date     Indications   LVAD (left ventricular assist device) present (H) [Z95.811]  Long-term (current) use of anticoagulants [Z79.01] [Z79.01]         Anticoagulation Episode Summary     INR check location     Preferred lab     Send INR reminders to [facility]    Comments HIPPA Forms mailed 6/26/17  Labs drawn either at [facility] or [facility]      Anticoagulation Care Providers     Provider Role Specialty Phone number    Delisa Montgomery MD Responsible Cardiology [phone]            See the Encounter Report to view Anticoagulation Flowsheet and Dosing Calendar (Go to Encounters tab in chart review, and find the Anticoagulation Therapy Visit)    Spoke with patient.    Delisa Hillman RN

## 2018-06-04 ENCOUNTER — ANTICOAGULATION THERAPY VISIT (OUTPATIENT)
Dept: ANTICOAGULATION | Facility: CLINIC | Age: 63
End: 2018-06-04

## 2018-06-04 DIAGNOSIS — Z79.01 LONG-TERM (CURRENT) USE OF ANTICOAGULANTS: ICD-10-CM

## 2018-06-04 DIAGNOSIS — Z95.811 LVAD (LEFT VENTRICULAR ASSIST DEVICE) PRESENT (H): ICD-10-CM

## 2018-06-04 LAB — INR PPP: 2.36 (ref 0.86–1.14)

## 2018-06-04 PROCEDURE — 85610 PROTHROMBIN TIME: CPT | Performed by: INTERNAL MEDICINE

## 2018-06-04 PROCEDURE — 36415 COLL VENOUS BLD VENIPUNCTURE: CPT | Performed by: INTERNAL MEDICINE

## 2018-06-04 NOTE — PROGRESS NOTES
ANTICOAGULATION FOLLOW-UP CLINIC VISIT    Patient Name:  Jim Willingham  Date:  6/4/2018  Contact Type:  Telephone    SUBJECTIVE:     Patient Findings     Positives No Problem Findings           OBJECTIVE    INR   Date Value Ref Range Status   06/04/2018 2.36 (H) 0.86 - 1.14 Final       ASSESSMENT / PLAN  No question data found.  Anticoagulation Summary as of 6/4/2018     INR goal 2.0-3.0   Today's INR 2.36   Warfarin maintenance plan 5 mg (5 mg x 1) on Fri; 7.5 mg (5 mg x 1.5) all other days   Full warfarin instructions 5 mg on Fri; 7.5 mg all other days   Weekly warfarin total 50 mg   Plan last modified Maru Alonso, RN (6/4/2018)   Next INR check 6/11/2018   Priority INR   Target end date     Indications   LVAD (left ventricular assist device) present (H) [Z95.811]  Long-term (current) use of anticoagulants [Z79.01] [Z79.01]         Anticoagulation Episode Summary     INR check location     Preferred lab     Send INR reminders to [facility]    Comments HIPPA Forms mailed 6/26/17  Labs drawn either at [facility] or [facility]      Anticoagulation Care Providers     Provider Role Specialty Phone number    Delisa Montgomery MD Responsible Cardiology [phone]            See the Encounter Report to view Anticoagulation Flowsheet and Dosing Calendar (Go to Encounters tab in chart review, and find the Anticoagulation Therapy Visit)    Spoke with Tatyana Alonso, REGINALD

## 2018-06-04 NOTE — MR AVS SNAPSHOT
Jim Willingham   6/4/2018   Anticoagulation Therapy Visit    Description:  62 year old male   Provider:  Maru Alonso, RN   Department:  [facility]           INR as of 6/4/2018     Today's INR 2.36      Anticoagulation Summary as of 6/4/2018     INR goal 2.0-3.0   Today's INR 2.36   Full warfarin instructions 5 mg on Fri; 7.5 mg all other days   Next INR check 6/11/2018    Indications   LVAD (left ventricular assist device) present (H) [Z95.811]  Long-term (current) use of anticoagulants [Z79.01] [Z79.01]         June 2018 Details    Sun Mon Tue Wed Thu Fri Sat          1               2                 3               4      7.5 mg   See details      5      7.5 mg         6      7.5 mg         7      7.5 mg         8      5 mg         9      7.5 mg           10      7.5 mg         11            12               13               14               15               16                 17               18               19               20               21               22               23                 24               25               26               27               28               29               30                Date Details   06/04 This INR check       Date of next INR:  6/11/2018         How to take your warfarin dose     To take:  5 mg Take 1 of the 5 mg tablets.    To take:  7.5 mg Take 1.5 of the 5 mg tablets.

## 2018-06-06 DIAGNOSIS — K21.9 GASTROESOPHAGEAL REFLUX DISEASE WITHOUT ESOPHAGITIS: ICD-10-CM

## 2018-06-08 RX ORDER — PANTOPRAZOLE SODIUM 40 MG/1
40 TABLET, DELAYED RELEASE ORAL EVERY MORNING
Qty: 60 TABLET | Refills: 5 | Status: SHIPPED | OUTPATIENT
Start: 2018-06-08 | End: 2019-06-27

## 2018-06-11 ENCOUNTER — ANTICOAGULATION THERAPY VISIT (OUTPATIENT)
Dept: ANTICOAGULATION | Facility: CLINIC | Age: 63
End: 2018-06-11

## 2018-06-11 DIAGNOSIS — Z79.01 LONG-TERM (CURRENT) USE OF ANTICOAGULANTS: ICD-10-CM

## 2018-06-11 DIAGNOSIS — Z95.811 LVAD (LEFT VENTRICULAR ASSIST DEVICE) PRESENT (H): ICD-10-CM

## 2018-06-11 LAB — INR PPP: 2.53 (ref 0.86–1.14)

## 2018-06-11 PROCEDURE — 36415 COLL VENOUS BLD VENIPUNCTURE: CPT | Performed by: INTERNAL MEDICINE

## 2018-06-11 PROCEDURE — 85610 PROTHROMBIN TIME: CPT | Performed by: INTERNAL MEDICINE

## 2018-06-11 NOTE — MR AVS SNAPSHOT
Jim Willingham   6/11/2018   Anticoagulation Therapy Visit    Description:  63 year old male   Provider:  Kirsty Bermudez, RN   Department:  [facility]           INR as of 6/11/2018     Today's INR 2.53      Anticoagulation Summary as of 6/11/2018     INR goal 2.0-3.0   Today's INR 2.53   Full warfarin instructions 5 mg on Fri; 7.5 mg all other days   Next INR check 6/18/2018    Indications   LVAD (left ventricular assist device) present (H) [Z95.811]  Long-term (current) use of anticoagulants [Z79.01] [Z79.01]         June 2018 Details    Sun Mon Tue Wed Thu Fri Sat          1               2                 3               4               5               6               7               8               9                 10               11      7.5 mg   See details      12      7.5 mg         13      7.5 mg         14      7.5 mg         15      5 mg         16      7.5 mg           17      7.5 mg         18            19               20               21               22               23                 24               25               26               27               28               29               30                Date Details   06/11 This INR check       Date of next INR:  6/18/2018         How to take your warfarin dose     To take:  5 mg Take 1 of the 5 mg tablets.    To take:  7.5 mg Take 1.5 of the 5 mg tablets.

## 2018-06-11 NOTE — PROGRESS NOTES
ANTICOAGULATION FOLLOW-UP CLINIC VISIT    Patient Name:  Jim Willingham  Date:  6/11/2018  Contact Type:  Telephone    SUBJECTIVE:     Patient Findings     Positives No Problem Findings           OBJECTIVE    INR   Date Value Ref Range Status   06/11/2018 2.53 (H) 0.86 - 1.14 Final       ASSESSMENT / PLAN  INR assessment THER    Recheck INR In: 1 WEEK    INR Location Clinic      Anticoagulation Summary as of 6/11/2018     INR goal 2.0-3.0   Today's INR 2.53   Warfarin maintenance plan 5 mg (5 mg x 1) on Fri; 7.5 mg (5 mg x 1.5) all other days   Full warfarin instructions 5 mg on Fri; 7.5 mg all other days   Weekly warfarin total 50 mg   No change documented Kirsty Bermudez RN   Plan last modified Maru Alonso RN (6/4/2018)   Next INR check 6/18/2018   Priority INR   Target end date     Indications   LVAD (left ventricular assist device) present (H) [Z95.811]  Long-term (current) use of anticoagulants [Z79.01] [Z79.01]         Anticoagulation Episode Summary     INR check location     Preferred lab     Send INR reminders to [facility]    Comments HIPPA Forms mailed 6/26/17  Labs drawn either at [facility] or [facility]      Anticoagulation Care Providers     Provider Role Specialty Phone number    Delisa Montgomery MD Responsible Cardiology [phone]            See the Encounter Report to view Anticoagulation Flowsheet and Dosing Calendar (Go to Encounters tab in chart review, and find the Anticoagulation Therapy Visit)    Spoke with Jim.  He reports no changes in health, diet, medications.    Kirsty Bermudez, REGINALD

## 2018-06-18 ENCOUNTER — ANTICOAGULATION THERAPY VISIT (OUTPATIENT)
Dept: ANTICOAGULATION | Facility: CLINIC | Age: 63
End: 2018-06-18

## 2018-06-18 DIAGNOSIS — Z79.01 LONG-TERM (CURRENT) USE OF ANTICOAGULANTS: ICD-10-CM

## 2018-06-18 DIAGNOSIS — Z95.811 LVAD (LEFT VENTRICULAR ASSIST DEVICE) PRESENT (H): ICD-10-CM

## 2018-06-18 LAB — INR PPP: 2.43 (ref 0.86–1.14)

## 2018-06-18 PROCEDURE — 85610 PROTHROMBIN TIME: CPT | Performed by: INTERNAL MEDICINE

## 2018-06-18 PROCEDURE — 36415 COLL VENOUS BLD VENIPUNCTURE: CPT | Performed by: INTERNAL MEDICINE

## 2018-06-18 NOTE — PROGRESS NOTES
ANTICOAGULATION FOLLOW-UP CLINIC VISIT    Patient Name:  Jim Willingham  Date:  6/18/2018  Contact Type:  Telephone    SUBJECTIVE:     Patient Findings     Positives No Problem Findings           OBJECTIVE    INR   Date Value Ref Range Status   06/18/2018 2.43 (H) 0.86 - 1.14 Final       ASSESSMENT / PLAN  INR assessment THER    Recheck INR In: 1 WEEK    INR Location Clinic      Anticoagulation Summary as of 6/18/2018     INR goal 2.0-3.0   Today's INR 2.43   Warfarin maintenance plan 5 mg (5 mg x 1) on Fri; 7.5 mg (5 mg x 1.5) all other days   Full warfarin instructions 5 mg on Fri; 7.5 mg all other days   Weekly warfarin total 50 mg   No change documented Kirsty Bermudez RN   Plan last modified Maru Alonso RN (6/4/2018)   Next INR check 6/25/2018   Priority INR   Target end date     Indications   LVAD (left ventricular assist device) present (H) [Z95.811]  Long-term (current) use of anticoagulants [Z79.01] [Z79.01]         Anticoagulation Episode Summary     INR check location     Preferred lab     Send INR reminders to [facility]    Comments HIPPA Forms mailed 6/26/17  Labs drawn either at [facility] or [facility]      Anticoagulation Care Providers     Provider Role Specialty Phone number    Delisa Montgomery MD Responsible Cardiology [phone]            See the Encounter Report to view Anticoagulation Flowsheet and Dosing Calendar (Go to Encounters tab in chart review, and find the Anticoagulation Therapy Visit)    Spoke with Jim.  He reports no changes in health, diet, medications.      Kirsty Bermudez, REGINALD

## 2018-06-18 NOTE — MR AVS SNAPSHOT
Jim Willingham   6/18/2018   Anticoagulation Therapy Visit    Description:  63 year old male   Provider:  Kirsty Bermudez, RN   Department:  [facility]           INR as of 6/18/2018     Today's INR 2.43      Anticoagulation Summary as of 6/18/2018     INR goal 2.0-3.0   Today's INR 2.43   Full warfarin instructions 5 mg on Fri; 7.5 mg all other days   Next INR check 6/25/2018    Indications   LVAD (left ventricular assist device) present (H) [Z95.811]  Long-term (current) use of anticoagulants [Z79.01] [Z79.01]         June 2018 Details    Sun Mon Tue Wed Thu Fri Sat          1               2                 3               4               5               6               7               8               9                 10               11               12               13               14               15               16                 17               18      7.5 mg   See details      19      7.5 mg         20      7.5 mg         21      7.5 mg         22      5 mg         23      7.5 mg           24      7.5 mg         25            26               27               28               29               30                Date Details   06/18 This INR check       Date of next INR:  6/25/2018         How to take your warfarin dose     To take:  5 mg Take 1 of the 5 mg tablets.    To take:  7.5 mg Take 1.5 of the 5 mg tablets.

## 2018-06-25 ENCOUNTER — ANTICOAGULATION THERAPY VISIT (OUTPATIENT)
Dept: ANTICOAGULATION | Facility: CLINIC | Age: 63
End: 2018-06-25

## 2018-06-25 DIAGNOSIS — Z95.811 LVAD (LEFT VENTRICULAR ASSIST DEVICE) PRESENT (H): ICD-10-CM

## 2018-06-25 DIAGNOSIS — Z79.01 LONG-TERM (CURRENT) USE OF ANTICOAGULANTS: ICD-10-CM

## 2018-06-25 DIAGNOSIS — I50.22 CHRONIC SYSTOLIC CONGESTIVE HEART FAILURE (H): ICD-10-CM

## 2018-06-25 LAB — INR PPP: 2.78 (ref 0.86–1.14)

## 2018-06-25 PROCEDURE — 36415 COLL VENOUS BLD VENIPUNCTURE: CPT | Performed by: INTERNAL MEDICINE

## 2018-06-25 PROCEDURE — 85610 PROTHROMBIN TIME: CPT | Performed by: INTERNAL MEDICINE

## 2018-06-25 NOTE — MR AVS SNAPSHOT
Jim Willingham   6/25/2018   Anticoagulation Therapy Visit    Description:  63 year old male   Provider:  Kirsty Bermudez, RN   Department:  [facility]           INR as of 6/25/2018     Today's INR 2.78      Anticoagulation Summary as of 6/25/2018     INR goal 2.0-3.0   Today's INR 2.78   Full warfarin instructions 5 mg on Fri; 7.5 mg all other days   Next INR check 7/6/2018    Indications   LVAD (left ventricular assist device) present (H) [Z95.811]  Long-term (current) use of anticoagulants [Z79.01] [Z79.01]         June 2018 Details    Sun Mon Tue Wed Thu Fri Sat          1               2                 3               4               5               6               7               8               9                 10               11               12               13               14               15               16                 17               18               19               20               21               22               23                 24               25      7.5 mg   See details      26      7.5 mg         27      7.5 mg         28      7.5 mg         29      5 mg         30      7.5 mg          Date Details   06/25 This INR check               How to take your warfarin dose     To take:  5 mg Take 1 of the 5 mg tablets.    To take:  7.5 mg Take 1.5 of the 5 mg tablets.           July 2018 Details    Sun Mon Tue Wed Thu Fri Sat     1      7.5 mg         2      7.5 mg         3      7.5 mg         4      7.5 mg         5      7.5 mg         6            7                 8               9               10               11               12               13               14                 15               16               17               18               19               20               21                 22               23               24               25               26               27               28                 29               30               31                     Date Details   No additional details    Date of next INR:  7/6/2018         How to take your warfarin dose     To take:  5 mg Take 1 of the 5 mg tablets.    To take:  7.5 mg Take 1.5 of the 5 mg tablets.

## 2018-06-25 NOTE — PROGRESS NOTES
ANTICOAGULATION FOLLOW-UP CLINIC VISIT    Patient Name:  Jim Willingham  Date:  6/25/2018  Contact Type:  Telephone    SUBJECTIVE:        OBJECTIVE    INR   Date Value Ref Range Status   06/25/2018 2.78 (H) 0.86 - 1.14 Final       ASSESSMENT / PLAN  INR assessment THER    Recheck INR In: 10 DAYS    INR Location Clinic      Anticoagulation Summary as of 6/25/2018     INR goal 2.0-3.0   Today's INR 2.78   Warfarin maintenance plan 5 mg (5 mg x 1) on Fri; 7.5 mg (5 mg x 1.5) all other days   Full warfarin instructions 5 mg on Fri; 7.5 mg all other days   Weekly warfarin total 50 mg   No change documented Kirsty Bermudez RN   Plan last modified Maru Alonso RN (6/4/2018)   Next INR check 7/6/2018   Priority INR   Target end date     Indications   LVAD (left ventricular assist device) present (H) [Z95.811]  Long-term (current) use of anticoagulants [Z79.01] [Z79.01]         Anticoagulation Episode Summary     INR check location     Preferred lab     Send INR reminders to [facility]    Comments HIPPA Forms mailed 6/26/17  Labs drawn either at [facility] or [facility]      Anticoagulation Care Providers     Provider Role Specialty Phone number    Delisa Montgomery MD Responsible Cardiology [phone]            See the Encounter Report to view Anticoagulation Flowsheet and Dosing Calendar (Go to Encounters tab in chart review, and find the Anticoagulation Therapy Visit)    Left message for patient with results and dosing recommendations. Asked patient to call back to report any missed doses, falls, signs and symptoms of bleeding or clotting, any changes in health, medication, or diet. Asked patient to call back with any questions or concerns.  Jim has an appt at [facility] on 7/6/18--will recommend he check his INR then.    Kirsty Bermudez, RN

## 2018-06-27 RX ORDER — METOPROLOL SUCCINATE 25 MG/1
37.5 TABLET, EXTENDED RELEASE ORAL 2 TIMES DAILY
Qty: 180 TABLET | Refills: 5 | Status: SHIPPED | OUTPATIENT
Start: 2018-06-27 | End: 2018-07-06

## 2018-07-03 DIAGNOSIS — I50.22 CHRONIC SYSTOLIC CONGESTIVE HEART FAILURE (H): Primary | ICD-10-CM

## 2018-07-03 DIAGNOSIS — Z95.811 LVAD (LEFT VENTRICULAR ASSIST DEVICE) PRESENT (H): ICD-10-CM

## 2018-07-05 DIAGNOSIS — I50.22 CHRONIC SYSTOLIC CONGESTIVE HEART FAILURE (H): ICD-10-CM

## 2018-07-05 DIAGNOSIS — Z95.811 LVAD (LEFT VENTRICULAR ASSIST DEVICE) PRESENT (H): Primary | ICD-10-CM

## 2018-07-06 ENCOUNTER — ANTICOAGULATION THERAPY VISIT (OUTPATIENT)
Dept: ANTICOAGULATION | Facility: CLINIC | Age: 63
End: 2018-07-06

## 2018-07-06 ENCOUNTER — APPOINTMENT (OUTPATIENT)
Dept: CARDIOLOGY | Facility: CLINIC | Age: 63
End: 2018-07-06
Attending: INTERNAL MEDICINE
Payer: COMMERCIAL

## 2018-07-06 ENCOUNTER — HOSPITAL ENCOUNTER (OUTPATIENT)
Facility: CLINIC | Age: 63
Discharge: HOME | End: 2018-07-06
Attending: INTERNAL MEDICINE | Admitting: INTERNAL MEDICINE
Payer: COMMERCIAL

## 2018-07-06 ENCOUNTER — APPOINTMENT (OUTPATIENT)
Dept: MEDSURG UNIT | Facility: CLINIC | Age: 63
End: 2018-07-06
Attending: INTERNAL MEDICINE
Payer: COMMERCIAL

## 2018-07-06 VITALS
OXYGEN SATURATION: 98 % | HEART RATE: 72 BPM | WEIGHT: 273.7 LBS | BODY MASS INDEX: 41.48 KG/M2 | SYSTOLIC BLOOD PRESSURE: 82 MMHG | HEIGHT: 68 IN

## 2018-07-06 VITALS
HEIGHT: 68 IN | SYSTOLIC BLOOD PRESSURE: 108 MMHG | HEART RATE: 77 BPM | OXYGEN SATURATION: 97 % | TEMPERATURE: 97.5 F | WEIGHT: 260 LBS | BODY MASS INDEX: 39.4 KG/M2 | RESPIRATION RATE: 18 BRPM | DIASTOLIC BLOOD PRESSURE: 94 MMHG

## 2018-07-06 DIAGNOSIS — I50.22 CHRONIC SYSTOLIC CONGESTIVE HEART FAILURE (H): ICD-10-CM

## 2018-07-06 DIAGNOSIS — Z95.811 LVAD (LEFT VENTRICULAR ASSIST DEVICE) PRESENT (H): ICD-10-CM

## 2018-07-06 DIAGNOSIS — I50.22 CHRONIC SYSTOLIC CONGESTIVE HEART FAILURE (H): Primary | ICD-10-CM

## 2018-07-06 DIAGNOSIS — Z79.01 LONG-TERM (CURRENT) USE OF ANTICOAGULANTS: ICD-10-CM

## 2018-07-06 DIAGNOSIS — I42.8 NICM (NONISCHEMIC CARDIOMYOPATHY) (H): Primary | ICD-10-CM

## 2018-07-06 DIAGNOSIS — Z95.811 LVAD (LEFT VENTRICULAR ASSIST DEVICE) PRESENT (H): Primary | ICD-10-CM

## 2018-07-06 LAB
ABO + RH BLD: NORMAL
ABO + RH BLD: NORMAL
ALBUMIN SERPL-MCNC: 4 G/DL (ref 3.4–5)
ALP SERPL-CCNC: 112 U/L (ref 40–150)
ALT SERPL W P-5'-P-CCNC: 63 U/L (ref 0–70)
ANION GAP SERPL CALCULATED.3IONS-SCNC: 9 MMOL/L (ref 3–14)
AST SERPL W P-5'-P-CCNC: 28 U/L (ref 0–45)
BILIRUB SERPL-MCNC: 0.6 MG/DL (ref 0.2–1.3)
BLD GP AB SCN SERPL QL: NORMAL
BLOOD BANK CMNT PATIENT-IMP: NORMAL
BUN SERPL-MCNC: 50 MG/DL (ref 7–30)
CALCIUM SERPL-MCNC: 8.7 MG/DL (ref 8.5–10.1)
CHLORIDE SERPL-SCNC: 99 MMOL/L (ref 94–109)
CHOLEST SERPL-MCNC: 216 MG/DL
CO2 SERPL-SCNC: 24 MMOL/L (ref 20–32)
CREAT SERPL-MCNC: 1.58 MG/DL (ref 0.66–1.25)
CRP SERPL-MCNC: 26 MG/L (ref 0–8)
ERYTHROCYTE [DISTWIDTH] IN BLOOD BY AUTOMATED COUNT: 16.3 % (ref 10–15)
FERRITIN SERPL-MCNC: 45 NG/ML (ref 26–388)
GFR SERPL CREATININE-BSD FRML MDRD: 45 ML/MIN/1.7M2
GLUCOSE SERPL-MCNC: 224 MG/DL (ref 70–99)
HCT VFR BLD AUTO: 38 % (ref 40–53)
HDLC SERPL-MCNC: 63 MG/DL
HGB BLD-MCNC: 11.7 G/DL (ref 13.3–17.7)
HGB FREE PLAS-MCNC: <30 MG/DL
INR PPP: 2.56 (ref 0.86–1.14)
IRON SATN MFR SERPL: 8 % (ref 15–46)
IRON SERPL-MCNC: 34 UG/DL (ref 35–180)
LDH SERPL L TO P-CCNC: 352 U/L (ref 85–227)
LDLC SERPL CALC-MCNC: 128 MG/DL
MCH RBC QN AUTO: 25.5 PG (ref 26.5–33)
MCHC RBC AUTO-ENTMCNC: 30.8 G/DL (ref 31.5–36.5)
MCV RBC AUTO: 83 FL (ref 78–100)
NONHDLC SERPL-MCNC: 153 MG/DL
NT-PROBNP SERPL-MCNC: 1351 PG/ML (ref 0–125)
PLATELET # BLD AUTO: 226 10E9/L (ref 150–450)
POTASSIUM SERPL-SCNC: 4.7 MMOL/L (ref 3.4–5.3)
PROT SERPL-MCNC: 7.9 G/DL (ref 6.8–8.8)
RBC # BLD AUTO: 4.59 10E12/L (ref 4.4–5.9)
SODIUM SERPL-SCNC: 132 MMOL/L (ref 133–144)
SPECIMEN EXP DATE BLD: NORMAL
TIBC SERPL-MCNC: 430 UG/DL (ref 240–430)
TRANSFERRIN SERPL-MCNC: 349 MG/DL (ref 210–360)
TRIGL SERPL-MCNC: 123 MG/DL
TSH SERPL DL<=0.005 MIU/L-ACNC: 0.59 MU/L (ref 0.4–4)
URATE SERPL-MCNC: 7.4 MG/DL (ref 3.5–7.2)
VIT B12 SERPL-MCNC: 535 PG/ML (ref 193–986)
WBC # BLD AUTO: 10.4 10E9/L (ref 4–11)

## 2018-07-06 PROCEDURE — 84443 ASSAY THYROID STIM HORMONE: CPT | Performed by: INTERNAL MEDICINE

## 2018-07-06 PROCEDURE — 83540 ASSAY OF IRON: CPT | Performed by: INTERNAL MEDICINE

## 2018-07-06 PROCEDURE — 99214 OFFICE O/P EST MOD 30 MIN: CPT | Mod: 25 | Performed by: INTERNAL MEDICINE

## 2018-07-06 PROCEDURE — 93451 RIGHT HEART CATH: CPT

## 2018-07-06 PROCEDURE — 82607 VITAMIN B-12: CPT | Performed by: INTERNAL MEDICINE

## 2018-07-06 PROCEDURE — 25000125 ZZHC RX 250: Performed by: INTERNAL MEDICINE

## 2018-07-06 PROCEDURE — 83051 HEMOGLOBIN PLASMA: CPT | Performed by: INTERNAL MEDICINE

## 2018-07-06 PROCEDURE — 86901 BLOOD TYPING SEROLOGIC RH(D): CPT | Performed by: INTERNAL MEDICINE

## 2018-07-06 PROCEDURE — 85027 COMPLETE CBC AUTOMATED: CPT | Performed by: INTERNAL MEDICINE

## 2018-07-06 PROCEDURE — 84466 ASSAY OF TRANSFERRIN: CPT | Performed by: INTERNAL MEDICINE

## 2018-07-06 PROCEDURE — 36415 COLL VENOUS BLD VENIPUNCTURE: CPT | Performed by: INTERNAL MEDICINE

## 2018-07-06 PROCEDURE — 84550 ASSAY OF BLOOD/URIC ACID: CPT | Performed by: INTERNAL MEDICINE

## 2018-07-06 PROCEDURE — 80061 LIPID PANEL: CPT | Performed by: INTERNAL MEDICINE

## 2018-07-06 PROCEDURE — 86850 RBC ANTIBODY SCREEN: CPT | Performed by: INTERNAL MEDICINE

## 2018-07-06 PROCEDURE — 27210982 ZZH KIT RT HC TOTES DISP CR7

## 2018-07-06 PROCEDURE — G0463 HOSPITAL OUTPT CLINIC VISIT: HCPCS | Mod: 25,ZF

## 2018-07-06 PROCEDURE — 93284 PRGRMG EVAL IMPLANTABLE DFB: CPT | Mod: ZF

## 2018-07-06 PROCEDURE — 86900 BLOOD TYPING SEROLOGIC ABO: CPT | Performed by: INTERNAL MEDICINE

## 2018-07-06 PROCEDURE — 86140 C-REACTIVE PROTEIN: CPT | Performed by: INTERNAL MEDICINE

## 2018-07-06 PROCEDURE — 93750 INTERROGATION VAD IN PERSON: CPT | Mod: ZF | Performed by: INTERNAL MEDICINE

## 2018-07-06 PROCEDURE — 83550 IRON BINDING TEST: CPT | Performed by: INTERNAL MEDICINE

## 2018-07-06 PROCEDURE — 27210787 ZZH MANIFOLD CR2

## 2018-07-06 PROCEDURE — 93306 TTE W/DOPPLER COMPLETE: CPT

## 2018-07-06 PROCEDURE — 40000166 ZZH STATISTIC PP CARE STAGE 1

## 2018-07-06 PROCEDURE — 83880 ASSAY OF NATRIURETIC PEPTIDE: CPT | Performed by: INTERNAL MEDICINE

## 2018-07-06 PROCEDURE — 27211181 ZZH BALLOON TIP PRESSURE CR5

## 2018-07-06 PROCEDURE — 85610 PROTHROMBIN TIME: CPT | Performed by: INTERNAL MEDICINE

## 2018-07-06 PROCEDURE — 93284 PRGRMG EVAL IMPLANTABLE DFB: CPT | Mod: 26 | Performed by: INTERNAL MEDICINE

## 2018-07-06 PROCEDURE — 93451 RIGHT HEART CATH: CPT | Mod: 26 | Performed by: INTERNAL MEDICINE

## 2018-07-06 PROCEDURE — 82728 ASSAY OF FERRITIN: CPT | Performed by: INTERNAL MEDICINE

## 2018-07-06 PROCEDURE — 83615 LACTATE (LD) (LDH) ENZYME: CPT | Performed by: INTERNAL MEDICINE

## 2018-07-06 PROCEDURE — 80053 COMPREHEN METABOLIC PANEL: CPT | Performed by: INTERNAL MEDICINE

## 2018-07-06 PROCEDURE — 93306 TTE W/DOPPLER COMPLETE: CPT | Mod: 26 | Performed by: INTERNAL MEDICINE

## 2018-07-06 RX ORDER — METOPROLOL SUCCINATE 25 MG/1
25 TABLET, EXTENDED RELEASE ORAL 2 TIMES DAILY
Qty: 180 TABLET | Refills: 5 | Status: SHIPPED | OUTPATIENT
Start: 2018-07-06 | End: 2018-11-20

## 2018-07-06 RX ORDER — LIDOCAINE 40 MG/G
CREAM TOPICAL
Status: COMPLETED | OUTPATIENT
Start: 2018-07-06 | End: 2018-07-06

## 2018-07-06 RX ORDER — BUMETANIDE 1 MG/1
1 TABLET ORAL 2 TIMES DAILY
Qty: 180 TABLET | Refills: 3 | Status: SHIPPED | OUTPATIENT
Start: 2018-07-06 | End: 2018-07-24

## 2018-07-06 RX ORDER — OXYCODONE AND ACETAMINOPHEN 5; 325 MG/1; MG/1
TABLET ORAL
COMMUNITY
Start: 2018-06-20 | End: 2018-07-06

## 2018-07-06 RX ADMIN — LIDOCAINE: 40 CREAM TOPICAL at 09:50

## 2018-07-06 ASSESSMENT — ENCOUNTER SYMPTOMS
EYE IRRITATION: 0
ARTHRALGIAS: 1
LIGHT-HEADEDNESS: 1
HYPOTENSION: 0
COUGH: 0
SPUTUM PRODUCTION: 0
HYPERTENSION: 0
DYSPNEA ON EXERTION: 1
SHORTNESS OF BREATH: 1
EXERCISE INTOLERANCE: 1
COUGH DISTURBING SLEEP: 0
MUSCLE CRAMPS: 0
EYE PAIN: 0
WHEEZING: 1
EYE REDNESS: 0
JOINT SWELLING: 0
STIFFNESS: 1
LEG PAIN: 0
SNORES LOUDLY: 0
POSTURAL DYSPNEA: 0
ORTHOPNEA: 0
EYE WATERING: 0
PALPITATIONS: 0
NECK PAIN: 0
HEMOPTYSIS: 0
SLEEP DISTURBANCES DUE TO BREATHING: 0
BACK PAIN: 0
MYALGIAS: 1
SYNCOPE: 0
DOUBLE VISION: 0

## 2018-07-06 ASSESSMENT — PAIN SCALES - GENERAL: PAINLEVEL: NO PAIN (0)

## 2018-07-06 NOTE — LETTER
7/6/2018      RE: Jim Willingham  [address] [facility] [id number]       Dear Colleague,    Thank you for the opportunity to participate in the care of your patient, Jim Willingham, at the [facility]. Please see a copy of my visit note below.    HPI:   Jim Willingham is a 63 year old male with a past medical history of NICM (EF 20%) s/p HM II LVAD placement (6/19/17) complicated by arrhythmias, WALTER, and small left pleural effusion, CKD Stage III, DM Type II, CECILIA, obesity, HTN, COPD, and asthma who is here for heart failure and LVAD follow up.     Today he reports feeling well overall, with some good days and bad days. His bad days are notable for some fatigue and dyspnea with exertion, although overall he reports feeling much better than he did prior to LVAD placement. His wife is also concerned that he has been more fatigued over the past few months than he was previously. He denies any chest pain, edema, palpitations, orthopnea, PND. He does have some occasional dizziness but no syncope. He denies fevers, chills, driveline redness, tenderness, or discharge. No LVAD alarms and denies any stroke symptoms or blood in the urine or stools.        PAST MEDICAL HISTORY:  Past Medical History:   Diagnosis Date     Benign essential hypertension 5/11/2017     Cardiomyopathy, unspecified 5/8/2017     CKD (chronic kidney disease) stage 3, GFR 30-59 ml/min 5/11/2017     Depression 5/11/2017     Diabetes mellitus (H) 5/11/2017     H/O gastric bypass 5/11/2017     ICD (implantable cardioverter-defibrillator), biventricular, in situ 5/11/2017     NICM (nonischemic cardiomyopathy) (H)/ EF 20% 5/11/2017    ECHO: LVEDd. 7.66 cm, Restrictive pattern , Severe mitral valve regurgitation     CECILIA (obstructive sleep apnea) 5/11/2017     Paroxysmal atrial fibrillation (H) 5/11/2017     Paroxysmal VT (H) 5/11/2017     Uncomplicated asthma      Vitamin B12 deficiency (non  anemic) 5/11/2017       FAMILY HISTORY:  Family History   Problem Relation Age of Onset     Cerebrovascular Disease Mother      Diabetes Mother      Hypertension Mother      Coronary Artery Disease Father      Type 2 Diabetes Father      SOCIAL HISTORY:  Social History     Social History     Marital status:      Spouse name: N/A     Number of children: N/A     Years of education: N/A     Social History Main Topics     Smoking status: Former Smoker     Quit date: 1995     Smokeless tobacco: Not on file     Alcohol use No     Drug use: Not on file     Sexual activity: Yes     Partners: Female     Other Topics Concern     Parent/Sibling W/ Cabg, Mi Or Angioplasty Before 65f 55m? No     Social History Narrative       CURRENT MEDICATIONS:    Current Outpatient Prescriptions on File Prior to Visit:  acetaminophen (TYLENOL) 325 MG tablet Take 2 tablets (650 mg) by mouth every 6 hours   albuterol (ALBUTEROL) 108 (90 BASE) MCG/ACT Inhaler Inhale 2 puffs into the lungs every 4 hours as needed for shortness of breath / dyspnea or wheezing   allopurinol (ZYLOPRIM) 100 MG tablet Take 1 tablet (100 mg) by mouth daily   bumetanide (BUMEX) 1 MG tablet Take 1 tablet (1 mg) by mouth 2 times daily   citalopram (CELEXA) 20 MG tablet Take 20 mg by mouth daily   cyanocobalamin (VITAMIN B12) 1000 MCG/ML injection Inject 1,000 mcg into the muscle every 30 days   fluticasone-vilanterol (BREO ELLIPTA) 200-25 MCG/INH oral inhaler Inhale 1 puff into the lungs daily   gabapentin (NEURONTIN) 300 MG capsule Take 1 capsule (300 mg) by mouth 3 times daily   ipratropium - albuterol 0.5 mg/2.5 mg/3 mL (DUONEB) 0.5-2.5 (3) MG/3ML neb solution Take 3 mLs by nebulization every 4 hours as needed for shortness of breath / dyspnea or wheezing   losartan (COZAAR) 25 MG tablet Take 1 tablet (25 mg) by mouth daily   metFORMIN (GLUCOPHAGE) 500 MG tablet Take 0.5 tablets (250 mg) by mouth 2 times daily (with meals)   metoprolol succinate (TOPROL-XL) 25  "MG 24 hr tablet Take 1.5 tablets (37.5 mg) by mouth 2 times daily   montelukast (SINGULAIR) 10 MG tablet Take 10 mg by mouth At Bedtime   pantoprazole (PROTONIX) 40 MG EC tablet Take 1 tablet (40 mg) by mouth every morning   potassium chloride (KLOR-CON) 20 MEQ Packet Take 20 mEq by mouth daily   predniSONE (DELTASONE) 20 MG tablet Take 0.5 tablets (10 mg) by mouth daily   spironolactone (ALDACTONE) 25 MG tablet Take 1 tablet (25 mg) by mouth daily   traMADol (ULTRAM) 50 MG tablet Take 2 tablets (100 mg) by mouth every 6 hours as needed for moderate pain or breakthrough pain   umeclidinium (INCRUSE ELLIPTA) 62.5 MCG/INH oral inhaler Inhale 1 puff into the lungs daily   warfarin (COUMADIN) 3 MG tablet Use as directed.   warfarin (COUMADIN) 5 MG tablet Take 2-2.5 tablets daily or as directed by coumadin clinic.   zinc sulfate (ZINCATE) 220 (50 ZN) MG capsule Take 220 mg by mouth 2 times daily   aspirin 81 MG chewable tablet 1 tablet (81 mg) by Oral or Feeding Tube route daily (Patient not taking: Reported on 7/6/2018)   polyethylene glycol (MIRALAX/GLYCOLAX) Packet Take 17 g by mouth daily as needed for constipation (for no bm in 1 day) (Patient not taking: Reported on 7/6/2018)   senna-docusate (SENOKOT-S;PERICOLACE) 8.6-50 MG per tablet Take 2 tablets by mouth 2 times daily as needed for constipation (Patient not taking: Reported on 7/6/2018)     Current Facility-Administered Medications on File Prior to Visit:  [COMPLETED] lidocaine (LMX4) cream       EXAM:  BP (!) 82/0 (BP Location: Right arm, Cuff Size: Adult Large)  Pulse 72  Ht 1.727 m (5' 8\")  Wt 124.1 kg (273 lb 11.2 oz)  SpO2 98%  BMI 41.62 kg/m2  General: appears comfortable, alert and articulate  Head: normocephalic, atraumatic  Eyes: anicteric sclera, EOMI  Neck: no adenopathy, neck supple  Orophyarynx: moist mucosa, no lesions, dentition intact  Heart: LVAD hum present, no murmur, gallop, rub.  Elevated JVD.  Lungs: clear, no rales or " wheezing  Abdomen: soft, non-tender, bowel sounds present, no hepatomegaly.    Extremities: no clubbing, cyanosis, trace edema.    Neurological: normal speech and affect, no gross motor deficits      Labs:  Reviewed          Most recent echocardiogram:  Interpretation Summary  Technically difficult study.  HMII 9600 RPM  Severely (EF 10-20%) reduced left ventricular function is present. LVAD  cannula in the apex is not well seen. Velocities of the inflow and outflow not  interpretable.  Moderate right ventricular dilatation present. Global right ventricular  function is moderately reduced.  There is mitral and tricuspid regurgitation that is difficult to quantify on  this exam.  The inferior vena cava was normal in size with preserved respiratory  variability.  No pericardial effusion is present.  _____________________________________________________________________________  __        Left Ventricle  Severely (EF 10-20%) reduced left ventricular function is present. LVAD  cannula in the apex is not well seen. Velocities of the inflow and outflow not  interpretable.     Right Ventricle  Moderate right ventricular dilatation present. Global right ventricular  function is moderately reduced.     Aortic Valve  Aortic valve partially opens during each cardiac cycle.     Vessels  The inferior vena cava was normal in size with preserved respiratory  variability.        Pericardium  No pericardial effusion is present.     Compared to Previous Study  This study was compared with the study from 4.11.18, biventricular function  unchanged .  _____________________________________________________________________________  __     MMode/2D Measurements & Calculations  IVSd: 1.1 cm  LVIDd: 6.2 cm  LVIDs: 5.7 cm  LVPWd: 1.1 cm  FS: 8.8 %  LV mass(C)d: 291.2 grams  LV mass(C)dI: 127.4 grams/m2  RWT: 0.36        Doppler Measurements & Calculations  PA acc time: 0.10  sec        _____________________________________________________________________________  __        Report approved by: Shayne Page 07/06/2018 12:24 PM    Assessment and Plan:    Jim Willingham is a 63 year old male with a past medical history of NICM (EF 20%) s/p HM II LVAD. Overall he has been doing well but does appear volume overloaded today. His right heart cath today is also suggestive of some right ventricular dysfunction with normal wedge pressure of 11, and elevated right atrial pressure of 17, and mildly decreased cardiac output. We will increase his bumex to 2mg in the morning and 1mg in the evening. We also decreased his LVAD speed from 9600 to 9400 RPM. We also decreased his metoprolol to 25mg BID. We will repeat labs in 1 week to recheck his kidney function which was stable today at 1.58. We would like to consider transplant in the future however he needs to lose some more weight. Also his pulmonary function has been abnormal with his last PFTs showing FEV1 at 32% predicted. If he is able to lose some weight then we may repeat PFTs later this year. He is also iron deficient with mild anemia and we will start IV iron replacement.       1.  Chronic systolic heart failure secondary to NICM.    Stage D    NYHA Class II  ACEi/ARB: losartan 25mg  BB:  Decrease Toprol XL to 25 mg twice daily.  Aldosterone antagonist: Aldactone to 25 mg daily.   SCD prophylaxis: CRT-D  Fluid status:  hypervolemic, increase bumex to 2mg in AM and 1mg in PM     2.  LVAD S/P HM II LVAD 6/19/17:  MAP controlled on current regimen.  Anticoagulation: Warfarin INR goal 2-3. Therapeutic today  Antiplatelet: restart ASA dose 81 mg daily.  LDH:  3 52 today      3.  CKD Stage III:Overall stable, 1.5 today.     4.  HTN:  Controlled on Toprol XL and losartan       5.  Paroxysmal Atrial Fibrillation. Anticoagulated and rate controlled with Toprol XL.       6. Iron deficiency: will start IV iron replacement      This patient was seen and  examined with Dr. Montgomery.    Nba Pleitez MD  Advanced Heart Failure Fellow, PGY-7  Pager: [phone]      I have seen and examined the patient with the house staff on July 6, 2018 and agree with the outlined assessment and plan.    Delisa Montgomery MD   of Medicine   [facility] Division of Cardiology

## 2018-07-06 NOTE — PATIENT INSTRUCTIONS
Medication and Other Changes:  1. Please increase your Bumex to 2mg in the morning and 1mg in the afternoon. We may need to decrease this dose next week.   2. Please decrease your Toprol to 25mg, twice a day.   3. We will send order for IV iron infusions.   4. We decreased you speed today to 9400.   5. Please start your 81mg of aspirin again.     Follow-up:  1. We will send orders for labs in about a week.   2. Please schedule an appointment with an NP in one month.   3. Please schedule an appointment with Dr. Montgomery and the device clinic in [facility]      Page the VAD Coordinator on call if you gain more than 3 lb in a day or 5 in a week. Please also page if you feel unwell or have alarms.     Great to see you in clinic today. To Page the VAD Coordinator on call, dial [phone] option #4 and ask to speak to the VAD coordinator on call.

## 2018-07-06 NOTE — PROGRESS NOTES
Preliminary Device Interrogation Results.  Final physician signed paceart report to be scanned and attached.    Patient seen in clinic for evaluation and iterative programming of Medtronic Viva XT CRT-D multi lead ICD per MD orders. Patient has an appointment to see Dr. Montgomery today.  Normal ICD function.  5 NSVT and 3 SVT-Wavelet episodes recorded. Patient noted the last NSVT on June 28th for 4 seconds he was symptomatic with a faint and lightheadedness feeling. The NSVT epsisodes  1-4 seconds in length with at rates >200 bpm and SVT are 1:1 @ rates of 160's bpm. An additional 21 AT/AF <1 min episodes had no further diagnostic storage.626 Ventricular sensing episodes lasting 11-23 seconds collected since last interrogation. Intrinsic rhythm = SR @ 70 bpm.  AP = 1.5%.   = 87.1%. VSR 6.6%. OptiVol fluid index is at baseline.  Estimated battery longevity to MARVA = 4.7 years. Lead trends appear stable.  Patient reports that he is feeling well and denies complaints.  Plan for patient to send a remote transmission in 3 months and return to clinic in 6 months.    multi lead ICD

## 2018-07-06 NOTE — MR AVS SNAPSHOT
Jim Willingham   7/6/2018   Anticoagulation Therapy Visit    Description:  63 year old male   Provider:  Kirsty Bermudez, RN   Department:  [facility]           INR as of 7/6/2018     Today's INR 2.56      Anticoagulation Summary as of 7/6/2018     INR goal 2.0-3.0   Today's INR 2.56   Full warfarin instructions 5 mg on Fri; 7.5 mg all other days   Next INR check 7/16/2018    Indications   LVAD (left ventricular assist device) present (H) [Z95.811]  Long-term (current) use of anticoagulants [Z79.01] [Z79.01]         July 2018 Details    Sun Mon Tue Wed Thu Fri Sat     1               2               3               4               5               6      5 mg   See details      7      7.5 mg           8      7.5 mg         9      7.5 mg         10      7.5 mg         11      7.5 mg         12      7.5 mg         13      5 mg         14      7.5 mg           15      7.5 mg         16            17               18               19               20               21                 22               23               24               25               26               27               28                 29               30               31                    Date Details   07/06 This INR check       Date of next INR:  7/16/2018         How to take your warfarin dose     To take:  5 mg Take 1 of the 5 mg tablets.    To take:  7.5 mg Take 1.5 of the 5 mg tablets.

## 2018-07-06 NOTE — PROGRESS NOTES
HPI:   Jim Willingham is a 63 year old male with a past medical history of NICM (EF 20%) s/p HM II LVAD placement (6/19/17) complicated by arrhythmias, WALTER, and small left pleural effusion, CKD Stage III, DM Type II, CECILIA, obesity, HTN, COPD, and asthma who is here for heart failure and LVAD follow up.     Today he reports feeling well overall, with some good days and bad days. His bad days are notable for some fatigue and dyspnea with exertion, although overall he reports feeling much better than he did prior to LVAD placement. His wife is also concerned that he has been more fatigued over the past few months than he was previously. He denies any chest pain, edema, palpitations, orthopnea, PND. He does have some occasional dizziness but no syncope. He denies fevers, chills, driveline redness, tenderness, or discharge. No LVAD alarms and denies any stroke symptoms or blood in the urine or stools.        PAST MEDICAL HISTORY:  Past Medical History:   Diagnosis Date     Benign essential hypertension 5/11/2017     Cardiomyopathy, unspecified 5/8/2017     CKD (chronic kidney disease) stage 3, GFR 30-59 ml/min 5/11/2017     Depression 5/11/2017     Diabetes mellitus (H) 5/11/2017     H/O gastric bypass 5/11/2017     ICD (implantable cardioverter-defibrillator), biventricular, in situ 5/11/2017     NICM (nonischemic cardiomyopathy) (H)/ EF 20% 5/11/2017    ECHO: LVEDd. 7.66 cm, Restrictive pattern , Severe mitral valve regurgitation     CECILIA (obstructive sleep apnea) 5/11/2017     Paroxysmal atrial fibrillation (H) 5/11/2017     Paroxysmal VT (H) 5/11/2017     Uncomplicated asthma      Vitamin B12 deficiency (non anemic) 5/11/2017       FAMILY HISTORY:  Family History   Problem Relation Age of Onset     Cerebrovascular Disease Mother      Diabetes Mother      Hypertension Mother      Coronary Artery Disease Father      Type 2 Diabetes Father      SOCIAL HISTORY:  Social History     Social History     Marital status:       Spouse name: N/A     Number of children: N/A     Years of education: N/A     Social History Main Topics     Smoking status: Former Smoker     Quit date: 1995     Smokeless tobacco: Not on file     Alcohol use No     Drug use: Not on file     Sexual activity: Yes     Partners: Female     Other Topics Concern     Parent/Sibling W/ Cabg, Mi Or Angioplasty Before 65f 55m? No     Social History Narrative       CURRENT MEDICATIONS:    Current Outpatient Prescriptions on File Prior to Visit:  acetaminophen (TYLENOL) 325 MG tablet Take 2 tablets (650 mg) by mouth every 6 hours   albuterol (ALBUTEROL) 108 (90 BASE) MCG/ACT Inhaler Inhale 2 puffs into the lungs every 4 hours as needed for shortness of breath / dyspnea or wheezing   allopurinol (ZYLOPRIM) 100 MG tablet Take 1 tablet (100 mg) by mouth daily   bumetanide (BUMEX) 1 MG tablet Take 1 tablet (1 mg) by mouth 2 times daily   citalopram (CELEXA) 20 MG tablet Take 20 mg by mouth daily   cyanocobalamin (VITAMIN B12) 1000 MCG/ML injection Inject 1,000 mcg into the muscle every 30 days   fluticasone-vilanterol (BREO ELLIPTA) 200-25 MCG/INH oral inhaler Inhale 1 puff into the lungs daily   gabapentin (NEURONTIN) 300 MG capsule Take 1 capsule (300 mg) by mouth 3 times daily   ipratropium - albuterol 0.5 mg/2.5 mg/3 mL (DUONEB) 0.5-2.5 (3) MG/3ML neb solution Take 3 mLs by nebulization every 4 hours as needed for shortness of breath / dyspnea or wheezing   losartan (COZAAR) 25 MG tablet Take 1 tablet (25 mg) by mouth daily   metFORMIN (GLUCOPHAGE) 500 MG tablet Take 0.5 tablets (250 mg) by mouth 2 times daily (with meals)   metoprolol succinate (TOPROL-XL) 25 MG 24 hr tablet Take 1.5 tablets (37.5 mg) by mouth 2 times daily   montelukast (SINGULAIR) 10 MG tablet Take 10 mg by mouth At Bedtime   pantoprazole (PROTONIX) 40 MG EC tablet Take 1 tablet (40 mg) by mouth every morning   potassium chloride (KLOR-CON) 20 MEQ Packet Take 20 mEq by mouth daily   predniSONE  "(DELTASONE) 20 MG tablet Take 0.5 tablets (10 mg) by mouth daily   spironolactone (ALDACTONE) 25 MG tablet Take 1 tablet (25 mg) by mouth daily   traMADol (ULTRAM) 50 MG tablet Take 2 tablets (100 mg) by mouth every 6 hours as needed for moderate pain or breakthrough pain   umeclidinium (INCRUSE ELLIPTA) 62.5 MCG/INH oral inhaler Inhale 1 puff into the lungs daily   warfarin (COUMADIN) 3 MG tablet Use as directed.   warfarin (COUMADIN) 5 MG tablet Take 2-2.5 tablets daily or as directed by coumadin clinic.   zinc sulfate (ZINCATE) 220 (50 ZN) MG capsule Take 220 mg by mouth 2 times daily   aspirin 81 MG chewable tablet 1 tablet (81 mg) by Oral or Feeding Tube route daily (Patient not taking: Reported on 7/6/2018)   polyethylene glycol (MIRALAX/GLYCOLAX) Packet Take 17 g by mouth daily as needed for constipation (for no bm in 1 day) (Patient not taking: Reported on 7/6/2018)   senna-docusate (SENOKOT-S;PERICOLACE) 8.6-50 MG per tablet Take 2 tablets by mouth 2 times daily as needed for constipation (Patient not taking: Reported on 7/6/2018)     Current Facility-Administered Medications on File Prior to Visit:  [COMPLETED] lidocaine (LMX4) cream       EXAM:  BP (!) 82/0 (BP Location: Right arm, Cuff Size: Adult Large)  Pulse 72  Ht 1.727 m (5' 8\")  Wt 124.1 kg (273 lb 11.2 oz)  SpO2 98%  BMI 41.62 kg/m2  General: appears comfortable, alert and articulate  Head: normocephalic, atraumatic  Eyes: anicteric sclera, EOMI  Neck: no adenopathy, neck supple  Orophyarynx: moist mucosa, no lesions, dentition intact  Heart: LVAD hum present, no murmur, gallop, rub.  Elevated JVD.  Lungs: clear, no rales or wheezing  Abdomen: soft, non-tender, bowel sounds present, no hepatomegaly.    Extremities: no clubbing, cyanosis, trace edema.    Neurological: normal speech and affect, no gross motor deficits      Labs:  Reviewed          Most recent echocardiogram:  Interpretation Summary  Technically difficult study.  HMII 9600 " RPM  Severely (EF 10-20%) reduced left ventricular function is present. LVAD  cannula in the apex is not well seen. Velocities of the inflow and outflow not  interpretable.  Moderate right ventricular dilatation present. Global right ventricular  function is moderately reduced.  There is mitral and tricuspid regurgitation that is difficult to quantify on  this exam.  The inferior vena cava was normal in size with preserved respiratory  variability.  No pericardial effusion is present.  _____________________________________________________________________________  __        Left Ventricle  Severely (EF 10-20%) reduced left ventricular function is present. LVAD  cannula in the apex is not well seen. Velocities of the inflow and outflow not  interpretable.     Right Ventricle  Moderate right ventricular dilatation present. Global right ventricular  function is moderately reduced.     Aortic Valve  Aortic valve partially opens during each cardiac cycle.     Vessels  The inferior vena cava was normal in size with preserved respiratory  variability.        Pericardium  No pericardial effusion is present.     Compared to Previous Study  This study was compared with the study from 4.11.18, biventricular function  unchanged .  _____________________________________________________________________________  __     MMode/2D Measurements & Calculations  IVSd: 1.1 cm  LVIDd: 6.2 cm  LVIDs: 5.7 cm  LVPWd: 1.1 cm  FS: 8.8 %  LV mass(C)d: 291.2 grams  LV mass(C)dI: 127.4 grams/m2  RWT: 0.36        Doppler Measurements & Calculations  PA acc time: 0.10 sec        _____________________________________________________________________________  __        Report approved by: Shayne Page 07/06/2018 12:24 PM          Assessment and Plan:    Jim Willingham is a 63 year old male with a past medical history of NICM (EF 20%) s/p HM II LVAD. Overall he has been doing well but does appear volume overloaded today. His right heart cath today is  also suggestive of some right ventricular dysfunction with normal wedge pressure of 11, and elevated right atrial pressure of 17, and mildly decreased cardiac output. We will increase his bumex to 2mg in the morning and 1mg in the evening. We also decreased his LVAD speed from 9600 to 9400 RPM. We also decreased his metoprolol to 25mg BID. We will repeat labs in 1 week to recheck his kidney function which was stable today at 1.58. We would like to consider transplant in the future however he needs to lose some more weight. Also his pulmonary function has been abnormal with his last PFTs showing FEV1 at 32% predicted. If he is able to lose some weight then we may repeat PFTs later this year. He is also iron deficient with mild anemia and we will start IV iron replacement.       1.  Chronic systolic heart failure secondary to NICM.    Stage D    NYHA Class II  ACEi/ARB: losartan 25mg  BB: Decrease Toprol XL to 25 mg twice daily.  Aldosterone antagonist: Aldactone to 25 mg daily.   SCD prophylaxis: CRT-D  Fluid status: hypervolemic, increase bumex to 2mg in AM and 1mg in PM     2.  LVAD S/P HM II LVAD 6/19/17:  MAP controlled on current regimen.  Anticoagulation: Warfarin INR goal 2-3. Therapeutic today  Antiplatelet: restart ASA dose 81 mg daily.  LDH:  352 today      3.  CKD Stage III:Overall stable, 1.5 today.     4.  HTN:  Controlled on Toprol XL and losartan       5.  Paroxysmal Atrial Fibrillation. Anticoagulated and rate controlled with Toprol XL.       6. Iron deficiency: will start IV iron replacement      This patient was seen and examined with Dr. Montgomery.    Nba Pleitez MD  Advanced Heart Failure Fellow, PGY-7  Pager: [phone]      I have seen and examined the patient with the house staff on July 6, 2018 and agree with the outlined assessment and plan.      Delisa Montgomery MD   of Medicine   [facility] Division of Cardiology

## 2018-07-06 NOTE — PATIENT INSTRUCTIONS
It was a pleasure to see you in clinic today.  Please do not hesitate to call with any questions or concerns.  We have a remote  Transmission scheduled for you on October 9th 2018. We look forward to seeing you in clinic at your next device check in 6 months.    Radha Sterling RN  Electrophysiology Nurse Clinician  [facility]    During Business Hours Please Call:  [phone]  After Hours Please Call:  [phone] - select option #4 and ask for job code [phone]

## 2018-07-06 NOTE — IP AVS SNAPSHOT
MRN:[id number]                      After Visit Summary   7/6/2018    Jim Willingham    MRN: [id number]           Visit Information        Department      7/6/2018  7:56 AM [address] [facility]          Review of your medicines      UNREVIEWED medicines. Ask your doctor about these medicines        Dose / Directions    acetaminophen 325 MG tablet   Commonly known as:  TYLENOL   Used for:  Acute post-operative pain        Dose:  650 mg   Take 2 tablets (650 mg) by mouth every 6 hours   Quantity:  100 tablet   Refills:  0       ALDACTONE 25 MG tablet   Used for:  LVAD (left ventricular assist device) present (H), Chronic systolic congestive heart failure (H), Nerve pain, Acute idiopathic gout, unspecified site   Generic drug:  spironolactone        Dose:  25 mg   Take 1 tablet (25 mg) by mouth daily   Quantity:  30 tablet   Refills:  5       allopurinol 100 MG tablet   Commonly known as:  ZYLOPRIM   Used for:  Acute idiopathic gout, unspecified site        Dose:  100 mg   Take 1 tablet (100 mg) by mouth daily   Quantity:  60 tablet   Refills:  5       aspirin 81 MG chewable tablet   Used for:  LVAD (left ventricular assist device) present (H)        Dose:  81 mg   1 tablet (81 mg) by Oral or Feeding Tube route daily   Quantity:  36 tablet   Refills:  0       BREO ELLIPTA 200-25 MCG/INH oral inhaler   Generic drug:  fluticasone-vilanterol        Dose:  1 puff   Inhale 1 puff into the lungs daily   Refills:  0       bumetanide 1 MG tablet   Commonly known as:  BUMEX   Used for:  LVAD (left ventricular assist device) present (H)        Dose:  1 mg   Take 1 tablet (1 mg) by mouth 2 times daily   Quantity:  180 tablet   Refills:  3       celeXA 20 MG tablet   Generic drug:  citalopram        Dose:  20 mg   Take 20 mg by mouth daily   Refills:  0       cyanocobalamin 1000 MCG/ML injection   Commonly known as:  VITAMIN B12        Dose:  1000 mcg   Inject 1,000 mcg into the muscle every 30 days   Refills:   0       enoxaparin 120 MG/0.8ML injection   Commonly known as:  LOVENOX   Used for:  LVAD (left ventricular assist device) present (H), Long-term (current) use of anticoagulants        Please inject entire contents of Syringe, 120mg of Lovenox subcutaneously every twelve hours daily.   Quantity:  10 Syringe   Refills:  1       gabapentin 300 MG capsule   Commonly known as:  NEURONTIN   Used for:  Nerve pain, LVAD (left ventricular assist device) present (H), Acute idiopathic gout, unspecified site, Chronic systolic congestive heart failure (H)        Dose:  300 mg   Take 1 capsule (300 mg) by mouth 3 times daily   Quantity:  90 capsule   Refills:  0       INCRUSE ELLIPTA 62.5 MCG/INH oral inhaler   Generic drug:  umeclidinium        Dose:  1 puff   Inhale 1 puff into the lungs daily   Refills:  0       ipratropium - albuterol 0.5 mg/2.5 mg/3 mL 0.5-2.5 (3) MG/3ML neb solution   Commonly known as:  DUONEB        Dose:  3 mL   Take 3 mLs by nebulization every 4 hours as needed for shortness of breath / dyspnea or wheezing   Refills:  0       losartan 25 MG tablet   Commonly known as:  COZAAR   Used for:  LVAD (left ventricular assist device) present (H), Chronic systolic congestive heart failure (H)        Dose:  25 mg   Take 1 tablet (25 mg) by mouth daily   Quantity:  90 tablet   Refills:  3       metFORMIN 500 MG tablet   Commonly known as:  GLUCOPHAGE   Used for:  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, without long-term current use of insulin, unspecified CKD stage (H)        Dose:  250 mg   Take 0.5 tablets (250 mg) by mouth 2 times daily (with meals)   Quantity:  60 tablet   Refills:  0       metoprolol succinate 25 MG 24 hr tablet   Commonly known as:  TOPROL-XL   Used for:  Chronic systolic congestive heart failure (H), LVAD (left ventricular assist device) present (H)        Dose:  37.5 mg   Take 1.5 tablets (37.5 mg) by mouth 2 times daily   Quantity:  180 tablet    Refills:  5       pantoprazole 40 MG EC tablet   Commonly known as:  PROTONIX   Used for:  Gastroesophageal reflux disease without esophagitis        Dose:  40 mg   Take 1 tablet (40 mg) by mouth every morning   Quantity:  60 tablet   Refills:  5       polyethylene glycol Packet   Commonly known as:  MIRALAX/GLYCOLAX   Used for:  Acute post-operative pain        Dose:  17 g   Take 17 g by mouth daily as needed for constipation (for no bm in 1 day)   Quantity:  7 packet   Refills:  0       potassium chloride 20 MEQ Packet   Commonly known as:  KLOR-CON   Used for:  LVAD (left ventricular assist device) present (H), Chronic systolic congestive heart failure (H)        Dose:  20 mEq   Take 20 mEq by mouth daily   Quantity:  60 packet   Refills:  5       predniSONE 20 MG tablet   Commonly known as:  DELTASONE   Used for:  Acute idiopathic gout, unspecified site, LVAD (left ventricular assist device) present (H), Nerve pain, Chronic systolic congestive heart failure (H)        Dose:  10 mg   Take 0.5 tablets (10 mg) by mouth daily   Quantity:  20 tablet   Refills:  0       PROAIR  (90 Base) MCG/ACT Inhaler   Generic drug:  albuterol        Dose:  2 puff   Inhale 2 puffs into the lungs every 4 hours as needed for shortness of breath / dyspnea or wheezing   Refills:  0       senna-docusate 8.6-50 MG per tablet   Commonly known as:  SENOKOT-S;PERICOLACE   Used for:  Acute post-operative pain        Dose:  2 tablet   Take 2 tablets by mouth 2 times daily as needed for constipation   Quantity:  100 tablet   Refills:  0       SINGULAIR 10 MG tablet   Generic drug:  montelukast        Dose:  10 mg   Take 10 mg by mouth At Bedtime   Refills:  0       traMADol 50 MG tablet   Commonly known as:  ULTRAM   Used for:  Acute post-operative pain        Dose:  100 mg   Take 2 tablets (100 mg) by mouth every 6 hours as needed for moderate pain or breakthrough pain   Quantity:  28 tablet   Refills:  0       traZODone 50 MG tablet    Commonly known as:  DESYREL   Used for:  Acute post-operative pain, LVAD (left ventricular assist device) present (H)        Take 50 mg by mouth nightly x one week then reduce to 25 mg (1/2 tab) by mouth nightly x one week then stop.   Quantity:  11 tablet   Refills:  0       * warfarin 3 MG tablet   Commonly known as:  COUMADIN   Indication:  inr=2.56   Used for:  LVAD (left ventricular assist device) present (H)        Use as directed.   Quantity:  30 tablet   Refills:  0       * warfarin 5 MG tablet   Commonly known as:  COUMADIN   Used for:  LVAD (left ventricular assist device) present (H), Chronic systolic congestive heart failure (H)        Take 2-2.5 tablets daily or as directed by coumadin clinic.   Quantity:  75 tablet   Refills:  5       zinc sulfate 220 (50 Zn) MG capsule   Commonly known as:  ZINCATE        Dose:  220 mg   Take 220 mg by mouth 2 times daily   Refills:  0       * Notice:  This list has 2 medication(s) that are the same as other medications prescribed for you. Read the directions carefully, and ask your doctor or other care provider to review them with you.             Protect others around you: Learn how to safely use, store and throw away your medicines at www.disposemymeds.org.         Follow-ups after your visit        Your next 10 appointments already scheduled     Jul 06, 2018  9:30 AM CDT   Procedure - 2.5 hour with U2A ROOM 17   [address] [facility] [place] ([facility])    [address] [address]               Jul 06, 2018 10:30 AM CDT   Heart Cath Right Heart [id number]   [facility]    [address] [address]   [phone]            Jul 06, 2018  2:00 PM CDT   (Arrive by 1:45 PM)   Implanted Defibulator with Uc Cv Device [address] [place] [address] ([facility])    [address] [place] [address]  [address]  [facility] [id number] [phone]            Jul 06, 2018  2:30 PM CDT   (Arrive by 2:15 PM)   Ventricular Assist Device with Delisa Montgomery MD   [facility] ([facility])    [address] [place] [address]  [address]  [facility] [id number] [phone]              Future tests that were ordered for you     Heart Cath Right heart cath                  Care Instructions        Further instructions from your care team       [facility]                        Interventional Cardiology  Discharge Instructions   Post Right Heart Cath      AFTER YOU GO HOME:    DO drink plenty of fluids    DO resume your regular diet and medications unless otherwise instructed by your Primary Physician    Do Not scrub the procedure site vigorously    No lotion or powder to the puncture site for 3 days    CALL YOUR PRIMARY PHYSICIAN IF: You may resume all normal activity.  Monitor neck site for bleeding, swelling, or voice changes. If you notice bleeding or swelling immediately apply pressure to the site and call number below to speak with Cardiology Fellow.  If you experience any changes in your breathing you should call your doctor immediately or come to the closest Emergency Department.  Do not drive yourself.    ADDITIONAL INSTRUCTIONS: Medications: You are to resume all home medications including anticoagulation therapy unless otherwise advised by your primary cardiologist or nurse coordinator.    Follow Up: Per your primary cardiology team    If you have any questions or concerns regarding your procedure site please call [phone] at anytime and ask for Cardiology Fellow on call.  They are available 24 hours a day.  You may also contact the Cardiology Clinic after hours number at [phone].                                                       Telephone Numbers [phone] Monday-Friday 8:00 am to 4:30 pm    [phone] After 4:30 pm  Monday-Friday, Weekends & Holidays  Ask for Interventional Cardiologist on call. Someone is on call 24 hours/day   [facility] toll free number [phone] Monday-Friday 8:00 am to 4:30 pm   [facility] Emergency Dept [phone]                    Additional Information About Your Visit        Care EveryWhere ID     This is your Care EveryWhere ID. This could be used by other organizations to access your [facility] medical records  [id number]         Primary Care Provider Office Phone # Fax #    Jovany Salas [phone]      Equal Access to Services     [facility]: Hadii andrea chopra hadnatanael Sobrandon, waaxda lumarcie, qaybta kaalmada neo, kendra marquez . So [facility] [phone].    ATENCIÓN: Si habla español, tiene a roger disposición servicios gratuitos de asistencia lingüística. [facility] [phone].    We comply with applicable federal civil rights laws and [place] laws. We do not discriminate on the basis of race, color, national origin, age, disability, sex, sexual orientation, or gender identity.            Thank you!     Thank you for choosing [facility] for your care. Our goal is always to provide you with excellent care. Hearing back from our patients is one way we can continue to improve our services. Please take a few minutes to complete the written survey that you may receive in the mail after you visit with us. Thank you!             Medication List: This is a list of all your medications and when to take them. Check marks below indicate your daily home schedule. Keep this list as a reference.      Medications           Morning Afternoon Evening Bedtime As Needed    acetaminophen 325 MG tablet   Commonly known as:  TYLENOL   Take 2 tablets (650 mg) by mouth every 6 hours                                ALDACTONE 25 MG tablet   Take 1 tablet (25 mg) by mouth daily   Generic drug:  spironolactone                                allopurinol 100 MG tablet   Commonly  known as:  ZYLOPRIM   Take 1 tablet (100 mg) by mouth daily                                aspirin 81 MG chewable tablet   1 tablet (81 mg) by Oral or Feeding Tube route daily                                BREO ELLIPTA 200-25 MCG/INH oral inhaler   Inhale 1 puff into the lungs daily   Generic drug:  fluticasone-vilanterol                                bumetanide 1 MG tablet   Commonly known as:  BUMEX   Take 1 tablet (1 mg) by mouth 2 times daily                                celeXA 20 MG tablet   Take 20 mg by mouth daily   Generic drug:  citalopram                                cyanocobalamin 1000 MCG/ML injection   Commonly known as:  VITAMIN B12   Inject 1,000 mcg into the muscle every 30 days                                enoxaparin 120 MG/0.8ML injection   Commonly known as:  LOVENOX   Please inject entire contents of Syringe, 120mg of Lovenox subcutaneously every twelve hours daily.                                gabapentin 300 MG capsule   Commonly known as:  NEURONTIN   Take 1 capsule (300 mg) by mouth 3 times daily                                INCRUSE ELLIPTA 62.5 MCG/INH oral inhaler   Inhale 1 puff into the lungs daily   Generic drug:  umeclidinium                                ipratropium - albuterol 0.5 mg/2.5 mg/3 mL 0.5-2.5 (3) MG/3ML neb solution   Commonly known as:  DUONEB   Take 3 mLs by nebulization every 4 hours as needed for shortness of breath / dyspnea or wheezing                                losartan 25 MG tablet   Commonly known as:  COZAAR   Take 1 tablet (25 mg) by mouth daily                                metFORMIN 500 MG tablet   Commonly known as:  GLUCOPHAGE   Take 0.5 tablets (250 mg) by mouth 2 times daily (with meals)                                metoprolol succinate 25 MG 24 hr tablet   Commonly known as:  TOPROL-XL   Take 1.5 tablets (37.5 mg) by mouth 2 times daily                                pantoprazole 40 MG EC tablet   Commonly known as:  PROTONIX   Take 1  tablet (40 mg) by mouth every morning                                polyethylene glycol Packet   Commonly known as:  MIRALAX/GLYCOLAX   Take 17 g by mouth daily as needed for constipation (for no bm in 1 day)                                potassium chloride 20 MEQ Packet   Commonly known as:  KLOR-CON   Take 20 mEq by mouth daily                                predniSONE 20 MG tablet   Commonly known as:  DELTASONE   Take 0.5 tablets (10 mg) by mouth daily                                PROAIR  (90 Base) MCG/ACT Inhaler   Inhale 2 puffs into the lungs every 4 hours as needed for shortness of breath / dyspnea or wheezing   Generic drug:  albuterol                                senna-docusate 8.6-50 MG per tablet   Commonly known as:  SENOKOT-S;PERICOLACE   Take 2 tablets by mouth 2 times daily as needed for constipation                                SINGULAIR 10 MG tablet   Take 10 mg by mouth At Bedtime   Generic drug:  montelukast                                traMADol 50 MG tablet   Commonly known as:  ULTRAM   Take 2 tablets (100 mg) by mouth every 6 hours as needed for moderate pain or breakthrough pain                                traZODone 50 MG tablet   Commonly known as:  DESYREL   Take 50 mg by mouth nightly x one week then reduce to 25 mg (1/2 tab) by mouth nightly x one week then stop.                                * warfarin 3 MG tablet   Commonly known as:  COUMADIN   Use as directed.                                * warfarin 5 MG tablet   Commonly known as:  COUMADIN   Take 2-2.5 tablets daily or as directed by coumadin clinic.                                zinc sulfate 220 (50 Zn) MG capsule   Commonly known as:  ZINCATE   Take 220 mg by mouth 2 times daily                                * Notice:  This list has 2 medication(s) that are the same as other medications prescribed for you. Read the directions carefully, and ask your doctor or other care provider to review them with you.

## 2018-07-06 NOTE — PROGRESS NOTES
Patient returned to  s/p [facility]. RIJ site is clean, dry, intact. No bleeding and no hematoma noted. VSS. Patient denies pain. Patient tolerating PO intake of turkey sandwich and water. Patient to shuttle to clinic for next appointment.

## 2018-07-06 NOTE — PROCEDURES
FINAL CARDIAC CATH REPORT:     PROCEDURES PERFORMED:   Right Heart Catheterization    PHYSICIANS:  Attending Physician: Natan Rivera MD, PhD  Interventional Cardiology Fellow: None  Cardiology Fellow: Florentino Orellana MD    INDICATION:  Jim Willingham is a 63 year old male with history of NICM, s/p HM2 on 6/19/17 with recurrent volume overload who presents for right heart catheterization.    DESCRIPTION:  1. Consent obtained with discussion of risks.  All questions were answered.  2. Sterile prep and procedure.  3. Location with Sheaths:   Rt IJ  7 Fr 10 cm [short]   4. Access: Local anesthetic with lidocaine.  A micropuncture 21 gauge needle with ultrasound guidance was used to establish vascular access using a modified Seldinger technique.  5. Diagnostic Catheters:   7 Fr  Skillman Aashish  6. Estimated blood loss: < 5 ml    MEDICATIONS:  Local anesthesia was given over the RIJ.    Heart rate, BP, respiration, oxygen saturation and patient responses were monitored throughout the procedure with the assistance of the RN under my supervision.    Procedures:    HEMODYNAMICS:  BSA 2.38 m2  1.  bpm  2. /56/78 mmHg  3. RA 19//17/17   4. RV 34/17  5. PA 34/25/28   6. PCW 11   7. PA sat 56%   8. PCW sat not obtained  9. Hgb 10.3 g/dL   10. Kee CO 5.3   11. Kee CI 2.3   12. TD CO 5.0   13. TD CI 2.2   14. PVR 3.2   15.   16. TPR 5.3       Sheath Removal:  The RIJ sheath was manually removed in the cardiac catheterization laboratory.    Contrast: Isovue, 0 ml     Fluoroscopy Time: 0.5 min    COMPLICATIONS:  1. None    SUMMARY:   Elevated right sided filling pressures  Normal left sided filling pressures  Mild pulmonary hypertension, pre-capillary  Mildly reduced cardiac output    PLAN:   Discharge today per protocol  Follow up with Dr. Montgomery    The attending interventional cardiologist was present and supervised all critical aspects the procedure.    Findings discussed with patient/Dr. Montgomery at end of  case.    See CVIS report for final draft.    Florentino Orellana MD  Cardiology Fellow    Natan Rivera MD, PhD  Cardiology Staff    ATTENDING ATTESTATION: I was present and supervised the cardiology fellow throughout the procedure and reviewed the findings with the fellow at the completion of the procedure.  I agree with the documentation above.    Natan Rivera MD, PhD  [facility]  Cardiovascular Division  [facility]     July 6, 2018

## 2018-07-06 NOTE — MR AVS SNAPSHOT
After Visit Summary   7/6/2018    Jim Willingham    MRN: [id number]           Patient Information     Date Of Birth          1955        Visit Information        Provider Department      7/6/2018 2:30 PM Delisa Montgomery MD [facility]        Today's Diagnoses     LVAD (left ventricular assist device) present (H)    -  1    Chronic systolic congestive heart failure (H)          Care Instructions    Medication and Other Changes:  1. Please increase your Bumex to 2mg in the morning and 1mg in the afternoon. We may need to decrease this dose next week.   2. Please decrease your Toprol to 25mg, twice a day.   3. We will send order for IV iron infusions.   4. We decreased you speed today to 9400.   5. Please start your 81mg of aspirin again.     Follow-up:  1. We will send orders for labs in about a week.   2. Please schedule an appointment with an NP in one month.   3. Please schedule an appointment with Dr. Montgomery and the device clinic in [facility]      Page the VAD Coordinator on call if you gain more than 3 lb in a day or 5 in a week. Please also page if you feel unwell or have alarms.     Great to see you in clinic today. To Page the VAD Coordinator on call, dial [phone] option [phone] and ask to speak to the VAD coordinator on call.             Follow-ups after your visit        Your next 10 appointments already scheduled     Jul 13, 2018  9:30 AM CDT   LAB with LAB FIRST FLOOR [facility] ([facility])    [address] [place] [address] [phone]           Please do not eat 10-12 hours before your appointment if you are coming in fasting for labs on lipids, cholesterol, or glucose (sugar). This does not apply to pregnant women. Water, hot tea and black coffee (with nothing added) are okay. Do not drink other fluids, diet soda or chew gum.            Aug 06, 2018  1:30 PM CDT   Lab with  LAB   [facility]    [address]  1st Floor  [facility] [address]   [phone]            Aug 06, 2018  2:00 PM CDT   (Arrive by 1:45 PM)   Ventricular Assist Device with Cate Marshall NP   Formerly named [facility])    [address] [place] [address]  [address]  [facility] [address]   [phone]            Dec 07, 2018  9:30 AM CST   Lab with UC LAB   [facility])    [address] [place] [address]  1st Floor  [facility] [address]   [phone]            Dec 07, 2018 10:00 AM CST   (Arrive by 9:45 AM)   Implanted Defibulator with Uc Cv Device 1   [facility] ([facility])    [address] [place] [address]  [address] [place] [address]   [phone]            Dec 07, 2018 10:30 AM CST   (Arrive by 10:15 AM)   Ventricular Assist Device with Delisa Montgomery MD   [facility] ([facility])    [address] [place] [address]  [address] [place] [address]   [phone]              Future tests that were ordered for you today     Open Standing Orders        Priority Remaining Interval Expires Ordered    INR Routine 99/99 weekly or as needed 7/6/2019 7/6/2018          Open Future Orders        Priority Expected Expires Ordered    Follow-Up with Device Clinic - 3 months Routine 10/4/2018 1/2/2019 7/6/2018    Follow-Up with Device Clinic-6 months Routine 1/2/2019 4/2/2019 7/6/2018    Comprehensive metabolic panel Routine 7/6/2018 7/27/2018 7/5/2018    CBC with platelets Routine 7/6/2018 7/27/2018 7/5/2018    INR Routine 7/6/2018 7/27/2018 7/5/2018    Lactate Dehydrogenase Routine 7/6/2018 7/27/2018 7/5/2018            Who to contact     If you have questions or need follow up information about today's clinic visit or your schedule please contact [facility] directly at [phone].  Normal or non-critical lab and imaging results will be communicated to you  "by MyChart, letter or phone within 4 business days after the clinic has received the results. If you do not hear from us within 7 days, please contact the clinic through MyChart or phone. If you have a critical or abnormal lab result, we will notify you by phone as soon as possible.  Submit refill requests through [employer] or call your pharmacy and they will forward the refill request to us. Please allow 3 business days for your refill to be completed.          Additional Information About Your Visit        Care EveryWhere ID     This is your Care EveryWhere ID. This could be used by other organizations to access your [facility] medical records  [id number]        Your Vitals Were     Pulse Height Pulse Oximetry BMI (Body Mass Index)          72 1.727 m (5' 8\") 98% 41.62 kg/m2         Blood Pressure from Last 3 Encounters:   07/06/18 (!) 82/0   07/06/18 (!) 108/94   05/04/18 (!) 76/0    Weight from Last 3 Encounters:   07/06/18 124.1 kg (273 lb 11.2 oz)   07/06/18 117.9 kg (260 lb)   05/04/18 121.9 kg (268 lb 12.8 oz)              Today, you had the following     No orders found for display         Today's Medication Changes          These changes are accurate as of 7/6/18  4:40 PM.  If you have any questions, ask your nurse or doctor.               These medicines have changed or have updated prescriptions.        Dose/Directions    metoprolol succinate 25 MG 24 hr tablet   Commonly known as:  TOPROL-XL   This may have changed:  how much to take   Used for:  Chronic systolic congestive heart failure (H), LVAD (left ventricular assist device) present (H)   Changed by:  Delisa Montgomery MD        Dose:  25 mg   Take 1 tablet (25 mg) by mouth 2 times daily   Quantity:  180 tablet   Refills:  5         Stop taking these medicines if you haven't already. Please contact your care team if you have questions.     oxyCODONE-acetaminophen 5-325 MG per tablet   Commonly known as:  PERCOCET   Stopped by:  Ulises, " Delisa Sprague MD                Where to get your medicines      These medications were sent to [facility] [place] [address] [place] [facility] [address] [place] [address]     Phone:  [phone]     bumetanide 1 MG tablet    metoprolol succinate 25 MG 24 hr tablet                Primary Care Provider Office Phone # Fax #    Jovany Salas [phone]       [address] [address]        Equal Access to Services     [facility]: Hadii aad ku hadasho Soomaali, waaxda luqadaha, qaybta kaalmada adeegyada, waxay idiin hayaan adeeg kharalinda larenaldo . So [facility] [phone].    ATENCIÓN: Si habla español, tiene a roger disposición servicios gratuitos de asistencia lingüística. [facility] [phone].    We comply with applicable federal civil rights laws and [place] laws. We do not discriminate on the basis of race, color, national origin, age, disability, sex, sexual orientation, or gender identity.            Thank you!     Thank you for choosing [facility]  for your care. Our goal is always to provide you with excellent care. Hearing back from our patients is one way we can continue to improve our services. Please take a few minutes to complete the written survey that you may receive in the mail after your visit with us. Thank you!             Your Updated Medication List - Protect others around you: Learn how to safely use, store and throw away your medicines at www.disposemymeds.org.          This list is accurate as of 7/6/18  4:40 PM.  Always use your most recent med list.                   Brand Name Dispense Instructions for use Diagnosis    acetaminophen 325 MG tablet    TYLENOL    100 tablet    Take 2 tablets (650 mg) by mouth every 6 hours    Acute post-operative pain       ALDACTONE 25 MG tablet   Generic drug:  spironolactone     30 tablet    Take 1 tablet (25 mg) by mouth daily     LVAD (left ventricular assist device) present (H), Chronic systolic congestive heart failure (H), Nerve pain, Acute idiopathic gout, unspecified site       allopurinol 100 MG tablet    ZYLOPRIM    60 tablet    Take 1 tablet (100 mg) by mouth daily    Acute idiopathic gout, unspecified site       aspirin 81 MG chewable tablet     36 tablet    1 tablet (81 mg) by Oral or Feeding Tube route daily    LVAD (left ventricular assist device) present (H)       BREO ELLIPTA 200-25 MCG/INH oral inhaler   Generic drug:  fluticasone-vilanterol      Inhale 1 puff into the lungs daily        bumetanide 1 MG tablet    BUMEX    180 tablet    Take 1 tablet (1 mg) by mouth 2 times daily    LVAD (left ventricular assist device) present (H)       celeXA 20 MG tablet   Generic drug:  citalopram      Take 20 mg by mouth daily        cyanocobalamin 1000 MCG/ML injection    VITAMIN B12     Inject 1,000 mcg into the muscle every 30 days        gabapentin 300 MG capsule    NEURONTIN    90 capsule    Take 1 capsule (300 mg) by mouth 3 times daily    Nerve pain, LVAD (left ventricular assist device) present (H), Acute idiopathic gout, unspecified site, Chronic systolic congestive heart failure (H)       INCRUSE ELLIPTA 62.5 MCG/INH oral inhaler   Generic drug:  umeclidinium      Inhale 1 puff into the lungs daily        ipratropium - albuterol 0.5 mg/2.5 mg/3 mL 0.5-2.5 (3) MG/3ML neb solution    DUONEB     Take 3 mLs by nebulization every 4 hours as needed for shortness of breath / dyspnea or wheezing        losartan 25 MG tablet    COZAAR    90 tablet    Take 1 tablet (25 mg) by mouth daily    LVAD (left ventricular assist device) present (H), Chronic systolic congestive heart failure (H)       metFORMIN 500 MG tablet    GLUCOPHAGE    60 tablet    Take 0.5 tablets (250 mg) by mouth 2 times daily (with meals)    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, without long-term current use of insulin, unspecified CKD  stage (H)       metoprolol succinate 25 MG 24 hr tablet    TOPROL-XL    180 tablet    Take 1 tablet (25 mg) by mouth 2 times daily    Chronic systolic congestive heart failure (H), LVAD (left ventricular assist device) present (H)       pantoprazole 40 MG EC tablet    PROTONIX    60 tablet    Take 1 tablet (40 mg) by mouth every morning    Gastroesophageal reflux disease without esophagitis       polyethylene glycol Packet    MIRALAX/GLYCOLAX    7 packet    Take 17 g by mouth daily as needed for constipation (for no bm in 1 day)    Acute post-operative pain       potassium chloride 20 MEQ Packet    KLOR-CON    60 packet    Take 20 mEq by mouth daily    LVAD (left ventricular assist device) present (H), Chronic systolic congestive heart failure (H)       predniSONE 20 MG tablet    DELTASONE    20 tablet    Take 0.5 tablets (10 mg) by mouth daily    Acute idiopathic gout, unspecified site, LVAD (left ventricular assist device) present (H), Nerve pain, Chronic systolic congestive heart failure (H)       PROAIR  (90 Base) MCG/ACT Inhaler   Generic drug:  albuterol      Inhale 2 puffs into the lungs every 4 hours as needed for shortness of breath / dyspnea or wheezing        senna-docusate 8.6-50 MG per tablet    SENOKOT-S;PERICOLACE    100 tablet    Take 2 tablets by mouth 2 times daily as needed for constipation    Acute post-operative pain       SINGULAIR 10 MG tablet   Generic drug:  montelukast      Take 10 mg by mouth At Bedtime        traMADol 50 MG tablet    ULTRAM    28 tablet    Take 2 tablets (100 mg) by mouth every 6 hours as needed for moderate pain or breakthrough pain    Acute post-operative pain       * warfarin 3 MG tablet    COUMADIN    30 tablet    Use as directed.    LVAD (left ventricular assist device) present (H)       * warfarin 5 MG tablet    COUMADIN    75 tablet    Take 2-2.5 tablets daily or as directed by coumadin clinic.    LVAD (left ventricular assist device) present (H), Chronic  systolic congestive heart failure (H)       zinc sulfate 220 (50 Zn) MG capsule    ZINCATE     Take 220 mg by mouth 2 times daily        * Notice:  This list has 2 medication(s) that are the same as other medications prescribed for you. Read the directions carefully, and ask your doctor or other care provider to review them with you.

## 2018-07-06 NOTE — DISCHARGE INSTRUCTIONS
[facility]                        Interventional Cardiology  Discharge Instructions   Post Right Heart Cath      AFTER YOU GO HOME:    DO drink plenty of fluids    DO resume your regular diet and medications unless otherwise instructed by your Primary Physician    Do Not scrub the procedure site vigorously    No lotion or powder to the puncture site for 3 days    CALL YOUR PRIMARY PHYSICIAN IF: You may resume all normal activity.  Monitor neck site for bleeding, swelling, or voice changes. If you notice bleeding or swelling immediately apply pressure to the site and call number below to speak with Cardiology Fellow.  If you experience any changes in your breathing you should call your doctor immediately or come to the closest Emergency Department.  Do not drive yourself.    ADDITIONAL INSTRUCTIONS: Medications: You are to resume all home medications including anticoagulation therapy unless otherwise advised by your primary cardiologist or nurse coordinator.    Follow Up: Per your primary cardiology team    If you have any questions or concerns regarding your procedure site please call [phone] at anytime and ask for Cardiology Fellow on call.  They are available 24 hours a day.  You may also contact the Cardiology Clinic after hours number at [phone].                                                       Telephone Numbers [phone] Monday-Friday 8:00 am to 4:30 pm    [phone] After 4:30 pm Monday-Friday, Weekends & Holidays  Ask for Interventional Cardiologist on call. Someone is on call 24 hours/day   [facility] toll free number [phone] Monday-Friday 8:00 am to 4:30 pm   [facility] Emergency Dept [phone]

## 2018-07-06 NOTE — PROGRESS NOTES
Discharge instructions given and pt voiced understanding. No scripts needed from pharmacy. RHC site is soft and flat. No hematoma. Up walking in room. Ate well for lunch. No complaint of discomfort. Adequate for discharge.

## 2018-07-06 NOTE — IP AVS SNAPSHOT
[address] [address]                                       After Visit Summary   7/6/2018    Jim Willingham    MRN: [id number]           After Visit Summary Signature Page     I have received my discharge instructions, and my questions have been answered. I have discussed any challenges I see with this plan with the nurse or doctor.    ..........................................................................................................................................  Patient/Patient Representative Signature      ..........................................................................................................................................  Patient Representative Print Name and Relationship to Patient    ..................................................               ................................................  Date                                            Time    ..........................................................................................................................................  Reviewed by Signature/Title    ...................................................              ..............................................  Date                                                            Time

## 2018-07-06 NOTE — MR AVS SNAPSHOT
After Visit Summary   7/6/2018    Jim Willingham    MRN: [id number]           Patient Information     Date Of Birth          1955        Visit Information        Provider Department      7/6/2018 2:00 PM 1,  Cv Device [facility] [place]        Today's Diagnoses     NICM (nonischemic cardiomyopathy) (H)/ EF 20%    -  1      Care Instructions    It was a pleasure to see you in clinic today.  Please do not hesitate to call with any questions or concerns.  We have a remote  Transmission scheduled for you on October 9th 2018. We look forward to seeing you in clinic at your next device check in 6 months.    Radha Sterling RN  Electrophysiology Nurse Clinician  [facility] [place]    During Business Hours Please Call:  [phone]  After Hours Please Call:  [phone] - select option #4 and ask for job code [phone]                      Follow-ups after your visit        Additional Services     Follow-Up with Device Clinic - 3 months           Follow-Up with Device Clinic-6 months                 Future tests that were ordered for you today     Open Standing Orders        Priority Remaining Interval Expires Ordered    INR Routine 99/99 weekly or as needed 7/6/2019 7/6/2018          Open Future Orders        Priority Expected Expires Ordered    Follow-Up with Device Clinic - 3 months Routine 10/4/2018 1/2/2019 7/6/2018    Follow-Up with Device Clinic-6 months Routine 1/2/2019 4/2/2019 7/6/2018    Comprehensive metabolic panel Routine 7/6/2018 7/27/2018 7/5/2018    CBC with platelets Routine 7/6/2018 7/27/2018 7/5/2018    INR Routine 7/6/2018 7/27/2018 7/5/2018    Lactate Dehydrogenase Routine 7/6/2018 7/27/2018 7/5/2018            Who to contact     If you have questions or need follow up information about today's clinic visit or your schedule please contact [facility] directly at [phone].  Normal or non-critical lab and imaging results will be communicated to you by Mary  letter or phone within 4 business days after the clinic has received the results. If you do not hear from us within 7 days, please contact the clinic through [employer] or phone. If you have a critical or abnormal lab result, we will notify you by phone as soon as possible.  Submit refill requests through [employer] or call your pharmacy and they will forward the refill request to us. Please allow 3 business days for your refill to be completed.          Additional Information About Your Visit        Care EveryWhere ID     This is your Care EveryWhere ID. This could be used by other organizations to access your [place] medical records  [id number]         Blood Pressure from Last 3 Encounters:   07/06/18 (!) 82/0   07/06/18 (!) 108/94   05/04/18 (!) 76/0    Weight from Last 3 Encounters:   07/06/18 124.1 kg (273 lb 11.2 oz)   07/06/18 117.9 kg (260 lb)   05/04/18 121.9 kg (268 lb 12.8 oz)              We Performed the Following     (47881)ICD DEVICE PROGRAMMING EVAL, MULTI LEAD ICD          Today's Medication Changes          These changes are accurate as of 7/6/18  4:13 PM.  If you have any questions, ask your nurse or doctor.               These medicines have changed or have updated prescriptions.        Dose/Directions    metoprolol succinate 25 MG 24 hr tablet   Commonly known as:  TOPROL-XL   This may have changed:  how much to take   Used for:  Chronic systolic congestive heart failure (H), LVAD (left ventricular assist device) present (H)   Changed by:  Delisa Montgomery MD        Dose:  25 mg   Take 1 tablet (25 mg) by mouth 2 times daily   Quantity:  180 tablet   Refills:  5         Stop taking these medicines if you haven't already. Please contact your care team if you have questions.     oxyCODONE-acetaminophen 5-325 MG per tablet   Commonly known as:  PERCOCET   Stopped by:  Delisa Montgomery MD                Where to get your medicines      These medications were sent to [facility]  [id number] [place] - [address] AT [facility] [address]  [address] [place] [address]     Phone:  [phone]     bumetanide 1 MG tablet    metoprolol succinate 25 MG 24 hr tablet                Primary Care Provider Office Phone # Fax #    Jovany Salas [phone]       [address] [address]        Equal Access to Services     [facility] : Hadii aad ku hadasho Soomaali, waaxda luqadaha, qaybta kaalmada adeegyada, waxay idiin hayaan adeeg kharash larenaldo . So [facility] [phone].    ATENCIÓN: Si habla español, tiene a roger disposición servicios gratuitos de asistencia lingüística. [facility] [phone].    We comply with applicable federal civil rights laws and [place] laws. We do not discriminate on the basis of race, color, national origin, age, disability, sex, sexual orientation, or gender identity.            Thank you!     Thank you for choosing [facility]  for your care. Our goal is always to provide you with excellent care. Hearing back from our patients is one way we can continue to improve our services. Please take a few minutes to complete the written survey that you may receive in the mail after your visit with us. Thank you!             Your Updated Medication List - Protect others around you: Learn how to safely use, store and throw away your medicines at www.disposemymeds.org.          This list is accurate as of 7/6/18  4:13 PM.  Always use your most recent med list.                   Brand Name Dispense Instructions for use Diagnosis    acetaminophen 325 MG tablet    TYLENOL    100 tablet    Take 2 tablets (650 mg) by mouth every 6 hours    Acute post-operative pain       ALDACTONE 25 MG tablet   Generic drug:  spironolactone     30 tablet    Take 1 tablet (25 mg) by mouth daily    LVAD (left ventricular assist device) present (H), Chronic systolic congestive heart failure (H), Nerve pain, Acute  idiopathic gout, unspecified site       allopurinol 100 MG tablet    ZYLOPRIM    60 tablet    Take 1 tablet (100 mg) by mouth daily    Acute idiopathic gout, unspecified site       aspirin 81 MG chewable tablet     36 tablet    1 tablet (81 mg) by Oral or Feeding Tube route daily    LVAD (left ventricular assist device) present (H)       BREO ELLIPTA 200-25 MCG/INH oral inhaler   Generic drug:  fluticasone-vilanterol      Inhale 1 puff into the lungs daily        bumetanide 1 MG tablet    BUMEX    180 tablet    Take 1 tablet (1 mg) by mouth 2 times daily    LVAD (left ventricular assist device) present (H)       celeXA 20 MG tablet   Generic drug:  citalopram      Take 20 mg by mouth daily        cyanocobalamin 1000 MCG/ML injection    VITAMIN B12     Inject 1,000 mcg into the muscle every 30 days        gabapentin 300 MG capsule    NEURONTIN    90 capsule    Take 1 capsule (300 mg) by mouth 3 times daily    Nerve pain, LVAD (left ventricular assist device) present (H), Acute idiopathic gout, unspecified site, Chronic systolic congestive heart failure (H)       INCRUSE ELLIPTA 62.5 MCG/INH oral inhaler   Generic drug:  umeclidinium      Inhale 1 puff into the lungs daily        ipratropium - albuterol 0.5 mg/2.5 mg/3 mL 0.5-2.5 (3) MG/3ML neb solution    DUONEB     Take 3 mLs by nebulization every 4 hours as needed for shortness of breath / dyspnea or wheezing        losartan 25 MG tablet    COZAAR    90 tablet    Take 1 tablet (25 mg) by mouth daily    LVAD (left ventricular assist device) present (H), Chronic systolic congestive heart failure (H)       metFORMIN 500 MG tablet    GLUCOPHAGE    60 tablet    Take 0.5 tablets (250 mg) by mouth 2 times daily (with meals)    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, without long-term current use of insulin, unspecified CKD stage (H)       metoprolol succinate 25 MG 24 hr tablet    TOPROL-XL    180 tablet    Take 1 tablet (25 mg) by mouth  2 times daily    Chronic systolic congestive heart failure (H), LVAD (left ventricular assist device) present (H)       pantoprazole 40 MG EC tablet    PROTONIX    60 tablet    Take 1 tablet (40 mg) by mouth every morning    Gastroesophageal reflux disease without esophagitis       polyethylene glycol Packet    MIRALAX/GLYCOLAX    7 packet    Take 17 g by mouth daily as needed for constipation (for no bm in 1 day)    Acute post-operative pain       potassium chloride 20 MEQ Packet    KLOR-CON    60 packet    Take 20 mEq by mouth daily    LVAD (left ventricular assist device) present (H), Chronic systolic congestive heart failure (H)       predniSONE 20 MG tablet    DELTASONE    20 tablet    Take 0.5 tablets (10 mg) by mouth daily    Acute idiopathic gout, unspecified site, LVAD (left ventricular assist device) present (H), Nerve pain, Chronic systolic congestive heart failure (H)       PROAIR  (90 Base) MCG/ACT Inhaler   Generic drug:  albuterol      Inhale 2 puffs into the lungs every 4 hours as needed for shortness of breath / dyspnea or wheezing        senna-docusate 8.6-50 MG per tablet    SENOKOT-S;PERICOLACE    100 tablet    Take 2 tablets by mouth 2 times daily as needed for constipation    Acute post-operative pain       SINGULAIR 10 MG tablet   Generic drug:  montelukast      Take 10 mg by mouth At Bedtime        traMADol 50 MG tablet    ULTRAM    28 tablet    Take 2 tablets (100 mg) by mouth every 6 hours as needed for moderate pain or breakthrough pain    Acute post-operative pain       * warfarin 3 MG tablet    COUMADIN    30 tablet    Use as directed.    LVAD (left ventricular assist device) present (H)       * warfarin 5 MG tablet    COUMADIN    75 tablet    Take 2-2.5 tablets daily or as directed by coumadin clinic.    LVAD (left ventricular assist device) present (H), Chronic systolic congestive heart failure (H)       zinc sulfate 220 (50 Zn) MG capsule    ZINCATE     Take 220 mg by mouth 2  times daily        * Notice:  This list has 2 medication(s) that are the same as other medications prescribed for you. Read the directions carefully, and ask your doctor or other care provider to review them with you.

## 2018-07-06 NOTE — PROGRESS NOTES
ANTICOAGULATION FOLLOW-UP CLINIC VISIT    Patient Name:  Jim Willingham  Date:  7/6/2018  Contact Type:  Telephone    SUBJECTIVE:     Patient Findings     Comments Jim had a right heart cath today, 7/6/18.           OBJECTIVE    INR   Date Value Ref Range Status   07/06/2018 2.56 (H) 0.86 - 1.14 Final       ASSESSMENT / PLAN  INR assessment THER    Recheck INR In: 10 DAYS    INR Location Clinic      Anticoagulation Summary as of 7/6/2018     INR goal 2.0-3.0   Today's INR 2.56   Warfarin maintenance plan 5 mg (5 mg x 1) on Fri; 7.5 mg (5 mg x 1.5) all other days   Full warfarin instructions 5 mg on Fri; 7.5 mg all other days   Weekly warfarin total 50 mg   No change documented Kirsty Bermudez RN   Plan last modified Maru Alonso RN (6/4/2018)   Next INR check 7/16/2018   Priority INR   Target end date     Indications   LVAD (left ventricular assist device) present (H) [Z95.811]  Long-term (current) use of anticoagulants [Z79.01] [Z79.01]         Anticoagulation Episode Summary     INR check location     Preferred lab     Send INR reminders to [facility]    Comments HIPPA Forms mailed 6/26/17  Labs drawn either at [facility] or [facility]      Anticoagulation Care Providers     Provider Role Specialty Phone number    Delisa Montgomery MD Responsible Cardiology [phone]            See the Encounter Report to view Anticoagulation Flowsheet and Dosing Calendar (Go to Encounters tab in chart review, and find the Anticoagulation Therapy Visit)    Left message for patient with results and dosing recommendations. Asked patient to call back to report any missed doses, falls, signs and symptoms of bleeding or clotting, any changes in health, medication, or diet. Asked patient to call back with any questions or concerns.     Kirsty Bermudez, RN

## 2018-07-09 NOTE — NURSING NOTE
1). PUMP DATA  Primary controller serial number: [id number]    HM II:   Flow: 5.8 L/min,    Speed: 9600 RPMs,     PI: 4.9 ,  Power: 6.4 Manuel,    Pt's speed decreased to 9400 RPMs, per Dr. Montgomery. Low speed limit set to 9000 RPMs. Pt's backup controller was all reprogrammed to reflect this.     Primary controller   Back up battery: Patient use: 52, Replace in: 17  Months     Data downloaded: No   Equipment and driveline assessed for damage: Yes     Back up : Serial number: [id number]  Back up battery: Patient use: 4 Replace in: 19  Months  Programmed settings identical to the settings on the primary controller :Yes      Education complete: Yes   Charge the BACKUP controller s backup battery every 6 months  Perform a self test on BACKUP every 6 months  Change the MPU s batteries every 6 months:Yes  Have equipment serviced yearly (if applicable):Yes    2). ALARMS  Alarms reported by patient since last pump evaluation: No  Alarms or other finding noted during pump data history and alarm download: Yes, pt has frequent PI events. There are no other alarms on his controller history.     Action Taken:  Reviewed data with patient: Yes      3). DRESSING CHANGE / DRIVELINE ASSESSMENT  Dressing change completed today: No  Appearance of Driveline site: Per pt and wife, his site is C/D/I, no redness, swelling, or tenderness.     Driveline stabilization: Method: [employer]  [ Teaching reinforced on need for stabilization of Driveline. ]

## 2018-07-10 DIAGNOSIS — Z95.811 LVAD (LEFT VENTRICULAR ASSIST DEVICE) PRESENT (H): Primary | ICD-10-CM

## 2018-07-10 DIAGNOSIS — I50.22 CHRONIC SYSTOLIC CONGESTIVE HEART FAILURE (H): ICD-10-CM

## 2018-07-10 RX ORDER — ACETAMINOPHEN 325 MG/1
650 TABLET ORAL
Status: CANCELLED
Start: 2018-07-10

## 2018-07-10 RX ORDER — DIPHENHYDRAMINE HCL 25 MG
25 CAPSULE ORAL
Status: CANCELLED | OUTPATIENT
Start: 2018-07-10

## 2018-07-13 ENCOUNTER — ANTICOAGULATION THERAPY VISIT (OUTPATIENT)
Dept: ANTICOAGULATION | Facility: CLINIC | Age: 63
End: 2018-07-13

## 2018-07-13 DIAGNOSIS — Z95.811 LVAD (LEFT VENTRICULAR ASSIST DEVICE) PRESENT (H): ICD-10-CM

## 2018-07-13 DIAGNOSIS — Z79.01 LONG-TERM (CURRENT) USE OF ANTICOAGULANTS: ICD-10-CM

## 2018-07-13 DIAGNOSIS — I50.22 CHRONIC SYSTOLIC CONGESTIVE HEART FAILURE (H): ICD-10-CM

## 2018-07-13 LAB
ANION GAP SERPL CALCULATED.3IONS-SCNC: 7 MMOL/L (ref 3–14)
BUN SERPL-MCNC: 41 MG/DL (ref 7–30)
CALCIUM SERPL-MCNC: 8.6 MG/DL (ref 8.5–10.1)
CHLORIDE SERPL-SCNC: 101 MMOL/L (ref 94–109)
CO2 SERPL-SCNC: 28 MMOL/L (ref 20–32)
CREAT SERPL-MCNC: 1.3 MG/DL (ref 0.66–1.25)
GFR SERPL CREATININE-BSD FRML MDRD: 56 ML/MIN/1.7M2
GLUCOSE SERPL-MCNC: 287 MG/DL (ref 70–99)
INR PPP: 2.45 (ref 0.86–1.14)
POTASSIUM SERPL-SCNC: 4.4 MMOL/L (ref 3.4–5.3)
SODIUM SERPL-SCNC: 136 MMOL/L (ref 133–144)

## 2018-07-13 PROCEDURE — 85610 PROTHROMBIN TIME: CPT | Performed by: INTERNAL MEDICINE

## 2018-07-13 PROCEDURE — 80048 BASIC METABOLIC PNL TOTAL CA: CPT | Performed by: INTERNAL MEDICINE

## 2018-07-13 PROCEDURE — 36415 COLL VENOUS BLD VENIPUNCTURE: CPT | Performed by: INTERNAL MEDICINE

## 2018-07-13 NOTE — MR AVS SNAPSHOT
Jim Willingham   7/13/2018   Anticoagulation Therapy Visit    Description:  63 year old male   Provider:  Delisa Hillman, RN   Department:  [facility]           INR as of 7/13/2018     Today's INR 2.45      Anticoagulation Summary as of 7/13/2018     INR goal 2.0-3.0   Today's INR 2.45   Full warfarin instructions 5 mg on Fri; 7.5 mg all other days   Next INR check 7/27/2018    Indications   LVAD (left ventricular assist device) present (H) [Z95.811]  Long-term (current) use of anticoagulants [Z79.01] [Z79.01]         July 2018 Details    Sun Mon Tue Wed Thu Fri Sat     1               2               3               4               5               6               7                 8               9               10               11               12               13      5 mg   See details      14      7.5 mg           15      7.5 mg         16      7.5 mg         17      7.5 mg         18      7.5 mg         19      7.5 mg         20      5 mg         21      7.5 mg           22      7.5 mg         23      7.5 mg         24      7.5 mg         25      7.5 mg         26      7.5 mg         27            28                 29               30               31                    Date Details   07/13 This INR check       Date of next INR:  7/27/2018         How to take your warfarin dose     To take:  5 mg Take 1 of the 5 mg tablets.    To take:  7.5 mg Take 1.5 of the 5 mg tablets.

## 2018-07-13 NOTE — PROGRESS NOTES
ANTICOAGULATION FOLLOW-UP CLINIC VISIT    Patient Name:  Jim Willingham  Date:  7/13/2018  Contact Type:  Telephone    SUBJECTIVE:     Patient Findings     Positives Change in medications (7/6-Restarted aspirin 81mg daily.)           OBJECTIVE    INR   Date Value Ref Range Status   07/13/2018 2.45 (H) 0.86 - 1.14 Final       ASSESSMENT / PLAN  INR assessment THER    Recheck INR In: 2 WEEKS    INR Location Clinic      Anticoagulation Summary as of 7/13/2018     INR goal 2.0-3.0   Today's INR 2.45   Warfarin maintenance plan 5 mg (5 mg x 1) on Fri; 7.5 mg (5 mg x 1.5) all other days   Full warfarin instructions 5 mg on Fri; 7.5 mg all other days   Weekly warfarin total 50 mg   Plan last modified Maru Alonso RN (6/4/2018)   Next INR check 7/27/2018   Priority INR   Target end date     Indications   LVAD (left ventricular assist device) present (H) [Z95.811]  Long-term (current) use of anticoagulants [Z79.01] [Z79.01]         Anticoagulation Episode Summary     INR check location     Preferred lab     Send INR reminders to [facility]    Comments HIPPA Forms mailed 6/26/17  Labs drawn either at [facility] or [facility]      Anticoagulation Care Providers     Provider Role Specialty Phone number    Delisa Montgomery MD Responsible Cardiology [phone]            See the Encounter Report to view Anticoagulation Flowsheet and Dosing Calendar (Go to Encounters tab in chart review, and find the Anticoagulation Therapy Visit)  Spoke with patient.    Delisa Hillman RN

## 2018-07-17 ENCOUNTER — CARE COORDINATION (OUTPATIENT)
Dept: CARDIOLOGY | Facility: CLINIC | Age: 63
End: 2018-07-17

## 2018-07-17 DIAGNOSIS — I50.22 CHRONIC SYSTOLIC CONGESTIVE HEART FAILURE (H): ICD-10-CM

## 2018-07-17 DIAGNOSIS — Z95.811 LVAD (LEFT VENTRICULAR ASSIST DEVICE) PRESENT (H): Primary | ICD-10-CM

## 2018-07-17 NOTE — PROGRESS NOTES
Pt had labs drawn on 7/13. Called pt that day to review. Pt reported no changes in weight. Still feels tired. Has not scheduled iron infusions yet. Gave pt the phone number for the [facility] infusion center and the [place] infusion center.   Discussed labs and weight with Dr. Montgomery. MD advised that pt should increase bumex to 2mg BID for 4 days, then resume 2mg in the am and 1mg in the pm, and have labs checked in 2 weeks. Left voicemail for pt with these instructions and requested a call back.

## 2018-07-24 DIAGNOSIS — Z95.811 LVAD (LEFT VENTRICULAR ASSIST DEVICE) PRESENT (H): ICD-10-CM

## 2018-07-24 RX ORDER — BUMETANIDE 1 MG/1
2 TABLET ORAL 2 TIMES DAILY
Qty: 120 TABLET | Refills: 11 | Status: SHIPPED | OUTPATIENT
Start: 2018-07-24 | End: 2019-07-24

## 2018-07-27 ENCOUNTER — ANTICOAGULATION THERAPY VISIT (OUTPATIENT)
Dept: ANTICOAGULATION | Facility: CLINIC | Age: 63
End: 2018-07-27

## 2018-07-27 DIAGNOSIS — Z79.01 LONG-TERM (CURRENT) USE OF ANTICOAGULANTS: ICD-10-CM

## 2018-07-27 DIAGNOSIS — Z95.811 LVAD (LEFT VENTRICULAR ASSIST DEVICE) PRESENT (H): ICD-10-CM

## 2018-07-27 LAB — INR PPP: 2.07 (ref 0.86–1.14)

## 2018-07-27 PROCEDURE — 36415 COLL VENOUS BLD VENIPUNCTURE: CPT | Performed by: INTERNAL MEDICINE

## 2018-07-27 PROCEDURE — 85610 PROTHROMBIN TIME: CPT | Performed by: INTERNAL MEDICINE

## 2018-07-27 NOTE — MR AVS SNAPSHOT
Jim Willingham   7/27/2018   Anticoagulation Therapy Visit    Description:  63 year old male   Provider:  Kirsty Bermudez, RN   Department:  [facility]           INR as of 7/27/2018     Today's INR 2.07      Anticoagulation Summary as of 7/27/2018     INR goal 2.0-3.0   Today's INR 2.07   Full warfarin instructions 5 mg on Fri; 7.5 mg all other days   Next INR check 8/6/2018    Indications   LVAD (left ventricular assist device) present (H) [Z95.811]  Long-term (current) use of anticoagulants [Z79.01] [Z79.01]         July 2018 Details    Sun Mon Tue Wed Thu Fri Sat     1               2               3               4               5               6               7                 8               9               10               11               12               13               14                 15               16               17               18               19               20               21                 22               23               24               25               26               27      5 mg   See details      28      7.5 mg           29      7.5 mg         30      7.5 mg         31      7.5 mg              Date Details   07/27 This INR check               How to take your warfarin dose     To take:  5 mg Take 1 of the 5 mg tablets.    To take:  7.5 mg Take 1.5 of the 5 mg tablets.           August 2018 Details    Sun Mon Tue Wed Thu Fri Sat        1      7.5 mg         2      7.5 mg         3      5 mg         4      7.5 mg           5      7.5 mg         6            7               8               9               10               11                 12               13               14               15               16               17               18                 19               20               21               22               23               24               25                 26               27               28               29               30               31                  Date Details   No additional details    Date of next INR:  8/6/2018         How to take your warfarin dose     To take:  5 mg Take 1 of the 5 mg tablets.    To take:  7.5 mg Take 1.5 of the 5 mg tablets.

## 2018-07-27 NOTE — PROGRESS NOTES
ANTICOAGULATION FOLLOW-UP CLINIC VISIT    Patient Name:  Jim Willingham  Date:  7/27/2018  Contact Type:  Telephone    SUBJECTIVE:     Patient Findings     Positives No Problem Findings           OBJECTIVE    INR   Date Value Ref Range Status   07/27/2018 2.07 (H) 0.86 - 1.14 Final       ASSESSMENT / PLAN  INR assessment THER    Recheck INR In: 10 DAYS    INR Location Clinic      Anticoagulation Summary as of 7/27/2018     INR goal 2.0-3.0   Today's INR 2.07   Warfarin maintenance plan 5 mg (5 mg x 1) on Fri; 7.5 mg (5 mg x 1.5) all other days   Full warfarin instructions 5 mg on Fri; 7.5 mg all other days   Weekly warfarin total 50 mg   No change documented Kirsty Bermudez RN   Plan last modified Maru Alonso RN (6/4/2018)   Next INR check 8/6/2018   Priority INR   Target end date     Indications   LVAD (left ventricular assist device) present (H) [Z95.811]  Long-term (current) use of anticoagulants [Z79.01] [Z79.01]         Anticoagulation Episode Summary     INR check location     Preferred lab     Send INR reminders to [facility]    Comments HIPPA Forms mailed 6/26/17  Labs drawn either at [facility] or [facility] [place]      Anticoagulation Care Providers     Provider Role Specialty Phone number    Delisa Montgomery MD Responsible Cardiology [phone]            See the Encounter Report to view Anticoagulation Flowsheet and Dosing Calendar (Go to Encounters tab in chart review, and find the Anticoagulation Therapy Visit)    Spoke with Jim.  He has an appt at the [facility] on 8/6/18 and will have INR checked at that time.    Kirsty Bermudez, REGINALD

## 2018-07-31 DIAGNOSIS — D50.9 IRON DEFICIENCY ANEMIA: ICD-10-CM

## 2018-08-01 ENCOUNTER — INFUSION THERAPY VISIT (OUTPATIENT)
Dept: INFUSION THERAPY | Facility: CLINIC | Age: 63
End: 2018-08-01
Payer: COMMERCIAL

## 2018-08-01 VITALS
BODY MASS INDEX: 41.97 KG/M2 | RESPIRATION RATE: 20 BRPM | TEMPERATURE: 97.3 F | HEART RATE: 67 BPM | OXYGEN SATURATION: 98 % | WEIGHT: 276 LBS

## 2018-08-01 DIAGNOSIS — I50.22 CHRONIC SYSTOLIC CONGESTIVE HEART FAILURE (H): ICD-10-CM

## 2018-08-01 DIAGNOSIS — D50.9 IRON DEFICIENCY ANEMIA: Primary | ICD-10-CM

## 2018-08-01 PROCEDURE — 96365 THER/PROPH/DIAG IV INF INIT: CPT | Performed by: INTERNAL MEDICINE

## 2018-08-01 PROCEDURE — 99207 ZZC NO CHARGE LOS: CPT

## 2018-08-01 RX ORDER — ACETAMINOPHEN 325 MG/1
650 TABLET ORAL
Status: CANCELLED
Start: 2018-08-01

## 2018-08-01 RX ORDER — DIPHENHYDRAMINE HCL 25 MG
25 CAPSULE ORAL
Status: CANCELLED | OUTPATIENT
Start: 2018-08-01

## 2018-08-01 RX ADMIN — Medication 250 ML: at 14:53

## 2018-08-01 ASSESSMENT — PAIN SCALES - GENERAL: PAINLEVEL: NO PAIN (0)

## 2018-08-01 NOTE — MR AVS SNAPSHOT
After Visit Summary   8/1/2018    Jim Willingham    MRN: [id number]           Patient Information     Date Of Birth          1955        Visit Information        Provider Department      8/1/2018 2:00 PM BAY 4 INFUSION [facility]        Today's Diagnoses     Iron deficiency anemia    -  1    Chronic systolic congestive heart failure (H)           Follow-ups after your visit        Your next 10 appointments already scheduled     Aug 06, 2018  1:30 PM CDT   Lab with UC LAB   [facility] ([facility])    [address]  [address]st [facility] [address] [phone]            Aug 06, 2018  2:00 PM CDT   (Arrive by 1:45 PM)   Ventricular Assist Device with Cate Marshall NP   [facility] ([facility])    [address]  [address] [place] [address] [phone]            Aug 10, 2018  9:00 AM CDT   Level 1 with BAY 2 INFUSION   [facility] ([facility])    [address] [facility] [address]   [phone]            Aug 16, 2018 12:00 PM CDT   Level 1 with BAY 10 INFUSION   [facility] ([facility])    [address] [facility] [address]   [phone]            Aug 22, 2018  8:00 AM CDT   Level 1 with BAY 1 INFUSION   [facility] ([facility])    [address] [facility] [address]   [phone]            Aug 28, 2018  8:00 AM CDT   Level 1 with BAY 1 INFUSION   [facility] ([facility])    [address] [facility] [address]   [phone]            Dec 07, 2018  9:30 AM CST   Lab with UC LAB   [facility] ([facility])    [address]  1st Floor  [facility] [address] [phone]            Dec 07, 2018 10:00 AM CST   (Arrive by 9:45 AM)   Implanted Defibulator with Uc Cv  Device 1   [facility] ([facility])    [address]  [address]  [facility] [place] [facility] [address] [phone]            Dec 07, 2018 10:30 AM CST   (Arrive by 10:15 AM)   Ventricular Assist Device with Delisa Montgomery MD   [facility] ([facility])    [address]  [address]  [place] [address] [phone]              Who to contact     If you have questions or need follow up information about today's clinic visit or your schedule please contact [facility] directly at [phone].  Normal or non-critical lab and imaging results will be communicated to you by MyChart, letter or phone within 4 business days after the clinic has received the results. If you do not hear from us within 7 days, please contact the clinic through MyChart or phone. If you have a critical or abnormal lab result, we will notify you by phone as soon as possible.  Submit refill requests through [employer] or call your pharmacy and they will forward the refill request to us. Please allow 3 business days for your refill to be completed.          Additional Information About Your Visit        Care EveryWhere ID     This is your Care EveryWhere ID. This could be used by other organizations to access your [facility] medical records  [id number]        Your Vitals Were     Pulse Temperature Respirations Pulse Oximetry BMI (Body Mass Index)       67 97.3  F (36.3  C) (Oral) 20 98% 41.97 kg/m2        Blood Pressure from Last 3 Encounters:   07/06/18 (!) 82/0   07/06/18 (!) 108/94   05/04/18 (!) 76/0    Weight from Last 3 Encounters:   08/01/18 125.2 kg (276 lb)   07/06/18 124.1 kg (273 lb 11.2 oz)   07/06/18 117.9 kg (260 lb)              Today, you had the following     No orders found for display       Primary Care Provider Office Phone # Fax #    Jovany Salas [phone]       [address]  [address]  [facility] [address]        Equal Access to Services     JERROD VELASQUEZ : Hadii andrea chopra gegecarli Sobrandon, waaxda luqadaha, qaybta jonathanorlandoroberta larson, kendra mitchellroselynlinda ely. So [facility] [phone].    ATENCIÓN: Si habla español, tiene a roger disposición servicios gratuitos de asistencia lingüística. Llame al [phone].    We comply with applicable federal civil rights laws and [place] laws. We do not discriminate on the basis of race, color, national origin, age, disability, sex, sexual orientation, or gender identity.            Thank you!     Thank you for choosing [facility]  for your care. Our goal is always to provide you with excellent care. Hearing back from our patients is one way we can continue to improve our services. Please take a few minutes to complete the written survey that you may receive in the mail after your visit with us. Thank you!             Your Updated Medication List - Protect others around you: Learn how to safely use, store and throw away your medicines at www.disposemymeds.org.          This list is accurate as of 8/1/18 11:59 PM.  Always use your most recent med list.                   Brand Name Dispense Instructions for use Diagnosis    acetaminophen 325 MG tablet    TYLENOL    100 tablet    Take 2 tablets (650 mg) by mouth every 6 hours    Acute post-operative pain       ALDACTONE 25 MG tablet   Generic drug:  spironolactone     30 tablet    Take 1 tablet (25 mg) by mouth daily    LVAD (left ventricular assist device) present (H), Chronic systolic congestive heart failure (H), Nerve pain, Acute idiopathic gout, unspecified site       allopurinol 100 MG tablet    ZYLOPRIM    60 tablet    Take 1 tablet (100 mg) by mouth daily    Acute idiopathic gout, unspecified site       aspirin 81 MG chewable tablet     36 tablet    1 tablet (81 mg) by Oral or Feeding Tube route daily    LVAD (left ventricular assist device) present (H)       BREO ELLIPTA  200-25 MCG/INH oral inhaler   Generic drug:  fluticasone-vilanterol      Inhale 1 puff into the lungs daily        bumetanide 1 MG tablet    BUMEX    120 tablet    Take 2 tablets (2 mg) by mouth 2 times daily    LVAD (left ventricular assist device) present (H)       celeXA 20 MG tablet   Generic drug:  citalopram      Take 20 mg by mouth daily        cyanocobalamin 1000 MCG/ML injection    VITAMIN B12     Inject 1,000 mcg into the muscle every 30 days        gabapentin 300 MG capsule    NEURONTIN    90 capsule    Take 1 capsule (300 mg) by mouth 3 times daily    Nerve pain, LVAD (left ventricular assist device) present (H), Acute idiopathic gout, unspecified site, Chronic systolic congestive heart failure (H)       INCRUSE ELLIPTA 62.5 MCG/INH oral inhaler   Generic drug:  umeclidinium      Inhale 1 puff into the lungs daily        ipratropium - albuterol 0.5 mg/2.5 mg/3 mL 0.5-2.5 (3) MG/3ML neb solution    DUONEB     Take 3 mLs by nebulization every 4 hours as needed for shortness of breath / dyspnea or wheezing        losartan 25 MG tablet    COZAAR    90 tablet    Take 1 tablet (25 mg) by mouth daily    LVAD (left ventricular assist device) present (H), Chronic systolic congestive heart failure (H)       metFORMIN 500 MG tablet    GLUCOPHAGE    60 tablet    Take 0.5 tablets (250 mg) by mouth 2 times daily (with meals)    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, without long-term current use of insulin, unspecified CKD stage (H)       metoprolol succinate 25 MG 24 hr tablet    TOPROL-XL    180 tablet    Take 1 tablet (25 mg) by mouth 2 times daily    Chronic systolic congestive heart failure (H), LVAD (left ventricular assist device) present (H)       pantoprazole 40 MG EC tablet    PROTONIX    60 tablet    Take 1 tablet (40 mg) by mouth every morning    Gastroesophageal reflux disease without esophagitis       polyethylene glycol Packet    MIRALAX/GLYCOLAX    7 packet    Take  17 g by mouth daily as needed for constipation (for no bm in 1 day)    Acute post-operative pain       potassium chloride 20 MEQ Packet    KLOR-CON    60 packet    Take 20 mEq by mouth daily    LVAD (left ventricular assist device) present (H), Chronic systolic congestive heart failure (H)       predniSONE 20 MG tablet    DELTASONE    20 tablet    Take 0.5 tablets (10 mg) by mouth daily    Acute idiopathic gout, unspecified site, LVAD (left ventricular assist device) present (H), Nerve pain, Chronic systolic congestive heart failure (H)       PROAIR  (90 Base) MCG/ACT Inhaler   Generic drug:  albuterol      Inhale 2 puffs into the lungs every 4 hours as needed for shortness of breath / dyspnea or wheezing        senna-docusate 8.6-50 MG per tablet    SENOKOT-S;PERICOLACE    100 tablet    Take 2 tablets by mouth 2 times daily as needed for constipation    Acute post-operative pain       SINGULAIR 10 MG tablet   Generic drug:  montelukast      Take 10 mg by mouth At Bedtime        traMADol 50 MG tablet    ULTRAM    28 tablet    Take 2 tablets (100 mg) by mouth every 6 hours as needed for moderate pain or breakthrough pain    Acute post-operative pain       * warfarin 3 MG tablet    COUMADIN    30 tablet    Use as directed.    LVAD (left ventricular assist device) present (H)       * warfarin 5 MG tablet    COUMADIN    75 tablet    Take 2-2.5 tablets daily or as directed by coumadin clinic.    LVAD (left ventricular assist device) present (H), Chronic systolic congestive heart failure (H)       zinc sulfate 220 (50 Zn) MG capsule    ZINCATE     Take 220 mg by mouth 2 times daily        * Notice:  This list has 2 medication(s) that are the same as other medications prescribed for you. Read the directions carefully, and ask your doctor or other care provider to review them with you.

## 2018-08-01 NOTE — PROGRESS NOTES
Infusion Nursing Note:  Jim Willingham presents today for Venofer.    Patient seen by provider today: No   present during visit today: Not Applicable.    Note: no BP measurable, as patient has LVAD.  P.I. Measure was normal/baseline for patient.    Intravenous Access:  Peripheral IV placed.    Treatment Conditions:  Not Applicable.    Post Infusion Assessment:  Patient tolerated infusion without incident.    Discharge Plan:   Pt sent to scheduling to schedule f/u infusion doses .    GENE ALMEIDA RN

## 2018-08-03 ENCOUNTER — ANTICOAGULATION THERAPY VISIT (OUTPATIENT)
Dept: ANTICOAGULATION | Facility: CLINIC | Age: 63
End: 2018-08-03

## 2018-08-03 DIAGNOSIS — Z95.811 LVAD (LEFT VENTRICULAR ASSIST DEVICE) PRESENT (H): ICD-10-CM

## 2018-08-03 DIAGNOSIS — I50.22 CHRONIC SYSTOLIC CONGESTIVE HEART FAILURE (H): ICD-10-CM

## 2018-08-03 DIAGNOSIS — Z79.01 LONG-TERM (CURRENT) USE OF ANTICOAGULANTS: ICD-10-CM

## 2018-08-03 LAB
ANION GAP SERPL CALCULATED.3IONS-SCNC: 6 MMOL/L (ref 3–14)
BUN SERPL-MCNC: 36 MG/DL (ref 7–30)
CALCIUM SERPL-MCNC: 8.6 MG/DL (ref 8.5–10.1)
CHLORIDE SERPL-SCNC: 102 MMOL/L (ref 94–109)
CO2 SERPL-SCNC: 29 MMOL/L (ref 20–32)
CREAT SERPL-MCNC: 1.58 MG/DL (ref 0.66–1.25)
GFR SERPL CREATININE-BSD FRML MDRD: 45 ML/MIN/1.7M2
GLUCOSE SERPL-MCNC: 78 MG/DL (ref 70–99)
INR PPP: 2.25 (ref 0.86–1.14)
POTASSIUM SERPL-SCNC: 4.4 MMOL/L (ref 3.4–5.3)
SODIUM SERPL-SCNC: 137 MMOL/L (ref 133–144)

## 2018-08-03 PROCEDURE — 80048 BASIC METABOLIC PNL TOTAL CA: CPT | Performed by: INTERNAL MEDICINE

## 2018-08-03 PROCEDURE — 36415 COLL VENOUS BLD VENIPUNCTURE: CPT | Performed by: INTERNAL MEDICINE

## 2018-08-03 PROCEDURE — 85610 PROTHROMBIN TIME: CPT | Performed by: INTERNAL MEDICINE

## 2018-08-03 NOTE — PROGRESS NOTES
ANTICOAGULATION FOLLOW-UP CLINIC VISIT    Patient Name:  Jim Willingham  Date:  8/3/2018  Contact Type:  Telephone    SUBJECTIVE:     Patient Findings     Positives No Problem Findings           OBJECTIVE    INR   Date Value Ref Range Status   08/03/2018 2.25 (H) 0.86 - 1.14 Final       ASSESSMENT / PLAN  INR assessment THER    Recheck INR In: 3 DAYS    INR Location Clinic      Anticoagulation Summary as of 8/3/2018     INR goal 2.0-3.0   Today's INR 2.25   Warfarin maintenance plan 5 mg (5 mg x 1) on Fri; 7.5 mg (5 mg x 1.5) all other days   Full warfarin instructions 5 mg on Fri; 7.5 mg all other days   Weekly warfarin total 50 mg   Plan last modified Maru Alonso RN (6/4/2018)   Next INR check 8/6/2018   Priority INR   Target end date     Indications   LVAD (left ventricular assist device) present (H) [Z95.811]  Long-term (current) use of anticoagulants [Z79.01] [Z79.01]         Anticoagulation Episode Summary     INR check location     Preferred lab     Send INR reminders to [facility]    Comments HIPPA Forms mailed 6/26/17  Labs drawn either at [facility] or [facility]      Anticoagulation Care Providers     Provider Role Specialty Phone number    Delisa Montgomery MD Responsible Cardiology [phone]            See the Encounter Report to view Anticoagulation Flowsheet and Dosing Calendar (Go to Encounters tab in chart review, and find the Anticoagulation Therapy Visit)    Spoke with patient. Gave them their lab results and new warfarin recommendation.  No changes in health, medication, or diet. No missed doses, no falls. No signs or symptoms of bleed or clotting.      Maged Bedolla [facility]

## 2018-08-03 NOTE — MR AVS SNAPSHOT
Jim Willingham   8/3/2018   Anticoagulation Therapy Visit    Description:  63 year old male   Provider:  Maged Bedolla, [facility]   Department:  [facility]           INR as of 8/3/2018     Today's INR 2.25      Anticoagulation Summary as of 8/3/2018     INR goal 2.0-3.0   Today's INR 2.25   Full warfarin instructions 5 mg on Fri; 7.5 mg all other days   Next INR check 8/6/2018    Indications   LVAD (left ventricular assist device) present (H) [Z95.811]  Long-term (current) use of anticoagulants [Z79.01] [Z79.01]         August 2018 Details    Sun Mon Tue Wed Thu Fri Sat        1               2               3      5 mg   See details      4      7.5 mg           5      7.5 mg         6            7               8               9               10               11                 12               13               14               15               16               17               18                 19               20               21               22               23               24               25                 26               27               28               29               30               31                 Date Details   08/03 This INR check       Date of next INR:  8/6/2018         How to take your warfarin dose     To take:  5 mg Take 1 of the 5 mg tablets.    To take:  7.5 mg Take 1.5 of the 5 mg tablets.

## 2018-08-06 ENCOUNTER — ANTICOAGULATION THERAPY VISIT (OUTPATIENT)
Dept: ANTICOAGULATION | Facility: CLINIC | Age: 63
End: 2018-08-06

## 2018-08-06 ENCOUNTER — OFFICE VISIT (OUTPATIENT)
Dept: CARDIOLOGY | Facility: CLINIC | Age: 63
End: 2018-08-06
Attending: NURSE PRACTITIONER
Payer: COMMERCIAL

## 2018-08-06 VITALS
OXYGEN SATURATION: 97 % | HEART RATE: 84 BPM | SYSTOLIC BLOOD PRESSURE: 88 MMHG | HEIGHT: 68 IN | WEIGHT: 271.8 LBS | BODY MASS INDEX: 41.19 KG/M2

## 2018-08-06 DIAGNOSIS — Z79.01 LONG-TERM (CURRENT) USE OF ANTICOAGULANTS: ICD-10-CM

## 2018-08-06 DIAGNOSIS — Z95.811 LVAD (LEFT VENTRICULAR ASSIST DEVICE) PRESENT (H): ICD-10-CM

## 2018-08-06 DIAGNOSIS — I50.23 ACUTE ON CHRONIC SYSTOLIC CONGESTIVE HEART FAILURE (H): Primary | ICD-10-CM

## 2018-08-06 DIAGNOSIS — N18.30 CKD (CHRONIC KIDNEY DISEASE) STAGE 3, GFR 30-59 ML/MIN (H): ICD-10-CM

## 2018-08-06 DIAGNOSIS — E66.01 MORBID OBESITY (H): ICD-10-CM

## 2018-08-06 DIAGNOSIS — I10 BENIGN ESSENTIAL HYPERTENSION: ICD-10-CM

## 2018-08-06 LAB — INR PPP: 1.89 (ref 0.86–1.14)

## 2018-08-06 PROCEDURE — 99214 OFFICE O/P EST MOD 30 MIN: CPT | Mod: 25 | Performed by: NURSE PRACTITIONER

## 2018-08-06 PROCEDURE — 36415 COLL VENOUS BLD VENIPUNCTURE: CPT | Performed by: INTERNAL MEDICINE

## 2018-08-06 PROCEDURE — G0463 HOSPITAL OUTPT CLINIC VISIT: HCPCS | Mod: 25,ZF

## 2018-08-06 PROCEDURE — 85610 PROTHROMBIN TIME: CPT | Performed by: INTERNAL MEDICINE

## 2018-08-06 PROCEDURE — 93750 INTERROGATION VAD IN PERSON: CPT | Mod: ZF | Performed by: NURSE PRACTITIONER

## 2018-08-06 RX ORDER — LOSARTAN POTASSIUM 25 MG/1
37.5 TABLET ORAL DAILY
Qty: 90 TABLET | Refills: 3 | COMMUNITY
Start: 2018-08-06 | End: 2018-11-20

## 2018-08-06 ASSESSMENT — PAIN SCALES - GENERAL: PAINLEVEL: NO PAIN (0)

## 2018-08-06 NOTE — NURSING NOTE
1). PUMP DATA  Primary controller serial number: [id number]     II:   Flow: 6.3 L/min,    Speed: 9400 RPMs,     PI: 5.7 ,  Power: 6.5 Manuel,      Primary controller   Back up battery: Patient use: 52, Replace in: 16  Months     Data downloaded: No   Equipment and driveline assessed for damage: Yes     Back up : Serial number: [id number]  Back up battery: Patient use: 4 Replace in: 18  Months  Programmed settings identical to the settings on the primary controller :Yes      Education complete: Yes   Charge the BACKUP controller s backup battery every 6 months  Perform a self test on BACKUP every 6 months  Change the MPU s batteries every 6 months:Yes  Have equipment serviced yearly (if applicable):Yes    2). ALARMS  Alarms reported by patient since last pump evaluation: No  Alarms or other finding noted during pump data history and alarm download: Yes - some PI events, no significant speed drops.     Action Taken:  Reviewed data with patient: Yes      3). DRESSING CHANGE / DRIVELINE ASSESSMENT  Dressing change completed today: No  Appearance of Driveline site: c/d/i per pt report. Weekly dressing intact    Driveline stabilization: Method: [employer]  [ Teaching reinforced on need for stabilization of Driveline. ]    4).Pt. Education    D:  Pt education provided.  I:  Pt educated on the following topics:  1. When to call 911.  Reviewed emergency situations.  2. What to do if my VAD Coordinator is not returning my call.  3. When to page the VAD Coordinator on Call (handout provided w/ frequent symptoms to report).  4. Pt practice paged the VAD Coordinator on Call.   5. Pt given page of stickers with the number for the VAD Coordinator on Call.  6. Pt given list of frequently used resources and their numbers.  Reviewed when to call each resource.  (Social Work, Financial Counselor, Coumadin Clinic, Device Nurse, ).  A:  Pt verbalized understanding.  P:  VAD Coordinator available for questions or  concerns.  Will continue VAD education.

## 2018-08-06 NOTE — PROGRESS NOTES
ANTICOAGULATION FOLLOW-UP CLINIC VISIT    Patient Name:  Jim Willingham  Date:  8/6/2018  Contact Type:  Telephone    SUBJECTIVE:     Patient Findings     Comments Recommend  mg daily until INR is therapeutic.             OBJECTIVE    INR   Date Value Ref Range Status   08/06/2018 1.89 (H) 0.86 - 1.14 Final       ASSESSMENT / PLAN  INR assessment SUB    Recheck INR In: 2 DAYS    INR Location Clinic      Anticoagulation Summary as of 8/6/2018     INR goal 2.0-3.0   Today's INR 1.89!   Warfarin maintenance plan 5 mg (5 mg x 1) on Fri; 7.5 mg (5 mg x 1.5) all other days   Full warfarin instructions 8/6: 10 mg; Otherwise 5 mg on Fri; 7.5 mg all other days   Weekly warfarin total 50 mg   Plan last modified Maru Alonso RN (6/4/2018)   Next INR check 8/8/2018   Priority INR   Target end date     Indications   LVAD (left ventricular assist device) present (H) [Z95.811]  Long-term (current) use of anticoagulants [Z79.01] [Z79.01]         Anticoagulation Episode Summary     INR check location     Preferred lab     Send INR reminders to [facility]    Comments HIPPA Forms mailed 6/26/17  Labs drawn either at [facility] or [facility]      Anticoagulation Care Providers     Provider Role Specialty Phone number    Delisa Montgomery MD Responsible Cardiology [phone]            See the Encounter Report to view Anticoagulation Flowsheet and Dosing Calendar (Go to Encounters tab in chart review, and find the Anticoagulation Therapy Visit)    Left message for patient with results and dosing recommendations. Asked patient to call back to report any missed doses, falls, signs and symptoms of bleeding or clotting, any changes in health, medication, or diet. Asked patient to call back with any questions or concerns.      Kirsty Bermudez, RN

## 2018-08-06 NOTE — MR AVS SNAPSHOT
After Visit Summary   8/6/2018    Jim Willingham    MRN: [id number]           Patient Information     Date Of Birth          1955        Visit Information        Provider Department      8/6/2018 2:00 PM Cate Marshall NP [facility]        Today's Diagnoses     LVAD (left ventricular assist device) present (H)        Chronic systolic congestive heart failure (H)          Care Instructions    Medications:  1. INCREASE Bumex to 2mg twice daily  2. INCREASE Losartan to 37.5mg daily    Follow-up:  1. Please schedule an appt with an NP in October    Instructions:  1. Continue working on your weight - it is hard work!!  2. Get labs drawn in one week - we want to keep an eye on your kidney function    Page the VAD Coordinator on call if you gain more than 3 lb in a day or 5 in a week. Please also page if you feel unwell or have alarms.     Great to see you in clinic today. To Page the VAD Coordinator on call, dial [phone] option #[phone] and ask to speak to the VAD coordinator on call.               Follow-ups after your visit        Your next 10 appointments already scheduled     Aug 10, 2018  9:00 AM CDT   Level 1 with BAY 2 INFUSION   [facility])    [address] [facility] [address]   [phone]            Aug 16, 2018 12:00 PM CDT   Level 1 with BAY 10 INFUSION   [facility])    [address] [facility] [address]   [phone]            Aug 22, 2018  8:00 AM CDT   Level 1 with BAY 1 INFUSION   [facility])    [address] [facility] [address]   [phone]            Aug 28, 2018  8:00 AM CDT   Level 1 with BAY 1 INFUSION   [facility])    [address] [facility] [address]   [phone]            Dec 07, 2018  9:30 AM CST   Lab with  LAB   [facility]    [address]  1st Floor  [facility] [address]   [phone]            Dec 07, 2018 10:00 AM CST   (Arrive by 9:45 AM)   Implanted Defibulator with Uc Cv Device 1   [facility])    [address]  [address]  [facility] [address] [phone]            Dec 07, 2018 10:30 AM CST   (Arrive by 10:15 AM)   Ventricular Assist Device with Delisa Montgomery MD   [facility] ([facility])    [address]  [address]  [facility] [id number] [phone]              Future tests that were ordered for you today     Open Future Orders        Priority Expected Expires Ordered    Basic metabolic panel Routine 8/13/2018 8/6/2019 8/6/2018            Who to contact     If you have questions or need follow up information about today's clinic visit or your schedule please contact [facility] directly at [phone].  Normal or non-critical lab and imaging results will be communicated to you by MyChart, letter or phone within 4 business days after the clinic has received the results. If you do not hear from us within 7 days, please contact the clinic through MyChart or phone. If you have a critical or abnormal lab result, we will notify you by phone as soon as possible.  Submit refill requests through [employer] or call your pharmacy and they will forward the refill request to us. Please allow 3 business days for your refill to be completed.          Additional Information About Your Visit        Care EveryWhere ID     This is your Care EveryWhere ID. This could be used by other organizations to access your [facility] medical records  [id number]        Your Vitals Were     Pulse Height Pulse Oximetry BMI (Body Mass Index)          84 1.727 m (5' 8\") 97% 41.33 kg/m2         Blood Pressure from Last 3 Encounters:   08/06/18 (!) 88/0   07/06/18 (!) 82/0 "   07/06/18 (!) 108/94    Weight from Last 3 Encounters:   08/06/18 123.3 kg (271 lb 12.8 oz)   08/01/18 125.2 kg (276 lb)   07/06/18 124.1 kg (273 lb 11.2 oz)              We Performed the Following     (82722) INTERROGATE VENT ASSIST DEVICE, IN PERSON, DAGO JETER ANALYSIS PARAMETERS     Lactate Dehydrogenase          Today's Medication Changes          These changes are accurate as of 8/6/18  2:58 PM.  If you have any questions, ask your nurse or doctor.               These medicines have changed or have updated prescriptions.        Dose/Directions    COZAAR 25 MG tablet   This may have changed:  how much to take   Used for:  LVAD (left ventricular assist device) present (H), Chronic systolic congestive heart failure (H)   Generic drug:  losartan   Changed by:  Cate Marshall, NP        Dose:  37.5 mg   Take 1.5 tablets (37.5 mg) by mouth daily   Quantity:  90 tablet   Refills:  3                Primary Care Provider Office Phone # Fax #    Jovany Salas [phone]       [place] [address]        Equal Access to Services     [facility] Center[facility]FF[facility] : Hadii andrea chopra haddorotao Sobrandon, waaxda luqadaha, qaybta kaalmada ademichaelyada, kendra ely. So [facility] [phone].    ATENCIÓN: Si habla español, tiene a roger disposición servicios gratuitos de asistencia lingüística. [facility] [phone].    We comply with applicable federal civil rights laws and [place] laws. We do not discriminate on the basis of race, color, national origin, age, disability, sex, sexual orientation, or gender identity.            Thank you!     Thank you for choosing [facility]  for your care. Our goal is always to provide you with excellent care. Hearing back from our patients is one way we can continue to improve our services. Please take a few minutes to complete the written survey that you may receive in the mail after your visit with us. Thank you!              Your Updated Medication List - Protect others around you: Learn how to safely use, store and throw away your medicines at www.disposemymeds.org.          This list is accurate as of 8/6/18  2:58 PM.  Always use your most recent med list.                   Brand Name Dispense Instructions for use Diagnosis    acetaminophen 325 MG tablet    TYLENOL    100 tablet    Take 2 tablets (650 mg) by mouth every 6 hours    Acute post-operative pain       ALDACTONE 25 MG tablet   Generic drug:  spironolactone     30 tablet    Take 1 tablet (25 mg) by mouth daily    LVAD (left ventricular assist device) present (H), Chronic systolic congestive heart failure (H), Nerve pain, Acute idiopathic gout, unspecified site       allopurinol 100 MG tablet    ZYLOPRIM    60 tablet    Take 1 tablet (100 mg) by mouth daily    Acute idiopathic gout, unspecified site       aspirin 81 MG chewable tablet     36 tablet    1 tablet (81 mg) by Oral or Feeding Tube route daily    LVAD (left ventricular assist device) present (H)       BREO ELLIPTA 200-25 MCG/INH oral inhaler   Generic drug:  fluticasone-vilanterol      Inhale 1 puff into the lungs daily        bumetanide 1 MG tablet    BUMEX    120 tablet    Take 2 tablets (2 mg) by mouth 2 times daily    LVAD (left ventricular assist device) present (H)       celeXA 20 MG tablet   Generic drug:  citalopram      Take 20 mg by mouth daily        COZAAR 25 MG tablet   Generic drug:  losartan     90 tablet    Take 1.5 tablets (37.5 mg) by mouth daily    LVAD (left ventricular assist device) present (H), Chronic systolic congestive heart failure (H)       cyanocobalamin 1000 MCG/ML injection    VITAMIN B12     Inject 1,000 mcg into the muscle every 30 days        gabapentin 300 MG capsule    NEURONTIN    90 capsule    Take 1 capsule (300 mg) by mouth 3 times daily    Nerve pain, LVAD (left ventricular assist device) present (H), Acute idiopathic gout, unspecified site, Chronic systolic congestive heart  failure (H)       INCRUSE ELLIPTA 62.5 MCG/INH oral inhaler   Generic drug:  umeclidinium      Inhale 1 puff into the lungs daily        ipratropium - albuterol 0.5 mg/2.5 mg/3 mL 0.5-2.5 (3) MG/3ML neb solution    DUONEB     Take 3 mLs by nebulization every 4 hours as needed for shortness of breath / dyspnea or wheezing        metFORMIN 500 MG tablet    GLUCOPHAGE    60 tablet    Take 0.5 tablets (250 mg) by mouth 2 times daily (with meals)    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, without long-term current use of insulin, unspecified CKD stage (H)       metoprolol succinate 25 MG 24 hr tablet    TOPROL-XL    180 tablet    Take 1 tablet (25 mg) by mouth 2 times daily    Chronic systolic congestive heart failure (H), LVAD (left ventricular assist device) present (H)       pantoprazole 40 MG EC tablet    PROTONIX    60 tablet    Take 1 tablet (40 mg) by mouth every morning    Gastroesophageal reflux disease without esophagitis       polyethylene glycol Packet    MIRALAX/GLYCOLAX    7 packet    Take 17 g by mouth daily as needed for constipation (for no bm in 1 day)    Acute post-operative pain       potassium chloride 20 MEQ Packet    KLOR-CON    60 packet    Take 20 mEq by mouth daily    LVAD (left ventricular assist device) present (H), Chronic systolic congestive heart failure (H)       predniSONE 20 MG tablet    DELTASONE    20 tablet    Take 0.5 tablets (10 mg) by mouth daily    Acute idiopathic gout, unspecified site, LVAD (left ventricular assist device) present (H), Nerve pain, Chronic systolic congestive heart failure (H)       PROAIR  (90 Base) MCG/ACT Inhaler   Generic drug:  albuterol      Inhale 2 puffs into the lungs every 4 hours as needed for shortness of breath / dyspnea or wheezing        senna-docusate 8.6-50 MG per tablet    SENOKOT-S;PERICOLACE    100 tablet    Take 2 tablets by mouth 2 times daily as needed for constipation    Acute post-operative pain        SINGULAIR 10 MG tablet   Generic drug:  montelukast      Take 10 mg by mouth At Bedtime        traMADol 50 MG tablet    ULTRAM    28 tablet    Take 2 tablets (100 mg) by mouth every 6 hours as needed for moderate pain or breakthrough pain    Acute post-operative pain       * warfarin 3 MG tablet    COUMADIN    30 tablet    Use as directed.    LVAD (left ventricular assist device) present (H)       * warfarin 5 MG tablet    COUMADIN    75 tablet    Take 2-2.5 tablets daily or as directed by coumadin clinic.    LVAD (left ventricular assist device) present (H), Chronic systolic congestive heart failure (H)       zinc sulfate 220 (50 Zn) MG capsule    ZINCATE     Take 220 mg by mouth 2 times daily        * Notice:  This list has 2 medication(s) that are the same as other medications prescribed for you. Read the directions carefully, and ask your doctor or other care provider to review them with you.

## 2018-08-06 NOTE — NURSING NOTE
Vitals completed successfully and medication reconciled. Tori Cuadra MA  Chief Complaint   Patient presents with     Follow Up For     VAD fu 1 month

## 2018-08-06 NOTE — MR AVS SNAPSHOT
Jim Willingham   8/6/2018   Anticoagulation Therapy Visit    Description:  63 year old male   Provider:  Kirsty Bermudez, RN   Department:  [facility]           INR as of 8/6/2018     Today's INR 1.89!      Anticoagulation Summary as of 8/6/2018     INR goal 2.0-3.0   Today's INR 1.89!   Full warfarin instructions 8/6: 10 mg; Otherwise 5 mg on Fri; 7.5 mg all other days   Next INR check 8/8/2018    Indications   LVAD (left ventricular assist device) present (H) [Z95.811]  Long-term (current) use of anticoagulants [Z79.01] [Z79.01]         August 2018 Details    Sun Mon Tue Wed Thu Fri Sat        1               2               3               4                 5               6      10 mg   See details      7      7.5 mg         8            9               10               11                 12               13               14               15               16               17               18                 19               20               21               22               23               24               25                 26               27               28               29               30               31                 Date Details   08/06 This INR check       Date of next INR:  8/8/2018         How to take your warfarin dose     To take:  7.5 mg Take 1.5 of the 5 mg tablets.    To take:  10 mg Take 2 of the 5 mg tablets.

## 2018-08-06 NOTE — LETTER
8/6/2018      RE: Jim Willingham  [address] [facility] [address]       Dear Colleague,    Thank you for the opportunity to participate in the care of your patient, Jim Willingham, at the [facility]. Please see a copy of my visit note below.    HPI: Jim Willingham is a 63-year-old gentleman with a past medical history of chronic kidney disease stage III, diabetes mellitus type 2, CECILIA, obesity, hypertension, COPD, asthma, and nonischemic cardiomyopathy (EF 20%), status post HeartMate 2 LVAD placement on June 16, 2017, complicated by arrhythmias, WALTER, and left small pleural effusion.  He returns for routine follow up.       Since increasing his Bumex to 2 mg in the am and 1 mg in the evening at his last visit with Dr. Montgomery, he feels that while his breathing has improved, it could be better.   He continues to get SOB and fatigued after carrying his grand daugther or carrying bags of groceries into the house.  He has to stop and rest after climbing a flight of stairs.  Since starting the iron infusions, he feels less fatigued.  Denies SOB at rest, PND, or orthopnea.  Uses CPAP regularly.  Denies chest pain, palpitations, lightheadedness, dizziness, near syncopal/syncopal episodes.     His weight at home has been around 261 lbs. He is following his sodium restrictions, but struggles with the fluid restrictions.  He currently has no LVAD concerns.  Denies HA, neurological changes, hematuria, hematochezia, melena, fever, chills or any concerns for driveline infection. He has had trace epistaxis with blowing his nose, but no other signs of bleeding.      PAST MEDICAL HISTORY:  Past Medical History:   Diagnosis Date     Benign essential hypertension 5/11/2017     Cardiomyopathy, unspecified 5/8/2017     CKD (chronic kidney disease) stage 3, GFR 30-59 ml/min 5/11/2017     Depression 5/11/2017     Diabetes mellitus (H) 5/11/2017     H/O gastric bypass  5/11/2017     ICD (implantable cardioverter-defibrillator), biventricular, in situ 5/11/2017     LVAD (left ventricular assist device) present (H)      NICM (nonischemic cardiomyopathy) (H)/ EF 20% 5/11/2017    ECHO: LVEDd. 7.66 cm, Restrictive pattern , Severe mitral valve regurgitation     CECILIA (obstructive sleep apnea) 5/11/2017     Paroxysmal atrial fibrillation (H) 5/11/2017     Paroxysmal VT (H) 5/11/2017     Uncomplicated asthma      Vitamin B12 deficiency (non anemic) 5/11/2017       FAMILY HISTORY:  Family History   Problem Relation Age of Onset     Cerebrovascular Disease Mother      Diabetes Mother      Hypertension Mother      Coronary Artery Disease Father      Type 2 Diabetes Father        SOCIAL HISTORY:  Social History     Social History     Marital status:      Spouse name: N/A     Number of children: N/A     Years of education: N/A     Social History Main Topics     Smoking status: Former Smoker     Quit date: 1995     Smokeless tobacco: Never Used     Alcohol use No     Drug use: No     Sexual activity: Yes     Partners: Female     Other Topics Concern     Parent/Sibling W/ Cabg, Mi Or Angioplasty Before 65f 55m? No     Social History Narrative       CURRENT MEDICATIONS:  Current Outpatient Prescriptions   Medication Sig Dispense Refill     acetaminophen (TYLENOL) 325 MG tablet Take 2 tablets (650 mg) by mouth every 6 hours 100 tablet      albuterol (ALBUTEROL) 108 (90 BASE) MCG/ACT Inhaler Inhale 2 puffs into the lungs every 4 hours as needed for shortness of breath / dyspnea or wheezing       allopurinol (ZYLOPRIM) 100 MG tablet Take 1 tablet (100 mg) by mouth daily 60 tablet 5     aspirin 81 MG chewable tablet 1 tablet (81 mg) by Oral or Feeding Tube route daily 36 tablet      bumetanide (BUMEX) 1 MG tablet Take 2 tablets (2 mg) by mouth 2 times daily 120 tablet 11     citalopram (CELEXA) 20 MG tablet Take 20 mg by mouth daily       cyanocobalamin (VITAMIN B12) 1000 MCG/ML injection  Inject 1,000 mcg into the muscle every 30 days       fluticasone-vilanterol (BREO ELLIPTA) 200-25 MCG/INH oral inhaler Inhale 1 puff into the lungs daily       gabapentin (NEURONTIN) 300 MG capsule Take 1 capsule (300 mg) by mouth 3 times daily 90 capsule      ipratropium - albuterol 0.5 mg/2.5 mg/3 mL (DUONEB) 0.5-2.5 (3) MG/3ML neb solution Take 3 mLs by nebulization every 4 hours as needed for shortness of breath / dyspnea or wheezing       losartan (COZAAR) 25 MG tablet Take 1 tablet (25 mg) by mouth daily 90 tablet 3     metFORMIN (GLUCOPHAGE) 500 MG tablet Take 0.5 tablets (250 mg) by mouth 2 times daily (with meals) 60 tablet 0     metoprolol succinate (TOPROL-XL) 25 MG 24 hr tablet Take 1 tablet (25 mg) by mouth 2 times daily 180 tablet 5     montelukast (SINGULAIR) 10 MG tablet Take 10 mg by mouth At Bedtime       pantoprazole (PROTONIX) 40 MG EC tablet Take 1 tablet (40 mg) by mouth every morning 60 tablet 5     polyethylene glycol (MIRALAX/GLYCOLAX) Packet Take 17 g by mouth daily as needed for constipation (for no bm in 1 day) 7 packet      potassium chloride (KLOR-CON) 20 MEQ Packet Take 20 mEq by mouth daily 60 packet 5     predniSONE (DELTASONE) 20 MG tablet Take 0.5 tablets (10 mg) by mouth daily 20 tablet 0     spironolactone (ALDACTONE) 25 MG tablet Take 1 tablet (25 mg) by mouth daily 30 tablet 5     traMADol (ULTRAM) 50 MG tablet Take 2 tablets (100 mg) by mouth every 6 hours as needed for moderate pain or breakthrough pain 28 tablet      umeclidinium (INCRUSE ELLIPTA) 62.5 MCG/INH oral inhaler Inhale 1 puff into the lungs daily       warfarin (COUMADIN) 3 MG tablet Use as directed. 30 tablet 0     warfarin (COUMADIN) 5 MG tablet Take 2-2.5 tablets daily or as directed by coumadin clinic. 75 tablet 5     zinc sulfate (ZINCATE) 220 (50 ZN) MG capsule Take 220 mg by mouth 2 times daily       senna-docusate (SENOKOT-S;PERICOLACE) 8.6-50 MG per tablet Take 2 tablets by mouth 2 times daily as needed  "for constipation (Patient not taking: Reported on 7/6/2018) 100 tablet 0       ROS:  Constitutional: Denies fever, chills, or diaphoresis.  +weight loss.  Overall fatigue.  ENT: Denies visual disturbance, hearing loss, epistaxis, vertigo.  +Epistaxis. (slight)  Respiratory:  See HPI.     Cardiovascular: As per HPI.   GI: Denies nausea, vomiting, diarrhea, hematemesis, melena, or hematochezia.   : Denies urinary frequency, dysuria, or hematuria.   Integument: Negative for bruising, rash, or pruritis.  Psychiatric: +Depression, stable.   Neuro: Negative for headaches.  +numbness and tingling, unchanged.  Endocrinology:+DM, BG variable.   Musculoskeletal: Negative for gout or joint pain.    EXAM:  BP (!) 88/0 (BP Location: Right arm, Patient Position: Chair, Cuff Size: Adult Large)  Pulse 84  Ht 1.727 m (5' 8\")  Wt 123.3 kg (271 lb 12.8 oz)  SpO2 97%  BMI 41.33 kg/m2  Home weight: 261 lbs  General: alert, articulate, and in no acute distress. Pleasant.   HEENT: normocephalic, atraumatic, anicteric sclera, EOMI, mucosa moist, no cyanosis.    Neck: neck supple.  No adenopathy, masses, or carotid bruits.  JVP 11 cm.   Heart: LVAD hum present, normal S1/S2, no murmur, gallop, rub.   Lungs: clear, no rales, ronchi, or wheezing.  No accessory muscle use, respirations unlabored.   Abdomen: Firmer, non-tender, bowel sounds present, Abdomen distended.  Extremities: no clubbing, cyanosis, or edema.  No palpable pedal pulses.  Neurological: Alert and oriented x 3.  Normal speech and affect, no gross motor deficits  Skin:  No ecchymoses or rashes.  Weekly driveline dressing  CDI.  No evidence of erythema or drainage or concerns for infection.       Labs:  CBC RESULTS:  Lab Results   Component Value Date    WBC 10.4 07/06/2018    RBC 4.59 07/06/2018    HGB 11.7 (L) 07/06/2018    HCT 38.0 (L) 07/06/2018    MCV 83 07/06/2018    MCH 25.5 (L) 07/06/2018    MCHC 30.8 (L) 07/06/2018    RDW 16.3 (H) 07/06/2018     07/06/2018 "       CMP RESULTS:  Lab Results   Component Value Date     2018    POTASSIUM 4.4 2018    CHLORIDE 102 2018    CO2 29 2018    ANIONGAP 6 2018    GLC 78 2018    BUN 36 (H) 2018    CR 1.58 (H) 2018    GFRESTIMATED 45 (L) 2018    GFRESTBLACK 54 (L) 2018    QAMAR 8.6 2018    BILITOTAL 0.6 2018    ALBUMIN 4.0 2018    ALKPHOS 112 2018    ALT 63 2018    AST 28 2018        INR RESULTS:  Lab Results   Component Value Date    INR 1.89 (H) 2018       No components found for: CK  Lab Results   Component Value Date    MAG 2.1 2017     Lab Results   Component Value Date    NTBNP 1351 (H) 2018       Most recent echocardiogram:  Recent Results (from the past 4320 hour(s))   Echo LVAD Complete *    Narrative    [id number]  [id number]  [id number]  [id number]^JOSE R^CATALINA^ABDELRAHMAN           [facility]  Echocardiography Laboratory  [address] [place], [place] [address]     Name: LOIS FULLER  MRN: [id number]  : 1955  Study Date: 2018 09:03 AM  Age: 63 yrs  Gender: Male  Patient Location: [facility]  Reason For Study: LVAD  Ordering Physician: CATALINA ODELL  Referring Physician: CATALINA ODELL  Performed By: Afshin Bneoit RDCS     BSA: 2.3 m2  Height: 68 in  Weight: 260 lb  HR: 105  _____________________________________________________________________________  __        Procedure  LVAD Echocardiogram with two-dimensional, color and spectral Doppler  performed. Technically difficult study.  _____________________________________________________________________________  __        Interpretation Summary  Technically difficult study.  HMII 9600 RPM  Severely (EF 10-20%) reduced left ventricular function is present. LVAD  cannula in the apex is not well seen. Velocities of the inflow and outflow not  interpretable.  Moderate right ventricular dilatation present. Global  right ventricular  function is moderately reduced.  There is mitral and tricuspid regurgitation that is difficult to quantify on  this exam.  The inferior vena cava was normal in size with preserved respiratory  variability.  No pericardial effusion is present.  _____________________________________________________________________________  __        Left Ventricle  Severely (EF 10-20%) reduced left ventricular function is present. LVAD  cannula in the apex is not well seen. Velocities of the inflow and outflow not  interpretable.     Right Ventricle  Moderate right ventricular dilatation present. Global right ventricular  function is moderately reduced.     Aortic Valve  Aortic valve partially opens during each cardiac cycle.     Vessels  The inferior vena cava was normal in size with preserved respiratory  variability.        Pericardium  No pericardial effusion is present.     Compared to Previous Study  This study was compared with the study from 18, biventricular function  unchanged .  _____________________________________________________________________________  __     MMode/2D Measurements & Calculations  IVSd: 1.1 cm  LVIDd: 6.2 cm  LVIDs: 5.7 cm  LVPWd: 1.1 cm  FS: 8.8 %  LV mass(C)d: 291.2 grams  LV mass(C)dI: 127.4 grams/m2  RWT: 0.36        Doppler Measurements & Calculations  PA acc time: 0.10 sec        _____________________________________________________________________________  __        Report approved by: Shayne Page 2018 12:24 PM      Echo LVAD Complete *    Narrative    [id number]  Count includes the [facility]61  [id number]  [id number]^KEL^BRITNEY^ANGELIA           [facility],[place]  Echocardiography Laboratory  [address] [place], [place] [address]     Name: LOIS FULLER  MRN: [id number]  : 1955  Study Date: 2018 07:31 AM  Age: 62 yrs  Gender: Male  Patient Location: [facility]  Reason For Study: LVAD (left ventricular assist device) present (H),  Ordering Physician:  BRITNEY BEGUM  Referring Physician: BRITNEY BEGUM  Performed By: Rhiannon Loev RDCS, RVT     BSA: 2.3 m2  Height: 68 in  Weight: 259 lb  _____________________________________________________________________________  __        Procedure  LVAD Echocardiogram with two-dimensional, color and spectral Doppler  performed.  _____________________________________________________________________________  __        Interpretation Summary  HMII 9600 RPM  Normal inflow and outflow velocities. Left ventricular wall thickness is  normal. Moderate left ventricular dilation is present. The Ejection Fraction  is estimated at 5-10%. Severe diffuse hypokinesis is present.  Mild to moderate right ventricular dilation is present. Global right  ventricular function is moderately to severely reduced.  The aortic valve opens partially with each systole.  There is a mobile echodensity on the right coronary cusp measuring 0.78 cm x  0.29 cm. This echodensity is more prominent when compared to prior study.Could  be aortic valve sclerosis,but cannot rule out other pathology.  _____________________________________________________________________________  __        Left Ventricle  Left ventricular wall thickness is normal. Moderate left ventricular dilation  is present. The Ejection Fraction is estimated at 5-10%. Severe diffuse  hypokinesis is present.     Right Ventricle  Mild to moderate right ventricular dilation is present. Global right  ventricular function is moderately to severely reduced. A pacemaker lead is  noted in the right ventricle.     Atria  Atria are not well visualized but appear to be at least mildly enlarged.        Mitral Valve  Trace mitral insufficiency is present.     Aortic Valve  The aortic valve opens partially and there is minimal flow through the aortic  valve during systole. There is a mobile echodensity on the right coronary cusp  measuring 0.78 cm x 0.29 cm. This could represent thrombus vs vegetation  in  the appropriate clinical context.     Tricuspid Valve  Trace tricuspid insufficiency is present.     Pulmonic Valve  The pulmonic valve cannot be assessed.     Vessels  The inferior vena cava was normal in size with preserved respiratory  variability. The aorta root is normal.     Pericardium  No pericardial effusion is present.        Compared to Previous Study  This study was compared with the study from 9/15/2018 .     Attestation  I have personally viewed the imaging and agree with the interpretation and  report as documented by the fellow, Maribell Hartley, and/or edited by  me.  _____________________________________________________________________________  __     MMode/2D Measurements & Calculations  IVSd: 0.83 cm  LVIDd: 6.4 cm  LVIDs: 6.2 cm  LVPWd: 1.1 cm  FS: 3.6 %  LV mass(C)d: 274.7 grams  LV mass(C)dI: 120.4 grams/m2  RVOT diam: 2.4 cm  RWT: 0.36        Doppler Measurements & Calculations  MV E max sloane: 72.1 cm/sec  MV A max sloane: 46.4 cm/sec  MV E/A: 1.6  MV dec time: 0.17 sec  PA V2 max: 102.0 cm/sec  PA max P.2 mmHg        _____________________________________________________________________________  __        Report approved by: Shayne Richardson 2018 09:45 AM          Assessment and Plan:    Jim is a 63 year old gentleman s/p HM II LVAD placement  who returns for routine follow up.  He appears hypervolemic today.  I have increased his Bumex to 2 mg twice daily and his Losartan to 37.5 mg daily secondary to elevated MAPS .  He will repeat labs in one week to reassess his kidney function and potassium levels with the medication adjustments.  He will return to clinic in October as previously arranged.     1.  Chronic systolic heart failure secondary to NICM.  Stage D  NYHA Class III  ACEi/ARB: yes, increase Losartan to 37.5 mg daily.   BB: yes, Toprol XL 25 mg twice daily.   Aldosterone antagonist: yes, spironolactone 25 mg daily.   SCD prophylaxis: CRT-D  Fluid status:  hypervolemic.  Diurese as outlined above.   Sleep Apnea:  Using CPAP    2.  S/P LVAD implant as DT secondary to obesity.  Patient would like to be considered for transplant and is working on weight loss.    Anticoagulation: INR goal 2-3.  Continue Warfarin. INR clinic managing.   Antiplatelet:  ASA 81 mg daily.   MAP:  Not controlled at 88.  Increase Losartan to 37.5 mg daily.   LDH:  352 on 7/6.  Repeat LDH at return office visit in October.  VAD Interrogation today: VAD interrogation reviewed with VAD coordinator. Agree with findings. Some PI events, no power spikes or significant speed drops, suspicious of pump malfunction noted.     3.  Obesity.  BMI today 41.33.  Continue working on weight loss for overall health maintenance.     4.  CKD:  Creatinine 1.58 today.  (1.30 on 7/13).  Diurese as outlined above.      5.  HTN:  Not controlled.  MAP 88.  Increase Losartan to 37.5 mg daily. Goal MAP of <85.     6. Follow-up:  BMP in one week.  LVAD clinic in October as previously arranged.        Cate DUMONT, CNP  Advanced Heart Failure/LVAD clinic  [phone]

## 2018-08-06 NOTE — PATIENT INSTRUCTIONS
Medications:  1. INCREASE Bumex to 2mg twice daily  2. INCREASE Losartan to 37.5mg daily    Follow-up:  1. Please schedule an appt with an NP in October    Instructions:  1. Continue working on your weight - it is hard work!!  2. Get labs drawn in one week - we want to keep an eye on your kidney function    Page the VAD Coordinator on call if you gain more than 3 lb in a day or 5 in a week. Please also page if you feel unwell or have alarms.     Great to see you in clinic today. To Page the VAD Coordinator on call, dial [phone] option #[phone] and ask to speak to the VAD coordinator on call.

## 2018-08-06 NOTE — PROGRESS NOTES
HPI: Jim Willingham is a 63-year-old gentleman with a past medical history of chronic kidney disease stage III, diabetes mellitus type 2, CECILIA, obesity, hypertension, COPD, asthma, and nonischemic cardiomyopathy (EF 20%), status post HeartMate 2 LVAD placement on June 16, 2017, complicated by arrhythmias, WALTER, and left small pleural effusion.  He returns for routine follow up.       Since increasing his Bumex to 2 mg in the am and 1 mg in the evening at his last visit with Dr. Montgomery, he feels that while his breathing has improved, it could be better.   He continues to get SOB and fatigued after carrying his grand daugther or carrying bags of groceries into the house.  He has to stop and rest after climbing a flight of stairs.  Since starting the iron infusions, he feels less fatigued.  Denies SOB at rest, PND, or orthopnea.  Uses CPAP regularly.  Denies chest pain, palpitations, lightheadedness, dizziness, near syncopal/syncopal episodes.     His weight at home has been around 261 lbs. He is following his sodium restrictions, but struggles with the fluid restrictions.  He currently has no LVAD concerns.  Denies HA, neurological changes, hematuria, hematochezia, melena, fever, chills or any concerns for driveline infection. He has had trace epistaxis with blowing his nose, but no other signs of bleeding.      PAST MEDICAL HISTORY:  Past Medical History:   Diagnosis Date     Benign essential hypertension 5/11/2017     Cardiomyopathy, unspecified 5/8/2017     CKD (chronic kidney disease) stage 3, GFR 30-59 ml/min 5/11/2017     Depression 5/11/2017     Diabetes mellitus (H) 5/11/2017     H/O gastric bypass 5/11/2017     ICD (implantable cardioverter-defibrillator), biventricular, in situ 5/11/2017     LVAD (left ventricular assist device) present (H)      NICM (nonischemic cardiomyopathy) (H)/ EF 20% 5/11/2017    ECHO: LVEDd. 7.66 cm, Restrictive pattern , Severe mitral valve regurgitation     CECILIA (obstructive sleep  apnea) 5/11/2017     Paroxysmal atrial fibrillation (H) 5/11/2017     Paroxysmal VT (H) 5/11/2017     Uncomplicated asthma      Vitamin B12 deficiency (non anemic) 5/11/2017       FAMILY HISTORY:  Family History   Problem Relation Age of Onset     Cerebrovascular Disease Mother      Diabetes Mother      Hypertension Mother      Coronary Artery Disease Father      Type 2 Diabetes Father        SOCIAL HISTORY:  Social History     Social History     Marital status:      Spouse name: N/A     Number of children: N/A     Years of education: N/A     Social History Main Topics     Smoking status: Former Smoker     Quit date: 1995     Smokeless tobacco: Never Used     Alcohol use No     Drug use: No     Sexual activity: Yes     Partners: Female     Other Topics Concern     Parent/Sibling W/ Cabg, Mi Or Angioplasty Before 65f 55m? No     Social History Narrative       CURRENT MEDICATIONS:  Current Outpatient Prescriptions   Medication Sig Dispense Refill     acetaminophen (TYLENOL) 325 MG tablet Take 2 tablets (650 mg) by mouth every 6 hours 100 tablet      albuterol (ALBUTEROL) 108 (90 BASE) MCG/ACT Inhaler Inhale 2 puffs into the lungs every 4 hours as needed for shortness of breath / dyspnea or wheezing       allopurinol (ZYLOPRIM) 100 MG tablet Take 1 tablet (100 mg) by mouth daily 60 tablet 5     aspirin 81 MG chewable tablet 1 tablet (81 mg) by Oral or Feeding Tube route daily 36 tablet      bumetanide (BUMEX) 1 MG tablet Take 2 tablets (2 mg) by mouth 2 times daily 120 tablet 11     citalopram (CELEXA) 20 MG tablet Take 20 mg by mouth daily       cyanocobalamin (VITAMIN B12) 1000 MCG/ML injection Inject 1,000 mcg into the muscle every 30 days       fluticasone-vilanterol (BREO ELLIPTA) 200-25 MCG/INH oral inhaler Inhale 1 puff into the lungs daily       gabapentin (NEURONTIN) 300 MG capsule Take 1 capsule (300 mg) by mouth 3 times daily 90 capsule      ipratropium - albuterol 0.5 mg/2.5 mg/3 mL (DUONEB) 0.5-2.5  (3) MG/3ML neb solution Take 3 mLs by nebulization every 4 hours as needed for shortness of breath / dyspnea or wheezing       losartan (COZAAR) 25 MG tablet Take 1 tablet (25 mg) by mouth daily 90 tablet 3     metFORMIN (GLUCOPHAGE) 500 MG tablet Take 0.5 tablets (250 mg) by mouth 2 times daily (with meals) 60 tablet 0     metoprolol succinate (TOPROL-XL) 25 MG 24 hr tablet Take 1 tablet (25 mg) by mouth 2 times daily 180 tablet 5     montelukast (SINGULAIR) 10 MG tablet Take 10 mg by mouth At Bedtime       pantoprazole (PROTONIX) 40 MG EC tablet Take 1 tablet (40 mg) by mouth every morning 60 tablet 5     polyethylene glycol (MIRALAX/GLYCOLAX) Packet Take 17 g by mouth daily as needed for constipation (for no bm in 1 day) 7 packet      potassium chloride (KLOR-CON) 20 MEQ Packet Take 20 mEq by mouth daily 60 packet 5     predniSONE (DELTASONE) 20 MG tablet Take 0.5 tablets (10 mg) by mouth daily 20 tablet 0     spironolactone (ALDACTONE) 25 MG tablet Take 1 tablet (25 mg) by mouth daily 30 tablet 5     traMADol (ULTRAM) 50 MG tablet Take 2 tablets (100 mg) by mouth every 6 hours as needed for moderate pain or breakthrough pain 28 tablet      umeclidinium (INCRUSE ELLIPTA) 62.5 MCG/INH oral inhaler Inhale 1 puff into the lungs daily       warfarin (COUMADIN) 3 MG tablet Use as directed. 30 tablet 0     warfarin (COUMADIN) 5 MG tablet Take 2-2.5 tablets daily or as directed by coumadin clinic. 75 tablet 5     zinc sulfate (ZINCATE) 220 (50 ZN) MG capsule Take 220 mg by mouth 2 times daily       senna-docusate (SENOKOT-S;PERICOLACE) 8.6-50 MG per tablet Take 2 tablets by mouth 2 times daily as needed for constipation (Patient not taking: Reported on 7/6/2018) 100 tablet 0       ROS:  Constitutional: Denies fever, chills, or diaphoresis.  +weight loss.  Overall fatigue.  ENT: Denies visual disturbance, hearing loss, epistaxis, vertigo.  +Epistaxis. (slight)  Respiratory:  See HPI.     Cardiovascular: As per HPI.   GI:  "Denies nausea, vomiting, diarrhea, hematemesis, melena, or hematochezia.   : Denies urinary frequency, dysuria, or hematuria.   Integument: Negative for bruising, rash, or pruritis.  Psychiatric: +Depression, stable.   Neuro: Negative for headaches.  +numbness and tingling, unchanged.  Endocrinology:+DM, BG variable.   Musculoskeletal: Negative for gout or joint pain.    EXAM:  BP (!) 88/0 (BP Location: Right arm, Patient Position: Chair, Cuff Size: Adult Large)  Pulse 84  Ht 1.727 m (5' 8\")  Wt 123.3 kg (271 lb 12.8 oz)  SpO2 97%  BMI 41.33 kg/m2  Home weight: 261 lbs  General: alert, articulate, and in no acute distress. Pleasant.   HEENT: normocephalic, atraumatic, anicteric sclera, EOMI, mucosa moist, no cyanosis.    Neck: neck supple.  No adenopathy, masses, or carotid bruits.  JVP 11 cm.   Heart: LVAD hum present, normal S1/S2, no murmur, gallop, rub.   Lungs: clear, no rales, ronchi, or wheezing.  No accessory muscle use, respirations unlabored.   Abdomen: Firmer, non-tender, bowel sounds present, Abdomen distended.  Extremities: no clubbing, cyanosis, or edema.  No palpable pedal pulses.  Neurological: Alert and oriented x 3.  Normal speech and affect, no gross motor deficits  Skin:  No ecchymoses or rashes.  Weekly driveline dressing  CDI.  No evidence of erythema or drainage or concerns for infection.       Labs:  CBC RESULTS:  Lab Results   Component Value Date    WBC 10.4 07/06/2018    RBC 4.59 07/06/2018    HGB 11.7 (L) 07/06/2018    HCT 38.0 (L) 07/06/2018    MCV 83 07/06/2018    MCH 25.5 (L) 07/06/2018    MCHC 30.8 (L) 07/06/2018    RDW 16.3 (H) 07/06/2018     07/06/2018       CMP RESULTS:  Lab Results   Component Value Date     08/03/2018    POTASSIUM 4.4 08/03/2018    CHLORIDE 102 08/03/2018    CO2 29 08/03/2018    ANIONGAP 6 08/03/2018    GLC 78 08/03/2018    BUN 36 (H) 08/03/2018    CR 1.58 (H) 08/03/2018    GFRESTIMATED 45 (L) 08/03/2018    GFRESTBLACK 54 (L) 08/03/2018    QAMAR " 8.6 2018    BILITOTAL 0.6 2018    ALBUMIN 4.0 2018    ALKPHOS 112 2018    ALT 63 2018    AST 28 2018        INR RESULTS:  Lab Results   Component Value Date    INR 1.89 (H) 2018       No components found for: CK  Lab Results   Component Value Date    MAG 2.1 2017     Lab Results   Component Value Date    NTBNP 1351 (H) 2018       Most recent echocardiogram:  Recent Results (from the past 4320 hour(s))   Echo LVAD Complete *    Narrative    [id number]  ECH61  [id number]  [id number]^JOSE R^CATALINA^ABDELRAHMAN           [facility],[place]  Echocardiography Laboratory  [address] [place] [id number]     Name: LOIS FULLER  MRN: [id number]  : 1955  Study Date: 2018 09:03 AM  Age: 63 yrs  Gender: Male  Patient Location: [facility]  Reason For Study: LVAD  Ordering Physician: CATALINA ODELL  Referring Physician: CATALINA ODELL  Performed By: Afshin Benoit RDCS     BSA: 2.3 m2  Height: 68 in  Weight: 260 lb  HR: 105  _____________________________________________________________________________  __        Procedure  LVAD Echocardiogram with two-dimensional, color and spectral Doppler  performed. Technically difficult study.  _____________________________________________________________________________  __        Interpretation Summary  Technically difficult study.  HMII 9600 RPM  Severely (EF 10-20%) reduced left ventricular function is present. LVAD  cannula in the apex is not well seen. Velocities of the inflow and outflow not  interpretable.  Moderate right ventricular dilatation present. Global right ventricular  function is moderately reduced.  There is mitral and tricuspid regurgitation that is difficult to quantify on  this exam.  The inferior vena cava was normal in size with preserved respiratory  variability.  No pericardial effusion is  present.  _____________________________________________________________________________  __        Left Ventricle  Severely (EF 10-20%) reduced left ventricular function is present. LVAD  cannula in the apex is not well seen. Velocities of the inflow and outflow not  interpretable.     Right Ventricle  Moderate right ventricular dilatation present. Global right ventricular  function is moderately reduced.     Aortic Valve  Aortic valve partially opens during each cardiac cycle.     Vessels  The inferior vena cava was normal in size with preserved respiratory  variability.        Pericardium  No pericardial effusion is present.     Compared to Previous Study  This study was compared with the study from 18, biventricular function  unchanged .  _____________________________________________________________________________  __     MMode/2D Measurements & Calculations  IVSd: 1.1 cm  LVIDd: 6.2 cm  LVIDs: 5.7 cm  LVPWd: 1.1 cm  FS: 8.8 %  LV mass(C)d: 291.2 grams  LV mass(C)dI: 127.4 grams/m2  RWT: 0.36        Doppler Measurements & Calculations  PA acc time: 0.10 sec        _____________________________________________________________________________  __        Report approved by: Shayne Page 2018 12:24 PM      Echo LVAD Complete *    Narrative    [id number]  ECH61  [id number]  [id number]^KEL^BRITNEY^ANGELIA           [facility],[place]  Echocardiography Laboratory  [address] [place] [id number]     Name: LOIS FULLER  MRN: [id number]  : 1955  Study Date: 2018 07:31 AM  Age: 62 yrs  Gender: Male  Patient Location: [facility]  Reason For Study: LVAD (left ventricular assist device) present (H),  Ordering Physician: BRITNEY BEGUM  Referring Physician: BRITNEY BEGUM  Performed By: Rhiannon Love RDCS, RVT     BSA: 2.3 m2  Height: 68 in  Weight: 259 lb  _____________________________________________________________________________  __        Procedure  LVAD  Echocardiogram with two-dimensional, color and spectral Doppler  performed.  _____________________________________________________________________________  __        Interpretation Summary  HMII 9600 RPM  Normal inflow and outflow velocities. Left ventricular wall thickness is  normal. Moderate left ventricular dilation is present. The Ejection Fraction  is estimated at 5-10%. Severe diffuse hypokinesis is present.  Mild to moderate right ventricular dilation is present. Global right  ventricular function is moderately to severely reduced.  The aortic valve opens partially with each systole.  There is a mobile echodensity on the right coronary cusp measuring 0.78 cm x  0.29 cm. This echodensity is more prominent when compared to prior study.Could  be aortic valve sclerosis,but cannot rule out other pathology.  _____________________________________________________________________________  __        Left Ventricle  Left ventricular wall thickness is normal. Moderate left ventricular dilation  is present. The Ejection Fraction is estimated at 5-10%. Severe diffuse  hypokinesis is present.     Right Ventricle  Mild to moderate right ventricular dilation is present. Global right  ventricular function is moderately to severely reduced. A pacemaker lead is  noted in the right ventricle.     Atria  Atria are not well visualized but appear to be at least mildly enlarged.        Mitral Valve  Trace mitral insufficiency is present.     Aortic Valve  The aortic valve opens partially and there is minimal flow through the aortic  valve during systole. There is a mobile echodensity on the right coronary cusp  measuring 0.78 cm x 0.29 cm. This could represent thrombus vs vegetation in  the appropriate clinical context.     Tricuspid Valve  Trace tricuspid insufficiency is present.     Pulmonic Valve  The pulmonic valve cannot be assessed.     Vessels  The inferior vena cava was normal in size with preserved  respiratory  variability. The aorta root is normal.     Pericardium  No pericardial effusion is present.        Compared to Previous Study  This study was compared with the study from 9/15/2018 .     Attestation  I have personally viewed the imaging and agree with the interpretation and  report as documented by the fellow, Maribell Hartley, and/or edited by  me.  _____________________________________________________________________________  __     MMode/2D Measurements & Calculations  IVSd: 0.83 cm  LVIDd: 6.4 cm  LVIDs: 6.2 cm  LVPWd: 1.1 cm  FS: 3.6 %  LV mass(C)d: 274.7 grams  LV mass(C)dI: 120.4 grams/m2  RVOT diam: 2.4 cm  RWT: 0.36        Doppler Measurements & Calculations  MV E max sloane: 72.1 cm/sec  MV A max sloane: 46.4 cm/sec  MV E/A: 1.6  MV dec time: 0.17 sec  PA V2 max: 102.0 cm/sec  PA max P.2 mmHg        _____________________________________________________________________________  __        Report approved by: Shayne Richardson 2018 09:45 AM          Assessment and Plan:    Jim is a 63 year old gentleman s/p HM II LVAD placement  who returns for routine follow up.  He appears hypervolemic today.  I have increased his Bumex to 2 mg twice daily and his Losartan to 37.5 mg daily secondary to elevated MAPS .  He will repeat labs in one week to reassess his kidney function and potassium levels with the medication adjustments.  He will return to clinic in October as previously arranged.     1.  Chronic systolic heart failure secondary to NICM.  Stage D  NYHA Class III  ACEi/ARB: yes, increase Losartan to 37.5 mg daily.   BB: yes, Toprol XL 25 mg twice daily.   Aldosterone antagonist: yes, spironolactone 25 mg daily.   SCD prophylaxis: CRT-D  Fluid status: hypervolemic.  Diurese as outlined above.   Sleep Apnea:  Using CPAP    2.  S/P LVAD implant as DT secondary to obesity.  Patient would like to be considered for transplant and is working on weight loss.    Anticoagulation: INR goal 2-3.   Continue Warfarin. INR clinic managing.   Antiplatelet:  ASA 81 mg daily.   MAP:  Not controlled at 88.  Increase Losartan to 37.5 mg daily.   LDH:  352 on 7/6.  Repeat LDH at return office visit in October.  VAD Interrogation today: VAD interrogation reviewed with VAD coordinator. Agree with findings. Some PI events, no power spikes or significant speed drops, suspicious of pump malfunction noted.     3.  Obesity.  BMI today 41.33.  Continue working on weight loss for overall health maintenance.     4.  CKD:  Creatinine 1.58 today.  (1.30 on 7/13).  Diurese as outlined above.      5.  HTN:  Not controlled.  MAP 88.  Increase Losartan to 37.5 mg daily. Goal MAP of <85.     6. Follow-up:  BMP in one week.  LVAD clinic in October as previously arranged.        Cate DUMONT, CNP  Advanced Heart Failure/LVAD clinic  [phone]

## 2018-08-08 ENCOUNTER — ANTICOAGULATION THERAPY VISIT (OUTPATIENT)
Dept: ANTICOAGULATION | Facility: CLINIC | Age: 63
End: 2018-08-08

## 2018-08-08 DIAGNOSIS — Z95.811 LVAD (LEFT VENTRICULAR ASSIST DEVICE) PRESENT (H): ICD-10-CM

## 2018-08-08 DIAGNOSIS — Z79.01 LONG-TERM (CURRENT) USE OF ANTICOAGULANTS: ICD-10-CM

## 2018-08-08 LAB — INR PPP: 2.13 (ref 0.86–1.14)

## 2018-08-08 PROCEDURE — 85610 PROTHROMBIN TIME: CPT | Performed by: INTERNAL MEDICINE

## 2018-08-08 PROCEDURE — 36415 COLL VENOUS BLD VENIPUNCTURE: CPT | Performed by: INTERNAL MEDICINE

## 2018-08-08 NOTE — PROGRESS NOTES
ANTICOAGULATION FOLLOW-UP CLINIC VISIT    Patient Name:  Jim Willingham  Date:  8/8/2018  Contact Type:  Telephone    SUBJECTIVE:     Patient Findings     Positives No Problem Findings    Comments Per LVAD protocol instructed pt to decrease ASA to 81mg Daily            OBJECTIVE    INR   Date Value Ref Range Status   08/08/2018 2.13 (H) 0.86 - 1.14 Final       ASSESSMENT / PLAN  INR assessment THER    Recheck INR In: 1 WEEK    INR Location Clinic      Anticoagulation Summary as of 8/8/2018     INR goal 2.0-3.0   Today's INR 2.13   Warfarin maintenance plan 5 mg (5 mg x 1) on Fri; 7.5 mg (5 mg x 1.5) all other days   Full warfarin instructions 5 mg on Fri; 7.5 mg all other days   Weekly warfarin total 50 mg   Plan last modified Maru Alonso RN (6/4/2018)   Next INR check 8/15/2018   Priority INR   Target end date     Indications   LVAD (left ventricular assist device) present (H) [Z95.811]  Long-term (current) use of anticoagulants [Z79.01] [Z79.01]         Anticoagulation Episode Summary     INR check location     Preferred lab     Send INR reminders to [facility]    Comments HIPPA Forms mailed 6/26/17  Labs drawn either at [facility] or [facility]      Anticoagulation Care Providers     Provider Role Specialty Phone number    Delisa Montgomery MD Responsible Cardiology [phone]            See the Encounter Report to view Anticoagulation Flowsheet and Dosing Calendar (Go to Encounters tab in chart review, and find the Anticoagulation Therapy Visit)    Spoke with patient. Gave them their lab results and new warfarin recommendation.  No changes in health, medication, or diet. No missed doses, no falls. No signs or symptoms of bleed or clotting.     Radha Pedro RN

## 2018-08-08 NOTE — MR AVS SNAPSHOT
Jim Willingham   8/8/2018   Anticoagulation Therapy Visit    Description:  63 year old male   Provider:  Radha Pedro, RN   Department:  [facility]           INR as of 8/8/2018     Today's INR 2.13      Anticoagulation Summary as of 8/8/2018     INR goal 2.0-3.0   Today's INR 2.13   Full warfarin instructions 5 mg on Fri; 7.5 mg all other days   Next INR check 8/15/2018    Indications   LVAD (left ventricular assist device) present (H) [Z95.811]  Long-term (current) use of anticoagulants [Z79.01] [Z79.01]         August 2018 Details    Sun Mon Tue Wed Thu Fri Sat        1               2               3               4                 5               6               7               8      7.5 mg   See details      9      7.5 mg         10      5 mg         11      7.5 mg           12      7.5 mg         13      7.5 mg         14      7.5 mg         15            16               17               18                 19               20               21               22               23               24               25                 26               27               28               29               30               31                 Date Details   08/08 This INR check       Date of next INR:  8/15/2018         How to take your warfarin dose     To take:  5 mg Take 1 of the 5 mg tablets.    To take:  7.5 mg Take 1.5 of the 5 mg tablets.

## 2018-08-10 ENCOUNTER — INFUSION THERAPY VISIT (OUTPATIENT)
Dept: INFUSION THERAPY | Facility: CLINIC | Age: 63
End: 2018-08-10
Payer: COMMERCIAL

## 2018-08-10 VITALS
BODY MASS INDEX: 42.1 KG/M2 | RESPIRATION RATE: 18 BRPM | WEIGHT: 276.9 LBS | OXYGEN SATURATION: 95 % | HEART RATE: 96 BPM | TEMPERATURE: 98.1 F

## 2018-08-10 DIAGNOSIS — D50.9 IRON DEFICIENCY ANEMIA: Primary | ICD-10-CM

## 2018-08-10 DIAGNOSIS — I50.22 CHRONIC SYSTOLIC CONGESTIVE HEART FAILURE (H): ICD-10-CM

## 2018-08-10 PROCEDURE — 99207 ZZC NO CHARGE NURSE ONLY: CPT

## 2018-08-10 PROCEDURE — 96365 THER/PROPH/DIAG IV INF INIT: CPT | Performed by: NURSE PRACTITIONER

## 2018-08-10 RX ORDER — ACETAMINOPHEN 325 MG/1
650 TABLET ORAL
Status: CANCELLED
Start: 2018-08-10

## 2018-08-10 RX ORDER — DIPHENHYDRAMINE HCL 25 MG
25 CAPSULE ORAL
Status: COMPLETED | OUTPATIENT
Start: 2018-08-10 | End: 2018-08-10

## 2018-08-10 RX ORDER — DIPHENHYDRAMINE HCL 25 MG
25 CAPSULE ORAL
Status: CANCELLED | OUTPATIENT
Start: 2018-08-10

## 2018-08-10 RX ORDER — ACETAMINOPHEN 325 MG/1
650 TABLET ORAL
Status: DISCONTINUED | OUTPATIENT
Start: 2018-08-10 | End: 2018-08-10 | Stop reason: HOSPADM

## 2018-08-10 RX ADMIN — Medication 25 MG: at 08:55

## 2018-08-10 RX ADMIN — Medication 250 ML: at 09:05

## 2018-08-10 RX ADMIN — ACETAMINOPHEN 650 MG: 325 TABLET ORAL at 08:55

## 2018-08-10 ASSESSMENT — PAIN SCALES - GENERAL: PAINLEVEL: NO PAIN (0)

## 2018-08-10 NOTE — PROGRESS NOTES
Infusion Nursing Note:  Jim Willingham presents today for #2 of 5 ordered Venofer infusions.  Patient seen by provider today: No   present during visit today: Not Applicable.    Note: Pt states he is doing well, denies any problems with his 1st infusion, states he does feel less fatigued.  Will treat as ordered today.    Intravenous Access:  Peripheral IV placed.    Treatment Conditions:  Not Applicable.      Post Infusion Assessment:  Patient tolerated infusion without incident.  Blood return noted pre and post infusion.  Site patent and intact, free from redness, edema or discomfort.  No evidence of extravasations.  Access discontinued per protocol.    Discharge Plan:   Return on 08/16/18 for Venofer infusion #3.  Discharge instructions reviewed with: Patient.  Patient and/or family verbalized understanding of discharge instructions and all questions answered.  Patient discharged in stable condition accompanied by: self.  Departure Mode: Ambulatory.    Shweta Mccallum RN

## 2018-08-10 NOTE — MR AVS SNAPSHOT
After Visit Summary   8/10/2018    Jim Willingham    MRN: [id number]           Patient Information     Date Of Birth          1955        Visit Information        Provider Department      8/10/2018 9:00 AM BAY 2 INFUSION [facility]        Today's Diagnoses     Iron deficiency anemia    -  1    Chronic systolic congestive heart failure (H)           Follow-ups after your visit        Your next 10 appointments already scheduled     Aug 16, 2018 12:00 PM CDT   Level 1 with BAY 10 INFUSION   [facility] ([facility])    [address] [place] [address]   [phone]            Aug 22, 2018  8:00 AM CDT   Level 1 with BAY 1 INFUSION   [facility] ([facility])    [address] [place] [address]   [phone]            Aug 28, 2018  8:00 AM CDT   Level 1 with BAY 1 INFUSION   [facility] ([facility])    [address] [place] [address]   [phone]            Oct 24, 2018 11:30 AM CDT   Lab with UC LAB   [facility] ([facility])    [address] [place] [address]  [address] [facility] [address]   [phone]            Oct 24, 2018 12:00 PM CDT   (Arrive by 11:45 AM)   Ventricular Assist Device with Cate Marshall NP   [facility] ([facility])    [address] [place] [address]  [address] [place] [address]   [phone]            Oct 24, 2018  1:00 PM CDT   (Arrive by 12:45 PM)   Implanted Defibulator with Uc Cv Device 1   [facility] ([facility])    [address] [place] [address]  [address] [place] [address]   [phone]            Dec 07, 2018  9:30 AM CST   Lab with UC LAB   [facility] ([facility])    [address] [place] [address]  [address] [facility] [address]   [phone]            Dec 07, 2018 10:00 AM CST    (Arrive by 9:45 AM)   Implanted Defibulator with Uc Cv Device 1   [facility] ([facility])    [address]  [address]  [facility] [address] [phone]            Dec 07, 2018 10:30 AM CST   (Arrive by 10:15 AM)   Ventricular Assist Device with Delisa Montgomery MD   [facility] ([facility])    [address]  [address]  [facility] [address] [phone]              Who to contact     If you have questions or need follow up information about today's clinic visit or your schedule please contact [facility] directly at [phone].  Normal or non-critical lab and imaging results will be communicated to you by MyChart, letter or phone within 4 business days after the clinic has received the results. If you do not hear from us within 7 days, please contact the clinic through MyChart or phone. If you have a critical or abnormal lab result, we will notify you by phone as soon as possible.  Submit refill requests through [employer] or call your pharmacy and they will forward the refill request to us. Please allow 3 business days for your refill to be completed.          Additional Information About Your Visit        Care EveryWhere ID     This is your Care EveryWhere ID. This could be used by other organizations to access your [place] medical records  [id number]        Your Vitals Were     Pulse Temperature Respirations Pulse Oximetry BMI (Body Mass Index)       96 98.1  F (36.7  C) (Oral) 18 95% 42.1 kg/m2        Blood Pressure from Last 3 Encounters:   08/06/18 (!) 88/0   07/06/18 (!) 82/0   07/06/18 (!) 108/94    Weight from Last 3 Encounters:   08/10/18 125.6 kg (276 lb 14.4 oz)   08/06/18 123.3 kg (271 lb 12.8 oz)   08/01/18 125.2 kg (276 lb)              Today, you had the following     No orders found for display       Primary Care Provider Office Phone # Fax #    Jovany Salas MD  [phone]       [facility] [address] [facility] [address]  [facility] [address]        Equal Access to Services     JERROD VELASQUEZ : Hadii aad ku hadnatanael Gastelum, wakerrida luqadonis, qaloidata kaorlandoda neo, kendra oseasin hayaan jaidamichael del angel jimmy ely. So [facility] [phone].    ATENCIÓN: Si habla español, tiene a roger disposición servicios gratuitos de asistencia lingüística. Llame al [phone].    We comply with applicable federal civil rights laws and [place] laws. We do not discriminate on the basis of race, color, national origin, age, disability, sex, sexual orientation, or gender identity.            Thank you!     Thank you for choosing [facility]  for your care. Our goal is always to provide you with excellent care. Hearing back from our patients is one way we can continue to improve our services. Please take a few minutes to complete the written survey that you may receive in the mail after your visit with us. Thank you!             Your Updated Medication List - Protect others around you: Learn how to safely use, store and throw away your medicines at www.disposemymeds.org.          This list is accurate as of 8/10/18 10:12 AM.  Always use your most recent med list.                   Brand Name Dispense Instructions for use Diagnosis    acetaminophen 325 MG tablet    TYLENOL    100 tablet    Take 2 tablets (650 mg) by mouth every 6 hours    Acute post-operative pain       ALDACTONE 25 MG tablet   Generic drug:  spironolactone     30 tablet    Take 1 tablet (25 mg) by mouth daily    LVAD (left ventricular assist device) present (H), Chronic systolic congestive heart failure (H), Nerve pain, Acute idiopathic gout, unspecified site       allopurinol 100 MG tablet    ZYLOPRIM    60 tablet    Take 1 tablet (100 mg) by mouth daily    Acute idiopathic gout, unspecified site       aspirin 81 MG chewable tablet     36 tablet    1 tablet (81 mg) by Oral or Feeding Tube route daily    LVAD  (left ventricular assist device) present (H)       BREO ELLIPTA 200-25 MCG/INH oral inhaler   Generic drug:  fluticasone-vilanterol      Inhale 1 puff into the lungs daily        bumetanide 1 MG tablet    BUMEX    120 tablet    Take 2 tablets (2 mg) by mouth 2 times daily    LVAD (left ventricular assist device) present (H)       celeXA 20 MG tablet   Generic drug:  citalopram      Take 20 mg by mouth daily        COZAAR 25 MG tablet   Generic drug:  losartan     90 tablet    Take 1.5 tablets (37.5 mg) by mouth daily    LVAD (left ventricular assist device) present (H)       cyanocobalamin 1000 MCG/ML injection    VITAMIN B12     Inject 1,000 mcg into the muscle every 30 days        gabapentin 300 MG capsule    NEURONTIN    90 capsule    Take 1 capsule (300 mg) by mouth 3 times daily    Nerve pain, LVAD (left ventricular assist device) present (H), Acute idiopathic gout, unspecified site, Chronic systolic congestive heart failure (H)       INCRUSE ELLIPTA 62.5 MCG/INH oral inhaler   Generic drug:  umeclidinium      Inhale 1 puff into the lungs daily        ipratropium - albuterol 0.5 mg/2.5 mg/3 mL 0.5-2.5 (3) MG/3ML neb solution    DUONEB     Take 3 mLs by nebulization every 4 hours as needed for shortness of breath / dyspnea or wheezing        metFORMIN 500 MG tablet    GLUCOPHAGE    60 tablet    Take 0.5 tablets (250 mg) by mouth 2 times daily (with meals)    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, without long-term current use of insulin, unspecified CKD stage (H)       metoprolol succinate 25 MG 24 hr tablet    TOPROL-XL    180 tablet    Take 1 tablet (25 mg) by mouth 2 times daily    Chronic systolic congestive heart failure (H), LVAD (left ventricular assist device) present (H)       pantoprazole 40 MG EC tablet    PROTONIX    60 tablet    Take 1 tablet (40 mg) by mouth every morning    Gastroesophageal reflux disease without esophagitis       polyethylene glycol Packet     MIRALAX/GLYCOLAX    7 packet    Take 17 g by mouth daily as needed for constipation (for no bm in 1 day)    Acute post-operative pain       potassium chloride 20 MEQ Packet    KLOR-CON    60 packet    Take 20 mEq by mouth daily    LVAD (left ventricular assist device) present (H), Chronic systolic congestive heart failure (H)       predniSONE 20 MG tablet    DELTASONE    20 tablet    Take 0.5 tablets (10 mg) by mouth daily    Acute idiopathic gout, unspecified site, LVAD (left ventricular assist device) present (H), Nerve pain, Chronic systolic congestive heart failure (H)       PROAIR  (90 Base) MCG/ACT Inhaler   Generic drug:  albuterol      Inhale 2 puffs into the lungs every 4 hours as needed for shortness of breath / dyspnea or wheezing        senna-docusate 8.6-50 MG per tablet    SENOKOT-S;PERICOLACE    100 tablet    Take 2 tablets by mouth 2 times daily as needed for constipation    Acute post-operative pain       SINGULAIR 10 MG tablet   Generic drug:  montelukast      Take 10 mg by mouth At Bedtime        traMADol 50 MG tablet    ULTRAM    28 tablet    Take 2 tablets (100 mg) by mouth every 6 hours as needed for moderate pain or breakthrough pain    Acute post-operative pain       * warfarin 3 MG tablet    COUMADIN    30 tablet    Use as directed.    LVAD (left ventricular assist device) present (H)       * warfarin 5 MG tablet    COUMADIN    75 tablet    Take 2-2.5 tablets daily or as directed by coumadin clinic.    LVAD (left ventricular assist device) present (H), Chronic systolic congestive heart failure (H)       zinc sulfate 220 (50 Zn) MG capsule    ZINCATE     Take 220 mg by mouth 2 times daily        * Notice:  This list has 2 medication(s) that are the same as other medications prescribed for you. Read the directions carefully, and ask your doctor or other care provider to review them with you.

## 2018-08-14 ENCOUNTER — ANTICOAGULATION THERAPY VISIT (OUTPATIENT)
Dept: ANTICOAGULATION | Facility: CLINIC | Age: 63
End: 2018-08-14

## 2018-08-14 DIAGNOSIS — I50.22 CHRONIC SYSTOLIC CONGESTIVE HEART FAILURE (H): Primary | ICD-10-CM

## 2018-08-14 DIAGNOSIS — Z79.01 LONG-TERM (CURRENT) USE OF ANTICOAGULANTS: ICD-10-CM

## 2018-08-14 DIAGNOSIS — Z95.811 LVAD (LEFT VENTRICULAR ASSIST DEVICE) PRESENT (H): ICD-10-CM

## 2018-08-14 LAB
ANION GAP SERPL CALCULATED.3IONS-SCNC: 6 MMOL/L (ref 3–14)
BUN SERPL-MCNC: 47 MG/DL (ref 7–30)
CALCIUM SERPL-MCNC: 8.7 MG/DL (ref 8.5–10.1)
CHLORIDE SERPL-SCNC: 104 MMOL/L (ref 94–109)
CO2 SERPL-SCNC: 30 MMOL/L (ref 20–32)
CREAT SERPL-MCNC: 1.44 MG/DL (ref 0.66–1.25)
GFR SERPL CREATININE-BSD FRML MDRD: 50 ML/MIN/1.7M2
GLUCOSE SERPL-MCNC: 88 MG/DL (ref 70–99)
INR PPP: 2.42 (ref 0.86–1.14)
LDH SERPL L TO P-CCNC: 425 U/L (ref 85–227)
POTASSIUM SERPL-SCNC: 4.2 MMOL/L (ref 3.4–5.3)
SODIUM SERPL-SCNC: 140 MMOL/L (ref 133–144)

## 2018-08-14 PROCEDURE — 80048 BASIC METABOLIC PNL TOTAL CA: CPT | Performed by: INTERNAL MEDICINE

## 2018-08-14 PROCEDURE — 85610 PROTHROMBIN TIME: CPT | Performed by: INTERNAL MEDICINE

## 2018-08-14 PROCEDURE — 36415 COLL VENOUS BLD VENIPUNCTURE: CPT | Performed by: INTERNAL MEDICINE

## 2018-08-14 PROCEDURE — 83615 LACTATE (LD) (LDH) ENZYME: CPT | Performed by: INTERNAL MEDICINE

## 2018-08-14 RX ORDER — METOLAZONE 2.5 MG/1
2.5 TABLET ORAL ONCE
Qty: 1 TABLET | Refills: 0 | Status: SHIPPED | OUTPATIENT
Start: 2018-08-14 | End: 2019-07-24 | Stop reason: DRUGHIGH

## 2018-08-14 NOTE — MR AVS SNAPSHOT
Jim Willingham   8/14/2018   Anticoagulation Therapy Visit    Description:  63 year old male   Provider:  Delisa Hillman, RN   Department:  [facility]           INR as of 8/14/2018     Today's INR 2.42      Anticoagulation Summary as of 8/14/2018     INR goal 2.0-3.0   Today's INR 2.42   Full warfarin instructions 5 mg on Fri; 7.5 mg all other days   Next INR check 8/28/2018    Indications   LVAD (left ventricular assist device) present (H) [Z95.811]  Long-term (current) use of anticoagulants [Z79.01] [Z79.01]         August 2018 Details    Sun Mon Tue Wed Thu Fri Sat        1               2               3               4                 5               6               7               8               9               10               11                 12               13               14      7.5 mg   See details      15      7.5 mg         16      7.5 mg         17      5 mg         18      7.5 mg           19      7.5 mg         20      7.5 mg         21      7.5 mg         22      7.5 mg         23      7.5 mg         24      5 mg         25      7.5 mg           26      7.5 mg         27      7.5 mg         28            29               30               31                 Date Details   08/14 This INR check       Date of next INR:  8/28/2018         How to take your warfarin dose     To take:  5 mg Take 1 of the 5 mg tablets.    To take:  7.5 mg Take 1.5 of the 5 mg tablets.

## 2018-08-14 NOTE — PROGRESS NOTES
ANTICOAGULATION FOLLOW-UP CLINIC VISIT    Patient Name:  Jim Willingham  Date:  8/14/2018  Contact Type:  Telephone    SUBJECTIVE:     Patient Findings     Positives No Problem Findings           OBJECTIVE    INR   Date Value Ref Range Status   08/14/2018 2.42 (H) 0.86 - 1.14 Final       ASSESSMENT / PLAN  INR assessment THER    Recheck INR In: 2 WEEKS    INR Location Clinic      Anticoagulation Summary as of 8/14/2018     INR goal 2.0-3.0   Today's INR 2.42   Warfarin maintenance plan 5 mg (5 mg x 1) on Fri; 7.5 mg (5 mg x 1.5) all other days   Full warfarin instructions 5 mg on Fri; 7.5 mg all other days   Weekly warfarin total 50 mg   Plan last modified Maru Alonso RN (6/4/2018)   Next INR check 8/28/2018   Priority INR   Target end date     Indications   LVAD (left ventricular assist device) present (H) [Z95.811]  Long-term (current) use of anticoagulants [Z79.01] [Z79.01]         Anticoagulation Episode Summary     INR check location     Preferred lab     Send INR reminders to [facility]    Comments HIPPA Forms mailed 6/26/17  Labs drawn either at [facility] or [facility]      Anticoagulation Care Providers     Provider Role Specialty Phone number    Delisa Montgomery MD Responsible Cardiology [phone]            See the Encounter Report to view Anticoagulation Flowsheet and Dosing Calendar (Go to Encounters tab in chart review, and find the Anticoagulation Therapy Visit)  Spoke with patient.    Delisa Hillman RN

## 2018-08-15 DIAGNOSIS — I50.22 CHRONIC SYSTOLIC CONGESTIVE HEART FAILURE (H): ICD-10-CM

## 2018-08-15 DIAGNOSIS — Z95.811 LVAD (LEFT VENTRICULAR ASSIST DEVICE) PRESENT (H): ICD-10-CM

## 2018-08-16 ENCOUNTER — INFUSION THERAPY VISIT (OUTPATIENT)
Dept: INFUSION THERAPY | Facility: CLINIC | Age: 63
End: 2018-08-16
Payer: COMMERCIAL

## 2018-08-16 VITALS
TEMPERATURE: 98.1 F | BODY MASS INDEX: 42.45 KG/M2 | DIASTOLIC BLOOD PRESSURE: 87 MMHG | HEART RATE: 76 BPM | WEIGHT: 279.2 LBS | RESPIRATION RATE: 16 BRPM | OXYGEN SATURATION: 99 % | SYSTOLIC BLOOD PRESSURE: 125 MMHG

## 2018-08-16 DIAGNOSIS — D50.9 IRON DEFICIENCY ANEMIA: Primary | ICD-10-CM

## 2018-08-16 DIAGNOSIS — I50.22 CHRONIC SYSTOLIC CONGESTIVE HEART FAILURE (H): ICD-10-CM

## 2018-08-16 PROCEDURE — 96365 THER/PROPH/DIAG IV INF INIT: CPT | Performed by: INTERNAL MEDICINE

## 2018-08-16 PROCEDURE — 99207 ZZC NO CHARGE NURSE ONLY: CPT

## 2018-08-16 RX ORDER — ACETAMINOPHEN 325 MG/1
650 TABLET ORAL
Status: CANCELLED
Start: 2018-08-16

## 2018-08-16 RX ORDER — ACETAMINOPHEN 325 MG/1
650 TABLET ORAL
Status: DISCONTINUED | OUTPATIENT
Start: 2018-08-16 | End: 2018-08-16 | Stop reason: HOSPADM

## 2018-08-16 RX ORDER — DIPHENHYDRAMINE HCL 25 MG
25 CAPSULE ORAL
Status: COMPLETED | OUTPATIENT
Start: 2018-08-16 | End: 2018-08-16

## 2018-08-16 RX ORDER — DIPHENHYDRAMINE HCL 25 MG
25 CAPSULE ORAL
Status: CANCELLED | OUTPATIENT
Start: 2018-08-16

## 2018-08-16 RX ADMIN — Medication 25 MG: at 12:22

## 2018-08-16 RX ADMIN — ACETAMINOPHEN 650 MG: 325 TABLET ORAL at 12:22

## 2018-08-16 RX ADMIN — Medication 250 ML: at 12:25

## 2018-08-16 ASSESSMENT — PAIN SCALES - GENERAL: PAINLEVEL: NO PAIN (0)

## 2018-08-16 NOTE — MR AVS SNAPSHOT
After Visit Summary   8/16/2018    Jim Willingham    MRN: [id number]           Patient Information     Date Of Birth          1955        Visit Information        Provider Department      8/16/2018 12:00 PM BAY 10 INFUSION [facility]        Today's Diagnoses     Iron deficiency anemia    -  1    Chronic systolic congestive heart failure (H)           Follow-ups after your visit        Your next 10 appointments already scheduled     Aug 22, 2018  8:00 AM CDT   Level 1 with [place] 1 INFUSION   [facility] ([facility])    [address] [facility] [address]   [phone]            Aug 28, 2018  8:00 AM CDT   Level 1 with [place] 1 INFUSION   [facility] ([facility])    [address] [place] [address]   [phone]            Oct 24, 2018 11:30 AM CDT   Lab with UC LAB   [facility] ([facility])    [address]  1st Floor  [facility] [address] [phone]            Oct 24, 2018 12:00 PM CDT   (Arrive by 11:45 AM)   Ventricular Assist Device with Cate Marshall NP   [facility] ([facility])    [address]  [address] [place] [address] [phone]            Oct 24, 2018  1:00 PM CDT   (Arrive by 12:45 PM)   Implanted Defibulator with Uc Cv Device 1   [facility] ([facility])    [address]  [address] [place] [address] [phone]            Dec 07, 2018  9:30 AM CST   Lab with UC LAB    [facility]    [address]  1st Floor  [facility] [address]   [phone]            Dec 07, 2018 10:00 AM CST   (Arrive by 9:45 AM)   Implanted Defibulator with Uc Cv Device 1   [facility] ([facility])    [address] [place] [address]  [address] [place] [address]    [phone]            Dec 07, 2018 10:30 AM CST   (Arrive by 10:15 AM)   Ventricular Assist Device with Delisa Montgomery MD   [facility] ([facility])    [address]  [address] [place] [address] [phone]              Who to contact     If you have questions or need follow up information about today's clinic visit or your schedule please contact [facility] directly at [phone].  Normal or non-critical lab and imaging results will be communicated to you by MyChart, letter or phone within 4 business days after the clinic has received the results. If you do not hear from us within 7 days, please contact the clinic through MyChart or phone. If you have a critical or abnormal lab result, we will notify you by phone as soon as possible.  Submit refill requests through [employer] â[employer] or call your pharmacy and they will forward the refill request to us. Please allow 3 business days for your refill to be completed.          Additional Information About Your Visit        Care EveryWhere ID     This is your Care EveryWhere ID. This could be used by other organizations to access your [place] medical records  [id number]        Your Vitals Were     Pulse Temperature Respirations Pulse Oximetry BMI (Body Mass Index)       76 98.1  F (36.7  C) (Oral) 16 99% 42.45 kg/m2        Blood Pressure from Last 3 Encounters:   08/16/18 125/87   08/06/18 (!) 88/0   07/06/18 (!) 82/0    Weight from Last 3 Encounters:   08/16/18 126.6 kg (279 lb 3.2 oz)   08/10/18 125.6 kg (276 lb 14.4 oz)   08/06/18 123.3 kg (271 lb 12.8 oz)              Today, you had the following     No orders found for display       Primary Care Provider Office Phone # Fax #    Jovany Salas [phone]       [address] [address]        Equal Access to Services     JERROD VELASQUEZ AH: gatito Ford,  sophie palm jaidakendra leonaan ah. So [facility] [phone].    ATENCIÓN: Si yrn garcia, tiene a roger disposición servicios gratuitos de asistencia lingüística. Guerline al [phone].    We comply with applicable federal civil rights laws and [place] laws. We do not discriminate on the basis of race, color, national origin, age, disability, sex, sexual orientation, or gender identity.            Thank you!     Thank you for choosing [facility]  for your care. Our goal is always to provide you with excellent care. Hearing back from our patients is one way we can continue to improve our services. Please take a few minutes to complete the written survey that you may receive in the mail after your visit with us. Thank you!             Your Updated Medication List - Protect others around you: Learn how to safely use, store and throw away your medicines at www.disposemymeds.org.          This list is accurate as of 8/16/18  1:23 PM.  Always use your most recent med list.                   Brand Name Dispense Instructions for use Diagnosis    acetaminophen 325 MG tablet    TYLENOL    100 tablet    Take 2 tablets (650 mg) by mouth every 6 hours    Acute post-operative pain       ALDACTONE 25 MG tablet   Generic drug:  spironolactone     30 tablet    Take 1 tablet (25 mg) by mouth daily    LVAD (left ventricular assist device) present (H), Chronic systolic congestive heart failure (H), Nerve pain, Acute idiopathic gout, unspecified site       allopurinol 100 MG tablet    ZYLOPRIM    60 tablet    Take 1 tablet (100 mg) by mouth daily    Acute idiopathic gout, unspecified site       aspirin 81 MG chewable tablet     36 tablet    1 tablet (81 mg) by Oral or Feeding Tube route daily    LVAD (left ventricular assist device) present (H)       BREO ELLIPTA 200-25 MCG/INH inhaler   Generic drug:  fluticasone-vilanterol      Inhale 1 puff into the lungs daily        bumetanide 1 MG  tablet    BUMEX    120 tablet    Take 2 tablets (2 mg) by mouth 2 times daily    LVAD (left ventricular assist device) present (H)       celeXA 20 MG tablet   Generic drug:  citalopram      Take 20 mg by mouth daily        COZAAR 25 MG tablet   Generic drug:  losartan     90 tablet    Take 1.5 tablets (37.5 mg) by mouth daily    LVAD (left ventricular assist device) present (H)       cyanocobalamin 1000 MCG/ML injection    VITAMIN B12     Inject 1,000 mcg into the muscle every 30 days        gabapentin 300 MG capsule    NEURONTIN    90 capsule    Take 1 capsule (300 mg) by mouth 3 times daily    Nerve pain, LVAD (left ventricular assist device) present (H), Acute idiopathic gout, unspecified site, Chronic systolic congestive heart failure (H)       INCRUSE ELLIPTA 62.5 MCG/INH inhaler   Generic drug:  umeclidinium      Inhale 1 puff into the lungs daily        ipratropium - albuterol 0.5 mg/2.5 mg/3 mL 0.5-2.5 (3) MG/3ML neb solution    DUONEB     Take 3 mLs by nebulization every 4 hours as needed for shortness of breath / dyspnea or wheezing        metFORMIN 500 MG tablet    GLUCOPHAGE    60 tablet    Take 0.5 tablets (250 mg) by mouth 2 times daily (with meals)    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, without long-term current use of insulin, unspecified CKD stage (H)       metolazone 2.5 MG tablet    ZAROXOLYN    1 tablet    Take 1 tablet (2.5 mg) by mouth once for 1 dose    Chronic systolic congestive heart failure (H), LVAD (left ventricular assist device) present (H)       metoprolol succinate 25 MG 24 hr tablet    TOPROL-XL    180 tablet    Take 1 tablet (25 mg) by mouth 2 times daily    Chronic systolic congestive heart failure (H), LVAD (left ventricular assist device) present (H)       pantoprazole 40 MG EC tablet    PROTONIX    60 tablet    Take 1 tablet (40 mg) by mouth every morning    Gastroesophageal reflux disease without esophagitis       polyethylene glycol  Packet    MIRALAX/GLYCOLAX    7 packet    Take 17 g by mouth daily as needed for constipation (for no bm in 1 day)    Acute post-operative pain       potassium chloride 20 MEQ Packet    KLOR-CON    60 packet    Take 20 mEq by mouth daily    LVAD (left ventricular assist device) present (H), Chronic systolic congestive heart failure (H)       predniSONE 20 MG tablet    DELTASONE    20 tablet    Take 0.5 tablets (10 mg) by mouth daily    Acute idiopathic gout, unspecified site, LVAD (left ventricular assist device) present (H), Nerve pain, Chronic systolic congestive heart failure (H)       PROAIR  (90 Base) MCG/ACT inhaler   Generic drug:  albuterol      Inhale 2 puffs into the lungs every 4 hours as needed for shortness of breath / dyspnea or wheezing        senna-docusate 8.6-50 MG per tablet    SENOKOT-S;PERICOLACE    100 tablet    Take 2 tablets by mouth 2 times daily as needed for constipation    Acute post-operative pain       SINGULAIR 10 MG tablet   Generic drug:  montelukast      Take 10 mg by mouth At Bedtime        traMADol 50 MG tablet    ULTRAM    28 tablet    Take 2 tablets (100 mg) by mouth every 6 hours as needed for moderate pain or breakthrough pain    Acute post-operative pain       * warfarin 3 MG tablet    COUMADIN    30 tablet    Use as directed.    LVAD (left ventricular assist device) present (H)       * warfarin 5 MG tablet    COUMADIN    75 tablet    Take 2-2.5 tablets daily or as directed by coumadin clinic.    LVAD (left ventricular assist device) present (H), Chronic systolic congestive heart failure (H)       zinc sulfate 220 (50 Zn) MG capsule    ZINCATE     Take 220 mg by mouth 2 times daily        * Notice:  This list has 2 medication(s) that are the same as other medications prescribed for you. Read the directions carefully, and ask your doctor or other care provider to review them with you.

## 2018-08-16 NOTE — PROGRESS NOTES
Infusion Nursing Note:  Jim Willingham presents today for Venofer 3/5.    Patient seen by provider today: No   present during visit today: Not Applicable.    Note: Reports increased energy.    Intravenous Access:  Peripheral IV placed.    Treatment Conditions:  Not Applicable.      Post Infusion Assessment:  Patient tolerated infusion without incident.  Blood return noted pre and post infusion.  Site patent and intact, free from redness, edema or discomfort.  No evidence of extravasations.  Access discontinued per protocol.    Discharge Plan:   Patient will return 8/22/18 for next appointment.   Patient discharged in stable condition accompanied by: granddaughter.  Departure Mode: Ambulatory.    Sheri Rogers RN

## 2018-08-17 RX ORDER — METOLAZONE 2.5 MG/1
2.5 TABLET ORAL ONCE
Qty: 1 TABLET | Refills: 0 | OUTPATIENT
Start: 2018-08-17 | End: 2018-08-17

## 2018-08-22 ENCOUNTER — CARE COORDINATION (OUTPATIENT)
Dept: CARDIOLOGY | Facility: CLINIC | Age: 63
End: 2018-08-22

## 2018-08-22 ENCOUNTER — INFUSION THERAPY VISIT (OUTPATIENT)
Dept: INFUSION THERAPY | Facility: CLINIC | Age: 63
End: 2018-08-22
Payer: COMMERCIAL

## 2018-08-22 VITALS — HEART RATE: 70 BPM | OXYGEN SATURATION: 100 % | TEMPERATURE: 97.7 F

## 2018-08-22 DIAGNOSIS — Z95.811 LVAD (LEFT VENTRICULAR ASSIST DEVICE) PRESENT (H): ICD-10-CM

## 2018-08-22 DIAGNOSIS — I50.22 CHRONIC SYSTOLIC CONGESTIVE HEART FAILURE (H): ICD-10-CM

## 2018-08-22 DIAGNOSIS — D50.9 IRON DEFICIENCY ANEMIA: Primary | ICD-10-CM

## 2018-08-22 LAB
ANION GAP SERPL CALCULATED.3IONS-SCNC: 6 MMOL/L (ref 3–14)
BUN SERPL-MCNC: 56 MG/DL (ref 7–30)
CALCIUM SERPL-MCNC: 8.3 MG/DL (ref 8.5–10.1)
CHLORIDE SERPL-SCNC: 100 MMOL/L (ref 94–109)
CO2 SERPL-SCNC: 31 MMOL/L (ref 20–32)
CREAT SERPL-MCNC: 1.3 MG/DL (ref 0.66–1.25)
GFR SERPL CREATININE-BSD FRML MDRD: 56 ML/MIN/1.7M2
GLUCOSE SERPL-MCNC: 154 MG/DL (ref 70–99)
LDH SERPL L TO P-CCNC: 454 U/L (ref 85–227)
POTASSIUM SERPL-SCNC: 3.9 MMOL/L (ref 3.4–5.3)
SODIUM SERPL-SCNC: 137 MMOL/L (ref 133–144)

## 2018-08-22 PROCEDURE — 99207 ZZC NO CHARGE LOS: CPT

## 2018-08-22 PROCEDURE — 80048 BASIC METABOLIC PNL TOTAL CA: CPT | Performed by: NURSE PRACTITIONER

## 2018-08-22 PROCEDURE — 36415 COLL VENOUS BLD VENIPUNCTURE: CPT | Performed by: NURSE PRACTITIONER

## 2018-08-22 PROCEDURE — 96365 THER/PROPH/DIAG IV INF INIT: CPT | Performed by: NURSE PRACTITIONER

## 2018-08-22 PROCEDURE — 83615 LACTATE (LD) (LDH) ENZYME: CPT | Performed by: NURSE PRACTITIONER

## 2018-08-22 RX ORDER — ACETAMINOPHEN 325 MG/1
650 TABLET ORAL
Status: CANCELLED
Start: 2018-08-22

## 2018-08-22 RX ORDER — DIPHENHYDRAMINE HCL 25 MG
25 CAPSULE ORAL
Status: CANCELLED | OUTPATIENT
Start: 2018-08-22

## 2018-08-22 RX ADMIN — Medication 250 ML: at 08:28

## 2018-08-22 ASSESSMENT — PAIN SCALES - GENERAL: PAINLEVEL: NO PAIN (0)

## 2018-08-22 NOTE — MR AVS SNAPSHOT
After Visit Summary   8/22/2018    Jim Willingham    MRN: [id number]           Patient Information     Date Of Birth          1955        Visit Information        Provider Department      8/22/2018 8:00 AM [address]        Today's Diagnoses     Iron deficiency anemia    -  1    Chronic systolic congestive heart failure (H)           Follow-ups after your visit        Your next 10 appointments already scheduled     Aug 22, 2018  9:20 AM CDT   LAB with LAB FIRST FLOOR [facility] ([facility])    [address] [place] [address]   [phone]           Please do not eat 10-12 hours before your appointment if you are coming in fasting for labs on lipids, cholesterol, or glucose (sugar). This does not apply to pregnant women. Water, hot tea and black coffee (with nothing added) are okay. Do not drink other fluids, diet soda or chew gum.            Aug 28, 2018  8:00 AM CDT   Level 1 with [address] ([facility])    [address] [place] [address]   [phone]            Oct 24, 2018 11:30 AM CDT   Lab with UC LAB   [facility] ([facility])    [address] [place] [address] Se  1st Floor  [facility] [id number] [phone]            Oct 24, 2018 12:00 PM CDT   (Arrive by 11:45 AM)   Ventricular Assist Device with Cate Marshall NP   [facility] ([facility])    [address] [place] [address] Se  [address] [place] [address] [phone]            Oct 24, 2018  1:00 PM CDT   (Arrive by 12:45 PM)   Implanted Defibulator with Uc Cv Device 1   [facility] ([facility])    [address] [place] [address] [address]  [address] [place] [address] [phone]            Dec 07, 2018  9:30 AM CST   Lab with UC LAB   [facility] ([facility])    [address] [place] [address]  Street Se  1st Floor  [facility] [address]   [phone]            Dec 07, 2018 10:00 AM CST   (Arrive by 9:45 AM)   Implanted Defibulator with Uc Cv Device 1   [facility] ([facility])    [address] [facility] [address]  [address]  [facility] [address]   [phone]            Dec 07, 2018 10:30 AM CST   (Arrive by 10:15 AM)   Ventricular Assist Device with Delisa Montgomery MD   [facility])    [address] [place] [address]  [address] [place] [address]   [phone]              Who to contact     If you have questions or need follow up information about today's clinic visit or your schedule please contact [facility] directly at [phone].  Normal or non-critical lab and imaging results will be communicated to you by MyChart, letter or phone within 4 business days after the clinic has received the results. If you do not hear from us within 7 days, please contact the clinic through MyChart or phone. If you have a critical or abnormal lab result, we will notify you by phone as soon as possible.  Submit refill requests through [facility] or call your pharmacy and they will forward the refill request to us. Please allow 3 business days for your refill to be completed.          Additional Information About Your Visit        Care EveryWhere ID     This is your Care EveryWhere ID. This could be used by other organizations to access your [place] medical records  [id number]        Your Vitals Were     Pulse Temperature Pulse Oximetry             70 97.7  F (36.5  C) (Oral) 100%          Blood Pressure from Last 3 Encounters:   08/16/18 125/87   08/06/18 (!) 88/0   07/06/18 (!) 82/0    Weight from Last 3 Encounters:   08/16/18 126.6 kg (279 lb 3.2 oz)   08/10/18 125.6 kg (276 lb 14.4 oz)   08/06/18 123.3 kg (271 lb 12.8 oz)              Today, you had the following     No orders found for display       Primary Care  Provider Office Phone # Fax #    Jovany Salas [phone]       [address] [address]        Equal Access to Services     JERROD VELASQUEZ : Hadkhadar andrea ku gegeo Somadihaali, waaxda luqadaha, qaybta kaalmada adeegyada, waxwarren osunan lynnette del angel jimmy ely. So [facility] [phone].    ATENCIÓN: Si habla español, tiene a roger disposición servicios gratuitos de asistencia lingüística. Llame al [phone].    We comply with applicable federal civil rights laws and [place] laws. We do not discriminate on the basis of race, color, national origin, age, disability, sex, sexual orientation, or gender identity.            Thank you!     Thank you for choosing [facility]  for your care. Our goal is always to provide you with excellent care. Hearing back from our patients is one way we can continue to improve our services. Please take a few minutes to complete the written survey that you may receive in the mail after your visit with us. Thank you!             Your Updated Medication List - Protect others around you: Learn how to safely use, store and throw away your medicines at www.disposemymeds.org.          This list is accurate as of 8/22/18  9:01 AM.  Always use your most recent med list.                   Brand Name Dispense Instructions for use Diagnosis    acetaminophen 325 MG tablet    TYLENOL    100 tablet    Take 2 tablets (650 mg) by mouth every 6 hours    Acute post-operative pain       ALDACTONE 25 MG tablet   Generic drug:  spironolactone     30 tablet    Take 1 tablet (25 mg) by mouth daily    LVAD (left ventricular assist device) present (H), Chronic systolic congestive heart failure (H), Nerve pain, Acute idiopathic gout, unspecified site       allopurinol 100 MG tablet    ZYLOPRIM    60 tablet    Take 1 tablet (100 mg) by mouth daily    Acute idiopathic gout, unspecified site       aspirin 81 MG chewable tablet     36 tablet    1  tablet (81 mg) by Oral or Feeding Tube route daily    LVAD (left ventricular assist device) present (H)       BREO ELLIPTA 200-25 MCG/INH inhaler   Generic drug:  fluticasone-vilanterol      Inhale 1 puff into the lungs daily        bumetanide 1 MG tablet    BUMEX    120 tablet    Take 2 tablets (2 mg) by mouth 2 times daily    LVAD (left ventricular assist device) present (H)       celeXA 20 MG tablet   Generic drug:  citalopram      Take 20 mg by mouth daily        COZAAR 25 MG tablet   Generic drug:  losartan     90 tablet    Take 1.5 tablets (37.5 mg) by mouth daily    LVAD (left ventricular assist device) present (H)       cyanocobalamin 1000 MCG/ML injection    VITAMIN B12     Inject 1,000 mcg into the muscle every 30 days        gabapentin 300 MG capsule    NEURONTIN    90 capsule    Take 1 capsule (300 mg) by mouth 3 times daily    Nerve pain, LVAD (left ventricular assist device) present (H), Acute idiopathic gout, unspecified site, Chronic systolic congestive heart failure (H)       INCRUSE ELLIPTA 62.5 MCG/INH inhaler   Generic drug:  umeclidinium      Inhale 1 puff into the lungs daily        ipratropium - albuterol 0.5 mg/2.5 mg/3 mL 0.5-2.5 (3) MG/3ML neb solution    DUONEB     Take 3 mLs by nebulization every 4 hours as needed for shortness of breath / dyspnea or wheezing        metFORMIN 500 MG tablet    GLUCOPHAGE    60 tablet    Take 0.5 tablets (250 mg) by mouth 2 times daily (with meals)    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, without long-term current use of insulin, unspecified CKD stage (H)       metolazone 2.5 MG tablet    ZAROXOLYN    1 tablet    Take 1 tablet (2.5 mg) by mouth once for 1 dose    Chronic systolic congestive heart failure (H), LVAD (left ventricular assist device) present (H)       metoprolol succinate 25 MG 24 hr tablet    TOPROL-XL    180 tablet    Take 1 tablet (25 mg) by mouth 2 times daily    Chronic systolic congestive heart failure  (H), LVAD (left ventricular assist device) present (H)       pantoprazole 40 MG EC tablet    PROTONIX    60 tablet    Take 1 tablet (40 mg) by mouth every morning    Gastroesophageal reflux disease without esophagitis       polyethylene glycol Packet    MIRALAX/GLYCOLAX    7 packet    Take 17 g by mouth daily as needed for constipation (for no bm in 1 day)    Acute post-operative pain       potassium chloride 20 MEQ Packet    KLOR-CON    60 packet    Take 20 mEq by mouth daily    LVAD (left ventricular assist device) present (H), Chronic systolic congestive heart failure (H)       predniSONE 20 MG tablet    DELTASONE    20 tablet    Take 0.5 tablets (10 mg) by mouth daily    Acute idiopathic gout, unspecified site, LVAD (left ventricular assist device) present (H), Nerve pain, Chronic systolic congestive heart failure (H)       PROAIR  (90 Base) MCG/ACT inhaler   Generic drug:  albuterol      Inhale 2 puffs into the lungs every 4 hours as needed for shortness of breath / dyspnea or wheezing        senna-docusate 8.6-50 MG per tablet    SENOKOT-S;PERICOLACE    100 tablet    Take 2 tablets by mouth 2 times daily as needed for constipation    Acute post-operative pain       SINGULAIR 10 MG tablet   Generic drug:  montelukast      Take 10 mg by mouth At Bedtime        traMADol 50 MG tablet    ULTRAM    28 tablet    Take 2 tablets (100 mg) by mouth every 6 hours as needed for moderate pain or breakthrough pain    Acute post-operative pain       * warfarin 3 MG tablet    COUMADIN    30 tablet    Use as directed.    LVAD (left ventricular assist device) present (H)       * warfarin 5 MG tablet    COUMADIN    75 tablet    Take 2-2.5 tablets daily or as directed by coumadin clinic.    LVAD (left ventricular assist device) present (H), Chronic systolic congestive heart failure (H)       zinc sulfate 220 (50 Zn) MG capsule    ZINCATE     Take 220 mg by mouth 2 times daily        * Notice:  This list has 2 medication(s)  that are the same as other medications prescribed for you. Read the directions carefully, and ask your doctor or other care provider to review them with you.

## 2018-08-22 NOTE — PROGRESS NOTES
Called pt today to check-in regarding labs drawn today. Creatinine down to 1.3 from 1.44 one week ago. Pt had a one time dose of metolazone 2.5mg last week. Pt reports feeling quite well. Weight is 261lb (from approx 268lb last week). He has completed 4 of 5 iron infusions and is feeling increased energy and endurance. Noted on pt's labs that his LDH is starting to trend up, will discuss with Dr. Montgomery and cheko as outpatient.

## 2018-08-22 NOTE — PROGRESS NOTES
Infusion Nursing Note:  Cannon Beachoswald Willingham presents today for Venofer 4/5.    Patient seen by provider today: No   present during visit today: Not Applicable.    Note: N/A.    Intravenous Access:  Peripheral IV placed.    Treatment Conditions:  Not Applicable.    Post Infusion Assessment:  Patient tolerated infusion without incident.  Blood return noted pre and post infusion.  Site patent and intact, free from redness, edema or discomfort.  Access discontinued per protocol.    Discharge Plan:   Patient will return 8/28/18 for next appointment.  Patient discharged in stable condition accompanied by: self.  Departure Mode: Ambulatory.    Nery Pulido RN

## 2018-08-23 DIAGNOSIS — Z95.811 LVAD (LEFT VENTRICULAR ASSIST DEVICE) PRESENT (H): Primary | ICD-10-CM

## 2018-08-23 NOTE — PROGRESS NOTES
Discussed rising LDH with Dr. Montgomery. Will repeat lab next week. If elevated, will discuss admission. Called pt to discuss plan, left voicemail with updates.

## 2018-08-28 ENCOUNTER — ANTICOAGULATION THERAPY VISIT (OUTPATIENT)
Dept: ANTICOAGULATION | Facility: CLINIC | Age: 63
End: 2018-08-28

## 2018-08-28 ENCOUNTER — INFUSION THERAPY VISIT (OUTPATIENT)
Dept: INFUSION THERAPY | Facility: CLINIC | Age: 63
End: 2018-08-28
Payer: COMMERCIAL

## 2018-08-28 VITALS — BODY MASS INDEX: 42.53 KG/M2 | HEART RATE: 61 BPM | TEMPERATURE: 98.6 F | WEIGHT: 279.7 LBS | OXYGEN SATURATION: 97 %

## 2018-08-28 DIAGNOSIS — Z95.811 LVAD (LEFT VENTRICULAR ASSIST DEVICE) PRESENT (H): ICD-10-CM

## 2018-08-28 DIAGNOSIS — I50.22 CHRONIC SYSTOLIC CONGESTIVE HEART FAILURE (H): Primary | ICD-10-CM

## 2018-08-28 DIAGNOSIS — Z79.01 LONG-TERM (CURRENT) USE OF ANTICOAGULANTS: ICD-10-CM

## 2018-08-28 LAB
INR PPP: 2.6 (ref 0.86–1.14)
LDH SERPL L TO P-CCNC: 431 U/L (ref 85–227)

## 2018-08-28 PROCEDURE — 85610 PROTHROMBIN TIME: CPT | Performed by: INTERNAL MEDICINE

## 2018-08-28 PROCEDURE — 83615 LACTATE (LD) (LDH) ENZYME: CPT | Performed by: INTERNAL MEDICINE

## 2018-08-28 PROCEDURE — 99207 ZZC NO CHARGE NURSE ONLY: CPT

## 2018-08-28 PROCEDURE — 96365 THER/PROPH/DIAG IV INF INIT: CPT | Performed by: NURSE PRACTITIONER

## 2018-08-28 PROCEDURE — 36415 COLL VENOUS BLD VENIPUNCTURE: CPT | Performed by: INTERNAL MEDICINE

## 2018-08-28 RX ORDER — ACETAMINOPHEN 325 MG/1
650 TABLET ORAL
Status: CANCELLED
Start: 2018-08-28

## 2018-08-28 RX ORDER — DIPHENHYDRAMINE HCL 25 MG
25 CAPSULE ORAL
Status: CANCELLED | OUTPATIENT
Start: 2018-08-28

## 2018-08-28 RX ADMIN — Medication 250 ML: at 08:28

## 2018-08-28 ASSESSMENT — PAIN SCALES - GENERAL: PAINLEVEL: NO PAIN (0)

## 2018-08-28 NOTE — MR AVS SNAPSHOT
Jim Willingham   8/28/2018   Anticoagulation Therapy Visit    Description:  63 year old male   Provider:  Maged Bedolla, [facility]   Department:  [facility]           INR as of 8/28/2018     Today's INR 2.60      Anticoagulation Summary as of 8/28/2018     INR goal 2.0-3.0   Today's INR 2.60   Full warfarin instructions 5 mg on Fri; 7.5 mg all other days   Next INR check 9/11/2018    Indications   LVAD (left ventricular assist device) present (H) [Z95.811]  Long-term (current) use of anticoagulants [Z79.01] [Z79.01]         August 2018 Details    Sun Mon Tue Wed Thu Fri Sat        1               2               3               4                 5               6               7               8               9               10               11                 12               13               14               15               16               17               18                 19               20               21               22               23               24               25                 26               27               28      7.5 mg   See details      29      7.5 mg         30      7.5 mg         31      5 mg           Date Details   08/28 This INR check               How to take your warfarin dose     To take:  5 mg Take 1 of the 5 mg tablets.    To take:  7.5 mg Take 1.5 of the 5 mg tablets.           September 2018 Details    Sun Mon Tue Wed Thu Fri Sat           1      7.5 mg           2      7.5 mg         3      7.5 mg         4      7.5 mg         5      7.5 mg         6      7.5 mg         7      5 mg         8      7.5 mg           9      7.5 mg         10      7.5 mg         11            12               13               14               15                 16               17               18               19               20               21               22                 23               24               25               26               27               28               29                  30                      Date Details   No additional details    Date of next INR:  9/11/2018         How to take your warfarin dose     To take:  5 mg Take 1 of the 5 mg tablets.    To take:  7.5 mg Take 1.5 of the 5 mg tablets.

## 2018-08-28 NOTE — PROGRESS NOTES
ANTICOAGULATION FOLLOW-UP CLINIC VISIT    Patient Name:  Jim Willingham  Date:  8/28/2018  Contact Type:  Telephone    SUBJECTIVE:     Patient Findings     Positives No Problem Findings    Comments Patient had his last dose of Venafer 5/5 today (5thdose)           OBJECTIVE    INR   Date Value Ref Range Status   08/28/2018 2.60 (H) 0.86 - 1.14 Corrected     Comment:     NOTIFIED JARVIS APONTE RN @ 1009 8.28.18 OF RESULT CHANGE  CORRECTED ON 08/28 AT 1009: PREVIOUSLY REPORTED AS Test canceled   Lab order   entry error RECHECKING TEST, WILL PUT IN CORRECTED RESULT CORRECTED ON 08/28   AT 0956: PREVIOUSLY REPORTED AS 4.33         ASSESSMENT / PLAN  INR assessment THER    Recheck INR In: 2 WEEKS    INR Location Clinic      Anticoagulation Summary as of 8/28/2018     INR goal 2.0-3.0   Today's INR 2.60   Warfarin maintenance plan 5 mg (5 mg x 1) on Fri; 7.5 mg (5 mg x 1.5) all other days   Full warfarin instructions 5 mg on Fri; 7.5 mg all other days   Weekly warfarin total 50 mg   Plan last modified Maru Alonso RN (6/4/2018)   Next INR check 9/11/2018   Priority INR   Target end date     Indications   LVAD (left ventricular assist device) present (H) [Z95.811]  Long-term (current) use of anticoagulants [Z79.01] [Z79.01]         Anticoagulation Episode Summary     INR check location     Preferred lab     Send INR reminders to [facility]    Comments HIPPA Forms mailed 6/26/17  Labs drawn either at [facility] or [facility]      Anticoagulation Care Providers     Provider Role Specialty Phone number    Delisa Montgomery MD Responsible Cardiology [phone]            See the Encounter Report to view Anticoagulation Flowsheet and Dosing Calendar (Go to Encounters tab in chart review, and find the Anticoagulation Therapy Visit)    Spoke with patient. Gave them their lab results and new warfarin recommendation.  No changes in health, medication, or diet. No missed doses, no falls. No signs or  symptoms of bleed or clotting.      Maged Bedolla [facility]             9/11/18  Spoke to Jim, he will go into the clinic tomorrow for an INR.    Brendan Montoya RN

## 2018-08-28 NOTE — MR AVS SNAPSHOT
After Visit Summary   8/28/2018    Jim Willingham    MRN: [id number]           Patient Information     Date Of Birth          1955        Visit Information        Provider Department      8/28/2018 8:00 AM BAY 1 INFUSION [facility]        Today's Diagnoses     Chronic systolic congestive heart failure (H)    -  1       Follow-ups after your visit        Your next 10 appointments already scheduled     Aug 28, 2018  9:40 AM CDT   LAB with LAB FIRST FLOOR [facility] ([facility])    [address] [place] [id number] [phone]           Please do not eat 10-12 hours before your appointment if you are coming in fasting for labs on lipids, cholesterol, or glucose (sugar). This does not apply to pregnant women. Water, hot tea and black coffee (with nothing added) are okay. Do not drink other fluids, diet soda or chew gum.            Oct 24, 2018 11:30 AM CDT   Lab with UC LAB   [facility] ([facility])    [address] [place] [address]  [address] [facility] [address]   [phone]            Oct 24, 2018 12:00 PM CDT   (Arrive by 11:45 AM)   Ventricular Assist Device with Cate Marshall NP   [facility])    [address] [place] [address]  [address] [place] [id number]   [phone]            Oct 24, 2018  1:00 PM CDT   (Arrive by 12:45 PM)   Implanted Defibulator with Uc Cv Device [address] [place] [address] ([facility])    [address] [place] [address]  [address] [place] [id number]   [phone]            Dec 07, 2018  9:30 AM CST   Lab with UC LAB   [facility] ([facility])    [address] [place] [address]  [address] [facility] [address]   [phone]            Dec 07, 2018 10:00 AM CST   (Arrive by 9:45 AM)   Implanted Defibulator with Uc Cv Device [address] [place] [address] ([facility])     [address]  [address]  [facility] [id number] [phone]            Dec 07, 2018 10:30 AM CST   (Arrive by 10:15 AM)   Ventricular Assist Device with Delisa Montgomery MD   [facility] ([facility])    [address]  [address] [place] [id number] [phone]              Who to contact     If you have questions or need follow up information about today's clinic visit or your schedule please contact [facility] directly at [phone].  Normal or non-critical lab and imaging results will be communicated to you by MyChart, letter or phone within 4 business days after the clinic has received the results. If you do not hear from us within 7 days, please contact the clinic through MyChart or phone. If you have a critical or abnormal lab result, we will notify you by phone as soon as possible.  Submit refill requests through [employer] or call your pharmacy and they will forward the refill request to us. Please allow 3 business days for your refill to be completed.          Additional Information About Your Visit        Care EveryWhere ID     This is your Care EveryWhere ID. This could be used by other organizations to access your [place] medical records  [id number]        Your Vitals Were     Pulse Temperature Pulse Oximetry BMI (Body Mass Index)          61 98.6  F (37  C) (Oral) 97% 42.53 kg/m2         Blood Pressure from Last 3 Encounters:   08/16/18 125/87   08/06/18 (!) 88/0   07/06/18 (!) 82/0    Weight from Last 3 Encounters:   08/28/18 126.9 kg (279 lb 11.2 oz)   08/16/18 126.6 kg (279 lb 3.2 oz)   08/10/18 125.6 kg (276 lb 14.4 oz)              Today, you had the following     No orders found for display       Primary Care Provider Office Phone # Fax #    Jovany Salas [phone]       [address] [address]        Equal Access to Services     JERROD VELASQUEZ :  Hadii aad ku haddorotao Somadihaali, waaxda luqadaha, qaybta kaalmada neo, kendra oseasin hayaajen senamichael del angel labrittonjen solis. So [facility] [phone].    ATENCIÓN: Si yrn garcia, tiene a roger disposición servicios gratuitos de asistencia lingüística. Llame al [phone].    We comply with applicable federal civil rights laws and [place] laws. We do not discriminate on the basis of race, color, national origin, age, disability, sex, sexual orientation, or gender identity.            Thank you!     Thank you for choosing [facility]  for your care. Our goal is always to provide you with excellent care. Hearing back from our patients is one way we can continue to improve our services. Please take a few minutes to complete the written survey that you may receive in the mail after your visit with us. Thank you!             Your Updated Medication List - Protect others around you: Learn how to safely use, store and throw away your medicines at www.disposemymeds.org.          This list is accurate as of 8/28/18  9:05 AM.  Always use your most recent med list.                   Brand Name Dispense Instructions for use Diagnosis    acetaminophen 325 MG tablet    TYLENOL    100 tablet    Take 2 tablets (650 mg) by mouth every 6 hours    Acute post-operative pain       ALDACTONE 25 MG tablet   Generic drug:  spironolactone     30 tablet    Take 1 tablet (25 mg) by mouth daily    LVAD (left ventricular assist device) present (H), Chronic systolic congestive heart failure (H), Nerve pain, Acute idiopathic gout, unspecified site       allopurinol 100 MG tablet    ZYLOPRIM    60 tablet    Take 1 tablet (100 mg) by mouth daily    Acute idiopathic gout, unspecified site       aspirin 81 MG chewable tablet     36 tablet    1 tablet (81 mg) by Oral or Feeding Tube route daily    LVAD (left ventricular assist device) present (H)       BREO ELLIPTA 200-25 MCG/INH inhaler   Generic drug:  fluticasone-vilanterol      Inhale 1  puff into the lungs daily        bumetanide 1 MG tablet    BUMEX    120 tablet    Take 2 tablets (2 mg) by mouth 2 times daily    LVAD (left ventricular assist device) present (H)       celeXA 20 MG tablet   Generic drug:  citalopram      Take 20 mg by mouth daily        COZAAR 25 MG tablet   Generic drug:  losartan     90 tablet    Take 1.5 tablets (37.5 mg) by mouth daily    LVAD (left ventricular assist device) present (H)       cyanocobalamin 1000 MCG/ML injection    VITAMIN B12     Inject 1,000 mcg into the muscle every 30 days        gabapentin 300 MG capsule    NEURONTIN    90 capsule    Take 1 capsule (300 mg) by mouth 3 times daily    Nerve pain, LVAD (left ventricular assist device) present (H), Acute idiopathic gout, unspecified site, Chronic systolic congestive heart failure (H)       INCRUSE ELLIPTA 62.5 MCG/INH inhaler   Generic drug:  umeclidinium      Inhale 1 puff into the lungs daily        ipratropium - albuterol 0.5 mg/2.5 mg/3 mL 0.5-2.5 (3) MG/3ML neb solution    DUONEB     Take 3 mLs by nebulization every 4 hours as needed for shortness of breath / dyspnea or wheezing        metFORMIN 500 MG tablet    GLUCOPHAGE    60 tablet    Take 0.5 tablets (250 mg) by mouth 2 times daily (with meals)    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, without long-term current use of insulin, unspecified CKD stage (H)       metolazone 2.5 MG tablet    ZAROXOLYN    1 tablet    Take 1 tablet (2.5 mg) by mouth once for 1 dose    Chronic systolic congestive heart failure (H), LVAD (left ventricular assist device) present (H)       metoprolol succinate 25 MG 24 hr tablet    TOPROL-XL    180 tablet    Take 1 tablet (25 mg) by mouth 2 times daily    Chronic systolic congestive heart failure (H), LVAD (left ventricular assist device) present (H)       pantoprazole 40 MG EC tablet    PROTONIX    60 tablet    Take 1 tablet (40 mg) by mouth every morning    Gastroesophageal reflux disease  without esophagitis       polyethylene glycol Packet    MIRALAX/GLYCOLAX    7 packet    Take 17 g by mouth daily as needed for constipation (for no bm in 1 day)    Acute post-operative pain       potassium chloride 20 MEQ Packet    KLOR-CON    60 packet    Take 20 mEq by mouth daily    LVAD (left ventricular assist device) present (H), Chronic systolic congestive heart failure (H)       predniSONE 20 MG tablet    DELTASONE    20 tablet    Take 0.5 tablets (10 mg) by mouth daily    Acute idiopathic gout, unspecified site, LVAD (left ventricular assist device) present (H), Nerve pain, Chronic systolic congestive heart failure (H)       PROAIR  (90 Base) MCG/ACT inhaler   Generic drug:  albuterol      Inhale 2 puffs into the lungs every 4 hours as needed for shortness of breath / dyspnea or wheezing        senna-docusate 8.6-50 MG per tablet    SENOKOT-S;PERICOLACE    100 tablet    Take 2 tablets by mouth 2 times daily as needed for constipation    Acute post-operative pain       SINGULAIR 10 MG tablet   Generic drug:  montelukast      Take 10 mg by mouth At Bedtime        traMADol 50 MG tablet    ULTRAM    28 tablet    Take 2 tablets (100 mg) by mouth every 6 hours as needed for moderate pain or breakthrough pain    Acute post-operative pain       * warfarin 3 MG tablet    COUMADIN    30 tablet    Use as directed.    LVAD (left ventricular assist device) present (H)       * warfarin 5 MG tablet    COUMADIN    75 tablet    Take 2-2.5 tablets daily or as directed by coumadin clinic.    LVAD (left ventricular assist device) present (H), Chronic systolic congestive heart failure (H)       zinc sulfate 220 (50 Zn) MG capsule    ZINCATE     Take 220 mg by mouth 2 times daily        * Notice:  This list has 2 medication(s) that are the same as other medications prescribed for you. Read the directions carefully, and ask your doctor or other care provider to review them with you.

## 2018-08-28 NOTE — PROGRESS NOTES
Infusion Nursing Note:  Jim Willingham presents today for Venofer 5/5.    Patient seen by provider today: No   present during visit today: Not Applicable.    Note: Pt admits to feeling great, see flow sheet for assessment.    Intravenous Access:  Peripheral IV placed.    Treatment Conditions:  Not Applicable.      Post Infusion Assessment:  Patient tolerated infusion without incident.  Site patent and intact, free from redness, edema or discomfort.  Access discontinued per protocol.    Discharge Plan:   Patient discharged in stable condition accompanied by: self.  Departure Mode: Ambulatory.      Benson Salcido RN

## 2018-08-29 ENCOUNTER — CARE COORDINATION (OUTPATIENT)
Dept: CARDIOLOGY | Facility: CLINIC | Age: 63
End: 2018-08-29

## 2018-09-04 DIAGNOSIS — Z95.811 LVAD (LEFT VENTRICULAR ASSIST DEVICE) PRESENT (H): ICD-10-CM

## 2018-09-04 DIAGNOSIS — I50.22 CHRONIC SYSTOLIC CONGESTIVE HEART FAILURE (H): ICD-10-CM

## 2018-09-06 DIAGNOSIS — I50.22 CHRONIC SYSTOLIC CONGESTIVE HEART FAILURE (H): ICD-10-CM

## 2018-09-06 DIAGNOSIS — Z95.811 LVAD (LEFT VENTRICULAR ASSIST DEVICE) PRESENT (H): Primary | ICD-10-CM

## 2018-09-06 RX ORDER — POTASSIUM CHLORIDE 1.5 G/1.58G
20 POWDER, FOR SOLUTION ORAL DAILY
Qty: 60 PACKET | Refills: 5 | OUTPATIENT
Start: 2018-09-06

## 2018-09-06 RX ORDER — POTASSIUM CHLORIDE 750 MG/1
20 TABLET, EXTENDED RELEASE ORAL DAILY
Qty: 120 TABLET | Refills: 5 | Status: SHIPPED | OUTPATIENT
Start: 2018-09-06 | End: 2018-09-11

## 2018-09-06 NOTE — TELEPHONE ENCOUNTER
Discussed potassium dosing with pt. Pt reported that he would like to try taking 10mEq tabs (2 daily) instead of potassium powder packets.

## 2018-09-06 NOTE — PROGRESS NOTES
Pt had LDH drawn on 8/28 to monitor upward trend in value. Results 431, from 454 the previous week. Discussed result with pt, and that we will continue to monitor trend. Pt will have next lab draw in 2 weeks, on 9/11/18.

## 2018-09-11 DIAGNOSIS — I50.22 CHRONIC SYSTOLIC CONGESTIVE HEART FAILURE (H): ICD-10-CM

## 2018-09-11 DIAGNOSIS — Z95.811 LVAD (LEFT VENTRICULAR ASSIST DEVICE) PRESENT (H): ICD-10-CM

## 2018-09-12 ENCOUNTER — CARE COORDINATION (OUTPATIENT)
Dept: CARDIOLOGY | Facility: CLINIC | Age: 63
End: 2018-09-12

## 2018-09-12 ENCOUNTER — ANTICOAGULATION THERAPY VISIT (OUTPATIENT)
Dept: ANTICOAGULATION | Facility: CLINIC | Age: 63
End: 2018-09-12

## 2018-09-12 DIAGNOSIS — Z95.811 LVAD (LEFT VENTRICULAR ASSIST DEVICE) PRESENT (H): ICD-10-CM

## 2018-09-12 DIAGNOSIS — I50.22 CHRONIC SYSTOLIC CONGESTIVE HEART FAILURE (H): ICD-10-CM

## 2018-09-12 DIAGNOSIS — Z79.01 LONG-TERM (CURRENT) USE OF ANTICOAGULANTS: ICD-10-CM

## 2018-09-12 DIAGNOSIS — Z95.811 LVAD (LEFT VENTRICULAR ASSIST DEVICE) PRESENT (H): Primary | ICD-10-CM

## 2018-09-12 LAB
ANION GAP SERPL CALCULATED.3IONS-SCNC: 7 MMOL/L (ref 3–14)
BUN SERPL-MCNC: 59 MG/DL (ref 7–30)
CALCIUM SERPL-MCNC: 9 MG/DL (ref 8.5–10.1)
CHLORIDE SERPL-SCNC: 100 MMOL/L (ref 94–109)
CO2 SERPL-SCNC: 30 MMOL/L (ref 20–32)
CREAT SERPL-MCNC: 1.59 MG/DL (ref 0.66–1.25)
GFR SERPL CREATININE-BSD FRML MDRD: 44 ML/MIN/1.7M2
GLUCOSE SERPL-MCNC: 158 MG/DL (ref 70–99)
INR PPP: 2.13 (ref 0.86–1.14)
LDH SERPL L TO P-CCNC: 402 U/L (ref 85–227)
POTASSIUM SERPL-SCNC: 4.5 MMOL/L (ref 3.4–5.3)
SODIUM SERPL-SCNC: 137 MMOL/L (ref 133–144)

## 2018-09-12 PROCEDURE — 85610 PROTHROMBIN TIME: CPT | Performed by: INTERNAL MEDICINE

## 2018-09-12 PROCEDURE — 36415 COLL VENOUS BLD VENIPUNCTURE: CPT | Performed by: INTERNAL MEDICINE

## 2018-09-12 PROCEDURE — 83615 LACTATE (LD) (LDH) ENZYME: CPT | Performed by: INTERNAL MEDICINE

## 2018-09-12 PROCEDURE — 80048 BASIC METABOLIC PNL TOTAL CA: CPT | Performed by: INTERNAL MEDICINE

## 2018-09-12 NOTE — PROGRESS NOTES
Pt had labs drawn today to follow-up on slightly elevated LDH. LDH level downtrending, currently 402. Creatinine is slightly elevated to 1.59, from 1.3. Pt reports no changes in weight, still at 263lb and taking bumex 2mg BID. Relayed results to Dr. Montgomery. Will recheck creatinine in 2 weeks with next INR.

## 2018-09-12 NOTE — MR AVS SNAPSHOT
Jim Willingham   9/12/2018   Anticoagulation Therapy Visit    Description:  63 year old male   Provider:  Kirsty Bermudez, RN   Department:  [facility]           INR as of 9/12/2018     Today's INR 2.13      Anticoagulation Summary as of 9/12/2018     INR goal 2.0-3.0   Today's INR 2.13   Full warfarin instructions 5 mg on Fri; 7.5 mg all other days   Next INR check 9/26/2018    Indications   LVAD (left ventricular assist device) present (H) [Z95.811]  Long-term (current) use of anticoagulants [Z79.01] [Z79.01]         September 2018 Details    Sun Mon Tue Wed Thu Fri Sat           1                 2               3               4               5               6               7               8                 9               10               11               12      7.5 mg   See details      13      7.5 mg         14      5 mg         15      7.5 mg           16      7.5 mg         17      7.5 mg         18      7.5 mg         19      7.5 mg         20      7.5 mg         21      5 mg         22      7.5 mg           23      7.5 mg         24      7.5 mg         25      7.5 mg         26            27               28               29                 30                      Date Details   09/12 This INR check       Date of next INR:  9/26/2018         How to take your warfarin dose     To take:  5 mg Take 1 of the 5 mg tablets.    To take:  7.5 mg Take 1.5 of the 5 mg tablets.

## 2018-09-12 NOTE — PROGRESS NOTES
ANTICOAGULATION FOLLOW-UP CLINIC VISIT    Patient Name:  Jim Willingham  Date:  9/12/2018  Contact Type:  Telephone    SUBJECTIVE:     Patient Findings     Positives No Problem Findings           OBJECTIVE    INR   Date Value Ref Range Status   09/12/2018 2.13 (H) 0.86 - 1.14 Final       ASSESSMENT / PLAN  INR assessment THER    Recheck INR In: 2 WEEKS    INR Location Clinic      Anticoagulation Summary as of 9/12/2018     INR goal 2.0-3.0   Today's INR 2.13   Warfarin maintenance plan 5 mg (5 mg x 1) on Fri; 7.5 mg (5 mg x 1.5) all other days   Full warfarin instructions 5 mg on Fri; 7.5 mg all other days   Weekly warfarin total 50 mg   No change documented Kirsty Bermudez RN   Plan last modified Maru Alonso RN (6/4/2018)   Next INR check 9/26/2018   Priority INR   Target end date     Indications   LVAD (left ventricular assist device) present (H) [Z95.811]  Long-term (current) use of anticoagulants [Z79.01] [Z79.01]         Anticoagulation Episode Summary     INR check location     Preferred lab     Send INR reminders to [facility]    Comments HIPPA Forms mailed 6/26/17  Labs drawn either at [facility] or [facility]  LVAD placed 6/19/17   II  ASA 81 mg daily      Anticoagulation Care Providers     Provider Role Specialty Phone number    Delisa Montgomery MD [facility] Cardiology [phone]            See the Encounter Report to view Anticoagulation Flowsheet and Dosing Calendar (Go to Encounters tab in chart review, and find the Anticoagulation Therapy Visit)    Spoke with Jim.  He reports no changes in health, diet, medications.    Patient had LVAD placed on:   6/19/17   HM II  Patient's current Aspirin dose: 81 mg daily  LVAD Protocol followed: Yes   If Not Followed Explanation:  RIGOBERTO Bermudez, REGINALD

## 2018-09-13 RX ORDER — POTASSIUM CHLORIDE 750 MG/1
20 TABLET, EXTENDED RELEASE ORAL DAILY
Qty: 120 TABLET | Refills: 5 | Status: SHIPPED | OUTPATIENT
Start: 2018-09-13 | End: 2020-02-13

## 2018-09-26 ENCOUNTER — ANTICOAGULATION THERAPY VISIT (OUTPATIENT)
Dept: ANTICOAGULATION | Facility: CLINIC | Age: 63
End: 2018-09-26

## 2018-09-26 DIAGNOSIS — Z95.811 LVAD (LEFT VENTRICULAR ASSIST DEVICE) PRESENT (H): ICD-10-CM

## 2018-09-26 DIAGNOSIS — I50.22 CHRONIC SYSTOLIC CONGESTIVE HEART FAILURE (H): ICD-10-CM

## 2018-09-26 DIAGNOSIS — Z79.01 LONG-TERM (CURRENT) USE OF ANTICOAGULANTS: ICD-10-CM

## 2018-09-26 LAB
ANION GAP SERPL CALCULATED.3IONS-SCNC: 5 MMOL/L (ref 3–14)
BUN SERPL-MCNC: 38 MG/DL (ref 7–30)
CALCIUM SERPL-MCNC: 8.2 MG/DL (ref 8.5–10.1)
CHLORIDE SERPL-SCNC: 103 MMOL/L (ref 94–109)
CO2 SERPL-SCNC: 30 MMOL/L (ref 20–32)
CREAT SERPL-MCNC: 1.23 MG/DL (ref 0.66–1.25)
GFR SERPL CREATININE-BSD FRML MDRD: 60 ML/MIN/1.7M2
GLUCOSE SERPL-MCNC: 159 MG/DL (ref 70–99)
INR PPP: 1.82 (ref 0.86–1.14)
LDH SERPL L TO P-CCNC: 296 U/L (ref 85–227)
POTASSIUM SERPL-SCNC: 4.3 MMOL/L (ref 3.4–5.3)
SODIUM SERPL-SCNC: 138 MMOL/L (ref 133–144)

## 2018-09-26 PROCEDURE — 85610 PROTHROMBIN TIME: CPT | Performed by: INTERNAL MEDICINE

## 2018-09-26 PROCEDURE — 80048 BASIC METABOLIC PNL TOTAL CA: CPT | Performed by: INTERNAL MEDICINE

## 2018-09-26 PROCEDURE — 83615 LACTATE (LD) (LDH) ENZYME: CPT | Performed by: INTERNAL MEDICINE

## 2018-09-26 PROCEDURE — 36415 COLL VENOUS BLD VENIPUNCTURE: CPT | Performed by: INTERNAL MEDICINE

## 2018-09-26 NOTE — PROGRESS NOTES
ANTICOAGULATION FOLLOW-UP CLINIC VISIT    Patient Name:  Jim Willingham  Date:  9/26/2018  Contact Type:  Telephone    SUBJECTIVE:     Patient Findings     Positives Unexplained INR or factor level change    Comments Missed dose denied            OBJECTIVE    INR   Date Value Ref Range Status   09/26/2018 1.82 (H) 0.86 - 1.14 Final       ASSESSMENT / PLAN  INR assessment SUB    Recheck INR In: 2 DAYS    INR Location Clinic      Anticoagulation Summary as of 9/26/2018     INR goal 2.0-3.0   Today's INR 1.82!   Warfarin maintenance plan 5 mg (5 mg x 1) on Fri; 7.5 mg (5 mg x 1.5) all other days   Full warfarin instructions 9/26: 10 mg; Otherwise 5 mg on Fri; 7.5 mg all other days   Weekly warfarin total 50 mg   Plan last modified Maru Alonso RN (6/4/2018)   Next INR check 9/28/2018   Priority INR   Target end date     Indications   LVAD (left ventricular assist device) present (H) [Z95.811]  Long-term (current) use of anticoagulants [Z79.01] [Z79.01]         Anticoagulation Episode Summary     INR check location     Preferred lab     Send INR reminders to [facility]    Comments HIPPA Forms mailed 6/26/17  Labs drawn either at [facility] or [facility]  LVAD placed 6/19/17   II  ASA 81 mg daily      Anticoagulation Care Providers     Provider Role Specialty Phone number    Delisa Montgomery MD [facility] Cardiology [phone]            See the Encounter Report to view Anticoagulation Flowsheet and Dosing Calendar (Go to Encounters tab in chart review, and find the Anticoagulation Therapy Visit)    Spoke with patient. Gave them their lab results and new warfarin recommendation.  No changes in health, medication, or diet. No missed doses, no falls. No signs or symptoms of bleed or clotting.    Patient had LVAD placed on:   6/19/17  Patient's current Aspirin dose: increased to 325 mg daily until he is back in the goal range.   LVAD Protocol followed: yes  If Not Followed Explanation:  n/a      Suyapa Crawford RN

## 2018-09-26 NOTE — MR AVS SNAPSHOT
Jim Willingham   9/26/2018   Anticoagulation Therapy Visit    Description:  63 year old male   Provider:  Suyapa Crawford, RN   Department:  [facility]           INR as of 9/26/2018     Today's INR 1.82!      Anticoagulation Summary as of 9/26/2018     INR goal 2.0-3.0   Today's INR 1.82!   Full warfarin instructions 9/26: 10 mg; Otherwise 5 mg on Fri; 7.5 mg all other days   Next INR check 9/28/2018    Indications   LVAD (left ventricular assist device) present (H) [Z95.811]  Long-term (current) use of anticoagulants [Z79.01] [Z79.01]         September 2018 Details    Sun Mon Tue Wed Thu Fri Sat           1                 2               3               4               5               6               7               8                 9               10               11               12               13               14               15                 16               17               18               19               20               21               22                 23               24               25               26      10 mg   See details      27      7.5 mg         28            29 30                      Date Details   09/26 This INR check       Date of next INR:  9/28/2018         How to take your warfarin dose     To take:  5 mg Take 1 of the 5 mg tablets.    To take:  7.5 mg Take 1.5 of the 5 mg tablets.    To take:  10 mg Take 2 of the 5 mg tablets.

## 2018-09-28 ENCOUNTER — ANTICOAGULATION THERAPY VISIT (OUTPATIENT)
Dept: ANTICOAGULATION | Facility: CLINIC | Age: 63
End: 2018-09-28

## 2018-09-28 DIAGNOSIS — Z79.01 LONG-TERM (CURRENT) USE OF ANTICOAGULANTS: ICD-10-CM

## 2018-09-28 DIAGNOSIS — Z95.811 LVAD (LEFT VENTRICULAR ASSIST DEVICE) PRESENT (H): ICD-10-CM

## 2018-09-28 LAB — INR PPP: 2.07 (ref 0.86–1.14)

## 2018-09-28 PROCEDURE — 36415 COLL VENOUS BLD VENIPUNCTURE: CPT | Performed by: INTERNAL MEDICINE

## 2018-09-28 PROCEDURE — 85610 PROTHROMBIN TIME: CPT | Performed by: INTERNAL MEDICINE

## 2018-09-28 NOTE — MR AVS SNAPSHOT
Jim Willingham   9/28/2018   Anticoagulation Therapy Visit    Description:  63 year old male   Provider:  Suyapa Crawford, RN   Department:  [facility]           INR as of 9/28/2018     Today's INR 2.07      Anticoagulation Summary as of 9/28/2018     INR goal 2.0-3.0   Today's INR 2.07   Full warfarin instructions 9/28: 7.5 mg; Otherwise 5 mg on Fri; 7.5 mg all other days   Next INR check 10/5/2018    Indications   LVAD (left ventricular assist device) present (H) [Z95.811]  Long-term (current) use of anticoagulants [Z79.01] [Z79.01]         September 2018 Details    Sun Mon Tue Wed Thu Fri Sat           1                 2               3               4               5               6               7               8                 9               10               11               12               13               14               15                 16               17               18               19               20               21               22                 23               24               25               26               27               28      7.5 mg   See details      29      7.5 mg           30      7.5 mg                Date Details   09/28 This INR check               How to take your warfarin dose     To take:  7.5 mg Take 1.5 of the 5 mg tablets.           October 2018 Details    Sun Mon Tue Wed Thu Fri Sat      1      7.5 mg         2      7.5 mg         3      7.5 mg         4      7.5 mg         5            6                 7               8               9               10               11               12               13                 14               15               16               17               18               19               20                 21               22               23               24               25               26               27                 28               29               30               31                   Date Details   No additional  details    Date of next INR:  10/5/2018         How to take your warfarin dose     To take:  5 mg Take 1 of the 5 mg tablets.    To take:  7.5 mg Take 1.5 of the 5 mg tablets.

## 2018-09-28 NOTE — PROGRESS NOTES
ANTICOAGULATION FOLLOW-UP CLINIC VISIT    Patient Name:  Jim Willingham  Date:  9/28/2018  Contact Type:  Telephone    SUBJECTIVE:     Patient Findings     Positives No Problem Findings           OBJECTIVE    INR   Date Value Ref Range Status   09/28/2018 2.07 (H) 0.86 - 1.14 Final       ASSESSMENT / PLAN  INR assessment THER    Recheck INR In: 1 WEEK    INR Location Clinic      Anticoagulation Summary as of 9/28/2018     INR goal 2.0-3.0   Today's INR 2.07   Warfarin maintenance plan 5 mg (5 mg x 1) on Fri; 7.5 mg (5 mg x 1.5) all other days   Full warfarin instructions 9/28: 7.5 mg; Otherwise 5 mg on Fri; 7.5 mg all other days   Weekly warfarin total 50 mg   Plan last modified Maru Alonso RN (6/4/2018)   Next INR check 10/5/2018   Priority INR   Target end date     Indications   LVAD (left ventricular assist device) present (H) [Z95.811]  Long-term (current) use of anticoagulants [Z79.01] [Z79.01]         Anticoagulation Episode Summary     INR check location     Preferred lab     Send INR reminders to [facility]    Comments HIPPA Forms mailed 6/26/17  Labs drawn either at [facility] or [facility]  LVAD placed 6/19/17   II  ASA 81 mg daily      Anticoagulation Care Providers     Provider Role Specialty Phone number    Delisa Montgomery MD [facility] Cardiology [phone]            See the Encounter Report to view Anticoagulation Flowsheet and Dosing Calendar (Go to Encounters tab in chart review, and find the Anticoagulation Therapy Visit)    Spoke with patient. Gave them their lab results and new warfarin recommendation.  No changes in health, medication, or diet. No missed doses, no falls. No signs or symptoms of bleed or clotting.    Patient had LVAD placed on:   6/19/17  Patient's current Aspirin dose: decreased to 81 mg daily per LVAD protocol   LVAD Protocol followed: yes   If Not Followed Explanation:  n/a      Suyapa Crawford RN

## 2018-10-01 ENCOUNTER — CARE COORDINATION (OUTPATIENT)
Dept: CARDIOLOGY | Facility: CLINIC | Age: 63
End: 2018-10-01

## 2018-10-01 DIAGNOSIS — I50.22 CHRONIC SYSTOLIC CONGESTIVE HEART FAILURE (H): ICD-10-CM

## 2018-10-01 DIAGNOSIS — Z95.811 LVAD (LEFT VENTRICULAR ASSIST DEVICE) PRESENT (H): Primary | ICD-10-CM

## 2018-10-05 ENCOUNTER — ANTICOAGULATION THERAPY VISIT (OUTPATIENT)
Dept: ANTICOAGULATION | Facility: CLINIC | Age: 63
End: 2018-10-05

## 2018-10-05 DIAGNOSIS — I50.22 CHRONIC SYSTOLIC CONGESTIVE HEART FAILURE (H): ICD-10-CM

## 2018-10-05 DIAGNOSIS — Z79.01 LONG TERM CURRENT USE OF ANTICOAGULANT THERAPY: ICD-10-CM

## 2018-10-05 DIAGNOSIS — Z95.811 LVAD (LEFT VENTRICULAR ASSIST DEVICE) PRESENT (H): ICD-10-CM

## 2018-10-05 LAB
ANION GAP SERPL CALCULATED.3IONS-SCNC: 7 MMOL/L (ref 3–14)
BUN SERPL-MCNC: 37 MG/DL (ref 7–30)
CALCIUM SERPL-MCNC: 8.3 MG/DL (ref 8.5–10.1)
CHLORIDE SERPL-SCNC: 102 MMOL/L (ref 94–109)
CO2 SERPL-SCNC: 30 MMOL/L (ref 20–32)
CREAT SERPL-MCNC: 1.42 MG/DL (ref 0.66–1.25)
GFR SERPL CREATININE-BSD FRML MDRD: 51 ML/MIN/1.7M2
GLUCOSE SERPL-MCNC: 105 MG/DL (ref 70–99)
INR PPP: 2.99 (ref 0.86–1.14)
LDH SERPL L TO P-CCNC: 334 U/L (ref 85–227)
POTASSIUM SERPL-SCNC: 3.9 MMOL/L (ref 3.4–5.3)
SODIUM SERPL-SCNC: 139 MMOL/L (ref 133–144)

## 2018-10-05 PROCEDURE — 83615 LACTATE (LD) (LDH) ENZYME: CPT | Performed by: INTERNAL MEDICINE

## 2018-10-05 PROCEDURE — 85610 PROTHROMBIN TIME: CPT | Performed by: INTERNAL MEDICINE

## 2018-10-05 PROCEDURE — 36415 COLL VENOUS BLD VENIPUNCTURE: CPT | Performed by: INTERNAL MEDICINE

## 2018-10-05 PROCEDURE — 80048 BASIC METABOLIC PNL TOTAL CA: CPT | Performed by: INTERNAL MEDICINE

## 2018-10-05 NOTE — MR AVS SNAPSHOT
Jim Willingham   10/5/2018   Anticoagulation Therapy Visit    Description:  63 year old male   Provider:  Maru Alonso, RN   Department:  [facility]           INR as of 10/5/2018     Today's INR 2.99      Anticoagulation Summary as of 10/5/2018     INR goal 2.0-3.0   Today's INR 2.99   Full warfarin instructions 5 mg on Fri; 7.5 mg all other days   Next INR check 10/12/2018    Indications   LVAD (left ventricular assist device) present (H) [Z95.811]  Long-term (current) use of anticoagulants [Z79.01] [Z79.01]         October 2018 Details    Sun Mon Tue Wed Thu Fri Sat      1               2               3               4               5      5 mg   See details      6      7.5 mg           7      7.5 mg         8      7.5 mg         9      7.5 mg         10      7.5 mg         11      7.5 mg         12            13                 14               15               16               17               18               19               20                 21               22               23               24               25               26               27                 28               29               30               31                   Date Details   10/05 This INR check       Date of next INR:  10/12/2018         How to take your warfarin dose     To take:  5 mg Take 1 of the 5 mg tablets.    To take:  7.5 mg Take 1.5 of the 5 mg tablets.

## 2018-10-05 NOTE — PROGRESS NOTES
ANTICOAGULATION FOLLOW-UP CLINIC VISIT    Patient Name:  Jim Willingham  Date:  10/5/2018  Contact Type:  Telephone    SUBJECTIVE:     Patient Findings     Positives No Problem Findings           OBJECTIVE    INR   Date Value Ref Range Status   10/05/2018 2.99 (H) 0.86 - 1.14 Final       ASSESSMENT / PLAN  No question data found.  Anticoagulation Summary as of 10/5/2018     INR goal 2.0-3.0   Today's INR 2.99   Warfarin maintenance plan 5 mg (5 mg x 1) on Fri; 7.5 mg (5 mg x 1.5) all other days   Full warfarin instructions 5 mg on Fri; 7.5 mg all other days   Weekly warfarin total 50 mg   No change documented Maru Alonso RN   Plan last modified Maru Alonso RN (6/4/2018)   Next INR check 10/12/2018   Priority INR   Target end date     Indications   LVAD (left ventricular assist device) present (H) [Z95.811]  Long-term (current) use of anticoagulants [Z79.01] [Z79.01]         Anticoagulation Episode Summary     INR check location     Preferred lab     Send INR reminders to [facility]    Comments HIPPA Forms mailed 6/26/17  Labs drawn either at [facility] or [facility]  LVAD placed 6/19/17   II  ASA 81 mg daily      Anticoagulation Care Providers     Provider Role Specialty Phone number    Delisa Montgomery MD Responsible Cardiology [phone]            See the Encounter Report to view Anticoagulation Flowsheet and Dosing Calendar (Go to Encounters tab in chart review, and find the Anticoagulation Therapy Visit)    Spoke with Tatyana Alonso RN

## 2018-10-08 ENCOUNTER — CARE COORDINATION (OUTPATIENT)
Dept: CARDIOLOGY | Facility: CLINIC | Age: 63
End: 2018-10-08

## 2018-10-08 DIAGNOSIS — I50.22 CHRONIC SYSTOLIC CONGESTIVE HEART FAILURE (H): ICD-10-CM

## 2018-10-08 DIAGNOSIS — Z95.811 LVAD (LEFT VENTRICULAR ASSIST DEVICE) PRESENT (H): Primary | ICD-10-CM

## 2018-10-08 NOTE — PROGRESS NOTES
Called pt to review labs drawn 10/5/18. Labs are stable - pt continues to have slight fluctuations in creatinine and LDH, neither is concerning at this time. Will continue to trend/monitor [id number].

## 2018-10-08 NOTE — PROGRESS NOTES
10/1/18 - called pt to review labs drawn on 9/26/18. Pt reports feeling well, improved energy since completing iron infusions. No changes to weight or LVAD numbers. Will continue to trend labs approx q 2 weeks.

## 2018-10-12 ENCOUNTER — ANTICOAGULATION THERAPY VISIT (OUTPATIENT)
Dept: ANTICOAGULATION | Facility: CLINIC | Age: 63
End: 2018-10-12

## 2018-10-12 DIAGNOSIS — Z95.811 LVAD (LEFT VENTRICULAR ASSIST DEVICE) PRESENT (H): ICD-10-CM

## 2018-10-12 DIAGNOSIS — Z79.01 LONG TERM CURRENT USE OF ANTICOAGULANT THERAPY: ICD-10-CM

## 2018-10-12 LAB
INR BLD: NORMAL (ref 0.86–1.14)
INR PPP: 2.52 (ref 0.86–1.14)

## 2018-10-12 PROCEDURE — 36415 COLL VENOUS BLD VENIPUNCTURE: CPT | Performed by: INTERNAL MEDICINE

## 2018-10-12 PROCEDURE — 85610 PROTHROMBIN TIME: CPT | Performed by: INTERNAL MEDICINE

## 2018-10-12 NOTE — PROGRESS NOTES
Patient had LVAD placed on:   6/19/2017  Patient's current Aspirin dose: 81mg  LVAD Protocol followed: yes   If Not Followed Explanation:  n/a      ANTICOAGULATION FOLLOW-UP CLINIC VISIT    Patient Name:  Jim Willingham  Date:  10/12/2018  Contact Type:  Telephone    SUBJECTIVE:     Patient Findings     Positives Antibiotic use or infection (10/7-started Cefdinir X10 days.  Pt has not had effects from this med in the past.)           OBJECTIVE    INR   Date Value Ref Range Status   10/12/2018 2.52 (H) 0.86 - 1.14 Final     INR Point of Care   Date Value Ref Range Status   10/12/2018 Canceled, Test credited 0.86 - 1.14 Final     Comment:     Duplicate request       ASSESSMENT / PLAN  INR assessment THER    Recheck INR In: 1 WEEK    INR Location Clinic      Anticoagulation Summary as of 10/12/2018     INR goal 2.0-3.0   Today's INR 2.52   Warfarin maintenance plan 5 mg (5 mg x 1) on Fri; 7.5 mg (5 mg x 1.5) all other days   Full warfarin instructions 5 mg on Fri; 7.5 mg all other days   Weekly warfarin total 50 mg   Plan last modified Maru Alonso RN (6/4/2018)   Next INR check 10/19/2018   Priority INR   Target end date     Indications   LVAD (left ventricular assist device) present (H) [Z95.811]  Long-term (current) use of anticoagulants [Z79.01] [Z79.01]         Anticoagulation Episode Summary     INR check location     Preferred lab     Send INR reminders to [facility]    Comments HIPPA Forms mailed 6/26/17  Labs drawn either at [facility] or [facility]  LVAD placed 6/19/17   II  ASA 81 mg daily      Anticoagulation Care Providers     Provider Role Specialty Phone number    Delisa Montgomery MD Responsible Cardiology [phone]            See the Encounter Report to view Anticoagulation Flowsheet and Dosing Calendar (Go to Encounters tab in chart review, and find the Anticoagulation Therapy Visit)  Spoke with patient.  Delisa Hillman RN

## 2018-10-12 NOTE — MR AVS SNAPSHOT
Jim Willingham   10/12/2018   Anticoagulation Therapy Visit    Description:  63 year old male   Provider:  Delisa Hillman, RN   Department:  [facility]           INR as of 10/12/2018     Today's INR 2.52      Anticoagulation Summary as of 10/12/2018     INR goal 2.0-3.0   Today's INR 2.52   Full warfarin instructions 5 mg on Fri; 7.5 mg all other days   Next INR check 10/19/2018    Indications   LVAD (left ventricular assist device) present (H) [Z95.811]  Long-term (current) use of anticoagulants [Z79.01] [Z79.01]         October 2018 Details    Sun Mon Tue Wed Thu Fri Sat      1               2               3               4               5               6                 7               8               9               10               11               12      5 mg   See details      13      7.5 mg           14      7.5 mg         15      7.5 mg         16      7.5 mg         17      7.5 mg         18      7.5 mg         19            20                 21               22               23               24               25               26               27                 28               29               30               31                   Date Details   10/12 This INR check       Date of next INR:  10/19/2018         How to take your warfarin dose     To take:  5 mg Take 1 of the 5 mg tablets.    To take:  7.5 mg Take 1.5 of the 5 mg tablets.

## 2018-10-18 DIAGNOSIS — Z95.811 LVAD (LEFT VENTRICULAR ASSIST DEVICE) PRESENT (H): Primary | ICD-10-CM

## 2018-10-18 DIAGNOSIS — I50.22 CHRONIC SYSTOLIC CONGESTIVE HEART FAILURE (H): ICD-10-CM

## 2018-10-19 ENCOUNTER — ANTICOAGULATION THERAPY VISIT (OUTPATIENT)
Dept: ANTICOAGULATION | Facility: CLINIC | Age: 63
End: 2018-10-19

## 2018-10-19 DIAGNOSIS — Z95.811 LVAD (LEFT VENTRICULAR ASSIST DEVICE) PRESENT (H): ICD-10-CM

## 2018-10-19 DIAGNOSIS — Z79.01 LONG TERM CURRENT USE OF ANTICOAGULANT THERAPY: ICD-10-CM

## 2018-10-19 DIAGNOSIS — I50.22 CHRONIC SYSTOLIC CONGESTIVE HEART FAILURE (H): ICD-10-CM

## 2018-10-19 LAB
ANION GAP SERPL CALCULATED.3IONS-SCNC: 4 MMOL/L (ref 3–14)
BUN SERPL-MCNC: 41 MG/DL (ref 7–30)
CALCIUM SERPL-MCNC: 8.6 MG/DL (ref 8.5–10.1)
CHLORIDE SERPL-SCNC: 99 MMOL/L (ref 94–109)
CO2 SERPL-SCNC: 31 MMOL/L (ref 20–32)
CREAT SERPL-MCNC: 1.58 MG/DL (ref 0.66–1.25)
GFR SERPL CREATININE-BSD FRML MDRD: 45 ML/MIN/1.7M2
GLUCOSE SERPL-MCNC: 129 MG/DL (ref 70–99)
INR PPP: 1.91 (ref 0.86–1.14)
IRON SATN MFR SERPL: 13 % (ref 15–46)
IRON SERPL-MCNC: 43 UG/DL (ref 35–180)
LDH SERPL L TO P-CCNC: 322 U/L (ref 85–227)
POTASSIUM SERPL-SCNC: 4.3 MMOL/L (ref 3.4–5.3)
SODIUM SERPL-SCNC: 134 MMOL/L (ref 133–144)
TIBC SERPL-MCNC: 341 UG/DL (ref 240–430)

## 2018-10-19 PROCEDURE — 83540 ASSAY OF IRON: CPT | Performed by: INTERNAL MEDICINE

## 2018-10-19 PROCEDURE — 85610 PROTHROMBIN TIME: CPT | Performed by: INTERNAL MEDICINE

## 2018-10-19 PROCEDURE — 80048 BASIC METABOLIC PNL TOTAL CA: CPT | Performed by: INTERNAL MEDICINE

## 2018-10-19 PROCEDURE — 83615 LACTATE (LD) (LDH) ENZYME: CPT | Performed by: INTERNAL MEDICINE

## 2018-10-19 PROCEDURE — 36415 COLL VENOUS BLD VENIPUNCTURE: CPT | Performed by: INTERNAL MEDICINE

## 2018-10-19 PROCEDURE — 83550 IRON BINDING TEST: CPT | Performed by: INTERNAL MEDICINE

## 2018-10-19 NOTE — MR AVS SNAPSHOT
Jim Willingham   10/19/2018   Anticoagulation Therapy Visit    Description:  63 year old male   Provider:  Brendan Montoya, RN   Department:  [facility]           INR as of 10/19/2018     Today's INR 1.91!      Anticoagulation Summary as of 10/19/2018     INR goal 2.0-3.0   Today's INR 1.91!   Full warfarin instructions 10/19: 7.5 mg; Otherwise 5 mg on Fri; 7.5 mg all other days   Next INR check 10/24/2018    Indications   LVAD (left ventricular assist device) present (H) [Z95.811]  Long-term (current) use of anticoagulants [Z79.01] [Z79.01]         October 2018 Details    Sun Mon Tue Wed Thu Fri Sat      1               2               3               4               5               6                 7               8               9               10               11               12               13                 14               15               16               17               18               19      7.5 mg   See details      20      7.5 mg           21      7.5 mg         22      7.5 mg         23      7.5 mg         24            25               26               27                 28               29               30               31                   Date Details   10/19 This INR check       Date of next INR:  10/24/2018         How to take your warfarin dose     To take:  7.5 mg Take 1.5 of the 5 mg tablets.

## 2018-10-19 NOTE — PROGRESS NOTES
ANTICOAGULATION FOLLOW-UP CLINIC VISIT    Patient Name:  Jim Willingham  Date:  10/19/2018  Contact Type:  Telephone    SUBJECTIVE:     Patient Findings     Positives No Problem Findings    Comments No missed doses.  Recommended a one time increase to 7.5mg of Coumadin today (Friday). Adjusted Aspirin to 325mg until patient INR is therapeutic per protocol.           OBJECTIVE    INR   Date Value Ref Range Status   10/19/2018 1.91 (H) 0.86 - 1.14 Final       ASSESSMENT / PLAN  INR assessment SUB    Recheck INR In: 5 DAYS    INR Location Clinic      Anticoagulation Summary as of 10/19/2018     INR goal 2.0-3.0   Today's INR 1.91!   Warfarin maintenance plan 5 mg (5 mg x 1) on Fri; 7.5 mg (5 mg x 1.5) all other days   Full warfarin instructions 10/19: 7.5 mg; Otherwise 5 mg on Fri; 7.5 mg all other days   Weekly warfarin total 50 mg   Plan last modified Maru Alonso RN (6/4/2018)   Next INR check 10/24/2018   Priority INR   Target end date     Indications   LVAD (left ventricular assist device) present (H) [Z95.811]  Long-term (current) use of anticoagulants [Z79.01] [Z79.01]         Anticoagulation Episode Summary     INR check location     Preferred lab     Send INR reminders to [facility]    Comments HIPPA Forms mailed 6/26/17  Labs drawn either at [facility] or [facility]  LVAD placed 6/19/17   II  ASA 81 mg daily      Anticoagulation Care Providers     Provider Role Specialty Phone number    Delisa Montgomery MD [facility] Cardiology [phone]            See the Encounter Report to view Anticoagulation Flowsheet and Dosing Calendar (Go to Encounters tab in chart review, and find the Anticoagulation Therapy Visit)    Patient had LVAD placed on:   6/19/17  Patient's current Aspirin dose: 325mg until INR is therapeutic per protocol  LVAD Protocol followed:  Yes.   If Not Followed Explanation:  NA    Spoke with patient. Gave them their lab results and new warfarin recommendation.   No changes in health, medication, or diet. No missed doses, no falls. No signs or symptoms of bleed or clotting.    Brendan Montoya, RN

## 2018-10-22 DIAGNOSIS — Z95.811 LVAD (LEFT VENTRICULAR ASSIST DEVICE) PRESENT (H): Primary | ICD-10-CM

## 2018-10-22 DIAGNOSIS — Z95.811 LVAD (LEFT VENTRICULAR ASSIST DEVICE) PRESENT (H): ICD-10-CM

## 2018-10-22 DIAGNOSIS — I50.22 CHRONIC SYSTOLIC CONGESTIVE HEART FAILURE (H): ICD-10-CM

## 2018-10-23 RX ORDER — WARFARIN SODIUM 5 MG/1
TABLET ORAL
Qty: 90 TABLET | Refills: 5 | Status: ON HOLD | OUTPATIENT
Start: 2018-10-23 | End: 2020-08-07

## 2018-10-24 ENCOUNTER — CARE COORDINATION (OUTPATIENT)
Dept: CARDIOLOGY | Facility: CLINIC | Age: 63
End: 2018-10-24

## 2018-10-24 NOTE — PROGRESS NOTES
Pt had an appointment scheduled in clinic today but called this am to cancel. Pt states he is having pain in his feet, arms and shoulders, similar to when he has had gout flares in the past. The pain is so severe he isn't able to leave the house. Advised pt that he should be seen by his primary care or go to an urgent care for this pain. Pt wants to try resting and taking tylenol today, and if pain is not improved tomorrow will be seen.   Will contact the VAD clinic scheduler to reschedule pt in cardiology clinic.

## 2018-10-24 NOTE — PROGRESS NOTES
Addendum 10/24/18    I spoke with Jim today. He is having a gout flair. He will stay on his Asa 325mgs and warfarin 7.5mgs daily and will get an INR by Friday 10/26/18.    Jae Rowland [facility]

## 2018-10-26 ENCOUNTER — ANTICOAGULATION THERAPY VISIT (OUTPATIENT)
Dept: ANTICOAGULATION | Facility: CLINIC | Age: 63
End: 2018-10-26

## 2018-10-26 DIAGNOSIS — Z95.811 LVAD (LEFT VENTRICULAR ASSIST DEVICE) PRESENT (H): ICD-10-CM

## 2018-10-26 DIAGNOSIS — I50.22 CHRONIC SYSTOLIC CONGESTIVE HEART FAILURE (H): ICD-10-CM

## 2018-10-26 LAB
ALBUMIN SERPL-MCNC: 3.9 G/DL (ref 3.4–5)
ALP SERPL-CCNC: 87 U/L (ref 40–150)
ALT SERPL W P-5'-P-CCNC: 32 U/L (ref 0–70)
ANION GAP SERPL CALCULATED.3IONS-SCNC: 10 MMOL/L (ref 3–14)
AST SERPL W P-5'-P-CCNC: 25 U/L (ref 0–45)
BILIRUB SERPL-MCNC: 0.9 MG/DL (ref 0.2–1.3)
BUN SERPL-MCNC: 36 MG/DL (ref 7–30)
CALCIUM SERPL-MCNC: 8.8 MG/DL (ref 8.5–10.1)
CHLORIDE SERPL-SCNC: 101 MMOL/L (ref 94–109)
CO2 SERPL-SCNC: 28 MMOL/L (ref 20–32)
CREAT SERPL-MCNC: 1.46 MG/DL (ref 0.66–1.25)
ERYTHROCYTE [DISTWIDTH] IN BLOOD BY AUTOMATED COUNT: 14.4 % (ref 10–15)
GFR SERPL CREATININE-BSD FRML MDRD: 49 ML/MIN/1.7M2
GLUCOSE SERPL-MCNC: 98 MG/DL (ref 70–99)
HCT VFR BLD AUTO: 34.5 % (ref 40–53)
HGB BLD-MCNC: 10.4 G/DL (ref 13.3–17.7)
INR PPP: 2.23 (ref 0.86–1.14)
LDH SERPL L TO P-CCNC: 370 U/L (ref 85–227)
MCH RBC QN AUTO: 26.7 PG (ref 26.5–33)
MCHC RBC AUTO-ENTMCNC: 30.1 G/DL (ref 31.5–36.5)
MCV RBC AUTO: 89 FL (ref 78–100)
PLATELET # BLD AUTO: 184 10E9/L (ref 150–450)
POTASSIUM SERPL-SCNC: 4 MMOL/L (ref 3.4–5.3)
PROT SERPL-MCNC: 7.2 G/DL (ref 6.8–8.8)
RBC # BLD AUTO: 3.89 10E12/L (ref 4.4–5.9)
SODIUM SERPL-SCNC: 139 MMOL/L (ref 133–144)
WBC # BLD AUTO: 7.5 10E9/L (ref 4–11)

## 2018-10-26 PROCEDURE — 85027 COMPLETE CBC AUTOMATED: CPT | Performed by: NURSE PRACTITIONER

## 2018-10-26 PROCEDURE — 85610 PROTHROMBIN TIME: CPT | Performed by: NURSE PRACTITIONER

## 2018-10-26 PROCEDURE — 80053 COMPREHEN METABOLIC PANEL: CPT | Performed by: NURSE PRACTITIONER

## 2018-10-26 PROCEDURE — 36415 COLL VENOUS BLD VENIPUNCTURE: CPT | Performed by: NURSE PRACTITIONER

## 2018-10-26 PROCEDURE — 83615 LACTATE (LD) (LDH) ENZYME: CPT | Performed by: NURSE PRACTITIONER

## 2018-10-26 NOTE — PROGRESS NOTES
ANTICOAGULATION FOLLOW-UP CLINIC VISIT    Patient Name:  Jim Willingham  Date:  10/26/2018  Contact Type:  Telephone    SUBJECTIVE:     Patient Findings     Positives No Problem Findings           OBJECTIVE    INR   Date Value Ref Range Status   10/26/2018 2.23 (H) 0.86 - 1.14 Final       ASSESSMENT / PLAN  INR assessment THER    Recheck INR In: 1 WEEK    INR Location Clinic      Anticoagulation Summary as of 10/26/2018     INR goal 2.0-3.0   Today's INR 2.23   Warfarin maintenance plan 7.5 mg (5 mg x 1.5) every day   Full warfarin instructions 7.5 mg every day   Weekly warfarin total 52.5 mg   Plan last modified Maged Bedolla RPH (10/26/2018)   Next INR check 11/2/2018   Priority INR   Target end date     Indications   LVAD (left ventricular assist device) present (H) [Z95.811]  Long-term (current) use of anticoagulants [Z79.01] [Z79.01]         Anticoagulation Episode Summary     INR check location     Preferred lab     Send INR reminders to [facility]    Comments HIPPA Forms mailed 6/26/17  Labs drawn either at [facility] or [facility]  LVAD placed 6/19/17   II  ASA 81 mg daily      Anticoagulation Care Providers     Provider Role Specialty Phone number    DickinsonDelisa travis MD Responsible Cardiology [phone]            See the Encounter Report to view Anticoagulation Flowsheet and Dosing Calendar (Go to Encounters tab in chart review, and find the Anticoagulation Therapy Visit)    Spoke with patient. Gave them their lab results and new warfarin recommendation.  No changes in health, medication, or diet. No missed doses, no falls. No signs or symptoms of bleed or clotting.  Patient had LVAD placed on:   Heart Mate 2  6/19/17  Patient's current Aspirin dose: Decrease to 81mgs daily  LVAD Protocol followed: Yes.       Maged Bedolla RPH

## 2018-10-26 NOTE — MR AVS SNAPSHOT
Jim Willingham   10/26/2018   Anticoagulation Therapy Visit    Description:  63 year old male   Provider:  Maged Bedolla, [facility]   [facility]           INR as of 10/26/2018     Today's INR 2.23      Anticoagulation Summary as of 10/26/2018     INR goal 2.0-3.0   Today's INR 2.23   Full warfarin instructions 7.5 mg every day   Next INR check 11/2/2018    Indications   LVAD (left ventricular assist device) present (H) [Z95.811]  Long-term (current) use of anticoagulants [Z79.01] [Z79.01]         October 2018 Details    Sun Mon Tue Wed Thu Fri Sat      1               2               3               4               5               6                 7               8               9               10               11               12               13                 14               15               16               17               18               19               20                 21               22               23               24               25               26      7.5 mg   See details      27      7.5 mg           28      7.5 mg         29      7.5 mg         30      7.5 mg         31      7.5 mg             Date Details   10/26 This INR check               How to take your warfarin dose     To take:  7.5 mg Take 1.5 of the 5 mg tablets.           November 2018 Details    Sun Mon Tue Wed Thu Fri Sat         1      7.5 mg         2            3                 4               5               6               7               8               9               10                 11               12               13               14               15               16               17                 18               19               20               21               22               23               24                 25               26               27               28               29               30                 Date Details   No additional details    Date of next INR:  11/2/2018          How to take your warfarin dose     To take:  7.5 mg Take 1.5 of the 5 mg tablets.

## 2018-10-29 ENCOUNTER — CARE COORDINATION (OUTPATIENT)
Dept: CARDIOLOGY | Facility: CLINIC | Age: 63
End: 2018-10-29

## 2018-10-29 NOTE — PROGRESS NOTES
Called pt to check in after he cancelled his appt last week. Pt stated the pain is completely resolved and he is back to baseline. He is still feeling tired and would like to reschedule his appointment. He will call the  to make an appointment. Pt had labs drawn on 10/26 - reviewed with pt. All are stable. Informed pt we can review his iron studies and need for further iron infusions when he is seen in clinic. Pt is agreeable.

## 2018-11-02 ENCOUNTER — ANTICOAGULATION THERAPY VISIT (OUTPATIENT)
Dept: ANTICOAGULATION | Facility: CLINIC | Age: 63
End: 2018-11-02

## 2018-11-02 DIAGNOSIS — Z95.811 LVAD (LEFT VENTRICULAR ASSIST DEVICE) PRESENT (H): ICD-10-CM

## 2018-11-02 DIAGNOSIS — Z79.01 LONG TERM CURRENT USE OF ANTICOAGULANT THERAPY: ICD-10-CM

## 2018-11-02 LAB — INR PPP: 1.91 (ref 0.86–1.14)

## 2018-11-02 PROCEDURE — 85610 PROTHROMBIN TIME: CPT | Performed by: INTERNAL MEDICINE

## 2018-11-02 PROCEDURE — 36415 COLL VENOUS BLD VENIPUNCTURE: CPT | Performed by: INTERNAL MEDICINE

## 2018-11-02 NOTE — PROGRESS NOTES
ANTICOAGULATION FOLLOW-UP CLINIC VISIT    Patient Name:  Jim Willingham  Date:  11/2/2018  Contact Type:  Telephone    SUBJECTIVE:     Patient Findings     Positives No Problem Findings    Comments Spoke to Jim.  He reviewed his recent diet with writer, he feels he may have eaten an extra serving of Vit. K this week.  Increased patient's Aspirin dose per protocol to 325mg daily until therapeutic.  Will check in one week.           OBJECTIVE    INR   Date Value Ref Range Status   11/02/2018 1.91 (H) 0.86 - 1.14 Final       ASSESSMENT / PLAN  INR assessment SUB    Recheck INR In: 1 WEEK    INR Location Clinic      Anticoagulation Summary as of 11/2/2018     INR goal 2.0-3.0   Today's INR 1.91!   Warfarin maintenance plan 7.5 mg (5 mg x 1.5) every day   Full warfarin instructions 11/2: 10 mg; Otherwise 7.5 mg every day   Weekly warfarin total 52.5 mg   Plan last modified Maged Bedolla, [facility] (10/26/2018)   Next INR check 11/9/2018   Priority INR   Target end date     Indications   LVAD (left ventricular assist device) present (H) [Z95.811]  Long-term (current) use of anticoagulants [Z79.01] [Z79.01]         Anticoagulation Episode Summary     INR check location     Preferred lab     Send INR reminders to [facility]    Comments HIPPA Forms mailed 6/26/17  Labs drawn either at [facility] or [facility]  LVAD placed 6/19/17   II  ASA 81 mg daily      Anticoagulation Care Providers     Provider Role Specialty Phone number    Delisa Montgomery MD Responsible Cardiology [phone]            See the Encounter Report to view Anticoagulation Flowsheet and Dosing Calendar (Go to Encounters tab in chart review, and find the Anticoagulation Therapy Visit)    Spoke with patient. Gave them their lab results and new warfarin recommendation.  No changes in health, medication, or diet. No missed doses, no falls. No signs or symptoms of bleed or clotting.      Patient had LVAD placed on:   6/9/17  Heartmate 2  Patient's current Aspirin dose: 325mg daily until INR is therapeutic  LVAD Protocol followed:  No.   If Not Followed Explanation:  Patient declined coming back to the lab in the 48-72 hour timeframe in the subtherapeutic INR LVAD protocol.     Brendan Montoya RN

## 2018-11-02 NOTE — MR AVS SNAPSHOT
Jim Willingham   11/2/2018   Anticoagulation Therapy Visit    Description:  63 year old male   Provider:  Brendan Montoya, RN   Department:  [facility]           INR as of 11/2/2018     Today's INR 1.91!      Anticoagulation Summary as of 11/2/2018     INR goal 2.0-3.0   Today's INR 1.91!   Full warfarin instructions 11/2: 10 mg; Otherwise 7.5 mg every day   Next INR check 11/9/2018    Indications   LVAD (left ventricular assist device) present (H) [Z95.811]  Long-term (current) use of anticoagulants [Z79.01] [Z79.01]         November 2018 Details    Sun Mon Tue Wed Thu Fri Sat         1               2      10 mg   See details      3      7.5 mg           4      7.5 mg         5      7.5 mg         6      7.5 mg         7      7.5 mg         8      7.5 mg         9            10                 11               12               13               14               15               16               17                 18               19               20               21               22               23               24                 25               26               27               28               29               30                 Date Details   11/02 This INR check       Date of next INR:  11/9/2018         How to take your warfarin dose     To take:  7.5 mg Take 1.5 of the 5 mg tablets.    To take:  10 mg Take 2 of the 5 mg tablets.

## 2018-11-09 ENCOUNTER — ANTICOAGULATION THERAPY VISIT (OUTPATIENT)
Dept: ANTICOAGULATION | Facility: CLINIC | Age: 63
End: 2018-11-09

## 2018-11-09 DIAGNOSIS — Z95.811 LVAD (LEFT VENTRICULAR ASSIST DEVICE) PRESENT (H): ICD-10-CM

## 2018-11-09 DIAGNOSIS — Z79.01 LONG TERM CURRENT USE OF ANTICOAGULANT THERAPY: ICD-10-CM

## 2018-11-09 LAB — INR PPP: 2.17 (ref 0.86–1.14)

## 2018-11-09 PROCEDURE — 85610 PROTHROMBIN TIME: CPT | Performed by: INTERNAL MEDICINE

## 2018-11-09 PROCEDURE — 36415 COLL VENOUS BLD VENIPUNCTURE: CPT | Performed by: INTERNAL MEDICINE

## 2018-11-12 NOTE — PLAN OF CARE
Problem: Goal Outcome Summary  Goal: Goal Outcome Summary  PT 6C: Pt completes sit<>stand transfers with Matthew of 1-2 (depending on surface height). Initiated ambulation today. Ambulates ~12 ft x2 with fww and CGA. Limited d/t fatigue, SOB, and incisional discomfort. HR 90 bpm and SpO2 91% on 2L of O2 via NC. Maintains sternal precautions with transfers.      Recommend discharge to TCU once medically appropriate.        DISPLAY PLAN FREE TEXT

## 2018-11-19 DIAGNOSIS — Z95.811 LVAD (LEFT VENTRICULAR ASSIST DEVICE) PRESENT (H): Primary | ICD-10-CM

## 2018-11-20 ENCOUNTER — ALLIED HEALTH/NURSE VISIT (OUTPATIENT)
Dept: CARDIOLOGY | Facility: CLINIC | Age: 63
End: 2018-11-20
Payer: COMMERCIAL

## 2018-11-20 ENCOUNTER — OFFICE VISIT (OUTPATIENT)
Dept: CARDIOLOGY | Facility: CLINIC | Age: 63
End: 2018-11-20
Attending: INTERNAL MEDICINE
Payer: COMMERCIAL

## 2018-11-20 ENCOUNTER — CARE COORDINATION (OUTPATIENT)
Dept: CARDIOLOGY | Facility: CLINIC | Age: 63
End: 2018-11-20

## 2018-11-20 ENCOUNTER — ANTICOAGULATION THERAPY VISIT (OUTPATIENT)
Dept: ANTICOAGULATION | Facility: CLINIC | Age: 63
End: 2018-11-20

## 2018-11-20 VITALS
HEIGHT: 68 IN | HEART RATE: 61 BPM | SYSTOLIC BLOOD PRESSURE: 88 MMHG | WEIGHT: 265 LBS | BODY MASS INDEX: 40.16 KG/M2 | RESPIRATION RATE: 18 BRPM | OXYGEN SATURATION: 95 %

## 2018-11-20 DIAGNOSIS — I50.22 CHRONIC SYSTOLIC CONGESTIVE HEART FAILURE (H): ICD-10-CM

## 2018-11-20 DIAGNOSIS — Z95.811 LVAD (LEFT VENTRICULAR ASSIST DEVICE) PRESENT (H): ICD-10-CM

## 2018-11-20 DIAGNOSIS — I42.8 NICM (NONISCHEMIC CARDIOMYOPATHY) (H): Primary | ICD-10-CM

## 2018-11-20 DIAGNOSIS — E08.22 DIABETES MELLITUS DUE TO UNDERLYING CONDITION WITH CHRONIC KIDNEY DISEASE, WITHOUT LONG-TERM CURRENT USE OF INSULIN, UNSPECIFIED CKD STAGE (H): ICD-10-CM

## 2018-11-20 DIAGNOSIS — I42.8 NICM (NONISCHEMIC CARDIOMYOPATHY) (H): ICD-10-CM

## 2018-11-20 DIAGNOSIS — Z95.811 LVAD (LEFT VENTRICULAR ASSIST DEVICE) PRESENT (H): Primary | ICD-10-CM

## 2018-11-20 LAB
ALBUMIN SERPL-MCNC: 4 G/DL (ref 3.4–5)
ALP SERPL-CCNC: 88 U/L (ref 40–150)
ALT SERPL W P-5'-P-CCNC: 25 U/L (ref 0–70)
ANION GAP SERPL CALCULATED.3IONS-SCNC: 9 MMOL/L (ref 3–14)
AST SERPL W P-5'-P-CCNC: 22 U/L (ref 0–45)
BILIRUB SERPL-MCNC: 0.6 MG/DL (ref 0.2–1.3)
BUN SERPL-MCNC: 50 MG/DL (ref 7–30)
CALCIUM SERPL-MCNC: 8.8 MG/DL (ref 8.5–10.1)
CHLORIDE SERPL-SCNC: 97 MMOL/L (ref 94–109)
CO2 SERPL-SCNC: 28 MMOL/L (ref 20–32)
CREAT SERPL-MCNC: 1.51 MG/DL (ref 0.66–1.25)
ERYTHROCYTE [DISTWIDTH] IN BLOOD BY AUTOMATED COUNT: 14.6 % (ref 10–15)
FERRITIN SERPL-MCNC: 71 NG/ML (ref 26–388)
GFR SERPL CREATININE-BSD FRML MDRD: 47 ML/MIN/1.7M2
GLUCOSE SERPL-MCNC: 139 MG/DL (ref 70–99)
HCT VFR BLD AUTO: 39.2 % (ref 40–53)
HGB BLD-MCNC: 12.1 G/DL (ref 13.3–17.7)
INR PPP: 1.93 (ref 0.86–1.14)
IRON SATN MFR SERPL: 14 % (ref 15–46)
IRON SERPL-MCNC: 48 UG/DL (ref 35–180)
LDH SERPL L TO P-CCNC: 362 U/L (ref 85–227)
MCH RBC QN AUTO: 27.2 PG (ref 26.5–33)
MCHC RBC AUTO-ENTMCNC: 30.9 G/DL (ref 31.5–36.5)
MCV RBC AUTO: 88 FL (ref 78–100)
PLATELET # BLD AUTO: 209 10E9/L (ref 150–450)
POTASSIUM SERPL-SCNC: 4 MMOL/L (ref 3.4–5.3)
PROT SERPL-MCNC: 7.5 G/DL (ref 6.8–8.8)
RBC # BLD AUTO: 4.45 10E12/L (ref 4.4–5.9)
SODIUM SERPL-SCNC: 134 MMOL/L (ref 133–144)
TIBC SERPL-MCNC: 353 UG/DL (ref 240–430)
WBC # BLD AUTO: 11.2 10E9/L (ref 4–11)

## 2018-11-20 PROCEDURE — 84238 ASSAY NONENDOCRINE RECEPTOR: CPT | Performed by: NURSE PRACTITIONER

## 2018-11-20 PROCEDURE — 93750 INTERROGATION VAD IN PERSON: CPT | Mod: ZF

## 2018-11-20 PROCEDURE — 83540 ASSAY OF IRON: CPT | Performed by: NURSE PRACTITIONER

## 2018-11-20 PROCEDURE — 93284 PRGRMG EVAL IMPLANTABLE DFB: CPT | Mod: 26 | Performed by: INTERNAL MEDICINE

## 2018-11-20 PROCEDURE — 93750 INTERROGATION VAD IN PERSON: CPT | Performed by: NURSE PRACTITIONER

## 2018-11-20 PROCEDURE — 85027 COMPLETE CBC AUTOMATED: CPT | Performed by: NURSE PRACTITIONER

## 2018-11-20 PROCEDURE — 85610 PROTHROMBIN TIME: CPT | Performed by: NURSE PRACTITIONER

## 2018-11-20 PROCEDURE — 82728 ASSAY OF FERRITIN: CPT | Performed by: NURSE PRACTITIONER

## 2018-11-20 PROCEDURE — G0463 HOSPITAL OUTPT CLINIC VISIT: HCPCS | Mod: 25

## 2018-11-20 PROCEDURE — 36415 COLL VENOUS BLD VENIPUNCTURE: CPT | Performed by: NURSE PRACTITIONER

## 2018-11-20 PROCEDURE — 83615 LACTATE (LD) (LDH) ENZYME: CPT | Performed by: NURSE PRACTITIONER

## 2018-11-20 PROCEDURE — 80053 COMPREHEN METABOLIC PANEL: CPT | Performed by: NURSE PRACTITIONER

## 2018-11-20 PROCEDURE — 93296 REM INTERROG EVL PM/IDS: CPT | Mod: ZF

## 2018-11-20 PROCEDURE — 99214 OFFICE O/P EST MOD 30 MIN: CPT | Mod: 25 | Performed by: NURSE PRACTITIONER

## 2018-11-20 PROCEDURE — 83550 IRON BINDING TEST: CPT | Performed by: NURSE PRACTITIONER

## 2018-11-20 RX ORDER — METOPROLOL SUCCINATE 25 MG/1
TABLET, EXTENDED RELEASE ORAL
Qty: 180 TABLET | Refills: 5 | Status: SHIPPED | OUTPATIENT
Start: 2018-11-20 | End: 2019-12-12

## 2018-11-20 RX ORDER — LOSARTAN POTASSIUM 25 MG/1
37.5 TABLET ORAL DAILY
Qty: 90 TABLET | Refills: 1 | Status: SHIPPED | OUTPATIENT
Start: 2018-11-20 | End: 2019-03-28

## 2018-11-20 RX ORDER — LOSARTAN POTASSIUM 50 MG/1
50 TABLET ORAL DAILY
Qty: 90 TABLET | Refills: 1 | Status: SHIPPED | OUTPATIENT
Start: 2018-11-20 | End: 2018-11-20

## 2018-11-20 ASSESSMENT — PAIN SCALES - GENERAL: PAINLEVEL: NO PAIN (0)

## 2018-11-20 NOTE — PROGRESS NOTES
Preliminary Device Interrogation Results.  Final physician signed paceart report to be scanned and attached.    Patient seen in [facility] for evaluation and iterative programming of his Medtronic multi lead ICD per MD orders.  Patient has an appointment to see Shanon Means NP,  today.  Normal ICD function.  Since last remote on 10/9/18, there have been 3 episodes in the VF zone recorded recorded, all occurring on 10/24/18 between 7:00-7:23 AM with rates between 207-214 bpm.  The first episode (#223) required two sequences of ATP which briefly converted rhythm.  The two subsequent episodes each required ATP x 1 with episode 224 having a diverted shock.  3 NSVT detections also recorded since10/9/18, 1-3 second durations, with rates between 201-207 bpm.    Intrinsic rhythm = SR @ 85  bpm.  AP = 1.0%.   BVP = 91%, VSR = 5.1%.   OptiVol fluid index shows elevation near threshold in October, now currently at baseline.  Estimated battery longevity to MARVA = 3.6 years.  No short v-v intervals recorded.  Lead trends appear stable.  Patient reports that he is feeling well and denies any recall of syncope, presyncope on dates of stored episodes.  Plan for patient to return to clinic in 3 months.      Muli lead ICD

## 2018-11-20 NOTE — MR AVS SNAPSHOT
After Visit Summary   11/20/2018    Jim Willingham    MRN: [id number]           Patient Information     Date Of Birth          1955        Visit Information        Provider Department      11/20/2018 6:00 AM UC ICD REMOTE [facility]        Today's Diagnoses     NICM (nonischemic cardiomyopathy) (H)/ EF 20%    -  1       Follow-ups after your visit        Your next 10 appointments already scheduled     Dec 07, 2018  9:30 AM CST   Lab with UC LAB   [facility] ([facility])    [address]  1st Floor  [facility] [id number] [phone]            Dec 07, 2018 10:00 AM CST   (Arrive by 9:45 AM)   Implanted Defibulator with Uc Cv Device 1   [facility] ([facility])    [address] [place] [address]  3rd Floor  [id number]               Dec 07, 2018 10:30 AM CST   (Arrive by 10:15 AM)   Ventricular Assist Device with Delisa Montgomery MD   [facility] ([facility])    [address] [place] [address]  [address] [place] [id number] [phone]              Future tests that were ordered for you today     Open Future Orders        Priority Expected Expires Ordered    Comprehensive metabolic panel Routine 12/7/2018 12/31/2018 11/20/2018    CBC with platelets Routine 12/7/2018 12/31/2018 11/20/2018    INR Routine 12/7/2018 12/31/2018 11/20/2018    Lactate Dehydrogenase Routine 12/7/2018 12/31/2018 11/20/2018    Follow-Up with Device Clinic - 3 months Routine 2/18/2019 5/19/2019 11/20/2018            Who to contact     If you have questions or need follow up information about today's clinic visit or your schedule please contact [facility] directly at [phone].  Normal or non-critical lab and imaging results will be communicated to you by MyChart, letter or phone within 4 business days after the clinic has received the results. If you do not hear from us within 7 days, please contact the  clinic through [employer] or phone. If you have a critical or abnormal lab result, we will notify you by phone as soon as possible.  Submit refill requests through [employer] or call your pharmacy and they will forward the refill request to us. Please allow 3 business days for your refill to be completed.          Additional Information About Your Visit        Care EveryWhere ID     This is your Care EveryWhere ID. This could be used by other organizations to access your [facility] medical records  [id number]         Blood Pressure from Last 3 Encounters:   11/20/18 (!) 88/0   08/16/18 125/87   08/06/18 (!) 88/0    Weight from Last 3 Encounters:   11/20/18 120.2 kg (265 lb)   08/28/18 126.9 kg (279 lb 11.2 oz)   08/16/18 126.6 kg (279 lb 3.2 oz)              We Performed the Following     INTERROGATION DEVICE EVAL REMOTE, PACER/ICD          Today's Medication Changes          These changes are accurate as of 11/20/18  4:36 PM.  If you have any questions, ask your nurse or doctor.               Start taking these medicines.        Dose/Directions    losartan 25 MG tablet   Commonly known as:  COZAAR   Used for:  LVAD (left ventricular assist device) present (H)   Started by:  Shelia Means NP        Dose:  37.5 mg   Take 1.5 tablets (37.5 mg) by mouth daily   Quantity:  90 tablet   Refills:  1         These medicines have changed or have updated prescriptions.        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, without long-term current use of insulin, unspecified CKD stage (H)   Changed by:  Shelia Means NP        Dose:  500 mg   Take 1 tablet (500 mg) by mouth 2 times daily (with meals)   Quantity:  60 tablet   Refills:  0       metoprolol succinate 25 MG 24 hr tablet   Commonly known as:  TOPROL-XL   This may have changed:    - how much to take  - how  to take this  - when to take this  - additional instructions   Used for:  Chronic systolic congestive heart failure (H), LVAD (left ventricular assist device) present (H)   Changed by:  Shelia Means NP        Take 50mg in the morning and 25mg in the evening   Quantity:  180 tablet   Refills:  5       potassium chloride 10 MEQ tablet   Commonly known as:  K-TAB,KLOR-CON   This may have changed:  Another medication with the same name was removed. Continue taking this medication, and follow the directions you see here.   Used for:  LVAD (left ventricular assist device) present (H), Chronic systolic congestive heart failure (H)   Changed by:  Shelia Means NP        Dose:  20 mEq   Take 2 tablets (20 mEq) by mouth daily   Quantity:  120 tablet   Refills:  5         Stop taking these medicines if you haven't already. Please contact your care team if you have questions.     polyethylene glycol Packet   Commonly known as:  MIRALAX/GLYCOLAX   Stopped by:  Shelia Means NP           senna-docusate 8.6-50 MG per tablet   Commonly known as:  SENOKOT-S;PERICOLACE   Stopped by:  Shelia Means NP                Where to get your medicines      These medications were sent to [facility] [address] [place] [address] [address] AT [facility] [address] [place] [address]     Phone:  [phone]     losartan 25 MG tablet    metoprolol succinate 25 MG 24 hr tablet                Primary Care Provider Office Phone # Fax #    Jovany Salas [phone]       [address] [address]        Equal Access to Services     [facility]: Hadii aad ku hadasho Soomaali, waaxda luqadaha, qaybta kaalmada adeegyada, kendra salmon hayharesh marquez . So [facility] [phone].    ATENCIÓN: Si habla español, tiene a roger disposición servicios gratuitos de asistencia lingüística. Llame al [phone].    We comply with applicable  federal civil rights laws and [place] laws. We do not discriminate on the basis of race, color, national origin, age, disability, sex, sexual orientation, or gender identity.            Thank you!     Thank you for choosing [facility]  for your care. Our goal is always to provide you with excellent care. Hearing back from our patients is one way we can continue to improve our services. Please take a few minutes to complete the written survey that you may receive in the mail after your visit with us. Thank you!             Your Updated Medication List - Protect others around you: Learn how to safely use, store and throw away your medicines at www.disposemymeds.org.          This list is accurate as of 11/20/18  4:36 PM.  Always use your most recent med list.                   Brand Name Dispense Instructions for use Diagnosis    acetaminophen 325 MG tablet    TYLENOL    100 tablet    Take 2 tablets (650 mg) by mouth every 6 hours    Acute post-operative pain       ALDACTONE 25 MG tablet   Generic drug:  spironolactone     30 tablet    Take 1 tablet (25 mg) by mouth daily    LVAD (left ventricular assist device) present (H), Chronic systolic congestive heart failure (H), Nerve pain, Acute idiopathic gout, unspecified site       allopurinol 100 MG tablet    ZYLOPRIM    60 tablet    Take 1 tablet (100 mg) by mouth daily    Acute idiopathic gout, unspecified site       aspirin 81 MG chewable tablet     36 tablet    1 tablet (81 mg) by Oral or Feeding Tube route daily    LVAD (left ventricular assist device) present (H)       BREO ELLIPTA 200-25 MCG/INH inhaler   Generic drug:  fluticasone-vilanterol      Inhale 1 puff into the lungs daily        bumetanide 1 MG tablet    BUMEX    120 tablet    Take 2 tablets (2 mg) by mouth 2 times daily    LVAD (left ventricular assist device) present (H)       celeXA 20 MG tablet   Generic drug:  citalopram      Take 20 mg by mouth daily        cyanocobalamin 1000 MCG/ML  injection    VITAMIN B12     Inject 1,000 mcg into the muscle every 30 days        gabapentin 300 MG capsule    NEURONTIN    90 capsule    Take 1 capsule (300 mg) by mouth 3 times daily    Nerve pain, LVAD (left ventricular assist device) present (H), Acute idiopathic gout, unspecified site, Chronic systolic congestive heart failure (H)       INCRUSE ELLIPTA 62.5 MCG/INH inhaler   Generic drug:  umeclidinium      Inhale 1 puff into the lungs daily        ipratropium - albuterol 0.5 mg/2.5 mg/3 mL 0.5-2.5 (3) MG/3ML neb solution    DUONEB     Take 3 mLs by nebulization every 4 hours as needed for shortness of breath / dyspnea or wheezing        losartan 25 MG tablet    COZAAR    90 tablet    Take 1.5 tablets (37.5 mg) by mouth daily    LVAD (left ventricular assist device) present (H)       metFORMIN 500 MG tablet    GLUCOPHAGE    60 tablet    Take 1 tablet (500 mg) by mouth 2 times daily (with meals)    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, without long-term current use of insulin, unspecified CKD stage (H)       metolazone 2.5 MG tablet    ZAROXOLYN    1 tablet    Take 1 tablet (2.5 mg) by mouth once for 1 dose    Chronic systolic congestive heart failure (H), LVAD (left ventricular assist device) present (H)       metoprolol succinate 25 MG 24 hr tablet    TOPROL-XL    180 tablet    Take 50mg in the morning and 25mg in the evening    Chronic systolic congestive heart failure (H), LVAD (left ventricular assist device) present (H)       pantoprazole 40 MG EC tablet    PROTONIX    60 tablet    Take 1 tablet (40 mg) by mouth every morning    Gastroesophageal reflux disease without esophagitis       potassium chloride 10 MEQ tablet    K-TAB,KLOR-CON    120 tablet    Take 2 tablets (20 mEq) by mouth daily    LVAD (left ventricular assist device) present (H), Chronic systolic congestive heart failure (H)       predniSONE 20 MG tablet    DELTASONE    20 tablet    Take 0.5 tablets (10  mg) by mouth daily    Acute idiopathic gout, unspecified site, LVAD (left ventricular assist device) present (H), Nerve pain, Chronic systolic congestive heart failure (H)       PROAIR  (90 Base) MCG/ACT inhaler   Generic drug:  albuterol      Inhale 2 puffs into the lungs every 4 hours as needed for shortness of breath / dyspnea or wheezing        SINGULAIR 10 MG tablet   Generic drug:  montelukast      Take 10 mg by mouth At Bedtime        traMADol 50 MG tablet    ULTRAM    28 tablet    Take 2 tablets (100 mg) by mouth every 6 hours as needed for moderate pain or breakthrough pain    Acute post-operative pain       * warfarin 3 MG tablet    COUMADIN    30 tablet    Use as directed.    LVAD (left ventricular assist device) present (H)       * warfarin 5 MG tablet    COUMADIN    90 tablet    Take 5 - 7.5mg daily or as directed by coumadin clinic.    LVAD (left ventricular assist device) present (H), Chronic systolic congestive heart failure (H)       zinc sulfate 220 (50 Zn) MG capsule    ZINCATE     Take 220 mg by mouth 2 times daily        * Notice:  This list has 2 medication(s) that are the same as other medications prescribed for you. Read the directions carefully, and ask your doctor or other care provider to review them with you.

## 2018-11-20 NOTE — LETTER
11/20/2018      RE: Jim Willingham  [address] [facility] [address]       Dear Colleague,    Thank you for the opportunity to participate in the care of your patient, Jim Willingham, at the [facility]. Please see a copy of my visit note below.    Advanced Heart Failure Clinic -- LVAD Follow Up  November 20, 2018    HPI:   Mr. Jim Willingham is a 63yr old male with a history of NICM (LVEF 10-20%) s/p HMII LVAD (6/19/17, c/b arrhythmias, WALTER, and left pleural effusion), RV dysfunction, CKD, DMII, CECILIA, obesity, COPD, asthma, and HTN who presents to clinic for routine follow up.    Mr. Fernandez states that he has felt well overall.  His device check today showed multiple episodes of VF on 10/24, where he received ATP.  He did not get shocked.  He states that he felt well, and did not have any symptoms during this time and was making breakfast for his grandkids.  He notes that he has been busy with his family, as his son and his family recently moved in with him, and he has adopted his great-grandchild.  He has not been exercising regularly, but notes that his daily activities keep him busy.  He tends to feel worn down by the end of the day, and notes that he is more tired and fatigued than usual.  He is wondering if his iron levels are low again.  He is a little short of breath today, and thinks that he may be developing a URI.  His weight has remained stable at 265-268#, and he notes some fluid retention in his abdomen.  He states that his weight went down to 256# after his last visit, where he was instructed to take one dose of metolazone.  He has been cooking more and trying to watch his diet to lose weight, and has been strictly monitoring his salt intake.  He is not limiting his fluid intake.  He notes ongoing dizziness/lightheadedness, which now comes on when he lies down.  He continues to note tingling in his fingers secondary to carpal tunnel  syndrome, which has improved since starting gabapentin.  He had a mild episode of gout about one month ago, which resolved within one day and has not recurred.  He otherwise denies chest pain, palpitations, PND, orthopnea, new headaches, acute vision changes, weakness, fevers, chills, cough, sore throat, driveline changes, nausea, vomiting, diarrhea, constipation, and signs of bleeding.      PAST MEDICAL HISTORY:  Past Medical History:   Diagnosis Date     Benign essential hypertension 5/11/2017     Cardiomyopathy, unspecified 5/8/2017     CKD (chronic kidney disease) stage 3, GFR 30-59 ml/min 5/11/2017     Depression 5/11/2017     Diabetes mellitus (H) 5/11/2017     H/O gastric bypass 5/11/2017     ICD (implantable cardioverter-defibrillator), biventricular, in situ 5/11/2017     LVAD (left ventricular assist device) present (H)      NICM (nonischemic cardiomyopathy) (H)/ EF 20% 5/11/2017    ECHO: LVEDd. 7.66 cm, Restrictive pattern , Severe mitral valve regurgitation     CECILIA (obstructive sleep apnea) 5/11/2017     Paroxysmal atrial fibrillation (H) 5/11/2017     Paroxysmal VT (H) 5/11/2017     Uncomplicated asthma      Vitamin B12 deficiency (non anemic) 5/11/2017       FAMILY HISTORY:  Family History   Problem Relation Age of Onset     Cerebrovascular Disease Mother      Diabetes Mother      Hypertension Mother      Coronary Artery Disease Father      Type 2 Diabetes Father        SOCIAL HISTORY:  Social History     Social History     Marital status:      Spouse name: N/A     Number of children: N/A     Years of education: N/A     Social History Main Topics     Smoking status: Former Smoker     Quit date: 1995     Smokeless tobacco: Never Used     Alcohol use No     Drug use: No     Sexual activity: Yes     Partners: Female     Other Topics Concern     Parent/Sibling W/ Cabg, Mi Or Angioplasty Before 65f 55m? No     Social History Narrative       CURRENT MEDICATIONS:  Current Outpatient Prescriptions    Medication Sig Dispense Refill     acetaminophen (TYLENOL) 325 MG tablet Take 2 tablets (650 mg) by mouth every 6 hours 100 tablet      albuterol (ALBUTEROL) 108 (90 BASE) MCG/ACT Inhaler Inhale 2 puffs into the lungs every 4 hours as needed for shortness of breath / dyspnea or wheezing       allopurinol (ZYLOPRIM) 100 MG tablet Take 1 tablet (100 mg) by mouth daily 60 tablet 5     aspirin 81 MG chewable tablet 1 tablet (81 mg) by Oral or Feeding Tube route daily 36 tablet      bumetanide (BUMEX) 1 MG tablet Take 2 tablets (2 mg) by mouth 2 times daily 120 tablet 11     citalopram (CELEXA) 20 MG tablet Take 20 mg by mouth daily       cyanocobalamin (VITAMIN B12) 1000 MCG/ML injection Inject 1,000 mcg into the muscle every 30 days       fluticasone-vilanterol (BREO ELLIPTA) 200-25 MCG/INH oral inhaler Inhale 1 puff into the lungs daily       gabapentin (NEURONTIN) 300 MG capsule Take 1 capsule (300 mg) by mouth 3 times daily 90 capsule      ipratropium - albuterol 0.5 mg/2.5 mg/3 mL (DUONEB) 0.5-2.5 (3) MG/3ML neb solution Take 3 mLs by nebulization every 4 hours as needed for shortness of breath / dyspnea or wheezing       losartan (COZAAR) 25 MG tablet Take 1.5 tablets (37.5 mg) by mouth daily 90 tablet 3     metFORMIN (GLUCOPHAGE) 500 MG tablet Take 0.5 tablets (250 mg) by mouth 2 times daily (with meals) 60 tablet 0     metolazone (ZAROXOLYN) 2.5 MG tablet Take 1 tablet (2.5 mg) by mouth once for 1 dose 1 tablet 0     metoprolol succinate (TOPROL-XL) 25 MG 24 hr tablet Take 1 tablet (25 mg) by mouth 2 times daily 180 tablet 5     montelukast (SINGULAIR) 10 MG tablet Take 10 mg by mouth At Bedtime       pantoprazole (PROTONIX) 40 MG EC tablet Take 1 tablet (40 mg) by mouth every morning 60 tablet 5     polyethylene glycol (MIRALAX/GLYCOLAX) Packet Take 17 g by mouth daily as needed for constipation (for no bm in 1 day) 7 packet      potassium chloride (K-TAB,KLOR-CON) 10 MEQ tablet Take 2 tablets (20 mEq) by  "mouth daily 120 tablet 5     potassium chloride (KLOR-CON) 20 MEQ Packet Take 20 mEq by mouth daily 60 packet 5     predniSONE (DELTASONE) 20 MG tablet Take 0.5 tablets (10 mg) by mouth daily 20 tablet 0     senna-docusate (SENOKOT-S;PERICOLACE) 8.6-50 MG per tablet Take 2 tablets by mouth 2 times daily as needed for constipation (Patient not taking: Reported on 7/6/2018) 100 tablet 0     spironolactone (ALDACTONE) 25 MG tablet Take 1 tablet (25 mg) by mouth daily 30 tablet 5     traMADol (ULTRAM) 50 MG tablet Take 2 tablets (100 mg) by mouth every 6 hours as needed for moderate pain or breakthrough pain 28 tablet      umeclidinium (INCRUSE ELLIPTA) 62.5 MCG/INH oral inhaler Inhale 1 puff into the lungs daily       warfarin (COUMADIN) 3 MG tablet Use as directed. 30 tablet 0     warfarin (COUMADIN) 5 MG tablet Take 5 - 7.5mg daily or as directed by coumadin clinic. 90 tablet 5     zinc sulfate (ZINCATE) 220 (50 ZN) MG capsule Take 220 mg by mouth 2 times daily         ROS:   Constitutional: No fever, chills, or sweats. Reports stable weight.   ENT: No visual disturbance, ear ache, epistaxis, sore throat.   Allergies/Immunologic: Negative.   Respiratory: As per HPI.  Cardiovascular: As per HPI.   GI: No nausea, vomiting, hematemesis, melena, or hematochezia.   : No urinary frequency, dysuria, or hematuria.   Integument: Negative.   Psychiatric: as per HPI.   Neuro: As per HPI.  Endocrinology: Negative.   Musculoskeletal: As per HPI.    EXAM:  BP (!) 88/0   Pulse 61   Resp 18   Ht 1.727 m (5' 8\")   Wt 120.2 kg (265 lb)   SpO2 95%   BMI 40.29 kg/m     General: appears comfortable, alert and articulate  Head: normocephalic, atraumatic  Eyes: anicteric sclera, EOMI  Neck: no adenopathy  Orophyarynx: moist mucosa, no lesions, dentition intact  Heart: LVAD hum present, JVD negative when sitting upright  Lungs: clear, no rales or wheezing  Abdomen: soft, non-tender, bowel sounds present, no " hepatosplenomegaly  Extremities: no clubbing, cyanosis, or LE edema  Neurological: normal speech and affect, no gross motor deficits  Integument:  Driveline site covered, with dressing c/d/i.  No open lesions, no jaundice.    LVAD interrogation reviewed with LVAD coordinator, agree with findings.  No LVAD alarms or signs of pump malfunction.      Device interrogation reviewed.  Note 3 episodes in the VF zone with HRs 207-214bpm on 10/24 at 0700.  First episode required 2 ATP sequences, with brief conversion of rhythm.  The two subsequent episodes each required ATP x 1, with one episode diverting a shock.  3 NSVT episodes since 10/9.    Labs:  CBC RESULTS:  Lab Results   Component Value Date    WBC 11.2 (H) 11/20/2018    RBC 4.45 11/20/2018    HGB 12.1 (L) 11/20/2018    HCT 39.2 (L) 11/20/2018    MCV 88 11/20/2018    MCH 27.2 11/20/2018    MCHC 30.9 (L) 11/20/2018    RDW 14.6 11/20/2018     11/20/2018       CMP RESULTS:  Lab Results   Component Value Date     11/20/2018    POTASSIUM 4.0 11/20/2018    CHLORIDE 97 11/20/2018    CO2 28 11/20/2018    ANIONGAP 9 11/20/2018     (H) 11/20/2018    BUN 50 (H) 11/20/2018    CR 1.51 (H) 11/20/2018    GFRESTIMATED 47 (L) 11/20/2018    GFRESTBLACK 57 (L) 11/20/2018    QAMAR 8.8 11/20/2018    BILITOTAL 0.6 11/20/2018    ALBUMIN 4.0 11/20/2018    ALKPHOS 88 11/20/2018    ALT 25 11/20/2018    AST 22 11/20/2018        INR RESULTS:  Lab Results   Component Value Date    INR 1.93 (H) 11/20/2018       Lab Results   Component Value Date    MAG 2.1 06/30/2017     Lab Results   Component Value Date    NTBNPI 4216 (H) 06/15/2017     Lab Results   Component Value Date    NTBNP 1351 (H) 07/06/2018       Assessment and Plan:   Mr. Jim Willingham is a 63yr old male with a history of NICM (LVEF 10-20%) s/p HMII LVAD (6/19/17, c/b arrhythmias, WALTER, and left pleural effusion), RV dysfunction, CKD, DMII, CECILIA, obesity, COPD, asthma, and HTN who presents to clinic for routine follow  up.    Labs today show stable electrolytes, kidney function, liver function, and blood counts.  INR 1.93.    As Mr. Willingham has reported progressive fatigue, will add iron panel to labs drawn today.  Will set him up for iron infusions vs oral iron supplementation, pending the results.    He does appear euvolemic if not slightly hypovolemic today, so advised that he continue his bumex 2mg twice daily.  Asked that he continue to work on his weight loss, as his BMI (>35) currently preclude him from heart transplantation.    His MAPs remain elevated today.  And as his device check shows several episodes of VF/NSVT, asked that he increase his metoprolol XL to 50mg in the morning and 25mg in the evening.      Chronic systolic heart failure secondary to NICM  Moderate RV dysfunction  Stage D  NYHA Class III  ACEi/ARB:  Yes, losartan 37.5mg daily  BB:  Yes, increase metoprolol XL to 50mg in the morning and 25mg in the evening  Aldosterone antagonist:  Yes, spironolactone 25mg daily  SCD prophylaxis:  CRT-d  Fluid status euvolemic/slightly hypovolemic, continue bumex 2mg BID  Follow-up:  RTC to see Dr. Montgomery in December      S/p HMII LVAD (6/19/17)  Antiplatelet:  Aspirin 81mg daily  Anticoagulation:  Warfarin, dosed per INR clinic with goal INR 2-3.  INR today 1.93.  MAP:  88 today in clinic  LDH:  362, stable      Other:  VT/VF:  Increasing BB, as noted above.  pAF:  Remains on BB and anticoagulation.  HR regular today.  HTN:  MAPs remain elevated, increasing BB as noted above.  Continues on losartan 37.5mg daily.  Chronic pain:  He notes chronic pain in his shoulder, and states that he takes 4 tramadol tablets every night.  Advised that he should try taking 2 tabs in the afternoon and 2 in the evening, and recommended that he be evaluated in the pain clinic for ongoing care.  CKD:  Creatinine remains stable.  MICHAEL:  Repeating iron panel today, will consider iron supplementation (IV vs po) pending results.  Obesity, with  BMI > 35:  Encouraged continued dietary monitoring as well as increased aerobic activity to reach weight loss goals.      The above was reviewed with Mr. Willingham, who verbalized understanding and will call with further questions/concerns.    30 minutes spent face-to-face with patient, with >50% in counseling and/or coordination of care as described above.    Shelia Means, DNP, APRN, FNP-BC  Advanced Heart Failure Nurse Practitioner  [facility]  Patient Care Team:  Jovany Salas MD as PCP - General (Internal Medicine)  Elfego Hayden MD as MD (Internal Medicine)  Catalina Montgomery MD as Referring Physician (Cardiology)  Rajani Abdi, RN as Registered Nurse  CATALINA MONTGOMERY

## 2018-11-20 NOTE — PROGRESS NOTES
Pt called VAD coordinator to report an ICD shock. Instructed pt to send a remote transmission and call the device clinic. Pt was woken from sleep by the shock and was asymptomatic following the shock. VAD parameters WNL and no alarms. Spoke with Charleen Owen RN shortly after that and she confirmed that pt did received a shock for VT with a rate of 220bpm. Called Shelia Means NP to discuss. Received instructions for pt to hold diuretic tonight and decrease dose to 1mg BID tomorrow and Thursday, and check in with VAD coordinator on call on Friday. Pt also instructed to decrease potassium to 20mEq daily, and confirmed change to Toprol XL dose to 50mg qam and 25mg qpm. Pt verbalized understanding.

## 2018-11-20 NOTE — MR AVS SNAPSHOT
MRN:[id number]                      After Visit Summary   11/20/2018    Jim Willingham    MRN: [id number]           Visit Information        Provider Department      11/20/2018 10:00 AM 1, Uc Cv Device [facility]        Your next 10 appointments already scheduled     Dec 07, 2018  9:30 AM CST   Lab with UC LAB   [facility] ([facility])    [address]  1st Floor  [place] [facility] [id number] [phone]            Dec 07, 2018 10:00 AM CST   (Arrive by 9:45 AM)   Implanted Defibulator with Uc Cv Device 1   [facility] ([facility])    [address]  3rd Floor  [id number]               Dec 07, 2018 10:30 AM CST   (Arrive by 10:15 AM)   Ventricular Assist Device with Delisa Montgomery MD   [facility] ([facility])    [address]  [address]  [place] [facility] [id number] [phone]              Care Instructions    It was a pleasure to see you in clinic today.  Please do not hesitate to call with any questions or concerns.  We look forward to seeing you in clinic at your next device check in 3 months.    Michelle Nash, RN, BSN  Electrophysiology Nurse Clinician  [facility]    During Business Hours Please Call:  [phone]  After Hours Please Call:  [phone] - select option #4 and ask for job code [phone]         Care EveryWhere ID     This is your Care EveryWhere ID. This could be used by other organizations to access your [place] medical records  [id number]        Equal Access to Services     JERROD VELASQUEZ AH: Hadii aad ku hadasho Soomaali, waaxda luqadaha, qaybta kaalmada adeegyada, waxwarren oseasin haynirn jaidamichael padilla'haresh . So [facility] [phone].    ATENCIÓN: Si habla español, tiene a roger disposición servicios gratuitos de asistencia lingüística. Llame al [phone].    We comply with applicable federal civil rights laws and [place] laws. We do  not discriminate on the basis of race, color, national origin, age, disability, sex, sexual orientation, or gender identity.

## 2018-11-20 NOTE — PATIENT INSTRUCTIONS
Medications:  1. Please increase you metoprolol to 50mg in the morning and 25mg in the evening.    Follow-up:  1. Please call the [facility] to set up an appointment  2. Please keep an eye on the fatty deposit on your skin of your abdomen  3. Please get labs and an ICD check before your next appoitment with Dr. Montgomery      Page the VAD Coordinator on call if you gain more than 3 lb in a day or 5 in a week. Please also page if you feel unwell or have alarms.     Great to see you in clinic today. To Page the VAD Coordinator on call, dial [phone] option #4 and ask to speak to the VAD coordinator on call.

## 2018-11-20 NOTE — NURSING NOTE
1). PUMP DATA  Primary controller serial number: [id number]    HM II:   Flow: 6.9 L/min,    Speed: 9400 RPMs,     PI: 5.4 ,  Power: 6.8 Manuel,      Primary controller   Back up battery: Patient use: 53 (these were in the past and explained), Replace in: 13  Months     Data downloaded: No   Equipment and driveline assessed for damage: Yes     Back up : Serial number: [id number]  Back up battery: Patient use: 4 Replace in: 15  Months  Programmed settings identical to the settings on the primary controller :Yes      Education complete: Yes   Charge the BACKUP controller s backup battery every 6 months  Perform a self test on BACKUP every 6 months  Change the MPU s batteries every 6 months:Yes  Have equipment serviced yearly (if applicable):Yes      2). ALARMS  Alarms reported by patient since last pump evaluation: No  Alarms or other finding noted during pump data history and alarm download: No    Action Taken:  Reviewed data with patient: Yes      3). DRESSING CHANGE / DRIVELINE ASSESSMENT  Dressing change completed today: No  Appearance of Driveline site: weekly dsng    Driveline stabilization: Method: [employer]  Teaching reinforced on need for stabilization of Driveline.    4).Pt. Education    D:  Pt education provided.  I:  Pt educated on the following topics:  1. When to call 911.  Reviewed emergency situations.  2. What to do if my VAD Coordinator is not returning my call.  3. When to page the VAD Coordinator on Call (handout provided w/ frequent symptoms to report).  4. Pt practice paged the VAD Coordinator on Call.   5. Pt given page of stickers with the number for the VAD Coordinator on Call.  6. Pt given list of frequently used resources and their numbers.  Reviewed when to call each resource.  (Social Work, Financial Counselor, Coumadin Clinic, Device Nurse, ).  A:  Pt verbalized understanding.  P:  VAD Coordinator available for questions or concerns.  Will continue VAD education.

## 2018-11-20 NOTE — PROGRESS NOTES
Preliminary Device Interrogation Results.  Final physician signed paceart report to be scanned and attached.    [employer] Care Alert received for 1 ICD shock delivered today @ 1602.  Remote ICD transmission received and reviewed.  Device transmission sent per MD orders.  Patient has a Medtronic multi lead ICD.  Normal ICD function.  1 episode of VT recorded today at 1602 - 25 sec, 222 bpm, 1 burst of ATP and 1 35J ICD shock delivered with successful conversion of the ventricular arrhythmia.  Presenting EGM = AS-BVP @ 105 bpm.  AP = 1%.   = 95.5%.  BVP = 92.1%.  VSR pace = 4.1%.  OptiVol fluid index is near baseline.  Estimated battery longevity to MARVA = 3.6 years.  No short v-v intervals recorded.  RV lead impedance trends appear stable.  Patient called device clinic to report that he was laying down and received a shock from his ICD.  Patient denies symptoms prior to and after ICD shock.  Patient was seen in clinic for a device check and appt with Miguelangel Bass NP today.  Patient reports that after his ICD shock he called his LVAD coordinator who instructed him to send a remote ICD transmission.  Rajani Abdi RN, LVAD Coordinator notified of interrogation results.  Rajani Abdi will notify Shelia Means NP and/or Dr. Montgomery and call patient with plan of care.    Remote ICD transmission

## 2018-11-20 NOTE — NURSING NOTE
Chief Complaint   Patient presents with     RECHECK     Acute on chronic systolic congestive heart failure      Emmanuel Villegas CMA at 10:38 AM on 11/20/2018     Medications and vitals reviewed with patient and confirmed.

## 2018-11-20 NOTE — MR AVS SNAPSHOT
Jim Willingham   11/20/2018   Anticoagulation Therapy Visit    Description:  63 year old male   Provider:  Delisa Hillman RN   Department:  [facility]           INR as of 11/20/2018     Today's INR 1.93!      Anticoagulation Summary as of 11/20/2018     INR goal 2.0-3.0   Today's INR 1.93!   Full warfarin instructions 11/20: 10 mg; Otherwise 10 mg on Fri; 7.5 mg all other days   Next INR check 11/27/2018    Indications   LVAD (left ventricular assist device) present (H) [Z95.811]  Long-term (current) use of anticoagulants [Z79.01] [Z79.01]         November 2018 Details    Sun Mon Tue Wed Thu Fri Sat         1               2               3                 4               5               6               7               8               9               10                 11               12               13               14               15               16               17                 18               19               20      10 mg   See details      21      7.5 mg         22      7.5 mg         23      10 mg         24      7.5 mg           25      7.5 mg         26      7.5 mg         27            28               29               30                 Date Details   11/20 This INR check       Date of next INR:  11/27/2018         How to take your warfarin dose     To take:  7.5 mg Take 1.5 of the 5 mg tablets.    To take:  10 mg Take 2 of the 5 mg tablets.

## 2018-11-20 NOTE — PROGRESS NOTES
Advanced Heart Failure Clinic -- LVAD Follow Up  November 20, 2018    HPI:   Mr. Jim Willingham is a 63yr old male with a history of NICM (LVEF 10-20%) s/p HMII LVAD (6/19/17, c/b arrhythmias, AWLTER, and left pleural effusion), RV dysfunction, CKD, DMII, CECILIA, obesity, COPD, asthma, and HTN who presents to clinic for routine follow up.    Mr. Fernandez states that he has felt well overall.  His device check today showed multiple episodes of VF on 10/24, where he received ATP.  He did not get shocked.  He states that he felt well, and did not have any symptoms during this time and was making breakfast for his grandkids.  He notes that he has been busy with his family, as his son and his family recently moved in with him, and he has adopted his great-grandchild.  He has not been exercising regularly, but notes that his daily activities keep him busy.  He tends to feel worn down by the end of the day, and notes that he is more tired and fatigued than usual.  He is wondering if his iron levels are low again.  He is a little short of breath today, and thinks that he may be developing a URI.  His weight has remained stable at 265-268#, and he notes some fluid retention in his abdomen.  He states that his weight went down to 256# after his last visit, where he was instructed to take one dose of metolazone.  He has been cooking more and trying to watch his diet to lose weight, and has been strictly monitoring his salt intake.  He is not limiting his fluid intake.  He notes ongoing dizziness/lightheadedness, which now comes on when he lies down.  He continues to note tingling in his fingers secondary to carpal tunnel syndrome, which has improved since starting gabapentin.  He had a mild episode of gout about one month ago, which resolved within one day and has not recurred.  He otherwise denies chest pain, palpitations, PND, orthopnea, new headaches, acute vision changes, weakness, fevers, chills, cough, sore throat, driveline  changes, nausea, vomiting, diarrhea, constipation, and signs of bleeding.      PAST MEDICAL HISTORY:  Past Medical History:   Diagnosis Date     Benign essential hypertension 5/11/2017     Cardiomyopathy, unspecified 5/8/2017     CKD (chronic kidney disease) stage 3, GFR 30-59 ml/min 5/11/2017     Depression 5/11/2017     Diabetes mellitus (H) 5/11/2017     H/O gastric bypass 5/11/2017     ICD (implantable cardioverter-defibrillator), biventricular, in situ 5/11/2017     LVAD (left ventricular assist device) present (H)      NICM (nonischemic cardiomyopathy) (H)/ EF 20% 5/11/2017    ECHO: LVEDd. 7.66 cm, Restrictive pattern , Severe mitral valve regurgitation     CECILIA (obstructive sleep apnea) 5/11/2017     Paroxysmal atrial fibrillation (H) 5/11/2017     Paroxysmal VT (H) 5/11/2017     Uncomplicated asthma      Vitamin B12 deficiency (non anemic) 5/11/2017       FAMILY HISTORY:  Family History   Problem Relation Age of Onset     Cerebrovascular Disease Mother      Diabetes Mother      Hypertension Mother      Coronary Artery Disease Father      Type 2 Diabetes Father        SOCIAL HISTORY:  Social History     Social History     Marital status:      Spouse name: N/A     Number of children: N/A     Years of education: N/A     Social History Main Topics     Smoking status: Former Smoker     Quit date: 1995     Smokeless tobacco: Never Used     Alcohol use No     Drug use: No     Sexual activity: Yes     Partners: Female     Other Topics Concern     Parent/Sibling W/ Cabg, Mi Or Angioplasty Before 65f 55m? No     Social History Narrative       CURRENT MEDICATIONS:  Current Outpatient Prescriptions   Medication Sig Dispense Refill     acetaminophen (TYLENOL) 325 MG tablet Take 2 tablets (650 mg) by mouth every 6 hours 100 tablet      albuterol (ALBUTEROL) 108 (90 BASE) MCG/ACT Inhaler Inhale 2 puffs into the lungs every 4 hours as needed for shortness of breath / dyspnea or wheezing       allopurinol (ZYLOPRIM)  100 MG tablet Take 1 tablet (100 mg) by mouth daily 60 tablet 5     aspirin 81 MG chewable tablet 1 tablet (81 mg) by Oral or Feeding Tube route daily 36 tablet      bumetanide (BUMEX) 1 MG tablet Take 2 tablets (2 mg) by mouth 2 times daily 120 tablet 11     citalopram (CELEXA) 20 MG tablet Take 20 mg by mouth daily       cyanocobalamin (VITAMIN B12) 1000 MCG/ML injection Inject 1,000 mcg into the muscle every 30 days       fluticasone-vilanterol (BREO ELLIPTA) 200-25 MCG/INH oral inhaler Inhale 1 puff into the lungs daily       gabapentin (NEURONTIN) 300 MG capsule Take 1 capsule (300 mg) by mouth 3 times daily 90 capsule      ipratropium - albuterol 0.5 mg/2.5 mg/3 mL (DUONEB) 0.5-2.5 (3) MG/3ML neb solution Take 3 mLs by nebulization every 4 hours as needed for shortness of breath / dyspnea or wheezing       losartan (COZAAR) 25 MG tablet Take 1.5 tablets (37.5 mg) by mouth daily 90 tablet 3     metFORMIN (GLUCOPHAGE) 500 MG tablet Take 0.5 tablets (250 mg) by mouth 2 times daily (with meals) 60 tablet 0     metolazone (ZAROXOLYN) 2.5 MG tablet Take 1 tablet (2.5 mg) by mouth once for 1 dose 1 tablet 0     metoprolol succinate (TOPROL-XL) 25 MG 24 hr tablet Take 1 tablet (25 mg) by mouth 2 times daily 180 tablet 5     montelukast (SINGULAIR) 10 MG tablet Take 10 mg by mouth At Bedtime       pantoprazole (PROTONIX) 40 MG EC tablet Take 1 tablet (40 mg) by mouth every morning 60 tablet 5     polyethylene glycol (MIRALAX/GLYCOLAX) Packet Take 17 g by mouth daily as needed for constipation (for no bm in 1 day) 7 packet      potassium chloride (K-TAB,KLOR-CON) 10 MEQ tablet Take 2 tablets (20 mEq) by mouth daily 120 tablet 5     potassium chloride (KLOR-CON) 20 MEQ Packet Take 20 mEq by mouth daily 60 packet 5     predniSONE (DELTASONE) 20 MG tablet Take 0.5 tablets (10 mg) by mouth daily 20 tablet 0     senna-docusate (SENOKOT-S;PERICOLACE) 8.6-50 MG per tablet Take 2 tablets by mouth 2 times daily as needed for  "constipation (Patient not taking: Reported on 7/6/2018) 100 tablet 0     spironolactone (ALDACTONE) 25 MG tablet Take 1 tablet (25 mg) by mouth daily 30 tablet 5     traMADol (ULTRAM) 50 MG tablet Take 2 tablets (100 mg) by mouth every 6 hours as needed for moderate pain or breakthrough pain 28 tablet      umeclidinium (INCRUSE ELLIPTA) 62.5 MCG/INH oral inhaler Inhale 1 puff into the lungs daily       warfarin (COUMADIN) 3 MG tablet Use as directed. 30 tablet 0     warfarin (COUMADIN) 5 MG tablet Take 5 - 7.5mg daily or as directed by coumadin clinic. 90 tablet 5     zinc sulfate (ZINCATE) 220 (50 ZN) MG capsule Take 220 mg by mouth 2 times daily         ROS:   Constitutional: No fever, chills, or sweats. Reports stable weight.   ENT: No visual disturbance, ear ache, epistaxis, sore throat.   Allergies/Immunologic: Negative.   Respiratory: As per HPI.  Cardiovascular: As per HPI.   GI: No nausea, vomiting, hematemesis, melena, or hematochezia.   : No urinary frequency, dysuria, or hematuria.   Integument: Negative.   Psychiatric: as per HPI.   Neuro: As per HPI.  Endocrinology: Negative.   Musculoskeletal: As per HPI.    EXAM:  BP (!) 88/0   Pulse 61   Resp 18   Ht 1.727 m (5' 8\")   Wt 120.2 kg (265 lb)   SpO2 95%   BMI 40.29 kg/m    General: appears comfortable, alert and articulate  Head: normocephalic, atraumatic  Eyes: anicteric sclera, EOMI  Neck: no adenopathy  Orophyarynx: moist mucosa, no lesions, dentition intact  Heart: LVAD hum present, JVD negative when sitting upright  Lungs: clear, no rales or wheezing  Abdomen: soft, non-tender, bowel sounds present, no hepatosplenomegaly  Extremities: no clubbing, cyanosis, or LE edema  Neurological: normal speech and affect, no gross motor deficits  Integument:  Driveline site covered, with dressing c/d/i.  No open lesions, no jaundice.    LVAD interrogation reviewed with LVAD coordinator, agree with findings.  No LVAD alarms or signs of pump malfunction.  "     Device interrogation reviewed.  Note 3 episodes in the VF zone with HRs 207-214bpm on 10/24 at 0700.  First episode required 2 ATP sequences, with brief conversion of rhythm.  The two subsequent episodes each required ATP x 1, with one episode diverting a shock.  3 NSVT episodes since 10/9.    Labs:  CBC RESULTS:  Lab Results   Component Value Date    WBC 11.2 (H) 11/20/2018    RBC 4.45 11/20/2018    HGB 12.1 (L) 11/20/2018    HCT 39.2 (L) 11/20/2018    MCV 88 11/20/2018    MCH 27.2 11/20/2018    MCHC 30.9 (L) 11/20/2018    RDW 14.6 11/20/2018     11/20/2018       CMP RESULTS:  Lab Results   Component Value Date     11/20/2018    POTASSIUM 4.0 11/20/2018    CHLORIDE 97 11/20/2018    CO2 28 11/20/2018    ANIONGAP 9 11/20/2018     (H) 11/20/2018    BUN 50 (H) 11/20/2018    CR 1.51 (H) 11/20/2018    GFRESTIMATED 47 (L) 11/20/2018    GFRESTBLACK 57 (L) 11/20/2018    QAMAR 8.8 11/20/2018    BILITOTAL 0.6 11/20/2018    ALBUMIN 4.0 11/20/2018    ALKPHOS 88 11/20/2018    ALT 25 11/20/2018    AST 22 11/20/2018        INR RESULTS:  Lab Results   Component Value Date    INR 1.93 (H) 11/20/2018       Lab Results   Component Value Date    MAG 2.1 06/30/2017     Lab Results   Component Value Date    NTBNPI 4216 (H) 06/15/2017     Lab Results   Component Value Date    NTBNP 1351 (H) 07/06/2018       Assessment and Plan:   Mr. Jim Willingham is a 63yr old male with a history of NICM (LVEF 10-20%) s/p HMII LVAD (6/19/17, c/b arrhythmias, WALTER, and left pleural effusion), RV dysfunction, CKD, DMII, CECILIA, obesity, COPD, asthma, and HTN who presents to clinic for routine follow up.    Labs today show stable electrolytes, kidney function, liver function, and blood counts.  INR 1.93.    As Mr. Willingham has reported progressive fatigue, will add iron panel to labs drawn today.  Will set him up for iron infusions vs oral iron supplementation, pending the results.    He does appear euvolemic if not slightly hypovolemic today,  so advised that he continue his bumex 2mg twice daily.  Asked that he continue to work on his weight loss, as his BMI (>35) currently preclude him from heart transplantation.    His MAPs remain elevated today.  And as his device check shows several episodes of VF/NSVT, asked that he increase his metoprolol XL to 50mg in the morning and 25mg in the evening.      Chronic systolic heart failure secondary to NICM  Moderate RV dysfunction  Stage D  NYHA Class III  ACEi/ARB:  Yes, losartan 37.5mg daily  BB:  Yes, increase metoprolol XL to 50mg in the morning and 25mg in the evening  Aldosterone antagonist:  Yes, spironolactone 25mg daily  SCD prophylaxis:  CRT-d  Fluid status euvolemic/slightly hypovolemic, continue bumex 2mg BID  Follow-up:  RTC to see Dr. Montgomery in December      S/p HMII LVAD (6/19/17)  Antiplatelet:  Aspirin 81mg daily  Anticoagulation:  Warfarin, dosed per INR clinic with goal INR 2-3.  INR today 1.93.  MAP:  88 today in clinic  LDH:  362, stable      Other:  VT/VF:  Increasing BB, as noted above.  pAF:  Remains on BB and anticoagulation.  HR regular today.  HTN:  MAPs remain elevated, increasing BB as noted above.  Continues on losartan 37.5mg daily.  Chronic pain:  He notes chronic pain in his shoulder, and states that he takes 4 tramadol tablets every night.  Advised that he should try taking 2 tabs in the afternoon and 2 in the evening, and recommended that he be evaluated in the pain clinic for ongoing care.  CKD:  Creatinine remains stable.  MICHAEL:  Repeating iron panel today, will consider iron supplementation (IV vs po) pending results.  Obesity, with BMI > 35:  Encouraged continued dietary monitoring as well as increased aerobic activity to reach weight loss goals.      The above was reviewed with Mr. Willingham, who verbalized understanding and will call with further questions/concerns.    30 minutes spent face-to-face with patient, with >50% in counseling and/or coordination of care as described  above.      Shelia Means, DNP, APRN, FNP-BC  Advanced Heart Failure Nurse Practitioner  [facility]  Patient Care Team:  Jovany Salas MD as PCP - General (Internal Medicine)  Elfego Hayden MD as MD (Internal Medicine)  Catalina Montgomery MD as Referring Physician (Cardiology)  Rajani Abdi, RN as Registered Nurse  CATALINA MONTGOMERY

## 2018-11-20 NOTE — MR AVS SNAPSHOT
After Visit Summary   11/20/2018    Jim Willingham    MRN: [id number]           Patient Information     Date Of Birth          1955        Visit Information        Provider Department      11/20/2018 10:30 AM Shelia Means NP [facility]        Today's Diagnoses     LVAD (left ventricular assist device) present (H)    -  1    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, without long-term current use of insulin, unspecified CKD stage (H)        Chronic systolic congestive heart failure (H)          Care Instructions    Medications:  1. Please increase you metoprolol to 50mg in the morning and 25mg in the evening.    Follow-up:  1. Please call the [facility] to set up an appointment  2. Please keep an eye on the fatty deposit on your skin of your abdomen  3. Please get labs and an ICD check before your next appoitment with Dr. Montgomery      Page the VAD Coordinator on call if you gain more than 3 lb in a day or 5 in a week. Please also page if you feel unwell or have alarms.     Great to see you in clinic today. To Page the VAD Coordinator on call, dial [phone] option #[phone] and ask to speak to the VAD coordinator on call.             Follow-ups after your visit        Additional Services     PAIN MANAGEMENT REFERRAL       Your provider has referred you to: N: [facility] [place] [phone]   [email or web address]       Please call clinic directly to schedule appointment.    **ANY DIAGNOSTIC TESTS THAT ARE NOT IN [facility] SHOULD BE SENT TO THE PAIN CENTER**    REGARDING OPIOID MEDICATIONS:  The discussion of opioids management, appropriateness of therapy, and dosing will be discussed in patients being seen for evaluation.  The pain management clinics are not long-term prescribing clinics, with transition of prescribing of medications ultimately going back to the referring provider/PCP.  If prescribing is taken  over at the pain clinic, it is in actively involved patients whom are appropriate for opioids, urine drug screening is completed, and long-term prescribing plan has been determined.  Therefore, we will not be automatically taking over prescribing at the patient's first visit.  Is this agreeable to you? agrees.     Please be aware that coverage of these services is subject to the terms and limitations of your health insurance plan.  Call member services at your health plan with any benefit or coverage questions.      Please bring the following with you to your appointment:    (1) Any X-Rays, CTs or MRIs which have been performed.  Contact the facility where they were done to arrange for  prior to your scheduled appointment.    (2) List of current medications   (3) This referral request   (4) Any documents/labs given to you for this referral                  Your next 10 appointments already scheduled     Dec 07, 2018  9:30 AM CST   Lab with  LAB    [facility] ([facility])    [address] [place] [address]  1st Floor  [facility] [id number] [phone]            Dec 07, 2018 10:00 AM CST   (Arrive by 9:45 AM)   Implanted Defibulator with Uc Cv Device 1   [facility] ([facility])    [address] [place] [address]  3rd Floor  [id number]               Dec 07, 2018 10:30 AM CST   (Arrive by 10:15 AM)   Ventricular Assist Device with Delisa Montgomery MD   [facility] ([facility])    [address] [place] [address]  [address]  [facility] [place] [address] [phone]              Future tests that were ordered for you today     Open Future Orders        Priority Expected Expires Ordered    Follow-Up with Device Clinic - 3 months Routine 2/18/2019 5/19/2019 11/20/2018            Who to contact     If you have questions or need follow up information about today's clinic visit or your schedule please contact [facility] directly at  "[phone].  Normal or non-critical lab and imaging results will be communicated to you by MyChart, letter or phone within 4 business days after the clinic has received the results. If you do not hear from us within 7 days, please contact the clinic through MyChart or phone. If you have a critical or abnormal lab result, we will notify you by phone as soon as possible.  Submit refill requests through TelePharm or call your pharmacy and they will forward the refill request to us. Please allow 3 business days for your refill to be completed.          Additional Information About Your Visit        Care EveryWhere ID     This is your Care EveryWhere ID. This could be used by other organizations to access your [place] medical records  [id number]        Your Vitals Were     Pulse Respirations Height Pulse Oximetry BMI (Body Mass Index)       61 18 1.727 m (5' 8\") 95% 40.29 kg/m2        Blood Pressure from Last 3 Encounters:   11/20/18 (!) 88/0   08/16/18 125/87   08/06/18 (!) 88/0    Weight from Last 3 Encounters:   11/20/18 120.2 kg (265 lb)   08/28/18 126.9 kg (279 lb 11.2 oz)   08/16/18 126.6 kg (279 lb 3.2 oz)              We Performed the Following     (66313) INTERROGATE VENT ASSIST DEVICE, IN PERSON, DAGO JETER ANALYSIS PARAMETERS     Ferritin     Iron and iron binding capacity     PAIN MANAGEMENT REFERRAL     Soluble transferrin receptor          Today's Medication Changes          These changes are accurate as of 11/20/18 11:29 AM.  If you have any questions, ask your nurse or doctor.               Start taking these medicines.        Dose/Directions    losartan 25 MG tablet   Commonly known as:  COZAAR   Used for:  LVAD (left ventricular assist device) present (H)   Started by:  Shelia Means, BARRIE        Dose:  37.5 mg   Take 1.5 tablets (37.5 mg) by mouth daily   Quantity:  90 tablet   Refills:  1         These medicines have changed or have updated prescriptions.        Dose/Directions    metFORMIN 500 MG " tablet   Commonly known as:  GLUCOPHAGE   This may have changed:  how much to take   Used for:  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, without long-term current use of insulin, unspecified CKD stage (H)   Changed by:  Shelia Means NP        Dose:  500 mg   Take 1 tablet (500 mg) by mouth 2 times daily (with meals)   Quantity:  60 tablet   Refills:  0       metoprolol succinate 25 MG 24 hr tablet   Commonly known as:  TOPROL-XL   This may have changed:    - how much to take  - how to take this  - when to take this  - additional instructions   Used for:  Chronic systolic congestive heart failure (H), LVAD (left ventricular assist device) present (H)   Changed by:  Shelia Means NP        Take 50mg in the morning and 25mg in the evening   Quantity:  180 tablet   Refills:  5       potassium chloride 10 MEQ tablet   Commonly known as:  K-TAB,KLOR-CON   This may have changed:  Another medication with the same name was removed. Continue taking this medication, and follow the directions you see here.   Used for:  LVAD (left ventricular assist device) present (H), Chronic systolic congestive heart failure (H)   Changed by:  Shelia Means NP        Dose:  20 mEq   Take 2 tablets (20 mEq) by mouth daily   Quantity:  120 tablet   Refills:  5         Stop taking these medicines if you haven't already. Please contact your care team if you have questions.     polyethylene glycol Packet   Commonly known as:  MIRALAX/GLYCOLAX   Stopped by:  Shelia Means NP           senna-docusate 8.6-50 MG per tablet   Commonly known as:  SENOKOT-S;PERICOLACE   Stopped by:  Shelia Means NP                Where to get your medicines      These medications were sent to [facility] [address] [place] [address] [place] - [address] [place] [facility] [address] [place] [address]     Phone:  [phone]     losartan 25 MG tablet    metoprolol succinate 25 MG 24 hr  tablet                Primary Care Provider Office Phone # Fax #    Jovany Salas [phone]       [address] [address]        Equal Access to Services     JERROD VELASQUEZ : Hadii aad ku haddorotao Soomaali, waaxda luqadaha, qaybta kaalmada adeegyada, waxwarren osunan lynnette del angel laSavanahharesh ely. So [facility] [phone].    ATENCIÓN: Si habla español, tiene a roger disposición servicios gratuitos de asistencia lingüística. [facility] [phone].    We comply with applicable federal civil rights laws and [place] laws. We do not discriminate on the basis of race, color, national origin, age, disability, sex, sexual orientation, or gender identity.            Thank you!     Thank you for choosing [facility]  for your care. Our goal is always to provide you with excellent care. Hearing back from our patients is one way we can continue to improve our services. Please take a few minutes to complete the written survey that you may receive in the mail after your visit with us. Thank you!             Your Updated Medication List - Protect others around you: Learn how to safely use, store and throw away your medicines at www.disposemymeds.org.          This list is accurate as of 11/20/18 11:29 AM.  Always use your most recent med list.                   Brand Name Dispense Instructions for use Diagnosis    acetaminophen 325 MG tablet    TYLENOL    100 tablet    Take 2 tablets (650 mg) by mouth every 6 hours    Acute post-operative pain       ALDACTONE 25 MG tablet   Generic drug:  spironolactone     30 tablet    Take 1 tablet (25 mg) by mouth daily    LVAD (left ventricular assist device) present (H), Chronic systolic congestive heart failure (H), Nerve pain, Acute idiopathic gout, unspecified site       allopurinol 100 MG tablet    ZYLOPRIM    60 tablet    Take 1 tablet (100 mg) by mouth daily    Acute idiopathic gout, unspecified site       aspirin 81 MG chewable  tablet     36 tablet    1 tablet (81 mg) by Oral or Feeding Tube route daily    LVAD (left ventricular assist device) present (H)       BREO ELLIPTA 200-25 MCG/INH inhaler   Generic drug:  fluticasone-vilanterol      Inhale 1 puff into the lungs daily        bumetanide 1 MG tablet    BUMEX    120 tablet    Take 2 tablets (2 mg) by mouth 2 times daily    LVAD (left ventricular assist device) present (H)       celeXA 20 MG tablet   Generic drug:  citalopram      Take 20 mg by mouth daily        cyanocobalamin 1000 MCG/ML injection    VITAMIN B12     Inject 1,000 mcg into the muscle every 30 days        gabapentin 300 MG capsule    NEURONTIN    90 capsule    Take 1 capsule (300 mg) by mouth 3 times daily    Nerve pain, LVAD (left ventricular assist device) present (H), Acute idiopathic gout, unspecified site, Chronic systolic congestive heart failure (H)       INCRUSE ELLIPTA 62.5 MCG/INH inhaler   Generic drug:  umeclidinium      Inhale 1 puff into the lungs daily        ipratropium - albuterol 0.5 mg/2.5 mg/3 mL 0.5-2.5 (3) MG/3ML neb solution    DUONEB     Take 3 mLs by nebulization every 4 hours as needed for shortness of breath / dyspnea or wheezing        losartan 25 MG tablet    COZAAR    90 tablet    Take 1.5 tablets (37.5 mg) by mouth daily    LVAD (left ventricular assist device) present (H)       metFORMIN 500 MG tablet    GLUCOPHAGE    60 tablet    Take 1 tablet (500 mg) by mouth 2 times daily (with meals)    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, without long-term current use of insulin, unspecified CKD stage (H)       metolazone 2.5 MG tablet    ZAROXOLYN    1 tablet    Take 1 tablet (2.5 mg) by mouth once for 1 dose    Chronic systolic congestive heart failure (H), LVAD (left ventricular assist device) present (H)       metoprolol succinate 25 MG 24 hr tablet    TOPROL-XL    180 tablet    Take 50mg in the morning and 25mg in the evening    Chronic systolic congestive  heart failure (H), LVAD (left ventricular assist device) present (H)       pantoprazole 40 MG EC tablet    PROTONIX    60 tablet    Take 1 tablet (40 mg) by mouth every morning    Gastroesophageal reflux disease without esophagitis       potassium chloride 10 MEQ tablet    K-TAB,KLOR-CON    120 tablet    Take 2 tablets (20 mEq) by mouth daily    LVAD (left ventricular assist device) present (H), Chronic systolic congestive heart failure (H)       predniSONE 20 MG tablet    DELTASONE    20 tablet    Take 0.5 tablets (10 mg) by mouth daily    Acute idiopathic gout, unspecified site, LVAD (left ventricular assist device) present (H), Nerve pain, Chronic systolic congestive heart failure (H)       PROAIR  (90 Base) MCG/ACT inhaler   Generic drug:  albuterol      Inhale 2 puffs into the lungs every 4 hours as needed for shortness of breath / dyspnea or wheezing        SINGULAIR 10 MG tablet   Generic drug:  montelukast      Take 10 mg by mouth At Bedtime        traMADol 50 MG tablet    ULTRAM    28 tablet    Take 2 tablets (100 mg) by mouth every 6 hours as needed for moderate pain or breakthrough pain    Acute post-operative pain       * warfarin 3 MG tablet    COUMADIN    30 tablet    Use as directed.    LVAD (left ventricular assist device) present (H)       * warfarin 5 MG tablet    COUMADIN    90 tablet    Take 5 - 7.5mg daily or as directed by coumadin clinic.    LVAD (left ventricular assist device) present (H), Chronic systolic congestive heart failure (H)       zinc sulfate 220 (50 Zn) MG capsule    ZINCATE     Take 220 mg by mouth 2 times daily        * Notice:  This list has 2 medication(s) that are the same as other medications prescribed for you. Read the directions carefully, and ask your doctor or other care provider to review them with you.

## 2018-11-20 NOTE — PROGRESS NOTES
Patient had LVAD placed on:   6/19/2017  Patient's current Aspirin dose: 81mg daily.  Increased aspirin to 325mg for one day, then resume 81mg daily  LVAD Protocol followed:  No, due to being only 7/100th below goal range.     ANTICOAGULATION FOLLOW-UP CLINIC VISIT    Patient Name:  Jim Willingham  Date:  11/20/2018  Contact Type:  Telephone    SUBJECTIVE:        OBJECTIVE    INR   Date Value Ref Range Status   11/20/2018 1.93 (H) 0.86 - 1.14 Final       ASSESSMENT / PLAN  INR assessment THER    Recheck INR In: 1 WEEK    INR Location Clinic      Anticoagulation Summary as of 11/20/2018     INR goal 2.0-3.0   Today's INR 1.93!   Warfarin maintenance plan 10 mg (5 mg x 2) on Fri; 7.5 mg (5 mg x 1.5) all other days   Full warfarin instructions 11/20: 10 mg; Otherwise 10 mg on Fri; 7.5 mg all other days   Weekly warfarin total 55 mg   Plan last modified Brendan Montoya RN (11/9/2018)   Next INR check 11/27/2018   Priority INR   Target end date     Indications   LVAD (left ventricular assist device) present (H) [Z95.811]  Long-term (current) use of anticoagulants [Z79.01] [Z79.01]         Anticoagulation Episode Summary     INR check location     Preferred lab     Send INR reminders to [facility]    Comments HIPPA Forms mailed 6/26/17  Labs drawn either at [facility] or [facility]  LVAD placed 6/19/17   II  ASA 81 mg daily      Anticoagulation Care Providers     Provider Role Specialty Phone number    Delisa Montgomery MD Responsible Cardiology [phone]            See the Encounter Report to view Anticoagulation Flowsheet and Dosing Calendar (Go to Encounters tab in chart review, and find the Anticoagulation Therapy Visit)  Left message for patient with results and dosing recommendations. Asked patient to call back to report any missed doses, falls, signs and symptoms of bleeding or clotting, any changes in health, medication, or diet. Asked patient to call back with any questions or  concerns.      Delisa Hillman RN

## 2018-11-20 NOTE — MR AVS SNAPSHOT
After Visit Summary   11/20/2018    Jim Willingham    MRN: [id number]           Patient Information     Date Of Birth          1955        Visit Information        Provider Department      11/20/2018 10:00 AM 1, Uc Cv Device [facility]        Today's Diagnoses     NICM (nonischemic cardiomyopathy) (H)/ EF 20%          Care Instructions    It was a pleasure to see you in clinic today.  Please do not hesitate to call with any questions or concerns.  We look forward to seeing you in clinic at your next device check in 3 months.    Michelle Nash, RN, BSN  Electrophysiology Nurse Clinician  [facility]    During Business Hours Please Call:  [phone]  After Hours Please Call:  [phone] - select option #4 and ask for job code [phone]          Follow-ups after your visit        Additional Services     Follow-Up with Device Clinic - 3 months                 Your next 10 appointments already scheduled     Dec 07, 2018  9:30 AM CST   Lab with  LAB    [facility] ([facility] [place])    [address]  1st Floor  [facility] [address] [phone]            Dec 07, 2018 10:00 AM CST   (Arrive by 9:45 AM)   Implanted Defibulator with Uc Cv Device 1   [facility] ([facility] [place])    [address]  3rd Floor  [address]               Dec 07, 2018 10:30 AM CST   (Arrive by 10:15 AM)   Ventricular Assist Device with Delisa Montgomery MD   [facility] ([facility])    [address] [place] [address]  [address] [place] [id number] [phone]              Future tests that were ordered for you today     Open Future Orders        Priority Expected Expires Ordered    Comprehensive metabolic panel Routine 12/7/2018 12/31/2018 11/20/2018    CBC with platelets Routine 12/7/2018 12/31/2018 11/20/2018    INR Routine 12/7/2018 12/31/2018 11/20/2018    Lactate Dehydrogenase Routine  12/7/2018 12/31/2018 11/20/2018    Follow-Up with Device Clinic - 3 months Routine 2/18/2019 5/19/2019 11/20/2018            Who to contact     Please call your clinic at No information on file. to:    Ask questions about your health    Make or cancel appointments    Discuss your medicines    Learn about your test results    Speak to your doctor            Additional Information About Your Visit        Care EveryWhere ID     This is your Care EveryWhere ID. This could be used by other organizations to access your [facility] medical records  [id number]         Blood Pressure from Last 3 Encounters:   11/20/18 (!) 88/0   08/16/18 125/87   08/06/18 (!) 88/0    Weight from Last 3 Encounters:   11/20/18 120.2 kg (265 lb)   08/28/18 126.9 kg (279 lb 11.2 oz)   08/16/18 126.6 kg (279 lb 3.2 oz)              We Performed the Following     Follow-Up with Device Clinic - 3 months     Follow-Up with Device Clinic-6 months     ICD DEVICE PROGRAMMING EVAL, MULTI LEAD ICD          Today's Medication Changes          These changes are accurate as of 11/20/18  6:29 PM.  If you have any questions, ask your nurse or doctor.               Start taking these medicines.        Dose/Directions    losartan 25 MG tablet   Commonly known as:  COZAAR   Used for:  LVAD (left ventricular assist device) present (H)   Started by:  Shelia Means NP        Dose:  37.5 mg   Take 1.5 tablets (37.5 mg) by mouth daily   Quantity:  90 tablet   Refills:  1         These medicines have changed or have updated prescriptions.        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, without long-term current use of insulin, unspecified CKD stage (H)   Changed by:  Shelia Means NP        Dose:  500 mg   Take 1 tablet (500 mg) by mouth 2 times daily (with meals)   Quantity:  60 tablet   Refills:   0       metoprolol succinate 25 MG 24 hr tablet   Commonly known as:  TOPROL-XL   This may have changed:    - how much to take  - how to take this  - when to take this  - additional instructions   Used for:  Chronic systolic congestive heart failure (H), LVAD (left ventricular assist device) present (H)   Changed by:  Shelia Means NP        Take 50mg in the morning and 25mg in the evening   Quantity:  180 tablet   Refills:  5       potassium chloride 10 MEQ tablet   Commonly known as:  K-TAB,KLOR-CON   This may have changed:  Another medication with the same name was removed. Continue taking this medication, and follow the directions you see here.   Used for:  LVAD (left ventricular assist device) present (H), Chronic systolic congestive heart failure (H)   Changed by:  Shelia Means NP        Dose:  20 mEq   Take 2 tablets (20 mEq) by mouth daily   Quantity:  120 tablet   Refills:  5         Stop taking these medicines if you haven't already. Please contact your care team if you have questions.     polyethylene glycol Packet   Commonly known as:  MIRALAX/GLYCOLAX   Stopped by:  Shelia Means NP           senna-docusate 8.6-50 MG per tablet   Commonly known as:  SENOKOT-S;PERICOLACE   Stopped by:  Shelia Means NP                Where to get your medicines      These medications were sent to [facility] [address] [place] [address] [address] AT [facility] [address]  [address] [address] [place] [address]     Phone:  [phone]     losartan 25 MG tablet    metoprolol succinate 25 MG 24 hr tablet                Primary Care Provider Office Phone # Fax #    Jovany Salas [phone]       [address] [address]        Equal Access to Services     [facility] RON AH: Rajeev Gastelum, wathierry lumarcie, qaybchavez kaalyu larson, kendra ely. So [facility] [phone].    ATENCIÓN: Cuco pool  español, tiene a roger disposición servicios gratuitos de asistencia lingüística. Guerline izquierdo [phone].    We comply with applicable federal civil rights laws and [place] laws. We do not discriminate on the basis of race, color, national origin, age, disability, sex, sexual orientation, or gender identity.            Thank you!     Thank you for choosing [facility]  for your care. Our goal is always to provide you with excellent care. Hearing back from our patients is one way we can continue to improve our services. Please take a few minutes to complete the written survey that you may receive in the mail after your visit with us. Thank you!             Your Updated Medication List - Protect others around you: Learn how to safely use, store and throw away your medicines at www.disposemymeds.org.          This list is accurate as of 11/20/18  6:29 PM.  Always use your most recent med list.                   Brand Name Dispense Instructions for use Diagnosis    acetaminophen 325 MG tablet    TYLENOL    100 tablet    Take 2 tablets (650 mg) by mouth every 6 hours    Acute post-operative pain       ALDACTONE 25 MG tablet   Generic drug:  spironolactone     30 tablet    Take 1 tablet (25 mg) by mouth daily    LVAD (left ventricular assist device) present (H), Chronic systolic congestive heart failure (H), Nerve pain, Acute idiopathic gout, unspecified site       allopurinol 100 MG tablet    ZYLOPRIM    60 tablet    Take 1 tablet (100 mg) by mouth daily    Acute idiopathic gout, unspecified site       aspirin 81 MG chewable tablet     36 tablet    1 tablet (81 mg) by Oral or Feeding Tube route daily    LVAD (left ventricular assist device) present (H)       BREO ELLIPTA 200-25 MCG/INH inhaler   Generic drug:  fluticasone-vilanterol      Inhale 1 puff into the lungs daily        bumetanide 1 MG tablet    BUMEX    120 tablet    Take 2 tablets (2 mg) by mouth 2 times daily    LVAD (left ventricular assist device)  present (H)       celeXA 20 MG tablet   Generic drug:  citalopram      Take 20 mg by mouth daily        cyanocobalamin 1000 MCG/ML injection    VITAMIN B12     Inject 1,000 mcg into the muscle every 30 days        gabapentin 300 MG capsule    NEURONTIN    90 capsule    Take 1 capsule (300 mg) by mouth 3 times daily    Nerve pain, LVAD (left ventricular assist device) present (H), Acute idiopathic gout, unspecified site, Chronic systolic congestive heart failure (H)       INCRUSE ELLIPTA 62.5 MCG/INH inhaler   Generic drug:  umeclidinium      Inhale 1 puff into the lungs daily        ipratropium - albuterol 0.5 mg/2.5 mg/3 mL 0.5-2.5 (3) MG/3ML neb solution    DUONEB     Take 3 mLs by nebulization every 4 hours as needed for shortness of breath / dyspnea or wheezing        losartan 25 MG tablet    COZAAR    90 tablet    Take 1.5 tablets (37.5 mg) by mouth daily    LVAD (left ventricular assist device) present (H)       metFORMIN 500 MG tablet    GLUCOPHAGE    60 tablet    Take 1 tablet (500 mg) by mouth 2 times daily (with meals)    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, without long-term current use of insulin, unspecified CKD stage (H)       metolazone 2.5 MG tablet    ZAROXOLYN    1 tablet    Take 1 tablet (2.5 mg) by mouth once for 1 dose    Chronic systolic congestive heart failure (H), LVAD (left ventricular assist device) present (H)       metoprolol succinate 25 MG 24 hr tablet    TOPROL-XL    180 tablet    Take 50mg in the morning and 25mg in the evening    Chronic systolic congestive heart failure (H), LVAD (left ventricular assist device) present (H)       pantoprazole 40 MG EC tablet    PROTONIX    60 tablet    Take 1 tablet (40 mg) by mouth every morning    Gastroesophageal reflux disease without esophagitis       potassium chloride 10 MEQ tablet    K-TAB,KLOR-CON    120 tablet    Take 2 tablets (20 mEq) by mouth daily    LVAD (left ventricular assist device) present  (H), Chronic systolic congestive heart failure (H)       predniSONE 20 MG tablet    DELTASONE    20 tablet    Take 0.5 tablets (10 mg) by mouth daily    Acute idiopathic gout, unspecified site, LVAD (left ventricular assist device) present (H), Nerve pain, Chronic systolic congestive heart failure (H)       PROAIR  (90 Base) MCG/ACT inhaler   Generic drug:  albuterol      Inhale 2 puffs into the lungs every 4 hours as needed for shortness of breath / dyspnea or wheezing        SINGULAIR 10 MG tablet   Generic drug:  montelukast      Take 10 mg by mouth At Bedtime        traMADol 50 MG tablet    ULTRAM    28 tablet    Take 2 tablets (100 mg) by mouth every 6 hours as needed for moderate pain or breakthrough pain    Acute post-operative pain       * warfarin 3 MG tablet    COUMADIN    30 tablet    Use as directed.    LVAD (left ventricular assist device) present (H)       * warfarin 5 MG tablet    COUMADIN    90 tablet    Take 5 - 7.5mg daily or as directed by coumadin clinic.    LVAD (left ventricular assist device) present (H), Chronic systolic congestive heart failure (H)       zinc sulfate 220 (50 Zn) MG capsule    ZINCATE     Take 220 mg by mouth 2 times daily        * Notice:  This list has 2 medication(s) that are the same as other medications prescribed for you. Read the directions carefully, and ask your doctor or other care provider to review them with you.

## 2018-11-21 LAB — STFR SERPL-SCNC: 9.2 MG/L (ref 2.2–5)

## 2018-11-23 ENCOUNTER — CARE COORDINATION (OUTPATIENT)
Dept: CARDIOLOGY | Facility: CLINIC | Age: 63
End: 2018-11-23

## 2018-11-23 DIAGNOSIS — Z95.811 LVAD (LEFT VENTRICULAR ASSIST DEVICE) PRESENT (H): Primary | ICD-10-CM

## 2018-11-23 NOTE — PROGRESS NOTES
I called Jim to see how he's feeling after the med changes on Tuesday, the ICD shock on Tuesday night and the following additional med changes. After the shock his bumex was reduced to 1mg BID and his KCl was reduced to 20mEq daily. He said he feels fine. I asked him to get a set of labs at his [place] lab on Tuesday, 11/27.

## 2018-11-27 ENCOUNTER — ANTICOAGULATION THERAPY VISIT (OUTPATIENT)
Dept: ANTICOAGULATION | Facility: CLINIC | Age: 63
End: 2018-11-27

## 2018-11-27 DIAGNOSIS — Z95.811 LVAD (LEFT VENTRICULAR ASSIST DEVICE) PRESENT (H): ICD-10-CM

## 2018-11-27 LAB
ALBUMIN SERPL-MCNC: 3.7 G/DL (ref 3.4–5)
ALP SERPL-CCNC: 86 U/L (ref 40–150)
ALT SERPL W P-5'-P-CCNC: 26 U/L (ref 0–70)
ANION GAP SERPL CALCULATED.3IONS-SCNC: 7 MMOL/L (ref 3–14)
AST SERPL W P-5'-P-CCNC: 24 U/L (ref 0–45)
BILIRUB SERPL-MCNC: 0.5 MG/DL (ref 0.2–1.3)
BUN SERPL-MCNC: 27 MG/DL (ref 7–30)
CALCIUM SERPL-MCNC: 8.4 MG/DL (ref 8.5–10.1)
CHLORIDE SERPL-SCNC: 105 MMOL/L (ref 94–109)
CO2 SERPL-SCNC: 27 MMOL/L (ref 20–32)
CREAT SERPL-MCNC: 1.22 MG/DL (ref 0.66–1.25)
ERYTHROCYTE [DISTWIDTH] IN BLOOD BY AUTOMATED COUNT: 14.2 % (ref 10–15)
GFR SERPL CREATININE-BSD FRML MDRD: 60 ML/MIN/1.7M2
GLUCOSE SERPL-MCNC: 120 MG/DL (ref 70–99)
HCT VFR BLD AUTO: 37 % (ref 40–53)
HGB BLD-MCNC: 11.3 G/DL (ref 13.3–17.7)
INR PPP: 1.84 (ref 0.86–1.14)
LDH SERPL L TO P-CCNC: 318 U/L (ref 85–227)
MCH RBC QN AUTO: 26.4 PG (ref 26.5–33)
MCHC RBC AUTO-ENTMCNC: 30.5 G/DL (ref 31.5–36.5)
MCV RBC AUTO: 86 FL (ref 78–100)
PLATELET # BLD AUTO: 209 10E9/L (ref 150–450)
POTASSIUM SERPL-SCNC: 4.3 MMOL/L (ref 3.4–5.3)
PROT SERPL-MCNC: 6.9 G/DL (ref 6.8–8.8)
RBC # BLD AUTO: 4.28 10E12/L (ref 4.4–5.9)
SODIUM SERPL-SCNC: 139 MMOL/L (ref 133–144)
WBC # BLD AUTO: 6.9 10E9/L (ref 4–11)

## 2018-11-27 PROCEDURE — 36415 COLL VENOUS BLD VENIPUNCTURE: CPT | Performed by: INTERNAL MEDICINE

## 2018-11-27 PROCEDURE — 85027 COMPLETE CBC AUTOMATED: CPT | Performed by: INTERNAL MEDICINE

## 2018-11-27 PROCEDURE — 83615 LACTATE (LD) (LDH) ENZYME: CPT | Performed by: INTERNAL MEDICINE

## 2018-11-27 PROCEDURE — 80053 COMPREHEN METABOLIC PANEL: CPT | Performed by: INTERNAL MEDICINE

## 2018-11-27 PROCEDURE — 85610 PROTHROMBIN TIME: CPT | Performed by: INTERNAL MEDICINE

## 2018-11-27 NOTE — PROGRESS NOTES
ANTICOAGULATION FOLLOW-UP CLINIC VISIT    Patient Name:  Jim Willingham  Date:  11/27/2018  Contact Type:  Telephone    SUBJECTIVE:     Patient Findings     Positives No Problem Findings    Comments Left a message for patient to continue 325mg of Aspirin daily due to subtherapeutic INR result.  Recommended 10mg of Coumadin today and Thursday, 7.5mg tomorrow.             OBJECTIVE    INR   Date Value Ref Range Status   11/27/2018 1.84 (H) 0.86 - 1.14 Final       ASSESSMENT / PLAN  INR assessment SUB    Recheck INR In: 3 DAYS    INR Location Clinic      Anticoagulation Summary as of 11/27/2018     INR goal 2.0-3.0   Today's INR 1.84!   Warfarin maintenance plan 10 mg (5 mg x 2) on Fri; 7.5 mg (5 mg x 1.5) all other days   Full warfarin instructions 11/27: 10 mg; 11/29: 10 mg; Otherwise 10 mg on Fri; 7.5 mg all other days   Weekly warfarin total 55 mg   Plan last modified Brendan Montoya RN (11/9/2018)   Next INR check 11/30/2018   Priority INR   Target end date     Indications   LVAD (left ventricular assist device) present (H) [Z95.811]  Long-term (current) use of anticoagulants [Z79.01] [Z79.01]         Anticoagulation Episode Summary     INR check location     Preferred lab     Send INR reminders to [facility]    Comments HIPPA Forms mailed 6/26/17  Labs drawn either at [facility] or [facility]  LVAD placed 6/19/17   II  ASA 81 mg daily      Anticoagulation Care Providers     Provider Role Specialty Phone number    Delisa Montgomery MD Responsible Cardiology [phone]            See the Encounter Report to view Anticoagulation Flowsheet and Dosing Calendar (Go to Encounters tab in chart review, and find the Anticoagulation Therapy Visit)    Left message for patient with results and dosing recommendations. Asked patient to call back to report any missed doses, falls, signs and symptoms of bleeding or clotting, any changes in health, medication, or diet. Asked patient to call back with  any questions or concerns.    Patient had LVAD placed on:   6/19/17  Patient's current Aspirin dose: 325mg daily until therapeutic INR documented  LVAD Protocol followed:  Yes   If Not Followed Explanation:  Brendan Garcia, RN

## 2018-11-27 NOTE — MR AVS SNAPSHOT
Jim Willingham   11/27/2018   Anticoagulation Therapy Visit    Description:  63 year old male   Provider:  Brendan Montoya, RN   Department:  [facility]           INR as of 11/27/2018     Today's INR 1.84!      Anticoagulation Summary as of 11/27/2018     INR goal 2.0-3.0   Today's INR 1.84!   Full warfarin instructions 11/27: 10 mg; 11/29: 10 mg; Otherwise 10 mg on Fri; 7.5 mg all other days   Next INR check 11/30/2018    Indications   LVAD (left ventricular assist device) present (H) [Z95.811]  Long-term (current) use of anticoagulants [Z79.01] [Z79.01]         November 2018 Details    Sun Mon Tue Wed Thu Fri Sat         1               2               3                 4               5               6               7               8               9               10                 11               12               13               14               15               16               17                 18               19               20               21               22               23               24                 25               26               27      10 mg   See details      28      7.5 mg         29      10 mg         30              Date Details   11/27 This INR check       Date of next INR:  11/30/2018         How to take your warfarin dose     To take:  7.5 mg Take 1.5 of the 5 mg tablets.    To take:  10 mg Take 2 of the 5 mg tablets.

## 2018-11-29 NOTE — DISCHARGE SUMMARY
[facility]   Acute Rehabilitation Unit  Discharge summary     Date of Admission: 6/30/2017  Date of Discharge: 7/7/2017  Disposition: home  Primary Care Physician: Jovany Salas  Attending physician: Nabor Franco MD    discharge diagnosis  NICM s/p lvad  ckd  Copd  Asthma  DM type 2  Acute blood loss anemia  ckd  Depression  insomnia    brief summary  Jim Willingham is a 62 year old man with a past medical history of CKD stage III, DM type 2, CECILIA, Obesity s/p gastric bypass, HTN, Asthma, COPD, NICM with EF 20% who was admitted 6/15 with progressive sob, ultimately admitted to heart failure team and underwent VAD (Heartmate II) placement 6/19.  Postoperatively admitted to CICU, extubated POD 2.  Post operative course relatively uncomplicated, non sustained arrhthymias,  WALTER, small left pleural effusion, decreased strength and activity tolerance. Admitted to ARU 6/30 for ongoing aggressive rehabilitation and medical management.      rehabilitaiton course  ADLs/IADLs: Pt has made good progress with ADLs. Pt is IND standing g/h, toilet transfer/hygiene, and TB dressing. Pt is new LVAD and has plan to set up sponge bathing at home, will benefit from initial supervision with sponge bathing. Pt is IND simple meal prep and household tasks. Pt performance limited by fatigue/SOB, tolerating short periods of standing activity. Pt has been educated on EC/WS techniques and is able to IND incorporate strategies into ADLs/IADLs. Plan for d/c home tomorrow with spouse support first 30 days following d/c. Recommend initial HH OT and then OP cardiac rehab once strength and endurance have improved.     Mobility:  Pt on track for safe d/c home 7/7/17 with prn assist, home PT. Pt has progressed to ind in room, mod I with 4WW on flat level surfaces up to 150'; mod I on stairs using single rail. Has been well tolerating nustep for strength, endurance and cardiovascular health. Pt will be  using 4WW from home. F/u with OP cardiac rehab once activity tolerance improved.    mEDICAL COURSE  NICM EF 20% s/p Heartmate II LVAD 6/19- asa 81 mg daily, warfarin with INR 2-3.  LVAD coordinator Rajani Abdi.  SVT 6/23-6/24, NSVT 6/25 MAP goal <90.  Patient and wife have passed LVAD training.   -continue asa, warfarin- plan for INR on or before 7/9 with results faxed to Dr. Salas PCP.   -continue daily weights  -daily driveline dressing changes  -continue metoprolol 25 bid  -continue to monitor and record LVAD settings  -continue 2 mg bumex bid (dose increased 7/6)  -continue pain management (incisional pain):scheduled tylenol po prn dilaudid with tramadol for breakthrough- discussed dilaudid taper patient agreeable to plan  -case discussed with Cate Carcamo NP 7/6.   -follow up as scheduled next visit 7/12 Cate Marshall NP      WALTER on CKD- CKD stage III with baseline CR 1.4  peak 2.12 during acute hospitalization, Cr. 1.32 7/6.   -avoid nephrotoxic agents  - plan for follow up BMP 7/12.       CECILIA- continue QHS BiPAP      Asthma and COPD-    continue of PTA BREO ELLIPTA, q 4 hour duo nebs, Singulair 10 mg QHS,       Depression/ insomnia- PTA Celexa, trazodone started during hospitalization for sleep  -continue Celexa  -continue trazodone (with planned taper at discharge)     DM type 2- PTA on metformin, dose reduced due to WALTER on CKD during admission  -continue metformin 250 mg bid-   -glucose monitoring twice daily  -follow up with pcp- consider d/c metformin      Acute post op anemia- hgb stable 8.9  -monitor CBC q Mon     dISCHARGE MEDICATIONS  Current Discharge Medication List      CONTINUE these medications which have CHANGED    Details   aspirin 81 MG chewable tablet 1 tablet (81 mg) by Oral or Feeding Tube route daily  Qty: 36 tablet    Associated Diagnoses: LVAD (left ventricular assist device) present (H)      metFORMIN (GLUCOPHAGE) 500 MG tablet Take 0.5 tablets (250 mg) by mouth 2 times daily  (with meals)  Qty: 60 tablet, Refills: 0    Associated Diagnoses: 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, without long-term current use of insulin, unspecified CKD stage (H)      pantoprazole (PROTONIX) 40 MG EC tablet Take 1 tablet (40 mg) by mouth every morning  Qty: 30 tablet, Refills: 0    Associated Diagnoses: Acute post-operative pain      potassium chloride SA (K-DUR/KLOR-CON M) 20 MEQ CR tablet Take 1 tablet (20 mEq) by mouth 2 times daily  Qty: 60 tablet, Refills: 0    Associated Diagnoses: LVAD (left ventricular assist device) present (H)      senna-docusate (SENOKOT-S;PERICOLACE) 8.6-50 MG per tablet Take 2 tablets by mouth 2 times daily as needed for constipation  Qty: 100 tablet, Refills: 0    Associated Diagnoses: Acute post-operative pain      traZODone (DESYREL) 50 MG tablet Take 50 mg by mouth nightly x one week then reduce to 25 mg (1/2 tab) by mouth nightly x one week then stop.  Qty: 11 tablet, Refills: 0    Associated Diagnoses: Acute post-operative pain; LVAD (left ventricular assist device) present (H)      warfarin (COUMADIN) 3 MG tablet Use as directed.  Qty: 30 tablet, Refills: 0    Associated Diagnoses: LVAD (left ventricular assist device) present (H)      HYDROmorphone (DILAUDID) 2 MG tablet Take 1 tablet (2 mg) by mouth every 4 hours as needed for moderate to severe pain  Qty: 45 tablet, Refills: 0    Associated Diagnoses: Acute post-operative pain         CONTINUE these medications which have NOT CHANGED    Details   traMADol (ULTRAM) 50 MG tablet Take 2 tablets (100 mg) by mouth every 6 hours as needed for moderate pain or breakthrough pain  Qty: 28 tablet    Associated Diagnoses: Acute post-operative pain      acetaminophen (TYLENOL) 325 MG tablet Take 2 tablets (650 mg) by mouth every 6 hours  Qty: 100 tablet    Associated Diagnoses: Acute post-operative pain      metoprolol (LOPRESSOR) 25 MG tablet Take 1 tablet (25 mg) by mouth 2 times  Cholangitis daily  Qty: 60 tablet    Associated Diagnoses: LVAD (left ventricular assist device) present (H); Paroxysmal atrial fibrillation (H)      ipratropium - albuterol 0.5 mg/2.5 mg/3 mL (DUONEB) 0.5-2.5 (3) MG/3ML neb solution Take 3 mLs by nebulization every 4 hours as needed for shortness of breath / dyspnea or wheezing      citalopram (CELEXA) 20 MG tablet Take 20 mg by mouth daily      fluticasone-vilanterol (BREO ELLIPTA) 200-25 MCG/INH oral inhaler Inhale 1 puff into the lungs daily      montelukast (SINGULAIR) 10 MG tablet Take 10 mg by mouth At Bedtime      bumetanide (BUMEX) 2 MG tablet Take 1 tablet (2 mg) by mouth daily    Associated Diagnoses: LVAD (left ventricular assist device) present (H)      polyethylene glycol (MIRALAX/GLYCOLAX) Packet Take 17 g by mouth daily as needed for constipation (for no bm in 1 day)  Qty: 7 packet    Associated Diagnoses: Acute post-operative pain      albuterol (ALBUTEROL) 108 (90 BASE) MCG/ACT Inhaler Inhale 2 puffs into the lungs every 4 hours as needed for shortness of breath / dyspnea or wheezing      cyanocobalamin (VITAMIN B12) 1000 MCG/ML injection Inject 1,000 mcg into the muscle every 30 days      umeclidinium (INCRUSE ELLIPTA) 62.5 MCG/INH oral inhaler Inhale 1 puff into the lungs daily      zinc sulfate (ZINCATE) 220 (50 ZN) MG capsule Take 220 mg by mouth 2 times daily             DISCHARGE INSTRUCTIONS AND FOLLOW UP    Discharge Procedure Orders  Home care nursing referral   Referral Type: Home Health Therapies & Aides     Home Care PT Referral for Hospital Discharge   Referral Type: Home Health Therapies & Aides     Home Care OT Referral for Hospital Discharge     Reason for your hospital stay   Order Comments: Rehab after LVAD placement to regain function.     MD face to face encounter   Order Comments: Documentation of Face to Face and Certification for Home Health Services    I certify that patient: Jim Willingham is under my care and that I, or a nurse  practitioner or physician's assistant working with me, had a face-to-face encounter that meets the physician face-to-face encounter requirements with this patient on: 7/6/2017.    This encounter with the patient was in whole, or in part, for the following medical condition, which is the primary reason for home health care: LVAD placement and significant reduction in mobility.    I certify that, based on my findings, the following services are medically necessary home health services: Nursing, Occupational Therapy and Physical Therapy.    My clinical findings support the need for the above services because: Nurse is needed: For complex aftercare of surgical procedures (LVAD placement) because the patient needs follow up and instruction as needed and RN will also obtain INR and contact PCP with results for dosing of Warfarin.    Further, I certify that my clinical findings support that this patient is homebound (i.e. absences from home require considerable and taxing effort and are for medical reasons or Mosque services or infrequently or of short duration when for other reasons) because: Requires assistance of another person or specialized equipment to access medical services because patient: Is unable to walk greater than about 100 feet without rest...    Based on the above findings. I certify that this patient is confined to the home and needs intermittent skilled nursing care, physical therapy and/or speech therapy.  The patient is under my care, and I have initiated the establishment of the plan of care.  This patient will be followed by a physician who will periodically review the plan of care.  Physician/Provider to provide follow up care: No Ref-Primary, Physician    Attending hospital physician (the Medicare certified [facility] provider): Nabor Franco  Physician Signature: See electronic signature associated with these discharge orders.  Date: 7/6/2017     Follow Up and recommended labs and tests   Order  Comments: Follow up with primary care provider, Physician No Ref-Primary, within 1-2 days to follow up on results.  The following labs/tests are recommended: INR to be drawn by home RN. [facility] Dr. Yuan.     Follow Up and recommended labs and tests   Order Comments: INR on or before 7/9 to be drawn per home RN results faxed to Dr. Delbert Salas at [facility] ([phone]) phone [phone]  Remainder of labs per LVAD team, prior to appointments     Activity   Order Comments: Continue sternal precautions, no heavy lifting, no showering until directed by LVAD team, up with walker.   Order Specific Question Answer Comments   Is discharge order? Yes      Monitor and record   Order Comments: weight every day- if weight change >2 lbs in one day or >5 lbs in one week contact LVAD coordinator     Full Code     Full Code     Diet   Order Comments: Follow this diet upon discharge: Orders Placed This Encounter     Snacks/Supplements Adult: Ensure Clear; With Meals     Snacks/Supplements Adult: Other - Please comment; Pro-stat sugar free (citrus splash) + 4oz diet lemonade; Between Meals     Combination Diet Regular Diet Adult; Thin Liquids (water, ice chips, juice, milk, gelatin, ice cream, etc)   Order Specific Question Answer Comments   Is discharge order? Yes           physical examination    Most recent Vital Signs:   Vitals:    07/06/17 0603 07/06/17 0743 07/06/17 0809 07/06/17 1156   BP:   (!) 85/71    BP Location:   Right arm    Pulse:   88    Resp:   18    Temp:   97.9  F (36.6  C)    TempSrc:   Oral    SpO2:  96% 96% 96%   Weight: 111.9 kg (246 lb 12.8 oz)      Height:       General: alert nad  CV: lvad hum  Resp: non labored clear   Ab: soft non distended non tender  Extremities: warm dry 1-2+ ble edema with mirta hose in place  Skin: incision cdi dried drainage    45 minutes spent in discharge, including >50% in counseling and coordination of care, medication review and plan of care  recommended on follow up.     Patient was evaluated on day of discharge by attending physician, Nabor Franco MD, who agrees with plan of care.    Discharge summary was forwarded to Jovany Salas (PCP) at the time of discharge, so as to bridge from hospital to outpatient care.     It was our pleasure to care for Jim Willingham during this hospitalization. Please do not hesitate to contact me should there be questions regarding the hospital course or discharge plan.          Madina Laguna PA-C   Rehab Medicine Service  [phone]

## 2018-11-30 ENCOUNTER — ANTICOAGULATION THERAPY VISIT (OUTPATIENT)
Dept: ANTICOAGULATION | Facility: CLINIC | Age: 63
End: 2018-11-30

## 2018-11-30 DIAGNOSIS — Z95.811 LVAD (LEFT VENTRICULAR ASSIST DEVICE) PRESENT (H): ICD-10-CM

## 2018-11-30 NOTE — MR AVS SNAPSHOT
Jim Willingham   11/30/2018   Anticoagulation Therapy Visit    Description:  63 year old male   Provider:  Brendan Montoya, RN   Department:  [facility]           INR as of 11/30/2018     Today's INR No new INR was available at the time of this encounter.      Anticoagulation Summary as of 11/30/2018     INR goal 2.0-3.0   Today's INR No new INR was available at the time of this encounter.   Full warfarin instructions 10 mg on Fri; 7.5 mg all other days   Next INR check 12/3/2018    Indications   LVAD (left ventricular assist device) present (H) [Z95.811]  Long-term (current) use of anticoagulants [Z79.01] [Z79.01]         November 2018 Details    Sun Mon Tue Wed Thu Fri Sat         1               2               3                 4               5               6               7               8               9               10                 11               12               13               14               15               16               17                 18               19               20               21               22               23               24                 25               26               27               28               29               30      10 mg   See details        Date Details   11/30 This INR check               How to take your warfarin dose     To take:  10 mg Take 2 of the 5 mg tablets.           December 2018 Details    Sun Mon Tue Wed Thu Fri Sat           1      7.5 mg           2      7.5 mg         3            4               5               6               7               8                 9               10               11               12               13               14               15                 16               17               18               19               20               21               22                 23               24               25               26               27               28               29                 30                31                     Date Details   No additional details    Date of next INR:  12/3/2018         How to take your warfarin dose     To take:  7.5 mg Take 1.5 of the 5 mg tablets.

## 2018-11-30 NOTE — PROGRESS NOTES
11/30/18 Jim did not get to the lab.  Instructed patient to continue 325mg of Aspirin through the weekend.  Will check an INR on Monday.    Brendan Montoya RN

## 2018-12-03 ENCOUNTER — ANTICOAGULATION THERAPY VISIT (OUTPATIENT)
Dept: ANTICOAGULATION | Facility: CLINIC | Age: 63
End: 2018-12-03

## 2018-12-03 ENCOUNTER — CARE COORDINATION (OUTPATIENT)
Dept: CARDIOLOGY | Facility: CLINIC | Age: 63
End: 2018-12-03

## 2018-12-03 DIAGNOSIS — Z95.811 LVAD (LEFT VENTRICULAR ASSIST DEVICE) PRESENT (H): ICD-10-CM

## 2018-12-03 DIAGNOSIS — Z79.01 LONG TERM CURRENT USE OF ANTICOAGULANT THERAPY: ICD-10-CM

## 2018-12-03 LAB — INR PPP: 2.33 (ref 0.86–1.14)

## 2018-12-03 PROCEDURE — 85610 PROTHROMBIN TIME: CPT | Performed by: INTERNAL MEDICINE

## 2018-12-03 PROCEDURE — 36415 COLL VENOUS BLD VENIPUNCTURE: CPT | Performed by: INTERNAL MEDICINE

## 2018-12-03 NOTE — PROGRESS NOTES
Patient had LVAD placed on:   6/19/2017  Patient's current Aspirin dose:resume taking 81mg daily.  LVAD Protocol followed:  Yes    ANTICOAGULATION FOLLOW-UP CLINIC VISIT    Patient Name:  Jim Willingham  Date:  12/3/2018  Contact Type:  Telephone    SUBJECTIVE:     Patient Findings     Positives No Problem Findings           OBJECTIVE    INR   Date Value Ref Range Status   12/03/2018 2.33 (H) 0.86 - 1.14 Final       ASSESSMENT / PLAN  INR assessment THER    Recheck INR In: 4 DAYS    INR Location Clinic      Anticoagulation Summary as of 12/3/2018     INR goal 2.0-3.0   Today's INR 2.33   Warfarin maintenance plan 10 mg (5 mg x 2) on Fri; 7.5 mg (5 mg x 1.5) all other days   Full warfarin instructions 10 mg on Fri; 7.5 mg all other days   Weekly warfarin total 55 mg   Plan last modified Brendan Montoya RN (11/9/2018)   Next INR check 12/7/2018   Priority INR   Target end date     Indications   LVAD (left ventricular assist device) present (H) [Z95.811]  Long-term (current) use of anticoagulants [Z79.01] [Z79.01]         Anticoagulation Episode Summary     INR check location     Preferred lab     Send INR reminders to [facility]    Comments HIPPA Forms mailed 6/26/17  Labs drawn either at [facility] or [facility]  LVAD placed 6/19/17   II  ASA 81 mg daily      Anticoagulation Care Providers     Provider Role Specialty Phone number    UlisesDelisa travis MD Responsible Cardiology [phone]            See the Encounter Report to view Anticoagulation Flowsheet and Dosing Calendar (Go to Encounters tab in chart review, and find the Anticoagulation Therapy Visit)    Spoke with patient.    Delisa Hillman RN

## 2018-12-03 NOTE — PROGRESS NOTES
Called pt to review labs drawn on 11/27/18. Labs are stable and there are no concerns. Pt reports a weight gain of about 5lb since decrease in bumex. Pt reports shortness of breath with activity (going up stairs) and towards the end of the day when he is more fatigued. Also endorses some slight abdominal distention. At last clinic visit, pt appeared slightly dehydrated and after a shock for VT, diuretics were decreased to bumex 1mg BID. Advised pt that he could add 1mg of bumex x1 to see if weight or shortness of breath improve. If pt takes additional bumex, he should also take an additional 10mEq of KCL. Instructed pt to call back to report effects or report any new symptoms. Pt verbalized understanding and will be seen in clinic on 12/7/18.

## 2018-12-03 NOTE — MR AVS SNAPSHOT
Jim Willingham   12/3/2018   Anticoagulation Therapy Visit    Description:  63 year old male   Provider:  Delisa Hillman, RN   Department:  [facility]           INR as of 12/3/2018     Today's INR 2.33      Anticoagulation Summary as of 12/3/2018     INR goal 2.0-3.0   Today's INR 2.33   Full warfarin instructions 10 mg on Fri; 7.5 mg all other days   Next INR check 12/7/2018    Indications   LVAD (left ventricular assist device) present (H) [Z95.811]  Long-term (current) use of anticoagulants [Z79.01] [Z79.01]         December 2018 Details    Sun Mon Tue Wed Thu Fri Sat           1                 2               3      7.5 mg   See details      4      7.5 mg         5      7.5 mg         6      7.5 mg         7            8                 9               10               11               12               13               14               15                 16               17               18               19               20               21               22                 23               24               25               26               27               28               29                 30               31                     Date Details   12/03 This INR check       Date of next INR:  12/7/2018         How to take your warfarin dose     To take:  7.5 mg Take 1.5 of the 5 mg tablets.    To take:  10 mg Take 2 of the 5 mg tablets.

## 2018-12-06 DIAGNOSIS — I50.22 CHRONIC SYSTOLIC CONGESTIVE HEART FAILURE (H): ICD-10-CM

## 2018-12-06 DIAGNOSIS — Z95.811 LVAD (LEFT VENTRICULAR ASSIST DEVICE) PRESENT (H): Primary | ICD-10-CM

## 2018-12-07 ENCOUNTER — OFFICE VISIT (OUTPATIENT)
Dept: CARDIOLOGY | Facility: CLINIC | Age: 63
End: 2018-12-07
Attending: INTERNAL MEDICINE
Payer: COMMERCIAL

## 2018-12-07 ENCOUNTER — ANTICOAGULATION THERAPY VISIT (OUTPATIENT)
Dept: ANTICOAGULATION | Facility: CLINIC | Age: 63
End: 2018-12-07

## 2018-12-07 VITALS
SYSTOLIC BLOOD PRESSURE: 74 MMHG | TEMPERATURE: 98.1 F | HEART RATE: 76 BPM | OXYGEN SATURATION: 98 % | WEIGHT: 276 LBS | BODY MASS INDEX: 41.83 KG/M2 | HEIGHT: 68 IN

## 2018-12-07 DIAGNOSIS — I42.8 NICM (NONISCHEMIC CARDIOMYOPATHY) (H): ICD-10-CM

## 2018-12-07 DIAGNOSIS — Z95.811 LVAD (LEFT VENTRICULAR ASSIST DEVICE) PRESENT (H): Primary | ICD-10-CM

## 2018-12-07 DIAGNOSIS — Z95.811 LVAD (LEFT VENTRICULAR ASSIST DEVICE) PRESENT (H): ICD-10-CM

## 2018-12-07 DIAGNOSIS — I50.22 CHRONIC SYSTOLIC CONGESTIVE HEART FAILURE (H): ICD-10-CM

## 2018-12-07 LAB
ALBUMIN SERPL-MCNC: 3.7 G/DL (ref 3.4–5)
ALP SERPL-CCNC: 99 U/L (ref 40–150)
ALT SERPL W P-5'-P-CCNC: 35 U/L (ref 0–70)
ANION GAP SERPL CALCULATED.3IONS-SCNC: 9 MMOL/L (ref 3–14)
AST SERPL W P-5'-P-CCNC: 24 U/L (ref 0–45)
BILIRUB SERPL-MCNC: 0.5 MG/DL (ref 0.2–1.3)
BUN SERPL-MCNC: 34 MG/DL (ref 7–30)
CALCIUM SERPL-MCNC: 8.2 MG/DL (ref 8.5–10.1)
CHLORIDE SERPL-SCNC: 103 MMOL/L (ref 94–109)
CO2 SERPL-SCNC: 27 MMOL/L (ref 20–32)
CREAT SERPL-MCNC: 1.28 MG/DL (ref 0.66–1.25)
ERYTHROCYTE [DISTWIDTH] IN BLOOD BY AUTOMATED COUNT: 14.9 % (ref 10–15)
GFR SERPL CREATININE-BSD FRML MDRD: 57 ML/MIN/1.7M2
GLUCOSE SERPL-MCNC: 123 MG/DL (ref 70–99)
HCT VFR BLD AUTO: 39 % (ref 40–53)
HGB BLD-MCNC: 11.7 G/DL (ref 13.3–17.7)
INR PPP: 2.54 (ref 0.86–1.14)
LDH SERPL L TO P-CCNC: 317 U/L (ref 85–227)
MCH RBC QN AUTO: 26.4 PG (ref 26.5–33)
MCHC RBC AUTO-ENTMCNC: 30 G/DL (ref 31.5–36.5)
MCV RBC AUTO: 88 FL (ref 78–100)
PLATELET # BLD AUTO: 222 10E9/L (ref 150–450)
POTASSIUM SERPL-SCNC: 4.3 MMOL/L (ref 3.4–5.3)
PROT SERPL-MCNC: 7.3 G/DL (ref 6.8–8.8)
RBC # BLD AUTO: 4.43 10E12/L (ref 4.4–5.9)
SODIUM SERPL-SCNC: 138 MMOL/L (ref 133–144)
WBC # BLD AUTO: 8.3 10E9/L (ref 4–11)

## 2018-12-07 PROCEDURE — 85610 PROTHROMBIN TIME: CPT | Performed by: INTERNAL MEDICINE

## 2018-12-07 PROCEDURE — 36415 COLL VENOUS BLD VENIPUNCTURE: CPT | Performed by: INTERNAL MEDICINE

## 2018-12-07 PROCEDURE — 99214 OFFICE O/P EST MOD 30 MIN: CPT | Mod: 25 | Performed by: INTERNAL MEDICINE

## 2018-12-07 PROCEDURE — 93750 INTERROGATION VAD IN PERSON: CPT | Mod: ZF | Performed by: INTERNAL MEDICINE

## 2018-12-07 PROCEDURE — 83615 LACTATE (LD) (LDH) ENZYME: CPT | Performed by: INTERNAL MEDICINE

## 2018-12-07 PROCEDURE — G0463 HOSPITAL OUTPT CLINIC VISIT: HCPCS | Mod: 25,ZF

## 2018-12-07 PROCEDURE — 85027 COMPLETE CBC AUTOMATED: CPT | Performed by: INTERNAL MEDICINE

## 2018-12-07 PROCEDURE — 80053 COMPREHEN METABOLIC PANEL: CPT | Performed by: INTERNAL MEDICINE

## 2018-12-07 ASSESSMENT — PAIN SCALES - GENERAL: PAINLEVEL: MILD PAIN (2)

## 2018-12-07 NOTE — PROGRESS NOTES
Preliminary Device Interrogation Results.  Final physician signed paceart report to be scanned and attached.    Patient seen in clinic for evaluation and iterative programming of Medtronic Viva XT CRT-D multi lead ICD per MD orders.  Patient has an appointment to see Dr. Montgomery today.  Normal ICD function. There was a know history of 1 ATP and then Shock fot VT @ 220's bpm on Nov 20th at 4 pm.  Later that day, unbeknownst to the patient at 8pm had another VT episode @ same rate and morphology that successfully treated with 1 sequence of ATP. Since Nov 20th patient has had 6 NSVT and 2 SVT  episodes recorded. The NSVT lasting <1-6 seconds @ rates of 170-200 bpm.  The SVT 1:1 AV lasting 11-20 seconds @ 150-160 bpm.  Intrinsic rhythm = SR @ 75 bpm.  AP = 1.1%.   = 90.7%. LV pacing 96.6 Ventricular sense response pacing = 4.6%.  OptiVol fluid index is slightly elevated and increasing from baseline which could be an indication of fluid retention..  Estimated battery longevity to MARVA = 2.9 years.  No short v-v intervals recorded.  RV lead impedance trends appear stable.  Patient reports that he is feeling well and has a major life change with more family and grandkids lifiving with him so more stress..  Plan for patient to send a remote transmission in 3 months or be seen prior to next clinic visit.    multi lead ICD

## 2018-12-07 NOTE — MR AVS SNAPSHOT
Jim Willingham   12/7/2018   Anticoagulation Therapy Visit    Description:  63 year old male   Provider:  Delisa Hillman, RN   Department:  [facility]           INR as of 12/7/2018     Today's INR 2.54      Anticoagulation Summary as of 12/7/2018     INR goal 2.0-3.0   Today's INR 2.54   Full warfarin instructions 10 mg on Fri; 7.5 mg all other days   Next INR check 12/14/2018    Indications   LVAD (left ventricular assist device) present (H) [Z95.811]  Long-term (current) use of anticoagulants [Z79.01] [Z79.01]         December 2018 Details    Sun Mon Tue Wed Thu Fri Sat           1                 2               3               4               5               6               7      10 mg   See details      8      7.5 mg           9      7.5 mg         10      7.5 mg         11      7.5 mg         12      7.5 mg         13      7.5 mg         14            15                 16               17               18               19               20               21               22                 23               24               25               26               27               28               29                 30               31                     Date Details   12/07 This INR check       Date of next INR:  12/14/2018         How to take your warfarin dose     To take:  7.5 mg Take 1.5 of the 5 mg tablets.    To take:  10 mg Take 2 of the 5 mg tablets.

## 2018-12-07 NOTE — NURSING NOTE
1). PUMP DATA  Primary controller serial number: [id number]     II:   Flow: 5.1 L/min,    Speed: 9400 RPMs,     PI: 5.9 ,  Power: 5.8 Manuel,      Primary controller   Back up battery: Patient use: 58, Replace in: 12  Months     Data downloaded: No   Equipment and driveline assessed for damage: Yes     Back up : Serial number: [id number]  Back up battery: Patient use: 4 Replace in: 14  Months  Programmed settings identical to the settings on the primary controller :Yes      Education complete: Yes   Charge the BACKUP controller s backup battery every 6 months  Perform a self test on BACKUP every 6 months  Change the MPU s batteries every 6 months:Yes  Have equipment serviced yearly (if applicable):Yes. Brought battery charger in this summer, per pt.       2). ALARMS  Alarms reported by patient since last pump evaluation: Yes, external power loss repeated r/t plug loose in outlet and on the back of the MPU. Problem solved. Pt aware that if MPU plug is loose, we can replace it with a new MPU that locks.   Alarms or other finding noted during pump data history and alarm download: No    Action Taken:  Reviewed data with patient: Yes      3). DRESSING CHANGE / DRIVELINE ASSESSMENT  Dressing change completed today: Yes  Appearance of Driveline site: c/d/i. Doing weekly. No issues.     Driveline stabilization: Method: [employer]  [ Teaching reinforced on need for stabilization of Driveline. ]    4).Pt. Education    D:  Pt education provided.  I:  Pt educated on the following topics:  1. When to call 911.  Reviewed emergency situations.  2. What to do if my VAD Coordinator is not returning my call.  3. When to page the VAD Coordinator on Call (handout provided w/ frequent symptoms to report).  4. Pt practice paged the VAD Coordinator on Call.   5. Pt given page of stickers with the number for the VAD Coordinator on Call.  6. Pt given list of frequently used resources and their numbers.  Reviewed when to call  each resource.  (Social Work, Financial Counselor, [facility], Device Nurse, ).  A:  Pt verbalized understanding.  P:  VAD Coordinator available for questions or concerns.  Will continue VAD education.

## 2018-12-07 NOTE — PROGRESS NOTES
ANTICOAGULATION FOLLOW-UP CLINIC VISIT    Patient Name:  Jim Willingham  Date:  12/7/2018  Contact Type:  Telephone    SUBJECTIVE:     Patient Findings     Positives Change in medications (Gabapentin dose increased)           OBJECTIVE    INR   Date Value Ref Range Status   12/07/2018 2.54 (H) 0.86 - 1.14 Final       ASSESSMENT / PLAN  INR assessment THER    Recheck INR In: 1 WEEK    INR Location Clinic      Anticoagulation Summary as of 12/7/2018     INR goal 2.0-3.0   Today's INR 2.54   Warfarin maintenance plan 10 mg (5 mg x 2) on Fri; 7.5 mg (5 mg x 1.5) all other days   Full warfarin instructions 10 mg on Fri; 7.5 mg all other days   Weekly warfarin total 55 mg   Plan last modified Brendan Montoya RN (11/9/2018)   Next INR check 12/14/2018   Priority INR   Target end date     Indications   LVAD (left ventricular assist device) present (H) [Z95.811]  Long-term (current) use of anticoagulants [Z79.01] [Z79.01]         Anticoagulation Episode Summary     INR check location     Preferred lab     Send INR reminders to [facility]    Comments HIPPA Forms mailed 6/26/17  Labs drawn either at [facility] or [facility]  LVAD placed 6/19/17   II  ASA 81 mg daily      Anticoagulation Care Providers     Provider Role Specialty Phone number    AckermanDelisa MD Responsible Cardiology [phone]            See the Encounter Report to view Anticoagulation Flowsheet and Dosing Calendar (Go to Encounters tab in chart review, and find the Anticoagulation Therapy Visit)  Patient had LVAD placed on:   6/19/2017  Patient's current Aspirin dose: 81mg  LVAD Protocol followed:  Yes    Spoke with patient.    Delisa Hillman RN

## 2018-12-07 NOTE — LETTER
12/7/2018      RE: Jim Willingham  [address] [facility] [place] [address]       Dear Colleague,    Thank you for the opportunity to participate in the care of your patient, Jim Willingham, at the [facility]. Please see a copy of my visit note below.      December 7, 2018      HPI:   Jim Willingham is a 63 year old male with a past medical history of NICM (EF 20%) s/p HM II LVAD placement (6/19/17) complicated by arrhythmias, WALTER, and small left pleural effusion, CKD Stage III, DM Type II, CECILIA, obesity, HTN, COPD, and asthma who is here for heart failure and LVAD follow up.     Today he reports feeling well overall.  His weight increased by around 10 pounds last week and he increased his Bumex to 2 mg in the morning and 1 mg at night-his weight is down close to his baseline weight again.  At her last visit he was iron deficient and we arrange for iron infusions.  His MCV has come up as is his hemoglobin from around 8-11 today.  He overall reports better energy with this.  Of note he was shocked about a month ago.  He did not lose consciousness.  He has had 2 more rounds of ATP since that time.  For those other episodes were he was asymptomatic.    From a pulmonary perspective he has had very little wheezing recently and has not had to have a steroid pulse or an antibiotic course recently.  He did get a flu shot.     He denies any chest pain, edema,  orthopnea, PND. . He denies fevers, chills, driveline redness, tenderness, or discharge. No LVAD alarms and denies any stroke symptoms or blood in the urine or stools.    He is still working on his diet which is the main barrier to cardiac transplant right now.       PAST MEDICAL HISTORY:  Past Medical History:   Diagnosis Date     Benign essential hypertension 5/11/2017     Cardiomyopathy, unspecified (H) 5/8/2017     CKD (chronic kidney disease) stage 3, GFR 30-59 ml/min (H) 5/11/2017     Depression 5/11/2017      Diabetes mellitus (H) 5/11/2017     H/O gastric bypass 5/11/2017     ICD (implantable cardioverter-defibrillator), biventricular, in situ 5/11/2017     LVAD (left ventricular assist device) present (H)      NICM (nonischemic cardiomyopathy) (H)/ EF 20% 5/11/2017    ECHO: LVEDd. 7.66 cm, Restrictive pattern , Severe mitral valve regurgitation     CECILIA (obstructive sleep apnea) 5/11/2017     Paroxysmal atrial fibrillation (H) 5/11/2017     Paroxysmal VT (H) 5/11/2017     Uncomplicated asthma      Vitamin B12 deficiency (non anemic) 5/11/2017       FAMILY HISTORY:  Family History   Problem Relation Age of Onset     Cerebrovascular Disease Mother      Diabetes Mother      Hypertension Mother      Coronary Artery Disease Father      Type 2 Diabetes Father      SOCIAL HISTORY:  Social History     Social History     Marital status:      Spouse name: N/A     Number of children: N/A     Years of education: N/A     Social History Main Topics     Smoking status: Former Smoker     Quit date: 1995     Smokeless tobacco: Not on file     Alcohol use No     Drug use: Not on file     Sexual activity: Yes     Partners: Female     Other Topics Concern     Parent/Sibling W/ Cabg, Mi Or Angioplasty Before 65f 55m? No     Social History Narrative       CURRENT MEDICATIONS:  Prescription Medications as of 12/7/2018             acetaminophen (TYLENOL) 325 MG tablet Take 2 tablets (650 mg) by mouth every 6 hours    albuterol (ALBUTEROL) 108 (90 BASE) MCG/ACT Inhaler Inhale 2 puffs into the lungs every 4 hours as needed for shortness of breath / dyspnea or wheezing    allopurinol (ZYLOPRIM) 100 MG tablet Take 1 tablet (100 mg) by mouth daily    aspirin 81 MG chewable tablet 1 tablet (81 mg) by Oral or Feeding Tube route daily    bumetanide (BUMEX) 1 MG tablet Take 2 tablets (2 mg) by mouth 2 times daily    citalopram (CELEXA) 20 MG tablet Take 20 mg by mouth daily    cyanocobalamin (VITAMIN B12) 1000 MCG/ML injection Inject 1,000  "mcg into the muscle every 30 days    fluticasone-vilanterol (BREO ELLIPTA) 200-25 MCG/INH oral inhaler Inhale 1 puff into the lungs daily    gabapentin (NEURONTIN) 300 MG capsule Take 1 capsule (300 mg) by mouth 3 times daily    ipratropium - albuterol 0.5 mg/2.5 mg/3 mL (DUONEB) 0.5-2.5 (3) MG/3ML neb solution Take 3 mLs by nebulization every 4 hours as needed for shortness of breath / dyspnea or wheezing    losartan (COZAAR) 25 MG tablet Take 1.5 tablets (37.5 mg) by mouth daily    metFORMIN (GLUCOPHAGE) 500 MG tablet Take 1 tablet (500 mg) by mouth 2 times daily (with meals)    metoprolol succinate (TOPROL-XL) 25 MG 24 hr tablet Take 50mg in the morning and 25mg in the evening    montelukast (SINGULAIR) 10 MG tablet Take 10 mg by mouth At Bedtime    pantoprazole (PROTONIX) 40 MG EC tablet Take 1 tablet (40 mg) by mouth every morning    potassium chloride (K-TAB,KLOR-CON) 10 MEQ tablet Take 2 tablets (20 mEq) by mouth daily    predniSONE (DELTASONE) 20 MG tablet Take 0.5 tablets (10 mg) by mouth daily    spironolactone (ALDACTONE) 25 MG tablet Take 1 tablet (25 mg) by mouth daily    traMADol (ULTRAM) 50 MG tablet Take 2 tablets (100 mg) by mouth every 6 hours as needed for moderate pain or breakthrough pain    umeclidinium (INCRUSE ELLIPTA) 62.5 MCG/INH oral inhaler Inhale 1 puff into the lungs daily    warfarin (COUMADIN) 3 MG tablet Use as directed.    warfarin (COUMADIN) 5 MG tablet Take 5 - 7.5mg daily or as directed by coumadin clinic.    zinc sulfate (ZINCATE) 220 (50 ZN) MG capsule Take 220 mg by mouth 2 times daily    metolazone (ZAROXOLYN) 2.5 MG tablet Take 1 tablet (2.5 mg) by mouth once for 1 dose          EXAM:  BP (!) 74/0 (BP Location: Right arm, Patient Position: Chair, Cuff Size: Adult Large)  Pulse 76  Temp 98.1  F (36.7  C) (Oral)  Ht 1.727 m (5' 8\")  Wt 125.2 kg (276 lb)  SpO2 98%  BMI 41.97 kg/m2  General: appears comfortable, alert and articulate  Head: normocephalic, atraumatic  Eyes: " anicteric sclera, EOMI  Neck: no adenopathy, neck supple  Orophyarynx: moist mucosa, no lesions, dentition intact  Heart: LVAD hum present, no murmur, gallop, rub.  JVP 11 cm  Lungs: clear, no rales or wheezing  Abdomen: soft, non-tender, bowel sounds present, no hepatomegaly.    Extremities: no clubbing, cyanosis, trace edema.    Neurological: normal speech and affect, no gross motor deficits      Labs:    Lab Results   Component Value Date    WBC 8.3 12/07/2018    HGB 11.7 (L) 12/07/2018    HCT 39.0 (L) 12/07/2018     12/07/2018     12/07/2018    POTASSIUM 4.3 12/07/2018    CHLORIDE 103 12/07/2018    CO2 27 12/07/2018    BUN 34 (H) 12/07/2018    CR 1.28 (H) 12/07/2018     (H) 12/07/2018    SED 18 04/13/2018    NTBNPI 4216 (H) 06/15/2017    NTBNP 1351 (H) 07/06/2018    AST 24 12/07/2018    ALT 35 12/07/2018    ALKPHOS 99 12/07/2018    BILITOTAL 0.5 12/07/2018    INR 2.54 (H) 12/07/2018       ICD interrogation:   Patient seen in clinic for evaluation and iterative programming of Med[employer]onic Viva XT CRT-D multi lead ICD per MD orders.  Patient has an appointment to see Dr. Montgomery today.  Normal ICD function. There was a know history of 1 ATP and then Shock fot VT @ 220's bpm on Nov 20th at 4 pm.  Later that day, unbeknownst to the patient at 8pm had another VT episode @ same rate and morphology that successfully treated with 1 sequence of ATP. Since Nov 20th patient has had 6 NSVT and 2 SVT  episodes recorded. The NSVT lasting <1-6 seconds @ rates of 170-200 bpm.  The SVT 1:1 AV lasting 11-20 seconds @ 150-160 bpm.  Intrinsic rhythm = SR @ 75 bpm.  AP = 1.1%.   = 90.7%. LV pacing 96.6 Ventricular sense response pacing = 4.6%.  OptiVol fluid index is slightly elevated and increasing from baseline which could be an indication of fluid retention..  Estimated battery longevity to MARVA = 2.9 years.  No short v-v intervals recorded.  RV lead impedance trends appear stable.  Patient reports that he is  feeling well and has a major life change with more family and grandkids lifiving with him so more stress..  Plan for patient to send a remote transmission in 3 months or be seen prior to next clinic visit.    Most recent echocardiogram:  Interpretation Summary  Technically difficult study.  HMII 9600 RPM  Severely (EF 10-20%) reduced left ventricular function is present. LVAD  cannula in the apex is not well seen. Velocities of the inflow and outflow not  interpretable.  Moderate right ventricular dilatation present. Global right ventricular  function is moderately reduced.  There is mitral and tricuspid regurgitation that is difficult to quantify on  this exam.  The inferior vena cava was normal in size with preserved respiratory  variability.  No pericardial effusion is present.       Left Ventricle  Severely (EF 10-20%) reduced left ventricular function is present. LVAD  cannula in the apex is not well seen. Velocities of the inflow and outflow not  interpretable.     Right Ventricle  Moderate right ventricular dilatation present. Global right ventricular  function is moderately reduced.     Aortic Valve  Aortic valve partially opens during each cardiac cycle.     Vessels  The inferior vena cava was normal in size with preserved respiratory  variability.        Pericardium  No pericardial effusion is present.     Compared to Previous Study  This study was compared with the study from 4.11.18, biventricular function  unchanged .  _____________________________________________________________________________  MMode/2D Measurements & Calculations  IVSd: 1.1 cm  LVIDd: 6.2 cm  LVIDs: 5.7 cm  LVPWd: 1.1 cm  FS: 8.8 %  LV mass(C)d: 291.2 grams  LV mass(C)dI: 127.4 grams/m2  RWT: 0.36     Doppler Measurements & Calculations  PA acc time: 0.10 sec        Assessment and Plan:    Jim Willingham is a 63 year old male with a past medical history of NICM (EF 20%) s/p HM II LVAD as DT (obesity).  Overall he has been doing well.   This is best I have seen him look in some time.  We reduced his Bumex to 1 mg twice daily again however he may need to go back up to 2 mg in the morning 1 mg in the afternoon every other day if his weight goes back up.     Chronic systolic heart failure secondary to NICM.    Stage D    NYHA Class III  ACEi/ARB: yes  BB:  on Toprol XL to 25 mg twice daily.  Aldosterone antagonist: Aldactone to 25 mg daily.   SCD prophylaxis: CRT-D  Fluid status: Euvolemic today-see direct plan above     LVAD S/P HM II LVAD 6/19/17:  MAP controlled on current regimen.  Anticoagulation: Warfarin INR goal 2-3.   Antiplatelet: restart ASA dose 81 mg daily.  LDH: Stable     CKD Stage III:Overall stable     Paroxysmal Atrial Fibrillation. Anticoagulated and on Toprol XL.       Iron deficiency:  iron studies are improved today as this is MCV and hemoglobin.  No further IV iron needed presently-we will continue to monitor his hemoglobin    Recent ventricular tachycardia with appropriate therapy-we will not change medical therapy for now.  I am reluctant to increase his beta-blocker given RV dysfunction.  I also do not want to start amiodarone given his already marginal pulmonary function.     Cardiac transplant eligibility: he needs to lose some more weight. Also his pulmonary function has been abnormal with his last PFTs showing FEV1 at 32% predicted. If he is able to lose some weight then we may repeat PFTs later this year.-I am hoping his PFTs will look better with further weight loss    RV dysfunction:  His last right heart cath suggestive of some right ventricular dysfunction with normal wedge pressure of 11, and elevated right atrial pressure of 17, and mildly decreased cardiac output.  This is stable for now on a lower dose of beta-blocker and at a lower speed.     Obesity-nutrition referral placed    RTC 3 months NP, 6 months with me          Delisa Montgomery MD

## 2018-12-07 NOTE — MR AVS SNAPSHOT
After Visit Summary   12/7/2018    Jim Willingham    MRN: [id number]           Patient Information     Date Of Birth          1955        Visit Information        Provider Department      12/7/2018 10:30 AM Delisa Montgomery MD [facility]        Today's Diagnoses     LVAD (left ventricular assist device) present (H)    -  1      Care Instructions    See nutritionist to discuss weight loss for transplant. Referral in.    See Nurse Practitioner in 3 months and Dr. Montgomery in 6 months. Just labs before these visits    Contact VAD team if your weight creeps up again              Follow-ups after your visit        Additional Services     NUTRITION REFERRAL                 Follow-up notes from your care team     Return in about 3 months (around 3/7/2019) for NP Routine visit.      Your next 10 appointments already scheduled     Mar 11, 2019 12:00 AM CDT   CARDIAC DEVICE CHECK - REMOTE with UC ICD REMOTE   [facility] ([facility])    [address] [place] [address]  [address] [place] [address]   [phone]            Mar 19, 2019  9:00 AM CDT   Lab with [employer] LAB   [facility] ([facility])    [address] [place] [address]  [address] [facility] [address]   [phone]            Mar 19, 2019  9:30 AM CDT   (Arrive by 9:15 AM)   CARDIAC DEVICE CHECK - IN CLINIC with UC CV DEVICE 1   [facility] ([facility])    [address] [place] [address]  3rd Floor  [facility] [address] [phone]            Mar 19, 2019 10:00 AM CDT   (Arrive by 9:45 AM)   Ventricular Assist Device with Cate Marshall NP   [facility] ([facility])    [address] [place] [address]  [address] [place] [address]   [phone]            Jun 07, 2019  9:00 AM CDT   Lab with [employer] LAB   [facility] ([facility])    [address] [place] [address]  [address] [facility] [address]  "  [phone]            Jun 07, 2019  9:30 AM CDT   (Arrive by 9:15 AM)   CARDIAC DEVICE CHECK - IN CLINIC with UC CV DEVICE 1   [facility] ([facility])    [address]  3rd Floor  [facility] [id number] [phone]            Jun 07, 2019 10:00 AM CDT   (Arrive by 9:45 AM)   Ventricular Assist Device with Delisa Montgomery MD   [facility] ([facility])    [address]  [address]  [facility] [address] [phone]              Who to contact     If you have questions or need follow up information about today's clinic visit or your schedule please contact [facility] directly at [phone].  Normal or non-critical lab and imaging results will be communicated to you by MyChart, letter or phone within 4 business days after the clinic has received the results. If you do not hear from us within 7 days, please contact the clinic through MyChart or phone. If you have a critical or abnormal lab result, we will notify you by phone as soon as possible.  Submit refill requests through [employer] or call your pharmacy and they will forward the refill request to us. Please allow 3 business days for your refill to be completed.          Additional Information About Your Visit        Care EveryWhere ID     This is your Care EveryWhere ID. This could be used by other organizations to access your [place] medical records  [id number]        Your Vitals Were     Pulse Temperature Height Pulse Oximetry BMI (Body Mass Index)       76 98.1  F (36.7  C) (Oral) 1.727 m (5' 8\") 98% 41.97 kg/m2        Blood Pressure from Last 3 Encounters:   12/07/18 (!) 74/0   11/20/18 (!) 88/0   08/16/18 125/87    Weight from Last 3 Encounters:   12/07/18 125.2 kg (276 lb)   11/20/18 120.2 kg (265 lb)   08/28/18 126.9 kg (279 lb 11.2 oz)              We Performed the Following     (14190) INTERROGATE VENT ASSIST DEVICE, IN PERSON, DAGO JETER " ANALYSIS PARAMETERS     NUTRITION REFERRAL        Primary Care Provider Office Phone # Fax #    Jovany Salas [phone]       [address] [address]        Equal Access to Services     JERROD VELASQUEZ : Hadii andrea ku haddorotao Soomaali, waaxda luqadaha, qaybta kaalmada adeegyada, kendra oseasin hayaan jaidamichael del angel laSavanahharesh . So [facility] [phone].    ATENCIÓN: Si habla español, tiene a roger disposición servicios gratuitos de asistencia lingüística. [facility] [phone].    We comply with applicable federal civil rights laws and [place] laws. We do not discriminate on the basis of race, color, national origin, age, disability, sex, sexual orientation, or gender identity.            Thank you!     Thank you for choosing [facility]  for your care. Our goal is always to provide you with excellent care. Hearing back from our patients is one way we can continue to improve our services. Please take a few minutes to complete the written survey that you may receive in the mail after your visit with us. Thank you!             Your Updated Medication List - Protect others around you: Learn how to safely use, store and throw away your medicines at www.disposemymeds.org.          This list is accurate as of 12/7/18 11:44 AM.  Always use your most recent med list.                   Brand Name Dispense Instructions for use Diagnosis    acetaminophen 325 MG tablet    TYLENOL    100 tablet    Take 2 tablets (650 mg) by mouth every 6 hours    Acute post-operative pain       ALDACTONE 25 MG tablet   Generic drug:  spironolactone     30 tablet    Take 1 tablet (25 mg) by mouth daily    LVAD (left ventricular assist device) present (H), Chronic systolic congestive heart failure (H), Nerve pain, Acute idiopathic gout, unspecified site       allopurinol 100 MG tablet    ZYLOPRIM    60 tablet    Take 1 tablet (100 mg) by mouth daily    Acute idiopathic gout, unspecified site        aspirin 81 MG chewable tablet    ASA    36 tablet    1 tablet (81 mg) by Oral or Feeding Tube route daily    LVAD (left ventricular assist device) present (H)       BREO ELLIPTA 200-25 MCG/INH inhaler   Generic drug:  fluticasone-vilanterol      Inhale 1 puff into the lungs daily        bumetanide 1 MG tablet    BUMEX    120 tablet    Take 2 tablets (2 mg) by mouth 2 times daily    LVAD (left ventricular assist device) present (H)       celeXA 20 MG tablet   Generic drug:  citalopram      Take 20 mg by mouth daily        gabapentin 300 MG capsule    NEURONTIN    90 capsule    Take 1 capsule (300 mg) by mouth 3 times daily    Nerve pain, LVAD (left ventricular assist device) present (H), Acute idiopathic gout, unspecified site, Chronic systolic congestive heart failure (H)       INCRUSE ELLIPTA 62.5 MCG/INH inhaler   Generic drug:  umeclidinium      Inhale 1 puff into the lungs daily        ipratropium - albuterol 0.5 mg/2.5 mg/3 mL 0.5-2.5 (3) MG/3ML neb solution    DUONEB     Take 3 mLs by nebulization every 4 hours as needed for shortness of breath / dyspnea or wheezing        losartan 25 MG tablet    COZAAR    90 tablet    Take 1.5 tablets (37.5 mg) by mouth daily    LVAD (left ventricular assist device) present (H)       metFORMIN 500 MG tablet    GLUCOPHAGE    60 tablet    Take 1 tablet (500 mg) by mouth 2 times daily (with meals)    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, without long-term current use of insulin, unspecified CKD stage (H)       metolazone 2.5 MG tablet    ZAROXOLYN    1 tablet    Take 1 tablet (2.5 mg) by mouth once for 1 dose    Chronic systolic congestive heart failure (H), LVAD (left ventricular assist device) present (H)       metoprolol succinate ER 25 MG 24 hr tablet    TOPROL-XL    180 tablet    Take 50mg in the morning and 25mg in the evening    Chronic systolic congestive heart failure (H), LVAD (left ventricular assist device) present (H)        pantoprazole 40 MG EC tablet    PROTONIX    60 tablet    Take 1 tablet (40 mg) by mouth every morning    Gastroesophageal reflux disease without esophagitis       potassium chloride ER 10 MEQ CR tablet    K-TAB/KLOR-CON    120 tablet    Take 2 tablets (20 mEq) by mouth daily    LVAD (left ventricular assist device) present (H), Chronic systolic congestive heart failure (H)       predniSONE 20 MG tablet    DELTASONE    20 tablet    Take 0.5 tablets (10 mg) by mouth daily    Acute idiopathic gout, unspecified site, LVAD (left ventricular assist device) present (H), Nerve pain, Chronic systolic congestive heart failure (H)       PROAIR  (90 Base) MCG/ACT inhaler   Generic drug:  albuterol      Inhale 2 puffs into the lungs every 4 hours as needed for shortness of breath / dyspnea or wheezing        SINGULAIR 10 MG tablet   Generic drug:  montelukast      Take 10 mg by mouth At Bedtime        traMADol 50 MG tablet    ULTRAM    28 tablet    Take 2 tablets (100 mg) by mouth every 6 hours as needed for moderate pain or breakthrough pain    Acute post-operative pain       vitamin B-12 1000 MCG/ML injection    CYANOCOBALAMIN     Inject 1,000 mcg into the muscle every 30 days        * warfarin 3 MG tablet    COUMADIN    30 tablet    Use as directed.    LVAD (left ventricular assist device) present (H)       * warfarin 5 MG tablet    COUMADIN    90 tablet    Take 5 - 7.5mg daily or as directed by coumadin clinic.    LVAD (left ventricular assist device) present (H), Chronic systolic congestive heart failure (H)       zinc sulfate 220 (50 Zn) MG capsule    ZINCATE     Take 220 mg by mouth 2 times daily        * Notice:  This list has 2 medication(s) that are the same as other medications prescribed for you. Read the directions carefully, and ask your doctor or other care provider to review them with you.

## 2018-12-07 NOTE — MR AVS SNAPSHOT
MRN:[id number]                      After Visit Summary   12/7/2018    Jim Willingham    MRN: [id number]           Visit Information        Provider Department      12/7/2018 10:00 AM UC CV DEVICE [address] [place] [address]        Your next 10 appointments already scheduled     Mar 11, 2019 12:00 AM CDT   CARDIAC DEVICE CHECK - REMOTE with UC ICD REMOTE   [facility] ([facility])    [address] [facility]  [address] [place] [address] [phone]              Care EveryWhere ID     This is your Care EveryWhere ID. This could be used by other organizations to access your [place] medical records  [id number]        Equal Access to Services     JERROD VELASQUEZ : Hadii andrea Gastelum, gatito james, sophie larson, kendra ely. So [facility] [phone].    ATENCIÓN: Si habla español, tiene a roger disposición servicios gratuitos de asistencia lingüística. Llame al [phone].    We comply with applicable federal civil rights laws and [place] laws. We do not discriminate on the basis of race, color, national origin, age, disability, sex, sexual orientation, or gender identity.

## 2018-12-07 NOTE — PATIENT INSTRUCTIONS
See nutritionist to discuss weight loss for transplant. Referral in.    See Nurse Practitioner in 3 months and Dr. Montgomery in 6 months. Just labs before these visits    Contact VAD team if your weight creeps up again

## 2018-12-07 NOTE — MR AVS SNAPSHOT
After Visit Summary   12/7/2018    Jim Willingham    MRN: [id number]           Patient Information     Date Of Birth          1955        Visit Information        Provider Department      12/7/2018 10:00 AM UC CV DEVICE [address] [place] [address]        Today's Diagnoses     NICM (nonischemic cardiomyopathy) (H)/ EF 20%           Follow-ups after your visit        Your next 10 appointments already scheduled     Mar 11, 2019 12:00 AM CDT   CARDIAC DEVICE CHECK - REMOTE with UC ICD REMOTE   [facility] ([facility])    [address] [place] [address]  [address] [place] [address]   [phone]            Mar 19, 2019  9:00 AM CDT   Lab with UC LAB   [facility] ([facility])    [address] [place] [address]  1st Floor  [facility] [address]   [phone]            Mar 19, 2019  9:30 AM CDT   (Arrive by 9:15 AM)   CARDIAC DEVICE CHECK - IN CLINIC with UC CV DEVICE 1   [facility] ([facility])    [address] [place] [address]  3rd Floor  [facility] [address]   [phone]            Mar 19, 2019 10:00 AM CDT   (Arrive by 9:45 AM)   Ventricular Assist Device with Cate Marshall NP   [facility] ([facility])    [address] [place] [address]  [address] [place] [address]   [phone]            Jun 07, 2019  9:00 AM CDT   Lab with UC LAB    [facility] ([facility])    [address] [place] [address]  1st Floor  [facility] [address]   [phone]            Jun 07, 2019  9:30 AM CDT   (Arrive by 9:15 AM)   CARDIAC DEVICE CHECK - IN CLINIC with UC CV DEVICE [address] [place] [address] Cardiac Services ([facility])    [address] [place] [address]  3rd Floor  [facility] [address]   [phone]            Jun 07, 2019 10:00 AM CDT   (Arrive by 9:45 AM)   Ventricular Assist Device with Delisa Montgomery MD   [facility] ([facility]    [address] [facility]  [address]  [facility] [address] [phone]              Who to contact     Please call your clinic at No information on file. to:    Ask questions about your health    Make or cancel appointments    Discuss your medicines    Learn about your test results    Speak to your doctor            Additional Information About Your Visit        Care EveryWhere ID     This is your Care EveryWhere ID. This could be used by other organizations to access your [place] medical records  [id number]         Blood Pressure from Last 3 Encounters:   12/07/18 (!) 74/0   11/20/18 (!) 88/0   08/16/18 125/87    Weight from Last 3 Encounters:   12/07/18 125.2 kg (276 lb)   11/20/18 120.2 kg (265 lb)   08/28/18 126.9 kg (279 lb 11.2 oz)              We Performed the Following     Follow-Up with Device Clinic - 3 months     ICD DEVICE PROGRAMMING EVAL, MULTI LEAD ICD        Primary Care Provider Office Phone # Fax #    Jovany Salas [phone]       [address] [address]        Equal Access to Services     GUERDA [facility] : Hadii aad ku hadasho Soomaali, waaxda luqadaha, qaybta kaalmada adeegyada, kendra salmon hayharesh marquez . So [facility] [phone].    ATENCIÓN: Si habla español, tiene a roger disposición servicios gratuitos de asistencia lingüística. [facility] [phone].    We comply with applicable federal civil rights laws and [place] laws. We do not discriminate on the basis of race, color, national origin, age, disability, sex, sexual orientation, or gender identity.            Thank you!     Thank you for choosing [facility]  for your care. Our goal is always to provide you with excellent care. Hearing back from our patients is one way we can continue to improve our services. Please take a few minutes to complete the written survey that you may receive in the mail after your visit with us. Thank you!              Your Updated Medication List - Protect others around you: Learn how to safely use, store and throw away your medicines at www.disposemymeds.org.          This list is accurate as of 12/7/18  5:07 PM.  Always use your most recent med list.                   Brand Name Dispense Instructions for use Diagnosis    acetaminophen 325 MG tablet    TYLENOL    100 tablet    Take 2 tablets (650 mg) by mouth every 6 hours    Acute post-operative pain       ALDACTONE 25 MG tablet   Generic drug:  spironolactone     30 tablet    Take 1 tablet (25 mg) by mouth daily    LVAD (left ventricular assist device) present (H), Chronic systolic congestive heart failure (H), Nerve pain, Acute idiopathic gout, unspecified site       allopurinol 100 MG tablet    ZYLOPRIM    60 tablet    Take 1 tablet (100 mg) by mouth daily    Acute idiopathic gout, unspecified site       aspirin 81 MG chewable tablet    ASA    36 tablet    1 tablet (81 mg) by Oral or Feeding Tube route daily    LVAD (left ventricular assist device) present (H)       BREO ELLIPTA 200-25 MCG/INH inhaler   Generic drug:  fluticasone-vilanterol      Inhale 1 puff into the lungs daily        bumetanide 1 MG tablet    BUMEX    120 tablet    Take 2 tablets (2 mg) by mouth 2 times daily    LVAD (left ventricular assist device) present (H)       celeXA 20 MG tablet   Generic drug:  citalopram      Take 20 mg by mouth daily        gabapentin 300 MG capsule    NEURONTIN    90 capsule    Take 1 capsule (300 mg) by mouth 3 times daily    Nerve pain, LVAD (left ventricular assist device) present (H), Acute idiopathic gout, unspecified site, Chronic systolic congestive heart failure (H)       INCRUSE ELLIPTA 62.5 MCG/INH inhaler   Generic drug:  umeclidinium      Inhale 1 puff into the lungs daily        ipratropium - albuterol 0.5 mg/2.5 mg/3 mL 0.5-2.5 (3) MG/3ML neb solution    DUONEB     Take 3 mLs by nebulization every 4 hours as needed for shortness of breath / dyspnea or wheezing         losartan 25 MG tablet    COZAAR    90 tablet    Take 1.5 tablets (37.5 mg) by mouth daily    LVAD (left ventricular assist device) present (H)       metFORMIN 500 MG tablet    GLUCOPHAGE    60 tablet    Take 1 tablet (500 mg) by mouth 2 times daily (with meals)    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, without long-term current use of insulin, unspecified CKD stage (H)       metolazone 2.5 MG tablet    ZAROXOLYN    1 tablet    Take 1 tablet (2.5 mg) by mouth once for 1 dose    Chronic systolic congestive heart failure (H), LVAD (left ventricular assist device) present (H)       metoprolol succinate ER 25 MG 24 hr tablet    TOPROL-XL    180 tablet    Take 50mg in the morning and 25mg in the evening    Chronic systolic congestive heart failure (H), LVAD (left ventricular assist device) present (H)       pantoprazole 40 MG EC tablet    PROTONIX    60 tablet    Take 1 tablet (40 mg) by mouth every morning    Gastroesophageal reflux disease without esophagitis       potassium chloride ER 10 MEQ CR tablet    K-TAB/KLOR-CON    120 tablet    Take 2 tablets (20 mEq) by mouth daily    LVAD (left ventricular assist device) present (H), Chronic systolic congestive heart failure (H)       predniSONE 20 MG tablet    DELTASONE    20 tablet    Take 0.5 tablets (10 mg) by mouth daily    Acute idiopathic gout, unspecified site, LVAD (left ventricular assist device) present (H), Nerve pain, Chronic systolic congestive heart failure (H)       PROAIR  (90 Base) MCG/ACT inhaler   Generic drug:  albuterol      Inhale 2 puffs into the lungs every 4 hours as needed for shortness of breath / dyspnea or wheezing        SINGULAIR 10 MG tablet   Generic drug:  montelukast      Take 10 mg by mouth At Bedtime        traMADol 50 MG tablet    ULTRAM    28 tablet    Take 2 tablets (100 mg) by mouth every 6 hours as needed for moderate pain or breakthrough pain    Acute post-operative pain        vitamin B-12 1000 MCG/ML injection    CYANOCOBALAMIN     Inject 1,000 mcg into the muscle every 30 days        * warfarin 3 MG tablet    COUMADIN    30 tablet    Use as directed.    LVAD (left ventricular assist device) present (H)       * warfarin 5 MG tablet    COUMADIN    90 tablet    Take 5 - 7.5mg daily or as directed by coumadin clinic.    LVAD (left ventricular assist device) present (H), Chronic systolic congestive heart failure (H)       zinc sulfate 220 (50 Zn) MG capsule    ZINCATE     Take 220 mg by mouth 2 times daily        * Notice:  This list has 2 medication(s) that are the same as other medications prescribed for you. Read the directions carefully, and ask your doctor or other care provider to review them with you.

## 2018-12-07 NOTE — PROGRESS NOTES
December 7, 2018      HPI:   Jim Willingham is a 63 year old male with a past medical history of NICM (EF 20%) s/p HM II LVAD placement (6/19/17) complicated by arrhythmias, WALTER, and small left pleural effusion, CKD Stage III, DM Type II, CECILIA, obesity, HTN, COPD, and asthma who is here for heart failure and LVAD follow up.     Today he reports feeling well overall.  His weight increased by around 10 pounds last week and he increased his Bumex to 2 mg in the morning and 1 mg at night-his weight is down close to his baseline weight again.  At her last visit he was iron deficient and we arrange for iron infusions.  His MCV has come up as is his hemoglobin from around 8-11 today.  He overall reports better energy with this.  Of note he was shocked about a month ago.  He did not lose consciousness.  He has had 2 more rounds of ATP since that time.  For those other episodes were he was asymptomatic.    From a pulmonary perspective he has had very little wheezing recently and has not had to have a steroid pulse or an antibiotic course recently.  He did get a flu shot.     He denies any chest pain, edema,  orthopnea, PND. . He denies fevers, chills, driveline redness, tenderness, or discharge. No LVAD alarms and denies any stroke symptoms or blood in the urine or stools.    He is still working on his diet which is the main barrier to cardiac transplant right now.       PAST MEDICAL HISTORY:  Past Medical History:   Diagnosis Date     Benign essential hypertension 5/11/2017     Cardiomyopathy, unspecified (H) 5/8/2017     CKD (chronic kidney disease) stage 3, GFR 30-59 ml/min (H) 5/11/2017     Depression 5/11/2017     Diabetes mellitus (H) 5/11/2017     H/O gastric bypass 5/11/2017     ICD (implantable cardioverter-defibrillator), biventricular, in situ 5/11/2017     LVAD (left ventricular assist device) present (H)      NICM (nonischemic cardiomyopathy) (H)/ EF 20% 5/11/2017    ECHO: LVEDd. 7.66 cm, Restrictive pattern ,  Severe mitral valve regurgitation     CECILIA (obstructive sleep apnea) 5/11/2017     Paroxysmal atrial fibrillation (H) 5/11/2017     Paroxysmal VT (H) 5/11/2017     Uncomplicated asthma      Vitamin B12 deficiency (non anemic) 5/11/2017       FAMILY HISTORY:  Family History   Problem Relation Age of Onset     Cerebrovascular Disease Mother      Diabetes Mother      Hypertension Mother      Coronary Artery Disease Father      Type 2 Diabetes Father      SOCIAL HISTORY:  Social History     Social History     Marital status:      Spouse name: N/A     Number of children: N/A     Years of education: N/A     Social History Main Topics     Smoking status: Former Smoker     Quit date: 1995     Smokeless tobacco: Not on file     Alcohol use No     Drug use: Not on file     Sexual activity: Yes     Partners: Female     Other Topics Concern     Parent/Sibling W/ Cabg, Mi Or Angioplasty Before 65f 55m? No     Social History Narrative       CURRENT MEDICATIONS:  Prescription Medications as of 12/7/2018             acetaminophen (TYLENOL) 325 MG tablet Take 2 tablets (650 mg) by mouth every 6 hours    albuterol (ALBUTEROL) 108 (90 BASE) MCG/ACT Inhaler Inhale 2 puffs into the lungs every 4 hours as needed for shortness of breath / dyspnea or wheezing    allopurinol (ZYLOPRIM) 100 MG tablet Take 1 tablet (100 mg) by mouth daily    aspirin 81 MG chewable tablet 1 tablet (81 mg) by Oral or Feeding Tube route daily    bumetanide (BUMEX) 1 MG tablet Take 2 tablets (2 mg) by mouth 2 times daily    citalopram (CELEXA) 20 MG tablet Take 20 mg by mouth daily    cyanocobalamin (VITAMIN B12) 1000 MCG/ML injection Inject 1,000 mcg into the muscle every 30 days    fluticasone-vilanterol (BREO ELLIPTA) 200-25 MCG/INH oral inhaler Inhale 1 puff into the lungs daily    gabapentin (NEURONTIN) 300 MG capsule Take 1 capsule (300 mg) by mouth 3 times daily    ipratropium - albuterol 0.5 mg/2.5 mg/3 mL (DUONEB) 0.5-2.5 (3) MG/3ML neb solution  "Take 3 mLs by nebulization every 4 hours as needed for shortness of breath / dyspnea or wheezing    losartan (COZAAR) 25 MG tablet Take 1.5 tablets (37.5 mg) by mouth daily    metFORMIN (GLUCOPHAGE) 500 MG tablet Take 1 tablet (500 mg) by mouth 2 times daily (with meals)    metoprolol succinate (TOPROL-XL) 25 MG 24 hr tablet Take 50mg in the morning and 25mg in the evening    montelukast (SINGULAIR) 10 MG tablet Take 10 mg by mouth At Bedtime    pantoprazole (PROTONIX) 40 MG EC tablet Take 1 tablet (40 mg) by mouth every morning    potassium chloride (K-TAB,KLOR-CON) 10 MEQ tablet Take 2 tablets (20 mEq) by mouth daily    predniSONE (DELTASONE) 20 MG tablet Take 0.5 tablets (10 mg) by mouth daily    spironolactone (ALDACTONE) 25 MG tablet Take 1 tablet (25 mg) by mouth daily    traMADol (ULTRAM) 50 MG tablet Take 2 tablets (100 mg) by mouth every 6 hours as needed for moderate pain or breakthrough pain    umeclidinium (INCRUSE ELLIPTA) 62.5 MCG/INH oral inhaler Inhale 1 puff into the lungs daily    warfarin (COUMADIN) 3 MG tablet Use as directed.    warfarin (COUMADIN) 5 MG tablet Take 5 - 7.5mg daily or as directed by coumadin clinic.    zinc sulfate (ZINCATE) 220 (50 ZN) MG capsule Take 220 mg by mouth 2 times daily    metolazone (ZAROXOLYN) 2.5 MG tablet Take 1 tablet (2.5 mg) by mouth once for 1 dose          EXAM:  BP (!) 74/0 (BP Location: Right arm, Patient Position: Chair, Cuff Size: Adult Large)  Pulse 76  Temp 98.1  F (36.7  C) (Oral)  Ht 1.727 m (5' 8\")  Wt 125.2 kg (276 lb)  SpO2 98%  BMI 41.97 kg/m2  General: appears comfortable, alert and articulate  Head: normocephalic, atraumatic  Eyes: anicteric sclera, EOMI  Neck: no adenopathy, neck supple  Orophyarynx: moist mucosa, no lesions, dentition intact  Heart: LVAD hum present, no murmur, gallop, rub.  JVP 11 cm  Lungs: clear, no rales or wheezing  Abdomen: soft, non-tender, bowel sounds present, no hepatomegaly.    Extremities: no clubbing, " cyanosis, trace edema.    Neurological: normal speech and affect, no gross motor deficits      Labs:    Lab Results   Component Value Date    WBC 8.3 12/07/2018    HGB 11.7 (L) 12/07/2018    HCT 39.0 (L) 12/07/2018     12/07/2018     12/07/2018    POTASSIUM 4.3 12/07/2018    CHLORIDE 103 12/07/2018    CO2 27 12/07/2018    BUN 34 (H) 12/07/2018    CR 1.28 (H) 12/07/2018     (H) 12/07/2018    SED 18 04/13/2018    NTBNPI 4216 (H) 06/15/2017    NTBNP 1351 (H) 07/06/2018    AST 24 12/07/2018    ALT 35 12/07/2018    ALKPHOS 99 12/07/2018    BILITOTAL 0.5 12/07/2018    INR 2.54 (H) 12/07/2018       ICD interrogation:   Patient seen in clinic for evaluation and iterative programming of Med[employer]onic Viva XT CRT-D multi lead ICD per MD orders.  Patient has an appointment to see Dr. Montgomery today.  Normal ICD function. There was a know history of 1 ATP and then Shock fot VT @ 220's bpm on Nov 20th at 4 pm.  Later that day, unbeknownst to the patient at 8pm had another VT episode @ same rate and morphology that successfully treated with 1 sequence of ATP. Since Nov 20th patient has had 6 NSVT and 2 SVT  episodes recorded. The NSVT lasting <1-6 seconds @ rates of 170-200 bpm.  The SVT 1:1 AV lasting 11-20 seconds @ 150-160 bpm.  Intrinsic rhythm = SR @ 75 bpm.  AP = 1.1%.   = 90.7%. LV pacing 96.6 Ventricular sense response pacing = 4.6%.  OptiVol fluid index is slightly elevated and increasing from baseline which could be an indication of fluid retention..  Estimated battery longevity to MARVA = 2.9 years.  No short v-v intervals recorded.  RV lead impedance trends appear stable.  Patient reports that he is feeling well and has a major life change with more family and grandkids lifiving with him so more stress..  Plan for patient to send a remote transmission in 3 months or be seen prior to next clinic visit.    Most recent echocardiogram:  Interpretation Summary  Technically difficult study.  HMII 9600  RPM  Severely (EF 10-20%) reduced left ventricular function is present. LVAD  cannula in the apex is not well seen. Velocities of the inflow and outflow not  interpretable.  Moderate right ventricular dilatation present. Global right ventricular  function is moderately reduced.  There is mitral and tricuspid regurgitation that is difficult to quantify on  this exam.  The inferior vena cava was normal in size with preserved respiratory  variability.  No pericardial effusion is present.       Left Ventricle  Severely (EF 10-20%) reduced left ventricular function is present. LVAD  cannula in the apex is not well seen. Velocities of the inflow and outflow not  interpretable.     Right Ventricle  Moderate right ventricular dilatation present. Global right ventricular  function is moderately reduced.     Aortic Valve  Aortic valve partially opens during each cardiac cycle.     Vessels  The inferior vena cava was normal in size with preserved respiratory  variability.        Pericardium  No pericardial effusion is present.     Compared to Previous Study  This study was compared with the study from 4.11.18, biventricular function  unchanged .  _____________________________________________________________________________  MMode/2D Measurements & Calculations  IVSd: 1.1 cm  LVIDd: 6.2 cm  LVIDs: 5.7 cm  LVPWd: 1.1 cm  FS: 8.8 %  LV mass(C)d: 291.2 grams  LV mass(C)dI: 127.4 grams/m2  RWT: 0.36     Doppler Measurements & Calculations  PA acc time: 0.10 sec        Assessment and Plan:    Jim Willingham is a 63 year old male with a past medical history of NICM (EF 20%) s/p HM II LVAD as DT (obesity).  Overall he has been doing well.  This is best I have seen him look in some time.  We reduced his Bumex to 1 mg twice daily again however he may need to go back up to 2 mg in the morning 1 mg in the afternoon every other day if his weight goes back up.     Chronic systolic heart failure secondary to NICM.    Stage D    NYHA Class  III  ACEi/ARB: yes  BB: on Toprol XL to 25 mg twice daily.  Aldosterone antagonist: Aldactone to 25 mg daily.   SCD prophylaxis: CRT-D  Fluid status: Euvolemic today-see direct plan above     LVAD S/P HM II LVAD 6/19/17:  MAP controlled on current regimen.  Anticoagulation: Warfarin INR goal 2-3.   Antiplatelet: restart ASA dose 81 mg daily.  LDH: Stable     CKD Stage III:Overall stable     Paroxysmal Atrial Fibrillation. Anticoagulated and on Toprol XL.       Iron deficiency:  iron studies are improved today as this is MCV and hemoglobin.  No further IV iron needed presently-we will continue to monitor his hemoglobin    Recent ventricular tachycardia with appropriate therapy-we will not change medical therapy for now.  I am reluctant to increase his beta-blocker given RV dysfunction.  I also do not want to start amiodarone given his already marginal pulmonary function.     Cardiac transplant eligibility: he needs to lose some more weight. Also his pulmonary function has been abnormal with his last PFTs showing FEV1 at 32% predicted. If he is able to lose some weight then we may repeat PFTs later this year.-I am hoping his PFTs will look better with further weight loss    RV dysfunction:  His last right heart cath suggestive of some right ventricular dysfunction with normal wedge pressure of 11, and elevated right atrial pressure of 17, and mildly decreased cardiac output.  This is stable for now on a lower dose of beta-blocker and at a lower speed.     Obesity-nutrition referral placed    RTC 3 months NP, 6 months with me         Delisa Montgomery MD   of Medicine   [facility] Division of Cardiology

## 2018-12-07 NOTE — NURSING NOTE
Chief Complaint   Patient presents with     Follow Up For     VAD     Medications reviewed and vitals performed.  Danna Glaser CMA

## 2018-12-14 ENCOUNTER — ANTICOAGULATION THERAPY VISIT (OUTPATIENT)
Dept: ANTICOAGULATION | Facility: CLINIC | Age: 63
End: 2018-12-14

## 2018-12-14 DIAGNOSIS — Z95.811 LVAD (LEFT VENTRICULAR ASSIST DEVICE) PRESENT (H): ICD-10-CM

## 2018-12-14 NOTE — PROGRESS NOTES
Called the patient and left message indicating an INR is due. Reminder given to have this done as soon as possible or contact the clinic.

## 2018-12-17 ENCOUNTER — ANTICOAGULATION THERAPY VISIT (OUTPATIENT)
Dept: ANTICOAGULATION | Facility: CLINIC | Age: 63
End: 2018-12-17

## 2018-12-17 DIAGNOSIS — Z95.811 LVAD (LEFT VENTRICULAR ASSIST DEVICE) PRESENT (H): ICD-10-CM

## 2018-12-17 DIAGNOSIS — Z79.01 LONG TERM CURRENT USE OF ANTICOAGULANT THERAPY: ICD-10-CM

## 2018-12-17 LAB — INR PPP: 2.36 (ref 0.86–1.14)

## 2018-12-17 PROCEDURE — 36415 COLL VENOUS BLD VENIPUNCTURE: CPT | Performed by: INTERNAL MEDICINE

## 2018-12-17 PROCEDURE — 85610 PROTHROMBIN TIME: CPT | Performed by: INTERNAL MEDICINE

## 2018-12-17 NOTE — PROGRESS NOTES
ANTICOAGULATION FOLLOW-UP CLINIC VISIT    Patient Name:  Jim Willingham  Date:  2018  Contact Type:  Telephone    SUBJECTIVE:     Patient Findings     Positives:   No Problem Findings           OBJECTIVE    INR   Date Value Ref Range Status   2018 2.36 (H) 0.86 - 1.14 Final       ASSESSMENT / PLAN  No question data found.  Anticoagulation Summary  As of 2018    INR goal:   2.0-3.0   TTR:   76.6 % (1.4 y)   INR used for dosin.36 (2018)   Warfarin maintenance plan:   10 mg (5 mg x 2) every Fri; 7.5 mg (5 mg x 1.5) all other days   Full warfarin instructions:   10 mg every Fri; 7.5 mg all other days   Weekly warfarin total:   55 mg   No change documented:   Maru Alonso RN   Plan last modified:   Brendan Montoya RN (2018)   Next INR check:   2018   Priority:   INR   Target end date:       Indications    LVAD (left ventricular assist device) present (H) [Z95.811]  Long-term (current) use of anticoagulants [Z79.01] [Z79.01]             Anticoagulation Episode Summary     INR check location:       Preferred lab:       Send INR reminders to:   [facility]    Comments:   HIPPA Forms mailed 17  Labs drawn either at [facility] or [facility]  LVAD placed 17   II  ASA 81 mg daily      Anticoagulation Care Providers     Provider Role Specialty Phone number    Delisa Montgomery MD [facility] [phone]            See the Encounter Report to view Anticoagulation Flowsheet and Dosing Calendar (Go to Encounters tab in chart review, and find the Anticoagulation Therapy Visit)    Spoke with Tatyana Alonso, RN

## 2018-12-31 ENCOUNTER — ANTICOAGULATION THERAPY VISIT (OUTPATIENT)
Dept: ANTICOAGULATION | Facility: CLINIC | Age: 63
End: 2018-12-31

## 2018-12-31 DIAGNOSIS — Z95.811 LVAD (LEFT VENTRICULAR ASSIST DEVICE) PRESENT (H): ICD-10-CM

## 2018-12-31 DIAGNOSIS — Z79.01 LONG TERM CURRENT USE OF ANTICOAGULANT THERAPY: ICD-10-CM

## 2018-12-31 LAB — INR PPP: 2.29 (ref 0.86–1.14)

## 2018-12-31 PROCEDURE — 85610 PROTHROMBIN TIME: CPT | Performed by: INTERNAL MEDICINE

## 2018-12-31 PROCEDURE — 36415 COLL VENOUS BLD VENIPUNCTURE: CPT | Performed by: INTERNAL MEDICINE

## 2018-12-31 NOTE — PROGRESS NOTES
ANTICOAGULATION FOLLOW-UP CLINIC VISIT    Patient Name:  Jim Willingham  Date:  2018  Contact Type:  Telephone    SUBJECTIVE:     Patient Findings     Positives:   No Problem Findings           OBJECTIVE    INR   Date Value Ref Range Status   2018 2.29 (H) 0.86 - 1.14 Final       ASSESSMENT / PLAN  INR assessment THER    Recheck INR In: 2 WEEKS    INR Location Clinic      Anticoagulation Summary  As of 2018    INR goal:   2.0-3.0   TTR:   77.2 % (1.5 y)   INR used for dosin.29 (2018)   Warfarin maintenance plan:   10 mg (5 mg x 2) every Fri; 7.5 mg (5 mg x 1.5) all other days   Full warfarin instructions:   10 mg every Fri; 7.5 mg all other days   Weekly warfarin total:   55 mg   No change documented:   Brendan Montoya RN   Plan last modified:   Brendan Montoya RN (2018)   Next INR check:   2019   Priority:   INR   Target end date:       Indications    LVAD (left ventricular assist device) present (H) [Z95.811]  Long-term (current) use of anticoagulants [Z79.01] [Z79.01]             Anticoagulation Episode Summary     INR check location:       Preferred lab:       Send INR reminders to:   [facility]    Comments:   HIPPA Forms mailed 17  Labs drawn either at [facility] or [facility]  LVAD placed 17   II  ASA 81 mg daily      Anticoagulation Care Providers     Provider Role Specialty Phone number    Delisa Montgomery MD [facility] Cardiology [phone]            See the Encounter Report to view Anticoagulation Flowsheet and Dosing Calendar (Go to Encounters tab in chart review, and find the Anticoagulation Therapy Visit)    Spoke with patient. Gave them their lab results and new warfarin recommendation.  No changes in health, medication, or diet. No missed doses, no falls. No signs or symptoms of bleed or clotting.    Patient had LVAD placed on:   17  Patient's current Aspirin dose: 81mg  LVAD Protocol followed:  Yes.   If Not  Followed Explanation:  Brendan Garcia, RN

## 2019-01-07 NOTE — NURSING NOTE
"Chief Complaint   Patient presents with   • Establish Care     Subjective   Estephanie Kolb is a 23 y.o. female who presents to the office to establish care and obtain referrals to OB/ GYN for an IUD placement, for psychiatry due to anxiety and mild depression, and for chiropractic for her chronic neck pain. Referrals sent. No new complaints or concerns today.     The following portions of the patient's history were reviewed and updated as appropriate: allergies, current medications, past family history, past medical history, past social history, past surgical history and problem list.    History of Present Illness     Past Medical History:   Diagnosis Date   • Anxiety    • Depression    • Headache    • Low back pain           Family History   Problem Relation Age of Onset   • Anxiety disorder Mother    • Hypertension Mother    • Asthma Father    • COPD Paternal Grandmother         Review of Systems   Constitutional: Negative.  Negative for fever and unexpected weight change.   HENT: Negative.    Eyes: Negative.    Respiratory: Negative.  Negative for cough, chest tightness and shortness of breath.    Cardiovascular: Negative.  Negative for chest pain.   Gastrointestinal: Negative.    Endocrine: Negative.    Genitourinary: Negative.  Negative for dysuria.   Musculoskeletal: Negative.    Skin: Negative.  Negative for color change, pallor, rash and wound.   Allergic/Immunologic: Negative.    Neurological: Negative.    Hematological: Negative.    Psychiatric/Behavioral: Negative.  Negative for sleep disturbance and suicidal ideas.       Objective   Vitals:    01/07/19 1459   BP: 122/78   BP Location: Left arm   Patient Position: Sitting   Cuff Size: Adult   Pulse: 83   Resp: 14   Temp: 98.6 °F (37 °C)   TempSrc: Oral   SpO2: 100%   Weight: 82.1 kg (181 lb)   Height: 160 cm (63\")   PainSc:   6   PainLoc: Neck     Physical Exam   Constitutional: She is oriented to person, place, and time. She appears well-developed and " 1). PUMP DATA  Primary controller serial number: [id number]     II:   Flow: 6.4 L/min,    Speed: 9600 RPMs,     PI: 5.4 ,  Power: 6.5 Manuel,      Primary controller   Back up battery: Patient use: 44, Replace in: 25  Months     Data downloaded: No   Equipment and driveline assessed for damage: Yes     Back up : Serial number: [id number]  Back up battery: Patient use: 4 Replace in: 26  Months  Programmed settings identical to the settings on the primary controller :Yes      Education complete: Yes   Charge the BACKUP controller s backup battery every 6 months  Perform a self test on BACKUP every 6 months  Change the MPU s batteries every 6 months:Yes  Have equipment serviced yearly (if applicable):Yes    2). ALARMS  Alarms reported by patient since last pump evaluation: No  Alarms or other finding noted during pump data history and alarm download: Yes - several NO EXTERNAL POWER alarms noted (1-2 each day). Discussed with pt, who stated the power outlet is loose and he is having someone come to repair.     Action Taken:  Reviewed data with patient: Yes      3). DRESSING CHANGE / DRIVELINE ASSESSMENT  Dressing change completed today: No  Appearance of Driveline site: c/d/i per pt report    Driveline stabilization: Method: [employer]  [ Teaching reinforced on need for stabilization of Driveline. ]     well-nourished.   HENT:   Head: Normocephalic and atraumatic.   Eyes: Conjunctivae are normal. Pupils are equal, round, and reactive to light.   Neck: Normal range of motion. Neck supple.   Cardiovascular: Normal rate, regular rhythm, normal heart sounds and intact distal pulses. Exam reveals no gallop and no friction rub.   No murmur heard.  Pulmonary/Chest: Effort normal and breath sounds normal. No respiratory distress. She has no wheezes. She has no rales. She exhibits no tenderness.   Abdominal: Soft. Bowel sounds are normal.   Musculoskeletal: Normal range of motion. She exhibits no edema, tenderness or deformity.   Lymphadenopathy:     She has no cervical adenopathy.   Neurological: She is alert and oriented to person, place, and time.   Skin: Skin is warm and dry. Capillary refill takes 2 to 3 seconds. No erythema. No pallor.   Psychiatric: She has a normal mood and affect. Her behavior is normal. Judgment and thought content normal.   Nursing note and vitals reviewed.      Assessment/Plan   Estephanie was seen today for establish care.    Diagnoses and all orders for this visit:    Chronic neck pain  Comments:  desires referral to chiropractic  Orders:  -     Ambulatory Referral to Chiropractic    Generalized anxiety disorder  Comments:  desires referral to psychiatry, placed  Orders:  -     Ambulatory Referral to Psychiatry    Current mild episode of major depressive disorder, unspecified whether recurrent (CMS/[facility])  Comments:  desires referral to psychiatry  Orders:  -     Ambulatory Referral to Psychiatry    Contraceptive device, intrauterine  Comments:  desires IUD instead of current OCP, referral to [facility]  Orders:  -     Ambulatory Referral to Gynecology           PHQ-2/PHQ-9 Depression Screening 1/7/2019   Little interest or pleasure in doing things 3   Feeling down, depressed, or hopeless 3   Trouble falling or staying asleep, or sleeping too much 3   Feeling tired or having little energy 3    Poor appetite or overeating 1   Feeling bad about yourself - or that you are a failure or have let yourself or your family down 3   Trouble concentrating on things, such as reading the newspaper or watching television 0   Moving or speaking so slowly that other people could have noticed. Or the opposite - being so fidgety or restless that you have been moving around a lot more than usual 1   Thoughts that you would be better off dead, or of hurting yourself in some way 1   Total Score 18   If you checked off any problems, how difficult have these problems made it for you to do your work, take care of things at home, or get along with other people? Extremely dIfficult       Daphne Nicholson, APRVIOLETTE         Return in about 6 months (around 7/7/2019) for Next scheduled follow up.    There are no Patient Instructions on file for this visit.

## 2019-01-14 ENCOUNTER — ANTICOAGULATION THERAPY VISIT (OUTPATIENT)
Dept: ANTICOAGULATION | Facility: CLINIC | Age: 64
End: 2019-01-14

## 2019-01-14 DIAGNOSIS — Z95.811 LVAD (LEFT VENTRICULAR ASSIST DEVICE) PRESENT (H): ICD-10-CM

## 2019-01-14 DIAGNOSIS — Z79.01 LONG TERM CURRENT USE OF ANTICOAGULANT THERAPY: ICD-10-CM

## 2019-01-14 LAB — INR PPP: 1.85 (ref 0.86–1.14)

## 2019-01-14 PROCEDURE — 85610 PROTHROMBIN TIME: CPT | Performed by: INTERNAL MEDICINE

## 2019-01-14 PROCEDURE — 36415 COLL VENOUS BLD VENIPUNCTURE: CPT | Performed by: INTERNAL MEDICINE

## 2019-01-14 NOTE — PROGRESS NOTES
ANTICOAGULATION FOLLOW-UP CLINIC VISIT    Patient Name:  Jim Willingham  Date:  2019  Contact Type:  Telephone    SUBJECTIVE:     Patient Findings     Comments:   Instructions were given to increase ASA to 325mg daily until INR back in goal range. Patient reports that he ate 3 zambrano of asparagus over the weekend.            OBJECTIVE    INR   Date Value Ref Range Status   2019 1.85 (H) 0.86 - 1.14 Final       ASSESSMENT / PLAN  No question data found.  Anticoagulation Summary  As of 2019    INR goal:   2.0-3.0   TTR:   77.0 % (1.5 y)   INR used for dosin.85! (2019)   Warfarin maintenance plan:   10 mg (5 mg x 2) every Fri; 7.5 mg (5 mg x 1.5) all other days   Full warfarin instructions:   : 10 mg; 1/15: 10 mg; Otherwise 10 mg every Fri; 7.5 mg all other days   Weekly warfarin total:   55 mg   Plan last modified:   Brendan Montoya RN (2018)   Next INR check:   2019   Priority:   INR   Target end date:       Indications    LVAD (left ventricular assist device) present (H) [Z95.811]  Long-term (current) use of anticoagulants [Z79.01] [Z79.01]             Anticoagulation Episode Summary     INR check location:       Preferred lab:       Send INR reminders to:   [facility]    Comments:   HIPPA Forms mailed 17  Labs drawn either at [facility] or [facility]  LVAD placed 17   II  ASA 81 mg daily      Anticoagulation Care Providers     Provider Role Specialty Phone number    Delisa Montgomery MD [facility] Cardiology [phone]            See the Encounter Report to view Anticoagulation Flowsheet and Dosing Calendar (Go to Encounters tab in chart review, and find the Anticoagulation Therapy Visit)    Spoke with Jim.     Maru Alonso RN

## 2019-01-17 ENCOUNTER — ANTICOAGULATION THERAPY VISIT (OUTPATIENT)
Dept: ANTICOAGULATION | Facility: CLINIC | Age: 64
End: 2019-01-17

## 2019-01-17 DIAGNOSIS — Z95.811 LVAD (LEFT VENTRICULAR ASSIST DEVICE) PRESENT (H): ICD-10-CM

## 2019-01-17 DIAGNOSIS — Z79.01 LONG TERM CURRENT USE OF ANTICOAGULANT THERAPY: ICD-10-CM

## 2019-01-17 LAB — INR PPP: 2.08 (ref 0.86–1.14)

## 2019-01-17 PROCEDURE — 36415 COLL VENOUS BLD VENIPUNCTURE: CPT | Performed by: INTERNAL MEDICINE

## 2019-01-17 PROCEDURE — 85610 PROTHROMBIN TIME: CPT | Performed by: INTERNAL MEDICINE

## 2019-01-17 NOTE — PROGRESS NOTES
ANTICOAGULATION FOLLOW-UP CLINIC VISIT    Patient Name:  Jim Willingham  Date:  2019  Contact Type:  Telephone    SUBJECTIVE:     Patient Findings     Comments:   Instructions were given to resume ASA 81mg daily.            OBJECTIVE    INR   Date Value Ref Range Status   2019 2.08 (H) 0.86 - 1.14 Final       ASSESSMENT / PLAN  No question data found.  Anticoagulation Summary  As of 2019    INR goal:   2.0-3.0   TTR:   76.7 % (1.5 y)   INR used for dosin.08 (2019)   Warfarin maintenance plan:   No maintenance plan   Full warfarin instructions:   : 7.5 mg; : 10 mg; : 7.5 mg; : 7.5 mg; : 10 mg; : 7.5 mg; : 7.5 mg   Plan last modified:   Maru Alonso RN (2019)   Next INR check:   2019   Priority:   INR   Target end date:       Indications    LVAD (left ventricular assist device) present (H) [Z95.811]  Long-term (current) use of anticoagulants [Z79.01] [Z79.01]             Anticoagulation Episode Summary     INR check location:       Preferred lab:       Send INR reminders to:   [facility]    Comments:   HIPPA Forms mailed 17  Labs drawn either at [facility] or [facility]  LVAD placed 17   II  ASA 81 mg daily      Anticoagulation Care Providers     Provider Role Specialty Phone number    Delisa Montgomery MD [facility] Cardiology [phone]            See the Encounter Report to view Anticoagulation Flowsheet and Dosing Calendar (Go to Encounters tab in chart review, and find the Anticoagulation Therapy Visit)    Spoke with Tatyana Alonso RN

## 2019-01-24 ENCOUNTER — ANTICOAGULATION THERAPY VISIT (OUTPATIENT)
Dept: ANTICOAGULATION | Facility: CLINIC | Age: 64
End: 2019-01-24

## 2019-01-24 DIAGNOSIS — Z95.811 LVAD (LEFT VENTRICULAR ASSIST DEVICE) PRESENT (H): ICD-10-CM

## 2019-01-24 NOTE — PROGRESS NOTES
ANTICOAGULATION FOLLOW-UP CLINIC VISIT    Patient Name:  Jim Willingham  Date:  1/24/2019  Contact Type:  Telephone    SUBJECTIVE:        OBJECTIVE    INR   Date Value Ref Range Status   01/17/2019 2.08 (H) 0.86 - 1.14 Final       ASSESSMENT / PLAN  No question data found.  Anticoagulation Summary  As of 1/24/2019    INR goal:   2.0-3.0   TTR:   76.7 % (1.5 y)   INR used for dosing:   No new INR was available at the time of this encounter.   Warfarin maintenance plan:   No maintenance plan   Full warfarin instructions:   1/24: 7.5 mg   Plan last modified:   Maru Alonso RN (1/17/2019)   Next INR check:   1/25/2019   Priority:   INR   Target end date:       Indications    LVAD (left ventricular assist device) present (H) [Z95.811]  Long-term (current) use of anticoagulants [Z79.01] [Z79.01]             Anticoagulation Episode Summary     INR check location:       Preferred lab:       Send INR reminders to:   [facility]    Comments:   HIPPA Forms mailed 6/26/17  Labs drawn either at [facility] or [facility]  LVAD placed 6/19/17   II  ASA 81 mg daily      Anticoagulation Care Providers     Provider Role Specialty Phone number    Delisa Montgomery MD Responsible Cardiology [phone]            See the Encounter Report to view Anticoagulation Flowsheet and Dosing Calendar (Go to Encounters tab in chart review, and find the Anticoagulation Therapy Visit)    Pt phoned & reminded he is due for INR today.  He says he will get this on 1/25    Suyapa Crawford RN

## 2019-01-25 ENCOUNTER — ANTICOAGULATION THERAPY VISIT (OUTPATIENT)
Dept: ANTICOAGULATION | Facility: CLINIC | Age: 64
End: 2019-01-25

## 2019-01-25 DIAGNOSIS — Z95.811 LVAD (LEFT VENTRICULAR ASSIST DEVICE) PRESENT (H): ICD-10-CM

## 2019-01-25 DIAGNOSIS — Z79.01 LONG TERM CURRENT USE OF ANTICOAGULANT THERAPY: ICD-10-CM

## 2019-01-25 LAB — INR PPP: 2.52 (ref 0.86–1.14)

## 2019-01-25 PROCEDURE — 36415 COLL VENOUS BLD VENIPUNCTURE: CPT | Performed by: INTERNAL MEDICINE

## 2019-01-25 PROCEDURE — 85610 PROTHROMBIN TIME: CPT | Performed by: INTERNAL MEDICINE

## 2019-01-25 NOTE — PROGRESS NOTES
ANTICOAGULATION FOLLOW-UP CLINIC VISIT    Patient Name:  Jim Willingham  Date:  2019  Contact Type:  Telephone    SUBJECTIVE:     Patient Findings     Positives:   No Problem Findings           OBJECTIVE    INR   Date Value Ref Range Status   2019 2.52 (H) 0.86 - 1.14 Final       ASSESSMENT / PLAN  No question data found.  Anticoagulation Summary  As of 2019    INR goal:   2.0-3.0   TTR:   77.1 % (1.5 y)   INR used for dosin.52 (2019)   Warfarin maintenance plan:   10 mg (5 mg x 2) every Mon, Fri; 7.5 mg (5 mg x 1.5) all other days   Full warfarin instructions:   10 mg every Mon, Fri; 7.5 mg all other days   Weekly warfarin total:   57.5 mg   Plan last modified:   Maru Alosno RN (2019)   Next INR check:   2019   Priority:   INR   Target end date:       Indications    LVAD (left ventricular assist device) present (H) [Z95.811]  Long-term (current) use of anticoagulants [Z79.01] [Z79.01]             Anticoagulation Episode Summary     INR check location:       Preferred lab:       Send INR reminders to:   [facility]    Comments:   HIPPA Forms mailed 17  Labs drawn either at [facility] or [facility]  LVAD placed 17   II  ASA 81 mg daily      Anticoagulation Care Providers     Provider Role Specialty Phone number    Delisa Montgomery MD [facility] Cardiology [phone]            See the Encounter Report to view Anticoagulation Flowsheet and Dosing Calendar (Go to Encounters tab in chart review, and find the Anticoagulation Therapy Visit)    Spoke with Tatyana Alonso, RN

## 2019-02-01 ENCOUNTER — ANTICOAGULATION THERAPY VISIT (OUTPATIENT)
Dept: ANTICOAGULATION | Facility: CLINIC | Age: 64
End: 2019-02-01

## 2019-02-01 DIAGNOSIS — Z79.01 LONG TERM CURRENT USE OF ANTICOAGULANT THERAPY: ICD-10-CM

## 2019-02-01 DIAGNOSIS — Z95.811 LVAD (LEFT VENTRICULAR ASSIST DEVICE) PRESENT (H): ICD-10-CM

## 2019-02-01 LAB — INR PPP: 3.24 (ref 0.86–1.14)

## 2019-02-01 PROCEDURE — 85610 PROTHROMBIN TIME: CPT | Performed by: INTERNAL MEDICINE

## 2019-02-01 PROCEDURE — 36415 COLL VENOUS BLD VENIPUNCTURE: CPT | Performed by: INTERNAL MEDICINE

## 2019-02-01 NOTE — PROGRESS NOTES
ANTICOAGULATION FOLLOW-UP CLINIC VISIT    Patient Name:  Jim Willingham  Date:  2/1/2019  Contact Type:  Telephone    SUBJECTIVE:     Patient Findings     Positives:   Unexplained INR or factor level change    Comments:   Pt reports he continues to eat his normal amount of greens and is surprised that his INR is up. No other changes and reports that he has some bruises, but this isn't new            OBJECTIVE    INR   Date Value Ref Range Status   02/01/2019 3.24 (H) 0.86 - 1.14 Final       ASSESSMENT / PLAN  INR assessment SUPRA    Recheck INR In: 5 DAYS    INR Location Clinic      Anticoagulation Summary  As of 2/1/2019    INR goal:   2.0-3.0   TTR:   76.9 % (1.6 y)   INR used for dosing:   3.24! (2/1/2019)   Warfarin maintenance plan:   10 mg (5 mg x 2) every Mon, Fri; 7.5 mg (5 mg x 1.5) all other days   Full warfarin instructions:   2/1: 7.5 mg; Otherwise 10 mg every Mon, Fri; 7.5 mg all other days   Weekly warfarin total:   57.5 mg   Plan last modified:   Maru Alonso RN (1/25/2019)   Next INR check:   2/6/2019   Priority:   INR   Target end date:       Indications    LVAD (left ventricular assist device) present (H) [Z95.811]  Long-term (current) use of anticoagulants [Z79.01] [Z79.01]             Anticoagulation Episode Summary     INR check location:       Preferred lab:       Send INR reminders to:   [facility]    Comments:   HIPPA Forms mailed 6/26/17  Labs drawn either at [facility] or [facility]  LVAD placed 6/19/17   II  ASA 81 mg daily      Anticoagulation Care Providers     Provider Role Specialty Phone number    Delisa Montgomery MD Responsible Cardiology [phone]            See the Encounter Report to view Anticoagulation Flowsheet and Dosing Calendar (Go to Encounters tab in chart review, and find the Anticoagulation Therapy Visit)    Spoke with patient. Gave them their lab results and new warfarin recommendation.  No changes in health, medication, or diet. No  missed doses, no falls. No signs or symptoms of bleed or clotting.     Radha Pedro, RN

## 2019-02-06 ENCOUNTER — ANTICOAGULATION THERAPY VISIT (OUTPATIENT)
Dept: ANTICOAGULATION | Facility: CLINIC | Age: 64
End: 2019-02-06

## 2019-02-06 DIAGNOSIS — Z95.811 LVAD (LEFT VENTRICULAR ASSIST DEVICE) PRESENT (H): ICD-10-CM

## 2019-02-06 DIAGNOSIS — Z79.01 LONG TERM CURRENT USE OF ANTICOAGULANT THERAPY: ICD-10-CM

## 2019-02-06 LAB — INR PPP: 2.2 (ref 0.86–1.14)

## 2019-02-06 PROCEDURE — 36415 COLL VENOUS BLD VENIPUNCTURE: CPT | Performed by: INTERNAL MEDICINE

## 2019-02-06 PROCEDURE — 85610 PROTHROMBIN TIME: CPT | Performed by: INTERNAL MEDICINE

## 2019-02-06 NOTE — PROGRESS NOTES
ANTICOAGULATION FOLLOW-UP CLINIC VISIT    Patient Name:  Jim Willingham  Date:  2019  Contact Type:  Telephone    SUBJECTIVE:        OBJECTIVE    INR   Date Value Ref Range Status   2019 2.20 (H) 0.86 - 1.14 Final       ASSESSMENT / PLAN  No question data found.  Anticoagulation Summary  As of 2019    INR goal:   2.0-3.0   TTR:   76.9 % (1.6 y)   INR used for dosin.20 (2019)   Warfarin maintenance plan:   10 mg (5 mg x 2) every Mon, Fri; 7.5 mg (5 mg x 1.5) all other days   Full warfarin instructions:   10 mg every Mon, Fri; 7.5 mg all other days   Weekly warfarin total:   57.5 mg   No change documented:   Maru Alonso RN   Plan last modified:   Maru Alonso RN (2019)   Next INR check:   2019   Priority:   INR   Target end date:       Indications    LVAD (left ventricular assist device) present (H) [Z95.811]  Long-term (current) use of anticoagulants [Z79.01] [Z79.01]             Anticoagulation Episode Summary     INR check location:       Preferred lab:       Send INR reminders to:   [facility]    Comments:   HIPPA Forms mailed 17  Labs drawn either at [facility] or [facility]  LVAD placed 17   II  ASA 81 mg daily      Anticoagulation Care Providers     Provider Role Specialty Phone number    Delisa Montgomery MD [facility] Cardiology [phone]            See the Encounter Report to view Anticoagulation Flowsheet and Dosing Calendar (Go to Encounters tab in chart review, and find the Anticoagulation Therapy Visit)    Left message for patient with results and dosing recommendations. Asked patient to call back to report any missed doses, falls, signs and symptoms of bleeding or clotting, any changes in health, medication, or diet. Asked patient to call back with any questions or concerns.     Maru Alonso RN

## 2019-02-12 ENCOUNTER — HOSPITAL ENCOUNTER (EMERGENCY)
Facility: CLINIC | Age: 64
Discharge: HOME | End: 2019-02-12
Attending: EMERGENCY MEDICINE | Admitting: EMERGENCY MEDICINE
Payer: COMMERCIAL

## 2019-02-12 ENCOUNTER — CARE COORDINATION (OUTPATIENT)
Dept: CARDIOLOGY | Facility: CLINIC | Age: 64
End: 2019-02-12

## 2019-02-12 ENCOUNTER — ANTICOAGULATION THERAPY VISIT (OUTPATIENT)
Dept: ANTICOAGULATION | Facility: CLINIC | Age: 64
End: 2019-02-12

## 2019-02-12 ENCOUNTER — APPOINTMENT (OUTPATIENT)
Dept: CT IMAGING | Facility: CLINIC | Age: 64
End: 2019-02-12
Attending: EMERGENCY MEDICINE
Payer: COMMERCIAL

## 2019-02-12 VITALS
RESPIRATION RATE: 15 BRPM | WEIGHT: 275 LBS | HEART RATE: 77 BPM | SYSTOLIC BLOOD PRESSURE: 89 MMHG | OXYGEN SATURATION: 99 % | HEIGHT: 71 IN | DIASTOLIC BLOOD PRESSURE: 65 MMHG | BODY MASS INDEX: 38.5 KG/M2 | TEMPERATURE: 98.3 F

## 2019-02-12 DIAGNOSIS — R41.0 CONFUSION: ICD-10-CM

## 2019-02-12 DIAGNOSIS — Z95.811 LVAD (LEFT VENTRICULAR ASSIST DEVICE) PRESENT (H): ICD-10-CM

## 2019-02-12 LAB
ALBUMIN SERPL-MCNC: 3.6 G/DL (ref 3.4–5)
ALBUMIN UR-MCNC: NEGATIVE MG/DL
ALP SERPL-CCNC: 98 U/L (ref 40–150)
ALT SERPL W P-5'-P-CCNC: 50 U/L (ref 0–70)
ANION GAP SERPL CALCULATED.3IONS-SCNC: 20 MMOL/L (ref 3–14)
APPEARANCE UR: CLEAR
APTT PPP: 36 SEC (ref 22–37)
AST SERPL W P-5'-P-CCNC: 24 U/L (ref 0–45)
BASOPHILS # BLD AUTO: 0.1 10E9/L (ref 0–0.2)
BASOPHILS NFR BLD AUTO: 0.9 %
BILIRUB SERPL-MCNC: 0.5 MG/DL (ref 0.2–1.3)
BILIRUB UR QL STRIP: NEGATIVE
BUN SERPL-MCNC: 35 MG/DL (ref 7–30)
CALCIUM SERPL-MCNC: 8.3 MG/DL (ref 8.5–10.1)
CHLORIDE SERPL-SCNC: 102 MMOL/L (ref 94–109)
CO2 SERPL-SCNC: 16 MMOL/L (ref 20–32)
COLOR UR AUTO: NORMAL
CREAT SERPL-MCNC: 1.47 MG/DL (ref 0.66–1.25)
DIFFERENTIAL METHOD BLD: ABNORMAL
EOSINOPHIL # BLD AUTO: 0 10E9/L (ref 0–0.7)
EOSINOPHIL NFR BLD AUTO: 0.3 %
ERYTHROCYTE [DISTWIDTH] IN BLOOD BY AUTOMATED COUNT: 14.9 % (ref 10–15)
GFR SERPL CREATININE-BSD FRML MDRD: 51 ML/MIN/{1.73_M2}
GLUCOSE SERPL-MCNC: 219 MG/DL (ref 70–99)
GLUCOSE UR STRIP-MCNC: NEGATIVE MG/DL
HCT VFR BLD AUTO: 37.7 % (ref 40–53)
HGB BLD-MCNC: 11.6 G/DL (ref 13.3–17.7)
HGB UR QL STRIP: NEGATIVE
IMM GRANULOCYTES # BLD: 0 10E9/L (ref 0–0.4)
IMM GRANULOCYTES NFR BLD: 0.5 %
INR PPP: 2.54 (ref 0.86–1.14)
KETONES UR STRIP-MCNC: NEGATIVE MG/DL
LEUKOCYTE ESTERASE UR QL STRIP: NEGATIVE
LYMPHOCYTES # BLD AUTO: 1.3 10E9/L (ref 0.8–5.3)
LYMPHOCYTES NFR BLD AUTO: 22.4 %
MCH RBC QN AUTO: 28.9 PG (ref 26.5–33)
MCHC RBC AUTO-ENTMCNC: 30.8 G/DL (ref 31.5–36.5)
MCV RBC AUTO: 94 FL (ref 78–100)
MONOCYTES # BLD AUTO: 0.5 10E9/L (ref 0–1.3)
MONOCYTES NFR BLD AUTO: 7.8 %
NEUTROPHILS # BLD AUTO: 3.9 10E9/L (ref 1.6–8.3)
NEUTROPHILS NFR BLD AUTO: 68.1 %
NITRATE UR QL: NEGATIVE
NRBC # BLD AUTO: 0 10*3/UL
NRBC BLD AUTO-RTO: 0 /100
PH UR STRIP: 6 PH (ref 5–7)
PLATELET # BLD AUTO: 167 10E9/L (ref 150–450)
POTASSIUM SERPL-SCNC: 4.3 MMOL/L (ref 3.4–5.3)
PROT SERPL-MCNC: 6.9 G/DL (ref 6.8–8.8)
RBC # BLD AUTO: 4.01 10E12/L (ref 4.4–5.9)
SODIUM SERPL-SCNC: 138 MMOL/L (ref 133–144)
SOURCE: NORMAL
SP GR UR STRIP: 1.01 (ref 1–1.03)
TROPONIN I SERPL-MCNC: 0.02 UG/L (ref 0–0.04)
UROBILINOGEN UR STRIP-MCNC: NORMAL MG/DL (ref 0–2)
WBC # BLD AUTO: 5.8 10E9/L (ref 4–11)

## 2019-02-12 PROCEDURE — 99284 EMERGENCY DEPT VISIT MOD MDM: CPT | Mod: Z6 | Performed by: EMERGENCY MEDICINE

## 2019-02-12 PROCEDURE — 85730 THROMBOPLASTIN TIME PARTIAL: CPT | Performed by: EMERGENCY MEDICINE

## 2019-02-12 PROCEDURE — 99284 EMERGENCY DEPT VISIT MOD MDM: CPT | Mod: 25

## 2019-02-12 PROCEDURE — 25000132 ZZH RX MED GY IP 250 OP 250 PS 637: Mod: GY | Performed by: EMERGENCY MEDICINE

## 2019-02-12 PROCEDURE — 81003 URINALYSIS AUTO W/O SCOPE: CPT | Performed by: EMERGENCY MEDICINE

## 2019-02-12 PROCEDURE — 84484 ASSAY OF TROPONIN QUANT: CPT | Performed by: EMERGENCY MEDICINE

## 2019-02-12 PROCEDURE — 85610 PROTHROMBIN TIME: CPT | Performed by: EMERGENCY MEDICINE

## 2019-02-12 PROCEDURE — 80053 COMPREHEN METABOLIC PANEL: CPT | Performed by: EMERGENCY MEDICINE

## 2019-02-12 PROCEDURE — 70450 CT HEAD/BRAIN W/O DYE: CPT

## 2019-02-12 PROCEDURE — A9270 NON-COVERED ITEM OR SERVICE: HCPCS | Mod: GY | Performed by: EMERGENCY MEDICINE

## 2019-02-12 PROCEDURE — 85025 COMPLETE CBC W/AUTO DIFF WBC: CPT | Performed by: EMERGENCY MEDICINE

## 2019-02-12 RX ORDER — ACETAMINOPHEN 325 MG/1
975 TABLET ORAL ONCE
Status: COMPLETED | OUTPATIENT
Start: 2019-02-12 | End: 2019-02-12

## 2019-02-12 RX ADMIN — ACETAMINOPHEN 975 MG: 325 TABLET, FILM COATED ORAL at 03:34

## 2019-02-12 ASSESSMENT — ENCOUNTER SYMPTOMS
CONFUSION: 1
COUGH: 0
HEADACHES: 0
NAUSEA: 0
WEAKNESS: 0
SHORTNESS OF BREATH: 0

## 2019-02-12 ASSESSMENT — MIFFLIN-ST. JEOR: SCORE: 2074.52

## 2019-02-12 NOTE — ED AVS SNAPSHOT
[facility], [place], Emergency Department  [address] [place] [id number]  Phone:  [phone]                                    Jim Willingham   MRN: [id number]    Department:  [facility], Emergency Department   Date of Visit:  2/12/2019           After Visit Summary Signature Page    I have received my discharge instructions, and my questions have been answered. I have discussed any challenges I see with this plan with the nurse or doctor.    ..........................................................................................................................................  Patient/Patient Representative Signature      ..........................................................................................................................................  Patient Representative Print Name and Relationship to Patient    ..................................................               ................................................  Date                                   Time    ..........................................................................................................................................  Reviewed by Signature/Title    ...................................................              ..............................................  Date                                               Time          22EPIC Rev 08/18

## 2019-02-12 NOTE — ED TRIAGE NOTES
LVAD pt c/o HA and disorientation starting at 1145 on 2/11/19.  Pt is also more fatigued than normal.

## 2019-02-12 NOTE — CONSULTS
[facility], [place]      Neurology Stroke Consult    Patient Name: Jim Willingham  : 1955 MRN#: [id number]    STROKE DATA    Stroke Code:  Stroke code not activated.  Time patient seen:  2019 0300  Last known normal (pt's baseline):  19 1030    TPA treatment:  Not given due to outside the time window.     National Institutes of Health Stroke Scale (at presentation)  NIHSS done at:  time patient seen      Score    Level of consciousness:  (0)   Alert, keenly responsive     LOC questions:  (0)   Answers both questions correctly    LOC commands:  (0)   Performs both tasks correctly    Best gaze:  (0)   Normal    Visual:  (0)   No visual loss    Facial palsy:  (0)   Normal symmetrical movements    Motor arm (left):  (0)   No drift    Motor arm (right):  (0)   No drift    Motor leg (left):  (0)   No drift    Motor leg (right):  (0)   No drift    Limb ataxia:  (0)   Absent    Sensory:  (0)   Normal- no sensory loss    Best language:  (0)   Normal- no aphasia    Dysarthria:  (0)   Normal    Extinction and inattention:  (0)   No abnormality        NIHSS Total Score:  0        Dysphagia Screen  Time of screenin2019 0400  Screening results: Passed screening, no dysarthria - Regular Diet with thin liquids     ASSESSMENT & RECOMMENDATIONS     The patient was seen for concern for TIA..  The differential diagnosis includes stroke, TIA, metabolic encephalopathy and fatigue, stress reaction.    63-year-old man with risk factors of paroxysmal A. fib (on warfarin), hypertension, diabetes, who presents after an acute confusional episode.  No associated facial droop, or weakness or numbness in the extremities.  No clear endorsed aphasia.  Blood pressure is low at arrival but he does have the LVAD.  Strictly applying the ABCD 2 score would give a number of 4.  However, as noted below, patient is not thought to have had a TIA given his presenting description of symptoms.  No further  workup from stroke neurology standpoint.    Impression: Not a stroke or TIA.  Transient confusional episode, resolved.    Recommendations:  -No further workup from stroke neurology perspective  -Patient may be discharged from stroke neurology perspective    Prophylaxis          For VTE Prevention:  - per his primary team     The patient will be managed by the ED team and  we will sign off at this time.  Thank you for the consult.  Contact the stroke team if you have any further questions.    HPI  Jim Willingham is a 63 year old male with history of paroxysmal A. fib on warfarin, LVAD, ICD, CHF, CKD, hypertension, diabetes, who presents after an episode of confusion.  History is taken from the patient and available notes.  The following is known:    -Patient awoke in his normal state of health yesterday morning.  He brought his grandchildren to school as he normally does.  He then returned home.    -Shortly after returning home, around 10:30 AM, he says he fell asleep in his car something, something that is quite atypical for him.  He woke up he estimates roughly 30 minutes later and went to enter the house.  He is required to function a number on a keypad and says he could not initially remember the numbers.  This is also quite atypical for him as he says he remembers all sorts of numbers including phone numbers, social security numbers, bank account numbers and other everyday numbers that most other people could not remember.  He became quite concerned about this.     -Family member finally let him in the house.  He was witnessed at this point.  There was no clear endorsed aphasia.  He understood and could respond but was concerned about his inability to remember numbers.  Furthermore, no weakness or numbness in the periphery.  His face was reportedly symmetric.  No other clear change.    -Patient had another short nap after entering the house.  When he woke up, by now early afternoon yesterday, he says he still  "felt a little bit \"off\" and had a mild headache.  No other change.    -Patient denies ever having symptoms like this before.  He denies a history of strokes.  No new medication changes.  He is doing well from the standpoint of his LVAD.    -In the ED, the patient states that he is essentially at baseline minus the headache.  CT of the head showed no clear bleed or other structural pathology to explain his symptoms.    Pertinent Past Medical/Surgical History  Past Medical History:   Diagnosis Date     Benign essential hypertension 5/11/2017     Cardiomyopathy, unspecified (H) 5/8/2017     CKD (chronic kidney disease) stage 3, GFR 30-59 ml/min (H) 5/11/2017     Depression 5/11/2017     Diabetes mellitus (H) 5/11/2017     H/O gastric bypass 5/11/2017     ICD (implantable cardioverter-defibrillator), biventricular, in situ 5/11/2017     LVAD (left ventricular assist device) present (H)      NICM (nonischemic cardiomyopathy) (H)/ EF 20% 5/11/2017    ECHO: LVEDd. 7.66 cm, Restrictive pattern , Severe mitral valve regurgitation     CECILIA (obstructive sleep apnea) 5/11/2017     Paroxysmal atrial fibrillation (H) 5/11/2017     Paroxysmal VT (H) 5/11/2017     Uncomplicated asthma      Vitamin B12 deficiency (non anemic) 5/11/2017       Past Surgical History:   Procedure Laterality Date     GI SURGERY  2003    [facility]     INSERT VENTRICULAR ASSIST DEVICE LEFT (HEARTMATE II) N/A 6/19/2017    Procedure: INSERT VENTRICULAR ASSIST DEVICE LEFT (HEARTMATE II);  Median Sternotomy Heartmate II Left Ventricular Assist Device Insertion on Pump Oxygenator;  Surgeon: Ronnie Quigley MD;  Location: [facility] OR     ORTHOPEDIC SURGERY  1994    right knee wired       Medications:   No current facility-administered medications for this encounter.      Current Outpatient Medications   Medication Sig     acetaminophen (TYLENOL) 325 MG tablet Take 2 tablets (650 mg) by mouth every 6 hours     albuterol (ALBUTEROL) 108 (90 BASE) MCG/ACT Inhaler Inhale 2 " puffs into the lungs every 4 hours as needed for shortness of breath / dyspnea or wheezing     allopurinol (ZYLOPRIM) 100 MG tablet Take 1 tablet (100 mg) by mouth daily     aspirin 81 MG chewable tablet 1 tablet (81 mg) by Oral or Feeding Tube route daily     bumetanide (BUMEX) 1 MG tablet Take 2 tablets (2 mg) by mouth 2 times daily     citalopram (CELEXA) 20 MG tablet Take 20 mg by mouth daily     cyanocobalamin (VITAMIN B12) 1000 MCG/ML injection Inject 1,000 mcg into the muscle every 30 days     fluticasone-vilanterol (BREO ELLIPTA) 200-25 MCG/INH oral inhaler Inhale 1 puff into the lungs daily     gabapentin (NEURONTIN) 300 MG capsule Take 1 capsule (300 mg) by mouth 3 times daily     ipratropium - albuterol 0.5 mg/2.5 mg/3 mL (DUONEB) 0.5-2.5 (3) MG/3ML neb solution Take 3 mLs by nebulization every 4 hours as needed for shortness of breath / dyspnea or wheezing     losartan (COZAAR) 25 MG tablet Take 1.5 tablets (37.5 mg) by mouth daily     metFORMIN (GLUCOPHAGE) 500 MG tablet Take 1 tablet (500 mg) by mouth 2 times daily (with meals)     metoprolol succinate (TOPROL-XL) 25 MG 24 hr tablet Take 50mg in the morning and 25mg in the evening     montelukast (SINGULAIR) 10 MG tablet Take 10 mg by mouth At Bedtime     pantoprazole (PROTONIX) 40 MG EC tablet Take 1 tablet (40 mg) by mouth every morning     potassium chloride (K-TAB,KLOR-CON) 10 MEQ tablet Take 2 tablets (20 mEq) by mouth daily     predniSONE (DELTASONE) 20 MG tablet Take 0.5 tablets (10 mg) by mouth daily     spironolactone (ALDACTONE) 25 MG tablet Take 1 tablet (25 mg) by mouth daily     traMADol (ULTRAM) 50 MG tablet Take 2 tablets (100 mg) by mouth every 6 hours as needed for moderate pain or breakthrough pain     umeclidinium (INCRUSE ELLIPTA) 62.5 MCG/INH oral inhaler Inhale 1 puff into the lungs daily     warfarin (COUMADIN) 3 MG tablet Use as directed.     warfarin (COUMADIN) 5 MG tablet Take 5 - 7.5mg daily or as directed by coumadin clinic.      zinc sulfate (ZINCATE) 220 (50 ZN) MG capsule Take 220 mg by mouth 2 times daily     metolazone (ZAROXOLYN) 2.5 MG tablet Take 1 tablet (2.5 mg) by mouth once for 1 dose   .    Allergies:   Allergies   Allergen Reactions     Ace Inhibitors Unknown     Dust Mites Other (See Comments)     Asthma     Mold Other (See Comments)     Asthma     Penicillins      Sulfa Drugs Unknown and Other (See Comments)     PN: unknown   .    Family History:   Family History   Problem Relation Age of Onset     Cerebrovascular Disease Mother      Diabetes Mother      Hypertension Mother      Coronary Artery Disease Father      Diabetes Type 2  Father    .    Social History:   Social History     Tobacco Use     Smoking status: Former Smoker     Last attempt to quit:      Years since quittin.1     Smokeless tobacco: Never Used   Substance Use Topics     Alcohol use: No   .    Tobacco use: Former smoker, last smoked     ROS:  The 10 point Review of Systems is negative other than noted in the HPI or here.     PHYSICAL EXAMINATION  Vital Signs:  B/P: 84/63[LVAD[,  T: 98.3,  P: 77,  R: 10    General:  patient lying in bed without any acute distress    HEENT:  normocephalic/atraumatic  Cardio:  RRR  Pulmonary:  no respiratory distress  Abdomen:  soft, non-tender  Extremities:  no edema  Skin:  intact, warm/dry     Neurologic  Mental Status:  fully alert, attentive and oriented, follows commands, speech clear and fluent  Cranial Nerves:  visual fields intact, PERRL, EOMI with normal smooth pursuit, facial sensation intact and symmetric, facial movements symmetric, hearing not formally tested but intact to conversation, palate elevation symmetric and uvula midline, no dysarthria, shoulder shrug strong bilaterally, tongue protrusion midline  Motor:  no abnormal movements, normal tone throughout, normal muscle bulk, no pronator drift, normal and symmetric rapid finger tapping, able to move all limbs spontaneously, strength 5/5  throughout upper and lower extremities  Reflexes:  2+ and symmetric throughout, no clonus, toes downgoing  Sensory:  intact/symmetric to light touch and pin prick throughout upper and lower extremities  Coordination:  FNF and HS intact without dysmetria  Station/Gait:  unable to test    Labs  Labs and Imaging reviewed and used in developing the plan; pertinent results included.     Lab Results   Component Value Date     (H) 02/12/2019       The patient was discussed with the Fellow, Dr. Xiomara Byers.  The staff is Dr. Enrique Brand.    Jesika Humphrey MD   Pager: [phone]

## 2019-02-12 NOTE — PROGRESS NOTES
D:  Pt called VAD Coordinator to report he has had a HA most of day and been unusually sleepy.  Also reporting periods of forgetfulness.    I:  Instructed pt to come to [facility] by ambulance as his wife is unable to drive him.  A:  Pt verbalized understanding.  P:  VAD Coordinator available for questions or concerns.

## 2019-02-12 NOTE — PROGRESS NOTES
Called pt to check in after he was in the ED overnight. Pt reports feeling better, doesn't feel foggy or forgetful. He was napping when I called. He has no questions or concerns at this time. Encouraged pt to call on-call coordinator if any symptoms return.

## 2019-02-12 NOTE — ED NOTES
Bed: ED09  Expected date: 2/12/19  Expected time: 12:12 AM  Means of arrival: Ambulance  Comments:  LVAD HM2

## 2019-02-12 NOTE — PROGRESS NOTES
ANTICOAGULATION FOLLOW-UP CLINIC VISIT    Patient Name:  Jim Willingham  Date:  2019  Contact Type:  Telephone    SUBJECTIVE:     Patient Findings     Comments:   Patient was in the ER last night/early morning for confusion.  When at the ER patient was alert and oriented and examined by neurology.  All findings were negative so patient was released.            OBJECTIVE    INR   Date Value Ref Range Status   2019 2.54 (H) 0.86 - 1.14 Final       ASSESSMENT / PLAN  No question data found.  Anticoagulation Summary  As of 2019    INR goal:   2.0-3.0   TTR:   77.1 % (1.6 y)   INR used for dosin.54 (2019)   Warfarin maintenance plan:   10 mg (5 mg x 2) every Mon, Fri; 7.5 mg (5 mg x 1.5) all other days   Full warfarin instructions:   10 mg every Mon, Fri; 7.5 mg all other days   Weekly warfarin total:   57.5 mg   No change documented:   Maru Alonso RN   Plan last modified:   Maru Alonso RN (2019)   Next INR check:   2019   Priority:   INR   Target end date:       Indications    LVAD (left ventricular assist device) present (H) [Z95.811]  Long-term (current) use of anticoagulants [Z79.01] [Z79.01]             Anticoagulation Episode Summary     INR check location:       Preferred lab:       Send INR reminders to:   [facility]    Comments:   HIPPA Forms mailed 17  Labs drawn either at [facility] or [facility]  LVAD placed 17   II  ASA 81 mg daily      Anticoagulation Care Providers     Provider Role Specialty Phone number    Delisa Montgomery MD [facility] Cardiology [phone]            See the Encounter Report to view Anticoagulation Flowsheet and Dosing Calendar (Go to Encounters tab in chart review, and find the Anticoagulation Therapy Visit)    Spoke with patient.     Maru Alonso RN

## 2019-02-12 NOTE — ED PROVIDER NOTES
[facility] EMERGENCY DEPARTMENT ([facility])  February 12, 2019    History     Chief Complaint   Patient presents with     Memory Loss     HPI  Jim Willingham is a 63 year old male with a history of paroxysmal atrial fibrillation (on warfarin),  LVAD, ICD, CHF, CKD, and HTN who presents to the ED with confusion.  Patient states that yesterday at around 10:30 AM he fell asleep in his car and then left his keys in the car.  He states that he has been disoriented and confused throughout the day.  Patient states that he was unable to recall the numbers which he is usually able to do, and this concerned him.  He states that he did not feel like himself, but currently feels improved.  He states that he has been medication compliant with his last INR check at 2.2 last week which is in therapeutic range.  He otherwise currently denies any visual changes, headaches, cough, nausea, shortness of breath, chest pain, or weakness.    PAST MEDICAL HISTORY  Past Medical History:   Diagnosis Date     Benign essential hypertension 5/11/2017     Cardiomyopathy, unspecified (H) 5/8/2017     CKD (chronic kidney disease) stage 3, GFR 30-59 ml/min (H) 5/11/2017     Depression 5/11/2017     Diabetes mellitus (H) 5/11/2017     H/O gastric bypass 5/11/2017     ICD (implantable cardioverter-defibrillator), biventricular, in situ 5/11/2017     LVAD (left ventricular assist device) present (H)      NICM (nonischemic cardiomyopathy) (H)/ EF 20% 5/11/2017    ECHO: LVEDd. 7.66 cm, Restrictive pattern , Severe mitral valve regurgitation     CECILIA (obstructive sleep apnea) 5/11/2017     Paroxysmal atrial fibrillation (H) 5/11/2017     Paroxysmal VT (H) 5/11/2017     Uncomplicated asthma      Vitamin B12 deficiency (non anemic) 5/11/2017     PAST SURGICAL HISTORY  Past Surgical History:   Procedure Laterality Date     GI SURGERY  2003    [facility]     INSERT VENTRICULAR ASSIST DEVICE LEFT (HEARTMATE II) N/A 6/19/2017    Procedure: INSERT  VENTRICULAR ASSIST DEVICE LEFT (HEARTMATE II);  Median Sternotomy Heartmate II Left Ventricular Assist Device Insertion on Pump Oxygenator;  Surgeon: Ronnie Quigley MD;  Location: [facility] OR     ORTHOPEDIC SURGERY      right knee wired     FAMILY HISTORY  Family History   Problem Relation Age of Onset     Cerebrovascular Disease Mother      Diabetes Mother      Hypertension Mother      Coronary Artery Disease Father      Diabetes Type 2  Father      SOCIAL HISTORY  Social History     Tobacco Use     Smoking status: Former Smoker     Last attempt to quit:      Years since quittin.1     Smokeless tobacco: Never Used   Substance Use Topics     Alcohol use: No     MEDICATIONS  No current facility-administered medications for this encounter.      Current Outpatient Medications   Medication     acetaminophen (TYLENOL) 325 MG tablet     albuterol (ALBUTEROL) 108 (90 BASE) MCG/ACT Inhaler     allopurinol (ZYLOPRIM) 100 MG tablet     aspirin 81 MG chewable tablet     bumetanide (BUMEX) 1 MG tablet     citalopram (CELEXA) 20 MG tablet     cyanocobalamin (VITAMIN B12) 1000 MCG/ML injection     fluticasone-vilanterol (BREO ELLIPTA) 200-25 MCG/INH oral inhaler     gabapentin (NEURONTIN) 300 MG capsule     ipratropium - albuterol 0.5 mg/2.5 mg/3 mL (DUONEB) 0.5-2.5 (3) MG/3ML neb solution     losartan (COZAAR) 25 MG tablet     metFORMIN (GLUCOPHAGE) 500 MG tablet     metoprolol succinate (TOPROL-XL) 25 MG 24 hr tablet     montelukast (SINGULAIR) 10 MG tablet     pantoprazole (PROTONIX) 40 MG EC tablet     potassium chloride (K-TAB,KLOR-CON) 10 MEQ tablet     predniSONE (DELTASONE) 20 MG tablet     spironolactone (ALDACTONE) 25 MG tablet     traMADol (ULTRAM) 50 MG tablet     umeclidinium (INCRUSE ELLIPTA) 62.5 MCG/INH oral inhaler     warfarin (COUMADIN) 3 MG tablet     warfarin (COUMADIN) 5 MG tablet     zinc sulfate (ZINCATE) 220 (50 ZN) MG capsule     metolazone (ZAROXOLYN) 2.5 MG tablet     ALLERGIES  Allergies  "  Allergen Reactions     Ace Inhibitors Unknown     Dust Mites Other (See Comments)     Asthma     Mold Other (See Comments)     Asthma     Penicillins      Sulfa Drugs Unknown and Other (See Comments)     PN: unknown       I have reviewed the Medications, Allergies, Past Medical and Surgical History, and Social History in the Epic system.    Review of Systems   Eyes: Negative for visual disturbance.   Respiratory: Negative for cough and shortness of breath.    Cardiovascular: Negative for chest pain.   Gastrointestinal: Negative for nausea.   Neurological: Negative for weakness and headaches.   Psychiatric/Behavioral: Positive for confusion.   All other systems reviewed and are negative.      Physical Exam   BP: (!) 84/63(LVAD)  Pulse: 77  Heart Rate: 78  Temp: 98.3  F (36.8  C)  Resp: 16  Height: 180.3 cm (5' 11\")  Weight: 124.7 kg (275 lb)  SpO2: 96 %      Physical Exam   Constitutional: No distress.   HENT:   Head: Atraumatic.   Mouth/Throat: Oropharynx is clear and moist. No oropharyngeal exudate.   Eyes: Pupils are equal, round, and reactive to light. No scleral icterus.   Cardiovascular:   Typical expected machinery sounds of LVAD function   Pulmonary/Chest: Breath sounds normal. No respiratory distress.   Abdominal: Soft. He exhibits no distension. There is no tenderness.   Musculoskeletal: He exhibits no edema or tenderness.   Neurological: He is alert.   Skin: Skin is warm. He is not diaphoretic.   Psychiatric: He has a normal mood and affect. His behavior is normal.       ED Course        Procedures   1:25 AM  The patient was seen and examined by Dr. Falk in Room 9.              Labs Ordered and Resulted from Time of ED Arrival Up to the Time of Departure from the ED   CBC WITH PLATELETS DIFFERENTIAL - Abnormal; Notable for the following components:       Result Value    RBC Count 4.01 (*)     Hemoglobin 11.6 (*)     Hematocrit 37.7 (*)     MCHC 30.8 (*)     All other components within normal limits "   COMPREHENSIVE METABOLIC PANEL - Abnormal; Notable for the following components:    Carbon Dioxide 16 (*)     Anion Gap 20 (*)     Glucose 219 (*)     Urea Nitrogen 35 (*)     Creatinine 1.47 (*)     GFR Estimate 51 (*)     GFR Estimate If Black 59 (*)     Calcium 8.3 (*)     All other components within normal limits   INR - Abnormal; Notable for the following components:    INR 2.54 (*)     All other components within normal limits   TROPONIN I   UA MACROSCOPIC WITH REFLEX TO MICRO AND CULTURE   PARTIAL THROMBOPLASTIN TIME   PERIPHERAL IV CATHETER           Results for orders placed or performed during the hospital encounter of 02/12/19   CT Head w/o Contrast    Narrative     No acute intracranial findings.   CBC with platelets differential   Result Value Ref Range    WBC 5.8 4.0 - 11.0 10e9/L    RBC Count 4.01 (L) 4.4 - 5.9 10e12/L    Hemoglobin 11.6 (L) 13.3 - 17.7 g/dL    Hematocrit 37.7 (L) 40.0 - 53.0 %    MCV 94 78 - 100 fl    MCH 28.9 26.5 - 33.0 pg    MCHC 30.8 (L) 31.5 - 36.5 g/dL    RDW 14.9 10.0 - 15.0 %    Platelet Count 167 150 - 450 10e9/L    Diff Method Automated Method     % Neutrophils 68.1 %    % Lymphocytes 22.4 %    % Monocytes 7.8 %    % Eosinophils 0.3 %    % Basophils 0.9 %    % Immature Granulocytes 0.5 %    Nucleated RBCs 0 0 /100    Absolute Neutrophil 3.9 1.6 - 8.3 10e9/L    Absolute Lymphocytes 1.3 0.8 - 5.3 10e9/L    Absolute Monocytes 0.5 0.0 - 1.3 10e9/L    Absolute Eosinophils 0.0 0.0 - 0.7 10e9/L    Absolute Basophils 0.1 0.0 - 0.2 10e9/L    Abs Immature Granulocytes 0.0 0 - 0.4 10e9/L    Absolute Nucleated RBC 0.0    Comprehensive metabolic panel   Result Value Ref Range    Sodium 138 133 - 144 mmol/L    Potassium 4.3 3.4 - 5.3 mmol/L    Chloride 102 94 - 109 mmol/L    Carbon Dioxide 16 (L) 20 - 32 mmol/L    Anion Gap 20 (H) 3 - 14 mmol/L    Glucose 219 (H) 70 - 99 mg/dL    Urea Nitrogen 35 (H) 7 - 30 mg/dL    Creatinine 1.47 (H) 0.66 - 1.25 mg/dL    GFR Estimate 51 (L) >60  mL/min/[1.73_m2]    GFR Estimate If Black 59 (L) >60 mL/min/[1.73_m2]    Calcium 8.3 (L) 8.5 - 10.1 mg/dL    Bilirubin Total 0.5 0.2 - 1.3 mg/dL    Albumin 3.6 3.4 - 5.0 g/dL    Protein Total 6.9 6.8 - 8.8 g/dL    Alkaline Phosphatase 98 40 - 150 U/L    ALT 50 0 - 70 U/L    AST 24 0 - 45 U/L   Troponin I   Result Value Ref Range    Troponin I ES 0.017 0.000 - 0.045 ug/L   UA reflex to Microscopic and Culture   Result Value Ref Range    Color Urine Light Yellow     Appearance Urine Clear     Glucose Urine Negative NEG^Negative mg/dL    Bilirubin Urine Negative NEG^Negative    Ketones Urine Negative NEG^Negative mg/dL    Specific Gravity Urine 1.006 1.003 - 1.035    Blood Urine Negative NEG^Negative    pH Urine 6.0 5.0 - 7.0 pH    Protein Albumin Urine Negative NEG^Negative mg/dL    Urobilinogen mg/dL Normal 0.0 - 2.0 mg/dL    Nitrite Urine Negative NEG^Negative    Leukocyte Esterase Urine Negative NEG^Negative    Source Midstream Urine    INR   Result Value Ref Range    INR 2.54 (H) 0.86 - 1.14   Partial thromboplastin time   Result Value Ref Range    PTT 36 22 - 37 sec         Assessments & Plan (with Medical Decision Making)   63-year-old male presents with a chief complaint of intermittent confusion today.  Patient has a history of LVAD placement.  Differential includes but not limited to urinary tract infection, infectious process, TIA, CVA.  Patient's INR is in the appropriate range of 2-3 for LVAD anticoagulation.  There is no evidence of bleeding or other abnormality head CT.  He is afebrile with no leukocytosis or evidence of infection otherwise.  Urine looks clear.  Hemoglobin is 11.6 so I doubt anemia as were causing his symptoms.  Patient did not have any focal neurological signs on exam.  He did not report any weakness or other typical stroke or TIA symptoms.  Discussed with neurology who evaluated the patient in person.  They do not think this likely represents a TIA and from their standpoint they would  be comfortable with discharging the patient to follow-up with his primary care.  Talked with patient in depth about his workup.  At this point he is feeling reassured and would like to be discharged home.  If he develops any new symptoms I recommend he return to us.    I have reviewed the nursing notes.    I have reviewed the findings, diagnosis, plan and need for follow up with the patient.       Medication List      There are no discharge medications for this visit.         Final diagnoses:   Confusion     I, Eliel Benitez, am serving as a trained medical scribe to document services personally performed by  Bayron Falk DO, based on the provider's statements to me.      Bayron LEE DO, was physically present and have reviewed and verified the accuracy of this note documented by Eliel Benitez.     2/12/2019   [facility], [place], EMERGENCY DEPARTMENT     Bayron Falk DO  02/12/19 0413

## 2019-02-12 NOTE — DISCHARGE INSTRUCTIONS
Follow-up with your regular physician.  If you develop any recurrence of your confusion or earlier issues today please return to us for another evaluation.

## 2019-02-19 ENCOUNTER — ANTICOAGULATION THERAPY VISIT (OUTPATIENT)
Dept: ANTICOAGULATION | Facility: CLINIC | Age: 64
End: 2019-02-19

## 2019-02-19 DIAGNOSIS — Z95.811 LVAD (LEFT VENTRICULAR ASSIST DEVICE) PRESENT (H): ICD-10-CM

## 2019-02-19 DIAGNOSIS — Z79.01 LONG TERM CURRENT USE OF ANTICOAGULANT THERAPY: ICD-10-CM

## 2019-02-19 LAB — INR PPP: 2.03 (ref 0.86–1.14)

## 2019-02-19 PROCEDURE — 85610 PROTHROMBIN TIME: CPT | Performed by: INTERNAL MEDICINE

## 2019-02-19 PROCEDURE — 36415 COLL VENOUS BLD VENIPUNCTURE: CPT | Performed by: INTERNAL MEDICINE

## 2019-02-19 NOTE — PROGRESS NOTES
ANTICOAGULATION FOLLOW-UP CLINIC VISIT    Patient Name:  Jim Willingham  Date:  2019  Contact Type:  Telephone    SUBJECTIVE:     Patient Findings     Positives:   No Problem Findings           OBJECTIVE    INR   Date Value Ref Range Status   2019 2.03 (H) 0.86 - 1.14 Final       ASSESSMENT / PLAN  INR assessment THER    Recheck INR In: 1 WEEK    INR Location Clinic      Anticoagulation Summary  As of 2019    INR goal:   2.0-3.0   TTR:   77.4 % (1.6 y)   INR used for dosin.03 (2019)   Warfarin maintenance plan:   10 mg (5 mg x 2) every Mon, Fri; 7.5 mg (5 mg x 1.5) all other days   Full warfarin instructions:   10 mg every Mon, Fri; 7.5 mg all other days   Weekly warfarin total:   57.5 mg   No change documented:   Kirsty Bermudez RN   Plan last modified:   Maru Alonso RN (2019)   Next INR check:   2019   Priority:   INR   Target end date:       Indications    LVAD (left ventricular assist device) present (H) [Z95.811]  Long-term (current) use of anticoagulants [Z79.01] [Z79.01]             Anticoagulation Episode Summary     INR check location:       Preferred lab:       Send INR reminders to:   [facility]    Comments:   HIPPA Forms mailed 17  Labs drawn either at [facility] or [facility]  LVAD placed 17   II  ASA 81 mg daily      Anticoagulation Care Providers     Provider Role Specialty Phone number    Delisa Montgomery MD [facility] Cardiology [phone]            See the Encounter Report to view Anticoagulation Flowsheet and Dosing Calendar (Go to Encounters tab in chart review, and find the Anticoagulation Therapy Visit)    Spoke with Jim.  He reports no changes in health, diet, medications.     Patient had LVAD placed on:   17  Patient's current Aspirin dose: 81 mg daily  LVAD Protocol followed: Yes   If Not Followed Explanation:  RIGOBERTO Bermudez, RN

## 2019-02-20 ENCOUNTER — DOCUMENTATION ONLY (OUTPATIENT)
Dept: CARE COORDINATION | Facility: CLINIC | Age: 64
End: 2019-02-20

## 2019-02-26 ENCOUNTER — ANTICOAGULATION THERAPY VISIT (OUTPATIENT)
Dept: ANTICOAGULATION | Facility: CLINIC | Age: 64
End: 2019-02-26

## 2019-02-26 DIAGNOSIS — Z95.811 LVAD (LEFT VENTRICULAR ASSIST DEVICE) PRESENT (H): ICD-10-CM

## 2019-02-26 DIAGNOSIS — Z79.01 LONG TERM CURRENT USE OF ANTICOAGULANT THERAPY: ICD-10-CM

## 2019-02-26 LAB — INR PPP: 2.98 (ref 0.86–1.14)

## 2019-02-26 PROCEDURE — 36415 COLL VENOUS BLD VENIPUNCTURE: CPT | Performed by: INTERNAL MEDICINE

## 2019-02-26 PROCEDURE — 85610 PROTHROMBIN TIME: CPT | Performed by: INTERNAL MEDICINE

## 2019-02-26 NOTE — PROGRESS NOTES
ANTICOAGULATION FOLLOW-UP CLINIC VISIT    Patient Name:  Jim Willingham  Date:  2019  Contact Type:  Telephone    SUBJECTIVE:     Patient Findings     Positives:   No Problem Findings           OBJECTIVE    INR   Date Value Ref Range Status   2019 2.98 (H) 0.86 - 1.14 Final       ASSESSMENT / PLAN  INR assessment THER    Recheck INR In: 1 WEEK    INR Location Clinic      Anticoagulation Summary  As of 2019    INR goal:   2.0-3.0   TTR:   77.7 % (1.6 y)   INR used for dosin.98 (2019)   Warfarin maintenance plan:   10 mg (5 mg x 2) every Mon, Fri; 7.5 mg (5 mg x 1.5) all other days   Full warfarin instructions:   10 mg every Mon, Fri; 7.5 mg all other days   Weekly warfarin total:   57.5 mg   No change documented:   Kirsty Bermudez, RN   Plan last modified:   Maru Alonso RN (2019)   Next INR check:   3/6/2019   Priority:   INR   Target end date:       Indications    LVAD (left ventricular assist device) present (H) [Z95.811]  Long-term (current) use of anticoagulants [Z79.01] [Z79.01]             Anticoagulation Episode Summary     INR check location:       Preferred lab:       Send INR reminders to:   [facility]    Comments:   HIPPA Forms mailed 17  Labs drawn either at [facility] or [facility]  LVAD placed 17   II  ASA 81 mg daily      Anticoagulation Care Providers     Provider Role Specialty Phone number    Delisa Montgomery MD [facility] Cardiology [phone]            See the Encounter Report to view Anticoagulation Flowsheet and Dosing Calendar (Go to Encounters tab in chart review, and find the Anticoagulation Therapy Visit)    Spoke with Jim.  He reports no changes in health, diet, medications.  He will continue same warfarin dosing and recheck INR in one week.    Patient had LVAD placed on:   17  Patient's current Aspirin dose: 81 mg daily  LVAD Protocol followed: Yes .   If Not Followed Explanation:  RIGOBERTO JOY  Aidan RN

## 2019-03-06 ENCOUNTER — ANTICOAGULATION THERAPY VISIT (OUTPATIENT)
Dept: ANTICOAGULATION | Facility: CLINIC | Age: 64
End: 2019-03-06

## 2019-03-06 DIAGNOSIS — Z95.811 LVAD (LEFT VENTRICULAR ASSIST DEVICE) PRESENT (H): ICD-10-CM

## 2019-03-06 DIAGNOSIS — Z79.01 LONG TERM CURRENT USE OF ANTICOAGULANT THERAPY: ICD-10-CM

## 2019-03-06 LAB — INR PPP: 1.97 (ref 0.86–1.14)

## 2019-03-06 PROCEDURE — 85610 PROTHROMBIN TIME: CPT | Performed by: INTERNAL MEDICINE

## 2019-03-06 PROCEDURE — 36415 COLL VENOUS BLD VENIPUNCTURE: CPT | Performed by: INTERNAL MEDICINE

## 2019-03-06 NOTE — PROGRESS NOTES
ANTICOAGULATION FOLLOW-UP CLINIC VISIT    Patient Name:  Jim Willingham  Date:  3/6/2019  Contact Type:  Telephone    SUBJECTIVE:     Patient Findings     Positives:   No Problem Findings    Comments:   Patient will increase ASA 325mg daily until INR back in goal range.            OBJECTIVE    INR   Date Value Ref Range Status   2019 1.97 (H) 0.86 - 1.14 Final       ASSESSMENT / PLAN  No question data found.  Anticoagulation Summary  As of 3/6/2019    INR goal:   2.0-3.0   TTR:   78.0 % (1.7 y)   INR used for dosin.97! (3/6/2019)   Warfarin maintenance plan:   10 mg (5 mg x 2) every Mon, Fri; 7.5 mg (5 mg x 1.5) all other days   Full warfarin instructions:   3/6: 10 mg; 3/7: 10 mg; Otherwise 10 mg every Mon, Fri; 7.5 mg all other days   Weekly warfarin total:   57.5 mg   Plan last modified:   Maru Alonso RN (2019)   Next INR check:   3/8/2019   Priority:   INR   Target end date:       Indications    LVAD (left ventricular assist device) present (H) [Z95.811]  Long-term (current) use of anticoagulants [Z79.01] [Z79.01]             Anticoagulation Episode Summary     INR check location:       Preferred lab:       Send INR reminders to:   [facility]    Comments:   HIPPA Forms mailed 17  Labs drawn either at [facility] or [facility]  LVAD placed 17   II  ASA 81 mg daily      Anticoagulation Care Providers     Provider Role Specialty Phone number    Delisa Montgomery MD [facility] Cardiology [phone]            See the Encounter Report to view Anticoagulation Flowsheet and Dosing Calendar (Go to Encounters tab in chart review, and find the Anticoagulation Therapy Visit)    Spoke with patient.     Maru Alonso RN

## 2019-03-08 ENCOUNTER — ANTICOAGULATION THERAPY VISIT (OUTPATIENT)
Dept: ANTICOAGULATION | Facility: CLINIC | Age: 64
End: 2019-03-08

## 2019-03-08 DIAGNOSIS — Z95.811 LVAD (LEFT VENTRICULAR ASSIST DEVICE) PRESENT (H): ICD-10-CM

## 2019-03-08 DIAGNOSIS — Z79.01 LONG TERM CURRENT USE OF ANTICOAGULANT THERAPY: ICD-10-CM

## 2019-03-08 LAB — INR PPP: 2.44 (ref 0.86–1.14)

## 2019-03-08 PROCEDURE — 36415 COLL VENOUS BLD VENIPUNCTURE: CPT | Performed by: INTERNAL MEDICINE

## 2019-03-08 PROCEDURE — 85610 PROTHROMBIN TIME: CPT | Performed by: INTERNAL MEDICINE

## 2019-03-08 NOTE — PROGRESS NOTES
ANTICOAGULATION FOLLOW-UP CLINIC VISIT    Patient Name:  Jim Willingham  Date:  3/8/2019  Contact Type:  Telephone    SUBJECTIVE:     Patient Findings     Comments:   Jim reports no changes in medications, health, diet.  He will go back to ASA 81 mg daily and back to his maintenance dose of warfarin.  He will recheck INR in one week.             OBJECTIVE    INR   Date Value Ref Range Status   2019 2.44 (H) 0.86 - 1.14 Final       ASSESSMENT / PLAN  INR assessment THER    Recheck INR In: 1 WEEK    INR Location Clinic      Anticoagulation Summary  As of 3/8/2019    INR goal:   2.0-3.0   TTR:   78.0 % (1.7 y)   INR used for dosin.44 (3/8/2019)   Warfarin maintenance plan:   10 mg (5 mg x 2) every Mon, Fri; 7.5 mg (5 mg x 1.5) all other days   Full warfarin instructions:   10 mg every Mon, Fri; 7.5 mg all other days   Weekly warfarin total:   57.5 mg   No change documented:   Kirsty Bermudez RN   Plan last modified:   Maru Alonso RN (2019)   Next INR check:   3/15/2019   Priority:   INR   Target end date:       Indications    LVAD (left ventricular assist device) present (H) [Z95.811]  Long-term (current) use of anticoagulants [Z79.01] [Z79.01]             Anticoagulation Episode Summary     INR check location:       Preferred lab:       Send INR reminders to:   [facility]    Comments:   HIPPA Forms mailed 17  Labs drawn either at [facility] or [facility]  LVAD placed 17   II  ASA 81 mg daily      Anticoagulation Care Providers     Provider Role Specialty Phone number    Delisa Montgomery MD Responsible Cardiology [phone]            See the Encounter Report to view Anticoagulation Flowsheet and Dosing Calendar (Go to Encounters tab in chart review, and find the Anticoagulation Therapy Visit)    Spoke with iJm. He reports no changes in medications, health, diet.  He will go back to ASA 81 mg daily and back to his maintenance dose of warfarin.  He  will recheck INR in one week.      Patient had LVAD placed on:   6/19/17  Patient's current Aspirin dose: will go back to 81 mg daily  LVAD Protocol followed: Yes   If Not Followed Explanation:  RIGOBERTO Bermudez RN

## 2019-03-15 NOTE — PROGRESS NOTES
Addendum 3/15/19 Pt called reporting he is unable to come in for an INR due to not feeling good. Pt has an appointment on 3/19 and will make sure to get an INR drawn on that date. Pt also reports he is on a 10 day course of Cefdinir and in the past when pt was on this antibiotic it didn't effect him. No changes made to pt's Warfarin and updated calendar to reflect changed appointment. Radha Pedro RN

## 2019-03-18 DIAGNOSIS — I50.22 CHRONIC SYSTOLIC CONGESTIVE HEART FAILURE (H): ICD-10-CM

## 2019-03-18 DIAGNOSIS — Z95.811 LVAD (LEFT VENTRICULAR ASSIST DEVICE) PRESENT (H): Primary | ICD-10-CM

## 2019-03-19 ENCOUNTER — ANCILLARY PROCEDURE (OUTPATIENT)
Dept: CARDIOLOGY | Facility: CLINIC | Age: 64
End: 2019-03-19
Payer: COMMERCIAL

## 2019-03-19 ENCOUNTER — OFFICE VISIT (OUTPATIENT)
Dept: CARDIOLOGY | Facility: CLINIC | Age: 64
End: 2019-03-19
Attending: INTERNAL MEDICINE
Payer: COMMERCIAL

## 2019-03-19 ENCOUNTER — ANTICOAGULATION THERAPY VISIT (OUTPATIENT)
Dept: ANTICOAGULATION | Facility: CLINIC | Age: 64
End: 2019-03-19

## 2019-03-19 VITALS
SYSTOLIC BLOOD PRESSURE: 78 MMHG | WEIGHT: 270.6 LBS | TEMPERATURE: 98.4 F | HEART RATE: 64 BPM | HEIGHT: 68 IN | BODY MASS INDEX: 41.01 KG/M2 | OXYGEN SATURATION: 97 %

## 2019-03-19 DIAGNOSIS — J40 BRONCHITIS: ICD-10-CM

## 2019-03-19 DIAGNOSIS — Z95.811 LVAD (LEFT VENTRICULAR ASSIST DEVICE) PRESENT (H): ICD-10-CM

## 2019-03-19 DIAGNOSIS — I50.22 CHRONIC SYSTOLIC CONGESTIVE HEART FAILURE (H): ICD-10-CM

## 2019-03-19 DIAGNOSIS — I42.9 CARDIOMYOPATHY (H): ICD-10-CM

## 2019-03-19 DIAGNOSIS — Z95.811 LVAD (LEFT VENTRICULAR ASSIST DEVICE) PRESENT (H): Primary | ICD-10-CM

## 2019-03-19 DIAGNOSIS — Z79.01 LONG TERM CURRENT USE OF ANTICOAGULANT THERAPY: ICD-10-CM

## 2019-03-19 LAB
ALBUMIN SERPL-MCNC: 3.8 G/DL (ref 3.4–5)
ALP SERPL-CCNC: 107 U/L (ref 40–150)
ALT SERPL W P-5'-P-CCNC: 52 U/L (ref 0–70)
ANION GAP SERPL CALCULATED.3IONS-SCNC: 7 MMOL/L (ref 3–14)
AST SERPL W P-5'-P-CCNC: 31 U/L (ref 0–45)
BILIRUB SERPL-MCNC: 0.5 MG/DL (ref 0.2–1.3)
BUN SERPL-MCNC: 32 MG/DL (ref 7–30)
CALCIUM SERPL-MCNC: 9 MG/DL (ref 8.5–10.1)
CHLORIDE SERPL-SCNC: 100 MMOL/L (ref 94–109)
CO2 SERPL-SCNC: 29 MMOL/L (ref 20–32)
CREAT SERPL-MCNC: 1.32 MG/DL (ref 0.66–1.25)
ERYTHROCYTE [DISTWIDTH] IN BLOOD BY AUTOMATED COUNT: 14.6 % (ref 10–15)
GFR SERPL CREATININE-BSD FRML MDRD: 58 ML/MIN/{1.73_M2}
GLUCOSE SERPL-MCNC: 99 MG/DL (ref 70–99)
HCT VFR BLD AUTO: 42 % (ref 40–53)
HGB BLD-MCNC: 12.3 G/DL (ref 13.3–17.7)
INR PPP: 2.63 (ref 0.86–1.14)
LDH SERPL L TO P-CCNC: 321 U/L (ref 85–227)
MCH RBC QN AUTO: 26.7 PG (ref 26.5–33)
MCHC RBC AUTO-ENTMCNC: 29.3 G/DL (ref 31.5–36.5)
MCV RBC AUTO: 91 FL (ref 78–100)
PLATELET # BLD AUTO: 200 10E9/L (ref 150–450)
POTASSIUM SERPL-SCNC: 4.2 MMOL/L (ref 3.4–5.3)
PROT SERPL-MCNC: 7.3 G/DL (ref 6.8–8.8)
RBC # BLD AUTO: 4.61 10E12/L (ref 4.4–5.9)
SODIUM SERPL-SCNC: 136 MMOL/L (ref 133–144)
WBC # BLD AUTO: 6.7 10E9/L (ref 4–11)

## 2019-03-19 PROCEDURE — 85027 COMPLETE CBC AUTOMATED: CPT | Performed by: INTERNAL MEDICINE

## 2019-03-19 PROCEDURE — 83615 LACTATE (LD) (LDH) ENZYME: CPT | Performed by: INTERNAL MEDICINE

## 2019-03-19 PROCEDURE — 80053 COMPREHEN METABOLIC PANEL: CPT | Performed by: INTERNAL MEDICINE

## 2019-03-19 PROCEDURE — 85610 PROTHROMBIN TIME: CPT | Performed by: INTERNAL MEDICINE

## 2019-03-19 PROCEDURE — 99214 OFFICE O/P EST MOD 30 MIN: CPT | Mod: 25 | Performed by: NURSE PRACTITIONER

## 2019-03-19 PROCEDURE — 36415 COLL VENOUS BLD VENIPUNCTURE: CPT | Performed by: INTERNAL MEDICINE

## 2019-03-19 PROCEDURE — G0463 HOSPITAL OUTPT CLINIC VISIT: HCPCS | Mod: 25,ZF

## 2019-03-19 PROCEDURE — 93750 INTERROGATION VAD IN PERSON: CPT | Mod: ZF | Performed by: NURSE PRACTITIONER

## 2019-03-19 RX ORDER — GUAIFENESIN AND DEXTROMETHORPHAN HYDROBROMIDE 600; 30 MG/1; MG/1
1 TABLET, EXTENDED RELEASE ORAL EVERY 12 HOURS
COMMUNITY
End: 2019-07-24

## 2019-03-19 RX ORDER — CEFDINIR 300 MG/1
300 CAPSULE ORAL 2 TIMES DAILY
COMMUNITY
End: 2019-07-24

## 2019-03-19 RX ORDER — PREDNISONE 20 MG/1
20 TABLET ORAL 2 TIMES DAILY
Qty: 4 TABLET | Refills: 0 | Status: SHIPPED | OUTPATIENT
Start: 2019-03-19 | End: 2019-07-24

## 2019-03-19 RX ORDER — LIDOCAINE 40 MG/G
CREAM TOPICAL
Status: CANCELLED | OUTPATIENT
Start: 2019-03-19

## 2019-03-19 ASSESSMENT — PAIN SCALES - GENERAL: PAINLEVEL: NO PAIN (0)

## 2019-03-19 ASSESSMENT — MIFFLIN-ST. JEOR: SCORE: 2006.93

## 2019-03-19 NOTE — PROGRESS NOTES
ANTICOAGULATION FOLLOW-UP CLINIC VISIT    Patient Name:  Jim Willingham  Date:  3/19/2019  Contact Type:  Telephone    SUBJECTIVE:        OBJECTIVE    INR   Date Value Ref Range Status   2019 2.63 (H) 0.86 - 1.14 Final       ASSESSMENT / PLAN  INR assessment THER    Recheck INR In: 1 WEEK    INR Location Clinic      Anticoagulation Summary  As of 3/19/2019    INR goal:   2.0-3.0   TTR:   78.4 % (1.7 y)   INR used for dosin.63 (3/19/2019)   Warfarin maintenance plan:   10 mg (5 mg x 2) every Mon, Fri; 7.5 mg (5 mg x 1.5) all other days   Full warfarin instructions:   10 mg every Mon, Fri; 7.5 mg all other days   Weekly warfarin total:   57.5 mg   No change documented:   Kirsty Bermudez RN   Plan last modified:   Maru Alonso RN (2019)   Next INR check:   3/26/2019   Priority:   INR   Target end date:       Indications    LVAD (left ventricular assist device) present (H) [Z95.811]  Long-term (current) use of anticoagulants [Z79.01] [Z79.01]             Anticoagulation Episode Summary     INR check location:       Preferred lab:       Send INR reminders to:   [facility]    Comments:   HIPPA Forms mailed 17  Labs drawn either at [facility] or [facility]  LVAD placed 17   II  ASA 81 mg daily      Anticoagulation Care Providers     Provider Role Specialty Phone number    Delisa Montgomery MD [facility] Cardiology [phone]            See the Encounter Report to view Anticoagulation Flowsheet and Dosing Calendar (Go to Encounters tab in chart review, and find the Anticoagulation Therapy Visit)    Left message for patient with results and dosing recommendations. Asked patient to call back to report any missed doses, falls, signs and symptoms of bleeding or clotting, any changes in health, medication, or diet. Asked patient to call back with any questions or concerns.    Patient had LVAD placed on:   17  Patient's current Aspirin dose: 81 mg daily  LVAD  Protocol followed: Yes    If Not Followed Explanation:  cam Bermudez RN    3/26/19 ADDENDUM  Patient did not have their labs done today. I left a  message for the  patient to get their labs done as soon as possible and call the Anticoagulation Clinic.  Brendan Montoya RN

## 2019-03-19 NOTE — LETTER
RE: Jim Willingham  [address] [place] [address]       Dear Colleague,    Thank you for the opportunity to participate in the care of your patient, Jim Willingham, at the [facility] HEART [facility]. Please see a copy of my visit note below.    Advanced Heart Failure Clinic -- LVAD Follow Up  March 19, 2019    HPI:   Mr. Jim Willingham is a 63yr old male with a history of NICM (LVEF 10-20%) s/p HMII LVAD (6/19/17, c/b arrhythmias, WALTER, and left pleural effusion), RV dysfunction, CKD, DMII, CECILIA, obesity, COPD, asthma, and HTN who presents to clinic for follow up.    Since his last visit, he states that he has felt well overall.  He recently developed bronchitis, and has improved since starting steroids and cefdinir.  His breathing overall has remained stable, and he continues to note RESENDIZ which has not changed.  He wears CPAP, but notes that his machine is not working properly.  His weight is down ~6#, and was 259# per home scale today.  He remains on bumex 2mg twice daily, with no fluid retention, abdominal distension, or LE edema.  He has been trying to monitor his diet, and limit salt and fluid intake.  He has been somewhat active with his granddaughter, but recognizes that he has not been doing regular aerobic exercise.  He otherwise denies chest pain, palpitations, worsening SOB, PND, orthopnea, dizziness, syncope, headaches, acute vision changes, fevers, chills, sore throat, nausea, vomiting, diarrhea, constipation, signs of bleeding, and driveline concerns.    PAST MEDICAL HISTORY:  Past Medical History:   Diagnosis Date     Benign essential hypertension 5/11/2017     Cardiomyopathy, unspecified (H) 5/8/2017     CKD (chronic kidney disease) stage 3, GFR 30-59 ml/min (H) 5/11/2017     Depression 5/11/2017     Diabetes mellitus (H) 5/11/2017     H/O gastric bypass 5/11/2017     ICD (implantable cardioverter-defibrillator), biventricular, in situ 5/11/2017      LVAD (left ventricular assist device) present (H)      NICM (nonischemic cardiomyopathy) (H)/ EF 20% 2017    ECHO: LVEDd. 7.66 cm, Restrictive pattern , Severe mitral valve regurgitation     CECILIA (obstructive sleep apnea) 2017     Paroxysmal atrial fibrillation (H) 2017     Paroxysmal VT (H) 2017     Uncomplicated asthma      Vitamin B12 deficiency (non anemic) 2017       FAMILY HISTORY:  Family History   Problem Relation Age of Onset     Cerebrovascular Disease Mother      Diabetes Mother      Hypertension Mother      Coronary Artery Disease Father      Diabetes Type 2  Father        SOCIAL HISTORY:  Social History     Socioeconomic History     Marital status:      Spouse name: None     Number of children: None     Years of education: None     Highest education level: None   Occupational History     None   Social Needs     Financial resource strain: None     Food insecurity:     Worry: None     Inability: None     Transportation needs:     Medical: None     Non-medical: None   Tobacco Use     Smoking status: Former Smoker     Last attempt to quit:      Years since quittin.2     Smokeless tobacco: Never Used   Substance and Sexual Activity     Alcohol use: No     Drug use: No     Sexual activity: Yes     Partners: Female   Lifestyle     Physical activity:     Days per week: None     Minutes per session: None     Stress: None   Relationships     Social connections:     Talks on phone: None     Gets together: None     Attends [employer] service: None     Active member of club or organization: None     Attends meetings of clubs or organizations: None     Relationship status: None     Intimate partner violence:     Fear of current or ex partner: None     Emotionally abused: None     Physically abused: None     Forced sexual activity: None   Other Topics Concern     Parent/sibling w/ CABG, MI or angioplasty before 65F 55M? No   Social History Narrative     None       CURRENT  MEDICATIONS:  Current Outpatient Medications   Medication Sig Dispense Refill     acetaminophen (TYLENOL) 325 MG tablet Take 2 tablets (650 mg) by mouth every 6 hours 100 tablet      albuterol (ALBUTEROL) 108 (90 BASE) MCG/ACT Inhaler Inhale 2 puffs into the lungs every 4 hours as needed for shortness of breath / dyspnea or wheezing       allopurinol (ZYLOPRIM) 100 MG tablet Take 1 tablet (100 mg) by mouth daily 60 tablet 5     aspirin 81 MG chewable tablet 1 tablet (81 mg) by Oral or Feeding Tube route daily 36 tablet      bumetanide (BUMEX) 1 MG tablet Take 2 tablets (2 mg) by mouth 2 times daily 120 tablet 11     citalopram (CELEXA) 20 MG tablet Take 20 mg by mouth daily       cyanocobalamin (VITAMIN B12) 1000 MCG/ML injection Inject 1,000 mcg into the muscle every 30 days       fluticasone-vilanterol (BREO ELLIPTA) 200-25 MCG/INH oral inhaler Inhale 1 puff into the lungs daily       gabapentin (NEURONTIN) 300 MG capsule Take 1 capsule (300 mg) by mouth 3 times daily 90 capsule      ipratropium - albuterol 0.5 mg/2.5 mg/3 mL (DUONEB) 0.5-2.5 (3) MG/3ML neb solution Take 3 mLs by nebulization every 4 hours as needed for shortness of breath / dyspnea or wheezing       losartan (COZAAR) 25 MG tablet Take 1.5 tablets (37.5 mg) by mouth daily 90 tablet 1     metFORMIN (GLUCOPHAGE) 500 MG tablet Take 1 tablet (500 mg) by mouth 2 times daily (with meals) 60 tablet 0     metolazone (ZAROXOLYN) 2.5 MG tablet Take 1 tablet (2.5 mg) by mouth once for 1 dose 1 tablet 0     metoprolol succinate (TOPROL-XL) 25 MG 24 hr tablet Take 50mg in the morning and 25mg in the evening 180 tablet 5     montelukast (SINGULAIR) 10 MG tablet Take 10 mg by mouth At Bedtime       pantoprazole (PROTONIX) 40 MG EC tablet Take 1 tablet (40 mg) by mouth every morning 60 tablet 5     potassium chloride (K-TAB,KLOR-CON) 10 MEQ tablet Take 2 tablets (20 mEq) by mouth daily 120 tablet 5     predniSONE (DELTASONE) 20 MG tablet Take 0.5 tablets (10 mg)  "by mouth daily 20 tablet 0     spironolactone (ALDACTONE) 25 MG tablet Take 1 tablet (25 mg) by mouth daily 30 tablet 5     traMADol (ULTRAM) 50 MG tablet Take 2 tablets (100 mg) by mouth every 6 hours as needed for moderate pain or breakthrough pain 28 tablet      umeclidinium (INCRUSE ELLIPTA) 62.5 MCG/INH oral inhaler Inhale 1 puff into the lungs daily       warfarin (COUMADIN) 3 MG tablet Use as directed. 30 tablet 0     warfarin (COUMADIN) 5 MG tablet Take 5 - 7.5mg daily or as directed by coumadin clinic. 90 tablet 5     zinc sulfate (ZINCATE) 220 (50 ZN) MG capsule Take 220 mg by mouth 2 times daily       EXAM:  BP (!) 78/0 (BP Location: Right arm, Patient Position: Chair, Cuff Size: Adult Large)   Pulse 64   Temp 98.4  F (36.9  C) (Oral)   Ht 1.727 m (5' 8\")   Wt 122.7 kg (270 lb 9.6 oz)   SpO2 97%   BMI 41.14 kg/m     General: appears comfortable, alert and articulate  Head: normocephalic, atraumatic  Eyes: anicteric sclera, EOMI  Neck: no adenopathy  Orophyarynx: moist mucosa, no lesions, dentition intact  Heart: mechanical LVAD hum, JVD negative when sitting upright.  Lungs: expiratory wheezing, otherwise clear with equal aeration  Abdomen: soft, non-tender, bowel sounds present, no hepatosplenomegaly  Extremities: no clubbing, cyanosis or LE edema  Neurological: normal speech and affect, no gross motor deficits  Integument: No open lesions, rashes, or jaundice. Driveline site covered, dressing c/d/i.    LVAD interrogation reviewed with LVAD coordinator, agree with findings.  2 \"low voltage advisory\" alarms noted.  No other signs of pump malfunction.    Labs:  CBC RESULTS:  Lab Results   Component Value Date    WBC 6.7 03/19/2019    RBC 4.61 03/19/2019    HGB 12.3 (L) 03/19/2019    HCT 42.0 03/19/2019    MCV 91 03/19/2019    MCH 26.7 03/19/2019    MCHC 29.3 (L) 03/19/2019    RDW 14.6 03/19/2019     03/19/2019       CMP RESULTS:  Lab Results   Component Value Date     03/19/2019    " POTASSIUM 4.2 03/19/2019    CHLORIDE 100 03/19/2019    CO2 29 03/19/2019    ANIONGAP 7 03/19/2019    GLC 99 03/19/2019    BUN 32 (H) 03/19/2019    CR 1.32 (H) 03/19/2019    GFRESTIMATED 58 (L) 03/19/2019    GFRESTBLACK 67 03/19/2019    QAMAR 9.0 03/19/2019    BILITOTAL PENDING 03/19/2019    ALBUMIN PENDING 03/19/2019    ALKPHOS PENDING 03/19/2019    ALT PENDING 03/19/2019    AST PENDING 03/19/2019        INR RESULTS:  Lab Results   Component Value Date    INR 2.63 (H) 03/19/2019       Lab Results   Component Value Date    MAG 2.1 06/30/2017     Lab Results   Component Value Date    NTBNPI 4,216 (H) 06/15/2017     Lab Results   Component Value Date    NTBNP 1,351 (H) 07/06/2018     Assessment and Plan:   Mr. Jim Willingham is a 63yr old male with a history of NICM (LVEF 10-20%) s/p HMII LVAD (6/19/17, c/b arrhythmias, WALTER, and left pleural effusion), RV dysfunction, CKD, DMII, CECILIA, obesity, COPD, asthma, and HTN who presents to clinic for follow up.    Labs today show stable electrolytes, kidney function, liver function, and blood counts.  INR 2.63.    Mr. Willingham remains euvolemic on bumex 2mg twice daily.  His MAPs today are at goal.  Advised that he continue current meds, with no changes, and to follow up with Dr. Montgomery as scheduled in June.    Chronic systolic heart failure secondary to NICM  Moderate RV dysfunction  Stage D  NYHA Class III  ACEi/ARB:  Yes, losartan 37.5mg daily  BB:  Yes, metoprolol XL 50mg in the morning and 25mg in the evening  Aldosterone antagonist:  Yes, spironolactone 25mg daily  SCD prophylaxis:  CRT-d  Fluid status euvolemic, continue bumex 2mg BID  Follow-up:  RTC to see Dr. Montgomery in June 2019    S/p HMII LVAD (6/19/17)  Antiplatelet:  Aspirin 81mg daily  Anticoagulation:  Warfarin, dosed per INR clinic with goal INR 2-3.  INR today 2.63.  MAP:   78 today in clinic  LDH:  32 1, stable    Bronchitis  Reports that his breathing has improved since starting prednisone and cefdinir.  He will  complete his 5-d course of prednisone today, and feels like he could benefit from a few additional doses.  Will give him 2 more days of prednisone (20mg po BID x 2 days), and asked that he follow up with PCP if his symptoms do not improve after additional prednisone and cefdinir.    CECILIA  Discussed sleep medicine referral for repeat sleep study (as it has been >10 yrs and his machine is not working properly).  He is receptive, and will review with his pulmonologist.      Other:  VT/VF:  On BB, as noted above.  pAF:  Remains on BB and anticoagulation.  HR regular today.  HTN:  MAPs at goal.  Continues on BB and losartan.  Chronic pain:  On Tramadol.  CKD:  Creatinine remains stable.  MICHAEL:   Hgb improved since IV Iron infusions.  Obesity, with BMI > 35:  Encouraged continued dietary monitoring as well as increased aerobic activity to reach weight loss goals.        The above was reviewed with  Willingham, who verbalized understanding and will call with further questions/concerns.     30 minutes spent face-to-face with patient, with >50% in counseling and/or coordination of care as described above.        Shelia Means, DNP, APRN, FNP-BC  Advanced Heart Failure Nurse Practitioner  [facility]  Patient Care Team:  Jovany Salas MD as PCP - General (Internal Medicine)  Elfego Hayden MD as MD (Internal Medicine)  Catalina Montgomery MD as Referring Physician (Cardiology)  Rajani Abdi, RN as Registered Nurse  CATALINA MONTGOMERY

## 2019-03-19 NOTE — NURSING NOTE
Chief Complaint   Patient presents with     Follow Up     3 month VAD return, lab and device prior     Vitals were taken and medications were reconciled.     April López CMA    10:03 AM

## 2019-03-19 NOTE — PROGRESS NOTES
Advanced [facility] Failure Clinic -- LVAD Follow Up  March 19, 2019    HPI:   Mr. Jim Willingham is a 63yr old male with a history of NICM (LVEF 10-20%) s/p HMII LVAD (6/19/17, c/b arrhythmias, WALTER, and left pleural effusion), RV dysfunction, CKD, DMII, CECILIA, obesity, COPD, asthma, and HTN who presents to clinic for follow up.    Since his last visit, he states that he has felt well overall.  He recently developed bronchitis, and has improved since starting steroids and cefdinir.  His breathing overall has remained stable, and he continues to note RESENDIZ which has not changed.  He wears CPAP, but notes that his machine is not working properly.  His weight is down ~6#, and was 259# per home scale today.  He remains on bumex 2mg twice daily, with no fluid retention, abdominal distension, or LE edema.  He has been trying to monitor his diet, and limit salt and fluid intake.  He has been somewhat active with his granddaughter, but recognizes that he has not been doing regular aerobic exercise.  He otherwise denies chest pain, palpitations, worsening SOB, PND, orthopnea, dizziness, syncope, headaches, acute vision changes, fevers, chills, sore throat, nausea, vomiting, diarrhea, constipation, signs of bleeding, and driveline concerns.    PAST MEDICAL HISTORY:  Past Medical History:   Diagnosis Date     Benign essential hypertension 5/11/2017     Cardiomyopathy, unspecified (H) 5/8/2017     CKD (chronic kidney disease) stage 3, GFR 30-59 ml/min (H) 5/11/2017     Depression 5/11/2017     Diabetes mellitus (H) 5/11/2017     H/O gastric bypass 5/11/2017     ICD (implantable cardioverter-defibrillator), biventricular, in situ 5/11/2017     LVAD (left ventricular assist device) present (H)      NICM (nonischemic cardiomyopathy) (H)/ EF 20% 5/11/2017    ECHO: LVEDd. 7.66 cm, Restrictive pattern , Severe mitral valve regurgitation     CECILIA (obstructive sleep apnea) 5/11/2017     Paroxysmal atrial fibrillation (H) 5/11/2017     Paroxysmal  VT (H) 2017     Uncomplicated asthma      Vitamin B12 deficiency (non anemic) 2017       FAMILY HISTORY:  Family History   Problem Relation Age of Onset     Cerebrovascular Disease Mother      Diabetes Mother      Hypertension Mother      Coronary Artery Disease Father      Diabetes Type 2  Father        SOCIAL HISTORY:  Social History     Socioeconomic History     Marital status:      Spouse name: None     Number of children: None     Years of education: None     Highest education level: None   Occupational History     None   Social Needs     Financial resource strain: None     Food insecurity:     Worry: None     Inability: None     Transportation needs:     Medical: None     Non-medical: None   Tobacco Use     Smoking status: Former Smoker     Last attempt to quit:      Years since quittin.2     Smokeless tobacco: Never Used   Substance and Sexual Activity     Alcohol use: No     Drug use: No     Sexual activity: Yes     Partners: Female   Lifestyle     Physical activity:     Days per week: None     Minutes per session: None     Stress: None   Relationships     Social connections:     Talks on phone: None     Gets together: None     Attends [employer] service: None     Active member of club or organization: None     Attends meetings of clubs or organizations: None     Relationship status: None     Intimate partner violence:     Fear of current or ex partner: None     Emotionally abused: None     Physically abused: None     Forced sexual activity: None   Other Topics Concern     Parent/sibling w/ CABG, MI or angioplasty before 65F 55M? No   Social History Narrative     None       CURRENT MEDICATIONS:  Current Outpatient Medications   Medication Sig Dispense Refill     acetaminophen (TYLENOL) 325 MG tablet Take 2 tablets (650 mg) by mouth every 6 hours 100 tablet      albuterol (ALBUTEROL) 108 (90 BASE) MCG/ACT Inhaler Inhale 2 puffs into the lungs every 4 hours as needed for shortness of  breath / dyspnea or wheezing       allopurinol (ZYLOPRIM) 100 MG tablet Take 1 tablet (100 mg) by mouth daily 60 tablet 5     aspirin 81 MG chewable tablet 1 tablet (81 mg) by Oral or Feeding Tube route daily 36 tablet      bumetanide (BUMEX) 1 MG tablet Take 2 tablets (2 mg) by mouth 2 times daily 120 tablet 11     citalopram (CELEXA) 20 MG tablet Take 20 mg by mouth daily       cyanocobalamin (VITAMIN B12) 1000 MCG/ML injection Inject 1,000 mcg into the muscle every 30 days       fluticasone-vilanterol (BREO ELLIPTA) 200-25 MCG/INH oral inhaler Inhale 1 puff into the lungs daily       gabapentin (NEURONTIN) 300 MG capsule Take 1 capsule (300 mg) by mouth 3 times daily 90 capsule      ipratropium - albuterol 0.5 mg/2.5 mg/3 mL (DUONEB) 0.5-2.5 (3) MG/3ML neb solution Take 3 mLs by nebulization every 4 hours as needed for shortness of breath / dyspnea or wheezing       losartan (COZAAR) 25 MG tablet Take 1.5 tablets (37.5 mg) by mouth daily 90 tablet 1     metFORMIN (GLUCOPHAGE) 500 MG tablet Take 1 tablet (500 mg) by mouth 2 times daily (with meals) 60 tablet 0     metolazone (ZAROXOLYN) 2.5 MG tablet Take 1 tablet (2.5 mg) by mouth once for 1 dose 1 tablet 0     metoprolol succinate (TOPROL-XL) 25 MG 24 hr tablet Take 50mg in the morning and 25mg in the evening 180 tablet 5     montelukast (SINGULAIR) 10 MG tablet Take 10 mg by mouth At Bedtime       pantoprazole (PROTONIX) 40 MG EC tablet Take 1 tablet (40 mg) by mouth every morning 60 tablet 5     potassium chloride (K-TAB,KLOR-CON) 10 MEQ tablet Take 2 tablets (20 mEq) by mouth daily 120 tablet 5     predniSONE (DELTASONE) 20 MG tablet Take 0.5 tablets (10 mg) by mouth daily 20 tablet 0     spironolactone (ALDACTONE) 25 MG tablet Take 1 tablet (25 mg) by mouth daily 30 tablet 5     traMADol (ULTRAM) 50 MG tablet Take 2 tablets (100 mg) by mouth every 6 hours as needed for moderate pain or breakthrough pain 28 tablet      umeclidinium (INCRUSE ELLIPTA) 62.5  "MCG/INH oral inhaler Inhale 1 puff into the lungs daily       warfarin (COUMADIN) 3 MG tablet Use as directed. 30 tablet 0     warfarin (COUMADIN) 5 MG tablet Take 5 - 7.5mg daily or as directed by coumadin clinic. 90 tablet 5     zinc sulfate (ZINCATE) 220 (50 ZN) MG capsule Take 220 mg by mouth 2 times daily         ROS:   Constitutional: No fever, chills, or sweats. Weight loss.  ENT: No visual disturbance, ear ache, epistaxis, sore throat.   Allergies/Immunologic: Negative.   Respiratory: As per HPI.  Cardiovascular: As per HPI.   GI: No nausea, vomiting, hematemesis, melena, or hematochezia.   : No urinary frequency, dysuria, or hematuria.   Integument: Negative.   Psychiatric: Negative.   Neuro: Negative.   Endocrinology: Negative.   Musculoskeletal: Negative.    EXAM:  BP (!) 78/0 (BP Location: Right arm, Patient Position: Chair, Cuff Size: Adult Large)   Pulse 64   Temp 98.4  F (36.9  C) (Oral)   Ht 1.727 m (5' 8\")   Wt 122.7 kg (270 lb 9.6 oz)   SpO2 97%   BMI 41.14 kg/m    General: appears comfortable, alert and articulate  Head: normocephalic, atraumatic  Eyes: anicteric sclera, EOMI  Neck: no adenopathy  Orophyarynx: moist mucosa, no lesions, dentition intact  Heart: mechanical LVAD hum, JVD negative when sitting upright.  Lungs: expiratory wheezing, otherwise clear with equal aeration  Abdomen: soft, non-tender, bowel sounds present, no hepatosplenomegaly  Extremities: no clubbing, cyanosis or LE edema  Neurological: normal speech and affect, no gross motor deficits  Integument: No open lesions, rashes, or jaundice. Driveline site covered, dressing c/d/i.    LVAD interrogation reviewed with LVAD coordinator, agree with findings.  2 \"low voltage advisory\" alarms noted.  No other signs of pump malfunction.    Labs:  CBC RESULTS:  Lab Results   Component Value Date    WBC 6.7 03/19/2019    RBC 4.61 03/19/2019    HGB 12.3 (L) 03/19/2019    HCT 42.0 03/19/2019    MCV 91 03/19/2019    MCH 26.7 " 03/19/2019    MCHC 29.3 (L) 03/19/2019    RDW 14.6 03/19/2019     03/19/2019       CMP RESULTS:  Lab Results   Component Value Date     03/19/2019    POTASSIUM 4.2 03/19/2019    CHLORIDE 100 03/19/2019    CO2 29 03/19/2019    ANIONGAP 7 03/19/2019    GLC 99 03/19/2019    BUN 32 (H) 03/19/2019    CR 1.32 (H) 03/19/2019    GFRESTIMATED 58 (L) 03/19/2019    GFRESTBLACK 67 03/19/2019    QAMAR 9.0 03/19/2019    BILITOTAL PENDING 03/19/2019    ALBUMIN PENDING 03/19/2019    ALKPHOS PENDING 03/19/2019    ALT PENDING 03/19/2019    AST PENDING 03/19/2019        INR RESULTS:  Lab Results   Component Value Date    INR 2.63 (H) 03/19/2019       Lab Results   Component Value Date    MAG 2.1 06/30/2017     Lab Results   Component Value Date    NTBNPI 4,216 (H) 06/15/2017     Lab Results   Component Value Date    NTBNP 1,351 (H) 07/06/2018       Assessment and Plan:   Mr. Jim Willingham is a 63yr old male with a history of NICM (LVEF 10-20%) s/p HMII LVAD (6/19/17, c/b arrhythmias, WALTER, and left pleural effusion), RV dysfunction, CKD, DMII, CECILIA, obesity, COPD, asthma, and HTN who presents to clinic for follow up.    Labs today show stable electrolytes, kidney function, liver function, and blood counts.  INR 2.63.    Mr. Willingham remains euvolemic on bumex 2mg twice daily.  His MAPs today are at goal.  Advised that he continue current meds, with no changes, and to follow up with Dr. Montgomery as scheduled in June.    Chronic systolic heart failure secondary to NICM  Moderate RV dysfunction  Stage D  NYHA Class III  ACEi/ARB:  Yes, losartan 37.5mg daily  BB:  Yes, metoprolol XL 50mg in the morning and 25mg in the evening  Aldosterone antagonist:  Yes, spironolactone 25mg daily  SCD prophylaxis:  CRT-d  Fluid status euvolemic, continue bumex 2mg BID  Follow-up:  RTC to see Dr. Montgomery in June 2019    S/p HMII LVAD (6/19/17)  Antiplatelet:  Aspirin 81mg daily  Anticoagulation:  Warfarin, dosed per INR clinic with goal INR 2-3.   INR today 2.63.  MAP:  78 today in clinic  LDH:  321, stable    Bronchitis  Reports that his breathing has improved since starting prednisone and cefdinir.  He will complete his 5-d course of prednisone today, and feels like he could benefit from a few additional doses.  Will give him 2 more days of prednisone (20mg po BID x 2 days), and asked that he follow up with PCP if his symptoms do not improve after additional prednisone and cefdinir.    CECILIA  Discussed sleep medicine referral for repeat sleep study (as it has been >10 yrs and his machine is not working properly).  He is receptive, and will review with his pulmonologist.      Other:  VT/VF:  On BB, as noted above.  pAF:  Remains on BB and anticoagulation.  HR regular today.  HTN:  MAPs at goal.  Continues on BB and losartan.  Chronic pain:  On Tramadol.  CKD:  Creatinine remains stable.  MICHAEL:  Hgb improved since IV Iron infusions.  Obesity, with BMI > 35:  Encouraged continued dietary monitoring as well as increased aerobic activity to reach weight loss goals.        The above was reviewed with Mr. Willingham, who verbalized understanding and will call with further questions/concerns.     30 minutes spent face-to-face with patient, with >50% in counseling and/or coordination of care as described above.        Shelia Means, DNP, APRN, FNP-BC  Advanced Heart Failure Nurse Practitioner  [facility]  Patient Care Team:  Jovany Salas MD as PCP - General (Internal Medicine)  Elfego Hayden MD as MD (Internal Medicine)  Catalina Montgomery MD as Referring Physician (Cardiology)  Rajani Abdi, RN as Registered Nurse  [facility]

## 2019-03-19 NOTE — NURSING NOTE
"1). PUMP DATA  Primary controller serial number: [id number]    HM II:   Flow: 4.7 L/min,    Speed: 9400 RPMs,     PI: 5.1 ,  Power: 5.6 Manuel,      Primary controller   Back up battery: Patient use: 58, Replace in: 9  Months     Data downloaded: No   Equipment and driveline assessed for damage: Yes     Back up : Serial number: [id number]  Back up battery: Patient use: 4 Replace in: 11  Months  Programmed settings identical to the settings on the primary controller :Yes      Education complete: Yes   Charge the BACKUP controller s backup battery every 6 months  Perform a self test on BACKUP every 6 months  Change the MPU s batteries every 6 months:Yes  Have equipment serviced yearly (if applicable):Yes    2). ALARMS  Alarms reported by patient since last pump evaluation: Yes - pt reported \"low voltage advisory\" alarms on 3/17 and 3/14 - these correlate with controller interrogation. Pt reports fully charged batteries and regularly cleaning batteries and contacts. Inspected clips and noted wearing on the metal contacts. Replaced clips with S/N [id number]    Alarms or other finding noted during pump data history and alarm download: No    Action Taken:  Reviewed data with patient: Yes      3). DRESSING CHANGE / DRIVELINE ASSESSMENT  Dressing change completed today: No  Appearance of Driveline site: c/d/i per pt report    Driveline stabilization: Method: [employer]  [ Teaching reinforced on need for stabilization of Driveline. ]    4).Pt. Education    D:  Pt education provided.  I:  Pt s with [employer] educated on the following topics:  1. Reviewed Care of Batteries.  a. When to rotate.  b. When to calibrate.  c. When they .  d. When to clean Contacts.  2. Reviewed MPU   a. When to change batteries.  3. Reviewed Backup Controller  a. When to charge.  b. When to do self-test.  4. Inspected pt. s wearables  5. Pt given a handout with a Maintenance schedule.  "

## 2019-03-19 NOTE — PATIENT INSTRUCTIONS
It was a pleasure to see you in clinic today.  Please do not hesitate to call with any questions or concerns.  We look forward to seeing you in clinic at your next device check in 3 months.    Michelle Nash, RN, BSN  Electrophysiology Nurse Clinician  [facility]    During Business Hours Please Call:  [phone]  After Hours Please Call:  [phone] - select option #4 and ask for job code [phone]

## 2019-03-19 NOTE — PATIENT INSTRUCTIONS
Medications:  1. Take an additional 20mg prednisone twice daily for 2 days. Follow-up with your PCP if wheezing continues    Follow-up:  1. 6/7 with Dr. Montgomery - schedule your right heart cath and echo to coincide with this appointment  2. Make an appt for 6 months from now with an NP    Instructions:  1. Keep up the good work  2. Try to increase your aerobic exercise     Page the VAD Coordinator on call if you gain more than 3 lb in a day or 5 in a week. Please also page if you feel unwell or have alarms.     Great to see you in clinic today. To Page the VAD Coordinator on call, dial [phone] option #4 and ask to speak to the VAD coordinator on call.

## 2019-03-26 DIAGNOSIS — Z95.811 LVAD (LEFT VENTRICULAR ASSIST DEVICE) PRESENT (H): ICD-10-CM

## 2019-03-27 ENCOUNTER — ANTICOAGULATION THERAPY VISIT (OUTPATIENT)
Dept: ANTICOAGULATION | Facility: CLINIC | Age: 64
End: 2019-03-27

## 2019-03-27 DIAGNOSIS — Z95.811 LVAD (LEFT VENTRICULAR ASSIST DEVICE) PRESENT (H): ICD-10-CM

## 2019-03-27 DIAGNOSIS — Z79.01 LONG TERM CURRENT USE OF ANTICOAGULANT THERAPY: ICD-10-CM

## 2019-03-27 LAB
INR BLD: 3.5 (ref 0.86–1.14)
INR PPP: 2.78 (ref 0.86–1.14)

## 2019-03-27 PROCEDURE — 36415 COLL VENOUS BLD VENIPUNCTURE: CPT | Performed by: INTERNAL MEDICINE

## 2019-03-27 PROCEDURE — 85610 PROTHROMBIN TIME: CPT | Performed by: INTERNAL MEDICINE

## 2019-03-27 NOTE — PROGRESS NOTES
ANTICOAGULATION FOLLOW-UP CLINIC VISIT    Patient Name:  Jim Willingham  Date:  3/27/2019  Contact Type:  Telephone    SUBJECTIVE:     Patient Findings     Comments:   Patient had fingerstick today that came back at 3.5 and fingerstick result is 2.78.           OBJECTIVE    INR Point of Care   Date Value Ref Range Status   2019 3.5 (H) 0.86 - 1.14 Final     Comment:     This test is intended for monitoring Coumadin therapy.  Results are not   accurate in patients with prolonged INR due to factor deficiency.         ASSESSMENT / PLAN  No question data found.  Anticoagulation Summary  As of 3/27/2019    INR goal:   2.0-3.0   TTR:   78.7 % (1.7 y)   INR used for dosin.78 (3/27/2019)   Warfarin maintenance plan:   10 mg (5 mg x 2) every Mon, Fri; 7.5 mg (5 mg x 1.5) all other days   Full warfarin instructions:   10 mg every Mon, Fri; 7.5 mg all other days   Weekly warfarin total:   57.5 mg   No change documented:   Maru Alonso RN   Plan last modified:   Maru Alonso RN (2019)   Next INR check:   4/3/2019   Priority:   INR   Target end date:       Indications    LVAD (left ventricular assist device) present (H) [Z95.811]  Long-term (current) use of anticoagulants [Z79.01] [Z79.01]             Anticoagulation Episode Summary     INR check location:       Preferred lab:       Send INR reminders to:   [facility]    Comments:   HIPPA Forms mailed 17  Labs drawn either at [facility] or [facility]  LVAD placed 17   II  ASA 81 mg daily      Anticoagulation Care Providers     Provider Role Specialty Phone number    Delisa Montgomery MD Responsible Cardiology [phone]            See the Encounter Report to view Anticoagulation Flowsheet and Dosing Calendar (Go to Encounters tab in chart review, and find the Anticoagulation Therapy Visit)    Spoke with patient.     Maru Alonso RN

## 2019-03-28 RX ORDER — LOSARTAN POTASSIUM 25 MG/1
37.5 TABLET ORAL DAILY
Qty: 135 TABLET | Refills: 3 | Status: ON HOLD | OUTPATIENT
Start: 2019-03-28 | End: 2020-01-23

## 2019-03-28 NOTE — TELEPHONE ENCOUNTER
"  losartan (COZAAR) 25 MG tablet    Last Written Prescription Date:  11/20/18  Last Fill Quantity: 90,   # refills: 1  Last Office Visit : 3/19/19  Future Office visit: 6/7/19    Jeromy LO    Reviewed By Shelia Candelaria NP on 3/19/2019 11:10 AM     No change on med list.    3/19/19 NINO Means  \" Advised that he continue current meds, with no changes\"      routed because : high med alert  "

## 2019-04-03 ENCOUNTER — ANTICOAGULATION THERAPY VISIT (OUTPATIENT)
Dept: ANTICOAGULATION | Facility: CLINIC | Age: 64
End: 2019-04-03

## 2019-04-03 DIAGNOSIS — Z95.811 LVAD (LEFT VENTRICULAR ASSIST DEVICE) PRESENT (H): ICD-10-CM

## 2019-04-03 DIAGNOSIS — Z79.01 LONG TERM CURRENT USE OF ANTICOAGULANT THERAPY: ICD-10-CM

## 2019-04-03 LAB — INR PPP: 2.55 (ref 0.86–1.14)

## 2019-04-03 PROCEDURE — 85610 PROTHROMBIN TIME: CPT | Performed by: INTERNAL MEDICINE

## 2019-04-03 PROCEDURE — 36415 COLL VENOUS BLD VENIPUNCTURE: CPT | Performed by: INTERNAL MEDICINE

## 2019-04-03 NOTE — PROGRESS NOTES
ANTICOAGULATION FOLLOW-UP CLINIC VISIT    Patient Name:  Jim Willingham  Date:  4/3/2019  Contact Type:  Telephone    SUBJECTIVE:        OBJECTIVE    INR   Date Value Ref Range Status   2019 2.55 (H) 0.86 - 1.14 Final       ASSESSMENT / PLAN  INR assessment THER    Recheck INR In: 2 WEEKS    INR Location Clinic      Anticoagulation Summary  As of 4/3/2019    INR goal:   2.0-3.0   TTR:   78.9 % (1.7 y)   INR used for dosin.55 (4/3/2019)   Warfarin maintenance plan:   10 mg (5 mg x 2) every Mon, Fri; 7.5 mg (5 mg x 1.5) all other days   Full warfarin instructions:   10 mg every Mon, Fri; 7.5 mg all other days   Weekly warfarin total:   57.5 mg   Plan last modified:   Maru Alonso RN (2019)   Next INR check:   2019   Priority:   INR   Target end date:       Indications    LVAD (left ventricular assist device) present (H) [Z95.811]  Long-term (current) use of anticoagulants [Z79.01] [Z79.01]             Anticoagulation Episode Summary     INR check location:       Preferred lab:       Send INR reminders to:   [facility]    Comments:   HIPPA Forms mailed 17  Labs drawn either at [facility] or [facility]  LVAD placed 17   II  ASA 81 mg daily      Anticoagulation Care Providers     Provider Role Specialty Phone number    UlisesDelisa travis MD Responsible Cardiology [phone]            See the Encounter Report to view Anticoagulation Flowsheet and Dosing Calendar (Go to Encounters tab in chart review, and find the Anticoagulation Therapy Visit)  Left message for patient with results and dosing recommendations. Asked patient to call back to report any missed doses, falls, signs and symptoms of bleeding or clotting, any changes in health, medication, or diet. Asked patient to call back with any questions or concerns.    Delisa Hillman RN

## 2019-04-10 ENCOUNTER — ANTICOAGULATION THERAPY VISIT (OUTPATIENT)
Dept: ANTICOAGULATION | Facility: CLINIC | Age: 64
End: 2019-04-10

## 2019-04-10 DIAGNOSIS — Z95.811 LVAD (LEFT VENTRICULAR ASSIST DEVICE) PRESENT (H): ICD-10-CM

## 2019-04-10 DIAGNOSIS — Z79.01 LONG TERM CURRENT USE OF ANTICOAGULANT THERAPY: ICD-10-CM

## 2019-04-10 LAB — INR PPP: 2.35 (ref 0.86–1.14)

## 2019-04-10 PROCEDURE — 85610 PROTHROMBIN TIME: CPT | Performed by: INTERNAL MEDICINE

## 2019-04-10 PROCEDURE — 36415 COLL VENOUS BLD VENIPUNCTURE: CPT | Performed by: INTERNAL MEDICINE

## 2019-04-10 NOTE — PROGRESS NOTES
ANTICOAGULATION FOLLOW-UP CLINIC VISIT    Patient Name:  Jim Willingham  Date:  4/10/2019  Contact Type:  Telephone    SUBJECTIVE:     Patient Findings     Comments:   Jim reports no changes in health, diet, medications.  No signs of bleeding/bruising.            OBJECTIVE    INR   Date Value Ref Range Status   04/10/2019 2.35 (H) 0.86 - 1.14 Final       ASSESSMENT / PLAN  INR assessment THER    Recheck INR In: 2 WEEKS    INR Location Clinic      Anticoagulation Summary  As of 4/10/2019    INR goal:   2.0-3.0   TTR:   79.1 % (1.8 y)   INR used for dosin.35 (4/10/2019)   Warfarin maintenance plan:   10 mg (5 mg x 2) every Mon, Fri; 7.5 mg (5 mg x 1.5) all other days   Full warfarin instructions:   10 mg every Mon, Fri; 7.5 mg all other days   Weekly warfarin total:   57.5 mg   No change documented:   Kirsty Bermudez RN   Plan last modified:   Maru Alonso RN (2019)   Next INR check:   2019   Priority:   INR   Target end date:       Indications    LVAD (left ventricular assist device) present (H) [Z95.811]  Long-term (current) use of anticoagulants [Z79.01] [Z79.01]             Anticoagulation Episode Summary     INR check location:       Preferred lab:       Send INR reminders to:    [facility]    Comments:   HIPPA Forms mailed 17  Labs drawn either at [facility] or [facility]  LVAD placed 17   II  ASA 81 mg daily      Anticoagulation Care Providers     Provider Role Specialty Phone number    Delisa Montgomery MD [facility] Cardiology [phone]            See the Encounter Report to view Anticoagulation Flowsheet and Dosing Calendar (Go to Encounters tab in chart review, and find the Anticoagulation Therapy Visit)    Spoke with Jim.  He reports no changes in health, diet, medications.      Patient had LVAD placed on:   17  HM II  Patient's current Aspirin dose: 81 mg daily  LVAD Protocol followed: Yes    If Not Followed Explanation:   RIGOBERTO Bermudez RN

## 2019-04-24 ENCOUNTER — ANTICOAGULATION THERAPY VISIT (OUTPATIENT)
Dept: ANTICOAGULATION | Facility: CLINIC | Age: 64
End: 2019-04-24

## 2019-04-24 DIAGNOSIS — Z79.01 LONG TERM CURRENT USE OF ANTICOAGULANT THERAPY: ICD-10-CM

## 2019-04-24 DIAGNOSIS — Z95.811 LVAD (LEFT VENTRICULAR ASSIST DEVICE) PRESENT (H): ICD-10-CM

## 2019-04-24 LAB — INR PPP: 2.68 (ref 0.86–1.14)

## 2019-04-24 PROCEDURE — 85610 PROTHROMBIN TIME: CPT | Performed by: INTERNAL MEDICINE

## 2019-04-24 PROCEDURE — 36415 COLL VENOUS BLD VENIPUNCTURE: CPT | Performed by: INTERNAL MEDICINE

## 2019-05-01 ENCOUNTER — DOCUMENTATION ONLY (OUTPATIENT)
Dept: CARE COORDINATION | Facility: CLINIC | Age: 64
End: 2019-05-01

## 2019-05-08 ENCOUNTER — ANTICOAGULATION THERAPY VISIT (OUTPATIENT)
Dept: ANTICOAGULATION | Facility: CLINIC | Age: 64
End: 2019-05-08

## 2019-05-08 DIAGNOSIS — Z95.811 LVAD (LEFT VENTRICULAR ASSIST DEVICE) PRESENT (H): ICD-10-CM

## 2019-05-08 DIAGNOSIS — Z79.01 LONG TERM CURRENT USE OF ANTICOAGULANT THERAPY: ICD-10-CM

## 2019-05-08 LAB — INR PPP: 1.89 (ref 0.86–1.14)

## 2019-05-08 PROCEDURE — 85610 PROTHROMBIN TIME: CPT | Performed by: INTERNAL MEDICINE

## 2019-05-08 PROCEDURE — 36415 COLL VENOUS BLD VENIPUNCTURE: CPT | Performed by: INTERNAL MEDICINE

## 2019-05-08 NOTE — PROGRESS NOTES
ANTICOAGULATION FOLLOW-UP CLINIC VISIT    Patient Name:  Jim Willingham  Date:  2019  Contact Type:  Telephone    SUBJECTIVE:     Patient Findings     Positives:   Missed doses    Comments:   Spoke to patient.  Recorded missed dose on Calendar.  Recommended he take 10mg of Coumadin one time today (Wednesday), then resume maintenance dose.  Increased Aspirin to 325mg per protocol until INR is therapeutic.  Patient prefers to come back in one week.           OBJECTIVE    INR   Date Value Ref Range Status   2019 1.89 (H) 0.86 - 1.14 Final       ASSESSMENT / PLAN  INR assessment SUB    Recheck INR In: 1 WEEK    INR Location Clinic      Anticoagulation Summary  As of 2019    INR goal:   2.0-3.0   TTR:   79.7 % (1.8 y)   INR used for dosin.89! (2019)   Warfarin maintenance plan:   10 mg (5 mg x 2) every Mon, Fri; 7.5 mg (5 mg x 1.5) all other days   Full warfarin instructions:   : 10 mg; Otherwise 10 mg every Mon, Fri; 7.5 mg all other days   Weekly warfarin total:   57.5 mg   Plan last modified:   Maru Alonso RN (2019)   Next INR check:   5/15/2019   Priority:   INR   Target end date:       Indications    LVAD (left ventricular assist device) present (H) [Z95.811]  Long-term (current) use of anticoagulants [Z79.01] [Z79.01]             Anticoagulation Episode Summary     INR check location:       Preferred lab:       Send INR reminders to:   [facility]    Comments:   HIPPA Forms mailed 17  Labs drawn either at [facility] or [facility]  LVAD placed 17   II  ASA 81 mg daily      Anticoagulation Care Providers     Provider Role Specialty Phone number    Delisa Montgomery MD Responsible Cardiology [phone]            See the Encounter Report to view Anticoagulation Flowsheet and Dosing Calendar (Go to Encounters tab in chart review, and find the Anticoagulation Therapy Visit)    Spoke to Jim.  Explainable INR result.  Increased ASA.  Updated  calendar.    Patient had LVAD placed on:   6/26/17  Patient's current Aspirin dose: 325mg daily until INR is therapeutic  LVAD Protocol followed:  No.   If Not Followed Explanation:  Patient is returning to the lab in one week.    Brendan Montoya, RN

## 2019-05-15 ENCOUNTER — ANTICOAGULATION THERAPY VISIT (OUTPATIENT)
Dept: ANTICOAGULATION | Facility: CLINIC | Age: 64
End: 2019-05-15

## 2019-05-15 DIAGNOSIS — Z79.01 LONG TERM CURRENT USE OF ANTICOAGULANT THERAPY: ICD-10-CM

## 2019-05-15 DIAGNOSIS — Z95.811 LVAD (LEFT VENTRICULAR ASSIST DEVICE) PRESENT (H): ICD-10-CM

## 2019-05-15 LAB — INR PPP: 2.46 (ref 0.86–1.14)

## 2019-05-15 PROCEDURE — 85610 PROTHROMBIN TIME: CPT | Performed by: INTERNAL MEDICINE

## 2019-05-15 PROCEDURE — 36415 COLL VENOUS BLD VENIPUNCTURE: CPT | Performed by: INTERNAL MEDICINE

## 2019-05-15 NOTE — PROGRESS NOTES
ANTICOAGULATION FOLLOW-UP CLINIC VISIT    Patient Name:  Jim Willingham  Date:  5/15/2019  Contact Type:  Telephone    SUBJECTIVE:  Patient Findings     Comments:   Recommended patient take 10mg of Coumadin on MWF, 7.5mg all other days of the week. Will check in two weeks.  Reduced Aspirin to 81mg daily.        Clinical Outcomes     Negatives:   Major bleeding event, Thromboembolic event, Anticoagulation-related hospital admission, Anticoagulation-related ED visit, Anticoagulation-related fatality    Comments:   Recommended patient take 10mg of Coumadin on MWF, 7.5mg all other days of the week. Will check in two weeks.  Reduced Aspirin to 81mg daily.           OBJECTIVE    INR   Date Value Ref Range Status   05/15/2019 2.46 (H) 0.86 - 1.14 Final       ASSESSMENT / PLAN  INR assessment THER    Recheck INR In: 2 WEEKS    INR Location Clinic      Anticoagulation Summary  As of 5/15/2019    INR goal:   2.0-3.0   TTR:   79.7 % (1.8 y)   INR used for dosin.46 (5/15/2019)   Warfarin maintenance plan:   10 mg (5 mg x 2) every Mon, Wed, Fri; 7.5 mg (5 mg x 1.5) all other days   Full warfarin instructions:   10 mg every Mon, Wed, Fri; 7.5 mg all other days   Weekly warfarin total:   60 mg   Plan last modified:   Brendan Montoya RN (5/15/2019)   Next INR check:   2019   Priority:   INR   Target end date:       Indications    LVAD (left ventricular assist device) present (H) [Z95.811]  Long-term (current) use of anticoagulants [Z79.01] [Z79.01]             Anticoagulation Episode Summary     INR check location:       Preferred lab:       Send INR reminders to:   [facility]    Comments:   HIPPA Forms mailed 17  Labs drawn either at [facility] or [facility]  LVAD placed 17   II  ASA 81 mg daily      Anticoagulation Care Providers     Provider Role Specialty Phone number    Delisa Montgomery MD [facility] Cardiology [phone]            See the Encounter Report to view  Anticoagulation Flowsheet and Dosing Calendar (Go to Encounters tab in chart review, and find the Anticoagulation Therapy Visit)    Spoke with patient. Gave them their lab results and new warfarin recommendation.  No changes in health, medication, or diet. No missed doses, no falls. No signs or symptoms of bleed or clotting.    Patient had LVAD placed on:   6/26/17  Patient's current Aspirin dose: Returned to 81mg (INR is therapeutic)  LVAD Protocol followed:  Yes.   If Not Followed Explanation:  Brendan Garcia RN

## 2019-05-16 DIAGNOSIS — M10.00 ACUTE IDIOPATHIC GOUT, UNSPECIFIED SITE: ICD-10-CM

## 2019-05-19 NOTE — TELEPHONE ENCOUNTER
allopurinol (ZYLOPRIM) 100 MG     Last Written Prescription Date:  5/16/18  Last Fill Quantity: 60,   # refills: 5  Last Office Visit : 3/19/19  Future Office visit:  6/7/19    Routing refill request to provider for review/approval because:  allopurinol (ZYLOPRIM) 100 MG

## 2019-05-22 ENCOUNTER — TRANSFERRED RECORDS (OUTPATIENT)
Dept: HEALTH INFORMATION MANAGEMENT | Facility: CLINIC | Age: 64
End: 2019-05-22

## 2019-05-24 DIAGNOSIS — Z95.811 LVAD (LEFT VENTRICULAR ASSIST DEVICE) PRESENT (H): ICD-10-CM

## 2019-05-25 NOTE — TELEPHONE ENCOUNTER
"  3/19/19  C Miguelangel  \" ACEi/ARB:  Yes, losartan 37.5mg daily    Routing refill request to provider for review/approval because: Pt tells pharmacy he is to be on 50 mg . Per last note 37.5 mg.  Med list 37.5 mg x 1 yr.  Please clarify. Thanks.  "

## 2019-05-29 ENCOUNTER — DOCUMENTATION ONLY (OUTPATIENT)
Dept: CARE COORDINATION | Facility: CLINIC | Age: 64
End: 2019-05-29

## 2019-05-29 ENCOUNTER — ANTICOAGULATION THERAPY VISIT (OUTPATIENT)
Dept: ANTICOAGULATION | Facility: CLINIC | Age: 64
End: 2019-05-29

## 2019-05-29 DIAGNOSIS — Z79.01 LONG TERM CURRENT USE OF ANTICOAGULANT THERAPY: ICD-10-CM

## 2019-05-29 DIAGNOSIS — Z95.811 LVAD (LEFT VENTRICULAR ASSIST DEVICE) PRESENT (H): ICD-10-CM

## 2019-05-29 DIAGNOSIS — I50.22 CHRONIC SYSTOLIC CONGESTIVE HEART FAILURE (H): Primary | ICD-10-CM

## 2019-05-29 LAB — INR PPP: 2.61 (ref 0.86–1.14)

## 2019-05-29 PROCEDURE — 36415 COLL VENOUS BLD VENIPUNCTURE: CPT | Performed by: INTERNAL MEDICINE

## 2019-05-29 PROCEDURE — 85610 PROTHROMBIN TIME: CPT | Performed by: INTERNAL MEDICINE

## 2019-05-29 RX ORDER — LOSARTAN POTASSIUM 25 MG/1
37.5 TABLET ORAL DAILY
Qty: 140 TABLET | Refills: 3 | Status: ON HOLD | OUTPATIENT
Start: 2019-05-29 | End: 2019-07-24

## 2019-05-29 RX ORDER — ALLOPURINOL 100 MG/1
100 TABLET ORAL DAILY
Qty: 60 TABLET | Refills: 5 | OUTPATIENT
Start: 2019-05-29

## 2019-05-29 NOTE — PROGRESS NOTES
ANTICOAGULATION FOLLOW-UP CLINIC VISIT    Patient Name:  Jim Willingham  Date:  2019  Contact Type:  Telephone    SUBJECTIVE:         OBJECTIVE    INR   Date Value Ref Range Status   2019 2.61 (H) 0.86 - 1.14 Final       ASSESSMENT / PLAN  INR assessment THER    Recheck INR In: 10 DAYS    INR Location Clinic      Anticoagulation Summary  As of 2019    INR goal:   2.0-3.0   TTR:   80.1 % (1.9 y)   INR used for dosin.61 (2019)   Warfarin maintenance plan:   10 mg (5 mg x 2) every Mon, Wed, Fri; 7.5 mg (5 mg x 1.5) all other days   Full warfarin instructions:   10 mg every Mon, Wed, Fri; 7.5 mg all other days   Weekly warfarin total:   60 mg   No change documented:   Brendan Montoya RN   Plan last modified:   Brendan Montoya RN (5/15/2019)   Next INR check:   2019   Priority:   INR   Target end date:       Indications    LVAD (left ventricular assist device) present (H) [Z95.811]  Long-term (current) use of anticoagulants [Z79.01] [Z79.01]             Anticoagulation Episode Summary     INR check location:       Preferred lab:       Send INR reminders to:   [facility]    Comments:   HIPPA Forms mailed 17  Labs drawn either at [facility] or [facility]  LVAD placed 17   II  ASA 81 mg daily      Anticoagulation Care Providers     Provider Role Specialty Phone number    Ulises Delisa Sprague MD [facility] Cardiology [phone]            See the Encounter Report to view Anticoagulation Flowsheet and Dosing Calendar (Go to Encounters tab in chart review, and find the Anticoagulation Therapy Visit)        Spoke with patient. Gave them their lab results and new warfarin recommendation.  No changes in health, medication, or diet. No missed doses, no falls. No signs or symptoms of bleed or clotting.    Patient had LVAD placed on:   17  Patient's current Aspirin dose: 81mg  LVAD Protocol followed:  Yes.   If Not Followed Explanation:  NA    Patient returns  to lab on 6/7 for RHC. Sent message to [facility] pool to follow up post procedure.    Brendan Montoya, RN

## 2019-05-29 NOTE — TELEPHONE ENCOUNTER
Called pt to request that refills of allopurinol be directed to PCP. Pt verbalized understanding.

## 2019-06-07 ENCOUNTER — ANTICOAGULATION THERAPY VISIT (OUTPATIENT)
Dept: ANTICOAGULATION | Facility: CLINIC | Age: 64
End: 2019-06-07

## 2019-06-07 DIAGNOSIS — Z95.811 LVAD (LEFT VENTRICULAR ASSIST DEVICE) PRESENT (H): ICD-10-CM

## 2019-06-12 ENCOUNTER — ANTICOAGULATION THERAPY VISIT (OUTPATIENT)
Dept: ANTICOAGULATION | Facility: CLINIC | Age: 64
End: 2019-06-12

## 2019-06-12 DIAGNOSIS — Z95.811 LVAD (LEFT VENTRICULAR ASSIST DEVICE) PRESENT (H): ICD-10-CM

## 2019-06-12 DIAGNOSIS — Z79.01 LONG TERM CURRENT USE OF ANTICOAGULANT THERAPY: ICD-10-CM

## 2019-06-12 LAB — INR PPP: 2.29 (ref 0.86–1.14)

## 2019-06-12 PROCEDURE — 85610 PROTHROMBIN TIME: CPT | Performed by: INTERNAL MEDICINE

## 2019-06-12 PROCEDURE — 36415 COLL VENOUS BLD VENIPUNCTURE: CPT | Performed by: INTERNAL MEDICINE

## 2019-06-12 NOTE — PROGRESS NOTES
ANTICOAGULATION FOLLOW-UP CLINIC VISIT    Patient Name:  Jim Willingham  Date:  2019  Contact Type:  Telephone    SUBJECTIVE:  Patient Findings     Comments:   Jim reports no changes in health, diet, medications.        Clinical Outcomes     Comments:   Jim reports no changes in health, diet, medications.           OBJECTIVE    INR   Date Value Ref Range Status   2019 2.29 (H) 0.86 - 1.14 Final       ASSESSMENT / PLAN  INR assessment THER    Recheck INR In: 2 WEEKS    INR Location Clinic      Anticoagulation Summary  As of 2019    INR goal:   2.0-3.0   TTR:   80.5 % (1.9 y)   INR used for dosin.29 (2019)   Warfarin maintenance plan:   10 mg (5 mg x 2) every Mon, Wed, Fri; 7.5 mg (5 mg x 1.5) all other days   Full warfarin instructions:   10 mg every Mon, Wed, Fri; 7.5 mg all other days   Weekly warfarin total:   60 mg   No change documented:   Kirsty Bermudez RN   Plan last modified:   Brendan Montoya RN (5/15/2019)   Next INR check:   2019   Priority:   INR   Target end date:       Indications    LVAD (left ventricular assist device) present (H) [Z95.811]  Long-term (current) use of anticoagulants [Z79.01] [Z79.01]             Anticoagulation Episode Summary     INR check location:       Preferred lab:       Send INR reminders to:   [facility]    Comments:   HIPPA Forms mailed 17  Labs drawn either at [facility] or [facility]  LVAD placed 17   II  ASA 81 mg daily      Anticoagulation Care Providers     Provider Role Specialty Phone number    Delisa Montgomery MD [facility] Cardiology [phone]            See the Encounter Report to view Anticoagulation Flowsheet and Dosing Calendar (Go to Encounters tab in chart review, and find the Anticoagulation Therapy Visit)    Spoke with Jim.  He reports no changes in health, diet, medications.      Patient had LVAD placed on:   17   2  Patient's current Aspirin dose: 81 mg daily  LVAD  Protocol followed: Yes     Kirsty Bermudez RN

## 2019-06-17 ENCOUNTER — CARE COORDINATION (OUTPATIENT)
Dept: CARDIOLOGY | Facility: CLINIC | Age: 64
End: 2019-06-17

## 2019-06-17 PROBLEM — Z79.01 LONG TERM CURRENT USE OF ANTICOAGULANT THERAPY: Status: ACTIVE | Noted: 2017-06-26

## 2019-06-17 NOTE — PROGRESS NOTES
This writer called patient to check in and to introduce myself as pt's new temporary VAD Coordinator while his previous VAD Coordinator is away. Spoke to pt and gave him my direct number and also reminded pt to call VAD Coordinator on-call with any urgent needs. Pt verbalized understanding.

## 2019-06-23 DIAGNOSIS — K21.9 GASTROESOPHAGEAL REFLUX DISEASE WITHOUT ESOPHAGITIS: ICD-10-CM

## 2019-06-27 RX ORDER — PANTOPRAZOLE SODIUM 40 MG/1
40 TABLET, DELAYED RELEASE ORAL EVERY MORNING
Qty: 60 TABLET | Refills: 5 | Status: SHIPPED | OUTPATIENT
Start: 2019-06-27 | End: 2020-06-30

## 2019-06-27 NOTE — TELEPHONE ENCOUNTER
pantoprazole (PROTONIX) 40 MG EC tablet      Last Written Prescription Date:  6-8-18  Last Fill Quantity: 60,   # refills: 5  Last Office Visit : 3-19-19  Future Office visit:  9-27-19    Routing refill request to provider for review/approval because:  Med not on protocol

## 2019-07-01 ENCOUNTER — DOCUMENTATION ONLY (OUTPATIENT)
Dept: CARE COORDINATION | Facility: CLINIC | Age: 64
End: 2019-07-01

## 2019-07-01 DIAGNOSIS — I50.22 CHRONIC SYSTOLIC CONGESTIVE HEART FAILURE (H): Primary | ICD-10-CM

## 2019-07-02 ENCOUNTER — CARE COORDINATION (OUTPATIENT)
Dept: CARDIOLOGY | Facility: CLINIC | Age: 64
End: 2019-07-02

## 2019-07-02 NOTE — PROGRESS NOTES
D:  Pt did not show up for 1000 appt.  I:  Called pt and left message on phone for him to call and reschedule.

## 2019-07-03 ENCOUNTER — ANTICOAGULATION THERAPY VISIT (OUTPATIENT)
Dept: ANTICOAGULATION | Facility: CLINIC | Age: 64
End: 2019-07-03

## 2019-07-03 DIAGNOSIS — Z79.01 LONG TERM CURRENT USE OF ANTICOAGULANT THERAPY: ICD-10-CM

## 2019-07-03 DIAGNOSIS — Z95.811 LVAD (LEFT VENTRICULAR ASSIST DEVICE) PRESENT (H): ICD-10-CM

## 2019-07-03 DIAGNOSIS — I50.22 CHRONIC SYSTOLIC CONGESTIVE HEART FAILURE (H): ICD-10-CM

## 2019-07-03 LAB
ALBUMIN SERPL-MCNC: 3.7 G/DL (ref 3.4–5)
ALP SERPL-CCNC: 85 U/L (ref 40–150)
ALT SERPL W P-5'-P-CCNC: 35 U/L (ref 0–70)
ANION GAP SERPL CALCULATED.3IONS-SCNC: 7 MMOL/L (ref 3–14)
AST SERPL W P-5'-P-CCNC: 22 U/L (ref 0–45)
BASOPHILS # BLD AUTO: 0.1 10E9/L (ref 0–0.2)
BASOPHILS NFR BLD AUTO: 0.7 %
BILIRUB SERPL-MCNC: 0.6 MG/DL (ref 0.2–1.3)
BUN SERPL-MCNC: 42 MG/DL (ref 7–30)
CALCIUM SERPL-MCNC: 8.5 MG/DL (ref 8.5–10.1)
CHLORIDE SERPL-SCNC: 99 MMOL/L (ref 94–109)
CO2 SERPL-SCNC: 30 MMOL/L (ref 20–32)
CREAT SERPL-MCNC: 1.23 MG/DL (ref 0.66–1.25)
DIFFERENTIAL METHOD BLD: ABNORMAL
EOSINOPHIL # BLD AUTO: 0 10E9/L (ref 0–0.7)
EOSINOPHIL NFR BLD AUTO: 0.1 %
ERYTHROCYTE [DISTWIDTH] IN BLOOD BY AUTOMATED COUNT: 15.9 % (ref 10–15)
FERRITIN SERPL-MCNC: 46 NG/ML (ref 26–388)
GFR SERPL CREATININE-BSD FRML MDRD: 62 ML/MIN/{1.73_M2}
GLUCOSE SERPL-MCNC: 215 MG/DL (ref 70–99)
HCT VFR BLD AUTO: 36.5 % (ref 40–53)
HGB BLD-MCNC: 11.2 G/DL (ref 13.3–17.7)
IMM GRANULOCYTES # BLD: 0 10E9/L (ref 0–0.4)
IMM GRANULOCYTES NFR BLD: 0.5 %
INR PPP: 2.69 (ref 0.86–1.14)
IRON SATN MFR SERPL: 14 % (ref 15–46)
IRON SERPL-MCNC: 51 UG/DL (ref 35–180)
LDH SERPL L TO P-CCNC: 353 U/L (ref 85–227)
LYMPHOCYTES # BLD AUTO: 1.6 10E9/L (ref 0.8–5.3)
LYMPHOCYTES NFR BLD AUTO: 20.8 %
MCH RBC QN AUTO: 26 PG (ref 26.5–33)
MCHC RBC AUTO-ENTMCNC: 30.7 G/DL (ref 31.5–36.5)
MCV RBC AUTO: 85 FL (ref 78–100)
MONOCYTES # BLD AUTO: 0.5 10E9/L (ref 0–1.3)
MONOCYTES NFR BLD AUTO: 6.7 %
NEUTROPHILS # BLD AUTO: 5.5 10E9/L (ref 1.6–8.3)
NEUTROPHILS NFR BLD AUTO: 71.2 %
NT-PROBNP SERPL-MCNC: 1109 PG/ML (ref 0–125)
PLATELET # BLD AUTO: 202 10E9/L (ref 150–450)
POTASSIUM SERPL-SCNC: 4 MMOL/L (ref 3.4–5.3)
PROT SERPL-MCNC: 6.9 G/DL (ref 6.8–8.8)
RBC # BLD AUTO: 4.31 10E12/L (ref 4.4–5.9)
SODIUM SERPL-SCNC: 136 MMOL/L (ref 133–144)
TIBC SERPL-MCNC: 367 UG/DL (ref 240–430)
TRANSFERRIN SERPL-MCNC: 276 MG/DL (ref 210–360)
TSH SERPL DL<=0.005 MIU/L-ACNC: 0.82 MU/L (ref 0.4–4)
URATE SERPL-MCNC: 6.8 MG/DL (ref 3.5–7.2)
VIT B12 SERPL-MCNC: 384 PG/ML (ref 193–986)
WBC # BLD AUTO: 7.7 10E9/L (ref 4–11)

## 2019-07-03 PROCEDURE — 82728 ASSAY OF FERRITIN: CPT | Performed by: NURSE PRACTITIONER

## 2019-07-03 PROCEDURE — 85610 PROTHROMBIN TIME: CPT | Performed by: NURSE PRACTITIONER

## 2019-07-03 PROCEDURE — 83550 IRON BINDING TEST: CPT | Performed by: NURSE PRACTITIONER

## 2019-07-03 PROCEDURE — 80053 COMPREHEN METABOLIC PANEL: CPT | Performed by: NURSE PRACTITIONER

## 2019-07-03 PROCEDURE — 85025 COMPLETE CBC W/AUTO DIFF WBC: CPT | Performed by: NURSE PRACTITIONER

## 2019-07-03 PROCEDURE — 83540 ASSAY OF IRON: CPT | Performed by: NURSE PRACTITIONER

## 2019-07-03 PROCEDURE — 84550 ASSAY OF BLOOD/URIC ACID: CPT | Performed by: NURSE PRACTITIONER

## 2019-07-03 PROCEDURE — 36415 COLL VENOUS BLD VENIPUNCTURE: CPT | Performed by: NURSE PRACTITIONER

## 2019-07-03 PROCEDURE — 82607 VITAMIN B-12: CPT | Performed by: NURSE PRACTITIONER

## 2019-07-03 PROCEDURE — 84466 ASSAY OF TRANSFERRIN: CPT | Performed by: NURSE PRACTITIONER

## 2019-07-03 PROCEDURE — 83615 LACTATE (LD) (LDH) ENZYME: CPT | Performed by: NURSE PRACTITIONER

## 2019-07-03 PROCEDURE — 84443 ASSAY THYROID STIM HORMONE: CPT | Performed by: NURSE PRACTITIONER

## 2019-07-03 PROCEDURE — 83880 ASSAY OF NATRIURETIC PEPTIDE: CPT | Performed by: NURSE PRACTITIONER

## 2019-07-03 NOTE — PROGRESS NOTES
ANTICOAGULATION FOLLOW-UP CLINIC VISIT    Patient Name:  Jim Willingham  Date:  7/3/2019  Contact Type:  Telephone    SUBJECTIVE:  Patient Findings     Comments:   Jim reports no changes in health, diet, medications.        Clinical Outcomes     Comments:   Jim reports no changes in health, diet, medications.           OBJECTIVE    INR   Date Value Ref Range Status   2019 2.69 (H) 0.86 - 1.14 Final       ASSESSMENT / PLAN  INR assessment THER    Recheck INR In: 2 WEEKS    INR Location Clinic      Anticoagulation Summary  As of 7/3/2019    INR goal:   2.0-3.0   TTR:   81.1 % (2 y)   INR used for dosin.69 (7/3/2019)   Warfarin maintenance plan:   10 mg (5 mg x 2) every Mon, Wed, Fri; 7.5 mg (5 mg x 1.5) all other days   Full warfarin instructions:   10 mg every Mon, Wed, Fri; 7.5 mg all other days   Weekly warfarin total:   60 mg   No change documented:   Kirsty Bermudez RN   Plan last modified:   Brendan Montoya RN (5/15/2019)   Next INR check:   2019   Priority:   INR   Target end date:       Indications    LVAD (left ventricular assist device) present (H) [Z95.811]  Long-term (current) use of anticoagulants [Z79.01] [Z79.01]             Anticoagulation Episode Summary     INR check location:       Preferred lab:       Send INR reminders to:   [facility]    Comments:   HIPPA Forms mailed 17  Labs drawn either at [facility] or [facility]  LVAD placed 17   II  ASA 81 mg daily      Anticoagulation Care Providers     Provider Role Specialty Phone number    Delisa Montgomery MD [facility] Cardiology [phone]            See the Encounter Report to view Anticoagulation Flowsheet and Dosing Calendar (Go to Encounters tab in chart review, and find the Anticoagulation Therapy Visit)    Spoke with Jim.  He reports no changes in health, diet, medications.    Patient had LVAD placed on:   17  Patient's current Aspirin dose: 81 mg daily  LVAD Protocol  followed: yes      Kirsty Bermudez RN

## 2019-07-17 ENCOUNTER — ANTICOAGULATION THERAPY VISIT (OUTPATIENT)
Dept: ANTICOAGULATION | Facility: CLINIC | Age: 64
End: 2019-07-17

## 2019-07-17 DIAGNOSIS — Z95.811 LVAD (LEFT VENTRICULAR ASSIST DEVICE) PRESENT (H): ICD-10-CM

## 2019-07-17 DIAGNOSIS — Z79.01 LONG TERM CURRENT USE OF ANTICOAGULANT THERAPY: ICD-10-CM

## 2019-07-17 LAB — INR PPP: 2.49 (ref 0.86–1.14)

## 2019-07-17 PROCEDURE — 36416 COLLJ CAPILLARY BLOOD SPEC: CPT | Performed by: INTERNAL MEDICINE

## 2019-07-17 PROCEDURE — 85610 PROTHROMBIN TIME: CPT | Performed by: INTERNAL MEDICINE

## 2019-07-17 NOTE — PROGRESS NOTES
ANTICOAGULATION FOLLOW-UP CLINIC VISIT    Patient Name:  Jim Willingham  Date:  2019  Contact Type:  Telephone    SUBJECTIVE:  Patient Findings     Comments:   Patient is having a RHC on .  Patient does not need to hold warfarin.         Clinical Outcomes     Comments:   Patient is having a RHC on .  Patient does not need to hold warfarin.            OBJECTIVE    INR   Date Value Ref Range Status   2019 2.49 (H) 0.86 - 1.14 Final       ASSESSMENT / PLAN  No question data found.  Anticoagulation Summary  As of 2019    INR goal:   2.0-3.0   TTR:   81.5 % (2 y)   INR used for dosin.49 (2019)   Warfarin maintenance plan:   10 mg (5 mg x 2) every Mon, Wed, Fri; 7.5 mg (5 mg x 1.5) all other days   Full warfarin instructions:   10 mg every Mon, Wed, Fri; 7.5 mg all other days   Weekly warfarin total:   60 mg   Plan last modified:   Brendan Montoya RN (5/15/2019)   Next INR check:   2019   Priority:   INR   Target end date:       Indications    LVAD (left ventricular assist device) present (H) [Z95.811]  Long-term (current) use of anticoagulants [Z79.01] [Z79.01]             Anticoagulation Episode Summary     INR check location:       Preferred lab:       Send INR reminders to:   [facility]    Comments:   HIPPA Forms mailed 17  Labs drawn either at [facility] or [facility]  LVAD placed 17   II  ASA 81 mg daily      Anticoagulation Care Providers     Provider Role Specialty Phone number    Delisa Montgomery MD [facility] Cardiology [phone]            See the Encounter Report to view Anticoagulation Flowsheet and Dosing Calendar (Go to Encounters tab in chart review, and find the Anticoagulation Therapy Visit)    Spoke with patient.     Maru Alonso RN

## 2019-07-24 ENCOUNTER — ANTICOAGULATION THERAPY VISIT (OUTPATIENT)
Dept: ANTICOAGULATION | Facility: CLINIC | Age: 64
End: 2019-07-24

## 2019-07-24 ENCOUNTER — OFFICE VISIT (OUTPATIENT)
Dept: CARDIOLOGY | Facility: CLINIC | Age: 64
End: 2019-07-24
Attending: INTERNAL MEDICINE
Payer: COMMERCIAL

## 2019-07-24 ENCOUNTER — HOSPITAL ENCOUNTER (OUTPATIENT)
Facility: CLINIC | Age: 64
Discharge: HOME | End: 2019-07-24
Attending: INTERNAL MEDICINE | Admitting: INTERNAL MEDICINE
Payer: COMMERCIAL

## 2019-07-24 ENCOUNTER — APPOINTMENT (OUTPATIENT)
Dept: MEDSURG UNIT | Facility: CLINIC | Age: 64
End: 2019-07-24
Attending: INTERNAL MEDICINE
Payer: COMMERCIAL

## 2019-07-24 ENCOUNTER — APPOINTMENT (OUTPATIENT)
Dept: LAB | Facility: CLINIC | Age: 64
End: 2019-07-24
Attending: INTERNAL MEDICINE
Payer: COMMERCIAL

## 2019-07-24 VITALS
DIASTOLIC BLOOD PRESSURE: 90 MMHG | OXYGEN SATURATION: 97 % | BODY MASS INDEX: 40.16 KG/M2 | SYSTOLIC BLOOD PRESSURE: 106 MMHG | TEMPERATURE: 98 F | WEIGHT: 265 LBS | RESPIRATION RATE: 18 BRPM | HEART RATE: 68 BPM | HEIGHT: 68 IN

## 2019-07-24 VITALS
OXYGEN SATURATION: 96 % | WEIGHT: 275.8 LBS | HEIGHT: 68 IN | SYSTOLIC BLOOD PRESSURE: 64 MMHG | BODY MASS INDEX: 41.8 KG/M2 | HEART RATE: 86 BPM

## 2019-07-24 DIAGNOSIS — I42.9 CARDIOMYOPATHY (H): ICD-10-CM

## 2019-07-24 DIAGNOSIS — I50.22 CHRONIC SYSTOLIC CONGESTIVE HEART FAILURE (H): Primary | ICD-10-CM

## 2019-07-24 DIAGNOSIS — I10 BENIGN ESSENTIAL HYPERTENSION: ICD-10-CM

## 2019-07-24 DIAGNOSIS — N18.30 CKD (CHRONIC KIDNEY DISEASE) STAGE 3, GFR 30-59 ML/MIN (H): ICD-10-CM

## 2019-07-24 DIAGNOSIS — Z95.811 LVAD (LEFT VENTRICULAR ASSIST DEVICE) PRESENT (H): ICD-10-CM

## 2019-07-24 DIAGNOSIS — Z79.01 LONG TERM CURRENT USE OF ANTICOAGULANT THERAPY: ICD-10-CM

## 2019-07-24 DIAGNOSIS — I50.22 CHRONIC SYSTOLIC CONGESTIVE HEART FAILURE (H): ICD-10-CM

## 2019-07-24 DIAGNOSIS — E66.01 MORBID OBESITY (H): ICD-10-CM

## 2019-07-24 LAB
ANION GAP SERPL CALCULATED.3IONS-SCNC: 7 MMOL/L (ref 3–14)
BUN SERPL-MCNC: 53 MG/DL (ref 7–30)
CALCIUM SERPL-MCNC: 8.8 MG/DL (ref 8.5–10.1)
CHLORIDE SERPL-SCNC: 97 MMOL/L (ref 94–109)
CO2 SERPL-SCNC: 31 MMOL/L (ref 20–32)
CREAT SERPL-MCNC: 1.4 MG/DL (ref 0.66–1.25)
ERYTHROCYTE [DISTWIDTH] IN BLOOD BY AUTOMATED COUNT: 16 % (ref 10–15)
GFR SERPL CREATININE-BSD FRML MDRD: 53 ML/MIN/{1.73_M2}
GLUCOSE SERPL-MCNC: 188 MG/DL (ref 70–99)
HCT VFR BLD AUTO: 38.9 % (ref 40–53)
HGB BLD-MCNC: 11.8 G/DL (ref 13.3–17.7)
INR PPP: 2.51 (ref 0.86–1.14)
LDH SERPL L TO P-CCNC: 386 U/L (ref 85–227)
MCH RBC QN AUTO: 26.1 PG (ref 26.5–33)
MCHC RBC AUTO-ENTMCNC: 30.3 G/DL (ref 31.5–36.5)
MCV RBC AUTO: 86 FL (ref 78–100)
PLATELET # BLD AUTO: 206 10E9/L (ref 150–450)
POTASSIUM SERPL-SCNC: 4.1 MMOL/L (ref 3.4–5.3)
RBC # BLD AUTO: 4.52 10E12/L (ref 4.4–5.9)
SODIUM SERPL-SCNC: 135 MMOL/L (ref 133–144)
WBC # BLD AUTO: 9.1 10E9/L (ref 4–11)

## 2019-07-24 PROCEDURE — 36415 COLL VENOUS BLD VENIPUNCTURE: CPT | Performed by: PHYSICIAN ASSISTANT

## 2019-07-24 PROCEDURE — 40000166 ZZH STATISTIC PP CARE STAGE 1

## 2019-07-24 PROCEDURE — 25000125 ZZHC RX 250: Performed by: INTERNAL MEDICINE

## 2019-07-24 PROCEDURE — 93306 TTE W/DOPPLER COMPLETE: CPT

## 2019-07-24 PROCEDURE — 85027 COMPLETE CBC AUTOMATED: CPT | Performed by: PHYSICIAN ASSISTANT

## 2019-07-24 PROCEDURE — 93451 RIGHT HEART CATH: CPT | Performed by: INTERNAL MEDICINE

## 2019-07-24 PROCEDURE — G0463 HOSPITAL OUTPT CLINIC VISIT: HCPCS | Mod: 25,ZF

## 2019-07-24 PROCEDURE — 27210788 ZZH MANIFOLD CR3: Performed by: INTERNAL MEDICINE

## 2019-07-24 PROCEDURE — 83615 LACTATE (LD) (LDH) ENZYME: CPT | Performed by: PHYSICIAN ASSISTANT

## 2019-07-24 PROCEDURE — 99214 OFFICE O/P EST MOD 30 MIN: CPT | Mod: 25 | Performed by: NURSE PRACTITIONER

## 2019-07-24 PROCEDURE — 93306 TTE W/DOPPLER COMPLETE: CPT | Mod: 26 | Performed by: INTERNAL MEDICINE

## 2019-07-24 PROCEDURE — C1894 INTRO/SHEATH, NON-LASER: HCPCS | Performed by: INTERNAL MEDICINE

## 2019-07-24 PROCEDURE — 85610 PROTHROMBIN TIME: CPT | Performed by: PHYSICIAN ASSISTANT

## 2019-07-24 PROCEDURE — 27210794 ZZH OR GENERAL SUPPLY STERILE: Performed by: INTERNAL MEDICINE

## 2019-07-24 PROCEDURE — 93750 INTERROGATION VAD IN PERSON: CPT | Mod: ZF | Performed by: NURSE PRACTITIONER

## 2019-07-24 PROCEDURE — 93451 RIGHT HEART CATH: CPT | Mod: 26 | Performed by: INTERNAL MEDICINE

## 2019-07-24 PROCEDURE — 80048 BASIC METABOLIC PNL TOTAL CA: CPT | Performed by: PHYSICIAN ASSISTANT

## 2019-07-24 RX ORDER — LIDOCAINE 40 MG/G
CREAM TOPICAL
Status: COMPLETED | OUTPATIENT
Start: 2019-07-24 | End: 2019-07-24

## 2019-07-24 RX ORDER — BUMETANIDE 1 MG/1
TABLET ORAL
Qty: 90 TABLET | Refills: 3 | Status: SHIPPED | OUTPATIENT
Start: 2019-07-24 | End: 2019-08-16

## 2019-07-24 RX ADMIN — LIDOCAINE: 40 CREAM TOPICAL at 09:09

## 2019-07-24 ASSESSMENT — MIFFLIN-ST. JEOR
SCORE: 2025.52
SCORE: 1976.53

## 2019-07-24 ASSESSMENT — PAIN SCALES - GENERAL: PAINLEVEL: NO PAIN (0)

## 2019-07-24 NOTE — LETTER
7/24/2019      RE: Jim Willingham  [address] [facility] [address]       Dear Colleague,    Thank you for the opportunity to participate in the care of your patient, Jim Willingham, at the [facility]. Please see a copy of my visit note below.    HPI: Jim Willingham is a 64-year-old gentleman with a past medical history of chronic kidney disease stage III, diabetes mellitus type 2, RV failure, CECILIA, obesity, hypertension, COPD, asthma, and nonischemic cardiomyopathy (EF 20%), status post HeartMate 2 LVAD placement on June 16, 2017, complicated by arrhythmias, WALTER, and left small pleural effusion.  He returns for routine follow up.       Jim has been feeling well over the past month.    He is able to walk a block before getting winded.  He is trying to exercise more and is putting in more steps daily.  His lightheadedness has improved.  His appetite is good.  He is drinking a liter daily on most days and up to 2 L for his limit.  He denies any PND, orthopnea, or lower extremity edema.  His weight is up 5 pounds.    His LVAD right-sided batteries are draining more quickly.  Otherwise he has no further equipment concerns.  He denies HA, neurological changes, hematuria, hematochezia, melena, epistaxis, fever, chills or any concerns for driveline infection.     PAST MEDICAL HISTORY:  Past Medical History:   Diagnosis Date     Benign essential hypertension 5/11/2017     Cardiomyopathy, unspecified (H) 5/8/2017     CKD (chronic kidney disease) stage 3, GFR 30-59 ml/min (H) 5/11/2017     Depression 5/11/2017     Diabetes mellitus (H) 5/11/2017     H/O gastric bypass 5/11/2017     ICD (implantable cardioverter-defibrillator), biventricular, in situ 5/11/2017     LVAD (left ventricular assist device) present (H)      NICM (nonischemic cardiomyopathy) (H)/ EF 20% 5/11/2017    ECHO: LVEDd. 7.66 cm, Restrictive pattern , Severe mitral valve regurgitation     CECILIA  (obstructive sleep apnea) 2017     Paroxysmal atrial fibrillation (H) 2017     Paroxysmal VT (H) 2017     Uncomplicated asthma      Vitamin B12 deficiency (non anemic) 2017       FAMILY HISTORY:  Family History   Problem Relation Age of Onset     Cerebrovascular Disease Mother      Diabetes Mother      Hypertension Mother      Coronary Artery Disease Father      Diabetes Type 2  Father        SOCIAL HISTORY:  Social History     Socioeconomic History     Marital status:      Spouse name: None     Number of children: None     Years of education: None     Highest education level: None   Occupational History     None   Social Needs     Financial resource strain: None     Food insecurity:     Worry: None     Inability: None     Transportation needs:     Medical: None     Non-medical: None   Tobacco Use     Smoking status: Former Smoker     Last attempt to quit:      Years since quittin.5     Smokeless tobacco: Never Used   Substance and Sexual Activity     Alcohol use: No     Drug use: No     Sexual activity: Yes     Partners: Female   Lifestyle     Physical activity:     Days per week: None     Minutes per session: None     Stress: None   Relationships     Social connections:     Talks on phone: None     Gets together: None     Attends Synagogue service: None     Active member of club or organization: None     Attends meetings of clubs or organizations: None     Relationship status: None     Intimate partner violence:     Fear of current or ex partner: None     Emotionally abused: None     Physically abused: None     Forced sexual activity: None   Other Topics Concern     Parent/sibling w/ CABG, MI or angioplasty before 65F 55M? No   Social History Narrative     None       CURRENT MEDICATIONS:  Current Outpatient Medications   Medication Sig Dispense Refill     acetaminophen (TYLENOL) 325 MG tablet Take 2 tablets (650 mg) by mouth every 6 hours 100 tablet      albuterol (ALBUTEROL)  108 (90 BASE) MCG/ACT Inhaler Inhale 2 puffs into the lungs every 4 hours as needed for shortness of breath / dyspnea or wheezing       allopurinol (ZYLOPRIM) 100 MG tablet Take 1 tablet (100 mg) by mouth daily 60 tablet 5     aspirin 81 MG chewable tablet 1 tablet (81 mg) by Oral or Feeding Tube route daily 36 tablet      bumetanide (BUMEX) 1 MG tablet Take 2 tablets (2 mg) by mouth 2 times daily 120 tablet 11     cefdinir (OMNICEF) 300 MG capsule Take 300 mg by mouth 2 times daily Per pt takes 300mg BID for 10 days.       citalopram (CELEXA) 20 MG tablet Take 20 mg by mouth daily       cyanocobalamin (VITAMIN B12) 1000 MCG/ML injection Inject 1,000 mcg into the muscle every 30 days       Dextromethorphan-guaiFENesin (MUCINEX DM)  MG 12 hr tablet Take 1 tablet by mouth every 12 hours Per pt takes one tablet every 12 hours.       fluticasone-vilanterol (BREO ELLIPTA) 200-25 MCG/INH oral inhaler Inhale 1 puff into the lungs daily       gabapentin (NEURONTIN) 300 MG capsule Take 1 capsule (300 mg) by mouth 3 times daily 90 capsule      ipratropium - albuterol 0.5 mg/2.5 mg/3 mL (DUONEB) 0.5-2.5 (3) MG/3ML neb solution Take 3 mLs by nebulization every 4 hours as needed for shortness of breath / dyspnea or wheezing       losartan (COZAAR) 25 MG tablet Take 1.5 tablets (37.5 mg) by mouth daily 135 tablet 3     metFORMIN (GLUCOPHAGE) 500 MG tablet Take 1 tablet (500 mg) by mouth 2 times daily (with meals) 60 tablet 0     metoprolol succinate (TOPROL-XL) 25 MG 24 hr tablet Take 50mg in the morning and 25mg in the evening 180 tablet 5     montelukast (SINGULAIR) 10 MG tablet Take 10 mg by mouth At Bedtime       pantoprazole (PROTONIX) 40 MG EC tablet Take 1 tablet (40 mg) by mouth every morning 60 tablet 5     potassium chloride (K-TAB,KLOR-CON) 10 MEQ tablet Take 2 tablets (20 mEq) by mouth daily 120 tablet 5     predniSONE (DELTASONE) 20 MG tablet Take 20 mg by mouth 2 times daily. (Patient taking differently: Take  "20 mg by mouth once ) 4 tablet 0     spironolactone (ALDACTONE) 25 MG tablet Take 1 tablet (25 mg) by mouth daily 30 tablet 5     traMADol (ULTRAM) 50 MG tablet Take 2 tablets (100 mg) by mouth every 6 hours as needed for moderate pain or breakthrough pain 28 tablet      umeclidinium (INCRUSE ELLIPTA) 62.5 MCG/INH oral inhaler Inhale 1 puff into the lungs daily       warfarin (COUMADIN) 3 MG tablet Use as directed. 30 tablet 0     warfarin (COUMADIN) 5 MG tablet Take 5 - 7.5mg daily or as directed by coumadin clinic. (Patient taking differently: Take 5 - 7.5mg daily or as directed by coumadin clinic.  Per pt takes 10mg on Monday and Friday and 7.5mg all the other days) 90 tablet 5     zinc sulfate (ZINCATE) 220 (50 ZN) MG capsule Take 220 mg by mouth 2 times daily       metolazone (ZAROXOLYN) 2.5 MG tablet Take 1 tablet (2.5 mg) by mouth once for 1 dose 1 tablet 0       ROS:  Constitutional: Denies fever, chills, or diaphoresis.  + Weight gain  Respiratory:  See HPI.     Cardiovascular: As per HPI.   GI: Denies nausea, vomiting, diarrhea, hematemesis, melena, or hematochezia.   : Denies urinary dysuria or hematuria.   Integument: Negative for bruising, rash, or pruritis.  Psychiatric: Negative for anxiety, depression, sleep disturbance, or mood changes.   + Depression   Neuro: Negative for headaches, syncope, numbness or tingling.   Endocrinology: + Elevated blood sugars  Musculoskeletal: Negative for gout or muscle weakness    EXAM:  BP (!) 64/0 (BP Location: Right arm, Patient Position: Chair, Cuff Size: Adult Large)   Pulse 86   Ht 1.727 m (5' 8\")   Wt 125.1 kg (275 lb 12.8 oz)   SpO2 96%   BMI 41.94 kg/m     General: alert, articulate, and in no acute distress.  HEENT: normocephalic, atraumatic, anicteric sclera, EOMI, normal palate, mucosa moist, no cyanosis.    Neck: neck supple.  JVP not appreciated  Heart: LVAD hum present, normal S1/S2, no murmur, gallop, rub.   Lungs: clear, no rales, ronchi, or " wheezing.  No accessory muscle use, respirations unlabored.   Abdomen: Distended, soft, non-tender, bowel sounds present, no hepatomegaly  Extremities: no clubbing, cyanosis or edema.  No palpable pedal pulses.  Neurological: alert and oriented x 3.  normal speech and affect, no gross motor deficits  Skin:  No ecchymoses, rashes, or clubbing.  Driveline wound clean dry and intact.  Weekly dressing in place.  No erythema or drainage.    Labs:  CBC RESULTS:  Lab Results   Component Value Date    WBC 9.1 07/24/2019    RBC 4.52 07/24/2019    HGB 11.8 (L) 07/24/2019    HCT 38.9 (L) 07/24/2019    MCV 86 07/24/2019    MCH 26.1 (L) 07/24/2019    MCHC 30.3 (L) 07/24/2019    RDW 16.0 (H) 07/24/2019     07/24/2019       CMP RESULTS:  Lab Results   Component Value Date     07/24/2019    POTASSIUM 4.1 07/24/2019    CHLORIDE 97 07/24/2019    CO2 31 07/24/2019    ANIONGAP 7 07/24/2019     (H) 07/24/2019    BUN 53 (H) 07/24/2019    CR 1.40 (H) 07/24/2019    GFRESTIMATED 53 (L) 07/24/2019    GFRESTBLACK 62 07/24/2019    QAMAR 8.8 07/24/2019    BILITOTAL 0.6 07/03/2019    ALBUMIN 3.7 07/03/2019    ALKPHOS 85 07/03/2019    ALT 35 07/03/2019    AST 22 07/03/2019        INR RESULTS:  Lab Results   Component Value Date    INR 2.51 (H) 07/24/2019       No components found for: CK  Lab Results   Component Value Date    MAG 2.1 06/30/2017     Lab Results   Component Value Date    NTBNP 1,109 (H) 07/03/2019     RHC:  7/24/19:  High normal right sided filling pressures with moderately reduced cardiac index suggestive of RV failure  Normal left sided filling pressure  High RA to PCWP suggestive of RV dysfunction  High normal PA pressure. No evidence of Pulmonary Hypertension    RA pressure mean of 10 mmHg, PA pressure mean of 19 mmHg, mean wedge pressure of 11 mmHg, Kee CO 4.62 L a minute, Kee CI 2.01 L/min/m .    Most recent echocardiogram 3/19:  Interpretation Summary  HM2 LVAD at 9400 RPM.  Severely dilated LV with  severely reduced global LV function, LVEF<20%.  LVIDd=6.1 cm.  Zeiwiw-uh-wgvnstupou dilated RV with moderately reduced global RV function.  The ventricular septum is midline. The aortic valve opens minimally with each  beat. There is mild AI.  LVAD inflow cannula is visualized in the LV apex. LVAD outflow graft is  visualized in the aorta. Normal Doppler interrogation of the LVAD inflow  cannula and outflow graft.     This study was compared with the study from 7/6/18 .  There has been no change.    Device check today:  Patient seen in the [facility] for evaluation and iterative programming of his Med[employer]onic Bi-Ventricular ICD per MD orders. Patient has an appointment to see Cate Freeman NP today. Normal ICD function.  Since 3/19/19, there have been 13 NSVT and 2 SVT episodes recorded. The NSVT last between 1-5 seconds with rates of 170-207 bpm.  The SVT lasted 1.5-5 minutes with rates of 158-162 bpm.  24 AT/AF detections, all < 1 minute in duration, with no stored egms for review.   Intrinsic rhythm = SR @ 80 bpm. AP = 2.6 %.  BVP =91.5%, VSR pace = 4.0%.   OptiVol fluid index is at baseline. Estimated battery longevity to MARVA = 2.7 years.  Battery voltage = 2.95V. No short v-v intervals recorded. Lead trends appear stable. Patient reports that he is feeling well and walking longer distances.   Plan for patient to return to clinic on 9/27/19 as scheduled.  GLADYS Nash RN.    Assessment and Plan:    Jim is a 64 year old gentleman with NICM who appears euvolemic today.  I decreased his Bumex back to 2 mg in the morning 1 mg in the afternoon due to worsening BUN and creatinine.  He will repeat a BMP in one week.    We had a long discussion about his weight today.  His BMI is currently 41.94.  His goal is to be listed for transplant.  I did enforce with him that he needs to have a goal weight of 255 pounds ideally. To be listed for transplant, he needs to have a BMI of 37 or less, and must be actively losing weight.    I  placed a referral for the weight loss clinic as well.    1.  Chronic systolic heart failure secondary to NICM  Stage D  NYHA Class IIIA  ACEi/ARB: Losartan 37.5 mg daily, defer increase due to hypotension   BB: Toprol-XL 50 mg in the morning and 25 mg in the evening. Defer increase due to hypotension  Aldosterone antagonist: Spironolactone 25 mg daily   SCD prophylaxis: ICD  Fluid status: euvolemic      2.  S/P LVAD implant as DT due to obesity  Anticoagulation: Warfarin with INR goal of 2-3.  Antiplatelet: Aspirin 81 mg daily  MAP: Low at 64.  Decreasing diuretics as outlined above  LDH: Stable at 386  VAD Interrogation today: VAD interrogation reviewed with VAD coordinator. Agree with findings.  Low voltage hazard alarm x2 yesterday.  Patient had kicked the AC plug out of the wall.  Batteries draining faster than normal.  Wave form sent to [employer].  No power spikes, speed drops, or other findings suspicious of pump malfunction noted.     3.  Morbid obesity: BMI 41.94.  Recommend weight loss with a goal weight of 255.  Weight loss clinic referral as previously outlined up above.    4.  CECILIA: Continue CPAP.      5.  Chronic kidney disease: BUN and creatinine more elevated today.  Creatinine 1.40.  Previously in the lower 1.2 range. Baseline creatinine in the past year 1.2-1.47. Decrease diuretics as outlined above.     6.  Follow-up:  Dr. Montgomery in September with a device check.  BMP in one week.      Cate DUMONT, CNP  Advanced Heart Failure/LVAD clinic

## 2019-07-24 NOTE — PATIENT INSTRUCTIONS
It was a pleasure to see you in clinic today.  Please do not hesitate to call with any questions or concerns.  We look forward to seeing you in clinic at your next device check in 2 months.    Michelle Nash, RN, BSN  Electrophysiology Nurse Clinician  [facility]    During Business Hours Please Call:  [phone]  After Hours Please Call:  [phone] - select option #4 and ask for job code [phone]

## 2019-07-24 NOTE — PATIENT INSTRUCTIONS
Medications:  1. Decrease Bumex to 2 mg in the morning and 1 mg in the afternoon    Follow-up:  1. Get labs in 1 week to check kidneys  2. Call the weight loss management clinic to schedule and appointment.  [phone].  3. Goal weight for transplant is 225 lb  4. See Dr. Montgomery in clinic as scheduled  5. Per your primary care MD, please schedule a neurology visit at the [facility] (ph: [phone])    Instructions:  1. Monitor closely which batteries seem to be discharging more quickly than others.   2. Follow up with the VAD Coordinator in the next two weeks to discuss if you have identified a pattern of which batteries are the problem. If it cannot be isolated to certain batteries, we may need to assess your controller.   3. Make sure none of your batteries are  at home (good for 3 years from manufacture date) and that none need to be recalibrated.     Page the VAD Coordinator on call if you gain more than 3 lb in a day or 5 in a week. Please also page if you feel unwell or have alarms.     Great to see you in clinic today. To Page the VAD Coordinator on call, dial [phone] option #4 and ask to speak to the VAD coordinator on call.

## 2019-07-24 NOTE — PROGRESS NOTES
Returned from [facility] post RH at 1030.  Patient tolerated recovery stage well. VSS, RIJV site clean/dry/intact, no hematoma, and denies pain. Patient tolerated PO  fluids. Teaching was done and discharge instructions were given.  Patient discharged from the hospital via ambulatory to home per self.

## 2019-07-24 NOTE — PROGRESS NOTES
Prepped for RH procedure.  Denies pain.  Has LVAD.  No one with him today.  H & P needs update.  Labs pending.  Needs consent.

## 2019-07-24 NOTE — DISCHARGE INSTRUCTIONS
[facility]                        Interventional Cardiology  Discharge Instructions   Post Right Heart Cath      AFTER YOU GO HOME:    DO drink plenty of fluids    DO resume your regular diet and medications unless otherwise instructed by your Primary Physician    Do Not scrub the procedure site vigorously    No lotion or powder to the puncture site for 3 days    CALL YOUR PRIMARY PHYSICIAN IF: You may resume all normal activity.  Monitor neck site for bleeding, swelling, or voice changes. If you notice bleeding or swelling immediately apply pressure to the site and call number below to speak with Cardiology Fellow.  If you experience any changes in your breathing you should call your doctor immediately or come to the closest Emergency Department.  Do not drive yourself.    ADDITIONAL INSTRUCTIONS: Medications: You are to resume all home medications including anticoagulation therapy unless otherwise advised by your primary cardiologist or nurse coordinator.    Follow Up: Per your primary cardiology team    If you have any questions or concerns regarding your procedure site please call [phone] at anytime and ask for Cardiology Fellow on call.  They are available 24 hours a day.  You may also contact the Cardiology Clinic after hours number at [phone].                                                       Telephone Numbers [phone] Monday-Friday 8:00 am to 4:30 pm    [phone] After 4:30 pm Monday-Friday, Weekends & Holidays  Ask for Interventional Cardiologist on call. Someone is on call 24 hours/day   [facility] toll free number [phone] Monday-Friday 8:00 am to 4:30 pm   [facility] Emergency Dept [phone]

## 2019-07-24 NOTE — PROGRESS NOTES
ANTICOAGULATION FOLLOW-UP CLINIC VISIT    Patient Name:  Jim Willingham  Date:  2019  Contact Type:  Telephone    SUBJECTIVE:  Patient Findings     Comments:   No Problems found. No Changes in Health, Medications, or diet. No Signs of bruising, bleeding or clotting.          Clinical Outcomes     Comments:   No Problems found. No Changes in Health, Medications, or diet. No Signs of bruising, bleeding or clotting.             OBJECTIVE    INR   Date Value Ref Range Status   2019 2.51 (H) 0.86 - 1.14 Final       ASSESSMENT / PLAN  INR assessment THER    Recheck INR In: 2 WEEKS    INR Location Clinic      Anticoagulation Summary  As of 2019    INR goal:   2.0-3.0   TTR:   81.6 % (2 y)   INR used for dosin.51 (2019)   Warfarin maintenance plan:   10 mg (5 mg x 2) every Mon, Wed, Fri; 7.5 mg (5 mg x 1.5) all other days   Full warfarin instructions:   10 mg every Mon, Wed, Fri; 7.5 mg all other days   Weekly warfarin total:   60 mg   Plan last modified:   Brendan Montoya RN (5/15/2019)   Next INR check:   2019   Priority:   INR   Target end date:       Indications    LVAD (left ventricular assist device) present (H) [Z95.811]  Long-term (current) use of anticoagulants [Z79.01] [Z79.01]             Anticoagulation Episode Summary     INR check location:       Preferred lab:       Send INR reminders to:   [facility]    Comments:   HIPPA Forms mailed 17  Labs drawn either at [facility] or [facility]  LVAD placed 17   II  ASA 81 mg daily      Anticoagulation Care Providers     Provider Role Specialty Phone number    Delisa Montgomery MD Responsible Cardiology [phone]            See the Encounter Report to view Anticoagulation Flowsheet and Dosing Calendar (Go to Encounters tab in chart review, and find the Anticoagulation Therapy Visit)    Spoke with patient. Gave them their lab results and new warfarin recommendation.  No changes in health, medication,  or diet. No missed doses, no falls. No signs or symptoms of bleed or clotting.  Patient had LVAD placed on:   6/19/17  Patient's current Aspirin dose: 81 mg ASA daily  LVAD Protocol followed: Yes.   If Not Followed Explanation:  N/A    Thank you,  Nery Rodriguez  2nd Year Pharmacy Student

## 2019-07-24 NOTE — IP AVS SNAPSHOT
[address] [address]                                    After Visit Summary   7/24/2019    Jim Willingham    MRN: [id number]           After Visit Summary Signature Page    I have received my discharge instructions, and my questions have been answered. I have discussed any challenges I see with this plan with the nurse or doctor.    ..........................................................................................................................................  Patient/Patient Representative Signature      ..........................................................................................................................................  Patient Representative Print Name and Relationship to Patient    ..................................................               ................................................  Date                                   Time    ..........................................................................................................................................  Reviewed by Signature/Title    ...................................................              ..............................................  Date                                               Time          22EPIC Rev 08/18

## 2019-07-24 NOTE — NURSING NOTE
Vitals completed successfully and medication reconciled.     Tori Cuadra, DALE  1:43 PM  Chief Complaint   Patient presents with     Follow Up     6 month VAD

## 2019-07-24 NOTE — NURSING NOTE
1). PUMP DATA  Primary controller serial number: [id number]     II:   Flow: 6.6 L/min,    Speed: 9400 RPMs,     PI: 5.1 ,  Power: 6.6 Manuel,      Primary controller   Back up battery: Patient use: EXPIRING IMMINENTLY REPLACE NOW. Emergency back up battery replaced.    Data downloaded: Yes   Equipment and driveline assessed for damage: Yes     Back up : Serial number: [id number]  Back up battery: Replace in: 7  Months  Programmed settings identical to the settings on the primary controller :Yes      Education complete: Yes   Charge the BACKUP controller s backup battery every 6 months  Perform a self test on BACKUP every 6 months  Change the MPU s batteries every 6 months:Yes  Have equipment serviced yearly (if applicable):Yes    2). ALARMS  Alarms reported by patient since last pump evaluation: Yes, LOW VOLTAGE hazard alarms x 2 yest, pt states he kicked AC plug out of wall.   Alarms or other finding noted during pump data history and alarm download: Yes.  Pt states he has some batteries that discharge faster than others. It seems to happen more on the black cable side. Cables appear intact. Pt reports that he recallibrates batteries regularly. They are not . He will jeannie and track which batteries are the issue. If it seems random, we will consider changing controller. Will send waveforms to [employer] for assessment as well. Plan to follow up with patient in the next two weeks.      Action Taken:  Reviewed data with patient: Yes      3). DRESSING CHANGE / DRIVELINE ASSESSMENT  Dressing change completed today: No  Appearance of Driveline site: c/d/i weekly dressing.No issues,per pt    Driveline stabilization: Method: [employer]  [ Teaching reinforced on need for stabilization of Driveline. ]    4).Pt. Education    D:  Pt education provided.  I:  Pt s with HeartMate educated on the following topics:  1. Reviewed Care of Batteries.  a. When to rotate.  b. When to calibrate.  c. When they  .  d. When to clean Contacts.  2. Reviewed MPU   a. When to change batteries.  3. Reviewed Backup Controller  a. When to charge.  b. When to do self-test.  4. Inspected pt. s wearables  5. Pt given a handout with a Maintenance schedule.    I: Pt s with HVAD educated on the following topics:  1. Reviewed when to get new batteries after 500 cycles.  2. Reviewed the functions of the scroll down button.  3. Inspected pt. s wearables.  4. Reviewed procedure for when to change batteries and pt given handout with instructions.  A:  Pt verbalized understanding.  P:  VAD Coordinator available for questions or concerns.  Will continue VAD education.

## 2019-07-24 NOTE — Clinical Note
dry, intact, no bleeding and no hematoma. 7fr sheath RIJ removed, manual pressure applied, hemostasis achieved, bandage placed.

## 2019-07-27 LAB
MDC_IDC_EPISODE_DTM: NORMAL
MDC_IDC_EPISODE_DURATION: 0 S
MDC_IDC_EPISODE_DURATION: 1 S
MDC_IDC_EPISODE_DURATION: 128 S
MDC_IDC_EPISODE_DURATION: 133 S
MDC_IDC_EPISODE_DURATION: 2 S
MDC_IDC_EPISODE_DURATION: 24 S
MDC_IDC_EPISODE_DURATION: 3 S
MDC_IDC_EPISODE_DURATION: 306 S
MDC_IDC_EPISODE_DURATION: 35 S
MDC_IDC_EPISODE_DURATION: 4806 S
MDC_IDC_EPISODE_DURATION: 5 S
MDC_IDC_EPISODE_DURATION: 59 S
MDC_IDC_EPISODE_DURATION: 86 S
MDC_IDC_EPISODE_DURATION: 88 S
MDC_IDC_EPISODE_DURATION: 97 S
MDC_IDC_EPISODE_ID: 252
MDC_IDC_EPISODE_ID: 253
MDC_IDC_EPISODE_ID: 254
MDC_IDC_EPISODE_ID: 255
MDC_IDC_EPISODE_ID: 256
MDC_IDC_EPISODE_ID: 257
MDC_IDC_EPISODE_ID: 258
MDC_IDC_EPISODE_ID: 259
MDC_IDC_EPISODE_ID: 260
MDC_IDC_EPISODE_ID: 261
MDC_IDC_EPISODE_ID: 262
MDC_IDC_EPISODE_ID: 263
MDC_IDC_EPISODE_ID: 264
MDC_IDC_EPISODE_ID: 265
MDC_IDC_EPISODE_ID: 266
MDC_IDC_EPISODE_ID: 8025
MDC_IDC_EPISODE_ID: 8388
MDC_IDC_EPISODE_ID: 8389
MDC_IDC_EPISODE_ID: 8390
MDC_IDC_EPISODE_ID: 8391
MDC_IDC_EPISODE_ID: 8392
MDC_IDC_EPISODE_ID: 8393
MDC_IDC_EPISODE_ID: 8394
MDC_IDC_EPISODE_TYPE: NORMAL
MDC_IDC_LEAD_IMPLANT_DT: NORMAL
MDC_IDC_LEAD_LOCATION: NORMAL
MDC_IDC_LEAD_LOCATION_DETAIL_1: NORMAL
MDC_IDC_LEAD_MFG: NORMAL
MDC_IDC_LEAD_MODEL: NORMAL
MDC_IDC_LEAD_POLARITY_TYPE: NORMAL
MDC_IDC_LEAD_SERIAL: NORMAL
MDC_IDC_MSMT_BATTERY_DTM: NORMAL
MDC_IDC_MSMT_BATTERY_REMAINING_LONGEVITY: 31 MO
MDC_IDC_MSMT_BATTERY_RRT_TRIGGER: 2.73
MDC_IDC_MSMT_BATTERY_STATUS: NORMAL
MDC_IDC_MSMT_BATTERY_VOLTAGE: 2.95 V
MDC_IDC_MSMT_CAP_CHARGE_DTM: NORMAL
MDC_IDC_MSMT_CAP_CHARGE_ENERGY: 18 J
MDC_IDC_MSMT_CAP_CHARGE_TIME: 4.2
MDC_IDC_MSMT_CAP_CHARGE_TYPE: NORMAL
MDC_IDC_MSMT_LEADCHNL_LV_IMPEDANCE_VALUE: 380 OHM
MDC_IDC_MSMT_LEADCHNL_LV_IMPEDANCE_VALUE: 4047 OHM
MDC_IDC_MSMT_LEADCHNL_LV_IMPEDANCE_VALUE: 4047 OHM
MDC_IDC_MSMT_LEADCHNL_LV_PACING_THRESHOLD_AMPLITUDE: 0.62 V
MDC_IDC_MSMT_LEADCHNL_LV_PACING_THRESHOLD_AMPLITUDE: 0.75 V
MDC_IDC_MSMT_LEADCHNL_LV_PACING_THRESHOLD_PULSEWIDTH: 0.4 MS
MDC_IDC_MSMT_LEADCHNL_LV_PACING_THRESHOLD_PULSEWIDTH: 0.4 MS
MDC_IDC_MSMT_LEADCHNL_RA_IMPEDANCE_VALUE: 399 OHM
MDC_IDC_MSMT_LEADCHNL_RA_PACING_THRESHOLD_AMPLITUDE: 1.25 V
MDC_IDC_MSMT_LEADCHNL_RA_PACING_THRESHOLD_AMPLITUDE: 1.5 V
MDC_IDC_MSMT_LEADCHNL_RA_PACING_THRESHOLD_PULSEWIDTH: 0.4 MS
MDC_IDC_MSMT_LEADCHNL_RA_PACING_THRESHOLD_PULSEWIDTH: 0.4 MS
MDC_IDC_MSMT_LEADCHNL_RA_SENSING_INTR_AMPL: 1.25 MV
MDC_IDC_MSMT_LEADCHNL_RA_SENSING_INTR_AMPL: 1.62 MV
MDC_IDC_MSMT_LEADCHNL_RV_IMPEDANCE_VALUE: 247 OHM
MDC_IDC_MSMT_LEADCHNL_RV_IMPEDANCE_VALUE: 342 OHM
MDC_IDC_MSMT_LEADCHNL_RV_PACING_THRESHOLD_AMPLITUDE: 0.88 V
MDC_IDC_MSMT_LEADCHNL_RV_PACING_THRESHOLD_AMPLITUDE: 1.25 V
MDC_IDC_MSMT_LEADCHNL_RV_PACING_THRESHOLD_PULSEWIDTH: 0.4 MS
MDC_IDC_MSMT_LEADCHNL_RV_PACING_THRESHOLD_PULSEWIDTH: 0.4 MS
MDC_IDC_MSMT_LEADCHNL_RV_SENSING_INTR_AMPL: 6.88 MV
MDC_IDC_MSMT_LEADCHNL_RV_SENSING_INTR_AMPL: 7.5 MV
MDC_IDC_PG_IMPLANT_DTM: NORMAL
MDC_IDC_PG_MFG: NORMAL
MDC_IDC_PG_MODEL: NORMAL
MDC_IDC_PG_SERIAL: NORMAL
MDC_IDC_PG_TYPE: NORMAL
MDC_IDC_SESS_CLINIC_NAME: NORMAL
MDC_IDC_SESS_DTM: NORMAL
MDC_IDC_SESS_TYPE: NORMAL
MDC_IDC_SET_BRADY_AT_MODE_SWITCH_RATE: 150 {BEATS}/MIN
MDC_IDC_SET_BRADY_LOWRATE: 50 {BEATS}/MIN
MDC_IDC_SET_BRADY_MAX_SENSOR_RATE: 130 {BEATS}/MIN
MDC_IDC_SET_BRADY_MAX_TRACKING_RATE: 130 {BEATS}/MIN
MDC_IDC_SET_BRADY_MODE: NORMAL
MDC_IDC_SET_BRADY_PAV_DELAY_LOW: 160 MS
MDC_IDC_SET_BRADY_SAV_DELAY_LOW: 130 MS
MDC_IDC_SET_CRT_PACED_CHAMBERS: NORMAL
MDC_IDC_SET_LEADCHNL_LV_PACING_AMPLITUDE: 1.75 V
MDC_IDC_SET_LEADCHNL_LV_PACING_ANODE_ELECTRODE_1: NORMAL
MDC_IDC_SET_LEADCHNL_LV_PACING_ANODE_LOCATION_1: NORMAL
MDC_IDC_SET_LEADCHNL_LV_PACING_CAPTURE_MODE: NORMAL
MDC_IDC_SET_LEADCHNL_LV_PACING_CATHODE_ELECTRODE_1: NORMAL
MDC_IDC_SET_LEADCHNL_LV_PACING_CATHODE_LOCATION_1: NORMAL
MDC_IDC_SET_LEADCHNL_LV_PACING_POLARITY: NORMAL
MDC_IDC_SET_LEADCHNL_LV_PACING_PULSEWIDTH: 0.4 MS
MDC_IDC_SET_LEADCHNL_RA_PACING_AMPLITUDE: 2.75 V
MDC_IDC_SET_LEADCHNL_RA_PACING_ANODE_ELECTRODE_1: NORMAL
MDC_IDC_SET_LEADCHNL_RA_PACING_ANODE_LOCATION_1: NORMAL
MDC_IDC_SET_LEADCHNL_RA_PACING_CAPTURE_MODE: NORMAL
MDC_IDC_SET_LEADCHNL_RA_PACING_CATHODE_ELECTRODE_1: NORMAL
MDC_IDC_SET_LEADCHNL_RA_PACING_CATHODE_LOCATION_1: NORMAL
MDC_IDC_SET_LEADCHNL_RA_PACING_POLARITY: NORMAL
MDC_IDC_SET_LEADCHNL_RA_PACING_PULSEWIDTH: 0.4 MS
MDC_IDC_SET_LEADCHNL_RA_SENSING_ANODE_ELECTRODE_1: NORMAL
MDC_IDC_SET_LEADCHNL_RA_SENSING_ANODE_LOCATION_1: NORMAL
MDC_IDC_SET_LEADCHNL_RA_SENSING_CATHODE_ELECTRODE_1: NORMAL
MDC_IDC_SET_LEADCHNL_RA_SENSING_CATHODE_LOCATION_1: NORMAL
MDC_IDC_SET_LEADCHNL_RA_SENSING_POLARITY: NORMAL
MDC_IDC_SET_LEADCHNL_RA_SENSING_SENSITIVITY: 0.45 MV
MDC_IDC_SET_LEADCHNL_RV_PACING_AMPLITUDE: 2 V
MDC_IDC_SET_LEADCHNL_RV_PACING_ANODE_ELECTRODE_1: NORMAL
MDC_IDC_SET_LEADCHNL_RV_PACING_ANODE_LOCATION_1: NORMAL
MDC_IDC_SET_LEADCHNL_RV_PACING_CAPTURE_MODE: NORMAL
MDC_IDC_SET_LEADCHNL_RV_PACING_CATHODE_ELECTRODE_1: NORMAL
MDC_IDC_SET_LEADCHNL_RV_PACING_CATHODE_LOCATION_1: NORMAL
MDC_IDC_SET_LEADCHNL_RV_PACING_POLARITY: NORMAL
MDC_IDC_SET_LEADCHNL_RV_PACING_PULSEWIDTH: 0.4 MS
MDC_IDC_SET_LEADCHNL_RV_SENSING_ANODE_ELECTRODE_1: NORMAL
MDC_IDC_SET_LEADCHNL_RV_SENSING_ANODE_LOCATION_1: NORMAL
MDC_IDC_SET_LEADCHNL_RV_SENSING_CATHODE_ELECTRODE_1: NORMAL
MDC_IDC_SET_LEADCHNL_RV_SENSING_CATHODE_LOCATION_1: NORMAL
MDC_IDC_SET_LEADCHNL_RV_SENSING_POLARITY: NORMAL
MDC_IDC_SET_LEADCHNL_RV_SENSING_SENSITIVITY: 0.3 MV
MDC_IDC_SET_ZONE_DETECTION_BEATS_DENOMINATOR: 40 {BEATS}
MDC_IDC_SET_ZONE_DETECTION_BEATS_NUMERATOR: 30 {BEATS}
MDC_IDC_SET_ZONE_DETECTION_INTERVAL: 240 MS
MDC_IDC_SET_ZONE_DETECTION_INTERVAL: 320 MS
MDC_IDC_SET_ZONE_DETECTION_INTERVAL: 360 MS
MDC_IDC_SET_ZONE_DETECTION_INTERVAL: 400 MS
MDC_IDC_SET_ZONE_DETECTION_INTERVAL: 400 MS
MDC_IDC_SET_ZONE_TYPE: NORMAL
MDC_IDC_STAT_AT_BURDEN_PERCENT: 0 %
MDC_IDC_STAT_AT_DTM_END: NORMAL
MDC_IDC_STAT_AT_DTM_START: NORMAL
MDC_IDC_STAT_BRADY_AP_VP_PERCENT: 2.63 %
MDC_IDC_STAT_BRADY_AP_VS_PERCENT: 0.06 %
MDC_IDC_STAT_BRADY_AS_VP_PERCENT: 93.02 %
MDC_IDC_STAT_BRADY_AS_VS_PERCENT: 4.29 %
MDC_IDC_STAT_BRADY_DTM_END: NORMAL
MDC_IDC_STAT_BRADY_DTM_START: NORMAL
MDC_IDC_STAT_BRADY_RA_PERCENT_PACED: 2.64 %
MDC_IDC_STAT_BRADY_RV_PERCENT_PACED: 1 %
MDC_IDC_STAT_CRT_DTM_END: NORMAL
MDC_IDC_STAT_CRT_DTM_START: NORMAL
MDC_IDC_STAT_CRT_LV_PERCENT_PACED: 91.55 %
MDC_IDC_STAT_CRT_PERCENT_PACED: 91.55 %
MDC_IDC_STAT_EPISODE_RECENT_COUNT: 0
MDC_IDC_STAT_EPISODE_RECENT_COUNT: 13
MDC_IDC_STAT_EPISODE_RECENT_COUNT_DTM_END: NORMAL
MDC_IDC_STAT_EPISODE_RECENT_COUNT_DTM_START: NORMAL
MDC_IDC_STAT_EPISODE_TOTAL_COUNT: 0
MDC_IDC_STAT_EPISODE_TOTAL_COUNT: 1
MDC_IDC_STAT_EPISODE_TOTAL_COUNT: 12
MDC_IDC_STAT_EPISODE_TOTAL_COUNT: 14
MDC_IDC_STAT_EPISODE_TOTAL_COUNT: 158
MDC_IDC_STAT_EPISODE_TOTAL_COUNT_DTM_END: NORMAL
MDC_IDC_STAT_EPISODE_TOTAL_COUNT_DTM_START: NORMAL
MDC_IDC_STAT_EPISODE_TYPE: NORMAL
MDC_IDC_STAT_TACHYTHERAPY_ATP_DELIVERED_RECENT: 0
MDC_IDC_STAT_TACHYTHERAPY_ATP_DELIVERED_TOTAL: 13
MDC_IDC_STAT_TACHYTHERAPY_RECENT_DTM_END: NORMAL
MDC_IDC_STAT_TACHYTHERAPY_RECENT_DTM_START: NORMAL
MDC_IDC_STAT_TACHYTHERAPY_SHOCKS_ABORTED_RECENT: 0
MDC_IDC_STAT_TACHYTHERAPY_SHOCKS_ABORTED_TOTAL: 1
MDC_IDC_STAT_TACHYTHERAPY_SHOCKS_DELIVERED_RECENT: 0
MDC_IDC_STAT_TACHYTHERAPY_SHOCKS_DELIVERED_TOTAL: 2
MDC_IDC_STAT_TACHYTHERAPY_TOTAL_DTM_END: NORMAL
MDC_IDC_STAT_TACHYTHERAPY_TOTAL_DTM_START: NORMAL

## 2019-07-31 ENCOUNTER — ANTICOAGULATION THERAPY VISIT (OUTPATIENT)
Dept: ANTICOAGULATION | Facility: CLINIC | Age: 64
End: 2019-07-31

## 2019-07-31 ENCOUNTER — CARE COORDINATION (OUTPATIENT)
Dept: CARDIOLOGY | Facility: CLINIC | Age: 64
End: 2019-07-31

## 2019-07-31 DIAGNOSIS — I50.22 CHRONIC SYSTOLIC CONGESTIVE HEART FAILURE (H): ICD-10-CM

## 2019-07-31 DIAGNOSIS — Z79.01 LONG TERM CURRENT USE OF ANTICOAGULANT THERAPY: ICD-10-CM

## 2019-07-31 DIAGNOSIS — Z95.811 LVAD (LEFT VENTRICULAR ASSIST DEVICE) PRESENT (H): ICD-10-CM

## 2019-07-31 LAB
ALBUMIN SERPL-MCNC: 3.9 G/DL (ref 3.4–5)
ALP SERPL-CCNC: 94 U/L (ref 40–150)
ALT SERPL W P-5'-P-CCNC: 37 U/L (ref 0–70)
ANION GAP SERPL CALCULATED.3IONS-SCNC: 6 MMOL/L (ref 3–14)
AST SERPL W P-5'-P-CCNC: 29 U/L (ref 0–45)
BASOPHILS # BLD AUTO: 0.1 10E9/L (ref 0–0.2)
BASOPHILS NFR BLD AUTO: 0.5 %
BILIRUB SERPL-MCNC: 0.4 MG/DL (ref 0.2–1.3)
BUN SERPL-MCNC: 45 MG/DL (ref 7–30)
CALCIUM SERPL-MCNC: 8.4 MG/DL (ref 8.5–10.1)
CHLORIDE SERPL-SCNC: 102 MMOL/L (ref 94–109)
CO2 SERPL-SCNC: 30 MMOL/L (ref 20–32)
CREAT SERPL-MCNC: 1.58 MG/DL (ref 0.66–1.25)
DIFFERENTIAL METHOD BLD: ABNORMAL
EOSINOPHIL # BLD AUTO: 0 10E9/L (ref 0–0.7)
EOSINOPHIL NFR BLD AUTO: 0.4 %
ERYTHROCYTE [DISTWIDTH] IN BLOOD BY AUTOMATED COUNT: 15.9 % (ref 10–15)
FERRITIN SERPL-MCNC: 41 NG/ML (ref 26–388)
GFR SERPL CREATININE-BSD FRML MDRD: 46 ML/MIN/{1.73_M2}
GLUCOSE SERPL-MCNC: 93 MG/DL (ref 70–99)
HCT VFR BLD AUTO: 38.3 % (ref 40–53)
HGB BLD-MCNC: 11.9 G/DL (ref 13.3–17.7)
IMM GRANULOCYTES # BLD: 0.1 10E9/L (ref 0–0.4)
IMM GRANULOCYTES NFR BLD: 0.5 %
INR PPP: 2.78 (ref 0.86–1.14)
IRON SATN MFR SERPL: 11 % (ref 15–46)
IRON SERPL-MCNC: 40 UG/DL (ref 35–180)
LDH SERPL L TO P-CCNC: 400 U/L (ref 85–227)
LYMPHOCYTES # BLD AUTO: 2.2 10E9/L (ref 0.8–5.3)
LYMPHOCYTES NFR BLD AUTO: 23.4 %
MCH RBC QN AUTO: 26.6 PG (ref 26.5–33)
MCHC RBC AUTO-ENTMCNC: 31.1 G/DL (ref 31.5–36.5)
MCV RBC AUTO: 86 FL (ref 78–100)
MONOCYTES # BLD AUTO: 0.7 10E9/L (ref 0–1.3)
MONOCYTES NFR BLD AUTO: 7.6 %
NEUTROPHILS # BLD AUTO: 6.4 10E9/L (ref 1.6–8.3)
NEUTROPHILS NFR BLD AUTO: 67.6 %
NT-PROBNP SERPL-MCNC: 866 PG/ML (ref 0–125)
PLATELET # BLD AUTO: 226 10E9/L (ref 150–450)
POTASSIUM SERPL-SCNC: 4.1 MMOL/L (ref 3.4–5.3)
PROT SERPL-MCNC: 7 G/DL (ref 6.8–8.8)
RBC # BLD AUTO: 4.48 10E12/L (ref 4.4–5.9)
SODIUM SERPL-SCNC: 138 MMOL/L (ref 133–144)
TIBC SERPL-MCNC: 367 UG/DL (ref 240–430)
TSH SERPL DL<=0.005 MIU/L-ACNC: 1.23 MU/L (ref 0.4–4)
URATE SERPL-MCNC: 9 MG/DL (ref 3.5–7.2)
VIT B12 SERPL-MCNC: 410 PG/ML (ref 193–986)
WBC # BLD AUTO: 9.4 10E9/L (ref 4–11)

## 2019-07-31 PROCEDURE — 83615 LACTATE (LD) (LDH) ENZYME: CPT | Performed by: INTERNAL MEDICINE

## 2019-07-31 PROCEDURE — 83880 ASSAY OF NATRIURETIC PEPTIDE: CPT | Performed by: INTERNAL MEDICINE

## 2019-07-31 PROCEDURE — 85610 PROTHROMBIN TIME: CPT | Performed by: INTERNAL MEDICINE

## 2019-07-31 PROCEDURE — 80053 COMPREHEN METABOLIC PANEL: CPT | Performed by: INTERNAL MEDICINE

## 2019-07-31 PROCEDURE — 85025 COMPLETE CBC W/AUTO DIFF WBC: CPT | Performed by: INTERNAL MEDICINE

## 2019-07-31 PROCEDURE — 83540 ASSAY OF IRON: CPT | Performed by: INTERNAL MEDICINE

## 2019-07-31 PROCEDURE — 82728 ASSAY OF FERRITIN: CPT | Performed by: INTERNAL MEDICINE

## 2019-07-31 PROCEDURE — 36415 COLL VENOUS BLD VENIPUNCTURE: CPT | Performed by: INTERNAL MEDICINE

## 2019-07-31 PROCEDURE — 84550 ASSAY OF BLOOD/URIC ACID: CPT | Performed by: INTERNAL MEDICINE

## 2019-07-31 PROCEDURE — 82607 VITAMIN B-12: CPT | Performed by: INTERNAL MEDICINE

## 2019-07-31 PROCEDURE — 84443 ASSAY THYROID STIM HORMONE: CPT | Performed by: INTERNAL MEDICINE

## 2019-07-31 PROCEDURE — 84466 ASSAY OF TRANSFERRIN: CPT | Performed by: INTERNAL MEDICINE

## 2019-07-31 NOTE — PROGRESS NOTES
Called patient to check in after his clinic appt one week ago. Pt is getting a BMP today. Patient stated that in the last week he has gained 1/2 a pound. VAD numbers stable.     This writer also asked about his concern that he brought up in clinic last week (see nursing note) regarding his batteries discharging faster than normal . Patient stated that he noticed whichever battery is connected to the controller's black power cord discharges faster than the one connected to the white power cord. Patient stated that he checked all of his batteries and none of them are  yet.  Patient stated that he also switched his clips out and is currently using the ones that were in his backup bag.  Therefore, the issue is not isolated to certain batteries or clips.  It is likely a controller issue, specially the black power cable.  This writer recommended that patient comes to clinic today.  Patient stated that he cannot come today or this week.  Patient stated that he would call the VAD  and make an appointment for the next week. At the appointment, VAD Coordinator will assess controller and likely change controller. Instructed patient to call VAD Coordinator on-call with any concerns or alarms in the meantime. Also reinforced the importance of keeping his backup bag within arm's distance at all times. Pt verbalized understanding.

## 2019-07-31 NOTE — PROGRESS NOTES
Addendum 19 Patient will have his INR done tomorrow. [facility]          ANTICOAGULATION FOLLOW-UP CLINIC VISIT    Patient Name:  Jim Willingham  Date:  2019  Contact Type:  Telephone    SUBJECTIVE:         OBJECTIVE    INR   Date Value Ref Range Status   2019 2.78 (H) 0.86 - 1.14 Final       ASSESSMENT / PLAN  INR assessment THER    Recheck INR In: 2 WEEKS    INR Location Clinic      Anticoagulation Summary  As of 2019    INR goal:   2.0-3.0   TTR:   81.8 % (2.1 y)   INR used for dosin.78 (2019)   Warfarin maintenance plan:   10 mg (5 mg x 2) every Mon, Wed, Fri; 7.5 mg (5 mg x 1.5) all other days   Full warfarin instructions:   10 mg every Mon, Wed, Fri; 7.5 mg all other days   Weekly warfarin total:   60 mg   No change documented:   Brendan Montoya RN   Plan last modified:   Brendan Montoya RN (5/15/2019)   Next INR check:   2019   Priority:   INR   Target end date:       Indications    LVAD (left ventricular assist device) present (H) [Z95.811]  Long-term (current) use of anticoagulants [Z79.01] [Z79.01]             Anticoagulation Episode Summary     INR check location:       Preferred lab:       Send INR reminders to:   [facility]    Comments:   HIPPA Forms mailed 17  Labs drawn either at [facility] or [facility]  LVAD placed 17   II  ASA 81 mg daily      Anticoagulation Care Providers     Provider Role Specialty Phone number    Delisa Montgomery MD [facility] Cardiology [phone]            See the Encounter Report to view Anticoagulation Flowsheet and Dosing Calendar (Go to Encounters tab in chart review, and find the Anticoagulation Therapy Visit)    Spoke to Jim.    INR/CFX/F2 RESULT:INR result is 2.78    ASSESSMENT:No Problems found. No Changes in Health, Medications, or diet. No Signs of bruising, bleeding or clotting. Jim reports that an adjustment has been made to Bumex.  Takes 2mg in AM, 1mg in PM.  Team discussed patient  may be dehydrated.    DOSING ADJUSTMENT:Continue current maintenance dose    NEXT INR/FACTOR X OR FACTOR II:8/14/19    PROTOCOL FOLLOWED:LVAD    Patient had LVAD placed on:   6/19/17  Patient's current Aspirin dose: 81mg  LVAD Protocol followed:  Yes.   If Not Followed Explanation:  Brendan Garcia RN

## 2019-08-01 LAB — TRANSFERRIN SERPL-MCNC: 290 MG/DL (ref 210–360)

## 2019-08-08 ENCOUNTER — APPOINTMENT (OUTPATIENT)
Dept: CARDIOLOGY | Facility: CLINIC | Age: 64
End: 2019-08-08
Payer: COMMERCIAL

## 2019-08-08 ENCOUNTER — ALLIED HEALTH/NURSE VISIT (OUTPATIENT)
Dept: CARDIOLOGY | Facility: CLINIC | Age: 64
End: 2019-08-08
Attending: INTERNAL MEDICINE
Payer: COMMERCIAL

## 2019-08-08 DIAGNOSIS — I50.22 CHRONIC SYSTOLIC HEART FAILURE (H): Primary | ICD-10-CM

## 2019-08-08 NOTE — NURSING NOTE
1). PUMP DATA  Primary controller serial number: [id number]    HM II:   Flow: 4.6 L/min,    Speed: 9400 RPMs,     PI: 6.5 ,  Power: 5.6 Manuel,      Primary controller   Back up battery: Patient use: 62, Replace in: 31  Months     Data downloaded: Yes   Equipment and driveline assessed for damage: Yes     Back up : Serial number: [id number]  Back up battery: Patient use: 5 Replace in: 6  Months  Programmed settings identical to the settings on the primary controller :Yes    Per protocol, issued patient a new back up battery SN [id number].     During this visit, changed patient's controller because patient's battery connected to his black power cord would drain at a must faster rate than the white power cord. Patient's batteries are not  yet. He tried cleaning all gold contact strips on batteries and clip and calibrated batteries. He also had switched out his clips and was using the ones the were in his backup bag. Therefore, it seems to be a controller problem. This writer switched his controller so the controller that was his backup controller is now his primary controller and his old primary controller is now his backup. Instructed patient to call VAD Coordinator in one week to report if switching his controller resolved the problem.  If it did resolve the problem, then VAD Coordinator will issue patient a new backup controller. Instructed patient to call VAD Coordinator on-call if he has any alarms or concerns in the meantime; pt verbalized understanding.   Current primary controller SN: [id number]  Current backup controller SN: [id number]    2). ALARMS  Alarms reported by patient since last pump evaluation: Yes, low voltage advisory. Patient reports that he promptly changes batteries.  Alarms or other finding noted during pump data history and alarm download: Yes, Occasional to frequent PI events with no speed drops noted.    Action Taken:  Reviewed data with patient: Yes

## 2019-08-11 NOTE — NURSING NOTE
Chief Complaint   Patient presents with     Follow Up For     VAD FU 6mos     Vitals were taken and medications were reconciled.     Luis E Hernandez, RMA  2:40PM     [place]our[place]

## 2019-08-15 ENCOUNTER — ANTICOAGULATION THERAPY VISIT (OUTPATIENT)
Dept: ANTICOAGULATION | Facility: CLINIC | Age: 64
End: 2019-08-15

## 2019-08-15 DIAGNOSIS — Z95.811 LVAD (LEFT VENTRICULAR ASSIST DEVICE) PRESENT (H): ICD-10-CM

## 2019-08-15 DIAGNOSIS — Z79.01 LONG TERM CURRENT USE OF ANTICOAGULANT THERAPY: ICD-10-CM

## 2019-08-15 LAB — INR PPP: 2.16 (ref 0.86–1.14)

## 2019-08-15 PROCEDURE — 85610 PROTHROMBIN TIME: CPT | Performed by: INTERNAL MEDICINE

## 2019-08-15 PROCEDURE — 36415 COLL VENOUS BLD VENIPUNCTURE: CPT | Performed by: INTERNAL MEDICINE

## 2019-08-15 NOTE — PROGRESS NOTES
ANTICOAGULATION FOLLOW-UP CLINIC VISIT    Patient Name:  Jim Willingham  Date:  8/15/2019  Contact Type:  Telephone    SUBJECTIVE:         OBJECTIVE    INR   Date Value Ref Range Status   08/15/2019 2.16 (H) 0.86 - 1.14 Final       ASSESSMENT / PLAN  INR assessment THER    Recheck INR In: 2 WEEKS    INR Location Clinic      Anticoagulation Summary  As of 8/15/2019    INR goal:   2.0-3.0   TTR:   82.2 % (2.1 y)   INR used for dosin.16 (8/15/2019)   Warfarin maintenance plan:   10 mg (5 mg x 2) every Mon, Wed, Fri; 7.5 mg (5 mg x 1.5) all other days   Full warfarin instructions:   10 mg every Mon, Wed, Fri; 7.5 mg all other days   Weekly warfarin total:   60 mg   No change documented:   Brendan Montoya RN   Plan last modified:   Brendan Montoya RN (5/15/2019)   Next INR check:   2019   Priority:   INR   Target end date:       Indications    LVAD (left ventricular assist device) present (H) [Z95.811]  Long-term (current) use of anticoagulants [Z79.01] [Z79.01]             Anticoagulation Episode Summary     INR check location:       Preferred lab:       Send INR reminders to:   [facility]    Comments:   HIPPA Forms mailed 17  Labs drawn either at [facility] or [facility]  LVAD placed 17   II  ASA 81 mg daily      Anticoagulation Care Providers     Provider Role Specialty Phone number    UlisesDelisa travis MD [facility] Cardiology [phone]            See the Encounter Report to view Anticoagulation Flowsheet and Dosing Calendar (Go to Encounters tab in chart review, and find the Anticoagulation Therapy Visit)    INR/CFX/F2 RESULT:INR result is 2.16 on 8/15    ASSESSMENT:No Problems found. No Changes in Health, Medications, or diet. No Signs of bruising, bleeding or clotting.    DOSING ADJUSTMENT: Continue current maintenance dose    NEXT INR/FACTOR X OR FACTOR II: 2 weeks    PROTOCOL FOLLOWED: LVAD  Patient had LVAD placed on:   17  Patient's current Aspirin  dose: 81mg  LVAD Protocol followed:  Yes.   If Not Followed Explanation:  Brendan Garcia RN

## 2019-08-16 ENCOUNTER — CARE COORDINATION (OUTPATIENT)
Dept: CARDIOLOGY | Facility: CLINIC | Age: 64
End: 2019-08-16

## 2019-08-16 ENCOUNTER — APPOINTMENT (OUTPATIENT)
Dept: CARDIOLOGY | Facility: CLINIC | Age: 64
End: 2019-08-16
Payer: COMMERCIAL

## 2019-08-16 DIAGNOSIS — I50.22 CHRONIC SYSTOLIC HEART FAILURE (H): Primary | ICD-10-CM

## 2019-08-16 DIAGNOSIS — Z95.811 LVAD (LEFT VENTRICULAR ASSIST DEVICE) PRESENT (H): ICD-10-CM

## 2019-08-16 RX ORDER — FERROUS SULFATE 325(65) MG
325 TABLET, DELAYED RELEASE (ENTERIC COATED) ORAL DAILY
Qty: 30 TABLET | Refills: 11 | Status: SHIPPED | OUTPATIENT
Start: 2019-08-16 | End: 2019-09-27

## 2019-08-16 RX ORDER — BUMETANIDE 1 MG/1
2 TABLET ORAL 2 TIMES DAILY
Qty: 120 TABLET | Refills: 11 | Status: ON HOLD | OUTPATIENT
Start: 2019-08-16 | End: 2020-01-24

## 2019-08-16 NOTE — PROGRESS NOTES
Patient called VAD Coordinator and stated that after changing his controller in clinic on 8/8/19, patient's batteries have been draining at the same rate. Therefore, that controller was not working properly.  For patient safety, patient should have a completely functional backup controller. This writer is sending patient a new controller that will be his new backup controller.  SN: [id number].  Backup battery installed. Use: 4 times; replace in 34 months. Speed set to 9400rpms and low speed limit set to 9000rpms.   Instructed patient to call VAD Coordinator when he receives new backup controller in the mail. Pt verbalized understanding.

## 2019-08-16 NOTE — PROGRESS NOTES
Pt got a repeat BMP after his last clinic appt. Creatinine increased from 1.4 to 1.58. Pt reported that he gained about 2-3 pounds over the last few weeks but denied SOB or swelling. VAD numbers stable.  Discussed patient with BARRIE Esposito. Per Cate, instructed patient to increase Bumex back to 2mg BID.    Patient also asked this writer about iron replacement.  Patient stated that over the past month or so, he has felt slightly more fatigued.  Patient stated that last year he was feeling this way and iron transfusions helped. Discussed with BARRIE Esposito. Per Cate, instructed patient to start Ferrous Sulfate 325mg daily PO and to repeat iron study labs in about 3 months.   Patient verbalized understanding of medication changes.  Instructed patient to call VAD Coordinator on-call with any questions, concerns or if he feels unwell; verbalized understanding.

## 2019-08-28 ENCOUNTER — ANTICOAGULATION THERAPY VISIT (OUTPATIENT)
Dept: ANTICOAGULATION | Facility: CLINIC | Age: 64
End: 2019-08-28

## 2019-08-28 DIAGNOSIS — I50.22 CHRONIC SYSTOLIC HEART FAILURE (H): ICD-10-CM

## 2019-08-28 DIAGNOSIS — Z79.01 LONG TERM CURRENT USE OF ANTICOAGULANT THERAPY: ICD-10-CM

## 2019-08-28 DIAGNOSIS — Z95.811 LVAD (LEFT VENTRICULAR ASSIST DEVICE) PRESENT (H): ICD-10-CM

## 2019-08-28 LAB
ANION GAP SERPL CALCULATED.3IONS-SCNC: 9 MMOL/L (ref 3–14)
BUN SERPL-MCNC: 38 MG/DL (ref 7–30)
CALCIUM SERPL-MCNC: 9.3 MG/DL (ref 8.5–10.1)
CHLORIDE SERPL-SCNC: 98 MMOL/L (ref 94–109)
CO2 SERPL-SCNC: 30 MMOL/L (ref 20–32)
CREAT SERPL-MCNC: 1.38 MG/DL (ref 0.66–1.25)
GFR SERPL CREATININE-BSD FRML MDRD: 54 ML/MIN/{1.73_M2}
GLUCOSE SERPL-MCNC: 138 MG/DL (ref 70–99)
INR PPP: 1.82 (ref 0.86–1.14)
POTASSIUM SERPL-SCNC: 4.6 MMOL/L (ref 3.4–5.3)
SODIUM SERPL-SCNC: 137 MMOL/L (ref 133–144)

## 2019-08-28 PROCEDURE — 36415 COLL VENOUS BLD VENIPUNCTURE: CPT | Performed by: INTERNAL MEDICINE

## 2019-08-28 PROCEDURE — 80048 BASIC METABOLIC PNL TOTAL CA: CPT | Performed by: INTERNAL MEDICINE

## 2019-08-28 PROCEDURE — 85610 PROTHROMBIN TIME: CPT | Performed by: INTERNAL MEDICINE

## 2019-08-28 NOTE — PROGRESS NOTES
ANTICOAGULATION FOLLOW-UP CLINIC VISIT    Patient Name:  Jim Willingham  Date:  2019  Contact Type:  Telephone    SUBJECTIVE:  Patient Findings     Comments:   Instructions for pt to increase ASA to 325mg Daily         Clinical Outcomes     Comments:   Instructions for pt to increase ASA to 325mg Daily            OBJECTIVE    INR   Date Value Ref Range Status   2019 1.82 (H) 0.86 - 1.14 Final       ASSESSMENT / PLAN  INR assessment SUB    Recheck INR In: 2 DAYS    INR Location Clinic      Anticoagulation Summary  As of 2019    INR goal:   2.0-3.0   TTR:   81.6 % (2.1 y)   INR used for dosin.82! (2019)   Warfarin maintenance plan:   10 mg (5 mg x 2) every Mon, Wed, Fri; 7.5 mg (5 mg x 1.5) all other days   Full warfarin instructions:   : 12.5 mg; : 10 mg; Otherwise 10 mg every Mon, Wed, Fri; 7.5 mg all other days   Weekly warfarin total:   60 mg   Plan last modified:   Brendan Montoya RN (5/15/2019)   Next INR check:   2019   Priority:   INR   Target end date:       Indications    LVAD (left ventricular assist device) present (H) [Z95.811]  Long-term (current) use of anticoagulants [Z79.01] [Z79.01]             Anticoagulation Episode Summary     INR check location:       Preferred lab:       Send INR reminders to:   [facility]    Comments:   HIPPA Forms mailed 17  Labs drawn either at [facility] or [facility]  LVAD placed 17   II  ASA 81 mg daily      Anticoagulation Care Providers     Provider Role Specialty Phone number    Delisa Montgomery MD Responsible Cardiology [phone]            See the Encounter Report to view Anticoagulation Flowsheet and Dosing Calendar (Go to Encounters tab in chart review, and find the Anticoagulation Therapy Visit)    Left message for patient with results and dosing recommendations. Asked patient to call back to report any missed doses, falls, signs and symptoms of bleeding or clotting, any changes in  health, medication, or diet. Asked patient to call back with any questions or concerns.     Patient had LVAD placed on:   6/19/17  Type of LVAD: Heartmate 2  Patient's current Aspirin dose: ASA 81mg Daily and instructed pt to increase to ASA 325mg Daily  LVAD Protocol followed: Yes   If Not Followed Explanation:  N/A    Radha Pedro RN

## 2019-08-30 ENCOUNTER — CARE COORDINATION (OUTPATIENT)
Dept: CARDIOLOGY | Facility: CLINIC | Age: 64
End: 2019-08-30

## 2019-08-30 NOTE — PROGRESS NOTES
Patient had his follow up labs drawn 8/28/19. Discussed labs with BARRIE Esposito. Called patient to follow up about his labs and let him know that his kidney function has improved on the increased dose of bumex. Received Voicemail.  Requested that patient returns my call to discuss how he is feeling and his weight trends.  Will await pt's return phone call.

## 2019-09-03 ENCOUNTER — ANTICOAGULATION THERAPY VISIT (OUTPATIENT)
Dept: ANTICOAGULATION | Facility: CLINIC | Age: 64
End: 2019-09-03

## 2019-09-03 DIAGNOSIS — Z79.01 LONG TERM CURRENT USE OF ANTICOAGULANT THERAPY: ICD-10-CM

## 2019-09-03 DIAGNOSIS — Z95.811 LVAD (LEFT VENTRICULAR ASSIST DEVICE) PRESENT (H): ICD-10-CM

## 2019-09-03 LAB — INR PPP: 2.24 (ref 0.86–1.14)

## 2019-09-03 PROCEDURE — 36415 COLL VENOUS BLD VENIPUNCTURE: CPT | Performed by: INTERNAL MEDICINE

## 2019-09-03 PROCEDURE — 85610 PROTHROMBIN TIME: CPT | Performed by: INTERNAL MEDICINE

## 2019-09-03 NOTE — PROGRESS NOTES
ANTICOAGULATION FOLLOW-UP CLINIC VISIT    Patient Name:  Jim Willingham  Date:  9/3/2019  Contact Type:  Telephone    SUBJECTIVE:  Patient Findings     Positives:   Change in health    Comments:   Pt thinks maybe the last INR was sub-therapeutic because he increased his intake of asparagus. Pt reports that he decreased his ASA back to 81mg on  independently. Pt reports he will try to watch his Vit K intake and would like to go back to two weeks between his INR        Clinical Outcomes     Comments:   Pt thinks maybe the last INR was sub-therapeutic because he increased his intake of asparagus. Pt reports that he decreased his ASA back to 81mg on  independently. Pt reports he will try to watch his Vit K intake and would like to go back to two weeks between his INR           OBJECTIVE    INR   Date Value Ref Range Status   2019 2.24 (H) 0.86 - 1.14 Final       ASSESSMENT / PLAN  INR assessment THER    Recheck INR In: 2 WEEKS    INR Location Clinic      Anticoagulation Summary  As of 9/3/2019    INR goal:   2.0-3.0   TTR:   81.4 % (2.2 y)   INR used for dosin.24 (9/3/2019)   Warfarin maintenance plan:   10 mg (5 mg x 2) every Mon, Wed, Fri; 7.5 mg (5 mg x 1.5) all other days   Full warfarin instructions:   10 mg every Mon, Wed, Fri; 7.5 mg all other days   Weekly warfarin total:   60 mg   Plan last modified:   Brendan Montoya RN (5/15/2019)   Next INR check:   2019   Priority:   INR   Target end date:       Indications    LVAD (left ventricular assist device) present (H) [Z95.811]  Long-term (current) use of anticoagulants [Z79.01] [Z79.01]             Anticoagulation Episode Summary     INR check location:       Preferred lab:       Send INR reminders to:   [facility]    Comments:   HIPPA Forms mailed 17  Labs drawn either at [facility] or [facility]  LVAD placed 17   II  ASA 81 mg daily      Anticoagulation Care Providers     Provider Role  Specialty Phone number    Delisa Montgomery MD [facility] Responsible Cardiology [phone]            See the Encounter Report to view Anticoagulation Flowsheet and Dosing Calendar (Go to Encounters tab in chart review, and find the Anticoagulation Therapy Visit)    Spoke with patient. Gave them their lab results and new warfarin recommendation.  No changes in health, medication, or diet. No missed doses, no falls. No signs or symptoms of bleed or clotting.     Patient had LVAD placed on:   6/19/17  Type of LVAD: HM2  Patient's current Aspirin dose: ASA 81mg Daily  LVAD Protocol followed: Yes  If Not Followed Explanation:  N/A    Radha Pedro RN

## 2019-09-04 ENCOUNTER — CARE COORDINATION (OUTPATIENT)
Dept: CARDIOLOGY | Facility: CLINIC | Age: 64
End: 2019-09-04

## 2019-09-04 NOTE — PROGRESS NOTES
Patient returned this writer's call.  Patient stated that he is feeling well on the increased bumex dose of 2mg BID. Patient stated that since 8/16, he lost 7 pounds.  Patient stated that the last 4 days, weight has remained stable and he is not loosing or gaining. VAD parameters stable. Denies dizziness or lightheadedness. Instructed patient to call VAD Coordinator on-call if he looses 2-3 pounds in a day or 5 in a week or if he feels dizzy or lightheaded; pt verbalized understanding.

## 2019-09-16 ENCOUNTER — ANTICOAGULATION THERAPY VISIT (OUTPATIENT)
Dept: ANTICOAGULATION | Facility: CLINIC | Age: 64
End: 2019-09-16

## 2019-09-16 DIAGNOSIS — Z95.811 LVAD (LEFT VENTRICULAR ASSIST DEVICE) PRESENT (H): ICD-10-CM

## 2019-09-16 DIAGNOSIS — Z79.01 LONG TERM CURRENT USE OF ANTICOAGULANT THERAPY: ICD-10-CM

## 2019-09-16 LAB — INR PPP: 1.81 (ref 0.86–1.14)

## 2019-09-16 PROCEDURE — 36415 COLL VENOUS BLD VENIPUNCTURE: CPT | Performed by: INTERNAL MEDICINE

## 2019-09-16 PROCEDURE — 85610 PROTHROMBIN TIME: CPT | Performed by: INTERNAL MEDICINE

## 2019-09-16 NOTE — PROGRESS NOTES
ANTICOAGULATION FOLLOW-UP CLINIC VISIT    Patient Name:  Jim Willingham  Date:  2019  Contact Type:  Telephone    SUBJECTIVE:  Patient Findings     Positives:   Change in medications (Started Ferrous sulfate .)             OBJECTIVE    INR   Date Value Ref Range Status   2019 1.81 (H) 0.86 - 1.14 Final       ASSESSMENT / PLAN  INR assessment SUB    Recheck INR In: 3 DAYS    INR Location Clinic      Anticoagulation Summary  As of 2019    INR goal:   2.0-3.0   TTR:   81.0 % (2.2 y)   INR used for dosin.81! (2019)   Warfarin maintenance plan:   10 mg (5 mg x 2) every Mon, Wed, Fri; 7.5 mg (5 mg x 1.5) all other days   Full warfarin instructions:   : 10 mg; Otherwise 10 mg every Mon, Wed, Fri; 7.5 mg all other days   Weekly warfarin total:   60 mg   Plan last modified:   Brendan Montoya RN (5/15/2019)   Next INR check:   2019   Priority:   INR   Target end date:       Indications    LVAD (left ventricular assist device) present (H) [Z95.811]  Long-term (current) use of anticoagulants [Z79.01] [Z79.01]             Anticoagulation Episode Summary     INR check location:       Preferred lab:       Send INR reminders to:   [facility]    Comments:   HIPPA Forms mailed 17  Labs drawn either at [facility] or [facility]  LVAD placed 17   II  ASA 81 mg daily      Anticoagulation Care Providers     Provider Role Specialty Phone number    JupiterDelisa travis MD [facility] Cardiology [phone]            See the Encounter Report to view Anticoagulation Flowsheet and Dosing Calendar (Go to Encounters tab in chart review, and find the Anticoagulation Therapy Visit)  Spoke with patient.  Patient had LVAD placed on:   2017  Type of LVAD: HM2  Patient's current Aspirin dose: 325mg until INR is again therapeutic. Then 81mg daily.  LVAD Protocol followed: yes   Delisa Hillman RN

## 2019-09-18 DIAGNOSIS — I50.22 CHRONIC SYSTOLIC CONGESTIVE HEART FAILURE (H): Primary | ICD-10-CM

## 2019-09-19 ENCOUNTER — ANTICOAGULATION THERAPY VISIT (OUTPATIENT)
Dept: ANTICOAGULATION | Facility: CLINIC | Age: 64
End: 2019-09-19

## 2019-09-19 DIAGNOSIS — Z79.01 LONG TERM CURRENT USE OF ANTICOAGULANT THERAPY: ICD-10-CM

## 2019-09-19 DIAGNOSIS — Z95.811 LVAD (LEFT VENTRICULAR ASSIST DEVICE) PRESENT (H): ICD-10-CM

## 2019-09-19 DIAGNOSIS — I50.22 CHRONIC SYSTOLIC CONGESTIVE HEART FAILURE (H): ICD-10-CM

## 2019-09-19 LAB
ALBUMIN SERPL-MCNC: 4 G/DL (ref 3.4–5)
ALP SERPL-CCNC: 72 U/L (ref 40–150)
ALT SERPL W P-5'-P-CCNC: 33 U/L (ref 0–70)
ANION GAP SERPL CALCULATED.3IONS-SCNC: 6 MMOL/L (ref 3–14)
AST SERPL W P-5'-P-CCNC: 25 U/L (ref 0–45)
BILIRUB SERPL-MCNC: 0.6 MG/DL (ref 0.2–1.3)
BUN SERPL-MCNC: 42 MG/DL (ref 7–30)
CALCIUM SERPL-MCNC: 8.8 MG/DL (ref 8.5–10.1)
CHLORIDE SERPL-SCNC: 103 MMOL/L (ref 94–109)
CO2 SERPL-SCNC: 30 MMOL/L (ref 20–32)
CREAT SERPL-MCNC: 1.34 MG/DL (ref 0.66–1.25)
D DIMER PPP FEU-MCNC: 0.5 UG/ML FEU (ref 0–0.5)
ERYTHROCYTE [DISTWIDTH] IN BLOOD BY AUTOMATED COUNT: 15.3 % (ref 10–15)
GFR SERPL CREATININE-BSD FRML MDRD: 56 ML/MIN/{1.73_M2}
GLUCOSE SERPL-MCNC: 86 MG/DL (ref 70–99)
HCT VFR BLD AUTO: 40.8 % (ref 40–53)
HGB BLD-MCNC: 13.1 G/DL (ref 13.3–17.7)
INR PPP: 1.96 (ref 0.86–1.14)
IRON SATN MFR SERPL: 40 % (ref 15–46)
IRON SERPL-MCNC: 139 UG/DL (ref 35–180)
LDH SERPL L TO P-CCNC: 372 U/L (ref 85–227)
MCH RBC QN AUTO: 28.5 PG (ref 26.5–33)
MCHC RBC AUTO-ENTMCNC: 32.1 G/DL (ref 31.5–36.5)
MCV RBC AUTO: 89 FL (ref 78–100)
PLATELET # BLD AUTO: 187 10E9/L (ref 150–450)
POTASSIUM SERPL-SCNC: 4.1 MMOL/L (ref 3.4–5.3)
PROT SERPL-MCNC: 6.9 G/DL (ref 6.8–8.8)
RBC # BLD AUTO: 4.59 10E12/L (ref 4.4–5.9)
SODIUM SERPL-SCNC: 139 MMOL/L (ref 133–144)
TIBC SERPL-MCNC: 348 UG/DL (ref 240–430)
WBC # BLD AUTO: 9.5 10E9/L (ref 4–11)

## 2019-09-19 PROCEDURE — 85610 PROTHROMBIN TIME: CPT | Performed by: INTERNAL MEDICINE

## 2019-09-19 PROCEDURE — 83540 ASSAY OF IRON: CPT | Performed by: INTERNAL MEDICINE

## 2019-09-19 PROCEDURE — 36415 COLL VENOUS BLD VENIPUNCTURE: CPT | Performed by: INTERNAL MEDICINE

## 2019-09-19 PROCEDURE — 80053 COMPREHEN METABOLIC PANEL: CPT | Performed by: INTERNAL MEDICINE

## 2019-09-19 PROCEDURE — 83550 IRON BINDING TEST: CPT | Performed by: INTERNAL MEDICINE

## 2019-09-19 PROCEDURE — 85379 FIBRIN DEGRADATION QUANT: CPT | Performed by: INTERNAL MEDICINE

## 2019-09-19 PROCEDURE — 85027 COMPLETE CBC AUTOMATED: CPT | Performed by: INTERNAL MEDICINE

## 2019-09-19 PROCEDURE — 83615 LACTATE (LD) (LDH) ENZYME: CPT | Performed by: INTERNAL MEDICINE

## 2019-09-19 NOTE — PROGRESS NOTES
ANTICOAGULATION FOLLOW-UP CLINIC VISIT    Patient Name:  Jim Willingham  Date:  2019  Contact Type:  Telephone    SUBJECTIVE:  Patient Findings     Comments:   Patient had LVAD placed on:   2017  Type of LVAD: HM2  Patient's current Aspirin dose: 325mg until INR is again therapeutic. Then 81mg daily.  LVAD Protocol followed: yes         Clinical Outcomes     Comments:   Patient had LVAD placed on:   2017  Type of LVAD: HM2  Patient's current Aspirin dose: 325mg until INR is again therapeutic. Then 81mg daily.  LVAD Protocol followed: yes            OBJECTIVE    INR   Date Value Ref Range Status   2019 1.96 (H) 0.86 - 1.14 Final       ASSESSMENT / PLAN  No question data found.  Anticoagulation Summary  As of 2019    INR goal:   2.0-3.0   TTR:   80.7 % (2.2 y)   INR used for dosin.96! (2019)   Warfarin maintenance plan:   No maintenance plan   Full warfarin instructions:   : 10 mg; : 10 mg; : 10 mg; : 7.5 mg   Plan last modified:   Maru Alonso RN (2019)   Next INR check:   2019   Priority:   INR   Target end date:       Indications    LVAD (left ventricular assist device) present (H) [Z95.811]  Long-term (current) use of anticoagulants [Z79.01] [Z79.01]             Anticoagulation Episode Summary     INR check location:       Preferred lab:       Send INR reminders to:   [facility]    Comments:   HIPPA Forms mailed 17  Labs drawn either at [facility] or [facility]  LVAD placed 17  HM II  ASA 81 mg daily      Anticoagulation Care Providers     Provider Role Specialty Phone number    Delisa Montgomery MD Responsible Cardiology [phone]            See the Encounter Report to view Anticoagulation Flowsheet and Dosing Calendar (Go to Encounters tab in chart review, and find the Anticoagulation Therapy Visit)    Spoke with patient.     Maru Alonso RN

## 2019-09-23 ENCOUNTER — ANTICOAGULATION THERAPY VISIT (OUTPATIENT)
Dept: ANTICOAGULATION | Facility: CLINIC | Age: 64
End: 2019-09-23

## 2019-09-23 DIAGNOSIS — Z79.01 LONG TERM CURRENT USE OF ANTICOAGULANT THERAPY: ICD-10-CM

## 2019-09-23 DIAGNOSIS — Z95.811 LVAD (LEFT VENTRICULAR ASSIST DEVICE) PRESENT (H): ICD-10-CM

## 2019-09-23 LAB — INR PPP: 2.64 (ref 0.86–1.14)

## 2019-09-23 PROCEDURE — 36415 COLL VENOUS BLD VENIPUNCTURE: CPT | Performed by: INTERNAL MEDICINE

## 2019-09-23 PROCEDURE — 85610 PROTHROMBIN TIME: CPT | Performed by: INTERNAL MEDICINE

## 2019-09-23 NOTE — PROGRESS NOTES
ANTICOAGULATION FOLLOW-UP CLINIC VISIT    Patient Name:  Jim Willingham  Date:  2019  Contact Type:  Telephone    SUBJECTIVE:         OBJECTIVE    INR   Date Value Ref Range Status   2019 2.64 (H) 0.86 - 1.14 Final       ASSESSMENT / PLAN  INR assessment THER    Recheck INR In: 4 DAYS    INR Location Clinic      Anticoagulation Summary  As of 2019    INR goal:   2.0-3.0   TTR:   80.7 % (2.2 y)   INR used for dosin.64 (2019)   Warfarin maintenance plan:   No maintenance plan   Full warfarin instructions:   : 7.5 mg; : 10 mg; : 10 mg; : 10 mg   Plan last modified:   Maru Alonso RN (2019)   Next INR check:   2019   Priority:   INR   Target end date:       Indications    LVAD (left ventricular assist device) present (H) [Z95.811]  Long-term (current) use of anticoagulants [Z79.01] [Z79.01]             Anticoagulation Episode Summary     INR check location:       Preferred lab:       Send INR reminders to:   [facility]    Comments:   HIPPA Forms mailed 17  Labs drawn [facility] at [facility] or [facility]  LVAD placed 17   II  ASA 81 mg daily      Anticoagulation Care Providers     Provider Role Specialty Phone number    Delisa Montgomery MD [facility] Cardiology [phone]            See the Encounter Report to view Anticoagulation Flowsheet and Dosing Calendar (Go to Encounters tab in chart review, and find the Anticoagulation Therapy Visit)    INR/CFX/F2 RESULT: INR result is 2.64    ASSESSMENT:No Problems found. No Changes in Health, Medications, or diet. No Signs of bruising, bleeding or clotting.    DOSING ADJUSTMENT: Recommended 7.5mg today, 10mg all other days.    NEXT INR/FACTOR X OR FACTOR II:  at next appt.    PROTOCOL FOLLOWED: LVAD  Patient had LVAD placed on:   17  Type of LVAD: HM 2  Patient's current Aspirin dose:  81mg  LVAD Protocol followed:  Yes.   If Not Followed Explanation:  Brendan Garcia  WILBERT, RN

## 2019-09-27 ENCOUNTER — ANCILLARY PROCEDURE (OUTPATIENT)
Dept: CARDIOLOGY | Facility: CLINIC | Age: 64
End: 2019-09-27
Attending: INTERNAL MEDICINE
Payer: COMMERCIAL

## 2019-09-27 ENCOUNTER — ANTICOAGULATION THERAPY VISIT (OUTPATIENT)
Dept: ANTICOAGULATION | Facility: CLINIC | Age: 64
End: 2019-09-27

## 2019-09-27 VITALS
HEIGHT: 68 IN | WEIGHT: 267.5 LBS | HEART RATE: 72 BPM | SYSTOLIC BLOOD PRESSURE: 68 MMHG | OXYGEN SATURATION: 97 % | BODY MASS INDEX: 40.54 KG/M2

## 2019-09-27 DIAGNOSIS — Z95.811 LVAD (LEFT VENTRICULAR ASSIST DEVICE) PRESENT (H): ICD-10-CM

## 2019-09-27 DIAGNOSIS — I42.9 CARDIOMYOPATHY (H): ICD-10-CM

## 2019-09-27 DIAGNOSIS — Z79.01 LONG TERM CURRENT USE OF ANTICOAGULANT THERAPY: ICD-10-CM

## 2019-09-27 DIAGNOSIS — I50.22 CHRONIC SYSTOLIC CONGESTIVE HEART FAILURE (H): Primary | ICD-10-CM

## 2019-09-27 PROCEDURE — 99215 OFFICE O/P EST HI 40 MIN: CPT | Mod: 25 | Performed by: INTERNAL MEDICINE

## 2019-09-27 PROCEDURE — G0463 HOSPITAL OUTPT CLINIC VISIT: HCPCS | Mod: 25,ZF

## 2019-09-27 PROCEDURE — 93750 INTERROGATION VAD IN PERSON: CPT | Mod: ZF | Performed by: INTERNAL MEDICINE

## 2019-09-27 ASSESSMENT — MIFFLIN-ST. JEOR: SCORE: 1987.87

## 2019-09-27 ASSESSMENT — PAIN SCALES - GENERAL: PAINLEVEL: NO PAIN (0)

## 2019-09-27 NOTE — PATIENT INSTRUCTIONS
It was a pleasure to see you in clinic today. Please do not hesitate to call with any questions or concerns. You are scheduled for a remote ICD transmission on 12/27/19. This will happen automatically in the night. We will call in 1-2 business days to discuss the results with you. We look forward to seeing you in clinic at your next device check in 6 months    Anastasia Cain, RN  Electrophysiology Nurse Clinician  [facility]  During business hours call:  [phone]  After business hours please call: [phone]- select option #4 and ask for job code [id number].

## 2019-09-27 NOTE — PROGRESS NOTES
"ANTICOAGULATION FOLLOW-UP CLINIC VISIT    Patient Name:  Jim Willingham  Date:  9/27/2019  Contact Type:  Telephone    SUBJECTIVE:  Patient Findings     Comments:   Pt is phoned on this date to remind him to get the INR.  He reports he already took 10 mg today.  He saw his PCP on 9/26 for what he thinks is an allergic reaction to a peach.  He experienced itching and a \"slight breathing problem.\"  His PCP prescribed 100 mg of PO benadryl on 9/26 & 50 mg of benadryl today.  Other than being sleepy he reports he is now fine.  He will not eat any more peaches        Clinical Outcomes     Comments:   Pt is phoned on this date to remind him to get the INR.  He reports he already took 10 mg today.  He saw his PCP on 9/26 for what he thinks is an allergic reaction to a peach.  He experienced itching and a \"slight breathing problem.\"  His PCP prescribed 100 mg of PO benadryl on 9/26 & 50 mg of benadryl today.  Other than being sleepy he reports he is now fine.  He will not eat any more peaches           OBJECTIVE    INR   Date Value Ref Range Status   09/23/2019 2.64 (H) 0.86 - 1.14 Final       ASSESSMENT / PLAN  No question data found.  Anticoagulation Summary  As of 9/27/2019    INR goal:   2.0-3.0   TTR:   80.7 % (2.2 y)   INR used for dosing:   No new INR was available at the time of this encounter.   Warfarin maintenance plan:   No maintenance plan   Full warfarin instructions:   9/27: 10 mg; 9/28: 7.5 mg; 9/29: 7.5 mg   Plan last modified:   Maru Alonso RN (9/19/2019)   Next INR check:   9/30/2019   Priority:   INR   Target end date:       Indications    LVAD (left ventricular assist device) present (H) [Z95.811]  Long-term (current) use of anticoagulants [Z79.01] [Z79.01]             Anticoagulation Episode Summary     INR check location:       Preferred lab:       Send INR reminders to:   [facility]    Comments:   HIPPA Forms mailed 6/26/17  Labs drawn either at [facility] or [facility]  LVAD " placed 6/19/17   II  ASA 81 mg daily      Anticoagulation Care Providers     Provider Role Specialty Phone number    Ulises Delisa Sprague MD Responsible Cardiology [phone]            See the Encounter Report to view Anticoagulation Flowsheet and Dosing Calendar (Go to Encounters tab in chart review, and find the Anticoagulation Therapy Visit)    See above notation  LVAD coordinator is alpha paged with a request to read this note re: the reaction noted above     Suyapa Crawford RN

## 2019-09-27 NOTE — NURSING NOTE
1). PUMP DATA  Primary controller serial number: [id number]    HM II:   Flow: 5.4 L/min,    Speed: 9400 RPMs,     PI: 5.7 ,  Power: 6.2 Manuel,      Primary controller   Back up battery: Patient use: 7, Replace in: 30  Months     Data downloaded: No   Equipment and driveline assessed for damage: Yes     Back up : Serial number: [id number]  Back up battery: Patient use: 4 Replace in: 33  Months  Programmed settings identical to the settings on the primary controller :Yes      Education complete: Yes   Charge the BACKUP controller s backup battery every 6 months  Perform a self test on BACKUP every 6 months  Change the MPU s batteries every 6 months:Yes  Have equipment serviced yearly (if applicable):Yes    2). ALARMS  Alarms reported by patient since last pump evaluation: No  Alarms or other finding noted during pump data history and alarm download: Yes - several PI events, rare speed changes to 9350, PI's ranging 3-7    Action Taken:  Reviewed data with patient: Yes      3). DRESSING CHANGE / DRIVELINE ASSESSMENT  Dressing change completed today: No  Appearance of Driveline site: c/d/i per pt report    Driveline stabilization: Method: [employer]  [ Teaching reinforced on need for stabilization of Driveline. ]    4).Pt. Education  D:  Pt education provided.  I:  Educated on MyChart  1.  How to message VAD Coordinator (send to MD but address to VAD Coordinator)  2. How to send photo via My Chart  3. When to use MyChart vs Call VAD Coordinator on Call.    A:  Pt verbalized understanding.  Pt  does want to sign up for MY Chart.  CMA signed pt up today.  P:  VAD Coordinator available for questions or concerns.  Will continue VAD education.

## 2019-09-27 NOTE — LETTER
9/27/2019      RE: Jim Willingham  [address] [facility] [id number]       Dear Colleague,    Thank you for the opportunity to participate in the care of your patient, Jim Willingham, at the [facility] HEART [facility]. Please see a copy of my visit note below.      September 27, 2019    HPI:   Jim Willingham is a 63 year old male with a past medical history of NICM (EF 20%) s/p HM II LVAD placement (6/19/17) at  (obesity) who is here for heart failure and LVAD follow up.     Today he reports feeling well overall.  He managed to exercise more over the summer and feels his endurance is better overall.  He can presently walk several blocks before becoming short of breath.     He denies any chest pain, edema,  orthopnea, PND. . He denies fevers, chills, driveline redness, tenderness, or discharge. No LVAD alarms and denies any stroke symptoms or blood in the urine or stools.    He is still working on his diet which is the main barrier to cardiac transplant right now.     From a pulmonary perspective for the last 6 months he has not needed antibiotics or steroids or nebulizers.       PAST MEDICAL HISTORY:  Past Medical History:   Diagnosis Date     Benign essential hypertension 5/11/2017     Cardiomyopathy, unspecified (H) 5/8/2017     CKD (chronic kidney disease) stage 3, GFR 30-59 ml/min (H) 5/11/2017     Depression 5/11/2017     Diabetes mellitus (H) 5/11/2017     H/O gastric bypass 5/11/2017     ICD (implantable cardioverter-defibrillator), biventricular, in situ 5/11/2017     LVAD (left ventricular assist device) present (H)      NICM (nonischemic cardiomyopathy) (H)/ EF 20% 5/11/2017    ECHO: LVEDd. 7.66 cm, Restrictive pattern , Severe mitral valve regurgitation     CECILIA (obstructive sleep apnea) 5/11/2017     Paroxysmal atrial fibrillation (H) 5/11/2017     Paroxysmal VT (H) 5/11/2017     Uncomplicated asthma      Vitamin B12 deficiency (non anemic) 5/11/2017        FAMILY HISTORY:  Family History   Problem Relation Age of Onset     Cerebrovascular Disease Mother      Diabetes Mother      Hypertension Mother      Coronary Artery Disease Father      Diabetes Type 2  Father      SOCIAL HISTORY:  No changes     CURRENT MEDICATIONS:  Current Outpatient Medications   Medication Sig Dispense Refill     acetaminophen (TYLENOL) 325 MG tablet Take 2 tablets (650 mg) by mouth every 6 hours 100 tablet      albuterol (ALBUTEROL) 108 (90 BASE) MCG/ACT Inhaler Inhale 2 puffs into the lungs every 4 hours as needed for shortness of breath / dyspnea or wheezing       allopurinol (ZYLOPRIM) 100 MG tablet Take 1 tablet (100 mg) by mouth daily 60 tablet 5     aspirin 81 MG chewable tablet 1 tablet (81 mg) by Oral or Feeding Tube route daily 36 tablet      bumetanide (BUMEX) 1 MG tablet Take 2 tablets (2 mg) by mouth 2 times daily 120 tablet 11     citalopram (CELEXA) 20 MG tablet Take 20 mg by mouth daily       cyanocobalamin (VITAMIN B12) 1000 MCG/ML injection Inject 1,000 mcg into the muscle every 30 days       ferrous sulfate (FE TABS) 325 (65 Fe) MG EC tablet Take 1 tablet (325 mg) by mouth daily 30 tablet 11     fluticasone-vilanterol (BREO ELLIPTA) 200-25 MCG/INH oral inhaler Inhale 1 puff into the lungs daily       gabapentin (NEURONTIN) 300 MG capsule Take 1 capsule (300 mg) by mouth 3 times daily 90 capsule      ipratropium - albuterol 0.5 mg/2.5 mg/3 mL (DUONEB) 0.5-2.5 (3) MG/3ML neb solution Take 3 mLs by nebulization every 4 hours as needed for shortness of breath / dyspnea or wheezing       losartan (COZAAR) 25 MG tablet Take 1.5 tablets (37.5 mg) by mouth daily 135 tablet 3     metFORMIN (GLUCOPHAGE) 500 MG tablet Take 1 tablet (500 mg) by mouth 2 times daily (with meals) 60 tablet 0     metoprolol succinate (TOPROL-XL) 25 MG 24 hr tablet Take 50mg in the morning and 25mg in the evening 180 tablet 5     montelukast (SINGULAIR) 10 MG tablet Take 10 mg by mouth At Bedtime        "pantoprazole (PROTONIX) 40 MG EC tablet Take 1 tablet (40 mg) by mouth every morning 60 tablet 5     potassium chloride (K-TAB,KLOR-CON) 10 MEQ tablet Take 2 tablets (20 mEq) by mouth daily 120 tablet 5     spironolactone (ALDACTONE) 25 MG tablet Take 1 tablet (25 mg) by mouth daily 30 tablet 5     traMADol (ULTRAM) 50 MG tablet Take 2 tablets (100 mg) by mouth every 6 hours as needed for moderate pain or breakthrough pain 28 tablet      umeclidinium (INCRUSE ELLIPTA) 62.5 MCG/INH oral inhaler Inhale 1 puff into the lungs daily       warfarin (COUMADIN) 3 MG tablet Use as directed. 30 tablet 0     warfarin (COUMADIN) 5 MG tablet Take 5 - 7.5mg daily or as directed by coumadin clinic. (Patient taking differently: Take 5 - 7.5mg daily or as directed by coumadin clinic.  Per pt takes 10mg on Monday and Friday and 7.5mg all the other days) 90 tablet 5     zinc sulfate (ZINCATE) 220 (50 ZN) MG capsule Take 220 mg by mouth 2 times daily       Review of systems: 12 point review of systems negative unless noted in the HPI-   He did have an allergic reaction to peaches yesterday with some hives and took Benadryl with improvement    EXAM:  BP (!) 68/0 (BP Location: Right arm, Patient Position: Chair, Cuff Size: Adult Regular)   Pulse 72   Ht 1.727 m (5' 8\")   Wt 121.3 kg (267 lb 8 oz)   SpO2 97%   BMI 40.67 kg/m     General: appears comfortable, alert and articulate  Head: normocephalic, atraumatic  Eyes: anicteric sclera, EOMI  Neck: no adenopathy, neck supple  Orophyarynx: moist mucosa, no lesions, dentition intact  Heart: LVAD hum present, no murmur, gallop, rub.  JVP 10 cm  Lungs: clear, no rales or wheezing  Abdomen: soft, non-tender, bowel sounds present, no hepatomegaly.    Extremities: no clubbing, cyanosis, trace edema.    Neurological: normal speech and affect, no gross motor deficits      Labs:    Lab Results   Component Value Date    WBC 9.5 09/19/2019    HGB 13.1 (L) 09/19/2019    HCT 40.8 09/19/2019    PLT " 187 09/19/2019     09/19/2019    POTASSIUM 4.1 09/19/2019    CHLORIDE 103 09/19/2019    CO2 30 09/19/2019    BUN 42 (H) 09/19/2019    CR 1.34 (H) 09/19/2019    GLC 86 09/19/2019    SED 18 04/13/2018    DD 0.5 09/19/2019    NTBNPI 4,216 (H) 06/15/2017    NTBNP 866 (H) 07/31/2019    TROPI 0.017 02/12/2019    AST 25 09/19/2019    ALT 33 09/19/2019    ALKPHOS 72 09/19/2019    BILITOTAL 0.6 09/19/2019    INR 2.64 (H) 09/23/2019       ICD interrogation:     Patient seen in [facility] for evaluation and iterative programming of a [employer] Bi-V ICD per MD orders. Patient has an appointment to see Dr. Montgomery today. Normal ICD function. 6 NSVT episodes recorded lasting 2 - 4 sec @ 180- 231 bpm. Intrinsic rhythm = SR @ 93 bpm. AP = 2%.  = 91.7%. OptiVol fluid index is at the baseline. Estimated battery longevity to MARVA = 2.3 years. No short v-v intervals recorded. Lead trends appear stable. Patient reports that he is feeling well other than having some type of allergic reaction to a peach last night. He reports his eyes and hands became very itchy after eating the peach. He took Benadryl last night and this morning and is feeling better but still experiencing some itching. Plan for patient to send a remote transmission in 3 months and return to clinic in 6 months. EDUARDO Cain RN, BSN. Multi lead ICDI have reviewed and interpreted the device interrogation, settings, programming and nurse's summary. The device is functioning within normal device parameters. I agree with the current findings, assessment and plan.      Most recent echocardiogram:  Interpretation Summary  HM2 LVAD at 9400 RPM.  Severely dilated LV with severely reduced global LV function, LVEF<20%.  LVIDd=6.1 cm.  [id number] dilated RV with moderately reduced global RV function.  The ventricular septum is midline. The aortic valve opens minimally with each  beat. There is mild AI.  LVAD inflow cannula is visualized in the LV apex. LVAD outflow graft  is  visualized in the aorta. Normal Doppler interrogation of the LVAD inflow  cannula and outflow graft.     This study was compared with the study from 7/6/18 .  There has been no change.  _____________________________________________________________________________      July 2019   RA Pressures 10:04 AM   10 mmHg    93333 mmHg    77914 mmHg      55 bpm      RV Pressures  9:42 AM       384 mmHg/sec        10:01 AM 25 mmHg        10 mmHg     39 bpm      PA Pressures 10:01 AM 25 mmHg    16 mmHg    19 mmHg        80 bpm      PCW Pressures 10:02 AM   11 mmHg    46401 mmHg    25106 mmHg      68 bpm      Blood Flow Results Phase: Baseline      Time Results Indexed Values   QP  9:42 AM 4.62 L/min    2.01 L/min/m2      QS  9:42 AM 4.62 L/min    2.01 L/min/m2          Assessment and Plan:    In summary Jim Willingham has a history of NICM (EF 20%) s/p HM II LVAD as DT (obesity).  Overall he has been doing well.   His hemodynamics over the summer were reassuring that his wedge pressure is normal and his index is preserved.    He does have higher right-sided filling pressures than left and moderate RV dysfunction on his echocardiogram but is overall doing quite well.  The biggest barriers to cardiac transplant include his BMI and we will also need to carefully look at his lung function as we get closer to transplant listing (he has had frequent bronchitis requiring nebulizers etc-although this is been better recently)    Chronic systolic heart failure secondary to NICM.    Stage D    NYHA Class II  ACEi/ARB: yes  BB: on Toprol XL YES   Aldosterone antagonist: Aldactone to 25 mg daily.   SCD prophylaxis: CRT-D  Fluid status: relatively euvolemic today      LVAD S/P HM II LVAD 6/19/17:at DT   MAP controlled on current regimen.  Anticoagulation: Warfarin INR goal 2-3.   Antiplatelet: restart ASA dose 81 mg daily.  LDH: Stable     CKD Stage III:Overall stable     Paroxysmal Atrial Fibrillation. Anticoagulated and on Toprol XL.        Iron deficiency:  iron studies are improved today as this is MCV and hemoglobin.  No further iron needed presently-we will continue to monitor his hemoglobin    History of ventricular tachycardia with appropriate therapy-we will not change medical therapy for now.     Cardiac transplant eligibility: he needs to lose some more weight.  I have given him a clear exercise prescription to follow going forward I want him exercising 30 minutes 4-5 times per week and it should feel difficult.  We also informed him that we are recommending high intensity interval training to assist with weight loss and muscle conditioning  specifics were provided    RV dysfunction:  His last right heart cath suggestive of some right ventricular dysfunction with normal wedge pressure of 11, and elevated right atrial pressure of 17, and mildly decreased cardiac output.  This is stable for now on a lower dose of beta-blocker     Obesity - see above plan     RTC 4 months NP, 8 months with me     If he meets weight criteria for transplant evaluation we will start the evaluation process at that time and -obtain PFTs, CT and a pulm consult      Delisa Montgomery MD   of Medicine   [facility] Division of Cardiology

## 2019-09-27 NOTE — PROGRESS NOTES
September 27, 2019    HPI:   Jim Willingham is a 63 year old male with a past medical history of NICM (EF 20%) s/p HM II LVAD placement (6/19/17) at [facility] (obesity) who is here for heart failure and LVAD follow up.     Today he reports feeling well overall.  He managed to exercise more over the summer and feels his endurance is better overall.  He can presently walk several blocks before becoming short of breath.     He denies any chest pain, edema,  orthopnea, PND. . He denies fevers, chills, driveline redness, tenderness, or discharge. No LVAD alarms and denies any stroke symptoms or blood in the urine or stools.    He is still working on his diet which is the main barrier to cardiac transplant right now.     From a pulmonary perspective for the last 6 months he has not needed antibiotics or steroids or nebulizers.       PAST MEDICAL HISTORY:  Past Medical History:   Diagnosis Date     Benign essential hypertension 5/11/2017     Cardiomyopathy, unspecified (H) 5/8/2017     CKD (chronic kidney disease) stage 3, GFR 30-59 ml/min (H) 5/11/2017     Depression 5/11/2017     Diabetes mellitus (H) 5/11/2017     H/O gastric bypass 5/11/2017     ICD (implantable cardioverter-defibrillator), biventricular, in situ 5/11/2017     LVAD (left ventricular assist device) present (H)      NICM (nonischemic cardiomyopathy) (H)/ EF 20% 5/11/2017    ECHO: LVEDd. 7.66 cm, Restrictive pattern , Severe mitral valve regurgitation     CECILIA (obstructive sleep apnea) 5/11/2017     Paroxysmal atrial fibrillation (H) 5/11/2017     Paroxysmal VT (H) 5/11/2017     Uncomplicated asthma      Vitamin B12 deficiency (non anemic) 5/11/2017       FAMILY HISTORY:  Family History   Problem Relation Age of Onset     Cerebrovascular Disease Mother      Diabetes Mother      Hypertension Mother      Coronary Artery Disease Father      Diabetes Type 2  Father      SOCIAL HISTORY:  No changes     CURRENT MEDICATIONS:  Current Outpatient Medications   Medication  Sig Dispense Refill     acetaminophen (TYLENOL) 325 MG tablet Take 2 tablets (650 mg) by mouth every 6 hours 100 tablet      albuterol (ALBUTEROL) 108 (90 BASE) MCG/ACT Inhaler Inhale 2 puffs into the lungs every 4 hours as needed for shortness of breath / dyspnea or wheezing       allopurinol (ZYLOPRIM) 100 MG tablet Take 1 tablet (100 mg) by mouth daily 60 tablet 5     aspirin 81 MG chewable tablet 1 tablet (81 mg) by Oral or Feeding Tube route daily 36 tablet      bumetanide (BUMEX) 1 MG tablet Take 2 tablets (2 mg) by mouth 2 times daily 120 tablet 11     citalopram (CELEXA) 20 MG tablet Take 20 mg by mouth daily       cyanocobalamin (VITAMIN B12) 1000 MCG/ML injection Inject 1,000 mcg into the muscle every 30 days       ferrous sulfate (FE TABS) 325 (65 Fe) MG EC tablet Take 1 tablet (325 mg) by mouth daily 30 tablet 11     fluticasone-vilanterol (BREO ELLIPTA) 200-25 MCG/INH oral inhaler Inhale 1 puff into the lungs daily       gabapentin (NEURONTIN) 300 MG capsule Take 1 capsule (300 mg) by mouth 3 times daily 90 capsule      ipratropium - albuterol 0.5 mg/2.5 mg/3 mL (DUONEB) 0.5-2.5 (3) MG/3ML neb solution Take 3 mLs by nebulization every 4 hours as needed for shortness of breath / dyspnea or wheezing       losartan (COZAAR) 25 MG tablet Take 1.5 tablets (37.5 mg) by mouth daily 135 tablet 3     metFORMIN (GLUCOPHAGE) 500 MG tablet Take 1 tablet (500 mg) by mouth 2 times daily (with meals) 60 tablet 0     metoprolol succinate (TOPROL-XL) 25 MG 24 hr tablet Take 50mg in the morning and 25mg in the evening 180 tablet 5     montelukast (SINGULAIR) 10 MG tablet Take 10 mg by mouth At Bedtime       pantoprazole (PROTONIX) 40 MG EC tablet Take 1 tablet (40 mg) by mouth every morning 60 tablet 5     potassium chloride (K-TAB,KLOR-CON) 10 MEQ tablet Take 2 tablets (20 mEq) by mouth daily 120 tablet 5     spironolactone (ALDACTONE) 25 MG tablet Take 1 tablet (25 mg) by mouth daily 30 tablet 5     traMADol (ULTRAM)  "50 MG tablet Take 2 tablets (100 mg) by mouth every 6 hours as needed for moderate pain or breakthrough pain 28 tablet      umeclidinium (INCRUSE ELLIPTA) 62.5 MCG/INH oral inhaler Inhale 1 puff into the lungs daily       warfarin (COUMADIN) 3 MG tablet Use as directed. 30 tablet 0     warfarin (COUMADIN) 5 MG tablet Take 5 - 7.5mg daily or as directed by coumadin clinic. (Patient taking differently: Take 5 - 7.5mg daily or as directed by coumadin clinic.  Per pt takes 10mg on Monday and Friday and 7.5mg all the other days) 90 tablet 5     zinc sulfate (ZINCATE) 220 (50 ZN) MG capsule Take 220 mg by mouth 2 times daily       Review of systems: 12 point review of systems negative unless noted in the HPI-   He did have an allergic reaction to peaches yesterday with some hives and took Benadryl with improvement    EXAM:  BP (!) 68/0 (BP Location: Right arm, Patient Position: Chair, Cuff Size: Adult Regular)   Pulse 72   Ht 1.727 m (5' 8\")   Wt 121.3 kg (267 lb 8 oz)   SpO2 97%   BMI 40.67 kg/m    General: appears comfortable, alert and articulate  Head: normocephalic, atraumatic  Eyes: anicteric sclera, EOMI  Neck: no adenopathy, neck supple  Orophyarynx: moist mucosa, no lesions, dentition intact  Heart: LVAD hum present, no murmur, gallop, rub.  JVP 10 cm  Lungs: clear, no rales or wheezing  Abdomen: soft, non-tender, bowel sounds present, no hepatomegaly.    Extremities: no clubbing, cyanosis, trace edema.    Neurological: normal speech and affect, no gross motor deficits      Labs:    Lab Results   Component Value Date    WBC 9.5 09/19/2019    HGB 13.1 (L) 09/19/2019    HCT 40.8 09/19/2019     09/19/2019     09/19/2019    POTASSIUM 4.1 09/19/2019    CHLORIDE 103 09/19/2019    CO2 30 09/19/2019    BUN 42 (H) 09/19/2019    CR 1.34 (H) 09/19/2019    GLC 86 09/19/2019    SED 18 04/13/2018    DD 0.5 09/19/2019    NTBNPI 4,216 (H) 06/15/2017    NTBNP 866 (H) 07/31/2019    TROPI 0.017 02/12/2019    AST " 25 09/19/2019    ALT 33 09/19/2019    ALKPHOS 72 09/19/2019    BILITOTAL 0.6 09/19/2019    INR 2.64 (H) 09/23/2019       ICD interrogation:     [place] [facility] [place] and iterative programming of a [employer] Bi-V ICD per MD orders. Patient has an appointment to see Dr. Montgomery today. Normal ICD function. 6 NSVT episodes recorded lasting 2 - 4 sec @ 180- 231 bpm. Intrinsic rhythm = SR @ 93 bpm. AP = 2%.  = 91.7%. OptiVol fluid index is at the baseline. Estimated battery longevity to MARVA = 2.3 years. No short v-v intervals recorded. Lead trends appear stable. Patient reports that he is feeling well other than having some type of allergic reaction to a peach last night. He reports his eyes and hands became very itchy after eating the peach. He took Benadryl last night and this morning and is feeling better but still experiencing some itching. Plan for patient to send a remote transmission in 3 months and return to clinic in 6 months. EDUARDO Cain RN, BSN. Multi lead ICDI have reviewed and interpreted the device interrogation, settings, programming and nurse's summary. The device is functioning within normal device parameters. I agree with the current findings, assessment and plan.      Most recent echocardiogram:  Interpretation Summary  HM2 LVAD at 9400 RPM.  Severely dilated LV with severely reduced global LV function, LVEF<20%.  LVIDd=6.1 cm.  Gymyzp-[id number] dilated RV with moderately reduced global RV function.  The ventricular septum is midline. The aortic valve opens minimally with each  beat. There is mild AI.  LVAD inflow cannula is visualized in the LV apex. LVAD outflow graft is  visualized in the aorta. Normal Doppler interrogation of the LVAD inflow  cannula and outflow graft.     This study was compared with the study from 7/6/18 .  There has been no change.  _____________________________________________________________________________      July 2019   RA Pressures 10:04 AM   10 mmHg     58944 mmHg    21841 mmHg      55 bpm      RV Pressures  9:42 AM       384 mmHg/sec        10:01 AM 25 mmHg        10 mmHg     39 bpm      PA Pressures 10:01 AM 25 mmHg    16 mmHg    19 mmHg        80 bpm      PCW Pressures 10:02 AM   11 mmHg    98500 mmHg    14982 mmHg      68 bpm      Blood Flow Results Phase: Baseline      Time Results Indexed Values   QP  9:42 AM 4.62 L/min    2.01 L/min/m2      QS  9:42 AM 4.62 L/min    2.01 L/min/m2          Assessment and Plan:    In summary Jim Willingham has a history of NICM (EF 20%) s/p HM II LVAD as DT (obesity).  Overall he has been doing well.   His hemodynamics over the summer were reassuring that his wedge pressure is normal and his index is preserved.    He does have higher right-sided filling pressures than left and moderate RV dysfunction on his echocardiogram but is overall doing quite well.  The biggest barriers to cardiac transplant include his BMI and we will also need to carefully look at his lung function as we get closer to transplant listing (he has had frequent bronchitis requiring nebulizers etc-although this is been better recently)    Chronic systolic heart failure secondary to NICM.    Stage D    NYHA Class II  ACEi/ARB: yes  BB: on Toprol XL YES   Aldosterone antagonist: Aldactone to 25 mg daily.   SCD prophylaxis: CRT-D  Fluid status: relatively euvolemic today      LVAD S/P HM II LVAD 6/19/17:at DT   MAP controlled on current regimen.  Anticoagulation: Warfarin INR goal 2-3.   Antiplatelet: restart ASA dose 81 mg daily.  LDH: Stable     CKD Stage III:Overall stable     Paroxysmal Atrial Fibrillation. Anticoagulated and on Toprol XL.       Iron deficiency:  iron studies are improved today as this is MCV and hemoglobin.  No further iron needed presently-we will continue to monitor his hemoglobin    History of ventricular tachycardia with appropriate therapy-we will not change medical therapy for now.     Cardiac transplant eligibility: he needs to lose  some more weight.  I have given him a clear exercise prescription to follow going forward I want him exercising 30 minutes 4-5 times per week and it should feel difficult.  We also informed him that we are recommending high intensity interval training to assist with weight loss and muscle conditioning  specifics were provided    RV dysfunction:  His last right heart cath suggestive of some right ventricular dysfunction with normal wedge pressure of 11, and elevated right atrial pressure of 17, and mildly decreased cardiac output.  This is stable for now on a lower dose of beta-blocker     Obesity - see above plan     RTC 4 months NP, 8 months with me     If he meets weight criteria for transplant evaluation we will start the evaluation process at that time and -obtain PFTs, CT and a pulm consult      Delisa Montgomery MD   of Medicine   [facility] Division of Cardiology

## 2019-09-27 NOTE — PATIENT INSTRUCTIONS
Medications:  1. STOP taking iron supplements    Follow-up:  1. Make an appt with an NP in 4 months and with Dr. Montgomery in 8 months    Instructions:  1. Now is your time to work on you. Increase your activity level to include aerobic activity. Try interval training - intense work out for a few minutes, rest for 3 minute. Work-up to a total of 30 minutes. Ideally, you will get to breathing heavy and sweating.   For transplant listing, when you reach a BMI of 37, we can start finishing testing for listing. You must continue weight loss to a BMI of 35 to remain on the transplant list, and stay at 35 or lower.   242lb = bmi 37  230lb = bmi 35  2. Rajani will look into cardiac rehab.    Page the VAD Coordinator on call if you gain more than 3 lb in a day or 5 in a week. Please also page if you feel unwell or have alarms.     Great to see you in clinic today. To Page the VAD Coordinator on call, dial [phone] option #4 and ask to speak to the VAD coordinator on call.

## 2019-09-30 ENCOUNTER — ANTICOAGULATION THERAPY VISIT (OUTPATIENT)
Dept: ANTICOAGULATION | Facility: CLINIC | Age: 64
End: 2019-09-30

## 2019-09-30 DIAGNOSIS — Z79.01 LONG TERM CURRENT USE OF ANTICOAGULANT THERAPY: ICD-10-CM

## 2019-09-30 DIAGNOSIS — I50.22 CHRONIC SYSTOLIC CONGESTIVE HEART FAILURE (H): ICD-10-CM

## 2019-09-30 DIAGNOSIS — Z95.811 LVAD (LEFT VENTRICULAR ASSIST DEVICE) PRESENT (H): ICD-10-CM

## 2019-09-30 LAB — INR PPP: 1.22 (ref 0.86–1.14)

## 2019-09-30 PROCEDURE — 85610 PROTHROMBIN TIME: CPT | Performed by: INTERNAL MEDICINE

## 2019-09-30 PROCEDURE — 36415 COLL VENOUS BLD VENIPUNCTURE: CPT | Performed by: INTERNAL MEDICINE

## 2019-09-30 NOTE — PROGRESS NOTES
ANTICOAGULATION FOLLOW-UP CLINIC VISIT    Patient Name:  Jim Willingham  Date:  2019  Contact Type:  Telephone    SUBJECTIVE:  Patient Findings     Comments:   Unexplained sub therapeutic INR.  Verified with Jim that he has peach colored 5 mg warfarin tablets.  He does not think he missed any doses of warfarin. No changes in health or diet.  He did stop taking oral iron on 19.  Spoke with LVAD coordinator, Rajani--Jim will bridge with lovenox 120 mg BID until INR is therapeutic.  He will also increase ASA dose to 325 mg daily until INR is therapeutic.        Clinical Outcomes     Comments:   Unexplained sub therapeutic INR.  Verified with Jim that he has peach colored 5 mg warfarin tablets.  He does not think he missed any doses of warfarin. No changes in health or diet.  He did stop taking oral iron on 19.  Spoke with LVAD coordinator, Rajani--Jim will bridge with lovenox 120 mg BID until INR is therapeutic.  He will also increase ASA dose to 325 mg daily until INR is therapeutic.           OBJECTIVE    INR   Date Value Ref Range Status   2019 1.22 (H) 0.86 - 1.14 Final       ASSESSMENT / PLAN  INR assessment SUB    Recheck INR In: 3 DAYS    INR Location Clinic      Anticoagulation Summary  As of 2019    INR goal:   2.0-3.0   TTR:   80.4 % (2.2 y)   INR used for dosin.22! (2019)   Warfarin maintenance plan:   No maintenance plan   Full warfarin instructions:   : 12.5 mg; 10/1: 10 mg; 10/2: 10 mg   Plan last modified:   Maru Alonso RN (2019)   Next INR check:   10/3/2019   Priority:   INR   Target end date:       Indications    LVAD (left ventricular assist device) present (H) [Z95.811]  Long-term (current) use of anticoagulants [Z79.01] [Z79.01]             Anticoagulation Episode Summary     INR check location:       Preferred lab:       Send INR reminders to:   [facility]    Comments:   HIPPA Forms mailed 17  Labs drawn either at [facility] or [facility]  LVAD placed 6/19/17  HM II  ASA 81 mg daily      Anticoagulation Care Providers     Provider Role Specialty Phone number    UlisesDelisa MD Responsible Cardiology [phone]            See the Encounter Report to view Anticoagulation Flowsheet and Dosing Calendar (Go to Encounters tab in chart review, and find the Anticoagulation Therapy Visit)    Spoke with Jim and LVAD coordinator, Rajani.  Jim stopped taking oral iron on 9/27/19, otherwise no changes in health or diet.      Patient had LVAD placed on:   6/19/17  Type of LVAD: HM 2  Patient's current Aspirin dose: he will increase ASA dose to 325 mg daily until INR is therapeutic.  He will also bridge with lovenox 120 mg BID until INR is therapeutic.  Jim has used lovenox in the past and states he remembers how to give it.  LVAD Protocol followed: Yes     Kirsty Bermudez RN

## 2019-10-02 LAB
MDC_IDC_EPISODE_DTM: NORMAL
MDC_IDC_EPISODE_DURATION: 0 S
MDC_IDC_EPISODE_DURATION: 1 S
MDC_IDC_EPISODE_DURATION: 13 S
MDC_IDC_EPISODE_DURATION: 18 S
MDC_IDC_EPISODE_DURATION: 22 S
MDC_IDC_EPISODE_DURATION: 23 S
MDC_IDC_EPISODE_DURATION: 340 S
MDC_IDC_EPISODE_DURATION: 36 S
MDC_IDC_EPISODE_DURATION: 4 S
MDC_IDC_EPISODE_DURATION: 41 S
MDC_IDC_EPISODE_ID: 267
MDC_IDC_EPISODE_ID: 268
MDC_IDC_EPISODE_ID: 269
MDC_IDC_EPISODE_ID: 270
MDC_IDC_EPISODE_ID: 271
MDC_IDC_EPISODE_ID: 272
MDC_IDC_EPISODE_ID: 8701
MDC_IDC_EPISODE_ID: 8702
MDC_IDC_EPISODE_ID: 8703
MDC_IDC_EPISODE_ID: 8704
MDC_IDC_EPISODE_ID: 8705
MDC_IDC_EPISODE_ID: 8706
MDC_IDC_EPISODE_ID: 8707
MDC_IDC_EPISODE_TYPE: NORMAL
MDC_IDC_LEAD_IMPLANT_DT: NORMAL
MDC_IDC_LEAD_LOCATION: NORMAL
MDC_IDC_LEAD_LOCATION_DETAIL_1: NORMAL
MDC_IDC_LEAD_MFG: NORMAL
MDC_IDC_LEAD_MODEL: NORMAL
MDC_IDC_LEAD_POLARITY_TYPE: NORMAL
MDC_IDC_LEAD_SERIAL: NORMAL
MDC_IDC_MSMT_BATTERY_DTM: NORMAL
MDC_IDC_MSMT_BATTERY_REMAINING_LONGEVITY: 29 MO
MDC_IDC_MSMT_BATTERY_RRT_TRIGGER: 2.73
MDC_IDC_MSMT_BATTERY_STATUS: NORMAL
MDC_IDC_MSMT_BATTERY_VOLTAGE: 2.95 V
MDC_IDC_MSMT_CAP_CHARGE_DTM: NORMAL
MDC_IDC_MSMT_CAP_CHARGE_ENERGY: 18 J
MDC_IDC_MSMT_CAP_CHARGE_TIME: 4.22
MDC_IDC_MSMT_CAP_CHARGE_TYPE: NORMAL
MDC_IDC_MSMT_LEADCHNL_LV_IMPEDANCE_VALUE: 380 OHM
MDC_IDC_MSMT_LEADCHNL_LV_IMPEDANCE_VALUE: 4047 OHM
MDC_IDC_MSMT_LEADCHNL_LV_IMPEDANCE_VALUE: 4047 OHM
MDC_IDC_MSMT_LEADCHNL_LV_PACING_THRESHOLD_AMPLITUDE: 0.75 V
MDC_IDC_MSMT_LEADCHNL_LV_PACING_THRESHOLD_AMPLITUDE: 0.88 V
MDC_IDC_MSMT_LEADCHNL_LV_PACING_THRESHOLD_PULSEWIDTH: 0.4 MS
MDC_IDC_MSMT_LEADCHNL_LV_PACING_THRESHOLD_PULSEWIDTH: 0.4 MS
MDC_IDC_MSMT_LEADCHNL_RA_IMPEDANCE_VALUE: 437 OHM
MDC_IDC_MSMT_LEADCHNL_RA_PACING_THRESHOLD_AMPLITUDE: 1.38 V
MDC_IDC_MSMT_LEADCHNL_RA_PACING_THRESHOLD_AMPLITUDE: 1.5 V
MDC_IDC_MSMT_LEADCHNL_RA_PACING_THRESHOLD_PULSEWIDTH: 0.4 MS
MDC_IDC_MSMT_LEADCHNL_RA_PACING_THRESHOLD_PULSEWIDTH: 0.4 MS
MDC_IDC_MSMT_LEADCHNL_RA_SENSING_INTR_AMPL: 1.62 MV
MDC_IDC_MSMT_LEADCHNL_RA_SENSING_INTR_AMPL: 4.62 MV
MDC_IDC_MSMT_LEADCHNL_RV_IMPEDANCE_VALUE: 266 OHM
MDC_IDC_MSMT_LEADCHNL_RV_IMPEDANCE_VALUE: 342 OHM
MDC_IDC_MSMT_LEADCHNL_RV_PACING_THRESHOLD_AMPLITUDE: 0.88 V
MDC_IDC_MSMT_LEADCHNL_RV_PACING_THRESHOLD_AMPLITUDE: 1 V
MDC_IDC_MSMT_LEADCHNL_RV_PACING_THRESHOLD_PULSEWIDTH: 0.4 MS
MDC_IDC_MSMT_LEADCHNL_RV_PACING_THRESHOLD_PULSEWIDTH: 0.4 MS
MDC_IDC_MSMT_LEADCHNL_RV_SENSING_INTR_AMPL: 4.38 MV
MDC_IDC_MSMT_LEADCHNL_RV_SENSING_INTR_AMPL: 7.38 MV
MDC_IDC_PG_IMPLANT_DTM: NORMAL
MDC_IDC_PG_MFG: NORMAL
MDC_IDC_PG_MODEL: NORMAL
MDC_IDC_PG_SERIAL: NORMAL
MDC_IDC_PG_TYPE: NORMAL
MDC_IDC_SESS_CLINIC_NAME: NORMAL
MDC_IDC_SESS_DTM: NORMAL
MDC_IDC_SESS_TYPE: NORMAL
MDC_IDC_SET_BRADY_AT_MODE_SWITCH_RATE: 150 {BEATS}/MIN
MDC_IDC_SET_BRADY_LOWRATE: 50 {BEATS}/MIN
MDC_IDC_SET_BRADY_MAX_SENSOR_RATE: 130 {BEATS}/MIN
MDC_IDC_SET_BRADY_MAX_TRACKING_RATE: 130 {BEATS}/MIN
MDC_IDC_SET_BRADY_MODE: NORMAL
MDC_IDC_SET_BRADY_PAV_DELAY_LOW: 150 MS
MDC_IDC_SET_BRADY_SAV_DELAY_LOW: 130 MS
MDC_IDC_SET_CRT_PACED_CHAMBERS: NORMAL
MDC_IDC_SET_LEADCHNL_LV_PACING_AMPLITUDE: 2 V
MDC_IDC_SET_LEADCHNL_LV_PACING_ANODE_ELECTRODE_1: NORMAL
MDC_IDC_SET_LEADCHNL_LV_PACING_ANODE_LOCATION_1: NORMAL
MDC_IDC_SET_LEADCHNL_LV_PACING_CAPTURE_MODE: NORMAL
MDC_IDC_SET_LEADCHNL_LV_PACING_CATHODE_ELECTRODE_1: NORMAL
MDC_IDC_SET_LEADCHNL_LV_PACING_CATHODE_LOCATION_1: NORMAL
MDC_IDC_SET_LEADCHNL_LV_PACING_POLARITY: NORMAL
MDC_IDC_SET_LEADCHNL_LV_PACING_PULSEWIDTH: 0.4 MS
MDC_IDC_SET_LEADCHNL_RA_PACING_AMPLITUDE: 2.75 V
MDC_IDC_SET_LEADCHNL_RA_PACING_ANODE_ELECTRODE_1: NORMAL
MDC_IDC_SET_LEADCHNL_RA_PACING_ANODE_LOCATION_1: NORMAL
MDC_IDC_SET_LEADCHNL_RA_PACING_CAPTURE_MODE: NORMAL
MDC_IDC_SET_LEADCHNL_RA_PACING_CATHODE_ELECTRODE_1: NORMAL
MDC_IDC_SET_LEADCHNL_RA_PACING_CATHODE_LOCATION_1: NORMAL
MDC_IDC_SET_LEADCHNL_RA_PACING_POLARITY: NORMAL
MDC_IDC_SET_LEADCHNL_RA_PACING_PULSEWIDTH: 0.4 MS
MDC_IDC_SET_LEADCHNL_RA_SENSING_ANODE_ELECTRODE_1: NORMAL
MDC_IDC_SET_LEADCHNL_RA_SENSING_ANODE_LOCATION_1: NORMAL
MDC_IDC_SET_LEADCHNL_RA_SENSING_CATHODE_ELECTRODE_1: NORMAL
MDC_IDC_SET_LEADCHNL_RA_SENSING_CATHODE_LOCATION_1: NORMAL
MDC_IDC_SET_LEADCHNL_RA_SENSING_POLARITY: NORMAL
MDC_IDC_SET_LEADCHNL_RA_SENSING_SENSITIVITY: 0.45 MV
MDC_IDC_SET_LEADCHNL_RV_PACING_AMPLITUDE: 2 V
MDC_IDC_SET_LEADCHNL_RV_PACING_ANODE_ELECTRODE_1: NORMAL
MDC_IDC_SET_LEADCHNL_RV_PACING_ANODE_LOCATION_1: NORMAL
MDC_IDC_SET_LEADCHNL_RV_PACING_CAPTURE_MODE: NORMAL
MDC_IDC_SET_LEADCHNL_RV_PACING_CATHODE_ELECTRODE_1: NORMAL
MDC_IDC_SET_LEADCHNL_RV_PACING_CATHODE_LOCATION_1: NORMAL
MDC_IDC_SET_LEADCHNL_RV_PACING_POLARITY: NORMAL
MDC_IDC_SET_LEADCHNL_RV_PACING_PULSEWIDTH: 0.4 MS
MDC_IDC_SET_LEADCHNL_RV_SENSING_ANODE_ELECTRODE_1: NORMAL
MDC_IDC_SET_LEADCHNL_RV_SENSING_ANODE_LOCATION_1: NORMAL
MDC_IDC_SET_LEADCHNL_RV_SENSING_CATHODE_ELECTRODE_1: NORMAL
MDC_IDC_SET_LEADCHNL_RV_SENSING_CATHODE_LOCATION_1: NORMAL
MDC_IDC_SET_LEADCHNL_RV_SENSING_POLARITY: NORMAL
MDC_IDC_SET_LEADCHNL_RV_SENSING_SENSITIVITY: 0.3 MV
MDC_IDC_SET_ZONE_DETECTION_BEATS_DENOMINATOR: 40 {BEATS}
MDC_IDC_SET_ZONE_DETECTION_BEATS_NUMERATOR: 30 {BEATS}
MDC_IDC_SET_ZONE_DETECTION_INTERVAL: 240 MS
MDC_IDC_SET_ZONE_DETECTION_INTERVAL: 320 MS
MDC_IDC_SET_ZONE_DETECTION_INTERVAL: 360 MS
MDC_IDC_SET_ZONE_DETECTION_INTERVAL: 400 MS
MDC_IDC_SET_ZONE_DETECTION_INTERVAL: 400 MS
MDC_IDC_SET_ZONE_TYPE: NORMAL
MDC_IDC_STAT_AT_BURDEN_PERCENT: 0 %
MDC_IDC_STAT_AT_DTM_END: NORMAL
MDC_IDC_STAT_AT_DTM_START: NORMAL
MDC_IDC_STAT_BRADY_AP_VP_PERCENT: 1.96 %
MDC_IDC_STAT_BRADY_AP_VS_PERCENT: 0.05 %
MDC_IDC_STAT_BRADY_AS_VP_PERCENT: 93.24 %
MDC_IDC_STAT_BRADY_AS_VS_PERCENT: 4.74 %
MDC_IDC_STAT_BRADY_DTM_END: NORMAL
MDC_IDC_STAT_BRADY_DTM_START: NORMAL
MDC_IDC_STAT_BRADY_RA_PERCENT_PACED: 1.99 %
MDC_IDC_STAT_BRADY_RV_PERCENT_PACED: 2.1 %
MDC_IDC_STAT_CRT_DTM_END: NORMAL
MDC_IDC_STAT_CRT_DTM_START: NORMAL
MDC_IDC_STAT_CRT_LV_PERCENT_PACED: 91.67 %
MDC_IDC_STAT_CRT_PERCENT_PACED: 91.67 %
MDC_IDC_STAT_EPISODE_RECENT_COUNT: 0
MDC_IDC_STAT_EPISODE_RECENT_COUNT: 6
MDC_IDC_STAT_EPISODE_RECENT_COUNT_DTM_END: NORMAL
MDC_IDC_STAT_EPISODE_RECENT_COUNT_DTM_START: NORMAL
MDC_IDC_STAT_EPISODE_TOTAL_COUNT: 0
MDC_IDC_STAT_EPISODE_TOTAL_COUNT: 1
MDC_IDC_STAT_EPISODE_TOTAL_COUNT: 12
MDC_IDC_STAT_EPISODE_TOTAL_COUNT: 14
MDC_IDC_STAT_EPISODE_TOTAL_COUNT: 164
MDC_IDC_STAT_EPISODE_TOTAL_COUNT_DTM_END: NORMAL
MDC_IDC_STAT_EPISODE_TOTAL_COUNT_DTM_START: NORMAL
MDC_IDC_STAT_EPISODE_TYPE: NORMAL
MDC_IDC_STAT_TACHYTHERAPY_ATP_DELIVERED_RECENT: 0
MDC_IDC_STAT_TACHYTHERAPY_ATP_DELIVERED_TOTAL: 13
MDC_IDC_STAT_TACHYTHERAPY_RECENT_DTM_END: NORMAL
MDC_IDC_STAT_TACHYTHERAPY_RECENT_DTM_START: NORMAL
MDC_IDC_STAT_TACHYTHERAPY_SHOCKS_ABORTED_RECENT: 0
MDC_IDC_STAT_TACHYTHERAPY_SHOCKS_ABORTED_TOTAL: 1
MDC_IDC_STAT_TACHYTHERAPY_SHOCKS_DELIVERED_RECENT: 0
MDC_IDC_STAT_TACHYTHERAPY_SHOCKS_DELIVERED_TOTAL: 2
MDC_IDC_STAT_TACHYTHERAPY_TOTAL_DTM_END: NORMAL
MDC_IDC_STAT_TACHYTHERAPY_TOTAL_DTM_START: NORMAL

## 2019-10-03 ENCOUNTER — ANTICOAGULATION THERAPY VISIT (OUTPATIENT)
Dept: ANTICOAGULATION | Facility: CLINIC | Age: 64
End: 2019-10-03

## 2019-10-03 DIAGNOSIS — Z95.811 LVAD (LEFT VENTRICULAR ASSIST DEVICE) PRESENT (H): ICD-10-CM

## 2019-10-03 DIAGNOSIS — Z79.01 LONG TERM CURRENT USE OF ANTICOAGULANT THERAPY: ICD-10-CM

## 2019-10-03 LAB — INR PPP: 1.87 (ref 0.86–1.14)

## 2019-10-03 PROCEDURE — 85610 PROTHROMBIN TIME: CPT | Performed by: INTERNAL MEDICINE

## 2019-10-03 PROCEDURE — 36415 COLL VENOUS BLD VENIPUNCTURE: CPT | Performed by: INTERNAL MEDICINE

## 2019-10-03 NOTE — PROGRESS NOTES
ANTICOAGULATION FOLLOW-UP CLINIC VISIT    Patient Name:  Jim Willingham  Date:  10/3/2019  Contact Type:  Telephone    SUBJECTIVE:  Patient Findings     Comments:   Continues 120mg Lovenox injections bid until INR is within goal range.        Clinical Outcomes     Comments:   Continues 120mg Lovenox injections bid until INR is within goal range.           OBJECTIVE    INR   Date Value Ref Range Status   10/03/2019 1.87 (H) 0.86 - 1.14 Final       ASSESSMENT / PLAN  INR assessment SUB    Recheck INR In: 1 DAY    INR Location Clinic      Anticoagulation Summary  As of 10/3/2019    INR goal:   2.0-3.0   TTR:   80.1 % (2.2 y)   INR used for dosin.87! (10/3/2019)   Warfarin maintenance plan:   No maintenance plan   Full warfarin instructions:   10/3: 10 mg   Plan last modified:   Maru Alonso RN (2019)   Next INR check:   10/4/2019   Priority:   INR   Target end date:       Indications    LVAD (left ventricular assist device) present (H) [Z95.811]  Long-term (current) use of anticoagulants [Z79.01] [Z79.01]             Anticoagulation Episode Summary     INR check location:       Preferred lab:       Send INR reminders to:   [facility]    Comments:   HIPPA Forms mailed 17  Labs drawn either at [facility] or [facility]  LVAD placed 17   II  ASA 81 mg daily      Anticoagulation Care Providers     Provider Role Specialty Phone number    Delisa Montgomery MD [facility] Cardiology [phone]            See the Encounter Report to view Anticoagulation Flowsheet and Dosing Calendar (Go to Encounters tab in chart review, and find the Anticoagulation Therapy Visit)    INR/CFX/F2 RESULT: INR result is 1.87 today    ASSESSMENT:continue on Lovenox.  Patient reports bruising at sites on abdomen.    DOSING ADJUSTMENT: Recommended 10mg one time today.  Discussed with Pharmacist Jae Bedolla [facility] for patient's new Anticoagulation recommendation.    NEXT INR/FACTOR X OR FACTOR II:  10/4    PROTOCOL FOLLOWED: LVAD  HM 2 placed 6/19/17    ASA at 325mg until INR is therapeutic.    Brendan Montoya, RN

## 2019-10-04 ENCOUNTER — ANTICOAGULATION THERAPY VISIT (OUTPATIENT)
Dept: ANTICOAGULATION | Facility: CLINIC | Age: 64
End: 2019-10-04

## 2019-10-04 DIAGNOSIS — Z79.01 LONG TERM CURRENT USE OF ANTICOAGULANT THERAPY: ICD-10-CM

## 2019-10-04 DIAGNOSIS — Z95.811 LVAD (LEFT VENTRICULAR ASSIST DEVICE) PRESENT (H): ICD-10-CM

## 2019-10-04 LAB — INR PPP: 1.84 (ref 0.86–1.14)

## 2019-10-04 PROCEDURE — 85610 PROTHROMBIN TIME: CPT | Performed by: INTERNAL MEDICINE

## 2019-10-04 PROCEDURE — 36415 COLL VENOUS BLD VENIPUNCTURE: CPT | Performed by: INTERNAL MEDICINE

## 2019-10-04 NOTE — PROGRESS NOTES
ANTICOAGULATION FOLLOW-UP CLINIC VISIT    Patient Name:  Jim Willingham  Date:  10/4/2019  Contact Type:  Telephone    SUBJECTIVE:         OBJECTIVE    INR   Date Value Ref Range Status   10/04/2019 1.84 (H) 0.86 - 1.14 Final       ASSESSMENT / PLAN  INR assessment SUB    Recheck INR In: 3 DAYS    INR Location Clinic      Anticoagulation Summary  As of 10/4/2019    INR goal:   2.0-3.0   TTR:   80.0 % (2.2 y)   INR used for dosin.84! (10/4/2019)   Warfarin maintenance plan:   No maintenance plan   Full warfarin instructions:   10/4: 12.5 mg; 10/5: 10 mg; 10/6: 10 mg   Plan last modified:   Maru Alonso RN (2019)   Next INR check:   10/7/2019   Priority:   INR   Target end date:       Indications    LVAD (left ventricular assist device) present (H) [Z95.811]  Long-term (current) use of anticoagulants [Z79.01] [Z79.01]             Anticoagulation Episode Summary     INR check location:       Preferred lab:       Send INR reminders to:   [facility]    Comments:   HIPPA Forms mailed 17  Labs drawn either at [facility] or [facility]  LVAD placed 17   II  ASA 81 mg daily      Anticoagulation Care Providers     Provider Role Specialty Phone number    [place] LorenzoDelisa travis MD [facility] Cardiology [phone]            See the Encounter Report to view Anticoagulation Flowsheet and Dosing Calendar (Go to Encounters tab in chart review, and find the Anticoagulation Therapy Visit)  Spoke with patient.  Patient had LVAD placed on:   2017  Type of LVAD: HM2  Patient's current Aspirin dose: 81mg  LVAD Protocol followed:  Yes  Lovenox 120mg every 12 hours.  Pt has 4 more syringes, and then said that he wants to be done with lovenox.  Delisa Hillman RN

## 2019-10-07 ENCOUNTER — ANTICOAGULATION THERAPY VISIT (OUTPATIENT)
Dept: ANTICOAGULATION | Facility: CLINIC | Age: 64
End: 2019-10-07

## 2019-10-07 DIAGNOSIS — Z79.01 LONG TERM CURRENT USE OF ANTICOAGULANT THERAPY: ICD-10-CM

## 2019-10-07 DIAGNOSIS — Z95.811 LVAD (LEFT VENTRICULAR ASSIST DEVICE) PRESENT (H): ICD-10-CM

## 2019-10-07 LAB — INR PPP: 1.98 (ref 0.86–1.14)

## 2019-10-07 PROCEDURE — 36415 COLL VENOUS BLD VENIPUNCTURE: CPT | Performed by: INTERNAL MEDICINE

## 2019-10-07 PROCEDURE — 85610 PROTHROMBIN TIME: CPT | Performed by: INTERNAL MEDICINE

## 2019-10-07 NOTE — PROGRESS NOTES
ANTICOAGULATION FOLLOW-UP CLINIC VISIT    Patient Name:  Jim Willingham  Date:  10/7/2019  Contact Type:  Telephone    SUBJECTIVE:  Patient Findings     Comments:   Spoke with Jim.  Unexplained sub therapeutic INR.  Jim took his last dose of lovenox last night and refuses to use anymore.  He states he has bruising on his abdomen.  He will take an increased dose of warfarin today.    He has been drinking some Monster Drinks.          Clinical Outcomes     Comments:   Spoke with Jim.  Unexplained sub therapeutic INR.  Jim took his last dose of lovenox last night and refuses to use anymore.  He states he has bruising on his abdomen.  He will take an increased dose of warfarin today.    He has been drinking some Monster Drinks.             OBJECTIVE    INR   Date Value Ref Range Status   10/07/2019 1.98 (H) 0.86 - 1.14 Final       ASSESSMENT / PLAN  INR assessment SUB    Recheck INR In: 2 DAYS    INR Location Clinic      Anticoagulation Summary  As of 10/7/2019    INR goal:   2.0-3.0   TTR:   79.7 % (2.2 y)   INR used for dosin.98! (10/7/2019)   Warfarin maintenance plan:   No maintenance plan   Full warfarin instructions:   10/7: 12.5 mg; 10/8: 10 mg   Plan last modified:   Maru Alonso RN (2019)   Next INR check:   10/9/2019   Priority:   INR   Target end date:       Indications    LVAD (left ventricular assist device) present (H) [Z95.811]  Long-term (current) use of anticoagulants [Z79.01] [Z79.01]             Anticoagulation Episode Summary     INR check location:       Preferred lab:       Send INR reminders to:    [facility]    Comments:   HIPPA Forms mailed 17  Labs drawn either at [facility] or [facility]  LVAD placed 17   II  ASA 81 mg daily      Anticoagulation Care Providers     Provider Role Specialty Phone number    Delisa Montgomery MD [facility] Cardiology [phone]            See the Encounter Report to view Anticoagulation Flowsheet and  Dosing Calendar (Go to Encounters tab in chart review, and find the Anticoagulation Therapy Visit)    Spoke with Jim.      Patient had LVAD placed on:   6/19/17  Type of LVAD: HM 2  Patient's current Aspirin dose: 81 mg daily  LVAD Protocol followed: Yes     Kirsty Bermudez RN

## 2019-10-09 ENCOUNTER — ANTICOAGULATION THERAPY VISIT (OUTPATIENT)
Dept: ANTICOAGULATION | Facility: CLINIC | Age: 64
End: 2019-10-09

## 2019-10-09 DIAGNOSIS — Z79.01 LONG TERM CURRENT USE OF ANTICOAGULANT THERAPY: ICD-10-CM

## 2019-10-09 DIAGNOSIS — Z95.811 LVAD (LEFT VENTRICULAR ASSIST DEVICE) PRESENT (H): ICD-10-CM

## 2019-10-09 LAB — INR PPP: 1.84 (ref 0.86–1.14)

## 2019-10-09 PROCEDURE — 85610 PROTHROMBIN TIME: CPT | Performed by: INTERNAL MEDICINE

## 2019-10-09 PROCEDURE — 36415 COLL VENOUS BLD VENIPUNCTURE: CPT | Performed by: INTERNAL MEDICINE

## 2019-10-09 NOTE — PROGRESS NOTES
ANTICOAGULATION FOLLOW-UP CLINIC VISIT    Patient Name:  Jim Willingham  Date:  10/9/2019  Contact Type:  Telephone    SUBJECTIVE:  Patient Findings     Positives:   Change in diet/appetite (Had a Monster Drink this am.  These contain ginseng which decreases an INR.)             OBJECTIVE    INR   Date Value Ref Range Status   10/09/2019 1.84 (H) 0.86 - 1.14 Final       ASSESSMENT / PLAN  INR assessment SUB    Recheck INR In: 1 WEEK    INR Location Clinic      Anticoagulation Summary  As of 10/9/2019    INR goal:   2.0-3.0   TTR:   79.5 % (2.3 y)   INR used for dosin.84! (10/9/2019)   Warfarin maintenance plan:   No maintenance plan   Full warfarin instructions:   10/9: 12.5 mg; 10/10: 10 mg; 10/11: 12.5 mg; 10/12: 12.5 mg; 10/13: 10 mg; 10/14: 12.5 mg; 10/15: 10 mg   Plan last modified:   Maru Alonso RN (2019)   Next INR check:   10/16/2019   Priority:   INR   Target end date:       Indications    LVAD (left ventricular assist device) present (H) [Z95.811]  Long-term (current) use of anticoagulants [Z79.01] [Z79.01]             Anticoagulation Episode Summary     INR check location:       Preferred lab:       Send INR reminders to:   [facility]    Comments:   HIPPA Forms mailed 17  Labs drawn either at [facility] or [facility]  LVAD placed 17   II  ASA 81 mg daily      Anticoagulation Care Providers     Provider Role Specialty Phone number    Delisa Montgomery MD [facility] Cardiology [phone]            See the Encounter Report to view Anticoagulation Flowsheet and Dosing Calendar (Go to Encounters tab in chart review, and find the Anticoagulation Therapy Visit)  Spoke with patient.  Patient had LVAD placed on:   17  Type of LVAD: HM2  Patient's current Aspirin dose: 81mg  LVAD Protocol followed: no    If Not Followed Explanation:  Pt said he was in every other day last week , for an INR, and he is tired of that.  It was decided that his next INR will  be on 10/16.      Delisa Hillman RN

## 2019-10-16 ENCOUNTER — ANTICOAGULATION THERAPY VISIT (OUTPATIENT)
Dept: ANTICOAGULATION | Facility: CLINIC | Age: 64
End: 2019-10-16

## 2019-10-16 DIAGNOSIS — Z79.01 LONG TERM CURRENT USE OF ANTICOAGULANT THERAPY: ICD-10-CM

## 2019-10-16 DIAGNOSIS — Z95.811 LVAD (LEFT VENTRICULAR ASSIST DEVICE) PRESENT (H): ICD-10-CM

## 2019-10-16 LAB — INR PPP: 2.06 (ref 0.86–1.14)

## 2019-10-16 PROCEDURE — 36415 COLL VENOUS BLD VENIPUNCTURE: CPT | Performed by: INTERNAL MEDICINE

## 2019-10-16 PROCEDURE — 85610 PROTHROMBIN TIME: CPT | Performed by: INTERNAL MEDICINE

## 2019-10-16 NOTE — PROGRESS NOTES
ANTICOAGULATION FOLLOW-UP CLINIC VISIT    Patient Name:  Jim Willingham  Date:  10/16/2019  Contact Type:  Telephone    SUBJECTIVE:         OBJECTIVE    INR   Date Value Ref Range Status   10/16/2019 2.06 (H) 0.86 - 1.14 Final       ASSESSMENT / PLAN  INR assessment THER    Recheck INR In: 1 WEEK    INR Location Clinic      Anticoagulation Summary  As of 10/16/2019    INR goal:   2.0-3.0   TTR:   79.1 % (2.3 y)   INR used for dosin.06 (10/16/2019)   Warfarin maintenance plan:   No maintenance plan   Full warfarin instructions:   10/16: 12.5 mg; 10/17: 10 mg; 10/18: 12.5 mg; 10/19: 12.5 mg; 10/20: 10 mg; 10/21: 12.5 mg; 10/22: 10 mg   Plan last modified:   Maru Alonso RN (2019)   Next INR check:   10/23/2019   Priority:   INR   Target end date:       Indications    LVAD (left ventricular assist device) present (H) [Z95.811]  Long-term (current) use of anticoagulants [Z79.01] [Z79.01]             Anticoagulation Episode Summary     INR check location:       Preferred lab:       Send INR reminders to:   [facility]    Comments:   HIPPA Forms mailed 17  Labs drawn either at [facility] or [facility]  LVAD placed 17   II  ASA 81 mg daily      Anticoagulation Care Providers     Provider Role Specialty Phone number    Delisa Montgomery MD [facility] Cardiology [phone]            See the Encounter Report to view Anticoagulation Flowsheet and Dosing Calendar (Go to Encounters tab in chart review, and find the Anticoagulation Therapy Visit)    Spoke with patient. Gave them their lab results and new warfarin recommendation.  No changes in health, medication, or diet. No missed doses, no falls. No signs or symptoms of bleed or clotting.     Patient had LVAD placed on:   17  Type of LVAD: HM 2  Patient's current Aspirin dose: ASA 81mg Daily  LVAD Protocol followed: Yes   If Not Followed Explanation:  N/A    Will have pt continue 12.5mg MWFSa and 10mg TuThSu    Radha  Willis, RN

## 2019-10-23 ENCOUNTER — ANTICOAGULATION THERAPY VISIT (OUTPATIENT)
Dept: ANTICOAGULATION | Facility: CLINIC | Age: 64
End: 2019-10-23

## 2019-10-23 DIAGNOSIS — Z95.811 LVAD (LEFT VENTRICULAR ASSIST DEVICE) PRESENT (H): ICD-10-CM

## 2019-10-23 DIAGNOSIS — Z79.01 LONG TERM CURRENT USE OF ANTICOAGULANT THERAPY: ICD-10-CM

## 2019-10-23 LAB — INR PPP: 3.83 (ref 0.86–1.14)

## 2019-10-23 PROCEDURE — 85610 PROTHROMBIN TIME: CPT | Performed by: INTERNAL MEDICINE

## 2019-10-23 PROCEDURE — 36415 COLL VENOUS BLD VENIPUNCTURE: CPT | Performed by: INTERNAL MEDICINE

## 2019-10-23 NOTE — PROGRESS NOTES
ANTICOAGULATION FOLLOW-UP CLINIC VISIT    Patient Name:  Jim Willingham  Date:  10/23/2019  Contact Type:  Telephone    SUBJECTIVE:  Patient Findings     Comments:   Patient had LVAD placed on:   6/19/17  Type of LVAD: HM2*  Patient's current Aspirin dose: 81mg daily  LVAD Protocol followed: Yes    If Not Followed Explanation:  NA        Clinical Outcomes     Comments:   Patient had LVAD placed on:   6/19/17  Type of LVAD: HM2*  Patient's current Aspirin dose: 81mg daily  LVAD Protocol followed: Yes    If Not Followed Explanation:  NA           OBJECTIVE    INR   Date Value Ref Range Status   10/23/2019 3.83 (H) 0.86 - 1.14 Final       ASSESSMENT / PLAN  No question data found.  Anticoagulation Summary  As of 10/23/2019    INR goal:   2.0-3.0   TTR:   78.9 % (2.3 y)   INR used for dosing:   3.83! (10/23/2019)   Warfarin maintenance plan:   No maintenance plan   Full warfarin instructions:   10/23: 10 mg; 10/24: 10 mg; 10/25: 10 mg; 10/26: 10 mg; 10/27: 10 mg   Plan last modified:   Maru Alonso RN (9/19/2019)   Next INR check:   10/28/2019   Priority:   INR   Target end date:       Indications    LVAD (left ventricular assist device) present (H) [Z95.811]  Long-term (current) use of anticoagulants [Z79.01] [Z79.01]             Anticoagulation Episode Summary     INR check location:       Preferred lab:       Send INR reminders to:   [facility]    Comments:   HIPPA Forms mailed 6/26/17  Labs drawn either at [facility] or [facility]  LVAD placed 6/19/17  HM II  ASA 81 mg daily      Anticoagulation Care Providers     Provider Role Specialty Phone number    Delisa Montgomery MD Responsible Cardiology [phone]            See the Encounter Report to view Anticoagulation Flowsheet and Dosing Calendar (Go to Encounters tab in chart review, and find the Anticoagulation Therapy Visit)    Spoke with patient.     Maru Alonso RN

## 2019-10-27 ENCOUNTER — HEALTH MAINTENANCE LETTER (OUTPATIENT)
Age: 64
End: 2019-10-27

## 2019-10-28 ENCOUNTER — ANTICOAGULATION THERAPY VISIT (OUTPATIENT)
Dept: ANTICOAGULATION | Facility: CLINIC | Age: 64
End: 2019-10-28

## 2019-10-28 DIAGNOSIS — Z79.01 LONG TERM CURRENT USE OF ANTICOAGULANT THERAPY: ICD-10-CM

## 2019-10-28 DIAGNOSIS — Z95.811 LVAD (LEFT VENTRICULAR ASSIST DEVICE) PRESENT (H): ICD-10-CM

## 2019-10-28 LAB — INR PPP: 3.29 (ref 0.86–1.14)

## 2019-10-28 PROCEDURE — 85610 PROTHROMBIN TIME: CPT | Performed by: INTERNAL MEDICINE

## 2019-10-28 PROCEDURE — 36415 COLL VENOUS BLD VENIPUNCTURE: CPT | Performed by: INTERNAL MEDICINE

## 2019-10-28 NOTE — PROGRESS NOTES
ANTICOAGULATION FOLLOW-UP CLINIC VISIT    Patient Name:  Jim Willingham  Date:  10/28/2019  Contact Type:  Telephone    SUBJECTIVE:  Patient Findings     Comments:   Previously pt was good on the maintenance dose of 10mg MWF and 7.5mg TuThSaSu. Previously pt was drinking [employer] drinks which contains which contains ginseng (decreases an INR).        Clinical Outcomes     Negatives:   Major bleeding event, Thromboembolic event, Anticoagulation-related hospital admission, Anticoagulation-related ED visit, Anticoagulation-related fatality    Comments:   Previously pt was good on the maintenance dose of 10mg MWF and 7.5mg TuThSaSu. Previously pt was drinking [employer] drinks which contains which contains ginseng (decreases an INR).           OBJECTIVE    INR   Date Value Ref Range Status   10/28/2019 3.29 (H) 0.86 - 1.14 Final       ASSESSMENT / PLAN  INR assessment SUPRA    Recheck INR In: 3 DAYS    INR Location Clinic      Anticoagulation Summary  As of 10/28/2019    INR goal:   2.0-3.0   TTR:   78.4 % (2.3 y)   INR used for dosing:   3.29! (10/28/2019)   Warfarin maintenance plan:   No maintenance plan   Full warfarin instructions:   10/28: 7.5 mg; 10/29: 7.5 mg; 10/30: 10 mg   Plan last modified:   Maru Alonso RN (9/19/2019)   Next INR check:   10/31/2019   Priority:   INR   Target end date:       Indications    LVAD (left ventricular assist device) present (H) [Z95.811]  Long-term (current) use of anticoagulants [Z79.01] [Z79.01]             Anticoagulation Episode Summary     INR check location:       Preferred lab:       Send INR reminders to:   [facility]    Comments:   HIPPA Forms mailed 6/26/17  Labs drawn either at [facility] or [facility]  LVAD placed 6/19/17   II  ASA 81 mg daily      Anticoagulation Care Providers     Provider Role Specialty Phone number    Delisa Montgomery MD Responsible Cardiology [phone]            See the Encounter Report to view  Anticoagulation Flowsheet and Dosing Calendar (Go to Encounters tab in chart review, and find the Anticoagulation Therapy Visit)    Spoke with patient. Gave them their lab results and new warfarin recommendation.  No changes in health, medication, or diet. No missed doses, no falls. No signs or symptoms of bleed or clotting.     Patient had LVAD placed on:   6/19/17  Type of LVAD: HM 2  Patient's current Aspirin dose: ASA 81mg Daily  LVAD Protocol followed: Yes   If Not Followed Explanation:  N/A    Radha Pedro RN

## 2019-10-31 ENCOUNTER — ANTICOAGULATION THERAPY VISIT (OUTPATIENT)
Dept: ANTICOAGULATION | Facility: CLINIC | Age: 64
End: 2019-10-31

## 2019-10-31 DIAGNOSIS — Z95.811 LVAD (LEFT VENTRICULAR ASSIST DEVICE) PRESENT (H): ICD-10-CM

## 2019-10-31 DIAGNOSIS — Z79.01 LONG TERM CURRENT USE OF ANTICOAGULANT THERAPY: ICD-10-CM

## 2019-10-31 LAB — INR PPP: 1.73 (ref 0.86–1.14)

## 2019-10-31 PROCEDURE — 36415 COLL VENOUS BLD VENIPUNCTURE: CPT | Performed by: INTERNAL MEDICINE

## 2019-10-31 PROCEDURE — 85610 PROTHROMBIN TIME: CPT | Performed by: INTERNAL MEDICINE

## 2019-10-31 NOTE — PROGRESS NOTES
ANTICOAGULATION FOLLOW-UP CLINIC VISIT    Patient Name:  Jim Willingham  Date:  10/31/2019  Contact Type:  Telephone    SUBJECTIVE:  Patient Findings     Comments:   A maintenance plan is established with approval of [facility].  LVAD coordinator is paged is see if the protocol can be deviated & get the next INR in 96 hr.  Per MD, we are to follow the protocol, therefore the next INR will be done on Sat., .  Ofelia will inform the coordinator on call to expect this result.  Pt is aware directions over the weekend will come from the LVAD team.         Clinical Outcomes     Comments:   A maintenance plan is established with approval of [facility].  LVAD coordinator is paged is see if the protocol can be deviated & get the next INR in 96 hr.  Per MD, we are to follow the protocol, therefore the next INR will be done on Sat., .  Ofelia will inform the coordinator on call to expect this result.  Pt is aware directions over the weekend will come from the LVAD team.            OBJECTIVE    INR   Date Value Ref Range Status   10/31/2019 1.73 (H) 0.86 - 1.14 Final       ASSESSMENT / PLAN  INR assessment SUB    Recheck INR In: 2 DAYS    INR Location Clinic      Anticoagulation Summary  As of 10/31/2019    INR goal:   2.0-3.0   TTR:   78.4 % (2.3 y)   INR used for dosin.73! (10/31/2019)   Warfarin maintenance plan:   7.5 mg (5 mg x 1.5) every Tue, Sat; 10 mg (5 mg x 2) all other days   Full warfarin instructions:   7.5 mg every Tue, Sat; 10 mg all other days   Weekly warfarin total:   65 mg   Plan last modified:   Suyapa Crawford RN (10/31/2019)   Next INR check:   2019   Priority:   INR   Target end date:       Indications    LVAD (left ventricular assist device) present (H) [Z95.811]  Long-term (current) use of anticoagulants [Z79.01] [Z79.01]             Anticoagulation Episode Summary     INR check location:       Preferred lab:       Send INR reminders to:   [facility]    Comments:   HIPPA Forms mailed  6/26/17  Labs drawn either at [facility] or [facility]  LVAD placed 6/19/17   II  ASA 81 mg daily      Anticoagulation Care Providers     Provider Role Specialty Phone number    Delisa Montgomery MD Responsible Cardiology [phone]            See the Encounter Report to view Anticoagulation Flowsheet and Dosing Calendar (Go to Encounters tab in chart review, and find the Anticoagulation Therapy Visit)    See above notation     Suyapa Crawford RN

## 2019-11-02 ENCOUNTER — CARE COORDINATION (OUTPATIENT)
Dept: CARDIOLOGY | Facility: CLINIC | Age: 64
End: 2019-11-02

## 2019-11-02 NOTE — PROGRESS NOTES
D: Pt was instructed to get INR drawn today.  I/A: No INR resulted today. VAD Coordinator on-call called pt asking if he is going to get INR drawn. Pt didn't answer. Left .  P: In , instructed pt to call VAD Coordinator on-call if gets INR checked.

## 2019-11-04 ENCOUNTER — ANTICOAGULATION THERAPY VISIT (OUTPATIENT)
Dept: ANTICOAGULATION | Facility: CLINIC | Age: 64
End: 2019-11-04

## 2019-11-04 DIAGNOSIS — Z79.01 LONG TERM CURRENT USE OF ANTICOAGULANT THERAPY: ICD-10-CM

## 2019-11-04 DIAGNOSIS — Z95.811 LVAD (LEFT VENTRICULAR ASSIST DEVICE) PRESENT (H): ICD-10-CM

## 2019-11-04 LAB — INR PPP: 2.41 (ref 0.86–1.14)

## 2019-11-04 PROCEDURE — 85610 PROTHROMBIN TIME: CPT | Performed by: INTERNAL MEDICINE

## 2019-11-04 PROCEDURE — 36415 COLL VENOUS BLD VENIPUNCTURE: CPT | Performed by: INTERNAL MEDICINE

## 2019-11-04 NOTE — PROGRESS NOTES
ANTICOAGULATION FOLLOW-UP CLINIC VISIT    Patient Name:  Jim Willingham  Date:  2019  Contact Type:  Telephone    SUBJECTIVE:  Patient Findings     Comments:   Spoke with Jim.  He developed cold symptoms over the weekend and did not feel well on Saturday, so had INR checked today.  He states he has been avoiding greens--explained to him that he needs some greens.  Jim plans to eat a serving of green vegetables a couple of times a week.        Clinical Outcomes     Comments:   Spoke with Jim.  He developed cold symptoms over the weekend and did not feel well on Saturday, so had INR checked today.  He states he has been avoiding greens--explained to him that he needs some greens.  Jim plans to eat a serving of green vegetables a couple of times a week.           OBJECTIVE    INR   Date Value Ref Range Status   2019 2.41 (H) 0.86 - 1.14 Final       ASSESSMENT / PLAN  INR assessment THER    Recheck INR In: 4 DAYS    INR Location Clinic      Anticoagulation Summary  As of 2019    INR goal:   2.0-3.0   TTR:   78.3 % (2.3 y)   INR used for dosin.41 (2019)   Warfarin maintenance plan:   7.5 mg (5 mg x 1.5) every Tue, Sat; 10 mg (5 mg x 2) all other days   Full warfarin instructions:   : 7.5 mg; : 10 mg; : 7.5 mg; Otherwise 7.5 mg every Tue, Sat; 10 mg all other days   Weekly warfarin total:   65 mg   Plan last modified:   Suyapa Crawford RN (10/31/2019)   Next INR check:   2019   Priority:   INR   Target end date:       Indications    LVAD (left ventricular assist device) present (H) [Z95.811]  Long-term (current) use of anticoagulants [Z79.01] [Z79.01]             Anticoagulation Episode Summary     INR check location:       Preferred lab:       Send INR reminders to:   [facility]    Comments:   HIPPA Forms mailed 17  Labs drawn either at [facility] or [facility]  LVAD placed 17   II  ASA 81 mg daily      Anticoagulation Care Providers      Provider Role Specialty Phone number    GatewoodDelisa MD Responsible Cardiology [phone]            See the Encounter Report to view Anticoagulation Flowsheet and Dosing Calendar (Go to Encounters tab in chart review, and find the Anticoagulation Therapy Visit)    Spoke with Jim.  He has not been drinking [employer] drinks lately, drinking coffee instead.    Patient had LVAD placed on:   6/19/17  Type of LVAD: HM 2  Patient's current Aspirin dose: 81 mg daily  LVAD Protocol followed: Yes      Kirsty Bermudez RN

## 2019-11-04 NOTE — PROGRESS NOTES
INR     Pauline Valderrama, RN routed conversation to  Anticoag Clinic 2 days ago      Pauline Valderrama, RN 2 days ago         D: Pt was instructed to get INR drawn today.  I/A: No INR resulted today. VAD Coordinator on-call called pt asking if he is going to get INR drawn. Pt didn't answer. Left .  P: In , instructed pt to call VAD Coordinator on-call if gets INR checked.

## 2019-11-07 ENCOUNTER — ANTICOAGULATION THERAPY VISIT (OUTPATIENT)
Dept: ANTICOAGULATION | Facility: CLINIC | Age: 64
End: 2019-11-07

## 2019-11-07 DIAGNOSIS — Z95.811 LVAD (LEFT VENTRICULAR ASSIST DEVICE) PRESENT (H): ICD-10-CM

## 2019-11-07 DIAGNOSIS — Z79.01 LONG TERM CURRENT USE OF ANTICOAGULANT THERAPY: ICD-10-CM

## 2019-11-07 LAB — INR PPP: 2.45 (ref 0.86–1.14)

## 2019-11-07 PROCEDURE — 85610 PROTHROMBIN TIME: CPT | Performed by: INTERNAL MEDICINE

## 2019-11-07 PROCEDURE — 36415 COLL VENOUS BLD VENIPUNCTURE: CPT | Performed by: INTERNAL MEDICINE

## 2019-11-07 NOTE — PROGRESS NOTES
ANTICOAGULATION FOLLOW-UP CLINIC VISIT    Patient Name:  Jim Willingham  Date:  2019  Contact Type:  Telephone    SUBJECTIVE:         OBJECTIVE    INR   Date Value Ref Range Status   2019 2.45 (H) 0.86 - 1.14 Final       ASSESSMENT / PLAN  INR assessment THER    Recheck INR In: 1 WEEK    INR Location Clinic      Anticoagulation Summary  As of 2019    INR goal:   2.0-3.0   TTR:   78.4 % (2.3 y)   INR used for dosin.45 (2019)   Warfarin maintenance plan:   7.5 mg (5 mg x 1.5) every Tue, Sat; 10 mg (5 mg x 2) all other days   Full warfarin instructions:   7.5 mg every Tue, Sat; 10 mg all other days   Weekly warfarin total:   65 mg   No change documented:   Suyapa Crawford RN   Plan last modified:   Suyapa Crawford RN (10/31/2019)   Next INR check:   2019   Priority:   INR   Target end date:       Indications    LVAD (left ventricular assist device) present (H) [Z95.811]  Long-term (current) use of anticoagulants [Z79.01] [Z79.01]             Anticoagulation Episode Summary     INR check location:       Preferred lab:       Send INR reminders to:   [facility]    Comments:   HIPPA Forms mailed 17  Labs drawn either at [facility] or [facility]  LVAD placed 17   II  ASA 81 mg daily      Anticoagulation Care Providers     Provider Role Specialty Phone number    Delisa Montgomery MD [facility] Cardiology [phone]            See the Encounter Report to view Anticoagulation Flowsheet and Dosing Calendar (Go to Encounters tab in chart review, and find the Anticoagulation Therapy Visit)    Spoke with patient. Gave them their lab results and new warfarin recommendation.  No changes in health, medication, or diet. No missed doses, no falls. No signs or symptoms of bleed or clotting.      Suyapa Crawford RN

## 2019-11-12 ENCOUNTER — ANTICOAGULATION THERAPY VISIT (OUTPATIENT)
Dept: ANTICOAGULATION | Facility: CLINIC | Age: 64
End: 2019-11-12

## 2019-11-12 DIAGNOSIS — Z79.01 LONG TERM CURRENT USE OF ANTICOAGULANT THERAPY: ICD-10-CM

## 2019-11-12 DIAGNOSIS — Z95.811 LVAD (LEFT VENTRICULAR ASSIST DEVICE) PRESENT (H): ICD-10-CM

## 2019-11-12 LAB — INR PPP: 4.36 (ref 0.86–1.14)

## 2019-11-12 PROCEDURE — 85610 PROTHROMBIN TIME: CPT | Performed by: INTERNAL MEDICINE

## 2019-11-12 PROCEDURE — 36415 COLL VENOUS BLD VENIPUNCTURE: CPT | Performed by: INTERNAL MEDICINE

## 2019-11-12 NOTE — PROGRESS NOTES
ANTICOAGULATION FOLLOW-UP CLINIC VISIT    19 ADDENDUM  Jim govea.  He is not feeling well today.  Will get an INR tomorrow.  Updated calendar.  Recommended 7.5mg of Coumadin tonight.  TN      Patient Name:  Jim Willingham  Date:  2019  Contact Type:  Telephone    SUBJECTIVE:         OBJECTIVE    INR   Date Value Ref Range Status   2019 4.36 (H) 0.86 - 1.14 Final     Comment:     Specimen verified by repeat testing       ASSESSMENT / PLAN  INR assessment SUPRA    Recheck INR In: 3 DAYS    INR Location Clinic      Anticoagulation Summary  As of 2019    INR goal:   2.0-3.0   TTR:   78.1 % (2.3 y)   INR used for dosin.36! (2019)   Warfarin maintenance plan:   7.5 mg (5 mg x 1.5) every Tue, Sat; 10 mg (5 mg x 2) all other days   Full warfarin instructions:   : 5 mg; : 7.5 mg; : 7.5 mg; Otherwise 7.5 mg every Tue, Sat; 10 mg all other days   Weekly warfarin total:   65 mg   Plan last modified:   Suyapa Crawford RN (10/31/2019)   Next INR check:   11/15/2019   Priority:   INR   Target end date:       Indications    LVAD (left ventricular assist device) present (H) [Z95.811]  Long-term (current) use of anticoagulants [Z79.01] [Z79.01]             Anticoagulation Episode Summary     INR check location:       Preferred lab:       Send INR reminders to:   [facility]    Comments:   HIPPA Forms mailed 17  Labs drawn either at [facility] or [facility]  LVAD placed 17   II  ASA 81 mg daily      Anticoagulation Care Providers     Provider Role Specialty Phone number    Delisa Montgomery MD Responsible Cardiology [phone]            See the Encounter Report to view Anticoagulation Flowsheet and Dosing Calendar (Go to Encounters tab in chart review, and find the Anticoagulation Therapy Visit)  Left message for patient with results and dosing recommendations. Asked patient to call back to report any missed doses, falls, signs and symptoms of  bleeding or clotting, any changes in health, medication, or diet. Asked patient to call back with any questions or concerns.  Patient had LVAD placed on:   6/19/2017  Type of LVAD: HM2  Patient's current Aspirin dose: 81mg  LVAD Protocol followed: yes   Delisa Hillman RN

## 2019-11-15 ENCOUNTER — ANTICOAGULATION THERAPY VISIT (OUTPATIENT)
Dept: ANTICOAGULATION | Facility: CLINIC | Age: 64
End: 2019-11-15

## 2019-11-15 DIAGNOSIS — Z95.811 LVAD (LEFT VENTRICULAR ASSIST DEVICE) PRESENT (H): ICD-10-CM

## 2019-11-15 DIAGNOSIS — Z79.01 LONG TERM CURRENT USE OF ANTICOAGULANT THERAPY: ICD-10-CM

## 2019-11-15 LAB — INR PPP: 2.55 (ref 0.86–1.14)

## 2019-11-15 PROCEDURE — 36415 COLL VENOUS BLD VENIPUNCTURE: CPT | Performed by: INTERNAL MEDICINE

## 2019-11-15 PROCEDURE — 85610 PROTHROMBIN TIME: CPT | Performed by: INTERNAL MEDICINE

## 2019-11-18 ENCOUNTER — ANTICOAGULATION THERAPY VISIT (OUTPATIENT)
Dept: ANTICOAGULATION | Facility: CLINIC | Age: 64
End: 2019-11-18

## 2019-11-18 DIAGNOSIS — Z95.811 LVAD (LEFT VENTRICULAR ASSIST DEVICE) PRESENT (H): ICD-10-CM

## 2019-11-18 DIAGNOSIS — Z79.01 LONG TERM CURRENT USE OF ANTICOAGULANT THERAPY: ICD-10-CM

## 2019-11-18 LAB — INR PPP: 2.86 (ref 0.86–1.14)

## 2019-11-18 PROCEDURE — 36415 COLL VENOUS BLD VENIPUNCTURE: CPT | Performed by: INTERNAL MEDICINE

## 2019-11-18 PROCEDURE — 85610 PROTHROMBIN TIME: CPT | Performed by: INTERNAL MEDICINE

## 2019-11-18 NOTE — PROGRESS NOTES
ANTICOAGULATION FOLLOW-UP CLINIC VISIT    Patient Name:  Jim Willingham  Date:  2019  Contact Type:  Telephone    SUBJECTIVE:  Patient Findings     Comments:   Jim reports no changes in health, diet, medications.  He was given a RX for prednisone on 11/10/19 but did not take any of it (it was given for him to take if he needed it).    Warfarin dosing calendar updated with warfarin doses Jim reports he took.        Clinical Outcomes     Comments:   Jim reports no changes in health, diet, medications.  He was given a RX for prednisone on 11/10/19 but did not take any of it (it was given for him to take if he needed it).    Warfarin dosing calendar updated with warfarin doses Jim reports he took.           OBJECTIVE    INR   Date Value Ref Range Status   2019 2.86 (H) 0.86 - 1.14 Final       ASSESSMENT / PLAN  INR assessment THER    Recheck INR In: 3 DAYS    INR Location Clinic      Anticoagulation Summary  As of 2019    INR goal:   2.0-3.0   TTR:   78.0 % (2.4 y)   INR used for dosin.86 (2019)   Warfarin maintenance plan:   7.5 mg (5 mg x 1.5) every Tue, Sat; 10 mg (5 mg x 2) all other days   Full warfarin instructions:   : 7.5 mg; : 10 mg; : 7.5 mg; Otherwise 7.5 mg every Tue, Sat; 10 mg all other days   Weekly warfarin total:   65 mg   Plan last modified:   Suyapa Crawford RN (10/31/2019)   Next INR check:   2019   Priority:   Critical   Target end date:       Indications    LVAD (left ventricular assist device) present (H) [Z95.811]  Long-term (current) use of anticoagulants [Z79.01] [Z79.01]             Anticoagulation Episode Summary     INR check location:       Preferred lab:       Send INR reminders to:   [facility]    Comments:   HIPPA Forms mailed 17  Labs drawn either at [facility] or [facility]  LVAD placed 17   II  ASA 81 mg daily      Anticoagulation Care Providers     Provider Role Specialty Phone number     Delisa Montgomery MD Responsible Cardiology [phone]            See the Encounter Report to view Anticoagulation Flowsheet and Dosing Calendar (Go to Encounters tab in chart review, and find the Anticoagulation Therapy Visit)    Spoke with Jim.   Discussed with Pharmacist Jae Bedolla RPH for patient's new Anticoagulation recommendation.      Patient had LVAD placed on:   6/19/17  Type of LVAD: HM 2  Patient's current Aspirin dose: 81 mg daily  LVAD Protocol followed: yes     Kirsty Bermudez RN

## 2019-11-21 ENCOUNTER — ANTICOAGULATION THERAPY VISIT (OUTPATIENT)
Dept: ANTICOAGULATION | Facility: CLINIC | Age: 64
End: 2019-11-21

## 2019-11-21 DIAGNOSIS — Z79.01 LONG TERM CURRENT USE OF ANTICOAGULANT THERAPY: ICD-10-CM

## 2019-11-21 DIAGNOSIS — Z95.811 LVAD (LEFT VENTRICULAR ASSIST DEVICE) PRESENT (H): ICD-10-CM

## 2019-11-21 LAB — INR PPP: 3.05 (ref 0.86–1.14)

## 2019-11-21 PROCEDURE — 85610 PROTHROMBIN TIME: CPT | Performed by: INTERNAL MEDICINE

## 2019-11-21 PROCEDURE — 36415 COLL VENOUS BLD VENIPUNCTURE: CPT | Performed by: INTERNAL MEDICINE

## 2019-11-21 NOTE — PROGRESS NOTES
ANTICOAGULATION FOLLOW-UP CLINIC VISIT    Patient Name:  Jim Willingham  Date:  11/21/2019  Contact Type:  Telephone    SUBJECTIVE:  Patient Findings     Comments:   Recommended patient take 7.5mg today and tomorrow.  10mg on Sat and Sun.  Check on Monday.  Denies use of Prednisone.        Clinical Outcomes     Comments:   Recommended patient take 7.5mg today and tomorrow.  10mg on Sat and Sun.  Check on Monday.  Denies use of Prednisone.           OBJECTIVE    INR   Date Value Ref Range Status   11/21/2019 3.05 (H) 0.86 - 1.14 Final       ASSESSMENT / PLAN  INR assessment THER    Recheck INR In: 4 DAYS    INR Location Clinic      Anticoagulation Summary  As of 11/21/2019    INR goal:   2.0-3.0   TTR:   77.9 % (2.4 y)   INR used for dosing:   3.05! (11/21/2019)   Warfarin maintenance plan:   No maintenance plan   Full warfarin instructions:   11/21: 7.5 mg; 11/22: 7.5 mg; 11/23: 10 mg; 11/24: 10 mg   Plan last modified:   Brendan Montoya RN (11/21/2019)   Next INR check:   11/25/2019   Priority:   Critical   Target end date:       Indications    LVAD (left ventricular assist device) present (H) [Z95.811]  Long-term (current) use of anticoagulants [Z79.01] [Z79.01]             Anticoagulation Episode Summary     INR check location:       Preferred lab:       Send INR reminders to:   [facility]    Comments:   HIPPA Forms mailed 6/26/17  Labs drawn [facility] at [facility] or [facility]  LVAD placed 6/19/17   II  ASA 81 mg daily      Anticoagulation Care Providers     Provider Role Specialty Phone number    Delisa Montgomery MD Responsible Cardiology [phone]            See the Encounter Report to view Anticoagulation Flowsheet and Dosing Calendar (Go to Encounters tab in chart review, and find the Anticoagulation Therapy Visit)    INR/CFX/F2 RESULT: INR result is 3.05 on 11/21    ASSESSMENT:No Problems found. No Changes in Health, Medications, or diet. No Signs of bruising, bleeding or  clotting.    DOSING ADJUSTMENT: Recommended 7.5mg today and tomorrow, 10mg on Sat, Sun.    NEXT INR/FACTOR X OR FACTOR II:11/25    PROTOCOL FOLLOWED:LVAD  HM 2 placed 6/19/17  ASA 81mg daily  Spoke to Brendan Burdick, RN

## 2019-11-25 ENCOUNTER — ANTICOAGULATION THERAPY VISIT (OUTPATIENT)
Dept: ANTICOAGULATION | Facility: CLINIC | Age: 64
End: 2019-11-25

## 2019-11-25 DIAGNOSIS — Z79.01 LONG TERM CURRENT USE OF ANTICOAGULANT THERAPY: ICD-10-CM

## 2019-11-25 DIAGNOSIS — Z95.811 LVAD (LEFT VENTRICULAR ASSIST DEVICE) PRESENT (H): ICD-10-CM

## 2019-11-25 LAB — INR PPP: 3.43 (ref 0.86–1.14)

## 2019-11-25 PROCEDURE — 85610 PROTHROMBIN TIME: CPT | Performed by: INTERNAL MEDICINE

## 2019-11-25 PROCEDURE — 36415 COLL VENOUS BLD VENIPUNCTURE: CPT | Performed by: INTERNAL MEDICINE

## 2019-11-25 NOTE — PROGRESS NOTES
ANTICOAGULATION FOLLOW-UP CLINIC VISIT    Patient Name:  Jim Willingham  Date:  11/25/2019  Contact Type:  Telephone    SUBJECTIVE:  Patient Findings     Comments:   Jim reports no changes in health, diet, medications.  He states he has been eating greens like he usually does, weight has been stable, not taking tylenol and he feels as though he is hydrated.  He denies any signs of bleeding.  He will watch for any signs of bleeding and be seen if he develops any.          Clinical Outcomes     Comments:   Jim reports no changes in health, diet, medications.  He states he has been eating greens like he usually does, weight has been stable, not taking tylenol and he feels as though he is hydrated.  He denies any signs of bleeding.  He will watch for any signs of bleeding and be seen if he develops any.             OBJECTIVE    INR   Date Value Ref Range Status   11/25/2019 3.43 (H) 0.86 - 1.14 Final       ASSESSMENT / PLAN  INR assessment SUPRA    Recheck INR In: 4 DAYS    INR Location Clinic      Anticoagulation Summary  As of 11/25/2019    INR goal:   2.0-3.0   TTR:   79.1 % (1 y)   INR used for dosing:   3.43! (11/25/2019)   Warfarin maintenance plan:   No maintenance plan   Full warfarin instructions:   11/25: 7.5 mg; 11/26: 7.5 mg; 11/27: 7.5 mg; 11/28: 7.5 mg   Plan last modified:   Brendan Montoya RN (11/21/2019)   Next INR check:   11/29/2019   Priority:   Critical   Target end date:       Indications    LVAD (left ventricular assist device) present (H) [Z95.811]  Long-term (current) use of anticoagulants [Z79.01] [Z79.01]             Anticoagulation Episode Summary     INR check location:       Preferred lab:       Send INR reminders to:   [facility]    Comments:   HIPPA Forms mailed 6/26/17  Labs drawn either at [facility] or [facility]  LVAD placed 6/19/17   II  ASA 81 mg daily      Anticoagulation Care Providers     Provider Role Specialty Phone number    Delisa Montgomery  MD Responsible Cardiology [phone]            See the Encounter Report to view Anticoagulation Flowsheet and Dosing Calendar (Go to Encounters tab in chart review, and find the Anticoagulation Therapy Visit)    Spoke with Jim.  See patient findings.    Patient had LVAD placed on:   6/19/17  Type of LVAD: HM 2  Patient's current Aspirin dose: 81 mg daily  LVAD Protocol followed: Yes      Kirsty Bermudez RN

## 2019-11-29 ENCOUNTER — ANTICOAGULATION THERAPY VISIT (OUTPATIENT)
Dept: ANTICOAGULATION | Facility: CLINIC | Age: 64
End: 2019-11-29

## 2019-11-29 DIAGNOSIS — Z95.811 LVAD (LEFT VENTRICULAR ASSIST DEVICE) PRESENT (H): ICD-10-CM

## 2019-11-29 DIAGNOSIS — Z79.01 LONG TERM CURRENT USE OF ANTICOAGULANT THERAPY: ICD-10-CM

## 2019-11-29 LAB — INR PPP: 3.36 (ref 0.86–1.14)

## 2019-11-29 PROCEDURE — 85610 PROTHROMBIN TIME: CPT | Performed by: INTERNAL MEDICINE

## 2019-11-29 PROCEDURE — 36415 COLL VENOUS BLD VENIPUNCTURE: CPT | Performed by: INTERNAL MEDICINE

## 2019-11-29 NOTE — PROGRESS NOTES
ANTICOAGULATION FOLLOW-UP CLINIC VISIT    Patient Name:  Jim Willingham  Date:  11/29/2019  Contact Type:  Telephone    SUBJECTIVE:         OBJECTIVE    INR   Date Value Ref Range Status   11/29/2019 3.36 (H) 0.86 - 1.14 Final       ASSESSMENT / PLAN  No question data found.  Anticoagulation Summary  As of 11/29/2019    INR goal:   2.0-3.0   TTR:   79.1 % (1 y)   INR used for dosing:   3.36! (11/29/2019)   Warfarin maintenance plan:   10 mg (5 mg x 2) every Mon; 7.5 mg (5 mg x 1.5) all other days   Full warfarin instructions:   10 mg every Mon; 7.5 mg all other days   Weekly warfarin total:   55 mg   Plan last modified:   Delisa Hillman RN (11/29/2019)   Next INR check:   12/6/2019   Priority:   Critical   Target end date:       Indications    LVAD (left ventricular assist device) present (H) [Z95.811]  Long-term (current) use of anticoagulants [Z79.01] [Z79.01]             Anticoagulation Episode Summary     INR check location:       Preferred lab:       Send INR reminders to:   [facility]    Comments:   HIPPA Forms mailed 6/26/17  Labs drawn either at [facility] or [facility]  LVAD placed 6/19/17   II  ASA 81 mg daily      Anticoagulation Care Providers     Provider Role Specialty Phone number    UlisesDelisa travis MD [facility] Cardiology [phone]            See the Encounter Report to view Anticoagulation Flowsheet and Dosing Calendar (Go to Encounters tab in chart review, and find the Anticoagulation Therapy Visit)    Spoke with patient.  Patient had LVAD placed on:   6/19/2017  Type of LVAD: HM2  Patient's current Aspirin dose: 81mg  LVAD Protocol followed: yes  Delisa Hillman RN

## 2019-12-05 ENCOUNTER — CARE COORDINATION (OUTPATIENT)
Dept: CARDIOLOGY | Facility: CLINIC | Age: 64
End: 2019-12-05

## 2019-12-05 NOTE — PROGRESS NOTES
Pt called today to check in. He stated he is having some dental work done - extractions and deep cleaning - and dentist requested an ok from cardiology team. Pt is faxing over the paperwork to VAD office for review and signature from Dr. Montgomery.   Pt also reported his weight is down to 246lb (from 267lb at last visit), which brings him to a BMI of 37 (from 40). Will discuss with Dr. Montgomery timing of doing any outstanding testing for transplant listing, as weight had been his biggest barrier to transplant in the past.

## 2019-12-06 ENCOUNTER — ANTICOAGULATION THERAPY VISIT (OUTPATIENT)
Dept: ANTICOAGULATION | Facility: CLINIC | Age: 64
End: 2019-12-06

## 2019-12-06 ENCOUNTER — TELEPHONE (OUTPATIENT)
Dept: CARDIOLOGY | Facility: CLINIC | Age: 64
End: 2019-12-06

## 2019-12-06 DIAGNOSIS — Z95.811 LVAD (LEFT VENTRICULAR ASSIST DEVICE) PRESENT (H): ICD-10-CM

## 2019-12-06 DIAGNOSIS — Z79.01 LONG TERM CURRENT USE OF ANTICOAGULANT THERAPY: ICD-10-CM

## 2019-12-06 LAB — INR PPP: 3.2 (ref 0.86–1.14)

## 2019-12-06 PROCEDURE — 36415 COLL VENOUS BLD VENIPUNCTURE: CPT | Performed by: INTERNAL MEDICINE

## 2019-12-06 PROCEDURE — 85610 PROTHROMBIN TIME: CPT | Performed by: INTERNAL MEDICINE

## 2019-12-06 NOTE — TELEPHONE ENCOUNTER
[facility] Call Center    Phone Message    May a detailed message be left on voicemail: yes    Reason for Call: Other: Dr. Minoo Palacios's team calling from [facility] to request that the medical authroization form that Dr. Montgomery sent for Jim is refaxed. The original fax was [employer], so a new one is requested. The best number to refax the authorization to is [phone]. Please call Dr. Narinder Palacios back with any questions regarding this request.     Action Taken: Message routed to:  [facility] ([facility]  Cardio

## 2019-12-06 NOTE — PROGRESS NOTES
ANTICOAGULATION FOLLOW-UP CLINIC VISIT    Patient Name:  Jim Willingham  Date:  12/6/2019  Contact Type:  Telephone    SUBJECTIVE:  Patient Findings     Positives:   Change in medications (started on clindamycin (from dentist) he is taking twice a day and has 13 tablets left)    Comments:   Jim reports no changes in health, diet.  He is on clindamycin (through dentist).  He needs to have 3 teeth extracted and a deep cleaning--he is having teeth cleaned next week and has not scheduled teeth extraction yet.        Clinical Outcomes     Comments:   Jim reports no changes in health, diet.  He is on clindamycin (through dentist).  He needs to have 3 teeth extracted and a deep cleaning--he is having teeth cleaned next week and has not scheduled teeth extraction yet.           OBJECTIVE    INR   Date Value Ref Range Status   12/06/2019 3.20 (H) 0.86 - 1.14 Final       ASSESSMENT / PLAN  INR assessment SUPRA    Recheck INR In: 4 DAYS    INR Location Clinic      Anticoagulation Summary  As of 12/6/2019    INR goal:   2.0-3.0   TTR:   77.3 % (1 y)   INR used for dosing:   3.20! (12/6/2019)   Warfarin maintenance plan:   10 mg (5 mg x 2) every Mon; 7.5 mg (5 mg x 1.5) all other days   Full warfarin instructions:   12/6: 5 mg; 12/9: 7.5 mg; Otherwise 10 mg every Mon; 7.5 mg all other days   Weekly warfarin total:   55 mg   Plan last modified:   Delisa Hillman RN (11/29/2019)   Next INR check:   12/10/2019   Priority:   Critical   Target end date:       Indications    LVAD (left ventricular assist device) present (H) [Z95.811]  Long-term (current) use of anticoagulants [Z79.01] [Z79.01]             Anticoagulation Episode Summary     INR check location:       Preferred lab:       Send INR reminders to:   [facility]    Comments:   HIPPA Forms mailed 6/26/17  Labs drawn either at [facility] or [facility]  LVAD placed 6/19/17   II  ASA 81 mg daily      Anticoagulation Care Providers     Provider Role  Specialty Phone number    Ulises Delisa Sprague MD Responsible Cardiology [phone]            See the Encounter Report to view Anticoagulation Flowsheet and Dosing Calendar (Go to Encounters tab in chart review, and find the Anticoagulation Therapy Visit)    Spoke with Jim.  He needs to have 3 teeth extracted and a deep cleaning.  He is scheduled for a deep cleaning next week, and has not scheduled the extractions yet.  He will schedule extractions when his INR is in range.  Jim is going to check with his dental/oral surgeon to see where they need INR to do extractions.      Patient had LVAD placed on:   6/19/17  Type of LVAD: HM 2  Patient's current Aspirin dose: 81 mg daily  LVAD Protocol followed: Yes     Kirsty Bermudez RN

## 2019-12-09 ENCOUNTER — ANTICOAGULATION THERAPY VISIT (OUTPATIENT)
Dept: ANTICOAGULATION | Facility: CLINIC | Age: 64
End: 2019-12-09

## 2019-12-09 DIAGNOSIS — Z79.01 LONG TERM CURRENT USE OF ANTICOAGULANT THERAPY: ICD-10-CM

## 2019-12-09 DIAGNOSIS — Z95.811 LVAD (LEFT VENTRICULAR ASSIST DEVICE) PRESENT (H): ICD-10-CM

## 2019-12-09 LAB — INR PPP: 3.13 (ref 0.86–1.14)

## 2019-12-09 PROCEDURE — 36415 COLL VENOUS BLD VENIPUNCTURE: CPT | Performed by: INTERNAL MEDICINE

## 2019-12-09 PROCEDURE — 85610 PROTHROMBIN TIME: CPT | Performed by: INTERNAL MEDICINE

## 2019-12-09 NOTE — PROGRESS NOTES
ANTICOAGULATION FOLLOW-UP CLINIC VISIT    Patient Name:  Jim Willingham  Date:  12/9/2019  Contact Type:  Telephone    SUBJECTIVE:  Patient Findings     Positives:   Upcoming invasive procedure (12/20-Dental consult-they will set a date for 3 teeth to be extracted.  Pt will find out what # they want INR to be.)             OBJECTIVE    INR   Date Value Ref Range Status   12/09/2019 3.13 (H) 0.86 - 1.14 Final       ASSESSMENT / PLAN  INR assessment SUPRA    Recheck INR In: 4 DAYS    INR Location Clinic      Anticoagulation Summary  As of 12/9/2019    INR goal:   2.0-3.0   TTR:   76.5 % (1 y)   INR used for dosing:   3.13! (12/9/2019)   Warfarin maintenance plan:   10 mg (5 mg x 2) every Mon; 7.5 mg (5 mg x 1.5) all other days   Full warfarin instructions:   12/9: 7.5 mg; 12/11: 5 mg; Otherwise 10 mg every Mon; 7.5 mg all other days   Weekly warfarin total:   55 mg   Plan last modified:   Delisa Hillman RN (11/29/2019)   Next INR check:   12/13/2019   Priority:   Critical   Target end date:       Indications    LVAD (left ventricular assist device) present (H) [Z95.811]  Long-term (current) use of anticoagulants [Z79.01] [Z79.01]             Anticoagulation Episode Summary     INR check location:       Preferred lab:       Send INR reminders to:   [facility]    Comments:   HIPPA Forms mailed 6/26/17  Labs drawn either at [facility] or [facility]  LVAD placed 6/19/17   II  ASA 81 mg daily      Anticoagulation Care Providers     Provider Role Specialty Phone number    Delisa Montgomery MD Responsible Cardiology [phone]            See the Encounter Report to view Anticoagulation Flowsheet and Dosing Calendar (Go to Encounters tab in chart review, and find the Anticoagulation Therapy Visit)  Spoke with patient.  Patient had LVAD placed on:   6/19/17  Type of LVAD: HM2  Patient's current Aspirin dose: 81mg  LVAD Protocol followed: yes  Delisa Hillman RN

## 2019-12-11 DIAGNOSIS — I50.22 CHRONIC SYSTOLIC CONGESTIVE HEART FAILURE (H): ICD-10-CM

## 2019-12-11 DIAGNOSIS — Z95.811 LVAD (LEFT VENTRICULAR ASSIST DEVICE) PRESENT (H): ICD-10-CM

## 2019-12-12 RX ORDER — METOPROLOL SUCCINATE 25 MG/1
TABLET, EXTENDED RELEASE ORAL
Qty: 270 TABLET | Refills: 2 | Status: SHIPPED | OUTPATIENT
Start: 2019-12-12 | End: 2020-02-13

## 2019-12-13 ENCOUNTER — ANTICOAGULATION THERAPY VISIT (OUTPATIENT)
Dept: ANTICOAGULATION | Facility: CLINIC | Age: 64
End: 2019-12-13

## 2019-12-13 DIAGNOSIS — Z79.01 LONG TERM CURRENT USE OF ANTICOAGULANT THERAPY: ICD-10-CM

## 2019-12-13 DIAGNOSIS — Z95.811 LVAD (LEFT VENTRICULAR ASSIST DEVICE) PRESENT (H): ICD-10-CM

## 2019-12-13 LAB — INR PPP: 1.65 (ref 0.86–1.14)

## 2019-12-13 PROCEDURE — 85610 PROTHROMBIN TIME: CPT | Performed by: INTERNAL MEDICINE

## 2019-12-13 PROCEDURE — 36415 COLL VENOUS BLD VENIPUNCTURE: CPT | Performed by: INTERNAL MEDICINE

## 2019-12-13 NOTE — PROGRESS NOTES
ANTICOAGULATION FOLLOW-UP CLINIC VISIT      19 ADDENDUM  Jim govea.  Recorded missed dose on Tuesday.  Patient states he has had recent dental procedures.  Has had blood in his mouth.  Reviewed recommendations.  No changes made.  REGINALD Emerson        Patient Name:  Jim Willingham  Date:  2019  Contact Type:  Telephone    SUBJECTIVE:         OBJECTIVE    INR   Date Value Ref Range Status   2019 1.65 (H) 0.86 - 1.14 Final       ASSESSMENT / PLAN  INR assessment SUB    Recheck INR In: 3 DAYS    INR Location Clinic      Anticoagulation Summary  As of 2019    INR goal:   2.0-3.0   TTR:   76.1 % (1 y)   INR used for dosin.65! (2019)   Warfarin maintenance plan:   10 mg (5 mg x 2) every Mon; 7.5 mg (5 mg x 1.5) all other days   Full warfarin instructions:   : 10 mg; Otherwise 10 mg every Mon; 7.5 mg all other days   Weekly warfarin total:   55 mg   Plan last modified:   Delisa Hillman RN (2019)   Next INR check:   2019   Priority:   Critical   Target end date:       Indications    LVAD (left ventricular assist device) present (H) [Z95.811]  Long-term (current) use of anticoagulants [Z79.01] [Z79.01]             Anticoagulation Episode Summary     INR check location:       Preferred lab:       Send INR reminders to:   [facility]    Comments:   HIPPA Forms mailed 17  Labs drawn either at [facility] or [facility]  LVAD placed 17   II  ASA 81 mg daily      Anticoagulation Care Providers     Provider Role Specialty Phone number    Delisa Montgomery MD Responsible Cardiology [phone]            See the Encounter Report to view Anticoagulation Flowsheet and Dosing Calendar (Go to Encounters tab in chart review, and find the Anticoagulation Therapy Visit)    Left message for patient with results and dosing recommendations. Asked patient to call back to report any missed doses, falls, signs and symptoms of bleeding or clotting, any  changes in health, medication, or diet. Asked patient to call back with any questions or concerns.    Patient had LVAD placed on:   6/19/17  Type of LVAD: HM 2  Patient's current Aspirin dose: 81 mg daily  LVAD Protocol followed: Yes, he is 0.35 below his INR goal range.  Warfarin dose was increased and will recommend recheck INR in 72 hours.       Kirsty Bermudez RN

## 2019-12-16 ENCOUNTER — ANTICOAGULATION THERAPY VISIT (OUTPATIENT)
Dept: ANTICOAGULATION | Facility: CLINIC | Age: 64
End: 2019-12-16

## 2019-12-16 DIAGNOSIS — Z95.811 LVAD (LEFT VENTRICULAR ASSIST DEVICE) PRESENT (H): ICD-10-CM

## 2019-12-16 DIAGNOSIS — Z79.01 LONG TERM CURRENT USE OF ANTICOAGULANT THERAPY: ICD-10-CM

## 2019-12-16 LAB — INR PPP: 2.11 (ref 0.86–1.14)

## 2019-12-16 PROCEDURE — 85610 PROTHROMBIN TIME: CPT | Performed by: INTERNAL MEDICINE

## 2019-12-16 PROCEDURE — 36415 COLL VENOUS BLD VENIPUNCTURE: CPT | Performed by: INTERNAL MEDICINE

## 2019-12-16 NOTE — PROGRESS NOTES
ANTICOAGULATION FOLLOW-UP CLINIC VISIT    Patient Name:  Jim Willingham  Date:  2019  Contact Type:  Telephone    SUBJECTIVE:  Patient Findings     Comments:   Jim reports no changes in health, diet, medications.          Clinical Outcomes     Comments:   Jim reports no changes in health, diet, medications.             OBJECTIVE    INR   Date Value Ref Range Status   2019 2.11 (H) 0.86 - 1.14 Final       ASSESSMENT / PLAN  INR assessment THER    Recheck INR In: 4 DAYS    INR Location Clinic      Anticoagulation Summary  As of 2019    INR goal:   2.0-3.0   TTR:   75.5 % (1 y)   INR used for dosin.11 (2019)   Warfarin maintenance plan:   10 mg (5 mg x 2) every Mon; 7.5 mg (5 mg x 1.5) all other days   Full warfarin instructions:   : 7.5 mg; Otherwise 10 mg every Mon; 7.5 mg all other days   Weekly warfarin total:   55 mg   Plan last modified:   Delisa Hillman RN (2019)   Next INR check:   2019   Priority:   Critical   Target end date:       Indications    LVAD (left ventricular assist device) present (H) [Z95.811]  Long-term (current) use of anticoagulants [Z79.01] [Z79.01]             Anticoagulation Episode Summary     INR check location:       Preferred lab:       Send INR reminders to:   [facility]    Comments:   HIPPA Forms mailed 17  Labs drawn either at [facility] or [facility]  LVAD placed 17   II  ASA 81 mg daily      Anticoagulation Care Providers     Provider Role Specialty Phone number    Delisa Montgomery MD [facility] Cardiology [phone]            See the Encounter Report to view Anticoagulation Flowsheet and Dosing Calendar (Go to Encounters tab in chart review, and find the Anticoagulation Therapy Visit)    Spoke with Jim.  His INR has not been stable lately.  Recommend he take 7.5 mg of warfarin daily and recheck INR 19.      Patient had LVAD placed on:   17  Type of LVAD: HM 2  Patient's  current Aspirin dose: 81 mg daily  LVAD Protocol followed: Yes     Kirsty Bermudez RN

## 2019-12-20 ENCOUNTER — ANTICOAGULATION THERAPY VISIT (OUTPATIENT)
Dept: ANTICOAGULATION | Facility: CLINIC | Age: 64
End: 2019-12-20

## 2019-12-20 DIAGNOSIS — Z79.01 LONG TERM CURRENT USE OF ANTICOAGULANT THERAPY: ICD-10-CM

## 2019-12-20 DIAGNOSIS — Z95.811 LVAD (LEFT VENTRICULAR ASSIST DEVICE) PRESENT (H): ICD-10-CM

## 2019-12-20 LAB — INR PPP: 2.36 (ref 0.86–1.14)

## 2019-12-20 PROCEDURE — 36415 COLL VENOUS BLD VENIPUNCTURE: CPT | Performed by: INTERNAL MEDICINE

## 2019-12-20 PROCEDURE — 85610 PROTHROMBIN TIME: CPT | Performed by: INTERNAL MEDICINE

## 2019-12-20 NOTE — PROGRESS NOTES
ANTICOAGULATION FOLLOW-UP CLINIC VISIT    Patient Name:  Jim Willingham  Date:  2019  Contact Type:  Telephone    SUBJECTIVE:  Patient Findings     Positives:   Upcoming dental procedure    Comments:   Pt sees an oral surgeon today as he states 3 teeth must be removed.  He is advised to contact us when he hears more information.  He states the surgeon is aware he takes coumadin.        Clinical Outcomes     Comments:   Pt sees an oral surgeon today as he states 3 teeth must be removed.  He is advised to contact us when he hears more information.  He states the surgeon is aware he takes coumadin.           OBJECTIVE    INR   Date Value Ref Range Status   2019 2.36 (H) 0.86 - 1.14 Final       ASSESSMENT / PLAN  INR assessment THER    Recheck INR In: 1 WEEK    INR Location Clinic      Anticoagulation Summary  As of 2019    INR goal:   2.0-3.0   TTR:   75.5 % (1 y)   INR used for dosin.36 (2019)   Warfarin maintenance plan:   10 mg (5 mg x 2) every Mon; 7.5 mg (5 mg x 1.5) all other days   Full warfarin instructions:   10 mg every Mon; 7.5 mg all other days   Weekly warfarin total:   55 mg   No change documented:   Suyapa Crawford RN   Plan last modified:   Delisa Hillman RN (2019)   Next INR check:   2019   Priority:   Critical   Target end date:       Indications    LVAD (left ventricular assist device) present (H) [Z95.811]  Long-term (current) use of anticoagulants [Z79.01] [Z79.01]             Anticoagulation Episode Summary     INR check location:       Preferred lab:       Send INR reminders to:   [facility]    Comments:   HIPPA Forms mailed 17  Labs drawn either at [facility] or [facility]  LVAD placed 17   II  ASA 81 mg daily      Anticoagulation Care Providers     Provider Role Specialty Phone number    Delisa Montgomery MD [facility] Cardiology [phone]            See the Encounter Report to view Anticoagulation Flowsheet  and Dosing Calendar (Go to Encounters tab in chart review, and find the Anticoagulation Therapy Visit)    Spoke with patient. Gave them their lab results and new warfarin recommendation.  No changes in health, medication, or diet. No missed doses, no falls. No signs or symptoms of bleed or clotting.  Suyapa Crawford RN  12/20/2019-  PLAN for 1/21/2020 Dental surgery to have teeth pulled:  Pt will take coumadin 3.75mg on 1/19 and 1/20 in preparation .  INR is to be 2.5 or less.  This plan was OKed by Rajani LVAD nurse.  Called and left a message for patient with this plan.  VESNA

## 2019-12-27 ENCOUNTER — ANTICOAGULATION THERAPY VISIT (OUTPATIENT)
Dept: ANTICOAGULATION | Facility: CLINIC | Age: 64
End: 2019-12-27

## 2019-12-27 DIAGNOSIS — Z79.01 LONG TERM CURRENT USE OF ANTICOAGULANT THERAPY: ICD-10-CM

## 2019-12-27 DIAGNOSIS — Z95.811 LVAD (LEFT VENTRICULAR ASSIST DEVICE) PRESENT (H): ICD-10-CM

## 2019-12-27 LAB — INR PPP: 3.4 (ref 0.86–1.14)

## 2019-12-27 PROCEDURE — 85610 PROTHROMBIN TIME: CPT | Performed by: INTERNAL MEDICINE

## 2019-12-27 PROCEDURE — 36415 COLL VENOUS BLD VENIPUNCTURE: CPT | Performed by: INTERNAL MEDICINE

## 2019-12-27 NOTE — PROGRESS NOTES
ANTICOAGULATION FOLLOW-UP CLINIC VISIT    Patient Name:  Jim Willingham  Date:  12/27/2019  Contact Type:  Telephone    SUBJECTIVE:  Patient Findings     Comments:   12/20/2019-  PLAN for 1/21/2020 Dental surgery to have teeth pulled:  Pt will take coumadin 3.75mg on 1/19 and 1/20 in preparation .  INR is to be 2.5 or less.  This plan was OKed by Rajani LVAD nurse.  Called and left a message for patient with this plan.  VESNA        Clinical Outcomes     Comments:   12/20/2019-  PLAN for 1/21/2020 Dental surgery to have teeth pulled:  Pt will take coumadin 3.75mg on 1/19 and 1/20 in preparation .  INR is to be 2.5 or less.  This plan was OKed by Rajani LVAD nurse.  Called and left a message for patient with this plan.  VESNA           OBJECTIVE    INR   Date Value Ref Range Status   12/27/2019 3.40 (H) 0.86 - 1.14 Final       ASSESSMENT / PLAN  INR assessment SUPRA    Recheck INR In: 2 WEEKS    INR Location Clinic      Anticoagulation Summary  As of 12/27/2019    INR goal:   2.0-3.0   TTR:   74.8 % (1 y)   INR used for dosing:   3.40! (12/27/2019)   Warfarin maintenance plan:   10 mg (5 mg x 2) every Mon; 7.5 mg (5 mg x 1.5) all other days   Full warfarin instructions:   12/27: 5 mg; Otherwise 10 mg every Mon; 7.5 mg all other days   Weekly warfarin total:   55 mg   Plan last modified:   Delisa Hillman RN (11/29/2019)   Next INR check:   1/3/2020   Priority:   Critical   Target end date:       Indications    LVAD (left ventricular assist device) present (H) [Z95.811]  Long-term (current) use of anticoagulants [Z79.01] [Z79.01]             Anticoagulation Episode Summary     INR check location:       Preferred lab:       [facility]    Comments:   HIPPA Forms mailed 6/26/17  Labs drawn either at [facility] or [facility]  LVAD placed 6/19/17   II  ASA 81 mg daily      Anticoagulation Care Providers     Provider Role Specialty Phone number    Delisa Montgomery MD  [facility] Cardiology [phone]            See the Encounter Report to view Anticoagulation Flowsheet and Dosing Calendar (Go to Encounters tab in chart review, and find the Anticoagulation Therapy Visit)    Left message for patient with results and dosing recommendations. Asked patient to call back to report any missed doses, falls, signs and symptoms of bleeding or clotting, any changes in health, medication, or diet. Asked patient to call back with any questions or concerns.      Maru Alonso RN

## 2020-01-03 ENCOUNTER — ANTICOAGULATION THERAPY VISIT (OUTPATIENT)
Dept: ANTICOAGULATION | Facility: CLINIC | Age: 65
End: 2020-01-03

## 2020-01-03 DIAGNOSIS — Z95.811 LVAD (LEFT VENTRICULAR ASSIST DEVICE) PRESENT (H): ICD-10-CM

## 2020-01-03 DIAGNOSIS — Z79.01 LONG TERM CURRENT USE OF ANTICOAGULANT THERAPY: ICD-10-CM

## 2020-01-03 LAB — INR PPP: 2.55 (ref 0.86–1.14)

## 2020-01-03 PROCEDURE — 36415 COLL VENOUS BLD VENIPUNCTURE: CPT | Performed by: INTERNAL MEDICINE

## 2020-01-03 PROCEDURE — 85610 PROTHROMBIN TIME: CPT | Performed by: INTERNAL MEDICINE

## 2020-01-03 NOTE — PROGRESS NOTES
ANTICOAGULATION FOLLOW-UP CLINIC VISIT    Patient Name:  Jim Willingham  Date:  1/3/2020  Contact Type:  Telephone    SUBJECTIVE:  Patient Findings     Comments:   Spoke with Jim.  He reports he did not take a 10 mg dose of warfarin, he took 7.5 daily after 19---calendar updated.        Clinical Outcomes     Comments:   Spoke with Jim.  He reports he did not take a 10 mg dose of warfarin, he took 7.5 daily after 19---calendar updated.           OBJECTIVE    INR   Date Value Ref Range Status   2020 2.55 (H) 0.86 - 1.14 Final       ASSESSMENT / PLAN  INR assessment THER    Recheck INR In: 1 WEEK    INR Location Clinic      Anticoagulation Summary  As of 1/3/2020    INR goal:   2.0-3.0   TTR:   73.9 % (1 y)   INR used for dosin.55 (1/3/2020)   Warfarin maintenance plan:   7.5 mg (5 mg x 1.5) every day   Full warfarin instructions:   : 7.5 mg; Otherwise 7.5 mg every day   Weekly warfarin total:   52.5 mg   Plan last modified:   Kirsty Bermudez RN (1/3/2020)   Next INR check:   1/10/2020   Priority:   Critical   Target end date:       Indications    LVAD (left ventricular assist device) present (H) [Z95.811]  Long-term (current) use of anticoagulants [Z79.01] [Z79.01]             Anticoagulation Episode Summary     INR check location:       Preferred lab:       Send INR reminders to:   [facility]    Comments:   HIPPA Forms mailed 17  Labs drawn either at [facility] or [facility]  LVAD placed 17   II  ASA 81 mg daily      Anticoagulation Care Providers     Provider Role Specialty Phone number    Delisa Montgomery MD Responsible Cardiology [phone]            See the Encounter Report to view Anticoagulation Flowsheet and Dosing Calendar (Go to Encounters tab in chart review, and find the Anticoagulation Therapy Visit)    Spoke with Jim. Jim's maintenance dose of warfarin had been 55 mg/week. He had 50 mg of warfarin last week and INR is  therapeutic, adjusted maintenance dose to 52.5 mg/week.  He will recheck INR in one week.    Patient had LVAD placed on:   6/19/17  Type of LVAD: HM 2  Patient's current Aspirin dose: 81 mg daily  LVAD Protocol followed: Yes      Kirsty Bermudez RN

## 2020-01-06 ENCOUNTER — ANCILLARY PROCEDURE (OUTPATIENT)
Dept: CARDIOLOGY | Facility: CLINIC | Age: 65
End: 2020-01-06
Attending: INTERNAL MEDICINE
Payer: COMMERCIAL

## 2020-01-06 DIAGNOSIS — I42.9 CARDIOMYOPATHY (H): ICD-10-CM

## 2020-01-06 PROCEDURE — 93296 REM INTERROG EVL PM/IDS: CPT | Mod: ZF

## 2020-01-06 PROCEDURE — 93295 DEV INTERROG REMOTE 1/2/MLT: CPT | Mod: ZP | Performed by: INTERNAL MEDICINE

## 2020-01-08 ENCOUNTER — ANCILLARY PROCEDURE (OUTPATIENT)
Dept: CARDIOLOGY | Facility: CLINIC | Age: 65
End: 2020-01-08
Attending: INTERNAL MEDICINE
Payer: COMMERCIAL

## 2020-01-08 DIAGNOSIS — I42.9 CARDIOMYOPATHY (H): ICD-10-CM

## 2020-01-08 PROCEDURE — 93296 REM INTERROG EVL PM/IDS: CPT | Mod: ZF

## 2020-01-08 PROCEDURE — 93295 DEV INTERROG REMOTE 1/2/MLT: CPT | Mod: ZP | Performed by: INTERNAL MEDICINE

## 2020-01-09 ENCOUNTER — CARE COORDINATION (OUTPATIENT)
Dept: CARDIOLOGY | Facility: CLINIC | Age: 65
End: 2020-01-09

## 2020-01-09 NOTE — PROGRESS NOTES
Received message from Device RN yesterday that pt had an episode of weakness and collapsed the prior day. Called pt yesterday to check in, left voicemail requesting call back.  Pt called back today and left voicemail that he feels fine and has no residual weakness or dizziness. He questioned whether the episode was related to not eating yet that day.   No further follow-up at this time.

## 2020-01-10 ENCOUNTER — ANTICOAGULATION THERAPY VISIT (OUTPATIENT)
Dept: ANTICOAGULATION | Facility: CLINIC | Age: 65
End: 2020-01-10

## 2020-01-10 DIAGNOSIS — Z95.811 LVAD (LEFT VENTRICULAR ASSIST DEVICE) PRESENT (H): ICD-10-CM

## 2020-01-10 DIAGNOSIS — Z79.01 LONG TERM CURRENT USE OF ANTICOAGULANT THERAPY: ICD-10-CM

## 2020-01-10 LAB — INR PPP: 3.13 (ref 0.86–1.14)

## 2020-01-10 PROCEDURE — 85610 PROTHROMBIN TIME: CPT | Performed by: INTERNAL MEDICINE

## 2020-01-10 PROCEDURE — 36415 COLL VENOUS BLD VENIPUNCTURE: CPT | Performed by: INTERNAL MEDICINE

## 2020-01-10 NOTE — PROGRESS NOTES
ANTICOAGULATION FOLLOW-UP CLINIC VISIT    Patient Name:  Jim Willingham  Date:  1/10/2020  Contact Type:  Telephone    SUBJECTIVE:  Patient Findings     Comments:   Encouraged an extra serving of vitamin K.         Clinical Outcomes     Comments:   Encouraged an extra serving of vitamin K.            OBJECTIVE    INR   Date Value Ref Range Status   01/10/2020 3.13 (H) 0.86 - 1.14 Final       ASSESSMENT / PLAN  No question data found.  Anticoagulation Summary  As of 1/10/2020    INR goal:   2.0-3.0   TTR:   73.6 % (1 y)   INR used for dosing:   3.13! (1/10/2020)   Warfarin maintenance plan:   7.5 mg (5 mg x 1.5) every day   Full warfarin instructions:   7.5 mg every day   Weekly warfarin total:   52.5 mg   Plan last modified:   Kirsty Bermudez RN (1/3/2020)   Next INR check:   1/17/2020   Priority:   Critical   Target end date:       Indications    LVAD (left ventricular assist device) present (H) [Z95.811]  Long-term (current) use of anticoagulants [Z79.01] [Z79.01]             Anticoagulation Episode Summary     INR check location:       Preferred lab:       Send INR reminders to:   [facility]    Comments:   HIPPA Forms mailed 6/26/17  Labs drawn either at [facility] or [facility]  LVAD placed 6/19/17   II  ASA 81 mg daily      Anticoagulation Care Providers     Provider Role Specialty Phone number    Delisa Montgomery MD Responsible Cardiology [phone]            See the Encounter Report to view Anticoagulation Flowsheet and Dosing Calendar (Go to Encounters tab in chart review, and find the Anticoagulation Therapy Visit)    Spoke with patient.     Maru Alonso RN

## 2020-01-13 LAB
MDC_IDC_EPISODE_DTM: NORMAL
MDC_IDC_EPISODE_DURATION: 0 S
MDC_IDC_EPISODE_DURATION: 1 S
MDC_IDC_EPISODE_DURATION: 10 S
MDC_IDC_EPISODE_DURATION: 10 S
MDC_IDC_EPISODE_DURATION: 12 S
MDC_IDC_EPISODE_DURATION: 133 S
MDC_IDC_EPISODE_DURATION: 14 S
MDC_IDC_EPISODE_DURATION: 14 S
MDC_IDC_EPISODE_DURATION: 15 S
MDC_IDC_EPISODE_DURATION: 17 S
MDC_IDC_EPISODE_DURATION: 18 S
MDC_IDC_EPISODE_DURATION: 19 S
MDC_IDC_EPISODE_DURATION: 20 S
MDC_IDC_EPISODE_DURATION: 203 S
MDC_IDC_EPISODE_DURATION: 223 S
MDC_IDC_EPISODE_DURATION: 35 S
MDC_IDC_EPISODE_DURATION: 37 S
MDC_IDC_EPISODE_DURATION: 47 S
MDC_IDC_EPISODE_DURATION: 7602 S
MDC_IDC_EPISODE_ID: 275
MDC_IDC_EPISODE_ID: 276
MDC_IDC_EPISODE_ID: 277
MDC_IDC_EPISODE_ID: 278
MDC_IDC_EPISODE_ID: 279
MDC_IDC_EPISODE_ID: 280
MDC_IDC_EPISODE_ID: 281
MDC_IDC_EPISODE_ID: 282
MDC_IDC_EPISODE_ID: 283
MDC_IDC_EPISODE_ID: 284
MDC_IDC_EPISODE_ID: 285
MDC_IDC_EPISODE_ID: 286
MDC_IDC_EPISODE_ID: 8859
MDC_IDC_EPISODE_ID: 9158
MDC_IDC_EPISODE_ID: 9159
MDC_IDC_EPISODE_ID: 9160
MDC_IDC_EPISODE_ID: 9161
MDC_IDC_EPISODE_ID: 9162
MDC_IDC_EPISODE_ID: 9163
MDC_IDC_EPISODE_ID: 9164
MDC_IDC_EPISODE_ID: 9165
MDC_IDC_EPISODE_ID: 9166
MDC_IDC_EPISODE_ID: 9167
MDC_IDC_EPISODE_ID: 9168
MDC_IDC_EPISODE_ID: 9169
MDC_IDC_EPISODE_ID: 9170
MDC_IDC_EPISODE_TYPE: NORMAL
MDC_IDC_LEAD_IMPLANT_DT: NORMAL
MDC_IDC_LEAD_LOCATION: NORMAL
MDC_IDC_LEAD_LOCATION_DETAIL_1: NORMAL
MDC_IDC_LEAD_MFG: NORMAL
MDC_IDC_LEAD_MODEL: NORMAL
MDC_IDC_LEAD_POLARITY_TYPE: NORMAL
MDC_IDC_LEAD_SERIAL: NORMAL
MDC_IDC_MSMT_BATTERY_DTM: NORMAL
MDC_IDC_MSMT_BATTERY_DTM: NORMAL
MDC_IDC_MSMT_BATTERY_REMAINING_LONGEVITY: 27 MO
MDC_IDC_MSMT_BATTERY_REMAINING_LONGEVITY: 27 MO
MDC_IDC_MSMT_BATTERY_RRT_TRIGGER: 2.73
MDC_IDC_MSMT_BATTERY_RRT_TRIGGER: 2.73
MDC_IDC_MSMT_BATTERY_STATUS: NORMAL
MDC_IDC_MSMT_BATTERY_STATUS: NORMAL
MDC_IDC_MSMT_BATTERY_VOLTAGE: 2.93 V
MDC_IDC_MSMT_BATTERY_VOLTAGE: 2.94 V
MDC_IDC_MSMT_CAP_CHARGE_DTM: NORMAL
MDC_IDC_MSMT_CAP_CHARGE_DTM: NORMAL
MDC_IDC_MSMT_CAP_CHARGE_ENERGY: 18 J
MDC_IDC_MSMT_CAP_CHARGE_ENERGY: 18 J
MDC_IDC_MSMT_CAP_CHARGE_TIME: 4.28
MDC_IDC_MSMT_CAP_CHARGE_TIME: 4.28
MDC_IDC_MSMT_CAP_CHARGE_TYPE: NORMAL
MDC_IDC_MSMT_CAP_CHARGE_TYPE: NORMAL
MDC_IDC_MSMT_LEADCHNL_LV_IMPEDANCE_VALUE: 4047 OHM
MDC_IDC_MSMT_LEADCHNL_LV_IMPEDANCE_VALUE: 437 OHM
MDC_IDC_MSMT_LEADCHNL_LV_IMPEDANCE_VALUE: 437 OHM
MDC_IDC_MSMT_LEADCHNL_LV_PACING_THRESHOLD_AMPLITUDE: 0.62 V
MDC_IDC_MSMT_LEADCHNL_LV_PACING_THRESHOLD_AMPLITUDE: 0.62 V
MDC_IDC_MSMT_LEADCHNL_LV_PACING_THRESHOLD_PULSEWIDTH: 0.4 MS
MDC_IDC_MSMT_LEADCHNL_LV_PACING_THRESHOLD_PULSEWIDTH: 0.4 MS
MDC_IDC_MSMT_LEADCHNL_RA_IMPEDANCE_VALUE: 437 OHM
MDC_IDC_MSMT_LEADCHNL_RA_IMPEDANCE_VALUE: 456 OHM
MDC_IDC_MSMT_LEADCHNL_RA_PACING_THRESHOLD_AMPLITUDE: 1.25 V
MDC_IDC_MSMT_LEADCHNL_RA_PACING_THRESHOLD_AMPLITUDE: 1.5 V
MDC_IDC_MSMT_LEADCHNL_RA_PACING_THRESHOLD_PULSEWIDTH: 0.4 MS
MDC_IDC_MSMT_LEADCHNL_RA_PACING_THRESHOLD_PULSEWIDTH: 0.4 MS
MDC_IDC_MSMT_LEADCHNL_RA_SENSING_INTR_AMPL: 1.25 MV
MDC_IDC_MSMT_LEADCHNL_RA_SENSING_INTR_AMPL: 1.25 MV
MDC_IDC_MSMT_LEADCHNL_RA_SENSING_INTR_AMPL: 1.38 MV
MDC_IDC_MSMT_LEADCHNL_RA_SENSING_INTR_AMPL: 1.38 MV
MDC_IDC_MSMT_LEADCHNL_RV_IMPEDANCE_VALUE: 247 OHM
MDC_IDC_MSMT_LEADCHNL_RV_IMPEDANCE_VALUE: 247 OHM
MDC_IDC_MSMT_LEADCHNL_RV_IMPEDANCE_VALUE: 342 OHM
MDC_IDC_MSMT_LEADCHNL_RV_IMPEDANCE_VALUE: 380 OHM
MDC_IDC_MSMT_LEADCHNL_RV_PACING_THRESHOLD_AMPLITUDE: 1 V
MDC_IDC_MSMT_LEADCHNL_RV_PACING_THRESHOLD_AMPLITUDE: 1.12 V
MDC_IDC_MSMT_LEADCHNL_RV_PACING_THRESHOLD_PULSEWIDTH: 0.4 MS
MDC_IDC_MSMT_LEADCHNL_RV_PACING_THRESHOLD_PULSEWIDTH: 0.4 MS
MDC_IDC_MSMT_LEADCHNL_RV_SENSING_INTR_AMPL: 2.75 MV
MDC_IDC_MSMT_LEADCHNL_RV_SENSING_INTR_AMPL: 2.75 MV
MDC_IDC_MSMT_LEADCHNL_RV_SENSING_INTR_AMPL: 6.38 MV
MDC_IDC_MSMT_LEADCHNL_RV_SENSING_INTR_AMPL: 6.38 MV
MDC_IDC_PG_IMPLANT_DTM: NORMAL
MDC_IDC_PG_IMPLANT_DTM: NORMAL
MDC_IDC_PG_MFG: NORMAL
MDC_IDC_PG_MFG: NORMAL
MDC_IDC_PG_MODEL: NORMAL
MDC_IDC_PG_MODEL: NORMAL
MDC_IDC_PG_SERIAL: NORMAL
MDC_IDC_PG_SERIAL: NORMAL
MDC_IDC_PG_TYPE: NORMAL
MDC_IDC_PG_TYPE: NORMAL
MDC_IDC_SESS_CLINIC_NAME: NORMAL
MDC_IDC_SESS_CLINIC_NAME: NORMAL
MDC_IDC_SESS_DTM: NORMAL
MDC_IDC_SESS_DTM: NORMAL
MDC_IDC_SESS_TYPE: NORMAL
MDC_IDC_SESS_TYPE: NORMAL
MDC_IDC_SET_BRADY_AT_MODE_SWITCH_RATE: 150 {BEATS}/MIN
MDC_IDC_SET_BRADY_AT_MODE_SWITCH_RATE: 150 {BEATS}/MIN
MDC_IDC_SET_BRADY_LOWRATE: 50 {BEATS}/MIN
MDC_IDC_SET_BRADY_LOWRATE: 50 {BEATS}/MIN
MDC_IDC_SET_BRADY_MAX_SENSOR_RATE: 130 {BEATS}/MIN
MDC_IDC_SET_BRADY_MAX_SENSOR_RATE: 130 {BEATS}/MIN
MDC_IDC_SET_BRADY_MAX_TRACKING_RATE: 130 {BEATS}/MIN
MDC_IDC_SET_BRADY_MAX_TRACKING_RATE: 130 {BEATS}/MIN
MDC_IDC_SET_BRADY_MODE: NORMAL
MDC_IDC_SET_BRADY_MODE: NORMAL
MDC_IDC_SET_BRADY_PAV_DELAY_LOW: 140 MS
MDC_IDC_SET_BRADY_PAV_DELAY_LOW: 150 MS
MDC_IDC_SET_BRADY_SAV_DELAY_LOW: 130 MS
MDC_IDC_SET_BRADY_SAV_DELAY_LOW: 130 MS
MDC_IDC_SET_CRT_PACED_CHAMBERS: NORMAL
MDC_IDC_SET_CRT_PACED_CHAMBERS: NORMAL
MDC_IDC_SET_LEADCHNL_LV_PACING_AMPLITUDE: 1.75 V
MDC_IDC_SET_LEADCHNL_LV_PACING_AMPLITUDE: 1.75 V
MDC_IDC_SET_LEADCHNL_LV_PACING_ANODE_ELECTRODE_1: NORMAL
MDC_IDC_SET_LEADCHNL_LV_PACING_ANODE_ELECTRODE_1: NORMAL
MDC_IDC_SET_LEADCHNL_LV_PACING_ANODE_LOCATION_1: NORMAL
MDC_IDC_SET_LEADCHNL_LV_PACING_ANODE_LOCATION_1: NORMAL
MDC_IDC_SET_LEADCHNL_LV_PACING_CAPTURE_MODE: NORMAL
MDC_IDC_SET_LEADCHNL_LV_PACING_CAPTURE_MODE: NORMAL
MDC_IDC_SET_LEADCHNL_LV_PACING_CATHODE_ELECTRODE_1: NORMAL
MDC_IDC_SET_LEADCHNL_LV_PACING_CATHODE_ELECTRODE_1: NORMAL
MDC_IDC_SET_LEADCHNL_LV_PACING_CATHODE_LOCATION_1: NORMAL
MDC_IDC_SET_LEADCHNL_LV_PACING_CATHODE_LOCATION_1: NORMAL
MDC_IDC_SET_LEADCHNL_LV_PACING_POLARITY: NORMAL
MDC_IDC_SET_LEADCHNL_LV_PACING_POLARITY: NORMAL
MDC_IDC_SET_LEADCHNL_LV_PACING_PULSEWIDTH: 0.4 MS
MDC_IDC_SET_LEADCHNL_LV_PACING_PULSEWIDTH: 0.4 MS
MDC_IDC_SET_LEADCHNL_RA_PACING_AMPLITUDE: 2.75 V
MDC_IDC_SET_LEADCHNL_RA_PACING_AMPLITUDE: 3 V
MDC_IDC_SET_LEADCHNL_RA_PACING_ANODE_ELECTRODE_1: NORMAL
MDC_IDC_SET_LEADCHNL_RA_PACING_ANODE_ELECTRODE_1: NORMAL
MDC_IDC_SET_LEADCHNL_RA_PACING_ANODE_LOCATION_1: NORMAL
MDC_IDC_SET_LEADCHNL_RA_PACING_ANODE_LOCATION_1: NORMAL
MDC_IDC_SET_LEADCHNL_RA_PACING_CAPTURE_MODE: NORMAL
MDC_IDC_SET_LEADCHNL_RA_PACING_CAPTURE_MODE: NORMAL
MDC_IDC_SET_LEADCHNL_RA_PACING_CATHODE_ELECTRODE_1: NORMAL
MDC_IDC_SET_LEADCHNL_RA_PACING_CATHODE_ELECTRODE_1: NORMAL
MDC_IDC_SET_LEADCHNL_RA_PACING_CATHODE_LOCATION_1: NORMAL
MDC_IDC_SET_LEADCHNL_RA_PACING_CATHODE_LOCATION_1: NORMAL
MDC_IDC_SET_LEADCHNL_RA_PACING_POLARITY: NORMAL
MDC_IDC_SET_LEADCHNL_RA_PACING_POLARITY: NORMAL
MDC_IDC_SET_LEADCHNL_RA_PACING_PULSEWIDTH: 0.4 MS
MDC_IDC_SET_LEADCHNL_RA_PACING_PULSEWIDTH: 0.4 MS
MDC_IDC_SET_LEADCHNL_RA_SENSING_ANODE_ELECTRODE_1: NORMAL
MDC_IDC_SET_LEADCHNL_RA_SENSING_ANODE_ELECTRODE_1: NORMAL
MDC_IDC_SET_LEADCHNL_RA_SENSING_ANODE_LOCATION_1: NORMAL
MDC_IDC_SET_LEADCHNL_RA_SENSING_ANODE_LOCATION_1: NORMAL
MDC_IDC_SET_LEADCHNL_RA_SENSING_CATHODE_ELECTRODE_1: NORMAL
MDC_IDC_SET_LEADCHNL_RA_SENSING_CATHODE_ELECTRODE_1: NORMAL
MDC_IDC_SET_LEADCHNL_RA_SENSING_CATHODE_LOCATION_1: NORMAL
MDC_IDC_SET_LEADCHNL_RA_SENSING_CATHODE_LOCATION_1: NORMAL
MDC_IDC_SET_LEADCHNL_RA_SENSING_POLARITY: NORMAL
MDC_IDC_SET_LEADCHNL_RA_SENSING_POLARITY: NORMAL
MDC_IDC_SET_LEADCHNL_RA_SENSING_SENSITIVITY: 0.45 MV
MDC_IDC_SET_LEADCHNL_RA_SENSING_SENSITIVITY: 0.45 MV
MDC_IDC_SET_LEADCHNL_RV_PACING_AMPLITUDE: 2 V
MDC_IDC_SET_LEADCHNL_RV_PACING_AMPLITUDE: 2.25 V
MDC_IDC_SET_LEADCHNL_RV_PACING_ANODE_ELECTRODE_1: NORMAL
MDC_IDC_SET_LEADCHNL_RV_PACING_ANODE_ELECTRODE_1: NORMAL
MDC_IDC_SET_LEADCHNL_RV_PACING_ANODE_LOCATION_1: NORMAL
MDC_IDC_SET_LEADCHNL_RV_PACING_ANODE_LOCATION_1: NORMAL
MDC_IDC_SET_LEADCHNL_RV_PACING_CAPTURE_MODE: NORMAL
MDC_IDC_SET_LEADCHNL_RV_PACING_CAPTURE_MODE: NORMAL
MDC_IDC_SET_LEADCHNL_RV_PACING_CATHODE_ELECTRODE_1: NORMAL
MDC_IDC_SET_LEADCHNL_RV_PACING_CATHODE_ELECTRODE_1: NORMAL
MDC_IDC_SET_LEADCHNL_RV_PACING_CATHODE_LOCATION_1: NORMAL
MDC_IDC_SET_LEADCHNL_RV_PACING_CATHODE_LOCATION_1: NORMAL
MDC_IDC_SET_LEADCHNL_RV_PACING_POLARITY: NORMAL
MDC_IDC_SET_LEADCHNL_RV_PACING_POLARITY: NORMAL
MDC_IDC_SET_LEADCHNL_RV_PACING_PULSEWIDTH: 0.4 MS
MDC_IDC_SET_LEADCHNL_RV_PACING_PULSEWIDTH: 0.4 MS
MDC_IDC_SET_LEADCHNL_RV_SENSING_ANODE_ELECTRODE_1: NORMAL
MDC_IDC_SET_LEADCHNL_RV_SENSING_ANODE_ELECTRODE_1: NORMAL
MDC_IDC_SET_LEADCHNL_RV_SENSING_ANODE_LOCATION_1: NORMAL
MDC_IDC_SET_LEADCHNL_RV_SENSING_ANODE_LOCATION_1: NORMAL
MDC_IDC_SET_LEADCHNL_RV_SENSING_CATHODE_ELECTRODE_1: NORMAL
MDC_IDC_SET_LEADCHNL_RV_SENSING_CATHODE_ELECTRODE_1: NORMAL
MDC_IDC_SET_LEADCHNL_RV_SENSING_CATHODE_LOCATION_1: NORMAL
MDC_IDC_SET_LEADCHNL_RV_SENSING_CATHODE_LOCATION_1: NORMAL
MDC_IDC_SET_LEADCHNL_RV_SENSING_POLARITY: NORMAL
MDC_IDC_SET_LEADCHNL_RV_SENSING_POLARITY: NORMAL
MDC_IDC_SET_LEADCHNL_RV_SENSING_SENSITIVITY: 0.3 MV
MDC_IDC_SET_LEADCHNL_RV_SENSING_SENSITIVITY: 0.3 MV
MDC_IDC_SET_ZONE_DETECTION_BEATS_DENOMINATOR: 40 {BEATS}
MDC_IDC_SET_ZONE_DETECTION_BEATS_DENOMINATOR: 40 {BEATS}
MDC_IDC_SET_ZONE_DETECTION_BEATS_NUMERATOR: 30 {BEATS}
MDC_IDC_SET_ZONE_DETECTION_BEATS_NUMERATOR: 30 {BEATS}
MDC_IDC_SET_ZONE_DETECTION_INTERVAL: 240 MS
MDC_IDC_SET_ZONE_DETECTION_INTERVAL: 240 MS
MDC_IDC_SET_ZONE_DETECTION_INTERVAL: 320 MS
MDC_IDC_SET_ZONE_DETECTION_INTERVAL: 320 MS
MDC_IDC_SET_ZONE_DETECTION_INTERVAL: 360 MS
MDC_IDC_SET_ZONE_DETECTION_INTERVAL: 360 MS
MDC_IDC_SET_ZONE_DETECTION_INTERVAL: 400 MS
MDC_IDC_SET_ZONE_TYPE: NORMAL
MDC_IDC_STAT_AT_BURDEN_PERCENT: 0 %
MDC_IDC_STAT_AT_BURDEN_PERCENT: 0.1 %
MDC_IDC_STAT_AT_DTM_END: NORMAL
MDC_IDC_STAT_AT_DTM_END: NORMAL
MDC_IDC_STAT_AT_DTM_START: NORMAL
MDC_IDC_STAT_AT_DTM_START: NORMAL
MDC_IDC_STAT_BRADY_AP_VP_PERCENT: 1.88 %
MDC_IDC_STAT_BRADY_AP_VP_PERCENT: 3.31 %
MDC_IDC_STAT_BRADY_AP_VS_PERCENT: 0.03 %
MDC_IDC_STAT_BRADY_AP_VS_PERCENT: 0.06 %
MDC_IDC_STAT_BRADY_AS_VP_PERCENT: 91.86 %
MDC_IDC_STAT_BRADY_AS_VP_PERCENT: 95.36 %
MDC_IDC_STAT_BRADY_AS_VS_PERCENT: 2.74 %
MDC_IDC_STAT_BRADY_AS_VS_PERCENT: 4.77 %
MDC_IDC_STAT_BRADY_DTM_END: NORMAL
MDC_IDC_STAT_BRADY_DTM_END: NORMAL
MDC_IDC_STAT_BRADY_DTM_START: NORMAL
MDC_IDC_STAT_BRADY_DTM_START: NORMAL
MDC_IDC_STAT_BRADY_RA_PERCENT_PACED: 1.88 %
MDC_IDC_STAT_BRADY_RA_PERCENT_PACED: 3.32 %
MDC_IDC_STAT_BRADY_RV_PERCENT_PACED: 0.66 %
MDC_IDC_STAT_BRADY_RV_PERCENT_PACED: 1.16 %
MDC_IDC_STAT_CRT_DTM_END: NORMAL
MDC_IDC_STAT_CRT_DTM_END: NORMAL
MDC_IDC_STAT_CRT_DTM_START: NORMAL
MDC_IDC_STAT_CRT_DTM_START: NORMAL
MDC_IDC_STAT_CRT_LV_PERCENT_PACED: 90.78 %
MDC_IDC_STAT_CRT_LV_PERCENT_PACED: 94.73 %
MDC_IDC_STAT_CRT_PERCENT_PACED: 90.78 %
MDC_IDC_STAT_CRT_PERCENT_PACED: 94.73 %
MDC_IDC_STAT_EPISODE_RECENT_COUNT: 0
MDC_IDC_STAT_EPISODE_RECENT_COUNT: 2
MDC_IDC_STAT_EPISODE_RECENT_COUNT: 2
MDC_IDC_STAT_EPISODE_RECENT_COUNT: 3
MDC_IDC_STAT_EPISODE_RECENT_COUNT: 65
MDC_IDC_STAT_EPISODE_RECENT_COUNT_DTM_END: NORMAL
MDC_IDC_STAT_EPISODE_RECENT_COUNT_DTM_START: NORMAL
MDC_IDC_STAT_EPISODE_TOTAL_COUNT: 0
MDC_IDC_STAT_EPISODE_TOTAL_COUNT: 12
MDC_IDC_STAT_EPISODE_TOTAL_COUNT: 12
MDC_IDC_STAT_EPISODE_TOTAL_COUNT: 14
MDC_IDC_STAT_EPISODE_TOTAL_COUNT: 14
MDC_IDC_STAT_EPISODE_TOTAL_COUNT: 168
MDC_IDC_STAT_EPISODE_TOTAL_COUNT: 170
MDC_IDC_STAT_EPISODE_TOTAL_COUNT: 4
MDC_IDC_STAT_EPISODE_TOTAL_COUNT: 4
MDC_IDC_STAT_EPISODE_TOTAL_COUNT_DTM_END: NORMAL
MDC_IDC_STAT_EPISODE_TOTAL_COUNT_DTM_START: NORMAL
MDC_IDC_STAT_EPISODE_TYPE: NORMAL
MDC_IDC_STAT_TACHYTHERAPY_ATP_DELIVERED_RECENT: 0
MDC_IDC_STAT_TACHYTHERAPY_ATP_DELIVERED_RECENT: 0
MDC_IDC_STAT_TACHYTHERAPY_ATP_DELIVERED_TOTAL: 13
MDC_IDC_STAT_TACHYTHERAPY_ATP_DELIVERED_TOTAL: 13
MDC_IDC_STAT_TACHYTHERAPY_RECENT_DTM_END: NORMAL
MDC_IDC_STAT_TACHYTHERAPY_RECENT_DTM_END: NORMAL
MDC_IDC_STAT_TACHYTHERAPY_RECENT_DTM_START: NORMAL
MDC_IDC_STAT_TACHYTHERAPY_RECENT_DTM_START: NORMAL
MDC_IDC_STAT_TACHYTHERAPY_SHOCKS_ABORTED_RECENT: 0
MDC_IDC_STAT_TACHYTHERAPY_SHOCKS_ABORTED_RECENT: 0
MDC_IDC_STAT_TACHYTHERAPY_SHOCKS_ABORTED_TOTAL: 1
MDC_IDC_STAT_TACHYTHERAPY_SHOCKS_ABORTED_TOTAL: 1
MDC_IDC_STAT_TACHYTHERAPY_SHOCKS_DELIVERED_RECENT: 0
MDC_IDC_STAT_TACHYTHERAPY_SHOCKS_DELIVERED_RECENT: 0
MDC_IDC_STAT_TACHYTHERAPY_SHOCKS_DELIVERED_TOTAL: 2
MDC_IDC_STAT_TACHYTHERAPY_SHOCKS_DELIVERED_TOTAL: 2
MDC_IDC_STAT_TACHYTHERAPY_TOTAL_DTM_END: NORMAL
MDC_IDC_STAT_TACHYTHERAPY_TOTAL_DTM_END: NORMAL
MDC_IDC_STAT_TACHYTHERAPY_TOTAL_DTM_START: NORMAL
MDC_IDC_STAT_TACHYTHERAPY_TOTAL_DTM_START: NORMAL

## 2020-01-15 ENCOUNTER — APPOINTMENT (OUTPATIENT)
Dept: GENERAL RADIOLOGY | Facility: CLINIC | Age: 65
DRG: 194 | End: 2020-01-15
Attending: EMERGENCY MEDICINE
Payer: COMMERCIAL

## 2020-01-15 ENCOUNTER — HOSPITAL ENCOUNTER (INPATIENT)
Facility: CLINIC | Age: 65
LOS: 1 days | Discharge: HOME | DRG: 194 | End: 2020-01-16
Attending: EMERGENCY MEDICINE | Admitting: INTERNAL MEDICINE
Payer: COMMERCIAL

## 2020-01-15 DIAGNOSIS — J11.1 INFLUENZA: Primary | ICD-10-CM

## 2020-01-15 DIAGNOSIS — J10.1 INFLUENZA DUE TO INFLUENZA VIRUS, TYPE A, HUMAN: ICD-10-CM

## 2020-01-15 DIAGNOSIS — J10.1 INFLUENZA B: ICD-10-CM

## 2020-01-15 DIAGNOSIS — R06.02 SHORTNESS OF BREATH: ICD-10-CM

## 2020-01-15 DIAGNOSIS — Z95.811 LVAD (LEFT VENTRICULAR ASSIST DEVICE) PRESENT (H): ICD-10-CM

## 2020-01-15 LAB
ALBUMIN SERPL-MCNC: 3.3 G/DL (ref 3.4–5)
ALP SERPL-CCNC: 84 U/L (ref 40–150)
ALT SERPL W P-5'-P-CCNC: 46 U/L (ref 0–70)
ANION GAP SERPL CALCULATED.3IONS-SCNC: 6 MMOL/L (ref 3–14)
AST SERPL W P-5'-P-CCNC: 38 U/L (ref 0–45)
BASOPHILS # BLD AUTO: 0.1 10E9/L (ref 0–0.2)
BASOPHILS NFR BLD AUTO: 0.9 %
BILIRUB SERPL-MCNC: 0.9 MG/DL (ref 0.2–1.3)
BUN SERPL-MCNC: 21 MG/DL (ref 7–30)
CALCIUM SERPL-MCNC: 7.7 MG/DL (ref 8.5–10.1)
CHLORIDE SERPL-SCNC: 99 MMOL/L (ref 94–109)
CO2 BLDCOV-SCNC: 30 MMOL/L (ref 21–28)
CO2 SERPL-SCNC: 28 MMOL/L (ref 20–32)
CREAT SERPL-MCNC: 1 MG/DL (ref 0.66–1.25)
DIFFERENTIAL METHOD BLD: ABNORMAL
EOSINOPHIL # BLD AUTO: 0 10E9/L (ref 0–0.7)
EOSINOPHIL NFR BLD AUTO: 0 %
ERYTHROCYTE [DISTWIDTH] IN BLOOD BY AUTOMATED COUNT: 13.7 % (ref 10–15)
FLUAV+FLUBV AG SPEC QL: NEGATIVE
FLUAV+FLUBV AG SPEC QL: POSITIVE
GFR SERPL CREATININE-BSD FRML MDRD: 80 ML/MIN/{1.73_M2}
GLUCOSE SERPL-MCNC: 138 MG/DL (ref 70–99)
HCT VFR BLD AUTO: 36.8 % (ref 40–53)
HGB BLD-MCNC: 11.6 G/DL (ref 13.3–17.7)
INR PPP: 2.1 (ref 0.86–1.14)
LACTATE BLD-SCNC: 1.2 MMOL/L (ref 0.7–2.1)
LACTATE BLD-SCNC: 1.4 MMOL/L (ref 0.7–2)
LDH SERPL L TO P-CCNC: 355 U/L (ref 85–227)
LYMPHOCYTES # BLD AUTO: 1.2 10E9/L (ref 0.8–5.3)
LYMPHOCYTES NFR BLD AUTO: 11.5 %
MCH RBC QN AUTO: 29.1 PG (ref 26.5–33)
MCHC RBC AUTO-ENTMCNC: 31.5 G/DL (ref 31.5–36.5)
MCV RBC AUTO: 93 FL (ref 78–100)
MONOCYTES # BLD AUTO: 0.6 10E9/L (ref 0–1.3)
MONOCYTES NFR BLD AUTO: 6.2 %
NEUTROPHILS # BLD AUTO: 8.3 10E9/L (ref 1.6–8.3)
NEUTROPHILS NFR BLD AUTO: 81.4 %
NRBC # BLD AUTO: 0.1 10*3/UL
NRBC BLD AUTO-RTO: 1 /100
PCO2 BLDV: 37 MM HG (ref 40–50)
PH BLDV: 7.52 PH (ref 7.32–7.43)
PLATELET # BLD AUTO: 189 10E9/L (ref 150–450)
PLATELET # BLD EST: ABNORMAL 10*3/UL
PO2 BLDV: 74 MM HG (ref 25–47)
POTASSIUM SERPL-SCNC: 3 MMOL/L (ref 3.4–5.3)
PROT SERPL-MCNC: 6.3 G/DL (ref 6.8–8.8)
RBC # BLD AUTO: 3.98 10E12/L (ref 4.4–5.9)
RBC MORPH BLD: NORMAL
SAO2 % BLDV FROM PO2: 96 %
SODIUM SERPL-SCNC: 132 MMOL/L (ref 133–144)
SPECIMEN SOURCE: ABNORMAL
WBC # BLD AUTO: 10.2 10E9/L (ref 4–11)

## 2020-01-15 PROCEDURE — 94640 AIRWAY INHALATION TREATMENT: CPT | Performed by: EMERGENCY MEDICINE

## 2020-01-15 PROCEDURE — 99291 CRITICAL CARE FIRST HOUR: CPT | Mod: 25 | Performed by: EMERGENCY MEDICINE

## 2020-01-15 PROCEDURE — 93005 ELECTROCARDIOGRAM TRACING: CPT | Performed by: EMERGENCY MEDICINE

## 2020-01-15 PROCEDURE — 87804 INFLUENZA ASSAY W/OPTIC: CPT | Performed by: EMERGENCY MEDICINE

## 2020-01-15 PROCEDURE — 87581 M.PNEUMON DNA AMP PROBE: CPT | Performed by: EMERGENCY MEDICINE

## 2020-01-15 PROCEDURE — 85610 PROTHROMBIN TIME: CPT | Performed by: EMERGENCY MEDICINE

## 2020-01-15 PROCEDURE — 85025 COMPLETE CBC W/AUTO DIFF WBC: CPT | Performed by: EMERGENCY MEDICINE

## 2020-01-15 PROCEDURE — 25800030 ZZH RX IP 258 OP 636: Performed by: EMERGENCY MEDICINE

## 2020-01-15 PROCEDURE — 82803 BLOOD GASES ANY COMBINATION: CPT

## 2020-01-15 PROCEDURE — 87077 CULTURE AEROBIC IDENTIFY: CPT | Performed by: EMERGENCY MEDICINE

## 2020-01-15 PROCEDURE — 25000132 ZZH RX MED GY IP 250 OP 250 PS 637: Performed by: EMERGENCY MEDICINE

## 2020-01-15 PROCEDURE — 87486 CHLMYD PNEUM DNA AMP PROBE: CPT | Performed by: EMERGENCY MEDICINE

## 2020-01-15 PROCEDURE — 80053 COMPREHEN METABOLIC PANEL: CPT | Performed by: EMERGENCY MEDICINE

## 2020-01-15 PROCEDURE — 83605 ASSAY OF LACTIC ACID: CPT

## 2020-01-15 PROCEDURE — 84145 PROCALCITONIN (PCT): CPT | Performed by: EMERGENCY MEDICINE

## 2020-01-15 PROCEDURE — 96361 HYDRATE IV INFUSION ADD-ON: CPT | Performed by: EMERGENCY MEDICINE

## 2020-01-15 PROCEDURE — 87633 RESP VIRUS 12-25 TARGETS: CPT | Performed by: EMERGENCY MEDICINE

## 2020-01-15 PROCEDURE — 25000125 ZZHC RX 250

## 2020-01-15 PROCEDURE — 87800 DETECT AGNT MULT DNA DIREC: CPT | Performed by: EMERGENCY MEDICINE

## 2020-01-15 PROCEDURE — 87040 BLOOD CULTURE FOR BACTERIA: CPT | Performed by: EMERGENCY MEDICINE

## 2020-01-15 PROCEDURE — 93010 ELECTROCARDIOGRAM REPORT: CPT | Mod: Z6 | Performed by: EMERGENCY MEDICINE

## 2020-01-15 PROCEDURE — 99285 EMERGENCY DEPT VISIT HI MDM: CPT | Mod: 25 | Performed by: EMERGENCY MEDICINE

## 2020-01-15 PROCEDURE — 83615 LACTATE (LD) (LDH) ENZYME: CPT | Performed by: EMERGENCY MEDICINE

## 2020-01-15 PROCEDURE — 83605 ASSAY OF LACTIC ACID: CPT | Performed by: EMERGENCY MEDICINE

## 2020-01-15 PROCEDURE — 71045 X-RAY EXAM CHEST 1 VIEW: CPT

## 2020-01-15 PROCEDURE — 87186 SC STD MICRODIL/AGAR DIL: CPT | Performed by: EMERGENCY MEDICINE

## 2020-01-15 RX ORDER — OSELTAMIVIR PHOSPHATE 75 MG/1
75 CAPSULE ORAL ONCE
Status: COMPLETED | OUTPATIENT
Start: 2020-01-15 | End: 2020-01-15

## 2020-01-15 RX ORDER — ACETAMINOPHEN 325 MG/1
975 TABLET ORAL ONCE
Status: COMPLETED | OUTPATIENT
Start: 2020-01-15 | End: 2020-01-15

## 2020-01-15 RX ORDER — IPRATROPIUM BROMIDE AND ALBUTEROL SULFATE 2.5; .5 MG/3ML; MG/3ML
3 SOLUTION RESPIRATORY (INHALATION) ONCE
Status: COMPLETED | OUTPATIENT
Start: 2020-01-15 | End: 2020-01-15

## 2020-01-15 RX ORDER — IPRATROPIUM BROMIDE AND ALBUTEROL SULFATE 2.5; .5 MG/3ML; MG/3ML
SOLUTION RESPIRATORY (INHALATION)
Status: COMPLETED
Start: 2020-01-15 | End: 2020-01-15

## 2020-01-15 RX ADMIN — IPRATROPIUM BROMIDE AND ALBUTEROL SULFATE 3 ML: 2.5; .5 SOLUTION RESPIRATORY (INHALATION) at 22:15

## 2020-01-15 RX ADMIN — OSELTAMIVIR PHOSPHATE 75 MG: 75 CAPSULE ORAL at 23:42

## 2020-01-15 RX ADMIN — ACETAMINOPHEN 975 MG: 325 TABLET, FILM COATED ORAL at 23:40

## 2020-01-15 RX ADMIN — IPRATROPIUM BROMIDE AND ALBUTEROL SULFATE 3 ML: .5; 3 SOLUTION RESPIRATORY (INHALATION) at 22:15

## 2020-01-15 RX ADMIN — SODIUM CHLORIDE 250 ML: 9 INJECTION, SOLUTION INTRAVENOUS at 22:21

## 2020-01-15 ASSESSMENT — ENCOUNTER SYMPTOMS
COUGH: 1
HEADACHES: 0
SHORTNESS OF BREATH: 1
ABDOMINAL PAIN: 0
FEVER: 1
COLOR CHANGE: 0
NECK STIFFNESS: 0
CONFUSION: 0
ARTHRALGIAS: 0
EYE REDNESS: 0
DIFFICULTY URINATING: 0

## 2020-01-15 ASSESSMENT — MIFFLIN-ST. JEOR: SCORE: 1913.03

## 2020-01-15 NOTE — ED AVS SNAPSHOT
[facility], [place], Emergency Department  [address] [place] [id number]  Phone:  [phone]                                    Jim Willingham   MRN: [id number]    Department:  [facility], Emergency Department   Date of Visit:  1/15/2020           After Visit Summary Signature Page    I have received my discharge instructions, and my questions have been answered. I have discussed any challenges I see with this plan with the nurse or doctor.    ..........................................................................................................................................  Patient/Patient Representative Signature      ..........................................................................................................................................  Patient Representative Print Name and Relationship to Patient    ..................................................               ................................................  Date                                   Time    ..........................................................................................................................................  Reviewed by Signature/Title    ...................................................              ..............................................  Date                                               Time          22EPIC Rev 08/18

## 2020-01-16 ENCOUNTER — APPOINTMENT (OUTPATIENT)
Dept: CARDIOLOGY | Facility: CLINIC | Age: 65
DRG: 194 | End: 2020-01-16
Attending: INTERNAL MEDICINE
Payer: COMMERCIAL

## 2020-01-16 ENCOUNTER — ANCILLARY PROCEDURE (OUTPATIENT)
Dept: CARDIOLOGY | Facility: CLINIC | Age: 65
DRG: 194 | End: 2020-01-16
Attending: STUDENT IN AN ORGANIZED HEALTH CARE EDUCATION/TRAINING PROGRAM
Payer: COMMERCIAL

## 2020-01-16 VITALS
TEMPERATURE: 99 F | WEIGHT: 251 LBS | DIASTOLIC BLOOD PRESSURE: 62 MMHG | RESPIRATION RATE: 16 BRPM | OXYGEN SATURATION: 96 % | BODY MASS INDEX: 38.04 KG/M2 | HEART RATE: 187 BPM | SYSTOLIC BLOOD PRESSURE: 71 MMHG | HEIGHT: 68 IN

## 2020-01-16 PROBLEM — J11.1 INFLUENZA: Status: ACTIVE | Noted: 2020-01-16

## 2020-01-16 LAB
ALBUMIN SERPL-MCNC: 3.4 G/DL (ref 3.4–5)
ALBUMIN UR-MCNC: 30 MG/DL
ALP SERPL-CCNC: 87 U/L (ref 40–150)
ALT SERPL W P-5'-P-CCNC: 47 U/L (ref 0–70)
ANION GAP SERPL CALCULATED.3IONS-SCNC: 8 MMOL/L (ref 3–14)
APPEARANCE UR: CLEAR
AST SERPL W P-5'-P-CCNC: 35 U/L (ref 0–45)
BILIRUB SERPL-MCNC: 1 MG/DL (ref 0.2–1.3)
BILIRUB UR QL STRIP: NEGATIVE
BUN SERPL-MCNC: 26 MG/DL (ref 7–30)
C PNEUM DNA SPEC QL NAA+PROBE: NOT DETECTED
CALCIUM SERPL-MCNC: 7.8 MG/DL (ref 8.5–10.1)
CHLORIDE SERPL-SCNC: 97 MMOL/L (ref 94–109)
CO2 SERPL-SCNC: 26 MMOL/L (ref 20–32)
COLOR UR AUTO: YELLOW
CREAT SERPL-MCNC: 1.16 MG/DL (ref 0.66–1.25)
ERYTHROCYTE [DISTWIDTH] IN BLOOD BY AUTOMATED COUNT: 13.9 % (ref 10–15)
FLUAV H1 2009 PAND RNA SPEC QL NAA+PROBE: NOT DETECTED
FLUAV H1 RNA SPEC QL NAA+PROBE: NOT DETECTED
FLUAV H3 RNA SPEC QL NAA+PROBE: NOT DETECTED
FLUAV RNA SPEC QL NAA+PROBE: NOT DETECTED
FLUBV RNA SPEC QL NAA+PROBE: ABNORMAL
GFR SERPL CREATININE-BSD FRML MDRD: 67 ML/MIN/{1.73_M2}
GLUCOSE BLDC GLUCOMTR-MCNC: 197 MG/DL (ref 70–99)
GLUCOSE BLDC GLUCOMTR-MCNC: 246 MG/DL (ref 70–99)
GLUCOSE BLDC GLUCOMTR-MCNC: 376 MG/DL (ref 70–99)
GLUCOSE SERPL-MCNC: 270 MG/DL (ref 70–99)
GLUCOSE UR STRIP-MCNC: 30 MG/DL
HADV DNA SPEC QL NAA+PROBE: NOT DETECTED
HCOV PNL SPEC NAA+PROBE: NOT DETECTED
HCT VFR BLD AUTO: 37.9 % (ref 40–53)
HGB BLD-MCNC: 11.9 G/DL (ref 13.3–17.7)
HGB UR QL STRIP: NEGATIVE
HMPV RNA SPEC QL NAA+PROBE: NOT DETECTED
HPIV1 RNA SPEC QL NAA+PROBE: NOT DETECTED
HPIV2 RNA SPEC QL NAA+PROBE: NOT DETECTED
HPIV3 RNA SPEC QL NAA+PROBE: NOT DETECTED
HPIV4 RNA SPEC QL NAA+PROBE: NOT DETECTED
HYALINE CASTS #/AREA URNS LPF: 6 /LPF (ref 0–2)
INR PPP: 1.98 (ref 0.86–1.14)
INTERPRETATION ECG - MUSE: NORMAL
KETONES UR STRIP-MCNC: 40 MG/DL
LDH SERPL L TO P-CCNC: 364 U/L (ref 85–227)
LEUKOCYTE ESTERASE UR QL STRIP: NEGATIVE
M PNEUMO DNA SPEC QL NAA+PROBE: NOT DETECTED
MAGNESIUM SERPL-MCNC: 1.5 MG/DL (ref 1.6–2.3)
MCH RBC QN AUTO: 29 PG (ref 26.5–33)
MCHC RBC AUTO-ENTMCNC: 31.4 G/DL (ref 31.5–36.5)
MCV RBC AUTO: 92 FL (ref 78–100)
MICROBIOLOGIST REVIEW: ABNORMAL
MUCOUS THREADS #/AREA URNS LPF: PRESENT /LPF
NITRATE UR QL: NEGATIVE
OSMOLALITY SERPL: 296 MMOL/KG (ref 280–301)
OSMOLALITY UR: 348 MMOL/KG (ref 100–1200)
PH UR STRIP: 5.5 PH (ref 5–7)
PLATELET # BLD AUTO: 197 10E9/L (ref 150–450)
POTASSIUM SERPL-SCNC: 3.9 MMOL/L (ref 3.4–5.3)
PROCALCITONIN SERPL-MCNC: 0.17 NG/ML
PROT SERPL-MCNC: 6.6 G/DL (ref 6.8–8.8)
RBC # BLD AUTO: 4.1 10E12/L (ref 4.4–5.9)
RBC #/AREA URNS AUTO: 0 /HPF (ref 0–2)
RSV RNA SPEC QL NAA+PROBE: NOT DETECTED
RSV RNA SPEC QL NAA+PROBE: NOT DETECTED
RV+EV RNA SPEC QL NAA+PROBE: NOT DETECTED
SODIUM SERPL-SCNC: 132 MMOL/L (ref 133–144)
SODIUM UR-SCNC: 29 MMOL/L
SOURCE: ABNORMAL
SP GR UR STRIP: 1.01 (ref 1–1.03)
UROBILINOGEN UR STRIP-MCNC: NORMAL MG/DL (ref 0–2)
WBC # BLD AUTO: 12.5 10E9/L (ref 4–11)
WBC #/AREA URNS AUTO: <1 /HPF (ref 0–5)

## 2020-01-16 PROCEDURE — 25000128 H RX IP 250 OP 636: Performed by: STUDENT IN AN ORGANIZED HEALTH CARE EDUCATION/TRAINING PROGRAM

## 2020-01-16 PROCEDURE — 25000132 ZZH RX MED GY IP 250 OP 250 PS 637: Performed by: STUDENT IN AN ORGANIZED HEALTH CARE EDUCATION/TRAINING PROGRAM

## 2020-01-16 PROCEDURE — 96365 THER/PROPH/DIAG IV INF INIT: CPT | Performed by: EMERGENCY MEDICINE

## 2020-01-16 PROCEDURE — 87186 SC STD MICRODIL/AGAR DIL: CPT | Performed by: EMERGENCY MEDICINE

## 2020-01-16 PROCEDURE — 99222 1ST HOSP IP/OBS MODERATE 55: CPT | Mod: GC | Performed by: INTERNAL MEDICINE

## 2020-01-16 PROCEDURE — 25800030 ZZH RX IP 258 OP 636: Performed by: STUDENT IN AN ORGANIZED HEALTH CARE EDUCATION/TRAINING PROGRAM

## 2020-01-16 PROCEDURE — 00000146 ZZHCL STATISTIC GLUCOSE BY METER IP

## 2020-01-16 PROCEDURE — 85027 COMPLETE CBC AUTOMATED: CPT | Performed by: INTERNAL MEDICINE

## 2020-01-16 PROCEDURE — 87086 URINE CULTURE/COLONY COUNT: CPT | Performed by: EMERGENCY MEDICINE

## 2020-01-16 PROCEDURE — 25000125 ZZHC RX 250: Performed by: STUDENT IN AN ORGANIZED HEALTH CARE EDUCATION/TRAINING PROGRAM

## 2020-01-16 PROCEDURE — 25000132 ZZH RX MED GY IP 250 OP 250 PS 637: Performed by: INTERNAL MEDICINE

## 2020-01-16 PROCEDURE — 84300 ASSAY OF URINE SODIUM: CPT | Performed by: INTERNAL MEDICINE

## 2020-01-16 PROCEDURE — 93306 TTE W/DOPPLER COMPLETE: CPT | Mod: 26 | Performed by: INTERNAL MEDICINE

## 2020-01-16 PROCEDURE — 80053 COMPREHEN METABOLIC PANEL: CPT | Performed by: INTERNAL MEDICINE

## 2020-01-16 PROCEDURE — 93284 PRGRMG EVAL IMPLANTABLE DFB: CPT | Mod: 26 | Performed by: INTERNAL MEDICINE

## 2020-01-16 PROCEDURE — 93284 PRGRMG EVAL IMPLANTABLE DFB: CPT

## 2020-01-16 PROCEDURE — 12000001 ZZH R&B MED SURG/OB UMMC

## 2020-01-16 PROCEDURE — 93750 INTERROGATION VAD IN PERSON: CPT

## 2020-01-16 PROCEDURE — 83735 ASSAY OF MAGNESIUM: CPT | Performed by: INTERNAL MEDICINE

## 2020-01-16 PROCEDURE — 85610 PROTHROMBIN TIME: CPT | Performed by: INTERNAL MEDICINE

## 2020-01-16 PROCEDURE — 83615 LACTATE (LD) (LDH) ENZYME: CPT | Performed by: INTERNAL MEDICINE

## 2020-01-16 PROCEDURE — 36415 COLL VENOUS BLD VENIPUNCTURE: CPT | Performed by: INTERNAL MEDICINE

## 2020-01-16 PROCEDURE — 25000131 ZZH RX MED GY IP 250 OP 636 PS 637: Performed by: STUDENT IN AN ORGANIZED HEALTH CARE EDUCATION/TRAINING PROGRAM

## 2020-01-16 PROCEDURE — 80048 BASIC METABOLIC PNL TOTAL CA: CPT | Performed by: INTERNAL MEDICINE

## 2020-01-16 PROCEDURE — 87088 URINE BACTERIA CULTURE: CPT | Performed by: EMERGENCY MEDICINE

## 2020-01-16 PROCEDURE — 93306 TTE W/DOPPLER COMPLETE: CPT

## 2020-01-16 PROCEDURE — 96366 THER/PROPH/DIAG IV INF ADDON: CPT | Performed by: EMERGENCY MEDICINE

## 2020-01-16 PROCEDURE — 81001 URINALYSIS AUTO W/SCOPE: CPT | Performed by: EMERGENCY MEDICINE

## 2020-01-16 PROCEDURE — 83930 ASSAY OF BLOOD OSMOLALITY: CPT | Performed by: INTERNAL MEDICINE

## 2020-01-16 PROCEDURE — 83935 ASSAY OF URINE OSMOLALITY: CPT | Performed by: INTERNAL MEDICINE

## 2020-01-16 RX ORDER — ACETAMINOPHEN 650 MG/1
650 SUPPOSITORY RECTAL EVERY 4 HOURS PRN
Status: DISCONTINUED | OUTPATIENT
Start: 2020-01-16 | End: 2020-01-16 | Stop reason: HOSPADM

## 2020-01-16 RX ORDER — ALLOPURINOL 100 MG/1
100 TABLET ORAL DAILY
Status: DISCONTINUED | OUTPATIENT
Start: 2020-01-16 | End: 2020-01-16 | Stop reason: HOSPADM

## 2020-01-16 RX ORDER — DEXTROSE MONOHYDRATE 25 G/50ML
25-50 INJECTION, SOLUTION INTRAVENOUS
Status: DISCONTINUED | OUTPATIENT
Start: 2020-01-16 | End: 2020-01-16 | Stop reason: HOSPADM

## 2020-01-16 RX ORDER — LEVALBUTEROL TARTRATE 45 UG/1
2 AEROSOL, METERED ORAL EVERY 4 HOURS PRN
Status: DISCONTINUED | OUTPATIENT
Start: 2020-01-16 | End: 2020-01-16 | Stop reason: HOSPADM

## 2020-01-16 RX ORDER — PREDNISONE 20 MG/1
40 TABLET ORAL DAILY
Status: DISCONTINUED | OUTPATIENT
Start: 2020-01-16 | End: 2020-01-16

## 2020-01-16 RX ORDER — BUMETANIDE 2 MG/1
2 TABLET ORAL 2 TIMES DAILY
Status: DISCONTINUED | OUTPATIENT
Start: 2020-01-16 | End: 2020-01-16 | Stop reason: HOSPADM

## 2020-01-16 RX ORDER — METOPROLOL TARTRATE 50 MG
50 TABLET ORAL 2 TIMES DAILY
Status: DISCONTINUED | OUTPATIENT
Start: 2020-01-16 | End: 2020-01-16

## 2020-01-16 RX ORDER — IPRATROPIUM BROMIDE AND ALBUTEROL SULFATE 2.5; .5 MG/3ML; MG/3ML
3 SOLUTION RESPIRATORY (INHALATION) EVERY 4 HOURS
Status: DISCONTINUED | OUTPATIENT
Start: 2020-01-16 | End: 2020-01-16 | Stop reason: HOSPADM

## 2020-01-16 RX ORDER — SPIRONOLACTONE 25 MG/1
25 TABLET ORAL DAILY
Status: DISCONTINUED | OUTPATIENT
Start: 2020-01-16 | End: 2020-01-16 | Stop reason: HOSPADM

## 2020-01-16 RX ORDER — POTASSIUM CHLORIDE 7.45 MG/ML
10 INJECTION INTRAVENOUS
Status: DISCONTINUED | OUTPATIENT
Start: 2020-01-16 | End: 2020-01-16 | Stop reason: HOSPADM

## 2020-01-16 RX ORDER — ASPIRIN 81 MG/1
81 TABLET, CHEWABLE ORAL DAILY
Status: DISCONTINUED | OUTPATIENT
Start: 2020-01-16 | End: 2020-01-16 | Stop reason: HOSPADM

## 2020-01-16 RX ORDER — POTASSIUM CHLORIDE 750 MG/1
20-40 TABLET, EXTENDED RELEASE ORAL
Status: DISCONTINUED | OUTPATIENT
Start: 2020-01-16 | End: 2020-01-16 | Stop reason: HOSPADM

## 2020-01-16 RX ORDER — POTASSIUM CHLORIDE 1.5 G/1.58G
20-40 POWDER, FOR SOLUTION ORAL
Status: DISCONTINUED | OUTPATIENT
Start: 2020-01-16 | End: 2020-01-16 | Stop reason: HOSPADM

## 2020-01-16 RX ORDER — WARFARIN SODIUM 7.5 MG/1
7.5 TABLET ORAL
Status: COMPLETED | OUTPATIENT
Start: 2020-01-16 | End: 2020-01-16

## 2020-01-16 RX ORDER — SPIRONOLACTONE 25 MG/1
25 TABLET ORAL 2 TIMES DAILY
Status: ON HOLD | COMMUNITY
End: 2020-08-07

## 2020-01-16 RX ORDER — GABAPENTIN 300 MG/1
300 CAPSULE ORAL 3 TIMES DAILY
Status: DISCONTINUED | OUTPATIENT
Start: 2020-01-16 | End: 2020-01-16 | Stop reason: HOSPADM

## 2020-01-16 RX ORDER — PREDNISONE 20 MG/1
40 TABLET ORAL DAILY
Qty: 8 TABLET | Refills: 0 | Status: ON HOLD | OUTPATIENT
Start: 2020-01-17 | End: 2020-01-21

## 2020-01-16 RX ORDER — ACETAMINOPHEN 325 MG/1
650 TABLET ORAL EVERY 6 HOURS PRN
Status: ON HOLD | COMMUNITY
End: 2021-02-09

## 2020-01-16 RX ORDER — POTASSIUM CHLORIDE 29.8 MG/ML
20 INJECTION INTRAVENOUS
Status: DISCONTINUED | OUTPATIENT
Start: 2020-01-16 | End: 2020-01-16 | Stop reason: HOSPADM

## 2020-01-16 RX ORDER — MAGNESIUM SULFATE HEPTAHYDRATE 40 MG/ML
4 INJECTION, SOLUTION INTRAVENOUS EVERY 4 HOURS PRN
Status: DISCONTINUED | OUTPATIENT
Start: 2020-01-16 | End: 2020-01-16 | Stop reason: HOSPADM

## 2020-01-16 RX ORDER — OSELTAMIVIR PHOSPHATE 75 MG/1
75 CAPSULE ORAL 2 TIMES DAILY
Qty: 8 CAPSULE | Refills: 0 | Status: ON HOLD | OUTPATIENT
Start: 2020-01-16 | End: 2020-01-21

## 2020-01-16 RX ORDER — OSELTAMIVIR PHOSPHATE 75 MG/1
75 CAPSULE ORAL 2 TIMES DAILY
Status: DISCONTINUED | OUTPATIENT
Start: 2020-01-16 | End: 2020-01-16 | Stop reason: HOSPADM

## 2020-01-16 RX ORDER — POTASSIUM CL/LIDO/0.9 % NACL 10MEQ/0.1L
10 INTRAVENOUS SOLUTION, PIGGYBACK (ML) INTRAVENOUS
Status: DISCONTINUED | OUTPATIENT
Start: 2020-01-16 | End: 2020-01-16 | Stop reason: HOSPADM

## 2020-01-16 RX ORDER — METOPROLOL SUCCINATE 25 MG/1
25 TABLET, EXTENDED RELEASE ORAL EVERY EVENING
Status: DISCONTINUED | OUTPATIENT
Start: 2020-01-16 | End: 2020-01-16 | Stop reason: HOSPADM

## 2020-01-16 RX ORDER — ALUMINA, MAGNESIA, AND SIMETHICONE 2400; 2400; 240 MG/30ML; MG/30ML; MG/30ML
30 SUSPENSION ORAL EVERY 4 HOURS PRN
Status: DISCONTINUED | OUTPATIENT
Start: 2020-01-16 | End: 2020-01-16 | Stop reason: HOSPADM

## 2020-01-16 RX ORDER — METOPROLOL SUCCINATE 50 MG/1
50 TABLET, EXTENDED RELEASE ORAL EVERY MORNING
Status: DISCONTINUED | OUTPATIENT
Start: 2020-01-16 | End: 2020-01-16 | Stop reason: HOSPADM

## 2020-01-16 RX ORDER — PREDNISONE 20 MG/1
40 TABLET ORAL DAILY
Status: DISCONTINUED | OUTPATIENT
Start: 2020-01-16 | End: 2020-01-16 | Stop reason: HOSPADM

## 2020-01-16 RX ORDER — NICOTINE POLACRILEX 4 MG
15-30 LOZENGE BUCCAL
Status: DISCONTINUED | OUTPATIENT
Start: 2020-01-16 | End: 2020-01-16 | Stop reason: HOSPADM

## 2020-01-16 RX ORDER — CYANOCOBALAMIN 1000 UG/ML
1000 INJECTION, SOLUTION INTRAMUSCULAR; SUBCUTANEOUS
Status: DISCONTINUED | OUTPATIENT
Start: 2020-01-16 | End: 2020-01-16

## 2020-01-16 RX ORDER — ASPIRIN 81 MG/1
324 TABLET, CHEWABLE ORAL ONCE
Status: DISCONTINUED | OUTPATIENT
Start: 2020-01-16 | End: 2020-01-16

## 2020-01-16 RX ORDER — PREDNISONE 10 MG/1
20 TABLET ORAL DAILY
COMMUNITY
End: 2020-01-16

## 2020-01-16 RX ORDER — TRAMADOL HYDROCHLORIDE 50 MG/1
100 TABLET ORAL EVERY 6 HOURS PRN
Status: DISCONTINUED | OUTPATIENT
Start: 2020-01-16 | End: 2020-01-16 | Stop reason: HOSPADM

## 2020-01-16 RX ORDER — GABAPENTIN 300 MG/1
CAPSULE ORAL
Status: ON HOLD | COMMUNITY
End: 2020-09-27

## 2020-01-16 RX ORDER — MONTELUKAST SODIUM 10 MG/1
10 TABLET ORAL AT BEDTIME
Status: DISCONTINUED | OUTPATIENT
Start: 2020-01-16 | End: 2020-01-16 | Stop reason: HOSPADM

## 2020-01-16 RX ORDER — POLYETHYLENE GLYCOL 3350 17 G/17G
17 POWDER, FOR SOLUTION ORAL DAILY PRN
Status: DISCONTINUED | OUTPATIENT
Start: 2020-01-16 | End: 2020-01-16 | Stop reason: HOSPADM

## 2020-01-16 RX ORDER — ZINC SULFATE 50(220)MG
220 CAPSULE ORAL 2 TIMES DAILY
Status: DISCONTINUED | OUTPATIENT
Start: 2020-01-16 | End: 2020-01-16 | Stop reason: HOSPADM

## 2020-01-16 RX ORDER — CITALOPRAM HYDROBROMIDE 20 MG/1
20 TABLET ORAL DAILY
Status: DISCONTINUED | OUTPATIENT
Start: 2020-01-16 | End: 2020-01-16 | Stop reason: HOSPADM

## 2020-01-16 RX ORDER — PANTOPRAZOLE SODIUM 40 MG/1
40 TABLET, DELAYED RELEASE ORAL EVERY MORNING
Status: DISCONTINUED | OUTPATIENT
Start: 2020-01-16 | End: 2020-01-16 | Stop reason: HOSPADM

## 2020-01-16 RX ORDER — LIDOCAINE 40 MG/G
CREAM TOPICAL
Status: DISCONTINUED | OUTPATIENT
Start: 2020-01-16 | End: 2020-01-16 | Stop reason: HOSPADM

## 2020-01-16 RX ORDER — ACETAMINOPHEN 325 MG/1
650 TABLET ORAL EVERY 4 HOURS PRN
Status: DISCONTINUED | OUTPATIENT
Start: 2020-01-16 | End: 2020-01-16 | Stop reason: HOSPADM

## 2020-01-16 RX ORDER — NITROGLYCERIN 0.4 MG/1
0.4 TABLET SUBLINGUAL EVERY 5 MIN PRN
Status: DISCONTINUED | OUTPATIENT
Start: 2020-01-16 | End: 2020-01-16 | Stop reason: HOSPADM

## 2020-01-16 RX ORDER — METOPROLOL TARTRATE 50 MG
50 TABLET ORAL ONCE
Status: COMPLETED | OUTPATIENT
Start: 2020-01-16 | End: 2020-01-16

## 2020-01-16 RX ADMIN — SPIRONOLACTONE 25 MG: 25 TABLET ORAL at 08:21

## 2020-01-16 RX ADMIN — PANTOPRAZOLE SODIUM 40 MG: 40 TABLET, DELAYED RELEASE ORAL at 08:20

## 2020-01-16 RX ADMIN — CITALOPRAM HYDROBROMIDE 20 MG: 20 TABLET ORAL at 08:22

## 2020-01-16 RX ADMIN — IPRATROPIUM BROMIDE AND ALBUTEROL SULFATE 3 ML: .5; 3 SOLUTION RESPIRATORY (INHALATION) at 04:56

## 2020-01-16 RX ADMIN — IPRATROPIUM BROMIDE AND ALBUTEROL SULFATE 3 ML: .5; 3 SOLUTION RESPIRATORY (INHALATION) at 08:25

## 2020-01-16 RX ADMIN — METOPROLOL TARTRATE 50 MG: 50 TABLET ORAL at 06:06

## 2020-01-16 RX ADMIN — WARFARIN SODIUM 7.5 MG: 7.5 TABLET ORAL at 16:46

## 2020-01-16 RX ADMIN — OSELTAMIVIR PHOSPHATE 75 MG: 75 CAPSULE ORAL at 08:19

## 2020-01-16 RX ADMIN — GABAPENTIN 300 MG: 300 CAPSULE ORAL at 08:20

## 2020-01-16 RX ADMIN — GABAPENTIN 300 MG: 300 CAPSULE ORAL at 13:42

## 2020-01-16 RX ADMIN — ASPIRIN 81 MG CHEWABLE TABLET 81 MG: 81 TABLET CHEWABLE at 08:19

## 2020-01-16 RX ADMIN — ZINC SULFATE 220 MG (50 MG) CAPSULE 220 MG: CAPSULE at 08:21

## 2020-01-16 RX ADMIN — IPRATROPIUM BROMIDE AND ALBUTEROL SULFATE 3 ML: .5; 3 SOLUTION RESPIRATORY (INHALATION) at 13:42

## 2020-01-16 RX ADMIN — ACETAMINOPHEN 650 MG: 325 TABLET, FILM COATED ORAL at 06:09

## 2020-01-16 RX ADMIN — SODIUM CHLORIDE 250 ML: 9 INJECTION, SOLUTION INTRAVENOUS at 09:40

## 2020-01-16 RX ADMIN — FLUTICASONE FUROATE AND VILANTEROL TRIFENATATE 1 PUFF: 200; 25 POWDER RESPIRATORY (INHALATION) at 08:24

## 2020-01-16 RX ADMIN — ALLOPURINOL 100 MG: 100 TABLET ORAL at 08:21

## 2020-01-16 RX ADMIN — UMECLIDINIUM 1 PUFF: 62.5 AEROSOL, POWDER ORAL at 08:25

## 2020-01-16 RX ADMIN — CALCIUM GLUCONATE 2 G: 98 INJECTION, SOLUTION INTRAVENOUS at 06:52

## 2020-01-16 RX ADMIN — Medication 37.5 MG: at 08:21

## 2020-01-16 RX ADMIN — INSULIN ASPART 5 UNITS: 100 INJECTION, SOLUTION INTRAVENOUS; SUBCUTANEOUS at 15:50

## 2020-01-16 RX ADMIN — PREDNISONE 40 MG: 20 TABLET ORAL at 08:20

## 2020-01-16 NOTE — ED NOTES
Initial Assessment: VSS. Communicates needs without difficulty. Pleasant and co-op. Denies SOB. Denies chest/shoulder/arm pain or discomfort. No acute distress noted.LVAD in use. Meal given at this time.

## 2020-01-16 NOTE — PROGRESS NOTES
Indication of Interrogation:  Other - admit for Influenza B    Type of VAD:  Heartmate 2    Current Parameters:  Flow= 4.9 lpm, Speed= 9400 rpm, Power= 5.6 carl[place], PI (if applicable)= 5.3    Abnormal Alarm on History:  No    Abnormal Events/Parameters Notes:  Yes, explain: some PI events, PI ranging 3.8-6.3, no speed drops.     Changes Made during Interrogation:  No

## 2020-01-16 NOTE — ED TRIAGE NOTES
Sudden respiratory distress that started this evening.  Pt has a heartmate 2 LVAD. Pt has increase SOB, cough, subjective fever at home for the last few days  EMS gave duo clarisse and put pt on CPAP

## 2020-01-16 NOTE — DISCHARGE SUMMARY
Cardiology Discharge Summary  Jim Willingham MRN: [id number]  1955  Primary care provider: Jovany Salas  ___________________________________          Date of Admission:  1/15/2020  Date of Discharge:  1/16/2020  Admitting Physician:  Precious Bautista MD  Discharge Physician:  Precious Bautista MD  Discharging Service:  Cardiology 2     Primary Provider: Jovany Salas         Reason for Admission:   Dyspnea          Discharge Diagnosis:   Influenza B infection  Mild euvolemic hyponatremia, likely SIADH         Procedures & Significant Findings:   ICD Interrogation, 1/16/20:  Patient seen in ED Rm 2 for evaluation and iterative programming of a Medtronic Bi-Ventricular ICD per MD orders.  Normal ICD function. 12 NSVT episodes recorded since last remote transmission on 1/8/20 lasting 1-2 seconds @ 200-250 bpm. 1 AT/AF episode recorded and is in progress since 1/15/20 @ 2038. Pt is on coumadin. AT/AF [place] = 0.5%. Intrinsic rhythm = AF w/ VS @ 105 bpm. AP = 1.9%.  = 93.7%. VSRP = 3%. OptiVol fluid index is elevated above the threshold indicating fluid retention. Estimated battery longevity to MARVA = 2.2 years. No short v-v intervals recorded. Lead trends appear stable. Patient is awaiting admission. Pt reports he has tested positive for Influenza B.         Consultations:   None         Relevant Results:     Influenza B positive    Sodium 132 mmol/L          Hospital Course by Problem:    64 year old male with PMH significant for NICM s/p LVAD placement in 2017, paroxysmal a.fib, and COPD presenting with acute respiratory distress and found to have influenza B.     # Acute respiratory distress  # Influenza B  # COPD  Respiratory status improved after several nebulizer treatments. Influenza +.    Returned to baseline room air oxygen requirement AM 1/16. Discharged in afternoon 1/16.  - Oseltamivir x 5 days  - Prednisone 40mg qday x 5 days  - Continue  "PTA inhaler therapies     # Nonischemic cardiomyopathy s/p LVAD (2017)  NYHA Class II, Stage D. EF <20%. HeartMate II. Stable on LVAD flow 9400. LDH stable.  -- Fluid status: Euvolemic  -- Diuretic: Bumex 2mg BID  -- ACEi/ARB: Losartan 37.5mg daily  -- BB: Metoprolol succinate 50/25 mg daily  -- Aldosterone antagonist: Spironolactone 25mg daily  -- Anticoagulation: warfarin (INR 2-3)  -- Antiplatelet: ASA-81mg  -- Statin: Not on statin therapy  -- SCD prophylaxis: Medtronic multi-lead ICD in place     # Paroxsymal a.fib  Has recently had a labile INR. Reports most recent dosing is 7.5mg daily. 0.5% a fib burden on device interrogation  - PTA metoprolol succinate 50mg qam, 25mg qpm  - Warfarin     # Euvolemic, normoosmolar hyponatremia  Appears euvolemic on exam so may be secondary to SIADH from influenza. Very mild, and likely will correct as he gets over the flu. Recommend BMP outpatient at hospital follow-up to confirm.     # Hypokalemia  - Repleted PRN     # Hypocalcemia  - calcium gluconate 2g IV x 1    # Anemia  Baseline hgb appears to be around 11. No evidence of bleeding while inpatient.     # Chronic kidney disease  Baseline Cr appears to be 1.3-1.4. Currently Cr below baseline.     # T2DM  - Metformin held, will resume on discharge  - Gabapentin 300mg TID     # Depression  - Citalopram 20mg daily     # GERD  - PTA pantoprazole     # Obesity s/p gastric bypass  - PTA zinc supplement  - PTA Pantoprazole    Follow up plan summary  1. Tamiflu prescribed, 5 day course  2. Prednisone increased from 20 mg to 40 mg, 5 day course prescribed  3. Follow up with PCP within 1 week    Physical Exam on day of Discharge:  Blood pressure (!) 71/62, pulse 187, temperature 99  F (37.2  C), temperature source Oral, resp. rate 16, height 1.727 m (5' 8\"), weight 113.9 kg (251 lb), SpO2 96 %.  General: AAOx3, no clubbing/cyanosis, no edema, no JVD  HEENT:  Supple, full ROM  Cardiac: LVAD hum, LVAD events without rate changes "   Pulm: CTAB, no wheezes, no crackles.  Abd: +BS, soft, non distended, non tender  Skin: No rash/petechiae  MSK: No deformities, no joint swelling  Neuro:  No new focal deficits  Lines/Tubes:  None           Discharge Medications:     Current Discharge Medication List      START taking these medications    Details   oseltamivir (TAMIFLU) 75 MG capsule Take 1 capsule (75 mg) by mouth 2 times daily for 4 days  Qty: 8 capsule, Refills: 0    Associated Diagnoses: Influenza         CONTINUE these medications which have CHANGED    Details   predniSONE (DELTASONE) 20 MG tablet Take 2 tablets (40 mg) by mouth daily  Qty: 8 tablet, Refills: 0    Associated Diagnoses: Shortness of breath         CONTINUE these medications which have NOT CHANGED    Details   acetaminophen (TYLENOL) 325 MG tablet Take 650 mg by mouth every 6 hours as needed for mild pain      albuterol (ALBUTEROL) 108 (90 BASE) MCG/ACT Inhaler Inhale 2 puffs into the lungs every 4 hours as needed for shortness of breath / dyspnea or wheezing      allopurinol (ZYLOPRIM) 100 MG tablet Take 1 tablet (100 mg) by mouth daily  Qty: 60 tablet, Refills: 5    Associated Diagnoses: Acute idiopathic gout, unspecified site      aspirin 81 MG chewable tablet 1 tablet (81 mg) by Oral or Feeding Tube route daily  Qty: 36 tablet    Associated Diagnoses: LVAD (left ventricular assist device) present (H)      bumetanide (BUMEX) 1 MG tablet Take 2 tablets (2 mg) by mouth 2 times daily  Qty: 120 tablet, Refills: 11    Associated Diagnoses: LVAD (left ventricular assist device) present (H); Chronic systolic heart failure (H)      citalopram (CELEXA) 20 MG tablet Take 20 mg by mouth daily      cyanocobalamin (VITAMIN B12) 1000 MCG/ML injection Inject 1,000 mcg into the muscle every 30 days      fluticasone-vilanterol (BREO ELLIPTA) 200-25 MCG/INH oral inhaler Inhale 1 puff into the lungs daily      gabapentin (NEURONTIN) 300 MG capsule Take 1 capsule (300 mg) by mouth twice daily  and 2 capsules (600 mg) by mouth at bedtime daily.      ipratropium - albuterol 0.5 mg/2.5 mg/3 mL (DUONEB) 0.5-2.5 (3) MG/3ML neb solution Take 3 mLs by nebulization every 4 hours as needed for shortness of breath / dyspnea or wheezing      losartan (COZAAR) 25 MG tablet Take 1.5 tablets (37.5 mg) by mouth daily  Qty: 135 tablet, Refills: 3    Associated Diagnoses: LVAD (left ventricular assist device) present (H)      metFORMIN (GLUCOPHAGE) 500 MG tablet Take 1 tablet (500 mg) by mouth 2 times daily (with meals)  Qty: 60 tablet, Refills: 0    Associated Diagnoses: 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, without long-term current use of insulin, unspecified CKD stage (H)      metoprolol succinate ER (TOPROL-XL) 25 MG 24 hr tablet Take 50mg in the morning and 25mg in the evening  Qty: 270 tablet, Refills: 2    Associated Diagnoses: Chronic systolic congestive heart failure (H); LVAD (left ventricular assist device) present (H)      montelukast (SINGULAIR) 10 MG tablet Take 10 mg by mouth At Bedtime      pantoprazole (PROTONIX) 40 MG EC tablet Take 1 tablet (40 mg) by mouth every morning  Qty: 60 tablet, Refills: 5    Associated Diagnoses: Gastroesophageal reflux disease without esophagitis      potassium chloride (K-TAB,KLOR-CON) 10 MEQ tablet Take 2 tablets (20 mEq) by mouth daily  Qty: 120 tablet, Refills: 5    Associated Diagnoses: LVAD (left ventricular assist device) present (H); Chronic systolic congestive heart failure (H)      spironolactone (ALDACTONE) 25 MG tablet Take 25 mg by mouth 2 times daily      traMADol (ULTRAM) 50 MG tablet Take 2 tablets (100 mg) by mouth every 6 hours as needed for moderate pain or breakthrough pain  Qty: 28 tablet    Associated Diagnoses: Acute post-operative pain      umeclidinium (INCRUSE ELLIPTA) 62.5 MCG/INH oral inhaler Inhale 1 puff into the lungs daily      warfarin (COUMADIN) 5 MG tablet Take 5 - 7.5mg daily or as directed by coumadin  clinic.  Qty: 90 tablet, Refills: 5    Associated Diagnoses: LVAD (left ventricular assist device) present (H); Chronic systolic congestive heart failure (H)      zinc sulfate (ZINCATE) 220 (50 ZN) MG capsule Take 220 mg by mouth 2 times daily                  Discharge Instructions and Follow-Up:     Discharge Procedure Orders   Reason for your hospital stay   Order Comments: You were admitted to the hospital because you have the flu. You were started on a flu medication called Tamiflu, or oseltamivir, which is known to reduce the duration of symptoms of the flu. We also increased your prednisone for your COPD. You will take both the prednisone and the Tamiflu for 4 more days. We also recommend that you follow up with your primary care provider Dr. Salas within about a week if possible.     Activity   Order Comments: Your activity upon discharge: activity as tolerated     Order Specific Question Answer Comments   Is discharge order? Yes      [facility]ult [facility] Follow-up and recommended labs and tests   Order Comments: Follow up with primary care provider, Jovany Salas, within 7 days for hospital follow- up.  The following labs/tests are recommended: BMP.      Appointments on [place] and/or [facility] (with [facility] or [facility] provider or service). Call [phone] if you haven't heard regarding these appointments within 7 days of discharge.     Full Code     Order Specific Question Answer Comments   Code status determined by: Discussion with patient/legal decision maker      Diet   Order Comments: Follow this diet upon discharge: Orders Placed This Encounter      2 Gram Sodium Diet     Order Specific Question Answer Comments   Is discharge order? Yes              Discharge Disposition:   Home         Condition on Discharge:     Discharge condition: Stable   Code status on discharge: Full Code        Date of service: 1/16/2020    Patient discussed with staff attending, Dr. Bautista and the note reflects our  joint plan.     Omer Eric MD  PGY-1, Internal Medicine  Cardiology Service  Pager: [phone]

## 2020-01-16 NOTE — ED PROVIDER NOTES
[place] EMERGENCY DEPARTMENT ([facility])  1/15/20   History     Chief Complaint   Patient presents with     Respiratory Distress     The history is provided by the patient and medical records.     Jim Willingham is a 64 year old male with history of paroxysmal A. fib, nonischemic cardiomyopathy on LVAD (since 2017), chronic kidney disease, COPD, asthma, type 2 diabetes, obesity status post gastric bypass who presents via EMS with respiratory distress.  He has had a cough and subjective fevers for the past few days.  Tonight patient was at home when he developed sudden shortness of breath and EMS was called.  EMS gave patient a DuoNeb and put him on CPAP.  Patient said his sister-in-law was sick all weekend with similar illness.  Patient denies any chest pain.  Patient denies any rash    I have reviewed the Medications, Allergies, Past Medical and Surgical History, and Social History in the Department of Health and Human Services system.  Past Medical History:   Diagnosis Date     Benign essential hypertension 5/11/2017     Cardiomyopathy, unspecified (H) 5/8/2017     CKD (chronic kidney disease) stage 3, GFR 30-59 ml/min (H) 5/11/2017     Depression 5/11/2017     Diabetes mellitus (H) 5/11/2017     H/O gastric bypass 5/11/2017     ICD (implantable cardioverter-defibrillator), biventricular, in situ 5/11/2017     LVAD (left ventricular assist device) present (H)      NICM (nonischemic cardiomyopathy) (H)/ EF 20% 5/11/2017    ECHO: LVEDd. 7.66 cm, Restrictive pattern , Severe mitral valve regurgitation     CECILIA (obstructive sleep apnea) 5/11/2017     Paroxysmal atrial fibrillation (H) 5/11/2017     Paroxysmal VT (H) 5/11/2017     Uncomplicated asthma      Vitamin B12 deficiency (non anemic) 5/11/2017       Past Surgical History:   Procedure Laterality Date     CV RIGHT HEART CATH N/A 7/24/2019    Procedure: CV RIGHT HEART CATH;  Surgeon: Renu Sears MD;  Location:  HEART CARDIAC CATH LAB     GI SURGERY  2003    [place]      "INSERT VENTRICULAR ASSIST DEVICE LEFT (HEARTMATE II) N/A 2017    Procedure: INSERT VENTRICULAR ASSIST DEVICE LEFT (HEARTMATE II);  Median Sternotomy Heartmate II Left Ventricular Assist Device Insertion on Pump Oxygenator;  Surgeon: Ronnie Quigley MD;  Location: [facility] OR     ORTHOPEDIC SURGERY      right knee wired       Family History   Problem Relation Age of Onset     Cerebrovascular Disease Mother      Diabetes Mother      Hypertension Mother      Coronary Artery Disease Father      Diabetes Type 2  Father        Social History     Tobacco Use     Smoking status: Former Smoker     Last attempt to quit:      Years since quittin.0     Smokeless tobacco: Never Used   Substance Use Topics     Alcohol use: No      Review of Systems   Constitutional: Positive for fever (subjective).   HENT: Negative for congestion.    Eyes: Negative for redness.   Respiratory: Positive for cough and shortness of breath.    Cardiovascular: Negative for chest pain.   Gastrointestinal: Negative for abdominal pain.   Genitourinary: Negative for difficulty urinating.   Musculoskeletal: Negative for arthralgias and neck stiffness.   Skin: Negative for color change.   Neurological: Negative for headaches.   Psychiatric/Behavioral: Negative for confusion.   All other systems reviewed and are negative.      Physical Exam   BP: 120/61  Pulse: 155  Temp: 99  F (37.2  C)  Resp: 25  Height: 172.7 cm (5' 8\")  Weight: 113.9 kg (251 lb)  SpO2: 100 %      Physical Exam  Constitutional:       Appearance: He is well-developed.   HENT:      Head: Normocephalic and atraumatic.   Neck:      Musculoskeletal: Normal range of motion and neck supple.   Cardiovascular:      Heart sounds: Normal heart sounds.      Comments: Mechanical LVAD sound.  Pulmonary:      Effort: Respiratory distress present.      Breath sounds: No stridor. No wheezing.      Comments: Mild decrease diffuse breath sounds.  Coarse breath sounds.  Tachypneic.  Abdominal:      " General: There is no distension.      Palpations: Abdomen is soft.      Tenderness: There is no abdominal tenderness. There is no rebound.   Musculoskeletal:         General: No swelling or tenderness.   Skin:     General: Skin is warm.   Neurological:      Mental Status: He is alert and oriented to person, place, and time.   Psychiatric:         Behavior: Behavior normal.         Thought Content: Thought content normal.         ED Course        Procedures             EKG Interpretation:      Interpreted by Shyanne Ordonez MD  Time reviewed: 2218  Symptoms at time of EKG: SOB   Rhythm: LVAD rythmic pattern   Rate: 150-160  Axis: Normal  Ectopy: none  Conduction: normal  ST Segments/ T Waves: Non-specific ST-T wave changes  Q Waves: none  Comparison to prior: Unchanged    Clinical Impression: LVAD                 Critical Care time:  30 minutes         Results for orders placed or performed during the hospital encounter of 01/15/20   Comprehensive metabolic panel     Status: Abnormal   Result Value Ref Range    Sodium 132 (L) 133 - 144 mmol/L    Potassium 3.0 (L) 3.4 - 5.3 mmol/L    Chloride 99 94 - 109 mmol/L    Carbon Dioxide 28 20 - 32 mmol/L    Anion Gap 6 3 - 14 mmol/L    Glucose 138 (H) 70 - 99 mg/dL    Urea Nitrogen 21 7 - 30 mg/dL    Creatinine 1.00 0.66 - 1.25 mg/dL    GFR Estimate 80 >60 mL/min/[1.73_m2]    GFR Estimate If Black >90 >60 mL/min/[1.73_m2]    Calcium 7.7 (L) 8.5 - 10.1 mg/dL    Bilirubin Total 0.9 0.2 - 1.3 mg/dL    Albumin 3.3 (L) 3.4 - 5.0 g/dL    Protein Total 6.3 (L) 6.8 - 8.8 g/dL    Alkaline Phosphatase 84 40 - 150 U/L    ALT 46 0 - 70 U/L    AST 38 0 - 45 U/L   Lactic acid whole blood     Status: None   Result Value Ref Range    Lactic Acid 1.4 0.7 - 2.0 mmol/L   INR     Status: Abnormal   Result Value Ref Range    INR 2.10 (H) 0.86 - 1.14   Lactate Dehydrogenase     Status: Abnormal   Result Value Ref Range    Lactate Dehydrogenase 355 (H) 85 - 227 U/L   CBC with platelets  differential     Status: Abnormal   Result Value Ref Range    WBC 10.2 4.0 - 11.0 10e9/L    RBC Count 3.98 (L) 4.4 - 5.9 10e12/L    Hemoglobin 11.6 (L) 13.3 - 17.7 g/dL    Hematocrit 36.8 (L) 40.0 - 53.0 %    MCV 93 78 - 100 fl    MCH 29.1 26.5 - 33.0 pg    MCHC 31.5 31.5 - 36.5 g/dL    RDW 13.7 10.0 - 15.0 %    Platelet Count 189 150 - 450 10e9/L    Diff Method Manual Differential     % Neutrophils 81.4 %    % Lymphocytes 11.5 %    % Monocytes 6.2 %    % Eosinophils 0.0 %    % Basophils 0.9 %    Nucleated RBCs 1 (H) 0 /100    Absolute Neutrophil 8.3 1.6 - 8.3 10e9/L    Absolute Lymphocytes 1.2 0.8 - 5.3 10e9/L    Absolute Monocytes 0.6 0.0 - 1.3 10e9/L    Absolute Eosinophils 0.0 0.0 - 0.7 10e9/L    Absolute Basophils 0.1 0.0 - 0.2 10e9/L    Absolute Nucleated RBC 0.1     RBC Morphology Normal     Platelet Estimate Confirming automated cell count    EKG 12 lead     Status: None (Preliminary result)   Result Value Ref Range    Interpretation ECG Click View Image link to view waveform and result    ISTAT gases lactate eunice POCT     Status: Abnormal   Result Value Ref Range    Ph Venous 7.52 (H) 7.32 - 7.43 pH    PCO2 Venous 37 (L) 40 - 50 mm Hg    PO2 Venous 74 (H) 25 - 47 mm Hg    Bicarbonate Venous 30 (H) 21 - 28 mmol/L    O2 Sat Venous 96 %    Lactic Acid 1.2 0.7 - 2.1 mmol/L   Influenza A/B antigen     Status: Abnormal   Result Value Ref Range    Influenza A/B Agn Specimen Nasopharyngeal     Influenza A Negative NEG^Negative    Influenza B Positive (A) NEG^Negative   Blood culture     Status: None (Preliminary result)   Result Value Ref Range    Specimen Description Blood Unspecified Site     Special Requests Aerobic and anaerobic bottles received     Culture Micro PENDING    Blood culture     Status: None (Preliminary result)   Result Value Ref Range    Specimen Description Blood Right Arm     Special Requests Aerobic and anaerobic bottles received     Culture Micro PENDING      Medications   0.9% sodium chloride  BOLUS (0 mLs Intravenous Stopped 1/15/20 2336)   ipratropium - albuterol 0.5 mg/2.5 mg/3 mL (DUONEB) neb solution 3 mL (3 mLs Nebulization Given 1/15/20 2215)   oseltamivir (TAMIFLU) capsule 75 mg (75 mg Oral Given 1/15/20 2342)   acetaminophen (TYLENOL) tablet 975 mg (975 mg Oral Given 1/15/20 2340)            Results for orders placed or performed during the hospital encounter of 01/15/20 (from the past 24 hour(s))   CBC with platelets differential   Result Value Ref Range    WBC 10.2 4.0 - 11.0 10e9/L    RBC Count 3.98 (L) 4.4 - 5.9 10e12/L    Hemoglobin 11.6 (L) 13.3 - 17.7 g/dL    Hematocrit 36.8 (L) 40.0 - 53.0 %    MCV 93 78 - 100 fl    MCH 29.1 26.5 - 33.0 pg    MCHC 31.5 31.5 - 36.5 g/dL    RDW 13.7 10.0 - 15.0 %    Platelet Count 189 150 - 450 10e9/L    Diff Method PENDING    ISTAT gases lactate eunice POCT   Result Value Ref Range    Ph Venous 7.52 (H) 7.32 - 7.43 pH    PCO2 Venous 37 (L) 40 - 50 mm Hg    PO2 Venous 74 (H) 25 - 47 mm Hg    Bicarbonate Venous 30 (H) 21 - 28 mmol/L    O2 Sat Venous 96 %    Lactic Acid 1.2 0.7 - 2.1 mmol/L   EKG 12 lead   Result Value Ref Range    Interpretation ECG Click View Image link to view waveform and result      Medications   0.9% sodium chloride BOLUS (250 mLs Intravenous New Bag 1/15/20 2221)   ipratropium - albuterol 0.5 mg/2.5 mg/3 mL (DUONEB) neb solution 3 mL (3 mLs Nebulization Given 1/15/20 2215)         Assessments & Plan (with Medical Decision Making)   Patient is a 64-year-old male with a history of LVAD who presents to the ER with a few days of worsening shortness of breath.  Patient had a positive sick contact at home.  Patient did not receive the flu vaccine this year.  Patient was initially transported on CPAP was able to come off the CPAP once his breathing was better controlled here in the ER.  Patient does have positive influenza B which is likely cause of his symptoms.  Patient has received repeated nebs and his breathing is improved.  Patient will be  admitted to the cardiology service for further care.    I have reviewed the nursing notes.    I have reviewed the findings, diagnosis, plan and need for follow up with the patient.    New Prescriptions    No medications on file       Final diagnoses:   LVAD (left ventricular assist device) present (H)   Influenza B   Shortness of breath       1/15/2020   [facility], [place], EMERGENCY DEPARTMENT     Shyanne Ordonez MD  01/15/20 3822

## 2020-01-16 NOTE — PHARMACY-ANTICOAGULATION SERVICE
Clinical Pharmacy - Warfarin Dosing Consult     Pharmacy has been consulted to manage this patient s warfarin therapy.  Indication: LVAD  PTA regimen (most recent): warfarin 7.5 mg daily  INR goal: 2-3  Last dose administered (mg/time): 7.5 mg yesterday (1/15) @ 1900     Anticoagulation Dose History     Recent Dosing and Labs Latest Ref Rng & Units 12/16/2019 12/20/2019 12/27/2019 1/3/2020 1/10/2020 1/15/2020 1/16/2020    INR 0.86 - 1.14 2.11(H) 2.36(H) 3.40(H) 2.55(H) 3.13(H) 2.10(H) 1.98(H)    INR 0.86 - 1.14 - - - - - - -    INR Point of Care 0.86 - 1.14 - - - - - - -        INR   Date Value Ref Range Status   01/16/2020 1.98 (H) 0.86 - 1.14 Final   01/15/2020 2.10 (H) 0.86 - 1.14 Final       Recommend warfarin 7.5 mg today.  Pharmacy will monitor Jim Willingham daily and order warfarin doses to achieve specified goal.      Please contact pharmacy as soon as possible if the warfarin needs to be held for a procedure or if the warfarin goals change.      Anastasia Gonzales, PharmD  PGY-1 Pharmacy Resident

## 2020-01-16 NOTE — H&P
"     Cardiology History and Physical   Jim Willingham MRN: [id number]  Age: 64 year old, : 20    Chief Complaint:    HPI:   Jim Willingham is a 64 year old male with PMH notable for NICM (EF 20%) s/p HeartMate II LVAD palcement (17) as destination therapy (obesity), COPD, type 2 DM, afib (on warfarin) who presented to the ED with acute onset of shortness of breath.     He reports on , he began to have a sore throat and felt like he was getting bronchitis. He subsequently developed body aches, headache, chills, fever (Tmax at home \"almost 101\"), cough productive of thick green/brown sputum, shortness of breath, and wheezing. He began using his duonebs at home with minimal relief. Over the past few days he has had decreased PO intake and has been urinating less. He reports when he was eating and drinking normally, he did not notice any changes in urination. His sister-in-law was sick last week with similar symptoms. He denies chest pain and feeling volume overloaded. He also denies any driveline redness, tenderness, and discharge.     En route to the ED he received a duoneb treatment (had taken 2 at home prior to calling EMS) and was placed on CPAP. Upon arrival to ED he received 2 more nebulizer treatments and was able to be weaned off CPAP. Labs were remarkable for WBC 10.2, pro-gucci 0.17, VBG with pH 7.52 and pCO2 of 37. Influenza B was positive. CXR without focal consolidation, with pulmonary vascular congestion, and stable LVAD position.    Of note, he did fall one time last week. He was walking into [employer] when he suddenly felt very weak and lightheaded and \"melted\" to the ground. He did not lose consciousness and he did not hit is head. Someone helped him to a chair and he rested for about 15 minutes and then felt back to normal. He denies any chest pain, palpitations, shortness of breath, or nausea associated with this episode. Yesterday, he had a similar sensation of feeling very " lightheaded like he was going to pass out, but was able to sit and rest and did not fall. He has right knee pain from the fall. No ICD events correlated with timing of fall.    He was sick late fall/early winter with a cold, but before then had increased his exercise tolerance to being able to ride the stationary bike for 10 minutes, and walk the dogs without difficulty. Due to the illness, the holidays, and now the flu, his exercise has tapered off.    Review of Systems:    Complete 10 point review of systems was performed and negative except per HPI.      Past Medical History    Reviewed and edited as appropriate  Past Medical History:   Diagnosis Date     Benign essential hypertension 5/11/2017     Cardiomyopathy, unspecified (H) 5/8/2017     CKD (chronic kidney disease) stage 3, GFR 30-59 ml/min (H) 5/11/2017     Depression 5/11/2017     Diabetes mellitus (H) 5/11/2017     H/O gastric bypass 5/11/2017     ICD (implantable cardioverter-defibrillator), biventricular, in situ 5/11/2017     LVAD (left ventricular assist device) present (H)      NICM (nonischemic cardiomyopathy) (H)/ EF 20% 5/11/2017    ECHO: LVEDd. 7.66 cm, Restrictive pattern , Severe mitral valve regurgitation     CECILIA (obstructive sleep apnea) 5/11/2017     Paroxysmal atrial fibrillation (H) 5/11/2017     Paroxysmal VT (H) 5/11/2017     Uncomplicated asthma      Vitamin B12 deficiency (non anemic) 5/11/2017       Past Surgical History   Reviewed and edited as appropriate   Past Surgical History:   Procedure Laterality Date     CV RIGHT HEART CATH N/A 7/24/2019    Procedure: CV RIGHT HEART CATH;  Surgeon: Renu Sears MD;  Location:  HEART CARDIAC CATH LAB     GI SURGERY  2003    [facility]     INSERT VENTRICULAR ASSIST DEVICE LEFT (HEARTMATE II) N/A 6/19/2017    Procedure: INSERT VENTRICULAR ASSIST DEVICE LEFT (HEARTMATE II);  Median Sternotomy Heartmate II Left Ventricular Assist Device Insertion on Pump Oxygenator;  Surgeon: Dann  MD Ronnie;  Location:  OR     ORTHOPEDIC SURGERY      right knee wired       Social History   Reviewed and edited as appropriate  Social History     Socioeconomic History     Marital status:      Spouse name: Not on file     Number of children: Not on file     Years of education: Not on file     Highest education level: Not on file   Occupational History     Not on file   Social Needs     Financial resource strain: Not on file     Food insecurity:     Worry: Not on file     Inability: Not on file     Transportation needs:     Medical: Not on file     Non-medical: Not on file   Tobacco Use     Smoking status: Former Smoker     Last attempt to quit:      Years since quittin.0     Smokeless tobacco: Never Used   Substance and Sexual Activity     Alcohol use: No     Drug use: No     Sexual activity: Yes     Partners: Female   Lifestyle     Physical activity:     Days per week: Not on file     Minutes per session: Not on file     Stress: Not on file   Relationships     Social connections:     Talks on phone: Not on file     Gets together: Not on file     Attends Muslim service: Not on file     Active member of club or organization: Not on file     Attends meetings of clubs or organizations: Not on file     Relationship status: Not on file     Intimate partner violence:     Fear of current or ex partner: Not on file     Emotionally abused: Not on file     Physically abused: Not on file     Forced sexual activity: Not on file   Other Topics Concern     Parent/sibling w/ CABG, MI or angioplasty before 65F 55M? No   Social History Narrative     Not on file       Family History     Reviewed and edited as appropriate  Family History   Problem Relation Age of Onset     Cerebrovascular Disease Mother      Diabetes Mother      Hypertension Mother      Coronary Artery Disease Father      Diabetes Type 2  Father        Allergies   Reviewed and edited as appropriate     Allergies   Allergen Reactions      "Beer Shortness Of Breath     Grass Shortness Of Breath     Ace Inhibitors Unknown     Dust Mites Other (See Comments)     Asthma     Mold Other (See Comments)     Asthma     Penicillins      Sulfa Drugs Unknown and Other (See Comments)     PN: unknown     Strawberries [Strawberry] Itching        Prior to Admission Medications    (Not in a hospital admission)       Physical Exam   /61   Pulse 155   Temp 99  F (37.2  C) (Oral)   Resp 16   Ht 1.727 m (5' 8\")   Wt 113.9 kg (251 lb)   SpO2 98%   BMI 38.16 kg/m    No intake or output data in the 24 hours ending 01/16/20 0357  Wt:   Wt Readings from Last 2 Encounters:   01/15/20 113.9 kg (251 lb)   09/27/19 121.3 kg (267 lb 8 oz)      GEN: pleasant, no acute distress  HEENT: NCAT, anicteric sclerae, MMM  CV: LVAD hum, no JVP noted   CHEST: diffuse expiratory wheezing heard throughout all lung fields, decreased breath sounds at bilateral bases  ABD: soft, non-tender, normal active bowel sounds, no erythema, tenderness, or discharge surrounding driveline site  EXTR: minimal peripheral edema, warm, well-perfused  NEURO: alert oriented, speech fluent/appropriate    Data   Labs and imaging below were independently reviewed and interpreted    BMP  Recent Labs   Lab 01/15/20  2208   *   POTASSIUM 3.0*   CHLORIDE 99   QAMAR 7.7*   CO2 28   BUN 21   CR 1.00   *     CBC  Recent Labs   Lab 01/15/20  2208   WBC 10.2   RBC 3.98*   HGB 11.6*   HCT 36.8*   MCV 93   MCH 29.1   MCHC 31.5   RDW 13.7        INR  Recent Labs   Lab 01/15/20  2208 01/10/20  0739   INR 2.10* 3.13*     LFTs  Recent Labs   Lab 01/15/20  2208   ALKPHOS 84   AST 38   ALT 46   BILITOTAL 0.9   PROTTOTAL 6.3*   ALBUMIN 3.3*      Troponin  Lab Results   Component Value Date    TROPI 0.017 02/12/2019         Imaging:  CXR: 1. Mild cardiomegaly with pulmonary venous congestion. No focal  pneumonia.  2. Stable positioning of LVAD.    EKG:    Transthoracic echocardiogram:3/19/1/9 "   Interpretation Summary  HM2 LVAD at 9400 RPM.  Severely dilated LV with severely reduced global LV function, LVEF<20%.  LVIDd=6.1 cm.  Wtbadv-kx-lsmnojtynn dilated RV with moderately reduced global RV function.  The ventricular septum is midline. The aortic valve opens minimally with each  beat. There is mild AI.  LVAD inflow cannula is visualized in the LV apex. LVAD outflow graft is  visualized in the aorta. Normal Doppler interrogation of the LVAD inflow  cannula and outflow graft.    Right Heart Cath: 7/24/19    High normal right sided filling pressures with moderately reduced cardiac index suggestive of RV failure    Normal left sided filling pressure    High RA to PCWP suggestive of RV dysfunction    High normal PA pressure. No evidence of Pulmonary HYpertension    ICD device interrogation 1/8/20  Patient has a Medtronic multi lead ICD. Normal ICD function. 2 NSVT episodes recorded on 1/7/2020 @ 1525 and 1651 - 1 sec, 240-261 bpm. 1 AT/AF episode recorded - 6 seconds. Patient is taking Warfarin. Presenting EGM = AS- @ 83 bpm. AP = 3.3%.  = 95.2%. BVP = 90.8%. VSR pace = 4.5%. OptiVol fluid index is elevated above baseline. Patient denies signs/symptoms of fluid retention. Estimated battery longevity to MARVA = 2.2 years. No short v-v intervals recorded. Lead trends appear stable. Rajani Abdi, LVAD RN and Dr. Montgomery notified of above via epic note. NINO Owen RN    Assessment and Recommendation:  64 year old male with PMH significant for NICM s/p LVAD placement in 2017, paroxysmal a.fib, and COPD presenting with acute respiratory distress and found to have influenza B.    # Acute respiratory distress  # Influenza B  # COPD  Respiratory status improved after several nebulizer treatments. Influenza +. Suspect flu exacerbating COPD.  - oseltamivir x 5 days  - prednisone 40mg qday x 5 days  - changed PTA albuterol inh to levalbuterol inhaler q4h given tachycardia  - PTA fluticasone-vilanterol inhaler  - PTA  singulair 10mg at bedtime  - PTA Incruse ellipta inhaler  - duoneb q4h    # Nonischemic cardiomyopathy s/p LVAD (2017)  NYHA Class II, Stage D. EF <20%. HeartMate II. Stable on LVAD flow 9400. LDH stable. ICD recently interrogated with 1 6 sec AT/AF episode and 2 NSVT episodes lasting 1 second  -- Fluid status: Euvolemic  -- Diuretic: Bumex 2mg BID  -- ACEi/ARB: Continue losartan 37.5mg daily  -- BB: Continue metoprolol succinate 50/25 mg daily  -- Aldosterone antagonist: spironolactone 25mg daily  -- Anticoagulation: warfarin (INR 2-3)- pharmacy consult to dose given recent fluctuations  -- Antiplatelet: Continue ASA-81mg  -- Statin: Not on statin therapy  -- SCD prophylaxis: Medtronic multi-lead ICD in place  -- Monitor on telemetry  -- Strict I/Os and Daily weights  -- LVAD orderset    # Paroxsymal a.fib  Has recently had a labile INR. Reports most recent dosing is 7.5mg daily. Minimal a.fib burden on device interrogation  - PTA metoprolol succinate 50mg qam, 25mg qpm  - warfarin- pharmacy to dose consult placed, appreciate assistance    # Hyponatremia  Appears euvolemic on exam so may be secondary to SIADH from influenza. UA with slightly elevated specific gravity and hyaline casts suggesting hypovolemic hyponatremia. As patient is able to eat and drink more, likely sodium will correct.  - Uosm  - Kaylen  - Sosm    # Hypokalemia  - electrolyte replacement protocol  - may need PTA oral K dose increased on discharge    # Hypocalcemia  - calcium gluconate 2g IV x 1    # Anemia  Baseline hgb appears to be around 11. Currently at baseline without any evidence of bleeding.  - continue to monitor    # Chronic kidney disease  Baseline Cr appears to be 1.3-1.4. Currently Cr below baseline  - Avoid nephrotoxic agents  - Continue to monitor with daily creatinine  - Strict I/O  - Daily weights    # T2DM  - Med sliding scale insulin  - hypoglycemia protocol  - finger sticks QID  - hold PTA metformin  - gabapentin 300mg TID    #  Depression  - Citalopram 20mg daily    # GERD  - PTA pantoprazole    # Obesity s/p gastric bypass  - PTA zinc supplement  - PTA Pantoprazole    Diet: 2g Na diet  DVT: Warfarin  Code: Full    Patient will be formally staffed in the morning.    Tanvi Polk MD  Internal Medicine PGY2  Pager [phone]

## 2020-01-16 NOTE — PHARMACY-ADMISSION MEDICATION HISTORY
Admission medication history interview status for the 1/15/2020 admission is complete. See Epic admission navigator for allergy information, pharmacy, prior to admission medications and immunization status.     Medication history interview sources:  [facility] [phone], patient interview, Coumadin clinic dose calendar/encounter notes    Changes made to PTA medication list (reason)  Added: Prednisone  Deleted: Enoxaparin  Changed: Acetaminophen from scheduled q6 to PRN, spironolactone from 25mg daily to 25mg BID    Additional medication history information (including reliability of information, actions taken by pharmacist):  -Patient has alprazolam 0.5 mg (#2) and clindamycin 150 mg (#4) rx's for a dental procedure he is due to complete this upcoming Monday (1/20) - opted to not add to med list given one-time future doses.  -Patient is due for a vitamin B12 injection (last dose 12/16/19).  -Patient's current warfarin regimen as of 1/10/20's anticoagulation note and patient report is 7.5 mg daily. His last dose was yesterday evening at about 7PM.    Prior to Admission medications    Medication Sig Last Dose Taking? Auth Provider   acetaminophen (TYLENOL) 325 MG tablet Take 650 mg by mouth every 6 hours as needed for mild pain Past Week at [facility] Yes Unknown, Entered By History   albuterol (ALBUTEROL) 108 (90 BASE) MCG/ACT Inhaler Inhale 2 puffs into the lungs every 4 hours as needed for shortness of breath / dyspnea or wheezing 1/15/2020 at [facility] Yes Reported, Patient   allopurinol (ZYLOPRIM) 100 MG tablet Take 1 tablet (100 mg) by mouth daily 1/15/2020 at AM Yes Delisa Montgomery MD   aspirin 81 MG chewable tablet 1 tablet (81 mg) by Oral or Feeding Tube route daily 1/15/2020 at AM Yes Madina Laguna PA   bumetanide (BUMEX) 1 MG tablet Take 2 tablets (2 mg) by mouth 2 times daily 1/15/2020 at PM Yes Cate Marshall, NP   citalopram (CELEXA) 20 MG tablet Take 20 mg by mouth daily 1/15/2020 at AM Yes  Reported, Patient   cyanocobalamin (VITAMIN B12) 1000 MCG/ML injection Inject 1,000 mcg into the muscle every 30 days 12/16/2019 at AM Yes Reported, Patient   fluticasone-vilanterol (BREO ELLIPTA) 200-25 MCG/INH oral inhaler Inhale 1 puff into the lungs daily 1/15/2020 at AM Yes Reported, Patient   gabapentin (NEURONTIN) 300 MG capsule Take 1 capsule (300 mg) by mouth twice daily and 2 capsules (600 mg) by mouth at bedtime daily. 1/15/2020 at PM Yes Unknown, Entered By History   ipratropium - albuterol 0.5 mg/2.5 mg/3 mL (DUONEB) 0.5-2.5 (3) MG/3ML neb solution Take 3 mLs by nebulization every 4 hours as needed for shortness of breath / dyspnea or wheezing 1/15/2020 at [facility] Yes Reported, Patient   losartan (COZAAR) 25 MG tablet Take 1.5 tablets (37.5 mg) by mouth daily 1/15/2020 at AM Yes Shelia Means NP   metFORMIN (GLUCOPHAGE) 500 MG tablet Take 1 tablet (500 mg) by mouth 2 times daily (with meals) 1/15/2020 at PM Yes Shelia Means NP   metoprolol succinate ER (TOPROL-XL) 25 MG 24 hr tablet Take 50mg in the morning and 25mg in the evening 1/15/2020 at PM Yes Delisa Montgomery MD   montelukast (SINGULAIR) 10 MG tablet Take 10 mg by mouth At Bedtime 1/15/2020 at [facility] Yes Reported, Patient   pantoprazole (PROTONIX) 40 MG EC tablet Take 1 tablet (40 mg) by mouth every morning 1/15/2020 at AM Yes Shelia Means NP   potassium chloride (K-TAB,KLOR-CON) 10 MEQ tablet Take 2 tablets (20 mEq) by mouth daily 1/15/2020 at AM Yes Delisa Montgomery MD   predniSONE (DELTASONE) 10 MG tablet Take 20 mg by mouth daily 1/15/2020 at PM Yes Unknown, Entered By History   spironolactone (ALDACTONE) 25 MG tablet Take 25 mg by mouth 2 times daily 1/15/2020 at PM Yes Unknown, Entered By History   traMADol (ULTRAM) 50 MG tablet Take 2 tablets (100 mg) by mouth every 6 hours as needed for moderate pain or breakthrough pain 1/15/2020 at [facility] Yes Nash Amado PA   umeclidinium (INCRUSE ELLIPTA) 62.5 MCG/INH  oral inhaler Inhale 1 puff into the lungs daily 1/15/2020 at AM Yes Reported, Patient   warfarin (COUMADIN) 5 MG tablet Take 5 - 7.5mg daily or as directed by coumadin clinic. 1/15/2020 at 1900 Yes Delisa Montgomery MD   zinc sulfate (ZINCATE) 220 (50 ZN) MG capsule Take 220 mg by mouth 2 times daily 1/15/2020 at PM Yes Reported, Patient     Medication history completed by:   Anastasia Gonzales, PharmD  PGY-1 Pharmacy Resident

## 2020-01-16 NOTE — ED NOTES
[facility], [place]   ED Nurse to Floor Handoff     Jim Willingham is a 64 year old male who speaks English and lives with a spouse,  in a home  They arrived in the ED by ambulance from home    ED Chief Complaint: Respiratory Distress    ED Dx;   Final diagnoses:   LVAD (left ventricular assist device) present (H)   Influenza B   Shortness of breath         Needed?: No    Allergies:   Allergies   Allergen Reactions     Beer Shortness Of Breath     Grass Shortness Of Breath     Ace Inhibitors Unknown     Dust Mites Other (See Comments)     Asthma     Mold Other (See Comments)     Asthma     Penicillins      Sulfa Drugs Unknown and Other (See Comments)     PN: unknown     Strawberries [Strawberry] Itching   .  Past Medical Hx:   Past Medical History:   Diagnosis Date     Benign essential hypertension 5/11/2017     Cardiomyopathy, unspecified (H) 5/8/2017     CKD (chronic kidney disease) stage 3, GFR 30-59 ml/min (H) 5/11/2017     Depression 5/11/2017     Diabetes mellitus (H) 5/11/2017     H/O gastric bypass 5/11/2017     ICD (implantable cardioverter-defibrillator), biventricular, in situ 5/11/2017     LVAD (left ventricular assist device) present (H)      NICM (nonischemic cardiomyopathy) (H)/ EF 20% 5/11/2017    ECHO: LVEDd. 7.66 cm, Restrictive pattern , Severe mitral valve regurgitation     CECILIA (obstructive sleep apnea) 5/11/2017     Paroxysmal atrial fibrillation (H) 5/11/2017     Paroxysmal VT (H) 5/11/2017     Uncomplicated asthma      Vitamin B12 deficiency (non anemic) 5/11/2017      Baseline Mental status: WDL  Current Mental Status changes: at basesline    Infection present or suspected this encounter: yes respiratory  Sepsis suspected: No  Isolation type: Droplet     Activity level - Baseline/Home:  Independent  Activity Level - Current:   Independent    Bariatric equipment needed?: No    In the ED these meds were given:   Medications   0.9% sodium chloride BOLUS (0  mLs Intravenous Stopped 1/15/20 2336)   ipratropium - albuterol 0.5 mg/2.5 mg/3 mL (DUONEB) neb solution 3 mL (3 mLs Nebulization Given 1/15/20 2215)   oseltamivir (TAMIFLU) capsule 75 mg (75 mg Oral Given 1/15/20 2342)   acetaminophen (TYLENOL) tablet 975 mg (975 mg Oral Given 1/15/20 2340)       Drips running?  No    Home pump  No    Current LDAs  Peripheral IV 01/15/20 Left Wrist (Active)   Number of days: 0       Incision/Surgical Site 06/19/17 Chest (Active)   Number of days: 940       Incision/Surgical Site 07/01/17 Sternum (Active)   Number of days: 928       Incision/Surgical Site 07/01/17 (Active)   Number of days: 928       Incision/Surgical Site 07/01/17 Right Abdomen (Active)   Number of days: 928       Labs results:   Labs Ordered and Resulted from Time of ED Arrival Up to the Time of Departure from the ED   COMPREHENSIVE METABOLIC PANEL - Abnormal; Notable for the following components:       Result Value    Sodium 132 (*)     Potassium 3.0 (*)     Glucose 138 (*)     Calcium 7.7 (*)     Albumin 3.3 (*)     Protein Total 6.3 (*)     All other components within normal limits   INR - Abnormal; Notable for the following components:    INR 2.10 (*)     All other components within normal limits   LACTATE DEHYDROGENASE - Abnormal; Notable for the following components:    Lactate Dehydrogenase 355 (*)     All other components within normal limits   CBC WITH PLATELETS DIFFERENTIAL - Abnormal; Notable for the following components:    RBC Count 3.98 (*)     Hemoglobin 11.6 (*)     Hematocrit 36.8 (*)     Nucleated RBCs 1 (*)     All other components within normal limits   ISTAT  GASES LACTATE KP POCT - Abnormal; Notable for the following components:    Ph Venous 7.52 (*)     PCO2 Venous 37 (*)     PO2 Venous 74 (*)     Bicarbonate Venous 30 (*)     All other components within normal limits   INFLUENZA A/B ANTIGEN - Abnormal; Notable for the following components:    Influenza B Positive (*)     All other  "components within normal limits   LACTIC ACID WHOLE BLOOD   PROCALCITONIN   ROUTINE UA WITH MICROSCOPIC   ISTAT TROPONIN NURSING POCT   RESPIRATORY PANEL PCR - NP SWAB   BLOOD CULTURE   BLOOD CULTURE   URINE CULTURE AEROBIC BACTERIAL       Imaging Studies: No results found for this or any previous visit (from the past 24 hour(s)).    Recent vital signs:   /61   Pulse 155   Temp 99  F (37.2  C) (Oral)   Resp 16   Ht 1.727 m (5' 8\")   Wt 113.9 kg (251 lb)   SpO2 93%   BMI 38.16 kg/m      [place] Coma Scale Score: 15 (01/15/20 2209)       Cardiac Rhythm: Other- LVAD  Pt needs tele? No- not ordered for ED  Skin/wound Issues: None    Code Status: Full Code    Pain control: fair    Nausea control: pt had none    Abnormal labs/tests/findings requiring intervention: Influenza B positive    Family present during ED course? Yes   Family Comments/Social Situation comments: wife at bedside    Tasks needing completion: None    Amina Calix RN  [facility] -- [phone] [place] ED  [phone] [facility] ED      "

## 2020-01-17 ENCOUNTER — ANTICOAGULATION THERAPY VISIT (OUTPATIENT)
Dept: ANTICOAGULATION | Facility: CLINIC | Age: 65
End: 2020-01-17

## 2020-01-17 DIAGNOSIS — Z79.01 LONG TERM CURRENT USE OF ANTICOAGULANT THERAPY: ICD-10-CM

## 2020-01-17 DIAGNOSIS — Z95.811 LVAD (LEFT VENTRICULAR ASSIST DEVICE) PRESENT (H): ICD-10-CM

## 2020-01-17 NOTE — ED NOTES
Patient was reportedly admitted to Cardiology service and reportedly discharged earlier today. ED Charge RN and lab informaing discharging service (Cards 2) of patient's positive blood cultures so they can call patient and have return for further workup. The Cardiology team reported they will take care of notifying patient and having return for further urgent re-evaluation/management.  They are to inform us in the ED if they need assistance in reaching patient to have them return for re-evaluation, etc.      Maria R Irwin MD  01/16/20 1923

## 2020-01-17 NOTE — PROGRESS NOTES
Addendum 1/20/20. Patient reports he's going to be admitted to the hospital. [facility]                ANTICOAGULATION  MANAGEMENT: Discharge Review    Jim Willingham chart reviewed for anticoagulation continuity of care    Hospital Admission on 01/15 to 01/16 for Dyspnea and was diagnosed with Influenza B.    Discharge disposition: Home    INR Results:    Recent Labs   Lab Test 01/16/20  0549   INR 1.98*       Warfarin inpatient management: home regimen continued    Warfarin discharge instructions: home regimen continued (7.5mg Daily)     Medication Changes Affecting Anticoagulation: Yes: Tamiflu 75mg BID for 4 days and Prednisone 40mg Daily (8 days)    Additional Factors Affecting Anticoagulation: No    Plan     No adjustment to anticoagulation plan needed    Left a voice message verifying the Warfarin dose and to instruct pt to come in on Monday 1/20 for an INR.    Anticoagulation Calendar updated    Radha Pedro RN

## 2020-01-18 LAB
BACTERIA SPEC CULT: ABNORMAL
Lab: ABNORMAL
SPECIMEN SOURCE: ABNORMAL

## 2020-01-19 LAB
BACTERIA SPEC CULT: ABNORMAL
Lab: ABNORMAL
SPECIMEN SOURCE: ABNORMAL

## 2020-01-20 ENCOUNTER — APPOINTMENT (OUTPATIENT)
Dept: GENERAL RADIOLOGY | Facility: CLINIC | Age: 65
DRG: 190 | End: 2020-01-20
Attending: EMERGENCY MEDICINE
Payer: COMMERCIAL

## 2020-01-20 ENCOUNTER — HOSPITAL ENCOUNTER (INPATIENT)
Facility: CLINIC | Age: 65
LOS: 4 days | Discharge: HOME | DRG: 190 | End: 2020-01-24
Attending: EMERGENCY MEDICINE | Admitting: INTERNAL MEDICINE
Payer: COMMERCIAL

## 2020-01-20 DIAGNOSIS — J44.1 COPD EXACERBATION (H): ICD-10-CM

## 2020-01-20 DIAGNOSIS — E11.40 TYPE 2 DIABETES MELLITUS WITH DIABETIC NEUROPATHY, WITHOUT LONG-TERM CURRENT USE OF INSULIN (H): Primary | ICD-10-CM

## 2020-01-20 DIAGNOSIS — I50.22 CHRONIC SYSTOLIC HEART FAILURE (H): ICD-10-CM

## 2020-01-20 DIAGNOSIS — Z95.811 LVAD (LEFT VENTRICULAR ASSIST DEVICE) PRESENT (H): ICD-10-CM

## 2020-01-20 DIAGNOSIS — J10.1 INFLUENZA B: ICD-10-CM

## 2020-01-20 DIAGNOSIS — R06.02 SHORTNESS OF BREATH: ICD-10-CM

## 2020-01-20 DIAGNOSIS — Z95.811 HISTORY OF LEFT VENTRICULAR ASSIST DEVICE (LVAD) (H): ICD-10-CM

## 2020-01-20 PROBLEM — R06.00 DYSPNEA: Status: ACTIVE | Noted: 2020-01-20

## 2020-01-20 LAB
ANION GAP SERPL CALCULATED.3IONS-SCNC: 5 MMOL/L (ref 3–14)
BASOPHILS # BLD AUTO: 0 10E9/L (ref 0–0.2)
BASOPHILS NFR BLD AUTO: 0.1 %
BUN SERPL-MCNC: 40 MG/DL (ref 7–30)
CALCIUM SERPL-MCNC: 8.5 MG/DL (ref 8.5–10.1)
CHLORIDE SERPL-SCNC: 99 MMOL/L (ref 94–109)
CO2 SERPL-SCNC: 29 MMOL/L (ref 20–32)
CREAT SERPL-MCNC: 1.18 MG/DL (ref 0.66–1.25)
DIFFERENTIAL METHOD BLD: ABNORMAL
EOSINOPHIL # BLD AUTO: 0 10E9/L (ref 0–0.7)
EOSINOPHIL NFR BLD AUTO: 0 %
ERYTHROCYTE [DISTWIDTH] IN BLOOD BY AUTOMATED COUNT: 14.3 % (ref 10–15)
GFR SERPL CREATININE-BSD FRML MDRD: 65 ML/MIN/{1.73_M2}
GLUCOSE SERPL-MCNC: 273 MG/DL (ref 70–99)
HCT VFR BLD AUTO: 38.1 % (ref 40–53)
HGB BLD-MCNC: 11.6 G/DL (ref 13.3–17.7)
IMM GRANULOCYTES # BLD: 0.1 10E9/L (ref 0–0.4)
IMM GRANULOCYTES NFR BLD: 1.5 %
LACTATE BLD-SCNC: 2.1 MMOL/L (ref 0.7–2)
LYMPHOCYTES # BLD AUTO: 0.7 10E9/L (ref 0.8–5.3)
LYMPHOCYTES NFR BLD AUTO: 6.9 %
MCH RBC QN AUTO: 28.4 PG (ref 26.5–33)
MCHC RBC AUTO-ENTMCNC: 30.4 G/DL (ref 31.5–36.5)
MCV RBC AUTO: 93 FL (ref 78–100)
MDC_IDC_EPISODE_DTM: NORMAL
MDC_IDC_EPISODE_DURATION: 0 S
MDC_IDC_EPISODE_DURATION: 0 S
MDC_IDC_EPISODE_DURATION: 1 S
MDC_IDC_EPISODE_DURATION: 10 S
MDC_IDC_EPISODE_DURATION: 12 S
MDC_IDC_EPISODE_DURATION: 12 S
MDC_IDC_EPISODE_DURATION: 13 S
MDC_IDC_EPISODE_DURATION: 13 S
MDC_IDC_EPISODE_DURATION: 1993 S
MDC_IDC_EPISODE_DURATION: 2 S
MDC_IDC_EPISODE_DURATION: 25 S
MDC_IDC_EPISODE_DURATION: 26 S
MDC_IDC_EPISODE_DURATION: 38 S
MDC_IDC_EPISODE_ID: 287
MDC_IDC_EPISODE_ID: 288
MDC_IDC_EPISODE_ID: 289
MDC_IDC_EPISODE_ID: 290
MDC_IDC_EPISODE_ID: 291
MDC_IDC_EPISODE_ID: 292
MDC_IDC_EPISODE_ID: 293
MDC_IDC_EPISODE_ID: 294
MDC_IDC_EPISODE_ID: 295
MDC_IDC_EPISODE_ID: 296
MDC_IDC_EPISODE_ID: 297
MDC_IDC_EPISODE_ID: 298
MDC_IDC_EPISODE_ID: 299
MDC_IDC_EPISODE_ID: 300
MDC_IDC_EPISODE_ID: 301
MDC_IDC_EPISODE_ID: 9497
MDC_IDC_EPISODE_ID: 9498
MDC_IDC_EPISODE_ID: 9499
MDC_IDC_EPISODE_ID: 9500
MDC_IDC_EPISODE_ID: 9501
MDC_IDC_EPISODE_ID: 9502
MDC_IDC_EPISODE_ID: 9503
MDC_IDC_EPISODE_TYPE: NORMAL
MDC_IDC_LEAD_IMPLANT_DT: NORMAL
MDC_IDC_LEAD_LOCATION: NORMAL
MDC_IDC_LEAD_LOCATION_DETAIL_1: NORMAL
MDC_IDC_LEAD_MFG: NORMAL
MDC_IDC_LEAD_MODEL: NORMAL
MDC_IDC_LEAD_POLARITY_TYPE: NORMAL
MDC_IDC_LEAD_SERIAL: NORMAL
MDC_IDC_MSMT_BATTERY_DTM: NORMAL
MDC_IDC_MSMT_BATTERY_REMAINING_LONGEVITY: 27 MO
MDC_IDC_MSMT_BATTERY_RRT_TRIGGER: 2.73
MDC_IDC_MSMT_BATTERY_STATUS: NORMAL
MDC_IDC_MSMT_BATTERY_VOLTAGE: 2.93 V
MDC_IDC_MSMT_CAP_CHARGE_DTM: NORMAL
MDC_IDC_MSMT_CAP_CHARGE_ENERGY: 18 J
MDC_IDC_MSMT_CAP_CHARGE_TIME: 4.28
MDC_IDC_MSMT_CAP_CHARGE_TYPE: NORMAL
MDC_IDC_MSMT_LEADCHNL_LV_IMPEDANCE_VALUE: 399 OHM
MDC_IDC_MSMT_LEADCHNL_LV_IMPEDANCE_VALUE: 4047 OHM
MDC_IDC_MSMT_LEADCHNL_LV_IMPEDANCE_VALUE: 4047 OHM
MDC_IDC_MSMT_LEADCHNL_LV_PACING_THRESHOLD_AMPLITUDE: 0.62 V
MDC_IDC_MSMT_LEADCHNL_LV_PACING_THRESHOLD_AMPLITUDE: 0.75 V
MDC_IDC_MSMT_LEADCHNL_LV_PACING_THRESHOLD_PULSEWIDTH: 0.4 MS
MDC_IDC_MSMT_LEADCHNL_LV_PACING_THRESHOLD_PULSEWIDTH: 0.4 MS
MDC_IDC_MSMT_LEADCHNL_RA_IMPEDANCE_VALUE: 437 OHM
MDC_IDC_MSMT_LEADCHNL_RA_SENSING_INTR_AMPL: 2 MV
MDC_IDC_MSMT_LEADCHNL_RA_SENSING_INTR_AMPL: 3 MV
MDC_IDC_MSMT_LEADCHNL_RV_IMPEDANCE_VALUE: 247 OHM
MDC_IDC_MSMT_LEADCHNL_RV_IMPEDANCE_VALUE: 380 OHM
MDC_IDC_MSMT_LEADCHNL_RV_PACING_THRESHOLD_AMPLITUDE: 1.12 V
MDC_IDC_MSMT_LEADCHNL_RV_PACING_THRESHOLD_AMPLITUDE: 1.25 V
MDC_IDC_MSMT_LEADCHNL_RV_PACING_THRESHOLD_PULSEWIDTH: 0.4 MS
MDC_IDC_MSMT_LEADCHNL_RV_PACING_THRESHOLD_PULSEWIDTH: 0.4 MS
MDC_IDC_MSMT_LEADCHNL_RV_SENSING_INTR_AMPL: 6.62 MV
MDC_IDC_MSMT_LEADCHNL_RV_SENSING_INTR_AMPL: 8 MV
MDC_IDC_PG_IMPLANT_DTM: NORMAL
MDC_IDC_PG_MFG: NORMAL
MDC_IDC_PG_MODEL: NORMAL
MDC_IDC_PG_SERIAL: NORMAL
MDC_IDC_PG_TYPE: NORMAL
MDC_IDC_SESS_CLINIC_NAME: NORMAL
MDC_IDC_SESS_DTM: NORMAL
MDC_IDC_SESS_TYPE: NORMAL
MDC_IDC_SET_BRADY_AT_MODE_SWITCH_RATE: 150 {BEATS}/MIN
MDC_IDC_SET_BRADY_LOWRATE: 50 {BEATS}/MIN
MDC_IDC_SET_BRADY_MAX_SENSOR_RATE: 130 {BEATS}/MIN
MDC_IDC_SET_BRADY_MAX_TRACKING_RATE: 130 {BEATS}/MIN
MDC_IDC_SET_BRADY_MODE: NORMAL
MDC_IDC_SET_BRADY_PAV_DELAY_LOW: 150 MS
MDC_IDC_SET_BRADY_SAV_DELAY_LOW: 110 MS
MDC_IDC_SET_CRT_LVRV_DELAY: 0 MS
MDC_IDC_SET_CRT_PACED_CHAMBERS: NORMAL
MDC_IDC_SET_LEADCHNL_LV_PACING_AMPLITUDE: 1.75 V
MDC_IDC_SET_LEADCHNL_LV_PACING_ANODE_ELECTRODE_1: NORMAL
MDC_IDC_SET_LEADCHNL_LV_PACING_ANODE_LOCATION_1: NORMAL
MDC_IDC_SET_LEADCHNL_LV_PACING_CAPTURE_MODE: NORMAL
MDC_IDC_SET_LEADCHNL_LV_PACING_CATHODE_ELECTRODE_1: NORMAL
MDC_IDC_SET_LEADCHNL_LV_PACING_CATHODE_LOCATION_1: NORMAL
MDC_IDC_SET_LEADCHNL_LV_PACING_POLARITY: NORMAL
MDC_IDC_SET_LEADCHNL_LV_PACING_PULSEWIDTH: 0.4 MS
MDC_IDC_SET_LEADCHNL_RA_PACING_AMPLITUDE: 2.5 V
MDC_IDC_SET_LEADCHNL_RA_PACING_ANODE_ELECTRODE_1: NORMAL
MDC_IDC_SET_LEADCHNL_RA_PACING_ANODE_LOCATION_1: NORMAL
MDC_IDC_SET_LEADCHNL_RA_PACING_CAPTURE_MODE: NORMAL
MDC_IDC_SET_LEADCHNL_RA_PACING_CATHODE_ELECTRODE_1: NORMAL
MDC_IDC_SET_LEADCHNL_RA_PACING_CATHODE_LOCATION_1: NORMAL
MDC_IDC_SET_LEADCHNL_RA_PACING_POLARITY: NORMAL
MDC_IDC_SET_LEADCHNL_RA_PACING_PULSEWIDTH: 0.4 MS
MDC_IDC_SET_LEADCHNL_RA_SENSING_ANODE_ELECTRODE_1: NORMAL
MDC_IDC_SET_LEADCHNL_RA_SENSING_ANODE_LOCATION_1: NORMAL
MDC_IDC_SET_LEADCHNL_RA_SENSING_CATHODE_ELECTRODE_1: NORMAL
MDC_IDC_SET_LEADCHNL_RA_SENSING_CATHODE_LOCATION_1: NORMAL
MDC_IDC_SET_LEADCHNL_RA_SENSING_POLARITY: NORMAL
MDC_IDC_SET_LEADCHNL_RA_SENSING_SENSITIVITY: 0.45 MV
MDC_IDC_SET_LEADCHNL_RV_PACING_AMPLITUDE: 2.25 V
MDC_IDC_SET_LEADCHNL_RV_PACING_ANODE_ELECTRODE_1: NORMAL
MDC_IDC_SET_LEADCHNL_RV_PACING_ANODE_LOCATION_1: NORMAL
MDC_IDC_SET_LEADCHNL_RV_PACING_CAPTURE_MODE: NORMAL
MDC_IDC_SET_LEADCHNL_RV_PACING_CATHODE_ELECTRODE_1: NORMAL
MDC_IDC_SET_LEADCHNL_RV_PACING_CATHODE_LOCATION_1: NORMAL
MDC_IDC_SET_LEADCHNL_RV_PACING_POLARITY: NORMAL
MDC_IDC_SET_LEADCHNL_RV_PACING_PULSEWIDTH: 0.4 MS
MDC_IDC_SET_LEADCHNL_RV_SENSING_ANODE_ELECTRODE_1: NORMAL
MDC_IDC_SET_LEADCHNL_RV_SENSING_ANODE_LOCATION_1: NORMAL
MDC_IDC_SET_LEADCHNL_RV_SENSING_CATHODE_ELECTRODE_1: NORMAL
MDC_IDC_SET_LEADCHNL_RV_SENSING_CATHODE_LOCATION_1: NORMAL
MDC_IDC_SET_LEADCHNL_RV_SENSING_POLARITY: NORMAL
MDC_IDC_SET_LEADCHNL_RV_SENSING_SENSITIVITY: 0.3 MV
MDC_IDC_SET_ZONE_DETECTION_BEATS_DENOMINATOR: 40 {BEATS}
MDC_IDC_SET_ZONE_DETECTION_BEATS_NUMERATOR: 30 {BEATS}
MDC_IDC_SET_ZONE_DETECTION_INTERVAL: 240 MS
MDC_IDC_SET_ZONE_DETECTION_INTERVAL: 320 MS
MDC_IDC_SET_ZONE_DETECTION_INTERVAL: 360 MS
MDC_IDC_SET_ZONE_DETECTION_INTERVAL: 400 MS
MDC_IDC_SET_ZONE_DETECTION_INTERVAL: 400 MS
MDC_IDC_SET_ZONE_TYPE: NORMAL
MDC_IDC_STAT_AT_BURDEN_PERCENT: 0.5 %
MDC_IDC_STAT_AT_DTM_END: NORMAL
MDC_IDC_STAT_AT_DTM_START: NORMAL
MDC_IDC_STAT_BRADY_AP_VP_PERCENT: 1.93 %
MDC_IDC_STAT_BRADY_AP_VS_PERCENT: 0.04 %
MDC_IDC_STAT_BRADY_AS_VP_PERCENT: 94.58 %
MDC_IDC_STAT_BRADY_AS_VS_PERCENT: 3.46 %
MDC_IDC_STAT_BRADY_DTM_END: NORMAL
MDC_IDC_STAT_BRADY_DTM_START: NORMAL
MDC_IDC_STAT_BRADY_RA_PERCENT_PACED: 1.93 %
MDC_IDC_STAT_BRADY_RV_PERCENT_PACED: 1.26 %
MDC_IDC_STAT_CRT_DTM_END: NORMAL
MDC_IDC_STAT_CRT_DTM_START: NORMAL
MDC_IDC_STAT_CRT_LV_PERCENT_PACED: 93.66 %
MDC_IDC_STAT_CRT_PERCENT_PACED: 93.66 %
MDC_IDC_STAT_EPISODE_RECENT_COUNT: 0
MDC_IDC_STAT_EPISODE_RECENT_COUNT: 1
MDC_IDC_STAT_EPISODE_RECENT_COUNT: 18
MDC_IDC_STAT_EPISODE_RECENT_COUNT: 3
MDC_IDC_STAT_EPISODE_RECENT_COUNT_DTM_END: NORMAL
MDC_IDC_STAT_EPISODE_RECENT_COUNT_DTM_START: NORMAL
MDC_IDC_STAT_EPISODE_TOTAL_COUNT: 0
MDC_IDC_STAT_EPISODE_TOTAL_COUNT: 13
MDC_IDC_STAT_EPISODE_TOTAL_COUNT: 14
MDC_IDC_STAT_EPISODE_TOTAL_COUNT: 182
MDC_IDC_STAT_EPISODE_TOTAL_COUNT: 4
MDC_IDC_STAT_EPISODE_TOTAL_COUNT_DTM_END: NORMAL
MDC_IDC_STAT_EPISODE_TOTAL_COUNT_DTM_START: NORMAL
MDC_IDC_STAT_EPISODE_TYPE: NORMAL
MDC_IDC_STAT_TACHYTHERAPY_ATP_DELIVERED_RECENT: 0
MDC_IDC_STAT_TACHYTHERAPY_ATP_DELIVERED_TOTAL: 13
MDC_IDC_STAT_TACHYTHERAPY_RECENT_DTM_END: NORMAL
MDC_IDC_STAT_TACHYTHERAPY_RECENT_DTM_START: NORMAL
MDC_IDC_STAT_TACHYTHERAPY_SHOCKS_ABORTED_RECENT: 0
MDC_IDC_STAT_TACHYTHERAPY_SHOCKS_ABORTED_TOTAL: 1
MDC_IDC_STAT_TACHYTHERAPY_SHOCKS_DELIVERED_RECENT: 0
MDC_IDC_STAT_TACHYTHERAPY_SHOCKS_DELIVERED_TOTAL: 2
MDC_IDC_STAT_TACHYTHERAPY_TOTAL_DTM_END: NORMAL
MDC_IDC_STAT_TACHYTHERAPY_TOTAL_DTM_START: NORMAL
MONOCYTES # BLD AUTO: 0.7 10E9/L (ref 0–1.3)
MONOCYTES NFR BLD AUTO: 7.4 %
NEUTROPHILS # BLD AUTO: 8.1 10E9/L (ref 1.6–8.3)
NEUTROPHILS NFR BLD AUTO: 84.1 %
NRBC # BLD AUTO: 0.1 10*3/UL
NRBC BLD AUTO-RTO: 1 /100
PLATELET # BLD AUTO: 283 10E9/L (ref 150–450)
POTASSIUM SERPL-SCNC: 4 MMOL/L (ref 3.4–5.3)
RBC # BLD AUTO: 4.09 10E12/L (ref 4.4–5.9)
SODIUM SERPL-SCNC: 134 MMOL/L (ref 133–144)
TROPONIN I SERPL-MCNC: 0.04 UG/L (ref 0–0.04)
WBC # BLD AUTO: 9.7 10E9/L (ref 4–11)

## 2020-01-20 PROCEDURE — 25800030 ZZH RX IP 258 OP 636: Performed by: EMERGENCY MEDICINE

## 2020-01-20 PROCEDURE — 80048 BASIC METABOLIC PNL TOTAL CA: CPT | Performed by: EMERGENCY MEDICINE

## 2020-01-20 PROCEDURE — 99285 EMERGENCY DEPT VISIT HI MDM: CPT | Mod: 25 | Performed by: EMERGENCY MEDICINE

## 2020-01-20 PROCEDURE — 85025 COMPLETE CBC W/AUTO DIFF WBC: CPT | Performed by: EMERGENCY MEDICINE

## 2020-01-20 PROCEDURE — 93005 ELECTROCARDIOGRAM TRACING: CPT | Performed by: EMERGENCY MEDICINE

## 2020-01-20 PROCEDURE — 25000128 H RX IP 250 OP 636: Performed by: EMERGENCY MEDICINE

## 2020-01-20 PROCEDURE — 87040 BLOOD CULTURE FOR BACTERIA: CPT | Performed by: EMERGENCY MEDICINE

## 2020-01-20 PROCEDURE — 96361 HYDRATE IV INFUSION ADD-ON: CPT | Performed by: EMERGENCY MEDICINE

## 2020-01-20 PROCEDURE — 25000125 ZZHC RX 250: Performed by: EMERGENCY MEDICINE

## 2020-01-20 PROCEDURE — 12000001 ZZH R&B MED SURG/OB UMMC

## 2020-01-20 PROCEDURE — 93010 ELECTROCARDIOGRAM REPORT: CPT | Mod: Z6 | Performed by: EMERGENCY MEDICINE

## 2020-01-20 PROCEDURE — 83615 LACTATE (LD) (LDH) ENZYME: CPT | Performed by: EMERGENCY MEDICINE

## 2020-01-20 PROCEDURE — 84484 ASSAY OF TROPONIN QUANT: CPT | Performed by: EMERGENCY MEDICINE

## 2020-01-20 PROCEDURE — 94640 AIRWAY INHALATION TREATMENT: CPT | Performed by: EMERGENCY MEDICINE

## 2020-01-20 PROCEDURE — 84484 ASSAY OF TROPONIN QUANT: CPT | Performed by: STUDENT IN AN ORGANIZED HEALTH CARE EDUCATION/TRAINING PROGRAM

## 2020-01-20 PROCEDURE — 96374 THER/PROPH/DIAG INJ IV PUSH: CPT | Performed by: EMERGENCY MEDICINE

## 2020-01-20 PROCEDURE — 83605 ASSAY OF LACTIC ACID: CPT | Performed by: EMERGENCY MEDICINE

## 2020-01-20 PROCEDURE — 71046 X-RAY EXAM CHEST 2 VIEWS: CPT

## 2020-01-20 RX ORDER — METHYLPREDNISOLONE SODIUM SUCCINATE 125 MG/2ML
125 INJECTION, POWDER, LYOPHILIZED, FOR SOLUTION INTRAMUSCULAR; INTRAVENOUS ONCE
Status: COMPLETED | OUTPATIENT
Start: 2020-01-20 | End: 2020-01-20

## 2020-01-20 RX ORDER — PANTOPRAZOLE SODIUM 40 MG/1
40 TABLET, DELAYED RELEASE ORAL
Status: DISCONTINUED | OUTPATIENT
Start: 2020-01-21 | End: 2020-01-24 | Stop reason: HOSPADM

## 2020-01-20 RX ORDER — NICOTINE POLACRILEX 4 MG
15-30 LOZENGE BUCCAL
Status: DISCONTINUED | OUTPATIENT
Start: 2020-01-20 | End: 2020-01-24 | Stop reason: HOSPADM

## 2020-01-20 RX ORDER — MONTELUKAST SODIUM 10 MG/1
10 TABLET ORAL AT BEDTIME
Status: DISCONTINUED | OUTPATIENT
Start: 2020-01-20 | End: 2020-01-24 | Stop reason: HOSPADM

## 2020-01-20 RX ORDER — SPIRONOLACTONE 25 MG/1
25 TABLET ORAL 2 TIMES DAILY
Status: DISCONTINUED | OUTPATIENT
Start: 2020-01-20 | End: 2020-01-24 | Stop reason: HOSPADM

## 2020-01-20 RX ORDER — GABAPENTIN 300 MG/1
300 CAPSULE ORAL 2 TIMES DAILY
Status: DISCONTINUED | OUTPATIENT
Start: 2020-01-21 | End: 2020-01-24 | Stop reason: HOSPADM

## 2020-01-20 RX ORDER — PREDNISONE 20 MG/1
40 TABLET ORAL DAILY
Status: DISCONTINUED | OUTPATIENT
Start: 2020-01-21 | End: 2020-01-24 | Stop reason: HOSPADM

## 2020-01-20 RX ORDER — TRAMADOL HYDROCHLORIDE 50 MG/1
100 TABLET ORAL EVERY 6 HOURS PRN
Status: DISCONTINUED | OUTPATIENT
Start: 2020-01-20 | End: 2020-01-24 | Stop reason: HOSPADM

## 2020-01-20 RX ORDER — NALOXONE HYDROCHLORIDE 0.4 MG/ML
.1-.4 INJECTION, SOLUTION INTRAMUSCULAR; INTRAVENOUS; SUBCUTANEOUS
Status: DISCONTINUED | OUTPATIENT
Start: 2020-01-20 | End: 2020-01-24 | Stop reason: HOSPADM

## 2020-01-20 RX ORDER — METOPROLOL SUCCINATE 50 MG/1
50 TABLET, EXTENDED RELEASE ORAL EVERY MORNING
Status: DISCONTINUED | OUTPATIENT
Start: 2020-01-21 | End: 2020-01-24 | Stop reason: HOSPADM

## 2020-01-20 RX ORDER — METOPROLOL SUCCINATE 25 MG/1
25 TABLET, EXTENDED RELEASE ORAL EVERY EVENING
Status: DISCONTINUED | OUTPATIENT
Start: 2020-01-20 | End: 2020-01-24 | Stop reason: HOSPADM

## 2020-01-20 RX ORDER — BUMETANIDE 1 MG/1
2 TABLET ORAL
Status: DISCONTINUED | OUTPATIENT
Start: 2020-01-21 | End: 2020-01-21

## 2020-01-20 RX ORDER — IPRATROPIUM BROMIDE AND ALBUTEROL SULFATE 2.5; .5 MG/3ML; MG/3ML
3 SOLUTION RESPIRATORY (INHALATION) ONCE
Status: COMPLETED | OUTPATIENT
Start: 2020-01-20 | End: 2020-01-20

## 2020-01-20 RX ORDER — DEXTROSE MONOHYDRATE 25 G/50ML
25-50 INJECTION, SOLUTION INTRAVENOUS
Status: DISCONTINUED | OUTPATIENT
Start: 2020-01-20 | End: 2020-01-24 | Stop reason: HOSPADM

## 2020-01-20 RX ORDER — ALLOPURINOL 100 MG/1
100 TABLET ORAL DAILY
Status: DISCONTINUED | OUTPATIENT
Start: 2020-01-21 | End: 2020-01-24 | Stop reason: HOSPADM

## 2020-01-20 RX ORDER — POTASSIUM CHLORIDE 750 MG/1
20 TABLET, EXTENDED RELEASE ORAL DAILY
Status: DISCONTINUED | OUTPATIENT
Start: 2020-01-21 | End: 2020-01-24 | Stop reason: HOSPADM

## 2020-01-20 RX ORDER — GABAPENTIN 300 MG/1
600 CAPSULE ORAL EVERY EVENING
Status: DISCONTINUED | OUTPATIENT
Start: 2020-01-20 | End: 2020-01-24 | Stop reason: HOSPADM

## 2020-01-20 RX ORDER — CITALOPRAM HYDROBROMIDE 20 MG/1
20 TABLET ORAL DAILY
Status: DISCONTINUED | OUTPATIENT
Start: 2020-01-21 | End: 2020-01-24 | Stop reason: HOSPADM

## 2020-01-20 RX ORDER — ACETAMINOPHEN 325 MG/1
650 TABLET ORAL EVERY 6 HOURS PRN
Status: DISCONTINUED | OUTPATIENT
Start: 2020-01-20 | End: 2020-01-24 | Stop reason: HOSPADM

## 2020-01-20 RX ORDER — ALBUTEROL SULFATE 90 UG/1
2 AEROSOL, METERED RESPIRATORY (INHALATION) EVERY 4 HOURS PRN
Status: DISCONTINUED | OUTPATIENT
Start: 2020-01-20 | End: 2020-01-21

## 2020-01-20 RX ORDER — ASPIRIN 81 MG/1
81 TABLET, CHEWABLE ORAL DAILY
Status: DISCONTINUED | OUTPATIENT
Start: 2020-01-21 | End: 2020-01-24 | Stop reason: HOSPADM

## 2020-01-20 RX ORDER — IPRATROPIUM BROMIDE AND ALBUTEROL SULFATE 2.5; .5 MG/3ML; MG/3ML
3 SOLUTION RESPIRATORY (INHALATION) EVERY 4 HOURS PRN
Status: DISCONTINUED | OUTPATIENT
Start: 2020-01-20 | End: 2020-01-24 | Stop reason: HOSPADM

## 2020-01-20 RX ADMIN — SODIUM CHLORIDE 500 ML: 9 INJECTION, SOLUTION INTRAVENOUS at 19:06

## 2020-01-20 RX ADMIN — METHYLPREDNISOLONE SODIUM SUCCINATE 125 MG: 125 INJECTION, POWDER, FOR SOLUTION INTRAMUSCULAR; INTRAVENOUS at 19:06

## 2020-01-20 RX ADMIN — IPRATROPIUM BROMIDE AND ALBUTEROL SULFATE 3 ML: .5; 3 SOLUTION RESPIRATORY (INHALATION) at 19:06

## 2020-01-20 ASSESSMENT — ENCOUNTER SYMPTOMS
DIZZINESS: 1
DIFFICULTY URINATING: 0
COLOR CHANGE: 0
FEVER: 0
HEADACHES: 0
LIGHT-HEADEDNESS: 1
EYE REDNESS: 0
ABDOMINAL PAIN: 0
WHEEZING: 1
ARTHRALGIAS: 0
APPETITE CHANGE: 1
CONFUSION: 0
NECK STIFFNESS: 0
CHEST TIGHTNESS: 1
SHORTNESS OF BREATH: 1

## 2020-01-20 ASSESSMENT — ACTIVITIES OF DAILY LIVING (ADL)
COGNITION: 0 - NO COGNITION ISSUES REPORTED
RETIRED_COMMUNICATION: 0-->UNDERSTANDS/COMMUNICATES WITHOUT DIFFICULTY
TOILETING: 2-->ASSISTIVE PERSON
RETIRED_EATING: 0-->INDEPENDENT
NUMBER_OF_TIMES_PATIENT_HAS_FALLEN_WITHIN_LAST_SIX_MONTHS: 1
BATHING: 2-->ASSISTIVE PERSON
DRESS: 2-->ASSISTIVE PERSON
FALL_HISTORY_WITHIN_LAST_SIX_MONTHS: YES
WHICH_OF_THE_ABOVE_FUNCTIONAL_RISKS_HAD_A_RECENT_ONSET_OR_CHANGE?: AMBULATION;TOILETING;BATHING;DRESSING
AMBULATION: 2-->ASSISTIVE PERSON
SWALLOWING: 0-->SWALLOWS FOODS/LIQUIDS WITHOUT DIFFICULTY
TRANSFERRING: 0-->INDEPENDENT

## 2020-01-20 ASSESSMENT — MIFFLIN-ST. JEOR
SCORE: 1922.1
SCORE: 1948.41

## 2020-01-20 NOTE — ED PROVIDER NOTES
[place] EMERGENCY DEPARTMENT ([facility])  January 20, 2020  History     Chief Complaint   Patient presents with     Shortness of Breath     The history is provided by the patient and medical records.     Jim Willingham is a 64 year old male with a past medical history notable for paroxysmal A. fib, nonischemic cardiomyopathy on LVAD (since 2017), chronic kidney disease, COPD, asthma, T2DM, obesity status post gastric bypass who presents to the Emergency Department for evaluation of shortness of breath.     Per patient's chart review, patient was transported on CPAP to here [facility] ED 1/15/2020 for shortness of breath. Of note, patient was positive for influenza B. He was treated on neb with improvements to his symptoms. Patient's CBC was noted for WBC 10.2; RBC 3.98; hemoglobin 11.6; hematocrit 36.8; MCV 93; MCH 29.1; MCHC 31.5; RDW 13.7; platelet count 189.     Here, patient states that he has been having shortness of breath since his last discharge here at [facility] 01/15/2020. Patient reports of having shortness of breath at rest (manageable) that worsens with exertion. He endorses associated chest tightness, wheezing, light headedness, dizziness, and decrease in appetite. He is afebrile. Patient states she hasn't had much to eat since onset of symptoms where he has been eating soup only. He denies any follow up visit since he was last discharged here. Patient states he was placed on 2L of 97% oxygen during the ambulance ride here. Per patient, has had the LVAD in place since 06/17 and is currently on hold to be placed on the transplant list. He endorses of medical history for COPD and asthma.    Past Medical History:   Diagnosis Date     Benign essential hypertension 5/11/2017     Cardiomyopathy, unspecified (H) 5/8/2017     CKD (chronic kidney disease) stage 3, GFR 30-59 ml/min (H) 5/11/2017     Depression 5/11/2017     Diabetes mellitus (H) 5/11/2017     H/O gastric bypass 5/11/2017      ICD (implantable cardioverter-defibrillator), biventricular, in situ 2017     LVAD (left ventricular assist device) present (H)      NICM (nonischemic cardiomyopathy) (H)/ EF 20% 2017    ECHO: LVEDd. 7.66 cm, Restrictive pattern , Severe mitral valve regurgitation     CECILIA (obstructive sleep apnea) 2017     Paroxysmal atrial fibrillation (H) 2017     Paroxysmal VT (H) 2017     Uncomplicated asthma      Vitamin B12 deficiency (non anemic) 2017     Past Surgical History:   Procedure Laterality Date     CV RIGHT HEART CATH N/A 2019    Procedure: CV RIGHT HEART CATH;  Surgeon: Renu Sears MD;  Location:  HEART CARDIAC CATH LAB     GI SURGERY      Syl[facility] en Y     INSERT VENTRICULAR ASSIST DEVICE LEFT (HEARTMATE II) N/A 2017    Procedure: INSERT VENTRICULAR ASSIST DEVICE LEFT (HEARTMATE II);  Median Sternotomy Heartmate II Left Ventricular Assist Device Insertion on Pump Oxygenator;  Surgeon: Ronnie Quigley MD;  Location:  OR     ORTHOPEDIC SURGERY      right knee wired     Family History   Problem Relation Age of Onset     Cerebrovascular Disease Mother      Diabetes Mother      Hypertension Mother      Coronary Artery Disease Father      Diabetes Type 2  Father      Social History     Tobacco Use     Smoking status: Former Smoker     Last attempt to quit:      Years since quittin.0     Smokeless tobacco: Never Used   Substance Use Topics     Alcohol use: No     No current facility-administered medications for this encounter.      Allergies   Allergen Reactions     Beer Shortness Of Breath     Grass Shortness Of Breath     Ace Inhibitors Unknown     Dust Mites Other (See Comments)     Asthma     Mold Other (See Comments)     Asthma     Penicillins      Sulfa Drugs Unknown and Other (See Comments)     PN: unknown     Strawberries [Strawberry] Itching     I have reviewed the Medications, Allergies, Past Medical and Surgical History, and Social History  "in the Epic system.    Review of Systems   Constitutional: Positive for appetite change (Decrease). Negative for fever.   HENT: Negative for congestion.    Eyes: Negative for redness.   Respiratory: Positive for chest tightness, shortness of breath and wheezing.    Cardiovascular: Negative for chest pain.   Gastrointestinal: Negative for abdominal pain.   Genitourinary: Negative for difficulty urinating.   Musculoskeletal: Negative for arthralgias and neck stiffness.   Skin: Negative for color change.   Neurological: Positive for dizziness and light-headedness. Negative for headaches.   Psychiatric/Behavioral: Negative for confusion.   All other systems reviewed and are negative.      Physical Exam   BP: (!) 80/68  Heart Rate: 88  Temp: 97.9  F (36.6  C)  Resp: 18  Height: 172.7 cm (5' 8\")  Weight: 114.8 kg (253 lb)  SpO2: 91 %      Physical Exam  Vitals signs reviewed.   Constitutional:       General: He is not in acute distress.     Appearance: He is not ill-appearing or diaphoretic.   HENT:      Head: Atraumatic.   Eyes:      General: No scleral icterus.     Pupils: Pupils are equal, round, and reactive to light.   Neck:      Musculoskeletal: Normal range of motion. No neck rigidity.      Comments: No JVD  Cardiovascular:      Comments: Whirring noise noted over LVAD  Pulmonary:      Breath sounds: Wheezing present.      Comments: Mild wheezing at bilateral lung bases.  Tachypnea.  Mildly increased work of breathing.  Desaturation with movement.  No accessory muscle use, tripoding, or other signs of respiratory distress.  Chest:      Chest wall: No tenderness.   Abdominal:      General: Bowel sounds are normal.      Palpations: Abdomen is soft.      Tenderness: There is no abdominal tenderness.   Musculoskeletal:         General: No tenderness.      Right lower leg: No edema.      Left lower leg: No edema.   Skin:     General: Skin is warm.      Findings: No rash.   Neurological:      General: No focal deficit " present.      Mental Status: He is alert and oriented to person, place, and time.   Psychiatric:         Mood and Affect: Mood normal.         ED Course   5:45 PM  The patient was seen and examined by Leonel Alonso DO, in Room ED18.      Procedures             EKG Interpretation:      Interpreted by Leonel Alonso DO  Time reviewed: 1809  Symptoms at time of EKG: dyspnea   Rhythm: LVAD   Rate: 176  Axis: normal  Ectopy: none  Conduction: normal  ST Segments/ T Waves: No ST-T wave changes  Q Waves: none  Comparison to prior: Unchanged    Clinical Impression: Unchanged EKG in LVAD patient        Results for orders placed or performed during the hospital encounter of 01/20/20 (from the past 24 hour(s))   EKG 12-lead, tracing only   Result Value Ref Range    Interpretation ECG Click View Image link to view waveform and result    CBC with platelets differential   Result Value Ref Range    WBC 9.7 4.0 - 11.0 10e9/L    RBC Count 4.09 (L) 4.4 - 5.9 10e12/L    Hemoglobin 11.6 (L) 13.3 - 17.7 g/dL    Hematocrit 38.1 (L) 40.0 - 53.0 %    MCV 93 78 - 100 fl    MCH 28.4 26.5 - 33.0 pg    MCHC 30.4 (L) 31.5 - 36.5 g/dL    RDW 14.3 10.0 - 15.0 %    Platelet Count 283 150 - 450 10e9/L    Diff Method Automated Method     % Neutrophils 84.1 %    % Lymphocytes 6.9 %    % Monocytes 7.4 %    % Eosinophils 0.0 %    % Basophils 0.1 %    % Immature Granulocytes 1.5 %    Nucleated RBCs 1 (H) 0 /100    Absolute Neutrophil 8.1 1.6 - 8.3 10e9/L    Absolute Lymphocytes 0.7 (L) 0.8 - 5.3 10e9/L    Absolute Monocytes 0.7 0.0 - 1.3 10e9/L    Absolute Eosinophils 0.0 0.0 - 0.7 10e9/L    Absolute Basophils 0.0 0.0 - 0.2 10e9/L    Abs Immature Granulocytes 0.1 0 - 0.4 10e9/L    Absolute Nucleated RBC 0.1    Basic metabolic panel   Result Value Ref Range    Sodium 134 133 - 144 mmol/L    Potassium 4.0 3.4 - 5.3 mmol/L    Chloride 99 94 - 109 mmol/L    Carbon Dioxide 29 20 - 32 mmol/L    Anion Gap 5 3 - 14 mmol/L    Glucose 273 (H) 70 - 99 mg/dL     Urea Nitrogen 40 (H) 7 - 30 mg/dL    Creatinine 1.18 0.66 - 1.25 mg/dL    GFR Estimate 65 >60 mL/min/[1.73_m2]    GFR Estimate If Black 76 >60 mL/min/[1.73_m2]    Calcium 8.5 8.5 - 10.1 mg/dL   Troponin I   Result Value Ref Range    Troponin I ES 0.044 0.000 - 0.045 ug/L   Lactic acid whole blood   Result Value Ref Range    Lactic Acid 2.1 (H) 0.7 - 2.0 mmol/L   Blood culture   Result Value Ref Range    Specimen Description Blood Unspecified Site     Culture Micro No growth after 1 hour    XR Chest 2 Views    Narrative    XR CHEST 2 VW  1/20/2020 7:00 PM      HISTORY: dyspnea, recent influenza B    COMPARISON: Chest x-ray 1/15/2020.    FINDINGS:   PA and lateral views of the chest. Postsurgical changes of the chest.  Median sternotomy wires are intact. Cardiac defibrillator projecting  over the left hemithorax with leads in stable position. LVAD projects  over the left pericardial space. Stable, enlarged cardiac silhouette.  Trachea is midline. Pulmonary vasculature is prominent with improved  interstitial lung opacities. No pleural effusion or pneumothorax  appreciated. No acute osseous abnormalities. Visualized upper abdomen  is unremarkable.      Impression    IMPRESSION:   Stable, mild cardiomegaly with improved pulmonary vascular congestion.  No focal consolidation appreciated.    I have personally reviewed the examination and initial interpretation  and I agree with the findings.    THOMAS OSULLIVAN MD   Blood culture   Result Value Ref Range    Specimen Description Blood Unspecified Site     Culture Micro PENDING        The Lactic acid level is elevated due to albuterol/dehydration, at this time there is no sign of severe sepsis or septic shock.       Labs Ordered and Resulted from Time of ED Arrival Up to the Time of Departure from the ED   CBC WITH PLATELETS DIFFERENTIAL - Abnormal; Notable for the following components:       Result Value    RBC Count 4.09 (*)     Hemoglobin 11.6 (*)     Hematocrit  38.1 (*)     MCHC 30.4 (*)     Nucleated RBCs 1 (*)     Absolute Lymphocytes 0.7 (*)     All other components within normal limits   BASIC METABOLIC PANEL - Abnormal; Notable for the following components:    Glucose 273 (*)     Urea Nitrogen 40 (*)     All other components within normal limits   LACTIC ACID WHOLE BLOOD - Abnormal; Notable for the following components:    Lactic Acid 2.1 (*)     All other components within normal limits   TROPONIN I   CARDIAC CONTINUOUS MONITORING   PERIPHERAL IV CATHETER   BLOOD CULTURE   BLOOD CULTURE            Assessments & Plan (with Medical Decision Making)   64-year-old male with history of left ventricular assist device, currently on cardiac transplant list presents the emergency department complaint of dyspnea.  Patient was seen in the emergency department on 1/15/2020 was diagnosed with influenza B.  He was recommended admission at this time, but there was no beds available for him so he ultimately was discharged after he stayed in the ED for 18 hours.  Currently on Tamiflu.    Since discharge, he states that he has not gotten any better.  Continues to have diffuse myalgias and body aches.  Primarily, he is concerned about increasing dyspnea.    On arrival, patient is saturating 91% on 2 L nasal cannula.  According to EMS, he was in the mid 80s and will desaturate to low 80s when talking or ambulating.  DuoNeb was given in route.  On exam, he does have some mild wheezing, and tachypnea.  No additional signs of respiratory distress.  Differential includes LVAD failure, ACS, post influenza pneumonia, URI, COPD exacerbation due to Inlfuenza.  Plan for infectious/cardiac work-up.    EKG and chest x-ray unremarkable.  Troponin is negative x1.  Lactic acid is mildly elevated 2.1, this is likely the cause of multiple albuterol treatments as well as possibly some mild dehydration.  Patient overall does not appear septic.  We have drawn blood cultures.  He states that he was called  about a positive blood culture, however, my review, which shown to grow out staph epidermidis, likely contaminant.  Will repeat now.     Patient mildly improved with DuoNeb treatment and IV steroids.  Could be COPD exacerbation from previously diagnosed influenza B.  He does desaturate with ambulation.  Due to this, patient will need to be admitted to the hospital.  Will continue Tamiflu.  Will discuss with the cardiology service.    I have reviewed the nursing notes.    I have reviewed the findings, diagnosis, plan and need for follow up with the patient.    Current Discharge Medication List          Final diagnoses:   Influenza B   COPD exacerbation (H)   History of left ventricular assist device (LVAD) (H)   ILuis, am serving as a trained medical scribe to document services personally performed by Leonel Alonso DO, based on the provider's statements to me.      ILeonel DO, was physically present and have reviewed and verified the accuracy of this note documented by Luis Spain.    1/20/2020   [facility], [place], EMERGENCY DEPARTMENT     Leonel Alonso DO  01/20/20 [id number]

## 2020-01-20 NOTE — ED NOTES
Bed: ED18  Expected date: 1/20/20  Expected time: 3:19 PM  Means of arrival: Ambulance  Comments:  Jim Willingham  MRN: [id number]  Coming in by EMS not feeling better after a dx of influenza B. Pt has LVAD heartmate 3

## 2020-01-21 ENCOUNTER — ANCILLARY PROCEDURE (OUTPATIENT)
Dept: CARDIOLOGY | Facility: CLINIC | Age: 65
DRG: 190 | End: 2020-01-21
Attending: INTERNAL MEDICINE
Payer: COMMERCIAL

## 2020-01-21 LAB
ALBUMIN SERPL-MCNC: 3.2 G/DL (ref 3.4–5)
ALP SERPL-CCNC: 185 U/L (ref 40–150)
ALT SERPL W P-5'-P-CCNC: 91 U/L (ref 0–70)
ANION GAP SERPL CALCULATED.3IONS-SCNC: 10 MMOL/L (ref 3–14)
ANION GAP SERPL CALCULATED.3IONS-SCNC: 11 MMOL/L (ref 3–14)
AST SERPL W P-5'-P-CCNC: 36 U/L (ref 0–45)
BACTERIA SPEC CULT: NO GROWTH
BASE EXCESS BLDV CALC-SCNC: 0.7 MMOL/L
BASOPHILS # BLD AUTO: 0 10E9/L (ref 0–0.2)
BASOPHILS NFR BLD AUTO: 0.2 %
BILIRUB SERPL-MCNC: 0.5 MG/DL (ref 0.2–1.3)
BUN SERPL-MCNC: 45 MG/DL (ref 7–30)
BUN SERPL-MCNC: 45 MG/DL (ref 7–30)
CALCIUM SERPL-MCNC: 8.1 MG/DL (ref 8.5–10.1)
CALCIUM SERPL-MCNC: 8.2 MG/DL (ref 8.5–10.1)
CHLORIDE SERPL-SCNC: 97 MMOL/L (ref 94–109)
CHLORIDE SERPL-SCNC: 97 MMOL/L (ref 94–109)
CO2 SERPL-SCNC: 23 MMOL/L (ref 20–32)
CO2 SERPL-SCNC: 25 MMOL/L (ref 20–32)
CREAT SERPL-MCNC: 1.19 MG/DL (ref 0.66–1.25)
CREAT SERPL-MCNC: 1.28 MG/DL (ref 0.66–1.25)
DIFFERENTIAL METHOD BLD: ABNORMAL
EOSINOPHIL # BLD AUTO: 0 10E9/L (ref 0–0.7)
EOSINOPHIL NFR BLD AUTO: 0 %
ERYTHROCYTE [DISTWIDTH] IN BLOOD BY AUTOMATED COUNT: 14.2 % (ref 10–15)
ERYTHROCYTE [DISTWIDTH] IN BLOOD BY AUTOMATED COUNT: 14.3 % (ref 10–15)
GFR SERPL CREATININE-BSD FRML MDRD: 59 ML/MIN/{1.73_M2}
GFR SERPL CREATININE-BSD FRML MDRD: 65 ML/MIN/{1.73_M2}
GLUCOSE BLDC GLUCOMTR-MCNC: 264 MG/DL (ref 70–99)
GLUCOSE BLDC GLUCOMTR-MCNC: 335 MG/DL (ref 70–99)
GLUCOSE BLDC GLUCOMTR-MCNC: 354 MG/DL (ref 70–99)
GLUCOSE BLDC GLUCOMTR-MCNC: 387 MG/DL (ref 70–99)
GLUCOSE BLDC GLUCOMTR-MCNC: 407 MG/DL (ref 70–99)
GLUCOSE SERPL-MCNC: 399 MG/DL (ref 70–99)
GLUCOSE SERPL-MCNC: 486 MG/DL (ref 70–99)
GRAM STN SPEC: ABNORMAL
HBA1C MFR BLD: 7 % (ref 0–5.6)
HCO3 BLDV-SCNC: 26 MMOL/L (ref 21–28)
HCT VFR BLD AUTO: 37.3 % (ref 40–53)
HCT VFR BLD AUTO: 37.8 % (ref 40–53)
HGB BLD-MCNC: 11.3 G/DL (ref 13.3–17.7)
HGB BLD-MCNC: 11.5 G/DL (ref 13.3–17.7)
IMM GRANULOCYTES # BLD: 0.1 10E9/L (ref 0–0.4)
IMM GRANULOCYTES NFR BLD: 1.3 %
INR PPP: 4.59 (ref 0.86–1.14)
INR PPP: 4.61 (ref 0.86–1.14)
INTERPRETATION ECG - MUSE: NORMAL
INTERPRETATION ECG - MUSE: NORMAL
LACTATE BLD-SCNC: 1.9 MMOL/L (ref 0.7–2)
LDH SERPL L TO P-CCNC: 352 U/L (ref 85–227)
LDH SERPL L TO P-CCNC: 359 U/L (ref 85–227)
LDH SERPL L TO P-CCNC: 387 U/L (ref 85–227)
LYMPHOCYTES # BLD AUTO: 0.4 10E9/L (ref 0.8–5.3)
LYMPHOCYTES NFR BLD AUTO: 4.8 %
Lab: ABNORMAL
Lab: NORMAL
MAGNESIUM SERPL-MCNC: 1.7 MG/DL (ref 1.6–2.3)
MAGNESIUM SERPL-MCNC: 1.8 MG/DL (ref 1.6–2.3)
MCH RBC QN AUTO: 28.3 PG (ref 26.5–33)
MCH RBC QN AUTO: 28.4 PG (ref 26.5–33)
MCHC RBC AUTO-ENTMCNC: 30.3 G/DL (ref 31.5–36.5)
MCHC RBC AUTO-ENTMCNC: 30.4 G/DL (ref 31.5–36.5)
MCV RBC AUTO: 93 FL (ref 78–100)
MCV RBC AUTO: 94 FL (ref 78–100)
MONOCYTES # BLD AUTO: 0.2 10E9/L (ref 0–1.3)
MONOCYTES NFR BLD AUTO: 2.7 %
NEUTROPHILS # BLD AUTO: 8.2 10E9/L (ref 1.6–8.3)
NEUTROPHILS NFR BLD AUTO: 91 %
NRBC # BLD AUTO: 0.1 10*3/UL
NRBC BLD AUTO-RTO: 1 /100
O2/TOTAL GAS SETTING VFR VENT: ABNORMAL %
PCO2 BLDV: 44 MM HG (ref 40–50)
PH BLDV: 7.38 PH (ref 7.32–7.43)
PLATELET # BLD AUTO: 266 10E9/L (ref 150–450)
PLATELET # BLD AUTO: 267 10E9/L (ref 150–450)
PO2 BLDV: 51 MM HG (ref 25–47)
POTASSIUM SERPL-SCNC: 4.9 MMOL/L (ref 3.4–5.3)
POTASSIUM SERPL-SCNC: 5.1 MMOL/L (ref 3.4–5.3)
PROCALCITONIN SERPL-MCNC: 0.48 NG/ML
PROT SERPL-MCNC: 6.8 G/DL (ref 6.8–8.8)
RBC # BLD AUTO: 3.98 10E12/L (ref 4.4–5.9)
RBC # BLD AUTO: 4.06 10E12/L (ref 4.4–5.9)
SODIUM SERPL-SCNC: 131 MMOL/L (ref 133–144)
SODIUM SERPL-SCNC: 131 MMOL/L (ref 133–144)
SPECIMEN SOURCE: ABNORMAL
SPECIMEN SOURCE: NORMAL
TROPONIN I SERPL-MCNC: 0.03 UG/L (ref 0–0.04)
URATE SERPL-MCNC: 9 MG/DL (ref 3.5–7.2)
WBC # BLD AUTO: 7.8 10E9/L (ref 4–11)
WBC # BLD AUTO: 9.1 10E9/L (ref 4–11)

## 2020-01-21 PROCEDURE — 25000131 ZZH RX MED GY IP 250 OP 636 PS 637: Performed by: STUDENT IN AN ORGANIZED HEALTH CARE EDUCATION/TRAINING PROGRAM

## 2020-01-21 PROCEDURE — 93005 ELECTROCARDIOGRAM TRACING: CPT

## 2020-01-21 PROCEDURE — 85610 PROTHROMBIN TIME: CPT | Performed by: INTERNAL MEDICINE

## 2020-01-21 PROCEDURE — 93284 PRGRMG EVAL IMPLANTABLE DFB: CPT

## 2020-01-21 PROCEDURE — 93750 INTERROGATION VAD IN PERSON: CPT

## 2020-01-21 PROCEDURE — 87185 SC STD ENZYME DETCJ PER NZM: CPT | Performed by: STUDENT IN AN ORGANIZED HEALTH CARE EDUCATION/TRAINING PROGRAM

## 2020-01-21 PROCEDURE — 83615 LACTATE (LD) (LDH) ENZYME: CPT | Performed by: INTERNAL MEDICINE

## 2020-01-21 PROCEDURE — 93321 DOPPLER ECHO F-UP/LMTD STD: CPT | Mod: 26 | Performed by: INTERNAL MEDICINE

## 2020-01-21 PROCEDURE — 36415 COLL VENOUS BLD VENIPUNCTURE: CPT | Performed by: STUDENT IN AN ORGANIZED HEALTH CARE EDUCATION/TRAINING PROGRAM

## 2020-01-21 PROCEDURE — 93284 PRGRMG EVAL IMPLANTABLE DFB: CPT | Mod: 26 | Performed by: INTERNAL MEDICINE

## 2020-01-21 PROCEDURE — 84550 ASSAY OF BLOOD/URIC ACID: CPT | Performed by: INTERNAL MEDICINE

## 2020-01-21 PROCEDURE — 40000275 ZZH STATISTIC RCP TIME EA 10 MIN

## 2020-01-21 PROCEDURE — 84145 PROCALCITONIN (PCT): CPT | Performed by: INTERNAL MEDICINE

## 2020-01-21 PROCEDURE — 87205 SMEAR GRAM STAIN: CPT | Performed by: STUDENT IN AN ORGANIZED HEALTH CARE EDUCATION/TRAINING PROGRAM

## 2020-01-21 PROCEDURE — 87086 URINE CULTURE/COLONY COUNT: CPT | Performed by: NURSE PRACTITIONER

## 2020-01-21 PROCEDURE — 83605 ASSAY OF LACTIC ACID: CPT | Performed by: STUDENT IN AN ORGANIZED HEALTH CARE EDUCATION/TRAINING PROGRAM

## 2020-01-21 PROCEDURE — 25000132 ZZH RX MED GY IP 250 OP 250 PS 637: Performed by: STUDENT IN AN ORGANIZED HEALTH CARE EDUCATION/TRAINING PROGRAM

## 2020-01-21 PROCEDURE — 25000128 H RX IP 250 OP 636: Performed by: NURSE PRACTITIONER

## 2020-01-21 PROCEDURE — 84484 ASSAY OF TROPONIN QUANT: CPT | Performed by: INTERNAL MEDICINE

## 2020-01-21 PROCEDURE — 93308 TTE F-UP OR LMTD: CPT | Mod: 26 | Performed by: INTERNAL MEDICINE

## 2020-01-21 PROCEDURE — 80053 COMPREHEN METABOLIC PANEL: CPT | Performed by: INTERNAL MEDICINE

## 2020-01-21 PROCEDURE — 80048 BASIC METABOLIC PNL TOTAL CA: CPT | Performed by: INTERNAL MEDICINE

## 2020-01-21 PROCEDURE — 87070 CULTURE OTHR SPECIMN AEROBIC: CPT | Performed by: STUDENT IN AN ORGANIZED HEALTH CARE EDUCATION/TRAINING PROGRAM

## 2020-01-21 PROCEDURE — 93010 ELECTROCARDIOGRAM REPORT: CPT | Performed by: INTERNAL MEDICINE

## 2020-01-21 PROCEDURE — 12000001 ZZH R&B MED SURG/OB UMMC

## 2020-01-21 PROCEDURE — 99223 1ST HOSP IP/OBS HIGH 75: CPT | Mod: 25 | Performed by: INTERNAL MEDICINE

## 2020-01-21 PROCEDURE — 94640 AIRWAY INHALATION TREATMENT: CPT | Mod: 76

## 2020-01-21 PROCEDURE — 85025 COMPLETE CBC W/AUTO DIFF WBC: CPT | Performed by: INTERNAL MEDICINE

## 2020-01-21 PROCEDURE — 36415 COLL VENOUS BLD VENIPUNCTURE: CPT | Performed by: INTERNAL MEDICINE

## 2020-01-21 PROCEDURE — 87077 CULTURE AEROBIC IDENTIFY: CPT | Performed by: STUDENT IN AN ORGANIZED HEALTH CARE EDUCATION/TRAINING PROGRAM

## 2020-01-21 PROCEDURE — 85027 COMPLETE CBC AUTOMATED: CPT | Performed by: INTERNAL MEDICINE

## 2020-01-21 PROCEDURE — 93325 DOPPLER ECHO COLOR FLOW MAPG: CPT | Mod: 26 | Performed by: INTERNAL MEDICINE

## 2020-01-21 PROCEDURE — 83036 HEMOGLOBIN GLYCOSYLATED A1C: CPT | Performed by: INTERNAL MEDICINE

## 2020-01-21 PROCEDURE — 83735 ASSAY OF MAGNESIUM: CPT | Performed by: INTERNAL MEDICINE

## 2020-01-21 PROCEDURE — 00000146 ZZHCL STATISTIC GLUCOSE BY METER IP

## 2020-01-21 PROCEDURE — 25000128 H RX IP 250 OP 636: Performed by: STUDENT IN AN ORGANIZED HEALTH CARE EDUCATION/TRAINING PROGRAM

## 2020-01-21 PROCEDURE — 93308 TTE F-UP OR LMTD: CPT

## 2020-01-21 PROCEDURE — 82803 BLOOD GASES ANY COMBINATION: CPT | Performed by: STUDENT IN AN ORGANIZED HEALTH CARE EDUCATION/TRAINING PROGRAM

## 2020-01-21 PROCEDURE — 94660 CPAP INITIATION&MGMT: CPT

## 2020-01-21 PROCEDURE — 25000125 ZZHC RX 250: Performed by: NURSE PRACTITIONER

## 2020-01-21 RX ORDER — ACETAMINOPHEN 325 MG/1
650 TABLET ORAL EVERY 4 HOURS PRN
Status: DISCONTINUED | OUTPATIENT
Start: 2020-01-21 | End: 2020-01-21

## 2020-01-21 RX ORDER — BUMETANIDE 0.25 MG/ML
2 INJECTION INTRAMUSCULAR; INTRAVENOUS 2 TIMES DAILY
Status: DISCONTINUED | OUTPATIENT
Start: 2020-01-21 | End: 2020-01-23

## 2020-01-21 RX ORDER — AMOXICILLIN 250 MG
1 CAPSULE ORAL 2 TIMES DAILY PRN
Status: DISCONTINUED | OUTPATIENT
Start: 2020-01-21 | End: 2020-01-24 | Stop reason: HOSPADM

## 2020-01-21 RX ORDER — DOXYCYCLINE 100 MG/1
100 CAPSULE ORAL EVERY 12 HOURS SCHEDULED
Status: DISCONTINUED | OUTPATIENT
Start: 2020-01-21 | End: 2020-01-24 | Stop reason: HOSPADM

## 2020-01-21 RX ORDER — LIDOCAINE 40 MG/G
CREAM TOPICAL
Status: DISCONTINUED | OUTPATIENT
Start: 2020-01-21 | End: 2020-01-24 | Stop reason: HOSPADM

## 2020-01-21 RX ORDER — AMOXICILLIN 250 MG
2 CAPSULE ORAL 2 TIMES DAILY PRN
Status: DISCONTINUED | OUTPATIENT
Start: 2020-01-21 | End: 2020-01-24 | Stop reason: HOSPADM

## 2020-01-21 RX ORDER — BUMETANIDE 0.25 MG/ML
4 INJECTION INTRAMUSCULAR; INTRAVENOUS ONCE
Status: COMPLETED | OUTPATIENT
Start: 2020-01-21 | End: 2020-01-21

## 2020-01-21 RX ORDER — ALUMINA, MAGNESIA, AND SIMETHICONE 2400; 2400; 240 MG/30ML; MG/30ML; MG/30ML
30 SUSPENSION ORAL EVERY 4 HOURS PRN
Status: DISCONTINUED | OUTPATIENT
Start: 2020-01-21 | End: 2020-01-24 | Stop reason: HOSPADM

## 2020-01-21 RX ORDER — LEVALBUTEROL INHALATION SOLUTION 1.25 MG/3ML
1.25 SOLUTION RESPIRATORY (INHALATION) EVERY 8 HOURS
Status: DISCONTINUED | OUTPATIENT
Start: 2020-01-21 | End: 2020-01-22

## 2020-01-21 RX ORDER — POLYETHYLENE GLYCOL 3350 17 G/17G
17 POWDER, FOR SOLUTION ORAL DAILY PRN
Status: DISCONTINUED | OUTPATIENT
Start: 2020-01-21 | End: 2020-01-24 | Stop reason: HOSPADM

## 2020-01-21 RX ORDER — NITROGLYCERIN 0.4 MG/1
0.4 TABLET SUBLINGUAL EVERY 5 MIN PRN
Status: DISCONTINUED | OUTPATIENT
Start: 2020-01-21 | End: 2020-01-24 | Stop reason: HOSPADM

## 2020-01-21 RX ORDER — BUMETANIDE 0.25 MG/ML
2 INJECTION INTRAMUSCULAR; INTRAVENOUS ONCE
Status: DISCONTINUED | OUTPATIENT
Start: 2020-01-21 | End: 2020-01-21

## 2020-01-21 RX ORDER — ACETAMINOPHEN 650 MG/1
650 SUPPOSITORY RECTAL EVERY 4 HOURS PRN
Status: DISCONTINUED | OUTPATIENT
Start: 2020-01-21 | End: 2020-01-24 | Stop reason: HOSPADM

## 2020-01-21 RX ADMIN — SPIRONOLACTONE 25 MG: 25 TABLET ORAL at 01:27

## 2020-01-21 RX ADMIN — METOPROLOL SUCCINATE 50 MG: 50 TABLET, EXTENDED RELEASE ORAL at 10:08

## 2020-01-21 RX ADMIN — BUMETANIDE 2 MG: 1 TABLET ORAL at 10:07

## 2020-01-21 RX ADMIN — BUMETANIDE 2 MG: 0.25 INJECTION INTRAMUSCULAR; INTRAVENOUS at 19:06

## 2020-01-21 RX ADMIN — BUMETANIDE 2 MG: 0.25 INJECTION INTRAMUSCULAR; INTRAVENOUS at 14:30

## 2020-01-21 RX ADMIN — LEVALBUTEROL HYDROCHLORIDE 1.25 MG: 1.25 SOLUTION RESPIRATORY (INHALATION) at 23:37

## 2020-01-21 RX ADMIN — PANTOPRAZOLE SODIUM 40 MG: 40 TABLET, DELAYED RELEASE ORAL at 10:08

## 2020-01-21 RX ADMIN — DOXYCYCLINE 100 MG: 100 CAPSULE ORAL at 21:08

## 2020-01-21 RX ADMIN — TRAMADOL HYDROCHLORIDE 100 MG: 50 TABLET ORAL at 01:33

## 2020-01-21 RX ADMIN — PREDNISONE 40 MG: 20 TABLET ORAL at 10:06

## 2020-01-21 RX ADMIN — MONTELUKAST 10 MG: 10 TABLET, FILM COATED ORAL at 01:29

## 2020-01-21 RX ADMIN — POTASSIUM CHLORIDE 20 MEQ: 750 TABLET, EXTENDED RELEASE ORAL at 10:07

## 2020-01-21 RX ADMIN — Medication 37.5 MG: at 10:06

## 2020-01-21 RX ADMIN — LEVALBUTEROL HYDROCHLORIDE 1.25 MG: 1.25 SOLUTION RESPIRATORY (INHALATION) at 15:43

## 2020-01-21 RX ADMIN — METOPROLOL SUCCINATE 25 MG: 25 TABLET, EXTENDED RELEASE ORAL at 21:09

## 2020-01-21 RX ADMIN — GABAPENTIN 300 MG: 300 CAPSULE ORAL at 13:24

## 2020-01-21 RX ADMIN — ALLOPURINOL 100 MG: 100 TABLET ORAL at 10:08

## 2020-01-21 RX ADMIN — SPIRONOLACTONE 25 MG: 25 TABLET ORAL at 10:07

## 2020-01-21 RX ADMIN — GABAPENTIN 300 MG: 300 CAPSULE ORAL at 10:07

## 2020-01-21 RX ADMIN — MONTELUKAST 10 MG: 10 TABLET, FILM COATED ORAL at 22:09

## 2020-01-21 RX ADMIN — GABAPENTIN 600 MG: 300 CAPSULE ORAL at 21:08

## 2020-01-21 RX ADMIN — SPIRONOLACTONE 25 MG: 25 TABLET ORAL at 21:06

## 2020-01-21 RX ADMIN — DOXYCYCLINE 100 MG: 100 CAPSULE ORAL at 10:06

## 2020-01-21 RX ADMIN — ASPIRIN 81 MG CHEWABLE TABLET 81 MG: 81 TABLET CHEWABLE at 10:06

## 2020-01-21 RX ADMIN — CITALOPRAM HYDROBROMIDE 20 MG: 20 TABLET ORAL at 10:07

## 2020-01-21 RX ADMIN — FLUTICASONE FUROATE AND VILANTEROL TRIFENATATE 1 PUFF: 200; 25 POWDER RESPIRATORY (INHALATION) at 10:05

## 2020-01-21 RX ADMIN — GABAPENTIN 600 MG: 300 CAPSULE ORAL at 01:28

## 2020-01-21 RX ADMIN — BUMETANIDE 4 MG: 0.25 INJECTION INTRAMUSCULAR; INTRAVENOUS at 04:14

## 2020-01-21 RX ADMIN — UMECLIDINIUM 1 PUFF: 62.5 AEROSOL, POWDER ORAL at 10:05

## 2020-01-21 RX ADMIN — METOPROLOL SUCCINATE 25 MG: 25 TABLET, EXTENDED RELEASE ORAL at 01:29

## 2020-01-21 ASSESSMENT — ACTIVITIES OF DAILY LIVING (ADL)
ADLS_ACUITY_SCORE: 18

## 2020-01-21 ASSESSMENT — MIFFLIN-ST. JEOR
SCORE: 1948.86
SCORE: 1932.08

## 2020-01-21 NOTE — PLAN OF CARE
UER admit. Admitted for SOB,positive for Flu B. He has been SOB since he was sent home on the 15th. He has eaten soup since this time. He said had positive BC recently-so ER ricardo new BC. Lactic was 2.1-ER gave 500 NS. He has myalgias and body aches. HX A fib, CM, HM 2 2017 with weekly dressing changes (done today), CKD, COPD, asthma, DM2, obese after gastrc bypass CECILIA-CPAPA. ER gave Duoneb and Solumedrol. 2 L oxygen.   A/here appears in no distressas he sits still. When he moves he starts to araujo and puff. He is in droplet iso, wants food-given, no meds or valuables, but has usual VAD supplies.   P/team to see and write orders as he needs his pm meds and hopes for them soon, monitor for changes, and keep team updated. Label VAD supplies per protocol

## 2020-01-21 NOTE — H&P
Cardiology History and Physical   Jim Willingham MRN: [id number]  Age: 64 year old, : 20    Chief Complaint:  Shortness of breath    HPI:   Jim Willingham is a 64 year old male with PMH notable for NICM (EF 20%) s/p HeartMate II LVAD palcement (17) as destination therapy (obesity), COPD, type 2 DM, afib (on warfarin) who presented to the ED with acute onset of shortness of breath.     He was recently admitted 1/15- for SOB requiring bipap and was found to have influenza B. He was started on tamiflu and prednisone and oxygen saturations improved. He was discharged and instructed to complete 5 days of prednisone and tamiflu, which he did. Today he reports he never really felt better.HHe continues to have fatigue and myalgias. At rest his breathing is mildly labored, but he quickly becomes dyspneic when walking. He states that walking to the kitchen has become a daunting task because of how tired he is and how out of breath it makes him. He also continues to have a cough productive of greyish brown sputum. His sputum is thick, but he is able to cough it up. He has been using q4h duonebs and albuterol inhalers. He has not had much of an appetite and has been eating only vegetable soup from the can. Despite the BID bumex, he reports decreased urine output. No chest pain. No alarms with LVAD.    Review of Systems:    Complete 10 point review of systems was performed and negative except per HPI.      Past Medical History    Reviewed and edited as appropriate  Past Medical History:   Diagnosis Date     Benign essential hypertension 2017     Cardiomyopathy, unspecified (H) 2017     CKD (chronic kidney disease) stage 3, GFR 30-59 ml/min (H) 2017     Depression 2017     Diabetes mellitus (H) 2017     H/O gastric bypass 2017     ICD (implantable cardioverter-defibrillator), biventricular, in situ 2017     LVAD (left ventricular assist device) present (H)       NICM (nonischemic cardiomyopathy) (H)/ EF 20% 2017    ECHO: LVEDd. 7.66 cm, Restrictive pattern , Severe mitral valve regurgitation     CECILIA (obstructive sleep apnea) 2017     Paroxysmal atrial fibrillation (H) 2017     Paroxysmal VT (H) 2017     Uncomplicated asthma      Vitamin B12 deficiency (non anemic) 2017       Past Surgical History   Reviewed and edited as appropriate   Past Surgical History:   Procedure Laterality Date     CV RIGHT HEART CATH N/A 2019    Procedure: CV RIGHT HEART CATH;  Surgeon: Renu Sears MD;  Location:  HEART CARDIAC CATH LAB     GI SURGERY      [facility] en Y     INSERT VENTRICULAR ASSIST DEVICE LEFT (HEARTMATE II) N/A 2017    Procedure: INSERT VENTRICULAR ASSIST DEVICE LEFT (HEARTMATE II);  Median Sternotomy Heartmate II Left Ventricular Assist Device Insertion on Pump Oxygenator;  Surgeon: Ronnie Quigley MD;  Location:  OR     ORTHOPEDIC SURGERY      right knee wired       Social History   Reviewed and edited as appropriate  Social History     Socioeconomic History     Marital status:      Spouse name: Not on file     Number of children: Not on file     Years of education: Not on file     Highest education level: Not on file   Occupational History     Not on file   Social Needs     Financial resource strain: Not on file     Food insecurity:     Worry: Not on file     Inability: Not on file     Transportation needs:     Medical: Not on file     Non-medical: Not on file   Tobacco Use     Smoking status: Former Smoker     Last attempt to quit:      Years since quittin.0     Smokeless tobacco: Never Used   Substance and Sexual Activity     Alcohol use: No     Drug use: No     Sexual activity: Yes     Partners: Female   Lifestyle     Physical activity:     Days per week: Not on file     Minutes per session: Not on file     Stress: Not on file   Relationships     Social connections:     Talks on phone: Not on file     Gets  together: Not on file     Attends Baptism service: Not on file     Active member of club or organization: Not on file     Attends meetings of clubs or organizations: Not on file     Relationship status: Not on file     Intimate partner violence:     Fear of current or ex partner: Not on file     Emotionally abused: Not on file     Physically abused: Not on file     Forced sexual activity: Not on file   Other Topics Concern     Parent/sibling w/ CABG, MI or angioplasty before 65F 55M? No   Social History Narrative     Not on file       Family History     Reviewed and edited as appropriate  Family History   Problem Relation Age of Onset     Cerebrovascular Disease Mother      Diabetes Mother      Hypertension Mother      Coronary Artery Disease Father      Diabetes Type 2  Father        Allergies   Reviewed and edited as appropriate     Allergies   Allergen Reactions     Beer Shortness Of Breath     Grass Shortness Of Breath     Ace Inhibitors Unknown     Dust Mites Other (See Comments)     Asthma     Mold Other (See Comments)     Asthma     Penicillins      Sulfa Drugs Unknown and Other (See Comments)     PN: unknown     Strawberries [Strawberry] Itching        Prior to Admission Medications    Medications Prior to Admission   Medication Sig Dispense Refill Last Dose     warfarin (COUMADIN) 5 MG tablet Take 5 - 7.5mg daily or as directed by coumadin clinic. 90 tablet 5 1/19/2020 at Unknown time     acetaminophen (TYLENOL) 325 MG tablet Take 650 mg by mouth every 6 hours as needed for mild pain   Past Week at [facility]     albuterol (ALBUTEROL) 108 (90 BASE) MCG/ACT Inhaler Inhale 2 puffs into the lungs every 4 hours as needed for shortness of breath / dyspnea or wheezing   1/15/2020 at [facility]     allopurinol (ZYLOPRIM) 100 MG tablet Take 1 tablet (100 mg) by mouth daily 60 tablet 5 1/15/2020 at AM     aspirin 81 MG chewable tablet 1 tablet (81 mg) by Oral or Feeding Tube route daily 36 tablet  1/15/2020 at AM     bumetanide  (BUMEX) 1 MG tablet Take 2 tablets (2 mg) by mouth 2 times daily 120 tablet 11 1/15/2020 at PM     citalopram (CELEXA) 20 MG tablet Take 20 mg by mouth daily   1/15/2020 at AM     cyanocobalamin (VITAMIN B12) 1000 MCG/ML injection Inject 1,000 mcg into the muscle every 30 days   2019 at AM     fluticasone-vilanterol (BREO ELLIPTA) 200-25 MCG/INH oral inhaler Inhale 1 puff into the lungs daily   1/15/2020 at AM     gabapentin (NEURONTIN) 300 MG capsule Take 1 capsule (300 mg) by mouth twice daily and 2 capsules (600 mg) by mouth at bedtime daily.   1/15/2020 at PM     ipratropium - albuterol 0.5 mg/2.5 mg/3 mL (DUONEB) 0.5-2.5 (3) MG/3ML neb solution Take 3 mLs by nebulization every 4 hours as needed for shortness of breath / dyspnea or wheezing   1/15/2020 at [facility]     losartan (COZAAR) 25 MG tablet Take 1.5 tablets (37.5 mg) by mouth daily 135 tablet 3 1/15/2020 at AM     metFORMIN (GLUCOPHAGE) 500 MG tablet Take 1 tablet (500 mg) by mouth 2 times daily (with meals) 60 tablet 0 1/15/2020 at PM     metoprolol succinate ER (TOPROL-XL) 25 MG 24 hr tablet Take 50mg in the morning and 25mg in the evening 270 tablet 2 1/15/2020 at PM     montelukast (SINGULAIR) 10 MG tablet Take 10 mg by mouth At Bedtime   1/15/2020 at [facility]     [] oseltamivir (TAMIFLU) 75 MG capsule Take 1 capsule (75 mg) by mouth 2 times daily for 4 days 8 capsule 0      pantoprazole (PROTONIX) 40 MG EC tablet Take 1 tablet (40 mg) by mouth every morning 60 tablet 5 1/15/2020 at AM     potassium chloride (K-TAB,KLOR-CON) 10 MEQ tablet Take 2 tablets (20 mEq) by mouth daily 120 tablet 5 1/15/2020 at AM     predniSONE (DELTASONE) 20 MG tablet Take 2 tablets (40 mg) by mouth daily 8 tablet 0      spironolactone (ALDACTONE) 25 MG tablet Take 25 mg by mouth 2 times daily   1/15/2020 at PM     traMADol (ULTRAM) 50 MG tablet Take 2 tablets (100 mg) by mouth every 6 hours as needed for moderate pain or breakthrough pain 28 tablet  1/15/2020 at [facility]      "umeclidinium (INCRUSE ELLIPTA) 62.5 MCG/INH oral inhaler Inhale 1 puff into the lungs daily   1/15/2020 at AM     zinc sulfate (ZINCATE) 220 (50 ZN) MG capsule Take 220 mg by mouth 2 times daily   1/15/2020 at PM          Physical Exam   BP (!) 113/90 (BP Location: Left arm)   Pulse 67   Temp 97.6  F (36.4  C) (Oral)   Resp 20   Ht 1.727 m (5' 8\")   Wt 117.4 kg (258 lb 12.8 oz)   SpO2 98%   BMI 39.35 kg/m      Intake/Output Summary (Last 24 hours) at 1/21/2020 0142  Last data filed at 1/21/2020 0100  Gross per 24 hour   Intake 100 ml   Output 420 ml   Net -320 ml     Wt:   Wt Readings from Last 2 Encounters:   01/20/20 117.4 kg (258 lb 12.8 oz)   01/15/20 113.9 kg (251 lb)      GEN: pleasant, no acute distress  HEENT: no icterus, MMM, NCAT  CV: LVAD hum. JVP not visible at 60 degrees or sitting up  CHEST: Very distant breath sounds, but faint crackles at bases, no wheezing, good air movement throughout all lung fields  ABD: soft, non-tender  EXTR:warm, well perfused, 1+ pitting edema bilaterally of lower extremities  NEURO: alert oriented, speech fluent/appropriate    Data   Labs and imaging below were independently reviewed and interpreted    BMP  Recent Labs   Lab 01/21/20 0011 01/20/20 1759 01/16/20  0549 01/15/20  2208   * 134 132* 132*   POTASSIUM 5.1 4.0 3.9 3.0*   CHLORIDE 97 99 97 99   QAMAR 8.1* 8.5 7.8* 7.7*   CO2 23 29 26 28   BUN 45* 40* 26 21   CR 1.28* 1.18 1.16 1.00   * 273* 270* 138*     CBC  Recent Labs   Lab 01/21/20  0011 01/20/20  1759 01/16/20  0549 01/15/20  2208   WBC 9.1 9.7 12.5* 10.2   RBC 3.98* 4.09* 4.10* 3.98*   HGB 11.3* 11.6* 11.9* 11.6*   HCT 37.3* 38.1* 37.9* 36.8*   MCV 94 93 92 93   MCH 28.4 28.4 29.0 29.1   MCHC 30.3* 30.4* 31.4* 31.5   RDW 14.3 14.3 13.9 13.7    283 197 189     INR  Recent Labs   Lab 01/21/20  0011 01/16/20  0549 01/15/20  2208   INR 4.59* 1.98* 2.10*     LFTs  Recent Labs   Lab 01/21/20  0011 01/16/20  0549 01/15/20  2208   ALKPHOS " 185* 87 84   AST 36 35 38   ALT 91* 47 46   BILITOTAL 0.5 1.0 0.9   PROTTOTAL 6.8 6.6* 6.3*   ALBUMIN 3.2* 3.4 3.3*      Troponin  Lab Results   Component Value Date    TROPI 0.025 01/21/2020    TROPI 0.044 01/20/2020    TROPI 0.017 02/12/2019         Imaging:  CXR: 1/20/20  Stable, mild cardiomegaly with improved pulmonary vascular congestion.  No focal consolidation appreciated.    EKG:      Transthoracic echocardiogram:3/19/19   Interpretation Summary  HM2 LVAD at 9400 RPM.  Severely dilated LV with severely reduced global LV function, LVEF<20%.  LVIDd=6.1 cm.  Ufgilq-cf-ncwfhybqps dilated RV with moderately reduced global RV function.  The ventricular septum is midline. The aortic valve opens minimally with each  beat. There is mild AI.  LVAD inflow cannula is visualized in the LV apex. LVAD outflow graft is  visualized in the aorta. Normal Doppler interrogation of the LVAD inflow  cannula and outflow graft.     Right Heart Cath: 7/24/19    High normal right sided filling pressures with moderately reduced cardiac index suggestive of RV failure    Normal left sided filling pressure    High RA to PCWP suggestive of RV dysfunction    High normal PA pressure. No evidence of Pulmonary HYpertension     ICD device interrogation 1/8/20  Patient has a Medtronic multi lead ICD. Normal ICD function. 2 NSVT episodes recorded on 1/7/2020 @ 1525 and 1651 - 1 sec, 240-261 bpm. 1 AT/AF episode recorded - 6 seconds. Patient is taking Warfarin. Presenting EGM = AS- @ 83 bpm. AP = 3.3%.  = 95.2%. BVP = 90.8%. VSR pace = 4.5%. OptiVol fluid index is elevated above baseline. Patient denies signs/symptoms of fluid retention. Estimated battery longevity to MARVA = 2.2 years. No short v-v intervals recorded. Lead trends appear stable. Rajani Abdi, LVAD RN and Dr. Montgomery notified of above via epic marifer. NINO Owen RN      Assessment and Recommendation:  64 year old male with PMH significant for NICM s/p LVAD placement in 2017,  paroxysmal a.fib, asthma and COPD presenting with ongoing dyspnea following recent completion of therapy for influenza B     # Acute respiratory distress  # Influenza B  # COPD  Reports ongoing shortness of breath after discharge. S/p oseltamivir x 5 days, prednisone x 5 days without symptomatic improvement. However, patient was on bipap on arrival to ED last admission and is only on 2L NC today and satting in upper 90s. SOB may be from volume overload since weight is up from discharge, crackles on lung exam, and poor diet compliance (canned soup likely has high sodium content). But given his severe underlying lung disease (FEV1/FVC-pre 48%) he likely requires longer to recover from the flu vs. COPD exacerbation (but no wheezing on exam). CXR without infiltrate concerning for pneumonia.  - pro-calcitonin  - prednisone 40mg qday x 5 days  - Doxycycline x 5 days for anti-inflammatory effect  - PTA albuterol inh  - PTA fluticasone-vilanterol inhaler  - PTA singulair 10mg at bedtime  - PTA Incruse ellipta inhaler  - duoneb q4h  - VBG  - sputum culture  - Follow up on blood culture done in ED  - diuresis as below     # Nonischemic cardiomyopathy s/p LVAD (2017)  NYHA Class II, Stage D. EF <20%. HeartMate II. Stable on LVAD flow 9400. LDH stable this admission. ICD recently interrogated with one 6 sec AT/AF episode and two NSVT episodes lasting 1 second. Appears hypervolemic on exam and weight up 253-->258 from discharge. Only eating canned soup so likely has had increased sodium intake which may be contributing. Elevated Cr, trop, and LA may all be from volume overload.  -- Fluid status: Hypervolemic  -- Diuretic: Bumex 2mg BID, 4mg IV bumex x 1  -- ACEi/ARB: Continue losartan 37.5mg daily  -- BB: Continue metoprolol succinate 50/25 mg daily  -- Aldosterone antagonist: spironolactone 25mg daily  -- Anticoagulation: warfarin (INR 2-3)- pharmacy consult to dose given recent fluctuations. Currently held as INR is  supratherapeutic  -- Antiplatelet: Continue ASA-81mg  -- Statin: Not on statin therapy  -- SCD prophylaxis: Medtronic multi-lead ICD in place. Interrogate device  -- Monitor on telemetry  -- Strict I/Os and Daily weights  -- LVAD orderset- speed 9400  -- echo ordered for am  -- trend trop to peak  -- recheck LA in am     # Paroxsymal a.fib  AC with warafrin. Has recently had a labile INR. Reports most recent dosing is 7.5mg daily. INR is currently supratherapeutic likely from diet variation. Minimal a.fib burden on device interrogation recently.  - PTA metoprolol succinate 50mg qam, 25mg qpm  - warfarin- pharmacy to dose consult placed, appreciate assistance. Currently held given supratherapeutic INR  - Daily INR     # Pseudohyponatremia  Appears hypervolemic on exam. Na corrects to 137 based on hyperglycemia.  - control blood sugars     # Anemia  Baseline hgb appears to be around 11. Currently at baseline without any evidence of bleeding.  - continue to monitor     # Chronic kidney disease  Baseline Cr appears to be 1.3-1.4. Currently Cr below baseline, however it is elevated from discharge. Given hypervolemic exam and elevated weight, I suspect this is cardiorenal and should improve with diuresis.  - Avoid nephrotoxic agents  - Continue to monitor with daily creatinine  - Strict I/O  - Daily weights  - 4mg bumetanide x 1 tonight.     # T2DM  Currently hyperglycemic.   - Med sliding scale insulin  - hypoglycemia protocol  - finger sticks QID  - hold PTA metformin given WALTER  - gabapentin 300mg TID     # Depression  - Citalopram 20mg daily     # GERD  - PTA pantoprazole    # Gout  Elevated UA but not reporting symptoms. Baseline appears to be 9-10  - continue PTA allopurinol     # Obesity s/p gastric bypass  - PTA zinc supplement  - PTA Pantoprazole     Diet: 2g Na diet, 2L fluid restriction  DVT: Warfarin   Code: Full    Patient will be formally staffed in the morning.    Tanvi Polk MD  Internal Medicine  PGY2  Pager [phone]        I have reviewed pt's vital signs, notes, medications, labs and imaging. I have also seen and examined the patient.  Please see [facility] shared care progress note dated 1/21/2020 for updated findings and plan.    Rose Conte MD  Section Head - Advanced Heart Failure, Transplantation and Mechanical Circulatory Support  Director - [facility]  Associate Professor of Medicine, [facility]

## 2020-01-21 NOTE — ED NOTES
[facility], [place]   ED Nurse to Floor Handoff     Jim Willingham is a 64 year old male who speaks English and lives with a spouse,  in a home  They arrived in the ED by ambulance from home    ED Chief Complaint: Shortness of Breath    ED Dx;   Final diagnoses:   Influenza B   COPD exacerbation (H)   History of left ventricular assist device (LVAD) (H)         Needed?: No    Allergies:   Allergies   Allergen Reactions     Beer Shortness Of Breath     Grass Shortness Of Breath     Ace Inhibitors Unknown     Dust Mites Other (See Comments)     Asthma     Mold Other (See Comments)     Asthma     Penicillins      Sulfa Drugs Unknown and Other (See Comments)     PN: unknown     Strawberries [Strawberry] Itching   .  Past Medical Hx:   Past Medical History:   Diagnosis Date     Benign essential hypertension 5/11/2017     Cardiomyopathy, unspecified (H) 5/8/2017     CKD (chronic kidney disease) stage 3, GFR 30-59 ml/min (H) 5/11/2017     Depression 5/11/2017     Diabetes mellitus (H) 5/11/2017     H/O gastric bypass 5/11/2017     ICD (implantable cardioverter-defibrillator), biventricular, in situ 5/11/2017     LVAD (left ventricular assist device) present (H)      NICM (nonischemic cardiomyopathy) (H)/ EF 20% 5/11/2017    ECHO: LVEDd. 7.66 cm, Restrictive pattern , Severe mitral valve regurgitation     CECILIA (obstructive sleep apnea) 5/11/2017     Paroxysmal atrial fibrillation (H) 5/11/2017     Paroxysmal VT (H) 5/11/2017     Uncomplicated asthma      Vitamin B12 deficiency (non anemic) 5/11/2017      Baseline Mental status: WDL  Current Mental Status changes: at basesline    Infection present or suspected this encounter: cultures pending  Sepsis suspected: No  Isolation type: No active isolations     Activity level - Baseline/Home:  Independent  Activity Level - Current:   Stand with Assist    Bariatric equipment needed?: No    In the ED these meds were given:   Medications  "  ipratropium - albuterol 0.5 mg/2.5 mg/3 mL (DUONEB) neb solution 3 mL (3 mLs Nebulization Given 1/20/20 1906)   methylPREDNISolone sodium succinate (solu-MEDROL) injection 125 mg (125 mg Intravenous Given 1/20/20 1906)   0.9% sodium chloride BOLUS (500 mLs Intravenous New Bag 1/20/20 1906)       Drips running?  No    Home pump  No    Current LDAs  Peripheral IV 01/20/20 Left Upper forearm (Active)   Number of days: 0       Incision/Surgical Site 06/19/17 Chest (Active)   Number of days: 945       Incision/Surgical Site 07/01/17 Sternum (Active)   Number of days: 933       Incision/Surgical Site 07/01/17 (Active)   Number of days: 933       Incision/Surgical Site 07/01/17 Right Abdomen (Active)   Number of days: 933       Labs results:   Labs Ordered and Resulted from Time of ED Arrival Up to the Time of Departure from the ED   CBC WITH PLATELETS DIFFERENTIAL - Abnormal; Notable for the following components:       Result Value    RBC Count 4.09 (*)     Hemoglobin 11.6 (*)     Hematocrit 38.1 (*)     MCHC 30.4 (*)     Nucleated RBCs 1 (*)     Absolute Lymphocytes 0.7 (*)     All other components within normal limits   BASIC METABOLIC PANEL - Abnormal; Notable for the following components:    Glucose 273 (*)     Urea Nitrogen 40 (*)     All other components within normal limits   LACTIC ACID WHOLE BLOOD - Abnormal; Notable for the following components:    Lactic Acid 2.1 (*)     All other components within normal limits   TROPONIN I   CARDIAC CONTINUOUS MONITORING   PERIPHERAL IV CATHETER   BLOOD CULTURE   BLOOD CULTURE       Imaging Studies:   Recent Results (from the past 24 hour(s))   XR Chest 2 Views    Impression    IMPRESSION:   Stable, mild cardiomegaly with improved pulmonary vascular congestion.  No focal consolidation appreciated.       Recent vital signs:   BP (!) 73/56   Temp 97.9  F (36.6  C) (Oral)   Resp 18   Ht 1.727 m (5' 8\")   Wt 114.8 kg (253 lb)   SpO2 96%   BMI 38.47 kg/m          "     Cardiac Rhythm: A fib  Pt needs tele? Yes  Skin/wound Issues: None    Code Status: Full Code    Pain control: pt had none    Nausea control: pt had none    Abnormal labs/tests/findings requiring intervention: LA 2.1    Family present during ED course? Yes   Family Comments/Social Situation comments: wife at bedside    Tasks needing completion: None    Ender Thompson, RN  [phone] [facility]

## 2020-01-21 NOTE — PHARMACY-ANTICOAGULATION SERVICE
Clinical Pharmacy - Warfarin Dosing Consult     Pharmacy has been consulted to manage this patient s warfarin therapy.  Indication: LVAD/RVAD  Therapy Goal: INR 2-3  Warfarin Prior to Admission: Yes  Warfarin PTA Regimen: 7.5mg daily (Last dose 1-19)  Significant drug interactions: ASA,allopurinol,prednisone,tramadol    INR   Date Value Ref Range Status   01/21/2020 4.59 (H) 0.86 - 1.14 Final   01/16/2020 1.98 (H) 0.86 - 1.14 Final       Hold warfarin for 1-20-20 due to elevated INR.  Pharmacy will monitor Jim Willingham daily and order warfarin doses to achieve specified goal.      Please contact pharmacy as soon as possible if the warfarin needs to be held for a procedure or if the warfarin goals change.

## 2020-01-21 NOTE — PLAN OF CARE
D/MD's were called again to see him.  I/evening meds given with ultram.   D/He says he has numbness and tingling intermittent in both hands and usually no tingling in feet, but some numbness.   I/paged RT so they will bring CPAP.  D/he refused full skin exam as he is too RESENDIZ with any movement.  P/monitor for changes  I/coumadin held per pharmacy as INR is up-informed him  A/rhythm is WAP, BS elevated post ER Solumedrol  0500 after being on cpap and sitting upright he is starting to look better. He admits he feels better and not huffing and puffing as much. I agree

## 2020-01-21 NOTE — PHARMACY-ADMISSION MEDICATION HISTORY
Admission medication history interview status for the 1/20/2020 admission is complete. See Epic admission navigator for allergy information, pharmacy, prior to admission medications and immunization status.     Medication history interview sources:  Recent hospital discharge 1/17, [employer], patient interview    Changes made to PTA medication list (reason)  Added: None  Deleted: Oseltamavir, prednisone (therapy completed)  Changed: None    Additional medication history information (including reliability of information, actions taken by pharmacist):  -Patient's last dose of warfarin was 7.5 mg (PTA regimen 7.5 mg daily) on 1/19/20 (evening).  -Completed 4 days Tamiflu and prednisone since discharge on 1/17.    Prior to Admission medications    Medication Sig Last Dose Taking? Auth Provider   acetaminophen (TYLENOL) 325 MG tablet Take 650 mg by mouth every 6 hours as needed for mild pain  Yes Unknown, Entered By History   albuterol (ALBUTEROL) 108 (90 BASE) MCG/ACT Inhaler Inhale 2 puffs into the lungs every 4 hours as needed for shortness of breath / dyspnea or wheezing  Yes Reported, Patient   allopurinol (ZYLOPRIM) 100 MG tablet Take 1 tablet (100 mg) by mouth daily  Yes Delsia Montgomery MD   aspirin 81 MG chewable tablet 1 tablet (81 mg) by Oral or Feeding Tube route daily  Yes Madina Laguna, PA   bumetanide (BUMEX) 1 MG tablet Take 2 tablets (2 mg) by mouth 2 times daily  Yes Cate Marshall, NP   citalopram (CELEXA) 20 MG tablet Take 20 mg by mouth daily  Yes Reported, Patient   cyanocobalamin (VITAMIN B12) 1000 MCG/ML injection Inject 1,000 mcg into the muscle every 30 days  Yes Reported, Patient   fluticasone-vilanterol (BREO ELLIPTA) 200-25 MCG/INH oral inhaler Inhale 1 puff into the lungs daily  Yes Reported, Patient   gabapentin (NEURONTIN) 300 MG capsule Take 1 capsule (300 mg) by mouth twice daily and 2 capsules (600 mg) by mouth at bedtime daily.  Yes Unknown, Entered By History    ipratropium - albuterol 0.5 mg/2.5 mg/3 mL (DUONEB) 0.5-2.5 (3) MG/3ML neb solution Take 3 mLs by nebulization every 4 hours as needed for shortness of breath / dyspnea or wheezing  Yes Reported, Patient   losartan (COZAAR) 25 MG tablet Take 1.5 tablets (37.5 mg) by mouth daily  Yes Shelia Means NP   metFORMIN (GLUCOPHAGE) 500 MG tablet Take 1 tablet (500 mg) by mouth 2 times daily (with meals)  Yes Shelia Means NP   metoprolol succinate ER (TOPROL-XL) 25 MG 24 hr tablet Take 50mg in the morning and 25mg in the evening  Yes Delisa Montgomery MD   montelukast (SINGULAIR) 10 MG tablet Take 10 mg by mouth At Bedtime  Yes Reported, Patient   pantoprazole (PROTONIX) 40 MG EC tablet Take 1 tablet (40 mg) by mouth every morning  Yes Shelia Means NP   potassium chloride (K-TAB,KLOR-CON) 10 MEQ tablet Take 2 tablets (20 mEq) by mouth daily  Yes Delisa Montgomery MD   traMADol (ULTRAM) 50 MG tablet Take 2 tablets (100 mg) by mouth every 6 hours as needed for moderate pain or breakthrough pain  Yes Nash Amado PA   umeclidinium (INCRUSE ELLIPTA) 62.5 MCG/INH oral inhaler Inhale 1 puff into the lungs daily  Yes Reported, Patient   warfarin (COUMADIN) 5 MG tablet Take 5 - 7.5mg daily or as directed by coumadin clinic. 1/19/2020 at [facility] Yes Delisa Montgomery MD   zinc sulfate (ZINCATE) 220 (50 ZN) MG capsule Take 220 mg by mouth 2 times daily  Yes Reported, Patient   spironolactone (ALDACTONE) 25 MG tablet Take 25 mg by mouth 2 times daily   Unknown, Entered By History     Medication history completed by:   Anastasia Gonzales, PharmD  PGY-1 Pharmacy Resident

## 2020-01-21 NOTE — PROGRESS NOTES
Indication of Interrogation:  Other    Type of VAD:  Heartmate 2    Current Parameters:  Flow= 4.4 lpm, Speed= 9400 rpm, Power= 5.4 carl[place], PI (if applicable)= 5.6    Abnormal Alarm on History:  No    Abnormal Events/Parameters Notes:  Yes, explain: somewhat frequent PI events, PI range 4-6.8. Speed changes occasionally to 9390.     Changes Made during Interrogation:  No    D: Stopped by to see patient. No VAD related questions or concerns at this time.  I: Discussed POC and provided support and listened to patient and care giver's thoughts and concerns.  P: Continue to follow patient and address any questions or concerns patient and or caregiver may have.

## 2020-01-21 NOTE — PROGRESS NOTES
[facility]   Cardiology II Service / Advanced Heart Failure  Daily Progress Note  Date of Service: 1/21/2020      Patient: Jim Willingham  MRN: [id number]  Admission Date: 1/20/2020  Hospital Day # 1    Assessment and Plan: Mr. Willingham is a 64 year old male with a past medical history including CKD Stage III, DM Type II, CECILIA, HTN, COPD, Asthma, and NICM with EF 20% s/p LVAD HM II 6/16/17 as DT due to obesity. His postoperative course was complicated by arrhythmias, WALTER, and left small pleural effusions    Influenza B with COPD Exacerbation. Positive 1/15/20. Completed course of Tamiflu. Chest X-ray negative for consolidation.  - Doxycycline 100 mg po BID for antiinflammatory effect.   - Prednisone 40 mg po daily.   -  Xopenox nebs TID for 3 days.   - Bumex 2 mg IV BID today.   - Continue prior to admission inhalers and Singulair.   - Blood cultures NTD.   - Repeat UC given prior positive UC 1/15 with no acute symptoms.     Chronic systolic heart failure secondary to NICM s/p LVAD HM II. Implanted 6/16/17.   Stage D, NYHA Class III  ACEi/ARB Losartan 37.5 mg po daily   BB Toprol XL 25 mg po BID   Aldosterone antagonist Aldactone 25 mg po BID   SCD prophylaxis CRT-D  Fluid status: Hypervolemic. Continue Bumex 2 mg IV BID today. Echo with dilated IVC and increased tachycardia.   Sleep Apnea Evaluation: CPAP  MAP:   LDH: 359  Anticoagulation: Coumadin per AC. INR-4.61. Goal INR 2-3.  Antiplatelet: ASA 81 mg po daily     The patient's HeartMate LVAD was interrogated 1/21/2020  * Speed 9400 rpm   * Pulsatility index 4.7   * Power 5.2 Manuel   * Flow 4.2 L/minute   Fluid status: hypervolemic   Alarms were reviewed, and negative for acute events.   The driveline exit site was covered with dressing clean, dry, and intact.   All external components were inspected and showed no evidence of damage or malfunction.    Paroxysmal Afib JERMAINE today.   - Baseline HR 88 with current rate 120-130. Diuresis with Bumex.  "  - Continue Toprol XL 50 mg in AM and 25 mg at HS.   - Coumadin as above.     DM Type II, Uncontrolled. Elevated in setting of steroids. Metformin on hold inpatient.   - sliding scale insulin increased to high intensity. If no improvement will add carb coverage.      CKD Stage III.   - Cr 1.19.    HTN  - MAP-. Monitor with diuresis.     Chronic Medical Conditions:  Depression. Continue Celexa.   GERD. Continue Protonix.   Gout. Continue Allopurinol.   Obesity s/p Gastric Bypass with Chronic anemia.     FEN: 2 gram NA diet   PROPHY:  Coumadin. Protonix.   LINES: PIV  DISPO: Anticipate discharge to home in 1-2 days  CODE STATUS:   Full Code   ================================================================    Interval History/ROS: He complains of RESENDIZ, but notes it is improving. He notes chills and myalgias, but denies fever. He denies chest pain and does not feel heart racing. He denies nausea, vomiting, diarrhea, melena, hematochezia, hematemesis, and LE edema. He is tolerating oral intake and ambulation.     Last 24 hr care team notes reviewed.   ROS:  4 point ROS including Respiratory, CV, GI and , other than that noted in the HPI, is negative.     Medications: Reviewed in EPIC.     Physical Exam:   BP 99/57 (BP Location: Right arm)   Pulse 67   Temp 97.9  F (36.6  C) (Oral)   Resp 18   Ht 1.727 m (5' 8\")   Wt 117.4 kg (258 lb 14.4 oz)   SpO2 97%   BMI 39.37 kg/m    GENERAL: Appears alert and oriented times three.   HEENT: Eye symmetrical and free of discharge bilaterally. Mucous membranes moist and without lesions.  NECK: Supple and without lymphadenopathy. JVD 10 cm   CV: RRR, S1S2 present with LVAD hum.   RESPIRATORY: Respirations regular, even, and unlabored. Decreased lung sounds throughout.   GI: Soft and non distended with normoactive bowel sounds present in all quadrants. No tenderness, rebound, guarding. No organomegaly.   EXTREMITIES: +1 bilateral peripheral edema. 2+ bilateral pedal " pulses.   NEUROLOGIC: Alert and orientated x 3. CN II-XII grossly intact. No focal deficits.   MUSCULOSKELETAL: No joint swelling or tenderness.   SKIN: No jaundice. No rashes or lesions. LVAD drive line site CDI.     Data:  CMP  Recent Labs   Lab 01/21/20  0639 01/21/20  0011 01/20/20 1759 01/16/20  0549 01/15/20  2208   * 131* 134 132* 132*   POTASSIUM 4.9 5.1 4.0 3.9 3.0*   CHLORIDE 97 97 99 97 99   CO2 25 23 29 26 28   ANIONGAP 10 11 5 8 6   * 486* 273* 270* 138*   BUN 45* 45* 40* 26 21   CR 1.19 1.28* 1.18 1.16 1.00   GFRESTIMATED 65 59* 65 67 80   GFRESTBLACK 75 69 76 77 >90   QAMAR 8.2* 8.1* 8.5 7.8* 7.7*   MAG 1.8 1.7  --  1.5*  --    PROTTOTAL  --  6.8  --  6.6* 6.3*   ALBUMIN  --  3.2*  --  3.4 3.3*   BILITOTAL  --  0.5  --  1.0 0.9   ALKPHOS  --  185*  --  87 84   AST  --  36  --  35 38   ALT  --  91*  --  47 46     CBC  Recent Labs   Lab 01/21/20  0639 01/21/20 0011 01/20/20 1759 01/16/20  0549   WBC 7.8 9.1 9.7 12.5*   RBC 4.06* 3.98* 4.09* 4.10*   HGB 11.5* 11.3* 11.6* 11.9*   HCT 37.8* 37.3* 38.1* 37.9*   MCV 93 94 93 92   MCH 28.3 28.4 28.4 29.0   MCHC 30.4* 30.3* 30.4* 31.4*   RDW 14.2 14.3 14.3 13.9    266 283 197     INR  Recent Labs   Lab 01/21/20  0639 01/21/20  0011 01/16/20  0549 01/15/20  2208   INR 4.61* 4.59* 1.98* 2.10*       Patient discussed with Dr. Conte.      Soraya CHIU  1/21/2020      I have seen and examined the patient as part of a shared visit with Soraya Marshall NP.  I agree with the note above. I reviewed today's vital signs and medications. I have reviewed and discussed with the advanced practice provider their physical examination, assessment, and plan   Briefly, influenza and COPD exacerbation in  LVAD supported patient  My key history/exam findings are: hemodynamically stable.  Mildly hypervolemic on exam this afternoon.  The key management decisions made by me: increase lasix dose to enhance diuresis.  Continue doxycyline, steroids and  inhaled/repsiratory therapies outlined above.    Rose Conte MD  Section Head - Advanced Heart Failure, Transplantation and Mechanical Circulatory Support  Director - [facility]  Associate Professor of Medicine, [facility]

## 2020-01-22 LAB
ALBUMIN SERPL-MCNC: 3 G/DL (ref 3.4–5)
ALP SERPL-CCNC: 165 U/L (ref 40–150)
ALT SERPL W P-5'-P-CCNC: 65 U/L (ref 0–70)
ANION GAP SERPL CALCULATED.3IONS-SCNC: 7 MMOL/L (ref 3–14)
AST SERPL W P-5'-P-CCNC: 22 U/L (ref 0–45)
BACTERIA SPEC CULT: NO GROWTH
BILIRUB SERPL-MCNC: 0.6 MG/DL (ref 0.2–1.3)
BUN SERPL-MCNC: 53 MG/DL (ref 7–30)
CALCIUM SERPL-MCNC: 8.9 MG/DL (ref 8.5–10.1)
CHLORIDE SERPL-SCNC: 100 MMOL/L (ref 94–109)
CO2 SERPL-SCNC: 28 MMOL/L (ref 20–32)
CREAT SERPL-MCNC: 1.15 MG/DL (ref 0.66–1.25)
ERYTHROCYTE [DISTWIDTH] IN BLOOD BY AUTOMATED COUNT: 14.2 % (ref 10–15)
GFR SERPL CREATININE-BSD FRML MDRD: 68 ML/MIN/{1.73_M2}
GLUCOSE BLDC GLUCOMTR-MCNC: 247 MG/DL (ref 70–99)
GLUCOSE BLDC GLUCOMTR-MCNC: 280 MG/DL (ref 70–99)
GLUCOSE BLDC GLUCOMTR-MCNC: 298 MG/DL (ref 70–99)
GLUCOSE BLDC GLUCOMTR-MCNC: 308 MG/DL (ref 70–99)
GLUCOSE BLDC GLUCOMTR-MCNC: 95 MG/DL (ref 70–99)
GLUCOSE SERPL-MCNC: 285 MG/DL (ref 70–99)
HCT VFR BLD AUTO: 36.4 % (ref 40–53)
HGB BLD-MCNC: 11.4 G/DL (ref 13.3–17.7)
INR PPP: 4.21 (ref 0.86–1.14)
LDH SERPL L TO P-CCNC: 308 U/L (ref 85–227)
Lab: NORMAL
MAGNESIUM SERPL-MCNC: 1.9 MG/DL (ref 1.6–2.3)
MCH RBC QN AUTO: 28.5 PG (ref 26.5–33)
MCHC RBC AUTO-ENTMCNC: 31.3 G/DL (ref 31.5–36.5)
MCV RBC AUTO: 91 FL (ref 78–100)
MDC_IDC_EPISODE_DTM: NORMAL
MDC_IDC_EPISODE_DURATION: 1 S
MDC_IDC_EPISODE_DURATION: 1 S
MDC_IDC_EPISODE_DURATION: 12 S
MDC_IDC_EPISODE_DURATION: 16 S
MDC_IDC_EPISODE_DURATION: 17 S
MDC_IDC_EPISODE_DURATION: 17 S
MDC_IDC_EPISODE_DURATION: 21 S
MDC_IDC_EPISODE_DURATION: 237 S
MDC_IDC_EPISODE_DURATION: 28 S
MDC_IDC_EPISODE_DURATION: 29 S
MDC_IDC_EPISODE_DURATION: 319 S
MDC_IDC_EPISODE_DURATION: 32 S
MDC_IDC_EPISODE_DURATION: 35 S
MDC_IDC_EPISODE_DURATION: 6316 S
MDC_IDC_EPISODE_DURATION: 742 S
MDC_IDC_EPISODE_DURATION: 760 S
MDC_IDC_EPISODE_DURATION: 8 S
MDC_IDC_EPISODE_DURATION: NORMAL S
MDC_IDC_EPISODE_ID: 308
MDC_IDC_EPISODE_ID: 309
MDC_IDC_EPISODE_ID: 310
MDC_IDC_EPISODE_ID: 311
MDC_IDC_EPISODE_ID: 312
MDC_IDC_EPISODE_ID: 313
MDC_IDC_EPISODE_ID: 314
MDC_IDC_EPISODE_ID: 315
MDC_IDC_EPISODE_ID: 316
MDC_IDC_EPISODE_ID: 317
MDC_IDC_EPISODE_ID: 318
MDC_IDC_EPISODE_ID: NORMAL
MDC_IDC_EPISODE_TYPE: NORMAL
MDC_IDC_LEAD_IMPLANT_DT: NORMAL
MDC_IDC_LEAD_LOCATION: NORMAL
MDC_IDC_LEAD_LOCATION_DETAIL_1: NORMAL
MDC_IDC_LEAD_MFG: NORMAL
MDC_IDC_LEAD_MODEL: NORMAL
MDC_IDC_LEAD_POLARITY_TYPE: NORMAL
MDC_IDC_LEAD_SERIAL: NORMAL
MDC_IDC_MSMT_BATTERY_DTM: NORMAL
MDC_IDC_MSMT_BATTERY_REMAINING_LONGEVITY: 26 MO
MDC_IDC_MSMT_BATTERY_RRT_TRIGGER: 2.73
MDC_IDC_MSMT_BATTERY_STATUS: NORMAL
MDC_IDC_MSMT_BATTERY_VOLTAGE: 2.84 V
MDC_IDC_MSMT_CAP_CHARGE_DTM: NORMAL
MDC_IDC_MSMT_CAP_CHARGE_ENERGY: 18 J
MDC_IDC_MSMT_CAP_CHARGE_TIME: 4.28
MDC_IDC_MSMT_CAP_CHARGE_TYPE: NORMAL
MDC_IDC_MSMT_LEADCHNL_LV_IMPEDANCE_VALUE: 4047 OHM
MDC_IDC_MSMT_LEADCHNL_LV_IMPEDANCE_VALUE: 4047 OHM
MDC_IDC_MSMT_LEADCHNL_LV_IMPEDANCE_VALUE: 437 OHM
MDC_IDC_MSMT_LEADCHNL_LV_PACING_THRESHOLD_AMPLITUDE: 0.62 V
MDC_IDC_MSMT_LEADCHNL_LV_PACING_THRESHOLD_PULSEWIDTH: 0.4 MS
MDC_IDC_MSMT_LEADCHNL_RA_IMPEDANCE_VALUE: 456 OHM
MDC_IDC_MSMT_LEADCHNL_RA_PACING_THRESHOLD_AMPLITUDE: 1.25 V
MDC_IDC_MSMT_LEADCHNL_RA_PACING_THRESHOLD_PULSEWIDTH: 0.4 MS
MDC_IDC_MSMT_LEADCHNL_RA_SENSING_INTR_AMPL: 1 MV
MDC_IDC_MSMT_LEADCHNL_RA_SENSING_INTR_AMPL: 4.12 MV
MDC_IDC_MSMT_LEADCHNL_RV_IMPEDANCE_VALUE: 266 OHM
MDC_IDC_MSMT_LEADCHNL_RV_IMPEDANCE_VALUE: 380 OHM
MDC_IDC_MSMT_LEADCHNL_RV_PACING_THRESHOLD_AMPLITUDE: 1.12 V
MDC_IDC_MSMT_LEADCHNL_RV_PACING_THRESHOLD_PULSEWIDTH: 0.4 MS
MDC_IDC_MSMT_LEADCHNL_RV_SENSING_INTR_AMPL: 6.75 MV
MDC_IDC_MSMT_LEADCHNL_RV_SENSING_INTR_AMPL: 6.75 MV
MDC_IDC_PG_IMPLANT_DTM: NORMAL
MDC_IDC_PG_MFG: NORMAL
MDC_IDC_PG_MODEL: NORMAL
MDC_IDC_PG_SERIAL: NORMAL
MDC_IDC_PG_TYPE: NORMAL
MDC_IDC_SESS_CLINIC_NAME: NORMAL
MDC_IDC_SESS_DTM: NORMAL
MDC_IDC_SESS_TYPE: NORMAL
MDC_IDC_SET_BRADY_AT_MODE_SWITCH_RATE: 150 {BEATS}/MIN
MDC_IDC_SET_BRADY_LOWRATE: 50 {BEATS}/MIN
MDC_IDC_SET_BRADY_MAX_SENSOR_RATE: 130 {BEATS}/MIN
MDC_IDC_SET_BRADY_MAX_TRACKING_RATE: 130 {BEATS}/MIN
MDC_IDC_SET_BRADY_MODE: NORMAL
MDC_IDC_SET_BRADY_PAV_DELAY_LOW: 150 MS
MDC_IDC_SET_BRADY_SAV_DELAY_LOW: 110 MS
MDC_IDC_SET_CRT_LVRV_DELAY: 0 MS
MDC_IDC_SET_CRT_PACED_CHAMBERS: NORMAL
MDC_IDC_SET_LEADCHNL_LV_PACING_AMPLITUDE: 1.75 V
MDC_IDC_SET_LEADCHNL_LV_PACING_ANODE_ELECTRODE_1: NORMAL
MDC_IDC_SET_LEADCHNL_LV_PACING_ANODE_LOCATION_1: NORMAL
MDC_IDC_SET_LEADCHNL_LV_PACING_CAPTURE_MODE: NORMAL
MDC_IDC_SET_LEADCHNL_LV_PACING_CATHODE_ELECTRODE_1: NORMAL
MDC_IDC_SET_LEADCHNL_LV_PACING_CATHODE_LOCATION_1: NORMAL
MDC_IDC_SET_LEADCHNL_LV_PACING_POLARITY: NORMAL
MDC_IDC_SET_LEADCHNL_LV_PACING_PULSEWIDTH: 0.4 MS
MDC_IDC_SET_LEADCHNL_RA_PACING_AMPLITUDE: 2.5 V
MDC_IDC_SET_LEADCHNL_RA_PACING_ANODE_ELECTRODE_1: NORMAL
MDC_IDC_SET_LEADCHNL_RA_PACING_ANODE_LOCATION_1: NORMAL
MDC_IDC_SET_LEADCHNL_RA_PACING_CAPTURE_MODE: NORMAL
MDC_IDC_SET_LEADCHNL_RA_PACING_CATHODE_ELECTRODE_1: NORMAL
MDC_IDC_SET_LEADCHNL_RA_PACING_CATHODE_LOCATION_1: NORMAL
MDC_IDC_SET_LEADCHNL_RA_PACING_POLARITY: NORMAL
MDC_IDC_SET_LEADCHNL_RA_PACING_PULSEWIDTH: 0.4 MS
MDC_IDC_SET_LEADCHNL_RA_SENSING_ANODE_ELECTRODE_1: NORMAL
MDC_IDC_SET_LEADCHNL_RA_SENSING_ANODE_LOCATION_1: NORMAL
MDC_IDC_SET_LEADCHNL_RA_SENSING_CATHODE_ELECTRODE_1: NORMAL
MDC_IDC_SET_LEADCHNL_RA_SENSING_CATHODE_LOCATION_1: NORMAL
MDC_IDC_SET_LEADCHNL_RA_SENSING_POLARITY: NORMAL
MDC_IDC_SET_LEADCHNL_RA_SENSING_SENSITIVITY: 0.45 MV
MDC_IDC_SET_LEADCHNL_RV_PACING_AMPLITUDE: 2.25 V
MDC_IDC_SET_LEADCHNL_RV_PACING_ANODE_ELECTRODE_1: NORMAL
MDC_IDC_SET_LEADCHNL_RV_PACING_ANODE_LOCATION_1: NORMAL
MDC_IDC_SET_LEADCHNL_RV_PACING_CAPTURE_MODE: NORMAL
MDC_IDC_SET_LEADCHNL_RV_PACING_CATHODE_ELECTRODE_1: NORMAL
MDC_IDC_SET_LEADCHNL_RV_PACING_CATHODE_LOCATION_1: NORMAL
MDC_IDC_SET_LEADCHNL_RV_PACING_POLARITY: NORMAL
MDC_IDC_SET_LEADCHNL_RV_PACING_PULSEWIDTH: 0.4 MS
MDC_IDC_SET_LEADCHNL_RV_SENSING_ANODE_ELECTRODE_1: NORMAL
MDC_IDC_SET_LEADCHNL_RV_SENSING_ANODE_LOCATION_1: NORMAL
MDC_IDC_SET_LEADCHNL_RV_SENSING_CATHODE_ELECTRODE_1: NORMAL
MDC_IDC_SET_LEADCHNL_RV_SENSING_CATHODE_LOCATION_1: NORMAL
MDC_IDC_SET_LEADCHNL_RV_SENSING_POLARITY: NORMAL
MDC_IDC_SET_LEADCHNL_RV_SENSING_SENSITIVITY: 0.3 MV
MDC_IDC_SET_ZONE_DETECTION_BEATS_DENOMINATOR: 40 {BEATS}
MDC_IDC_SET_ZONE_DETECTION_BEATS_NUMERATOR: 30 {BEATS}
MDC_IDC_SET_ZONE_DETECTION_INTERVAL: 240 MS
MDC_IDC_SET_ZONE_DETECTION_INTERVAL: 320 MS
MDC_IDC_SET_ZONE_DETECTION_INTERVAL: 360 MS
MDC_IDC_SET_ZONE_DETECTION_INTERVAL: 400 MS
MDC_IDC_SET_ZONE_DETECTION_INTERVAL: 400 MS
MDC_IDC_SET_ZONE_TYPE: NORMAL
MDC_IDC_STAT_AT_BURDEN_PERCENT: 100 %
MDC_IDC_STAT_AT_DTM_END: NORMAL
MDC_IDC_STAT_AT_DTM_START: NORMAL
MDC_IDC_STAT_BRADY_AP_VP_PERCENT: 1.24 %
MDC_IDC_STAT_BRADY_AP_VS_PERCENT: 0.52 %
MDC_IDC_STAT_BRADY_AS_VP_PERCENT: 24.64 %
MDC_IDC_STAT_BRADY_AS_VS_PERCENT: 73.61 %
MDC_IDC_STAT_BRADY_DTM_END: NORMAL
MDC_IDC_STAT_BRADY_DTM_START: NORMAL
MDC_IDC_STAT_BRADY_RA_PERCENT_PACED: 1.21 %
MDC_IDC_STAT_BRADY_RV_PERCENT_PACED: 28.63 %
MDC_IDC_STAT_CRT_DTM_END: NORMAL
MDC_IDC_STAT_CRT_DTM_START: NORMAL
MDC_IDC_STAT_CRT_LV_PERCENT_PACED: 28.2 %
MDC_IDC_STAT_CRT_PERCENT_PACED: 28.2 %
MDC_IDC_STAT_EPISODE_RECENT_COUNT: 0
MDC_IDC_STAT_EPISODE_RECENT_COUNT: 1
MDC_IDC_STAT_EPISODE_RECENT_COUNT: 1
MDC_IDC_STAT_EPISODE_RECENT_COUNT: 2
MDC_IDC_STAT_EPISODE_RECENT_COUNT: 4
MDC_IDC_STAT_EPISODE_RECENT_COUNT_DTM_END: NORMAL
MDC_IDC_STAT_EPISODE_RECENT_COUNT_DTM_START: NORMAL
MDC_IDC_STAT_EPISODE_TOTAL_COUNT: 0
MDC_IDC_STAT_EPISODE_TOTAL_COUNT: 0
MDC_IDC_STAT_EPISODE_TOTAL_COUNT: 15
MDC_IDC_STAT_EPISODE_TOTAL_COUNT: 17
MDC_IDC_STAT_EPISODE_TOTAL_COUNT: 182
MDC_IDC_STAT_EPISODE_TOTAL_COUNT: 2
MDC_IDC_STAT_EPISODE_TOTAL_COUNT: 5
MDC_IDC_STAT_EPISODE_TOTAL_COUNT_DTM_END: NORMAL
MDC_IDC_STAT_EPISODE_TOTAL_COUNT_DTM_START: NORMAL
MDC_IDC_STAT_EPISODE_TYPE: NORMAL
MDC_IDC_STAT_TACHYTHERAPY_ATP_DELIVERED_RECENT: 0
MDC_IDC_STAT_TACHYTHERAPY_ATP_DELIVERED_TOTAL: 13
MDC_IDC_STAT_TACHYTHERAPY_RECENT_DTM_END: NORMAL
MDC_IDC_STAT_TACHYTHERAPY_RECENT_DTM_START: NORMAL
MDC_IDC_STAT_TACHYTHERAPY_SHOCKS_ABORTED_RECENT: 1
MDC_IDC_STAT_TACHYTHERAPY_SHOCKS_ABORTED_TOTAL: 2
MDC_IDC_STAT_TACHYTHERAPY_SHOCKS_DELIVERED_RECENT: 0
MDC_IDC_STAT_TACHYTHERAPY_SHOCKS_DELIVERED_TOTAL: 2
MDC_IDC_STAT_TACHYTHERAPY_TOTAL_DTM_END: NORMAL
MDC_IDC_STAT_TACHYTHERAPY_TOTAL_DTM_START: NORMAL
PLATELET # BLD AUTO: 280 10E9/L (ref 150–450)
POTASSIUM SERPL-SCNC: 4 MMOL/L (ref 3.4–5.3)
PROT SERPL-MCNC: 6.5 G/DL (ref 6.8–8.8)
RBC # BLD AUTO: 4 10E12/L (ref 4.4–5.9)
SODIUM SERPL-SCNC: 135 MMOL/L (ref 133–144)
SPECIMEN SOURCE: NORMAL
WBC # BLD AUTO: 8 10E9/L (ref 4–11)

## 2020-01-22 PROCEDURE — 00000146 ZZHCL STATISTIC GLUCOSE BY METER IP

## 2020-01-22 PROCEDURE — 85027 COMPLETE CBC AUTOMATED: CPT | Performed by: INTERNAL MEDICINE

## 2020-01-22 PROCEDURE — 25000125 ZZHC RX 250

## 2020-01-22 PROCEDURE — 93750 INTERROGATION VAD IN PERSON: CPT | Performed by: NURSE PRACTITIONER

## 2020-01-22 PROCEDURE — 94640 AIRWAY INHALATION TREATMENT: CPT | Mod: 76

## 2020-01-22 PROCEDURE — 83615 LACTATE (LD) (LDH) ENZYME: CPT | Performed by: INTERNAL MEDICINE

## 2020-01-22 PROCEDURE — 80053 COMPREHEN METABOLIC PANEL: CPT | Performed by: INTERNAL MEDICINE

## 2020-01-22 PROCEDURE — 25000131 ZZH RX MED GY IP 250 OP 636 PS 637: Performed by: STUDENT IN AN ORGANIZED HEALTH CARE EDUCATION/TRAINING PROGRAM

## 2020-01-22 PROCEDURE — 40000275 ZZH STATISTIC RCP TIME EA 10 MIN

## 2020-01-22 PROCEDURE — 94640 AIRWAY INHALATION TREATMENT: CPT

## 2020-01-22 PROCEDURE — 85610 PROTHROMBIN TIME: CPT | Performed by: INTERNAL MEDICINE

## 2020-01-22 PROCEDURE — 99232 SBSQ HOSP IP/OBS MODERATE 35: CPT | Mod: 25 | Performed by: NURSE PRACTITIONER

## 2020-01-22 PROCEDURE — 83735 ASSAY OF MAGNESIUM: CPT | Performed by: INTERNAL MEDICINE

## 2020-01-22 PROCEDURE — 25000128 H RX IP 250 OP 636: Performed by: NURSE PRACTITIONER

## 2020-01-22 PROCEDURE — 25000125 ZZHC RX 250: Performed by: NURSE PRACTITIONER

## 2020-01-22 PROCEDURE — 21400000 ZZH R&B CCU UMMC

## 2020-01-22 PROCEDURE — 25000132 ZZH RX MED GY IP 250 OP 250 PS 637: Performed by: STUDENT IN AN ORGANIZED HEALTH CARE EDUCATION/TRAINING PROGRAM

## 2020-01-22 PROCEDURE — 94660 CPAP INITIATION&MGMT: CPT

## 2020-01-22 PROCEDURE — 36415 COLL VENOUS BLD VENIPUNCTURE: CPT | Performed by: INTERNAL MEDICINE

## 2020-01-22 RX ORDER — LOSARTAN POTASSIUM 50 MG/1
50 TABLET ORAL DAILY
Status: DISCONTINUED | OUTPATIENT
Start: 2020-01-23 | End: 2020-01-24 | Stop reason: HOSPADM

## 2020-01-22 RX ORDER — IPRATROPIUM BROMIDE AND ALBUTEROL SULFATE 2.5; .5 MG/3ML; MG/3ML
SOLUTION RESPIRATORY (INHALATION)
Status: COMPLETED
Start: 2020-01-22 | End: 2020-01-22

## 2020-01-22 RX ORDER — LEVALBUTEROL INHALATION SOLUTION 1.25 MG/3ML
1.25 SOLUTION RESPIRATORY (INHALATION) EVERY 6 HOURS
Status: DISCONTINUED | OUTPATIENT
Start: 2020-01-22 | End: 2020-01-24 | Stop reason: HOSPADM

## 2020-01-22 RX ADMIN — BUMETANIDE 2 MG: 0.25 INJECTION INTRAMUSCULAR; INTRAVENOUS at 09:27

## 2020-01-22 RX ADMIN — PREDNISONE 40 MG: 20 TABLET ORAL at 09:26

## 2020-01-22 RX ADMIN — METOPROLOL SUCCINATE 50 MG: 50 TABLET, EXTENDED RELEASE ORAL at 09:26

## 2020-01-22 RX ADMIN — GABAPENTIN 300 MG: 300 CAPSULE ORAL at 09:27

## 2020-01-22 RX ADMIN — METOPROLOL SUCCINATE 25 MG: 25 TABLET, EXTENDED RELEASE ORAL at 20:46

## 2020-01-22 RX ADMIN — UMECLIDINIUM 1 PUFF: 62.5 AEROSOL, POWDER ORAL at 09:25

## 2020-01-22 RX ADMIN — INSULIN ASPART 10 UNITS: 100 INJECTION, SOLUTION INTRAVENOUS; SUBCUTANEOUS at 11:53

## 2020-01-22 RX ADMIN — GUAIFENESIN 10 ML: 100 SOLUTION ORAL at 13:17

## 2020-01-22 RX ADMIN — LEVALBUTEROL HYDROCHLORIDE 1.25 MG: 1.25 SOLUTION RESPIRATORY (INHALATION) at 09:00

## 2020-01-22 RX ADMIN — INSULIN ASPART 5 UNITS: 100 INJECTION, SOLUTION INTRAVENOUS; SUBCUTANEOUS at 00:01

## 2020-01-22 RX ADMIN — LEVALBUTEROL HYDROCHLORIDE 1.25 MG: 1.25 SOLUTION RESPIRATORY (INHALATION) at 20:18

## 2020-01-22 RX ADMIN — GABAPENTIN 300 MG: 300 CAPSULE ORAL at 11:53

## 2020-01-22 RX ADMIN — SPIRONOLACTONE 25 MG: 25 TABLET ORAL at 20:46

## 2020-01-22 RX ADMIN — POTASSIUM CHLORIDE 20 MEQ: 750 TABLET, EXTENDED RELEASE ORAL at 09:27

## 2020-01-22 RX ADMIN — Medication 37.5 MG: at 09:35

## 2020-01-22 RX ADMIN — FLUTICASONE FUROATE AND VILANTEROL TRIFENATATE 1 PUFF: 200; 25 POWDER RESPIRATORY (INHALATION) at 09:25

## 2020-01-22 RX ADMIN — IPRATROPIUM BROMIDE AND ALBUTEROL SULFATE 3 ML: .5; 3 SOLUTION RESPIRATORY (INHALATION) at 13:22

## 2020-01-22 RX ADMIN — DOXYCYCLINE 100 MG: 100 CAPSULE ORAL at 09:34

## 2020-01-22 RX ADMIN — MONTELUKAST 10 MG: 10 TABLET, FILM COATED ORAL at 21:35

## 2020-01-22 RX ADMIN — SPIRONOLACTONE 25 MG: 25 TABLET ORAL at 09:26

## 2020-01-22 RX ADMIN — PANTOPRAZOLE SODIUM 40 MG: 40 TABLET, DELAYED RELEASE ORAL at 09:27

## 2020-01-22 RX ADMIN — ACETAMINOPHEN 650 MG: 325 TABLET, FILM COATED ORAL at 14:15

## 2020-01-22 RX ADMIN — DOXYCYCLINE 100 MG: 100 CAPSULE ORAL at 20:47

## 2020-01-22 RX ADMIN — TRAMADOL HYDROCHLORIDE 100 MG: 50 TABLET ORAL at 15:56

## 2020-01-22 RX ADMIN — BUMETANIDE 2 MG: 0.25 INJECTION INTRAMUSCULAR; INTRAVENOUS at 17:37

## 2020-01-22 RX ADMIN — GABAPENTIN 600 MG: 300 CAPSULE ORAL at 20:47

## 2020-01-22 RX ADMIN — ALLOPURINOL 100 MG: 100 TABLET ORAL at 09:27

## 2020-01-22 RX ADMIN — ASPIRIN 81 MG CHEWABLE TABLET 81 MG: 81 TABLET CHEWABLE at 09:26

## 2020-01-22 RX ADMIN — INSULIN ASPART 10 UNITS: 100 INJECTION, SOLUTION INTRAVENOUS; SUBCUTANEOUS at 16:37

## 2020-01-22 RX ADMIN — GUAIFENESIN 10 ML: 100 SOLUTION ORAL at 02:38

## 2020-01-22 RX ADMIN — CITALOPRAM HYDROBROMIDE 20 MG: 20 TABLET ORAL at 09:27

## 2020-01-22 ASSESSMENT — ACTIVITIES OF DAILY LIVING (ADL)
ADLS_ACUITY_SCORE: 18

## 2020-01-22 ASSESSMENT — PAIN DESCRIPTION - DESCRIPTORS: DESCRIPTORS: ACHING

## 2020-01-22 ASSESSMENT — MIFFLIN-ST. JEOR: SCORE: 1931.63

## 2020-01-22 NOTE — PLAN OF CARE
D/A/I:  Patient A&O x4, denied pain, palpitations, dizziness, and nausea.  Had shallow breathing and reported RESENDIZ, lung sounds diminished in all fields with fine crackles in the bases.  Also had a frequent congested cough.  Was given scheduled IV bumetanide; patient had good urine output, see I&O flowsheet.  In atrial fibrillation with BBB, HR 120s-140s, SBP 90s-120s, SaO2 97-98% on room air.  HeartMate II LVAD running at a fixed rate of 9400 rpm, no alarms during shift.  Flow 4.2-4.8, PI 4.7-5.4.  Drive line site clean and dry as seen through weekly transparent dressing, due for change 1/27.  Ambulated in room with standby assist, was steady on his feet.  Friend visited during shift.    P:  Continue to monitor pain, VS, heart rhythm, LVAD function, drive line site integrity, fluid status, bowel status, cardiac and respiratory status.  Notify care team of changes in patient condition or other concerns.  Continue intermittent diuresis.  Awaiting results from cultures.

## 2020-01-22 NOTE — PLAN OF CARE
D-Droplet precautions observed. Pt's lung sounds diminished throughout. Pt wears cpap at night. Pt's fsGlu values running high (300's), MD notified and ordered extra SS coverage, and made adjustment in pt's insulin doses. Pt has a frequent productive cough and an order for Robitussin was obtained and given>less frequent cough. Afib continues, 's -150's, higher rate noted after Neb.pt asymptomatic.  LVAD values WNL.  I-monitored, assessed and addressed pt's status and comfort for changes  A-poor glycemic control possibly d/t steroids treatment r/t dx of influenza Robitussin appears to be effective in treating pt's cough. Pt sleep improved 2/2 dec cough.  P-Continue to monitor, assess and address pt's status and comfort for changes.

## 2020-01-22 NOTE — PLAN OF CARE
OT/CR orders received and acknowledged. Checked in on pt today and pt significantly limited by SOB with activity, tachycardia, and also observed to be diaphoretic sitting EOB. Pt declining formal eval and participation in therapy today due to significant SOB and fatigue with activity. Pt agreeable to seated exercises and standing activity as tolerated to prevent deconditioning. Will check in tomorrow.

## 2020-01-22 NOTE — PROGRESS NOTES
[facility]   Cardiology II Service / Advanced Heart Failure  Daily Progress Note  Date of Service: 1/22/2020      Patient: Jim Willingham  MRN: [id number]  Admission Date: 1/20/2020  Hospital Day # 2    Assessment and Plan: Mr. Willingham is a 64 year old male with a past medical history including CKD Stage III, DM Type II, CECILIA, HTN, COPD, Asthma, and NICM with EF 20% s/p LVAD HM II 6/16/17 as DT due to obesity. His postoperative course was complicated by arrhythmias, WALTER, and left small pleural effusions     Influenza B with COPD Exacerbation. Positive 1/15/20. Completed course of Tamiflu. Chest X-ray negative for consolidation.  - Doxycycline 100 mg po BID for antiinflammatory effect for total of 5 days. Sputum positive for moderate H Influenza.   - Prednisone 40 mg po daily.   -  Xopenox nebs increased to q6 per patient request.   - Bumex 2 mg IV BID today, likely transition to po in AM.   - Continue prior to admission inhalers and Singulair.   - Blood cultures NTD.   - Repeat UC pending.      Chronic systolic heart failure secondary to NICM s/p LVAD HM II. Implanted 6/16/17.   Stage D, NYHA Class III  ACEi/ARB Increase Losartan to 50 mg po daily   BB Toprol X 50 mg in AM and 25 mg at HS.   Aldosterone antagonist Aldactone 25 mg po BID   SCD prophylaxis CRT-D  Fluid status: Hypervolemic. Continue Bumex 2 mg IV BID.   Sleep Apnea Evaluation: CPAP  MAP: 84-90. Losartan increased.   LDH: 359  Anticoagulation: Coumadin per AC. INR-4.21. Goal INR 2-3.  Antiplatelet: ASA 81 mg po daily      The patient's HeartMate LVAD was interrogated 1/22/2020  * Speed 9400 rpm   * Pulsatility index 5.9  * Power 5.3 Manuel   * Flow 4.4 L/minute   Fluid status: hypervolemic   Alarms were reviewed, and negative for acute events.   The driveline exit site was covered with dressing clean, dry, and intact.   All external components were inspected and showed no evidence of damage or malfunction.    Paroxysmal Afib JERMAINE today.   -  "Baseline HR 88 with current rate 110-120's. Diuresis with Bumex.   - Continue Toprol XL 50 mg in AM and 25 mg at HS.   - Coumadin as above.      DM Type II, Uncontrolled. Elevated in setting of steroids. Metformin on hold inpatient.   - sliding scale insulin increased to high intensity, Carb coverage added this AM.      CKD Stage III.   - Cr 1.15.     HTN  - MAP-. Monitor with diuresis.      Chronic Medical Conditions:  Depression. Continue Celexa.   GERD. Continue Protonix.   Gout. Continue Allopurinol.   Obesity s/p Gastric Bypass with Chronic anemia.      FEN: 2 gram NA diet   PROPHY:  Coumadin. Protonix.   LINES: PIV  DISPO: Anticipate discharge to home in 1-2 days  CODE STATUS:   Full Code   ================================================================  Interval History/ROS: He complains of persistent RESENDIZ and cough, but notes mild improvement today. He denies fever, chills, chest pain, palpitations, nausea, vomiting, diarrhea, hematochezia, melena, LE edema, and concerns at his drive line site. He is tolerating oral intake well and notes limited ambulation due to RESENDIZ.     Last 24 hr care team notes reviewed.   ROS:  4 point ROS including Respiratory, CV, GI and , other than that noted in the HPI, is negative.     Medications: Reviewed in EPIC.     Physical Exam:   /83 (BP Location: Right arm)   Pulse 67   Temp 97.6  F (36.4  C) (Oral)   Resp 18   Ht 1.727 m (5' 8\")   Wt 115.7 kg (255 lb 1.6 oz)   SpO2 (P) 94%   BMI 38.79 kg/m    GENERAL: Appears alert and oriented times three.   HEENT: Eye symmetrical and free of discharge bilaterally. Mucous membranes moist and without lesions.  NECK: Supple and without lymphadenopathy. JVD 10 cm  CV: RRR, S1S2 present with LVAD hum.   RESPIRATORY: Respirations regular, even, and unlabored. Diminished lung sounds throughout, improving.   GI: Soft and mildly distended with normoactive bowel sounds present in all quadrants. No tenderness, rebound, " guarding. No organomegaly.   EXTREMITIES: Trace bilateral LE peripheral edema. 2+ bilateral pedal pulses.   NEUROLOGIC: Alert and orientated x 3. CN II-XII grossly intact. No focal deficits.   MUSCULOSKELETAL: No joint swelling or tenderness.   SKIN: No jaundice. No rashes or lesions. LVAD drive line covered.     Data:  CMP  Recent Labs   Lab 01/22/20  0624 01/21/20  0639 01/21/20  0011 01/20/20  1759 01/16/20  0549 01/15/20  2208    131* 131* 134 132* 132*   POTASSIUM 4.0 4.9 5.1 4.0 3.9 3.0*   CHLORIDE 100 97 97 99 97 99   CO2 28 25 23 29 26 28   ANIONGAP 7 10 11 5 8 6   * 399* 486* 273* 270* 138*   BUN 53* 45* 45* 40* 26 21   CR 1.15 1.19 1.28* 1.18 1.16 1.00   GFRESTIMATED 68 65 59* 65 67 80   GFRESTBLACK 78 75 69 76 77 >90   QAMAR 8.9 8.2* 8.1* 8.5 7.8* 7.7*   MAG 1.9 1.8 1.7  --  1.5*  --    PROTTOTAL 6.5*  --  6.8  --  6.6* 6.3*   ALBUMIN 3.0*  --  3.2*  --  3.4 3.3*   BILITOTAL 0.6  --  0.5  --  1.0 0.9   ALKPHOS 165*  --  185*  --  87 84   AST 22  --  36  --  35 38   ALT 65  --  91*  --  47 46     CBC  Recent Labs   Lab 01/22/20  0624 01/21/20  0639 01/21/20  0011 01/20/20  1759   WBC 8.0 7.8 9.1 9.7   RBC 4.00* 4.06* 3.98* 4.09*   HGB 11.4* 11.5* 11.3* 11.6*   HCT 36.4* 37.8* 37.3* 38.1*   MCV 91 93 94 93   MCH 28.5 28.3 28.4 28.4   MCHC 31.3* 30.4* 30.3* 30.4*   RDW 14.2 14.2 14.3 14.3    267 266 283     INR  Recent Labs   Lab 01/22/20  0624 01/21/20  0639 01/21/20  0011 01/16/20  0549   INR 4.21* 4.61* 4.59* 1.98*       Patient discussed with Dr. Conte.      Soraya Marshall FNP  1/22/2020

## 2020-01-22 NOTE — PROGRESS NOTES
CLINICAL NUTRITION SERVICES    Received nutrition consult to educate pt on 2 g sodium diet (automatic check on order set).  Per pt, understands this education, has had previously, and desires no further nutrition education at this time.    Patient has had poor appetite x  1 week per his report.  Offered oral nutrition supplements - patient to order as he pleases.     Xiomara Su MS, RD, LD  Pager [phone]

## 2020-01-23 LAB
ANION GAP SERPL CALCULATED.3IONS-SCNC: 7 MMOL/L (ref 3–14)
BUN SERPL-MCNC: 53 MG/DL (ref 7–30)
CALCIUM SERPL-MCNC: 8.8 MG/DL (ref 8.5–10.1)
CHLORIDE SERPL-SCNC: 99 MMOL/L (ref 94–109)
CO2 SERPL-SCNC: 30 MMOL/L (ref 20–32)
CREAT SERPL-MCNC: 1.27 MG/DL (ref 0.66–1.25)
GFR SERPL CREATININE-BSD FRML MDRD: 60 ML/MIN/{1.73_M2}
GLUCOSE BLDC GLUCOMTR-MCNC: 135 MG/DL (ref 70–99)
GLUCOSE BLDC GLUCOMTR-MCNC: 164 MG/DL (ref 70–99)
GLUCOSE BLDC GLUCOMTR-MCNC: 168 MG/DL (ref 70–99)
GLUCOSE BLDC GLUCOMTR-MCNC: 168 MG/DL (ref 70–99)
GLUCOSE BLDC GLUCOMTR-MCNC: 174 MG/DL (ref 70–99)
GLUCOSE BLDC GLUCOMTR-MCNC: 176 MG/DL (ref 70–99)
GLUCOSE SERPL-MCNC: 161 MG/DL (ref 70–99)
INR PPP: 2.74 (ref 0.86–1.14)
LDH SERPL L TO P-CCNC: 403 U/L (ref 85–227)
MAGNESIUM SERPL-MCNC: 2 MG/DL (ref 1.6–2.3)
POTASSIUM SERPL-SCNC: 3.8 MMOL/L (ref 3.4–5.3)
SODIUM SERPL-SCNC: 137 MMOL/L (ref 133–144)

## 2020-01-23 PROCEDURE — 25000132 ZZH RX MED GY IP 250 OP 250 PS 637: Performed by: INTERNAL MEDICINE

## 2020-01-23 PROCEDURE — 36415 COLL VENOUS BLD VENIPUNCTURE: CPT | Performed by: INTERNAL MEDICINE

## 2020-01-23 PROCEDURE — 25000132 ZZH RX MED GY IP 250 OP 250 PS 637: Performed by: STUDENT IN AN ORGANIZED HEALTH CARE EDUCATION/TRAINING PROGRAM

## 2020-01-23 PROCEDURE — 00000146 ZZHCL STATISTIC GLUCOSE BY METER IP

## 2020-01-23 PROCEDURE — 85610 PROTHROMBIN TIME: CPT | Performed by: INTERNAL MEDICINE

## 2020-01-23 PROCEDURE — 40000275 ZZH STATISTIC RCP TIME EA 10 MIN

## 2020-01-23 PROCEDURE — 21400000 ZZH R&B CCU UMMC

## 2020-01-23 PROCEDURE — 83615 LACTATE (LD) (LDH) ENZYME: CPT | Performed by: INTERNAL MEDICINE

## 2020-01-23 PROCEDURE — 25000128 H RX IP 250 OP 636: Performed by: NURSE PRACTITIONER

## 2020-01-23 PROCEDURE — 99232 SBSQ HOSP IP/OBS MODERATE 35: CPT | Performed by: INTERNAL MEDICINE

## 2020-01-23 PROCEDURE — 80048 BASIC METABOLIC PNL TOTAL CA: CPT | Performed by: INTERNAL MEDICINE

## 2020-01-23 PROCEDURE — 83735 ASSAY OF MAGNESIUM: CPT | Performed by: INTERNAL MEDICINE

## 2020-01-23 PROCEDURE — 94640 AIRWAY INHALATION TREATMENT: CPT

## 2020-01-23 PROCEDURE — 94640 AIRWAY INHALATION TREATMENT: CPT | Mod: 76

## 2020-01-23 PROCEDURE — 25000125 ZZHC RX 250: Performed by: NURSE PRACTITIONER

## 2020-01-23 PROCEDURE — 25000132 ZZH RX MED GY IP 250 OP 250 PS 637: Performed by: NURSE PRACTITIONER

## 2020-01-23 PROCEDURE — 25000131 ZZH RX MED GY IP 250 OP 636 PS 637: Performed by: STUDENT IN AN ORGANIZED HEALTH CARE EDUCATION/TRAINING PROGRAM

## 2020-01-23 PROCEDURE — 40000894 ZZH STATISTIC OT IP EVAL DEFER

## 2020-01-23 RX ORDER — WARFARIN SODIUM 7.5 MG/1
7.5 TABLET ORAL
Status: COMPLETED | OUTPATIENT
Start: 2020-01-23 | End: 2020-01-23

## 2020-01-23 RX ORDER — LEVALBUTEROL INHALATION SOLUTION 1.25 MG/3ML
1.25 SOLUTION RESPIRATORY (INHALATION) EVERY 6 HOURS PRN
Qty: 30 ML | Refills: 0 | Status: SHIPPED | OUTPATIENT
Start: 2020-01-23 | End: 2020-01-24

## 2020-01-23 RX ORDER — BUMETANIDE 1 MG/1
2 TABLET ORAL
Status: DISCONTINUED | OUTPATIENT
Start: 2020-01-23 | End: 2020-01-24

## 2020-01-23 RX ORDER — LOSARTAN POTASSIUM 25 MG/1
50 TABLET ORAL DAILY
Qty: 60 TABLET | Refills: 0 | Status: SHIPPED | OUTPATIENT
Start: 2020-01-23 | End: 2020-01-24

## 2020-01-23 RX ORDER — DOXYCYCLINE 100 MG/1
100 CAPSULE ORAL EVERY 12 HOURS
Qty: 8 CAPSULE | Refills: 0 | Status: SHIPPED | OUTPATIENT
Start: 2020-01-24 | End: 2020-01-24

## 2020-01-23 RX ADMIN — PANTOPRAZOLE SODIUM 40 MG: 40 TABLET, DELAYED RELEASE ORAL at 09:26

## 2020-01-23 RX ADMIN — INSULIN ASPART 10 UNITS: 100 INJECTION, SOLUTION INTRAVENOUS; SUBCUTANEOUS at 13:19

## 2020-01-23 RX ADMIN — INSULIN ASPART 10 UNITS: 100 INJECTION, SOLUTION INTRAVENOUS; SUBCUTANEOUS at 09:17

## 2020-01-23 RX ADMIN — BUMETANIDE 2 MG: 0.25 INJECTION INTRAMUSCULAR; INTRAVENOUS at 09:25

## 2020-01-23 RX ADMIN — CITALOPRAM HYDROBROMIDE 20 MG: 20 TABLET ORAL at 09:25

## 2020-01-23 RX ADMIN — WARFARIN SODIUM 7.5 MG: 7.5 TABLET ORAL at 18:22

## 2020-01-23 RX ADMIN — ASPIRIN 81 MG CHEWABLE TABLET 81 MG: 81 TABLET CHEWABLE at 09:26

## 2020-01-23 RX ADMIN — LEVALBUTEROL HYDROCHLORIDE 1.25 MG: 1.25 SOLUTION RESPIRATORY (INHALATION) at 08:25

## 2020-01-23 RX ADMIN — GABAPENTIN 300 MG: 300 CAPSULE ORAL at 12:32

## 2020-01-23 RX ADMIN — METOPROLOL SUCCINATE 25 MG: 25 TABLET, EXTENDED RELEASE ORAL at 21:12

## 2020-01-23 RX ADMIN — INSULIN ASPART 10 UNITS: 100 INJECTION, SOLUTION INTRAVENOUS; SUBCUTANEOUS at 18:24

## 2020-01-23 RX ADMIN — GABAPENTIN 300 MG: 300 CAPSULE ORAL at 09:26

## 2020-01-23 RX ADMIN — FLUTICASONE FUROATE AND VILANTEROL TRIFENATATE 1 PUFF: 200; 25 POWDER RESPIRATORY (INHALATION) at 09:24

## 2020-01-23 RX ADMIN — LEVALBUTEROL HYDROCHLORIDE 1.25 MG: 1.25 SOLUTION RESPIRATORY (INHALATION) at 13:37

## 2020-01-23 RX ADMIN — SPIRONOLACTONE 25 MG: 25 TABLET ORAL at 09:26

## 2020-01-23 RX ADMIN — MONTELUKAST 10 MG: 10 TABLET, FILM COATED ORAL at 21:12

## 2020-01-23 RX ADMIN — ALLOPURINOL 100 MG: 100 TABLET ORAL at 09:25

## 2020-01-23 RX ADMIN — LEVALBUTEROL HYDROCHLORIDE 1.25 MG: 1.25 SOLUTION RESPIRATORY (INHALATION) at 01:59

## 2020-01-23 RX ADMIN — PREDNISONE 40 MG: 20 TABLET ORAL at 09:24

## 2020-01-23 RX ADMIN — DOXYCYCLINE 100 MG: 100 CAPSULE ORAL at 21:12

## 2020-01-23 RX ADMIN — POTASSIUM CHLORIDE 20 MEQ: 750 TABLET, EXTENDED RELEASE ORAL at 09:25

## 2020-01-23 RX ADMIN — METOPROLOL SUCCINATE 50 MG: 50 TABLET, EXTENDED RELEASE ORAL at 09:25

## 2020-01-23 RX ADMIN — GUAIFENESIN 10 ML: 100 SOLUTION ORAL at 01:41

## 2020-01-23 RX ADMIN — SPIRONOLACTONE 25 MG: 25 TABLET ORAL at 21:12

## 2020-01-23 RX ADMIN — TRAMADOL HYDROCHLORIDE 100 MG: 50 TABLET ORAL at 16:51

## 2020-01-23 RX ADMIN — DOXYCYCLINE 100 MG: 100 CAPSULE ORAL at 09:24

## 2020-01-23 RX ADMIN — GABAPENTIN 600 MG: 300 CAPSULE ORAL at 21:12

## 2020-01-23 RX ADMIN — GUAIFENESIN 10 ML: 100 SOLUTION ORAL at 09:51

## 2020-01-23 RX ADMIN — BUMETANIDE 2 MG: 1 TABLET ORAL at 16:51

## 2020-01-23 RX ADMIN — LEVALBUTEROL HYDROCHLORIDE 1.25 MG: 1.25 SOLUTION RESPIRATORY (INHALATION) at 20:47

## 2020-01-23 RX ADMIN — UMECLIDINIUM 1 PUFF: 62.5 AEROSOL, POWDER ORAL at 09:24

## 2020-01-23 RX ADMIN — LOSARTAN POTASSIUM 50 MG: 50 TABLET, FILM COATED ORAL at 09:26

## 2020-01-23 ASSESSMENT — ACTIVITIES OF DAILY LIVING (ADL)
ADLS_ACUITY_SCORE: 18

## 2020-01-23 ASSESSMENT — PAIN DESCRIPTION - DESCRIPTORS: DESCRIPTORS: ACHING

## 2020-01-23 ASSESSMENT — MIFFLIN-ST. JEOR: SCORE: 1913.94

## 2020-01-23 NOTE — PLAN OF CARE
D/A/I:  Patient A&O x4, denied palpitations, dizziness, and nausea.  Had RESENDIZ, intermittent expiratory wheezes noted in all lung fields.  Was given scheduled nebulizer treatments, see MAR.  Was also given scheduled IV bumetanide; patient had good urine output, see I&O flowsheet.  In atrial fibrillation with BBB, HR 60s-140s, SBP 90s-100s, SaO2 94-96% on room air.  HeartMate 2 LVAD running at a fixed rate of 9400 rpm, no alarms during shift.  Flow 4.4-4.6, PI 5.5-5.9.  Drive line site clean and dry as seen through weekly transparent dressing, due for change 1/27.  Reported head and shoulder pain that improved after use of prn acetaminophen and tramadol, respectively.  Ambulated in room with standby assist, was steady on his feet.    P:  Continue to monitor pain, VS, heart rhythm, LVAD function, drive line site integrity, fluid status, bowel status, cardiac and respiratory status.  Notify care team of changes in patient condition or other concerns.  Continue diuresis, treat infection.

## 2020-01-23 NOTE — PLAN OF CARE
D-pt slept through the night most of the night without complaints. Pt on room air during the night with O2 sats in the mid 90's . Pt received Robitussin for his cough and improved ability to sleep was noted. LVAD values In the 4's, WNL.  I-monitored, assessed and addressed pt's status and comfort for changes.  A-uneventful night  P-Continue to monitor, assess and address pt's status and comfort for changes and treat per orders.

## 2020-01-23 NOTE — PLAN OF CARE
OT 6C: Defer. Collaborated with pt on activity limitations and discharge concerns and no acute OT/CR needs identified. Will complete orders given pt likely to discharge home tomorrow. Please reconsult given change in POC or functional status.     Discharge Planner OT   Patient plan for discharge: home   Current status: Pt continues to be limited by SOB but much improved since yesterday and is steady on feet. Pt with good insight into limitations and able to verbalize and emply appropriate EC/FM strategies. Has good support at home as well. Pt provided with HEP but continued strength.   Barriers to return to prior living situation: medical status   Recommendations for discharge: home   Rationale for recommendations: Pt limited by SOB but otherwise IND.        Entered by: Ronit Darby 01/23/2020 1:42 PM

## 2020-01-23 NOTE — PROGRESS NOTES
"  Care Coordinator - Discharge Planning    Admission Date/Time:  1/20/2020  Attending MD:  Rose Conte MD     Data  Chart reviewed, discussed with interdisciplinary team.   Patient was admitted for:   1. Influenza B    2. COPD exacerbation (H)    3. History of left ventricular assist device (LVAD) (H)    4. Shortness of breath      Assessment   Concerns with insurance coverage for discharge needs: None.  Current Living Situation: Patient lives with spouse and 6 year old granddaughter.  Support System: Supportive and Involved  Services Involved: [facility] Monitoring  Transportation at Discharge: Family or friend will provide, pt states that his brother will come get him.  Transportation to Medical Appointments: Self  Barriers to Discharge: Medical plan of care    Coordination of Care and Referrals: This writer met with pt to discuss discharge planning. Per NP, pt medically ready for discharge tomorrow. Pt states that he is looking forward to getting home tomorrow. Pt worked with OT today and said he felt \"much better.\" OT signed off today as there are no acute OT/CR needs. Pt states that his wife does his LVAD dressing changes at home, but he his otherwise independent with his cares. Pt denies any discharge needs/concerns at this time.       Plan  Anticipated Discharge Date: 1/24/2020  Anticipated Discharge Plan: Discharge to home with clinic follow up as recommended  CC will continue to monitor patient's medical condition and progress towards discharge.  Rin Ceballos RN BSN  6C Unit Care Coordinator  Phone number: [phone]  Pager: [phone]    "

## 2020-01-23 NOTE — PROGRESS NOTES
[facility]   Cardiology II Service / Advanced Heart Failure  Daily Progress Note  Date of Service: 1/23/2020      Patient: Jim Willingham  MRN: [id number]  Admission Date: 1/20/2020  Hospital Day # 3    Assessment and Plan: Mr. Willingham is a 64 year old male with a past medical history including CKD Stage III, DM Type II, CECILIA, HTN, COPD, Asthma, and NICM with EF 20% s/p LVAD HM II 6/16/17 as DT due to obesity. His postoperative course was complicated by arrhythmias, WALTER, and left small pleural effusions     Influenza B with COPD Exacerbation. Positive 1/15/20. Completed course of Tamiflu. Chest X-ray negative for consolidation. Sputum positive for moderate H Influenza.   - Doxycycline 100 mg po BID for antiinflammatory effect for total of 7 days.   - Prednisone 40 mg po daily.   -  Xopenox nebs q6h.   - Transition Bumex to po 2 mg po BID.    - Continue prior to admission inhalers and Singulair.   - Blood cultures NTD.   - Repeat UC negative.      Chronic systolic heart failure secondary to NICM s/p LVAD HM II. Implanted 6/16/17.   Stage D, NYHA Class III  ACEi/ARB Losartan to 50 mg po daily   BB Toprol X 50 mg in AM and 25 mg at HS.   Aldosterone antagonist Aldactone 25 mg po BID   SCD prophylaxis CRT-D  Fluid status: Euvolemic. Bumex 2 mg po BID  Sleep Apnea Evaluation: CPAP  MAP: 75  LDH: 359 1/22  Anticoagulation: Coumadin per AC. INR-2.74. Goal INR 2-3.  Antiplatelet: ASA 81 mg po daily      The patient's HeartMate LVAD was interrogated 1/23/2020  * Speed 9400 rpm   * Pulsatility index 3.5  * Power 5.5 Manuel   * Flow 4.6 L/minute   Fluid status: Euvolemic  Alarms were reviewed, and negative for acute events.   The driveline exit site was covered with dressing clean, dry, and intact.   All external components were inspected and showed no evidence of damage or malfunction.    Paroxysmal Afib JERMAINE today.   - Baseline HR 88 with current rate 110-120's. Diuresis with Bumex.   - Continue Toprol XL 50 mg in  "AM and 25 mg at HS.   - Coumadin as above.      DM Type II, Uncontrolled. Elevated in setting of steroids. Metformin on hold inpatient.   - sliding scale insulin increased to high intensity with carb coverage.      CKD Stage III.   - Cr 1.27.    HTN  - MAP-75.     Chronic Medical Conditions:  Depression. Continue Celexa.   GERD. Continue Protonix.   Gout. Continue Allopurinol.   Obesity s/p Gastric Bypass with Chronic anemia.      FEN: 2 gram NA diet   PROPHY:  Coumadin. Protonix.   LINES: PIV  DISPO: Anticipate discharge to home in AM.   CODE STATUS:   Full Code   ================================================================  Interval History/ROS: He complains of persistent RESENDIZ with slow improvement and fatigue. He denies fever, chills, chest pain, palpitations, nausea, vomiting, diarrhea, PND, melena, hematochezia, hematemesis, and LE edema. He is tolerating oral intake and ambulation in his room.     Last 24 hr care team notes reviewed.   ROS:  4 point ROS including Respiratory, CV, GI and , other than that noted in the HPI, is negative.     Medications: Reviewed in EPIC.     Physical Exam:   /67 (BP Location: Left arm)   Pulse 70   Temp 97.6  F (36.4  C) (Axillary)   Resp 18   Ht 1.727 m (5' 8\")   Wt 113.9 kg (251 lb 3.2 oz)   SpO2 97%   BMI 38.19 kg/m    GENERAL: Appears alert and oriented times three.   HEENT: Eye symmetrical and free of discharge bilaterally. Mucous membranes moist and without lesions.  NECK: Supple and without lymphadenopathy. JVD 8-10 cm.   CV: RRR, S1S2 present with LVAD hum.   RESPIRATORY: Respirations regular, even, and unlabored. Lungs CTA throughout.   GI: Soft and non distended with normoactive bowel sounds present in all quadrants. No tenderness, rebound, guarding. No organomegaly.   EXTREMITIES: Trace bilateral LE peripheral edema. 2+ bilateral pedal pulses.   NEUROLOGIC: Alert and orientated x 3. CN II-XII grossly intact. No focal deficits.   MUSCULOSKELETAL: No " joint swelling or tenderness.   SKIN: No jaundice. No rashes or lesions. LVAD drive line covered.     Data:  CMP  Recent Labs   Lab 01/23/20  0633 01/22/20  0624 01/21/20  0639 01/21/20  0011    135 131* 131*   POTASSIUM 3.8 4.0 4.9 5.1   CHLORIDE 99 100 97 97   CO2 30 28 25 23   ANIONGAP 7 7 10 11   * 285* 399* 486*   BUN 53* 53* 45* 45*   CR 1.27* 1.15 1.19 1.28*   GFRESTIMATED 60* 68 65 59*   GFRESTBLACK 69 78 75 69   QAMAR 8.8 8.9 8.2* 8.1*   MAG 2.0 1.9 1.8 1.7   PROTTOTAL  --  6.5*  --  6.8   ALBUMIN  --  3.0*  --  3.2*   BILITOTAL  --  0.6  --  0.5   ALKPHOS  --  165*  --  185*   AST  --  22  --  36   ALT  --  65  --  91*     CBC  Recent Labs   Lab 01/22/20  0624 01/21/20  0639 01/21/20  0011 01/20/20  1759   WBC 8.0 7.8 9.1 9.7   RBC 4.00* 4.06* 3.98* 4.09*   HGB 11.4* 11.5* 11.3* 11.6*   HCT 36.4* 37.8* 37.3* 38.1*   MCV 91 93 94 93   MCH 28.5 28.3 28.4 28.4   MCHC 31.3* 30.4* 30.3* 30.4*   RDW 14.2 14.2 14.3 14.3    267 266 283     INR  Recent Labs   Lab 01/23/20  0633 01/22/20  0624 01/21/20  0639 01/21/20  0011   INR 2.74* 4.21* 4.61* 4.59*       Patient discussed with Dr. Conte.      Soraya Marshall [facility]  1/23/2020      I have seen and examined the patient as part of a shared visit with Soraya Marshall NP.  I agree with the note above. I reviewed today's vital signs and medications. I have reviewed and discussed with the advanced practice provider their physical examination, assessment, and plan   Briefly, influenza and COPD exacerbation in  LVAD supported patient  My key history/exam findings are: hemodynamically stable.  Grossly euvolemic on exam this afternoon.  The key management decisions made by me: feels better.  Transition of oral diuretics.  Plan to discharge tomorrow.    Rose Conte MD  Section Head - Advanced Heart Failure, Transplantation and Mechanical Circulatory Support  Director - Adult Congenital [facility] Cardiovascular [facility]  Associate Professor of Medicine,  [facility]

## 2020-01-24 ENCOUNTER — ANTICOAGULATION THERAPY VISIT (OUTPATIENT)
Dept: ANTICOAGULATION | Facility: CLINIC | Age: 65
End: 2020-01-24

## 2020-01-24 VITALS
HEIGHT: 68 IN | RESPIRATION RATE: 16 BRPM | OXYGEN SATURATION: 95 % | HEART RATE: 63 BPM | DIASTOLIC BLOOD PRESSURE: 40 MMHG | TEMPERATURE: 96.4 F | BODY MASS INDEX: 37.74 KG/M2 | WEIGHT: 249 LBS | SYSTOLIC BLOOD PRESSURE: 105 MMHG

## 2020-01-24 DIAGNOSIS — Z79.01 LONG TERM CURRENT USE OF ANTICOAGULANT THERAPY: ICD-10-CM

## 2020-01-24 DIAGNOSIS — Z95.811 LVAD (LEFT VENTRICULAR ASSIST DEVICE) PRESENT (H): ICD-10-CM

## 2020-01-24 LAB
ANION GAP SERPL CALCULATED.3IONS-SCNC: 6 MMOL/L (ref 3–14)
BUN SERPL-MCNC: 48 MG/DL (ref 7–30)
CALCIUM SERPL-MCNC: 8.9 MG/DL (ref 8.5–10.1)
CHLORIDE SERPL-SCNC: 100 MMOL/L (ref 94–109)
CO2 SERPL-SCNC: 32 MMOL/L (ref 20–32)
CREAT SERPL-MCNC: 1.2 MG/DL (ref 0.66–1.25)
GFR SERPL CREATININE-BSD FRML MDRD: 64 ML/MIN/{1.73_M2}
GLUCOSE BLDC GLUCOMTR-MCNC: 100 MG/DL (ref 70–99)
GLUCOSE BLDC GLUCOMTR-MCNC: 93 MG/DL (ref 70–99)
GLUCOSE SERPL-MCNC: 86 MG/DL (ref 70–99)
INR PPP: 1.98 (ref 0.86–1.14)
LDH SERPL L TO P-CCNC: 323 U/L (ref 85–227)
MAGNESIUM SERPL-MCNC: 2.3 MG/DL (ref 1.6–2.3)
POTASSIUM SERPL-SCNC: 3.7 MMOL/L (ref 3.4–5.3)
SODIUM SERPL-SCNC: 138 MMOL/L (ref 133–144)

## 2020-01-24 PROCEDURE — 83735 ASSAY OF MAGNESIUM: CPT | Performed by: INTERNAL MEDICINE

## 2020-01-24 PROCEDURE — 40000275 ZZH STATISTIC RCP TIME EA 10 MIN

## 2020-01-24 PROCEDURE — 25000132 ZZH RX MED GY IP 250 OP 250 PS 637: Performed by: NURSE PRACTITIONER

## 2020-01-24 PROCEDURE — 99239 HOSP IP/OBS DSCHRG MGMT >30: CPT | Performed by: NURSE PRACTITIONER

## 2020-01-24 PROCEDURE — 80048 BASIC METABOLIC PNL TOTAL CA: CPT | Performed by: INTERNAL MEDICINE

## 2020-01-24 PROCEDURE — 36415 COLL VENOUS BLD VENIPUNCTURE: CPT | Performed by: INTERNAL MEDICINE

## 2020-01-24 PROCEDURE — 94640 AIRWAY INHALATION TREATMENT: CPT

## 2020-01-24 PROCEDURE — 85610 PROTHROMBIN TIME: CPT | Performed by: INTERNAL MEDICINE

## 2020-01-24 PROCEDURE — 25000131 ZZH RX MED GY IP 250 OP 636 PS 637: Performed by: STUDENT IN AN ORGANIZED HEALTH CARE EDUCATION/TRAINING PROGRAM

## 2020-01-24 PROCEDURE — 25000128 H RX IP 250 OP 636: Performed by: NURSE PRACTITIONER

## 2020-01-24 PROCEDURE — 90682 RIV4 VACC RECOMBINANT DNA IM: CPT | Performed by: NURSE PRACTITIONER

## 2020-01-24 PROCEDURE — 83615 LACTATE (LD) (LDH) ENZYME: CPT | Performed by: INTERNAL MEDICINE

## 2020-01-24 PROCEDURE — 25000132 ZZH RX MED GY IP 250 OP 250 PS 637: Performed by: STUDENT IN AN ORGANIZED HEALTH CARE EDUCATION/TRAINING PROGRAM

## 2020-01-24 PROCEDURE — 25000125 ZZHC RX 250: Performed by: NURSE PRACTITIONER

## 2020-01-24 PROCEDURE — 00000146 ZZHCL STATISTIC GLUCOSE BY METER IP

## 2020-01-24 RX ORDER — BUMETANIDE 1 MG/1
2 TABLET ORAL 2 TIMES DAILY
Qty: 120 TABLET | Refills: 11 | Status: ON HOLD | OUTPATIENT
Start: 2020-01-24 | End: 2020-08-07

## 2020-01-24 RX ORDER — LEVALBUTEROL INHALATION SOLUTION 1.25 MG/3ML
1.25 SOLUTION RESPIRATORY (INHALATION) EVERY 6 HOURS PRN
Qty: 30 ML | Refills: 0 | Status: SHIPPED | OUTPATIENT
Start: 2020-01-24 | End: 2020-08-05

## 2020-01-24 RX ORDER — DOXYCYCLINE 100 MG/1
100 CAPSULE ORAL EVERY 12 HOURS
Qty: 8 CAPSULE | Refills: 0 | Status: SHIPPED | OUTPATIENT
Start: 2020-01-24 | End: 2020-02-13

## 2020-01-24 RX ORDER — WARFARIN SODIUM 10 MG/1
10 TABLET ORAL
Status: DISCONTINUED | OUTPATIENT
Start: 2020-01-24 | End: 2020-01-24 | Stop reason: HOSPADM

## 2020-01-24 RX ORDER — BENZONATATE 100 MG/1
100 CAPSULE ORAL 3 TIMES DAILY PRN
Qty: 30 CAPSULE | Refills: 0 | Status: SHIPPED | OUTPATIENT
Start: 2020-01-24 | End: 2020-02-13

## 2020-01-24 RX ORDER — BUMETANIDE 1 MG/1
1 TABLET ORAL
Status: DISCONTINUED | OUTPATIENT
Start: 2020-01-24 | End: 2020-01-24 | Stop reason: HOSPADM

## 2020-01-24 RX ORDER — BLOOD-GLUCOSE METER
EACH MISCELLANEOUS
Qty: 1 KIT | Refills: 0 | Status: SHIPPED | OUTPATIENT
Start: 2020-01-24 | End: 2022-05-24

## 2020-01-24 RX ORDER — PREDNISONE 20 MG/1
TABLET ORAL
Qty: 14 TABLET | Refills: 0 | Status: SHIPPED | OUTPATIENT
Start: 2020-01-24 | End: 2020-02-13

## 2020-01-24 RX ORDER — LOSARTAN POTASSIUM 25 MG/1
50 TABLET ORAL DAILY
Qty: 60 TABLET | Refills: 0 | Status: SHIPPED | OUTPATIENT
Start: 2020-01-24 | End: 2020-02-13

## 2020-01-24 RX ADMIN — LOSARTAN POTASSIUM 50 MG: 50 TABLET, FILM COATED ORAL at 08:49

## 2020-01-24 RX ADMIN — ASPIRIN 81 MG CHEWABLE TABLET 81 MG: 81 TABLET CHEWABLE at 08:49

## 2020-01-24 RX ADMIN — UMECLIDINIUM 1 PUFF: 62.5 AEROSOL, POWDER ORAL at 08:51

## 2020-01-24 RX ADMIN — BUMETANIDE 1 MG: 1 TABLET ORAL at 08:49

## 2020-01-24 RX ADMIN — LEVALBUTEROL HYDROCHLORIDE 1.25 MG: 1.25 SOLUTION RESPIRATORY (INHALATION) at 07:47

## 2020-01-24 RX ADMIN — SPIRONOLACTONE 25 MG: 25 TABLET ORAL at 08:50

## 2020-01-24 RX ADMIN — POTASSIUM CHLORIDE 20 MEQ: 750 TABLET, EXTENDED RELEASE ORAL at 08:49

## 2020-01-24 RX ADMIN — ALLOPURINOL 100 MG: 100 TABLET ORAL at 08:50

## 2020-01-24 RX ADMIN — DOXYCYCLINE 100 MG: 100 CAPSULE ORAL at 08:49

## 2020-01-24 RX ADMIN — LEVALBUTEROL HYDROCHLORIDE 1.25 MG: 1.25 SOLUTION RESPIRATORY (INHALATION) at 01:57

## 2020-01-24 RX ADMIN — CITALOPRAM HYDROBROMIDE 20 MG: 20 TABLET ORAL at 08:50

## 2020-01-24 RX ADMIN — FLUTICASONE FUROATE AND VILANTEROL TRIFENATATE 1 PUFF: 200; 25 POWDER RESPIRATORY (INHALATION) at 08:51

## 2020-01-24 RX ADMIN — GABAPENTIN 300 MG: 300 CAPSULE ORAL at 08:50

## 2020-01-24 RX ADMIN — PANTOPRAZOLE SODIUM 40 MG: 40 TABLET, DELAYED RELEASE ORAL at 08:50

## 2020-01-24 RX ADMIN — INFLUENZA A VIRUS A/BRISBANE/02/2018 (H1N1) RECOMBINANT HEMAGGLUTININ ANTIGEN, INFLUENZA A VIRUS A/KANSAS/14/2017 (H3N2) RECOMBINANT HEMAGGLUTININ ANTIGEN, INFLUENZA B VIRUS B/PHUKET/3073/2013 RECOMBINANT HEMAGGLUTININ ANTIGEN, AND INFLUENZA B VIRUS B/MARYLAND/15/2016 RECOMBINANT HEMAGGLUTININ ANTIGEN 0.5 ML: 45; 45; 45; 45 INJECTION INTRAMUSCULAR at 10:16

## 2020-01-24 RX ADMIN — PREDNISONE 40 MG: 20 TABLET ORAL at 08:49

## 2020-01-24 RX ADMIN — METOPROLOL SUCCINATE 50 MG: 50 TABLET, EXTENDED RELEASE ORAL at 08:50

## 2020-01-24 ASSESSMENT — MIFFLIN-ST. JEOR: SCORE: 1903.96

## 2020-01-24 ASSESSMENT — PAIN DESCRIPTION - DESCRIPTORS: DESCRIPTORS: ACHING

## 2020-01-24 ASSESSMENT — ACTIVITIES OF DAILY LIVING (ADL)
ADLS_ACUITY_SCORE: 18

## 2020-01-24 NOTE — PROGRESS NOTES
1/24/20    Dates of hospitalization: 1/20 to 1/24  Reason for hospitalization: You were admitted to the hospital for Influenza with a flare up of your underlying lung disease.  Procedures performed: Interrogated LVAD  Vitamin K or FFP administered? NO  INR Goal Range Confirmed to be:2.0-3.0  Inpatient warfarin doses added to calendar? yes  Medication changes at discharge: benzonatate (Tessalon Perles)  blood glucose monitoring  blood glucose (NO BRAND SPECIFIED)  doxycycline hyclate (VIBRAMYCIN)  insulin aspart (NovoLOG PEN)  insulin pen needle (32G X 4 MM)  levalbuterol (XOPENEX)  predniSONE (DELTASONE)  CHANGE how you take:  losartan (COZAAR)  STOP taking:  ipratropium - albuterol 0.5 mg/2.5 mg/3 mL 0.5-2.5  (3) MG/3ML neb solution (DUONEB)  Warfarin dosing after DC: 7.5mg  Patient discharged on Lovenox? no  Next INR date: 1/30  Where is the patient discharging to? (home, TCU, staying locally, etc.): home  Will patient have home care? no

## 2020-01-24 NOTE — PLAN OF CARE
Pt a/o x 4. VSS. Afebrile. Droplet precautions. Up independent. VAD #s WDL. Denied pain. Slept intermittently through the night. Discharge to home today.

## 2020-01-24 NOTE — PLAN OF CARE
"/40 (BP Location: Left arm)   Pulse 63   Temp 96.4  F (35.8  C) (Axillary)   Resp 16   Ht 1.727 m (5' 8\")   Wt 112.9 kg (249 lb)   SpO2 95%   BMI 37.86 kg/m      Pt discharging to home in the care of his wife. Pt has improved symptoms of the flu and fluid volume has been ameliorated.       Pt will be on insulin for the duration of the prednisone taper.  Discussed between pt and provider, pt has history of monitoring and administration of insulin.    Cards II NP primary for the day.  "

## 2020-01-24 NOTE — PLAN OF CARE
D/A/I:  Patient A&O x4, denied palpitations, dizziness, and nausea.  Had RESENDIZ, lung sounds intermittently diminished in bases, LVAD hum noted.  Had +1 edema in legs and feet, was given scheduled bumetanide.  Patient had good urine output, see I&O flowsheet.  In atrial fibrillation with BBB and occasional PVCs, HR 60s-120s, SBP 90s-100s, SaO2 95-97% on room air.  HeartMate 2 LVAD running at a fixed rate of 9400 rpm, no alarms during shift.  Flow 4.6-5.0, PI 3.5-5.1.  Drive line site clean and dry as seen through weekly transparent dressing, due for change 1/27.  Reported chronic shoulder pain that was managed with prn tramadol.  Ambulated in hallway with standby assist, was steady on his feet.    P:  Continue to monitor pain, VS, heart rhythm, LVAD function, drive line site integrity, fluid status, bowel status, cardiac and respiratory status.  Notify care team of changes in patient condition or other concerns.  Expected to discharge 1/24.

## 2020-01-24 NOTE — DISCHARGE SUMMARY
[facility]   Cardiology II Service / Advanced Heart Failure  Discharge Summary     Jim Willingham MRN# [id number]   YOB: 1955 Age: 64 year old     DATE OF ADMISSION:  1/20/2020  DATE OF DISCHARGE:  1/24/2020  ADMITTING PROVIDER:  Rose Conte MD  DISCHARGE PROVIDER:  Soraya Marshall FNP-C  PRIMARY PROVIDER:   Jovany Salas    ADMIT DIAGNOSES:   1. Influenza B with COPD Exacerbation     DISCHARGE DIAGNOSES:   1. Acute on Chronic Systolic Heart Failure s/p LVAD  2. Rapid Afib, Paroxysmal   3. CKD Stage III  4. HTN  5. Depression  6. GERD  7. Gout  8. Obesity s/p Gastric Bypass with Chronic Anemia    HPI: Please see the detailed H & P by Dr. Conte from 1/20/2020. Jim Willingham is a 64 year old male with PMH notable for NICM (EF 20%) s/p HeartMate II LVAD palcement (6/19/17) as destination therapy (obesity), COPD, type 2 DM, afib (on warfarin) who presented to the ED with acute onset of shortness of breath.      He was recently admitted 1/15-1/16 for SOB requiring bipap and was found to have influenza B. He was started on tamiflu and prednisone and oxygen saturations improved. He was discharged and instructed to complete 5 days of prednisone and tamiflu, which he did. Today he reports he never really felt better.HHe continues to have fatigue and myalgias. At rest his breathing is mildly labored, but he quickly becomes dyspneic when walking. He states that walking to the kitchen has become a daunting task because of how tired he is and how out of breath it makes him. He also continues to have a cough productive of greyish brown sputum. His sputum is thick, but he is able to cough it up. He has been using q4h duonebs and albuterol inhalers. He has not had much of an appetite and has been eating only vegetable soup from the can. Despite the BID bumex, he reports decreased urine output. No chest pain. No alarms with LVAD.    PHYSICAL EXAM:  Blood pressure 105/40, pulse 63,  "temperature 96.4  F (35.8  C), temperature source Axillary, resp. rate 16, height 1.727 m (5' 8\"), weight 112.9 kg (249 lb), SpO2 95 %.  GENERAL: Appears alert and oriented times three.   HEENT: Eye symmetrical and free of discharge bilaterally. Mucous membranes moist and without lesions.  NECK: Supple and without lymphadenopathy. JVD 8 cm  CV: RRR, S1S2 present with LVAD hum   RESPIRATORY: Respirations regular, even, and unlabored. Lungs CTA throughout.   GI: Soft and non distended with normoactive bowel sounds present in all quadrants. No tenderness, rebound, guarding. No organomegaly.   EXTREMITIES: No peripheral edema. 2+ bilateral pedal pulses.   NEUROLOGIC: Alert and orientated x 3. CN II-XII grossly intact. No focal deficits.   MUSCULOSKELETAL: No joint swelling or tenderness.   SKIN: No jaundice. No rashes or lesions. LVAD drive line covered    LABS:   Last CBC:   Recent Labs   Lab Test 01/22/20  0624   WBC 8.0   RBC 4.00*   HGB 11.4*   HCT 36.4*   MCV 91   MCH 28.5   MCHC 31.3*   RDW 14.2          Last CMP:  Recent Labs   Lab Test 01/24/20  0621  01/22/20  0624      < > 135   POTASSIUM 3.7   < > 4.0   CHLORIDE 100   < > 100   QAMAR 8.9   < > 8.9   CO2 32   < > 28   BUN 48*   < > 53*   CR 1.20   < > 1.15   GLC 86   < > 285*   AST  --   --  22   ALT  --   --  65   BILITOTAL  --   --  0.6   ALBUMIN  --   --  3.0*   PROTTOTAL  --   --  6.5*   ALKPHOS  --   --  165*    < > = values in this interval not displayed.       IMAGING:  Results for orders placed or performed during the hospital encounter of 01/20/20   XR Chest 2 Views    Narrative    XR CHEST 2 VW  1/20/2020 7:00 PM      HISTORY: dyspnea, recent influenza B    COMPARISON: Chest x-ray 1/15/2020.    FINDINGS:   PA and lateral views of the chest. Postsurgical changes of the chest.  Median sternotomy wires are intact. Cardiac defibrillator projecting  over the left hemithorax with leads in stable position. LVAD projects  over the left pericardial " space. Stable, enlarged cardiac silhouette.  Trachea is midline. Pulmonary vasculature is prominent with improved  interstitial lung opacities. No pleural effusion or pneumothorax  appreciated. No acute osseous abnormalities. Visualized upper abdomen  is unremarkable.      Impression    IMPRESSION:   Stable, mild cardiomegaly with improved pulmonary vascular congestion.  No focal consolidation appreciated.    I have personally reviewed the examination and initial interpretation  and I agree with the findings.    THOMAS OSULLIVAN MD   Echocardiogram Limited    Narrative    [id number]  [id number]  [id number]  [id number]^DARLING^CINDI^DANYEL           [facility],[place]  Echocardiography Laboratory  [address] [place] [id number]     Name: LOIS FULLER  MRN: [id number]  : 1955  Study Date: 2020 08:54 AM  Age: 64 yrs  Gender: Male  Patient Location: [facility]  Reason For Study: CHF  Ordering Physician: CINDI HASTINGS  Performed By: Delmi Darby RDCS     BSA: 2.3 m2  Height: 68 in  Weight: 258 lb  HR: 128  BP: 101/92 mmHg  _____________________________________________________________________________  __        Procedure  Limited Portable Echo Adult. LVAD Echocardiogram with two-dimensional, color  and spectral Doppler performed.  _____________________________________________________________________________  __        Interpretation Summary  Technically difficult study. Patients heart rate is in the 120s-150s during  the study.  HM2 LVAD is present and is funcioning at 9400RPM     Severely (EF 10-15%) reduced left ventricular function is present. Severe left  ventricular dilation is present. LVIDd is 6.6cm LVAD cannula in the apex is  not well visualized. Inflow and outflow velocities could not be obtained.  Aortic valve appears to open minimally with each beat. Septum is probably  midline but again difficult to visualize.  Global right ventricular function is moderately to  severely reduced.  IVC diameter >2.1 cm collapsing <50% with sniff suggests a high RA pressure  estimated at 15 mmHg or greater. No pericardial effusion is present.     Compared to prior study on 1/16/20, LVIDd appears slightly larger. Patient is  severely tachycardic now. IVC appears more dilated.  _____________________________________________________________________________  __        Left Ventricle  Severe left ventricular dilation is present. Diastolic function not assessed  due to tachycardia. The Ejection Fraction is estimated at 10-15%. Regional  wall motion cannot be assessed.     Right Ventricle  Global right ventricular function is moderately to severely reduced. A  pacemaker lead is noted in the right ventricle.     Atria  The atria cannot be assessed.     Mitral Valve  The mitral valve cannot be assessed.        Tricuspid Valve  The tricuspid valve is normal.     Pulmonic Valve  The pulmonic valve cannot be assessed.     Vessels  The aorta root is normal. IVC diameter >2.1 cm collapsing <50% with sniff  suggests a high RA pressure estimated at 15 mmHg or greater.     Pericardium  No pericardial effusion is present.     Compared to Previous Study  Compared to prior study on 1/16/20, LVIDd appears slightly larger. Patient is  severely tachycardic now. IVC appears more dilated.     _____________________________________________________________________________  __  MMode/2D Measurements & Calculations  LVIDd: 6.6 cm              _____________________________________________________________________________  __        Report approved by: MIKI Benavides 01/21/2020 10:10 AM      Cardiac Device Check - Inpatient     Value    Date Time Interrogation Session [id number]    Implantable Pulse Generator  Medtronic    Implantable Pulse Generator Model [id number] Viva XT CRT-D    Implantable Pulse Generator Serial Number [id number]    Type Interrogation Session In Clinic    Clinic Name [facility]  Heart Care    Implantable Pulse Generator Type Cardiac Resynchronization Therapy - Defibrillator    Implantable Pulse Generator Implant Date 20130906    Implantable Lead  Medtronic    Implantable Lead Model 4193 Attain OTW    Implantable Lead Serial Number [id number]    Implantable Lead Implant Date 20080306    Implantable Lead Polarity Type Unipolar Lead    Implantable Lead Location Detail 1 UNKNOWN    Implantable Lead Location Left Ventricle    Implantable Lead  Medtronic    Implantable Lead Model 5076 CapSureFix Novus MRI SureScan    Implantable Lead Serial Number [id number]    Implantable Lead Implant Date 20080306    Implantable Lead Polarity Type Bipolar Lead    Implantable Lead Location Detail 1 UNKNOWN    Implantable Lead Location Right Atrium    Implantable Lead  Medtronic    Implantable Lead Model 6947M Sprint Quattro Secure MRI SureScan    Implantable Lead Serial Number [id number]    Implantable Lead Implant Date 20080306    Implantable Lead Polarity Type Quadripolar Lead    Implantable Lead Location Detail 1 UNKNOWN    Implantable Lead Location Right Ventricle    Krishan Setting Mode (NBG Code) DDDR    Krishan Setting Lower Rate Limit 50    Krishan Setting Maximum Tracking Rate 130    Krishan Setting Maximum Sensor Rate 130    Krishan Setting LUZ MARINA Delay Low 110    Krishan Setting PAV Delay Low 150    Krishan Setting AT Mode Switch Rate 150    Lead Channel Setting Sensing Polarity Bipolar    Lead Channel Setting Sensing Anode Location Right Atrium    Lead Channel Setting Sensing Anode Terminal Ring    Lead Channel Setting Sensing Cathode Location Right Atrium    Lead Channel Setting Sensing Cathode Terminal Tip    Lead Channel Setting Sensing Sensitivity 0.45    Lead Channel Setting Sensing Polarity Bipolar    Lead Channel Setting Sensing Anode Location Right Ventricle    Lead Channel Setting Sensing Anode Terminal Ring    Lead Channel Setting Sensing Cathode Location Right Ventricle     Lead Channel Setting Sensing Cathode Terminal Tip    Lead Channel Setting Sensing Sensitivity 0.3    Ventricular chambers paced during CRT pacing. BiV    CRT LV-RV Delay 0    Lead Channel Setting Pacing Polarity Bipolar    Lead Channel Setting Pacing Anode Location Right Atrium    Lead Channel Setting Pacing Anode Terminal Ring    Lead Channel Setting Sensing Cathode Location Right Atrium    Lead Channel Setting Sensing Cathode Terminal Tip    Lead Channel Setting Pacing Pulse Width 0.4    Lead Channel Setting Pacing Amplitude 2.5    Lead Channel Setting Pacing Capture Mode Adaptive    Lead Channel Setting Pacing Polarity Bipolar    Lead Channel Setting Pacing Anode Location Right Ventricle    Lead Channel Setting Pacing Anode Terminal Ring    Lead Channel Setting Sensing Cathode Location Right Ventricle    Lead Channel Setting Sensing Cathode Terminal Tip    Lead Channel Setting Pacing Pulse Width 0.4    Lead Channel Setting Pacing Amplitude 2.25    Lead Channel Setting Pacing Capture Mode Adaptive    Lead Channel Setting Pacing Polarity Bipolar    Lead Channel Setting Pacing Anode Location Right Ventricle    Lead Channel Setting Pacing Anode Terminal Coil    Lead Channel Setting Sensing Cathode Location Left Ventricle    Lead Channel Setting Sensing Cathode Terminal Tip    Lead Channel Setting Pacing Pulse Width 0.4    Lead Channel Setting Pacing Amplitude 1.75    Lead Channel Setting Pacing Capture Mode Adaptive    Zone Setting Type Category VF    Zone Setting Detection Interval 320    Zone Setting Detection Beats Numerator 30    Zone Setting Detection Beats Denominator 40    Zone Setting Type Category VT    Zone Setting Detection Interval 240    Zone Setting Type Category VT    Zone Setting Detection Interval 360    Zone Setting Type Category VT    Zone Setting Detection Interval 400    Zone Setting Type Category ATRIAL_FIBRILLATION    Zone Setting Type Category AT/AF    Zone Setting Detection Interval 400     Lead Channel Impedance Value 456    Lead Channel Sensing Intrinsic Amplitude 4.125    Lead Channel Sensing Intrinsic Amplitude 1    Lead Channel Pacing Threshold Amplitude 1.25    Lead Channel Pacing Threshold Pulse Width 0.4    Lead Channel Impedance Value 380    Lead Channel Impedance Value 266    Lead Channel Sensing Intrinsic Amplitude 6.75    Lead Channel Sensing Intrinsic Amplitude 6.75    Lead Channel Pacing Threshold Amplitude 1.125    Lead Channel Pacing Threshold Pulse Width 0.4    Lead Channel Impedance Value 4,047    Lead Channel Impedance Value 437    Lead Channel Impedance Value 4,047    Lead Channel Pacing Threshold Amplitude 0.625    Lead Channel Pacing Threshold Pulse Width 0.4    Battery Date Time of Measurements 20200121170040    Battery Status OK    Battery RRT Trigger 2.727    Battery Remaining Longevity 26    Battery Voltage 2.84    Capacitor Charge Type Reformation    Capacitor Last Charge Date Time 20191123022921    Capacitor Charge Time 4.284    Capacitor Charge Energy 18    Krishan Statistic Date Time Start 20200116154039    Krishan Statistic Date Time End 20200121165715    Krishan Statistic RA Percent Paced 1.21    Krishan Statistic RV Percent Paced 28.63    CRT Statistic LV Percent Paced 28.20    Krishan Statistic AP  Percent 1.24    Krishan Statistic AS  Percent 24.64    Krishan Statistic AP VS Percent 0.52    Krishan Statistic AS VS Percent 73.61    CRT Statistic Date Time Start 20200116154039    CRT Statistic Date Time End 20200121165715    CRT Statistic CRT Percent Paced 28.20    Atrial Tachy Statistic Date Time Start 20200116154039    Atrial Tachy Statistic Date Time End 20200121165715    Atrial Tachy Statistic AT/AF [place] Percent 100    Therapy Statistic Recent Shocks Delivered 0    Therapy Statistic Recent Shocks Aborted 1    Therapy Statistic Recent ATP Delivered 0    Therapy Statistic Recent Date Time Start 20200116154039    Therapy Statistic Recent Date Time End 20200121165715    Therapy  Statistic Total Shocks Delivered 2    Therapy Statistic Total Shocks Aborted 2    Therapy Statistic Total ATP Delivered 13    Therapy Statistic Total  Date Time Start 63622074787065    Therapy Statistic Total  Date Time End 15260378919944    Episode Statistic Recent Count 4    Episode Statistic Type Category AT/AF    Episode Statistic Recent Count 2    Episode Statistic Type Category SVT    Episode Statistic Recent Count 0    Episode Statistic Type Category VT    Episode Statistic Recent Count 1    Episode Statistic Type Category VF    Episode Statistic Recent Count 0    Episode Statistic Type Category VT    Episode Statistic Recent Count 0    Episode Statistic Type Category VT    Episode Statistic Recent Count 1    Episode Statistic Type Category VT    Episode Statistic Recent Date Time Start 60055454175930    Episode Statistic Recent Date Time End 52417303785544    Episode Statistic Recent Date Time Start 69865111611821    Episode Statistic Recent Date Time End 45298623555311    Episode Statistic Recent Date Time Start 57472820471725    Episode Statistic Recent Date Time End 62202089936938    Episode Statistic Recent Date Time Start 00354363753184    Episode Statistic Recent Date Time End 76029893810383    Episode Statistic Recent Date Time Start 01619540562308    Episode Statistic Recent Date Time End 64430356474973    Episode Statistic Recent Date Time Start 30271596809453    Episode Statistic Recent Date Time End 34762323611678    Episode Statistic Recent Date Time Start 99666223249393    Episode Statistic Recent Date Time End 65007760850841    Episode Statistic Total Count 17    Episode Statistic Type Category AT/AF    Episode Statistic Total Count 2    Episode Statistic Type Category SVT    Episode Statistic Total Count 182    Episode Statistic Type Category VT    Episode Statistic Total Count 15    Episode Statistic Type Category VF    Episode Statistic Total Count 0    Episode Statistic Type Category VT     Episode Statistic Total Count 0    Episode Statistic Type Category VT    Episode Statistic Total Count 5    Episode Statistic Type Category VT    Episode Statistic Total Date Time Start 69870120217008    Episode Statistic Total Date Time End 90606062834590    Episode Statistic Total Date Time Start 42504873598857    Episode Statistic Total Date Time End 28295418757198    Episode Statistic Total Date Time Start 38776682435075    Episode Statistic Total Date Time End 17428519383441    Episode Statistic Total Date Time Start 27193579046299    Episode Statistic Total Date Time End 96823135900337    Episode Statistic Total Date Time Start 93931486010260    Episode Statistic Total Date Time End 97234828722108    Episode Statistic Total Date Time Start 21199406940166    Episode Statistic Total Date Time End 72552537046911    Episode Statistic Total Date Time Start 95029944576048    Episode Statistic Total Date Time End 43502792643742    Episode Identifier 316    Episode Type Category VF    Episode Date Time [id number]    Episode Duration 12    Episode Identifier 311    Episode Type Category VT1_MONITOR    Episode Date Time 09688476376479    Episode Duration 6,316    Episode Identifier 318    Episode Type Category SVT    Episode Date Time 71005380044243    Episode Duration 28    Episode Identifier 317    Episode Type Category SVT    Episode Date Time [id number]    Episode Duration 16    Episode Identifier 310    Episode Type Category SVT    Episode Date Time [id number]    Episode Duration 742    Episode Identifier 309    Episode Type Category SVT    Episode Date Time [id number]    Episode Duration 319    Episode Identifier 308    Episode Type Category SVT    Episode Date Time 22412535028636    Episode Duration 237    Episode Identifier 315    Episode Type Category LFP    Episode Date Time 02785802716107    Episode Duration 1    Episode Identifier 312    Episode Type Category LFP    Episode Date Time  42089290110471    Episode Duration 1    Episode Identifier 314    Episode Type Category Monitor    Episode Date Time 27489843761767    Episode Identifier 313    Episode Type Category Monitor    Episode Date Time [id number]    Episode Duration 760    Episode Identifier 11,607    Episode Type Category VSE    Episode Date Time 77527525975527    Episode Duration 8    Episode Identifier 11,606    Episode Type Category VSE    Episode Date Time 07857627883219    Episode Duration 32    Episode Identifier 11,605    Episode Type Category VSE    Episode Date Time 23657250968474    Episode Duration 17    Episode Identifier 11,604    Episode Type Category VSE    Episode Date Time [id number]    Episode Duration 35    Episode Identifier 11,603    Episode Type Category VSE    Episode Date Time [id number]    Episode Duration 21    Episode Identifier 11,602    Episode Type Category VSE    Episode Date Time [id number]    Episode Duration 17    Episode Identifier 11,601    Episode Type Category VSE    Episode Date Time [id number]    Episode Duration 29    Episode Identifier 11,251    Episode Type Category VSE    Episode Date Time [id number]    Episode Duration 11,516    Narrative    Patient seen on 6 D for evaluation and iterative programming of a   Medtronic Bi-Ventricular ICD per MD orders. Normal ICD function. 1 episode   recorded in the VF zone lasting 10 seconds @ 333 bpm, the device charges   but aborts the shock due to spontaneous break in the rhythm to AF w/ VS @   176 bpm. 7 HVR episodes recorded 2 EGM display AF w/ RVR with runs of NSVT   lasting 11-27 seconds @ 250-272 bpm. AF [place] = 100%. Pt is on coumadin.    Intrinsic rhythm = AF w/ RVR, Ventricular rates between 120-160 bpm. AP =   1.2%. CRTP = 28.6%. VSRP = 23.4%. OptiVol fluid index is >200, and has   been on the rise since Mid December. Estimated battery longevity to MARVA =   2.1 years. 1 short v-v intervals recorded. Lead trends appear  stable.   Threshold testing deferred due to patients increased HR's. SARA Marshall NP   at pt's bedside and notified of increased HR's and decrease in BVP%. EDUARDO Cain RN, BSN.     Multi lead ICD    I have reviewed and interpreted the device interrogation, settings,   programming and nurse's summary. The device is functioning within normal   device parameters. I agree with the current findings, assessment and plan.     HOSPITAL COURSE:   Influenza B with COPD Exacerbation. Positive for Influenza B 1/15/20. He presented for readmission due to progressively shortness of breath. He completed course of Tamiflu prior to admission. Chest X-ray negative for consolidation. Sputum positive for moderate H Influenza. Doxycycline 100 mg po BID initiated for antiinflammatory effect for total of 7 days. He was continued on Prednisone 40 mg po daily. He underwent diuresis with IV Bumex. Mild improvement with SOB on Xopenox nebs q6h. Continue prior to admission inhalers and Singulair. Blood cultures NTD. Repeat UC negative. Shortness of breath improved and RESENDIZ improved prior to discharge. He will discharge to home on Doxycyline to complete 7 days, Prednisone taper, Xopenox nebs, and Tessalon Pearls prn.      Acute on Chronic systolic heart failure secondary to NICM s/p LVAD HM II. Implanted 6/16/17.   Stage D, NYHA Class III  ACEi/ARB Losartan to 50 mg po daily   BB Toprol X 50 mg in AM and 25 mg at HS.   Aldosterone antagonist Aldactone 25 mg po BID   SCD prophylaxis CRT-D  Fluid status: mild hypovolemia this AM. He will take decreased dose of Bumex this AM at 1 mg this AM and resume 2 mg po BID thereafter.   Sleep Apnea Evaluation: CPAP  MAP: 72.5  LDH: 359 1/22  Anticoagulation: Coumadin per AC. INR-1.98. Goal INR 2-3. He will discharge to home on Coumadin 7.5 mg po daily with repeat INR on Monday.   Antiplatelet: ASA 81 mg po daily     Paroxysmal Afib, Rapid resolved prior to discharge. Baseline HR 88 with rate on admission.  110-120's. Rates improved with diuresis with Bumex. Continue Toprol XL 50 mg in AM and 25 mg at HS. Coumadin as above.      DM Type II, Uncontrolled. Elevated in setting of steroids. Metformin on hold inpatient. Sliding scale insulin and to carb coverage inpatient. He will discharge to home on Medium intensity sliding scale insulin and resumption of Metformin. Pt assured me he is comfortable doing QID accuchecks and subcutaneous insulin at home.      CKD Stage III. Cr 1.20 at time of discharge.     HTN. MAP-72.5. Losartan increased to 50 mg po daily throughout hospitalization.      Chronic Medical Conditions:  Depression. Continue Celexa.   GERD. Continue Protonix.   Gout. Continue Allopurinol.   Obesity s/p Gastric Bypass with Chronic anemia.       DISCHARGE MEDICATIONS:  Discharge Medication List as of 1/24/2020  9:53 AM      START taking these medications    Details   benzonatate (TESSALON PERLES) 100 MG capsule Take 1 capsule (100 mg) by mouth 3 times daily as needed for cough, Disp-30 capsule, R-0, E-Prescribe      blood glucose (NO BRAND SPECIFIED) test strip Use to test blood sugar four times daily or as directed., Disp-100 each, R-0, E-Prescribe      blood glucose monitoring (ONE TOUCH ULTRA 2) meter device kit Use to test blood sugar four times daily or as directed.Disp-1 kit, [id number]      !! insulin aspart (NOVOLOG PEN) 100 UNIT/ML pen Inject 1-7 Units Subcutaneous 3 times daily (before meals) for 11 days, Disp-3 mL, R-0, E-Prescribe      !! insulin aspart (NOVOLOG PEN) 100 UNIT/ML pen Inject 1-5 Units Subcutaneous At Bedtime, HistoricalContinue while on oral steroids.      insulin pen needle (32G X 4 MM) 32G X 4 MM miscellaneous Use four pen needles daily or as directed.Disp-110 each, [id number]      predniSONE (DELTASONE) 20 MG tablet 40 mg po daily for 2 days, 30 mg po daily for 4 days, then 20 mg po daily for 3 days, then 10 mg po daily for 2 days, then discontinue, Disp-14 tablet, R-0,  E-Prescribe       !! - Potential duplicate medications found. Please discuss with provider.      CONTINUE these medications which have CHANGED    Details   bumetanide (BUMEX) 1 MG tablet Take 2 tablets (2 mg) by mouth 2 times daily, Disp-120 tablet, R-11, E-PrescribeStart tonight      doxycycline hyclate (VIBRAMYCIN) 100 MG capsule Take 1 capsule (100 mg) by mouth every 12 hours, Disp-8 capsule, R-0, E-Prescribe      levalbuterol (XOPENEX) 1.25 MG/3ML neb solution Take 3 mLs (1.25 mg) by nebulization every 6 hours as needed for shortness of breath / dyspnea or wheezing, Disp-30 mL, R-0, E-Prescribe      losartan (COZAAR) 25 MG tablet Take 2 tablets (50 mg) by mouth daily, Disp-60 tablet, R-0, E-Prescribe         CONTINUE these medications which have NOT CHANGED    Details   acetaminophen (TYLENOL) 325 MG tablet Take 650 mg by mouth every 6 hours as needed for mild pain, Historical      albuterol (ALBUTEROL) 108 (90 BASE) MCG/ACT Inhaler Inhale 2 puffs into the lungs every 4 hours as needed for shortness of breath / dyspnea or wheezing, Historical      allopurinol (ZYLOPRIM) 100 MG tablet Take 1 tablet (100 mg) by mouth daily, Disp-60 tablet, R-5, E-Prescribe      aspirin 81 MG chewable tablet 1 tablet (81 mg) by Oral or Feeding Tube route daily, Disp-36 tablet, OTC      citalopram (CELEXA) 20 MG tablet Take 20 mg by mouth daily, Historical      cyanocobalamin (VITAMIN B12) 1000 MCG/ML injection Inject 1,000 mcg into the muscle every 30 days, Historical      fluticasone-vilanterol (BREO ELLIPTA) 200-25 MCG/INH oral inhaler Inhale 1 puff into the lungs daily, Historical      gabapentin (NEURONTIN) 300 MG capsule Take 1 capsule (300 mg) by mouth twice daily and 2 capsules (600 mg) by mouth at bedtime daily., Historical      metFORMIN (GLUCOPHAGE) 500 MG tablet Take 1 tablet (500 mg) by mouth 2 times daily (with meals), Disp-60 tablet, R-0, Historical      metoprolol succinate ER (TOPROL-XL) 25 MG 24 hr tablet Take 50mg  in the morning and 25mg in the evening, Disp-270 tablet, R-2, E-Prescribe      montelukast (SINGULAIR) 10 MG tablet Take 10 mg by mouth At Bedtime, Historical      pantoprazole (PROTONIX) 40 MG EC tablet Take 1 tablet (40 mg) by mouth every morning, Disp-60 tablet, R-5, E-Prescribe      potassium chloride (K-TAB,KLOR-CON) 10 MEQ tablet Take 2 tablets (20 mEq) by mouth daily, Disp-120 tablet, R-5, E-Prescribe      spironolactone (ALDACTONE) 25 MG tablet Take 25 mg by mouth 2 times daily, Historical      traMADol (ULTRAM) 50 MG tablet Take 2 tablets (100 mg) by mouth every 6 hours as needed for moderate pain or breakthrough pain, Disp-28 tablet, Transitional      umeclidinium (INCRUSE ELLIPTA) 62.5 MCG/INH oral inhaler Inhale 1 puff into the lungs daily, Historical      warfarin (COUMADIN) 5 MG tablet Take 5 - 7.5mg daily or as directed by coumadin clinic., Disp-90 tablet, R-5, E-Prescribe      zinc sulfate (ZINCATE) 220 (50 ZN) MG capsule Take 220 mg by mouth 2 times daily, Historical         STOP taking these medications       ipratropium - albuterol 0.5 mg/2.5 mg/3 mL (DUONEB) 0.5-2.5 (3) MG/3ML neb solution Comments:   Reason for Stopping:               DISCHARGE DISPOSITION: Jim Willingham will discharge to home in stable condition.     DISCHARGE INSTRUCTIONS:  Discharge Procedure Orders   Medication Therapy Management Referral   Referral Priority: Routine Referral Type: Med Therapy Management   Requested Specialty: Pharmacist   Number of Visits Requested: 1     Reason for your hospital stay   Order Comments: You were admitted to the hospital for Influenza with a flare up of your underlying lung disease. Notify your Cardiology team for fever, chills worsening shortness of breath, worsening cough, increased swelling in your feet, and weight gain of 3 lbs over night and 5 lbs in a week.     Activity   Order Comments: Your activity upon discharge: as tolerated     Order Specific Question Answer Comments   Is  discharge order? Yes      Wound care and dressings   Order Comments: Instructions to care for your wound at home: LVAD drive line dressing change as prior to admission.     Adult [facility] Follow-up and recommended labs and tests   Order Comments: Follow up with Melisa as scheduled 2/13/20 as scheduled.   Repeat BMP and INR on Monday 1/27/20.     Appointments on [place] and/or [facility] (with [facility] or [facility] provider or service). Call [phone] if you haven't heard regarding these appointments within 7 days of discharge.     Full Code     Order Specific Question Answer Comments   Code status determined by: Discussion with patient/legal decision maker      Diet   Order Comments: Follow this diet upon discharge: 2 gram NA diet     Order Specific Question Answer Comments   Is discharge order? Yes        45 minutes spent in discharge, including >50% in counseling and coordination of care, medication review and plan of care recommended on follow up. Please do not hesitate to contact me should there be questions regarding the hospital course or discharge plan.      TIM Sood CNP FNP-C  1/24/2020  [phone]

## 2020-01-26 LAB
BACTERIA SPEC CULT: ABNORMAL
BACTERIA SPEC CULT: NO GROWTH
BACTERIA SPEC CULT: NO GROWTH
SPECIMEN SOURCE: ABNORMAL
SPECIMEN SOURCE: NORMAL
SPECIMEN SOURCE: NORMAL

## 2020-01-27 ENCOUNTER — PATIENT OUTREACH (OUTPATIENT)
Dept: CARE COORDINATION | Facility: CLINIC | Age: 65
End: 2020-01-27

## 2020-01-28 ENCOUNTER — CARE COORDINATION (OUTPATIENT)
Dept: CARDIOLOGY | Facility: CLINIC | Age: 65
End: 2020-01-28

## 2020-01-28 DIAGNOSIS — E11.43 TYPE 2 DIABETES MELLITUS WITH DIABETIC AUTONOMIC NEUROPATHY, WITHOUT LONG-TERM CURRENT USE OF INSULIN (H): Primary | ICD-10-CM

## 2020-01-28 RX ORDER — INSULIN LISPRO 100 [IU]/ML
1-7 INJECTION, SOLUTION INTRAVENOUS; SUBCUTANEOUS 4 TIMES DAILY
Qty: 3 ML | Refills: 1 | Status: ON HOLD | OUTPATIENT
Start: 2020-01-28 | End: 2020-08-07

## 2020-01-28 NOTE — PROGRESS NOTES
Called pt to check in after discharge from hospital. Pt reports breathing is improving and he has not required a neb treatment in a few days. He also reports dizziness and weakness since discharge from hospital, and attributes this to an increase in Losartan from 37.5mg BID to 50mg BID. He titrated dose down this am to 25mg and reports feeling less dizziness and has slightly better endurance. He is planning to get labs drawn on Thursday. Pt noted that he has not been able to get his novolog pen that was ordered on discharge - his pharmacy told him that it required a prior auth. BG's running 250-300.    Called Soraya Marshall NP, to discuss. Received order to reduce Losartan back to 37.5mg BID and for pt to call back if not tolerating dose.   Called pt's pharmacy to enquire about issue with Novolog. They sent a prior auth request to pt's insurance on 1/24, and request was denied on 1/24 as novolog is not on formulary. Our team was not notified of this. Spoke with Soraya again, who submitted new order for humalog pens instead.   Called pt back to review new prescription for Humalog. He will go pick it up today. Instructed pt to call back with any further issues.

## 2020-01-29 ENCOUNTER — CARE COORDINATION (OUTPATIENT)
Dept: CARDIOLOGY | Facility: CLINIC | Age: 65
End: 2020-01-29

## 2020-01-29 NOTE — PROGRESS NOTES
Called pt this am to follow-up on med changes from yesterday. Pt reports he took Losartan 37.5 last night and this am. He is still feeling dizzy, but less so than when he was taking 50mg BID. He also was able to get the insulin pen last night and start dosing with sliding scale instructions. BG this am was 220. Instructed pt to call or page if dizziness doesn't subside or is not tolerable. Pt verbalized understanding.

## 2020-01-30 ENCOUNTER — ANTICOAGULATION THERAPY VISIT (OUTPATIENT)
Dept: ANTICOAGULATION | Facility: CLINIC | Age: 65
End: 2020-01-30

## 2020-01-30 DIAGNOSIS — Z79.01 LONG TERM CURRENT USE OF ANTICOAGULANT THERAPY: ICD-10-CM

## 2020-01-30 DIAGNOSIS — I50.22 CHRONIC SYSTOLIC CONGESTIVE HEART FAILURE (H): ICD-10-CM

## 2020-01-30 DIAGNOSIS — J44.1 COPD EXACERBATION (H): ICD-10-CM

## 2020-01-30 DIAGNOSIS — Z95.811 LVAD (LEFT VENTRICULAR ASSIST DEVICE) PRESENT (H): ICD-10-CM

## 2020-01-30 DIAGNOSIS — I50.22 CHRONIC SYSTOLIC CONGESTIVE HEART FAILURE (H): Primary | ICD-10-CM

## 2020-01-30 LAB
ANION GAP SERPL CALCULATED.3IONS-SCNC: 8 MMOL/L (ref 3–14)
BUN SERPL-MCNC: 49 MG/DL (ref 7–30)
CALCIUM SERPL-MCNC: 9.1 MG/DL (ref 8.5–10.1)
CHLORIDE SERPL-SCNC: 96 MMOL/L (ref 94–109)
CO2 SERPL-SCNC: 29 MMOL/L (ref 20–32)
CREAT SERPL-MCNC: 1.62 MG/DL (ref 0.66–1.25)
GFR SERPL CREATININE-BSD FRML MDRD: 45 ML/MIN/{1.73_M2}
GLUCOSE SERPL-MCNC: 145 MG/DL (ref 70–99)
INR PPP: 2.97 (ref 0.86–1.14)
POTASSIUM SERPL-SCNC: 4.3 MMOL/L (ref 3.4–5.3)
SODIUM SERPL-SCNC: 133 MMOL/L (ref 133–144)

## 2020-01-30 PROCEDURE — 80048 BASIC METABOLIC PNL TOTAL CA: CPT | Performed by: NURSE PRACTITIONER

## 2020-01-30 PROCEDURE — 85610 PROTHROMBIN TIME: CPT | Performed by: INTERNAL MEDICINE

## 2020-01-30 PROCEDURE — 36415 COLL VENOUS BLD VENIPUNCTURE: CPT | Performed by: INTERNAL MEDICINE

## 2020-01-30 NOTE — PROGRESS NOTES
ANTICOAGULATION FOLLOW-UP CLINIC VISIT    Patient Name:  Jim Willingham  Date:  2020  Contact Type:  Telephone    SUBJECTIVE:  Patient Findings     Positives:   Change in medications    Comments:   Doxycycline is finished and Prednisone continues for another 4-5 days.          Clinical Outcomes     Comments:   Doxycycline is finished and Prednisone continues for another 4-5 days.             OBJECTIVE    INR   Date Value Ref Range Status   2020 2.97 (H) 0.86 - 1.14 Final       ASSESSMENT / PLAN  INR assessment THER    Recheck INR In: 1 WEEK    INR Location Clinic      Anticoagulation Summary  As of 2020    INR goal:   2.0-3.0   TTR:   74.1 % (11.7 mo)   INR used for dosin.97 (2020)   Warfarin maintenance plan:   7.5 mg (5 mg x 1.5) every day   Full warfarin instructions:   : 5 mg; Otherwise 7.5 mg every day   Weekly warfarin total:   52.5 mg   Plan last modified:   Kirsty Bermudez RN (1/3/2020)   Next INR check:   2020   Priority:   Critical   Target end date:       Indications    LVAD (left ventricular assist device) present (H) [Z95.811]  Long-term (current) use of anticoagulants [Z79.01] [Z79.01]             Anticoagulation Episode Summary     INR check location:       Preferred lab:       [facility]    Comments:   HIPPA Forms mailed 17  Labs drawn either at [facility] or [facility]  LVAD placed 17   II  ASA 81 mg daily      Anticoagulation Care Providers     Provider Role Specialty Phone number    Delisa Montgomery MD [facility] [phone]            See the Encounter Report to view Anticoagulation Flowsheet and Dosing Calendar (Go to Encounters tab in chart review, and find the Anticoagulation Therapy Visit)    Spoke with patient. Gave them their lab results and new warfarin recommendation.  No changes in health, medication, or diet. No missed doses, no falls. No signs or symptoms of bleed or  clotting.     Patient had LVAD placed on:   6/19/17  Type of LVAD: HM 2  Patient's current Aspirin dose: ASA 81mg Daily  LVAD Protocol followed: Yes   If Not Followed Explanation:  N/A    Radha Pedro RN

## 2020-01-31 ENCOUNTER — CARE COORDINATION (OUTPATIENT)
Dept: CARDIOLOGY | Facility: CLINIC | Age: 65
End: 2020-01-31

## 2020-01-31 DIAGNOSIS — I50.22 CHRONIC SYSTOLIC CONGESTIVE HEART FAILURE (H): Primary | ICD-10-CM

## 2020-01-31 RX ORDER — LEVALBUTEROL INHALATION SOLUTION 1.25 MG/3ML
1.25 SOLUTION RESPIRATORY (INHALATION) EVERY 6 HOURS PRN
Qty: 30 ML | Refills: 0 | OUTPATIENT
Start: 2020-01-31

## 2020-01-31 NOTE — PROGRESS NOTES
D: Pt with slight increase in creatinine over the last week.   I: Attempted to call pt. He did not answer. Left him voicemail instructing him to decrease Bumex to 2mg in the am and 1mg in the pm. He was instructed to continue taking potassium as is and to have a BMP drawn on Monday. He was encouraged to call with any questions.  P: Will continue to monitor. Recheck labs next week.

## 2020-02-03 ENCOUNTER — CARE COORDINATION (OUTPATIENT)
Dept: CARDIOLOGY | Facility: CLINIC | Age: 65
End: 2020-02-03

## 2020-02-03 NOTE — PROGRESS NOTES
Pt called VAD coordinator this am to report continued dizziness with Losartan 37.5mg BID, and that he decreased dose to 25mg BID yesterday. At 37.5mg, he was feel so dizzy at times that he felt like he might black out. He has not fallen, and dizziness would resolve with rest. Will report this to care team. Pt reports he will get labs drawn on Wednesday, 2/5, and has a f/u in clinic on 2/13.

## 2020-02-05 ENCOUNTER — ANTICOAGULATION THERAPY VISIT (OUTPATIENT)
Dept: ANTICOAGULATION | Facility: CLINIC | Age: 65
End: 2020-02-05

## 2020-02-05 DIAGNOSIS — Z95.811 LVAD (LEFT VENTRICULAR ASSIST DEVICE) PRESENT (H): ICD-10-CM

## 2020-02-05 DIAGNOSIS — I50.22 CHRONIC SYSTOLIC CONGESTIVE HEART FAILURE (H): ICD-10-CM

## 2020-02-05 DIAGNOSIS — Z79.01 LONG TERM CURRENT USE OF ANTICOAGULANT THERAPY: ICD-10-CM

## 2020-02-05 LAB
ANION GAP SERPL CALCULATED.3IONS-SCNC: 5 MMOL/L (ref 3–14)
BUN SERPL-MCNC: 43 MG/DL (ref 7–30)
CALCIUM SERPL-MCNC: 8.6 MG/DL (ref 8.5–10.1)
CHLORIDE SERPL-SCNC: 102 MMOL/L (ref 94–109)
CO2 SERPL-SCNC: 29 MMOL/L (ref 20–32)
CREAT SERPL-MCNC: 1.4 MG/DL (ref 0.66–1.25)
GFR SERPL CREATININE-BSD FRML MDRD: 53 ML/MIN/{1.73_M2}
GLUCOSE SERPL-MCNC: 142 MG/DL (ref 70–99)
INR PPP: 2.78 (ref 0.86–1.14)
POTASSIUM SERPL-SCNC: 4.2 MMOL/L (ref 3.4–5.3)
SODIUM SERPL-SCNC: 136 MMOL/L (ref 133–144)

## 2020-02-05 PROCEDURE — 80048 BASIC METABOLIC PNL TOTAL CA: CPT | Performed by: INTERNAL MEDICINE

## 2020-02-05 PROCEDURE — 36415 COLL VENOUS BLD VENIPUNCTURE: CPT | Performed by: INTERNAL MEDICINE

## 2020-02-05 PROCEDURE — 85610 PROTHROMBIN TIME: CPT | Performed by: INTERNAL MEDICINE

## 2020-02-05 NOTE — PROGRESS NOTES
ANTICOAGULATION FOLLOW-UP CLINIC VISIT    Patient Name:  Jim Willingham  Date:  2020  Contact Type:  Telephone    SUBJECTIVE:  Patient Findings     Positives:   Change in medications (Doxycycline course completed.  Final dose of Prednisone today .)    Comments:   Spoke to Jim.  Losartan dose decreased due to side effects.  Patient reports feeling better after being hospitalized.          Clinical Outcomes     Comments:   Spoke to Jim.  Losartan dose decreased due to side effects.  Patient reports feeling better after being hospitalized.             OBJECTIVE    INR   Date Value Ref Range Status   2020 2.78 (H) 0.86 - 1.14 Final       ASSESSMENT / PLAN  INR assessment THER    Recheck INR In: 8 DAYS    INR Location Clinic      Anticoagulation Summary  As of 2020    INR goal:   2.0-3.0   TTR:   75.1 % (11.7 mo)   INR used for dosin.78 (2020)   Warfarin maintenance plan:   7.5 mg (5 mg x 1.5) every day   Full warfarin instructions:   7.5 mg every day   Weekly warfarin total:   52.5 mg   No change documented:   Brendan Montoya RN   Plan last modified:   Kirsty Bermudez RN (1/3/2020)   Next INR check:   2020   Priority:   Critical   Target end date:       Indications    LVAD (left ventricular assist device) present (H) [Z95.811]  Long-term (current) use of anticoagulants [Z79.01] [Z79.01]             Anticoagulation Episode Summary     INR check location:       Preferred lab:       Send INR reminders to:   [facility]    Comments:   HIPPA Forms mailed 17  Labs drawn either at [facility] or [facility]  LVAD placed 17   II  ASA 81 mg daily      Anticoagulation Care Providers     Provider Role Specialty Phone number    Delisa Montgomery MD Responsible Cardiology [phone]            See the Encounter Report to view Anticoagulation Flowsheet and Dosing Calendar (Go to Encounters tab in chart review, and find the Anticoagulation Therapy  Visit)    INR/CFX/F2 RESULT: INR result is 2.78    ASSESSMENT: No Problems found. No Changes in Health, or diet. No Signs of bruising, bleeding or clotting.    DOSING ADJUSTMENT: Continue 7.5mg daily    NEXT INR/FACTOR X OR FACTOR II: 2/13 at next appt    PROTOCOL FOLLOWED:LVAD 2-3  Patient had LVAD placed on:   6/19/17  Type of LVAD: HM 2  Patient's current Aspirin dose: 81mg  LVAD Protocol followed:  Yes.   If Not Followed Explanation:  Brendan Garcia, RN

## 2020-02-12 ENCOUNTER — CARE COORDINATION (OUTPATIENT)
Dept: CARDIOLOGY | Facility: CLINIC | Age: 65
End: 2020-02-12

## 2020-02-12 DIAGNOSIS — I50.22 CHRONIC SYSTOLIC CONGESTIVE HEART FAILURE (H): Primary | ICD-10-CM

## 2020-02-12 NOTE — PROGRESS NOTES
Jim left a voicemail on 2/10 stating he gained 12lb over the last 2 weeks and was wondering what to do. Called pt back on 2/11 and pt reported he hasn't been weighing himself daily and was surprised at home much he had gained. Pt reported he increased his bumex to 2 BID on 2/10 (from 2/1) and lost 2lb overnight. Pt was asked about taking metolazone. He took 2.5mg in July last year and was able to get volume off quickly. Discussed pt's symptoms with Soraya Marshall NP and received instructions to continue increased dose of Bumex 2 mg po BID, repeat labs and consider metolazone Thursday pending labs (pt is scheduled to see ROGER Rivera on Thursday). Called pt today to review instructions. He states he lost an additional 2lb overnight and verbalized understanding of plan. Pt did have labs drawn last week to follow-up on medication changes, labs are stable.

## 2020-02-13 ENCOUNTER — ANCILLARY PROCEDURE (OUTPATIENT)
Dept: CT IMAGING | Facility: CLINIC | Age: 65
End: 2020-02-13
Attending: PHYSICIAN ASSISTANT
Payer: COMMERCIAL

## 2020-02-13 ENCOUNTER — OFFICE VISIT (OUTPATIENT)
Dept: CARDIOLOGY | Facility: CLINIC | Age: 65
End: 2020-02-13
Attending: INTERNAL MEDICINE
Payer: COMMERCIAL

## 2020-02-13 ENCOUNTER — ANTICOAGULATION THERAPY VISIT (OUTPATIENT)
Dept: ANTICOAGULATION | Facility: CLINIC | Age: 65
End: 2020-02-13

## 2020-02-13 ENCOUNTER — CARE COORDINATION (OUTPATIENT)
Dept: CARDIOLOGY | Facility: CLINIC | Age: 65
End: 2020-02-13

## 2020-02-13 VITALS
BODY MASS INDEX: 39.56 KG/M2 | SYSTOLIC BLOOD PRESSURE: 88 MMHG | OXYGEN SATURATION: 98 % | WEIGHT: 261 LBS | HEIGHT: 68 IN | HEART RATE: 88 BPM

## 2020-02-13 DIAGNOSIS — E66.01 MORBID OBESITY (H): ICD-10-CM

## 2020-02-13 DIAGNOSIS — Z95.811 LVAD (LEFT VENTRICULAR ASSIST DEVICE) PRESENT (H): ICD-10-CM

## 2020-02-13 DIAGNOSIS — I50.23 ACUTE ON CHRONIC SYSTOLIC CONGESTIVE HEART FAILURE (H): ICD-10-CM

## 2020-02-13 DIAGNOSIS — I50.22 CHRONIC SYSTOLIC CONGESTIVE HEART FAILURE (H): ICD-10-CM

## 2020-02-13 DIAGNOSIS — I42.8 NICM (NONISCHEMIC CARDIOMYOPATHY) (H): ICD-10-CM

## 2020-02-13 DIAGNOSIS — I48.0 PAROXYSMAL ATRIAL FIBRILLATION (H): ICD-10-CM

## 2020-02-13 DIAGNOSIS — Z95.811 LVAD (LEFT VENTRICULAR ASSIST DEVICE) PRESENT (H): Primary | ICD-10-CM

## 2020-02-13 DIAGNOSIS — Z79.01 LONG TERM CURRENT USE OF ANTICOAGULANT THERAPY: ICD-10-CM

## 2020-02-13 DIAGNOSIS — I42.9 CARDIOMYOPATHY (H): ICD-10-CM

## 2020-02-13 LAB
ALBUMIN SERPL-MCNC: 3.6 G/DL (ref 3.4–5)
ALP SERPL-CCNC: 144 U/L (ref 40–150)
ALT SERPL W P-5'-P-CCNC: 89 U/L (ref 0–70)
ANION GAP SERPL CALCULATED.3IONS-SCNC: 6 MMOL/L (ref 3–14)
AST SERPL W P-5'-P-CCNC: 50 U/L (ref 0–45)
BILIRUB SERPL-MCNC: 0.5 MG/DL (ref 0.2–1.3)
BUN SERPL-MCNC: 48 MG/DL (ref 7–30)
CALCIUM SERPL-MCNC: 8.7 MG/DL (ref 8.5–10.1)
CHLORIDE SERPL-SCNC: 102 MMOL/L (ref 94–109)
CO2 SERPL-SCNC: 30 MMOL/L (ref 20–32)
CREAT SERPL-MCNC: 1.61 MG/DL (ref 0.66–1.25)
ERYTHROCYTE [DISTWIDTH] IN BLOOD BY AUTOMATED COUNT: 15.3 % (ref 10–15)
GFR SERPL CREATININE-BSD FRML MDRD: 45 ML/MIN/{1.73_M2}
GLUCOSE SERPL-MCNC: 281 MG/DL (ref 70–99)
HCT VFR BLD AUTO: 37.3 % (ref 40–53)
HGB BLD-MCNC: 11.2 G/DL (ref 13.3–17.7)
INR PPP: 1.97 (ref 0.86–1.14)
LDH SERPL L TO P-CCNC: 349 U/L (ref 85–227)
MCH RBC QN AUTO: 27.9 PG (ref 26.5–33)
MCHC RBC AUTO-ENTMCNC: 30 G/DL (ref 31.5–36.5)
MCV RBC AUTO: 93 FL (ref 78–100)
PLATELET # BLD AUTO: 186 10E9/L (ref 150–450)
POTASSIUM SERPL-SCNC: 4.2 MMOL/L (ref 3.4–5.3)
PROT SERPL-MCNC: 6.5 G/DL (ref 6.8–8.8)
RBC # BLD AUTO: 4.01 10E12/L (ref 4.4–5.9)
SODIUM SERPL-SCNC: 137 MMOL/L (ref 133–144)
WBC # BLD AUTO: 7.5 10E9/L (ref 4–11)

## 2020-02-13 PROCEDURE — 93750 INTERROGATION VAD IN PERSON: CPT | Mod: ZF | Performed by: PHYSICIAN ASSISTANT

## 2020-02-13 PROCEDURE — 85610 PROTHROMBIN TIME: CPT | Performed by: PHYSICIAN ASSISTANT

## 2020-02-13 PROCEDURE — 80053 COMPREHEN METABOLIC PANEL: CPT | Performed by: PHYSICIAN ASSISTANT

## 2020-02-13 PROCEDURE — 93288 INTERROG EVL PM/LDLS PM IP: CPT | Mod: ZF | Performed by: PHYSICIAN ASSISTANT

## 2020-02-13 PROCEDURE — 99215 OFFICE O/P EST HI 40 MIN: CPT | Mod: 25 | Performed by: PHYSICIAN ASSISTANT

## 2020-02-13 PROCEDURE — 83615 LACTATE (LD) (LDH) ENZYME: CPT | Performed by: PHYSICIAN ASSISTANT

## 2020-02-13 PROCEDURE — 85027 COMPLETE CBC AUTOMATED: CPT | Performed by: PHYSICIAN ASSISTANT

## 2020-02-13 PROCEDURE — G0463 HOSPITAL OUTPT CLINIC VISIT: HCPCS | Mod: 25,ZF

## 2020-02-13 PROCEDURE — 36415 COLL VENOUS BLD VENIPUNCTURE: CPT | Performed by: PHYSICIAN ASSISTANT

## 2020-02-13 RX ORDER — METOPROLOL SUCCINATE 25 MG/1
12.5 TABLET, EXTENDED RELEASE ORAL 2 TIMES DAILY
Qty: 270 TABLET | Refills: 2 | COMMUNITY
Start: 2020-02-13 | End: 2020-07-23

## 2020-02-13 RX ORDER — POTASSIUM CHLORIDE 750 MG/1
20 TABLET, EXTENDED RELEASE ORAL DAILY
Qty: 120 TABLET | Refills: 5 | Status: SHIPPED | OUTPATIENT
Start: 2020-02-13 | End: 2020-07-21

## 2020-02-13 RX ORDER — LOSARTAN POTASSIUM 25 MG/1
12.5 TABLET ORAL 2 TIMES DAILY
Qty: 60 TABLET | Refills: 0 | COMMUNITY
Start: 2020-02-13 | End: 2020-07-23

## 2020-02-13 RX ORDER — PREDNISONE 20 MG/1
20 TABLET ORAL DAILY
Qty: 14 TABLET | Refills: 0 | Status: ON HOLD | COMMUNITY
Start: 2020-02-13 | End: 2020-09-27

## 2020-02-13 ASSESSMENT — PAIN SCALES - GENERAL: PAINLEVEL: NO PAIN (0)

## 2020-02-13 ASSESSMENT — MIFFLIN-ST. JEOR: SCORE: 1958.39

## 2020-02-13 ASSESSMENT — PATIENT HEALTH QUESTIONNAIRE - PHQ9: SUM OF ALL RESPONSES TO PHQ QUESTIONS 1-9: 1

## 2020-02-13 NOTE — NURSING NOTE
1). PUMP DATA  Primary controller serial number: [id number]    HM II:   Flow: 4.3 L/min,    Speed: 9400 RPMs,     PI: 5.7 ,  Power: 5.4 Manuel,      Primary controller   Back up battery: Patient use: 19, Replace in: 25  Months     Data downloaded: No   Equipment and driveline assessed for damage: Yes     Back up : Serial number: [id number]  Back up battery: Patient use: 4 Replace in: 28  Months  Programmed settings identical to the settings on the primary controller :Yes      Education complete: Yes   Charge the BACKUP controller s backup battery every 6 months  Perform a self test on BACKUP every 6 months  Change the MPU s batteries every 6 months:Yes  Have equipment serviced yearly (if applicable):Yes    2). ALARMS  Alarms reported by patient since last pump evaluation: No  Alarms or other finding noted during pump data history and alarm download: Yes - frequent PI events, with PI ranging 2.7-5.6, rare speed drops. History back 48 hours    Action Taken:  Reviewed data with patient: Yes    3). DRESSING CHANGE / DRIVELINE ASSESSMENT  Dressing change completed today: No  Appearance of Driveline site: c/d/i per pt report    Driveline stabilization: Method: [employer]  [ Teaching reinforced on need for stabilization of Driveline. ]

## 2020-02-13 NOTE — PATIENT INSTRUCTIONS
It was a pleasure to see you in clinic today.  Please do not hesitate to call with any questions or concerns.  We look forward to seeing you in clinic at your next device check on 4/3/20.     Michelle Nash, RN, BSN  Electrophysiology Nurse Clinician  [facility]    During Business Hours Please Call:  [phone]  After Hours Please Call:  [phone] - select option #4 and ask for job code [phone]

## 2020-02-13 NOTE — NURSING NOTE
Chief Complaint   Patient presents with     Follow Up     VAD 4 month return.       Vitals were taken and medications were reconciled.     April López CMA    8:59 AM

## 2020-02-13 NOTE — PATIENT INSTRUCTIONS
Medications:  1. Continue taking Bumex 2mg twice daily  2. Continue Losartan at 25mg twice daily    Follow-up:  1. Make an appt to see a VAD NP or PA in 1 month  2. 4/3 with Dr. Montgomery    Instructions:  1. Get a device (ICD) check today.   2. Get a head CT today  3. Get labs drawn next week Mon or Tues to monitor kidney and liver function  4. Go SLOWLY with position changes. If you are laying down, sit for 3 minutes before standing, then stand for an additional 2 minutes before walking. It is ok to use a cane for balance, especially while having this dizziness.   Please call or page if you fall or if the dizziness continues.     Page the VAD Coordinator on call if you gain more than 3 lb in a day or 5 in a week. Please also page if you feel unwell or have alarms.     Great to see you in clinic today. To Page the VAD Coordinator on call, dial [phone] option #4 and ask to speak to the VAD coordinator on call.

## 2020-02-13 NOTE — LETTER
2/13/2020      RE: Jim Willingham  [address] [facility] [id number]       Dear Colleague,    Thank you for the opportunity to participate in the care of your patient, Jim Willingham, at the [facility]. Please see a copy of my visit note below.    HPI:   Jim Willingham is a 64 year old male with a past medical history of NICM (EF 20%) s/p HM II LVAD placement (6/19/17) at  (obesity) Other relavant history includes a. Fib, chronic kidney disease stage III, diabetes mellitus type 2, RV failure, CECILIA, obesity, hypertension, COPD, asthma. He is here for heart failure and LVAD follow up.     He was recently admitted from 1/20/2020 to 1/24/2020 for Influenza with COPD exacerbation. He had also been admited from 1/15-1/16 for similar symptoms and required bipap at that time. He was somewhat hypovolmic on dischrge. He was discharged on a steroid taper, he is now on prednisone 20 mg daily which was his chronic dose pre-hospitalization. His losartan was decreased. He was discharged at 250 lbs, was felt to be slightly hypovolemic at that time.     He presents to clinic today and is feeling a little bit better.  He has lost about 5 pounds since increasing his Bumex.  He is still above his dry weight.  His main complaint today is dizziness which tends to happen when he is walking. He is not having any symptoms at rest. This started when he was initially diagnosed with the flu.  He has not seemed to get better or worse with changes to his volume status.  He thinks it might of been even worse if anything when his weight was up 5 pounds.  He still feels like he has some additional volume.  He has dyspnea on exertion, he could only walk down the hallway before feeling short of breath, a few months ago he could do double that.  He has some swelling in his legs and some swelling in his abdomen, although all improving in the last few days.  He denies shortness of breath  at rest, chest pain, palpitations.  His appetite is normal.  He denies any bleeding symptoms, no symptoms of driveline line infection, and stroke symptoms.  He does note that he had a backwards fall in the shower few days ago and he thinks he might of hit his head.  He has had no headaches since or increased dizziness since.  He has had quite elevated INRs.    On the day he left the hospital, his weight was around 244. He checked it a week later and he was up to 254. His bumex was then increased back to 2 mg BID and he has been loosing weight quite consistently- down about 4-5 lbs and feeling much better.     Cardiac Medications  ASA 81 mg daily  Coumadin  Bumex 2 mg BID  KCl 20 meq daily  Losartan 25 mg BID  Metoprolol 50/25  Aldactone 25 mg Ag    PAST MEDICAL HISTORY:  Past Medical History:   Diagnosis Date     Benign essential hypertension 5/11/2017     Cardiomyopathy, unspecified (H) 5/8/2017     CKD (chronic kidney disease) stage 3, GFR 30-59 ml/min (H) 5/11/2017     Depression 5/11/2017     Diabetes mellitus (H) 5/11/2017     H/O gastric bypass 5/11/2017     ICD (implantable cardioverter-defibrillator), biventricular, in situ 5/11/2017     LVAD (left ventricular assist device) present (H)      NICM (nonischemic cardiomyopathy) (H)/ EF 20% 5/11/2017    ECHO: LVEDd. 7.66 cm, Restrictive pattern , Severe mitral valve regurgitation     CECILIA (obstructive sleep apnea) 5/11/2017     Paroxysmal atrial fibrillation (H) 5/11/2017     Paroxysmal VT (H) 5/11/2017     Uncomplicated asthma      Vitamin B12 deficiency (non anemic) 5/11/2017       FAMILY HISTORY:  Family History   Problem Relation Age of Onset     Cerebrovascular Disease Mother      Diabetes Mother      Hypertension Mother      Coronary Artery Disease Father      Diabetes Type 2  Father        SOCIAL HISTORY:  Social History     Socioeconomic History     Marital status:      Spouse name: Not on file     Number of children: Not on file     Years of  education: Not on file     Highest education level: Not on file   Occupational History     Not on file   Social Needs     Financial resource strain: Not on file     Food insecurity:     Worry: Not on file     Inability: Not on file     Transportation needs:     Medical: Not on file     Non-medical: Not on file   Tobacco Use     Smoking status: Former Smoker     Last attempt to quit:      Years since quittin.1     Smokeless tobacco: Never Used   Substance and Sexual Activity     Alcohol use: No     Drug use: No     Sexual activity: Yes     Partners: Female   Lifestyle     Physical activity:     Days per week: Not on file     Minutes per session: Not on file     Stress: Not on file   Relationships     Social connections:     Talks on phone: Not on file     Gets together: Not on file     Attends Restorationist service: Not on file     Active member of club or organization: Not on file     Attends meetings of clubs or organizations: Not on file     Relationship status: Not on file     Intimate partner violence:     Fear of current or ex partner: Not on file     Emotionally abused: Not on file     Physically abused: Not on file     Forced sexual activity: Not on file   Other Topics Concern     Parent/sibling w/ CABG, MI or angioplasty before 65F 55M? No   Social History Narrative     Not on file       CURRENT MEDICATIONS:  acetaminophen (TYLENOL) 325 MG tablet, Take 650 mg by mouth every 6 hours as needed for mild pain  albuterol (ALBUTEROL) 108 (90 BASE) MCG/ACT Inhaler, Inhale 2 puffs into the lungs every 4 hours as needed for shortness of breath / dyspnea or wheezing  allopurinol (ZYLOPRIM) 100 MG tablet, Take 1 tablet (100 mg) by mouth daily  aspirin 81 MG chewable tablet, 1 tablet (81 mg) by Oral or Feeding Tube route daily  blood glucose (NO BRAND SPECIFIED) test strip, Use to test blood sugar four times daily or as directed.  blood glucose monitoring (ONE TOUCH ULTRA 2) meter device kit, Use to test blood  sugar four times daily or as directed.  bumetanide (BUMEX) 1 MG tablet, Take 2 tablets (2 mg) by mouth 2 times daily  citalopram (CELEXA) 20 MG tablet, Take 20 mg by mouth daily  cyanocobalamin (VITAMIN B12) 1000 MCG/ML injection, Inject 1,000 mcg into the muscle every 30 days  fluticasone-vilanterol (BREO ELLIPTA) 200-25 MCG/INH oral inhaler, Inhale 1 puff into the lungs daily  gabapentin (NEURONTIN) 300 MG capsule, Take 1 capsule (300 mg) by mouth twice daily and 2 capsules (600 mg) by mouth at bedtime daily.  insulin lispro (HUMALOG KWIKPEN) 100 UNIT/ML (1 unit dial) pen, Inject 1-7 Units Subcutaneous 4 times daily  insulin pen needle (32G X 4 MM) 32G X 4 MM miscellaneous, Use four pen needles daily or as directed.  levalbuterol (XOPENEX) 1.25 MG/3ML neb solution, Take 3 mLs (1.25 mg) by nebulization every 6 hours as needed for shortness of breath / dyspnea or wheezing  losartan (COZAAR) 25 MG tablet, Take 1 tablet (25 mg) by mouth 2 times daily  metFORMIN (GLUCOPHAGE) 500 MG tablet, Take 1 tablet (500 mg) by mouth 2 times daily (with meals)  metoprolol succinate ER (TOPROL-XL) 25 MG 24 hr tablet, Take 50mg in the morning and 25mg in the evening  montelukast (SINGULAIR) 10 MG tablet, Take 10 mg by mouth At Bedtime  pantoprazole (PROTONIX) 40 MG EC tablet, Take 1 tablet (40 mg) by mouth every morning  predniSONE (DELTASONE) 20 MG tablet, Take 1 tablet (20 mg) by mouth daily  spironolactone (ALDACTONE) 25 MG tablet, Take 25 mg by mouth 2 times daily  traMADol (ULTRAM) 50 MG tablet, Take 2 tablets (100 mg) by mouth every 6 hours as needed for moderate pain or breakthrough pain  umeclidinium (INCRUSE ELLIPTA) 62.5 MCG/INH oral inhaler, Inhale 1 puff into the lungs daily  warfarin (COUMADIN) 5 MG tablet, Take 5 - 7.5mg daily or as directed by coumadin clinic.  zinc sulfate (ZINCATE) 220 (50 ZN) MG capsule, Take 220 mg by mouth 2 times daily    No current facility-administered medications on file prior to visit.  "      ROS:   CONSTITUTIONAL: Denies fever, chills.  HEENT: Denies headache, vision changes, and changes in speech.   CV: Refer to HPI.   PULMONARY:Refer to HPI.   GI:Denies nausea, vomiting, diarrhea, and abdominal pain. Bowel movements are regular.   :Denies urinary alterations, dysuria, urinary frequency, hematuria, and abnormal drainage.   EXT:See HPI.  SKIN:Denies abnormal rashes or lesions.   MUSCULOSKELETAL:Denies upper or lower extremity weakness and pain.   NEUROLOGIC +dizziness. Denies seizures, or upper or lower extremity paresthesia.     EXAM:  BP (!) 88/0 (BP Location: Right arm, Patient Position: Chair, Cuff Size: Adult Regular)   Pulse 88   Ht 1.727 m (5' 8\")   Wt 118.4 kg (261 lb)   SpO2 98%   BMI 39.68 kg/m       GENERAL: Appears comfortable, in no distress. Speaking in full sentences and able to communicate all needs.  HEENT: Eye symmetrical and without discharge or icterus bilaterally. Mucous membranes moist and without lesions.  NECK: Supple, JVD at ear at 45 degrees, 75% up neck at 90 degrees.   CV: Hum of HM3.  RESPIRATORY: Respirations regular, even, and unlabored. Lungs CTA throughout.   GI: Distended with normoactive bowel sounds present in all quadrants. No tenderness, rebound, guarding. No organomegaly.   EXTREMITIES: 1+ peripheral edema. Non pulsatile.   NEUROLOGIC: Alert and interacting appropriately. Gait observed and his stable. Good PARKER. Good finger-to-nose.  No focal deficits.   MUSCULOSKELETAL: No joint swelling or tenderness.   SKIN: No jaundice. No rashes or lesions. Driveline dressing c/d/i.    Labs - as reviewed in clinic with patient today:  CBC RESULTS:  Lab Results   Component Value Date    WBC 7.5 02/13/2020    RBC 4.01 (L) 02/13/2020    HGB 11.2 (L) 02/13/2020    HCT 37.3 (L) 02/13/2020    MCV 93 02/13/2020    MCH 27.9 02/13/2020    MCHC 30.0 (L) 02/13/2020    RDW 15.3 (H) 02/13/2020     02/13/2020       CMP RESULTS:  Lab Results   Component Value Date    NA " 137 02/13/2020    POTASSIUM 4.2 02/13/2020    CHLORIDE 102 02/13/2020    CO2 30 02/13/2020    ANIONGAP 6 02/13/2020     (H) 02/13/2020    BUN 48 (H) 02/13/2020    CR 1.61 (H) 02/13/2020    GFRESTIMATED 45 (L) 02/13/2020    GFRESTBLACK 52 (L) 02/13/2020    QAMAR 8.7 02/13/2020    BILITOTAL 0.5 02/13/2020    ALBUMIN 3.6 02/13/2020    ALKPHOS 144 02/13/2020    ALT 89 (H) 02/13/2020    AST 50 (H) 02/13/2020        INR RESULTS:  Lab Results   Component Value Date    INR 1.97 (H) 02/13/2020       Lab Results   Component Value Date    MAG 2.3 01/24/2020     Lab Results   Component Value Date    NTBNPI 4,216 (H) 06/15/2017     Lab Results   Component Value Date    NTBNP 866 (H) 07/31/2019     Diagnostics  2/13/2020 ICD Check  Patient seen in clinic for evaluation and iterative programming of his Medtronic Bi-Ventricular ICD per MD orders. Patient has an appointment to see ROGER Yeboah today.  Normal ICD function.  2 AT/AF episodes recorded since 1/21/20.  AF burden = 100%.  Patient takes Warfarin.  No ventricular arrhythmias recorded.  Intrinsic rhythm = AF, v-sensed @  bpm.  AP = 0.2%.  BVP = 56%.  OptiVol fluid index is elevated above baseline, ongoing since 12/14/19.   Estimated battery longevity to MARVA = 18 months.  Battery voltage = 2.9V.   No short v-v intervals recorded.  Lead trends appear stable. Patient reports that he has been having periodic dizzy spells in the last few weeks, typically when he is walking.  No ventricular arrhythmias recorded.  VT monitor zone reprogrammed from 150 to 140 bpm.  AF alerts programmed off. Plan for patient to return to clinic on 4/3/20 as scheduled.  GLADYS Nash RN.       Multi lead ICD    1/20/2020 [facility]  Interpretation Summary  Technically difficult study. Patients heart rate is in the 120s-150s during  the study.  HM2 LVAD is present and is funcioning at 9400RPM     Severely (EF 10-15%) reduced left ventricular function is present. Severe left  ventricular  dilation is present. LVIDd is 6.6cm LVAD cannula in the apex is  not well visualized. Inflow and outflow velocities could not be obtained.  Aortic valve appears to open minimally with each beat. Septum is probably  midline but again difficult to visualize.  Global right ventricular function is moderately to severely reduced.  IVC diameter >2.1 cm collapsing <50% with sniff suggests a high RA pressure  estimated at 15 mmHg or greater. No pericardial effusion is present.     Compared to prior study on 1/16/20, LVIDd appears slightly larger. Patient is  severely tachycardic now. IVC appears more dilated.  _____________________________________________________________________________    Assessment and Plan:   Jim Willingham is a 64 year old male with a past medical history of NICM (EF 20%) s/p HM II LVAD placement (6/19/17) at  (obesity) Other relavant history includes chronic kidney disease stage III, diabetes mellitus type 2, RV failure, CECILIA, obesity, hypertension, COPD, asthma. He is here for heart failure and LVAD follow up.     Jim has dizziness which has been ongoing since he was first diagnosed with the flu. He had thought it was getting better with diuresis, but he had more significant dizziness coming into clinic this morning and had to sit and take a break. He never had this at rest. Always with position changes which sounds very orthostatic by history, but his is frankly volume up on exam. His symptoms do not seem to be blood pressure related as he is near goal today. Given his hypervolemic exam and negative orthostatic vital signs, I am wondering if there is a post-viral inner ear component to his symptoms. His neuro exam is normal including his cerebellar function. He did have a fall with headstrike earlier this week, so we will check a head CT to be cautious. We also discussed fall precautions.     Although his orthostatic vital signs were negative, he did have decreased heart rate on standing suggesting  some chronotropic incompetence. I'm surprised that he would be so symptomatic given his LVAD, but will will decrease his metoprolol to see if we get any benefit from that.    We will attempt continued diuresis, continue current Bumex. Cr is slightly up which I suspect is cardiorenal.     #  Chronic systolic heart failure secondary to NICM  Stage D  NYHA Class IIIB  ACEi/ARB: Losartan  25 mg BID  BB: Decrease to metoprolol 25 mg BID given bradycardia on standing  Aldosterone antagonist: Spironolactone 25 mg daily   SCD prophylaxis: ICD  Fluid status:  hypervolemic- continue bumex 2 mg BID (has been diuresing well on this)     #  S/P LVAD implant as DT due to obesity. Goal is to loose weight to be eligible for transplant.  Anticoagulation: Warfarin with INR goal of 2-3.  Antiplatelet: Aspirin 81 mg daily  MAP:  Goal 65-85, 88 today  LDH: Stable at 349  VAD Interrogation today: VAD interrogation reviewed with VAD coordinator. Agree with findings. Many PI events, rare speed drops. No power spikes  or other findings suspicious of pump malfunction noted.      #  Morbid obesity: BMI >40.  Recommend weight loss with a goal weight of 255.  Weight loss clinic referral has been placed previously.    # Dizziness  ## Fall with headstrike  Dizziness since being diagnosed with the flu. Orthostatic vital signs today were negative, but he had bradycardia and dizziness on standing.  - Decrease metoprolol as above  - Head CT  - Discussed fall precautions    # A. Fib  100% burden on ICD check.  - Decreasing BB for bradycardia. Will ask primary cardiologist about potential EP referal as he also has tachycardia on his ICD check  - A/C as above     # CECILIA: Continue CPAP.       # Chronic kidney disease: BUN and creatinine more elevated today.   Baseline creatinine in the past year 1.2-1.4. I suspect this is cardiorenal. Continue diuresis.    50 minutes spent face-to-face with patient, >50% in counseling and/or coordination of care as  described above      MICHEL Yeboah REBECCA JANE

## 2020-02-13 NOTE — PROGRESS NOTES
ANTICOAGULATION FOLLOW-UP CLINIC VISIT    Patient Name:  Jim Willingham  Date:  2020  Contact Type:  Telephone    SUBJECTIVE:  Patient Findings     Comments:   Per LVAD protocol instructed pt to get an INR on  and to call the on-call LVAD Coordinator to get further dosing and return date        Clinical Outcomes     Comments:   Per LVAD protocol instructed pt to get an INR on  and to call the on-call LVAD Coordinator to get further dosing and return date           OBJECTIVE    INR   Date Value Ref Range Status   2020 1.97 (H) 0.86 - 1.14 Final       ASSESSMENT / PLAN  INR assessment SUB    Recheck INR In: 3 DAYS    INR Location Clinic      Anticoagulation Summary  As of 2020    INR goal:   2.0-3.0   TTR:   75.0 % (11.7 mo)   INR used for dosin.97! (2020)   Warfarin maintenance plan:   7.5 mg (5 mg x 1.5) every day   Full warfarin instructions:   : 10 mg; Otherwise 7.5 mg every day   Weekly warfarin total:   52.5 mg   Plan last modified:   Kristy Bermudez RN (1/3/2020)   Next INR check:   2020   Priority:   Critical   Target end date:       Indications    LVAD (left ventricular assist device) present (H) [Z95.811]  Long-term (current) use of anticoagulants [Z79.01] [Z79.01]             Anticoagulation Episode Summary     INR check location:       Preferred lab:       Send INR reminders to:   [facility]    Comments:   HIPPA Forms mailed 17  Labs drawn either at [facility] or [facility]  LVAD placed 17   II  ASA 81 mg daily      Anticoagulation Care Providers     Provider Role Specialty Phone number    Delisa Montgomery MD Responsible Cardiology [phone]            See the Encounter Report to view Anticoagulation Flowsheet and Dosing Calendar (Go to Encounters tab in chart review, and find the Anticoagulation Therapy Visit)    Left message for patient with results and dosing recommendations. Asked patient to call back to  report any missed doses, falls, signs and symptoms of bleeding or clotting, any changes in health, medication, or diet. Asked patient to call back with any questions or concerns.     Patient had LVAD placed on:   6/19/17  Type of LVAD: HM 2  Patient's current Aspirin dose: ASA 81mg Daily   LVAD Protocol followed: Yes   If Not Followed Explanation:  N/A    If pt is is within his goal range on Sunday 2/16 then have pt continue taking 7.5mg daily and check an INR on Wednesday 2/19    Radha Pedro RN

## 2020-02-13 NOTE — PROGRESS NOTES
Called pt to review further instructions from ROGER Rivera following clinic visit today. Reviewed CT head - no findings. Continue Bumex at 2mg BID. DECREASE Metoprolol to 25mg BID. RN to call pt in one week to check in on weights and dizziness, instructed pt to call/page before that if having any issues. Pt verbalized understanding.

## 2020-02-14 LAB
MDC_IDC_EPISODE_DTM: NORMAL
MDC_IDC_EPISODE_DURATION: 12 S
MDC_IDC_EPISODE_DURATION: 12 S
MDC_IDC_EPISODE_DURATION: 13 S
MDC_IDC_EPISODE_DURATION: 15 S
MDC_IDC_EPISODE_DURATION: 15 S
MDC_IDC_EPISODE_DURATION: 33 S
MDC_IDC_EPISODE_DURATION: 9 S
MDC_IDC_EPISODE_DURATION: NORMAL S
MDC_IDC_EPISODE_ID: 319
MDC_IDC_EPISODE_ID: 320
MDC_IDC_EPISODE_ID: NORMAL
MDC_IDC_EPISODE_TYPE: NORMAL
MDC_IDC_LEAD_IMPLANT_DT: NORMAL
MDC_IDC_LEAD_LOCATION: NORMAL
MDC_IDC_LEAD_LOCATION_DETAIL_1: NORMAL
MDC_IDC_LEAD_MFG: NORMAL
MDC_IDC_LEAD_MODEL: NORMAL
MDC_IDC_LEAD_POLARITY_TYPE: NORMAL
MDC_IDC_LEAD_SERIAL: NORMAL
MDC_IDC_MSMT_BATTERY_DTM: NORMAL
MDC_IDC_MSMT_BATTERY_REMAINING_LONGEVITY: 19 MO
MDC_IDC_MSMT_BATTERY_RRT_TRIGGER: 2.73
MDC_IDC_MSMT_BATTERY_STATUS: NORMAL
MDC_IDC_MSMT_BATTERY_VOLTAGE: 2.9 V
MDC_IDC_MSMT_CAP_CHARGE_DTM: NORMAL
MDC_IDC_MSMT_CAP_CHARGE_ENERGY: 18 J
MDC_IDC_MSMT_CAP_CHARGE_TIME: 4.28
MDC_IDC_MSMT_CAP_CHARGE_TYPE: NORMAL
MDC_IDC_MSMT_LEADCHNL_LV_IMPEDANCE_VALUE: 380 OHM
MDC_IDC_MSMT_LEADCHNL_LV_IMPEDANCE_VALUE: 4047 OHM
MDC_IDC_MSMT_LEADCHNL_LV_IMPEDANCE_VALUE: 4047 OHM
MDC_IDC_MSMT_LEADCHNL_LV_PACING_THRESHOLD_AMPLITUDE: 0.5 V
MDC_IDC_MSMT_LEADCHNL_LV_PACING_THRESHOLD_AMPLITUDE: 0.62 V
MDC_IDC_MSMT_LEADCHNL_LV_PACING_THRESHOLD_PULSEWIDTH: 0.4 MS
MDC_IDC_MSMT_LEADCHNL_LV_PACING_THRESHOLD_PULSEWIDTH: 0.4 MS
MDC_IDC_MSMT_LEADCHNL_RA_IMPEDANCE_VALUE: 456 OHM
MDC_IDC_MSMT_LEADCHNL_RA_PACING_THRESHOLD_AMPLITUDE: 1.25 V
MDC_IDC_MSMT_LEADCHNL_RA_PACING_THRESHOLD_PULSEWIDTH: 0.4 MS
MDC_IDC_MSMT_LEADCHNL_RA_SENSING_INTR_AMPL: 2.62 MV
MDC_IDC_MSMT_LEADCHNL_RA_SENSING_INTR_AMPL: 4 MV
MDC_IDC_MSMT_LEADCHNL_RV_IMPEDANCE_VALUE: 247 OHM
MDC_IDC_MSMT_LEADCHNL_RV_IMPEDANCE_VALUE: 342 OHM
MDC_IDC_MSMT_LEADCHNL_RV_PACING_THRESHOLD_AMPLITUDE: 1 V
MDC_IDC_MSMT_LEADCHNL_RV_PACING_THRESHOLD_AMPLITUDE: 1.12 V
MDC_IDC_MSMT_LEADCHNL_RV_PACING_THRESHOLD_PULSEWIDTH: 0.4 MS
MDC_IDC_MSMT_LEADCHNL_RV_PACING_THRESHOLD_PULSEWIDTH: 0.4 MS
MDC_IDC_MSMT_LEADCHNL_RV_SENSING_INTR_AMPL: 5.25 MV
MDC_IDC_MSMT_LEADCHNL_RV_SENSING_INTR_AMPL: 5.38 MV
MDC_IDC_PG_IMPLANT_DTM: NORMAL
MDC_IDC_PG_MFG: NORMAL
MDC_IDC_PG_MODEL: NORMAL
MDC_IDC_PG_SERIAL: NORMAL
MDC_IDC_PG_TYPE: NORMAL
MDC_IDC_SESS_CLINIC_NAME: NORMAL
MDC_IDC_SESS_DTM: NORMAL
MDC_IDC_SESS_TYPE: NORMAL
MDC_IDC_SET_BRADY_AT_MODE_SWITCH_RATE: 150 {BEATS}/MIN
MDC_IDC_SET_BRADY_LOWRATE: 50 {BEATS}/MIN
MDC_IDC_SET_BRADY_MAX_SENSOR_RATE: 130 {BEATS}/MIN
MDC_IDC_SET_BRADY_MAX_TRACKING_RATE: 130 {BEATS}/MIN
MDC_IDC_SET_BRADY_MODE: NORMAL
MDC_IDC_SET_BRADY_PAV_DELAY_LOW: 150 MS
MDC_IDC_SET_BRADY_SAV_DELAY_LOW: 110 MS
MDC_IDC_SET_CRT_LVRV_DELAY: 0 MS
MDC_IDC_SET_CRT_PACED_CHAMBERS: NORMAL
MDC_IDC_SET_LEADCHNL_LV_PACING_AMPLITUDE: 1.75 V
MDC_IDC_SET_LEADCHNL_LV_PACING_ANODE_ELECTRODE_1: NORMAL
MDC_IDC_SET_LEADCHNL_LV_PACING_ANODE_LOCATION_1: NORMAL
MDC_IDC_SET_LEADCHNL_LV_PACING_CAPTURE_MODE: NORMAL
MDC_IDC_SET_LEADCHNL_LV_PACING_CATHODE_ELECTRODE_1: NORMAL
MDC_IDC_SET_LEADCHNL_LV_PACING_CATHODE_LOCATION_1: NORMAL
MDC_IDC_SET_LEADCHNL_LV_PACING_POLARITY: NORMAL
MDC_IDC_SET_LEADCHNL_LV_PACING_PULSEWIDTH: 0.4 MS
MDC_IDC_SET_LEADCHNL_RA_PACING_AMPLITUDE: 2.5 V
MDC_IDC_SET_LEADCHNL_RA_PACING_ANODE_ELECTRODE_1: NORMAL
MDC_IDC_SET_LEADCHNL_RA_PACING_ANODE_LOCATION_1: NORMAL
MDC_IDC_SET_LEADCHNL_RA_PACING_CAPTURE_MODE: NORMAL
MDC_IDC_SET_LEADCHNL_RA_PACING_CATHODE_ELECTRODE_1: NORMAL
MDC_IDC_SET_LEADCHNL_RA_PACING_CATHODE_LOCATION_1: NORMAL
MDC_IDC_SET_LEADCHNL_RA_PACING_POLARITY: NORMAL
MDC_IDC_SET_LEADCHNL_RA_PACING_PULSEWIDTH: 0.4 MS
MDC_IDC_SET_LEADCHNL_RA_SENSING_ANODE_ELECTRODE_1: NORMAL
MDC_IDC_SET_LEADCHNL_RA_SENSING_ANODE_LOCATION_1: NORMAL
MDC_IDC_SET_LEADCHNL_RA_SENSING_CATHODE_ELECTRODE_1: NORMAL
MDC_IDC_SET_LEADCHNL_RA_SENSING_CATHODE_LOCATION_1: NORMAL
MDC_IDC_SET_LEADCHNL_RA_SENSING_POLARITY: NORMAL
MDC_IDC_SET_LEADCHNL_RA_SENSING_SENSITIVITY: 0.45 MV
MDC_IDC_SET_LEADCHNL_RV_PACING_AMPLITUDE: 2.25 V
MDC_IDC_SET_LEADCHNL_RV_PACING_ANODE_ELECTRODE_1: NORMAL
MDC_IDC_SET_LEADCHNL_RV_PACING_ANODE_LOCATION_1: NORMAL
MDC_IDC_SET_LEADCHNL_RV_PACING_CAPTURE_MODE: NORMAL
MDC_IDC_SET_LEADCHNL_RV_PACING_CATHODE_ELECTRODE_1: NORMAL
MDC_IDC_SET_LEADCHNL_RV_PACING_CATHODE_LOCATION_1: NORMAL
MDC_IDC_SET_LEADCHNL_RV_PACING_POLARITY: NORMAL
MDC_IDC_SET_LEADCHNL_RV_PACING_PULSEWIDTH: 0.4 MS
MDC_IDC_SET_LEADCHNL_RV_SENSING_ANODE_ELECTRODE_1: NORMAL
MDC_IDC_SET_LEADCHNL_RV_SENSING_ANODE_LOCATION_1: NORMAL
MDC_IDC_SET_LEADCHNL_RV_SENSING_CATHODE_ELECTRODE_1: NORMAL
MDC_IDC_SET_LEADCHNL_RV_SENSING_CATHODE_LOCATION_1: NORMAL
MDC_IDC_SET_LEADCHNL_RV_SENSING_POLARITY: NORMAL
MDC_IDC_SET_LEADCHNL_RV_SENSING_SENSITIVITY: 0.3 MV
MDC_IDC_SET_ZONE_DETECTION_BEATS_DENOMINATOR: 40 {BEATS}
MDC_IDC_SET_ZONE_DETECTION_BEATS_NUMERATOR: 30 {BEATS}
MDC_IDC_SET_ZONE_DETECTION_INTERVAL: 240 MS
MDC_IDC_SET_ZONE_DETECTION_INTERVAL: 320 MS
MDC_IDC_SET_ZONE_DETECTION_INTERVAL: 360 MS
MDC_IDC_SET_ZONE_DETECTION_INTERVAL: 400 MS
MDC_IDC_SET_ZONE_DETECTION_INTERVAL: 430 MS
MDC_IDC_SET_ZONE_TYPE: NORMAL
MDC_IDC_STAT_AT_BURDEN_PERCENT: 100 %
MDC_IDC_STAT_AT_DTM_END: NORMAL
MDC_IDC_STAT_AT_DTM_START: NORMAL
MDC_IDC_STAT_BRADY_AP_VP_PERCENT: 0.26 %
MDC_IDC_STAT_BRADY_AP_VS_PERCENT: 0.09 %
MDC_IDC_STAT_BRADY_AS_VP_PERCENT: 52.74 %
MDC_IDC_STAT_BRADY_AS_VS_PERCENT: 46.91 %
MDC_IDC_STAT_BRADY_DTM_END: NORMAL
MDC_IDC_STAT_BRADY_DTM_START: NORMAL
MDC_IDC_STAT_BRADY_RA_PERCENT_PACED: 0.23 %
MDC_IDC_STAT_BRADY_RV_PERCENT_PACED: 56.12 %
MDC_IDC_STAT_CRT_DTM_END: NORMAL
MDC_IDC_STAT_CRT_DTM_START: NORMAL
MDC_IDC_STAT_CRT_LV_PERCENT_PACED: 56.05 %
MDC_IDC_STAT_CRT_PERCENT_PACED: 56.05 %
MDC_IDC_STAT_EPISODE_RECENT_COUNT: 0
MDC_IDC_STAT_EPISODE_RECENT_COUNT: 2
MDC_IDC_STAT_EPISODE_RECENT_COUNT_DTM_END: NORMAL
MDC_IDC_STAT_EPISODE_RECENT_COUNT_DTM_START: NORMAL
MDC_IDC_STAT_EPISODE_TOTAL_COUNT: 0
MDC_IDC_STAT_EPISODE_TOTAL_COUNT: 0
MDC_IDC_STAT_EPISODE_TOTAL_COUNT: 15
MDC_IDC_STAT_EPISODE_TOTAL_COUNT: 182
MDC_IDC_STAT_EPISODE_TOTAL_COUNT: 19
MDC_IDC_STAT_EPISODE_TOTAL_COUNT: 2
MDC_IDC_STAT_EPISODE_TOTAL_COUNT: 5
MDC_IDC_STAT_EPISODE_TOTAL_COUNT_DTM_END: NORMAL
MDC_IDC_STAT_EPISODE_TOTAL_COUNT_DTM_START: NORMAL
MDC_IDC_STAT_EPISODE_TYPE: NORMAL
MDC_IDC_STAT_TACHYTHERAPY_ATP_DELIVERED_RECENT: 0
MDC_IDC_STAT_TACHYTHERAPY_ATP_DELIVERED_TOTAL: 13
MDC_IDC_STAT_TACHYTHERAPY_RECENT_DTM_END: NORMAL
MDC_IDC_STAT_TACHYTHERAPY_RECENT_DTM_START: NORMAL
MDC_IDC_STAT_TACHYTHERAPY_SHOCKS_ABORTED_RECENT: 0
MDC_IDC_STAT_TACHYTHERAPY_SHOCKS_ABORTED_TOTAL: 2
MDC_IDC_STAT_TACHYTHERAPY_SHOCKS_DELIVERED_RECENT: 0
MDC_IDC_STAT_TACHYTHERAPY_SHOCKS_DELIVERED_TOTAL: 2
MDC_IDC_STAT_TACHYTHERAPY_TOTAL_DTM_END: NORMAL
MDC_IDC_STAT_TACHYTHERAPY_TOTAL_DTM_START: NORMAL

## 2020-02-14 NOTE — PROGRESS NOTES
HPI:   Jim Willingham is a 64 year old male with a past medical history of NICM (EF 20%) s/p HM II LVAD placement (6/19/17) at [facility] (obesity) Other relavant history includes a. Fib, chronic kidney disease stage III, diabetes mellitus type 2, RV failure, CECILIA, obesity, hypertension, COPD, asthma. He is here for heart failure and LVAD follow up.     He was recently admitted from 1/20/2020 to 1/24/2020 for Influenza with COPD exacerbation. He had also been admited from 1/15-1/16 for similar symptoms and required bipap at that time. He was somewhat hypovolmic on dischrge. He was discharged on a steroid taper, he is now on prednisone 20 mg daily which was his chronic dose pre-hospitalization. His losartan was decreased. He was discharged at 250 lbs, was felt to be slightly hypovolemic at that time.     He presents to clinic today and is feeling a little bit better.  He has lost about 5 pounds since increasing his Bumex.  He is still above his dry weight.  His main complaint today is dizziness which tends to happen when he is walking. He is not having any symptoms at rest. This started when he was initially diagnosed with the flu.  He has not seemed to get better or worse with changes to his volume status.  He thinks it might of been even worse if anything when his weight was up 5 pounds.  He still feels like he has some additional volume.  He has dyspnea on exertion, he could only walk down the hallway before feeling short of breath, a few months ago he could do double that.  He has some swelling in his legs and some swelling in his abdomen, although all improving in the last few days.  He denies shortness of breath at rest, chest pain, palpitations.  His appetite is normal.  He denies any bleeding symptoms, no symptoms of driveline line infection, and stroke symptoms.  He does note that he had a backwards fall in the shower few days ago and he thinks he might of hit his head.  He has had no headaches since or increased  dizziness since.  He has had quite elevated INRs.    On the day he left the hospital, his weight was around 244. He checked it a week later and he was up to 254. His bumex was then increased back to 2 mg BID and he has been loosing weight quite consistently- down about 4-5 lbs and feeling much better.     Cardiac Medications  ASA 81 mg daily  Coumadin  Bumex 2 mg BID  KCl 20 meq daily  Losartan 25 mg BID  Metoprolol 50/25  Aldactone 25 mg Ag    PAST MEDICAL HISTORY:  Past Medical History:   Diagnosis Date     Benign essential hypertension 5/11/2017     Cardiomyopathy, unspecified (H) 5/8/2017     CKD (chronic kidney disease) stage 3, GFR 30-59 ml/min (H) 5/11/2017     Depression 5/11/2017     Diabetes mellitus (H) 5/11/2017     H/O gastric bypass 5/11/2017     ICD (implantable cardioverter-defibrillator), biventricular, in situ 5/11/2017     LVAD (left ventricular assist device) present (H)      NICM (nonischemic cardiomyopathy) (H)/ EF 20% 5/11/2017    ECHO: LVEDd. 7.66 cm, Restrictive pattern , Severe mitral valve regurgitation     CECILIA (obstructive sleep apnea) 5/11/2017     Paroxysmal atrial fibrillation (H) 5/11/2017     Paroxysmal VT (H) 5/11/2017     Uncomplicated asthma      Vitamin B12 deficiency (non anemic) 5/11/2017       FAMILY HISTORY:  Family History   Problem Relation Age of Onset     Cerebrovascular Disease Mother      Diabetes Mother      Hypertension Mother      Coronary Artery Disease Father      Diabetes Type 2  Father        SOCIAL HISTORY:  Social History     Socioeconomic History     Marital status:      Spouse name: Not on file     Number of children: Not on file     Years of education: Not on file     Highest education level: Not on file   Occupational History     Not on file   Social Needs     Financial resource strain: Not on file     Food insecurity:     Worry: Not on file     Inability: Not on file     Transportation needs:     Medical: Not on file     Non-medical: Not on file    Tobacco Use     Smoking status: Former Smoker     Last attempt to quit:      Years since quittin.1     Smokeless tobacco: Never Used   Substance and Sexual Activity     Alcohol use: No     Drug use: No     Sexual activity: Yes     Partners: Female   Lifestyle     Physical activity:     Days per week: Not on file     Minutes per session: Not on file     Stress: Not on file   Relationships     Social connections:     Talks on phone: Not on file     Gets together: Not on file     Attends Hinduism service: Not on file     Active member of club or organization: Not on file     Attends meetings of clubs or organizations: Not on file     Relationship status: Not on file     Intimate partner violence:     Fear of current or ex partner: Not on file     Emotionally abused: Not on file     Physically abused: Not on file     Forced sexual activity: Not on file   Other Topics Concern     Parent/sibling w/ CABG, MI or angioplasty before 65F 55M? No   Social History Narrative     Not on file       CURRENT MEDICATIONS:  acetaminophen (TYLENOL) 325 MG tablet, Take 650 mg by mouth every 6 hours as needed for mild pain  albuterol (ALBUTEROL) 108 (90 BASE) MCG/ACT Inhaler, Inhale 2 puffs into the lungs every 4 hours as needed for shortness of breath / dyspnea or wheezing  allopurinol (ZYLOPRIM) 100 MG tablet, Take 1 tablet (100 mg) by mouth daily  aspirin 81 MG chewable tablet, 1 tablet (81 mg) by Oral or Feeding Tube route daily  blood glucose (NO BRAND SPECIFIED) test strip, Use to test blood sugar four times daily or as directed.  blood glucose monitoring (ONE TOUCH ULTRA 2) meter device kit, Use to test blood sugar four times daily or as directed.  bumetanide (BUMEX) 1 MG tablet, Take 2 tablets (2 mg) by mouth 2 times daily  citalopram (CELEXA) 20 MG tablet, Take 20 mg by mouth daily  cyanocobalamin (VITAMIN B12) 1000 MCG/ML injection, Inject 1,000 mcg into the muscle every 30 days  fluticasone-vilanterol (BREO ELLIPTA)  200-25 MCG/INH oral inhaler, Inhale 1 puff into the lungs daily  gabapentin (NEURONTIN) 300 MG capsule, Take 1 capsule (300 mg) by mouth twice daily and 2 capsules (600 mg) by mouth at bedtime daily.  insulin lispro (HUMALOG KWIKPEN) 100 UNIT/ML (1 unit dial) pen, Inject 1-7 Units Subcutaneous 4 times daily  insulin pen needle (32G X 4 MM) 32G X 4 MM miscellaneous, Use four pen needles daily or as directed.  levalbuterol (XOPENEX) 1.25 MG/3ML neb solution, Take 3 mLs (1.25 mg) by nebulization every 6 hours as needed for shortness of breath / dyspnea or wheezing  losartan (COZAAR) 25 MG tablet, Take 1 tablet (25 mg) by mouth 2 times daily  metFORMIN (GLUCOPHAGE) 500 MG tablet, Take 1 tablet (500 mg) by mouth 2 times daily (with meals)  metoprolol succinate ER (TOPROL-XL) 25 MG 24 hr tablet, Take 50mg in the morning and 25mg in the evening  montelukast (SINGULAIR) 10 MG tablet, Take 10 mg by mouth At Bedtime  pantoprazole (PROTONIX) 40 MG EC tablet, Take 1 tablet (40 mg) by mouth every morning  predniSONE (DELTASONE) 20 MG tablet, Take 1 tablet (20 mg) by mouth daily  spironolactone (ALDACTONE) 25 MG tablet, Take 25 mg by mouth 2 times daily  traMADol (ULTRAM) 50 MG tablet, Take 2 tablets (100 mg) by mouth every 6 hours as needed for moderate pain or breakthrough pain  umeclidinium (INCRUSE ELLIPTA) 62.5 MCG/INH oral inhaler, Inhale 1 puff into the lungs daily  warfarin (COUMADIN) 5 MG tablet, Take 5 - 7.5mg daily or as directed by coumadin clinic.  zinc sulfate (ZINCATE) 220 (50 ZN) MG capsule, Take 220 mg by mouth 2 times daily    No current facility-administered medications on file prior to visit.       ROS:   CONSTITUTIONAL: Denies fever, chills.  HEENT: Denies headache, vision changes, and changes in speech.   CV: Refer to HPI.   PULMONARY:Refer to HPI.   GI:Denies nausea, vomiting, diarrhea, and abdominal pain. Bowel movements are regular.   :Denies urinary alterations, dysuria, urinary frequency, hematuria,  "and abnormal drainage.   EXT:See HPI.  SKIN:Denies abnormal rashes or lesions.   MUSCULOSKELETAL:Denies upper or lower extremity weakness and pain.   NEUROLOGIC +dizziness. Denies seizures, or upper or lower extremity paresthesia.     EXAM:  BP (!) 88/0 (BP Location: Right arm, Patient Position: Chair, Cuff Size: Adult Regular)   Pulse 88   Ht 1.727 m (5' 8\")   Wt 118.4 kg (261 lb)   SpO2 98%   BMI 39.68 kg/m      GENERAL: Appears comfortable, in no distress. Speaking in full sentences and able to communicate all needs.  HEENT: Eye symmetrical and without discharge or icterus bilaterally. Mucous membranes moist and without lesions.  NECK: Supple, JVD at ear at 45 degrees, 75% up neck at 90 degrees.   CV: Hum of HM3.  RESPIRATORY: Respirations regular, even, and unlabored. Lungs CTA throughout.   GI: Distended with normoactive bowel sounds present in all quadrants. No tenderness, rebound, guarding. No organomegaly.   EXTREMITIES: 1+ peripheral edema. Non pulsatile.   NEUROLOGIC: Alert and interacting appropriately. Gait observed and his stable. Good PARKER. Good finger-to-nose.  No focal deficits.   MUSCULOSKELETAL: No joint swelling or tenderness.   SKIN: No jaundice. No rashes or lesions. Driveline dressing c/d/i.    Labs - as reviewed in clinic with patient today:  CBC RESULTS:  Lab Results   Component Value Date    WBC 7.5 02/13/2020    RBC 4.01 (L) 02/13/2020    HGB 11.2 (L) 02/13/2020    HCT 37.3 (L) 02/13/2020    MCV 93 02/13/2020    MCH 27.9 02/13/2020    MCHC 30.0 (L) 02/13/2020    RDW 15.3 (H) 02/13/2020     02/13/2020       CMP RESULTS:  Lab Results   Component Value Date     02/13/2020    POTASSIUM 4.2 02/13/2020    CHLORIDE 102 02/13/2020    CO2 30 02/13/2020    ANIONGAP 6 02/13/2020     (H) 02/13/2020    BUN 48 (H) 02/13/2020    CR 1.61 (H) 02/13/2020    GFRESTIMATED 45 (L) 02/13/2020    GFRESTBLACK 52 (L) 02/13/2020    QAMAR 8.7 02/13/2020    BILITOTAL 0.5 02/13/2020    ALBUMIN 3.6 " 02/13/2020    ALKPHOS 144 02/13/2020    ALT 89 (H) 02/13/2020    AST 50 (H) 02/13/2020        INR RESULTS:  Lab Results   Component Value Date    INR 1.97 (H) 02/13/2020       Lab Results   Component Value Date    MAG 2.3 01/24/2020     Lab Results   Component Value Date    NTBNPI 4,216 (H) 06/15/2017     Lab Results   Component Value Date    NTBNP 866 (H) 07/31/2019     Diagnostics  2/13/2020 ICD Check  Patient seen in clinic for evaluation and iterative programming of his Med[employer]onic Bi-Ventricular ICD per MD orders. Patient has an appointment to see ROGER Yeboah today.  Normal ICD function.  2 AT/AF episodes recorded since 1/21/20.  AF burden = 100%.  Patient takes Warfarin.  No ventricular arrhythmias recorded.  Intrinsic rhythm = AF, v-sensed @  bpm.  AP = 0.2%.  BVP = 56%.  OptiVol fluid index is elevated above baseline, ongoing since 12/14/19.   Estimated battery longevity to MARVA = 18 months.  Battery voltage = 2.9V.   No short v-v intervals recorded.  Lead trends appear stable. Patient reports that he has been having periodic dizzy spells in the last few weeks, typically when he is walking.  No ventricular arrhythmias recorded.  VT monitor zone reprogrammed from 150 to 140 bpm.  AF alerts programmed off. Plan for patient to return to clinic on 4/3/20 as scheduled.  GLADYS Nash RN.       Multi lead ICD    1/20/2020 [facility]  Interpretation Summary  Technically difficult study. Patients heart rate is in the 120s-150s during  the study.  HM2 LVAD is present and is funcioning at 9400RPM     Severely (EF 10-15%) reduced left ventricular function is present. Severe left  ventricular dilation is present. LVIDd is 6.6cm LVAD cannula in the apex is  not well visualized. Inflow and outflow velocities could not be obtained.  Aortic valve appears to open minimally with each beat. Septum is probably  midline but again difficult to visualize.  Global right ventricular function is moderately to severely  reduced.  IVC diameter >2.1 cm collapsing <50% with sniff suggests a high RA pressure  estimated at 15 mmHg or greater. No pericardial effusion is present.     Compared to prior study on 1/16/20, LVIDd appears slightly larger. Patient is  severely tachycardic now. IVC appears more dilated.  _____________________________________________________________________________    Assessment and Plan:   Jim Willingham is a 64 year old male with a past medical history of NICM (EF 20%) s/p HM II LVAD placement (6/19/17) at  (obesity) Other relavant history includes chronic kidney disease stage III, diabetes mellitus type 2, RV failure, CECILIA, obesity, hypertension, COPD, asthma. He is here for heart failure and LVAD follow up.     Jim has dizziness which has been ongoing since he was first diagnosed with the flu. He had thought it was getting better with diuresis, but he had more significant dizziness coming into clinic this morning and had to sit and take a break. He never had this at rest. Always with position changes which sounds very orthostatic by history, but his is frankly volume up on exam. His symptoms do not seem to be blood pressure related as he is near goal today. Given his hypervolemic exam and negative orthostatic vital signs, I am wondering if there is a post-viral inner ear component to his symptoms. His neuro exam is normal including his cerebellar function. He did have a fall with headstrike earlier this week, so we will check a head CT to be cautious. We also discussed fall precautions.     Although his orthostatic vital signs were negative, he did have decreased heart rate on standing suggesting some chronotropic incompetence. I'm surprised that he would be so symptomatic given his LVAD, but will will decrease his metoprolol to see if we get any benefit from that.    We will attempt continued diuresis, continue current Bumex. Cr is slightly up which I suspect is cardiorenal.     #  Chronic systolic heart  failure secondary to NICM  Stage D  NYHA Class IIIB  ACEi/ARB: Losartan 25 mg BID  BB: Decrease to metoprolol 25 mg BID given bradycardia on standing  Aldosterone antagonist: Spironolactone 25 mg daily   SCD prophylaxis: ICD  Fluid status: hypervolemic- continue bumex 2 mg BID (has been diuresing well on this)     #  S/P LVAD implant as [facility] due to obesity. Goal is to loose weight to be eligible for transplant.  Anticoagulation: Warfarin with INR goal of 2-3.  Antiplatelet: Aspirin 81 mg daily  MAP: Goal 65-85, 88 today  LDH: Stable at 349  VAD Interrogation today: VAD interrogation reviewed with VAD coordinator. Agree with findings. Many PI events, rare speed drops. No power spikes  or other findings suspicious of pump malfunction noted.      #  Morbid obesity: BMI >40.  Recommend weight loss with a goal weight of 255.  Weight loss clinic referral has been placed previously.    # Dizziness  ## Fall with headstrike  Dizziness since being diagnosed with the flu. Orthostatic vital signs today were negative, but he had bradycardia and dizziness on standing.  - Decrease metoprolol as above  - Head CT  - Discussed fall precautions    # A. Fib  100% burden on ICD check.  - Decreasing BB for bradycardia. Will ask primary cardiologist about potential EP referal as he also has tachycardia on his ICD check  - A/C as above     # CECILIA: Continue CPAP.       # Chronic kidney disease: BUN and creatinine more elevated today.   Baseline creatinine in the past year 1.2-1.4. I suspect this is cardiorenal. Continue diuresis.    50 minutes spent face-to-face with patient, >50% in counseling and/or coordination of care as described above      MICHEL Yeboah REBECCA JANE

## 2020-02-16 ENCOUNTER — TELEPHONE (OUTPATIENT)
Dept: CARDIOLOGY | Facility: CLINIC | Age: 65
End: 2020-02-16

## 2020-02-16 NOTE — TELEPHONE ENCOUNTER
D:  I was reviewing this patient's chart because I had been informed that he was to have a repeat INR performed today and call the results to the on call LVAD coordinator.    I: There were not results present, so I contacted the patient in follow up.  He reported that he had been told to have his INR checked on Monday along with other labs.  I did not find a lab appointment in his chart, but told him that if he had orders for Monday we would look at his results at that time and contact him for next steps.  He reported that he was following the dosing instructions that Radha had given him: 10 mg on 2/13, 7.5 mg daily after that.  A: Plan to have INR checked in a.m.  P:  Per protocol.

## 2020-02-17 ENCOUNTER — ANTICOAGULATION THERAPY VISIT (OUTPATIENT)
Dept: ANTICOAGULATION | Facility: CLINIC | Age: 65
End: 2020-02-17

## 2020-02-17 DIAGNOSIS — Z95.811 LVAD (LEFT VENTRICULAR ASSIST DEVICE) PRESENT (H): ICD-10-CM

## 2020-02-17 DIAGNOSIS — Z79.01 LONG TERM CURRENT USE OF ANTICOAGULANT THERAPY: ICD-10-CM

## 2020-02-17 DIAGNOSIS — E11.40 TYPE 2 DIABETES MELLITUS WITH DIABETIC NEUROPATHY, WITHOUT LONG-TERM CURRENT USE OF INSULIN (H): ICD-10-CM

## 2020-02-17 DIAGNOSIS — I50.22 CHRONIC SYSTOLIC CONGESTIVE HEART FAILURE (H): ICD-10-CM

## 2020-02-17 LAB
ALBUMIN SERPL-MCNC: 3.5 G/DL (ref 3.4–5)
ALP SERPL-CCNC: 116 U/L (ref 40–150)
ALT SERPL W P-5'-P-CCNC: 60 U/L (ref 0–70)
ANION GAP SERPL CALCULATED.3IONS-SCNC: 6 MMOL/L (ref 3–14)
AST SERPL W P-5'-P-CCNC: 22 U/L (ref 0–45)
BILIRUB SERPL-MCNC: 0.5 MG/DL (ref 0.2–1.3)
BUN SERPL-MCNC: 38 MG/DL (ref 7–30)
CALCIUM SERPL-MCNC: 8.7 MG/DL (ref 8.5–10.1)
CHLORIDE SERPL-SCNC: 101 MMOL/L (ref 94–109)
CO2 SERPL-SCNC: 29 MMOL/L (ref 20–32)
CREAT SERPL-MCNC: 1.23 MG/DL (ref 0.66–1.25)
GFR SERPL CREATININE-BSD FRML MDRD: 62 ML/MIN/{1.73_M2}
GLUCOSE SERPL-MCNC: 214 MG/DL (ref 70–99)
INR PPP: 2.84 (ref 0.86–1.14)
POTASSIUM SERPL-SCNC: 4.1 MMOL/L (ref 3.4–5.3)
PROT SERPL-MCNC: 6.4 G/DL (ref 6.8–8.8)
SODIUM SERPL-SCNC: 136 MMOL/L (ref 133–144)

## 2020-02-17 PROCEDURE — 80053 COMPREHEN METABOLIC PANEL: CPT | Performed by: PHYSICIAN ASSISTANT

## 2020-02-17 PROCEDURE — 85610 PROTHROMBIN TIME: CPT | Performed by: PHYSICIAN ASSISTANT

## 2020-02-17 PROCEDURE — 36415 COLL VENOUS BLD VENIPUNCTURE: CPT | Performed by: PHYSICIAN ASSISTANT

## 2020-02-17 NOTE — PROGRESS NOTES
ANTICOAGULATION FOLLOW-UP CLINIC VISIT    Patient Name:  Jim Willingham  Date:  2020  Contact Type:  Telephone    SUBJECTIVE:         OBJECTIVE    INR   Date Value Ref Range Status   2020 2.84 (H) 0.86 - 1.14 Final       ASSESSMENT / PLAN  INR assessment THER    Recheck INR In: 10 DAYS    INR Location Clinic      Anticoagulation Summary  As of 2020    INR goal:   2.0-3.0   TTR:   75.0 % (11.7 mo)   INR used for dosin.84 (2020)   Warfarin maintenance plan:   7.5 mg (5 mg x 1.5) every day   Full warfarin instructions:   7.5 mg every day   Weekly warfarin total:   52.5 mg   No change documented:   Brendan Montoya RN   Plan last modified:   Kirsty Bermudez RN (1/3/2020)   Next INR check:   2020   Priority:   Critical   Target end date:       Indications    LVAD (left ventricular assist device) present (H) [Z95.811]  Long-term (current) use of anticoagulants [Z79.01] [Z79.01]             Anticoagulation Episode Summary     INR check location:       Preferred lab:       Send INR reminders to:   [facility]    Comments:   HIPPA Forms mailed 17  Labs drawn [facility] at [facility] or [facility]  LVAD placed 17   II  ASA 81 mg daily      Anticoagulation Care Providers     Provider Role Specialty Phone number    Delisa Montgomery MD [facility] Cardiology [phone]            See the Encounter Report to view Anticoagulation Flowsheet and Dosing Calendar (Go to Encounters tab in chart review, and find the Anticoagulation Therapy Visit)    INR/CFX/F2 RESULT:INR result is 2.84 on     ASSESSMENT:No Problems found. No Changes in Health, Medications, or diet. No Signs of bruising, bleeding or clotting.    DOSING ADJUSTMENT: continue current maintenance dose    NEXT INR/FACTOR X OR FACTOR II:    PROTOCOL FOLLOWED: goal 2-3 LVAD  Patient had LVAD placed on:   17  Type of LVAD: HM 2  Patient's current Aspirin dose: 81mg  LVAD Protocol followed:  Yes.    If Not Followed Explanation:  Brendan Garcia, RN

## 2020-02-18 ENCOUNTER — CARE COORDINATION (OUTPATIENT)
Dept: CARDIOLOGY | Facility: CLINIC | Age: 65
End: 2020-02-18

## 2020-02-18 NOTE — TELEPHONE ENCOUNTER
blood glucose (NO BRAND SPECIFIED) test strip      Last Written Prescription Date:  1-  Last Fill Quantity: 100,   # refills: 0  Last Office Visit : 2-  Future Office visit:  4-3-2020    Routing refill request to provider for review/approval because:  Not on protocol        Kathleen M Doege RN

## 2020-02-18 NOTE — PROGRESS NOTES
"Pt has labs drawn yesterday. Reviewed by ROGER Rivera. Called pt to review results. Pt reports he's feeling better than last week. The \"wilting\" feeling is less, and only has dizziness approx 1-2 times per day, but no near black out episodes like last week. Pt reports weight is about the same as last week. Still on Toprol 25mg bid, Losartan 25mg bid, Spironolactone 25mg bid, Bumex 2mg bid. Will discuss with Melisa to see if any further changes are necessary.   "

## 2020-02-19 DIAGNOSIS — E11.43 TYPE 2 DIABETES MELLITUS WITH DIABETIC AUTONOMIC NEUROPATHY, WITHOUT LONG-TERM CURRENT USE OF INSULIN (H): ICD-10-CM

## 2020-02-19 NOTE — TELEPHONE ENCOUNTER
HUMALOG 100 U/ML KWIK PEN INJ 3ML  Last Written Prescription Date:  1/28/2020  Last Fill Quantity: 3 mL,   # refills: 1  Last Office Visit : 2/13/2020  Future Office visit:  4/3/2020    Routing refill request to provider for review/approval because:  Labs Due:  LIPID PROFILE & MICRO ALBUMIN  Also, Will Cardiology continue to refill insulins for Pt care??  Or, Will Pt be following a Provider for Continued refills for Insulins for Pt care??  Refer to Provider for review.        Clara Quiroz RN  Central Triage Red Flags/Med Refills

## 2020-02-20 RX ORDER — INSULIN LISPRO 100 [IU]/ML
INJECTION, SOLUTION INTRAVENOUS; SUBCUTANEOUS
Qty: 3 ML | OUTPATIENT
Start: 2020-02-20

## 2020-02-20 NOTE — TELEPHONE ENCOUNTER
Pt was using Humalog while on prednisone taper. Taper is complete and Humalog is no longer needed.

## 2020-02-25 ENCOUNTER — ANTICOAGULATION THERAPY VISIT (OUTPATIENT)
Dept: ANTICOAGULATION | Facility: CLINIC | Age: 65
End: 2020-02-25

## 2020-02-25 DIAGNOSIS — Z95.811 LVAD (LEFT VENTRICULAR ASSIST DEVICE) PRESENT (H): ICD-10-CM

## 2020-02-25 DIAGNOSIS — Z79.01 LONG TERM CURRENT USE OF ANTICOAGULANT THERAPY: ICD-10-CM

## 2020-02-25 LAB — INR PPP: 1.8 (ref 0.86–1.14)

## 2020-02-25 PROCEDURE — 36415 COLL VENOUS BLD VENIPUNCTURE: CPT | Performed by: INTERNAL MEDICINE

## 2020-02-25 PROCEDURE — 85610 PROTHROMBIN TIME: CPT | Performed by: INTERNAL MEDICINE

## 2020-02-25 NOTE — PROGRESS NOTES
ANTICOAGULATION FOLLOW-UP CLINIC VISIT    Patient Name:  Jim Willingham  Date:  2020  Contact Type:  Telephone    SUBJECTIVE:  Patient Findings     Positives:   Upcoming dental procedure (today had 4 teeth pulled.)             OBJECTIVE    INR   Date Value Ref Range Status   2020 1.80 (H) 0.86 - 1.14 Final       ASSESSMENT / PLAN  INR assessment SUB    Recheck INR In: 6 DAYS    INR Location Clinic      Anticoagulation Summary  As of 2020    INR goal:   2.0-3.0   TTR:   74.6 % (11.7 mo)   INR used for dosin.80! (2020)   Warfarin maintenance plan:   7.5 mg (5 mg x 1.5) every day   Full warfarin instructions:   7.5 mg every day   Weekly warfarin total:   52.5 mg   Plan last modified:   Kirsty Bermudez RN (1/3/2020)   Next INR check:   3/2/2020   Priority:   Critical   Target end date:       Indications    LVAD (left ventricular assist device) present (H) [Z95.811]  Long-term (current) use of anticoagulants [Z79.01] [Z79.01]             Anticoagulation Episode Summary     INR check location:       Preferred lab:       Send INR reminders to:   [facility]    Comments:   HIPPA Forms mailed 17  Labs drawn either at [facility] or [facility]  LVAD placed 17   II  ASA 81 mg daily      Anticoagulation Care Providers     Provider Role Specialty Phone number    [place] MD Responsible Cardiology [phone]            See the Encounter Report to view Anticoagulation Flowsheet and Dosing Calendar (Go to Encounters tab in chart review, and find the Anticoagulation Therapy Visit)    Spoke with patient.  Patient had LVAD placed on: 17    Type of LVAD: HM2  Patient's current Aspirin dose: 81mg  LVAD Protocol followed:   No.   If Not Followed Explanation:  Patient had a dental procedure with 4 teeth pulled today.  He is to restart his coumadin tomorrow at the regular dose, and recheck INR on Monday, 3/2..      Delisa Hillman RN

## 2020-02-26 DIAGNOSIS — I48.91 A-FIB (H): Primary | ICD-10-CM

## 2020-02-26 NOTE — PROGRESS NOTES
Pt with new (since around 1/20/20) 100% AF burden on ICD check. ROGER Rivera requested referral to Electrophysiology for recommendations. Referral ordered and pt notified.

## 2020-03-02 NOTE — PROGRESS NOTES
Addendum  3/2/20    Patient did not have their labs done today. He has an appointment at the [facility] on 3/3/20 will look for labs then.    Jae Bedolla [facility]

## 2020-03-05 ENCOUNTER — OFFICE VISIT (OUTPATIENT)
Dept: CARDIOLOGY | Facility: CLINIC | Age: 65
End: 2020-03-05
Payer: COMMERCIAL

## 2020-03-05 ENCOUNTER — ANTICOAGULATION THERAPY VISIT (OUTPATIENT)
Dept: ANTICOAGULATION | Facility: CLINIC | Age: 65
End: 2020-03-05

## 2020-03-05 ENCOUNTER — DOCUMENTATION ONLY (OUTPATIENT)
Dept: CARE COORDINATION | Facility: CLINIC | Age: 65
End: 2020-03-05

## 2020-03-05 VITALS
WEIGHT: 257.5 LBS | TEMPERATURE: 97.7 F | BODY MASS INDEX: 39.15 KG/M2 | HEART RATE: 87 BPM | RESPIRATION RATE: 16 BRPM | OXYGEN SATURATION: 97 %

## 2020-03-05 DIAGNOSIS — Z95.811 LVAD (LEFT VENTRICULAR ASSIST DEVICE) PRESENT (H): ICD-10-CM

## 2020-03-05 DIAGNOSIS — I47.29 PAROXYSMAL VENTRICULAR TACHYCARDIA (H): ICD-10-CM

## 2020-03-05 DIAGNOSIS — I48.19 PERSISTENT ATRIAL FIBRILLATION (H): Primary | ICD-10-CM

## 2020-03-05 DIAGNOSIS — Z79.01 LONG TERM CURRENT USE OF ANTICOAGULANT THERAPY: ICD-10-CM

## 2020-03-05 DIAGNOSIS — Z95.810 ICD (IMPLANTABLE CARDIOVERTER-DEFIBRILLATOR), BIVENTRICULAR, IN SITU: ICD-10-CM

## 2020-03-05 LAB — INR PPP: 1.67 (ref 0.86–1.14)

## 2020-03-05 PROCEDURE — 93005 ELECTROCARDIOGRAM TRACING: CPT | Performed by: INTERNAL MEDICINE

## 2020-03-05 PROCEDURE — 85610 PROTHROMBIN TIME: CPT | Performed by: INTERNAL MEDICINE

## 2020-03-05 PROCEDURE — 99215 OFFICE O/P EST HI 40 MIN: CPT | Performed by: INTERNAL MEDICINE

## 2020-03-05 PROCEDURE — 36416 COLLJ CAPILLARY BLOOD SPEC: CPT | Performed by: INTERNAL MEDICINE

## 2020-03-05 NOTE — LETTER
3/5/2020      RE: Jim Willingham  [address] [facility] [id number]       Dear Colleague,    Thank you for the opportunity to participate in the care of your patient, Jim Willingham, at the [facility] at [facility]. Please see a copy of my visit note below.    I am delighted to see Jim Willingham in consultation for atrial fibrillation.    History of Present Illness:  As you know, the patient is a 64 year old  Male who was noted to have persistent Afib on his ICD interrogation.     He has nonischemic cardiomyopathy s/p destination LVAD 6/19/17, follows with Dr. Montgomery. Medtronic CRT-D initially implanted at [facility] 3/6/08. History of atrial fibrillation s/p cardioversion in 2011. Since then he's had paroxysmal episodes of Afib. In mid-January he was admitted to hospital with influenza, found to have afib with RVR. Since then, afib has been persistent.    He denies palpitations, but states that since mid January he's had frequently episodes of lightheadedness only when he's walking. He describes episodes as occurring when he gets up, starts walking, then feels weak, lightheaded, rests for a bit, then keeps going. He has some headaches with these episodes. Does not happen every time he walks - only half the time. Never has episodes when he's sitting or lying down. No chest discomfort, dyspnea, syncope. He's fairly active normally, takes care of grandkids.    The fllowing portions of the patient's history were reviewed and updated as appropriate: allergies, current medications, past family history, past medical history, past social history, past surgical history, and the problem list.    Past Medical History:  1. Atrial fibrillation, h/o cardioversion 1/14/2011.  2. Nonischemic cardiomyopathy, destination LVAD 6/19/2017.  3. Ventricular tachycardia s/p Medtronic CRT-D 3/6/2008, generator change 9/6/2013, all at [facility].  ICD shock for VT 2013, treated with amiodarone at that time. One shock since LVAD, on 11/20/2018 while he was sleeping, appropriate according to device clinic notes but I am unable to find interrogation data  4. CECILIA on CPAP  5. Diabetes  6. Carpal tunnel/arthritis, on chronic prednisone      Medications:   Aspirin 81 every day  Warfarin  Bumex 2 mg bid  Metoprolol succinate 25 bid  KCL 20 meq every day  Spironolactone 25 bid  Losartan 25 qd  Metformin 500/1000  Prednisone 20 every day  Inhalers    Allergies:    Allergies   Allergen Reactions     Beer Shortness Of Breath     Grass Shortness Of Breath     Ace Inhibitors Unknown     Dust Mites Other (See Comments)     Asthma     Mold Other (See Comments)     Asthma     Penicillins      Sulfa Drugs Unknown and Other (See Comments)     PN: unknown       Family History:   Family History   Problem Relation Age of Onset     Cerebrovascular Disease Mother      Diabetes Mother      Hypertension Mother      Coronary Artery Disease Father      Diabetes Type 2  Father        Psychosocial history:  reports that he quit smoking about 25 years ago. He has never used smokeless tobacco. He reports that he does not drink alcohol or use drugs.    Review of systems:   Cardiovascular: No palpitations, chest pain, shortness of breath at rest, dyspnea with exertion, orthopnea, paroxysmal nocturia dyspnea, nocturia, dizziness, syncope.    In addition,   Constitutional: No change in weight, sleep or appetite.  Normal energy.  No fever or chills  Eyes: Negative for vision changes or eye problems  ENT: No problems with ears, nose or throat.  No difficulty swallowing.  Resp: No coughing, wheezing or shortness of breath  GI: No nausea, vomiting,  heartburn, abdominal pain, diarrhea, constipation or change in bowel habits  : No urinary frequency or dysuria, bladder or kidney problems  Musculoskeletal: No significant muscle or joint pains  Neurologic: No headaches, numbness, tingling, weakness,  problems with balance or coordination  Psychiatric: No problems with anxiety, depression or mental health  Heme/immune/allergy: No history of bleeding or clotting problems or anemia.  No allergies or immune system problems  Integumentary: No rashes,worrisome lesions or skin problems      Physical examination  Vitals: Pulse 87   Temp 97.7  F (36.5  C) (Oral)   Resp 16   Wt 116.8 kg (257 lb 8 oz)   SpO2 97%   BMI 39.15 kg/m     BMI= Body mass index is 39.15 kg/m .    Constitutional: In general, the patient is a pleasant male in no apparent distress.    Eyes: PERRLA.  EOMI.  Sclerae white, not injected.  ENT/mouth: Normiocephalic and atraumatic.  Nares clear.  Pharynx without erythema or exudate.  Dentition intact.  No adenopathy.  No thyromegaly. Carotids +2/2 bilaterally without bruits.  No jugular venous distension.   Card/Vasc: Unable to assess due to LVAD  Respiratory: Clear to asculation.  No ronchi, wheezes, rales.  No dullness to percussion.   GI: Abdomen is soft, nontender, nondistended. No organomegaly. No AAA.  No bruits.   Integument: No significant bruises or rashes  Neurological: The neurological examination reveal a patient who was oriented to person, place, and time.    Psych: Normal  Heme/Lymph/Immun: no significant adenopathy      I have reviewed the following labs/imaging:  Labs: 2/13/2020: hgb 11, plt 153K, ALT 89, AST 50  2/17/2020: K 4.1, cr 1.23    INR 2/25/2020: 1.8    I have personally and independently reviewed the following:  EKGs:   today 3/5/2020: afib 100 bpm with biventricular pacing  1/15/2020: afib 140 bpm  1/21/2020: afib 144 bpm    ICD interrogation in clinic 2/13/2020:  Medtronic CRT-D, DDDR  bpm, VF > 188 bpm  Estimated battery longevity 18 months  Underlying rhythm is afib with native conduction at 100 bpm  Persistent afib since mid January  BIV pacing 56% (previously when in SR, BIV pacing was 95%)    Prior interrogations show episodes of fast VT 10/24/2018 terminated  with ATP; per record, ICD shock for VT 11/20/2018       Assessment :  1. Persistent atrial fibrillation, started when he was admitted to hospital with influenza, initially rapid ventricular rate now controlled. Unclear if afib is causing his intermittent lightheadedness which sounds more orthostatic. INR last week was subtherapeutic at 1.8. Cardioversion to sinus is reasonable, but will discuss with Dr. Montgomery first to see if LVAD adjustment may be indicated to see if symptoms improve.  2. VT, s/p CRT-D. His VF zone is low at 188 bpm for someone with LVAD, I would recommend increasing detection zone. Additionally, can consider turning off CRT to conserve battery life since he has LVAD.      Plan:  1. Weekly INRs, anticipate possible cardioversion  2. Will discuss with LVAD team regarding possible adjustment in settings/meds that may be contributing to his intermittent lightheadedness, which sounds orthostatic  3. He has an appointment with device clinic 4/3/2020 prior to seeing Dr. Montgomery - consider increasing VF detection to > 220 bpm with prolonged detection intervals; turning off LV pacing to conserve battery life. Will do to so only after discussion with LVAD team.        I spent a total of 30 minutes face to face with  Jim Willingham during today's office visit. Over 50% of this time was spent counseling the patient and/or coordinating care regarding management strategies.      The patient is to return pending decision on cardioversion . The patient understood the treatment plan as outlined above.  There were no barriers to learning.      Please do not hesitate to contact me if you have any questions/concerns.     Sincerely,     Darcie Gonzales MD

## 2020-03-05 NOTE — PROGRESS NOTES
I am delighted to see Jim Willingham in consultation for atrial fibrillation.    History of Present Illness:  As you know, the patient is a 64 year old  Male who was noted to have persistent Afib on his ICD interrogation.     He has nonischemic cardiomyopathy s/p destination LVAD 6/19/17, follows with Dr. Montgomery. Medtronic CRT-D initially implanted at [facility] 3/6/08. History of atrial fibrillation s/p cardioversion in 2011. Since then he's had paroxysmal episodes of Afib. In mid-January he was admitted to hospital with influenza, found to have afib with RVR. Since then, afib has been persistent.    He denies palpitations, but states that since mid January he's had frequently episodes of lightheadedness only when he's walking. He describes episodes as occurring when he gets up, starts walking, then feels weak, lightheaded, rests for a bit, then keeps going. He has some headaches with these episodes. Does not happen every time he walks - only half the time. Never has episodes when he's sitting or lying down. No chest discomfort, dyspnea, syncope. He's fairly active normally, takes care of grandkids.    The fllowing portions of the patient's history were reviewed and updated as appropriate: allergies, current medications, past family history, past medical history, past social history, past surgical history, and the problem list.    Past Medical History:  1. Atrial fibrillation, h/o cardioversion 1/14/2011.  2. Nonischemic cardiomyopathy, destination LVAD 6/19/2017.  3. Ventricular tachycardia s/p Medtronic CRT-D 3/6/2008, generator change 9/6/2013, all at [facility]. ICD shock for VT 2013, treated with amiodarone at that time. One shock since LVAD, on 11/20/2018 while he was sleeping, appropriate according to device clinic notes but I am unable to find interrogation data  4. CECILIA on CPAP  5. Diabetes  6. Carpal tunnel/arthritis, on chronic prednisone      Medications:   Aspirin 81 every day  Warfarin  Bumex  2 mg bid  Metoprolol succinate 25 bid  KCL 20 meq every day  Spironolactone 25 bid  Losartan 25 qd  Metformin 500/1000  Prednisone 20 every day  Inhalers    Allergies:    Allergies   Allergen Reactions     Beer Shortness Of Breath     Grass Shortness Of Breath     Ace Inhibitors Unknown     Dust Mites Other (See Comments)     Asthma     Mold Other (See Comments)     Asthma     Penicillins      Sulfa Drugs Unknown and Other (See Comments)     PN: unknown       Family History:   Family History   Problem Relation Age of Onset     Cerebrovascular Disease Mother      Diabetes Mother      Hypertension Mother      Coronary Artery Disease Father      Diabetes Type 2  Father        Psychosocial history:  reports that he quit smoking about 25 years ago. He has never used smokeless tobacco. He reports that he does not drink alcohol or use drugs.    Review of systems:   Cardiovascular: No palpitations, chest pain, shortness of breath at rest, dyspnea with exertion, orthopnea, paroxysmal nocturia dyspnea, nocturia, dizziness, syncope.    In addition,   Constitutional: No change in weight, sleep or appetite.  Normal energy.  No fever or chills  Eyes: Negative for vision changes or eye problems  ENT: No problems with ears, nose or throat.  No difficulty swallowing.  Resp: No coughing, wheezing or shortness of breath  GI: No nausea, vomiting,  heartburn, abdominal pain, diarrhea, constipation or change in bowel habits  : No urinary frequency or dysuria, bladder or kidney problems  Musculoskeletal: No significant muscle or joint pains  Neurologic: No headaches, numbness, tingling, weakness, problems with balance or coordination  Psychiatric: No problems with anxiety, depression or mental health  Heme/immune/allergy: No history of bleeding or clotting problems or anemia.  No allergies or immune system problems  Integumentary: No rashes,worrisome lesions or skin problems      Physical examination  Vitals: Pulse 87   Temp 97.7  F  (36.5  C) (Oral)   Resp 16   Wt 116.8 kg (257 lb 8 oz)   SpO2 97%   BMI 39.15 kg/m    BMI= Body mass index is 39.15 kg/m .    Constitutional: In general, the patient is a pleasant male in no apparent distress.    Eyes: PERRLA.  EOMI.  Sclerae white, not injected.  ENT/mouth: Normiocephalic and atraumatic.  Nares clear.  Pharynx without erythema or exudate.  Dentition intact.  No adenopathy.  No thyromegaly. Carotids +2/2 bilaterally without bruits.  No jugular venous distension.   Card/Vasc: Unable to assess due to LVAD  Respiratory: Clear to asculation.  No ronchi, wheezes, rales.  No dullness to percussion.   GI: Abdomen is soft, nontender, nondistended. No organomegaly. No AAA.  No bruits.   Integument: No significant bruises or rashes  Neurological: The neurological examination reveal a patient who was oriented to person, place, and time.    Psych: Normal  Heme/Lymph/Immun: no significant adenopathy      I have reviewed the following labs/imaging:  Labs: 2/13/2020: hgb 11, plt 153K, ALT 89, AST 50  2/17/2020: K 4.1, cr 1.23    INR 2/25/2020: 1.8    I have personally and independently reviewed the following:  EKGs:   today 3/5/2020: afib 100 bpm with biventricular pacing  1/15/2020: afib 140 bpm  1/21/2020: afib 144 bpm    ICD interrogation in clinic 2/13/2020:  Medtronic CRT-D, DDDR  bpm, VF > 188 bpm  Estimated battery longevity 18 months  Underlying rhythm is afib with native conduction at 100 bpm  Persistent afib since mid January  BIV pacing 56% (previously when in SR, BIV pacing was 95%)    Prior interrogations show episodes of fast VT 10/24/2018 terminated with ATP; per record, ICD shock for VT 11/20/2018       Assessment :  1. Persistent atrial fibrillation, started when he was admitted to hospital with influenza, initially rapid ventricular rate now controlled. Unclear if afib is causing his intermittent lightheadedness which sounds more orthostatic. INR last week was subtherapeutic at 1.8.  Cardioversion to sinus is reasonable, but will discuss with Dr. Montgomery first to see if LVAD adjustment may be indicated to see if symptoms improve.  2. VT, s/p CRT-D. His VF zone is low at 188 bpm for someone with LVAD, I would recommend increasing detection zone. Additionally, can consider turning off CRT to conserve battery life since he has LVAD.      Plan:  1. Weekly INRs, anticipate possible cardioversion  2. Will discuss with LVAD team regarding possible adjustment in settings/meds that may be contributing to his intermittent lightheadedness, which sounds orthostatic  3. He has an appointment with device clinic 4/3/2020 prior to seeing Dr. Montgomery - consider increasing VF detection to > 220 bpm with prolonged detection intervals; turning off LV pacing to conserve battery life. Will do to so only after discussion with LVAD team.        I spent a total of 30 minutes face to face with  Jim Willingham during today's office visit. Over 50% of this time was spent counseling the patient and/or coordinating care regarding management strategies.      The patient is to return pending decision on cardioversion . The patient understood the treatment plan as outlined above.  There were no barriers to learning.      Darcie Gonzales MD       Addendum:  Discussed personally with Dr. Montgomery.   Will plan the followin. Cardioversion when weekly INRs are > 2 for consecutive 3 weeks; will defer antiarrhythmics for now   2. He has device clinic appointment on 4/3/2020: at that time, will reprogram CRT-D as follows to conserve battery life:   Change mode to AAIR+  bpm   Turn LV pacing OFF   Increase VF detection rate to 220 bpm (NID already at 30/40)       JESSICA  3/9/2020

## 2020-03-05 NOTE — PROGRESS NOTES
Addendum 3/9/20 Message is received from Dr. Gonzales today.     Darcie Gonzales MD Nordstrom, Erin J, REGINALD; Maru Alonso, REGINALD; Theresa Lindquist; Didi Butler PA-C; [facility] Cardiology Device Nurse-               Update to all for plans for this LVAD patient, I discussed with Dr. Montgomery this mornin. INRs weekly, then cardioversion when INRs > 2 for 3 consecutive weeks     2. He has device clinic appt already scheduled for 4/3/2020 - at that time, can you make the following changes:       - change pacing mode to AAIR+  bpm       - turn LV pacing OFF       - change VF detection zone to > 220 bpm     I'll send a note via [employer] to confirm with patient of our plans for cardioversion.                        ANTICOAGULATION FOLLOW-UP CLINIC VISIT    Patient Name:  Jim Willingham  Date:  3/5/2020  Contact Type:  Telephone    SUBJECTIVE:  Patient Findings     Comments:   Patient will plan to have his INR checked on Monday and will NOT go in over the weekend as LVAD protocol dictates.         Clinical Outcomes     Comments:   Patient will plan to have his INR checked on Monday and will NOT go in over the weekend as LVAD protocol dictates.            OBJECTIVE    INR   Date Value Ref Range Status   2020 1.67 (H) 0.86 - 1.14 Final       ASSESSMENT / PLAN  No question data found.  Anticoagulation Summary  As of 3/5/2020    INR goal:   2.0-3.0   TTR:   72.1 % (11.7 mo)   INR used for dosin.67! (3/5/2020)   Warfarin maintenance plan:   7.5 mg (5 mg x 1.5) every day   Full warfarin instructions:   3/5: 10 mg; 3/6: 10 mg; Otherwise 7.5 mg every day   Weekly warfarin total:   52.5 mg   Plan last modified:   Kirsty Bermudez RN (1/3/2020)   Next INR check:   3/9/2020   Priority:   Critical   Target end date:       Indications    LVAD (left ventricular assist device) present (H) [Z95.811]  Long-term (current) use of anticoagulants [Z79.01] [Z79.01]             Anticoagulation Episode Summary     INR check  location:       Preferred lab:       Send INR reminders to:   [facility]    Comments:   HIPPA Forms mailed 6/26/17  Labs drawn either at [facility] or [facility]  LVAD placed 6/19/17   II  ASA 81 mg daily      Anticoagulation Care Providers     Provider Role Specialty Phone number    Delisa Montgomery MD Responsible Cardiology [phone]            See the Encounter Report to view Anticoagulation Flowsheet and Dosing Calendar (Go to Encounters tab in chart review, and find the Anticoagulation Therapy Visit)    Spoke with patient.     Maru Alonso, RN

## 2020-03-09 ENCOUNTER — CARE COORDINATION (OUTPATIENT)
Dept: CARDIOLOGY | Facility: CLINIC | Age: 65
End: 2020-03-09

## 2020-03-09 ENCOUNTER — ANTICOAGULATION THERAPY VISIT (OUTPATIENT)
Dept: ANTICOAGULATION | Facility: CLINIC | Age: 65
End: 2020-03-09

## 2020-03-09 DIAGNOSIS — Z79.01 LONG TERM CURRENT USE OF ANTICOAGULANT THERAPY: ICD-10-CM

## 2020-03-09 DIAGNOSIS — Z95.811 LVAD (LEFT VENTRICULAR ASSIST DEVICE) PRESENT (H): ICD-10-CM

## 2020-03-09 LAB — INR PPP: 3.04 (ref 0.86–1.14)

## 2020-03-09 PROCEDURE — 85610 PROTHROMBIN TIME: CPT | Performed by: INTERNAL MEDICINE

## 2020-03-09 PROCEDURE — 36415 COLL VENOUS BLD VENIPUNCTURE: CPT | Performed by: INTERNAL MEDICINE

## 2020-03-09 NOTE — TELEPHONE ENCOUNTER
Darcie Gonzales MD               Update to all for plans for this LVAD patient, I discussed with Dr. Montgomery this mornin. INRs weekly, then cardioversion when INRs > 2 for 3 consecutive weeks     2. He has device clinic appt already scheduled for 4/3/2020 - at that time, can you make the following changes:       - change pacing mode to AAIR+  bpm       - turn LV pacing OFF       - change VF detection zone to > 220 bpm

## 2020-03-09 NOTE — PROGRESS NOTES
ANTICOAGULATION FOLLOW-UP CLINIC VISIT    Patient Name:  Jim Willingham  Date:  3/9/2020  Contact Type:  Telephone    SUBJECTIVE:  Patient Findings     Comments:   Updated comment section with instructions for upcoming Cardioversion.  Dr. Gonzales wants INR's weekly, >2.0 for 3 consecutive draws.        Clinical Outcomes     Comments:   Updated comment section with instructions for upcoming Cardioversion.  Dr. Gonzales wants INR's weekly, >2.0 for 3 consecutive draws.           OBJECTIVE    INR   Date Value Ref Range Status   03/09/2020 3.04 (H) 0.86 - 1.14 Final       ASSESSMENT / PLAN  INR assessment THER    Recheck INR In: 10 DAYS    INR Location Clinic      Anticoagulation Summary  As of 3/9/2020    INR goal:   2.0-3.0   TTR:   71.8 % (11.7 mo)   INR used for dosing:   3.04! (3/9/2020)   Warfarin maintenance plan:   7.5 mg (5 mg x 1.5) every day   Full warfarin instructions:   7.5 mg every day   Weekly warfarin total:   52.5 mg   No change documented:   Brendan Montoya RN   Plan last modified:   Kirsty Bermudez RN (1/3/2020)   Next INR check:   3/19/2020   Priority:   Critical   Target end date:       Indications    LVAD (left ventricular assist device) present (H) [Z95.811]  Long-term (current) use of anticoagulants [Z79.01] [Z79.01]             Anticoagulation Episode Summary     INR check location:       Preferred lab:       Send INR reminders to:   [facility]    Comments:   HIPPA Forms mailed 6/26/17  Labs drawn either at [facility] or [facility], See 3/5/20 ADDENDUM, pt. weekly INR >2.0 for Cardioversion  LVAD placed 6/19/17   II  ASA 81 mg daily      Anticoagulation Care Providers     Provider Role Specialty Phone number    Delisa Montgomery MD Responsible Cardiology [phone]            See the Encounter Report to view Anticoagulation Flowsheet and Dosing Calendar (Go to Encounters tab in chart review, and find the Anticoagulation Therapy Visit)    INR/CFX/F2 RESULT:INR result is  3.04    ASSESSMENT:Left message for patient with results and dosing recommendations. Asked patient to call back to report any missed doses, falls, signs and symptoms of bleeding or clotting, any changes in health, medication, or diet. Asked patient to call back with any questions or concerns.    DOSING ADJUSTMENT: 10mg on Sat, 7.5 all other days.    NEXT INR/FACTOR X OR FACTOR II:3/19    PROTOCOL FOLLOWED:LVAD goal 2-3  Patient had LVAD placed on:   6/19/17  Type of LVAD: HM 2  Patient's current Aspirin dose: 81mg  LVAD Protocol followed:  Yes.   If Not Followed Explanation:  Brendan Garcia, RN

## 2020-03-15 ENCOUNTER — HEALTH MAINTENANCE LETTER (OUTPATIENT)
Age: 65
End: 2020-03-15

## 2020-03-16 DIAGNOSIS — I50.22 CHRONIC SYSTOLIC CONGESTIVE HEART FAILURE (H): Primary | ICD-10-CM

## 2020-03-16 LAB
ANION GAP SERPL CALCULATED.3IONS-SCNC: 12 MMOL/L
BUN SERPL-MCNC: 31 MG/DL
CALCIUM SERPL-MCNC: 8 MG/DL
CHLORIDE SERPLBLD-SCNC: 98 MMOL/L
CO2 SERPL-SCNC: 24 MMOL/L
CREAT SERPL-MCNC: 1.3 MG/DL
GFR SERPL CREATININE-BSD FRML MDRD: ABNORMAL ML/MIN/{1.73_M2}
GLUCOSE SERPL-MCNC: 271 MG/DL (ref 70–99)
INR PPP: 2.9 (ref 0.9–1.1)
POTASSIUM SERPL-SCNC: 3.9 MMOL/L
SODIUM SERPL-SCNC: 134 MMOL/L

## 2020-03-17 ENCOUNTER — ANTICOAGULATION THERAPY VISIT (OUTPATIENT)
Dept: ANTICOAGULATION | Facility: CLINIC | Age: 65
End: 2020-03-17

## 2020-03-17 DIAGNOSIS — Z95.811 LVAD (LEFT VENTRICULAR ASSIST DEVICE) PRESENT (H): ICD-10-CM

## 2020-03-17 DIAGNOSIS — Z79.01 LONG TERM CURRENT USE OF ANTICOAGULANT THERAPY: ICD-10-CM

## 2020-03-17 NOTE — PROGRESS NOTES
ANTICOAGULATION FOLLOW-UP CLINIC VISIT    Patient Name:  Jim Willingham  Date:  3/17/2020  Contact Type:  Telephone    SUBJECTIVE:         OBJECTIVE    INR   Date Value Ref Range Status   2020 2.9 (A) 0.90 - 1.10 Final       ASSESSMENT / PLAN  INR assessment THER    Recheck INR In: 2 WEEKS    INR Location Clinic      Anticoagulation Summary  As of 3/17/2020    INR goal:   2.0-3.0   TTR:   71.1 % (11.6 mo)   INR used for dosin.9 (3/16/2020)   Warfarin maintenance plan:   10 mg (5 mg x 2) every Fri; 7.5 mg (5 mg x 1.5) all other days   Full warfarin instructions:   10 mg every Fri; 7.5 mg all other days   Weekly warfarin total:   55 mg   Plan last modified:   Delisa Hillman RN (3/17/2020)   Next INR check:   3/31/2020   Priority:   Critical   Target end date:       Indications    LVAD (left ventricular assist device) present (H) [Z95.811]  Long-term (current) use of anticoagulants [Z79.01] [Z79.01]             Anticoagulation Episode Summary     INR check location:       Preferred lab:       Send INR reminders to:   [facility]    Comments:   HIPPA Forms mailed 17  Labs drawn either at [facility] or [facility], See 3/5/20 ADDENDUM, pt. weekly INR >2.0 for Cardioversion  LVAD placed 17   II  ASA 81 mg daily      Anticoagulation Care Providers     Provider Role Specialty Phone number    Ulises Delisa Sprague MD Responsible Cardiology [phone]            See the Encounter Report to view Anticoagulation Flowsheet and Dosing Calendar (Go to Encounters tab in chart review, and find the Anticoagulation Therapy Visit)  Spoke with patient  Patient had LVAD placed on:   17  Type of LVAD: HM2  Patient's current Aspirin dose: 81mg  LVAD Protocol followed:  Yes  Delisa Hillman RN

## 2020-03-18 ENCOUNTER — CARE COORDINATION (OUTPATIENT)
Dept: CARDIOLOGY | Facility: CLINIC | Age: 65
End: 2020-03-18

## 2020-03-18 NOTE — PROGRESS NOTES
Called pt to check in. Pt has appt set up for tomorrow am in cardiology clinic. He reported that he saw his PCP on Monday. Pt continues to have some weakness and near black out symptoms. They severity and frequency has decreased since we last saw him in clinic. He discussed theses symptoms with his PCP, who recommended that pt split up his losartan to 12.5mg BID. Since the switch, he has had less weakness/feeling like his muscles are giving out. Pt will discuss this at his telephone visit tomorrow with ROGER Rivera.

## 2020-03-19 ENCOUNTER — VIRTUAL VISIT (OUTPATIENT)
Dept: CARDIOLOGY | Facility: CLINIC | Age: 65
End: 2020-03-19
Attending: INTERNAL MEDICINE
Payer: COMMERCIAL

## 2020-03-19 ENCOUNTER — DOCUMENTATION ONLY (OUTPATIENT)
Dept: CARDIOLOGY | Facility: CLINIC | Age: 65
End: 2020-03-19

## 2020-03-19 DIAGNOSIS — Z95.810 ICD (IMPLANTABLE CARDIOVERTER-DEFIBRILLATOR), BIVENTRICULAR, IN SITU: ICD-10-CM

## 2020-03-19 DIAGNOSIS — Z95.811 LVAD (LEFT VENTRICULAR ASSIST DEVICE) PRESENT (H): ICD-10-CM

## 2020-03-19 DIAGNOSIS — I48.19 PERSISTENT ATRIAL FIBRILLATION (H): Primary | ICD-10-CM

## 2020-03-19 DIAGNOSIS — N18.30 CKD (CHRONIC KIDNEY DISEASE) STAGE 3, GFR 30-59 ML/MIN (H): ICD-10-CM

## 2020-03-19 DIAGNOSIS — I50.22 CHRONIC SYSTOLIC CONGESTIVE HEART FAILURE (H): ICD-10-CM

## 2020-03-19 DIAGNOSIS — Z79.01 LONG TERM CURRENT USE OF ANTICOAGULANT THERAPY: ICD-10-CM

## 2020-03-19 DIAGNOSIS — R42 DIZZINESS: ICD-10-CM

## 2020-03-19 PROCEDURE — 99214 OFFICE O/P EST MOD 30 MIN: CPT | Mod: TEL | Performed by: PHYSICIAN ASSISTANT

## 2020-03-19 NOTE — PATIENT INSTRUCTIONS
Take your medicines every day, as directed    Changes made today:  o No medication changes  o    Monitor Your Weight and Symptoms    Contact us if you:      Gain 2 pounds in one day or 5 pounds in one week    Feel more short of breath    Notice more leg swelling    Feel lightheadeded   Change your lifestyle    Limit Salt or Sodium:    2000 mg  Limit Fluids:    2000 mL or approximately 64 ounces  Eat a Heart Healthy Diet    Low in saturated fats  Stay Active:    Aim to move at least 150 minutes every  week         To Contact us    During Business Hours:  [phone], option # 1 (University)  Then option # 4 (medical questions)     After hours, weekends or holidays:   [phone], Option #4  Ask to speak to the On-Call Cardiologist. Inform them you are a CORE/heart failure patient at the [facility].     Use [employer] allows you to communicate directly with your heart team through secure messaging.    naaptol can be accessed any time on your phone, computer, or tablet.    If you need assistance, we'd be happy to help!         Keep your Heart Appointments:    4/3 with Dr. Montgomery as described

## 2020-03-19 NOTE — LETTER
"3/19/2020      RE: Jim Willingham  [address] [facility] [address]       Dear Colleague,    Thank you for the opportunity to participate in the care of your patient, Jim Willingham, at the [facility]. Please see a copy of my visit note below.    Jim Willingham is a 64 year old male who is being evaluated via a billable telephone visit.      The patient has been notified of following:     \"This telephone visit will be conducted via a call between you and your physician/provider. We have found that certain health care needs can be provided without the need for a physical exam.  This service lets us provide the care you need with a short phone conversation.  If a prescription is necessary we can send it directly to your pharmacy.  If lab work is needed we can place an order for that and you can then stop by our lab to have the test done at a later time.    If during the course of the call the physician/provider feels a telephone visit is not appropriate, you will not be charged for this service.\"     Jim Willingham complains of LVAD follow-up.    I have reviewed and updated the patient's Past Medical History, Social History, Family History and Medication List.    ALLERGIES  Beer; Grass; Ace inhibitors; Dust mites; Mold; Penicillins; and Sulfa drugs    Phone call duration:   - Start time: 8:50 am  - End time: 9:16 am      HPI:   Jim Willingham is a 64 year old male with a past medical history of NICM (EF 20%) s/p HM II LVAD placement (6/19/17) at [facility] (obesity) Other relavant history includes a. Fib, chronic kidney disease stage III, diabetes mellitus type 2, RV failure, CECILIA, obesity, hypertension, COPD, asthma. He is here for heart failure and LVAD follow up.     He was admitted from 1/20/2020 to 1/24/2020 for Influenza with COPD exacerbation. He had also been admited from 1/15-1/16 for similar symptoms and required bipap at that time. He was somewhat " hypovolmic on dischrge. He was discharged on a steroid taper, he is now on prednisone 20 mg daily which was his chronic dose pre-hospitalization. His losartan was decreased. He was discharged at 250 lbs, was felt to be slightly hypovolemic at that time.     Last visit: 2/13/2020 with Didi Butler PA-C  He was having a lot of dizziness. This had been an ongoing issue since he left the hospital. On the day he left the hospital, his weight was around 244. He checked it a week later and he was up to 254. His bumex was then increased back to 2 mg BID and he has been loosing weight quite consistently- down about 4-5 lbs and feeling much better. At this appointment he was found to be in 100% a fib, had had much lower burden in the past.    This visit  Jim is feeling considerably better than at his last appointment.  At his last appointment he was having severe dizzy spells with standing almost 75% of the time.  After decreasing his metoprolol this decreased to only 40% of the time.  Recently he saw his primary care provider who decreased his losartan and now he is only having the spells 25% of the time.  They are generally quite manageable and much less severe than they used to be.    He does not have any shortness of breath at rest.  He has not been walking much due to needing to stay in the house but he has been walking around the house without any dyspnea on exertion.  He has no orthopnea.  No PND.  He has a little bit of lower extremity edema but it is been resolving at night.  No abdominal swelling.  His weights have been stable around 2 .  These are good weights for him.  No palpitations, chest pain, changes to his appetite.  No blood in the urine or blood in the stool.  No driveline redness, drainage, pain.  He has a very mild headache which is been ongoing since he left the hospital in January.  Usually a 3 out of 10 occasionally up to a 5 out of 10.  No associated neurologic symptoms.  No facial  droop, no weakness on one side of his body no confusion, no other symptoms.  Does not respond much to Tylenol.  No fevers or chills.    Cardiac Medications  ASA 81 mg daily  Coumadin  Bumex 2 mg BID  KCl 20 meq daily  Losartan 12.5 mg BID  Metoprolol 25 mg BID  Aldactone 25 mg BID    PAST MEDICAL HISTORY:  Past Medical History:   Diagnosis Date     Benign essential hypertension 2017     Cardiomyopathy, unspecified (H) 2017     CKD (chronic kidney disease) stage 3, GFR 30-59 ml/min (H) 2017     Depression 2017     Diabetes mellitus (H) 2017     H/O gastric bypass 2017     ICD (implantable cardioverter-defibrillator), biventricular, in situ 2017     LVAD (left ventricular assist device) present (H)      NICM (nonischemic cardiomyopathy) (H)/ EF 20% 2017    ECHO: LVEDd. 7.66 cm, Restrictive pattern , Severe mitral valve regurgitation     CECILIA (obstructive sleep apnea) 2017     Paroxysmal atrial fibrillation (H) 2017     Paroxysmal VT (H) 2017     Uncomplicated asthma      Vitamin B12 deficiency (non anemic) 2017       FAMILY HISTORY:  Family History   Problem Relation Age of Onset     Cerebrovascular Disease Mother      Diabetes Mother      Hypertension Mother      Coronary Artery Disease Father      Diabetes Type 2  Father        SOCIAL HISTORY:  Social History     Socioeconomic History     Marital status:      Spouse name: Not on file     Number of children: Not on file     Years of education: Not on file     Highest education level: Not on file   Occupational History     Not on file   Social Needs     Financial resource strain: Not on file     Food insecurity:     Worry: Not on file     Inability: Not on file     Transportation needs:     Medical: Not on file     Non-medical: Not on file   Tobacco Use     Smoking status: Former Smoker     Last attempt to quit:      Years since quittin.1     Smokeless tobacco: Never Used   Substance and Sexual  Activity     Alcohol use: No     Drug use: No     Sexual activity: Yes     Partners: Female   Lifestyle     Physical activity:     Days per week: Not on file     Minutes per session: Not on file     Stress: Not on file   Relationships     Social connections:     Talks on phone: Not on file     Gets together: Not on file     Attends Hindu service: Not on file     Active member of club or organization: Not on file     Attends meetings of clubs or organizations: Not on file     Relationship status: Not on file     Intimate partner violence:     Fear of current or ex partner: Not on file     Emotionally abused: Not on file     Physically abused: Not on file     Forced sexual activity: Not on file   Other Topics Concern     Parent/sibling w/ CABG, MI or angioplasty before 65F 55M? No   Social History Narrative     Not on file       CURRENT MEDICATIONS:  acetaminophen (TYLENOL) 325 MG tablet, Take 650 mg by mouth every 6 hours as needed for mild pain  albuterol (ALBUTEROL) 108 (90 BASE) MCG/ACT Inhaler, Inhale 2 puffs into the lungs every 4 hours as needed for shortness of breath / dyspnea or wheezing  allopurinol (ZYLOPRIM) 100 MG tablet, Take 1 tablet (100 mg) by mouth daily  aspirin 81 MG chewable tablet, 1 tablet (81 mg) by Oral or Feeding Tube route daily  blood glucose monitoring (ONE TOUCH ULTRA 2) meter device kit, Use to test blood sugar four times daily or as directed.  blood glucose VI test strip, Use to test blood sugar four times daily or as directed.  bumetanide (BUMEX) 1 MG tablet, Take 2 tablets (2 mg) by mouth 2 times daily  citalopram (CELEXA) 20 MG tablet, Take 20 mg by mouth daily  cyanocobalamin (VITAMIN B12) 1000 MCG/ML injection, Inject 1,000 mcg into the muscle every 30 days  fluticasone-vilanterol (BREO ELLIPTA) 200-25 MCG/INH oral inhaler, Inhale 1 puff into the lungs daily  gabapentin (NEURONTIN) 300 MG capsule, Take 1 capsule (300 mg) by mouth twice daily and 2 capsules (600 mg) by mouth at  bedtime daily.  insulin lispro (HUMALOG KWIKPEN) 100 UNIT/ML (1 unit dial) pen, Inject 1-7 Units Subcutaneous 4 times daily  insulin pen needle (32G X 4 MM) 32G X 4 MM miscellaneous, Use four pen needles daily or as directed.  levalbuterol (XOPENEX) 1.25 MG/3ML neb solution, Take 3 mLs (1.25 mg) by nebulization every 6 hours as needed for shortness of breath / dyspnea or wheezing  losartan (COZAAR) 25 MG tablet, Take 1 tablet (25 mg) by mouth 2 times daily  metFORMIN (GLUCOPHAGE) 500 MG tablet, Take 1 tablet (500 mg) by mouth 2 times daily (with meals)  metoprolol succinate ER (TOPROL-XL) 25 MG 24 hr tablet, Take 1 tablet (25 mg) by mouth 2 times daily  montelukast (SINGULAIR) 10 MG tablet, Take 10 mg by mouth At Bedtime  pantoprazole (PROTONIX) 40 MG EC tablet, Take 1 tablet (40 mg) by mouth every morning  potassium chloride ER (K-TAB/KLOR-CON) 10 MEQ CR tablet, Take 2 tablets (20 mEq) by mouth daily  predniSONE (DELTASONE) 20 MG tablet, Take 1 tablet (20 mg) by mouth daily  spironolactone (ALDACTONE) 25 MG tablet, Take 25 mg by mouth 2 times daily  traMADol (ULTRAM) 50 MG tablet, Take 2 tablets (100 mg) by mouth every 6 hours as needed for moderate pain or breakthrough pain  umeclidinium (INCRUSE ELLIPTA) 62.5 MCG/INH oral inhaler, Inhale 1 puff into the lungs daily  warfarin (COUMADIN) 5 MG tablet, Take 5 - 7.5mg daily or as directed by coumadin clinic.  zinc sulfate (ZINCATE) 220 (50 ZN) MG capsule, Take 220 mg by mouth 2 times daily    No current facility-administered medications on file prior to visit.       ROS:   See HPI    EXAM:  There were no vitals taken for this visit.  N/A telephone visit    Labs - as reviewed in clinic with patient today:  Telephone visit, labs being faxed    Diagnostics  2/13/2020 ICD Check  Patient seen in clinic for evaluation and iterative programming of his Medtronic Bi-Ventricular ICD per MD orders. Patient has an appointment to see ROGER Yeboah today.  Normal ICD  function.  2 AT/AF episodes recorded since 1/21/20.  AF burden = 100%.  Patient takes Warfarin.  No ventricular arrhythmias recorded.  Intrinsic rhythm = AF, v-sensed @  bpm.  AP = 0.2%.  BVP = 56%.  OptiVol fluid index is elevated above baseline, ongoing since 12/14/19.   Estimated battery longevity to MARVA = 18 months.  Battery voltage = 2.9V.   No short v-v intervals recorded.  Lead trends appear stable. Patient reports that he has been having periodic dizzy spells in the last few weeks, typically when he is walking.  No ventricular arrhythmias recorded.  VT monitor zone reprogrammed from 150 to 140 bpm.  AF alerts programmed off. Plan for patient to return to clinic on 4/3/20 as scheduled.  GLADYS Nash RN.       Multi lead ICD    1/20/2020 [facility]  Interpretation Summary  Technically difficult study. Patients heart rate is in the 120s-150s during  the study.  HM2 LVAD is present and is funcioning at 9400RPM     Severely (EF 10-15%) reduced left ventricular function is present. Severe left  ventricular dilation is present. LVIDd is 6.6cm LVAD cannula in the apex is  not well visualized. Inflow and outflow velocities could not be obtained.  Aortic valve appears to open minimally with each beat. Septum is probably  midline but again difficult to visualize.  Global right ventricular function is moderately to severely reduced.  IVC diameter >2.1 cm collapsing <50% with sniff suggests a high RA pressure  estimated at 15 mmHg or greater. No pericardial effusion is present.     Compared to prior study on 1/16/20, LVIDd appears slightly larger. Patient is  severely tachycardic now. IVC appears more dilated.    7/24/2019 [facility]   Time  Systolic  Diastolic  Mean  A Wave  V Wave  EDP  Max dp/dt  HR    RA Pressures  10:04 AM    10 mmHg     91014 mmHg     26298 mmHg       55 bpm       RV Pressures   9:42 AM        384 mmHg/sec         10:01 AM  25 mmHg         10 mmHg      39 bpm       PA Pressures  10:01 AM  25 mmHg     16 mmHg      19 mmHg         80 bpm       PCW Pressures  10:02 AM    11 mmHg     85011 mmHg     66606 mmHg       68 bpm          Time  TDCO  TDCI  Kee C.O.  Kee C.I.  Kee HR    Cardiac Output Results   9:42 AM  4.63 L/min     2.02 L/min/m2     4.62 L/min     2.01 L/min/m2            _____________________________________________________________________________    Assessment and Plan:   Jim Willingham is a 64 year old male with a past medical history of NICM (EF 20%) s/p HM II LVAD placement (6/19/17) at  (obesity) Other relavant history includes chronic kidney disease stage III, diabetes mellitus type 2, RV failure, CECILIA, obesity, hypertension, COPD, asthma. He is here for heart failure and LVAD follow up via telephone given ongoing COVID 19 outbreak.    We had originally planned doing today as an in person visit.  However patient was quite concerned about coming into clinic in the setting of COVID 19 outbreak.  We have changed with home visit.  He understands that there are risks of not been able to have vital signs or physical exam.    When I saw him a month or 2 ago he was having severe bouts of dizziness and all over body weakness.  This is had quite a drastic improvement with a decrease in his metoprolol and a decrease in his losartan.  He was formally having the symptoms 75% of the time when he changed position, now down to 25% of the time and does not seem so severe.  No falls.  We had extensive conversation about the risks and benefits of decreasing the metoprolol or the losartan further without being able to see him in clinic or to get a good blood pressure reading.  We discussed that we know that being hypertensive with his valve will put him at a higher risk for stroke.  Given how drastically his symptoms have improved we will hold off on any further medication changes until we can see him in person.  Reassuring that he is feeling so much better.  No headaches or vision changes to suggest hypertension.    He is  now having persistent A. fib.  He saw Dr. Gonzales.  Not convinced that this is the cause of his symptoms but we will plan on a cardioversion.  He will get weekly INRs until this can be achieved.  I agree that his symptoms may not be being caused by the atrial fibrillation, however going back into sinus rhythm may give us more flexibility with his metoprolol, and decreasing that did significantly improve his symptoms.      #  Chronic systolic heart failure secondary to NICM  Stage D  NYHA Class IIIB  ACEi/ARB: Continue Losartan 12.5 mg BID, recently decreased by PCP  BB: Continue metoprolol 25 mg BID given bradycardia on standing  Aldosterone antagonist: Spironolactone 25 mg daily   SCD prophylaxis: ICD  Fluid status:  Euvolemic by history- continue bumex 2 mg BID (has been diuresing well on this)     #  S/P LVAD implant as [facility] due to obesity. Goal is to loose weight to be eligible for transplant.  Anticoagulation: Warfarin with INR goal of 2-3.  Antiplatelet: Aspirin 81 mg daily  MAP: Goal 65-85 no reading today given telephone visit  LDH: Stable on most recent check  VAD Interrogation today:  NA a telephone visit     #  Morbid obesity: BMI >40.  Recommend weight loss with a goal weight of 255.  Weight loss clinic referral has been placed previously.    # Dizziness  Dizziness since being diagnosed with the flu.  Has improved very significantly with decreases in his metoprolol and losartan.  Very orthostatic by history but when he was in clinic a month ago his orthostatic vital signs were negative.  Of interest he did have bradycardia and dizziness on standing.  He also is newly in persistent A. fib, previously was paroxysmal.  Unclear how much impact this is having on his symptoms.  -No medication changes as described above.  -Potentially could decrease metoprolol further after cardioversion  -If symptoms are still ongoing after medication changes may benefit from another echo or right heart cath, he may need speech  change.  Given how much he is improved will defer this for now given the risk of exposing him to COVID 19  - Discussed fall precautions    # A. Fib  100% burden on ICD check.  -Planning for cardioversion with Dr. Gonzales in the next few weeks if the COVID 19 situation allows for this  - A/C as above     # CECILIA: Continue CPAP.       # Chronic kidney disease: Creatinine at baseline today.  Baseline creatinine in the past year 1.2-1.4.       MICHEL Yeboah REBECCA JANE    Please do not hesitate to contact me if you have any questions/concerns.     Sincerely,     Didi Butler PA-C

## 2020-03-19 NOTE — PROGRESS NOTES
"Jim Willingham is a 64 year old male who is being evaluated via a billable telephone visit.      The patient has been notified of following:     \"This telephone visit will be conducted via a call between you and your physician/provider. We have found that certain health care needs can be provided without the need for a physical exam.  This service lets us provide the care you need with a short phone conversation.  If a prescription is necessary we can send it directly to your pharmacy.  If lab work is needed we can place an order for that and you can then stop by our lab to have the test done at a later time.    If during the course of the call the physician/provider feels a telephone visit is not appropriate, you will not be charged for this service.\"     Jim Willingham complains of LVAD follow-up.    I have reviewed and updated the patient's Past Medical History, Social History, Family History and Medication List.    ALLERGIES  Beer; Grass; Ace inhibitors; Dust mites; Mold; Penicillins; and Sulfa drugs    Phone call duration:   - Start time: 8:50 am  - End time: 9:16 am      HPI:   Jim Willingham is a 64 year old male with a past medical history of NICM (EF 20%) s/p HM II LVAD placement (6/19/17) at [facility] (obesity) Other relavant history includes a. Fib, chronic kidney disease stage III, diabetes mellitus type 2, RV failure, CECILIA, obesity, hypertension, COPD, asthma. He is here for heart failure and LVAD follow up.     He was admitted from 1/20/2020 to 1/24/2020 for Influenza with COPD exacerbation. He had also been admited from 1/15-1/16 for similar symptoms and required bipap at that time. He was somewhat hypovolmic on dischrge. He was discharged on a steroid taper, he is now on prednisone 20 mg daily which was his chronic dose pre-hospitalization. His losartan was decreased. He was discharged at 250 lbs, was felt to be slightly hypovolemic at that time.     Last visit: 2/13/2020 with Didi Butler PA-C  He was " having a lot of dizziness. This had been an ongoing issue since he left the hospital. On the day he left the hospital, his weight was around 244. He checked it a week later and he was up to 254. His bumex was then increased back to 2 mg BID and he has been loosing weight quite consistently- down about 4-5 lbs and feeling much better. At this appointment he was found to be in 100% a fib, had had much lower burden in the past.    This visit  Jim is feeling considerably better than at his last appointment.  At his last appointment he was having severe dizzy spells with standing almost 75% of the time.  After decreasing his metoprolol this decreased to only 40% of the time.  Recently he saw his primary care provider who decreased his losartan and now he is only having the spells 25% of the time.  They are generally quite manageable and much less severe than they used to be.    He does not have any shortness of breath at rest.  He has not been walking much due to needing to stay in the house but he has been walking around the house without any dyspnea on exertion.  He has no orthopnea.  No PND.  He has a little bit of lower extremity edema but it is been resolving at night.  No abdominal swelling.  His weights have been stable around 2 .  These are good weights for him.  No palpitations, chest pain, changes to his appetite.  No blood in the urine or blood in the stool.  No driveline redness, drainage, pain.  He has a very mild headache which is been ongoing since he left the hospital in January.  Usually a 3 out of 10 occasionally up to a 5 out of 10.  No associated neurologic symptoms.  No facial droop, no weakness on one side of his body no confusion, no other symptoms.  Does not respond much to Tylenol.  No fevers or chills.    Cardiac Medications  ASA 81 mg daily  Coumadin  Bumex 2 mg BID  KCl 20 meq daily  Losartan 12.5 mg BID  Metoprolol 25 mg BID  Aldactone 25 mg BID    PAST MEDICAL HISTORY:  Past Medical  History:   Diagnosis Date     Benign essential hypertension 2017     Cardiomyopathy, unspecified (H) 2017     CKD (chronic kidney disease) stage 3, GFR 30-59 ml/min (H) 2017     Depression 2017     Diabetes mellitus (H) 2017     H/O gastric bypass 2017     ICD (implantable cardioverter-defibrillator), biventricular, in situ 2017     LVAD (left ventricular assist device) present (H)      NICM (nonischemic cardiomyopathy) (H)/ EF 20% 2017    ECHO: LVEDd. 7.66 cm, Restrictive pattern , Severe mitral valve regurgitation     CECILIA (obstructive sleep apnea) 2017     Paroxysmal atrial fibrillation (H) 2017     Paroxysmal VT (H) 2017     Uncomplicated asthma      Vitamin B12 deficiency (non anemic) 2017       FAMILY HISTORY:  Family History   Problem Relation Age of Onset     Cerebrovascular Disease Mother      Diabetes Mother      Hypertension Mother      Coronary Artery Disease Father      Diabetes Type 2  Father        SOCIAL HISTORY:  Social History     Socioeconomic History     Marital status:      Spouse name: Not on file     Number of children: Not on file     Years of education: Not on file     Highest education level: Not on file   Occupational History     Not on file   Social Needs     Financial resource strain: Not on file     Food insecurity:     Worry: Not on file     Inability: Not on file     Transportation needs:     Medical: Not on file     Non-medical: Not on file   Tobacco Use     Smoking status: Former Smoker     Last attempt to quit:      Years since quittin.1     Smokeless tobacco: Never Used   Substance and Sexual Activity     Alcohol use: No     Drug use: No     Sexual activity: Yes     Partners: Female   Lifestyle     Physical activity:     Days per week: Not on file     Minutes per session: Not on file     Stress: Not on file   Relationships     Social connections:     Talks on phone: Not on file     Gets together: Not on file      Attends Yarsani service: Not on file     Active member of club or organization: Not on file     Attends meetings of clubs or organizations: Not on file     Relationship status: Not on file     Intimate partner violence:     Fear of current or ex partner: Not on file     Emotionally abused: Not on file     Physically abused: Not on file     Forced sexual activity: Not on file   Other Topics Concern     Parent/sibling w/ CABG, MI or angioplasty before 65F 55M? No   Social History Narrative     Not on file       CURRENT MEDICATIONS:  acetaminophen (TYLENOL) 325 MG tablet, Take 650 mg by mouth every 6 hours as needed for mild pain  albuterol (ALBUTEROL) 108 (90 BASE) MCG/ACT Inhaler, Inhale 2 puffs into the lungs every 4 hours as needed for shortness of breath / dyspnea or wheezing  allopurinol (ZYLOPRIM) 100 MG tablet, Take 1 tablet (100 mg) by mouth daily  aspirin 81 MG chewable tablet, 1 tablet (81 mg) by Oral or Feeding Tube route daily  blood glucose monitoring (ONE TOUCH ULTRA 2) meter device kit, Use to test blood sugar four times daily or as directed.  blood glucose VI test strip, Use to test blood sugar four times daily or as directed.  bumetanide (BUMEX) 1 MG tablet, Take 2 tablets (2 mg) by mouth 2 times daily  citalopram (CELEXA) 20 MG tablet, Take 20 mg by mouth daily  cyanocobalamin (VITAMIN B12) 1000 MCG/ML injection, Inject 1,000 mcg into the muscle every 30 days  fluticasone-vilanterol (BREO ELLIPTA) 200-25 MCG/INH oral inhaler, Inhale 1 puff into the lungs daily  gabapentin (NEURONTIN) 300 MG capsule, Take 1 capsule (300 mg) by mouth twice daily and 2 capsules (600 mg) by mouth at bedtime daily.  insulin lispro (HUMALOG KWIKPEN) 100 UNIT/ML (1 unit dial) pen, Inject 1-7 Units Subcutaneous 4 times daily  insulin pen needle (32G X 4 MM) 32G X 4 MM miscellaneous, Use four pen needles daily or as directed.  levalbuterol (XOPENEX) 1.25 MG/3ML neb solution, Take 3 mLs (1.25 mg) by nebulization every 6  hours as needed for shortness of breath / dyspnea or wheezing  losartan (COZAAR) 25 MG tablet, Take 1 tablet (25 mg) by mouth 2 times daily  metFORMIN (GLUCOPHAGE) 500 MG tablet, Take 1 tablet (500 mg) by mouth 2 times daily (with meals)  metoprolol succinate ER (TOPROL-XL) 25 MG 24 hr tablet, Take 1 tablet (25 mg) by mouth 2 times daily  montelukast (SINGULAIR) 10 MG tablet, Take 10 mg by mouth At Bedtime  pantoprazole (PROTONIX) 40 MG EC tablet, Take 1 tablet (40 mg) by mouth every morning  potassium chloride ER (K-TAB/KLOR-CON) 10 MEQ CR tablet, Take 2 tablets (20 mEq) by mouth daily  predniSONE (DELTASONE) 20 MG tablet, Take 1 tablet (20 mg) by mouth daily  spironolactone (ALDACTONE) 25 MG tablet, Take 25 mg by mouth 2 times daily  traMADol (ULTRAM) 50 MG tablet, Take 2 tablets (100 mg) by mouth every 6 hours as needed for moderate pain or breakthrough pain  umeclidinium (INCRUSE ELLIPTA) 62.5 MCG/INH oral inhaler, Inhale 1 puff into the lungs daily  warfarin (COUMADIN) 5 MG tablet, Take 5 - 7.5mg daily or as directed by coumadin clinic.  zinc sulfate (ZINCATE) 220 (50 ZN) MG capsule, Take 220 mg by mouth 2 times daily    No current facility-administered medications on file prior to visit.       ROS:   See HPI    EXAM:  There were no vitals taken for this visit.  N/A telephone visit    Labs - as reviewed in clinic with patient today:  Telephone visit, labs being faxed    Diagnostics  2/13/2020 ICD Check  Patient seen in clinic for evaluation and iterative programming of his Medtronic Bi-Ventricular ICD per MD orders. Patient has an appointment to see ROGER Yeboah today.  Normal ICD function.  2 AT/AF episodes recorded since 1/21/20.  AF burden = 100%.  Patient takes Warfarin.  No ventricular arrhythmias recorded.  Intrinsic rhythm = AF, v-sensed @  bpm.  AP = 0.2%.  BVP = 56%.  OptiVol fluid index is elevated above baseline, ongoing since 12/14/19.   Estimated battery longevity to MARVA = 18 months.   Battery voltage = 2.9V.   No short v-v intervals recorded.  Lead trends appear stable. Patient reports that he has been having periodic dizzy spells in the last few weeks, typically when he is walking.  No ventricular arrhythmias recorded.  VT monitor zone reprogrammed from 150 to 140 bpm.  AF alerts programmed off. Plan for patient to return to clinic on 4/3/20 as scheduled.  GLADYS Nash RN.       Multi lead ICD    1/20/2020 [facility]  Interpretation Summary  Technically difficult study. Patients heart rate is in the 120s-150s during  the study.  HM2 LVAD is present and is funcioning at 9400RPM     Severely (EF 10-15%) reduced left ventricular function is present. Severe left  ventricular dilation is present. LVIDd is 6.6cm LVAD cannula in the apex is  not well visualized. Inflow and outflow velocities could not be obtained.  Aortic valve appears to open minimally with each beat. Septum is probably  midline but again difficult to visualize.  Global right ventricular function is moderately to severely reduced.  IVC diameter >2.1 cm collapsing <50% with sniff suggests a high RA pressure  estimated at 15 mmHg or greater. No pericardial effusion is present.     Compared to prior study on 1/16/20, LVIDd appears slightly larger. Patient is  severely tachycardic now. IVC appears more dilated.    7/24/2019 C   Time  Systolic  Diastolic  Mean  A Wave  V Wave  EDP  Max dp/dt  HR    RA Pressures  10:04 AM    10 mmHg     64234 mmHg     29452 mmHg       55 bpm       RV Pressures   9:42 AM        384 mmHg/sec         10:01 AM  25 mmHg         10 mmHg      39 bpm       PA Pressures  10:01 AM  25 mmHg     16 mmHg     19 mmHg         80 bpm       PCW Pressures  10:02 AM    11 mmHg     34555 mmHg     79801 mmHg       68 bpm          Time  TDCO  TDCI  Kee C.O.  Kee C.I.  Kee HR    Cardiac Output Results   9:42 AM  4.63 L/min     2.02 L/min/m2     4.62 L/min     2.01 L/min/m2             _____________________________________________________________________________    Assessment and Plan:   Jim Willingham is a 64 year old male with a past medical history of NICM (EF 20%) s/p HM II LVAD placement (6/19/17) at [facility] ([facility]) Other relavant history includes chronic kidney disease stage III, diabetes mellitus type 2, RV failure, CECILIA, obesity, hypertension, COPD, asthma. He is here for heart failure and LVAD follow up via telephone given ongoing COVID 19 outbreak.    We had originally planned doing today as an in person visit.  However patient was quite concerned about coming into clinic in the setting of COVID 19 outbreak.  We have changed with home visit.  He understands that there are risks of not been able to have vital signs or physical exam.    When I saw him a month or 2 ago he was having severe bouts of dizziness and all over body weakness.  This is had quite a drastic improvement with a decrease in his metoprolol and a decrease in his losartan.  He was formally having the symptoms 75% of the time when he changed position, now down to 25% of the time and does not seem so severe.  No falls.  We had extensive conversation about the risks and benefits of decreasing the metoprolol or the losartan further without being able to see him in clinic or to get a good blood pressure reading.  We discussed that we know that being hypertensive with his valve will put him at a higher risk for stroke.  Given how drastically his symptoms have improved we will hold off on any further medication changes until we can see him in person.  Reassuring that he is feeling so much better.  No headaches or vision changes to suggest hypertension.    He is now having persistent A. fib.  He saw Dr. Gonzales.  Not convinced that this is the cause of his symptoms but we will plan on a cardioversion.  He will get weekly INRs until this can be achieved.  I agree that his symptoms may not be being caused by the atrial fibrillation,  however going back into sinus rhythm may give us more flexibility with his metoprolol, and decreasing that did significantly improve his symptoms.      #  Chronic systolic heart failure secondary to NICM  Stage D  NYHA Class IIIB  ACEi/ARB: Continue Losartan 12.5 mg BID, recently decreased by PCP  BB: Continue metoprolol 25 mg BID given bradycardia on standing  Aldosterone antagonist: Spironolactone 25 mg daily   SCD prophylaxis: ICD  Fluid status:  Euvolemic by history- continue bumex 2 mg BID (has been diuresing well on this)     #  S/P LVAD implant as [facility] due to obesity. Goal is to loose weight to be eligible for transplant.  Anticoagulation: Warfarin with INR goal of 2-3.  Antiplatelet: Aspirin 81 mg daily  MAP: Goal 65-85 no reading today given telephone visit  LDH: Stable on most recent check  VAD Interrogation today:  NA a telephone visit     #  Morbid obesity: BMI >40.  Recommend weight loss with a goal weight of 255.  Weight loss clinic referral has been placed previously.    # Dizziness  Dizziness since being diagnosed with the flu.  Has improved very significantly with decreases in his metoprolol and losartan.  Very orthostatic by history but when he was in clinic a month ago his orthostatic vital signs were negative.  Of interest he did have bradycardia and dizziness on standing.  He also is newly in persistent A. fib, previously was paroxysmal.  Unclear how much impact this is having on his symptoms.  -No medication changes as described above.  -Potentially could decrease metoprolol further after cardioversion  -If symptoms are still ongoing after medication changes may benefit from another echo or right heart cath, he may need speech change.  Given how much he is improved will defer this for now given the risk of exposing him to COVID 19  - Discussed fall precautions    # A. Fib  100% burden on ICD check.  -Planning for cardioversion with Dr. Gonzales in the next few weeks if the COVID 19 situation allows  for this  - A/C as above     # CECILIA: Continue CPAP.       # Chronic kidney disease: Creatinine at baseline today.  Baseline creatinine in the past year 1.2-1.4.       MICHEL Yeboah REBECCA JANE

## 2020-03-19 NOTE — PROGRESS NOTES
"Today, *** is being evaluated via a billable telephone visit.      The patient has been notified of following:     \"This telephone visit will be conducted via a call between you and your physician/provider. We have found that certain health care needs can be provided without the need for a physical exam.  This service lets us provide the care you need with a short phone conversation.  If a prescription is necessary we can send it directly to your pharmacy.  If lab work is needed we can place an order for that and you can then stop by our lab to have the test done at a later time.    If during the course of the call the physician/provider feels a telephone visit is not appropriate, you will not be charged for this service.\"     Phone call contact time  Call Started at ***  Call Ended at ***      HPI:   Jim Willingham is a 64 year old male with a past medical history of NICM (EF 20%) s/p HM II LVAD placement (6/19/17) at  (obesity) Other relavant history includes a. Fib, chronic kidney disease stage III, diabetes mellitus type 2, RV failure, CECILIA, obesity, hypertension, COPD, asthma. He is here for heart failure and LVAD follow up.     He was admitted from 1/20/2020 to 1/24/2020 for Influenza with COPD exacerbation. He had also been admited from 1/15-1/16 for similar symptoms and required bipap at that time. He was somewhat hypovolmic on dischrge. He was discharged on a steroid taper, he is now on prednisone 20 mg daily which was his chronic dose pre-hospitalization. His losartan was decreased. He was discharged at 250 lbs, was felt to be slightly hypovolemic at that time.     Last visit: 2/13/2020 with Didi Butler PA-C  He was having a lot of dizziness. This had been an ongoing issue since he left the hospital. On the day he left the hospital, his weight was around 244. He checked it a week later and he was up to 254. His bumex was then increased back to 2 mg BID and he has been loosing weight quite " consistently- down about 4-5 lbs and feeling much better. At this appointment he was found to be in 100% a fib, had had much lower burden in the past.    This visit  [] weight graph  [] noteable vitals  [] Cr trend  [] LDH  [] HGB  [] INR  [] other noteable labs    Symptoms  [] SOB  [] Activity level that = RESENDIZ  [] orthopnea  [] PND  [] edema  [] abdominal swelling  [] dizziness  [] palpitations  [] chest pain  [] early satiety  [] appetite     [] blood in the urine  [] blood in the stool    [] driveline pain or drainage    [] neuro symptoms    Home monitoring  Current diuretics ______  Current potassium ____  [] take blood pressures?  [] takes weights?  []weight gain/loss      Cardiac Medications  ASA 81 mg daily  Coumadin  Bumex 2 mg BID  KCl 20 meq daily  Losartan 25 mg BID  Metoprolol 50/25  Aldactone 25 mg Ag    PAST MEDICAL HISTORY:  Past Medical History:   Diagnosis Date     Benign essential hypertension 5/11/2017     Cardiomyopathy, unspecified (H) 5/8/2017     CKD (chronic kidney disease) stage 3, GFR 30-59 ml/min (H) 5/11/2017     Depression 5/11/2017     Diabetes mellitus (H) 5/11/2017     H/O gastric bypass 5/11/2017     ICD (implantable cardioverter-defibrillator), biventricular, in situ 5/11/2017     LVAD (left ventricular assist device) present (H)      NICM (nonischemic cardiomyopathy) (H)/ EF 20% 5/11/2017    ECHO: LVEDd. 7.66 cm, Restrictive pattern , Severe mitral valve regurgitation     CECILIA (obstructive sleep apnea) 5/11/2017     Paroxysmal atrial fibrillation (H) 5/11/2017     Paroxysmal VT (H) 5/11/2017     Uncomplicated asthma      Vitamin B12 deficiency (non anemic) 5/11/2017       FAMILY HISTORY:  Family History   Problem Relation Age of Onset     Cerebrovascular Disease Mother      Diabetes Mother      Hypertension Mother      Coronary Artery Disease Father      Diabetes Type 2  Father        SOCIAL HISTORY:  Social History     Socioeconomic History     Marital status:      Spouse  name: Not on file     Number of children: Not on file     Years of education: Not on file     Highest education level: Not on file   Occupational History     Not on file   Social Needs     Financial resource strain: Not on file     Food insecurity:     Worry: Not on file     Inability: Not on file     Transportation needs:     Medical: Not on file     Non-medical: Not on file   Tobacco Use     Smoking status: Former Smoker     Last attempt to quit:      Years since quittin.1     Smokeless tobacco: Never Used   Substance and Sexual Activity     Alcohol use: No     Drug use: No     Sexual activity: Yes     Partners: Female   Lifestyle     Physical activity:     Days per week: Not on file     Minutes per session: Not on file     Stress: Not on file   Relationships     Social connections:     Talks on phone: Not on file     Gets together: Not on file     Attends Denominational service: Not on file     Active member of club or organization: Not on file     Attends meetings of clubs or organizations: Not on file     Relationship status: Not on file     Intimate partner violence:     Fear of current or ex partner: Not on file     Emotionally abused: Not on file     Physically abused: Not on file     Forced sexual activity: Not on file   Other Topics Concern     Parent/sibling w/ CABG, MI or angioplasty before 65F 55M? No   Social History Narrative     Not on file       CURRENT MEDICATIONS:  acetaminophen (TYLENOL) 325 MG tablet, Take 650 mg by mouth every 6 hours as needed for mild pain  albuterol (ALBUTEROL) 108 (90 BASE) MCG/ACT Inhaler, Inhale 2 puffs into the lungs every 4 hours as needed for shortness of breath / dyspnea or wheezing  allopurinol (ZYLOPRIM) 100 MG tablet, Take 1 tablet (100 mg) by mouth daily  aspirin 81 MG chewable tablet, 1 tablet (81 mg) by Oral or Feeding Tube route daily  blood glucose monitoring (ONE TOUCH ULTRA 2) meter device kit, Use to test blood sugar four times daily or as  directed.  blood glucose VI test strip, Use to test blood sugar four times daily or as directed.  bumetanide (BUMEX) 1 MG tablet, Take 2 tablets (2 mg) by mouth 2 times daily  citalopram (CELEXA) 20 MG tablet, Take 20 mg by mouth daily  cyanocobalamin (VITAMIN B12) 1000 MCG/ML injection, Inject 1,000 mcg into the muscle every 30 days  fluticasone-vilanterol (BREO ELLIPTA) 200-25 MCG/INH oral inhaler, Inhale 1 puff into the lungs daily  gabapentin (NEURONTIN) 300 MG capsule, Take 1 capsule (300 mg) by mouth twice daily and 2 capsules (600 mg) by mouth at bedtime daily.  insulin lispro (HUMALOG KWIKPEN) 100 UNIT/ML (1 unit dial) pen, Inject 1-7 Units Subcutaneous 4 times daily  insulin pen needle (32G X 4 MM) 32G X 4 MM miscellaneous, Use four pen needles daily or as directed.  levalbuterol (XOPENEX) 1.25 MG/3ML neb solution, Take 3 mLs (1.25 mg) by nebulization every 6 hours as needed for shortness of breath / dyspnea or wheezing  losartan (COZAAR) 25 MG tablet, Take 1 tablet (25 mg) by mouth 2 times daily  metFORMIN (GLUCOPHAGE) 500 MG tablet, Take 1 tablet (500 mg) by mouth 2 times daily (with meals)  metoprolol succinate ER (TOPROL-XL) 25 MG 24 hr tablet, Take 1 tablet (25 mg) by mouth 2 times daily  montelukast (SINGULAIR) 10 MG tablet, Take 10 mg by mouth At Bedtime  pantoprazole (PROTONIX) 40 MG EC tablet, Take 1 tablet (40 mg) by mouth every morning  potassium chloride ER (K-TAB/KLOR-CON) 10 MEQ CR tablet, Take 2 tablets (20 mEq) by mouth daily  predniSONE (DELTASONE) 20 MG tablet, Take 1 tablet (20 mg) by mouth daily  spironolactone (ALDACTONE) 25 MG tablet, Take 25 mg by mouth 2 times daily  traMADol (ULTRAM) 50 MG tablet, Take 2 tablets (100 mg) by mouth every 6 hours as needed for moderate pain or breakthrough pain  umeclidinium (INCRUSE ELLIPTA) 62.5 MCG/INH oral inhaler, Inhale 1 puff into the lungs daily  warfarin (COUMADIN) 5 MG tablet, Take 5 - 7.5mg daily or as directed by coumadin clinic.  zinc  sulfate (ZINCATE) 220 (50 ZN) MG capsule, Take 220 mg by mouth 2 times daily    No current facility-administered medications on file prior to visit.       ROS:   CONSTITUTIONAL: Denies fever, chills.  HEENT: Denies headache, vision changes, and changes in speech.   CV: Refer to HPI.   PULMONARY:Refer to HPI.   GI:Denies nausea, vomiting, diarrhea, and abdominal pain. Bowel movements are regular.   :Denies urinary alterations, dysuria, urinary frequency, hematuria, and abnormal drainage.   EXT:See HPI.  SKIN:Denies abnormal rashes or lesions.   MUSCULOSKELETAL:Denies upper or lower extremity weakness and pain.   NEUROLOGIC +dizziness. Denies seizures, or upper or lower extremity paresthesia.     EXAM:  There were no vitals taken for this visit.  N/A telephone visit    Labs - as reviewed in clinic with patient today:  CBC RESULTS:  Lab Results   Component Value Date    WBC 7.5 02/13/2020    RBC 4.01 (L) 02/13/2020    HGB 11.2 (L) 02/13/2020    HCT 37.3 (L) 02/13/2020    MCV 93 02/13/2020    MCH 27.9 02/13/2020    MCHC 30.0 (L) 02/13/2020    RDW 15.3 (H) 02/13/2020     02/13/2020       CMP RESULTS:  Lab Results   Component Value Date     02/17/2020    POTASSIUM 4.1 02/17/2020    CHLORIDE 101 02/17/2020    CO2 29 02/17/2020    ANIONGAP 6 02/17/2020     (H) 02/17/2020    BUN 38 (H) 02/17/2020    CR 1.23 02/17/2020    GFRESTIMATED 62 02/17/2020    GFRESTBLACK 72 02/17/2020    QAMAR 8.7 02/17/2020    BILITOTAL 0.5 02/17/2020    ALBUMIN 3.5 02/17/2020    ALKPHOS 116 02/17/2020    ALT 60 02/17/2020    AST 22 02/17/2020        INR RESULTS:  Lab Results   Component Value Date    INR 2.9 (A) 03/16/2020       Lab Results   Component Value Date    MAG 2.3 01/24/2020     Lab Results   Component Value Date    NTBNPI 4,216 (H) 06/15/2017     Lab Results   Component Value Date    NTBNP 866 (H) 07/31/2019     Diagnostics  2/13/2020 ICD Check  Patient seen in clinic for evaluation and iterative programming of his  Medtronic Bi-Ventricular ICD per MD orders. Patient has an appointment to see ROGER Yeboah today.  Normal ICD function.  2 AT/AF episodes recorded since 1/21/20.  AF burden = 100%.  Patient takes Warfarin.  No ventricular arrhythmias recorded.  Intrinsic rhythm = AF, v-sensed @  bpm.  AP = 0.2%.  BVP = 56%.  OptiVol fluid index is elevated above baseline, ongoing since 12/14/19.   Estimated battery longevity to MARVA = 18 months.  Battery voltage = 2.9V.   No short v-v intervals recorded.  Lead trends appear stable. Patient reports that he has been having periodic dizzy spells in the last few weeks, typically when he is walking.  No ventricular arrhythmias recorded.  VT monitor zone reprogrammed from 150 to 140 bpm.  AF alerts programmed off. Plan for patient to return to clinic on 4/3/20 as scheduled.  GLADYS Nash RN.       Multi lead ICD    1/20/2020 \[facility] [place] [facility]\""  Interpretation Summary  Technically difficult study. Patients heart rate is in the 120s-150s during  the study.  HM2 LVAD is present and is funcioning at 9400RPM     Severely (EF 10-15%) reduced left ventricular function is present. Severe left  ventricular dilation is present. LVIDd is 6.6cm LVAD cannula in the apex is  not well visualized. Inflow and outflow velocities could not be obtained.  Aortic valve appears to open minimally with each beat. Septum is probably  midline but again difficult to visualize.  Global right ventricular function is moderately to severely reduced.  IVC diameter >2.1 cm collapsing <50% with sniff suggests a high RA pressure  estimated at 15 mmHg or greater. No pericardial effusion is present.     Compared to prior study on 1/16/20, LVIDd appears slightly larger. Patient is  severely tachycardic now. IVC appears more dilated.    7/24/2019 [facility]   Time  Systolic  Diastolic  Mean  A Wave  V Wave  EDP  Max dp/dt  HR    RA Pressures  10:04 AM    10 mmHg     33337 mmHg     16599 mmHg       55 bpm       RV Pressures   9:42 AM         384 mmHg/sec         10:01 AM  25 mmHg         10 mmHg      39 bpm       PA Pressures  10:01 AM  25 mmHg     16 mmHg     19 mmHg         80 bpm       PCW Pressures  10:02 AM    11 mmHg     52740 mmHg     41380 mmHg       68 bpm          Time  TDCO  TDCI  Kee C.O.  Kee C.I.  Kee HR    Cardiac Output Results   9:42 AM  4.63 L/min     2.02 L/min/m2     4.62 L/min     2.01 L/min/m2            _____________________________________________________________________________    Assessment and Plan:   Jim Willingham is a 64 year old male with a past medical history of NICM (EF 20%) s/p HM II LVAD placement (6/19/17) at  (obesity) Other relavant history includes chronic kidney disease stage III, diabetes mellitus type 2, RV failure, CECILIA, obesity, hypertension, COPD, asthma. He is here for heart failure and LVAD follow up.     Jim has dizziness which has been ongoing since he was first diagnosed with the flu. He had thought it was getting better with diuresis, but he had more significant dizziness coming into clinic this morning and had to sit and take a break. He never had this at rest. Always with position changes which sounds very orthostatic by history, but his is frankly volume up on exam. His symptoms do not seem to be blood pressure related as he is near goal today. Given his hypervolemic exam and negative orthostatic vital signs, I am wondering if there is a post-viral inner ear component to his symptoms. His neuro exam is normal including his cerebellar function. He did have a fall with headstrike earlier this week, so we will check a head CT to be cautious. We also discussed fall precautions.     Although his orthostatic vital signs were negative, he did have decreased heart rate on standing suggesting some chronotropic incompetence. I'm surprised that he would be so symptomatic given his LVAD, but will will decrease his metoprolol to see if we get any benefit from that.    We will attempt continued diuresis,  continue current Bumex. Cr is slightly up which I suspect is cardiorenal.     #  Chronic systolic heart failure secondary to NICM  Stage D  NYHA Class IIIB  ACEi/ARB: Losartan 25 mg BID  BB: Decrease to metoprolol 25 mg BID given bradycardia on standing  Aldosterone antagonist: Spironolactone 25 mg daily   SCD prophylaxis: ICD  Fluid status: hypervolemic- continue bumex 2 mg BID (has been diuresing well on this)     #  S/P LVAD implant as DT due to obesity. Goal is to loose weight to be eligible for transplant.  Anticoagulation: Warfarin with INR goal of 2-3.  Antiplatelet: Aspirin 81 mg daily  MAP: Goal 65-85, 88 today  LDH: Stable at 349  VAD Interrogation today: VAD interrogation reviewed with VAD coordinator. Agree with findings. Many PI events, rare speed drops. No power spikes  or other findings suspicious of pump malfunction noted.      #  Morbid obesity: BMI >40.  Recommend weight loss with a goal weight of 255.  Weight loss clinic referral has been placed previously.    # Dizziness  ## Fall with headstrike  Dizziness since being diagnosed with the flu. Orthostatic vital signs today were negative, but he had bradycardia and dizziness on standing.  - Decrease metoprolol as above  - Head CT  - Discussed fall precautions    # A. Fib  100% burden on ICD check.  - Decreasing BB for bradycardia. Will ask primary cardiologist about potential EP referal as he also has tachycardia on his ICD check  - A/C as above     # CECILIA: Continue CPAP.       # Chronic kidney disease: BUN and creatinine more elevated today.   Baseline creatinine in the past year 1.2-1.4. I suspect this is cardiorenal. Continue diuresis.    50 minutes spent face-to-face with patient, >50% in counseling and/or coordination of care as described above      MICHEL Yeboah REBECCA JANE

## 2020-03-27 ENCOUNTER — CARE COORDINATION (OUTPATIENT)
Dept: CARDIOLOGY | Facility: CLINIC | Age: 65
End: 2020-03-27

## 2020-03-27 DIAGNOSIS — I50.22 CHRONIC SYSTOLIC CONGESTIVE HEART FAILURE (H): Primary | ICD-10-CM

## 2020-03-27 NOTE — PROGRESS NOTES
Called pt to day to check in. Pt reports a decrease in the frequency of his dizziness/weakness. When those episodes were at their peak, pt was only tracking approximately 800 steps per day. Currently he is tracking 4940-7921 steps per day and he feels less limited. Discussed plan for follow-up. Pt will present to clinic next week on 4/1 for labs and bp check. He will then have a virtual visit with Dr. Montgomery on 4/3.

## 2020-03-30 DIAGNOSIS — I50.22 CHRONIC SYSTOLIC CONGESTIVE HEART FAILURE (H): Primary | ICD-10-CM

## 2020-04-01 ENCOUNTER — ANCILLARY PROCEDURE (OUTPATIENT)
Dept: CARDIOLOGY | Facility: CLINIC | Age: 65
End: 2020-04-01
Attending: INTERNAL MEDICINE
Payer: COMMERCIAL

## 2020-04-01 ENCOUNTER — ANTICOAGULATION THERAPY VISIT (OUTPATIENT)
Dept: ANTICOAGULATION | Facility: CLINIC | Age: 65
End: 2020-04-01

## 2020-04-01 VITALS — HEART RATE: 85 BPM | SYSTOLIC BLOOD PRESSURE: 78 MMHG | OXYGEN SATURATION: 98 %

## 2020-04-01 DIAGNOSIS — I50.22 CHRONIC SYSTOLIC CONGESTIVE HEART FAILURE (H): ICD-10-CM

## 2020-04-01 DIAGNOSIS — Z79.01 LONG TERM CURRENT USE OF ANTICOAGULANT THERAPY: ICD-10-CM

## 2020-04-01 DIAGNOSIS — I42.9 CARDIOMYOPATHY (H): ICD-10-CM

## 2020-04-01 DIAGNOSIS — Z95.811 LVAD (LEFT VENTRICULAR ASSIST DEVICE) PRESENT (H): ICD-10-CM

## 2020-04-01 DIAGNOSIS — Z95.811 LVAD (LEFT VENTRICULAR ASSIST DEVICE) PRESENT (H): Primary | ICD-10-CM

## 2020-04-01 LAB
ALBUMIN SERPL-MCNC: 3.5 G/DL (ref 3.4–5)
ALP SERPL-CCNC: 102 U/L (ref 40–150)
ALT SERPL W P-5'-P-CCNC: 52 U/L (ref 0–70)
ANION GAP SERPL CALCULATED.3IONS-SCNC: 4 MMOL/L (ref 3–14)
AST SERPL W P-5'-P-CCNC: 30 U/L (ref 0–45)
BILIRUB SERPL-MCNC: 0.7 MG/DL (ref 0.2–1.3)
BUN SERPL-MCNC: 28 MG/DL (ref 7–30)
CALCIUM SERPL-MCNC: 8 MG/DL (ref 8.5–10.1)
CHLORIDE SERPL-SCNC: 101 MMOL/L (ref 94–109)
CO2 SERPL-SCNC: 31 MMOL/L (ref 20–32)
CREAT SERPL-MCNC: 1.17 MG/DL (ref 0.66–1.25)
D DIMER PPP FEU-MCNC: 0.4 UG/ML FEU (ref 0–0.5)
ERYTHROCYTE [DISTWIDTH] IN BLOOD BY AUTOMATED COUNT: 16 % (ref 10–15)
GFR SERPL CREATININE-BSD FRML MDRD: 66 ML/MIN/{1.73_M2}
GLUCOSE SERPL-MCNC: 179 MG/DL (ref 70–99)
HCT VFR BLD AUTO: 38.2 % (ref 40–53)
HGB BLD-MCNC: 11.3 G/DL (ref 13.3–17.7)
INR PPP: 2.84 (ref 0.86–1.14)
LDH SERPL L TO P-CCNC: 320 U/L (ref 85–227)
MCH RBC QN AUTO: 25.2 PG (ref 26.5–33)
MCHC RBC AUTO-ENTMCNC: 29.6 G/DL (ref 31.5–36.5)
MCV RBC AUTO: 85 FL (ref 78–100)
PLATELET # BLD AUTO: 248 10E9/L (ref 150–450)
POTASSIUM SERPL-SCNC: 3.5 MMOL/L (ref 3.4–5.3)
PROT SERPL-MCNC: 6.1 G/DL (ref 6.8–8.8)
RBC # BLD AUTO: 4.49 10E12/L (ref 4.4–5.9)
SODIUM SERPL-SCNC: 136 MMOL/L (ref 133–144)
WBC # BLD AUTO: 7.8 10E9/L (ref 4–11)

## 2020-04-01 PROCEDURE — 85379 FIBRIN DEGRADATION QUANT: CPT | Performed by: INTERNAL MEDICINE

## 2020-04-01 PROCEDURE — 85610 PROTHROMBIN TIME: CPT | Performed by: INTERNAL MEDICINE

## 2020-04-01 PROCEDURE — 36415 COLL VENOUS BLD VENIPUNCTURE: CPT | Performed by: INTERNAL MEDICINE

## 2020-04-01 PROCEDURE — 83615 LACTATE (LD) (LDH) ENZYME: CPT | Performed by: INTERNAL MEDICINE

## 2020-04-01 PROCEDURE — 80053 COMPREHEN METABOLIC PANEL: CPT | Performed by: INTERNAL MEDICINE

## 2020-04-01 PROCEDURE — 99207 CARDIAC DEVICE CHECK - REMOTE: CPT | Performed by: INTERNAL MEDICINE

## 2020-04-01 PROCEDURE — 85027 COMPLETE CBC AUTOMATED: CPT | Performed by: INTERNAL MEDICINE

## 2020-04-01 PROCEDURE — 93296 REM INTERROG EVL PM/IDS: CPT | Mod: ZF

## 2020-04-01 NOTE — LETTER
4/1/2020      RE: Jim Willingham  [address] [facility] [place] [address]       .     CVC CMA

## 2020-04-01 NOTE — PROGRESS NOTES
ANTICOAGULATION FOLLOW-UP CLINIC VISIT    Patient Name:  Jim Willingham  Date:  2020  Contact Type:  Telephone    SUBJECTIVE:  Patient Findings     Comments:   Spoke with Jim.  He reports no changes in health, diet, medications.        Clinical Outcomes     Comments:   Spoke with Jim.  He reports no changes in health, diet, medications.           OBJECTIVE    INR   Date Value Ref Range Status   2020 2.84 (H) 0.86 - 1.14 Final       ASSESSMENT / PLAN  INR assessment THER    Recheck INR In: 2 WEEKS    INR Location Clinic      Anticoagulation Summary  As of 2020    INR goal:   2.0-3.0   TTR:   71.2 % (11.7 mo)   INR used for dosin.84 (2020)   Warfarin maintenance plan:   10 mg (5 mg x 2) every Fri; 7.5 mg (5 mg x 1.5) all other days   Full warfarin instructions:   10 mg every Fri; 7.5 mg all other days   Weekly warfarin total:   55 mg   No change documented:   Kirsty Bermudez RN   Plan last modified:   Delisa Hillman RN (3/17/2020)   Next INR check:   4/15/2020   Priority:   Critical   Target end date:       Indications    LVAD (left ventricular assist device) present (H) [Z95.811]  Long-term (current) use of anticoagulants [Z79.01] [Z79.01]             Anticoagulation Episode Summary     INR check location:       Preferred lab:       Send INR reminders to:   [facility]    Comments:   HIPPA Forms mailed 17  Labs drawn either at [facility] or [facility], See 3/5/20 ADDENDUM, pt. weekly INR >2.0 for Cardioversion  LVAD placed 17   II  ASA 81 mg daily      Anticoagulation Care Providers     Provider Role Specialty Phone number    Delisa Montgomery MD Responsible Cardiology [phone]            See the Encounter Report to view Anticoagulation Flowsheet and Dosing Calendar (Go to Encounters tab in chart review, and/ find the Anticoagulation Therapy Visit)    Spoke with Jim.     Patient had LVAD placed on:   17  Type of LVAD: HM 2  Patient's  current Aspirin dose: 81 mg daily  LVAD Protocol followed: Yes      Kirsty Bermudez RN

## 2020-04-01 NOTE — LETTER
4/1/2020      RE: Jim Willingham  [address] [facility] [address]       Dear Colleague,    Thank you for the opportunity to participate in the care of your patient, Jim Willingham, at the [facility]. Please see a copy of my visit note below.    .    Please do not hesitate to contact me if you have any questions/concerns.     Sincerely,      CVC CMA

## 2020-04-01 NOTE — NURSING NOTE
Chief Complaint   Patient presents with     Follow Up     CMA visit-- In per BP check, paget VAD Coord on call for abnormal results     Vitals were taken.    April López CMA    9:41 AM

## 2020-04-03 ENCOUNTER — VIRTUAL VISIT (OUTPATIENT)
Dept: CARDIOLOGY | Facility: CLINIC | Age: 65
End: 2020-04-03
Attending: INTERNAL MEDICINE
Payer: COMMERCIAL

## 2020-04-03 DIAGNOSIS — Z53.9 ERRONEOUS ENCOUNTER--DISREGARD: Primary | ICD-10-CM

## 2020-04-03 NOTE — PROGRESS NOTES
No care provided- patient could not be contacted     Delisa Montgomery MD      This encounter was opened in error. Please disregard.

## 2020-04-04 LAB
MDC_IDC_EPISODE_DTM: NORMAL
MDC_IDC_EPISODE_DURATION: 10 S
MDC_IDC_EPISODE_DURATION: 111 S
MDC_IDC_EPISODE_DURATION: 12 S
MDC_IDC_EPISODE_DURATION: 128 S
MDC_IDC_EPISODE_DURATION: 13 S
MDC_IDC_EPISODE_DURATION: 136 S
MDC_IDC_EPISODE_DURATION: 145 S
MDC_IDC_EPISODE_DURATION: 15 S
MDC_IDC_EPISODE_DURATION: 17 S
MDC_IDC_EPISODE_DURATION: 18 S
MDC_IDC_EPISODE_DURATION: 186 S
MDC_IDC_EPISODE_DURATION: 20 S
MDC_IDC_EPISODE_DURATION: 209 S
MDC_IDC_EPISODE_DURATION: 24 S
MDC_IDC_EPISODE_DURATION: 2706 S
MDC_IDC_EPISODE_DURATION: 29 S
MDC_IDC_EPISODE_DURATION: 29 S
MDC_IDC_EPISODE_DURATION: 3 S
MDC_IDC_EPISODE_DURATION: 32 S
MDC_IDC_EPISODE_DURATION: 35 S
MDC_IDC_EPISODE_DURATION: 37 S
MDC_IDC_EPISODE_DURATION: 38 S
MDC_IDC_EPISODE_DURATION: 46 S
MDC_IDC_EPISODE_DURATION: 47 S
MDC_IDC_EPISODE_DURATION: 49 S
MDC_IDC_EPISODE_DURATION: 5 S
MDC_IDC_EPISODE_DURATION: 52 S
MDC_IDC_EPISODE_DURATION: 68 S
MDC_IDC_EPISODE_DURATION: 70 S
MDC_IDC_EPISODE_DURATION: 77 S
MDC_IDC_EPISODE_DURATION: 8 S
MDC_IDC_EPISODE_DURATION: 9 S
MDC_IDC_EPISODE_DURATION: 97 S
MDC_IDC_EPISODE_DURATION: NORMAL S
MDC_IDC_EPISODE_ID: 321
MDC_IDC_EPISODE_ID: 322
MDC_IDC_EPISODE_ID: 323
MDC_IDC_EPISODE_ID: 324
MDC_IDC_EPISODE_ID: 325
MDC_IDC_EPISODE_ID: 326
MDC_IDC_EPISODE_ID: 327
MDC_IDC_EPISODE_ID: 328
MDC_IDC_EPISODE_ID: 329
MDC_IDC_EPISODE_ID: 330
MDC_IDC_EPISODE_ID: 331
MDC_IDC_EPISODE_ID: 332
MDC_IDC_EPISODE_ID: 333
MDC_IDC_EPISODE_ID: 334
MDC_IDC_EPISODE_ID: 335
MDC_IDC_EPISODE_ID: 336
MDC_IDC_EPISODE_ID: 337
MDC_IDC_EPISODE_ID: 338
MDC_IDC_EPISODE_ID: 339
MDC_IDC_EPISODE_ID: 340
MDC_IDC_EPISODE_ID: 341
MDC_IDC_EPISODE_ID: 342
MDC_IDC_EPISODE_ID: 343
MDC_IDC_EPISODE_ID: 344
MDC_IDC_EPISODE_ID: 345
MDC_IDC_EPISODE_ID: 346
MDC_IDC_EPISODE_ID: 347
MDC_IDC_EPISODE_ID: 348
MDC_IDC_EPISODE_ID: 349
MDC_IDC_EPISODE_ID: 350
MDC_IDC_EPISODE_ID: 351
MDC_IDC_EPISODE_ID: 352
MDC_IDC_EPISODE_ID: 353
MDC_IDC_EPISODE_ID: 354
MDC_IDC_EPISODE_ID: 355
MDC_IDC_EPISODE_ID: 356
MDC_IDC_EPISODE_ID: 357
MDC_IDC_EPISODE_ID: 358
MDC_IDC_EPISODE_ID: NORMAL
MDC_IDC_EPISODE_TYPE: NORMAL
MDC_IDC_LEAD_IMPLANT_DT: NORMAL
MDC_IDC_LEAD_LOCATION: NORMAL
MDC_IDC_LEAD_LOCATION_DETAIL_1: NORMAL
MDC_IDC_LEAD_MFG: NORMAL
MDC_IDC_LEAD_MODEL: NORMAL
MDC_IDC_LEAD_POLARITY_TYPE: NORMAL
MDC_IDC_LEAD_SERIAL: NORMAL
MDC_IDC_MSMT_BATTERY_DTM: NORMAL
MDC_IDC_MSMT_BATTERY_REMAINING_LONGEVITY: 12 MO
MDC_IDC_MSMT_BATTERY_RRT_TRIGGER: 2.73
MDC_IDC_MSMT_BATTERY_STATUS: NORMAL
MDC_IDC_MSMT_BATTERY_VOLTAGE: 2.89 V
MDC_IDC_MSMT_CAP_CHARGE_DTM: NORMAL
MDC_IDC_MSMT_CAP_CHARGE_ENERGY: 18 J
MDC_IDC_MSMT_CAP_CHARGE_TIME: 4.28
MDC_IDC_MSMT_CAP_CHARGE_TYPE: NORMAL
MDC_IDC_MSMT_LEADCHNL_LV_IMPEDANCE_VALUE: 399 OHM
MDC_IDC_MSMT_LEADCHNL_LV_IMPEDANCE_VALUE: 4047 OHM
MDC_IDC_MSMT_LEADCHNL_LV_IMPEDANCE_VALUE: 4047 OHM
MDC_IDC_MSMT_LEADCHNL_LV_PACING_THRESHOLD_AMPLITUDE: 0.62 V
MDC_IDC_MSMT_LEADCHNL_LV_PACING_THRESHOLD_PULSEWIDTH: 0.4 MS
MDC_IDC_MSMT_LEADCHNL_RA_IMPEDANCE_VALUE: 456 OHM
MDC_IDC_MSMT_LEADCHNL_RA_PACING_THRESHOLD_AMPLITUDE: 1.25 V
MDC_IDC_MSMT_LEADCHNL_RA_PACING_THRESHOLD_PULSEWIDTH: 0.4 MS
MDC_IDC_MSMT_LEADCHNL_RA_SENSING_INTR_AMPL: 2.25 MV
MDC_IDC_MSMT_LEADCHNL_RA_SENSING_INTR_AMPL: 2.25 MV
MDC_IDC_MSMT_LEADCHNL_RV_IMPEDANCE_VALUE: 247 OHM
MDC_IDC_MSMT_LEADCHNL_RV_IMPEDANCE_VALUE: 342 OHM
MDC_IDC_MSMT_LEADCHNL_RV_PACING_THRESHOLD_AMPLITUDE: 0.88 V
MDC_IDC_MSMT_LEADCHNL_RV_PACING_THRESHOLD_PULSEWIDTH: 0.4 MS
MDC_IDC_MSMT_LEADCHNL_RV_SENSING_INTR_AMPL: 4.88 MV
MDC_IDC_MSMT_LEADCHNL_RV_SENSING_INTR_AMPL: 4.88 MV
MDC_IDC_PG_IMPLANT_DTM: NORMAL
MDC_IDC_PG_MFG: NORMAL
MDC_IDC_PG_MODEL: NORMAL
MDC_IDC_PG_SERIAL: NORMAL
MDC_IDC_PG_TYPE: NORMAL
MDC_IDC_SESS_CLINIC_NAME: NORMAL
MDC_IDC_SESS_DTM: NORMAL
MDC_IDC_SESS_TYPE: NORMAL
MDC_IDC_SET_BRADY_AT_MODE_SWITCH_RATE: 150 {BEATS}/MIN
MDC_IDC_SET_BRADY_LOWRATE: 50 {BEATS}/MIN
MDC_IDC_SET_BRADY_MAX_SENSOR_RATE: 130 {BEATS}/MIN
MDC_IDC_SET_BRADY_MAX_TRACKING_RATE: 130 {BEATS}/MIN
MDC_IDC_SET_BRADY_MODE: NORMAL
MDC_IDC_SET_BRADY_PAV_DELAY_LOW: 150 MS
MDC_IDC_SET_BRADY_SAV_DELAY_LOW: 110 MS
MDC_IDC_SET_CRT_LVRV_DELAY: 0 MS
MDC_IDC_SET_CRT_PACED_CHAMBERS: NORMAL
MDC_IDC_SET_LEADCHNL_LV_PACING_AMPLITUDE: 1.75 V
MDC_IDC_SET_LEADCHNL_LV_PACING_ANODE_ELECTRODE_1: NORMAL
MDC_IDC_SET_LEADCHNL_LV_PACING_ANODE_LOCATION_1: NORMAL
MDC_IDC_SET_LEADCHNL_LV_PACING_CAPTURE_MODE: NORMAL
MDC_IDC_SET_LEADCHNL_LV_PACING_CATHODE_ELECTRODE_1: NORMAL
MDC_IDC_SET_LEADCHNL_LV_PACING_CATHODE_LOCATION_1: NORMAL
MDC_IDC_SET_LEADCHNL_LV_PACING_POLARITY: NORMAL
MDC_IDC_SET_LEADCHNL_LV_PACING_PULSEWIDTH: 0.4 MS
MDC_IDC_SET_LEADCHNL_RA_PACING_AMPLITUDE: 2.5 V
MDC_IDC_SET_LEADCHNL_RA_PACING_ANODE_ELECTRODE_1: NORMAL
MDC_IDC_SET_LEADCHNL_RA_PACING_ANODE_LOCATION_1: NORMAL
MDC_IDC_SET_LEADCHNL_RA_PACING_CAPTURE_MODE: NORMAL
MDC_IDC_SET_LEADCHNL_RA_PACING_CATHODE_ELECTRODE_1: NORMAL
MDC_IDC_SET_LEADCHNL_RA_PACING_CATHODE_LOCATION_1: NORMAL
MDC_IDC_SET_LEADCHNL_RA_PACING_POLARITY: NORMAL
MDC_IDC_SET_LEADCHNL_RA_PACING_PULSEWIDTH: 0.4 MS
MDC_IDC_SET_LEADCHNL_RA_SENSING_ANODE_ELECTRODE_1: NORMAL
MDC_IDC_SET_LEADCHNL_RA_SENSING_ANODE_LOCATION_1: NORMAL
MDC_IDC_SET_LEADCHNL_RA_SENSING_CATHODE_ELECTRODE_1: NORMAL
MDC_IDC_SET_LEADCHNL_RA_SENSING_CATHODE_LOCATION_1: NORMAL
MDC_IDC_SET_LEADCHNL_RA_SENSING_POLARITY: NORMAL
MDC_IDC_SET_LEADCHNL_RA_SENSING_SENSITIVITY: 0.45 MV
MDC_IDC_SET_LEADCHNL_RV_PACING_AMPLITUDE: 2 V
MDC_IDC_SET_LEADCHNL_RV_PACING_ANODE_ELECTRODE_1: NORMAL
MDC_IDC_SET_LEADCHNL_RV_PACING_ANODE_LOCATION_1: NORMAL
MDC_IDC_SET_LEADCHNL_RV_PACING_CAPTURE_MODE: NORMAL
MDC_IDC_SET_LEADCHNL_RV_PACING_CATHODE_ELECTRODE_1: NORMAL
MDC_IDC_SET_LEADCHNL_RV_PACING_CATHODE_LOCATION_1: NORMAL
MDC_IDC_SET_LEADCHNL_RV_PACING_POLARITY: NORMAL
MDC_IDC_SET_LEADCHNL_RV_PACING_PULSEWIDTH: 0.4 MS
MDC_IDC_SET_LEADCHNL_RV_SENSING_ANODE_ELECTRODE_1: NORMAL
MDC_IDC_SET_LEADCHNL_RV_SENSING_ANODE_LOCATION_1: NORMAL
MDC_IDC_SET_LEADCHNL_RV_SENSING_CATHODE_ELECTRODE_1: NORMAL
MDC_IDC_SET_LEADCHNL_RV_SENSING_CATHODE_LOCATION_1: NORMAL
MDC_IDC_SET_LEADCHNL_RV_SENSING_POLARITY: NORMAL
MDC_IDC_SET_LEADCHNL_RV_SENSING_SENSITIVITY: 0.3 MV
MDC_IDC_SET_ZONE_DETECTION_BEATS_DENOMINATOR: 40 {BEATS}
MDC_IDC_SET_ZONE_DETECTION_BEATS_NUMERATOR: 30 {BEATS}
MDC_IDC_SET_ZONE_DETECTION_INTERVAL: 240 MS
MDC_IDC_SET_ZONE_DETECTION_INTERVAL: 320 MS
MDC_IDC_SET_ZONE_DETECTION_INTERVAL: 360 MS
MDC_IDC_SET_ZONE_DETECTION_INTERVAL: 400 MS
MDC_IDC_SET_ZONE_DETECTION_INTERVAL: 430 MS
MDC_IDC_SET_ZONE_TYPE: NORMAL
MDC_IDC_STAT_AT_BURDEN_PERCENT: 100 %
MDC_IDC_STAT_AT_DTM_END: NORMAL
MDC_IDC_STAT_AT_DTM_START: NORMAL
MDC_IDC_STAT_BRADY_AP_VP_PERCENT: 0.62 %
MDC_IDC_STAT_BRADY_AP_VS_PERCENT: 0.1 %
MDC_IDC_STAT_BRADY_AS_VP_PERCENT: 61.34 %
MDC_IDC_STAT_BRADY_AS_VS_PERCENT: 37.93 %
MDC_IDC_STAT_BRADY_DTM_END: NORMAL
MDC_IDC_STAT_BRADY_DTM_START: NORMAL
MDC_IDC_STAT_BRADY_RA_PERCENT_PACED: 0.49 %
MDC_IDC_STAT_BRADY_RV_PERCENT_PACED: 65.08 %
MDC_IDC_STAT_CRT_DTM_END: NORMAL
MDC_IDC_STAT_CRT_DTM_START: NORMAL
MDC_IDC_STAT_CRT_LV_PERCENT_PACED: 64.99 %
MDC_IDC_STAT_CRT_PERCENT_PACED: 64.99 %
MDC_IDC_STAT_EPISODE_RECENT_COUNT: 0
MDC_IDC_STAT_EPISODE_RECENT_COUNT: 1
MDC_IDC_STAT_EPISODE_RECENT_COUNT: 11
MDC_IDC_STAT_EPISODE_RECENT_COUNT: 2
MDC_IDC_STAT_EPISODE_RECENT_COUNT_DTM_END: NORMAL
MDC_IDC_STAT_EPISODE_RECENT_COUNT_DTM_START: NORMAL
MDC_IDC_STAT_EPISODE_TOTAL_COUNT: 0
MDC_IDC_STAT_EPISODE_TOTAL_COUNT: 0
MDC_IDC_STAT_EPISODE_TOTAL_COUNT: 16
MDC_IDC_STAT_EPISODE_TOTAL_COUNT: 182
MDC_IDC_STAT_EPISODE_TOTAL_COUNT: 2
MDC_IDC_STAT_EPISODE_TOTAL_COUNT: 30
MDC_IDC_STAT_EPISODE_TOTAL_COUNT: 7
MDC_IDC_STAT_EPISODE_TOTAL_COUNT_DTM_END: NORMAL
MDC_IDC_STAT_EPISODE_TOTAL_COUNT_DTM_START: NORMAL
MDC_IDC_STAT_EPISODE_TYPE: NORMAL
MDC_IDC_STAT_TACHYTHERAPY_ATP_DELIVERED_RECENT: 1
MDC_IDC_STAT_TACHYTHERAPY_ATP_DELIVERED_TOTAL: 14
MDC_IDC_STAT_TACHYTHERAPY_RECENT_DTM_END: NORMAL
MDC_IDC_STAT_TACHYTHERAPY_RECENT_DTM_START: NORMAL
MDC_IDC_STAT_TACHYTHERAPY_SHOCKS_ABORTED_RECENT: 0
MDC_IDC_STAT_TACHYTHERAPY_SHOCKS_ABORTED_TOTAL: 2
MDC_IDC_STAT_TACHYTHERAPY_SHOCKS_DELIVERED_RECENT: 0
MDC_IDC_STAT_TACHYTHERAPY_SHOCKS_DELIVERED_TOTAL: 2
MDC_IDC_STAT_TACHYTHERAPY_TOTAL_DTM_END: NORMAL
MDC_IDC_STAT_TACHYTHERAPY_TOTAL_DTM_START: NORMAL

## 2020-04-09 ENCOUNTER — CARE COORDINATION (OUTPATIENT)
Dept: CARDIOLOGY | Facility: CLINIC | Age: 65
End: 2020-04-09

## 2020-04-09 NOTE — PROGRESS NOTES
I called Jim about getting labs today for his 4/10 Virtual Visit with Dr. Montgomery. He said he had a set of labs drawn on 4/1/2020.

## 2020-04-15 ENCOUNTER — ANTICOAGULATION THERAPY VISIT (OUTPATIENT)
Dept: ANTICOAGULATION | Facility: CLINIC | Age: 65
End: 2020-04-15

## 2020-04-15 DIAGNOSIS — Z95.811 LVAD (LEFT VENTRICULAR ASSIST DEVICE) PRESENT (H): ICD-10-CM

## 2020-04-15 DIAGNOSIS — Z79.01 LONG TERM CURRENT USE OF ANTICOAGULANT THERAPY: ICD-10-CM

## 2020-04-15 LAB — INR PPP: 2.99 (ref 0.86–1.14)

## 2020-04-15 PROCEDURE — 85610 PROTHROMBIN TIME: CPT | Performed by: INTERNAL MEDICINE

## 2020-04-15 PROCEDURE — 36415 COLL VENOUS BLD VENIPUNCTURE: CPT | Performed by: INTERNAL MEDICINE

## 2020-04-15 NOTE — PROGRESS NOTES
ANTICOAGULATION FOLLOW-UP CLINIC VISIT    Patient Name:  Jim Willingham  Date:  4/15/2020  Contact Type:  Telephone    SUBJECTIVE:  Patient Findings     Positives:   Change in diet/appetite (Pt plans to eat a small serving of asparagus today.)             OBJECTIVE    INR   Date Value Ref Range Status   04/15/2020 2.99 (H) 0.86 - 1.14 Final       ASSESSMENT / PLAN  No question data found.  Anticoagulation Summary  As of 4/15/2020    INR goal:   2.0-3.0   TTR:   71.3 % (11.7 mo)   INR used for dosin.99 (4/15/2020)   Warfarin maintenance plan:   10 mg (5 mg x 2) every Fri; 7.5 mg (5 mg x 1.5) all other days   Full warfarin instructions:   10 mg every Fri; 7.5 mg all other days   Weekly warfarin total:   55 mg   Plan last modified:   Delisa Hillman RN (3/17/2020)   Next INR check:   2020   Priority:   Critical   Target end date:       Indications    LVAD (left ventricular assist device) present (H) [Z95.811]  Long-term (current) use of anticoagulants [Z79.01] [Z79.01]             Anticoagulation Episode Summary     INR check location:       Preferred lab:       Send INR reminders to:   [facility]    Comments:   HIPPA Forms mailed 17  Labs drawn either at [facility] or [facility], See 3/5/20 ADDENDUM, pt. weekly INR >2.0 for Cardioversion  LVAD placed 17   II  ASA 81 mg daily      Anticoagulation Care Providers     Provider Role Specialty Phone number    Delisa Montgomery MD [facility] Cardiology [phone]            See the Encounter Report to view Anticoagulation Flowsheet and Dosing Calendar (Go to Encounters tab in chart review, and find the Anticoagulation Therapy Visit)  Spoke with patient.  Patient had LVAD placed on:   17  Type of LVAD: HM2  Patient's current Aspirin dose: 81mg  LVAD Protocol followed:  Yes-3 weeks to next INR per to adjusted coronavirus protocol.        Delisa Hillman RN

## 2020-04-17 DIAGNOSIS — I50.22 CHRONIC SYSTOLIC CONGESTIVE HEART FAILURE (H): Primary | ICD-10-CM

## 2020-04-20 NOTE — PROGRESS NOTES
Patient calling to initiate prenatal care  LMP 3/8/20  Patient is 7-8 weeks on   Confirmation Ultrasound and Appointment scheduled on   Future Appointments   Date Time Provider Cirilo Khanna   4/29/2020  1:00 PM [facility] US [facility] [address] Pt feels about the same  Mild SOB with activity, no chest pain  Appetite diminished, states food just doesn't taste good    VSS  BP 80s-90s-70s  02 sats upper 90s RA  Wt 112 Kg (113.2 Kg on admission)  BG 80s-low 100s    Alert, fully oriented  Lungs clear  CV rrr  Abd soft  No oral lesions  Trace LE edema B    INR 2.85        Assessment    S/p LVAD (destination therapy) for NICM.  Wt stable (on reduced dose of Bumex 1 mg bid)      Post op SVT/Afib, stable on Metoprolol     CKD, stable on 7/1/17 labs     DM  BG very well controlled, on reduced dose of Metformin, likely secondary to decreased intake.  Unclear cause for decreased appetite, dysgeusia (inhalers ?)     Asthma, stable on current tx     CECILIA on CPAP    Plan  Continue to closely monitor wt, exam, BMP, BG, INR  Consider d/c metformin

## 2020-04-24 DIAGNOSIS — I50.22 CHRONIC SYSTOLIC CONGESTIVE HEART FAILURE (H): Primary | ICD-10-CM

## 2020-04-30 ENCOUNTER — ANTICOAGULATION THERAPY VISIT (OUTPATIENT)
Dept: ANTICOAGULATION | Facility: CLINIC | Age: 65
End: 2020-04-30

## 2020-04-30 ENCOUNTER — CARE COORDINATION (OUTPATIENT)
Dept: CARDIOLOGY | Facility: CLINIC | Age: 65
End: 2020-04-30

## 2020-04-30 DIAGNOSIS — Z95.811 LVAD (LEFT VENTRICULAR ASSIST DEVICE) PRESENT (H): ICD-10-CM

## 2020-04-30 DIAGNOSIS — I50.22 CHRONIC SYSTOLIC CONGESTIVE HEART FAILURE (H): ICD-10-CM

## 2020-04-30 DIAGNOSIS — Z79.01 LONG TERM CURRENT USE OF ANTICOAGULANT THERAPY: ICD-10-CM

## 2020-04-30 LAB
ALBUMIN SERPL-MCNC: 3.8 G/DL (ref 3.4–5)
ALP SERPL-CCNC: 105 U/L (ref 40–150)
ALT SERPL W P-5'-P-CCNC: 35 U/L (ref 0–70)
ANION GAP SERPL CALCULATED.3IONS-SCNC: 7 MMOL/L (ref 3–14)
AST SERPL W P-5'-P-CCNC: 32 U/L (ref 0–45)
BILIRUB SERPL-MCNC: 0.8 MG/DL (ref 0.2–1.3)
BUN SERPL-MCNC: 32 MG/DL (ref 7–30)
CALCIUM SERPL-MCNC: 8.2 MG/DL (ref 8.5–10.1)
CHLORIDE SERPL-SCNC: 103 MMOL/L (ref 94–109)
CO2 SERPL-SCNC: 27 MMOL/L (ref 20–32)
CREAT SERPL-MCNC: 1.44 MG/DL (ref 0.66–1.25)
ERYTHROCYTE [DISTWIDTH] IN BLOOD BY AUTOMATED COUNT: 17.2 % (ref 10–15)
GFR SERPL CREATININE-BSD FRML MDRD: 51 ML/MIN/{1.73_M2}
GLUCOSE SERPL-MCNC: 82 MG/DL (ref 70–99)
HCT VFR BLD AUTO: 40.3 % (ref 40–53)
HGB BLD-MCNC: 12 G/DL (ref 13.3–17.7)
INR PPP: 3.34 (ref 0.86–1.14)
LDH SERPL L TO P-CCNC: 382 U/L (ref 85–227)
MCH RBC QN AUTO: 24.6 PG (ref 26.5–33)
MCHC RBC AUTO-ENTMCNC: 29.8 G/DL (ref 31.5–36.5)
MCV RBC AUTO: 83 FL (ref 78–100)
PLATELET # BLD AUTO: 278 10E9/L (ref 150–450)
POTASSIUM SERPL-SCNC: 3.4 MMOL/L (ref 3.4–5.3)
PROT SERPL-MCNC: 6.8 G/DL (ref 6.8–8.8)
RBC # BLD AUTO: 4.87 10E12/L (ref 4.4–5.9)
SODIUM SERPL-SCNC: 137 MMOL/L (ref 133–144)
WBC # BLD AUTO: 9.5 10E9/L (ref 4–11)

## 2020-04-30 PROCEDURE — 80053 COMPREHEN METABOLIC PANEL: CPT | Performed by: INTERNAL MEDICINE

## 2020-04-30 PROCEDURE — 83615 LACTATE (LD) (LDH) ENZYME: CPT | Performed by: INTERNAL MEDICINE

## 2020-04-30 PROCEDURE — 85027 COMPLETE CBC AUTOMATED: CPT | Performed by: INTERNAL MEDICINE

## 2020-04-30 PROCEDURE — 36415 COLL VENOUS BLD VENIPUNCTURE: CPT | Performed by: INTERNAL MEDICINE

## 2020-04-30 PROCEDURE — 85610 PROTHROMBIN TIME: CPT | Performed by: INTERNAL MEDICINE

## 2020-04-30 NOTE — PROGRESS NOTES
ANTICOAGULATION FOLLOW-UP CLINIC VISIT    Patient Name:  Jim Willingham  Date:  4/30/2020  Contact Type:  Telephone    SUBJECTIVE:  Patient Findings     Comments:   Patient will eat some eat some asparagus throughout the next the couple of weeks.         Clinical Outcomes     Comments:   Patient will eat some eat some asparagus throughout the next the couple of weeks.            OBJECTIVE    INR   Date Value Ref Range Status   04/30/2020 3.34 (H) 0.86 - 1.14 Final       ASSESSMENT / PLAN  No question data found.  Anticoagulation Summary  As of 4/30/2020    INR goal:   2.0-3.0   TTR:   67.1 % (11.7 mo)   INR used for dosing:   3.34! (4/30/2020)   Warfarin maintenance plan:   10 mg (5 mg x 2) every Fri; 7.5 mg (5 mg x 1.5) all other days   Full warfarin instructions:   5/1: 7.5 mg; Otherwise 10 mg every Fri; 7.5 mg all other days   Weekly warfarin total:   55 mg   Plan last modified:   Delisa Hillman RN (3/17/2020)   Next INR check:   5/14/2020   Priority:   Critical   Target end date:       Indications    LVAD (left ventricular assist device) present (H) [Z95.811]  Long-term (current) use of anticoagulants [Z79.01] [Z79.01]             Anticoagulation Episode Summary     INR check location:       Preferred lab:       Send INR reminders to:   [facility]    Comments:   HIPPA Forms mailed 6/26/17  Labs drawn either at [facility] or [facility], See 3/5/20 ADDENDUM, pt. weekly INR >2.0 for Cardioversion  LVAD placed 6/19/17   II  ASA 81 mg daily      Anticoagulation Care Providers     Provider Role Specialty Phone number    Delisa Montgomery MD Responsible Cardiology [phone]            See the Encounter Report to view Anticoagulation Flowsheet and Dosing Calendar (Go to Encounters tab in chart review, and find the Anticoagulation Therapy Visit)    Spoke with patient.     Maru Alonso RN

## 2020-04-30 NOTE — PROGRESS NOTES
Pt forgot to get labs for tomorrow's virtual appt. Pt has been very busy with home schooling his grandson who has special needs. He will do his best to get them this evening when his wife gets home from work if clinic is still open.

## 2020-05-01 ENCOUNTER — VIRTUAL VISIT (OUTPATIENT)
Dept: CARDIOLOGY | Facility: CLINIC | Age: 65
End: 2020-05-01
Attending: INTERNAL MEDICINE
Payer: COMMERCIAL

## 2020-05-01 VITALS — HEIGHT: 68 IN | WEIGHT: 246 LBS | BODY MASS INDEX: 37.28 KG/M2

## 2020-05-01 DIAGNOSIS — I50.22 CHRONIC SYSTOLIC CONGESTIVE HEART FAILURE (H): Primary | ICD-10-CM

## 2020-05-01 DIAGNOSIS — Z95.811 LVAD (LEFT VENTRICULAR ASSIST DEVICE) PRESENT (H): ICD-10-CM

## 2020-05-01 DIAGNOSIS — Z95.811 LVAD (LEFT VENTRICULAR ASSIST DEVICE) PRESENT (H): Primary | ICD-10-CM

## 2020-05-01 PROCEDURE — 99214 OFFICE O/P EST MOD 30 MIN: CPT | Mod: GT | Performed by: INTERNAL MEDICINE

## 2020-05-01 ASSESSMENT — MIFFLIN-ST. JEOR: SCORE: 1890.35

## 2020-05-01 ASSESSMENT — PAIN SCALES - GENERAL: PAINLEVEL: NO PAIN (0)

## 2020-05-01 NOTE — PROGRESS NOTES
"  May 1, 2020    Jim Willingham is a 64 year old male who is being evaluated via a billable video visit.      The patient has been notified of following:     \"This video visit will be conducted via a call between you and your physician/provider. We have found that certain health care needs can be provided without the need for an in-person physical exam.  This service lets us provide the care you need with a video conversation.  If a prescription is necessary we can send it directly to your pharmacy.  If lab work is needed we can place an order for that and you can then stop by our lab to have the test done at a later time.    Video visits are billed at different rates depending on your insurance coverage.  Please reach out to your insurance provider with any questions.    If during the course of the call the physician/provider feels a video visit is not appropriate, you will not be charged for this service.\"    Patient has given verbal consent for Video visit? Yes    How would you like to obtain your AVS? Mary    Patient would like the video invitation sent by: Send to e-mail at: [email or web address]    Will anyone else be joining your video visit? No       Weight and height were given by the patient and medications were reconciled.     April López CMA    10:20 AM      Video-Visit Details    Type of service:  Video Visit    Video Start Time: 11:15   Video End Time: 11:45     Originating Location (pt. Location): home     Distant Location (provider location):  [facility] HEART [facility]     Platform [facility] for Video Visit[facility]     May 1, 2020      HPI:   Jim Willingham is a 63 year old male with a past medical history of NICM (EF 20%) s/p HM II LVAD placement (6/19/17) at DT (obesity) - clinic visit is for LVAD follow up     Th patient reports that he has not been feeling well for some time.     He was noted to be in atrial fibrillation starting earlier this year.  He did see electrophysiology as the pacemaker settings " were changed and there was some discussion about whether or not he should have a cardioversion or just leave him in atrial fibrillation.     He was admitted with pneumonia at the end of February and has not felt the same since.     He is presently below reporting shortness of breath walking around the house.  He also has intermittent dizziness.  He is also reporting fatigue and some headaches.     He denies overt stroke symptoms.  He feels as though he does not have increased lower extremity edema or increased abdominal girth.  He just   Does not feel quite right and is hard for him to pinpoint why    He denies any chest pain, edema,  orthopnea, PND. He denies fevers, chills, driveline redness, tenderness, or discharge. No LVAD alarms or blood in the urine or stools.      PAST MEDICAL HISTORY:  Past Medical History:   Diagnosis Date     Benign essential hypertension 5/11/2017     Cardiomyopathy, unspecified (H) 5/8/2017     CKD (chronic kidney disease) stage 3, GFR 30-59 ml/min (H) 5/11/2017     Depression 5/11/2017     Diabetes mellitus (H) 5/11/2017     H/O gastric bypass 5/11/2017     ICD (implantable cardioverter-defibrillator), biventricular, in situ 5/11/2017     LVAD (left ventricular assist device) present (H)      NICM (nonischemic cardiomyopathy) (H)/ EF 20% 5/11/2017    ECHO: LVEDd. 7.66 cm, Restrictive pattern , Severe mitral valve regurgitation     CECILIA (obstructive sleep apnea) 5/11/2017     Paroxysmal atrial fibrillation (H) 5/11/2017     Paroxysmal VT (H) 5/11/2017     Uncomplicated asthma      Vitamin B12 deficiency (non anemic) 5/11/2017       FAMILY HISTORY:  Family History   Problem Relation Age of Onset     Cerebrovascular Disease Mother      Diabetes Mother      Hypertension Mother      Coronary Artery Disease Father      Diabetes Type 2  Father      SOCIAL HISTORY:  No changes     CURRENT MEDICATIONS:  Current Outpatient Medications   Medication Sig Dispense Refill     acetaminophen (TYLENOL)  325 MG tablet Take 650 mg by mouth every 6 hours as needed for mild pain       albuterol (ALBUTEROL) 108 (90 BASE) MCG/ACT Inhaler Inhale 2 puffs into the lungs every 4 hours as needed for shortness of breath / dyspnea or wheezing       allopurinol (ZYLOPRIM) 100 MG tablet Take 1 tablet (100 mg) by mouth daily 60 tablet 5     aspirin 81 MG chewable tablet 1 tablet (81 mg) by Oral or Feeding Tube route daily 36 tablet      blood glucose monitoring (ONE TOUCH ULTRA 2) meter device kit Use to test blood sugar four times daily or as directed. 1 kit 0     blood glucose VI test strip Use to test blood sugar four times daily or as directed. 200 each 0     bumetanide (BUMEX) 1 MG tablet Take 2 tablets (2 mg) by mouth 2 times daily 120 tablet 11     citalopram (CELEXA) 20 MG tablet Take 20 mg by mouth daily       cyanocobalamin (VITAMIN B12) 1000 MCG/ML injection Inject 1,000 mcg into the muscle every 30 days       fluticasone-vilanterol (BREO ELLIPTA) 200-25 MCG/INH oral inhaler Inhale 1 puff into the lungs daily       gabapentin (NEURONTIN) 300 MG capsule Take 1 capsule (300 mg) by mouth twice daily and 2 capsules (600 mg) by mouth at bedtime daily.       insulin lispro (HUMALOG KWIKPEN) 100 UNIT/ML (1 unit dial) pen Inject 1-7 Units Subcutaneous 4 times daily 3 mL 1     insulin pen needle (32G X 4 MM) 32G X 4 MM miscellaneous Use four pen needles daily or as directed. 110 each 0     losartan (COZAAR) 25 MG tablet Take 12.5 mg by mouth 2 times daily 60 tablet 0     metFORMIN (GLUCOPHAGE) 500 MG tablet Take 1 tablet (500 mg) by mouth 2 times daily (with meals) 60 tablet 0     metoprolol succinate ER (TOPROL-XL) 25 MG 24 hr tablet Take 1 tablet (25 mg) by mouth 2 times daily 270 tablet 2     montelukast (SINGULAIR) 10 MG tablet Take 10 mg by mouth At Bedtime       pantoprazole (PROTONIX) 40 MG EC tablet Take 1 tablet (40 mg) by mouth every morning 60 tablet 5     potassium chloride ER (K-TAB/KLOR-CON) 10 MEQ CR tablet Take 2  tablets (20 mEq) by mouth daily 120 tablet 5     predniSONE (DELTASONE) 20 MG tablet Take 1 tablet (20 mg) by mouth daily 14 tablet 0     spironolactone (ALDACTONE) 25 MG tablet Take 25 mg by mouth 2 times daily       traMADol (ULTRAM) 50 MG tablet Take 2 tablets (100 mg) by mouth every 6 hours as needed for moderate pain or breakthrough pain 28 tablet      umeclidinium (INCRUSE ELLIPTA) 62.5 MCG/INH oral inhaler Inhale 1 puff into the lungs daily       warfarin (COUMADIN) 5 MG tablet Take 5 - 7.5mg daily or as directed by coumadin clinic. 90 tablet 5     levalbuterol (XOPENEX) 1.25 MG/3ML neb solution Take 3 mLs (1.25 mg) by nebulization every 6 hours as needed for shortness of breath / dyspnea or wheezing (Patient not taking: Reported on 5/1/2020) 30 mL 0     zinc sulfate (ZINCATE) 220 (50 ZN) MG capsule Take 220 mg by mouth 2 times daily       Review of systems: 12 point review of systems negative unless noted in the HPI-       EXAM:    Constitutional - well appearing     Eyes - no redness, discharge    Respiratory - no cough, breathing is comfortable     Musculoskeletal - normal range of motion    Skin - no discoloration, lesions    Neurological - no tremors, headaches, normal gait     Psychiatric - no anxiety, patient is alert & oriented    The rest of a comprehensive physical examination is deferred due to PHE (public health emergency) video visit restrictions      Interpretation Summary  Technically difficult study. Patients heart rate is in the 120s-150s during  the study.  HM2 LVAD is present and is funcioning at 9400RPM     Severely (EF 10-15%) reduced left ventricular function is present. Severe left  ventricular dilation is present. LVIDd is 6.6cm LVAD cannula in the apex is  not well visualized. Inflow and outflow velocities could not be obtained.  Aortic valve appears to open minimally with each beat. Septum is probably  midline but again difficult to visualize.  Global right ventricular function is  moderately to severely reduced.  IVC diameter >2.1 cm collapsing <50% with sniff suggests a high RA pressure  estimated at 15 mmHg or greater. No pericardial effusion is present.     Compared to prior study on 1/16/20, LVIDd appears slightly larger. Patient is  severely tachycardic now. IVC appears more dilated.    ICD interrogation:   Patient seen in [facility] for evaluation and iterative programming of a [employer] Bi-V ICD per MD orders. Patient has an appointment to see Dr. Montgomery today. Normal ICD function. 6 NSVT episodes recorded lasting 2 - 4 sec @ 180- 231 bpm. Intrinsic rhythm = SR @ 93 bpm. AP = 2%.  = 91.7%. OptiVol fluid index is at the baseline. Estimated battery longevity to MARVA = 2.3 years. No short v-v intervals recorded. Lead trends appear stable. Patient reports that he is feeling well other than having some type of allergic reaction to a peach last night. He reports his eyes and hands became very itchy after eating the peach. He took Benadryl last night and this morning and is feeling better but still experiencing some itching. Plan for patient to send a remote transmission in 3 months and return to clinic in 6 months. EDUARDO Cain RN, BSN. Multi lead ICDI have reviewed and interpreted the device interrogation, settings, programming and nurse's summary. The device is functioning within normal device parameters. I agree with the current findings, assessment and plan.      Echo 1/20/2020   Interpretation Summary  Technically difficult study. Patients heart rate is in the 120s-150s during  the study.  HM2 LVAD is present and is funcioning at 9400RPM     Severely (EF 10-15%) reduced left ventricular function is present. Severe left  ventricular dilation is present. LVIDd is 6.6cm LVAD cannula in the apex is  not well visualized. Inflow and outflow velocities could not be obtained.  Aortic valve appears to open minimally with each beat. Septum is probably  midline but again difficult to visualize.  Global  right ventricular function is moderately to severely reduced.  IVC diameter >2.1 cm collapsing <50% with sniff suggests a high RA pressure  estimated at 15 mmHg or greater. No pericardial effusion is present.      July 2019   RA Pressures 10:04 AM   10 mmHg    80071 mmHg    62388 mmHg      55 bpm      RV Pressures  9:42 AM       384 mmHg/sec        10:01 AM 25 mmHg        10 mmHg     39 bpm      PA Pressures 10:01 AM 25 mmHg    16 mmHg    19 mmHg        80 bpm      PCW Pressures 10:02 AM   11 mmHg    70728 mmHg    07797 mmHg      68 bpm      Blood Flow Results Phase: Baseline      Time Results Indexed Values   QP  9:42 AM 4.62 L/min    2.01 L/min/m2      QS  9:42 AM 4.62 L/min    2.01 L/min/m2          Assessment and Plan:    In summary Jim Willingham has a history of NICM (EF 20%) s/p HM II LVAD as DT (obesity).  Overall he has been doing well.   His hemodynamics over the summer were reassuring that his wedge pressure is normal and his index is preserved.    He does have higher right-sided filling pressures than left and moderate RV dysfunction on his echocardiogram but is overall doing quite well. The new AF is likely not helping RV function-     Given that he has sort of vague symptoms of worsening exercise tolerance and shortness of breath I am favoring proceeding with cardioversion.  His INRs have been therapeutic over the last month.     If he continues to feel poorly after cardioversion then would plan on scheduling a right heart catheterization to determine whether he needs any speed changes or further treatment of right ventricular dysfunction.       In the larger picture, the biggest barriers to cardiac transplant include his BMI and we will also need to carefully look at his lung function as we get closer to transplant listing (he has had frequent bronchitis requiring nebulizers etc-although this is been better recently)    Chronic systolic heart failure secondary to NICM.    Stage D    NYHA Class  III  ACEi/ARB: yes  BB: on Toprol XL YES   Aldosterone antagonist: Aldactone to 25 mg daily.   SCD prophylaxis: CRT-D  Fluid status: relatively euvolemic by video exam today       MAP controlled on current regimen.  Anticoagulation: Warfarin INR goal 2-3.   Antiplatelet: restart ASA dose 81 mg daily.  LDH: Stable     CKD Stage III:Overall stable     Paroxysmal Atrial Fibrillation.  Now again in atrial fibrillation see above plan for cardioversion I would not do amiodarone given his history of severe lung disease      History of ventricular tachycardia with appropriate therapy-we will not change medical therapy for now.     Cardiac transplant eligibility: he needs to lose some more weight.  I have given him a clear exercise prescription to follow going forward I want him exercising 30 minutes 4-5 times per week and it should feel difficult.  We also informed him that we are recommending high intensity interval training to assist with weight loss and muscle conditioning  specifics were provided    Obesity - see above plan     Plan for cardioversion then follow-up with NP a week or 2 after that to ensure that he is in sinus rhythm and to see if he is better.  If he is not looking for right heart catheterization after that.     Delisa Montgomery MD   of Medicine   [facility] Division of Cardiology

## 2020-05-04 DIAGNOSIS — Z11.59 ENCOUNTER FOR SCREENING FOR OTHER VIRAL DISEASES: Primary | ICD-10-CM

## 2020-05-04 DIAGNOSIS — I48.0 PAF (PAROXYSMAL ATRIAL FIBRILLATION) (H): Primary | ICD-10-CM

## 2020-05-04 RX ORDER — POTASSIUM CHLORIDE 1500 MG/1
40 TABLET, EXTENDED RELEASE ORAL
Status: CANCELLED | OUTPATIENT
Start: 2020-05-04

## 2020-05-04 RX ORDER — MAGNESIUM SULFATE HEPTAHYDRATE 40 MG/ML
2 INJECTION, SOLUTION INTRAVENOUS
Status: CANCELLED | OUTPATIENT
Start: 2020-05-04

## 2020-05-04 RX ORDER — POTASSIUM CHLORIDE 1500 MG/1
20 TABLET, EXTENDED RELEASE ORAL
Status: CANCELLED | OUTPATIENT
Start: 2020-05-04

## 2020-05-04 RX ORDER — LIDOCAINE 40 MG/G
CREAM TOPICAL
Status: CANCELLED | OUTPATIENT
Start: 2020-05-04

## 2020-05-07 NOTE — PLAN OF CARE
Notes recorded by Ivana Majano MD on 5/5/2020 at 12:53 PM CDT  Please notify patient mammogram shows findings of benign cysts.  No concerning lesions noted.  Recommendation for routine mammogram in 1 year.  Please advise patient to schedule appointmen Problem: Goal/Outcome  Goal: Goal Outcome Summary  FOCUS/GOAL  Bowel management, Bladder management, Pain management, Equipment/Assistive devices and Safety management     ASSESSMENT, INTERVENTIONS AND CONTINUING PLAN FOR GOAL:  Pt is alert and oriented. Continent of bladder, voiding spontaneously. No BM, passing flatus. Pain managed with Dilaudid q 3 hours. LVAD parameters within normal limits; no alarms, dressing is c/d/i. Pt uses call light appropriately, able to make needs known. Will continue with POC.

## 2020-05-08 ENCOUNTER — OFFICE VISIT (OUTPATIENT)
Dept: URGENT CARE | Facility: URGENT CARE | Age: 65
End: 2020-05-08
Payer: COMMERCIAL

## 2020-05-08 DIAGNOSIS — Z11.59 ENCOUNTER FOR SCREENING FOR OTHER VIRAL DISEASES: ICD-10-CM

## 2020-05-08 LAB
SARS-COV-2 PCR COMMENT: NORMAL
SARS-COV-2 RNA SPEC QL NAA+PROBE: NEGATIVE
SARS-COV-2 RNA SPEC QL NAA+PROBE: NORMAL
SPECIMEN SOURCE: NORMAL
SPECIMEN SOURCE: NORMAL

## 2020-05-08 PROCEDURE — 87635 SARS-COV-2 COVID-19 AMP PRB: CPT | Performed by: NURSE PRACTITIONER

## 2020-05-08 PROCEDURE — 99207 ZZC NO BILLABLE SERVICE THIS VISIT: CPT

## 2020-05-11 ENCOUNTER — ANESTHESIA (OUTPATIENT)
Dept: SURGERY | Facility: CLINIC | Age: 65
End: 2020-05-11
Payer: COMMERCIAL

## 2020-05-11 ENCOUNTER — HOSPITAL ENCOUNTER (OUTPATIENT)
Facility: CLINIC | Age: 65
Discharge: HOME | End: 2020-05-11
Attending: INTERNAL MEDICINE | Admitting: INTERNAL MEDICINE
Payer: COMMERCIAL

## 2020-05-11 ENCOUNTER — ANESTHESIA EVENT (OUTPATIENT)
Dept: SURGERY | Facility: CLINIC | Age: 65
End: 2020-05-11
Payer: COMMERCIAL

## 2020-05-11 ENCOUNTER — ANCILLARY PROCEDURE (OUTPATIENT)
Dept: CARDIOLOGY | Facility: CLINIC | Age: 65
End: 2020-05-11
Attending: INTERNAL MEDICINE
Payer: COMMERCIAL

## 2020-05-11 ENCOUNTER — HOSPITAL ENCOUNTER (OUTPATIENT)
Dept: CARDIOLOGY | Facility: CLINIC | Age: 65
End: 2020-05-11
Attending: NURSE PRACTITIONER | Admitting: INTERNAL MEDICINE
Payer: COMMERCIAL

## 2020-05-11 ENCOUNTER — APPOINTMENT (OUTPATIENT)
Dept: LAB | Facility: CLINIC | Age: 65
End: 2020-05-11
Attending: INTERNAL MEDICINE
Payer: COMMERCIAL

## 2020-05-11 ENCOUNTER — ANTICOAGULATION THERAPY VISIT (OUTPATIENT)
Dept: ANTICOAGULATION | Facility: CLINIC | Age: 65
End: 2020-05-11

## 2020-05-11 ENCOUNTER — APPOINTMENT (OUTPATIENT)
Dept: MEDSURG UNIT | Facility: CLINIC | Age: 65
End: 2020-05-11
Attending: INTERNAL MEDICINE
Payer: COMMERCIAL

## 2020-05-11 VITALS
HEART RATE: 62 BPM | OXYGEN SATURATION: 99 % | SYSTOLIC BLOOD PRESSURE: 100 MMHG | RESPIRATION RATE: 16 BRPM | DIASTOLIC BLOOD PRESSURE: 86 MMHG

## 2020-05-11 VITALS
HEART RATE: 66 BPM | OXYGEN SATURATION: 97 % | DIASTOLIC BLOOD PRESSURE: 77 MMHG | SYSTOLIC BLOOD PRESSURE: 113 MMHG | RESPIRATION RATE: 15 BRPM

## 2020-05-11 DIAGNOSIS — I42.9 CARDIOMYOPATHY (H): Primary | ICD-10-CM

## 2020-05-11 DIAGNOSIS — I42.9 CARDIOMYOPATHY (H): ICD-10-CM

## 2020-05-11 DIAGNOSIS — Z95.811 LVAD (LEFT VENTRICULAR ASSIST DEVICE) PRESENT (H): ICD-10-CM

## 2020-05-11 DIAGNOSIS — I48.0 PAF (PAROXYSMAL ATRIAL FIBRILLATION) (H): ICD-10-CM

## 2020-05-11 DIAGNOSIS — I50.22 CHRONIC SYSTOLIC CONGESTIVE HEART FAILURE (H): ICD-10-CM

## 2020-05-11 DIAGNOSIS — Z95.810 ICD (IMPLANTABLE CARDIOVERTER-DEFIBRILLATOR), BIVENTRICULAR, IN SITU: ICD-10-CM

## 2020-05-11 DIAGNOSIS — Z79.01 LONG TERM CURRENT USE OF ANTICOAGULANT THERAPY: ICD-10-CM

## 2020-05-11 LAB
INR PPP: 2.44 (ref 0.86–1.14)
INTERPRETATION ECG - MUSE: NORMAL
INTERPRETATION ECG - MUSE: NORMAL
MAGNESIUM SERPL-MCNC: 1.9 MG/DL (ref 1.6–2.3)
POTASSIUM SERPL-SCNC: 4.2 MMOL/L (ref 3.4–5.3)

## 2020-05-11 PROCEDURE — 40000065 ZZH STATISTIC EKG NON-CHARGEABLE

## 2020-05-11 PROCEDURE — 93284 PRGRMG EVAL IMPLANTABLE DFB: CPT | Mod: 26 | Performed by: INTERNAL MEDICINE

## 2020-05-11 PROCEDURE — 83735 ASSAY OF MAGNESIUM: CPT | Performed by: NURSE PRACTITIONER

## 2020-05-11 PROCEDURE — 40000166 ZZH STATISTIC PP CARE STAGE 1

## 2020-05-11 PROCEDURE — 93284 PRGRMG EVAL IMPLANTABLE DFB: CPT | Mod: XU

## 2020-05-11 PROCEDURE — 85610 PROTHROMBIN TIME: CPT | Performed by: NURSE PRACTITIONER

## 2020-05-11 PROCEDURE — 36415 COLL VENOUS BLD VENIPUNCTURE: CPT | Performed by: NURSE PRACTITIONER

## 2020-05-11 PROCEDURE — 37000008 ZZH ANESTHESIA TECHNICAL FEE, 1ST 30 MIN

## 2020-05-11 PROCEDURE — 84132 ASSAY OF SERUM POTASSIUM: CPT | Performed by: NURSE PRACTITIONER

## 2020-05-11 PROCEDURE — 25000125 ZZHC RX 250: Performed by: NURSE ANESTHETIST, CERTIFIED REGISTERED

## 2020-05-11 PROCEDURE — 92960 CARDIOVERSION ELECTRIC EXT: CPT

## 2020-05-11 PROCEDURE — 93010 ELECTROCARDIOGRAM REPORT: CPT | Mod: 76 | Performed by: INTERNAL MEDICINE

## 2020-05-11 PROCEDURE — 92960 CARDIOVERSION ELECTRIC EXT: CPT | Performed by: NURSE PRACTITIONER

## 2020-05-11 RX ADMIN — LIDOCAINE HYDROCHLORIDE 40 MG: 10 INJECTION, SOLUTION EPIDURAL; INFILTRATION; INTRACAUDAL; PERINEURAL at 11:10

## 2020-05-11 NOTE — ANESTHESIA PREPROCEDURE EVALUATION
Anesthesia Pre-Procedure Evaluation    Patient: Jim Willingham   MRN:     [id number] Gender:   male   Age:    64 year old :      1955        Preoperative Diagnosis: Afib (H) [I48.91]   Procedure(s):  ANESTHESIA, FOR CARDIOVERSION @1100     LABS:  CBC:   Lab Results   Component Value Date    WBC 9.5 2020    WBC 7.8 2020    HGB 12.0 (L) 2020    HGB 11.3 (L) 2020    HCT 40.3 2020    HCT 38.2 (L) 2020     2020     2020     BMP:   Lab Results   Component Value Date     2020     2020    POTASSIUM 3.4 2020    POTASSIUM 3.5 2020    CHLORIDE 103 2020    CHLORIDE 101 2020    CO2 27 2020    CO2 31 2020    BUN 32 (H) 2020    BUN 28 2020    CR 1.44 (H) 2020    CR 1.17 2020    GLC 82 2020     (H) 2020     COAGS:   Lab Results   Component Value Date    PTT 36 2019    INR 3.34 (H) 2020    FIBR 401 2017     POC:   Lab Results   Component Value Date     (H) 2020     OTHER:   Lab Results   Component Value Date    PH 7.35 2017    LACT 1.9 2020    A1C 7.0 (H) 2020    QAMAR 8.2 (L) 2020    PHOS 4.6 (H) 2017    MAG 2.3 2020    ALBUMIN 3.8 2020    PROTTOTAL 6.8 2020    ALT 35 2020    AST 32 2020    ALKPHOS 105 2020    BILITOTAL 0.8 2020    TSH 1.23 2019    CRP 26.0 (H) 2018    SED 18 2018        Preop Vitals    BP Readings from Last 3 Encounters:   20 (!) 78/0   20 (!) 88/0   20 105/40    Pulse Readings from Last 3 Encounters:   20 85   20 87   20 88      Resp Readings from Last 3 Encounters:   20 16   20 16   20 16    SpO2 Readings from Last 3 Encounters:   20 98%   20 97%   20 98%      Temp Readings from Last 1 Encounters:   20 36.5  C (97.7  F) (Oral)    Ht Readings from  "Last 1 Encounters:   05/01/20 1.727 m (5' 8\")      Wt Readings from Last 1 Encounters:   05/01/20 111.6 kg (246 lb)    Estimated body mass index is 37.4 kg/m  as calculated from the following:    Height as of 5/1/20: 1.727 m (5' 8\").    Weight as of 5/1/20: 111.6 kg (246 lb).     LDA:        Past Medical History:   Diagnosis Date     Benign essential hypertension 5/11/2017     Cardiomyopathy, unspecified (H) 5/8/2017     CKD (chronic kidney disease) stage 3, GFR 30-59 ml/min (H) 5/11/2017     Depression 5/11/2017     Diabetes mellitus (H) 5/11/2017     H/O gastric bypass 5/11/2017     ICD (implantable cardioverter-defibrillator), biventricular, in situ 5/11/2017     LVAD (left ventricular assist device) present (H)      NICM (nonischemic cardiomyopathy) (H)/ EF 20% 5/11/2017    ECHO: LVEDd. 7.66 cm, Restrictive pattern , Severe mitral valve regurgitation     CECILIA (obstructive sleep apnea) 5/11/2017     Paroxysmal atrial fibrillation (H) 5/11/2017     Paroxysmal VT (H) 5/11/2017     Uncomplicated asthma      Vitamin B12 deficiency (non anemic) 5/11/2017      Past Surgical History:   Procedure Laterality Date     CV RIGHT HEART CATH N/A 7/24/2019    Procedure: CV RIGHT HEART CATH;  Surgeon: Renu Sears MD;  Location:  HEART CARDIAC CATH LAB     GI SURGERY  2003    [facility]     INSERT VENTRICULAR ASSIST DEVICE LEFT (HEARTMATE II) N/A 6/19/2017    Procedure: INSERT VENTRICULAR ASSIST DEVICE LEFT (HEARTMATE II);  Median Sternotomy Heartmate II Left Ventricular Assist Device Insertion on Pump Oxygenator;  Surgeon: Ronnie Quigley MD;  Location:  OR     ORTHOPEDIC SURGERY  1994    right knee wired      Allergies   Allergen Reactions     Beer Shortness Of Breath     Grass Shortness Of Breath     Ace Inhibitors Unknown     Dust Mites Other (See Comments)     Asthma     Mold Other (See Comments)     Asthma     Penicillins      Sulfa Drugs Unknown and Other (See Comments)     PN: unknown             JZG FV AN " PHYSICAL EXAM    Assessment:   ASA SCORE: 4    H&P: History and physical reviewed and following examination; no interval change.   Smoking Status:  Non-Smoker/Unknown   NPO Status: NPO Appropriate     Plan:   Anes. Type:  General   Pre-Medication: None   Induction:  IV (Standard)   Airway: Native Airway   Access/Monitoring: PIV   Maintenance: Balanced     Postop Plan:   Postop Pain: Opioids  Postop Sedation/Airway: Not planned     PONV Management:   Adult Risk Factors:, Non-Smoker, Postop Opioids     CONSENT: Direct conversation   Plan and risks discussed with: Patient                      Sarah Wachter, MD

## 2020-05-11 NOTE — ANESTHESIA CARE TRANSFER NOTE
Patient: Jim Willingham    Procedure(s):  ANESTHESIA, FOR CARDIOVERSION @1100    Diagnosis: Afib (H) [I48.91]  Diagnosis Additional Information: No value filed.    Anesthesia Type:   No value filed.     Note:    Patient transferred to:Phase II  Comments: Anesthesia Care Transfer Note      Transfer to:  PACU    Patient Vital Signs:  Stable    Airway:  None    Patient alert and breathing comfortably.  VSS.  Care transferred with report to Echo RN.    Vini Santacruz CRNAHandoff Report: Identifed the Patient, Identified the Reponsible Provider, Reviewed the pertinent medical history, Discussed the surgical course, Reviewed Intra-OP anesthesia mangement and issues during anesthesia, Set expectations for post-procedure period and Allowed opportunity for questions and acknowledgement of understanding      Vitals: (Last set prior to Anesthesia Care Transfer)    CRNA VITALS  5/11/2020 1047 - 5/11/2020 1117      5/11/2020             NIBP:  (!) 111/94    Ht Rate:  81                Electronically Signed By: TIM Escobar CRNA  May 11, 2020  11:17 AM

## 2020-05-11 NOTE — PROGRESS NOTES
ANTICOAGULATION FOLLOW-UP CLINIC VISIT    Patient Name:  Jim Willingham  Date:  2020  Contact Type:  Telephone    SUBJECTIVE:  Patient Findings     Comments:   Patient had a cardioversion today        Clinical Outcomes     Comments:   Patient had a cardioversion today           OBJECTIVE    Recent labs: (last 7 days)     20  0938   INR 2.44*       ASSESSMENT / PLAN  INR assessment THER    Recheck INR In: 3 WEEKS    INR Location Clinic      Anticoagulation Summary  As of 2020    INR goal:   2.0-3.0   TTR:   66.8 % (11.7 mo)   INR used for dosin.44 (2020)   Warfarin maintenance plan:   10 mg (5 mg x 2) every Fri; 7.5 mg (5 mg x 1.5) all other days   Full warfarin instructions:   10 mg every Fri; 7.5 mg all other days   Weekly warfarin total:   55 mg   Plan last modified:   Delisa Hillman RN (3/17/2020)   Next INR check:   2020   Priority:   Critical   Target end date:   Indefinite    Indications    LVAD (left ventricular assist device) present (H) [Z95.811]  Long-term (current) use of anticoagulants [Z79.01] [Z79.01]  Chronic systolic congestive heart failure (H) [I50.22]             Anticoagulation Episode Summary     INR check location:       Preferred lab:       Send INR reminders to:   [facility]    Comments:   HIPPA Forms mailed 17  Labs drawn either at [facility] or [facility], See 3/5/20 ADDENDUM, pt. weekly INR >2.0 for Cardioversion  LVAD placed 17   II  ASA 81 mg daily      Anticoagulation Care Providers     Provider Role Specialty Phone number    Delisa Montgomery MD Referring Cardiology [phone]            See the Encounter Report to view Anticoagulation Flowsheet and Dosing Calendar (Go to Encounters tab in chart review, and find the Anticoagulation Therapy Visit)    Spoke with patient. Gave them their lab results and new warfarin recommendation.  No changes in health, medication, or diet. No missed doses, no falls. No signs or  symptoms of bleed or clotting.    Patient had LVAD placed on: 6/19/17  Type of LVAD: HM2  Patient's current Aspirin dose: 81mgs daily  LVAD Protocol followed: Yes     Maged Bedolla RPH

## 2020-05-11 NOTE — PROGRESS NOTES
Pt arrived in ECHO department for scheduled Cardioversion.   Procedure explained, questions answered and consent signed. Discharge instructions discussed with patient.  Anesthesia gave pt 40 mg IV brevitol for sedation and pt was DCCV at 150 Joules to a A paced rhythm. Verified by interrogation.   Pt denied any pain after procedure and was monitored until awake. Escorted to 2A for further recovery.   Report given to 2A RN.

## 2020-05-11 NOTE — PROGRESS NOTES
Pt to unit 2a post cardioversion. Takes PO intake well, denies pain. Wife notified by this RN. Post EKG ordered.

## 2020-05-11 NOTE — ANESTHESIA POSTPROCEDURE EVALUATION
Anesthesia POST Procedure Evaluation    Patient: Jim Willingham   MRN:     [id number] Gender:   male   Age:    64 year old :      1955        Preoperative Diagnosis: Afib (H) [I48.91]   Procedure(s):  ANESTHESIA, FOR CARDIOVERSION @1100   Postop Comments: No value filed.     Anesthesia Type: No value filed.       Disposition: Outpatient   Postop Pain Control: Uneventful            Sign Out: Well controlled pain   PONV: No   Neuro/Psych: Uneventful            Sign Out: Acceptable/Baseline neuro status   Airway/Respiratory: Uneventful            Sign Out: Acceptable/Baseline resp. status   CV/Hemodynamics: Uneventful            Sign Out: Acceptable CV status   Other NRE: NONE   DID A NON-ROUTINE EVENT OCCUR? No         Last Anesthesia Record Vitals:  CRNA VITALS  2020 1108 - 2020 1138      2020             NIBP:  (!) 111/94    Ht Rate:  81          Last PACU Vitals:  Vitals Value Taken Time   /77 2020 11:28 AM   Temp     Pulse 66 2020 11:28 AM   Resp 15 2020 11:28 AM   SpO2 97 % 2020 11:28 AM   Temp src     NIBP     Pulse     SpO2     Resp     Temp     Ht Rate     Temp 2           Electronically Signed By: Jermaine Noble MD, May 11, 2020, 11:38 AM

## 2020-05-11 NOTE — PROGRESS NOTES
Michelle Orozco, NP read post EKG-OK for pt to be discharged to home.  Pt's wife notified, she is omn the way to pick him up.  Pt tolerated ambulation in the hallway, tolerated po well.  discharge instructions given to pt by ECHO nurse, pt has no further questions.  PIV D/C'ed, catheter intact.  1305-Pt ready to be discharged, waiting for his ride home.  1315-Pt D/C'ed to home with his family.

## 2020-05-14 LAB
MDC_IDC_EPISODE_DTM: NORMAL
MDC_IDC_EPISODE_DURATION: 1061 S
MDC_IDC_EPISODE_DURATION: 1079 S
MDC_IDC_EPISODE_DURATION: 1114 S
MDC_IDC_EPISODE_DURATION: 1123 S
MDC_IDC_EPISODE_DURATION: 1129 S
MDC_IDC_EPISODE_DURATION: 12 S
MDC_IDC_EPISODE_DURATION: 1204 S
MDC_IDC_EPISODE_DURATION: 124 S
MDC_IDC_EPISODE_DURATION: 1273 S
MDC_IDC_EPISODE_DURATION: 13 S
MDC_IDC_EPISODE_DURATION: 133 S
MDC_IDC_EPISODE_DURATION: 140 S
MDC_IDC_EPISODE_DURATION: 141 S
MDC_IDC_EPISODE_DURATION: 141 S
MDC_IDC_EPISODE_DURATION: 1469 S
MDC_IDC_EPISODE_DURATION: 149 S
MDC_IDC_EPISODE_DURATION: 165 S
MDC_IDC_EPISODE_DURATION: 1742 S
MDC_IDC_EPISODE_DURATION: 176 S
MDC_IDC_EPISODE_DURATION: 1763 S
MDC_IDC_EPISODE_DURATION: 1807 S
MDC_IDC_EPISODE_DURATION: 1944 S
MDC_IDC_EPISODE_DURATION: 198 S
MDC_IDC_EPISODE_DURATION: 20 S
MDC_IDC_EPISODE_DURATION: 2056 S
MDC_IDC_EPISODE_DURATION: 206 S
MDC_IDC_EPISODE_DURATION: 2068 S
MDC_IDC_EPISODE_DURATION: 210 S
MDC_IDC_EPISODE_DURATION: 2102 S
MDC_IDC_EPISODE_DURATION: 2107 S
MDC_IDC_EPISODE_DURATION: 218 S
MDC_IDC_EPISODE_DURATION: 22 S
MDC_IDC_EPISODE_DURATION: 220 S
MDC_IDC_EPISODE_DURATION: 223 S
MDC_IDC_EPISODE_DURATION: 2325 S
MDC_IDC_EPISODE_DURATION: 2451 S
MDC_IDC_EPISODE_DURATION: 25 S
MDC_IDC_EPISODE_DURATION: 259 S
MDC_IDC_EPISODE_DURATION: 279 S
MDC_IDC_EPISODE_DURATION: 279 S
MDC_IDC_EPISODE_DURATION: 291 S
MDC_IDC_EPISODE_DURATION: 295 S
MDC_IDC_EPISODE_DURATION: 2964 S
MDC_IDC_EPISODE_DURATION: 31 S
MDC_IDC_EPISODE_DURATION: 326 S
MDC_IDC_EPISODE_DURATION: 328 S
MDC_IDC_EPISODE_DURATION: 336 S
MDC_IDC_EPISODE_DURATION: 339 S
MDC_IDC_EPISODE_DURATION: 3429 S
MDC_IDC_EPISODE_DURATION: 356 S
MDC_IDC_EPISODE_DURATION: 382 S
MDC_IDC_EPISODE_DURATION: 3997 S
MDC_IDC_EPISODE_DURATION: 40 S
MDC_IDC_EPISODE_DURATION: 4092 S
MDC_IDC_EPISODE_DURATION: 41 S
MDC_IDC_EPISODE_DURATION: 413 S
MDC_IDC_EPISODE_DURATION: 413 S
MDC_IDC_EPISODE_DURATION: 43 S
MDC_IDC_EPISODE_DURATION: 4739 S
MDC_IDC_EPISODE_DURATION: 475 S
MDC_IDC_EPISODE_DURATION: 48 S
MDC_IDC_EPISODE_DURATION: 50 S
MDC_IDC_EPISODE_DURATION: 515 S
MDC_IDC_EPISODE_DURATION: 52 S
MDC_IDC_EPISODE_DURATION: 550 S
MDC_IDC_EPISODE_DURATION: 567 S
MDC_IDC_EPISODE_DURATION: 5828 S
MDC_IDC_EPISODE_DURATION: 592 S
MDC_IDC_EPISODE_DURATION: 598 S
MDC_IDC_EPISODE_DURATION: 6 S
MDC_IDC_EPISODE_DURATION: 614 S
MDC_IDC_EPISODE_DURATION: 618 S
MDC_IDC_EPISODE_DURATION: 619 S
MDC_IDC_EPISODE_DURATION: 635 S
MDC_IDC_EPISODE_DURATION: 6786 S
MDC_IDC_EPISODE_DURATION: 70 S
MDC_IDC_EPISODE_DURATION: 7333 S
MDC_IDC_EPISODE_DURATION: 752 S
MDC_IDC_EPISODE_DURATION: 76 S
MDC_IDC_EPISODE_DURATION: 77 S
MDC_IDC_EPISODE_DURATION: 780 S
MDC_IDC_EPISODE_DURATION: 789 S
MDC_IDC_EPISODE_DURATION: 8 S
MDC_IDC_EPISODE_DURATION: 8 S
MDC_IDC_EPISODE_DURATION: 8367 S
MDC_IDC_EPISODE_DURATION: 8414 S
MDC_IDC_EPISODE_DURATION: 8651 S
MDC_IDC_EPISODE_DURATION: 92 S
MDC_IDC_EPISODE_DURATION: 929 S
MDC_IDC_EPISODE_DURATION: 933 S
MDC_IDC_EPISODE_DURATION: 942 S
MDC_IDC_EPISODE_DURATION: 956 S
MDC_IDC_EPISODE_DURATION: 9873 S
MDC_IDC_EPISODE_DURATION: NORMAL S
MDC_IDC_EPISODE_ID: 403
MDC_IDC_EPISODE_ID: 412
MDC_IDC_EPISODE_ID: 417
MDC_IDC_EPISODE_ID: 419
MDC_IDC_EPISODE_ID: 423
MDC_IDC_EPISODE_ID: 424
MDC_IDC_EPISODE_ID: 425
MDC_IDC_EPISODE_ID: 426
MDC_IDC_EPISODE_ID: 434
MDC_IDC_EPISODE_ID: 459
MDC_IDC_EPISODE_ID: 460
MDC_IDC_EPISODE_ID: 496
MDC_IDC_EPISODE_ID: 497
MDC_IDC_EPISODE_ID: 498
MDC_IDC_EPISODE_ID: 499
MDC_IDC_EPISODE_ID: 500
MDC_IDC_EPISODE_ID: 564
MDC_IDC_EPISODE_ID: 565
MDC_IDC_EPISODE_ID: 566
MDC_IDC_EPISODE_ID: 567
MDC_IDC_EPISODE_ID: 568
MDC_IDC_EPISODE_ID: 569
MDC_IDC_EPISODE_ID: 570
MDC_IDC_EPISODE_ID: 571
MDC_IDC_EPISODE_ID: 580
MDC_IDC_EPISODE_ID: 581
MDC_IDC_EPISODE_ID: 609
MDC_IDC_EPISODE_ID: 610
MDC_IDC_EPISODE_ID: 611
MDC_IDC_EPISODE_ID: 612
MDC_IDC_EPISODE_ID: 613
MDC_IDC_EPISODE_ID: 614
MDC_IDC_EPISODE_ID: 615
MDC_IDC_EPISODE_ID: 651
MDC_IDC_EPISODE_ID: 652
MDC_IDC_EPISODE_ID: 653
MDC_IDC_EPISODE_ID: 654
MDC_IDC_EPISODE_ID: 655
MDC_IDC_EPISODE_ID: 656
MDC_IDC_EPISODE_ID: 657
MDC_IDC_EPISODE_ID: 658
MDC_IDC_EPISODE_ID: 659
MDC_IDC_EPISODE_ID: 660
MDC_IDC_EPISODE_ID: 661
MDC_IDC_EPISODE_ID: 662
MDC_IDC_EPISODE_ID: 663
MDC_IDC_EPISODE_ID: 664
MDC_IDC_EPISODE_ID: 665
MDC_IDC_EPISODE_ID: 666
MDC_IDC_EPISODE_ID: 667
MDC_IDC_EPISODE_ID: 668
MDC_IDC_EPISODE_ID: 669
MDC_IDC_EPISODE_ID: 670
MDC_IDC_EPISODE_ID: 671
MDC_IDC_EPISODE_ID: 672
MDC_IDC_EPISODE_ID: 673
MDC_IDC_EPISODE_ID: 674
MDC_IDC_EPISODE_ID: 675
MDC_IDC_EPISODE_ID: 676
MDC_IDC_EPISODE_ID: 677
MDC_IDC_EPISODE_ID: 678
MDC_IDC_EPISODE_ID: 679
MDC_IDC_EPISODE_ID: 680
MDC_IDC_EPISODE_ID: 681
MDC_IDC_EPISODE_ID: 682
MDC_IDC_EPISODE_ID: 683
MDC_IDC_EPISODE_ID: 684
MDC_IDC_EPISODE_ID: 685
MDC_IDC_EPISODE_ID: 686
MDC_IDC_EPISODE_ID: 687
MDC_IDC_EPISODE_ID: 688
MDC_IDC_EPISODE_ID: 689
MDC_IDC_EPISODE_ID: 690
MDC_IDC_EPISODE_ID: 691
MDC_IDC_EPISODE_ID: 692
MDC_IDC_EPISODE_ID: 693
MDC_IDC_EPISODE_ID: 694
MDC_IDC_EPISODE_ID: 695
MDC_IDC_EPISODE_ID: 696
MDC_IDC_EPISODE_ID: 697
MDC_IDC_EPISODE_ID: 698
MDC_IDC_EPISODE_ID: 699
MDC_IDC_EPISODE_ID: 700
MDC_IDC_EPISODE_ID: 701
MDC_IDC_EPISODE_ID: 702
MDC_IDC_EPISODE_ID: 703
MDC_IDC_EPISODE_ID: 704
MDC_IDC_EPISODE_ID: 705
MDC_IDC_EPISODE_ID: NORMAL
MDC_IDC_EPISODE_TYPE: NORMAL
MDC_IDC_LEAD_IMPLANT_DT: NORMAL
MDC_IDC_LEAD_LOCATION: NORMAL
MDC_IDC_LEAD_LOCATION_DETAIL_1: NORMAL
MDC_IDC_LEAD_MFG: NORMAL
MDC_IDC_LEAD_MODEL: NORMAL
MDC_IDC_LEAD_POLARITY_TYPE: NORMAL
MDC_IDC_LEAD_SERIAL: NORMAL
MDC_IDC_MSMT_BATTERY_DTM: NORMAL
MDC_IDC_MSMT_BATTERY_REMAINING_LONGEVITY: 12 MO
MDC_IDC_MSMT_BATTERY_RRT_TRIGGER: 2.73
MDC_IDC_MSMT_BATTERY_STATUS: NORMAL
MDC_IDC_MSMT_BATTERY_VOLTAGE: 2.87 V
MDC_IDC_MSMT_CAP_CHARGE_DTM: NORMAL
MDC_IDC_MSMT_CAP_CHARGE_ENERGY: 18 J
MDC_IDC_MSMT_CAP_CHARGE_TIME: 4.28
MDC_IDC_MSMT_CAP_CHARGE_TYPE: NORMAL
MDC_IDC_MSMT_LEADCHNL_LV_IMPEDANCE_VALUE: 399 OHM
MDC_IDC_MSMT_LEADCHNL_LV_IMPEDANCE_VALUE: 4047 OHM
MDC_IDC_MSMT_LEADCHNL_LV_IMPEDANCE_VALUE: 4047 OHM
MDC_IDC_MSMT_LEADCHNL_RA_IMPEDANCE_VALUE: 456 OHM
MDC_IDC_MSMT_LEADCHNL_RA_PACING_THRESHOLD_AMPLITUDE: 1.5 V
MDC_IDC_MSMT_LEADCHNL_RA_PACING_THRESHOLD_PULSEWIDTH: 0.4 MS
MDC_IDC_MSMT_LEADCHNL_RA_SENSING_INTR_AMPL: 1.3 MV
MDC_IDC_MSMT_LEADCHNL_RV_IMPEDANCE_VALUE: 247 OHM
MDC_IDC_MSMT_LEADCHNL_RV_IMPEDANCE_VALUE: 342 OHM
MDC_IDC_MSMT_LEADCHNL_RV_PACING_THRESHOLD_AMPLITUDE: 1 V
MDC_IDC_MSMT_LEADCHNL_RV_PACING_THRESHOLD_PULSEWIDTH: 0.4 MS
MDC_IDC_MSMT_LEADCHNL_RV_SENSING_INTR_AMPL: 5.8 MV
MDC_IDC_PG_IMPLANT_DTM: NORMAL
MDC_IDC_PG_MFG: NORMAL
MDC_IDC_PG_MODEL: NORMAL
MDC_IDC_PG_SERIAL: NORMAL
MDC_IDC_PG_TYPE: NORMAL
MDC_IDC_SESS_CLINIC_NAME: NORMAL
MDC_IDC_SESS_DTM: NORMAL
MDC_IDC_SESS_TYPE: NORMAL
MDC_IDC_SET_BRADY_LOWRATE: 50 {BEATS}/MIN
MDC_IDC_SET_BRADY_MAX_SENSOR_RATE: 130 {BEATS}/MIN
MDC_IDC_SET_BRADY_MODE: NORMAL
MDC_IDC_SET_CRT_PACED_CHAMBERS: NORMAL
MDC_IDC_SET_LEADCHNL_LV_PACING_ANODE_ELECTRODE_1: NORMAL
MDC_IDC_SET_LEADCHNL_LV_PACING_ANODE_LOCATION_1: NORMAL
MDC_IDC_SET_LEADCHNL_LV_PACING_CATHODE_ELECTRODE_1: NORMAL
MDC_IDC_SET_LEADCHNL_LV_PACING_CATHODE_LOCATION_1: NORMAL
MDC_IDC_SET_LEADCHNL_LV_PACING_POLARITY: NORMAL
MDC_IDC_SET_LEADCHNL_RA_PACING_AMPLITUDE: 2.5 V
MDC_IDC_SET_LEADCHNL_RA_PACING_ANODE_ELECTRODE_1: NORMAL
MDC_IDC_SET_LEADCHNL_RA_PACING_ANODE_LOCATION_1: NORMAL
MDC_IDC_SET_LEADCHNL_RA_PACING_CATHODE_ELECTRODE_1: NORMAL
MDC_IDC_SET_LEADCHNL_RA_PACING_CATHODE_LOCATION_1: NORMAL
MDC_IDC_SET_LEADCHNL_RA_PACING_POLARITY: NORMAL
MDC_IDC_SET_LEADCHNL_RA_PACING_PULSEWIDTH: 0.4 MS
MDC_IDC_SET_LEADCHNL_RA_SENSING_ANODE_ELECTRODE_1: NORMAL
MDC_IDC_SET_LEADCHNL_RA_SENSING_ANODE_LOCATION_1: NORMAL
MDC_IDC_SET_LEADCHNL_RA_SENSING_CATHODE_ELECTRODE_1: NORMAL
MDC_IDC_SET_LEADCHNL_RA_SENSING_CATHODE_LOCATION_1: NORMAL
MDC_IDC_SET_LEADCHNL_RA_SENSING_POLARITY: NORMAL
MDC_IDC_SET_LEADCHNL_RA_SENSING_SENSITIVITY: 0.45 MV
MDC_IDC_SET_LEADCHNL_RV_PACING_AMPLITUDE: 2 V
MDC_IDC_SET_LEADCHNL_RV_PACING_ANODE_ELECTRODE_1: NORMAL
MDC_IDC_SET_LEADCHNL_RV_PACING_ANODE_LOCATION_1: NORMAL
MDC_IDC_SET_LEADCHNL_RV_PACING_CATHODE_ELECTRODE_1: NORMAL
MDC_IDC_SET_LEADCHNL_RV_PACING_CATHODE_LOCATION_1: NORMAL
MDC_IDC_SET_LEADCHNL_RV_PACING_POLARITY: NORMAL
MDC_IDC_SET_LEADCHNL_RV_PACING_PULSEWIDTH: 0.4 MS
MDC_IDC_SET_LEADCHNL_RV_SENSING_ANODE_ELECTRODE_1: NORMAL
MDC_IDC_SET_LEADCHNL_RV_SENSING_ANODE_LOCATION_1: NORMAL
MDC_IDC_SET_LEADCHNL_RV_SENSING_CATHODE_ELECTRODE_1: NORMAL
MDC_IDC_SET_LEADCHNL_RV_SENSING_CATHODE_LOCATION_1: NORMAL
MDC_IDC_SET_LEADCHNL_RV_SENSING_POLARITY: NORMAL
MDC_IDC_SET_LEADCHNL_RV_SENSING_SENSITIVITY: 0.3 MV
MDC_IDC_SET_ZONE_DETECTION_BEATS_DENOMINATOR: 40 {BEATS}
MDC_IDC_SET_ZONE_DETECTION_BEATS_NUMERATOR: 30 {BEATS}
MDC_IDC_SET_ZONE_DETECTION_INTERVAL: 240 MS
MDC_IDC_SET_ZONE_DETECTION_INTERVAL: 270 MS
MDC_IDC_SET_ZONE_DETECTION_INTERVAL: 360 MS
MDC_IDC_SET_ZONE_DETECTION_INTERVAL: 400 MS
MDC_IDC_SET_ZONE_DETECTION_INTERVAL: 430 MS
MDC_IDC_SET_ZONE_TYPE: NORMAL
MDC_IDC_STAT_AT_BURDEN_PERCENT: 100 %
MDC_IDC_STAT_AT_DTM_END: NORMAL
MDC_IDC_STAT_AT_DTM_START: NORMAL
MDC_IDC_STAT_BRADY_AP_VP_PERCENT: 1.58 %
MDC_IDC_STAT_BRADY_AP_VS_PERCENT: 0.34 %
MDC_IDC_STAT_BRADY_AS_VP_PERCENT: 58.81 %
MDC_IDC_STAT_BRADY_AS_VS_PERCENT: 39.27 %
MDC_IDC_STAT_BRADY_DTM_END: NORMAL
MDC_IDC_STAT_BRADY_DTM_START: NORMAL
MDC_IDC_STAT_BRADY_RA_PERCENT_PACED: 1.3 %
MDC_IDC_STAT_BRADY_RV_PERCENT_PACED: 63.5 %
MDC_IDC_STAT_CRT_DTM_END: NORMAL
MDC_IDC_STAT_CRT_DTM_START: NORMAL
MDC_IDC_STAT_CRT_LV_PERCENT_PACED: 63.26 %
MDC_IDC_STAT_CRT_PERCENT_PACED: 63.26 %
MDC_IDC_STAT_EPISODE_RECENT_COUNT: 0
MDC_IDC_STAT_EPISODE_RECENT_COUNT: 1
MDC_IDC_STAT_EPISODE_RECENT_COUNT: 14
MDC_IDC_STAT_EPISODE_RECENT_COUNT: 273
MDC_IDC_STAT_EPISODE_RECENT_COUNT_DTM_END: NORMAL
MDC_IDC_STAT_EPISODE_RECENT_COUNT_DTM_START: NORMAL
MDC_IDC_STAT_EPISODE_TOTAL_COUNT: 0
MDC_IDC_STAT_EPISODE_TOTAL_COUNT: 0
MDC_IDC_STAT_EPISODE_TOTAL_COUNT: 16
MDC_IDC_STAT_EPISODE_TOTAL_COUNT: 182
MDC_IDC_STAT_EPISODE_TOTAL_COUNT: 19
MDC_IDC_STAT_EPISODE_TOTAL_COUNT: 2
MDC_IDC_STAT_EPISODE_TOTAL_COUNT: 292
MDC_IDC_STAT_EPISODE_TOTAL_COUNT_DTM_END: NORMAL
MDC_IDC_STAT_EPISODE_TOTAL_COUNT_DTM_START: NORMAL
MDC_IDC_STAT_EPISODE_TYPE: NORMAL
MDC_IDC_STAT_TACHYTHERAPY_ATP_DELIVERED_RECENT: 1
MDC_IDC_STAT_TACHYTHERAPY_ATP_DELIVERED_TOTAL: 14
MDC_IDC_STAT_TACHYTHERAPY_RECENT_DTM_END: NORMAL
MDC_IDC_STAT_TACHYTHERAPY_RECENT_DTM_START: NORMAL
MDC_IDC_STAT_TACHYTHERAPY_SHOCKS_ABORTED_RECENT: 0
MDC_IDC_STAT_TACHYTHERAPY_SHOCKS_ABORTED_TOTAL: 2
MDC_IDC_STAT_TACHYTHERAPY_SHOCKS_DELIVERED_RECENT: 0
MDC_IDC_STAT_TACHYTHERAPY_SHOCKS_DELIVERED_TOTAL: 2
MDC_IDC_STAT_TACHYTHERAPY_TOTAL_DTM_END: NORMAL
MDC_IDC_STAT_TACHYTHERAPY_TOTAL_DTM_START: NORMAL

## 2020-05-15 ENCOUNTER — ANTICOAGULATION THERAPY VISIT (OUTPATIENT)
Dept: ANTICOAGULATION | Facility: CLINIC | Age: 65
End: 2020-05-15

## 2020-05-15 ENCOUNTER — CARE COORDINATION (OUTPATIENT)
Dept: CARDIOLOGY | Facility: CLINIC | Age: 65
End: 2020-05-15

## 2020-05-15 DIAGNOSIS — I50.22 CHRONIC SYSTOLIC CONGESTIVE HEART FAILURE (H): Primary | ICD-10-CM

## 2020-05-15 DIAGNOSIS — I50.22 CHRONIC SYSTOLIC CONGESTIVE HEART FAILURE (H): ICD-10-CM

## 2020-05-15 DIAGNOSIS — Z95.811 LVAD (LEFT VENTRICULAR ASSIST DEVICE) PRESENT (H): ICD-10-CM

## 2020-05-15 DIAGNOSIS — Z79.01 LONG TERM CURRENT USE OF ANTICOAGULANT THERAPY: ICD-10-CM

## 2020-05-15 LAB
ALBUMIN SERPL-MCNC: 3.7 G/DL (ref 3.4–5)
ALP SERPL-CCNC: 82 U/L (ref 40–150)
ALT SERPL W P-5'-P-CCNC: 30 U/L (ref 0–70)
ANION GAP SERPL CALCULATED.3IONS-SCNC: 3 MMOL/L (ref 3–14)
AST SERPL W P-5'-P-CCNC: 23 U/L (ref 0–45)
BILIRUB SERPL-MCNC: 0.9 MG/DL (ref 0.2–1.3)
BUN SERPL-MCNC: 26 MG/DL (ref 7–30)
CALCIUM SERPL-MCNC: 8.4 MG/DL (ref 8.5–10.1)
CHLORIDE SERPL-SCNC: 103 MMOL/L (ref 94–109)
CO2 SERPL-SCNC: 30 MMOL/L (ref 20–32)
CREAT SERPL-MCNC: 1.22 MG/DL (ref 0.66–1.25)
ERYTHROCYTE [DISTWIDTH] IN BLOOD BY AUTOMATED COUNT: 17.2 % (ref 10–15)
GFR SERPL CREATININE-BSD FRML MDRD: 63 ML/MIN/{1.73_M2}
GLUCOSE SERPL-MCNC: 94 MG/DL (ref 70–99)
HCT VFR BLD AUTO: 37.5 % (ref 40–53)
HGB BLD-MCNC: 11.1 G/DL (ref 13.3–17.7)
INR PPP: 1.69 (ref 0.86–1.14)
LDH SERPL L TO P-CCNC: 356 U/L (ref 85–227)
MCH RBC QN AUTO: 24.4 PG (ref 26.5–33)
MCHC RBC AUTO-ENTMCNC: 29.6 G/DL (ref 31.5–36.5)
MCV RBC AUTO: 83 FL (ref 78–100)
PLATELET # BLD AUTO: 238 10E9/L (ref 150–450)
POTASSIUM SERPL-SCNC: 3.9 MMOL/L (ref 3.4–5.3)
PROT SERPL-MCNC: 6.7 G/DL (ref 6.8–8.8)
RBC # BLD AUTO: 4.54 10E12/L (ref 4.4–5.9)
SODIUM SERPL-SCNC: 136 MMOL/L (ref 133–144)
WBC # BLD AUTO: 7.2 10E9/L (ref 4–11)

## 2020-05-15 PROCEDURE — 80053 COMPREHEN METABOLIC PANEL: CPT | Performed by: PHYSICIAN ASSISTANT

## 2020-05-15 PROCEDURE — 85027 COMPLETE CBC AUTOMATED: CPT | Performed by: PHYSICIAN ASSISTANT

## 2020-05-15 PROCEDURE — 85610 PROTHROMBIN TIME: CPT | Performed by: PHYSICIAN ASSISTANT

## 2020-05-15 PROCEDURE — 83615 LACTATE (LD) (LDH) ENZYME: CPT | Performed by: PHYSICIAN ASSISTANT

## 2020-05-15 PROCEDURE — 36415 COLL VENOUS BLD VENIPUNCTURE: CPT | Performed by: PHYSICIAN ASSISTANT

## 2020-05-15 NOTE — PROGRESS NOTES
Addendum 20.  Patient has an appointment scheduled for . [facility]                  ANTICOAGULATION FOLLOW-UP CLINIC VISIT    Patient Name:  Jim Willingham  Date:  5/15/2020  Contact Type:  Telephone    SUBJECTIVE:  Patient Findings     Comments:   Patient is unsure why INR is low today.  Patient denies any missed doses of warfarin or eating increased amounts of greens which have caused his INR to go low in the future.  LVAD protocol is followed today.         Clinical Outcomes     Comments:   Patient is unsure why INR is low today.  Patient denies any missed doses of warfarin or eating increased amounts of greens which have caused his INR to go low in the future.  LVAD protocol is followed today.            OBJECTIVE    Recent labs: (last 7 days)     05/15/20  1230   INR 1.69*       ASSESSMENT / PLAN  No question data found.  Anticoagulation Summary  As of 5/15/2020    INR goal:   2.0-3.0   TTR:   66.4 % (11.7 mo)   INR used for dosin.69! (5/15/2020)   Warfarin maintenance plan:   10 mg (5 mg x 2) every Fri; 7.5 mg (5 mg x 1.5) all other days   Full warfarin instructions:   : 10 mg; : 10 mg; Otherwise 10 mg every Fri; 7.5 mg all other days   Weekly warfarin total:   55 mg   Plan last modified:   Delisa Hillman RN (3/17/2020)   Next INR check:   2020   Priority:   Critical   Target end date:   Indefinite    Indications    LVAD (left ventricular assist device) present (H) [Z95.811]  Long-term (current) use of anticoagulants [Z79.01] [Z79.01]  Chronic systolic congestive heart failure (H) [I50.22]             Anticoagulation Episode Summary     INR check location:       Preferred lab:       Send INR reminders to:   [facility]    Comments:   HIPPA Forms mailed 17  Labs drawn either at [facility] or [facility], See 3/5/20 ADDENDUM, pt. weekly INR >2.0 for Cardioversion  LVAD placed 17   II  ASA 81 mg daily      Anticoagulation Care Providers     Provider Role  Specialty Phone number    UlisesDelisa MD Referring Cardiology [phone]            See the Encounter Report to view Anticoagulation Flowsheet and Dosing Calendar (Go to Encounters tab in chart review, and find the Anticoagulation Therapy Visit)    Spoke with patient.     Maru Alonso RN

## 2020-05-15 NOTE — PROGRESS NOTES
"Called pt to check in following cardioversion on 5/11. Pt reports he feels much better, and feels a \"night and day difference\". He reports significantly less SOB and only 1 episode of weakness since cardioversion. He will get labs drawn today for virtual appt scheduled next week.     "

## 2020-05-18 ENCOUNTER — ANCILLARY PROCEDURE (OUTPATIENT)
Dept: CARDIOLOGY | Facility: CLINIC | Age: 65
End: 2020-05-18
Attending: INTERNAL MEDICINE
Payer: COMMERCIAL

## 2020-05-18 DIAGNOSIS — I42.9 CARDIOMYOPATHY (H): ICD-10-CM

## 2020-05-18 PROCEDURE — 93295 DEV INTERROG REMOTE 1/2/MLT: CPT | Performed by: INTERNAL MEDICINE

## 2020-05-18 PROCEDURE — 93296 REM INTERROG EVL PM/IDS: CPT | Mod: ZF

## 2020-05-19 ENCOUNTER — ANTICOAGULATION THERAPY VISIT (OUTPATIENT)
Dept: ANTICOAGULATION | Facility: CLINIC | Age: 65
End: 2020-05-19

## 2020-05-19 ENCOUNTER — VIRTUAL VISIT (OUTPATIENT)
Dept: CARDIOLOGY | Facility: CLINIC | Age: 65
End: 2020-05-19
Attending: INTERNAL MEDICINE
Payer: COMMERCIAL

## 2020-05-19 VITALS — BODY MASS INDEX: 38.04 KG/M2 | WEIGHT: 251 LBS | HEIGHT: 68 IN

## 2020-05-19 DIAGNOSIS — Z95.811 LVAD (LEFT VENTRICULAR ASSIST DEVICE) PRESENT (H): ICD-10-CM

## 2020-05-19 DIAGNOSIS — Z79.01 LONG TERM CURRENT USE OF ANTICOAGULANT THERAPY: ICD-10-CM

## 2020-05-19 DIAGNOSIS — I50.22 CHRONIC SYSTOLIC CONGESTIVE HEART FAILURE (H): Primary | ICD-10-CM

## 2020-05-19 DIAGNOSIS — I50.22 CHRONIC SYSTOLIC CONGESTIVE HEART FAILURE (H): ICD-10-CM

## 2020-05-19 LAB — INR PPP: 2.13 (ref 0.86–1.14)

## 2020-05-19 PROCEDURE — 85610 PROTHROMBIN TIME: CPT | Performed by: INTERNAL MEDICINE

## 2020-05-19 PROCEDURE — 36415 COLL VENOUS BLD VENIPUNCTURE: CPT | Performed by: INTERNAL MEDICINE

## 2020-05-19 PROCEDURE — 99213 OFFICE O/P EST LOW 20 MIN: CPT | Mod: GT | Performed by: PHYSICIAN ASSISTANT

## 2020-05-19 ASSESSMENT — PAIN SCALES - GENERAL: PAINLEVEL: NO PAIN (0)

## 2020-05-19 ASSESSMENT — MIFFLIN-ST. JEOR: SCORE: 1913.03

## 2020-05-19 NOTE — LETTER
"5/19/2020      RE: Jim Willingham  [address] [facility] [address]       Dear Colleague,    Thank you for the opportunity to participate in the care of your patient, Jim Willingham, at the [facility]. Please see a copy of my visit note below.    Amwell visit.  Duration of video: 15 minutes    HPI:   Jim Willingham is a 64 year old male with a past medical history of NICM (EF 20%) s/p HM II LVAD placement (6/19/17) at  (obesity) Other relavant history includes a. Fib, chronic kidney disease stage III, diabetes mellitus type 2, RV failure, CECILIA, obesity, hypertension, COPD, asthma. He is here for heart failure and LVAD follow up.       Last visit: 5/1/2020 with Dr. Montgomery  Lots of exercise intolerance[facility]    This visit  Since Jan, has been having ongoing problems with a lot of dizziness and exercise intolerance. Recently got a cardioversion and feels a \"night and day\" difference.  He is still having occasional dizziness episodes although not improved from pre-cardioversion.  At that point he was having multiple episodes a day.  Now he is maybe had 1 in a week.  Each episode is also much more mild.    He has gained about 5 pounds over the last month.  He feels that this is stable weight, has been slow and gradual.  He has no shortness breath at rest.  His dyspnea on exertion is greatly improved since the cardioversion, can now walk about half a block to a block.  No orthopnea.  No PND, he wears a CPAP.  No lower extremity edema or abdominal edema.  No palpitations or chest pain.  His appetite is good.  He denies blood in the urine or blood in the stool.  Denies prolonged nosebleeds.  Denies driveline infection symptoms including pain, drainage, erythema.  He has occasional mild headache which is not abnormal for him.  No stroke symptoms.    Per patient: LVAD 9400- Flow 5.4-5.5, PI 5.6, Power 6.0 No LVAD " alarms    Cardiac Medications  ASA 81 mg daily  Coumadin  Bumex 2 mg BID  KCl 20 meq daily  Losartan 12.5 mg BID  Metoprolol 25 mg BID  Aldactone 25 mg BID    PAST MEDICAL HISTORY:  Past Medical History:   Diagnosis Date     Benign essential hypertension 2017     Cardiomyopathy, unspecified (H) 2017     CKD (chronic kidney disease) stage 3, GFR 30-59 ml/min (H) 2017     Depression 2017     Diabetes mellitus (H) 2017     H/O gastric bypass 2017     ICD (implantable cardioverter-defibrillator), biventricular, in situ 2017     LVAD (left ventricular assist device) present (H)      NICM (nonischemic cardiomyopathy) (H)/ EF 20% 2017    ECHO: LVEDd. 7.66 cm, Restrictive pattern , Severe mitral valve regurgitation     CECILIA (obstructive sleep apnea) 2017     Paroxysmal atrial fibrillation (H) 2017     Paroxysmal VT (H) 2017     Uncomplicated asthma      Vitamin B12 deficiency (non anemic) 2017       FAMILY HISTORY:  Family History   Problem Relation Age of Onset     Cerebrovascular Disease Mother      Diabetes Mother      Hypertension Mother      Coronary Artery Disease Father      Diabetes Type 2  Father        SOCIAL HISTORY:  Social History     Socioeconomic History     Marital status:      Spouse name: Not on file     Number of children: Not on file     Years of education: Not on file     Highest education level: Not on file   Occupational History     Not on file   Social Needs     Financial resource strain: Not on file     Food insecurity:     Worry: Not on file     Inability: Not on file     Transportation needs:     Medical: Not on file     Non-medical: Not on file   Tobacco Use     Smoking status: Former Smoker     Last attempt to quit:      Years since quittin.1     Smokeless tobacco: Never Used   Substance and Sexual Activity     Alcohol use: No     Drug use: No     Sexual activity: Yes     Partners: Female   Lifestyle     Physical activity:      Days per week: Not on file     Minutes per session: Not on file     Stress: Not on file   Relationships     Social connections:     Talks on phone: Not on file     Gets together: Not on file     Attends Zoroastrianism service: Not on file     Active member of club or organization: Not on file     Attends meetings of clubs or organizations: Not on file     Relationship status: Not on file     Intimate partner violence:     Fear of current or ex partner: Not on file     Emotionally abused: Not on file     Physically abused: Not on file     Forced sexual activity: Not on file   Other Topics Concern     Parent/sibling w/ CABG, MI or angioplasty before 65F 55M? No   Social History Narrative     Not on file       CURRENT MEDICATIONS:  acetaminophen (TYLENOL) 325 MG tablet, Take 650 mg by mouth every 6 hours as needed for mild pain  albuterol (ALBUTEROL) 108 (90 BASE) MCG/ACT Inhaler, Inhale 2 puffs into the lungs every 4 hours as needed for shortness of breath / dyspnea or wheezing  allopurinol (ZYLOPRIM) 100 MG tablet, Take 1 tablet (100 mg) by mouth daily  aspirin 81 MG chewable tablet, 1 tablet (81 mg) by Oral or Feeding Tube route daily  blood glucose monitoring (ONE TOUCH ULTRA 2) meter device kit, Use to test blood sugar four times daily or as directed.  blood glucose VI test strip, Use to test blood sugar four times daily or as directed.  bumetanide (BUMEX) 1 MG tablet, Take 2 tablets (2 mg) by mouth 2 times daily  citalopram (CELEXA) 20 MG tablet, Take 20 mg by mouth daily  cyanocobalamin (VITAMIN B12) 1000 MCG/ML injection, Inject 1,000 mcg into the muscle every 30 days  fluticasone-vilanterol (BREO ELLIPTA) 200-25 MCG/INH oral inhaler, Inhale 1 puff into the lungs daily  gabapentin (NEURONTIN) 300 MG capsule, Take 1 capsule (300 mg) by mouth twice daily and 2 capsules (600 mg) by mouth at bedtime daily.  insulin lispro (HUMALOG KWIKPEN) 100 UNIT/ML (1 unit dial) pen, Inject 1-7 Units Subcutaneous 4 times  daily  insulin pen needle (32G X 4 MM) 32G X 4 MM miscellaneous, Use four pen needles daily or as directed.  levalbuterol (XOPENEX) 1.25 MG/3ML neb solution, Take 3 mLs (1.25 mg) by nebulization every 6 hours as needed for shortness of breath / dyspnea or wheezing  losartan (COZAAR) 25 MG tablet, Take 12.5 mg by mouth 2 times daily  metFORMIN (GLUCOPHAGE) 500 MG tablet, Take 1 tablet (500 mg) by mouth 2 times daily (with meals)  metoprolol succinate ER (TOPROL-XL) 25 MG 24 hr tablet, Take 1 tablet (25 mg) by mouth 2 times daily  montelukast (SINGULAIR) 10 MG tablet, Take 10 mg by mouth At Bedtime  pantoprazole (PROTONIX) 40 MG EC tablet, Take 1 tablet (40 mg) by mouth every morning  potassium chloride ER (K-TAB/KLOR-CON) 10 MEQ CR tablet, Take 2 tablets (20 mEq) by mouth daily  predniSONE (DELTASONE) 20 MG tablet, Take 1 tablet (20 mg) by mouth daily  spironolactone (ALDACTONE) 25 MG tablet, Take 25 mg by mouth 2 times daily  traMADol (ULTRAM) 50 MG tablet, Take 2 tablets (100 mg) by mouth every 6 hours as needed for moderate pain or breakthrough pain  umeclidinium (INCRUSE ELLIPTA) 62.5 MCG/INH oral inhaler, Inhale 1 puff into the lungs daily  warfarin (COUMADIN) 5 MG tablet, Take 5 - 7.5mg daily or as directed by coumadin clinic.  zinc sulfate (ZINCATE) 220 (50 ZN) MG capsule, Take 220 mg by mouth 2 times daily    No current facility-administered medications on file prior to visit.       ROS:   See HPI    EXAM:  There were no vitals taken for this visit.    Constitutional -relatively well-appearing man, no pallor, energy level is good, no obvious pain.  Posture normal.      Eyes - No redness, anicteric      Respiratory - calm, regular breathing, normal respiratory effort, no cough noted      Skin - no pallor, no rash noted on face      Psychiatric - calm affect, and interacting appropriately    The rest of a comprehensive physical examination is deferred due to PHE (public health emergency) video visit  restrictions      Labs - as reviewed in clinic with patient today:  CBC RESULTS:  Lab Results   Component Value Date    WBC 7.2 05/15/2020    RBC 4.54 05/15/2020    HGB 11.1 (L) 05/15/2020    HCT 37.5 (L) 05/15/2020    MCV 83 05/15/2020    MCH 24.4 (L) 05/15/2020    MCHC 29.6 (L) 05/15/2020    RDW 17.2 (H) 05/15/2020     05/15/2020       CMP RESULTS:  Lab Results   Component Value Date     05/15/2020    POTASSIUM 3.9 05/15/2020    CHLORIDE 103 05/15/2020    CO2 30 05/15/2020    ANIONGAP 3 05/15/2020    GLC 94 05/15/2020    BUN 26 05/15/2020    CR 1.22 05/15/2020    GFRESTIMATED 63 05/15/2020    GFRESTBLACK 73 05/15/2020    QAMAR 8.4 (L) 05/15/2020    BILITOTAL 0.9 05/15/2020    ALBUMIN 3.7 05/15/2020    ALKPHOS 82 05/15/2020    ALT 30 05/15/2020    AST 23 05/15/2020        INR RESULTS:  Lab Results   Component Value Date    INR 2.13 (H) 05/19/2020       Lab Results   Component Value Date    MAG 1.9 05/11/2020     Lab Results   Component Value Date    NTBNPI 4,216 (H) 06/15/2017     Lab Results   Component Value Date    NTBNP 866 (H) 07/31/2019           Diagnostics  2/13/2020 ICD Check  Patient seen in clinic for evaluation and iterative programming of his Medtronic Bi-Ventricular ICD per MD orders. Patient has an appointment to see ROGER Yeboah today.  Normal ICD function.  2 AT/AF episodes recorded since 1/21/20.  AF burden = 100%.  Patient takes Warfarin.  No ventricular arrhythmias recorded.  Intrinsic rhythm = AF, v-sensed @  bpm.  AP = 0.2%.  BVP = 56%.  OptiVol fluid index is elevated above baseline, ongoing since 12/14/19.   Estimated battery longevity to MARVA = 18 months.  Battery voltage = 2.9V.   No short v-v intervals recorded.  Lead trends appear stable. Patient reports that he has been having periodic dizzy spells in the last few weeks, typically when he is walking.  No ventricular arrhythmias recorded.  VT monitor zone reprogrammed from 150 to 140 bpm.  AF alerts programmed  off. Plan for patient to return to clinic on 4/3/20 as scheduled.  GLADYS Nash RN.       Multi lead ICD    1/20/2020 EHCO  Interpretation Summary  Technically difficult study. Patients heart rate is in the 120s-150s during  the study.  HM2 LVAD is present and is funcioning at 9400RPM     Severely (EF 10-15%) reduced left ventricular function is present. Severe left  ventricular dilation is present. LVIDd is 6.6cm LVAD cannula in the apex is  not well visualized. Inflow and outflow velocities could not be obtained.  Aortic valve appears to open minimally with each beat. Septum is probably  midline but again difficult to visualize.  Global right ventricular function is moderately to severely reduced.  IVC diameter >2.1 cm collapsing <50% with sniff suggests a high RA pressure  estimated at 15 mmHg or greater. No pericardial effusion is present.     Compared to prior study on 1/16/20, LVIDd appears slightly larger. Patient is  severely tachycardic now. IVC appears more dilated.    7/24/2019 RHC   Time  Systolic  Diastolic  Mean  A Wave  V Wave  EDP  Max dp/dt  HR    RA Pressures  10:04 AM    10 mmHg     91839 mmHg     35042 mmHg       55 bpm       RV Pressures   9:42 AM        384 mmHg/sec         10:01 AM  25 mmHg         10 mmHg      39 bpm       PA Pressures  10:01 AM  25 mmHg     16 mmHg     19 mmHg         80 bpm       PCW Pressures  10:02 AM    11 mmHg     14843 mmHg     75867 mmHg       68 bpm          Time  TDCO  TDCI  Kee C.O.  Kee C.I.  Kee HR    Cardiac Output Results   9:42 AM  4.63 L/min     2.02 L/min/m2     4.62 L/min     2.01 L/min/m2            _____________________________________________________________________________    Assessment and Plan:   Jim Willingham is a 64 year old male with a past medical history of NICM (EF 20%) s/p HM II LVAD placement (6/19/17) at [facility] (obesity) Other relavant history includes chronic kidney disease stage III, diabetes mellitus type 2, RV failure, CECILIA, obesity,  "hypertension, COPD, asthma. He is here for heart failure and LVAD follow up via video given ongoing COVID 19 outbreak.    #  Chronic systolic heart failure secondary to NICM  Stage D  NYHA Class IIIB  ACEi/ARB: Continue Losartan 12.5 mg BID, may be able to increase now that he is s/p cardioversion  BB: Continue metoprolol 25 mg BID, consider decreasing if still in sinus in 6 weeks (RV failure, still having occasional dizziness episodes)  Aldosterone antagonist: Spironolactone 25 mg   SCD prophylaxis: ICD  Fluid status:  Euvolemic by history- continue bumex 2 mg BID      #  S/P LVAD implant as DT due to obesity. Goal is to loose weight to be eligible for transplant.  Anticoagulation: Warfarin with INR goal of 2-3, below goal today, dosing per coumadin clinic  Antiplatelet: Aspirin 81 mg daily  MAP: Goal 65-85 no reading today given video visit  LDH: Stable, 356  VAD Interrogation today:  NA a telephone visit    # RV failure  - Last RHC wedge was 11, RA was 17.  - Would consider slight BB decrease if still in sinus in 6 weeks on ICD check  - Working towards weight loss so that he can be evaluated/listed for transplant     #  Morbid obesity: BMI >40.  Recommend weight loss with a goal weight of 255.  Weight loss clinic referral has been placed previously.    # A. Fib  New in Jan. Now S/p Cardioversion in 5/2020. Symptoms improved dramatically. In sinus on ICD check today. No a. Fib episodes since cardioversion  - Continue BB and coumadin     # CECILIA: Continue CPAP.       # Chronic kidney disease: Creatinine at baseline today.  Baseline creatinine in the past year 1.2-1.4.       MICHEL Yeboah REBECCA JANE Luther W Maulik is a 64 year old male who is being evaluated via a billable video visit.      The patient has been notified of following:     \"This video visit will be conducted via a call between you and your physician/provider. We have found that certain health care needs can be " "provided without the need for an in-person physical exam.  This service lets us provide the care you need with a video conversation.  If a prescription is necessary we can send it directly to your pharmacy.  If lab work is needed we can place an order for that and you can then stop by our lab to have the test done at a later time.    Video visits are billed at different rates depending on your insurance coverage.  Please reach out to your insurance provider with any questions.    If during the course of the call the physician/provider feels a video visit is not appropriate, you will not be charged for this service.\"    Patient has given verbal consent for Video visit? Yes    How would you like to obtain your AVS? MyChart    Patient would like the video invitation sent by: Text to cell phone: [phone]    Will anyone else be joining your video visit? No           Please do not hesitate to contact me if you have any questions/concerns.     Sincerely,     Didi Butler PA-C    "

## 2020-05-19 NOTE — PROGRESS NOTES
"Jim Willingham is a 64 year old male who is being evaluated via a billable video visit.      The patient has been notified of following:     \"This video visit will be conducted via a call between you and your physician/provider. We have found that certain health care needs can be provided without the need for an in-person physical exam.  This service lets us provide the care you need with a video conversation.  If a prescription is necessary we can send it directly to your pharmacy.  If lab work is needed we can place an order for that and you can then stop by our lab to have the test done at a later time.    Video visits are billed at different rates depending on your insurance coverage.  Please reach out to your insurance provider with any questions.    If during the course of the call the physician/provider feels a video visit is not appropriate, you will not be charged for this service.\"    Patient has given verbal consent for Video visit? Yes    How would you like to obtain your AVS? Mary    Patient would like the video invitation sent by: Text to cell phone: [phone]    Will anyone else be joining your video visit? No                     "

## 2020-05-19 NOTE — PATIENT INSTRUCTIONS
Medication changes:  - Please call us if your weight hits 254 lbs  - No other medication changes    Follow-up  - Remote ICD check in 6 weeks  - LVAD clinic in the 3 months

## 2020-05-19 NOTE — PROGRESS NOTES
"[facility] visit.  Duration of video: 15 minutes    HPI:   Jim Willingham is a 64 year old male with a past medical history of NICM (EF 20%) s/p HM II LVAD placement (6/19/17) at  (obesity) Other relavant history includes a. Fib, chronic kidney disease stage III, diabetes mellitus type 2, RV failure, CECILIA, obesity, hypertension, COPD, asthma. He is here for heart failure and LVAD follow up.       Last visit: 5/1/2020 with Dr. Montgomery  Lots of exercise intolerance. Opted to go [facility] with cardioversion.    This visit  Since Jan, has been having ongoing problems with a lot of dizziness and exercise intolerance. Recently got a cardioversion and feels a \"night and day\" difference.  He is still having occasional dizziness episodes although not improved from pre-cardioversion.  At that point he was having multiple episodes a day.  Now he is maybe had 1 in a week.  Each episode is also much more mild.    He has gained about 5 pounds over the last month.  He feels that this is stable weight, has been slow and gradual.  He has no shortness breath at rest.  His dyspnea on exertion is greatly improved since the cardioversion, can now walk about half a block to a block.  No orthopnea.  No PND, he wears a CPAP.  No lower extremity edema or abdominal edema.  No palpitations or chest pain.  His appetite is good.  He denies blood in the urine or blood in the stool.  Denies prolonged nosebleeds.  Denies driveline infection symptoms including pain, drainage, erythema.  He has occasional mild headache which is not abnormal for him.  No stroke symptoms.    Per patient: LVAD 9400- Flow 5.4-5.5, PI 5.6, Power 6.0 No LVAD alarms    Cardiac Medications  ASA 81 mg daily  Coumadin  Bumex 2 mg BID  KCl 20 meq daily  Losartan 12.5 mg BID  Metoprolol 25 mg BID  Aldactone 25 mg BID    PAST MEDICAL HISTORY:  Past Medical History:   Diagnosis Date     Benign essential hypertension 5/11/2017     Cardiomyopathy, unspecified (H) 5/8/2017     CKD " (chronic kidney disease) stage 3, GFR 30-59 ml/min (H) 2017     Depression 2017     Diabetes mellitus (H) 2017     H/O gastric bypass 2017     ICD (implantable cardioverter-defibrillator), biventricular, in situ 2017     LVAD (left ventricular assist device) present (H)      NICM (nonischemic cardiomyopathy) (H)/ EF 20% 2017    ECHO: LVEDd. 7.66 cm, Restrictive pattern , Severe mitral valve regurgitation     CECILIA (obstructive sleep apnea) 2017     Paroxysmal atrial fibrillation (H) 2017     Paroxysmal VT (H) 2017     Uncomplicated asthma      Vitamin B12 deficiency (non anemic) 2017       FAMILY HISTORY:  Family History   Problem Relation Age of Onset     Cerebrovascular Disease Mother      Diabetes Mother      Hypertension Mother      Coronary Artery Disease Father      Diabetes Type 2  Father        SOCIAL HISTORY:  Social History     Socioeconomic History     Marital status:      Spouse name: Not on file     Number of children: Not on file     Years of education: Not on file     Highest education level: Not on file   Occupational History     Not on file   Social Needs     Financial resource strain: Not on file     Food insecurity:     Worry: Not on file     Inability: Not on file     Transportation needs:     Medical: Not on file     Non-medical: Not on file   Tobacco Use     Smoking status: Former Smoker     Last attempt to quit: 1995     Years since quittin.1     Smokeless tobacco: Never Used   Substance and Sexual Activity     Alcohol use: No     Drug use: No     Sexual activity: Yes     Partners: Female   Lifestyle     Physical activity:     Days per week: Not on file     Minutes per session: Not on file     Stress: Not on file   Relationships     Social connections:     Talks on phone: Not on file     Gets together: Not on file     Attends L[employer] day service: Not on file     Active member of club or organization: Not on file     Attends meetings of  clubs or organizations: Not on file     Relationship status: Not on file     Intimate partner violence:     Fear of current or ex partner: Not on file     Emotionally abused: Not on file     Physically abused: Not on file     Forced sexual activity: Not on file   Other Topics Concern     Parent/sibling w/ CABG, MI or angioplasty before 65F 55M? No   Social History Narrative     Not on file       CURRENT MEDICATIONS:  acetaminophen (TYLENOL) 325 MG tablet, Take 650 mg by mouth every 6 hours as needed for mild pain  albuterol (ALBUTEROL) 108 (90 BASE) MCG/ACT Inhaler, Inhale 2 puffs into the lungs every 4 hours as needed for shortness of breath / dyspnea or wheezing  allopurinol (ZYLOPRIM) 100 MG tablet, Take 1 tablet (100 mg) by mouth daily  aspirin 81 MG chewable tablet, 1 tablet (81 mg) by Oral or Feeding Tube route daily  blood glucose monitoring (ONE TOUCH ULTRA 2) meter device kit, Use to test blood sugar four times daily or as directed.  blood glucose VI test strip, Use to test blood sugar four times daily or as directed.  bumetanide (BUMEX) 1 MG tablet, Take 2 tablets (2 mg) by mouth 2 times daily  citalopram (CELEXA) 20 MG tablet, Take 20 mg by mouth daily  cyanocobalamin (VITAMIN B12) 1000 MCG/ML injection, Inject 1,000 mcg into the muscle every 30 days  fluticasone-vilanterol (BREO ELLIPTA) 200-25 MCG/INH oral inhaler, Inhale 1 puff into the lungs daily  gabapentin (NEURONTIN) 300 MG capsule, Take 1 capsule (300 mg) by mouth twice daily and 2 capsules (600 mg) by mouth at bedtime daily.  insulin lispro (HUMALOG KWIKPEN) 100 UNIT/ML (1 unit dial) pen, Inject 1-7 Units Subcutaneous 4 times daily  insulin pen needle (32G X 4 MM) 32G X 4 MM miscellaneous, Use four pen needles daily or as directed.  levalbuterol (XOPENEX) 1.25 MG/3ML neb solution, Take 3 mLs (1.25 mg) by nebulization every 6 hours as needed for shortness of breath / dyspnea or wheezing  losartan (COZAAR) 25 MG tablet, Take 12.5 mg by mouth 2  times daily  metFORMIN (GLUCOPHAGE) 500 MG tablet, Take 1 tablet (500 mg) by mouth 2 times daily (with meals)  metoprolol succinate ER (TOPROL-XL) 25 MG 24 hr tablet, Take 1 tablet (25 mg) by mouth 2 times daily  montelukast (SINGULAIR) 10 MG tablet, Take 10 mg by mouth At Bedtime  pantoprazole (PROTONIX) 40 MG EC tablet, Take 1 tablet (40 mg) by mouth every morning  potassium chloride ER (K-TAB/KLOR-CON) 10 MEQ CR tablet, Take 2 tablets (20 mEq) by mouth daily  predniSONE (DELTASONE) 20 MG tablet, Take 1 tablet (20 mg) by mouth daily  spironolactone (ALDACTONE) 25 MG tablet, Take 25 mg by mouth 2 times daily  traMADol (ULTRAM) 50 MG tablet, Take 2 tablets (100 mg) by mouth every 6 hours as needed for moderate pain or breakthrough pain  umeclidinium (INCRUSE ELLIPTA) 62.5 MCG/INH oral inhaler, Inhale 1 puff into the lungs daily  warfarin (COUMADIN) 5 MG tablet, Take 5 - 7.5mg daily or as directed by coumadin clinic.  zinc sulfate (ZINCATE) 220 (50 ZN) MG capsule, Take 220 mg by mouth 2 times daily    No current facility-administered medications on file prior to visit.       ROS:   See HPI    EXAM:  There were no vitals taken for this visit.    Constitutional -relatively well-appearing man, no pallor, energy level is good, no obvious pain.  Posture normal.      Eyes - No redness, anicteric      Respiratory - calm, regular breathing, normal respiratory effort, no cough noted      Skin - no pallor, no rash noted on face      Psychiatric - calm affect, and interacting appropriately    The rest of a comprehensive physical examination is deferred due to PHE (public health emergency) video visit restrictions      Labs - as reviewed in clinic with patient today:  CBC RESULTS:  Lab Results   Component Value Date    WBC 7.2 05/15/2020    RBC 4.54 05/15/2020    HGB 11.1 (L) 05/15/2020    HCT 37.5 (L) 05/15/2020    MCV 83 05/15/2020    MCH 24.4 (L) 05/15/2020    MCHC 29.6 (L) 05/15/2020    RDW 17.2 (H) 05/15/2020      05/15/2020       CMP RESULTS:  Lab Results   Component Value Date     05/15/2020    POTASSIUM 3.9 05/15/2020    CHLORIDE 103 05/15/2020    CO2 30 05/15/2020    ANIONGAP 3 05/15/2020    GLC 94 05/15/2020    BUN 26 05/15/2020    CR 1.22 05/15/2020    GFRESTIMATED 63 05/15/2020    GFRESTBLACK 73 05/15/2020    QAMAR 8.4 (L) 05/15/2020    BILITOTAL 0.9 05/15/2020    ALBUMIN 3.7 05/15/2020    ALKPHOS 82 05/15/2020    ALT 30 05/15/2020    AST 23 05/15/2020        INR RESULTS:  Lab Results   Component Value Date    INR 2.13 (H) 05/19/2020       Lab Results   Component Value Date    MAG 1.9 05/11/2020     Lab Results   Component Value Date    NTBNPI 4,216 (H) 06/15/2017     Lab Results   Component Value Date    NTBNP 866 (H) 07/31/2019           Diagnostics  2/13/2020 ICD Check  Patient seen in clinic for evaluation and iterative programming of his Medtronic Bi-Ventricular ICD per MD orders. Patient has an appointment to see ROGER Yeboah today.  Normal ICD function.  2 AT/AF episodes recorded since 1/21/20.  AF burden = 100%.  Patient takes Warfarin.  No ventricular arrhythmias recorded.  Intrinsic rhythm = AF, v-sensed @  bpm.  AP = 0.2%.  BVP = 56%.  OptiVol fluid index is elevated above baseline, ongoing since 12/14/19.   Estimated battery longevity to MARVA = 18 months.  Battery voltage = 2.9V.   No short v-v intervals recorded.  Lead trends appear stable. Patient reports that he has been having periodic dizzy spells in the last few weeks, typically when he is walking.  No ventricular arrhythmias recorded.  VT monitor zone reprogrammed from 150 to 140 bpm.  AF alerts programmed off. Plan for patient to return to clinic on 4/3/20 as scheduled.  GLADYS Nash RN.       Multi lead ICD    1/20/2020 [facility]  Interpretation Summary  Technically difficult study. Patients heart rate is in the 120s-150s during  the study.  HM2 LVAD is present and is funcioning at 9400RPM     Severely (EF 10-15%) reduced left ventricular  function is present. Severe left  ventricular dilation is present. LVIDd is 6.6cm LVAD cannula in the apex is  not well visualized. Inflow and outflow velocities could not be obtained.  Aortic valve appears to open minimally with each beat. Septum is probably  midline but again difficult to visualize.  Global right ventricular function is moderately to severely reduced.  IVC diameter >2.1 cm collapsing <50% with sniff suggests a high RA pressure  estimated at 15 mmHg or greater. No pericardial effusion is present.     Compared to prior study on 1/16/20, LVIDd appears slightly larger. Patient is  severely tachycardic now. IVC appears more dilated.    7/24/2019 [facility]   Time  Systolic  Diastolic  Mean  A Wave  V Wave  EDP  Max dp/dt  HR    RA Pressures  10:04 AM    10 mmHg     44175 mmHg     74552 mmHg       55 bpm       RV Pressures   9:42 AM        384 mmHg/sec         10:01 AM  25 mmHg         10 mmHg      39 bpm       PA Pressures  10:01 AM  25 mmHg     16 mmHg     19 mmHg         80 bpm       PCW Pressures  10:02 AM    11 mmHg     59656 mmHg     12488 mmHg       68 bpm          Time  TDCO  TDCI  Kee C.O.  Kee C.I.  Kee HR    Cardiac Output Results   9:42 AM  4.63 L/min     2.02 L/min/m2     4.62 L/min     2.01 L/min/m2            _____________________________________________________________________________    Assessment and Plan:   Jim Willingham is a 64 year old male with a past medical history of NICM (EF 20%) s/p HM II LVAD placement (6/19/17) at  (obesity) Other relavant history includes chronic kidney disease stage III, diabetes mellitus type 2, RV failure, CECILIA, obesity, hypertension, COPD, asthma. He is here for heart failure and LVAD follow up via video given ongoing COVID 19 outbreak.    #  Chronic systolic heart failure secondary to NICM  Stage D  NYHA Class IIIB  ACEi/ARB: Continue Losartan 12.5 mg BID, may be able to increase now that he is s/p cardioversion  BB: Continue metoprolol 25 mg BID, consider  decreasing if still in sinus in 6 weeks (RV failure, still having occasional dizziness episodes)  Aldosterone antagonist: Spironolactone 25 mg   SCD prophylaxis: ICD  Fluid status:  Euvolemic by history- continue bumex 2 mg BID      #  S/P LVAD implant as DT due to obesity. Goal is to loose weight to be eligible for transplant.  Anticoagulation: Warfarin with INR goal of 2-3, below goal today, dosing per coumadin clinic  Antiplatelet: Aspirin 81 mg daily  MAP: Goal 65-85 no reading today given video visit  LDH: Stable, 356  VAD Interrogation today:  NA a telephone visit    # RV failure  - Last RHC wedge was 11, RA was 17.  - Would consider slight BB decrease if still in sinus in 6 weeks on ICD check  - Working towards weight loss so that he can be evaluated/listed for transplant     #  Morbid obesity: BMI >40.  Recommend weight loss with a goal weight of 255.  Weight loss clinic referral has been placed previously.    # A. Fib  New in Jan. Now S/p Cardioversion in 5/2020. Symptoms improved dramatically. In sinus on ICD check today. No a. Fib episodes since cardioversion  - Continue BB and coumadin     # CECILIA: Continue CPAP.       # Chronic kidney disease: Creatinine at baseline today.  Baseline creatinine in the past year 1.2-1.4.       MICHEL Yeboah REBECCA JANE

## 2020-05-22 DIAGNOSIS — I50.22 CHRONIC SYSTOLIC CONGESTIVE HEART FAILURE (H): Primary | ICD-10-CM

## 2020-05-22 DIAGNOSIS — I47.29 PAROXYSMAL VENTRICULAR TACHYCARDIA (H): ICD-10-CM

## 2020-05-22 DIAGNOSIS — Z95.810 ICD (IMPLANTABLE CARDIOVERTER-DEFIBRILLATOR), BIVENTRICULAR, IN SITU: ICD-10-CM

## 2020-05-26 ENCOUNTER — ANCILLARY PROCEDURE (OUTPATIENT)
Dept: CARDIOLOGY | Facility: CLINIC | Age: 65
End: 2020-05-26
Attending: INTERNAL MEDICINE
Payer: COMMERCIAL

## 2020-05-26 DIAGNOSIS — I42.9 CARDIOMYOPATHY (H): ICD-10-CM

## 2020-05-26 PROCEDURE — 93296 REM INTERROG EVL PM/IDS: CPT | Mod: ZF

## 2020-05-26 PROCEDURE — 93295 DEV INTERROG REMOTE 1/2/MLT: CPT | Performed by: INTERNAL MEDICINE

## 2020-05-28 LAB
MDC_IDC_LEAD_IMPLANT_DT: NORMAL
MDC_IDC_LEAD_LOCATION: NORMAL
MDC_IDC_LEAD_LOCATION_DETAIL_1: NORMAL
MDC_IDC_LEAD_MFG: NORMAL
MDC_IDC_LEAD_MODEL: NORMAL
MDC_IDC_LEAD_POLARITY_TYPE: NORMAL
MDC_IDC_LEAD_SERIAL: NORMAL
MDC_IDC_MSMT_BATTERY_DTM: NORMAL
MDC_IDC_MSMT_BATTERY_REMAINING_LONGEVITY: 12 MO
MDC_IDC_MSMT_BATTERY_RRT_TRIGGER: 2.73
MDC_IDC_MSMT_BATTERY_STATUS: NORMAL
MDC_IDC_MSMT_BATTERY_VOLTAGE: 2.87 V
MDC_IDC_MSMT_CAP_CHARGE_DTM: NORMAL
MDC_IDC_MSMT_CAP_CHARGE_ENERGY: 18 J
MDC_IDC_MSMT_CAP_CHARGE_TIME: 4.52
MDC_IDC_MSMT_CAP_CHARGE_TYPE: NORMAL
MDC_IDC_MSMT_LEADCHNL_LV_IMPEDANCE_VALUE: 4047 OHM
MDC_IDC_MSMT_LEADCHNL_LV_IMPEDANCE_VALUE: 4047 OHM
MDC_IDC_MSMT_LEADCHNL_LV_IMPEDANCE_VALUE: 437 OHM
MDC_IDC_MSMT_LEADCHNL_LV_PACING_THRESHOLD_AMPLITUDE: 0.62 V
MDC_IDC_MSMT_LEADCHNL_LV_PACING_THRESHOLD_PULSEWIDTH: 0.4 MS
MDC_IDC_MSMT_LEADCHNL_RA_IMPEDANCE_VALUE: 456 OHM
MDC_IDC_MSMT_LEADCHNL_RA_PACING_THRESHOLD_AMPLITUDE: 1.25 V
MDC_IDC_MSMT_LEADCHNL_RA_PACING_THRESHOLD_PULSEWIDTH: 0.4 MS
MDC_IDC_MSMT_LEADCHNL_RA_SENSING_INTR_AMPL: 1.38 MV
MDC_IDC_MSMT_LEADCHNL_RA_SENSING_INTR_AMPL: 1.38 MV
MDC_IDC_MSMT_LEADCHNL_RV_IMPEDANCE_VALUE: 247 OHM
MDC_IDC_MSMT_LEADCHNL_RV_IMPEDANCE_VALUE: 342 OHM
MDC_IDC_MSMT_LEADCHNL_RV_PACING_THRESHOLD_AMPLITUDE: 1 V
MDC_IDC_MSMT_LEADCHNL_RV_PACING_THRESHOLD_PULSEWIDTH: 0.4 MS
MDC_IDC_MSMT_LEADCHNL_RV_SENSING_INTR_AMPL: 6.12 MV
MDC_IDC_MSMT_LEADCHNL_RV_SENSING_INTR_AMPL: 6.12 MV
MDC_IDC_PG_IMPLANT_DTM: NORMAL
MDC_IDC_PG_MFG: NORMAL
MDC_IDC_PG_MODEL: NORMAL
MDC_IDC_PG_SERIAL: NORMAL
MDC_IDC_PG_TYPE: NORMAL
MDC_IDC_SESS_CLINIC_NAME: NORMAL
MDC_IDC_SESS_DTM: NORMAL
MDC_IDC_SESS_TYPE: NORMAL
MDC_IDC_SET_BRADY_LOWRATE: 50 {BEATS}/MIN
MDC_IDC_SET_BRADY_MAX_SENSOR_RATE: 130 {BEATS}/MIN
MDC_IDC_SET_BRADY_MODE: NORMAL
MDC_IDC_SET_CRT_PACED_CHAMBERS: NORMAL
MDC_IDC_SET_LEADCHNL_LV_PACING_ANODE_ELECTRODE_1: NORMAL
MDC_IDC_SET_LEADCHNL_LV_PACING_ANODE_LOCATION_1: NORMAL
MDC_IDC_SET_LEADCHNL_LV_PACING_CATHODE_ELECTRODE_1: NORMAL
MDC_IDC_SET_LEADCHNL_LV_PACING_CATHODE_LOCATION_1: NORMAL
MDC_IDC_SET_LEADCHNL_LV_PACING_POLARITY: NORMAL
MDC_IDC_SET_LEADCHNL_RA_PACING_AMPLITUDE: 2.5 V
MDC_IDC_SET_LEADCHNL_RA_PACING_ANODE_ELECTRODE_1: NORMAL
MDC_IDC_SET_LEADCHNL_RA_PACING_ANODE_LOCATION_1: NORMAL
MDC_IDC_SET_LEADCHNL_RA_PACING_CATHODE_ELECTRODE_1: NORMAL
MDC_IDC_SET_LEADCHNL_RA_PACING_CATHODE_LOCATION_1: NORMAL
MDC_IDC_SET_LEADCHNL_RA_PACING_POLARITY: NORMAL
MDC_IDC_SET_LEADCHNL_RA_PACING_PULSEWIDTH: 0.4 MS
MDC_IDC_SET_LEADCHNL_RA_SENSING_ANODE_ELECTRODE_1: NORMAL
MDC_IDC_SET_LEADCHNL_RA_SENSING_ANODE_LOCATION_1: NORMAL
MDC_IDC_SET_LEADCHNL_RA_SENSING_CATHODE_ELECTRODE_1: NORMAL
MDC_IDC_SET_LEADCHNL_RA_SENSING_CATHODE_LOCATION_1: NORMAL
MDC_IDC_SET_LEADCHNL_RA_SENSING_POLARITY: NORMAL
MDC_IDC_SET_LEADCHNL_RA_SENSING_SENSITIVITY: 0.45 MV
MDC_IDC_SET_LEADCHNL_RV_PACING_AMPLITUDE: 2 V
MDC_IDC_SET_LEADCHNL_RV_PACING_ANODE_ELECTRODE_1: NORMAL
MDC_IDC_SET_LEADCHNL_RV_PACING_ANODE_LOCATION_1: NORMAL
MDC_IDC_SET_LEADCHNL_RV_PACING_CATHODE_ELECTRODE_1: NORMAL
MDC_IDC_SET_LEADCHNL_RV_PACING_CATHODE_LOCATION_1: NORMAL
MDC_IDC_SET_LEADCHNL_RV_PACING_POLARITY: NORMAL
MDC_IDC_SET_LEADCHNL_RV_PACING_PULSEWIDTH: 0.4 MS
MDC_IDC_SET_LEADCHNL_RV_SENSING_ANODE_ELECTRODE_1: NORMAL
MDC_IDC_SET_LEADCHNL_RV_SENSING_ANODE_LOCATION_1: NORMAL
MDC_IDC_SET_LEADCHNL_RV_SENSING_CATHODE_ELECTRODE_1: NORMAL
MDC_IDC_SET_LEADCHNL_RV_SENSING_CATHODE_LOCATION_1: NORMAL
MDC_IDC_SET_LEADCHNL_RV_SENSING_POLARITY: NORMAL
MDC_IDC_SET_LEADCHNL_RV_SENSING_SENSITIVITY: 0.3 MV
MDC_IDC_SET_ZONE_DETECTION_BEATS_DENOMINATOR: 40 {BEATS}
MDC_IDC_SET_ZONE_DETECTION_BEATS_NUMERATOR: 30 {BEATS}
MDC_IDC_SET_ZONE_DETECTION_INTERVAL: 240 MS
MDC_IDC_SET_ZONE_DETECTION_INTERVAL: 270 MS
MDC_IDC_SET_ZONE_DETECTION_INTERVAL: 360 MS
MDC_IDC_SET_ZONE_DETECTION_INTERVAL: 400 MS
MDC_IDC_SET_ZONE_DETECTION_INTERVAL: 430 MS
MDC_IDC_SET_ZONE_TYPE: NORMAL
MDC_IDC_STAT_AT_BURDEN_PERCENT: 0 %
MDC_IDC_STAT_AT_DTM_END: NORMAL
MDC_IDC_STAT_AT_DTM_START: NORMAL
MDC_IDC_STAT_BRADY_AP_VP_PERCENT: 0 %
MDC_IDC_STAT_BRADY_AP_VS_PERCENT: 0.25 %
MDC_IDC_STAT_BRADY_AS_VP_PERCENT: 0 %
MDC_IDC_STAT_BRADY_AS_VS_PERCENT: 99.75 %
MDC_IDC_STAT_BRADY_DTM_END: NORMAL
MDC_IDC_STAT_BRADY_DTM_START: NORMAL
MDC_IDC_STAT_BRADY_RA_PERCENT_PACED: 0.34 %
MDC_IDC_STAT_BRADY_RV_PERCENT_PACED: 0 %
MDC_IDC_STAT_CRT_DTM_END: NORMAL
MDC_IDC_STAT_CRT_DTM_START: NORMAL
MDC_IDC_STAT_CRT_LV_PERCENT_PACED: 0 %
MDC_IDC_STAT_CRT_PERCENT_PACED: 0 %
MDC_IDC_STAT_EPISODE_RECENT_COUNT: 0
MDC_IDC_STAT_EPISODE_RECENT_COUNT_DTM_END: NORMAL
MDC_IDC_STAT_EPISODE_RECENT_COUNT_DTM_START: NORMAL
MDC_IDC_STAT_EPISODE_TOTAL_COUNT: 0
MDC_IDC_STAT_EPISODE_TOTAL_COUNT: 0
MDC_IDC_STAT_EPISODE_TOTAL_COUNT: 16
MDC_IDC_STAT_EPISODE_TOTAL_COUNT: 182
MDC_IDC_STAT_EPISODE_TOTAL_COUNT: 19
MDC_IDC_STAT_EPISODE_TOTAL_COUNT: 2
MDC_IDC_STAT_EPISODE_TOTAL_COUNT: 292
MDC_IDC_STAT_EPISODE_TOTAL_COUNT_DTM_END: NORMAL
MDC_IDC_STAT_EPISODE_TOTAL_COUNT_DTM_START: NORMAL
MDC_IDC_STAT_EPISODE_TYPE: NORMAL
MDC_IDC_STAT_TACHYTHERAPY_ATP_DELIVERED_RECENT: 0
MDC_IDC_STAT_TACHYTHERAPY_ATP_DELIVERED_TOTAL: 14
MDC_IDC_STAT_TACHYTHERAPY_RECENT_DTM_END: NORMAL
MDC_IDC_STAT_TACHYTHERAPY_RECENT_DTM_START: NORMAL
MDC_IDC_STAT_TACHYTHERAPY_SHOCKS_ABORTED_RECENT: 0
MDC_IDC_STAT_TACHYTHERAPY_SHOCKS_ABORTED_TOTAL: 2
MDC_IDC_STAT_TACHYTHERAPY_SHOCKS_DELIVERED_RECENT: 0
MDC_IDC_STAT_TACHYTHERAPY_SHOCKS_DELIVERED_TOTAL: 2
MDC_IDC_STAT_TACHYTHERAPY_TOTAL_DTM_END: NORMAL
MDC_IDC_STAT_TACHYTHERAPY_TOTAL_DTM_START: NORMAL

## 2020-06-01 ENCOUNTER — ANTICOAGULATION THERAPY VISIT (OUTPATIENT)
Dept: ANTICOAGULATION | Facility: CLINIC | Age: 65
End: 2020-06-01

## 2020-06-01 DIAGNOSIS — I50.22 CHRONIC SYSTOLIC CONGESTIVE HEART FAILURE (H): ICD-10-CM

## 2020-06-01 DIAGNOSIS — Z79.01 LONG TERM CURRENT USE OF ANTICOAGULANT THERAPY: ICD-10-CM

## 2020-06-01 DIAGNOSIS — Z95.811 LVAD (LEFT VENTRICULAR ASSIST DEVICE) PRESENT (H): ICD-10-CM

## 2020-06-01 LAB — INR PPP: 1.92 (ref 0.86–1.14)

## 2020-06-01 PROCEDURE — 36415 COLL VENOUS BLD VENIPUNCTURE: CPT | Performed by: INTERNAL MEDICINE

## 2020-06-01 PROCEDURE — 85610 PROTHROMBIN TIME: CPT | Performed by: INTERNAL MEDICINE

## 2020-06-01 NOTE — PROGRESS NOTES
ANTICOAGULATION FOLLOW-UP CLINIC VISIT    Patient Name:  Jim Willingham  Date:  2020  Contact Type:  Telephone    SUBJECTIVE:         OBJECTIVE    Recent labs: (last 7 days)     20  1507   INR 1.92*       ASSESSMENT / PLAN  No question data found.  Anticoagulation Summary  As of 2020    INR goal:   2.0-3.0   TTR:   64.1 % (11.7 mo)   INR used for dosin.92! (2020)   Warfarin maintenance plan:   10 mg (5 mg x 2) every Fri; 7.5 mg (5 mg x 1.5) all other days   Full warfarin instructions:   : 10 mg; Otherwise 10 mg every Fri; 7.5 mg all other days   Weekly warfarin total:   55 mg   Plan last modified:   Delisa Hillman RN (3/17/2020)   Next INR check:   2020   Priority:   Critical   Target end date:   Indefinite    Indications    LVAD (left ventricular assist device) present (H) [Z95.811]  Long-term (current) use of anticoagulants [Z79.01] [Z79.01]  Chronic systolic congestive heart failure (H) [I50.22]             Anticoagulation Episode Summary     INR check location:       Preferred lab:       Send INR reminders to:   [facility]    Comments:   HIPPA Forms mailed 17  Labs drawn either at [facility] or [facility], See 3/5/20 ADDENDUM, pt. weekly INR >2.0 for Cardioversion  LVAD placed 17   II  ASA 81 mg daily      Anticoagulation Care Providers     Provider Role Specialty Phone number    Delisa Montgomery MD Referring Cardiology [phone]            See the Encounter Report to view Anticoagulation Flowsheet and Dosing Calendar (Go to Encounters tab in chart review, and find the Anticoagulation Therapy Visit)    Spoke with patient.     Maru Alonso RN

## 2020-06-08 ENCOUNTER — TELEPHONE (OUTPATIENT)
Dept: ANTICOAGULATION | Facility: CLINIC | Age: 65
End: 2020-06-08

## 2020-06-08 DIAGNOSIS — I50.22 CHRONIC SYSTOLIC CONGESTIVE HEART FAILURE (H): ICD-10-CM

## 2020-06-08 DIAGNOSIS — Z95.811 LVAD (LEFT VENTRICULAR ASSIST DEVICE) PRESENT (H): ICD-10-CM

## 2020-06-08 DIAGNOSIS — Z79.01 LONG TERM CURRENT USE OF ANTICOAGULANT THERAPY: ICD-10-CM

## 2020-06-08 NOTE — TELEPHONE ENCOUNTER
Left message for Jim reminding him he is due for an INR.  Asked that he call the [facility] or have INR done as soon as he can.  Kirsty Bermudez RN

## 2020-06-10 ENCOUNTER — ANTICOAGULATION THERAPY VISIT (OUTPATIENT)
Dept: ANTICOAGULATION | Facility: CLINIC | Age: 65
End: 2020-06-10

## 2020-06-10 DIAGNOSIS — Z95.811 LVAD (LEFT VENTRICULAR ASSIST DEVICE) PRESENT (H): ICD-10-CM

## 2020-06-10 DIAGNOSIS — Z79.01 LONG TERM CURRENT USE OF ANTICOAGULANT THERAPY: ICD-10-CM

## 2020-06-10 DIAGNOSIS — I50.22 CHRONIC SYSTOLIC CONGESTIVE HEART FAILURE (H): ICD-10-CM

## 2020-06-10 LAB
INR PPP: 1.96 (ref 0.86–1.14)
MDC_IDC_LEAD_IMPLANT_DT: NORMAL
MDC_IDC_LEAD_LOCATION: NORMAL
MDC_IDC_LEAD_LOCATION_DETAIL_1: NORMAL
MDC_IDC_LEAD_MFG: NORMAL
MDC_IDC_LEAD_MODEL: NORMAL
MDC_IDC_LEAD_POLARITY_TYPE: NORMAL
MDC_IDC_LEAD_SERIAL: NORMAL
MDC_IDC_MSMT_BATTERY_DTM: NORMAL
MDC_IDC_MSMT_BATTERY_REMAINING_LONGEVITY: 11 MO
MDC_IDC_MSMT_BATTERY_RRT_TRIGGER: 2.73
MDC_IDC_MSMT_BATTERY_STATUS: NORMAL
MDC_IDC_MSMT_BATTERY_VOLTAGE: 2.89 V
MDC_IDC_MSMT_CAP_CHARGE_DTM: NORMAL
MDC_IDC_MSMT_CAP_CHARGE_ENERGY: 18 J
MDC_IDC_MSMT_CAP_CHARGE_TIME: 4.28
MDC_IDC_MSMT_CAP_CHARGE_TYPE: NORMAL
MDC_IDC_MSMT_LEADCHNL_LV_IMPEDANCE_VALUE: 4047 OHM
MDC_IDC_MSMT_LEADCHNL_LV_IMPEDANCE_VALUE: 4047 OHM
MDC_IDC_MSMT_LEADCHNL_LV_IMPEDANCE_VALUE: 437 OHM
MDC_IDC_MSMT_LEADCHNL_LV_PACING_THRESHOLD_AMPLITUDE: 0.62 V
MDC_IDC_MSMT_LEADCHNL_LV_PACING_THRESHOLD_PULSEWIDTH: 0.4 MS
MDC_IDC_MSMT_LEADCHNL_RA_IMPEDANCE_VALUE: 456 OHM
MDC_IDC_MSMT_LEADCHNL_RA_PACING_THRESHOLD_AMPLITUDE: 1.25 V
MDC_IDC_MSMT_LEADCHNL_RA_PACING_THRESHOLD_PULSEWIDTH: 0.4 MS
MDC_IDC_MSMT_LEADCHNL_RA_SENSING_INTR_AMPL: 0.88 MV
MDC_IDC_MSMT_LEADCHNL_RA_SENSING_INTR_AMPL: 0.88 MV
MDC_IDC_MSMT_LEADCHNL_RV_IMPEDANCE_VALUE: 247 OHM
MDC_IDC_MSMT_LEADCHNL_RV_IMPEDANCE_VALUE: 323 OHM
MDC_IDC_MSMT_LEADCHNL_RV_PACING_THRESHOLD_AMPLITUDE: 1 V
MDC_IDC_MSMT_LEADCHNL_RV_PACING_THRESHOLD_PULSEWIDTH: 0.4 MS
MDC_IDC_MSMT_LEADCHNL_RV_SENSING_INTR_AMPL: 5.75 MV
MDC_IDC_MSMT_LEADCHNL_RV_SENSING_INTR_AMPL: 5.75 MV
MDC_IDC_PG_IMPLANT_DTM: NORMAL
MDC_IDC_PG_MFG: NORMAL
MDC_IDC_PG_MODEL: NORMAL
MDC_IDC_PG_SERIAL: NORMAL
MDC_IDC_PG_TYPE: NORMAL
MDC_IDC_SESS_CLINIC_NAME: NORMAL
MDC_IDC_SESS_DTM: NORMAL
MDC_IDC_SESS_TYPE: NORMAL
MDC_IDC_SET_BRADY_LOWRATE: 50 {BEATS}/MIN
MDC_IDC_SET_BRADY_MAX_SENSOR_RATE: 130 {BEATS}/MIN
MDC_IDC_SET_BRADY_MODE: NORMAL
MDC_IDC_SET_CRT_PACED_CHAMBERS: NORMAL
MDC_IDC_SET_LEADCHNL_LV_PACING_ANODE_ELECTRODE_1: NORMAL
MDC_IDC_SET_LEADCHNL_LV_PACING_ANODE_LOCATION_1: NORMAL
MDC_IDC_SET_LEADCHNL_LV_PACING_CATHODE_ELECTRODE_1: NORMAL
MDC_IDC_SET_LEADCHNL_LV_PACING_CATHODE_LOCATION_1: NORMAL
MDC_IDC_SET_LEADCHNL_LV_PACING_POLARITY: NORMAL
MDC_IDC_SET_LEADCHNL_RA_PACING_AMPLITUDE: 2.5 V
MDC_IDC_SET_LEADCHNL_RA_PACING_ANODE_ELECTRODE_1: NORMAL
MDC_IDC_SET_LEADCHNL_RA_PACING_ANODE_LOCATION_1: NORMAL
MDC_IDC_SET_LEADCHNL_RA_PACING_CATHODE_ELECTRODE_1: NORMAL
MDC_IDC_SET_LEADCHNL_RA_PACING_CATHODE_LOCATION_1: NORMAL
MDC_IDC_SET_LEADCHNL_RA_PACING_POLARITY: NORMAL
MDC_IDC_SET_LEADCHNL_RA_PACING_PULSEWIDTH: 0.4 MS
MDC_IDC_SET_LEADCHNL_RA_SENSING_ANODE_ELECTRODE_1: NORMAL
MDC_IDC_SET_LEADCHNL_RA_SENSING_ANODE_LOCATION_1: NORMAL
MDC_IDC_SET_LEADCHNL_RA_SENSING_CATHODE_ELECTRODE_1: NORMAL
MDC_IDC_SET_LEADCHNL_RA_SENSING_CATHODE_LOCATION_1: NORMAL
MDC_IDC_SET_LEADCHNL_RA_SENSING_POLARITY: NORMAL
MDC_IDC_SET_LEADCHNL_RA_SENSING_SENSITIVITY: 0.45 MV
MDC_IDC_SET_LEADCHNL_RV_PACING_AMPLITUDE: 2 V
MDC_IDC_SET_LEADCHNL_RV_PACING_ANODE_ELECTRODE_1: NORMAL
MDC_IDC_SET_LEADCHNL_RV_PACING_ANODE_LOCATION_1: NORMAL
MDC_IDC_SET_LEADCHNL_RV_PACING_CATHODE_ELECTRODE_1: NORMAL
MDC_IDC_SET_LEADCHNL_RV_PACING_CATHODE_LOCATION_1: NORMAL
MDC_IDC_SET_LEADCHNL_RV_PACING_POLARITY: NORMAL
MDC_IDC_SET_LEADCHNL_RV_PACING_PULSEWIDTH: 0.4 MS
MDC_IDC_SET_LEADCHNL_RV_SENSING_ANODE_ELECTRODE_1: NORMAL
MDC_IDC_SET_LEADCHNL_RV_SENSING_ANODE_LOCATION_1: NORMAL
MDC_IDC_SET_LEADCHNL_RV_SENSING_CATHODE_ELECTRODE_1: NORMAL
MDC_IDC_SET_LEADCHNL_RV_SENSING_CATHODE_LOCATION_1: NORMAL
MDC_IDC_SET_LEADCHNL_RV_SENSING_POLARITY: NORMAL
MDC_IDC_SET_LEADCHNL_RV_SENSING_SENSITIVITY: 0.3 MV
MDC_IDC_SET_ZONE_DETECTION_BEATS_DENOMINATOR: 40 {BEATS}
MDC_IDC_SET_ZONE_DETECTION_BEATS_NUMERATOR: 30 {BEATS}
MDC_IDC_SET_ZONE_DETECTION_INTERVAL: 240 MS
MDC_IDC_SET_ZONE_DETECTION_INTERVAL: 270 MS
MDC_IDC_SET_ZONE_DETECTION_INTERVAL: 360 MS
MDC_IDC_SET_ZONE_DETECTION_INTERVAL: 400 MS
MDC_IDC_SET_ZONE_DETECTION_INTERVAL: 430 MS
MDC_IDC_SET_ZONE_TYPE: NORMAL
MDC_IDC_STAT_AT_BURDEN_PERCENT: 0.1 %
MDC_IDC_STAT_AT_DTM_END: NORMAL
MDC_IDC_STAT_AT_DTM_START: NORMAL
MDC_IDC_STAT_BRADY_AP_VP_PERCENT: 0.11 %
MDC_IDC_STAT_BRADY_AP_VS_PERCENT: 0.74 %
MDC_IDC_STAT_BRADY_AS_VP_PERCENT: 0.11 %
MDC_IDC_STAT_BRADY_AS_VS_PERCENT: 99.04 %
MDC_IDC_STAT_BRADY_DTM_END: NORMAL
MDC_IDC_STAT_BRADY_DTM_START: NORMAL
MDC_IDC_STAT_BRADY_RA_PERCENT_PACED: 0.93 %
MDC_IDC_STAT_BRADY_RV_PERCENT_PACED: 0.17 %
MDC_IDC_STAT_CRT_DTM_END: NORMAL
MDC_IDC_STAT_CRT_DTM_START: NORMAL
MDC_IDC_STAT_CRT_LV_PERCENT_PACED: 0.2 %
MDC_IDC_STAT_CRT_PERCENT_PACED: 0.2 %
MDC_IDC_STAT_EPISODE_RECENT_COUNT: 0
MDC_IDC_STAT_EPISODE_RECENT_COUNT_DTM_END: NORMAL
MDC_IDC_STAT_EPISODE_RECENT_COUNT_DTM_START: NORMAL
MDC_IDC_STAT_EPISODE_TOTAL_COUNT: 0
MDC_IDC_STAT_EPISODE_TOTAL_COUNT: 0
MDC_IDC_STAT_EPISODE_TOTAL_COUNT: 16
MDC_IDC_STAT_EPISODE_TOTAL_COUNT: 182
MDC_IDC_STAT_EPISODE_TOTAL_COUNT: 19
MDC_IDC_STAT_EPISODE_TOTAL_COUNT: 2
MDC_IDC_STAT_EPISODE_TOTAL_COUNT: 292
MDC_IDC_STAT_EPISODE_TOTAL_COUNT_DTM_END: NORMAL
MDC_IDC_STAT_EPISODE_TOTAL_COUNT_DTM_START: NORMAL
MDC_IDC_STAT_EPISODE_TYPE: NORMAL
MDC_IDC_STAT_TACHYTHERAPY_ATP_DELIVERED_RECENT: 0
MDC_IDC_STAT_TACHYTHERAPY_ATP_DELIVERED_TOTAL: 14
MDC_IDC_STAT_TACHYTHERAPY_RECENT_DTM_END: NORMAL
MDC_IDC_STAT_TACHYTHERAPY_RECENT_DTM_START: NORMAL
MDC_IDC_STAT_TACHYTHERAPY_SHOCKS_ABORTED_RECENT: 0
MDC_IDC_STAT_TACHYTHERAPY_SHOCKS_ABORTED_TOTAL: 2
MDC_IDC_STAT_TACHYTHERAPY_SHOCKS_DELIVERED_RECENT: 0
MDC_IDC_STAT_TACHYTHERAPY_SHOCKS_DELIVERED_TOTAL: 2
MDC_IDC_STAT_TACHYTHERAPY_TOTAL_DTM_END: NORMAL
MDC_IDC_STAT_TACHYTHERAPY_TOTAL_DTM_START: NORMAL

## 2020-06-10 PROCEDURE — 36415 COLL VENOUS BLD VENIPUNCTURE: CPT | Performed by: INTERNAL MEDICINE

## 2020-06-10 PROCEDURE — 85610 PROTHROMBIN TIME: CPT | Performed by: INTERNAL MEDICINE

## 2020-06-10 NOTE — PROGRESS NOTES
ANTICOAGULATION FOLLOW-UP CLINIC VISIT    Patient Name:  Jim Willingham  Date:  6/10/2020  Contact Type:  Telephone    SUBJECTIVE:         OBJECTIVE    Recent labs: (last 7 days)     06/10/20  0703   INR 1.96*       ASSESSMENT / PLAN  No question data found.  Anticoagulation Summary  As of 6/10/2020    INR goal:   2.0-3.0   TTR:   61.6 % (11.7 mo)   INR used for dosin.96! (6/10/2020)   Warfarin maintenance plan:   10 mg (5 mg x 2) every Wed, Fri; 7.5 mg (5 mg x 1.5) all other days   Full warfarin instructions:   10 mg every Wed, Fri; 7.5 mg all other days   Weekly warfarin total:   57.5 mg   Plan last modified:   Maru Alonso RN (6/10/2020)   Next INR check:   2020   Priority:   Critical   Target end date:   Indefinite    Indications    LVAD (left ventricular assist device) present (H) [Z95.811]  Long-term (current) use of anticoagulants [Z79.01] [Z79.01]  Chronic systolic congestive heart failure (H) [I50.22]             Anticoagulation Episode Summary     INR check location:       Preferred lab:       Send INR reminders to:   [facility]    Comments:   HIPPA Forms mailed 17  Labs drawn either at [facility] or [facility], See 3/5/20 ADDENDUM, pt. weekly INR >2.0 for Cardioversion  LVAD placed 17   II  ASA 81 mg daily      Anticoagulation Care Providers     Provider Role Specialty Phone number    Delisa Montgomery MD Referring Cardiology [phone]            See the Encounter Report to view Anticoagulation Flowsheet and Dosing Calendar (Go to Encounters tab in chart review, and find the Anticoagulation Therapy Visit)    Spoke with patient.     Maru Alonso RN

## 2020-06-11 ENCOUNTER — CARE COORDINATION (OUTPATIENT)
Dept: CARDIOLOGY | Facility: CLINIC | Age: 65
End: 2020-06-11

## 2020-06-11 ENCOUNTER — ANCILLARY PROCEDURE (OUTPATIENT)
Dept: CARDIOLOGY | Facility: CLINIC | Age: 65
End: 2020-06-11
Attending: INTERNAL MEDICINE
Payer: COMMERCIAL

## 2020-06-11 DIAGNOSIS — I47.20 VENTRICULAR TACHYCARDIA (H): ICD-10-CM

## 2020-06-11 PROCEDURE — 99207 CARDIAC DEVICE CHECK - REMOTE: CPT | Performed by: INTERNAL MEDICINE

## 2020-06-12 ENCOUNTER — CARE COORDINATION (OUTPATIENT)
Dept: CARDIOLOGY | Facility: CLINIC | Age: 65
End: 2020-06-12

## 2020-06-12 DIAGNOSIS — I50.22 CHRONIC SYSTOLIC CONGESTIVE HEART FAILURE (H): Primary | ICD-10-CM

## 2020-06-12 NOTE — PROGRESS NOTES
[place] called the VAD coordinator on-call after getting a single ICD shock. He said he was sitting quietly on the couch with his grand-daughter when he was shocked. He felt fine before and after. I asked him to send in an ICD transmission. His VAD numbers were 9400 / 6.1 flow / 5.8 PI / 6.2 pwr.    Device shocked appropriately for VT run - see device note.  Jim instructed to call the device nurse / LVAD coordinator with any more shocks.    Plan to check in with the patient Friday.

## 2020-06-12 NOTE — PROGRESS NOTES
Called pt today to follow-up on ICD shock from last night. Pt feels fine. Denies any dizziness, palpitations or other symptoms. Discussed checking labs, pt reports he won't be able to get an appt until Monday. He will page VAD coordinator if he experiences any symptoms and page the Device RN if he gets any further shocks.

## 2020-06-16 ENCOUNTER — ANTICOAGULATION THERAPY VISIT (OUTPATIENT)
Dept: ANTICOAGULATION | Facility: CLINIC | Age: 65
End: 2020-06-16

## 2020-06-16 DIAGNOSIS — Z79.01 LONG TERM CURRENT USE OF ANTICOAGULANT THERAPY: ICD-10-CM

## 2020-06-16 DIAGNOSIS — Z95.811 LVAD (LEFT VENTRICULAR ASSIST DEVICE) PRESENT (H): ICD-10-CM

## 2020-06-16 DIAGNOSIS — I50.22 CHRONIC SYSTOLIC CONGESTIVE HEART FAILURE (H): ICD-10-CM

## 2020-06-16 LAB
ANION GAP SERPL CALCULATED.3IONS-SCNC: 7 MMOL/L (ref 3–14)
BUN SERPL-MCNC: 31 MG/DL (ref 7–30)
CALCIUM SERPL-MCNC: 8.9 MG/DL (ref 8.5–10.1)
CHLORIDE SERPL-SCNC: 105 MMOL/L (ref 94–109)
CO2 SERPL-SCNC: 27 MMOL/L (ref 20–32)
CREAT SERPL-MCNC: 1.28 MG/DL (ref 0.66–1.25)
GFR SERPL CREATININE-BSD FRML MDRD: 59 ML/MIN/{1.73_M2}
GLUCOSE SERPL-MCNC: 124 MG/DL (ref 70–99)
INR PPP: 2.28 (ref 0.86–1.14)
MDC_IDC_EPISODE_DTM: NORMAL
MDC_IDC_EPISODE_DURATION: 0 S
MDC_IDC_EPISODE_DURATION: 1 S
MDC_IDC_EPISODE_DURATION: 2 S
MDC_IDC_EPISODE_DURATION: 21 S
MDC_IDC_EPISODE_DURATION: 8 S
MDC_IDC_EPISODE_ID: 706
MDC_IDC_EPISODE_ID: 707
MDC_IDC_EPISODE_ID: 708
MDC_IDC_EPISODE_ID: 709
MDC_IDC_EPISODE_ID: 710
MDC_IDC_EPISODE_ID: NORMAL
MDC_IDC_EPISODE_TYPE: NORMAL
MDC_IDC_LEAD_IMPLANT_DT: NORMAL
MDC_IDC_LEAD_LOCATION: NORMAL
MDC_IDC_LEAD_LOCATION_DETAIL_1: NORMAL
MDC_IDC_LEAD_MFG: NORMAL
MDC_IDC_LEAD_MODEL: NORMAL
MDC_IDC_LEAD_POLARITY_TYPE: NORMAL
MDC_IDC_LEAD_SERIAL: NORMAL
MDC_IDC_MSMT_BATTERY_DTM: NORMAL
MDC_IDC_MSMT_BATTERY_REMAINING_LONGEVITY: 11 MO
MDC_IDC_MSMT_BATTERY_RRT_TRIGGER: 2.73
MDC_IDC_MSMT_BATTERY_STATUS: NORMAL
MDC_IDC_MSMT_BATTERY_VOLTAGE: 2.78 V
MDC_IDC_MSMT_CAP_CHARGE_DTM: NORMAL
MDC_IDC_MSMT_CAP_CHARGE_DTM: NORMAL
MDC_IDC_MSMT_CAP_CHARGE_ENERGY: 18 J
MDC_IDC_MSMT_CAP_CHARGE_ENERGY: 35 J
MDC_IDC_MSMT_CAP_CHARGE_TIME: 10.66 S
MDC_IDC_MSMT_CAP_CHARGE_TIME: 4.52
MDC_IDC_MSMT_CAP_CHARGE_TYPE: NORMAL
MDC_IDC_MSMT_CAP_CHARGE_TYPE: NORMAL
MDC_IDC_MSMT_LEADCHNL_LV_IMPEDANCE_VALUE: 4047 OHM
MDC_IDC_MSMT_LEADCHNL_LV_IMPEDANCE_VALUE: 4047 OHM
MDC_IDC_MSMT_LEADCHNL_LV_IMPEDANCE_VALUE: 437 OHM
MDC_IDC_MSMT_LEADCHNL_RA_IMPEDANCE_VALUE: 456 OHM
MDC_IDC_MSMT_LEADCHNL_RA_SENSING_INTR_AMPL: 1.25 MV
MDC_IDC_MSMT_LEADCHNL_RA_SENSING_INTR_AMPL: 1.25 MV
MDC_IDC_MSMT_LEADCHNL_RV_IMPEDANCE_VALUE: 247 OHM
MDC_IDC_MSMT_LEADCHNL_RV_IMPEDANCE_VALUE: 342 OHM
MDC_IDC_MSMT_LEADCHNL_RV_SENSING_INTR_AMPL: 6.25 MV
MDC_IDC_MSMT_LEADCHNL_RV_SENSING_INTR_AMPL: 6.25 MV
MDC_IDC_PG_IMPLANT_DTM: NORMAL
MDC_IDC_PG_MFG: NORMAL
MDC_IDC_PG_MODEL: NORMAL
MDC_IDC_PG_SERIAL: NORMAL
MDC_IDC_PG_TYPE: NORMAL
MDC_IDC_SESS_CLINIC_NAME: NORMAL
MDC_IDC_SESS_DTM: NORMAL
MDC_IDC_SESS_TYPE: NORMAL
MDC_IDC_SET_BRADY_LOWRATE: 50 {BEATS}/MIN
MDC_IDC_SET_BRADY_MAX_SENSOR_RATE: 130 {BEATS}/MIN
MDC_IDC_SET_BRADY_MODE: NORMAL
MDC_IDC_SET_CRT_PACED_CHAMBERS: NORMAL
MDC_IDC_SET_LEADCHNL_LV_PACING_ANODE_ELECTRODE_1: NORMAL
MDC_IDC_SET_LEADCHNL_LV_PACING_ANODE_LOCATION_1: NORMAL
MDC_IDC_SET_LEADCHNL_LV_PACING_CATHODE_ELECTRODE_1: NORMAL
MDC_IDC_SET_LEADCHNL_LV_PACING_CATHODE_LOCATION_1: NORMAL
MDC_IDC_SET_LEADCHNL_LV_PACING_POLARITY: NORMAL
MDC_IDC_SET_LEADCHNL_RA_PACING_AMPLITUDE: 2.5 V
MDC_IDC_SET_LEADCHNL_RA_PACING_ANODE_ELECTRODE_1: NORMAL
MDC_IDC_SET_LEADCHNL_RA_PACING_ANODE_LOCATION_1: NORMAL
MDC_IDC_SET_LEADCHNL_RA_PACING_CATHODE_ELECTRODE_1: NORMAL
MDC_IDC_SET_LEADCHNL_RA_PACING_CATHODE_LOCATION_1: NORMAL
MDC_IDC_SET_LEADCHNL_RA_PACING_POLARITY: NORMAL
MDC_IDC_SET_LEADCHNL_RA_PACING_PULSEWIDTH: 0.4 MS
MDC_IDC_SET_LEADCHNL_RA_SENSING_ANODE_ELECTRODE_1: NORMAL
MDC_IDC_SET_LEADCHNL_RA_SENSING_ANODE_LOCATION_1: NORMAL
MDC_IDC_SET_LEADCHNL_RA_SENSING_CATHODE_ELECTRODE_1: NORMAL
MDC_IDC_SET_LEADCHNL_RA_SENSING_CATHODE_LOCATION_1: NORMAL
MDC_IDC_SET_LEADCHNL_RA_SENSING_POLARITY: NORMAL
MDC_IDC_SET_LEADCHNL_RA_SENSING_SENSITIVITY: 0.45 MV
MDC_IDC_SET_LEADCHNL_RV_PACING_AMPLITUDE: 2 V
MDC_IDC_SET_LEADCHNL_RV_PACING_ANODE_ELECTRODE_1: NORMAL
MDC_IDC_SET_LEADCHNL_RV_PACING_ANODE_LOCATION_1: NORMAL
MDC_IDC_SET_LEADCHNL_RV_PACING_CATHODE_ELECTRODE_1: NORMAL
MDC_IDC_SET_LEADCHNL_RV_PACING_CATHODE_LOCATION_1: NORMAL
MDC_IDC_SET_LEADCHNL_RV_PACING_POLARITY: NORMAL
MDC_IDC_SET_LEADCHNL_RV_PACING_PULSEWIDTH: 0.4 MS
MDC_IDC_SET_LEADCHNL_RV_SENSING_ANODE_ELECTRODE_1: NORMAL
MDC_IDC_SET_LEADCHNL_RV_SENSING_ANODE_LOCATION_1: NORMAL
MDC_IDC_SET_LEADCHNL_RV_SENSING_CATHODE_ELECTRODE_1: NORMAL
MDC_IDC_SET_LEADCHNL_RV_SENSING_CATHODE_LOCATION_1: NORMAL
MDC_IDC_SET_LEADCHNL_RV_SENSING_POLARITY: NORMAL
MDC_IDC_SET_LEADCHNL_RV_SENSING_SENSITIVITY: 0.3 MV
MDC_IDC_SET_ZONE_DETECTION_BEATS_DENOMINATOR: 40 {BEATS}
MDC_IDC_SET_ZONE_DETECTION_BEATS_NUMERATOR: 30 {BEATS}
MDC_IDC_SET_ZONE_DETECTION_INTERVAL: 240 MS
MDC_IDC_SET_ZONE_DETECTION_INTERVAL: 270 MS
MDC_IDC_SET_ZONE_DETECTION_INTERVAL: 360 MS
MDC_IDC_SET_ZONE_DETECTION_INTERVAL: 400 MS
MDC_IDC_SET_ZONE_DETECTION_INTERVAL: 430 MS
MDC_IDC_SET_ZONE_TYPE: NORMAL
MDC_IDC_STAT_AT_BURDEN_PERCENT: 0.1 %
MDC_IDC_STAT_AT_DTM_END: NORMAL
MDC_IDC_STAT_AT_DTM_START: NORMAL
MDC_IDC_STAT_BRADY_AP_VP_PERCENT: 0 %
MDC_IDC_STAT_BRADY_AP_VS_PERCENT: 0.76 %
MDC_IDC_STAT_BRADY_AS_VP_PERCENT: 0 %
MDC_IDC_STAT_BRADY_AS_VS_PERCENT: 99.24 %
MDC_IDC_STAT_BRADY_DTM_END: NORMAL
MDC_IDC_STAT_BRADY_DTM_START: NORMAL
MDC_IDC_STAT_BRADY_RA_PERCENT_PACED: 0.74 %
MDC_IDC_STAT_BRADY_RV_PERCENT_PACED: 0 %
MDC_IDC_STAT_CRT_DTM_END: NORMAL
MDC_IDC_STAT_CRT_DTM_START: NORMAL
MDC_IDC_STAT_CRT_LV_PERCENT_PACED: 0 %
MDC_IDC_STAT_CRT_PERCENT_PACED: 0 %
MDC_IDC_STAT_EPISODE_RECENT_COUNT: 0
MDC_IDC_STAT_EPISODE_RECENT_COUNT: 1
MDC_IDC_STAT_EPISODE_RECENT_COUNT: 4
MDC_IDC_STAT_EPISODE_RECENT_COUNT: 4
MDC_IDC_STAT_EPISODE_RECENT_COUNT_DTM_END: NORMAL
MDC_IDC_STAT_EPISODE_RECENT_COUNT_DTM_START: NORMAL
MDC_IDC_STAT_EPISODE_TOTAL_COUNT: 0
MDC_IDC_STAT_EPISODE_TOTAL_COUNT: 0
MDC_IDC_STAT_EPISODE_TOTAL_COUNT: 17
MDC_IDC_STAT_EPISODE_TOTAL_COUNT: 186
MDC_IDC_STAT_EPISODE_TOTAL_COUNT: 19
MDC_IDC_STAT_EPISODE_TOTAL_COUNT: 2
MDC_IDC_STAT_EPISODE_TOTAL_COUNT: 292
MDC_IDC_STAT_EPISODE_TOTAL_COUNT_DTM_END: NORMAL
MDC_IDC_STAT_EPISODE_TOTAL_COUNT_DTM_START: NORMAL
MDC_IDC_STAT_EPISODE_TYPE: NORMAL
MDC_IDC_STAT_TACHYTHERAPY_ATP_DELIVERED_RECENT: 0
MDC_IDC_STAT_TACHYTHERAPY_ATP_DELIVERED_TOTAL: 14
MDC_IDC_STAT_TACHYTHERAPY_RECENT_DTM_END: NORMAL
MDC_IDC_STAT_TACHYTHERAPY_RECENT_DTM_START: NORMAL
MDC_IDC_STAT_TACHYTHERAPY_SHOCKS_ABORTED_RECENT: 0
MDC_IDC_STAT_TACHYTHERAPY_SHOCKS_ABORTED_TOTAL: 2
MDC_IDC_STAT_TACHYTHERAPY_SHOCKS_DELIVERED_RECENT: 1
MDC_IDC_STAT_TACHYTHERAPY_SHOCKS_DELIVERED_TOTAL: 3
MDC_IDC_STAT_TACHYTHERAPY_TOTAL_DTM_END: NORMAL
MDC_IDC_STAT_TACHYTHERAPY_TOTAL_DTM_START: NORMAL
POTASSIUM SERPL-SCNC: 3.6 MMOL/L (ref 3.4–5.3)
SODIUM SERPL-SCNC: 139 MMOL/L (ref 133–144)

## 2020-06-16 PROCEDURE — 80048 BASIC METABOLIC PNL TOTAL CA: CPT | Performed by: INTERNAL MEDICINE

## 2020-06-16 PROCEDURE — 85610 PROTHROMBIN TIME: CPT | Performed by: INTERNAL MEDICINE

## 2020-06-16 PROCEDURE — 36415 COLL VENOUS BLD VENIPUNCTURE: CPT | Performed by: INTERNAL MEDICINE

## 2020-06-19 ENCOUNTER — ANCILLARY PROCEDURE (OUTPATIENT)
Dept: CARDIOLOGY | Facility: CLINIC | Age: 65
End: 2020-06-19
Attending: INTERNAL MEDICINE

## 2020-06-19 DIAGNOSIS — I42.9 CARDIOMYOPATHY (H): ICD-10-CM

## 2020-06-19 LAB
MDC_IDC_EPISODE_DTM: NORMAL
MDC_IDC_EPISODE_DURATION: 1 S
MDC_IDC_EPISODE_DURATION: 1 S
MDC_IDC_EPISODE_DURATION: 179 S
MDC_IDC_EPISODE_DURATION: 18 S
MDC_IDC_EPISODE_DURATION: 6 S
MDC_IDC_EPISODE_DURATION: 8 S
MDC_IDC_EPISODE_ID: 711
MDC_IDC_EPISODE_ID: 712
MDC_IDC_EPISODE_ID: 713
MDC_IDC_EPISODE_ID: 714
MDC_IDC_EPISODE_ID: 715
MDC_IDC_EPISODE_ID: 716
MDC_IDC_EPISODE_ID: NORMAL
MDC_IDC_EPISODE_TYPE: NORMAL
MDC_IDC_LEAD_IMPLANT_DT: NORMAL
MDC_IDC_LEAD_LOCATION: NORMAL
MDC_IDC_LEAD_LOCATION_DETAIL_1: NORMAL
MDC_IDC_LEAD_MFG: NORMAL
MDC_IDC_LEAD_MODEL: NORMAL
MDC_IDC_LEAD_POLARITY_TYPE: NORMAL
MDC_IDC_LEAD_SERIAL: NORMAL
MDC_IDC_MSMT_BATTERY_DTM: NORMAL
MDC_IDC_MSMT_BATTERY_REMAINING_LONGEVITY: 10 MO
MDC_IDC_MSMT_BATTERY_RRT_TRIGGER: 2.73
MDC_IDC_MSMT_BATTERY_STATUS: NORMAL
MDC_IDC_MSMT_BATTERY_VOLTAGE: 2.78 V
MDC_IDC_MSMT_CAP_CHARGE_DTM: NORMAL
MDC_IDC_MSMT_CAP_CHARGE_DTM: NORMAL
MDC_IDC_MSMT_CAP_CHARGE_ENERGY: 18 J
MDC_IDC_MSMT_CAP_CHARGE_ENERGY: 35 J
MDC_IDC_MSMT_CAP_CHARGE_TIME: 10.66 S
MDC_IDC_MSMT_CAP_CHARGE_TIME: 4.52
MDC_IDC_MSMT_CAP_CHARGE_TYPE: NORMAL
MDC_IDC_MSMT_CAP_CHARGE_TYPE: NORMAL
MDC_IDC_MSMT_LEADCHNL_LV_IMPEDANCE_VALUE: 399 OHM
MDC_IDC_MSMT_LEADCHNL_LV_IMPEDANCE_VALUE: 4047 OHM
MDC_IDC_MSMT_LEADCHNL_LV_IMPEDANCE_VALUE: 4047 OHM
MDC_IDC_MSMT_LEADCHNL_LV_PACING_THRESHOLD_AMPLITUDE: 0.75 V
MDC_IDC_MSMT_LEADCHNL_LV_PACING_THRESHOLD_PULSEWIDTH: 0.4 MS
MDC_IDC_MSMT_LEADCHNL_RA_IMPEDANCE_VALUE: 456 OHM
MDC_IDC_MSMT_LEADCHNL_RA_PACING_THRESHOLD_AMPLITUDE: 1.5 V
MDC_IDC_MSMT_LEADCHNL_RA_PACING_THRESHOLD_PULSEWIDTH: 0.4 MS
MDC_IDC_MSMT_LEADCHNL_RA_SENSING_INTR_AMPL: 4.8 MV
MDC_IDC_MSMT_LEADCHNL_RV_IMPEDANCE_VALUE: 247 OHM
MDC_IDC_MSMT_LEADCHNL_RV_IMPEDANCE_VALUE: 342 OHM
MDC_IDC_MSMT_LEADCHNL_RV_PACING_THRESHOLD_AMPLITUDE: 0.75 V
MDC_IDC_MSMT_LEADCHNL_RV_PACING_THRESHOLD_PULSEWIDTH: 0.4 MS
MDC_IDC_MSMT_LEADCHNL_RV_SENSING_INTR_AMPL: 6.9 MV
MDC_IDC_PG_IMPLANT_DTM: NORMAL
MDC_IDC_PG_MFG: NORMAL
MDC_IDC_PG_MODEL: NORMAL
MDC_IDC_PG_SERIAL: NORMAL
MDC_IDC_PG_TYPE: NORMAL
MDC_IDC_SESS_CLINIC_NAME: NORMAL
MDC_IDC_SESS_DTM: NORMAL
MDC_IDC_SESS_TYPE: NORMAL
MDC_IDC_SET_BRADY_LOWRATE: 50 {BEATS}/MIN
MDC_IDC_SET_BRADY_MAX_SENSOR_RATE: 130 {BEATS}/MIN
MDC_IDC_SET_BRADY_MODE: NORMAL
MDC_IDC_SET_CRT_PACED_CHAMBERS: NORMAL
MDC_IDC_SET_LEADCHNL_LV_PACING_ANODE_ELECTRODE_1: NORMAL
MDC_IDC_SET_LEADCHNL_LV_PACING_ANODE_LOCATION_1: NORMAL
MDC_IDC_SET_LEADCHNL_LV_PACING_CATHODE_ELECTRODE_1: NORMAL
MDC_IDC_SET_LEADCHNL_LV_PACING_CATHODE_LOCATION_1: NORMAL
MDC_IDC_SET_LEADCHNL_LV_PACING_POLARITY: NORMAL
MDC_IDC_SET_LEADCHNL_RA_PACING_AMPLITUDE: 2.5 V
MDC_IDC_SET_LEADCHNL_RA_PACING_ANODE_ELECTRODE_1: NORMAL
MDC_IDC_SET_LEADCHNL_RA_PACING_ANODE_LOCATION_1: NORMAL
MDC_IDC_SET_LEADCHNL_RA_PACING_CATHODE_ELECTRODE_1: NORMAL
MDC_IDC_SET_LEADCHNL_RA_PACING_CATHODE_LOCATION_1: NORMAL
MDC_IDC_SET_LEADCHNL_RA_PACING_POLARITY: NORMAL
MDC_IDC_SET_LEADCHNL_RA_PACING_PULSEWIDTH: 0.4 MS
MDC_IDC_SET_LEADCHNL_RA_SENSING_ANODE_ELECTRODE_1: NORMAL
MDC_IDC_SET_LEADCHNL_RA_SENSING_ANODE_LOCATION_1: NORMAL
MDC_IDC_SET_LEADCHNL_RA_SENSING_CATHODE_ELECTRODE_1: NORMAL
MDC_IDC_SET_LEADCHNL_RA_SENSING_CATHODE_LOCATION_1: NORMAL
MDC_IDC_SET_LEADCHNL_RA_SENSING_POLARITY: NORMAL
MDC_IDC_SET_LEADCHNL_RA_SENSING_SENSITIVITY: 0.45 MV
MDC_IDC_SET_LEADCHNL_RV_PACING_AMPLITUDE: 2 V
MDC_IDC_SET_LEADCHNL_RV_PACING_ANODE_ELECTRODE_1: NORMAL
MDC_IDC_SET_LEADCHNL_RV_PACING_ANODE_LOCATION_1: NORMAL
MDC_IDC_SET_LEADCHNL_RV_PACING_CATHODE_ELECTRODE_1: NORMAL
MDC_IDC_SET_LEADCHNL_RV_PACING_CATHODE_LOCATION_1: NORMAL
MDC_IDC_SET_LEADCHNL_RV_PACING_POLARITY: NORMAL
MDC_IDC_SET_LEADCHNL_RV_PACING_PULSEWIDTH: 0.4 MS
MDC_IDC_SET_LEADCHNL_RV_SENSING_ANODE_ELECTRODE_1: NORMAL
MDC_IDC_SET_LEADCHNL_RV_SENSING_ANODE_LOCATION_1: NORMAL
MDC_IDC_SET_LEADCHNL_RV_SENSING_CATHODE_ELECTRODE_1: NORMAL
MDC_IDC_SET_LEADCHNL_RV_SENSING_CATHODE_LOCATION_1: NORMAL
MDC_IDC_SET_LEADCHNL_RV_SENSING_POLARITY: NORMAL
MDC_IDC_SET_LEADCHNL_RV_SENSING_SENSITIVITY: 0.3 MV
MDC_IDC_SET_ZONE_DETECTION_BEATS_DENOMINATOR: 40 {BEATS}
MDC_IDC_SET_ZONE_DETECTION_BEATS_NUMERATOR: 30 {BEATS}
MDC_IDC_SET_ZONE_DETECTION_INTERVAL: 240 MS
MDC_IDC_SET_ZONE_DETECTION_INTERVAL: 270 MS
MDC_IDC_SET_ZONE_DETECTION_INTERVAL: 360 MS
MDC_IDC_SET_ZONE_DETECTION_INTERVAL: 400 MS
MDC_IDC_SET_ZONE_DETECTION_INTERVAL: 430 MS
MDC_IDC_SET_ZONE_TYPE: NORMAL
MDC_IDC_STAT_AT_BURDEN_PERCENT: 0 %
MDC_IDC_STAT_AT_DTM_END: NORMAL
MDC_IDC_STAT_AT_DTM_START: NORMAL
MDC_IDC_STAT_BRADY_AP_VP_PERCENT: 0.02 %
MDC_IDC_STAT_BRADY_AP_VS_PERCENT: 0.54 %
MDC_IDC_STAT_BRADY_AS_VP_PERCENT: 0.02 %
MDC_IDC_STAT_BRADY_AS_VS_PERCENT: 99.42 %
MDC_IDC_STAT_BRADY_DTM_END: NORMAL
MDC_IDC_STAT_BRADY_DTM_START: NORMAL
MDC_IDC_STAT_BRADY_RA_PERCENT_PACED: 0.59 %
MDC_IDC_STAT_BRADY_RV_PERCENT_PACED: 0.03 %
MDC_IDC_STAT_CRT_DTM_END: NORMAL
MDC_IDC_STAT_CRT_DTM_START: NORMAL
MDC_IDC_STAT_CRT_LV_PERCENT_PACED: 0.03 %
MDC_IDC_STAT_CRT_PERCENT_PACED: 0.03 %
MDC_IDC_STAT_EPISODE_RECENT_COUNT: 0
MDC_IDC_STAT_EPISODE_RECENT_COUNT: 2
MDC_IDC_STAT_EPISODE_RECENT_COUNT: 6
MDC_IDC_STAT_EPISODE_RECENT_COUNT_DTM_END: NORMAL
MDC_IDC_STAT_EPISODE_RECENT_COUNT_DTM_START: NORMAL
MDC_IDC_STAT_EPISODE_TOTAL_COUNT: 0
MDC_IDC_STAT_EPISODE_TOTAL_COUNT: 0
MDC_IDC_STAT_EPISODE_TOTAL_COUNT: 18
MDC_IDC_STAT_EPISODE_TOTAL_COUNT: 188
MDC_IDC_STAT_EPISODE_TOTAL_COUNT: 19
MDC_IDC_STAT_EPISODE_TOTAL_COUNT: 2
MDC_IDC_STAT_EPISODE_TOTAL_COUNT: 292
MDC_IDC_STAT_EPISODE_TOTAL_COUNT_DTM_END: NORMAL
MDC_IDC_STAT_EPISODE_TOTAL_COUNT_DTM_START: NORMAL
MDC_IDC_STAT_EPISODE_TYPE: NORMAL
MDC_IDC_STAT_TACHYTHERAPY_ATP_DELIVERED_RECENT: 0
MDC_IDC_STAT_TACHYTHERAPY_ATP_DELIVERED_TOTAL: 14
MDC_IDC_STAT_TACHYTHERAPY_RECENT_DTM_END: NORMAL
MDC_IDC_STAT_TACHYTHERAPY_RECENT_DTM_START: NORMAL
MDC_IDC_STAT_TACHYTHERAPY_SHOCKS_ABORTED_RECENT: 1
MDC_IDC_STAT_TACHYTHERAPY_SHOCKS_ABORTED_TOTAL: 3
MDC_IDC_STAT_TACHYTHERAPY_SHOCKS_DELIVERED_RECENT: 1
MDC_IDC_STAT_TACHYTHERAPY_SHOCKS_DELIVERED_TOTAL: 3
MDC_IDC_STAT_TACHYTHERAPY_TOTAL_DTM_END: NORMAL
MDC_IDC_STAT_TACHYTHERAPY_TOTAL_DTM_START: NORMAL

## 2020-06-20 ENCOUNTER — CARE COORDINATION (OUTPATIENT)
Dept: CARDIOLOGY | Facility: CLINIC | Age: 65
End: 2020-06-20

## 2020-06-20 ENCOUNTER — ANCILLARY PROCEDURE (OUTPATIENT)
Dept: CARDIOLOGY | Facility: CLINIC | Age: 65
End: 2020-06-20
Attending: INTERNAL MEDICINE
Payer: COMMERCIAL

## 2020-06-20 DIAGNOSIS — I47.29 PAROXYSMAL VT (H): Primary | ICD-10-CM

## 2020-06-20 DIAGNOSIS — I50.22 CHRONIC SYSTOLIC CONGESTIVE HEART FAILURE (H): ICD-10-CM

## 2020-06-20 DIAGNOSIS — I47.29 PAROXYSMAL VT (H): ICD-10-CM

## 2020-06-20 DIAGNOSIS — Z95.811 LVAD (LEFT VENTRICULAR ASSIST DEVICE) PRESENT (H): ICD-10-CM

## 2020-06-20 PROCEDURE — 99207 CARDIAC DEVICE CHECK - REMOTE: CPT | Performed by: INTERNAL MEDICINE

## 2020-06-20 RX ORDER — AMIODARONE HYDROCHLORIDE 400 MG/1
400 TABLET ORAL DAILY
Qty: 30 TABLET | Refills: 11 | Status: SHIPPED | OUTPATIENT
Start: 2020-06-29 | End: 2020-08-05

## 2020-06-20 RX ORDER — AMIODARONE HYDROCHLORIDE 400 MG/1
400 TABLET ORAL 2 TIMES DAILY
Qty: 14 TABLET | Refills: 0 | Status: SHIPPED | OUTPATIENT
Start: 2020-06-20 | End: 2020-07-16

## 2020-06-20 NOTE — PROGRESS NOTES
D: Device nurse notified VAD Coordinator that patient had received a shock this morning for VF at 300bpm.  The one shock converted him.  I: VAD Coordinator called pt to check in. Pt stated that he is feeling well and VAD numbers stable. Pt's potassium on 6/16 was 3.6. Pt is prescribed potassium 20meq daily. Discussed pt with Dr. Conte. Per Dr. Conte, pt should increase potassium to 20meq BID and should be seen in-person in clinic this coming week. Also, requested that writer notify Dr. Gonzales about pt's shock (and shock that he had on 6/11) and to ask if she'd like to change anything. Writer called pt back with instructions. When talking to patient, pt realized that he was accidentally only taking potassium 10meq daily. Instructed pt to double that dose and take 20meq daily. Pt verbalized understanding. Writer sent message to  regarding getting patient into clinic this week and writer sent epic message to Dr. Gonzales.  Instructed pt to call device nurse if he receives any more shocks and to call VAD Coord on-call if he feels unwell; pt verbalized understanding.  P: Pt will be seen in clinic this coming week.

## 2020-06-20 NOTE — PROGRESS NOTES
D: Pt received one ICD shock this morning and also had received one on 6/11. Dr. Gonzales reviewed pt's case.  I: Per Dr. Gonzales, pt should start Amiodarone 400mg BID x 1 week and then 400mg daily.  Called patient with instructions, patient verbalized understanding. Notified Dr. Montgomery and pt's primary VAD Coordinator. Also per Dr. Gonzales, no programming changes needed at this time.  P: Pt will be seen in clinic this coming week.

## 2020-06-21 ENCOUNTER — DOCUMENTATION ONLY (OUTPATIENT)
Dept: CARDIOLOGY | Facility: CLINIC | Age: 65
End: 2020-06-21

## 2020-06-21 NOTE — PROGRESS NOTES
Received notification of ICD shock: remote interrogation reviewed. Appropriate for fast VT in VF detection zone; detection duration already programmed at 30/40 intervals. Shock appropriately terminated VT. He was in sinus. Had prior shock a week ago for VT, increasing NSVT. Will initiate Amiodarone 400 bid x 1 week, then 400 every day.  Labs 5/15/2020: cr 1.22, ALT 30, AST 23  Last TSH on 7/31/2019 was 1.23    Above communicated with VAD team. Will continue home monitor. Would get TSH next available.  Follow TSH/AST/ALT maye 6 months.    Darcie Gonzales MD

## 2020-06-23 DIAGNOSIS — I50.22 CHRONIC SYSTOLIC CONGESTIVE HEART FAILURE (H): Primary | ICD-10-CM

## 2020-06-23 LAB
MDC_IDC_EPISODE_DTM: NORMAL
MDC_IDC_EPISODE_DURATION: 1 S
MDC_IDC_EPISODE_DURATION: 14 S
MDC_IDC_EPISODE_DURATION: 19 S
MDC_IDC_EPISODE_DURATION: 3 S
MDC_IDC_EPISODE_ID: 717
MDC_IDC_EPISODE_ID: 718
MDC_IDC_EPISODE_ID: 719
MDC_IDC_EPISODE_ID: 720
MDC_IDC_EPISODE_TYPE: NORMAL
MDC_IDC_LEAD_IMPLANT_DT: NORMAL
MDC_IDC_LEAD_LOCATION: NORMAL
MDC_IDC_LEAD_LOCATION_DETAIL_1: NORMAL
MDC_IDC_LEAD_MFG: NORMAL
MDC_IDC_LEAD_MODEL: NORMAL
MDC_IDC_LEAD_POLARITY_TYPE: NORMAL
MDC_IDC_LEAD_SERIAL: NORMAL
MDC_IDC_MSMT_BATTERY_REMAINING_LONGEVITY: 10 MO
MDC_IDC_MSMT_BATTERY_RRT_TRIGGER: 2.73
MDC_IDC_MSMT_BATTERY_STATUS: NORMAL
MDC_IDC_MSMT_CAP_CHARGE_DTM: NORMAL
MDC_IDC_MSMT_CAP_CHARGE_DTM: NORMAL
MDC_IDC_MSMT_CAP_CHARGE_ENERGY: 18 J
MDC_IDC_MSMT_CAP_CHARGE_ENERGY: 35 J
MDC_IDC_MSMT_CAP_CHARGE_TIME: 4.52
MDC_IDC_MSMT_CAP_CHARGE_TIME: 9.93
MDC_IDC_MSMT_CAP_CHARGE_TYPE: NORMAL
MDC_IDC_MSMT_CAP_CHARGE_TYPE: NORMAL
MDC_IDC_MSMT_LEADCHNL_LV_IMPEDANCE_VALUE: 4047 OHM
MDC_IDC_MSMT_LEADCHNL_LV_IMPEDANCE_VALUE: 4047 OHM
MDC_IDC_MSMT_LEADCHNL_LV_IMPEDANCE_VALUE: 437 OHM
MDC_IDC_MSMT_LEADCHNL_LV_PACING_THRESHOLD_AMPLITUDE: 0.62 V
MDC_IDC_MSMT_LEADCHNL_LV_PACING_THRESHOLD_PULSEWIDTH: 0.4 MS
MDC_IDC_MSMT_LEADCHNL_RA_IMPEDANCE_VALUE: 494 OHM
MDC_IDC_MSMT_LEADCHNL_RA_PACING_THRESHOLD_AMPLITUDE: 1.25 V
MDC_IDC_MSMT_LEADCHNL_RA_PACING_THRESHOLD_PULSEWIDTH: 0.4 MS
MDC_IDC_MSMT_LEADCHNL_RA_SENSING_INTR_AMPL: 3.75 MV
MDC_IDC_MSMT_LEADCHNL_RA_SENSING_INTR_AMPL: 3.75 MV
MDC_IDC_MSMT_LEADCHNL_RV_IMPEDANCE_VALUE: 247 OHM
MDC_IDC_MSMT_LEADCHNL_RV_IMPEDANCE_VALUE: 380 OHM
MDC_IDC_MSMT_LEADCHNL_RV_PACING_THRESHOLD_AMPLITUDE: 1 V
MDC_IDC_MSMT_LEADCHNL_RV_PACING_THRESHOLD_PULSEWIDTH: 0.4 MS
MDC_IDC_MSMT_LEADCHNL_RV_SENSING_INTR_AMPL: 7.12 MV
MDC_IDC_MSMT_LEADCHNL_RV_SENSING_INTR_AMPL: 7.12 MV
MDC_IDC_PG_IMPLANT_DTM: NORMAL
MDC_IDC_PG_MFG: NORMAL
MDC_IDC_PG_MODEL: NORMAL
MDC_IDC_PG_SERIAL: NORMAL
MDC_IDC_PG_TYPE: NORMAL
MDC_IDC_SESS_CLINIC_NAME: NORMAL
MDC_IDC_SESS_DTM: NORMAL
MDC_IDC_SESS_TYPE: NORMAL
MDC_IDC_SET_BRADY_LOWRATE: 50 {BEATS}/MIN
MDC_IDC_SET_BRADY_MAX_SENSOR_RATE: 130 {BEATS}/MIN
MDC_IDC_SET_BRADY_MODE: NORMAL
MDC_IDC_SET_CRT_PACED_CHAMBERS: NORMAL
MDC_IDC_SET_LEADCHNL_LV_PACING_ANODE_ELECTRODE_1: NORMAL
MDC_IDC_SET_LEADCHNL_LV_PACING_ANODE_LOCATION_1: NORMAL
MDC_IDC_SET_LEADCHNL_LV_PACING_CATHODE_ELECTRODE_1: NORMAL
MDC_IDC_SET_LEADCHNL_LV_PACING_CATHODE_LOCATION_1: NORMAL
MDC_IDC_SET_LEADCHNL_LV_PACING_POLARITY: NORMAL
MDC_IDC_SET_LEADCHNL_RA_PACING_AMPLITUDE: 2.5 V
MDC_IDC_SET_LEADCHNL_RA_PACING_ANODE_ELECTRODE_1: NORMAL
MDC_IDC_SET_LEADCHNL_RA_PACING_ANODE_LOCATION_1: NORMAL
MDC_IDC_SET_LEADCHNL_RA_PACING_CATHODE_ELECTRODE_1: NORMAL
MDC_IDC_SET_LEADCHNL_RA_PACING_CATHODE_LOCATION_1: NORMAL
MDC_IDC_SET_LEADCHNL_RA_PACING_POLARITY: NORMAL
MDC_IDC_SET_LEADCHNL_RA_PACING_PULSEWIDTH: 0.4 MS
MDC_IDC_SET_LEADCHNL_RA_SENSING_ANODE_ELECTRODE_1: NORMAL
MDC_IDC_SET_LEADCHNL_RA_SENSING_ANODE_LOCATION_1: NORMAL
MDC_IDC_SET_LEADCHNL_RA_SENSING_CATHODE_ELECTRODE_1: NORMAL
MDC_IDC_SET_LEADCHNL_RA_SENSING_CATHODE_LOCATION_1: NORMAL
MDC_IDC_SET_LEADCHNL_RA_SENSING_POLARITY: NORMAL
MDC_IDC_SET_LEADCHNL_RA_SENSING_SENSITIVITY: 0.45 MV
MDC_IDC_SET_LEADCHNL_RV_PACING_AMPLITUDE: 2 V
MDC_IDC_SET_LEADCHNL_RV_PACING_ANODE_ELECTRODE_1: NORMAL
MDC_IDC_SET_LEADCHNL_RV_PACING_ANODE_LOCATION_1: NORMAL
MDC_IDC_SET_LEADCHNL_RV_PACING_CATHODE_ELECTRODE_1: NORMAL
MDC_IDC_SET_LEADCHNL_RV_PACING_CATHODE_LOCATION_1: NORMAL
MDC_IDC_SET_LEADCHNL_RV_PACING_POLARITY: NORMAL
MDC_IDC_SET_LEADCHNL_RV_PACING_PULSEWIDTH: 0.4 MS
MDC_IDC_SET_LEADCHNL_RV_SENSING_ANODE_ELECTRODE_1: NORMAL
MDC_IDC_SET_LEADCHNL_RV_SENSING_ANODE_LOCATION_1: NORMAL
MDC_IDC_SET_LEADCHNL_RV_SENSING_CATHODE_ELECTRODE_1: NORMAL
MDC_IDC_SET_LEADCHNL_RV_SENSING_CATHODE_LOCATION_1: NORMAL
MDC_IDC_SET_LEADCHNL_RV_SENSING_POLARITY: NORMAL
MDC_IDC_SET_LEADCHNL_RV_SENSING_SENSITIVITY: 0.3 MV
MDC_IDC_SET_ZONE_DETECTION_BEATS_DENOMINATOR: 40 {BEATS}
MDC_IDC_SET_ZONE_DETECTION_BEATS_NUMERATOR: 30 {BEATS}
MDC_IDC_SET_ZONE_DETECTION_INTERVAL: 240 MS
MDC_IDC_SET_ZONE_DETECTION_INTERVAL: 270 MS
MDC_IDC_SET_ZONE_DETECTION_INTERVAL: 360 MS
MDC_IDC_SET_ZONE_DETECTION_INTERVAL: 400 MS
MDC_IDC_SET_ZONE_DETECTION_INTERVAL: 430 MS
MDC_IDC_SET_ZONE_TYPE: NORMAL
MDC_IDC_STAT_AT_BURDEN_PERCENT: 0 %
MDC_IDC_STAT_AT_DTM_END: NORMAL
MDC_IDC_STAT_AT_DTM_START: NORMAL
MDC_IDC_STAT_BRADY_AP_VP_PERCENT: 0 %
MDC_IDC_STAT_BRADY_AP_VS_PERCENT: 0.08 %
MDC_IDC_STAT_BRADY_AS_VP_PERCENT: 0 %
MDC_IDC_STAT_BRADY_AS_VS_PERCENT: 99.92 %
MDC_IDC_STAT_BRADY_DTM_END: NORMAL
MDC_IDC_STAT_BRADY_DTM_START: NORMAL
MDC_IDC_STAT_BRADY_RA_PERCENT_PACED: 0.08 %
MDC_IDC_STAT_BRADY_RV_PERCENT_PACED: 0 %
MDC_IDC_STAT_CRT_DTM_END: NORMAL
MDC_IDC_STAT_CRT_DTM_START: NORMAL
MDC_IDC_STAT_CRT_LV_PERCENT_PACED: 0 %
MDC_IDC_STAT_CRT_PERCENT_PACED: 0 %
MDC_IDC_STAT_EPISODE_RECENT_COUNT: 0
MDC_IDC_STAT_EPISODE_RECENT_COUNT: 1
MDC_IDC_STAT_EPISODE_RECENT_COUNT: 1
MDC_IDC_STAT_EPISODE_RECENT_COUNT_DTM_END: NORMAL
MDC_IDC_STAT_EPISODE_RECENT_COUNT_DTM_START: NORMAL
MDC_IDC_STAT_EPISODE_TOTAL_COUNT: 0
MDC_IDC_STAT_EPISODE_TOTAL_COUNT: 0
MDC_IDC_STAT_EPISODE_TOTAL_COUNT: 189
MDC_IDC_STAT_EPISODE_TOTAL_COUNT: 19
MDC_IDC_STAT_EPISODE_TOTAL_COUNT: 19
MDC_IDC_STAT_EPISODE_TOTAL_COUNT: 2
MDC_IDC_STAT_EPISODE_TOTAL_COUNT: 292
MDC_IDC_STAT_EPISODE_TOTAL_COUNT_DTM_END: NORMAL
MDC_IDC_STAT_EPISODE_TOTAL_COUNT_DTM_START: NORMAL
MDC_IDC_STAT_EPISODE_TYPE: NORMAL
MDC_IDC_STAT_TACHYTHERAPY_ATP_DELIVERED_RECENT: 0
MDC_IDC_STAT_TACHYTHERAPY_ATP_DELIVERED_TOTAL: 14
MDC_IDC_STAT_TACHYTHERAPY_RECENT_DTM_END: NORMAL
MDC_IDC_STAT_TACHYTHERAPY_RECENT_DTM_START: NORMAL
MDC_IDC_STAT_TACHYTHERAPY_SHOCKS_ABORTED_RECENT: 0
MDC_IDC_STAT_TACHYTHERAPY_SHOCKS_ABORTED_TOTAL: 3
MDC_IDC_STAT_TACHYTHERAPY_SHOCKS_DELIVERED_RECENT: 1
MDC_IDC_STAT_TACHYTHERAPY_SHOCKS_DELIVERED_TOTAL: 4
MDC_IDC_STAT_TACHYTHERAPY_TOTAL_DTM_END: NORMAL
MDC_IDC_STAT_TACHYTHERAPY_TOTAL_DTM_START: NORMAL

## 2020-06-24 ENCOUNTER — ANTICOAGULATION THERAPY VISIT (OUTPATIENT)
Dept: ANTICOAGULATION | Facility: CLINIC | Age: 65
End: 2020-06-24

## 2020-06-24 ENCOUNTER — ANCILLARY PROCEDURE (OUTPATIENT)
Dept: GENERAL RADIOLOGY | Facility: CLINIC | Age: 65
End: 2020-06-24
Attending: INTERNAL MEDICINE

## 2020-06-24 ENCOUNTER — OFFICE VISIT (OUTPATIENT)
Dept: CARDIOLOGY | Facility: CLINIC | Age: 65
End: 2020-06-24
Attending: INTERNAL MEDICINE
Payer: COMMERCIAL

## 2020-06-24 ENCOUNTER — ANCILLARY PROCEDURE (OUTPATIENT)
Dept: CARDIOLOGY | Facility: CLINIC | Age: 65
End: 2020-06-24
Attending: INTERNAL MEDICINE

## 2020-06-24 VITALS
HEIGHT: 68 IN | TEMPERATURE: 98.7 F | SYSTOLIC BLOOD PRESSURE: 80 MMHG | BODY MASS INDEX: 38.15 KG/M2 | OXYGEN SATURATION: 98 % | WEIGHT: 251.7 LBS

## 2020-06-24 DIAGNOSIS — I50.22 CHRONIC SYSTOLIC CONGESTIVE HEART FAILURE (H): ICD-10-CM

## 2020-06-24 DIAGNOSIS — Z95.811 LVAD (LEFT VENTRICULAR ASSIST DEVICE) PRESENT (H): ICD-10-CM

## 2020-06-24 DIAGNOSIS — Z79.01 LONG TERM CURRENT USE OF ANTICOAGULANT THERAPY: ICD-10-CM

## 2020-06-24 DIAGNOSIS — I47.29 PAROXYSMAL VT (H): ICD-10-CM

## 2020-06-24 DIAGNOSIS — I50.22 CHRONIC SYSTOLIC CONGESTIVE HEART FAILURE (H): Primary | ICD-10-CM

## 2020-06-24 DIAGNOSIS — I47.29 PAROXYSMAL VENTRICULAR TACHYCARDIA (H): ICD-10-CM

## 2020-06-24 DIAGNOSIS — Z95.810 ICD (IMPLANTABLE CARDIOVERTER-DEFIBRILLATOR), BIVENTRICULAR, IN SITU: ICD-10-CM

## 2020-06-24 LAB
ALBUMIN SERPL-MCNC: 3.9 G/DL (ref 3.4–5)
ALP SERPL-CCNC: 79 U/L (ref 40–150)
ALT SERPL W P-5'-P-CCNC: 27 U/L (ref 0–70)
ANION GAP SERPL CALCULATED.3IONS-SCNC: 7 MMOL/L (ref 3–14)
AST SERPL W P-5'-P-CCNC: 21 U/L (ref 0–45)
BILIRUB SERPL-MCNC: 0.7 MG/DL (ref 0.2–1.3)
BUN SERPL-MCNC: 43 MG/DL (ref 7–30)
CALCIUM SERPL-MCNC: 8.6 MG/DL (ref 8.5–10.1)
CHLORIDE SERPL-SCNC: 98 MMOL/L (ref 94–109)
CO2 SERPL-SCNC: 25 MMOL/L (ref 20–32)
CREAT SERPL-MCNC: 1.68 MG/DL (ref 0.66–1.25)
ERYTHROCYTE [DISTWIDTH] IN BLOOD BY AUTOMATED COUNT: 17.7 % (ref 10–15)
GFR SERPL CREATININE-BSD FRML MDRD: 43 ML/MIN/{1.73_M2}
GLUCOSE SERPL-MCNC: 230 MG/DL (ref 70–99)
HCT VFR BLD AUTO: 38.4 % (ref 40–53)
HGB BLD-MCNC: 11.9 G/DL (ref 13.3–17.7)
INR PPP: 2.63 (ref 0.86–1.14)
LDH SERPL L TO P-CCNC: 435 U/L (ref 85–227)
MCH RBC QN AUTO: 25.6 PG (ref 26.5–33)
MCHC RBC AUTO-ENTMCNC: 31 G/DL (ref 31.5–36.5)
MCV RBC AUTO: 83 FL (ref 78–100)
MDC_IDC_LEAD_IMPLANT_DT: NORMAL
MDC_IDC_LEAD_LOCATION: NORMAL
MDC_IDC_LEAD_LOCATION_DETAIL_1: NORMAL
MDC_IDC_LEAD_MFG: NORMAL
MDC_IDC_LEAD_MODEL: NORMAL
MDC_IDC_LEAD_POLARITY_TYPE: NORMAL
MDC_IDC_LEAD_SERIAL: NORMAL
MDC_IDC_MSMT_BATTERY_DTM: NORMAL
MDC_IDC_MSMT_BATTERY_REMAINING_LONGEVITY: 9 MO
MDC_IDC_MSMT_BATTERY_RRT_TRIGGER: 2.73
MDC_IDC_MSMT_BATTERY_STATUS: NORMAL
MDC_IDC_MSMT_BATTERY_VOLTAGE: 2.84 V
MDC_IDC_MSMT_CAP_CHARGE_DTM: NORMAL
MDC_IDC_MSMT_CAP_CHARGE_DTM: NORMAL
MDC_IDC_MSMT_CAP_CHARGE_ENERGY: 18 J
MDC_IDC_MSMT_CAP_CHARGE_ENERGY: 35 J
MDC_IDC_MSMT_CAP_CHARGE_TIME: 4.52
MDC_IDC_MSMT_CAP_CHARGE_TIME: 9.93
MDC_IDC_MSMT_CAP_CHARGE_TYPE: NORMAL
MDC_IDC_MSMT_CAP_CHARGE_TYPE: NORMAL
MDC_IDC_MSMT_LEADCHNL_LV_IMPEDANCE_VALUE: 4047 OHM
MDC_IDC_MSMT_LEADCHNL_LV_IMPEDANCE_VALUE: 4047 OHM
MDC_IDC_MSMT_LEADCHNL_LV_IMPEDANCE_VALUE: 456 OHM
MDC_IDC_MSMT_LEADCHNL_LV_PACING_THRESHOLD_AMPLITUDE: 0.62 V
MDC_IDC_MSMT_LEADCHNL_LV_PACING_THRESHOLD_PULSEWIDTH: 0.4 MS
MDC_IDC_MSMT_LEADCHNL_RA_IMPEDANCE_VALUE: 494 OHM
MDC_IDC_MSMT_LEADCHNL_RA_PACING_THRESHOLD_AMPLITUDE: 1.5 V
MDC_IDC_MSMT_LEADCHNL_RA_PACING_THRESHOLD_PULSEWIDTH: 0.4 MS
MDC_IDC_MSMT_LEADCHNL_RA_SENSING_INTR_AMPL: 1.12 MV
MDC_IDC_MSMT_LEADCHNL_RA_SENSING_INTR_AMPL: 3 MV
MDC_IDC_MSMT_LEADCHNL_RV_IMPEDANCE_VALUE: 247 OHM
MDC_IDC_MSMT_LEADCHNL_RV_IMPEDANCE_VALUE: 380 OHM
MDC_IDC_MSMT_LEADCHNL_RV_PACING_THRESHOLD_AMPLITUDE: 1 V
MDC_IDC_MSMT_LEADCHNL_RV_PACING_THRESHOLD_PULSEWIDTH: 0.4 MS
MDC_IDC_MSMT_LEADCHNL_RV_SENSING_INTR_AMPL: 5.88 MV
MDC_IDC_MSMT_LEADCHNL_RV_SENSING_INTR_AMPL: 6.25 MV
MDC_IDC_PG_IMPLANT_DTM: NORMAL
MDC_IDC_PG_MFG: NORMAL
MDC_IDC_PG_MODEL: NORMAL
MDC_IDC_PG_SERIAL: NORMAL
MDC_IDC_PG_TYPE: NORMAL
MDC_IDC_SESS_CLINIC_NAME: NORMAL
MDC_IDC_SESS_DTM: NORMAL
MDC_IDC_SESS_TYPE: NORMAL
MDC_IDC_SET_BRADY_LOWRATE: 50 {BEATS}/MIN
MDC_IDC_SET_BRADY_MAX_SENSOR_RATE: 130 {BEATS}/MIN
MDC_IDC_SET_BRADY_MODE: NORMAL
MDC_IDC_SET_CRT_PACED_CHAMBERS: NORMAL
MDC_IDC_SET_LEADCHNL_LV_PACING_ANODE_ELECTRODE_1: NORMAL
MDC_IDC_SET_LEADCHNL_LV_PACING_ANODE_LOCATION_1: NORMAL
MDC_IDC_SET_LEADCHNL_LV_PACING_CATHODE_ELECTRODE_1: NORMAL
MDC_IDC_SET_LEADCHNL_LV_PACING_CATHODE_LOCATION_1: NORMAL
MDC_IDC_SET_LEADCHNL_LV_PACING_POLARITY: NORMAL
MDC_IDC_SET_LEADCHNL_RA_PACING_AMPLITUDE: 2.5 V
MDC_IDC_SET_LEADCHNL_RA_PACING_ANODE_ELECTRODE_1: NORMAL
MDC_IDC_SET_LEADCHNL_RA_PACING_ANODE_LOCATION_1: NORMAL
MDC_IDC_SET_LEADCHNL_RA_PACING_CATHODE_ELECTRODE_1: NORMAL
MDC_IDC_SET_LEADCHNL_RA_PACING_CATHODE_LOCATION_1: NORMAL
MDC_IDC_SET_LEADCHNL_RA_PACING_POLARITY: NORMAL
MDC_IDC_SET_LEADCHNL_RA_PACING_PULSEWIDTH: 0.4 MS
MDC_IDC_SET_LEADCHNL_RA_SENSING_ANODE_ELECTRODE_1: NORMAL
MDC_IDC_SET_LEADCHNL_RA_SENSING_ANODE_LOCATION_1: NORMAL
MDC_IDC_SET_LEADCHNL_RA_SENSING_CATHODE_ELECTRODE_1: NORMAL
MDC_IDC_SET_LEADCHNL_RA_SENSING_CATHODE_LOCATION_1: NORMAL
MDC_IDC_SET_LEADCHNL_RA_SENSING_POLARITY: NORMAL
MDC_IDC_SET_LEADCHNL_RA_SENSING_SENSITIVITY: 0.45 MV
MDC_IDC_SET_LEADCHNL_RV_PACING_AMPLITUDE: 2 V
MDC_IDC_SET_LEADCHNL_RV_PACING_ANODE_ELECTRODE_1: NORMAL
MDC_IDC_SET_LEADCHNL_RV_PACING_ANODE_LOCATION_1: NORMAL
MDC_IDC_SET_LEADCHNL_RV_PACING_CATHODE_ELECTRODE_1: NORMAL
MDC_IDC_SET_LEADCHNL_RV_PACING_CATHODE_LOCATION_1: NORMAL
MDC_IDC_SET_LEADCHNL_RV_PACING_POLARITY: NORMAL
MDC_IDC_SET_LEADCHNL_RV_PACING_PULSEWIDTH: 0.4 MS
MDC_IDC_SET_LEADCHNL_RV_SENSING_ANODE_ELECTRODE_1: NORMAL
MDC_IDC_SET_LEADCHNL_RV_SENSING_ANODE_LOCATION_1: NORMAL
MDC_IDC_SET_LEADCHNL_RV_SENSING_CATHODE_ELECTRODE_1: NORMAL
MDC_IDC_SET_LEADCHNL_RV_SENSING_CATHODE_LOCATION_1: NORMAL
MDC_IDC_SET_LEADCHNL_RV_SENSING_POLARITY: NORMAL
MDC_IDC_SET_LEADCHNL_RV_SENSING_SENSITIVITY: 0.3 MV
MDC_IDC_SET_ZONE_DETECTION_BEATS_DENOMINATOR: 40 {BEATS}
MDC_IDC_SET_ZONE_DETECTION_BEATS_NUMERATOR: 30 {BEATS}
MDC_IDC_SET_ZONE_DETECTION_INTERVAL: 240 MS
MDC_IDC_SET_ZONE_DETECTION_INTERVAL: 270 MS
MDC_IDC_SET_ZONE_DETECTION_INTERVAL: 360 MS
MDC_IDC_SET_ZONE_DETECTION_INTERVAL: 400 MS
MDC_IDC_SET_ZONE_DETECTION_INTERVAL: 430 MS
MDC_IDC_SET_ZONE_TYPE: NORMAL
MDC_IDC_STAT_AT_BURDEN_PERCENT: 0 %
MDC_IDC_STAT_AT_DTM_END: NORMAL
MDC_IDC_STAT_AT_DTM_START: NORMAL
MDC_IDC_STAT_BRADY_AP_VP_PERCENT: 0 %
MDC_IDC_STAT_BRADY_AP_VS_PERCENT: 0.3 %
MDC_IDC_STAT_BRADY_AS_VP_PERCENT: 0 %
MDC_IDC_STAT_BRADY_AS_VS_PERCENT: 99.7 %
MDC_IDC_STAT_BRADY_DTM_END: NORMAL
MDC_IDC_STAT_BRADY_DTM_START: NORMAL
MDC_IDC_STAT_BRADY_RA_PERCENT_PACED: 0.32 %
MDC_IDC_STAT_BRADY_RV_PERCENT_PACED: 0 %
MDC_IDC_STAT_CRT_DTM_END: NORMAL
MDC_IDC_STAT_CRT_DTM_START: NORMAL
MDC_IDC_STAT_CRT_LV_PERCENT_PACED: 0 %
MDC_IDC_STAT_CRT_PERCENT_PACED: 0 %
MDC_IDC_STAT_EPISODE_RECENT_COUNT: 0
MDC_IDC_STAT_EPISODE_RECENT_COUNT_DTM_END: NORMAL
MDC_IDC_STAT_EPISODE_RECENT_COUNT_DTM_START: NORMAL
MDC_IDC_STAT_EPISODE_TOTAL_COUNT: 0
MDC_IDC_STAT_EPISODE_TOTAL_COUNT: 0
MDC_IDC_STAT_EPISODE_TOTAL_COUNT: 189
MDC_IDC_STAT_EPISODE_TOTAL_COUNT: 19
MDC_IDC_STAT_EPISODE_TOTAL_COUNT: 19
MDC_IDC_STAT_EPISODE_TOTAL_COUNT: 2
MDC_IDC_STAT_EPISODE_TOTAL_COUNT: 292
MDC_IDC_STAT_EPISODE_TOTAL_COUNT_DTM_END: NORMAL
MDC_IDC_STAT_EPISODE_TOTAL_COUNT_DTM_START: NORMAL
MDC_IDC_STAT_EPISODE_TYPE: NORMAL
MDC_IDC_STAT_TACHYTHERAPY_ATP_DELIVERED_RECENT: 0
MDC_IDC_STAT_TACHYTHERAPY_ATP_DELIVERED_TOTAL: 14
MDC_IDC_STAT_TACHYTHERAPY_RECENT_DTM_END: NORMAL
MDC_IDC_STAT_TACHYTHERAPY_RECENT_DTM_START: NORMAL
MDC_IDC_STAT_TACHYTHERAPY_SHOCKS_ABORTED_RECENT: 0
MDC_IDC_STAT_TACHYTHERAPY_SHOCKS_ABORTED_TOTAL: 3
MDC_IDC_STAT_TACHYTHERAPY_SHOCKS_DELIVERED_RECENT: 1
MDC_IDC_STAT_TACHYTHERAPY_SHOCKS_DELIVERED_TOTAL: 4
MDC_IDC_STAT_TACHYTHERAPY_TOTAL_DTM_END: NORMAL
MDC_IDC_STAT_TACHYTHERAPY_TOTAL_DTM_START: NORMAL
PLATELET # BLD AUTO: 235 10E9/L (ref 150–450)
POTASSIUM SERPL-SCNC: 4.8 MMOL/L (ref 3.4–5.3)
PROT SERPL-MCNC: 7.3 G/DL (ref 6.8–8.8)
RBC # BLD AUTO: 4.64 10E12/L (ref 4.4–5.9)
SODIUM SERPL-SCNC: 130 MMOL/L (ref 133–144)
TSH SERPL DL<=0.005 MIU/L-ACNC: 1.3 MU/L (ref 0.4–4)
WBC # BLD AUTO: 8.6 10E9/L (ref 4–11)

## 2020-06-24 PROCEDURE — 84443 ASSAY THYROID STIM HORMONE: CPT | Performed by: INTERNAL MEDICINE

## 2020-06-24 PROCEDURE — 93005 ELECTROCARDIOGRAM TRACING: CPT | Mod: ZF

## 2020-06-24 PROCEDURE — 93010 ELECTROCARDIOGRAM REPORT: CPT | Mod: ZP | Performed by: INTERNAL MEDICINE

## 2020-06-24 PROCEDURE — 93750 INTERROGATION VAD IN PERSON: CPT | Mod: ZF

## 2020-06-24 PROCEDURE — 93750 INTERROGATION VAD IN PERSON: CPT | Mod: ZP | Performed by: INTERNAL MEDICINE

## 2020-06-24 PROCEDURE — 80053 COMPREHEN METABOLIC PANEL: CPT | Performed by: INTERNAL MEDICINE

## 2020-06-24 PROCEDURE — G0463 HOSPITAL OUTPT CLINIC VISIT: HCPCS | Mod: 25,ZF

## 2020-06-24 PROCEDURE — 85027 COMPLETE CBC AUTOMATED: CPT | Performed by: INTERNAL MEDICINE

## 2020-06-24 PROCEDURE — 36415 COLL VENOUS BLD VENIPUNCTURE: CPT | Performed by: INTERNAL MEDICINE

## 2020-06-24 PROCEDURE — 85610 PROTHROMBIN TIME: CPT | Performed by: INTERNAL MEDICINE

## 2020-06-24 PROCEDURE — 83615 LACTATE (LD) (LDH) ENZYME: CPT | Performed by: INTERNAL MEDICINE

## 2020-06-24 PROCEDURE — 99214 OFFICE O/P EST MOD 30 MIN: CPT | Mod: 25 | Performed by: INTERNAL MEDICINE

## 2020-06-24 ASSESSMENT — PAIN SCALES - GENERAL: PAINLEVEL: NO PAIN (0)

## 2020-06-24 ASSESSMENT — MIFFLIN-ST. JEOR: SCORE: 1911.2

## 2020-06-24 NOTE — NURSING NOTE
Chief Complaint   Patient presents with     Follow Up     MV Essential in person      Vitals were taken and medications reconciled.     Brandon Livingston CMA  9:03 AM

## 2020-06-24 NOTE — PATIENT INSTRUCTIONS
Medications:  1. Take Bumex 3mg/2mg with 10mEq extra potassium for 2 days. Watch your weight and PIs to go back to your normal. If no results, take 3mg/3mg Bumex for 2 days with 20mEq extra potassium. Return to your normal dose of bumex and potassium after this    Follow-up:  1. Get a chest xray and EKG today before you leave.   2. Get labs drawn in one week with your INR, we want to check your kidney functions at that time.   3. Virtual visit in 2 weeks.   will call you with that information.    Instructions:  1. We applied rescue tape to your drive line today. Keep an eye on your equipment closely for wear and tear. WE will be in contact with you to further repair your driveline again in clinic.  2. Try to limit your salt intake, and limit fluids to about 1.5 liters a day.  2. Call if you experience light headedness, dizziness, or weight gain of 3lbs or more in a day.      Page the VAD Coordinator on call if you gain more than 3 lb in a day or 5 in a week. Please also page if you feel unwell or have alarms.     Great to see you in clinic today. To Page the VAD Coordinator on call, dial [phone] option #4 and ask to speak to the VAD coordinator on call.

## 2020-06-24 NOTE — PROGRESS NOTES
[facility] PHYSICIANS HEART VAD CLINIC NOTE  Chief Complaint:  Heart Failure follow-up  HPI: 64 yo Male with advanced heart failure S/P HM3 placement approx 3 yrs ago, CKD, DM, CECILIA on CPAP, AF with cardioversion and ICD discharge X2 now on amiodarone load.  Pt reports increase of 3-5 lb secondary to dietary issues, increased fluid intake with corresponding increase of PI.  Denies new SOB, RESENDIZ, bipedal edema, palpitations, orthopnea, PND, fevers, chills, NS, driveline drainage or change in power.  Reports compliance of all meds.     PAST MEDICAL HISTORY:  Past Medical History:   Diagnosis Date     Benign essential hypertension 5/11/2017     Cardiomyopathy, unspecified (H) 5/8/2017     CKD (chronic kidney disease) stage 3, GFR 30-59 ml/min (H) 5/11/2017     Depression 5/11/2017     Diabetes mellitus (H) 5/11/2017     H/O gastric bypass 5/11/2017     ICD (implantable cardioverter-defibrillator), biventricular, in situ 5/11/2017     LVAD (left ventricular assist device) present (H)      NICM (nonischemic cardiomyopathy) (H)/ EF 20% 5/11/2017    ECHO: LVEDd. 7.66 cm, Restrictive pattern , Severe mitral valve regurgitation     CECILIA (obstructive sleep apnea) 5/11/2017     Paroxysmal atrial fibrillation (H) 5/11/2017     Paroxysmal VT (H) 5/11/2017     Uncomplicated asthma      Vitamin B12 deficiency (non anemic) 5/11/2017       CURRENT MEDICATIONS:  Current Outpatient Medications   Medication Sig Dispense Refill     acetaminophen (TYLENOL) 325 MG tablet Take 650 mg by mouth every 6 hours as needed for mild pain       albuterol (ALBUTEROL) 108 (90 BASE) MCG/ACT Inhaler Inhale 2 puffs into the lungs every 4 hours as needed for shortness of breath / dyspnea or wheezing       allopurinol (ZYLOPRIM) 100 MG tablet Take 1 tablet (100 mg) by mouth daily 60 tablet 5     amiodarone (PACERONE) 400 MG tablet Take 1 tablet (400 mg) by mouth 2 times daily 14 tablet 0     aspirin 81 MG chewable tablet 1 tablet (81 mg) by Oral  or Feeding Tube route daily 36 tablet      blood glucose monitoring (ONE TOUCH ULTRA 2) meter device kit Use to test blood sugar four times daily or as directed. 1 kit 0     blood glucose VI test strip Use to test blood sugar four times daily or as directed. 200 each 0     bumetanide (BUMEX) 1 MG tablet Take 2 tablets (2 mg) by mouth 2 times daily 120 tablet 11     citalopram (CELEXA) 20 MG tablet Take 20 mg by mouth daily       cyanocobalamin (VITAMIN B12) 1000 MCG/ML injection Inject 1,000 mcg into the muscle every 30 days       fluticasone-vilanterol (BREO ELLIPTA) 200-25 MCG/INH oral inhaler Inhale 1 puff into the lungs daily       gabapentin (NEURONTIN) 300 MG capsule Take 1 capsule (300 mg) by mouth twice daily and 2 capsules (600 mg) by mouth at bedtime daily.       insulin lispro (HUMALOG KWIKPEN) 100 UNIT/ML (1 unit dial) pen Inject 1-7 Units Subcutaneous 4 times daily 3 mL 1     insulin pen needle (32G X 4 MM) 32G X 4 MM miscellaneous Use four pen needles daily or as directed. 110 each 0     levalbuterol (XOPENEX) 1.25 MG/3ML neb solution Take 3 mLs (1.25 mg) by nebulization every 6 hours as needed for shortness of breath / dyspnea or wheezing 30 mL 0     losartan (COZAAR) 25 MG tablet Take 12.5 mg by mouth 2 times daily 60 tablet 0     metFORMIN (GLUCOPHAGE) 500 MG tablet Take 1 tablet (500 mg) by mouth 2 times daily (with meals) 60 tablet 0     metoprolol succinate ER (TOPROL-XL) 25 MG 24 hr tablet Take 1 tablet (25 mg) by mouth 2 times daily 270 tablet 2     montelukast (SINGULAIR) 10 MG tablet Take 10 mg by mouth At Bedtime       pantoprazole (PROTONIX) 40 MG EC tablet Take 1 tablet (40 mg) by mouth every morning 60 tablet 5     potassium chloride ER (K-TAB/KLOR-CON) 10 MEQ CR tablet Take 2 tablets (20 mEq) by mouth daily 120 tablet 5     predniSONE (DELTASONE) 20 MG tablet Take 1 tablet (20 mg) by mouth daily 14 tablet 0     spironolactone (ALDACTONE) 25 MG tablet Take 25 mg by mouth 2 times daily        traMADol (ULTRAM) 50 MG tablet Take 2 tablets (100 mg) by mouth every 6 hours as needed for moderate pain or breakthrough pain 28 tablet      umeclidinium (INCRUSE ELLIPTA) 62.5 MCG/INH oral inhaler Inhale 1 puff into the lungs daily       warfarin (COUMADIN) 5 MG tablet Take 5 - 7.5mg daily or as directed by coumadin clinic. 90 tablet 5     [START ON 6/29/2020] amiodarone (PACERONE) 400 MG tablet Take 1 tablet (400 mg) by mouth daily (Patient not taking: Reported on 6/24/2020) 30 tablet 11     zinc sulfate (ZINCATE) 220 (50 ZN) MG capsule Take 220 mg by mouth 2 times daily         PAST SURGICAL HISTORY:  Past Surgical History:   Procedure Laterality Date     ANESTHESIA CARDIOVERSION N/A 5/11/2020    Procedure: ANESTHESIA, FOR CARDIOVERSION @1100;  Surgeon: GENERIC ANESTHESIA PROVIDER;  Location:  OR     CV RIGHT HEART CATH N/A 7/24/2019    Procedure: CV RIGHT HEART CATH;  Surgeon: Renu Sears MD;  Location:  HEART CARDIAC CATH LAB     GI SURGERY  2003    [facility]     INSERT VENTRICULAR ASSIST DEVICE LEFT (HEARTMATE II) N/A 6/19/2017    Procedure: INSERT VENTRICULAR ASSIST DEVICE LEFT (HEARTMATE II);  Median Sternotomy Heartmate II Left Ventricular Assist Device Insertion on Pump Oxygenator;  Surgeon: Ronnie Quigley MD;  Location:  OR     ORTHOPEDIC SURGERY  1994    right knee wired       ALLERGIES     Allergies   Allergen Reactions     Beer Shortness Of Breath     Grass Shortness Of Breath     Ace Inhibitors Unknown     Dust Mites Other (See Comments)     Asthma     Mold Other (See Comments)     Asthma     Penicillins      Sulfa Drugs Unknown and Other (See Comments)     PN: unknown       FAMILY HISTORY:  Family History   Problem Relation Age of Onset     Cerebrovascular Disease Mother      Diabetes Mother      Hypertension Mother      Coronary Artery Disease Father      Diabetes Type 2  Father        SOCIAL HISTORY:  Social History     Socioeconomic History     Marital status:       "Spouse name: None     Number of children: None     Years of education: None     Highest education level: None   Occupational History     None   Social Needs     Financial resource strain: None     Food insecurity     Worry: None     Inability: None     Transportation needs     Medical: None     Non-medical: None   Tobacco Use     Smoking status: Former Smoker     Last attempt to quit:      Years since quittin.4     Smokeless tobacco: Never Used   Substance and Sexual Activity     Alcohol use: No     Drug use: No     Sexual activity: Yes     Partners: Female   Lifestyle     Physical activity     Days per week: None     Minutes per session: None     Stress: None   Relationships     Social connections     Talks on phone: None     Gets together: None     Attends Denominational service: None     Active member of club or organization: None     Attends meetings of clubs or organizations: None     Relationship status: None     Intimate partner violence     Fear of current or ex partner: None     Emotionally abused: None     Physically abused: None     Forced sexual activity: None   Other Topics Concern     Parent/sibling w/ CABG, MI or angioplasty before 65F 55M? No   Social History Narrative     None         ROS:   Constitutional: No fever, chills, or sweats. No weight gain/loss.   ENT: No visual disturbance, ear ache, epistaxis, sore throat.   Allergies/Immunologic: Negative.   Respiratory: No cough, hemoptysis.   Cardiovascular: As per HPI.   GI: No nausea, vomiting, hematemesis, melena, or hematochezia.   : No urinary frequency, dysuria, or hematuria.   Integument: Negative.   Psychiatric: Negative.   Neuro: Negative.   Endocrinology: Negative.   Musculoskeletal: Negative.    EXAM:  BP (!) 80/0 (BP Location: Right arm, Patient Position: Chair, Cuff Size: Adult Regular)   Temp 98.7  F (37.1  C)   Ht 1.727 m (5' 8\")   Wt 114.2 kg (251 lb 11.2 oz)   SpO2 98%   BMI 38.27 kg/m    Wt Readings from Last 3 " Encounters:   06/24/20 114.2 kg (251 lb 11.2 oz)   05/19/20 113.9 kg (251 lb)   05/01/20 111.6 kg (246 lb)     General: appears comfortable, alert and articulate  Head: normocephalic, atraumatic  Eyes: anicteric sclera, EOMI  Neck: no adenopathy  Orophyarynx: moist mucosa, no lesions, dentition intact  Heart: regular, mechanical hum consistent with pump, no murmur, gallop, rub  Lungs: clear, no rales or wheezing  Abdomen: soft, non-tender, bowel sounds present, no hepatosplenomegaly  Extremities: no clubbing, cyanosis or edema  Neurological: normal speech and affect, no gross motor deficits    VAD Interrogation on 6-:  VAD interrogation reviewed with VAD coordinator.  Agree with findings.  No power spikes, speed drops, PI events or other findings suspicious of pump malfunction noted.      Driveline assessment: No drainage--wrapped driveline ext fx.     LABS:  CBC RESULTS:  Lab Results   Component Value Date    WBC 8.6 06/24/2020    RBC 4.64 06/24/2020    HGB 11.9 (L) 06/24/2020    HCT 38.4 (L) 06/24/2020    MCV 83 06/24/2020    MCH 25.6 (L) 06/24/2020    MCHC 31.0 (L) 06/24/2020    RDW 17.7 (H) 06/24/2020     06/24/2020       CMP RESULTS:  Lab Results   Component Value Date     (L) 06/24/2020    POTASSIUM 4.8 06/24/2020    CHLORIDE 98 06/24/2020    CO2 25 06/24/2020    ANIONGAP 7 06/24/2020     (H) 06/24/2020    BUN 43 (H) 06/24/2020    CR 1.68 (H) 06/24/2020    GFRESTIMATED 43 (L) 06/24/2020    GFRESTBLACK 49 (L) 06/24/2020    QAMAR 8.6 06/24/2020    BILITOTAL 0.7 06/24/2020    ALBUMIN 3.9 06/24/2020    ALKPHOS 79 06/24/2020    ALT 27 06/24/2020    AST 21 06/24/2020        Lab Results   Component Value Date    INR 2.63 (H) 06/24/2020     Lab Results   Component Value Date    PHGB <30 07/06/2018     Lab Results   Component Value Date     (H) 06/24/2020       Lab Results   Component Value Date    MAG 1.9 05/11/2020     Lab Results   Component Value Date    NTBNPI 4,216 (H) 06/15/2017     NTBNP 866 (H) 07/31/2019       Diagnostic Studies:  No results found for this or any previous visit (from the past 4320 hour(s)).    Assessment and Plan:  Pt with advanced heart failure and HM3 placement 3 yrs ago now with hyponatremia, mildly increase in Cr in setting of RV dysfn and increase in PI.  Will advance diuretics from bumex 2/2 to 3/2 X2d and if no change then 3/3 X2 days with increased K as discussed (increase form 20mEQ BID to adding 10mEq KCL for every 1 mg bumex increased).  Counseled pt to limit fluid intake to 1.5 l and limit takout food due to increase salt.  Will recheck BMP in one week.  Will not decrease BB at this time until we mobile more fluid.  Recognize the need to decrease BB with RV dysfn.  Will have pt RTC or have virtual visit in 2 wks.      Karthik Nguyen MD, PhD  Professor, Heart Failure and Cardiac Transplantation  [facility] at [place]  Mechanical Circulatory Support Program  VAD Coordinator (24/7): [phone], option 4 and ask for job code [phone]  Office: [phone]  FAX: [phone]  Email: [email or web address]     CC  Patient Care Team:  Jovany Salas MD as PCP - General (Internal Medicine)  Elfego Hayden MD as MD (Internal Medicine)  Delisa Montgomery MD as Referring Physician (Cardiology)  Rajani Abdi, RN as Registered Nurse  SELF, REFERRED

## 2020-06-24 NOTE — LETTER
6/24/2020    RE: Jim Willingham  [address] [facility] [address]       Dear Colleague,    Thank you for the opportunity to participate in the care of your patient, Jim Willingham, at the [facility] HEART [facility]. Please see a copy of my visit note below.    [facility] PHYSICIANS HEART VAD CLINIC NOTE  Chief Complaint:  Heart Failure follow-up  HPI: 64 yo Male with advanced heart failure S/P HM3 placement approx 3 yrs ago, CKD, DM, CECILIA on CPAP, AF with cardioversion and ICD discharge X2 now on amiodarone load.  Pt reports increase of 3-5 lb secondary to dietary issues, increased fluid intake with corresponding increase of PI.  Denies new SOB, RESENDIZ, bipedal edema, palpitations, orthopnea, PND, fevers, chills, NS, driveline drainage or change in power.  Reports compliance of all meds.     PAST MEDICAL HISTORY:  Past Medical History:   Diagnosis Date     Benign essential hypertension 5/11/2017     Cardiomyopathy, unspecified (H) 5/8/2017     CKD (chronic kidney disease) stage 3, GFR 30-59 ml/min (H) 5/11/2017     Depression 5/11/2017     Diabetes mellitus (H) 5/11/2017     H/O gastric bypass 5/11/2017     ICD (implantable cardioverter-defibrillator), biventricular, in situ 5/11/2017     LVAD (left ventricular assist device) present (H)      NICM (nonischemic cardiomyopathy) (H)/ EF 20% 5/11/2017    ECHO: LVEDd. 7.66 cm, Restrictive pattern , Severe mitral valve regurgitation     CECILIA (obstructive sleep apnea) 5/11/2017     Paroxysmal atrial fibrillation (H) 5/11/2017     Paroxysmal VT (H) 5/11/2017     Uncomplicated asthma      Vitamin B12 deficiency (non anemic) 5/11/2017       CURRENT MEDICATIONS:  Current Outpatient Medications   Medication Sig Dispense Refill     acetaminophen (TYLENOL) 325 MG tablet Take 650 mg by mouth every 6 hours as needed for mild pain       albuterol (ALBUTEROL) 108 (90 BASE) MCG/ACT Inhaler Inhale 2 puffs into the lungs every  4 hours as needed for shortness of breath / dyspnea or wheezing       allopurinol (ZYLOPRIM) 100 MG tablet Take 1 tablet (100 mg) by mouth daily 60 tablet 5     amiodarone (PACERONE) 400 MG tablet Take 1 tablet (400 mg) by mouth 2 times daily 14 tablet 0     aspirin 81 MG chewable tablet 1 tablet (81 mg) by Oral or Feeding Tube route daily 36 tablet      blood glucose monitoring (ONE TOUCH ULTRA 2) meter device kit Use to test blood sugar four times daily or as directed. 1 kit 0     blood glucose VI test strip Use to test blood sugar four times daily or as directed. 200 each 0     bumetanide (BUMEX) 1 MG tablet Take 2 tablets (2 mg) by mouth 2 times daily 120 tablet 11     citalopram (CELEXA) 20 MG tablet Take 20 mg by mouth daily       cyanocobalamin (VITAMIN B12) 1000 MCG/ML injection Inject 1,000 mcg into the muscle every 30 days       fluticasone-vilanterol (BREO ELLIPTA) 200-25 MCG/INH oral inhaler Inhale 1 puff into the lungs daily       gabapentin (NEURONTIN) 300 MG capsule Take 1 capsule (300 mg) by mouth twice daily and 2 capsules (600 mg) by mouth at bedtime daily.       insulin lispro (HUMALOG KWIKPEN) 100 UNIT/ML (1 unit dial) pen Inject 1-7 Units Subcutaneous 4 times daily 3 mL 1     insulin pen needle (32G X 4 MM) 32G X 4 MM miscellaneous Use four pen needles daily or as directed. 110 each 0     levalbuterol (XOPENEX) 1.25 MG/3ML neb solution Take 3 mLs (1.25 mg) by nebulization every 6 hours as needed for shortness of breath / dyspnea or wheezing 30 mL 0     losartan (COZAAR) 25 MG tablet Take 12.5 mg by mouth 2 times daily 60 tablet 0     metFORMIN (GLUCOPHAGE) 500 MG tablet Take 1 tablet (500 mg) by mouth 2 times daily (with meals) 60 tablet 0     metoprolol succinate ER (TOPROL-XL) 25 MG 24 hr tablet Take 1 tablet (25 mg) by mouth 2 times daily 270 tablet 2     montelukast (SINGULAIR) 10 MG tablet Take 10 mg by mouth At Bedtime       pantoprazole (PROTONIX) 40 MG EC tablet Take 1 tablet (40 mg) by  mouth every morning 60 tablet 5     potassium chloride ER (K-TAB/KLOR-CON) 10 MEQ CR tablet Take 2 tablets (20 mEq) by mouth daily 120 tablet 5     predniSONE (DELTASONE) 20 MG tablet Take 1 tablet (20 mg) by mouth daily 14 tablet 0     spironolactone (ALDACTONE) 25 MG tablet Take 25 mg by mouth 2 times daily       traMADol (ULTRAM) 50 MG tablet Take 2 tablets (100 mg) by mouth every 6 hours as needed for moderate pain or breakthrough pain 28 tablet      umeclidinium (INCRUSE ELLIPTA) 62.5 MCG/INH oral inhaler Inhale 1 puff into the lungs daily       warfarin (COUMADIN) 5 MG tablet Take 5 - 7.5mg daily or as directed by coumadin clinic. 90 tablet 5     [START ON 6/29/2020] amiodarone (PACERONE) 400 MG tablet Take 1 tablet (400 mg) by mouth daily (Patient not taking: Reported on 6/24/2020) 30 tablet 11     zinc sulfate (ZINCATE) 220 (50 ZN) MG capsule Take 220 mg by mouth 2 times daily         PAST SURGICAL HISTORY:  Past Surgical History:   Procedure Laterality Date     ANESTHESIA CARDIOVERSION N/A 5/11/2020    Procedure: ANESTHESIA, FOR CARDIOVERSION @1100;  Surgeon: GENERIC ANESTHESIA PROVIDER;  Location:  OR     CV RIGHT HEART CATH N/A 7/24/2019    Procedure: CV RIGHT HEART CATH;  Surgeon: Renu Sears MD;  Location:  HEART CARDIAC CATH LAB     GI SURGERY  2003    [facility]     INSERT VENTRICULAR ASSIST DEVICE LEFT (HEARTMATE II) N/A 6/19/2017    Procedure: INSERT VENTRICULAR ASSIST DEVICE LEFT (HEARTMATE II);  Median Sternotomy Heartmate II Left Ventricular Assist Device Insertion on Pump Oxygenator;  Surgeon: Ronnie Quigley MD;  Location:  OR     ORTHOPEDIC SURGERY  1994    right knee wired       ALLERGIES     Allergies   Allergen Reactions     Beer Shortness Of Breath     Grass Shortness Of Breath     Ace Inhibitors Unknown     Dust Mites Other (See Comments)     Asthma     Mold Other (See Comments)     Asthma     Penicillins      Sulfa Drugs Unknown and Other (See Comments)     PN: unknown        FAMILY HISTORY:  Family History   Problem Relation Age of Onset     Cerebrovascular Disease Mother      Diabetes Mother      Hypertension Mother      Coronary Artery Disease Father      Diabetes Type 2  Father        SOCIAL HISTORY:  Social History     Socioeconomic History     Marital status:      Spouse name: None     Number of children: None     Years of education: None     Highest education level: None   Occupational History     None   Social Needs     Financial resource strain: None     Food insecurity     Worry: None     Inability: None     Transportation needs     Medical: None     Non-medical: None   Tobacco Use     Smoking status: Former Smoker     Last attempt to quit:      Years since quittin.4     Smokeless tobacco: Never Used   Substance and Sexual Activity     Alcohol use: No     Drug use: No     Sexual activity: Yes     Partners: Female   Lifestyle     Physical activity     Days per week: None     Minutes per session: None     Stress: None   Relationships     Social connections     Talks on phone: None     Gets together: None     Attends Latter day service: None     Active member of club or organization: None     Attends meetings of clubs or organizations: None     Relationship status: None     Intimate partner violence     Fear of current or ex partner: None     Emotionally abused: None     Physically abused: None     Forced sexual activity: None   Other Topics Concern     Parent/sibling w/ CABG, MI or angioplasty before 65F 55M? No   Social History Narrative     None         ROS:   Constitutional: No fever, chills, or sweats. No weight gain/loss.   ENT: No visual disturbance, ear ache, epistaxis, sore throat.   Allergies/Immunologic: Negative.   Respiratory: No cough, hemoptysis.   Cardiovascular: As per HPI.   GI: No nausea, vomiting, hematemesis, melena, or hematochezia.   : No urinary frequency, dysuria, or hematuria.   Integument: Negative.   Psychiatric: Negative.  "  Neuro: Negative.   Endocrinology: Negative.   Musculoskeletal: Negative.    EXAM:  BP (!) 80/0 (BP Location: Right arm, Patient Position: Chair, Cuff Size: Adult Regular)   Temp 98.7  F (37.1  C)   Ht 1.727 m (5' 8\")   Wt 114.2 kg (251 lb 11.2 oz)   SpO2 98%   BMI 38.27 kg/m    Wt Readings from Last 3 Encounters:   06/24/20 114.2 kg (251 lb 11.2 oz)   05/19/20 113.9 kg (251 lb)   05/01/20 111.6 kg (246 lb)     General: appears comfortable, alert and articulate  Head: normocephalic, atraumatic  Eyes: anicteric sclera, EOMI  Neck: no adenopathy  Orophyarynx: moist mucosa, no lesions, dentition intact  Heart: regular, mechanical hum consistent with pump, no murmur, gallop, rub  Lungs: clear, no rales or wheezing  Abdomen: soft, non-tender, bowel sounds present, no hepatosplenomegaly  Extremities: no clubbing, cyanosis or edema  Neurological: normal speech and affect, no gross motor deficits    VAD Interrogation on 6-:  VAD interrogation reviewed with VAD coordinator.  Agree with findings.  No power spikes, speed drops, PI events or other findings suspicious of pump malfunction noted.      Driveline assessment: No drainage--wrapped driveline ext fx.     LABS:  CBC RESULTS:  Lab Results   Component Value Date    WBC 8.6 06/24/2020    RBC 4.64 06/24/2020    HGB 11.9 (L) 06/24/2020    HCT 38.4 (L) 06/24/2020    MCV 83 06/24/2020    MCH 25.6 (L) 06/24/2020    MCHC 31.0 (L) 06/24/2020    RDW 17.7 (H) 06/24/2020     06/24/2020     CMP RESULTS:  Lab Results   Component Value Date     (L) 06/24/2020    POTASSIUM 4.8 06/24/2020    CHLORIDE 98 06/24/2020    CO2 25 06/24/2020    ANIONGAP 7 06/24/2020     (H) 06/24/2020    BUN 43 (H) 06/24/2020    CR 1.68 (H) 06/24/2020    GFRESTIMATED 43 (L) 06/24/2020    GFRESTBLACK 49 (L) 06/24/2020    QAMAR 8.6 06/24/2020    BILITOTAL 0.7 06/24/2020    ALBUMIN 3.9 06/24/2020    ALKPHOS 79 06/24/2020    ALT 27 06/24/2020    AST 21 06/24/2020      Lab Results "   Component Value Date    INR 2.63 (H) 06/24/2020     Lab Results   Component Value Date    PHGB <30 07/06/2018     Lab Results   Component Value Date     (H) 06/24/2020     Lab Results   Component Value Date    MAG 1.9 05/11/2020     Lab Results   Component Value Date    NTBNPI 4,216 (H) 06/15/2017    NTBNP 866 (H) 07/31/2019     Diagnostic Studies:  No results found for this or any previous visit (from the past 4320 hour(s)).    Assessment and Plan:  Pt with advanced heart failure and HM3 placement 3 yrs ago now with hyponatremia, mildly increase in Cr in setting of RV dysfn and increase in PI.  Will advance diuretics from bumex 2/2 to 3/2 X2d and if no change then 3/3 X2 days with increased K as discussed (increase form 20mEQ BID to adding 10mEq KCL for every 1 mg bumex increased).  Counseled pt to limit fluid intake to 1.5 l and limit takout food due to increase salt.  Will recheck BMP in one week.  Will not decrease BB at this time until we mobile more fluid.  Recognize the need to decrease BB with RV dysfn.  Will have pt RTC or have virtual visit in 2 wks.      Karthik Nguyen MD, PhD  Professor, Heart Failure and Cardiac Transplantation  [facility] at [place]  Mechanical Circulatory Support Program  VAD Coordinator (24/7): [phone], option 4 and ask for job code [phone]  Office: [phone]  FAX: [phone]  Email: [email or web address]     CC  Patient Care Team:  Jovany Salas MD as PCP - General (Internal Medicine)  Elfego Hayden MD as MD (Internal Medicine)  Delisa Montgomery MD as Referring Physician (Cardiology)  Rajani Abdi, RN as Registered Nurse  SELF, REFERRED

## 2020-06-24 NOTE — NURSING NOTE
1). PUMP DATA  Primary controller serial number: [id number]     II:   Flow: 5.8 L/min,    Speed: 9400 RPMs,     PI: 5.8 ,  Power: 6.2 Manuel,      Primary controller   Back up battery: Patient use: 36, Replace in: 21  Months     Data downloaded: No   Equipment and driveline assessed for damage: Yes - DL has small break near controller. Rescue tape applied; emailed engineers for [employer]    Back up : Serial number: [id number]  Back up battery: Patient use: 4 Replace in: 24  Months  Programmed settings identical to the settings on the primary controller :Yes      Education complete: Yes   Charge the BACKUP controller s backup battery every 6 months  Perform a self test on BACKUP every 6 months  Change the MPU s batteries every 6 months:Yes  Have equipment serviced yearly (if applicable):Yes        2). ALARMS  Alarms reported by patient since last pump evaluation: No  Alarms or other finding noted during pump data history and alarm download: Yes, PI events with rare speed drop    Action Taken:  Reviewed data with patient: Yes      3). DRESSING CHANGE / DRIVELINE ASSESSMENT  Dressing change completed today: No  Appearance of Driveline site: WNL, weekly dressing, patient denies pain redness tenderness    Driveline stabilization: Method: [employer]  [ Teaching reinforced on need for stabilization of Driveline. ]

## 2020-06-25 DIAGNOSIS — I50.22 CHRONIC SYSTOLIC CONGESTIVE HEART FAILURE (H): Primary | ICD-10-CM

## 2020-06-25 LAB — INTERPRETATION ECG - MUSE: NORMAL

## 2020-06-26 DIAGNOSIS — K21.9 GASTROESOPHAGEAL REFLUX DISEASE WITHOUT ESOPHAGITIS: ICD-10-CM

## 2020-06-26 DIAGNOSIS — I50.22 CHRONIC SYSTOLIC CONGESTIVE HEART FAILURE (H): Primary | ICD-10-CM

## 2020-06-26 RX ORDER — AMIODARONE HYDROCHLORIDE 200 MG/1
TABLET ORAL
Qty: 28 TABLET | OUTPATIENT
Start: 2020-06-26

## 2020-06-29 ENCOUNTER — TELEPHONE (OUTPATIENT)
Dept: ANTICOAGULATION | Facility: CLINIC | Age: 65
End: 2020-06-29

## 2020-06-29 DIAGNOSIS — I50.22 CHRONIC SYSTOLIC CONGESTIVE HEART FAILURE (H): ICD-10-CM

## 2020-06-29 DIAGNOSIS — Z95.811 LVAD (LEFT VENTRICULAR ASSIST DEVICE) PRESENT (H): ICD-10-CM

## 2020-06-29 DIAGNOSIS — Z79.01 LONG TERM CURRENT USE OF ANTICOAGULANT THERAPY: ICD-10-CM

## 2020-06-29 NOTE — TELEPHONE ENCOUNTER
pantoprazole (PROTONIX) 40 MG EC  Last Written Prescription Date:  6/27/20  Last Fill Quantity: 60,   # refills: 5  Last Office Visit : 6/24/20  Future Office visit:  7/16/20  Routing refill request to provider for review/approval because:  Drug not on the refill protocol

## 2020-06-30 ENCOUNTER — ANTICOAGULATION THERAPY VISIT (OUTPATIENT)
Dept: ANTICOAGULATION | Facility: CLINIC | Age: 65
End: 2020-06-30

## 2020-06-30 DIAGNOSIS — I50.22 CHRONIC SYSTOLIC CONGESTIVE HEART FAILURE (H): ICD-10-CM

## 2020-06-30 DIAGNOSIS — Z95.811 LVAD (LEFT VENTRICULAR ASSIST DEVICE) PRESENT (H): ICD-10-CM

## 2020-06-30 DIAGNOSIS — Z79.01 LONG TERM CURRENT USE OF ANTICOAGULANT THERAPY: ICD-10-CM

## 2020-06-30 LAB
ALBUMIN SERPL-MCNC: 4.2 G/DL (ref 3.4–5)
ALP SERPL-CCNC: 81 U/L (ref 40–150)
ALT SERPL W P-5'-P-CCNC: 31 U/L (ref 0–70)
ANION GAP SERPL CALCULATED.3IONS-SCNC: 9 MMOL/L (ref 3–14)
AST SERPL W P-5'-P-CCNC: 22 U/L (ref 0–45)
BILIRUB SERPL-MCNC: 0.7 MG/DL (ref 0.2–1.3)
BUN SERPL-MCNC: 68 MG/DL (ref 7–30)
CALCIUM SERPL-MCNC: 8.9 MG/DL (ref 8.5–10.1)
CHLORIDE SERPL-SCNC: 97 MMOL/L (ref 94–109)
CO2 SERPL-SCNC: 27 MMOL/L (ref 20–32)
CREAT SERPL-MCNC: 2.4 MG/DL (ref 0.66–1.25)
ERYTHROCYTE [DISTWIDTH] IN BLOOD BY AUTOMATED COUNT: 17.8 % (ref 10–15)
GFR SERPL CREATININE-BSD FRML MDRD: 28 ML/MIN/{1.73_M2}
GLUCOSE SERPL-MCNC: 159 MG/DL (ref 70–99)
HCT VFR BLD AUTO: 39 % (ref 40–53)
HGB BLD-MCNC: 12.3 G/DL (ref 13.3–17.7)
INR PPP: 2.97 (ref 0.86–1.14)
LDH SERPL L TO P-CCNC: 479 U/L (ref 85–227)
MCH RBC QN AUTO: 25.9 PG (ref 26.5–33)
MCHC RBC AUTO-ENTMCNC: 31.5 G/DL (ref 31.5–36.5)
MCV RBC AUTO: 82 FL (ref 78–100)
PLATELET # BLD AUTO: 264 10E9/L (ref 150–450)
POTASSIUM SERPL-SCNC: 4.6 MMOL/L (ref 3.4–5.3)
PROT SERPL-MCNC: 7.6 G/DL (ref 6.8–8.8)
RBC # BLD AUTO: 4.74 10E12/L (ref 4.4–5.9)
SODIUM SERPL-SCNC: 133 MMOL/L (ref 133–144)
WBC # BLD AUTO: 9.4 10E9/L (ref 4–11)

## 2020-06-30 PROCEDURE — 36415 COLL VENOUS BLD VENIPUNCTURE: CPT | Performed by: INTERNAL MEDICINE

## 2020-06-30 PROCEDURE — 85610 PROTHROMBIN TIME: CPT | Performed by: INTERNAL MEDICINE

## 2020-06-30 PROCEDURE — 85027 COMPLETE CBC AUTOMATED: CPT | Performed by: INTERNAL MEDICINE

## 2020-06-30 PROCEDURE — 80053 COMPREHEN METABOLIC PANEL: CPT | Performed by: INTERNAL MEDICINE

## 2020-06-30 PROCEDURE — 83615 LACTATE (LD) (LDH) ENZYME: CPT | Performed by: INTERNAL MEDICINE

## 2020-06-30 RX ORDER — PANTOPRAZOLE SODIUM 40 MG/1
40 TABLET, DELAYED RELEASE ORAL EVERY MORNING
Qty: 60 TABLET | Refills: 5 | Status: SHIPPED | OUTPATIENT
Start: 2020-06-30 | End: 2021-01-21

## 2020-06-30 NOTE — PROGRESS NOTES
ANTICOAGULATION FOLLOW-UP CLINIC VISIT    Patient Name:  Jim Willingham  Date:  2020  Contact Type:  Telephone    SUBJECTIVE:  Patient Findings     Positives:   Change in medications (dose decrease on amiodarone to 400mg daily.)             OBJECTIVE    Recent labs: (last 7 days)     20  1029   INR 2.97*       ASSESSMENT / PLAN  INR assessment THER    Recheck INR In: 6 DAYS    INR Location Clinic      Anticoagulation Summary  As of 2020    INR goal:   2.0-3.0   TTR:   61.4 % (11.7 mo)   INR used for dosin.97 (2020)   Warfarin maintenance plan:   7.5 mg (5 mg x 1.5) every day   Full warfarin instructions:   : 5 mg; : 5 mg; Otherwise 7.5 mg every day   Weekly warfarin total:   52.5 mg   Plan last modified:   Delisa Hillman RN (2020)   Next INR check:   2020   Priority:   Critical   Target end date:   Indefinite    Indications    LVAD (left ventricular assist device) present (H) [Z95.811]  Long-term (current) use of anticoagulants [Z79.01] [Z79.01]  Chronic systolic congestive heart failure (H) [I50.22]             Anticoagulation Episode Summary     INR check location:       Preferred lab:       Send INR reminders to:   [facility]    Comments:   HIPPA Forms mailed 17  Labs drawn either at [facility] or [facility], See 3/5/20 ADDENDUM, pt. weekly INR >2.0 for Cardioversion  LVAD placed 17   II  ASA 81 mg daily      Anticoagulation Care Providers     Provider Role Specialty Phone number    Delisa Montgomery MD Referring Cardiology [phone]            See the Encounter Report to view Anticoagulation Flowsheet and Dosing Calendar (Go to Encounters tab in chart review, and find the Anticoagulation Therapy Visit)  Spoke with patient.  Patient had LVAD placed on:   17  Type of LVAD: HM2  Patient's current Aspirin dose: 81mg  LVAD Protocol followed:  Yes  Discussed with Pharmacist Jae Bedolla [facility] for patient's new Anticoagulation  recommendation.    Delisa Hillman RN

## 2020-07-01 ENCOUNTER — CARE COORDINATION (OUTPATIENT)
Dept: CARDIOLOGY | Facility: CLINIC | Age: 65
End: 2020-07-01

## 2020-07-01 ENCOUNTER — ALLIED HEALTH/NURSE VISIT (OUTPATIENT)
Dept: CARDIOLOGY | Facility: CLINIC | Age: 65
End: 2020-07-01
Attending: INTERNAL MEDICINE
Payer: COMMERCIAL

## 2020-07-01 DIAGNOSIS — I50.22 CHRONIC SYSTOLIC CONGESTIVE HEART FAILURE (H): Primary | ICD-10-CM

## 2020-07-01 PROCEDURE — 99207 ZZC NO CHARGE NURSE ONLY: CPT | Mod: ZP

## 2020-07-01 NOTE — NURSING NOTE
DRESSING CHANGE / DRIVELINE ASSESSMENT  Dressing change completed today: No  Appearance of Driveline site: WNL per patient    Driveline stabilization: Method: [employer]  [ Teaching reinforced on need for stabilization of Driveline. ]    Patient returned to clinic after having his drive line repaired with rescue tape last week to be repaired with a clamshell covering with industry rep. Drive line and equipment inspected, and no other damage noted. Drive line stabilized with clam shell. Patient encouraged not to shower for 24 hours to allow adhesive to set.

## 2020-07-01 NOTE — PROGRESS NOTES
Pt had labs drawn yesterday to follow-up on changes to diuretics made last week. Noted significant increase in creatinine, from 1.68 to 2.4. Called pt to discuss how he is feeling. Pt reports feeling well. His weight today is 239lb (from 244lb last week when in clinic). He took bumex 3/2 on Thurs and Fri last week, did not see a change in weight or PI, so took bumex 3/3 on Saturday. He returned to his dosing of bumex 2mg BID on Sunday when PI's returned to 5's and weight decreased to 240.   Discussed results with Dr. Montgomery. Plan to continue bumex at 2mg BID and recheck bmp in 1 week. Called pt to review plan, pt verbalized understanding. He will have bmp checked on 7/6.   Pt has a follow-up virtual visit on 7/16 with Melisa Butler.

## 2020-07-06 ENCOUNTER — TELEPHONE (OUTPATIENT)
Dept: ANTICOAGULATION | Facility: CLINIC | Age: 65
End: 2020-07-06

## 2020-07-06 DIAGNOSIS — I50.22 CHRONIC SYSTOLIC CONGESTIVE HEART FAILURE (H): ICD-10-CM

## 2020-07-06 DIAGNOSIS — Z95.811 LVAD (LEFT VENTRICULAR ASSIST DEVICE) PRESENT (H): ICD-10-CM

## 2020-07-06 DIAGNOSIS — Z79.01 LONG TERM CURRENT USE OF ANTICOAGULANT THERAPY: ICD-10-CM

## 2020-07-06 NOTE — TELEPHONE ENCOUNTER
Spoke with Jim.  He is going to have his INR drawn tomorrow, 7/7/2020.  He will take 7.5 mg of warfarin tonight.  Kirsty Bermudez RN

## 2020-07-07 ENCOUNTER — ANTICOAGULATION THERAPY VISIT (OUTPATIENT)
Dept: ANTICOAGULATION | Facility: CLINIC | Age: 65
End: 2020-07-07

## 2020-07-07 DIAGNOSIS — Z79.01 LONG TERM CURRENT USE OF ANTICOAGULANT THERAPY: ICD-10-CM

## 2020-07-07 DIAGNOSIS — I50.22 CHRONIC SYSTOLIC CONGESTIVE HEART FAILURE (H): ICD-10-CM

## 2020-07-07 DIAGNOSIS — Z95.811 LVAD (LEFT VENTRICULAR ASSIST DEVICE) PRESENT (H): ICD-10-CM

## 2020-07-07 LAB
ALBUMIN SERPL-MCNC: 4.3 G/DL (ref 3.4–5)
ALP SERPL-CCNC: 72 U/L (ref 40–150)
ALT SERPL W P-5'-P-CCNC: 29 U/L (ref 0–70)
ANION GAP SERPL CALCULATED.3IONS-SCNC: 9 MMOL/L (ref 3–14)
AST SERPL W P-5'-P-CCNC: 20 U/L (ref 0–45)
BILIRUB SERPL-MCNC: 0.7 MG/DL (ref 0.2–1.3)
BUN SERPL-MCNC: 63 MG/DL (ref 7–30)
CALCIUM SERPL-MCNC: 9.1 MG/DL (ref 8.5–10.1)
CHLORIDE SERPL-SCNC: 98 MMOL/L (ref 94–109)
CO2 SERPL-SCNC: 26 MMOL/L (ref 20–32)
CREAT SERPL-MCNC: 2.12 MG/DL (ref 0.66–1.25)
ERYTHROCYTE [DISTWIDTH] IN BLOOD BY AUTOMATED COUNT: 17.8 % (ref 10–15)
GFR SERPL CREATININE-BSD FRML MDRD: 32 ML/MIN/{1.73_M2}
GLUCOSE SERPL-MCNC: 118 MG/DL (ref 70–99)
HCT VFR BLD AUTO: 41.3 % (ref 40–53)
HGB BLD-MCNC: 13.2 G/DL (ref 13.3–17.7)
INR PPP: 2.79 (ref 0.86–1.14)
LDH SERPL L TO P-CCNC: 434 U/L (ref 85–227)
MCH RBC QN AUTO: 26.3 PG (ref 26.5–33)
MCHC RBC AUTO-ENTMCNC: 32 G/DL (ref 31.5–36.5)
MCV RBC AUTO: 82 FL (ref 78–100)
PLATELET # BLD AUTO: 242 10E9/L (ref 150–450)
POTASSIUM SERPL-SCNC: 4.7 MMOL/L (ref 3.4–5.3)
PROT SERPL-MCNC: 7.9 G/DL (ref 6.8–8.8)
RBC # BLD AUTO: 5.02 10E12/L (ref 4.4–5.9)
SODIUM SERPL-SCNC: 133 MMOL/L (ref 133–144)
WBC # BLD AUTO: 9.9 10E9/L (ref 4–11)

## 2020-07-07 PROCEDURE — 36415 COLL VENOUS BLD VENIPUNCTURE: CPT | Performed by: INTERNAL MEDICINE

## 2020-07-07 PROCEDURE — 80053 COMPREHEN METABOLIC PANEL: CPT | Performed by: INTERNAL MEDICINE

## 2020-07-07 PROCEDURE — 85610 PROTHROMBIN TIME: CPT | Performed by: INTERNAL MEDICINE

## 2020-07-07 PROCEDURE — 85027 COMPLETE CBC AUTOMATED: CPT | Performed by: INTERNAL MEDICINE

## 2020-07-07 PROCEDURE — 83615 LACTATE (LD) (LDH) ENZYME: CPT | Performed by: INTERNAL MEDICINE

## 2020-07-07 NOTE — PROGRESS NOTES
ANTICOAGULATION FOLLOW-UP CLINIC VISIT    Patient Name:  Jim Willingham  Date:  2020  Contact Type:  Telephone    SUBJECTIVE:         OBJECTIVE    Recent labs: (last 7 days)     20  1408   INR 2.79*       ASSESSMENT / PLAN  INR assessment THER    Recheck INR In: 1 WEEK    INR Location Clinic      Anticoagulation Summary  As of 2020    INR goal:   2.0-3.0   TTR:   61.5 % (11.7 mo)   INR used for dosin.79 (2020)   Warfarin maintenance plan:   5 mg (5 mg x 1) every Fri; 7.5 mg (5 mg x 1.5) all other days   Full warfarin instructions:   7/10: 5 mg; Otherwise 5 mg every Fri; 7.5 mg all other days   Weekly warfarin total:   50 mg   Plan last modified:   Delisa Hillman RN (2020)   Next INR check:   2020   Priority:   Critical   Target end date:   Indefinite    Indications    LVAD (left ventricular assist device) present (H) [Z95.811]  Long-term (current) use of anticoagulants [Z79.01] [Z79.01]  Chronic systolic congestive heart failure (H) [I50.22]             Anticoagulation Episode Summary     INR check location:       Preferred lab:       Send INR reminders to:   [facility]    Comments:   HIPPA Forms mailed 17  Labs drawn either at [facility] or [facility], See 3/5/20 ADDENDUM, pt. weekly INR >2.0 for Cardioversion  LVAD placed 17   II  ASA 81 mg daily      Anticoagulation Care Providers     Provider Role Specialty Phone number    Delisa Montgomery MD Referring Cardiology [phone]            See the Encounter Report to view Anticoagulation Flowsheet and Dosing Calendar (Go to Encounters tab in chart review, and find the Anticoagulation Therapy Visit)  Spoke with patient.  Patient had LVAD placed on:   17  Type of LVAD: HM2  Patient's current Aspirin dose: 81mg  LVAD Protocol followed:  Yes  Delisa Hillman RN

## 2020-07-10 ENCOUNTER — CARE COORDINATION (OUTPATIENT)
Dept: CARDIOLOGY | Facility: CLINIC | Age: 65
End: 2020-07-10

## 2020-07-10 DIAGNOSIS — I50.22 CHRONIC SYSTOLIC CONGESTIVE HEART FAILURE (H): Primary | ICD-10-CM

## 2020-07-10 NOTE — PROGRESS NOTES
Pt had labs drawn 7/7 to follow-up on abnormal cr and med change from the previous week. Creatinine is improving, but still elevated. Called pt to check in on 7/7. Pt feels well. Weight is stable at 239lb. Discussed results with Soraya Marshall NP today. No changes necessary at this time. Pt has virtual visit scheduled for next week. He will get labs checked at that time.   Called pt today to relay plan. Pt verbalized understanding.

## 2020-07-14 ENCOUNTER — ANTICOAGULATION THERAPY VISIT (OUTPATIENT)
Dept: ANTICOAGULATION | Facility: CLINIC | Age: 65
End: 2020-07-14

## 2020-07-14 DIAGNOSIS — Z79.01 LONG TERM CURRENT USE OF ANTICOAGULANT THERAPY: ICD-10-CM

## 2020-07-14 DIAGNOSIS — Z95.811 LVAD (LEFT VENTRICULAR ASSIST DEVICE) PRESENT (H): ICD-10-CM

## 2020-07-14 DIAGNOSIS — I50.22 CHRONIC SYSTOLIC CONGESTIVE HEART FAILURE (H): ICD-10-CM

## 2020-07-14 LAB
ALBUMIN SERPL-MCNC: 4.1 G/DL (ref 3.4–5)
ALP SERPL-CCNC: 66 U/L (ref 40–150)
ALT SERPL W P-5'-P-CCNC: 29 U/L (ref 0–70)
ANION GAP SERPL CALCULATED.3IONS-SCNC: 9 MMOL/L (ref 3–14)
AST SERPL W P-5'-P-CCNC: 22 U/L (ref 0–45)
BILIRUB SERPL-MCNC: 0.7 MG/DL (ref 0.2–1.3)
BUN SERPL-MCNC: 61 MG/DL (ref 7–30)
CALCIUM SERPL-MCNC: 8.6 MG/DL (ref 8.5–10.1)
CHLORIDE SERPL-SCNC: 98 MMOL/L (ref 94–109)
CO2 SERPL-SCNC: 26 MMOL/L (ref 20–32)
CREAT SERPL-MCNC: 2.02 MG/DL (ref 0.66–1.25)
ERYTHROCYTE [DISTWIDTH] IN BLOOD BY AUTOMATED COUNT: 18.2 % (ref 10–15)
GFR SERPL CREATININE-BSD FRML MDRD: 34 ML/MIN/{1.73_M2}
GLUCOSE SERPL-MCNC: 98 MG/DL (ref 70–99)
HCT VFR BLD AUTO: 39.7 % (ref 40–53)
HGB BLD-MCNC: 12.7 G/DL (ref 13.3–17.7)
INR PPP: 3.57 (ref 0.86–1.14)
LDH SERPL L TO P-CCNC: 446 U/L (ref 85–227)
MCH RBC QN AUTO: 26.6 PG (ref 26.5–33)
MCHC RBC AUTO-ENTMCNC: 32 G/DL (ref 31.5–36.5)
MCV RBC AUTO: 83 FL (ref 78–100)
PLATELET # BLD AUTO: 196 10E9/L (ref 150–450)
POTASSIUM SERPL-SCNC: 4.2 MMOL/L (ref 3.4–5.3)
PROT SERPL-MCNC: 7.3 G/DL (ref 6.8–8.8)
RBC # BLD AUTO: 4.77 10E12/L (ref 4.4–5.9)
SODIUM SERPL-SCNC: 133 MMOL/L (ref 133–144)
WBC # BLD AUTO: 11 10E9/L (ref 4–11)

## 2020-07-14 PROCEDURE — 36415 COLL VENOUS BLD VENIPUNCTURE: CPT | Performed by: PHYSICIAN ASSISTANT

## 2020-07-14 PROCEDURE — 83615 LACTATE (LD) (LDH) ENZYME: CPT | Performed by: PHYSICIAN ASSISTANT

## 2020-07-14 PROCEDURE — 80053 COMPREHEN METABOLIC PANEL: CPT | Performed by: PHYSICIAN ASSISTANT

## 2020-07-14 PROCEDURE — 85027 COMPLETE CBC AUTOMATED: CPT | Performed by: PHYSICIAN ASSISTANT

## 2020-07-14 PROCEDURE — 85610 PROTHROMBIN TIME: CPT | Performed by: PHYSICIAN ASSISTANT

## 2020-07-14 NOTE — PROGRESS NOTES
ANTICOAGULATION FOLLOW-UP CLINIC VISIT    Patient Name:  Jim Willingham  Date:  7/14/2020  Contact Type:  Telephone    SUBJECTIVE:         OBJECTIVE    Recent labs: (last 7 days)     07/14/20  1302   INR 3.57*       ASSESSMENT / PLAN  INR assessment SUPRA    Recheck INR In: 1 WEEK    INR Location Clinic      Anticoagulation Summary  As of 7/14/2020    INR goal:   2.0-3.0   TTR:   60.0 % (11.7 mo)   INR used for dosing:   3.57! (7/14/2020)   Warfarin maintenance plan:   5 mg (5 mg x 1) every Fri; 7.5 mg (5 mg x 1.5) all other days   Full warfarin instructions:   7/14: 2.5 mg; Otherwise 5 mg every Fri; 7.5 mg all other days   Weekly warfarin total:   50 mg   Plan last modified:   Delisa Hillman RN (7/7/2020)   Next INR check:   7/21/2020   Priority:   Critical   Target end date:   Indefinite    Indications    LVAD (left ventricular assist device) present (H) [Z95.811]  Long-term (current) use of anticoagulants [Z79.01] [Z79.01]  Chronic systolic congestive heart failure (H) [I50.22]             Anticoagulation Episode Summary     INR check location:       Preferred lab:       Send INR reminders to:   [facility]    Comments:   HIPPA Forms mailed 6/26/17  Labs drawn either at [facility] or [facility], See 3/5/20 ADDENDUM, pt. weekly INR >2.0 for Cardioversion  LVAD placed 6/19/17   II  ASA 81 mg daily      Anticoagulation Care Providers     Provider Role Specialty Phone number    Delisa Montgomery MD Referring Cardiology [phone]            See the Encounter Report to view Anticoagulation Flowsheet and Dosing Calendar (Go to Encounters tab in chart review, and find the Anticoagulation Therapy Visit)  Spoke with patient.  Patient had LVAD placed on:   6/9/17  Type of LVAD: HM2  Patient's current Aspirin dose: 81mg  LVAD Protocol followed:  Yes  Delisa Hillman RN

## 2020-07-15 ENCOUNTER — ANCILLARY PROCEDURE (OUTPATIENT)
Dept: CARDIOLOGY | Facility: CLINIC | Age: 65
End: 2020-07-15
Attending: INTERNAL MEDICINE
Payer: COMMERCIAL

## 2020-07-15 DIAGNOSIS — I48.0 PAROXYSMAL ATRIAL FIBRILLATION (H): ICD-10-CM

## 2020-07-15 DIAGNOSIS — Z95.810 ICD (IMPLANTABLE CARDIOVERTER-DEFIBRILLATOR), BIVENTRICULAR, IN SITU: ICD-10-CM

## 2020-07-15 DIAGNOSIS — I42.9 CARDIOMYOPATHY (H): ICD-10-CM

## 2020-07-15 DIAGNOSIS — I47.29 PAROXYSMAL VT (H): Primary | ICD-10-CM

## 2020-07-15 PROCEDURE — 93296 REM INTERROG EVL PM/IDS: CPT | Mod: ZF

## 2020-07-15 PROCEDURE — 99207 CARDIAC DEVICE CHECK - REMOTE: CPT | Performed by: INTERNAL MEDICINE

## 2020-07-16 ENCOUNTER — VIRTUAL VISIT (OUTPATIENT)
Dept: CARDIOLOGY | Facility: CLINIC | Age: 65
End: 2020-07-16
Attending: PHYSICIAN ASSISTANT
Payer: COMMERCIAL

## 2020-07-16 ENCOUNTER — CARE COORDINATION (OUTPATIENT)
Dept: CARDIOLOGY | Facility: CLINIC | Age: 65
End: 2020-07-16

## 2020-07-16 DIAGNOSIS — N18.30 CKD (CHRONIC KIDNEY DISEASE) STAGE 3, GFR 30-59 ML/MIN (H): ICD-10-CM

## 2020-07-16 DIAGNOSIS — I50.22 CHRONIC SYSTOLIC CONGESTIVE HEART FAILURE (H): Primary | ICD-10-CM

## 2020-07-16 DIAGNOSIS — Z95.811 LVAD (LEFT VENTRICULAR ASSIST DEVICE) PRESENT (H): ICD-10-CM

## 2020-07-16 DIAGNOSIS — I50.810 RVF (RIGHT VENTRICULAR FAILURE) (H): ICD-10-CM

## 2020-07-16 DIAGNOSIS — I48.0 PAROXYSMAL ATRIAL FIBRILLATION (H): ICD-10-CM

## 2020-07-16 PROCEDURE — 99214 OFFICE O/P EST MOD 30 MIN: CPT | Mod: TEL | Performed by: PHYSICIAN ASSISTANT

## 2020-07-16 ASSESSMENT — PAIN SCALES - GENERAL: PAINLEVEL: NO PAIN (0)

## 2020-07-16 NOTE — PROGRESS NOTES
Telephone visit  Switched as [facility] ([facility]) link wasn't working  Start time 9:11 AM  End time 9:36 AM  Provider location: [facility]  Patient location: home    HPI:   Jim Willingham is a 64 year old male with a past medical history of NICM (EF 20%) s/p HM II LVAD placement (6/19/17) at [facility] (obesity) Other relavant history includes a. Fib, chronic kidney disease stage III, diabetes mellitus type 2, RV failure, CECILIA, obesity, hypertension, COPD, asthma. He is here for heart failure and LVAD follow up.     Last visit: 6/24/2020 with Dr. Nguyen  Was seen after ICD shocks which were appropriate for fast VT which appropriately terminated VT and now was back in sinus. He had had an ICD shock the week prior as well. His diuretics were increased from 2/2 to 3/2 x2 days with plans to increase further to 3/3 if there was no improvement.    This visit  He is feeling much better. The dizzy/fatigue spells that he was having have almost totally gone away. His weight has been between 237-239. He has been gradually loosing. He has been intentionally loosing weight since Feb when he was 260. He is dieting. He isn't exercising more because of COVID. He has been going up and down the deck stairs and is feeling much better than a few months ago. He still gets RESENDIZ by the end but recovers very quickly. No SOB at rest. No orthopnea. No PNA. Wears CPAP. No LE edema or abdominal edema. No lightheadedness or dizziness. He had no prodrome for the ICD shocks.No chest pain.    He denies blood in the urine or blood in the stool. No prolonged nose bleeds.     No fever/chills. No driveline redness, drainage or pain.    No headaches or stroke symptoms.     He has not symptoms of driveline infection. However, is having problems with his caregiver/dressing changes. Fighting with his wife and she is not always willing to do the dressing changes. He will work on identifying another person who could help him with dressing changes when she is not available.    Cardiac  Medications  ASA 81 mg daily  Coumadin  Bumex 2 mg BID  Amiodarone 400 mg once a day  KCl 20 meq BID   Losartan 12.5 mg BID  Metoprolol 12.5 mg BID   Aldactone 25 mg BID    PAST MEDICAL HISTORY:  Past Medical History:   Diagnosis Date     Benign essential hypertension 2017     Cardiomyopathy, unspecified (H) 2017     CKD (chronic kidney disease) stage 3, GFR 30-59 ml/min (H) 2017     Depression 2017     Diabetes mellitus (H) 2017     H/O gastric bypass 2017     ICD (implantable cardioverter-defibrillator), biventricular, in situ 2017     LVAD (left ventricular assist device) present (H)      NICM (nonischemic cardiomyopathy) (H)/ EF 20% 2017    ECHO: LVEDd. 7.66 cm, Restrictive pattern , Severe mitral valve regurgitation     CECILIA (obstructive sleep apnea) 2017     Paroxysmal atrial fibrillation (H) 2017     Paroxysmal VT (H) 2017     Uncomplicated asthma      Vitamin B12 deficiency (non anemic) 2017       FAMILY HISTORY:  Family History   Problem Relation Age of Onset     Cerebrovascular Disease Mother      Diabetes Mother      Hypertension Mother      Coronary Artery Disease Father      Diabetes Type 2  Father        SOCIAL HISTORY:  Social History     Socioeconomic History     Marital status:      Spouse name: Not on file     Number of children: Not on file     Years of education: Not on file     Highest education level: Not on file   Occupational History     Not on file   Social Needs     Financial resource strain: Not on file     Food insecurity:     Worry: Not on file     Inability: Not on file     Transportation needs:     Medical: Not on file     Non-medical: Not on file   Tobacco Use     Smoking status: Former Smoker     Last attempt to quit:      Years since quittin.1     Smokeless tobacco: Never Used   Substance and Sexual Activity     Alcohol use: No     Drug use: No     Sexual activity: Yes     Partners: Female   Lifestyle      Physical activity:     Days per week: Not on file     Minutes per session: Not on file     Stress: Not on file   Relationships     Social connections:     Talks on phone: Not on file     Gets together: Not on file     Attends [employer] service: Not on file     Active member of club or organization: Not on file     Attends meetings of clubs or organizations: Not on file     Relationship status: Not on file     Intimate partner violence:     Fear of current or ex partner: Not on file     Emotionally abused: Not on file     Physically abused: Not on file     Forced sexual activity: Not on file   Other Topics Concern     Parent/sibling w/ CABG, MI or angioplasty before 65F 55M? No   Social History Narrative     Not on file       CURRENT MEDICATIONS:  acetaminophen (TYLENOL) 325 MG tablet, Take 650 mg by mouth every 6 hours as needed for mild pain  albuterol (ALBUTEROL) 108 (90 BASE) MCG/ACT Inhaler, Inhale 2 puffs into the lungs every 4 hours as needed for shortness of breath / dyspnea or wheezing  allopurinol (ZYLOPRIM) 100 MG tablet, Take 1 tablet (100 mg) by mouth daily  amiodarone (PACERONE) 400 MG tablet, Take 1 tablet (400 mg) by mouth 2 times daily  amiodarone (PACERONE) 400 MG tablet, Take 1 tablet (400 mg) by mouth daily  aspirin 81 MG chewable tablet, 1 tablet (81 mg) by Oral or Feeding Tube route daily  blood glucose monitoring (ONE TOUCH ULTRA 2) meter device kit, Use to test blood sugar four times daily or as directed.  blood glucose VI test strip, Use to test blood sugar four times daily or as directed.  bumetanide (BUMEX) 1 MG tablet, Take 2 tablets (2 mg) by mouth 2 times daily  citalopram (CELEXA) 20 MG tablet, Take 20 mg by mouth daily  cyanocobalamin (VITAMIN B12) 1000 MCG/ML injection, Inject 1,000 mcg into the muscle every 30 days  fluticasone-vilanterol (BREO ELLIPTA) 200-25 MCG/INH oral inhaler, Inhale 1 puff into the lungs daily  gabapentin (NEURONTIN) 300 MG capsule, Take 1 capsule (300 mg) by  mouth twice daily and 2 capsules (600 mg) by mouth at bedtime daily.  insulin lispro (HUMALOG KWIKPEN) 100 UNIT/ML (1 unit dial) pen, Inject 1-7 Units Subcutaneous 4 times daily  insulin pen needle (32G X 4 MM) 32G X 4 MM miscellaneous, Use four pen needles daily or as directed.  levalbuterol (XOPENEX) 1.25 MG/3ML neb solution, Take 3 mLs (1.25 mg) by nebulization every 6 hours as needed for shortness of breath / dyspnea or wheezing  losartan (COZAAR) 25 MG tablet, Take 12.5 mg by mouth 2 times daily  metFORMIN (GLUCOPHAGE) 500 MG tablet, Take 1 tablet (500 mg) by mouth 2 times daily (with meals)  metoprolol succinate ER (TOPROL-XL) 25 MG 24 hr tablet, Take 1 tablet (25 mg) by mouth 2 times daily  montelukast (SINGULAIR) 10 MG tablet, Take 10 mg by mouth At Bedtime  pantoprazole (PROTONIX) 40 MG EC tablet, Take 1 tablet (40 mg) by mouth every morning  potassium chloride ER (K-TAB/KLOR-CON) 10 MEQ CR tablet, Take 2 tablets (20 mEq) by mouth daily  predniSONE (DELTASONE) 20 MG tablet, Take 1 tablet (20 mg) by mouth daily  spironolactone (ALDACTONE) 25 MG tablet, Take 25 mg by mouth 2 times daily  traMADol (ULTRAM) 50 MG tablet, Take 2 tablets (100 mg) by mouth every 6 hours as needed for moderate pain or breakthrough pain  umeclidinium (INCRUSE ELLIPTA) 62.5 MCG/INH oral inhaler, Inhale 1 puff into the lungs daily  warfarin (COUMADIN) 5 MG tablet, Take 5 - 7.5mg daily or as directed by coumadin clinic.  zinc sulfate (ZINCATE) 220 (50 ZN) MG capsule, Take 220 mg by mouth 2 times daily    No current facility-administered medications on file prior to visit.       ROS:   See HPI    EXAM:  There were no vitals taken for this visit.    N/A phone visit      Labs - as reviewed in clinic with patient today:  CBC RESULTS:  Lab Results   Component Value Date    WBC 11.0 07/14/2020    RBC 4.77 07/14/2020    HGB 12.7 (L) 07/14/2020    HCT 39.7 (L) 07/14/2020    MCV 83 07/14/2020    MCH 26.6 07/14/2020    MCHC 32.0 07/14/2020     RDW 18.2 (H) 07/14/2020     07/14/2020       CMP RESULTS:  Lab Results   Component Value Date     07/14/2020    POTASSIUM 4.2 07/14/2020    CHLORIDE 98 07/14/2020    CO2 26 07/14/2020    ANIONGAP 9 07/14/2020    GLC 98 07/14/2020    BUN 61 (H) 07/14/2020    CR 2.02 (H) 07/14/2020    GFRESTIMATED 34 (L) 07/14/2020    GFRESTBLACK 39 (L) 07/14/2020    QAMAR 8.6 07/14/2020    BILITOTAL 0.7 07/14/2020    ALBUMIN 4.1 07/14/2020    ALKPHOS 66 07/14/2020    ALT 29 07/14/2020    AST 22 07/14/2020        INR RESULTS:  Lab Results   Component Value Date    INR 3.57 (H) 07/14/2020       Lab Results   Component Value Date    MAG 1.9 05/11/2020     Lab Results   Component Value Date    NTBNPI 4,216 (H) 06/15/2017     Lab Results   Component Value Date    NTBNP 866 (H) 07/31/2019           Diagnostics  7/15/2020 ICD check  In clinic device appointment conversion to remote device appointment to minimize COVID19 exposure for high risk patient populations.  Scheduled remote ICD transmission received and reviewed. Device transmission sent per MD orders. Patient has a Medtronic multi lead ICD programmed AAIR. Normal ICD function. No episodes recorded. No arrhythmias recorded. Presenting EGM = SR @ 70 bpm. AP = 0%.  = 0%. BVP = 0%. OptiVol fluid index is at baseline. Estimated battery longevity to MARVA = 6 months. Battery voltage = 2.88V. No short v-v intervals recorded. Lead impedance trends appear stable. Patient notified of interrogation results. Patient reports that he is feeling well and denies complaints. Plan for patient to send a remote transmission in 3 months as scheduled.  MIKY Feliz RN     Remote ICD transmission    1/20/2020 \[facility] [place] [facility]\""  Interpretation Summary  Technically difficult study. Patients heart rate is in the 120s-150s during  the study.  HM2 LVAD is present and is funcioning at 9400RPM     Severely (EF 10-15%) reduced left ventricular function is present. Severe left  ventricular dilation is present. LVIDd  is 6.6cm LVAD cannula in the apex is  not well visualized. Inflow and outflow velocities could not be obtained.  Aortic valve appears to open minimally with each beat. Septum is probably  midline but again difficult to visualize.  Global right ventricular function is moderately to severely reduced.  IVC diameter >2.1 cm collapsing <50% with sniff suggests a high RA pressure  estimated at 15 mmHg or greater. No pericardial effusion is present.     Compared to prior study on 1/16/20, LVIDd appears slightly larger. Patient is  severely tachycardic now. IVC appears more dilated.    7/24/2019 RHC   Time  Systolic  Diastolic  Mean  A Wave  V Wave  EDP  Max dp/dt  HR    RA Pressures  10:04 AM    10 mmHg     49799 mmHg     56885 mmHg       55 bpm       RV Pressures   9:42 AM        384 mmHg/sec         10:01 AM  25 mmHg         10 mmHg      39 bpm       PA Pressures  10:01 AM  25 mmHg     16 mmHg     19 mmHg         80 bpm       PCW Pressures  10:02 AM    11 mmHg     87378 mmHg     50699 mmHg       68 bpm          Time  TDCO  TDCI  Kee C.O.  Kee C.I.  Kee HR    Cardiac Output Results   9:42 AM  4.63 L/min     2.02 L/min/m2     4.62 L/min     2.01 L/min/m2            _____________________________________________________________________________    Assessment and Plan:   Jim Willingham is a 65 year old male with a past medical history of NICM (EF 20%) s/p HM II LVAD placement (6/19/17) at  (obesity) Other relavant history includes chronic kidney disease stage III, diabetes mellitus type 2, RV failure, CECILIA, obesity, hypertension, COPD, asthma. He presents for LVAD follow-up.    Renal function continues to improve although still above hsi baseline. Otherwise he is doing significantly better than he was earlier this year- dizziness/fatigue episodes have nearly completely resolved. Feeling much better.    #  Chronic systolic heart failure secondary to NICM  Stage D  NYHA Class IIIA  ACEi/ARB: Continue Losartan 12.5 mg BID, may  be able to increase now that he is s/p cardioversion  BB: Continue metoprolol 12.5 mg BID, dizziness has improved significantly with decreased metoprolol  Aldosterone antagonist: Spironolactone 25 mg   SCD prophylaxis: ICD  Fluid status:  Slightly hypovolemic- decrease bumex to 2/1 for 3 days and then return to 2 mg BID. No changes to Kcl dosing.     #  S/P LVAD implant as DT due to obesity. Goal is to loose weight to be eligible for transplant.  Anticoagulation: Warfarin with INR goal of 2-3,  dosing per coumadin clinic  Antiplatelet: Aspirin 81 mg daily  MAP: Goal 65-85 no reading today given virtual visit  LDH: Stable, 446, BL around 380-450  Other: Will identify a new person to train on drivelien dressings given his current problems with his wife  VAD Interrogation today:  NA a telephone visit. Per patient one low voltage alarm- instructed to clean his clips and let us know if there are any further alarms.    # RV failure  - Last RHC wedge was 11, RA was 17.  - Continue lower metoprolol dose given considerable improvement  - Working towards weight loss so that he can be evaluated/listed for transplant     #  obesity: BMI >40.  Recommend weight loss with a goal weight of 255.  Weight loss clinic referral has been placed previously.    # A. Fib  New in Jan. Now S/p Cardioversion in 5/2020. Symptoms improved dramatically. In sinus on ICD check today. No a. Fib episodes since cardioversion  - Continue BB and coumadin     # CECILIA: Continue CPAP.       # Chronic kidney disease with WALTER: Recent WALTER with peak of 2.40, continues to improve. 2.02 today, still above his baseline. BL in the past year has been 1.2-1.4  - Bumex decrease as above  - Recheck in 2 weeks    # Memory concerns  - Feels like his short term memory is poor. This is not new. No acute change  - I recommend that he see his PCP for these concerns. Contact us for acute changes as he would then need urgent imaging. No indications for this  currently.    Follow-up  - BMP in 2 weeks  - In person with Melisa in 1 month as scheduled    MICHEL Yeboah REBECCA JANE

## 2020-07-16 NOTE — LETTER
7/16/2020      RE: Jim Willingham  [address] [facility] [id number]       Dear Colleague,    Thank you for the opportunity to participate in the care of your patient, Jim Willingham, at the [facility] HEART [facility] at [facility]. Please see a copy of my visit note below.    Jim Willingham is a 65 year old male who is being evaluated via a billable video visit.        HPI:   Jim Willingham is a 64 year old male with a past medical history of NICM (EF 20%) s/p HM II LVAD placement (6/19/17) at  (obesity) Other relavant history includes a. Fib, chronic kidney disease stage III, diabetes mellitus type 2, RV failure, CECILIA, obesity, hypertension, COPD, asthma. He is here for heart failure and LVAD follow up.     Last visit: 6/24/2020 with Dr. Nguyen  Was seen after ICD shocks which were appropriate for fast VT which appropriately terminated VT and now was back in sinus. He had had an ICD shock the week prior as well. His diuretics were increased from 2/2 to 3/2 x2 days with plans to increase further to 3/3 if there was no improvement.    This visit  He is feeling much better. The dizzy/fatigue spells that he was having have almost totally gone away. His weight has been between 237-239. He has been gradually loosing. He has been intentionally loosing weight since Feb when he was 260. He is dieting. He isn't exercising more because of COVID. He has been going up and down the deck stairs and is feeling much better than a few months ago. He still gets RESENDIZ by the end but recovers very quickly. No SOB at rest. No orthopnea. No PNA. Wears CPAP. No LE edema or abdominal edema. No lightheadedness or dizziness. He had no prodrome for the ICD shocks.No chest pain.    He denies blood in the urine or blood in the stool. No prolonged nose bleeds.     No fever/chills. No driveline redness, drainage or pain.    No headaches or stroke symptoms.     He has not symptoms of driveline infection. However,  is having problems with his caregiver/dressing changes. Fighting with his wife and she is not always willing to do the dressing changes. He will work on identifying another person who could help him with dressing changes when she is not available.    Cardiac Medications  ASA 81 mg daily  Coumadin  Bumex 2 mg BID  Amiodarone 400 mg once a day  KCl 20 meq BID   Losartan 12.5 mg BID  Metoprolol 12.5 mg BID   Aldactone 25 mg BID    PAST MEDICAL HISTORY:  Past Medical History:   Diagnosis Date     Benign essential hypertension 5/11/2017     Cardiomyopathy, unspecified (H) 5/8/2017     CKD (chronic kidney disease) stage 3, GFR 30-59 ml/min (H) 5/11/2017     Depression 5/11/2017     Diabetes mellitus (H) 5/11/2017     H/O gastric bypass 5/11/2017     ICD (implantable cardioverter-defibrillator), biventricular, in situ 5/11/2017     LVAD (left ventricular assist device) present (H)      NICM (nonischemic cardiomyopathy) (H)/ EF 20% 5/11/2017    ECHO: LVEDd. 7.66 cm, Restrictive pattern , Severe mitral valve regurgitation     CECILIA (obstructive sleep apnea) 5/11/2017     Paroxysmal atrial fibrillation (H) 5/11/2017     Paroxysmal VT (H) 5/11/2017     Uncomplicated asthma      Vitamin B12 deficiency (non anemic) 5/11/2017       FAMILY HISTORY:  Family History   Problem Relation Age of Onset     Cerebrovascular Disease Mother      Diabetes Mother      Hypertension Mother      Coronary Artery Disease Father      Diabetes Type 2  Father        SOCIAL HISTORY:  Social History     Socioeconomic History     Marital status:      Spouse name: Not on file     Number of children: Not on file     Years of education: Not on file     Highest education level: Not on file   Occupational History     Not on file   Social Needs     Financial resource strain: Not on file     Food insecurity:     Worry: Not on file     Inability: Not on file     Transportation needs:     Medical: Not on file     Non-medical: Not on file   Tobacco Use      Smoking status: Former Smoker     Last attempt to quit:      Years since quittin.1     Smokeless tobacco: Never Used   Substance and Sexual Activity     Alcohol use: No     Drug use: No     Sexual activity: Yes     Partners: Female   Lifestyle     Physical activity:     Days per week: Not on file     Minutes per session: Not on file     Stress: Not on file   Relationships     Social connections:     Talks on phone: Not on file     Gets together: Not on file     Attends Anabaptist service: Not on file     Active member of club or organization: Not on file     Attends meetings of clubs or organizations: Not on file     Relationship status: Not on file     Intimate partner violence:     Fear of current or ex partner: Not on file     Emotionally abused: Not on file     Physically abused: Not on file     Forced sexual activity: Not on file   Other Topics Concern     Parent/sibling w/ CABG, MI or angioplasty before 65F 55M? No   Social History Narrative     Not on file       CURRENT MEDICATIONS:  acetaminophen (TYLENOL) 325 MG tablet, Take 650 mg by mouth every 6 hours as needed for mild pain  albuterol (ALBUTEROL) 108 (90 BASE) MCG/ACT Inhaler, Inhale 2 puffs into the lungs every 4 hours as needed for shortness of breath / dyspnea or wheezing  allopurinol (ZYLOPRIM) 100 MG tablet, Take 1 tablet (100 mg) by mouth daily  amiodarone (PACERONE) 400 MG tablet, Take 1 tablet (400 mg) by mouth 2 times daily  amiodarone (PACERONE) 400 MG tablet, Take 1 tablet (400 mg) by mouth daily  aspirin 81 MG chewable tablet, 1 tablet (81 mg) by Oral or Feeding Tube route daily  blood glucose monitoring (ONE TOUCH ULTRA 2) meter device kit, Use to test blood sugar four times daily or as directed.  blood glucose VI test strip, Use to test blood sugar four times daily or as directed.  bumetanide (BUMEX) 1 MG tablet, Take 2 tablets (2 mg) by mouth 2 times daily  citalopram (CELEXA) 20 MG tablet, Take 20 mg by mouth  daily  cyanocobalamin (VITAMIN B12) 1000 MCG/ML injection, Inject 1,000 mcg into the muscle every 30 days  fluticasone-vilanterol (BREO ELLIPTA) 200-25 MCG/INH oral inhaler, Inhale 1 puff into the lungs daily  gabapentin (NEURONTIN) 300 MG capsule, Take 1 capsule (300 mg) by mouth twice daily and 2 capsules (600 mg) by mouth at bedtime daily.  insulin lispro (HUMALOG KWIKPEN) 100 UNIT/ML (1 unit dial) pen, Inject 1-7 Units Subcutaneous 4 times daily  insulin pen needle (32G X 4 MM) 32G X 4 MM miscellaneous, Use four pen needles daily or as directed.  levalbuterol (XOPENEX) 1.25 MG/3ML neb solution, Take 3 mLs (1.25 mg) by nebulization every 6 hours as needed for shortness of breath / dyspnea or wheezing  losartan (COZAAR) 25 MG tablet, Take 12.5 mg by mouth 2 times daily  metFORMIN (GLUCOPHAGE) 500 MG tablet, Take 1 tablet (500 mg) by mouth 2 times daily (with meals)  metoprolol succinate ER (TOPROL-XL) 25 MG 24 hr tablet, Take 1 tablet (25 mg) by mouth 2 times daily  montelukast (SINGULAIR) 10 MG tablet, Take 10 mg by mouth At Bedtime  pantoprazole (PROTONIX) 40 MG EC tablet, Take 1 tablet (40 mg) by mouth every morning  potassium chloride ER (K-TAB/KLOR-CON) 10 MEQ CR tablet, Take 2 tablets (20 mEq) by mouth daily  predniSONE (DELTASONE) 20 MG tablet, Take 1 tablet (20 mg) by mouth daily  spironolactone (ALDACTONE) 25 MG tablet, Take 25 mg by mouth 2 times daily  traMADol (ULTRAM) 50 MG tablet, Take 2 tablets (100 mg) by mouth every 6 hours as needed for moderate pain or breakthrough pain  umeclidinium (INCRUSE ELLIPTA) 62.5 MCG/INH oral inhaler, Inhale 1 puff into the lungs daily  warfarin (COUMADIN) 5 MG tablet, Take 5 - 7.5mg daily or as directed by coumadin clinic.  zinc sulfate (ZINCATE) 220 (50 ZN) MG capsule, Take 220 mg by mouth 2 times daily    No current facility-administered medications on file prior to visit.       ROS:   See HPI    EXAM:  There were no vitals taken for this visit.    N/A phone  visit      Labs - as reviewed in clinic with patient today:  CBC RESULTS:  Lab Results   Component Value Date    WBC 11.0 07/14/2020    RBC 4.77 07/14/2020    HGB 12.7 (L) 07/14/2020    HCT 39.7 (L) 07/14/2020    MCV 83 07/14/2020    MCH 26.6 07/14/2020    MCHC 32.0 07/14/2020    RDW 18.2 (H) 07/14/2020     07/14/2020       CMP RESULTS:  Lab Results   Component Value Date     07/14/2020    POTASSIUM 4.2 07/14/2020    CHLORIDE 98 07/14/2020    CO2 26 07/14/2020    ANIONGAP 9 07/14/2020    GLC 98 07/14/2020    BUN 61 (H) 07/14/2020    CR 2.02 (H) 07/14/2020    GFRESTIMATED 34 (L) 07/14/2020    GFRESTBLACK 39 (L) 07/14/2020    QAMAR 8.6 07/14/2020    BILITOTAL 0.7 07/14/2020    ALBUMIN 4.1 07/14/2020    ALKPHOS 66 07/14/2020    ALT 29 07/14/2020    AST 22 07/14/2020        INR RESULTS:  Lab Results   Component Value Date    INR 3.57 (H) 07/14/2020       Lab Results   Component Value Date    MAG 1.9 05/11/2020     Lab Results   Component Value Date    NTBNPI 4,216 (H) 06/15/2017     Lab Results   Component Value Date    NTBNP 866 (H) 07/31/2019           Diagnostics  7/15/2020 ICD check  In clinic device appointment conversion to remote device appointment to minimize COVID19 exposure for high risk patient populations.  Scheduled remote ICD transmission received and reviewed. Device transmission sent per MD orders. Patient has a Medtronic multi lead ICD programmed AAIR. Normal ICD function. No episodes recorded. No arrhythmias recorded. Presenting EGM = SR @ 70 bpm. AP = 0%.  = 0%. BVP = 0%. OptiVol fluid index is at baseline. Estimated battery longevity to MARVA = 6 months. Battery voltage = 2.88V. No short v-v intervals recorded. Lead impedance trends appear stable. Patient notified of interrogation results. Patient reports that he is feeling well and denies complaints. Plan for patient to send a remote transmission in 3 months as scheduled.  MIKY Feliz RN     Remote ICD transmission    1/20/2020  [facility]  Interpretation Summary  Technically difficult study. Patients heart rate is in the 120s-150s during  the study.  HM2 LVAD is present and is funcioning at 9400RPM     Severely (EF 10-15%) reduced left ventricular function is present. Severe left  ventricular dilation is present. LVIDd is 6.6cm LVAD cannula in the apex is  not well visualized. Inflow and outflow velocities could not be obtained.  Aortic valve appears to open minimally with each beat. Septum is probably  midline but again difficult to visualize.  Global right ventricular function is moderately to severely reduced.  IVC diameter >2.1 cm collapsing <50% with sniff suggests a high RA pressure  estimated at 15 mmHg or greater. No pericardial effusion is present.     Compared to prior study on 1/16/20, LVIDd appears slightly larger. Patient is  severely tachycardic now. IVC appears more dilated.    7/24/2019 RHC   Time  Systolic  Diastolic  Mean  A Wave  V Wave  EDP  Max dp/dt  HR    RA Pressures  10:04 AM    10 mmHg     86568 mmHg     92829 mmHg       55 bpm       RV Pressures   9:42 AM        384 mmHg/sec         10:01 AM  25 mmHg         10 mmHg      39 bpm       PA Pressures  10:01 AM  25 mmHg     16 mmHg     19 mmHg         80 bpm       PCW Pressures  10:02 AM    11 mmHg     37417 mmHg     98933 mmHg       68 bpm          Time  TDCO  TDCI  Kee C.O.  Kee C.I.  Kee HR    Cardiac Output Results   9:42 AM  4.63 L/min     2.02 L/min/m2     4.62 L/min     2.01 L/min/m2            _____________________________________________________________________________    Assessment and Plan:   Jim Willingham is a 65 year old male with a past medical history of NICM (EF 20%) s/p HM II LVAD placement (6/19/17) at [facility] (obesity) Other relavant history includes chronic kidney disease stage III, diabetes mellitus type 2, RV failure, CECILIA, obesity, hypertension, COPD, asthma. He presents for LVAD follow-up.    Renal function continues to improve although still above hsi  baseline. Otherwise he is doing significantly better than he was earlier this year- dizziness/fatigue episodes have nearly completely resolved. Feeling much better.    #  Chronic systolic heart failure secondary to NICM  Stage D  NYHA Class IIIA  ACEi/ARB: Continue Losartan 12.5 mg BID, may be able to increase now that he is s/p cardioversion  BB: Continue metoprolol 12.5 mg BID, dizziness has improved significantly with decreased metoprolol  Aldosterone antagonist: Spironolactone 25 mg   SCD prophylaxis: ICD  Fluid status:  Slightly hypovolemic- decrease bumex to 2/1 for 3 days and then return to 2 mg BID. No changes to Kcl dosing.     #  S/P LVAD implant as DT due to obesity. Goal is to loose weight to be eligible for transplant.  Anticoagulation: Warfarin with INR goal of 2-3,  dosing per coumadin clinic  Antiplatelet: Aspirin 81 mg daily  MAP: Goal 65-85 no reading today given virtual visit  LDH: Stable, 446, BL around 380-450  Other: Will identify a new person to train on drivelien dressings given his current problems with his wife  VAD Interrogation today:  NA a telephone visit. Per patient one low voltage alarm- instructed to clean his clips and let us know if there are any further alarms.    # RV failure  - Last RHC wedge was 11, RA was 17.  - Continue lower metoprolol dose given considerable improvement  - Working towards weight loss so that he can be evaluated/listed for transplant     #  obesity: BMI >40.  Recommend weight loss with a goal weight of 255.  Weight loss clinic referral has been placed previously.    # A. Fib  New in Jan. Now S/p Cardioversion in 5/2020. Symptoms improved dramatically. In sinus on ICD check today. No a. Fib episodes since cardioversion  - Continue BB and coumadin     # CECILIA: Continue CPAP.       # Chronic kidney disease with WALTER: Recent WALTER with peak of 2.40, continues to improve. 2.02 today, still above his baseline. BL in the past year has been 1.2-1.4  - Bumex decrease as  above  - Recheck in 2 weeks    # Memory concerns  - Feels like his short term memory is poor. This is not new. No acute change  - I recommend that he see his PCP for these concerns. Contact us for acute changes as he would then need urgent imaging. No indications for this currently.    Follow-up  - BMP in 2 weeks  - In person with Melisa in 1 month as scheduled    MICHEL Yeboah REBECCA JANE    Please do not hesitate to contact me if you have any questions/concerns.     Sincerely,     Didi Butler PA-C

## 2020-07-16 NOTE — PROGRESS NOTES
"Jim Willingham is a 65 year old male who is being evaluated via a billable video visit.      The patient has been notified of following:     \"This video visit will be conducted via a call between you and your physician/provider. We have found that certain health care needs can be provided without the need for an in-person physical exam.  This service lets us provide the care you need with a video conversation.  If a prescription is necessary we can send it directly to your pharmacy.  If lab work is needed we can place an order for that and you can then stop by our lab to have the test done at a later time.    Video visits are billed at different rates depending on your insurance coverage.  Please reach out to your insurance provider with any questions.    If during the course of the call the physician/provider feels a video visit is not appropriate, you will not be charged for this service.\"    Patient has given verbal consent for Video visit? Yes  How would you like to obtain your AVS? [employer]  If you are dropped from the video visit, the video invite should be resent to: Send to e-mail at: [email or web address] will be connecting via [employer].  Will anyone else be joining your video visit? No      Medications were reconciled.     April López CMA    8:19 AM              "

## 2020-07-16 NOTE — PROGRESS NOTES
D:  Call to pt to follow up on questions from virtual clinic visit.  Pt states difficulties w/ wife doing dressing change.  I:  Instructed pt review alarms.  Informed him he could use the floor steamer.  A:  Pt had low voltage advisory.  (gave instructions on cleaning contacts of nubia, clips and battery charger.  Pt states his wife will do weekly change today.  Sister in law will do it after that.  P:  Pt and sister in law to come into clinic next week for education.

## 2020-07-16 NOTE — PATIENT INSTRUCTIONS
Medication changes:  - For the next three day, decrease your bumex to 2 mg in the morning and 1 mg in the afternoon  - After three days, go back to 2 mg twice a day  - Leave your potassium at the same dose    Instructions  - Work on finding someone new to change your driveline dressing  - I will have the LVAD coordinator call you to go through your alarms  - Please call your primary care provider to discuss memory concners    Follow-up  - Labs (BMP in 2 weeks)  - As scheduled with Melisa 8/19, this should be an in person visit

## 2020-07-17 LAB
MDC_IDC_LEAD_IMPLANT_DT: NORMAL
MDC_IDC_LEAD_LOCATION: NORMAL
MDC_IDC_LEAD_LOCATION_DETAIL_1: NORMAL
MDC_IDC_LEAD_MFG: NORMAL
MDC_IDC_LEAD_MODEL: NORMAL
MDC_IDC_LEAD_POLARITY_TYPE: NORMAL
MDC_IDC_LEAD_SERIAL: NORMAL
MDC_IDC_MSMT_BATTERY_DTM: NORMAL
MDC_IDC_MSMT_BATTERY_REMAINING_LONGEVITY: 7 MO
MDC_IDC_MSMT_BATTERY_RRT_TRIGGER: 2.73
MDC_IDC_MSMT_BATTERY_STATUS: NORMAL
MDC_IDC_MSMT_BATTERY_VOLTAGE: 2.88 V
MDC_IDC_MSMT_CAP_CHARGE_DTM: NORMAL
MDC_IDC_MSMT_CAP_CHARGE_DTM: NORMAL
MDC_IDC_MSMT_CAP_CHARGE_ENERGY: 18 J
MDC_IDC_MSMT_CAP_CHARGE_ENERGY: 35 J
MDC_IDC_MSMT_CAP_CHARGE_TIME: 4.52
MDC_IDC_MSMT_CAP_CHARGE_TIME: 9.93
MDC_IDC_MSMT_CAP_CHARGE_TYPE: NORMAL
MDC_IDC_MSMT_CAP_CHARGE_TYPE: NORMAL
MDC_IDC_MSMT_LEADCHNL_LV_IMPEDANCE_VALUE: 4047 OHM
MDC_IDC_MSMT_LEADCHNL_LV_IMPEDANCE_VALUE: 4047 OHM
MDC_IDC_MSMT_LEADCHNL_LV_IMPEDANCE_VALUE: 437 OHM
MDC_IDC_MSMT_LEADCHNL_LV_PACING_THRESHOLD_AMPLITUDE: 0.62 V
MDC_IDC_MSMT_LEADCHNL_LV_PACING_THRESHOLD_PULSEWIDTH: 0.4 MS
MDC_IDC_MSMT_LEADCHNL_RA_IMPEDANCE_VALUE: 494 OHM
MDC_IDC_MSMT_LEADCHNL_RA_PACING_THRESHOLD_AMPLITUDE: 1.25 V
MDC_IDC_MSMT_LEADCHNL_RA_PACING_THRESHOLD_PULSEWIDTH: 0.4 MS
MDC_IDC_MSMT_LEADCHNL_RA_SENSING_INTR_AMPL: 1.75 MV
MDC_IDC_MSMT_LEADCHNL_RA_SENSING_INTR_AMPL: 1.75 MV
MDC_IDC_MSMT_LEADCHNL_RV_IMPEDANCE_VALUE: 266 OHM
MDC_IDC_MSMT_LEADCHNL_RV_IMPEDANCE_VALUE: 342 OHM
MDC_IDC_MSMT_LEADCHNL_RV_PACING_THRESHOLD_AMPLITUDE: 1 V
MDC_IDC_MSMT_LEADCHNL_RV_PACING_THRESHOLD_PULSEWIDTH: 0.4 MS
MDC_IDC_MSMT_LEADCHNL_RV_SENSING_INTR_AMPL: 6.25 MV
MDC_IDC_MSMT_LEADCHNL_RV_SENSING_INTR_AMPL: 6.25 MV
MDC_IDC_PG_IMPLANT_DTM: NORMAL
MDC_IDC_PG_MFG: NORMAL
MDC_IDC_PG_MODEL: NORMAL
MDC_IDC_PG_SERIAL: NORMAL
MDC_IDC_PG_TYPE: NORMAL
MDC_IDC_SESS_CLINIC_NAME: NORMAL
MDC_IDC_SESS_DTM: NORMAL
MDC_IDC_SESS_TYPE: NORMAL
MDC_IDC_SET_BRADY_LOWRATE: 50 {BEATS}/MIN
MDC_IDC_SET_BRADY_MAX_SENSOR_RATE: 130 {BEATS}/MIN
MDC_IDC_SET_BRADY_MODE: NORMAL
MDC_IDC_SET_CRT_PACED_CHAMBERS: NORMAL
MDC_IDC_SET_LEADCHNL_LV_PACING_ANODE_ELECTRODE_1: NORMAL
MDC_IDC_SET_LEADCHNL_LV_PACING_ANODE_LOCATION_1: NORMAL
MDC_IDC_SET_LEADCHNL_LV_PACING_CATHODE_ELECTRODE_1: NORMAL
MDC_IDC_SET_LEADCHNL_LV_PACING_CATHODE_LOCATION_1: NORMAL
MDC_IDC_SET_LEADCHNL_LV_PACING_POLARITY: NORMAL
MDC_IDC_SET_LEADCHNL_RA_PACING_AMPLITUDE: 2.5 V
MDC_IDC_SET_LEADCHNL_RA_PACING_ANODE_ELECTRODE_1: NORMAL
MDC_IDC_SET_LEADCHNL_RA_PACING_ANODE_LOCATION_1: NORMAL
MDC_IDC_SET_LEADCHNL_RA_PACING_CATHODE_ELECTRODE_1: NORMAL
MDC_IDC_SET_LEADCHNL_RA_PACING_CATHODE_LOCATION_1: NORMAL
MDC_IDC_SET_LEADCHNL_RA_PACING_POLARITY: NORMAL
MDC_IDC_SET_LEADCHNL_RA_PACING_PULSEWIDTH: 0.4 MS
MDC_IDC_SET_LEADCHNL_RA_SENSING_ANODE_ELECTRODE_1: NORMAL
MDC_IDC_SET_LEADCHNL_RA_SENSING_ANODE_LOCATION_1: NORMAL
MDC_IDC_SET_LEADCHNL_RA_SENSING_CATHODE_ELECTRODE_1: NORMAL
MDC_IDC_SET_LEADCHNL_RA_SENSING_CATHODE_LOCATION_1: NORMAL
MDC_IDC_SET_LEADCHNL_RA_SENSING_POLARITY: NORMAL
MDC_IDC_SET_LEADCHNL_RA_SENSING_SENSITIVITY: 0.45 MV
MDC_IDC_SET_LEADCHNL_RV_PACING_AMPLITUDE: 2 V
MDC_IDC_SET_LEADCHNL_RV_PACING_ANODE_ELECTRODE_1: NORMAL
MDC_IDC_SET_LEADCHNL_RV_PACING_ANODE_LOCATION_1: NORMAL
MDC_IDC_SET_LEADCHNL_RV_PACING_CATHODE_ELECTRODE_1: NORMAL
MDC_IDC_SET_LEADCHNL_RV_PACING_CATHODE_LOCATION_1: NORMAL
MDC_IDC_SET_LEADCHNL_RV_PACING_POLARITY: NORMAL
MDC_IDC_SET_LEADCHNL_RV_PACING_PULSEWIDTH: 0.4 MS
MDC_IDC_SET_LEADCHNL_RV_SENSING_ANODE_ELECTRODE_1: NORMAL
MDC_IDC_SET_LEADCHNL_RV_SENSING_ANODE_LOCATION_1: NORMAL
MDC_IDC_SET_LEADCHNL_RV_SENSING_CATHODE_ELECTRODE_1: NORMAL
MDC_IDC_SET_LEADCHNL_RV_SENSING_CATHODE_LOCATION_1: NORMAL
MDC_IDC_SET_LEADCHNL_RV_SENSING_POLARITY: NORMAL
MDC_IDC_SET_LEADCHNL_RV_SENSING_SENSITIVITY: 0.3 MV
MDC_IDC_SET_ZONE_DETECTION_BEATS_DENOMINATOR: 40 {BEATS}
MDC_IDC_SET_ZONE_DETECTION_BEATS_NUMERATOR: 30 {BEATS}
MDC_IDC_SET_ZONE_DETECTION_INTERVAL: 240 MS
MDC_IDC_SET_ZONE_DETECTION_INTERVAL: 270 MS
MDC_IDC_SET_ZONE_DETECTION_INTERVAL: 360 MS
MDC_IDC_SET_ZONE_DETECTION_INTERVAL: 400 MS
MDC_IDC_SET_ZONE_DETECTION_INTERVAL: 430 MS
MDC_IDC_SET_ZONE_TYPE: NORMAL
MDC_IDC_STAT_AT_BURDEN_PERCENT: 0 %
MDC_IDC_STAT_AT_DTM_END: NORMAL
MDC_IDC_STAT_AT_DTM_START: NORMAL
MDC_IDC_STAT_BRADY_AP_VP_PERCENT: 0 %
MDC_IDC_STAT_BRADY_AP_VS_PERCENT: 0.08 %
MDC_IDC_STAT_BRADY_AS_VP_PERCENT: 0 %
MDC_IDC_STAT_BRADY_AS_VS_PERCENT: 99.92 %
MDC_IDC_STAT_BRADY_DTM_END: NORMAL
MDC_IDC_STAT_BRADY_DTM_START: NORMAL
MDC_IDC_STAT_BRADY_RA_PERCENT_PACED: 0.08 %
MDC_IDC_STAT_BRADY_RV_PERCENT_PACED: 0 %
MDC_IDC_STAT_CRT_DTM_END: NORMAL
MDC_IDC_STAT_CRT_DTM_START: NORMAL
MDC_IDC_STAT_CRT_LV_PERCENT_PACED: 0 %
MDC_IDC_STAT_CRT_PERCENT_PACED: 0 %
MDC_IDC_STAT_EPISODE_RECENT_COUNT: 0
MDC_IDC_STAT_EPISODE_RECENT_COUNT_DTM_END: NORMAL
MDC_IDC_STAT_EPISODE_RECENT_COUNT_DTM_START: NORMAL
MDC_IDC_STAT_EPISODE_TOTAL_COUNT: 0
MDC_IDC_STAT_EPISODE_TOTAL_COUNT: 0
MDC_IDC_STAT_EPISODE_TOTAL_COUNT: 189
MDC_IDC_STAT_EPISODE_TOTAL_COUNT: 19
MDC_IDC_STAT_EPISODE_TOTAL_COUNT: 19
MDC_IDC_STAT_EPISODE_TOTAL_COUNT: 2
MDC_IDC_STAT_EPISODE_TOTAL_COUNT: 292
MDC_IDC_STAT_EPISODE_TOTAL_COUNT_DTM_END: NORMAL
MDC_IDC_STAT_EPISODE_TOTAL_COUNT_DTM_START: NORMAL
MDC_IDC_STAT_EPISODE_TYPE: NORMAL
MDC_IDC_STAT_TACHYTHERAPY_ATP_DELIVERED_RECENT: 0
MDC_IDC_STAT_TACHYTHERAPY_ATP_DELIVERED_TOTAL: 14
MDC_IDC_STAT_TACHYTHERAPY_RECENT_DTM_END: NORMAL
MDC_IDC_STAT_TACHYTHERAPY_RECENT_DTM_START: NORMAL
MDC_IDC_STAT_TACHYTHERAPY_SHOCKS_ABORTED_RECENT: 0
MDC_IDC_STAT_TACHYTHERAPY_SHOCKS_ABORTED_TOTAL: 3
MDC_IDC_STAT_TACHYTHERAPY_SHOCKS_DELIVERED_RECENT: 0
MDC_IDC_STAT_TACHYTHERAPY_SHOCKS_DELIVERED_TOTAL: 4
MDC_IDC_STAT_TACHYTHERAPY_TOTAL_DTM_END: NORMAL
MDC_IDC_STAT_TACHYTHERAPY_TOTAL_DTM_START: NORMAL

## 2020-07-21 ENCOUNTER — ANTICOAGULATION THERAPY VISIT (OUTPATIENT)
Dept: ANTICOAGULATION | Facility: CLINIC | Age: 65
End: 2020-07-21

## 2020-07-21 DIAGNOSIS — Z95.811 LVAD (LEFT VENTRICULAR ASSIST DEVICE) PRESENT (H): ICD-10-CM

## 2020-07-21 DIAGNOSIS — Z79.01 LONG TERM CURRENT USE OF ANTICOAGULANT THERAPY: ICD-10-CM

## 2020-07-21 DIAGNOSIS — I50.22 CHRONIC SYSTOLIC CONGESTIVE HEART FAILURE (H): ICD-10-CM

## 2020-07-21 LAB
ANION GAP SERPL CALCULATED.3IONS-SCNC: 8 MMOL/L (ref 3–14)
BUN SERPL-MCNC: 63 MG/DL (ref 7–30)
CALCIUM SERPL-MCNC: 9.1 MG/DL (ref 8.5–10.1)
CHLORIDE SERPL-SCNC: 98 MMOL/L (ref 94–109)
CO2 SERPL-SCNC: 27 MMOL/L (ref 20–32)
CREAT SERPL-MCNC: 2.29 MG/DL (ref 0.66–1.25)
GFR SERPL CREATININE-BSD FRML MDRD: 29 ML/MIN/{1.73_M2}
GLUCOSE SERPL-MCNC: 71 MG/DL (ref 70–99)
INR PPP: 3.14 (ref 0.86–1.14)
POTASSIUM SERPL-SCNC: 4.6 MMOL/L (ref 3.4–5.3)
SODIUM SERPL-SCNC: 133 MMOL/L (ref 133–144)

## 2020-07-21 PROCEDURE — 80048 BASIC METABOLIC PNL TOTAL CA: CPT | Performed by: PHYSICIAN ASSISTANT

## 2020-07-21 PROCEDURE — 85610 PROTHROMBIN TIME: CPT | Performed by: PHYSICIAN ASSISTANT

## 2020-07-21 PROCEDURE — 36415 COLL VENOUS BLD VENIPUNCTURE: CPT | Performed by: PHYSICIAN ASSISTANT

## 2020-07-21 RX ORDER — POTASSIUM CHLORIDE 750 MG/1
20 TABLET, EXTENDED RELEASE ORAL 2 TIMES DAILY
Qty: 120 TABLET | Refills: 5 | Status: ON HOLD | COMMUNITY
Start: 2020-07-21 | End: 2020-08-07

## 2020-07-21 NOTE — PROGRESS NOTES
ANTICOAGULATION FOLLOW-UP CLINIC VISIT    Patient Name:  Jim Willingham  Date:  7/21/2020  Contact Type:  Telephone    SUBJECTIVE:         OBJECTIVE    Recent labs: (last 7 days)     07/21/20  1112   INR 3.14*       ASSESSMENT / PLAN  No question data found.  Anticoagulation Summary  As of 7/21/2020    INR goal:   2.0-3.0   TTR:   58.0 % (11.7 mo)   INR used for dosing:   3.14! (7/21/2020)   Warfarin maintenance plan:   5 mg (5 mg x 1) every Tue, Thu, Sat; 7.5 mg (5 mg x 1.5) all other days   Full warfarin instructions:   5 mg every Tue, Thu, Sat; 7.5 mg all other days   Weekly warfarin total:   45 mg   Plan last modified:   Maru Alonso RN (7/21/2020)   Next INR check:   7/28/2020   Priority:   Critical   Target end date:   Indefinite    Indications    LVAD (left ventricular assist device) present (H) [Z95.811]  Long-term (current) use of anticoagulants [Z79.01] [Z79.01]  Chronic systolic congestive heart failure (H) [I50.22]             Anticoagulation Episode Summary     INR check location:       Preferred lab:       Send INR reminders to:   [facility]    Comments:   HIPPA Forms mailed 6/26/17  Labs drawn either at [facility] or [facility], See 3/5/20 ADDENDUM, pt. weekly INR >2.0 for Cardioversion  LVAD placed 6/19/17   II  ASA 81 mg daily      Anticoagulation Care Providers     Provider Role Specialty Phone number    Delisa Montgomery MD Referring Cardiology [phone]            See the Encounter Report to view Anticoagulation Flowsheet and Dosing Calendar (Go to Encounters tab in chart review, and find the Anticoagulation Therapy Visit)    Spoke with patient.     Maru Alonso RN

## 2020-07-23 ENCOUNTER — ANCILLARY PROCEDURE (OUTPATIENT)
Dept: CT IMAGING | Facility: CLINIC | Age: 65
End: 2020-07-23
Attending: INTERNAL MEDICINE

## 2020-07-23 ENCOUNTER — ALLIED HEALTH/NURSE VISIT (OUTPATIENT)
Dept: CARDIOLOGY | Facility: CLINIC | Age: 65
End: 2020-07-23
Attending: INTERNAL MEDICINE
Payer: COMMERCIAL

## 2020-07-23 VITALS — BODY MASS INDEX: 36.34 KG/M2 | SYSTOLIC BLOOD PRESSURE: 76 MMHG | WEIGHT: 239 LBS

## 2020-07-23 DIAGNOSIS — S09.90XA HEAD INJURY: ICD-10-CM

## 2020-07-23 DIAGNOSIS — Z95.811 LVAD (LEFT VENTRICULAR ASSIST DEVICE) PRESENT (H): ICD-10-CM

## 2020-07-23 DIAGNOSIS — J44.9 COPD (CHRONIC OBSTRUCTIVE PULMONARY DISEASE) (H): ICD-10-CM

## 2020-07-23 DIAGNOSIS — I50.22 CHRONIC SYSTOLIC CONGESTIVE HEART FAILURE (H): ICD-10-CM

## 2020-07-23 DIAGNOSIS — S09.90XA HEAD INJURY: Primary | ICD-10-CM

## 2020-07-23 RX ORDER — HYDRALAZINE HYDROCHLORIDE 10 MG/1
20 TABLET, FILM COATED ORAL 3 TIMES DAILY
Qty: 545 TABLET | Refills: 3 | Status: ON HOLD | OUTPATIENT
Start: 2020-07-23 | End: 2020-08-07

## 2020-07-23 NOTE — PATIENT INSTRUCTIONS
Medications:  1. STOP Losartan  2. STOP Metoprolol (Toprol)  3. START Hydralazine 20mg three times per day    Follow-up:  1. Get labs (basic metabolic panel) drawn in 2 weeks  2. 8/19 with Melisa    Instructions:  1. We will start the transplant eval. You can expect a call from Awais or Bre (pre heart transplant coordinators).    Great job on dressing change teaching today!    Page the VAD Coordinator on call if you gain more than 3 lb in a day or 5 in a week. Please also page if you feel unwell or have alarms.     Great to see you in clinic today. To Page the VAD Coordinator on call, dial [phone] option #[phone] and ask to speak to the VAD coordinator on call.

## 2020-07-23 NOTE — NURSING NOTE
Pt and sister in law presented to clinic today for dressing change education. Sister in law (Jackie) needs to learn dressing change as pt's primary caregiver (wife, Cate) is having surgery soon and will be unable to do it.   Instructed Jackie on all steps of the dressing change. Jackie practcied on the dressing change board x3 and performed dressing change on pt x1. Jackie was able to independently perform dressing change without intervention and is signed off on dressing change education.     Pt informed VAD coordinator that he hit his head on a deck beam on 7/20 and nearly blacked out. He did not seek medical attention at the time. He spent most of 7/21 sleeping as he felt so poorly. He has had 3 episodes of dizzines/lightheadedness following hitting his head. Sent pt to CT following this appt to get a head CT and informed Dr. Montgomery of event.     Pt had labs drawn on 7/21. Results were reviewed by Dr. Montgomery. Received orders to:   stop losartan   hold beta blocker   add hydralazine 20 TID   BMP in 2 weeks. If cr not below 1.8 in next 2 weeks needs RHC.     Informed pt of these changes at the visit today. Pt verbalized understanding.     Pt's BMI has been steadily decreasing over the last year. Pt's home weight is reported at 237-239lb. Discussed transplant eval with Dr. Montgomery. MD paz with starting evaluation. Message sent to finance to get insurance approval and to pre hear transplant coordinators to initiate orders.

## 2020-07-28 ENCOUNTER — ANTICOAGULATION THERAPY VISIT (OUTPATIENT)
Dept: ANTICOAGULATION | Facility: CLINIC | Age: 65
End: 2020-07-28

## 2020-07-28 DIAGNOSIS — I50.22 CHRONIC SYSTOLIC CONGESTIVE HEART FAILURE (H): ICD-10-CM

## 2020-07-28 DIAGNOSIS — Z95.811 LVAD (LEFT VENTRICULAR ASSIST DEVICE) PRESENT (H): ICD-10-CM

## 2020-07-28 DIAGNOSIS — Z79.01 LONG TERM CURRENT USE OF ANTICOAGULANT THERAPY: ICD-10-CM

## 2020-07-28 LAB
ANION GAP SERPL CALCULATED.3IONS-SCNC: 11 MMOL/L (ref 3–14)
BUN SERPL-MCNC: 85 MG/DL (ref 7–30)
CALCIUM SERPL-MCNC: 8.8 MG/DL (ref 8.5–10.1)
CHLORIDE SERPL-SCNC: 96 MMOL/L (ref 94–109)
CO2 SERPL-SCNC: 24 MMOL/L (ref 20–32)
CREAT SERPL-MCNC: 2.81 MG/DL (ref 0.66–1.25)
GFR SERPL CREATININE-BSD FRML MDRD: 23 ML/MIN/{1.73_M2}
GLUCOSE SERPL-MCNC: 74 MG/DL (ref 70–99)
INR PPP: 2.08 (ref 0.86–1.14)
POTASSIUM SERPL-SCNC: 4.5 MMOL/L (ref 3.4–5.3)
SODIUM SERPL-SCNC: 131 MMOL/L (ref 133–144)

## 2020-07-28 PROCEDURE — 36415 COLL VENOUS BLD VENIPUNCTURE: CPT | Performed by: INTERNAL MEDICINE

## 2020-07-28 PROCEDURE — 80048 BASIC METABOLIC PNL TOTAL CA: CPT | Performed by: INTERNAL MEDICINE

## 2020-07-28 PROCEDURE — 85610 PROTHROMBIN TIME: CPT | Performed by: INTERNAL MEDICINE

## 2020-07-28 NOTE — PROGRESS NOTES
ANTICOAGULATION FOLLOW-UP CLINIC VISIT    Patient Name:  Jim Willingham  Date:  2020  Contact Type:  Telephone    SUBJECTIVE:         OBJECTIVE    Recent labs: (last 7 days)     20  1134   INR 2.08*       ASSESSMENT / PLAN  INR assessment THER    Recheck INR In: 10 DAYS    INR Location Clinic      Anticoagulation Summary  As of 2020    INR goal:   2.0-3.0   TTR:   57.8 % (11.7 mo)   INR used for dosin.08 (2020)   Warfarin maintenance plan:   5 mg (5 mg x 1) every Thu, Sat; 7.5 mg (5 mg x 1.5) all other days   Full warfarin instructions:   5 mg every Thu, Sat; 7.5 mg all other days   Weekly warfarin total:   47.5 mg   Plan last modified:   Delisa Hillman RN (2020)   Next INR check:   2020   Priority:   Critical   Target end date:   Indefinite    Indications    LVAD (left ventricular assist device) present (H) [Z95.811]  Long-term (current) use of anticoagulants [Z79.01] [Z79.01]  Chronic systolic congestive heart failure (H) [I50.22]             Anticoagulation Episode Summary     INR check location:       Preferred lab:       Send INR reminders to:   [facility]    Comments:   HIPPA Forms mailed 17  Labs drawn either at [facility] or [facility], See 3/5/20 ADDENDUM, pt. weekly INR >2.0 for Cardioversion  LVAD placed 17   II  ASA 81 mg daily      Anticoagulation Care Providers     Provider Role Specialty Phone number    Delisa Montgomery MD Referring Cardiology [phone]            See the Encounter Report to view Anticoagulation Flowsheet and Dosing Calendar (Go to Encounters tab in chart review, and find the Anticoagulation Therapy Visit)  Spoke with patient.  Patient had LVAD placed on:     Type of LVAD: HM2  Patient's current Aspirin dose: 81mg  LVAD Protocol followed:  Yes  Delisa Hillman RN

## 2020-07-29 ENCOUNTER — HOSPITAL ENCOUNTER (OUTPATIENT)
Facility: CLINIC | Age: 65
End: 2020-07-29
Attending: INTERNAL MEDICINE | Admitting: INTERNAL MEDICINE
Payer: COMMERCIAL

## 2020-07-29 DIAGNOSIS — Z95.811 LVAD (LEFT VENTRICULAR ASSIST DEVICE) PRESENT (H): ICD-10-CM

## 2020-07-29 DIAGNOSIS — Z11.59 ENCOUNTER FOR SCREENING FOR OTHER VIRAL DISEASES: Primary | ICD-10-CM

## 2020-07-29 DIAGNOSIS — I50.22 CHRONIC SYSTOLIC CONGESTIVE HEART FAILURE (H): ICD-10-CM

## 2020-07-29 DIAGNOSIS — I50.22 CHRONIC SYSTOLIC CONGESTIVE HEART FAILURE (H): Primary | ICD-10-CM

## 2020-07-29 RX ORDER — LIDOCAINE 40 MG/G
CREAM TOPICAL
Status: CANCELLED | OUTPATIENT
Start: 2020-07-29

## 2020-07-29 NOTE — PROGRESS NOTES
Pt had bmp drawn on 7/28. Noted increase in creatinine to 2.81 from 2.29 1 week ago. Discussed results with Dr. Montgomery. MD gave order for pt to have RHC, with possible admission following.   Called pt to review labs and procedure. Pt notified that he will need a COVID test 48-72hr prior to procedure. Pt verbalized understanding. Request send to  to have RHC scheduled at earliest availability.

## 2020-07-31 NOTE — TELEPHONE ENCOUNTER
RECORDS RECEIVED FROM:    DATE RECEIVED: 8.31.20    NOTES STATUS DETAILS   OFFICE NOTE from referring provider  Rajani Abdi   OFFICE NOTE from other specialist ce [facility]- john merrill    DISCHARGE SUMMARY from hospital na    DISCHARGE REPORT from the ER na    MEDICATION LIST Internal     IMAGING  (NEED IMAGES AND REPORTS)     CT SCAN     CHEST XRAY (CXR) Ce/internal [facility] 5.22.19, 3.14.19, 10.7.18    Internal- 6.24.20, 1.20.20, 1.15.20    TESTS     PULMONARY FUNCTION TESTING (PFT)         Action 7.31.20 sv   Action Taken Imaging request sent to [facility]     Message sent to nurse pool to place PFT order     --- received images

## 2020-08-01 DIAGNOSIS — Z11.59 ENCOUNTER FOR SCREENING FOR OTHER VIRAL DISEASES: ICD-10-CM

## 2020-08-01 PROCEDURE — U0003 INFECTIOUS AGENT DETECTION BY NUCLEIC ACID (DNA OR RNA); SEVERE ACUTE RESPIRATORY SYNDROME CORONAVIRUS 2 (SARS-COV-2) (CORONAVIRUS DISEASE [COVID-19]), AMPLIFIED PROBE TECHNIQUE, MAKING USE OF HIGH THROUGHPUT TECHNOLOGIES AS DESCRIBED BY CMS-2020-01-R: HCPCS | Performed by: INTERNAL MEDICINE

## 2020-08-02 LAB
SARS-COV-2 RNA SPEC QL NAA+PROBE: NOT DETECTED
SPECIMEN SOURCE: NORMAL

## 2020-08-03 NOTE — MR AVS SNAPSHOT
Jim Willingham   4/20/2018   Anticoagulation Therapy Visit    Description:  62 year old male   Provider:  Maru Alonso, RN   Department:  [facility]           INR as of 4/20/2018     Today's INR 2.08      Anticoagulation Summary as of 4/20/2018     INR goal 2.0-3.0   Today's INR 2.08   Full instructions 4/20: 12.5 mg; 4/21: 10 mg; 4/22: 10 mg; 4/23: 10 mg   Next INR check 4/24/2018    Indications   LVAD (left ventricular assist device) present (H) [Z95.811]  Long-term (current) use of anticoagulants [Z79.01] [Z79.01]         April 2018 Details    Sun Mon Tue Wed Thu Fri Sat     1               2               3               4               5               6               7                 8               9               10               11               12               13               14                 15               16               17               18               19               20      12.5 mg   See details      21      10 mg           22      10 mg         23      10 mg         24            25               26               27               28                 29               30                     Date Details   04/20 This INR check       Date of next INR:  4/24/2018         How to take your warfarin dose     To take:  10 mg Take 2 of the 5 mg tablets.    To take:  12.5 mg Take 2.5 of the 5 mg tablets.           
The patient is a 58y Female complaining of chest pain.

## 2020-08-04 ENCOUNTER — TELEPHONE (OUTPATIENT)
Dept: CARDIOLOGY | Facility: CLINIC | Age: 65
End: 2020-08-04

## 2020-08-05 ENCOUNTER — APPOINTMENT (OUTPATIENT)
Dept: CT IMAGING | Facility: CLINIC | Age: 65
DRG: 683 | End: 2020-08-05
Attending: EMERGENCY MEDICINE
Payer: COMMERCIAL

## 2020-08-05 ENCOUNTER — HOSPITAL ENCOUNTER (INPATIENT)
Facility: CLINIC | Age: 65
LOS: 2 days | Discharge: HOME HEALTH | DRG: 683 | End: 2020-08-07
Attending: EMERGENCY MEDICINE | Admitting: INTERNAL MEDICINE
Payer: COMMERCIAL

## 2020-08-05 ENCOUNTER — APPOINTMENT (OUTPATIENT)
Dept: CARDIOLOGY | Facility: CLINIC | Age: 65
DRG: 683 | End: 2020-08-05
Attending: STUDENT IN AN ORGANIZED HEALTH CARE EDUCATION/TRAINING PROGRAM
Payer: COMMERCIAL

## 2020-08-05 ENCOUNTER — APPOINTMENT (OUTPATIENT)
Dept: GENERAL RADIOLOGY | Facility: CLINIC | Age: 65
DRG: 683 | End: 2020-08-05
Attending: EMERGENCY MEDICINE
Payer: COMMERCIAL

## 2020-08-05 DIAGNOSIS — N17.9 AKI (ACUTE KIDNEY INJURY) (H): ICD-10-CM

## 2020-08-05 DIAGNOSIS — T50.905A PILL ESOPHAGITIS: ICD-10-CM

## 2020-08-05 DIAGNOSIS — N18.9 CHRONIC KIDNEY DISEASE, UNSPECIFIED CKD STAGE: ICD-10-CM

## 2020-08-05 DIAGNOSIS — E11.43 TYPE 2 DIABETES MELLITUS WITH DIABETIC AUTONOMIC NEUROPATHY, WITHOUT LONG-TERM CURRENT USE OF INSULIN (H): ICD-10-CM

## 2020-08-05 DIAGNOSIS — E87.1 HYPONATREMIA: ICD-10-CM

## 2020-08-05 DIAGNOSIS — J02.0 STREPTOCOCCAL SORE THROAT: Primary | ICD-10-CM

## 2020-08-05 DIAGNOSIS — Z20.822 2019-NCOV NOT DETECTED: ICD-10-CM

## 2020-08-05 DIAGNOSIS — K20.80 PILL ESOPHAGITIS: ICD-10-CM

## 2020-08-05 DIAGNOSIS — J02.9 SORE THROAT: ICD-10-CM

## 2020-08-05 DIAGNOSIS — I50.22 CHRONIC SYSTOLIC CONGESTIVE HEART FAILURE (H): ICD-10-CM

## 2020-08-05 DIAGNOSIS — Z95.811 LVAD (LEFT VENTRICULAR ASSIST DEVICE) PRESENT (H): ICD-10-CM

## 2020-08-05 PROBLEM — R06.02 SHORTNESS OF BREATH: Status: ACTIVE | Noted: 2020-08-05

## 2020-08-05 LAB
ALBUMIN SERPL-MCNC: 4 G/DL (ref 3.4–5)
ALP SERPL-CCNC: 71 U/L (ref 40–150)
ALT SERPL W P-5'-P-CCNC: 34 U/L (ref 0–70)
ANION GAP SERPL CALCULATED.3IONS-SCNC: 7 MMOL/L (ref 3–14)
ANION GAP SERPL CALCULATED.3IONS-SCNC: 8 MMOL/L (ref 3–14)
ANION GAP SERPL CALCULATED.3IONS-SCNC: 8 MMOL/L (ref 3–14)
AST SERPL W P-5'-P-CCNC: 34 U/L (ref 0–45)
BASOPHILS # BLD AUTO: 0 10E9/L (ref 0–0.2)
BASOPHILS NFR BLD AUTO: 0.2 %
BILIRUB SERPL-MCNC: 0.9 MG/DL (ref 0.2–1.3)
BUN SERPL-MCNC: 69 MG/DL (ref 7–30)
BUN SERPL-MCNC: 70 MG/DL (ref 7–30)
BUN SERPL-MCNC: 74 MG/DL (ref 7–30)
CALCIUM SERPL-MCNC: 8.3 MG/DL (ref 8.5–10.1)
CALCIUM SERPL-MCNC: 8.6 MG/DL (ref 8.5–10.1)
CALCIUM SERPL-MCNC: 8.7 MG/DL (ref 8.5–10.1)
CHLORIDE SERPL-SCNC: 90 MMOL/L (ref 94–109)
CHLORIDE SERPL-SCNC: 91 MMOL/L (ref 94–109)
CHLORIDE SERPL-SCNC: 92 MMOL/L (ref 94–109)
CO2 SERPL-SCNC: 23 MMOL/L (ref 20–32)
CO2 SERPL-SCNC: 23 MMOL/L (ref 20–32)
CO2 SERPL-SCNC: 26 MMOL/L (ref 20–32)
CREAT SERPL-MCNC: 3.08 MG/DL (ref 0.66–1.25)
CREAT SERPL-MCNC: 3.21 MG/DL (ref 0.66–1.25)
CREAT SERPL-MCNC: 3.33 MG/DL (ref 0.66–1.25)
DIFFERENTIAL METHOD BLD: ABNORMAL
EOSINOPHIL # BLD AUTO: 0 10E9/L (ref 0–0.7)
EOSINOPHIL NFR BLD AUTO: 0 %
ERYTHROCYTE [DISTWIDTH] IN BLOOD BY AUTOMATED COUNT: 16.8 % (ref 10–15)
GFR SERPL CREATININE-BSD FRML MDRD: 19 ML/MIN/{1.73_M2}
GFR SERPL CREATININE-BSD FRML MDRD: 19 ML/MIN/{1.73_M2}
GFR SERPL CREATININE-BSD FRML MDRD: 20 ML/MIN/{1.73_M2}
GLUCOSE BLDC GLUCOMTR-MCNC: 127 MG/DL (ref 70–99)
GLUCOSE BLDC GLUCOMTR-MCNC: 194 MG/DL (ref 70–99)
GLUCOSE BLDC GLUCOMTR-MCNC: 207 MG/DL (ref 70–99)
GLUCOSE BLDC GLUCOMTR-MCNC: 220 MG/DL (ref 70–99)
GLUCOSE SERPL-MCNC: 161 MG/DL (ref 70–99)
GLUCOSE SERPL-MCNC: 268 MG/DL (ref 70–99)
GLUCOSE SERPL-MCNC: 300 MG/DL (ref 70–99)
HBA1C MFR BLD: 7.5 % (ref 0–5.6)
HCT VFR BLD AUTO: 39.9 % (ref 40–53)
HGB BLD-MCNC: 13.1 G/DL (ref 13.3–17.7)
IMM GRANULOCYTES # BLD: 0.1 10E9/L (ref 0–0.4)
IMM GRANULOCYTES NFR BLD: 0.9 %
INR PPP: 3.85 (ref 0.86–1.14)
INR PPP: 4.01 (ref 0.86–1.14)
INTERPRETATION ECG - MUSE: NORMAL
LABORATORY COMMENT REPORT: NORMAL
LACTATE BLD-SCNC: 1.1 MMOL/L (ref 0.7–2)
LDH SERPL L TO P-CCNC: 408 U/L (ref 85–227)
LDH SERPL L TO P-CCNC: 449 U/L (ref 85–227)
LIPASE SERPL-CCNC: 168 U/L (ref 73–393)
LYMPHOCYTES # BLD AUTO: 0.8 10E9/L (ref 0.8–5.3)
LYMPHOCYTES NFR BLD AUTO: 10 %
MAGNESIUM SERPL-MCNC: 2.2 MG/DL (ref 1.6–2.3)
MCH RBC QN AUTO: 28.2 PG (ref 26.5–33)
MCHC RBC AUTO-ENTMCNC: 32.8 G/DL (ref 31.5–36.5)
MCV RBC AUTO: 86 FL (ref 78–100)
MONOCYTES # BLD AUTO: 0.4 10E9/L (ref 0–1.3)
MONOCYTES NFR BLD AUTO: 4.4 %
NEUTROPHILS # BLD AUTO: 6.8 10E9/L (ref 1.6–8.3)
NEUTROPHILS NFR BLD AUTO: 84.5 %
NRBC # BLD AUTO: 0 10*3/UL
NRBC BLD AUTO-RTO: 0 /100
NT-PROBNP SERPL-MCNC: 1162 PG/ML (ref 0–900)
PLATELET # BLD AUTO: 276 10E9/L (ref 150–450)
POTASSIUM SERPL-SCNC: 4.7 MMOL/L (ref 3.4–5.3)
POTASSIUM SERPL-SCNC: 5.4 MMOL/L (ref 3.4–5.3)
POTASSIUM SERPL-SCNC: 5.6 MMOL/L (ref 3.4–5.3)
PROT SERPL-MCNC: 7.5 G/DL (ref 6.8–8.8)
RBC # BLD AUTO: 4.64 10E12/L (ref 4.4–5.9)
SARS-COV-2 RNA SPEC QL NAA+PROBE: NEGATIVE
SARS-COV-2 RNA SPEC QL NAA+PROBE: NORMAL
SODIUM SERPL-SCNC: 121 MMOL/L (ref 133–144)
SODIUM SERPL-SCNC: 122 MMOL/L (ref 133–144)
SODIUM SERPL-SCNC: 125 MMOL/L (ref 133–144)
SPECIMEN SOURCE: NORMAL
SPECIMEN SOURCE: NORMAL
TROPONIN I SERPL-MCNC: 0.02 UG/L (ref 0–0.04)
WBC # BLD AUTO: 8 10E9/L (ref 4–11)

## 2020-08-05 PROCEDURE — 83615 LACTATE (LD) (LDH) ENZYME: CPT | Performed by: EMERGENCY MEDICINE

## 2020-08-05 PROCEDURE — 85025 COMPLETE CBC W/AUTO DIFF WBC: CPT | Performed by: EMERGENCY MEDICINE

## 2020-08-05 PROCEDURE — 25800030 ZZH RX IP 258 OP 636: Performed by: NURSE PRACTITIONER

## 2020-08-05 PROCEDURE — 83605 ASSAY OF LACTIC ACID: CPT | Performed by: STUDENT IN AN ORGANIZED HEALTH CARE EDUCATION/TRAINING PROGRAM

## 2020-08-05 PROCEDURE — 36415 COLL VENOUS BLD VENIPUNCTURE: CPT | Performed by: EMERGENCY MEDICINE

## 2020-08-05 PROCEDURE — G0378 HOSPITAL OBSERVATION PER HR: HCPCS

## 2020-08-05 PROCEDURE — 84484 ASSAY OF TROPONIN QUANT: CPT | Performed by: EMERGENCY MEDICINE

## 2020-08-05 PROCEDURE — 99285 EMERGENCY DEPT VISIT HI MDM: CPT | Mod: 25 | Performed by: EMERGENCY MEDICINE

## 2020-08-05 PROCEDURE — 25000125 ZZHC RX 250: Performed by: INTERNAL MEDICINE

## 2020-08-05 PROCEDURE — 99222 1ST HOSP IP/OBS MODERATE 55: CPT | Mod: 25 | Performed by: NURSE PRACTITIONER

## 2020-08-05 PROCEDURE — 83880 ASSAY OF NATRIURETIC PEPTIDE: CPT | Performed by: EMERGENCY MEDICINE

## 2020-08-05 PROCEDURE — 93451 RIGHT HEART CATH: CPT | Performed by: INTERNAL MEDICINE

## 2020-08-05 PROCEDURE — 93306 TTE W/DOPPLER COMPLETE: CPT

## 2020-08-05 PROCEDURE — 80048 BASIC METABOLIC PNL TOTAL CA: CPT | Performed by: NURSE PRACTITIONER

## 2020-08-05 PROCEDURE — 83036 HEMOGLOBIN GLYCOSYLATED A1C: CPT | Performed by: EMERGENCY MEDICINE

## 2020-08-05 PROCEDURE — 25000131 ZZH RX MED GY IP 250 OP 636 PS 637: Performed by: STUDENT IN AN ORGANIZED HEALTH CARE EDUCATION/TRAINING PROGRAM

## 2020-08-05 PROCEDURE — 25000125 ZZHC RX 250: Performed by: EMERGENCY MEDICINE

## 2020-08-05 PROCEDURE — 36415 COLL VENOUS BLD VENIPUNCTURE: CPT | Performed by: NURSE PRACTITIONER

## 2020-08-05 PROCEDURE — 21400000 ZZH R&B CCU UMMC

## 2020-08-05 PROCEDURE — 27210794 ZZH OR GENERAL SUPPLY STERILE: Performed by: INTERNAL MEDICINE

## 2020-08-05 PROCEDURE — 96372 THER/PROPH/DIAG INJ SC/IM: CPT | Performed by: EMERGENCY MEDICINE

## 2020-08-05 PROCEDURE — 83735 ASSAY OF MAGNESIUM: CPT | Performed by: STUDENT IN AN ORGANIZED HEALTH CARE EDUCATION/TRAINING PROGRAM

## 2020-08-05 PROCEDURE — 25000132 ZZH RX MED GY IP 250 OP 250 PS 637: Performed by: EMERGENCY MEDICINE

## 2020-08-05 PROCEDURE — 85610 PROTHROMBIN TIME: CPT | Performed by: EMERGENCY MEDICINE

## 2020-08-05 PROCEDURE — 83690 ASSAY OF LIPASE: CPT | Performed by: EMERGENCY MEDICINE

## 2020-08-05 PROCEDURE — 80048 BASIC METABOLIC PNL TOTAL CA: CPT | Performed by: STUDENT IN AN ORGANIZED HEALTH CARE EDUCATION/TRAINING PROGRAM

## 2020-08-05 PROCEDURE — 85610 PROTHROMBIN TIME: CPT | Performed by: NURSE PRACTITIONER

## 2020-08-05 PROCEDURE — 93451 RIGHT HEART CATH: CPT | Mod: 26 | Performed by: INTERNAL MEDICINE

## 2020-08-05 PROCEDURE — 93306 TTE W/DOPPLER COMPLETE: CPT | Mod: 26 | Performed by: INTERNAL MEDICINE

## 2020-08-05 PROCEDURE — 25000132 ZZH RX MED GY IP 250 OP 250 PS 637: Performed by: NURSE PRACTITIONER

## 2020-08-05 PROCEDURE — 25800030 ZZH RX IP 258 OP 636: Performed by: INTERNAL MEDICINE

## 2020-08-05 PROCEDURE — 83615 LACTATE (LD) (LDH) ENZYME: CPT | Performed by: STUDENT IN AN ORGANIZED HEALTH CARE EDUCATION/TRAINING PROGRAM

## 2020-08-05 PROCEDURE — 25000132 ZZH RX MED GY IP 250 OP 250 PS 637: Performed by: STUDENT IN AN ORGANIZED HEALTH CARE EDUCATION/TRAINING PROGRAM

## 2020-08-05 PROCEDURE — 40000141 ZZH STATISTIC PERIPHERAL IV START W/O US GUIDANCE

## 2020-08-05 PROCEDURE — 4A023N6 MEASUREMENT OF CARDIAC SAMPLING AND PRESSURE, RIGHT HEART, PERCUTANEOUS APPROACH: ICD-10-PCS | Performed by: INTERNAL MEDICINE

## 2020-08-05 PROCEDURE — 71250 CT THORAX DX C-: CPT

## 2020-08-05 PROCEDURE — 93005 ELECTROCARDIOGRAM TRACING: CPT | Performed by: EMERGENCY MEDICINE

## 2020-08-05 PROCEDURE — C9803 HOPD COVID-19 SPEC COLLECT: HCPCS | Performed by: EMERGENCY MEDICINE

## 2020-08-05 PROCEDURE — 25000132 ZZH RX MED GY IP 250 OP 250 PS 637: Performed by: INTERNAL MEDICINE

## 2020-08-05 PROCEDURE — 71046 X-RAY EXAM CHEST 2 VIEWS: CPT

## 2020-08-05 PROCEDURE — 00000146 ZZHCL STATISTIC GLUCOSE BY METER IP

## 2020-08-05 PROCEDURE — U0003 INFECTIOUS AGENT DETECTION BY NUCLEIC ACID (DNA OR RNA); SEVERE ACUTE RESPIRATORY SYNDROME CORONAVIRUS 2 (SARS-COV-2) (CORONAVIRUS DISEASE [COVID-19]), AMPLIFIED PROBE TECHNIQUE, MAKING USE OF HIGH THROUGHPUT TECHNOLOGIES AS DESCRIBED BY CMS-2020-01-R: HCPCS | Performed by: NURSE PRACTITIONER

## 2020-08-05 PROCEDURE — 36415 COLL VENOUS BLD VENIPUNCTURE: CPT | Performed by: STUDENT IN AN ORGANIZED HEALTH CARE EDUCATION/TRAINING PROGRAM

## 2020-08-05 PROCEDURE — C1894 INTRO/SHEATH, NON-LASER: HCPCS | Performed by: INTERNAL MEDICINE

## 2020-08-05 PROCEDURE — 93010 ELECTROCARDIOGRAM REPORT: CPT | Mod: Z6 | Performed by: EMERGENCY MEDICINE

## 2020-08-05 PROCEDURE — 80053 COMPREHEN METABOLIC PANEL: CPT | Performed by: EMERGENCY MEDICINE

## 2020-08-05 RX ORDER — PREDNISONE 20 MG/1
20 TABLET ORAL DAILY
Status: DISCONTINUED | OUTPATIENT
Start: 2020-08-05 | End: 2020-08-07 | Stop reason: HOSPADM

## 2020-08-05 RX ORDER — DEXTROSE MONOHYDRATE 25 G/50ML
25-50 INJECTION, SOLUTION INTRAVENOUS
Status: DISCONTINUED | OUTPATIENT
Start: 2020-08-05 | End: 2020-08-07 | Stop reason: HOSPADM

## 2020-08-05 RX ORDER — SODIUM CHLORIDE 9 MG/ML
INJECTION, SOLUTION INTRAVENOUS CONTINUOUS
Status: ACTIVE | OUTPATIENT
Start: 2020-08-05 | End: 2020-08-06

## 2020-08-05 RX ORDER — ASPIRIN 81 MG/1
81 TABLET, CHEWABLE ORAL DAILY
Status: DISCONTINUED | OUTPATIENT
Start: 2020-08-05 | End: 2020-08-07 | Stop reason: HOSPADM

## 2020-08-05 RX ORDER — ALLOPURINOL 100 MG/1
100 TABLET ORAL DAILY
Status: DISCONTINUED | OUTPATIENT
Start: 2020-08-05 | End: 2020-08-07 | Stop reason: HOSPADM

## 2020-08-05 RX ORDER — ZINC SULFATE 50(220)MG
220 CAPSULE ORAL 2 TIMES DAILY
Status: ON HOLD | COMMUNITY
End: 2021-05-31

## 2020-08-05 RX ORDER — HYDRALAZINE HYDROCHLORIDE 10 MG/1
20 TABLET, FILM COATED ORAL 3 TIMES DAILY
Status: DISCONTINUED | OUTPATIENT
Start: 2020-08-05 | End: 2020-08-07

## 2020-08-05 RX ORDER — AMIODARONE HYDROCHLORIDE 200 MG/1
200 TABLET ORAL DAILY
Status: ON HOLD | COMMUNITY
End: 2020-08-07

## 2020-08-05 RX ORDER — NICOTINE POLACRILEX 4 MG
15-30 LOZENGE BUCCAL
Status: DISCONTINUED | OUTPATIENT
Start: 2020-08-05 | End: 2020-08-07 | Stop reason: HOSPADM

## 2020-08-05 RX ORDER — MONTELUKAST SODIUM 10 MG/1
10 TABLET ORAL AT BEDTIME
Status: DISCONTINUED | OUTPATIENT
Start: 2020-08-05 | End: 2020-08-07 | Stop reason: HOSPADM

## 2020-08-05 RX ORDER — ALBUTEROL SULFATE 90 UG/1
2 AEROSOL, METERED RESPIRATORY (INHALATION) EVERY 4 HOURS PRN
Status: DISCONTINUED | OUTPATIENT
Start: 2020-08-05 | End: 2020-08-07 | Stop reason: HOSPADM

## 2020-08-05 RX ORDER — PANTOPRAZOLE SODIUM 40 MG/1
40 TABLET, DELAYED RELEASE ORAL EVERY MORNING
Status: DISCONTINUED | OUTPATIENT
Start: 2020-08-05 | End: 2020-08-07 | Stop reason: HOSPADM

## 2020-08-05 RX ORDER — WARFARIN SODIUM 2.5 MG/1
2.5 TABLET ORAL
Status: COMPLETED | OUTPATIENT
Start: 2020-08-05 | End: 2020-08-05

## 2020-08-05 RX ORDER — AMIODARONE HYDROCHLORIDE 200 MG/1
400 TABLET ORAL DAILY
Status: DISCONTINUED | OUTPATIENT
Start: 2020-08-05 | End: 2020-08-07 | Stop reason: HOSPADM

## 2020-08-05 RX ORDER — GABAPENTIN 300 MG/1
300 CAPSULE ORAL 3 TIMES DAILY
Status: DISCONTINUED | OUTPATIENT
Start: 2020-08-05 | End: 2020-08-07 | Stop reason: HOSPADM

## 2020-08-05 RX ORDER — CITALOPRAM HYDROBROMIDE 20 MG/1
20 TABLET ORAL DAILY
Status: DISCONTINUED | OUTPATIENT
Start: 2020-08-05 | End: 2020-08-07 | Stop reason: HOSPADM

## 2020-08-05 RX ORDER — AMIODARONE HYDROCHLORIDE 400 MG/1
400 TABLET ORAL DAILY
Status: ON HOLD | COMMUNITY
End: 2021-05-31

## 2020-08-05 RX ADMIN — INSULIN ASPART 2 UNITS: 100 INJECTION, SOLUTION INTRAVENOUS; SUBCUTANEOUS at 14:46

## 2020-08-05 RX ADMIN — SODIUM CHLORIDE 500 ML: 9 INJECTION, SOLUTION INTRAVENOUS at 19:39

## 2020-08-05 RX ADMIN — HYDRALAZINE HYDROCHLORIDE 20 MG: 10 TABLET, FILM COATED ORAL at 14:46

## 2020-08-05 RX ADMIN — FLUTICASONE FUROATE AND VILANTEROL TRIFENATATE 1 PUFF: 200; 25 POWDER RESPIRATORY (INHALATION) at 09:32

## 2020-08-05 RX ADMIN — SODIUM CHLORIDE: 9 INJECTION, SOLUTION INTRAVENOUS at 20:49

## 2020-08-05 RX ADMIN — BENZOCAINE AND MENTHOL 1 LOZENGE: 15; 3.6 LOZENGE ORAL at 10:48

## 2020-08-05 RX ADMIN — AMIODARONE HYDROCHLORIDE 400 MG: 200 TABLET ORAL at 09:31

## 2020-08-05 RX ADMIN — LIDOCAINE HYDROCHLORIDE 30 ML: 20 SOLUTION ORAL; TOPICAL at 03:56

## 2020-08-05 RX ADMIN — PREDNISONE 20 MG: 20 TABLET ORAL at 09:32

## 2020-08-05 RX ADMIN — GABAPENTIN 300 MG: 300 CAPSULE ORAL at 09:31

## 2020-08-05 RX ADMIN — PANTOPRAZOLE SODIUM 40 MG: 40 TABLET, DELAYED RELEASE ORAL at 09:31

## 2020-08-05 RX ADMIN — GABAPENTIN 300 MG: 300 CAPSULE ORAL at 14:46

## 2020-08-05 RX ADMIN — MONTELUKAST 10 MG: 10 TABLET, FILM COATED ORAL at 21:55

## 2020-08-05 RX ADMIN — WARFARIN SODIUM 2.5 MG: 2.5 TABLET ORAL at 19:51

## 2020-08-05 RX ADMIN — HYDRALAZINE HYDROCHLORIDE 20 MG: 10 TABLET, FILM COATED ORAL at 09:31

## 2020-08-05 RX ADMIN — HYDRALAZINE HYDROCHLORIDE 20 MG: 10 TABLET, FILM COATED ORAL at 19:51

## 2020-08-05 RX ADMIN — ALLOPURINOL 100 MG: 100 TABLET ORAL at 09:32

## 2020-08-05 RX ADMIN — UMECLIDINIUM 1 PUFF: 62.5 AEROSOL, POWDER ORAL at 09:32

## 2020-08-05 RX ADMIN — INSULIN ASPART 2 UNITS: 100 INJECTION, SOLUTION INTRAVENOUS; SUBCUTANEOUS at 09:32

## 2020-08-05 RX ADMIN — CITALOPRAM HYDROBROMIDE 20 MG: 20 TABLET, FILM COATED ORAL at 09:31

## 2020-08-05 RX ADMIN — BENZOCAINE AND MENTHOL 1 LOZENGE: 15; 3.6 LOZENGE ORAL at 14:46

## 2020-08-05 RX ADMIN — ASPIRIN 81 MG CHEWABLE TABLET 81 MG: 81 TABLET CHEWABLE at 09:31

## 2020-08-05 RX ADMIN — GABAPENTIN 300 MG: 300 CAPSULE ORAL at 19:51

## 2020-08-05 ASSESSMENT — ACTIVITIES OF DAILY LIVING (ADL): ADLS_ACUITY_SCORE: 10

## 2020-08-05 NOTE — H&P
[facility]   Cardiology II Service / Advanced Heart Failure  History & Physical    Jim Willingham  : 1955  MRN # [id number]    ADMIT DATE: 2020    PCP: Jovany Salas    CHIEF COMPLAINT: Difficulty swallowing     HPI: Jim Willingham is a 65 year old male with a past medical history including CKD Stage III, DM Type II, CECILIA, HTN, COPD, Asthma, and NICM with EF 20% s/p LVAD HM II 17 as DT due to obesity. His postoperative course was complicated by arrhythmias, WALTER, and left small pleural effusions. He presents to ED today with complaints of difficulty swallowing and acute onset of pain and shortness of breath when attempting to take his medication. He states shortly after presentation in ED he coughed up a small white pill with substantial improvement in his symptoms. He further received a GI cocktail with improvement in symptoms as well. He denies fever, chills, palpitations, nausea, vomiting, diarrhea, melena, hematochezia, abdominal bloating, LE edema, or concerns at his drive line site.     Upon presentation to ED he underwent Chest X-ray and CT scan, which returned negative. CMP on admission with acute concerns for hyponatremia, hyperkalemia, and WALTER. He was planned to undergo RHC outpatient due to progressively worsening renal function. Losartan discontinued outpatient with minimal improvement.     ROS:   CONSTITUTIONAL: Denies fever, chills, fatigue, or weight fluctuations.   HEAD: Denies headache.   EENT: Denies vision changes, hearing changes, dysphagia, or sore throat.   NECK: Denies lymphadenopathy.   CV: Denies chest pain, palpitations, or shortness of breath.   PULMONARY: Refer to HPI.   GI: Denies nausea, vomiting, diarrhea, and abdominal pain. Bowel movements are regular.   : Denies urinary alterations, dysuria, urinary frequency, hematuria, and abnormal drainage.   EXT: Denies lower extremity edema.   SKIN:Denies abnormal rashes or lesions.   MUSCULOSKELETAL:Denies  upper or lower extremity weakness and pain.   NEUROLOGIC:Denies lightheadedness, dizziness, seizures, or upper or lower extremity paresthesia.   HEMATOLOGICAL:No abnormal bruising or bleeding.   PSYCHIATRIC:Denies any mood alterations.     PMH:  Past Medical History:   Diagnosis Date     Benign essential hypertension 5/11/2017     Cardiomyopathy, unspecified (H) 5/8/2017     CKD (chronic kidney disease) stage 3, GFR 30-59 ml/min (H) 5/11/2017     Depression 5/11/2017     Diabetes mellitus (H) 5/11/2017     H/O gastric bypass 5/11/2017     ICD (implantable cardioverter-defibrillator), biventricular, in situ 5/11/2017     LVAD (left ventricular assist device) present (H)      NICM (nonischemic cardiomyopathy) (H)/ EF 20% 5/11/2017    ECHO: LVEDd. 7.66 cm, Restrictive pattern , Severe mitral valve regurgitation     CECILIA (obstructive sleep apnea) 5/11/2017     Paroxysmal atrial fibrillation (H) 5/11/2017     Paroxysmal VT (H) 5/11/2017     Uncomplicated asthma      Vitamin B12 deficiency (non anemic) 5/11/2017       PSH:  Past Surgical History:   Procedure Laterality Date     ANESTHESIA CARDIOVERSION N/A 5/11/2020    Procedure: ANESTHESIA, FOR CARDIOVERSION @1100;  Surgeon: GENERIC ANESTHESIA PROVIDER;  Location: [facility] OR     CV RIGHT HEART CATH N/A 7/24/2019    Procedure: CV RIGHT HEART CATH;  Surgeon: Renu Sears MD;  Location:  HEART CARDIAC CATH LAB     GI SURGERY  2003    [place] [facility]     INSERT VENTRICULAR ASSIST DEVICE LEFT (HEARTMATE II) N/A 6/19/2017    Procedure: INSERT VENTRICULAR ASSIST DEVICE LEFT (HEARTMATE II);  Median Sternotomy Heartmate II Left Ventricular Assist Device Insertion on Pump Oxygenator;  Surgeon: Ronnie Quigley MD;  Location:  OR     ORTHOPEDIC SURGERY  1994    right knee wired     MEDICATIONS:  Prior to Admission Medications   Prescriptions Last Dose Informant Patient Reported? Taking?   acetaminophen (TYLENOL) 325 MG tablet   Yes No   Sig: Take 650 mg by mouth every 6 hours as  needed for mild pain   albuterol (ALBUTEROL) 108 (90 BASE) MCG/ACT Inhaler  Self Yes No   Sig: Inhale 2 puffs into the lungs every 4 hours as needed for shortness of breath / dyspnea or wheezing   allopurinol (ZYLOPRIM) 100 MG tablet  Self No No   Sig: Take 1 tablet (100 mg) by mouth daily   amiodarone (PACERONE) 400 MG tablet   No No   Sig: Take 1 tablet (400 mg) by mouth daily   aspirin 81 MG chewable tablet  Self No No   Si tablet (81 mg) by Oral or Feeding Tube route daily   blood glucose VI test strip   No No   Sig: Use to test blood sugar four times daily or as directed.   blood glucose monitoring (ONE TOUCH ULTRA 2) meter device kit   No No   Sig: Use to test blood sugar four times daily or as directed.   bumetanide (BUMEX) 1 MG tablet   No No   Sig: Take 2 tablets (2 mg) by mouth 2 times daily   citalopram (CELEXA) 20 MG tablet  Self Yes No   Sig: Take 20 mg by mouth daily   cyanocobalamin (VITAMIN B12) 1000 MCG/ML injection  Self Yes No   Sig: Inject 1,000 mcg into the muscle every 30 days   fluticasone-vilanterol (BREO ELLIPTA) 200-25 MCG/INH oral inhaler  Self Yes No   Sig: Inhale 1 puff into the lungs daily   gabapentin (NEURONTIN) 300 MG capsule   Yes No   Sig: Take 1 capsule (300 mg) by mouth twice daily and 2 capsules (600 mg) by mouth at bedtime daily.   hydrALAZINE (APRESOLINE) 10 MG tablet   No No   Sig: Take 2 tablets (20 mg) by mouth 3 times daily   insulin lispro (HUMALOG KWIKPEN) 100 UNIT/ML (1 unit dial) pen   No No   Sig: Inject 1-7 Units Subcutaneous 4 times daily   insulin pen needle (32G X 4 MM) 32G X 4 MM miscellaneous   No No   Sig: Use four pen needles daily or as directed.   levalbuterol (XOPENEX) 1.25 MG/3ML neb solution   No No   Sig: Take 3 mLs (1.25 mg) by nebulization every 6 hours as needed for shortness of breath / dyspnea or wheezing   metFORMIN (GLUCOPHAGE) 500 MG tablet   Yes No   Sig: Take 1 tablet (500 mg) by mouth 2 times daily (with meals)   montelukast (SINGULAIR) 10  MG tablet  Self Yes No   Sig: Take 10 mg by mouth At Bedtime   pantoprazole (PROTONIX) 40 MG EC tablet   No No   Sig: Take 1 tablet (40 mg) by mouth every morning   potassium chloride ER (K-TAB/KLOR-CON) 10 MEQ CR tablet   Yes No   Sig: Take 2 tablets (20 mEq) by mouth 2 times daily   predniSONE (DELTASONE) 20 MG tablet   Yes No   Sig: Take 1 tablet (20 mg) by mouth daily   spironolactone (ALDACTONE) 25 MG tablet   Yes No   Sig: Take 25 mg by mouth 2 times daily   traMADol (ULTRAM) 50 MG tablet  Self No No   Sig: Take 2 tablets (100 mg) by mouth every 6 hours as needed for moderate pain or breakthrough pain   umeclidinium (INCRUSE ELLIPTA) 62.5 MCG/INH oral inhaler  Self Yes No   Sig: Inhale 1 puff into the lungs daily   warfarin (COUMADIN) 5 MG tablet   No No   Sig: Take 5 - 7.5mg daily or as directed by coumadin clinic.   zinc sulfate (ZINCATE) 220 (50 ZN) MG capsule  Self Yes No   Sig: Take 220 mg by mouth 2 times daily      Facility-Administered Medications: None        ALLERGIES:   Allergies   Allergen Reactions     Beer Shortness Of Breath     Grass Shortness Of Breath     Ace Inhibitors Unknown     Dust Mites Other (See Comments)     Asthma     Mold Other (See Comments)     Asthma     Penicillins      Sulfa Drugs Unknown and Other (See Comments)     PN: unknown       FAMILY HISTORY:  Family History   Problem Relation Age of Onset     Cerebrovascular Disease Mother      Diabetes Mother      Hypertension Mother      Coronary Artery Disease Father      Diabetes Type 2  Father      SOCIAL HISTORY:  Social History     Socioeconomic History     Marital status:      Spouse name: Not on file     Number of children: Not on file     Years of education: Not on file     Highest education level: Not on file   Occupational History     Not on file   Social Needs     Financial resource strain: Not on file     Food insecurity     Worry: Not on file     Inability: Not on file     Transportation needs     Medical: Not on  file     Non-medical: Not on file   Tobacco Use     Smoking status: Former Smoker     Last attempt to quit:      Years since quittin.6     Smokeless tobacco: Never Used   Substance and Sexual Activity     Alcohol use: No     Drug use: No     Sexual activity: Yes     Partners: Female   Lifestyle     Physical activity     Days per week: Not on file     Minutes per session: Not on file     Stress: Not on file   Relationships     Social connections     Talks on phone: Not on file     Gets together: Not on file     Attends Taoism service: Not on file     Active member of club or organization: Not on file     Attends meetings of clubs or organizations: Not on file     Relationship status: Not on file     Intimate partner violence     Fear of current or ex partner: Not on file     Emotionally abused: Not on file     Physically abused: Not on file     Forced sexual activity: Not on file   Other Topics Concern     Parent/sibling w/ CABG, MI or angioplasty before 65F 55M? No   Social History Narrative     Not on file     PHYSICAL EXAM:  Blood pressure (!) 72/62, pulse 77, temperature 98.3  F (36.8  C), temperature source Oral, resp. rate 10, SpO2 96 %.  GENERAL: Appears alert and oriented times three.   HEENT: Eye symmetrical and free of discharge bilaterally. Mucous membranes moist and without lesions.  NECK: Supple and without lymphadenopathy. JVD below clavicular line upright.   CV: RRR, S1S2 present with soft LVAD hum.   RESPIRATORY: Respirations regular, even, and unlabored. Lungs CTA throughout.   GI: Soft and non distended with normoactive bowel sounds present in all quadrants. No tenderness, rebound, guarding. No organomegaly.   EXTREMITIES: No peripheral edema. 2+ bilateral pedal pulses.   NEUROLOGIC: Alert and orientated x 3. CN II-XII grossly intact. No focal deficits.   MUSCULOSKELETAL: No joint swelling or tenderness.   SKIN: No jaundice. No rashes or lesions. LVAD drive line covered.      LABS:  CBC:  Recent Labs   Lab Test 20  0335   WBC 8.0   RBC 4.64   HGB 13.1*   HCT 39.9*   MCV 86   MCH 28.2   MCHC 32.8   RDW 16.8*          CMP:  Recent Labs   Lab Test 20  0335   *   POTASSIUM 5.4*   CHLORIDE 91*   QAMAR 8.7   CO2 23   BUN 74*   CR 3.33*   *   AST 34   ALT 34   BILITOTAL 0.9   ALBUMIN 4.0   PROTTOTAL 7.5   ALKPHOS 71     IMAGIN/15/20:  In clinic device appointment conversion to remote device appointment to minimize COVID19 exposure for high risk patient populations.  Scheduled remote ICD transmission received and reviewed. Device transmission sent per MD orders. Patient has a Medtronic multi lead ICD programmed AAIR. Normal ICD function. No episodes recorded. No arrhythmias recorded. Presenting EGM = SR @ 70 bpm. AP = 0%.  = 0%. BVP = 0%. OptiVol fluid index is at baseline. Estimated battery longevity to MARVA = 6 months. Battery voltage = 2.88V. No short v-v intervals recorded. Lead impedance trends appear stable. Patient notified of interrogation results. Patient reports that he is feeling well and denies complaints. Plan for patient to send a remote transmission in 3 months as scheduled.  MIKY Feliz RN    Echo 20:  Interpretation Summary  LVAD cannula was seen in the expected anatomic position in the LV apex.  HM2 at 9400RPM.  LVIDd 61mm.  Septum normal.  Aortic valve remain closed.  Normal flow velocities.  Global right ventricular function is mildly to moderately reduced.  The inferior vena cava is normal.  No pericardial effusion is present.    ASSESSMENT & PLAN: Jim Willingham is a 65 year old male with a past medical history including CKD Stage III, DM Type II, CECILIA, HTN, COPD, Asthma, and NICM with EF 20% s/p LVAD HM II 17 as DT due to obesity. He presented with concern for aspiration with WALTER.     Dysphagia with concern for aspiration. Imaging negative for aspiration.   - Monitor symptoms closely.   - Speech consult appreciated.     WALTER on  CKD Stage III with Hyperkalemia and Hyponatremia. Concern for hypovolemia.  - Na-125, K 4.7, Cr-3.21.   - RHC today to assess volume status.   - Bumex, Aldactone, and KCL discontinued.     Chronic SCHF secondary to NICM s/p HM II. Implanted 6/17.   Stage D, NYHA Class IIIB  ACEi/ARB Hydralazine 20 mg po TID.   BB Not on PTA  Aldosterone antagonist contraindicated due to renal dysfunction  SCD prophylaxis ICD  Fluid status hypovolemic  MAP: 67  LDH: 408  Anticoagulation: Pharmacy to dose Coumadin, Goal 2-3, 3.85 today.   Antiplatelet: ASA 81 mg po daily   - Echo consistent with AV closed and IVC collapsing. RHC today.     PAF.   - Coumadin as above.   - Continue Amiodarone     DM Type II, Uncontrolled. Hgb A1C 7.5.   - Novolog medium intensity sliding scale insulin.   - Discontinue Metformin, not a candidate for this at discharge due to CKD.     HTN.   - MAP stable at 67.    Chronic Medical Conditions:  Depression. Continue Celexa.   GERD. Continue Protonix.   Gout. Continue Allopurinol.   Obesity s/p Gastric Bypass with Chronic anemia.   Asthma. Continue home inhalers.     FEN: NPO for RHC  PROPHY:  Coumadin  LINES:  PIV  DISPO:  TBD  CODE STATUS:  Full Code     Soraya Marshall FNP-C  Pager [phone]

## 2020-08-05 NOTE — ED TRIAGE NOTES
"BIBA with c/o his night medications \"feeling stuck in my airway.\"  Continued pain for 2hrs. Per EMS no airway compromise. Patient has LVAD heartmate 2.   "

## 2020-08-05 NOTE — PLAN OF CARE
OT ED: OT/CR orders received and acknowledged. Pt remains in ED due to inability to swallow pills earlier this morning. Per chart review, pt up INDly and has RHC scheduled today. Pt currently under OBS status. Do not anticipate any OT/CR acute acre needs but will hold at this time and check in given updates with medical POC and discharge planning needs

## 2020-08-05 NOTE — PHARMACY-ADMISSION MEDICATION HISTORY
Admission Medication History Completed by Pharmacy    See [facility] Admission Navigator for allergy information, preferred outpatient pharmacy, prior to admission medications and immunization status.     Medication History Sources:     Patient via Ipad, Ztory    Changes made to PTA medication list (reason):    Added: None    Deleted: Levalbuterol    Changed: Citalopram changed to hs dosing time. Metformin changed to 1000mg qm, 500mg pm    Additional Information:    Patient was a good historian.    Current warfarin dose is 5mg Tu, Th, Sa and 7.5mg the rest of the week.  INR goal is 2-3, patient is followed by the [facility] antico clinic.    Prior to Admission medications    Medication Sig Last Dose Taking? Auth Provider   acetaminophen (TYLENOL) 325 MG tablet Take 650 mg by mouth every 6 hours as needed for mild pain Past Month at Unknown time Yes Unknown, Entered By History   allopurinol (ZYLOPRIM) 100 MG tablet Take 1 tablet (100 mg) by mouth daily 8/4/2020 at Unknown time Yes Delisa Montgomery MD   amiodarone (PACERONE) 200 MG tablet Take 200 mg by mouth daily  Yes Unknown, Entered By History   amiodarone (PACERONE) 200 MG tablet Take 400 mg by mouth daily 8/4/2020 at Unknown time Yes Unknown, Entered By History   aspirin 81 MG chewable tablet 1 tablet (81 mg) by Oral or Feeding Tube route daily 8/4/2020 at Unknown time Yes Madina Laguna PA   bumetanide (BUMEX) 1 MG tablet Take 2 tablets (2 mg) by mouth 2 times daily 8/4/2020 at Unknown time Yes Soraya Marshall APRN CNP   citalopram (CELEXA) 20 MG tablet Take 20 mg by mouth At Bedtime  8/4/2020 at Unknown time Yes Reported, Patient   fluticasone-vilanterol (BREO ELLIPTA) 200-25 MCG/INH oral inhaler Inhale 1 puff into the lungs daily 8/4/2020 at Unknown time Yes Reported, Patient   gabapentin (NEURONTIN) 300 MG capsule Take 1 capsule (300 mg) by mouth twice daily and 2 capsules (600 mg) by mouth at bedtime daily. 8/4/2020 at Unknown time Yes  Unknown, Entered By History   hydrALAZINE (APRESOLINE) 10 MG tablet Take 2 tablets (20 mg) by mouth 3 times daily 8/4/2020 at Unknown time Yes Delisa Montgomery MD   insulin lispro (HUMALOG KWIKPEN) 100 UNIT/ML (1 unit dial) pen Inject 1-7 Units Subcutaneous 4 times daily 8/4/2020 at Unknown time Yes Soraya Marshall APRN CNP   metFORMIN (GLUCOPHAGE) 500 MG tablet Take 1000mg po qam and 500mg qpm 8/4/2020 at Unknown time Yes Shelia Means NP   montelukast (SINGULAIR) 10 MG tablet Take 10 mg by mouth At Bedtime 8/4/2020 at Unknown time Yes Reported, Patient   pantoprazole (PROTONIX) 40 MG EC tablet Take 1 tablet (40 mg) by mouth every morning 8/4/2020 at Unknown time Yes Shelia Means NP   potassium chloride ER (K-TAB/KLOR-CON) 10 MEQ CR tablet Take 2 tablets (20 mEq) by mouth 2 times daily 8/4/2020 at Unknown time Yes Didi Butler PA-C   predniSONE (DELTASONE) 20 MG tablet Take 1 tablet (20 mg) by mouth daily 8/4/2020 at Unknown time Yes Didi Butler PA-C   spironolactone (ALDACTONE) 25 MG tablet Take 25 mg by mouth 2 times daily 8/4/2020 at Unknown time Yes Unknown, Entered By History   traMADol (ULTRAM) 50 MG tablet Take 2 tablets (100 mg) by mouth every 6 hours as needed for moderate pain or breakthrough pain 8/4/2020 at Unknown time Yes Nash Amado PA   umeclidinium (INCRUSE ELLIPTA) 62.5 MCG/INH oral inhaler Inhale 1 puff into the lungs daily 8/4/2020 at Unknown time Yes Reported, Patient   warfarin (COUMADIN) 5 MG tablet Take 5 - 7.5mg daily or as directed by coumadin clinic. 8/4/2020 at Unknown time Yes Delisa Montgomery MD   zinc sulfate (ZINCATE) 220 (50 Zn) MG capsule Take 220 mg by mouth 2 times daily 8/4/2020 at Unknown time Yes Unknown, Entered By History   albuterol (ALBUTEROL) 108 (90 BASE) MCG/ACT Inhaler Inhale 2 puffs into the lungs every 4 hours as needed for shortness of breath / dyspnea or wheezing More than a month at Unknown time  Reported,  Patient   blood glucose monitoring (ONE TOUCH ULTRA 2) meter device kit Use to test blood sugar four times daily or as directed.   Soraya Marshall APRN CNP   blood glucose VI test strip Use to test blood sugar four times daily or as directed.   Soraya Marshall APRN CNP   cyanocobalamin (VITAMIN B12) 1000 MCG/ML injection Inject 1,000 mcg into the muscle every 30 days More than a month at Unknown time  Reported, Patient   insulin pen needle (32G X 4 MM) 32G X 4 MM miscellaneous Use four pen needles daily or as directed.   Soraya Marshall APRN CNP       Date completed: 08/05/20    Medication history completed by: Adalgisa Banks [facility]

## 2020-08-05 NOTE — ED NOTES
Bed: ED17  Expected date:   Expected time:   Means of arrival:   Comments:  A648: 65M, LVAD patient with c/o medication stuck in esophageus for 2hrs

## 2020-08-05 NOTE — ED PROVIDER NOTES
ED Provider Note  [facility]      History     Chief Complaint   Patient presents with     Swallowed Foreign Body     HPI  Jim Willingham is a 65 year old male who has a past medical history of cardiomyopathy, atrial fibrillation, status post LVAD, diabetes mellitus presenting with concern about inhaling some pills.  He says he coughed up a couple chunks of pills prior to arrival.  He has pain in his left chest.  It does not radiate into his back, jaw or arms.  No diaphoresis or nausea.  Patient is swallowing his saliva.  He says his breathing is okay.  No pain or swelling in the lower legs.  He is taking his medications as prescribed.  He does have a right heart cath today with cardiology.  He says occasionally he gets meat stuck in his esophagus.    Past Medical History  Past Medical History:   Diagnosis Date     Benign essential hypertension 5/11/2017     Cardiomyopathy, unspecified (H) 5/8/2017     CKD (chronic kidney disease) stage 3, GFR 30-59 ml/min (H) 5/11/2017     Depression 5/11/2017     Diabetes mellitus (H) 5/11/2017     H/O gastric bypass 5/11/2017     ICD (implantable cardioverter-defibrillator), biventricular, in situ 5/11/2017     LVAD (left ventricular assist device) present (H)      NICM (nonischemic cardiomyopathy) (H)/ EF 20% 5/11/2017    ECHO: LVEDd. 7.66 cm, Restrictive pattern , Severe mitral valve regurgitation     CECILIA (obstructive sleep apnea) 5/11/2017     Paroxysmal atrial fibrillation (H) 5/11/2017     Paroxysmal VT (H) 5/11/2017     Uncomplicated asthma      Vitamin B12 deficiency (non anemic) 5/11/2017     Past Surgical History:   Procedure Laterality Date     ANESTHESIA CARDIOVERSION N/A 5/11/2020    Procedure: ANESTHESIA, FOR CARDIOVERSION @1100;  Surgeon: GENERIC ANESTHESIA PROVIDER;  Location: [facility] OR     CV RIGHT HEART CATH N/A 7/24/2019    Procedure: CV RIGHT HEART CATH;  Surgeon: Renu Sears MD;  Location:  HEART CARDIAC CATH LAB     GI  SURGERY  2003    [facility]     INSERT VENTRICULAR ASSIST DEVICE LEFT (HEARTMATE II) N/A 6/19/2017    Procedure: INSERT VENTRICULAR ASSIST DEVICE LEFT (HEARTMATE II);  Median Sternotomy Heartmate II Left Ventricular Assist Device Insertion on Pump Oxygenator;  Surgeon: Ronnie Quigley MD;  Location: [facility] OR     ORTHOPEDIC SURGERY  1994    right knee wired     acetaminophen (TYLENOL) 325 MG tablet  albuterol (ALBUTEROL) 108 (90 BASE) MCG/ACT Inhaler  allopurinol (ZYLOPRIM) 100 MG tablet  amiodarone (PACERONE) 400 MG tablet  aspirin 81 MG chewable tablet  blood glucose monitoring (ONE TOUCH ULTRA 2) meter device kit  blood glucose VI test strip  bumetanide (BUMEX) 1 MG tablet  citalopram (CELEXA) 20 MG tablet  cyanocobalamin (VITAMIN B12) 1000 MCG/ML injection  fluticasone-vilanterol (BREO ELLIPTA) 200-25 MCG/INH oral inhaler  gabapentin (NEURONTIN) 300 MG capsule  hydrALAZINE (APRESOLINE) 10 MG tablet  insulin lispro (HUMALOG KWIKPEN) 100 UNIT/ML (1 unit dial) pen  insulin pen needle (32G X 4 MM) 32G X 4 MM miscellaneous  levalbuterol (XOPENEX) 1.25 MG/3ML neb solution  metFORMIN (GLUCOPHAGE) 500 MG tablet  montelukast (SINGULAIR) 10 MG tablet  pantoprazole (PROTONIX) 40 MG EC tablet  potassium chloride ER (K-TAB/KLOR-CON) 10 MEQ CR tablet  predniSONE (DELTASONE) 20 MG tablet  spironolactone (ALDACTONE) 25 MG tablet  traMADol (ULTRAM) 50 MG tablet  umeclidinium (INCRUSE ELLIPTA) 62.5 MCG/INH oral inhaler  warfarin (COUMADIN) 5 MG tablet  zinc sulfate (ZINCATE) 220 (50 ZN) MG capsule      Allergies   Allergen Reactions     Beer Shortness Of Breath     Grass Shortness Of Breath     Ace Inhibitors Unknown     Dust Mites Other (See Comments)     Asthma     Mold Other (See Comments)     Asthma     Penicillins      Sulfa Drugs Unknown and Other (See Comments)     PN: unknown     Past medical history, past surgical history, medications, and allergies were reviewed with the patient. Additional pertinent items: None    Family  History  Family History   Problem Relation Age of Onset     Cerebrovascular Disease Mother      Diabetes Mother      Hypertension Mother      Coronary Artery Disease Father      Diabetes Type 2  Father      Family history was reviewed with the patient. Additional pertinent items: None    Social History  Social History     Tobacco Use     Smoking status: Former Smoker     Last attempt to quit:      Years since quittin.6     Smokeless tobacco: Never Used   Substance Use Topics     Alcohol use: No     Drug use: No      Social history was reviewed with the patient. Additional pertinent items: None    Review of Systems  A complete review of systems was performed with pertinent positives and negatives noted in the HPI, and all other systems negative.    Physical Exam   Pulse: 77  Heart Rate: 80  Temp: 98.3  F (36.8  C)  Resp: 20  SpO2: 100 %  Physical Exam  Physical Exam   Constitutional: oriented to person, place, and time. appears well-developed and well-nourished.   HENT:   Head: Normocephalic and atraumatic.   Neck: Normal range of motion.   Pulmonary/Chest: Effort normal. No respiratory distress. Driveline with no tenderness palpation around this area or swelling, erythema or discharge at the insertion site.  No tenderness palpation of the chest wall.  Cardiac: No murmurs, rubs, gallops. RRR.  Abdominal: Abdomen soft, nontender, nondistended. No rebound tenderness.  MSK: Long bones without deformity or evidence of trauma.  No significant lower extremity edema.  Neurological: alert and oriented to person, place, and time.   Skin: Skin is warm and dry.   Psychiatric:  normal mood and affect.  behavior is normal. Thought content normal.     ED Course      Procedures             EKG Interpretation:      Interpreted by Corey Willingham MD  Time reviewed: 0430  Symptoms at time of EKG: chest pain   Rhythm: paced  Rate: Normal  Axis: Right Axis Deviation  Ectopy: none  Conduction: nonspecific interventricular  conduction block  ST Segments/ T Waves: No acute ischemic changes  Q Waves: nonspecific  Comparison to prior: Unchanged    Clinical Impression: no acute changes    Results for orders placed or performed during the hospital encounter of 08/05/20   XR Chest 2 Views     Status: None    Narrative    EXAM: XR CHEST 2 VIEW  LOCATION: [facility]  DATE/TIME: 08/05/2020, 3:51 AM    INDICATION: Chest pain.  COMPARISON: 06/24/2020.      Impression    IMPRESSION: Postop sternotomy. Left-sided cardiac pacer. Left ventricular assist device. Mild cardiac enlargement. Pulmonary vascularity within normal limits. No acute infiltrates or consolidation. Aortic calcification. Remainder of the exam is   unremarkable. No acute findings and no change compared to the prior study.     Chest CT w/o contrast     Status: None    Narrative    EXAM: CT CHEST WITHOUT CONTRAST  LOCATION: [facility]  DATE/TIME: 08/05/2020, 4:40 AM    INDICATION: Ongoing left chest pain, symptoms concerning for aspiration.  COMPARISON: None.  TECHNIQUE: CT chest without IV contrast. Multiplanar reformats were obtained. Dose reduction techniques were used.  CONTRAST: None.    FINDINGS:   LUNGS AND PLEURA: No focal consolidation. No pneumothorax or pleural effusion.    MEDIASTINUM/AXILLAE: Mildly moderately enlarged heart. LVAD device in situ. Status post median sternotomy. Left subclavian approach pacer/AICD.    UPPER ABDOMEN: No significant finding.    MUSCULOSKELETAL: Unremarkable.      Impression    IMPRESSION:   1.  No focal consolidation to suggest aspiration or pneumonia.  2.  Status post median sternotomy with LVAD device in situ.  3.  Atherosclerotic vascular disease and coronary artery disease.     CBC with platelets differential     Status: Abnormal   Result Value Ref Range    WBC 8.0 4.0 - 11.0 10e9/L    RBC Count 4.64 4.4 - 5.9 10e12/L    Hemoglobin 13.1 (L) 13.3 - 17.7 g/dL    Hematocrit 39.9 (L) 40.0 - 53.0 %    MCV 86 78 -  100 fl    MCH 28.2 26.5 - 33.0 pg    MCHC 32.8 31.5 - 36.5 g/dL    RDW 16.8 (H) 10.0 - 15.0 %    Platelet Count 276 150 - 450 10e9/L    Diff Method Automated Method     % Neutrophils 84.5 %    % Lymphocytes 10.0 %    % Monocytes 4.4 %    % Eosinophils 0.0 %    % Basophils 0.2 %    % Immature Granulocytes 0.9 %    Nucleated RBCs 0 0 /100    Absolute Neutrophil 6.8 1.6 - 8.3 10e9/L    Absolute Lymphocytes 0.8 0.8 - 5.3 10e9/L    Absolute Monocytes 0.4 0.0 - 1.3 10e9/L    Absolute Eosinophils 0.0 0.0 - 0.7 10e9/L    Absolute Basophils 0.0 0.0 - 0.2 10e9/L    Abs Immature Granulocytes 0.1 0 - 0.4 10e9/L    Absolute Nucleated RBC 0.0    Comprehensive metabolic panel     Status: Abnormal   Result Value Ref Range    Sodium 122 (L) 133 - 144 mmol/L    Potassium 5.4 (H) 3.4 - 5.3 mmol/L    Chloride 91 (L) 94 - 109 mmol/L    Carbon Dioxide 23 20 - 32 mmol/L    Anion Gap 8 3 - 14 mmol/L    Glucose 268 (H) 70 - 99 mg/dL    Urea Nitrogen 74 (H) 7 - 30 mg/dL    Creatinine 3.33 (H) 0.66 - 1.25 mg/dL    GFR Estimate 19 (L) >60 mL/min/[1.73_m2]    GFR Estimate If Black 22 (L) >60 mL/min/[1.73_m2]    Calcium 8.7 8.5 - 10.1 mg/dL    Bilirubin Total 0.9 0.2 - 1.3 mg/dL    Albumin 4.0 3.4 - 5.0 g/dL    Protein Total 7.5 6.8 - 8.8 g/dL    Alkaline Phosphatase 71 40 - 150 U/L    ALT 34 0 - 70 U/L    AST 34 0 - 45 U/L   Lipase     Status: None   Result Value Ref Range    Lipase 168 73 - 393 U/L   Troponin I     Status: None   Result Value Ref Range    Troponin I ES 0.018 0.000 - 0.045 ug/L   Nt probnp inpatient     Status: Abnormal   Result Value Ref Range    N-Terminal Pro BNP Inpatient 1,162 (H) 0 - 900 pg/mL   EKG 12-lead, tracing only     Status: None (Preliminary result)   Result Value Ref Range    Interpretation ECG Click View Image link to view waveform and result        Results for orders placed or performed during the hospital encounter of 08/05/20   XR Chest 2 Views     Status: None    Narrative    EXAM: XR CHEST 2  VIEW  LOCATION: [facility]  DATE/TIME: 08/05/2020, 3:51 AM    INDICATION: Chest pain.  COMPARISON: 06/24/2020.      Impression    IMPRESSION: Postop sternotomy. Left-sided cardiac pacer. Left ventricular assist device. Mild cardiac enlargement. Pulmonary vascularity within normal limits. No acute infiltrates or consolidation. Aortic calcification. Remainder of the exam is   unremarkable. No acute findings and no change compared to the prior study.     Chest CT w/o contrast     Status: None    Narrative    EXAM: CT CHEST WITHOUT CONTRAST  LOCATION: [facility]  DATE/TIME: 08/05/2020, 4:40 AM    INDICATION: Ongoing left chest pain, symptoms concerning for aspiration.  COMPARISON: None.  TECHNIQUE: CT chest without IV contrast. Multiplanar reformats were obtained. Dose reduction techniques were used.  CONTRAST: None.    FINDINGS:   LUNGS AND PLEURA: No focal consolidation. No pneumothorax or pleural effusion.    MEDIASTINUM/AXILLAE: Mildly moderately enlarged heart. LVAD device in situ. Status post median sternotomy. Left subclavian approach pacer/AICD.    UPPER ABDOMEN: No significant finding.    MUSCULOSKELETAL: Unremarkable.      Impression    IMPRESSION:   1.  No focal consolidation to suggest aspiration or pneumonia.  2.  Status post median sternotomy with LVAD device in situ.  3.  Atherosclerotic vascular disease and coronary artery disease.     CBC with platelets differential     Status: Abnormal   Result Value Ref Range    WBC 8.0 4.0 - 11.0 10e9/L    RBC Count 4.64 4.4 - 5.9 10e12/L    Hemoglobin 13.1 (L) 13.3 - 17.7 g/dL    Hematocrit 39.9 (L) 40.0 - 53.0 %    MCV 86 78 - 100 fl    MCH 28.2 26.5 - 33.0 pg    MCHC 32.8 31.5 - 36.5 g/dL    RDW 16.8 (H) 10.0 - 15.0 %    Platelet Count 276 150 - 450 10e9/L    Diff Method Automated Method     % Neutrophils 84.5 %    % Lymphocytes 10.0 %    % Monocytes 4.4 %    % Eosinophils 0.0 %    % Basophils 0.2 %    % Immature Granulocytes 0.9 %     Nucleated RBCs 0 0 /100    Absolute Neutrophil 6.8 1.6 - 8.3 10e9/L    Absolute Lymphocytes 0.8 0.8 - 5.3 10e9/L    Absolute Monocytes 0.4 0.0 - 1.3 10e9/L    Absolute Eosinophils 0.0 0.0 - 0.7 10e9/L    Absolute Basophils 0.0 0.0 - 0.2 10e9/L    Abs Immature Granulocytes 0.1 0 - 0.4 10e9/L    Absolute Nucleated RBC 0.0    Comprehensive metabolic panel     Status: Abnormal   Result Value Ref Range    Sodium 122 (L) 133 - 144 mmol/L    Potassium 5.4 (H) 3.4 - 5.3 mmol/L    Chloride 91 (L) 94 - 109 mmol/L    Carbon Dioxide 23 20 - 32 mmol/L    Anion Gap 8 3 - 14 mmol/L    Glucose 268 (H) 70 - 99 mg/dL    Urea Nitrogen 74 (H) 7 - 30 mg/dL    Creatinine 3.33 (H) 0.66 - 1.25 mg/dL    GFR Estimate 19 (L) >60 mL/min/[1.73_m2]    GFR Estimate If Black 22 (L) >60 mL/min/[1.73_m2]    Calcium 8.7 8.5 - 10.1 mg/dL    Bilirubin Total 0.9 0.2 - 1.3 mg/dL    Albumin 4.0 3.4 - 5.0 g/dL    Protein Total 7.5 6.8 - 8.8 g/dL    Alkaline Phosphatase 71 40 - 150 U/L    ALT 34 0 - 70 U/L    AST 34 0 - 45 U/L   Lipase     Status: None   Result Value Ref Range    Lipase 168 73 - 393 U/L   Troponin I     Status: None   Result Value Ref Range    Troponin I ES 0.018 0.000 - 0.045 ug/L   Nt probnp inpatient     Status: Abnormal   Result Value Ref Range    N-Terminal Pro BNP Inpatient 1,162 (H) 0 - 900 pg/mL   EKG 12-lead, tracing only     Status: None (Preliminary result)   Result Value Ref Range    Interpretation ECG Click View Image link to view waveform and result      Medications   lidocaine (XYLOCAINE) 2 % 15 mL, alum & mag hydroxide-simethicone (MAALOX  ES) 15 mL GI Cocktail (30 mLs Oral Given 8/5/20 0356)        Assessments & Plan (with Medical Decision Making)   MDM  Patient presented with concern that he aspirated some of his medicines.  I do feel is more likely a pill esophagitis as he had resolution of symptoms after GI cocktail, CT the chest is unremarkable.  Patient's labs unfortunately show worsening hyponatremia and kidney  failure.  On review of chart he supposed to have a right heart catheterization today and likely admission after this.  He does not appear fluid overloaded.  Discussed with on-call cardiologist who recommended an observation admission for right heart cath and management of fluid status and electrolytes.  Patient admitted to their service for further care.  He agrees with this plan.    I have reviewed the nursing notes. I have reviewed the findings, diagnosis, plan and need for follow up with the patient.    New Prescriptions    No medications on file       Final diagnoses:   Hyponatremia   Chronic kidney disease, unspecified CKD stage   Pill esophagitis       --  Corey Willingham MD   Emergency Medicine   [facility], [place], EMERGENCY DEPARTMENT  8/5/2020     Corey Willingham MD  08/05/20 0516

## 2020-08-05 NOTE — ED NOTES
[facility], [place]   ED Nurse to Floor Handoff     Jim Willingham is a 65 year old male who speaks English and lives with family members,  in a home  They arrived in the ED by ambulance from home    ED Chief Complaint: Swallowed Foreign Body    ED Dx;   Final diagnoses:   Hyponatremia   Chronic kidney disease, unspecified CKD stage   Pill esophagitis         Needed?: No    Allergies:   Allergies   Allergen Reactions     Beer Shortness Of Breath     Grass Shortness Of Breath     Ace Inhibitors Unknown     Dust Mites Other (See Comments)     Asthma     Mold Other (See Comments)     Asthma     Penicillins      Sulfa Drugs Unknown and Other (See Comments)     PN: unknown   .  Past Medical Hx:   Past Medical History:   Diagnosis Date     Benign essential hypertension 5/11/2017     Cardiomyopathy, unspecified (H) 5/8/2017     CKD (chronic kidney disease) stage 3, GFR 30-59 ml/min (H) 5/11/2017     Depression 5/11/2017     Diabetes mellitus (H) 5/11/2017     H/O gastric bypass 5/11/2017     ICD (implantable cardioverter-defibrillator), biventricular, in situ 5/11/2017     LVAD (left ventricular assist device) present (H)      NICM (nonischemic cardiomyopathy) (H)/ EF 20% 5/11/2017    ECHO: LVEDd. 7.66 cm, Restrictive pattern , Severe mitral valve regurgitation     CECILIA (obstructive sleep apnea) 5/11/2017     Paroxysmal atrial fibrillation (H) 5/11/2017     Paroxysmal VT (H) 5/11/2017     Uncomplicated asthma      Vitamin B12 deficiency (non anemic) 5/11/2017      Baseline Mental status: WDL  Current Mental Status changes: at basesline    Infection present or suspected this encounter: no  Sepsis suspected: No  Isolation type: No active isolations     Activity level - Baseline/Home:  Independent  Activity Level - Current:   Independent    Bariatric equipment needed?: No    In the ED these meds were given:   Medications   lidocaine (XYLOCAINE) 2 % 15 mL, alum & mag hydroxide-simethicone  (MAALOX  ES) 15 mL GI Cocktail (30 mLs Oral Given 8/5/20 0356)       Drips running?  No    Home pump  No    Current LDAs  Peripheral IV 08/05/20 Left Wrist (Active)   Number of days: 0       Wound 01/20/20 Right Knee Abrasion(s) scabbed and open center appears dry (Active)   Number of days: 198       Incision/Surgical Site 06/19/17 Chest (Active)   Number of days: 1143       Incision/Surgical Site 07/01/17 Sternum (Active)   Number of days: 1131       Incision/Surgical Site 07/01/17 (Active)   Number of days: 1131       Incision/Surgical Site 07/01/17 Right Abdomen (Active)   Number of days: 1131       Labs results:   Labs Ordered and Resulted from Time of ED Arrival Up to the Time of Departure from the ED   CBC WITH PLATELETS DIFFERENTIAL - Abnormal; Notable for the following components:       Result Value    Hemoglobin 13.1 (*)     Hematocrit 39.9 (*)     RDW 16.8 (*)     All other components within normal limits   COMPREHENSIVE METABOLIC PANEL - Abnormal; Notable for the following components:    Sodium 122 (*)     Potassium 5.4 (*)     Chloride 91 (*)     Glucose 268 (*)     Urea Nitrogen 74 (*)     Creatinine 3.33 (*)     GFR Estimate 19 (*)     GFR Estimate If Black 22 (*)     All other components within normal limits   NT PROBNP INPATIENT - Abnormal; Notable for the following components:    N-Terminal Pro BNP Inpatient 1,162 (*)     All other components within normal limits   LACTATE DEHYDROGENASE - Abnormal; Notable for the following components:    Lactate Dehydrogenase 449 (*)     All other components within normal limits   LIPASE   TROPONIN I   LACTIC ACID WHOLE BLOOD       Imaging Studies:   Recent Results (from the past 24 hour(s))   XR Chest 2 Views    Narrative    EXAM: XR CHEST 2 VIEW  LOCATION: [facility]  DATE/TIME: 08/05/2020, 3:51 AM    INDICATION: Chest pain.  COMPARISON: 06/24/2020.      Impression    IMPRESSION: Postop sternotomy. Left-sided cardiac pacer. Left ventricular assist  device. Mild cardiac enlargement. Pulmonary vascularity within normal limits. No acute infiltrates or consolidation. Aortic calcification. Remainder of the exam is   unremarkable. No acute findings and no change compared to the prior study.     Chest CT w/o contrast    Narrative    EXAM: CT CHEST WITHOUT CONTRAST  LOCATION: [facility]  DATE/TIME: 08/05/2020, 4:40 AM    INDICATION: Ongoing left chest pain, symptoms concerning for aspiration.  COMPARISON: None.  TECHNIQUE: CT chest without IV contrast. Multiplanar reformats were obtained. Dose reduction techniques were used.  CONTRAST: None.    FINDINGS:   LUNGS AND PLEURA: No focal consolidation. No pneumothorax or pleural effusion.    MEDIASTINUM/AXILLAE: Mildly moderately enlarged heart. LVAD device in situ. Status post median sternotomy. Left subclavian approach pacer/AICD.    UPPER ABDOMEN: No significant finding.    MUSCULOSKELETAL: Unremarkable.      Impression    IMPRESSION:   1.  No focal consolidation to suggest aspiration or pneumonia.  2.  Status post median sternotomy with LVAD device in situ.  3.  Atherosclerotic vascular disease and coronary artery disease.         Recent vital signs:   Pulse 77   Temp 98.3  F (36.8  C) (Oral)   Resp 10   SpO2 92%     [place] Coma Scale Score: 15 (08/05/20 0243)       Cardiac Rhythm: Other  Pt needs tele? No  Skin/wound Issues: None    Code Status: Full Code    Pain control: good    Nausea control: pt had none    Abnormal labs/tests/findings requiring intervention: Elevated creatinine.  Pt scheduled for right hear cath today.    Family present during ED course? No   Family Comments/Social Situation comments: n/a    Tasks needing completion: None    Jessica Mccarthy RN  [facility] -- [phone] [place] [facility]  [phone] [facility] ED

## 2020-08-05 NOTE — PROGRESS NOTES
[facility]   Interventional Cardiology        Consenting/Education for Right Heart Catheterization     Patient understands due to their history of heart failure (with LVAD in place), low sodium we would like to perform right heart catheterization.  This procedure will be performed by Dr. Seth    Patient understands during the right heart catheterization, a fine tube (catheter) is put into the vein of the groin/neck.  It is carefully passed along until it reaches the heart and then goes up into the blood vessels of the lungs. This is done to measure a variety of pressures in your heart and can tell us how well the heart is filling and emptying, as well as monitor fluid status. This is usually painless. The doctor uses x-ray imaging to see the catheter. Afterwards, the catheter is removed, and incision site covered before leaving the cath lab. This procedure is done with no sedation, usually only requiring Lidocaine.     Patient also understands risks and complications of the procedure which include, but are not limited to bruising/swelling around the incision site, infection, bleeding, allergic reaction to local anesthetic, air embolism, arterial puncture, stroke, heart attack.       Patient verbalized understanding of risks and benefits of the right heart catheterization and has elected to proceed with right heart catheterization.       Vandana DUMONT, CAMERON  [facility] Interventional Cardiology  [phone]

## 2020-08-05 NOTE — ED TRIAGE NOTES
Pt presents to ED via EMS with c/o choking on pills.  He reports pills feel stuck in his airway and burns like acid.  Speech clear.

## 2020-08-05 NOTE — PHARMACY-ANTICOAGULATION SERVICE
Clinical Pharmacy - Warfarin Dosing Consult     Pharmacy has been consulted to manage this patient s warfarin therapy.  Indication: LVAD/RVAD  Therapy Goal: INR 2-3  Warfarin Prior to Admission: Yes  Warfarin PTA Regimen: 5mg Tu, Th, Sa and 7.5mg the rest of the week, anticoag clinic note from 7/28 said 5mg twice a week. However patient reports he is taking 5mg three times a week, last dose 8/4  Significant drug interactions: amiodarone, allopurinol, aspirin, citalopram, pantoprazole, prednisone, tramadol    INR   Date Value Ref Range Status   08/05/2020 3.85 (H) 0.86 - 1.14 Final   08/05/2020 4.01 (H) 0.86 - 1.14 Final       Recommend warfarin 2.5 mg today. Opted for lower dose instead of holding given LVAD.  Pharmacy will monitor Jim Willingham daily and order warfarin doses to achieve specified goal.      Please contact pharmacy as soon as possible if the warfarin needs to be held for a procedure or if the warfarin goals change.      Renuka Ross, PharmD  Pager: [phone]

## 2020-08-06 ENCOUNTER — ANCILLARY PROCEDURE (OUTPATIENT)
Dept: CARDIOLOGY | Facility: CLINIC | Age: 65
DRG: 683 | End: 2020-08-06
Attending: STUDENT IN AN ORGANIZED HEALTH CARE EDUCATION/TRAINING PROGRAM
Payer: COMMERCIAL

## 2020-08-06 ENCOUNTER — APPOINTMENT (OUTPATIENT)
Dept: OCCUPATIONAL THERAPY | Facility: CLINIC | Age: 65
DRG: 683 | End: 2020-08-06
Attending: STUDENT IN AN ORGANIZED HEALTH CARE EDUCATION/TRAINING PROGRAM
Payer: COMMERCIAL

## 2020-08-06 LAB
ANION GAP SERPL CALCULATED.3IONS-SCNC: 7 MMOL/L (ref 3–14)
ANION GAP SERPL CALCULATED.3IONS-SCNC: 9 MMOL/L (ref 3–14)
BUN SERPL-MCNC: 59 MG/DL (ref 7–30)
BUN SERPL-MCNC: 68 MG/DL (ref 7–30)
CALCIUM SERPL-MCNC: 7.9 MG/DL (ref 8.5–10.1)
CALCIUM SERPL-MCNC: 8 MG/DL (ref 8.5–10.1)
CHLORIDE SERPL-SCNC: 102 MMOL/L (ref 94–109)
CHLORIDE SERPL-SCNC: 97 MMOL/L (ref 94–109)
CO2 SERPL-SCNC: 22 MMOL/L (ref 20–32)
CO2 SERPL-SCNC: 23 MMOL/L (ref 20–32)
CREAT SERPL-MCNC: 2.1 MG/DL (ref 0.66–1.25)
CREAT SERPL-MCNC: 2.59 MG/DL (ref 0.66–1.25)
ERYTHROCYTE [DISTWIDTH] IN BLOOD BY AUTOMATED COUNT: 17 % (ref 10–15)
GFR SERPL CREATININE-BSD FRML MDRD: 25 ML/MIN/{1.73_M2}
GFR SERPL CREATININE-BSD FRML MDRD: 32 ML/MIN/{1.73_M2}
GLUCOSE BLDC GLUCOMTR-MCNC: 120 MG/DL (ref 70–99)
GLUCOSE BLDC GLUCOMTR-MCNC: 142 MG/DL (ref 70–99)
GLUCOSE BLDC GLUCOMTR-MCNC: 172 MG/DL (ref 70–99)
GLUCOSE BLDC GLUCOMTR-MCNC: 193 MG/DL (ref 70–99)
GLUCOSE SERPL-MCNC: 120 MG/DL (ref 70–99)
GLUCOSE SERPL-MCNC: 205 MG/DL (ref 70–99)
HCT VFR BLD AUTO: 35.1 % (ref 40–53)
HGB BLD-MCNC: 11.3 G/DL (ref 13.3–17.7)
INR PPP: 4.34 (ref 0.86–1.14)
LDH SERPL L TO P-CCNC: 342 U/L (ref 85–227)
MAGNESIUM SERPL-MCNC: 2.4 MG/DL (ref 1.6–2.3)
MAGNESIUM SERPL-MCNC: 2.5 MG/DL (ref 1.6–2.3)
MCH RBC QN AUTO: 27.9 PG (ref 26.5–33)
MCHC RBC AUTO-ENTMCNC: 32.2 G/DL (ref 31.5–36.5)
MCV RBC AUTO: 87 FL (ref 78–100)
PLATELET # BLD AUTO: 211 10E9/L (ref 150–450)
POTASSIUM SERPL-SCNC: 4.1 MMOL/L (ref 3.4–5.3)
POTASSIUM SERPL-SCNC: 4.2 MMOL/L (ref 3.4–5.3)
RBC # BLD AUTO: 4.05 10E12/L (ref 4.4–5.9)
SODIUM SERPL-SCNC: 129 MMOL/L (ref 133–144)
SODIUM SERPL-SCNC: 131 MMOL/L (ref 133–144)
WBC # BLD AUTO: 9.2 10E9/L (ref 4–11)

## 2020-08-06 PROCEDURE — 25000132 ZZH RX MED GY IP 250 OP 250 PS 637: Performed by: NURSE PRACTITIONER

## 2020-08-06 PROCEDURE — 40000895 ZZH STATISTIC SLP IP EVAL DEFER

## 2020-08-06 PROCEDURE — 83615 LACTATE (LD) (LDH) ENZYME: CPT | Performed by: STUDENT IN AN ORGANIZED HEALTH CARE EDUCATION/TRAINING PROGRAM

## 2020-08-06 PROCEDURE — 97165 OT EVAL LOW COMPLEX 30 MIN: CPT | Mod: GO

## 2020-08-06 PROCEDURE — 21400000 ZZH R&B CCU UMMC

## 2020-08-06 PROCEDURE — 99232 SBSQ HOSP IP/OBS MODERATE 35: CPT | Mod: 25 | Performed by: NURSE PRACTITIONER

## 2020-08-06 PROCEDURE — 93283 PRGRMG EVAL IMPLANTABLE DFB: CPT

## 2020-08-06 PROCEDURE — 25000131 ZZH RX MED GY IP 250 OP 636 PS 637: Performed by: STUDENT IN AN ORGANIZED HEALTH CARE EDUCATION/TRAINING PROGRAM

## 2020-08-06 PROCEDURE — 25800030 ZZH RX IP 258 OP 636: Performed by: NURSE PRACTITIONER

## 2020-08-06 PROCEDURE — 25800030 ZZH RX IP 258 OP 636: Performed by: INTERNAL MEDICINE

## 2020-08-06 PROCEDURE — 93283 PRGRMG EVAL IMPLANTABLE DFB: CPT | Mod: 26 | Performed by: INTERNAL MEDICINE

## 2020-08-06 PROCEDURE — 85610 PROTHROMBIN TIME: CPT | Performed by: STUDENT IN AN ORGANIZED HEALTH CARE EDUCATION/TRAINING PROGRAM

## 2020-08-06 PROCEDURE — 00000146 ZZHCL STATISTIC GLUCOSE BY METER IP

## 2020-08-06 PROCEDURE — 25000132 ZZH RX MED GY IP 250 OP 250 PS 637: Performed by: STUDENT IN AN ORGANIZED HEALTH CARE EDUCATION/TRAINING PROGRAM

## 2020-08-06 PROCEDURE — 93750 INTERROGATION VAD IN PERSON: CPT | Performed by: NURSE PRACTITIONER

## 2020-08-06 PROCEDURE — 85027 COMPLETE CBC AUTOMATED: CPT | Performed by: STUDENT IN AN ORGANIZED HEALTH CARE EDUCATION/TRAINING PROGRAM

## 2020-08-06 PROCEDURE — 97110 THERAPEUTIC EXERCISES: CPT | Mod: GO

## 2020-08-06 PROCEDURE — 80048 BASIC METABOLIC PNL TOTAL CA: CPT | Performed by: STUDENT IN AN ORGANIZED HEALTH CARE EDUCATION/TRAINING PROGRAM

## 2020-08-06 PROCEDURE — 83735 ASSAY OF MAGNESIUM: CPT | Performed by: STUDENT IN AN ORGANIZED HEALTH CARE EDUCATION/TRAINING PROGRAM

## 2020-08-06 PROCEDURE — 36415 COLL VENOUS BLD VENIPUNCTURE: CPT | Performed by: STUDENT IN AN ORGANIZED HEALTH CARE EDUCATION/TRAINING PROGRAM

## 2020-08-06 RX ORDER — ACETAMINOPHEN 325 MG/1
650 TABLET ORAL EVERY 6 HOURS PRN
Status: DISCONTINUED | OUTPATIENT
Start: 2020-08-06 | End: 2020-08-07 | Stop reason: HOSPADM

## 2020-08-06 RX ADMIN — PREDNISONE 20 MG: 20 TABLET ORAL at 19:52

## 2020-08-06 RX ADMIN — HYDRALAZINE HYDROCHLORIDE 20 MG: 10 TABLET, FILM COATED ORAL at 08:27

## 2020-08-06 RX ADMIN — ACETAMINOPHEN 650 MG: 325 TABLET, FILM COATED ORAL at 15:50

## 2020-08-06 RX ADMIN — SODIUM CHLORIDE: 9 INJECTION, SOLUTION INTRAVENOUS at 10:18

## 2020-08-06 RX ADMIN — SODIUM CHLORIDE: 9 INJECTION, SOLUTION INTRAVENOUS at 00:18

## 2020-08-06 RX ADMIN — AMIODARONE HYDROCHLORIDE 400 MG: 200 TABLET ORAL at 08:27

## 2020-08-06 RX ADMIN — GABAPENTIN 300 MG: 300 CAPSULE ORAL at 19:51

## 2020-08-06 RX ADMIN — SODIUM CHLORIDE 500 ML: 9 INJECTION, SOLUTION INTRAVENOUS at 13:05

## 2020-08-06 RX ADMIN — UMECLIDINIUM 1 PUFF: 62.5 AEROSOL, POWDER ORAL at 08:28

## 2020-08-06 RX ADMIN — MONTELUKAST 10 MG: 10 TABLET, FILM COATED ORAL at 21:03

## 2020-08-06 RX ADMIN — PANTOPRAZOLE SODIUM 40 MG: 40 TABLET, DELAYED RELEASE ORAL at 08:28

## 2020-08-06 RX ADMIN — HYDRALAZINE HYDROCHLORIDE 20 MG: 10 TABLET, FILM COATED ORAL at 19:52

## 2020-08-06 RX ADMIN — BENZOCAINE AND MENTHOL 1 LOZENGE: 15; 3.6 LOZENGE ORAL at 15:38

## 2020-08-06 RX ADMIN — FLUTICASONE FUROATE AND VILANTEROL TRIFENATATE 1 PUFF: 200; 25 POWDER RESPIRATORY (INHALATION) at 08:28

## 2020-08-06 RX ADMIN — CITALOPRAM HYDROBROMIDE 20 MG: 20 TABLET, FILM COATED ORAL at 19:52

## 2020-08-06 RX ADMIN — GABAPENTIN 300 MG: 300 CAPSULE ORAL at 08:26

## 2020-08-06 RX ADMIN — HYDRALAZINE HYDROCHLORIDE 20 MG: 10 TABLET, FILM COATED ORAL at 13:46

## 2020-08-06 RX ADMIN — ASPIRIN 81 MG CHEWABLE TABLET 81 MG: 81 TABLET CHEWABLE at 08:27

## 2020-08-06 RX ADMIN — GABAPENTIN 300 MG: 300 CAPSULE ORAL at 13:46

## 2020-08-06 RX ADMIN — ALLOPURINOL 100 MG: 100 TABLET ORAL at 08:28

## 2020-08-06 RX ADMIN — SODIUM CHLORIDE: 9 INJECTION, SOLUTION INTRAVENOUS at 17:28

## 2020-08-06 ASSESSMENT — ACTIVITIES OF DAILY LIVING (ADL)
ADLS_ACUITY_SCORE: 10

## 2020-08-06 NOTE — UTILIZATION REVIEW
"  Admission Status; Secondary Review Determination         Under the authority of the Utilization Management Committee, the utilization review process indicated a secondary review on the above patient.  The review outcome is based on review of the medical records, discussions with staff, and applying clinical experience noted on the date of the review.        (X)      Inpatient Status Appropriate - This patient's medical care is consistent with medical management for inpatient care and reasonable inpatient medical practice.      () Observation Status Appropriate - This patient does not meet hospital inpatient criteria and is placed in observation status. If this patient's primary payer is Medicare and was admitted as an inpatient, Condition Code 44 should be used and patient status changed to \"observation\".   () Admission Status NOT Appropriate - This patient's medical care is not consistent with medical management for Inpatient or Observation Status.          RATIONALE FOR DETERMINATION     65 year old male with a past medical history including CKD Stage III, DM Type II, CECILIA, HTN, COPD, Asthma, and NICM with EF 20% s/p LVAD HM II 6/16/17 as DT due to obesity. His postoperative course was complicated by arrhythmias, WALTER, and left small pleural effusions. He presented to the ED with complaints of difficulty swallowing and acute onset of pain and shortness of breath when attempting to take his medication.  CMP on admission showed hyponatremia, hyperkalemia, and WALTER.   His blood pressure was 72 systolic.  Patient was given IV fluids for slow correction of his sodium.  Speech therapy was consulted.  Patient is appropriate for inpatient status.    The severity of illness, intensity of service provided, expected LOS and risk for adverse outcome make the care complex, high risk and appropriate for hospital admission.        The information on this document is developed by the utilization review team in order for the " business office to ensure compliance.  This only denotes the appropriateness of proper admission status and does not reflect the quality of care rendered.         The definitions of Inpatient Status and Observation Status used in making the determination above are those provided in the CMS Coverage Manual, Chapter 1 and Chapter 6, section 70.4.      Sincerely,     Nba Dumont MD  Physician Advisor  Utilization Review/ Case Management  [facility].

## 2020-08-06 NOTE — PLAN OF CARE
Discharge Planner SLP   Patient plan for discharge: Hopeful for home tomorrow  Current status: Chart reviewed and discussed SLP consult with pt and RN. Both have reported good tolerance of regular diet since admission. Pt reports admission is for aspiration of a pill, which quickly resolved after receiving a GI cocktail. Pt states typically he takes 10 pills at a time, but states he will decrease this to 3 small ones and 1 large one at a time. 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 His chest xray is negative for suspected aspiration pneumonia and pt denies a hx of recurrent pneumonia.     Complete SLP evaluation not warranted, pt and SLP discussed general safe swallow strategies and recommendation for pt to complete an outpatient video swallow w/ esophagram should swallowing be an ongoing concern.     Barriers to return to prior living situation: None per SLP  Recommendations for discharge: Home  Rationale for recommendations: OP SLP For video swallow w/ esophagram should swallowing be an ongoing issue.        Entered by: Kateryna Kay 08/06/2020 2:45 PM

## 2020-08-06 NOTE — PLAN OF CARE
D: Patient initially presented to the ED 8/5 for c/o difficulty swallowing and acute onset of pain and shortness of breath when attempting to take his medications s/p GI cocktail with improvement in symptoms. CMP concerning for hyponatremia, hyperkalemia, and WALTER s/p RHC which revealed hypovolemia. Pt admitted following RHC for IV fluids and lab monitoring. Hx. CKD 3, DM 2, CECILIA, HTN, COPD, Asthma, NICM s/p LVAD HM II 6/2017 as DT d/t obesity, s/p gastric bypass, PAF, GERD, gout.  I/A: A&Ox4. VSS on RA/Cpap at HS. Afebrile. SR 60s-80s (w/first degree AVB and occasional PVCs). LVAD #s wnl, no alarms. Denies pain. NS infusing at 100 mL/hr. Pt deferred BG check overnight. Adequate uop. Up ad francisco. Appeared to rest comfortably overnight.   P: Device interrogation ordered for today. Continue to monitor and notify Cards 2 with questions/concerns.

## 2020-08-06 NOTE — PHARMACY-ANTICOAGULATION SERVICE
Warfarin Therapy Hold Note  This patient is currently receiving warfarin for LVAD.    Goal INR:  2-3.      Anticoagulation Dose History     Recent Dosing and Labs Latest Ref Rng & Units 7/14/2020 7/21/2020 7/28/2020 8/5/2020 8/5/2020 8/5/2020 8/6/2020    Warfarin 2.5 mg - - - - - - 2.5 mg -    INR 0.86 - 1.14 3.57(H) 3.14(H) 2.08(H) 4.01(H) 3.85(H) - 4.34(H)    INR 0.86 - 1.14 - - - - - - -    INR Point of Care 0.86 - 1.14 - - - - - - -            Bleeding Signs/Symptoms:  None    Assessment:  Current INR level is supratherapeutic. This is most likely due to nutritional changes     Plan:  1) HOLD today s warfarin dose.   An order has been placed in EPIC for  Warfarin- No Dose Today    2) Do not recommend reversal with vitamin K or FFP at this time.   3) Recheck next INR tomorrow with AM labs    The primary team has been contacted about the above plan/primary team is aware of need to hold dose    Steve Lofton, Pharm D Student

## 2020-08-06 NOTE — PLAN OF CARE
[id number]  Patient is a 65 year old male admitted for swallowing concerns/aspiration given GI cocktail, found to be hypovolemic concerned with hyponatremia, hyperkalemia and WALTER s/p RHC. PMHx of CKD 3, DM 2, CECILIA, HTN, COPD, Asthma, NICM s/p LVAD HM II 6/2017 as DT d/t obesity, s/p gastric bypass, PAF, GERD, gout.CKD 3, DM 2, CECILIA, HTN, COPD, Asthma, NICM s/p LVAD HM II 6/2017 as DT d/t obesity, s/p gastric bypass, PAF, GERD, gout.    Neuro: AOx4, calm/accepting  Cardiac: denies cardiac symptoms  Telemetry: SR in 70s  LVAD #s WNL-no alarms during this shift.  Respiratory: RA, denies SOB/cough  GI/: voiding without difficulty.   Endocrine: BG checks ACHS with s/s insulin  Diet/Appetite: good appetite, regular diet.  Skin: no skin concerns, LVAD dressing weekly due on Monday.  LDA: PIV x1 with NS running @ 100ml/hr  Activity: up independent, steady on feet  Pain: denies  Plan: NS infusing, monitor labs. Likely discharge tomorrow?Contacts cards 2 care team with questions or concerns.

## 2020-08-06 NOTE — PLAN OF CARE
Discharge Planner OT   Patient plan for discharge: Home w/A  Current status: Pt ambulates while pushing IV pole w/SBA, trips over IV pole x 2. Completes 5 minutes on NuStep with VSS throughout. Educated on progression of activity, encouraged pt to be out walking in halls.   Barriers to return to prior living situation: Medical status, deconditioning.   Recommendations for discharge: Home w/A  Rationale for recommendations: Pt is near functional baseline. A for heavy ADL/IADL PRN.        Entered by: Tara Yan 08/06/2020 12:14 PM     OT/CR 6C

## 2020-08-06 NOTE — PROGRESS NOTES
"CLINICAL NUTRITION SERVICES    Reason for Assessment:  Low-sodium (2 g/day) nutrition education, received consult    Diet History:  Per RD note 1/22/20: \"Received nutrition consult to educate pt on 2 g sodium diet (automatic check on order set).  Per pt, understands this education, has had previously, and desires no further nutrition education at this time.Patient has had poor appetite x  1 week per his report.\"    Pt discussion with pt 8/6, pt has received low-sodium nutrition education in the past. Note, pt with LVAD in place.    Nutrition Diagnosis:  No nutrition education dx at this time.     Nutrition Prescription/Recs:  Diet order as per team. Monitor need for a low-sodium diet and fluid restriction, pending fluid status    Interventions:  Nutrition Education: Offered verbal and written nutrition education. Pt declines need for additional nutrition education as he has received nutrition education in the past.     Goals:    Pt will verbalize at least five high sodium foods and the importance of avoiding added salt to foods for cooking or seasoning foods.     Follow-up:   Patient to ask any further nutrition-related questions before discharge. In addition, pt may request outpatient RD appointment.     Sana Costello, MS, RD, LD, [facility]   6C Pgr: [phone]            "

## 2020-08-06 NOTE — PROGRESS NOTES
D: Called patient for routine virtual VAD Coordinator rounding (r/t COVID-19). Pt did not answer, left voicemail stating to call back if any questions or concerns.    Addendum: pt called back. He reports everything is going well in the hospital. He does have some concerns about follow-up plans - restarting meds, clinic appts, labs. Sent message to Cards 2 team to address/discuss.

## 2020-08-06 NOTE — PLAN OF CARE
Pt accepted in transfer from 6D. Has a HM 2 with #s WNL. Had a R heart cath today, #s were low. Given a 500 ml NS fluid bolus and now at 100 ml/hr. NA+ 12. Waiting for him to urinate. A&O x 4. LVAD driveline dressing on weekly schedule. Denies pain/SOB. SR 60s-90s with 0-8 PVCs.

## 2020-08-06 NOTE — PROGRESS NOTES
08/06/20 1124   Quick Adds   Type of Visit Initial Occupational Therapy Evaluation   Living Environment   Lives With spouse  (MICHAELA, 7 y/o granddaughter)   Living Arrangements house   Home Accessibility stairs to enter home;stairs within home   Transportation Anticipated car, drives self;family or friend will provide   Living Environment Comment Pt has 1 SHERLYN, then 8-10 steps split level with railings on R hand side. Pt does more sponge baths more than showers with LVAD. Tub shower.    Self-Care   Usual Activity Tolerance good   Current Activity Tolerance moderate   Regular Exercise Other (see comments)   Equipment Currently Used at Home   (has a cane which he used when he was dizzy)   Activity/Exercise/Self-Care Comment Pt is retired. Wife works as an RT at a children's hospital. Pt also worked as an RT for 26 years. Pt reports feeling well up until admission. Keeps busy chasing after 6 year old granddaughter and tries to do stairs as exercise. Has been stuck inside with covid issues, not able to do typical exercises.    Functional Level   Ambulation 0-->independent   Transferring 0-->independent   Toileting 0-->independent   Bathing 0-->independent   Dressing 0-->independent   Eating 0-->independent   Communication 0-->understands/communicates without difficulty   Swallowing 0-->swallows foods/liquids without difficulty   Cognition 0 - no cognition issues reported   Fall history within last six months yes   Number of times patient has fallen within last six months 1  (in bath tub)   Which of the above functional risks had a recent onset or change?   (risk for deconditioning)   Prior Functional Level Comment Pt totally I with ADL/mobility.    General Information   Onset of Illness/Injury or Date of Surgery - Date 08/05/20   Referring Physician Xiomara Carrera MD    Patient/Family Goals Statement To get a heart transplant   Additional Occupational Profile Info/Pertinent History of Current Problem Jim LOZA  Maulik is a 65 year old male with a past medical history including CKD Stage III, DM Type II, CECILIA, HTN, COPD, Asthma, and NICM with EF 20% s/p LVAD HM II 6/16/17 as DT due to obesity. He presented with concern for aspiration with WALTER.    Precautions/Limitations no known precautions/limitations   Heart Disease Risk Factors Lack of physical activity;Overweight;Medical history;Gender;Age;Race   General Info Comments Activity: up ad francisco   Cognitive Status Examination   Orientation orientation to person, place and time   Cognitive Comment Cognition appears grossly intact   Sensory Examination   Sensory Comments Pt has neuropathy in feet, which is baseline. States he may also have carpal tunnel.    Range of Motion (ROM)   ROM Comment B UE is WNL, pt has rotator cuff injuries and some days has reduced ROM   Strength   Strength Comments Strength is functional    Hand Strength   Hand Strength Comments Strong grasp bilaterally   Instrumental Activities of Daily Living (IADL)   Previous Responsibilities meal prep;shopping;medication management;finances;driving;   IADL Comments Hasn't been able to go out into the community with Covid. Family does most of the cleaning and laundry. Has assistance with yardwork.    Activities of Daily Living Analysis   Impairments Contributing to Impaired Activities of Daily Living strength decreased  (deconditioning)   General Therapy Interventions   Planned Therapy Interventions IADL retraining;strengthening;home program guidelines;progressive activity/exercise   Clinical Impression   Criteria for Skilled Therapeutic Interventions Met yes, treatment indicated   OT Diagnosis Decreased functional endurance   Influenced by the following impairments deconditioning, SOB   Assessment of Occupational Performance 3-5 Performance Deficits   Identified Performance Deficits bathing, functional endurance, home management   Clinical Decision Making (Complexity) Low complexity   Therapy Frequency  "3x/week   Predicted Duration of Therapy Intervention (days/wks) 1 week   Anticipated Discharge Disposition Home with Assist   Risks and Benefits of Treatment have been explained. Yes   Patient, Family & other staff in agreement with plan of care Yes   Clinical Impression Comments Pt to benefit from skilled OT to address above deficits. See daily flowsheet for details regarding tx provided.    [facility] AM-PAC  \"6 Clicks\" Daily Activity Inpatient Short Form   1. Putting on and taking off regular lower body clothing? 4 - None   2. Bathing (including washing, rinsing, drying)? 3 - A Little   3. Toileting, which includes using toilet, bedpan or urinal? 4 - None   4. Putting on and taking off regular upper body clothing? 4 - None   5. Taking care of personal grooming such as brushing teeth? 4 - None   6. Eating meals? 4 - None   Daily Activity Raw Score (Score out of 24.Lower scores equate to lower levels of function) 23   Total Evaluation Time   Total Evaluation Time (Minutes) 5     "

## 2020-08-06 NOTE — PROGRESS NOTES
[facility]   Cardiology II Service / Advanced Heart Failure  Device Interrogation Note  Date of Service: 8/6/2020    The patient's HeartMate LVAD was interrogated 8/6/2020  * Speed 9400 rpm   * Pulsatility index 6  * Power 5.8 Manuel   * Flow 5.3 L/minute   Fluid status: hypovolemic   Alarms were reviewed, and notable for rare PI events as low as 4.3.   The driveline exit site was covered with dressing clean, dry, and intact.   All external components were inspected and showed no evidence of damage or malfunction.    Soraya Marshall, TIM CNP  8/6/2020

## 2020-08-07 ENCOUNTER — DOCUMENTATION ONLY (OUTPATIENT)
Dept: ANTICOAGULATION | Facility: CLINIC | Age: 65
End: 2020-08-07

## 2020-08-07 VITALS
HEIGHT: 68 IN | WEIGHT: 234.1 LBS | SYSTOLIC BLOOD PRESSURE: 101 MMHG | TEMPERATURE: 98.6 F | DIASTOLIC BLOOD PRESSURE: 89 MMHG | BODY MASS INDEX: 35.48 KG/M2 | OXYGEN SATURATION: 99 % | RESPIRATION RATE: 16 BRPM | HEART RATE: 60 BPM

## 2020-08-07 DIAGNOSIS — I50.22 CHRONIC SYSTOLIC CONGESTIVE HEART FAILURE (H): ICD-10-CM

## 2020-08-07 DIAGNOSIS — Z95.811 LVAD (LEFT VENTRICULAR ASSIST DEVICE) PRESENT (H): ICD-10-CM

## 2020-08-07 DIAGNOSIS — Z79.01 LONG TERM CURRENT USE OF ANTICOAGULANT THERAPY: ICD-10-CM

## 2020-08-07 LAB
ANION GAP SERPL CALCULATED.3IONS-SCNC: 6 MMOL/L (ref 3–14)
BUN SERPL-MCNC: 48 MG/DL (ref 7–30)
CALCIUM SERPL-MCNC: 8 MG/DL (ref 8.5–10.1)
CHLORIDE SERPL-SCNC: 103 MMOL/L (ref 94–109)
CO2 SERPL-SCNC: 23 MMOL/L (ref 20–32)
CREAT SERPL-MCNC: 1.83 MG/DL (ref 0.66–1.25)
GFR SERPL CREATININE-BSD FRML MDRD: 38 ML/MIN/{1.73_M2}
GLUCOSE BLDC GLUCOMTR-MCNC: 127 MG/DL (ref 70–99)
GLUCOSE BLDC GLUCOMTR-MCNC: 226 MG/DL (ref 70–99)
GLUCOSE SERPL-MCNC: 245 MG/DL (ref 70–99)
INR PPP: 2.14 (ref 0.86–1.14)
LDH SERPL L TO P-CCNC: 372 U/L (ref 85–227)
MAGNESIUM SERPL-MCNC: 2.5 MG/DL (ref 1.6–2.3)
POTASSIUM SERPL-SCNC: 4.6 MMOL/L (ref 3.4–5.3)
POTASSIUM SERPL-SCNC: 4.7 MMOL/L (ref 3.4–5.3)
SODIUM SERPL-SCNC: 131 MMOL/L (ref 133–144)
SODIUM SERPL-SCNC: 132 MMOL/L (ref 133–144)

## 2020-08-07 PROCEDURE — 83615 LACTATE (LD) (LDH) ENZYME: CPT | Performed by: STUDENT IN AN ORGANIZED HEALTH CARE EDUCATION/TRAINING PROGRAM

## 2020-08-07 PROCEDURE — 25000132 ZZH RX MED GY IP 250 OP 250 PS 637: Performed by: STUDENT IN AN ORGANIZED HEALTH CARE EDUCATION/TRAINING PROGRAM

## 2020-08-07 PROCEDURE — 83735 ASSAY OF MAGNESIUM: CPT | Performed by: STUDENT IN AN ORGANIZED HEALTH CARE EDUCATION/TRAINING PROGRAM

## 2020-08-07 PROCEDURE — 84295 ASSAY OF SERUM SODIUM: CPT | Performed by: STUDENT IN AN ORGANIZED HEALTH CARE EDUCATION/TRAINING PROGRAM

## 2020-08-07 PROCEDURE — 84132 ASSAY OF SERUM POTASSIUM: CPT | Performed by: STUDENT IN AN ORGANIZED HEALTH CARE EDUCATION/TRAINING PROGRAM

## 2020-08-07 PROCEDURE — 25000132 ZZH RX MED GY IP 250 OP 250 PS 637: Performed by: NURSE PRACTITIONER

## 2020-08-07 PROCEDURE — 85610 PROTHROMBIN TIME: CPT | Performed by: STUDENT IN AN ORGANIZED HEALTH CARE EDUCATION/TRAINING PROGRAM

## 2020-08-07 PROCEDURE — 00000146 ZZHCL STATISTIC GLUCOSE BY METER IP

## 2020-08-07 PROCEDURE — 80048 BASIC METABOLIC PNL TOTAL CA: CPT | Performed by: STUDENT IN AN ORGANIZED HEALTH CARE EDUCATION/TRAINING PROGRAM

## 2020-08-07 PROCEDURE — 36415 COLL VENOUS BLD VENIPUNCTURE: CPT | Performed by: STUDENT IN AN ORGANIZED HEALTH CARE EDUCATION/TRAINING PROGRAM

## 2020-08-07 PROCEDURE — 99239 HOSP IP/OBS DSCHRG MGMT >30: CPT | Performed by: INTERNAL MEDICINE

## 2020-08-07 RX ORDER — INSULIN LISPRO 100 [IU]/ML
1-7 INJECTION, SOLUTION INTRAVENOUS; SUBCUTANEOUS 4 TIMES DAILY
Qty: 3 ML | Refills: 1 | Status: ON HOLD | OUTPATIENT
Start: 2020-08-07 | End: 2021-05-31

## 2020-08-07 RX ORDER — TRAMADOL HYDROCHLORIDE 50 MG/1
100 TABLET ORAL EVERY 6 HOURS PRN
Status: DISCONTINUED | OUTPATIENT
Start: 2020-08-07 | End: 2020-08-07 | Stop reason: HOSPADM

## 2020-08-07 RX ORDER — NALOXONE HYDROCHLORIDE 0.4 MG/ML
.1-.4 INJECTION, SOLUTION INTRAMUSCULAR; INTRAVENOUS; SUBCUTANEOUS
Status: DISCONTINUED | OUTPATIENT
Start: 2020-08-07 | End: 2020-08-07 | Stop reason: HOSPADM

## 2020-08-07 RX ORDER — WARFARIN SODIUM 5 MG/1
TABLET ORAL
Qty: 90 TABLET | Refills: 5 | Status: ON HOLD | COMMUNITY
Start: 2020-08-07 | End: 2020-09-27

## 2020-08-07 RX ORDER — HYDRALAZINE HYDROCHLORIDE 10 MG/1
30 TABLET, FILM COATED ORAL 3 TIMES DAILY
Status: DISCONTINUED | OUTPATIENT
Start: 2020-08-07 | End: 2020-08-07 | Stop reason: HOSPADM

## 2020-08-07 RX ORDER — BENZOCAINE AND MENTHOL, UNSPECIFIED FORM 15; 2.3 MG/1; MG/1
1 LOZENGE ORAL
Qty: 32 LOZENGE | Refills: 0 | Status: SHIPPED | OUTPATIENT
Start: 2020-08-07 | End: 2020-09-11

## 2020-08-07 RX ORDER — WARFARIN SODIUM 7.5 MG/1
7.5 TABLET ORAL
Status: DISCONTINUED | OUTPATIENT
Start: 2020-08-07 | End: 2020-08-07 | Stop reason: HOSPADM

## 2020-08-07 RX ORDER — HYDRALAZINE HYDROCHLORIDE 10 MG/1
30 TABLET, FILM COATED ORAL 3 TIMES DAILY
Qty: 545 TABLET | Refills: 3 | COMMUNITY
Start: 2020-08-07 | End: 2020-10-13

## 2020-08-07 RX ADMIN — BENZOCAINE AND MENTHOL 1 LOZENGE: 15; 3.6 LOZENGE ORAL at 00:52

## 2020-08-07 RX ADMIN — BENZOCAINE AND MENTHOL 1 LOZENGE: 15; 3.6 LOZENGE ORAL at 06:16

## 2020-08-07 RX ADMIN — ACETAMINOPHEN 650 MG: 325 TABLET, FILM COATED ORAL at 08:23

## 2020-08-07 RX ADMIN — BENZOCAINE AND MENTHOL 1 LOZENGE: 15; 3.6 LOZENGE ORAL at 10:57

## 2020-08-07 RX ADMIN — PANTOPRAZOLE SODIUM 40 MG: 40 TABLET, DELAYED RELEASE ORAL at 07:43

## 2020-08-07 RX ADMIN — UMECLIDINIUM 1 PUFF: 62.5 AEROSOL, POWDER ORAL at 07:52

## 2020-08-07 RX ADMIN — TRAMADOL HYDROCHLORIDE 100 MG: 50 TABLET, FILM COATED ORAL at 10:57

## 2020-08-07 RX ADMIN — ASPIRIN 81 MG CHEWABLE TABLET 81 MG: 81 TABLET CHEWABLE at 07:43

## 2020-08-07 RX ADMIN — FLUTICASONE FUROATE AND VILANTEROL TRIFENATATE 1 PUFF: 200; 25 POWDER RESPIRATORY (INHALATION) at 07:52

## 2020-08-07 RX ADMIN — GABAPENTIN 300 MG: 300 CAPSULE ORAL at 07:43

## 2020-08-07 RX ADMIN — AMIODARONE HYDROCHLORIDE 400 MG: 200 TABLET ORAL at 07:43

## 2020-08-07 RX ADMIN — HYDRALAZINE HYDROCHLORIDE 20 MG: 10 TABLET, FILM COATED ORAL at 07:44

## 2020-08-07 RX ADMIN — HYDRALAZINE HYDROCHLORIDE 30 MG: 10 TABLET, FILM COATED ORAL at 13:42

## 2020-08-07 RX ADMIN — GABAPENTIN 300 MG: 300 CAPSULE ORAL at 13:42

## 2020-08-07 RX ADMIN — ALLOPURINOL 100 MG: 100 TABLET ORAL at 07:44

## 2020-08-07 ASSESSMENT — ACTIVITIES OF DAILY LIVING (ADL)
ADLS_ACUITY_SCORE: 10

## 2020-08-07 ASSESSMENT — MIFFLIN-ST. JEOR: SCORE: 1831.37

## 2020-08-07 NOTE — PROGRESS NOTES
[facility]   Spoke with patient to discuss plans for home care. Patient to be discharged home 8/7/20 and has agreed to have [facility] follow with services of RN.  Patient care support center processing referral.  Patient verbalized understanding that initial visit is scheduled for 8/10/20.   Provided 24 hour phone number for [facility] for any questions or concerns.    Debi Meier RN  [facility] Liaison  [phone]

## 2020-08-07 NOTE — PLAN OF CARE
DISCHARGE   Discharged to: Home  Via: Automobile  Accompanied by: Family- wife picked him up downstairs ay 14:00  Discharge Instructions: diet, activity, medications, follow up appointments, when to call the MD, and what to watchout for (i.e. s/s of infection, increasing SOB, palpitations, chest pain,)  Prescriptions: To be filled by discharge pharmacy per pt's request; medication list reviewed & sent with pt  Follow Up Appointments: arranged; information given  Belongings: All sent with pt  IV: out  Telemetry: off  Pt exhibits understanding of above discharge instructions; all questions answered.  Discharge Paperwork: faxed     Patient left unit 6C at 13:50. Rhythm was SR 50-70's with 0-6 PVC's/min, rare PVC couplet, and had a run of PAT at 10:54 at 120 BPM

## 2020-08-07 NOTE — PLAN OF CARE
D: Patient initially presented to the ED 8/5 for c/o difficulty swallowing and acute onset of pain and shortness of breath when attempting to take his medications s/p GI cocktail with improvement in symptoms. CMP concerning for hyponatremia, hyperkalemia, and WALTER s/p RHC which revealed hypovolemia. Pt admitted following RHC for IV fluids and lab monitoring. Hx. CKD 3, DM 2, CECILIA, HTN, COPD, Asthma, NICM s/p LVAD HM II 6/2017 as DT d/t obesity, s/p gastric bypass, PAF, GERD, gout.  I/A: A&Ox4. VSS on RA/Cpap at HS. Afebrile. SR 70s-90s (w/first degree AVB and occasional PVCs). LVAD #s wnl, no alarms. Denies pain. C/o sore throat partially relieved by prn lozenge. NS gtt stopped per orders. Pt deferred BG check overnight. Adequate uop. Up ad francisco. Appeared to rest comfortably overnight.   P: Likely discharge home today. Continue to monitor and notify Cards 2 with questions/concerns.

## 2020-08-07 NOTE — PROGRESS NOTES
ANTICOAGULATION  MANAGEMENT: Discharge Review    Jim Willingham chart reviewed for anticoagulation continuity of care    Hospital Admission on 8/5-8/7 for rising kidney function and found to be dehydated.    Discharge disposition: Home with Home Care  [facility].    Results:    Recent labs: (last 7 days)     08/05/20  0752 08/05/20  0956 08/06/20  0544 08/07/20  0601   INR 4.01* 3.85* 4.34* 2.14*     Anticoagulation inpatient management:     See antonio    Anticoagulation discharge instructions:     Warfarin dosing: See antonio   Bridging: No   INR goal change: No      Medication changes affecting anticoagulation: Yes: Amiodarone was changed to 400mg daily, patient is also on prednisone.    Additional factors affecting anticoagulation: No    Plan     Agree with dosing adjustment on discharge    Patient not contacted    Anticoagulation Calendar updated    Maru Alonso RN

## 2020-08-07 NOTE — DISCHARGE SUMMARY
[facility]   Cardiology II Service / Advanced Heart Failure  Discharge Summary     Jim Willingham MRN# [id number]   YOB: 1955 Age: 65 year old     DATE OF ADMISSION:  8/5/2020  DATE OF DISCHARGE:  8/7/2020  ADMITTING PROVIDER: Rose Conte MD  DISCHARGE PROVIDER: Soraya WICK/Dr. Seth  PRIMARY PROVIDER:   Jovany Salas    ADMIT DIAGNOSES:   1. Dysphagia with concern for aspiration  2. WALTER on CKD Stage III secondary to intravascular hypovolemia  3. Hyperkalemia and Hyponatremia, resolved    DISCHARGE DIAGNOSES:   1. Chronic SCHF secondary to NICM s/p HM II  2. PAF  3. HTN  4. DM Type II, Uncontrolled     HPI: Please see the detailed H & P by Soraya Marshall NP from 8/5/2020. Jim Willingham is a 65 year old male with a past medical history including CKD Stage III, DM Type II, CECILIA, HTN, COPD, Asthma, and NICM with EF 20% s/p LVAD HM II 6/16/17 as DT due to obesity. His postoperative course was complicated by arrhythmias, WALTER, and left small pleural effusions. He presents to ED today with complaints of difficulty swallowing and acute onset of pain and shortness of breath when attempting to take his medication. He states shortly after presentation in ED he coughed up a small white pill with substantial improvement in his symptoms. He further received a GI cocktail with improvement in symptoms as well. He denies fever, chills, palpitations, nausea, vomiting, diarrhea, melena, hematochezia, abdominal bloating, LE edema, or concerns at his drive line site.      Upon presentation to ED he underwent Chest X-ray and CT scan, which returned negative. CMP on admission with acute concerns for hyponatremia, hyperkalemia, and WALTER. He was planned to undergo RHC outpatient due to progressively worsening renal function. Losartan discontinued outpatient with minimal improvement.     PHYSICAL EXAM:  Blood pressure 101/89, pulse 60, temperature 98.6  F (37  C), temperature source Oral,  "resp. rate 16, height 1.727 m (5' 8\"), weight 106.2 kg (234 lb 1.6 oz), SpO2 99 %.  GENERAL: Appears alert and oriented times three.   HEENT: Eye symmetrical and free of discharge bilaterally. Mucous membranes moist and without lesions.  NECK: Supple and without lymphadenopathy. JVD 8 cm.   CV: RRR, S1S2 present with LVAD hum.   RESPIRATORY: Respirations regular, even, and unlabored. Lungs CTA throughout.   GI: Soft and non distended with normoactive bowel sounds present in all quadrants. No tenderness, rebound, guarding. No organomegaly.   EXTREMITIES: No peripheral edema. 2+ bilateral pedal pulses.   NEUROLOGIC: Alert and orientated x 3. CN II-XII grossly intact. No focal deficits.   MUSCULOSKELETAL: No joint swelling or tenderness.   SKIN: No jaundice. No rashes or lesions. LVAD drive line covered.     LABS:   Last CBC:   Recent Labs   Lab Test 08/06/20  0544   WBC 9.2   RBC 4.05*   HGB 11.3*   HCT 35.1*   MCV 87   MCH 27.9   MCHC 32.2   RDW 17.0*          Last CMP:  Recent Labs   Lab Test 08/07/20  0753 08/07/20  0601  08/05/20  0335   * 132*   < > 122*   POTASSIUM 4.7 4.6   < > 5.4*   CHLORIDE  --  103   < > 91*   QAMAR  --  8.0*   < > 8.7   CO2  --  23   < > 23   BUN  --  48*   < > 74*   CR  --  1.83*   < > 3.33*   GLC  --  245*   < > 268*   AST  --   --   --  34   ALT  --   --   --  34   BILITOTAL  --   --   --  0.9   ALBUMIN  --   --   --  4.0   PROTTOTAL  --   --   --  7.5   ALKPHOS  --   --   --  71    < > = values in this interval not displayed.       IMAGING:  Results for orders placed or performed during the hospital encounter of 08/05/20   XR Chest 2 Views    Narrative    EXAM: XR CHEST 2 VIEW  LOCATION: [facility]  DATE/TIME: 08/05/2020, 3:51 AM    INDICATION: Chest pain.  COMPARISON: 06/24/2020.      Impression    IMPRESSION: Postop sternotomy. Left-sided cardiac pacer. Left ventricular assist device. Mild cardiac enlargement. Pulmonary vascularity within normal limits. No " acute infiltrates or consolidation. Aortic calcification. Remainder of the exam is   unremarkable. No acute findings and no change compared to the prior study.     Chest CT w/o contrast    Narrative    EXAM: CT CHEST WITHOUT CONTRAST  LOCATION: [facility]  DATE/TIME: 2020, 4:40 AM    INDICATION: Ongoing left chest pain, symptoms concerning for aspiration.  COMPARISON: None.  TECHNIQUE: CT chest without IV contrast. Multiplanar reformats were obtained. Dose reduction techniques were used.  CONTRAST: None.    FINDINGS:   LUNGS AND PLEURA: No focal consolidation. No pneumothorax or pleural effusion.    MEDIASTINUM/AXILLAE: Mildly moderately enlarged heart. LVAD device in situ. Status post median sternotomy. Left subclavian approach pacer/AICD.    UPPER ABDOMEN: No significant finding.    MUSCULOSKELETAL: Unremarkable.      Impression    IMPRESSION:   1.  No focal consolidation to suggest aspiration or pneumonia.  2.  Status post median sternotomy with LVAD device in situ.  3.  Atherosclerotic vascular disease and coronary artery disease.     Echo Complete    Narrative    [id number]  [id number]  [id number]  [id number]^GERALDINE^THOMAS^KERWIN           [facility],[place]  Echocardiography Laboratory  [address] [place] [id number]     Name: LOIS FULLER  MRN: [id number]  : 1955  Study Date: 2020 08:37 AM  Age: 65 yrs  Gender: Male  Patient Location: [facility]  Reason For Study: CHF  Ordering Physician: THOMAS CHANDLER  Performed By: Afshin Benoit RDCS     BSA: 2.2 m2  Height: 68 in  Weight: 240 lb  HR: 81  _____________________________________________________________________________  __        Procedure  LVAD Echocardiogram with two-dimensional, color and spectral Doppler  performed.  _____________________________________________________________________________  __        Interpretation Summary  LVAD cannula was seen in the expected anatomic position in the  LV apex.  HM2 at 9400RPM.  LVIDd 61mm.  Septum normal.  Aortic valve remain closed.  Normal flow velocities.  Global right ventricular function is mildly to moderately reduced.  The inferior vena cava is normal.  No pericardial effusion is present.  _____________________________________________________________________________  __        Left Ventricle  The Ejection Fraction is estimated at 20-25%. Diastolic function not assessed  due to presence of LVAD. LVAD cannula was seen in the expected anatomic  position in the LV apex. HM2 at 9400RPM.  LVIDd 61mm.  Septum normal.  Aortic valve remain closed.  Normal flow velocities.     Right Ventricle  Mild right ventricular dilation is present. Global right ventricular function  is mildly to moderately reduced. A pacemaker lead is noted in the right  ventricle.     Atria  The atria cannot be assessed.        Mitral Valve  Mild mitral insufficiency is present.     Aortic Valve  No aortic regurgitation is present.     Tricuspid Valve  Mild to moderate tricuspid insufficiency is present. The right ventricular  systolic pressure is approximated at 13.7 mmHg plus the right atrial pressure.     Pulmonic Valve  The pulmonic valve cannot be assessed.     Vessels  The thoracic aorta cannot be assessed. The pulmonary artery cannot be  assessed. The inferior vena cava is normal.     Pericardium  No pericardial effusion is present.     _____________________________________________________________________________  __  MMode/2D Measurements & Calculations  IVSd: 0.92 cm  LVIDd: 6.1 cm  LVIDs: 5.4 cm  LVPWd: 1.1 cm     FS: 10.9 %  LV mass(C)d: 263.8 grams  LV mass(C)dI: 119.4 grams/m2  RWT: 0.38        Doppler Measurements & Calculations  TR max sloane: 185.3 cm/sec  TR max P.7 mmHg     _____________________________________________________________________________  __           Report approved by: Shayne Richardson 2020 09:15 AM      Cardiac Catheterization    Narrative    Low to  normal biventricular filling pressures  Normal pulmonary pressures  Reduced cardiac output in the setting of LVAD and speed of 9400RPM. No   speed changes made given low wedge. Consider fluid administration   Cardiac Device Check - Inpatient     Value    Date Time Interrogation Session [id number]    Implantable Pulse Generator  Medtronic    Implantable Pulse Generator Model [id number] Viva XT CRT-D    Implantable Pulse Generator Serial Number [id number]    Type Interrogation Session In Clinic    Clinic Name [facility]    Implantable Pulse Generator Type Cardiac Resynchronization Therapy - Defibrillator    Implantable Pulse Generator Implant Date 20130906    Implantable Lead  Medtronic    Implantable Lead Model 4193 Attain OTW    Implantable Lead Serial Number [id number]    Implantable Lead Implant Date 20080306    Implantable Lead Polarity Type Unipolar Lead    Implantable Lead Location Detail 1 UNKNOWN    Implantable Lead Location Left Ventricle    Implantable Lead  Medtronic    Implantable Lead Model 5076 CapSureFix Novus MRI SureScan    Implantable Lead Serial Number [id number]    Implantable Lead Implant Date 20080306    Implantable Lead Polarity Type Bipolar Lead    Implantable Lead Location Detail 1 UNKNOWN    Implantable Lead Location Right Atrium    Implantable Lead  Medtronic    Implantable Lead Model 6947M Sprint Quattro Secure MRI SureScan    Implantable Lead Serial Number [id number]    Implantable Lead Implant Date 20080306    Implantable Lead Polarity Type Quadripolar Lead    Implantable Lead Location Detail 1 UNKNOWN    Implantable Lead Location Right Ventricle    Krishan Setting Mode (NBG Code) AAIR    Krishan Setting Lower Rate Limit 50    Krishan Setting Maximum Sensor Rate 130    Lead Channel Setting Sensing Polarity Bipolar    Lead Channel Setting Sensing Anode Location Right Atrium    Lead Channel Setting Sensing Anode Terminal  Ring    Lead Channel Setting Sensing Cathode Location Right Atrium    Lead Channel Setting Sensing Cathode Terminal Tip    Lead Channel Setting Sensing Sensitivity 0.45    Lead Channel Setting Sensing Polarity Bipolar    Lead Channel Setting Sensing Anode Location Right Ventricle    Lead Channel Setting Sensing Anode Terminal Ring    Lead Channel Setting Sensing Cathode Location Right Ventricle    Lead Channel Setting Sensing Cathode Terminal Tip    Lead Channel Setting Sensing Sensitivity 0.3    Ventricular chambers paced during CRT pacing. RVOnly    Lead Channel Setting Pacing Polarity Bipolar    Lead Channel Setting Pacing Anode Location Right Atrium    Lead Channel Setting Pacing Anode Terminal Ring    Lead Channel Setting Sensing Cathode Location Right Atrium    Lead Channel Setting Sensing Cathode Terminal Tip    Lead Channel Setting Pacing Pulse Width 0.4    Lead Channel Setting Pacing Amplitude 3    Lead Channel Setting Pacing Polarity Bipolar    Lead Channel Setting Pacing Anode Location Right Ventricle    Lead Channel Setting Pacing Anode Terminal Ring    Lead Channel Setting Sensing Cathode Location Right Ventricle    Lead Channel Setting Sensing Cathode Terminal Tip    Lead Channel Setting Pacing Pulse Width 0.4    Lead Channel Setting Pacing Amplitude 2.5    Lead Channel Setting Pacing Polarity Bipolar    Lead Channel Setting Pacing Anode Location Left Ventricle    Lead Channel Setting Pacing Anode Terminal Ring    Lead Channel Setting Sensing Cathode Location Left Ventricle    Lead Channel Setting Sensing Cathode Terminal Tip    Zone Setting Type Category VF    Zone Setting Detection Interval 270    Zone Setting Detection Beats Numerator 30    Zone Setting Detection Beats Denominator 40    Zone Setting Type Category VT    Zone Setting Detection Interval 240    Zone Setting Type Category VT    Zone Setting Detection Interval 360    Zone Setting Type Category VT    Zone Setting Detection Interval 430     Zone Setting Type Category ATRIAL_FIBRILLATION    Zone Setting Type Category AT/AF    Zone Setting Detection Interval 400    Lead Channel Impedance Value 494    Lead Channel Sensing Intrinsic Amplitude 2.625    Lead Channel Sensing Intrinsic Amplitude 4.25    Lead Channel Pacing Threshold Amplitude 1.5    Lead Channel Pacing Threshold Pulse Width 0.4    Lead Channel Impedance Value 380    Lead Channel Impedance Value 266    Lead Channel Sensing Intrinsic Amplitude 6.375    Lead Channel Sensing Intrinsic Amplitude 6.875    Lead Channel Pacing Threshold Amplitude 1.25    Lead Channel Pacing Threshold Pulse Width 0.4    Lead Channel Impedance Value 4,047    Lead Channel Impedance Value 494    Lead Channel Impedance Value 4,047    Lead Channel Pacing Threshold Amplitude 0.625    Lead Channel Pacing Threshold Pulse Width 0.4    Battery Date Time of Measurements 20200806072651    Battery Status OK    Battery RRT Trigger 2.727    Battery Remaining Longevity 7    Battery Voltage 2.89    Capacitor Charge Type Reformation    Capacitor Last Charge Date Time 20200524091733    Capacitor Charge Time 4.524    Capacitor Charge Energy 18    Capacitor Charge Type Shock    Capacitor Last Charge Date Time 06808883706428    Capacitor Charge Time 9.929    Capacitor Charge Energy 35    Krishan Statistic Date Time Start 20200624072652    Krishan Statistic Date Time End 20200806072553    Krishan Statistic RA Percent Paced 0.07    Krishan Statistic RV Percent Paced 0    CRT Statistic LV Percent Paced 0    Krishan Statistic AP  Percent 0    Krishan Statistic AS  Percent 0    Krishan Statistic AP VS Percent 0.07    Krishan Statistic AS VS Percent 99.93    CRT Statistic Date Time Start 20200624072652    CRT Statistic Date Time End 20200806072553    CRT Statistic CRT Percent Paced 0    Atrial Tachy Statistic Date Time Start 20200624072652    Atrial Tachy Statistic Date Time End 20200806072553    Atrial Tachy Statistic AT/AF [place] Percent 0    Therapy  Statistic Recent Shocks Delivered 0    Therapy Statistic Recent Shocks Aborted 0    Therapy Statistic Recent ATP Delivered 0    Therapy Statistic Recent Date Time Start 20200624072652    Therapy Statistic Recent Date Time End 20200806072553    Therapy Statistic Total Shocks Delivered 4    Therapy Statistic Total Shocks Aborted 3    Therapy Statistic Total ATP Delivered 14    Therapy Statistic Total  Date Time Start [id number]    Therapy Statistic Total  Date Time End 20200806072553    Episode Statistic Recent Count 0    Episode Statistic Type Category AT/AF    Episode Statistic Recent Count 0    Episode Statistic Type Category SVT    Episode Statistic Recent Count 0    Episode Statistic Type Category VT    Episode Statistic Recent Count 0    Episode Statistic Type Category VF    Episode Statistic Recent Count 0    Episode Statistic Type Category VT    Episode Statistic Recent Count 0    Episode Statistic Type Category VT    Episode Statistic Recent Count 0    Episode Statistic Type Category VT    Episode Statistic Recent Date Time Start 20200624072652    Episode Statistic Recent Date Time End 20200806072553    Episode Statistic Recent Date Time Start 20200624072652    Episode Statistic Recent Date Time End 20200806072553    Episode Statistic Recent Date Time Start 20200624072652    Episode Statistic Recent Date Time End 20200806072553    Episode Statistic Recent Date Time Start 20200624072652    Episode Statistic Recent Date Time End 20200806072553    Episode Statistic Recent Date Time Start 20200624072652    Episode Statistic Recent Date Time End 20200806072553    Episode Statistic Recent Date Time Start 20200624072652    Episode Statistic Recent Date Time End 20200806072553    Episode Statistic Recent Date Time Start 20200624072652    Episode Statistic Recent Date Time End 20200806072553    Episode Statistic Total Count 292    Episode Statistic Type Category AT/AF    Episode Statistic Total Count 2    Episode  Statistic Type Category SVT    Episode Statistic Total Count 189    Episode Statistic Type Category VT    Episode Statistic Total Count 19    Episode Statistic Type Category VF    Episode Statistic Total Count 0    Episode Statistic Type Category VT    Episode Statistic Total Count 0    Episode Statistic Type Category VT    Episode Statistic Total Count 19    Episode Statistic Type Category VT    Episode Statistic Total Date Time Start 20130906105740    Episode Statistic Total Date Time End 20200806072553    Episode Statistic Total Date Time Start 20130906105740    Episode Statistic Total Date Time End 20200806072553    Episode Statistic Total Date Time Start 20130906105740    Episode Statistic Total Date Time End 20200806072553    Episode Statistic Total Date Time Start 20130906105740    Episode Statistic Total Date Time End 20200806072553    Episode Statistic Total Date Time Start 20130906105740    Episode Statistic Total Date Time End 20200806072553    Episode Statistic Total Date Time Start 20130906105740    Episode Statistic Total Date Time End 20200806072553    Episode Statistic Total Date Time Start 20130906105740    Episode Statistic Total Date Time End 20200806072553    Narrative    Patient seen on 6C for evaluation and iterative programming of his Medtronic dual lead ICD per MD orders. Normal ICD function. No arrhythmias recorded. Intrinsic rhythm = NSR with 1st degree AVB @ 73 bpm. AP = <0.1%.  = 0%. OptiVol fluid index is near baseline. Estimated battery longevity to MARVA = 7 months. No short v-v intervals recorded. Lead trends appear stable. NINO Owen, RN    Dual lead ICD    I have reviewed and interpreted the device interrogation, settings, programming and nurse's summary. The device is functioning within normal device parameters. I agree with the current findings, assessment and plan.       PROCEDURES:  [facility] 8/5/20:   Time  Systolic  Diastolic  Mean  A Wave  V Wave  EDP  Max dp/dt  HR    RA Pressures    3:41 PM    6 mmHg     7 mmHg     7 mmHg       107 bpm       RV Pressures   3:42 PM  24 mmHg         6 mmHg      153 bpm       PA Pressures   3:43 PM  23 mmHg     11 mmHg     15 mmHg         156 bpm       PCW Pressures   3:42 PM    4 mmHg     4 mmHg     5 mmHg       153 bpm       Blood Flow Results Phase: Baseline      Time  Results  Indexed Values    QP   3:32 PM  4.05 L/min     1.84 L/min/m2       QS   3:32 PM  4.05 L/min     1.84 L/min/m2       Blood Oximetry Phase: Baseline      Time  Hb  SAT(%)  PO2  Content  PA Sat    PA   3:32 PM   58.3 %       58.3 %       Art   3:32 PM   99 %      17.64 mL/dL        Cardiac Output Phase: Baseline      Time  TDCO  TDCI  Michelle C.O.  Michelle C.I.  Michelle HR    Cardiac Output Results   3:32 PM  4.03 L/min     1.83 L/min/m2     4.05 L/min     1.84 L/min/m2            HOSPITAL COURSE:   Jim was admitted with concern for aspiration and found to have WALTER on admission with Cr 3.21, weight 228 lbs on admission. Chest X-ray and CT negative for aspiration with resolution of symptoms. He underwent RHC consistent with hypovolemia and received IV fluids. Echo consistent with AV closed and IVC collapsing. RHC 8/4 consistent with mRA-6, RV-24/6, mPA-15, mPCW-4, MICHELLE CO-4.05, and MICHELLE CI-1.84. Aldactone, KCL, and Bumex held during hospitalization. He will remain off Bumex at discharge. Following weight gain of 3 lbs he will resume Bumex 1 mg po BID. He will remain off Aldactone in the future given hyperkalemia and WALTER. His weight at discharge is 234 lbs.      Dysphagia with concern for aspiration. Imaging negative for aspiration. Monitor symptoms closely. Speech consult appreciated.      WALTER on CKD Stage III with Hyperkalemia and Hyponatremia. Concern for hypovolemia. Cr-3.21, NA-125, and K-4.7. RHC consistent with hypovolemia. Bumex, Aldactone, and KCL discontinued. Improving with fluids. Na-131, K 4.6, Cr-1.83 at discharge.      Chronic SCHF secondary to NICM s/p HM II. Implanted 6/17. Echo  consistent with AV closed and IVC collapsing. RHC 8/4 consistent with mRA-6, RV-24/6, mPA-15, mPCW-4, MICHELLE CO-4.05, and MICHELLE CI-1.84.    Stage D, NYHA Class IIIB  ACEi/ARB Hydralazine 20 mg po TID.   BB Not on PTA  Aldosterone antagonist contraindicated due to renal dysfunction  SCD prophylaxis ICD  Fluid status hypovolemic  MAP: 74  LDH: 372  Anticoagulation: Pharmacy to dose Coumadin, Goal 2-3, 2.14. He will discharge on Coumadin 7.5 mg today, 5 mg Saturday, 7.5 mg on Sunday and have repeat INR per [facility] RN on Monday.   Antiplatelet: ASA 81 mg po daily      PAF. Coumadin as above. Continue Amiodarone      DM Type II, Uncontrolled. Hgb A1C 7.5. Novolog medium intensity sliding scale insulin. Discontinue Metformin, not a candidate for this at discharge due to CKD. PCP notified of Metformin recommendations and he was discharged on QID sliding scale insulin.      HTN. MAP stable at 74.     Chronic Medical Conditions:  Depression. Continue Celexa.   GERD. Continue Protonix.   Gout. Continue Allopurinol.   Obesity s/p Gastric Bypass with Chronic anemia.   Asthma. Continue home inhalers.     DISCHARGE MEDICATIONS:  Discharge Medication List as of 8/7/2020 11:31 AM      START taking these medications    Details   Benzocaine-Menthol (CEPACOL) 15-2.3 MG LOZG Take 1 lozenge by mouth every hour as needed (sore throat), Disp-32 lozenge,R-0, E-Prescribe         CONTINUE these medications which have CHANGED    Details   hydrALAZINE (APRESOLINE) 10 MG tablet Take 3 tablets (30 mg) by mouth 3 times daily, Disp-545 tablet,R-3, Historical      insulin lispro (HUMALOG KWIKPEN) 100 UNIT/ML (1 unit dial) KWIKPEN Inject 1-7 Units Subcutaneous 4 times daily, Disp-3 mL,R-1, E-Prescribe      warfarin ANTICOAGULANT (COUMADIN ANTICOAGULANT) 5 MG tablet 7.5 mg tonight, 5 mg on Saturday, 7.5 mg on Sunday. Repeat INR Monday., Disp-90 tablet,R-5, Historical         CONTINUE these medications which have NOT CHANGED    Details   acetaminophen  (TYLENOL) 325 MG tablet Take 650 mg by mouth every 6 hours as needed for mild pain, Historical      allopurinol (ZYLOPRIM) 100 MG tablet Take 1 tablet (100 mg) by mouth daily, Disp-60 tablet, R-5, E-Prescribe      amiodarone (PACERONE) 200 MG tablet Take 400 mg by mouth daily, Historical      aspirin 81 MG chewable tablet 1 tablet (81 mg) by Oral or Feeding Tube route daily, Disp-36 tablet, OTC      citalopram (CELEXA) 20 MG tablet Take 20 mg by mouth At Bedtime , Historical      fluticasone-vilanterol (BREO ELLIPTA) 200-25 MCG/INH oral inhaler Inhale 1 puff into the lungs daily, Historical      gabapentin (NEURONTIN) 300 MG capsule Take 1 capsule (300 mg) by mouth twice daily and 2 capsules (600 mg) by mouth at bedtime daily., Historical      montelukast (SINGULAIR) 10 MG tablet Take 10 mg by mouth At Bedtime, Historical      pantoprazole (PROTONIX) 40 MG EC tablet Take 1 tablet (40 mg) by mouth every morning, Disp-60 tablet,R-5, E-Prescribe      predniSONE (DELTASONE) 20 MG tablet Take 1 tablet (20 mg) by mouth daily, Disp-14 tablet, R-0, Historical      traMADol (ULTRAM) 50 MG tablet Take 2 tablets (100 mg) by mouth every 6 hours as needed for moderate pain or breakthrough pain, Disp-28 tablet, Transitional      umeclidinium (INCRUSE ELLIPTA) 62.5 MCG/INH oral inhaler Inhale 1 puff into the lungs daily, Historical      zinc sulfate (ZINCATE) 220 (50 Zn) MG capsule Take 220 mg by mouth 2 times daily, Historical      albuterol (ALBUTEROL) 108 (90 BASE) MCG/ACT Inhaler Inhale 2 puffs into the lungs every 4 hours as needed for shortness of breath / dyspnea or wheezing, Historical      blood glucose monitoring (ONE TOUCH ULTRA 2) meter device kit Use to test blood sugar four times daily or as directed.Disp-1 kit, [id number]      blood glucose VI test strip Use to test blood sugar four times daily or as directed., Disp-200 each, R-0, E-Prescribe      cyanocobalamin (VITAMIN B12) 1000 MCG/ML injection Inject 1,000  mcg into the muscle every 30 days, Historical      insulin pen needle (32G X 4 MM) 32G X 4 MM miscellaneous Use four pen needles daily or as directed.Disp-110 each, [id number]         STOP taking these medications       bumetanide (BUMEX) 1 MG tablet Comments:   Reason for Stopping:         metFORMIN (GLUCOPHAGE) 500 MG tablet Comments:   Reason for Stopping:         potassium chloride ER (K-TAB/KLOR-CON) 10 MEQ CR tablet Comments:   Reason for Stopping:         spironolactone (ALDACTONE) 25 MG tablet Comments:   Reason for Stopping:               DISCHARGE DISPOSITION: Jim Willingham will discharge to home in stable condition.     DISCHARGE INSTRUCTIONS:  Discharge Procedure Orders   Home care nursing referral   Referral Priority: Routine Referral Type: Home Health Therapies & Aides   Number of Visits Requested: 1     Reason for your hospital stay   Order Comments: You were admitted to the hospital with concern for rising kidney function and found to be dehydrated. You received aggressive IV fluids and will discharge to home off your water pills. Please notify your LVAD coordinator upon 3 lb weight gain for resumption of diuretics. Notify the LVAD team for fever, chills, increased abdominal bloating, increased swelling in your feet, and worsening shortness of breath.     [facility] Follow-up and recommended labs and tests   Order Comments: Video visit on Monday. Repeat CBC, INR, CMP, and LDH on Monday with C RN.     Appointments on [place] and/or [facility] (with [facility] or [facility] provider or service). Call [phone] if you haven't heard regarding these appointments within 7 days of discharge.     Activity   Order Comments: Your activity upon discharge: activity as tolerated     Order Specific Question Answer Comments   Is discharge order? Yes      Wound care and dressings   Order Comments: Instructions to care for your wound at home: weekly LVAD dressing change.     MD face to face encounter    Order Comments: Documentation of Face to Face and Certification for Home Health Services    I certify that patient: Jim Willingham is under my care and that I, or a nurse practitioner or physician's assistant working with me, had a face-to-face encounter that meets the physician face-to-face encounter requirements with this patient on: 8/7/2020.    This encounter with the patient was in whole, or in part, for the following medical condition, which is the primary reason for home health care: WALTER, dysphagia with concern for aspiration, LVAD    I certify that, based on my findings, the following services are medically necessary home health services: Nursing.    My clinical findings support the need for the above services because:     Further, I certify that my clinical findings support that this patient is homebound (i.e. absences from home require considerable and taxing effort and are for medical reasons or [employer] services or infrequently or of short duration when for other reasons) because: Requires assistance of another person or specialized equipment to access medical services.    Based on the above findings. I certify that this patient is confined to the home and needs intermittent skilled nursing care, physical therapy and/or speech therapy.  The patient is under my care, and I have initiated the establishment of the plan of care.  This patient will be followed by a physician who will periodically review the plan of care.  Physician/Provider to provide follow up care: Jovany Salas    Attending hospital physician (the Medicare certified [place] provider): Nicola Seth MD  Physician Signature: See electronic signature associated with these discharge orders.  Date: 8/7/2020     Full Code     Order Specific Question Answer Comments   Code status determined by: Discussion with patient/ legal decision maker      Diet   Order Comments: Follow this diet upon discharge: -2 gram sodium and 2 Liter fluid  restriction     Order Specific Question Answer Comments   Is discharge order? Yes        45 minutes spent in discharge, including >50% in counseling and coordination of care, medication review and plan of care recommended on follow up. Please do not hesitate to contact me should there be questions regarding the hospital course or discharge plan.      TIM Sood CNP FNP-C  8/7/2020  [phone]

## 2020-08-07 NOTE — PROGRESS NOTES
Care Coordinator - Discharge Planning    Admission Date/Time:  8/5/2020  Attending MD:  Rose Conte MD     Data  Chart reviewed, discussed with interdisciplinary team.   Patient was admitted for:   1. Streptococcal sore throat    2. Hyponatremia    3. Chronic kidney disease, unspecified CKD stage    4. Pill esophagitis    5. Chronic systolic congestive heart failure (H)    6. LVAD (left ventricular assist device) present (H)    7. 2019-nCoV not detected    8. Type 2 diabetes mellitus with diabetic autonomic neuropathy, without long-term current use of insulin (H)    9. Sore throat      Assessment   Concerns with insurance coverage for discharge needs: None.  Current Living Situation: Patient lives with spouse and 6 year old great granddaughter  Support System: Supportive and Involved  Services Involved: [facility]  Transportation at Discharge: Family or friend will provide  Transportation to Medical Appointments: Spouse    Coordination of Care and Referrals: Provided patient/family with options for home care. Per NP, pt would benefit from having home nursing arranged upon discharge. Pt states that he has used [facility] in the past and would like to use them again if able, declined a list for additional options. [facility] liason updated and they submitted for insurance auth.     Intervention  Referral made to [facility] (Ph: [phone]) for RN evaluation post hospitalization. Assess vital signs, respiratory and cardiac status, activity tolerance, hydration, nutritional status, med setup and management. INR lab draws with results to [facility].       Plan  Anticipated Discharge Date: 8/7/2020  Anticipated Discharge Plan: Discharge to home with home care  CC will continue to monitor patient's medical condition and progress towards discharge.  Rin Ceballos RN BSN  6C Unit Care Coordinator  Phone number: [phone]  Pager: [phone]

## 2020-08-07 NOTE — PLAN OF CARE
Occupational Therapy Discharge Summary    Reason for therapy discharge:    Discharged to home with home therapy.    Progress towards therapy goal(s). See goals on Care Plan in [facility] electronic health record for goal details.  Goals partially met.  Barriers to achieving goals:   discharge from facility.    Therapy recommendation(s):    Continued therapy is recommended.  Rationale/Recommendations:  to increase activity tolerance.

## 2020-08-10 ENCOUNTER — TELEPHONE (OUTPATIENT)
Dept: TRANSPLANT | Facility: CLINIC | Age: 65
End: 2020-08-10

## 2020-08-10 ENCOUNTER — MEDICAL CORRESPONDENCE (OUTPATIENT)
Dept: HEALTH INFORMATION MANAGEMENT | Facility: CLINIC | Age: 65
End: 2020-08-10

## 2020-08-10 ENCOUNTER — PATIENT OUTREACH (OUTPATIENT)
Dept: CARE COORDINATION | Facility: CLINIC | Age: 65
End: 2020-08-10

## 2020-08-10 LAB
ALBUMIN SERPL-MCNC: 3.7 G/DL (ref 3.4–5)
ALP SERPL-CCNC: 69 U/L (ref 40–150)
ALT SERPL W P-5'-P-CCNC: 39 U/L (ref 0–70)
ANION GAP SERPL CALCULATED.3IONS-SCNC: 8 MMOL/L (ref 3–14)
AST SERPL W P-5'-P-CCNC: 39 U/L (ref 0–45)
BILIRUB SERPL-MCNC: 0.8 MG/DL (ref 0.2–1.3)
BUN SERPL-MCNC: 29 MG/DL (ref 7–30)
CALCIUM SERPL-MCNC: 8.8 MG/DL (ref 8.5–10.1)
CHLORIDE SERPL-SCNC: 100 MMOL/L (ref 94–109)
CO2 SERPL-SCNC: 24 MMOL/L (ref 20–32)
CREAT SERPL-MCNC: 1.86 MG/DL (ref 0.66–1.25)
ERYTHROCYTE [DISTWIDTH] IN BLOOD BY AUTOMATED COUNT: 17 % (ref 10–15)
GFR SERPL CREATININE-BSD FRML MDRD: 38 ML/MIN/{1.73_M2}
GLUCOSE SERPL-MCNC: 107 MG/DL (ref 70–99)
HCT VFR BLD AUTO: 35.5 % (ref 40–53)
HGB BLD-MCNC: 11.6 G/DL (ref 13.3–17.7)
INR PPP: 1.8 (ref 0.86–1.14)
LDH SERPL L TO P-CCNC: 490 U/L (ref 85–227)
MCH RBC QN AUTO: 29 PG (ref 26.5–33)
MCHC RBC AUTO-ENTMCNC: 32.7 G/DL (ref 31.5–36.5)
MCV RBC AUTO: 89 FL (ref 78–100)
PLATELET # BLD AUTO: 248 10E9/L (ref 150–450)
POTASSIUM SERPL-SCNC: 4.4 MMOL/L (ref 3.4–5.3)
PROT SERPL-MCNC: 7 G/DL (ref 6.8–8.8)
RBC # BLD AUTO: 4 10E12/L (ref 4.4–5.9)
SODIUM SERPL-SCNC: 132 MMOL/L (ref 133–144)
WBC # BLD AUTO: 9.7 10E9/L (ref 4–11)

## 2020-08-10 PROCEDURE — 85027 COMPLETE CBC AUTOMATED: CPT | Performed by: INTERNAL MEDICINE

## 2020-08-10 PROCEDURE — 85610 PROTHROMBIN TIME: CPT | Performed by: INTERNAL MEDICINE

## 2020-08-10 PROCEDURE — 83615 LACTATE (LD) (LDH) ENZYME: CPT | Performed by: INTERNAL MEDICINE

## 2020-08-10 PROCEDURE — 80053 COMPREHEN METABOLIC PANEL: CPT | Performed by: INTERNAL MEDICINE

## 2020-08-10 NOTE — PROGRESS NOTES
[facility]: Post-Discharge Note  SITUATION                                                      Admission:    Admission Date: 08/05/20   Reason for Admission: Dysphagia with concern for aspiration  Discharge:   Discharge Date: 08/07/20  Discharge Diagnosis: Dysphagia with concern for aspiration    BACKGROUND                                                      Please see the detailed H & P by Soraya Marshall NP from 8/5/2020. Jim Willingham is a 65 year old male with a past medical history including CKD Stage III, DM Type II, CECILIA, HTN, COPD, Asthma, and NICM with EF 20% s/p LVAD HM II 6/16/17 as DT due to obesity. His postoperative course was complicated by arrhythmias, WALTER, and left small pleural effusions. He presents to ED today with complaints of difficulty swallowing and acute onset of pain and shortness of breath when attempting to take his medication. He states shortly after presentation in ED he coughed up a small white pill with substantial improvement in his symptoms. He further received a GI cocktail with improvement in symptoms as well. He denies fever, chills, palpitations, nausea, vomiting, diarrhea, melena, hematochezia, abdominal bloating, LE edema, or concerns at his drive line site    ASSESSMENT      Discharge Assessment  Patient reports symptoms are: Improved  Does the patient have all of their medications?: Yes  Does patient know what their new medications are for?: Yes  Does patient have a follow-up appointment scheduled?: Yes  Does patient have any other questions or concerns?: No    Post-op  Did the patient have surgery or a procedure: No  Fever: No  Chills: No  Eating & Drinking: eating and drinking without complaints/concerns  PO Intake: regular diet  Bowel Function: normal  Urinary Status: voiding without complaint/concerns        PLAN                                                      Outpatient Plan:  Video visit on Monday. Repeat CBC, INR, CMP, and LDH on Monday with  C RN.     Future Appointments   Date Time Provider Department Center   8/18/2020 12:00 AM  ICD REMOTE CVSV [facility]   8/19/2020  1:00 PM  [facility]   8/19/2020  2:00 PM Didi Butler PA-C [facility]   8/31/2020  9:30 AM Chase Qureshi MD [facility]   12/16/2020 12:00 AM  ICD REMOTE [facility]   12/18/2020 10:00 AM  LAB [facility]   12/18/2020 11:00 AM Delisa [facility] MD [facility]           Cristal Jacobs, [facility]

## 2020-08-10 NOTE — TELEPHONE ENCOUNTER
New Referral-Transplant  08/10/20    Sent message to patient in regards to transplant evaluation, left message to discuss plan of care for transplant.  Asked patient to call transplant coordinator at [phone] (option 2) as soon as able.

## 2020-08-11 ENCOUNTER — TELEPHONE (OUTPATIENT)
Dept: CARE COORDINATION | Facility: CLINIC | Age: 65
End: 2020-08-11

## 2020-08-11 ENCOUNTER — ANTICOAGULATION THERAPY VISIT (OUTPATIENT)
Dept: ANTICOAGULATION | Facility: CLINIC | Age: 65
End: 2020-08-11

## 2020-08-11 ENCOUNTER — CARE COORDINATION (OUTPATIENT)
Dept: CARDIOLOGY | Facility: CLINIC | Age: 65
End: 2020-08-11

## 2020-08-11 DIAGNOSIS — I50.22 CHRONIC SYSTOLIC CONGESTIVE HEART FAILURE (H): ICD-10-CM

## 2020-08-11 DIAGNOSIS — Z95.811 LVAD (LEFT VENTRICULAR ASSIST DEVICE) PRESENT (H): ICD-10-CM

## 2020-08-11 DIAGNOSIS — Z79.01 LONG TERM CURRENT USE OF ANTICOAGULANT THERAPY: ICD-10-CM

## 2020-08-11 LAB
MDC_IDC_LEAD_IMPLANT_DT: NORMAL
MDC_IDC_LEAD_LOCATION: NORMAL
MDC_IDC_LEAD_LOCATION_DETAIL_1: NORMAL
MDC_IDC_LEAD_MFG: NORMAL
MDC_IDC_LEAD_MODEL: NORMAL
MDC_IDC_LEAD_POLARITY_TYPE: NORMAL
MDC_IDC_LEAD_SERIAL: NORMAL
MDC_IDC_MSMT_BATTERY_DTM: NORMAL
MDC_IDC_MSMT_BATTERY_REMAINING_LONGEVITY: 7 MO
MDC_IDC_MSMT_BATTERY_RRT_TRIGGER: 2.73
MDC_IDC_MSMT_BATTERY_STATUS: NORMAL
MDC_IDC_MSMT_BATTERY_VOLTAGE: 2.89 V
MDC_IDC_MSMT_CAP_CHARGE_DTM: NORMAL
MDC_IDC_MSMT_CAP_CHARGE_DTM: NORMAL
MDC_IDC_MSMT_CAP_CHARGE_ENERGY: 18 J
MDC_IDC_MSMT_CAP_CHARGE_ENERGY: 35 J
MDC_IDC_MSMT_CAP_CHARGE_TIME: 4.52
MDC_IDC_MSMT_CAP_CHARGE_TIME: 9.93
MDC_IDC_MSMT_CAP_CHARGE_TYPE: NORMAL
MDC_IDC_MSMT_CAP_CHARGE_TYPE: NORMAL
MDC_IDC_MSMT_LEADCHNL_LV_IMPEDANCE_VALUE: 4047 OHM
MDC_IDC_MSMT_LEADCHNL_LV_IMPEDANCE_VALUE: 4047 OHM
MDC_IDC_MSMT_LEADCHNL_LV_IMPEDANCE_VALUE: 494 OHM
MDC_IDC_MSMT_LEADCHNL_LV_PACING_THRESHOLD_AMPLITUDE: 0.62 V
MDC_IDC_MSMT_LEADCHNL_LV_PACING_THRESHOLD_PULSEWIDTH: 0.4 MS
MDC_IDC_MSMT_LEADCHNL_RA_IMPEDANCE_VALUE: 494 OHM
MDC_IDC_MSMT_LEADCHNL_RA_PACING_THRESHOLD_AMPLITUDE: 1.5 V
MDC_IDC_MSMT_LEADCHNL_RA_PACING_THRESHOLD_PULSEWIDTH: 0.4 MS
MDC_IDC_MSMT_LEADCHNL_RA_SENSING_INTR_AMPL: 2.62 MV
MDC_IDC_MSMT_LEADCHNL_RA_SENSING_INTR_AMPL: 4.25 MV
MDC_IDC_MSMT_LEADCHNL_RV_IMPEDANCE_VALUE: 266 OHM
MDC_IDC_MSMT_LEADCHNL_RV_IMPEDANCE_VALUE: 380 OHM
MDC_IDC_MSMT_LEADCHNL_RV_PACING_THRESHOLD_AMPLITUDE: 1.25 V
MDC_IDC_MSMT_LEADCHNL_RV_PACING_THRESHOLD_PULSEWIDTH: 0.4 MS
MDC_IDC_MSMT_LEADCHNL_RV_SENSING_INTR_AMPL: 6.38 MV
MDC_IDC_MSMT_LEADCHNL_RV_SENSING_INTR_AMPL: 6.88 MV
MDC_IDC_PG_IMPLANT_DTM: NORMAL
MDC_IDC_PG_MFG: NORMAL
MDC_IDC_PG_MODEL: NORMAL
MDC_IDC_PG_SERIAL: NORMAL
MDC_IDC_PG_TYPE: NORMAL
MDC_IDC_SESS_CLINIC_NAME: NORMAL
MDC_IDC_SESS_DTM: NORMAL
MDC_IDC_SESS_TYPE: NORMAL
MDC_IDC_SET_BRADY_LOWRATE: 50 {BEATS}/MIN
MDC_IDC_SET_BRADY_MAX_SENSOR_RATE: 130 {BEATS}/MIN
MDC_IDC_SET_BRADY_MODE: NORMAL
MDC_IDC_SET_CRT_PACED_CHAMBERS: NORMAL
MDC_IDC_SET_LEADCHNL_LV_PACING_ANODE_ELECTRODE_1: NORMAL
MDC_IDC_SET_LEADCHNL_LV_PACING_ANODE_LOCATION_1: NORMAL
MDC_IDC_SET_LEADCHNL_LV_PACING_CATHODE_ELECTRODE_1: NORMAL
MDC_IDC_SET_LEADCHNL_LV_PACING_CATHODE_LOCATION_1: NORMAL
MDC_IDC_SET_LEADCHNL_LV_PACING_POLARITY: NORMAL
MDC_IDC_SET_LEADCHNL_RA_PACING_AMPLITUDE: 3 V
MDC_IDC_SET_LEADCHNL_RA_PACING_ANODE_ELECTRODE_1: NORMAL
MDC_IDC_SET_LEADCHNL_RA_PACING_ANODE_LOCATION_1: NORMAL
MDC_IDC_SET_LEADCHNL_RA_PACING_CATHODE_ELECTRODE_1: NORMAL
MDC_IDC_SET_LEADCHNL_RA_PACING_CATHODE_LOCATION_1: NORMAL
MDC_IDC_SET_LEADCHNL_RA_PACING_POLARITY: NORMAL
MDC_IDC_SET_LEADCHNL_RA_PACING_PULSEWIDTH: 0.4 MS
MDC_IDC_SET_LEADCHNL_RA_SENSING_ANODE_ELECTRODE_1: NORMAL
MDC_IDC_SET_LEADCHNL_RA_SENSING_ANODE_LOCATION_1: NORMAL
MDC_IDC_SET_LEADCHNL_RA_SENSING_CATHODE_ELECTRODE_1: NORMAL
MDC_IDC_SET_LEADCHNL_RA_SENSING_CATHODE_LOCATION_1: NORMAL
MDC_IDC_SET_LEADCHNL_RA_SENSING_POLARITY: NORMAL
MDC_IDC_SET_LEADCHNL_RA_SENSING_SENSITIVITY: 0.45 MV
MDC_IDC_SET_LEADCHNL_RV_PACING_AMPLITUDE: 2.5 V
MDC_IDC_SET_LEADCHNL_RV_PACING_ANODE_ELECTRODE_1: NORMAL
MDC_IDC_SET_LEADCHNL_RV_PACING_ANODE_LOCATION_1: NORMAL
MDC_IDC_SET_LEADCHNL_RV_PACING_CATHODE_ELECTRODE_1: NORMAL
MDC_IDC_SET_LEADCHNL_RV_PACING_CATHODE_LOCATION_1: NORMAL
MDC_IDC_SET_LEADCHNL_RV_PACING_POLARITY: NORMAL
MDC_IDC_SET_LEADCHNL_RV_PACING_PULSEWIDTH: 0.4 MS
MDC_IDC_SET_LEADCHNL_RV_SENSING_ANODE_ELECTRODE_1: NORMAL
MDC_IDC_SET_LEADCHNL_RV_SENSING_ANODE_LOCATION_1: NORMAL
MDC_IDC_SET_LEADCHNL_RV_SENSING_CATHODE_ELECTRODE_1: NORMAL
MDC_IDC_SET_LEADCHNL_RV_SENSING_CATHODE_LOCATION_1: NORMAL
MDC_IDC_SET_LEADCHNL_RV_SENSING_POLARITY: NORMAL
MDC_IDC_SET_LEADCHNL_RV_SENSING_SENSITIVITY: 0.3 MV
MDC_IDC_SET_ZONE_DETECTION_BEATS_DENOMINATOR: 40 {BEATS}
MDC_IDC_SET_ZONE_DETECTION_BEATS_NUMERATOR: 30 {BEATS}
MDC_IDC_SET_ZONE_DETECTION_INTERVAL: 240 MS
MDC_IDC_SET_ZONE_DETECTION_INTERVAL: 270 MS
MDC_IDC_SET_ZONE_DETECTION_INTERVAL: 360 MS
MDC_IDC_SET_ZONE_DETECTION_INTERVAL: 400 MS
MDC_IDC_SET_ZONE_DETECTION_INTERVAL: 430 MS
MDC_IDC_SET_ZONE_TYPE: NORMAL
MDC_IDC_STAT_AT_BURDEN_PERCENT: 0 %
MDC_IDC_STAT_AT_DTM_END: NORMAL
MDC_IDC_STAT_AT_DTM_START: NORMAL
MDC_IDC_STAT_BRADY_AP_VP_PERCENT: 0 %
MDC_IDC_STAT_BRADY_AP_VS_PERCENT: 0.07 %
MDC_IDC_STAT_BRADY_AS_VP_PERCENT: 0 %
MDC_IDC_STAT_BRADY_AS_VS_PERCENT: 99.93 %
MDC_IDC_STAT_BRADY_DTM_END: NORMAL
MDC_IDC_STAT_BRADY_DTM_START: NORMAL
MDC_IDC_STAT_BRADY_RA_PERCENT_PACED: 0.07 %
MDC_IDC_STAT_BRADY_RV_PERCENT_PACED: 0 %
MDC_IDC_STAT_CRT_DTM_END: NORMAL
MDC_IDC_STAT_CRT_DTM_START: NORMAL
MDC_IDC_STAT_CRT_LV_PERCENT_PACED: 0 %
MDC_IDC_STAT_CRT_PERCENT_PACED: 0 %
MDC_IDC_STAT_EPISODE_RECENT_COUNT: 0
MDC_IDC_STAT_EPISODE_RECENT_COUNT_DTM_END: NORMAL
MDC_IDC_STAT_EPISODE_RECENT_COUNT_DTM_START: NORMAL
MDC_IDC_STAT_EPISODE_TOTAL_COUNT: 0
MDC_IDC_STAT_EPISODE_TOTAL_COUNT: 0
MDC_IDC_STAT_EPISODE_TOTAL_COUNT: 189
MDC_IDC_STAT_EPISODE_TOTAL_COUNT: 19
MDC_IDC_STAT_EPISODE_TOTAL_COUNT: 19
MDC_IDC_STAT_EPISODE_TOTAL_COUNT: 2
MDC_IDC_STAT_EPISODE_TOTAL_COUNT: 292
MDC_IDC_STAT_EPISODE_TOTAL_COUNT_DTM_END: NORMAL
MDC_IDC_STAT_EPISODE_TOTAL_COUNT_DTM_START: NORMAL
MDC_IDC_STAT_EPISODE_TYPE: NORMAL
MDC_IDC_STAT_TACHYTHERAPY_ATP_DELIVERED_RECENT: 0
MDC_IDC_STAT_TACHYTHERAPY_ATP_DELIVERED_TOTAL: 14
MDC_IDC_STAT_TACHYTHERAPY_RECENT_DTM_END: NORMAL
MDC_IDC_STAT_TACHYTHERAPY_RECENT_DTM_START: NORMAL
MDC_IDC_STAT_TACHYTHERAPY_SHOCKS_ABORTED_RECENT: 0
MDC_IDC_STAT_TACHYTHERAPY_SHOCKS_ABORTED_TOTAL: 3
MDC_IDC_STAT_TACHYTHERAPY_SHOCKS_DELIVERED_RECENT: 0
MDC_IDC_STAT_TACHYTHERAPY_SHOCKS_DELIVERED_TOTAL: 4
MDC_IDC_STAT_TACHYTHERAPY_TOTAL_DTM_END: NORMAL
MDC_IDC_STAT_TACHYTHERAPY_TOTAL_DTM_START: NORMAL

## 2020-08-11 NOTE — LETTER
Patient Name: Jim Willingham   : 1955   Diagnosis/ICD-10: Heart Failure, unspecified I50.9; LVAD Z95.811   Requesting Physician: Dr. Delisa [facility]   Date of Request: 20   Date to Draw 2020     **Please fax results to Rajani Abdi RN VAD Coord at [phone].                Call [phone] with Questions      ORDER CODE TESTS YAYO.VOL.   X CBASIC Na, K, Cl, CO2, Crea, BUN, Glu, Ca GG 0.5-1    BHEPAT Alb, AlkP, ALT, AST, BBil, TP GG 0.5-1    BLIP Chol, Trig, HDL, LDL GG 1-2    CCOMP Na, K, Cl, CO2, Crea, BUN, Glu, Ca, Alb, AlkP, ALT, AST, Bbil, TP,  GG 0.6-1   X HGP CBC & Platelet P 0.3-1    HGDP CBC, Differential & Platelet P 0.3-1    PLT Platelet  P 0.3-1   X INR INR B 2.7    PTT PTT B 2.7   X LD Lactate Dehydrogenase GG 0.3-1    PHGB Plasma Hemoglobin GG 2-3    Pre-Albumin Pre-Albumin RG 1.0                  Signed,      Delisa Montgomery MD  Heart Failure, Mechanical Circulatory Support and Transplant Cardiology   of Medicine,  Division of Cardiology, [facility]

## 2020-08-11 NOTE — PROGRESS NOTES
ANTICOAGULATION FOLLOW-UP CLINIC VISIT    Patient Name:  Jim Willingham  Date:  2020  Contact Type:  Telephone    SUBJECTIVE:  Patient Findings     Comments:   Spoke to Jim.  He will have the home care nurse draw an INR at the next visit.  Updated calendar.  Patient will take 7.5mg daily.  Stated he has discontinued Bumex.          Clinical Outcomes     Comments:   Spoke to Jim.  He will have the home care nurse draw an INR at the next visit.  Updated calendar.  Patient will take 7.5mg daily.  Stated he has discontinued Bumex.             OBJECTIVE    Recent labs: (last 7 days)     08/10/20  1610   INR 1.80*       ASSESSMENT / PLAN  INR assessment SUB    Recheck INR In: 6 DAYS    INR Location Homecare INR      Anticoagulation Summary  As of 2020    INR goal:   2.0-3.0   TTR:   56.0 % (11.2 mo)   INR used for dosin.80! (8/10/2020)   Warfarin maintenance plan:   5 mg (5 mg x 1) every Thu, Sat; 7.5 mg (5 mg x 1.5) all other days   Full warfarin instructions:   : 7.5 mg; Otherwise 5 mg every Thu, Sat; 7.5 mg all other days   Weekly warfarin total:   47.5 mg   Plan last modified:   Delisa Hillman RN (2020)   Next INR check:   2020   Priority:   Critical   Target end date:   Indefinite    Indications    LVAD (left ventricular assist device) present (H) [Z95.811]  Long-term (current) use of anticoagulants [Z79.01] [Z79.01]  Chronic systolic congestive heart failure (H) [I50.22]             Anticoagulation Episode Summary     INR check location:       Preferred lab:       Send INR reminders to:   [facility]    Comments:   HIPPA Forms mailed 17  Labs drawn either at [facility] or [facility], See 3/5/20 ADDENDUM, pt. weekly INR >2.0 for Cardioversion  LVAD placed 17   II  ASA 81 mg daily      Anticoagulation Care Providers     Provider Role Specialty Phone number    Delisa Montgomery MD Referring Cardiology [phone]            See the Encounter  "Report to view Anticoagulation Flowsheet and Dosing Calendar (Go to Encounters tab in chart review, and find the Anticoagulation Therapy Visit)    INR/CFX/F2 RESULT:INR is 1.80 per home care nurse (ran at [place] at 6:35PM)    ASSESSMENT:Jim is \"feeling good, now\".  He states he was dehydrated, and feels that his wt is back on target.  Patient reports, \"transplant is the goal\".  Patient did not take Coumadin prior to draw late last evening.    DOSING ADJUSTMENT:7.5mg daily    NEXT INR/FACTOR X OR FACTOR II:8/17 at next home visit    PROTOCOL FOLLOWED:LVAD goal 2-3  Patient had LVAD placed on:   6/19/17  Type of LVAD: Heartmate 2a  Patient's current Aspirin dose: 81mg  LVAD Protocol followed:  No.   If Not Followed Explanation:  Timeframe not followed for subtherapeutic INR          Brendan Montoya RN                 "

## 2020-08-11 NOTE — PROGRESS NOTES
Called pt to check in after hospital discharge. Pt reports he is feeling really good. Weight is stable at 231lb. He has not taken any bumex. Home care ricardo labs yesterday. Reviewed results with Soraya Marshall NP. Noted Na remains borderline low at 132. Received instructions to start bumex at 0.5mg po BID and recheck labs in 1 week. Also noted that LDH is elevated, and INR is subtherapeutic. Received instructions to increase ASA to 325mg po daily until INR therapeutic.  Called pt back to review instructions. Pt verbalized understanding. Orders for labs faxed to  Home Care. RN goes to pt's home on Mondays - will get labs drawn on 8/17.

## 2020-08-11 NOTE — TELEPHONE ENCOUNTER
[facility] now requests orders and shares plan of care/discharge summaries for some patients through [facility].  Please REPLY TO THIS MESSAGE OR ROUTE BACK TO THE AUTHOR in order to give authorization for orders when needed.  This is considered a verbal order, you will still receive a faxed copy of orders for signature.  Thank you for your assistance in improving collaboration for our patients.    MED ISSUES:  Med interactions found.  PMD would like cardiology to address:  -Allopurinol and amiodarone interacts with warfarin.   -Amiodarone interacts with Celexa.   Please advise.    Thank you,  Maryellen Cain RN   [facility]   [phone]  [email or web address]

## 2020-08-12 NOTE — DISCHARGE SUMMARY
Medicine Discharge Summary  Jim Willingham MRN: [id number]  1955  Primary care provider: Maya Lamb  ___________________________________          Date of Admission:  5/23/2017  Date of Discharge:  5/25/2017   Admitting Physician:  Rose Conte MD  Discharge Physician:  Rose Conte MD   Discharging Service:  Cardiology 2   Primary Provider: Maya Lamb         Reason for Admission:   LVAD work up             Discharge Diagnosis:   LVAD work up  Combined systolic and diastolic heart failure  NICM (20%)  VT  P Afib  DM  CECILIA           Procedures & Significant Findings:   CT chest-   IMPRESSION:   1. Focal regions of groundglass opacity in the lower lobes associated  with mild interlobular septal thickening, may be  infectious/inflammatory versus pulmonary edema, recommend follow-up to  resolution.  2. Lower lobe bronchiectasis and bronchial wall thickening can be seen  with COPD. Can also be seen in ABPA  3. Cardiomegaly with automatic implantable cardiac defibrillator in  good position.  4. Enlarged mediastinal lymph nodes, may be reactive.       Right heart Cath:     HEMODYNAMICS:  1. HR 70 bpm  2. /66/77 mmHg  3. RA 10/9/8   4. RV 46/11  5. PA 46/24/33   6. PCW 25/21/18   7. PA sat 59.3%  9. Hgb 10.7 g/dL   10. Kee CO 5.2   11. Kee CI 2.3   12. TD CO 5.6   13. TD CI 2.4  14. PVR 2.9  15. SVR 1061            Consultations:   CVTS  Neuropsychiatry  Palliative         Hospital Course by Problem:      Jim Willingham is a 61 year old male with PMH of NICM (EF 20%) s/p Bi-V ICD, severe MR, VT, paroxsymal afib, DM, HTN, CKD, CECILIA, Depression admitted for initiation of LVAD workup.      NICM (EF20%)  Combined Systolic Diastolic Heart Failure  Longstanding heart failure, recent referral to [facility] for consideration of advanced therapies. Clinic visit with Dr. Montgomery 5/12 with plan for admission for medical optimization  "cc:  Follow up tachycardia    Subjective:     Олег Ramirez is a 34 y.o. male presenting for follow up tachycardia      Patient presents to the office for follow up tachycardia.  He is still having fluttering in his chest but it happens less.  It seems to occur more at nighttime when he is due for his second dose. He sees cardiology on the 19th     Review of systems:  See above.   Denies any symptoms unless previously indicated.        Current Outpatient Medications:   •  propranolol (INDERAL) 20 MG Tab, Take 1 Tab by mouth 2 times a day., Disp: 60 Tab, Rfl: 2  •  esomeprazole (NEXIUM) 40 MG delayed-release capsule, Take 1 Cap by mouth every morning before breakfast., Disp: 30 Cap, Rfl: 3    Allergies, past medical history, past surgical history, family history, social history reviewed and updated    Objective:     Vitals: /82 (BP Location: Left arm, Patient Position: Sitting, BP Cuff Size: Adult)   Pulse (!) 102   Temp 36.8 °C (98.3 °F) (Temporal)   Resp 16   Ht 1.727 m (5' 8\")   Wt 81.6 kg (180 lb)   SpO2 94%   BMI 27.37 kg/m²   General: Alert, pleasant, NAD  EYES:   PERRL, EOMI, no icterus or pallor.  Conjunctivae and lids normal.   HENT:  Normocephalic.  External ears normal. Tympanic membranes pearly, opaque.  Facial mask in place.  No thyromegaly or masses palpated. No cervical or supraclavicular lymphadenopathy.  Abdomen: Not obese  Musculoskeletal: Gait is normal.  Moves all extremities well.    Extremities: Normal range of motion all extremities.   Neurological: No tremors, sensation grossly intact,  CN2-12 intact.  Psych:  Affect/mood is normal, judgement is good, memory is intact, grooming is appropriate.    Assessment/Plan:     Олег was seen today for medication management.    Diagnoses and all orders for this visit:    Prolonged QT syndrome  -     propranolol (INDERAL) 20 MG Tab; Take 1 Tab by mouth 2 times a day.    Syncope and collapse  -     propranolol (INDERAL) 20 MG Tab; Take " 1 Tab by mouth 2 times a day.    Tachycardia  -     propranolol (INDERAL) 20 MG Tab; Take 1 Tab by mouth 2 times a day.    Palpitations  -     propranolol (INDERAL) 20 MG Tab; Take 1 Tab by mouth 2 times a day.      Patient's cardiology appointment is next week.  Blood pressure is normal.  Pulse rate is still 102.  We will increase the medication dose to 20 mg of propranolol twice a day and see if this will help improve symptoms slightly.  If he begins to feel lightheaded or dizzy with the medication, he is to decrease back down to 10 mg.  As his appointment is with cardiology in 1 week, will follow any further recommendations they may have.      Return in about 4 weeks (around 9/9/2020), or if symptoms worsen or fail to improve, for 4-6 weeks.    Please note that this dictation was created using voice recognition software. I have made every reasonable attempt to correct obvious errors, but expect that there are errors of grammar and possible content that I did not discover before finalizing note.    and LVAD workup, with plan for consideration of heart transplant in future. RHC prior to admission, appears to be doing well on current medical regimen.  - Continue Metoprolol- mg BID,spironolactone 25 mg daily, losartan 25 mg daily, hydralazine 10 mg TID  -Bumex increased to 2 mg PO BID during this hospitalization  - LVAD orderset placed, has completed CXR and Head CT without acute pathology      -pt mentioned he has all the meds mentioned above besides bumex which was sent to his pharmacy         Work up:   -consult- CVTS, palliative, neuropsychology completed during this hospitalization  -labs: digoxin level, PTT, Cardiolipin ab, PRA single antigen IgG ab, HLA typing, lupus panel, LDH, vitamin b12, phosphorus, transferrin, ferritin, iron and iron binding capacity, cystatin C, TSH, uric acid, ESR, hbA1c, ABO/ RH type and screen which was done in this hospitalization  -imaging- Head CT- normal; US abdomen with cholelithiasis, xray- mild perihilar opacities  -pending - Chest CT, US carotid bilateral, US lower extremity with DEONTE completed in this hospitalization        Asthma/COPD  Recently completed extended steroid taper (~ 1 year). S/p doxycycline course for possible exacerbation. Does endorses productive cough. No opacities on CXR.  - Continue Breo ellipta, umeclidinium, singulair, PRN albuterol  - Sputum culture negative   -concern for flare of COPD exacerbation with new onset cough - will watch for symptoms      Left extremity numbness:  -pt concern for left extremity numbness with h/o thoracic outlet syndrome  And also left elbow pain which is consistent with bursitis  -continue with diclofenac gel and tylenol           Chronic Conditions  VT: Continue amiodarone 100 mg daily and digoxin 125 mcg daily  Paroxsymal Afib: On amiodarone, digoxin as above, warfarin home dose  CKD: Baseline creatinine per chart review ~ 1.5, currently 1.41  DM: continue with metformin  CECILIA: CPAP on home  "settings  Depression: Continue home citalopram 20 mg daily     Physical Exam on day of Discharge:  Blood pressure 98/66, pulse 69, temperature 98.5  F (36.9  C), temperature source Oral, resp. rate 20, height 1.727 m (5' 8\"), weight 118.7 kg (261 lb 11.2 oz), SpO2 95 %.  General: pt looks comfortable on exam  HEENT: NC, AT; PERRLA  Neck:mobile , non tender, adequate range of motion, JVD not appreciated in sitting position  Respiratory: b/l equal breath sounds with no added sounds   Heart/CV:s1s2 with no murmur  Abdomen/GI: soft, non tender, bowel sounds noted   Neuro: AAOx3, b/l UE and LE-5/5    Lines/Tubes:  none         Pending Results:   Will get CPx, walk test and PFT as an outpatient          Discharge Medications:     Current Discharge Medication List      CONTINUE these medications which have CHANGED    Details   bumetanide (BUMEX) 2 MG tablet Take 1 tablet (2 mg) by mouth 2 times daily  Qty: 60 tablet, Refills: 0    Associated Diagnoses: NICM (nonischemic cardiomyopathy) (H)         CONTINUE these medications which have NOT CHANGED    Details   metoprolol (TOPROL XL) 100 MG 24 hr tablet Take 1 tablet (100 mg) by mouth 2 times daily  Qty: 60 tablet, Refills: 5    Associated Diagnoses: Chronic systolic heart failure (H)      losartan (COZAAR) 50 MG tablet Take 0.5 tablets (25 mg) by mouth 2 times daily  Qty: 30 tablet, Refills: 1    Associated Diagnoses: Chronic systolic heart failure (H)      hydrALAZINE (APRESOLINE) 10 MG tablet Take 1 tablet (10 mg) by mouth 3 times daily  Qty: 120 tablet, Refills: 1    Associated Diagnoses: Chronic systolic heart failure (H)      benzonatate (TESSALON PERLES) 100 MG capsule Take 200 mg by mouth 3 times daily as needed for cough      albuterol (ALBUTEROL) 108 (90 BASE) MCG/ACT Inhaler Inhale 2 puffs into the lungs every 4 hours as needed for shortness of breath / dyspnea or wheezing      ipratropium - albuterol 0.5 mg/2.5 mg/3 mL (DUONEB) 0.5-2.5 (3) MG/3ML neb solution " Take 3 mLs by nebulization every 4 hours as needed for shortness of breath / dyspnea or wheezing      amiodarone (PACERONE/CODARONE) 200 MG tablet Take 100 mg by mouth daily      citalopram (CELEXA) 20 MG tablet Take 20 mg by mouth daily      cyanocobalamin (VITAMIN B12) 1000 MCG/ML injection Inject 1,000 mcg into the muscle every 30 days      digoxin (DIGOX) 125 MCG tablet Take 125 mcg by mouth daily      diphenhydrAMINE (BENADRYL) 25 MG capsule Take 25 mg by mouth nightly as needed for itching or allergies      fluticasone-vilanterol (BREO ELLIPTA) 200-25 MCG/INH oral inhaler Inhale 1 puff into the lungs daily      metFORMIN (GLUCOPHAGE) 1000 MG tablet Take 1,000 mg by mouth daily (with dinner)      montelukast (SINGULAIR) 10 MG tablet Take 10 mg by mouth At Bedtime      spironolactone (ALDACTONE) 25 MG tablet Take 25 mg by mouth 2 times daily      traMADol (ULTRAM) 50 MG tablet Take 100 mg by mouth every 6 hours as needed for moderate pain      umeclidinium (INCRUSE ELLIPTA) 62.5 MCG/INH oral inhaler Inhale 1 puff into the lungs daily      warfarin (COUMADIN) 5 MG tablet Take 5 mg by mouth See Admin Instructions 10 mg M,W,F, 7.5 mg all other days      zinc sulfate (ZINCATE) 220 (50 ZN) MG capsule Take 220 mg by mouth 2 times daily                  Discharge Instructions and Follow-Up:     Discharge Procedure Orders  Medication Therapy Management Referral   Referral Type: Med Therapy Management     Reason for your hospital stay   Order Comments: You were admitted for LVAD work up     Adult [facility] Follow-up and recommended labs and tests   Order Comments: Follow up with Dr Montgomery in 1 week  BMP, INR in 1 week    Appointments on [place] and/or [facility] (with [facility] or [facility] provider or service). Call [phone] if you haven't heard regarding these appointments within 7 days of discharge.     Follow Up and recommended labs and tests   Order Comments: Follow up INR, BMP in 1 week     Activity   Order  Comments: Your activity upon discharge: advance as tolerated   Order Specific Question Answer Comments   Is discharge order? Yes      Discharge Instructions   Order Comments: Follow up BMP, INR in 1 week     Full Code     Diet   Order Comments: Follow this diet upon discharge: cardiac diet   Order Specific Question Answer Comments   Is discharge order? Yes             IV access peripheral         Discharge Disposition:   Home     Follow up with Dr Montgomery in 1 week; and rest of the work up as an outpatient         Condition on Discharge:   Discharge condition: Home    Code status on discharge: Full code        Date of service: 5/26/2017    The patient was discussed with Dr. Dg Cespedes, RAUL  PGY-3  P; [phone]      I,Rose Conte MD, have seen and examined this patient. I have discussed with the team and agree with the findings and discharge plan. I have reviewed the hospital course with the patient and have spent 25 minutes in the process of discharge, including discharge planning with patient education, medication teaching, and the coordination of care to outpatient providers.  It has been a pleasure to participate in the care of your patient - please do not hesitate to contact me at the number below with any questions.    Rose Conte MD  Section Head - Advanced Heart Failure, Transplantation and Mechanical Circulatory Support  Co-Director - Adult [facility]  Associate Professor of Medicine, [facility]

## 2020-08-13 DIAGNOSIS — Z95.811 LVAD (LEFT VENTRICULAR ASSIST DEVICE) PRESENT (H): ICD-10-CM

## 2020-08-13 DIAGNOSIS — Z11.59 ENCOUNTER FOR SCREENING FOR OTHER VIRAL DISEASES: Primary | ICD-10-CM

## 2020-08-13 DIAGNOSIS — I50.22 CHRONIC SYSTOLIC CONGESTIVE HEART FAILURE (H): ICD-10-CM

## 2020-08-13 RX ORDER — BUMETANIDE 0.5 MG/1
0.5 TABLET ORAL 2 TIMES DAILY
Qty: 180 TABLET | Refills: 3 | Status: SHIPPED | OUTPATIENT
Start: 2020-08-13 | End: 2020-08-25

## 2020-08-13 NOTE — TELEPHONE ENCOUNTER
Transplant Update 08/13/20:  Topic:  Transplant Evaluation    Spoke to patient in regards to transplant evaluation.  He has had his VAD for about 3 years and is feeling well overall.  He is in agreement to proceed with his evaluation, but would like to schedule in September.  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    He also reports that his wife (estranged, but lives with him) had hip surgery and is unable to be of assistance at this time.  His sister will be his primary caregiver/support person at this time.    Explained that many tests would need to be repeated per protocol for transplant.  Made a plan to connect the last week in August and get him scheduled.    Patient verbalized understanding of the plan and agrees to call Transplant Coordinator with any further questions or concerns.

## 2020-08-13 NOTE — PROGRESS NOTES
Gave pt instructions to start bumex 0.5mg po BID yesterday. Pt called to report he only has 2mg tablets. Sent a new prescription to pt's pharmacy for 0.5mg tablets.

## 2020-08-17 ENCOUNTER — ANTICOAGULATION THERAPY VISIT (OUTPATIENT)
Dept: ANTICOAGULATION | Facility: CLINIC | Age: 65
End: 2020-08-17

## 2020-08-17 ENCOUNTER — CARE COORDINATION (OUTPATIENT)
Dept: CARDIOLOGY | Facility: CLINIC | Age: 65
End: 2020-08-17

## 2020-08-17 ENCOUNTER — APPOINTMENT (OUTPATIENT)
Dept: LAB | Facility: CLINIC | Age: 65
End: 2020-08-17

## 2020-08-17 DIAGNOSIS — Z79.01 LONG TERM CURRENT USE OF ANTICOAGULANT THERAPY: ICD-10-CM

## 2020-08-17 DIAGNOSIS — I50.22 CHRONIC SYSTOLIC CONGESTIVE HEART FAILURE (H): ICD-10-CM

## 2020-08-17 DIAGNOSIS — Z95.811 LVAD (LEFT VENTRICULAR ASSIST DEVICE) PRESENT (H): ICD-10-CM

## 2020-08-17 LAB
ANION GAP SERPL CALCULATED.3IONS-SCNC: 8 MMOL/L (ref 3–14)
BASOPHILS # BLD AUTO: 0 10E9/L (ref 0–0.2)
BASOPHILS NFR BLD AUTO: 0.4 %
BUN SERPL-MCNC: 26 MG/DL (ref 7–30)
CALCIUM SERPL-MCNC: 8.2 MG/DL (ref 8.5–10.1)
CHLORIDE SERPL-SCNC: 107 MMOL/L (ref 94–109)
CO2 SERPL-SCNC: 24 MMOL/L (ref 20–32)
CREAT SERPL-MCNC: 1.32 MG/DL (ref 0.66–1.25)
DIFFERENTIAL METHOD BLD: ABNORMAL
EOSINOPHIL # BLD AUTO: 0 10E9/L (ref 0–0.7)
EOSINOPHIL NFR BLD AUTO: 0.3 %
ERYTHROCYTE [DISTWIDTH] IN BLOOD BY AUTOMATED COUNT: 16.8 % (ref 10–15)
GFR SERPL CREATININE-BSD FRML MDRD: 57 ML/MIN/{1.73_M2}
GLUCOSE SERPL-MCNC: 74 MG/DL (ref 70–99)
HCT VFR BLD AUTO: 34.2 % (ref 40–53)
HGB BLD-MCNC: 10.4 G/DL (ref 13.3–17.7)
IMM GRANULOCYTES # BLD: 0.1 10E9/L (ref 0–0.4)
IMM GRANULOCYTES NFR BLD: 0.8 %
INR PPP: 3.44 (ref 0.86–1.14)
LDH SERPL L TO P-CCNC: 412 U/L (ref 85–227)
LDH SERPL L TO P-CCNC: 570 U/L (ref 85–227)
LYMPHOCYTES # BLD AUTO: 1.5 10E9/L (ref 0.8–5.3)
LYMPHOCYTES NFR BLD AUTO: 19.9 %
MCH RBC QN AUTO: 28.3 PG (ref 26.5–33)
MCHC RBC AUTO-ENTMCNC: 30.4 G/DL (ref 31.5–36.5)
MCV RBC AUTO: 93 FL (ref 78–100)
MONOCYTES # BLD AUTO: 0.6 10E9/L (ref 0–1.3)
MONOCYTES NFR BLD AUTO: 8.5 %
NEUTROPHILS # BLD AUTO: 5.3 10E9/L (ref 1.6–8.3)
NEUTROPHILS NFR BLD AUTO: 70.1 %
NRBC # BLD AUTO: 0 10*3/UL
NRBC BLD AUTO-RTO: 0 /100
PLATELET # BLD AUTO: 209 10E9/L (ref 150–450)
POTASSIUM SERPL-SCNC: 4 MMOL/L (ref 3.4–5.3)
RBC # BLD AUTO: 3.67 10E12/L (ref 4.4–5.9)
SODIUM SERPL-SCNC: 138 MMOL/L (ref 133–144)
WBC # BLD AUTO: 7.6 10E9/L (ref 4–11)

## 2020-08-17 PROCEDURE — 83615 LACTATE (LD) (LDH) ENZYME: CPT | Performed by: INTERNAL MEDICINE

## 2020-08-17 PROCEDURE — 80048 BASIC METABOLIC PNL TOTAL CA: CPT | Performed by: INTERNAL MEDICINE

## 2020-08-17 PROCEDURE — 85025 COMPLETE CBC W/AUTO DIFF WBC: CPT | Performed by: INTERNAL MEDICINE

## 2020-08-17 PROCEDURE — 85610 PROTHROMBIN TIME: CPT | Performed by: INTERNAL MEDICINE

## 2020-08-17 NOTE — PROGRESS NOTES
ANTICOAGULATION FOLLOW-UP CLINIC VISIT    Patient Name:  Jim Willingham  Date:  8/17/2020  Contact Type:  Telephone    SUBJECTIVE:  Patient Findings     Positives:   Change in medications (Bumex dose decreased.  ASA at 81mg on 8/17.)             OBJECTIVE    Recent labs: (last 7 days)     08/17/20  1025   INR 3.44*       ASSESSMENT / PLAN  INR assessment SUPRA    Recheck INR In: 1 WEEK    INR Location Homecare INR      Anticoagulation Summary  As of 8/17/2020    INR goal:   2.0-3.0   TTR:   55.3 % (11.3 mo)   INR used for dosing:   3.44! (8/17/2020)   Warfarin maintenance plan:   5 mg (5 mg x 1) every Mon, Wed, Fri; 7.5 mg (5 mg x 1.5) all other days   Full warfarin instructions:   5 mg every Mon, Wed, Fri; 7.5 mg all other days   Weekly warfarin total:   45 mg   Plan last modified:   Brendan Montoya RN (8/17/2020)   Next INR check:   8/24/2020   Priority:   Critical   Target end date:   Indefinite    Indications    LVAD (left ventricular assist device) present (H) [Z95.811]  Long-term (current) use of anticoagulants [Z79.01] [Z79.01]  Chronic systolic congestive heart failure (H) [I50.22]             Anticoagulation Episode Summary     INR check location:       Preferred lab:       Send INR reminders to:   [facility]    Comments:   +++Patient drawn at Home care visit Q Mon. (8/10/20)+++  Labs drawn either at [facility] or [facility], See 3/5/20 ADDENDUM, pt. weekly INR >2.0 for Cardioversion  LVAD placed 6/19/17   II  ASA 81 mg daily      Anticoagulation Care Providers     Provider Role Specialty Phone number    Delisa Montgomery MD Referring Cardiology [phone]            See the Encounter Report to view Anticoagulation Flowsheet and Dosing Calendar (Go to Encounters tab in chart review, and find the Anticoagulation Therapy Visit)    Spoke with patient. Gave them their lab results and new warfarin recommendation.  No changes in health, or diet. No missed doses, no falls. No signs or  symptoms of bleed or clotting.    Reviewed ASA dose with patient. He is now taking 81mg daily.  States some bruising.    Discussed with Pharmacist Jae Bedolla [facility] for patient's new Anticoagulation recommendation.  Patient had LVAD placed on:   6/19/17  Type of LVAD: HM 2  Patient's current Aspirin dose: 81mg  LVAD Protocol followed:  Yes.   If Not Followed Explanation:  Brendan Garcia RN

## 2020-08-17 NOTE — PROGRESS NOTES
Pt had labs drawn today. Noted improved creat and Na, but elevated LDH at 570. Pt had another LDH result today of 412. Called the lab to inquire about the different results. Lab confirmed that the tube with the LDH result of 570 had some signs of hemolysis, and the tube with the result of 412 had no signs of hemolysis. Pt has been on ASA 325mg for almost a week after LDH was noted to be slightly elevated last week at 490.   Called pt to check in on symptoms. Pt reports weight is stable at 232lb. VAD parameters are stable, with flow 6.5l/min and power 6.4w, no alarms. Pt denies shortness of breath or fatigue.  Reviewed results with Soraya Marshall NP, including the differing results of LDH.  Plan: continue regimen of bumex 0.5mg BID. Pt can decrease ASA to 81mg daily. Pt will be seen in clinic as scheduled on 9/2.   Called pt back to relay plan. Pt verbalized understanding. Note routed to Anticoagulation clinic to update on ASA dosing.

## 2020-08-19 ENCOUNTER — CARE COORDINATION (OUTPATIENT)
Dept: CARDIOLOGY | Facility: CLINIC | Age: 65
End: 2020-08-19

## 2020-08-19 DIAGNOSIS — I50.22 CHRONIC SYSTOLIC CONGESTIVE HEART FAILURE (H): Primary | ICD-10-CM

## 2020-08-19 NOTE — PROGRESS NOTES
D:  Pt had LDH on 8/17 that resulted at 570.  Sample was hemolyzed.  Retest showed LDH of 412.  I:  Discussed w/ Dr Montgomery.  Instructed pt to get repeat LDH tomorrow.  A:  Pt verbalized understanding.  P:  Follow up LDH.

## 2020-08-20 DIAGNOSIS — I50.22 CHRONIC SYSTOLIC CONGESTIVE HEART FAILURE (H): ICD-10-CM

## 2020-08-20 LAB
ANION GAP SERPL CALCULATED.3IONS-SCNC: 6 MMOL/L (ref 3–14)
BUN SERPL-MCNC: 26 MG/DL (ref 7–30)
CALCIUM SERPL-MCNC: 8.2 MG/DL (ref 8.5–10.1)
CHLORIDE SERPL-SCNC: 106 MMOL/L (ref 94–109)
CO2 SERPL-SCNC: 27 MMOL/L (ref 20–32)
CREAT SERPL-MCNC: 1.39 MG/DL (ref 0.66–1.25)
GFR SERPL CREATININE-BSD FRML MDRD: 53 ML/MIN/{1.73_M2}
GLUCOSE SERPL-MCNC: 120 MG/DL (ref 70–99)
LDH SERPL L TO P-CCNC: 389 U/L (ref 85–227)
POTASSIUM SERPL-SCNC: 3.5 MMOL/L (ref 3.4–5.3)
SODIUM SERPL-SCNC: 139 MMOL/L (ref 133–144)

## 2020-08-20 PROCEDURE — 83615 LACTATE (LD) (LDH) ENZYME: CPT | Performed by: INTERNAL MEDICINE

## 2020-08-20 PROCEDURE — 36415 COLL VENOUS BLD VENIPUNCTURE: CPT | Performed by: INTERNAL MEDICINE

## 2020-08-20 PROCEDURE — 80048 BASIC METABOLIC PNL TOTAL CA: CPT | Performed by: INTERNAL MEDICINE

## 2020-08-21 ENCOUNTER — CARE COORDINATION (OUTPATIENT)
Dept: CARDIOLOGY | Facility: CLINIC | Age: 65
End: 2020-08-21

## 2020-08-21 NOTE — PROGRESS NOTES
Pt had LDH drawn yesterday, resulted at 389. Called pt today to review result. Pt reports flow of 6-6.5 and power 6. Baseline of around 5-5.5. instructed pt to continue to monitor parameters. If power and flow increase to more than 7-7.5 and remain elevated, he should page the VAD coordinator on call.   Pt has had a change in his caregiver situation. His wife recently underwent an ortho surgery and is unable to do dressing change for the next several weeks. In anticipation of this surgery, pt's sister in law came to clinic to learn dressing change, however she is no longer able to perform dressing change either. Pt had homecare for a few weeks after leaving the hospital, however he no longer qualifies for homecare and cannot get dressing change done by them.   Discussed situation with pt. As we have exhausted other options, we will teach pt to do dressing change on himself. Pt will come to clinic next week, 8/25, to receive dressing change education.

## 2020-08-24 ENCOUNTER — ANTICOAGULATION THERAPY VISIT (OUTPATIENT)
Dept: ANTICOAGULATION | Facility: CLINIC | Age: 65
End: 2020-08-24

## 2020-08-24 DIAGNOSIS — I50.22 CHRONIC SYSTOLIC CONGESTIVE HEART FAILURE (H): ICD-10-CM

## 2020-08-24 DIAGNOSIS — Z95.811 LVAD (LEFT VENTRICULAR ASSIST DEVICE) PRESENT (H): ICD-10-CM

## 2020-08-24 DIAGNOSIS — Z79.01 LONG TERM CURRENT USE OF ANTICOAGULANT THERAPY: ICD-10-CM

## 2020-08-24 LAB
ANION GAP SERPL CALCULATED.3IONS-SCNC: 7 MMOL/L (ref 3–14)
BASOPHILS # BLD AUTO: 0 10E9/L (ref 0–0.2)
BASOPHILS NFR BLD AUTO: 0.7 %
BUN SERPL-MCNC: 25 MG/DL (ref 7–30)
CALCIUM SERPL-MCNC: 8.5 MG/DL (ref 8.5–10.1)
CHLORIDE SERPL-SCNC: 107 MMOL/L (ref 94–109)
CO2 SERPL-SCNC: 25 MMOL/L (ref 20–32)
CREAT SERPL-MCNC: 1.25 MG/DL (ref 0.66–1.25)
DIFFERENTIAL METHOD BLD: ABNORMAL
EOSINOPHIL # BLD AUTO: 0.2 10E9/L (ref 0–0.7)
EOSINOPHIL NFR BLD AUTO: 2.8 %
ERYTHROCYTE [DISTWIDTH] IN BLOOD BY AUTOMATED COUNT: 16.6 % (ref 10–15)
GFR SERPL CREATININE-BSD FRML MDRD: 61 ML/MIN/{1.73_M2}
GLUCOSE SERPL-MCNC: 76 MG/DL (ref 70–99)
HCT VFR BLD AUTO: 33.7 % (ref 40–53)
HGB BLD-MCNC: 10.3 G/DL (ref 13.3–17.7)
IMM GRANULOCYTES # BLD: 0 10E9/L (ref 0–0.4)
IMM GRANULOCYTES NFR BLD: 0.5 %
INR PPP: 5.11 (ref 0.86–1.14)
LDH SERPL L TO P-CCNC: 372 U/L (ref 85–227)
LYMPHOCYTES # BLD AUTO: 1.4 10E9/L (ref 0.8–5.3)
LYMPHOCYTES NFR BLD AUTO: 23.9 %
MCH RBC QN AUTO: 28.9 PG (ref 26.5–33)
MCHC RBC AUTO-ENTMCNC: 30.6 G/DL (ref 31.5–36.5)
MCV RBC AUTO: 95 FL (ref 78–100)
MONOCYTES # BLD AUTO: 0.9 10E9/L (ref 0–1.3)
MONOCYTES NFR BLD AUTO: 15.2 %
NEUTROPHILS # BLD AUTO: 3.3 10E9/L (ref 1.6–8.3)
NEUTROPHILS NFR BLD AUTO: 56.9 %
NRBC # BLD AUTO: 0 10*3/UL
NRBC BLD AUTO-RTO: 0 /100
PLATELET # BLD AUTO: 225 10E9/L (ref 150–450)
POTASSIUM SERPL-SCNC: 3.8 MMOL/L (ref 3.4–5.3)
RBC # BLD AUTO: 3.56 10E12/L (ref 4.4–5.9)
SODIUM SERPL-SCNC: 139 MMOL/L (ref 133–144)
WBC # BLD AUTO: 5.8 10E9/L (ref 4–11)

## 2020-08-24 PROCEDURE — 85610 PROTHROMBIN TIME: CPT | Performed by: INTERNAL MEDICINE

## 2020-08-24 PROCEDURE — 80048 BASIC METABOLIC PNL TOTAL CA: CPT | Performed by: INTERNAL MEDICINE

## 2020-08-24 PROCEDURE — 83615 LACTATE (LD) (LDH) ENZYME: CPT | Performed by: STUDENT IN AN ORGANIZED HEALTH CARE EDUCATION/TRAINING PROGRAM

## 2020-08-24 PROCEDURE — 85025 COMPLETE CBC W/AUTO DIFF WBC: CPT | Performed by: INTERNAL MEDICINE

## 2020-08-24 NOTE — PROGRESS NOTES
Addendum 20 Patient calls back and POC is confirmed with patient.  Patient is not able to go back in for an INR recheck today.  Patient will eat a serving of vitamin K today.  No identifiable reason INR was high today.  Patient has already taken ASA today. [facility]              ANTICOAGULATION FOLLOW-UP CLINIC VISIT    Patient Name:  Jim Willingham  Date:  2020  Contact Type:  Telephone    SUBJECTIVE:  Patient Findings     Comments:   Instructions were given to hold warfarin and ASA. Requested that patient call back to verify he received message.         Clinical Outcomes     Comments:   Instructions were given to hold warfarin and ASA. Requested that patient call back to verify he received message.            OBJECTIVE    Recent labs: (last 7 days)     20  0900   INR 5.11*       ASSESSMENT / PLAN  No question data found.  Anticoagulation Summary  As of 2020    INR goal:   2.0-3.0   TTR:   54.3 % (11.3 mo)   INR used for dosin.11! (2020)   Warfarin maintenance plan:   5 mg (5 mg x 1) every Mon, Wed, Fri; 7.5 mg (5 mg x 1.5) all other days   Full warfarin instructions:   : Hold; Otherwise 5 mg every Mon, Wed, Fri; 7.5 mg all other days   Weekly warfarin total:   45 mg   Plan last modified:   Brendan Montoya RN (2020)   Next INR check:   2020   Priority:   Critical   Target end date:   Indefinite    Indications    LVAD (left ventricular assist device) present (H) [Z95.811]  Long-term (current) use of anticoagulants [Z79.01] [Z79.01]  Chronic systolic congestive heart failure (H) [I50.22]             Anticoagulation Episode Summary     INR check location:       Preferred lab:       Send INR reminders to:   [facility]    Comments:   +++Patient drawn at Home care visit Q Mon. (8/10/20)+++  Labs drawn either at [facility] or [facility], See 3/5/20 ADDENDUM, pt. weekly INR >2.0 for Cardioversion  LVAD placed 17   II  ASA 81 mg daily      Anticoagulation  Care Providers     Provider Role Specialty Phone number    Delisa Montgomery MD Referring Cardiology [phone]            See the Encounter Report to view Anticoagulation Flowsheet and Dosing Calendar (Go to Encounters tab in chart review, and find the Anticoagulation Therapy Visit)    Left message for patient with results and dosing recommendations. Asked patient to call back to report any missed doses, falls, signs and symptoms of bleeding or clotting, any changes in health, medication, or diet. Asked patient to call back with any questions or concerns.      Maru Alonso RN

## 2020-08-25 ENCOUNTER — ALLIED HEALTH/NURSE VISIT (OUTPATIENT)
Dept: CARDIOLOGY | Facility: CLINIC | Age: 65
End: 2020-08-25
Attending: INTERNAL MEDICINE
Payer: COMMERCIAL

## 2020-08-25 ENCOUNTER — ANTICOAGULATION THERAPY VISIT (OUTPATIENT)
Dept: ANTICOAGULATION | Facility: CLINIC | Age: 65
End: 2020-08-25

## 2020-08-25 ENCOUNTER — CARE COORDINATION (OUTPATIENT)
Dept: CARDIOLOGY | Facility: CLINIC | Age: 65
End: 2020-08-25

## 2020-08-25 DIAGNOSIS — Z95.811 LVAD (LEFT VENTRICULAR ASSIST DEVICE) PRESENT (H): ICD-10-CM

## 2020-08-25 DIAGNOSIS — Z79.01 LONG TERM CURRENT USE OF ANTICOAGULANT THERAPY: ICD-10-CM

## 2020-08-25 DIAGNOSIS — I50.22 CHRONIC SYSTOLIC CONGESTIVE HEART FAILURE (H): ICD-10-CM

## 2020-08-25 DIAGNOSIS — Z95.811 LVAD (LEFT VENTRICULAR ASSIST DEVICE) PRESENT (H): Primary | ICD-10-CM

## 2020-08-25 LAB — INR PPP: 4.37 (ref 0.86–1.14)

## 2020-08-25 RX ORDER — POTASSIUM CHLORIDE 750 MG/1
10 TABLET, EXTENDED RELEASE ORAL DAILY
Qty: 30 TABLET | Refills: 4 | Status: SHIPPED | OUTPATIENT
Start: 2020-08-25 | End: 2020-09-15

## 2020-08-25 RX ORDER — BUMETANIDE 0.5 MG/1
TABLET ORAL
Qty: 180 TABLET | Refills: 3 | Status: SHIPPED | OUTPATIENT
Start: 2020-08-25 | End: 2020-09-15

## 2020-08-25 NOTE — NURSING NOTE
Jim came in today for some self-dsng change education. He is in a position where his previously trained dsng change caregivers are unwilling or unable to do his DLES dsng change. We reviewed and practiced sterile glove donning. I showed him how to do the weekly dsng change on the simulation board. I had him do the weekly dsng change on the simulation board. Then, with coaching, Jim did the dsng change while seated in a wheelchair in front of the mirror on the back of the exam room door.    A: He had difficulty maintaining sterile technique donning sterile gloves. He practiced several times. Using the mirror, he did well putting on the weekly dsng change and the biopatch. He was frustrated by how difficult doing a self-dsng change is.    P: I recommended that he continue to have his wife or his wife's sister do the dsng change if possible. Alternatively, he could bring another friend or family member in for them to get training. I told him ultimately, he has our recommendation that he have a trained caregiver do the dsng change. He is free to follow that advice or not.

## 2020-08-25 NOTE — PROGRESS NOTES
20 ADDENDUM  Jim calling back.  Updated calendar.  He will take 5mg today and tomorrow and test on .  He will hold his ASA dose until after the [facility] calls with result on Thursday.  REGINALD Emerson            20  Left message for patient to call the [facility] back.  Per Supratherapeutic INR protocol for HM 2, patient is to come to the lab daily until INR is below 4.0.  He is to Hold Aspirin until INR is within the goal range.  Discussed with Pharmacist Jae Bedolla [facility] for patient's new Anticoagulation recommendation.  Requested Jim call the clinic if he is not going to follow the protocol.  Dosing on calendar is recommendation through Friday (if patient declines INR tomorrow or Thursday).    REGINALD Emerson  ANTICOAGULATION FOLLOW-UP CLINIC VISIT    Patient Name:  Jim Willingham  Date:  2020  Contact Type:  Telephone    SUBJECTIVE:  Patient Findings     Comments:   See note.        Clinical Outcomes     Comments:   See note.           OBJECTIVE    Recent labs: (last 7 days)     20  1142   INR 4.37*       ASSESSMENT / PLAN  INR assessment SUPRA    Recheck INR In: 2 DAYS    INR Location Clinic      Anticoagulation Summary  As of 2020    INR goal:   2.0-3.0   TTR:   54.3 % (11.3 mo)   INR used for dosin.37! (2020)   Warfarin maintenance plan:   5 mg (5 mg x 1) every Mon, Wed, Fri; 7.5 mg (5 mg x 1.5) all other days   Full warfarin instructions:   : 5 mg; Otherwise 5 mg every Mon, Wed, Fri; 7.5 mg all other days   Weekly warfarin total:   45 mg   Plan last modified:   Brendan Montoya RN (2020)   Next INR check:   2020   Priority:   Critical   Target end date:   Indefinite    Indications    LVAD (left ventricular assist device) present (H) [Z95.811]  Long-term (current) use of anticoagulants [Z79.01] [Z79.01]  Chronic systolic congestive heart failure (H) [I50.22]             Anticoagulation Episode Summary     INR check location:       Preferred lab:       Send INR  reminders to:   [facility]    Comments:   +++Patient drawn at Home care visit Q Mon. (8/10/20)+++  Labs drawn either at [facility] or [facility], See 3/5/20 ADDENDUM, pt. weekly INR >2.0 for Cardioversion  LVAD placed 6/19/17  HM II  ASA 81 mg daily      Anticoagulation Care Providers     Provider Role Specialty Phone number    Ulises Delisa Sprague MD Referring Cardiology [phone]            See the Encounter Report to view Anticoagulation Flowsheet and Dosing Calendar (Go to Encounters tab in chart review, and find the Anticoagulation Therapy Visit)    INR/CFX/F2 RESULT: INR result is 4.37    ASSESSMENT:Left message for patient with results and dosing recommendations. Asked patient to call back to report any missed doses, falls, signs and symptoms of bleeding or clotting, any changes in health, medication, or diet. Asked patient to call back with any questions or concerns.    DOSING ADJUSTMENT: Discussed with Pharmacist Jae Bedolla [facility] for patient's new Anticoagulation recommendation.    Recommended 5mg today, 5mg tomorrow, If not drawn on Thursday, 7.5mg on Thurs.    NEXT INR/FACTOR X OR FACTOR II:Please ask Jim when he will be going to the lab this week.  Recommended 8/26 or 8/27.    PROTOCOL FOLLOWED:LVAD supratherpeutic INR  Patient had LVAD placed on:   6/19/17  Type of LVAD: HM 2  Patient's current Aspirin dose: HOLDing until INR is therapeutic  LVAD Protocol followed:  Uncertain-depends on patient timeline to return to lab.   If Not Followed Explanation:  Brendan Garcia RN

## 2020-08-25 NOTE — PROGRESS NOTES
Jim said his weight is up 6 lbs in the past couple of weeks and he is feeling some shortness of breath. In early August, the cards team lowered his bumex dose and stopped his potassium chloride. His creatinine has improved from 3.3 to 1.25. His K is slightly low at 3.8. His LDH is lower now at 372.    I notified Soraya Marshall, the NP about SOB complaints and weight gain.

## 2020-08-25 NOTE — PROGRESS NOTES
Regarding previous note of weight gain and SOB, Soraay Marshall said to       increase Bumex to 1 mg in AM and 0.5 mg in the evening. Please initiate KCL 10 MEQ daily. Repeat BMP in 2 weeks.      Labs ordered for 9/7/2020. Jim notified.

## 2020-08-26 ENCOUNTER — TELEPHONE (OUTPATIENT)
Dept: TRANSPLANT | Facility: CLINIC | Age: 65
End: 2020-08-26

## 2020-08-26 ENCOUNTER — CARE COORDINATION (OUTPATIENT)
Dept: CARDIOLOGY | Facility: CLINIC | Age: 65
End: 2020-08-26

## 2020-08-26 DIAGNOSIS — I42.9 CARDIOMYOPATHY (H): Primary | ICD-10-CM

## 2020-08-26 DIAGNOSIS — E11.40 TYPE 2 DIABETES MELLITUS WITH DIABETIC NEUROPATHY, WITHOUT LONG-TERM CURRENT USE OF INSULIN (H): Primary | ICD-10-CM

## 2020-08-26 NOTE — LETTER
PRE-HEART TRANSPLANT EVALUATION/CLINIC APPOINTMENTS    Patient : Jim Willingham  MR#: [id number]  Coordinator: Bre DICKERSON [phone]  : Cherie [phone]  Dates: September 14, 2020 & September 15, 2020   September 21, 2020 & September 22, 2020 October 30, 2020  This is your evaluation schedule, please follow dates and times.  You will receive reminder phone calls for other tests, but please follow this schedule only!  If you have any questions about dates and times, please call us on number listed above.  Thank you, Transplant Clinic            Day/Date:   Monday, September 14, 2020  Time Location Activity   9:00 AM Telephone Visits: You will receive a telephone call to: [phone] Appointment with Kadie Pedro, Transplant , Caregiver Must Attend!!!   10:30 AM-12:30 PM Video Visit-Please review Video Visit Instructions sent via [employer] Transplant teaching; Bre Cárdenas RN, Transplant Coordinator  CAREGIVER MUST ATTEND!                            Day/Date:   Tuesday, September 15, 2020  Time Location Activity   11:15 AMAM [facility] [address]; [place] [address]  Imaging and Lab Testing  1st floor Blood tests  (Nothing to eat or drink after midnight)   11:30 AM [facility]  Imaging and Lab Testing  1st floor Dexa Scan  (No calcium 24 hours before test.)   12:00 PM [facility]  Imaging and Lab Testing  1st floor Bilateral Carotid Ultrasound  (You do not need to do anything special to prepare for your exam.)   1:00 PM [facility]  Imaging and Lab Testing  1st floor Bilateral Lower Extremity Arterial Ultrasound  (You do not need to do anything special to prepare for your exam.)   2:00 PM [facility]  Imaging and Lab Testing  1st floor Bilateral Lower Extremity Vascular Ultrasound  (You do not need to do anything special to prepare for your exam.)                   Day/Date:   Monday, September 21, 2020  Time Location  Activity   8:30 AM Telephone Visits: You will receive a telephone call to: [phone] Appointment with Red Hauser                                         Day/Date:   Tuesday, September 22, 2020  Time Location Activity   8:30 AM [facility]  Imaging and Lab Testing  1st floor DEONTE Doppler Ultrasound, No Exercise  (No caffeine or tobacco for 1 hour prior to exam.)   9:30 AM [facility]  Imaging and Lab Testing  1st floor Bilateral Upper Extremity Arterial Ultrasound  (You do not need to do anything special to prepare for your exam.)   10:30 AM [facility]  Imaging and Lab Testing  1st floor Bilateral Upper Extremity Vascular Ultrasound  (You do not need to do anything special to prepare for your exam.)   11:30 AM [facility]  Pulmonary Function Lab  3rd floor Six-Minute Walk   12:00 PM [facility]  Pulmonary Function Lab  3rd floor Pulmonary Function Test  (Please do not wear any perfume, deodorant or scented products)   1:15 PM [facility]  Imaging and Lab Testing  1st floor Abdominal Ultrasound  (Nothing to eat or drink after midnight)           Day/Date:   Friday, October 30, 2020  Time Location Activity   8:00 AM Video Visit-Please review Video Visit Instructions sent via [employer] Appointment with Dr. Gonzalez,   Palliative Care           Day/Date:    Every Thursday *OPTIONAL*  Time Location Activity   12:00 PM-1:00 PM Heart Transplant Support Group--Please review Heart Transplant Support Group Login Information Sheet Every Thursday *OPTIONAL*         Day/Date:    Every Thursday *OPTIONAL*  Time Location Activity   1:00 PM-2:00 PM VAD Support Group-- Please review VAD Support Group Login Information Sheet Every Tuesday*OPTIONAL*

## 2020-08-26 NOTE — TELEPHONE ENCOUNTER
Patient Call  08/26/20    Called patient and left message to discuss plan of care for transplant.  Asked patient to call transplant coordinator at [phone] (option 2) as soon as able.

## 2020-08-26 NOTE — PROGRESS NOTES
Called pt to follow-up on questions he sent via [employer] regarding CT scan results from 7/23. Reviewed results with Dr. Montgomery. MD stated the leukoariosis is small vessel disease, and at this point there is no need to worry or need for futher imaging or intervention. Pt is very relieved to hear this update.   Pt also reported he feels much better since increasing bumex yesterday. He lost 4lb overnight, and current weight is 230lb. He has no SOB. Pt thinks his dry weight may be lower than previously thought, at 230-232lb. He has an appt in clinic next week where volume status can be evaluated.   As pt has had some caregiver difficulties recently, reiterated that he will need a caregiver plan for transplant, much like he needed one for VAD implant. Pt had not considered this yet. He will discuss it with his wife so they can make a plan.   Pt stated he recently saw his PCP who is considering restarting metformin. Pt is hesitant about this, as he had a significant jump in creat recently and he is worried about affecting his kidney's. Pt is requesting an referral to endocrinology. Most recent hgbA1c was 7.5 (8/5/20). Ordered endocrinology referral per Dr. Montgomery.

## 2020-08-26 NOTE — TELEPHONE ENCOUNTER
Patient Call: General    Reason for call: Jim is simply trying to return Shameka Cárdenas's call. Can he ever talk to a real person? Sorry; Jim went from line to line. He just wants to talk to a real person.    Call back needed? Yes    Return Call Needed  Same as documented in contacts section  When to return call?: Same day: Route [facility]

## 2020-08-26 NOTE — Clinical Note
Appointment Request: Transplant Evaluation New or Return Visit: Return In Person Visit?: Yes-consults for social work, transplant teaching, palliative care and nutrition can all be done virtually or over the phone Reason for Visit/Diagnosis: Heart eval Orders Placed: Yes  Appointment Timeframe Requested: 9-2-20 for some imaging (can come a little earlier and stay a little later, but not a full day) and otherwise patient is available on M/T from 830-3pm Patient Aware? Yes. Physician Override Approved? N/A   Thank you,  Bre Cárdenas RN

## 2020-08-27 ENCOUNTER — TELEPHONE (OUTPATIENT)
Dept: ANTICOAGULATION | Facility: CLINIC | Age: 65
End: 2020-08-27

## 2020-08-27 DIAGNOSIS — Z95.811 LVAD (LEFT VENTRICULAR ASSIST DEVICE) PRESENT (H): ICD-10-CM

## 2020-08-27 DIAGNOSIS — Z79.01 LONG TERM CURRENT USE OF ANTICOAGULANT THERAPY: ICD-10-CM

## 2020-08-27 DIAGNOSIS — I50.22 CHRONIC SYSTOLIC CONGESTIVE HEART FAILURE (H): Primary | ICD-10-CM

## 2020-08-27 DIAGNOSIS — I50.22 CHRONIC SYSTOLIC CONGESTIVE HEART FAILURE (H): ICD-10-CM

## 2020-08-27 NOTE — TELEPHONE ENCOUNTER
Spoke to patient and verified timeframe for testing.  He is available Mondays and Tuesdays for his evaluation.  He will be at [facility] for a visit on 9-2-20 and agreed to do some of his testing that day as well.  Orders sent for scheduling.  Patient verbalized understanding of the plan and agrees to call Transplant Coordinator with any further questions or concerns.

## 2020-08-27 NOTE — TELEPHONE ENCOUNTER
8/27/20 Left message for patient to take 7.5mg of Coumadin tonight if he is unable to get to the lab today.  This writer reviewed Epic orders.  Jim has future lab orders placed.  ASA is being held for supratherapeutic INR on 8/25.     Brendan Mclaughlin RN

## 2020-08-28 ENCOUNTER — ANTICOAGULATION THERAPY VISIT (OUTPATIENT)
Dept: ANTICOAGULATION | Facility: CLINIC | Age: 65
End: 2020-08-28

## 2020-08-28 DIAGNOSIS — Z95.811 LVAD (LEFT VENTRICULAR ASSIST DEVICE) PRESENT (H): ICD-10-CM

## 2020-08-28 DIAGNOSIS — Z79.01 LONG TERM CURRENT USE OF ANTICOAGULANT THERAPY: ICD-10-CM

## 2020-08-28 DIAGNOSIS — I50.22 CHRONIC SYSTOLIC CONGESTIVE HEART FAILURE (H): ICD-10-CM

## 2020-08-28 LAB — INR PPP: 3.43 (ref 0.86–1.14)

## 2020-08-28 PROCEDURE — 36415 COLL VENOUS BLD VENIPUNCTURE: CPT | Performed by: INTERNAL MEDICINE

## 2020-08-28 PROCEDURE — 85610 PROTHROMBIN TIME: CPT | Performed by: INTERNAL MEDICINE

## 2020-08-28 NOTE — PROGRESS NOTES
ANTICOAGULATION FOLLOW-UP CLINIC VISIT    Patient Name:  Jim Willingham  Date:  8/28/2020  Contact Type:  Telephone    SUBJECTIVE:  Patient Findings     Comments:   Spoke with Jim.  INR today is 3.43.  He will continue to hold ASA until INR is therapeutic.  Jim has not been eating greens (except for iceberg lettuce)--recommended he eat two servings of green vegetables/week to help keep INR stable.  He agrees to try that.  He started amiodarone the end of June.  No other changes in meds, health.  He states he has been eating only once/day since the end of April and has lost about 50 pounds.          Clinical Outcomes     Comments:   Spoke with Jim.  INR today is 3.43.  He will continue to hold ASA until INR is therapeutic.  Jim has not been eating greens (except for iceberg lettuce)--recommended he eat two servings of green vegetables/week to help keep INR stable.  He agrees to try that.  He started amiodarone the end of June.  No other changes in meds, health.  He states he has been eating only once/day since the end of April and has lost about 50 pounds.             OBJECTIVE    Recent labs: (last 7 days)     08/28/20  1240   INR 3.43*       ASSESSMENT / PLAN  INR assessment SUPRA    Recheck INR In: 4 DAYS    INR Location Clinic      Anticoagulation Summary  As of 8/28/2020    INR goal:   2.0-3.0   TTR:   54.3 % (11.3 mo)   INR used for dosing:   3.43! (8/28/2020)   Warfarin maintenance plan:   5 mg (5 mg x 1) every Mon, Wed, Fri; 7.5 mg (5 mg x 1.5) all other days   Full warfarin instructions:   5 mg every Mon, Wed, Fri; 7.5 mg all other days   Weekly warfarin total:   45 mg   Plan last modified:   Brendan Montoya RN (8/17/2020)   Next INR check:   9/1/2020   Priority:   Critical   Target end date:   Indefinite    Indications    LVAD (left ventricular assist device) present (H) [Z95.811]  Long-term (current) use of anticoagulants [Z79.01] [Z79.01]  Chronic systolic congestive heart failure (H)  [I50.22]             Anticoagulation Episode Summary     INR check location:       Preferred lab:       Send INR reminders to:   [facility]    Comments:   +++Patient drawn at Home care visit Q Mon. (8/10/20)+++  Labs drawn either at [facility] or [facility], See 3/5/20 ADDENDUM, pt. weekly INR >2.0 for Cardioversion  LVAD placed 6/19/17  HM II  ASA 81 mg daily      Anticoagulation Care Providers     Provider Role Specialty Phone number    Delisa Montgomery MD Referring Cardiology [phone]            See the Encounter Report to view Anticoagulation Flowsheet and Dosing Calendar (Go to Encounters tab in chart review, and find the Anticoagulation Therapy Visit)    Spoke with Jim.  He knows to watch for signs of bleeding and to be seen urgently if he develops any.    Discussed with Pharmacist Jae Bedolla [facility] for patient's new Anticoagulation recommendation.    Patient had LVAD placed on:   6/19/17  Type of LVAD: HM 2   Patient's current Aspirin dose: he is holding warfarin until INR is therapeutic.  LVAD Protocol followed: Yes     Kirsty Bermudez RN

## 2020-08-29 NOTE — PROGRESS NOTES
"Jim Willingham is a 65 year old male who is being evaluated via a billable video visit.      The patient has been notified of following:     \"This video visit will be conducted via a call between you and your physician/provider. We have found that certain health care needs can be provided without the need for an in-person physical exam.  This service lets us provide the care you need with a video conversation.  If a prescription is necessary we can send it directly to your pharmacy.  If lab work is needed we can place an order for that and you can then stop by our lab to have the test done at a later time.    Video visits are billed at different rates depending on your insurance coverage.  Please reach out to your insurance provider with any questions.    If during the course of the call the physician/provider feels a video visit is not appropriate, you will not be charged for this service.\"    Patient has given verbal consent for Video visit? Yes  How would you like to obtain your AVS? Score [employer]  If you are dropped from the video visit, the video invite should be resent to: Other e-mail: Sharethrough  Will anyone else be joining your video visit? No        Video-Visit Details    Type of service:  Video Visit    Video Start Time: 9:44 AM  Video End Time: 10:02 AM    Originating Location (pt. Location): Home    Distant Location (provider location):  [facility]     Platform used for Video Visit: Pusher        [facility]  Clinic Initial Visit Note    Reason for visit: \"Pretransplant evaluation\"    HPI: 65 year old male with a history of CKD Stage III, DM Type II, CECILIA, HTN, COPD,  and NICM with EF 20% s/p LVAD  II (2017) seen as a nurse visit for pretransplant evaluation.    The patient carries a history of COPD.  Diagnosed in 2014 per his recollection.  However, was maintained just on albuterol until roughly 3 years ago when he was started on Incruse and " Chelcarli.  Since that time he has noticed significant improvement in his symptoms.  He uses albuterol very infrequently.  No recent history of exacerbations for COPD.  Since April, he has lost roughly 50 pounds and has noticed a significant improvement in his symptoms.  Previous, had difficulty going up and down the stairs or walking on flat ground.  Now, is able to go up the stairs with minimal time needed to resolve dyspnea.  Unfortunately, given that, pandemic, has done less physical activity outside.  However, he has begun using an exercise bike and  was able to use it 3 times last week for roughly 10 minutes at a time.    Previous pulmonary function test in 2017 showed very severe obstruction without bronchodilator response and normal diffusion capacity corrected for hemoglobin.  He is a previous smoker (less than 1 pack/week for a couple decades).  Had significant secondhand smoke exposure.  Quit in 1995.      PMH:  Past Medical History:   Diagnosis Date     Benign essential hypertension 5/11/2017     Cardiomyopathy, unspecified (H) 5/8/2017     CKD (chronic kidney disease) stage 3, GFR 30-59 ml/min (H) 5/11/2017     Depression 5/11/2017     Diabetes mellitus (H) 5/11/2017     H/O gastric bypass 5/11/2017     ICD (implantable cardioverter-defibrillator), biventricular, in situ 5/11/2017     LVAD (left ventricular assist device) present (H)      NICM (nonischemic cardiomyopathy) (H)/ EF 20% 5/11/2017    ECHO: LVEDd. 7.66 cm, Restrictive pattern , Severe mitral valve regurgitation     CECILIA (obstructive sleep apnea) 5/11/2017     Paroxysmal atrial fibrillation (H) 5/11/2017     Paroxysmal VT (H) 5/11/2017     Uncomplicated asthma      Vitamin B12 deficiency (non anemic) 5/11/2017       Allergies:  Allergies   Allergen Reactions     Beer Shortness Of Breath     Grass Shortness Of Breath     Ace Inhibitors Cough     Dust Mites Other (See Comments)     Asthma     Mold Other (See Comments)     Asthma     Penicillins Other  (See Comments)     Unknown - childhood exposure     Sulfa Drugs Other (See Comments) and Unknown     Unknown childhood reaction       Social History:  Social History     Socioeconomic History     Marital status:      Spouse name: Not on file     Number of children: Not on file     Years of education: Not on file     Highest education level: Not on file   Occupational History     Not on file   Social Needs     Financial resource strain: Not on file     Food insecurity     Worry: Not on file     Inability: Not on file     Transportation needs     Medical: Not on file     Non-medical: Not on file   Tobacco Use     Smoking status: Former Smoker     Last attempt to quit:      Years since quittin.6     Smokeless tobacco: Never Used   Substance and Sexual Activity     Alcohol use: No     Drug use: No     Sexual activity: Yes     Partners: Female   Lifestyle     Physical activity     Days per week: Not on file     Minutes per session: Not on file     Stress: Not on file   Relationships     Social connections     Talks on phone: Not on file     Gets together: Not on file     Attends Christianity service: Not on file     Active member of club or organization: Not on file     Attends meetings of clubs or organizations: Not on file     Relationship status: Not on file     Intimate partner violence     Fear of current or ex partner: Not on file     Emotionally abused: Not on file     Physically abused: Not on file     Forced sexual activity: Not on file   Other Topics Concern     Parent/sibling w/ CABG, MI or angioplasty before 65F 55M? No   Social History Narrative     Not on file       History of smoking: YES  Cigarettes: Less than 1 pack/week for decades, quit   Smoke cigars, quit     History of alcohol use: NO    History of recreational drug use: YES, remote marijuana use   Active user: NO    He is not currently employed.  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       Hot Tub Exposure: NO  Recent Travel:  NO   Hx of incarceration:  NO  Bird Exposure:   NO  Animal Exposure:  NO  Inhalation Exposure:  NO    Medications:  Current Outpatient Medications   Medication Sig Dispense Refill     acetaminophen (TYLENOL) 325 MG tablet Take 650 mg by mouth every 6 hours as needed for mild pain       albuterol (ALBUTEROL) 108 (90 BASE) MCG/ACT Inhaler Inhale 2 puffs into the lungs every 4 hours as needed for shortness of breath / dyspnea or wheezing       allopurinol (ZYLOPRIM) 100 MG tablet Take 1 tablet (100 mg) by mouth daily 60 tablet 5     amiodarone (PACERONE) 200 MG tablet Take 400 mg by mouth daily       aspirin 81 MG chewable tablet 1 tablet (81 mg) by Oral or Feeding Tube route daily 36 tablet      Benzocaine-Menthol (CEPACOL) 15-2.3 MG LOZG Take 1 lozenge by mouth every hour as needed (sore throat) 32 lozenge 0     blood glucose monitoring (ONE TOUCH ULTRA 2) meter device kit Use to test blood sugar four times daily or as directed. 1 kit 0     blood glucose VI test strip Use to test blood sugar four times daily or as directed. 200 each 0     bumetanide (BUMEX) 0.5 MG tablet Please take twice per day: 1mg in the morning and 0.5mg in the evening 180 tablet 3     citalopram (CELEXA) 20 MG tablet Take 20 mg by mouth At Bedtime        cyanocobalamin (VITAMIN B12) 1000 MCG/ML injection Inject 1,000 mcg into the muscle every 30 days       fluticasone-vilanterol (BREO ELLIPTA) 200-25 MCG/INH oral inhaler Inhale 1 puff into the lungs daily       gabapentin (NEURONTIN) 300 MG capsule Take 1 capsule (300 mg) by mouth twice daily and 2 capsules (600 mg) by mouth at bedtime daily.       hydrALAZINE (APRESOLINE) 10 MG tablet Take 3 tablets (30 mg) by mouth 3 times daily 545 tablet 3     insulin lispro (HUMALOG KWIKPEN) 100 UNIT/ML (1 unit dial) KWIKPEN Inject 1-7 Units Subcutaneous 4 times daily 3 mL 1     insulin pen needle (32G X 4 MM) 32G X 4 MM miscellaneous Use four pen needles daily or  as directed. 110 each 0     montelukast (SINGULAIR) 10 MG tablet Take 10 mg by mouth At Bedtime       pantoprazole (PROTONIX) 40 MG EC tablet Take 1 tablet (40 mg) by mouth every morning 60 tablet 5     potassium chloride ER (KLOR-CON M) 10 MEQ CR tablet Take 1 tablet (10 mEq) by mouth daily 30 tablet 4     predniSONE (DELTASONE) 20 MG tablet Take 1 tablet (20 mg) by mouth daily 14 tablet 0     traMADol (ULTRAM) 50 MG tablet Take 2 tablets (100 mg) by mouth every 6 hours as needed for moderate pain or breakthrough pain 28 tablet      umeclidinium (INCRUSE ELLIPTA) 62.5 MCG/INH oral inhaler Inhale 1 puff into the lungs daily       warfarin ANTICOAGULANT (COUMADIN ANTICOAGULANT) 5 MG tablet 7.5 mg tonight, 5 mg on Saturday, 7.5 mg on Sunday. Repeat INR Monday. 90 tablet 5     zinc sulfate (ZINCATE) 220 (50 Zn) MG capsule Take 220 mg by mouth 2 times daily         Family History:  Family History   Problem Relation Age of Onset     Cerebrovascular Disease Mother      Diabetes Mother      Hypertension Mother      Coronary Artery Disease Father      Diabetes Type 2  Father        Review of Systems:   Complete 10 point ROS negative unless mentioned in HPI    Physical Exam:    Constitutional - looks well, in no apparent distress  Eyes - no redness or discharge  Respiratory -breathing appears comfortable.  No cough.  Skin - No appreciable discoloration or lesions (very limited exam)  Neurological - No apparent tremors. Speech fluent and articlate  Psychiatric - no signs of delirium or anxiety     Exam limited to that easily identified on a virtual visit. The rest of a comprehensive physical examination is deferred due to PHE (public health emergency) video visit restrictions.      Labs and Radiology:  No new labs or imaging.     PFT's:    9/15/2017        PFT Interpretation:  Personally reviewed by me and shows very severe obstruction without bronchodilator response.  Diffusion capacity is normal corrected for  hemoglobin.    Assessment and Plan:  Jim Willingham is a 65 year old male with a history of CKD Stage III, DM Type II, CECILIA, HTN, COPD,  and NICM with EF 20% s/p LVAD HM II (2017) seen as a nurse visit for pretransplant evaluation.    Very severe COPD  Pre heart transplant evaluation  Patient with known COPD which is very severe based on PFTs from 2017.  He remains on triple maintenance inhaler therapy which is appropriate.  Fortunately, he has not had recent exacerbations for COPD.  More importantly, he has lost roughly 50 pounds which is been a significant improvement in his symptomatology.  While his airflow obstruction is very severe, the benefit of heart transplant greatly outweighs the risk given his underlying COPD poses. Given his age and co-morbities, his risk of post op respiratory failure (requiring mechanical ventilation > 48 hours) is between 5-10%.    -Good pulmonary hygiene post op (incentive spirometer, OOB to chair)  -Continue to increase physical activity as you are   -Given hx of CPAP, should be used with naps and at night while hospitalized    Chase Qureshi MD   of Medicine   Division of Pulmonary, Allergy, Critical Care and Sleep Medicine  [facility]  Pager: [phone]    The above note was dictated using voice recognition software and may include typographical errors. Please contact the author for any clarifications.

## 2020-08-31 ENCOUNTER — ANTICOAGULATION THERAPY VISIT (OUTPATIENT)
Dept: ANTICOAGULATION | Facility: CLINIC | Age: 65
End: 2020-08-31

## 2020-08-31 ENCOUNTER — ANCILLARY PROCEDURE (OUTPATIENT)
Dept: CARDIOLOGY | Facility: CLINIC | Age: 65
End: 2020-08-31
Attending: INTERNAL MEDICINE
Payer: COMMERCIAL

## 2020-08-31 ENCOUNTER — PRE VISIT (OUTPATIENT)
Dept: PULMONOLOGY | Facility: CLINIC | Age: 65
End: 2020-08-31

## 2020-08-31 ENCOUNTER — VIRTUAL VISIT (OUTPATIENT)
Dept: PULMONOLOGY | Facility: CLINIC | Age: 65
End: 2020-08-31
Attending: INTERNAL MEDICINE
Payer: COMMERCIAL

## 2020-08-31 DIAGNOSIS — Z95.811 LVAD (LEFT VENTRICULAR ASSIST DEVICE) PRESENT (H): Primary | ICD-10-CM

## 2020-08-31 DIAGNOSIS — Z79.01 LONG TERM CURRENT USE OF ANTICOAGULANT THERAPY: ICD-10-CM

## 2020-08-31 DIAGNOSIS — I42.9 CARDIOMYOPATHY (H): ICD-10-CM

## 2020-08-31 DIAGNOSIS — Z95.811 LVAD (LEFT VENTRICULAR ASSIST DEVICE) PRESENT (H): ICD-10-CM

## 2020-08-31 DIAGNOSIS — I50.22 CHRONIC SYSTOLIC CONGESTIVE HEART FAILURE (H): ICD-10-CM

## 2020-08-31 LAB — INR PPP: 3.71 (ref 0.86–1.14)

## 2020-08-31 PROCEDURE — 93295 DEV INTERROG REMOTE 1/2/MLT: CPT | Performed by: INTERNAL MEDICINE

## 2020-08-31 PROCEDURE — 36415 COLL VENOUS BLD VENIPUNCTURE: CPT | Performed by: INTERNAL MEDICINE

## 2020-08-31 PROCEDURE — 85610 PROTHROMBIN TIME: CPT | Performed by: INTERNAL MEDICINE

## 2020-08-31 PROCEDURE — 93296 REM INTERROG EVL PM/IDS: CPT | Mod: ZF

## 2020-08-31 NOTE — PROGRESS NOTES
ANTICOAGULATION FOLLOW-UP CLINIC VISIT    Patient Name:  Jim Willingham  Date:  8/31/2020  Contact Type:  Telephone    SUBJECTIVE:         OBJECTIVE    Recent labs: (last 7 days)     08/31/20  1053   INR 3.71*       ASSESSMENT / PLAN  INR assessment SUPRA    Recheck INR In: 9 DAYS    INR Location Clinic      Anticoagulation Summary  As of 8/31/2020    INR goal:   2.0-3.0   TTR:   54.0 % (11.3 mo)   INR used for dosing:   3.71! (8/31/2020)   Warfarin maintenance plan:   5 mg (5 mg x 1) every Mon, Wed, Fri; 7.5 mg (5 mg x 1.5) all other days   Full warfarin instructions:   8/31: 2.5 mg; 9/6: 5 mg; Otherwise 5 mg every Mon, Wed, Fri; 7.5 mg all other days   Weekly warfarin total:   45 mg   Plan last modified:   Brendan Montoya RN (8/17/2020)   Next INR check:   9/9/2020   Priority:   Critical   Target end date:   Indefinite    Indications    LVAD (left ventricular assist device) present (H) [Z95.811]  Long-term (current) use of anticoagulants [Z79.01] [Z79.01]  Chronic systolic congestive heart failure (H) [I50.22]             Anticoagulation Episode Summary     INR check location:       Preferred lab:       Send INR reminders to:   [facility]    Comments:   +++Patient drawn at Home care visit Q Mon. (8/10/20)+++  Labs drawn either at [facility] or [facility], See 3/5/20 ADDENDUM, pt. weekly INR >2.0 for Cardioversion  LVAD placed 6/19/17   II  ASA 81 mg daily      Anticoagulation Care Providers     Provider Role Specialty Phone number    Delisa Montgomery MD Referring Cardiology [phone]            See the Encounter Report to view Anticoagulation Flowsheet and Dosing Calendar (Go to Encounters tab in chart review, and find the Anticoagulation Therapy Visit)  Spoke with patient.  Patient had LVAD placed on:   6/19/17  Type of LVAD: HM2  Patient's current Aspirin dose: 81mg  LVAD Protocol followed:  Yes  Delisa Hillman RN

## 2020-08-31 NOTE — LETTER
"    8/31/2020         RE: Jim Willingham  [address] [facility] [address]        Dear Colleague,    Thank you for referring your patient, Jim Willingham, to the [facility]. Please see a copy of my visit note below.    Jim Willingham is a 65 year old male who is being evaluated via a billable video visit.      The patient has been notified of following:     \"This video visit will be conducted via a call between you and your physician/provider. We have found that certain health care needs can be provided without the need for an in-person physical exam.  This service lets us provide the care you need with a video conversation.  If a prescription is necessary we can send it directly to your pharmacy.  If lab work is needed we can place an order for that and you can then stop by our lab to have the test done at a later time.    Video visits are billed at different rates depending on your insurance coverage.  Please reach out to your insurance provider with any questions.    If during the course of the call the physician/provider feels a video visit is not appropriate, you will not be charged for this service.\"    Patient has given verbal consent for Video visit? Yes  How would you like to obtain your AVS? [email or web address]  If you are dropped from the video visit, the video invite should be resent to: Other e-mail: [employer]  Will anyone else be joining your video visit? No        Video-Visit Details    Type of service:  Video Visit    Video Start Time: 9:44 AM  Video End Time: 10:02 AM    Originating Location (pt. Location): Home    Distant Location (provider location):  [facility]     Platform used for Video Visit: [employer]        [facility]  Clinic Initial Visit Note    Reason for visit: \"Pretransplant evaluation\"    HPI: 65 year old male with a history of CKD Stage III, DM Type II, CECILIA, HTN, COPD,  and NICM with EF 20% s/p " LVAD  II (2017) seen as a nurse visit for pretransplant evaluation.    The patient carries a history of COPD.  Diagnosed in 2014 per his recollection.  However, was maintained just on albuterol until roughly 3 years ago when he was started on Incruse and Breo.  Since that time he has noticed significant improvement in his symptoms.  He uses albuterol very infrequently.  No recent history of exacerbations for COPD.  Since April, he has lost roughly 50 pounds and has noticed a significant improvement in his symptoms.  Previous, had difficulty going up and down the stairs or walking on flat ground.  Now, is able to go up the stairs with minimal time needed to resolve dyspnea.  Unfortunately, given that, pandemic, has done less physical activity outside.  However, he has begun using an exercise bike and  was able to use it 3 times last week for roughly 10 minutes at a time.    Previous pulmonary function test in 2017 showed very severe obstruction without bronchodilator response and normal diffusion capacity corrected for hemoglobin.  He is a previous smoker (less than 1 pack/week for a couple decades).  Had significant secondhand smoke exposure.  Quit in 1995.      PMH:  Past Medical History:   Diagnosis Date     Benign essential hypertension 5/11/2017     Cardiomyopathy, unspecified (H) 5/8/2017     CKD (chronic kidney disease) stage 3, GFR 30-59 ml/min (H) 5/11/2017     Depression 5/11/2017     Diabetes mellitus (H) 5/11/2017     H/O gastric bypass 5/11/2017     ICD (implantable cardioverter-defibrillator), biventricular, in situ 5/11/2017     LVAD (left ventricular assist device) present (H)      NICM (nonischemic cardiomyopathy) (H)/ EF 20% 5/11/2017    ECHO: LVEDd. 7.66 cm, Restrictive pattern , Severe mitral valve regurgitation     CECILIA (obstructive sleep apnea) 5/11/2017     Paroxysmal atrial fibrillation (H) 5/11/2017     Paroxysmal VT (H) 5/11/2017     Uncomplicated asthma      Vitamin B12 deficiency (non  anemic) 2017       Allergies:  Allergies   Allergen Reactions     Beer Shortness Of Breath     Grass Shortness Of Breath     Ace Inhibitors Cough     Dust Mites Other (See Comments)     Asthma     Mold Other (See Comments)     Asthma     Penicillins Other (See Comments)     Unknown - childhood exposure     Sulfa Drugs Other (See Comments) and Unknown     Unknown childhood reaction       Social History:  Social History     Socioeconomic History     Marital status:      Spouse name: Not on file     Number of children: Not on file     Years of education: Not on file     Highest education level: Not on file   Occupational History     Not on file   Social Needs     Financial resource strain: Not on file     Food insecurity     Worry: Not on file     Inability: Not on file     Transportation needs     Medical: Not on file     Non-medical: Not on file   Tobacco Use     Smoking status: Former Smoker     Last attempt to quit:      Years since quittin.6     Smokeless tobacco: Never Used   Substance and Sexual Activity     Alcohol use: No     Drug use: No     Sexual activity: Yes     Partners: Female   Lifestyle     Physical activity     Days per week: Not on file     Minutes per session: Not on file     Stress: Not on file   Relationships     Social connections     Talks on phone: Not on file     Gets together: Not on file     Attends Sikh service: Not on file     Active member of club or organization: Not on file     Attends meetings of clubs or organizations: Not on file     Relationship status: Not on file     Intimate partner violence     Fear of current or ex partner: Not on file     Emotionally abused: Not on file     Physically abused: Not on file     Forced sexual activity: Not on file   Other Topics Concern     Parent/sibling w/ CABG, MI or angioplasty before 65F 55M? No   Social History Narrative     Not on file       History of smoking: YES  Cigarettes: Less than 1 pack/week for decades,  quit 1995  Smoke cigars, quit 2017    History of alcohol use: NO    History of recreational drug use: YES, remote marijuana use   Active user: NO    He is not currently employed.  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      Hot Tub Exposure: NO  Recent Travel:  NO   Hx of incarceration:  NO  Bird Exposure:   NO  Animal Exposure:  NO  Inhalation Exposure:  NO    Medications:  Current Outpatient Medications   Medication Sig Dispense Refill     acetaminophen (TYLENOL) 325 MG tablet Take 650 mg by mouth every 6 hours as needed for mild pain       albuterol (ALBUTEROL) 108 (90 BASE) MCG/ACT Inhaler Inhale 2 puffs into the lungs every 4 hours as needed for shortness of breath / dyspnea or wheezing       allopurinol (ZYLOPRIM) 100 MG tablet Take 1 tablet (100 mg) by mouth daily 60 tablet 5     amiodarone (PACERONE) 200 MG tablet Take 400 mg by mouth daily       aspirin 81 MG chewable tablet 1 tablet (81 mg) by Oral or Feeding Tube route daily 36 tablet      Benzocaine-Menthol (CEPACOL) 15-2.3 MG LOZG Take 1 lozenge by mouth every hour as needed (sore throat) 32 lozenge 0     blood glucose monitoring (ONE TOUCH ULTRA 2) meter device kit Use to test blood sugar four times daily or as directed. 1 kit 0     blood glucose VI test strip Use to test blood sugar four times daily or as directed. 200 each 0     bumetanide (BUMEX) 0.5 MG tablet Please take twice per day: 1mg in the morning and 0.5mg in the evening 180 tablet 3     citalopram (CELEXA) 20 MG tablet Take 20 mg by mouth At Bedtime        cyanocobalamin (VITAMIN B12) 1000 MCG/ML injection Inject 1,000 mcg into the muscle every 30 days       fluticasone-vilanterol (BREO ELLIPTA) 200-25 MCG/INH oral inhaler Inhale 1 puff into the lungs daily       gabapentin (NEURONTIN) 300 MG capsule Take 1 capsule (300 mg) by mouth twice daily and 2 capsules (600 mg) by mouth at bedtime daily.       hydrALAZINE (APRESOLINE) 10 MG tablet Take 3 tablets  (30 mg) by mouth 3 times daily 545 tablet 3     insulin lispro (HUMALOG KWIKPEN) 100 UNIT/ML (1 unit dial) KWIKPEN Inject 1-7 Units Subcutaneous 4 times daily 3 mL 1     insulin pen needle (32G X 4 MM) 32G X 4 MM miscellaneous Use four pen needles daily or as directed. 110 each 0     montelukast (SINGULAIR) 10 MG tablet Take 10 mg by mouth At Bedtime       pantoprazole (PROTONIX) 40 MG EC tablet Take 1 tablet (40 mg) by mouth every morning 60 tablet 5     potassium chloride ER (KLOR-CON M) 10 MEQ CR tablet Take 1 tablet (10 mEq) by mouth daily 30 tablet 4     predniSONE (DELTASONE) 20 MG tablet Take 1 tablet (20 mg) by mouth daily 14 tablet 0     traMADol (ULTRAM) 50 MG tablet Take 2 tablets (100 mg) by mouth every 6 hours as needed for moderate pain or breakthrough pain 28 tablet      umeclidinium (INCRUSE ELLIPTA) 62.5 MCG/INH oral inhaler Inhale 1 puff into the lungs daily       warfarin ANTICOAGULANT (COUMADIN ANTICOAGULANT) 5 MG tablet 7.5 mg tonight, 5 mg on Saturday, 7.5 mg on Sunday. Repeat INR Monday. 90 tablet 5     zinc sulfate (ZINCATE) 220 (50 Zn) MG capsule Take 220 mg by mouth 2 times daily         Family History:  Family History   Problem Relation Age of Onset     Cerebrovascular Disease Mother      Diabetes Mother      Hypertension Mother      Coronary Artery Disease Father      Diabetes Type 2  Father        Review of Systems:   Complete 10 point ROS negative unless mentioned in HPI    Physical Exam:    Constitutional - looks well, in no apparent distress  Eyes - no redness or discharge  Respiratory -breathing appears comfortable.  No cough.  Skin - No appreciable discoloration or lesions (very limited exam)  Neurological - No apparent tremors. Speech fluent and articlate  Psychiatric - no signs of delirium or anxiety     Exam limited to that easily identified on a virtual visit. The rest of a comprehensive physical examination is deferred due to PHE (public health emergency) video visit  restrictions.      Labs and Radiology:  No new labs or imaging.     PFT's:    9/15/2017        PFT Interpretation:  Personally reviewed by me and shows very severe obstruction without bronchodilator response.  Diffusion capacity is normal corrected for hemoglobin.    Assessment and Plan:  Jim Willingham is a 65 year old male with a history of CKD Stage III, DM Type II, CECILIA, HTN, COPD,  and NICM with EF 20% s/p LVAD HM II (2017) seen as a nurse visit for pretransplant evaluation.    Very severe COPD  Pre heart transplant evaluation  Patient with known COPD which is very severe based on PFTs from 2017.  He remains on triple maintenance inhaler therapy which is appropriate.  Fortunately, he has not had recent exacerbations for COPD.  More importantly, he has lost roughly 50 pounds which is been a significant improvement in his symptomatology.  While his airflow obstruction is very severe, the benefit of heart transplant greatly outweighs the risk given his underlying COPD poses. Given his age and co-morbities, his risk of post op respiratory failure (requiring mechanical ventilation > 48 hours) is between 5-10%.    -Good pulmonary hygiene post op (incentive spirometer, OOB to chair)  -Continue to increase physical activity as you are   -Given hx of CPAP, should be used with naps and at night while hospitalized    Chase Qureshi MD   of Medicine   Division of Pulmonary, Allergy, Critical Care and Sleep Medicine  [facility]  Pager: [phone]    The above note was dictated using voice recognition software and may include typographical errors. Please contact the author for any clarifications.          Again, thank you for allowing me to participate in the care of your patient.        Sincerely,        Chase Qureshi MD

## 2020-08-31 NOTE — TELEPHONE ENCOUNTER
RECORDS RECEIVED FROM: internal/CE   DATE RECEIVED: internal    NOTES (FOR ALL VISITS) STATUS DETAILS   OFFICE NOTES from referring provider Internal  Delisa Montgomery MD    OFFICE NOTES from other specialist ce NC- 6.17.20 Maritza charlton      ED NOTES na    OPERATIVE REPORT  (thyroid, pituitary, adrenal, parathyroid) na    MEDICATION LIST Internal     IMAGING      DEXASCAN na    MRI (BRAIN) na    XR (Chest) CE/internal  HE- 5.22.19, 3.14.19, 10.7.18   intenral- 8.5.20, 1.20.20, 1.15.20   CT (HEAD/NECK/CHEST/ABDOMEN) Internal  8.5.20, 7.23.20, 2.13.20, 2.12.19    NUCLEAR  na    ULTRASOUND (HEAD/NECK) na    LABS     DIABETES: HBGA1C, CREATININE, FASTING LIPIDS, MICROALBUMIN URINE, POTASSIUM, TSH, T4    THYROID: TSH, T4, CBC, THYRODLONULIN, TOTAL T3, FREE T4, CALCITONIN, CEA Internal  HBGA1C 8.5.20   POTASSIUM 8.7.20  TSH-6.24.20  T4-6.24.20                Retinoid Dermatitis Normal Treatment: I recommended more frequent application of Cetaphil or CeraVe to the areas of dermatitis.

## 2020-09-02 ENCOUNTER — CARE COORDINATION (OUTPATIENT)
Dept: CARDIOLOGY | Facility: CLINIC | Age: 65
End: 2020-09-02

## 2020-09-03 ENCOUNTER — TELEPHONE (OUTPATIENT)
Dept: TRANSPLANT | Facility: CLINIC | Age: 65
End: 2020-09-03

## 2020-09-03 NOTE — TELEPHONE ENCOUNTER
Patient Call: General    Reason for call: patient called and can not do the appointments that are schedule for him. Patient has asked that he be schedule on Mondays and Tuesdays from 830 am to 2:00 pm for appointment and the ones he has know need to be cancelled.    Call back needed? Yes    Return Call Needed  Same as documented in contacts section  When to return call?: Same day: Route High Priority

## 2020-09-03 NOTE — TELEPHONE ENCOUNTER
Left message for patient to schedule Heart Transplant Evaluation appointments.  Asked patient to call back to schedule.

## 2020-09-08 NOTE — PROGRESS NOTES
Pt had an appt with ROGER Rivera on 9/2. Pt left voicemail for VAD coordinator stating he couldn't make it to his appt due to  issues, and that he had sent a request in my chart to reschedule. Request forwarded to schedulers.

## 2020-09-08 NOTE — TELEPHONE ENCOUNTER
Spoke with the patient and confirmed Evals: Pre-Heart Eval appointments on 9/14/20, 9/15/20, 9/21/20, 9/22/20 and 10/30/20.  Informed patient an itinerary can be accessed via [employer], and will be sent via email.

## 2020-09-08 NOTE — PROGRESS NOTES
"dm 2  [facility]    Patients Glucose Data was Reached patient but they declined to share any data because dexcom meter went out and is waiting on new meter     Jim Willingham is a 65 year old male who is being evaluated via a billable video visit.      The patient has been notified of following:     \"This video visit will be conducted via a call between you and your physician/provider. We have found that certain health care needs can be provided without the need for an in-person physical exam.  This service lets us provide the care you need with a video conversation.  If a prescription is necessary we can send it directly to your pharmacy.  If lab work is needed we can place an order for that and you can then stop by our lab to have the test done at a later time.    Video visits are billed at different rates depending on your insurance coverage.  Please reach out to your insurance provider with any questions.    If during the course of the call the physician/provider feels a video visit is not appropriate, you will not be charged for this service.\"    Patient has given verbal consent for Video visit? Yes  How would you like to obtain your AVS? Mail a copy  If you are dropped from the video visit, the video invite should be resent to: Send to e-mail at: [email or web address]  Will anyone else be joining your video visit? No        Video-Visit Details    Type of service:  Video Visit    Video Start Time:3:09 pm  End: 3:45 pm    Originating Location (pt. Location): Home    Distant Location (provider location):  [facility] ENDOCRINOLOGY     Platform used for Video Visit: [employer]                                                                               - Endocrinology Initial Consultation -    Reason for visit/consult:  DM2    Primary care provider: Jovany Salas    HPI: A 64 yo male here for the evaluation for his DM.  Patient had a recent hospitalization on August 5, 2024 WALTER on CKD.   He was on a small dose " of metformin however metformin was discontinued and currently he is on Humalog sliding scale since this hospitalization.  His diabetes was also diagnosed in 1995 and he was at some point on small dose of insulin then in 2003 he underwent gastric bypass surgery he lost a significant amount of weight and he has not required any diabetes medication for the next 18 years.  Past 5 years he was on the metformin and the dose was reduced from 1000 twice daily to 500 twice daily and 500 to daily.   Since this admission he was on the Dexcom which he likes and helping and currently waiting for another transmitter.  She recalls last time morning glucose was 142 and usually less than 200.  He is taking steroid prednisone 20 mg for the past 3 years, according to the patient for the carpal tunnel but not clear.  Other medical history he has COPD sleep apnea, hypertension, he has LVAD for heart failure since 2017.   He has family history of diabetes both mother and father with diabetes.  She is retired he used to be a respiratory therapist, he is living with his wife and 1 adopted to grand daughter.    Current Diabetic Regimens:  Humalog sliding scale    Lantus 6 unit daily am (start from 9/2020-)    Life Style:  Wake up  Breakfast coffee only  Lunch snacks  Dinner healthy meal once a day  Bedtime    Exercise:      DM complications:  Retinopathy: no issue 7/2020  Nephropathy:   Neuropathy: some+  Most recent LDL:   LDL Cholesterol Calculated   Date Value Ref Range Status   07/06/2018 128 (H) <100 mg/dL Final     Comment:     Above desirable:  100-129 mg/dl  Borderline High:  130-159 mg/dL  High:             160-189 mg/dL  Very high:       >189 mg/dl     ]    Glucose Log: not available today    A1C trends from previous labs:   Hemoglobin A1C   Date Value Ref Range Status   08/05/2020 7.5 (H) 0 - 5.6 % Final     Comment:     Normal <5.7% Prediabetes 5.7-6.4%  Diabetes 6.5% or higher - adopted from ADA   consensus guidelines.      01/21/2020 7.0 (H) 0 - 5.6 % Final     Comment:     Normal <5.7% Prediabetes 5.7-6.4%  Diabetes 6.5% or higher - adopted from ADA   consensus guidelines.     05/24/2017 6.7 (H) 4.3 - 6.0 % Final        Past Medical/Surgical History:  Past Medical History:   Diagnosis Date     Benign essential hypertension 5/11/2017     Cardiomyopathy, unspecified (H) 5/8/2017     CKD (chronic kidney disease) stage 3, GFR 30-59 ml/min (H) 5/11/2017     Depression 5/11/2017     Diabetes mellitus (H) 5/11/2017     H/O gastric bypass 5/11/2017     ICD (implantable cardioverter-defibrillator), biventricular, in situ 5/11/2017     LVAD (left ventricular assist device) present (H)      NICM (nonischemic cardiomyopathy) (H)/ EF 20% 5/11/2017    ECHO: LVEDd. 7.66 cm, Restrictive pattern , Severe mitral valve regurgitation     CECILIA (obstructive sleep apnea) 5/11/2017     Paroxysmal atrial fibrillation (H) 5/11/2017     Paroxysmal VT (H) 5/11/2017     Uncomplicated asthma      Vitamin B12 deficiency (non anemic) 5/11/2017     Past Surgical History:   Procedure Laterality Date     ANESTHESIA CARDIOVERSION N/A 5/11/2020    Procedure: ANESTHESIA, FOR CARDIOVERSION @1100;  Surgeon: GENERIC ANESTHESIA PROVIDER;  Location:  OR     CV RIGHT HEART CATH N/A 7/24/2019    Procedure: CV RIGHT HEART CATH;  Surgeon: Renu Sears MD;  Location:  HEART CARDIAC CATH LAB     CV RIGHT HEART CATH N/A 8/5/2020    Procedure: CV RIGHT HEART CATH;  Surgeon: Nicola Seth MD;  Location:  HEART CARDIAC CATH LAB     GI SURGERY  2003    [facility]     INSERT VENTRICULAR ASSIST DEVICE LEFT (HEARTMATE II) N/A 6/19/2017    Procedure: INSERT VENTRICULAR ASSIST DEVICE LEFT (HEARTMATE II);  Median Sternotomy Heartmate II Left Ventricular Assist Device Insertion on Pump Oxygenator;  Surgeon: Ronnie Quigley MD;  Location:  OR     ORTHOPEDIC SURGERY  1994    right knee wired       Allergies:  Allergies   Allergen Reactions     Beer Shortness Of Breath     Grass  Shortness Of Breath     Ace Inhibitors Cough     Dust Mites Other (See Comments)     Asthma     Mold Other (See Comments)     Asthma     Penicillins Other (See Comments)     Unknown - childhood exposure     Sulfa Drugs Other (See Comments) and Unknown     Unknown childhood reaction       Current Medications   Current Outpatient Medications   Medication     acetaminophen (TYLENOL) 325 MG tablet     albuterol (ALBUTEROL) 108 (90 BASE) MCG/ACT Inhaler     allopurinol (ZYLOPRIM) 100 MG tablet     amiodarone (PACERONE) 200 MG tablet     aspirin 81 MG chewable tablet     blood glucose monitoring (ONE TOUCH ULTRA 2) meter device kit     blood glucose VI test strip     bumetanide (BUMEX) 0.5 MG tablet     citalopram (CELEXA) 20 MG tablet     cyanocobalamin (VITAMIN B12) 1000 MCG/ML injection     fluticasone-vilanterol (BREO ELLIPTA) 200-25 MCG/INH oral inhaler     gabapentin (NEURONTIN) 300 MG capsule     hydrALAZINE (APRESOLINE) 10 MG tablet     insulin lispro (HUMALOG KWIKPEN) 100 UNIT/ML (1 unit dial) KWIKPEN     insulin pen needle (32G X 4 MM) 32G X 4 MM miscellaneous     montelukast (SINGULAIR) 10 MG tablet     pantoprazole (PROTONIX) 40 MG EC tablet     potassium chloride ER (KLOR-CON M) 10 MEQ CR tablet     predniSONE (DELTASONE) 20 MG tablet     traMADol (ULTRAM) 50 MG tablet     umeclidinium (INCRUSE ELLIPTA) 62.5 MCG/INH oral inhaler     warfarin ANTICOAGULANT (COUMADIN ANTICOAGULANT) 5 MG tablet     zinc sulfate (ZINCATE) 220 (50 Zn) MG capsule     Benzocaine-Menthol (CEPACOL) 15-2.3 MG LOZG     No current facility-administered medications for this visit.        Family History:  Family History   Problem Relation Age of Onset     Cerebrovascular Disease Mother      Diabetes Mother      Hypertension Mother      Coronary Artery Disease Father      Diabetes Type 2  Father        Social History:  Social History     Tobacco Use     Smoking status: Former Smoker     Last attempt to quit: 1995     Years since quitting:  25.7     Smokeless tobacco: Never Used   Substance Use Topics     Alcohol use: No       ROS:  Full review of systems taken with the help of the intake sheet. Otherwise a complete 14 point review of systems was taken and is negative unless stated in the history above.      Physical Exam:   Vitals: There were no vitals taken for this visit.  BMI= There is no height or weight on file to calculate BMI.   General: well appearing, no acute distress, pleasant and conversant,   Mental Status/neuro: alert and oriented  Face: symmetrical, normal facial color  Eyes: anicteric, no proptosis or lid lag  Resp: no acute distress      Labs : I reviewed data from epic and extract and summarize the pertinent data here.   Lab Results   Component Value Date     08/24/2020      Lab Results   Component Value Date    POTASSIUM 3.8 08/24/2020     Lab Results   Component Value Date    CHLORIDE 107 08/24/2020     Lab Results   Component Value Date    QAMAR 8.5 08/24/2020     Lab Results   Component Value Date    CO2 25 08/24/2020     Lab Results   Component Value Date    BUN 25 08/24/2020     Lab Results   Component Value Date    CR 1.25 08/24/2020     Lab Results   Component Value Date    GLC 76 08/24/2020     Lab Results   Component Value Date    TSH 1.30 06/24/2020     No results found for: T4  Lab Results   Component Value Date    A1C 7.5 08/05/2020           Assessment and Plan  65 year old male with DM2, CKD WALTER, chronic steroid use    DM2 A1c 7.5 in 8/2020    Chronic steroid use prednisone 20 mg for the past 3 years per patient    CKD WALTER    Heart failure    - Will start Lantus 6 unit in the morning    - continue Humalog but only for dinner time (sliding scale),    - down the road, recommend to learn carb counting for dinner time humalog dose    - if CKD stable for the next more than 6 month, then may consider to resume small dose of Metofmin but at this point, I prefer to recommend to stay on small dose of insulin.    RTC with  our PA in 3 month          Jacque Mims MD  Staff Physician  Endocrinology and Metabolism  License: [id number]

## 2020-09-09 ENCOUNTER — TELEPHONE (OUTPATIENT)
Dept: ANTICOAGULATION | Facility: CLINIC | Age: 65
End: 2020-09-09

## 2020-09-09 ENCOUNTER — PRE VISIT (OUTPATIENT)
Dept: ENDOCRINOLOGY | Facility: CLINIC | Age: 65
End: 2020-09-09

## 2020-09-09 ENCOUNTER — VIRTUAL VISIT (OUTPATIENT)
Dept: ENDOCRINOLOGY | Facility: CLINIC | Age: 65
End: 2020-09-09
Attending: INTERNAL MEDICINE

## 2020-09-09 DIAGNOSIS — I50.22 CHRONIC SYSTOLIC CONGESTIVE HEART FAILURE (H): ICD-10-CM

## 2020-09-09 DIAGNOSIS — N18.30 CKD (CHRONIC KIDNEY DISEASE) STAGE 3, GFR 30-59 ML/MIN (H): ICD-10-CM

## 2020-09-09 DIAGNOSIS — E09.9 STEROID-INDUCED DIABETES MELLITUS, SEQUELA (H): ICD-10-CM

## 2020-09-09 DIAGNOSIS — T38.0X5S STEROID-INDUCED DIABETES MELLITUS, SEQUELA (H): ICD-10-CM

## 2020-09-09 DIAGNOSIS — Z79.01 LONG TERM CURRENT USE OF ANTICOAGULANT THERAPY: ICD-10-CM

## 2020-09-09 DIAGNOSIS — E11.65 TYPE 2 DIABETES MELLITUS WITH HYPERGLYCEMIA, WITH LONG-TERM CURRENT USE OF INSULIN (H): Primary | ICD-10-CM

## 2020-09-09 DIAGNOSIS — I50.9 CONGESTIVE HEART FAILURE, UNSPECIFIED HF CHRONICITY, UNSPECIFIED HEART FAILURE TYPE (H): ICD-10-CM

## 2020-09-09 DIAGNOSIS — Z79.4 TYPE 2 DIABETES MELLITUS WITH HYPERGLYCEMIA, WITH LONG-TERM CURRENT USE OF INSULIN (H): Primary | ICD-10-CM

## 2020-09-09 DIAGNOSIS — Z95.811 LVAD (LEFT VENTRICULAR ASSIST DEVICE) PRESENT (H): ICD-10-CM

## 2020-09-09 NOTE — LETTER
"9/9/2020       RE: Jim Willingham  [address] [facility] [id number]     Dear Colleague,    Thank you for referring your patient, Jim Willingham, to the [facility] ENDOCRINOLOGY at [facility]. Please see a copy of my visit note below.    dm 2  Pilo Boyd, [facility]    Patients Glucose Data was Reached patient but they declined to share any data because dexcom meter went out and is waiting on new meter     Jim Willingham is a 65 year old male who is being evaluated via a billable video visit.      The patient has been notified of following:     \"This video visit will be conducted via a call between you and your physician/provider. We have found that certain health care needs can be provided without the need for an in-person physical exam.  This service lets us provide the care you need with a video conversation.  If a prescription is necessary we can send it directly to your pharmacy.  If lab work is needed we can place an order for that and you can then stop by our lab to have the test done at a later time.    Video visits are billed at different rates depending on your insurance coverage.  Please reach out to your insurance provider with any questions.    If during the course of the call the physician/provider feels a video visit is not appropriate, you will not be charged for this service.\"    Patient has given verbal consent for Video visit? Yes  How would you like to obtain your AVS? Mail a copy  If you are dropped from the video visit, the video invite should be resent to: Send to e-mail at: [email or web address]  Will anyone else be joining your video visit? No        Video-Visit Details    Type of service:  Video Visit    Video Start Time:3:09 pm  End: 3:45 pm    Originating Location (pt. Location): Home    Distant Location (provider location):  [facility] ENDOCRINOLOGY     Platform used for Video Visit: Doximgabby                                                                   "             - Endocrinology Initial Consultation -    Reason for visit/consult:  DM2    Primary care provider: Jovany Salas    HPI: A 66 yo male here for the evaluation for his DM.  Patient had a recent hospitalization on August 5, 2024 WALTER on CKD.   He was on a small dose of metformin however metformin was discontinued and currently he is on Humalog sliding scale since this hospitalization.  His diabetes was also diagnosed in 1995 and he was at some point on small dose of insulin then in 2003 he underwent gastric bypass surgery he lost a significant amount of weight and he has not required any diabetes medication for the next 18 years.  Past 5 years he was on the metformin and the dose was reduced from 1000 twice daily to 500 twice daily and 500 to daily.   Since this admission he was on the Dexcom which he likes and helping and currently waiting for another transmitter.  She recalls last time morning glucose was 142 and usually less than 200.  He is taking steroid prednisone 20 mg for the past 3 years, according to the patient for the carpal tunnel but not clear.  Other medical history he has COPD sleep apnea, hypertension, he has LVAD for heart failure since 2017.   He has family history of diabetes both mother and father with diabetes.  She is retired he used to be a respiratory therapist, he is living with his wife and 1 adopted to grand daughter.    Current Diabetic Regimens:  Humalog sliding scale    Lantus 6 unit daily am (start from 9/2020-)    Life Style:  Wake up  Breakfast coffee only  Lunch snacks  Dinner healthy meal once a day  Bedtime    Exercise:      DM complications:  Retinopathy: no issue 7/2020  Nephropathy:   Neuropathy: some+  Most recent LDL:   LDL Cholesterol Calculated   Date Value Ref Range Status   07/06/2018 128 (H) <100 mg/dL Final     Comment:     Above desirable:  100-129 mg/dl  Borderline High:  130-159 mg/dL  High:             160-189 mg/dL  Very high:       >189 mg/dl      ]    Glucose Log: not available today    A1C trends from previous labs:   Hemoglobin A1C   Date Value Ref Range Status   08/05/2020 7.5 (H) 0 - 5.6 % Final     Comment:     Normal <5.7% Prediabetes 5.7-6.4%  Diabetes 6.5% or higher - adopted from ADA   consensus guidelines.     01/21/2020 7.0 (H) 0 - 5.6 % Final     Comment:     Normal <5.7% Prediabetes 5.7-6.4%  Diabetes 6.5% or higher - adopted from ADA   consensus guidelines.     05/24/2017 6.7 (H) 4.3 - 6.0 % Final        Past Medical/Surgical History:  Past Medical History:   Diagnosis Date     Benign essential hypertension 5/11/2017     Cardiomyopathy, unspecified (H) 5/8/2017     CKD (chronic kidney disease) stage 3, GFR 30-59 ml/min (H) 5/11/2017     Depression 5/11/2017     Diabetes mellitus (H) 5/11/2017     H/O gastric bypass 5/11/2017     ICD (implantable cardioverter-defibrillator), biventricular, in situ 5/11/2017     LVAD (left ventricular assist device) present (H)      NICM (nonischemic cardiomyopathy) (H)/ EF 20% 5/11/2017    ECHO: LVEDd. 7.66 cm, Restrictive pattern , Severe mitral valve regurgitation     CECILIA (obstructive sleep apnea) 5/11/2017     Paroxysmal atrial fibrillation (H) 5/11/2017     Paroxysmal VT (H) 5/11/2017     Uncomplicated asthma      Vitamin B12 deficiency (non anemic) 5/11/2017     Past Surgical History:   Procedure Laterality Date     ANESTHESIA CARDIOVERSION N/A 5/11/2020    Procedure: ANESTHESIA, FOR CARDIOVERSION @1100;  Surgeon: GENERIC ANESTHESIA PROVIDER;  Location: [facility] OR     CV RIGHT HEART CATH N/A 7/24/2019    Procedure: CV RIGHT HEART CATH;  Surgeon: Renu Sears MD;  Location: [facility] HE[facility] CARDIAC CATH LAB     CV RIGHT HEART CATH N/A 8/5/2020    Procedure: CV RIGHT HEART CATH;  Surgeon: Nicola Seth MD;  [facility]     GI SURGERY  2003    [facility] en [facility]     INSERT VENTRICULAR ASSIST DEVICE LEFT (HEARTMATE II) N/A 6/19/2017    Procedure: INSERT VENTRICULAR ASSIST DEVICE LEFT (HEARTMATE  II);  Median Sternotomy Heartmate II Left Ventricular Assist Device Insertion on Pump Oxygenator;  Surgeon: Ronnie Quilgey MD;  Location: [facility] OR     ORTHOPEDIC SURGERY  1994    right knee wired       Allergies:  Allergies   Allergen Reactions     Beer Shortness Of Breath     Grass Shortness Of Breath     Ace Inhibitors Cough     Dust Mites Other (See Comments)     Asthma     Mold Other (See Comments)     Asthma     Penicillins Other (See Comments)     Unknown - childhood exposure     Sulfa Drugs Other (See Comments) and Unknown     Unknown childhood reaction       Current Medications   Current Outpatient Medications   Medication     acetaminophen (TYLENOL) 325 MG tablet     albuterol (ALBUTEROL) 108 (90 BASE) MCG/ACT Inhaler     allopurinol (ZYLOPRIM) 100 MG tablet     amiodarone (PACERONE) 200 MG tablet     aspirin 81 MG chewable tablet     blood glucose monitoring (ONE TOUCH ULTRA 2) meter device kit     blood glucose VI test strip     bumetanide (BUMEX) 0.5 MG tablet     citalopram (CELEXA) 20 MG tablet     cyanocobalamin (VITAMIN B12) 1000 MCG/ML injection     fluticasone-vilanterol (BREO ELLIPTA) 200-25 MCG/INH oral inhaler     gabapentin (NEURONTIN) 300 MG capsule     hydrALAZINE (APRESOLINE) 10 MG tablet     insulin lispro (HUMALOG KWIKPEN) 100 UNIT/ML (1 unit dial) KWIKPEN     insulin pen needle (32G X 4 MM) 32G X 4 MM miscellaneous     montelukast (SINGULAIR) 10 MG tablet     pantoprazole (PROTONIX) 40 MG EC tablet     potassium chloride ER (KLOR-CON M) 10 MEQ CR tablet     predniSONE (DELTASONE) 20 MG tablet     traMADol (ULTRAM) 50 MG tablet     umeclidinium (INCRUSE ELLIPTA) 62.5 MCG/INH oral inhaler     warfarin ANTICOAGULANT (COUMADIN ANTICOAGULANT) 5 MG tablet     zinc sulfate (ZINCATE) 220 (50 Zn) MG capsule     Benzocaine-Menthol (CEPACOL) 15-2.3 MG LOZG     No current facility-administered medications for this visit.        Family History:  Family History   Problem Relation Age of Onset      Cerebrovascular Disease Mother      Diabetes Mother      Hypertension Mother      Coronary Artery Disease Father      Diabetes Type 2  Father        Social History:  Social History     Tobacco Use     Smoking status: Former Smoker     Last attempt to quit:      Years since quittin.7     Smokeless tobacco: Never Used   Substance Use Topics     Alcohol use: No       ROS:  Full review of systems taken with the help of the intake sheet. Otherwise a complete 14 point review of systems was taken and is negative unless stated in the history above.      Physical Exam:   Vitals: There were no vitals taken for this visit.  BMI= There is no height or weight on file to calculate BMI.   General: well appearing, no acute distress, pleasant and conversant,   Mental Status/neuro: alert and oriented  Face: symmetrical, normal facial color  Eyes: anicteric, no proptosis or lid lag  Resp: no acute distress      Labs : I reviewed data from epic and extract and summarize the pertinent data here.   Lab Results   Component Value Date     2020      Lab Results   Component Value Date    POTASSIUM 3.8 2020     Lab Results   Component Value Date    CHLORIDE 107 2020     Lab Results   Component Value Date    QAMAR 8.5 2020     Lab Results   Component Value Date    CO2 25 2020     Lab Results   Component Value Date    BUN 25 2020     Lab Results   Component Value Date    CR 1.25 2020     Lab Results   Component Value Date    GLC 76 2020     Lab Results   Component Value Date    TSH 1.30 2020     No results found for: T4  Lab Results   Component Value Date    A1C 7.5 2020           Assessment and Plan  65 year old male with DM2, CKD WALTER, chronic steroid use    DM2 A1c 7.5 in 2020    Chronic steroid use prednisone 20 mg for the past 3 years per patient    CKD WALTER    Heart failure    - Will start Lantus 6 unit in the morning    - continue Humalog but only for dinner time  (sliding scale),    - down the road, recommend to learn carb counting for dinner time humalog dose    - if CKD stable for the next more than 6 month, then may consider to resume small dose of Metofmin but at this point, I prefer to recommend to stay on small dose of insulin.    RTC with our PA in 3 month          Jacque Mims MD  Staff Physician  Endocrinology and Metabolism  License: [id number]

## 2020-09-09 NOTE — TELEPHONE ENCOUNTER
Called the patient and left message indicating an INR is due. Reminder given to have this done as soon as possible or contact the clinic.    Addendum 9/9/20 Pt called back and he reports he will get an INR on Thursday 9/10. Radha Pedro RN

## 2020-09-10 ENCOUNTER — ANTICOAGULATION THERAPY VISIT (OUTPATIENT)
Dept: ANTICOAGULATION | Facility: CLINIC | Age: 65
End: 2020-09-10

## 2020-09-10 ENCOUNTER — VIRTUAL VISIT (OUTPATIENT)
Dept: CARDIOLOGY | Facility: CLINIC | Age: 65
End: 2020-09-10
Attending: INTERNAL MEDICINE
Payer: COMMERCIAL

## 2020-09-10 ENCOUNTER — TELEPHONE (OUTPATIENT)
Dept: ENDOCRINOLOGY | Facility: CLINIC | Age: 65
End: 2020-09-10

## 2020-09-10 DIAGNOSIS — D50.9 IRON DEFICIENCY ANEMIA, UNSPECIFIED IRON DEFICIENCY ANEMIA TYPE: Primary | ICD-10-CM

## 2020-09-10 DIAGNOSIS — I50.22 CHRONIC SYSTOLIC CONGESTIVE HEART FAILURE (H): ICD-10-CM

## 2020-09-10 DIAGNOSIS — I42.9 CARDIOMYOPATHY (H): ICD-10-CM

## 2020-09-10 DIAGNOSIS — Z95.811 LVAD (LEFT VENTRICULAR ASSIST DEVICE) PRESENT (H): ICD-10-CM

## 2020-09-10 DIAGNOSIS — I48.0 PAROXYSMAL ATRIAL FIBRILLATION (H): ICD-10-CM

## 2020-09-10 DIAGNOSIS — E08.22 DIABETES MELLITUS DUE TO UNDERLYING CONDITION WITH CHRONIC KIDNEY DISEASE, WITHOUT LONG-TERM CURRENT USE OF INSULIN, UNSPECIFIED CKD STAGE (H): Primary | ICD-10-CM

## 2020-09-10 DIAGNOSIS — Z79.01 LONG TERM CURRENT USE OF ANTICOAGULANT THERAPY: ICD-10-CM

## 2020-09-10 LAB
ALBUMIN SERPL-MCNC: 3.8 G/DL (ref 3.4–5)
ALBUMIN UR-MCNC: 10 MG/DL
ALP SERPL-CCNC: 94 U/L (ref 40–150)
ALT SERPL W P-5'-P-CCNC: 48 U/L (ref 0–70)
ANION GAP SERPL CALCULATED.3IONS-SCNC: 7 MMOL/L (ref 3–14)
APPEARANCE UR: CLEAR
AST SERPL W P-5'-P-CCNC: 42 U/L (ref 0–45)
BACTERIA #/AREA URNS HPF: ABNORMAL /HPF
BILIRUB SERPL-MCNC: 0.8 MG/DL (ref 0.2–1.3)
BILIRUB UR QL STRIP: NEGATIVE
BUN SERPL-MCNC: 30 MG/DL (ref 7–30)
CALCIUM SERPL-MCNC: 8.4 MG/DL (ref 8.5–10.1)
CHLORIDE SERPL-SCNC: 103 MMOL/L (ref 94–109)
CO2 SERPL-SCNC: 28 MMOL/L (ref 20–32)
COLOR UR AUTO: YELLOW
CREAT SERPL-MCNC: 1.48 MG/DL (ref 0.66–1.25)
ERYTHROCYTE [DISTWIDTH] IN BLOOD BY AUTOMATED COUNT: 15.9 % (ref 10–15)
GFR SERPL CREATININE-BSD FRML MDRD: 50 ML/MIN/{1.73_M2}
GLUCOSE SERPL-MCNC: 180 MG/DL (ref 70–99)
GLUCOSE UR STRIP-MCNC: NEGATIVE MG/DL
HAV IGG SER QL IA: NONREACTIVE
HBV CORE AB SERPL QL IA: NONREACTIVE
HBV SURFACE AB SERPL IA-ACNC: 0 M[IU]/ML
HBV SURFACE AG SERPL QL IA: NONREACTIVE
HCT VFR BLD AUTO: 31.2 % (ref 40–53)
HCV AB SERPL QL IA: NONREACTIVE
HGB BLD-MCNC: 9.7 G/DL (ref 13.3–17.7)
HGB UR QL STRIP: NEGATIVE
HIV 1+2 AB+HIV1 P24 AG SERPL QL IA: NONREACTIVE
HYALINE CASTS #/AREA URNS LPF: ABNORMAL /LPF
INR PPP: 3.53 (ref 0.86–1.14)
IRON SATN MFR SERPL: 10 % (ref 15–46)
IRON SERPL-MCNC: 29 UG/DL (ref 35–180)
KETONES UR STRIP-MCNC: NEGATIVE MG/DL
LDH SERPL L TO P-CCNC: 384 U/L (ref 85–227)
LEUKOCYTE ESTERASE UR QL STRIP: NEGATIVE
LOCATION PERFORMED: NORMAL
MCH RBC QN AUTO: 27.7 PG (ref 26.5–33)
MCHC RBC AUTO-ENTMCNC: 31.1 G/DL (ref 31.5–36.5)
MCV RBC AUTO: 89 FL (ref 78–100)
MDC_IDC_LEAD_IMPLANT_DT: NORMAL
MDC_IDC_LEAD_LOCATION: NORMAL
MDC_IDC_LEAD_LOCATION_DETAIL_1: NORMAL
MDC_IDC_LEAD_MFG: NORMAL
MDC_IDC_LEAD_MODEL: NORMAL
MDC_IDC_LEAD_POLARITY_TYPE: NORMAL
MDC_IDC_LEAD_SERIAL: NORMAL
MDC_IDC_MSMT_BATTERY_DTM: NORMAL
MDC_IDC_MSMT_BATTERY_REMAINING_LONGEVITY: 8 MO
MDC_IDC_MSMT_BATTERY_RRT_TRIGGER: 2.73
MDC_IDC_MSMT_BATTERY_STATUS: NORMAL
MDC_IDC_MSMT_BATTERY_VOLTAGE: 2.89 V
MDC_IDC_MSMT_CAP_CHARGE_DTM: NORMAL
MDC_IDC_MSMT_CAP_CHARGE_DTM: NORMAL
MDC_IDC_MSMT_CAP_CHARGE_ENERGY: 18 J
MDC_IDC_MSMT_CAP_CHARGE_ENERGY: 35 J
MDC_IDC_MSMT_CAP_CHARGE_TIME: 4.52
MDC_IDC_MSMT_CAP_CHARGE_TIME: 9.93
MDC_IDC_MSMT_CAP_CHARGE_TYPE: NORMAL
MDC_IDC_MSMT_CAP_CHARGE_TYPE: NORMAL
MDC_IDC_MSMT_LEADCHNL_LV_IMPEDANCE_VALUE: 380 OHM
MDC_IDC_MSMT_LEADCHNL_LV_IMPEDANCE_VALUE: 4047 OHM
MDC_IDC_MSMT_LEADCHNL_LV_IMPEDANCE_VALUE: 4047 OHM
MDC_IDC_MSMT_LEADCHNL_LV_PACING_THRESHOLD_AMPLITUDE: 0.62 V
MDC_IDC_MSMT_LEADCHNL_LV_PACING_THRESHOLD_PULSEWIDTH: 0.4 MS
MDC_IDC_MSMT_LEADCHNL_RA_IMPEDANCE_VALUE: 456 OHM
MDC_IDC_MSMT_LEADCHNL_RA_SENSING_INTR_AMPL: 1.88 MV
MDC_IDC_MSMT_LEADCHNL_RA_SENSING_INTR_AMPL: 1.88 MV
MDC_IDC_MSMT_LEADCHNL_RV_IMPEDANCE_VALUE: 247 OHM
MDC_IDC_MSMT_LEADCHNL_RV_IMPEDANCE_VALUE: 323 OHM
MDC_IDC_MSMT_LEADCHNL_RV_SENSING_INTR_AMPL: 5.88 MV
MDC_IDC_MSMT_LEADCHNL_RV_SENSING_INTR_AMPL: 5.88 MV
MDC_IDC_PG_IMPLANT_DTM: NORMAL
MDC_IDC_PG_MFG: NORMAL
MDC_IDC_PG_MODEL: NORMAL
MDC_IDC_PG_SERIAL: NORMAL
MDC_IDC_PG_TYPE: NORMAL
MDC_IDC_SESS_CLINIC_NAME: NORMAL
MDC_IDC_SESS_DTM: NORMAL
MDC_IDC_SESS_TYPE: NORMAL
MDC_IDC_SET_BRADY_LOWRATE: 50 {BEATS}/MIN
MDC_IDC_SET_BRADY_MAX_SENSOR_RATE: 130 {BEATS}/MIN
MDC_IDC_SET_BRADY_MODE: NORMAL
MDC_IDC_SET_CRT_PACED_CHAMBERS: NORMAL
MDC_IDC_SET_LEADCHNL_LV_PACING_ANODE_ELECTRODE_1: NORMAL
MDC_IDC_SET_LEADCHNL_LV_PACING_ANODE_LOCATION_1: NORMAL
MDC_IDC_SET_LEADCHNL_LV_PACING_CATHODE_ELECTRODE_1: NORMAL
MDC_IDC_SET_LEADCHNL_LV_PACING_CATHODE_LOCATION_1: NORMAL
MDC_IDC_SET_LEADCHNL_LV_PACING_POLARITY: NORMAL
MDC_IDC_SET_LEADCHNL_RA_PACING_AMPLITUDE: 3 V
MDC_IDC_SET_LEADCHNL_RA_PACING_ANODE_ELECTRODE_1: NORMAL
MDC_IDC_SET_LEADCHNL_RA_PACING_ANODE_LOCATION_1: NORMAL
MDC_IDC_SET_LEADCHNL_RA_PACING_CATHODE_ELECTRODE_1: NORMAL
MDC_IDC_SET_LEADCHNL_RA_PACING_CATHODE_LOCATION_1: NORMAL
MDC_IDC_SET_LEADCHNL_RA_PACING_POLARITY: NORMAL
MDC_IDC_SET_LEADCHNL_RA_PACING_PULSEWIDTH: 0.4 MS
MDC_IDC_SET_LEADCHNL_RA_SENSING_ANODE_ELECTRODE_1: NORMAL
MDC_IDC_SET_LEADCHNL_RA_SENSING_ANODE_LOCATION_1: NORMAL
MDC_IDC_SET_LEADCHNL_RA_SENSING_CATHODE_ELECTRODE_1: NORMAL
MDC_IDC_SET_LEADCHNL_RA_SENSING_CATHODE_LOCATION_1: NORMAL
MDC_IDC_SET_LEADCHNL_RA_SENSING_POLARITY: NORMAL
MDC_IDC_SET_LEADCHNL_RA_SENSING_SENSITIVITY: 0.45 MV
MDC_IDC_SET_LEADCHNL_RV_PACING_AMPLITUDE: 2.5 V
MDC_IDC_SET_LEADCHNL_RV_PACING_ANODE_ELECTRODE_1: NORMAL
MDC_IDC_SET_LEADCHNL_RV_PACING_ANODE_LOCATION_1: NORMAL
MDC_IDC_SET_LEADCHNL_RV_PACING_CATHODE_ELECTRODE_1: NORMAL
MDC_IDC_SET_LEADCHNL_RV_PACING_CATHODE_LOCATION_1: NORMAL
MDC_IDC_SET_LEADCHNL_RV_PACING_POLARITY: NORMAL
MDC_IDC_SET_LEADCHNL_RV_PACING_PULSEWIDTH: 0.4 MS
MDC_IDC_SET_LEADCHNL_RV_SENSING_ANODE_ELECTRODE_1: NORMAL
MDC_IDC_SET_LEADCHNL_RV_SENSING_ANODE_LOCATION_1: NORMAL
MDC_IDC_SET_LEADCHNL_RV_SENSING_CATHODE_ELECTRODE_1: NORMAL
MDC_IDC_SET_LEADCHNL_RV_SENSING_CATHODE_LOCATION_1: NORMAL
MDC_IDC_SET_LEADCHNL_RV_SENSING_POLARITY: NORMAL
MDC_IDC_SET_LEADCHNL_RV_SENSING_SENSITIVITY: 0.3 MV
MDC_IDC_SET_ZONE_DETECTION_BEATS_DENOMINATOR: 40 {BEATS}
MDC_IDC_SET_ZONE_DETECTION_BEATS_NUMERATOR: 30 {BEATS}
MDC_IDC_SET_ZONE_DETECTION_INTERVAL: 240 MS
MDC_IDC_SET_ZONE_DETECTION_INTERVAL: 270 MS
MDC_IDC_SET_ZONE_DETECTION_INTERVAL: 360 MS
MDC_IDC_SET_ZONE_DETECTION_INTERVAL: 400 MS
MDC_IDC_SET_ZONE_DETECTION_INTERVAL: 430 MS
MDC_IDC_SET_ZONE_TYPE: NORMAL
MDC_IDC_STAT_AT_BURDEN_PERCENT: 0.1 %
MDC_IDC_STAT_AT_DTM_END: NORMAL
MDC_IDC_STAT_AT_DTM_START: NORMAL
MDC_IDC_STAT_BRADY_AP_VP_PERCENT: 0 %
MDC_IDC_STAT_BRADY_AP_VS_PERCENT: 0.13 %
MDC_IDC_STAT_BRADY_AS_VP_PERCENT: 0 %
MDC_IDC_STAT_BRADY_AS_VS_PERCENT: 99.87 %
MDC_IDC_STAT_BRADY_DTM_END: NORMAL
MDC_IDC_STAT_BRADY_DTM_START: NORMAL
MDC_IDC_STAT_BRADY_RA_PERCENT_PACED: 0.18 %
MDC_IDC_STAT_BRADY_RV_PERCENT_PACED: 0 %
MDC_IDC_STAT_CRT_DTM_END: NORMAL
MDC_IDC_STAT_CRT_DTM_START: NORMAL
MDC_IDC_STAT_CRT_LV_PERCENT_PACED: 0 %
MDC_IDC_STAT_CRT_PERCENT_PACED: 0 %
MDC_IDC_STAT_EPISODE_RECENT_COUNT: 0
MDC_IDC_STAT_EPISODE_RECENT_COUNT: 1
MDC_IDC_STAT_EPISODE_RECENT_COUNT_DTM_END: NORMAL
MDC_IDC_STAT_EPISODE_RECENT_COUNT_DTM_START: NORMAL
MDC_IDC_STAT_EPISODE_TOTAL_COUNT: 0
MDC_IDC_STAT_EPISODE_TOTAL_COUNT: 0
MDC_IDC_STAT_EPISODE_TOTAL_COUNT: 189
MDC_IDC_STAT_EPISODE_TOTAL_COUNT: 19
MDC_IDC_STAT_EPISODE_TOTAL_COUNT: 19
MDC_IDC_STAT_EPISODE_TOTAL_COUNT: 2
MDC_IDC_STAT_EPISODE_TOTAL_COUNT: 292
MDC_IDC_STAT_EPISODE_TOTAL_COUNT_DTM_END: NORMAL
MDC_IDC_STAT_EPISODE_TOTAL_COUNT_DTM_START: NORMAL
MDC_IDC_STAT_EPISODE_TYPE: NORMAL
MDC_IDC_STAT_TACHYTHERAPY_ATP_DELIVERED_RECENT: 0
MDC_IDC_STAT_TACHYTHERAPY_ATP_DELIVERED_TOTAL: 14
MDC_IDC_STAT_TACHYTHERAPY_RECENT_DTM_END: NORMAL
MDC_IDC_STAT_TACHYTHERAPY_RECENT_DTM_START: NORMAL
MDC_IDC_STAT_TACHYTHERAPY_SHOCKS_ABORTED_RECENT: 0
MDC_IDC_STAT_TACHYTHERAPY_SHOCKS_ABORTED_TOTAL: 3
MDC_IDC_STAT_TACHYTHERAPY_SHOCKS_DELIVERED_RECENT: 0
MDC_IDC_STAT_TACHYTHERAPY_SHOCKS_DELIVERED_TOTAL: 4
MDC_IDC_STAT_TACHYTHERAPY_TOTAL_DTM_END: NORMAL
MDC_IDC_STAT_TACHYTHERAPY_TOTAL_DTM_START: NORMAL
MISCELLANEOUS TEST: NORMAL
NITRATE UR QL: NEGATIVE
NON-SQ EPI CELLS #/AREA URNS LPF: ABNORMAL /LPF
PH UR STRIP: 6.5 PH (ref 5–7)
PLATELET # BLD AUTO: 262 10E9/L (ref 150–450)
POTASSIUM SERPL-SCNC: 3.5 MMOL/L (ref 3.4–5.3)
PROT SERPL-MCNC: 7 G/DL (ref 6.8–8.8)
PSA SERPL-ACNC: 0.35 UG/L (ref 0–4)
RBC # BLD AUTO: 3.5 10E12/L (ref 4.4–5.9)
RBC #/AREA URNS AUTO: ABNORMAL /HPF
RESULT: NORMAL
SEND OUTS MISC TEST CODE: NORMAL
SEND OUTS MISC TEST SPECIMEN: NORMAL
SODIUM SERPL-SCNC: 138 MMOL/L (ref 133–144)
SOURCE: ABNORMAL
SP GR UR STRIP: 1.01 (ref 1–1.03)
TEST NAME: NORMAL
TIBC SERPL-MCNC: 294 UG/DL (ref 240–430)
UROBILINOGEN UR STRIP-MCNC: 2 MG/DL (ref 0–2)
WBC # BLD AUTO: 8.8 10E9/L (ref 4–11)
WBC #/AREA URNS AUTO: ABNORMAL /HPF

## 2020-09-10 PROCEDURE — 99000 SPECIMEN HANDLING OFFICE-LAB: CPT | Performed by: PHYSICIAN ASSISTANT

## 2020-09-10 PROCEDURE — 86780 TREPONEMA PALLIDUM: CPT | Performed by: PHYSICIAN ASSISTANT

## 2020-09-10 PROCEDURE — 83550 IRON BINDING TEST: CPT | Performed by: PHYSICIAN ASSISTANT

## 2020-09-10 PROCEDURE — 36415 COLL VENOUS BLD VENIPUNCTURE: CPT | Performed by: PHYSICIAN ASSISTANT

## 2020-09-10 PROCEDURE — 86704 HEP B CORE ANTIBODY TOTAL: CPT | Performed by: PHYSICIAN ASSISTANT

## 2020-09-10 PROCEDURE — 40000866 ZZHCL STATISTIC HIV 1/2 ANTIGEN/ANTIBODY PRETRANSPLANT ONLY: Performed by: PHYSICIAN ASSISTANT

## 2020-09-10 PROCEDURE — 86708 HEPATITIS A ANTIBODY: CPT | Performed by: PHYSICIAN ASSISTANT

## 2020-09-10 PROCEDURE — 40001009 ZZH VIDEO/TELEPHONE VISIT; NO CHARGE

## 2020-09-10 PROCEDURE — 86803 HEPATITIS C AB TEST: CPT | Performed by: PHYSICIAN ASSISTANT

## 2020-09-10 PROCEDURE — 86665 EPSTEIN-BARR CAPSID VCA: CPT | Performed by: PHYSICIAN ASSISTANT

## 2020-09-10 PROCEDURE — 93296 REM INTERROG EVL PM/IDS: CPT | Mod: 95,ZF | Performed by: PHYSICIAN ASSISTANT

## 2020-09-10 PROCEDURE — 86706 HEP B SURFACE ANTIBODY: CPT | Performed by: PHYSICIAN ASSISTANT

## 2020-09-10 PROCEDURE — 83540 ASSAY OF IRON: CPT | Performed by: PHYSICIAN ASSISTANT

## 2020-09-10 PROCEDURE — 99214 OFFICE O/P EST MOD 30 MIN: CPT | Mod: TEL | Performed by: PHYSICIAN ASSISTANT

## 2020-09-10 PROCEDURE — 86481 TB AG RESPONSE T-CELL SUSP: CPT | Performed by: PHYSICIAN ASSISTANT

## 2020-09-10 PROCEDURE — 80349 CANNABINOIDS NATURAL: CPT | Mod: 90 | Performed by: INTERNAL MEDICINE

## 2020-09-10 PROCEDURE — 87340 HEPATITIS B SURFACE AG IA: CPT | Performed by: PHYSICIAN ASSISTANT

## 2020-09-10 PROCEDURE — 86777 TOXOPLASMA ANTIBODY: CPT | Performed by: PHYSICIAN ASSISTANT

## 2020-09-10 PROCEDURE — 83615 LACTATE (LD) (LDH) ENZYME: CPT | Performed by: PHYSICIAN ASSISTANT

## 2020-09-10 PROCEDURE — 86696 HERPES SIMPLEX TYPE 2 TEST: CPT | Performed by: PHYSICIAN ASSISTANT

## 2020-09-10 PROCEDURE — 85027 COMPLETE CBC AUTOMATED: CPT | Performed by: PHYSICIAN ASSISTANT

## 2020-09-10 PROCEDURE — 80307 DRUG TEST PRSMV CHEM ANLYZR: CPT | Performed by: INTERNAL MEDICINE

## 2020-09-10 PROCEDURE — 85610 PROTHROMBIN TIME: CPT | Performed by: PHYSICIAN ASSISTANT

## 2020-09-10 PROCEDURE — 80053 COMPREHEN METABOLIC PANEL: CPT | Performed by: PHYSICIAN ASSISTANT

## 2020-09-10 PROCEDURE — G0103 PSA SCREENING: HCPCS | Performed by: PHYSICIAN ASSISTANT

## 2020-09-10 PROCEDURE — 80323 ALKALOIDS NOS: CPT | Mod: 90 | Performed by: PHYSICIAN ASSISTANT

## 2020-09-10 PROCEDURE — 86644 CMV ANTIBODY: CPT | Performed by: PHYSICIAN ASSISTANT

## 2020-09-10 PROCEDURE — 86695 HERPES SIMPLEX TYPE 1 TEST: CPT | Performed by: PHYSICIAN ASSISTANT

## 2020-09-10 PROCEDURE — 81001 URINALYSIS AUTO W/SCOPE: CPT | Mod: 59 | Performed by: INTERNAL MEDICINE

## 2020-09-10 PROCEDURE — 80321 ALCOHOLS BIOMARKERS 1OR 2: CPT | Mod: 90 | Performed by: INTERNAL MEDICINE

## 2020-09-10 PROCEDURE — 86787 VARICELLA-ZOSTER ANTIBODY: CPT | Performed by: PHYSICIAN ASSISTANT

## 2020-09-10 NOTE — PROGRESS NOTES
Switched to telephone after attempting to connect on [place] for 12 minutes  Start time 12:08 PM  End time 12:34 PM    HPI:   Jim Willingham is a 64 year old male with a past medical history of NICM (EF 20%) s/p HM II LVAD placement (6/19/17) at [facility] (obesity) Other relavant history includes a. Fib, chronic kidney disease stage III, diabetes mellitus type 2, RV failure, CECILIA, obesity, hypertension, COPD, asthma. He is here for heart failure and LVAD follow up.     He was recently admitted from 8/5/2020 to 8/7/2020 for WALTER d/t hypovolemia. He was discharged off of bumex with plans to resume bumex 1 mg BID once his weight was higher than 237. (was admitted at 228). Hi aldactone was stopped indefinitely. His Cr on discharge was 1.83. Stopped ACE/ARB    This visit  He is appointment today is because he has had some increased shortness of breath.  He does not feel short of breath at rest but over the last few days he has had increasing dyspnea on exertion.  Today just walking into lab made him feel quite short of breath and was significantly different than his baseline.  He has had some very mild weight gain, weight when he left the hospital has been stable between 226 pounds to 227 pounds.  When he got a right heart cath which was quite hypovolemic he was 228 pounds.  Today he is 230 pounds.  He has very mild lower extremity edema, but is been much worse.  No abdominal edema.  No orthopnea or PND.  He is wearing his CPAP as instructed.  Headedness or dizziness or sudden fatigue with exertion as he had in the past.  No chest pain.  No palpitations.    He denies bleeding symptoms including blood in the urine, blood in stool, prolonged nosebleeds.    No driveline redness, drainage, pain.  No fevers or chills.    He has some mild headaches which he attributes to stress, not new, mostly happening at night.  Get better sleep or Tylenol.  No stroke symptoms.    No LVAD alarms    Cardiac Medications  ASA- holding with high INR but  will resume once back in range  Coumadin  Bumex 0.5/1.0- increased last week  Amiodarone 400 mg once a day  Hydralazine 30 TID  KCl 10 daily    PAST MEDICAL HISTORY:  Past Medical History:   Diagnosis Date     Benign essential hypertension 2017     Cardiomyopathy, unspecified (H) 2017     CKD (chronic kidney disease) stage 3, GFR 30-59 ml/min (H) 2017     Depression 2017     Diabetes mellitus (H) 2017     H/O gastric bypass 2017     ICD (implantable cardioverter-defibrillator), biventricular, in situ 2017     LVAD (left ventricular assist device) present (H)      NICM (nonischemic cardiomyopathy) (H)/ EF 20% 2017    ECHO: LVEDd. 7.66 cm, Restrictive pattern , Severe mitral valve regurgitation     CECILIA (obstructive sleep apnea) 2017     Paroxysmal atrial fibrillation (H) 2017     Paroxysmal VT (H) 2017     Uncomplicated asthma      Vitamin B12 deficiency (non anemic) 2017       FAMILY HISTORY:  Family History   Problem Relation Age of Onset     Cerebrovascular Disease Mother      Diabetes Mother      Hypertension Mother      Coronary Artery Disease Father      Diabetes Type 2  Father        SOCIAL HISTORY:  Social History     Socioeconomic History     Marital status:      Spouse name: Not on file     Number of children: Not on file     Years of education: Not on file     Highest education level: Not on file   Occupational History     Not on file   Social Needs     Financial resource strain: Not on file     Food insecurity:     Worry: Not on file     Inability: Not on file     Transportation needs:     Medical: Not on file     Non-medical: Not on file   Tobacco Use     Smoking status: Former Smoker     Last attempt to quit:      Years since quittin.1     Smokeless tobacco: Never Used   Substance and Sexual Activity     Alcohol use: No     Drug use: No     Sexual activity: Yes     Partners: Female   Lifestyle     Physical activity:     Days per  week: Not on file     Minutes per session: Not on file     Stress: Not on file   Relationships     Social connections:     Talks on phone: Not on file     Gets together: Not on file     Attends Voodoo service: Not on file     Active member of club or organization: Not on file     Attends meetings of clubs or organizations: Not on file     Relationship status: Not on file     Intimate partner violence:     Fear of current or ex partner: Not on file     Emotionally abused: Not on file     Physically abused: Not on file     Forced sexual activity: Not on file   Other Topics Concern     Parent/sibling w/ CABG, MI or angioplasty before 65F 55M? No   Social History Narrative     Not on file       CURRENT MEDICATIONS:  acetaminophen (TYLENOL) 325 MG tablet, Take 650 mg by mouth every 6 hours as needed for mild pain  albuterol (ALBUTEROL) 108 (90 BASE) MCG/ACT Inhaler, Inhale 2 puffs into the lungs every 4 hours as needed for shortness of breath / dyspnea or wheezing  allopurinol (ZYLOPRIM) 100 MG tablet, Take 1 tablet (100 mg) by mouth daily  amiodarone (PACERONE) 200 MG tablet, Take 400 mg by mouth daily  aspirin 81 MG chewable tablet, 1 tablet (81 mg) by Oral or Feeding Tube route daily  Benzocaine-Menthol (CEPACOL) 15-2.3 MG LOZG, Take 1 lozenge by mouth every hour as needed (sore throat)  blood glucose monitoring (ONE TOUCH ULTRA 2) meter device kit, Use to test blood sugar four times daily or as directed.  blood glucose VI test strip, Use to test blood sugar four times daily or as directed.  bumetanide (BUMEX) 0.5 MG tablet, Please take twice per day: 1mg in the morning and 0.5mg in the evening  citalopram (CELEXA) 20 MG tablet, Take 20 mg by mouth At Bedtime   cyanocobalamin (VITAMIN B12) 1000 MCG/ML injection, Inject 1,000 mcg into the muscle every 30 days  fluticasone-vilanterol (BREO ELLIPTA) 200-25 MCG/INH oral inhaler, Inhale 1 puff into the lungs daily  gabapentin (NEURONTIN) 300 MG capsule, Take 1 capsule  (300 mg) by mouth twice daily and 2 capsules (600 mg) by mouth at bedtime daily.  hydrALAZINE (APRESOLINE) 10 MG tablet, Take 3 tablets (30 mg) by mouth 3 times daily  insulin glargine (LANTUS SOLOSTAR) 100 UNIT/ML pen, Inject 6 Units Subcutaneous At Bedtime  insulin lispro (HUMALOG KWIKPEN) 100 UNIT/ML (1 unit dial) KWIKPEN, Inject 1-7 Units Subcutaneous 4 times daily  insulin pen needle (32G X 4 MM) 32G X 4 MM miscellaneous, Use four pen needles daily or as directed.  montelukast (SINGULAIR) 10 MG tablet, Take 10 mg by mouth At Bedtime  pantoprazole (PROTONIX) 40 MG EC tablet, Take 1 tablet (40 mg) by mouth every morning  potassium chloride ER (KLOR-CON M) 10 MEQ CR tablet, Take 1 tablet (10 mEq) by mouth daily  predniSONE (DELTASONE) 20 MG tablet, Take 1 tablet (20 mg) by mouth daily  traMADol (ULTRAM) 50 MG tablet, Take 2 tablets (100 mg) by mouth every 6 hours as needed for moderate pain or breakthrough pain  umeclidinium (INCRUSE ELLIPTA) 62.5 MCG/INH oral inhaler, Inhale 1 puff into the lungs daily  warfarin ANTICOAGULANT (COUMADIN ANTICOAGULANT) 5 MG tablet, 7.5 mg tonight, 5 mg on Saturday, 7.5 mg on Sunday. Repeat INR Monday.  zinc sulfate (ZINCATE) 220 (50 Zn) MG capsule, Take 220 mg by mouth 2 times daily    No current facility-administered medications on file prior to visit.       ROS:   See HPI    EXAM:  There were no vitals taken for this visit.    N/A phone visit      Labs - as reviewed in clinic with patient today:  CBC RESULTS:  Lab Results   Component Value Date    WBC 5.8 08/24/2020    RBC 3.56 (L) 08/24/2020    HGB 10.3 (L) 08/24/2020    HCT 33.7 (L) 08/24/2020    MCV 95 08/24/2020    MCH 28.9 08/24/2020    MCHC 30.6 (L) 08/24/2020    RDW 16.6 (H) 08/24/2020     08/24/2020       CMP RESULTS:  Lab Results   Component Value Date     08/24/2020    POTASSIUM 3.8 08/24/2020    CHLORIDE 107 08/24/2020    CO2 25 08/24/2020    ANIONGAP 7 08/24/2020    GLC 76 08/24/2020    BUN 25 08/24/2020     CR 1.25 08/24/2020    GFRESTIMATED 61 08/24/2020    GFRESTBLACK 70 08/24/2020    QAMAR 8.5 08/24/2020    BILITOTAL 0.8 08/10/2020    ALBUMIN 3.7 08/10/2020    ALKPHOS 69 08/10/2020    ALT 39 08/10/2020    AST 39 08/10/2020        INR RESULTS:  Lab Results   Component Value Date    INR 3.71 (H) 08/31/2020       Lab Results   Component Value Date    MAG 2.5 (H) 08/07/2020     Lab Results   Component Value Date    NTBNPI 1,162 (H) 08/05/2020     Lab Results   Component Value Date    NTBNP 866 (H) 07/31/2019           Diagnostics  8/5/20 RHC   Time  Systolic  Diastolic  Mean  A Wave  V Wave  EDP  Max dp/dt  HR    RA Pressures   3:41 PM    6 mmHg     7 mmHg     7 mmHg       107 bpm       RV Pressures   3:42 PM  24 mmHg         6 mmHg      153 bpm       PA Pressures   3:43 PM  23 mmHg     11 mmHg     15 mmHg         156 bpm       PCW Pressures   3:42 PM    4 mmHg     4 mmHg     5 mmHg       153 bpm         Cardiac Output Results   3:32 PM  4.03 L/min     1.83 L/min/m2     4.05 L/min     1.84 L/min/m2          3:43 PM  4.03 L/min                 7/15/2020 ICD check  In clinic device appointment conversion to remote device appointment to minimize COVID19 exposure for high risk patient populations.  Scheduled remote ICD transmission received and reviewed. Device transmission sent per MD orders. Patient has a [employer] multi lead ICD programmed AAIR. Normal ICD function. No episodes recorded. No arrhythmias recorded. Presenting EGM = SR @ 70 bpm. AP = 0%.  = 0%. BVP = 0%. OptiVol fluid index is at baseline. Estimated battery longevity to MARVA = 6 months. Battery voltage = 2.88V. No short v-v intervals recorded. Lead impedance trends appear stable. Patient notified of interrogation results. Patient reports that he is feeling well and denies complaints. Plan for patient to send a remote transmission in 3 months as scheduled.  MIKY Feliz RN     Remote ICD transmission    8/5/20 ECHO     Interpretation Summary  LVAD cannula was  seen in the expected anatomic position in the LV apex.  HM2 at 9400RPM.  LVIDd 61mm.  Septum normal.  Aortic valve remain closed.  Normal flow velocities.  Global right ventricular function is mildly to moderately reduced.  The inferior vena cava is normal.  No pericardial effusion is present.    1/20/2020 CO  Interpretation Summary  Technically difficult study. Patients heart rate is in the 120s-150s during  the study.  HM2 LVAD is present and is funcioning at 9400RPM     Severely (EF 10-15%) reduced left ventricular function is present. Severe left  ventricular dilation is present. LVIDd is 6.6cm LVAD cannula in the apex is  not well visualized. Inflow and outflow velocities could not be obtained.  Aortic valve appears to open minimally with each beat. Septum is probably  midline but again difficult to visualize.  Global right ventricular function is moderately to severely reduced.  IVC diameter >2.1 cm collapsing <50% with sniff suggests a high RA pressure  estimated at 15 mmHg or greater. No pericardial effusion is present.     Compared to prior study on 1/16/20, LVIDd appears slightly larger. Patient is  severely tachycardic now. IVC appears more dilated.    7/24/2019 RHC   Time  Systolic  Diastolic  Mean  A Wave  V Wave  EDP  Max dp/dt  HR    RA Pressures  10:04 AM    10 mmHg     28774 mmHg     00706 mmHg       55 bpm       RV Pressures   9:42 AM        384 mmHg/sec         10:01 AM  25 mmHg         10 mmHg      39 bpm       PA Pressures  10:01 AM  25 mmHg     16 mmHg     19 mmHg         80 bpm       PCW Pressures  10:02 AM    11 mmHg     14067 mmHg     99271 mmHg       68 bpm          Time  TDCO  TDCI  Kee C.O.  Kee C.I.  Kee HR    Cardiac Output Results   9:42 AM  4.63 L/min     2.02 L/min/m2     4.62 L/min     2.01 L/min/m2            _____________________________________________________________________________    Assessment and Plan:   Jim Willingham is a 65 year old male with a past medical history of  NICM (EF 20%) s/p HM II LVAD placement (6/19/17) at [facility] ([facility]) Other relavant history includes chronic kidney disease stage III, diabetes mellitus type 2, RV failure, CECILIA, obesity, hypertension, COPD, asthma. He presents for LVAD follow-up with complaints of RESENDIZ.    Recent hospitalization as above.    Today he complains of worsening dyspnea on exertion.  Difficult to understand his volume status given this was a phone visit however I think it is much more likely that he is hypovolemic.  He had a very hypovolemic catheterization around 228 pounds he is now 230 pounds.  He has been dieting so possible that he is lost some weight and now has a lower dry weight, but without seeing them its very difficult to rely on this.  His BUN to creatinine ratio is above 20-1.  LDH is stable.  He does have a downtrending hemoglobin but no bleeding symptoms.  We will recheck his hemoglobin next week.  We will also arrange for IV iron as he is iron deficient again.  We will have him sign a remote ICD check as he has had similar symptoms with A. fib in the past.  If he is not having A. fib I would recommend that he actually decrease his Bumex to 0.5 twice daily from 0.5 in the morning and 1 mg in the afternoon.  He may need to come in for an in person appointment to get a better feel for his volume exam.        #  Chronic systolic heart failure secondary to NICM  Stage D  NYHA Class IIIA  ACEi/ARB: Losartan stopped d/t fluctuating renal function, continue hydralazine.  BB: Stopped during recent admission  Aldosterone antagonist: Stopped d/t hyperkalemia.  SCD prophylaxis: ICD  Fluid status:  Slightly hypovolemic by history- will continue current medications for now, but decreased bumex if we do not fine an alternative explaination for hie RESENDIZ on ICD check.     #  S/P LVAD implant as [facility] due to obesity. Goal is to loose weight to be eligible for transplant.  Anticoagulation: Warfarin with INR goal of 2-3,  dosing per coumadin  clinic  Antiplatelet: Aspirin 81 mg daily- okay to hold while supratheraputic inr.  MAP: Goal 65-85 no reading today given virtual visit  LDH: Stable, 384, BL around 380-450  Other: Will identify a new person to train on drivelien dressings given his current problems with his wife  VAD Interrogation today:  NA a telephone visit.No audible alarms per patient    # RV failure  - Last RHC wedge was 4, RA was 6.  - Now off metoprolol, continue amidoarone  - Working towards weight loss so that he can be evaluated/listed for transplant     # Obesity:  Recommend weight loss with a goal weight of 255 which he has now met.     # A. Fib  New in Jan. Now S/p Cardioversion in 5/2020. Symptoms improved dramatically. In sinus on last ICD check. No a. Fib episodes since cardioversion  - Continue amiodarone and coumadin  - Recheck ICD given recurrent RESENDIZ     # CECILIA: Continue CPAP.       # Chronic kidney disease with WALTER: Recent WALTER with peak of 2.40, continues to improve. 1.48 today, still above his baseline. BL in the past year has been 1.2-1.4  - Bumex decrease as above  - Recheck in 2 weeks    # Iron deficiency anemia  - Iron saturation down to 10%  - Will arrange for IV iron infusions: Venofer 300 x3, dose weekly  - Recheck Hgb next week, call for bleeding symptoms    Follow-up  - ICD check today  - Please get bloodwork done when you are here on Tuesday (downtrending Hgb, possible bumex change)  - 1 month with Melisa- need to schedule. If you are feeling well, can be virtual, if you are s till having these symptoms will need to bring you into clinic  - Dr. Montgomery in December as scheduled    MICHEL Yeboah, [facility]  Answers for HPI/ROS submitted by the patient on 9/9/2020   General Symptoms: No  Skin Symptoms: No  HENT Symptoms: No  EYE SYMPTOMS: No  HEART SYMPTOMS: No  LUNG SYMPTOMS: No  INTESTINAL SYMPTOMS: No  URINARY SYMPTOMS: No  REPRODUCTIVE SYMPTOMS: No  SKELETAL SYMPTOMS: No  BLOOD  SYMPTOMS: No  NERVOUS SYSTEM SYMPTOMS: No  MENTAL HEALTH SYMPTOMS: No

## 2020-09-10 NOTE — PROGRESS NOTES
ANTICOAGULATION FOLLOW-UP CLINIC VISIT    Patient Name:  Jim Willingham  Date:  9/10/2020  Contact Type:  Telephone    SUBJECTIVE:  Patient Findings     Comments:   Patient reports that 5mg MWF and 7.5mg all other days. Patient reports starting on long acting insulin.         Clinical Outcomes     Comments:   Patient reports that 5mg MWF and 7.5mg all other days. Patient reports starting on long acting insulin.            OBJECTIVE    Recent labs: (last 7 days)     09/10/20  0830   INR 3.53*       ASSESSMENT / PLAN  No question data found.  Anticoagulation Summary  As of 9/10/2020    INR goal:   2.0-3.0   TTR:   51.1 % (11.3 mo)   INR used for dosing:   3.53! (9/10/2020)   Warfarin maintenance plan:   7.5 mg (5 mg x 1.5) every Tue, Thu, Sat; 5 mg (5 mg x 1) all other days   Full warfarin instructions:   7.5 mg every Tue, Thu, Sat; 5 mg all other days   Weekly warfarin total:   42.5 mg   Plan last modified:   Maru Alonso RN (9/10/2020)   Next INR check:   9/17/2020   Priority:   Critical   Target end date:   Indefinite    Indications    LVAD (left ventricular assist device) present (H) [Z95.811]  Long-term (current) use of anticoagulants [Z79.01] [Z79.01]  Chronic systolic congestive heart failure (H) [I50.22]             Anticoagulation Episode Summary     INR check location:       Preferred lab:       Send INR reminders to:    [facility]    Comments:   +++Patient drawn at Home care visit Q Mon. (8/10/20)+++  Labs drawn either at [facility] or [facility], See 3/5/20 ADDENDUM, pt. weekly INR >2.0 for Cardioversion  LVAD placed 6/19/17  HM II  ASA 81 mg daily      Anticoagulation Care Providers     Provider Role Specialty Phone number    Delisa Montgomery MD Referring Cardiology [phone]            See the Encounter Report to view Anticoagulation Flowsheet and Dosing Calendar (Go to Encounters tab in chart review, and find the Anticoagulation Therapy Visit)    Spoke with patient.      Maru Alonso, RN

## 2020-09-10 NOTE — PROGRESS NOTES
"Jim Willingham is a 65 year old male who is being evaluated via a billable video visit.      The patient has been notified of following:     \"This video visit will be conducted via a call between you and your physician/provider. We have found that certain health care needs can be provided without the need for an in-person physical exam.  This service lets us provide the care you need with a video conversation.  If a prescription is necessary we can send it directly to your pharmacy.  If lab work is needed we can place an order for that and you can then stop by our lab to have the test done at a later time.    If during the course of the call the physician/provider feels a video visit is not appropriate, you will not be charged for this service.\"    Patient has given verbal consent for Video visit? Yes  How would you like to obtain your AVS? Mail a copy  If you are dropped from the video visit, the video invite should be resent to:   Will anyone else be joining your video visit? No          "

## 2020-09-10 NOTE — PATIENT INSTRUCTIONS
Medication chances  - Continue your current medications for now  - If your ICD check comes back normal we will reduce your bumex  - If your symptoms continue, we may need to bring you into clinic so that we can do a physical exam    Instructions  - Please call for further weight gain  - Please let us know if you have dark tarry stools or blood in your stool or urine  - I will reach out to Bre and see if you need to get the pumonary function tests    Follow-up  - Please get bloodwork done when you are here on Tuesday.  - 1 month with Melisa- need to schedule. If you are feeling well, can be virtual, if you are s till having these symptoms will need to bring you into clinic  - Dr. Montgomery in December as scheduled

## 2020-09-10 NOTE — LETTER
September 10, 2020      Jim W Willingham  [address] [facility] [place] [address]        Dear ,    We are writing to inform you of your test results.    {results letter list:[id number]}    Resulted Orders   Comprehensive metabolic panel   Result Value Ref Range    Sodium 138 133 - 144 mmol/L    Potassium 3.5 3.4 - 5.3 mmol/L    Chloride 103 94 - 109 mmol/L    Carbon Dioxide 28 20 - 32 mmol/L    Anion Gap 7 3 - 14 mmol/L    Glucose 180 (H) 70 - 99 mg/dL    Urea Nitrogen 30 7 - 30 mg/dL    Creatinine 1.48 (H) 0.66 - 1.25 mg/dL    GFR Estimate 50 (L) >60 mL/min/[1.73_m2]      Comment:      Non  GFR Calc  Starting 12/18/2018, serum creatinine based estimated GFR (eGFR) will be   calculated using the Chronic Kidney Disease Epidemiology Collaboration   (CKD-EPI) equation.      GFR Estimate If Black 57 (L) >60 mL/min/[1.73_m2]      Comment:       GFR Calc  Starting 12/18/2018, serum creatinine based estimated GFR (eGFR) will be   calculated using the Chronic Kidney Disease Epidemiology Collaboration   (CKD-EPI) equation.      Calcium 8.4 (L) 8.5 - 10.1 mg/dL    Bilirubin Total 0.8 0.2 - 1.3 mg/dL    Albumin 3.8 3.4 - 5.0 g/dL    Protein Total 7.0 6.8 - 8.8 g/dL    Alkaline Phosphatase 94 40 - 150 U/L    ALT 48 0 - 70 U/L    AST 42 0 - 45 U/L   CBC with platelets   Result Value Ref Range    WBC 8.8 4.0 - 11.0 10e9/L    RBC Count 3.50 (L) 4.4 - 5.9 10e12/L    Hemoglobin 9.7 (L) 13.3 - 17.7 g/dL    Hematocrit 31.2 (L) 40.0 - 53.0 %    MCV 89 78 - 100 fl    MCH 27.7 26.5 - 33.0 pg    MCHC 31.1 (L) 31.5 - 36.5 g/dL    RDW 15.9 (H) 10.0 - 15.0 %    Platelet Count 262 150 - 450 10e9/L   INR   Result Value Ref Range    INR 3.53 (H) 0.86 - 1.14   Lactate Dehydrogenase   Result Value Ref Range    Lactate Dehydrogenase 384 (H) 85 - 227 U/L       If you have any questions or concerns, please call the clinic at the number listed above.       Sincerely,        First Floor Lab

## 2020-09-10 NOTE — LETTER
9/10/2020      RE: Jim Willingham  [address] [facility] [address]       Dear Colleague,    Thank you for the opportunity to participate in the care of your patient, Jim Willingham, at the [facility] HEART [facility] Please see a copy of my visit note below.    Switched to telephone after attempting to connect on [place] for 12 minutes  Start time 12:08 PM  End time 12:34 PM    HPI:   Jim Willingham is a 64 year old male with a past medical history of NICM (EF 20%) s/p HM II LVAD placement (6/19/17) at  (obesity) Other relavant history includes a. Fib, chronic kidney disease stage III, diabetes mellitus type 2, RV failure, CECILIA, obesity, hypertension, COPD, asthma. He is here for heart failure and LVAD follow up.     He was recently admitted from 8/5/2020 to 8/7/2020 for WALTER d/t hypovolemia. He was discharged off of bumex with plans to resume bumex 1 mg BID once his weight was higher than 237. (was admitted at 228). Hi aldactone was stopped indefinitely. His Cr on discharge was 1.83. Stopped ACE/ARB    This visit  He is appointment today is because he has had some increased shortness of breath.  He does not feel short of breath at rest but over the last few days he has had increasing dyspnea on exertion.  Today just walking into lab made him feel quite short of breath and was significantly different than his baseline.  He has had some very mild weight gain, weight when he left the hospital has been stable between 226 pounds to 227 pounds.  When he got a right heart cath which was quite hypovolemic he was 228 pounds.  Today he is 230 pounds.  He has very mild lower extremity edema, but is been much worse.  No abdominal edema.  No orthopnea or PND.  He is wearing his CPAP as instructed.  Headedness or dizziness or sudden fatigue with exertion as he had in the past.  No chest pain.  No palpitations.    He denies bleeding symptoms including blood in the urine, blood in  stool, prolonged nosebleeds.    No driveline redness, drainage, pain.  No fevers or chills.    He has some mild headaches which he attributes to stress, not new, mostly happening at night.  Get better sleep or Tylenol.  No stroke symptoms.    No LVAD alarms    Cardiac Medications  ASA- holding with high INR but will resume once back in range  Coumadin  Bumex 0.5/1.0- increased last week  Amiodarone 400 mg once a day  Hydralazine 30 TID  KCl 10 daily    PAST MEDICAL HISTORY:  Past Medical History:   Diagnosis Date     Benign essential hypertension 5/11/2017     Cardiomyopathy, unspecified (H) 5/8/2017     CKD (chronic kidney disease) stage 3, GFR 30-59 ml/min (H) 5/11/2017     Depression 5/11/2017     Diabetes mellitus (H) 5/11/2017     H/O gastric bypass 5/11/2017     ICD (implantable cardioverter-defibrillator), biventricular, in situ 5/11/2017     LVAD (left ventricular assist device) present (H)      NICM (nonischemic cardiomyopathy) (H)/ EF 20% 5/11/2017    ECHO: LVEDd. 7.66 cm, Restrictive pattern , Severe mitral valve regurgitation     CECILIA (obstructive sleep apnea) 5/11/2017     Paroxysmal atrial fibrillation (H) 5/11/2017     Paroxysmal VT (H) 5/11/2017     Uncomplicated asthma      Vitamin B12 deficiency (non anemic) 5/11/2017       FAMILY HISTORY:  Family History   Problem Relation Age of Onset     Cerebrovascular Disease Mother      Diabetes Mother      Hypertension Mother      Coronary Artery Disease Father      Diabetes Type 2  Father        SOCIAL HISTORY:  Social History     Socioeconomic History     Marital status:      Spouse name: Not on file     Number of children: Not on file     Years of education: Not on file     Highest education level: Not on file   Occupational History     Not on file   Social Needs     Financial resource strain: Not on file     Food insecurity:     Worry: Not on file     Inability: Not on file     Transportation needs:     Medical: Not on file     Non-medical: Not on  file   Tobacco Use     Smoking status: Former Smoker     Last attempt to quit:      Years since quittin.1     Smokeless tobacco: Never Used   Substance and Sexual Activity     Alcohol use: No     Drug use: No     Sexual activity: Yes     Partners: Female   Lifestyle     Physical activity:     Days per week: Not on file     Minutes per session: Not on file     Stress: Not on file   Relationships     Social connections:     Talks on phone: Not on file     Gets together: Not on file     Attends Hinduism service: Not on file     Active member of club or organization: Not on file     Attends meetings of clubs or organizations: Not on file     Relationship status: Not on file     Intimate partner violence:     Fear of current or ex partner: Not on file     Emotionally abused: Not on file     Physically abused: Not on file     Forced sexual activity: Not on file   Other Topics Concern     Parent/sibling w/ CABG, MI or angioplasty before 65F 55M? No   Social History Narrative     Not on file       CURRENT MEDICATIONS:  acetaminophen (TYLENOL) 325 MG tablet, Take 650 mg by mouth every 6 hours as needed for mild pain  albuterol (ALBUTEROL) 108 (90 BASE) MCG/ACT Inhaler, Inhale 2 puffs into the lungs every 4 hours as needed for shortness of breath / dyspnea or wheezing  allopurinol (ZYLOPRIM) 100 MG tablet, Take 1 tablet (100 mg) by mouth daily  amiodarone (PACERONE) 200 MG tablet, Take 400 mg by mouth daily  aspirin 81 MG chewable tablet, 1 tablet (81 mg) by Oral or Feeding Tube route daily  Benzocaine-Menthol (CEPACOL) 15-2.3 MG LOZG, Take 1 lozenge by mouth every hour as needed (sore throat)  blood glucose monitoring (ONE TOUCH ULTRA 2) meter device kit, Use to test blood sugar four times daily or as directed.  blood glucose VI test strip, Use to test blood sugar four times daily or as directed.  bumetanide (BUMEX) 0.5 MG tablet, Please take twice per day: 1mg in the morning and 0.5mg in the evening  citalopram  (CELEXA) 20 MG tablet, Take 20 mg by mouth At Bedtime   cyanocobalamin (VITAMIN B12) 1000 MCG/ML injection, Inject 1,000 mcg into the muscle every 30 days  fluticasone-vilanterol (BREO ELLIPTA) 200-25 MCG/INH oral inhaler, Inhale 1 puff into the lungs daily  gabapentin (NEURONTIN) 300 MG capsule, Take 1 capsule (300 mg) by mouth twice daily and 2 capsules (600 mg) by mouth at bedtime daily.  hydrALAZINE (APRESOLINE) 10 MG tablet, Take 3 tablets (30 mg) by mouth 3 times daily  insulin glargine (LANTUS SOLOSTAR) 100 UNIT/ML pen, Inject 6 Units Subcutaneous At Bedtime  insulin lispro (HUMALOG KWIKPEN) 100 UNIT/ML (1 unit dial) KWIKPEN, Inject 1-7 Units Subcutaneous 4 times daily  insulin pen needle (32G X 4 MM) 32G X 4 MM miscellaneous, Use four pen needles daily or as directed.  montelukast (SINGULAIR) 10 MG tablet, Take 10 mg by mouth At Bedtime  pantoprazole (PROTONIX) 40 MG EC tablet, Take 1 tablet (40 mg) by mouth every morning  potassium chloride ER (KLOR-CON M) 10 MEQ CR tablet, Take 1 tablet (10 mEq) by mouth daily  predniSONE (DELTASONE) 20 MG tablet, Take 1 tablet (20 mg) by mouth daily  traMADol (ULTRAM) 50 MG tablet, Take 2 tablets (100 mg) by mouth every 6 hours as needed for moderate pain or breakthrough pain  umeclidinium (INCRUSE ELLIPTA) 62.5 MCG/INH oral inhaler, Inhale 1 puff into the lungs daily  warfarin ANTICOAGULANT (COUMADIN ANTICOAGULANT) 5 MG tablet, 7.5 mg tonight, 5 mg on Saturday, 7.5 mg on Sunday. Repeat INR Monday.  zinc sulfate (ZINCATE) 220 (50 Zn) MG capsule, Take 220 mg by mouth 2 times daily    No current facility-administered medications on file prior to visit.       ROS:   See HPI    EXAM:  There were no vitals taken for this visit.    N/A phone visit      Labs - as reviewed in clinic with patient today:  CBC RESULTS:  Lab Results   Component Value Date    WBC 5.8 08/24/2020    RBC 3.56 (L) 08/24/2020    HGB 10.3 (L) 08/24/2020    HCT 33.7 (L) 08/24/2020    MCV 95 08/24/2020    MCH  28.9 08/24/2020    MCHC 30.6 (L) 08/24/2020    RDW 16.6 (H) 08/24/2020     08/24/2020       CMP RESULTS:  Lab Results   Component Value Date     08/24/2020    POTASSIUM 3.8 08/24/2020    CHLORIDE 107 08/24/2020    CO2 25 08/24/2020    ANIONGAP 7 08/24/2020    GLC 76 08/24/2020    BUN 25 08/24/2020    CR 1.25 08/24/2020    GFRESTIMATED 61 08/24/2020    GFRESTBLACK 70 08/24/2020    QAMAR 8.5 08/24/2020    BILITOTAL 0.8 08/10/2020    ALBUMIN 3.7 08/10/2020    ALKPHOS 69 08/10/2020    ALT 39 08/10/2020    AST 39 08/10/2020        INR RESULTS:  Lab Results   Component Value Date    INR 3.71 (H) 08/31/2020       Lab Results   Component Value Date    MAG 2.5 (H) 08/07/2020     Lab Results   Component Value Date    NTBNPI 1,162 (H) 08/05/2020     Lab Results   Component Value Date    NTBNP 866 (H) 07/31/2019           Diagnostics  8/5/20 RHC   Time  Systolic  Diastolic  Mean  A Wave  V Wave  EDP  Max dp/dt  HR    RA Pressures   3:41 PM    6 mmHg     7 mmHg     7 mmHg       107 bpm       RV Pressures   3:42 PM  24 mmHg         6 mmHg      153 bpm       PA Pressures   3:43 PM  23 mmHg     11 mmHg     15 mmHg         156 bpm       PCW Pressures   3:42 PM    4 mmHg     4 mmHg     5 mmHg       153 bpm         Cardiac Output Results   3:32 PM  4.03 L/min     1.83 L/min/m2     4.05 L/min     1.84 L/min/m2          3:43 PM  4.03 L/min                 7/15/2020 ICD check  In clinic device appointment conversion to remote device appointment to minimize COVID19 exposure for high risk patient populations.  Scheduled remote ICD transmission received and reviewed. Device transmission sent per MD orders. Patient has a Medtronic multi lead ICD programmed AAIR. Normal ICD function. No episodes recorded. No arrhythmias recorded. Presenting EGM = SR @ 70 bpm. AP = 0%.  = 0%. BVP = 0%. OptiVol fluid index is at baseline. Estimated battery longevity to MARVA = 6 months. Battery voltage = 2.88V. No short v-v intervals recorded. Lead  impedance trends appear stable. Patient notified of interrogation results. Patient reports that he is feeling well and denies complaints. Plan for patient to send a remote transmission in 3 months as scheduled.  MIKY Feliz RN     Remote ICD transmission    8/5/20 ECHO     Interpretation Summary  LVAD cannula was seen in the expected anatomic position in the LV apex.  HM2 at 9400RPM.  LVIDd 61mm.  Septum normal.  Aortic valve remain closed.  Normal flow velocities.  Global right ventricular function is mildly to moderately reduced.  The inferior vena cava is normal.  No pericardial effusion is present.    1/20/2020 [facility] [place]  Interpretation Summary  Technically difficult study. Patients heart rate is in the 120s-150s during  the study.  HM2 LVAD is present and is funcioning at 9400RPM     Severely (EF 10-15%) reduced left ventricular function is present. Severe left  ventricular dilation is present. LVIDd is 6.6cm LVAD cannula in the apex is  not well visualized. Inflow and outflow velocities could not be obtained.  Aortic valve appears to open minimally with each beat. Septum is probably  midline but again difficult to visualize.  Global right ventricular function is moderately to severely reduced.  IVC diameter >2.1 cm collapsing <50% with sniff suggests a high RA pressure  estimated at 15 mmHg or greater. No pericardial effusion is present.     Compared to prior study on 1/16/20, LVIDd appears slightly larger. Patient is  severely tachycardic now. IVC appears more dilated.    7/24/2019 [facility]   Time  Systolic  Diastolic  Mean  A Wave  V Wave  EDP  Max dp/dt  HR    RA Pressures  10:04 AM    10 mmHg     57675 mmHg     95487 mmHg       55 bpm       RV Pressures   9:42 AM        384 mmHg/sec         10:01 AM  25 mmHg         10 mmHg      39 bpm       PA Pressures  10:01 AM  25 mmHg     16 mmHg     19 mmHg         80 bpm       PCW Pressures  10:02 AM    11 mmHg     67793 mmHg     32927 mmHg       68 bpm          Time  TDCO   TDCI  Kee C.O.  Kee C.I.  Kee HR    Cardiac Output Results   9:42 AM  4.63 L/min     2.02 L/min/m2     4.62 L/min     2.01 L/min/m2            _____________________________________________________________________________    Assessment and Plan:   Jim Willingham is a 65 year old male with a past medical history of NICM (EF 20%) s/p HM II LVAD placement (6/19/17) at  (obesity) Other relavant history includes chronic kidney disease stage III, diabetes mellitus type 2, RV failure, CECILIA, obesity, hypertension, COPD, asthma. He presents for LVAD follow-up with complaints of RESENDIZ.    Recent hospitalization as above.    Today he complains of worsening dyspnea on exertion.  Difficult to understand his volume status given this was a phone visit however I think it is much more likely that he is hypovolemic.  He had a very hypovolemic catheterization around 228 pounds he is now 230 pounds.  He has been dieting so possible that he is lost some weight and now has a lower dry weight, but without seeing them its very difficult to rely on this.  His BUN to creatinine ratio is above 20-1.  LDH is stable.  He does have a downtrending hemoglobin but no bleeding symptoms.  We will recheck his hemoglobin next week.  We will also arrange for IV iron as he is iron deficient again.  We will have him sign a remote ICD check as he has had similar symptoms with A. fib in the past.  If he is not having A. fib I would recommend that he actually decrease his Bumex to 0.5 twice daily from 0.5 in the morning and 1 mg in the afternoon.  He may need to come in for an in person appointment to get a better feel for his volume exam.        #  Chronic systolic heart failure secondary to NICM  Stage D  NYHA Class IIIA  ACEi/ARB: Losartan stopped d/t fluctuating renal function, continue hydralazine.  BB: Stopped during recent admission  Aldosterone antagonist: Stopped d/t hyperkalemia.  SCD prophylaxis: ICD  Fluid status:  Slightly hypovolemic by history-  will continue current medications for now, but decreased bumex if we do not fine an alternative explaination for hie RESENDIZ on ICD check.     #  S/P LVAD implant as DT due to obesity. Goal is to loose weight to be eligible for transplant.  Anticoagulation: Warfarin with INR goal of 2-3,  dosing per coumadin clinic  Antiplatelet: Aspirin 81 mg daily- okay to hold while supratheraputic inr.  MAP: Goal 65-85 no reading today given virtual visit  LDH: Stable, 384, BL around 380-450  Other: Will identify a new person to train on drivelien dressings given his current problems with his wife  VAD Interrogation today:  NA a telephone visit.No audible alarms per patient    # RV failure  - Last RHC wedge was 4, RA was 6.  - Now off metoprolol, continue amidoarone  - Working towards weight loss so that he can be evaluated/listed for transplant     # Obesity:  Recommend weight loss with a goal weight of 255 which he has now met.     # A. Fib  New in Jan. Now S/p Cardioversion in 5/2020. Symptoms improved dramatically. In sinus on last ICD check. No a. Fib episodes since cardioversion  - Continue amiodarone and coumadin  - Recheck ICD given recurrent RESENDIZ     # CECILIA: Continue CPAP.       # Chronic kidney disease with WALTER: Recent WALTER with peak of 2.40, continues to improve. 1.48 today, still above his baseline. BL in the past year has been 1.2-1.4  - Bumex decrease as above  - Recheck in 2 weeks    # Iron deficiency anemia  - Iron saturation down to 10%  - Will arrange for IV iron infusions: Venofer 300 x3, dose weekly  - Recheck Hgb next week, call for bleeding symptoms    Follow-up  - ICD check today  - Please get bloodwork done when you are here on Tuesday (downtrending Hgb, possible bumex change)  - 1 month with Melisa- need to schedule. If you are feeling well, can be virtual, if you are s till having these symptoms will need to bring you into clinic  - Dr. Montgomery in December as scheduled    Didi Butler  "CATALINA MCGOWAN  Answers for HPI/ROS submitted by the patient on 9/9/2020   General Symptoms: No  Skin Symptoms: No  HENT Symptoms: No  EYE SYMPTOMS: No  HEART SYMPTOMS: No  LUNG SYMPTOMS: No  INTESTINAL SYMPTOMS: No  URINARY SYMPTOMS: No  REPRODUCTIVE SYMPTOMS: No  SKELETAL SYMPTOMS: No  BLOOD SYMPTOMS: No  NERVOUS SYSTEM SYMPTOMS: No  MENTAL HEALTH SYMPTOMS: No      Jim DAGO Willingham is a 65 year old male who is being evaluated via a billable video visit.      The patient has been notified of following:     \"This video visit will be conducted via a call between you and your physician/provider. We have found that certain health care needs can be provided without the need for an in-person physical exam.  This service lets us provide the care you need with a video conversation.  If a prescription is necessary we can send it directly to your pharmacy.  If lab work is needed we can place an order for that and you can then stop by our lab to have the test done at a later time.    If during the course of the call the physician/provider feels a video visit is not appropriate, you will not be charged for this service.\"    Patient has given verbal consent for Video visit? Yes  How would you like to obtain your AVS? Mail a copy  If you are dropped from the video visit, the video invite should be resent to:   Will anyone else be joining your video visit? No            "

## 2020-09-10 NOTE — TELEPHONE ENCOUNTER
----- Message from Jacque Mims MD sent at 9/10/2020 12:33 PM CDT -----  Regarding: FW: mutual patient  This is the new patient DM I saw yesterday.   Transplant dietitian requests to DM dietitian to be seen for carb counting.   Could you assist?    Jacque  ----- Message -----  From: Yadira Granger RD  Sent: 9/10/2020   8:05 AM CDT  To: Jacque Mims MD  Subject: RE: mutual patient                               Hello,  Please place a referral for a certified diabetes educator. There is 1-2 of them at the [facility] or else [facility] system has a lot. Thanks!    Nuris   ----- Message -----  From: Jacque Mims MD  Sent: 9/9/2020   4:06 PM CDT  To: Yadira Granger RD  Subject: mutual patient                                   Hi    I saw this patient for DM today. He needs to learn carb counting.   Could you teach him? , or should I refer to our DM nutritionist?     Thank you very much for your care    Jacque Mims MD   Endocrinology [facility]

## 2020-09-10 NOTE — TELEPHONE ENCOUNTER
Referral placed for both diabetes educator and dietician for carb counting . Charleen Reynolds RN on 9/10/2020 at 12:52 PM

## 2020-09-11 ENCOUNTER — CARE COORDINATION (OUTPATIENT)
Dept: CARDIOLOGY | Facility: CLINIC | Age: 65
End: 2020-09-11

## 2020-09-11 ENCOUNTER — ANCILLARY PROCEDURE (OUTPATIENT)
Dept: CARDIOLOGY | Facility: CLINIC | Age: 65
End: 2020-09-11
Attending: INTERNAL MEDICINE
Payer: COMMERCIAL

## 2020-09-11 ENCOUNTER — TELEPHONE (OUTPATIENT)
Dept: TRANSPLANT | Facility: CLINIC | Age: 65
End: 2020-09-11

## 2020-09-11 DIAGNOSIS — I42.9 CARDIOMYOPATHY (H): ICD-10-CM

## 2020-09-11 LAB
CMV IGG SERPL QL IA: >8 AI (ref 0–0.8)
EBV VCA IGG SER QL IA: 3.8 AI (ref 0–0.8)
HSV1 IGG SERPL QL IA: >8 AI (ref 0–0.8)
HSV2 IGG SERPL QL IA: <0.2 AI (ref 0–0.8)
T GONDII IGG SER QL: <3 IU/ML (ref 0–7.1)
T PALLIDUM AB SER QL: NONREACTIVE
VZV IGG SER QL IA: 1.1 AI (ref 0–0.8)

## 2020-09-11 PROCEDURE — 93296 REM INTERROG EVL PM/IDS: CPT | Mod: ZF

## 2020-09-11 PROCEDURE — 99207 CARDIAC DEVICE CHECK - REMOTE: CPT | Performed by: INTERNAL MEDICINE

## 2020-09-11 RX ORDER — HEPARIN SODIUM (PORCINE) LOCK FLUSH IV SOLN 100 UNIT/ML 100 UNIT/ML
5 SOLUTION INTRAVENOUS
Status: CANCELLED | OUTPATIENT
Start: 2020-09-11

## 2020-09-11 RX ORDER — HEPARIN SODIUM,PORCINE 10 UNIT/ML
5 VIAL (ML) INTRAVENOUS
Status: CANCELLED | OUTPATIENT
Start: 2020-09-11

## 2020-09-11 NOTE — TELEPHONE ENCOUNTER
Spoke to Jim, reviewed schedule for evaluation.  Patient working with VAD coordinator to set up an appointment in clinic to be evaluated next week (due to fluid retention).  Patient had questions in regards to PFTs-discussed need for them and will complete for evaluation as per protocol.  Patient verbalized understanding of the plan and agrees to call Transplant Coordinator with any further questions or concerns.

## 2020-09-11 NOTE — PROGRESS NOTES
Called pt to follow-up on virtual visit with ROGER Rivera yesterday. Pt reports continued mild/moderate SOB. He endorses mild pitting edema to BLE, no abdominal bloating or distention. Relayed instructions to pt to send an ICD transmission this am. If check is normal, will instruct pt to come to clinic for in person appt in the next 1-2 weeks to assess volume status.    Also instructed pt to call [facility] to schedule appointments for IV iron.   Pt verbalized understanding.

## 2020-09-14 ENCOUNTER — VIRTUAL VISIT (OUTPATIENT)
Dept: CARDIOLOGY | Facility: CLINIC | Age: 65
End: 2020-09-14
Attending: INTERNAL MEDICINE
Payer: COMMERCIAL

## 2020-09-14 ENCOUNTER — ALLIED HEALTH/NURSE VISIT (OUTPATIENT)
Dept: TRANSPLANT | Facility: CLINIC | Age: 65
End: 2020-09-14
Attending: INTERNAL MEDICINE
Payer: COMMERCIAL

## 2020-09-14 DIAGNOSIS — I42.9 CARDIOMYOPATHY (H): ICD-10-CM

## 2020-09-14 LAB
GAMMA INTERFERON BACKGROUND BLD IA-ACNC: 0.05 IU/ML
M TB IFN-G CD4+ BCKGRND COR BLD-ACNC: 7.38 IU/ML
M TB TUBERC IFN-G BLD QL: NEGATIVE
MITOGEN IGNF BCKGRD COR BLD-ACNC: 0.01 IU/ML
MITOGEN IGNF BCKGRD COR BLD-ACNC: 0.01 IU/ML
SA1 CELL: NORMAL
SA1 COMMENTS: NORMAL
SA1 HI RISK ABY: NORMAL
SA1 MOD RISK ABY: NORMAL
SA1 TEST METHOD: NORMAL
SA2 CELL: NORMAL
SA2 COMMENTS: NORMAL
SA2 HI RISK ABY UA: NORMAL
SA2 MOD RISK ABY: NORMAL
SA2 TEST METHOD: NORMAL
UNACCEPTABLE ANTIGEN: NORMAL
UNOS CPRA: 0

## 2020-09-14 NOTE — LETTER
9/14/2020      RE: Jim Willingham  [address] [place] [id number]       Dear Colleague,    Thank you for the opportunity to participate in the care of your patient, Jim Willingham, at the [facility] at [facility]. Please see a copy of my visit note below.    Patient of Dr. Montgomery assessed via telephone visit for psychosocial assessment.   60 minutes spent with patient. 100% of visit consisted of counseling related to issues surrounding Heart Transplant.    Psychosocial Assessment   Name: Jim Willingham     MRN:  [id number]        Patient Name / Age / Race: Jim Willingham 65 year old -American   Source of Information: Patient and his wife, Cate   : LYNDA Méndez   Interview Date: September 14, 2020   Reason for Referral: Heart Transplant     Current Living Situation   Location ([facility]/State): [address] [place] [id number]   With Whom: Living arrangements - the patient lives with their spouse and 8 yo adopted great granddaughterGraham.   Type of Home: single family: split-level   Working Phone? Yes    Three Pronged Outlet? Yes    Distance from Hospital: 30 minutes    Need to Relocate Post Surgery? No   Discussed? Yes      Vocational/Employment/Financial   Employment: Disabled/Retired-since 2008   Occupation: Prior to [employer], pt worked as a respiratory therapist at [employer]   Education: High School + 1yr of respiratory certification    [place]? No   Income: SSD, spouse's income and other: great granddaughter's social security and adoption assistance fund    Insurance: Medicare and Private Insurance   Name of Private Insurance: [employer]-through spouse's employment    Medication Coverage: Yes     In current situation, pt. can afford medication and supply costs:  No      Other Financial Concerns/Issues: Pt reports he does not have Medicare B or D--referred him to [facility] Line as open enrollment is  "coming up to check to see if he needs to opt in to B and D.      Family/Social Support   Marital Status:    Partner Name: Cate   Length of Time : 21 yrs    Partner s Employment: Respiratory Therapist at [employer]    Relationship: stable/supportive   Children: 2 Biological Children and 4 Step-Sons   Parents: Father lives in [place]   Siblings: 2 brothers and 1 sister-local    Other Family or Friends Close by: friends   Support System: available, helpful   Primary Support Person: Pt's wife, Cate   Issues: Pt's wife has no concerns in being able to fulfill caregiver duties. Wife did share that her 1st  did die shortly after heart transplant, in 1988. Will require added support post transplant.       Activities/Functional Ability   Current Level: ambulatory and independent with ADL's   Daily Activities: Daily household tasks, home schooling adopted great granddaughter   Transportation: self      Medical Status   Patient s understanding of need for surgical intervention: Good understanding.    Advanced Directive? No    Details: Pt open to having writer email him a blank HCD to complete.   Email: [email or web address]   Adherence History: Pt reports he has done \"very well\" with f/u post LVAD.    Ability to Adhere to Complex Medical Regime: Great-Pt has been managing his LVAD since 6/2017     Behavioral   Chemical Dependency Issues: Yes : Pt with hx of heavy alcohol use but quite in 2010. Pt quite when his wife went to CD treatment. Pt has never been to CD treatment. Pt reports he continues not to drink. Pt does endorse drinking a Heineken 0.0 every once in awhile. Pt has hx of marijuana use but reports he had not used for several years prior the LVAD and continues not to use.    Smoker? No   Psychiatric impairment: Yes : Anxiety-pt reports this has been controlled by Celexa. Pt has not had any psychiatric hospitalizations or suicide attempts. Pt reports his Provider recently increased the Celexa " dosage and he has tolerated this well.    Coping Style/Strategies: Adores his adopted great granddaughter, strong willa, generally has a positive outlook and optimistic    Recent Legal History: No   Teaching Completed During Assessment Related To Transplant: 1. Housing and relocation needs post surgery.  2. Caregiver needs post surgery.  3. Financial issues related to surgery.  4. Risks of alcohol use post surgery.  5. Common psychosocial stressors pre/post surgery.  6. Support group availability.   Psychosocial Risks Reviewed Related To Transplant: 1. Increased stress related to your emotional, family, social, employment, or financial situation.  2. Affect on work and/or disability benefits.  3. Affect on future health and life insurance.  4. Outcome expectations may not be met.  5. Mental Health risks: anxiety, depression, PTSD, guilt, grief and chronic fatigue.     Observed Behavior   Well informed? Yes  Angry? No   Process info well? Yes  Hostile? No   Evasive? No Oriented X3? Yes    Cautious/Suspicious? No Motivated? Yes    Appropriate Behavior? Yes  Depressed? No   Appropriate Affect? Yes  Anxious? Yes -appropriate to situation    Interview Observations: Pt and wife pleasant, engaged and asked appropriate questions. Pt appropriately anxious but motivated to proceed with LVAD. Clearly motivated by his great granddaughter and wanting to be here for her.      Selection Criteria Met   Plan for Support Yes    Chemical Dependence Yes    Smoking Yes    Mental Health Yes    Adequate Finances Yes      Risk Issues:    No risk issues identified.     Final Evaluation/Assessment:     Pt is s/p LVAD implant June 2016. Pt has done well management of his LVAD and is eager to proceed with heart transplant evaluation. Pt and his wife, Cate, participated in assessment. Pt's wife openly expressed some anxiety about pt moving ahead with heart transplant as her first  passed away shortly after heart transplant. Pt appears to  have a great support system and his wife will be the primary caregiver post-transplant. There are no current concerns with chemical dependency or smoking. Pt does have anxiety, but this has been well managed on Celexa. Pt has adequate insurance and finances.     From a psychosocial perspective, pt is a good candidate to proceed with heart transplant.     Patient understands risks and benefits of Heart Transplant: Yes     Recommendation:    good    Signature: LYNDA Méndez     Title: Licensed Independent Clinical                Interview Date: September 14, 2020    Please do not hesitate to contact me if you have any questions/concerns.     Sincerely,     LYNDA Méndez

## 2020-09-14 NOTE — PROGRESS NOTES
Patient of Dr. Montgomery assessed via telephone visit for psychosocial assessment.   60 minutes spent with patient. 100% of visit consisted of counseling related to issues surrounding Heart Transplant.    Psychosocial Assessment   Name: Jim Willingham     MRN:  [id number]        Patient Name / Age / Race: Jim Willingham 65 year old -American   Source of Information: Patient and his wife, Cate   : LYNDA Méndez   Interview Date: September 14, 2020   Reason for Referral: Heart Transplant     Current Living Situation   Location ([facility]/State): [address] [place] [phone]   With Whom: Living arrangements - the patient lives with their spouse and 6 yo adopted great granddaughter, Graham.   Type of Home: single family: split-level   Working Phone? Yes    Three Pronged Outlet? Yes    Distance from Hospital: 30 minutes    Need to Relocate Post Surgery? No   Discussed? Yes      Vocational/Employment/Financial   Employment: Disabled/Retired-since 2008   Occupation: Prior to snf, pt worked as a respiratory therapist at [employer]   Education: High School + 1yr of respiratory certification    [place]? No   Income: SSD, spouse's income and other: great granddaughter's social security and adoption assistance fund    Insurance: Medicare and Private Insurance   Name of Private Insurance: [employer]-through spouse's employment    Medication Coverage: Yes     In current situation, pt. can afford medication and supply costs:  No      Other Financial Concerns/Issues: Pt reports he does not have Medicare B or D--referred him to [facility] Line as open enrollment is coming up to check to see if he needs to opt in to B and D.      Family/Social Support   Marital Status:    Partner Name: Cate   Length of Time : 21 yrs    Partner s Employment: Respiratory Therapist at [employer]    Relationship: stable/supportive   Children: 2 Biological Children and 4  "Step-Sons   Parents: Father lives in [place]   Siblings: 2 brothers and 1 sister-local    Other Family or Friends Close by: friends   Support System: available, helpful   Primary Support Person: Pt's wifeCate   Issues: Pt's wife has no concerns in being able to fulfill caregiver duties. Wife did share that her 1st  did die shortly after heart transplant, in 1988. Will require added support post transplant.       Activities/Functional Ability   Current Level: ambulatory and independent with ADL's   Daily Activities: Daily household tasks, home schooling adopted great granddaughter   Transportation: self      Medical Status   Patient s understanding of need for surgical intervention: Good understanding.    Advanced Directive? No    Details: Pt open to having writer email him a blank HCD to complete.   Email: [email or web address]   Adherence History: Pt reports he has done \"very well\" with f/u post LVAD.    Ability to Adhere to Complex Medical Regime: Great-Pt has been managing his LVAD since 6/2017     Behavioral   Chemical Dependency Issues: Yes : Pt with hx of heavy alcohol use but quite in 2010. Pt quite when his wife went to CD treatment. Pt has never been to CD treatment. Pt reports he continues not to drink. Pt does endorse drinking a Heineken 0.0 every once in awhile. Pt has hx of marijuana use but reports he had not used for several years prior the LVAD and continues not to use.    Smoker? No   Psychiatric impairment: Yes : Anxiety-pt reports this has been controlled by Celexa. Pt has not had any psychiatric hospitalizations or suicide attempts. Pt reports his Provider recently increased the Celexa dosage and he has tolerated this well.    Coping Style/Strategies: Adores his adopted great granddaughter, strong willa, generally has a positive outlook and optimistic    Recent Legal History: No   Teaching Completed During Assessment Related To Transplant: 1. Housing and relocation needs post " surgery.  2. Caregiver needs post surgery.  3. Financial issues related to surgery.  4. Risks of alcohol use post surgery.  5. Common psychosocial stressors pre/post surgery.  6. Support group availability.   Psychosocial Risks Reviewed Related To Transplant: 1. Increased stress related to your emotional, family, social, employment, or financial situation.  2. Affect on work and/or disability benefits.  3. Affect on future health and life insurance.  4. Outcome expectations may not be met.  5. Mental Health risks: anxiety, depression, PTSD, guilt, grief and chronic fatigue.     Observed Behavior   Well informed? Yes  Angry? No   Process info well? Yes  Hostile? No   Evasive? No Oriented X3? Yes    Cautious/Suspicious? No Motivated? Yes    Appropriate Behavior? Yes  Depressed? No   Appropriate Affect? Yes  Anxious? Yes -appropriate to situation    Interview Observations: Pt and wife pleasant, engaged and asked appropriate questions. Pt appropriately anxious but motivated to proceed with LVAD. Clearly motivated by his great granddaughter and wanting to be here for her.      Selection Criteria Met   Plan for Support Yes    Chemical Dependence Yes    Smoking Yes    Mental Health Yes    Adequate Finances Yes      Risk Issues:    No risk issues identified.     Final Evaluation/Assessment:     Pt is s/p LVAD implant June 2016. Pt has done well management of his LVAD and is eager to proceed with heart transplant evaluation. Pt and his wife, Cate, participated in assessment. Pt's wife openly expressed some anxiety about pt moving ahead with heart transplant as her first  passed away shortly after heart transplant. Pt appears to have a great support system and his wife will be the primary caregiver post-transplant. There are no current concerns with chemical dependency or smoking. Pt does have anxiety, but this has been well managed on Celexa. Pt has adequate insurance and finances.     From a psychosocial perspective,  pt is a good candidate to proceed with heart transplant.     Patient understands risks and benefits of Heart Transplant: Yes     Recommendation:    good    Signature: Kadie Pedro [facility]     Title: Licensed Independent Clinical                Interview Date: September 14, 2020

## 2020-09-15 ENCOUNTER — ANCILLARY PROCEDURE (OUTPATIENT)
Dept: BONE DENSITY | Facility: CLINIC | Age: 65
End: 2020-09-15
Attending: INTERNAL MEDICINE

## 2020-09-15 ENCOUNTER — ANCILLARY PROCEDURE (OUTPATIENT)
Dept: ULTRASOUND IMAGING | Facility: CLINIC | Age: 65
End: 2020-09-15
Attending: INTERNAL MEDICINE

## 2020-09-15 ENCOUNTER — CARE COORDINATION (OUTPATIENT)
Dept: CARDIOLOGY | Facility: CLINIC | Age: 65
End: 2020-09-15

## 2020-09-15 ENCOUNTER — ANTICOAGULATION THERAPY VISIT (OUTPATIENT)
Dept: ANTICOAGULATION | Facility: CLINIC | Age: 65
End: 2020-09-15

## 2020-09-15 DIAGNOSIS — I42.9 CARDIOMYOPATHY (H): ICD-10-CM

## 2020-09-15 DIAGNOSIS — Z79.01 LONG TERM CURRENT USE OF ANTICOAGULANT THERAPY: ICD-10-CM

## 2020-09-15 DIAGNOSIS — Z95.811 LVAD (LEFT VENTRICULAR ASSIST DEVICE) PRESENT (H): ICD-10-CM

## 2020-09-15 DIAGNOSIS — I50.22 CHRONIC SYSTOLIC CONGESTIVE HEART FAILURE (H): ICD-10-CM

## 2020-09-15 DIAGNOSIS — D50.9 IRON DEFICIENCY ANEMIA, UNSPECIFIED IRON DEFICIENCY ANEMIA TYPE: ICD-10-CM

## 2020-09-15 LAB
ANION GAP SERPL CALCULATED.3IONS-SCNC: 9 MMOL/L (ref 3–14)
BUN SERPL-MCNC: 27 MG/DL (ref 7–30)
CALCIUM SERPL-MCNC: 8.8 MG/DL (ref 8.5–10.1)
CHLORIDE SERPL-SCNC: 102 MMOL/L (ref 94–109)
CO2 SERPL-SCNC: 26 MMOL/L (ref 20–32)
CREAT SERPL-MCNC: 1.64 MG/DL (ref 0.66–1.25)
GFR SERPL CREATININE-BSD FRML MDRD: 44 ML/MIN/{1.73_M2}
GLUCOSE SERPL-MCNC: 272 MG/DL (ref 70–99)
HGB BLD-MCNC: 9.7 G/DL (ref 13.3–17.7)
INR PPP: 3.74 (ref 0.86–1.14)
POTASSIUM SERPL-SCNC: 4.2 MMOL/L (ref 3.4–5.3)
SODIUM SERPL-SCNC: 137 MMOL/L (ref 133–144)

## 2020-09-15 RX ORDER — POTASSIUM CHLORIDE 750 MG/1
20 TABLET, EXTENDED RELEASE ORAL DAILY
Qty: 30 TABLET | Refills: 4 | COMMUNITY
Start: 2020-09-15 | End: 2020-11-20

## 2020-09-15 RX ORDER — BUMETANIDE 0.5 MG/1
3 TABLET ORAL 2 TIMES DAILY
Qty: 180 TABLET | Refills: 3 | COMMUNITY
Start: 2020-09-15 | End: 2020-11-20

## 2020-09-15 NOTE — PROGRESS NOTES
ANTICOAGULATION FOLLOW-UP CLINIC VISIT    Patient Name:  Jim Willingham  Date:  9/15/2020  Contact Type:  Telephone    SUBJECTIVE:         OBJECTIVE    Recent labs: (last 7 days)     09/15/20  1040   INR 3.74*       ASSESSMENT / PLAN  INR assessment SUPRA    Recheck INR In: 1 WEEK    INR Location Clinic      Anticoagulation Summary  As of 9/15/2020    INR goal:   2.0-3.0   TTR:   51.1 % (11.3 mo)   INR used for dosing:   3.74! (9/15/2020)   Warfarin maintenance plan:   7.5 mg (5 mg x 1.5) every Sun, Thu; 5 mg (5 mg x 1) all other days   Full warfarin instructions:   7.5 mg every Sun, Thu; 5 mg all other days   Weekly warfarin total:   40 mg   Plan last modified:   Delisa Hillman RN (9/15/2020)   Next INR check:   9/22/2020   Priority:   Critical   Target end date:   Indefinite    Indications    LVAD (left ventricular assist device) present (H) [Z95.811]  Long-term (current) use of anticoagulants [Z79.01] [Z79.01]  Chronic systolic congestive heart failure (H) [I50.22]             Anticoagulation Episode Summary     INR check location:       Preferred lab:       Send INR reminders to:   [facility]    Comments:   +++Patient drawn at Home care visit Q Mon. (8/10/20)+++  Labs drawn either at [facility] or [facility], See 3/5/20 ADDENDUM, pt. weekly INR >2.0 for Cardioversion  LVAD placed 6/19/17   II  ASA 81 mg daily      Anticoagulation Care Providers     Provider Role Specialty Phone number    Delisa Montgomery MD Referring Cardiology [phone]            See the Encounter Report to view Anticoagulation Flowsheet and Dosing Calendar (Go to Encounters tab in chart review, and find the Anticoagulation Therapy Visit)  Left message for patient with results and dosing recommendations. Asked patient to call back to report any missed doses, falls, signs and symptoms of bleeding or clotting, any changes in health, medication, or diet. Asked patient to call back with any questions or  concerns.  Patient had LVAD placed on:   6/19/17  Type of LVAD: HM2  Patient's current Aspirin dose: 81mg  LVAD Protocol followed:  Yes  Delisa Hillman RN

## 2020-09-15 NOTE — PROGRESS NOTES
Pt had labs drawn today as requested. Called pt to check in. Pt reports weight is 231lb and he continues to have shortness of breath - he has noticed a slight increase over the last 1.5 days to now having some SOB at rest as well. He continues to have some slight swelling in his ankles. No abdominal distention. Lab results reviewed by ROGER Rivera, who gave instructions to increase Bumex to 1mg BID and Kcl to 20mEq daily. Reviewed instructions with pt, who verbalized understanding. Pt will return to clinic on 9/21 and have labs drawn prior.

## 2020-09-15 NOTE — PROGRESS NOTES
"Jim Willingham is a 65 year old male who is being evaluated via a billable telephone visit.      The patient has been notified of following:     \"This telephone visit will be conducted via a call between you and your physician/provider. We have found that certain health care needs can be provided without the need for a physical exam.  This service lets us provide the care you need with a short phone conversation.  If a prescription is necessary we can send it directly to your pharmacy.  If lab work is needed we can place an order for that and you can then stop by our lab to have the test done at a later time.    Telephone visits are billed at different rates depending on your insurance coverage. During this emergency period, for some insurers they may be billed the same as an in-person visit.  Please reach out to your insurance provider with any questions.    If during the course of the call the physician/provider feels a telephone visit is not appropriate, you will not be charged for this service.\"    Patient has given verbal consent for Telephone visit?  yes    Phone call duration: 30 minutes    Outpatient MNT: Heart Transplant Evaluation    Current BMI: 35.6 (HT 68 in,  lbs/106 kg)- data from 8/7  BMI is outside recommendation of <35 for heart transplant  Goal weight for heart transplant <230 lbs (4 lb loss)     Time Spent: 30 minutes  Visit Type: Initial  Referring Physician: Ulises  Pt accompanied by: self    Medical dx associated with RD referral  - DM II  - Cardiomyopathy     History of previous txp: none, but had LVAD placed 6/2017    Nutrition Assessment  Pt had a gastric bypass in 2003. Highest weight was 360 lbs, lost down to 220 lbs, regained and stabilized ~270 lbs. He watches the sodium in his diet. He also has a dexcom and reports closely monitoring his BG. On average, he has been eating 1 meal/day. Appetite overall is reduced 2/2 medical status and ?unknown per pt. However, he is not " concerned and welcomes this reduced appetite in order to lose weight for txp.     Vitamins, Supplements, Pertinent Meds: zinc   Herbal Medicines/Supplements: none     Diet Recall  Breakfast None    Lunch None    Dinner May snack on veggies, protein (boiled egg, beef jerky) or may have a larger meal, such as burger w/o bun, chicken, fish, pork, asparagus, veggies, or salad   Snacks Unsalted pistachios, celery and spreadable cheese    Beverages Water, coffee, occasional diet dew and sf monster   Alcohol NA beer <1/week    Dining out 1x/month      Physical Activity  Minimal now d/t SOB and ?fluid status; does some house work and ADLs     Anthropometrics  Height:   68 in   BMI:    35.6    Weight Status:Obesity Grade II BMI 35-39.9   Weight:  234 lbs (8/7)            IBW (lb): 154  % IBW: 152    Wt Hx: Pt reports some edema. He has lost 40 lbs since April (by reduced appetite; unintentional weight loss), yet welcomed by patient. He reports current weight is 228 lbs. However, chart review shows weight loss from 255-->last documented weight 234 lbs of 8%.     Adj/dosing BW: 174 lbs/79 kg (using last weight documented of 234 lbs)       Malnutrition  % Intake: <75% for >/= 3 months (non-severe malnutrition)  % Weight Loss: Up to 10% in 6 months (non-severe malnutrition)  Subcutaneous Fat Loss: Unable to assess  Muscle Loss: Unable to assess  Fluid Accumulation/Edema: yes, but to unknown degree  Malnutrition Diagnosis: Non-Severe malnutrition in the context of chronic illness     Estimated Nutrition Needs  Energy  1580     (20 kcal/kg dosing BW for desired wt loss)     Protein  63-79    (0.8-1 g/kg for maintenance)           Fluid  Pending MD   Micronutrient   Na+: <2000 mg/day              Nutrition Diagnosis  Food and nutrition related knowledge deficit r/t pre heart transplant eval AEB pt verbalized not hearing pre/post transplant diet guidelines.    Nutrition Intervention  Nutrition education provided:  Discussed sodium  intake (low sodium foods and drinks, seasoning food without salt and tips for low sodium diet). Reviewed weight and BMI guidelines. Pt feels comfortable maintaining current weight vs continuing to lose some weight. He reports a personal goal of achieving 200 lbs.     Reviewed post txp diet guidelines in brief (will review in further detail post txp):  (1) Review of proper food safety measures d/t immunosuppressant therapy post-op and increased risk for food-borne illness    (2) Avoid the following post txp d/t risk for rejection, unknown effects on the organs, and/or potential interactions with immunosuppressants:  - Herbal, Chinese, holistic, chiropractic, natural, alternative medicines and supplements  - Detoxes and cleanses  - Weight loss pills  - Protein powders or other products with extracts or herbs (ie green tea extract)    (3) Med regimen and possible side effects    Reviewed post VAD nutrition:  High likelihood of early satiety postop with placement of VAD, requiring small/frequent meals. Discussed need for high protein intake for healing and how adequate protein intake may be impacted by early satiety.     Patient Understanding: Pt verbalized understanding of education provided.  Expected Compliance: Good  Follow-Up Plans: PRN; will check weight in 1 month and f/u as needed      Nutrition Goals  1. Limit Na+ <2000mg/day  2. Pt to verbalize understanding of 3 aspects of post txp education provided   3. BMI to remain <35 or <230 lbs     Provided pt with contact info.   Nuris Granger, RD, LD, CCTD  Pgr [phone]

## 2020-09-16 ENCOUNTER — INFUSION THERAPY VISIT (OUTPATIENT)
Dept: INFUSION THERAPY | Facility: CLINIC | Age: 65
End: 2020-09-16
Payer: COMMERCIAL

## 2020-09-16 VITALS
SYSTOLIC BLOOD PRESSURE: 98 MMHG | DIASTOLIC BLOOD PRESSURE: 61 MMHG | HEART RATE: 86 BPM | OXYGEN SATURATION: 96 % | RESPIRATION RATE: 18 BRPM | TEMPERATURE: 98.8 F

## 2020-09-16 DIAGNOSIS — I50.22 CHRONIC SYSTOLIC CONGESTIVE HEART FAILURE (H): ICD-10-CM

## 2020-09-16 DIAGNOSIS — D50.9 IRON DEFICIENCY ANEMIA, UNSPECIFIED IRON DEFICIENCY ANEMIA TYPE: Primary | ICD-10-CM

## 2020-09-16 LAB
MDC_IDC_LEAD_IMPLANT_DT: NORMAL
MDC_IDC_LEAD_LOCATION: NORMAL
MDC_IDC_LEAD_LOCATION_DETAIL_1: NORMAL
MDC_IDC_LEAD_MFG: NORMAL
MDC_IDC_LEAD_MODEL: NORMAL
MDC_IDC_LEAD_POLARITY_TYPE: NORMAL
MDC_IDC_LEAD_SERIAL: NORMAL
MDC_IDC_MSMT_BATTERY_DTM: NORMAL
MDC_IDC_MSMT_BATTERY_REMAINING_LONGEVITY: 11 MO
MDC_IDC_MSMT_BATTERY_RRT_TRIGGER: 2.73
MDC_IDC_MSMT_BATTERY_STATUS: NORMAL
MDC_IDC_MSMT_BATTERY_VOLTAGE: 2.89 V
MDC_IDC_MSMT_CAP_CHARGE_DTM: NORMAL
MDC_IDC_MSMT_CAP_CHARGE_DTM: NORMAL
MDC_IDC_MSMT_CAP_CHARGE_ENERGY: 18 J
MDC_IDC_MSMT_CAP_CHARGE_ENERGY: 35 J
MDC_IDC_MSMT_CAP_CHARGE_TIME: 4.52
MDC_IDC_MSMT_CAP_CHARGE_TIME: 9.93
MDC_IDC_MSMT_CAP_CHARGE_TYPE: NORMAL
MDC_IDC_MSMT_CAP_CHARGE_TYPE: NORMAL
MDC_IDC_MSMT_LEADCHNL_LV_IMPEDANCE_VALUE: 399 OHM
MDC_IDC_MSMT_LEADCHNL_LV_IMPEDANCE_VALUE: 4047 OHM
MDC_IDC_MSMT_LEADCHNL_LV_IMPEDANCE_VALUE: 4047 OHM
MDC_IDC_MSMT_LEADCHNL_LV_PACING_THRESHOLD_AMPLITUDE: 0.62 V
MDC_IDC_MSMT_LEADCHNL_LV_PACING_THRESHOLD_PULSEWIDTH: 0.4 MS
MDC_IDC_MSMT_LEADCHNL_RA_IMPEDANCE_VALUE: 456 OHM
MDC_IDC_MSMT_LEADCHNL_RA_PACING_THRESHOLD_AMPLITUDE: 1.25 V
MDC_IDC_MSMT_LEADCHNL_RA_PACING_THRESHOLD_PULSEWIDTH: 0.4 MS
MDC_IDC_MSMT_LEADCHNL_RA_SENSING_INTR_AMPL: 1.12 MV
MDC_IDC_MSMT_LEADCHNL_RA_SENSING_INTR_AMPL: 1.12 MV
MDC_IDC_MSMT_LEADCHNL_RV_IMPEDANCE_VALUE: 247 OHM
MDC_IDC_MSMT_LEADCHNL_RV_IMPEDANCE_VALUE: 342 OHM
MDC_IDC_MSMT_LEADCHNL_RV_PACING_THRESHOLD_AMPLITUDE: 1 V
MDC_IDC_MSMT_LEADCHNL_RV_PACING_THRESHOLD_PULSEWIDTH: 0.4 MS
MDC_IDC_MSMT_LEADCHNL_RV_SENSING_INTR_AMPL: 5.38 MV
MDC_IDC_MSMT_LEADCHNL_RV_SENSING_INTR_AMPL: 5.38 MV
MDC_IDC_PG_IMPLANT_DTM: NORMAL
MDC_IDC_PG_MFG: NORMAL
MDC_IDC_PG_MODEL: NORMAL
MDC_IDC_PG_SERIAL: NORMAL
MDC_IDC_PG_TYPE: NORMAL
MDC_IDC_SESS_CLINIC_NAME: NORMAL
MDC_IDC_SESS_DTM: NORMAL
MDC_IDC_SESS_TYPE: NORMAL
MDC_IDC_SET_BRADY_LOWRATE: 50 {BEATS}/MIN
MDC_IDC_SET_BRADY_MAX_SENSOR_RATE: 130 {BEATS}/MIN
MDC_IDC_SET_BRADY_MODE: NORMAL
MDC_IDC_SET_CRT_PACED_CHAMBERS: NORMAL
MDC_IDC_SET_LEADCHNL_LV_PACING_ANODE_ELECTRODE_1: NORMAL
MDC_IDC_SET_LEADCHNL_LV_PACING_ANODE_LOCATION_1: NORMAL
MDC_IDC_SET_LEADCHNL_LV_PACING_CATHODE_ELECTRODE_1: NORMAL
MDC_IDC_SET_LEADCHNL_LV_PACING_CATHODE_LOCATION_1: NORMAL
MDC_IDC_SET_LEADCHNL_LV_PACING_POLARITY: NORMAL
MDC_IDC_SET_LEADCHNL_RA_PACING_AMPLITUDE: 3 V
MDC_IDC_SET_LEADCHNL_RA_PACING_ANODE_ELECTRODE_1: NORMAL
MDC_IDC_SET_LEADCHNL_RA_PACING_ANODE_LOCATION_1: NORMAL
MDC_IDC_SET_LEADCHNL_RA_PACING_CATHODE_ELECTRODE_1: NORMAL
MDC_IDC_SET_LEADCHNL_RA_PACING_CATHODE_LOCATION_1: NORMAL
MDC_IDC_SET_LEADCHNL_RA_PACING_POLARITY: NORMAL
MDC_IDC_SET_LEADCHNL_RA_PACING_PULSEWIDTH: 0.4 MS
MDC_IDC_SET_LEADCHNL_RA_SENSING_ANODE_ELECTRODE_1: NORMAL
MDC_IDC_SET_LEADCHNL_RA_SENSING_ANODE_LOCATION_1: NORMAL
MDC_IDC_SET_LEADCHNL_RA_SENSING_CATHODE_ELECTRODE_1: NORMAL
MDC_IDC_SET_LEADCHNL_RA_SENSING_CATHODE_LOCATION_1: NORMAL
MDC_IDC_SET_LEADCHNL_RA_SENSING_POLARITY: NORMAL
MDC_IDC_SET_LEADCHNL_RA_SENSING_SENSITIVITY: 0.45 MV
MDC_IDC_SET_LEADCHNL_RV_PACING_AMPLITUDE: 2.5 V
MDC_IDC_SET_LEADCHNL_RV_PACING_ANODE_ELECTRODE_1: NORMAL
MDC_IDC_SET_LEADCHNL_RV_PACING_ANODE_LOCATION_1: NORMAL
MDC_IDC_SET_LEADCHNL_RV_PACING_CATHODE_ELECTRODE_1: NORMAL
MDC_IDC_SET_LEADCHNL_RV_PACING_CATHODE_LOCATION_1: NORMAL
MDC_IDC_SET_LEADCHNL_RV_PACING_POLARITY: NORMAL
MDC_IDC_SET_LEADCHNL_RV_PACING_PULSEWIDTH: 0.4 MS
MDC_IDC_SET_LEADCHNL_RV_SENSING_ANODE_ELECTRODE_1: NORMAL
MDC_IDC_SET_LEADCHNL_RV_SENSING_ANODE_LOCATION_1: NORMAL
MDC_IDC_SET_LEADCHNL_RV_SENSING_CATHODE_ELECTRODE_1: NORMAL
MDC_IDC_SET_LEADCHNL_RV_SENSING_CATHODE_LOCATION_1: NORMAL
MDC_IDC_SET_LEADCHNL_RV_SENSING_POLARITY: NORMAL
MDC_IDC_SET_LEADCHNL_RV_SENSING_SENSITIVITY: 0.3 MV
MDC_IDC_SET_ZONE_DETECTION_BEATS_DENOMINATOR: 40 {BEATS}
MDC_IDC_SET_ZONE_DETECTION_BEATS_NUMERATOR: 30 {BEATS}
MDC_IDC_SET_ZONE_DETECTION_INTERVAL: 240 MS
MDC_IDC_SET_ZONE_DETECTION_INTERVAL: 270 MS
MDC_IDC_SET_ZONE_DETECTION_INTERVAL: 360 MS
MDC_IDC_SET_ZONE_DETECTION_INTERVAL: 400 MS
MDC_IDC_SET_ZONE_DETECTION_INTERVAL: 430 MS
MDC_IDC_SET_ZONE_TYPE: NORMAL
MDC_IDC_STAT_AT_BURDEN_PERCENT: 0 %
MDC_IDC_STAT_AT_DTM_END: NORMAL
MDC_IDC_STAT_AT_DTM_START: NORMAL
MDC_IDC_STAT_BRADY_AP_VP_PERCENT: 0 %
MDC_IDC_STAT_BRADY_AP_VS_PERCENT: 0.49 %
MDC_IDC_STAT_BRADY_AS_VP_PERCENT: 0 %
MDC_IDC_STAT_BRADY_AS_VS_PERCENT: 99.51 %
MDC_IDC_STAT_BRADY_DTM_END: NORMAL
MDC_IDC_STAT_BRADY_DTM_START: NORMAL
MDC_IDC_STAT_BRADY_RA_PERCENT_PACED: 0.55 %
MDC_IDC_STAT_BRADY_RV_PERCENT_PACED: 0 %
MDC_IDC_STAT_CRT_DTM_END: NORMAL
MDC_IDC_STAT_CRT_DTM_START: NORMAL
MDC_IDC_STAT_CRT_LV_PERCENT_PACED: 0 %
MDC_IDC_STAT_CRT_PERCENT_PACED: 0 %
MDC_IDC_STAT_EPISODE_RECENT_COUNT: 0
MDC_IDC_STAT_EPISODE_RECENT_COUNT_DTM_END: NORMAL
MDC_IDC_STAT_EPISODE_RECENT_COUNT_DTM_START: NORMAL
MDC_IDC_STAT_EPISODE_TOTAL_COUNT: 0
MDC_IDC_STAT_EPISODE_TOTAL_COUNT: 0
MDC_IDC_STAT_EPISODE_TOTAL_COUNT: 189
MDC_IDC_STAT_EPISODE_TOTAL_COUNT: 19
MDC_IDC_STAT_EPISODE_TOTAL_COUNT: 19
MDC_IDC_STAT_EPISODE_TOTAL_COUNT: 2
MDC_IDC_STAT_EPISODE_TOTAL_COUNT: 292
MDC_IDC_STAT_EPISODE_TOTAL_COUNT_DTM_END: NORMAL
MDC_IDC_STAT_EPISODE_TOTAL_COUNT_DTM_START: NORMAL
MDC_IDC_STAT_EPISODE_TYPE: NORMAL
MDC_IDC_STAT_TACHYTHERAPY_ATP_DELIVERED_RECENT: 0
MDC_IDC_STAT_TACHYTHERAPY_ATP_DELIVERED_TOTAL: 14
MDC_IDC_STAT_TACHYTHERAPY_RECENT_DTM_END: NORMAL
MDC_IDC_STAT_TACHYTHERAPY_RECENT_DTM_START: NORMAL
MDC_IDC_STAT_TACHYTHERAPY_SHOCKS_ABORTED_RECENT: 0
MDC_IDC_STAT_TACHYTHERAPY_SHOCKS_ABORTED_TOTAL: 3
MDC_IDC_STAT_TACHYTHERAPY_SHOCKS_DELIVERED_RECENT: 0
MDC_IDC_STAT_TACHYTHERAPY_SHOCKS_DELIVERED_TOTAL: 4
MDC_IDC_STAT_TACHYTHERAPY_TOTAL_DTM_END: NORMAL
MDC_IDC_STAT_TACHYTHERAPY_TOTAL_DTM_START: NORMAL
PETH BLD-MCNC: NEGATIVE NG/ML

## 2020-09-16 PROCEDURE — 96365 THER/PROPH/DIAG IV INF INIT: CPT | Performed by: NURSE PRACTITIONER

## 2020-09-16 PROCEDURE — 96366 THER/PROPH/DIAG IV INF ADDON: CPT | Performed by: NURSE PRACTITIONER

## 2020-09-16 PROCEDURE — 99207 ZZC NO CHARGE NURSE ONLY: CPT

## 2020-09-16 RX ORDER — HEPARIN SODIUM,PORCINE 10 UNIT/ML
5 VIAL (ML) INTRAVENOUS
Status: CANCELLED | OUTPATIENT
Start: 2020-09-18

## 2020-09-16 RX ORDER — HEPARIN SODIUM (PORCINE) LOCK FLUSH IV SOLN 100 UNIT/ML 100 UNIT/ML
5 SOLUTION INTRAVENOUS
Status: CANCELLED | OUTPATIENT
Start: 2020-09-18

## 2020-09-16 RX ADMIN — Medication 250 ML: at 14:17

## 2020-09-16 ASSESSMENT — PAIN SCALES - GENERAL: PAINLEVEL: NO PAIN (0)

## 2020-09-16 NOTE — PROGRESS NOTES
Infusion Nursing Note:  Jim Willingham presents today for venofer #1/3.    Patient seen by provider today: No   present during visit today: Not Applicable.    Intravenous Access:  Peripheral IV placed.    Treatment Conditions:  See systems review flow sheet.      Post Infusion Assessment:  Patient tolerated infusion without incident.  Blood return noted pre and post infusion.  Site patent and intact, free from redness, edema or discomfort.  No evidence of extravasations.  Access discontinued per protocol.       Discharge Plan:   Patient discharged in stable condition accompanied by: self.  Departure Mode: Ambulatory.  Verbally reviewed next infusion on 9/23/20.    Nell Jama RN

## 2020-09-17 ENCOUNTER — APPOINTMENT (OUTPATIENT)
Dept: GENERAL RADIOLOGY | Facility: CLINIC | Age: 65
End: 2020-09-17
Attending: INTERNAL MEDICINE
Payer: COMMERCIAL

## 2020-09-17 ENCOUNTER — HOSPITAL ENCOUNTER (EMERGENCY)
Facility: CLINIC | Age: 65
Discharge: HOME | End: 2020-09-17
Attending: INTERNAL MEDICINE | Admitting: INTERNAL MEDICINE
Payer: COMMERCIAL

## 2020-09-17 ENCOUNTER — CARE COORDINATION (OUTPATIENT)
Dept: CARDIOLOGY | Facility: CLINIC | Age: 65
End: 2020-09-17

## 2020-09-17 VITALS
HEIGHT: 68 IN | TEMPERATURE: 98.4 F | DIASTOLIC BLOOD PRESSURE: 97 MMHG | RESPIRATION RATE: 18 BRPM | SYSTOLIC BLOOD PRESSURE: 111 MMHG | WEIGHT: 225 LBS | BODY MASS INDEX: 34.1 KG/M2 | OXYGEN SATURATION: 98 % | HEART RATE: 85 BPM

## 2020-09-17 DIAGNOSIS — Z20.828 EXPOSURE TO SARS-ASSOCIATED CORONAVIRUS: ICD-10-CM

## 2020-09-17 DIAGNOSIS — J18.9 PNEUMONIA OF BOTH LUNGS DUE TO INFECTIOUS ORGANISM, UNSPECIFIED PART OF LUNG: ICD-10-CM

## 2020-09-17 DIAGNOSIS — Z87.891 PERSONAL HISTORY OF TOBACCO USE, PRESENTING HAZARDS TO HEALTH: ICD-10-CM

## 2020-09-17 LAB
11OH-THC SERPL-MCNC: NEGATIVE NG/ML
ALBUMIN SERPL-MCNC: 3.6 G/DL (ref 3.4–5)
ALP SERPL-CCNC: 119 U/L (ref 40–150)
ALT SERPL W P-5'-P-CCNC: 59 U/L (ref 0–70)
AMPHETAMINES UR QL: NEGATIVE NG/ML
ANION GAP SERPL CALCULATED.3IONS-SCNC: 8 MMOL/L (ref 3–14)
APTT PPP: 51 SEC (ref 22–37)
AST SERPL W P-5'-P-CCNC: 37 U/L (ref 0–45)
BARBITURATES SERPLBLD QL: NEGATIVE UG/ML
BASOPHILS # BLD AUTO: 0.1 10E9/L (ref 0–0.2)
BASOPHILS NFR BLD AUTO: 0.8 %
BENZODIAZ SERPL QL: NEGATIVE NG/ML
BILIRUB SERPL-MCNC: 0.7 MG/DL (ref 0.2–1.3)
BUN SERPL-MCNC: 27 MG/DL (ref 7–30)
CALCIUM SERPL-MCNC: 8.6 MG/DL (ref 8.5–10.1)
CANNABIDIOL: NEGATIVE NG/ML
CANNABINOIDS BLD CFM-MCNC: NEGATIVE NG/ML
CANNABINOIDS SERPL QL CFM: POSITIVE
CANNABINOIDS SERPL QL: POSITIVE NG/ML
CANNABINOL: NEGATIVE NG/ML
CARBOXYTHC UR QL: 2.8 NG/ML
CHLORIDE SERPL-SCNC: 104 MMOL/L (ref 94–109)
CO2 SERPL-SCNC: 24 MMOL/L (ref 20–32)
COCAINE SERPL QL: NEGATIVE NG/ML
CREAT SERPL-MCNC: 1.49 MG/DL (ref 0.66–1.25)
DIFFERENTIAL METHOD BLD: ABNORMAL
EOSINOPHIL # BLD AUTO: 0 10E9/L (ref 0–0.7)
EOSINOPHIL NFR BLD AUTO: 0.2 %
ERYTHROCYTE [DISTWIDTH] IN BLOOD BY AUTOMATED COUNT: 15.9 % (ref 10–15)
ETHANOL BLD GC-MCNC: NEGATIVE GM/DL
GFR SERPL CREATININE-BSD FRML MDRD: 49 ML/MIN/{1.73_M2}
GLUCOSE SERPL-MCNC: 203 MG/DL (ref 70–99)
HCT VFR BLD AUTO: 33.6 % (ref 40–53)
HGB BLD-MCNC: 9.7 G/DL (ref 13.3–17.7)
IMM GRANULOCYTES # BLD: 0 10E9/L (ref 0–0.4)
IMM GRANULOCYTES NFR BLD: 0.6 %
INR PPP: 3.68 (ref 0.86–1.14)
LDH SERPL L TO P-CCNC: 386 U/L (ref 85–227)
LYMPHOCYTES # BLD AUTO: 0.3 10E9/L (ref 0.8–5.3)
LYMPHOCYTES NFR BLD AUTO: 5.3 %
MCH RBC QN AUTO: 26.4 PG (ref 26.5–33)
MCHC RBC AUTO-ENTMCNC: 28.9 G/DL (ref 31.5–36.5)
MCV RBC AUTO: 92 FL (ref 78–100)
METHADONE SERPL QL: NEGATIVE NG/ML
MONOCYTES # BLD AUTO: 0.3 10E9/L (ref 0–1.3)
MONOCYTES NFR BLD AUTO: 4.5 %
NEUTROPHILS # BLD AUTO: 5.6 10E9/L (ref 1.6–8.3)
NEUTROPHILS NFR BLD AUTO: 88.6 %
NRBC # BLD AUTO: 0 10*3/UL
NRBC BLD AUTO-RTO: 0 /100
OPIATES SERPL QL: NEGATIVE NG/ML
OXYCODONE SERPL QL: NEGATIVE NG/ML
PCP SERPL QL: NEGATIVE NG/ML
PLATELET # BLD AUTO: 264 10E9/L (ref 150–450)
POTASSIUM SERPL-SCNC: 4.2 MMOL/L (ref 3.4–5.3)
PROPOXYPH SERPL QL: NEGATIVE NG/ML
PROT SERPL-MCNC: 6.9 G/DL (ref 6.8–8.8)
RBC # BLD AUTO: 3.67 10E12/L (ref 4.4–5.9)
SODIUM SERPL-SCNC: 136 MMOL/L (ref 133–144)
WBC # BLD AUTO: 6.3 10E9/L (ref 4–11)

## 2020-09-17 PROCEDURE — 99284 EMERGENCY DEPT VISIT MOD MDM: CPT | Mod: Z6 | Performed by: INTERNAL MEDICINE

## 2020-09-17 PROCEDURE — C9803 HOPD COVID-19 SPEC COLLECT: HCPCS

## 2020-09-17 PROCEDURE — 87077 CULTURE AEROBIC IDENTIFY: CPT | Performed by: INTERNAL MEDICINE

## 2020-09-17 PROCEDURE — 87800 DETECT AGNT MULT DNA DIREC: CPT | Performed by: INTERNAL MEDICINE

## 2020-09-17 PROCEDURE — 71045 X-RAY EXAM CHEST 1 VIEW: CPT

## 2020-09-17 PROCEDURE — 83615 LACTATE (LD) (LDH) ENZYME: CPT | Performed by: INTERNAL MEDICINE

## 2020-09-17 PROCEDURE — 25000132 ZZH RX MED GY IP 250 OP 250 PS 637: Performed by: INTERNAL MEDICINE

## 2020-09-17 PROCEDURE — 85025 COMPLETE CBC W/AUTO DIFF WBC: CPT | Performed by: INTERNAL MEDICINE

## 2020-09-17 PROCEDURE — 85610 PROTHROMBIN TIME: CPT | Performed by: INTERNAL MEDICINE

## 2020-09-17 PROCEDURE — 87186 SC STD MICRODIL/AGAR DIL: CPT | Performed by: INTERNAL MEDICINE

## 2020-09-17 PROCEDURE — U0003 INFECTIOUS AGENT DETECTION BY NUCLEIC ACID (DNA OR RNA); SEVERE ACUTE RESPIRATORY SYNDROME CORONAVIRUS 2 (SARS-COV-2) (CORONAVIRUS DISEASE [COVID-19]), AMPLIFIED PROBE TECHNIQUE, MAKING USE OF HIGH THROUGHPUT TECHNOLOGIES AS DESCRIBED BY CMS-2020-01-R: HCPCS | Performed by: INTERNAL MEDICINE

## 2020-09-17 PROCEDURE — 99284 EMERGENCY DEPT VISIT MOD MDM: CPT | Mod: 25

## 2020-09-17 PROCEDURE — 85730 THROMBOPLASTIN TIME PARTIAL: CPT | Performed by: INTERNAL MEDICINE

## 2020-09-17 PROCEDURE — 87040 BLOOD CULTURE FOR BACTERIA: CPT | Performed by: INTERNAL MEDICINE

## 2020-09-17 PROCEDURE — 80053 COMPREHEN METABOLIC PANEL: CPT | Performed by: INTERNAL MEDICINE

## 2020-09-17 RX ORDER — LEVOFLOXACIN 750 MG/1
750 TABLET, FILM COATED ORAL DAILY
Qty: 4 TABLET | Refills: 0 | Status: ON HOLD | OUTPATIENT
Start: 2020-09-18 | End: 2020-09-27

## 2020-09-17 RX ORDER — LEVOFLOXACIN 750 MG/1
750 TABLET, FILM COATED ORAL ONCE
Status: COMPLETED | OUTPATIENT
Start: 2020-09-17 | End: 2020-09-17

## 2020-09-17 RX ADMIN — LEVOFLOXACIN 750 MG: 750 TABLET, FILM COATED ORAL at 17:53

## 2020-09-17 ASSESSMENT — ENCOUNTER SYMPTOMS
ABDOMINAL PAIN: 0
SHORTNESS OF BREATH: 1
DIARRHEA: 0
DYSURIA: 0
FEVER: 0
FATIGUE: 1
CHILLS: 1
DIAPHORESIS: 1
VOMITING: 0
COUGH: 0
NAUSEA: 0

## 2020-09-17 ASSESSMENT — MIFFLIN-ST. JEOR: SCORE: 1790.09

## 2020-09-17 NOTE — ED AVS SNAPSHOT
[facility], [place], Emergency Department  [address] [place] [phone]  Phone:  [phone]                                    Jim Willingham   MRN: [id number]    Department:  [facility], Emergency Department   Date of Visit:  9/17/2020           After Visit Summary Signature Page    I have received my discharge instructions, and my questions have been answered. I have discussed any challenges I see with this plan with the nurse or doctor.    ..........................................................................................................................................  Patient/Patient Representative Signature      ..........................................................................................................................................  Patient Representative Print Name and Relationship to Patient    ..................................................               ................................................  Date                                   Time    ..........................................................................................................................................  Reviewed by Signature/Title    ...................................................              ..............................................  Date                                               Time          22EPIC Rev 08/18

## 2020-09-17 NOTE — ED PROVIDER NOTES
"      [facility] ([facility])  September 17, 2020  History     Chief Complaint   Patient presents with     Generalized Body Aches     HPI  Jim Willingham is a 65 year old male with a medical history significant for CHF, paroxysmal VT, paroxysmal A. fib, CKD stage III, nonischemic cardiomyopathy (EF 20%), LVAD present, biventricular ICD in situ, long-term use of anticoagulants, WALTER, CECILIA, DM, H/O gastric bypass, depression, HTN, iron deficiency anemia, and uncomplicated asthma who presents the ED today complaining of generalized body aches.    Patient has had 3 days of generalized body aches and malaise, generalized weakness, feeling like \"a truck rolled over\" him.  Felt sweaty but did not have a fever when he took it at home.  No diarrhea, no blood in his bowel movements, no nausea or vomiting, no urinary symptoms, no cough, no rash.  Does note some shortness of breath at rest since he has felt this way.  His Bumex dose was increased and he actually has lost weight in the last 2 days.  HeartMate 2 set at speed 9400.  PI around 4.1.    Patient recently had an infusion performed at [facility] on 9/16/2020 for his iron deficiency anemia.    I have reviewed the Medications, Allergies, Past Medical and Surgical History, and Social History in the [employer] system.  PAST MEDICAL HISTORY:   Past Medical History:   Diagnosis Date     Benign essential hypertension 5/11/2017     Cardiomyopathy, unspecified (H) 5/8/2017     CKD (chronic kidney disease) stage 3, GFR 30-59 ml/min (H) 5/11/2017     Depression 5/11/2017     Diabetes mellitus (H) 5/11/2017     H/O gastric bypass 5/11/2017     ICD (implantable cardioverter-defibrillator), biventricular, in situ 5/11/2017     LVAD (left ventricular assist device) present (H)      NICM (nonischemic cardiomyopathy) (H)/ EF 20% 5/11/2017    ECHO: LVEDd. 7.66 cm, Restrictive pattern , Severe mitral valve regurgitation     CECILIA (obstructive sleep apnea) " 2017     Paroxysmal atrial fibrillation (H) 2017     Paroxysmal VT (H) 2017     Uncomplicated asthma      Vitamin B12 deficiency (non anemic) 2017       PAST SURGICAL HISTORY:   Past Surgical History:   Procedure Laterality Date     ANESTHESIA CARDIOVERSION N/A 2020    Procedure: ANESTHESIA, FOR CARDIOVERSION @1100;  Surgeon: GENERIC ANESTHESIA PROVIDER;  Location: [facility] OR     CV RIGHT HEART CATH N/A 2019    Procedure: CV RIGHT HEART CATH;  Surgeon: Renu Sears MD;  Location:  HEART CARDIAC CATH LAB     CV RIGHT HEART CATH N/A 2020    Procedure: CV RIGHT HEART CATH;  Surgeon: Nicola Seth MD;  Location:  HEART CARDIAC CATH LAB     GI SURGERY      Sylvester en Y     INSERT VENTRICULAR ASSIST DEVICE LEFT (HEARTMATE II) N/A 2017    Procedure: INSERT VENTRICULAR ASSIST DEVICE LEFT (HEARTMATE II);  Median Sternotomy Heartmate II Left Ventricular Assist Device Insertion on Pump Oxygenator;  Surgeon: Ronnie Quigley MD;  Location:  OR     ORTHOPEDIC SURGERY      right knee wired       Past medical history, past surgical history, medications, and allergies were reviewed with the patient. Additional pertinent items: None    FAMILY HISTORY:   Family History   Problem Relation Age of Onset     Cerebrovascular Disease Mother      Diabetes Mother      Hypertension Mother      Coronary Artery Disease Father      Diabetes Type 2  Father        SOCIAL HISTORY:   Social History     Tobacco Use     Smoking status: Former Smoker     Last attempt to quit:      Years since quittin.7     Smokeless tobacco: Never Used   Substance Use Topics     Alcohol use: No     Social history was reviewed with the patient. Additional pertinent items: None      Patient's Medications   New Prescriptions    LEVOFLOXACIN (LEVAQUIN) 750 MG TABLET    Take 1 tablet (750 mg) by mouth daily for 4 days   Previous Medications    ACETAMINOPHEN (TYLENOL) 325 MG TABLET    Take 650 mg by mouth every 6  hours as needed for mild pain    ALBUTEROL (ALBUTEROL) 108 (90 BASE) MCG/ACT INHALER    Inhale 2 puffs into the lungs every 4 hours as needed for shortness of breath / dyspnea or wheezing    ALLOPURINOL (ZYLOPRIM) 100 MG TABLET    Take 1 tablet (100 mg) by mouth daily    AMIODARONE (PACERONE) 200 MG TABLET    Take 400 mg by mouth daily    ASPIRIN 81 MG CHEWABLE TABLET    1 tablet (81 mg) by Oral or Feeding Tube route daily    BLOOD GLUCOSE MONITORING (ONE TOUCH ULTRA 2) METER DEVICE KIT    Use to test blood sugar four times daily or as directed.    BLOOD GLUCOSE VI TEST STRIP    Use to test blood sugar four times daily or as directed.    BUMETANIDE (BUMEX) 0.5 MG TABLET    Take 2 tablets (1 mg) by mouth 2 times daily    CITALOPRAM (CELEXA) 20 MG TABLET    Take 30 mg by mouth At Bedtime     CYANOCOBALAMIN (VITAMIN B12) 1000 MCG/ML INJECTION    Inject 1,000 mcg into the muscle every 30 days    FLUTICASONE-VILANTEROL (BREO ELLIPTA) 200-25 MCG/INH ORAL INHALER    Inhale 1 puff into the lungs daily    GABAPENTIN (NEURONTIN) 300 MG CAPSULE    Take 1 capsule (300 mg) by mouth twice daily and 2 capsules (600 mg) by mouth at bedtime daily.    HYDRALAZINE (APRESOLINE) 10 MG TABLET    Take 3 tablets (30 mg) by mouth 3 times daily    INSULIN GLARGINE (LANTUS SOLOSTAR) 100 UNIT/ML PEN    Inject 6 Units Subcutaneous At Bedtime    INSULIN LISPRO (HUMALOG KWIKPEN) 100 UNIT/ML (1 UNIT DIAL) KWIKPEN    Inject 1-7 Units Subcutaneous 4 times daily    INSULIN PEN NEEDLE (32G X 4 MM) 32G X 4 MM MISCELLANEOUS    Use four pen needles daily or as directed.    MONTELUKAST (SINGULAIR) 10 MG TABLET    Take 10 mg by mouth At Bedtime    PANTOPRAZOLE (PROTONIX) 40 MG EC TABLET    Take 1 tablet (40 mg) by mouth every morning    POTASSIUM CHLORIDE ER (KLOR-CON M) 10 MEQ CR TABLET    Take 2 tablets (20 mEq) by mouth daily    PREDNISONE (DELTASONE) 20 MG TABLET    Take 1 tablet (20 mg) by mouth daily    TRAMADOL (ULTRAM) 50 MG TABLET    Take 2  "tablets (100 mg) by mouth every 6 hours as needed for moderate pain or breakthrough pain    UMECLIDINIUM (INCRUSE ELLIPTA) 62.5 MCG/INH ORAL INHALER    Inhale 1 puff into the lungs daily    WARFARIN ANTICOAGULANT (COUMADIN ANTICOAGULANT) 5 MG TABLET    7.5 mg tonight, 5 mg on Saturday, 7.5 mg on Sunday. Repeat INR Monday.    ZINC SULFATE (ZINCATE) 220 (50 ZN) MG CAPSULE    Take 220 mg by mouth 2 times daily   Modified Medications    No medications on file   Discontinued Medications    No medications on file          Allergies   Allergen Reactions     Beer Shortness Of Breath     Grass Shortness Of Breath     Ace Inhibitors Cough     Dust Mites Other (See Comments)     Asthma     Mold Other (See Comments)     Asthma     Penicillins Other (See Comments)     Unknown - childhood exposure     Sulfa Drugs Other (See Comments) and Unknown     Unknown childhood reaction        Review of Systems   Constitutional: Positive for chills, diaphoresis and fatigue. Negative for fever.   Respiratory: Positive for shortness of breath. Negative for cough.    Cardiovascular: Negative for chest pain.   Gastrointestinal: Negative for abdominal pain, diarrhea, nausea and vomiting.   Genitourinary: Negative for dysuria.   All other systems reviewed and are negative.    A complete review of systems was performed with pertinent positives and negatives noted in the HPI, and all other systems negative.    Physical Exam   BP: 102/88  Pulse: 75  Temp: 98.4  F (36.9  C)  Resp: 18  Height: 172.7 cm (5' 8\")  Weight: 102.1 kg (225 lb)  SpO2: 96 %      Physical Exam  Vitals signs and nursing note reviewed.   Constitutional:       General: He is not in acute distress.     Appearance: He is normal weight. He is not ill-appearing, toxic-appearing or diaphoretic.   HENT:      Mouth/Throat:      Mouth: Mucous membranes are moist.   Eyes:      General: No scleral icterus.     Pupils: Pupils are equal, round, and reactive to light.   Neck:      " Musculoskeletal: No muscular tenderness.   Cardiovascular:      Rate and Rhythm: Normal rate and regular rhythm.   Pulmonary:      Effort: Pulmonary effort is normal. No respiratory distress.      Breath sounds: Normal breath sounds. No stridor. No wheezing or rales.   Abdominal:      Palpations: Abdomen is soft.      Tenderness: There is no abdominal tenderness. There is no guarding or rebound.   Skin:     General: Skin is warm and dry.   Neurological:      General: No focal deficit present.      Mental Status: He is alert.   Psychiatric:         Mood and Affect: Mood normal.         ED Course        Procedures           Results for orders placed or performed during the hospital encounter of 09/17/20 (from the past 24 hour(s))   Comprehensive metabolic panel   Result Value Ref Range    Sodium 136 133 - 144 mmol/L    Potassium 4.2 3.4 - 5.3 mmol/L    Chloride 104 94 - 109 mmol/L    Carbon Dioxide 24 20 - 32 mmol/L    Anion Gap 8 3 - 14 mmol/L    Glucose 203 (H) 70 - 99 mg/dL    Urea Nitrogen 27 7 - 30 mg/dL    Creatinine 1.49 (H) 0.66 - 1.25 mg/dL    GFR Estimate 49 (L) >60 mL/min/[1.73_m2]    GFR Estimate If Black 57 (L) >60 mL/min/[1.73_m2]    Calcium 8.6 8.5 - 10.1 mg/dL    Bilirubin Total 0.7 0.2 - 1.3 mg/dL    Albumin 3.6 3.4 - 5.0 g/dL    Protein Total 6.9 6.8 - 8.8 g/dL    Alkaline Phosphatase 119 40 - 150 U/L    ALT 59 0 - 70 U/L    AST 37 0 - 45 U/L   CBC with platelets differential   Result Value Ref Range    WBC 6.3 4.0 - 11.0 10e9/L    RBC Count 3.67 (L) 4.4 - 5.9 10e12/L    Hemoglobin 9.7 (L) 13.3 - 17.7 g/dL    Hematocrit 33.6 (L) 40.0 - 53.0 %    MCV 92 78 - 100 fl    MCH 26.4 (L) 26.5 - 33.0 pg    MCHC 28.9 (L) 31.5 - 36.5 g/dL    RDW 15.9 (H) 10.0 - 15.0 %    Platelet Count 264 150 - 450 10e9/L    Diff Method Automated Method     % Neutrophils 88.6 %    % Lymphocytes 5.3 %    % Monocytes 4.5 %    % Eosinophils 0.2 %    % Basophils 0.8 %    % Immature Granulocytes 0.6 %    Nucleated RBCs 0 0 /100     Absolute Neutrophil 5.6 1.6 - 8.3 10e9/L    Absolute Lymphocytes 0.3 (L) 0.8 - 5.3 10e9/L    Absolute Monocytes 0.3 0.0 - 1.3 10e9/L    Absolute Eosinophils 0.0 0.0 - 0.7 10e9/L    Absolute Basophils 0.1 0.0 - 0.2 10e9/L    Abs Immature Granulocytes 0.0 0 - 0.4 10e9/L    Absolute Nucleated RBC 0.0    INR   Result Value Ref Range    INR 3.68 (H) 0.86 - 1.14   Partial thromboplastin time   Result Value Ref Range    PTT 51 (H) 22 - 37 sec   Lactate Dehydrogenase   Result Value Ref Range    Lactate Dehydrogenase 386 (H) 85 - 227 U/L   XR Chest Port 1 View    Narrative    XR CHEST PORT 1 VW  9/17/2020 4:43 PM      HISTORY: dyspnea    COMPARISON: CT chest dated 5/20/2020    FINDINGS: AP chest radiograph. Left chest wall implantable cardiac  device. LVAD. Sternotomy wires. Bilateral perihilar interstitial  streaky opacities. No pneumothorax or pleural effusion. No acute  osseous or upper abdominal abnormality.      Impression    IMPRESSION: Findings suggesting pulmonary edema and/or perihilar  infection.     Medications   levofloxacin (LEVAQUIN) tablet 750 mg (750 mg Oral Given 9/17/20 1753)             Assessments & Plan (with Medical Decision Making)   Well-appearing patient who presents to the ER for generalized malaise.  Patient is an LVAD patient with HeartMate 2 and followed by Dr. Montgomery and her team.  Throughout ED stay patient was alert and interactive and in good humor.  Afebrile and nontoxic-appearing.  Hemodynamically stable.  Able to ambulate without difficulty.  Good SaO2 on room air.  In no acute distress and without increased work of breathing.  Clinically euvolemic as well as the patient notes that he lost 5 pounds in the last 36 hours with his increased Bumex dose.  Chest x-ray shows possible pulmonary congestion versus possible bilateral perihilar infiltrates.  Clinical scenario is not consistent with volume overload.  Was given 1 dose of levofloxacin here in the ER p.o.  Both with heart failure team  who agrees that the patient can be discharged home with p.o. antibiotics and close follow-up as an outpatient.  Patient agrees with this plan and he would actually rather be discharged home because he has a small child to care for.  He understand strict return precautions to the ER.  His questions were answered.    I have reviewed the nursing notes.    I have reviewed the findings, diagnosis, plan and need for follow up with the patient.    New Prescriptions    LEVOFLOXACIN (LEVAQUIN) 750 MG TABLET    Take 1 tablet (750 mg) by mouth daily for 4 days       Final diagnoses:   Pneumonia of both lungs due to infectious organism, unspecified part of lung       9/17/2020   [facility], [place], EMERGENCY DEPARTMENT     Angelina Ramos MD  09/17/20 [id number]

## 2020-09-17 NOTE — ED NOTES
Bed: ED11  Expected date: 9/17/20  Expected time: 3:23 PM  Means of arrival: Ambulance  Comments:  Timmy Vera

## 2020-09-17 NOTE — PROGRESS NOTES
"Patient called the VAD Coordinator on call reporting that he feels \"horrible, like he's been hit by a truck.\" Patient reports shortness of breath at rest, light headedness, diaphoretic, and throbbing headache. Patent took 1000mg Tylenol x2 today, and reports that it has not helped at all. Patient reports his weight at 225 lbs and denies swelling. VAD numbers read Flow 7.5, Speed 9400, PI 3.5, Power 7.0.   Patient was advised to come to the ED to be evaluated. Patient was agreeable, and states he would call an ambulance as he did not have a reliable ride available.   "

## 2020-09-17 NOTE — ED NOTES
Bed: IN03  Expected date:   Expected time:   Means of arrival:   Comments:  Jim soto  heartmate 2    Dizzy, short of breath, diaphoretic, headache, does not feel well. Coming by ambulance?

## 2020-09-17 NOTE — DISCHARGE INSTRUCTIONS
Please follow-up with Dr. Montgomery and her team in the next several days.  Please follow-up with your primary provider in the next 1 to 2 days.  Please take the prescribed antibiotics.  You got your first dose here in the ER.  You will need 4 more doses daily for 4 days starting tomorrow.  Levofloxacin may affect her INR levels so please get your INR tested in the next 3 to 5 days.  If you develop fever, bleeding, fainting, or any other concerning symptoms please return to the ER.

## 2020-09-18 ENCOUNTER — TELEPHONE (OUTPATIENT)
Dept: ANTICOAGULATION | Facility: CLINIC | Age: 65
End: 2020-09-18

## 2020-09-18 ENCOUNTER — APPOINTMENT (OUTPATIENT)
Dept: GENERAL RADIOLOGY | Facility: CLINIC | Age: 65
DRG: 871 | End: 2020-09-18
Attending: EMERGENCY MEDICINE
Payer: COMMERCIAL

## 2020-09-18 ENCOUNTER — HOSPITAL ENCOUNTER (INPATIENT)
Facility: CLINIC | Age: 65
LOS: 8 days | Discharge: HOME | DRG: 871 | End: 2020-09-27
Attending: EMERGENCY MEDICINE | Admitting: INTERNAL MEDICINE
Payer: COMMERCIAL

## 2020-09-18 DIAGNOSIS — E08.22 DIABETES MELLITUS DUE TO UNDERLYING CONDITION WITH CHRONIC KIDNEY DISEASE, WITHOUT LONG-TERM CURRENT USE OF INSULIN, UNSPECIFIED CKD STAGE (H): ICD-10-CM

## 2020-09-18 DIAGNOSIS — Z95.811 LVAD (LEFT VENTRICULAR ASSIST DEVICE) PRESENT (H): ICD-10-CM

## 2020-09-18 DIAGNOSIS — I50.22 CHRONIC SYSTOLIC CONGESTIVE HEART FAILURE (H): ICD-10-CM

## 2020-09-18 DIAGNOSIS — R78.81 POSITIVE BLOOD CULTURE: ICD-10-CM

## 2020-09-18 DIAGNOSIS — R78.81 BACTEREMIA: Primary | ICD-10-CM

## 2020-09-18 DIAGNOSIS — Z79.01 LONG TERM CURRENT USE OF ANTICOAGULANT THERAPY: ICD-10-CM

## 2020-09-18 DIAGNOSIS — F32.A DEPRESSION, UNSPECIFIED DEPRESSION TYPE: ICD-10-CM

## 2020-09-18 LAB
ALBUMIN SERPL-MCNC: 3.4 G/DL (ref 3.4–5)
ALP SERPL-CCNC: 97 U/L (ref 40–150)
ALT SERPL W P-5'-P-CCNC: 49 U/L (ref 0–70)
ANION GAP SERPL CALCULATED.3IONS-SCNC: 6 MMOL/L (ref 3–14)
AST SERPL W P-5'-P-CCNC: 30 U/L (ref 0–45)
BASOPHILS # BLD AUTO: 0 10E9/L (ref 0–0.2)
BASOPHILS NFR BLD AUTO: 0.4 %
BILIRUB SERPL-MCNC: 0.6 MG/DL (ref 0.2–1.3)
BUN SERPL-MCNC: 22 MG/DL (ref 7–30)
CALCIUM SERPL-MCNC: 8.5 MG/DL (ref 8.5–10.1)
CHLORIDE SERPL-SCNC: 106 MMOL/L (ref 94–109)
CO2 SERPL-SCNC: 26 MMOL/L (ref 20–32)
COTININE SERPL-MCNC: <1 NG/ML
CREAT SERPL-MCNC: 1.64 MG/DL (ref 0.66–1.25)
DIFFERENTIAL METHOD BLD: ABNORMAL
EOSINOPHIL # BLD AUTO: 0 10E9/L (ref 0–0.7)
EOSINOPHIL NFR BLD AUTO: 0.2 %
ERYTHROCYTE [DISTWIDTH] IN BLOOD BY AUTOMATED COUNT: 16.4 % (ref 10–15)
GFR SERPL CREATININE-BSD FRML MDRD: 44 ML/MIN/{1.73_M2}
GLUCOSE SERPL-MCNC: 169 MG/DL (ref 70–99)
HCT VFR BLD AUTO: 32 % (ref 40–53)
HGB BLD-MCNC: 9.3 G/DL (ref 13.3–17.7)
IMM GRANULOCYTES # BLD: 0.1 10E9/L (ref 0–0.4)
IMM GRANULOCYTES NFR BLD: 0.9 %
LABORATORY COMMENT REPORT: NORMAL
LYMPHOCYTES # BLD AUTO: 0.5 10E9/L (ref 0.8–5.3)
LYMPHOCYTES NFR BLD AUTO: 8.1 %
MCH RBC QN AUTO: 26.6 PG (ref 26.5–33)
MCHC RBC AUTO-ENTMCNC: 29.1 G/DL (ref 31.5–36.5)
MCV RBC AUTO: 91 FL (ref 78–100)
MONOCYTES # BLD AUTO: 0.3 10E9/L (ref 0–1.3)
MONOCYTES NFR BLD AUTO: 5.5 %
NEUTROPHILS # BLD AUTO: 4.8 10E9/L (ref 1.6–8.3)
NEUTROPHILS NFR BLD AUTO: 84.9 %
NICOTINE SERPL-MCNC: <1 NG/ML
NRBC # BLD AUTO: 0 10*3/UL
NRBC BLD AUTO-RTO: 0 /100
PLATELET # BLD AUTO: 256 10E9/L (ref 150–450)
POTASSIUM SERPL-SCNC: 4.2 MMOL/L (ref 3.4–5.3)
PROT SERPL-MCNC: 6.6 G/DL (ref 6.8–8.8)
RBC # BLD AUTO: 3.5 10E12/L (ref 4.4–5.9)
SARS-COV-2 RNA SPEC QL NAA+PROBE: NEGATIVE
SARS-COV-2 RNA SPEC QL NAA+PROBE: NORMAL
SODIUM SERPL-SCNC: 137 MMOL/L (ref 133–144)
SPECIMEN SOURCE: NORMAL
SPECIMEN SOURCE: NORMAL
WBC # BLD AUTO: 5.6 10E9/L (ref 4–11)

## 2020-09-18 PROCEDURE — 85025 COMPLETE CBC W/AUTO DIFF WBC: CPT | Performed by: EMERGENCY MEDICINE

## 2020-09-18 PROCEDURE — 80053 COMPREHEN METABOLIC PANEL: CPT | Performed by: EMERGENCY MEDICINE

## 2020-09-18 PROCEDURE — 87077 CULTURE AEROBIC IDENTIFY: CPT | Performed by: EMERGENCY MEDICINE

## 2020-09-18 PROCEDURE — 87040 BLOOD CULTURE FOR BACTERIA: CPT | Performed by: EMERGENCY MEDICINE

## 2020-09-18 PROCEDURE — 25800030 ZZH RX IP 258 OP 636: Performed by: EMERGENCY MEDICINE

## 2020-09-18 PROCEDURE — 96366 THER/PROPH/DIAG IV INF ADDON: CPT | Performed by: EMERGENCY MEDICINE

## 2020-09-18 PROCEDURE — 96365 THER/PROPH/DIAG IV INF INIT: CPT | Performed by: EMERGENCY MEDICINE

## 2020-09-18 PROCEDURE — 99285 EMERGENCY DEPT VISIT HI MDM: CPT | Mod: 25 | Performed by: EMERGENCY MEDICINE

## 2020-09-18 PROCEDURE — 25000128 H RX IP 250 OP 636: Performed by: EMERGENCY MEDICINE

## 2020-09-18 PROCEDURE — 99291 CRITICAL CARE FIRST HOUR: CPT | Mod: Z6 | Performed by: EMERGENCY MEDICINE

## 2020-09-18 PROCEDURE — 71045 X-RAY EXAM CHEST 1 VIEW: CPT

## 2020-09-18 RX ORDER — PIPERACILLIN SODIUM, TAZOBACTAM SODIUM 4; .5 G/20ML; G/20ML
4.5 INJECTION, POWDER, LYOPHILIZED, FOR SOLUTION INTRAVENOUS ONCE
Status: COMPLETED | OUTPATIENT
Start: 2020-09-18 | End: 2020-09-18

## 2020-09-18 RX ADMIN — VANCOMYCIN HYDROCHLORIDE 2500 MG: 1 INJECTION, POWDER, LYOPHILIZED, FOR SOLUTION INTRAVENOUS at 21:35

## 2020-09-18 RX ADMIN — PIPERACILLIN SODIUM AND TAZOBACTAM SODIUM 4.5 G: 4; .5 INJECTION, POWDER, LYOPHILIZED, FOR SOLUTION INTRAVENOUS at 20:57

## 2020-09-18 ASSESSMENT — ENCOUNTER SYMPTOMS
DIZZINESS: 0
PHOTOPHOBIA: 0
SHORTNESS OF BREATH: 0
COUGH: 0

## 2020-09-18 NOTE — ED TRIAGE NOTES
Pt here yesterday and sent home with abx for pneumonia. Pt was called back today due to infection in bloodstream. Pt has an LVAD since June 2017. Has had no relief since his presentation yesterday. Feels overall weakness and general malaise.

## 2020-09-18 NOTE — ED NOTES
Micro lab called us and reported blood culture pos for [facility]. Pt was called back regarding this result and recommended to come back to ED knowing pt is a LVAD pt. Pt is making arrangement and come back to ED, probable admission.     Evans Fang MD  09/18/20 [id number]

## 2020-09-18 NOTE — RESULT ENCOUNTER NOTE
Critical value note regarding Positive blood culture    Information below copied and pasted below from ED Triage Notes   ED Nurse or ED Provider:  Evans Fang MD  Date and Time of call:  09/18/20 6984  Response/comment:  Micro lab called us and reported blood culture pos for [facility]. Pt was called back regarding this result and recommended to come back to ED knowing pt is a LVAD pt. Pt is making arrangement and come back to ED, probable admission.

## 2020-09-18 NOTE — TELEPHONE ENCOUNTER
ANTICOAGULATION  MANAGEMENT: Discharge Review    [place] Willing[place] chart reviewed for anticoagulation continuity of care    Emergency room visit on 9/17 for generalized body aches, malaise, weakness, diaphoresis, dizziness, and slight shortness of breath..    Discharge disposition: Home    Results:    Recent labs: (last 7 days)     09/15/20  1040 09/17/20  1554   INR 3.74* 3.68*     Anticoagulation inpatient management:     not applicable     Anticoagulation discharge instructions:     Warfarin dosing: decrease dose to 5mg daily until 9/22   Bridging: No   INR goal change: No      Medication changes affecting anticoagulation: Yes: levofloxacin X 4 days    Additional factors affecting anticoagulation: No    Plan     Recommend to adjust dose to 5mg daily until INR on 9/22.    Left a detailed message for pt    Anticoagulation Calendar updated     ANTICOAGULATION  MANAGEMENT     Interacting Medication Review    Interacting medication(s): Levofloxacin (Levaquin) with warfarin.    Duration: 3 days (9/17 to actually 4 days-9/21)    New medication?: Yes, interaction may increase INR and risk of bleeding       PLAN     Slight dose adjustment due to supratherapeutic INR and new medication.    Left a detailed message for Jim    Anticoagulation Calendar updated    REGINALD Rodas RN

## 2020-09-18 NOTE — ED PROVIDER NOTES
"ED Provider Note  [facility]      History     Chief Complaint   Patient presents with     Blood Infection     HPI  Jim Willingham is a 65 year old male with a medical history significant for CHF, paroxysmal VT, paroxysmal atrial fibrillation (on warfarin), CKD stage III, nonischemic cardiomyopathy (EF 20%) s/p LVAD and biventricular ICD, CECILIA, hypertension, asthma, diabetes mellitus and is s/p gastric bypass.  Per review of patient's chart, patient was seen in the Emergency Department here yesterday for myalgias, malaise, generalized weakness, diaphoresis and some mild shortness of breath.  Patient had a chest x-ray done that showed possible pulmonary congestion versus possible bilateral perihilar infiltrates; patient's clinical scenario was not consistent with volume overload.  He was given 1 dose of levofloxacin in the Emergency Department.  The physician spoke with the patient's cardiology team and they felt that the patient could be discharged home on oral antibiotics.  Patient was discharged with a prescription for levofloxacin 750 mg daily for 4 days.    Patient returns to the Emergency Department today after being contacted after his blood cultures returned positive for gram positive cocci in clusters. States he feels a little bit better, \"Like I got ran over by a bus rather than a semi-truck\". Denies cough, fever, abdominal pain, vomiting or diarrhea.    Past Medical History  Past Medical History:   Diagnosis Date     Benign essential hypertension 5/11/2017     Cardiomyopathy, unspecified (H) 5/8/2017     CKD (chronic kidney disease) stage 3, GFR 30-59 ml/min (H) 5/11/2017     Depression 5/11/2017     Diabetes mellitus (H) 5/11/2017     H/O gastric bypass 5/11/2017     ICD (implantable cardioverter-defibrillator), biventricular, in situ 5/11/2017     LVAD (left ventricular assist device) present (H)      NICM (nonischemic cardiomyopathy) (H)/ EF 20% 5/11/2017    ECHO: LVEDd. 7.66 " cm, Restrictive pattern , Severe mitral valve regurgitation     CECILIA (obstructive sleep apnea) 5/11/2017     Paroxysmal atrial fibrillation (H) 5/11/2017     Paroxysmal VT (H) 5/11/2017     Uncomplicated asthma      Vitamin B12 deficiency (non anemic) 5/11/2017     Past Surgical History:   Procedure Laterality Date     ANESTHESIA CARDIOVERSION N/A 5/11/2020    Procedure: ANESTHESIA, FOR CARDIOVERSION @1100;  Surgeon: GENERIC ANESTHESIA PROVIDER;  Location: [facility] OR     CV RIGHT HEART CATH N/A 7/24/2019    Procedure: CV RIGHT HEART CATH;  Surgeon: Renu Sears MD;  Location:  HEART CARDIAC CATH LAB     CV RIGHT HEART CATH N/A 8/5/2020    Procedure: CV RIGHT HEART CATH;  Surgeon: Nicola Seth MD;  Location:  HEART CARDIAC CATH LAB     GI SURGERY  2003    [place] [facility]     INSERT VENTRICULAR ASSIST DEVICE LEFT (HEARTMATE II) N/A 6/19/2017    Procedure: INSERT VENTRICULAR ASSIST DEVICE LEFT (HEARTMATE II);  Median Sternotomy Heartmate II Left Ventricular Assist Device Insertion on Pump Oxygenator;  Surgeon: Ronnie Quigley MD;  Location: [facility] OR     ORTHOPEDIC SURGERY  1994    right knee wired     acetaminophen (TYLENOL) 325 MG tablet  albuterol (ALBUTEROL) 108 (90 BASE) MCG/ACT Inhaler  allopurinol (ZYLOPRIM) 100 MG tablet  amiodarone (PACERONE) 200 MG tablet  aspirin 81 MG chewable tablet  blood glucose monitoring (ONE TOUCH ULTRA 2) meter device kit  blood glucose VI test strip  bumetanide (BUMEX) 0.5 MG tablet  citalopram (CELEXA) 20 MG tablet  cyanocobalamin (VITAMIN B12) 1000 MCG/ML injection  fluticasone-vilanterol (BREO ELLIPTA) 200-25 MCG/INH oral inhaler  gabapentin (NEURONTIN) 300 MG capsule  hydrALAZINE (APRESOLINE) 10 MG tablet  insulin glargine (LANTUS SOLOSTAR) 100 UNIT/ML pen  insulin lispro (HUMALOG KWIKPEN) 100 UNIT/ML (1 unit dial) KWIKPEN  insulin pen needle (32G X 4 MM) 32G X 4 MM miscellaneous  levofloxacin (LEVAQUIN) 750 MG tablet  montelukast (SINGULAIR) 10 MG tablet  pantoprazole (PROTONIX) 40  MG EC tablet  potassium chloride ER (KLOR-CON M) 10 MEQ CR tablet  predniSONE (DELTASONE) 20 MG tablet  traMADol (ULTRAM) 50 MG tablet  umeclidinium (INCRUSE ELLIPTA) 62.5 MCG/INH oral inhaler  warfarin ANTICOAGULANT (COUMADIN ANTICOAGULANT) 5 MG tablet  zinc sulfate (ZINCATE) 220 (50 Zn) MG capsule      Allergies   Allergen Reactions     Beer Shortness Of Breath     Grass Shortness Of Breath     Ace Inhibitors Cough     Dust Mites Other (See Comments)     Asthma     Mold Other (See Comments)     Asthma     Penicillins Other (See Comments)     Unknown - childhood exposure     Sulfa Drugs Other (See Comments) and Unknown     Unknown childhood reaction     Family History  Family History   Problem Relation Age of Onset     Cerebrovascular Disease Mother      Diabetes Mother      Hypertension Mother      Coronary Artery Disease Father      Diabetes Type 2  Father      Social History   Social History     Tobacco Use     Smoking status: Former Smoker     Last attempt to quit:      Years since quittin.7     Smokeless tobacco: Never Used   Substance Use Topics     Alcohol use: No     Drug use: No      Past medical history, past surgical history, medications, allergies, family history, and social history were reviewed with the patient. No additional pertinent items.       Review of Systems   Eyes: Negative for photophobia.   Respiratory: Negative for cough and shortness of breath.    Neurological: Negative for dizziness.   All other systems reviewed and are negative.    A complete review of systems was performed with pertinent positives and negatives noted in the HPI, and all other systems negative.    Physical Exam   BP: (unable to obtain accurate reading due to LVAD)  Pulse: 80  Temp: 98.3  F (36.8  C)  Resp: 16  SpO2: 97 %  Physical Exam  Constitutional:       General: He is not in acute distress.     Appearance: Normal appearance.   HENT:      Head: Normocephalic and atraumatic.      Right Ear: External ear  normal.      Left Ear: External ear normal.      Nose: Nose normal. No rhinorrhea.   Eyes:      Conjunctiva/sclera: Conjunctivae normal.   Neck:      Musculoskeletal: No neck rigidity or muscular tenderness.   Cardiovascular:      Pulses: Normal pulses.   Pulmonary:      Effort: Pulmonary effort is normal. No respiratory distress.      Breath sounds: Normal breath sounds.   Abdominal:      General: There is no distension.      Palpations: Abdomen is soft.      Tenderness: There is no abdominal tenderness.   Musculoskeletal:         General: No deformity or signs of injury.   Skin:     General: Skin is warm and dry.      Capillary Refill: Capillary refill takes less than 2 seconds.   Neurological:      General: No focal deficit present.      Mental Status: He is alert. Mental status is at baseline.   Psychiatric:         Mood and Affect: Mood normal.         Behavior: Behavior normal.          ED Course      Procedures                Critical Care Addendum    My initial assessment, based on my review of vital signs, focused history, physical exam and review of cardiac rhythm monitor, established that Jim Willingham has suspicion for sepsis and need for evaluation and early goal-directed therapy, which requires immediate intervention, and therefore he is critically ill.     After the initial assessment, the care team initiated multiple lab tests, initiated medication therapy with vanc/zosyn and consulted with cardiothoracic surgery to provide stabilization care. Due to the critical nature of this patient, I reassessed vital signs, physical exam and mental status multiple times prior to his disposition.     Time also spent performing reviewing test results, discussion with consultants and coordination of care.     Critical care time (excluding teaching time and procedures): 34 minutes.            Results for orders placed or performed during the hospital encounter of 09/18/20   CBC with platelets differential      Status: Abnormal   Result Value Ref Range    WBC 5.6 4.0 - 11.0 10e9/L    RBC Count 3.50 (L) 4.4 - 5.9 10e12/L    Hemoglobin 9.3 (L) 13.3 - 17.7 g/dL    Hematocrit 32.0 (L) 40.0 - 53.0 %    MCV 91 78 - 100 fl    MCH 26.6 26.5 - 33.0 pg    MCHC 29.1 (L) 31.5 - 36.5 g/dL    RDW 16.4 (H) 10.0 - 15.0 %    Platelet Count 256 150 - 450 10e9/L    Diff Method Automated Method     % Neutrophils 84.9 %    % Lymphocytes 8.1 %    % Monocytes 5.5 %    % Eosinophils 0.2 %    % Basophils 0.4 %    % Immature Granulocytes 0.9 %    Nucleated RBCs 0 0 /100    Absolute Neutrophil 4.8 1.6 - 8.3 10e9/L    Absolute Lymphocytes 0.5 (L) 0.8 - 5.3 10e9/L    Absolute Monocytes 0.3 0.0 - 1.3 10e9/L    Absolute Eosinophils 0.0 0.0 - 0.7 10e9/L    Absolute Basophils 0.0 0.0 - 0.2 10e9/L    Abs Immature Granulocytes 0.1 0 - 0.4 10e9/L    Absolute Nucleated RBC 0.0    Comprehensive metabolic panel     Status: Abnormal   Result Value Ref Range    Sodium 137 133 - 144 mmol/L    Potassium 4.2 3.4 - 5.3 mmol/L    Chloride 106 94 - 109 mmol/L    Carbon Dioxide 26 20 - 32 mmol/L    Anion Gap 6 3 - 14 mmol/L    Glucose 169 (H) 70 - 99 mg/dL    Urea Nitrogen 22 7 - 30 mg/dL    Creatinine 1.64 (H) 0.66 - 1.25 mg/dL    GFR Estimate 44 (L) >60 mL/min/[1.73_m2]    GFR Estimate If Black 51 (L) >60 mL/min/[1.73_m2]    Calcium 8.5 8.5 - 10.1 mg/dL    Bilirubin Total 0.6 0.2 - 1.3 mg/dL    Albumin 3.4 3.4 - 5.0 g/dL    Protein Total 6.6 (L) 6.8 - 8.8 g/dL    Alkaline Phosphatase 97 40 - 150 U/L    ALT 49 0 - 70 U/L    AST 30 0 - 45 U/L   Blood culture     Status: None (Preliminary result)    Specimen: Arm, Right; Blood    Right Arm   Result Value Ref Range    Specimen Description Blood Right Arm     Culture Micro No growth after 1 hour    Blood culture     Status: None (Preliminary result)    Specimen: Arm, Right; Blood    Right Arm   Result Value Ref Range    Specimen Description Blood Right Arm     Culture Micro PENDING      Medications   vancomycin (VANCOCIN)  2,500 mg in sodium chloride 0.9 % 500 mL intermittent infusion (2,500 mg Intravenous New Bag 9/18/20 2135)   vancomycin 1500 mg in 0.9% NaCl 250 ml intermittent infusion 1,500 mg (has no administration in time range)   piperacillin-tazobactam (ZOSYN) 4.5 g vial to attach to  mL bag (0 g Intravenous Stopped 9/18/20 2141)        Assessments & Plan (with Medical Decision Making)     65-year-old male here with continued body aches and headache.  Had a positive blood culture from yesterday, therefore will start broad-spectrum antibiotics and will admit to cards 2.  While patient is well-appearing, the risk for this infection infecting his LVAD is high therefore needs admission and emergent antibiotics.  Blood pressure is normal therefore will not start IV fluids.    I have reviewed the nursing notes. I have reviewed the findings, diagnosis, plan and need for follow up with the patient.    New Prescriptions    No medications on file       Final diagnoses:   Positive blood culture       --    [facility], [place], EMERGENCY DEPARTMENT  9/18/2020     Aiden Espinal MD  09/18/20 [id number]       Aiden Espinal MD  09/18/20 [id number]

## 2020-09-19 ENCOUNTER — APPOINTMENT (OUTPATIENT)
Dept: CARDIOLOGY | Facility: CLINIC | Age: 65
DRG: 871 | End: 2020-09-19
Payer: COMMERCIAL

## 2020-09-19 PROBLEM — R78.81 BACTEREMIA: Status: ACTIVE | Noted: 2020-09-19

## 2020-09-19 LAB
ALBUMIN SERPL-MCNC: 3.4 G/DL (ref 3.4–5)
ALP SERPL-CCNC: 92 U/L (ref 40–150)
ALT SERPL W P-5'-P-CCNC: 45 U/L (ref 0–70)
ANION GAP SERPL CALCULATED.3IONS-SCNC: 8 MMOL/L (ref 3–14)
AST SERPL W P-5'-P-CCNC: 29 U/L (ref 0–45)
BASOPHILS # BLD AUTO: 0.1 10E9/L (ref 0–0.2)
BASOPHILS NFR BLD AUTO: 0.7 %
BILIRUB SERPL-MCNC: 0.7 MG/DL (ref 0.2–1.3)
BUN SERPL-MCNC: 21 MG/DL (ref 7–30)
CALCIUM SERPL-MCNC: 8.6 MG/DL (ref 8.5–10.1)
CHLORIDE SERPL-SCNC: 106 MMOL/L (ref 94–109)
CO2 SERPL-SCNC: 25 MMOL/L (ref 20–32)
CREAT SERPL-MCNC: 1.56 MG/DL (ref 0.66–1.25)
DIFFERENTIAL METHOD BLD: ABNORMAL
EOSINOPHIL # BLD AUTO: 0.1 10E9/L (ref 0–0.7)
EOSINOPHIL NFR BLD AUTO: 0.8 %
ERYTHROCYTE [DISTWIDTH] IN BLOOD BY AUTOMATED COUNT: 16.4 % (ref 10–15)
GFR SERPL CREATININE-BSD FRML MDRD: 46 ML/MIN/{1.73_M2}
GLUCOSE SERPL-MCNC: 94 MG/DL (ref 70–99)
HCT VFR BLD AUTO: 32.9 % (ref 40–53)
HGB BLD-MCNC: 9.5 G/DL (ref 13.3–17.7)
IMM GRANULOCYTES # BLD: 0.1 10E9/L (ref 0–0.4)
IMM GRANULOCYTES NFR BLD: 0.7 %
INR PPP: 4.09 (ref 0.86–1.14)
INTERPRETATION ECG - MUSE: NORMAL
LDH SERPL L TO P-CCNC: 358 U/L (ref 85–227)
LYMPHOCYTES # BLD AUTO: 1.5 10E9/L (ref 0.8–5.3)
LYMPHOCYTES NFR BLD AUTO: 21.1 %
MAGNESIUM SERPL-MCNC: 2.3 MG/DL (ref 1.6–2.3)
MCH RBC QN AUTO: 26.8 PG (ref 26.5–33)
MCHC RBC AUTO-ENTMCNC: 28.9 G/DL (ref 31.5–36.5)
MCV RBC AUTO: 93 FL (ref 78–100)
MONOCYTES # BLD AUTO: 0.9 10E9/L (ref 0–1.3)
MONOCYTES NFR BLD AUTO: 13 %
NEUTROPHILS # BLD AUTO: 4.5 10E9/L (ref 1.6–8.3)
NEUTROPHILS NFR BLD AUTO: 63.7 %
NRBC # BLD AUTO: 0 10*3/UL
NRBC BLD AUTO-RTO: 0 /100
PLATELET # BLD AUTO: 249 10E9/L (ref 150–450)
POTASSIUM SERPL-SCNC: 3.4 MMOL/L (ref 3.4–5.3)
PROT SERPL-MCNC: 6.7 G/DL (ref 6.8–8.8)
RBC # BLD AUTO: 3.54 10E12/L (ref 4.4–5.9)
SODIUM SERPL-SCNC: 138 MMOL/L (ref 133–144)
WBC # BLD AUTO: 7.1 10E9/L (ref 4–11)

## 2020-09-19 PROCEDURE — 36415 COLL VENOUS BLD VENIPUNCTURE: CPT | Performed by: STUDENT IN AN ORGANIZED HEALTH CARE EDUCATION/TRAINING PROGRAM

## 2020-09-19 PROCEDURE — 25000128 H RX IP 250 OP 636

## 2020-09-19 PROCEDURE — 99221 1ST HOSP IP/OBS SF/LOW 40: CPT | Mod: 25 | Performed by: INTERNAL MEDICINE

## 2020-09-19 PROCEDURE — 93750 INTERROGATION VAD IN PERSON: CPT | Performed by: INTERNAL MEDICINE

## 2020-09-19 PROCEDURE — 94640 AIRWAY INHALATION TREATMENT: CPT | Performed by: EMERGENCY MEDICINE

## 2020-09-19 PROCEDURE — 25000132 ZZH RX MED GY IP 250 OP 250 PS 637: Performed by: STUDENT IN AN ORGANIZED HEALTH CARE EDUCATION/TRAINING PROGRAM

## 2020-09-19 PROCEDURE — 25000131 ZZH RX MED GY IP 250 OP 636 PS 637

## 2020-09-19 PROCEDURE — 25800030 ZZH RX IP 258 OP 636

## 2020-09-19 PROCEDURE — 93306 TTE W/DOPPLER COMPLETE: CPT | Mod: 26 | Performed by: INTERNAL MEDICINE

## 2020-09-19 PROCEDURE — 87040 BLOOD CULTURE FOR BACTERIA: CPT | Performed by: STUDENT IN AN ORGANIZED HEALTH CARE EDUCATION/TRAINING PROGRAM

## 2020-09-19 PROCEDURE — 25000132 ZZH RX MED GY IP 250 OP 250 PS 637: Performed by: EMERGENCY MEDICINE

## 2020-09-19 PROCEDURE — 25000132 ZZH RX MED GY IP 250 OP 250 PS 637: Performed by: INTERNAL MEDICINE

## 2020-09-19 PROCEDURE — 85610 PROTHROMBIN TIME: CPT

## 2020-09-19 PROCEDURE — 80053 COMPREHEN METABOLIC PANEL: CPT

## 2020-09-19 PROCEDURE — 96372 THER/PROPH/DIAG INJ SC/IM: CPT | Performed by: EMERGENCY MEDICINE

## 2020-09-19 PROCEDURE — 93306 TTE W/DOPPLER COMPLETE: CPT

## 2020-09-19 PROCEDURE — 96366 THER/PROPH/DIAG IV INF ADDON: CPT | Performed by: EMERGENCY MEDICINE

## 2020-09-19 PROCEDURE — 21400000 ZZH R&B CCU UMMC

## 2020-09-19 PROCEDURE — 25000131 ZZH RX MED GY IP 250 OP 636 PS 637: Performed by: STUDENT IN AN ORGANIZED HEALTH CARE EDUCATION/TRAINING PROGRAM

## 2020-09-19 PROCEDURE — 85025 COMPLETE CBC W/AUTO DIFF WBC: CPT

## 2020-09-19 PROCEDURE — 36415 COLL VENOUS BLD VENIPUNCTURE: CPT

## 2020-09-19 PROCEDURE — 83735 ASSAY OF MAGNESIUM: CPT

## 2020-09-19 PROCEDURE — 83615 LACTATE (LD) (LDH) ENZYME: CPT

## 2020-09-19 PROCEDURE — 25000132 ZZH RX MED GY IP 250 OP 250 PS 637

## 2020-09-19 RX ORDER — ALLOPURINOL 100 MG/1
100 TABLET ORAL DAILY
Status: DISCONTINUED | OUTPATIENT
Start: 2020-09-19 | End: 2020-09-21

## 2020-09-19 RX ORDER — TRAMADOL HYDROCHLORIDE 50 MG/1
50 TABLET ORAL EVERY 6 HOURS PRN
Status: DISCONTINUED | OUTPATIENT
Start: 2020-09-19 | End: 2020-09-19

## 2020-09-19 RX ORDER — ASPIRIN 81 MG/1
81 TABLET, CHEWABLE ORAL DAILY
Status: DISCONTINUED | OUTPATIENT
Start: 2020-09-19 | End: 2020-09-27 | Stop reason: HOSPADM

## 2020-09-19 RX ORDER — TRAMADOL HYDROCHLORIDE 50 MG/1
50 TABLET ORAL 2 TIMES DAILY
Status: DISCONTINUED | OUTPATIENT
Start: 2020-09-19 | End: 2020-09-19

## 2020-09-19 RX ORDER — MONTELUKAST SODIUM 10 MG/1
10 TABLET ORAL AT BEDTIME
Status: DISCONTINUED | OUTPATIENT
Start: 2020-09-19 | End: 2020-09-27 | Stop reason: HOSPADM

## 2020-09-19 RX ORDER — PREDNISONE 20 MG/1
20 TABLET ORAL DAILY
Status: DISCONTINUED | OUTPATIENT
Start: 2020-09-19 | End: 2020-09-21

## 2020-09-19 RX ORDER — NALOXONE HYDROCHLORIDE 0.4 MG/ML
.1-.4 INJECTION, SOLUTION INTRAMUSCULAR; INTRAVENOUS; SUBCUTANEOUS
Status: DISCONTINUED | OUTPATIENT
Start: 2020-09-19 | End: 2020-09-27 | Stop reason: HOSPADM

## 2020-09-19 RX ORDER — TRAMADOL HYDROCHLORIDE 50 MG/1
50 TABLET ORAL 2 TIMES DAILY PRN
Status: DISCONTINUED | OUTPATIENT
Start: 2020-09-19 | End: 2020-09-27 | Stop reason: HOSPADM

## 2020-09-19 RX ORDER — NICOTINE POLACRILEX 4 MG
15-30 LOZENGE BUCCAL
Status: DISCONTINUED | OUTPATIENT
Start: 2020-09-19 | End: 2020-09-27 | Stop reason: HOSPADM

## 2020-09-19 RX ORDER — ALBUTEROL SULFATE 90 UG/1
2 AEROSOL, METERED RESPIRATORY (INHALATION) EVERY 4 HOURS PRN
Status: DISCONTINUED | OUTPATIENT
Start: 2020-09-19 | End: 2020-09-27 | Stop reason: HOSPADM

## 2020-09-19 RX ORDER — ACETAMINOPHEN 325 MG/1
650 TABLET ORAL EVERY 4 HOURS PRN
Status: DISCONTINUED | OUTPATIENT
Start: 2020-09-19 | End: 2020-09-27 | Stop reason: HOSPADM

## 2020-09-19 RX ORDER — ALBUTEROL SULFATE 90 UG/1
2 AEROSOL, METERED RESPIRATORY (INHALATION) ONCE
Status: COMPLETED | OUTPATIENT
Start: 2020-09-19 | End: 2020-09-19

## 2020-09-19 RX ORDER — PANTOPRAZOLE SODIUM 40 MG/1
40 TABLET, DELAYED RELEASE ORAL
Status: DISCONTINUED | OUTPATIENT
Start: 2020-09-19 | End: 2020-09-27 | Stop reason: HOSPADM

## 2020-09-19 RX ORDER — PIPERACILLIN SODIUM, TAZOBACTAM SODIUM 4; .5 G/20ML; G/20ML
4.5 INJECTION, POWDER, LYOPHILIZED, FOR SOLUTION INTRAVENOUS EVERY 6 HOURS
Status: DISCONTINUED | OUTPATIENT
Start: 2020-09-19 | End: 2020-09-20

## 2020-09-19 RX ORDER — AMIODARONE HYDROCHLORIDE 200 MG/1
400 TABLET ORAL DAILY
Status: DISCONTINUED | OUTPATIENT
Start: 2020-09-19 | End: 2020-09-27 | Stop reason: HOSPADM

## 2020-09-19 RX ORDER — HYDRALAZINE HYDROCHLORIDE 10 MG/1
30 TABLET, FILM COATED ORAL 3 TIMES DAILY
Status: DISCONTINUED | OUTPATIENT
Start: 2020-09-19 | End: 2020-09-27 | Stop reason: HOSPADM

## 2020-09-19 RX ORDER — POTASSIUM CHLORIDE 750 MG/1
20 TABLET, EXTENDED RELEASE ORAL DAILY
Status: DISCONTINUED | OUTPATIENT
Start: 2020-09-19 | End: 2020-09-21

## 2020-09-19 RX ORDER — BUMETANIDE 1 MG/1
1 TABLET ORAL 2 TIMES DAILY
Status: DISCONTINUED | OUTPATIENT
Start: 2020-09-19 | End: 2020-09-20

## 2020-09-19 RX ORDER — GABAPENTIN 300 MG/1
600 CAPSULE ORAL AT BEDTIME
Status: DISCONTINUED | OUTPATIENT
Start: 2020-09-19 | End: 2020-09-27 | Stop reason: HOSPADM

## 2020-09-19 RX ORDER — DEXTROSE MONOHYDRATE 25 G/50ML
25-50 INJECTION, SOLUTION INTRAVENOUS
Status: DISCONTINUED | OUTPATIENT
Start: 2020-09-19 | End: 2020-09-27 | Stop reason: HOSPADM

## 2020-09-19 RX ADMIN — PIPERACILLIN SODIUM AND TAZOBACTAM SODIUM 4.5 G: 4; .5 INJECTION, POWDER, LYOPHILIZED, FOR SOLUTION INTRAVENOUS at 11:29

## 2020-09-19 RX ADMIN — HYDRALAZINE HYDROCHLORIDE 30 MG: 10 TABLET, FILM COATED ORAL at 16:44

## 2020-09-19 RX ADMIN — TRAMADOL HYDROCHLORIDE 50 MG: 50 TABLET, FILM COATED ORAL at 16:44

## 2020-09-19 RX ADMIN — VANCOMYCIN HYDROCHLORIDE 2000 MG: 10 INJECTION, POWDER, LYOPHILIZED, FOR SOLUTION INTRAVENOUS at 16:43

## 2020-09-19 RX ADMIN — GABAPENTIN 600 MG: 300 CAPSULE ORAL at 21:45

## 2020-09-19 RX ADMIN — PANTOPRAZOLE SODIUM 40 MG: 40 TABLET, DELAYED RELEASE ORAL at 07:34

## 2020-09-19 RX ADMIN — ASPIRIN 81 MG CHEWABLE TABLET 81 MG: 81 TABLET CHEWABLE at 16:44

## 2020-09-19 RX ADMIN — FLUTICASONE FUROATE AND VILANTEROL TRIFENATATE 1 PUFF: 200; 25 POWDER RESPIRATORY (INHALATION) at 07:48

## 2020-09-19 RX ADMIN — ALLOPURINOL 100 MG: 100 TABLET ORAL at 07:47

## 2020-09-19 RX ADMIN — UMECLIDINIUM 1 PUFF: 62.5 AEROSOL, POWDER ORAL at 07:48

## 2020-09-19 RX ADMIN — AMIODARONE HYDROCHLORIDE 400 MG: 200 TABLET ORAL at 07:47

## 2020-09-19 RX ADMIN — INSULIN ASPART 1 UNITS: 100 INJECTION, SOLUTION INTRAVENOUS; SUBCUTANEOUS at 17:34

## 2020-09-19 RX ADMIN — INSULIN GLARGINE 3 UNITS: 100 INJECTION, SOLUTION SUBCUTANEOUS at 07:49

## 2020-09-19 RX ADMIN — INSULIN ASPART 1 UNITS: 100 INJECTION, SOLUTION INTRAVENOUS; SUBCUTANEOUS at 12:33

## 2020-09-19 RX ADMIN — ALBUTEROL SULFATE 2 PUFF: 108 INHALANT RESPIRATORY (INHALATION) at 00:48

## 2020-09-19 RX ADMIN — TRAMADOL HYDROCHLORIDE 50 MG: 50 TABLET, FILM COATED ORAL at 07:34

## 2020-09-19 RX ADMIN — HYDRALAZINE HYDROCHLORIDE 30 MG: 10 TABLET, FILM COATED ORAL at 20:25

## 2020-09-19 RX ADMIN — PIPERACILLIN SODIUM AND TAZOBACTAM SODIUM 4.5 G: 4; .5 INJECTION, POWDER, LYOPHILIZED, FOR SOLUTION INTRAVENOUS at 05:57

## 2020-09-19 RX ADMIN — CITALOPRAM HYDROBROMIDE 30 MG: 20 TABLET ORAL at 21:45

## 2020-09-19 RX ADMIN — PIPERACILLIN SODIUM AND TAZOBACTAM SODIUM 4.5 G: 4; .5 INJECTION, POWDER, LYOPHILIZED, FOR SOLUTION INTRAVENOUS at 20:24

## 2020-09-19 RX ADMIN — BUMETANIDE 1 MG: 1 TABLET ORAL at 20:24

## 2020-09-19 RX ADMIN — MONTELUKAST 10 MG: 10 TABLET, FILM COATED ORAL at 21:46

## 2020-09-19 RX ADMIN — POTASSIUM CHLORIDE 20 MEQ: 750 TABLET, EXTENDED RELEASE ORAL at 18:39

## 2020-09-19 RX ADMIN — GABAPENTIN 600 MG: 300 CAPSULE ORAL at 03:28

## 2020-09-19 RX ADMIN — PREDNISONE 20 MG: 20 TABLET ORAL at 18:39

## 2020-09-19 RX ADMIN — ACETAMINOPHEN 650 MG: 325 TABLET, FILM COATED ORAL at 17:32

## 2020-09-19 ASSESSMENT — ACTIVITIES OF DAILY LIVING (ADL)
DRESS: 0-->INDEPENDENT
SWALLOWING: 0-->SWALLOWS FOODS/LIQUIDS WITHOUT DIFFICULTY
FALL_HISTORY_WITHIN_LAST_SIX_MONTHS: NO
ADLS_ACUITY_SCORE: 10
AMBULATION: 0-->INDEPENDENT
COGNITION: 0 - NO COGNITION ISSUES REPORTED
RETIRED_EATING: 0-->INDEPENDENT
TRANSFERRING: 0-->INDEPENDENT
TOILETING: 0-->INDEPENDENT
RETIRED_COMMUNICATION: 0-->UNDERSTANDS/COMMUNICATES WITHOUT DIFFICULTY
BATHING: 0-->INDEPENDENT
ADLS_ACUITY_SCORE: 10

## 2020-09-19 NOTE — PROGRESS NOTES
"CLINICAL NUTRITION SERVICES    Reason for Assessment:  Low-sodium (2 g/day) nutrition education.     Diet History:  Jim is known to Clinical Nutrition services. On 1/22/20, he reported previous education on low Na+ diet, endorsed good understanding and did not desire further education. Pt reiterated same sentiment to RD on 8/6/20. He was recently seen by outpatient RD on 9/14/20 for heart transplant evaluation at which time he reported that he watches the sodium in his diet. Low sodium education was provided. Pt reported decreased appetite and usual intake of 1 meal/day at this time.    Spoke with Jim via in-room phone. He endorses familiarity and understanding of low sodium diet. He does report that he could use a refresher on CHO education, specifically requesting a list with common amounts of CHO in foods.     Nutrition Prescription/Recs:  Continue low-sodium diet.      Interventions:  Nutrition Education  1. Offered verbal instruction on low Na+ diet, Jim declines need for this. Provided brief verbal instruction on CHO edu.  2. Attached low Na+ education to pt's discharge instructions. Dropped off \"Guide to Carbohydrate Counting\" booklet per pt request.     Boost Glucose Control (strawberry) at 2pm d/t report of reduced appetite.       Goals:    Pt will verbalize at least five high sodium foods and the importance of avoiding added salt to foods for cooking or seasoning foods.     Follow-up:   Patient to ask any further nutrition-related questions before discharge. In addition, pt may request outpatient RD appointment.    Yao Obando, MS, RD, LD  Weekend Pager [phone]      "

## 2020-09-19 NOTE — H&P
[facility] Cardiology History and Physical    Jim Willingham MRN# [id number]   Age: 65 year old YOB: 1955     Date of Admission:  9/18/2020    Primary care provider: Jovany Salas          Assessment and Plan:   Jim Willingham is a 65 year old male with PMHx significant for NICM (EF 20%) s/p HM II LVAD placement (6/19/17)as DT due to obesity, A-fib, RV failure, CKD 3, DM2 c/b neuropathy, CECILIA, HTN, Asthma who is admitted with bacteremia.     #. Bacteremia  Presenting with 2-3 days of chills, myalgias, headaches. WBC 5.6. BCx 9/17 with GPC in clusters in 1/2 bottles. Initially sent home after BCx drawn on Levaquin and admitted now after BCx turned positive. Source unknown, although LVAD site appears clean, this is possible.  - Antibiotics:            --Levaquin 9/17 - 9/18            --Vancomycin 9/18 - present            --Zosyn 9/18 - present  - BCx 9/17: 1/2 bottles with GPC in clusters  - BCx 9/18: NGTD  - Echo ordered       #.  Chronic systolic heart failure secondary to NICM  Stage D  NYHA Class IIIA  ACEi/ARB: Losartan stopped d/t fluctuating renal function, hold hydralazine given bacteremia  BB: Stopped during recent admission  Aldosterone antagonist: Stopped d/t hyperkalemia.  SCD prophylaxis: ICD  Fluid status:  Appears euvolemic, hold bumex given bacteremia       #.  S/P LVAD implant as DT due to obesity  Goal is to loose weight to be eligible for transplant.  Anticoagulation: Warfarin with INR goal of 2-3, hold given INR>3  Antiplatelet: Aspirin 81 mg daily- okay to hold while supratheraputic inr.  MAP: Goal 65-85   LDH: Stable, pending  VAD Interrogation today:  see chart       #. PAF  - continue amiodarone  - hold warfarin give INR>3    #. CKD 3  BL in the past year has been 1.2-1.4. Likely new BL 1.4-1.6 given recent WALTER  - Hold bumex given bacteremia    #. DM2  Last A1c 7.5 on 8/2020. On Lantus 6U qam and novolog.  - Lantus 3U qam, med ssi, hypoglycemic protocol  - RN to use Dexcom  "readings and no fingersticks, QID    #. DM neuropathy  #. Chronic pain  - Continue gabapentin and tramadol    #. RV failure  - Last RHC wedge was 4, RA was 6.  - Now off metoprolol, continue amidoarone  - Working towards weight loss so that he can be evaluated/listed for transplant    Chronic Problems  #. Depression: Continue Celexa.   #. GERD: Continue Protonix.   #. Gout: Continue Allopurinol.   #. Obesity s/p Gastric Bypass with Chronic anemia: on IV Iron infusions  #. Asthma: Continue home inhalers.   #. CECILIA: Continue home cpap    FEN: carb-consistent diet  Prophylaxis: hold PTA warfarin, supratherapuetic INR  Code Status: Full code  Disposition: Admit to [facility] 2 for treatment of bacteremia     Will formally staff in AM.     Nakul Tao, PGY-2  Internal Medicine   Pager: [phone]          Chief Complaint:   Positive blood cultures  Myalgias  Chills         History of Present Illness:   Jim Willingham is a 65 year old male with PMHx significant for NICM (EF 20%) s/p HM II LVAD placement (6/19/17)as DT due to obesity, A-fib, RV failure, CKD 3, DM2 c/b neuropathy, CECILIA, HTN, Asthma who is admitted with bacteremia.     Patient in usual state of health until Thursday, 9/17 when her started to have myalgias, headaches, chills, sweats, SOB and felt like a \"semi-truck rolled over him\". He was seen in ED with compelete work-up including BCx and thought to possibly have PNA and was discharged on levaquin. He was called in today, 9/18 due to positive blood cultures. He states that he took 2 doses of levaquin thus far and feels better. He states that now its feels like \"a bus ran him over instead of a semi-trucK\".  He otherwise continues to have headaches, but denies fevers/chills, vision changes, chest pain, SOB, abd pain, N/V, weakness of his extremities.          Review of Systems:   10 point ROS negative unless noted above         Past Medical History:     Last Echocardiogram: 8/5/20   Interpretation Summary  LVAD " cannula was seen in the expected anatomic position in the LV apex.  HM2 at 9400RPM.  LVIDd 61mm.  Septum normal.  Aortic valve remain closed.  Normal flow velocities.  Global right ventricular function is mildly to moderately reduced.  The inferior vena cava is normal.  No pericardial effusion is present.      Medical History reviewed.   Past Medical History:   Diagnosis Date     Benign essential hypertension 5/11/2017     Cardiomyopathy, unspecified (H) 5/8/2017     CKD (chronic kidney disease) stage 3, GFR 30-59 ml/min (H) 5/11/2017     Depression 5/11/2017     Diabetes mellitus (H) 5/11/2017     H/O gastric bypass 5/11/2017     ICD (implantable cardioverter-defibrillator), biventricular, in situ 5/11/2017     LVAD (left ventricular assist device) present (H)      NICM (nonischemic cardiomyopathy) (H)/ EF 20% 5/11/2017    ECHO: LVEDd. 7.66 cm, Restrictive pattern , Severe mitral valve regurgitation     CECILIA (obstructive sleep apnea) 5/11/2017     Paroxysmal atrial fibrillation (H) 5/11/2017     Paroxysmal VT (H) 5/11/2017     Uncomplicated asthma      Vitamin B12 deficiency (non anemic) 5/11/2017             Past Surgical History:   Surgical History reviewed.   Past Surgical History:   Procedure Laterality Date     ANESTHESIA CARDIOVERSION N/A 5/11/2020    Procedure: ANESTHESIA, FOR CARDIOVERSION @1100;  Surgeon: GENERIC ANESTHESIA PROVIDER;  Location: [facility] OR     CV RIGHT HEART CATH N/A 7/24/2019    Procedure: CV RIGHT HEART CATH;  Surgeon: Renu eSars MD;  Location:  HEART CARDIAC CATH LAB     CV RIGHT HEART CATH N/A 8/5/2020    Procedure: CV RIGHT HEART CATH;  Surgeon: Nicola Seth MD;  Location:  HEART CARDIAC CATH LAB     GI SURGERY  2003    [facility]     INSERT VENTRICULAR ASSIST DEVICE LEFT (HEARTMATE II) N/A 6/19/2017    Procedure: INSERT VENTRICULAR ASSIST DEVICE LEFT (HEARTMATE II);  Median Sternotomy Heartmate II Left Ventricular Assist Device Insertion on Pump Oxygenator;  Surgeon: Dann  MD Ronnie;  Location: [facility] OR     ORTHOPEDIC SURGERY      right knee wired             Social History:   Social History reviewed.   Social History     Tobacco Use     Smoking status: Former Smoker     Last attempt to quit:      Years since quittin.7     Smokeless tobacco: Never Used   Substance Use Topics     Alcohol use: No             Family History:   Family History reviewed.  Family History   Problem Relation Age of Onset     Cerebrovascular Disease Mother      Diabetes Mother      Hypertension Mother      Coronary Artery Disease Father      Diabetes Type 2  Father              Allergies:     Allergies   Allergen Reactions     Beer Shortness Of Breath     Grass Shortness Of Breath     Ace Inhibitors Cough     Dust Mites Other (See Comments)     Asthma     Mold Other (See Comments)     Asthma     Penicillins Other (See Comments)     Unknown - childhood exposure     Sulfa Drugs Other (See Comments) and Unknown     Unknown childhood reaction             Medications:   Medications Reviewed.   Current Facility-Administered Medications   Medication     gabapentin (NEURONTIN) capsule 600 mg     traMADol (ULTRAM) tablet 50 mg     vancomycin 1500 mg in 0.9% NaCl 250 ml intermittent infusion 1,500 mg     Current Outpatient Medications   Medication Sig     acetaminophen (TYLENOL) 325 MG tablet Take 650 mg by mouth every 6 hours as needed for mild pain     albuterol (ALBUTEROL) 108 (90 BASE) MCG/ACT Inhaler Inhale 2 puffs into the lungs every 4 hours as needed for shortness of breath / dyspnea or wheezing     allopurinol (ZYLOPRIM) 100 MG tablet Take 1 tablet (100 mg) by mouth daily     amiodarone (PACERONE) 200 MG tablet Take 400 mg by mouth daily     aspirin 81 MG chewable tablet 1 tablet (81 mg) by Oral or Feeding Tube route daily (Patient not taking: Reported on 2020)     blood glucose monitoring (ONE TOUCH ULTRA 2) meter device kit Use to test blood sugar four times daily or as directed.     blood  glucose VI test strip Use to test blood sugar four times daily or as directed.     bumetanide (BUMEX) 0.5 MG tablet Take 2 tablets (1 mg) by mouth 2 times daily     citalopram (CELEXA) 20 MG tablet Take 30 mg by mouth At Bedtime      cyanocobalamin (VITAMIN B12) 1000 MCG/ML injection Inject 1,000 mcg into the muscle every 30 days     fluticasone-vilanterol (BREO ELLIPTA) 200-25 MCG/INH oral inhaler Inhale 1 puff into the lungs daily     gabapentin (NEURONTIN) 300 MG capsule Take 1 capsule (300 mg) by mouth twice daily and 2 capsules (600 mg) by mouth at bedtime daily.     hydrALAZINE (APRESOLINE) 10 MG tablet Take 3 tablets (30 mg) by mouth 3 times daily     insulin glargine (LANTUS SOLOSTAR) 100 UNIT/ML pen Inject 6 Units Subcutaneous At Bedtime     insulin lispro (HUMALOG KWIKPEN) 100 UNIT/ML (1 unit dial) KWIKPEN Inject 1-7 Units Subcutaneous 4 times daily     insulin pen needle (32G X 4 MM) 32G X 4 MM miscellaneous Use four pen needles daily or as directed.     levofloxacin (LEVAQUIN) 750 MG tablet Take 1 tablet (750 mg) by mouth daily for 4 days     montelukast (SINGULAIR) 10 MG tablet Take 10 mg by mouth At Bedtime     pantoprazole (PROTONIX) 40 MG EC tablet Take 1 tablet (40 mg) by mouth every morning     potassium chloride ER (KLOR-CON M) 10 MEQ CR tablet Take 2 tablets (20 mEq) by mouth daily     predniSONE (DELTASONE) 20 MG tablet Take 1 tablet (20 mg) by mouth daily     traMADol (ULTRAM) 50 MG tablet Take 2 tablets (100 mg) by mouth every 6 hours as needed for moderate pain or breakthrough pain     umeclidinium (INCRUSE ELLIPTA) 62.5 MCG/INH oral inhaler Inhale 1 puff into the lungs daily     warfarin ANTICOAGULANT (COUMADIN ANTICOAGULANT) 5 MG tablet 7.5 mg tonight, 5 mg on Saturday, 7.5 mg on Sunday. Repeat INR Monday.     zinc sulfate (ZINCATE) 220 (50 Zn) MG capsule Take 220 mg by mouth 2 times daily             Physical Exam:   Vitals were reviewed.  Blood pressure (P) 92/63, pulse (P) 79,  temperature 98.3  F (36.8  C), temperature source Oral, resp. rate (P) 16, SpO2 (P) 98 %.    General: Sitting up, pleasant, NAD  HEENT: Not assessed  CV: RRR,with LVAD hum, LVAD driveline site without any erythema, tenderness or drainage  Resp: Clear to auscultation bilaterally, no wheezes, rhonchi  Abd: Soft, non-tender, BS+, no masses appreciated  Skin: Not jaundiced, no rash, no ecchymoses  Extremities: warm and well perfused, palpable pulses, +1-2 pitting b/l LE edema below knees  Neuro: A&Ox3, No lateralizing symptoms or focal neurologic deficits         Data:   No intake/output data recorded.    ROUTINE LABS (Last four results)  CMP  Recent Labs   Lab 09/18/20 1713 09/17/20  1554 09/15/20  1040    136 137   POTASSIUM 4.2 4.2 4.2   CHLORIDE 106 104 102   CO2 26 24 26   ANIONGAP 6 8 9   * 203* 272*   BUN 22 27 27   CR 1.64* 1.49* 1.64*   GFRESTIMATED 44* 49* 44*   GFRESTBLACK 51* 57* 51*   QAMAR 8.5 8.6 8.8   PROTTOTAL 6.6* 6.9  --    ALBUMIN 3.4 3.6  --    BILITOTAL 0.6 0.7  --    ALKPHOS 97 119  --    AST 30 37  --    ALT 49 59  --      CBC  Recent Labs   Lab 09/18/20 1713 09/17/20  1554 09/15/20  1040   WBC 5.6 6.3  --    RBC 3.50* 3.67*  --    HGB 9.3* 9.7* 9.7*   HCT 32.0* 33.6*  --    MCV 91 92  --    MCH 26.6 26.4*  --    MCHC 29.1* 28.9*  --    RDW 16.4* 15.9*  --     264  --      INR  Recent Labs   Lab 09/17/20  1554 09/15/20  1040   INR 3.68* 3.74*     Arterial Blood GasNo lab results found in last 7 days.    Imaging:  CXR on 9/17/2020:                                                                   Impression: Unchanged pulmonary edema. Underlying infection not  completely excluded. LVAD. Implantable cardiac defibrillator.  Borderline cardiomegaly. Aortic atherosclerosis.     I have personally reviewed the examination and initial interpretation  and I agree with the findings.

## 2020-09-19 NOTE — ED NOTES
[facility], [place]   ED Nurse to Floor Handoff     Jim Willingham is a 65 year old male who speaks English and lives with a spouse,  in a home  They arrived in the ED by car from home    ED Chief Complaint: Blood Infection    ED Dx;   Final diagnoses:   Positive blood culture         Needed?: No    Allergies:   Allergies   Allergen Reactions     Beer Shortness Of Breath     Grass Shortness Of Breath     Ace Inhibitors Cough     Dust Mites Other (See Comments)     Asthma     Mold Other (See Comments)     Asthma     Penicillins Other (See Comments)     Unknown - childhood exposure     Sulfa Drugs Other (See Comments) and Unknown     Unknown childhood reaction   .  Past Medical Hx:   Past Medical History:   Diagnosis Date     Benign essential hypertension 5/11/2017     Cardiomyopathy, unspecified (H) 5/8/2017     CKD (chronic kidney disease) stage 3, GFR 30-59 ml/min (H) 5/11/2017     Depression 5/11/2017     Diabetes mellitus (H) 5/11/2017     H/O gastric bypass 5/11/2017     ICD (implantable cardioverter-defibrillator), biventricular, in situ 5/11/2017     LVAD (left ventricular assist device) present (H)      NICM (nonischemic cardiomyopathy) (H)/ EF 20% 5/11/2017    ECHO: LVEDd. 7.66 cm, Restrictive pattern , Severe mitral valve regurgitation     CECILIA (obstructive sleep apnea) 5/11/2017     Paroxysmal atrial fibrillation (H) 5/11/2017     Paroxysmal VT (H) 5/11/2017     Uncomplicated asthma      Vitamin B12 deficiency (non anemic) 5/11/2017      Baseline Mental status: WDL  Current Mental Status changes: at basesline    Infection present or suspected this encounter: cultures pending  Sepsis suspected: No  Isolation type: No active isolations     Activity level - Baseline/Home:  Independent  Activity Level - Current:   Independent    Bariatric equipment needed?: No    In the ED these meds were given:   Medications   vancomycin (VANCOCIN) 2,500 mg in sodium chloride 0.9 % 500  mL intermittent infusion (has no administration in time range)   piperacillin-tazobactam (ZOSYN) 4.5 g vial to attach to  mL bag (4.5 g Intravenous New Bag 9/18/20 2057)       Drips running?  No    Home pump  No    Current LDAs  Peripheral IV 09/18/20 Right Wrist (Active)   Number of days: 0       Wound 01/20/20 Right Knee Abrasion(s) scabbed and open center appears dry (Active)   Number of days: 242       Incision/Surgical Site 06/19/17 Chest (Active)   Number of days: 1187       Incision/Surgical Site 07/01/17 Sternum (Active)   Number of days: 1175       Incision/Surgical Site 07/01/17 (Active)   Number of days: 1175       Incision/Surgical Site 07/01/17 Right Abdomen (Active)   Number of days: 1175       Labs results:   Labs Ordered and Resulted from Time of ED Arrival Up to the Time of Departure from the ED   CBC WITH PLATELETS DIFFERENTIAL - Abnormal; Notable for the following components:       Result Value    RBC Count 3.50 (*)     Hemoglobin 9.3 (*)     Hematocrit 32.0 (*)     MCHC 29.1 (*)     RDW 16.4 (*)     Absolute Lymphocytes 0.5 (*)     All other components within normal limits   COMPREHENSIVE METABOLIC PANEL - Abnormal; Notable for the following components:    Glucose 169 (*)     Creatinine 1.64 (*)     GFR Estimate 44 (*)     GFR Estimate If Black 51 (*)     Protein Total 6.6 (*)     All other components within normal limits   BLOOD CULTURE   BLOOD CULTURE       Imaging Studies: No results found for this or any previous visit (from the past 24 hour(s)).    Recent vital signs:   Pulse 120   Temp 98.3  F (36.8  C) (Oral)   Resp 16   SpO2 94%     [place] Coma Scale Score: 15 (09/18/20 2104)       Cardiac Rhythm: Other  Pt needs tele? Yes  Skin/wound Issues: None    Code Status: Full Code    Pain control: pt had none    Nausea control: pt had none    Abnormal labs/tests/findings requiring intervention: see results    Family present during ED course? No   Family Comments/Social Situation  comments: patient pleasant and cooperative. heartmate 2.     Tasks needing completion: None    Kenisha Wadsworth, RN  [phone] [facility]

## 2020-09-19 NOTE — PHARMACY-VANCOMYCIN DOSING SERVICE
Pharmacy Vancomycin Initial Note  Date of Service 2020  Patient's  1955  65 year old, male    Indication: Sepsis, Started previously for bacteremia    Current estimated CrCl = Estimated Creatinine Clearance: 53.4 mL/min (A) (based on SCr of 1.64 mg/dL (H)).    Creatinine for last 3 days  2020:  3:54 PM Creatinine 1.49 mg/dL  2020:  5:13 PM Creatinine 1.64 mg/dL    Recent Vancomycin Level(s) for last 3 days  No results found for requested labs within last 72 hours.      Vancomycin IV Administrations (past 72 hours)                   vancomycin (VANCOCIN) 2,500 mg in sodium chloride 0.9 % 500 mL intermittent infusion (mg) 2,500 mg New Bag 20 2135                Nephrotoxins and other renal medications (From now, onward)    Start     Dose/Rate Route Frequency Ordered Stop    20 1530  vancomycin (VANCOCIN) 2,000 mg in sodium chloride 0.9 % 500 mL intermittent infusion      2,000 mg  over 2 Hours Intravenous EVERY 18 HOURS 20 0453      20 0450  piperacillin-tazobactam (ZOSYN) 4.5 g vial to attach to  mL bag      4.5 g  over 30 Minutes Intravenous EVERY 6 HOURS 20 0446            Contrast Orders - past 72 hours (72h ago, onward)    None                Plan:  1.  Vancomycin 2500mg X1 given in ED. Continue  2000 mg IV q18h.   2.  Goal Trough Level: 15-20 mg/L   3.  Pharmacy will check trough levels as appropriate in 1-3 Days.    4. Serum creatinine levels will be ordered daily for the first week of therapy and at least twice weekly for subsequent weeks.    5. [place] method utilized to dose vancomycin therapy: Method 2    Lawrence Vázquez, [facility]

## 2020-09-19 NOTE — PLAN OF CARE
Focus:  Admission  D: Diagnosis: Blood culture was positive Admitted from: [facility] via: bed and accompanied by: no one  I: Belongings: Placed in closet; valuables sent home with family. Admission paperwork: not complete. Teaching: Call don't fall, use of console, meal times, visiting hours, orientation to unit, when to call for the RN (angina/sob/dizzyness, etc.), and stressed the importance of safety. Telemetry:Placed on pt Ht./Wt.: complete  A: A&Ox4. Up with SBA. No complaints.  P: Administer medications per MD order, follow plan of care, and notify MD as necessary with questions/concerns.    He arrived on 6C at 14:45

## 2020-09-19 NOTE — PROGRESS NOTES
Pt's belongings:   - Cpap  -glasses  - Lvad back up equipments   - Clothing  -Shoes  -wedding ring   -Cell phone with   -small green bag  -Wallet with cards /pt wants to keep in room with him. No cash.

## 2020-09-19 NOTE — PHARMACY-VANCOMYCIN DOSING SERVICE
Pharmacy Vancomycin Initial Note  Date of Service 2020  Patient's  1955  65 year old, male    Indication: Bacteremia    Current estimated CrCl = Estimated Creatinine Clearance: 53.4 mL/min (A) (based on SCr of 1.64 mg/dL (H)).    Creatinine for last 3 days  2020:  3:54 PM Creatinine 1.49 mg/dL  2020:  5:13 PM Creatinine 1.64 mg/dL    Recent Vancomycin Level(s) for last 3 days  No results found for requested labs within last 72 hours.      Vancomycin IV Administrations (past 72 hours)      No vancomycin orders with administrations in past 72 hours.                Nephrotoxins and other renal medications (From now, onward)    Start     Dose/Rate Route Frequency Ordered Stop    20 1600  vancomycin 1500 mg in 0.9% NaCl 250 ml intermittent infusion 1,500 mg      1,500 mg  over 90 Minutes Intravenous EVERY 18 HOURS 20  vancomycin (VANCOCIN) 2,500 mg in sodium chloride 0.9 % 500 mL intermittent infusion      2,500 mg  over 2 Hours Intravenous ONCE 20            Contrast Orders - past 72 hours (72h ago, onward)    None                Plan:  1.  Start vancomycin  2500 mg IV x1, followed by 1500 mg IV q18h.   2.  Goal Trough Level: 15-20 mg/L   3.  Pharmacy will check trough levels as appropriate in 1-3 Days.    4. Serum creatinine levels will be ordered daily for the first week of therapy and at least twice weekly for subsequent weeks.    5. [place] method utilized to dose vancomycin therapy: Method 2

## 2020-09-20 ENCOUNTER — APPOINTMENT (OUTPATIENT)
Dept: OCCUPATIONAL THERAPY | Facility: CLINIC | Age: 65
DRG: 871 | End: 2020-09-20
Payer: COMMERCIAL

## 2020-09-20 LAB
ANION GAP SERPL CALCULATED.3IONS-SCNC: 5 MMOL/L (ref 3–14)
ANION GAP SERPL CALCULATED.3IONS-SCNC: 5 MMOL/L (ref 3–14)
BACTERIA SPEC CULT: ABNORMAL
BUN SERPL-MCNC: 17 MG/DL (ref 7–30)
BUN SERPL-MCNC: 17 MG/DL (ref 7–30)
CALCIUM SERPL-MCNC: 8 MG/DL (ref 8.5–10.1)
CALCIUM SERPL-MCNC: 8.2 MG/DL (ref 8.5–10.1)
CHLORIDE SERPL-SCNC: 106 MMOL/L (ref 94–109)
CHLORIDE SERPL-SCNC: 108 MMOL/L (ref 94–109)
CO2 SERPL-SCNC: 24 MMOL/L (ref 20–32)
CO2 SERPL-SCNC: 26 MMOL/L (ref 20–32)
CREAT SERPL-MCNC: 1.42 MG/DL (ref 0.66–1.25)
CREAT SERPL-MCNC: 1.44 MG/DL (ref 0.66–1.25)
ERYTHROCYTE [DISTWIDTH] IN BLOOD BY AUTOMATED COUNT: 17 % (ref 10–15)
GFR SERPL CREATININE-BSD FRML MDRD: 51 ML/MIN/{1.73_M2}
GFR SERPL CREATININE-BSD FRML MDRD: 52 ML/MIN/{1.73_M2}
GLUCOSE SERPL-MCNC: 188 MG/DL (ref 70–99)
GLUCOSE SERPL-MCNC: 203 MG/DL (ref 70–99)
HCT VFR BLD AUTO: 30 % (ref 40–53)
HGB BLD-MCNC: 8.7 G/DL (ref 13.3–17.7)
INR PPP: 3.95 (ref 0.86–1.14)
LDH SERPL L TO P-CCNC: 306 U/L (ref 85–227)
MAGNESIUM SERPL-MCNC: 2.1 MG/DL (ref 1.6–2.3)
MAGNESIUM SERPL-MCNC: 2.1 MG/DL (ref 1.6–2.3)
MCH RBC QN AUTO: 26.8 PG (ref 26.5–33)
MCHC RBC AUTO-ENTMCNC: 29 G/DL (ref 31.5–36.5)
MCV RBC AUTO: 92 FL (ref 78–100)
PLATELET # BLD AUTO: 213 10E9/L (ref 150–450)
POTASSIUM SERPL-SCNC: 4.1 MMOL/L (ref 3.4–5.3)
POTASSIUM SERPL-SCNC: 4.3 MMOL/L (ref 3.4–5.3)
RBC # BLD AUTO: 3.25 10E12/L (ref 4.4–5.9)
SODIUM SERPL-SCNC: 137 MMOL/L (ref 133–144)
SODIUM SERPL-SCNC: 137 MMOL/L (ref 133–144)
SPECIMEN SOURCE: ABNORMAL
WBC # BLD AUTO: 4.8 10E9/L (ref 4–11)

## 2020-09-20 PROCEDURE — 25000132 ZZH RX MED GY IP 250 OP 250 PS 637: Performed by: INTERNAL MEDICINE

## 2020-09-20 PROCEDURE — 93750 INTERROGATION VAD IN PERSON: CPT | Performed by: INTERNAL MEDICINE

## 2020-09-20 PROCEDURE — 25000132 ZZH RX MED GY IP 250 OP 250 PS 637: Performed by: STUDENT IN AN ORGANIZED HEALTH CARE EDUCATION/TRAINING PROGRAM

## 2020-09-20 PROCEDURE — 25000131 ZZH RX MED GY IP 250 OP 636 PS 637: Performed by: STUDENT IN AN ORGANIZED HEALTH CARE EDUCATION/TRAINING PROGRAM

## 2020-09-20 PROCEDURE — 25000128 H RX IP 250 OP 636

## 2020-09-20 PROCEDURE — 97165 OT EVAL LOW COMPLEX 30 MIN: CPT | Mod: GO

## 2020-09-20 PROCEDURE — 83615 LACTATE (LD) (LDH) ENZYME: CPT | Performed by: STUDENT IN AN ORGANIZED HEALTH CARE EDUCATION/TRAINING PROGRAM

## 2020-09-20 PROCEDURE — 87040 BLOOD CULTURE FOR BACTERIA: CPT | Performed by: STUDENT IN AN ORGANIZED HEALTH CARE EDUCATION/TRAINING PROGRAM

## 2020-09-20 PROCEDURE — 85610 PROTHROMBIN TIME: CPT

## 2020-09-20 PROCEDURE — 36415 COLL VENOUS BLD VENIPUNCTURE: CPT | Performed by: STUDENT IN AN ORGANIZED HEALTH CARE EDUCATION/TRAINING PROGRAM

## 2020-09-20 PROCEDURE — 83735 ASSAY OF MAGNESIUM: CPT | Performed by: STUDENT IN AN ORGANIZED HEALTH CARE EDUCATION/TRAINING PROGRAM

## 2020-09-20 PROCEDURE — 85027 COMPLETE CBC AUTOMATED: CPT | Performed by: STUDENT IN AN ORGANIZED HEALTH CARE EDUCATION/TRAINING PROGRAM

## 2020-09-20 PROCEDURE — 25800030 ZZH RX IP 258 OP 636

## 2020-09-20 PROCEDURE — 21400000 ZZH R&B CCU UMMC

## 2020-09-20 PROCEDURE — 97530 THERAPEUTIC ACTIVITIES: CPT | Mod: GO

## 2020-09-20 PROCEDURE — 99233 SBSQ HOSP IP/OBS HIGH 50: CPT | Mod: 25 | Performed by: INTERNAL MEDICINE

## 2020-09-20 PROCEDURE — 80048 BASIC METABOLIC PNL TOTAL CA: CPT | Performed by: STUDENT IN AN ORGANIZED HEALTH CARE EDUCATION/TRAINING PROGRAM

## 2020-09-20 PROCEDURE — 36415 COLL VENOUS BLD VENIPUNCTURE: CPT

## 2020-09-20 PROCEDURE — 25000132 ZZH RX MED GY IP 250 OP 250 PS 637

## 2020-09-20 RX ORDER — BUMETANIDE 2 MG/1
2 TABLET ORAL
Status: DISCONTINUED | OUTPATIENT
Start: 2020-09-20 | End: 2020-09-27 | Stop reason: HOSPADM

## 2020-09-20 RX ADMIN — INSULIN ASPART 1 UNITS: 100 INJECTION, SOLUTION INTRAVENOUS; SUBCUTANEOUS at 07:51

## 2020-09-20 RX ADMIN — HYDRALAZINE HYDROCHLORIDE 30 MG: 10 TABLET, FILM COATED ORAL at 20:06

## 2020-09-20 RX ADMIN — BUMETANIDE 2 MG: 2 TABLET ORAL at 13:53

## 2020-09-20 RX ADMIN — CITALOPRAM HYDROBROMIDE 30 MG: 20 TABLET ORAL at 21:42

## 2020-09-20 RX ADMIN — POTASSIUM CHLORIDE 20 MEQ: 750 TABLET, EXTENDED RELEASE ORAL at 07:59

## 2020-09-20 RX ADMIN — PANTOPRAZOLE SODIUM 40 MG: 40 TABLET, DELAYED RELEASE ORAL at 08:00

## 2020-09-20 RX ADMIN — HYDRALAZINE HYDROCHLORIDE 30 MG: 10 TABLET, FILM COATED ORAL at 07:59

## 2020-09-20 RX ADMIN — BUMETANIDE 1 MG: 1 TABLET ORAL at 07:58

## 2020-09-20 RX ADMIN — GABAPENTIN 600 MG: 300 CAPSULE ORAL at 21:42

## 2020-09-20 RX ADMIN — ACETAMINOPHEN 650 MG: 325 TABLET, FILM COATED ORAL at 20:34

## 2020-09-20 RX ADMIN — PIPERACILLIN SODIUM AND TAZOBACTAM SODIUM 4.5 G: 4; .5 INJECTION, POWDER, LYOPHILIZED, FOR SOLUTION INTRAVENOUS at 07:49

## 2020-09-20 RX ADMIN — MONTELUKAST 10 MG: 10 TABLET, FILM COATED ORAL at 21:42

## 2020-09-20 RX ADMIN — TRAMADOL HYDROCHLORIDE 50 MG: 50 TABLET, FILM COATED ORAL at 20:34

## 2020-09-20 RX ADMIN — HYDRALAZINE HYDROCHLORIDE 30 MG: 10 TABLET, FILM COATED ORAL at 13:53

## 2020-09-20 RX ADMIN — UMECLIDINIUM 1 PUFF: 62.5 AEROSOL, POWDER ORAL at 07:54

## 2020-09-20 RX ADMIN — FLUTICASONE FUROATE AND VILANTEROL TRIFENATATE 1 PUFF: 200; 25 POWDER RESPIRATORY (INHALATION) at 07:54

## 2020-09-20 RX ADMIN — ALLOPURINOL 100 MG: 100 TABLET ORAL at 08:02

## 2020-09-20 RX ADMIN — INSULIN GLARGINE 3 UNITS: 100 INJECTION, SOLUTION SUBCUTANEOUS at 07:52

## 2020-09-20 RX ADMIN — INSULIN ASPART 1 UNITS: 100 INJECTION, SOLUTION INTRAVENOUS; SUBCUTANEOUS at 12:32

## 2020-09-20 RX ADMIN — VANCOMYCIN HYDROCHLORIDE 2000 MG: 10 INJECTION, POWDER, LYOPHILIZED, FOR SOLUTION INTRAVENOUS at 09:44

## 2020-09-20 RX ADMIN — PREDNISONE 20 MG: 20 TABLET ORAL at 08:00

## 2020-09-20 RX ADMIN — AMIODARONE HYDROCHLORIDE 400 MG: 200 TABLET ORAL at 08:01

## 2020-09-20 RX ADMIN — ACETAMINOPHEN 650 MG: 325 TABLET, FILM COATED ORAL at 09:51

## 2020-09-20 RX ADMIN — INSULIN ASPART 3 UNITS: 100 INJECTION, SOLUTION INTRAVENOUS; SUBCUTANEOUS at 17:19

## 2020-09-20 RX ADMIN — PIPERACILLIN SODIUM AND TAZOBACTAM SODIUM 4.5 G: 4; .5 INJECTION, POWDER, LYOPHILIZED, FOR SOLUTION INTRAVENOUS at 01:01

## 2020-09-20 RX ADMIN — PIPERACILLIN SODIUM AND TAZOBACTAM SODIUM 4.5 G: 4; .5 INJECTION, POWDER, LYOPHILIZED, FOR SOLUTION INTRAVENOUS at 13:51

## 2020-09-20 ASSESSMENT — PAIN DESCRIPTION - DESCRIPTORS
DESCRIPTORS: ACHING;SORE
DESCRIPTORS: ACHING;SORE
DESCRIPTORS: ACHING

## 2020-09-20 ASSESSMENT — ACTIVITIES OF DAILY LIVING (ADL)
ADLS_ACUITY_SCORE: 10

## 2020-09-20 NOTE — PROGRESS NOTES
.        [facility], [place]    Cardiology Progress Note- Cards 2        Date of Admission:  9/18/2020     Assessment & Plan: SL   Jim Willingham is a 65 year old male with PMHx significant for NICM (EF 20%) s/p HM II LVAD placement (6/19/17)as DT due to obesity, A-fib, RV failure, CKD 3, DM2 c/b neuropathy, CECILIA, HTN, Asthma who is admitted with bacteremia.      Changes today  -BC 1/5 staph hominis  -Stopped zosyn. ID consulted - pending formal reccs  -Increased bumex 1 mg to 2 mg BID      #. Staph hominis bacteremia  BCx 9/17 with GPC in clusters in 1/2 bottles. Presenting with 2-3 days of chills, myalgias, headaches. WBC 5.6. Initially sent home after BCx drawn on Levaquin. Source unknown, LVAD site appears clean, dressing changed last week.  - Antibiotics:             -Consult ID             -Levaquin 9/17 - 9/18             -Zosyn 9/18 - 9/20             -Vancomycin 9/18- present         - BCx 9/18: NGTD            9/19: NGTD     #.  Chronic systolic heart failure secondary to NICM  Stage D  NYHA Class IIIA  ACEi/ARB: Losartan stopped d/t fluctuating renal function  Afterload reduction:  hydral 30 mg TID  BB: Stopped during recent admission  Aldosterone antagonist: Stopped d/t hyperkalemia.  SCD prophylaxis: ICD  Fluid status: Increased bumex 1 mg to 2 mg BID        #.  S/P LVAD implant as DT due to obesity  Goal is to loose weight to be eligible for transplant.  Anticoagulation: Warfarin with INR goal of 2-3, hold given INR>3  Antiplatelet: Aspirin 81 mg daily- okay to hold while supratheraputic inr.  MAP: Goal 65-85   LDH: Stable, pending  VAD Interrogation today:  see chart       #. PAF  - continue amiodarone  - hold warfarin give INR>3     #. CKD 3  BL in the past year has been 1.2-1.4. Likely new BL 1.4-1.6 given recent WALTER     #. DM2  Last A1c 7.5 on 8/2020. On Lantus 6U qam and novolog.  - Lantus 3U qam, med ssi, hypoglycemic protocol  - RN to use Dexcom readings and no  fingersticks, QID     #. DM neuropathy  #. Chronic pain  - Continue gabapentin and tramadol  - Prednisone 20 mg daily. Reportedly has been on it for many yrs, will consider tapering during admission       Chronic Problems  #. Depression: Continue Celexa.   #. GERD: Continue Protonix.   #. Gout: Continue Allopurinol.   #. Obesity s/p Gastric Bypass with Chronic anemia: on IV Iron infusions  #. Asthma: Continue home inhalers.   #. CECILIA: Continue home cpap     FEN: carb-consistent diet  Prophylaxis: warfarin, supratherapuetic INR, per pharmacy  Code Status: Full code  Disposition: Bacteremia work up      Disposition Plan   Expected discharge: 2 - 3 days, recommended to prior living arrangement and treatment for the bactermia      Entered: Kamran Hong MD 09/20/2020, 10:33 AM       The patient's care was discussed with the attending physician Dr. Nicola Hong MD  [facility], [place]  Pager: [phone]  Please see sticky note for cross cover information  ______________________________________________________________________    Interval History   No acute overnight events.    Remained afebrile since admission. Denies chills or myalgias. Reports felt better with antibiotics for the past 2 days.    Discussed about the long term side effects of using prednisone 20 mg daily. Patient reported that he has been put on prednisone for 5+ for carpal tunnel syndrome and neuropathy. It was prescribed by his PCP, Dr. Salas.    Data reviewed today: I reviewed all medications, new labs and imaging results over the last 24 hours.     Physical Exam   Vital Signs: Temp: 97.7  F (36.5  C) Temp src: Oral BP: (!) 103/96 Pulse: 79   Resp: 18 SpO2: 96 % O2 Device: None (Room air)    Weight: 236 lbs 4.8 oz    General: Sitting in bed, pleasant, NAD  Neck: JVD ~13 cm  CV: RRR,with LVAD hum, LVAD driveline site without any erythema, tenderness or drainage  Resp: Clear to auscultation bilaterally, no  wheezes, rhonchi  Abd: Soft, non-tender, BS+, no masses appreciated  Extremities: warm and well perfused, palpable pulses, +1-2 pitting b/l LE edema below knees  Neuro: A&Ox3, No lateralizing symptoms or focal neurologic deficits    Data   Recent Labs   Lab 09/20/20  0646 09/20/20  0448 09/19/20  0530 09/18/20  1713 09/17/20  1554   WBC  --  4.8 7.1 5.6 6.3   HGB  --  8.7* 9.5* 9.3* 9.7*   MCV  --  92 93 91 92   PLT  --  213 249 256 264   INR 3.95*  --  4.09*  --  3.68*   NA  --  137 138 137 136   POTASSIUM  --  4.1 3.4 4.2 4.2   CHLORIDE  --  106 106 106 104   CO2  --  26 25 26 24   BUN  --  17 21 22 27   CR  --  1.42* 1.56* 1.64* 1.49*   ANIONGAP  --  5 8 6 8   QAMAR  --  8.0* 8.6 8.5 8.6   GLC  --  188* 94 169* 203*   ALBUMIN  --   --  3.4 3.4 3.6   PROTTOTAL  --   --  6.7* 6.6* 6.9   BILITOTAL  --   --  0.7 0.6 0.7   ALKPHOS  --   --  92 97 119   ALT  --   --  45 49 59   AST  --   --  29 30 37

## 2020-09-20 NOTE — PROGRESS NOTES
"   09/20/20 0900   Quick Adds   Type of Visit Initial Occupational Therapy Evaluation   Living Environment   Lives With grandchild(lidia);spouse   Living Arrangements house   Home Accessibility stairs to enter home;stairs within home   Number of Stairs, Main Entrance 1   Stair Railings, Main Entrance none   Number of Stairs, Within Home, Primary 8   Stair Railings, Within Home, Primary railing on right side (ascending)   Transportation Anticipated car, drives self;family or friend will provide   Living Environment Comment Pt lives in split level home w/wife, sister, and 6 yr old grandchild.    Home Main Entrance   Surface of Stairs, Main Entrance concrete   Landing, Stairs, Main Entrance no railings   Stairs Within Home, Primary   Stairs, Within Home, Primary 8   Surface of Stairs, Within Home, Primary carpeting   Landing, Stairs, Within Home, Primary railings present   Self-Care   Usual Activity Tolerance good   Current Activity Tolerance fair   Regular Exercise No   Equipment Currently Used at Home none   Activity/Exercise/Self-Care Comment Pt reported IND at baseline, keeps busy w/6 yr old granddaughter   Functional Level   Ambulation 0-->independent   Transferring 0-->independent   Toileting 0-->independent   Bathing 0-->independent   Dressing 0-->independent   Eating 0-->independent   Communication 0-->understands/communicates without difficulty   Swallowing 0-->swallows foods/liquids without difficulty   Cognition 0 - no cognition issues reported   Fall history within last six months no   Number of times patient has fallen within last six months 0   Which of the above functional risks had a recent onset or change? ambulation;bathing   Prior Functional Level Comment Pt IND with ADL/mobility.        Present no   General Information   Onset of Illness/Injury or Date of Surgery - Date 09/18/20   Referring Physician  Nicola Seth MD    Patient/Family Goals Statement \"Get this infection taken " "care of\"   Additional Occupational Profile Info/Pertinent History of Current Problem Per H&P: Jim Willingham is a 65 year old male with PMHx significant for NICM (EF 20%) s/p HM II LVAD placement (6/19/17)as DT due to obesity, A-fib, RV failure, CKD 3, DM2 c/b neuropathy, CECILIA, HTN, Asthma who is admitted with bacteremia.    Precautions/Limitations no known precautions/limitations   Weight-Bearing Status - LUE full weight-bearing   Weight-Bearing Status - RUE full weight-bearing   Weight-Bearing Status - LLE full weight-bearing   Weight-Bearing Status - RLE full weight-bearing   General Observations Pt pleasant and agreeable to OT session    General Info Comments Activity: up ad francisco    Cognitive Status Examination   Orientation orientation to person, place and time   Cognitive Comment Reported mild memory deficits but \"nothing too alarming\"   Visual Perception   Visual Perception Wears glasses   Visual Perception Comments Intact   Sensory Examination   Sensory Comments numbness in finger tips 2/2 javier carpel tunnel   Pain Assessment   Patient Currently in Pain No   Integumentary/Edema   Integumentary/Edema no deficits were identifed   Posture   Posture not impaired   Range of Motion (ROM)   ROM Comment  deg, RUE 95deg- limited by pain   Strength   Strength Comments BUE grossly 3/5    Hand Strength   Hand Strength Comments  strength 4/5   Mobility   Bed Mobility Comments IND   Eating/Self Feeding   Level of [place]: Eating independent   Instrumental Activities of Daily Living (IADL)   Previous Responsibilities meal prep;housekeeping;;driving;finances;medication management  (light housework, does NOT vacuum )   Activities of Daily Living Analysis   Impairments Contributing to Impaired Activities of Daily Living   (general deconditioning )   General Therapy Interventions   Planned Therapy Interventions IADL retraining;progressive activity/exercise;home program guidelines   Clinical Impression " "  Criteria for Skilled Therapeutic Interventions Met yes, treatment indicated   OT Diagnosis Decreased functional endurance   Influenced by the following impairments deconditioning, SOB   Assessment of Occupational Performance 3-5 Performance Deficits   Identified Performance Deficits bathing, functional endurance, home management   Clinical Decision Making (Complexity) Low complexity   Therapy Frequency 3x/week   Predicted Duration of Therapy Intervention (days/wks) 1 week   Anticipated Equipment Needs at Discharge shower chair;reacher   Anticipated Discharge Disposition Home with Assist   Risks and Benefits of Treatment have been explained. Yes   Patient, Family & other staff in agreement with plan of care Yes   Clinical Impression Comments Pt to benefit from skilled OT to progress overall CV endurance for daily activities    [facility] TM \"6 Clicks\"   2016, Trustees of [facility], under license to [employer].  All rights reserved.   6 Clicks Short Forms Daily Activity Inpatient Short Form   [facility]  \"6 Clicks\" Daily Activity Inpatient Short Form   1. Putting on and taking off regular lower body clothing? 4 - None   2. Bathing (including washing, rinsing, drying)? 4 - None   3. Toileting, which includes using toilet, bedpan or urinal? 4 - None   4. Putting on and taking off regular upper body clothing? 4 - None   5. Taking care of personal grooming such as brushing teeth? 4 - None   6. Eating meals? 4 - None   Daily Activity Raw Score (Score out of 24.Lower scores equate to lower levels of function) 24   Total Evaluation Time   Total Evaluation Time (Minutes) 5     "

## 2020-09-20 NOTE — PLAN OF CARE
D:pt up as tolerated steady gait uses call light, pt states some tingling in feet at night  I:reenforce use of call light if needed  P:continue to monitor

## 2020-09-20 NOTE — PROGRESS NOTES
.        [facility], [place]    Cardiology Progress Note- Cards 2        Date of Admission:  9/18/2020     Assessment & Plan: SL   Jim Willingham is a 65 year old male with PMHx significant for NICM (EF 20%) s/p HM II LVAD placement (6/19/17)as DT due to obesity, A-fib, RV failure, CKD 3, DM2 c/b neuropathy, CECILIA, HTN, Asthma who is admitted with bacteremia.      #. Gram positive cocci in clusters bacteremia  BCx 9/17 with GPC in clusters in 1/2 bottles. Presenting with 2-3 days of chills, myalgias, headaches. WBC 5.6. Initially sent home after BCx drawn on Levaquin. Source unknown, LVAD site appears clean, dressing changed last week.  - Antibiotics:            --Levaquin 9/17 - 9/18            --continue Vancomycin            -Continue Zosyn 9/18 - present  - BCx 9/18: NGTD            9/19: NGTD     #.  Chronic systolic heart failure secondary to NICM  Stage D  NYHA Class IIIA  ACEi/ARB: Losartan stopped d/t fluctuating renal function  Afterload reduction:  hydral 30 mg TID  BB: Stopped during recent admission  Aldosterone antagonist: Stopped d/t hyperkalemia.  SCD prophylaxis: ICD  Fluid status:  continue PTA bumex 1 mg BID        #.  S/P LVAD implant as DT due to obesity  Goal is to loose weight to be eligible for transplant.  Anticoagulation: Warfarin with INR goal of 2-3, hold given INR>3  Antiplatelet: Aspirin 81 mg daily- okay to hold while supratheraputic inr.  MAP: Goal 65-85   LDH: Stable, pending  VAD Interrogation today:  see chart       #. PAF  - continue amiodarone  - hold warfarin give INR>3     #. CKD 3  BL in the past year has been 1.2-1.4. Likely new BL 1.4-1.6 given recent WALTER     #. DM2  Last A1c 7.5 on 8/2020. On Lantus 6U qam and novolog.  - Lantus 3U qam, med ssi, hypoglycemic protocol  - RN to use Dexcom readings and no fingersticks, QID     #. DM neuropathy  #. Chronic pain  - Continue gabapentin and tramadol  - Prednisone 20 mg daily. Reportedly has been on it  for many yrs, will consider tapering during admission       Chronic Problems  #. Depression: Continue Celexa.   #. GERD: Continue Protonix.   #. Gout: Continue Allopurinol.   #. Obesity s/p Gastric Bypass with Chronic anemia: on IV Iron infusions  #. Asthma: Continue home inhalers.   #. CECILIA: Continue home cpap     FEN: carb-consistent diet  Prophylaxis: warfarin, supratherapuetic INR, per pharmacy  Code Status: Full code  Disposition: Bacteremia work up      Disposition Plan   Expected discharge: 2 - 3 days, recommended to prior living arrangement and treatment for the bactermia      Entered: Kamran Hong MD 09/19/2020, 7:16 PM       The patient's care was discussed with the attending physician Dr. Nicola Hong MD  [facility], [place]  Pager: [phone]  Please see sticky note for cross cover information  ______________________________________________________________________    Interval History   No acute overnight events.    Remained afebrile since admission. Denies chills or myalgias. Reports felt better with antibiotics for the past 2 days.    Data reviewed today: I reviewed all medications, new labs and imaging results over the last 24 hours.     Physical Exam   Vital Signs: Temp: 97.5  F (36.4  C) Temp src: Oral BP: 118/64 Pulse: 81   Resp: 18 SpO2: 97 % O2 Device: None (Room air)    Weight: 236 lbs 11.2 oz    General: laying in bed, pleasant, NAD  Neck: No appreciable JVD  CV: RRR,with LVAD hum, LVAD driveline site without any erythema, tenderness or drainage  Resp: Clear to auscultation bilaterally, no wheezes, rhonchi  Abd: Soft, non-tender, BS+, no masses appreciated  Skin: Not jaundiced, no rash, no ecchymoses  Extremities: warm and well perfused, palpable pulses, +1-2 pitting b/l LE edema below knees  Neuro: A&Ox3, No lateralizing symptoms or focal neurologic deficits    Data   Recent Labs   Lab 09/19/20  0530 09/18/20  1713 09/17/20  1554  09/15/20  1040   WBC  7.1 5.6 6.3  --   --    HGB 9.5* 9.3* 9.7*  --  9.7*   MCV 93 91 92  --   --     256 264  --   --    INR 4.09*  --  3.68*  --  3.74*    137 136  --  137   POTASSIUM 3.4 4.2 4.2  --  4.2   CHLORIDE 106 106 104  --  102   CO2 25 26 24  --  26   BUN 21 22 27  --  27   CR 1.56* 1.64* 1.49*  --  1.64*   ANIONGAP 8 6 8  --  9   QAMAR 8.6 8.5 8.6  --  8.8   GLC 94 169* 203*  --  272*   ALBUMIN 3.4 3.4 3.6   < >  --    PROTTOTAL 6.7* 6.6* 6.9   < >  --    BILITOTAL 0.7 0.6 0.7   < >  --    ALKPHOS 92 97 119   < >  --    ALT 45 49 59   < >  --    AST 29 30 37   < >  --     < > = values in this interval not displayed.

## 2020-09-20 NOTE — CONSULTS
GENERAL ID SERVICE CONSULTATION     Patient:  Jim Willingham   Date of birth 1955, Medical record number [id number]  Date of Visit:  09/20/2020  Date of Admission: 9/18/2020  Consult Requester:Nicola Seth MD          Assessment and Recommendations:   PROBLEM LIST:  1. NICM (EF 20%) s/p HM II LVAD placement (6/19/17)   2. 1 of 2 sets of blood cultures positive for Staph hominis on 9/17/20  3. 1 of 2 sets of blood cultures positive for Gram positive cocci in clusters from 9/18    RECOMMENDATION:  1. Would recommend discontinuing Pip/Tazo   2. Continue IV Vancomycin at this (Oxacillin sensitive CoNS - will need to determine if PCN allergy is real and if not can transition to a beta lactam, although given the he has tolerated piperacillin I anticipate nafcillin will also be okay)  3. Please collect daily blood cultures until negative x 72 hours  4. Although not ideal given inability to use contrast given kidney junction - would recommend a CT scan of the chest, abdomen and pelvis to see there is any evidence of a deep driveline infection or abscess that needs draining.   5. Final abx plan and duration to be determined, but would likely need 4-6 weeks of IV therapy followed by oral suppressive therapy.     ASSESSMENT: Mr. Jim Willingham is a 65 year old male with PMHx significant for NICM (EF 20%) s/p HM II LVAD placement (6/19/17) as DT due to obesity, A-fib, RV failure, CKD 3, DM2 c/b neuropathy, CECILIA, HTN, Asthma who called his LVAD coordinator on 9/17 stating that he felt horrible and as if he had been hit by a truck, reported shortness of breath at rest, light headedness, diaphoretic, and throbbing headache. He presented to the ED on 9/17, blood cultures were drawn, and he was discharged home on Levofloxacin. He returned to the ED on 9/18 after one of 2 sets of blood cultures returned positive for Staph hominis. Repeat blood culture on 9/18 (1 of 2 sets) now growing gram positive cocci in clusters.     Christine  "Diamante   Infectious Diseases   [id number]  ________________________________________________________________    Consult Question:.  Admission Diagnosis: Positive blood culture [R78.81]         History of Present Illness:     Mr. Jim Willingham is a 65 year old male with PMHx significant for NICM (EF 20%) s/p HM II LVAD placement (6/19/17) as DT due to obesity, A-fib, RV failure, CKD 3, DM2 c/b neuropathy, CECILIA, HTN, Asthma who called his LVAD coordinator on 9/17 stating that he felt horrible and as if he had been hit by a truck, reported shortness of breath at rest, light headedness, diaphoretic, and throbbing headache. He presented to the ED - per report he was \"Afebrile and nontoxic-appearing.  Hemodynamically stable.  Able to ambulate without difficulty.  Good SaO2 on room air.\" Blood cultures were drawn and he was discharged home on Levofloxacin.     One of 2 sets of blood cultures drawn in the ED resulted positive for Staph hominis and he was called back to the ED for admission. On readmission to the ED - he stated that he was not feeling clinically improved. Again noted to be afebrile on admission. He was started on Vancomycin and Pip/Tazo. Repeat blood cultures were drawn again on 9/18 and 9/19 and were negative.     Today - he does not report any headaches today. No current shortness of breath, cough, or chest pain. No abdominal pain. No diarrhea. No urinary symptoms. No rashes or ulcerations. Drive lines appears uninfected.     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            Review of Systems:   CONSTITUTIONAL:  No fevers or chills, + lethargy and weakness   EYES: negative for icterus  ENT:  negative for hearing loss, tinnitus and sore throat  RESPIRATORY:  negative for cough with sputum and dyspnea  CARDIOVASCULAR:  negative for chest pain, dyspnea  GASTROINTESTINAL:  negative for nausea, vomiting, diarrhea and " constipation  GENITOURINARY:  negative for dysuria  HEME:  No easy bruising  INTEGUMENT:  negative for rash and pruritus  NEURO:  Negative for headache         Past Medical History:     Past Medical History:   Diagnosis Date     Benign essential hypertension 5/11/2017     Cardiomyopathy, unspecified (H) 5/8/2017     CKD (chronic kidney disease) stage 3, GFR 30-59 ml/min (H) 5/11/2017     Depression 5/11/2017     Diabetes mellitus (H) 5/11/2017     H/O gastric bypass 5/11/2017     ICD (implantable cardioverter-defibrillator), biventricular, in situ 5/11/2017     LVAD (left ventricular assist device) present (H)      NICM (nonischemic cardiomyopathy) (H)/ EF 20% 5/11/2017    ECHO: LVEDd. 7.66 cm, Restrictive pattern , Severe mitral valve regurgitation     CECILIA (obstructive sleep apnea) 5/11/2017     Paroxysmal atrial fibrillation (H) 5/11/2017     Paroxysmal VT (H) 5/11/2017     Uncomplicated asthma      Vitamin B12 deficiency (non anemic) 5/11/2017            Past Surgical History:     Past Surgical History:   Procedure Laterality Date     ANESTHESIA CARDIOVERSION N/A 5/11/2020    Procedure: ANESTHESIA, FOR CARDIOVERSION @1100;  Surgeon: GENERIC ANESTHESIA PROVIDER;  Location:  OR     CV RIGHT HEART CATH N/A 7/24/2019    Procedure: CV RIGHT HEART CATH;  Surgeon: Renu Sears MD;  Location:  HEART CARDIAC CATH LAB     CV RIGHT HEART CATH N/A 8/5/2020    Procedure: CV RIGHT HEART CATH;  Surgeon: Nicola Seth MD;  Location:  HEART CARDIAC CATH LAB     GI SURGERY  2003    [facility]     INSERT VENTRICULAR ASSIST DEVICE LEFT (HEARTMATE II) N/A 6/19/2017    Procedure: INSERT VENTRICULAR ASSIST DEVICE LEFT (HEARTMATE II);  Median Sternotomy Heartmate II Left Ventricular Assist Device Insertion on Pump Oxygenator;  Surgeon: Ronnie Quigley MD;  Location:  OR     ORTHOPEDIC SURGERY  1994    right knee wired          Family History:     Family History   Problem Relation Age of Onset     Cerebrovascular Disease  Mother      Diabetes Mother      Hypertension Mother      Coronary Artery Disease Father      Diabetes Type 2  Father           Social History:     Social History     Tobacco Use     Smoking status: Former Smoker     Last attempt to quit:      Years since quittin.7     Smokeless tobacco: Never Used   Substance Use Topics     Alcohol use: No     History   Sexual Activity     Sexual activity: Yes     Partners: Female          Current Medications (antimicrobials listed in bold):       allopurinol  100 mg Oral Daily     amiodarone  400 mg Oral Daily     aspirin  81 mg Oral or Feeding Tube Daily     bumetanide  2 mg Oral BID     citalopram  30 mg Oral At Bedtime     fluticasone-vilanterol  1 puff Inhalation Daily     gabapentin  600 mg Oral At Bedtime     hydrALAZINE  30 mg Oral TID     insulin aspart  1-7 Units Subcutaneous TID AC     insulin aspart  1-5 Units Subcutaneous At Bedtime     insulin glargine  3 Units Subcutaneous QAM AC     montelukast  10 mg Oral At Bedtime     pantoprazole  40 mg Oral QAM AC     potassium chloride  20 mEq Oral Daily     predniSONE  20 mg Oral Daily     umeclidinium  1 puff Inhalation Daily     vancomycin (VANCOCIN) IV  2,000 mg Intravenous Q18H            Allergies:     Allergies   Allergen Reactions     Beer Shortness Of Breath     Grass Shortness Of Breath     Ace Inhibitors Cough     Dust Mites Other (See Comments)     Asthma     Mold Other (See Comments)     Asthma     Penicillins Other (See Comments)     Unknown - childhood exposure     Sulfa Drugs Other (See Comments) and Unknown     Unknown childhood reaction            Physical Exam:   Vitals were reviewed  Ranges for his vital signs:  Temp:  [97.6  F (36.4  C)-99  F (37.2  C)] 98.7  F (37.1  C)  Pulse:  [67-79] 74  Resp:  [16-18] 16  BP: ()/(56-96) 90/56  SpO2:  [95 %-99 %] 97 %    Physical Examination:  GENERAL:  well-developed, well-nourished, in bed in no acute distress.   HEENT:  Head is normocephalic,  atraumatic   EYES:  Eyes have anicteric sclerae without conjunctival injection or stigmata of endocarditis.    ENT:  Oropharynx is moist without exudates or ulcers. Tongue is midline  NECK:  Supple. No  Cervical lymphadenopathy  LUNGS:  Clear to auscultation bilateral.   CARDIOVASCULAR:  LVAD hum  ABDOMEN:  Normal bowel sounds, soft, nontender. No appreciable hepatosplenomegaly. Drive line without any erythema or drainage   SKIN:  No acute rashes.    NEUROLOGIC:  Grossly nonfocal. Active x4 extremities         Laboratory Data:     Inflammatory Markers    Recent Labs   Lab Test 07/06/18  0856 04/13/18  1437 12/19/17  1002 05/26/17  0700 05/25/17  0742 05/24/17  0652 05/23/17  1157   SED  --  18  --  25* 21* 17  --    CRP 26.0* 9.4* 5.7  --   --   --  29.0*       Hematology Studies    Recent Labs   Lab Test 09/20/20 0448 09/19/20  0530 09/18/20  1713 09/17/20  1554 09/15/20  1040 09/10/20  0830 08/24/20  0900 08/17/20  1025  08/05/20  0335   WBC 4.8 7.1 5.6 6.3  --  8.8 5.8 7.6   < > 8.0   ANEU  --  4.5 4.8 5.6  --   --  3.3 5.3  --  6.8   AEOS  --  0.1 0.0 0.0  --   --  0.2 0.0  --  0.0   HGB 8.7* 9.5* 9.3* 9.7* 9.7* 9.7* 10.3* 10.4*   < > 13.1*   MCV 92 93 91 92  --  89 95 93   < > 86    249 256 264  --  262 225 209   < > 276    < > = values in this interval not displayed.     Metabolic Studies     Recent Labs   Lab Test 09/20/20 0448 09/19/20  0530 09/18/20  1713 09/17/20  1554 09/15/20  1040    138 137 136 137   POTASSIUM 4.1 3.4 4.2 4.2 4.2   CHLORIDE 106 106 106 104 102   CO2 26 25 26 24 26   BUN 17 21 22 27 27   CR 1.42* 1.56* 1.64* 1.49* 1.64*   GFRESTIMATED 52* 46* 44* 49* 44*     Hepatic Studies    Recent Labs   Lab Test 09/19/20  0530 09/18/20  1713 09/17/20  1554 09/10/20  0830 08/10/20  1610 08/05/20  0335   BILITOTAL 0.7 0.6 0.7 0.8 0.8 0.9   ALKPHOS 92 97 119 94 69 71   ALBUMIN 3.4 3.4 3.6 3.8 3.7 4.0   AST 29 30 37 42 39 34   ALT 45 49 59 48 39 34     Thyroid Studies    Recent Labs   Lab  Test 06/24/20  0747 07/31/19  1050   TSH 1.30 1.23     Microbiology:  Culture Micro   Date Value Ref Range Status   09/19/2020 No growth after 1 day  Preliminary   09/18/2020 No growth after 2 days  Preliminary   09/18/2020 No growth after 2 days  Preliminary   09/17/2020 (A)  Final    Cultured on the 1st day of incubation:  Staphylococcus hominis     09/17/2020   Final    Critical Value/Significant Value, preliminary result only, called to and read back by  John Howard RN @1414 09/18/2020 [facility]/     09/17/2020   Final    (Note)  POSITIVE for Staphylococci other than S.aureus, S.epidermidis and  S.lugdunensis, by Sellbrite multiplex nucleic acid test.  Coagulase-negative staphylococci are the most common venipuncture or  collection associated skin CONTAMINANTS grown in blood cultures.  Final identification and antimicrobial susceptibility testing will be  verified by standard methods.    Specimen tested with Verigene multiplex, gram-positive blood culture  nucleic acid test for the following targets: Staph aureus, Staph  epidermidis, Staph lugdunensis, other Staph species, Enterococcus  faecalis, Enterococcus faecium, Streptococcus species, S. agalactiae,  S. anginosus grp., S. pneumoniae, S. pyogenes, Listeria sp., mecA  (methicillin resistance) and Lucía/B (vancomycin resistance).    Critical Value/Significant Value called to and read back by Le Carmona RN. @1712. 9.18.20. BS.      09/17/2020 No growth after 3 days  Preliminary   01/21/2020 No growth  Final   01/21/2020 (A)  Final    Heavy growth  Haemophilus influenzae  Beta lactamase negative  Beta-lactamase negative Haemophilus influenzae are usually susceptible to ampicillin,   amoxacillin/clavulanic acid, levofloxacin, and 3rd generation cephalosporins, such as   ceftriaxone.     01/20/2020 No growth  Final   01/20/2020 No growth  Final   01/16/2020 (A)  Final    10,000 to 50,000 colonies/mL  Coagulase negative Staphylococcus     01/15/2020 No growth   Final   01/15/2020 (A)  Final    Cultured on the 1st day of incubation:  Staphylococcus epidermidis     01/15/2020   Final    Critical Value/Significant Value, preliminary result only, called to and read back by  Alphonse Cuba RN @ 1920. 20. AV     01/15/2020   Final    (Note)  POSITIVE for STAPHYLOCOCCUS EPIDERMIDIS and NEGATIVE for the mecA  gene (not resistant to methicillin) by Verigene nucleic acid test.  The mecA gene was not detected. Final identification and  antimicrobial susceptibility testing will be verified by standard  methods.    Specimen tested with Verigene multiplex, gram-positive blood culture  nucleic acid test for the following targets: Staph aureus, Staph  epidermidis, Staph lugdunensis, other Staph species, Enterococcus  faecalis, Enterococcus faecium, Streptococcus species, S. agalactiae,  S. anginosus grp., S. pneumoniae, S. pyogenes, Listeria sp., mecA  (methicillin resistance) and Lucía/B (vancomycin resistance).    Critical Value/Significant Value called to and read back by Dr. Leos, @2214 20..     2018 No growth  Final   2018 No growth  Final   2017 No growth  Final   2017 Moderate growth Normal jaxon  Final     Urine Studies    Recent Labs   Lab Test 09/10/20  0921 20  0503 19  0142 17  2100   LEUKEST Negative Negative Negative Negative   WBCU 0 - 5 <1  --  <1     Imagin/18: CXR   Findings:   AP portable view of the chest. Implantable cardiac device and LVAD in  similar position to previous. Sternotomy wires present. Stable  cardiomegaly. Upper abdomen is unremarkable. No significant pleural  effusion. No pneumothorax. Stable mixed interstitial/airspace  opacities in the lungs. Heart size is upper limits of normal. Aortic  arch atherosclerosis.                                                                  Impression: Unchanged pulmonary edema. Underlying infection not  completely excluded. LVAD. Implantable  cardiac defibrillator.  Borderline cardiomegaly. Aortic atherosclerosis.

## 2020-09-20 NOTE — PLAN OF CARE
D:  Patient was admitted on 9/18/2029 after blood cultures were positive. PMHx significant for NICM (EF 20%) s/p HM II LVAD placement (6/19/17) as DT due to obesity, A-fib, RV failure, CKD 3, DM2 c/b neuropathy, CECILIA, HTN, Asthma who is admitted with bacteremia.     I: Monitored vitals and assessed patient status.   Running: piv that is saline locked  PRN: Tylenol 650 every 4 hours and had it at 09:51 for a headache    A: Patient's vital signs and LVAD values have been stable except his LVAD flow was 7.1 at  noon. His rhythm was SR 60-70 with a bundle branch block , 0-11 PVC's/min, a rare PVC couplet and triplet, and a rare PAC couplet    I/O this shift:  In: 1530 [P.O.:720; I.V.:810]  Out: 1050 [Urine:1050]    Temp:  [97.5  F (36.4  C)-99  F (37.2  C)] 99  F (37.2  C)  Pulse:  [67-81] 72  Resp:  [16-18] 18  BP: ()/(64-96) 94/80  SpO2:  [95 %-99 %] 99 %      P: Continue to monitor patient status and report changes to the Cards 2 treatment team.

## 2020-09-20 NOTE — PLAN OF CARE
[id number]    BP 96/86 (BP Location: Right arm)   Pulse 70   Temp 97.6  F (36.4  C) (Oral)   Resp 18   Wt 107.2 kg (236 lb 4.8 oz)   SpO2 95%   BMI 35.93 kg/m      D: Patient was admitted after blood culture positive.  Patient is here for treatment of bacteremia.  I/A: Patient did complete dose of Levaquin and now on Vanco and Zosyn.  PJ=987 and novolog 1-unit given.  Patient denies pain and nausea and did sleep between cares.    P: Will continue with monitor and notify provider with status changes.

## 2020-09-20 NOTE — PLAN OF CARE
Discharge Planner OT   Patient plan for discharge: Home   Current status: OT eval completed, treatment indicated. Educated pt on OT role and POC. Pt declined OOB/EOB activity 2/2 breakfast and online [employer]. Pt endorsed fatigue ambulating to/from bathroom, possibly 2/2 increased fluid. Educated pt on EC/WC strategies to implement w/activit.   Barriers to return to prior living situation: medical status, decreased act brigid   Recommendations for discharge: Home w/Assist PRN   Rationale for recommendations: Pt to benefit from skilled OT to progress overall CV endurance for daily activities        Entered by: Ronit Natarajan 09/20/2020 9:10 AM

## 2020-09-21 ENCOUNTER — APPOINTMENT (OUTPATIENT)
Dept: ULTRASOUND IMAGING | Facility: CLINIC | Age: 65
DRG: 871 | End: 2020-09-21
Payer: COMMERCIAL

## 2020-09-21 ENCOUNTER — APPOINTMENT (OUTPATIENT)
Dept: CT IMAGING | Facility: CLINIC | Age: 65
DRG: 871 | End: 2020-09-21
Attending: STUDENT IN AN ORGANIZED HEALTH CARE EDUCATION/TRAINING PROGRAM
Payer: COMMERCIAL

## 2020-09-21 ENCOUNTER — HOME INFUSION (PRE-WILLOW HOME INFUSION) (OUTPATIENT)
Dept: PHARMACY | Facility: CLINIC | Age: 65
End: 2020-09-21

## 2020-09-21 LAB
ANION GAP SERPL CALCULATED.3IONS-SCNC: 2 MMOL/L (ref 3–14)
ANION GAP SERPL CALCULATED.3IONS-SCNC: 3 MMOL/L (ref 3–14)
BUN SERPL-MCNC: 20 MG/DL (ref 7–30)
BUN SERPL-MCNC: 22 MG/DL (ref 7–30)
CALCIUM SERPL-MCNC: 7.8 MG/DL (ref 8.5–10.1)
CALCIUM SERPL-MCNC: 8.6 MG/DL (ref 8.5–10.1)
CHLORIDE SERPL-SCNC: 108 MMOL/L (ref 94–109)
CHLORIDE SERPL-SCNC: 109 MMOL/L (ref 94–109)
CO2 SERPL-SCNC: 28 MMOL/L (ref 20–32)
CO2 SERPL-SCNC: 30 MMOL/L (ref 20–32)
CREAT SERPL-MCNC: 1.52 MG/DL (ref 0.66–1.25)
CREAT SERPL-MCNC: 1.56 MG/DL (ref 0.66–1.25)
ERYTHROCYTE [DISTWIDTH] IN BLOOD BY AUTOMATED COUNT: 17.1 % (ref 10–15)
GFR SERPL CREATININE-BSD FRML MDRD: 46 ML/MIN/{1.73_M2}
GFR SERPL CREATININE-BSD FRML MDRD: 48 ML/MIN/{1.73_M2}
GLUCOSE SERPL-MCNC: 117 MG/DL (ref 70–99)
GLUCOSE SERPL-MCNC: 97 MG/DL (ref 70–99)
HCT VFR BLD AUTO: 30.1 % (ref 40–53)
HGB BLD-MCNC: 8.6 G/DL (ref 13.3–17.7)
INR PPP: 3.41 (ref 0.86–1.14)
LDH SERPL L TO P-CCNC: 295 U/L (ref 85–227)
MAGNESIUM SERPL-MCNC: 2.2 MG/DL (ref 1.6–2.3)
MAGNESIUM SERPL-MCNC: 2.3 MG/DL (ref 1.6–2.3)
MCH RBC QN AUTO: 26.5 PG (ref 26.5–33)
MCHC RBC AUTO-ENTMCNC: 28.6 G/DL (ref 31.5–36.5)
MCV RBC AUTO: 93 FL (ref 78–100)
PLATELET # BLD AUTO: 206 10E9/L (ref 150–450)
POTASSIUM SERPL-SCNC: 3.3 MMOL/L (ref 3.4–5.3)
POTASSIUM SERPL-SCNC: 4.2 MMOL/L (ref 3.4–5.3)
POTASSIUM SERPL-SCNC: 4.3 MMOL/L (ref 3.4–5.3)
RBC # BLD AUTO: 3.25 10E12/L (ref 4.4–5.9)
SODIUM SERPL-SCNC: 139 MMOL/L (ref 133–144)
SODIUM SERPL-SCNC: 140 MMOL/L (ref 133–144)
VANCOMYCIN SERPL-MCNC: 22.1 MG/L
WBC # BLD AUTO: 6.2 10E9/L (ref 4–11)

## 2020-09-21 PROCEDURE — 84132 ASSAY OF SERUM POTASSIUM: CPT | Performed by: STUDENT IN AN ORGANIZED HEALTH CARE EDUCATION/TRAINING PROGRAM

## 2020-09-21 PROCEDURE — 25000128 H RX IP 250 OP 636

## 2020-09-21 PROCEDURE — 80048 BASIC METABOLIC PNL TOTAL CA: CPT | Performed by: STUDENT IN AN ORGANIZED HEALTH CARE EDUCATION/TRAINING PROGRAM

## 2020-09-21 PROCEDURE — 85610 PROTHROMBIN TIME: CPT

## 2020-09-21 PROCEDURE — 25000132 ZZH RX MED GY IP 250 OP 250 PS 637: Performed by: STUDENT IN AN ORGANIZED HEALTH CARE EDUCATION/TRAINING PROGRAM

## 2020-09-21 PROCEDURE — 80202 ASSAY OF VANCOMYCIN: CPT | Performed by: INTERNAL MEDICINE

## 2020-09-21 PROCEDURE — 36415 COLL VENOUS BLD VENIPUNCTURE: CPT | Performed by: STUDENT IN AN ORGANIZED HEALTH CARE EDUCATION/TRAINING PROGRAM

## 2020-09-21 PROCEDURE — 71250 CT THORAX DX C-: CPT

## 2020-09-21 PROCEDURE — 25000128 H RX IP 250 OP 636: Performed by: INTERNAL MEDICINE

## 2020-09-21 PROCEDURE — 87040 BLOOD CULTURE FOR BACTERIA: CPT | Performed by: STUDENT IN AN ORGANIZED HEALTH CARE EDUCATION/TRAINING PROGRAM

## 2020-09-21 PROCEDURE — 93970 EXTREMITY STUDY: CPT

## 2020-09-21 PROCEDURE — 83735 ASSAY OF MAGNESIUM: CPT | Performed by: STUDENT IN AN ORGANIZED HEALTH CARE EDUCATION/TRAINING PROGRAM

## 2020-09-21 PROCEDURE — 36415 COLL VENOUS BLD VENIPUNCTURE: CPT | Performed by: INTERNAL MEDICINE

## 2020-09-21 PROCEDURE — 99233 SBSQ HOSP IP/OBS HIGH 50: CPT | Mod: 25 | Performed by: INTERNAL MEDICINE

## 2020-09-21 PROCEDURE — 25000131 ZZH RX MED GY IP 250 OP 636 PS 637: Performed by: STUDENT IN AN ORGANIZED HEALTH CARE EDUCATION/TRAINING PROGRAM

## 2020-09-21 PROCEDURE — 40000962 ZZH STATISTIC CHRONIC DISEASE SPECIALIST RT CONSULT

## 2020-09-21 PROCEDURE — 25000132 ZZH RX MED GY IP 250 OP 250 PS 637

## 2020-09-21 PROCEDURE — 93750 INTERROGATION VAD IN PERSON: CPT | Performed by: INTERNAL MEDICINE

## 2020-09-21 PROCEDURE — 25800030 ZZH RX IP 258 OP 636: Performed by: INTERNAL MEDICINE

## 2020-09-21 PROCEDURE — 93930 UPPER EXTREMITY STUDY: CPT

## 2020-09-21 PROCEDURE — 25800030 ZZH RX IP 258 OP 636

## 2020-09-21 PROCEDURE — 21400000 ZZH R&B CCU UMMC

## 2020-09-21 PROCEDURE — 25000128 H RX IP 250 OP 636: Performed by: STUDENT IN AN ORGANIZED HEALTH CARE EDUCATION/TRAINING PROGRAM

## 2020-09-21 PROCEDURE — 85027 COMPLETE CBC AUTOMATED: CPT | Performed by: STUDENT IN AN ORGANIZED HEALTH CARE EDUCATION/TRAINING PROGRAM

## 2020-09-21 PROCEDURE — 83615 LACTATE (LD) (LDH) ENZYME: CPT | Performed by: STUDENT IN AN ORGANIZED HEALTH CARE EDUCATION/TRAINING PROGRAM

## 2020-09-21 PROCEDURE — 25000132 ZZH RX MED GY IP 250 OP 250 PS 637: Performed by: INTERNAL MEDICINE

## 2020-09-21 RX ORDER — POTASSIUM CHLORIDE 750 MG/1
40 TABLET, EXTENDED RELEASE ORAL ONCE
Status: COMPLETED | OUTPATIENT
Start: 2020-09-21 | End: 2020-09-21

## 2020-09-21 RX ORDER — POTASSIUM CHLORIDE 1.5 G/1.58G
20-40 POWDER, FOR SOLUTION ORAL
Status: DISCONTINUED | OUTPATIENT
Start: 2020-09-21 | End: 2020-09-27 | Stop reason: HOSPADM

## 2020-09-21 RX ORDER — POTASSIUM CHLORIDE 750 MG/1
20-40 TABLET, EXTENDED RELEASE ORAL
Status: DISCONTINUED | OUTPATIENT
Start: 2020-09-21 | End: 2020-09-27 | Stop reason: HOSPADM

## 2020-09-21 RX ORDER — POTASSIUM CL/LIDO/0.9 % NACL 10MEQ/0.1L
10 INTRAVENOUS SOLUTION, PIGGYBACK (ML) INTRAVENOUS
Status: DISCONTINUED | OUTPATIENT
Start: 2020-09-21 | End: 2020-09-27 | Stop reason: HOSPADM

## 2020-09-21 RX ORDER — POTASSIUM CHLORIDE 29.8 MG/ML
20 INJECTION INTRAVENOUS
Status: DISCONTINUED | OUTPATIENT
Start: 2020-09-21 | End: 2020-09-27 | Stop reason: HOSPADM

## 2020-09-21 RX ORDER — BUMETANIDE 0.25 MG/ML
4 INJECTION INTRAMUSCULAR; INTRAVENOUS ONCE
Status: COMPLETED | OUTPATIENT
Start: 2020-09-21 | End: 2020-09-21

## 2020-09-21 RX ORDER — ALLOPURINOL 100 MG/1
200 TABLET ORAL DAILY
Status: DISCONTINUED | OUTPATIENT
Start: 2020-09-22 | End: 2020-09-27 | Stop reason: HOSPADM

## 2020-09-21 RX ORDER — POTASSIUM CHLORIDE 750 MG/1
40 TABLET, EXTENDED RELEASE ORAL 2 TIMES DAILY
Status: DISCONTINUED | OUTPATIENT
Start: 2020-09-21 | End: 2020-09-27 | Stop reason: HOSPADM

## 2020-09-21 RX ORDER — POTASSIUM CHLORIDE 7.45 MG/ML
10 INJECTION INTRAVENOUS
Status: DISCONTINUED | OUTPATIENT
Start: 2020-09-21 | End: 2020-09-27 | Stop reason: HOSPADM

## 2020-09-21 RX ADMIN — UMECLIDINIUM 1 PUFF: 62.5 AEROSOL, POWDER ORAL at 07:48

## 2020-09-21 RX ADMIN — POTASSIUM CHLORIDE 40 MEQ: 750 TABLET, EXTENDED RELEASE ORAL at 11:09

## 2020-09-21 RX ADMIN — VANCOMYCIN HYDROCHLORIDE 1750 MG: 10 INJECTION, POWDER, LYOPHILIZED, FOR SOLUTION INTRAVENOUS at 22:12

## 2020-09-21 RX ADMIN — FLUTICASONE FUROATE AND VILANTEROL TRIFENATATE 1 PUFF: 200; 25 POWDER RESPIRATORY (INHALATION) at 07:49

## 2020-09-21 RX ADMIN — BUMETANIDE 2 MG: 2 TABLET ORAL at 17:25

## 2020-09-21 RX ADMIN — TRAMADOL HYDROCHLORIDE 50 MG: 50 TABLET, FILM COATED ORAL at 07:59

## 2020-09-21 RX ADMIN — ACETAMINOPHEN 650 MG: 325 TABLET, FILM COATED ORAL at 07:59

## 2020-09-21 RX ADMIN — INSULIN ASPART 2 UNITS: 100 INJECTION, SOLUTION INTRAVENOUS; SUBCUTANEOUS at 12:04

## 2020-09-21 RX ADMIN — ACETAMINOPHEN 650 MG: 325 TABLET, FILM COATED ORAL at 03:04

## 2020-09-21 RX ADMIN — PREDNISONE 20 MG: 20 TABLET ORAL at 07:40

## 2020-09-21 RX ADMIN — AMIODARONE HYDROCHLORIDE 400 MG: 200 TABLET ORAL at 07:41

## 2020-09-21 RX ADMIN — POTASSIUM CHLORIDE 40 MEQ: 750 TABLET, EXTENDED RELEASE ORAL at 21:02

## 2020-09-21 RX ADMIN — INSULIN GLARGINE 3 UNITS: 100 INJECTION, SOLUTION SUBCUTANEOUS at 07:44

## 2020-09-21 RX ADMIN — CITALOPRAM HYDROBROMIDE 30 MG: 20 TABLET ORAL at 21:14

## 2020-09-21 RX ADMIN — GABAPENTIN 600 MG: 300 CAPSULE ORAL at 21:14

## 2020-09-21 RX ADMIN — VANCOMYCIN HYDROCHLORIDE 2000 MG: 10 INJECTION, POWDER, LYOPHILIZED, FOR SOLUTION INTRAVENOUS at 02:58

## 2020-09-21 RX ADMIN — POTASSIUM CHLORIDE 40 MEQ: 750 TABLET, EXTENDED RELEASE ORAL at 07:40

## 2020-09-21 RX ADMIN — HYDRALAZINE HYDROCHLORIDE 30 MG: 10 TABLET, FILM COATED ORAL at 07:40

## 2020-09-21 RX ADMIN — HYDRALAZINE HYDROCHLORIDE 30 MG: 10 TABLET, FILM COATED ORAL at 21:01

## 2020-09-21 RX ADMIN — ALLOPURINOL 100 MG: 100 TABLET ORAL at 07:40

## 2020-09-21 RX ADMIN — PANTOPRAZOLE SODIUM 40 MG: 40 TABLET, DELAYED RELEASE ORAL at 07:40

## 2020-09-21 RX ADMIN — HYDRALAZINE HYDROCHLORIDE 30 MG: 10 TABLET, FILM COATED ORAL at 14:07

## 2020-09-21 RX ADMIN — BUMETANIDE 2 MG: 2 TABLET ORAL at 07:40

## 2020-09-21 RX ADMIN — MONTELUKAST 10 MG: 10 TABLET, FILM COATED ORAL at 21:14

## 2020-09-21 RX ADMIN — TRAMADOL HYDROCHLORIDE 50 MG: 50 TABLET, FILM COATED ORAL at 21:02

## 2020-09-21 RX ADMIN — INSULIN ASPART 3 UNITS: 100 INJECTION, SOLUTION INTRAVENOUS; SUBCUTANEOUS at 17:25

## 2020-09-21 RX ADMIN — BUMETANIDE 4 MG: 0.25 INJECTION INTRAMUSCULAR; INTRAVENOUS at 11:10

## 2020-09-21 RX ADMIN — ACETAMINOPHEN 650 MG: 325 TABLET, FILM COATED ORAL at 21:01

## 2020-09-21 RX ADMIN — INSULIN ASPART 1 UNITS: 100 INJECTION, SOLUTION INTRAVENOUS; SUBCUTANEOUS at 07:46

## 2020-09-21 ASSESSMENT — PAIN DESCRIPTION - DESCRIPTORS
DESCRIPTORS: SORE
DESCRIPTORS: ACHING
DESCRIPTORS: DISCOMFORT
DESCRIPTORS: ACHING

## 2020-09-21 ASSESSMENT — ACTIVITIES OF DAILY LIVING (ADL)
ADLS_ACUITY_SCORE: 12
ADLS_ACUITY_SCORE: 10
ADLS_ACUITY_SCORE: 10
ADLS_ACUITY_SCORE: 12
ADLS_ACUITY_SCORE: 12
ADLS_ACUITY_SCORE: 10

## 2020-09-21 NOTE — PLAN OF CARE
D: Patient admitted 9/18 w/bacteremia. Hx. NICM s/p HM2 LVAD (6/2017) as DT d/t obesity, Afib, RV failure, CKD3, DM2 c/b neuropathy. CECILIA, HTN, asthma.  I/A: A&Ox4. VSS on RA. RESENDIZ. Afebrile. SR 60s-70s. C/o generalized body aching and chronic L shoulder pain partially relieved by prn tylenol and tramadol. Abx infused per orders.  overnight. Adequate uop. Up ad francisco. Appeared to rest comfortably overnight.   P: Continue to monitor and notify Cards 2 with questions/concerns.     AM K+ 3.3, no replacement protocol, Cards CC paged.

## 2020-09-21 NOTE — PROGRESS NOTES
[facility] SIGN OFF NOTE     Patient:  Jim Willingham   Date of birth 1955, Medical record number [id number]  Date of Visit:  09/22/2020  Date of Admission: 9/18/2020  Consult Requester:Nicola Seth MD          Assessment and Recommendations:   PROBLEM LIST:  1. NICM (EF 20%) s/p HM II LVAD placement (6/19/17)   2. Staph hominis bacteremia 9/17, 9/18  4. Reported PCN and sulfa allergies; tolerated zosyn  5. CKD    RECOMMENDATION:  1. Continue vancomycin, goal trough 15-20.  2. Okay to stop daily blood cultures.   3. Okay to place PICC tomorrow, assuming no new positive blood cultures.    4. Please continue vancomycin until follow up with me in the ID clinic on 10/22 at 2pm (I have arranged for this).   5. While on IV abx, please check CBC and CMP and have results faxed to me in the [facility] ID clinic.     ASSESSMENT: Mr. Jim Willingham is a 65 year old male with PMHx significant for NICM (EF 20%) s/p HM II LVAD placement (6/19/17) as DT due to obesity, A-fib, CKD 3, and DM2 who developed CoNS bacteremia. He appears to have cleared his cultures and has no obvious drainable foci of infection based on imaging (although non-contrast scan). Although his isolate is oxacillin sensitive (unusual for CoNS), reasonable to keep him on vancomycin given ease of dosing. I will see him in clinic in ~4 weeks at which time I will likely transition him over to oral suppressive therapy.      ID will sign off but don't hesitate to call with questions.     Margaret Sinclair MD  [phone]    ________________________________________________________________           Interval History:   Consistently afebrile, WBC normal. Feeling better and better each day. Energy still not back to normal but getting there. Deneis fevers, chills, pain. Excited to get home to see his granddaughter.          Review of Systems:   9 pt ROS obtained; pertinent positives and negatives as above.          Antimicrobials:   Vancomycin,  9/18-    Prior:  Levofloxacin 9/18  Zosyn 9/18-9/20         Allergies:     Allergies   Allergen Reactions     Beer Shortness Of Breath     Grass Shortness Of Breath     Ace Inhibitors Cough     Dust Mites Other (See Comments)     Asthma     Mold Other (See Comments)     Asthma     Penicillins Other (See Comments)     Unknown - childhood exposure     Sulfa Drugs Other (See Comments) and Unknown     Unknown childhood reaction            Physical Exam:   Vitals were reviewed  Ranges for his vital signs:  Temp:  [97.7  F (36.5  C)-99  F (37.2  C)] 97.7  F (36.5  C)  Pulse:  [64-81] 64  Resp:  [16-18] 16  BP: ()/(56-96) 107/95  SpO2:  [95 %-99 %] 95 %  GENERAL:  well-developed, well-nourished, in bed in no acute distress.   HEENT:  Head is normocephalic, atraumatic   EYES:  Eyes have anicteric sclerae without conjunctival injection.    NECK:  Supple.   LUNGS: Breathing comfortably on RA  ABDOMEN:  Nondistended, nontender, LVAD exit site not examined.   SKIN:  No acute rashes.    NEUROLOGIC:  Grossly nonfocal. Active x4 extremities         Laboratory Data:   Reviewed. Pertinent for:  Normal WBC  Hgb 9.1  Plts 229  Cr 1.49 (baseline 1.2-1.4)    Micro studies:  Blood  9/17 (1/2) Staph hominis; R clinda, erythromycin, tetracycline  9/18 (1/2) Staph hominis  9/19 (1/1) NGTD  9/20 (1/1) NGTD  9/21 (1/1) NGTD  9/22 (1/1) NGTD        Imaging:   noncontrast CT 9/21:  1. Stable small amount of stranding around the driveline as it enters  the rectus abdominis muscle. No identifiable fluid collection suggests  driveline infection. LVAD appears otherwise normal on this noncontrast  exam.   2. Subsolid nodule measuring 6 mm in the left upper lobe and a  groundglass nodule in the right upper lobe measuring 23 mm. These are  likely infectious and there short interval development since 8/5/2020.  3. Cholelithiasis.

## 2020-09-21 NOTE — PLAN OF CARE
D: Admitted s/p bacteremia   PMH of NICM (EF 20%) s/p HMII LVAD placement (6/9/2017), obsesity, A-fib, RV failure, CKD 3, DM2 c/b neuropathy, CECILIA, HTN, Asthma     I: Monitored vitals and assessed pt status.     PRN: Tylenol, Tramadol      A: Pt A0x4. Afebrile. VSS. HRs 60s-70s. Sinus rhythm. Sats >92% on RA. Pt denies any shortness of breath at rest, chest pain/palpitation, nausea, dizziness, or chills. Dyspnea on exertion. Tylenol and Tramadol given for generalized pain w/relief. HM2 LVAD. Numbers WNL, no alarms during this shift. Pt has transparent dressing due for weekly change today. Pt is waiting for wife to bring in weekly kit. Driveline site WNL. BGs before meals, pt uses own Dexcom scanner. MD notified and aware of pt's own use of BG readings. Pt on 2g Na+ diet w/carb counts. K+ 3.3, MD aware, 40mEq x2 given to pt, recheck of 4.3. Pt up independently.      P: NPO @ Midnight for ultrasound abdomen tomorrow. Possible PICC placement for antibiotic treatment. Continue to monitor pt status and notify Cards 2 treatment team with any changes.

## 2020-09-21 NOTE — PROGRESS NOTES
"  Care Coordinator Progress Note    Admission Date/Time:  9/18/2020  Attending MD:  Nicola Seth MD    Data  Chart reviewed, discussed with interdisciplinary team.   Patient with history of LVAD,     Assessment  Concerns with insurance coverage for discharge needs: None.  Current Living Situation: Patient lives with his wife, sister in law and adopted 7 year old granddaughter.   Support System: Supportive and Involved  Services Involved: [facility] Med Monitoring  Transportation at Discharge: Family or friend will provide  Transportation to Medical Appointments: Spouse  Barriers to Discharge: Medical plan of care, infectious disease plan    Coordination of Care and Referrals: Provided patient/family with options for home infusion. Per ID, pt will likely need a 4-6 week course of IV antibiotics. Pt states he has never done home infusion, but feels he would be willing and able to learn. Pt states that he would need to be able to manage independently and that his wife could be trained at home if she is willing. Pt reported multiple stressors including being \"somewhat estranged\" from his wife and continued anxiety regarding the Covid 19 pandemic, but reports that he has been doing virtual counseling appointments which has helped him significantly. This writer provided supportive listening and encouraged pt to continue discussing any concerns with his care team. The team is seeing if parts of his ongoing transplant workup can be completed while he is inpatient. This writer discussed referral process to [facility] to check for home infusion coverage and to offer choice of agency.     Intervention  Referral made to [facility] to check coverage for home IV antibiotics.  PLC consult placed for PICC/IV med education.    Plan  Anticipated Discharge Date: 9/23/2020  Anticipated Discharge Plan: Discharge to home with home infusion  CC will continue to monitor patient's medical condition and progress towards " discharge.  Rin Ceballos RN BSN  6C Unit Care Coordinator  Phone number: [phone]  Pager: [phone]

## 2020-09-21 NOTE — PROGRESS NOTES
[facility]     Received referral from Michelle Ceballos RNCC for IV antibiotics.  Benefits verified.  Pt has [employer] and is covered 100% as his deductible and out of pocket have been met in full.  Spoke with patient at bedside to review home infusion services, review benefits and offer choice of providers.  Patient would like to use [facility] for home infusion.     Jim has not done home IV therapy before and will need initial teaching either here or at home after discharge.  Informed patient about possible administration methods, and explained that liaison or a home nurse will provide additional teaching needed for self-administration once final antibiotic plan is known.  Informed him about supplies and delivery of supplies, storage of medication, plan for SNV and 24/7 availability of I staff while on IV therapy.     Jim verbalized understanding of all information given.  He is willing and able to learn and manage home IV therapy.  Questions answered.  Will continue to follow until dc and update pt once final orders are determined.    REGINALD Palomino  [facility] Nurse Liaison   [email or web address]  Cell: [phone] M-F  [facility] Main: [phone]

## 2020-09-21 NOTE — CONSULTS
"  Rheumatology Consult Note-MS4    Jim Willingham MRN# [id number]   Age: 65 year old YOB: 1955     Date of Admission: 9/18/2020    Reason for consult: \"patient reports being on 20mg of prednisone for 3 years due to gout arthritis but not managed by rheum; he is not on bactrim; would like to address need for steroids on this patient\"  Requesting Physician: Dr. Nicola Seth    This visit was completed in person.    Assessment & Plan:     ASSESSMENT:  Jim Willingham is a 65 year old male with a hx of gout, NICM (EF20%) s/p LVAD placement (6/19/17) d/t obesity, A fib, RV failure, CKD3, DM2 c/b neuropathy, CECILIA, HTN, asthma who presents with a few days of myalgias, chills, and headaches.     Mr. Willingham does not seem to be struggling with management of his gout flares ever since he was started on allopurinol 100 mg daily three years ago. However, his most recent uric acid level checked on 01/21/20 was elevated at 9. Per the American College of Rheumatology guidelines for the management of gout, for patients with concomitant CKD, their uric acid levels should be at or below 5. Thus, this patient is likely not on a high enough allopurinol dose. However, he does not need to be on such high levels of oral prednisone for gout management. Per chart review and patient report, it seems as though he has been placed on chronic oral prednisone therapy for management of severe CTS. Per UpToDate, oral glucocorticoids appear to be effective for short-term improvement of CTS symptoms, but for long term treatment of CTS, glucocorticoid injection is more effective. Outpatient follow up for appropriate management of CTS will be important.    DIAGNOSIS:  1. Staph hominis bacteremia  2. Chronic gout    PLAN:  - Increase allopurinol to 200 mg/day  - Wean prednisone to 15 mg tomorrow morning (9/22). Taper to 10 mg on 9/29 and 5 mg on 10/6  - Avoid NSAIDs  - Please let us know when patient is discharging. Will set up rheumatology " outpatient follow up with Dr. Pettit    I discussed the findings and recommendations with the patient.  I communicated the assessment and plan to the consulting team.    Case seen and discussed with Dr. Morrow who agrees with above assessment and plan.     Thank you Nicola Seth MD for for this interesting consult. Rheumatology will sign off at this point. Please contact us if there are any questions.     Kelsey Lazaro MS4    Staff Addendum:  This patient was interviewed and examined in the presence of the medical student who was acting as a scribe, and this note personally edited by me reflects our mutual impression. I personally performed the history and physical exam. I personally reviewed the available lab and imaging data.    Agree with the plan above, though to clarify, once he gets down to 5mg of prednisone daily with this taper, should stay on this dose until follow-up.   Elbert Pettit MD  Rheumatology    HPI     Jim Willingham is a 65 year old with a hx of gout, NICM (EF20%) s/p LVAD placement (6/19/17) d/t obesity, A fib, RV failure, CKD3, DM2 c/b neuropathy, CECILIA, HTN, asthma who presents with a few days of myalgias, chills, and headaches. Patient states that he hasn't had a gout flare in more than 3 years, ever since he was started on allopurinol. When he does have a flare, it is usually in his big toes on his feet. He states that he has been taking 20mg prednisone every day for 5 years for his carpal tunnel syndrome, which was prescribed by his PCP Dr. Salas (per chart review). He has tried to taper himself off of the 20mg steroids a number of times, most recently about two months ago, and will notice that his hand will get much more tingly, numb, and painful. The pain in his hands improves only with combination therapy with gabapentin and steroids. He denies taking oral prednisone for his respiratory issues, although notes that he has occasionally been increased on his prednisone dose for  flares of his respiratory disease.    Per chart review, Mr. Willingham was hospitalized for LVAD placement in June 2017, at which point he had a gout flair and was seen my rheumatology consult service and was discharged on 40 mg prednisone for 5 days as needed for a flair in addition to allopurinol and colchicine.; He was then seen on 7/25/17 for f/u for a gout flair that started on 7/17/20 and was prescribed a 20 mg prednisone dose for 3 days, to be tapered to 10 mg for 3 days and then 5 mg for 3 days.    Patient denies any vision changes, headaches, focal weakness or numbness.  No hematuria or hematochezia.      Treatment History  - Allopurinol 100 mg daily  - Prednisone 20 mg daily    HISTORY REVIEW:  Past Medical History:   Diagnosis Date     Benign essential hypertension 5/11/2017     Cardiomyopathy, unspecified (H) 5/8/2017     CKD (chronic kidney disease) stage 3, GFR 30-59 ml/min (H) 5/11/2017     Depression 5/11/2017     Diabetes mellitus (H) 5/11/2017     H/O gastric bypass 5/11/2017     ICD (implantable cardioverter-defibrillator), biventricular, in situ 5/11/2017     LVAD (left ventricular assist device) present (H)      NICM (nonischemic cardiomyopathy) (H)/ EF 20% 5/11/2017    ECHO: LVEDd. 7.66 cm, Restrictive pattern , Severe mitral valve regurgitation     CECILIA (obstructive sleep apnea) 5/11/2017     Paroxysmal atrial fibrillation (H) 5/11/2017     Paroxysmal VT (H) 5/11/2017     Uncomplicated asthma      Vitamin B12 deficiency (non anemic) 5/11/2017     Past Surgical History:   Procedure Laterality Date     ANESTHESIA CARDIOVERSION N/A 5/11/2020    Procedure: ANESTHESIA, FOR CARDIOVERSION @1100;  Surgeon: GENERIC ANESTHESIA PROVIDER;  Location: [facility] OR     CV RIGHT HEART CATH N/A 7/24/2019    Procedure: CV RIGHT HEART CATH;  Surgeon: Renu Sears MD;  Location:  HEART CARDIAC CATH LAB     CV RIGHT HEART CATH N/A 8/5/2020    Procedure: CV RIGHT HEART CATH;  Surgeon: Nicola Seth MD;  Location:   HEART CARDIAC CATH LAB     GI SURGERY      Sylvester en Y     INSERT VENTRICULAR ASSIST DEVICE LEFT (HEARTMATE II) N/A 2017    Procedure: INSERT VENTRICULAR ASSIST DEVICE LEFT (HEARTMATE II);  Median Sternotomy Heartmate II Left Ventricular Assist Device Insertion on Pump Oxygenator;  Surgeon: Rnonie Quigley MD;  Location: [facility] OR     ORTHOPEDIC SURGERY      right knee wired     Family History   Problem Relation Age of Onset     Cerebrovascular Disease Mother      Diabetes Mother      Hypertension Mother      Coronary Artery Disease Father      Diabetes Type 2  Father      Social History     Socioeconomic History     Marital status:      Spouse name: Not on file     Number of children: Not on file     Years of education: Not on file     Highest education level: Not on file   Occupational History     Not on file   Social Needs     Financial resource strain: Not on file     Food insecurity     Worry: Not on file     Inability: Not on file     Transportation needs     Medical: Not on file     Non-medical: Not on file   Tobacco Use     Smoking status: Former Smoker     Last attempt to quit:      Years since quittin.7     Smokeless tobacco: Never Used   Substance and Sexual Activity     Alcohol use: No     Drug use: No     Sexual activity: Yes     Partners: Female   Lifestyle     Physical activity     Days per week: Not on file     Minutes per session: Not on file     Stress: Not on file   Relationships     Social connections     Talks on phone: Not on file     Gets together: Not on file     Attends Christian service: Not on file     Active member of club or organization: Not on file     Attends meetings of clubs or organizations: Not on file     Relationship status: Not on file     Intimate partner violence     Fear of current or ex partner: Not on file     Emotionally abused: Not on file     Physically abused: Not on file     Forced sexual activity: Not on file   Other Topics Concern      Parent/sibling w/ CABG, MI or angioplasty before 65F 55M? No   Social History Narrative     Not on file     Patient Active Problem List   Diagnosis     Benign essential hypertension     Paroxysmal atrial fibrillation (H)     Diabetes mellitus (H)     CKD (chronic kidney disease) stage 3, GFR 30-59 ml/min (H)     Depression     H/O gastric bypass     CECILIA (obstructive sleep apnea)     Vitamin B12 deficiency (non anemic)     Paroxysmal VT (H)     ICD (implantable cardioverter-defibrillator), Medtronic Biventricular ICD - Not Dependent     NICM (nonischemic cardiomyopathy) (H)/ EF 20%     CHF (congestive heart failure) (H)     Heart failure (H)     LVAD (left ventricular assist device) present (H)     Long-term (current) use of anticoagulants [Z79.01]     Physical deconditioning     Chronic systolic congestive heart failure (H)     Iron deficiency anemia     Influenza     Dyspnea     Acute-on-chronic kidney injury (H)     Shortness of breath     WALTER (acute kidney injury) (H)     Bacteremia     Allergies   Allergen Reactions     Beer Shortness Of Breath     Grass Shortness Of Breath     Ace Inhibitors Cough     Dust Mites Other (See Comments)     Asthma     Mold Other (See Comments)     Asthma     Penicillins Other (See Comments)     Unknown - childhood exposure     Sulfa Drugs Other (See Comments) and Unknown     Unknown childhood reaction         ROS     A 10 point ROS was performed with pertinent findings listed above.      Objective     PHYSICAL EXAM  /79 (BP Location: Right arm)   Pulse 77   Temp 97.7  F (36.5  C) (Oral)   Resp 18   Wt 106.8 kg (235 lb 8 oz)   SpO2 96%   BMI 35.81 kg/m    Wt Readings from Last 4 Encounters:   09/21/20 106.8 kg (235 lb 8 oz)   09/17/20 102.1 kg (225 lb)   08/07/20 106.2 kg (234 lb 1.6 oz)   07/23/20 108.4 kg (239 lb)     Constitutional: WD-WN-WG cooperative  Eyes: nl EOM, PERRLA, vision, conjunctiva, sclera  ENT: nl external ears, nose, hearing, lips, teeth, gums,  throat  No mucous membrane lesions, normal saliva pool  Neck: no mass or thyroid enlargement  Resp: lungs clear to auscultation, nl to palpation  CV: RRR, no murmurs, rubs or gallops, no edema  : not tested  Lymph: no cervical, supraclavicular, inguinal or epitrochlear nodes  MS: Joints globally found to be normal. No active synovitis or deformity.  Skin: no nail pitting, alopecia, rash, nodules or lesions  Psych: nl judgement, orientation, memory, affect.    DATA:  Outside Records: YES  Outside Xrays: YES  CBC:  Recent Labs   Lab Test 09/21/20  0521 09/20/20  0448 09/19/20  0530   WBC 6.2 4.8 7.1   RBC 3.25* 3.25* 3.54*   HGB 8.6* 8.7* 9.5*   HCT 30.1* 30.0* 32.9*   MCV 93 92 93   MCH 26.5 26.8 26.8   MCHC 28.6* 29.0* 28.9*   RDW 17.1* 17.0* 16.4*    213 249       BMP:  Recent Labs   Lab Test 09/21/20  0521 09/20/20  1643 09/20/20  0448    137 137   POTASSIUM 3.3* 4.3 4.1   CHLORIDE 109 108 106   CO2 28 24 26   ANIONGAP 3 5 5   * 203* 188*   BUN 20 17 17   CR 1.52* 1.44* 1.42*   GFRESTIMATED 48* 51* 52*   QAMAR 7.8* 8.2* 8.0*       LFT:  Recent Labs   Lab Test 09/19/20  0530 09/18/20  1713 09/17/20  1554   PROTTOTAL 6.7* 6.6* 6.9   ALBUMIN 3.4 3.4 3.6   BILITOTAL 0.7 0.6 0.7   ALKPHOS 92 97 119   AST 29 30 37   ALT 45 49 59       No results found for: CKTOTAL  TSH   Date Value Ref Range Status   06/24/2020 1.30 0.40 - 4.00 mU/L Final     Lab Results   Component Value Date    URIC 9.0 01/21/2020    URIC 9.0 07/31/2019    URIC 6.8 07/03/2019       Inflammatory markers  Lab Results   Component Value Date    CRP 26.0 07/06/2018    CRP 9.4 04/13/2018    CRP 5.7 12/19/2017     Lab Results   Component Value Date    SED 18 04/13/2018    SED 25 05/26/2017    SED 21 05/25/2017     Ferritin   Date Value Ref Range Status   07/31/2019 41 26 - 388 ng/mL Final   07/03/2019 46 26 - 388 ng/mL Final   11/20/2018 71 26 - 388 ng/mL Final       UA RESULTS:  Recent Labs   Lab Test 09/10/20  0921 01/16/20  0503  02/12/19  0142 05/23/17  2100   COLOR Yellow Yellow Light Yellow Yellow   APPEARANCE Clear Clear Clear Clear   URINEGLC Negative 30* Negative Negative   URINEBILI Negative Negative Negative Negative   URINEKETONE Negative 40* Negative Negative   SG 1.010 1.012 1.006 1.011   UBLD Negative Negative Negative Negative   URINEPH 6.5 5.5 6.0 5.5   PROTEIN 10* 30* Negative Negative   NITRITE Negative Negative Negative Negative   LEUKEST Negative Negative Negative Negative   RBCU O - 2 0  --  <1   WBCU 0 - 5 <1  --  <1         Autoimmunity labs:     No results found for: RHF  No results found for: CCPIGG  No results found for: ANCA  No results found for: S9LVATD  No results found for: V8PIPFG  No results found for: BRITTANY  No results found for: DNA  No results found for: RNPIGG, SMIGG, SSAIGG, SSBIGG, SCLIGG    IMAGING:    Results for orders placed or performed during the hospital encounter of 09/18/20   XR Chest Port 1 View    Impression    Impression: Unchanged pulmonary edema. Underlying infection not  completely excluded. LVAD. Implantable cardiac defibrillator.  Borderline cardiomegaly. Aortic atherosclerosis.    I have personally reviewed the examination and initial interpretation  and I agree with the findings.    MD Kelsey KIMBALL MS4

## 2020-09-21 NOTE — CONSULTS
Chronic Pulmonary Disease Specialist Consult   2020    Patient: Jim Willingham      :  1955                    MRN:[id number]      Reason for Consult:  Medical team requesting for recommendations on steroids in patient with mild asthma    History of Present Illness: Presenting with 2-3 days of chills, myalgias, headaches. WBC 5.6. BCx  with GPC in clusters in 1/2 bottles. Initially sent home after BCx drawn on Levaquin and admitted now after BCx turned positive. Source unknown, although LVAD site appears clean, this is possible.    Most recent hospitalization and reason:     2020   Streptococcal sore throat; acute kidney injury      Smoking Hx:   Former smoker; quit      Pulmonologist/Last office visit  Followed primarily by Dr. Jose Neville at [facility]. Last seen on 3/2/2020.  Also was seen virtually on 2020 by [facility] Pulmonologist Dr. Chase Qureshi for pre heart transplant workup.    Most recent  PFT/interpretation on     9/15/2017                  Pre Bronchodilator            Post Bronchodilator  FVC             2.18 L     54%    FEV1             1.04 L     32%    FEV1/FVC   (Ratio)                    48%                                 DLCO                  76%    Interpretation  Very severe obstruction with normal DLCO.         Relevant Sleep Studies:  On auto cpap 12-18 cwp.     Home Oxygen Use:   No    Most recent Chest X-ray:  2020   Impression: Unchanged pulmonary edema. Underlying infection not completely excluded. LVAD. Implantable cardiac defibrillator. Borderline cardiomegaly. Aortic atherosclerosis.    Most recent CT:  2020    1.  No focal consolidation to suggest aspiration or pneumonia.  2.  Status post median sternotomy with LVAD device in situ.  3.  Atherosclerotic vascular disease and coronary artery disease.        Patient up-to-date on Annual Influenza/ Pneumococcal Vaccination:  He has had his Influenza vaccine this year and is up to date  with Pneumococcal vaccine.     Pulmonary Rehab:  none       Respiratory medications:  Albuterol MDI 2 puffs every 4 hours prn  Breo Ellipta 200-25 mcg   1 puff daily  Incruse Ellipta 62.5 mcg   1 puff daily      Assessment:  Jim is on RA with oxygen saturation 96%            Recommendations:  -Continue with current inpatient respiratory medication schedule.    -Inpatient pulmonary consult for further recommendations regarding future steroid use.     -Has been seen recently by [facility] Pulmonologist Dr. Chase Qureshi for heart transplant workup. Also strongly suggest consulting with his primary pulmonologist, Dr. Jose Neville at [facility] regarding steroid use.    -At discharge continue with patient's home regimen of respiratory medications      Will continue to be available to the medical team if have further questions.     I spent 30 minutes in chart review and consultation with medical team.    JOSEPH Rouse, RRT, CTTS  Chronic Pulmonary Disease Specialist  Office: [phone]   Pager: [phone]

## 2020-09-21 NOTE — PROGRESS NOTES
.        [facility], [place]    Cardiology Progress Note- Cards 2        Date of Admission:  9/18/2020     Assessment & Plan: S   Jim Willingham is a 65 year old male with PMHx significant for NICM (EF 20%) s/p HM II LVAD placement (6/19/17)as DT due to obesity, A-fib, RV failure, CKD 3, DM2 c/b neuropathy, CECILIA, HTN, Asthma who is admitted with bacteremia.      Changes today  -Continue vancomycin  -One time dose of Bumex 4 mg IV. Continue 2 mg BID  -BC until negative for 72 hours  -Increased allopurinol from 100 to 200 mg daily  -Decrease prednisone from 20 to 15 mg tomorrow. Taper to 10 mg on 9/29 and 5 mg on 10/6. Let Rheum about pt discharge for outpatient follow up    #. Staph hominis bacteremia  BCx 9/17 with GPC in clusters in 1/2 bottles. Presenting with 2-3 days of chills, myalgias, headaches. WBC 5.6. Initially sent home after BCx drawn on Levaquin. Source unknown, LVAD site appears clean, dressing changed last week.  -CT chest, abdomen and pelvis without contrast  - Antibiotics:             -ID following, appreciate reccs             -Levaquin 9/17 - 9/18             -Zosyn 9/18 - 9/20             -Continue Vancomycin 9/18- present         - BCx 9/17: 1/2 staph hominis            9/18: 1/2 gram positive cocci in clusters            9/19: NGTD            9/20: NGTD            9/21: NGTD  -ID following appreciate reccs     #.  Chronic systolic heart failure secondary to NICM  Stage D  NYHA Class IIIA  ACEi/ARB: Losartan stopped d/t fluctuating renal function  Afterload reduction:  hydral 30 mg TID  BB: Stopped during recent admission  Aldosterone antagonist: Stopped d/t hyperkalemia.  SCD prophylaxis: ICD  Fluid status: hypervolemic. Diuresis as above        #.  S/P LVAD implant as DT due to obesity  Goal is to loose weight to be eligible for transplant.  Anticoagulation: Warfarin with INR goal of 2-3, hold given INR>3  Antiplatelet: Aspirin 81 mg daily- okay to hold while  supratheraputic inr.  MAP: Goal 65-85   LDH: Stable, pending  VAD Interrogation today:  see chart       #. PAF  - continue amiodarone  - hold warfarin give INR>3     #. CKD 3  BL in the past year has been 1.2-1.4. Likely new BL 1.4-1.6 given recent WALTER     #. DM2  Last A1c 7.5 on 8/2020. On Lantus 6U qam and novolog.  - Lantus 3U qam, med ssi, hypoglycemic protocol  - RN to use Dexcom readings and no fingersticks, QID     #. DM neuropathy  #. Chronic pain  - Continue gabapentin and tramadol  - Prednisone 20 mg daily. Reportedly has been on it for many yrs, will decrease prednisone from 20 to 15 mg tomorrow. Taper to 10 mg on 9/29 and 5 mg on 10/6. Let Rheum about pt discharge for outpatient follow up       Chronic Problems  #. Depression: Continue Celexa.   #. GERD: Continue Protonix.   #. Gout: Continue Allopurinol.   #. Obesity s/p Gastric Bypass with Chronic anemia: on IV Iron infusions  #. Asthma: Continue home inhalers.   #. CECILIA: Continue home cpap     FEN: carb-consistent diet  Prophylaxis: warfarin, supratherapuetic INR, per pharmacy  Code Status: Full code  Disposition: Bacteremia work up      Disposition Plan   Expected discharge: 2 - 3 days, recommended to prior living arrangement and treatment for the bactermia      Entered: Kamran Hong MD 09/21/2020, 10:19 AM       The patient's care was discussed with the attending physician Dr. Nicola Hong MD  [facility], [place]  Pager: [phone]  Please see sticky note for cross cover information  ______________________________________________________________________    Interval History   No acute overnight events.    Remained afebrile since admission. Denies chills or myalgias. Reports feeling better with antibiotics for the past 2 days.    Discussed yesterday about the long term side effects of using prednisone 20 mg daily. Patient reported that he has been put on prednisone for 5+ for carpal tunnel syndrome and  neuropathy. It was prescribed by his PCP, Dr. Salas.    Data reviewed today: I reviewed all medications, new labs and imaging results over the last 24 hours.     Physical Exam   Vital Signs: Temp: 97.9  F (36.6  C) Temp src: Oral BP: (!) 88/64 Pulse: 78   Resp: 18 SpO2: 99 % O2 Device: None (Room air)    Weight: 235 lbs 8 oz    General: Sitting in bed, pleasant, NAD  Neck: JVD ~13 cm  CV: RRR,with LVAD hum, LVAD driveline site without any erythema, tenderness or drainage  Resp: Clear to auscultation bilaterally, no wheezes, rhonchi  Abd: Soft, non-tender, BS+, no masses appreciated  Extremities: warm and well perfused, palpable pulses, +1-2 pitting b/l LE edema below knees  Neuro: A&Ox3, No lateralizing symptoms or focal neurologic deficits    Data   Recent Labs   Lab 09/21/20  0521 09/20/20  1643 09/20/20  0646 09/20/20  0448 09/19/20  0530 09/18/20  1713   WBC 6.2  --   --  4.8 7.1 5.6   HGB 8.6*  --   --  8.7* 9.5* 9.3*   MCV 93  --   --  92 93 91     --   --  213 249 256   INR 3.41*  --  3.95*  --  4.09*  --     137  --  137 138 137   POTASSIUM 3.3* 4.3  --  4.1 3.4 4.2   CHLORIDE 109 108  --  106 106 106   CO2 28 24  --  26 25 26   BUN 20 17  --  17 21 22   CR 1.52* 1.44*  --  1.42* 1.56* 1.64*   ANIONGAP 3 5  --  5 8 6   QAMAR 7.8* 8.2*  --  8.0* 8.6 8.5   * 203*  --  188* 94 169*   ALBUMIN  --   --   --   --  3.4 3.4   PROTTOTAL  --   --   --   --  6.7* 6.6*   BILITOTAL  --   --   --   --  0.7 0.6   ALKPHOS  --   --   --   --  92 97   ALT  --   --   --   --  45 49   AST  --   --   --   --  29 30

## 2020-09-21 NOTE — PLAN OF CARE
"OT: session attempted however pt stated \"yeah sorry that's not going to happen today.\" reports not feeling well and \"having a lot going on.\" Will re-schedule.   "

## 2020-09-22 ENCOUNTER — APPOINTMENT (OUTPATIENT)
Dept: OCCUPATIONAL THERAPY | Facility: CLINIC | Age: 65
DRG: 871 | End: 2020-09-22
Payer: COMMERCIAL

## 2020-09-22 ENCOUNTER — APPOINTMENT (OUTPATIENT)
Dept: ULTRASOUND IMAGING | Facility: CLINIC | Age: 65
DRG: 871 | End: 2020-09-22
Payer: COMMERCIAL

## 2020-09-22 LAB
ANION GAP SERPL CALCULATED.3IONS-SCNC: 3 MMOL/L (ref 3–14)
ANION GAP SERPL CALCULATED.3IONS-SCNC: 4 MMOL/L (ref 3–14)
BACTERIA SPEC CULT: ABNORMAL
BACTERIA SPEC CULT: ABNORMAL
BUN SERPL-MCNC: 20 MG/DL (ref 7–30)
BUN SERPL-MCNC: 22 MG/DL (ref 7–30)
CALCIUM SERPL-MCNC: 8 MG/DL (ref 8.5–10.1)
CALCIUM SERPL-MCNC: 8.3 MG/DL (ref 8.5–10.1)
CHLORIDE SERPL-SCNC: 104 MMOL/L (ref 94–109)
CHLORIDE SERPL-SCNC: 111 MMOL/L (ref 94–109)
CO2 SERPL-SCNC: 28 MMOL/L (ref 20–32)
CO2 SERPL-SCNC: 28 MMOL/L (ref 20–32)
CREAT SERPL-MCNC: 1.43 MG/DL (ref 0.66–1.25)
CREAT SERPL-MCNC: 1.49 MG/DL (ref 0.66–1.25)
ERYTHROCYTE [DISTWIDTH] IN BLOOD BY AUTOMATED COUNT: 17.4 % (ref 10–15)
GFR SERPL CREATININE-BSD FRML MDRD: 49 ML/MIN/{1.73_M2}
GFR SERPL CREATININE-BSD FRML MDRD: 52 ML/MIN/{1.73_M2}
GLUCOSE SERPL-MCNC: 116 MG/DL (ref 70–99)
GLUCOSE SERPL-MCNC: 130 MG/DL (ref 70–99)
HCT VFR BLD AUTO: 32.3 % (ref 40–53)
HGB BLD-MCNC: 9.1 G/DL (ref 13.3–17.7)
INR PPP: 2.33 (ref 0.86–1.14)
LDH SERPL L TO P-CCNC: 323 U/L (ref 85–227)
MAGNESIUM SERPL-MCNC: 2.2 MG/DL (ref 1.6–2.3)
MAGNESIUM SERPL-MCNC: 2.3 MG/DL (ref 1.6–2.3)
MCH RBC QN AUTO: 26.6 PG (ref 26.5–33)
MCHC RBC AUTO-ENTMCNC: 28.2 G/DL (ref 31.5–36.5)
MCV RBC AUTO: 94 FL (ref 78–100)
PLATELET # BLD AUTO: 229 10E9/L (ref 150–450)
POTASSIUM SERPL-SCNC: 3.8 MMOL/L (ref 3.4–5.3)
POTASSIUM SERPL-SCNC: 4.6 MMOL/L (ref 3.4–5.3)
RBC # BLD AUTO: 3.42 10E12/L (ref 4.4–5.9)
SODIUM SERPL-SCNC: 135 MMOL/L (ref 133–144)
SODIUM SERPL-SCNC: 143 MMOL/L (ref 133–144)
SPECIMEN SOURCE: ABNORMAL
WBC # BLD AUTO: 4.6 10E9/L (ref 4–11)

## 2020-09-22 PROCEDURE — 85027 COMPLETE CBC AUTOMATED: CPT | Performed by: STUDENT IN AN ORGANIZED HEALTH CARE EDUCATION/TRAINING PROGRAM

## 2020-09-22 PROCEDURE — 80048 BASIC METABOLIC PNL TOTAL CA: CPT | Performed by: STUDENT IN AN ORGANIZED HEALTH CARE EDUCATION/TRAINING PROGRAM

## 2020-09-22 PROCEDURE — 25000132 ZZH RX MED GY IP 250 OP 250 PS 637: Performed by: STUDENT IN AN ORGANIZED HEALTH CARE EDUCATION/TRAINING PROGRAM

## 2020-09-22 PROCEDURE — 76700 US EXAM ABDOM COMPLETE: CPT

## 2020-09-22 PROCEDURE — 25000132 ZZH RX MED GY IP 250 OP 250 PS 637: Performed by: INTERNAL MEDICINE

## 2020-09-22 PROCEDURE — 99233 SBSQ HOSP IP/OBS HIGH 50: CPT | Mod: 25 | Performed by: INTERNAL MEDICINE

## 2020-09-22 PROCEDURE — 97110 THERAPEUTIC EXERCISES: CPT | Mod: GO

## 2020-09-22 PROCEDURE — 83615 LACTATE (LD) (LDH) ENZYME: CPT | Performed by: STUDENT IN AN ORGANIZED HEALTH CARE EDUCATION/TRAINING PROGRAM

## 2020-09-22 PROCEDURE — 25800030 ZZH RX IP 258 OP 636: Performed by: STUDENT IN AN ORGANIZED HEALTH CARE EDUCATION/TRAINING PROGRAM

## 2020-09-22 PROCEDURE — 25000128 H RX IP 250 OP 636: Performed by: STUDENT IN AN ORGANIZED HEALTH CARE EDUCATION/TRAINING PROGRAM

## 2020-09-22 PROCEDURE — 83735 ASSAY OF MAGNESIUM: CPT | Performed by: STUDENT IN AN ORGANIZED HEALTH CARE EDUCATION/TRAINING PROGRAM

## 2020-09-22 PROCEDURE — 85610 PROTHROMBIN TIME: CPT | Performed by: STUDENT IN AN ORGANIZED HEALTH CARE EDUCATION/TRAINING PROGRAM

## 2020-09-22 PROCEDURE — 36415 COLL VENOUS BLD VENIPUNCTURE: CPT | Performed by: STUDENT IN AN ORGANIZED HEALTH CARE EDUCATION/TRAINING PROGRAM

## 2020-09-22 PROCEDURE — 25800030 ZZH RX IP 258 OP 636: Performed by: INTERNAL MEDICINE

## 2020-09-22 PROCEDURE — 25000132 ZZH RX MED GY IP 250 OP 250 PS 637

## 2020-09-22 PROCEDURE — 21400000 ZZH R&B CCU UMMC

## 2020-09-22 PROCEDURE — 93750 INTERROGATION VAD IN PERSON: CPT | Performed by: INTERNAL MEDICINE

## 2020-09-22 PROCEDURE — 87040 BLOOD CULTURE FOR BACTERIA: CPT | Performed by: STUDENT IN AN ORGANIZED HEALTH CARE EDUCATION/TRAINING PROGRAM

## 2020-09-22 PROCEDURE — 25000131 ZZH RX MED GY IP 250 OP 636 PS 637: Performed by: STUDENT IN AN ORGANIZED HEALTH CARE EDUCATION/TRAINING PROGRAM

## 2020-09-22 PROCEDURE — 25000128 H RX IP 250 OP 636: Performed by: INTERNAL MEDICINE

## 2020-09-22 RX ORDER — LIDOCAINE 40 MG/G
CREAM TOPICAL
Status: ACTIVE | OUTPATIENT
Start: 2020-09-22 | End: 2020-09-25

## 2020-09-22 RX ORDER — WARFARIN SODIUM 5 MG/1
5 TABLET ORAL
Status: COMPLETED | OUTPATIENT
Start: 2020-09-22 | End: 2020-09-22

## 2020-09-22 RX ORDER — HEPARIN SODIUM,PORCINE 10 UNIT/ML
2-5 VIAL (ML) INTRAVENOUS
Status: ACTIVE | OUTPATIENT
Start: 2020-09-22 | End: 2020-09-25

## 2020-09-22 RX ORDER — METHYLPREDNISOLONE SODIUM SUCCINATE 125 MG/2ML
125 INJECTION, POWDER, LYOPHILIZED, FOR SOLUTION INTRAMUSCULAR; INTRAVENOUS
Status: DISCONTINUED | OUTPATIENT
Start: 2020-09-22 | End: 2020-09-23

## 2020-09-22 RX ORDER — DIPHENHYDRAMINE HYDROCHLORIDE 50 MG/ML
50 INJECTION INTRAMUSCULAR; INTRAVENOUS
Status: DISCONTINUED | OUTPATIENT
Start: 2020-09-22 | End: 2020-09-23

## 2020-09-22 RX ADMIN — BUMETANIDE 2 MG: 2 TABLET ORAL at 08:58

## 2020-09-22 RX ADMIN — AMIODARONE HYDROCHLORIDE 400 MG: 200 TABLET ORAL at 08:58

## 2020-09-22 RX ADMIN — INSULIN GLARGINE 3 UNITS: 100 INJECTION, SOLUTION SUBCUTANEOUS at 09:10

## 2020-09-22 RX ADMIN — POTASSIUM CHLORIDE 40 MEQ: 750 TABLET, EXTENDED RELEASE ORAL at 20:20

## 2020-09-22 RX ADMIN — INSULIN ASPART 1 UNITS: 100 INJECTION, SOLUTION INTRAVENOUS; SUBCUTANEOUS at 11:48

## 2020-09-22 RX ADMIN — ALLOPURINOL 200 MG: 100 TABLET ORAL at 08:58

## 2020-09-22 RX ADMIN — ACETAMINOPHEN 650 MG: 325 TABLET, FILM COATED ORAL at 04:57

## 2020-09-22 RX ADMIN — TRAMADOL HYDROCHLORIDE 50 MG: 50 TABLET, FILM COATED ORAL at 09:25

## 2020-09-22 RX ADMIN — HYDRALAZINE HYDROCHLORIDE 30 MG: 10 TABLET, FILM COATED ORAL at 13:46

## 2020-09-22 RX ADMIN — UMECLIDINIUM 1 PUFF: 62.5 AEROSOL, POWDER ORAL at 09:00

## 2020-09-22 RX ADMIN — MONTELUKAST 10 MG: 10 TABLET, FILM COATED ORAL at 22:31

## 2020-09-22 RX ADMIN — IRON SUCROSE 300 MG: 20 INJECTION, SOLUTION INTRAVENOUS at 22:31

## 2020-09-22 RX ADMIN — CITALOPRAM HYDROBROMIDE 30 MG: 20 TABLET ORAL at 22:31

## 2020-09-22 RX ADMIN — WARFARIN SODIUM 5 MG: 5 TABLET ORAL at 17:53

## 2020-09-22 RX ADMIN — ACETAMINOPHEN 650 MG: 325 TABLET, FILM COATED ORAL at 20:26

## 2020-09-22 RX ADMIN — ACETAMINOPHEN 650 MG: 325 TABLET, FILM COATED ORAL at 16:17

## 2020-09-22 RX ADMIN — GABAPENTIN 600 MG: 300 CAPSULE ORAL at 22:31

## 2020-09-22 RX ADMIN — HYDRALAZINE HYDROCHLORIDE 30 MG: 10 TABLET, FILM COATED ORAL at 08:59

## 2020-09-22 RX ADMIN — POTASSIUM CHLORIDE 40 MEQ: 750 TABLET, EXTENDED RELEASE ORAL at 08:58

## 2020-09-22 RX ADMIN — TRAMADOL HYDROCHLORIDE 50 MG: 50 TABLET, FILM COATED ORAL at 20:26

## 2020-09-22 RX ADMIN — INSULIN ASPART 1 UNITS: 100 INJECTION, SOLUTION INTRAVENOUS; SUBCUTANEOUS at 17:53

## 2020-09-22 RX ADMIN — PANTOPRAZOLE SODIUM 40 MG: 40 TABLET, DELAYED RELEASE ORAL at 08:59

## 2020-09-22 RX ADMIN — HYDRALAZINE HYDROCHLORIDE 30 MG: 10 TABLET, FILM COATED ORAL at 20:20

## 2020-09-22 RX ADMIN — ACETAMINOPHEN 650 MG: 325 TABLET, FILM COATED ORAL at 09:25

## 2020-09-22 RX ADMIN — FLUTICASONE FUROATE AND VILANTEROL TRIFENATATE 1 PUFF: 200; 25 POWDER RESPIRATORY (INHALATION) at 09:00

## 2020-09-22 RX ADMIN — BUMETANIDE 2 MG: 2 TABLET ORAL at 16:10

## 2020-09-22 RX ADMIN — VANCOMYCIN HYDROCHLORIDE 1750 MG: 10 INJECTION, POWDER, LYOPHILIZED, FOR SOLUTION INTRAVENOUS at 16:09

## 2020-09-22 RX ADMIN — PREDNISONE 15 MG: 5 TABLET ORAL at 08:58

## 2020-09-22 ASSESSMENT — ACTIVITIES OF DAILY LIVING (ADL)
ADLS_ACUITY_SCORE: 12

## 2020-09-22 ASSESSMENT — PAIN DESCRIPTION - DESCRIPTORS
DESCRIPTORS: ACHING
DESCRIPTORS: ACHING

## 2020-09-22 NOTE — PLAN OF CARE
D: Admitted for bacteremia    I: Monitored vitals and assessed pt status.   PRN: Tylenol and tramadol     A: A0x4. VSS. Afebrile. CPAP at night. Reports chronic pain. LVAD dressing CDI. Matthews changed. LVAD numbers slightly elevated- flow 4-7, power 5-7. Independent with cares. BG-137. NPO at midnight. Urinating adequately.     P: Abdominal ultrasound today. Continue to monitor Pt status and report changes to treatment team.

## 2020-09-22 NOTE — PHARMACY-VANCOMYCIN DOSING SERVICE
Pharmacy Vancomycin Note  Date of Service 2020  Patient's  1955   65 year old, male    Indication: Sepsis  Goal Trough Level: 15-20 mg/L  Day of Therapy: 2  Current Vancomycin regimen:  2000 mg IV q18h    Current estimated CrCl = Estimated Creatinine Clearance: 59 mL/min (A) (based on SCr of 1.52 mg/dL (H)).    Creatinine for last 3 days  2020:  5:30 AM Creatinine 1.56 mg/dL  2020:  4:48 AM Creatinine 1.42 mg/dL;  4:43 PM Creatinine 1.44 mg/dL  2020:  5:21 AM Creatinine 1.52 mg/dL    Recent Vancomycin Levels (past 3 days)  2020:  8:10 PM Vancomycin Level 22.1 mg/L(17 hr tr)     Vancomycin IV Administrations (past 72 hours)                   vancomycin (VANCOCIN) 2,000 mg in sodium chloride 0.9 % 500 mL intermittent infusion (mg) 2,000 mg New Bag 20 0258     2,000 mg New Bag 20 0944     2,000 mg New Bag 20 1643    vancomycin (VANCOCIN) 2,500 mg in sodium chloride 0.9 % 500 mL intermittent infusion (mg) 2,500 mg New Bag 20 2135                Nephrotoxins and other renal medications (From now, onward)    Start     Dose/Rate Route Frequency Ordered Stop    20 1330  bumetanide (BUMEX) tablet 2 mg      2 mg Oral 2 TIMES DAILY. 20 1325      20 1530  vancomycin (VANCOCIN) 2,000 mg in sodium chloride 0.9 % 500 mL intermittent infusion      2,000 mg  over 2 Hours Intravenous EVERY 18 HOURS 20 0453               Contrast Orders - past 72 hours (72h ago, onward)    None          Interpretation of levels and current regimen:  Trough level is slightly Supratherapeutic for a true trough of 18 hour    Has serum creatinine changed > 50% in last 72 hours: No    Urine output:  good urine output    Renal Function: Stable    Plan:  1.  Decrease Dose to 1750mg IV q 18h  2.  Pharmacy will check trough levels as appropriate in 1-3 Days.    3. Serum creatinine levels will be ordered daily for the first week of therapy and at least twice weekly for  subsequent weeks.      Rahat Flores RPH        .

## 2020-09-22 NOTE — PROGRESS NOTES
D: Admitted 9/18/2020 for bacteremia. PMHx NICM (EF 20%) s/p HMII LVAD placement (6/9/2017), obesity, A-fib, RV failure, CKD 3, DM2 c/b neuropathy, CECILIA, HTN, Asthma  ?  I: Monitored vitals and assessed pt status. Abdominal US done today, without significant findings. Pt asking if he can get IV iron as inpt - he is scheduled for an outpt dose tomorrow, cards 2 notified of patient request. Per ID note, ok to place PICC tomorrow assuming no new positive blood cultures. .     A: A0x4. VSS. NSR. Afebrile. Urinating adequately. Complaint of shoulder pain managed with PRN tramadol and tylenol.   ?  P: Continue to monitor Pt status and report changes to treatment team.

## 2020-09-22 NOTE — PHARMACY-ANTICOAGULATION SERVICE
Clinical Pharmacy - Warfarin Dosing Consult     Pharmacy has been consulted to manage this patient s warfarin therapy.  Indication: Atrial Fibrillation  Therapy Goal: INR 2-3  OP Anticoag Clinic: [facility] AC  Warfarin Prior to Admission: Yes  Warfarin PTA Regimen: not steady but was informed to take 5mg Fri, Sat, Sun, Mon and return for an INR recheck  Significant drug interactions: asa, amiodarone, prednisone  Recent documented change in oral intake/nutrition: Unknown    INR   Date Value Ref Range Status   09/22/2020 2.33 (H) 0.86 - 1.14 Final   09/21/2020 3.41 (H) 0.86 - 1.14 Final     - last dose of warfarin was 9/17 and patient came in supratherapeutic    Recommend warfarin 5 mg today to help offset 4 days of not receiving warfarin.  Pharmacy will monitor Jim Willingham daily and order warfarin doses to achieve specified goal.      Please contact pharmacy as soon as possible if the warfarin needs to be held for a procedure or if the warfarin goals change.      Rahat Flores, Pharm.D, BCPS

## 2020-09-22 NOTE — PROGRESS NOTES
This is a recent snapshot of the patient's [facility] medical record.  For current drug dose and complete information and questions, call [phone] or In Basket pool, [facility] ([id number])  CSN Number:  [id number]

## 2020-09-22 NOTE — PROGRESS NOTES
Post LVAD Patient Social Work Assessment     Patient Name: Jim Willingham  : 1955  Age: 65 year old  MRN: [id number]  Date of LVAD implant: 2016    Patient known to me from follow up in the LVAD program. Writer assessed pt as part of heart transplant evaluation, on 2020, via telephone.    Admitted on 2020 for bacteremia.  Seen today to update assessment.      Presenting Information   Living Situation: Pt lives with his spouse, adopted great granddaughter, Graham, and pt's sister  Functional Status: Pt independent w/ ADLs and ambulation. Is the primary caregiver to great granddaughter.   Cultural/Language/Spiritual Considerations: None     Support System  Primary Support Person Although , pt currently identifies his wife as primary support person  Other support:  Pt's sister-lives in [place]   Plan for support in immediate post-hospital period: Pt anticipates returning home to resume independent lving.     Health Care Directive  Decision Maker: Pt   Alternate Decision Maker: Per MEEK, pt's wife (they are not legally )  Health Care Directive: Provided Copy-Pt is wanting his sister to be primary decision maker    Mental Health/Coping:   History of Mental Health: Anxiety   History of Chemical Health: Yes : Pt with hx of heavy alcohol use but quite in . Pt quite when his wife went to CD treatment. Pt has never been to CD treatment. Pt does endorse drinking a Heineken 0.0 every once in awhile. Pt has hx of marijuana use but reports he had not used for several years prior the LVAD.   Current status: Pt reports he continues not to drink or use marijuana.   Coping: Coping appropriately. Pt reports he is seeing a counselor due to marital difficulties.   Services Needed/Recommended: None currently. Pt is motivated to continue with therapy.     Financial   Income: SSD, great granddaughters SSDI, adoption assistance   Impact of LVAD on income: minimal  Insurance and medication  coverage: Yes   Financial concerns: None   Resources needed: None     Discharge Plan   Patient and family discharge goal: Return Home   Barriers to discharge: None anticipated    Education provided by CLIFF: Social Work role inpatient setting, availability of support groups (LVAD and Heart Transplant), HCD    Assessment and recommendations and plan:      SW recently assessed pt, via telephone, as part of Heart Transplant evaluation. Met w/ pt and he reports he was not entirely forthcoming during our interview and discussed much marital stress that would have been difficult for him to talk about on the phone as wife was present during the call. Pt reports that he and his wife have been  for over a year. She resides in their basement w/ her sister and he resides on the main level with their adopted great granddaughter. He is the primary caregiver to their great granddaughter. Pt is expressing reservations with his wife being able to provide caregiver support should he proceed with heart transplant. There have been times pt has had to pay his wife to provide assistance for him and she is no longer providing assistance to pt with weekly dressing change. Advised pt that he should be considering alternative caregiver options and will need to keep writer updated.     We discussed how he feels about potentially moving ahead with transplant and pt currently reports he is uncertain. He is worried who would provide caregiver support for his great granddaughter. Pt will continue to ponder moving ahead with heart transplant.     As we move closer to transplant, writer will need to check-in with pt regarding caregiver support plan and feelings about moving forward with transplant. Writer provided pt with information on heart transplant virtual support group.     Pt anticipates returning home with IV abx.

## 2020-09-22 NOTE — PLAN OF CARE
OT 6C  Discharge Planner OT   Patient plan for discharge: Home  Current status: Independent sit<>Stand. Pt completed ~150ft x2 of functional mobility with no AD and SBA. Pt declining further therapy at this time due to lunch arriving.   Barriers to return to prior living situation: fatigue, deconditioning, acute medical needs  Recommendations for discharge: Home  Rationale for recommendations: Pt is primarily independent with ADL completion, however would benefit from continued skilled therapy to increase activity tolerance and exercise endurance       Entered by: Jolanta Chu 09/22/2020 11:29 AM

## 2020-09-22 NOTE — PROGRESS NOTES
D: Called patient for routine virtual VAD Coordinator rounding (r/t COVID-19). No VAD related questions or concerns at this time.  I: Discussed POC and provided support and listened to patient and care giver's thoughts and concerns. Pt asking if he can get IV iron as inpt - he is scheduled for an outpt dose tomorrow. Paged Cards 2 team with request.  P: Continue to follow patient and address any questions or concerns patient and or caregiver may have.

## 2020-09-22 NOTE — PROGRESS NOTES
.        [facility], [place]    Cardiology Progress Note- Cards 2        Date of Admission:  9/18/2020     Assessment & Plan: S   Jim Willingham is a 65 year old male with PMHx significant for NICM (EF 20%) s/p HM II LVAD placement (6/19/17) as DT due to obesity, A-fib, RV failure, CKD 3, DM2 c/b neuropathy, CECILIA, HTN, Asthma who is admitted with bacteremia.      Changes today  - Continue vancomycin  - Continue Bumex 2 mg BID  - stop daily BC  - PICC line insertion  - Allopurinol 200 mg daily  - Decrease prednisone from 20 to 15 mg today.   >Taper to 10 mg on 9/29 and 5 mg on 10/6.    > Let Rheum know about pt discharge for outpatient follow up w/ Dr. Pettit.  - IV Venofer infusion 300 mg       #Staph hominis bacteremia  BCx 9/17 with GPC in clusters in 1/2 bottles. Presenting with 2-3 days of chills, myalgias, headaches. WBC 5.6. Initially sent home after BCx drawn on Levaquin. Source unknown, LVAD site appears clean, dressing changed last week.  - CT chest, abdomen and pelvis without contrast  - Antibiotics:             -ID following, appreciate reccs   - Home antibiotic plan per ID             -Levaquin 9/17 - 9/18             -Zosyn 9/18 - 9/20             -Continue Vancomycin 9/18- present         - BCx  9/17: 1/2 staph hominis             9/18: 1/2 gram positive cocci in clusters             9/19: NGTD             9/20: NGTD             9/21: NGTD   9/22: NGTD       #.  Chronic systolic heart failure secondary to NICM  Stage D  NYHA Class IIIA  ACEi/ARB: Losartan stopped d/t fluctuating renal function  Afterload reduction:  hydral 30 mg TID  BB: Stopped during recent admission  Aldosterone antagonist: Stopped d/t hyperkalemia.  SCD prophylaxis: ICD  Fluid status: hypervolemic. Diuresis as above        #.  S/P LVAD implant as DT due to obesity  Goal is to lose weight to be eligible for transplant.  Anticoagulation: Warfarin with INR goal of 2-3, INR today was 2.33, to resume  warfarin per Pharmacy.  Antiplatelet: Aspirin 81 mg daily- okay to hold while supratheraputic INR.  MAP: Goal 65-85   LDH: Stable, pending  VAD Interrogation today:  see chart       #. PAF  - continue amiodarone  - Warfarin per pharmacy dosing.     #. CKD 3  BL in the past year has been 1.2-1.4. Likely new BL 1.4-1.6 given recent WALTER     #. DM2  Last A1c 7.5 on 8/2020. On Lantus 6U qam and novolog.  - Lantus 3U qam, med ssi, hypoglycemic protocol  - RN to use Dexcom readings and no fingersticks, QID     #. DM neuropathy  #. Chronic pain  - Continue gabapentin and tramadol  - Prednisone 15 mg daily. Reportedly has been on it for many yrs, will decrease prednisone from 20 to 15 mg today. Taper to 10 mg on 9/29 and 5 mg on 10/6. Let Rheum know  about pt discharge for outpatient follow up       Chronic Problems  #. Depression: Continue Celexa.   #. GERD: Continue Protonix.   #. Gout: Continue Allopurinol.   #. Obesity s/p Gastric Bypass with Chronic anemia: on IV Iron infusions  #. Asthma: Continue home inhalers.   #. CECILIA: Continue home cpap     FEN: carb-consistent diet  Prophylaxis: warfarin, supratherapuetic INR, per pharmacy  Code Status: Full code  Disposition: Bacteremia work up      Disposition Plan   Expected discharge: 2 - 3 days, recommended to prior living arrangement and treatment for the bactermia      Entered: Jeffrey Cabrales MD 09/22/2020, 6:37 AM       The patient's care was discussed with the attending physician Dr. Nicola Cabrales MD  [facility], [place]  Pager: [phone]  Please see sticky note for cross cover information  ______________________________________________________________________    Interval History   No acute overnight events. No complaints today. Notes pedal swelling.    Data reviewed today: I reviewed all medications, new labs and imaging results over the last 24 hours.     Physical Exam   Vital Signs: Temp: 97.7  F (36.5  C) Temp src: Oral BP:  104/88 Pulse: 69   Resp: 18 SpO2: 95 % O2 Device: None (Room air)    Weight: 233 lbs 3.2 oz    General: Sitting out of bed, pleasant, NAD  CV: RRR,with LVAD hum, LVAD driveline site without any erythema, tenderness or drainage  Resp: Clear to auscultation bilaterally, no wheezes, rhonchi  Extremities: warm and well perfused, radial pulses difficult to palpate, +1 pitting b/l LE edema below knees  Neuro: A&Ox3, No lateralizing signs or focal neurologic deficits    Data   Recent Labs   Lab 09/22/20 0442 09/21/20 2010 09/21/20  1245 09/21/20  0521  09/20/20  0646 09/20/20  0448 09/19/20  0530 09/18/20  1713   WBC 4.6  --   --  6.2  --   --  4.8 7.1 5.6   HGB 9.1*  --   --  8.6*  --   --  8.7* 9.5* 9.3*   MCV 94  --   --  93  --   --  92 93 91     --   --  206  --   --  213 249 256   INR 2.33*  --   --  3.41*  --  3.95*  --  4.09*  --     139  --  140   < >  --  137 138 137   POTASSIUM 3.8 4.2 4.3 3.3*   < >  --  4.1 3.4 4.2   CHLORIDE 111* 108  --  109   < >  --  106 106 106   CO2 28 30  --  28   < >  --  26 25 26   BUN 20 22  --  20   < >  --  17 21 22   CR 1.49* 1.56*  --  1.52*   < >  --  1.42* 1.56* 1.64*   ANIONGAP 4 2*  --  3   < >  --  5 8 6   QAMAR 8.0* 8.6  --  7.8*   < >  --  8.0* 8.6 8.5   * 97  --  117*   < >  --  188* 94 169*   ALBUMIN  --   --   --   --   --   --   --  3.4 3.4   PROTTOTAL  --   --   --   --   --   --   --  6.7* 6.6*   BILITOTAL  --   --   --   --   --   --   --  0.7 0.6   ALKPHOS  --   --   --   --   --   --   --  92 97   ALT  --   --   --   --   --   --   --  45 49   AST  --   --   --   --   --   --   --  29 30    < > = values in this interval not displayed.

## 2020-09-23 ENCOUNTER — APPOINTMENT (OUTPATIENT)
Dept: GENERAL RADIOLOGY | Facility: CLINIC | Age: 65
DRG: 871 | End: 2020-09-23
Attending: STUDENT IN AN ORGANIZED HEALTH CARE EDUCATION/TRAINING PROGRAM
Payer: COMMERCIAL

## 2020-09-23 LAB
ANION GAP SERPL CALCULATED.3IONS-SCNC: 4 MMOL/L (ref 3–14)
ANION GAP SERPL CALCULATED.3IONS-SCNC: 6 MMOL/L (ref 3–14)
BACTERIA SPEC CULT: NO GROWTH
BUN SERPL-MCNC: 20 MG/DL (ref 7–30)
BUN SERPL-MCNC: 21 MG/DL (ref 7–30)
CALCIUM SERPL-MCNC: 8.2 MG/DL (ref 8.5–10.1)
CALCIUM SERPL-MCNC: 8.3 MG/DL (ref 8.5–10.1)
CHLORIDE SERPL-SCNC: 103 MMOL/L (ref 94–109)
CHLORIDE SERPL-SCNC: 106 MMOL/L (ref 94–109)
CO2 SERPL-SCNC: 28 MMOL/L (ref 20–32)
CO2 SERPL-SCNC: 28 MMOL/L (ref 20–32)
CREAT SERPL-MCNC: 1.28 MG/DL (ref 0.66–1.25)
CREAT SERPL-MCNC: 1.33 MG/DL (ref 0.66–1.25)
ERYTHROCYTE [DISTWIDTH] IN BLOOD BY AUTOMATED COUNT: 17.2 % (ref 10–15)
ERYTHROCYTE [DISTWIDTH] IN BLOOD BY AUTOMATED COUNT: 17.2 % (ref 10–15)
GFR SERPL CREATININE-BSD FRML MDRD: 56 ML/MIN/{1.73_M2}
GFR SERPL CREATININE-BSD FRML MDRD: 59 ML/MIN/{1.73_M2}
GLUCOSE SERPL-MCNC: 120 MG/DL (ref 70–99)
GLUCOSE SERPL-MCNC: 208 MG/DL (ref 70–99)
HCT VFR BLD AUTO: 30.6 % (ref 40–53)
HCT VFR BLD AUTO: 30.9 % (ref 40–53)
HGB BLD-MCNC: 8.7 G/DL (ref 13.3–17.7)
HGB BLD-MCNC: 8.8 G/DL (ref 13.3–17.7)
INR PPP: 1.76 (ref 0.86–1.14)
LDH SERPL L TO P-CCNC: 304 U/L (ref 85–227)
LMWH PPP CHRO-ACNC: 0.29 IU/ML
MAGNESIUM SERPL-MCNC: 2.2 MG/DL (ref 1.6–2.3)
MAGNESIUM SERPL-MCNC: 2.4 MG/DL (ref 1.6–2.3)
MCH RBC QN AUTO: 26.4 PG (ref 26.5–33)
MCH RBC QN AUTO: 26.5 PG (ref 26.5–33)
MCHC RBC AUTO-ENTMCNC: 28.4 G/DL (ref 31.5–36.5)
MCHC RBC AUTO-ENTMCNC: 28.5 G/DL (ref 31.5–36.5)
MCV RBC AUTO: 93 FL (ref 78–100)
MCV RBC AUTO: 93 FL (ref 78–100)
PLATELET # BLD AUTO: 205 10E9/L (ref 150–450)
PLATELET # BLD AUTO: 208 10E9/L (ref 150–450)
POTASSIUM SERPL-SCNC: 3.9 MMOL/L (ref 3.4–5.3)
POTASSIUM SERPL-SCNC: 4.4 MMOL/L (ref 3.4–5.3)
RBC # BLD AUTO: 3.29 10E12/L (ref 4.4–5.9)
RBC # BLD AUTO: 3.32 10E12/L (ref 4.4–5.9)
SODIUM SERPL-SCNC: 136 MMOL/L (ref 133–144)
SODIUM SERPL-SCNC: 138 MMOL/L (ref 133–144)
SPECIMEN SOURCE: NORMAL
WBC # BLD AUTO: 5 10E9/L (ref 4–11)
WBC # BLD AUTO: 5.6 10E9/L (ref 4–11)

## 2020-09-23 PROCEDURE — 25000132 ZZH RX MED GY IP 250 OP 250 PS 637: Performed by: INTERNAL MEDICINE

## 2020-09-23 PROCEDURE — 25000132 ZZH RX MED GY IP 250 OP 250 PS 637: Performed by: STUDENT IN AN ORGANIZED HEALTH CARE EDUCATION/TRAINING PROGRAM

## 2020-09-23 PROCEDURE — 85610 PROTHROMBIN TIME: CPT | Performed by: STUDENT IN AN ORGANIZED HEALTH CARE EDUCATION/TRAINING PROGRAM

## 2020-09-23 PROCEDURE — 83735 ASSAY OF MAGNESIUM: CPT | Performed by: STUDENT IN AN ORGANIZED HEALTH CARE EDUCATION/TRAINING PROGRAM

## 2020-09-23 PROCEDURE — 99232 SBSQ HOSP IP/OBS MODERATE 35: CPT | Mod: 25 | Performed by: INTERNAL MEDICINE

## 2020-09-23 PROCEDURE — 25000128 H RX IP 250 OP 636: Performed by: STUDENT IN AN ORGANIZED HEALTH CARE EDUCATION/TRAINING PROGRAM

## 2020-09-23 PROCEDURE — 85520 HEPARIN ASSAY: CPT | Performed by: INTERNAL MEDICINE

## 2020-09-23 PROCEDURE — 25000132 ZZH RX MED GY IP 250 OP 250 PS 637

## 2020-09-23 PROCEDURE — 80048 BASIC METABOLIC PNL TOTAL CA: CPT | Performed by: STUDENT IN AN ORGANIZED HEALTH CARE EDUCATION/TRAINING PROGRAM

## 2020-09-23 PROCEDURE — 25800030 ZZH RX IP 258 OP 636: Performed by: INTERNAL MEDICINE

## 2020-09-23 PROCEDURE — 93750 INTERROGATION VAD IN PERSON: CPT | Performed by: INTERNAL MEDICINE

## 2020-09-23 PROCEDURE — 36592 COLLECT BLOOD FROM PICC: CPT | Performed by: STUDENT IN AN ORGANIZED HEALTH CARE EDUCATION/TRAINING PROGRAM

## 2020-09-23 PROCEDURE — 36569 INSJ PICC 5 YR+ W/O IMAGING: CPT

## 2020-09-23 PROCEDURE — 36415 COLL VENOUS BLD VENIPUNCTURE: CPT | Performed by: STUDENT IN AN ORGANIZED HEALTH CARE EDUCATION/TRAINING PROGRAM

## 2020-09-23 PROCEDURE — 25000125 ZZHC RX 250: Performed by: STUDENT IN AN ORGANIZED HEALTH CARE EDUCATION/TRAINING PROGRAM

## 2020-09-23 PROCEDURE — 25000128 H RX IP 250 OP 636: Performed by: INTERNAL MEDICINE

## 2020-09-23 PROCEDURE — 85027 COMPLETE CBC AUTOMATED: CPT | Performed by: STUDENT IN AN ORGANIZED HEALTH CARE EDUCATION/TRAINING PROGRAM

## 2020-09-23 PROCEDURE — 40000986 XR CHEST PORT 1 VW

## 2020-09-23 PROCEDURE — 25000131 ZZH RX MED GY IP 250 OP 636 PS 637: Performed by: STUDENT IN AN ORGANIZED HEALTH CARE EDUCATION/TRAINING PROGRAM

## 2020-09-23 PROCEDURE — 36415 COLL VENOUS BLD VENIPUNCTURE: CPT | Performed by: INTERNAL MEDICINE

## 2020-09-23 PROCEDURE — 27211389 ZZ H KIT, 5 FR DL BIOFLO OPEN ENDED PICC

## 2020-09-23 PROCEDURE — 21400000 ZZH R&B CCU UMMC

## 2020-09-23 PROCEDURE — 27211417 ZZ H KIT, VPS RHYTHM STYLET

## 2020-09-23 PROCEDURE — 83615 LACTATE (LD) (LDH) ENZYME: CPT | Performed by: STUDENT IN AN ORGANIZED HEALTH CARE EDUCATION/TRAINING PROGRAM

## 2020-09-23 PROCEDURE — 87040 BLOOD CULTURE FOR BACTERIA: CPT | Performed by: STUDENT IN AN ORGANIZED HEALTH CARE EDUCATION/TRAINING PROGRAM

## 2020-09-23 RX ORDER — HEPARIN SODIUM,PORCINE 10 UNIT/ML
5-10 VIAL (ML) INTRAVENOUS EVERY 24 HOURS
Status: DISCONTINUED | OUTPATIENT
Start: 2020-09-23 | End: 2020-09-27 | Stop reason: HOSPADM

## 2020-09-23 RX ORDER — SPIRONOLACTONE 25 MG/1
25 TABLET ORAL DAILY
Status: DISCONTINUED | OUTPATIENT
Start: 2020-09-23 | End: 2020-09-27 | Stop reason: HOSPADM

## 2020-09-23 RX ORDER — DIPHENHYDRAMINE HYDROCHLORIDE 50 MG/ML
50 INJECTION INTRAMUSCULAR; INTRAVENOUS
Status: DISCONTINUED | OUTPATIENT
Start: 2020-09-24 | End: 2020-09-24

## 2020-09-23 RX ORDER — HEPARIN SODIUM 10000 [USP'U]/100ML
0-3500 INJECTION, SOLUTION INTRAVENOUS CONTINUOUS
Status: DISCONTINUED | OUTPATIENT
Start: 2020-09-23 | End: 2020-09-27 | Stop reason: HOSPADM

## 2020-09-23 RX ORDER — BUMETANIDE 0.25 MG/ML
3 INJECTION INTRAMUSCULAR; INTRAVENOUS ONCE
Status: COMPLETED | OUTPATIENT
Start: 2020-09-23 | End: 2020-09-23

## 2020-09-23 RX ORDER — METHYLPREDNISOLONE SODIUM SUCCINATE 125 MG/2ML
125 INJECTION, POWDER, LYOPHILIZED, FOR SOLUTION INTRAMUSCULAR; INTRAVENOUS
Status: DISCONTINUED | OUTPATIENT
Start: 2020-09-24 | End: 2020-09-24

## 2020-09-23 RX ORDER — WARFARIN SODIUM 5 MG/1
5 TABLET ORAL
Status: COMPLETED | OUTPATIENT
Start: 2020-09-23 | End: 2020-09-23

## 2020-09-23 RX ORDER — HEPARIN SODIUM,PORCINE 10 UNIT/ML
5-10 VIAL (ML) INTRAVENOUS
Status: DISCONTINUED | OUTPATIENT
Start: 2020-09-23 | End: 2020-09-27 | Stop reason: HOSPADM

## 2020-09-23 RX ORDER — LANOLIN ALCOHOL/MO/W.PET/CERES
3 CREAM (GRAM) TOPICAL
Status: DISCONTINUED | OUTPATIENT
Start: 2020-09-23 | End: 2020-09-26

## 2020-09-23 RX ADMIN — Medication 5 ML: at 16:55

## 2020-09-23 RX ADMIN — ACETAMINOPHEN 650 MG: 325 TABLET, FILM COATED ORAL at 06:34

## 2020-09-23 RX ADMIN — UMECLIDINIUM 1 PUFF: 62.5 AEROSOL, POWDER ORAL at 08:24

## 2020-09-23 RX ADMIN — MONTELUKAST 10 MG: 10 TABLET, FILM COATED ORAL at 21:36

## 2020-09-23 RX ADMIN — Medication 5 ML: at 21:36

## 2020-09-23 RX ADMIN — BUMETANIDE 3 MG: 0.25 INJECTION INTRAMUSCULAR; INTRAVENOUS at 14:34

## 2020-09-23 RX ADMIN — GABAPENTIN 600 MG: 300 CAPSULE ORAL at 21:36

## 2020-09-23 RX ADMIN — POTASSIUM CHLORIDE 40 MEQ: 750 TABLET, EXTENDED RELEASE ORAL at 19:45

## 2020-09-23 RX ADMIN — FLUTICASONE FUROATE AND VILANTEROL TRIFENATATE 1 PUFF: 200; 25 POWDER RESPIRATORY (INHALATION) at 08:23

## 2020-09-23 RX ADMIN — INSULIN GLARGINE 3 UNITS: 100 INJECTION, SOLUTION SUBCUTANEOUS at 08:29

## 2020-09-23 RX ADMIN — ACETAMINOPHEN 650 MG: 325 TABLET, FILM COATED ORAL at 14:57

## 2020-09-23 RX ADMIN — AMIODARONE HYDROCHLORIDE 400 MG: 200 TABLET ORAL at 08:24

## 2020-09-23 RX ADMIN — POTASSIUM CHLORIDE 40 MEQ: 750 TABLET, EXTENDED RELEASE ORAL at 08:24

## 2020-09-23 RX ADMIN — BUMETANIDE 2 MG: 2 TABLET ORAL at 08:24

## 2020-09-23 RX ADMIN — VANCOMYCIN HYDROCHLORIDE 1750 MG: 10 INJECTION, POWDER, LYOPHILIZED, FOR SOLUTION INTRAVENOUS at 11:05

## 2020-09-23 RX ADMIN — LIDOCAINE HYDROCHLORIDE 4 ML: 10 INJECTION, SOLUTION EPIDURAL; INFILTRATION; INTRACAUDAL; PERINEURAL at 10:32

## 2020-09-23 RX ADMIN — BUMETANIDE 2 MG: 2 TABLET ORAL at 18:41

## 2020-09-23 RX ADMIN — Medication 3 MG: at 22:05

## 2020-09-23 RX ADMIN — INSULIN ASPART 2 UNITS: 100 INJECTION, SOLUTION INTRAVENOUS; SUBCUTANEOUS at 12:21

## 2020-09-23 RX ADMIN — TRAMADOL HYDROCHLORIDE 50 MG: 50 TABLET, FILM COATED ORAL at 06:34

## 2020-09-23 RX ADMIN — SPIRONOLACTONE 25 MG: 25 TABLET ORAL at 14:34

## 2020-09-23 RX ADMIN — HYDRALAZINE HYDROCHLORIDE 30 MG: 10 TABLET, FILM COATED ORAL at 19:45

## 2020-09-23 RX ADMIN — PANTOPRAZOLE SODIUM 40 MG: 40 TABLET, DELAYED RELEASE ORAL at 08:25

## 2020-09-23 RX ADMIN — INSULIN ASPART 3 UNITS: 100 INJECTION, SOLUTION INTRAVENOUS; SUBCUTANEOUS at 18:16

## 2020-09-23 RX ADMIN — HEPARIN SODIUM 1200 UNITS/HR: 10000 INJECTION, SOLUTION INTRAVENOUS at 14:55

## 2020-09-23 RX ADMIN — HYDRALAZINE HYDROCHLORIDE 30 MG: 10 TABLET, FILM COATED ORAL at 14:34

## 2020-09-23 RX ADMIN — PREDNISONE 15 MG: 5 TABLET ORAL at 08:24

## 2020-09-23 RX ADMIN — CITALOPRAM HYDROBROMIDE 30 MG: 20 TABLET ORAL at 21:36

## 2020-09-23 RX ADMIN — WARFARIN SODIUM 5 MG: 5 TABLET ORAL at 18:41

## 2020-09-23 RX ADMIN — ALLOPURINOL 200 MG: 100 TABLET ORAL at 08:24

## 2020-09-23 RX ADMIN — HYDRALAZINE HYDROCHLORIDE 30 MG: 10 TABLET, FILM COATED ORAL at 08:24

## 2020-09-23 ASSESSMENT — ACTIVITIES OF DAILY LIVING (ADL)
ADLS_ACUITY_SCORE: 12

## 2020-09-23 ASSESSMENT — PAIN DESCRIPTION - DESCRIPTORS: DESCRIPTORS: ACHING

## 2020-09-23 NOTE — PROCEDURES
[facility], [place]    Double Lumen PICC Placement    Date/Time: 9/23/2020 10:27 AM  Performed by: Dayanna Huffman RN  Authorized by: Hai Toribio MD   Indications: Antibiotics.    UNIVERSAL PROTOCOL   Site Marked: Yes  Prior Images Obtained and Reviewed:  Yes  Required items: Required blood products, implants, devices and special equipment available    Patient identity confirmed:  Verbally with patient and arm band  NA - No sedation, light sedation, or local anesthesia  Confirmation Checklist:  Patient's identity using two indicators, relevant allergies, procedure was appropriate and matched the consent or emergent situation and correct equipment/implants were available  Time out: Immediately prior to the procedure a time out was called    Universal Protocol: the Joint Commission Universal Protocol was followed    Preparation: Patient was prepped and draped in usual sterile fashion           ANESTHESIA    Anesthesia: See MAR for details  Local Anesthetic:  Lidocaine 1% without epinephrine  Anesthetic Total (mL):  4      SEDATION    Patient Sedated: No        Preparation: skin prepped with ChloraPrep  Skin prep agent: skin prep agent completely dried prior to procedure  Sterile barriers: maximum sterile barriers were used: cap, mask, sterile gown, sterile gloves, and large sterile sheet  Hand hygiene: hand hygiene performed prior to central venous catheter insertion  Type of line used: Power PICC  Catheter type: double lumen  Lumen type: non-valved  Catheter size: 5 Fr  Brand: Bard  Lot number: [id number]  Placement method: venipuncture, MST, ultrasound and tip confirmation system  Number of attempts: 1  Successful placement: yes  Orientation: right  Location: basilic vein (vein diameter- 0.96cm)  Site rationale: left AICD  Arm circumference: adults 10 cm  Extremity circumference: 35  Visible catheter length: 1  Total catheter length: 43  Dressing and securement: blood cleaned with CHG,  chlorhexidine disc applied, site cleaned, statlock and sterile dressing applied  Post procedure assessment: blood return through all ports and placement verified by x-ray  PROCEDURE   Patient Tolerance:  Patient tolerated the procedure well with no immediate complications

## 2020-09-23 NOTE — PROGRESS NOTES
Care Coordinator - Discharge Planning    Admission Date/Time:  9/18/2020  Attending MD:  Nicola Seth MD     Data  Date of initial CC assessment: 9/21/2020  Chart reviewed, discussed with interdisciplinary team.   Patient with history of LVAD admitted for:   1. Bacteremia    2. Positive blood culture      Assessment   Full assessment completed in previous note    Coordination of Care and Referrals: Provided patient/family with options for home infusion. This writer received update from [facility] liason that pt has coverage for home IV abx and has decided to use [facility]. Pt had PICC placed this morning and is scheduled for PLC tomorrow, 9/24 at noon.     Intervention  Orders placed for [facility] (Ph: [phone]) for home IV antibiotics and PICC line cares/supplies.     Plan  Anticipated Discharge Date: 9/24/2020  Anticipated Discharge Plan: Discharge to home with home infusion  CC will continue to monitor patient's medical condition and progress towards discharge.  Rin Ceballos RN BSN  6C Unit Care Coordinator  Phone number: [phone]  Pager: [phone]

## 2020-09-23 NOTE — PROGRESS NOTES
D: Admitted 9/18/2020 for bacteremia. PMHx NICM (EF 20%) s/p HMII LVAD placement (6/9/2017), obesity, A-fib, RV failure, CKD 3, DM2 c/b neuropathy, CECILIA, HTN, Asthma  ?  I: Monitored vitals and assessed pt status. PICC line placed today. Started on IV heparin at 1200 units/hr  ?  A: A0x4. VSS. NSR. Afebrile. Urine output has been good, did receive 3 mg IV bumex and started on spirolactone.   ?  P: Continue to monitor Pt status and report changes to treatment team.

## 2020-09-23 NOTE — PLAN OF CARE
D: Pt admitted for bacteremia. PMHx NICM s/p HM2 (2017), as DT d/t obesity, Afib, RV failure, CKD, DM2, CECILIA, HTN, asthma.     I/A: AVSS, on RA. CPAP overnight. SR, 60s-70s. LVAD numbers wnl, no alarms overnight - dressing cdi. Tylenol and tramadol given for pain. AOx4. 2g Na diet. BG checks ACHS w/ dexacom scanner. +BS, last BM 9/22. Voiding good amounts via urinal. PIV SL. Iron IV given - next dose due Thursday. Up independently. Slept between cares.     P: Plan is for PICC placement today and discharge home on IV antibiotics when medically ready. Continue to monitor and follow POC. Notify team of any changes.

## 2020-09-23 NOTE — PLAN OF CARE
OT 6C. Cancel. Pt declining therapy upon AM and PM attempts due to not feeling well. Will reschedule per POC.

## 2020-09-23 NOTE — PROGRESS NOTES
.        [facility], [place]    Cardiology Progress Note- Cards 2        Date of Admission:  9/18/2020     Assessment & Plan: HVSL   Jim Willingham is a 65 year old male with PMHx significant for NICM (EF 20%) s/p HM II LVAD placement (6/19/17) as DT due to obesity, A-fib, RV failure, CKD 3, DM2 c/b neuropathy, CECILIA, HTN, Asthma who is admitted with bacteremia.      Changes today  - Continue vancomycin, 9/18-present.  - IV Bumex 3 mg once, given.  - >Continue Bumex 2 mg BID  - PICC line inserted  - Allopurinol 200 mg daily  - Decrease prednisone 15 mg daily,   > Taper to 10 mg on 9/29 and 5 mg on 10/6.    > Let Rheum know about pt discharge for outpatient follow up w/ Dr. Pettit.  - Start spironolactone 25 mg daily  - Start IV heparin drip    #Staph hominis bacteremia  BCx 9/17 with GPC in clusters in 1/2 bottles. Presented with 2-3 days of chills, myalgias, headaches. WBC 5.6. Initially sent home after BCx drawn on Levaquin. Source unknown, LVAD site appears clean, dressing changed last week.  - CT chest, abdomen and pelvis without contrast done (09/21/2020), showed no identifiable fluid collection suggestive of driveline infection.  - Antibiotics:             -Levaquin 9/17 - 9/18             -Zosyn 9/18 - 9/20   -vancomycin, 9/18-present             - ID following, appreciate reccs    > Continue vancomycin, goal trough 15-20.    > Continue vancomycin until follow up with Dr. Sinclair in the ID clinic on 10/22 at 2pm (scheduled).     > Check CBC and CMP and have results fax[place]. Sinclair [place] [facility] [place].   - BCx  9/17: 1/2 staph hominis             9/18: 1/2 gram positive cocci in clusters             9/19: NGTD             9/20: NGTD             9/21: NGTD   9/22: NGTD       #.  Chronic systolic heart failure secondary to NICM  Stage D  NYHA Class IIIA  ACEi/ARB: Losartan stopped d/t fluctuating renal function  Afterload reduction:  hydral 30 mg TID  BB: Stopped  during past admission  Aldosterone antagonist: Spironolactone 25 mg daily  SCD prophylaxis: ICD  Fluid status: hypervolemic. Continue Bumex 2 mg BID, IV Bumex 3 mg once, today.        #.  S/P LVAD implant as DT due to obesity  Goal is to lose weight to be eligible for transplant.  Anticoagulation: Warfarin with INR goal of 2-3, INR today was 1.76, warfarin per Pharmacy. Started IV heparin drip today.  Antiplatelet: Aspirin 81 mg daily.  MAP: Goal 65-85   LDH: Stable, 304 unit(s)/L (09/23/2020)  VAD Interrogation today:  see chart       #. PAF  - continue amiodarone  - Warfarin per pharmacy dosing.     #. CKD 3  BL in the past year has been 1.2-1.4. Likely new BL 1.4-1.6 given recent WALTER.  -Scr today was 1.33 mg/dl.     #. DM2  Last A1c 7.5 on 8/2020. On Lantus 6U qam and novolog.  - Lantus 3U qam, med ssi, hypoglycemic protocol  - RN to use Dexcom readings and no fingersticks, QID     #. DM neuropathy  #. Chronic pain  - Continue gabapentin and tramadol  - Prednisone 15 mg daily. Reportedly has been on it for many yrs.    >Taper to 10 mg on 9/29 and 5 mg on 10/6.   >Let Rheum know  about pt discharge for outpatient follow up       Chronic Problems  #. Depression: Continue Celexa.   #. GERD: Continue Protonix.   #. Gout: Continue Allopurinol.   #. Obesity s/p Gastric Bypass with Chronic anemia: on IV Iron infusions  #. Asthma: Continue home inhalers.   #. CECILIA: Continue home cpap     FEN: carb-consistent diet  Prophylaxis: warfarin, per pharmacy. IV heparin.   Code Status: Full code  Disposition: Bacteremia work up      Disposition Plan   Expected discharge: 2 - 3 days, recommended to prior living arrangement and treatment for the bactermia      Entered: Jeffrey Cabrales MD 09/23/2020, 6:36 AM       The patient's care was discussed with the attending physician Dr. Nicola Cabrales MD  [facility], [place]  Pager: [phone]  Please see sticky note for cross cover  information  ______________________________________________________________________    Interval History   No acute overnight events. No complaints today. Notes pedal swelling.    Data reviewed today: I reviewed all medications, new labs and imaging results over the last 24 hours.     Physical Exam   Vital Signs: Temp: 98.6  F (37  C) Temp src: Oral BP: 100/83 Pulse: 72   Resp: 16 SpO2: 96 % O2 Device: None (Room air)    Weight: 233 lbs 0 oz    General: Sitting up in bed, pleasant, NAD  CV: RRR,with LVAD hum, LVAD driveline site without any erythema, tenderness or drainage  Resp: Clear to auscultation bilaterally, no wheezes, rhonchi  Extremities: warm and well perfused, radial pulses difficult to palpate, +1 pitting b/l LE edema below knees  Neuro: A&Ox3, No lateralizing signs or focal neurologic deficits    Data   Recent Labs   Lab 09/23/20  1428 09/23/20  0518 09/22/20  1710 09/22/20  0442  09/21/20  0521  09/19/20  0530 09/18/20  1713   WBC 5.6 5.0  --  4.6  --  6.2   < > 7.1 5.6   HGB 8.8* 8.7*  --  9.1*  --  8.6*   < > 9.5* 9.3*   MCV 93 93  --  94  --  93   < > 93 91    205  --  229  --  206   < > 249 256   INR  --  1.76*  --  2.33*  --  3.41*   < > 4.09*  --    NA  --  138 135 143   < > 140   < > 138 137   POTASSIUM  --  3.9 4.6 3.8   < > 3.3*   < > 3.4 4.2   CHLORIDE  --  106 104 111*   < > 109   < > 106 106   CO2  --  28 28 28   < > 28   < > 25 26   BUN  --  21 22 20   < > 20   < > 21 22   CR  --  1.33* 1.43* 1.49*   < > 1.52*   < > 1.56* 1.64*   ANIONGAP  --  4 3 4   < > 3   < > 8 6   QAMAR  --  8.2* 8.3* 8.0*   < > 7.8*   < > 8.6 8.5   GLC  --  120* 130* 116*   < > 117*   < > 94 169*   ALBUMIN  --   --   --   --   --   --   --  3.4 3.4   PROTTOTAL  --   --   --   --   --   --   --  6.7* 6.6*   BILITOTAL  --   --   --   --   --   --   --  0.7 0.6   ALKPHOS  --   --   --   --   --   --   --  92 97   ALT  --   --   --   --   --   --   --  45 49   AST  --   --   --   --   --   --   --  29 30    < > =  values in this interval not displayed.

## 2020-09-24 ENCOUNTER — DOCUMENTATION ONLY (OUTPATIENT)
Dept: TRANSPLANT | Facility: CLINIC | Age: 65
End: 2020-09-24

## 2020-09-24 ENCOUNTER — APPOINTMENT (OUTPATIENT)
Dept: EDUCATION SERVICES | Facility: CLINIC | Age: 65
End: 2020-09-24

## 2020-09-24 LAB
ANION GAP SERPL CALCULATED.3IONS-SCNC: 5 MMOL/L (ref 3–14)
ANION GAP SERPL CALCULATED.3IONS-SCNC: 5 MMOL/L (ref 3–14)
BACTERIA SPEC CULT: NO GROWTH
BUN SERPL-MCNC: 19 MG/DL (ref 7–30)
BUN SERPL-MCNC: 19 MG/DL (ref 7–30)
CALCIUM SERPL-MCNC: 8.2 MG/DL (ref 8.5–10.1)
CALCIUM SERPL-MCNC: 8.4 MG/DL (ref 8.5–10.1)
CHLORIDE SERPL-SCNC: 104 MMOL/L (ref 94–109)
CHLORIDE SERPL-SCNC: 105 MMOL/L (ref 94–109)
CO2 SERPL-SCNC: 26 MMOL/L (ref 20–32)
CO2 SERPL-SCNC: 28 MMOL/L (ref 20–32)
CREAT SERPL-MCNC: 1.24 MG/DL (ref 0.66–1.25)
CREAT SERPL-MCNC: 1.42 MG/DL (ref 0.66–1.25)
ERYTHROCYTE [DISTWIDTH] IN BLOOD BY AUTOMATED COUNT: 17.3 % (ref 10–15)
GFR SERPL CREATININE-BSD FRML MDRD: 52 ML/MIN/{1.73_M2}
GFR SERPL CREATININE-BSD FRML MDRD: 61 ML/MIN/{1.73_M2}
GLUCOSE SERPL-MCNC: 220 MG/DL (ref 70–99)
GLUCOSE SERPL-MCNC: 81 MG/DL (ref 70–99)
HCT VFR BLD AUTO: 30.4 % (ref 40–53)
HGB BLD-MCNC: 8.8 G/DL (ref 13.3–17.7)
INR PPP: 1.63 (ref 0.86–1.14)
LDH SERPL L TO P-CCNC: 281 U/L (ref 85–227)
LMWH PPP CHRO-ACNC: 0.23 IU/ML
LMWH PPP CHRO-ACNC: 0.37 IU/ML
LMWH PPP CHRO-ACNC: 0.42 IU/ML
LMWH PPP CHRO-ACNC: 1.59 IU/ML
MAGNESIUM SERPL-MCNC: 2.2 MG/DL (ref 1.6–2.3)
MAGNESIUM SERPL-MCNC: 2.3 MG/DL (ref 1.6–2.3)
MCH RBC QN AUTO: 26.7 PG (ref 26.5–33)
MCHC RBC AUTO-ENTMCNC: 28.9 G/DL (ref 31.5–36.5)
MCV RBC AUTO: 92 FL (ref 78–100)
PLATELET # BLD AUTO: 187 10E9/L (ref 150–450)
POTASSIUM SERPL-SCNC: 3.8 MMOL/L (ref 3.4–5.3)
POTASSIUM SERPL-SCNC: 4.9 MMOL/L (ref 3.4–5.3)
RBC # BLD AUTO: 3.3 10E12/L (ref 4.4–5.9)
SODIUM SERPL-SCNC: 135 MMOL/L (ref 133–144)
SODIUM SERPL-SCNC: 138 MMOL/L (ref 133–144)
SPECIMEN SOURCE: NORMAL
WBC # BLD AUTO: 5.2 10E9/L (ref 4–11)

## 2020-09-24 PROCEDURE — 85610 PROTHROMBIN TIME: CPT | Performed by: STUDENT IN AN ORGANIZED HEALTH CARE EDUCATION/TRAINING PROGRAM

## 2020-09-24 PROCEDURE — 83735 ASSAY OF MAGNESIUM: CPT | Performed by: STUDENT IN AN ORGANIZED HEALTH CARE EDUCATION/TRAINING PROGRAM

## 2020-09-24 PROCEDURE — 25000132 ZZH RX MED GY IP 250 OP 250 PS 637: Performed by: STUDENT IN AN ORGANIZED HEALTH CARE EDUCATION/TRAINING PROGRAM

## 2020-09-24 PROCEDURE — 25000128 H RX IP 250 OP 636: Performed by: INTERNAL MEDICINE

## 2020-09-24 PROCEDURE — 85520 HEPARIN ASSAY: CPT | Performed by: STUDENT IN AN ORGANIZED HEALTH CARE EDUCATION/TRAINING PROGRAM

## 2020-09-24 PROCEDURE — 21400000 ZZH R&B CCU UMMC

## 2020-09-24 PROCEDURE — 25000128 H RX IP 250 OP 636: Performed by: STUDENT IN AN ORGANIZED HEALTH CARE EDUCATION/TRAINING PROGRAM

## 2020-09-24 PROCEDURE — 36415 COLL VENOUS BLD VENIPUNCTURE: CPT | Performed by: STUDENT IN AN ORGANIZED HEALTH CARE EDUCATION/TRAINING PROGRAM

## 2020-09-24 PROCEDURE — 85520 HEPARIN ASSAY: CPT | Performed by: INTERNAL MEDICINE

## 2020-09-24 PROCEDURE — 25000132 ZZH RX MED GY IP 250 OP 250 PS 637

## 2020-09-24 PROCEDURE — 36592 COLLECT BLOOD FROM PICC: CPT | Performed by: STUDENT IN AN ORGANIZED HEALTH CARE EDUCATION/TRAINING PROGRAM

## 2020-09-24 PROCEDURE — 25000131 ZZH RX MED GY IP 250 OP 636 PS 637: Performed by: STUDENT IN AN ORGANIZED HEALTH CARE EDUCATION/TRAINING PROGRAM

## 2020-09-24 PROCEDURE — 85027 COMPLETE CBC AUTOMATED: CPT | Performed by: STUDENT IN AN ORGANIZED HEALTH CARE EDUCATION/TRAINING PROGRAM

## 2020-09-24 PROCEDURE — 25800030 ZZH RX IP 258 OP 636: Performed by: INTERNAL MEDICINE

## 2020-09-24 PROCEDURE — 93750 INTERROGATION VAD IN PERSON: CPT | Performed by: INTERNAL MEDICINE

## 2020-09-24 PROCEDURE — 99232 SBSQ HOSP IP/OBS MODERATE 35: CPT | Mod: 25 | Performed by: INTERNAL MEDICINE

## 2020-09-24 PROCEDURE — 36415 COLL VENOUS BLD VENIPUNCTURE: CPT | Performed by: INTERNAL MEDICINE

## 2020-09-24 PROCEDURE — 83615 LACTATE (LD) (LDH) ENZYME: CPT | Performed by: STUDENT IN AN ORGANIZED HEALTH CARE EDUCATION/TRAINING PROGRAM

## 2020-09-24 PROCEDURE — 80048 BASIC METABOLIC PNL TOTAL CA: CPT | Performed by: STUDENT IN AN ORGANIZED HEALTH CARE EDUCATION/TRAINING PROGRAM

## 2020-09-24 PROCEDURE — 25800030 ZZH RX IP 258 OP 636: Performed by: STUDENT IN AN ORGANIZED HEALTH CARE EDUCATION/TRAINING PROGRAM

## 2020-09-24 PROCEDURE — 25000132 ZZH RX MED GY IP 250 OP 250 PS 637: Performed by: INTERNAL MEDICINE

## 2020-09-24 RX ORDER — WARFARIN SODIUM 7.5 MG/1
7.5 TABLET ORAL
Status: COMPLETED | OUTPATIENT
Start: 2020-09-24 | End: 2020-09-24

## 2020-09-24 RX ADMIN — SPIRONOLACTONE 25 MG: 25 TABLET ORAL at 09:10

## 2020-09-24 RX ADMIN — ASPIRIN 81 MG CHEWABLE TABLET 81 MG: 81 TABLET CHEWABLE at 16:04

## 2020-09-24 RX ADMIN — GABAPENTIN 600 MG: 300 CAPSULE ORAL at 21:45

## 2020-09-24 RX ADMIN — BUMETANIDE 2 MG: 2 TABLET ORAL at 09:10

## 2020-09-24 RX ADMIN — UMECLIDINIUM 1 PUFF: 62.5 AEROSOL, POWDER ORAL at 09:11

## 2020-09-24 RX ADMIN — INSULIN ASPART 1 UNITS: 100 INJECTION, SOLUTION INTRAVENOUS; SUBCUTANEOUS at 13:50

## 2020-09-24 RX ADMIN — Medication 3 MG: at 21:48

## 2020-09-24 RX ADMIN — HEPARIN SODIUM 1050 UNITS/HR: 10000 INJECTION, SOLUTION INTRAVENOUS at 11:19

## 2020-09-24 RX ADMIN — CITALOPRAM HYDROBROMIDE 30 MG: 20 TABLET ORAL at 21:44

## 2020-09-24 RX ADMIN — MONTELUKAST 10 MG: 10 TABLET, FILM COATED ORAL at 21:45

## 2020-09-24 RX ADMIN — INSULIN GLARGINE 3 UNITS: 100 INJECTION, SOLUTION SUBCUTANEOUS at 09:14

## 2020-09-24 RX ADMIN — FLUTICASONE FUROATE AND VILANTEROL TRIFENATATE 1 PUFF: 200; 25 POWDER RESPIRATORY (INHALATION) at 09:11

## 2020-09-24 RX ADMIN — POTASSIUM CHLORIDE 20 MEQ: 750 TABLET, EXTENDED RELEASE ORAL at 13:52

## 2020-09-24 RX ADMIN — BUMETANIDE 2 MG: 2 TABLET ORAL at 16:05

## 2020-09-24 RX ADMIN — Medication 10 ML: at 13:49

## 2020-09-24 RX ADMIN — WARFARIN SODIUM 7.5 MG: 7.5 TABLET ORAL at 17:14

## 2020-09-24 RX ADMIN — POTASSIUM CHLORIDE 40 MEQ: 750 TABLET, EXTENDED RELEASE ORAL at 09:11

## 2020-09-24 RX ADMIN — HYDRALAZINE HYDROCHLORIDE 30 MG: 10 TABLET, FILM COATED ORAL at 09:11

## 2020-09-24 RX ADMIN — INSULIN ASPART 3 UNITS: 100 INJECTION, SOLUTION INTRAVENOUS; SUBCUTANEOUS at 17:12

## 2020-09-24 RX ADMIN — PANTOPRAZOLE SODIUM 40 MG: 40 TABLET, DELAYED RELEASE ORAL at 09:09

## 2020-09-24 RX ADMIN — HYDRALAZINE HYDROCHLORIDE 30 MG: 10 TABLET, FILM COATED ORAL at 19:36

## 2020-09-24 RX ADMIN — PREDNISONE 15 MG: 5 TABLET ORAL at 09:10

## 2020-09-24 RX ADMIN — TRAMADOL HYDROCHLORIDE 50 MG: 50 TABLET, FILM COATED ORAL at 09:29

## 2020-09-24 RX ADMIN — VANCOMYCIN HYDROCHLORIDE 1750 MG: 10 INJECTION, POWDER, LYOPHILIZED, FOR SOLUTION INTRAVENOUS at 03:26

## 2020-09-24 RX ADMIN — ALLOPURINOL 200 MG: 100 TABLET ORAL at 09:10

## 2020-09-24 RX ADMIN — ACETAMINOPHEN 650 MG: 325 TABLET, FILM COATED ORAL at 09:29

## 2020-09-24 RX ADMIN — HYDRALAZINE HYDROCHLORIDE 30 MG: 10 TABLET, FILM COATED ORAL at 13:49

## 2020-09-24 RX ADMIN — AMIODARONE HYDROCHLORIDE 400 MG: 200 TABLET ORAL at 09:10

## 2020-09-24 RX ADMIN — IRON SUCROSE 300 MG: 20 INJECTION, SOLUTION INTRAVENOUS at 09:12

## 2020-09-24 ASSESSMENT — PAIN DESCRIPTION - DESCRIPTORS
DESCRIPTORS: ACHING
DESCRIPTORS: SORE
DESCRIPTORS: SORE

## 2020-09-24 ASSESSMENT — ACTIVITIES OF DAILY LIVING (ADL)
ADLS_ACUITY_SCORE: 12

## 2020-09-24 NOTE — PROGRESS NOTES
ID CHART NOTE:    I received a call from our ID pharmacist today to discuss outpt antibiotic plan. Mr. Willingham is currently receiving vancomycin q18 hours, which is a difficult schedule to keep as an outpatient. His CoNS isolate was sensitive to oxacillin (somewhat unusual for CoNS), so we could potentially use a continuous infusion of cefazolin to treat this. There is a theoretical concern for heteroresistant populations of CoNS, some of which may be R to oxacillin, but I still think cefazolin would be okay if this makes home dosing more feasible.     Please just update me prior to discharge regarding final abx choice. Regardless of vanco vs cefazolin, duration and follow up with be the same.     Margaret Sinclair MD  [phone]

## 2020-09-24 NOTE — PROGRESS NOTES
.        [facility], [place]    Cardiology Progress Note- Cards 2        Date of Admission:  9/18/2020     Assessment & Plan: HVSL   Jim Willingham is a 65 year old male with PMHx significant for NICM (EF 20%) s/p HM II LVAD placement (6/19/17) as DT due to obesity, A-fib, RV failure, CKD 3, DM2 c/b neuropathy, CECILIA, HTN, and Asthma who was admitted with bacteremia.      Changes today  - IV Venofer 300 mg today.  - For possible discharge tomorrow if INR is within therapeutic range.    #Staph hominis bacteremia  BCx 9/17 with GPC in clusters in 1/2 bottles. Presented with 2-3 days of chills, myalgias, headaches. WBC 5.6. Initially sent home after BCx drawn on Levaquin. Source unknown, LVAD site appears clean, dressing changed last week.  - CT chest, abdomen, and pelvis without contrast done (09/21/2020), showed no identifiable fluid collection suggestive of driveline infection.  - Antibiotics:             -Levaquin 9/17 - 9/18             -Zosyn 9/18 - 9/20   -vancomycin, 9/18-present             - ID following, appreciate reccs    > Continue vancomycin, goal trough 15-20.    > Continue vancomycin until follow up with Dr. Sinclair in the ID clinic on 10/22 at 2pm (scheduled).     > Check CBC and CMP and have results faxed to Dr. Sinclair in the [facility] ID clinic.   - BCx  9/17: 1/2 staph hominis             9/18: 1/2 gram positive cocci in clusters             9/19: NGTD             9/20: NGTD             9/21: NGTD   9/22: NGTD       #.  Chronic systolic heart failure secondary to NICM  Stage D  NYHA Class IIIA  ACEi/ARB: Losartan stopped d/t fluctuating renal function  Afterload reduction:  hydral 30 mg TID  BB: Stopped during past admission  Aldosterone antagonist: Spironolactone 25 mg daily  SCD prophylaxis: ICD  Fluid status: hypervolemic. Continue Bumex 2 mg BID.      #.  S/P LVAD implant as DT due to obesity  Goal is to lose weight to be eligible for  transplant.  Anticoagulation: Warfarin with INR goal of 2-3,INR today was 1.63, warfarin per Pharmacy. continue IV heparin drip.  Antiplatelet: Aspirin 81 mg daily.  MAP: Goal 65-85   LDH: Stable, 304 unit(s)/L (09/23/2020)  VAD Interrogation today:  see chart       #. PAF  - continue amiodarone  - Warfarin per pharmacy dosing.     #. CKD 3  BL in the past year has been 1.2-1.4. Likely new BL 1.4-1.6 given recent WALTER.  -Scr today was 1.24 mg/dl.     #. DM2  Last A1c 7.5 on 8/2020. On Lantus 6U qam and novolog.  - Lantus 3U qam, med ssi, hypoglycemic protocol  - RN to use Dexcom readings and no fingersticks, QID     #. DM neuropathy  #. Chronic pain  - Continue gabapentin and tramadol  - Prednisone 15 mg daily. Reportedly has been on it for many yrs.    >Taper to 10 mg on 9/29 and 5 mg on 10/6.   >Let Rheum know  about pt discharge for outpatient follow up.       Chronic Problems  #. Depression: Continue Celexa.   #. GERD: Continue Protonix.   #. Gout: Continue Allopurinol.   #. Obesity s/p Gastric Bypass with Chronic anemia: on IV Iron infusions  #. Asthma: Continue home inhalers.   #. CECILIA: Continue home cpap     FEN: carb-consistent diet  Prophylaxis: warfarin, per pharmacy. IV heparin.   Code Status: Full code  Disposition: Bacteremia work up, therapeutic INR.      Disposition Plan   Expected discharge: 2 - 3 days, recommended to prior living arrangement and treatment for the bactermia      Entered: Jeffrey Cabrales MD 09/24/2020, 3:21 PM       The patient's care was discussed with the attending physician Dr. Nicola Seth.    Jeffrey Cabrales MD  [facility], [place]  Pager: [phone]  Please see sticky note for cross cover information  ______________________________________________________________________    Interval History   No acute overnight events.  No complaints today.     Data reviewed today: I reviewed all medications, new labs and imaging results over the last 24 hours.      Physical Exam   Vital Signs: Temp: 97.8  F (36.6  C) Temp src: Oral BP: 96/56 Pulse: 93   Resp: 16 SpO2: 98 % O2 Device: BiPAP/CPAP    Weight: 230 lbs 6.4 oz    General: Sitting up in bed, pleasant, NAD  CV: RRR,with LVAD hum, LVAD driveline site without any erythema, tenderness or drainage  Resp: Clear to auscultation bilaterally, no wheezes, rhonchi  Extremities: warm and well perfused, radial pulses difficult to palpate, +1 pitting b/l LE edema below knees  Neuro: A&Ox3, No lateralizing signs or focal neurologic deficits    Data   Recent Labs   Lab 09/24/20  0543 09/23/20  1702 09/23/20  1428 09/23/20  0518  09/22/20  0442  09/19/20  0530 09/18/20  1713   WBC 5.2  --  5.6 5.0  --  4.6   < > 7.1 5.6   HGB 8.8*  --  8.8* 8.7*  --  9.1*   < > 9.5* 9.3*   MCV 92  --  93 93  --  94   < > 93 91     --  208 205  --  229   < > 249 256   INR 1.63*  --   --  1.76*  --  2.33*   < > 4.09*  --     136  --  138   < > 143   < > 138 137   POTASSIUM 3.8 4.4  --  3.9   < > 3.8   < > 3.4 4.2   CHLORIDE 105 103  --  106   < > 111*   < > 106 106   CO2 28 28  --  28   < > 28   < > 25 26   BUN 19 20  --  21   < > 20   < > 21 22   CR 1.24 1.28*  --  1.33*   < > 1.49*   < > 1.56* 1.64*   ANIONGAP 5 6  --  4   < > 4   < > 8 6   QAMAR 8.2* 8.3*  --  8.2*   < > 8.0*   < > 8.6 8.5   GLC 81 208*  --  120*   < > 116*   < > 94 169*   ALBUMIN  --   --   --   --   --   --   --  3.4 3.4   PROTTOTAL  --   --   --   --   --   --   --  6.7* 6.6*   BILITOTAL  --   --   --   --   --   --   --  0.7 0.6   ALKPHOS  --   --   --   --   --   --   --  92 97   ALT  --   --   --   --   --   --   --  45 49   AST  --   --   --   --   --   --   --  29 30    < > = values in this interval not displayed.

## 2020-09-24 NOTE — PROGRESS NOTES
Transplant Teaching (Virtual Nurse Coordinator Visit) 9-14-20    Writer met with patient and S.O.,  to complete heart transplant teaching. We reviewed the candidate selection process, insurance prior authorization, [employer]OS priority statuses, the waiting period, donor issues (inclduing PHS risk), and the pre-, jason-, and post-op periods. In addition, we discussed some of the side effects of prednisone including elevated blood sugars, muscle weakness, sun sensitivity, and mood changes. We also reviewed the major risks of transplantation including rejection, infection and transplant vasculopathy. We also discussed patient and caregiver responsibilities before and after transplant including the need for patient to identify a caregiver to be present for education and to assist patient post discharge. Throughout the 90-minute session, the patient and S.O. were attentive, eager to learn, and asked appropriate questions.  Patient  was given a copy of the [employer]OS brochure on Multiple Waitlisting, the  Heart Transplant brochure including current program statistics, and a copy of the transplant handbook for review.  Gave them the transplant office toll-free number and encouraged to call with future questions or concerns.    In follow up, the patient was instructed on completing the following items for his/her transplant evaluation:    Complete transplant evaluation, continue with weightloss.

## 2020-09-24 NOTE — PHARMACY-VANCOMYCIN DOSING SERVICE
Pharmacy Empiric Dose Change Per Policy    Original Dose Ordered: 1750 mg IV q18h  Dose Changed To: 2000 mg IV q24h    This dose change was based on the need for home infusion once daily dosing.    Estimated Creatinine Clearance: 71.4 mL/min (based on SCr of 1.24 mg/dL).     Will continue to follow and modify dosage according to levels, organ function and clinical condition    Nettie Darby, [place]  CVICU and Advanced Heart Failure Pharmacist  Pager [phone]

## 2020-09-24 NOTE — PHARMACY
[facility], [facility]  Parenteral ANtibiotic Review at Departure from Acute Care Collaborative Note     Antimicrobial Stewardship Program - A joint venture between [facility] and  Physicians to optimize antibiotic management.  NOT a formal consult - Restricted Antimicrobial Review     Patient: Jim Willingham  MRN: [id number]  Allergies: Beer; Grass; Ace inhibitors; Dust mites; Mold; Penicillins; and Sulfa drugs    Brief Summary: Jim Willingham is a 65 year old male with PMHx significant for NICM (EF 20%) s/p HM II LVAD (6/19/17) as destination therapy due to obesity, Afib, CKD III, and T2DM who developed CoNS bacteremia. The source of bacteremia remains unclear at this point with imaging without an infectious foci. Discussed alternative regimens (I.e., nafcillin and cefazolin) with Dr. Sinclair with CoNS being oxacillin sensitive. Patient tolerated Zosyn during this hospitalization, lessening the risk for reaction with nafcillin in the setting of penicillin allergy (unknown reaction - reportedly occurred during childhood). However, nafcillin has a significant delayed reaction with warfarin where it increases warfarin metabolism, leading to subtherapeutic INRs and may persist for up to 30 days after nafcillin cessation. Cefazolin is another option, either continuous or intermittent dosing, but concerned for suboptimal dosing given patient's body habitus (inadequate time > RENETTA with intermittent dosing) with possibility increasing the risk of selecting for heteroresistant CoNS isolates and lack of firm stability data at continuous infusion concentrations. 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 He plans to complete an extended vancomycin course as an outpatient.    Antimicrobial Dose/Route/Frequency  Duration/Indication Start Date End Date   Vancomycin IV 2000 mg/IV/every 24 hours TBD/bacteremia(CoNS) 9/18/2020 TBD per ID provider (Earliest 10/22/2020)     Laboratory Tests: CBC, CMP   Lab Monitoring Frequency: 1x week   Therapeutic Drug Monitoring: Vancomycin Trough (serum)(goal trough ~15, pharmacy to dose)   Therapeutic Drug Monitoring Frequency: 1x week(May increase vancomycin trough monitoring with regimen changes, labile renal function, and/or addition of nephrotoxic medications.)   Miscellaneous Drug Monitoring: none       Line Type: PICC(Double lumen, placed 9/23/2020)    Reassess Line/Pull Line Date: TBD per ID provider    First Dose Received in Controlled Setting: Yes    Designated Provider: Margaret Sinclair MD    Follow-up: Patient does  have ID follow-up. Appointment date/time: 10/22/2020 @ 2:00 PM.    Recommendations/Additional Information:   1.) Please fax laboratory results to [facility] ([phone]), attn: Dr. Sinclair  2.) Patient was on an every 18 hour vancomycin regimen while inpatient. He was empirically changed to an every 24 hour regimen (reflected above) set to start 9/25. Recommend rechecking serum vancomycin trough level next Monday or Tuesday (prior to his 4th or 5th dose, respectively).     9/28 Addendum: patient's vancomycin dose was decreased to 1750 mg every 24 hours while inpatient due to supratherapeutic level. He was discharged on this regimen with plans to reassess level on Wednesday (9/30).     Angelina Gould, PharmD, BCIDP  Pager: [phone]    Vital Signs/Clinical Features:  Vitals         09/22 0700  -  09/23 0659 09/23 0700  -  09/24 0659 09/24 0700  -  09/24 1145   Most Recent    Temp ( F) 97.8 -  98.8    97.5 -  98.6      97.8     97.8 (36.6)    Pulse 62 -  77    63 -  75      93     93    Resp 15 -  18      16      16     16    BP 75/52 -  107/96    88/64 -  100/83      96/56     96/56    SpO2 (%) 95 -  100    95 -  98      98     98             Labs  Estimated Creatinine Clearance: 71.4 mL/min (based on SCr of 1.24 mg/dL).  Recent Labs   Lab Test 09/21/20 2010 09/22/20  0442 09/22/20  1710 09/23/20  0518 09/23/20  1702 09/24/20  0543   CR 1.56* 1.49* 1.43* 1.33* 1.28* 1.24       Recent Labs   Lab Test 08/05/20  0335  08/17/20  1025 08/24/20  0900  09/17/20  1554 09/18/20  1713 09/19/20  0530 09/20/20 0448 09/21/20  0521 09/22/20 0442 09/23/20  0518 09/23/20  1428 09/24/20  0543   WBC 8.0   < > 7.6 5.8   < > 6.3 5.6 7.1 4.8 6.2 4.6 5.0 5.6 5.2   ANEU 6.8  --  5.3 3.3  --  5.6 4.8 4.5  --   --   --   --   --   --    ALYM 0.8  --  1.5 1.4  --  0.3* 0.5* 1.5  --   --   --   --   --   --    VIJAY 0.4  --  0.6 0.9  --  0.3 0.3 0.9  --   --   --   --   --   --    AEOS 0.0  --  0.0 0.2  --  0.0 0.0 0.1  --   --   --   --   --   --    HGB 13.1*   < > 10.4* 10.3*   < > 9.7* 9.3* 9.5* 8.7* 8.6* 9.1* 8.7* 8.8* 8.8*   HCT 39.9*   < > 34.2* 33.7*   < > 33.6* 32.0* 32.9* 30.0* 30.1* 32.3* 30.6* 30.9* 30.4*   MCV 86   < > 93 95   < > 92 91 93 92 93 94 93 93 92      < > 209 225   < > 264 256 249 213 206 229 205 208 187    < > = values in this interval not displayed.       Recent Labs   Lab Test 08/05/20  0335 08/10/20  1610 09/10/20  0830 09/17/20  1554 09/18/20  1713 09/19/20  0530   BILITOTAL 0.9 0.8 0.8 0.7 0.6 0.7   ALKPHOS 71 69 94 119 97 92   ALBUMIN 4.0 3.7 3.8 3.6 3.4 3.4   AST 34 39 42 37 30 29   ALT 34 39 48 59 49 45       Recent Labs   Lab Test 05/23/17  1157 05/24/17  0652 05/24/17  0834 05/25/17  0742 05/26/17  0700  06/22/17  1655 12/19/17  1002 04/13/18  1437 07/06/18  0856 01/15/20  2208 01/15/20  2210 01/20/20  1759 01/21/20  0011 01/21/20  0639 08/05/20  0629   PCAL  --  0.07 0.07  --   --   --   --   --   --   --  0.17  --   --  0.48  --   --    LACT  --   --   --   --   --    < > 0.9  --   --   --  1.4 1.2 2.1*  --  1.9 1.1   CRP 29.0*  --   --   --   --   --   --  5.7 9.4* 26.0*  --   --   --   --   --   --    SED  --  17  --  21* 25*   --   --   --  18  --   --   --   --   --   --   --     < > = values in this interval not displayed.       Recent Labs   Lab Test 09/21/20  2010   VANCOMYCIN 22.1       Culture Results:  7-Day Micro Results       Procedure Component Value Units Date/Time    Blood culture [[id number]] Collected:  09/23/20 0518    Order Status:  Completed Lab Status:  Preliminary result Updated:  09/24/20 0733    Specimen:  Blood      Specimen Description Blood Left Arm     Culture Micro No growth after 23 hours    Blood culture [[id number]] Collected:  09/22/20 0441    Order Status:  Completed Lab Status:  Preliminary result Updated:  09/24/20 0732    Specimen:  Blood      Specimen Description Blood Left Arm     Culture Micro No growth after 2 days    Blood culture [[id number]] Collected:  09/21/20 0721    Order Status:  Completed Lab Status:  Preliminary result Updated:  09/24/20 0732    Specimen:  Blood      Specimen Description Blood Left Arm     Culture Micro No growth after 3 days    Blood culture [[id number]] Collected:  09/20/20 1642    Order Status:  Completed Lab Status:  Preliminary result Updated:  09/24/20 0732    Specimen:  Blood      Specimen Description Blood Left Arm     Culture Micro No growth after 4 days    Blood culture [[id number]] Collected:  09/19/20 0824    Order Status:  Completed Lab Status:  Preliminary result Updated:  09/24/20 0731    Specimen:  Blood      Specimen Description Blood Left Arm     Culture Micro No growth after 5 days    Blood culture [[id number]]  (Abnormal) Collected:  09/18/20 2035    Order Status:  Completed Lab Status:  Final result Updated:  09/22/20 1232    Specimen:  Blood from Arm, Right      Specimen Description Blood Right Arm     Culture Micro Cultured on the 2nd day of incubation:  Staphylococcus hominis  Susceptibility testing done on previous specimen        Critical Value/Significant Value, preliminary result only, called to and read back by  Tamy Leventhal RN [id number] 9/20/20 AM      Blood culture  [[id number]] Collected:  09/18/20 1713    Order Status:  Completed Lab Status:  Final result Updated:  09/24/20 0731    Specimen:  Blood from Arm, Right      Specimen Description Blood Right Arm     Culture Micro No growth    Blood culture [[id number]]  (Abnormal)  (Susceptibility) Collected:  09/17/20 1737    Order Status:  Completed Lab Status:  Final result Updated:  09/20/20 0732    Specimen:  Blood from Arm, Right      Specimen Description Blood Right Arm     Culture Micro Cultured on the 1st day of incubation:  Staphylococcus hominis        Critical Value/Significant Value, preliminary result only, called to and read back by  John Howard RN @1414 09/18/2020 hdh/lm        (Note)  POSITIVE for Staphylococci other than S.aureus, S.epidermidis and  S.lugdunensis, by [employer] multiplex nucleic acid test.  Coagulase-negative staphylococci are the most common venipuncture or  collection associated skin CONTAMINANTS grown in blood cultures.  Final identification and antimicrobial susceptibility testing will be  verified by standard methods.    Specimen tested with Verigene multiplex, gram-positive blood culture  nucleic acid test for the following targets: Staph aureus, Staph  epidermidis, Staph lugdunensis, other Staph species, Enterococcus  faecalis, Enterococcus faecium, Streptococcus species, S. agalactiae,  S. anginosus grp., S. pneumoniae, S. pyogenes, Listeria sp., mecA  (methicillin resistance) and Lucía/B (vancomycin resistance).    Critical Value/Significant Value called to and read back by Le Carmona RN. @1712. 9.18.20. BS.       Susceptibility       Staphylococcus hominis (1)       Antibiotic Interpretation Sensitivity Method Status    CIPROFLOXACIN Sensitive <=0.5 ug/mL RENETTA Final    CLINDAMYCIN Resistant >=8 ug/mL RENETTA Final    ERYTHROMYCIN Resistant >=8 ug/mL RENETTA Final    GENTAMICIN Sensitive <=0.5 ug/mL RENETTA Final    LEVOFLOXACIN Sensitive <=0.12 ug/mL RENETTA Final    NITROFURANTOIN [*]  Sensitive <=16 ug/mL  RENETTA Final    OXACILLIN Sensitive <=0.25 ug/mL RENETTA Final    RIFAMPIN [*]  Sensitive <=0.5 ug/mL RENETTA Final    TETRACYCLINE Resistant >=16 ug/mL RENETTA Final    VANCOMYCIN Sensitive 1 ug/mL RENETTA Final    LINEZOLID [*]  Sensitive 2 ug/mL RENETTA Final    Quinupristin/Dalfopr [*]  Sensitive 0.5 ug/ml RENETTA Final    MOXIFLOXACIN [*]  Sensitive <=0.25 ug/mL RENETTA Final    Cefoxitin Screen [*]  Sensitive Negative  RENETTA Final    TIGECYCLINE [*]  Sensitive 0.5 ug/mL RENETTA Final    Inducible Clinda [*]  Sensitive Negative ug/mL RENETTA Final               [*]   Suppressed Antibiotic                   Blood culture [[id number]] Collected:  09/17/20 1554    Order Status:  Completed Lab Status:  Final result Updated:  09/23/20 0345    Specimen:  Blood from Arm, Right      Specimen Description Blood Right Arm     Culture Micro No growth            Recent Labs   Lab Test 05/23/17  2100 02/12/19  0142 01/16/20  0503 09/10/20  0921   URINEPH 5.5 6.0 5.5 6.5   NITRITE Negative Negative Negative Negative   LEUKEST Negative Negative Negative Negative   WBCU <1  --  <1 0 - 5             Recent Labs   Lab Test 01/15/20  2210   IFLUA Not Detected   FLUAH1 Not Detected   FLUAH3 Not Detected   [id number] Not Detected   IFLUB Detected, Abnormal Result*   RSVA Not Detected   RSVB Not Detected   PIV1 Not Detected   PIV2 Not Detected   PIV3 Not Detected   HMPV Not Detected             Imaging: Us Abdomen Complete    Result Date: 9/22/2020  EXAMINATION: US ABDOMEN COMPLETE, 9/22/2020 8:30 AM COMPARISON: CT chest abdomen and pelvis 9/21/2020 HISTORY: transplant work up TECHNIQUE: The abdomen was scanned in standard fashion with specialized ultrasound transducer(s) using both grey scale and limited color Doppler techniques. FINDINGS: Fluid: No evidence of ascites or pleural effusions. Liver: The liver demonstrates increased echogenicity and normal echotexture, measuring 14.6 cm in craniocaudal dimension. There is no focal mass. Gallbladder: Cholelithiasis, without  gallbladder wall thickening or pericholecystic fluid. The common bile duct is not visualized on this exam. Pancreas: Visualized portions of the head and body of the pancreas are unremarkable. Spleen: The spleen measures 8.7 cm in craniocaudal dimension. No focal masses. Kidney: The right and left kidney lengths are 10.1 and 11.1 cm respectively. There is no hydronephrosis or hydroureter, no shadowing renal calculi, cystic lesion or mass. Vessels: Abdominal aorta is not well-visualized on this exam, however IVC is within normal limits.     IMPRESSION: 1.  Increased hepatic echogenicity is suggestive of hepatic steatosis. 2.  Cholelithiasis without additional sonographic evidence of acute cholecystitis. I have personally reviewed the examination and initial interpretation and I agree with the findings. JOANNE MCMAHAN MD    Echocardiogram Complete    Result Date: 2020  [id number]^BRANDON^TRISH^A    [facility] [address] [place] [id number]  Name: LOIS FULLER MRN: [id number] : 1955 Study Date: 2020 08:57 AM Age: 65 yrs Gender: Male Patient Location: [facility] Reason For Study: CHF Ordering Physician: TRISH TAYLOR Performed By: SONY Miller  BSA: 2.1 m2 Height: 68 in Weight: 225 lb BP: 97/85 mmHg _____________________________________________________________________________ __   Procedure LVAD Echocardiogram with two-dimensional, color and spectral Doppler performed. _____________________________________________________________________________ __   Interpretation Summary HM2 at 9400RPM. Severely (EF <30%) reduced left ventricular function is present. Mild left ventricular dilation is present. LVIDd 61mm. Septum midline. Aortic valve remain closed with no AI. Normal inflow velocities (62 cm/s). Outflow velocity cannot be assessed. Global right ventricular function is mildly to moderately reduced.  Dilation of the inferior vena cava is present with abnormal respiratory variation in diameter suggests a high RA pressure estimated at 15 mmHg or greater. Mild to moderate mitral insufficiency is present.  This study was compared with the study from 8/5/2020 . IVC is dilated now with higher RA pressure. Otherwise no significant change. _____________________________________________________________________________ __   Left Ventricle LV eccentric hypertrophy. Mild left ventricular dilation is present. Severely (EF <30%) reduced left ventricular function is present. Diastolic function not assessed due to presence of LVAD. Severe diffuse hypokinesis is present.  Right Ventricle Cannot assess RV size. Global right ventricular function is mildly to moderately reduced. A pacemaker lead is noted in the right ventricle.  Atria The atria cannot be assessed.   Mitral Valve Mild to moderate mitral insufficiency is present.  Aortic Valve Aortic valve remain closed. Mild aortic valve calcification is present. No aortic regurgitation is present.  Tricuspid Valve The valve leaflets are not well visualized. Trace tricuspid insufficiency is present. The right ventricular systolic pressure is approximated at 32.5 mmHg plus the right atrial pressure.  Pulmonic Valve The pulmonic valve cannot be assessed.  Vessels The aorta root is normal. Dilation of the inferior vena cava is present with abnormal respiratory variation in diameter. IVC diameter >2.1 cm collapsing <50% with sniff suggests a high RA pressure estimated at 15 mmHg or greater.  Pericardium No pericardial effusion is present.   Compared to Previous Study This study was compared with the study from 8/5/2020 . IVC is dilated now with higher RA pressure. Otherwise no significant change. _____________________________________________________________________________ __  MMode/2D Measurements & Calculations IVSd: 0.84 cm LVIDd: 6.1 cm LVIDs: 5.6 cm LVPWd: 0.86 cm FS: 7.9 % LV  mass(C)d: 206.4 grams LV mass(C)dI: 96.1 grams/m2 RWT: 0.28   Doppler Measurements & Calculations TR max sloane: 285.0 cm/sec TR max P.5 mmHg  _____________________________________________________________________________ __    Report approved by: Kailee LOERA 2020 10:38 AM      Us Upper Ext Arterial Duplex Bilateral    Result Date: 2020  BILATERAL UPPER EXTREMITY DUPLEX ARTERIAL ULTRASOUND 2020 CLINICAL HISTORY: Transplant workup. Patient with LVAD. COMPARISONS: None available. REFERRING PROVIDER: ARIC GREER TECHNIQUE: Bilateral subclavian through axillary arteries evaluated with grayscale, color Doppler, and Doppler waveform ultrasound. FINDINGS: Abnormal waveforms consistent with LVAD. RIGHT:      Subclavian artery, central: 29 cm/s, 10.6 mm      Subclavian artery, mid: 22 cm/s, 10.2 mm      Axillary artery: 19 cm/s, 8.6 mm LEFT:      Subclavian artery, central: 49 cm/s, 7.7 mm      Subclavian artery, mid: 33 cm/s, 8.7 mm      Axillary artery: 43 cm/s, 8.3 mm     IMPRESSION: Abnormal waveforms consistent with LVAD. Patent subclavian and axillary arteries with measurements as in the report. DAVID MARIN MD    Us Upper Extremity Venous Duplex Bilat    Result Date: 2020  EXAMINATION: DOPPLER VENOUS ULTRASOUND OF BILATERAL UPPER EXTREMITIES, 2020 4:15 PM COMPARISON: None. HISTORY: Transplant workup TECHNIQUE:  Gray-scale evaluation with compression, spectral flow, and color Doppler assessment of the deep venous system of both upper extremities. FINDINGS: Normal blood flow and waveforms are demonstrated in the internal jugular, innominate, subclavian, and axillary veins bilaterally. There is normal compressibility of the brachial, basilic and cephalic veins bilaterally.     IMPRESSION: No evidence of deep venous thrombosis in either upper extremity. I have personally reviewed the examination and initial interpretation and I agree with the findings. JOANNE MCMAHAN MD    Xr Chest Port  1 View    Result Date: 9/23/2020  Exam: XR CHEST PORT 1 VW, 9/23/2020 11:25 AM Indication: PICC Comparison: 9/18/2020 Findings: Right upper extremity PICC tip at the low SVC. Median sternotomy wires. Left chest wall automatic implantable cardiac defibrillator leads are intact and in stable position. Partially visualized LVAD in stable position. Stable enlarged cardiac silhouette and prominent central pulmonary vasculature. No appreciable pleural effusion or pneumothorax. No focal airspace opacity.     Impression: Right upper extremity PICC tip at the low SVC. TERA LEGGETT, DO    Xr Chest Port 1 View    Result Date: 9/18/2020  Exam: XR CHEST PORT 1 VW, 9/18/2020 10:07 PM Indication: + blood culture r/o PNA Comparison: 9/17/2020 Findings: AP portable view of the chest. Implantable cardiac device and LVAD in similar position to previous. Sternotomy wires present. Stable cardiomegaly. Upper abdomen is unremarkable. No significant pleural effusion. No pneumothorax. Stable mixed interstitial/airspace opacities in the lungs. Heart size is upper limits of normal. Aortic arch atherosclerosis.     Impression: Unchanged pulmonary edema. Underlying infection not completely excluded. LVAD. Implantable cardiac defibrillator. Borderline cardiomegaly. Aortic atherosclerosis. I have personally reviewed the examination and initial interpretation and I agree with the findings. PADILLA MCCORMICK MD    Xr Chest Port 1 View    Result Date: 9/17/2020  XR CHEST PORT 1 VW  9/17/2020 4:43 PM  HISTORY: dyspnea COMPARISON: CT chest dated 5/20/2020 FINDINGS: AP chest radiograph. Left chest wall implantable cardiac device. LVAD. Sternotomy wires. Bilateral perihilar interstitial streaky opacities. No pneumothorax or pleural effusion. No acute osseous or upper abdominal abnormality.     IMPRESSION: Findings suggesting pulmonary edema. Also consider atelectasis or infection. I have personally reviewed the examination and initial interpretation  and I agree with the findings. MICHAEL FERNANDEZ MD    Ct Chest Abdomen Pelvis W/o Contrast    Result Date: 9/21/2020  CT CHEST ABDOMEN PELVIS W/O CONTRAST 9/21/2020 5:57 PM History: PLEASE EVALUATE FOR DRIVELINE INFECTION IN THIS PATIENT WITH BACTEREMIA Comparison: Chest x-ray 9/18/2020, CT chest 8/5/2020 Technique: Helical CT acquisition from the lung apices to the pubic symphysis was obtained without intravenous contrast. Axial, coronal, and sagittal reconstructions were obtained and reviewed. Contrast: None Findings: CHEST: Lungs: Part solid nodule in the anterior left upper lobe on series 4, image 93 measuring 6 mm with solid portion measuring approximately 3 mm. Groundglass opacity in the right upper lobe measuring 23 mm on series 4, image 64. Atelectatic changes in the right lung base. No pneumothorax or pleural effusion. Remainder the lungs are clear. Airways: Central tracheobronchial tree is clear. Vessels: Main pulmonary artery and aorta are normal in caliber. Normal three-vessel arch. Mild arterial calcification. Heart: Cardiac size is enlarged, predominantly left heart. No pericardial effusion. Left-sided pacer defibrillator with leads in the right atrium, ventricle, and coronary sinus. Postoperative changes of HeartMate 2 left ventricular assist device. Components are in good apposition and appear intact. There is a small amount of stranding without significant fluid as the driveline courses through the anterior rectus abdominous and series 2, image 114. Driveline is intact without other areas of fluid surrounding it. Lymph nodes: No suspicious mediastinal or hilar lymphadenopathy. Thyroid: Within normal limits. Esophagus: Within normal limits ABDOMEN PELVIS: Liver: Normal parenchymal attenuation without focal mass. Biliary system: Layering gallstones. No adjacent inflammatory change.. No intrahepatic or extrahepatic biliary ductal dilatation. Pancreas: No focal mass or dilation of the main pancreatic  duct. Stomach: Postoperative changes of Sylvester-en-Y gastric bypass.. Spleen: Within normal limits. Adrenal glands: Within normal limits. Kidneys: No focal mass, hydronephrosis, or stone. Symmetric mild perinephric stranding. Bladder: Within normal limits. Reproductive organs: Within normal limits. Colon: Moderate fecal burden throughout the colon. Appendix: Not visualized. Small Bowel: Within normal limits. Lymph nodes: No intra-abdominal or pelvic lymphadenopathy. Vasculature: Within normal limits other than mild to moderate arterial calcification.. Soft tissues: No suspicious soft tissue mass or fluid collection. Bilateral gynecomastia. Bones: No suspicious osseous lesion. Mild degenerative changes of the lower thoracic spine.     IMPRESSION: 1. Stable small amount of stranding around the driveline as it enters the rectus abdominis muscle. No identifiable fluid collection suggests driveline infection. LVAD appears otherwise normal on this noncontrast exam. 2. Subsolid nodule measuring 6 mm in the left upper lobe and a groundglass nodule in the right upper lobe measuring 23 mm. These are likely infectious and there short interval development since 8/5/2020. 3. Cholelithiasis. I have personally reviewed the examination and initial interpretation and I agree with the findings. ILYA HOBBS MD

## 2020-09-24 NOTE — PLAN OF CARE
6C OT: Cancel     Multiple attempts, pt w/multiple providers this PM. Will cancel and reschedule for 9/24

## 2020-09-24 NOTE — PLAN OF CARE
D: Pt admitted for bacteremia. PMHx NICM s/p HM2 (2017), as DT d/t obesity, Afib, RV failure, CKD, DM2, CECILIA, HTN, asthma.      I: Monitored vitals and assessed pt status.   Changed: Xa recheck at 1300  Running: Heparin @ 1050 unit(s)/hr  PRN: melatonin  Tele: SR  O2: Ra/home CPAP @ HS  Mobility: independent     A: A0x4. VSS. LVAD #'s WNL except flows mid 7's at beginning of shift and trended down appropriately. No alarms overnight. DL dressing changed yesterday evening, WNL. Afebrile. Urinating adequately. Weight trending down. LBM 9/23. Denies pain. R DL PICC. 0200  per pt home device. Able to make needs known.      P: Continue to monitor Pt status and report changes to Cards 2. Monitor INR. PICC PLC today at noon. Plan for discharge with IV abx via  home infusion services.

## 2020-09-24 NOTE — CONSULTS
Met with patient in room for PICC teaching. Patient will do q 24 hour Vancomycin via home pump for 4-6 weeks.    Patient hoping the infusions can be done in the evening when his daughter is done with schooling for the day.    Patient demonstrated understanding of end cap change, IV flush with saline and heparin and connecting home pump tubing.    Asked questions along the way to verify understanding of the big picture. Wife present for most of class, has done some infusions in the past.    Literature given: Handwashing and Skin Care, Getting Ready for Your PICC, Caring for Your PICC at Home, Changing the End Cap, Flushing the Line with Heparin, Saline or Citrate, Instructions for IV Medicine Ball

## 2020-09-25 ENCOUNTER — APPOINTMENT (OUTPATIENT)
Dept: OCCUPATIONAL THERAPY | Facility: CLINIC | Age: 65
DRG: 871 | End: 2020-09-25
Payer: COMMERCIAL

## 2020-09-25 LAB
ANION GAP SERPL CALCULATED.3IONS-SCNC: 5 MMOL/L (ref 3–14)
ANION GAP SERPL CALCULATED.3IONS-SCNC: 5 MMOL/L (ref 3–14)
BACTERIA SPEC CULT: NO GROWTH
BUN SERPL-MCNC: 20 MG/DL (ref 7–30)
BUN SERPL-MCNC: 22 MG/DL (ref 7–30)
CALCIUM SERPL-MCNC: 8.7 MG/DL (ref 8.5–10.1)
CALCIUM SERPL-MCNC: 8.8 MG/DL (ref 8.5–10.1)
CHLORIDE SERPL-SCNC: 100 MMOL/L (ref 94–109)
CHLORIDE SERPL-SCNC: 106 MMOL/L (ref 94–109)
CO2 SERPL-SCNC: 29 MMOL/L (ref 20–32)
CO2 SERPL-SCNC: 30 MMOL/L (ref 20–32)
CREAT SERPL-MCNC: 1.34 MG/DL (ref 0.66–1.25)
CREAT SERPL-MCNC: 1.42 MG/DL (ref 0.66–1.25)
ERYTHROCYTE [DISTWIDTH] IN BLOOD BY AUTOMATED COUNT: 17.3 % (ref 10–15)
GFR SERPL CREATININE-BSD FRML MDRD: 52 ML/MIN/{1.73_M2}
GFR SERPL CREATININE-BSD FRML MDRD: 56 ML/MIN/{1.73_M2}
GLUCOSE SERPL-MCNC: 142 MG/DL (ref 70–99)
GLUCOSE SERPL-MCNC: 86 MG/DL (ref 70–99)
HCT VFR BLD AUTO: 32.6 % (ref 40–53)
HGB BLD-MCNC: 9.6 G/DL (ref 13.3–17.7)
INR PPP: 1.62 (ref 0.86–1.14)
LDH SERPL L TO P-CCNC: 284 U/L (ref 85–227)
LMWH PPP CHRO-ACNC: 0.16 IU/ML
MAGNESIUM SERPL-MCNC: 2.4 MG/DL (ref 1.6–2.3)
MAGNESIUM SERPL-MCNC: 2.5 MG/DL (ref 1.6–2.3)
MCH RBC QN AUTO: 27.1 PG (ref 26.5–33)
MCHC RBC AUTO-ENTMCNC: 29.4 G/DL (ref 31.5–36.5)
MCV RBC AUTO: 92 FL (ref 78–100)
PLATELET # BLD AUTO: 205 10E9/L (ref 150–450)
POTASSIUM SERPL-SCNC: 3.8 MMOL/L (ref 3.4–5.3)
POTASSIUM SERPL-SCNC: 4.6 MMOL/L (ref 3.4–5.3)
RBC # BLD AUTO: 3.54 10E12/L (ref 4.4–5.9)
SODIUM SERPL-SCNC: 134 MMOL/L (ref 133–144)
SODIUM SERPL-SCNC: 141 MMOL/L (ref 133–144)
SPECIMEN SOURCE: NORMAL
WBC # BLD AUTO: 5.1 10E9/L (ref 4–11)

## 2020-09-25 PROCEDURE — 36415 COLL VENOUS BLD VENIPUNCTURE: CPT | Performed by: STUDENT IN AN ORGANIZED HEALTH CARE EDUCATION/TRAINING PROGRAM

## 2020-09-25 PROCEDURE — 83735 ASSAY OF MAGNESIUM: CPT | Performed by: STUDENT IN AN ORGANIZED HEALTH CARE EDUCATION/TRAINING PROGRAM

## 2020-09-25 PROCEDURE — 99233 SBSQ HOSP IP/OBS HIGH 50: CPT | Mod: 25 | Performed by: INTERNAL MEDICINE

## 2020-09-25 PROCEDURE — 21400000 ZZH R&B CCU UMMC

## 2020-09-25 PROCEDURE — 85610 PROTHROMBIN TIME: CPT | Performed by: STUDENT IN AN ORGANIZED HEALTH CARE EDUCATION/TRAINING PROGRAM

## 2020-09-25 PROCEDURE — 97110 THERAPEUTIC EXERCISES: CPT | Mod: GO | Performed by: OCCUPATIONAL THERAPIST

## 2020-09-25 PROCEDURE — 25000128 H RX IP 250 OP 636: Performed by: INTERNAL MEDICINE

## 2020-09-25 PROCEDURE — 97530 THERAPEUTIC ACTIVITIES: CPT | Mod: GO | Performed by: OCCUPATIONAL THERAPIST

## 2020-09-25 PROCEDURE — 25000132 ZZH RX MED GY IP 250 OP 250 PS 637: Performed by: STUDENT IN AN ORGANIZED HEALTH CARE EDUCATION/TRAINING PROGRAM

## 2020-09-25 PROCEDURE — 25000128 H RX IP 250 OP 636: Performed by: STUDENT IN AN ORGANIZED HEALTH CARE EDUCATION/TRAINING PROGRAM

## 2020-09-25 PROCEDURE — 25000132 ZZH RX MED GY IP 250 OP 250 PS 637

## 2020-09-25 PROCEDURE — 25000132 ZZH RX MED GY IP 250 OP 250 PS 637: Performed by: INTERNAL MEDICINE

## 2020-09-25 PROCEDURE — 85520 HEPARIN ASSAY: CPT | Performed by: STUDENT IN AN ORGANIZED HEALTH CARE EDUCATION/TRAINING PROGRAM

## 2020-09-25 PROCEDURE — 25000131 ZZH RX MED GY IP 250 OP 636 PS 637: Performed by: STUDENT IN AN ORGANIZED HEALTH CARE EDUCATION/TRAINING PROGRAM

## 2020-09-25 PROCEDURE — 85027 COMPLETE CBC AUTOMATED: CPT | Performed by: STUDENT IN AN ORGANIZED HEALTH CARE EDUCATION/TRAINING PROGRAM

## 2020-09-25 PROCEDURE — 83615 LACTATE (LD) (LDH) ENZYME: CPT | Performed by: STUDENT IN AN ORGANIZED HEALTH CARE EDUCATION/TRAINING PROGRAM

## 2020-09-25 PROCEDURE — 93750 INTERROGATION VAD IN PERSON: CPT | Performed by: INTERNAL MEDICINE

## 2020-09-25 PROCEDURE — 80048 BASIC METABOLIC PNL TOTAL CA: CPT | Performed by: STUDENT IN AN ORGANIZED HEALTH CARE EDUCATION/TRAINING PROGRAM

## 2020-09-25 PROCEDURE — 25800030 ZZH RX IP 258 OP 636: Performed by: INTERNAL MEDICINE

## 2020-09-25 RX ORDER — WARFARIN SODIUM 7.5 MG/1
7.5 TABLET ORAL
Status: COMPLETED | OUTPATIENT
Start: 2020-09-25 | End: 2020-09-25

## 2020-09-25 RX ADMIN — ACETAMINOPHEN 650 MG: 325 TABLET, FILM COATED ORAL at 13:39

## 2020-09-25 RX ADMIN — POTASSIUM CHLORIDE 20 MEQ: 750 TABLET, EXTENDED RELEASE ORAL at 09:21

## 2020-09-25 RX ADMIN — SPIRONOLACTONE 25 MG: 25 TABLET ORAL at 09:20

## 2020-09-25 RX ADMIN — HYDRALAZINE HYDROCHLORIDE 30 MG: 10 TABLET, FILM COATED ORAL at 09:20

## 2020-09-25 RX ADMIN — BUMETANIDE 2 MG: 2 TABLET ORAL at 09:21

## 2020-09-25 RX ADMIN — CITALOPRAM HYDROBROMIDE 30 MG: 20 TABLET ORAL at 21:07

## 2020-09-25 RX ADMIN — WARFARIN SODIUM 7.5 MG: 7.5 TABLET ORAL at 18:48

## 2020-09-25 RX ADMIN — ALLOPURINOL 200 MG: 100 TABLET ORAL at 09:21

## 2020-09-25 RX ADMIN — BUMETANIDE 2 MG: 2 TABLET ORAL at 16:10

## 2020-09-25 RX ADMIN — MONTELUKAST 10 MG: 10 TABLET, FILM COATED ORAL at 21:07

## 2020-09-25 RX ADMIN — INSULIN ASPART 3 UNITS: 100 INJECTION, SOLUTION INTRAVENOUS; SUBCUTANEOUS at 14:15

## 2020-09-25 RX ADMIN — TRAMADOL HYDROCHLORIDE 50 MG: 50 TABLET, FILM COATED ORAL at 09:18

## 2020-09-25 RX ADMIN — HEPARIN SODIUM 900 UNITS/HR: 10000 INJECTION, SOLUTION INTRAVENOUS at 16:09

## 2020-09-25 RX ADMIN — VANCOMYCIN HYDROCHLORIDE 2000 MG: 1 INJECTION, POWDER, LYOPHILIZED, FOR SOLUTION INTRAVENOUS at 09:22

## 2020-09-25 RX ADMIN — ACETAMINOPHEN 650 MG: 325 TABLET, FILM COATED ORAL at 09:18

## 2020-09-25 RX ADMIN — PANTOPRAZOLE SODIUM 40 MG: 40 TABLET, DELAYED RELEASE ORAL at 09:20

## 2020-09-25 RX ADMIN — FLUTICASONE FUROATE AND VILANTEROL TRIFENATATE 1 PUFF: 200; 25 POWDER RESPIRATORY (INHALATION) at 09:20

## 2020-09-25 RX ADMIN — INSULIN ASPART 1 UNITS: 100 INJECTION, SOLUTION INTRAVENOUS; SUBCUTANEOUS at 17:38

## 2020-09-25 RX ADMIN — ASPIRIN 81 MG CHEWABLE TABLET 81 MG: 81 TABLET CHEWABLE at 09:21

## 2020-09-25 RX ADMIN — HYDRALAZINE HYDROCHLORIDE 30 MG: 10 TABLET, FILM COATED ORAL at 21:07

## 2020-09-25 RX ADMIN — AMIODARONE HYDROCHLORIDE 400 MG: 200 TABLET ORAL at 09:20

## 2020-09-25 RX ADMIN — GABAPENTIN 600 MG: 300 CAPSULE ORAL at 21:07

## 2020-09-25 RX ADMIN — PREDNISONE 15 MG: 5 TABLET ORAL at 09:20

## 2020-09-25 RX ADMIN — INSULIN GLARGINE 3 UNITS: 100 INJECTION, SOLUTION SUBCUTANEOUS at 09:19

## 2020-09-25 RX ADMIN — Medication 3 MG: at 21:06

## 2020-09-25 RX ADMIN — HYDRALAZINE HYDROCHLORIDE 30 MG: 10 TABLET, FILM COATED ORAL at 16:10

## 2020-09-25 RX ADMIN — UMECLIDINIUM 1 PUFF: 62.5 AEROSOL, POWDER ORAL at 09:19

## 2020-09-25 ASSESSMENT — ACTIVITIES OF DAILY LIVING (ADL)
ADLS_ACUITY_SCORE: 12

## 2020-09-25 ASSESSMENT — PAIN DESCRIPTION - DESCRIPTORS
DESCRIPTORS: ACHING;THROBBING
DESCRIPTORS: THROBBING;ACHING

## 2020-09-25 NOTE — PROGRESS NOTES
Care Coordinator - Discharge Planning    Admission Date/Time:  9/18/2020  Attending MD:  Nicola Seth MD     Data  Date of initial CC assessment:9/23/20  Chart reviewed, discussed with interdisciplinary team.   Patient was admitted for:   1. Bacteremia    2. Positive blood culture        Assessment   Full assessment completed in previous note    Coordination of Care and Referrals:  Plan had been to discharge today with [facility] to follow for IV ancef. Patient has PICC and had PLC appointment yesterday.   Per team today, INR is not yet therapeutic so unable to discharge. They are expecting patient to be here through weekend but he could discharge over weekend if therapeutic INR. I have updated [facility] liaison, Rolf. He will have weekend staff watch pt as a potential discharge.  I spoke with patient he is aware of discharge pending INR level.      Plan  Anticipated Discharge Date: 1-3 days, pending INR. Anticipated Discharge Plan: Home with [facility] for IV ancef.      Benson Purdy, RN Care Coordinator [phone]

## 2020-09-25 NOTE — PLAN OF CARE
Temp: 98.9  F (37.2  C) Temp src: Oral BP: 94/82 Pulse: 64   Resp: 16 SpO2: 96 % O2 Device: BiPAP/CPAP       D: Admitted for bacteremia. Hx NICM s/p HM2 (2017), Afib, RV failure, CKD, DM, CECILIA, HTN, asthma    I/A: [place] (he/him) is A&O x4. Tele in place, SR. VSS on RA, CPAP HS. PICC in place infusing heparin at 900u/hr. VAD numbers WDL. Up independently. Slept well overnight    P: Continue to monitor and follow POC. Notify Cards 2 with changes

## 2020-09-25 NOTE — PROGRESS NOTES
Cardiology Progress Note  Jim Willingham MRN: [id number]  Age: 65 year old, : 20    Assessment & Plan   Jim Willingham is a 65 year old male with PMHx significant for NICM (EF 20%) s/p HM II LVAD placement (17) as DT due to obesity, A-fib, RV failure, CKD 3, DM2 c/b neuropathy, CECILIA, HTN, and Asthma who was admitted with staph hominis bacteremia, currently on vancomycin with plan to discharge home with IV vancomycin but is currently being bridged with heparin for subtherapeutic INR.     Changes today  - IV abx teaching today  - INR subtherpeutic     Staph hominis bacteremia  BCx  with GPC in clusters in 1/2 bottles. Presented with 2-3 days of chills, myalgias, headaches. WBC 5.6. Initially sent home after BCx drawn on Levaquin. Source unknown, LVAD site appears clean, dressing changed last week.  - CT chest, abdomen, and pelvis without contrast done (2020), showed no identifiable fluid collection suggestive of driveline infection.  - Antibiotics:             -Levaquin  -              -Zosyn  -               -vancomycin, -present             - ID following, appreciate reccs                          > Continue vancomycin, goal trough 15-20.                          > Continue vancomycin until follow up with Dr. Sinclair in the ID clinic on 10/22 at 2pm (scheduled).                           > Check CBC and CMP and have results faxed to Dr. Sinclair in the [facility] ID clinic.   - BCx  : 1/2 staph hominis              : 1/2 gram positive cocci in clusters              : NGTD              : NGTD              : NGTD              : NGTD     Chronic systolic heart failure secondary to NICM  Stage D  NYHA Class IIIA  ACEi/ARB: Losartan stopped d/t fluctuating renal function  Afterload reduction:  hydral 30 mg TID  BB: Stopped during past admission  Aldosterone antagonist: Spironolactone 25 mg daily  SCD  prophylaxis: ICD  Fluid status: hypervolemic. Continue Bumex 2 mg BID.      S/P LVAD implant as DT due to obesity  Goal is to lose weight to be eligible for transplant.  Anticoagulation: Warfarin with INR goal of 2-3,INR today was 1.62, warfarin per Pharmacy. continue IV heparin drip.  Antiplatelet: Aspirin 81 mg daily.  MAP: Goal 65-85   LDH: Stable  VAD Interrogation today: all parameters WNL      PAF  - continue amiodarone  - Warfarin per pharmacy dosing.     CKD 3  BL in the past year has been 1.2-1.4. Likely new BL 1.4-1.6 given recent WALTER.  -Scr today was 1.24 mg/dl.     DM2  Last A1c 7.5 on 8/2020. On Lantus 6U qam and novolog.  - Lantus 3U qam, med ssi, hypoglycemic protocol  - RN to use Dexcom readings and no fingersticks, QID     DM neuropathy  Chronic pain  - Continue gabapentin and tramadol  - Prednisone 15 mg daily. Reportedly has been on it for many yrs.               >Taper to 10 mg on 9/29 and 5 mg on 10/6.              >Let Rheum know about pt discharge for outpatient follow up.     Chronic Problems  Depression: Continue Celexa.   GERD: Continue Protonix.   Gout: Continue Allopurinol.   Obesity s/p Gastric Bypass with Chronic anemia: on IV Iron infusions  Asthma: Continue home inhalers.   CECILIA: Continue home cpap     FEN: carb-consistent diet  Prophylaxis: warfarin, per pharmacy. IV heparin.   Code Status: Full code  Disposition: pending therapeutic INR.    The patient's care was discussed with the Attending Physician, Dr. Michele Lawrence MD  Internal Medicine, PGY-2  [facility], [place]  Pager: [phone]    Interval History   NAEO. Had teaching this morning. Denies f/c, pain, SOB.    Physical Exam   Temp: 98.9  F (37.2  C) Temp src: Oral BP: 94/82 Pulse: 64   Resp: 16 SpO2: 96 % O2 Device: BiPAP/CPAP    Vitals:    09/23/20 0615 09/24/20 0325 09/25/20 0652   Weight: 105.7 kg (233 lb) 104.5 kg (230 lb 6.4 oz) 100.8 kg (222 lb 4.8 oz)     Vital Signs with  Ranges  Temp:  [98.3  F (36.8  C)-98.9  F (37.2  C)] 98.9  F (37.2  C)  Pulse:  [64-74] 64  Resp:  [16-18] 16  BP: ()/(65-88) 94/82  SpO2:  [96 %-97 %] 96 %  I/O last 3 completed shifts:  In: 538.16 [P.O.:240; I.V.:298.16]  Out: 2250 [Urine:2250]  Constitutional: awake, alert, cooperative, no apparent distress  HENT: PERRL, EOM grossly intact, sclera anicteric, oral pharynx with moist mucous membranes  Respiratory: non-labored respirations on room-air, CTAB, no crackles or wheezing, no cough  Cardiovascular:  LVAD Hum, no BLE edema  GI: soft, non-distended, non-tender  Skin: LVAD drive line insertion c/d/i, R picc c/d/i  Neurologic: Cranial nerves II-XII are grossly intact, moving all extremities equally and independently      Medications     HEParin 900 Units/hr (09/24/20 2022)     Warfarin Therapy Reminder         allopurinol  200 mg Oral Daily     amiodarone  400 mg Oral Daily     aspirin  81 mg Oral or Feeding Tube Daily     bumetanide  2 mg Oral BID     citalopram  30 mg Oral At Bedtime     fluticasone-vilanterol  1 puff Inhalation Daily     gabapentin  600 mg Oral At Bedtime     heparin lock flush  5-10 mL Intracatheter Q24H     hydrALAZINE  30 mg Oral TID     insulin aspart  1-7 Units Subcutaneous TID AC     insulin aspart  1-5 Units Subcutaneous At Bedtime     insulin glargine  3 Units Subcutaneous QAM AC     montelukast  10 mg Oral At Bedtime     pantoprazole  40 mg Oral QAM AC     [Held by provider] potassium chloride  40 mEq Oral BID     predniSONE  15 mg Oral Daily     spironolactone  25 mg Oral Daily     umeclidinium  1 puff Inhalation Daily     vancomycin (VANCOCIN) IV  2,000 mg Intravenous Q24H     warfarin ANTICOAGULANT  7.5 mg Oral ONCE at 18:00       Data   Recent Labs   Lab 09/25/20  0510 09/24/20  1647 09/24/20  0543  09/23/20  1428 09/23/20  0518  09/19/20  0530 09/18/20  1713   WBC 5.1  --  5.2  --  5.6 5.0   < > 7.1 5.6   HGB 9.6*  --  8.8*  --  8.8* 8.7*   < > 9.5* 9.3*   MCV 92  --  92   --  93 93   < > 93 91     --  187  --  208 205   < > 249 256   INR 1.62*  --  1.63*  --   --  1.76*   < > 4.09*  --     135 138   < >  --  138   < > 138 137   POTASSIUM 3.8 4.9 3.8   < >  --  3.9   < > 3.4 4.2   CHLORIDE 106 104 105   < >  --  106   < > 106 106   CO2 30 26 28   < >  --  28   < > 25 26   BUN 20 19 19   < >  --  21   < > 21 22   CR 1.34* 1.42* 1.24   < >  --  1.33*   < > 1.56* 1.64*   ANIONGAP 5 5 5   < >  --  4   < > 8 6   QAMAR 8.7 8.4* 8.2*   < >  --  8.2*   < > 8.6 8.5   GLC 86 220* 81   < >  --  120*   < > 94 169*   ALBUMIN  --   --   --   --   --   --   --  3.4 3.4   PROTTOTAL  --   --   --   --   --   --   --  6.7* 6.6*   BILITOTAL  --   --   --   --   --   --   --  0.7 0.6   ALKPHOS  --   --   --   --   --   --   --  92 97   ALT  --   --   --   --   --   --   --  45 49   AST  --   --   --   --   --   --   --  29 30    < > = values in this interval not displayed.       No results found for this or any previous visit (from the past 24 hour(s)).

## 2020-09-25 NOTE — PLAN OF CARE
Discharge Planner OT   Patient plan for discharge: home  Current status: Pt completed intermittent hallway ambulation x8 min with seated rest breaks prn. IND with basic ADLs in room. VSS on room air  Barriers to return to prior living situation: medical status  Recommendations for discharge: Home with family assist prn  Rationale for recommendations: Pt progressing as expected, but would benefit from continued therapy to progress functional endurance       Entered by: Vanessa Johnson 09/25/2020 12:15 PM

## 2020-09-26 ENCOUNTER — APPOINTMENT (OUTPATIENT)
Dept: OCCUPATIONAL THERAPY | Facility: CLINIC | Age: 65
DRG: 871 | End: 2020-09-26
Payer: COMMERCIAL

## 2020-09-26 LAB
ANION GAP SERPL CALCULATED.3IONS-SCNC: 4 MMOL/L (ref 3–14)
ANION GAP SERPL CALCULATED.3IONS-SCNC: 5 MMOL/L (ref 3–14)
BACTERIA SPEC CULT: NO GROWTH
BUN SERPL-MCNC: 22 MG/DL (ref 7–30)
BUN SERPL-MCNC: 25 MG/DL (ref 7–30)
CALCIUM SERPL-MCNC: 8.5 MG/DL (ref 8.5–10.1)
CALCIUM SERPL-MCNC: 8.6 MG/DL (ref 8.5–10.1)
CHLORIDE SERPL-SCNC: 102 MMOL/L (ref 94–109)
CHLORIDE SERPL-SCNC: 98 MMOL/L (ref 94–109)
CO2 SERPL-SCNC: 29 MMOL/L (ref 20–32)
CO2 SERPL-SCNC: 31 MMOL/L (ref 20–32)
CREAT SERPL-MCNC: 1.49 MG/DL (ref 0.66–1.25)
CREAT SERPL-MCNC: 1.78 MG/DL (ref 0.66–1.25)
ERYTHROCYTE [DISTWIDTH] IN BLOOD BY AUTOMATED COUNT: 17.5 % (ref 10–15)
GFR SERPL CREATININE-BSD FRML MDRD: 40 ML/MIN/{1.73_M2}
GFR SERPL CREATININE-BSD FRML MDRD: 49 ML/MIN/{1.73_M2}
GLUCOSE SERPL-MCNC: 285 MG/DL (ref 70–99)
GLUCOSE SERPL-MCNC: 90 MG/DL (ref 70–99)
HCT VFR BLD AUTO: 33.1 % (ref 40–53)
HGB BLD-MCNC: 9.6 G/DL (ref 13.3–17.7)
INR PPP: 1.63 (ref 0.86–1.14)
LDH SERPL L TO P-CCNC: 273 U/L (ref 85–227)
LMWH PPP CHRO-ACNC: 0.2 IU/ML
MAGNESIUM SERPL-MCNC: 2.3 MG/DL (ref 1.6–2.3)
MAGNESIUM SERPL-MCNC: 2.4 MG/DL (ref 1.6–2.3)
MCH RBC QN AUTO: 26.7 PG (ref 26.5–33)
MCHC RBC AUTO-ENTMCNC: 29 G/DL (ref 31.5–36.5)
MCV RBC AUTO: 92 FL (ref 78–100)
PLATELET # BLD AUTO: 193 10E9/L (ref 150–450)
POTASSIUM SERPL-SCNC: 3.9 MMOL/L (ref 3.4–5.3)
POTASSIUM SERPL-SCNC: 5.6 MMOL/L (ref 3.4–5.3)
RBC # BLD AUTO: 3.59 10E12/L (ref 4.4–5.9)
SODIUM SERPL-SCNC: 131 MMOL/L (ref 133–144)
SODIUM SERPL-SCNC: 138 MMOL/L (ref 133–144)
SPECIMEN SOURCE: NORMAL
WBC # BLD AUTO: 5.7 10E9/L (ref 4–11)

## 2020-09-26 PROCEDURE — 97110 THERAPEUTIC EXERCISES: CPT | Mod: GO

## 2020-09-26 PROCEDURE — 25000132 ZZH RX MED GY IP 250 OP 250 PS 637: Performed by: INTERNAL MEDICINE

## 2020-09-26 PROCEDURE — 36415 COLL VENOUS BLD VENIPUNCTURE: CPT | Performed by: STUDENT IN AN ORGANIZED HEALTH CARE EDUCATION/TRAINING PROGRAM

## 2020-09-26 PROCEDURE — 25000132 ZZH RX MED GY IP 250 OP 250 PS 637: Performed by: STUDENT IN AN ORGANIZED HEALTH CARE EDUCATION/TRAINING PROGRAM

## 2020-09-26 PROCEDURE — 25000128 H RX IP 250 OP 636: Performed by: INTERNAL MEDICINE

## 2020-09-26 PROCEDURE — 25800030 ZZH RX IP 258 OP 636: Performed by: INTERNAL MEDICINE

## 2020-09-26 PROCEDURE — 25000131 ZZH RX MED GY IP 250 OP 636 PS 637: Performed by: STUDENT IN AN ORGANIZED HEALTH CARE EDUCATION/TRAINING PROGRAM

## 2020-09-26 PROCEDURE — 85610 PROTHROMBIN TIME: CPT | Performed by: STUDENT IN AN ORGANIZED HEALTH CARE EDUCATION/TRAINING PROGRAM

## 2020-09-26 PROCEDURE — 85027 COMPLETE CBC AUTOMATED: CPT | Performed by: STUDENT IN AN ORGANIZED HEALTH CARE EDUCATION/TRAINING PROGRAM

## 2020-09-26 PROCEDURE — 80048 BASIC METABOLIC PNL TOTAL CA: CPT | Performed by: STUDENT IN AN ORGANIZED HEALTH CARE EDUCATION/TRAINING PROGRAM

## 2020-09-26 PROCEDURE — 99233 SBSQ HOSP IP/OBS HIGH 50: CPT | Mod: 25 | Performed by: INTERNAL MEDICINE

## 2020-09-26 PROCEDURE — 83735 ASSAY OF MAGNESIUM: CPT | Performed by: STUDENT IN AN ORGANIZED HEALTH CARE EDUCATION/TRAINING PROGRAM

## 2020-09-26 PROCEDURE — 93750 INTERROGATION VAD IN PERSON: CPT | Performed by: INTERNAL MEDICINE

## 2020-09-26 PROCEDURE — 36592 COLLECT BLOOD FROM PICC: CPT | Performed by: STUDENT IN AN ORGANIZED HEALTH CARE EDUCATION/TRAINING PROGRAM

## 2020-09-26 PROCEDURE — 83615 LACTATE (LD) (LDH) ENZYME: CPT | Performed by: STUDENT IN AN ORGANIZED HEALTH CARE EDUCATION/TRAINING PROGRAM

## 2020-09-26 PROCEDURE — 25000128 H RX IP 250 OP 636: Performed by: STUDENT IN AN ORGANIZED HEALTH CARE EDUCATION/TRAINING PROGRAM

## 2020-09-26 PROCEDURE — 21400000 ZZH R&B CCU UMMC

## 2020-09-26 PROCEDURE — 85520 HEPARIN ASSAY: CPT | Performed by: STUDENT IN AN ORGANIZED HEALTH CARE EDUCATION/TRAINING PROGRAM

## 2020-09-26 PROCEDURE — 90662 IIV NO PRSV INCREASED AG IM: CPT | Performed by: STUDENT IN AN ORGANIZED HEALTH CARE EDUCATION/TRAINING PROGRAM

## 2020-09-26 PROCEDURE — 25000132 ZZH RX MED GY IP 250 OP 250 PS 637

## 2020-09-26 RX ORDER — LANOLIN ALCOHOL/MO/W.PET/CERES
6 CREAM (GRAM) TOPICAL
Status: DISCONTINUED | OUTPATIENT
Start: 2020-09-26 | End: 2020-09-27 | Stop reason: HOSPADM

## 2020-09-26 RX ADMIN — INSULIN GLARGINE 3 UNITS: 100 INJECTION, SOLUTION SUBCUTANEOUS at 08:39

## 2020-09-26 RX ADMIN — INSULIN ASPART 2 UNITS: 100 INJECTION, SOLUTION INTRAVENOUS; SUBCUTANEOUS at 14:34

## 2020-09-26 RX ADMIN — PANTOPRAZOLE SODIUM 40 MG: 40 TABLET, DELAYED RELEASE ORAL at 08:38

## 2020-09-26 RX ADMIN — MELATONIN TAB 3 MG 6 MG: 3 TAB at 21:53

## 2020-09-26 RX ADMIN — BUMETANIDE 2 MG: 2 TABLET ORAL at 08:39

## 2020-09-26 RX ADMIN — ACETAMINOPHEN 650 MG: 325 TABLET, FILM COATED ORAL at 08:38

## 2020-09-26 RX ADMIN — VANCOMYCIN HYDROCHLORIDE 2000 MG: 1 INJECTION, POWDER, LYOPHILIZED, FOR SOLUTION INTRAVENOUS at 08:36

## 2020-09-26 RX ADMIN — TRAMADOL HYDROCHLORIDE 50 MG: 50 TABLET, FILM COATED ORAL at 21:54

## 2020-09-26 RX ADMIN — FLUTICASONE FUROATE AND VILANTEROL TRIFENATATE 1 PUFF: 200; 25 POWDER RESPIRATORY (INHALATION) at 08:39

## 2020-09-26 RX ADMIN — MONTELUKAST 10 MG: 10 TABLET, FILM COATED ORAL at 21:53

## 2020-09-26 RX ADMIN — INFLUENZA A VIRUS A/MICHIGAN/45/2015 X-275 (H1N1) ANTIGEN (FORMALDEHYDE INACTIVATED), INFLUENZA A VIRUS A/SINGAPORE/INFIMH-16-0019/2016 IVR-186 (H3N2) ANTIGEN (FORMALDEHYDE INACTIVATED), INFLUENZA B VIRUS B/PHUKET/3073/2013 ANTIGEN (FORMALDEHYDE INACTIVATED), AND INFLUENZA B VIRUS B/MARYLAND/15/2016 BX-69A ANTIGEN (FORMALDEHYDE INACTIVATED) 0.7 ML: 60; 60; 60; 60 INJECTION, SUSPENSION INTRAMUSCULAR at 12:49

## 2020-09-26 RX ADMIN — CITALOPRAM HYDROBROMIDE 30 MG: 20 TABLET ORAL at 21:54

## 2020-09-26 RX ADMIN — HEPARIN SODIUM 900 UNITS/HR: 10000 INJECTION, SOLUTION INTRAVENOUS at 19:54

## 2020-09-26 RX ADMIN — WARFARIN SODIUM 12.5 MG: 10 TABLET ORAL at 18:25

## 2020-09-26 RX ADMIN — ASPIRIN 81 MG CHEWABLE TABLET 81 MG: 81 TABLET CHEWABLE at 08:39

## 2020-09-26 RX ADMIN — INSULIN ASPART 2 UNITS: 100 INJECTION, SOLUTION INTRAVENOUS; SUBCUTANEOUS at 18:28

## 2020-09-26 RX ADMIN — PREDNISONE 15 MG: 5 TABLET ORAL at 08:37

## 2020-09-26 RX ADMIN — HYDRALAZINE HYDROCHLORIDE 30 MG: 10 TABLET, FILM COATED ORAL at 19:54

## 2020-09-26 RX ADMIN — TRAMADOL HYDROCHLORIDE 50 MG: 50 TABLET, FILM COATED ORAL at 08:38

## 2020-09-26 RX ADMIN — GABAPENTIN 600 MG: 300 CAPSULE ORAL at 21:53

## 2020-09-26 RX ADMIN — SPIRONOLACTONE 25 MG: 25 TABLET ORAL at 08:39

## 2020-09-26 RX ADMIN — HYDRALAZINE HYDROCHLORIDE 30 MG: 10 TABLET, FILM COATED ORAL at 08:40

## 2020-09-26 RX ADMIN — BUMETANIDE 2 MG: 2 TABLET ORAL at 16:27

## 2020-09-26 RX ADMIN — AMIODARONE HYDROCHLORIDE 400 MG: 200 TABLET ORAL at 08:40

## 2020-09-26 RX ADMIN — POTASSIUM CHLORIDE 20 MEQ: 750 TABLET, EXTENDED RELEASE ORAL at 08:37

## 2020-09-26 RX ADMIN — ALLOPURINOL 200 MG: 100 TABLET ORAL at 08:39

## 2020-09-26 RX ADMIN — HYDRALAZINE HYDROCHLORIDE 30 MG: 10 TABLET, FILM COATED ORAL at 13:44

## 2020-09-26 RX ADMIN — UMECLIDINIUM 1 PUFF: 62.5 AEROSOL, POWDER ORAL at 08:39

## 2020-09-26 RX ADMIN — ACETAMINOPHEN 650 MG: 325 TABLET, FILM COATED ORAL at 16:28

## 2020-09-26 ASSESSMENT — ACTIVITIES OF DAILY LIVING (ADL)
ADLS_ACUITY_SCORE: 12

## 2020-09-26 ASSESSMENT — PAIN DESCRIPTION - DESCRIPTORS
DESCRIPTORS: ACHING;THROBBING
DESCRIPTORS: ACHING;THROBBING

## 2020-09-26 NOTE — PROGRESS NOTES
Cardiology Progress Note  Jim Willingham MRN: [id number]  Age: 65 year old, : 20    Assessment & Plan   Jim Willingham is a 65 year old male with PMHx significant for NICM (EF 20%) s/p HM II LVAD placement (17) as DT due to obesity, A-fib, RV failure, CKD 3, DM2 c/b neuropathy, CECILIA, HTN, and Asthma who was admitted with staph hominis bacteremia, currently on vancomycin with plan to discharge home with IV vancomycin but is currently being bridged with heparin for subtherapeutic INR.     Changes today  - INR subtherapeutic, for discharge once INR within therapeutic range.  - increase Melatonin 6 mg prn.       Staph hominis bacteremia  BCx  with GPC in clusters in 1/2 bottles. Presented with 2-3 days of chills, myalgias, headaches. WBC 5.6. Initially sent home after BCx drawn on Levaquin. Source unknown, LVAD site appears clean, dressing changed last week.  - CT chest, abdomen, and pelvis without contrast done (2020), showed no identifiable fluid collection suggestive of driveline infection.  - Antibiotics:             -Levaquin  -              -Zosyn  -               -vancomycin, -present             - ID following, appreciate reccs                          > Continue vancomycin, goal trough 15-20.                          > Continue vancomycin until follow up with Dr. Sinclair in the ID clinic on 10/22 at 2pm (scheduled).                           > Check CBC and CMP and have results faxed to Dr. Sinclair in the [facility] ID clinic.   - BCx  : 1/2 staph hominis              : 1/2 gram positive cocci in clusters              : NGTD              : NGTD              : NGTD              : NGTD     Chronic systolic heart failure secondary to NICM  Stage D  NYHA Class IIIA  ACEi/ARB: Losartan stopped d/t fluctuating renal function  Afterload reduction:  hydral 30 mg TID  BB: Stopped during past  admission  Aldosterone antagonist: Spironolactone 25 mg daily  SCD prophylaxis: ICD  Fluid status: hypervolemic. Continue Bumex 2 mg BID.      S/P LVAD implant as DT due to obesity  Goal is to lose weight to be eligible for transplant.  Anticoagulation: Warfarin with INR goal of 2-3,INR today was 1.63, warfarin per Pharmacy. continue IV heparin drip.  Antiplatelet: Aspirin 81 mg daily.  MAP: Goal 65-85   LDH: Stable  VAD Interrogation today: all parameters WNL      PAF  - continue amiodarone  - Warfarin per pharmacy dosing.     CKD 3  BL in the past year has been 1.2-1.4. Likely new BL 1.4-1.6 given recent WALTER.  -Scr today was 1.49 mg/dl      DM2  Last A1c 7.5 on 8/2020. On Lantus 3U qam and novolog.  - Lantus 3U qam, med ssi, hypoglycemic protocol  - RN to use Dexcom readings and no fingersticks, QID     DM neuropathy  Chronic pain  - Continue gabapentin and tramadol  - Prednisone 15 mg daily. Reportedly has been on it for many yrs.               >Taper to 10 mg on 9/29 and 5 mg on 10/6.              >Let Rheum know about pt discharge for outpatient follow up.     Chronic Problems  Insomnia: increase melatonin 6 mg bedtime prn.  Depression: Continue Celexa.   GERD: Continue Protonix.   Gout: Continue Allopurinol.   Obesity s/p Gastric Bypass with Chronic anemia: on IV Iron infusions  Asthma: Continue home inhalers.   CECILIA: Continue home cpap     FEN: carb-consistent diet  Prophylaxis: warfarin, per pharmacy. IV heparin.   Code Status: Full code  Disposition: pending therapeutic INR.    The patient's care was discussed with the Attending Physician, Dr. Michele Cabrales MD  Internal Medicine, PGY-1  [facility] [place]  Pager: *[phone]    Interval History   NAEO.   C/o lower quality of sleep w/ current melatonin dose (3 mg).  Weight up ~2lbs in last 24 hrs.    Physical Exam   Temp: 98.5  F (36.9  C) Temp src: Oral BP: (!) 78/63 Pulse: 61   Resp: 16 SpO2: 96 % O2 Device: None (Room  air)    Vitals:    09/23/20 0615 09/24/20 0325 09/25/20 0652   Weight: 105.7 kg (233 lb) 104.5 kg (230 lb 6.4 oz) 100.8 kg (222 lb 4.8 oz)     Vital Signs with Ranges  Temp:  [97.5  F (36.4  C)-98.5  F (36.9  C)] 98.5  F (36.9  C)  Pulse:  [] 61  Resp:  [16-20] 16  BP: ()/(30-97) 78/63  SpO2:  [96 %-98 %] 96 %  I/O last 3 completed shifts:  In: 3027.15 [P.O.:2320; I.V.:707.15]  Out: 2700 [Urine:2700]  Constitutional: awake, alert, cooperative, no apparent distress  HENT: PERRL, EOM grossly intact, sclera anicteric, oral pharynx with moist mucous membranes.  Respiratory: non-labored respirations on room-air, CTAB, no crackles or wheezing, no cough.  Cardiovascular:  LVAD Hum, 1+ BLE edema.  GI: soft, non-distended, non-tender.  Skin: LVAD drive line insertion c/d/i, R picc c/d/i.  Neurologic: Cranial nerves II-XII are grossly intact, moving all extremities equally and independently.      Medications     HEParin 900 Units/hr (09/26/20 0600)     Warfarin Therapy Reminder         allopurinol  200 mg Oral Daily     amiodarone  400 mg Oral Daily     aspirin  81 mg Oral or Feeding Tube Daily     bumetanide  2 mg Oral BID     citalopram  30 mg Oral At Bedtime     fluticasone-vilanterol  1 puff Inhalation Daily     gabapentin  600 mg Oral At Bedtime     heparin lock flush  5-10 mL Intracatheter Q24H     hydrALAZINE  30 mg Oral TID     influenza vac high-dose quad  0.7 mL Intramuscular Prior to discharge     insulin aspart  1-7 Units Subcutaneous TID AC     insulin aspart  1-5 Units Subcutaneous At Bedtime     insulin glargine  3 Units Subcutaneous QAM AC     montelukast  10 mg Oral At Bedtime     pantoprazole  40 mg Oral QAM AC     [Held by provider] potassium chloride  40 mEq Oral BID     predniSONE  15 mg Oral Daily     spironolactone  25 mg Oral Daily     umeclidinium  1 puff Inhalation Daily     vancomycin (VANCOCIN) IV  2,000 mg Intravenous Q24H     warfarin ANTICOAGULANT  12.5 mg Oral ONCE at 18:00        Data   Recent Labs   Lab 09/26/20  0515 09/25/20  1735 09/25/20  0510  09/24/20  0543   WBC 5.7  --  5.1  --  5.2   HGB 9.6*  --  9.6*  --  8.8*   MCV 92  --  92  --  92     --  205  --  187   INR 1.63*  --  1.62*  --  1.63*    134 141   < > 138   POTASSIUM 3.9 4.6 3.8   < > 3.8   CHLORIDE 102 100 106   < > 105   CO2 31 29 30   < > 28   BUN 22 22 20   < > 19   CR 1.49* 1.42* 1.34*   < > 1.24   ANIONGAP 5 5 5   < > 5   QAMAR 8.5 8.8 8.7   < > 8.2*   GLC 90 142* 86   < > 81    < > = values in this interval not displayed.       No results found for this or any previous visit (from the past 24 hour(s)).

## 2020-09-26 NOTE — PLAN OF CARE
"Discharge Planner OT   Patient plan for discharge: Home w/Assist   Current status: IND changing LVAD to battery. Ambulated ~500ft w/SBA using unilateral support w/IV pole, required 2 sitting rest breaks for ~4 min each 2/2 fatigue and mild SOB. Educated pt on EC strategies especially w/. Endorsed \"6/10\" on effort scale at end of session. VSS throughout, PI # [id number].   Barriers to return to prior living situation: decreased act brigid, medical status   Recommendations for discharge: Home w/Assist PRN   Rationale for recommendations: Pt progressing as expected, but would benefit from continued therapy to progress functional endurance       Entered by: Ronit Natarajan 09/26/2020 1:20 PM       "

## 2020-09-26 NOTE — PLAN OF CARE
D/A/I:  Patient A&O x4, denied palpitations, difficulty breathing, SOB, dizziness, and nausea.  Lung sounds clear to auscultation, LVAD hum noted.  Had +1 edema in ankles and feet, was given scheduled bumetanide.  Patient had good urine output, see I&O flowsheet.  In sinus rhythm with first degree AV block, BBB, and rare PVCs, HR 60s-70s, SBP 80s-110s, SaO2 96-98% on room air.  HeartMate II LVAD running at a fixed rate of 9400 rpm, no alarms during shift.  Flow 6.1-6.8, PI 4.2-4.9.  Drive line site clean and dry, no drainage noted.  Heparin gtt continued at 900 units/hr, 10a level therapeutic.  Patient requested to have IV vancomycin administered in afternoon/evening; spoke with pharmacist and determined that administration at that time would not allow accurate trough blood levels to be determined.  Explained situation to patient, he verbalized understanding.  Potassium replaced per protocol.  Reported left shoulder pain that was managed with prn acetaminophen and tramadol.  Ambulated in room and hallway independently with steady gait.    P:  Continue to monitor pain, VS, heart rhythm, LVAD function, drive line site integrity, fluid status, bowel status, cardiac and respiratory status.  Notify care team of changes in patient condition or other concerns.  Expected to discharge when INR is therapeutic.

## 2020-09-26 NOTE — PLAN OF CARE
Temp: 98.5  F (36.9  C) Temp src: Oral BP: (!) 78/63 Pulse: 69   Resp: 18 SpO2: 96 % O2 Device: None (Room air)       D: Admitted for bacteremia. Hx NICM s/p HM2 (2017), afib, RV failure, CKD, DM, CECILIA, HTN, asthma     I/A: [place] (he/him) is A&O x4. Tele in place, SR. VSS on RA, CPAP HS. PICC in place infusing heparin at 900u/hr. VAD numbers WDL. Up independently. Slept well overnight     P: Continue to monitor and follow POC. When INR is therapeutic, will discharge with home Vanco. Notify Cards 2 with changes

## 2020-09-27 ENCOUNTER — PATIENT OUTREACH (OUTPATIENT)
Dept: CARE COORDINATION | Facility: CLINIC | Age: 65
End: 2020-09-27

## 2020-09-27 ENCOUNTER — HOME INFUSION (PRE-WILLOW HOME INFUSION) (OUTPATIENT)
Dept: PHARMACY | Facility: CLINIC | Age: 65
End: 2020-09-27

## 2020-09-27 VITALS
WEIGHT: 221 LBS | HEART RATE: 67 BPM | RESPIRATION RATE: 16 BRPM | DIASTOLIC BLOOD PRESSURE: 88 MMHG | TEMPERATURE: 98.1 F | SYSTOLIC BLOOD PRESSURE: 96 MMHG | BODY MASS INDEX: 33.6 KG/M2 | OXYGEN SATURATION: 97 %

## 2020-09-27 LAB
ANION GAP SERPL CALCULATED.3IONS-SCNC: 2 MMOL/L (ref 3–14)
BACTERIA SPEC CULT: NO GROWTH
BUN SERPL-MCNC: 27 MG/DL (ref 7–30)
CALCIUM SERPL-MCNC: 8.5 MG/DL (ref 8.5–10.1)
CHLORIDE SERPL-SCNC: 100 MMOL/L (ref 94–109)
CO2 SERPL-SCNC: 32 MMOL/L (ref 20–32)
CREAT SERPL-MCNC: 1.63 MG/DL (ref 0.66–1.25)
ERYTHROCYTE [DISTWIDTH] IN BLOOD BY AUTOMATED COUNT: 17.7 % (ref 10–15)
GFR SERPL CREATININE-BSD FRML MDRD: 44 ML/MIN/{1.73_M2}
GLUCOSE SERPL-MCNC: 97 MG/DL (ref 70–99)
HCT VFR BLD AUTO: 35 % (ref 40–53)
HGB BLD-MCNC: 10.3 G/DL (ref 13.3–17.7)
INR PPP: 1.94 (ref 0.86–1.14)
LDH SERPL L TO P-CCNC: 278 U/L (ref 85–227)
LMWH PPP CHRO-ACNC: 0.16 IU/ML
MAGNESIUM SERPL-MCNC: 2.6 MG/DL (ref 1.6–2.3)
MCH RBC QN AUTO: 26.8 PG (ref 26.5–33)
MCHC RBC AUTO-ENTMCNC: 29.4 G/DL (ref 31.5–36.5)
MCV RBC AUTO: 91 FL (ref 78–100)
PLATELET # BLD AUTO: 200 10E9/L (ref 150–450)
POTASSIUM SERPL-SCNC: 3.9 MMOL/L (ref 3.4–5.3)
RBC # BLD AUTO: 3.84 10E12/L (ref 4.4–5.9)
SODIUM SERPL-SCNC: 134 MMOL/L (ref 133–144)
SPECIMEN SOURCE: NORMAL
VANCOMYCIN SERPL-MCNC: 21.8 MG/L
WBC # BLD AUTO: 5.5 10E9/L (ref 4–11)

## 2020-09-27 PROCEDURE — 25000132 ZZH RX MED GY IP 250 OP 250 PS 637

## 2020-09-27 PROCEDURE — 80048 BASIC METABOLIC PNL TOTAL CA: CPT | Performed by: STUDENT IN AN ORGANIZED HEALTH CARE EDUCATION/TRAINING PROGRAM

## 2020-09-27 PROCEDURE — 83735 ASSAY OF MAGNESIUM: CPT | Performed by: STUDENT IN AN ORGANIZED HEALTH CARE EDUCATION/TRAINING PROGRAM

## 2020-09-27 PROCEDURE — 25000131 ZZH RX MED GY IP 250 OP 636 PS 637: Performed by: STUDENT IN AN ORGANIZED HEALTH CARE EDUCATION/TRAINING PROGRAM

## 2020-09-27 PROCEDURE — 85027 COMPLETE CBC AUTOMATED: CPT | Performed by: STUDENT IN AN ORGANIZED HEALTH CARE EDUCATION/TRAINING PROGRAM

## 2020-09-27 PROCEDURE — 25000128 H RX IP 250 OP 636: Performed by: STUDENT IN AN ORGANIZED HEALTH CARE EDUCATION/TRAINING PROGRAM

## 2020-09-27 PROCEDURE — 25000128 H RX IP 250 OP 636: Performed by: INTERNAL MEDICINE

## 2020-09-27 PROCEDURE — 25000132 ZZH RX MED GY IP 250 OP 250 PS 637: Performed by: STUDENT IN AN ORGANIZED HEALTH CARE EDUCATION/TRAINING PROGRAM

## 2020-09-27 PROCEDURE — 36415 COLL VENOUS BLD VENIPUNCTURE: CPT | Performed by: INTERNAL MEDICINE

## 2020-09-27 PROCEDURE — 83615 LACTATE (LD) (LDH) ENZYME: CPT | Performed by: STUDENT IN AN ORGANIZED HEALTH CARE EDUCATION/TRAINING PROGRAM

## 2020-09-27 PROCEDURE — 36415 COLL VENOUS BLD VENIPUNCTURE: CPT | Performed by: STUDENT IN AN ORGANIZED HEALTH CARE EDUCATION/TRAINING PROGRAM

## 2020-09-27 PROCEDURE — 99239 HOSP IP/OBS DSCHRG MGMT >30: CPT | Mod: GC | Performed by: INTERNAL MEDICINE

## 2020-09-27 PROCEDURE — 85610 PROTHROMBIN TIME: CPT | Performed by: STUDENT IN AN ORGANIZED HEALTH CARE EDUCATION/TRAINING PROGRAM

## 2020-09-27 PROCEDURE — 25800030 ZZH RX IP 258 OP 636: Performed by: INTERNAL MEDICINE

## 2020-09-27 PROCEDURE — 85520 HEPARIN ASSAY: CPT | Performed by: STUDENT IN AN ORGANIZED HEALTH CARE EDUCATION/TRAINING PROGRAM

## 2020-09-27 PROCEDURE — 80202 ASSAY OF VANCOMYCIN: CPT | Performed by: INTERNAL MEDICINE

## 2020-09-27 PROCEDURE — 25000132 ZZH RX MED GY IP 250 OP 250 PS 637: Performed by: INTERNAL MEDICINE

## 2020-09-27 RX ORDER — TRAMADOL HYDROCHLORIDE 50 MG/1
50 TABLET ORAL 2 TIMES DAILY PRN
Qty: 30 TABLET | Refills: 3 | Status: CANCELLED | OUTPATIENT
Start: 2020-09-27

## 2020-09-27 RX ORDER — GABAPENTIN 300 MG/1
600 CAPSULE ORAL AT BEDTIME
Qty: 30 CAPSULE | Refills: 3 | Status: ON HOLD | OUTPATIENT
Start: 2020-09-27 | End: 2021-01-07

## 2020-09-27 RX ORDER — WARFARIN SODIUM 5 MG/1
5 TABLET ORAL
Status: DISCONTINUED | OUTPATIENT
Start: 2020-09-27 | End: 2020-09-27 | Stop reason: HOSPADM

## 2020-09-27 RX ORDER — BUMETANIDE 2 MG/1
2 TABLET ORAL
Qty: 60 TABLET | Refills: 3 | Status: CANCELLED | OUTPATIENT
Start: 2020-09-27 | End: 2020-10-27

## 2020-09-27 RX ORDER — SPIRONOLACTONE 25 MG/1
25 TABLET ORAL DAILY
Qty: 30 TABLET | Refills: 3 | Status: SHIPPED | OUTPATIENT
Start: 2020-09-28 | End: 2020-09-29 | Stop reason: SINTOL

## 2020-09-27 RX ORDER — CITALOPRAM HYDROBROMIDE 10 MG/1
30 TABLET ORAL AT BEDTIME
Qty: 90 TABLET | Refills: 3 | Status: SHIPPED | OUTPATIENT
Start: 2020-09-27 | End: 2020-11-30

## 2020-09-27 RX ORDER — ALLOPURINOL 100 MG/1
200 TABLET ORAL DAILY
Qty: 30 TABLET | Refills: 3 | Status: SHIPPED | OUTPATIENT
Start: 2020-09-28 | End: 2020-11-02

## 2020-09-27 RX ORDER — PREDNISONE 5 MG/1
TABLET ORAL
Qty: 34 TABLET | Refills: 0 | Status: SHIPPED | OUTPATIENT
Start: 2020-09-29 | End: 2020-10-26

## 2020-09-27 RX ORDER — WARFARIN SODIUM 5 MG/1
5 TABLET ORAL DAILY
Qty: 30 TABLET | Refills: 3 | Status: SHIPPED | OUTPATIENT
Start: 2020-09-27 | End: 2020-12-18

## 2020-09-27 RX ADMIN — VANCOMYCIN HYDROCHLORIDE 1750 MG: 1 INJECTION, POWDER, LYOPHILIZED, FOR SOLUTION INTRAVENOUS at 09:00

## 2020-09-27 RX ADMIN — HYDRALAZINE HYDROCHLORIDE 30 MG: 10 TABLET, FILM COATED ORAL at 08:05

## 2020-09-27 RX ADMIN — Medication 5 ML: at 12:29

## 2020-09-27 RX ADMIN — INSULIN ASPART 1 UNITS: 100 INJECTION, SOLUTION INTRAVENOUS; SUBCUTANEOUS at 12:29

## 2020-09-27 RX ADMIN — HYDRALAZINE HYDROCHLORIDE 30 MG: 10 TABLET, FILM COATED ORAL at 13:45

## 2020-09-27 RX ADMIN — PREDNISONE 15 MG: 5 TABLET ORAL at 08:03

## 2020-09-27 RX ADMIN — ACETAMINOPHEN 650 MG: 325 TABLET, FILM COATED ORAL at 15:48

## 2020-09-27 RX ADMIN — AMIODARONE HYDROCHLORIDE 400 MG: 200 TABLET ORAL at 08:05

## 2020-09-27 RX ADMIN — ASPIRIN 81 MG CHEWABLE TABLET 81 MG: 81 TABLET CHEWABLE at 08:05

## 2020-09-27 RX ADMIN — PANTOPRAZOLE SODIUM 40 MG: 40 TABLET, DELAYED RELEASE ORAL at 08:03

## 2020-09-27 RX ADMIN — FLUTICASONE FUROATE AND VILANTEROL TRIFENATATE 1 PUFF: 200; 25 POWDER RESPIRATORY (INHALATION) at 08:05

## 2020-09-27 RX ADMIN — ALLOPURINOL 200 MG: 100 TABLET ORAL at 08:05

## 2020-09-27 RX ADMIN — ACETAMINOPHEN 650 MG: 325 TABLET, FILM COATED ORAL at 08:05

## 2020-09-27 RX ADMIN — POTASSIUM CHLORIDE 20 MEQ: 750 TABLET, EXTENDED RELEASE ORAL at 06:28

## 2020-09-27 RX ADMIN — INSULIN GLARGINE 3 UNITS: 100 INJECTION, SOLUTION SUBCUTANEOUS at 08:05

## 2020-09-27 RX ADMIN — SPIRONOLACTONE 25 MG: 25 TABLET ORAL at 08:05

## 2020-09-27 RX ADMIN — UMECLIDINIUM 1 PUFF: 62.5 AEROSOL, POWDER ORAL at 08:05

## 2020-09-27 RX ADMIN — BUMETANIDE 2 MG: 2 TABLET ORAL at 08:05

## 2020-09-27 RX ADMIN — TRAMADOL HYDROCHLORIDE 50 MG: 50 TABLET, FILM COATED ORAL at 08:05

## 2020-09-27 ASSESSMENT — ACTIVITIES OF DAILY LIVING (ADL)
ADLS_ACUITY_SCORE: 12

## 2020-09-27 ASSESSMENT — PAIN DESCRIPTION - DESCRIPTORS
DESCRIPTORS: ACHING;THROBBING
DESCRIPTORS: ACHING;THROBBING

## 2020-09-27 NOTE — PLAN OF CARE
D/A/I:  Patient A&O x4, denied palpitations, difficulty breathing, SOB, dizziness, and nausea.  Lung sounds clear to auscultation, LVAD hum noted.  Had significant urine output after scheduled bumetanide, see I&O flowsheet.  In sinus rhythm with first degree AV block and BBB, HR 60s-80s, SBP 90s, SaO2 97-98% on room air.  HeartMate II LVAD running at a fixed rate of 9400 rpm, no alarms during shift.  Flow 5.2-5.7, PI 4.8-5.7.  Drive line site clean and dry, no drainage noted.  Heparin gtt discontinued.  Potassium replaced during night shift.  Reported chronic left shoulder pain that was managed with prn acetaminophen and tramadol.  Ambulated in room independently with steady gait.  Verbalized readiness for discharge, sister will drive patient home.    P:  Prepare for discharge.  Ensure patient understands follow-up cares and appointments.  Ensure patient understands and recognizes signs/symptoms that indicate a need for further medical consultation.  Expected to have [facility] meet with patient in his home tomorrow to start antibiotic infusion and ensure all supplies have been obtained.

## 2020-09-27 NOTE — PROGRESS NOTES
Care Coordinator - Discharge Planning    Admission Date/Time:  9/18/2020  Attending MD:  Precious Bautista MD     Data  Chart reviewed, discussed with interdisciplinary team.   Patient was admitted for:   1. Bacteremia    2. Positive blood culture         Assessment   Full assessment completed in previous note     Pt on weekend RN[facility] list for pending discharge.  Per chart review noted that team anticipates discharge pending therapeutic INR.  Noted INR 1.94.  Spoke with Cards 2 regarding anticipated plan for discharge.  Team anticipates pt will discharge home today on q day IV Vanco.       Coordination of Care and Referrals:       Spoke with ANDREW Gould Central Intake RN regarding antcipited plan for discharge.   [facility] will plan to see patient at home on Monday 9/28/2020.    Attempted to reach patient via phone to review anticipated plan for discharge.  Pt also Medicare requiring IMM.    Updated REGINALD Cho.  Per discussion with Tammie pt currently at [facility] via [facility].  Writer will f/u with pt later today.    1128:  Spoke with pt via phone.  Reviewed anticipated plan for discharge.  Pt notes no concerns. IMM reviewed.  Reviewed that copy of IMM will be placed on pt discharge instructions.       Plan  Anticipated Discharge Date:  9/27/2020  Anticipated Discharge Plan:  Home    Maria T Adams RN BSN, PHN, ACM-RN  RN Care Coordinator  Internal Medicine    Weekend RNCC pager [phone]    9/27/2020 11:02 AM

## 2020-09-27 NOTE — PLAN OF CARE
BP (!) 80/64 (BP Location: Left arm)   Pulse 64   Temp 97.7  F (36.5  C) (Oral)   Resp 16   Wt 100.2 kg (221 lb)   SpO2 99%   BMI 33.60 kg/m      D: Patient was admitted after blood culture positive.  Patient is here for treatment of bacteremia and sepsis.  I/A: Patient will be on Vancomycin for six weeks.  He is on warfarin at 900-units/hr, bridging with warfarin.  BS at HS was 115, no need for insulin coverage.  LVAD #s WNL, and took ultram at HS for sleep with CPAP.  Voided copious amt during the shift and weight down by 3LBs.  P: Will continue with monitor INR level to be therapeutically prior to discharging home.

## 2020-09-27 NOTE — DISCHARGE INSTRUCTIONS
IMM reviewed with patient by phone on 9/27/2020 at 1128 a.m. by Angelina Adams RNCC                Low Sodium Nutrition Therapy   Eating less sodium can help you if you have high blood pressure, heart failure, or kidney or liver disease.     Your body needs a little sodium, but too much sodium can cause your body to hold onto extra water. This extra water will raise your blood pressure and can cause damage to your heart, kidneys, or liver as they are forced to work harder.     Sometimes you can see how the extra fluid affects you because your hands, legs, or belly swell. You may also hold water around your heart and lungs, which makes it hard to breathe.      Even if you take medication for blood pressure or a water pill (diuretic) to remove fluid, it is still important to have less salt in your diet.     Check with your primary care provider before drinking alcohol since it may affect the amount of fluid in your body and how your heart, kidneys, or liver work.    If asked to follow a fluid restriction by your care provider, then do so.      Sodium in Food  A low-sodium meal plan limits the sodium that you get from food and beverages to 1,500-2,000 milligrams (mg) per day. Salt is the main source of sodium. Read the nutrition label on the package to find out how much sodium is in one serving of a food.    Select foods with 140 milligrams (mg) of sodium or less per serving.    You may be able to eat one or two servings of foods with a little more than 140 milligrams (mg) of sodium if you are closely watching how much sodium you eat in a day.    Check the serving size on the label. The amount of sodium listed on the label shows the amount in one serving of the food. So, if you eat more than one serving, you will get more sodium than the amount listed.  Tips  Cutting Back on Sodium    Eat more fresh foods.     o Fresh fruits and vegetables are low in sodium, as well as frozen vegetables and fruits that have no added  juices or sauces.  o Fresh meats are lower in sodium than processed meats, such as lee, sausage, and hotdogs.     Not all processed foods are unhealthy, but some processed foods may have too much sodium.    Eat less salt at the table and when cooking. One of the ingredients in salt is sodium.   o One teaspoon of table salt has 2,300 milligrams of sodium.  o Leave the salt out of recipes for pasta, casseroles, and soups.    Be a smart .   o Food packages that say  Salt-free , sodium-free ,  very low sodium,  and  low sodium  have less than 140 milligrams of sodium per serving.   o Beware of products identified as  Unsalted,   No Salt Added,   Reduced Sodium,  or  Lower Sodium.  These items may still be high in sodium. You should always check the nutrition label.     Add flavors to your food without adding sodium.   o Try lemon juice, lime juice, or vinegar.   o Dry or fresh herbs add flavor.   o Buy a sodium-free seasoning blend or make your own at home.    You can purchase salt-free or sodium-free condiments like barbeque sauce in stores and online. Ask your registered dietitian nutritionist for recommendations and where to find them.     Eating in Restaurants  Choose foods carefully when you eat outside your home. Restaurant foods can be very high in sodium. Many restaurants provide nutrition facts on their menus or their websites. If you cannot find that information, ask your . Let your  know that you want your food to be cooked without salt and that you would like your salad dressing and sauces to be served on the side.      Foods Recommended  Food Group Foods Recommended   Grains Bread, bagels, rolls without salted tops  Homemade bread made with reduced-sodium baking powder  Cold cereals, especially shredded wheat and puffed rice  Oats, grits, or cream of wheat  Pastas, quinoa, and rice  Popcorn, pretzels or crackers without salt  Corn tortillas   Protein Foods Fresh meats and fish; turkey  lee (check the nutrition labels - make sure they are not packaged in a sodium solution)  Canned or packed tuna (no more than 4 ounces at 1 serving)  Beans and peas  Soybeans) and tofu  Eggs  Nuts or nut butters without salt   Dairy Milk or milk powder  Plant milks, such as rice and soy  Yogurt, including Greek yogurt  Small amounts of natural cheese (blocks of cheese) or reduced-sodium cheese can be used in moderation. (Swiss, ricotta, and fresh mozzarella cheese are lower in sodium than the others)  Cream Cheese  Low sodium cottage cheese   Vegetables Fresh and frozen vegetables without added sauces or salt  Homemade soups (without salt)  Low-sodium, salt-free or sodium-free canned vegetables and soups   Fruit Fresh and canned fruits  Dried fruits, such as raisins, cranberries, and prunes   Oils Tub or liquid margarine, regular or without salt  Canola, corn, peanut, olive, safflower, or sunflower oils   Condiments Fresh or dried herbs such as basil, bay leaf, dill,  mustard (dry), nutmeg, paprika, parsley, rosemary, alfonso, or thyme.              Low sodium ketchup  Vinegar                                   Lemon or lime juice  Pepper, red pepper flakes, and cayenne.  Hot sauce contains sodium, but if you use just a drop or two, it will not add up to much.                 Salt-free or sodium-free seasoning mixes and marinades  Simple salad dressings: vinegar and oil        Foods Not Recommended  Food Group Foods Not Recommended   Grains Breads or crackers topped with salt  Cereals (hot/cold) with more than 300 mg sodium per serving  Biscuits, cornbread, and other  quick  breads prepared with baking soda  Pre-packaged bread crumbs  Seasoned and packaged rice and pasta mixes  Self-rising flours   Protein Foods Cured meats: Lee, ham, sausage, pepperoni and hot dogs  Canned meats (chili, thien sausage, or sardines)  Smoked fish and meats  Frozen meals that have more than 600 mg of sodium per serving  Egg substitute  (with added sodium)   Dairy Buttermilk  Processed cheese spreads  Cottage cheese (1 cup may have over 500 mg of sodium; look for low-sodium.)  American or feta cheese  Shredded Cheese has more sodium than blocks of cheese  String cheese   Vegetables Canned vegetables (unless they are salt-free, sodium-free or low sodium)  Frozen vegetables with seasoning and sauces  Sauerkraut and pickled vegetables  Canned or dried soups (unless they are salt-free, sodium-free, or low  sodium)  French fries and onion rings   Fruit Dried fruits preserved with additives that have sodium   Oils Salted butter or margarine, all types of olives   Condiments Salt, sea salt, kosher salt, onion salt, and garlic salt  Seasoning mixes with salt  Bouillon cubes  Ketchup  Barbeque sauce and [facility] sauce unless low  sodium  Soy sauce  Salsa, pickles, olives, relish  Salad dressings: ranch, blue cheese, Italian, and French.   Low Sodium Sample 1-Day Menu   Breakfast 1 cup cooked oatmeal   1 slice whole wheat bread toast  1 tablespoon peanut butter without salt   1 banana  1 cup 1% milk   Lunch Tacos made with: 2 corn tortillas     cup black beans, low sodium    cup roasted or grilled chicken (without skin)     avocado  Squeeze of lime juice   1 cup salad greens   1 tablespoon low-sodium salad dressing     cup strawberries  1 orange   Afternoon Snack 1/3 cup grapes   6 ounces yogurt   Evening Meal 3 ounces herb-baked fish   1 baked potato  2 teaspoons olive oil     cup cooked carrots  2 thick slices tomatoes on:  2 lettuce leaves  1 teaspoon olive oil  1 teaspoon balsamic vinegar  1 cup 1% milk   Evening Snack 1 apple     cup almonds without salt

## 2020-09-27 NOTE — PROGRESS NOTES
DISCHARGE   Discharged to: Home  Via: Automobile  Accompanied by: Sister  Discharge Instructions: principal diagnosis, major findings/procedures, diet, activity, medications, follow up appointments, when to call the MD, and what to watchout for (i.e. s/s of infection, increasing SOB, palpitations, Angina). Pt states understanding of all discharge instructions.   Prescriptions: To be filled by home pharmacy per pt's request; scripts sent to pharmacy.  Follow Up Appointments: with PCP in 1 week, with cardiology in 1 week, INR check 9/29, with Dr. Sinclair (Infectious Diseases) on 10/22  Belongings: All sent with pt. Pt verifies that they are taking all belongings with them.   IV: PICC maintained for home infusion use.   Telemetry: discontinued.   Pt exhibits understanding of above discharge instructions; all questions answered.  Discharge Paperwork: faxed to discharge planner.

## 2020-09-27 NOTE — PHARMACY-VANCOMYCIN DOSING SERVICE
Pharmacy Vancomycin Note  Date of Service 2020  Patient's  1955   65 year old, male    Indication: Sepsis  Goal Trough Level: 15-20 mg/L  Day of Therapy: 8  Current Vancomycin regimen:  2000 mg IV q24h    Current estimated CrCl = Estimated Creatinine Clearance: 53.2 mL/min (A) (based on SCr of 1.63 mg/dL (H)).    Creatinine for last 3 days  2020:  4:47 PM Creatinine 1.42 mg/dL  2020:  5:10 AM Creatinine 1.34 mg/dL;  5:35 PM Creatinine 1.42 mg/dL  2020:  5:15 AM Creatinine 1.49 mg/dL;  5:43 PM Creatinine 1.78 mg/dL  2020:  4:52 AM Creatinine 1.63 mg/dL    Recent Vancomycin Levels (past 3 days)  2020:  6:51 AM Vancomycin Level 21.8 mg/L    Vancomycin IV Administrations (past 72 hours)                   vancomycin (VANCOCIN) 2,000 mg in sodium chloride 0.9 % 500 mL intermittent infusion (mg) 2,000 mg New Bag 20 0836     2,000 mg New Bag 20 0922                Nephrotoxins and other renal medications (From now, onward)    Start     Dose/Rate Route Frequency Ordered Stop    20 0800  vancomycin (VANCOCIN) 1,750 mg in sodium chloride 0.9 % 500 mL intermittent infusion      1,750 mg  over 2 Hours Intravenous EVERY 24 HOURS 20 0755      20 1330  bumetanide (BUMEX) tablet 2 mg      2 mg Oral 2 TIMES DAILY. 20 1325               Contrast Orders - past 72 hours (72h ago, onward)    None          Interpretation of levels and current regimen:  Trough level is  Supratherapeutic and not quite to steady state    Serum creatinine has increased in last 72 hours    Urine output:  good urine output    Renal Function: tenuous    Plan:  1.  Decrease Dose to 1750 mg IV q24h  2.  Pharmacy will check trough levels as appropriate in 1-3 Days.    3. Serum creatinine levels will be ordered daily for the first week of therapy and at least twice weekly for subsequent weeks.      Nettie Darby RPH        .

## 2020-09-27 NOTE — PLAN OF CARE
D/A/I:  Patient A&O x4, denied palpitations, difficulty breathing, SOB, dizziness, and nausea.  Lung sounds clear to auscultation, LVAD hum noted.  Had +1 edema in feet, was given scheduled bumetanide.  Patient had good urine output, see I&O flowsheet.  In sinus rhythm with first degree AV block, BBB, and rare PVCs.  HR 60s-70s, SBP 80s-90s, SaO2 97-99% on room air.  HeartMate II LVAD running at a fixed rate of 9400 rpm, no alarms during shift.  Flow 5.0-6.3, PI 4.3-5.5.  Drive line site clean and dry, scant dried drainage on dressing at time of change.  Heparin gtt continued at 900 units/hr, 10a level therapeutic.  Potassium replaced per protocol.  Reported left shoulder pain that was managed with prn acetaminophen and tramadol.  Ambulated in room independently with steady gait.    P:  Continue to monitor pain, VS, heart rhythm, LVAD function, drive line site integrity, fluid status, bowel status, cardiac and respiratory status.  Notify care team of changes in patient condition or other concerns.  Expected to discharge when INR is therapeutic.

## 2020-09-27 NOTE — DISCHARGE SUMMARY
"        [facility], [place]    Cardiology Discharge Summary- Cards 2         Date of Admission:  9/18/2020  Date of Discharge:  9/27/2020  Discharging Provider: Precious Bautista MD.      Discharge Diagnoses   Staph hominis bacteremia  Suspected pneumonia POA treated and resolved.  Chronic systolic heart failure secondary to NICM  Stage D  NYHA Class IIIA  S/P LVAD implant as DT due to obesity  PAF  CKD 3  DM2  DM neuropathy  Chronic pain  Gout      Follow-ups Needed After Discharge   - CBC and CMP, to be faxed to Dr. Sinclair at the [facility] ID clinic.    Unresulted Labs Ordered in the Past 30 Days of this Admission     Date and Time Order Name Status Description    9/22/2020 2300 Blood culture Preliminary     9/21/2020 2300 Blood culture Preliminary         Discharge Disposition   Discharged to home  Condition at discharge: Satisfactory    Hospital Course   Jim Willingham is a 65 year old male with PMHx significant for NICM (EF 20%) s/p HM II LVAD placement (6/19/17) as DT due to obesity, A-fib, RV failure, CKD 3, DM2 c/b neuropathy, CECILIA, HTN, and Asthma who was admitted with bacteremia.     Patient was in usual state of health until Thursday, 9/17 when he started to have myalgias, headaches, chills, sweats, SOB and felt like a \"semi-truck rolled over him\". He was seen in ED with compelete work-up including BCx and thought to possibly have PNA and was discharged on levaquin. He was called in on 9/18/2020 due to positive blood cultures. He stated that he took 2 doses of levaquin and felt better. He however, stated that  it felt like \"a bus ran him over instead of a semi-truck\".  He otherwise continued to have headaches, but denied fevers/chills, vision changes, chest pain, SOB, abd pain, N/V, weakness of his extremities.     Staph hominis bacteremia  Results of Bcx (9/17/2020) grew GPC in clusters in 1/2 bottles. Source unknown, LVAD site appeared clean, dressing changed one week " prior to admission. CT chest, abdomen, and pelvis without contrast done (09/21/2020), showed no identifiable fluid collection suggestive of driveline infection. ID was consulted, he was started on IV vancomycin with a goal trough of 15-20. Results of other blood cultures: 9/17: 1/2 staph hominis; 9/18: 1/2 gram positive cocci in clusters; 9/19: NGTD; 9/20: NGTD; 9/21: NGTD; and 9/22: NGTD. Plan to continue vancomycin as an out-patient until follow up with Dr. Sinclair in the ID clinic on 10/22 at 2pm (scheduled). To have CBC and CMP check and have results faxed to Dr. Sinclair in the [facility] ID clinic.    Chronic systolic heart failure secondary to NICM  Stage D  NYHA Class IIIA  S/P LVAD implant as DT due to obesity  ACEi/ARB: Losartan stopped d/t fluctuating renal function  Afterload reduction:  hydral 30 mg TID  BB: Stopped during past admission  Aldosterone antagonist: Spironolactone 25 mg daily  SCD prophylaxis: ICD  Fluid status: Was hypervolemic, increased Bumex to 2 mg bid. Discharged on Bumex 1 mg BID.   Goal is to lose weight to be eligible for transplant.  Anticoagulation: Warfarin with INR goal of 2-3, INR at discharge was 1.94, warfarin per Pharmacy.   Antiplatelet: Aspirin 81 mg daily.  MAP: Goal 65-85   LDH: Stable. 278 U/L at discharge  VAD Interrogation:  see chart.       PAF  Stayed in sinus rhythm with first degree AV block, BBB, and rare PVCs. Continued amiodarone. Warfarin per pharmacy dosing.     CKD 3  Baseline in the past year has been 1.2-1.4. Likely new BL 1.4-1.6 given recent WALTER. Range during this admission was 1.24-1.78 with good urine output. Scr at discharge was 1.63 mg/dl.     DM2  Last A1c 7.5 on 8/2020. Was placed on Lantus 3U qam, med ssi, and hypoglycemic protocol while on admission. BG monitoring w/ Dexcom readings and no fingersticks, QID. To resume home dosing of Lantus at discharge.     DM neuropathy  Chronic pain  Continued pta gabapentin and tramadol.     Gout  Was on  Prednisone 20 mg daily, reportedly has been on it for many yrs. Rheumatology consulted; Prednisone tapered to 15mg daily on 09/22, to taper to 10 mg on 9/29 and 5 mg on 10/6. Allopurinol increased to 200 mg daily. To follow-up with Rheumatology as an out-patient.       Chronic Problems:  Depression: Continued Celexa.   GERD: Continued Protonix.   Obesity s/p Gastric Bypass with Chronic anemia: Received two IV Iron infusions of 300mg (Venofer), well tolerated. Last dose on 09/24/2020.  Asthma: Continued home inhalers.   CECILIA: Continued home cpap    Consultations This Hospital Stay   PHARMACY TO DOSE VANCO  CARDIAC REHAB IP CONSULT  NUTRITION SERVICES ADULT IP CONSULT  PHARMACY TO DOSE VANCO  IP RESPIRATORY CARE CHRONIC PULMONARY DISEASE SPECIALIST  RHEUMATOLOGY IP CONSULT  INFECTIOUS DISEASE GENERAL ADULT IP CONSULT  PATIENT LEARNING CENTER IP CONSULT  PHARMACY TO DOSE WARFARIN  VASCULAR ACCESS FOR PICC PLACEMENT ADULT IP CONSULT  SMOKING CESSATION PROGRAM IP CONSULT  INTERVENTIONAL RADIOLOGY ADULT/PEDS IP CONSULT    Code Status   Full Code      Patient seen and discussed with Dr. Precious Bautista.    Jeffrey Cabrales MD   Internal Medicine Resident, PGY-1  [facility], [place]  ______________________________________________________________________    Physical Exam   Vital Signs: Temp: 97.9  F (36.6  C) Temp src: Oral BP: 90/79 Pulse: 62   Resp: 16 SpO2: 97 % O2 Device: None (Room air)    Weight: 221 lbs 0 oz  Physical Exam  Constitutional:       General: He is not in acute distress.     Appearance: He is not ill-appearing or diaphoretic.   HENT:      Head: Normocephalic and atraumatic.      Mouth/Throat:      Mouth: Mucous membranes are moist.      Pharynx: Oropharynx is clear. No oropharyngeal exudate or posterior oropharyngeal erythema.   Eyes:      General: No scleral icterus.     Extraocular Movements: Extraocular movements intact.      Conjunctiva/sclera: Conjunctivae normal.      Pupils:  Pupils are equal, round, and reactive to light.   Cardiovascular:      Comments: LVAD hum.  Pulmonary:      Effort: Pulmonary effort is normal. No respiratory distress.      Breath sounds: Normal breath sounds. No stridor. No wheezing, rhonchi or rales.   Chest:      Chest wall: No tenderness.   Abdominal:      General: There is no distension.      Tenderness: There is no abdominal tenderness.   Musculoskeletal:      Right lower leg: No edema.      Comments: +1 pitting LE edema.   Skin:     Coloration: Skin is not jaundiced or pale.      Findings: No bruising.   Neurological:      General: No focal deficit present.      Mental Status: He is alert and oriented to person, place, and time.   Psychiatric:         Mood and Affect: Mood normal.         Behavior: Behavior normal.          Primary Care Physician   Jovany Salas    Discharge Orders   - INR on 09/29/2020  - CBC and CMP, to be faxed to Dr. Sinclair at [facility] ID clinic.  - continue home Vancomycin infusion.  - To f/u with Dr. Sinclair on 10/22/2020  - to f/u w/ Dr. Pettit as an outpatient.    Significant Results and Procedures   Results for orders placed or performed during the hospital encounter of 09/18/20   XR Chest Port 1 View    Narrative    Exam: XR CHEST PORT 1 VW, 9/18/2020 10:07 PM    Indication: + blood culture r/o PNA    Comparison: 9/17/2020    Findings:   AP portable view of the chest. Implantable cardiac device and LVAD in  similar position to previous. Sternotomy wires present. Stable  cardiomegaly. Upper abdomen is unremarkable. No significant pleural  effusion. No pneumothorax. Stable mixed interstitial/airspace  opacities in the lungs. Heart size is upper limits of normal. Aortic  arch atherosclerosis.      Impression    Impression: Unchanged pulmonary edema. Underlying infection not  completely excluded. LVAD. Implantable cardiac defibrillator.  Borderline cardiomegaly. Aortic atherosclerosis.    I have personally reviewed the  examination and initial interpretation  and I agree with the findings.    PADILLA MCCORMICK MD   US Upper Extremity Venous Duplex Bilat    Narrative    EXAMINATION: DOPPLER VENOUS ULTRASOUND OF BILATERAL UPPER EXTREMITIES,  9/21/2020 4:15 PM     COMPARISON: None.    HISTORY: Transplant workup    TECHNIQUE:  Gray-scale evaluation with compression, spectral flow, and  color Doppler assessment of the deep venous system of both upper  extremities.    FINDINGS:  Normal blood flow and waveforms are demonstrated in the internal  jugular, innominate, subclavian, and axillary veins bilaterally. There  is normal compressibility of the brachial, basilic and cephalic veins  bilaterally.      Impression    IMPRESSION:    No evidence of deep venous thrombosis in either upper extremity.    I have personally reviewed the examination and initial interpretation  and I agree with the findings.    JOANNE MCMAHAN MD   US Upper Ext Arterial Duplex Bilateral    Narrative    BILATERAL UPPER EXTREMITY DUPLEX ARTERIAL ULTRASOUND 9/21/2020    CLINICAL HISTORY: Transplant workup. Patient with LVAD.    COMPARISONS: None available.    REFERRING PROVIDER: ARIC GREER    TECHNIQUE: Bilateral subclavian through axillary arteries evaluated  with grayscale, color Doppler, and Doppler waveform ultrasound.    FINDINGS: Abnormal waveforms consistent with LVAD.    RIGHT:       Subclavian artery, central: 29 cm/s, 10.6 mm       Subclavian artery, mid: 22 cm/s, 10.2 mm       Axillary artery: 19 cm/s, 8.6 mm    LEFT:       Subclavian artery, central: 49 cm/s, 7.7 mm       Subclavian artery, mid: 33 cm/s, 8.7 mm       Axillary artery: 43 cm/s, 8.3 mm      Impression    IMPRESSION: Abnormal waveforms consistent with LVAD. Patent subclavian  and axillary arteries with measurements as in the report.    DAVID MARIN MD   CT Chest Abdomen Pelvis w/o Contrast    Narrative    CT CHEST ABDOMEN PELVIS W/O CONTRAST 9/21/2020 5:57 PM    History: PLEASE EVALUATE  FOR DRIVELINE INFECTION IN THIS PATIENT WITH  BACTEREMIA    Comparison: Chest x-ray 9/18/2020, CT chest 8/5/2020    Technique: Helical CT acquisition from the lung apices to the pubic  symphysis was obtained without intravenous contrast. Axial, coronal,  and sagittal reconstructions were obtained and reviewed.    Contrast: None    Findings:     CHEST:     Lungs: Part solid nodule in the anterior left upper lobe on series 4,  image 93 measuring 6 mm with solid portion measuring approximately 3  mm. Groundglass opacity in the right upper lobe measuring 23 mm on  series 4, image 64. Atelectatic changes in the right lung base. No  pneumothorax or pleural effusion. Remainder the lungs are clear.  Airways: Central tracheobronchial tree is clear.  Vessels: Main pulmonary artery and aorta are normal in caliber. Normal  three-vessel arch. Mild arterial calcification.  Heart: Cardiac size is enlarged, predominantly left heart. No  pericardial effusion. Left-sided pacer defibrillator with leads in the  right atrium, ventricle, and coronary sinus. Postoperative changes of  HeartMate 2 left ventricular assist device. Components are in good  apposition and appear intact. There is a small amount of stranding  without significant fluid as the driveline courses through the  anterior rectus abdominous and series 2, image 114. Driveline is  intact without other areas of fluid surrounding it.  Lymph nodes: No suspicious mediastinal or hilar lymphadenopathy.  Thyroid: Within normal limits.  Esophagus: Within normal limits    ABDOMEN PELVIS:    Liver: Normal parenchymal attenuation without focal mass.  Biliary system: Layering gallstones. No adjacent inflammatory change..  No intrahepatic or extrahepatic biliary ductal dilatation.  Pancreas: No focal mass or dilation of the main pancreatic duct.  Stomach: Postoperative changes of Sylvester-en-Y gastric bypass..  Spleen: Within normal limits.  Adrenal glands: Within normal limits.  Kidneys: No  focal mass, hydronephrosis, or stone. Symmetric mild  perinephric stranding.  Bladder: Within normal limits.  Reproductive organs: Within normal limits.  Colon: Moderate fecal burden throughout the colon.  Appendix: Not visualized.  Small Bowel: Within normal limits.  Lymph nodes: No intra-abdominal or pelvic lymphadenopathy.  Vasculature: Within normal limits other than mild to moderate arterial  calcification..    Soft tissues: No suspicious soft tissue mass or fluid collection.  Bilateral gynecomastia.  Bones: No suspicious osseous lesion. Mild degenerative changes of the  lower thoracic spine.      Impression    IMPRESSION:   1. Stable small amount of stranding around the driveline as it enters  the rectus abdominis muscle. No identifiable fluid collection suggests  driveline infection. LVAD appears otherwise normal on this noncontrast  exam.   2. Subsolid nodule measuring 6 mm in the left upper lobe and a  groundglass nodule in the right upper lobe measuring 23 mm. These are  likely infectious and there short interval development since 8/5/2020.  3. Cholelithiasis.    I have personally reviewed the examination and initial interpretation  and I agree with the findings.    ILYA HOBBS MD   US Abdomen Complete    Narrative    EXAMINATION: US ABDOMEN COMPLETE, 9/22/2020 8:30 AM    COMPARISON: CT chest abdomen and pelvis 9/21/2020    HISTORY: transplant work up    TECHNIQUE: The abdomen was scanned in standard fashion with  specialized ultrasound transducer(s) using both grey scale and limited  color Doppler techniques.    FINDINGS:   Fluid: No evidence of ascites or pleural effusions.    Liver: The liver demonstrates increased echogenicity and normal  echotexture, measuring 14.6 cm in craniocaudal dimension. There is no  focal mass.     Gallbladder: Cholelithiasis, without gallbladder wall thickening or  pericholecystic fluid. The common bile duct is not visualized on this  exam.    Pancreas: Visualized portions  of the head and body of the pancreas are  unremarkable.     Spleen: The spleen measures 8.7 cm in craniocaudal dimension. No focal  masses.    Kidney: The right and left kidney lengths are 10.1 and 11.1 cm  respectively. There is no hydronephrosis or hydroureter, no shadowing  renal calculi, cystic lesion or mass.     Vessels: Abdominal aorta is not well-visualized on this exam, however  IVC is within normal limits.      Impression    IMPRESSION:   1.  Increased hepatic echogenicity is suggestive of hepatic steatosis.  2.  Cholelithiasis without additional sonographic evidence of acute  cholecystitis.    I have personally reviewed the examination and initial interpretation  and I agree with the findings.    JOANNE MCMAHAN MD   XR Chest Port 1 View    Narrative    Exam: XR CHEST PORT 1 VW, 2020 11:25 AM    Indication: PICC    Comparison: 2020    Findings:   Right upper extremity PICC tip at the low SVC. Median sternotomy  wires. Left chest wall automatic implantable cardiac defibrillator  leads are intact and in stable position. Partially visualized LVAD in  stable position. Stable enlarged cardiac silhouette and prominent  central pulmonary vasculature. No appreciable pleural effusion or  pneumothorax. No focal airspace opacity.      Impression    Impression: Right upper extremity PICC tip at the low SVC.    TERA LEGGETT DO   Echocardiogram Complete    Narrative    [id number]  [id number]  [id number]  [id number]^BRANDON^TRISH^ALENA           [facility],[address]  Echocardiography Laboratory  [address] [place] [id number]     Name: LOIS FULLER  MRN: [id number]  : 1955  Study Date: 2020 08:57 AM  Age: 65 yrs  Gender: Male  Patient Location: [facility]  Reason For Study: CHF  Ordering Physician: TRISH TAYLOR  Performed By: ALEXA Miller     BSA: 2.1 m2  Height: 68 in  Weight: 225 lb  BP: 97/85  mmHg  _____________________________________________________________________________  __        Procedure  LVAD Echocardiogram with two-dimensional, color and spectral Doppler  performed.  _____________________________________________________________________________  __        Interpretation Summary  HM2 at 9400RPM.  Severely (EF <30%) reduced left ventricular function is present. Mild left  ventricular dilation is present. LVIDd 61mm.  Septum midline.  Aortic valve remain closed with no AI. Normal inflow velocities (62 cm/s).  Outflow velocity cannot be assessed.  Global right ventricular function is mildly to moderately reduced.  Dilation of the inferior vena cava is present with abnormal respiratory  variation in diameter suggests a high RA pressure estimated at 15 mmHg or  greater.  Mild to moderate mitral insufficiency is present.     This study was compared with the study from 8/5/2020 .  IVC is dilated now with higher RA pressure. Otherwise no significant change.  _____________________________________________________________________________  __        Left Ventricle  LV eccentric hypertrophy. Mild left ventricular dilation is present. Severely  (EF <30%) reduced left ventricular function is present. Diastolic function not  assessed due to presence of LVAD. Severe diffuse hypokinesis is present.     Right Ventricle  Cannot assess RV size. Global right ventricular function is mildly to  moderately reduced. A pacemaker lead is noted in the right ventricle.     Atria  The atria cannot be assessed.        Mitral Valve  Mild to moderate mitral insufficiency is present.     Aortic Valve  Aortic valve remain closed. Mild aortic valve calcification is present. No  aortic regurgitation is present.     Tricuspid Valve  The valve leaflets are not well visualized. Trace tricuspid insufficiency is  present. The right ventricular systolic pressure is approximated at 32.5 mmHg  plus the right atrial pressure.     Pulmonic  Valve  The pulmonic valve cannot be assessed.     Vessels  The aorta root is normal. Dilation of the inferior vena cava is present with  abnormal respiratory variation in diameter. IVC diameter >2.1 cm collapsing  <50% with sniff suggests a high RA pressure estimated at 15 mmHg or greater.     Pericardium  No pericardial effusion is present.        Compared to Previous Study  This study was compared with the study from 2020 . IVC is dilated now with  higher RA pressure. Otherwise no significant change.  _____________________________________________________________________________  __     MMode/2D Measurements & Calculations  IVSd: 0.84 cm  LVIDd: 6.1 cm  LVIDs: 5.6 cm  LVPWd: 0.86 cm  FS: 7.9 %  LV mass(C)d: 206.4 grams  LV mass(C)dI: 96.1 grams/m2  RWT: 0.28        Doppler Measurements & Calculations  TR max sloane: 285.0 cm/sec  TR max P.5 mmHg     _____________________________________________________________________________  __           Report approved by: Kailee LOERA 2020 10:38 AM        *Note: Due to a large number of results and/or encounters for the requested time period, some results have not been displayed. A complete set of results can be found in Results Review.       Discharge Medications   Discharge Medication List as of 2020  5:13 PM      START taking these medications    Details   spironolactone (ALDACTONE) 25 MG tablet Take 1 tablet (25 mg) by mouth daily, Disp-30 tablet,R-3, E-Prescribe         CONTINUE these medications which have CHANGED    Details   allopurinol (ZYLOPRIM) 100 MG tablet Take 2 tablets (200 mg) by mouth daily, Disp-30 tablet,R-3, E-Prescribe      citalopram (CELEXA) 10 MG tablet Take 3 tablets (30 mg) by mouth At Bedtime, Disp-90 tablet,R-3, E-Prescribe      gabapentin (NEURONTIN) 300 MG capsule Take 2 capsules (600 mg) by mouth At Bedtime, Disp-30 capsule,R-3, E-Prescribe      predniSONE (DELTASONE) 5 MG tablet Take 2 tablets (10 mg) by mouth daily for 7  days, THEN 1 tablet (5 mg) daily for 20 days., Disp-34 tablet,R-0, E-Prescribe      vancomycin 1,750 mg Inject 1,750 mg into the vein every 24 hours, Disp-7.5 L,R-3, Local Print      warfarin ANTICOAGULANT (COUMADIN) 5 MG tablet Take 1 tablet (5 mg) by mouth daily, Disp-30 tablet,R-3, E-Prescribe         CONTINUE these medications which have NOT CHANGED    Details   acetaminophen (TYLENOL) 325 MG tablet Take 650 mg by mouth every 6 hours as needed for mild pain, Historical      albuterol (PROAIR HFA) 108 (90 Base) MCG/ACT inhaler Inhale 2 puffs into the lungs every 4 hours as needed for shortness of breath / dyspnea or wheezing , Historical      amiodarone (PACERONE) 200 MG tablet Take 400 mg by mouth daily, Historical      aspirin 81 MG chewable tablet 1 tablet (81 mg) by Oral or Feeding Tube route daily, Disp-36 tablet, OTC      blood glucose monitoring (ONE TOUCH ULTRA 2) meter device kit Use to test blood sugar four times daily or as directed.Disp-1 kit, [id number]      blood glucose VI test strip Use to test blood sugar four times daily or as directed., Disp-200 each, R-0, E-Prescribe      bumetanide (BUMEX) 0.5 MG tablet Take 2 tablets (1 mg) by mouth 2 times daily, Disp-180 tablet,R-3, Historical      cyanocobalamin (VITAMIN B12) 1000 MCG/ML injection Inject 1,000 mcg into the muscle every 30 days, Historical      fluticasone-vilanterol (BREO ELLIPTA) 200-25 MCG/INH oral inhaler Inhale 1 puff into the lungs daily, Historical      hydrALAZINE (APRESOLINE) 10 MG tablet Take 3 tablets (30 mg) by mouth 3 times daily, Disp-545 tablet,R-3, Historical      insulin glargine (LANTUS SOLOSTAR) 100 UNIT/ML pen Inject 6 Units Subcutaneous At Bedtime, Disp-9 mL,R-3, E-PrescribeIf Lantus is not covered by insurance, may substitute Basaglar at same dose and frequency.        insulin lispro (HUMALOG KWIKPEN) 100 UNIT/ML (1 unit dial) KWIKPEN Inject 1-7 Units Subcutaneous 4 times daily, Disp-3 mL,R-1, E-Prescribe       insulin pen needle (32G X 4 MM) 32G X 4 MM miscellaneous Use four pen needles daily or as directed.Disp-110 each, [id number]      montelukast (SINGULAIR) 10 MG tablet Take 10 mg by mouth At Bedtime, Historical      pantoprazole (PROTONIX) 40 MG EC tablet Take 1 tablet (40 mg) by mouth every morning, Disp-60 tablet,R-5, E-Prescribe      potassium chloride ER (KLOR-CON M) 10 MEQ CR tablet Take 2 tablets (20 mEq) by mouth daily, Disp-30 tablet,R-4, Historical      traMADol (ULTRAM) 50 MG tablet Take 2 tablets (100 mg) by mouth every 6 hours as needed for moderate pain or breakthrough pain, Disp-28 tablet, Transitional      umeclidinium (INCRUSE ELLIPTA) 62.5 MCG/INH oral inhaler Inhale 1 puff into the lungs daily, Historical      zinc sulfate (ZINCATE) 220 (50 Zn) MG capsule Take 220 mg by mouth 2 times daily, Historical         STOP taking these medications       levofloxacin (LEVAQUIN) 750 MG tablet Comments:   Reason for Stopping:             Allergies   Allergies   Allergen Reactions     Beer Shortness Of Breath     Grass Shortness Of Breath     Ace Inhibitors Cough     Dust Mites Other (See Comments)     Asthma     Mold Other (See Comments)     Asthma     Penicillins Other (See Comments)     Unknown - childhood exposure    Tolerated Zosyn 9/18-9/20/2020     Sulfa Drugs Other (See Comments) and Unknown     Unknown childhood reaction

## 2020-09-28 ENCOUNTER — ANTICOAGULATION THERAPY VISIT (OUTPATIENT)
Dept: ANTICOAGULATION | Facility: CLINIC | Age: 65
End: 2020-09-28

## 2020-09-28 ENCOUNTER — HOME INFUSION (PRE-WILLOW HOME INFUSION) (OUTPATIENT)
Dept: PHARMACY | Facility: CLINIC | Age: 65
End: 2020-09-28

## 2020-09-28 ENCOUNTER — CARE COORDINATION (OUTPATIENT)
Dept: CARDIOLOGY | Facility: CLINIC | Age: 65
End: 2020-09-28

## 2020-09-28 DIAGNOSIS — Z79.01 LONG TERM CURRENT USE OF ANTICOAGULANT THERAPY: ICD-10-CM

## 2020-09-28 DIAGNOSIS — Z95.811 LVAD (LEFT VENTRICULAR ASSIST DEVICE) PRESENT (H): ICD-10-CM

## 2020-09-28 DIAGNOSIS — I50.22 CHRONIC SYSTOLIC CONGESTIVE HEART FAILURE (H): ICD-10-CM

## 2020-09-28 LAB
BACTERIA SPEC CULT: NO GROWTH
SPECIMEN SOURCE: NORMAL

## 2020-09-28 NOTE — PROGRESS NOTES
"[facility]: Post-Discharge Note  SITUATION                                                      Admission:    Admission Date: 09/18/20   Reason for Admission: Staph hominis bacteremia  Discharge:   Discharge Date: 09/27/20  Discharge Diagnosis: Staph hominis bacteremia  Discharge Service: cardiology  Discharge Plan: fu infusion    BACKGROUND                                                      Jim Willingham is a 65 year old male with PMHx significant for NICM (EF 20%) s/p HM II LVAD placement (6/19/17) as DT due to obesity, A-fib, RV failure, CKD 3, DM2 c/b neuropathy, CECILIA, HTN, and Asthma who was admitted with bacteremia.      Patient was in usual state of health until Thursday, 9/17 when he started to have myalgias, headaches, chills, sweats, SOB and felt like a \"semi-truck rolled over him\". He was seen in ED with compelete work-up including BCx and thought to possibly have PNA and was discharged on levaquin. He was called in on 9/18/2020 due to positive blood cultures. He stated that he took 2 doses of levaquin and felt better. He however, stated that  it felt like \"a bus ran him over instead of a semi-truck\".  He otherwise continued to have headaches, but denied fevers/chills, vision changes, chest pain, SOB, abd pain, N/V, weakness of his extremities.     ASSESSMENT      Discharge Assessment  Patient reports symptoms are: Improved([facility] came to see pt yesterday for infusion. went well)  Does the patient have all of their medications?: Yes  Does patient know what their new medications are for?: Yes  Does patient have a follow-up appointment scheduled?: Yes  Does patient have any other questions or concerns?: No    Post-op  Did the patient have surgery or a procedure: No  Fever: No  Chills: No  Eating & Drinking: eating and drinking without complaints/concerns  PO Intake: regular diet  Bowel Function: normal  Urinary Status: voiding without complaint/concerns        PLAN                         "                              Outpatient Plan:      Future Appointments   Date Time Provider Department Center   9/30/2020  8:30 AM BAY 7 INFUSION [facility] [place]   10/22/2020  2:00 PM Margaret Sinclair MD [facility]   10/30/2020  8:00 AM Forest Gonzalez MD [facility]   12/18/2020 10:00 AM  LAB [facility]   12/18/2020 10:30 AM  CV DEVICE 1 [facility]   12/18/2020 11:00 AM Delisa Montgomery MD [facility]   3/18/2021 12:00 AM  ICD REMOTE [facility]CCVSV [facility]           Angelina Kaminski

## 2020-09-28 NOTE — PROGRESS NOTES
This is a recent snapshot of the patient's [facility] medical record.  For current drug dose and complete information and questions, call [phone] or In Basket pool, fv home infusion ([id number])  CSN Number:  [id number]

## 2020-09-28 NOTE — PLAN OF CARE
Occupational Therapy Discharge Summary    Reason for therapy discharge:    Discharged to home.    Progress towards therapy goal(s). See goals on Care Plan in [facility] electronic health record for goal details.  Goals partially met.  Barriers to achieving goals:   discharge from facility.    Therapy recommendation(s):    No further therapy is recommended.

## 2020-09-28 NOTE — PROGRESS NOTES
ANTICOAGULATION  MANAGEMENT: Discharge Review    Jim Willingham chart reviewed for anticoagulation continuity of care    Hospital Admission on 9/18-9/27 for bacteremia.  Pt had myalgias, headache, chills sweats and SOB.    Discharge disposition: Home    Results:    Recent labs: (last 7 days)     09/22/20  0442 09/23/20  0518 09/23/20  2149 09/24/20  0543 09/24/20  1242 09/24/20  1911 09/24/20  2042 09/25/20  0510 09/26/20  0515 09/27/20  0452   INR 2.33* 1.76*  --  1.63*  --   --   --  1.62* 1.63* 1.94*   AXA  --   --  0.29 0.37 0.42 1.59* 0.23 0.16 0.20 0.16     Anticoagulation inpatient management:     see calendar    Anticoagulation discharge instructions:     Warfarin dosing: Next INR 9/29   Bridging: No   INR goal change: No      Medication changes affecting anticoagulation: Yes: Started Aldactone, dose change on allopurinol celexa, neurontin, prednisone and vancomycin.  Discontinued Levofloxacin.    Additional factors affecting anticoagulation: No    Plan     Agree with dosing adjustment on discharge    Patient not contacted    Anticoagulation Calendar updated    Delisa Hillman RN

## 2020-09-28 NOTE — PROGRESS NOTES
Called pt to check in 24hr after discharge from hospital. Pt reports feeling well. Home health will be at his home at 12pm today for IV abx. Sent follow-up appt requests to . Pt asked for phone number for primary care clinic as he is considering switching PCP's to [facility]. Pt has no questions or concerns at this time.

## 2020-09-29 ENCOUNTER — CARE COORDINATION (OUTPATIENT)
Dept: CARDIOLOGY | Facility: CLINIC | Age: 65
End: 2020-09-29

## 2020-09-29 ENCOUNTER — HOME INFUSION (PRE-WILLOW HOME INFUSION) (OUTPATIENT)
Dept: PHARMACY | Facility: CLINIC | Age: 65
End: 2020-09-29

## 2020-09-29 ENCOUNTER — ANTICOAGULATION THERAPY VISIT (OUTPATIENT)
Dept: ANTICOAGULATION | Facility: CLINIC | Age: 65
End: 2020-09-29

## 2020-09-29 DIAGNOSIS — Z79.01 LONG TERM CURRENT USE OF ANTICOAGULANT THERAPY: ICD-10-CM

## 2020-09-29 DIAGNOSIS — I50.22 CHRONIC SYSTOLIC CONGESTIVE HEART FAILURE (H): Primary | ICD-10-CM

## 2020-09-29 DIAGNOSIS — I50.22 CHRONIC SYSTOLIC CONGESTIVE HEART FAILURE (H): ICD-10-CM

## 2020-09-29 DIAGNOSIS — Z95.811 LVAD (LEFT VENTRICULAR ASSIST DEVICE) PRESENT (H): ICD-10-CM

## 2020-09-29 LAB
ALBUMIN SERPL-MCNC: 3.9 G/DL (ref 3.4–5)
ALP SERPL-CCNC: 91 U/L (ref 40–150)
ALT SERPL W P-5'-P-CCNC: 65 U/L (ref 0–70)
ANION GAP SERPL CALCULATED.3IONS-SCNC: 8 MMOL/L (ref 3–14)
AST SERPL W P-5'-P-CCNC: 66 U/L (ref 0–45)
BACTERIA SPEC CULT: NO GROWTH
BILIRUB SERPL-MCNC: 0.7 MG/DL (ref 0.2–1.3)
BUN SERPL-MCNC: 42 MG/DL (ref 7–30)
CALCIUM SERPL-MCNC: 8.4 MG/DL (ref 8.5–10.1)
CHLORIDE SERPL-SCNC: 102 MMOL/L (ref 94–109)
CO2 SERPL-SCNC: 25 MMOL/L (ref 20–32)
CREAT SERPL-MCNC: 2.09 MG/DL (ref 0.66–1.25)
ERYTHROCYTE [DISTWIDTH] IN BLOOD BY AUTOMATED COUNT: 18.1 % (ref 10–15)
GFR SERPL CREATININE-BSD FRML MDRD: 33 ML/MIN/{1.73_M2}
GLUCOSE SERPL-MCNC: 143 MG/DL (ref 70–99)
HCT VFR BLD AUTO: 36.5 % (ref 40–53)
HGB BLD-MCNC: 10.8 G/DL (ref 13.3–17.7)
INR PPP: 2.34 (ref 0.86–1.14)
LDH SERPL L TO P-CCNC: 369 U/L (ref 85–227)
MCH RBC QN AUTO: 26.7 PG (ref 26.5–33)
MCHC RBC AUTO-ENTMCNC: 29.6 G/DL (ref 31.5–36.5)
MCV RBC AUTO: 90 FL (ref 78–100)
PLATELET # BLD AUTO: 203 10E9/L (ref 150–450)
POTASSIUM SERPL-SCNC: 4.4 MMOL/L (ref 3.4–5.3)
PROT SERPL-MCNC: 7 G/DL (ref 6.8–8.8)
RBC # BLD AUTO: 4.04 10E12/L (ref 4.4–5.9)
SODIUM SERPL-SCNC: 135 MMOL/L (ref 133–144)
SPECIMEN SOURCE: NORMAL
WBC # BLD AUTO: 7.3 10E9/L (ref 4–11)

## 2020-09-29 PROCEDURE — 80053 COMPREHEN METABOLIC PANEL: CPT | Performed by: PHYSICIAN ASSISTANT

## 2020-09-29 PROCEDURE — 85027 COMPLETE CBC AUTOMATED: CPT | Performed by: PHYSICIAN ASSISTANT

## 2020-09-29 PROCEDURE — 36415 COLL VENOUS BLD VENIPUNCTURE: CPT | Performed by: PHYSICIAN ASSISTANT

## 2020-09-29 PROCEDURE — 85610 PROTHROMBIN TIME: CPT | Performed by: PHYSICIAN ASSISTANT

## 2020-09-29 PROCEDURE — 83615 LACTATE (LD) (LDH) ENZYME: CPT | Performed by: PHYSICIAN ASSISTANT

## 2020-09-29 NOTE — PROGRESS NOTES
Pt had labs today as follow-up from admission. Called pt to review and discuss any symptoms. Pt reports weight is 219lb. He denies abdominal distention or LE edema. No shortness of breath.   Discussed lab results and pt report with ROGER Rivera. Plan to stop aldactone, and recheck labs in 1 week. Pt has appt in clinic on 10/2 and labs will be checked at that time. Also reviewed LDH, which is in range of pt's baseline over the last several months.   Called pt back to review plan. Pt verbalized understanding.

## 2020-09-29 NOTE — Clinical Note
Update from [facility].  Adjusted Warfarin dose on 9/29.  Recommended patient retest on Friday 10/2.  INR is therapeutic. Patient recent hospitalization + medication changes reviewed (rationale for return to lab).  9/29 Creatinine and LDH reviewed.  TN, ACC RN

## 2020-09-29 NOTE — PROGRESS NOTES
ANTICOAGULATION FOLLOW-UP CLINIC VISIT    Patient Name:  Jim Willingham  Date:  2020  Contact Type:  Telephone    SUBJECTIVE:  Patient Findings     Positives:   Change in medications    Comments:   Spoke to Jim.  Reviewed other lab results, updated VAD pool with Cr. & LDH. Discussed with Pharmacist Jae Bedolla [facility] for patient's new Anticoagulation recommendation. Rationale behind retesting at the end of this week is that Jim is on new medications and was recently discharged from the hospital.        Clinical Outcomes     Comments:   Spoke to Jim.  Reviewed other lab results, updated VAD pool with Cr. & LDH. Discussed with Pharmacist Jae Bedolla [facility] for patient's new Anticoagulation recommendation. Rationale behind retesting at the end of this week is that Jim is on new medications and was recently discharged from the hospital.           OBJECTIVE    Recent labs: (last 7 days)     20  1131   INR 2.34*       ASSESSMENT / PLAN  INR assessment THER    Recheck INR In: 3 DAYS    INR Location Clinic      Anticoagulation Summary  As of 2020    INR goal:   2.0-3.0   TTR:   50.9 % (10.9 mo)   INR used for dosin.34 (2020)   Warfarin maintenance plan:   7.5 mg (5 mg x 1.5) every Sun, Thu; 5 mg (5 mg x 1) all other days   Full warfarin instructions:   : 7.5 mg; 10/: 5 mg; Otherwise 7.5 mg every Sun, Thu; 5 mg all other days   Weekly warfarin total:   40 mg   Plan last modified:   Delisa Hillman RN (9/15/2020)   Next INR check:   10/2/2020   Priority:   Critical   Target end date:   Indefinite    Indications    LVAD (left ventricular assist device) present (H) [Z95.811]  Long-term (current) use of anticoagulants [Z79.01] [Z79.01]  Chronic systolic congestive heart failure (H) [I50.22]             Anticoagulation Episode Summary     INR check location:       Preferred lab:       Send INR reminders to:   [facility]    Comments:   +++Patient drawn at Home care visit Q Mon.  (8/10/20)+++  Labs drawn either at [facility] or [facility], See 3/5/20 ADDENDUM, pt. weekly INR >2.0 for Cardioversion  LVAD placed 6/19/17   II  ASA 81 mg daily      Anticoagulation Care Providers     Provider Role Specialty Phone number    Delisa Montgomery MD [place] Referring Cardiology [phone]            See the Encounter Report to view Anticoagulation Flowsheet and Dosing Calendar (Go to Encounters tab in chart review, and find the Anticoagulation Therapy Visit)    See patient findings.  INR/CFX/F2 RESULT:INR result is 2.34    ASSESSMENT:Spoke with patient. Gave them their lab results and new warfarin recommendation.  No changes in health, or diet. No missed doses, no falls. No signs or symptoms of bleed or clotting.  Changes to medications.  Elevated Cr. & LDH on 9/29.    DOSING ADJUSTMENT:7.5mg today, 5mg tomorrow and Thurs.      NEXT INR/FACTOR X OR FACTOR II:10/2    PROTOCOL FOLLOWED:LVAD goal 2-3  Patient had LVAD placed on:   6/19/17  Type of LVAD: HM 2  Patient's current Aspirin dose: 81mg  LVAD Protocol followed:  Yes   If Not Followed Explanation:  Brendan Garcia RN

## 2020-09-29 NOTE — PROGRESS NOTES
This is a recent snapshot of the patient's [facility] medical record.  For current drug dose and complete information and questions, call [phone] or In Basket pool, fv home infusion ([id number])  CSN Number:  [id number]

## 2020-09-30 ENCOUNTER — HOME INFUSION (PRE-WILLOW HOME INFUSION) (OUTPATIENT)
Dept: PHARMACY | Facility: CLINIC | Age: 65
End: 2020-09-30

## 2020-09-30 LAB
ALBUMIN SERPL-MCNC: 4 G/DL
ALP SERPL-CCNC: 179 U/L
ALT SERPL-CCNC: 76 U/L
ANION GAP SERPL CALCULATED.3IONS-SCNC: 11 MMOL/L
AST SERPL-CCNC: 87 U/L
BASOPHILS - ABS (DIFF) - HISTORICAL: 0.08 K/UL
BASOPHILS NFR BLD AUTO: 0.9 %
BILIRUB SERPL-MCNC: 0.8 MG/DL
BUN SERPL-MCNC: 38 MG/DL
CALCIUM SERPL-MCNC: 8.5 MG/DL
CHLORIDE SERPLBLD-SCNC: 101 MMOL/L
CO2 SERPL-SCNC: 22 MMOL/L
CREAT SERPL-MCNC: 2.21 MG/DL
CRP SERPL-MCNC: 0.6 MG/L
EOSINOPHIL COUNT (ABSOLUTE): 0.09 K/UL
EOSINOPHIL NFR BLD AUTO: 1 %
ERYTHROCYTE [DISTWIDTH] IN BLOOD BY AUTOMATED COUNT: 18.3 %
ERYTHROCYTE [SEDIMENTATION RATE] IN BLOOD: 9 MM/HR
GFR SERPL CREATININE-BSD FRML MDRD: 31 ML/MIN/1.73M2
GLUCOSE SERPL-MCNC: 193 MG/DL (ref 70–99)
HCT VFR BLD AUTO: 36.3 %
HEMOGLOBIN: 10.7 G/DL
IMMATURE GRANS (ABS): 0.05 K/UL
IMMATURE GRANULOCYTES: 0.6 %
LYMPHOCYTES # BLD AUTO: 0.94 K/UL
LYMPHOCYTES NFR BLD AUTO: 10.4 %
MCH RBC QN AUTO: 27 PG
MCHC RBC AUTO-ENTMCNC: 30 G/DL
MCV RBC AUTO: 92 FL
MONOCYTES # BLD AUTO: 0.7 K/UL
MONOCYTES NFR BLD AUTO: 7.8 %
NEUTROPHILS # BLD AUTO: 7.17 K/UL
NEUTROPHILS NFR BLD AUTO: 79.3 %
NUCLEATED RBC/100 WBC: 0
NUCLEATED RBCS: 0
PLATELET # BLD AUTO: 216 10^9/L
PMV BLD: 11.2 FL
POTASSIUM SERPL-SCNC: 5.2 MMOL/L
PROT SERPL-MCNC: 7.2 G/DL
RBC # BLD AUTO: 3.95 10^12/L
SODIUM SERPL-SCNC: 134 MMOL/L
VANCOMYCIN: 21.8
WBC # BLD AUTO: 9 10^9/L

## 2020-09-30 NOTE — PROGRESS NOTES
This is a recent snapshot of the patient's [facility] medical record.  For current drug dose and complete information and questions, call [phone] or In Basket pool, fv home infusion ([id number])  CSN Number:  [id number]

## 2020-10-01 DIAGNOSIS — Z95.811 LVAD (LEFT VENTRICULAR ASSIST DEVICE) PRESENT (H): ICD-10-CM

## 2020-10-01 DIAGNOSIS — I50.22 CHRONIC SYSTOLIC CONGESTIVE HEART FAILURE (H): Primary | ICD-10-CM

## 2020-10-02 ENCOUNTER — TELEPHONE (OUTPATIENT)
Dept: CARDIOLOGY | Facility: CLINIC | Age: 65
End: 2020-10-02

## 2020-10-02 ENCOUNTER — HOME INFUSION (PRE-WILLOW HOME INFUSION) (OUTPATIENT)
Dept: PHARMACY | Facility: CLINIC | Age: 65
End: 2020-10-02

## 2020-10-02 ENCOUNTER — ANTICOAGULATION THERAPY VISIT (OUTPATIENT)
Dept: ANTICOAGULATION | Facility: CLINIC | Age: 65
End: 2020-10-02

## 2020-10-02 ENCOUNTER — OFFICE VISIT (OUTPATIENT)
Dept: CARDIOLOGY | Facility: CLINIC | Age: 65
End: 2020-10-02
Attending: INTERNAL MEDICINE
Payer: COMMERCIAL

## 2020-10-02 VITALS — BODY MASS INDEX: 33.75 KG/M2 | OXYGEN SATURATION: 98 % | SYSTOLIC BLOOD PRESSURE: 84 MMHG | WEIGHT: 222 LBS

## 2020-10-02 DIAGNOSIS — I50.22 CHRONIC SYSTOLIC CONGESTIVE HEART FAILURE (H): ICD-10-CM

## 2020-10-02 DIAGNOSIS — Z95.811 LVAD (LEFT VENTRICULAR ASSIST DEVICE) PRESENT (H): Primary | ICD-10-CM

## 2020-10-02 DIAGNOSIS — Z95.811 LVAD (LEFT VENTRICULAR ASSIST DEVICE) PRESENT (H): ICD-10-CM

## 2020-10-02 DIAGNOSIS — Z79.01 LONG TERM CURRENT USE OF ANTICOAGULANT THERAPY: ICD-10-CM

## 2020-10-02 LAB
ALBUMIN SERPL-MCNC: 3.9 G/DL (ref 3.4–5)
ALP SERPL-CCNC: 128 U/L (ref 40–150)
ALT SERPL W P-5'-P-CCNC: 62 U/L (ref 0–70)
AMPHETAMINES UR QL SCN: NEGATIVE
ANION GAP SERPL CALCULATED.3IONS-SCNC: 9 MMOL/L (ref 3–14)
AST SERPL W P-5'-P-CCNC: 46 U/L (ref 0–45)
BARBITURATES UR QL: NEGATIVE
BENZODIAZ UR QL: NEGATIVE
BILIRUB SERPL-MCNC: 0.9 MG/DL (ref 0.2–1.3)
BUN SERPL-MCNC: 31 MG/DL (ref 7–30)
CALCIUM SERPL-MCNC: 8.4 MG/DL (ref 8.5–10.1)
CANNABINOIDS UR QL SCN: NEGATIVE
CHLORIDE SERPL-SCNC: 103 MMOL/L (ref 94–109)
CO2 SERPL-SCNC: 23 MMOL/L (ref 20–32)
COCAINE UR QL: NEGATIVE
CREAT SERPL-MCNC: 1.63 MG/DL (ref 0.66–1.25)
ERYTHROCYTE [DISTWIDTH] IN BLOOD BY AUTOMATED COUNT: 18.5 % (ref 10–15)
ETHANOL UR QL SCN: NEGATIVE
GFR SERPL CREATININE-BSD FRML MDRD: 44 ML/MIN/{1.73_M2}
GLUCOSE SERPL-MCNC: 183 MG/DL (ref 70–99)
HCT VFR BLD AUTO: 35.4 % (ref 40–53)
HGB BLD-MCNC: 10.3 G/DL (ref 13.3–17.7)
INR PPP: 3.37 (ref 0.86–1.14)
LDH SERPL L TO P-CCNC: 380 U/L (ref 85–227)
MCH RBC QN AUTO: 27.4 PG (ref 26.5–33)
MCHC RBC AUTO-ENTMCNC: 29.1 G/DL (ref 31.5–36.5)
MCV RBC AUTO: 94 FL (ref 78–100)
OPIATES UR QL SCN: NEGATIVE
PCP UR QL SCN: NEGATIVE
PLATELET # BLD AUTO: 191 10E9/L (ref 150–450)
POTASSIUM SERPL-SCNC: 4.7 MMOL/L (ref 3.4–5.3)
PROT SERPL-MCNC: 7 G/DL (ref 6.8–8.8)
RBC # BLD AUTO: 3.76 10E12/L (ref 4.4–5.9)
SODIUM SERPL-SCNC: 135 MMOL/L (ref 133–144)
WBC # BLD AUTO: 6.7 10E9/L (ref 4–11)

## 2020-10-02 PROCEDURE — 85027 COMPLETE CBC AUTOMATED: CPT | Performed by: PATHOLOGY

## 2020-10-02 PROCEDURE — 80320 DRUG SCREEN QUANTALCOHOLS: CPT | Mod: 90 | Performed by: PATHOLOGY

## 2020-10-02 PROCEDURE — 80053 COMPREHEN METABOLIC PANEL: CPT | Performed by: PATHOLOGY

## 2020-10-02 PROCEDURE — 83615 LACTATE (LD) (LDH) ENZYME: CPT | Performed by: PATHOLOGY

## 2020-10-02 PROCEDURE — 99214 OFFICE O/P EST MOD 30 MIN: CPT | Performed by: INTERNAL MEDICINE

## 2020-10-02 PROCEDURE — 99000 SPECIMEN HANDLING OFFICE-LAB: CPT | Performed by: PATHOLOGY

## 2020-10-02 PROCEDURE — 93750 INTERROGATION VAD IN PERSON: CPT | Performed by: INTERNAL MEDICINE

## 2020-10-02 PROCEDURE — 85610 PROTHROMBIN TIME: CPT | Performed by: PATHOLOGY

## 2020-10-02 PROCEDURE — G0463 HOSPITAL OUTPT CLINIC VISIT: HCPCS | Mod: 25

## 2020-10-02 PROCEDURE — 36415 COLL VENOUS BLD VENIPUNCTURE: CPT | Performed by: PATHOLOGY

## 2020-10-02 PROCEDURE — 80307 DRUG TEST PRSMV CHEM ANLYZR: CPT | Mod: 90 | Performed by: PATHOLOGY

## 2020-10-02 ASSESSMENT — PAIN SCALES - GENERAL: PAINLEVEL: NO PAIN (0)

## 2020-10-02 NOTE — PROGRESS NOTES
This is a recent snapshot of the patient's [facility] medical record.  For current drug dose and complete information and questions, call [phone] or In [facility] ([id number])  CSN Number:  [id number]  [facility]      ROS      Physical Exam

## 2020-10-02 NOTE — NURSING NOTE
All 4 sets of pt's batteries were . He was given new batteries (SN: [id number], 070, 071, and 072, &[id number], 282, 283, and 284).

## 2020-10-02 NOTE — TELEPHONE ENCOUNTER
Jim Cuevas Dr. is seeing you in clinic today.  I can see ROGER Yeboah commended on his lab results from 9/29 that patient should have adjusted his Bumex dosing from 1mg BID to holding afternoon dose x2 and then 1mg in the AM and 0.5 mg in the afternoon thereafter due to his creatinine trending upward.    I spoke to Jim today.  He said this message hasn't been communicated to him, he has continued with the 1mg BID dosing.     Patient had labs drawn by home care on Wednesday 9/30 and his creatinine was 2.21 (results in [facility], I will fax a copy to the office).      I have adjusted his vancomycin dose due to the the creatinine trend, we are checking labs at home again Tuesday next week.     Thank you!  Angelina Angeles, PharmD [facility]   Ph: [phone]  Fax: [phone]

## 2020-10-02 NOTE — PROGRESS NOTES
ANTICOAGULATION FOLLOW-UP CLINIC VISIT    Patient Name:  Jim Willingham  Date:  10/2/2020  Contact Type:  Telephone    SUBJECTIVE:  Patient Findings     Comments:  Left message for patient.        Clinical Outcomes     Comments:  Left message for patient.           OBJECTIVE    Recent labs: (last 7 days)     10/02/20  1500   INR 3.37*       ASSESSMENT / PLAN  INR assessment SUPRA    Recheck INR In: 3 DAYS    INR Location Clinic      Anticoagulation Summary  As of 10/2/2020    INR goal:  2.0-3.0   TTR:  51.5 % (10.9 mo)   INR used for dosing:  3.37 (10/2/2020)   Warfarin maintenance plan:  7.5 mg (5 mg x 1.5) every Sun, Thu; 5 mg (5 mg x 1) all other days   Full warfarin instructions:  10/2: 2.5 mg; 10/4: 5 mg; Otherwise 7.5 mg every Sun, Thu; 5 mg all other days   Weekly warfarin total:  40 mg   Plan last modified:  Delisa Hillman RN (9/15/2020)   Next INR check:  10/5/2020   Priority:  Critical   Target end date:  Indefinite    Indications    LVAD (left ventricular assist device) present (H) [Z95.811]  Long-term (current) use of anticoagulants [Z79.01] [Z79.01]  Chronic systolic congestive heart failure (H) [I50.22]             Anticoagulation Episode Summary     INR check location:      Preferred lab:      Send INR reminders to:  [facility]    Comments:  +++Patient drawn at Home care visit Q Mon. (8/10/20)+++  Labs drawn either at [facility] or [facility], See 3/5/20 ADDENDUM, pt. weekly INR >2.0 for Cardioversion  LVAD placed 6/19/17   II  ASA 81 mg daily      Anticoagulation Care Providers     Provider Role Specialty Phone number    Delisa Montgomery MD Referring Cardiology [phone]            See the Encounter Report to view Anticoagulation Flowsheet and Dosing Calendar (Go to Encounters tab in chart review, and find the Anticoagulation Therapy Visit)    INR/CFX/F2 RESULT: INR result is 3.37    ASSESSMENT:Left message for patient with results and dosing recommendations. Asked  patient to call back to report any missed doses, falls, signs and symptoms of bleeding or clotting, any changes in health, medication, or diet. Asked patient to call back with any questions or concerns.    DOSING ADJUSTMENT: 2.5mg today, 5mg Sat & Sun    NEXT INR/FACTOR X OR FACTOR II:10/5    PROTOCOL FOLLOWED:VAD 2-3  Patient had LVAD placed on:   6/19/17  Type of LVAD: HM 2  Patient's current Aspirin dose: 81mg  LVAD Protocol followed:  Yes.   If Not Followed Explanation:  Brendan Garcia, RN

## 2020-10-02 NOTE — PROGRESS NOTES
October 2, 2020    HPI:   Jim Willingham is a 63 year old male with a past medical history of NICM (EF 20%) s/p HM II LVAD placement (6/19/17) at DT (obesity) - clinic visit is for LVAD follow up given recent hospitalization for staph hominis bacteremia.  He is presently on vancomycin and will have a total course of 6 weeks.     He was feeling poorly prior to this last admission but has been feeling better now.     Of note he has lost a substantial amount of weight and this was intentional-as he wants to be considered for transplant.  His transplant evaluation is ongoing.     Other things have been going on in the last 6 months include atrial fibrillation starting earlier this year.  He did see electrophysiology as the pacemaker settings were changed and there was some discussion about whether or not he should have a cardioversion or just leave him in atrial fibrillation.     He was admitted with pneumonia at the end of February.     Right now he feels that he can walk several blocks before stopping.  He denies dizziness/lightheadedness.  His diuretics were recently decreased and he does feel bloating.     He denies overt stroke symptoms.  He feels as though he does not have increased lower extremity edema or increased abdominal girth.    He denies any chest pain, edema,  orthopnea, PND. He denies fevers, chills, driveline redness, tenderness, or discharge. No LVAD alarms or blood in the urine or stools.      PAST MEDICAL HISTORY:  Past Medical History:   Diagnosis Date     Benign essential hypertension 5/11/2017     Cardiomyopathy, unspecified (H) 5/8/2017     CKD (chronic kidney disease) stage 3, GFR 30-59 ml/min 5/11/2017     Depression 5/11/2017     Diabetes mellitus (H) 5/11/2017     H/O gastric bypass 5/11/2017     ICD (implantable cardioverter-defibrillator), biventricular, in situ 5/11/2017     LVAD (left ventricular assist device) present (H)      NICM (nonischemic cardiomyopathy) (H)/ EF 20% 5/11/2017     ECHO: LVEDd. 7.66 cm, Restrictive pattern , Severe mitral valve regurgitation     CECILIA (obstructive sleep apnea) 5/11/2017     Paroxysmal atrial fibrillation (H) 5/11/2017     Paroxysmal VT (H) 5/11/2017     Uncomplicated asthma      Vitamin B12 deficiency (non anemic) 5/11/2017       FAMILY HISTORY:  Family History   Problem Relation Age of Onset     Cerebrovascular Disease Mother      Diabetes Mother      Hypertension Mother      Coronary Artery Disease Father      Diabetes Type 2  Father      SOCIAL HISTORY:  No changes     CURRENT MEDICATIONS:  Current Outpatient Medications   Medication Sig Dispense Refill     acetaminophen (TYLENOL) 325 MG tablet Take 650 mg by mouth every 6 hours as needed for mild pain       albuterol (PROAIR HFA) 108 (90 Base) MCG/ACT inhaler Inhale 2 puffs into the lungs every 4 hours as needed for shortness of breath / dyspnea or wheezing        allopurinol (ZYLOPRIM) 100 MG tablet Take 2 tablets (200 mg) by mouth daily 30 tablet 3     amiodarone (PACERONE) 200 MG tablet Take 400 mg by mouth daily       aspirin 81 MG chewable tablet 1 tablet (81 mg) by Oral or Feeding Tube route daily 36 tablet      blood glucose monitoring (ONE TOUCH ULTRA 2) meter device kit Use to test blood sugar four times daily or as directed. 1 kit 0     blood glucose VI test strip Use to test blood sugar four times daily or as directed. 200 each 0     bumetanide (BUMEX) 0.5 MG tablet Take 2 tablets (1 mg) by mouth 2 times daily 180 tablet 3     citalopram (CELEXA) 10 MG tablet Take 3 tablets (30 mg) by mouth At Bedtime 90 tablet 3     cyanocobalamin (VITAMIN B12) 1000 MCG/ML injection Inject 1,000 mcg into the muscle every 30 days       fluticasone-vilanterol (BREO ELLIPTA) 200-25 MCG/INH oral inhaler Inhale 1 puff into the lungs daily       gabapentin (NEURONTIN) 300 MG capsule Take 2 capsules (600 mg) by mouth At Bedtime 30 capsule 3     hydrALAZINE (APRESOLINE) 10 MG tablet Take 3 tablets (30 mg) by mouth 3  times daily 545 tablet 3     insulin glargine (LANTUS SOLOSTAR) 100 UNIT/ML pen Inject 6 Units Subcutaneous At Bedtime 9 mL 3     insulin lispro (HUMALOG KWIKPEN) 100 UNIT/ML (1 unit dial) KWIKPEN Inject 1-7 Units Subcutaneous 4 times daily 3 mL 1     insulin pen needle (32G X 4 MM) 32G X 4 MM miscellaneous Use four pen needles daily or as directed. 110 each 0     montelukast (SINGULAIR) 10 MG tablet Take 10 mg by mouth At Bedtime       pantoprazole (PROTONIX) 40 MG EC tablet Take 1 tablet (40 mg) by mouth every morning 60 tablet 5     potassium chloride ER (KLOR-CON M) 10 MEQ CR tablet Take 2 tablets (20 mEq) by mouth daily 30 tablet 4     predniSONE (DELTASONE) 5 MG tablet Take 2 tablets (10 mg) by mouth daily for 7 days, THEN 1 tablet (5 mg) daily for 20 days. 34 tablet 0     traMADol (ULTRAM) 50 MG tablet Take 2 tablets (100 mg) by mouth every 6 hours as needed for moderate pain or breakthrough pain 28 tablet      umeclidinium (INCRUSE ELLIPTA) 62.5 MCG/INH oral inhaler Inhale 1 puff into the lungs daily       vancomycin 1,750 mg Inject 1,750 mg into the vein every 24 hours 7.5 L 3     warfarin ANTICOAGULANT (COUMADIN) 5 MG tablet Take 1 tablet (5 mg) by mouth daily 30 tablet 3     zinc sulfate (ZINCATE) 220 (50 Zn) MG capsule Take 220 mg by mouth 2 times daily       Review of systems: 12 point review of systems negative unless noted in the HPI-     EXAM:  BP (!) 84/0 (BP Location: Left arm, Patient Position: Chair, Cuff Size: Adult Large)   Wt 100.7 kg (222 lb)   SpO2 98%   BMI 33.75 kg/m    General: appears comfortable, alert and articulate, in a wheelchair , not on oxygen   Head: normocephalic, atraumatic  Eyes: anicteric sclera, EOMI  Neck: no adenopathy  Orophyarynx: moist mucosa, no lesions, dentition intact  Heart:LVAD hum, PMI diffuse   Lungs: clear, no rales or wheezing  Abdomen: soft, non-tender, bowel sounds present, no hepatosplenomegaly  Extremities: no clubbing, cyanosis or edema  Neurological:  normal speech and affect, no gross motor deficits       Interpretation Summary  HM2 at 9400RPM.  Severely (EF <30%) reduced left ventricular function is present. Mild left  ventricular dilation is present. LVIDd 61mm.  Septum midline.  Aortic valve remain closed with no AI. Normal inflow velocities (62 cm/s).  Outflow velocity cannot be assessed.  Global right ventricular function is mildly to moderately reduced.  Dilation of the inferior vena cava is present with abnormal respiratory  variation in diameter suggests a high RA pressure estimated at 15 mmHg or  greater.  Mild to moderate mitral insufficiency is present.     This study was compared with the study from 8/5/2020 .  IVC is dilated now with higher RA pressure. Otherwise no significant change      RA Pressures  3:41 PM   6    7    7      107      RV Pressures  3:42 PM 24        6     153      PA Pressures  3:43 PM 23    11    15        156      PCW Pressures  3:42 PM   4    4    5      153      Blood Flow Results Phase: Baseline     Time Results  Indexed Values (L/min/m2)   QP  3:32 PM 4.05 L/min    1.84      QS  3:32 PM 4.05 L/min    1.84      Blood Oximetry Phase: Baseline     Time Hb  SAT(%)  PO2  Content (mL/dL) PA Sat (%)   PA  3:32 PM  58.3 %      58.3      Art  3:32 PM  99 %     17.64               PA due to reports of continued mild/moderate SOB with mild pitting edema of BLE. Patient has a [employer] Biventricular ICD programmed AAIR. Normal ICD function. No episodes/arrhythmias recorded. Presenting EGM = SR @ 85 bpm. AP = 0.5%.  = 0%. OptiVol fluid index is above the threshold >200 since mid August. Estimated battery longevity to MARVA = 10 months. No short v-v intervals recorded. Lead trends appear stable. Patient notified of interrogation results by voicemail.    Assessment and Plan:    In summary Jim Willingham has a history of NICM (EF 20%) s/p HM II LVAD as DT (initially DT due to obesity).  Overall he has been doing well.   His hemodynamics  over the summer were reassuring that his wedge pressure is normal and his index is preserved.    In the larger picture, the biggest barriers to cardiac transplant include his lung function and need for conditioning. He also just had a mariajuana test positive but this has never been an issue in the past and he may have had second hand exposure- will repeat and go from there     I would like to bring his case to the larger group for discussion   I want him to work on exercise, he has done a great job on his weight too     Chronic systolic heart failure secondary to NICM.    Stage D    NYHA Class III  ACEi/ARB: yes  BB: on Toprol XL YES   Aldosterone antagonist: Aldactone to 25 mg daily.   SCD prophylaxis: CRT-D  Fluid status: slightly volume up - allowing to increase diuretics as per AVS- will obtain labs 2 weeks      MAP controlled on current regimen.  Anticoagulation: Warfarin INR goal 2-3.   Antiplatelet: restart ASA dose 81 mg daily.  LDH: Stable     Staph hominis bacteremia: cleared and still actively being treated on vancomycin- will follow up with ID     CKD Stage III:Overall stable     Paroxysmal Atrial Fibrillation- in sinus rhythm presently - see VT plan above, on AC     History of ventricular tachycardia with appropriate therapy-we will not change medical therapy for now.     paroxysmal AF: in sinus presently     Marajuana positive: will repeat     COPD: per pulmonary     RTC 3 months with NP 6 with me - sooner as needed            Delisa Montgomery MD   of Medicine   [facility] Division of Cardiology

## 2020-10-02 NOTE — NURSING NOTE
1). PUMP DATA  Primary controller serial number: [id number]     II:   Flow: 7 L/min,    Speed: 9400 RPMs,     PI: 4.7 ,  Power: 6.8 Manuel,      Primary controller   Back up battery: Patient use: 45, Replace in: 17  Months     Data downloaded: No   Equipment and driveline assessed for damage: Yes     Back up : Serial number: [id number]  Back up battery: Patient use: 4 Replace in: 20  Months  Programmed settings identical to the settings on the primary controller :Yes      Education complete: Yes   Charge the BACKUP controller s backup battery every 6 months  Perform a self test on BACKUP every 6 months  Change the MPU s batteries every 6 months:Yes  Have equipment serviced yearly (if applicable):Yes    2). ALARMS  Alarms reported by patient since last pump evaluation: No  Alarms or other finding noted during pump data history and alarm download: Pt has occasional PI events. PIs range from 2s-6s. He has several low voltage advisories, which occur in the late afternoon. He states his batteries are not lasting as long as they used to. They are over 3yrs old.     Action Taken:  Reviewed data with patient: Yes      3). DRESSING CHANGE / DRIVELINE ASSESSMENT  Dressing change completed today: No  Appearance of Driveline site: Per pt, site is C/D/I, no redness, swelling, or tenderness.     Driveline stabilization: Method: [employer]  [ Teaching reinforced on need for stabilization of Driveline. ]

## 2020-10-02 NOTE — PATIENT INSTRUCTIONS
Medications:  1. Please increase your Bumex to 2mg in the morning and 1mg in the afternoon. Take this until your weight is around 219lbs again. If you get dizzy, call the VAD Coordnator.     Follow-up:  1. You are scheduled to see Dr. Montgomery in December.   2. Gilbert will get you some information on a new primary care provider.     Instructions:  1. We will probably present your case to the transplant team soon.     Page the VAD Coordinator on call if you gain more than 3 lb in a day or 5 in a week. Please also page if you feel unwell or have alarms.     Great to see you in clinic today. To Page the VAD Coordinator on call, dial [phone] option #[phone] and ask to speak to the VAD coordinator on call.

## 2020-10-02 NOTE — LETTER
10/2/2020      RE: Jim Willingham  [address] [facility] [address]       Dear Colleague,    Thank you for the opportunity to participate in the care of your patient, Jim Willingham, at the [facility] HEART [facility]. Please see a copy of my visit note below.    October 2, 2020    HPI:   Jim Willingham is a 63 year old male with a past medical history of NICM (EF 20%) s/p HM II LVAD placement (6/19/17) at DT (obesity) - clinic visit is for LVAD follow up given recent hospitalization for staph hominis bacteremia.  He is presently on vancomycin and will have a total course of 6 weeks.     He was feeling poorly prior to this last admission but has been feeling better now.     Of note he has lost a substantial amount of weight and this was intentional-as he wants to be considered for transplant.  His transplant evaluation is ongoing.     Other things have been going on in the last 6 months include atrial fibrillation starting earlier this year.  He did see electrophysiology as the pacemaker settings were changed and there was some discussion about whether or not he should have a cardioversion or just leave him in atrial fibrillation.     He was admitted with pneumonia at the end of February.     Right now he feels that he can walk several blocks before stopping.  He denies dizziness/lightheadedness.  His diuretics were recently decreased and he does feel bloating.     He denies overt stroke symptoms.  He feels as though he does not have increased lower extremity edema or increased abdominal girth.    He denies any chest pain, edema,  orthopnea, PND. He denies fevers, chills, driveline redness, tenderness, or discharge. No LVAD alarms or blood in the urine or stools.      PAST MEDICAL HISTORY:  Past Medical History:   Diagnosis Date     Benign essential hypertension 5/11/2017     Cardiomyopathy, unspecified (H) 5/8/2017     CKD (chronic kidney disease)  stage 3, GFR 30-59 ml/min 5/11/2017     Depression 5/11/2017     Diabetes mellitus (H) 5/11/2017     H/O gastric bypass 5/11/2017     ICD (implantable cardioverter-defibrillator), biventricular, in situ 5/11/2017     LVAD (left ventricular assist device) present (H)      NICM (nonischemic cardiomyopathy) (H)/ EF 20% 5/11/2017    ECHO: LVEDd. 7.66 cm, Restrictive pattern , Severe mitral valve regurgitation     CECILIA (obstructive sleep apnea) 5/11/2017     Paroxysmal atrial fibrillation (H) 5/11/2017     Paroxysmal VT (H) 5/11/2017     Uncomplicated asthma      Vitamin B12 deficiency (non anemic) 5/11/2017       FAMILY HISTORY:  Family History   Problem Relation Age of Onset     Cerebrovascular Disease Mother      Diabetes Mother      Hypertension Mother      Coronary Artery Disease Father      Diabetes Type 2  Father      SOCIAL HISTORY:  No changes     CURRENT MEDICATIONS:  Current Outpatient Medications   Medication Sig Dispense Refill     acetaminophen (TYLENOL) 325 MG tablet Take 650 mg by mouth every 6 hours as needed for mild pain       albuterol (PROAIR HFA) 108 (90 Base) MCG/ACT inhaler Inhale 2 puffs into the lungs every 4 hours as needed for shortness of breath / dyspnea or wheezing        allopurinol (ZYLOPRIM) 100 MG tablet Take 2 tablets (200 mg) by mouth daily 30 tablet 3     amiodarone (PACERONE) 200 MG tablet Take 400 mg by mouth daily       aspirin 81 MG chewable tablet 1 tablet (81 mg) by Oral or Feeding Tube route daily 36 tablet      blood glucose monitoring (ONE TOUCH ULTRA 2) meter device kit Use to test blood sugar four times daily or as directed. 1 kit 0     blood glucose VI test strip Use to test blood sugar four times daily or as directed. 200 each 0     bumetanide (BUMEX) 0.5 MG tablet Take 2 tablets (1 mg) by mouth 2 times daily 180 tablet 3     citalopram (CELEXA) 10 MG tablet Take 3 tablets (30 mg) by mouth At Bedtime 90 tablet 3     cyanocobalamin (VITAMIN B12) 1000 MCG/ML injection  Inject 1,000 mcg into the muscle every 30 days       fluticasone-vilanterol (BREO ELLIPTA) 200-25 MCG/INH oral inhaler Inhale 1 puff into the lungs daily       gabapentin (NEURONTIN) 300 MG capsule Take 2 capsules (600 mg) by mouth At Bedtime 30 capsule 3     hydrALAZINE (APRESOLINE) 10 MG tablet Take 3 tablets (30 mg) by mouth 3 times daily 545 tablet 3     insulin glargine (LANTUS SOLOSTAR) 100 UNIT/ML pen Inject 6 Units Subcutaneous At Bedtime 9 mL 3     insulin lispro (HUMALOG KWIKPEN) 100 UNIT/ML (1 unit dial) KWIKPEN Inject 1-7 Units Subcutaneous 4 times daily 3 mL 1     insulin pen needle (32G X 4 MM) 32G X 4 MM miscellaneous Use four pen needles daily or as directed. 110 each 0     montelukast (SINGULAIR) 10 MG tablet Take 10 mg by mouth At Bedtime       pantoprazole (PROTONIX) 40 MG EC tablet Take 1 tablet (40 mg) by mouth every morning 60 tablet 5     potassium chloride ER (KLOR-CON M) 10 MEQ CR tablet Take 2 tablets (20 mEq) by mouth daily 30 tablet 4     predniSONE (DELTASONE) 5 MG tablet Take 2 tablets (10 mg) by mouth daily for 7 days, THEN 1 tablet (5 mg) daily for 20 days. 34 tablet 0     traMADol (ULTRAM) 50 MG tablet Take 2 tablets (100 mg) by mouth every 6 hours as needed for moderate pain or breakthrough pain 28 tablet      umeclidinium (INCRUSE ELLIPTA) 62.5 MCG/INH oral inhaler Inhale 1 puff into the lungs daily       vancomycin 1,750 mg Inject 1,750 mg into the vein every 24 hours 7.5 L 3     warfarin ANTICOAGULANT (COUMADIN) 5 MG tablet Take 1 tablet (5 mg) by mouth daily 30 tablet 3     zinc sulfate (ZINCATE) 220 (50 Zn) MG capsule Take 220 mg by mouth 2 times daily       Review of systems: 12 point review of systems negative unless noted in the HPI-     EXAM:  BP (!) 84/0 (BP Location: Left arm, Patient Position: Chair, Cuff Size: Adult Large)   Wt 100.7 kg (222 lb)   SpO2 98%   BMI 33.75 kg/m    General: appears comfortable, alert and articulate, in a wheelchair , not on oxygen   Head:  normocephalic, atraumatic  Eyes: anicteric sclera, EOMI  Neck: no adenopathy  Orophyarynx: moist mucosa, no lesions, dentition intact  Heart:LVAD hum, PMI diffuse   Lungs: clear, no rales or wheezing  Abdomen: soft, non-tender, bowel sounds present, no hepatosplenomegaly  Extremities: no clubbing, cyanosis or edema  Neurological: normal speech and affect, no gross motor deficits       Interpretation Summary  HM2 at 9400RPM.  Severely (EF <30%) reduced left ventricular function is present. Mild left  ventricular dilation is present. LVIDd 61mm.  Septum midline.  Aortic valve remain closed with no AI. Normal inflow velocities (62 cm/s).  Outflow velocity cannot be assessed.  Global right ventricular function is mildly to moderately reduced.  Dilation of the inferior vena cava is present with abnormal respiratory  variation in diameter suggests a high RA pressure estimated at 15 mmHg or  greater.  Mild to moderate mitral insufficiency is present.     This study was compared with the study from 8/5/2020 .  IVC is dilated now with higher RA pressure. Otherwise no significant change      RA Pressures  3:41 PM   6    7    7      107      RV Pressures  3:42 PM 24        6     153      PA Pressures  3:43 PM 23    11    15        156      PCW Pressures  3:42 PM   4    4    5      153      Blood Flow Results Phase: Baseline     Time Results  Indexed Values (L/min/m2)   QP  3:32 PM 4.05 L/min    1.84      QS  3:32 PM 4.05 L/min    1.84      Blood Oximetry Phase: Baseline     Time Hb  SAT(%)  PO2  Content (mL/dL) PA Sat (%)   PA  3:32 PM  58.3 %      58.3      Art  3:32 PM  99 %     17.64               PA due to reports of continued mild/moderate SOB with mild pitting edema of BLE. Patient has a Medtronic Biventricular ICD programmed AAIR. Normal ICD function. No episodes/arrhythmias recorded. Presenting EGM = SR @ 85 bpm. AP = 0.5%.  = 0%. OptiVol fluid index is above the threshold >200 since mid August. Estimated battery  longevity to MARVA = 10 months. No short v-v intervals recorded. Lead trends appear stable. Patient notified of interrogation results by voicemail.    Assessment and Plan:    In summary Jim Willingham has a history of NICM (EF 20%) s/p HM II LVAD as DT (initially DT due to obesity).  Overall he has been doing well.   His hemodynamics over the summer were reassuring that his wedge pressure is normal and his index is preserved.    In the larger picture, the biggest barriers to cardiac transplant include his lung function and need for conditioning. He also just had a katerinjuana test positive but this has never been an issue in the past and he may have had second hand exposure- will repeat and go from there     I would like to bring his case to the larger group for discussion   I want him to work on exercise, he has done a great job on his weight too     Chronic systolic heart failure secondary to NICM.    Stage D    NYHA Class III  ACEi/ARB: yes  BB: on Toprol XL YES   Aldosterone antagonist: Aldactone to 25 mg daily.   SCD prophylaxis: CRT-D  Fluid status: slightly volume up - allowing to increase diuretics as per AVS- will obtain labs 2 weeks      MAP controlled on current regimen.  Anticoagulation: Warfarin INR goal 2-3.   Antiplatelet: restart ASA dose 81 mg daily.  LDH: Stable     Staph hominis bacteremia: cleared and still actively being treated on vancomycin- will follow up with ID     CKD Stage III:Overall stable     Paroxysmal Atrial Fibrillation- in sinus rhythm presently - see VT plan above, on AC     History of ventricular tachycardia with appropriate therapy-we will not change medical therapy for now.     paroxysmal AF: in sinus presently     Marajuana positive: will repeat     COPD: per pulmonary     RTC 3 months with NP 6 with me - sooner as needed            Delisa Montgomery MD   of Medicine   [facility] Division of Cardiology       Please do not hesitate to contact me if  you have any questions/concerns.     Sincerely,     Delisa Montgomery MD

## 2020-10-05 ENCOUNTER — HOME INFUSION (PRE-WILLOW HOME INFUSION) (OUTPATIENT)
Dept: PHARMACY | Facility: CLINIC | Age: 65
End: 2020-10-05

## 2020-10-05 ENCOUNTER — DOCUMENTATION ONLY (OUTPATIENT)
Dept: OTHER | Facility: CLINIC | Age: 65
End: 2020-10-05

## 2020-10-05 ENCOUNTER — TELEPHONE (OUTPATIENT)
Dept: ANTICOAGULATION | Facility: CLINIC | Age: 65
End: 2020-10-05

## 2020-10-05 DIAGNOSIS — I50.22 CHRONIC SYSTOLIC CONGESTIVE HEART FAILURE (H): ICD-10-CM

## 2020-10-05 DIAGNOSIS — Z79.01 LONG TERM CURRENT USE OF ANTICOAGULANT THERAPY: ICD-10-CM

## 2020-10-05 DIAGNOSIS — Z95.811 LVAD (LEFT VENTRICULAR ASSIST DEVICE) PRESENT (H): ICD-10-CM

## 2020-10-05 NOTE — TELEPHONE ENCOUNTER
Called Jim to remind him he is due for an INR.  He states a [facility] is seeing him tomorrow and will draw it with his other labs.  Kirsty Bermudez RN

## 2020-10-06 ENCOUNTER — HOME INFUSION (PRE-WILLOW HOME INFUSION) (OUTPATIENT)
Dept: PHARMACY | Facility: CLINIC | Age: 65
End: 2020-10-06

## 2020-10-06 ENCOUNTER — MEDICAL CORRESPONDENCE (OUTPATIENT)
Dept: HEALTH INFORMATION MANAGEMENT | Facility: CLINIC | Age: 65
End: 2020-10-06

## 2020-10-06 ENCOUNTER — ANTICOAGULATION THERAPY VISIT (OUTPATIENT)
Dept: ANTICOAGULATION | Facility: CLINIC | Age: 65
End: 2020-10-06

## 2020-10-06 ENCOUNTER — EXTERNAL ORDER RESULTS (OUTPATIENT)
Dept: INFECTIOUS DISEASES | Facility: CLINIC | Age: 65
End: 2020-10-06

## 2020-10-06 DIAGNOSIS — I50.22 CHRONIC SYSTOLIC CONGESTIVE HEART FAILURE (H): ICD-10-CM

## 2020-10-06 DIAGNOSIS — Z79.01 LONG TERM CURRENT USE OF ANTICOAGULANT THERAPY: ICD-10-CM

## 2020-10-06 DIAGNOSIS — Z95.811 LVAD (LEFT VENTRICULAR ASSIST DEVICE) PRESENT (H): Primary | ICD-10-CM

## 2020-10-06 DIAGNOSIS — Z95.811 LVAD (LEFT VENTRICULAR ASSIST DEVICE) PRESENT (H): ICD-10-CM

## 2020-10-06 LAB
ALBUMIN SERPL-MCNC: 3.7 G/DL (ref 3.4–5)
ALP SERPL-CCNC: 117 U/L (ref 40–150)
ALT SERPL W P-5'-P-CCNC: 57 U/L (ref 0–70)
ANION GAP SERPL CALCULATED.3IONS-SCNC: 7 MMOL/L (ref 3–14)
AST SERPL W P-5'-P-CCNC: 40 U/L (ref 0–45)
BASOPHILS # BLD AUTO: 0.1 10E9/L (ref 0–0.2)
BASOPHILS NFR BLD AUTO: 0.8 %
BILIRUB SERPL-MCNC: 0.5 MG/DL (ref 0.2–1.3)
BUN SERPL-MCNC: 32 MG/DL (ref 7–30)
CALCIUM SERPL-MCNC: 7.9 MG/DL (ref 8.5–10.1)
CHLORIDE SERPL-SCNC: 107 MMOL/L (ref 94–109)
CO2 SERPL-SCNC: 25 MMOL/L (ref 20–32)
CREAT SERPL-MCNC: 1.52 MG/DL (ref 0.66–1.25)
CRP SERPL-MCNC: 5.7 MG/L (ref 0–8)
DIFFERENTIAL METHOD BLD: ABNORMAL
EOSINOPHIL # BLD AUTO: 0 10E9/L (ref 0–0.7)
EOSINOPHIL NFR BLD AUTO: 0.2 %
ERYTHROCYTE [DISTWIDTH] IN BLOOD BY AUTOMATED COUNT: 18.6 % (ref 10–15)
ERYTHROCYTE [SEDIMENTATION RATE] IN BLOOD BY WESTERGREN METHOD: 20 MM/H (ref 0–20)
GFR SERPL CREATININE-BSD FRML MDRD: 48 ML/MIN/{1.73_M2}
GLUCOSE SERPL-MCNC: 121 MG/DL (ref 70–99)
HCT VFR BLD AUTO: 33.7 % (ref 40–53)
HGB BLD-MCNC: 10 G/DL (ref 13.3–17.7)
IMM GRANULOCYTES # BLD: 0 10E9/L (ref 0–0.4)
IMM GRANULOCYTES NFR BLD: 0.5 %
INR PPP: 1.92 (ref 0.86–1.14)
LYMPHOCYTES # BLD AUTO: 1.2 10E9/L (ref 0.8–5.3)
LYMPHOCYTES NFR BLD AUTO: 19 %
MCH RBC QN AUTO: 27.8 PG (ref 26.5–33)
MCHC RBC AUTO-ENTMCNC: 29.7 G/DL (ref 31.5–36.5)
MCV RBC AUTO: 94 FL (ref 78–100)
MONOCYTES # BLD AUTO: 0.5 10E9/L (ref 0–1.3)
MONOCYTES NFR BLD AUTO: 8.3 %
NEUTROPHILS # BLD AUTO: 4.4 10E9/L (ref 1.6–8.3)
NEUTROPHILS NFR BLD AUTO: 71.2 %
NRBC # BLD AUTO: 0 10*3/UL
NRBC BLD AUTO-RTO: 0 /100
PLATELET # BLD AUTO: 210 10E9/L (ref 150–450)
POTASSIUM SERPL-SCNC: 3.8 MMOL/L (ref 3.4–5.3)
PROT SERPL-MCNC: 6.5 G/DL (ref 6.8–8.8)
RBC # BLD AUTO: 3.6 10E12/L (ref 4.4–5.9)
SODIUM SERPL-SCNC: 139 MMOL/L (ref 133–144)
VANCOMYCIN SERPL-MCNC: 16.1 MG/L
WBC # BLD AUTO: 6.2 10E9/L (ref 4–11)

## 2020-10-06 PROCEDURE — 85652 RBC SED RATE AUTOMATED: CPT | Performed by: COLON & RECTAL SURGERY

## 2020-10-06 PROCEDURE — 85610 PROTHROMBIN TIME: CPT | Performed by: COLON & RECTAL SURGERY

## 2020-10-06 PROCEDURE — 80202 ASSAY OF VANCOMYCIN: CPT | Performed by: COLON & RECTAL SURGERY

## 2020-10-06 PROCEDURE — 80053 COMPREHEN METABOLIC PANEL: CPT | Performed by: COLON & RECTAL SURGERY

## 2020-10-06 PROCEDURE — 85025 COMPLETE CBC W/AUTO DIFF WBC: CPT | Performed by: COLON & RECTAL SURGERY

## 2020-10-06 PROCEDURE — 86140 C-REACTIVE PROTEIN: CPT | Performed by: COLON & RECTAL SURGERY

## 2020-10-06 NOTE — PROGRESS NOTES
This is a recent snapshot of the patient's [facility] medical record.  For current drug dose and complete information and questions, call [phone] or In Basket pool, [facility] home infusion ([id number])  CSN Number:  [id number]

## 2020-10-06 NOTE — PROGRESS NOTES
ANTICOAGULATION FOLLOW-UP CLINIC VISIT    Patient Name:  Jim Willingham  Date:  10/6/2020  Contact Type:  Telephone    SUBJECTIVE:         OBJECTIVE    Recent labs: (last 7 days)     10/06/20  1320   INR 1.92*       ASSESSMENT / PLAN  INR assessment SUB    Recheck INR In: 3 DAYS    INR Location Clinic      Anticoagulation Summary  As of 10/6/2020    INR goal:  2.0-3.0   TTR:  52.3 % (10.9 mo)   INR used for dosin.92 (10/6/2020)   Warfarin maintenance plan:  7.5 mg (5 mg x 1.5) every Sun, Thu; 5 mg (5 mg x 1) all other days   Full warfarin instructions:  10/6: 7.5 mg; 10/7: 7.5 mg; 10/8: 5 mg; Otherwise 7.5 mg every Sun, Thu; 5 mg all other days   Weekly warfarin total:  40 mg   Plan last modified:  Delisa Hillman RN (9/15/2020)   Next INR check:  10/9/2020   Priority:  Critical   Target end date:  Indefinite    Indications    LVAD (left ventricular assist device) present (H) [Z95.811]  Long-term (current) use of anticoagulants [Z79.01] [Z79.01]  Chronic systolic congestive heart failure (H) [I50.22]             Anticoagulation Episode Summary     INR check location:      Preferred lab:      Send INR reminders to:  [facility]    Comments:  +++Patient drawn at Home care visit Q Mon. (8/10/20)+++  Labs drawn either at [facility] or [facility], See 3/5/20 ADDENDUM, pt. weekly INR >2.0 for Cardioversion  LVAD placed 17  HM II  ASA 81 mg daily      Anticoagulation Care Providers     Provider Role Specialty Phone number    Delisa Montgomery MD Referring Cardiology [phone]            See the Encounter Report to view Anticoagulation Flowsheet and Dosing Calendar (Go to Encounters tab in chart review, and find the Anticoagulation Therapy Visit)  Spoke with patient.  Patient had LVAD placed on:   17  Type of LVAD: HM2*  Patient's current Aspirin dose: 81mg  LVAD Protocol followed:  Yes  Delisa Hillman RN

## 2020-10-06 NOTE — PROGRESS NOTES
Outpatient Rheumatology Phone Follow-up  This visit was conducted via synchronous PHONE visit due to the current COVID-19 crisis to reduce patient risk.  Verbal consent was obtained and is documented below.    Name: Jim Willingham    MRN [id number]   Today's date: 10/7/2020         Reason for visit: Gout follow-up        Assessment & Plan:   65 year old male with a history of gout, CTS, NICM, (EF 20%) s/p LVAD placement (6/19/17), afib, RV failure, CKD3, DM2 c/b neuropathy, CECILIA, HTN, asthma who was evaluated by me for assistance with gout management while inpatient with staph hominis bacteremia. Most recent uric acid at that time was from 1/21/20 at was 9. He had been managed on 20mg prednisone and 100mg allopurinol for 3 years without titration. During his inpatient stay, we increased his allopurinol to 200mg daily and started his prednisone taper. I see him today for phone visit follow-up.  He is on 200mg allopurinol daily and remains 5mg of prednisone daily for paradoxical flare prophylaxis. Due to his co-morbidities, his serum uric acid goal is <5. No flares of inflammatory arthritis since I saw him inpatient. No worsening of his CTS with prednisone taper. Will check serum uric acid and continue to uptitrate allopurinol.    PLAN:  1) Continue prednisone 5mg daily for paradoxical flare prophylaxis  2) If flare prednisone 30mg daily x3 days, lowest / shortest duration given DMII, will call if he flares to let us know. He has prednisone at home to use for this  3) Stay on 200mg allopurinol for now, will check serum uric acid, and once it returns will call patient and likely increase.  4) Standing CBC/CMP/sUA ordered. With each result, will uptitrate allopurinol.    Elbert Pettit MD  Rheumatology      Subjective:   HPI:  65 year old male with CKDIII and NICM s/p LVAD who I follow for gout. No flares of inflammatory arthritis since I saw him last on 9/21/20. At that time increased his allopurinol from 100mg  daily to 200mg daily and started prednisone taper. Currently on 5mg daily. Prior to starting the above regimen 3 years ago, his acute inflammatory arthritis flares would involve the greater MTP. Cause 10/10 pain. Be red/ hot/ swollen. Very tender to even light touch. Would get better with prednisone. No radiation of the pain. He has not had this since starting 20mg of prednisone. Has not had this with his taper either. No return of CTS symptoms with his prednisone taper, which was a concern of his. Still with his baseline tingling feeling in his first 3 digits, though not waking him from sleep.    14 point review of systems obtained and negative if not documented above.    HPI from initial consult on 9/21/2020  Jim Willingham is a 65 year old with a hx of gout, NICM (EF20%) s/p LVAD placement (6/19/17) d/t obesity, A fib, RV failure, CKD3, DM2 c/b neuropathy, CECILIA, HTN, asthma who presents with a few days of myalgias, chills, and headaches. Patient states that he hasn't had a gout flare in more than 3 years, ever since he was started on allopurinol. When he does have a flare, it is usually in his big toes on his feet. He states that he has been taking 20mg prednisone every day for 5 years for his carpal tunnel syndrome, which was prescribed by his PCP Dr. Salas (per chart review). He has tried to taper himself off of the 20mg steroids a number of times, most recently about two months ago, and will notice that his hand will get much more tingly, numb, and painful. The pain in his hands improves only with combination therapy with gabapentin and steroids. He denies taking oral prednisone for his respiratory issues, although notes that he has occasionally been increased on his prednisone dose for flares of his respiratory disease.     Per chart review, Mr. Willingham was hospitalized for LVAD placement in June 2017, at which point he had a gout flair and was seen my rheumatology consult service and was discharged on 40 mg  prednisone for 5 days as needed for a flair in addition to allopurinol and colchicine.; He was then seen on 7/25/17 for f/u for a gout flair that started on 7/17/20 and was prescribed a 20 mg prednisone dose for 3 days, to be tapered to 10 mg for 3 days and then 5 mg for 3 days.     Patient denies any vision changes, headaches, focal weakness or numbness.  No hematuria or hematochezia.      Treatment History  - Allopurinol 100 mg daily  - Prednisone 20 mg daily    Past Medical History  Past Medical History:   Diagnosis Date     Benign essential hypertension 5/11/2017     Cardiomyopathy, unspecified (H) 5/8/2017     CKD (chronic kidney disease) stage 3, GFR 30-59 ml/min 5/11/2017     Depression 5/11/2017     Diabetes mellitus (H) 5/11/2017     H/O gastric bypass 5/11/2017     ICD (implantable cardioverter-defibrillator), biventricular, in situ 5/11/2017     LVAD (left ventricular assist device) present (H)      NICM (nonischemic cardiomyopathy) (H)/ EF 20% 5/11/2017    ECHO: LVEDd. 7.66 cm, Restrictive pattern , Severe mitral valve regurgitation     CECILIA (obstructive sleep apnea) 5/11/2017     Paroxysmal atrial fibrillation (H) 5/11/2017     Paroxysmal VT (H) 5/11/2017     Uncomplicated asthma      Vitamin B12 deficiency (non anemic) 5/11/2017     Past Surgical History  Past Surgical History:   Procedure Laterality Date     ANESTHESIA CARDIOVERSION N/A 5/11/2020    Procedure: ANESTHESIA, FOR CARDIOVERSION @1100;  Surgeon: GENERIC ANESTHESIA PROVIDER;  Location: [facility] OR     CV RIGHT HEART CATH N/A 7/24/2019    Procedure: CV RIGHT HEART CATH;  Surgeon: Renu Sears MD;  Location:  HEART CARDIAC CATH LAB     CV RIGHT HEART CATH N/A 8/5/2020    Procedure: CV RIGHT HEART CATH;  Surgeon: Nicola Seth MD;  Location:  HEART CARDIAC CATH LAB     GI SURGERY  2003    [facility] en Y     INSERT VENTRICULAR ASSIST DEVICE LEFT (HEARTMATE II) N/A 6/19/2017    Procedure: INSERT VENTRICULAR ASSIST DEVICE LEFT (HEARTMATE II);   Median Sternotomy Heartmate II Left Ventricular Assist Device Insertion on Pump Oxygenator;  Surgeon: Ronnie Quigley MD;  Location: [facility] OR     ORTHOPEDIC SURGERY  1994    right knee wired     PICC DOUBLE LUMEN PLACEMENT Right 09/23/2020    5FR PICC DL. Length-43cm (1cm out).       Medications  Current Outpatient Medications   Medication     acetaminophen (TYLENOL) 325 MG tablet     albuterol (PROAIR HFA) 108 (90 Base) MCG/ACT inhaler     allopurinol (ZYLOPRIM) 100 MG tablet     amiodarone (PACERONE) 200 MG tablet     aspirin 81 MG chewable tablet     blood glucose monitoring (ONE TOUCH ULTRA 2) meter device kit     blood glucose VI test strip     bumetanide (BUMEX) 0.5 MG tablet     citalopram (CELEXA) 10 MG tablet     cyanocobalamin (VITAMIN B12) 1000 MCG/ML injection     fluticasone-vilanterol (BREO ELLIPTA) 200-25 MCG/INH oral inhaler     gabapentin (NEURONTIN) 300 MG capsule     hydrALAZINE (APRESOLINE) 10 MG tablet     insulin glargine (LANTUS SOLOSTAR) 100 UNIT/ML pen     insulin lispro (HUMALOG KWIKPEN) 100 UNIT/ML (1 unit dial) KWIKPEN     insulin pen needle (32G X 4 MM) 32G X 4 MM miscellaneous     montelukast (SINGULAIR) 10 MG tablet     pantoprazole (PROTONIX) 40 MG EC tablet     potassium chloride ER (KLOR-CON M) 10 MEQ CR tablet     predniSONE (DELTASONE) 5 MG tablet     traMADol (ULTRAM) 50 MG tablet     umeclidinium (INCRUSE ELLIPTA) 62.5 MCG/INH oral inhaler     vancomycin 1,750 mg     warfarin ANTICOAGULANT (COUMADIN) 5 MG tablet     zinc sulfate (ZINCATE) 220 (50 Zn) MG capsule     No current facility-administered medications for this visit.      Allergies  Allergies   Allergen Reactions     Beer Shortness Of Breath     Grass Shortness Of Breath     Ace Inhibitors Cough     Dust Mites Other (See Comments)     Asthma     Mold Other (See Comments)     Asthma     Penicillins Other (See Comments)     Unknown - childhood exposure    Tolerated Zosyn 9/18-9/20/2020     Sulfa Drugs Other (See Comments) and  Unknown     Unknown childhood reaction     Family History  Family History   Problem Relation Age of Onset     Cerebrovascular Disease Mother      Diabetes Mother      Hypertension Mother      Coronary Artery Disease Father      Diabetes Type 2  Father    No other family history of autoimmune disease    Social History  Social History     Tobacco Use     Smoking status: Former Smoker     Quit date:      Years since quittin.7     Smokeless tobacco: Never Used   Substance Use Topics     Alcohol use: No     Drug use: No        Objective:    No vitals or physical exam for this phone visit.    Labs:  10/9/20  WBC 6.2  HGB 10.6    Cr 1.34  GFR 65    2020 serum uric acid 9    Imaging:  No MSK imaging to review in our system       Jim Willingham is a 65 year old male who is being evaluated via a billable PHONE visit.    Patient consent: Yes  Patient location: Home  Provider location: [facility] RHEUMATOLOGY  Duration of phone call: 15 minutes    Elbert Pettit MD  Rheumatology

## 2020-10-06 NOTE — PROGRESS NOTES
This is a recent snapshot of the patient's [facility] medical record.  For current drug dose and complete information and questions, call [phone] or In Basket pool, [facility] home infusion ([id number])  CSN Number:  [id number]

## 2020-10-07 ENCOUNTER — HOME INFUSION (PRE-WILLOW HOME INFUSION) (OUTPATIENT)
Dept: PHARMACY | Facility: CLINIC | Age: 65
End: 2020-10-07

## 2020-10-07 ENCOUNTER — VIRTUAL VISIT (OUTPATIENT)
Dept: RHEUMATOLOGY | Facility: CLINIC | Age: 65
End: 2020-10-07
Attending: INTERNAL MEDICINE
Payer: COMMERCIAL

## 2020-10-07 VITALS — WEIGHT: 224 LBS | BODY MASS INDEX: 34.06 KG/M2 | SYSTOLIC BLOOD PRESSURE: 94 MMHG | DIASTOLIC BLOOD PRESSURE: 62 MMHG

## 2020-10-07 DIAGNOSIS — M1A.3790 CHRONIC GOUT DUE TO RENAL IMPAIRMENT INVOLVING FOOT WITHOUT TOPHUS, UNSPECIFIED LATERALITY: Primary | ICD-10-CM

## 2020-10-07 PROCEDURE — 99213 OFFICE O/P EST LOW 20 MIN: CPT | Mod: TEL | Performed by: INTERNAL MEDICINE

## 2020-10-07 ASSESSMENT — PAIN SCALES - GENERAL: PAINLEVEL: MODERATE PAIN (5)

## 2020-10-07 NOTE — PROGRESS NOTES
This is a recent snapshot of the patient's [facility] medical record.  For current drug dose and complete information and questions, call [phone] or In Basket pool, [facility] home infusion ([id number])  CSN Number:  [id number]

## 2020-10-07 NOTE — PROGRESS NOTES
Addendum 10/7/20 Claudia from [facility] left a voice message with pt's INR results. Called Claudia back letting her know we got the INR results and [facility] see's pt weekly. Radha Pedro RN

## 2020-10-07 NOTE — LETTER
10/7/2020       RE: Jim Willingham  [address]  [place] [address]     Dear Colleague,    Thank you for referring your patient, Jim Willingham, to the [facility] RHEUMATOLOGY CLINIC [place] [facility]. Please see a copy of my visit note below.      Outpatient Rheumatology Phone Follow-up  This visit was conducted via synchronous PHONE visit due to the current COVID-19 crisis to reduce patient risk.  Verbal consent was obtained and is documented below.    Name: Jim Willingham    MRN [id number]   Today's date: 10/7/2020         Reason for visit: Gout follow-up        Assessment & Plan:   65 year old male with a history of gout, CTS, NICM, (EF 20%) s/p LVAD placement (6/19/17), afib, RV failure, CKD3, DM2 c/b neuropathy, CECILIA, HTN, asthma who was evaluated by me for assistance with gout management while inpatient with staph hominis bacteremia. Most recent uric acid at that time was from 1/21/20 at was 9. He had been managed on 20mg prednisone and 100mg allopurinol for 3 years without titration. During his inpatient stay, we increased his allopurinol to 200mg daily and started his prednisone taper. I see him today for phone visit follow-up.  He is on 200mg allopurinol daily and remains 5mg of prednisone daily for paradoxical flare prophylaxis. Due to his co-morbidities, his serum uric acid goal is <5. No flares of inflammatory arthritis since I saw him inpatient. No worsening of his CTS with prednisone taper. Will check serum uric acid and continue to uptitrate allopurinol.    PLAN:  1) Continue prednisone 5mg daily for paradoxical flare prophylaxis  2) If flare prednisone 30mg daily x3 days, lowest / shortest duration given DMII, will call if he flares to let us know. He has prednisone at home to use for this  3) Stay on 200mg allopurinol for now, will check serum uric acid, and once it returns will call patient and likely increase.  4) Standing CBC/CMP/sUA  ordered. With each result, will uptitrate allopurinol.    Elbert Pettit MD  Rheumatology      Subjective:   HPI:  65 year old male with CKDIII and NICM s/p LVAD who I follow for gout. No flares of inflammatory arthritis since I saw him last on 9/21/20. At that time increased his allopurinol from 100mg daily to 200mg daily and started prednisone taper. Currently on 5mg daily. Prior to starting the above regimen 3 years ago, his acute inflammatory arthritis flares would involve the greater MTP. Cause 10/10 pain. Be red/ hot/ swollen. Very tender to even light touch. Would get better with prednisone. No radiation of the pain. He has not had this since starting 20mg of prednisone. Has not had this with his taper either. No return of CTS symptoms with his prednisone taper, which was a concern of his. Still with his baseline tingling feeling in his first 3 digits, though not waking him from sleep.    14 point review of systems obtained and negative if not documented above.    HPI from initial consult on 9/21/2020  Jim Willingham is a 65 year old with a hx of gout, NICM (EF20%) s/p LVAD placement (6/19/17) d/t obesity, A fib, RV failure, CKD3, DM2 c/b neuropathy, CECILIA, HTN, asthma who presents with a few days of myalgias, chills, and headaches. Patient states that he hasn't had a gout flare in more than 3 years, ever since he was started on allopurinol. When he does have a flare, it is usually in his big toes on his feet. He states that he has been taking 20mg prednisone every day for 5 years for his carpal tunnel syndrome, which was prescribed by his PCP Dr. Salas (per chart review). He has tried to taper himself off of the 20mg steroids a number of times, most recently about two months ago, and will notice that his hand will get much more tingly, numb, and painful. The pain in his hands improves only with combination therapy with gabapentin and steroids. He denies taking oral prednisone for his respiratory issues,  although notes that he has occasionally been increased on his prednisone dose for flares of his respiratory disease.     Per chart review, Mr. Willingham was hospitalized for LVAD placement in June 2017, at which point he had a gout flair and was seen my rheumatology consult service and was discharged on 40 mg prednisone for 5 days as needed for a flair in addition to allopurinol and colchicine.; He was then seen on 7/25/17 for f/u for a gout flair that started on 7/17/20 and was prescribed a 20 mg prednisone dose for 3 days, to be tapered to 10 mg for 3 days and then 5 mg for 3 days.     Patient denies any vision changes, headaches, focal weakness or numbness.  No hematuria or hematochezia.      Treatment History  - Allopurinol 100 mg daily  - Prednisone 20 mg daily    Past Medical History  Past Medical History:   Diagnosis Date     Benign essential hypertension 5/11/2017     Cardiomyopathy, unspecified (H) 5/8/2017     CKD (chronic kidney disease) stage 3, GFR 30-59 ml/min 5/11/2017     Depression 5/11/2017     Diabetes mellitus (H) 5/11/2017     H/O gastric bypass 5/11/2017     ICD (implantable cardioverter-defibrillator), biventricular, in situ 5/11/2017     LVAD (left ventricular assist device) present (H)      NICM (nonischemic cardiomyopathy) (H)/ EF 20% 5/11/2017    ECHO: LVEDd. 7.66 cm, Restrictive pattern , Severe mitral valve regurgitation     CECILIA (obstructive sleep apnea) 5/11/2017     Paroxysmal atrial fibrillation (H) 5/11/2017     Paroxysmal VT (H) 5/11/2017     Uncomplicated asthma      Vitamin B12 deficiency (non anemic) 5/11/2017     Past Surgical History  Past Surgical History:   Procedure Laterality Date     ANESTHESIA CARDIOVERSION N/A 5/11/2020    Procedure: ANESTHESIA, FOR CARDIOVERSION @1100;  Surgeon: GENERIC ANESTHESIA PROVIDER;  Location: [facility] OR     CV RIGHT HEART CATH N/A 7/24/2019    Procedure: CV RIGHT HEART CATH;  Surgeon: Renu Sears MD;  Location:  HEART CARDIAC CATH LAB     CV  RIGHT HEART CATH N/A 8/5/2020    Procedure: CV RIGHT HEART CATH;  Surgeon: Nicola Seth MD;  Location:  HEART CARDIAC CATH LAB     GI SURGERY  2003    [facility]     INSERT VENTRICULAR ASSIST DEVICE LEFT (HEARTMATE II) N/A 6/19/2017    Procedure: INSERT VENTRICULAR ASSIST DEVICE LEFT (HEARTMATE II);  Median Sternotomy Heartmate II Left Ventricular Assist Device Insertion on Pump Oxygenator;  Surgeon: Ronnie Quigley MD;  Location: [facility]     ORTHOPEDIC SURGERY  1994    right knee wired     PICC DOUBLE LUMEN PLACEMENT Right 09/23/2020    5FR PICC DL. Length-43cm (1cm out).       Medications  Current Outpatient Medications   Medication     acetaminophen (TYLENOL) 325 MG tablet     albuterol (PROAIR HFA) 108 (90 Base) MCG/ACT inhaler     allopurinol (ZYLOPRIM) 100 MG tablet     amiodarone (PACERONE) 200 MG tablet     aspirin 81 MG chewable tablet     blood glucose monitoring (ONE TOUCH ULTRA 2) meter device kit     blood glucose VI test strip     bumetanide (BUMEX) 0.5 MG tablet     citalopram (CELEXA) 10 MG tablet     cyanocobalamin (VITAMIN B12) 1000 MCG/ML injection     fluticasone-vilanterol (BREO ELLIPTA) 200-25 MCG/INH oral inhaler     gabapentin (NEURONTIN) 300 MG capsule     hydrALAZINE (APRESOLINE) 10 MG tablet     insulin glargine (LANTUS SOLOSTAR) 100 UNIT/ML pen     insulin lispro (HUMALOG KWIKPEN) 100 UNIT/ML (1 unit dial) KWIKPEN     insulin pen needle (32G X 4 MM) 32G X 4 MM miscellaneous     montelukast (SINGULAIR) 10 MG tablet     pantoprazole (PROTONIX) 40 MG EC tablet     potassium chloride ER (KLOR-CON M) 10 MEQ CR tablet     predniSONE (DELTASONE) 5 MG tablet     traMADol (ULTRAM) 50 MG tablet     umeclidinium (INCRUSE ELLIPTA) 62.5 MCG/INH oral inhaler     vancomycin 1,750 mg     warfarin ANTICOAGULANT (COUMADIN) 5 MG tablet     zinc sulfate (ZINCATE) 220 (50 Zn) MG capsule     No current facility-administered medications for this visit.      Allergies  Allergies   Allergen Reactions     Beer  Shortness Of Breath     Grass Shortness Of Breath     Ace Inhibitors Cough     Dust Mites Other (See Comments)     Asthma     Mold Other (See Comments)     Asthma     Penicillins Other (See Comments)     Unknown - childhood exposure    Tolerated Zosyn -2020     Sulfa Drugs Other (See Comments) and Unknown     Unknown childhood reaction     Family History  Family History   Problem Relation Age of Onset     Cerebrovascular Disease Mother      Diabetes Mother      Hypertension Mother      Coronary Artery Disease Father      Diabetes Type 2  Father    No other family history of autoimmune disease    Social History  Social History     Tobacco Use     Smoking status: Former Smoker     Quit date:      Years since quittin.7     Smokeless tobacco: Never Used   Substance Use Topics     Alcohol use: No     Drug use: No        Objective:    No vitals or physical exam for this phone visit.    Labs:  10/9/20  WBC 6.2  HGB 10.6    Cr 1.34  GFR 65    2020 serum uric acid 9    Imaging:  No MSK imaging to review in our system       Jim Willingham is a 65 year old male who is being evaluated via a billable PHONE visit.    Patient consent: Yes  Patient location: Home  Provider location: [facility] RHEUMATOLOGY  Duration of phone call: 15 minutes    Elbert Pettit MD  Rheumatology

## 2020-10-08 ENCOUNTER — TELEPHONE (OUTPATIENT)
Dept: ANTICOAGULATION | Facility: CLINIC | Age: 65
End: 2020-10-08

## 2020-10-08 DIAGNOSIS — Z79.01 LONG TERM CURRENT USE OF ANTICOAGULANT THERAPY: ICD-10-CM

## 2020-10-08 DIAGNOSIS — I50.22 CHRONIC SYSTOLIC CONGESTIVE HEART FAILURE (H): ICD-10-CM

## 2020-10-08 DIAGNOSIS — Z95.811 LVAD (LEFT VENTRICULAR ASSIST DEVICE) PRESENT (H): ICD-10-CM

## 2020-10-08 NOTE — TELEPHONE ENCOUNTER
Received a call from Claudia in [facility].  She is calling for INR result and last warfarin recommendations. She states Jim told her he was taking 7.5 mg of warfarin TuWTh this week, which is different than what is documented.  Called Jim and he stated he took 7.5 mg of warfarin on Tu and 5 mg on W.  He will take 5 mg of warfarin on Th and recheck INR Friday, Oct. 9.      Please call [facility] on 10/9/2020 with INR result/recommendations/ and next INR date.  [facility] sees him on Tuesday.  Ph:  [phone].  Kirsty Bermudez RN

## 2020-10-08 NOTE — PROGRESS NOTES
This is a recent snapshot of the patient's [facility] medical record.  For current drug dose and complete information and questions, call [phone] or In Basket pool, [facility] ([id number])  CSN Number:  [id number]

## 2020-10-09 ENCOUNTER — ANTICOAGULATION THERAPY VISIT (OUTPATIENT)
Dept: ANTICOAGULATION | Facility: CLINIC | Age: 65
End: 2020-10-09

## 2020-10-09 DIAGNOSIS — Z95.811 LVAD (LEFT VENTRICULAR ASSIST DEVICE) PRESENT (H): ICD-10-CM

## 2020-10-09 DIAGNOSIS — Z79.01 LONG TERM CURRENT USE OF ANTICOAGULANT THERAPY: ICD-10-CM

## 2020-10-09 DIAGNOSIS — I50.22 CHRONIC SYSTOLIC CONGESTIVE HEART FAILURE (H): ICD-10-CM

## 2020-10-09 DIAGNOSIS — R78.81 BACTEREMIA: ICD-10-CM

## 2020-10-09 LAB
ANION GAP SERPL CALCULATED.3IONS-SCNC: 6 MMOL/L (ref 3–14)
BUN SERPL-MCNC: 33 MG/DL (ref 7–30)
CALCIUM SERPL-MCNC: 8.3 MG/DL (ref 8.5–10.1)
CHLORIDE SERPL-SCNC: 104 MMOL/L (ref 94–109)
CO2 SERPL-SCNC: 27 MMOL/L (ref 20–32)
CREAT SERPL-MCNC: 1.34 MG/DL (ref 0.66–1.25)
ERYTHROCYTE [DISTWIDTH] IN BLOOD BY AUTOMATED COUNT: 17.9 % (ref 10–15)
GFR SERPL CREATININE-BSD FRML MDRD: 56 ML/MIN/{1.73_M2}
GLUCOSE SERPL-MCNC: 115 MG/DL (ref 70–99)
HCT VFR BLD AUTO: 35.9 % (ref 40–53)
HGB BLD-MCNC: 10.6 G/DL (ref 13.3–17.7)
INR PPP: 2.09 (ref 0.86–1.14)
MCH RBC QN AUTO: 27.2 PG (ref 26.5–33)
MCHC RBC AUTO-ENTMCNC: 29.5 G/DL (ref 31.5–36.5)
MCV RBC AUTO: 92 FL (ref 78–100)
PLATELET # BLD AUTO: 206 10E9/L (ref 150–450)
POTASSIUM SERPL-SCNC: 3.9 MMOL/L (ref 3.4–5.3)
RBC # BLD AUTO: 3.89 10E12/L (ref 4.4–5.9)
SODIUM SERPL-SCNC: 137 MMOL/L (ref 133–144)
WBC # BLD AUTO: 6.2 10E9/L (ref 4–11)

## 2020-10-09 PROCEDURE — 80320 DRUG SCREEN QUANTALCOHOLS: CPT | Performed by: INTERNAL MEDICINE

## 2020-10-09 PROCEDURE — 80307 DRUG TEST PRSMV CHEM ANLYZR: CPT | Performed by: INTERNAL MEDICINE

## 2020-10-09 PROCEDURE — 84550 ASSAY OF BLOOD/URIC ACID: CPT | Performed by: STUDENT IN AN ORGANIZED HEALTH CARE EDUCATION/TRAINING PROGRAM

## 2020-10-09 PROCEDURE — 85027 COMPLETE CBC AUTOMATED: CPT | Performed by: STUDENT IN AN ORGANIZED HEALTH CARE EDUCATION/TRAINING PROGRAM

## 2020-10-09 PROCEDURE — 85610 PROTHROMBIN TIME: CPT | Performed by: STUDENT IN AN ORGANIZED HEALTH CARE EDUCATION/TRAINING PROGRAM

## 2020-10-09 PROCEDURE — 36415 COLL VENOUS BLD VENIPUNCTURE: CPT | Performed by: STUDENT IN AN ORGANIZED HEALTH CARE EDUCATION/TRAINING PROGRAM

## 2020-10-09 PROCEDURE — 80048 BASIC METABOLIC PNL TOTAL CA: CPT | Performed by: STUDENT IN AN ORGANIZED HEALTH CARE EDUCATION/TRAINING PROGRAM

## 2020-10-09 NOTE — PROGRESS NOTES
ANTICOAGULATION FOLLOW-UP CLINIC VISIT    Patient Name:  Jim Willingham  Date:  10/9/2020  Contact Type:  Telephone    SUBJECTIVE:  Patient Findings     Comments:  Spoke with Jim.  He reports no changes in health, diet, meds.  He states he is feeling pretty good and eating well.        Clinical Outcomes     Comments:  Spoke with Jim.  He reports no changes in health, diet, meds.  He states he is feeling pretty good and eating well.           OBJECTIVE    Recent labs: (last 7 days)     10/09/20  1120   INR 2.09*       ASSESSMENT / PLAN  INR assessment THER    Recheck INR In: 4 DAYS    INR Location Clinic      Anticoagulation Summary  As of 10/9/2020    INR goal:  2.0-3.0   TTR:  52.8 % (10.9 mo)   INR used for dosin.09 (10/9/2020)   Warfarin maintenance plan:  7.5 mg (5 mg x 1.5) every Sun, Thu; 5 mg (5 mg x 1) all other days   Full warfarin instructions:  7.5 mg every Sun, Thu; 5 mg all other days   Weekly warfarin total:  40 mg   Plan last modified:  Delisa Hillman RN (9/15/2020)   Next INR check:  10/13/2020   Priority:  Critical   Target end date:  Indefinite    Indications    LVAD (left ventricular assist device) present (H) [Z95.811]  Long-term (current) use of anticoagulants [Z79.01] [Z79.01]  Chronic systolic congestive heart failure (H) [I50.22]             Anticoagulation Episode Summary     INR check location:      Preferred lab:      Send INR reminders to:  [facility]    Comments:  +++Patient drawn at Home care visit Q Mon. (8/10/20)+++  Labs drawn either at [facility] or [facility]  LVAD placed 17   II  ASA 81 mg daily      Anticoagulation Care Providers     Provider Role Specialty Phone number    Delisa Montgomery MD Referring Cardiology [phone]            See the Encounter Report to view Anticoagulation Flowsheet and Dosing Calendar (Go to Encounters tab in chart review, and find the Anticoagulation Therapy Visit)    Spoke with Jim.  Spoke with  Claudia from  [facility] and gave her INR result/warfarin recommendations/ and next INR date.  [facility] will draw his INR on 10/13/2020.    Per Epic,Jim continues on prednisone and IV vancomycin.     Patient had LVAD placed on:   6/19/17  Type of LVAD: HM 2   Patient's current Aspirin dose: 81 mg daily  LVAD Protocol followed: Yes      Kirsty Bermudez RN

## 2020-10-10 LAB
AMPHETAMINES UR QL SCN: NEGATIVE
BARBITURATES UR QL: NEGATIVE
BENZODIAZ UR QL: NEGATIVE
CANNABINOIDS UR QL SCN: NEGATIVE
COCAINE UR QL: NEGATIVE
ETHANOL UR QL SCN: NEGATIVE
OPIATES UR QL SCN: NEGATIVE
PCP UR QL SCN: NEGATIVE

## 2020-10-11 NOTE — PATIENT INSTRUCTIONS
Thank you for getting your labs done on Friday. I will reach out with the results of the serum uric acid. Those results will guide your dose change in allopurinol.    Continue on 5mg prednisone daily. If you have flare of red/hot/swollen joint consistent with gout, can increase this dose for as few days as necessary to not affect your diabetes.     I have put in standing monthly labs to check your serum uric acid.    I will reach out early next week with the results of the labs you had done on Friday to make follow-up plans.    Thanks.  Elbert Pettit MD  Rheumatology

## 2020-10-12 ENCOUNTER — HOME INFUSION (PRE-WILLOW HOME INFUSION) (OUTPATIENT)
Dept: PHARMACY | Facility: CLINIC | Age: 65
End: 2020-10-12

## 2020-10-12 LAB — URATE SERPL-MCNC: 6.2 MG/DL (ref 3.5–7.2)

## 2020-10-13 ENCOUNTER — HOME INFUSION (PRE-WILLOW HOME INFUSION) (OUTPATIENT)
Dept: PHARMACY | Facility: CLINIC | Age: 65
End: 2020-10-13

## 2020-10-13 ENCOUNTER — MEDICAL CORRESPONDENCE (OUTPATIENT)
Dept: HEALTH INFORMATION MANAGEMENT | Facility: CLINIC | Age: 65
End: 2020-10-13

## 2020-10-13 ENCOUNTER — ANTICOAGULATION THERAPY VISIT (OUTPATIENT)
Dept: ANTICOAGULATION | Facility: CLINIC | Age: 65
End: 2020-10-13

## 2020-10-13 DIAGNOSIS — Z79.01 LONG TERM CURRENT USE OF ANTICOAGULANT THERAPY: ICD-10-CM

## 2020-10-13 DIAGNOSIS — Z95.811 LVAD (LEFT VENTRICULAR ASSIST DEVICE) PRESENT (H): ICD-10-CM

## 2020-10-13 DIAGNOSIS — I50.22 CHRONIC SYSTOLIC CONGESTIVE HEART FAILURE (H): ICD-10-CM

## 2020-10-13 DIAGNOSIS — M1A.3790 CHRONIC GOUT DUE TO RENAL IMPAIRMENT INVOLVING FOOT WITHOUT TOPHUS, UNSPECIFIED LATERALITY: Primary | ICD-10-CM

## 2020-10-13 LAB
ALBUMIN SERPL-MCNC: 3.6 G/DL (ref 3.4–5)
ALP SERPL-CCNC: 105 U/L (ref 40–150)
ALT SERPL W P-5'-P-CCNC: 44 U/L (ref 0–70)
ANION GAP SERPL CALCULATED.3IONS-SCNC: 6 MMOL/L (ref 3–14)
AST SERPL W P-5'-P-CCNC: 28 U/L (ref 0–45)
BASOPHILS # BLD AUTO: 0.1 10E9/L (ref 0–0.2)
BASOPHILS NFR BLD AUTO: 0.8 %
BILIRUB SERPL-MCNC: 0.7 MG/DL (ref 0.2–1.3)
BUN SERPL-MCNC: 28 MG/DL (ref 7–30)
CALCIUM SERPL-MCNC: 8.3 MG/DL (ref 8.5–10.1)
CHLORIDE SERPL-SCNC: 105 MMOL/L (ref 94–109)
CO2 SERPL-SCNC: 26 MMOL/L (ref 20–32)
CREAT SERPL-MCNC: 1.31 MG/DL (ref 0.66–1.25)
CRP SERPL-MCNC: 12 MG/L (ref 0–8)
DIFFERENTIAL METHOD BLD: ABNORMAL
EOSINOPHIL # BLD AUTO: 0.1 10E9/L (ref 0–0.7)
EOSINOPHIL NFR BLD AUTO: 1.2 %
ERYTHROCYTE [DISTWIDTH] IN BLOOD BY AUTOMATED COUNT: 17.2 % (ref 10–15)
ERYTHROCYTE [SEDIMENTATION RATE] IN BLOOD BY WESTERGREN METHOD: 10 MM/H (ref 0–20)
GFR SERPL CREATININE-BSD FRML MDRD: 57 ML/MIN/{1.73_M2}
GLUCOSE SERPL-MCNC: 96 MG/DL (ref 70–99)
HCT VFR BLD AUTO: 35.5 % (ref 40–53)
HGB BLD-MCNC: 10.7 G/DL (ref 13.3–17.7)
IMM GRANULOCYTES # BLD: 0 10E9/L (ref 0–0.4)
IMM GRANULOCYTES NFR BLD: 0.3 %
INR PPP: 2.64 (ref 0.86–1.14)
LYMPHOCYTES # BLD AUTO: 0.7 10E9/L (ref 0.8–5.3)
LYMPHOCYTES NFR BLD AUTO: 12.3 %
MCH RBC QN AUTO: 27.2 PG (ref 26.5–33)
MCHC RBC AUTO-ENTMCNC: 30.1 G/DL (ref 31.5–36.5)
MCV RBC AUTO: 90 FL (ref 78–100)
MONOCYTES # BLD AUTO: 0.7 10E9/L (ref 0–1.3)
MONOCYTES NFR BLD AUTO: 11.2 %
NEUTROPHILS # BLD AUTO: 4.5 10E9/L (ref 1.6–8.3)
NEUTROPHILS NFR BLD AUTO: 74.2 %
NRBC # BLD AUTO: 0 10*3/UL
NRBC BLD AUTO-RTO: 0 /100
PLATELET # BLD AUTO: 208 10E9/L (ref 150–450)
POTASSIUM SERPL-SCNC: 3.8 MMOL/L (ref 3.4–5.3)
PROT SERPL-MCNC: 6.6 G/DL (ref 6.8–8.8)
RBC # BLD AUTO: 3.94 10E12/L (ref 4.4–5.9)
SODIUM SERPL-SCNC: 137 MMOL/L (ref 133–144)
VANCOMYCIN SERPL-MCNC: 13.4 MG/L
WBC # BLD AUTO: 6 10E9/L (ref 4–11)

## 2020-10-13 PROCEDURE — 85610 PROTHROMBIN TIME: CPT | Performed by: COLON & RECTAL SURGERY

## 2020-10-13 PROCEDURE — 85652 RBC SED RATE AUTOMATED: CPT | Performed by: COLON & RECTAL SURGERY

## 2020-10-13 PROCEDURE — 80053 COMPREHEN METABOLIC PANEL: CPT | Performed by: COLON & RECTAL SURGERY

## 2020-10-13 PROCEDURE — 85025 COMPLETE CBC W/AUTO DIFF WBC: CPT | Performed by: COLON & RECTAL SURGERY

## 2020-10-13 PROCEDURE — 80202 ASSAY OF VANCOMYCIN: CPT | Performed by: COLON & RECTAL SURGERY

## 2020-10-13 PROCEDURE — 86140 C-REACTIVE PROTEIN: CPT | Performed by: COLON & RECTAL SURGERY

## 2020-10-13 RX ORDER — HYDRALAZINE HYDROCHLORIDE 10 MG/1
30 TABLET, FILM COATED ORAL 3 TIMES DAILY
Qty: 540 TABLET | Refills: 5 | Status: SHIPPED | OUTPATIENT
Start: 2020-10-13 | End: 2020-12-18

## 2020-10-13 RX ORDER — ALLOPURINOL 300 MG/1
300 TABLET ORAL DAILY
Qty: 90 TABLET | Refills: 1 | Status: ON HOLD | OUTPATIENT
Start: 2020-10-13 | End: 2021-05-31

## 2020-10-13 NOTE — PROGRESS NOTES
This is a recent snapshot of the patient's [facility] medical record.  For current drug dose and complete information and questions, call [phone] or In Basket pool, [facility] ([id number])  CSN Number:  [id number]

## 2020-10-13 NOTE — PROGRESS NOTES
ANTICOAGULATION FOLLOW-UP CLINIC VISIT    Patient Name:  Jim Willingham  Date:  10/13/2020  Contact Type:  Telephone    SUBJECTIVE:         OBJECTIVE    Recent labs: (last 7 days)     10/13/20  1320   INR 2.64*       ASSESSMENT / PLAN  INR assessment THER    Recheck INR In: 1 WEEK    INR Location Clinic      Anticoagulation Summary  As of 10/13/2020    INR goal:  2.0-3.0   TTR:  54.0 % (10.9 mo)   INR used for dosin.64 (10/13/2020)   Warfarin maintenance plan:  7.5 mg (5 mg x 1.5) every Sun, Thu; 5 mg (5 mg x 1) all other days   Full warfarin instructions:  7.5 mg every Sun, Thu; 5 mg all other days   Weekly warfarin total:  40 mg   Plan last modified:  Delisa Hillman RN (9/15/2020)   Next INR check:  10/20/2020   Priority:  Critical   Target end date:  Indefinite    Indications    LVAD (left ventricular assist device) present (H) [Z95.811]  Long-term (current) use of anticoagulants [Z79.01] [Z79.01]  Chronic systolic congestive heart failure (H) [I50.22]             Anticoagulation Episode Summary     INR check location:      Preferred lab:      Send INR reminders to:  [facility]    Comments:  +++Patient drawn at Home care visit Q Mon. (8/10/20)+++  Labs drawn either at [facility] or [facility]  LVAD placed 17   II  ASA 81 mg daily      Anticoagulation Care Providers     Provider Role Specialty Phone number    Delisa Montgomery MD Referring Cardiology [phone]            See the Encounter Report to view Anticoagulation Flowsheet and Dosing Calendar (Go to Encounters tab in chart review, and find the Anticoagulation Therapy Visit)  Spoke with patient.  Patient had LVAD placed on:   17  Type of LVAD: HM2  Patient's current Aspirin dose: 81mg  LVAD Protocol followed:  Yes  Delisa Hillman RN

## 2020-10-14 ENCOUNTER — HOME INFUSION (PRE-WILLOW HOME INFUSION) (OUTPATIENT)
Dept: PHARMACY | Facility: CLINIC | Age: 65
End: 2020-10-14

## 2020-10-14 NOTE — PROGRESS NOTES
This is a recent snapshot of the patient's [facility] medical record.  For current drug dose and complete information and questions, call [phone] or In Basket pool, [facility] ([id number])  CSN Number:  [id number]

## 2020-10-15 NOTE — PROGRESS NOTES
This is a recent snapshot of the patient's [facility] medical record.  For current drug dose and complete information and questions, call [phone] or In Basket pool, [facility] ([id number])  CSN Number:  [id number]

## 2020-10-19 ENCOUNTER — HOME INFUSION (PRE-WILLOW HOME INFUSION) (OUTPATIENT)
Dept: PHARMACY | Facility: CLINIC | Age: 65
End: 2020-10-19

## 2020-10-19 ENCOUNTER — DOCUMENTATION ONLY (OUTPATIENT)
Dept: TRANSPLANT | Facility: CLINIC | Age: 65
End: 2020-10-19

## 2020-10-19 NOTE — PROGRESS NOTES
"Performed chart review since meeting with pt 1 mo ago for heart txp eval. Current BMI 34.1. 68\" 224 lbs w/o VAD on 10/7. Pt remains within criteria of <35 for heart txp.     Will continue to follow prn  "

## 2020-10-20 ENCOUNTER — TELEPHONE (OUTPATIENT)
Dept: ANTICOAGULATION | Facility: CLINIC | Age: 65
End: 2020-10-20

## 2020-10-20 ENCOUNTER — HOME INFUSION (PRE-WILLOW HOME INFUSION) (OUTPATIENT)
Dept: PHARMACY | Facility: CLINIC | Age: 65
End: 2020-10-20

## 2020-10-20 ENCOUNTER — MEDICAL CORRESPONDENCE (OUTPATIENT)
Dept: HEALTH INFORMATION MANAGEMENT | Facility: CLINIC | Age: 65
End: 2020-10-20

## 2020-10-20 DIAGNOSIS — I50.22 CHRONIC SYSTOLIC CONGESTIVE HEART FAILURE (H): ICD-10-CM

## 2020-10-20 DIAGNOSIS — Z79.01 LONG TERM CURRENT USE OF ANTICOAGULANT THERAPY: ICD-10-CM

## 2020-10-20 DIAGNOSIS — Z95.811 LVAD (LEFT VENTRICULAR ASSIST DEVICE) PRESENT (H): ICD-10-CM

## 2020-10-20 LAB
ALBUMIN SERPL-MCNC: 3.8 G/DL (ref 3.4–5)
ALP SERPL-CCNC: 119 U/L (ref 40–150)
ALT SERPL W P-5'-P-CCNC: 63 U/L (ref 0–70)
ANION GAP SERPL CALCULATED.3IONS-SCNC: 9 MMOL/L (ref 3–14)
AST SERPL W P-5'-P-CCNC: 54 U/L (ref 0–45)
BASOPHILS # BLD AUTO: 0.1 10E9/L (ref 0–0.2)
BASOPHILS NFR BLD AUTO: 0.8 %
BILIRUB SERPL-MCNC: 0.6 MG/DL (ref 0.2–1.3)
BUN SERPL-MCNC: 35 MG/DL (ref 7–30)
CALCIUM SERPL-MCNC: 8.3 MG/DL (ref 8.5–10.1)
CHLORIDE SERPL-SCNC: 105 MMOL/L (ref 94–109)
CO2 SERPL-SCNC: 25 MMOL/L (ref 20–32)
CREAT SERPL-MCNC: 1.8 MG/DL (ref 0.66–1.25)
CRP SERPL-MCNC: 11 MG/L (ref 0–8)
DIFFERENTIAL METHOD BLD: ABNORMAL
EOSINOPHIL # BLD AUTO: 0 10E9/L (ref 0–0.7)
EOSINOPHIL NFR BLD AUTO: 0.7 %
ERYTHROCYTE [DISTWIDTH] IN BLOOD BY AUTOMATED COUNT: 17.2 % (ref 10–15)
ERYTHROCYTE [SEDIMENTATION RATE] IN BLOOD BY WESTERGREN METHOD: 8 MM/H (ref 0–20)
GFR SERPL CREATININE-BSD FRML MDRD: 39 ML/MIN/{1.73_M2}
GLUCOSE SERPL-MCNC: 95 MG/DL (ref 70–99)
HCT VFR BLD AUTO: 34.6 % (ref 40–53)
HGB BLD-MCNC: 10.2 G/DL (ref 13.3–17.7)
IMM GRANULOCYTES # BLD: 0 10E9/L (ref 0–0.4)
IMM GRANULOCYTES NFR BLD: 0.5 %
INR PPP: 3.01 (ref 0.86–1.14)
LYMPHOCYTES # BLD AUTO: 1.1 10E9/L (ref 0.8–5.3)
LYMPHOCYTES NFR BLD AUTO: 18.4 %
MCH RBC QN AUTO: 26.6 PG (ref 26.5–33)
MCHC RBC AUTO-ENTMCNC: 29.5 G/DL (ref 31.5–36.5)
MCV RBC AUTO: 90 FL (ref 78–100)
MONOCYTES # BLD AUTO: 0.5 10E9/L (ref 0–1.3)
MONOCYTES NFR BLD AUTO: 8 %
NEUTROPHILS # BLD AUTO: 4.2 10E9/L (ref 1.6–8.3)
NEUTROPHILS NFR BLD AUTO: 71.6 %
NRBC # BLD AUTO: 0 10*3/UL
NRBC BLD AUTO-RTO: 0 /100
PLATELET # BLD AUTO: 248 10E9/L (ref 150–450)
POTASSIUM SERPL-SCNC: 4.2 MMOL/L (ref 3.4–5.3)
PROT SERPL-MCNC: 6.5 G/DL (ref 6.8–8.8)
RBC # BLD AUTO: 3.84 10E12/L (ref 4.4–5.9)
SODIUM SERPL-SCNC: 139 MMOL/L (ref 133–144)
VANCOMYCIN SERPL-MCNC: 16.5 MG/L
WBC # BLD AUTO: 5.9 10E9/L (ref 4–11)

## 2020-10-20 PROCEDURE — 85652 RBC SED RATE AUTOMATED: CPT | Performed by: COLON & RECTAL SURGERY

## 2020-10-20 PROCEDURE — 80053 COMPREHEN METABOLIC PANEL: CPT | Performed by: COLON & RECTAL SURGERY

## 2020-10-20 PROCEDURE — 85025 COMPLETE CBC W/AUTO DIFF WBC: CPT | Performed by: COLON & RECTAL SURGERY

## 2020-10-20 PROCEDURE — 80202 ASSAY OF VANCOMYCIN: CPT | Performed by: COLON & RECTAL SURGERY

## 2020-10-20 PROCEDURE — 85610 PROTHROMBIN TIME: CPT | Performed by: COLON & RECTAL SURGERY

## 2020-10-20 PROCEDURE — 86140 C-REACTIVE PROTEIN: CPT | Performed by: COLON & RECTAL SURGERY

## 2020-10-20 NOTE — PROGRESS NOTES
This is a recent snapshot of the patient's [facility] medical record.  For current drug dose and complete information and questions, call [phone] or In Basket pool, [facility] ([id number])  CSN Number:  [id number]

## 2020-10-20 NOTE — TELEPHONE ENCOUNTER
Gil from [facility] called reporting that a home care nurse ricardo and INR and is going to be sent to lab. Nothing is pending in pt's chart, but will follow-up tomorrow if we do not received anything. Will call [facility] with the next INR drawn.

## 2020-10-21 ENCOUNTER — ANTICOAGULATION THERAPY VISIT (OUTPATIENT)
Dept: ANTICOAGULATION | Facility: CLINIC | Age: 65
End: 2020-10-21

## 2020-10-21 ENCOUNTER — HOME INFUSION (PRE-WILLOW HOME INFUSION) (OUTPATIENT)
Dept: PHARMACY | Facility: CLINIC | Age: 65
End: 2020-10-21

## 2020-10-21 ENCOUNTER — TELEPHONE (OUTPATIENT)
Dept: TRANSPLANT | Facility: CLINIC | Age: 65
End: 2020-10-21

## 2020-10-21 DIAGNOSIS — I50.22 CHRONIC SYSTOLIC CONGESTIVE HEART FAILURE (H): ICD-10-CM

## 2020-10-21 DIAGNOSIS — Z79.01 LONG TERM CURRENT USE OF ANTICOAGULANT THERAPY: ICD-10-CM

## 2020-10-21 DIAGNOSIS — Z95.811 LVAD (LEFT VENTRICULAR ASSIST DEVICE) PRESENT (H): ICD-10-CM

## 2020-10-21 NOTE — PROGRESS NOTES
This is a recent snapshot of the patient's [facility] medical record.  For current drug dose and complete information and questions, call [phone] or In Basket pool, [facility] ([id number])  CSN Number:  [id number]

## 2020-10-21 NOTE — PROGRESS NOTES
ANTICOAGULATION FOLLOW-UP CLINIC VISIT    Patient Name:  Jim Willingham  Date:  10/21/2020  Contact Type:  Telephone    SUBJECTIVE:  Patient Findings     Positives:  Change in medications    Comments:  Pt was prescribed PRN Percocet for finger pain. He has been taking this, but has one tablet left.         Clinical Outcomes     Comments:  Pt was prescribed PRN Percocet for finger pain. He has been taking this, but has one tablet left.            OBJECTIVE    Recent labs: (last 7 days)     10/20/20  1305   INR 3.01*       ASSESSMENT / PLAN  INR assessment SUPRA    Recheck INR In: 1 WEEK    INR Location Home[facility] INR      Anticoagulation Summary  As of 10/21/2020    INR goal:  2.0-3.0   TTR:  54.6 % (10.8 mo)   INR used for dosing:  3.01 (10/20/2020)   Warfarin maintenance plan:  7.5 mg (5 mg x 1.5) every Sun, Thu; 5 mg (5 mg x 1) all other days   Full warfarin instructions:  10/21: 2.5 mg; Otherwise 7.5 mg every Sun, Thu; 5 mg all other days   Weekly warfarin total:  40 mg   Plan last modified:  Delisa Hillman RN (9/15/2020)   Next INR check:  10/27/2020   Priority:  Critical   Target end date:  Indefinite    Indications    LVAD (left ventricular assist device) present (H) [Z95.811]  Long-term (current) use of anticoagulants [Z79.01] [Z79.01]  Chronic systolic congestive heart failure (H) [I50.22]             Anticoagulation Episode Summary     INR check location:      Preferred lab:      Send INR reminders to:  [facility]    Comments:  +++Patient drawn at Home care visit Q Mon. (8/10/20)+++  Labs drawn either at [facility] or [facility]  LVAD placed 6/19/17   II  ASA 81 mg daily      Anticoagulation Care Providers     Provider Role Specialty Phone number    Delisa Montgomery MD Referring Cardiology [phone]            See the Encounter Report to view Anticoagulation Flowsheet and Dosing Calendar (Go to Encounters tab in chart review, and find the Anticoagulation Therapy  Visit)    Spoke with patient. Gave them their lab results and new warfarin recommendation.  No changes in health, medication, or diet. No missed doses, no falls. No signs or symptoms of bleed or clotting.     Also spoke with Gil Montes [facility]    Patient had LVAD placed on:   6/19/17  Type of LVAD: HM 2  Patient's current Aspirin dose: ASA 81mg Daily  LVAD Protocol followed: Yes   If Not Followed Explanation:  N/A    Radha Pedro RN

## 2020-10-22 ENCOUNTER — CARE COORDINATION (OUTPATIENT)
Dept: CARDIOLOGY | Facility: CLINIC | Age: 65
End: 2020-10-22

## 2020-10-22 ENCOUNTER — VIRTUAL VISIT (OUTPATIENT)
Dept: INFECTIOUS DISEASES | Facility: CLINIC | Age: 65
End: 2020-10-22
Attending: COLON & RECTAL SURGERY
Payer: COMMERCIAL

## 2020-10-22 ENCOUNTER — HOME INFUSION (PRE-WILLOW HOME INFUSION) (OUTPATIENT)
Dept: PHARMACY | Facility: CLINIC | Age: 65
End: 2020-10-22

## 2020-10-22 ENCOUNTER — TELEPHONE (OUTPATIENT)
Dept: RHEUMATOLOGY | Facility: CLINIC | Age: 65
End: 2020-10-22

## 2020-10-22 ENCOUNTER — MYC MEDICAL ADVICE (OUTPATIENT)
Dept: RHEUMATOLOGY | Facility: CLINIC | Age: 65
End: 2020-10-22

## 2020-10-22 DIAGNOSIS — B95.7 BACTEREMIA DUE TO COAGULASE-NEGATIVE STAPHYLOCOCCUS: ICD-10-CM

## 2020-10-22 DIAGNOSIS — R78.81 BACTEREMIA DUE TO COAGULASE-NEGATIVE STAPHYLOCOCCUS: ICD-10-CM

## 2020-10-22 DIAGNOSIS — T82.7XXD INFECTION AND INFLAMMATORY REACTION DUE TO CARDIAC DEVICE, IMPLANT, AND GRAFT, SUBSEQUENT ENCOUNTER: Primary | ICD-10-CM

## 2020-10-22 DIAGNOSIS — I50.22 CHRONIC SYSTOLIC CONGESTIVE HEART FAILURE (H): Primary | ICD-10-CM

## 2020-10-22 DIAGNOSIS — Z95.811 LVAD (LEFT VENTRICULAR ASSIST DEVICE) PRESENT (H): ICD-10-CM

## 2020-10-22 PROCEDURE — 99213 OFFICE O/P EST LOW 20 MIN: CPT | Mod: TEL | Performed by: COLON & RECTAL SURGERY

## 2020-10-22 RX ORDER — METOLAZONE 2.5 MG/1
2.5 TABLET ORAL PRN
Qty: 5 TABLET | Refills: 0 | Status: ON HOLD | OUTPATIENT
Start: 2020-10-22 | End: 2021-05-31

## 2020-10-22 ASSESSMENT — PAIN SCALES - GENERAL: PAINLEVEL: NO PAIN (0)

## 2020-10-22 NOTE — PROGRESS NOTES
REASON FOR VISIT:  Follow up for CoNS bacteremia in the setting of LVAD.    HISTORY OF PRESENT ILLNESS: Mr. Willingham is a 64 yo male with NICM status post LVAD (destination therapy), HTN, and CKD who I initially met during his hospitalization at [facility] from 9/18-9/29 for management of CoNS bacteremia. He initially presented with myalgias, headache and chills and was later found to have CoNS in 2 separate blood cultures. Although his isolate was oxacillin sensitive, given concern for heteroresistant populations and also for ease of dosing, the decision was made to keep him on vancomycin to complete a treatment course. Of note, imaging during his stay did not demonstrate any evidence of abscess (although CT was non-contrast).     Today, Mr. Willinghma reports doing well. He has not had any recent fevers, chills or sweats. His myalgias have completely resolved. He has not had any issues with vancomycin or his PICC line. 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     ROS: 9 pt ROS obtained, pertinent positives and negatives as above.     PAST MEDICAL/SURGICAL HISTORY:  Past Medical History:   Diagnosis Date     Benign essential hypertension 5/11/2017     Cardiomyopathy, unspecified (H) 5/8/2017     CKD (chronic kidney disease) stage 3, GFR 30-59 ml/min 5/11/2017     Depression 5/11/2017     Diabetes mellitus (H) 5/11/2017     H/O gastric bypass 5/11/2017     ICD (implantable cardioverter-defibrillator), biventricular, in situ 5/11/2017     LVAD (left ventricular assist device) present (H)      NICM (nonischemic cardiomyopathy) (H)/ EF 20% 5/11/2017    ECHO: LVEDd. 7.66 cm, Restrictive pattern , Severe mitral valve regurgitation     CECILIA (obstructive sleep apnea) 5/11/2017     Paroxysmal atrial fibrillation (H) 5/11/2017     Paroxysmal VT (H) 5/11/2017     Uncomplicated asthma      Vitamin B12 deficiency (non anemic) 5/11/2017     Past Surgical History:   Procedure  Laterality Date     ANESTHESIA CARDIOVERSION N/A 5/11/2020    Procedure: ANESTHESIA, FOR CARDIOVERSION @1100;  Surgeon: GENERIC ANESTHESIA PROVIDER;  Location: [facility] OR     CV RIGHT HEART CATH N/A 7/24/2019    Procedure: CV RIGHT HEART CATH;  Surgeon: Renu Sears MD;  Location:  HEART CARDIAC CATH LAB     CV RIGHT HEART CATH N/A 8/5/2020    Procedure: CV RIGHT HEART CATH;  Surgeon: Nicola Seth MD;  Location:  HEART CARDIAC CATH LAB     GI SURGERY  2003    [facility]     INSERT VENTRICULAR ASSIST DEVICE LEFT (HEARTMATE II) N/A 6/19/2017    Procedure: INSERT VENTRICULAR ASSIST DEVICE LEFT (HEARTMATE II);  Median Sternotomy Heartmate II Left Ventricular Assist Device Insertion on Pump Oxygenator;  Surgeon: Ronnie Quigley MD;  Location: [facility] OR     ORTHOPEDIC SURGERY  1994    right knee wired     PICC DOUBLE LUMEN PLACEMENT Right 09/23/2020    5FR PICC DL. Length-43cm (1cm out).       ALLERGIES:    Allergies   Allergen Reactions     Beer Shortness Of Breath     Grass Shortness Of Breath     Ace Inhibitors Cough     Dust Mites Other (See Comments)     Asthma     Mold Other (See Comments)     Asthma     Penicillins Other (See Comments)     Unknown - childhood exposure    Tolerated Zosyn 9/18-9/20/2020     Sulfa Drugs Other (See Comments) and Unknown     Unknown childhood reaction     CURRENT MEDICATIONS:  Reviewed.     CURRENT ANTIMICROBIALS:  Vancomycin, start 9/18    FAMILY/SOCIAL HISTORY: Reviewed and unchanged.     PHYSICAL EXAM:  Pleasant, NAD  Breathing comfortably on RA  Speech fluent and appropriate    PERTINENT LABS:  10/20: CMP remarkable for Cr 1.8 (baseline 1.3-1.5), WBC 5.9, Hgb 10.2, Plts 248, CRP 11 (nl <8)<--12<--5.7    Micro:  Blood cultures  9/17 (1/2) Staph hominis; R clinda, erythromycin, tetracycline  9/18 (1/2) Staph hominis  9/19 (1/1) NGTD  9/20 (1/1) NGTD  9/21 (1/1) NGTD  9/22 (1/1) NGTD    PERTINENT IMAGING:  CT CAP (non-contrast) and abdominal ultrasound with no evidence of abscess.        ASSESSMENT:  Mr. Willingham is a 65 year old male with NICM (EF 20%) s/p HM II LVAD placement (6/19/17) as DT due to obesity, A-fib, CKD 3, and DM2 who developed CoNS bacteremia. This was presumed to be a true infection since 2 separate cultures were positive. Imaging demonstrated some mild fat stranding around his distal driveline but no drainable foci of infection. He has now been on IV vancomycin (chosen for ease of dosing and given possibility of a heterogenous population of CoNS) for ~5 weeks and is clinically doing well.     RECOMMENDATIONS:  - Will complete a 6 week course of IV vancomycin (stop date 10/30).  - Will repeat blood cx x2 ~5-7 days after stopping abx.  PICC can be pulled at that time.   - If he has recurrent CoNS bacteremia, would then plan on lifelong suppression with oral antibiotics.       I will not schedule follow up in the ID clinic at this time but we are happy to see Mr. Willingham back in clinic if new ID issues arise.      Margaret Sinclair MD  [phone]

## 2020-10-22 NOTE — PROGRESS NOTES
"Jim Willingham is a 65 year old male who is being evaluated via a billable telephone visit.      The patient has been notified of following:     \"This telephone visit will be conducted via a call between you and your physician/provider. We have found that certain health care needs can be provided without the need for a physical exam.  This service lets us provide the care you need with a short phone conversation.  If a prescription is necessary we can send it directly to your pharmacy.  If lab work is needed we can place an order for that and you can then stop by our lab to have the test done at a later time.    Telephone visits are billed at different rates depending on your insurance coverage. During this emergency period, for some insurers they may be billed the same as an in-person visit.  Please reach out to your insurance provider with any questions.    If during the course of the call the physician/provider feels a telephone visit is not appropriate, you will not be charged for this service.\"    Patient has given verbal consent for Telephone visit? yes    What phone number would you like to be contacted at? cell    How would you like to obtain your AVS? Mail    Phone call duration: 13 minutes    Margaret Sinclair MD      "

## 2020-10-22 NOTE — PROGRESS NOTES
This is a recent snapshot of the patient's [facility] medical record.  For current drug dose and complete information and questions, call [phone] or In Basket pool, [facility] home infusion ([id number])  CSN Number:  [id number]

## 2020-10-22 NOTE — TELEPHONE ENCOUNTER
Called and spoke with pt, decreased gabapentin in September to 600 mg a day and decreased prednisone.  Was on 20 mg a day, then did 10 mg a day for 10 days, and now is 5 mg a day, decreased a week and a half ago.  Has been on prednisone since then.  Pt increased allopurinol to 300 mg a day.    Pt is c/o numbness and tingling in his hands, started to affect his grasp last week.  He says that when he now grabs things, like his pants, or trying to brush his teeth, he is has sharp stabbing pains in his fingers.  This is when he is trying to manipulate things.  The intensity of numbness and tingling has increased.     Went to urgent care last Friday, they increased increased Gabapentin back to 300-300-600, and gave him 10 tabs of Percocet.  Increasing the Gabapentin seemed to help at first, but he was using the percocet as well.  He states that the pain is somewhat better, but does feel like the stabbing is coming back. Numbness and tingling is increased again.  They provided wrist splints, due to carpal tunnel.  But that is not happening.     2 days ago, pt started having left knee pain, it swollen, and is painful to try to bend.  Knee is warm to touch.  Hard for him to remember where he normally flares his gout.      This seems to be 2 separate issues, a gout flare in the knee and then neuropathy in the hands.  Will send to Dr. Pettit to review and advise.    Sunshine Jc RN  Rheumatology Clinic

## 2020-10-22 NOTE — TELEPHONE ENCOUNTER
----- Message from Elbert Pettit MD sent at 10/21/2020  9:55 AM CDT -----  Regarding: FW: gout pain  Hi Rajani, of course. Thanks for your message.    Sunshine, would you be able to reach out to jim and see if his symptoms sound like acute inflammatory arthritis 2/2 gout vs CTS vs other?    Thanks  Elbert  ----- Message -----  From: Rajani Abdi, RN  Sent: 10/21/2020   9:14 AM CDT  To: Elbert Pettit MD  Subject: gout pain                                        Hi Dr. Pettit,   I am the VAD coordinator for Jim. He called me as he was having trouble getting in touch with your office.   He is having severe pain in his fingers and had to go to Urgent Care a few days ago. Can you or your team give him a call with some suggestions for medication changes? Urgent Care instructed him to increase gabapentin, which has helped but he has also had to take percocet for the pain.   Thank you,   Rajani

## 2020-10-22 NOTE — PROGRESS NOTES
Called pt on 10/21 to check in. Pt reports he is up about 6lbs, at 227lb, from 221lb 1 week ago. He doesn't have any SOB, but does endorse some abdominal distention. He also reports some dietary indiscretion lately. Pt would like to try taking metolazone instead of increasing bumex dose.   Discussed findings with Dr. Montgomery.  MD gave order for one time dose of metolazone 2.5mg, and an additional 40mEq of potassium .   Called pt to review instructions. Pt did not answer, left voicemail with instructions to call back, as well as sent a my chart message.

## 2020-10-22 NOTE — LETTER
"10/22/2020       RE: Jim Willingham  [address] [facility] [address]     Dear Colleague,    Thank you for referring your patient, Jim Willingham, to the [facility] [place] [facility]. Please see a copy of my visit note below.    Jim Willingham is a 65 year old male who is being evaluated via a billable telephone visit.      The patient has been notified of following:     \"This telephone visit will be conducted via a call between you and your physician/provider. We have found that certain health care needs can be provided without the need for a physical exam.  This service lets us provide the care you need with a short phone conversation.  If a prescription is necessary we can send it directly to your pharmacy.  If lab work is needed we can place an order for that and you can then stop by our lab to have the test done at a later time.    Telephone visits are billed at different rates depending on your insurance coverage. During this emergency period, for some insurers they may be billed the same as an in-person visit.  Please reach out to your insurance provider with any questions.    If during the course of the call the physician/provider feels a telephone visit is not appropriate, you will not be charged for this service.\"    Patient has given verbal consent for Telephone visit? yes    What phone number would you like to be contacted at? cell    How would you like to obtain your AVS? Mail    Phone call duration: 13 minutes    Margaret Sinclair MD        REASON FOR VISIT:  Follow up for CoNS bacteremia in the setting of LVAD.    HISTORY OF PRESENT ILLNESS: Mr. Willingham is a 66 yo male with NICM status post LVAD (destination therapy), HTN, and CKD who I initially met during his hospitalization at [facility] from 9/18-9/29 for management of CoNS bacteremia. He initially presented with myalgias, headache and chills and was later found to " have CoNS in 2 separate blood cultures. Although his isolate was oxacillin sensitive, given concern for heteroresistant populations and also for ease of dosing, the decision was made to keep him on vancomycin to complete a treatment course. Of note, imaging during his stay did not demonstrate any evidence of abscess (although CT was non-contrast).     Today, Mr. Willingham reports doing well. He has not had any recent fevers, chills or sweats. His myalgias have completely resolved. He has not had any issues with vancomycin or his PICC line. 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     ROS: 9 pt ROS obtained, pertinent positives and negatives as above.     PAST MEDICAL/SURGICAL HISTORY:  Past Medical History:   Diagnosis Date     Benign essential hypertension 5/11/2017     Cardiomyopathy, unspecified (H) 5/8/2017     CKD (chronic kidney disease) stage 3, GFR 30-59 ml/min 5/11/2017     Depression 5/11/2017     Diabetes mellitus (H) 5/11/2017     H/O gastric bypass 5/11/2017     ICD (implantable cardioverter-defibrillator), biventricular, in situ 5/11/2017     LVAD (left ventricular assist device) present (H)      NICM (nonischemic cardiomyopathy) (H)/ EF 20% 5/11/2017    ECHO: LVEDd. 7.66 cm, Restrictive pattern , Severe mitral valve regurgitation     CECILIA (obstructive sleep apnea) 5/11/2017     Paroxysmal atrial fibrillation (H) 5/11/2017     Paroxysmal VT (H) 5/11/2017     Uncomplicated asthma      Vitamin B12 deficiency (non anemic) 5/11/2017     Past Surgical History:   Procedure Laterality Date     ANESTHESIA CARDIOVERSION N/A 5/11/2020    Procedure: ANESTHESIA, FOR CARDIOVERSION @1100;  Surgeon: GENERIC ANESTHESIA PROVIDER;  Location: [facility] OR     CV RIGHT HEART CATH N/A 7/24/2019    Procedure: CV RIGHT HEART CATH;  Surgeon: Renu Sears MD;  Location: [facility] HEART CARDIAC CATH LAB     CV RIGHT HEART CATH N/A 8/5/2020    Procedure: CV RIGHT HEART CATH;   Surgeon: Nicola Seth MD;  Location:  HEART CARDIAC CATH LAB     GI SURGERY  2003    [place] [place]     INSERT VENTRICULAR ASSIST DEVICE LEFT (HEARTMATE II) N/A 6/19/2017    Procedure: INSERT VENTRICULAR ASSIST DEVICE LEFT (HEARTMATE II);  Median Sternotomy Heartmate II Left Ventricular Assist Device Insertion on Pump Oxygenator;  Surgeon: Ronnie Quigley MD;  Location:  OR     ORTHOPEDIC SURGERY  1994    right knee wired     PICC DOUBLE LUMEN PLACEMENT Right 09/23/2020    5FR PICC DL. Length-43cm (1cm out).       ALLERGIES:    Allergies   Allergen Reactions     Beer Shortness Of Breath     Grass Shortness Of Breath     Ace Inhibitors Cough     Dust Mites Other (See Comments)     Asthma     Mold Other (See Comments)     Asthma     Penicillins Other (See Comments)     Unknown - childhood exposure    Tolerated Zosyn 9/18-9/20/2020     Sulfa Drugs Other (See Comments) and Unknown     Unknown childhood reaction     CURRENT MEDICATIONS:  Reviewed.     CURRENT ANTIMICROBIALS:  Vancomycin, start 9/18    FAMILY/SOCIAL HISTORY: Reviewed and unchanged.     PHYSICAL EXAM:  Pleasant, NAD  Breathing comfortably on RA  Speech fluent and appropriate    PERTINENT LABS:  10/20: CMP remarkable for Cr 1.8 (baseline 1.3-1.5), WBC 5.9, Hgb 10.2, Plts 248, CRP 11 (nl <8)<--12<--5.7    Micro:  Blood cultures  9/17 (1/2) Staph hominis; R clinda, erythromycin, tetracycline  9/18 (1/2) Staph hominis  9/19 (1/1) NGTD  9/20 (1/1) NGTD  9/21 (1/1) NGTD  9/22 (1/1) NGTD    PERTINENT IMAGING:  CT CAP (non-contrast) and abdominal ultrasound with no evidence of abscess.       ASSESSMENT:  Mr. Willingham is a 65 year old male with NICM (EF 20%) s/p HM II LVAD placement (6/19/17) as DT due to obesity, A-fib, CKD 3, and DM2 who developed CoNS bacteremia. This was presumed to be a true infection since 2 separate cultures were positive. Imaging demonstrated some mild fat stranding around his distal driveline but no drainable foci of infection. He has now been  on IV vancomycin (chosen for ease of dosing and given possibility of a heterogenous population of CoNS) for ~5 weeks and is clinically doing well.     RECOMMENDATIONS:  - Will complete a 6 week course of IV vancomycin (stop date 10/30).  - Will repeat blood cx x2 ~5-7 days after stopping abx.  PICC can be pulled at that time.   - If he has recurrent CoNS bacteremia, would then plan on lifelong suppression with oral antibiotics.       I will not schedule follow up in the ID clinic at this time but we are happy to see Mr. Willingham back in clinic if new ID issues arise.      Margaret Sinclair MD  [phone]

## 2020-10-23 DIAGNOSIS — M1A.3790 CHRONIC GOUT DUE TO RENAL IMPAIRMENT INVOLVING FOOT WITHOUT TOPHUS, UNSPECIFIED LATERALITY: Primary | ICD-10-CM

## 2020-10-23 DIAGNOSIS — M10.369 ACUTE GOUT DUE TO RENAL IMPAIRMENT INVOLVING KNEE, UNSPECIFIED LATERALITY: ICD-10-CM

## 2020-10-23 RX ORDER — PREDNISONE 10 MG/1
TABLET ORAL
Qty: 18 TABLET | Refills: 1 | Status: SHIPPED | OUTPATIENT
Start: 2020-10-23 | End: 2020-11-01

## 2020-10-23 NOTE — TELEPHONE ENCOUNTER
"Transplant Update 10/21/20:  Topic:  Transplant Evaluation    Spoke with patient to check in and see how he is feeling since being hospitalized.  Reviewed plan for rescheduling PFTs and 6MW.  He was hospitalized and was unable to do these tests for his evaluation.  Patient prefers to have these done once he is feeling \"stronger\" as he recovers from his infection.  He is going to call coordinator to schedule when ready.  He understands that he will not be able to be listed without having these done.    Also discussed THC, he has been negative x2.  He denies smoking, he is willing to go in at any time to be tested.  He reports having been exposed via second hand smoke.    Weight is down, he has made progress and BMI is within acceptable limit for tx.      No further questions at this time. Will call coordinator when able to complete testing.                        "

## 2020-10-23 NOTE — TELEPHONE ENCOUNTER
Elbert Pettit MD Beard, Madeline, RN   Caller: Unspecified (Yesterday,  5:02 PM)             Thanks, Sunshine. I agree.     Would recommend 30mg prednisone x3 days, then 20mg x3 days, then 10mg x3 days then back to baseline of 5mg daily. Will put in script now. If his wrist pain is due to CTS, this should also provide some acute relief....     Called and spoke with pt, he is in agreement with plan.  Thinks the CTS is why he was on prednisone 20 mg a day for so long.  He will start the prednisone today and let us know if things do not get better.    Sunshine Jc RN  Rheumatology Clinic

## 2020-10-23 NOTE — TELEPHONE ENCOUNTER
Please see phone encounter from 10/22/2020 for resolution of this matter.    Sunshine Jc RN  Rheumatology Clinic

## 2020-10-26 DIAGNOSIS — I50.22 CHRONIC SYSTOLIC CONGESTIVE HEART FAILURE (H): ICD-10-CM

## 2020-10-26 DIAGNOSIS — Z95.811 LVAD (LEFT VENTRICULAR ASSIST DEVICE) PRESENT (H): ICD-10-CM

## 2020-10-26 NOTE — PROGRESS NOTES
This is a recent snapshot of the patient's [facility] medical record.  For current drug dose and complete information and questions, call [phone] or In Basket pool, [facility]v home infusion ([id number])  CSN Number:  [id number]

## 2020-10-27 ENCOUNTER — HOME INFUSION (PRE-WILLOW HOME INFUSION) (OUTPATIENT)
Dept: PHARMACY | Facility: CLINIC | Age: 65
End: 2020-10-27

## 2020-10-27 ENCOUNTER — CARE COORDINATION (OUTPATIENT)
Dept: CARDIOLOGY | Facility: CLINIC | Age: 65
End: 2020-10-27

## 2020-10-27 ENCOUNTER — MEDICAL CORRESPONDENCE (OUTPATIENT)
Dept: HEALTH INFORMATION MANAGEMENT | Facility: CLINIC | Age: 65
End: 2020-10-27

## 2020-10-27 ENCOUNTER — ANTICOAGULATION THERAPY VISIT (OUTPATIENT)
Dept: ANTICOAGULATION | Facility: CLINIC | Age: 65
End: 2020-10-27

## 2020-10-27 DIAGNOSIS — I50.22 CHRONIC SYSTOLIC CONGESTIVE HEART FAILURE (H): ICD-10-CM

## 2020-10-27 DIAGNOSIS — Z95.811 LVAD (LEFT VENTRICULAR ASSIST DEVICE) PRESENT (H): ICD-10-CM

## 2020-10-27 DIAGNOSIS — I50.22 CHRONIC SYSTOLIC CONGESTIVE HEART FAILURE (H): Primary | ICD-10-CM

## 2020-10-27 DIAGNOSIS — Z79.01 LONG TERM CURRENT USE OF ANTICOAGULANT THERAPY: ICD-10-CM

## 2020-10-27 LAB
ALBUMIN SERPL-MCNC: 3.9 G/DL (ref 3.4–5)
ALP SERPL-CCNC: 113 U/L (ref 40–150)
ALT SERPL W P-5'-P-CCNC: 66 U/L (ref 0–70)
ANION GAP SERPL CALCULATED.3IONS-SCNC: 7 MMOL/L (ref 3–14)
AST SERPL W P-5'-P-CCNC: 40 U/L (ref 0–45)
BASOPHILS # BLD AUTO: 0 10E9/L (ref 0–0.2)
BASOPHILS NFR BLD AUTO: 0.7 %
BILIRUB SERPL-MCNC: 0.6 MG/DL (ref 0.2–1.3)
BUN SERPL-MCNC: 51 MG/DL (ref 7–30)
CALCIUM SERPL-MCNC: 8.4 MG/DL (ref 8.5–10.1)
CHLORIDE SERPL-SCNC: 99 MMOL/L (ref 94–109)
CO2 SERPL-SCNC: 28 MMOL/L (ref 20–32)
CREAT SERPL-MCNC: 2.04 MG/DL (ref 0.66–1.25)
CRP SERPL-MCNC: 3.5 MG/L (ref 0–8)
DIFFERENTIAL METHOD BLD: ABNORMAL
EOSINOPHIL # BLD AUTO: 0.1 10E9/L (ref 0–0.7)
EOSINOPHIL NFR BLD AUTO: 1.4 %
ERYTHROCYTE [DISTWIDTH] IN BLOOD BY AUTOMATED COUNT: 17.2 % (ref 10–15)
ERYTHROCYTE [SEDIMENTATION RATE] IN BLOOD BY WESTERGREN METHOD: 10 MM/H (ref 0–20)
GFR SERPL CREATININE-BSD FRML MDRD: 34 ML/MIN/{1.73_M2}
GLUCOSE SERPL-MCNC: 211 MG/DL (ref 70–99)
HCT VFR BLD AUTO: 33.6 % (ref 40–53)
HGB BLD-MCNC: 10.3 G/DL (ref 13.3–17.7)
IMM GRANULOCYTES # BLD: 0 10E9/L (ref 0–0.4)
IMM GRANULOCYTES NFR BLD: 0.5 %
INR PPP: 1.84 (ref 0.86–1.14)
LYMPHOCYTES # BLD AUTO: 0.4 10E9/L (ref 0.8–5.3)
LYMPHOCYTES NFR BLD AUTO: 6.8 %
MCH RBC QN AUTO: 26.7 PG (ref 26.5–33)
MCHC RBC AUTO-ENTMCNC: 30.7 G/DL (ref 31.5–36.5)
MCV RBC AUTO: 87 FL (ref 78–100)
MONOCYTES # BLD AUTO: 0.2 10E9/L (ref 0–1.3)
MONOCYTES NFR BLD AUTO: 4.1 %
NEUTROPHILS # BLD AUTO: 4.8 10E9/L (ref 1.6–8.3)
NEUTROPHILS NFR BLD AUTO: 86.5 %
NRBC # BLD AUTO: 0 10*3/UL
NRBC BLD AUTO-RTO: 0 /100
PLATELET # BLD AUTO: 280 10E9/L (ref 150–450)
POTASSIUM SERPL-SCNC: 4.1 MMOL/L (ref 3.4–5.3)
PROT SERPL-MCNC: 7.1 G/DL (ref 6.8–8.8)
RBC # BLD AUTO: 3.86 10E12/L (ref 4.4–5.9)
SODIUM SERPL-SCNC: 134 MMOL/L (ref 133–144)
VANCOMYCIN SERPL-MCNC: 19.3 MG/L
WBC # BLD AUTO: 5.6 10E9/L (ref 4–11)

## 2020-10-27 PROCEDURE — 80202 ASSAY OF VANCOMYCIN: CPT | Mod: GT | Performed by: INTERNAL MEDICINE

## 2020-10-27 PROCEDURE — 85610 PROTHROMBIN TIME: CPT | Mod: GT | Performed by: INTERNAL MEDICINE

## 2020-10-27 PROCEDURE — 85652 RBC SED RATE AUTOMATED: CPT | Mod: GT | Performed by: INTERNAL MEDICINE

## 2020-10-27 PROCEDURE — 86140 C-REACTIVE PROTEIN: CPT | Mod: GT | Performed by: INTERNAL MEDICINE

## 2020-10-27 PROCEDURE — 80053 COMPREHEN METABOLIC PANEL: CPT | Mod: GT | Performed by: INTERNAL MEDICINE

## 2020-10-27 PROCEDURE — 85025 COMPLETE CBC W/AUTO DIFF WBC: CPT | Mod: GT | Performed by: INTERNAL MEDICINE

## 2020-10-27 NOTE — PROGRESS NOTES
ANTICOAGULATION FOLLOW-UP CLINIC VISIT    Patient Name:  Jim Willingham  Date:  10/27/2020  Contact Type:  Telephone    SUBJECTIVE:  Patient Findings     Positives:  Change in health, Change in medications, Change in diet/appetite    Comments:  Pt reports he only had 3 stalks of asparagus this week. Pt also reports he had a gout flare up and his Prednisone was increased for 3 days and now is tapering down. Just finished 30mg daily and will be starting 20mg daily for 3 days.         Clinical Outcomes     Comments:  Pt reports he only had 3 stalks of asparagus this week. Pt also reports he had a gout flare up and his Prednisone was increased for 3 days and now is tapering down. Just finished 30mg daily and will be starting 20mg daily for 3 days.            OBJECTIVE    Recent labs: (last 7 days)     10/27/20  1250   INR 1.84*       ASSESSMENT / PLAN  INR assessment SUB    Recheck INR In: 3 DAYS    INR Location Clinic      Anticoagulation Summary  As of 10/27/2020    INR goal:  2.0-3.0   TTR:  56.3 % (10.9 mo)   INR used for dosin.84 (10/27/2020)   Warfarin maintenance plan:  7.5 mg (5 mg x 1.5) every Sun, Thu; 5 mg (5 mg x 1) all other days   Full warfarin instructions:  10/27: 7.5 mg; Otherwise 7.5 mg every Sun, Thu; 5 mg all other days   Weekly warfarin total:  40 mg   Plan last modified:  Delisa Hillman RN (9/15/2020)   Next INR check:  10/30/2020   Priority:  Critical   Target end date:  Indefinite    Indications    LVAD (left ventricular assist device) present (H) [Z95.811]  Long-term (current) use of anticoagulants [Z79.01] [Z79.01]  Chronic systolic congestive heart failure (H) [I50.22]             Anticoagulation Episode Summary     INR check location:      Preferred lab:      Send INR reminders to:  [facility]    Comments:  +++Patient drawn at Home care visit Q Mon. (8/10/20)+++  Labs drawn either at [facility] or [facility]  LVAD placed 17   II  ASA 81 mg daily       Anticoagulation Care Providers     Provider Role Specialty Phone number    Delisa Montgomery MD Referring Cardiology [phone]            See the Encounter Report to view Anticoagulation Flowsheet and Dosing Calendar (Go to Encounters tab in chart review, and find the Anticoagulation Therapy Visit)    Spoke with patient. Gave them their lab results and new warfarin recommendation.  No changes in health, medication, or diet. No missed doses, no falls. No signs or symptoms of bleed or clotting.     Patient had LVAD placed on:   6/19/17  Type of LVAD: HM 2  Patient's current Aspirin dose: ASA 81mg Daily  LVAD Protocol followed: Yes   If Not Followed Explanation:  N/A    Radha Pedro RN                  patient arrived to ED today with c/o dizziness, palpitations, blurry vision, and headache since yesterday.

## 2020-10-27 NOTE — PROGRESS NOTES
Called pt on 10/27 to follow-up on weight changes and metolazone dose from last week. Pt reports he took the metolazone on 10/22 and his weight had increased up to 233lb by that time. Weight has come down to 228lb, but is still above his estimated dry weight of 220lb. He reports breathing is improved since taking metolazone but does still have some abdominal distention. Pt is taking bumex 2mg in the am and 1mg in the afternoon. Pt had CMP drawn today and creat is elevated to 2.  Discussed findings with Dr. Montgomery. MD instructed to increase bumex to 2mg BID. Will recheck labs in 1 week.   Called pt to review plan. Pt verbalized understanding. His weight today (10/29) is 226lb.

## 2020-10-28 ENCOUNTER — HOME INFUSION (PRE-WILLOW HOME INFUSION) (OUTPATIENT)
Dept: PHARMACY | Facility: CLINIC | Age: 65
End: 2020-10-28

## 2020-10-28 NOTE — PROGRESS NOTES
This is a recent snapshot of the patient's [facility] medical record.  For current drug dose and complete information and questions, call [phone] or In Basket pool, [facility]v home infusion ([id number])  CSN Number:  [id number]

## 2020-10-29 ENCOUNTER — TELEPHONE (OUTPATIENT)
Dept: RHEUMATOLOGY | Facility: CLINIC | Age: 65
End: 2020-10-29

## 2020-10-29 ENCOUNTER — NURSE TRIAGE (OUTPATIENT)
Dept: NURSING | Facility: CLINIC | Age: 65
End: 2020-10-29

## 2020-10-29 RX ORDER — METOLAZONE 2.5 MG/1
2.5 TABLET ORAL PRN
Qty: 5 TABLET | Refills: 0 | OUTPATIENT
Start: 2020-10-29

## 2020-10-29 NOTE — TELEPHONE ENCOUNTER
metolazone (ZAROXOLYN) 2.5 MG tablet  Called and left a message for medication of dated ordered and left a detailed order on phone if they needed it at that time.    Clara Quiroz RN  Central Triage Red Flags/Med Refills    metolazone (ZAROXOLYN) 2.5 MG tablet 5 tablet 0 10/22/2020  --   Sig - Route: Take 1 tablet (2.5 mg) by mouth as needed (take ONLY as directed by VAD team) - Oral   Sent to pharmacy as: metOLazone 2.5 MG Oral Tablet (ZAROXOLYN)   Class: E-Prescribe   Order: [id number]   E-Prescribing Status: Receipt confirmed by pharmacy (10/22/2020  4:35 PM CDT)   Printout Tracking    External Result Report   Pharmacy    [id number] [id number] AT [facility]  [address]         Clara Quiroz RN  Central Triage Red Flags/Med Refills

## 2020-10-29 NOTE — TELEPHONE ENCOUNTER
metolazone (ZAROXOLYN) 2.5 MG tablet 5 tablet 0 10/22/2020  --   Sig - Route: Take 1 tablet (2.5 mg) by mouth as needed (take ONLY as directed by VAD team) - Oral   Sent to pharmacy as: metOLazone 2.5 MG Oral Tablet (ZAROXOLYN)   Class: E-Prescribe   Order: [id number]   E-Prescribing Status: Receipt confirmed by pharmacy (10/22/2020  4:35 PM CDT)   Printout Tracking    External Result Report   Pharmacy    [facility] #[id number] [place] [id number] AT [facility]  [address]   Medication was received and Pt picked up the medication on 10/22/2020    No further action needed.    Clara Quiroz RN  Central Triage Red Flags/Med Refills

## 2020-10-29 NOTE — TELEPHONE ENCOUNTER
Pt is on his last day of 20 mg a day and the gout flare, he is having pain in his left knee to his left ankle.  He is very uncomfortable. He is wondering what he should do at this time?    Will send a message to Dr. Mathews to review and advise.    Sunshine Jc RN  Rheumatology Clinic

## 2020-10-29 NOTE — PROGRESS NOTES
This is a recent snapshot of the patient's [facility] medical record.  For current drug dose and complete information and questions, call [phone] or In Basket pool, [facility] home infusion ([id number])  CSN Number:  [id number]

## 2020-10-30 ENCOUNTER — ANTICOAGULATION THERAPY VISIT (OUTPATIENT)
Dept: ANTICOAGULATION | Facility: CLINIC | Age: 65
End: 2020-10-30

## 2020-10-30 ENCOUNTER — VIRTUAL VISIT (OUTPATIENT)
Dept: PALLIATIVE CARE | Facility: CLINIC | Age: 65
End: 2020-10-30
Attending: INTERNAL MEDICINE
Payer: COMMERCIAL

## 2020-10-30 DIAGNOSIS — Z71.89 ADVANCE CARE PLANNING: Primary | ICD-10-CM

## 2020-10-30 DIAGNOSIS — Z95.811 LVAD (LEFT VENTRICULAR ASSIST DEVICE) PRESENT (H): ICD-10-CM

## 2020-10-30 DIAGNOSIS — Z79.01 LONG TERM CURRENT USE OF ANTICOAGULANT THERAPY: ICD-10-CM

## 2020-10-30 DIAGNOSIS — I50.22 CHRONIC SYSTOLIC CONGESTIVE HEART FAILURE (H): ICD-10-CM

## 2020-10-30 DIAGNOSIS — I42.8 OTHER CARDIOMYOPATHY (H): ICD-10-CM

## 2020-10-30 LAB — INR PPP: 1.84 (ref 0.86–1.14)

## 2020-10-30 PROCEDURE — 999N001193 HC VIDEO/TELEPHONE VISIT; NO CHARGE

## 2020-10-30 PROCEDURE — 36415 COLL VENOUS BLD VENIPUNCTURE: CPT | Performed by: INTERNAL MEDICINE

## 2020-10-30 PROCEDURE — 85610 PROTHROMBIN TIME: CPT | Performed by: INTERNAL MEDICINE

## 2020-10-30 PROCEDURE — 99214 OFFICE O/P EST MOD 30 MIN: CPT | Mod: GT | Performed by: INTERNAL MEDICINE

## 2020-10-30 NOTE — PROGRESS NOTES
11/3/20 Changed calendar to .  Jim has an appt with labs then.  REGINALD Emerson        ANTICOAGULATION FOLLOW-UP CLINIC VISIT    Patient Name:  Jim Willingham  Date:  10/30/2020  Contact Type:  Telephone    SUBJECTIVE:  Patient Findings     Positives:  Change in medications    Comments:  Pt's Prednisone is down to 10mg TID. Pt is scheduled to be seen with [facility] on Tuesday 11/3 and will get an INR drawn then.         Clinical Outcomes     Comments:  Pt's Prednisone is down to 10mg TID. Pt is scheduled to be seen with [facility] on Tuesday 11/3 and will get an INR drawn then.            OBJECTIVE    Recent labs: (last 7 days)     10/30/20  0939   INR 1.84*       ASSESSMENT / PLAN  INR assessment SUB    Recheck INR In: 4 DAYS    INR Location Clinic      Anticoagulation Summary  As of 10/30/2020    INR goal:  2.0-3.0   TTR:  56.0 % (10.9 mo)   INR used for dosin.84 (10/30/2020)   Warfarin maintenance plan:  7.5 mg (5 mg x 1.5) every Sun, Thu; 5 mg (5 mg x 1) all other days   Full warfarin instructions:  10/30: 7.5 mg; 10/31: 7.5 mg; Otherwise 7.5 mg every Sun, Thu; 5 mg all other days   Weekly warfarin total:  40 mg   Plan last modified:  Delisa Hillman RN (9/15/2020)   Next INR check:  11/3/2020   Priority:  Critical   Target end date:  Indefinite    Indications    LVAD (left ventricular assist device) present (H) [Z95.811]  Long-term (current) use of anticoagulants [Z79.01] [Z79.01]  Chronic systolic congestive heart failure (H) [I50.22]             Anticoagulation Episode Summary     INR check location:      Preferred lab:      Send INR reminders to:  [facility]    Comments:  +++Patient drawn at Home care visit Q Mon. (8/10/20)+++  Labs drawn either at [facility] or [facility]  LVAD placed 17   II  ASA 81 mg daily      Anticoagulation Care Providers     Provider Role Specialty Phone number    Delisa Montgomery MD Referring Advanced Heart Failure  and Transplant Cardiology [phone]            See the Encounter Report to view Anticoagulation Flowsheet and Dosing Calendar (Go to Encounters tab in chart review, and find the Anticoagulation Therapy Visit)    Spoke with patient. Gave them their lab results and new warfarin recommendation.  No changes in health, medication, or diet. No missed doses, no falls. No signs or symptoms of bleed or clotting.     Patient had LVAD placed on:   6/19/17  Type of LVAD: HM 2  Patient's current Aspirin dose: ASA 81mg Daily  LVAD Protocol followed: No   If Not Followed Explanation:  Pt scheduled for labs to be drawn on Tuesday 11/3 with [facility]    Radha Pedro RN

## 2020-10-30 NOTE — TELEPHONE ENCOUNTER
Elbert Pettit MD Beard, Madeline, RN   Caller: Unspecified (Yesterday,  2:08 PM)             Oh okay, I would continue with the original taper plan in that case and not increase/ prolong like I outlined in this message. I'm on this weekend so if something comes up he is free to call...      Called and updated pt, he will continue to monitor joint.  He will try icing if it is painful, for up to 20 minutes at a time, no more than 4-5 times a day.  If things get too bad, provided the number to the on call rheumatologist.    Sunshine Jc RN  Rheumatology Clinic

## 2020-10-30 NOTE — PROGRESS NOTES
"Jim Willingham is a 65 year old male who is being evaluated via a billable video visit.      The patient has been notified of following:     \"This video visit will be conducted via a call between you and your physician/provider. We have found that certain health care needs can be provided without the need for an in-person physical exam.  This service lets us provide the care you need with a video conversation.  If a prescription is necessary we can send it directly to your pharmacy.  If lab work is needed we can place an order for that and you can then stop by our lab to have the test done at a later time.    Video visits are billed at different rates depending on your insurance coverage.  Please reach out to your insurance provider with any questions.    If during the course of the call the physician/provider feels a video visit is not appropriate, you will not be charged for this service.\"    Patient has given verbal consent for Video visit? Yes  How would you like to obtain your AVS? MyChart  If you are dropped from the video visit, the video invite should be resent to: Text to cell phone: [phone]  Will anyone else be joining your video visit? No      [facility]    Palliative Care Outpatient Clinic    (This note was transcribed using voice recognition software. While I review and edit the transcription, I may miss errors, and the software sometimes does unexpected capitalizations and formatting that I miss. Please let me know of any serious mistranscriptions and I will addend this note.)    Patient ID:  He has NICM s/p HM II LVAD 2017 as DT. Subsequently lost weight and is being evaluated for transplantation. Course complicated by CKD, drive line infection, gout    +HCD on chart, names sister Arielle as his agent; provides many details about his wishes in his HCD    We met inpatient 5/2017    History:   We reviewed the role of pal care for heart transplant patients.  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     Heart transplantation preparedness plan    Patient s legally designated health care agent: sister. He'd want his wife involved as she's an RT and has medical knowledge too    Patient s personal goals/hopes for receiving the transplant: He talks of needing to be around, active for his great granddtr who he has adopted and is raising. She is 6 yo \"I need to stay around until she's 20 - she's the main reason I'm doing this.\"    Patient s worries/concerns when considering receiving the transplant: Not recovering, as below    Patient s thoughts about what constitutes a  good  quality of life: Being independent, active, able to raise his great granddtr; being able to communicate, not depend on others    Patient s thoughts about what health conditions would be unacceptable (situations the patient would want her/his doctors and loved ones to know that she/he would not want life prolonged)? Prolonged/permanent state of not being able to communicate. Prolonged dependence on others to take care of his body. He can imagine finding it acceptable to have some permanent disabilities and still finding life acceptable, but specifically mentions not being able to communicate and being dependent on others for toilet care as not being acceptable    Heart transplantation carries a small risk of perioperative stroke/brain injury, which however can be devastating. After a stroke, what sort of functional outcomes would be acceptable vs unacceptable to the patient? As above     Chronic mechanical ventilation via a tracheostomy tube is a risk. What are the circumstances the patient would want her/his physicians and family to keep them alive with long-term mechanical ventilation vs allow them to die?  He would accept a trach, and talked about seeing patients eventually have reasonable recoveries after needing long term ventilation/trach. He also says if he was " dependent on ventilation long term and his doctors felt there was no/minimal chance of returning to an acceptable life, he would not want to be prolonged like that, it would be ok to allow him to die    SH: As above. Lives with wife and great granddtr who they are doing at home schooling with right now    ROS: 5 syst ros ow neg    PE: There were no vitals taken for this visit.   Wt Readings from Last 3 Encounters:   10/07/20 101.6 kg (224 lb)   10/02/20 100.7 kg (222 lb)   09/27/20 100.2 kg (221 lb)     Gen alert, comfortable appearing, NAD.   Head NCAT.  Eyes anicteric without injection  Face symmetric, eyes conjugate  Mouth pink, moist appearing  Lungs unlabored, no cough, speaking full sentences  Skin no rashes or lesions evident on face/neck  Neuro Face symmetric, eyes conjugate; speech fluent.  Neuropsych exam normal including affect, sensorium, gross memory, thought processes, and fund of knowledge.     Data reviewed:  I reviewed recent labs and imaging, my comments:  Cr 2  Na 134    Imaging unremarkable    Impression & Recommendations:  66 yo man with NICM s/p LVAD, being evaluated for heart transplant    Extensive, specific, transplant related ACP as summarized above.  Encouraged him to make sure his sister and wife knows these wishes (he thinks his wife does, but wants to make sure his sister does too) which he reports he was planning on doing anyway    F/u prn    Chart data reviewed today:    Family History   Problem Relation Age of Onset     Cerebrovascular Disease Mother      Diabetes Mother      Hypertension Mother      Coronary Artery Disease Father      Diabetes Type 2  Father      Past Medical History:   Diagnosis Date     Benign essential hypertension 5/11/2017     Cardiomyopathy, unspecified (H) 5/8/2017     CKD (chronic kidney disease) stage 3, GFR 30-59 ml/min 5/11/2017     Depression 5/11/2017     Diabetes mellitus (H) 5/11/2017     H/O gastric bypass 5/11/2017     ICD (implantable  cardioverter-defibrillator), biventricular, in situ 5/11/2017     LVAD (left ventricular assist device) present (H)      NICM (nonischemic cardiomyopathy) (H)/ EF 20% 5/11/2017    ECHO: LVEDd. 7.66 cm, Restrictive pattern , Severe mitral valve regurgitation     CECILIA (obstructive sleep apnea) 5/11/2017     Paroxysmal atrial fibrillation (H) 5/11/2017     Paroxysmal VT (H) 5/11/2017     Uncomplicated asthma      Vitamin B12 deficiency (non anemic) 5/11/2017     Past Surgical History:   Procedure Laterality Date     ANESTHESIA CARDIOVERSION N/A 5/11/2020    Procedure: ANESTHESIA, FOR CARDIOVERSION @1100;  Surgeon: GENERIC ANESTHESIA PROVIDER;  Location: [facility] OR     CV RIGHT HEART CATH N/A 7/24/2019    Procedure: CV RIGHT HEART CATH;  Surgeon: Renu Sears MD;  Location:  HEART CARDIAC CATH LAB     CV RIGHT HEART CATH N/A 8/5/2020    Procedure: CV RIGHT HEART CATH;  Surgeon: Nicola Seth MD;  Location:  HEART CARDIAC CATH LAB     GI SURGERY  2003    [facility]     INSERT VENTRICULAR ASSIST DEVICE LEFT (HEARTMATE II) N/A 6/19/2017    Procedure: INSERT VENTRICULAR ASSIST DEVICE LEFT (HEARTMATE II);  Median Sternotomy Heartmate II Left Ventricular Assist Device Insertion on Pump Oxygenator;  Surgeon: Ronnie Quigley MD;  Location:  OR     ORTHOPEDIC SURGERY  1994    right knee wired     PICC DOUBLE LUMEN PLACEMENT Right 09/23/2020    5FR PICC DL. Length-43cm (1cm out).     Allergies   Allergen Reactions     Beer Shortness Of Breath     Grass Shortness Of Breath     Ace Inhibitors Cough     Dust Mites Other (See Comments)     Asthma     Mold Other (See Comments)     Asthma     Penicillins Other (See Comments)     Unknown - childhood exposure    Tolerated Zosyn 9/18-9/20/2020     Sulfa Drugs Other (See Comments) and Unknown     Unknown childhood reaction       Medications: I have reviewed the patient's medication profile.  MNPMP:     At least 15 min of today's visit discussing aCP as above  Thank you for involving  us in the patient's care.   Forest Gonzalez MD / Palliative Medicine / Katlyn dejesus via [facility].          Video-Visit Details    Type of service:  Video Visit    Video Start Time: 0803  Video End Time: 0830    Originating Location (pt. Location): Home    Distant Location (provider location):  I am at home    Platform used for Video Visit: Giacomo

## 2020-10-30 NOTE — TELEPHONE ENCOUNTER
Elbert Pettit MD Beard, Madeline, RN   Caller: Unspecified (Yesterday,  2:08 PM)             Does he have red/ hot/ swollen joints on 20 or when he goes below 20?     If on 20 and developing red/hot/swollen joints, should go up to 30mg again. We can do a longer taper of 30mg daily x5 days, then 25mg daily x5 days, then 20mg daily x5 days, then 15mg daily x5 days, then 10mg daily x 5 days, then down to 5 thereafter until follow-up     If developing red/hot/swollen joints only after going below 20, then should start the above plan, but at the 20mg dose (ie restart 5days of 20mg then continue with plan above.)     If not red/hot/swollen joints then I need to talk to him to figure out why he is having this pain...     Thanks   Elbert      Called and spoke with pt, he has swelling and soreness, but not redness and no heat in joint.  Today was the first day of 10 mg prednisone and so far he has not noticed any difference in swelling.  Overall he says he has not done a lot on it today, but it feels a little better but it is still pretty painful overall.  He has not been doing a lot today.  He is concerned that when he gets up moving around what is going to happen.     Slightly better pain level today, pain level today 3-4, yesterday was 8.     Sunshine Jc RN  Rheumatology Clinic

## 2020-10-30 NOTE — LETTER
"10/30/2020       RE: Jim Willingham  [address] [place] [facility] [address]     Dear Colleague,    Thank you for referring your patient, Jim Willingham, to the [facility] at [facility]. Please see a copy of my visit note below.    Jim Willingham is a 65 year old male who is being evaluated via a billable video visit.      The patient has been notified of following:     \"This video visit will be conducted via a call between you and your physician/provider. We have found that certain health care needs can be provided without the need for an in-person physical exam.  This service lets us provide the care you need with a video conversation.  If a prescription is necessary we can send it directly to your pharmacy.  If lab work is needed we can place an order for that and you can then stop by our lab to have the test done at a later time.    Video visits are billed at different rates depending on your insurance coverage.  Please reach out to your insurance provider with any questions.    If during the course of the call the physician/provider feels a video visit is not appropriate, you will not be charged for this service.\"    Patient has given verbal consent for Video visit? Yes  How would you like to obtain your AVS? MyChart  If you are dropped from the video visit, the video invite should be resent to: Text to cell phone: [phone]  Will anyone else be joining your video visit? No      Christina Lagos [facility]    Palliative Care Outpatient Clinic    (This note was transcribed using voice recognition software. While I review and edit the transcription, I may miss errors, and the software sometimes does unexpected capitalizations and formatting that I miss. Please let me know of any serious mistranscriptions and I will addend this note.)    Patient ID:  He has NICM s/p HM II LVAD 2017 as DT. Subsequently lost weight and is being evaluated for " "transplantation. Course complicated by CKD, drive line infection, gout    +HCD on chart, names sister Arielle as his agent; provides many details about his wishes in his HCD    We met inpatient 5/2017    History:   We reviewed the role of pal care for heart transplant patients.  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     Heart transplantation preparedness plan    Patient s legally designated health care agent: sister. He'd want his wife involved as she's an RT and has medical knowledge too    Patient s personal goals/hopes for receiving the transplant: He talks of needing to be around, active for his great granddtr who he has adopted and is raising. She is 6 yo \"I need to stay around until she's 20 - she's the main reason I'm doing this.\"    Patient s worries/concerns when considering receiving the transplant: Not recovering, as below    Patient s thoughts about what constitutes a  good  quality of life: Being independent, active, able to raise his great granddtr; being able to communicate, not depend on others    Patient s thoughts about what health conditions would be unacceptable (situations the patient would want her/his doctors and loved ones to know that she/he would not want life prolonged)? Prolonged/permanent state of not being able to communicate. Prolonged dependence on others to take care of his body. He can imagine finding it acceptable to have some permanent disabilities and still finding life acceptable, but specifically mentions not being able to communicate and being dependent on others for toilet care as not being acceptable    Heart transplantation carries a small risk of perioperative stroke/brain injury, which however can be devastating. After a stroke, what sort of functional outcomes would be acceptable vs unacceptable to the patient? As above     Chronic mechanical ventilation via a tracheostomy tube is a risk. What are " the circumstances the patient would want her/his physicians and family to keep them alive with long-term mechanical ventilation vs allow them to die?  He would accept a trach, and talked about seeing patients eventually have reasonable recoveries after needing long term ventilation/trach. He also says if he was dependent on ventilation long term and his doctors felt there was no/minimal chance of returning to an acceptable life, he would not want to be prolonged like that, it would be ok to allow him to die    SH: As above. Lives with wife and great granddtr who they are doing at home schooling with right now    ROS: 5 syst ros ow neg    PE: There were no vitals taken for this visit.   Wt Readings from Last 3 Encounters:   10/07/20 101.6 kg (224 lb)   10/02/20 100.7 kg (222 lb)   09/27/20 100.2 kg (221 lb)     Gen alert, comfortable appearing, NAD.   Head NCAT.  Eyes anicteric without injection  Face symmetric, eyes conjugate  Mouth pink, moist appearing  Lungs unlabored, no cough, speaking full sentences  Skin no rashes or lesions evident on face/neck  Neuro Face symmetric, eyes conjugate; speech fluent.  Neuropsych exam normal including affect, sensorium, gross memory, thought processes, and fund of knowledge.     Data reviewed:  I reviewed recent labs and imaging, my comments:  Cr 2  Na 134    Imaging unremarkable    Impression & Recommendations:  66 yo man with NICM s/p LVAD, being evaluated for heart transplant    Extensive, specific, transplant related ACP as summarized above.  Encouraged him to make sure his sister and wife knows these wishes (he thinks his wife does, but wants to make sure his sister does too) which he reports he was planning on doing anyway    F/u prn    Chart data reviewed today:    Family History   Problem Relation Age of Onset     Cerebrovascular Disease Mother      Diabetes Mother      Hypertension Mother      Coronary Artery Disease Father      Diabetes Type 2  Father      Past Medical  History:   Diagnosis Date     Benign essential hypertension 5/11/2017     Cardiomyopathy, unspecified (H) 5/8/2017     CKD (chronic kidney disease) stage 3, GFR 30-59 ml/min 5/11/2017     Depression 5/11/2017     Diabetes mellitus (H) 5/11/2017     H/O gastric bypass 5/11/2017     ICD (implantable cardioverter-defibrillator), biventricular, in situ 5/11/2017     LVAD (left ventricular assist device) present (H)      NICM (nonischemic cardiomyopathy) (H)/ EF 20% 5/11/2017    ECHO: LVEDd. 7.66 cm, Restrictive pattern , Severe mitral valve regurgitation     CECILIA (obstructive sleep apnea) 5/11/2017     Paroxysmal atrial fibrillation (H) 5/11/2017     Paroxysmal VT (H) 5/11/2017     Uncomplicated asthma      Vitamin B12 deficiency (non anemic) 5/11/2017     Past Surgical History:   Procedure Laterality Date     ANESTHESIA CARDIOVERSION N/A 5/11/2020    Procedure: ANESTHESIA, FOR CARDIOVERSION @1100;  Surgeon: GENERIC ANESTHESIA PROVIDER;  Location:  OR     CV RIGHT HEART CATH N/A 7/24/2019    Procedure: CV RIGHT HEART CATH;  Surgeon: Renu Sears MD;  Location:  HEART CARDIAC CATH LAB     CV RIGHT HEART CATH N/A 8/5/2020    Procedure: CV RIGHT HEART CATH;  Surgeon: Nicola Seth MD;  Location:  HEART CARDIAC CATH LAB     GI SURGERY  2003    [place] [facility] [place]     INSERT VENTRICULAR ASSIST DEVICE LEFT (HEARTMATE II) N/A 6/19/2017    Procedure: INSERT VENTRICULAR ASSIST DEVICE LEFT (HEARTMATE II);  Median Sternotomy Heartmate II Left Ventricular Assist Device Insertion on Pump Oxygenator;  Surgeon: Ronnie Quigley MD;  Location:  OR     ORTHOPEDIC SURGERY  1994    right knee wired     PICC DOUBLE LUMEN PLACEMENT Right 09/23/2020    5FR PICC DL. Length-43cm (1cm out).     Allergies   Allergen Reactions     Beer Shortness Of Breath     Grass Shortness Of Breath     Ace Inhibitors Cough     Dust Mites Other (See Comments)     Asthma     Mold Other (See Comments)     Asthma     Penicillins Other (See Comments)     Unknown  - childhood exposure    Tolerated Zosyn 9/18-9/20/2020     Sulfa Drugs Other (See Comments) and Unknown     Unknown childhood reaction       Medications: I have reviewed the patient's medication profile.  MNPMP:     At least 15 min of today's visit discussing aCP as above  Thank you for involving us in the patient's care.   Forest Gonzalez MD / Palliative Medicine / Katlyn dejesus via [facility].          Video-Visit Details    Type of service:  Video Visit    Video Start Time: 0803  Video End Time: 0830    Originating Location (pt. Location): Home    Distant Location (provider location):  I am at home    Platform used for Video Visit: KimWell

## 2020-11-02 ENCOUNTER — OFFICE VISIT (OUTPATIENT)
Dept: PEDIATRICS | Facility: CLINIC | Age: 65
End: 2020-11-02
Payer: COMMERCIAL

## 2020-11-02 VITALS
DIASTOLIC BLOOD PRESSURE: 74 MMHG | SYSTOLIC BLOOD PRESSURE: 104 MMHG | BODY MASS INDEX: 36.09 KG/M2 | HEIGHT: 68 IN | HEART RATE: 89 BPM | WEIGHT: 238.1 LBS | OXYGEN SATURATION: 97 % | TEMPERATURE: 97.9 F

## 2020-11-02 DIAGNOSIS — I48.0 PAROXYSMAL ATRIAL FIBRILLATION (H): ICD-10-CM

## 2020-11-02 DIAGNOSIS — E66.01 MORBID OBESITY (H): ICD-10-CM

## 2020-11-02 DIAGNOSIS — Z79.4 TYPE 2 DIABETES MELLITUS WITH COMPLICATION, WITH LONG-TERM CURRENT USE OF INSULIN (H): ICD-10-CM

## 2020-11-02 DIAGNOSIS — Z95.811 LVAD (LEFT VENTRICULAR ASSIST DEVICE) PRESENT (H): ICD-10-CM

## 2020-11-02 DIAGNOSIS — M12.812 ROTATOR CUFF TEAR ARTHROPATHY OF BOTH SHOULDERS: ICD-10-CM

## 2020-11-02 DIAGNOSIS — Z95.810 ICD (IMPLANTABLE CARDIOVERTER-DEFIBRILLATOR), BIVENTRICULAR, IN SITU: ICD-10-CM

## 2020-11-02 DIAGNOSIS — Z98.84 H/O GASTRIC BYPASS: ICD-10-CM

## 2020-11-02 DIAGNOSIS — I42.8 NICM (NONISCHEMIC CARDIOMYOPATHY) (H): Primary | ICD-10-CM

## 2020-11-02 DIAGNOSIS — G56.03 BILATERAL CARPAL TUNNEL SYNDROME: ICD-10-CM

## 2020-11-02 DIAGNOSIS — Z23 NEED FOR TDAP VACCINATION: ICD-10-CM

## 2020-11-02 DIAGNOSIS — E11.8 TYPE 2 DIABETES MELLITUS WITH COMPLICATION, WITH LONG-TERM CURRENT USE OF INSULIN (H): ICD-10-CM

## 2020-11-02 DIAGNOSIS — M75.102 ROTATOR CUFF TEAR ARTHROPATHY OF BOTH SHOULDERS: ICD-10-CM

## 2020-11-02 DIAGNOSIS — N18.32 STAGE 3B CHRONIC KIDNEY DISEASE (H): ICD-10-CM

## 2020-11-02 DIAGNOSIS — I47.29 PAROXYSMAL VT (H): ICD-10-CM

## 2020-11-02 DIAGNOSIS — M1A.00X0 GOUTY ARTHROPATHY, CHRONIC, WITHOUT TOPHI: ICD-10-CM

## 2020-11-02 DIAGNOSIS — J44.9 CHRONIC OBSTRUCTIVE PULMONARY DISEASE, UNSPECIFIED COPD TYPE (H): ICD-10-CM

## 2020-11-02 DIAGNOSIS — D50.9 IRON DEFICIENCY ANEMIA, UNSPECIFIED IRON DEFICIENCY ANEMIA TYPE: ICD-10-CM

## 2020-11-02 DIAGNOSIS — G47.33 OSA (OBSTRUCTIVE SLEEP APNEA): ICD-10-CM

## 2020-11-02 DIAGNOSIS — M12.811 ROTATOR CUFF TEAR ARTHROPATHY OF BOTH SHOULDERS: ICD-10-CM

## 2020-11-02 DIAGNOSIS — F33.9 MAJOR DEPRESSION, RECURRENT, CHRONIC (H): ICD-10-CM

## 2020-11-02 DIAGNOSIS — M75.101 ROTATOR CUFF TEAR ARTHROPATHY OF BOTH SHOULDERS: ICD-10-CM

## 2020-11-02 DIAGNOSIS — Z79.01 LONG TERM CURRENT USE OF ANTICOAGULANT THERAPY: ICD-10-CM

## 2020-11-02 PROBLEM — E66.812 CLASS 2 SEVERE OBESITY DUE TO EXCESS CALORIES WITH SERIOUS COMORBIDITY AND BODY MASS INDEX (BMI) OF 35.0 TO 35.9 IN ADULT (H): Status: ACTIVE | Noted: 2020-11-02

## 2020-11-02 PROBLEM — F32.A DEPRESSION: Status: RESOLVED | Noted: 2017-05-11 | Resolved: 2020-11-02

## 2020-11-02 PROBLEM — I50.9 CHF (CONGESTIVE HEART FAILURE) (H): Status: RESOLVED | Noted: 2017-05-23 | Resolved: 2020-11-02

## 2020-11-02 PROCEDURE — 99205 OFFICE O/P NEW HI 60 MIN: CPT | Mod: 25 | Performed by: INTERNAL MEDICINE

## 2020-11-02 PROCEDURE — 90715 TDAP VACCINE 7 YRS/> IM: CPT | Performed by: INTERNAL MEDICINE

## 2020-11-02 PROCEDURE — 90471 IMMUNIZATION ADMIN: CPT | Performed by: INTERNAL MEDICINE

## 2020-11-02 ASSESSMENT — MIFFLIN-ST. JEOR: SCORE: 1853.48

## 2020-11-02 ASSESSMENT — ANXIETY QUESTIONNAIRES
6. BECOMING EASILY ANNOYED OR IRRITABLE: NOT AT ALL
1. FEELING NERVOUS, ANXIOUS, OR ON EDGE: SEVERAL DAYS
GAD7 TOTAL SCORE: 3
5. BEING SO RESTLESS THAT IT IS HARD TO SIT STILL: NOT AT ALL
IF YOU CHECKED OFF ANY PROBLEMS ON THIS QUESTIONNAIRE, HOW DIFFICULT HAVE THESE PROBLEMS MADE IT FOR YOU TO DO YOUR WORK, TAKE CARE OF THINGS AT HOME, OR GET ALONG WITH OTHER PEOPLE: SOMEWHAT DIFFICULT
3. WORRYING TOO MUCH ABOUT DIFFERENT THINGS: SEVERAL DAYS
7. FEELING AFRAID AS IF SOMETHING AWFUL MIGHT HAPPEN: NOT AT ALL
2. NOT BEING ABLE TO STOP OR CONTROL WORRYING: SEVERAL DAYS

## 2020-11-02 ASSESSMENT — PATIENT HEALTH QUESTIONNAIRE - PHQ9
5. POOR APPETITE OR OVEREATING: NOT AT ALL
SUM OF ALL RESPONSES TO PHQ QUESTIONS 1-9: 4

## 2020-11-02 NOTE — PROGRESS NOTES
Subjective     Jim Willingham is a 65 year old male who presents to clinic today for the following health issues:    HPI         New Patient/Transfer of Care: General acute hospital.  Dr. Wiggins.    65-year-old gentleman with complex medical history which includes nonischemic cardiomyopathy with EF around 20%, s/p LVAD since 2017, paroxysmal atrial fibrillation, episode of ventricular tachycardia, status post ICD, chronic kidney disease stage III, chronic obstructive lung disease, essential hypertension, type 2 diabetes mellitus requiring insulin for control, major depression and other health issues mentioned in the problem list and past medical history, is coming to establish care with me.    Patient has multiple subspecialties looking after him.  He was recently hospitalized in September 2020 with staff pulmonary and his bacteremia related to LVAD line infection and he just finished a course of vancomycin spiking.  He was treated for 6 weeks.  He has a PICC line which will be removed perhaps tomorrow.    Recently he has had increased symptoms of depression related to health as well as finances.  Having some rough patch with his wife.  Recently the citalopram was increased to 30 mg daily.  He has bilateral shoulder pain and has known rotator cuff tear according to the patient.  Surgery at one time was recommended but due to LVAD he chose not to proceed.  Also has bilateral carpal tunnel syndrome and would like to consider surgery for it.  His symptoms are getting worse including weakness of the hands, severe numbness and some tingling.    Currently denies shortness of breath.  No dizziness or lightheadedness.    Review of Systems   Constitutional, HEENT, cardiovascular, pulmonary, GI, , musculoskeletal, neuro, skin, endocrine and psych systems are negative, except as otherwise noted.      Objective    There were no vitals taken for this visit.  There is no height or weight on file to calculate BMI.  Physical  Exam   GENERAL: healthy, alert and no distress  EYES: Eyes grossly normal to inspection, PERRL and conjunctivae and sclerae normal  NECK: no adenopathy, no asymmetry, masses, or scars and thyroid normal to palpation  RESP: lungs clear to auscultation - no rales, rhonchi or wheezes  CV: Heart sounds not heard due to LVAD.  LVAD noise started.  There is 1+ leg edema.  ABDOMEN: soft, nontender, no hepatosplenomegaly, no masses and bowel sounds normal  MS: There is evidence of muscle wasting of the tendon and hypothenar muscles of both hands hand grasp is poor bilateral.  NEURO: Normal strength and tone, mentation intact and speech normal      Never performed on 10/27/2020 was reviewed including normal allergy creatinine was 2.04 with GFR of 34.  Hemoglobin 10.3 with hematocrit 33.6.    Assessment & Plan     1.  Nonischemic cardiomyopathy with EF 20%.  Currently on Bumex 2 mg twice daily, Zaroxolyn 2.5 mg on a as needed basis, hydralazine 30 mg 3 times daily.  Patient currently on LVAD.  2.  Left ventricular assist device present since 6/9/2017.  Recently had a line infection pleated 6 weeks of vancomycin therapy last Friday.  3.  Paroxysmal atrial fibrillation, currently has been in sinus rhythm.   4.  Paroxysmal V. tach in the past.  Has ICD.  5.  Implantable cardioverter defibrillator.  6.  Long-term anticoagulation with Coumadin for about mentioned reasons.  7.  Chronic kidney disease stage III  8.  Iron deficiency anemia recently received iron therapy.  The also probably has anemia of chronic disease as well.  9.  Type 2 diabetes mellitus been treated with short and long-term insulin.  Last A1c was 7.5 on 8/5/2020.  I will be checking A1c tomorrow.  No recent hypoglycemia.  10.  Chronic obstructive lung disease with last pulmonary function study on 9/15/2017 showing FEV1 of 1.04 L (32%), FEV1 2.18 L (54%), FEV1 1 to FVC ratio 48% of predicted and DLCO reduced to 63%.  He appears to have severe COPD without  "significant currently treated with Breo Ellipta, albuterol and also Singulair.  11.  Major depression recurrent chronic.  Patient feels he has more symptoms but recently had increase escitalopram so we will reassess in a month.  12.  Bilateral carpal tunnel syndrome with increased symptoms.  Will refer for EMG nerve conduction study.  14.  Rotator cuff tear arthropathy of both shoulders by history.  No active treatment recommended.  15.  Obstructive sleep apnea on auto CPAP.    16.  Obesity class II.  Status post previous Sylvester-en-Y gastric bypass in 2003 at which time he had weight 364 pounds.    17.  Gouty arthropathy without tophi been treated with allopurinol 3 mg a day and 5 mg of prednisone a day for paradoxical flare prophylaxis.  18.  Tdap vaccine provided today.      I will be having patient return for follow-up in a month.  His issues are very complex and this was my first visit with him so we will take a while for me to know him better.  Tomorrow he is having for lab and I will add hemoglobin A1c and a lipid profile referred him to neurology for EMG for carpal tunnel syndrome.      Total time spent 60 minutes with greater than 50% of the time spent.  Face-to-face consultation, care coordination and review of medical records.         BMI:   Estimated body mass index is 35.94 kg/m  as calculated from the following:    Height as of this encounter: 1.734 m (5' 8.25\").    Weight as of this encounter: 108 kg (238 lb 1.6 oz).   Weight management plan: Discussed healthy diet and exercise guidelines       Ricardo Gómez MD  [facility]"

## 2020-11-03 ENCOUNTER — CARE COORDINATION (OUTPATIENT)
Dept: CARDIOLOGY | Facility: CLINIC | Age: 65
End: 2020-11-03

## 2020-11-03 DIAGNOSIS — I50.22 CHRONIC SYSTOLIC CONGESTIVE HEART FAILURE (H): Primary | ICD-10-CM

## 2020-11-03 ASSESSMENT — ANXIETY QUESTIONNAIRES: GAD7 TOTAL SCORE: 3

## 2020-11-03 NOTE — PROGRESS NOTES
Pt sent message to VAD Coordinator with concerns of weight gain. Pt saw a new PCP yesterday. On the clinic scale his weight was 238lb. His home scale was 228lb. Pt has had issues with weight gain over the last ~2 weeks and has tried metolazone as well as increasing Bumex dosing. PCP assessed a 1+ pitting edema to pt's LE, L > R. Pt is reporting more fatigue as well as muscle weakness/fatigue. Pt has had no change in breathing. He reports that home care is coming today to draw labs and possibly remove PICC. With ongoing issues with weight, it is advisable that pt be seen in clinic for assessment. Pt is agreeable to this. Will schedule pt for Thursday at 9:30 with ROGER Rivera. Pt will have labs drawn at 9am.

## 2020-11-03 NOTE — PROGRESS NOTES
Discussed pt's symptoms with ROGER Rivera. She advised to increase Bumex to 3mg BID and increase KCL to 20mEq in the am and 10mEq in the afternoon.   Called pt to relay instructions. Pt was on the phone with home care and they are now planning to come tomorrow to get labs drawn. Pt verbalized understanding of medication changes.

## 2020-11-04 ENCOUNTER — CARE COORDINATION (OUTPATIENT)
Dept: CARDIOLOGY | Facility: CLINIC | Age: 65
End: 2020-11-04

## 2020-11-04 ENCOUNTER — HOME INFUSION (PRE-WILLOW HOME INFUSION) (OUTPATIENT)
Dept: PHARMACY | Facility: CLINIC | Age: 65
End: 2020-11-04

## 2020-11-04 LAB
ANION GAP SERPL CALCULATED.3IONS-SCNC: 7 MMOL/L (ref 3–14)
BUN SERPL-MCNC: 35 MG/DL (ref 7–30)
CALCIUM SERPL-MCNC: 8.5 MG/DL (ref 8.5–10.1)
CHLORIDE SERPL-SCNC: 105 MMOL/L (ref 94–109)
CO2 SERPL-SCNC: 28 MMOL/L (ref 20–32)
CREAT SERPL-MCNC: 1.89 MG/DL (ref 0.66–1.25)
GFR SERPL CREATININE-BSD FRML MDRD: 37 ML/MIN/{1.73_M2}
GLUCOSE SERPL-MCNC: 80 MG/DL (ref 70–99)
POTASSIUM SERPL-SCNC: 4.3 MMOL/L (ref 3.4–5.3)
SODIUM SERPL-SCNC: 140 MMOL/L (ref 133–144)

## 2020-11-04 PROCEDURE — 80048 BASIC METABOLIC PNL TOTAL CA: CPT | Performed by: COLON & RECTAL SURGERY

## 2020-11-04 PROCEDURE — 87040 BLOOD CULTURE FOR BACTERIA: CPT | Performed by: COLON & RECTAL SURGERY

## 2020-11-04 NOTE — PROGRESS NOTES
Pt had bmp drawn by home care today. Reviewed results with ROGER Faust. Creatinine slightly improved at 1.89. Plan to continue Bumex at 3mg BID and Kcl at 20meq in the am and 10meq in the pm.   Called pt to review results and plan. Pt reports weight is unchanged at 228lb. He verbalized understanding of plan. He will be seen in clinic tomorrow.

## 2020-11-05 ENCOUNTER — ANTICOAGULATION THERAPY VISIT (OUTPATIENT)
Dept: ANTICOAGULATION | Facility: CLINIC | Age: 65
End: 2020-11-05

## 2020-11-05 ENCOUNTER — OFFICE VISIT (OUTPATIENT)
Dept: CARDIOLOGY | Facility: CLINIC | Age: 65
End: 2020-11-05
Attending: PHYSICIAN ASSISTANT
Payer: COMMERCIAL

## 2020-11-05 VITALS
HEIGHT: 68 IN | WEIGHT: 228 LBS | BODY MASS INDEX: 34.56 KG/M2 | SYSTOLIC BLOOD PRESSURE: 88 MMHG | OXYGEN SATURATION: 99 %

## 2020-11-05 DIAGNOSIS — E11.8 TYPE 2 DIABETES MELLITUS WITH COMPLICATION, WITH LONG-TERM CURRENT USE OF INSULIN (H): ICD-10-CM

## 2020-11-05 DIAGNOSIS — M1A.3790 CHRONIC GOUT DUE TO RENAL IMPAIRMENT INVOLVING FOOT WITHOUT TOPHUS, UNSPECIFIED LATERALITY: ICD-10-CM

## 2020-11-05 DIAGNOSIS — Z95.811 LVAD (LEFT VENTRICULAR ASSIST DEVICE) PRESENT (H): ICD-10-CM

## 2020-11-05 DIAGNOSIS — I48.0 PAROXYSMAL ATRIAL FIBRILLATION (H): ICD-10-CM

## 2020-11-05 DIAGNOSIS — I50.22 CHRONIC SYSTOLIC CONGESTIVE HEART FAILURE (H): ICD-10-CM

## 2020-11-05 DIAGNOSIS — Z95.811 LVAD (LEFT VENTRICULAR ASSIST DEVICE) PRESENT (H): Primary | ICD-10-CM

## 2020-11-05 DIAGNOSIS — Z79.01 LONG TERM CURRENT USE OF ANTICOAGULANT THERAPY: ICD-10-CM

## 2020-11-05 DIAGNOSIS — E66.9 OBESITY WITH BODY MASS INDEX (BMI) OF 30.0 TO 39.9: ICD-10-CM

## 2020-11-05 DIAGNOSIS — Z79.4 TYPE 2 DIABETES MELLITUS WITH COMPLICATION, WITH LONG-TERM CURRENT USE OF INSULIN (H): ICD-10-CM

## 2020-11-05 DIAGNOSIS — I50.810 RIGHT VENTRICULAR FAILURE (H): ICD-10-CM

## 2020-11-05 LAB
ALBUMIN SERPL-MCNC: 3.8 G/DL (ref 3.4–5)
ALP SERPL-CCNC: 110 U/L (ref 40–150)
ALT SERPL W P-5'-P-CCNC: 57 U/L (ref 0–70)
ANION GAP SERPL CALCULATED.3IONS-SCNC: 8 MMOL/L (ref 3–14)
AST SERPL W P-5'-P-CCNC: 44 U/L (ref 0–45)
BILIRUB SERPL-MCNC: 0.6 MG/DL (ref 0.2–1.3)
BUN SERPL-MCNC: 32 MG/DL (ref 7–30)
CALCIUM SERPL-MCNC: 8.2 MG/DL (ref 8.5–10.1)
CHLORIDE SERPL-SCNC: 105 MMOL/L (ref 94–109)
CHOLEST SERPL-MCNC: 209 MG/DL
CO2 SERPL-SCNC: 26 MMOL/L (ref 20–32)
CREAT SERPL-MCNC: 1.83 MG/DL (ref 0.66–1.25)
ERYTHROCYTE [DISTWIDTH] IN BLOOD BY AUTOMATED COUNT: 17.8 % (ref 10–15)
GFR SERPL CREATININE-BSD FRML MDRD: 38 ML/MIN/{1.73_M2}
GLUCOSE SERPL-MCNC: 134 MG/DL (ref 70–99)
HBA1C MFR BLD: 5.7 % (ref 0–5.6)
HCT VFR BLD AUTO: 34.1 % (ref 40–53)
HDLC SERPL-MCNC: 87 MG/DL
HGB BLD-MCNC: 10 G/DL (ref 13.3–17.7)
INR PPP: 2.28 (ref 0.86–1.14)
LDH SERPL L TO P-CCNC: 391 U/L (ref 85–227)
LDLC SERPL CALC-MCNC: 104 MG/DL
MCH RBC QN AUTO: 26 PG (ref 26.5–33)
MCHC RBC AUTO-ENTMCNC: 29.3 G/DL (ref 31.5–36.5)
MCV RBC AUTO: 89 FL (ref 78–100)
NONHDLC SERPL-MCNC: 122 MG/DL
PLATELET # BLD AUTO: 242 10E9/L (ref 150–450)
POTASSIUM SERPL-SCNC: 3.8 MMOL/L (ref 3.4–5.3)
PROT SERPL-MCNC: 6.7 G/DL (ref 6.8–8.8)
RBC # BLD AUTO: 3.85 10E12/L (ref 4.4–5.9)
SODIUM SERPL-SCNC: 139 MMOL/L (ref 133–144)
TRIGL SERPL-MCNC: 91 MG/DL
URATE SERPL-MCNC: 4.9 MG/DL (ref 3.5–7.2)
WBC # BLD AUTO: 4.8 10E9/L (ref 4–11)

## 2020-11-05 PROCEDURE — 85610 PROTHROMBIN TIME: CPT | Performed by: PATHOLOGY

## 2020-11-05 PROCEDURE — 80061 LIPID PANEL: CPT | Performed by: PATHOLOGY

## 2020-11-05 PROCEDURE — 83615 LACTATE (LD) (LDH) ENZYME: CPT | Performed by: PATHOLOGY

## 2020-11-05 PROCEDURE — 93750 INTERROGATION VAD IN PERSON: CPT | Performed by: PHYSICIAN ASSISTANT

## 2020-11-05 PROCEDURE — 36415 COLL VENOUS BLD VENIPUNCTURE: CPT | Performed by: PATHOLOGY

## 2020-11-05 PROCEDURE — 84550 ASSAY OF BLOOD/URIC ACID: CPT | Performed by: PATHOLOGY

## 2020-11-05 PROCEDURE — 99214 OFFICE O/P EST MOD 30 MIN: CPT | Mod: 25 | Performed by: PHYSICIAN ASSISTANT

## 2020-11-05 PROCEDURE — 83036 HEMOGLOBIN GLYCOSYLATED A1C: CPT | Performed by: PATHOLOGY

## 2020-11-05 PROCEDURE — G0463 HOSPITAL OUTPT CLINIC VISIT: HCPCS | Mod: 25

## 2020-11-05 PROCEDURE — 85027 COMPLETE CBC AUTOMATED: CPT | Performed by: PATHOLOGY

## 2020-11-05 PROCEDURE — 80053 COMPREHEN METABOLIC PANEL: CPT | Performed by: PATHOLOGY

## 2020-11-05 ASSESSMENT — PAIN SCALES - GENERAL: PAINLEVEL: NO PAIN (0)

## 2020-11-05 ASSESSMENT — MIFFLIN-ST. JEOR: SCORE: 1805.29

## 2020-11-05 NOTE — LETTER
11/5/2020      RE: Jim Willingham  [address] [facility] [id number]       Dear Colleague,    Thank you for the opportunity to participate in the care of your patient, Jim Willingham, at the [facility]. Please see a copy of my visit note below.    In person visit    In person visit    HPI:   Jim Willingham is a 64 year old male with a past medical history of NICM (EF 20%) s/p HM II LVAD placement (6/19/17) at  (obesity) Other relavant history includes a. Fib, chronic kidney disease stage III, diabetes mellitus type 2, RV failure, CECILIA, obesity, hypertension, COPD, asthma. He is here for heart failure and LVAD follow up.     10/22 Dr. Sinclair  Seen for history of CoNS bacteremia. Plan: - Will complete a 6 week course of IV vancomycin (stop date 10/30).Will repeat blood cx x2 ~5-7 days after stopping abx.  PICC can be pulled at that time. If he has recurrent CoNS bacteremia, would then plan on lifelong suppression with oral antibiotics.      This visit   He has had escalating diuretic doses over the last few weeks. He left the hospital on very low bumex. More recently was up to 4mg and 2 mg. Now up to 6 mg BID. Weight today was 228 at home. In the past he was 219. Was 267 in April. No SOB at rest. Walking from the check-in desk to the waiting area made him feel RESENDIZ. This is better than one month ago. Left leg is bigger than the right leg, now they are about the same.  This is better than it had been. He feels moderate abdominal edema. No orthopnea or PND.  He is wearing his CPAP as instructed.  Lightheadedness with quick position changes.  Otherwise no dizziness. No chest pain.  No palpitations.    He denies bleeding symptoms including blood in the urine, blood in stool, prolonged nosebleeds.    No driveline redness, drainage, pain.  No fevers or chills.    He has some mild to moderate headaches which he attributes to stress, not  new, mostly happening at night.  Get better sleep or Tylenol.  No stroke symptoms.    No LVAD alarms    Cardiac Medications  ASA 81 mg daily  Coumadin  Bumex 6/6 (up from 4/4) - has been 6/6 for 1-2 days  Amiodarone 400 mg once a day  Hydralazine 30 TID  KCl 20/10 daily    PAST MEDICAL HISTORY:  Past Medical History:   Diagnosis Date     Benign essential hypertension 5/11/2017     Bilateral carpal tunnel syndrome 11/2/2020     Cardiomyopathy, unspecified (H) 5/8/2017     CKD (chronic kidney disease) stage 3, GFR 30-59 ml/min 5/11/2017     COPD (chronic obstructive pulmonary disease) (H) 11/2/2020     Depression 5/11/2017     Diabetes mellitus (H) 5/11/2017     Gouty arthropathy, chronic, without tophi 11/2/2020     H/O gastric bypass 5/11/2017     ICD (implantable cardioverter-defibrillator), biventricular, in situ 5/11/2017     LVAD (left ventricular assist device) present (H)      Major depression, recurrent, chronic (H) 11/2/2020     NICM (nonischemic cardiomyopathy) (H)/ EF 20% 5/11/2017    ECHO: LVEDd. 7.66 cm, Restrictive pattern , Severe mitral valve regurgitation     CECILIA (obstructive sleep apnea) 5/11/2017     Paroxysmal atrial fibrillation (H) 5/11/2017     Paroxysmal VT (H) 5/11/2017     Rotator cuff tear arthropathy of both shoulders 11/2/2020     Uncomplicated asthma      Vitamin B12 deficiency (non anemic) 5/11/2017       FAMILY HISTORY:  Family History   Problem Relation Age of Onset     Cerebrovascular Disease Mother 64     Diabetes Mother      Hypertension Mother      Coronary Artery Disease Father      Diabetes Type 2  Father      Obesity Brother      Obesity Brother      Cerebrovascular Disease Daughter 40       SOCIAL HISTORY:  Social History     Socioeconomic History     Marital status:      Spouse name: Not on file     Number of children: Not on file     Years of education: Not on file     Highest education level: Not on file   Occupational History     Not on file   Social Needs      Financial resource strain: Not on file     Food insecurity:     Worry: Not on file     Inability: Not on file     Transportation needs:     Medical: Not on file     Non-medical: Not on file   Tobacco Use     Smoking status: Former Smoker     Last attempt to quit:      Years since quittin.1     Smokeless tobacco: Never Used   Substance and Sexual Activity     Alcohol use: No     Drug use: No     Sexual activity: Yes     Partners: Female   Lifestyle     Physical activity:     Days per week: Not on file     Minutes per session: Not on file     Stress: Not on file   Relationships     Social connections:     Talks on phone: Not on file     Gets together: Not on file     Attends Jainism service: Not on file     Active member of club or organization: Not on file     Attends meetings of clubs or organizations: Not on file     Relationship status: Not on file     Intimate partner violence:     Fear of current or ex partner: Not on file     Emotionally abused: Not on file     Physically abused: Not on file     Forced sexual activity: Not on file   Other Topics Concern     Parent/sibling w/ CABG, MI or angioplasty before 65F 55M? No   Social History Narrative     Not on file       CURRENT MEDICATIONS:       acetaminophen (TYLENOL) 325 MG tablet, Take 650 mg by mouth every 6 hours as needed for mild pain       albuterol (PROAIR HFA) 108 (90 Base) MCG/ACT inhaler, Inhale 2 puffs into the lungs every 4 hours as needed for shortness of breath / dyspnea or wheezing        allopurinol (ZYLOPRIM) 300 MG tablet, Take 1 tablet (300 mg) by mouth daily       amiodarone (PACERONE) 200 MG tablet, Take 400 mg by mouth daily       aspirin 81 MG chewable tablet, 1 tablet (81 mg) by Oral or Feeding Tube route daily       blood glucose monitoring (ONE TOUCH ULTRA 2) meter device kit, Use to test blood sugar four times daily or as directed.       blood glucose VI test strip, Use to test blood sugar four times daily or as  directed.       bumetanide (BUMEX) 0.5 MG tablet, Take 3 mg by mouth 2 times daily       citalopram (CELEXA) 10 MG tablet, Take 3 tablets (30 mg) by mouth At Bedtime (Patient taking differently: Take by mouth At Bedtime )       cyanocobalamin (VITAMIN B12) 1000 MCG/ML injection, Inject 1,000 mcg into the muscle every 30 days       fluticasone-vilanterol (BREO ELLIPTA) 200-25 MCG/INH oral inhaler, Inhale 1 puff into the lungs daily       gabapentin (NEURONTIN) 300 MG capsule, Take 2 capsules (600 mg) by mouth At Bedtime (Patient taking differently: Take 300 mg by mouth At Bedtime 300mg in am, 300mg in the afternoon, 600mg at bedtime.)       hydrALAZINE (APRESOLINE) 10 MG tablet, Take 3 tablets (30 mg) by mouth 3 times daily       insulin glargine (LANTUS SOLOSTAR) 100 UNIT/ML pen, Inject 6 Units Subcutaneous At Bedtime       insulin lispro (HUMALOG KWIKPEN) 100 UNIT/ML (1 unit dial) KWIKPEN, Inject 1-7 Units Subcutaneous 4 times daily       insulin pen needle (32G X 4 MM) 32G X 4 MM miscellaneous, Use four pen needles daily or as directed.       metolazone (ZAROXOLYN) 2.5 MG tablet, Take 1 tablet (2.5 mg) by mouth as needed (take ONLY as directed by VAD team)       montelukast (SINGULAIR) 10 MG tablet, Take 10 mg by mouth At Bedtime       pantoprazole (PROTONIX) 40 MG EC tablet, Take 1 tablet (40 mg) by mouth every morning       potassium chloride ER (KLOR-CON M) 10 MEQ CR tablet, Take 20 mEq by mouth daily Take 20mEq in the am and 10mEq in the evening       traMADol (ULTRAM) 50 MG tablet, Take 2 tablets (100 mg) by mouth every 6 hours as needed for moderate pain or breakthrough pain       umeclidinium (INCRUSE ELLIPTA) 62.5 MCG/INH oral inhaler, Inhale 1 puff into the lungs daily       warfarin ANTICOAGULANT (COUMADIN) 5 MG tablet, Take 1 tablet (5 mg) by mouth daily (Patient taking differently: Take 5 mg by mouth daily 7.5 Sunday and thursday)       zinc sulfate (ZINCATE) 220 (50 Zn) MG capsule, Take 220 mg by  "mouth 2 times daily    No current facility-administered medications on file prior to visit.       ROS:   See HPI    EXAM:  BP (!) 88/0 (BP Location: Right arm, Patient Position: Sitting, Cuff Size: Adult Large)   Ht 1.73 m (5' 8.1\")   Wt 103.4 kg (228 lb)   SpO2 99%   BMI 34.57 kg/m      GENERAL: Appears comfortable, in no acute distress.   HEENT: Eye symmetrical, no discharge or icterus bilaterally. Mucous membranes moist and without lesions.  CV: Hum of HM3, no adventitious souds  RESPIRATORY: Respirations regular, even, and unlabored. Lungs CTA throughout.   GI: Soft and non distended with normoactive bowel sounds. No tenderness, rebound, guarding.   EXTREMITIES: No peripheral edema. All extremities are warm and well perfused  NEUROLOGIC: Alert and interacting appropriately. No focal deficits.   MUSCULOSKELETAL: No joint swelling or tenderness.   SKIN: No jaundice. No rashes or lesions. Driveline dressing c/d/i.        Labs - as reviewed in clinic with patient today:  CBC RESULTS:  Lab Results   Component Value Date    WBC 4.8 11/05/2020    RBC 3.85 (L) 11/05/2020    HGB 10.0 (L) 11/05/2020    HCT 34.1 (L) 11/05/2020    MCV 89 11/05/2020    MCH 26.0 (L) 11/05/2020    MCHC 29.3 (L) 11/05/2020    RDW 17.8 (H) 11/05/2020     11/05/2020       CMP RESULTS:  Lab Results   Component Value Date     11/05/2020    POTASSIUM 3.8 11/05/2020    CHLORIDE 105 11/05/2020    CO2 26 11/05/2020    ANIONGAP 8 11/05/2020     (H) 11/05/2020    BUN 32 (H) 11/05/2020    CR 1.83 (H) 11/05/2020    GFRESTIMATED 38 (L) 11/05/2020    GFRESTBLACK 44 (L) 11/05/2020    QAMAR 8.2 (L) 11/05/2020    BILITOTAL 0.6 11/05/2020    ALBUMIN 3.8 11/05/2020    ALKPHOS 110 11/05/2020    ALT 57 11/05/2020    AST 44 11/05/2020        INR RESULTS:  Lab Results   Component Value Date    INR 2.28 (H) 11/05/2020       Lab Results   Component Value Date    MAG 2.6 (H) 09/27/2020     Lab Results   Component Value Date    NTBNPI 1,162 (H) " 08/05/2020     Lab Results   Component Value Date    NTBNP 866 (H) 07/31/2019       Diagnostics  8/5/20 [facility]   Time  Systolic  Diastolic  Mean  A Wave  V Wave  EDP  Max dp/dt  HR    RA Pressures   3:41 PM    6 mmHg     7 mmHg     7 mmHg       107 bpm       RV Pressures   3:42 PM  24 mmHg         6 mmHg      153 bpm       PA Pressures   3:43 PM  23 mmHg     11 mmHg     15 mmHg         156 bpm       PCW Pressures   3:42 PM    4 mmHg     4 mmHg     5 mmHg       153 bpm         Cardiac Output Results   3:32 PM  4.03 L/min     1.83 L/min/m2     4.05 L/min     1.84 L/min/m2          3:43 PM  4.03 L/min                 7/15/2020 ICD check  In clinic device appointment conversion to remote device appointment to minimize COVID19 exposure for high risk patient populations.  Scheduled remote ICD transmission received and reviewed. Device transmission sent per MD orders. Patient has a Medtronic multi lead ICD programmed AAIR. Normal ICD function. No episodes recorded. No arrhythmias recorded. Presenting EGM = SR @ 70 bpm. AP = 0%.  = 0%. BVP = 0%. OptiVol fluid index is at baseline. Estimated battery longevity to MARVA = 6 months. Battery voltage = 2.88V. No short v-v intervals recorded. Lead impedance trends appear stable. Patient notified of interrogation results. Patient reports that he is feeling well and denies complaints. Plan for patient to send a remote transmission in 3 months as scheduled.  MIKY Feliz RN     Remote ICD transmission    8/5/20 ECHO     Interpretation Summary  LVAD cannula was seen in the expected anatomic position in the LV apex.  HM2 at 9400RPM.  LVIDd 61mm.  Septum normal.  Aortic valve remain closed.  Normal flow velocities.  Global right ventricular function is mildly to moderately reduced.  The inferior vena cava is normal.  No pericardial effusion is present.    1/20/2020 [facility]  Interpretation Summary  Technically difficult study. Patients heart rate is in the 120s-150s during  the study.  HM2 LVAD  is present and is funcioning at 9400RPM     Severely (EF 10-15%) reduced left ventricular function is present. Severe left  ventricular dilation is present. LVIDd is 6.6cm LVAD cannula in the apex is  not well visualized. Inflow and outflow velocities could not be obtained.  Aortic valve appears to open minimally with each beat. Septum is probably  midline but again difficult to visualize.  Global right ventricular function is moderately to severely reduced.  IVC diameter >2.1 cm collapsing <50% with sniff suggests a high RA pressure  estimated at 15 mmHg or greater. No pericardial effusion is present.     Compared to prior study on 1/16/20, LVIDd appears slightly larger. Patient is  severely tachycardic now. IVC appears more dilated.    7/24/2019 R[facility]   Time  Systolic  Diastolic  Mean  A Wave  V Wave  EDP  Max dp/dt  HR    RA Pressures  10:04 AM    10 mmHg     19044 mmHg     95877 mmHg       55 bpm       RV Pressures   9:42 AM        384 mmHg/sec         10:01 AM  25 mmHg         10 mmHg      39 bpm       PA Pressures  10:01 AM  25 mmHg     16 mmHg     19 mmHg         80 bpm       PCW Pressures  10:02 AM    11 mmHg     05233 mmHg     80816 mmHg       68 bpm          Time  TDCO  TDCI  Kee C.O.  Kee C.I.  Kee HR    Cardiac Output Results   9:42 AM  4.63 L/min     2.02 L/min/m2     4.62 L/min     2.01 L/min/m2            _____________________________________________________________________________    Assessment and Plan:   Jim Willingham is a 65 year old male with a past medical history of NICM (EF 20%) s/p HM II LVAD placement (6/19/17) at  (obesity) Other relavant history includes chronic kidney disease stage III, diabetes mellitus type 2, RV failure, CECILIA, obesity, hypertension, COPD, asthma. He presents for LVAD f/u.    He presents today for weight gain and RESENDIZ. He has had escalating diuretic doses recently and he started to lose weight on this.  I do think that he will try out somewhat soon, on Monday he should  decrease back to 4 mg twice a day.  At that point he should decrease his potassium to 20 mEq once a day.    We have been holding off on his PFTs and 6-minute walk due to Covid, I would like him to schedule this especially given the escalating Covid cases likely that things may get shot down on the next few weeks to months.  I would like him to have as much of the eval done as possible.  Ideally he will be as dry as possible which we will do our best to achieve prior to the testing.    #  Chronic systolic heart failure secondary to NICM  Stage D  NYHA Class IIIA  ACEi/ARB: Losartan stopped d/t fluctuating renal function, continue hydralazine.  BB: Stopped during recent admission  Aldosterone antagonist: Stopped d/t hyperkalemia.  SCD prophylaxis: ICD  Fluid status:  hypervolemic, conintue bumex 6 mg BID for now, on Monday decrease to 4mg BID     #  S/P LVAD implant inititally as DT due to obesity, now within goal BMI range and being evuated for transplant  Anticoagulation: Warfarin with INR goal of 2-3,  dosing per coumadin clinic  Antiplatelet: Aspirin 81 mg daily  MAP: Goal 65-85  LDH: Stable, 391, BL around 380-450    VAD Interrogation on 11/4/2020. VAD interrogation reviewed with VAD coordinator. Agree with findings. Occasional PI events. No power spikes, speed drops, or other findings suspicious of pump malfunction noted.     # RV failure  - Now off metoprolol, continue amidoarone  - Ongoing transplant evaluation     # Obesity:  Recommend weight loss with a goal weight of 255 which he has now met. Continue additional weight loss as able    # A. Fib  New in Jan. Now S/p Cardioversion in 5/2020. Symptoms improved dramatically. In sinus on last ICD check. No a. Fib episodes since cardioversion  - Continue amiodarone and coumadin     # CECILIA: Continue CPAP.       # Chronic kidney disease: Recent WALTER with peak of 2.04, continues to improve on the higher bumex dose so was likely cardiorenal. 1.83 today and down  trending. BL in the past year has been 1.2-1.4  - Bumex decrease as above  - Recheck in 1 week    # Iron deficiency anemia  - Have arranged for IV iron infusions: Venofer 300 x3, dose weekly  - Recheck Hgb next week, call for bleeding symptoms    Follow-up  - Needs PFT and 6 min walk for transplant eval  - RTC in December as scheduled  - Labs and weight check on Monday (WALTER, continued higher bumex dose, planned to drop to 4mg bID Monday)      Please do not hesitate to contact me if you have any questions/concerns.     Sincerely,     Didi Butler PA-C

## 2020-11-05 NOTE — PROGRESS NOTES
In person visit    In person visit    HPI:   Jim Willingham is a 64 year old male with a past medical history of NICM (EF 20%) s/p HM II LVAD placement (6/19/17) at  (obesity) Other relavant history includes a. Fib, chronic kidney disease stage III, diabetes mellitus type 2, RV failure, CECILIA, obesity, hypertension, COPD, asthma. He is here for heart failure and LVAD follow up.     10/22 Dr. Sinclair  Seen for history of CoNS bacteremia. Plan: - Will complete a 6 week course of IV vancomycin (stop date 10/30).Will repeat blood cx x2 ~5-7 days after stopping abx.  PICC can be pulled at that time. If he has recurrent CoNS bacteremia, would then plan on lifelong suppression with oral antibiotics.      This visit   He has had escalating diuretic doses over the last few weeks. He left the hospital on very low bumex. More recently was up to 4mg and 2 mg. Now up to 6 mg BID. Weight today was 228 at home. In the past he was 219. Was 267 in April. No SOB at rest. Walking from the check-in desk to the waiting area made him feel RESENDIZ. This is better than one month ago. Left leg is bigger than the right leg, now they are about the same.  This is better than it had been. He feels moderate abdominal edema. No orthopnea or PND.  He is wearing his CPAP as instructed.  Lightheadedness with quick position changes.  Otherwise no dizziness. No chest pain.  No palpitations.    He denies bleeding symptoms including blood in the urine, blood in stool, prolonged nosebleeds.    No driveline redness, drainage, pain.  No fevers or chills.    He has some mild to moderate headaches which he attributes to stress, not new, mostly happening at night.  Get better sleep or Tylenol.  No stroke symptoms.    No LVAD alarms    Cardiac Medications  ASA 81 mg daily  Coumadin  Bumex 6/6 (up from 4/4) - has been 6/6 for 1-2 days  Amiodarone 400 mg once a day  Hydralazine 30 TID  KCl 20/10 daily    PAST MEDICAL HISTORY:  Past Medical History:   Diagnosis  Date     Benign essential hypertension 2017     Bilateral carpal tunnel syndrome 2020     Cardiomyopathy, unspecified (H) 2017     CKD (chronic kidney disease) stage 3, GFR 30-59 ml/min 2017     COPD (chronic obstructive pulmonary disease) (H) 2020     Depression 2017     Diabetes mellitus (H) 2017     Gouty arthropathy, chronic, without tophi 2020     H/O gastric bypass 2017     ICD (implantable cardioverter-defibrillator), biventricular, in situ 2017     LVAD (left ventricular assist device) present (H)      Major depression, recurrent, chronic (H) 2020     NICM (nonischemic cardiomyopathy) (H)/ EF 20% 2017    ECHO: LVEDd. 7.66 cm, Restrictive pattern , Severe mitral valve regurgitation     CECILIA (obstructive sleep apnea) 2017     Paroxysmal atrial fibrillation (H) 2017     Paroxysmal VT (H) 2017     Rotator cuff tear arthropathy of both shoulders 2020     Uncomplicated asthma      Vitamin B12 deficiency (non anemic) 2017       FAMILY HISTORY:  Family History   Problem Relation Age of Onset     Cerebrovascular Disease Mother 64     Diabetes Mother      Hypertension Mother      Coronary Artery Disease Father      Diabetes Type 2  Father      Obesity Brother      Obesity Brother      Cerebrovascular Disease Daughter 40       SOCIAL HISTORY:  Social History     Socioeconomic History     Marital status:      Spouse name: Not on file     Number of children: Not on file     Years of education: Not on file     Highest education level: Not on file   Occupational History     Not on file   Social Needs     Financial resource strain: Not on file     Food insecurity:     Worry: Not on file     Inability: Not on file     Transportation needs:     Medical: Not on file     Non-medical: Not on file   Tobacco Use     Smoking status: Former Smoker     Last attempt to quit:      Years since quittin.1     Smokeless tobacco: Never Used    Substance and Sexual Activity     Alcohol use: No     Drug use: No     Sexual activity: Yes     Partners: Female   Lifestyle     Physical activity:     Days per week: Not on file     Minutes per session: Not on file     Stress: Not on file   Relationships     Social connections:     Talks on phone: Not on file     Gets together: Not on file     Attends Sabianism service: Not on file     Active member of club or organization: Not on file     Attends meetings of clubs or organizations: Not on file     Relationship status: Not on file     Intimate partner violence:     Fear of current or ex partner: Not on file     Emotionally abused: Not on file     Physically abused: Not on file     Forced sexual activity: Not on file   Other Topics Concern     Parent/sibling w/ CABG, MI or angioplasty before 65F 55M? No   Social History Narrative     Not on file       CURRENT MEDICATIONS:       acetaminophen (TYLENOL) 325 MG tablet, Take 650 mg by mouth every 6 hours as needed for mild pain       albuterol (PROAIR HFA) 108 (90 Base) MCG/ACT inhaler, Inhale 2 puffs into the lungs every 4 hours as needed for shortness of breath / dyspnea or wheezing        allopurinol (ZYLOPRIM) 300 MG tablet, Take 1 tablet (300 mg) by mouth daily       amiodarone (PACERONE) 200 MG tablet, Take 400 mg by mouth daily       aspirin 81 MG chewable tablet, 1 tablet (81 mg) by Oral or Feeding Tube route daily       blood glucose monitoring (ONE TOUCH ULTRA 2) meter device kit, Use to test blood sugar four times daily or as directed.       blood glucose VI test strip, Use to test blood sugar four times daily or as directed.       bumetanide (BUMEX) 0.5 MG tablet, Take 3 mg by mouth 2 times daily       citalopram (CELEXA) 10 MG tablet, Take 3 tablets (30 mg) by mouth At Bedtime (Patient taking differently: Take by mouth At Bedtime )       cyanocobalamin (VITAMIN B12) 1000 MCG/ML injection, Inject 1,000 mcg into the muscle every 30 days        "fluticasone-vilanterol (BREO ELLIPTA) 200-25 MCG/INH oral inhaler, Inhale 1 puff into the lungs daily       gabapentin (NEURONTIN) 300 MG capsule, Take 2 capsules (600 mg) by mouth At Bedtime (Patient taking differently: Take 300 mg by mouth At Bedtime 300mg in am, 300mg in the afternoon, 600mg at bedtime.)       hydrALAZINE (APRESOLINE) 10 MG tablet, Take 3 tablets (30 mg) by mouth 3 times daily       insulin glargine (LANTUS SOLOSTAR) 100 UNIT/ML pen, Inject 6 Units Subcutaneous At Bedtime       insulin lispro (HUMALOG KWIKPEN) 100 UNIT/ML (1 unit dial) KWIKPEN, Inject 1-7 Units Subcutaneous 4 times daily       insulin pen needle (32G X 4 MM) 32G X 4 MM miscellaneous, Use four pen needles daily or as directed.       metolazone (ZAROXOLYN) 2.5 MG tablet, Take 1 tablet (2.5 mg) by mouth as needed (take ONLY as directed by VAD team)       montelukast (SINGULAIR) 10 MG tablet, Take 10 mg by mouth At Bedtime       pantoprazole (PROTONIX) 40 MG EC tablet, Take 1 tablet (40 mg) by mouth every morning       potassium chloride ER (KLOR-CON M) 10 MEQ CR tablet, Take 20 mEq by mouth daily Take 20mEq in the am and 10mEq in the evening       traMADol (ULTRAM) 50 MG tablet, Take 2 tablets (100 mg) by mouth every 6 hours as needed for moderate pain or breakthrough pain       umeclidinium (INCRUSE ELLIPTA) 62.5 MCG/INH oral inhaler, Inhale 1 puff into the lungs daily       warfarin ANTICOAGULANT (COUMADIN) 5 MG tablet, Take 1 tablet (5 mg) by mouth daily (Patient taking differently: Take 5 mg by mouth daily 7.5 Sunday and thursday)       zinc sulfate (ZINCATE) 220 (50 Zn) MG capsule, Take 220 mg by mouth 2 times daily    No current facility-administered medications on file prior to visit.       ROS:   See HPI    EXAM:  BP (!) 88/0 (BP Location: Right arm, Patient Position: Sitting, Cuff Size: Adult Large)   Ht 1.73 m (5' 8.1\")   Wt 103.4 kg (228 lb)   SpO2 99%   BMI 34.57 kg/m      GENERAL: Appears comfortable, in no acute " distress.   HEENT: Eye symmetrical, no discharge or icterus bilaterally. Mucous membranes moist and without lesions.  CV: Hum of HM3, no adventitious souds  RESPIRATORY: Respirations regular, even, and unlabored. Lungs CTA throughout.   GI: Soft and non distended with normoactive bowel sounds. No tenderness, rebound, guarding.   EXTREMITIES: No peripheral edema. All extremities are warm and well perfused  NEUROLOGIC: Alert and interacting appropriately. No focal deficits.   MUSCULOSKELETAL: No joint swelling or tenderness.   SKIN: No jaundice. No rashes or lesions. Driveline dressing c/d/i.        Labs - as reviewed in clinic with patient today:  CBC RESULTS:  Lab Results   Component Value Date    WBC 4.8 11/05/2020    RBC 3.85 (L) 11/05/2020    HGB 10.0 (L) 11/05/2020    HCT 34.1 (L) 11/05/2020    MCV 89 11/05/2020    MCH 26.0 (L) 11/05/2020    MCHC 29.3 (L) 11/05/2020    RDW 17.8 (H) 11/05/2020     11/05/2020       CMP RESULTS:  Lab Results   Component Value Date     11/05/2020    POTASSIUM 3.8 11/05/2020    CHLORIDE 105 11/05/2020    CO2 26 11/05/2020    ANIONGAP 8 11/05/2020     (H) 11/05/2020    BUN 32 (H) 11/05/2020    CR 1.83 (H) 11/05/2020    GFRESTIMATED 38 (L) 11/05/2020    GFRESTBLACK 44 (L) 11/05/2020    QAMAR 8.2 (L) 11/05/2020    BILITOTAL 0.6 11/05/2020    ALBUMIN 3.8 11/05/2020    ALKPHOS 110 11/05/2020    ALT 57 11/05/2020    AST 44 11/05/2020        INR RESULTS:  Lab Results   Component Value Date    INR 2.28 (H) 11/05/2020       Lab Results   Component Value Date    MAG 2.6 (H) 09/27/2020     Lab Results   Component Value Date    NTBNPI 1,162 (H) 08/05/2020     Lab Results   Component Value Date    NTBNP 866 (H) 07/31/2019           Diagnostics  8/5/20 [facility]   Time  Systolic  Diastolic  Mean  A Wave  V Wave  EDP  Max dp/dt  HR    RA Pressures   3:41 PM    6 mmHg     7 mmHg     7 mmHg       107 bpm       RV Pressures   3:42 PM  24 mmHg         6 mmHg      153 bpm       PA Pressures    3:43 PM  23 mmHg     11 mmHg     15 mmHg         156 bpm       PCW Pressures   3:42 PM    4 mmHg     4 mmHg     5 mmHg       153 bpm         Cardiac Output Results   3:32 PM  4.03 L/min     1.83 L/min/m2     4.05 L/min     1.84 L/min/m2          3:43 PM  4.03 L/min                 7/15/2020 ICD check  In clinic device appointment conversion to remote device appointment to minimize COVID19 exposure for high risk patient populations.  Scheduled remote ICD transmission received and reviewed. Device transmission sent per MD orders. Patient has a Med[employer]onic multi lead ICD programmed AAIR. Normal ICD function. No episodes recorded. No arrhythmias recorded. Presenting EGM = SR @ 70 bpm. AP = 0%.  = 0%. BVP = 0%. OptiVol fluid index is at baseline. Estimated battery longevity to MARVA = 6 months. Battery voltage = 2.88V. No short v-v intervals recorded. Lead impedance trends appear stable. Patient notified of interrogation results. Patient reports that he is feeling well and denies complaints. Plan for patient to send a remote transmission in 3 months as scheduled.  MIKY Feliz RN     Remote ICD transmission    8/5/20 ECHO     Interpretation Summary  LVAD cannula was seen in the expected anatomic position in the LV apex.  HM2 at 9400RPM.  LVIDd 61mm.  Septum normal.  Aortic valve remain closed.  Normal flow velocities.  Global right ventricular function is mildly to moderately reduced.  The inferior vena cava is normal.  No pericardial effusion is present.    1/20/2020 [facility]  Interpretation Summary  Technically difficult study. Patients heart rate is in the 120s-150s during  the study.  HM2 LVAD is present and is funcioning at 9400RPM     Severely (EF 10-15%) reduced left ventricular function is present. Severe left  ventricular dilation is present. LVIDd is 6.6cm LVAD cannula in the apex is  not well visualized. Inflow and outflow velocities could not be obtained.  Aortic valve appears to open minimally with each beat.  Septum is probably  midline but again difficult to visualize.  Global right ventricular function is moderately to severely reduced.  IVC diameter >2.1 cm collapsing <50% with sniff suggests a high RA pressure  estimated at 15 mmHg or greater. No pericardial effusion is present.     Compared to prior study on 1/16/20, LVIDd appears slightly larger. Patient is  severely tachycardic now. IVC appears more dilated.    7/24/2019 [facility]   Time  Systolic  Diastolic  Mean  A Wave  V Wave  EDP  Max dp/dt  HR    RA Pressures  10:04 AM    10 mmHg     89791 mmHg     10617 mmHg       55 bpm       RV Pressures   9:42 AM        384 mmHg/sec         10:01 AM  25 mmHg         10 mmHg      39 bpm       PA Pressures  10:01 AM  25 mmHg     16 mmHg     19 mmHg         80 bpm       PCW Pressures  10:02 AM    11 mmHg     11087 mmHg     75671 mmHg       68 bpm          Time  TDCO  TDCI  Kee C.O.  Kee C.I.  Kee HR    Cardiac Output Results   9:42 AM  4.63 L/min     2.02 L/min/m2     4.62 L/min     2.01 L/min/m2            _____________________________________________________________________________    Assessment and Plan:   Jim Willingham is a 65 year old male with a past medical history of NICM (EF 20%) s/p HM II LVAD placement (6/19/17) at  (obesity) Other relavant history includes chronic kidney disease stage III, diabetes mellitus type 2, RV failure, CECILIA, obesity, hypertension, COPD, asthma. He presents for LVAD f/u.    He presents today for weight gain and RESENDIZ. He has had escalating diuretic doses recently and he started to lose weight on this.  I do think that he will try out somewhat soon, on Monday he should decrease back to 4 mg twice a day.  At that point he should decrease his potassium to 20 mEq once a day.    We have been holding off on his PFTs and 6-minute walk due to Covid, I would like him to schedule this especially given the escalating Covid cases likely that things may get shot down on the next few weeks to months.  I  would like him to have as much of the eval done as possible.  Ideally he will be as dry as possible which we will do our best to achieve prior to the testing.    #  Chronic systolic heart failure secondary to NICM  Stage D  NYHA Class IIIA  ACEi/ARB: Losartan stopped d/t fluctuating renal function, continue hydralazine.  BB: Stopped during recent admission  Aldosterone antagonist: Stopped d/t hyperkalemia.  SCD prophylaxis: ICD  Fluid status:  hypervolemic, conintue bumex 6 mg BID for now, on Monday decrease to 4mg BID     #  S/P LVAD implant inititally as DT due to obesity, now within goal BMI range and being evuated for transplant  Anticoagulation: Warfarin with INR goal of 2-3,  dosing per coumadin clinic  Antiplatelet: Aspirin 81 mg daily  MAP: Goal 65-85  LDH: Stable, 391, BL around 380-450    VAD Interrogation on 11/5/2020. VAD interrogation reviewed with VAD coordinator. Agree with findings. Occasional PI events. No power spikes, speed drops, or other findings suspicious of pump malfunction noted.     # RV failure  - Now off metoprolol, continue amidoarone  - Ongoing transplant evaluation     # Obesity:  Recommend weight loss with a goal weight of 255 which he has now met. Continue additional weight loss as able    # A. Fib  New in Jan. Now S/p Cardioversion in 5/2020. Symptoms improved dramatically. In sinus on last ICD check. No a. Fib episodes since cardioversion  - Continue amiodarone and coumadin     # CECILIA: Continue CPAP.       # Chronic kidney disease: Recent WALTER with peak of 2.04, continues to improve on the higher bumex dose so was likely cardiorenal. 1.83 today and down trending. BL in the past year has been 1.2-1.4  - Bumex decrease as above  - Recheck in 1 week    # Iron deficiency anemia  - Have arranged for IV iron infusions: Venofer 300 x3, dose weekly  - Recheck Hgb next week, call for bleeding symptoms    Follow-up  - Needs PFT and 6 min walk for transplant eval  - RTC in December as  scheduled  - Labs and weight check on Monday (WALTER, continued higher bumex dose, planned to drop to 4mg bID Monday)    Didi Butler PA-C

## 2020-11-05 NOTE — PROGRESS NOTES
ANTICOAGULATION FOLLOW-UP CLINIC VISIT    Patient Name:  Jim Willingham  Date:  2020  Contact Type:  Telephone    SUBJECTIVE:  Patient Findings     Comments:  Left message for Jim.        Clinical Outcomes     Comments:  Left message for Jim.           OBJECTIVE    Recent labs: (last 7 days)     20  0907   INR 2.28*       ASSESSMENT / PLAN  INR assessment THER    Recheck INR In: 5 DAYS    INR Location Clinic      Anticoagulation Summary  As of 2020    INR goal:  2.0-3.0   TTR:  55.6 % (10.9 mo)   INR used for dosin.28 (2020)   Warfarin maintenance plan:  7.5 mg (5 mg x 1.5) every Sun, Thu; 5 mg (5 mg x 1) all other days   Full warfarin instructions:  7.5 mg every Sun, Thu; 5 mg all other days   Weekly warfarin total:  40 mg   Plan last modified:  Delisa Hillman RN (9/15/2020)   Next INR check:  11/10/2020   Priority:  Critical   Target end date:  Indefinite    Indications    LVAD (left ventricular assist device) present (H) [Z95.811]  Long-term (current) use of anticoagulants [Z79.01] [Z79.01]  Chronic systolic congestive heart failure (H) [I50.22]             Anticoagulation Episode Summary     INR check location:      Preferred lab:      Send INR reminders to:  [facility]    Comments:  +++Patient drawn at Home care visit Q Mon. (8/10/20)+++  Labs drawn either at [facility] or [facility]  LVAD placed 17   II  ASA 81 mg daily      Anticoagulation Care Providers     Provider Role Specialty Phone number    Delisa Montgomery MD Referring Advanced Heart Failure and Transplant Cardiology [phone]            See the Encounter Report to view Anticoagulation Flowsheet and Dosing Calendar (Go to Encounters tab in chart review, and find the Anticoagulation Therapy Visit)    INR/CFX/F2 RESULT:INR result is 2.28    ASSESSMENT:Left message for patient with results and dosing recommendations. Asked patient to call back to report any missed doses, falls, signs  and symptoms of bleeding or clotting, any changes in health, medication, or diet. Asked patient to call back with any questions or concerns.    DOSING ADJUSTMENT:continue maintenance dose.      NEXT INR/FACTOR X OR FACTOR II:11/10 at next home infusion    PROTOCOL FOLLOWED:goal 2-3  Patient had LVAD placed on:   6/9/17  Type of LVAD: HM 2  Patient's current Aspirin dose: 81mg  LVAD Protocol followed:  Yes.   If Not Followed Explanation:  Brendan Garcia, RN

## 2020-11-05 NOTE — PROGRESS NOTES
This is a recent snapshot of the patient's [facility] medical record.  For current drug dose and complete information and questions, call [phone] or In Basket pool, fv home infusion ([id number])  CSN Number:  [id number]

## 2020-11-05 NOTE — NURSING NOTE
Chief Complaint   Patient presents with     Follow Up       Vitals were taken and medications were reconciled.     Bre Shirley  9:22 AM

## 2020-11-05 NOTE — PATIENT INSTRUCTIONS
Medications:  1. Keep taking Bumex, 6mg, twice a day. On Monday, drop to 4mg twice a day.   2. On Monday, drop your potassium to 20mEq once a day.     Follow-up:  1. Please schedule PFTs and a 6 minute walk today.   2. We'll see you in December.   3. Get labs next Monday.     Instructions:  1. Please clean your batteries when you get home.     Page the VAD Coordinator on call if you gain more than 3 lb in a day or 5 in a week. Please also page if you feel unwell or have alarms.     Great to see you in clinic today. To Page the VAD Coordinator on call, dial [phone] option #[phone] and ask to speak to the VAD coordinator on call.

## 2020-11-05 NOTE — NURSING NOTE
1). PUMP DATA  Primary controller serial number: [id number]     II:   Flow: 7.1 L/min,    Speed: 9400 RPMs,     PI: 4.4 ,  Power: 6.9 Manuel,      Primary controller   Back up battery: Patient use: 45, Replace in: 16  Months     Data downloaded: No   Equipment and driveline assessed for damage: Yes     Back up : Serial number: [id number]  Back up battery: Patient use: 4 Replace in: 19  Months  Programmed settings identical to the settings on the primary controller :Yes      Education complete: Yes   Charge the BACKUP controller s backup battery every 6 months  Perform a self test on BACKUP every 6 months  Change the MPU s batteries every 6 months:Yes  Have equipment serviced yearly (if applicable):Yes    2). ALARMS  Alarms reported by patient since last pump evaluation: No  Alarms or other finding noted during pump data history and alarm download: Pt has occasional PI events. All PIs are WNL. There are no alarms on his controller history.     Action Taken:  Reviewed data with patient: Yes      3). DRESSING CHANGE / DRIVELINE ASSESSMENT  Dressing change completed today: No  Appearance of Driveline site: Per pt, site is C/D/I , no redness, swelling, tenderness, or drainage.     Driveline stabilization: Method: [employer]  [ Teaching reinforced on need for stabilization of Driveline. ]

## 2020-11-09 ENCOUNTER — ANTICOAGULATION THERAPY VISIT (OUTPATIENT)
Dept: ANTICOAGULATION | Facility: CLINIC | Age: 65
End: 2020-11-09

## 2020-11-09 DIAGNOSIS — Z79.01 LONG TERM CURRENT USE OF ANTICOAGULANT THERAPY: ICD-10-CM

## 2020-11-09 DIAGNOSIS — I50.22 CHRONIC SYSTOLIC CONGESTIVE HEART FAILURE (H): ICD-10-CM

## 2020-11-09 DIAGNOSIS — Z95.811 LVAD (LEFT VENTRICULAR ASSIST DEVICE) PRESENT (H): ICD-10-CM

## 2020-11-09 LAB
ANION GAP SERPL CALCULATED.3IONS-SCNC: 5 MMOL/L (ref 3–14)
BUN SERPL-MCNC: 38 MG/DL (ref 7–30)
CALCIUM SERPL-MCNC: 8.5 MG/DL (ref 8.5–10.1)
CHLORIDE SERPL-SCNC: 102 MMOL/L (ref 94–109)
CO2 SERPL-SCNC: 27 MMOL/L (ref 20–32)
CREAT SERPL-MCNC: 1.94 MG/DL (ref 0.66–1.25)
GFR SERPL CREATININE-BSD FRML MDRD: 36 ML/MIN/{1.73_M2}
GLUCOSE SERPL-MCNC: 112 MG/DL (ref 70–99)
INR PPP: 2.49 (ref 0.86–1.14)
POTASSIUM SERPL-SCNC: 3.7 MMOL/L (ref 3.4–5.3)
SODIUM SERPL-SCNC: 134 MMOL/L (ref 133–144)

## 2020-11-09 PROCEDURE — 80048 BASIC METABOLIC PNL TOTAL CA: CPT | Performed by: PHYSICIAN ASSISTANT

## 2020-11-09 PROCEDURE — 85610 PROTHROMBIN TIME: CPT | Performed by: PHYSICIAN ASSISTANT

## 2020-11-09 PROCEDURE — 36415 COLL VENOUS BLD VENIPUNCTURE: CPT | Performed by: PHYSICIAN ASSISTANT

## 2020-11-09 NOTE — PROGRESS NOTES
ANTICOAGULATION FOLLOW-UP CLINIC VISIT    Patient Name:  Jim Willingham  Date:  2020  Contact Type:  Telephone    SUBJECTIVE:         OBJECTIVE    Recent labs: (last 7 days)     20  1249   INR 2.49*       ASSESSMENT / PLAN  No question data found.  Anticoagulation Summary  As of 2020    INR goal:  2.0-3.0   TTR:  56.0 % (10.9 mo)   INR used for dosin.49 (2020)   Warfarin maintenance plan:  7.5 mg (5 mg x 1.5) every Sun, Thu; 5 mg (5 mg x 1) all other days   Full warfarin instructions:  7.5 mg every Sun, Thu; 5 mg all other days   Weekly warfarin total:  40 mg   No change documented:  Maru Alonso RN   Plan last modified:  Delisa Hillman RN (9/15/2020)   Next INR check:  2020   Priority:  Critical   Target end date:  Indefinite    Indications    LVAD (left ventricular assist device) present (H) [Z95.811]  Long-term (current) use of anticoagulants [Z79.01] [Z79.01]  Chronic systolic congestive heart failure (H) [I50.22]             Anticoagulation Episode Summary     INR check location:      Preferred lab:      Send INR reminders to:  [facility]    Comments:  +++Patient drawn at Home care visit Q Mon. (8/10/20)+++  Labs drawn either at [facility] or [facility]  LVAD placed 17   II  ASA 81 mg daily      Anticoagulation Care Providers     Provider Role Specialty Phone number    Delisa Montgomery MD Referring Advanced Heart Failure and Transplant Cardiology [phone]            See the Encounter Report to view Anticoagulation Flowsheet and Dosing Calendar (Go to Encounters tab in chart review, and find the Anticoagulation Therapy Visit)    Spoke with patient.     Maru Alonso, RN

## 2020-11-10 LAB
BACTERIA SPEC CULT: NO GROWTH
SPECIMEN SOURCE: NORMAL

## 2020-11-13 ENCOUNTER — CARE COORDINATION (OUTPATIENT)
Dept: CARDIOLOGY | Facility: CLINIC | Age: 65
End: 2020-11-13

## 2020-11-13 ENCOUNTER — VIRTUAL VISIT (OUTPATIENT)
Dept: RHEUMATOLOGY | Facility: CLINIC | Age: 65
End: 2020-11-13
Attending: INTERNAL MEDICINE
Payer: COMMERCIAL

## 2020-11-13 VITALS — WEIGHT: 229 LBS | HEIGHT: 68 IN | BODY MASS INDEX: 34.71 KG/M2

## 2020-11-13 DIAGNOSIS — M1A.3790 CHRONIC GOUT DUE TO RENAL IMPAIRMENT INVOLVING FOOT WITHOUT TOPHUS, UNSPECIFIED LATERALITY: Primary | ICD-10-CM

## 2020-11-13 DIAGNOSIS — I50.22 CHRONIC SYSTOLIC CONGESTIVE HEART FAILURE (H): ICD-10-CM

## 2020-11-13 DIAGNOSIS — Z95.811 LVAD (LEFT VENTRICULAR ASSIST DEVICE) PRESENT (H): Primary | ICD-10-CM

## 2020-11-13 DIAGNOSIS — G56.03 CARPAL TUNNEL SYNDROME ON BOTH SIDES: ICD-10-CM

## 2020-11-13 PROCEDURE — 99213 OFFICE O/P EST LOW 20 MIN: CPT | Mod: GT | Performed by: INTERNAL MEDICINE

## 2020-11-13 RX ORDER — BUMETANIDE 0.25 MG/ML
5 INJECTION INTRAMUSCULAR; INTRAVENOUS ONCE
Status: DISCONTINUED | OUTPATIENT
Start: 2020-11-16 | End: 2020-11-16 | Stop reason: ALTCHOICE

## 2020-11-13 RX ORDER — PREDNISONE 5 MG/1
5 TABLET ORAL DAILY
Qty: 30 TABLET | Refills: 2 | Status: ON HOLD | OUTPATIENT
Start: 2020-11-13 | End: 2021-05-31

## 2020-11-13 RX ORDER — BUMETANIDE 0.25 MG/ML
5 INJECTION INTRAMUSCULAR; INTRAVENOUS ONCE
Status: CANCELLED
Start: 2020-11-13 | End: 2020-11-13

## 2020-11-13 RX ORDER — POTASSIUM CHLORIDE 1500 MG/1
20 TABLET, EXTENDED RELEASE ORAL ONCE
Status: DISCONTINUED | OUTPATIENT
Start: 2020-11-16 | End: 2020-11-16 | Stop reason: CLARIF

## 2020-11-13 ASSESSMENT — MIFFLIN-ST. JEOR: SCORE: 1808.24

## 2020-11-13 NOTE — PROGRESS NOTES
Pt had BMP drawn on 11/9 as requested. Spoke with pt on 11/10 to review results and discuss symptoms. Pt reports he lost only about 1lb since his visit in clinic. He decreased Bumex to 4 BID starting 11/10. He still feels mildly SOB with mild abd distention. He feels pretty limited.   Discussed pt symptoms with ROGER Rivera. She recommended that pt return to higher dose of bumex 6mg po BID and KCL 20mEq BID. PA also discussed possibly administering IV diuretics in clinic.   Called pt back on 11/11 to review new instructions. Pt verbalized understanding. He is willing to get IV diuretics.  Received orders from PA today for pt to receive Bumex 5mg IV x1, as well as KCL 20mEq po x1 with injection.   Called pt back to review options and timing of IV diuretics. Pt doesn't have childcare today and cannot come to clinic for medication. Pt is scheduled for 11/16 at 12:30 to receive bumex 5mg IV x1 and KCL 20mEq x1.

## 2020-11-13 NOTE — PROGRESS NOTES
Outpatient Rheumatology Video Follow-up  This visit was conducted via synchronous VIDEO visit due to the current COVID-19 crisis to reduce patient risk.  Verbal consent was obtained and is documented below.    Name: Jim Willingham    MRN [id number]   Today's date: 11/13/2020         Reason for visit: Gout follow-up        Assessment & Plan:   65 year old male with a history of gout, CTS, NICM, (EF 20%) s/p LVAD placement (6/19/17), afib, RV failure, CKD3, DM2 c/b neuropathy, CECILIA, HTN, asthma who was evaluated by me for assistance with gout management while inpatient with staph hominis bacteremia. Most recent uric acid at that time was from 1/21/20 at was 9. He had been managed on 20mg prednisone and 100mg allopurinol for 3 years without titration. During his inpatient stay, we increased his allopurinol to 200mg daily and started his prednisone taper. I see him today for phone visit follow-up.  He is now on 300mg allopurinol daily and remains 5mg of prednisone daily for paradoxical flare prophylaxis. Due to his co-morbidities, his serum uric acid goal is <5. Most recent check shows uric acid of 4.9.  Had one flare of knee inflammatory arthritis consistent with gout since I saw him, he treated with short course of prednisone with resolution. He has had worsening of his carpal tunnel symptoms with his decreased gabapentin dose, though this was increased again. This is his biggest complaint today. Had EMG done in past which showed CTS. He requests hand ortho referral for injection vs release.    PLAN:  1) Continue prednisone 5mg daily for paradoxical flare prophylaxis. Would typically continue for 4-6 months at this point, though he would like to try to stop at 3 months for better glucose control. I think this is reasonable  2) If flare prednisone 30mg daily x3 days, lowest / shortest duration given DMII, will call if he flares to let us know. He has prednisone at home to use for this  3) Stay on 300mg allopurinol    4) Standing CBC/CMP/sUA ordered  5) RTC in 3 months with labs a few days prior. Likely to stop prednisone at that time.  6) ortho hand referral for carpal tunnel injection vs release    Elbert Pettit MD  Rheumatology      Subjective:   One flare of inflammatory arthritis since his last visit in his knees, consistent with prior gout flares. Treated with short course of increased steroids. Has also experienced much worse bilateral hand paresthesias affecting digits 1-3. Had EMG done with first onset of symptoms years ago, which confirmed CTS. Was hesitant to undergo procedure at that time, though symptoms have progressed despite going up higher on his gabapentin, and he wishes for referral to hand ortho now. Has remained on 300mg allopurinol daily since his last appointment. And with exception of the short steroid course for his flare, he has been maintained on 5mg prednisone daily. He has noticed a very big difference in his glucose control since we have come down on prednisone    14 point review of systems collected and negative if not documented above.    HPI from initial consult on 9/21/2020  Jim Willingham is a 65 year old with a hx of gout, NICM (EF20%) s/p LVAD placement (6/19/17) d/t obesity, A fib, RV failure, CKD3, DM2 c/b neuropathy, CECILIA, HTN, asthma who presents with a few days of myalgias, chills, and headaches. Patient states that he hasn't had a gout flare in more than 3 years, ever since he was started on allopurinol. When he does have a flare, it is usually in his big toes on his feet. He states that he has been taking 20mg prednisone every day for 5 years for his carpal tunnel syndrome, which was prescribed by his PCP Dr. Salas (per chart review). He has tried to taper himself off of the 20mg steroids a number of times, most recently about two months ago, and will notice that his hand will get much more tingly, numb, and painful. The pain in his hands improves only with combination therapy  with gabapentin and steroids. He denies taking oral prednisone for his respiratory issues, although notes that he has occasionally been increased on his prednisone dose for flares of his respiratory disease.     Per chart review, Mr. Willingham was hospitalized for LVAD placement in June 2017, at which point he had a gout flair and was seen my rheumatology consult service and was discharged on 40 mg prednisone for 5 days as needed for a flair in addition to allopurinol and colchicine.; He was then seen on 7/25/17 for f/u for a gout flair that started on 7/17/20 and was prescribed a 20 mg prednisone dose for 3 days, to be tapered to 10 mg for 3 days and then 5 mg for 3 days.     Patient denies any vision changes, headaches, focal weakness or numbness.  No hematuria or hematochezia.      Treatment History  - Allopurinol 100 mg daily  - Prednisone 20 mg daily    Past Medical History  Past Medical History:   Diagnosis Date     Benign essential hypertension 5/11/2017     Bilateral carpal tunnel syndrome 11/2/2020     Cardiomyopathy, unspecified (H) 5/8/2017     CKD (chronic kidney disease) stage 3, GFR 30-59 ml/min 5/11/2017     COPD (chronic obstructive pulmonary disease) (H) 11/2/2020     Depression 5/11/2017     Diabetes mellitus (H) 5/11/2017     Gouty arthropathy, chronic, without tophi 11/2/2020     H/O gastric bypass 5/11/2017     ICD (implantable cardioverter-defibrillator), biventricular, in situ 5/11/2017     LVAD (left ventricular assist device) present (H)      Major depression, recurrent, chronic (H) 11/2/2020     NICM (nonischemic cardiomyopathy) (H)/ EF 20% 5/11/2017    ECHO: LVEDd. 7.66 cm, Restrictive pattern , Severe mitral valve regurgitation     CECILIA (obstructive sleep apnea) 5/11/2017     Paroxysmal atrial fibrillation (H) 5/11/2017     Paroxysmal VT (H) 5/11/2017     Rotator cuff tear arthropathy of both shoulders 11/2/2020     Uncomplicated asthma      Vitamin B12 deficiency (non anemic) 5/11/2017      Past Surgical History  Past Surgical History:   Procedure Laterality Date     ANESTHESIA CARDIOVERSION N/A 5/11/2020    Procedure: ANESTHESIA, FOR CARDIOVERSION @1100;  Surgeon: GENERIC ANESTHESIA PROVIDER;  Location: [facility] OR     CV RIGHT HEART CATH N/A 7/24/2019    Procedure: CV RIGHT HEART CATH;  Surgeon: Renu Sears MD;  Location:  HEART CARDIAC CATH LAB     CV RIGHT HEART CATH N/A 8/5/2020    Procedure: CV RIGHT HEART CATH;  Surgeon: Nicola Seth MD;  Location:  HEART CARDIAC CATH LAB     GI SURGERY  2003    [facility]     INSERT VENTRICULAR ASSIST DEVICE LEFT (HEARTMATE II) N/A 6/19/2017    Procedure: INSERT VENTRICULAR ASSIST DEVICE LEFT (HEARTMATE II);  Median Sternotomy Heartmate II Left Ventricular Assist Device Insertion on Pump Oxygenator;  Surgeon: Ronnie Quigley MD;  Location: [facility] OR     ORTHOPEDIC SURGERY  1994    right knee wired     PICC DOUBLE LUMEN PLACEMENT Right 09/23/2020    5FR PICC DL. Length-43cm (1cm out).       Medications  Current Outpatient Medications   Medication     acetaminophen (TYLENOL) 325 MG tablet     albuterol (PROAIR HFA) 108 (90 Base) MCG/ACT inhaler     allopurinol (ZYLOPRIM) 300 MG tablet     amiodarone (PACERONE) 200 MG tablet     aspirin 81 MG chewable tablet     blood glucose monitoring (ONE TOUCH ULTRA 2) meter device kit     blood glucose VI test strip     bumetanide (BUMEX) 0.5 MG tablet     citalopram (CELEXA) 10 MG tablet     cyanocobalamin (VITAMIN B12) 1000 MCG/ML injection     fluticasone-vilanterol (BREO ELLIPTA) 200-25 MCG/INH oral inhaler     gabapentin (NEURONTIN) 300 MG capsule     hydrALAZINE (APRESOLINE) 10 MG tablet     insulin glargine (LANTUS SOLOSTAR) 100 UNIT/ML pen     insulin lispro (HUMALOG KWIKPEN) 100 UNIT/ML (1 unit dial) KWIKPEN     insulin pen needle (32G X 4 MM) 32G X 4 MM miscellaneous     metolazone (ZAROXOLYN) 2.5 MG tablet     montelukast (SINGULAIR) 10 MG tablet     pantoprazole (PROTONIX) 40 MG EC tablet     potassium  chloride ER (KLOR-CON M) 10 MEQ CR tablet     umeclidinium (INCRUSE ELLIPTA) 62.5 MCG/INH oral inhaler     warfarin ANTICOAGULANT (COUMADIN) 5 MG tablet     zinc sulfate (ZINCATE) 220 (50 Zn) MG capsule     traMADol (ULTRAM) 50 MG tablet     No current facility-administered medications for this visit.      Allergies  Allergies   Allergen Reactions     Beer Shortness Of Breath     Grass Shortness Of Breath     Ace Inhibitors Cough     Dust Mites Other (See Comments)     Asthma     Mold Other (See Comments)     Asthma     Penicillins Other (See Comments)     Unknown - childhood exposure    Tolerated Zosyn -2020     Sulfa Drugs Other (See Comments) and Unknown     Unknown childhood reaction     Family History  Family History   Problem Relation Age of Onset     Cerebrovascular Disease Mother 64     Diabetes Mother      Hypertension Mother      Coronary Artery Disease Father      Diabetes Type 2  Father      Obesity Brother      Obesity Brother      Cerebrovascular Disease Daughter 40   No other family history of autoimmune disease    Social History  Social History     Tobacco Use     Smoking status: Former Smoker     Quit date:      Years since quittin.8     Smokeless tobacco: Never Used   Substance Use Topics     Alcohol use: No     Drug use: No        Objective:   No vitals for video visit  GEN: Sitting up at table, NAD, pleasant as usual  HEENT: no facial rash, sclera clear, no inflammatory nasal bridge or external ear changes  CV: No upper extremity edema  Pulm: speaking in full sentences, no cough, no audible wheeze  Abdomen: not distended  Skin: no acute cutaneous lesions appreciated  MSK: no erythema/edema/warmth of any joints. Full ROM in upper extremities    Labs:  2020  Cr 1.94  sUA 4.9  Cholesterol 209  Trig 91  HDL 87    A1C 5.7  WBC 4.8  HGB 10      10/9/20  WBC 6.2  HGB 10.6    Cr 1.34  GFR 65    2020 serum uric acid 9       Jim Willingham is a 65 year old  "male who is being evaluated via a billable video visit.      The patient has been notified of following:     \"This video visit will be conducted via a call between you and your physician/provider. We have found that certain health care needs can be provided without the need for an in-person physical exam.  This service lets us provide the care you need with a video conversation.  If a prescription is necessary we can send it directly to your pharmacy.  If lab work is needed we can place an order for that and you can then stop by our lab to have the test done at a later time.    Video visits are billed at different rates depending on your insurance coverage.  Please reach out to your insurance provider with any questions.    If during the course of the call the physician/provider feels a video visit is not appropriate, you will not be charged for this service.\"    Patient has given verbal consent for Video visit? Yes  How would you like to obtain your AVS? MyChart  If you are dropped from the video visit, the video invite should be resent to: Other e-mail: USE DOXIMITY - PATIENT IS IN THE WAITING ROOM  Will anyone else be joining your video visit? No        Video-Visit Details    Type of service:  Video Visit    Start: 11/13/2020 02:01 pm   Stop: 11/13/2020 02:20 pm    Originating Location (pt. Location): Home    Distant Location (provider location):  [facility] [place]     Platform used for Video Visit:Giacomo Pettit MD  Rheumatology          "

## 2020-11-13 NOTE — LETTER
11/13/2020       RE: Jim Willingham  [address] [facility] [id number]     Dear Colleague,    Thank you for referring your patient, Jim Willingham, to the [facility] [place] at [facility]. Please see a copy of my visit note below.      Outpatient Rheumatology Video Follow-up  This visit was conducted via synchronous VIDEO visit due to the current COVID-19 crisis to reduce patient risk.  Verbal consent was obtained and is documented below.    Name: Jim Willingham    MRN [id number]   Today's date: 11/13/2020         Reason for visit: Gout follow-up        Assessment & Plan:   65 year old male with a history of gout, CTS, NICM, (EF 20%) s/p LVAD placement (6/19/17), afib, RV failure, CKD3, DM2 c/b neuropathy, CECILIA, HTN, asthma who was evaluated by me for assistance with gout management while inpatient with staph hominis bacteremia. Most recent uric acid at that time was from 1/21/20 at was 9. He had been managed on 20mg prednisone and 100mg allopurinol for 3 years without titration. During his inpatient stay, we increased his allopurinol to 200mg daily and started his prednisone taper. I see him today for phone visit follow-up.  He is now on 300mg allopurinol daily and remains 5mg of prednisone daily for paradoxical flare prophylaxis. Due to his co-morbidities, his serum uric acid goal is <5. Most recent check shows uric acid of 4.9.  Had one flare of knee inflammatory arthritis consistent with gout since I saw him, he treated with short course of prednisone with resolution. He has had worsening of his carpal tunnel symptoms with his decreased gabapentin dose, though this was increased again. This is his biggest complaint today. Had EMG done in past which showed CTS. He requests hand ortho referral for injection vs release.    PLAN:  1) Continue prednisone 5mg daily for paradoxical flare prophylaxis. Would typically continue for 4-6 months at  this point, though he would like to try to stop at 3 months for better glucose control. I think this is reasonable  2) If flare prednisone 30mg daily x3 days, lowest / shortest duration given DMII, will call if he flares to let us know. He has prednisone at home to use for this  3) Stay on 300mg allopurinol   4) Standing CBC/CMP/sUA ordered  5) RTC in 3 months with labs a few days prior. Likely to stop prednisone at that time.  6) ortho hand referral for carpal tunnel injection vs release    Elbert Pettit MD  Rheumatology      Subjective:   One flare of inflammatory arthritis since his last visit in his knees, consistent with prior gout flares. Treated with short course of increased steroids. Has also experienced much worse bilateral hand paresthesias affecting digits 1-3. Had EMG done with first onset of symptoms years ago, which confirmed CTS. Was hesitant to undergo procedure at that time, though symptoms have progressed despite going up higher on his gabapentin, and he wishes for referral to hand ortho now. Has remained on 300mg allopurinol daily since his last appointment. And with exception of the short steroid course for his flare, he has been maintained on 5mg prednisone daily. He has noticed a very big difference in his glucose control since we have come down on prednisone    14 point review of systems collected and negative if not documented above.    HPI from initial consult on 9/21/2020  Jim Willingham is a 65 year old with a hx of gout, NICM (EF20%) s/p LVAD placement (6/19/17) d/t obesity, A fib, RV failure, CKD3, DM2 c/b neuropathy, CECILIA, HTN, asthma who presents with a few days of myalgias, chills, and headaches. Patient states that he hasn't had a gout flare in more than 3 years, ever since he was started on allopurinol. When he does have a flare, it is usually in his big toes on his feet. He states that he has been taking 20mg prednisone every day for 5 years for his carpal tunnel syndrome, which  was prescribed by his PCP Dr. Salas (per chart review). He has tried to taper himself off of the 20mg steroids a number of times, most recently about two months ago, and will notice that his hand will get much more tingly, numb, and painful. The pain in his hands improves only with combination therapy with gabapentin and steroids. He denies taking oral prednisone for his respiratory issues, although notes that he has occasionally been increased on his prednisone dose for flares of his respiratory disease.     Per chart review, Mr. Willingham was hospitalized for LVAD placement in June 2017, at which point he had a gout flair and was seen my rheumatology consult service and was discharged on 40 mg prednisone for 5 days as needed for a flair in addition to allopurinol and colchicine.; He was then seen on 7/25/17 for f/u for a gout flair that started on 7/17/20 and was prescribed a 20 mg prednisone dose for 3 days, to be tapered to 10 mg for 3 days and then 5 mg for 3 days.     Patient denies any vision changes, headaches, focal weakness or numbness.  No hematuria or hematochezia.      Treatment History  - Allopurinol 100 mg daily  - Prednisone 20 mg daily    Past Medical History  Past Medical History:   Diagnosis Date     Benign essential hypertension 5/11/2017     Bilateral carpal tunnel syndrome 11/2/2020     Cardiomyopathy, unspecified (H) 5/8/2017     CKD (chronic kidney disease) stage 3, GFR 30-59 ml/min 5/11/2017     COPD (chronic obstructive pulmonary disease) (H) 11/2/2020     Depression 5/11/2017     Diabetes mellitus (H) 5/11/2017     Gouty arthropathy, chronic, without tophi 11/2/2020     H/O gastric bypass 5/11/2017     ICD (implantable cardioverter-defibrillator), biventricular, in situ 5/11/2017     LVAD (left ventricular assist device) present (H)      Major depression, recurrent, chronic (H) 11/2/2020     NICM (nonischemic cardiomyopathy) (H)/ EF 20% 5/11/2017    ECHO: LVEDd. 7.66 cm, Restrictive pattern  , Severe mitral valve regurgitation     CECILIA (obstructive sleep apnea) 5/11/2017     Paroxysmal atrial fibrillation (H) 5/11/2017     Paroxysmal VT (H) 5/11/2017     Rotator cuff tear arthropathy of both shoulders 11/2/2020     Uncomplicated asthma      Vitamin B12 deficiency (non anemic) 5/11/2017     Past Surgical History  Past Surgical History:   Procedure Laterality Date     ANESTHESIA CARDIOVERSION N/A 5/11/2020    Procedure: ANESTHESIA, FOR CARDIOVERSION @1100;  Surgeon: GENERIC ANESTHESIA PROVIDER;  Location: [facility] OR     CV RIGHT HEART CATH N/A 7/24/2019    Procedure: CV RIGHT HEART CATH;  Surgeon: Renu Sears MD;  Location:  HEART CARDIAC CATH LAB     CV RIGHT HEART CATH N/A 8/5/2020    Procedure: CV RIGHT HEART CATH;  Surgeon: Nicola Seth MD;  Location:  HEART CARDIAC CATH LAB     GI SURGERY  2003    [facility]     INSERT VENTRICULAR ASSIST DEVICE LEFT (HEARTMATE II) N/A 6/19/2017    Procedure: INSERT VENTRICULAR ASSIST DEVICE LEFT (HEARTMATE II);  Median Sternotomy Heartmate II Left Ventricular Assist Device Insertion on Pump Oxygenator;  Surgeon: Ronnie Quigley MD;  Location: [facility] OR     ORTHOPEDIC SURGERY  1994    right knee wired     PICC DOUBLE LUMEN PLACEMENT Right 09/23/2020    5FR PICC DL. Length-43cm (1cm out).       Medications  Current Outpatient Medications   Medication     acetaminophen (TYLENOL) 325 MG tablet     albuterol (PROAIR HFA) 108 (90 Base) MCG/ACT inhaler     allopurinol (ZYLOPRIM) 300 MG tablet     amiodarone (PACERONE) 200 MG tablet     aspirin 81 MG chewable tablet     blood glucose monitoring (ONE TOUCH ULTRA 2) meter device kit     blood glucose VI test strip     bumetanide (BUMEX) 0.5 MG tablet     citalopram (CELEXA) 10 MG tablet     cyanocobalamin (VITAMIN B12) 1000 MCG/ML injection     fluticasone-vilanterol (BREO ELLIPTA) 200-25 MCG/INH oral inhaler     gabapentin (NEURONTIN) 300 MG capsule     hydrALAZINE (APRESOLINE) 10 MG tablet     insulin glargine (LANTUS  SOLOSTAR) 100 UNIT/ML pen     insulin lispro (HUMALOG KWIKPEN) 100 UNIT/ML (1 unit dial) KWIKPEN     insulin pen needle (32G X 4 MM) 32G X 4 MM miscellaneous     metolazone (ZAROXOLYN) 2.5 MG tablet     montelukast (SINGULAIR) 10 MG tablet     pantoprazole (PROTONIX) 40 MG EC tablet     potassium chloride ER (KLOR-CON M) 10 MEQ CR tablet     umeclidinium (INCRUSE ELLIPTA) 62.5 MCG/INH oral inhaler     warfarin ANTICOAGULANT (COUMADIN) 5 MG tablet     zinc sulfate (ZINCATE) 220 (50 Zn) MG capsule     traMADol (ULTRAM) 50 MG tablet     No current facility-administered medications for this visit.      Allergies  Allergies   Allergen Reactions     Beer Shortness Of Breath     Grass Shortness Of Breath     Ace Inhibitors Cough     Dust Mites Other (See Comments)     Asthma     Mold Other (See Comments)     Asthma     Penicillins Other (See Comments)     Unknown - childhood exposure    Tolerated Zosyn -2020     Sulfa Drugs Other (See Comments) and Unknown     Unknown childhood reaction     Family History  Family History   Problem Relation Age of Onset     Cerebrovascular Disease Mother 64     Diabetes Mother      Hypertension Mother      Coronary Artery Disease Father      Diabetes Type 2  Father      Obesity Brother      Obesity Brother      Cerebrovascular Disease Daughter 40   No other family history of autoimmune disease    Social History  Social History     Tobacco Use     Smoking status: Former Smoker     Quit date:      Years since quittin.8     Smokeless tobacco: Never Used   Substance Use Topics     Alcohol use: No     Drug use: No        Objective:   No vitals for video visit  GEN: Sitting up at table, NAD, pleasant as usual  HEENT: no facial rash, sclera clear, no inflammatory nasal bridge or external ear changes  CV: No upper extremity edema  Pulm: speaking in full sentences, no cough, no audible wheeze  Abdomen: not distended  Skin: no acute cutaneous lesions appreciated  MSK: no  "erythema/edema/warmth of any joints. Full ROM in upper extremities    Labs:  11/5/2020  Cr 1.94  sUA 4.9  Cholesterol 209  Trig 91  HDL 87    A1C 5.7  WBC 4.8  HGB 10      10/9/20  WBC 6.2  HGB 10.6    Cr 1.34  GFR 65    Jan 2020 serum uric acid 9       Jim Willingham is a 65 year old male who is being evaluated via a billable video visit.      The patient has been notified of following:     \"This video visit will be conducted via a call between you and your physician/provider. We have found that certain health care needs can be provided without the need for an in-person physical exam.  This service lets us provide the care you need with a video conversation.  If a prescription is necessary we can send it directly to your pharmacy.  If lab work is needed we can place an order for that and you can then stop by our lab to have the test done at a later time.    Video visits are billed at different rates depending on your insurance coverage.  Please reach out to your insurance provider with any questions.    If during the course of the call the physician/provider feels a video visit is not appropriate, you will not be charged for this service.\"    Patient has given verbal consent for Video visit? Yes  How would you like to obtain your AVS? MyChart  If you are dropped from the video visit, the video invite should be resent to: Other e-mail: USE DOXIMITY - PATIENT IS IN THE WAITING ROOM  Will anyone else be joining your video visit? No    Video-Visit Details    Type of service:  Video Visit    Start: 11/13/2020 02:01 pm   Stop: 11/13/2020 02:20 pm    Originating Location (pt. Location): Home    Distant Location (provider location):  [facility] [place]     Platform used for Video Visit:Giacomo Pettit MD  Rheumatology        "

## 2020-11-15 NOTE — PATIENT INSTRUCTIONS
1) Continue 5mg prednisone daily and 300mg allopurinol daily  2) I have placed a referral to hand orthopedics to discuss injection  3) I will see you back for follow-up in 3 months. Please have labs done anytime in the 2 weeks prior to that appointment. They have been ordered    Do not hesitate to reach out with any questions.    Elbert Pettit  Rheumatology

## 2020-11-16 ENCOUNTER — CARE COORDINATION (OUTPATIENT)
Dept: CARDIOLOGY | Facility: CLINIC | Age: 65
End: 2020-11-16

## 2020-11-16 ENCOUNTER — TELEPHONE (OUTPATIENT)
Dept: TRANSPLANT | Facility: CLINIC | Age: 65
End: 2020-11-16

## 2020-11-16 RX ORDER — BUMETANIDE 0.25 MG/ML
5 INJECTION INTRAMUSCULAR; INTRAVENOUS ONCE
Status: CANCELLED
Start: 2020-11-16 | End: 2020-11-16

## 2020-11-16 RX ORDER — POTASSIUM CHLORIDE 1500 MG/1
20 TABLET, EXTENDED RELEASE ORAL ONCE
Status: CANCELLED
Start: 2020-11-16 | End: 2020-11-16

## 2020-11-16 NOTE — TELEPHONE ENCOUNTER
"Performed chart review since meeting with pt 2 mo ago for heart txp eval. Current BMI 34.8. 68\" 229 lbs w/o VAD on 11/13. Pt remains within criteria of <35 for heart txp.      Will continue to follow prn  "

## 2020-11-16 NOTE — PROGRESS NOTES
Pt had a nurse visit scheduled for today for IV push diuretics in the cardiology clinic. Pt accidentally cancelled the visit in my chart when he was cancelling the PFT and 6 min walk appt.   Prior to setting up the nurse visit, pt had been scheduled in the infusion clinic and this appt had not been cancelled yet. Pt will keep the appt at the infusion clinic tomorrow.

## 2020-11-17 ENCOUNTER — INFUSION THERAPY VISIT (OUTPATIENT)
Dept: INFUSION THERAPY | Facility: CLINIC | Age: 65
End: 2020-11-17
Attending: PHYSICIAN ASSISTANT
Payer: COMMERCIAL

## 2020-11-17 ENCOUNTER — ANTICOAGULATION THERAPY VISIT (OUTPATIENT)
Dept: ANTICOAGULATION | Facility: CLINIC | Age: 65
End: 2020-11-17

## 2020-11-17 VITALS
RESPIRATION RATE: 16 BRPM | TEMPERATURE: 98 F | HEART RATE: 72 BPM | SYSTOLIC BLOOD PRESSURE: 88 MMHG | OXYGEN SATURATION: 96 %

## 2020-11-17 DIAGNOSIS — I50.22 CHRONIC SYSTOLIC CONGESTIVE HEART FAILURE (H): Primary | ICD-10-CM

## 2020-11-17 DIAGNOSIS — Z95.811 LVAD (LEFT VENTRICULAR ASSIST DEVICE) PRESENT (H): ICD-10-CM

## 2020-11-17 DIAGNOSIS — Z79.01 LONG TERM CURRENT USE OF ANTICOAGULANT THERAPY: ICD-10-CM

## 2020-11-17 DIAGNOSIS — I50.22 CHRONIC SYSTOLIC CONGESTIVE HEART FAILURE (H): ICD-10-CM

## 2020-11-17 LAB — INR PPP: 2.91 (ref 0.86–1.14)

## 2020-11-17 PROCEDURE — 250N000011 HC RX IP 250 OP 636: Performed by: PHYSICIAN ASSISTANT

## 2020-11-17 PROCEDURE — 96374 THER/PROPH/DIAG INJ IV PUSH: CPT

## 2020-11-17 PROCEDURE — 85610 PROTHROMBIN TIME: CPT | Performed by: STUDENT IN AN ORGANIZED HEALTH CARE EDUCATION/TRAINING PROGRAM

## 2020-11-17 PROCEDURE — 250N000013 HC RX MED GY IP 250 OP 250 PS 637: Performed by: PHYSICIAN ASSISTANT

## 2020-11-17 PROCEDURE — 36415 COLL VENOUS BLD VENIPUNCTURE: CPT

## 2020-11-17 RX ORDER — BUMETANIDE 0.25 MG/ML
5 INJECTION INTRAMUSCULAR; INTRAVENOUS ONCE
Status: CANCELLED
Start: 2020-11-17 | End: 2020-11-17

## 2020-11-17 RX ORDER — BUMETANIDE 0.25 MG/ML
5 INJECTION INTRAMUSCULAR; INTRAVENOUS ONCE
Status: COMPLETED | OUTPATIENT
Start: 2020-11-17 | End: 2020-11-17

## 2020-11-17 RX ORDER — POTASSIUM CHLORIDE 1500 MG/1
20 TABLET, EXTENDED RELEASE ORAL ONCE
Status: COMPLETED | OUTPATIENT
Start: 2020-11-17 | End: 2020-11-17

## 2020-11-17 RX ORDER — POTASSIUM CHLORIDE 1500 MG/1
20 TABLET, EXTENDED RELEASE ORAL ONCE
Status: CANCELLED
Start: 2020-11-17 | End: 2020-11-17

## 2020-11-17 RX ADMIN — BUMETANIDE 5 MG: 0.25 INJECTION INTRAMUSCULAR; INTRAVENOUS at 13:34

## 2020-11-17 RX ADMIN — POTASSIUM CHLORIDE 20 MEQ: 1500 TABLET, EXTENDED RELEASE ORAL at 13:29

## 2020-11-17 NOTE — PROGRESS NOTES
Nursing Note  Jim Willingham presents today to [facility] for:   Chief Complaint   Patient presents with     Infusion     IV Bumex, oral K+     During today's [facility] appointment, orders from Didi Butler PA-C were completed.  Frequency: once     Progress note:  Patient identification verified by name and date of birth.  Assessment completed.  Vitals recorded in Doc Flowsheets.  Patient was provided with education regarding medication/procedure and possible side effects.  Patient verbalized understanding.     present during visit today: Not Applicable.    Premedications: were not ordered.    Drug Waste Record: No    Infusion length and rate:  infusion given over approximately 4 minutes    Labs: were drawn per orders.     Vascular access: Butterfly needle    Post Infusion Assessment:  Patient tolerated infusion without incident.  Site patent and intact, free from redness, edema or discomfort.     Discharge Plan:   Follow up plan of care with: primary care provider,  Discharge instructions were reviewed with patient.  Patient/representative verbalized understanding of discharge instructions and all questions answered.  Patient discharged from [facility] in stable condition.    Nusrat Lee RN    Administrations This Visit     bumetanide (BUMEX) injection 5 mg     Admin Date  11/17/2020 Action  Given Dose  5 mg Route  Intravenous Administered By  Nusrat Lee RN          potassium chloride ER (KLOR-CON M) CR tablet 20 mEq     Admin Date  11/17/2020 Action  Given Dose  20 mEq Route  Oral Administered By  Nusrat Lee RN                BP (!) 88/0   Pulse 72   Temp 98  F (36.7  C) (Oral)   Resp 16   SpO2 96%

## 2020-11-17 NOTE — LETTER
11/17/2020         RE: Jim Willingham  [address] [facility] [address]        Dear Colleague,    Thank you for referring your patient, Jim Willingham, to the [facility]. Please see a copy of my visit note below.    Nursing Note  Jim Willingham presents [facility] for:   Chief Complaint   Patient presents with     Infusion     IV Bumex, oral K+     During [facility]'s [facility] appointment, orders from Didi Butler PA-C were completed.  Frequency: once     Progress note:  Patient identification verified by name and date of birth.  Assessment completed.  Vitals recorded in Doc Flowsheets.  Patient was provided with education regarding medication/procedure and possible side effects.  Patient verbalized understanding.     present during visit today: Not Applicable.    Premedications: were not ordered.    Drug Waste Record: No    Infusion length and rate:  infusion given over approximately 4 minutes    Labs: were drawn per orders.     Vascular access: Butterfly needle    Post Infusion Assessment:  Patient tolerated infusion without incident.  Site patent and intact, free from redness, edema or discomfort.     Discharge Plan:   Follow up plan of care with: primary care provider,  Discharge instructions were reviewed with patient.  Patient/representative verbalized understanding of discharge instructions and all questions answered.  Patient discharged from [facility] in stable condition.    Nusrat Lee RN    Administrations This Visit     bumetanide (BUMEX) injection 5 mg     Admin Date  11/17/2020 Action  Given Dose  5 mg Route  Intravenous Administered By  Nusrat Lee RN          potassium chloride ER (KLOR-CON M) CR tablet 20 mEq     Admin Date  11/17/2020 Action  Given Dose  20 mEq Route  Oral Administered By  Nusrat Lee RN                 BP (!) 88/0   Pulse 72   Temp 98  F (36.7  C) (Oral)   Resp 16   SpO2 96%         Again, thank you for allowing me to participate in the care of your patient.        Sincerely,        [facility]

## 2020-11-17 NOTE — PROGRESS NOTES
ANTICOAGULATION FOLLOW-UP CLINIC VISIT    Patient Name:  Jim Willingham  Date:  2020  Contact Type:  Telephone    SUBJECTIVE:  Patient Findings     Positives:  Change in diet/appetite (Pt plans to eat a serving of asparagus tonight with supper.)             OBJECTIVE    Recent labs: (last 7 days)     20  1345   INR 2.91*       ASSESSMENT / PLAN  INR assessment THER    Recheck INR In: 1 WEEK    INR Location Clinic      Anticoagulation Summary  As of 2020    INR goal:  2.0-3.0   TTR:  57.4 % (10.9 mo)   INR used for dosin.91 (2020)   Warfarin maintenance plan:  7.5 mg (5 mg x 1.5) every Sun, Thu; 5 mg (5 mg x 1) all other days   Full warfarin instructions:  7.5 mg every Sun, Thu; 5 mg all other days   Weekly warfarin total:  40 mg   No change documented:  Delisa Hillman RN   Plan last modified:  Delisa Hillman RN (9/15/2020)   Next INR check:  2020   Priority:  Critical   Target end date:  Indefinite    Indications    LVAD (left ventricular assist device) present (H) [Z95.811]  Long-term (current) use of anticoagulants [Z79.01] [Z79.01]  Chronic systolic congestive heart failure (H) [I50.22]             Anticoagulation Episode Summary     INR check location:      Preferred lab:      Send INR reminders to:  [facility]    Comments:  +++Patient drawn at Home care visit Q Mon. (8/10/20)+++  Labs drawn either at [facility] or [facility]  LVAD placed 17   II  ASA 81 mg daily      Anticoagulation Care Providers     Provider Role Specialty Phone number    Delisa Montgomery MD Referring Advanced Heart Failure and Transplant Cardiology [phone]            See the Encounter Report to view Anticoagulation Flowsheet and Dosing Calendar (Go to Encounters tab in chart review, and find the Anticoagulation Therapy Visit)  Spoke with patient.  Patient had LVAD placed on:   17  Type of LVAD: HM2  Patient's current Aspirin dose: 81mg  LVAD Protocol  followed:  Yes  Delisa Hillman RN

## 2020-11-18 ENCOUNTER — CARE COORDINATION (OUTPATIENT)
Dept: CARDIOLOGY | Facility: CLINIC | Age: 65
End: 2020-11-18

## 2020-11-18 DIAGNOSIS — Z95.811 LVAD (LEFT VENTRICULAR ASSIST DEVICE) PRESENT (H): Primary | ICD-10-CM

## 2020-11-18 DIAGNOSIS — I50.22 CHRONIC SYSTOLIC CONGESTIVE HEART FAILURE (H): ICD-10-CM

## 2020-11-18 NOTE — PROGRESS NOTES
"Pt had IV push bumex 5mg in the infusion clinic yesterday. Instructions to decrease bumex for yesterday afternoon's dose (to 4mg) was not communicated to pt, so he took 6mg po BID yesterday in addition to the 5mg IV.   Called pt to check in this am and relay to take only 4mg of bumex this am. Pt had already taken 6mg of bumex for his am dose. Pt reports feeling quite worn out. Body is achey, calves are tender, he feels like a \"wet dishrag\". Pt's scale is unreliable after his granddaughter dropped it last week, but is showing a 10lb weight loss from Monday to today. He ordered a new scale that will be delivered today. Instructed pt to call VAD coordinator with weight once his scale arrives. Also reminded pt to get lab appt for tomorrow to check BMP. After pt calls back with weight, will discuss further steps with ROGER Rivera.      "

## 2020-11-18 NOTE — PROGRESS NOTES
Reviewed pts symptoms and medications with Melisa Butler. Received the following instructions:    Hold afternoon dose of bumex today. Hold afternoon k today  Tomorrow (11/18) take Bumex 4mg in the am and 2mg in the evening. Take kcl 20 meq daily.   Labs Friday   Starting Friday, take Bumex 4mg BID and kcl 20 meq daily.     Called pt to review instructions. Pt did not answer. Left voicemail instructing pt to review instructions on [facility]. My chart message sent with instructions. Pt already has appt for labs set up for tomorrow. Will review results with Melisa tomorrow.

## 2020-11-18 NOTE — PROGRESS NOTES
Called pt and requested random drug screening per pre-transplant guidelines. Pt verbalized understanding, and will go to lab tomorrow.

## 2020-11-19 ENCOUNTER — CARE COORDINATION (OUTPATIENT)
Dept: CARDIOLOGY | Facility: CLINIC | Age: 65
End: 2020-11-19

## 2020-11-19 DIAGNOSIS — Z95.811 LVAD (LEFT VENTRICULAR ASSIST DEVICE) PRESENT (H): ICD-10-CM

## 2020-11-19 DIAGNOSIS — I50.22 CHRONIC SYSTOLIC CONGESTIVE HEART FAILURE (H): ICD-10-CM

## 2020-11-19 LAB
AMPHETAMINES UR QL SCN: NEGATIVE
ANION GAP SERPL CALCULATED.3IONS-SCNC: 3 MMOL/L (ref 3–14)
BARBITURATES UR QL: NEGATIVE
BENZODIAZ UR QL: NEGATIVE
BUN SERPL-MCNC: 26 MG/DL (ref 7–30)
CALCIUM SERPL-MCNC: 8.5 MG/DL (ref 8.5–10.1)
CANNABINOIDS UR QL SCN: NEGATIVE
CHLORIDE SERPL-SCNC: 102 MMOL/L (ref 94–109)
CO2 SERPL-SCNC: 32 MMOL/L (ref 20–32)
COCAINE UR QL: NEGATIVE
CREAT SERPL-MCNC: 1.59 MG/DL (ref 0.66–1.25)
ETHANOL UR QL SCN: NEGATIVE
GFR SERPL CREATININE-BSD FRML MDRD: 45 ML/MIN/{1.73_M2}
GLUCOSE SERPL-MCNC: 150 MG/DL (ref 70–99)
OPIATES UR QL SCN: NEGATIVE
PCP UR QL SCN: NEGATIVE
POTASSIUM SERPL-SCNC: 3.9 MMOL/L (ref 3.4–5.3)
SODIUM SERPL-SCNC: 137 MMOL/L (ref 133–144)

## 2020-11-19 PROCEDURE — 80321 ALCOHOLS BIOMARKERS 1OR 2: CPT | Mod: 90 | Performed by: INTERNAL MEDICINE

## 2020-11-19 PROCEDURE — 80307 DRUG TEST PRSMV CHEM ANLYZR: CPT | Performed by: INTERNAL MEDICINE

## 2020-11-19 PROCEDURE — 80048 BASIC METABOLIC PNL TOTAL CA: CPT | Performed by: PHYSICIAN ASSISTANT

## 2020-11-19 PROCEDURE — 99000 SPECIMEN HANDLING OFFICE-LAB: CPT | Performed by: INTERNAL MEDICINE

## 2020-11-19 PROCEDURE — 36415 COLL VENOUS BLD VENIPUNCTURE: CPT | Performed by: PHYSICIAN ASSISTANT

## 2020-11-19 PROCEDURE — 80320 DRUG SCREEN QUANTALCOHOLS: CPT | Performed by: INTERNAL MEDICINE

## 2020-11-19 NOTE — PROGRESS NOTES
"D:  Pt's creatinine down to 1.57.  Potassium 3.9.   I: Called pt to check in.  A:  Pt states he is feeling a lot better today.  Yesterday felt \"worn out\".  Weight still at 232.  P:  Pt to resume bumex 4mg bid tomorrow.  "

## 2020-11-20 ENCOUNTER — CARE COORDINATION (OUTPATIENT)
Dept: CARDIOLOGY | Facility: CLINIC | Age: 65
End: 2020-11-20

## 2020-11-20 DIAGNOSIS — I50.22 CHRONIC SYSTOLIC CONGESTIVE HEART FAILURE (H): Primary | ICD-10-CM

## 2020-11-20 RX ORDER — POTASSIUM CHLORIDE 750 MG/1
TABLET, EXTENDED RELEASE ORAL
Qty: 270 TABLET | Refills: 3 | Status: SHIPPED | OUTPATIENT
Start: 2020-11-20 | End: 2020-11-24

## 2020-11-20 RX ORDER — BUMETANIDE 2 MG/1
6 TABLET ORAL 2 TIMES DAILY
Qty: 540 TABLET | Refills: 3 | Status: SHIPPED | OUTPATIENT
Start: 2020-11-20 | End: 2020-11-24

## 2020-11-20 NOTE — PROGRESS NOTES
D: Pt reported weight 232.   I:  Melisa, PA would like pt to take bumex 6mg bid and potassium 20/10meq.  Called pt.  Instructed pt to contact VAD coordinator with weight on Monday and get labs on Wednesday.  A:  Pt verblalized understanding.  P:  Labs on Wednesday.

## 2020-11-23 ENCOUNTER — CARE COORDINATION (OUTPATIENT)
Dept: CARDIOLOGY | Facility: CLINIC | Age: 65
End: 2020-11-23

## 2020-11-23 ENCOUNTER — TELEPHONE (OUTPATIENT)
Dept: PEDIATRICS | Facility: CLINIC | Age: 65
End: 2020-11-23

## 2020-11-23 NOTE — TELEPHONE ENCOUNTER
Patient has prednisone 5 mg tablets at home.  Currently taking 5 mg daily.  Have patient improve increase the dose to 20 mg daily for the next 4 to 5 days until he is seen on the 27th.  Then go back down to 5 mg a day.  See if it helps.

## 2020-11-23 NOTE — PROGRESS NOTES
Called pt to check in today. He reports weight is down to 230lb and he feels much better than last week. Edema in lower extremities has improved, he still has very slight pitting edema, but legs overall are much less swollen. Pt wonders if his dry weight is a little higher than previously estimated (220lb), as he only feels slightly volume up at this time.   Pt is complaining of severe, sharp pain to his hands. He has a call out to his PCP as he believes it is related to carpal tunnel pain.   Pt will get labs drawn tomorrow afternoon as that was the only appt available for lab draws this week.   Reviewed pt's weight and symptoms with ROGER Rivera. Recommend that pt stays at Bumex 6mg BID and KCL 20mEq in the am and 10mEq in the pm until after labs are reviewed. Called pt back to review plan, pt verbalized understanding.

## 2020-11-23 NOTE — TELEPHONE ENCOUNTER
Called to discuss provider note on increasing steroid from 5mg to 20mg for 4-5days. Patient demonstrated verbal read back of this.     Jim does have a concern about running out of his medication too soon. States that his medication has not had to be refilled yet by Dr Gómez since he just recently switched to [facility].     Patient did agree to just let waljeremiejeri know that when he is due for refills to have the request sent to Dr. Gómez since provider it taking over as PCP.     Patient has agreed to this plan and has no further questions at this time.    Olga Hickey RN  [facility]

## 2020-11-23 NOTE — TELEPHONE ENCOUNTER
[facility] Call Center    Phone Message    May a detailed message be left on voicemail: yes     Reason for Call: Patient states he is having carpel tunnel surgery on Friday but the pain in his hand is unbearable right now and is requesting a call back from the nurse to discuss.  He states it feels like a bunch of knives in his hand.  Please call patient back to discuss.  Thank you.    Action Taken: Message routed to:  Primary Care p [phone]    Travel Screening: Not Applicable

## 2020-11-23 NOTE — TELEPHONE ENCOUNTER
"RN called patient to triage pain. Patient states that he is having bilateral wrist pain that fluctuates between a 8.5-10. \"Just trying to open my pill bottles this morning spiked my pain to a 10. I feel like theres knives in there stabbing.\"    Patient has an upcoming apt for steroid injections on:  11/27/2020 with Dr Ag.     [place] said that the tramadol is not working for pain and he did take more than his prescribed amount of gabapentin this weekend trying to get some relief with no success.     Patient has also attempted cold compress with no relief.     Routing to provider to please advise as patient is requesting advise on what else to do or if medication may be prescribed until his upcoming apt.     [facility] in [place] is patients preferred pharmacy    Olga Hickey RN  [facility]    "

## 2020-11-24 ENCOUNTER — ANTICOAGULATION THERAPY VISIT (OUTPATIENT)
Dept: ANTICOAGULATION | Facility: CLINIC | Age: 65
End: 2020-11-24

## 2020-11-24 ENCOUNTER — CARE COORDINATION (OUTPATIENT)
Dept: CARDIOLOGY | Facility: CLINIC | Age: 65
End: 2020-11-24

## 2020-11-24 DIAGNOSIS — I50.22 CHRONIC SYSTOLIC CONGESTIVE HEART FAILURE (H): ICD-10-CM

## 2020-11-24 DIAGNOSIS — Z95.811 LVAD (LEFT VENTRICULAR ASSIST DEVICE) PRESENT (H): ICD-10-CM

## 2020-11-24 DIAGNOSIS — Z79.01 LONG TERM CURRENT USE OF ANTICOAGULANT THERAPY: ICD-10-CM

## 2020-11-24 LAB
ANION GAP SERPL CALCULATED.3IONS-SCNC: 9 MMOL/L (ref 3–14)
BUN SERPL-MCNC: 47 MG/DL (ref 7–30)
CALCIUM SERPL-MCNC: 8.7 MG/DL (ref 8.5–10.1)
CHLORIDE SERPL-SCNC: 102 MMOL/L (ref 94–109)
CO2 SERPL-SCNC: 26 MMOL/L (ref 20–32)
CREAT SERPL-MCNC: 2.16 MG/DL (ref 0.66–1.25)
GFR SERPL CREATININE-BSD FRML MDRD: 31 ML/MIN/{1.73_M2}
GLUCOSE SERPL-MCNC: 214 MG/DL (ref 70–99)
INR PPP: 2.64 (ref 0.86–1.14)
POTASSIUM SERPL-SCNC: 4.7 MMOL/L (ref 3.4–5.3)
SODIUM SERPL-SCNC: 137 MMOL/L (ref 133–144)

## 2020-11-24 PROCEDURE — 85610 PROTHROMBIN TIME: CPT | Performed by: PHYSICIAN ASSISTANT

## 2020-11-24 PROCEDURE — 36415 COLL VENOUS BLD VENIPUNCTURE: CPT | Performed by: PHYSICIAN ASSISTANT

## 2020-11-24 PROCEDURE — 80048 BASIC METABOLIC PNL TOTAL CA: CPT | Performed by: PHYSICIAN ASSISTANT

## 2020-11-24 RX ORDER — BUMETANIDE 2 MG/1
4 TABLET ORAL 2 TIMES DAILY
Qty: 540 TABLET | Refills: 3 | COMMUNITY
Start: 2020-11-24 | End: 2020-11-27

## 2020-11-24 RX ORDER — POTASSIUM CHLORIDE 750 MG/1
20 TABLET, EXTENDED RELEASE ORAL DAILY
Qty: 270 TABLET | Refills: 3 | COMMUNITY
Start: 2020-11-24 | End: 2020-11-27

## 2020-11-24 NOTE — PROGRESS NOTES
D: Pt with increase in diuretics last week. Creatinine is now elevated, at 2.1, from 1.5 last week. Pt states he is feeling good. His weight is stable. His edema is down from last week.   I: Spoke with ROGER Rivera, who would like to to hold tonight's doses of bumex and KCl. Tomorrow, she would like him to take 4mg of  bumex in the morning and 2mg in the evening, along with 10mEq of KCl. On Thursday, she would like to take 4mg of bumex, BID, along with 20mEq of KCl, daily. Pt was given these instructions. He was also instructed to get labs on Friday.   A: Pt verbalized understanding.   P: Will continue to monitor. Plan to recheck BMP on Friday.

## 2020-11-24 NOTE — PROGRESS NOTES
ANTICOAGULATION FOLLOW-UP CLINIC VISIT    Patient Name:  Jim Willingham  Date:  2020  Contact Type:  Telephone    SUBJECTIVE:  Patient Findings     Comments:  Spoke to Jim.        Clinical Outcomes     Comments:  Spoke to Jim.           OBJECTIVE    Recent labs: (last 7 days)     20  1600   INR 2.64*       ASSESSMENT / PLAN  INR assessment THER    Recheck INR In: 6 DAYS    INR Location Clinic      Anticoagulation Summary  As of 2020    INR goal:  2.0-3.0   TTR:  58.8 % (10.9 mo)   INR used for dosin.64 (2020)   Warfarin maintenance plan:  7.5 mg (5 mg x 1.5) every Sun, Thu; 5 mg (5 mg x 1) all other days   Full warfarin instructions:  7.5 mg every Sun, Thu; 5 mg all other days   Weekly warfarin total:  40 mg   No change documented:  Brendan Montoya RN   Plan last modified:  Delisa Hillman RN (9/15/2020)   Next INR check:  2020   Priority:  Critical   Target end date:  Indefinite    Indications    LVAD (left ventricular assist device) present (H) [Z95.811]  Long-term (current) use of anticoagulants [Z79.01] [Z79.01]  Chronic systolic congestive heart failure (H) [I50.22]             Anticoagulation Episode Summary     INR check location:      Preferred lab:      Send INR reminders to:   [facility]    Comments:  +++Patient drawn at Home care visit Q Mon. (8/10/20)+++  Labs drawn either at [facility] or [facility]  LVAD placed 17   II  ASA 81 mg daily      Anticoagulation Care Providers     Provider Role Specialty Phone number    Delisa Montgomery MD Referring Advanced Heart Failure and Transplant Cardiology [phone]            See the Encounter Report to view Anticoagulation Flowsheet and Dosing Calendar (Go to Encounters tab in chart review, and find the Anticoagulation Therapy Visit)    INR/CFX/F2 RESULT: INR result is 2.64    ASSESSMENT:Spoke with patient. Gave them their lab results and new warfarin recommendation.  No changes in  health, medication, or diet. No missed doses, no falls. No signs or symptoms of bleed or clotting.    DOSING ADJUSTMENT: continue pattern of dosing    NEXT INR/FACTOR X OR FACTOR II:11/30 with other labs    PROTOCOL FOLLOWED:LVAD goal 2-3  Patient had LVAD placed on:   6/19/17  Type of LVAD: HM 2  Patient's current Aspirin dose: 81mg  LVAD Protocol followed:  Yes.   If Not Followed Explanation:  Brendan Garcia, RN

## 2020-11-25 LAB — PETH BLD-MCNC: NEGATIVE NG/ML

## 2020-11-27 ENCOUNTER — CARE COORDINATION (OUTPATIENT)
Dept: CARDIOLOGY | Facility: CLINIC | Age: 65
End: 2020-11-27

## 2020-11-27 ENCOUNTER — DOCUMENTATION ONLY (OUTPATIENT)
Dept: ANTICOAGULATION | Facility: CLINIC | Age: 65
End: 2020-11-27

## 2020-11-27 ENCOUNTER — OFFICE VISIT (OUTPATIENT)
Dept: ORTHOPEDICS | Facility: CLINIC | Age: 65
End: 2020-11-27
Payer: COMMERCIAL

## 2020-11-27 VITALS — HEIGHT: 69 IN | WEIGHT: 245.5 LBS | BODY MASS INDEX: 36.36 KG/M2

## 2020-11-27 DIAGNOSIS — I50.22 CHRONIC SYSTOLIC CONGESTIVE HEART FAILURE (H): Primary | ICD-10-CM

## 2020-11-27 DIAGNOSIS — I50.22 CHRONIC SYSTOLIC CONGESTIVE HEART FAILURE (H): ICD-10-CM

## 2020-11-27 DIAGNOSIS — Z95.811 LVAD (LEFT VENTRICULAR ASSIST DEVICE) PRESENT (H): ICD-10-CM

## 2020-11-27 DIAGNOSIS — M19.012 PRIMARY OSTEOARTHRITIS OF LEFT SHOULDER: ICD-10-CM

## 2020-11-27 DIAGNOSIS — G56.03 CARPAL TUNNEL SYNDROME, BILATERAL: Primary | ICD-10-CM

## 2020-11-27 DIAGNOSIS — Z79.01 LONG TERM CURRENT USE OF ANTICOAGULANT THERAPY: ICD-10-CM

## 2020-11-27 LAB
ANION GAP SERPL CALCULATED.3IONS-SCNC: 10 MMOL/L (ref 3–14)
BUN SERPL-MCNC: 43 MG/DL (ref 7–30)
CALCIUM SERPL-MCNC: 8.2 MG/DL (ref 8.5–10.1)
CHLORIDE SERPL-SCNC: 101 MMOL/L (ref 94–109)
CO2 SERPL-SCNC: 30 MMOL/L (ref 20–32)
CREAT SERPL-MCNC: 1.66 MG/DL (ref 0.66–1.25)
GFR SERPL CREATININE-BSD FRML MDRD: 43 ML/MIN/{1.73_M2}
GLUCOSE SERPL-MCNC: 138 MG/DL (ref 70–99)
INR PPP: 2.42 (ref 0.86–1.14)
POTASSIUM SERPL-SCNC: 3 MMOL/L (ref 3.4–5.3)
SODIUM SERPL-SCNC: 141 MMOL/L (ref 133–144)

## 2020-11-27 PROCEDURE — 76942 ECHO GUIDE FOR BIOPSY: CPT | Performed by: FAMILY MEDICINE

## 2020-11-27 PROCEDURE — 85610 PROTHROMBIN TIME: CPT | Performed by: PHYSICIAN ASSISTANT

## 2020-11-27 PROCEDURE — 80048 BASIC METABOLIC PNL TOTAL CA: CPT | Performed by: PHYSICIAN ASSISTANT

## 2020-11-27 PROCEDURE — 20526 THER INJECTION CARP TUNNEL: CPT | Mod: 50 | Performed by: FAMILY MEDICINE

## 2020-11-27 PROCEDURE — 36415 COLL VENOUS BLD VENIPUNCTURE: CPT | Performed by: PHYSICIAN ASSISTANT

## 2020-11-27 RX ORDER — METHYLPREDNISOLONE ACETATE 40 MG/ML
20 INJECTION, SUSPENSION INTRA-ARTICULAR; INTRALESIONAL; INTRAMUSCULAR; SOFT TISSUE
Status: DISCONTINUED | OUTPATIENT
Start: 2020-11-27 | End: 2021-01-11

## 2020-11-27 RX ORDER — BUMETANIDE 2 MG/1
TABLET ORAL
Qty: 540 TABLET | Refills: 3 | COMMUNITY
Start: 2020-11-27 | End: 2020-12-11

## 2020-11-27 RX ORDER — POTASSIUM CHLORIDE 750 MG/1
20 TABLET, EXTENDED RELEASE ORAL 2 TIMES DAILY
Qty: 270 TABLET | Refills: 3 | COMMUNITY
Start: 2020-11-27 | End: 2020-12-11

## 2020-11-27 RX ADMIN — METHYLPREDNISOLONE ACETATE 20 MG: 40 INJECTION, SUSPENSION INTRA-ARTICULAR; INTRALESIONAL; INTRAMUSCULAR; SOFT TISSUE at 13:40

## 2020-11-27 ASSESSMENT — MIFFLIN-ST. JEOR: SCORE: 1897.34

## 2020-11-27 NOTE — PROGRESS NOTES
"      [facility]  11/27/2020    Jim Willingham's chief complaint for this visit includes:  Chief Complaint   Patient presents with     New Patient     Carpal Tunnel Syndrome     PCP: Ricardo Gómez    Referring Provider:  Elbert Pettit MD  [address] [place] [id number]     Vital signs:                    Height: 175 cm (5' 8.9\") Weight: 111.4 kg (245 lb 8 oz)  Estimated body mass index is 36.36 kg/m  as calculated from the following:    Height as of this encounter: 1.75 m (5' 8.9\").    Weight as of this encounter: 111.4 kg (245 lb 8 oz).          Reason for visit:     What part of your body is injured / painful?  bilateral wrist    What caused the injury /pain? Unsure    How long ago did your injury occur or pain begin? several years ago    What are your most bothersome symptoms? Pain, Swelling, Numbness, Tingling, Weakness and Inability to perform ADLs    How would you characterize your symptom?  stabbing, sharp and shooting    What makes your symptoms better? Nothing    What makes your symptoms worse? Movement    Have you been previously seen for this problem? Yes    Medical History:    Any recent changes to your medical history? No    Any new medication prescribed since last visit? No    Have you had surgery on this body part before? No    Social History:    Occupation: Retired    Handedness: Left      Review of Systems:    Do you have fever, chills, weight loss? No    Do you have any vision problems? No    Do you have any chest pain or edema? No    Do you have any shortness of breath or wheezing?  No    Do you have stomach problems? No    Do you have any urinary track issues? No    Do you have any numbness or focal weakness? No    Do you have diabetes? Yes, Type 2    Do you have problems with bleeding or clotting? No    Do you have an rashes or other skin lesions? No         "

## 2020-11-27 NOTE — PROGRESS NOTES
"PROCEDURE ENCOUNTER    [facility]  Orthopedics  Luis DanielKyle Ag, DO  2020     Name: Jim Willingham  MRN: [id number]  Age: 65 year old  : 1955    Referring provider:   Diagnosis:    Carpal Tunnel Injection - Ultrasound Guided  The patient was informed of the risks and the benefits of the procedure and a written consent was signed. Discussed that given severity of his carpal tunnel syndrome on EMG and symptoms of clumsiness persistent numbness and loss dexterity, this will only be temporary.  He agrees to pursue carpal tunnel release surgery consultation.  The patient s left wrist was prepped with chlorhexidine in sterile fashion.   20 mg of methylprednisolone suspension was drawn up into a 3 mL syringe with 1.0 mL of 1% lidocaine.  Injection was performed using sterile technique.  Under ultrasound guidance a 1.5\" 22-gauge needle was used to enter the left wrist at the distal palmar crease medial to the median nerve.  Needle placement was visualized and documented with ultrasound.  Ultrasound visualization required to ensure injection material enters the perineural sheath and not a vessel or nerve itself.  Injection performed long axis to the probe.  Injection solution visualized surrounding the perineurium.  Images were permanently stored for the patient's record.  There were no complications. The patient tolerated the procedure well. There was negligible bleeding.   Precise procedure above was repeated for right wrist using 0 mg of methylprednisolone suspension was drawn up into a 3 mL syringe with 1.0 mL of 1% lidocaine. There were no complications. The patient tolerated the procedure well. There was negligible bleeding.   The patient was instructed to ice the wrist upon leaving clinic and refrain from overuse over the next 3 days.   The patient was instructed to call or go to the emergency room with any unusual pain, swelling, redness, or if otherwise concerned.  He does have ongoing left shoulder " pain which has been severe at times. We discussed consideration for CSI under US-Guidance.  He will return in 2 weeks for this and to review improvements in carpal tunnel.  A limited script for tylenol #3 was provided in the meantime.

## 2020-11-27 NOTE — PROGRESS NOTES
Hand / Upper Extremity Injection/Arthrocentesis: bilateral carpal tunnel    Date/Time: 11/27/2020 1:40 PM  Performed by: Nba Ag DO  Authorized by: Nba Ag DO     Indications:  Pain  Needle Size:  25 G  Guidance: ultrasound    Condition: carpal tunnel    Laterality:  Bilateral    Site:  Bilateral carpal tunnel  Medications (Right):  20 mg methylPREDNISolone 40 MG/ML  Medications (Left):  20 mg methylPREDNISolone 40 MG/ML  Consent Given by:  Patient  Timeout: timeout called immediately prior to procedure    Prep: patient was prepped and draped in usual sterile fashion      NDC#[id number]

## 2020-11-27 NOTE — LETTER
"    11/27/2020         RE: Jim Willingham  [address] [facility] [address]        Dear Colleague,    Thank you for referring your patient, Jim Willingham, to the [facility] [place]. Please see a copy of my visit note below.          [facility]  11/27/2020    Jim Willingham's chief complaint for this visit includes:  Chief Complaint   Patient presents with     New Patient     Carpal Tunnel Syndrome     PCP: Ricardo Gómez    Referring Provider:  Elbert Pettit MD  [address] [place] [id number]     Vital signs:                    Height: 175 cm (5' 8.9\") Weight: 111.4 kg (245 lb 8 oz)  Estimated body mass index is 36.36 kg/m  as calculated from the following:    Height as of this encounter: 1.75 m (5' 8.9\").    Weight as of this encounter: 111.4 kg (245 lb 8 oz).          Reason for visit:     What part of your body is injured / painful?  bilateral wrist    What caused the injury /pain? Unsure    How long ago did your injury occur or pain begin? several years ago    What are your most bothersome symptoms? Pain, Swelling, Numbness, Tingling, Weakness and Inability to perform ADLs    How would you characterize your symptom?  stabbing, sharp and shooting    What makes your symptoms better? Nothing    What makes your symptoms worse? Movement    Have you been previously seen for this problem? Yes    Medical History:    Any recent changes to your medical history? No    Any new medication prescribed since last visit? No    Have you had surgery on this body part before? No    Social History:    Occupation: Retired    Handedness: Left      Review of Systems:    Do you have fever, chills, weight loss? No    Do you have any vision problems? No    Do you have any chest pain or edema? No    Do you have any shortness of breath or wheezing?  No    Do you have stomach problems? No    Do you have any urinary track issues? No    Do you have any numbness or " "focal weakness? No    Do you have diabetes? Yes, Type 2    Do you have problems with bleeding or clotting? No    Do you have an rashes or other skin lesions? No           PROCEDURE ENCOUNTER    [facility]  Orthopedics  Nba Ag DO  2020     Name: Jim Willingham  MRN: [id number]  Age: 65 year old  : 1955    Referring provider:   Diagnosis:    Carpal Tunnel Injection - Ultrasound Guided  The patient was informed of the risks and the benefits of the procedure and a written consent was signed. Discussed that given severity of his carpal tunnel syndrome on EMG and symptoms of clumsiness persistent numbness and loss dexterity, this will only be temporary.  He agrees to pursue carpal tunnel release surgery consultation.  The patient s left wrist was prepped with chlorhexidine in sterile fashion.   20 mg of methylprednisolone suspension was drawn up into a 3 mL syringe with 1.0 mL of 1% lidocaine.  Injection was performed using sterile technique.  Under ultrasound guidance a 1.5\" 22-gauge needle was used to enter the left wrist at the distal palmar crease medial to the median nerve.  Needle placement was visualized and documented with ultrasound.  Ultrasound visualization required to ensure injection material enters the perineural sheath and not a vessel or nerve itself.  Injection performed long axis to the probe.  Injection solution visualized surrounding the perineurium.  Images were permanently stored for the patient's record.  There were no complications. The patient tolerated the procedure well. There was negligible bleeding.   Precise procedure above was repeated for right wrist using 0 mg of methylprednisolone suspension was drawn up into a 3 mL syringe with 1.0 mL of 1% lidocaine. There were no complications. The patient tolerated the procedure well. There was negligible bleeding.   The patient was instructed to ice the wrist upon leaving clinic and refrain from overuse over the next 3 " days.   The patient was instructed to call or go to the emergency room with any unusual pain, swelling, redness, or if otherwise concerned.  He does have ongoing left shoulder pain which has been severe at times. We discussed consideration for CSI under US-Guidance.  He will return in 2 weeks for this and to review improvements in carpal tunnel.  A limited script for tylenol #3 was provided in the meantime.         Hand / Upper Extremity Injection/Arthrocentesis: bilateral carpal tunnel    Date/Time: 11/27/2020 1:40 PM  Performed by: Nba Ag DO  Authorized by: Nba Ag DO     Indications:  Pain  Needle Size:  25 G  Guidance: ultrasound    Condition: carpal tunnel    Laterality:  Bilateral    Site:  Bilateral carpal tunnel  Medications (Right):  20 mg methylPREDNISolone 40 MG/ML  Medications (Left):  20 mg methylPREDNISolone 40 MG/ML  Consent Given by:  Patient  Timeout: timeout called immediately prior to procedure    Prep: patient was prepped and draped in usual sterile fashion      NDC#4556-0848-30      Again, thank you for allowing me to participate in the care of your patient.        Sincerely,        Nba Ag DO

## 2020-11-27 NOTE — PROGRESS NOTES
Called patient to review labs from today with patient. Per recommendations from Fangcang AFSANEH, patient should make the following medication changes for the weekend:  -Tonight, take an extra 60 mEq of potassium.  -Tomorrow and going forward, change bumex to 5mg BID, with potassium 20 meq BID.  -Get repeat BMP Monday, and call to review weights.  Patient was encouraged to track and document his weights daily. Patient reported getting a brand new bluetooth scale recently, and was agreeable with the plan.

## 2020-11-30 ENCOUNTER — OFFICE VISIT (OUTPATIENT)
Dept: PEDIATRICS | Facility: CLINIC | Age: 65
End: 2020-11-30
Payer: COMMERCIAL

## 2020-11-30 ENCOUNTER — ANTICOAGULATION THERAPY VISIT (OUTPATIENT)
Dept: ANTICOAGULATION | Facility: CLINIC | Age: 65
End: 2020-11-30

## 2020-11-30 ENCOUNTER — CARE COORDINATION (OUTPATIENT)
Dept: CARDIOLOGY | Facility: CLINIC | Age: 65
End: 2020-11-30

## 2020-11-30 VITALS — OXYGEN SATURATION: 96 % | HEART RATE: 66 BPM | WEIGHT: 245.4 LBS | BODY MASS INDEX: 37.19 KG/M2 | HEIGHT: 68 IN

## 2020-11-30 DIAGNOSIS — I50.22 CHRONIC SYSTOLIC CONGESTIVE HEART FAILURE (H): ICD-10-CM

## 2020-11-30 DIAGNOSIS — N18.32 STAGE 3B CHRONIC KIDNEY DISEASE (H): ICD-10-CM

## 2020-11-30 DIAGNOSIS — E11.8 TYPE 2 DIABETES MELLITUS WITH COMPLICATION, WITH LONG-TERM CURRENT USE OF INSULIN (H): ICD-10-CM

## 2020-11-30 DIAGNOSIS — G89.29 CHRONIC RIGHT SHOULDER PAIN: ICD-10-CM

## 2020-11-30 DIAGNOSIS — I42.8 NICM (NONISCHEMIC CARDIOMYOPATHY) (H): ICD-10-CM

## 2020-11-30 DIAGNOSIS — M25.511 CHRONIC RIGHT SHOULDER PAIN: ICD-10-CM

## 2020-11-30 DIAGNOSIS — Z95.811 LVAD (LEFT VENTRICULAR ASSIST DEVICE) PRESENT (H): ICD-10-CM

## 2020-11-30 DIAGNOSIS — Z79.01 LONG TERM CURRENT USE OF ANTICOAGULANT THERAPY: ICD-10-CM

## 2020-11-30 DIAGNOSIS — Z79.4 TYPE 2 DIABETES MELLITUS WITH COMPLICATION, WITH LONG-TERM CURRENT USE OF INSULIN (H): ICD-10-CM

## 2020-11-30 DIAGNOSIS — F33.9 MAJOR DEPRESSION, RECURRENT, CHRONIC (H): Primary | ICD-10-CM

## 2020-11-30 DIAGNOSIS — J44.9 CHRONIC OBSTRUCTIVE PULMONARY DISEASE, UNSPECIFIED COPD TYPE (H): ICD-10-CM

## 2020-11-30 DIAGNOSIS — M1A.3790 CHRONIC GOUT DUE TO RENAL IMPAIRMENT INVOLVING FOOT WITHOUT TOPHUS, UNSPECIFIED LATERALITY: ICD-10-CM

## 2020-11-30 PROBLEM — Z23 NEED FOR TDAP VACCINATION: Status: RESOLVED | Noted: 2020-11-02 | Resolved: 2020-11-30

## 2020-11-30 LAB
ALBUMIN SERPL-MCNC: 3.8 G/DL (ref 3.4–5)
ALP SERPL-CCNC: 145 U/L (ref 40–150)
ALT SERPL W P-5'-P-CCNC: 48 U/L (ref 0–70)
ANION GAP SERPL CALCULATED.3IONS-SCNC: 7 MMOL/L (ref 3–14)
AST SERPL W P-5'-P-CCNC: 39 U/L (ref 0–45)
BASOPHILS # BLD AUTO: 0.1 10E9/L (ref 0–0.2)
BASOPHILS NFR BLD AUTO: 1 %
BILIRUB SERPL-MCNC: 0.7 MG/DL (ref 0.2–1.3)
BUN SERPL-MCNC: 40 MG/DL (ref 7–30)
CALCIUM SERPL-MCNC: 8.3 MG/DL (ref 8.5–10.1)
CHLORIDE SERPL-SCNC: 101 MMOL/L (ref 94–109)
CO2 SERPL-SCNC: 29 MMOL/L (ref 20–32)
CREAT SERPL-MCNC: 1.78 MG/DL (ref 0.66–1.25)
DIFFERENTIAL METHOD BLD: ABNORMAL
EOSINOPHIL # BLD AUTO: 0.1 10E9/L (ref 0–0.7)
EOSINOPHIL NFR BLD AUTO: 1.2 %
ERYTHROCYTE [DISTWIDTH] IN BLOOD BY AUTOMATED COUNT: 17.9 % (ref 10–15)
GFR SERPL CREATININE-BSD FRML MDRD: 40 ML/MIN/{1.73_M2}
GLUCOSE SERPL-MCNC: 175 MG/DL (ref 70–99)
HCT VFR BLD AUTO: 34.3 % (ref 40–53)
HGB BLD-MCNC: 10.2 G/DL (ref 13.3–17.7)
IMM GRANULOCYTES # BLD: 0 10E9/L (ref 0–0.4)
IMM GRANULOCYTES NFR BLD: 0.4 %
INR PPP: 1.99 (ref 0.86–1.14)
LYMPHOCYTES # BLD AUTO: 1 10E9/L (ref 0.8–5.3)
LYMPHOCYTES NFR BLD AUTO: 14.9 %
MCH RBC QN AUTO: 25.2 PG (ref 26.5–33)
MCHC RBC AUTO-ENTMCNC: 29.7 G/DL (ref 31.5–36.5)
MCV RBC AUTO: 85 FL (ref 78–100)
MONOCYTES # BLD AUTO: 0.7 10E9/L (ref 0–1.3)
MONOCYTES NFR BLD AUTO: 10.6 %
NEUTROPHILS # BLD AUTO: 4.9 10E9/L (ref 1.6–8.3)
NEUTROPHILS NFR BLD AUTO: 71.9 %
PLATELET # BLD AUTO: 244 10E9/L (ref 150–450)
POTASSIUM SERPL-SCNC: 3.8 MMOL/L (ref 3.4–5.3)
PROT SERPL-MCNC: 6.5 G/DL (ref 6.8–8.8)
RBC # BLD AUTO: 4.05 10E12/L (ref 4.4–5.9)
SODIUM SERPL-SCNC: 137 MMOL/L (ref 133–144)
URATE SERPL-MCNC: 3.9 MG/DL (ref 3.5–7.2)
WBC # BLD AUTO: 6.8 10E9/L (ref 4–11)

## 2020-11-30 PROCEDURE — 84550 ASSAY OF BLOOD/URIC ACID: CPT | Performed by: INTERNAL MEDICINE

## 2020-11-30 PROCEDURE — 85610 PROTHROMBIN TIME: CPT | Performed by: INTERNAL MEDICINE

## 2020-11-30 PROCEDURE — 99214 OFFICE O/P EST MOD 30 MIN: CPT | Performed by: INTERNAL MEDICINE

## 2020-11-30 PROCEDURE — 36415 COLL VENOUS BLD VENIPUNCTURE: CPT | Performed by: INTERNAL MEDICINE

## 2020-11-30 PROCEDURE — 80053 COMPREHEN METABOLIC PANEL: CPT | Performed by: INTERNAL MEDICINE

## 2020-11-30 PROCEDURE — 85025 COMPLETE CBC W/AUTO DIFF WBC: CPT | Performed by: INTERNAL MEDICINE

## 2020-11-30 RX ORDER — BUPROPION HYDROCHLORIDE 150 MG/1
150 TABLET ORAL EVERY MORNING
Qty: 60 TABLET | Refills: 1 | Status: ON HOLD | OUTPATIENT
Start: 2020-11-30 | End: 2021-05-31

## 2020-11-30 RX ORDER — HYDROCODONE BITARTRATE AND ACETAMINOPHEN 5; 325 MG/1; MG/1
1 TABLET ORAL EVERY 6 HOURS PRN
Qty: 18 TABLET | Refills: 0 | Status: SHIPPED | OUTPATIENT
Start: 2020-11-30 | End: 2020-12-03

## 2020-11-30 ASSESSMENT — PAIN SCALES - GENERAL: PAINLEVEL: EXTREME PAIN (9)

## 2020-11-30 ASSESSMENT — MIFFLIN-ST. JEOR: SCORE: 1882.63

## 2020-11-30 NOTE — PROGRESS NOTES
ANTICOAGULATION FOLLOW-UP CLINIC VISIT    Patient Name:  Jim Willingham  Date:  2020  Contact Type:  Telephone    SUBJECTIVE:  Patient Findings     Comments:  Spoke to Jim.        Clinical Outcomes     Comments:  Spoke to Jim.           OBJECTIVE    Recent labs: (last 7 days)     20  0808   INR 1.99*       ASSESSMENT / PLAN  INR assessment SUB    Recheck INR In: 8 DAYS    INR Location Clinic      Anticoagulation Summary  As of 2020    INR goal:  2.0-3.0   TTR:  60.5 % (10.9 mo)   INR used for dosin.99 (2020)   Warfarin maintenance plan:  7.5 mg (5 mg x 1.5) every Sun, Thu; 5 mg (5 mg x 1) all other days   Full warfarin instructions:  7.5 mg every Sun, Thu; 5 mg all other days   Weekly warfarin total:  40 mg   No change documented:  Brendan Montoya RN   Plan last modified:  Delisa Hillman RN (9/15/2020)   Next INR check:  2020   Priority:  Critical   Target end date:  Indefinite    Indications    LVAD (left ventricular assist device) present (H) [Z95.811]  Long-term (current) use of anticoagulants [Z79.01] [Z79.01]  Chronic systolic congestive heart failure (H) [I50.22]             Anticoagulation Episode Summary     INR check location:      Preferred lab:      Send INR reminders to:   [facility]    Comments:  +++Patient drawn at Home care visit Q Mon. (8/10/20)+++  Labs drawn either at [facility] or [facility]  LVAD placed 17   II  ASA 81 mg daily      Anticoagulation Care Providers     Provider Role Specialty Phone number    Delisa Montgomery MD Referring Advanced Heart Failure and Transplant Cardiology [phone]            See the Encounter Report to view Anticoagulation Flowsheet and Dosing Calendar (Go to Encounters tab in chart review, and find the Anticoagulation Therapy Visit)    INR/CFX/F2 RESULT:INR result is 1.99    ASSESSMENT:Spoke with patient. Gave them their lab results and new warfarin recommendation.  No changes in health,  medication, or diet. No missed doses, no falls. No signs or symptoms of bleed or clotting.    DOSING ADJUSTMENT:Did not adjust dosing.  Patient takes 7.5mg on Sundays, this is not refected in today's INR result.    NEXT INR/FACTOR X OR FACTOR II:12/8    PROTOCOL FOLLOWED:goal 2-3  Patient had LVAD placed on:   6/19/17  Type of LVAD: HM 2  Patient's current Aspirin dose: 81mg  LVAD Protocol followed:  Yes.   If Not Followed Explanation:  Brendan Garcia, RN

## 2020-11-30 NOTE — PROGRESS NOTES
"Subjective     Traffordoswald Willingham is a 65 year old male who presents to clinic today for the following health issues:    HPI       The patient with multiple health problems as stated in the problem list has come back for follow-up.  Overall he is doing okay except feels tired, drained and not sleeping well.  He thinks it is related to his depression and wonders if his medication Celexa 30 mg a day could be increased.  He denies suicidal ideation.  His breathing has been okay as of late.  He is taking his medication as prescribed.  Recently his potassium was low and so he was asked to take more.  He is currently taking potassium 20 mEq a day and Bumex 5 mg twice a day.  His potassium and lab today look better.  Denies hypoglycemia episodes.  He states his sugars usually is good enough that he does not have to use a short acting insulin.  He is on Lantus 6 6 units daily.    Patient is complaining of severe left shoulder pain.  He has known arthritis and rotator cuff syndrome.  Prescribe Tylenol No. 3 by his orthopedic recently which has not helped.  He is now running out of it.  He would like something stronger.  He does have an upcoming appointment with his orthopedics physician.      Review of Systems   Constitutional, HEENT, cardiovascular, pulmonary, GI, , musculoskeletal, neuro, skin, endocrine and psych systems are negative, except as otherwise noted.      Objective    Pulse 66   Ht 1.727 m (5' 8\")   Wt 111.3 kg (245 lb 6.4 oz)   SpO2 96%   BMI 37.31 kg/m    Body mass index is 37.31 kg/m .  Physical Exam   GENERAL: healthy, alert and no distress  NECK: no adenopathy, no asymmetry, masses, or scars and thyroid normal to palpation  RESP: lungs clear to auscultation - no rales, rhonchi or wheezes  CV: LVED humming noise  ABDOMEN: soft, nontender, no hepatosplenomegaly, no masses and bowel sounds normal  MS: severely restricted ROM left shouldder    Results for orders placed or performed in visit on 11/30/20 (from " the past 24 hour(s))   INR   Result Value Ref Range    INR 1.99 (H) 0.86 - 1.14   Uric acid   Result Value Ref Range    Uric Acid 3.9 3.5 - 7.2 mg/dL   Comprehensive metabolic panel   Result Value Ref Range    Sodium 137 133 - 144 mmol/L    Potassium 3.8 3.4 - 5.3 mmol/L    Chloride 101 94 - 109 mmol/L    Carbon Dioxide 29 20 - 32 mmol/L    Anion Gap 7 3 - 14 mmol/L    Glucose 175 (H) 70 - 99 mg/dL    Urea Nitrogen 40 (H) 7 - 30 mg/dL    Creatinine 1.78 (H) 0.66 - 1.25 mg/dL    GFR Estimate 40 (L) >60 mL/min/[1.73_m2]    GFR Estimate If Black 46 (L) >60 mL/min/[1.73_m2]    Calcium 8.3 (L) 8.5 - 10.1 mg/dL    Bilirubin Total 0.7 0.2 - 1.3 mg/dL    Albumin 3.8 3.4 - 5.0 g/dL    Protein Total 6.5 (L) 6.8 - 8.8 g/dL    Alkaline Phosphatase 145 40 - 150 U/L    ALT 48 0 - 70 U/L    AST 39 0 - 45 U/L   CBC with platelets differential   Result Value Ref Range    WBC 6.8 4.0 - 11.0 10e9/L    RBC Count 4.05 (L) 4.4 - 5.9 10e12/L    Hemoglobin 10.2 (L) 13.3 - 17.7 g/dL    Hematocrit 34.3 (L) 40.0 - 53.0 %    MCV 85 78 - 100 fl    MCH 25.2 (L) 26.5 - 33.0 pg    MCHC 29.7 (L) 31.5 - 36.5 g/dL    RDW 17.9 (H) 10.0 - 15.0 %    Platelet Count 244 150 - 450 10e9/L    Diff Method Automated Method     % Neutrophils 71.9 %    % Lymphocytes 14.9 %    % Monocytes 10.6 %    % Eosinophils 1.2 %    % Basophils 1.0 %    % Immature Granulocytes 0.4 %    Absolute Neutrophil 4.9 1.6 - 8.3 10e9/L    Absolute Lymphocytes 1.0 0.8 - 5.3 10e9/L    Absolute Monocytes 0.7 0.0 - 1.3 10e9/L    Absolute Eosinophils 0.1 0.0 - 0.7 10e9/L    Absolute Basophils 0.1 0.0 - 0.2 10e9/L    Abs Immature Granulocytes 0.0 0 - 0.4 10e9/L     *Note: Due to a large number of results and/or encounters for the requested time period, some results have not been displayed. A complete set of results can be found in Results Review.           Assessment & Plan     1.  Major depression depression, recurrent and chronic.  His depressive symptoms are really not severe based on my  interview with him today.  However since he is feeling tired and fatigued I decided to switch him from Celexa to bupropion.  He is already on higher dose of Celexa 30 mg a day and did not want to increase the dose further.  He will start bupropion 150 mg XR daily.  Recheck in 2 months.  2.  Type 2 diabetes mellitus on both long and short acting insulin though he has hardly used short acting insulin lately because his sugars have been good.  He is a last A1c was 5.7 on 11/5/2020 so diabetes is under good control.  In fact I told him I would be more worried about hypoglycemia and that would require cutting back insulin.  So far he has not had issues with it.  3.  Nonischemic cardiomyopathy with ejection fraction 20%.  Patient on Bumex 10 mg a day, hydralazine total of 90 mg a day and on a rare occasion Zaroxolyn 2.5 mg daily as needed.  Clinically appears well compensated heart failure right now.  His weight is stable.  4.  Left ventricular assist device implanted 6/9/2017.  Chronic anticoagulate with Coumadin  5.  Chronic kidney disease stage IIIa with creatinine 1.78 GFR 40 today.  Stable in fact slight improvement.  6.  Chronic anticoagulation therapy for LVAD as well as paroxysmal atrial fib.  7.  Chronic left shoulder pain felt to be related to underlying osteoarthritis and rotator cuff at home.  I prescribed hydrocodone 5/325 1 every 6 hours as needed and 18 tablets given.  He does have an upcoming orthopedic appointment.  8.  Severe chronic obstructive lung disease.  Stable on bronchodilator therapy.  FEV1 54% predicted, FVC 32% predicted and ratio 48% on 9/15/2017.  9.  Paroxysmal atrial fibrillation by history.  10.  Implantable cardioverter defibrillator.  11.  Chronic anemia secondary to chronic disease and iron deficiency.  Hemoglobin remains around 10 g/dL.  12.  Bilateral carpal tunnel syndrome for which she recently received cortisone injection which is helped.  13.  Obstructive sleep apnea on  CPAP.  14.  Status post Sylvester-en-Y gastric bypass for obesity.  15.  Gouty arthropathy without tophi for which she is on allopurinol 300 mg a day and prednisone 5 mg for paradoxical flare prophylaxis.  16.   obesity class II.        Ricardo Gómez MD  [facility]

## 2020-11-30 NOTE — PROGRESS NOTES
Followed up with patient after increasing his bumex over the weekend. Patient had labs rechecked today, which were reviewed with him, as well as Melisaelmira Butler AFSANEH. Patient states his weight is down to 232, 2 lbs down from the start of the weekend. He reports some swelling in his LE still, though it has improved from what it had been. Information shared with AFSANEH, awaiting follow up instructions.

## 2020-12-01 ENCOUNTER — CARE COORDINATION (OUTPATIENT)
Dept: CARDIOLOGY | Facility: CLINIC | Age: 65
End: 2020-12-01

## 2020-12-01 DIAGNOSIS — I50.22 CHRONIC SYSTOLIC CONGESTIVE HEART FAILURE (H): Primary | ICD-10-CM

## 2020-12-01 NOTE — PROGRESS NOTES
Pt's labs were reviewed by ROGER Rivera. PA instructed to keep on current dose of bumex 5mg BID and KCL 20mEq BID and recheck labs in 1 week. PA also requested to see if pt can be seen in clinic prior to next scheduled appt on 12/18 - request sent to schedulers.   Called pt to review instructions. He reports he is feeling good. Weight today is 230lb. He received a steroid injection to his L wrist on 11/27 and pain has significantly improved. He also received a referral for a hand surgeon to discuss surgery for carpal tunnel.   Pt will get repeat labs drawn in 1 week.

## 2020-12-02 ENCOUNTER — TELEPHONE (OUTPATIENT)
Dept: PEDIATRICS | Facility: CLINIC | Age: 65
End: 2020-12-02

## 2020-12-02 NOTE — TELEPHONE ENCOUNTER
Central Prior Authorization Team   [phone]    PA Initiation    Medication: HYDROcodone-acetaminophen (NORCO) 5-325 MG tablet  Insurance Company: [employer] - Phone [phone] Fax [phone]  Pharmacy Filling the Rx: [facility] #[id number] [facility] [address] AT [facility]  [address]  Filling Pharmacy Phone: [phone]  Filling Pharmacy Fax: [phone]  Start Date: 12/2/2020

## 2020-12-04 DIAGNOSIS — I50.22 CHRONIC SYSTOLIC CONGESTIVE HEART FAILURE (H): Primary | ICD-10-CM

## 2020-12-04 NOTE — TELEPHONE ENCOUNTER
Prior Authorization Approval    Authorization Effective Date: 12/20/2020  Authorization Expiration Date: 12/2/2021  Medication: HYDROcodone-acetaminophen (NORCO) 5-325 MG tablet-APPROVED  Approved Dose/Quantity: 18 FOR 5 DAYS  Reference #: CASE # [id number]   Insurance Company: [employer] - Phone [phone] Fax [phone]  Which Pharmacy is filling the prescription (Not needed for infusion/clinic administered): [id number] [place] [address] AT [facility] [address]  Pharmacy Notified: Yes  Patient Notified: Yes

## 2020-12-07 ENCOUNTER — CARE COORDINATION (OUTPATIENT)
Dept: CARDIOLOGY | Facility: CLINIC | Age: 65
End: 2020-12-07

## 2020-12-07 NOTE — PROGRESS NOTES
Called pt to check in as he missed his clinic appt this am. Pt did not answer, left message requesting call back to VAD Coordinator on call.

## 2020-12-07 NOTE — PROGRESS NOTES
Pt called VAD Coordinator on-call. Pt missed his clinic appt today. Pt stated that his phone broke on Friday and therefore was unaware of his appt. Encouraged pt to reschedule appt. Pt stated that he has an appt already scheduled for 12/18 and pt would prefer to be seen then. Pt stated that he has been feeling well and pt denied concerns.  Encouraged pt to call VAD Coord on-call with any concerns; pt verbalized understanding. Will route note to pt's primary VAD Coordinator.

## 2020-12-08 ENCOUNTER — TELEPHONE (OUTPATIENT)
Dept: ANTICOAGULATION | Facility: CLINIC | Age: 65
End: 2020-12-08

## 2020-12-08 DIAGNOSIS — I50.22 CHRONIC SYSTOLIC CONGESTIVE HEART FAILURE (H): ICD-10-CM

## 2020-12-08 DIAGNOSIS — Z95.811 LVAD (LEFT VENTRICULAR ASSIST DEVICE) PRESENT (H): ICD-10-CM

## 2020-12-08 DIAGNOSIS — Z79.01 LONG TERM CURRENT USE OF ANTICOAGULANT THERAPY: ICD-10-CM

## 2020-12-08 NOTE — TELEPHONE ENCOUNTER
Called the patient and left message indicating an INR is due. Reminder given to have this done as soon as possible or contact the clinic.    We have notes that pt has [facility] and let pt know to call us back if they still come out to see him.

## 2020-12-09 ENCOUNTER — ANTICOAGULATION THERAPY VISIT (OUTPATIENT)
Dept: ANTICOAGULATION | Facility: CLINIC | Age: 65
End: 2020-12-09

## 2020-12-09 DIAGNOSIS — Z79.01 LONG TERM CURRENT USE OF ANTICOAGULANT THERAPY: ICD-10-CM

## 2020-12-09 DIAGNOSIS — I50.22 CHRONIC SYSTOLIC CONGESTIVE HEART FAILURE (H): ICD-10-CM

## 2020-12-09 DIAGNOSIS — Z95.811 LVAD (LEFT VENTRICULAR ASSIST DEVICE) PRESENT (H): ICD-10-CM

## 2020-12-09 LAB
ALBUMIN SERPL-MCNC: 4.1 G/DL (ref 3.4–5)
ALP SERPL-CCNC: 125 U/L (ref 40–150)
ALT SERPL W P-5'-P-CCNC: 46 U/L (ref 0–70)
ANION GAP SERPL CALCULATED.3IONS-SCNC: 6 MMOL/L (ref 3–14)
AST SERPL W P-5'-P-CCNC: 40 U/L (ref 0–45)
BILIRUB SERPL-MCNC: 0.7 MG/DL (ref 0.2–1.3)
BUN SERPL-MCNC: 45 MG/DL (ref 7–30)
CALCIUM SERPL-MCNC: 8.8 MG/DL (ref 8.5–10.1)
CHLORIDE SERPL-SCNC: 104 MMOL/L (ref 94–109)
CO2 SERPL-SCNC: 28 MMOL/L (ref 20–32)
CREAT SERPL-MCNC: 2.03 MG/DL (ref 0.66–1.25)
ERYTHROCYTE [DISTWIDTH] IN BLOOD BY AUTOMATED COUNT: 18.7 % (ref 10–15)
GFR SERPL CREATININE-BSD FRML MDRD: 34 ML/MIN/{1.73_M2}
GLUCOSE SERPL-MCNC: 123 MG/DL (ref 70–99)
HCT VFR BLD AUTO: 34.5 % (ref 40–53)
HGB BLD-MCNC: 10 G/DL (ref 13.3–17.7)
INR PPP: 1.8 (ref 0.86–1.14)
LDH SERPL L TO P-CCNC: 377 U/L (ref 85–227)
MCH RBC QN AUTO: 24.8 PG (ref 26.5–33)
MCHC RBC AUTO-ENTMCNC: 29 G/DL (ref 31.5–36.5)
MCV RBC AUTO: 85 FL (ref 78–100)
PLATELET # BLD AUTO: 238 10E9/L (ref 150–450)
POTASSIUM SERPL-SCNC: 3.8 MMOL/L (ref 3.4–5.3)
PROT SERPL-MCNC: 7.1 G/DL (ref 6.8–8.8)
RBC # BLD AUTO: 4.04 10E12/L (ref 4.4–5.9)
SODIUM SERPL-SCNC: 138 MMOL/L (ref 133–144)
WBC # BLD AUTO: 6.2 10E9/L (ref 4–11)

## 2020-12-09 PROCEDURE — 36415 COLL VENOUS BLD VENIPUNCTURE: CPT | Performed by: PHYSICIAN ASSISTANT

## 2020-12-09 PROCEDURE — 85027 COMPLETE CBC AUTOMATED: CPT | Performed by: PHYSICIAN ASSISTANT

## 2020-12-09 PROCEDURE — 85610 PROTHROMBIN TIME: CPT | Performed by: PHYSICIAN ASSISTANT

## 2020-12-09 PROCEDURE — 83615 LACTATE (LD) (LDH) ENZYME: CPT | Performed by: PHYSICIAN ASSISTANT

## 2020-12-09 PROCEDURE — 80053 COMPREHEN METABOLIC PANEL: CPT | Performed by: PHYSICIAN ASSISTANT

## 2020-12-09 NOTE — PROGRESS NOTES
ANTICOAGULATION FOLLOW-UP CLINIC VISIT    Patient Name:  Jim Willingham  Date:  2020  Contact Type:  Telephone    SUBJECTIVE:  Patient Findings     Comments:  Patient reports eating brusell sprouts and broccoli the last week.         Clinical Outcomes     Comments:  Patient reports eating brusell sprouts and broccoli the last week.            OBJECTIVE    Recent labs: (last 7 days)     20  1236   INR 1.80*       ASSESSMENT / PLAN  No question data found.  Anticoagulation Summary  As of 2020    INR goal:  2.0-3.0   TTR:  60.4 % (10.9 mo)   INR used for dosin.80 (2020)   Warfarin maintenance plan:  7.5 mg (5 mg x 1.5) every Sun, Wed, Fri; 5 mg (5 mg x 1) all other days   Full warfarin instructions:  12/10: 7.5 mg; Otherwise 7.5 mg every Sun, Wed, Fri; 5 mg all other days   Weekly warfarin total:  42.5 mg   Plan last modified:  Maru Alonso RN (2020)   Next INR check:  2020   Priority:  Critical   Target end date:  Indefinite    Indications    LVAD (left ventricular assist device) present (H) [Z95.811]  Long-term (current) use of anticoagulants [Z79.01] [Z79.01]  Chronic systolic congestive heart failure (H) [I50.22]             Anticoagulation Episode Summary     INR check location:      Preferred lab:      Send INR reminders to:   [facility]    Comments:  +++Patient drawn at Home care visit Q Mon. (8/10/20)+++  Labs drawn either at [facility] or [facility]  LVAD placed 17   II  ASA 81 mg daily      Anticoagulation Care Providers     Provider Role Specialty Phone number    Delisa Montgomery MD Referring Advanced Heart Failure and Transplant Cardiology [phone]            See the Encounter Report to view Anticoagulation Flowsheet and Dosing Calendar (Go to Encounters tab in chart review, and find the Anticoagulation Therapy Visit)    Spoke with patient.     Maru Alonso RN

## 2020-12-11 ENCOUNTER — ANCILLARY PROCEDURE (OUTPATIENT)
Dept: GENERAL RADIOLOGY | Facility: CLINIC | Age: 65
End: 2020-12-11
Attending: FAMILY MEDICINE
Payer: COMMERCIAL

## 2020-12-11 ENCOUNTER — VIRTUAL VISIT (OUTPATIENT)
Dept: CARDIOLOGY | Facility: CLINIC | Age: 65
End: 2020-12-11
Attending: INTERNAL MEDICINE
Payer: COMMERCIAL

## 2020-12-11 ENCOUNTER — OFFICE VISIT (OUTPATIENT)
Dept: ORTHOPEDICS | Facility: CLINIC | Age: 65
End: 2020-12-11
Payer: COMMERCIAL

## 2020-12-11 ENCOUNTER — CARE COORDINATION (OUTPATIENT)
Dept: CARDIOLOGY | Facility: CLINIC | Age: 65
End: 2020-12-11

## 2020-12-11 ENCOUNTER — ANTICOAGULATION THERAPY VISIT (OUTPATIENT)
Dept: ANTICOAGULATION | Facility: CLINIC | Age: 65
End: 2020-12-11

## 2020-12-11 DIAGNOSIS — I42.8 NONISCHEMIC CARDIOMYOPATHY (H): ICD-10-CM

## 2020-12-11 DIAGNOSIS — I42.9 CARDIOMYOPATHY (H): ICD-10-CM

## 2020-12-11 DIAGNOSIS — Z95.811 LVAD (LEFT VENTRICULAR ASSIST DEVICE) PRESENT (H): Primary | ICD-10-CM

## 2020-12-11 DIAGNOSIS — Z95.811 LVAD (LEFT VENTRICULAR ASSIST DEVICE) PRESENT (H): ICD-10-CM

## 2020-12-11 DIAGNOSIS — I49.01 VENTRICULAR FIBRILLATION (H): ICD-10-CM

## 2020-12-11 DIAGNOSIS — I50.22 CHRONIC SYSTOLIC CONGESTIVE HEART FAILURE (H): ICD-10-CM

## 2020-12-11 DIAGNOSIS — I48.0 PAROXYSMAL ATRIAL FIBRILLATION (H): ICD-10-CM

## 2020-12-11 DIAGNOSIS — I50.22 CHRONIC SYSTOLIC CONGESTIVE HEART FAILURE (H): Primary | ICD-10-CM

## 2020-12-11 DIAGNOSIS — I10 BENIGN ESSENTIAL HYPERTENSION: ICD-10-CM

## 2020-12-11 DIAGNOSIS — I50.20 HEART FAILURE WITH REDUCED EJECTION FRACTION, NYHA CLASS III (H): ICD-10-CM

## 2020-12-11 DIAGNOSIS — M67.912 TENDINOPATHY OF ROTATOR CUFF, LEFT: Primary | ICD-10-CM

## 2020-12-11 DIAGNOSIS — M25.512 PAIN IN JOINT OF LEFT SHOULDER: ICD-10-CM

## 2020-12-11 DIAGNOSIS — Z79.01 LONG TERM CURRENT USE OF ANTICOAGULANT THERAPY: ICD-10-CM

## 2020-12-11 LAB
ALBUMIN SERPL-MCNC: 4 G/DL (ref 3.4–5)
ALP SERPL-CCNC: 135 U/L (ref 40–150)
ALT SERPL W P-5'-P-CCNC: 51 U/L (ref 0–70)
ANION GAP SERPL CALCULATED.3IONS-SCNC: 8 MMOL/L (ref 3–14)
AST SERPL W P-5'-P-CCNC: 45 U/L (ref 0–45)
BILIRUB SERPL-MCNC: 0.7 MG/DL (ref 0.2–1.3)
BUN SERPL-MCNC: 50 MG/DL (ref 7–30)
CALCIUM SERPL-MCNC: 8.8 MG/DL (ref 8.5–10.1)
CHLORIDE SERPL-SCNC: 96 MMOL/L (ref 94–109)
CO2 SERPL-SCNC: 31 MMOL/L (ref 20–32)
CREAT SERPL-MCNC: 2.16 MG/DL (ref 0.66–1.25)
ERYTHROCYTE [DISTWIDTH] IN BLOOD BY AUTOMATED COUNT: 18.7 % (ref 10–15)
GFR SERPL CREATININE-BSD FRML MDRD: 31 ML/MIN/{1.73_M2}
GLUCOSE SERPL-MCNC: 153 MG/DL (ref 70–99)
HCT VFR BLD AUTO: 34.6 % (ref 40–53)
HGB BLD-MCNC: 10.7 G/DL (ref 13.3–17.7)
INR PPP: 1.99 (ref 0.86–1.14)
LDH SERPL L TO P-CCNC: 391 U/L (ref 85–227)
MAGNESIUM SERPL-MCNC: 2.3 MG/DL (ref 1.6–2.3)
MCH RBC QN AUTO: 25.3 PG (ref 26.5–33)
MCHC RBC AUTO-ENTMCNC: 30.9 G/DL (ref 31.5–36.5)
MCV RBC AUTO: 82 FL (ref 78–100)
PLATELET # BLD AUTO: 262 10E9/L (ref 150–450)
POTASSIUM SERPL-SCNC: 3.7 MMOL/L (ref 3.4–5.3)
PROT SERPL-MCNC: 7.2 G/DL (ref 6.8–8.8)
RBC # BLD AUTO: 4.23 10E12/L (ref 4.4–5.9)
SODIUM SERPL-SCNC: 135 MMOL/L (ref 133–144)
WBC # BLD AUTO: 8.2 10E9/L (ref 4–11)

## 2020-12-11 PROCEDURE — 93295 DEV INTERROG REMOTE 1/2/MLT: CPT | Performed by: INTERNAL MEDICINE

## 2020-12-11 PROCEDURE — 99207 PR DROP WITH A PROCEDURE: CPT | Performed by: FAMILY MEDICINE

## 2020-12-11 PROCEDURE — 20611 DRAIN/INJ JOINT/BURSA W/US: CPT | Mod: LT | Performed by: FAMILY MEDICINE

## 2020-12-11 PROCEDURE — 93296 REM INTERROG EVL PM/IDS: CPT

## 2020-12-11 PROCEDURE — 83615 LACTATE (LD) (LDH) ENZYME: CPT | Performed by: INTERNAL MEDICINE

## 2020-12-11 PROCEDURE — 36415 COLL VENOUS BLD VENIPUNCTURE: CPT | Performed by: INTERNAL MEDICINE

## 2020-12-11 PROCEDURE — 80053 COMPREHEN METABOLIC PANEL: CPT | Performed by: INTERNAL MEDICINE

## 2020-12-11 PROCEDURE — 83735 ASSAY OF MAGNESIUM: CPT | Performed by: PHYSICIAN ASSISTANT

## 2020-12-11 PROCEDURE — 73030 X-RAY EXAM OF SHOULDER: CPT | Mod: LT | Performed by: RADIOLOGY

## 2020-12-11 PROCEDURE — 85027 COMPLETE CBC AUTOMATED: CPT | Performed by: INTERNAL MEDICINE

## 2020-12-11 PROCEDURE — 85610 PROTHROMBIN TIME: CPT | Performed by: INTERNAL MEDICINE

## 2020-12-11 PROCEDURE — 99214 OFFICE O/P EST MOD 30 MIN: CPT | Mod: 25 | Performed by: INTERNAL MEDICINE

## 2020-12-11 RX ORDER — BUMETANIDE 2 MG/1
TABLET ORAL
Qty: 540 TABLET | Refills: 3 | COMMUNITY
Start: 2020-12-11 | End: 2020-12-18

## 2020-12-11 RX ORDER — POTASSIUM CHLORIDE 750 MG/1
TABLET, EXTENDED RELEASE ORAL
Qty: 270 TABLET | Refills: 3 | COMMUNITY
Start: 2020-12-11 | End: 2020-12-18

## 2020-12-11 RX ORDER — TRIAMCINOLONE ACETONIDE 40 MG/ML
40 INJECTION, SUSPENSION INTRA-ARTICULAR; INTRAMUSCULAR
Status: DISCONTINUED | OUTPATIENT
Start: 2020-12-11 | End: 2021-01-11

## 2020-12-11 RX ADMIN — TRIAMCINOLONE ACETONIDE 40 MG: 40 INJECTION, SUSPENSION INTRA-ARTICULAR; INTRAMUSCULAR at 10:16

## 2020-12-11 ASSESSMENT — PAIN SCALES - GENERAL: PAINLEVEL: SEVERE PAIN (7)

## 2020-12-11 NOTE — PROGRESS NOTES
Jim Willingham's chief complaint for this visit includes:  Chief Complaint   Patient presents with     RECHECK     Follow up for left shoulder, requesting left shoulder injection      PCP: Ricardo Gómez    Referring Provider:  No referring provider defined for this encounter.    There were no vitals taken for this visit.  Severe Pain (7)     Do you need any medication refills at today's visit? No      Large Joint Injection/Arthocentesis: L subacromial bursa    Date/Time: 12/11/2020 10:16 AM  Performed by: Nba Ag DO  Authorized by: Nba Ag DO     Indications:  Pain  Needle Size:  22 G  Guidance: ultrasound    Location:  Shoulder      Site:  L subacromial bursa  Medications:  40 mg triamcinolone 40 MG/ML  Procedure discussed: discussed risks, benefits, and alternatives    Consent Given by:  Patient  Timeout: timeout called immediately prior to procedure    Prep: patient was prepped and draped in usual sterile fashion     NDC: [address]-2

## 2020-12-11 NOTE — PROGRESS NOTES
"Jim Willingham is a 65 year old male who is being evaluated via a billable video visit.      The patient has been notified of following:     \"This video visit will be conducted via a call between you and your physician/provider. We have found that certain health care needs can be provided without the need for an in-person physical exam.  This service lets us provide the care you need with a video conversation.  If a prescription is necessary we can send it directly to your pharmacy.  If lab work is needed we can place an order for that and you can then stop by our lab to have the test done at a later time.    Video visits are billed at different rates depending on your insurance coverage.  Please reach out to your insurance provider with any questions.    If during the course of the call the physician/provider feels a video visit is not appropriate, you will not be charged for this service.\"    Patient has given verbal consent for Video visit? Yes  How would you like to obtain your AVS? MyChart  If you are dropped from the video visit, the video invite should be resent to: Patient will be logging into My Chart  Will anyone else be joining your video visit? No         Video-Visit Details    Type of service:  Video Visit    Video Start Time: 2:27 PM  Video End Time: 3:03 PM    Originating Location (pt. Location): Home    Distant Location (provider location):  [facility]     Platform used for Video Visit: [employer]    December 11, 2020   Jim Willingham is a 64 year old male with a past medical history of NICM (EF 20%) s/p HM II LVAD placement (6/19/17) at  (obesity) Other relavant history includes a. Fib, chronic kidney disease stage III, diabetes mellitus type 2, RV failure, CECILIA, obesity, hypertension, COPD, asthma. He is here for urgent heart failure and LVAD follow up.    10/22 Dr. Sinclair  Seen for history of CoNS bacteremia. Plan: - Will complete a 6 week course of IV vancomycin " (stop date 10/30).Will repeat blood cx x2 ~5-7 days after stopping abx.  PICC can be pulled at that time. If he has recurrent CoNS bacteremia, would then plan on lifelong suppression with oral antibiotics.      Over the past few visits he did have escalating diuretic doses. He left the hospital on 1mg buex BID and now uptitrated to 5mg BID. Weight has been increasing. He did have RHC to correlate the numbers, once he was very dry while other time he was volume overloaded. Very challenging fluid management.  Today I am seeing him as an urgent appointment following an ICD shock  Today around 450 in the morning he received an appropriate ICD shock which was at a high rate above 220 bpm V. fib arrest.  The shock was successful and he was back in sinus rhythm afterwards.  Interrogation reviewed impression.  He tells me that over the past week or so he has lost a lot of weight and currently he weighs about 223 pounds.  He is urinating quite a bit with a 5 mg twice daily Bumex dosing.  He does think on the same hand that he did gain quite a bit of food weight.  He takes all medications as prescribed.  He feels a little bit lousy today after the stroke but otherwise denies any complaints.  Denies any bleeding no dizziness lightheadedness no syncope.  No strokelike symptoms no headaches at this point.  He did have labs checked today potassium was slightly low and creatinine was 2.16, higher than before especially from a few weeks from a month ago when it was like 1.5.  No other complaints today.  LVAD parameters reviewed with him.  Flow currently 6.7 L/min PI 68.6 which is lower than usually for him is running on the 4.5 range is reviewed, he is power is 6.8 and his speed is set at 9400.      Cardiac Medications  ASA 81 mg daily  Coumadin  Bumex 5 twice daily  Amiodarone 400 mg once a day  Hydralazine 30 TID  KCl 20/10 daily        PAST MEDICAL HISTORY:  Past Medical History:   Diagnosis Date     Benign essential  hypertension 2017     Bilateral carpal tunnel syndrome 2020     Cardiomyopathy, unspecified (H) 2017     CKD (chronic kidney disease) stage 3, GFR 30-59 ml/min 2017     COPD (chronic obstructive pulmonary disease) (H) 2020     Depression 2017     Diabetes mellitus (H) 2017     Gouty arthropathy, chronic, without tophi 2020     H/O gastric bypass 2017     ICD (implantable cardioverter-defibrillator), biventricular, in situ 2017     LVAD (left ventricular assist device) present (H)      Major depression, recurrent, chronic (H) 2020     NICM (nonischemic cardiomyopathy) (H)/ EF 20% 2017    ECHO: LVEDd. 7.66 cm, Restrictive pattern , Severe mitral valve regurgitation     CECILIA (obstructive sleep apnea) 2017     Paroxysmal atrial fibrillation (H) 2017     Paroxysmal VT (H) 2017     Rotator cuff tear arthropathy of both shoulders 2020     Uncomplicated asthma      Vitamin B12 deficiency (non anemic) 2017     FAMILY HISTORY:  Family History   Problem Relation Age of Onset     Cerebrovascular Disease Mother 64     Diabetes Mother      Hypertension Mother      Coronary Artery Disease Father      Diabetes Type 2  Father      Obesity Brother      Obesity Brother      Cerebrovascular Disease Daughter 40     SOCIAL HISTORY:  Social History     Socioeconomic History     Marital status:      Spouse name: Not on file     Number of children: Not on file     Years of education: Not on file     Highest education level: Not on file   Occupational History     Not on file   Social Needs     Financial resource strain: Not on file     Food insecurity     Worry: Not on file     Inability: Not on file     Transportation needs     Medical: Not on file     Non-medical: Not on file   Tobacco Use     Smoking status: Former Smoker     Quit date:      Years since quittin.9     Smokeless tobacco: Never Used   Substance and Sexual Activity     Alcohol  use: No     Drug use: No     Sexual activity: Yes     Partners: Female   Lifestyle     Physical activity     Days per week: Not on file     Minutes per session: Not on file     Stress: Not on file   Relationships     Social connections     Talks on phone: Not on file     Gets together: Not on file     Attends [employer] service: Not on file     Active member of club or organization: Not on file     Attends meetings of clubs or organizations: Not on file     Relationship status: Not on file     Intimate partner violence     Fear of current or ex partner: Not on file     Emotionally abused: Not on file     Physically abused: Not on file     Forced sexual activity: Not on file   Other Topics Concern     Parent/sibling w/ CABG, MI or angioplasty before 65F 55M? No   Social History Narrative     Not on file     CURRENT MEDICATIONS:  Current Outpatient Medications   Medication     acetaminophen (TYLENOL) 325 MG tablet     albuterol (PROAIR HFA) 108 (90 Base) MCG/ACT inhaler     allopurinol (ZYLOPRIM) 300 MG tablet     amiodarone (PACERONE) 200 MG tablet     aspirin 81 MG chewable tablet     blood glucose monitoring (ONE TOUCH ULTRA 2) meter device kit     blood glucose VI test strip     bumetanide (BUMEX) 2 MG tablet     buPROPion (WELLBUTRIN XL) 150 MG 24 hr tablet     cyanocobalamin (VITAMIN B12) 1000 MCG/ML injection     fluticasone-vilanterol (BREO ELLIPTA) 200-25 MCG/INH oral inhaler     gabapentin (NEURONTIN) 300 MG capsule     hydrALAZINE (APRESOLINE) 10 MG tablet     insulin glargine (LANTUS SOLOSTAR) 100 UNIT/ML pen     insulin lispro (HUMALOG KWIKPEN) 100 UNIT/ML (1 unit dial) KWIKPEN     insulin pen needle (32G X 4 MM) 32G X 4 MM miscellaneous     metolazone (ZAROXOLYN) 2.5 MG tablet     montelukast (SINGULAIR) 10 MG tablet     pantoprazole (PROTONIX) 40 MG EC tablet     potassium chloride ER (KLOR-CON M) 10 MEQ CR tablet     predniSONE (DELTASONE) 5 MG tablet     traMADol (ULTRAM) 50 MG tablet     umeclidinium  (INCRUSE ELLIPTA) 62.5 MCG/INH oral inhaler     warfarin ANTICOAGULANT (COUMADIN) 5 MG tablet     zinc sulfate (ZINCATE) 220 (50 Zn) MG capsule     Current Facility-Administered Medications   Medication     methylPREDNISolone (DEPO-MEDROL) injection 20 mg     methylPREDNISolone (DEPO-MEDROL) injection 20 mg     triamcinolone (KENALOG-40) injection 40 mg     ROS:   Constitutional: No fever, chills, or sweats.   ENT: No visual disturbance, ear ache, epistaxis, sore throat.   Allergies/Immunologic: Negative.   Respiratory: No cough, hemoptysis.   Cardiovascular: As per HPI.   GI: No nausea, vomiting, hematemesis, melena, or hematochezia.   : No urinary frequency, dysuria, or hematuria.   Integument: Negative.   Psychiatric: Pleasant, no major depression noted  Neuro: No focal neurological deficits noted  Endocrinology: Negative.   Musculoskeletal: As per HPI.      EXAM:  GENERAL: Appears comfortable, in no acute distress.   HEENT: Eye symmetrical, no discharge or icterus bilaterally. Mucous membranes moist and without lesions.  CV:  Unable to auscultate  RESPIRATORY: Unable to auscultate  GI: No distention noted  EXTREMITIES:  Trace lower extremity edema  NEUROLOGIC: Alert and interacting appropriately. No focal deficits.   MUSCULOSKELETAL: No joint swelling or tenderness.   SKIN: No jaundice. No rashes or lesions. Driveline dressing c     Labs:  Lab Results   Component Value Date    WBC 8.2 12/11/2020    HGB 10.7 (L) 12/11/2020    HCT 34.6 (L) 12/11/2020     12/11/2020     12/11/2020    POTASSIUM 3.7 12/11/2020    CHLORIDE 96 12/11/2020    CO2 31 12/11/2020    BUN 50 (H) 12/11/2020    CR 2.16 (H) 12/11/2020     (H) 12/11/2020    SED 10 10/27/2020    DD 0.4 04/01/2020    NTBNPI 1,162 (H) 08/05/2020    NTBNP 866 (H) 07/31/2019    TROPI 0.018 08/05/2020    AST 45 12/11/2020    ALT 51 12/11/2020    ALKPHOS 135 12/11/2020    BILITOTAL 0.7 12/11/2020    INR 1.99 (H) 12/11/2020 8/5/20 RHC    Time   Systolic  Diastolic  Mean  A Wave  V Wave  EDP  Max dp/dt  HR    RA Pressures   3:41 PM      6 mmHg     7 mmHg     7 mmHg         107 bpm       RV Pressures   3:42 PM  24 mmHg             6 mmHg       153 bpm       PA Pressures   3:43 PM  23 mmHg     11 mmHg     15 mmHg             156 bpm       PCW Pressures   3:42 PM      4 mmHg     4 mmHg     5 mmHg         153 bpm          Cardiac Output Results   3:32 PM  4.03 L/min     1.83 L/min/m2     4.05 L/min     1.84 L/min/m2            3:43 PM  4.03 L/min                     7/15/2020 ICD check  In clinic device appointment conversion to remote device appointment to minimize COVID19 exposure for high risk patient populations.  Scheduled remote ICD transmission received and reviewed. Device transmission sent per MD orders. Patient has a [employer]tronic multi lead ICD programmed AAIR. Normal ICD function. No episodes recorded. No arrhythmias recorded. Presenting EGM = SR @ 70 bpm. AP = 0%.  = 0%. BVP = 0%. OptiVol fluid index is at baseline. Estimated battery longevity to MARVA = 6 months. Battery voltage = 2.88V. No short v-v intervals recorded. Lead impedance trends appear stable. Patient notified of interrogation results. Patient reports that he is feeling well and denies complaints. Plan for patient to send a remote transmission in 3 months as scheduled.  MIKY Feliz RN     8/5/20 ECHO   LVAD cannula was seen in the expected anatomic position in the LV apex.  HM2 at 9400RPM.  LVIDd 61mm.  Septum normal.  Aortic valve remain closed.  Normal flow velocities.  Global right ventricular function is mildly to moderately reduced.  The inferior vena cava is normal.  No pericardial effusion is present.     1/20/2020 EHCOInterpretation Summary  Technically difficult study. Patients heart rate is in the 120s-150s during  the study.  HM2 LVAD is present and is funcioning at 9400RPM  Severely (EF 10-15%) reduced left ventricular function is present. Severe left  ventricular dilation is  present. LVIDd is 6.6cm LVAD cannula in the apex is  not well visualized. Inflow and outflow velocities could not be obtained.  Aortic valve appears to open minimally with each beat. Septum is probably  midline but again difficult to visualize.  Global right ventricular function is moderately to severely reduced.  IVC diameter >2.1 cm collapsing <50% with sniff suggests a high RA pressure  estimated at 15 mmHg or greater. No pericardial effusion is present.     Compared to prior study on 1/16/20, LVIDd appears slightly larger. Patient is  severely tachycardic now. IVC appears more dilated.     7/24/2019 RHC    Time  Systolic  Diastolic  Mean  A Wave  V Wave  EDP  Max dp/dt  HR    RA Pressures  10:04 AM      10 mmHg     95415 mmHg     14693 mmHg         55 bpm       RV Pressures   9:42 AM              384 mmHg/sec           10:01 AM  25 mmHg             10 mmHg       39 bpm       PA Pressures  10:01 AM  25 mmHg     16 mmHg     19 mmHg             80 bpm       PCW Pressures  10:02 AM      11 mmHg     24702 mmHg     23369 mmHg         68 bpm            Time  TDCO  TDCI  Kee C.O.  Kee C.I.  Kee HR    Cardiac Output Results   9:42 AM  4.63 L/min     2.02 L/min/m2     4.62 L/min     2.01 L/min/m2               _____________________________________________________________________________     Assessment and Plan:   Jim Willingham is a 65 year old male with a past medical history of NICM (EF 20%) s/p HM II LVAD placement (6/19/17) at [facility] (obesity) Other relavant history includes chronic kidney disease stage III, diabetes mellitus type 2, RV failure, CECILIA, obesity, hypertension, COPD, asthma. He presents for LVAD f/u urgently following ICD shock.     Overall LVAD parameters and weight trends are satisfactory catheterization with echo reviewed recently.  Patient what he is telling me that the ICD shock and the available data I do believe that he is clinically on the dry side.  His potassium was a little bit low but I do not  think it was low enough to explain VF arrest.  He could have had a silent event.  At this time as we discussed that he is going to skip the Bumex dose tonight and then he will start taking Bumex 3 mg twice daily.  We will give a little bit of extra potassium 20 mEq in the morning and continue with 20 mEq in the afternoon.  He will continue the amiodarone and other medications with no changes.  LVH was stable.  I have very low suspicion for any LVAD malfunction or any LVAD thrombus.  I do think it probably was a volume related issue and shock.     #  Chronic systolic heart failure secondary to NICM  Stage D  NYHA Class IIIA  ACEi/ARB: Losartan stopped d/t fluctuating renal function, continue hydralazine.  BB: Stopped during recent admission  Aldosterone antagonist: Stopped d/t hyperkalemia.  SCD prophylaxis: ICD in place, most recent appropriate shock on 12/11/2020  Fluid status:   Appears hypervolemic today management as above    #  S/P LVAD implant inititally as DT due to obesity, now within goal BMI range and being evuated for transplant  Anticoagulation: Warfarin with INR goal of 2-3,  dosing per coumadin clinic  Antiplatelet: Aspirin 81 mg daily  MAP: Goal 65-85  LDH: Stable, 391, BL around 380-450     VAD Interrogation on 11/4/2020. VAD interrogation reviewed with VAD coordinator. Agree with findings. Occasional PI events. No power spikes, speed drops, or other findings suspicious of pump malfunction noted.      # RV failure  - Now off metoprolol, continue amidoarone  - Ongoing transplant evaluation     # Obesity:  Recommend weight loss with a goal weight of 255 which he has now met. Continue additional weight loss as able     # A. Fib  New in Jan. Now S/p Cardioversion in 5/2020. Symptoms improved dramatically. In sinus on last ICD check. No a. Fib episodes since cardioversion  - Continue amiodarone and coumadin     # CECILIA: Continue CPAP.       # Chronic kidney disease: Significantly fluctuating creatinine  however increasing trend recently.  I think this is probably related to diuretics and being allergic on the dry side.  Management as above we will repeat basic metabolic panel on Monday and then he has an appointment on Friday.  Repeat BMP.     # Iron deficiency anemia  He did receive IV iron infusions.  We will continue to monitor closely.     Follow-up  - Needs PFT and 6 min walk for transplant eval  - RTC in December as scheduled that is this upcoming Friday    I appreciate the opportunity to participate in the care of Jim Willingham . Please do not hesitate to contact me with any further questions.      Sincerely,      Nicola Seth MD     [facility] Division of Cardiology

## 2020-12-11 NOTE — PROGRESS NOTES
Jim called this morning to say he got an ICD shock while he was sleeping at 450am. He said he feels fine now. He sent in an ICD transmission. The ICD nurse reportedly left him a message to get some labs today before his shoulder injection appointment. I added a set of labs for today.    I will follow up with the device nurse.

## 2020-12-11 NOTE — LETTER
2020         RE: Jim Willingham  [address] [facility] [address]        Dear Colleague,    Thank you for referring your patient, Jim Willingham, to the [facility] [place]. Please see a copy of my visit note below.    Jim Willingham's chief complaint for this visit includes:  Chief Complaint   Patient presents with     RECHECK     Follow up for left shoulder, requesting left shoulder injection      PCP: Ricardo Gómez    Referring Provider:  No referring provider defined for this encounter.    There were no vitals taken for this visit.  Severe Pain (7)     Do you need any medication refills at today's visit? No      Large Joint Injection/Arthocentesis: L subacromial bursa    Date/Time: 2020 10:16 AM  Performed by: Nba Ag DO  Authorized by: Nba Ag DO     Indications:  Pain  Needle Size:  22 G  Guidance: ultrasound    Location:  Shoulder      Site:  L subacromial bursa  Medications:  40 mg triamcinolone 40 MG/ML  Procedure discussed: discussed risks, benefits, and alternatives    Consent Given by:  Patient  Timeout: timeout called immediately prior to procedure    Prep: patient was prepped and draped in usual sterile fashion     NDC: [address]-3        PROCEDURE ENCOUNTER    [facility]  Orthopedics  Nba Ag DO  2020     Name: Jim Willingham  MRN: [id number]  Age: 65 year old  : 1955    Referring provider: None  Diagnosis: Rotator cuff tendinopathy of left shoulder    Subacromial Bursa - Ultrasound Guided  The patient was informed of the risks and the benefits of the procedure and a written consent was signed.  The patient s left shoulder was prepped with chlorhexidine in sterile fashion.   40 mg of triamcinolone suspension was drawn up into a 5 mL syringe with 4 mL of 1% lidocaine.  Injection was performed using sterile technique.  Under ultrasound guidance a 1.5-inch 22-gauge needle was used to enter the left  subacromial bursa.  Anterolateral approach was used with arm held in Crass position.  Needle placement was visualized and documented with ultrasound.  Ultrasound visualization was necessary to ensure placement in to the bursa and not the rotator cuff tendon which could potentially cause further tendon damage.  Injection performed long axis to the probe.  Injection solution visualized within the joint space.  Images were permanently stored for the patient's record.  There were no complications. The patient tolerated the procedure well. There was negligible bleeding.   The patient was instructed to ice the shoulder upon leaving clinic and refrain from overuse over the next 3 days.   The patient was instructed to call or go to the emergency room with any unusual pain, swelling, redness, or if otherwise concerned.  Nba Ag DO [facility]  Primary Care Sports Medicine  [facility]              Again, thank you for allowing me to participate in the care of your patient.        Sincerely,        Nba Ag DO

## 2020-12-11 NOTE — PATIENT INSTRUCTIONS
Thanks for coming today.  Ortho/Sports Medicine Clinic  [address] [place] [address]    To schedule future appointments in Ortho Clinic, you may call [phone].    To schedule ordered imaging by your provider:   Call Central Imaging [phone]    To schedule an injection ordered by your provider:  Call Central Imaging Injection scheduling line: [phone]  [email or web address] available online at:  [email or web address]    Please call if any further questions or concerns ([phone]).  Clinic hours 8 am to 5 pm.    Return to clinic (call) if symptoms worsen or fail to improve.

## 2020-12-11 NOTE — PROGRESS NOTES
PROCEDURE ENCOUNTER    [facility]  Orthopedics  Nba Ag DO  2020     Name: Jim Willingham  MRN: [id number]  Age: 65 year old  : 1955    Referring provider: None  Diagnosis: Rotator cuff tendinopathy of left shoulder    Subacromial Bursa - Ultrasound Guided  The patient was informed of the risks and the benefits of the procedure and a written consent was signed.  The patient s left shoulder was prepped with chlorhexidine in sterile fashion.   40 mg of triamcinolone suspension was drawn up into a 5 mL syringe with 4 mL of 1% lidocaine.  Injection was performed using sterile technique.  Under ultrasound guidance a 1.5-inch 22-gauge needle was used to enter the left subacromial bursa.  Anterolateral approach was used with arm held in Crass position.  Needle placement was visualized and documented with ultrasound.  Ultrasound visualization was necessary to ensure placement in to the bursa and not the rotator cuff tendon which could potentially cause further tendon damage.  Injection performed long axis to the probe.  Injection solution visualized within the joint space.  Images were permanently stored for the patient's record.  There were no complications. The patient tolerated the procedure well. There was negligible bleeding.   The patient was instructed to ice the shoulder upon leaving clinic and refrain from overuse over the next 3 days.   The patient was instructed to call or go to the emergency room with any unusual pain, swelling, redness, or if otherwise concerned.  Nba Ag DO [facility]  Primary Care Sports Medicine  [facility]

## 2020-12-11 NOTE — PROGRESS NOTES
ANTICOAGULATION FOLLOW-UP CLINIC VISIT    Patient Name:  Jim Willingham  Date:  2020  Contact Type:  Telephone    SUBJECTIVE:  Patient Findings     Comments:  Pt reports he's going to keep his vitamin K intake more consistent and that last time he over indulged. Writer let patient know that this is ok to do since this is a healthy.         Clinical Outcomes     Negatives:  Major bleeding event, Thromboembolic event, Anticoagulation-related hospital admission, Anticoagulation-related ED visit, Anticoagulation-related fatality    Comments:  Pt reports he's going to keep his vitamin K intake more consistent and that last time he over indulged. Writer let patient know that this is ok to do since this is a healthy.            OBJECTIVE    Recent labs: (last 7 days)     20  0901   INR 1.99*       ASSESSMENT / PLAN  INR assessment THER    Recheck INR In: 1 WEEK    INR Location Clinic      Anticoagulation Summary  As of 2020    INR goal:  2.0-3.0   TTR:  59.8 % (10.9 mo)   INR used for dosin.99 (2020)   Warfarin maintenance plan:  7.5 mg (5 mg x 1.5) every Sun, Wed, Fri; 5 mg (5 mg x 1) all other days   Full warfarin instructions:  7.5 mg every Sun, Wed, Fri; 5 mg all other days   Weekly warfarin total:  42.5 mg   No change documented:  Radha Pedro, RN   Plan last modified:  Maru Alonso RN (2020)   Next INR check:  2020   Priority:  Critical   Target end date:  Indefinite    Indications    LVAD (left ventricular assist device) present (H) [Z95.811]  Long-term (current) use of anticoagulants [Z79.01] [Z79.01]  Chronic systolic congestive heart failure (H) [I50.22]             Anticoagulation Episode Summary     INR check location:      Preferred lab:      Send INR reminders to:  [facility]    Comments:  +++Patient drawn at Home care visit Q Mon. (8/10/20)+++  Labs drawn either at [facility] or [facility]  LVAD placed 17   II  ASA 81 mg daily       Anticoagulation Care Providers     Provider Role Specialty Phone number    Delisa Montgomery MD Referring Advanced Heart Failure and Transplant Cardiology [phone]            See the Encounter Report to view Anticoagulation Flowsheet and Dosing Calendar (Go to Encounters tab in chart review, and find the Anticoagulation Therapy Visit)    Spoke with patient. Gave them their lab results and new warfarin recommendation.  No changes in health, medication, or diet. No missed doses, no falls. No signs or symptoms of bleed or clotting.     Patient had LVAD placed on:   6/19/17  Type of LVAD: HM 2  Patient's current Aspirin dose: ASA 81mg Daily  LVAD Protocol followed: Yes   If Not Followed Explanation:  N/A    Radha Pedro RN

## 2020-12-11 NOTE — PROGRESS NOTES
Spoke with Dr. Seth following pt's virtual appt. MD gave instructions to decrease Bumex to 3mg po BID, and change Kcl to 30mEq in the morning and 20mEq in the afternoon. Pt should get a bmp on Monday and try to increase po fluids.   Called pt to review instructions. Pt verbalized understanding. Order placed for labs and med list updated.

## 2020-12-14 ENCOUNTER — ANTICOAGULATION THERAPY VISIT (OUTPATIENT)
Dept: ANTICOAGULATION | Facility: CLINIC | Age: 65
End: 2020-12-14

## 2020-12-14 ENCOUNTER — DOCUMENTATION ONLY (OUTPATIENT)
Dept: TRANSPLANT | Facility: CLINIC | Age: 65
End: 2020-12-14

## 2020-12-14 DIAGNOSIS — Z95.811 LVAD (LEFT VENTRICULAR ASSIST DEVICE) PRESENT (H): ICD-10-CM

## 2020-12-14 DIAGNOSIS — I50.22 CHRONIC SYSTOLIC CONGESTIVE HEART FAILURE (H): ICD-10-CM

## 2020-12-14 DIAGNOSIS — Z79.01 LONG TERM CURRENT USE OF ANTICOAGULANT THERAPY: ICD-10-CM

## 2020-12-14 LAB
ANION GAP SERPL CALCULATED.3IONS-SCNC: 6 MMOL/L (ref 3–14)
BUN SERPL-MCNC: 55 MG/DL (ref 7–30)
CALCIUM SERPL-MCNC: 8.6 MG/DL (ref 8.5–10.1)
CHLORIDE SERPL-SCNC: 101 MMOL/L (ref 94–109)
CO2 SERPL-SCNC: 30 MMOL/L (ref 20–32)
CREAT SERPL-MCNC: 1.94 MG/DL (ref 0.66–1.25)
GFR SERPL CREATININE-BSD FRML MDRD: 36 ML/MIN/{1.73_M2}
GLUCOSE SERPL-MCNC: 192 MG/DL (ref 70–99)
INR PPP: 2.39 (ref 0.86–1.14)
POTASSIUM SERPL-SCNC: 3.7 MMOL/L (ref 3.4–5.3)
SODIUM SERPL-SCNC: 137 MMOL/L (ref 133–144)

## 2020-12-14 PROCEDURE — 36415 COLL VENOUS BLD VENIPUNCTURE: CPT | Performed by: INTERNAL MEDICINE

## 2020-12-14 PROCEDURE — 85610 PROTHROMBIN TIME: CPT | Performed by: INTERNAL MEDICINE

## 2020-12-14 PROCEDURE — 80048 BASIC METABOLIC PNL TOTAL CA: CPT | Performed by: INTERNAL MEDICINE

## 2020-12-14 NOTE — PROGRESS NOTES
Performing chart review to assess ongoing weight status. Last weight in chart 245 lbs (11/30), which would equate to a BMI>35. However, pt recently with fluid gains, now down to 223 lbs (after diuresis) per Dr Petersen' note on 12/11. At current weight, BMI remains <35, which is within criteria for heart transplant.     Will remain available for support prn

## 2020-12-14 NOTE — PROGRESS NOTES
ANTICOAGULATION FOLLOW-UP CLINIC VISIT    Patient Name:  Jim Willingham  Date:  2020  Contact Type:  Telephone    SUBJECTIVE:  Patient Findings     Comments:  Spoke with Jim-reports no changes to health, diet or medications. Advised patient to continue current dosing. Patient reports taking 7.5mg on  instead of Sundays-Anticoagulation calendar updated. Patient will recheck INR on Friday with follow up visit.        Clinical Outcomes     Negatives:  Major bleeding event, Thromboembolic event, Anticoagulation-related hospital admission, Anticoagulation-related ED visit, Anticoagulation-related fatality    Comments:  Spoke with Jim-cristin no changes to health, diet or medications. Advised patient to continue current dosing. Patient reports taking 7.5mg on  instead of Sundays-Anticoagulation calendar updated. Patient will recheck INR on Friday with follow up visit.           OBJECTIVE    Recent labs: (last 7 days)     20  0851   INR 2.39*       ASSESSMENT / PLAN  No question data found.  Anticoagulation Summary  As of 2020    INR goal:  2.0-3.0   TTR:  60.6 % (10.9 mo)   INR used for dosin.39 (2020)   Warfarin maintenance plan:  7.5 mg (5 mg x 1.5) every Mon, Wed, Fri; 5 mg (5 mg x 1) all other days   Full warfarin instructions:  7.5 mg every Mon, Wed, Fri; 5 mg all other days   Weekly warfarin total:  42.5 mg   Plan last modified:  Maricarmen Potter RN (2020)   Next INR check:  2020   Priority:  Critical   Target end date:  Indefinite    Indications    LVAD (left ventricular assist device) present (H) [Z95.811]  Long-term (current) use of anticoagulants [Z79.01] [Z79.01]  Chronic systolic congestive heart failure (H) [I50.22]             Anticoagulation Episode Summary     INR check location:      Preferred lab:      Send INR reminders to:  [facility]    Comments:  +++Patient drawn at Home care visit Q Mon. (8/10/20)+++  Labs drawn either at [place]  [facility] or [facility]  LVAD placed 6/19/17  HM II  ASA 81 mg daily      Anticoagulation Care Providers     Provider Role Specialty Phone number    Delisa Montgomery MD Referring Advanced Heart Failure and Transplant Cardiology [phone]            See the Encounter Report to view Anticoagulation Flowsheet and Dosing Calendar (Go to Encounters tab in chart review, and find the Anticoagulation Therapy Visit)    Patient had LVAD placed on:   6/19/17  Type of LVAD: HM2  Patient's current Aspirin dose: 81mg  LVAD Protocol followed: yes   If Not Followed Explanation:  NA    Spoke with patient. Gave them their lab results and new warfarin recommendation.  No changes in health, medication, or diet. No missed doses, no falls. No signs or symptoms of bleed or clotting.       TARYN MAN RN

## 2020-12-15 ENCOUNTER — CARE COORDINATION (OUTPATIENT)
Dept: CARDIOLOGY | Facility: CLINIC | Age: 65
End: 2020-12-15

## 2020-12-15 NOTE — PROGRESS NOTES
Called pt to check in. Pt had labs drawn yesterday to follow-upon bumex dosing changes made on 12/11. K is 3.7 (unchanged) and creat 1.94 (from 2.16). Weight today is 232lb (from 224lb on 12/11). Pt reports he feels worn out. No notable SOB. Pt reports he has only been taking kcl 20mEq in the am and 10mEq in the afternoon, instead of 30 in the am and 20 in the afternoon as discussed on Friday.     Reviewed findings with Dr. Montgomery. MD gave orders to increase Kcl to 30mEq BID. He will be seen in clinic on 12/18 with labs prior.     Called pt to review changes. Pt verbalized understanding.

## 2020-12-16 LAB
MDC_IDC_EPISODE_DTM: NORMAL
MDC_IDC_EPISODE_DURATION: 2 S
MDC_IDC_EPISODE_DURATION: 210 S
MDC_IDC_EPISODE_DURATION: 210 S
MDC_IDC_EPISODE_DURATION: 257 S
MDC_IDC_EPISODE_DURATION: 453 S
MDC_IDC_EPISODE_ID: 721
MDC_IDC_EPISODE_ID: 722
MDC_IDC_EPISODE_ID: 723
MDC_IDC_EPISODE_ID: 724
MDC_IDC_EPISODE_ID: 725
MDC_IDC_EPISODE_ID: NORMAL
MDC_IDC_EPISODE_TYPE: NORMAL
MDC_IDC_LEAD_IMPLANT_DT: NORMAL
MDC_IDC_LEAD_LOCATION: NORMAL
MDC_IDC_LEAD_LOCATION_DETAIL_1: NORMAL
MDC_IDC_LEAD_MFG: NORMAL
MDC_IDC_LEAD_MODEL: NORMAL
MDC_IDC_LEAD_POLARITY_TYPE: NORMAL
MDC_IDC_LEAD_SERIAL: NORMAL
MDC_IDC_MSMT_BATTERY_DTM: NORMAL
MDC_IDC_MSMT_BATTERY_REMAINING_LONGEVITY: 13 MO
MDC_IDC_MSMT_BATTERY_RRT_TRIGGER: 2.73
MDC_IDC_MSMT_BATTERY_STATUS: NORMAL
MDC_IDC_MSMT_BATTERY_VOLTAGE: 2.73 V
MDC_IDC_MSMT_CAP_CHARGE_DTM: NORMAL
MDC_IDC_MSMT_CAP_CHARGE_DTM: NORMAL
MDC_IDC_MSMT_CAP_CHARGE_ENERGY: 18 J
MDC_IDC_MSMT_CAP_CHARGE_ENERGY: 35 J
MDC_IDC_MSMT_CAP_CHARGE_TIME: 11.24 S
MDC_IDC_MSMT_CAP_CHARGE_TIME: 4.74
MDC_IDC_MSMT_CAP_CHARGE_TYPE: NORMAL
MDC_IDC_MSMT_CAP_CHARGE_TYPE: NORMAL
MDC_IDC_MSMT_LEADCHNL_LV_IMPEDANCE_VALUE: 399 OHM
MDC_IDC_MSMT_LEADCHNL_LV_IMPEDANCE_VALUE: 4047 OHM
MDC_IDC_MSMT_LEADCHNL_LV_IMPEDANCE_VALUE: 4047 OHM
MDC_IDC_MSMT_LEADCHNL_LV_PACING_THRESHOLD_AMPLITUDE: 0.62 V
MDC_IDC_MSMT_LEADCHNL_LV_PACING_THRESHOLD_PULSEWIDTH: 0.4 MS
MDC_IDC_MSMT_LEADCHNL_RA_IMPEDANCE_VALUE: 494 OHM
MDC_IDC_MSMT_LEADCHNL_RA_PACING_THRESHOLD_AMPLITUDE: 1.25 V
MDC_IDC_MSMT_LEADCHNL_RA_PACING_THRESHOLD_PULSEWIDTH: 0.4 MS
MDC_IDC_MSMT_LEADCHNL_RA_SENSING_INTR_AMPL: 1 MV
MDC_IDC_MSMT_LEADCHNL_RA_SENSING_INTR_AMPL: 1 MV
MDC_IDC_MSMT_LEADCHNL_RV_IMPEDANCE_VALUE: 304 OHM
MDC_IDC_MSMT_LEADCHNL_RV_IMPEDANCE_VALUE: 380 OHM
MDC_IDC_MSMT_LEADCHNL_RV_PACING_THRESHOLD_AMPLITUDE: 1 V
MDC_IDC_MSMT_LEADCHNL_RV_PACING_THRESHOLD_PULSEWIDTH: 0.4 MS
MDC_IDC_MSMT_LEADCHNL_RV_SENSING_INTR_AMPL: 4.38 MV
MDC_IDC_MSMT_LEADCHNL_RV_SENSING_INTR_AMPL: 4.38 MV
MDC_IDC_PG_IMPLANT_DTM: NORMAL
MDC_IDC_PG_MFG: NORMAL
MDC_IDC_PG_MODEL: NORMAL
MDC_IDC_PG_SERIAL: NORMAL
MDC_IDC_PG_TYPE: NORMAL
MDC_IDC_SESS_CLINIC_NAME: NORMAL
MDC_IDC_SESS_DTM: NORMAL
MDC_IDC_SESS_TYPE: NORMAL
MDC_IDC_SET_BRADY_LOWRATE: 50 {BEATS}/MIN
MDC_IDC_SET_BRADY_MAX_SENSOR_RATE: 130 {BEATS}/MIN
MDC_IDC_SET_BRADY_MODE: NORMAL
MDC_IDC_SET_CRT_PACED_CHAMBERS: NORMAL
MDC_IDC_SET_LEADCHNL_LV_PACING_ANODE_ELECTRODE_1: NORMAL
MDC_IDC_SET_LEADCHNL_LV_PACING_ANODE_LOCATION_1: NORMAL
MDC_IDC_SET_LEADCHNL_LV_PACING_CATHODE_ELECTRODE_1: NORMAL
MDC_IDC_SET_LEADCHNL_LV_PACING_CATHODE_LOCATION_1: NORMAL
MDC_IDC_SET_LEADCHNL_LV_PACING_POLARITY: NORMAL
MDC_IDC_SET_LEADCHNL_RA_PACING_AMPLITUDE: 3 V
MDC_IDC_SET_LEADCHNL_RA_PACING_ANODE_ELECTRODE_1: NORMAL
MDC_IDC_SET_LEADCHNL_RA_PACING_ANODE_LOCATION_1: NORMAL
MDC_IDC_SET_LEADCHNL_RA_PACING_CATHODE_ELECTRODE_1: NORMAL
MDC_IDC_SET_LEADCHNL_RA_PACING_CATHODE_LOCATION_1: NORMAL
MDC_IDC_SET_LEADCHNL_RA_PACING_POLARITY: NORMAL
MDC_IDC_SET_LEADCHNL_RA_PACING_PULSEWIDTH: 0.4 MS
MDC_IDC_SET_LEADCHNL_RA_SENSING_ANODE_ELECTRODE_1: NORMAL
MDC_IDC_SET_LEADCHNL_RA_SENSING_ANODE_LOCATION_1: NORMAL
MDC_IDC_SET_LEADCHNL_RA_SENSING_CATHODE_ELECTRODE_1: NORMAL
MDC_IDC_SET_LEADCHNL_RA_SENSING_CATHODE_LOCATION_1: NORMAL
MDC_IDC_SET_LEADCHNL_RA_SENSING_POLARITY: NORMAL
MDC_IDC_SET_LEADCHNL_RA_SENSING_SENSITIVITY: 0.45 MV
MDC_IDC_SET_LEADCHNL_RV_PACING_AMPLITUDE: 2.5 V
MDC_IDC_SET_LEADCHNL_RV_PACING_ANODE_ELECTRODE_1: NORMAL
MDC_IDC_SET_LEADCHNL_RV_PACING_ANODE_LOCATION_1: NORMAL
MDC_IDC_SET_LEADCHNL_RV_PACING_CATHODE_ELECTRODE_1: NORMAL
MDC_IDC_SET_LEADCHNL_RV_PACING_CATHODE_LOCATION_1: NORMAL
MDC_IDC_SET_LEADCHNL_RV_PACING_POLARITY: NORMAL
MDC_IDC_SET_LEADCHNL_RV_PACING_PULSEWIDTH: 0.4 MS
MDC_IDC_SET_LEADCHNL_RV_SENSING_ANODE_ELECTRODE_1: NORMAL
MDC_IDC_SET_LEADCHNL_RV_SENSING_ANODE_LOCATION_1: NORMAL
MDC_IDC_SET_LEADCHNL_RV_SENSING_CATHODE_ELECTRODE_1: NORMAL
MDC_IDC_SET_LEADCHNL_RV_SENSING_CATHODE_LOCATION_1: NORMAL
MDC_IDC_SET_LEADCHNL_RV_SENSING_POLARITY: NORMAL
MDC_IDC_SET_LEADCHNL_RV_SENSING_SENSITIVITY: 0.3 MV
MDC_IDC_SET_ZONE_DETECTION_BEATS_DENOMINATOR: 40 {BEATS}
MDC_IDC_SET_ZONE_DETECTION_BEATS_NUMERATOR: 30 {BEATS}
MDC_IDC_SET_ZONE_DETECTION_INTERVAL: 240 MS
MDC_IDC_SET_ZONE_DETECTION_INTERVAL: 270 MS
MDC_IDC_SET_ZONE_DETECTION_INTERVAL: 360 MS
MDC_IDC_SET_ZONE_DETECTION_INTERVAL: 400 MS
MDC_IDC_SET_ZONE_DETECTION_INTERVAL: 430 MS
MDC_IDC_SET_ZONE_TYPE: NORMAL
MDC_IDC_STAT_AT_BURDEN_PERCENT: 0 %
MDC_IDC_STAT_AT_DTM_END: NORMAL
MDC_IDC_STAT_AT_DTM_START: NORMAL
MDC_IDC_STAT_BRADY_AP_VP_PERCENT: 0 %
MDC_IDC_STAT_BRADY_AP_VS_PERCENT: 0.3 %
MDC_IDC_STAT_BRADY_AS_VP_PERCENT: 0 %
MDC_IDC_STAT_BRADY_AS_VS_PERCENT: 99.7 %
MDC_IDC_STAT_BRADY_DTM_END: NORMAL
MDC_IDC_STAT_BRADY_DTM_START: NORMAL
MDC_IDC_STAT_BRADY_RA_PERCENT_PACED: 0.48 %
MDC_IDC_STAT_BRADY_RV_PERCENT_PACED: 0 %
MDC_IDC_STAT_CRT_DTM_END: NORMAL
MDC_IDC_STAT_CRT_DTM_START: NORMAL
MDC_IDC_STAT_CRT_LV_PERCENT_PACED: 0 %
MDC_IDC_STAT_CRT_PERCENT_PACED: 0 %
MDC_IDC_STAT_EPISODE_RECENT_COUNT: 0
MDC_IDC_STAT_EPISODE_RECENT_COUNT: 1
MDC_IDC_STAT_EPISODE_RECENT_COUNT: 3
MDC_IDC_STAT_EPISODE_RECENT_COUNT_DTM_END: NORMAL
MDC_IDC_STAT_EPISODE_RECENT_COUNT_DTM_START: NORMAL
MDC_IDC_STAT_EPISODE_TOTAL_COUNT: 0
MDC_IDC_STAT_EPISODE_TOTAL_COUNT: 0
MDC_IDC_STAT_EPISODE_TOTAL_COUNT: 189
MDC_IDC_STAT_EPISODE_TOTAL_COUNT: 2
MDC_IDC_STAT_EPISODE_TOTAL_COUNT: 20
MDC_IDC_STAT_EPISODE_TOTAL_COUNT: 22
MDC_IDC_STAT_EPISODE_TOTAL_COUNT: 292
MDC_IDC_STAT_EPISODE_TOTAL_COUNT_DTM_END: NORMAL
MDC_IDC_STAT_EPISODE_TOTAL_COUNT_DTM_START: NORMAL
MDC_IDC_STAT_EPISODE_TYPE: NORMAL
MDC_IDC_STAT_TACHYTHERAPY_ATP_DELIVERED_RECENT: 0
MDC_IDC_STAT_TACHYTHERAPY_ATP_DELIVERED_TOTAL: 14
MDC_IDC_STAT_TACHYTHERAPY_RECENT_DTM_END: NORMAL
MDC_IDC_STAT_TACHYTHERAPY_RECENT_DTM_START: NORMAL
MDC_IDC_STAT_TACHYTHERAPY_SHOCKS_ABORTED_RECENT: 0
MDC_IDC_STAT_TACHYTHERAPY_SHOCKS_ABORTED_TOTAL: 3
MDC_IDC_STAT_TACHYTHERAPY_SHOCKS_DELIVERED_RECENT: 1
MDC_IDC_STAT_TACHYTHERAPY_SHOCKS_DELIVERED_TOTAL: 5
MDC_IDC_STAT_TACHYTHERAPY_TOTAL_DTM_END: NORMAL
MDC_IDC_STAT_TACHYTHERAPY_TOTAL_DTM_START: NORMAL

## 2020-12-17 ASSESSMENT — ENCOUNTER SYMPTOMS
DECREASED CONCENTRATION: 0
WEIGHT LOSS: 1
MUSCLE WEAKNESS: 0
WEAKNESS: 1
MYALGIAS: 1
EYE IRRITATION: 1
DEPRESSION: 1
SLEEP DISTURBANCES DUE TO BREATHING: 0
SYNCOPE: 0
WHEEZING: 0
DIZZINESS: 1
NAIL CHANGES: 0
FEVER: 0
HEMOPTYSIS: 0
EYE WATERING: 0
FATIGUE: 1
POOR WOUND HEALING: 0
COUGH: 0
PARALYSIS: 0
COUGH DISTURBING SLEEP: 0
PALPITATIONS: 0
DOUBLE VISION: 0
NECK PAIN: 0
NERVOUS/ANXIOUS: 0
DYSPNEA ON EXERTION: 1
EXERCISE INTOLERANCE: 1
MEMORY LOSS: 1
PANIC: 0
DISTURBANCES IN COORDINATION: 1
SPUTUM PRODUCTION: 0
MUSCLE CRAMPS: 0
CHILLS: 0
POLYPHAGIA: 0
HALLUCINATIONS: 0
NUMBNESS: 1
SNORES LOUDLY: 0
JOINT SWELLING: 0
SHORTNESS OF BREATH: 0
SEIZURES: 0
HYPOTENSION: 0
EYE REDNESS: 0
ORTHOPNEA: 0
POLYDIPSIA: 0
HEADACHES: 1
EYE PAIN: 0
SPEECH CHANGE: 0
DECREASED APPETITE: 0
WEIGHT GAIN: 1
NIGHT SWEATS: 0
ALTERED TEMPERATURE REGULATION: 1
LIGHT-HEADEDNESS: 0
POSTURAL DYSPNEA: 0
STIFFNESS: 1
INSOMNIA: 1
INCREASED ENERGY: 1
HYPERTENSION: 0
ARTHRALGIAS: 1
LOSS OF CONSCIOUSNESS: 0
TINGLING: 1
SKIN CHANGES: 0
BACK PAIN: 1

## 2020-12-18 ENCOUNTER — OFFICE VISIT (OUTPATIENT)
Dept: CARDIOLOGY | Facility: CLINIC | Age: 65
End: 2020-12-18
Attending: INTERNAL MEDICINE
Payer: COMMERCIAL

## 2020-12-18 ENCOUNTER — ANTICOAGULATION THERAPY VISIT (OUTPATIENT)
Dept: ANTICOAGULATION | Facility: CLINIC | Age: 65
End: 2020-12-18

## 2020-12-18 VITALS
BODY MASS INDEX: 36.83 KG/M2 | OXYGEN SATURATION: 97 % | SYSTOLIC BLOOD PRESSURE: 90 MMHG | HEIGHT: 68 IN | HEART RATE: 80 BPM | WEIGHT: 243 LBS

## 2020-12-18 DIAGNOSIS — Z95.810 ICD (IMPLANTABLE CARDIOVERTER-DEFIBRILLATOR), BIVENTRICULAR, IN SITU: ICD-10-CM

## 2020-12-18 DIAGNOSIS — Z79.01 LONG TERM CURRENT USE OF ANTICOAGULANT THERAPY: ICD-10-CM

## 2020-12-18 DIAGNOSIS — I50.22 CHRONIC SYSTOLIC CONGESTIVE HEART FAILURE (H): ICD-10-CM

## 2020-12-18 DIAGNOSIS — I42.9 CARDIOMYOPATHY (H): ICD-10-CM

## 2020-12-18 DIAGNOSIS — Z95.811 LVAD (LEFT VENTRICULAR ASSIST DEVICE) PRESENT (H): ICD-10-CM

## 2020-12-18 DIAGNOSIS — I47.29 PAROXYSMAL VT (H): ICD-10-CM

## 2020-12-18 DIAGNOSIS — I48.0 PAROXYSMAL ATRIAL FIBRILLATION (H): ICD-10-CM

## 2020-12-18 DIAGNOSIS — I50.22 CHRONIC SYSTOLIC CONGESTIVE HEART FAILURE (H): Primary | ICD-10-CM

## 2020-12-18 LAB
ALBUMIN SERPL-MCNC: 3.9 G/DL (ref 3.4–5)
ALP SERPL-CCNC: 129 U/L (ref 40–150)
ALT SERPL W P-5'-P-CCNC: 53 U/L (ref 0–70)
ANION GAP SERPL CALCULATED.3IONS-SCNC: 8 MMOL/L (ref 3–14)
AST SERPL W P-5'-P-CCNC: 45 U/L (ref 0–45)
BILIRUB SERPL-MCNC: 0.7 MG/DL (ref 0.2–1.3)
BUN SERPL-MCNC: 54 MG/DL (ref 7–30)
CALCIUM SERPL-MCNC: 8.6 MG/DL (ref 8.5–10.1)
CHLORIDE SERPL-SCNC: 100 MMOL/L (ref 94–109)
CO2 SERPL-SCNC: 27 MMOL/L (ref 20–32)
CREAT SERPL-MCNC: 1.84 MG/DL (ref 0.66–1.25)
ERYTHROCYTE [DISTWIDTH] IN BLOOD BY AUTOMATED COUNT: 18.7 % (ref 10–15)
GFR SERPL CREATININE-BSD FRML MDRD: 38 ML/MIN/{1.73_M2}
GLUCOSE SERPL-MCNC: 151 MG/DL (ref 70–99)
HCT VFR BLD AUTO: 34.6 % (ref 40–53)
HGB BLD-MCNC: 10.1 G/DL (ref 13.3–17.7)
INR PPP: 1.99 (ref 0.86–1.14)
LDH SERPL L TO P-CCNC: 387 U/L (ref 85–227)
MCH RBC QN AUTO: 25.2 PG (ref 26.5–33)
MCHC RBC AUTO-ENTMCNC: 29.2 G/DL (ref 31.5–36.5)
MCV RBC AUTO: 86 FL (ref 78–100)
MDC_IDC_LEAD_IMPLANT_DT: NORMAL
MDC_IDC_LEAD_LOCATION: NORMAL
MDC_IDC_LEAD_LOCATION_DETAIL_1: NORMAL
MDC_IDC_LEAD_MFG: NORMAL
MDC_IDC_LEAD_MODEL: NORMAL
MDC_IDC_LEAD_POLARITY_TYPE: NORMAL
MDC_IDC_LEAD_SERIAL: NORMAL
MDC_IDC_MSMT_BATTERY_DTM: NORMAL
MDC_IDC_MSMT_BATTERY_REMAINING_LONGEVITY: 9 MO
MDC_IDC_MSMT_BATTERY_RRT_TRIGGER: 2.73
MDC_IDC_MSMT_BATTERY_STATUS: NORMAL
MDC_IDC_MSMT_BATTERY_VOLTAGE: 2.82 V
MDC_IDC_MSMT_CAP_CHARGE_DTM: NORMAL
MDC_IDC_MSMT_CAP_CHARGE_DTM: NORMAL
MDC_IDC_MSMT_CAP_CHARGE_ENERGY: 18 J
MDC_IDC_MSMT_CAP_CHARGE_ENERGY: 35 J
MDC_IDC_MSMT_CAP_CHARGE_TIME: 11.24 S
MDC_IDC_MSMT_CAP_CHARGE_TIME: 4.74
MDC_IDC_MSMT_CAP_CHARGE_TYPE: NORMAL
MDC_IDC_MSMT_CAP_CHARGE_TYPE: NORMAL
MDC_IDC_MSMT_LEADCHNL_LV_IMPEDANCE_VALUE: 380 OHM
MDC_IDC_MSMT_LEADCHNL_LV_IMPEDANCE_VALUE: 4047 OHM
MDC_IDC_MSMT_LEADCHNL_LV_IMPEDANCE_VALUE: 4047 OHM
MDC_IDC_MSMT_LEADCHNL_LV_PACING_THRESHOLD_AMPLITUDE: 0.62 V
MDC_IDC_MSMT_LEADCHNL_LV_PACING_THRESHOLD_PULSEWIDTH: 0.4 MS
MDC_IDC_MSMT_LEADCHNL_RA_IMPEDANCE_VALUE: 494 OHM
MDC_IDC_MSMT_LEADCHNL_RA_PACING_THRESHOLD_AMPLITUDE: 1.25 V
MDC_IDC_MSMT_LEADCHNL_RA_PACING_THRESHOLD_AMPLITUDE: 1.75 V
MDC_IDC_MSMT_LEADCHNL_RA_PACING_THRESHOLD_PULSEWIDTH: 0.4 MS
MDC_IDC_MSMT_LEADCHNL_RA_PACING_THRESHOLD_PULSEWIDTH: 0.4 MS
MDC_IDC_MSMT_LEADCHNL_RA_SENSING_INTR_AMPL: 1.25 MV
MDC_IDC_MSMT_LEADCHNL_RA_SENSING_INTR_AMPL: 1.38 MV
MDC_IDC_MSMT_LEADCHNL_RV_IMPEDANCE_VALUE: 266 OHM
MDC_IDC_MSMT_LEADCHNL_RV_IMPEDANCE_VALUE: 399 OHM
MDC_IDC_MSMT_LEADCHNL_RV_PACING_THRESHOLD_AMPLITUDE: 1 V
MDC_IDC_MSMT_LEADCHNL_RV_PACING_THRESHOLD_AMPLITUDE: 1.25 V
MDC_IDC_MSMT_LEADCHNL_RV_PACING_THRESHOLD_PULSEWIDTH: 0.4 MS
MDC_IDC_MSMT_LEADCHNL_RV_PACING_THRESHOLD_PULSEWIDTH: 0.4 MS
MDC_IDC_MSMT_LEADCHNL_RV_SENSING_INTR_AMPL: 4.5 MV
MDC_IDC_MSMT_LEADCHNL_RV_SENSING_INTR_AMPL: 4.5 MV
MDC_IDC_PG_IMPLANT_DTM: NORMAL
MDC_IDC_PG_MFG: NORMAL
MDC_IDC_PG_MODEL: NORMAL
MDC_IDC_PG_SERIAL: NORMAL
MDC_IDC_PG_TYPE: NORMAL
MDC_IDC_SESS_CLINIC_NAME: NORMAL
MDC_IDC_SESS_DTM: NORMAL
MDC_IDC_SESS_TYPE: NORMAL
MDC_IDC_SET_BRADY_LOWRATE: 50 {BEATS}/MIN
MDC_IDC_SET_BRADY_MAX_SENSOR_RATE: 130 {BEATS}/MIN
MDC_IDC_SET_BRADY_MODE: NORMAL
MDC_IDC_SET_CRT_PACED_CHAMBERS: NORMAL
MDC_IDC_SET_LEADCHNL_LV_PACING_ANODE_ELECTRODE_1: NORMAL
MDC_IDC_SET_LEADCHNL_LV_PACING_ANODE_LOCATION_1: NORMAL
MDC_IDC_SET_LEADCHNL_LV_PACING_CATHODE_ELECTRODE_1: NORMAL
MDC_IDC_SET_LEADCHNL_LV_PACING_CATHODE_LOCATION_1: NORMAL
MDC_IDC_SET_LEADCHNL_LV_PACING_POLARITY: NORMAL
MDC_IDC_SET_LEADCHNL_RA_PACING_AMPLITUDE: 3 V
MDC_IDC_SET_LEADCHNL_RA_PACING_ANODE_ELECTRODE_1: NORMAL
MDC_IDC_SET_LEADCHNL_RA_PACING_ANODE_LOCATION_1: NORMAL
MDC_IDC_SET_LEADCHNL_RA_PACING_CATHODE_ELECTRODE_1: NORMAL
MDC_IDC_SET_LEADCHNL_RA_PACING_CATHODE_LOCATION_1: NORMAL
MDC_IDC_SET_LEADCHNL_RA_PACING_POLARITY: NORMAL
MDC_IDC_SET_LEADCHNL_RA_PACING_PULSEWIDTH: 0.4 MS
MDC_IDC_SET_LEADCHNL_RA_SENSING_ANODE_ELECTRODE_1: NORMAL
MDC_IDC_SET_LEADCHNL_RA_SENSING_ANODE_LOCATION_1: NORMAL
MDC_IDC_SET_LEADCHNL_RA_SENSING_CATHODE_ELECTRODE_1: NORMAL
MDC_IDC_SET_LEADCHNL_RA_SENSING_CATHODE_LOCATION_1: NORMAL
MDC_IDC_SET_LEADCHNL_RA_SENSING_POLARITY: NORMAL
MDC_IDC_SET_LEADCHNL_RA_SENSING_SENSITIVITY: 0.45 MV
MDC_IDC_SET_LEADCHNL_RV_PACING_AMPLITUDE: 2.5 V
MDC_IDC_SET_LEADCHNL_RV_PACING_ANODE_ELECTRODE_1: NORMAL
MDC_IDC_SET_LEADCHNL_RV_PACING_ANODE_LOCATION_1: NORMAL
MDC_IDC_SET_LEADCHNL_RV_PACING_CATHODE_ELECTRODE_1: NORMAL
MDC_IDC_SET_LEADCHNL_RV_PACING_CATHODE_LOCATION_1: NORMAL
MDC_IDC_SET_LEADCHNL_RV_PACING_POLARITY: NORMAL
MDC_IDC_SET_LEADCHNL_RV_PACING_PULSEWIDTH: 0.4 MS
MDC_IDC_SET_LEADCHNL_RV_SENSING_ANODE_ELECTRODE_1: NORMAL
MDC_IDC_SET_LEADCHNL_RV_SENSING_ANODE_LOCATION_1: NORMAL
MDC_IDC_SET_LEADCHNL_RV_SENSING_CATHODE_ELECTRODE_1: NORMAL
MDC_IDC_SET_LEADCHNL_RV_SENSING_CATHODE_LOCATION_1: NORMAL
MDC_IDC_SET_LEADCHNL_RV_SENSING_POLARITY: NORMAL
MDC_IDC_SET_LEADCHNL_RV_SENSING_SENSITIVITY: 0.3 MV
MDC_IDC_SET_ZONE_DETECTION_BEATS_DENOMINATOR: 40 {BEATS}
MDC_IDC_SET_ZONE_DETECTION_BEATS_NUMERATOR: 30 {BEATS}
MDC_IDC_SET_ZONE_DETECTION_INTERVAL: 240 MS
MDC_IDC_SET_ZONE_DETECTION_INTERVAL: 270 MS
MDC_IDC_SET_ZONE_DETECTION_INTERVAL: 360 MS
MDC_IDC_SET_ZONE_DETECTION_INTERVAL: 400 MS
MDC_IDC_SET_ZONE_DETECTION_INTERVAL: 430 MS
MDC_IDC_SET_ZONE_TYPE: NORMAL
MDC_IDC_STAT_AT_BURDEN_PERCENT: 0 %
MDC_IDC_STAT_AT_DTM_END: NORMAL
MDC_IDC_STAT_AT_DTM_START: NORMAL
MDC_IDC_STAT_BRADY_AP_VP_PERCENT: 0 %
MDC_IDC_STAT_BRADY_AP_VS_PERCENT: 0.27 %
MDC_IDC_STAT_BRADY_AS_VP_PERCENT: 0 %
MDC_IDC_STAT_BRADY_AS_VS_PERCENT: 99.73 %
MDC_IDC_STAT_BRADY_DTM_END: NORMAL
MDC_IDC_STAT_BRADY_DTM_START: NORMAL
MDC_IDC_STAT_BRADY_RA_PERCENT_PACED: 0.41 %
MDC_IDC_STAT_BRADY_RV_PERCENT_PACED: 0 %
MDC_IDC_STAT_CRT_DTM_END: NORMAL
MDC_IDC_STAT_CRT_DTM_START: NORMAL
MDC_IDC_STAT_CRT_LV_PERCENT_PACED: 0 %
MDC_IDC_STAT_CRT_PERCENT_PACED: 0 %
MDC_IDC_STAT_EPISODE_RECENT_COUNT: 0
MDC_IDC_STAT_EPISODE_RECENT_COUNT: 1
MDC_IDC_STAT_EPISODE_RECENT_COUNT: 3
MDC_IDC_STAT_EPISODE_RECENT_COUNT_DTM_END: NORMAL
MDC_IDC_STAT_EPISODE_RECENT_COUNT_DTM_START: NORMAL
MDC_IDC_STAT_EPISODE_TOTAL_COUNT: 0
MDC_IDC_STAT_EPISODE_TOTAL_COUNT: 0
MDC_IDC_STAT_EPISODE_TOTAL_COUNT: 189
MDC_IDC_STAT_EPISODE_TOTAL_COUNT: 2
MDC_IDC_STAT_EPISODE_TOTAL_COUNT: 20
MDC_IDC_STAT_EPISODE_TOTAL_COUNT: 22
MDC_IDC_STAT_EPISODE_TOTAL_COUNT: 292
MDC_IDC_STAT_EPISODE_TOTAL_COUNT_DTM_END: NORMAL
MDC_IDC_STAT_EPISODE_TOTAL_COUNT_DTM_START: NORMAL
MDC_IDC_STAT_EPISODE_TYPE: NORMAL
MDC_IDC_STAT_TACHYTHERAPY_ATP_DELIVERED_RECENT: 0
MDC_IDC_STAT_TACHYTHERAPY_ATP_DELIVERED_TOTAL: 14
MDC_IDC_STAT_TACHYTHERAPY_RECENT_DTM_END: NORMAL
MDC_IDC_STAT_TACHYTHERAPY_RECENT_DTM_START: NORMAL
MDC_IDC_STAT_TACHYTHERAPY_SHOCKS_ABORTED_RECENT: 0
MDC_IDC_STAT_TACHYTHERAPY_SHOCKS_ABORTED_TOTAL: 3
MDC_IDC_STAT_TACHYTHERAPY_SHOCKS_DELIVERED_RECENT: 1
MDC_IDC_STAT_TACHYTHERAPY_SHOCKS_DELIVERED_TOTAL: 5
MDC_IDC_STAT_TACHYTHERAPY_TOTAL_DTM_END: NORMAL
MDC_IDC_STAT_TACHYTHERAPY_TOTAL_DTM_START: NORMAL
PLATELET # BLD AUTO: 207 10E9/L (ref 150–450)
POTASSIUM SERPL-SCNC: 4.1 MMOL/L (ref 3.4–5.3)
PROT SERPL-MCNC: 7.2 G/DL (ref 6.8–8.8)
RBC # BLD AUTO: 4.01 10E12/L (ref 4.4–5.9)
SODIUM SERPL-SCNC: 135 MMOL/L (ref 133–144)
TSH SERPL DL<=0.005 MIU/L-ACNC: 3.45 MU/L (ref 0.4–4)
WBC # BLD AUTO: 6.3 10E9/L (ref 4–11)

## 2020-12-18 PROCEDURE — 84443 ASSAY THYROID STIM HORMONE: CPT | Performed by: INTERNAL MEDICINE

## 2020-12-18 PROCEDURE — 36415 COLL VENOUS BLD VENIPUNCTURE: CPT | Performed by: PATHOLOGY

## 2020-12-18 PROCEDURE — G0463 HOSPITAL OUTPT CLINIC VISIT: HCPCS | Mod: 25

## 2020-12-18 PROCEDURE — 93750 INTERROGATION VAD IN PERSON: CPT | Performed by: INTERNAL MEDICINE

## 2020-12-18 PROCEDURE — 84443 ASSAY THYROID STIM HORMONE: CPT | Performed by: PATHOLOGY

## 2020-12-18 PROCEDURE — 85027 COMPLETE CBC AUTOMATED: CPT | Performed by: PATHOLOGY

## 2020-12-18 PROCEDURE — 83615 LACTATE (LD) (LDH) ENZYME: CPT | Performed by: PATHOLOGY

## 2020-12-18 PROCEDURE — 85610 PROTHROMBIN TIME: CPT | Performed by: PATHOLOGY

## 2020-12-18 PROCEDURE — 93283 PRGRMG EVAL IMPLANTABLE DFB: CPT | Performed by: INTERNAL MEDICINE

## 2020-12-18 PROCEDURE — 80053 COMPREHEN METABOLIC PANEL: CPT | Performed by: PATHOLOGY

## 2020-12-18 PROCEDURE — 99215 OFFICE O/P EST HI 40 MIN: CPT | Performed by: INTERNAL MEDICINE

## 2020-12-18 RX ORDER — POTASSIUM CHLORIDE 750 MG/1
40 TABLET, EXTENDED RELEASE ORAL 2 TIMES DAILY
Qty: 270 TABLET | Refills: 3 | COMMUNITY
Start: 2020-12-18 | End: 2020-12-18

## 2020-12-18 RX ORDER — WARFARIN SODIUM 5 MG/1
TABLET ORAL
Status: ON HOLD | COMMUNITY
Start: 2020-12-18 | End: 2021-01-11

## 2020-12-18 RX ORDER — POTASSIUM CHLORIDE 750 MG/1
40 TABLET, EXTENDED RELEASE ORAL 2 TIMES DAILY
Qty: 720 TABLET | Refills: 3 | Status: SHIPPED | OUTPATIENT
Start: 2020-12-18 | End: 2020-12-29

## 2020-12-18 RX ORDER — BUMETANIDE 2 MG/1
4 TABLET ORAL 2 TIMES DAILY
Qty: 540 TABLET | Refills: 3 | COMMUNITY
Start: 2020-12-18 | End: 2020-12-30

## 2020-12-18 RX ORDER — HYDRALAZINE HYDROCHLORIDE 50 MG/1
50 TABLET, FILM COATED ORAL 3 TIMES DAILY
Qty: 270 TABLET | Refills: 3 | Status: ON HOLD | OUTPATIENT
Start: 2020-12-18 | End: 2021-01-11

## 2020-12-18 ASSESSMENT — MIFFLIN-ST. JEOR: SCORE: 1871.74

## 2020-12-18 ASSESSMENT — PAIN SCALES - GENERAL: PAINLEVEL: NO PAIN (0)

## 2020-12-18 NOTE — PROGRESS NOTES
December 18, 2020      HPI:   Jim Willingham is a 63 year old male with a past medical history of NICM (EF 20%) s/p HM II LVAD placement (6/19/17) at DT (obesity) /     Staph hominis bacteremia and completed 6 weeks of IV vancomycin he has had no recurrence of symptoms.     More recently he has been fluctuating a lot with his weight.  He does think he has gained some weight since the pandemic started but he has now put fluid into his ankles and belly.     His weight is likely 10 pounds above his dry weight.     He was also had several shocks last week due to ventricular arrhythmias-saw one of my partners and his diuretics were decreased given low potassium.     His weight is up another 5 to 6 pounds from that time.     He feels short of breath with walking even 2 or 3 blocks.  He is less active than he wants to be he is exhausted.  He is also having bilateral carpal tunnel surgery soon..     He presently denies PND orthopnea.     In the last 2 years-  he has lost a substantial amount of weight and this was intentional-as he wants to be considered for transplant.  His transplant evaluation is ongoing. Weight is unfortunately back up again     Other recent issues:      atrial fibrillation starting earlier this year.  He did see electrophysiology as the pacemaker settings were changed and there was some discussion about whether or not he should have a cardioversion or just leave him in atrial fibrillation.  He was eventually cardioverted and is on amiodarone.  He did have symptomatic improvement with this.     He denies fevers, chills, driveline redness, tenderness, or discharge. No LVAD alarms or blood in the urine or stools.    Generally just not feeling as well as he would like.     PAST MEDICAL HISTORY:  Past Medical History:   Diagnosis Date     Benign essential hypertension 5/11/2017     Bilateral carpal tunnel syndrome 11/2/2020     Cardiomyopathy, unspecified (H) 5/8/2017     CKD (chronic kidney disease) stage 3,  GFR 30-59 ml/min 5/11/2017     COPD (chronic obstructive pulmonary disease) (H) 11/2/2020     Depression 5/11/2017     Diabetes mellitus (H) 5/11/2017     Gouty arthropathy, chronic, without tophi 11/2/2020     H/O gastric bypass 5/11/2017     ICD (implantable cardioverter-defibrillator), biventricular, in situ 5/11/2017     LVAD (left ventricular assist device) present (H)      Major depression, recurrent, chronic (H) 11/2/2020     NICM (nonischemic cardiomyopathy) (H)/ EF 20% 5/11/2017    ECHO: LVEDd. 7.66 cm, Restrictive pattern , Severe mitral valve regurgitation     CECILIA (obstructive sleep apnea) 5/11/2017     Paroxysmal atrial fibrillation (H) 5/11/2017     Paroxysmal VT (H) 5/11/2017     Rotator cuff tear arthropathy of both shoulders 11/2/2020     Uncomplicated asthma      Vitamin B12 deficiency (non anemic) 5/11/2017       FAMILY HISTORY:  Family History   Problem Relation Age of Onset     Cerebrovascular Disease Mother 64     Diabetes Mother      Hypertension Mother      Coronary Artery Disease Father      Diabetes Type 2  Father      Obesity Brother      Obesity Brother      Cerebrovascular Disease Daughter 40     SOCIAL HISTORY:  No changes     CURRENT MEDICATIONS:  Current Outpatient Medications   Medication Sig Dispense Refill     acetaminophen (TYLENOL) 325 MG tablet Take 650 mg by mouth every 6 hours as needed for mild pain       albuterol (PROAIR HFA) 108 (90 Base) MCG/ACT inhaler Inhale 2 puffs into the lungs every 4 hours as needed for shortness of breath / dyspnea or wheezing        allopurinol (ZYLOPRIM) 300 MG tablet Take 1 tablet (300 mg) by mouth daily 90 tablet 1     amiodarone (PACERONE) 200 MG tablet Take 400 mg by mouth daily       aspirin 81 MG chewable tablet 1 tablet (81 mg) by Oral or Feeding Tube route daily 36 tablet      blood glucose monitoring (ONE TOUCH ULTRA 2) meter device kit Use to test blood sugar four times daily or as directed. 1 kit 0     blood glucose VI test strip Use  to test blood sugar four times daily or as directed. 200 each 0     bumetanide (BUMEX) 2 MG tablet Take 3mg (1.5 tabs) twice a day 540 tablet 3     buPROPion (WELLBUTRIN XL) 150 MG 24 hr tablet Take 1 tablet (150 mg) by mouth every morning 60 tablet 1     cyanocobalamin (VITAMIN B12) 1000 MCG/ML injection Inject 1,000 mcg into the muscle every 30 days       fluticasone-vilanterol (BREO ELLIPTA) 200-25 MCG/INH oral inhaler Inhale 1 puff into the lungs daily       gabapentin (NEURONTIN) 300 MG capsule Take 2 capsules (600 mg) by mouth At Bedtime (Patient taking differently: Take 300 mg by mouth At Bedtime 300mg in am, 300mg in the afternoon, 600mg at bedtime.) 30 capsule 3     hydrALAZINE (APRESOLINE) 10 MG tablet Take 3 tablets (30 mg) by mouth 3 times daily 540 tablet 5     insulin glargine (LANTUS SOLOSTAR) 100 UNIT/ML pen Inject 6 Units Subcutaneous At Bedtime 9 mL 3     insulin lispro (HUMALOG KWIKPEN) 100 UNIT/ML (1 unit dial) KWIKPEN Inject 1-7 Units Subcutaneous 4 times daily 3 mL 1     insulin pen needle (32G X 4 MM) 32G X 4 MM miscellaneous Use four pen needles daily or as directed. 110 each 0     metolazone (ZAROXOLYN) 2.5 MG tablet Take 1 tablet (2.5 mg) by mouth as needed (take ONLY as directed by VAD team) 5 tablet 0     montelukast (SINGULAIR) 10 MG tablet Take 10 mg by mouth At Bedtime        pantoprazole (PROTONIX) 40 MG EC tablet Take 1 tablet (40 mg) by mouth every morning 60 tablet 5     potassium chloride ER (KLOR-CON M) 10 MEQ CR tablet Take 30mEq in the morning and 20mEq in the afternoon. 270 tablet 3     predniSONE (DELTASONE) 5 MG tablet Take 1 tablet (5 mg) by mouth daily 30 tablet 2     traMADol (ULTRAM) 50 MG tablet Take 2 tablets (100 mg) by mouth every 6 hours as needed for moderate pain or breakthrough pain 28 tablet      umeclidinium (INCRUSE ELLIPTA) 62.5 MCG/INH oral inhaler Inhale 1 puff into the lungs daily       warfarin ANTICOAGULANT (COUMADIN) 5 MG tablet Take 1 tablet (5 mg) by  "mouth daily (Patient taking differently: Take 5 mg by mouth daily 7.5 Sunday and thursday) 30 tablet 3     zinc sulfate (ZINCATE) 220 (50 Zn) MG capsule Take 220 mg by mouth 2 times daily       Review of systems: 12 point review of systems negative unless noted in the HPI-     EXAM:  BP (!) 90/0 (BP Location: Right arm, Patient Position: Chair, Cuff Size: Adult Regular)   Pulse 80   Ht 1.727 m (5' 8\")   Wt 110.2 kg (243 lb)   SpO2 97%   BMI 36.95 kg/m    General: appears comfortable, alert and articulate, in a wheelchair , not on oxygen   Head: normocephalic, atraumatic  Eyes: anicteric sclera, EOMI  Neck: no adenopathy-JVP is to the angle of the jaw  Orophyarynx: moist mucosa, no lesions, dentition intact  Heart:LVAD hum, PMI diffuse   Lungs: clear, no rales or wheezing  Abdomen: soft, non-tender, bowel sounds present, no hepatosplenomegaly positive abdominal distention  Extremities: no clubbing, cyanosis 2+ lower extremity edema  Neurological: normal speech and affect, no gross motor deficits       Interpretation Summary  HM2 at 9400RPM.  Severely (EF <30%) reduced left ventricular function is present. Mild left  ventricular dilation is present. LVIDd 61mm.  Septum midline.  Aortic valve remain closed with no AI. Normal inflow velocities (62 cm/s).  Outflow velocity cannot be assessed.  Global right ventricular function is mildly to moderately reduced.  Dilation of the inferior vena cava is present with abnormal respiratory  variation in diameter suggests a high RA pressure estimated at 15 mmHg or  greater.  Mild to moderate mitral insufficiency is present.     This study was compared with the study from 8/5/2020 .  IVC is dilated now with higher RA pressure. Otherwise no significant change      RA Pressures  3:41 PM   6    7    7      107      RV Pressures  3:42 PM 24        6     153      PA Pressures  3:43 PM 23    11    15        156      PCW Pressures  3:42 PM   4    4    5      153      Blood Flow " Results Phase: Baseline     Time Results  Indexed Values (L/min/m2)   QP  3:32 PM 4.05 L/min    1.84      QS  3:32 PM 4.05 L/min    1.84      Blood Oximetry Phase: Baseline     Time Hb  SAT(%)  PO2  Content (mL/dL) PA Sat (%)   PA  3:32 PM  58.3 %      58.3      Art  3:32 PM  99 %     17.64           LVAD interrogation today: no PI events, no speed drops, no alarms, agree with coordinator note     PA due to reports of continued mild/moderate SOB with mild pitting edema of BLE. Patient has a [employer]onic Biventricular ICD programmed AAIR. Normal ICD function. No episodes/arrhythmias recorded. Presenting EGM = SR @ 85 bpm. AP = 0.5%.  = 0%. OptiVol fluid index is above the threshold >200 since mid August. Estimated battery longevity to MARVA = 10 months. No short v-v intervals recorded. Lead trends appear stable. Patient notified of interrogation results by voicemail.      RECOMMENDATIONS:  - Will complete a 6 week course of IV vancomycin (stop date 10/30).  - Will repeat blood cx x2 ~5-7 days after stopping abx.  PICC can be pulled at that time.   - If he has recurrent CoNS bacteremia, would then plan on lifelong suppression with oral antibiotics.          Assessment and Plan:    In summary Jim Willingham has a history of NICM (EF 20%) s/p HM II LVAD as DT (initially DT due to obesity).      He has been struggling recently and I am trying to figure out exactly why.     Every time we have done a right heart catheterization on him is been when he is very dry-which is not very informative.  I am not totally sure whether we have an adequate LVAD speed or whether this is an RV issue based on what I am seeing.     I am increasing his diuretics today but the next time he is decompensated - c I would vote that we do a right heart cath that moment to try to capture whether or not we need more speed / CardioMEMS or whether this is more of an RV issue.       Chronic systolic heart failure secondary to NICM.    Stage D    NYHA Class  III  ACEi/ARB: yes  BB: on Toprol XL YES   Aldosterone antagonist: Aldactone to 25 mg daily.   SCD prophylaxis: CRT-D  Fluid status:     Presently volume up-we will do metolazone 2.5 mg tomorrow morning, increase Bumex to 4 VAD, will take potassium 40 3 times a day tomorrow and then 40 twice a day thereafter.  We will do a BMP and a weight check next week.  I am having him see the nurse practitioners in 3 weeks from now to ensure absence of arrhythmia and also that the fluid status is on course.  If we have any question in the weeks to come that his volume is significantly up and we are not making headway-would favor a right heart catheterization in the setting of decompensation to sort this out.      MAP-presently above goal-increasing hydralazine to 50- 3 times a day  Anticoagulation: Warfarin INR goal 2-3.   Antiplatelet: restart ASA dose 81 mg daily.  LDH: Stable     Staph hominis bacteremia: cleared-now on oral suppression    CKD Stage III:-We have been struggling, suspect cardiorenal-low threshold for repeat right heart catheterization if worsening     Paroxysmal Atrial Fibrillation- in sinus rhythm presently - see VT plan above, on AC     History of ventricular tachycardia-recent recurrence.  Review of his interrogation to me today looks like this might have been VF.  He recently changed antidepressants prior to that obtaining EKG today to ensure his QT is not prolonged-we are making sure his potassium stays above 4 and also he is now on amiodarone 400 mg daily-I am sending thyroid is is is not been done in some time      paroxysmal AF: in sinus presently -on amiodarone 400 mg a day    COPD: per pulmonary -I am worried this may be prohibitive for cardiac transplant but this needs to be discussed with larger group    Staph hominis bacteremia follow 2020    Fatigue, bilateral carpal tunnel-sending serum and urine immunofixation to ensure absence of paraproteinemia    RTC 3 months with NP 6 with me - sooner as  needed       We will need to readdress transplant once we get his weight under better control his weight has gone above target range during the pandemic.  I am hopeful with the return to school of his granddaughter in January he will be able to work more on his conditioning.  We also need to do our part to get him better as well       Delisa Montgomery MD   of Medicine   [facility] Division of Cardiology

## 2020-12-18 NOTE — PATIENT INSTRUCTIONS
It was a pleasure to see you in clinic today. Please do not hesitate to call with any questions or concerns. You are scheduled for a remote ICD transmission on 3/18/2021. This will happen automatically in the night. We will call in 1-2 business days to discuss the results with you. We look forward to seeing you in clinic at your next device check in 6 months    Anastasia Cain, RN  Electrophysiology Nurse Clinician  [facility]  During business hours call:  [phone]  After business hours please call: [phone]- select option #4 and ask for job code [id number].

## 2020-12-18 NOTE — LETTER
12/18/2020      RE: Jim Willingham  [address] [facility] [id number]       Dear Colleague,    Thank you for the opportunity to participate in the care of your patient, Jim Willingham, at the [facility]. Please see a copy of my visit note below.    December 18, 2020      HPI:   Jim Willingham is a 63 year old male with a past medical history of NICM (EF 20%) s/p HM II LVAD placement (6/19/17) at DT (obesity) /     Staph hominis bacteremia and completed 6 weeks of IV vancomycin he has had no recurrence of symptoms.     More recently he has been fluctuating a lot with his weight.  He does think he has gained some weight since the pandemic started but he has now put fluid into his ankles and belly.     His weight is likely 10 pounds above his dry weight.     He was also had several shocks last week due to ventricular arrhythmias-saw one of my partners and his diuretics were decreased given low potassium.     His weight is up another 5 to 6 pounds from that time.     He feels short of breath with walking even 2 or 3 blocks.  He is less active than he wants to be he is exhausted.  He is also having bilateral carpal tunnel surgery soon..     He presently denies PND orthopnea.     In the last 2 years-  he has lost a substantial amount of weight and this was intentional-as he wants to be considered for transplant.  His transplant evaluation is ongoing. Weight is unfortunately back up again     Other recent issues:      atrial fibrillation starting earlier this year.  He did see electrophysiology as the pacemaker settings were changed and there was some discussion about whether or not he should have a cardioversion or just leave him in atrial fibrillation.  He was eventually cardioverted and is on amiodarone.  He did have symptomatic improvement with this.     He denies fevers, chills, driveline redness, tenderness, or discharge. No LVAD  alarms or blood in the urine or stools.    Generally just not feeling as well as he would like.     PAST MEDICAL HISTORY:  Past Medical History:   Diagnosis Date     Benign essential hypertension 5/11/2017     Bilateral carpal tunnel syndrome 11/2/2020     Cardiomyopathy, unspecified (H) 5/8/2017     CKD (chronic kidney disease) stage 3, GFR 30-59 ml/min 5/11/2017     COPD (chronic obstructive pulmonary disease) (H) 11/2/2020     Depression 5/11/2017     Diabetes mellitus (H) 5/11/2017     Gouty arthropathy, chronic, without tophi 11/2/2020     H/O gastric bypass 5/11/2017     ICD (implantable cardioverter-defibrillator), biventricular, in situ 5/11/2017     LVAD (left ventricular assist device) present (H)      Major depression, recurrent, chronic (H) 11/2/2020     NICM (nonischemic cardiomyopathy) (H)/ EF 20% 5/11/2017    ECHO: LVEDd. 7.66 cm, Restrictive pattern , Severe mitral valve regurgitation     CECILIA (obstructive sleep apnea) 5/11/2017     Paroxysmal atrial fibrillation (H) 5/11/2017     Paroxysmal VT (H) 5/11/2017     Rotator cuff tear arthropathy of both shoulders 11/2/2020     Uncomplicated asthma      Vitamin B12 deficiency (non anemic) 5/11/2017       FAMILY HISTORY:  Family History   Problem Relation Age of Onset     Cerebrovascular Disease Mother 64     Diabetes Mother      Hypertension Mother      Coronary Artery Disease Father      Diabetes Type 2  Father      Obesity Brother      Obesity Brother      Cerebrovascular Disease Daughter 40     SOCIAL HISTORY:  No changes     CURRENT MEDICATIONS:  Current Outpatient Medications   Medication Sig Dispense Refill     acetaminophen (TYLENOL) 325 MG tablet Take 650 mg by mouth every 6 hours as needed for mild pain       albuterol (PROAIR HFA) 108 (90 Base) MCG/ACT inhaler Inhale 2 puffs into the lungs every 4 hours as needed for shortness of breath / dyspnea or wheezing        allopurinol (ZYLOPRIM) 300 MG tablet Take 1 tablet (300 mg) by mouth daily 90  tablet 1     amiodarone (PACERONE) 200 MG tablet Take 400 mg by mouth daily       aspirin 81 MG chewable tablet 1 tablet (81 mg) by Oral or Feeding Tube route daily 36 tablet      blood glucose monitoring (ONE TOUCH ULTRA 2) meter device kit Use to test blood sugar four times daily or as directed. 1 kit 0     blood glucose VI test strip Use to test blood sugar four times daily or as directed. 200 each 0     bumetanide (BUMEX) 2 MG tablet Take 3mg (1.5 tabs) twice a day 540 tablet 3     buPROPion (WELLBUTRIN XL) 150 MG 24 hr tablet Take 1 tablet (150 mg) by mouth every morning 60 tablet 1     cyanocobalamin (VITAMIN B12) 1000 MCG/ML injection Inject 1,000 mcg into the muscle every 30 days       fluticasone-vilanterol (BREO ELLIPTA) 200-25 MCG/INH oral inhaler Inhale 1 puff into the lungs daily       gabapentin (NEURONTIN) 300 MG capsule Take 2 capsules (600 mg) by mouth At Bedtime (Patient taking differently: Take 300 mg by mouth At Bedtime 300mg in am, 300mg in the afternoon, 600mg at bedtime.) 30 capsule 3     hydrALAZINE (APRESOLINE) 10 MG tablet Take 3 tablets (30 mg) by mouth 3 times daily 540 tablet 5     insulin glargine (LANTUS SOLOSTAR) 100 UNIT/ML pen Inject 6 Units Subcutaneous At Bedtime 9 mL 3     insulin lispro (HUMALOG KWIKPEN) 100 UNIT/ML (1 unit dial) KWIKPEN Inject 1-7 Units Subcutaneous 4 times daily 3 mL 1     insulin pen needle (32G X 4 MM) 32G X 4 MM miscellaneous Use four pen needles daily or as directed. 110 each 0     metolazone (ZAROXOLYN) 2.5 MG tablet Take 1 tablet (2.5 mg) by mouth as needed (take ONLY as directed by VAD team) 5 tablet 0     montelukast (SINGULAIR) 10 MG tablet Take 10 mg by mouth At Bedtime        pantoprazole (PROTONIX) 40 MG EC tablet Take 1 tablet (40 mg) by mouth every morning 60 tablet 5     potassium chloride ER (KLOR-CON M) 10 MEQ CR tablet Take 30mEq in the morning and 20mEq in the afternoon. 270 tablet 3     predniSONE (DELTASONE) 5 MG tablet Take 1 tablet (5  "mg) by mouth daily 30 tablet 2     traMADol (ULTRAM) 50 MG tablet Take 2 tablets (100 mg) by mouth every 6 hours as needed for moderate pain or breakthrough pain 28 tablet      umeclidinium (INCRUSE ELLIPTA) 62.5 MCG/INH oral inhaler Inhale 1 puff into the lungs daily       warfarin ANTICOAGULANT (COUMADIN) 5 MG tablet Take 1 tablet (5 mg) by mouth daily (Patient taking differently: Take 5 mg by mouth daily 7.5 Sunday and thursday) 30 tablet 3     zinc sulfate (ZINCATE) 220 (50 Zn) MG capsule Take 220 mg by mouth 2 times daily       Review of systems: 12 point review of systems negative unless noted in the HPI-     EXAM:  BP (!) 90/0 (BP Location: Right arm, Patient Position: Chair, Cuff Size: Adult Regular)   Pulse 80   Ht 1.727 m (5' 8\")   Wt 110.2 kg (243 lb)   SpO2 97%   BMI 36.95 kg/m    General: appears comfortable, alert and articulate, in a wheelchair , not on oxygen   Head: normocephalic, atraumatic  Eyes: anicteric sclera, EOMI  Neck: no adenopathy-JVP is to the angle of the jaw  Orophyarynx: moist mucosa, no lesions, dentition intact  Heart:LVAD hum, PMI diffuse   Lungs: clear, no rales or wheezing  Abdomen: soft, non-tender, bowel sounds present, no hepatosplenomegaly positive abdominal distention  Extremities: no clubbing, cyanosis 2+ lower extremity edema  Neurological: normal speech and affect, no gross motor deficits       Interpretation Summary  HM2 at 9400RPM.  Severely (EF <30%) reduced left ventricular function is present. Mild left  ventricular dilation is present. LVIDd 61mm.  Septum midline.  Aortic valve remain closed with no AI. Normal inflow velocities (62 cm/s).  Outflow velocity cannot be assessed.  Global right ventricular function is mildly to moderately reduced.  Dilation of the inferior vena cava is present with abnormal respiratory  variation in diameter suggests a high RA pressure estimated at 15 mmHg or  greater.  Mild to moderate mitral insufficiency is present.     This " study was compared with the study from 8/5/2020 .  IVC is dilated now with higher RA pressure. Otherwise no significant change      RA Pressures  3:41 PM   6    7    7      107      RV Pressures  3:42 PM 24        6     153      PA Pressures  3:43 PM 23    11    15        156      PCW Pressures  3:42 PM   4    4    5      153      Blood Flow Results Phase: Baseline     Time Results  Indexed Values (L/min/m2)   QP  3:32 PM 4.05 L/min    1.84      QS  3:32 PM 4.05 L/min    1.84      Blood Oximetry Phase: Baseline     Time Hb  SAT(%)  PO2  Content (mL/dL) PA Sat (%)   PA  3:32 PM  58.3 %      58.3      Art  3:32 PM  99 %     17.64               PA due to reports of continued mild/moderate SOB with mild pitting edema of BLE. Patient has a Medtronic Biventricular ICD programmed AAIR. Normal ICD function. No episodes/arrhythmias recorded. Presenting EGM = SR @ 85 bpm. AP = 0.5%.  = 0%. OptiVol fluid index is above the threshold >200 since mid August. Estimated battery longevity to MARVA = 10 months. No short v-v intervals recorded. Lead trends appear stable. Patient notified of interrogation results by voicemail.      RECOMMENDATIONS:  - Will complete a 6 week course of IV vancomycin (stop date 10/30).  - Will repeat blood cx x2 ~5-7 days after stopping abx.  PICC can be pulled at that time.   - If he has recurrent CoNS bacteremia, would then plan on lifelong suppression with oral antibiotics.          Assessment and Plan:    In summary Jim Willingham has a history of NICM (EF 20%) s/p HM II LVAD as DT (initially DT due to obesity).      He has been struggling recently and I am trying to figure out exactly why.     Every time we have done a right heart catheterization on him is been when he is very dry-which is not very informative.  I am not totally sure whether we have an adequate LVAD speed or whether this is an RV issue based on what I am seeing.     I am increasing his diuretics today but the next time he is  decompensated - c I would vote that we do a right heart cath that moment to try to capture whether or not we need more speed / CardioMEMS or whether this is more of an RV issue.       Chronic systolic heart failure secondary to NICM.    Stage D    NYHA Class III  ACEi/ARB: yes  BB: on Toprol XL YES   Aldosterone antagonist: Aldactone to 25 mg daily.   SCD prophylaxis: CRT-D  Fluid status:     Presently volume up-we will do metolazone 2.5 mg tomorrow morning, increase Bumex to 4 VAD, will take potassium 40 3 times a day tomorrow and then 40 twice a day thereafter.  We will do a BMP and a weight check next week.  I am having him see the nurse practitioners in 3 weeks from now to ensure absence of arrhythmia and also that the fluid status is on course.  If we have any question in the weeks to come that his volume is significantly up and we are not making headway-would favor a right heart catheterization in the setting of decompensation to sort this out.      MAP-presently above goal-increasing hydralazine to 50- 3 times a day  Anticoagulation: Warfarin INR goal 2-3.   Antiplatelet: restart ASA dose 81 mg daily.  LDH: Stable     Staph hominis bacteremia: cleared-now on oral suppression    CKD Stage III:-We have been struggling, suspect cardiorenal-low threshold for repeat right heart catheterization if worsening     Paroxysmal Atrial Fibrillation- in sinus rhythm presently - see VT plan above, on AC     History of ventricular tachycardia-recent recurrence.  Review of his interrogation to me today looks like this might have been VF.  He recently changed antidepressants prior to that obtaining EKG today to ensure his QT is not prolonged-we are making sure his potassium stays above 4 and also he is now on amiodarone 400 mg daily-I am sending thyroid is is is not been done in some time      paroxysmal AF: in sinus presently -on amiodarone 400 mg a day    COPD: per pulmonary -I am worried this may be prohibitive for cardiac  transplant but this needs to be discussed with larger group    Staph hominis bacteremia follow 2020    Fatigue, bilateral carpal tunnel-sending serum and urine immunofixation to ensure absence of paraproteinemia    RTC 3 months with NP 6 with me - sooner as needed       We will need to readdress transplant once we get his weight under better control his weight has gone above target range during the pandemic.  I am hopeful with the return to school of his granddaughter in January he will be able to work more on his conditioning.  We also need to do our part to get him better as well       Delisa Montgomery MD   of Medicine   [facility]           Please do not hesitate to contact me if you have any questions/concerns.     Sincerely,     Delisa Montgomery MD

## 2020-12-18 NOTE — PATIENT INSTRUCTIONS
Medications:  1. INCREASE Bumex to 4mg twice daily  2. INCREASE Potassium to 40mEq twice daily  3. You can take a dose of metolazone 2.5mg either this afternoon or tomorrow am. Take this medication 30minutes before your bumex dose. Continue to take your regular bumex dose. On the day you take metolazone, take an extra 40mEq of potassium.  4. INCREASE Hydralazine to 50mg three times per day.    Instructions:  1. Please go back to lab today to get the additional labs that Dr. Montgomery ordered today.  2. Please get a lab appt for 12/23. Call the VAD coordinator on call this day for a weight check-in  3. We will get an EKG today.    Follow-up: (make these appointments before you leave)  1. Please follow-up with VAD AFSANEH in 2 weeks with labs prior. Can appointment be virtual? No   2. Please follow-up with Dr. Montgomery in 6 weeks with labs prior.  Can appointment be virtual? No    Page the VAD Coordinator on call if you gain more than 3 lb in a day or 5 in a week. Please also page if you feel unwell or have alarms.     Great to see you in clinic today. To Page the VAD Coordinator on call, dial [phone] option #[phone] and ask to speak to the VAD coordinator on call.

## 2020-12-18 NOTE — NURSING NOTE
1). PUMP DATA  Primary controller serial number: [id number]     II:   Flow: 5.5 L/min,    Speed: 9400 RPMs,     PI: 5.2 ,  Power: 6 Manuel,      Primary controller   Back up battery: Patient use: 50, Replace in: 15  Months     Data downloaded: No   Equipment and driveline assessed for damage: Yes     Back up : Serial number: [id number]  Back up battery: Patient use: 4 Replace in: 18  Months  Programmed settings identical to the settings on the primary controller :Yes      Education complete: Yes   Charge the BACKUP controller s backup battery every 6 months  Perform a self test on BACKUP every 6 months  Change the MPU s batteries every 6 months:Yes  Have equipment serviced yearly (if applicable):Yes    2). ALARMS  Alarms reported by patient since last pump evaluation: Yes - NO EXTERNAL POWER alarms x5. Pt reports tripping on cord or forgetting to plug MPU back in after vacuuming.   Alarms or other finding noted during pump data history and alarm download: Yes - some PI events with PI ranging 2.8-6. Rare speed drops. Hx back over 2 weeks.    Action Taken:  Reviewed data with patient: Yes      3). DRESSING CHANGE / DRIVELINE ASSESSMENT  Dressing change completed today: No  Appearance of Driveline site: c/d/i per pt report    Driveline stabilization: Method: [employer]  [ Teaching reinforced on need for stabilization of Driveline. ]

## 2020-12-18 NOTE — NURSING NOTE
Chief Complaint   Patient presents with     Follow Up     LVAD 3 month labs and remote      Vitals were taken and medications were reconciled.     Silvia López RMA  10:54 AM

## 2020-12-18 NOTE — PROGRESS NOTES
ANTICOAGULATION FOLLOW-UP CLINIC VISIT    Patient Name:  Jim Willingham  Date:  2020  Contact Type:  Telephone    SUBJECTIVE:         OBJECTIVE    Recent labs: (last 7 days)     20  1018   INR 1.99*       ASSESSMENT / PLAN  INR assessment THER    Recheck INR In: 10 DAYS    INR Location Clinic      Anticoagulation Summary  As of 2020    INR goal:  2.0-3.0   TTR:  60.7 % (10.9 mo)   INR used for dosin.99 (2020)   Warfarin maintenance plan:  7.5 mg (5 mg x 1.5) every Mon, Wed, Fri; 5 mg (5 mg x 1) all other days   Full warfarin instructions:  7.5 mg every Mon, Wed, Fri; 5 mg all other days   Weekly warfarin total:  42.5 mg   Plan last modified:  Maricarmen Potter RN (2020)   Next INR check:  2020   Priority:  Critical   Target end date:  Indefinite    Indications    LVAD (left ventricular assist device) present (H) [Z95.811]  Long-term (current) use of anticoagulants [Z79.01] [Z79.01]  Chronic systolic congestive heart failure (H) [I50.22]             Anticoagulation Episode Summary     INR check location:      Preferred lab:      Send INR reminders to:  [facility]    Comments:  +++Patient drawn at Home care visit Q Mon. (8/10/20)+++  Labs drawn either at [facility] or [facility]  LVAD placed 17   II  ASA 81 mg daily      Anticoagulation Care Providers     Provider Role Specialty Phone number    Delisa Montgomery MD Referring Advanced Heart Failure and Transplant Cardiology [phone]            See the Encounter Report to view Anticoagulation Flowsheet and Dosing Calendar (Go to Encounters tab in chart review, and find the Anticoagulation Therapy Visit)  Left message for patient with results and dosing recommendations. Asked patient to call back to report any missed doses, falls, signs and symptoms of bleeding or clotting, any changes in health, medication, or diet. Asked patient to call back with any questions or concerns.  Patient had LVAD placed  on:   6/19/17  Type of LVAD: HM2  Patient's current Aspirin dose: 81mg  LVAD Protocol followed:  Yes  Delisa Hillman RN

## 2020-12-21 ENCOUNTER — CARE COORDINATION (OUTPATIENT)
Dept: CARDIOLOGY | Facility: CLINIC | Age: 65
End: 2020-12-21

## 2020-12-21 DIAGNOSIS — I50.22 CHRONIC SYSTOLIC CONGESTIVE HEART FAILURE (H): ICD-10-CM

## 2020-12-21 DIAGNOSIS — M1A.3790 CHRONIC GOUT DUE TO RENAL IMPAIRMENT INVOLVING FOOT WITHOUT TOPHUS, UNSPECIFIED LATERALITY: ICD-10-CM

## 2020-12-21 DIAGNOSIS — Z95.811 LVAD (LEFT VENTRICULAR ASSIST DEVICE) PRESENT (H): ICD-10-CM

## 2020-12-21 LAB
ANION GAP SERPL CALCULATED.3IONS-SCNC: 6 MMOL/L (ref 3–14)
BUN SERPL-MCNC: 71 MG/DL (ref 7–30)
CALCIUM SERPL-MCNC: 8.5 MG/DL (ref 8.5–10.1)
CHLORIDE SERPL-SCNC: 101 MMOL/L (ref 94–109)
CO2 SERPL-SCNC: 29 MMOL/L (ref 20–32)
CREAT SERPL-MCNC: 2.1 MG/DL (ref 0.66–1.25)
GFR SERPL CREATININE-BSD FRML MDRD: 32 ML/MIN/{1.73_M2}
GLUCOSE SERPL-MCNC: 101 MG/DL (ref 70–99)
INTERPRETATION ECG - MUSE: NORMAL
POTASSIUM SERPL-SCNC: 4.5 MMOL/L (ref 3.4–5.3)
SODIUM SERPL-SCNC: 136 MMOL/L (ref 133–144)
URATE SERPL-MCNC: 4.5 MG/DL (ref 3.5–7.2)

## 2020-12-21 PROCEDURE — 84550 ASSAY OF BLOOD/URIC ACID: CPT | Performed by: INTERNAL MEDICINE

## 2020-12-21 PROCEDURE — 82784 ASSAY IGA/IGD/IGG/IGM EACH: CPT | Performed by: INTERNAL MEDICINE

## 2020-12-21 PROCEDURE — 86334 IMMUNOFIX E-PHORESIS SERUM: CPT | Performed by: PATHOLOGY

## 2020-12-21 PROCEDURE — 36415 COLL VENOUS BLD VENIPUNCTURE: CPT | Performed by: INTERNAL MEDICINE

## 2020-12-21 PROCEDURE — 86335 IMMUNFIX E-PHORSIS/URINE/CSF: CPT | Performed by: PATHOLOGY

## 2020-12-21 PROCEDURE — 80048 BASIC METABOLIC PNL TOTAL CA: CPT | Performed by: INTERNAL MEDICINE

## 2020-12-21 NOTE — PROGRESS NOTES
D: Pt paged VAD Coord on-call and reported that he got labs drawn today and was instructed to call with weights.  Per clinic instructions, pt was supposed to have labs and weight check on Wed 12/23.   I/A: Pt reported that weight on 12/18 was 234.4, 12/19 was 232.6, 12/20 was 231.6, and today was 226.0. Pt reported that he took metolazone, as directed, on Saturday. Pt denied lightheadedness, reported less SOB than usual, denied swelling in legs and reported much less swelling in feet. Pt stated that his low back hurts and pt inquired if it could be his kidneys. Pt's potassium was 4.5 and creatinine was 2.10. Will discuss labs, weights and symptoms with provider.  P: Will call patient back after discussing with provider.

## 2020-12-22 ENCOUNTER — CARE COORDINATION (OUTPATIENT)
Dept: CARDIOLOGY | Facility: CLINIC | Age: 65
End: 2020-12-22

## 2020-12-22 DIAGNOSIS — I50.22 CHRONIC SYSTOLIC CONGESTIVE HEART FAILURE (H): Primary | ICD-10-CM

## 2020-12-22 LAB
IGA SERPL-MCNC: 205 MG/DL (ref 84–499)
IGG SERPL-MCNC: 838 MG/DL (ref 610–1616)
IGM SERPL-MCNC: 55 MG/DL (ref 35–242)
PROT ELPH PNL UR ELPH: NORMAL
PROT PATTERN SERPL IFE-IMP: NORMAL

## 2020-12-22 NOTE — PROGRESS NOTES
Reviewed pt's labs and weight trends with ROGER Rivera. Received instructions to maintain current dosing of diuretics and kcl, limit fluid to 2L daily, and recheck weights and BMP early next week. Pt should page VAD coordinator on call for continued weight loss more than 2lb from current weight or if he starts to regain weight or has fluid symptoms.   Called pt to review plan. Pt's weight today is 225.7lb. Pt verbalized understanding of plan.

## 2020-12-29 ENCOUNTER — CARE COORDINATION (OUTPATIENT)
Dept: CARDIOLOGY | Facility: CLINIC | Age: 65
End: 2020-12-29

## 2020-12-29 ENCOUNTER — ANTICOAGULATION THERAPY VISIT (OUTPATIENT)
Dept: ANTICOAGULATION | Facility: CLINIC | Age: 65
End: 2020-12-29

## 2020-12-29 DIAGNOSIS — I50.22 CHRONIC SYSTOLIC CONGESTIVE HEART FAILURE (H): ICD-10-CM

## 2020-12-29 DIAGNOSIS — Z79.01 LONG TERM CURRENT USE OF ANTICOAGULANT THERAPY: ICD-10-CM

## 2020-12-29 DIAGNOSIS — Z95.811 LVAD (LEFT VENTRICULAR ASSIST DEVICE) PRESENT (H): ICD-10-CM

## 2020-12-29 DIAGNOSIS — I50.22 CHRONIC SYSTOLIC CONGESTIVE HEART FAILURE (H): Primary | ICD-10-CM

## 2020-12-29 LAB
ANION GAP SERPL CALCULATED.3IONS-SCNC: 4 MMOL/L (ref 3–14)
BUN SERPL-MCNC: 53 MG/DL (ref 7–30)
CALCIUM SERPL-MCNC: 8.4 MG/DL (ref 8.5–10.1)
CHLORIDE SERPL-SCNC: 106 MMOL/L (ref 94–109)
CO2 SERPL-SCNC: 29 MMOL/L (ref 20–32)
CREAT SERPL-MCNC: 2.32 MG/DL (ref 0.66–1.25)
GFR SERPL CREATININE-BSD FRML MDRD: 29 ML/MIN/{1.73_M2}
GLUCOSE SERPL-MCNC: 102 MG/DL (ref 70–99)
INR PPP: 1.26 (ref 0.86–1.14)
POTASSIUM SERPL-SCNC: 3.9 MMOL/L (ref 3.4–5.3)
SODIUM SERPL-SCNC: 139 MMOL/L (ref 133–144)

## 2020-12-29 PROCEDURE — 80048 BASIC METABOLIC PNL TOTAL CA: CPT | Performed by: PHYSICIAN ASSISTANT

## 2020-12-29 PROCEDURE — 36415 COLL VENOUS BLD VENIPUNCTURE: CPT | Performed by: PHYSICIAN ASSISTANT

## 2020-12-29 PROCEDURE — 85610 PROTHROMBIN TIME: CPT | Performed by: INTERNAL MEDICINE

## 2020-12-29 RX ORDER — LIDOCAINE 40 MG/G
CREAM TOPICAL
Status: CANCELLED | OUTPATIENT
Start: 2020-12-29

## 2020-12-29 RX ORDER — POTASSIUM CHLORIDE 750 MG/1
TABLET, EXTENDED RELEASE ORAL
Qty: 720 TABLET | Refills: 3 | COMMUNITY
Start: 2020-12-29 | End: 2020-12-30

## 2020-12-29 NOTE — PROGRESS NOTES
Pt had labs drawn today as requested. Creat increased to 2.3 and INR down to 1.26.   Called pt to review labs. Pt reports he is feeling ok. Denies swelling in LE or SOB. His weight is up to 231b. He reports some dietary indiscretion over the holiday. He has not missed any doses of warfarin or had changes to his diet.   Discussed findings with Dr. Montgomery. MD would like to get pt a little more fluid up so we can do a RHC to give better understanding of how to treat. In previous RHC, pt has been dry, and unable to distinguish if pt would benefit from more speed or more RV support.   Plan to decrease bumex to 4mg in the am and 2mg in the pm, as well as decrease KCL to 40mEq in the am and 20mEq in the pm. Schedule a RHC and labs for next week.   Called pt to review plan. Pt verbalized understanding. RHC scheduled for 1/7 with 7am check in time. Pt reminded to be NPO for 4hrs prior to procedure and that he will need a COVID test 3-4 days prior, which the COVID scheduling team will order and set up.

## 2020-12-29 NOTE — PROGRESS NOTES
ANTICOAGULATION FOLLOW-UP CLINIC VISIT    Patient Name:  Jim Willingham  Date:  2020  Contact Type:  Telephone    SUBJECTIVE:  Patient Findings     Comments:  Pt denies missed doses, but he isn't certain of this either. Pt doesn't use a pillbox and doesn't think this will work. Spoke with Rajani LVAD Coordinator and Dr. Montgomery would like Lovenox 0.5mg/kd. Dosing for Lovenox is Lovenox 60mg Q 12 Hours and this prescription is sent to [facility].         Clinical Outcomes     Comments:  Pt denies missed doses, but he isn't certain of this either. Pt doesn't use a pillbox and doesn't think this will work. Spoke with Rajani LVAD Coordinator and Dr. Montgomery would like Lovenox 0.5mg/kd. Dosing for Lovenox is Lovenox 60mg Q 12 Hours and this prescription is sent to [facility].            OBJECTIVE    Recent labs: (last 7 days)     20  1343   INR 1.26*       ASSESSMENT / PLAN  INR assessment SUB    Recheck INR In: 2 DAYS    INR Location Clinic      Anticoagulation Summary  As of 2020    INR goal:  2.0-3.0   TTR:  59.0 % (10.9 mo)   INR used for dosin.26 (2020)   Warfarin maintenance plan:  5 mg (5 mg x 1) every Mon, Fri; 7.5 mg (5 mg x 1.5) all other days   Full warfarin instructions:  : 10 mg; : 10 mg; Otherwise 5 mg every Mon, Fri; 7.5 mg all other days   Weekly warfarin total:  47.5 mg   Plan last modified:  Radha Pedro, RN (2020)   Next INR check:  2020   Priority:  Critical   Target end date:  Indefinite    Indications    LVAD (left ventricular assist device) present (H) [Z95.811]  Long-term (current) use of anticoagulants [Z79.01] [Z79.01]  Chronic systolic congestive heart failure (H) [I50.22]             Anticoagulation Episode Summary     INR check location:      Preferred lab:      Send INR reminders to:  [facility]    Comments:  +++Patient drawn at Home care visit Q Mon. (8/10/20)+++  Labs drawn either at [facility] or [facility]  Eulalia Ochoa  LVAD placed 6/19/17  HM II  ASA 81 mg daily      Anticoagulation Care Providers     Provider Role Specialty Phone number    Delisa Montgomery MD Referring Advanced Heart Failure and Transplant Cardiology [phone]            See the Encounter Report to view Anticoagulation Flowsheet and Dosing Calendar (Go to Encounters tab in chart review, and find the Anticoagulation Therapy Visit)    Spoke with patient. Gave them their lab results and new warfarin recommendation.  No changes in health, medication, or diet. No missed doses, no falls. No signs or symptoms of bleed or clotting.     Patient had LVAD placed on:   6/19/17  Type of LVAD: HM 2  Patient's current Aspirin dose: ASA 81mg Daily  LVAD Protocol followed: Yes   If Not Followed Explanation:  N/A    Radha Pedro RN

## 2020-12-30 ENCOUNTER — CARE COORDINATION (OUTPATIENT)
Dept: CARDIOLOGY | Facility: CLINIC | Age: 65
End: 2020-12-30

## 2020-12-30 DIAGNOSIS — Z11.59 ENCOUNTER FOR SCREENING FOR OTHER VIRAL DISEASES: Primary | ICD-10-CM

## 2020-12-30 DIAGNOSIS — I50.22 CHRONIC SYSTOLIC CONGESTIVE HEART FAILURE (H): ICD-10-CM

## 2020-12-30 RX ORDER — BUMETANIDE 2 MG/1
TABLET ORAL
Qty: 540 TABLET | Refills: 3 | Status: ON HOLD | COMMUNITY
Start: 2020-12-30 | End: 2021-01-11

## 2020-12-30 RX ORDER — POTASSIUM CHLORIDE 750 MG/1
TABLET, EXTENDED RELEASE ORAL
Qty: 720 TABLET | Refills: 3 | Status: ON HOLD | COMMUNITY
Start: 2020-12-30 | End: 2021-01-11

## 2020-12-30 NOTE — PROGRESS NOTES
D: Pt called VAD Coordinator on-call and reported weight gain.   I/A: Pt reported being up 1.5 pounds since yesterday and 9.5 pounds in the last week. Pt stated that he started to notice swelling in his ankles last night. Pt denied SOB. Discussed w/ Dr. Montgomery. Per Dr. Montgomery, instructed pt to take bumex 4mg BID every other day alternating with bumex 4mg in the morning and 2mg in the PM. Also, per Dr. Montgomery, instructed pt to take potassium 40meq BID on the days he takes bumex 4mg BID and potassium 40meq in the morning and 20meq in the PM on days he takes bumex 4mg in the morning and 2mg in the PM. Pt should do this until his RHC next week. Pt verbalzied understanding.   P: RHC 1/7.

## 2020-12-31 ENCOUNTER — ANTICOAGULATION THERAPY VISIT (OUTPATIENT)
Dept: ANTICOAGULATION | Facility: CLINIC | Age: 65
End: 2020-12-31

## 2020-12-31 DIAGNOSIS — Z79.01 LONG TERM CURRENT USE OF ANTICOAGULANT THERAPY: ICD-10-CM

## 2020-12-31 DIAGNOSIS — I50.22 CHRONIC SYSTOLIC CONGESTIVE HEART FAILURE (H): ICD-10-CM

## 2020-12-31 DIAGNOSIS — Z95.811 LVAD (LEFT VENTRICULAR ASSIST DEVICE) PRESENT (H): ICD-10-CM

## 2020-12-31 LAB
ANION GAP SERPL CALCULATED.3IONS-SCNC: 2 MMOL/L (ref 3–14)
BUN SERPL-MCNC: 44 MG/DL (ref 7–30)
CALCIUM SERPL-MCNC: 8.4 MG/DL (ref 8.5–10.1)
CHLORIDE SERPL-SCNC: 104 MMOL/L (ref 94–109)
CO2 SERPL-SCNC: 30 MMOL/L (ref 20–32)
CREAT SERPL-MCNC: 1.8 MG/DL (ref 0.66–1.25)
GFR SERPL CREATININE-BSD FRML MDRD: 39 ML/MIN/{1.73_M2}
GLUCOSE SERPL-MCNC: 125 MG/DL (ref 70–99)
INR PPP: 1.33 (ref 0.86–1.14)
POTASSIUM SERPL-SCNC: 3.5 MMOL/L (ref 3.4–5.3)
SODIUM SERPL-SCNC: 136 MMOL/L (ref 133–144)

## 2020-12-31 PROCEDURE — 80048 BASIC METABOLIC PNL TOTAL CA: CPT | Performed by: INTERNAL MEDICINE

## 2020-12-31 PROCEDURE — 85610 PROTHROMBIN TIME: CPT | Performed by: INTERNAL MEDICINE

## 2020-12-31 PROCEDURE — 36415 COLL VENOUS BLD VENIPUNCTURE: CPT | Performed by: INTERNAL MEDICINE

## 2020-12-31 NOTE — PROGRESS NOTES
ANTICOAGULATION FOLLOW-UP CLINIC VISIT    Patient Name:  Jim Willingham  Date:  2020  Contact Type:  Telephone    SUBJECTIVE:  Patient Findings     Comments:  Spoke with Jim.  He reports he did not miss any doses of warfarin.  No changes in health, diet.  His bumex dose is being adjusted.  Per VAD coordinator, RajaniJim will check his INR 2021. He will continue lovenox 60 mg BID, but he will not take lovenox on Monday morning before his INR is tested.  Discussed INR result/warfarin recommendations with Ronal Finney.          Clinical Outcomes     Comments:  Spoke with Jim.  He reports he did not miss any doses of warfarin.  No changes in health, diet.  His bumex dose is being adjusted.  Per VAD coordinator, Rajani Jim will check his INR 2021. He will continue lovenox 60 mg BID, but he will not take lovenox on Monday morning before his INR is tested.  Discussed INR result/warfarin recommendations with Ronal Finney.             OBJECTIVE    Recent labs: (last 7 days)     20  0938   INR 1.33*       ASSESSMENT / PLAN  INR assessment SUB    Recheck INR In: 4 DAYS    INR Location Clinic      Anticoagulation Summary  As of 2020    INR goal:  2.0-3.0   TTR:  58.6 % (10.9 mo)   INR used for dosin.33 (2020)   Warfarin maintenance plan:  10 mg (5 mg x 2) every Thu; 7.5 mg (5 mg x 1.5) all other days   Full warfarin instructions:  : 15 mg; Otherwise 10 mg every Thu; 7.5 mg all other days   Weekly warfarin total:  55 mg   Plan last modified:  Marilyn Madrigal RPH (2020)   Next INR check:  2021   Priority:  Critical   Target end date:  Indefinite    Indications    LVAD (left ventricular assist device) present (H) [Z95.811]  Long-term (current) use of anticoagulants [Z79.01] [Z79.01]  Chronic systolic congestive heart failure (H) [I50.22]             Anticoagulation Episode Summary     INR check location:      Preferred lab:      Send INR reminders  to:  [facility]    Comments:  +++Patient drawn at Home care visit Q Mon. (8/10/20)+++  Labs drawn either at [facility] or [facility]  LVAD placed 6/19/17   II  ASA 81 mg daily      Anticoagulation Care Providers     Provider Role Specialty Phone number    Ulises Delisa Sprague MD Referring Advanced Heart Failure and Transplant Cardiology [phone]            See the Encounter Report to view Anticoagulation Flowsheet and Dosing Calendar (Go to Encounters tab in chart review, and find the Anticoagulation Therapy Visit)    Spoke with Jim, VAD coordinator, Rajani; and Marilyn ARGUETA.  Maintenance dose adjusted.  Jim will continue on lovenox 60 mg BID until INR is therapeutic.  He will not take lovenox on 1/4/2021 before his INR test per VAD team.  Patient had LVAD placed on:   6/19/17  Type of LVAD: HM 2   Patient's current Aspirin dose: 81 mg daily  LVAD Protocol followed: No, he will recheck INR 1/4/2021.  OK per VAD coordinator     Kirsty Bermudez RN

## 2021-01-04 DIAGNOSIS — Z11.59 ENCOUNTER FOR SCREENING FOR OTHER VIRAL DISEASES: ICD-10-CM

## 2021-01-04 DIAGNOSIS — Z95.811 LVAD (LEFT VENTRICULAR ASSIST DEVICE) PRESENT (H): ICD-10-CM

## 2021-01-04 DIAGNOSIS — Z79.01 LONG TERM CURRENT USE OF ANTICOAGULANT THERAPY: ICD-10-CM

## 2021-01-04 LAB
INR PPP: 2.18 (ref 0.86–1.14)
SARS-COV-2 RNA SPEC QL NAA+PROBE: NORMAL
SPECIMEN SOURCE: NORMAL

## 2021-01-04 PROCEDURE — 85610 PROTHROMBIN TIME: CPT | Performed by: INTERNAL MEDICINE

## 2021-01-04 PROCEDURE — 36416 COLLJ CAPILLARY BLOOD SPEC: CPT | Performed by: INTERNAL MEDICINE

## 2021-01-04 PROCEDURE — U0005 INFEC AGEN DETEC AMPLI PROBE: HCPCS | Performed by: INTERNAL MEDICINE

## 2021-01-04 PROCEDURE — U0003 INFECTIOUS AGENT DETECTION BY NUCLEIC ACID (DNA OR RNA); SEVERE ACUTE RESPIRATORY SYNDROME CORONAVIRUS 2 (SARS-COV-2) (CORONAVIRUS DISEASE [COVID-19]), AMPLIFIED PROBE TECHNIQUE, MAKING USE OF HIGH THROUGHPUT TECHNOLOGIES AS DESCRIBED BY CMS-2020-01-R: HCPCS | Performed by: INTERNAL MEDICINE

## 2021-01-05 ENCOUNTER — ANTICOAGULATION THERAPY VISIT (OUTPATIENT)
Dept: ANTICOAGULATION | Facility: CLINIC | Age: 66
End: 2021-01-05

## 2021-01-05 DIAGNOSIS — Z95.811 LVAD (LEFT VENTRICULAR ASSIST DEVICE) PRESENT (H): ICD-10-CM

## 2021-01-05 DIAGNOSIS — I50.22 CHRONIC SYSTOLIC CONGESTIVE HEART FAILURE (H): ICD-10-CM

## 2021-01-05 DIAGNOSIS — Z79.01 LONG TERM CURRENT USE OF ANTICOAGULANT THERAPY: ICD-10-CM

## 2021-01-05 LAB
LABORATORY COMMENT REPORT: NORMAL
SARS-COV-2 RNA SPEC QL NAA+PROBE: NEGATIVE
SPECIMEN SOURCE: NORMAL

## 2021-01-05 NOTE — PROGRESS NOTES
ANTICOAGULATION FOLLOW-UP CLINIC VISIT    Patient Name:  Jim Willingham  Date:  2021  Contact Type:  Telephone    SUBJECTIVE:  Patient Findings     Comments:  Reports that his INR may have been low last week due to eating some celery sticks. Consult with Marilu SIEGEL RP for dosing instructions. Advised patient to stop Lovenox injections with therapeutic INR of 2.18 today. Advised patient to change maintenance dosing to 10mg every Tues, Fri; 7.5mg all other days starting today. Patient has a right heart cath this week . Next INR .        Clinical Outcomes     Comments:  Reports that his INR may have been low last week due to eating some celery sticks. Consult with Marilu SIEGEL RPH for dosing instructions. Advised patient to stop Lovenox injections with therapeutic INR of 2.18 today. Advised patient to change maintenance dosing to 10mg every Tues, Fri; 7.5mg all other days starting today. Patient has a right heart cath this week . Next INR .           OBJECTIVE    Recent labs: (last 7 days)     21  0943   INR 2.18*       ASSESSMENT / PLAN  INR assessment THER    Recheck INR In: 2 DAYS    INR Location Clinic      Anticoagulation Summary  As of 2021    INR goal:  2.0-3.0   TTR:  57.5 % (10.8 mo)   INR used for dosin.18 (2021)   Warfarin maintenance plan:  10 mg (5 mg x 2) every Tue, Fri; 7.5 mg (5 mg x 1.5) all other days   Full warfarin instructions:  10 mg every Tue, Fri; 7.5 mg all other days   Weekly warfarin total:  57.5 mg   Plan last modified:  Maricarmen Potter RN (2021)   Next INR check:  2021   Priority:  Critical   Target end date:  Indefinite    Indications    LVAD (left ventricular assist device) present (H) [Z95.811]  Long-term (current) use of anticoagulants [Z79.01] [Z79.01]  Chronic systolic congestive heart failure (H) [I50.22]             Anticoagulation Episode Summary     INR check location:      Preferred lab:      Send INR reminders to:  [facility]     Comments:  +++Patient drawn at Home care visit Q Mon. (8/10/20)+++  Labs drawn either at [facility] or [facility]  LVAD placed 6/19/17   II  ASA 81 mg daily      Anticoagulation Care Providers     Provider Role Specialty Phone number    Delisa Montgomery MD Referring Advanced Heart Failure and Transplant Cardiology [phone]            See the Encounter Report to view Anticoagulation Flowsheet and Dosing Calendar (Go to Encounters tab in chart review, and find the Anticoagulation Therapy Visit)    See patient findings for details. Maintenance dosing plan changed. Consulted with Marilu SIEGEL RPH for dosing recommendations.     Patient had LVAD placed on:   6/19/17  Type of LVAD: HM2  Patient's current Aspirin dose: 81mg  LVAD Protocol followed: yes     TARYN MAN RN

## 2021-01-06 ENCOUNTER — TELEPHONE (OUTPATIENT)
Dept: CARDIOLOGY | Facility: CLINIC | Age: 66
End: 2021-01-06

## 2021-01-06 NOTE — PROGRESS NOTES
SUBJECTIVE:  Jim Willingham is a 65 year old male who is seen in consultation at the request of Dr. Ag, for Bilateral hand problems x years  Symptoms:   Has numbness and tingling in radial 3 digits.  Other symptoms include dropping things due to sensation issues, fine motor tasks difficult..     Treatment: previous carpal tunnel corticosteroid injection left side on 11/27. Helped severe burning.    The symptoms not helped by a splint.  .  Suspected cause: Due to unknown factors.      Job description: not employed    Past Medical History:   Diagnosis Date     Benign essential hypertension 5/11/2017     Bilateral carpal tunnel syndrome 11/2/2020     Cardiomyopathy, unspecified (H) 5/8/2017     CKD (chronic kidney disease) stage 3, GFR 30-59 ml/min 5/11/2017     COPD (chronic obstructive pulmonary disease) (H) 11/2/2020     Depression 5/11/2017     Diabetes mellitus (H) 5/11/2017     Gouty arthropathy, chronic, without tophi 11/2/2020     H/O gastric bypass 5/11/2017     ICD (implantable cardioverter-defibrillator), biventricular, in situ 5/11/2017     LVAD (left ventricular assist device) present (H)      Major depression, recurrent, chronic (H) 11/2/2020     NICM (nonischemic cardiomyopathy) (H)/ EF 20% 5/11/2017    ECHO: LVEDd. 7.66 cm, Restrictive pattern , Severe mitral valve regurgitation     CECILIA (obstructive sleep apnea) 5/11/2017     Paroxysmal atrial fibrillation (H) 5/11/2017     Paroxysmal VT (H) 5/11/2017     Rotator cuff tear arthropathy of both shoulders 11/2/2020     Uncomplicated asthma      Vitamin B12 deficiency (non anemic) 5/11/2017      Past Surgical History:   Procedure Laterality Date     ANESTHESIA CARDIOVERSION N/A 5/11/2020    Procedure: ANESTHESIA, FOR CARDIOVERSION @1100;  Surgeon: GENERIC ANESTHESIA PROVIDER;  Location: [facility] OR     CV RIGHT HEART CATH N/A 7/24/2019    Procedure: CV RIGHT HEART CATH;  Surgeon: Renu Sears MD;  Location:  HEART CARDIAC CATH LAB     CV RIGHT HEART  CATH N/A 8/5/2020    Procedure: CV RIGHT HEART CATH;  Surgeon: Nicola Seth MD;  Location:  HEART CARDIAC CATH LAB     GI SURGERY  2003    [facility]     INSERT VENTRICULAR ASSIST DEVICE LEFT (HEARTMATE II) N/A 6/19/2017    Procedure: INSERT VENTRICULAR ASSIST DEVICE LEFT (HEARTMATE II);  Median Sternotomy Heartmate II Left Ventricular Assist Device Insertion on Pump Oxygenator;  Surgeon: Ronnie Quigley MD;  Location: [facility]     ORTHOPEDIC SURGERY  1994    right knee wired     PICC DOUBLE LUMEN PLACEMENT Right 09/23/2020    5FR PICC DL. Length-43cm (1cm out).        REVIEW OF SYSTEMS:  Just got of hospital for CHF.  Treatment with diuretics.  CONSTITUTIONAL:  NEGATIVE for fever, chills, change in weight  INTEGUMENTARY/SKIN:  NEGATIVE for worrisome rashes, moles or lesions  EYES:  NEGATIVE for vision changes or irritation  ENT/MOUTH:  NEGATIVE for ear, mouth and throat problems  RESP:  NEGATIVE for significant cough or SOB and breathing is better since diuretics.  BREAST:  NEGATIVE for masses, tenderness or discharge  CV:  NEGATIVE for chest pain, palpitations or peripheral edema  GI:  NEGATIVE for nausea, abdominal pain, heartburn, or change in bowel habits  :  Negative   MUSCULOSKELETAL:  See HPI above  NEURO:  See HPI above  ENDOCRINE:  NEGATIVE for temperature intolerance, skin/hair changes  HEME/ALLERGY/IMMUNE:  NEGATIVE for bleeding problems  PSYCHIATRIC:  NEGATIVE for changes in mood or affect    EXAM:  GENERAL APPEARANCE: healthy, alert and no distress   GAIT: NORMAL  PSYCH:  mentation appears normal and affect normal/bright  SKIN: no suspicious lesions or rashes  NEURO: reflexes normal in upper extremities.   Vascular: Good capp refill and pulses  RESP: No increased work of breathing  LYMPH:  No lymphedema    MSK/Neuro:   strength: normal,   positive  thenar fasciculations.  Thenar atrophy:Severe.   Sensation diminished in  Radial 4 digits,     Range of Motion wrist, digits: All Normal  Special tests:  Tinel's positive at right cubital, Phalen's negative.    EMG:   10/24/17:        ASSESSMENT/PLAN  Severe bilateral Carpal tunnel syndrome   Guyon's canal syndrome right wrist  Cubital tunnel syndrome left elbow.  Ulnar nerve symptoms are minial     We talked about the options, which included repeat EMG, activity modification, night splinting, therapy, corticosteroid injection, medrol dose pack, and carpal tunnel release.      We decided to proceed with right Carpal tunnel release, since it's worst and he is left handed.  MAC anesthesia preferred vs WALANT .  We need to find out if he can have this procedure at the surgery center.  A block was discussed as well.  Usually, anesthesia administers these blocks with some sedation, so that is not particularly an advantage over a MAC.  A [place] block is an option as well but there is always the danger of the arrhythmogenic effects of the local anesthetic.    We discussed the procedure of open carpal tunnel release. We discussed the risks, which include but are not limited to incisional tenderness/pillar pain, infection, minor or major neurovascular injury, tendon laceration, finger stiffness and failure to relieve symptoms.  We also discussed the alternatives, including nonsurgical options, and alternatives surgical options such as minimally invasive carpal tunnel release.  We discussed the pros and cons of open carpal tunnel release versus minimally invasive carpal tunnel release.      He prefers a [place] facility for his procedure, so if unable to do this at the [facility], then will have to be done at the  with the hand service.    HIGINIO Whelan MD  Dept. Orthopedic Surgery  [facility]

## 2021-01-07 ENCOUNTER — HOSPITAL ENCOUNTER (INPATIENT)
Facility: CLINIC | Age: 66
LOS: 4 days | Discharge: HOME | DRG: 286 | End: 2021-01-11
Attending: INTERNAL MEDICINE | Admitting: INTERNAL MEDICINE
Payer: COMMERCIAL

## 2021-01-07 ENCOUNTER — APPOINTMENT (OUTPATIENT)
Dept: MEDSURG UNIT | Facility: CLINIC | Age: 66
DRG: 286 | End: 2021-01-07
Attending: INTERNAL MEDICINE
Payer: COMMERCIAL

## 2021-01-07 ENCOUNTER — APPOINTMENT (OUTPATIENT)
Dept: LAB | Facility: CLINIC | Age: 66
DRG: 286 | End: 2021-01-07
Attending: INTERNAL MEDICINE
Payer: COMMERCIAL

## 2021-01-07 ENCOUNTER — APPOINTMENT (OUTPATIENT)
Dept: GENERAL RADIOLOGY | Facility: CLINIC | Age: 66
DRG: 286 | End: 2021-01-07
Attending: INTERNAL MEDICINE
Payer: COMMERCIAL

## 2021-01-07 DIAGNOSIS — Z95.811 LVAD (LEFT VENTRICULAR ASSIST DEVICE) PRESENT (H): ICD-10-CM

## 2021-01-07 DIAGNOSIS — I50.22 CHRONIC SYSTOLIC CONGESTIVE HEART FAILURE (H): ICD-10-CM

## 2021-01-07 LAB
ALBUMIN SERPL-MCNC: 4.1 G/DL (ref 3.4–5)
ALP SERPL-CCNC: 121 U/L (ref 40–150)
ALT SERPL W P-5'-P-CCNC: 55 U/L (ref 0–70)
ANION GAP SERPL CALCULATED.3IONS-SCNC: 10 MMOL/L (ref 3–14)
ANION GAP SERPL CALCULATED.3IONS-SCNC: 6 MMOL/L (ref 3–14)
AST SERPL W P-5'-P-CCNC: 38 U/L (ref 0–45)
BASE EXCESS BLDV CALC-SCNC: 2.9 MMOL/L
BASOPHILS # BLD AUTO: 0 10E9/L (ref 0–0.2)
BASOPHILS NFR BLD AUTO: 0.3 %
BILIRUB SERPL-MCNC: 0.6 MG/DL (ref 0.2–1.3)
BUN SERPL-MCNC: 54 MG/DL (ref 7–30)
BUN SERPL-MCNC: 61 MG/DL (ref 7–30)
CALCIUM SERPL-MCNC: 8.4 MG/DL (ref 8.5–10.1)
CALCIUM SERPL-MCNC: 9.1 MG/DL (ref 8.5–10.1)
CHLORIDE SERPL-SCNC: 103 MMOL/L (ref 94–109)
CHLORIDE SERPL-SCNC: 98 MMOL/L (ref 94–109)
CO2 SERPL-SCNC: 26 MMOL/L (ref 20–32)
CO2 SERPL-SCNC: 28 MMOL/L (ref 20–32)
CREAT SERPL-MCNC: 1.79 MG/DL (ref 0.66–1.25)
CREAT SERPL-MCNC: 2.04 MG/DL (ref 0.66–1.25)
DIFFERENTIAL METHOD BLD: ABNORMAL
EOSINOPHIL # BLD AUTO: 0 10E9/L (ref 0–0.7)
EOSINOPHIL NFR BLD AUTO: 0.3 %
ERYTHROCYTE [DISTWIDTH] IN BLOOD BY AUTOMATED COUNT: 18.7 % (ref 10–15)
GFR SERPL CREATININE-BSD FRML MDRD: 34 ML/MIN/{1.73_M2}
GFR SERPL CREATININE-BSD FRML MDRD: 39 ML/MIN/{1.73_M2}
GLUCOSE BLDC GLUCOMTR-MCNC: 112 MG/DL (ref 70–99)
GLUCOSE BLDC GLUCOMTR-MCNC: 166 MG/DL (ref 70–99)
GLUCOSE BLDC GLUCOMTR-MCNC: 188 MG/DL (ref 70–99)
GLUCOSE BLDC GLUCOMTR-MCNC: 94 MG/DL (ref 70–99)
GLUCOSE BLDC GLUCOMTR-MCNC: 99 MG/DL (ref 70–99)
GLUCOSE SERPL-MCNC: 136 MG/DL (ref 70–99)
GLUCOSE SERPL-MCNC: 169 MG/DL (ref 70–99)
HBA1C MFR BLD: 6.2 % (ref 0–5.6)
HCO3 BLDV-SCNC: 29 MMOL/L (ref 21–28)
HCT VFR BLD AUTO: 33.7 % (ref 40–53)
HGB BLD-MCNC: 10.2 G/DL (ref 13.3–17.7)
HGB BLD-MCNC: 9.8 G/DL (ref 13.3–17.7)
HGB BLD-MCNC: 9.8 G/DL (ref 13.3–17.7)
IMM GRANULOCYTES # BLD: 0.1 10E9/L (ref 0–0.4)
IMM GRANULOCYTES NFR BLD: 0.7 %
INR PPP: 2.88 (ref 0.86–1.14)
LDH SERPL L TO P-CCNC: 398 U/L (ref 85–227)
LYMPHOCYTES # BLD AUTO: 1.5 10E9/L (ref 0.8–5.3)
LYMPHOCYTES NFR BLD AUTO: 19.6 %
MCH RBC QN AUTO: 25.6 PG (ref 26.5–33)
MCHC RBC AUTO-ENTMCNC: 30.3 G/DL (ref 31.5–36.5)
MCV RBC AUTO: 85 FL (ref 78–100)
MONOCYTES # BLD AUTO: 0.7 10E9/L (ref 0–1.3)
MONOCYTES NFR BLD AUTO: 9.2 %
NEUTROPHILS # BLD AUTO: 5.2 10E9/L (ref 1.6–8.3)
NEUTROPHILS NFR BLD AUTO: 69.9 %
NRBC # BLD AUTO: 0 10*3/UL
NRBC BLD AUTO-RTO: 0 /100
O2/TOTAL GAS SETTING VFR VENT: 21 %
OXYHGB MFR BLD: 96 % (ref 92–100)
OXYHGB MFR BLDV: 46 %
OXYHGB MFR BLDV: 55 %
PCO2 BLDV: 49 MM HG (ref 40–50)
PH BLDV: 7.38 PH (ref 7.32–7.43)
PLATELET # BLD AUTO: 219 10E9/L (ref 150–450)
PO2 BLDV: 29 MM HG (ref 25–47)
POTASSIUM SERPL-SCNC: 4.2 MMOL/L (ref 3.4–5.3)
POTASSIUM SERPL-SCNC: 4.2 MMOL/L (ref 3.4–5.3)
PROT SERPL-MCNC: 7.1 G/DL (ref 6.8–8.8)
RBC # BLD AUTO: 3.98 10E12/L (ref 4.4–5.9)
SODIUM SERPL-SCNC: 134 MMOL/L (ref 133–144)
SODIUM SERPL-SCNC: 136 MMOL/L (ref 133–144)
WBC # BLD AUTO: 7.5 10E9/L (ref 4–11)

## 2021-01-07 PROCEDURE — 85610 PROTHROMBIN TIME: CPT | Performed by: INTERNAL MEDICINE

## 2021-01-07 PROCEDURE — 82805 BLOOD GASES W/O2 SATURATION: CPT | Performed by: STUDENT IN AN ORGANIZED HEALTH CARE EDUCATION/TRAINING PROGRAM

## 2021-01-07 PROCEDURE — 250N000013 HC RX MED GY IP 250 OP 250 PS 637: Performed by: INTERNAL MEDICINE

## 2021-01-07 PROCEDURE — 999N000065 XR CHEST PORT 1 VW

## 2021-01-07 PROCEDURE — 83036 HEMOGLOBIN GLYCOSYLATED A1C: CPT | Performed by: INTERNAL MEDICINE

## 2021-01-07 PROCEDURE — 99291 CRITICAL CARE FIRST HOUR: CPT | Mod: GC | Performed by: INTERNAL MEDICINE

## 2021-01-07 PROCEDURE — 272N000001 HC OR GENERAL SUPPLY STERILE: Performed by: INTERNAL MEDICINE

## 2021-01-07 PROCEDURE — 250N000009 HC RX 250: Performed by: INTERNAL MEDICINE

## 2021-01-07 PROCEDURE — 999N000132 HC STATISTIC PP CARE STAGE 1

## 2021-01-07 PROCEDURE — 93451 RIGHT HEART CATH: CPT | Mod: 26 | Performed by: INTERNAL MEDICINE

## 2021-01-07 PROCEDURE — 80053 COMPREHEN METABOLIC PANEL: CPT | Performed by: INTERNAL MEDICINE

## 2021-01-07 PROCEDURE — 250N000011 HC RX IP 250 OP 636: Performed by: STUDENT IN AN ORGANIZED HEALTH CARE EDUCATION/TRAINING PROGRAM

## 2021-01-07 PROCEDURE — 999N000045 HC STATISTIC DAILY SWAN MONITORING

## 2021-01-07 PROCEDURE — 4A023N6 MEASUREMENT OF CARDIAC SAMPLING AND PRESSURE, RIGHT HEART, PERCUTANEOUS APPROACH: ICD-10-PCS | Performed by: INTERNAL MEDICINE

## 2021-01-07 PROCEDURE — 4A1239Z MONITORING OF CARDIAC OUTPUT, PERCUTANEOUS APPROACH: ICD-10-PCS | Performed by: INTERNAL MEDICINE

## 2021-01-07 PROCEDURE — 80048 BASIC METABOLIC PNL TOTAL CA: CPT | Performed by: INTERNAL MEDICINE

## 2021-01-07 PROCEDURE — 93451 RIGHT HEART CATH: CPT | Performed by: INTERNAL MEDICINE

## 2021-01-07 PROCEDURE — 250N000009 HC RX 250: Performed by: STUDENT IN AN ORGANIZED HEALTH CARE EDUCATION/TRAINING PROGRAM

## 2021-01-07 PROCEDURE — 85025 COMPLETE CBC W/AUTO DIFF WBC: CPT | Performed by: INTERNAL MEDICINE

## 2021-01-07 PROCEDURE — 200N000002 HC R&B ICU UMMC

## 2021-01-07 PROCEDURE — 250N000012 HC RX MED GY IP 250 OP 636 PS 637: Performed by: STUDENT IN AN ORGANIZED HEALTH CARE EDUCATION/TRAINING PROGRAM

## 2021-01-07 PROCEDURE — 82810 BLOOD GASES O2 SAT ONLY: CPT

## 2021-01-07 PROCEDURE — 999N001017 HC STATISTIC GLUCOSE BY METER IP

## 2021-01-07 PROCEDURE — 36415 COLL VENOUS BLD VENIPUNCTURE: CPT | Performed by: INTERNAL MEDICINE

## 2021-01-07 PROCEDURE — 999N000155 HC STATISTIC RAPCV CVP MONITORING

## 2021-01-07 PROCEDURE — 250N000012 HC RX MED GY IP 250 OP 636 PS 637: Performed by: INTERNAL MEDICINE

## 2021-01-07 PROCEDURE — 02HQ32Z INSERTION OF MONITORING DEVICE INTO RIGHT PULMONARY ARTERY, PERCUTANEOUS APPROACH: ICD-10-PCS | Performed by: INTERNAL MEDICINE

## 2021-01-07 PROCEDURE — 71045 X-RAY EXAM CHEST 1 VIEW: CPT | Mod: 26 | Performed by: RADIOLOGY

## 2021-01-07 PROCEDURE — 999N000157 HC STATISTIC RCP TIME EA 10 MIN

## 2021-01-07 PROCEDURE — 85018 HEMOGLOBIN: CPT

## 2021-01-07 PROCEDURE — 83615 LACTATE (LD) (LDH) ENZYME: CPT | Performed by: INTERNAL MEDICINE

## 2021-01-07 PROCEDURE — 250N000013 HC RX MED GY IP 250 OP 250 PS 637: Performed by: STUDENT IN AN ORGANIZED HEALTH CARE EDUCATION/TRAINING PROGRAM

## 2021-01-07 RX ORDER — ALLOPURINOL 300 MG/1
300 TABLET ORAL DAILY
Status: DISCONTINUED | OUTPATIENT
Start: 2021-01-07 | End: 2021-01-11 | Stop reason: HOSPADM

## 2021-01-07 RX ORDER — MONTELUKAST SODIUM 10 MG/1
10 TABLET ORAL AT BEDTIME
Status: DISCONTINUED | OUTPATIENT
Start: 2021-01-07 | End: 2021-01-11 | Stop reason: HOSPADM

## 2021-01-07 RX ORDER — DEXTROSE MONOHYDRATE 25 G/50ML
25-50 INJECTION, SOLUTION INTRAVENOUS
Status: DISCONTINUED | OUTPATIENT
Start: 2021-01-07 | End: 2021-01-07

## 2021-01-07 RX ORDER — ALBUTEROL SULFATE 90 UG/1
2 AEROSOL, METERED RESPIRATORY (INHALATION) EVERY 4 HOURS PRN
Status: DISCONTINUED | OUTPATIENT
Start: 2021-01-07 | End: 2021-01-11 | Stop reason: HOSPADM

## 2021-01-07 RX ORDER — PANTOPRAZOLE SODIUM 40 MG/1
40 TABLET, DELAYED RELEASE ORAL EVERY MORNING
Status: DISCONTINUED | OUTPATIENT
Start: 2021-01-07 | End: 2021-01-11 | Stop reason: HOSPADM

## 2021-01-07 RX ORDER — NALOXONE HYDROCHLORIDE 0.4 MG/ML
0.4 INJECTION, SOLUTION INTRAMUSCULAR; INTRAVENOUS; SUBCUTANEOUS
Status: DISCONTINUED | OUTPATIENT
Start: 2021-01-07 | End: 2021-01-11 | Stop reason: HOSPADM

## 2021-01-07 RX ORDER — ACETAMINOPHEN 325 MG/1
650 TABLET ORAL EVERY 6 HOURS PRN
Status: DISCONTINUED | OUTPATIENT
Start: 2021-01-07 | End: 2021-01-11 | Stop reason: HOSPADM

## 2021-01-07 RX ORDER — NICOTINE POLACRILEX 4 MG
15-30 LOZENGE BUCCAL
Status: DISCONTINUED | OUTPATIENT
Start: 2021-01-07 | End: 2021-01-07

## 2021-01-07 RX ORDER — BUPROPION HYDROCHLORIDE 150 MG/1
150 TABLET ORAL EVERY MORNING
Status: DISCONTINUED | OUTPATIENT
Start: 2021-01-07 | End: 2021-01-11 | Stop reason: HOSPADM

## 2021-01-07 RX ORDER — NALOXONE HYDROCHLORIDE 0.4 MG/ML
0.2 INJECTION, SOLUTION INTRAMUSCULAR; INTRAVENOUS; SUBCUTANEOUS
Status: DISCONTINUED | OUTPATIENT
Start: 2021-01-07 | End: 2021-01-11 | Stop reason: HOSPADM

## 2021-01-07 RX ORDER — WARFARIN SODIUM 4 MG/1
4 TABLET ORAL
Status: COMPLETED | OUTPATIENT
Start: 2021-01-07 | End: 2021-01-07

## 2021-01-07 RX ORDER — PREDNISONE 5 MG/1
5 TABLET ORAL DAILY
Status: DISCONTINUED | OUTPATIENT
Start: 2021-01-07 | End: 2021-01-11 | Stop reason: HOSPADM

## 2021-01-07 RX ORDER — LIDOCAINE 40 MG/G
CREAM TOPICAL
Status: COMPLETED | OUTPATIENT
Start: 2021-01-07 | End: 2021-01-07

## 2021-01-07 RX ORDER — ASPIRIN 81 MG/1
81 TABLET, CHEWABLE ORAL DAILY
Status: DISCONTINUED | OUTPATIENT
Start: 2021-01-07 | End: 2021-01-11 | Stop reason: HOSPADM

## 2021-01-07 RX ORDER — POTASSIUM CHLORIDE 750 MG/1
40 TABLET, EXTENDED RELEASE ORAL 2 TIMES DAILY
Status: DISCONTINUED | OUTPATIENT
Start: 2021-01-07 | End: 2021-01-08

## 2021-01-07 RX ORDER — NICOTINE POLACRILEX 4 MG
15-30 LOZENGE BUCCAL
Status: DISCONTINUED | OUTPATIENT
Start: 2021-01-07 | End: 2021-01-11 | Stop reason: HOSPADM

## 2021-01-07 RX ORDER — ISOSORBIDE DINITRATE 5 MG/1
10 TABLET ORAL 3 TIMES DAILY
Status: DISCONTINUED | OUTPATIENT
Start: 2021-01-07 | End: 2021-01-11 | Stop reason: HOSPADM

## 2021-01-07 RX ORDER — AMIODARONE HYDROCHLORIDE 200 MG/1
400 TABLET ORAL DAILY
Status: DISCONTINUED | OUTPATIENT
Start: 2021-01-07 | End: 2021-01-11 | Stop reason: HOSPADM

## 2021-01-07 RX ORDER — BUMETANIDE 0.25 MG/ML
6 INJECTION INTRAMUSCULAR; INTRAVENOUS ONCE
Status: COMPLETED | OUTPATIENT
Start: 2021-01-07 | End: 2021-01-07

## 2021-01-07 RX ORDER — ZINC SULFATE 50(220)MG
220 CAPSULE ORAL 2 TIMES DAILY
Status: DISCONTINUED | OUTPATIENT
Start: 2021-01-07 | End: 2021-01-11 | Stop reason: HOSPADM

## 2021-01-07 RX ORDER — GABAPENTIN 300 MG/1
300 CAPSULE ORAL 3 TIMES DAILY
Status: ON HOLD | COMMUNITY
End: 2021-05-31

## 2021-01-07 RX ORDER — GABAPENTIN 300 MG/1
300 CAPSULE ORAL 3 TIMES DAILY
Status: DISCONTINUED | OUTPATIENT
Start: 2021-01-07 | End: 2021-01-08

## 2021-01-07 RX ORDER — TRAMADOL HYDROCHLORIDE 50 MG/1
50 TABLET ORAL EVERY 6 HOURS PRN
Status: DISCONTINUED | OUTPATIENT
Start: 2021-01-07 | End: 2021-01-11 | Stop reason: HOSPADM

## 2021-01-07 RX ORDER — HYDRALAZINE HYDROCHLORIDE 50 MG/1
50 TABLET, FILM COATED ORAL 3 TIMES DAILY
Status: DISCONTINUED | OUTPATIENT
Start: 2021-01-07 | End: 2021-01-07

## 2021-01-07 RX ORDER — DEXTROSE MONOHYDRATE 25 G/50ML
25-50 INJECTION, SOLUTION INTRAVENOUS
Status: DISCONTINUED | OUTPATIENT
Start: 2021-01-07 | End: 2021-01-11 | Stop reason: HOSPADM

## 2021-01-07 RX ADMIN — ISOSORBIDE DINITRATE 10 MG: 5 TABLET ORAL at 20:17

## 2021-01-07 RX ADMIN — ACETAMINOPHEN 650 MG: 325 TABLET, FILM COATED ORAL at 23:32

## 2021-01-07 RX ADMIN — ALLOPURINOL 300 MG: 300 TABLET ORAL at 12:00

## 2021-01-07 RX ADMIN — GABAPENTIN 300 MG: 300 CAPSULE ORAL at 20:17

## 2021-01-07 RX ADMIN — BUMETANIDE 6 MG: 0.25 INJECTION INTRAMUSCULAR; INTRAVENOUS at 12:56

## 2021-01-07 RX ADMIN — UMECLIDINIUM 1 PUFF: 62.5 AEROSOL, POWDER ORAL at 14:16

## 2021-01-07 RX ADMIN — LIDOCAINE: 40 CREAM TOPICAL at 07:47

## 2021-01-07 RX ADMIN — WARFARIN SODIUM 4 MG: 4 TABLET ORAL at 17:59

## 2021-01-07 RX ADMIN — POTASSIUM CHLORIDE 40 MEQ: 750 TABLET, EXTENDED RELEASE ORAL at 12:00

## 2021-01-07 RX ADMIN — AMIODARONE HYDROCHLORIDE 400 MG: 200 TABLET ORAL at 12:00

## 2021-01-07 RX ADMIN — TRAMADOL HYDROCHLORIDE 50 MG: 50 TABLET, FILM COATED ORAL at 23:32

## 2021-01-07 RX ADMIN — INSULIN ASPART 1 UNITS: 100 INJECTION, SOLUTION INTRAVENOUS; SUBCUTANEOUS at 18:04

## 2021-01-07 RX ADMIN — BUMETANIDE 1 MG/HR: 0.25 INJECTION INTRAMUSCULAR; INTRAVENOUS at 13:25

## 2021-01-07 RX ADMIN — MONTELUKAST 10 MG: 10 TABLET, FILM COATED ORAL at 21:51

## 2021-01-07 RX ADMIN — INSULIN GLARGINE 6 UNITS: 100 INJECTION, SOLUTION SUBCUTANEOUS at 21:51

## 2021-01-07 RX ADMIN — PREDNISONE 5 MG: 5 TABLET ORAL at 12:00

## 2021-01-07 RX ADMIN — POTASSIUM CHLORIDE 40 MEQ: 750 TABLET, EXTENDED RELEASE ORAL at 20:17

## 2021-01-07 RX ADMIN — TRAMADOL HYDROCHLORIDE 50 MG: 50 TABLET, FILM COATED ORAL at 12:05

## 2021-01-07 RX ADMIN — ZINC SULFATE 220 MG (50 MG) CAPSULE 220 MG: CAPSULE at 20:17

## 2021-01-07 RX ADMIN — BUPROPION HYDROCHLORIDE 150 MG: 150 TABLET, EXTENDED RELEASE ORAL at 12:00

## 2021-01-07 RX ADMIN — FLUTICASONE FUROATE AND VILANTEROL TRIFENATATE 1 PUFF: 200; 25 POWDER RESPIRATORY (INHALATION) at 13:11

## 2021-01-07 RX ADMIN — ACETAMINOPHEN 650 MG: 325 TABLET, FILM COATED ORAL at 17:19

## 2021-01-07 RX ADMIN — HYDRALAZINE HYDROCHLORIDE 50 MG: 50 TABLET, FILM COATED ORAL at 14:06

## 2021-01-07 RX ADMIN — HYDRALAZINE HYDROCHLORIDE 75 MG: 25 TABLET ORAL at 20:17

## 2021-01-07 RX ADMIN — TRAMADOL HYDROCHLORIDE 50 MG: 50 TABLET, FILM COATED ORAL at 17:59

## 2021-01-07 RX ADMIN — PANTOPRAZOLE SODIUM 40 MG: 40 TABLET, DELAYED RELEASE ORAL at 12:00

## 2021-01-07 RX ADMIN — GABAPENTIN 300 MG: 300 CAPSULE ORAL at 14:06

## 2021-01-07 RX ADMIN — ASPIRIN 81 MG CHEWABLE TABLET 81 MG: 81 TABLET CHEWABLE at 12:00

## 2021-01-07 RX ADMIN — ZINC SULFATE 220 MG (50 MG) CAPSULE 220 MG: CAPSULE at 12:00

## 2021-01-07 ASSESSMENT — ACTIVITIES OF DAILY LIVING (ADL)
ADLS_ACUITY_SCORE: 12

## 2021-01-07 ASSESSMENT — MIFFLIN-ST. JEOR: SCORE: 1807.5

## 2021-01-07 NOTE — PRE-PROCEDURE
GENERAL PRE-PROCEDURE:   Procedure:  Right Heart Cath  Date/Time:  1/7/2021 8:35 AM    Risks and benefits: Risks, benefits and alternatives were discussed    Consent given by:  Patient  Patient states understanding of procedure being performed: Yes    Patient's understanding of procedure matches consent: Yes    Procedure consent matches procedure scheduled: Yes    Expected level of sedation:  Minimal  Appropriately NPO:  Yes  ASA Class:  Class 3- Severe systemic disease, definite functional limitations  Mallampati  :  Grade 2- soft palate, base of uvula, tonsillar pillars, and portion of posterior pharyngeal wall visible  Lungs:  Lungs clear with good breath sounds bilaterally  Heart:  Normal heart sounds and rate  History & Physical reviewed:  History and physical reviewed and no updates needed  Statement of review:  I have reviewed the lab findings, diagnostic data, medications, and the plan for sedation

## 2021-01-07 NOTE — IP AVS SNAPSHOT
MRN:[id number]                      After Visit Summary   1/7/2021    Jim Willingham    MRN: [id number]           Visit Information        Department      1/7/2021  7:07 AM LTAC, located within [facility] [address] [place]          Review of your medicines      UNREVIEWED medicines. 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, unspecified laterality      Dose: 300 mg  Take 1 tablet (300 mg) by mouth daily  Quantity: 90 tablet  Refills: 1     amiodarone 200 MG tablet  Commonly known as: PACERONE      Dose: 400 mg  Take 400 mg by mouth daily  Refills: 0     aspirin 81 MG chewable tablet  Commonly known as: ASA  Used for: LVAD (left ventricular assist device) present (H)      Dose: 81 mg  1 tablet (81 mg) by Oral or Feeding Tube route daily  Quantity: 36 tablet  Refills: 0     Breo Ellipta 200-25 MCG/INH inhaler  Generic drug: fluticasone-vilanterol      Dose: 1 puff  Inhale 1 puff into the lungs daily  Refills: 0     bumetanide 2 MG tablet  Commonly known as: BUMEX  Used for: Chronic systolic congestive heart failure (H)      Take 4mg every morning. Take 4mg in the afternoon on M, W, F. Take 2mg in the afternoon on Tu, Th, Sat and Sun.  Quantity: 540 tablet  Refills: 3     buPROPion 150 MG 24 hr tablet  Commonly known as: WELLBUTRIN XL      Dose: 150 mg  Take 1 tablet (150 mg) by mouth every morning  Quantity: 60 tablet  Refills: 1     cyanocobalamin 1000 MCG/ML injection  Commonly known as: CYANOCOBALAMIN      Dose: 1,000 mcg  Inject 1,000 mcg into the muscle every 30 days  Refills: 0     enoxaparin ANTICOAGULANT 60 MG/0.6ML syringe  Commonly known as: LOVENOX  Used for: Chronic systolic congestive heart  failure (H), LVAD (left ventricular assist device) present (H), Long term current use of anticoagulant therapy      Dose: 60 mg  Inject 0.6 mLs (60 mg) Subcutaneous 2 times daily  Quantity: 10 Syringe  Refills: 1     gabapentin 300 MG capsule  Commonly known as: NEURONTIN  Used for: 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, without long-term current use of insulin, unspecified CKD stage (H)      Dose: 600 mg  Take 2 capsules (600 mg) by mouth At Bedtime  Quantity: 30 capsule  Refills: 3     hydrALAZINE 50 MG tablet  Commonly known as: APRESOLINE  Used for: Chronic systolic congestive heart failure (H), LVAD (left ventricular assist device) present (H)      Dose: 50 mg  Take 1 tablet (50 mg) by mouth 3 times daily  Quantity: 270 tablet  Refills: 3     Incruse Ellipta 62.5 MCG/INH inhaler  Generic drug: umeclidinium      Dose: 1 puff  Inhale 1 puff into the lungs daily  Refills: 0     insulin glargine 100 UNIT/ML pen  Commonly known as: Lantus SoloStar  Used for: Type 2 diabetes mellitus with hyperglycemia, with long-term current use of insulin (H)      Dose: 6 Units  Inject 6 Units Subcutaneous At Bedtime  Quantity: 9 mL  Refills: 3     insulin lispro 100 UNIT/ML (1 unit dial) KWIKPEN  Commonly known as: HumaLOG KWIKPEN  Used for: Type 2 diabetes mellitus with diabetic autonomic neuropathy, without long-term current use of insulin (H)      Dose: 1-7 Units  Inject 1-7 Units Subcutaneous 4 times daily  Quantity: 3 mL  Refills: 1     metolazone 2.5 MG tablet  Commonly known as: ZAROXOLYN  Used for: Chronic systolic congestive heart failure (H), LVAD (left ventricular assist device) present (H)      Dose: 2.5 mg  Take 1 tablet (2.5 mg) by mouth as needed (take ONLY as directed by VAD team)  Quantity: 5 tablet  Refills: 0     pantoprazole 40 MG EC tablet  Commonly known as: PROTONIX  Used for: Gastroesophageal reflux disease without esophagitis      Dose: 40 mg  Take 1 tablet (40 mg) by  mouth every morning  Quantity: 60 tablet  Refills: 5     potassium chloride ER 10 MEQ CR tablet  Commonly known as: KLOR-CON M  Used for: Chronic systolic congestive heart failure (H)      Take 40mEq in the AM. Take 40mEq in the afternoons on M, W, F. Take 20mEq in the afternoons on Tu, Th, Sat, Sun.  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, unspecified laterality      Dose: 5 mg  Take 1 tablet (5 mg) by mouth daily  Quantity: 30 tablet  Refills: 2     ProAir  (90 Base) MCG/ACT inhaler  Generic drug: albuterol      Dose: 2 puff  Inhale 2 puffs into the lungs every 4 hours as needed for shortness of breath / dyspnea or wheezing  Refills: 0     Singulair 10 MG tablet  Generic drug: montelukast      Dose: 10 mg  Take 10 mg by mouth At Bedtime  Refills: 0     traMADol 50 MG tablet  Commonly known as: ULTRAM  Used for: Acute post-operative pain      Dose: 100 mg  Take 2 tablets (100 mg) by mouth every 6 hours as needed for moderate pain or breakthrough pain  Quantity: 28 tablet  Refills: 0     warfarin ANTICOAGULANT 5 MG tablet  Commonly known as: COUMADIN  Used for: LVAD (left ventricular assist device) present (H)      Take as directed. If you are unsure how to take this medication, talk to your nurse or doctor.  Original instructions: Take 5 mg daily or as directed by the anticoagulation clinic  Refills: 0     zinc sulfate 220 (50 Zn) MG capsule  Commonly known as: ZINCATE      Dose: 220 mg  Take 220 mg by mouth 2 times daily  Refills: 0        CONTINUE these medicines which have NOT CHANGED       Dose / Directions   blood glucose monitoring meter device kit  Used for: Type 2 diabetes mellitus with diabetic neuropathy, without long-term current use of insulin (H)      Use to test blood sugar four times daily or as directed.  Quantity: 1 kit  Refills: 0     blood glucose test  strip  Commonly known as: NO BRAND SPECIFIED  Used for: Type 2 diabetes mellitus with diabetic neuropathy, without long-term current use of insulin (H)      Use to test blood sugar four times daily or as directed.  Quantity: 200 each  Refills: 0     insulin pen needle 32G X 4 MM miscellaneous  Commonly known as: 32G X 4 MM  Used for: Type 2 diabetes mellitus with diabetic neuropathy, without long-term current use of insulin (H)      Use four pen needles daily or as directed.  Quantity: 110 each  Refills: 0              Protect others around you: Learn how to safely use, store and throw away your medicines at www.disposemymeds.org.       Follow-ups after your visit       Your next 10 appointments already scheduled    Jan 07, 2021  Procedure with Jac Dover MD  [facility] ([facility], [facility]) [address] [facility] [id number] [phone]   [facility] is located on the corner of [facility] [facility] and [facility] on the [facility]. It is easily accessible from virtually any point in the [facility] area, via [employer] and [employer].   Jan 11, 2021 10:00 AM  LAB with [facility] [place] ([facility]) [address] [id number] [phone]   Please do not eat 10-12 hours before your appointment if you are coming in fasting for labs on lipids, cholesterol, or glucose (sugar). Does not apply to pregnant women. Water, tea and black coffee (with nothing added) is okay. Do not drink other fluids, diet soda or gum. If you have concerns about taking your medications, please send a message by clicking on Secure Messaging, Message Your Care Team.     Jan 11, 2021 10:30 AM  (Arrive by 10:15 AM)  CORE RETURN with Didi Butler PA-C  [facility]) [address] [facility] [id number] [phone]      Jan 12, 2021  9:15 AM  New Visit with Dann Whelan MD  [facility] ([facility]) [address] [place] [phone]   Please arrive 15 minutes prior to your scheduled appointment time to fill out any necessary paperwork.  If you have [employer], you can complete questionnaires, register insurance information and make a copay via e-checkin.  After completing e-checkin, you will still need to stop at the  prior to the appointment but the process will be faster.  Other important items needed for your visit are:    -Insurance card, ID and copay if you did not complete this in [employer]  -Any paperwork (FMLA, WC, sports physical forms, etc.)  -Facilitate the records process by requesting records from your previous  providers or bring them to your appointment  -List of all medications you are taking (include prescriptions, over-the-counter, and herbal)    The address of your appointment is [address] [place], [place] [address]. [place] [address].  When you arrive, please park in the West entrance and check in at Check in B on level 1.     Jan 26, 2021  9:00 AM  Office Visit with Ricardo Gómez MD  [facility] ([facility]) [address] [place] [phone]   Bring a current list of meds and any records pertaining to this visit.  For Physicals, please bring immunization records and any forms needing to be filled out. Please arrive 10 minutes early to complete paperwork.     Feb 05, 2021  9:00 AM  LAB with  LAB  [facility] ([facility]) [address] [facility]  [address] [facility] [phone] [phone]   Please do not eat 10-12 hours before your appointment if you are coming in fasting for labs on lipids, cholesterol, or glucose (sugar). Does not apply to  pregnant women. Water, tea and black coffee (with nothing added) is okay. Do not drink other fluids, diet soda or gum. If you have concerns about taking your medications, please send a message by clicking on Secure Messaging, Message Your Care Team.     Feb 05, 2021  9:30 AM  (Arrive by 9:15 AM)  Ventricular Assist Device with Delisa Montgomery MD  [facility] ([facility]) [address] [place] [id number] [phone]      Mar 18, 2021 12:00 AM  CARDIAC DEVICE CHECK - REMOTE with  ICD REMOTE  [facility] ([facility]) [address] [place] [address]  [address]  [facility] [address] [phone]         Care Instructions       Further instructions from your care team       [facility]                        Interventional Cardiology  Discharge Instructions   Post Right Heart Cath      AFTER YOU GO HOME:    DO drink plenty of fluids    DO resume your regular diet and medications unless otherwise instructed by your Primary Physician    Do Not scrub the procedure site vigorously    No lotion or powder to the puncture site for 3 days    CALL YOUR PRIMARY PHYSICIAN IF: You may resume all normal activity.  Monitor neck site for bleeding, swelling, or voice changes. If you notice bleeding or swelling immediately apply pressure to the site and call number below to speak with Cardiology Fellow.  If you experience any changes in your breathing you should call your doctor immediately or come to the closest Emergency Department.  Do not drive yourself.    ADDITIONAL INSTRUCTIONS: Medications: You are to resume all home medications including anticoagulation therapy unless otherwise advised by your primary cardiologist or nurse coordinator.    Follow Up: Per your primary cardiology team    If you have any questions or concerns regarding your procedure site please call [phone] at anytime and  ask for Cardiology Fellow on call.  They are available 24 hours a day.  You may also contact the Cardiology Clinic after hours number at [phone].                                                       Telephone Numbers [phone] Monday-Friday 8:00 am to 4:30 pm    [phone] After 4:30 pm Monday-Friday, Weekends & Holidays  Ask for Interventional Cardiologist on call. Someone is on call 24 hours/day   [facility] toll free number [phone] Monday-Friday 8:00 am to 4:30 pm   [facility] Emergency Dept [phone]                   Additional Information About Your Visit       MyChart Information    [employer] gives you secure access to your electronic health record. If you see a primary care provider, you can also send messages to your care team and make appointments. If you have questions, please call your primary care clinic.  If you do not have a primary care provider, please call [phone] and they will assist you.       Care EveryWhere ID    This is your Care EveryWhere ID. This could be used by other organizations to access your [facility] medical records  [id number]       Your Vitals Were  Most recent update: 1/7/2021  7:32 AM    Blood Pressure   105/86 (BP Location: Right arm)    Pulse   70    Temperature   97.9  F (36.6  C) (Oral)    Respirations   16    Pulse Oximetry   97%          Primary Care Provider Office Phone # Fax #    Ricardo IRINA Gómez [phone]      Equal Access to Services    GUERDA [facility] Hospital[facility]FFY AH: Hadii aad ku hadasho Soomaali, waaxda luqadaha, qaybta kaalmada adeegyada, kendra ely. So [facility] [phone].    ATENCIÓN: Si habla español, tiene a roger disposición servicios gratuitos de asistencia lingüística. Llame al [phone].    We comply with applicable federal and state civil rights laws, including the [place] Human Rights Act. We do not discriminate on the basis of race, color, creed, Buddhism, national origin, marital status, age,  disability, sex, sexual orientation, or gender identity.    If you would like an itemization of your charges they will now be available in [employer] 30 days after discharge. To access the itemized statements in [employer] go to billing/billing summary. From there select view account. There will be multiple tabs showing an overview of your account, detail, payments, and communications. From the communications tab you can see your monthly statements, your itemized statements, and any billing letters generated for your account. If you do not have a [employer] account and need help getting access please contact [employer] support at [phone].  If you would prefer to have your itemized statements mailed please contact our automated itemized bill request line at [phone] option  2.       Thank you!    Thank you for choosing [facility] for your care. Our goal is always to provide you with excellent care. Hearing back from our patients is one way we can continue to improve our services. Please take a few minutes to complete the written survey that you may receive in the mail after you visit with us. Thank you!            Medication List      Medications          Morning Afternoon Evening Bedtime As Needed    blood glucose monitoring meter device kit  INSTRUCTIONS: Use to test blood sugar four times daily or as directed.                     blood glucose test strip  Also known as: NO BRAND SPECIFIED  INSTRUCTIONS: Use to test blood sugar four times daily or as directed.                     insulin pen needle 32G X 4 MM miscellaneous  Also known as: 32G X 4 MM  INSTRUCTIONS: Use four pen needles daily or as directed.                       ASK your doctor about these medications          Morning Afternoon Evening Bedtime As Needed    acetaminophen 325 MG tablet  Also known as: TYLENOL  INSTRUCTIONS: Take 650 mg by mouth every 6 hours as needed for mild pain                     allopurinol 300 MG tablet  Also known as:  ZYLOPRIM  INSTRUCTIONS: Take 1 tablet (300 mg) by mouth daily                     amiodarone 200 MG tablet  Also known as: PACERONE  INSTRUCTIONS: Take 400 mg by mouth daily                     aspirin 81 MG chewable tablet  Also known as: ASA  INSTRUCTIONS: 1 tablet (81 mg) by Oral or Feeding Tube route daily                     Breo Ellipta 200-25 MCG/INH inhaler  INSTRUCTIONS: Inhale 1 puff into the lungs daily  Generic drug: fluticasone-vilanterol                     bumetanide 2 MG tablet  Also known as: BUMEX  INSTRUCTIONS: Take 4mg every morning. Take 4mg in the afternoon on M, W, F. Take 2mg in the afternoon on Tu, Th, Sat and Sun.                     buPROPion 150 MG 24 hr tablet  Also known as: WELLBUTRIN XL  INSTRUCTIONS: Take 1 tablet (150 mg) by mouth every morning                     cyanocobalamin 1000 MCG/ML injection  Also known as: CYANOCOBALAMIN  INSTRUCTIONS: Inject 1,000 mcg into the muscle every 30 days                     enoxaparin ANTICOAGULANT 60 MG/0.6ML syringe  Also known as: LOVENOX  INSTRUCTIONS: Inject 0.6 mLs (60 mg) Subcutaneous 2 times daily  Doctor's comments: Per anticoagulation protocol with Dr. Delisa Montgomery/Radha Pedro, RN                     gabapentin 300 MG capsule  Also known as: NEURONTIN  INSTRUCTIONS: Take 2 capsules (600 mg) by mouth At Bedtime                     hydrALAZINE 50 MG tablet  Also known as: APRESOLINE  INSTRUCTIONS: Take 1 tablet (50 mg) by mouth 3 times daily                     Incruse Ellipta 62.5 MCG/INH inhaler  INSTRUCTIONS: Inhale 1 puff into the lungs daily  Generic drug: umeclidinium                     insulin glargine 100 UNIT/ML pen  Also known as: Lantus SoloStar  INSTRUCTIONS: Inject 6 Units Subcutaneous At Bedtime  Doctor's comments: If Lantus is not covered by insurance, may substitute Basaglar at same dose and frequency.                       insulin lispro 100 UNIT/ML (1 unit dial) KWIKPEN  Also known as: HumaLOG  KWIKPEN  INSTRUCTIONS: Inject 1-7 Units Subcutaneous 4 times daily                     metolazone 2.5 MG tablet  Also known as: ZAROXOLYN  INSTRUCTIONS: Take 1 tablet (2.5 mg) by mouth as needed (take ONLY as directed by VAD team)                     pantoprazole 40 MG EC tablet  Also known as: PROTONIX  INSTRUCTIONS: Take 1 tablet (40 mg) by mouth every morning                     potassium chloride ER 10 MEQ CR tablet  Also known as: KLOR-CON M  INSTRUCTIONS: Take 40mEq in the AM. Take 40mEq in the afternoons on M, W, F. Take 20mEq in the afternoons on Tu, Th, Sat, Sun.                     predniSONE 5 MG tablet  Also known as: DELTASONE  INSTRUCTIONS: Take 1 tablet (5 mg) by mouth daily                     ProAir  (90 Base) MCG/ACT inhaler  INSTRUCTIONS: Inhale 2 puffs into the lungs every 4 hours as needed for shortness of breath / dyspnea or wheezing  Generic drug: albuterol                     Singulair 10 MG tablet  INSTRUCTIONS: Take 10 mg by mouth At Bedtime  Generic drug: montelukast                     traMADol 50 MG tablet  Also known as: ULTRAM  INSTRUCTIONS: Take 2 tablets (100 mg) by mouth every 6 hours as needed for moderate pain or breakthrough pain                     warfarin ANTICOAGULANT 5 MG tablet  Also known as: COUMADIN  Take as directed. If you are unsure how to take this medication, talk to your nurse or doctor.  Original instructions: Take 5 mg daily or as directed by the anticoagulation clinic                     zinc sulfate 220 (50 Zn) MG capsule  Also known as: ZINCATE  INSTRUCTIONS: Take 220 mg by mouth 2 times daily

## 2021-01-07 NOTE — H&P
[facility] History and Physical    Jim Willingham MRN# [id number]   Age: 65 year old YOB: 1955     Date of Admission:  1/7/2021          Assessment and Plan:   This patient is a 65 year old with PMH of NICM (EF 20%) s/p HM II LVAD placement (6/19/17) at [facility] (obesity) metronic dual chamber ICD. Other relavant history includes atrial fibrillation, CKD stage III, DM type 2, RV failure, CECILIA, obesity, hypertension, COPD, asthma, here with acute on chronic systolic heart failure, admitted for RHC and heart failure treatment.     #NICM (EF 20%) s/p HM II LVAD placement (6/19/17) at [facility] (obesity)  #Hypervolemic status   MAP~. Current CVP 18 on lying position. Slightly volume up on physical exam. Current LVAD setting: flow 6, speed 9400, pulse index 5.0, pump power 6.2. (baseline).   - RHC today with retained swan 1/7: mRA 17, RV(EDP) 18, mPA 30, mPCWP 25, CI 2.5(decreased output).   - Echo ordered for reevaluation  - continue diuretic with bumex 6 mg IV*1 dose, then 1 mg/hr  - consider optimization of speed if euvolemic status obtained  - continue LVAD at the current setting  - CVP q12h, PCWP daily  - CXR daily  - BMP q12h   - heart failure management    Afterload reduction: increase hydralazine to 75 TID, add isosorbide dinitrate 10 mg TID;              MAP goal: 80 mmHg   CCB: on amiodarone 400 mg daily, not on BB   Diuretic: continue bumex drip 1 mg/hr + KCL ER 40 mEq BID   Antiplatelet: ASA, Anticoagulation: Warfarin   SCD: Metronic dual chamber ICD     #WALTER on CKD  Current Cr. 2.06. Has been trending up and down in the past 6 months. Cardiorenal vs prerenal from diuresis.  - trend BMP  - monitor I/O    #H/o atrial fibrillation on warfarin  #H/o VT/VF on amiodarone  On coumadin. Current INR 2.88. Warfarin held prior to RHC.  - Resume warfarin after RHC  - Pharmacy to dose warfarin  - continue amiodarone 400 mg daily    Chronic conditions  DM type 2: glargine 6 unit, Medium dose insuline  sliding scale  Hypertension: Hydralazine 50 mg TID  COPD/Asthma:  Scheduled Breo Ellipta. Continue albuterol orn  CECILIA: continue CPAP during bedtime  Psychiatric condition: Bupropion    Diet: moderate carb consistent  Fluid: restriction 1.8L/day  DVT prophylaxis: on warfarin   Code status: full  Disposition: discharge to home in 2-3 days after euvolemic status is achieved and LVAD speed is optimized.    Patient staffed with Dr. Sears.    Ml Hull MD  Internal Medicine, PGY-1         Chief Complaint:   Acute on chronic systolic heart failure     History is obtained from the patient          History of Present Illness (Resident / Clinician):   This patient is a 65 year old with PMH of NICM (EF 20%) s/p HM II LVAD placement (6/19/17) at [facility] (obesity) with metronic dual chamber ICD. Other relevant history includes atrial fibrillation, CKD stage III, DM type 2 c/b europathy, RV failure, CECILIA on CPAP, obesity, hypertension, COPD, asthma, here for RHC and establish volume management plan.    His transplant evaluation is ongoing. Was previously admitted 9/19-27 with Staph hominis bacteremia and had completed 6 weeks of IV vancomycin 10/30; he has had no recurrence of symptoms. He has been feeling more fatigue and weaker and less active compared to prior to hospitalization. His daily activity includes walking around 3000 steps/day.    In the past couple of months there's been some difficulties with his volume management. His diuretic regimen has been titrated up and down and currently on Bumex 4 mg BID every MWF, and also take 2 mg in the afternoon on the other days in addition to metolazone 2.5 mg daily which was started 2 weeks ago. No changes in his urine color or characteristics. Previous right heart cath was unrevealing given he was completely dry.     Other recent issues:   1. Atrial fibrillation starting earlier this year. Was seen by EP for pacemaker setting adjustment and eventually got  cardioverted and is currently on amiodarone. He did have symptomatic improvement with this.   2. He got another ICD shock on 12/11 which was at a high rate above 220 bpm V fib arrest. He felt a little lousy but otherwise denied any complaint. His creatinine by that time went up to 2.16 compared to 1.5 in 11/19.    Current Medications  ASA 81 mg daily  Coumadin 5 mg daily  Bumex 4/4 MWF, 4/2 Tu/Th/Sat/Sun.  Metolazone 2.5 mg tablet  Amiodarone 400 mg once a day for VT/VF in June 2020.  Hydralazine 50 TID  KCl 40/40 MWF, 40/20 on Tu/Th/Sat/Sun.          Past Medical History:     Past Medical History:   Diagnosis Date     Benign essential hypertension 5/11/2017     Bilateral carpal tunnel syndrome 11/2/2020     Cardiomyopathy, unspecified (H) 5/8/2017     CKD (chronic kidney disease) stage 3, GFR 30-59 ml/min 5/11/2017     COPD (chronic obstructive pulmonary disease) (H) 11/2/2020     Depression 5/11/2017     Diabetes mellitus (H) 5/11/2017     Gouty arthropathy, chronic, without tophi 11/2/2020     H/O gastric bypass 5/11/2017     ICD (implantable cardioverter-defibrillator), biventricular, in situ 5/11/2017     LVAD (left ventricular assist device) present (H)      Major depression, recurrent, chronic (H) 11/2/2020     NICM (nonischemic cardiomyopathy) (H)/ EF 20% 5/11/2017    ECHO: LVEDd. 7.66 cm, Restrictive pattern , Severe mitral valve regurgitation     CECILIA (obstructive sleep apnea) 5/11/2017     Paroxysmal atrial fibrillation (H) 5/11/2017     Paroxysmal VT (H) 5/11/2017     Rotator cuff tear arthropathy of both shoulders 11/2/2020     Uncomplicated asthma      Vitamin B12 deficiency (non anemic) 5/11/2017             Past Surgical History:      Past Surgical History:   Procedure Laterality Date     ANESTHESIA CARDIOVERSION N/A 5/11/2020    Procedure: ANESTHESIA, FOR CARDIOVERSION @1100;  Surgeon: GENERIC ANESTHESIA PROVIDER;  Location: [facility] OR     CV RIGHT HEART CATH N/A 7/24/2019    Procedure: CV RIGHT HEART  CATH;  Surgeon: Renu Sears MD;  Location:  HEART CARDIAC CATH LAB     CV RIGHT HEART CATH N/A 2020    Procedure: CV RIGHT HEART CATH;  Surgeon: Nicola Seth MD;  Location:  HEART CARDIAC CATH LAB     GI SURGERY      Sylvester en Y     INSERT VENTRICULAR ASSIST DEVICE LEFT (HEARTMATE II) N/A 2017    Procedure: INSERT VENTRICULAR ASSIST DEVICE LEFT (HEARTMATE II);  Median Sternotomy Heartmate II Left Ventricular Assist Device Insertion on Pump Oxygenator;  Surgeon: Ronnie Quigley MD;  Location:  OR     ORTHOPEDIC SURGERY      right knee wired     PICC DOUBLE LUMEN PLACEMENT Right 2020    5FR PICC DL. Length-43cm (1cm out).             Social History:     Social History     Tobacco Use     Smoking status: Former Smoker     Quit date:      Years since quittin.0     Smokeless tobacco: Never Used   Substance Use Topics     Alcohol use: No             Family History:     Family History   Problem Relation Age of Onset     Cerebrovascular Disease Mother 64     Diabetes Mother      Hypertension Mother      Coronary Artery Disease Father      Diabetes Type 2  Father      Obesity Brother      Obesity Brother      Cerebrovascular Disease Daughter 40     Family history reviewed.         Allergies:     Allergies   Allergen Reactions     Beer Shortness Of Breath     Grass Shortness Of Breath     Ace Inhibitors Cough     Dust Mites Other (See Comments)     Asthma     Mold Other (See Comments)     Asthma     Penicillins Other (See Comments)     Unknown - childhood exposure    Tolerated Zosyn -2020     Sulfa Drugs Other (See Comments) and Unknown     Unknown childhood reaction             Medications:     Current Facility-Administered Medications   Medication     acetaminophen (TYLENOL) tablet 650 mg     albuterol (PROAIR HFA/PROVENTIL HFA/VENTOLIN HFA) 108 (90 Base) MCG/ACT inhaler 2 puff     allopurinol (ZYLOPRIM) tablet 300 mg     amiodarone (PACERONE) tablet 400 mg     aspirin  (ASA) chewable tablet 81 mg     buPROPion (WELLBUTRIN XL) 24 hr tablet 150 mg     glucose gel 15-30 g    Or     dextrose 50 % injection 25-50 mL    Or     glucagon injection 1 mg     fluticasone-vilanterol (BREO ELLIPTA) 200-25 MCG/INH inhaler 1 puff     gabapentin (NEURONTIN) capsule 300 mg     hydrALAZINE (APRESOLINE) tablet 50 mg     insulin glargine (LANTUS PEN) injection 6 Units     montelukast (SINGULAIR) tablet 10 mg     naloxone (NARCAN) injection 0.2 mg    Or     naloxone (NARCAN) injection 0.4 mg    Or     naloxone (NARCAN) injection 0.2 mg    Or     naloxone (NARCAN) injection 0.4 mg     pantoprazole (PROTONIX) EC tablet 40 mg     Patient is already receiving anticoagulation with heparin, enoxaparin (LOVENOX), warfarin (COUMADIN)  or other anticoagulant medication     potassium chloride ER (KLOR-CON M) CR tablet 40 mEq     predniSONE (DELTASONE) tablet 5 mg     traMADol (ULTRAM) tablet 50 mg     umeclidinium (INCRUSE ELLIPTA) 62.5 MCG/INH inhaler 1 puff     Warfarin Therapy Reminder (Check START DATE - warfarin may be starting in the FUTURE)     zinc sulfate (ZINCATE) capsule 220 mg             Review of Systems:   The Review of Systems is negative other than noted in the HPI           Physical Exam (Resident / Clinician):   Vitals were reviewed  Temp: 98.5  F (36.9  C) Temp src: Oral BP: 97/84 Pulse: 68   Resp: 16 SpO2: 95 % O2 Device: None (Room air)    GA: NAD  HEENT: swan on the right side  Lungs: clear on auscultation both lungs  CVS: LVAD hums, trace bilateral peripheral edema  Abd: normal bowel sounds, soft, nontender  Ext: no joint swelling or tenderness  Neuro: A&O, moving independently, otherwise grossly intact         Data:     Results for orders placed or performed during the hospital encounter of 01/07/21 (from the past 24 hour(s))   CBC with platelets differential   Result Value Ref Range    WBC 7.5 4.0 - 11.0 10e9/L    RBC Count 3.98 (L) 4.4 - 5.9 10e12/L    Hemoglobin 10.2 (L) 13.3 - 17.7  g/dL    Hematocrit 33.7 (L) 40.0 - 53.0 %    MCV 85 78 - 100 fl    MCH 25.6 (L) 26.5 - 33.0 pg    MCHC 30.3 (L) 31.5 - 36.5 g/dL    RDW 18.7 (H) 10.0 - 15.0 %    Platelet Count 219 150 - 450 10e9/L    Diff Method Automated Method     % Neutrophils 69.9 %    % Lymphocytes 19.6 %    % Monocytes 9.2 %    % Eosinophils 0.3 %    % Basophils 0.3 %    % Immature Granulocytes 0.7 %    Nucleated RBCs 0 0 /100    Absolute Neutrophil 5.2 1.6 - 8.3 10e9/L    Absolute Lymphocytes 1.5 0.8 - 5.3 10e9/L    Absolute Monocytes 0.7 0.0 - 1.3 10e9/L    Absolute Eosinophils 0.0 0.0 - 0.7 10e9/L    Absolute Basophils 0.0 0.0 - 0.2 10e9/L    Abs Immature Granulocytes 0.1 0 - 0.4 10e9/L    Absolute Nucleated RBC 0.0    Comprehensive metabolic panel   Result Value Ref Range    Sodium 134 133 - 144 mmol/L    Potassium 4.2 3.4 - 5.3 mmol/L    Chloride 98 94 - 109 mmol/L    Carbon Dioxide 26 20 - 32 mmol/L    Anion Gap 10 3 - 14 mmol/L    Glucose 136 (H) 70 - 99 mg/dL    Urea Nitrogen 61 (H) 7 - 30 mg/dL    Creatinine 2.04 (H) 0.66 - 1.25 mg/dL    GFR Estimate 34 (L) >60 mL/min/[1.73_m2]    GFR Estimate If Black 39 (L) >60 mL/min/[1.73_m2]    Calcium 9.1 8.5 - 10.1 mg/dL    Bilirubin Total 0.6 0.2 - 1.3 mg/dL    Albumin 4.1 3.4 - 5.0 g/dL    Protein Total 7.1 6.8 - 8.8 g/dL    Alkaline Phosphatase 121 40 - 150 U/L    ALT 55 0 - 70 U/L    AST 38 0 - 45 U/L   INR   Result Value Ref Range    INR 2.88 (H) 0.86 - 1.14   Lactate Dehydrogenase   Result Value Ref Range    Lactate Dehydrogenase 398 (H) 85 - 227 U/L   Hemoglobin   Result Value Ref Range    Hemoglobin 9.8 (L) 13.3 - 17.7 g/dL   Oxyhemoglobin venous   Result Value Ref Range    Oxyhemoglobin Venous 55 %   Hemoglobin   Result Value Ref Range    Hemoglobin 9.8 (L) 13.3 - 17.7 g/dL   Oxyhemoglobin   Result Value Ref Range    Oxyhemoglobin Arterial 96 92 - 100 %   Cardiac Catheterization    Narrative      Right sided filling pressures are moderately elevated.    Moderately elevated pulmonary  artery hypertension.    Left sided filling pressures are moderately elevated.    Reduced cardiac output level.     Worsening edema and dyspnea as outpatient refractory to diuresis with   elevated right sided and left sided filling pressures   Glucose by meter   Result Value Ref Range    Glucose 99 70 - 99 mg/dL   Glucose by meter   Result Value Ref Range    Glucose 112 (H) 70 - 99 mg/dL     *Note: Due to a large number of results and/or encounters for the requested time period, some results have not been displayed. A complete set of results can be found in Results Review.

## 2021-01-07 NOTE — PHARMACY-ANTICOAGULATION SERVICE
Clinical Pharmacy - Warfarin Dosing Consult     Pharmacy has been consulted to manage this patient s warfarin therapy.  Indication: Atrial Fibrillation;LVAD/RVAD(HM2)  Therapy Goal: INR 2-3  Provider/Team: Anil SHARMA Anticoag Clinic: [facility]  Warfarin Prior to Admission: Yes  Warfarin PTA Regimen: 1/5 anticoag note: 10mg every Tues, Fri; 7.5mg all other days starting today. Next INR 1/7  Significant drug interactions: no new drug-drug interactions; HF exacerbation = increase INR  Recent documented change in oral intake/nutrition: Unknown  Dose Comments: Will reduce dose from home regimen tonight in setting of heart failure    INR   Date Value Ref Range Status   01/07/2021 2.88 (H) 0.86 - 1.14 Final   01/04/2021 2.18 (H) 0.86 - 1.14 Final       Recommend warfarin 4 mg today.  Pharmacy will monitor Jim Willingham daily and order warfarin doses to achieve specified goal.      Please contact pharmacy as soon as possible if the warfarin needs to be held for a procedure or if the warfarin goals change.

## 2021-01-07 NOTE — PROGRESS NOTES
Admitted/transferred from:Cath Lab  Reason for admission/transfer: Diuresis and swan staying in   2 RN skin assessment: completed by April NELSON RN and REGINALD Morrison  Result of skin assessment and interventions/actions: no skin issues  Height, weight, drug calc weight: Done  Patient belongings (see Flowsheet)  MDRO education added to care planN/A  ?

## 2021-01-07 NOTE — DISCHARGE INSTRUCTIONS
[facility]                        Interventional Cardiology  Discharge Instructions   Post Right Heart Cath      AFTER YOU GO HOME:    DO drink plenty of fluids    DO resume your regular diet and medications unless otherwise instructed by your Primary Physician    Do Not scrub the procedure site vigorously    No lotion or powder to the puncture site for 3 days    CALL YOUR PRIMARY PHYSICIAN IF: You may resume all normal activity.  Monitor neck site for bleeding, swelling, or voice changes. If you notice bleeding or swelling immediately apply pressure to the site and call number below to speak with Cardiology Fellow.  If you experience any changes in your breathing you should call your doctor immediately or come to the closest Emergency Department.  Do not drive yourself.    ADDITIONAL INSTRUCTIONS: Medications: You are to resume all home medications including anticoagulation therapy unless otherwise advised by your primary cardiologist or nurse coordinator.    Follow Up: Per your primary cardiology team    If you have any questions or concerns regarding your procedure site please call [phone] at anytime and ask for Cardiology Fellow on call.  They are available 24 hours a day.  You may also contact the Cardiology Clinic after hours number at [phone].                                                       Telephone Numbers [phone] Monday-Friday 8:00 am to 4:30 pm    [phone] After 4:30 pm Monday-Friday, Weekends & Holidays  Ask for Interventional Cardiologist on call. Someone is on call 24 hours/day   [facility] toll free number [phone] Monday-Friday 8:00 am to 4:30 pm   [facility] Emergency Dept [phone]

## 2021-01-07 NOTE — IP AVS SNAPSHOT
[facility] [address] [address]                                    After Visit Summary   1/7/2021    Jim Willingham    MRN: [id number]           After Visit Summary Signature Page    I have received my discharge instructions, and my questions have been answered. I have discussed any challenges I see with this plan with the nurse or doctor.    ..........................................................................................................................................  Patient/Patient Representative Signature      ..........................................................................................................................................  Patient Representative Print Name and Relationship to Patient    ..................................................               ................................................  Date                                   Time    ..........................................................................................................................................  Reviewed by Signature/Title    ...................................................              ..............................................  Date                                               Time          22EPIC Rev 08/18

## 2021-01-08 ENCOUNTER — APPOINTMENT (OUTPATIENT)
Dept: GENERAL RADIOLOGY | Facility: CLINIC | Age: 66
DRG: 286 | End: 2021-01-08
Attending: INTERNAL MEDICINE
Payer: COMMERCIAL

## 2021-01-08 ENCOUNTER — APPOINTMENT (OUTPATIENT)
Dept: CARDIOLOGY | Facility: CLINIC | Age: 66
DRG: 286 | End: 2021-01-08
Attending: STUDENT IN AN ORGANIZED HEALTH CARE EDUCATION/TRAINING PROGRAM
Payer: COMMERCIAL

## 2021-01-08 LAB
ANION GAP SERPL CALCULATED.3IONS-SCNC: 7 MMOL/L (ref 3–14)
ANION GAP SERPL CALCULATED.3IONS-SCNC: 8 MMOL/L (ref 3–14)
BASE EXCESS BLDV CALC-SCNC: 4 MMOL/L
BASE EXCESS BLDV CALC-SCNC: 4.8 MMOL/L
BASE EXCESS BLDV CALC-SCNC: 7.1 MMOL/L
BASOPHILS # BLD AUTO: 0 10E9/L (ref 0–0.2)
BASOPHILS NFR BLD AUTO: 0.5 %
BUN SERPL-MCNC: 49 MG/DL (ref 7–30)
BUN SERPL-MCNC: 54 MG/DL (ref 7–30)
CALCIUM SERPL-MCNC: 8.3 MG/DL (ref 8.5–10.1)
CALCIUM SERPL-MCNC: 8.5 MG/DL (ref 8.5–10.1)
CHLORIDE SERPL-SCNC: 101 MMOL/L (ref 94–109)
CHLORIDE SERPL-SCNC: 103 MMOL/L (ref 94–109)
CO2 SERPL-SCNC: 27 MMOL/L (ref 20–32)
CO2 SERPL-SCNC: 28 MMOL/L (ref 20–32)
CREAT SERPL-MCNC: 1.56 MG/DL (ref 0.66–1.25)
CREAT SERPL-MCNC: 1.67 MG/DL (ref 0.66–1.25)
DIFFERENTIAL METHOD BLD: ABNORMAL
EOSINOPHIL # BLD AUTO: 0 10E9/L (ref 0–0.7)
EOSINOPHIL NFR BLD AUTO: 0.7 %
ERYTHROCYTE [DISTWIDTH] IN BLOOD BY AUTOMATED COUNT: 19.2 % (ref 10–15)
GFR SERPL CREATININE-BSD FRML MDRD: 43 ML/MIN/{1.73_M2}
GFR SERPL CREATININE-BSD FRML MDRD: 46 ML/MIN/{1.73_M2}
GLUCOSE BLDC GLUCOMTR-MCNC: 117 MG/DL (ref 70–99)
GLUCOSE BLDC GLUCOMTR-MCNC: 134 MG/DL (ref 70–99)
GLUCOSE BLDC GLUCOMTR-MCNC: 78 MG/DL (ref 70–99)
GLUCOSE BLDC GLUCOMTR-MCNC: 93 MG/DL (ref 70–99)
GLUCOSE SERPL-MCNC: 254 MG/DL (ref 70–99)
GLUCOSE SERPL-MCNC: 99 MG/DL (ref 70–99)
HCO3 BLDV-SCNC: 29 MMOL/L (ref 21–28)
HCO3 BLDV-SCNC: 30 MMOL/L (ref 21–28)
HCO3 BLDV-SCNC: 33 MMOL/L (ref 21–28)
HCT VFR BLD AUTO: 36.3 % (ref 40–53)
HGB BLD-MCNC: 10.4 G/DL (ref 13.3–17.7)
IMM GRANULOCYTES # BLD: 0 10E9/L (ref 0–0.4)
IMM GRANULOCYTES NFR BLD: 0.5 %
INR PPP: 2.88 (ref 0.86–1.14)
LDH SERPL L TO P-CCNC: 422 U/L (ref 85–227)
LYMPHOCYTES # BLD AUTO: 1 10E9/L (ref 0.8–5.3)
LYMPHOCYTES NFR BLD AUTO: 17.1 %
MAGNESIUM SERPL-MCNC: 2.4 MG/DL (ref 1.6–2.3)
MAGNESIUM SERPL-MCNC: 2.6 MG/DL (ref 1.6–2.3)
MCH RBC QN AUTO: 24.7 PG (ref 26.5–33)
MCHC RBC AUTO-ENTMCNC: 28.7 G/DL (ref 31.5–36.5)
MCV RBC AUTO: 86 FL (ref 78–100)
MONOCYTES # BLD AUTO: 0.6 10E9/L (ref 0–1.3)
MONOCYTES NFR BLD AUTO: 10.6 %
NEUTROPHILS # BLD AUTO: 4 10E9/L (ref 1.6–8.3)
NEUTROPHILS NFR BLD AUTO: 70.6 %
NRBC # BLD AUTO: 0 10*3/UL
NRBC BLD AUTO-RTO: 0 /100
O2/TOTAL GAS SETTING VFR VENT: 21 %
OXYHGB MFR BLDV: 57 %
OXYHGB MFR BLDV: 58 %
OXYHGB MFR BLDV: 59 %
PCO2 BLDV: 47 MM HG (ref 40–50)
PCO2 BLDV: 49 MM HG (ref 40–50)
PCO2 BLDV: 49 MM HG (ref 40–50)
PH BLDV: 7.4 PH (ref 7.32–7.43)
PH BLDV: 7.41 PH (ref 7.32–7.43)
PH BLDV: 7.43 PH (ref 7.32–7.43)
PHOSPHATE SERPL-MCNC: 3.4 MG/DL (ref 2.5–4.5)
PLATELET # BLD AUTO: 232 10E9/L (ref 150–450)
PO2 BLDV: 33 MM HG (ref 25–47)
PO2 BLDV: 33 MM HG (ref 25–47)
PO2 BLDV: 35 MM HG (ref 25–47)
POTASSIUM SERPL-SCNC: 3.8 MMOL/L (ref 3.4–5.3)
POTASSIUM SERPL-SCNC: 4.5 MMOL/L (ref 3.4–5.3)
POTASSIUM SERPL-SCNC: 4.9 MMOL/L (ref 3.4–5.3)
RBC # BLD AUTO: 4.21 10E12/L (ref 4.4–5.9)
SODIUM SERPL-SCNC: 136 MMOL/L (ref 133–144)
SODIUM SERPL-SCNC: 137 MMOL/L (ref 133–144)
WBC # BLD AUTO: 5.7 10E9/L (ref 4–11)

## 2021-01-08 PROCEDURE — 250N000012 HC RX MED GY IP 250 OP 636 PS 637: Performed by: INTERNAL MEDICINE

## 2021-01-08 PROCEDURE — 999N000128 HC STATISTIC PERIPHERAL IV START W/O US GUIDANCE

## 2021-01-08 PROCEDURE — 93306 TTE W/DOPPLER COMPLETE: CPT

## 2021-01-08 PROCEDURE — 83735 ASSAY OF MAGNESIUM: CPT | Performed by: STUDENT IN AN ORGANIZED HEALTH CARE EDUCATION/TRAINING PROGRAM

## 2021-01-08 PROCEDURE — 83615 LACTATE (LD) (LDH) ENZYME: CPT | Performed by: INTERNAL MEDICINE

## 2021-01-08 PROCEDURE — 80048 BASIC METABOLIC PNL TOTAL CA: CPT | Performed by: STUDENT IN AN ORGANIZED HEALTH CARE EDUCATION/TRAINING PROGRAM

## 2021-01-08 PROCEDURE — 250N000011 HC RX IP 250 OP 636: Performed by: STUDENT IN AN ORGANIZED HEALTH CARE EDUCATION/TRAINING PROGRAM

## 2021-01-08 PROCEDURE — 80048 BASIC METABOLIC PNL TOTAL CA: CPT | Performed by: INTERNAL MEDICINE

## 2021-01-08 PROCEDURE — 83735 ASSAY OF MAGNESIUM: CPT | Performed by: INTERNAL MEDICINE

## 2021-01-08 PROCEDURE — 85025 COMPLETE CBC W/AUTO DIFF WBC: CPT | Performed by: INTERNAL MEDICINE

## 2021-01-08 PROCEDURE — 85610 PROTHROMBIN TIME: CPT | Performed by: INTERNAL MEDICINE

## 2021-01-08 PROCEDURE — 99233 SBSQ HOSP IP/OBS HIGH 50: CPT | Mod: GC | Performed by: INTERNAL MEDICINE

## 2021-01-08 PROCEDURE — 84132 ASSAY OF SERUM POTASSIUM: CPT | Performed by: INTERNAL MEDICINE

## 2021-01-08 PROCEDURE — 82805 BLOOD GASES W/O2 SATURATION: CPT | Performed by: STUDENT IN AN ORGANIZED HEALTH CARE EDUCATION/TRAINING PROGRAM

## 2021-01-08 PROCEDURE — 71045 X-RAY EXAM CHEST 1 VIEW: CPT | Mod: 26 | Performed by: RADIOLOGY

## 2021-01-08 PROCEDURE — 250N000013 HC RX MED GY IP 250 OP 250 PS 637: Performed by: INTERNAL MEDICINE

## 2021-01-08 PROCEDURE — 84100 ASSAY OF PHOSPHORUS: CPT | Performed by: INTERNAL MEDICINE

## 2021-01-08 PROCEDURE — 93306 TTE W/DOPPLER COMPLETE: CPT | Mod: 26 | Performed by: INTERNAL MEDICINE

## 2021-01-08 PROCEDURE — 250N000013 HC RX MED GY IP 250 OP 250 PS 637: Performed by: STUDENT IN AN ORGANIZED HEALTH CARE EDUCATION/TRAINING PROGRAM

## 2021-01-08 PROCEDURE — 250N000009 HC RX 250: Performed by: INTERNAL MEDICINE

## 2021-01-08 PROCEDURE — 71045 X-RAY EXAM CHEST 1 VIEW: CPT

## 2021-01-08 PROCEDURE — 214N000001 HC R&B CCU UMMC

## 2021-01-08 PROCEDURE — 999N001017 HC STATISTIC GLUCOSE BY METER IP

## 2021-01-08 RX ORDER — GABAPENTIN 300 MG/1
300 CAPSULE ORAL
Status: DISCONTINUED | OUTPATIENT
Start: 2021-01-08 | End: 2021-01-11 | Stop reason: HOSPADM

## 2021-01-08 RX ORDER — GABAPENTIN 600 MG/1
600 TABLET ORAL
Status: DISCONTINUED | OUTPATIENT
Start: 2021-01-08 | End: 2021-01-11 | Stop reason: HOSPADM

## 2021-01-08 RX ORDER — WARFARIN SODIUM 6 MG/1
6 TABLET ORAL
Status: COMPLETED | OUTPATIENT
Start: 2021-01-08 | End: 2021-01-08

## 2021-01-08 RX ORDER — POTASSIUM CHLORIDE 29.8 MG/ML
20 INJECTION INTRAVENOUS
Status: DISPENSED | OUTPATIENT
Start: 2021-01-08 | End: 2021-01-08

## 2021-01-08 RX ORDER — POTASSIUM CHLORIDE 750 MG/1
40 TABLET, EXTENDED RELEASE ORAL DAILY
Status: DISCONTINUED | OUTPATIENT
Start: 2021-01-09 | End: 2021-01-11 | Stop reason: HOSPADM

## 2021-01-08 RX ADMIN — ALLOPURINOL 300 MG: 300 TABLET ORAL at 08:04

## 2021-01-08 RX ADMIN — ISOSORBIDE DINITRATE 10 MG: 5 TABLET ORAL at 14:22

## 2021-01-08 RX ADMIN — POTASSIUM CHLORIDE 40 MEQ: 750 TABLET, EXTENDED RELEASE ORAL at 08:04

## 2021-01-08 RX ADMIN — ISOSORBIDE DINITRATE 10 MG: 5 TABLET ORAL at 08:03

## 2021-01-08 RX ADMIN — TRAMADOL HYDROCHLORIDE 50 MG: 50 TABLET, FILM COATED ORAL at 21:14

## 2021-01-08 RX ADMIN — GABAPENTIN 600 MG: 600 TABLET, FILM COATED ORAL at 21:14

## 2021-01-08 RX ADMIN — AMIODARONE HYDROCHLORIDE 400 MG: 200 TABLET ORAL at 08:03

## 2021-01-08 RX ADMIN — PREDNISONE 5 MG: 5 TABLET ORAL at 08:04

## 2021-01-08 RX ADMIN — GABAPENTIN 300 MG: 300 CAPSULE ORAL at 13:59

## 2021-01-08 RX ADMIN — GABAPENTIN 300 MG: 300 CAPSULE ORAL at 08:04

## 2021-01-08 RX ADMIN — POTASSIUM CHLORIDE 20 MEQ: 29.8 INJECTION, SOLUTION INTRAVENOUS at 06:06

## 2021-01-08 RX ADMIN — ASPIRIN 81 MG CHEWABLE TABLET 81 MG: 81 TABLET CHEWABLE at 08:04

## 2021-01-08 RX ADMIN — ZINC SULFATE 220 MG (50 MG) CAPSULE 220 MG: CAPSULE at 21:14

## 2021-01-08 RX ADMIN — ACETAMINOPHEN 650 MG: 325 TABLET, FILM COATED ORAL at 21:14

## 2021-01-08 RX ADMIN — ACETAMINOPHEN 650 MG: 325 TABLET, FILM COATED ORAL at 14:01

## 2021-01-08 RX ADMIN — WARFARIN SODIUM 6 MG: 6 TABLET ORAL at 17:55

## 2021-01-08 RX ADMIN — TRAMADOL HYDROCHLORIDE 50 MG: 50 TABLET, FILM COATED ORAL at 14:01

## 2021-01-08 RX ADMIN — UMECLIDINIUM 1 PUFF: 62.5 AEROSOL, POWDER ORAL at 08:08

## 2021-01-08 RX ADMIN — MONTELUKAST 10 MG: 10 TABLET, FILM COATED ORAL at 21:14

## 2021-01-08 RX ADMIN — FLUTICASONE FUROATE AND VILANTEROL TRIFENATATE 1 PUFF: 200; 25 POWDER RESPIRATORY (INHALATION) at 08:09

## 2021-01-08 RX ADMIN — HYDRALAZINE HYDROCHLORIDE 75 MG: 25 TABLET ORAL at 08:03

## 2021-01-08 RX ADMIN — HYDRALAZINE HYDROCHLORIDE 75 MG: 25 TABLET ORAL at 13:59

## 2021-01-08 RX ADMIN — ZINC SULFATE 220 MG (50 MG) CAPSULE 220 MG: CAPSULE at 08:03

## 2021-01-08 RX ADMIN — BUPROPION HYDROCHLORIDE 150 MG: 150 TABLET, EXTENDED RELEASE ORAL at 08:03

## 2021-01-08 RX ADMIN — INSULIN GLARGINE 6 UNITS: 100 INJECTION, SOLUTION SUBCUTANEOUS at 21:14

## 2021-01-08 RX ADMIN — ISOSORBIDE DINITRATE 10 MG: 5 TABLET ORAL at 21:13

## 2021-01-08 RX ADMIN — BUMETANIDE 0.5 MG/HR: 0.25 INJECTION INTRAMUSCULAR; INTRAVENOUS at 12:19

## 2021-01-08 RX ADMIN — HYDRALAZINE HYDROCHLORIDE 75 MG: 25 TABLET ORAL at 21:13

## 2021-01-08 RX ADMIN — PANTOPRAZOLE SODIUM 40 MG: 40 TABLET, DELAYED RELEASE ORAL at 08:03

## 2021-01-08 ASSESSMENT — ACTIVITIES OF DAILY LIVING (ADL)
ADLS_ACUITY_SCORE: 12

## 2021-01-08 ASSESSMENT — MIFFLIN-ST. JEOR: SCORE: 1781.93

## 2021-01-08 NOTE — PROGRESS NOTES
D: Called patient for routine virtual VAD Coordinator rounding (r/t COVID-19). No VAD related questions or concerns at this time.  I: Discussed POC and provided support and listened to patient and care giver's thoughts and concerns.  P: Continue to follow patient and address any questions or concerns patient and or caregiver may have.

## 2021-01-08 NOTE — PLAN OF CARE
Major Shift Events: Bumex started at 1 mg/hr after 6 mg loading dose given. Urine output 2000 mL. CVP=18. Pain located to left arm, PRN Tylenol and Tramadol given with little relief.   Plan: SWAN numbers 2 times a day. Monitor urine output.   For vital signs and complete assessments, please see documentation flowsheets.

## 2021-01-08 NOTE — PROGRESS NOTES
[facility] Cardiology Progress Note           Assessment and Plan:   This patient is a 65 year old with PMH of NICM (EF 20%) s/p HM II LVAD placement (6/19/17) at DT (obesity) metronic dual chamber ICD. Other relavant history includes atrial fibrillation, CKD stage III, DM type 2, RV failure, CECILIA, obesity, hypertension, COPD, asthma, here with acute on chronic systolic heart failure, admitted for RHC and heart failure treatment.     Changes 1/8  - transfer to 6C floor  - decrease bumex dose from 1 mg/hr to 0.5 mg/hr  - increase speed from 9400 to 9600  - reevaluate by repeating swan number: CVP 9, PA 38/20,PCWP 16, CI 2.2, SVR 1036.9  - order placed for swan removal   - plan to consult EP on Monday 1/11 regarding amiodarone dosage    #NICM (EF 20%) s/p HM II LVAD placement (6/19/17) at DT (obesity)  #Hypervolemic status   On initial presentation his MAP~, CVP 18 on lying position, slightly volume up on physical exam. LVAD setting: flow 6, speed 9400, pulse index 5.0, pump power 6.2. (baseline). Echo as showed below.    Responds well to bumex 6 mg then 1 mg/hr drip overnight with net negative almost 7L with downtrending in his weight and symptoms, improvement of his creatinine, and improvement of HD; This AM his CVP was 10 with elevated PCWP of ~20. Will increase his speed from 9400 --> 9600 to further unload LV and decrease Bumex from 1 mg/hr to 0.5 mg/hr. Repeat swan showed improvement in his PCWP and CI 2.2.    - transfer to 6C floor after removal of swan  - decrease Bumex dose from 1 mg/hr to 0.5 mg/hr  - increase LVAD speed from 9400 to 9600, decrease PCWP after reevaluation  - vitals+CVP q12h, PCWP daily  - CXR daily while swan is remained  - BMP q12h   - heart failure management    Afterload reduction: increase hydralazine to 75 TID, add isosorbide dinitrate 10 mg TID;              MAP goal: 80 mmHg   CCB: on amiodarone 400 mg daily, not on BB   Diuretic: continue bumex drip 0.5 mg/hr +  decrease KCL to 40 PO daily   Antiplatelet: ASA, Anticoagulation: Warfarin   SCD: Metronic dual chamber ICD     #WALTER on CKD  Has been trending up and down in the past 6 months. Cardiorenal vs prerenal from diuresis. Current creatinine is 1.79 and down to 1.67.  - trend BMP  - monitor I/O    #H/o atrial fibrillation on warfarin  #H/o VT/VF on amiodarone  On coumadin. Current INR 2.88. Warfarin held prior to RHC.  - Resume warfarin after RHC  - Pharmacy to dose warfarin  - continue amiodarone 400 mg daily(since 06/2020 after an episode of ICD shock showed VT)    Chronic conditions  DM type 2: glargine 6 unit, Medium dose insuline sliding scale  Hypertension: Hydralazine 75 mg TID(increase from 50 mg TID)  COPD/Asthma:  Scheduled Breo Ellipta. Continue albuterol prn  CECILIA: continue CPAP during bedtime  Psychiatric condition: Bupropion    Diet: moderate carb consistent  Fluid: restriction 1.8L/day  DVT prophylaxis: on warfarin   Code status: full  Disposition: discharge to home in 2-3 days after euvolemic status is achieved and LVAD speed is optimized.    Patient staffed with Dr. Conte.    Ml Hull MD  Internal Medicine, PGY-1      I have reviewed today's vital signs, notes, medications, labs and imaging. I have also seen and examined the patient and agree with the findings and plan as outlined above.    Rose Conte MD  Section Head - Advanced Heart Failure, Transplantation and Mechanical Circulatory Support  Director - Adult Congenital and Cardiovascular [facility]etics [facility]  Associate Professor of Medicine, [facility]        Interval History:   Feels tired given he didn't have good sleep last night. Feels less bloated in his abdomen. His feet are less swollen. Otherwise, denies any chest pain, SOB, dizziness, lightheadedness.          Review of Systems:   The Review of Systems is negative other than noted in the HPI          Medications:       allopurinol  300 mg Oral Daily      amiodarone  400 mg Oral Daily     aspirin  81 mg Oral or Feeding Tube Daily     buPROPion  150 mg Oral QAM     fluticasone-vilanterol  1 puff Inhalation Daily     gabapentin  300 mg Oral 2 times per day     gabapentin  600 mg Oral Daily     hydrALAZINE  75 mg Oral TID     insulin aspart  1-7 Units Subcutaneous TID AC     insulin aspart  1-5 Units Subcutaneous At Bedtime     insulin glargine  6 Units Subcutaneous At Bedtime     isosorbide dinitrate  10 mg Oral TID     montelukast  10 mg Oral At Bedtime     pantoprazole  40 mg Oral QAM     [START ON 1/9/2021] potassium chloride ER  40 mEq Oral Daily     predniSONE  5 mg Oral Daily     umeclidinium  1 puff Inhalation Daily     warfarin ANTICOAGULANT  6 mg Oral ONCE at 18:00     zinc sulfate  220 mg Oral BID             Physical Exam (Resident / Clinician):   Vitals were reviewed  Temp: 98.3  F (36.8  C) Temp src: Oral BP: (!) 87/78 Pulse: 77   Resp: 20 SpO2: 98 % O2 Device: None (Room air)     Weight down from 228-->223 lbs  I/O net negative ~7L in 4 shift (24hr+morning shift)  Hemodynamic this AM: CVP 10, PA 36/20/25, PCWP 20, CI 2, CO 4.46, SVR 1255, PVR 1.1  LVAD: Flow 4.57, PI 3.9-6.6, speed 9400, Power 5.4-6.8    GA: NAD  HEENT: swan on the right side, JVP~ 9  Lungs: clear on auscultation oth lungs  CVS: LVAD hums, no peripheral edema(improved)  Abd: normal bowel sounds, soft, nontender  Ext: no joint swelling or tenderness  Neuro: A&O, moving independently, otherwise grossly intact           Data:     Results for orders placed or performed during the hospital encounter of 01/07/21 (from the past 24 hour(s))   XR Chest Port 1 View    Narrative    EXAM: XR CHEST 1 VW 1/7/2021      HISTORY: retained swan.    COMPARISON: Radiograph 9/23/2020.     TECHNIQUE: Frontal view of the chest.    FINDINGS: Right internal jugular central [place]-Aashish catheter tip  projects over the proximal right main pulmonary artery. Stable 3-lead  implantable cardiac defibrillator in the left  chest wall. Epicardial  pacing wires. Discontinuous superior most sternotomy wire. Hazy  interstitial pulmonary opacities, left greater than right. Streaky  basilar opacities. No focal airspace disease. No definite large  effusion or pneumothorax. Heart is enlarged with partially visualized  LVAD in place.      Impression    IMPRESSION: Right jugular [place]-Aashish catheter tip urinary proximal  right main pulmonary artery. Otherwise unchanged chest with LVAD,  implantable cardiac defibrillator, cardiomegaly, mild pulmonary edema.    I have personally reviewed the examination and initial interpretation  and I agree with the findings.    MELY KELLY MD   Glucose by meter   Result Value Ref Range    Glucose 166 (H) 70 - 99 mg/dL   Blood gas venous with oxyhemoglobin   Result Value Ref Range    Ph Venous 7.38 7.32 - 7.43 pH    PCO2 Venous 49 40 - 50 mm Hg    PO2 Venous 29 25 - 47 mm Hg    Bicarbonate Venous 29 (H) 21 - 28 mmol/L    FIO2 21     Oxyhemoglobin Venous 46 %    Base Excess Venous 2.9 mmol/L   Glucose by meter   Result Value Ref Range    Glucose 94 70 - 99 mg/dL   XR Chest Port 1 View    Narrative    EXAM: XR CHEST PORT 1 VW  1/8/2021 1:05 AM     HISTORY:  retained swan       COMPARISON:  1/7/2021    TECHNIQUE: Single frontal radiograph of the chest    FINDINGS/    Impression    IMPRESSION: Stable postsurgical changes, support devices including  [place]-Aashish catheter. No acute consolidation.     I have personally reviewed the examination and initial interpretation  and I agree with the findings.    JEROME CUMMINGS MD   Blood gas venous with oxyhemoglobin   Result Value Ref Range    Ph Venous 7.40 7.32 - 7.43 pH    PCO2 Venous 47 40 - 50 mm Hg    PO2 Venous 33 25 - 47 mm Hg    Bicarbonate Venous 29 (H) 21 - 28 mmol/L    FIO2 21     Oxyhemoglobin Venous 58 %    Base Excess Venous 4.0 mmol/L   CBC with platelets differential   Result Value Ref Range    WBC 5.7 4.0 - 11.0 10e9/L    RBC Count 4.21 (L) 4.4 - 5.9  10e12/L    Hemoglobin 10.4 (L) 13.3 - 17.7 g/dL    Hematocrit 36.3 (L) 40.0 - 53.0 %    MCV 86 78 - 100 fl    MCH 24.7 (L) 26.5 - 33.0 pg    MCHC 28.7 (L) 31.5 - 36.5 g/dL    RDW 19.2 (H) 10.0 - 15.0 %    Platelet Count 232 150 - 450 10e9/L    Diff Method Automated Method     % Neutrophils 70.6 %    % Lymphocytes 17.1 %    % Monocytes 10.6 %    % Eosinophils 0.7 %    % Basophils 0.5 %    % Immature Granulocytes 0.5 %    Nucleated RBCs 0 0 /100    Absolute Neutrophil 4.0 1.6 - 8.3 10e9/L    Absolute Lymphocytes 1.0 0.8 - 5.3 10e9/L    Absolute Monocytes 0.6 0.0 - 1.3 10e9/L    Absolute Eosinophils 0.0 0.0 - 0.7 10e9/L    Absolute Basophils 0.0 0.0 - 0.2 10e9/L    Abs Immature Granulocytes 0.0 0 - 0.4 10e9/L    Absolute Nucleated RBC 0.0    INR   Result Value Ref Range    INR 2.88 (H) 0.86 - 1.14   Lactate Dehydrogenase   Result Value Ref Range    Lactate Dehydrogenase 422 (H) 85 - 227 U/L   Basic metabolic panel   Result Value Ref Range    Sodium 137 133 - 144 mmol/L    Potassium 3.8 3.4 - 5.3 mmol/L    Chloride 103 94 - 109 mmol/L    Carbon Dioxide 27 20 - 32 mmol/L    Anion Gap 8 3 - 14 mmol/L    Glucose 99 70 - 99 mg/dL    Urea Nitrogen 54 (H) 7 - 30 mg/dL    Creatinine 1.56 (H) 0.66 - 1.25 mg/dL    GFR Estimate 46 (L) >60 mL/min/[1.73_m2]    GFR Estimate If Black 54 (L) >60 mL/min/[1.73_m2]    Calcium 8.5 8.5 - 10.1 mg/dL   Magnesium   Result Value Ref Range    Magnesium 2.6 (H) 1.6 - 2.3 mg/dL   Glucose by meter   Result Value Ref Range    Glucose 93 70 - 99 mg/dL   Blood gas venous with oxyhemoglobin   Result Value Ref Range    Ph Venous 7.43 7.32 - 7.43 pH    PCO2 Venous 49 40 - 50 mm Hg    PO2 Venous 33 25 - 47 mm Hg    Bicarbonate Venous 33 (H) 21 - 28 mmol/L    FIO2 21     Oxyhemoglobin Venous 57 %    Base Excess Venous 7.1 mmol/L   Echo Complete    Narrative    [id number]  [id number]  [id number]  [id number]^[id number]^NANDO                                                                          Version 2         [facility],[place]  Echocardiography Laboratory  [address] [place] [address]     Name: LOIS FULLER  MRN: [id number]  : 1955  Study Date: 2021 08:57 AM  Age: 65 yrs  Gender: Male  Patient Location: [facility]  Reason For Study: CHF  Ordering Physician: NANDO GIRON  Referring Physician: CATALINA ODELL  Performed By: SONY Miller     BSA: 2.2 m2  Height: 68 in  Weight: 228 lb  BP: 82/74 mmHg  _____________________________________________________________________________  __        Procedure  LVAD Echocardiogram with two-dimensional, color and spectral Doppler  performed.  _____________________________________________________________________________  __        Interpretation Summary  HM2 at 9400RPM.  Severely (EF <30%) reduced left ventricular function is present. Moderate left  ventricular dilation is present. LVIDd 68mm. Septum midline.  RV is not well visulized. At least mildly reduced.  Aortic valve remain closed with no AI. Normal inflow velocities (77 cm/s).  Outflow velocity cannot be assessed.  Mild to moderate eccentric mitral insufficiency is present.  Dilation of the inferior vena cava is present with abnormal respiratory  variation in diameter.  IVC diameter >2.1 cm collapsing <50% with sniff suggests a high RA pressure  estimated at 15 mmHg or greater.     This study was compared with the study from 2020 .LVIDd (previous study  63 mm) enlarged further. Otherwise no significant change.  _____________________________________________________________________________  __        Left Ventricle  LV eccentric hypertrophy. Moderate left ventricular dilation is present.  Severely (EF <30%) reduced left ventricular function is present. Diastolic  function not assessed due to presence of LVAD. Severe diffuse hypokinesis is  present. LVAD cannula was seen in the expected anatomic position in the LV  apex.     Right Ventricle  RV is not well  visulized. At least mildly reduced. A pacemaker lead is noted  in the right ventricle.     Mitral Valve  Mild to moderate mitral insufficiency is present.     Aortic Valve  No aortic regurgitation is present.        Tricuspid Valve  Mild tricuspid insufficiency is present. The right ventricular systolic  pressure is approximated at 20.3 mmHg plus the right atrial pressure.     Pulmonic Valve  The pulmonic valve cannot be assessed.     Vessels  The aorta root is normal. Dilation of the inferior vena cava is present with  abnormal respiratory variation in diameter. IVC diameter >2.1 cm collapsing  <50% with sniff suggests a high RA pressure estimated at 15 mmHg or greater.     Pericardium  No pericardial effusion is present.     Compared to Previous Study  This study was compared with the study from 2020 . LVEDD enlarged  further. Otherwise no significant change.     _____________________________________________________________________________  __  MMode/2D Measurements & Calculations  IVSd: 0.78 cm  LVIDd: 6.8 cm  LVIDs: 6.3 cm  LVPWd: 1.0 cm  FS: 7.1 %     LV mass(C)d: 274.7 grams  LV mass(C)dI: 127.1 grams/m2  RWT: 0.31        Doppler Measurements & Calculations  TR max sloane: 225.0 cm/sec  TR max P.3 mmHg     _____________________________________________________________________________  __           Report approved by: Kailee LOERA 2021 10:30 AM      Potassium   Result Value Ref Range    Potassium 4.5 3.4 - 5.3 mmol/L   Magnesium   Result Value Ref Range    Magnesium 2.4 (H) 1.6 - 2.3 mg/dL   Phosphorus   Result Value Ref Range    Phosphorus 3.4 2.5 - 4.5 mg/dL   Glucose by meter   Result Value Ref Range    Glucose 134 (H) 70 - 99 mg/dL   Basic metabolic panel   Result Value Ref Range    Sodium 136 133 - 144 mmol/L    Potassium 4.9 3.4 - 5.3 mmol/L    Chloride 101 94 - 109 mmol/L    Carbon Dioxide 28 20 - 32 mmol/L    Anion Gap 7 3 - 14 mmol/L    Glucose 254 (H) 70 - 99 mg/dL    Urea Nitrogen 49  (H) 7 - 30 mg/dL    Creatinine 1.67 (H) 0.66 - 1.25 mg/dL    GFR Estimate 43 (L) >60 mL/min/[1.73_m2]    GFR Estimate If Black 50 (L) >60 mL/min/[1.73_m2]    Calcium 8.3 (L) 8.5 - 10.1 mg/dL   Blood gas venous with oxyhemoglobin   Result Value Ref Range    Ph Venous 7.41 7.32 - 7.43 pH    PCO2 Venous 49 40 - 50 mm Hg    PO2 Venous 35 25 - 47 mm Hg    Bicarbonate Venous 30 (H) 21 - 28 mmol/L    FIO2 21     Oxyhemoglobin Venous 59 %    Base Excess Venous 4.8 mmol/L     *Note: Due to a large number of results and/or encounters for the requested time period, some results have not been displayed. A complete set of results can be found in Results Review.        Echocardiology 1/8  HM2 at 9400RPM.  Severely (EF <30%) reduced left ventricular function is present. Moderate left  ventricular dilation is present. LVIDd 68mm. Septum midline.  RV is not well visulized. At least mildly reduced.  Aortic valve remain closed with no AI. Normal inflow velocities (77 cm/s).  Outflow velocity cannot be assessed.  Mild to moderate eccentric mitral insufficiency is present.  Dilation of the inferior vena cava is present with abnormal respiratory  variation in diameter.  IVC diameter >2.1 cm collapsing <50% with sniff suggests a high RA pressure  estimated at 15 mmHg or greater.     This study was compared with the study from 9/19/2020 .LVIDd (previous study  63 mm) enlarged further. Otherwise no significant change.

## 2021-01-08 NOTE — PLAN OF CARE
Major Shift Events: LVAD speed increased to 9600 per cards 2 team - hemodynamics stable; swan and internal jugular removed per order. Bumex drip decreased to 0.5 mg/hr.    Plan: Continue to fluid optimize; possible transfer to floor.    For vital signs and complete assessments, please see documentation flowsheets.

## 2021-01-08 NOTE — PLAN OF CARE
Major Shift Events: Pt AOx4, c/o shoulder ache, relieved with PRN tylenol/tramadol. No changes to bumex gtt, UOP>3L since midnight, C2 to pass on to day team to potentially decrease bumex gtt. AM SvO2 58, CVP 11, CI 2.3. VSS, wore hospital CPAP overnight. No LVAD alarms, dressing not changed as pt reports wife had changed dressing at home on 1/6, see patient care order to reassess dressing changes on 1/8. K replaced.  Plan: Monitor hemodynamics, continue to diurese.  For vital signs and complete assessments, please see documentation flowsheets.

## 2021-01-09 LAB
ANION GAP SERPL CALCULATED.3IONS-SCNC: 3 MMOL/L (ref 3–14)
ANION GAP SERPL CALCULATED.3IONS-SCNC: 7 MMOL/L (ref 3–14)
BUN SERPL-MCNC: 50 MG/DL (ref 7–30)
BUN SERPL-MCNC: 50 MG/DL (ref 7–30)
CALCIUM SERPL-MCNC: 8.3 MG/DL (ref 8.5–10.1)
CALCIUM SERPL-MCNC: 8.3 MG/DL (ref 8.5–10.1)
CHLORIDE SERPL-SCNC: 102 MMOL/L (ref 94–109)
CHLORIDE SERPL-SCNC: 98 MMOL/L (ref 94–109)
CO2 SERPL-SCNC: 29 MMOL/L (ref 20–32)
CO2 SERPL-SCNC: 29 MMOL/L (ref 20–32)
CREAT SERPL-MCNC: 1.85 MG/DL (ref 0.66–1.25)
CREAT SERPL-MCNC: 1.86 MG/DL (ref 0.66–1.25)
ERYTHROCYTE [DISTWIDTH] IN BLOOD BY AUTOMATED COUNT: 18.8 % (ref 10–15)
GFR SERPL CREATININE-BSD FRML MDRD: 38 ML/MIN/{1.73_M2}
GFR SERPL CREATININE-BSD FRML MDRD: 38 ML/MIN/{1.73_M2}
GLUCOSE BLDC GLUCOMTR-MCNC: 102 MG/DL (ref 70–99)
GLUCOSE BLDC GLUCOMTR-MCNC: 110 MG/DL (ref 70–99)
GLUCOSE BLDC GLUCOMTR-MCNC: 156 MG/DL (ref 70–99)
GLUCOSE BLDC GLUCOMTR-MCNC: 165 MG/DL (ref 70–99)
GLUCOSE BLDC GLUCOMTR-MCNC: 320 MG/DL (ref 70–99)
GLUCOSE SERPL-MCNC: 276 MG/DL (ref 70–99)
GLUCOSE SERPL-MCNC: 93 MG/DL (ref 70–99)
HCT VFR BLD AUTO: 32.5 % (ref 40–53)
HGB BLD-MCNC: 9.7 G/DL (ref 13.3–17.7)
INR PPP: 2.64 (ref 0.86–1.14)
LDH SERPL L TO P-CCNC: 363 U/L (ref 85–227)
MAGNESIUM SERPL-MCNC: 2.5 MG/DL (ref 1.6–2.3)
MCH RBC QN AUTO: 25.1 PG (ref 26.5–33)
MCHC RBC AUTO-ENTMCNC: 29.8 G/DL (ref 31.5–36.5)
MCV RBC AUTO: 84 FL (ref 78–100)
PHOSPHATE SERPL-MCNC: 3.3 MG/DL (ref 2.5–4.5)
PLATELET # BLD AUTO: 219 10E9/L (ref 150–450)
POTASSIUM SERPL-SCNC: 3.8 MMOL/L (ref 3.4–5.3)
POTASSIUM SERPL-SCNC: 4.9 MMOL/L (ref 3.4–5.3)
RBC # BLD AUTO: 3.87 10E12/L (ref 4.4–5.9)
SODIUM SERPL-SCNC: 130 MMOL/L (ref 133–144)
SODIUM SERPL-SCNC: 138 MMOL/L (ref 133–144)
URATE SERPL-MCNC: 5 MG/DL (ref 3.5–7.2)
WBC # BLD AUTO: 5.8 10E9/L (ref 4–11)

## 2021-01-09 PROCEDURE — 84100 ASSAY OF PHOSPHORUS: CPT | Performed by: INTERNAL MEDICINE

## 2021-01-09 PROCEDURE — 250N000012 HC RX MED GY IP 250 OP 636 PS 637: Performed by: INTERNAL MEDICINE

## 2021-01-09 PROCEDURE — 83615 LACTATE (LD) (LDH) ENZYME: CPT | Performed by: INTERNAL MEDICINE

## 2021-01-09 PROCEDURE — 999N001017 HC STATISTIC GLUCOSE BY METER IP

## 2021-01-09 PROCEDURE — 250N000013 HC RX MED GY IP 250 OP 250 PS 637

## 2021-01-09 PROCEDURE — 36415 COLL VENOUS BLD VENIPUNCTURE: CPT | Performed by: INTERNAL MEDICINE

## 2021-01-09 PROCEDURE — 250N000013 HC RX MED GY IP 250 OP 250 PS 637: Performed by: INTERNAL MEDICINE

## 2021-01-09 PROCEDURE — 84550 ASSAY OF BLOOD/URIC ACID: CPT | Performed by: INTERNAL MEDICINE

## 2021-01-09 PROCEDURE — 214N000001 HC R&B CCU UMMC

## 2021-01-09 PROCEDURE — 85027 COMPLETE CBC AUTOMATED: CPT | Performed by: INTERNAL MEDICINE

## 2021-01-09 PROCEDURE — 250N000012 HC RX MED GY IP 250 OP 636 PS 637: Performed by: STUDENT IN AN ORGANIZED HEALTH CARE EDUCATION/TRAINING PROGRAM

## 2021-01-09 PROCEDURE — 99232 SBSQ HOSP IP/OBS MODERATE 35: CPT | Mod: GC | Performed by: INTERNAL MEDICINE

## 2021-01-09 PROCEDURE — 250N000013 HC RX MED GY IP 250 OP 250 PS 637: Performed by: STUDENT IN AN ORGANIZED HEALTH CARE EDUCATION/TRAINING PROGRAM

## 2021-01-09 PROCEDURE — 85610 PROTHROMBIN TIME: CPT | Performed by: INTERNAL MEDICINE

## 2021-01-09 PROCEDURE — 80048 BASIC METABOLIC PNL TOTAL CA: CPT | Performed by: INTERNAL MEDICINE

## 2021-01-09 PROCEDURE — 83735 ASSAY OF MAGNESIUM: CPT | Performed by: INTERNAL MEDICINE

## 2021-01-09 RX ORDER — BUMETANIDE 2 MG/1
4 TABLET ORAL
Status: DISCONTINUED | OUTPATIENT
Start: 2021-01-09 | End: 2021-01-11 | Stop reason: HOSPADM

## 2021-01-09 RX ORDER — POTASSIUM CHLORIDE 750 MG/1
20 TABLET, EXTENDED RELEASE ORAL ONCE
Status: COMPLETED | OUTPATIENT
Start: 2021-01-09 | End: 2021-01-09

## 2021-01-09 RX ORDER — COLCHICINE 0.6 MG/1
0.6 TABLET ORAL DAILY
Status: DISCONTINUED | OUTPATIENT
Start: 2021-01-09 | End: 2021-01-09

## 2021-01-09 RX ORDER — WARFARIN SODIUM 7.5 MG/1
7.5 TABLET ORAL
Status: COMPLETED | OUTPATIENT
Start: 2021-01-09 | End: 2021-01-09

## 2021-01-09 RX ADMIN — INSULIN ASPART 4 UNITS: 100 INJECTION, SOLUTION INTRAVENOUS; SUBCUTANEOUS at 16:50

## 2021-01-09 RX ADMIN — AMIODARONE HYDROCHLORIDE 400 MG: 200 TABLET ORAL at 07:36

## 2021-01-09 RX ADMIN — POTASSIUM CHLORIDE 20 MEQ: 750 TABLET, EXTENDED RELEASE ORAL at 10:46

## 2021-01-09 RX ADMIN — PANTOPRAZOLE SODIUM 40 MG: 40 TABLET, DELAYED RELEASE ORAL at 07:37

## 2021-01-09 RX ADMIN — ALBUTEROL SULFATE 2 PUFF: 90 AEROSOL, METERED RESPIRATORY (INHALATION) at 11:08

## 2021-01-09 RX ADMIN — PREDNISONE 30 MG: 5 TABLET ORAL at 11:01

## 2021-01-09 RX ADMIN — MONTELUKAST 10 MG: 10 TABLET, FILM COATED ORAL at 22:18

## 2021-01-09 RX ADMIN — TRAMADOL HYDROCHLORIDE 50 MG: 50 TABLET, FILM COATED ORAL at 18:54

## 2021-01-09 RX ADMIN — UMECLIDINIUM 1 PUFF: 62.5 AEROSOL, POWDER ORAL at 07:38

## 2021-01-09 RX ADMIN — ISOSORBIDE DINITRATE 10 MG: 5 TABLET ORAL at 07:37

## 2021-01-09 RX ADMIN — TRAMADOL HYDROCHLORIDE 50 MG: 50 TABLET, FILM COATED ORAL at 10:46

## 2021-01-09 RX ADMIN — ACETAMINOPHEN 650 MG: 325 TABLET, FILM COATED ORAL at 10:47

## 2021-01-09 RX ADMIN — HYDRALAZINE HYDROCHLORIDE 75 MG: 25 TABLET ORAL at 07:38

## 2021-01-09 RX ADMIN — PREDNISONE 5 MG: 5 TABLET ORAL at 07:37

## 2021-01-09 RX ADMIN — INSULIN GLARGINE 6 UNITS: 100 INJECTION, SOLUTION SUBCUTANEOUS at 22:18

## 2021-01-09 RX ADMIN — GABAPENTIN 300 MG: 300 CAPSULE ORAL at 14:44

## 2021-01-09 RX ADMIN — FLUTICASONE FUROATE AND VILANTEROL TRIFENATATE 1 PUFF: 200; 25 POWDER RESPIRATORY (INHALATION) at 07:38

## 2021-01-09 RX ADMIN — ALLOPURINOL 300 MG: 300 TABLET ORAL at 07:37

## 2021-01-09 RX ADMIN — ACETAMINOPHEN 650 MG: 325 TABLET, FILM COATED ORAL at 18:54

## 2021-01-09 RX ADMIN — ZINC SULFATE 220 MG (50 MG) CAPSULE 220 MG: CAPSULE at 19:51

## 2021-01-09 RX ADMIN — ASPIRIN 81 MG CHEWABLE TABLET 81 MG: 81 TABLET CHEWABLE at 07:37

## 2021-01-09 RX ADMIN — POTASSIUM CHLORIDE 40 MEQ: 750 TABLET, EXTENDED RELEASE ORAL at 07:36

## 2021-01-09 RX ADMIN — HYDRALAZINE HYDROCHLORIDE 75 MG: 25 TABLET ORAL at 14:45

## 2021-01-09 RX ADMIN — GABAPENTIN 300 MG: 300 CAPSULE ORAL at 07:38

## 2021-01-09 RX ADMIN — HYDRALAZINE HYDROCHLORIDE 75 MG: 25 TABLET ORAL at 19:51

## 2021-01-09 RX ADMIN — BUPROPION HYDROCHLORIDE 150 MG: 150 TABLET, EXTENDED RELEASE ORAL at 07:37

## 2021-01-09 RX ADMIN — TRAMADOL HYDROCHLORIDE 50 MG: 50 TABLET, FILM COATED ORAL at 02:56

## 2021-01-09 RX ADMIN — ISOSORBIDE DINITRATE 10 MG: 5 TABLET ORAL at 19:50

## 2021-01-09 RX ADMIN — GABAPENTIN 600 MG: 600 TABLET, FILM COATED ORAL at 19:50

## 2021-01-09 RX ADMIN — INSULIN ASPART 1 UNITS: 100 INJECTION, SOLUTION INTRAVENOUS; SUBCUTANEOUS at 13:27

## 2021-01-09 RX ADMIN — ISOSORBIDE DINITRATE 10 MG: 5 TABLET ORAL at 14:44

## 2021-01-09 RX ADMIN — ZINC SULFATE 220 MG (50 MG) CAPSULE 220 MG: CAPSULE at 07:37

## 2021-01-09 RX ADMIN — ALBUTEROL SULFATE 2 PUFF: 90 AEROSOL, METERED RESPIRATORY (INHALATION) at 00:34

## 2021-01-09 RX ADMIN — BUMETANIDE 4 MG: 2 TABLET ORAL at 16:50

## 2021-01-09 RX ADMIN — WARFARIN SODIUM 7.5 MG: 7.5 TABLET ORAL at 18:47

## 2021-01-09 RX ADMIN — ACETAMINOPHEN 650 MG: 325 TABLET, FILM COATED ORAL at 02:56

## 2021-01-09 ASSESSMENT — ACTIVITIES OF DAILY LIVING (ADL)
ADLS_ACUITY_SCORE: 12

## 2021-01-09 ASSESSMENT — MIFFLIN-ST. JEOR: SCORE: 1752.9

## 2021-01-09 NOTE — PLAN OF CARE
Transfer Note    Transferred from:   Via: Bed  Reason for transfer: Pt status improving, requiring less intensive care  Family: Aware of transfer  Belongings: Sent with pt, remain at bedside, please see additional note containing list of belongings  Chart: Sent with pt  Medications: Meds from bin sent with pt  2 RN Skin check done on arrival to unit with REGINALD Li RN  9:13 PM

## 2021-01-09 NOTE — PROGRESS NOTES
Patient transferred to  from  with the following:    Phone    Boots  Socks  Pants  Scottie vest  Wallet  Red flannel shirt  Blue coat  Watch   Ring  LVAD bag  Glasses    Patient denies leaving anything at security.

## 2021-01-09 NOTE — PLAN OF CARE
D: Pt admit 1/7/21 for acute on chronic SHF admit for RHC and HF treatment. PMH NICM (EF 20%) s/p HM2 LVAD (6/19/17), metronic dual chamber ICD, Afib, CKD3, DM2, RV failure CECILIA ,obesity, HTN, COPD, asthma    I/A:   Neuro: A&Ox4. Bed alarm on for 24 hrs per fall prevention protocol as pt up from ICU last night. Pt set off bed alarm x1, compliant with calling for staff assistance since.  VS: VSS. RA. Hospital CPAP in room for overnight use, pt refused CPAP last night.   LVAD #'s WNL, no alarms this shift. Pt has weekly dressing changes next due 1/14/21  Tele: paced  Pain: Pt c/o severe gout pain in L knee. Heat packs secured to area w/ACE bandage, PRN tramadol x2, PRN tylenol x2 with little to no relief. Pt unable to get any sleep d/t pain overnight. Cross cover notified.   GI/: Urinating adequately. No BM this shift.  Diet: 2g Na and Moderate consistent CHO diet. 1.8L FR  BG/insulin: ACHS BG checks WNL   IV/Drips: L PIV SL  Activity: SBA  Skin/drains: Pt skin intact. Pt has asymptomatic generalized rash on shoulders pt states this as baseline x3+ months.     P: Plan to diurese/control gout pain. Continue to monitor pt status and report changes to Cards 2.     Erna Rodriguez RN

## 2021-01-09 NOTE — PROGRESS NOTES
[facility] Cardiology Progress Note           Assessment and Plan:   This patient is a 65 year old with PMH of NICM (EF 20%) s/p HM II LVAD placement (6/19/17) at [facility] (obesity) metronic dual chamber ICD. Other relavant history includes atrial fibrillation, CKD stage III, DM type 2, RV failure, CECILIA, obesity, hypertension, COPD, asthma, here with acute on chronic systolic heart failure, admitted for RHC and heart failure treatment.     Changes 1/9  - hold Bumex since yesterday evening  - start Bumex 4 mg PO BID today  - continue speed at 9600  - start prednisone 30 mg for gouty arthritis flare   - plan Monday 1/11 consults:   1.  EP, regarding amiodarone dosage & duration   2.  Rheum, regarding prophylactic medication for gouty arthritis flare   - possible discharge on Monday    #NICM (EF 20%) s/p HM II LVAD placement (6/19/17) at [facility] (obesity)  #Hypervolemic status   On initial presentation his MAP~, CVP 18 on lying position, slightly volume up on physical exam. LVAD setting: flow 6, speed 9400, pulse index 5.0, pump power 6.2. (baseline). Echo as showed below.    Responded well to diuretic and increase in speed to 9600 yesterday. Today JVP<5, denies any chest pain, SOB, or lightheadedness. Actually feels better after diuresis. Will continue him with home diuretics; Bumex 4mg BID. Left knee pain likely from gouty arthritis, will start him on prednisone. Plan to consult EP and Rheum on Monday and possible discharge if able.     - start Bumex 4 mg PO BID  - continue LVAD speed at 9600  - BMP q12h   - heart failure management    Afterload reduction: increase hydralazine to 75 TID, add isosorbide dinitrate 10 mg TID;              MAP goal: 80 mmHg   CCB: on amiodarone 400 mg daily, not on BB   Diuretic: Bumex 4 mg PO BID   Antiplatelet: ASA, Anticoagulation: Warfarin   SCD: Metronic dual chamber ICD     #WALTER on CKD  Has been trending up and down in the past 6 months. Cardiorenal vs prerenal from  diuresis. Current creatinine is at baseline.  - trend BMP  - monitor I/O    #Gouty arthritis flare  Acute left knee pain and tenderness. Similar to gouty attack per patient.  - start him on prednisone 30 mg PO as previously prescribed by his rheumatologist  - how ever has had 2 attacks in the past 4 months   - will consult rheum on Monday for input regarding other prophylactic medication other than prednisone    #H/o atrial fibrillation on warfarin  #H/o VT/VF on amiodarone  On coumadin. Current INR 2.64. Warfarin held prior to RHC.  - Resume warfarin after RHC  - Pharmacy to dose warfarin  - continue amiodarone 400 mg daily(since 06/2020 after an episode of ICD shock showed VT)  - will consult EP on Monday regarding amiodarone duration and dose.    Chronic conditions  DM type 2: glargine 6 unit, Medium dose insuline sliding scale  Hypertension: Hydralazine 75 mg TID(increase from 50 mg TID)  COPD/Asthma:  Scheduled Breo Ellipta. Continue albuterol prn  CECILIA: continue CPAP during bedtime  Psychiatric condition: Bupropion    Diet: moderate carb consistent  Fluid: restriction 1.8L/day  DVT prophylaxis: on warfarin   Code status: full  Disposition: discharge to home in 2-3 days after euvolemic status is achieved and LVAD speed is optimized.    Patient staffed with Dr. Conte.    Ml Hull MD  Internal Medicine, PGY-1      I have reviewed today's vital signs, notes, medications, labs and imaging. I have also seen and examined the patient and agree with the findings and plan as outlined above.    Rose Conte MD  Section Head - Advanced Heart Failure, Transplantation and Mechanical Circulatory Support  Director - Adult Congenital and Cardiovascular [facility]etics [facility]  Associate Professor of Medicine, [facility]        Interval History:   Feels better overall. But still has pain in his left arm and worsening left knee pain from gouty arthritis. Feels less bloated in his abdomen. His feet are  dry. Otherwise, denies any fever/chills, chest pain, SOB, dizziness, and lightheadedness.          Review of Systems:   The Review of Systems is negative other than noted in the HPI          Medications:       allopurinol  300 mg Oral Daily     amiodarone  400 mg Oral Daily     aspirin  81 mg Oral or Feeding Tube Daily     bumetanide  4 mg Oral BID     buPROPion  150 mg Oral QAM     fluticasone-vilanterol  1 puff Inhalation Daily     gabapentin  300 mg Oral 2 times per day     gabapentin  600 mg Oral Daily     hydrALAZINE  75 mg Oral TID     insulin aspart  1-7 Units Subcutaneous TID AC     insulin aspart  1-5 Units Subcutaneous At Bedtime     insulin glargine  6 Units Subcutaneous At Bedtime     isosorbide dinitrate  10 mg Oral TID     montelukast  10 mg Oral At Bedtime     pantoprazole  40 mg Oral QAM     potassium chloride ER  40 mEq Oral Daily     predniSONE  30 mg Oral Daily     predniSONE  5 mg Oral Daily     umeclidinium  1 puff Inhalation Daily     warfarin ANTICOAGULANT  7.5 mg Oral ONCE at 18:00     zinc sulfate  220 mg Oral BID             Physical Exam (Resident / Clinician):   Vitals were reviewed  Temp: 99.2  F (37.3  C) Temp src: Oral BP: 97/76 Pulse: 78   Resp: 16 SpO2: 97 % O2 Device: None (Room air)     Weight down from 228-->223 lbs  I/O net negative ~7L in 4 shift (24hr+morning shift)  Hemodynamic this AM: CVP 10, PA 36/20/25, PCWP 20, CI 2, CO 4.46, SVR 1255, PVR 1.1  LVAD: Flow 4.57, PI 3.9-6.6, speed 9400, Power 5.4-6.8    GA: NAD  HEENT: swan on the right side, JVP~ 5  Lungs: clear on auscultation both lungs  CVS: LVAD hums, no peripheral edema  Abd: normal bowel sounds, soft, nontender  Ext: no joint swelling or tenderness  Neuro: A&O, moving independently, otherwise grossly intact           Data:     Results for orders placed or performed during the hospital encounter of 01/07/21 (from the past 24 hour(s))   Glucose by meter   Result Value Ref Range    Glucose 78 70 - 99 mg/dL   Glucose by  meter   Result Value Ref Range    Glucose 117 (H) 70 - 99 mg/dL   Glucose by meter   Result Value Ref Range    Glucose 110 (H) 70 - 99 mg/dL   CBC with platelets   Result Value Ref Range    WBC 5.8 4.0 - 11.0 10e9/L    RBC Count 3.87 (L) 4.4 - 5.9 10e12/L    Hemoglobin 9.7 (L) 13.3 - 17.7 g/dL    Hematocrit 32.5 (L) 40.0 - 53.0 %    MCV 84 78 - 100 fl    MCH 25.1 (L) 26.5 - 33.0 pg    MCHC 29.8 (L) 31.5 - 36.5 g/dL    RDW 18.8 (H) 10.0 - 15.0 %    Platelet Count 219 150 - 450 10e9/L   INR   Result Value Ref Range    INR 2.64 (H) 0.86 - 1.14   Uric acid   Result Value Ref Range    Uric Acid 5.0 3.5 - 7.2 mg/dL   Basic metabolic panel   Result Value Ref Range    Sodium 138 133 - 144 mmol/L    Potassium 3.8 3.4 - 5.3 mmol/L    Chloride 102 94 - 109 mmol/L    Carbon Dioxide 29 20 - 32 mmol/L    Anion Gap 7 3 - 14 mmol/L    Glucose 93 70 - 99 mg/dL    Urea Nitrogen 50 (H) 7 - 30 mg/dL    Creatinine 1.86 (H) 0.66 - 1.25 mg/dL    GFR Estimate 38 (L) >60 mL/min/[1.73_m2]    GFR Estimate If Black 43 (L) >60 mL/min/[1.73_m2]    Calcium 8.3 (L) 8.5 - 10.1 mg/dL   Lactate Dehydrogenase   Result Value Ref Range    Lactate Dehydrogenase 363 (H) 85 - 227 U/L   Magnesium   Result Value Ref Range    Magnesium 2.5 (H) 1.6 - 2.3 mg/dL   Glucose by meter   Result Value Ref Range    Glucose 102 (H) 70 - 99 mg/dL   Phosphorus   Result Value Ref Range    Phosphorus 3.3 2.5 - 4.5 mg/dL   Glucose by meter   Result Value Ref Range    Glucose 156 (H) 70 - 99 mg/dL   Glucose by meter   Result Value Ref Range    Glucose 320 (H) 70 - 99 mg/dL     *Note: Due to a large number of results and/or encounters for the requested time period, some results have not been displayed. A complete set of results can be found in Results Review.        Echocardiology 1/8  HM2 at 9400RPM.  Severely (EF <30%) reduced left ventricular function is present. Moderate left  ventricular dilation is present. LVIDd 68mm. Septum midline.  RV is not well visulized. At least  mildly reduced.  Aortic valve remain closed with no AI. Normal inflow velocities (77 cm/s).  Outflow velocity cannot be assessed.  Mild to moderate eccentric mitral insufficiency is present.  Dilation of the inferior vena cava is present with abnormal respiratory  variation in diameter.  IVC diameter >2.1 cm collapsing <50% with sniff suggests a high RA pressure  estimated at 15 mmHg or greater.     This study was compared with the study from 9/19/2020 .LVIDd (previous study  63 mm) enlarged further. Otherwise no significant change.

## 2021-01-10 LAB
ANION GAP SERPL CALCULATED.3IONS-SCNC: 5 MMOL/L (ref 3–14)
ANION GAP SERPL CALCULATED.3IONS-SCNC: 6 MMOL/L (ref 3–14)
ANION GAP SERPL CALCULATED.3IONS-SCNC: 7 MMOL/L (ref 3–14)
ANION GAP SERPL CALCULATED.3IONS-SCNC: 7 MMOL/L (ref 3–14)
BUN SERPL-MCNC: 44 MG/DL (ref 7–30)
BUN SERPL-MCNC: 44 MG/DL (ref 7–30)
BUN SERPL-MCNC: 46 MG/DL (ref 7–30)
BUN SERPL-MCNC: 49 MG/DL (ref 7–30)
CALCIUM SERPL-MCNC: 8.3 MG/DL (ref 8.5–10.1)
CALCIUM SERPL-MCNC: 8.4 MG/DL (ref 8.5–10.1)
CALCIUM SERPL-MCNC: 8.5 MG/DL (ref 8.5–10.1)
CALCIUM SERPL-MCNC: 8.5 MG/DL (ref 8.5–10.1)
CHLORIDE SERPL-SCNC: 100 MMOL/L (ref 94–109)
CHLORIDE SERPL-SCNC: 100 MMOL/L (ref 94–109)
CHLORIDE SERPL-SCNC: 98 MMOL/L (ref 94–109)
CHLORIDE SERPL-SCNC: 99 MMOL/L (ref 94–109)
CO2 SERPL-SCNC: 28 MMOL/L (ref 20–32)
CO2 SERPL-SCNC: 28 MMOL/L (ref 20–32)
CO2 SERPL-SCNC: 29 MMOL/L (ref 20–32)
CO2 SERPL-SCNC: 30 MMOL/L (ref 20–32)
CREAT SERPL-MCNC: 1.69 MG/DL (ref 0.66–1.25)
CREAT SERPL-MCNC: 1.7 MG/DL (ref 0.66–1.25)
CREAT SERPL-MCNC: 1.71 MG/DL (ref 0.66–1.25)
CREAT SERPL-MCNC: 1.83 MG/DL (ref 0.66–1.25)
GFR SERPL CREATININE-BSD FRML MDRD: 38 ML/MIN/{1.73_M2}
GFR SERPL CREATININE-BSD FRML MDRD: 42 ML/MIN/{1.73_M2}
GLUCOSE BLDC GLUCOMTR-MCNC: 115 MG/DL (ref 70–99)
GLUCOSE BLDC GLUCOMTR-MCNC: 167 MG/DL (ref 70–99)
GLUCOSE BLDC GLUCOMTR-MCNC: 169 MG/DL (ref 70–99)
GLUCOSE BLDC GLUCOMTR-MCNC: 177 MG/DL (ref 70–99)
GLUCOSE BLDC GLUCOMTR-MCNC: 216 MG/DL (ref 70–99)
GLUCOSE SERPL-MCNC: 113 MG/DL (ref 70–99)
GLUCOSE SERPL-MCNC: 162 MG/DL (ref 70–99)
GLUCOSE SERPL-MCNC: 168 MG/DL (ref 70–99)
GLUCOSE SERPL-MCNC: 210 MG/DL (ref 70–99)
INR PPP: 2.36 (ref 0.86–1.14)
MAGNESIUM SERPL-MCNC: 2.7 MG/DL (ref 1.6–2.3)
PHOSPHATE SERPL-MCNC: 3.6 MG/DL (ref 2.5–4.5)
POTASSIUM SERPL-SCNC: 3.7 MMOL/L (ref 3.4–5.3)
POTASSIUM SERPL-SCNC: 3.7 MMOL/L (ref 3.4–5.3)
POTASSIUM SERPL-SCNC: 4.4 MMOL/L (ref 3.4–5.3)
POTASSIUM SERPL-SCNC: 4.6 MMOL/L (ref 3.4–5.3)
SODIUM SERPL-SCNC: 133 MMOL/L (ref 133–144)
SODIUM SERPL-SCNC: 134 MMOL/L (ref 133–144)
SODIUM SERPL-SCNC: 135 MMOL/L (ref 133–144)
SODIUM SERPL-SCNC: 135 MMOL/L (ref 133–144)

## 2021-01-10 PROCEDURE — 94660 CPAP INITIATION&MGMT: CPT

## 2021-01-10 PROCEDURE — 36415 COLL VENOUS BLD VENIPUNCTURE: CPT | Performed by: STUDENT IN AN ORGANIZED HEALTH CARE EDUCATION/TRAINING PROGRAM

## 2021-01-10 PROCEDURE — 250N000013 HC RX MED GY IP 250 OP 250 PS 637: Performed by: INTERNAL MEDICINE

## 2021-01-10 PROCEDURE — 80048 BASIC METABOLIC PNL TOTAL CA: CPT | Performed by: INTERNAL MEDICINE

## 2021-01-10 PROCEDURE — 999N000157 HC STATISTIC RCP TIME EA 10 MIN

## 2021-01-10 PROCEDURE — 83735 ASSAY OF MAGNESIUM: CPT | Performed by: INTERNAL MEDICINE

## 2021-01-10 PROCEDURE — 36415 COLL VENOUS BLD VENIPUNCTURE: CPT | Performed by: INTERNAL MEDICINE

## 2021-01-10 PROCEDURE — 999N001017 HC STATISTIC GLUCOSE BY METER IP

## 2021-01-10 PROCEDURE — 250N000012 HC RX MED GY IP 250 OP 636 PS 637: Performed by: INTERNAL MEDICINE

## 2021-01-10 PROCEDURE — 250N000013 HC RX MED GY IP 250 OP 250 PS 637

## 2021-01-10 PROCEDURE — 250N000012 HC RX MED GY IP 250 OP 636 PS 637: Performed by: STUDENT IN AN ORGANIZED HEALTH CARE EDUCATION/TRAINING PROGRAM

## 2021-01-10 PROCEDURE — 85610 PROTHROMBIN TIME: CPT | Performed by: INTERNAL MEDICINE

## 2021-01-10 PROCEDURE — 80048 BASIC METABOLIC PNL TOTAL CA: CPT | Performed by: STUDENT IN AN ORGANIZED HEALTH CARE EDUCATION/TRAINING PROGRAM

## 2021-01-10 PROCEDURE — 214N000001 HC R&B CCU UMMC

## 2021-01-10 PROCEDURE — 99232 SBSQ HOSP IP/OBS MODERATE 35: CPT | Mod: GC | Performed by: INTERNAL MEDICINE

## 2021-01-10 PROCEDURE — 250N000013 HC RX MED GY IP 250 OP 250 PS 637: Performed by: STUDENT IN AN ORGANIZED HEALTH CARE EDUCATION/TRAINING PROGRAM

## 2021-01-10 PROCEDURE — 84100 ASSAY OF PHOSPHORUS: CPT | Performed by: INTERNAL MEDICINE

## 2021-01-10 RX ORDER — POTASSIUM CHLORIDE 750 MG/1
20 TABLET, EXTENDED RELEASE ORAL ONCE
Status: COMPLETED | OUTPATIENT
Start: 2021-01-10 | End: 2021-01-10

## 2021-01-10 RX ORDER — HYDRALAZINE HYDROCHLORIDE 25 MG/1
75 TABLET, FILM COATED ORAL 3 TIMES DAILY
Qty: 270 TABLET | Refills: 11 | Status: CANCELLED | OUTPATIENT
Start: 2021-01-10 | End: 2022-01-02

## 2021-01-10 RX ORDER — WARFARIN SODIUM 7.5 MG/1
7.5 TABLET ORAL
Status: COMPLETED | OUTPATIENT
Start: 2021-01-10 | End: 2021-01-10

## 2021-01-10 RX ADMIN — BUPROPION HYDROCHLORIDE 150 MG: 150 TABLET, EXTENDED RELEASE ORAL at 07:54

## 2021-01-10 RX ADMIN — POTASSIUM CHLORIDE 20 MEQ: 750 TABLET, EXTENDED RELEASE ORAL at 11:24

## 2021-01-10 RX ADMIN — INSULIN ASPART 1 UNITS: 100 INJECTION, SOLUTION INTRAVENOUS; SUBCUTANEOUS at 12:32

## 2021-01-10 RX ADMIN — AMIODARONE HYDROCHLORIDE 400 MG: 200 TABLET ORAL at 07:54

## 2021-01-10 RX ADMIN — ZINC SULFATE 220 MG (50 MG) CAPSULE 220 MG: CAPSULE at 20:22

## 2021-01-10 RX ADMIN — ISOSORBIDE DINITRATE 10 MG: 5 TABLET ORAL at 20:22

## 2021-01-10 RX ADMIN — MONTELUKAST 10 MG: 10 TABLET, FILM COATED ORAL at 22:19

## 2021-01-10 RX ADMIN — BUMETANIDE 4 MG: 2 TABLET ORAL at 15:07

## 2021-01-10 RX ADMIN — ACETAMINOPHEN 650 MG: 325 TABLET, FILM COATED ORAL at 15:11

## 2021-01-10 RX ADMIN — PREDNISONE 30 MG: 5 TABLET ORAL at 07:54

## 2021-01-10 RX ADMIN — TRAMADOL HYDROCHLORIDE 50 MG: 50 TABLET, FILM COATED ORAL at 15:11

## 2021-01-10 RX ADMIN — UMECLIDINIUM 1 PUFF: 62.5 AEROSOL, POWDER ORAL at 07:54

## 2021-01-10 RX ADMIN — ALLOPURINOL 300 MG: 300 TABLET ORAL at 07:55

## 2021-01-10 RX ADMIN — WARFARIN SODIUM 7.5 MG: 7.5 TABLET ORAL at 17:11

## 2021-01-10 RX ADMIN — PANTOPRAZOLE SODIUM 40 MG: 40 TABLET, DELAYED RELEASE ORAL at 07:55

## 2021-01-10 RX ADMIN — HYDRALAZINE HYDROCHLORIDE 75 MG: 25 TABLET ORAL at 15:07

## 2021-01-10 RX ADMIN — GABAPENTIN 600 MG: 600 TABLET, FILM COATED ORAL at 20:22

## 2021-01-10 RX ADMIN — GABAPENTIN 300 MG: 300 CAPSULE ORAL at 15:07

## 2021-01-10 RX ADMIN — ZINC SULFATE 220 MG (50 MG) CAPSULE 220 MG: CAPSULE at 07:55

## 2021-01-10 RX ADMIN — INSULIN GLARGINE 6 UNITS: 100 INJECTION, SOLUTION SUBCUTANEOUS at 22:18

## 2021-01-10 RX ADMIN — POTASSIUM CHLORIDE 40 MEQ: 750 TABLET, EXTENDED RELEASE ORAL at 07:54

## 2021-01-10 RX ADMIN — ISOSORBIDE DINITRATE 10 MG: 5 TABLET ORAL at 15:07

## 2021-01-10 RX ADMIN — GABAPENTIN 300 MG: 300 CAPSULE ORAL at 07:54

## 2021-01-10 RX ADMIN — INSULIN ASPART 2 UNITS: 100 INJECTION, SOLUTION INTRAVENOUS; SUBCUTANEOUS at 17:12

## 2021-01-10 RX ADMIN — FLUTICASONE FUROATE AND VILANTEROL TRIFENATATE 1 PUFF: 200; 25 POWDER RESPIRATORY (INHALATION) at 07:54

## 2021-01-10 RX ADMIN — HYDRALAZINE HYDROCHLORIDE 75 MG: 25 TABLET ORAL at 20:22

## 2021-01-10 RX ADMIN — HYDRALAZINE HYDROCHLORIDE 75 MG: 25 TABLET ORAL at 07:55

## 2021-01-10 RX ADMIN — BUMETANIDE 4 MG: 2 TABLET ORAL at 07:55

## 2021-01-10 RX ADMIN — ASPIRIN 81 MG CHEWABLE TABLET 81 MG: 81 TABLET CHEWABLE at 07:55

## 2021-01-10 RX ADMIN — ISOSORBIDE DINITRATE 10 MG: 5 TABLET ORAL at 07:54

## 2021-01-10 ASSESSMENT — ACTIVITIES OF DAILY LIVING (ADL)
ADLS_ACUITY_SCORE: 12

## 2021-01-10 ASSESSMENT — MIFFLIN-ST. JEOR: SCORE: 1743.37

## 2021-01-10 NOTE — PROGRESS NOTES
[facility] Cardiology Progress Note           Assessment and Plan:   This patient is a 65 year old with PMH of NICM (EF 20%) s/p HM II LVAD placement (6/19/17) at [facility] (obesity) metronic dual chamber ICD. Other relavant history includes atrial fibrillation, CKD stage III, DM type 2, RV failure, CECILIA, obesity, hypertension, COPD, asthma, here with acute on chronic systolic heart failure, admitted for RHC and optimization of heart failure management.     Changes 1/10  - continue Bumex 4 mg PO BID + potassium replacement 40 mEq daily  - continue speed at 9600  - continue prednisone 30 mg(day 2) for gouty arthritis flare   - plan Monday 1/11 consults:   1.  EP, regarding amiodarone dosage & duration   2.  Rheum, regarding treatment plan for current flare and prophylactic medication to prevent future flare   - potential discharge Monday 1/11    #NICM (EF 20%) s/p HM II LVAD placement (6/19/17) at [facility] (obesity)  #Hypervolemic status   On initial presentation his MAP~, CVP 18 on lying position, slightly volume up on physical exam. LVAD setting: flow 6, speed 9400, pulse index 5.0, pump power 6.2. (baseline). Echo as showed below. RHC with mRA 17, mPA30, PCWP 25, MICHELLE CI  2.5.    Responded well to diuresis with Bumex drip and increase of LVAD speed to 9600 1/8. Maintain appropriate response after switch to oral Bumex 4 mg PO BID 1/9. Feels much improved after diuresis and currently thought to be at euvolemic with JVP 7 1/10. Will continue him with Bumex 4 mg PO BID. Left knee pain likely from flare of gout worsened after diuresis but has improved with prednisone 30 mg daily given first dose on 1/9. Plan to place Monday consults for:  1.  EP, regarding amiodarone dosage & duration; currently on amiodarone 400 mg   2.  Rheum, regarding treatment plan for current flare and prophylactic medication to prevent future flare   Discharge if plans are established.    - continue Bumex 4 mg PO BID + potassium 40 mEq PO  daily(not home dose)  - continue LVAD speed at 9600  - BMP q12h  - heart failure management    Afterload reduction:     increase hydralazine to 75 TID               isosorbide dinitrate 10 mg TID;     MAP goal: 80 mmHg   Diuretic: Bumex 4 mg PO BID   Antiplatelet: ASA, Anticoagulation: Warfarin              on amiodarone 400 mg daily, not on BB   SCD: Metronic dual chamber ICD     #WALTER on CKD  Has been trending up and down in the past 6 months. Cardiorenal vs prerenal from diuresis. Current creatinine is at baseline.  - trend BMP  - monitor I/O    #Gouty arthritis flare  Acute left knee pain and tenderness. Similar to gouty attack per patient. Has had 2 attacks in the past 4 months   - start him on prednisone 30 mg PO 1/9 as previously prescribed by his rheumatologist, currently on second dose  - will consult rheum on Monday for input regarding plan and other prophylactic medication other than prednisone    #Left arm pain  Has been there for a week. Worsening in the hospital. Pain on flexion and extension of his left elbow. No fever. Likely muscle spasm vs tendinitis in etiology.   - consider topical medications + warm compress  - physical therapist consulted for evaluation & treatment recs    #H/o atrial fibrillation on warfarin  #H/o VT/VF on amiodarone  On coumadin. Current INR 2.36.  - Resume warfarin after RHC  - Pharmacy to dose warfarin  - continue amiodarone 400 mg daily  - consult EP on Monday 1/11 regarding amiodarone duration and dose.    Chronic conditions  DM type 2: glargine 6 unit, Medium dose insuline sliding scale  Hypertension: Hydralazine 75 mg TID(increase from 50 mg TID)  COPD/Asthma:  Scheduled Breo Ellipta. Continue albuterol prn  CECILIA: continue CPAP during bedtime  Psychiatric condition: Bupropion    Diet: moderate carb consistent  Fluid: restriction 1.8L/day  DVT prophylaxis: on warfarin   Code status: full  Disposition: discharge to home in 1-2 days after euvolemic status is achieved and  LVAD speed is optimized.    Patient staffed with Dr. Conte.    Ml Hull MD  Internal Medicine, PGY-1      I have reviewed today's vital signs, notes, medications, labs and imaging. I have also seen and examined the patient and agree with the findings and plan as outlined above.    Rose Conte MD  Section Head - Advanced Heart Failure, Transplantation and Mechanical Circulatory Support  Director - Adult Congenital and Cardiovascular Genetics Center  Associate Professor of Medicine, [facility]        Interval History:   Feels better overall. But still has pain in his left arm and better left knee pain 10/10 to 2/10 after started on steroid. Feels less bloated in his abdomen. His feet are dry. Otherwise, denies any fever/chills, chest pain, SOB, dizziness, and lightheadedness.          Review of Systems:   The Review of Systems is negative other than noted in the HPI          Medications:       allopurinol  300 mg Oral Daily     amiodarone  400 mg Oral Daily     aspirin  81 mg Oral or Feeding Tube Daily     bumetanide  4 mg Oral BID     buPROPion  150 mg Oral QAM     fluticasone-vilanterol  1 puff Inhalation Daily     gabapentin  300 mg Oral 2 times per day     gabapentin  600 mg Oral Daily     hydrALAZINE  75 mg Oral TID     insulin aspart  1-7 Units Subcutaneous TID AC     insulin aspart  1-5 Units Subcutaneous At Bedtime     insulin glargine  6 Units Subcutaneous At Bedtime     isosorbide dinitrate  10 mg Oral TID     montelukast  10 mg Oral At Bedtime     pantoprazole  40 mg Oral QAM     potassium chloride ER  40 mEq Oral Daily     predniSONE  30 mg Oral Daily     predniSONE  5 mg Oral Daily     umeclidinium  1 puff Inhalation Daily     warfarin ANTICOAGULANT  7.5 mg Oral ONCE at 18:00     zinc sulfate  220 mg Oral BID             Physical Exam (Resident / Clinician):   Vitals were reviewed  Temp: 98.4  F (36.9  C) Temp src: Oral BP: (!) 80/63 Pulse: 79   Resp: 18 SpO2: 99 % O2  Device: None (Room air)     Weight down from 216-->214 lbs  LVAD: Flow 6-7.8, PI 2.8-4.5, speed 9600, Power 6.5-7.4    GA: NAD  HEENT: swan on the right side, JVP~ 7  Lungs: clear on auscultation both lungs  CVS: LVAD hums, no peripheral edema  Abd: normal bowel sounds, soft, nontender  Ext: left upper arm tenderness along left bicep muscle. no limit range of motion at shoulder / elbow. Overlying erythema, no superficial tenderness or signs of cellulitis.  Neuro: A&O, moving independently, otherwise grossly intact           Data:     Results for orders placed or performed during the hospital encounter of 01/07/21 (from the past 24 hour(s))   Glucose by meter   Result Value Ref Range    Glucose 320 (H) 70 - 99 mg/dL   Basic metabolic panel   Result Value Ref Range    Sodium 130 (L) 133 - 144 mmol/L    Potassium 4.9 3.4 - 5.3 mmol/L    Chloride 98 94 - 109 mmol/L    Carbon Dioxide 29 20 - 32 mmol/L    Anion Gap 3 3 - 14 mmol/L    Glucose 276 (H) 70 - 99 mg/dL    Urea Nitrogen 50 (H) 7 - 30 mg/dL    Creatinine 1.85 (H) 0.66 - 1.25 mg/dL    GFR Estimate 38 (L) >60 mL/min/[1.73_m2]    GFR Estimate If Black 44 (L) >60 mL/min/[1.73_m2]    Calcium 8.3 (L) 8.5 - 10.1 mg/dL   Glucose by meter   Result Value Ref Range    Glucose 165 (H) 70 - 99 mg/dL   Glucose by meter   Result Value Ref Range    Glucose 177 (H) 70 - 99 mg/dL   Basic metabolic panel   Result Value Ref Range    Sodium 135 133 - 144 mmol/L    Potassium 3.7 3.4 - 5.3 mmol/L    Chloride 100 94 - 109 mmol/L    Carbon Dioxide 30 20 - 32 mmol/L    Anion Gap 5 3 - 14 mmol/L    Glucose 162 (H) 70 - 99 mg/dL    Urea Nitrogen 46 (H) 7 - 30 mg/dL    Creatinine 1.70 (H) 0.66 - 1.25 mg/dL    GFR Estimate 42 (L) >60 mL/min/[1.73_m2]    GFR Estimate If Black 48 (L) >60 mL/min/[1.73_m2]    Calcium 8.4 (L) 8.5 - 10.1 mg/dL   INR   Result Value Ref Range    INR 2.36 (H) 0.86 - 1.14   Magnesium   Result Value Ref Range    Magnesium 2.7 (H) 1.6 - 2.3 mg/dL   Phosphorus   Result  Value Ref Range    Phosphorus 3.6 2.5 - 4.5 mg/dL   Basic metabolic panel   Result Value Ref Range    Sodium 135 133 - 144 mmol/L    Potassium 3.7 3.4 - 5.3 mmol/L    Chloride 100 94 - 109 mmol/L    Carbon Dioxide 29 20 - 32 mmol/L    Anion Gap 6 3 - 14 mmol/L    Glucose 113 (H) 70 - 99 mg/dL    Urea Nitrogen 44 (H) 7 - 30 mg/dL    Creatinine 1.69 (H) 0.66 - 1.25 mg/dL    GFR Estimate 42 (L) >60 mL/min/[1.73_m2]    GFR Estimate If Black 49 (L) >60 mL/min/[1.73_m2]    Calcium 8.5 8.5 - 10.1 mg/dL   Glucose by meter   Result Value Ref Range    Glucose 115 (H) 70 - 99 mg/dL   Glucose by meter   Result Value Ref Range    Glucose 167 (H) 70 - 99 mg/dL   Basic metabolic panel   Result Value Ref Range    Sodium 134 133 - 144 mmol/L    Potassium 4.4 3.4 - 5.3 mmol/L    Chloride 99 94 - 109 mmol/L    Carbon Dioxide 28 20 - 32 mmol/L    Anion Gap 7 3 - 14 mmol/L    Glucose 168 (H) 70 - 99 mg/dL    Urea Nitrogen 44 (H) 7 - 30 mg/dL    Creatinine 1.71 (H) 0.66 - 1.25 mg/dL    GFR Estimate 42 (L) >60 mL/min/[1.73_m2]    GFR Estimate If Black 48 (L) >60 mL/min/[1.73_m2]    Calcium 8.3 (L) 8.5 - 10.1 mg/dL     *Note: Due to a large number of results and/or encounters for the requested time period, some results have not been displayed. A complete set of results can be found in Results Review.        Echocardiology 1/8  HM2 at 9400RPM.  Severely (EF <30%) reduced left ventricular function is present. Moderate left  ventricular dilation is present. LVIDd 68mm. Septum midline.  RV is not well visulized. At least mildly reduced.  Aortic valve remain closed with no AI. Normal inflow velocities (77 cm/s).  Outflow velocity cannot be assessed.  Mild to moderate eccentric mitral insufficiency is present.  Dilation of the inferior vena cava is present with abnormal respiratory  variation in diameter.  IVC diameter >2.1 cm collapsing <50% with sniff suggests a high RA pressure  estimated at 15 mmHg or greater.     This study was compared  with the study from 9/19/2020 .LVIDd (previous study  63 mm) enlarged further. Otherwise no significant change.

## 2021-01-10 NOTE — PLAN OF CARE
D: Pt admit 1/7/21 for acute on chronic [facility]F admit for RHC and HF treatment. Hx of NICM (EF 20%) s/p HM2 LVAD (6/19/17), metronic dual chamber ICD, Afib, CKD3, DM2, RV failure CECILIA ,obesity, HTN, COPD, and asthma.    I: Monitored vitals and assessed pt status.   Changed: Gave 30 mg of prednisone for gout.   PRN: Tylenol and tramadol.     A: A0x4. VSS on RA. Afebrile. Urinating adequately. Pt c/o severe knee pain, likely due to gout, colchicine not an options for treatment, pt stated that medications not helping. Currently using hot pad and that helped with the pain.       P: Continue to monitor Pt status and report changes to cards 2.

## 2021-01-10 NOTE — PLAN OF CARE
D: Pt admit 1/7/21 for acute on chronic SHF admit for RHC and HF treatment. PMH NICM (EF 20%) s/p HM2 LVAD (6/19/17), metronic dual chamber ICD, Afib, CKD3, DM2, RV failure CECILIA ,obesity, HTN, COPD, asthma     I/A:   Neuro: A&Ox4.   VS: VSS. RA/hospital CPAP overnight  LVAD #'s WNL, no alarms this shift. Pt has weekly dressing changes next due 1/14/21  Tele: paced  Pain: Pt endorsed gout pain in L knee managed with aqua K heating pad secured to pt knee with ACE bandage  GI/: Urinating adequately. No BM this shift.  Diet: 2g Na and Moderate consistent CHO diet. 1.8L FR. Reinforcement on fluid restriction provided  BG/insulin: ACHS BG checks WNL   IV/Drips: L PIV SL  Activity: SBA  Skin/drains: Pt skin intact. Pt has asymptomatic generalized rash on shoulders pt states this as baseline x3+ months.      P: Plan for continued diuresing and possible discharge Monday. Continue to monitor pt status and report changes to Cards 2.      Erna Rodriguez RN

## 2021-01-10 NOTE — PLAN OF CARE
D: Pt admit 1/7/21 for acute on chronic SHF admit for RHC and HF treatment. Hx of NICM (EF 20%) s/p HM2 LVAD (6/19/17), metronic dual chamber ICD, Afib, CKD3, DM2, RV failure CECILIA ,obesity, HTN, COPD, and asthma.     I: Monitored vitals and assessed pt status.   PRN: Tylenol and tramadol.      A: A0x4. VSS on RA. Afebrile. Urinating adequately. Pt c/o severe knee pain, medications not helping. Currently using hot pad and that helped with the pain.        P: Continue to monitor Pt status and report changes to cards 2.

## 2021-01-11 VITALS
WEIGHT: 209.6 LBS | BODY MASS INDEX: 31.77 KG/M2 | HEART RATE: 76 BPM | SYSTOLIC BLOOD PRESSURE: 95 MMHG | HEIGHT: 68 IN | RESPIRATION RATE: 18 BRPM | TEMPERATURE: 98.5 F | DIASTOLIC BLOOD PRESSURE: 64 MMHG | OXYGEN SATURATION: 97 %

## 2021-01-11 LAB
ANION GAP SERPL CALCULATED.3IONS-SCNC: 8 MMOL/L (ref 3–14)
BUN SERPL-MCNC: 50 MG/DL (ref 7–30)
CALCIUM SERPL-MCNC: 8.6 MG/DL (ref 8.5–10.1)
CHLORIDE SERPL-SCNC: 99 MMOL/L (ref 94–109)
CO2 SERPL-SCNC: 28 MMOL/L (ref 20–32)
CREAT SERPL-MCNC: 1.69 MG/DL (ref 0.66–1.25)
GFR SERPL CREATININE-BSD FRML MDRD: 42 ML/MIN/{1.73_M2}
GLUCOSE BLDC GLUCOMTR-MCNC: 100 MG/DL (ref 70–99)
GLUCOSE BLDC GLUCOMTR-MCNC: 187 MG/DL (ref 70–99)
GLUCOSE BLDC GLUCOMTR-MCNC: 308 MG/DL (ref 70–99)
GLUCOSE SERPL-MCNC: 120 MG/DL (ref 70–99)
INR PPP: 2.17 (ref 0.86–1.14)
MAGNESIUM SERPL-MCNC: 2.6 MG/DL (ref 1.6–2.3)
PHOSPHATE SERPL-MCNC: 3.8 MG/DL (ref 2.5–4.5)
POTASSIUM SERPL-SCNC: 3.8 MMOL/L (ref 3.4–5.3)
SODIUM SERPL-SCNC: 135 MMOL/L (ref 133–144)

## 2021-01-11 PROCEDURE — 250N000013 HC RX MED GY IP 250 OP 250 PS 637: Performed by: STUDENT IN AN ORGANIZED HEALTH CARE EDUCATION/TRAINING PROGRAM

## 2021-01-11 PROCEDURE — 99239 HOSP IP/OBS DSCHRG MGMT >30: CPT | Performed by: INTERNAL MEDICINE

## 2021-01-11 PROCEDURE — 85610 PROTHROMBIN TIME: CPT | Performed by: INTERNAL MEDICINE

## 2021-01-11 PROCEDURE — 250N000011 HC RX IP 250 OP 636: Performed by: STUDENT IN AN ORGANIZED HEALTH CARE EDUCATION/TRAINING PROGRAM

## 2021-01-11 PROCEDURE — 250N000012 HC RX MED GY IP 250 OP 636 PS 637: Performed by: INTERNAL MEDICINE

## 2021-01-11 PROCEDURE — 250N000013 HC RX MED GY IP 250 OP 250 PS 637: Performed by: INTERNAL MEDICINE

## 2021-01-11 PROCEDURE — 36415 COLL VENOUS BLD VENIPUNCTURE: CPT | Performed by: INTERNAL MEDICINE

## 2021-01-11 PROCEDURE — 999N001017 HC STATISTIC GLUCOSE BY METER IP

## 2021-01-11 PROCEDURE — 80048 BASIC METABOLIC PNL TOTAL CA: CPT | Performed by: INTERNAL MEDICINE

## 2021-01-11 PROCEDURE — 83735 ASSAY OF MAGNESIUM: CPT | Performed by: INTERNAL MEDICINE

## 2021-01-11 PROCEDURE — 250N000012 HC RX MED GY IP 250 OP 636 PS 637: Performed by: STUDENT IN AN ORGANIZED HEALTH CARE EDUCATION/TRAINING PROGRAM

## 2021-01-11 PROCEDURE — 84100 ASSAY OF PHOSPHORUS: CPT | Performed by: INTERNAL MEDICINE

## 2021-01-11 PROCEDURE — 250N000009 HC RX 250

## 2021-01-11 PROCEDURE — 99233 SBSQ HOSP IP/OBS HIGH 50: CPT | Mod: GC | Performed by: INTERNAL MEDICINE

## 2021-01-11 RX ORDER — LIDOCAINE HYDROCHLORIDE AND EPINEPHRINE 10; 10 MG/ML; UG/ML
INJECTION, SOLUTION INFILTRATION; PERINEURAL
Status: DISCONTINUED
Start: 2021-01-11 | End: 2021-01-11 | Stop reason: HOSPADM

## 2021-01-11 RX ORDER — ISOSORBIDE DINITRATE 10 MG/1
10 TABLET ORAL 3 TIMES DAILY
Qty: 90 TABLET | Refills: 0 | Status: ON HOLD | OUTPATIENT
Start: 2021-01-11 | End: 2021-05-31

## 2021-01-11 RX ORDER — POTASSIUM CHLORIDE 750 MG/1
20 TABLET, EXTENDED RELEASE ORAL ONCE
Status: COMPLETED | OUTPATIENT
Start: 2021-01-11 | End: 2021-01-11

## 2021-01-11 RX ORDER — BUMETANIDE 2 MG/1
4 TABLET ORAL 2 TIMES DAILY
Qty: 540 TABLET | Refills: 3 | Status: SHIPPED | OUTPATIENT
Start: 2021-01-11 | End: 2021-02-02

## 2021-01-11 RX ORDER — HYDRALAZINE HYDROCHLORIDE 25 MG/1
75 TABLET, FILM COATED ORAL 3 TIMES DAILY
Qty: 150 TABLET | Refills: 0 | Status: ON HOLD | OUTPATIENT
Start: 2021-01-11 | End: 2021-05-31

## 2021-01-11 RX ORDER — POTASSIUM CHLORIDE 1500 MG/1
60 TABLET, EXTENDED RELEASE ORAL DAILY
Qty: 90 TABLET | Refills: 0 | Status: ON HOLD | OUTPATIENT
Start: 2021-01-11 | End: 2021-05-31

## 2021-01-11 RX ORDER — WARFARIN SODIUM 10 MG/1
10 TABLET ORAL
Status: COMPLETED | OUTPATIENT
Start: 2021-01-11 | End: 2021-01-11

## 2021-01-11 RX ORDER — LIDOCAINE HYDROCHLORIDE 10 MG/ML
INJECTION, SOLUTION EPIDURAL; INFILTRATION; INTRACAUDAL; PERINEURAL
Status: COMPLETED
Start: 2021-01-11 | End: 2021-01-11

## 2021-01-11 RX ORDER — WARFARIN SODIUM 2.5 MG/1
TABLET ORAL
Qty: 50 TABLET | Refills: 0 | Status: ON HOLD | OUTPATIENT
Start: 2021-01-11 | End: 2021-02-09

## 2021-01-11 RX ORDER — TRIAMCINOLONE ACETONIDE 40 MG/ML
40 INJECTION, SUSPENSION INTRA-ARTICULAR; INTRAMUSCULAR ONCE
Status: COMPLETED | OUTPATIENT
Start: 2021-01-11 | End: 2021-01-11

## 2021-01-11 RX ADMIN — PREDNISONE 5 MG: 5 TABLET ORAL at 08:02

## 2021-01-11 RX ADMIN — TRAMADOL HYDROCHLORIDE 50 MG: 50 TABLET, FILM COATED ORAL at 05:24

## 2021-01-11 RX ADMIN — ALBUTEROL SULFATE 2 PUFF: 90 AEROSOL, METERED RESPIRATORY (INHALATION) at 15:57

## 2021-01-11 RX ADMIN — LIDOCAINE HYDROCHLORIDE 20 MG: 10 INJECTION, SOLUTION EPIDURAL; INFILTRATION; INTRACAUDAL; PERINEURAL at 17:20

## 2021-01-11 RX ADMIN — INSULIN ASPART 4 UNITS: 100 INJECTION, SOLUTION INTRAVENOUS; SUBCUTANEOUS at 17:18

## 2021-01-11 RX ADMIN — GABAPENTIN 300 MG: 300 CAPSULE ORAL at 08:02

## 2021-01-11 RX ADMIN — PREDNISONE 30 MG: 5 TABLET ORAL at 08:01

## 2021-01-11 RX ADMIN — ALLOPURINOL 300 MG: 300 TABLET ORAL at 08:01

## 2021-01-11 RX ADMIN — INSULIN ASPART 1 UNITS: 100 INJECTION, SOLUTION INTRAVENOUS; SUBCUTANEOUS at 11:13

## 2021-01-11 RX ADMIN — GABAPENTIN 300 MG: 300 CAPSULE ORAL at 13:19

## 2021-01-11 RX ADMIN — ACETAMINOPHEN 650 MG: 325 TABLET, FILM COATED ORAL at 05:24

## 2021-01-11 RX ADMIN — PANTOPRAZOLE SODIUM 40 MG: 40 TABLET, DELAYED RELEASE ORAL at 08:02

## 2021-01-11 RX ADMIN — ZINC SULFATE 220 MG (50 MG) CAPSULE 220 MG: CAPSULE at 08:02

## 2021-01-11 RX ADMIN — ISOSORBIDE DINITRATE 10 MG: 5 TABLET ORAL at 08:00

## 2021-01-11 RX ADMIN — TRIAMCINOLONE ACETONIDE 40 MG: 40 INJECTION, SUSPENSION INTRA-ARTICULAR; INTRAMUSCULAR at 17:19

## 2021-01-11 RX ADMIN — BUMETANIDE 4 MG: 2 TABLET ORAL at 15:57

## 2021-01-11 RX ADMIN — UMECLIDINIUM 1 PUFF: 62.5 AEROSOL, POWDER ORAL at 08:02

## 2021-01-11 RX ADMIN — HYDRALAZINE HYDROCHLORIDE 75 MG: 25 TABLET ORAL at 08:01

## 2021-01-11 RX ADMIN — TRAMADOL HYDROCHLORIDE 50 MG: 50 TABLET, FILM COATED ORAL at 16:05

## 2021-01-11 RX ADMIN — WARFARIN SODIUM 10 MG: 10 TABLET ORAL at 17:18

## 2021-01-11 RX ADMIN — ISOSORBIDE DINITRATE 10 MG: 5 TABLET ORAL at 13:19

## 2021-01-11 RX ADMIN — ASPIRIN 81 MG CHEWABLE TABLET 81 MG: 81 TABLET CHEWABLE at 08:00

## 2021-01-11 RX ADMIN — HYDRALAZINE HYDROCHLORIDE 75 MG: 25 TABLET ORAL at 13:19

## 2021-01-11 RX ADMIN — BUMETANIDE 4 MG: 2 TABLET ORAL at 08:00

## 2021-01-11 RX ADMIN — ACETAMINOPHEN 650 MG: 325 TABLET, FILM COATED ORAL at 16:05

## 2021-01-11 RX ADMIN — AMIODARONE HYDROCHLORIDE 400 MG: 200 TABLET ORAL at 08:00

## 2021-01-11 RX ADMIN — POTASSIUM CHLORIDE 40 MEQ: 750 TABLET, EXTENDED RELEASE ORAL at 08:01

## 2021-01-11 RX ADMIN — POTASSIUM CHLORIDE 20 MEQ: 750 TABLET, EXTENDED RELEASE ORAL at 08:00

## 2021-01-11 RX ADMIN — BUPROPION HYDROCHLORIDE 150 MG: 150 TABLET, EXTENDED RELEASE ORAL at 08:00

## 2021-01-11 RX ADMIN — FLUTICASONE FUROATE AND VILANTEROL TRIFENATATE 1 PUFF: 200; 25 POWDER RESPIRATORY (INHALATION) at 08:02

## 2021-01-11 ASSESSMENT — ACTIVITIES OF DAILY LIVING (ADL)
ADLS_ACUITY_SCORE: 12

## 2021-01-11 ASSESSMENT — MIFFLIN-ST. JEOR: SCORE: 1720.24

## 2021-01-11 NOTE — CONSULTS
Cardiology/Electrophysiology Consult                                                               2021  Jim Willingham MRN: [id number]  Age: 65 year old, : 1955        Reason for consult:      Amiodarone duration for Atrial Fibrillation        Assessment and Recommendation:     64 yo with past medical history significant for NICM (EF 20%) s/p HM II LVAD placement (17) at [facility] (obesity), VT s/p metronic dual chamber ICD 3/6/2008, atrial fibrillation s/p DCCV 2011, 20 now on amiodarone, warfarin, CKD stage III, DM type 2, RV failure, CECILIA, obesity, hypertension, COPD, asthma, here with acute on chronic systolic heart failure. EP consulted for guidance with amiodarone dose and duration.    # VT s/p appropriate ICD fire 20  # CRT-D in place  # Heart Failure exacerbation  Patient had appropriate ICD fire 20 for which he was started on amiodarone 400mg. He has continued on this dose over this time. He had another appropriate ICD fire 20 for a VT/VF episode of >300 bpm with 3 other episodes that fell within the monitoring zone that lasted up to 7.5 min. This is in the setting of likely heart failure exacerbation, for which he has been diuresed and appear to have achieved euvolemia. Given his recent ICD fire and sub-threshold sustained VT, continuing amiodarone seems appropriate at this time. I do not have an expected duration until there is evidence that he can remain VT free. Surveillance labs below, but basically he is past-due for PFTs. We will have to monitor this closely given his severe COPD. Unfortunately, the most recent PFTs was performed in 2017 and we don't have a more recent baseline prior to restarting amiodarone.  - Continue amiodarone 400mg daily  - Monitoring labs:  -- TSH 20 3.45 (normal), repeat in 6 months  -- LFTs 20 normal, repeat in 6 months  -- PFTs past due: 9/15/17, mild reduction in FEV1 from 20. Impression Very severe  obstruction with normal DLCO. Please schedule on non-urgent basis    ICD settings: Medtronic CRT-D. Coronary sinus lead is turned off. Current setting: AAIR @ 50bpm. VF only therapy. ATP before charging. VF zone > 222bpm. 13 months to MARVA  - Patient scheduled for remote transmission in 3 months, RTC in 6 months  - Patient is nearing MARVA, but will continue to monitor until he reaches MARVA, at which point will schedule generator change    # Paroxysmal Atrial Fibrillation  Jim has a history of paroxysmal/peristent atrial fibrillation with 2 prior DCCVs in 2011 and more recently 5/11/20 with successful cardioversion to NSR. He was treated with amiodarone in 2011 but had been previously on metoprolol until his ICD shock in 06/2020 and has remained on Amiodarone 400mg. ICD interrogation does not show AF/AFl episodes since his cardioversion and symptomatically he reports doing much better since cardioversion. He remains on warfarin for his HMII LVAD, but with a [id number] of 3, also has indication for anticoagulation.   - continue amiodarone, as above        Patient discussed with staff attending, Dr. Darcie Gonzales and the note reflects our joint plan. Thank you for consulting the EP cardiovascular services at the [facility]. Please do not hesitate to call with questions or concerns.     Steve Dewey MD    PGY-6, Cardiology Fellow  Pager: [phone]        History of Present Illness:     Jim Willingham is a 66 yo with past medical history significant for NICM (EF 20%) s/p HM II LVAD placement (6/19/17) at  (obesity), VT s/p metronic dual chamber ICD 3/6/2008, atrial fibrillation s/p DCCV 1/14/2011, 5/11/20 now on amiodarone, warfarin, CKD stage III, DM type 2 c/b neuropathy, RV failure, CECILIA, obesity, hypertension, COPD, asthma who presented with acute on chronic systolic heart failure.    Jim had previously been treated for atrial fibrillation while at [facility]. He was treated with  amiodarone and successful DCCV on 1/14/2011. Since this time he has had paroxysmal atrial fibrillation. In mid-January 2020 he was admitted to the hospital with influenza and found to have AFib with RVR, which had remained persistent throughout the Spring. He saw Dr. Darcie Gonzales in clinic on 3/5/20, where the decision was made to arrange for DCCV attempt. He was successfully cardioverted on 5/11/20. On 6/11/20 he called the VAD coordinator to report single ICD fire while he was sitting quietly on the couch. ICD interrogation revealed appropriate shock for VT run. Documentation is a little unclear, but it appears he was started on amiodarone 400mg in mid-late June and has remained on this since.    Jim endorsed several months of volume management difficulty. He was admitted 9/19-9/27 with Staph hominis bacteremia and was treated with 6 weeks of IV vancomycin through 10/30. Since then he had been having more fatigue and weakness. However, for the past several months he had been having labile volume status, requiring several adjustments to diuretic regimen. He had another appropriate 35J ICD shock 12/11/20 for a VT/VF of >300 bpm. He had 3 other VT episodes lasting up to 7.5 minutes that were only monitored. Right heart catheterization on 1/7/21 showed moderately elevated biventricular pressures and he was admitted for further optimization.     He was diuresed, initially in the unit with a [place] still in place, however has since achieved euvolemia and was moved to the floor and transitioned to PO Bumex. His LVAD speed was also increased from 9400 to 9600. His hospital stay has also been complicated by gout flare, for which he has been treated with prednisone. Review of tele shows sinus rhythm with occasional PVCs and couplets but no atrial fibrillation or VT.    A 13-point ROS is negative except as mentioned above      Past Medical History:     Patient Active Problem List   Diagnosis     Paroxysmal atrial fibrillation  (H)     Type 2 diabetes mellitus with complication, with long-term current use of insulin (H)     Stage 3b chronic kidney disease     H/O gastric bypass     CECILIA (obstructive sleep apnea)     Vitamin B12 deficiency (non anemic)     Paroxysmal VT (H)     ICD (implantable cardioverter-defibrillator), Medtronic Biventricular ICD - Not Dependent     NICM (nonischemic cardiomyopathy) (H)/ EF 20%     Heart failure (H)     LVAD (left ventricular assist device) present (H)     Long-term (current) use of anticoagulants [Z79.01]     Physical deconditioning     Chronic systolic congestive heart failure (H)     Iron deficiency anemia     Influenza     Dyspnea     Acute-on-chronic kidney injury (H)     Shortness of breath     WALTER (acute kidney injury) (H)     Bacteremia     Class 2 severe obesity due to excess calories with serious comorbidity and body mass index (BMI) of 35.0 to 35.9 in adult (H)     Bilateral carpal tunnel syndrome     Rotator cuff tear arthropathy of both shoulders     COPD (chronic obstructive pulmonary disease) (H)     Major depression, recurrent, chronic (H)     Gouty arthropathy, chronic, without tophi         Past Surgical History:      Past Surgical History:   Procedure Laterality Date     ANESTHESIA CARDIOVERSION N/A 5/11/2020    Procedure: ANESTHESIA, FOR CARDIOVERSION @1100;  Surgeon: GENERIC ANESTHESIA PROVIDER;  Location: [facility] OR     CV RIGHT HEART CATH N/A 7/24/2019    Procedure: CV RIGHT HEART CATH;  Surgeon: Renu Sears MD;  Location: [facility] HEART CARDIAC CATH LAB     CV RIGHT HEART CATH N/A 8/5/2020    Procedure: CV RIGHT HEART CATH;  Surgeon: Nicola Seth MD;  Location: [facility] HEART CARDIAC CATH LAB     CV RIGHT HEART CATH N/A 1/7/2021    Procedure: CV RIGHT HEART CATH;  Surgeon: Jac Dover MD;  Location:  HEART CARDIAC CATH LAB     GI SURGERY  2003    [place] en [facility]     INSERT VENTRICULAR ASSIST DEVICE LEFT (HEARTMATE II) N/A 6/19/2017    Procedure: INSERT VENTRICULAR ASSIST DEVICE LEFT  (HEARTMATE II);  Median Sternotomy Heartmate II Left Ventricular Assist Device Insertion on Pump Oxygenator;  Surgeon: Ronnie Quigley MD;  Location: [facility] OR     ORTHOPEDIC SURGERY      right knee wired     PICC DOUBLE LUMEN PLACEMENT Right 2020    5FR PICC DL. Length-43cm (1cm out).         Social History:     Social History     Socioeconomic History     Marital status:      Spouse name: Not on file     Number of children: Not on file     Years of education: Not on file     Highest education level: Not on file   Occupational History     Not on file   Social Needs     Financial resource strain: Not on file     Food insecurity     Worry: Not on file     Inability: Not on file     Transportation needs     Medical: Not on file     Non-medical: Not on file   Tobacco Use     Smoking status: Former Smoker     Quit date:      Years since quittin.0     Smokeless tobacco: Never Used   Substance and Sexual Activity     Alcohol use: No     Drug use: No     Sexual activity: Yes     Partners: Female   Lifestyle     Physical activity     Days per week: Not on file     Minutes per session: Not on file     Stress: Not on file   Relationships     Social connections     Talks on phone: Not on file     Gets together: Not on file     Attends Anabaptism service: Not on file     Active member of club or organization: Not on file     Attends meetings of clubs or organizations: Not on file     Relationship status: Not on file     Intimate partner violence     Fear of current or ex partner: Not on file     Emotionally abused: Not on file     Physically abused: Not on file     Forced sexual activity: Not on file   Other Topics Concern     Parent/sibling w/ CABG, MI or angioplasty before 65F 55M? No   Social History Narrative     Not on file         Family History:     Family History   Problem Relation Age of Onset     Cerebrovascular Disease Mother 64     Diabetes Mother      Hypertension Mother      Coronary Artery  Disease Father      Diabetes Type 2  Father      Obesity Brother      Obesity Brother      Cerebrovascular Disease Daughter 40         Allergies:     Allergies   Allergen Reactions     Beer Shortness Of Breath     Grass Shortness Of Breath     Ace Inhibitors Cough     Dust Mites Other (See Comments)     Asthma     Mold Other (See Comments)     Asthma     Penicillins Other (See Comments)     Unknown - childhood exposure    Tolerated Zosyn 9/18-9/20/2020     Sulfa Drugs Other (See Comments) and Unknown     Unknown childhood reaction         Medications:     No current facility-administered medications on file prior to encounter.        acetaminophen (TYLENOL) 325 MG tablet, Take 650 mg by mouth every 6 hours as needed for mild pain       albuterol (PROAIR HFA) 108 (90 Base) MCG/ACT inhaler, Inhale 2 puffs into the lungs every 4 hours as needed for shortness of breath / dyspnea or wheezing        allopurinol (ZYLOPRIM) 300 MG tablet, Take 1 tablet (300 mg) by mouth daily       amiodarone (PACERONE) 200 MG tablet, Take 400 mg by mouth daily       aspirin 81 MG chewable tablet, 1 tablet (81 mg) by Oral or Feeding Tube route daily       buPROPion (WELLBUTRIN XL) 150 MG 24 hr tablet, Take 1 tablet (150 mg) by mouth every morning       fluticasone-vilanterol (BREO ELLIPTA) 200-25 MCG/INH oral inhaler, Inhale 1 puff into the lungs daily       gabapentin (NEURONTIN) 300 MG capsule, 300mg twice daily + 600mg at bedtime       hydrALAZINE (APRESOLINE) 50 MG tablet, Take 1 tablet (50 mg) by mouth 3 times daily       insulin glargine (LANTUS SOLOSTAR) 100 UNIT/ML pen, Inject 6 Units Subcutaneous At Bedtime       insulin lispro (HUMALOG KWIKPEN) 100 UNIT/ML (1 unit dial) KWIKPEN, Inject 1-7 Units Subcutaneous 4 times daily       montelukast (SINGULAIR) 10 MG tablet, Take 10 mg by mouth At Bedtime        pantoprazole (PROTONIX) 40 MG EC tablet, Take 1 tablet (40 mg) by mouth every morning       predniSONE (DELTASONE) 5 MG tablet,  Take 1 tablet (5 mg) by mouth daily       traMADol (ULTRAM) 50 MG tablet, Take 2 tablets (100 mg) by mouth every 6 hours as needed for moderate pain or breakthrough pain       umeclidinium (INCRUSE ELLIPTA) 62.5 MCG/INH oral inhaler, Inhale 1 puff into the lungs daily       warfarin ANTICOAGULANT (COUMADIN) 5 MG tablet, Take 5 mg daily or as directed by the anticoagulation clinic       zinc sulfate (ZINCATE) 220 (50 Zn) MG capsule, Take 220 mg by mouth 2 times daily       blood glucose monitoring (ONE TOUCH ULTRA 2) meter device kit, Use to test blood sugar four times daily or as directed.       blood glucose VI test strip, Use to test blood sugar four times daily or as directed.       cyanocobalamin (VITAMIN B12) 1000 MCG/ML injection, Inject 1,000 mcg into the muscle every 30 days       insulin pen needle (32G X 4 MM) 32G X 4 MM miscellaneous, Use four pen needles daily or as directed.       metolazone (ZAROXOLYN) 2.5 MG tablet, Take 1 tablet (2.5 mg) by mouth as needed (take ONLY as directed by VAD team)            Physical Exam:     B/P: 90/85, T: 98.1, P: 65, R: 17    Wt Readings from Last 4 Encounters:   01/11/21 95.1 kg (209 lb 9.6 oz)   12/18/20 110.2 kg (243 lb)   11/30/20 111.3 kg (245 lb 6.4 oz)   11/27/20 111.4 kg (245 lb 8 oz)         Intake/Output Summary (Last 24 hours) at 1/11/2021 1014  Last data filed at 1/11/2021 0759  Gross per 24 hour   Intake 1840 ml   Output 5450 ml   Net -3610 ml       Gen: AA&Ox3, no acute distress  HEENT:AT/ NC, PERRL b/l, EOM grossly intact, mucous membranes pink, moist without plaque or exudate  PULM/THORAX: Clear to auscultation bilaterally, no rales/rhonchi/wheezes  CV:RRR, S1 and S2 appreciated, no extra heart sounds, murmurs or rub auscultated. No JVD  ABD: obese, soft, nontender, nondistended. Normoactive bowel sounds  EXT: No edema, clubbing or cyanosis.  NEURO: CN II-XII intact      Data:     Labs Reviewed on Admission    Troponin   Lab Results   Component Value  Date    TROPI 0.018 2020    TROPI 0.025 2020    TROPI 0.044 2020    TROPI 0.017 2019     BMP  Recent Labs   Lab 21  0536 01/10/21  1643 01/10/21  1224 01/10/21  0725    133 134 135   POTASSIUM 3.8 4.6 4.4 3.7   CHLORIDE 99 98 99 100   QAMAR 8.6 8.5 8.3* 8.5   CO2 28 28 28 29   BUN 50* 49* 44* 44*   CR 1.69* 1.83* 1.71* 1.69*   * 210* 168* 113*     CBC  Recent Labs   Lab 21  0538 21  0410 21  0903 21  0901 21  0719   WBC 5.8 5.7  --   --  7.5   RBC 3.87* 4.21*  --   --  3.98*   HGB 9.7* 10.4* 9.8* 9.8* 10.2*   HCT 32.5* 36.3*  --   --  33.7*   MCV 84 86  --   --  85   MCH 25.1* 24.7*  --   --  25.6*   MCHC 29.8* 28.7*  --   --  30.3*   RDW 18.8* 19.2*  --   --  18.7*    232  --   --  219     INR  Recent Labs   Lab 21  0536 01/10/21  0438 21  0538 21  0410   INR 2.17* 2.36* 2.64* 2.88*      Hepatic Panel   Lab Results   Component Value Date    AST 38 2021     Lab Results   Component Value Date    ALT 55 2021     No results found for: BILICONJ   Lab Results   Component Value Date    BILITOTAL 0.6 2021     Lab Results   Component Value Date    ALBUMIN 4.1 2021     Lab Results   Component Value Date    PROTTOTAL 7.1 2021      Lab Results   Component Value Date    ALKPHOS 121 2021           Most Recent Imaging:     EK20  Sinus rhythm, rate 72. Occasional PVCs. Mild right axis deviation. Poor R wave progression.      Device Interrogation: 20  Narrative & Impression    Patient seen in [facility] for evaluation and iterative programming of a Medtronic Dual lead ICD per MD orders. Patient has an appointment to see Dr. Montgomery today. Normal ICD function. No new episodes recorded since patients most recent shock on 20. Intrinsic rhythm = SR (with a 1st degree AVB) @ 68 bpm. AP = 0.3%.  = 0%.OptiVol fluid index is < 200. Patient reports he is retaining fluid, mainly in his feet and  lower legs. Estimated battery longevity to MARVA = 8 months. No short v-v intervals recorded. Lead trends appear stable. Patient reports that he is feeling well and denies complaints. Plan for patient to send a remote transmission in 3 months and return to clinic in 6 months. EDUARDO Cain RN, BSN.      Dual lead ICD         Echo: 1/7/21  Interpretation Summary  HM2 at 9400RPM.  Severely (EF <30%) reduced left ventricular function is present. Moderate left  ventricular dilation is present. LVIDd 68mm. Septum midline.  RV is not well visulized. At least mildly reduced.  Aortic valve remain closed with no AI. Normal inflow velocities (77 cm/s).  Outflow velocity cannot be assessed.  Mild to moderate eccentric mitral insufficiency is present.  Dilation of the inferior vena cava is present with abnormal respiratory  variation in diameter.  IVC diameter >2.1 cm collapsing <50% with sniff suggests a high RA pressure  estimated at 15 mmHg or greater.     This study was compared with the study from 9/19/2020 .LVIDd (previous study  63 mm) enlarged further. Otherwise no significant change.    Cath: 1/7/21  Conclusion         Right sided filling pressures are moderately elevated.    Moderately elevated pulmonary artery hypertension.    Left sided filling pressures are moderately elevated.    Reduced cardiac output level.     PFT 9/15/17      Narrative    The FVC, FEV1 and FEV1/FVC ratio are reduced.   The inspiratory flow rates are within normal limits.  Following administration of bronchodilators, there is no significant response.  The diffusing capacity is normal.   Compared to 6/8, FEV1 is reduced by 20 mL.   IMPRESSION:   Very severe obstruction with normal DLCO.

## 2021-01-11 NOTE — PROGRESS NOTES
D:pt up independent, steady gait. Pt states he feels good and feels good about new dry weight.  A:LS clear, VSS  P:Pt to discharge in the am per Primary

## 2021-01-11 NOTE — PLAN OF CARE
D: Pt admit 1/7/21 for acute on chronic [facility] admit for RHC and HF treatment. PMH NICM (EF 20%) s/p HM2 LVAD (6/19/17), metronic dual chamber ICD, Afib, CKD3, DM2, RV failure CECILIA ,obesity, HTN, COPD, asthma     I/A:   Neuro: A&Ox4.   VS: VSS. RA, pt refused CPAP   LVAD #'s WNL, no alarms this shift. Pt has weekly dressing changes next due 1/14/21  Tele: paced  Pain: Pt endorsed gout pain in L knee managed with PRN tylenol x1, PRN tramadol x1, and aqua K heating pad secured to pt knee with ACE bandage  GI/: Urinating adequately. No BM this shift.  Diet: 2g Na and Moderate consistent CHO diet. 1.8L FR.  BG/insulin: ACHS BG checks WNL   IV/Drips: L PIV SL  Activity: up ad francisco  Skin/drains: Pt skin intact. Pt has asymptomatic generalized rash on shoulders pt states this as baseline x3+ months.      P: Plan for EP and Rheum consults followed by possible discharge today. Continue to monitor pt status and report changes to Cards 2.      Erna Rodriguez RN

## 2021-01-11 NOTE — DISCHARGE SUMMARY
[facility]     Discharge Summary  Cardiology 2 Service    Date of Admission:  1/7/2021  Date of Discharge:  1/11/2021  Discharging Provider: Rose Conte MD    Discharge Diagnoses   Acute on chronic systolic heart failure with volume overload  Nonischemic cardiomyopathy status-post LVAD  Atrial fibrillation  CKD stage III  Diabetes mellitus type II  Obstructive sleep apnea  Obesity  Hypertension  COPD  Asthma    Follow-ups Needed After Discharge   INR, BMP, and Mg check on 1/15/21  Follow up with Dr. Montgomery with repeat labs and PFTs on 1/21/21  Follow up with Rheum clinic within 2-4 weeks    Discharge Disposition   Discharged to home  Condition at discharge: Stable    Hospital Course   Jim Willingham was admitted on 1/7/2021.  The following problems were addressed during his hospitalization:    # NICM (EF 20%) s/p HM II LVAD (6/19/17) at  (obesity, now BMI 32)  # Volume Overloaded  On initial presentation his MAP~ and CVP 18. RHC with mRA 17, mPA30, PCWP 25, MICHELLE CI  2.5. Responded well to diuresis with Bumex drip and increase of LVAD speed to 9600 1/8. Maintained appropriate response after switch to oral Bumex 4 mg PO BID on 1/9. Feels much improved after diuresis and speed adjustment. Currently thought to be euvolemic.   - Speed 9600 rpm  - Continue Bumex 4 mg PO BID + potassium 60 mEq PO daily   - Continue heart failure management:                Afterload reduction:                           Hydralazine 75 TID (PTA hydralazine 50 mg TID )                          Isosorbide dinitrate 10 mg TID (new medication)                           MAP goal: at goal of 65-80              Antiplatelet: ASA, Anticoagulation: Warfarin, INR goal 2-3, will continue 7.5 mg all days  except Tuesday (then 10 mg on that day) with repeat INR on 1/15              SCD: Metronic dual chamber ICD  - Follow up: repeat BMP/Mg/INR on 1/15 and then follow-up with Dr. Montgomery on 1/21  with labs and repeat PFTs at that time (patient will need PFTs repeated prior to consideration of transplant listing). May need decreased diuretics at home                # WALTER on CKD, resolved  Has been trending up and down in the past 6 months. Cardiorenal vs prerenal from diuresis. Current creatinine is at baseline.  - Repeat BMP on 1/15     # Gouty arthritis flare  Acute worsening left knee pain and tenderness 1/9. Similar to gouty attack per patient. Has had 2 attacks in the past 4 months. Has improved with prednisone 30 mg daily given first dose on 1/9. Rheumatology consult placed during inpatient for management plan and alternative prophylactic medications.  - s/p steroid joint injection by Rheum this admission  - Will continue outpatient Rheum follow-up     # Left arm pain  Has been there for a week+. Pain on flexion and extension of his left elbow. No fever. Most likely muscle spasm vs tendinitis in etiology.   - Patient to elevate, warm compresses, and APAP PRN for pain     # H/o atrial fibrillation on warfarin  # H/o VT/VF on amiodarone  - Continue PTA warfarin regimen of 7.5 mg daily except Tues with 10 mg that day, INR goal 2-3, next check on 1/15  - EP consulted while inpatient, recommend 400 mg daily indefinitely for now with ongoing monitoring with TFTs, PFTs, and LFTs Q6 months and continue previously scheduled EP f/u in 6 months     Chronic Medical Conditions  DM type 2: continue PTA regimen  Hypertension: Hydralazine and isordil as above  COPD/Asthma: Scheduled Breo Ellipta, continue albuterol PRN  CECILIA: Continue PTA CPAP QHS  Psychiatric condition: Continue PTA bupropion    Consultations This Hospital Stay   PHARMACY TO DOSE WARFARIN  PHARMACY TO DOSE WARFARIN  VASCULAR ACCESS CARE ADULT IP CONSULT  PHYSICAL THERAPY ADULT IP CONSULT  CARDIOLOGY ELECTROPHYSIOLOGY (EP) IP CONSULT  RHEUMATOLOGY IP CONSULT  CARE MANAGEMENT / SOCIAL WORK IP CONSULT    Code Status   Full Code    Time Spent on this  Encounter   I personally saw the patient today and spent greater than 30 minutes discharging this patient.     Patient was seen and discussed with staff attending, Dr. Conte, who agrees with the above.    Xiomara Carrera MD  Cardiology Fellow  [facility]       I,Rose Conte MD, have seen and examined this patient. I have discussed with the team and agree with the findings and discharge plan. I have reviewed the hospital course with the patient and have spent 35 minutes in the process of discharge, including discharge planning with patient education, medication teaching, and the coordination of care to outpatient providers.  It has been a pleasure to participate in the care of your patient - please do not hesitate to contact me with any questions.     Rose Conte MD  Section Head - Advanced Heart Failure, Transplantation and Mechanical Circulatory Support  Director - [facility]  Associate Professor of Medicine, [facility]  ______________________________________________________________________    Physical Exam   Temp: 98.5  F (36.9  C) Temp src: Oral BP: 95/64 Pulse: 76   Resp: 18 SpO2: 97 % O2 Device: None (Room air)    Vitals:    01/09/21 0034 01/10/21 0627 01/11/21 0609   Weight: 98.3 kg (216 lb 12.8 oz) 97.4 kg (214 lb 11.2 oz) 95.1 kg (209 lb 9.6 oz)     Vital Signs with Ranges  Temp:  [98  F (36.7  C)-98.5  F (36.9  C)] 98.5  F (36.9  C)  Pulse:  [62-77] 76  Resp:  [17-18] 18  BP: (90-98)/(64-85) 95/64  SpO2:  [97 %-98 %] 97 %  I/O last 3 completed shifts:  In: 1840 [P.O.:1840]  Out: 5025 [Urine:5025]  GA: alert, interactive, NAD  HEENT: EOMI, MMM, JVP ~7 cm  Lungs: clear on auscultation both lungs  CVS: LVAD hum  Abd: normal bowel sounds, soft, nontender  Ext: left upper arm tenderness along left bicep muscle (no limit range of motion at shoulder / elbow) Overlying erythema, no superficial  tenderness  Neuro: A&O x4, moving independently, otherwise grossly intact     Primary Care Physician   Ricadro Gómez    Discharge Orders       Significant Results and Procedures   Most Recent 3 CBC's:  Recent Labs   Lab Test 01/09/21  0538 01/08/21  0410 01/07/21  0903 01/07/21  0719 01/07/21  0719   WBC 5.8 5.7  --   --  7.5   HGB 9.7* 10.4* 9.8*   < > 10.2*   MCV 84 86  --   --  85    232  --   --  219    < > = values in this interval not displayed.     Most Recent 3 BMP's:  Recent Labs   Lab Test 01/11/21  0536 01/10/21  1643 01/10/21  1224    133 134   POTASSIUM 3.8 4.6 4.4   CHLORIDE 99 98 99   CO2 28 28 28   BUN 50* 49* 44*   CR 1.69* 1.83* 1.71*   ANIONGAP 8 7 7   QAMAR 8.6 8.5 8.3*   * 210* 168*     Most Recent 3 Troponin's:  Recent Labs   Lab Test 08/05/20  0335 01/21/20  0011 01/20/20  1759   TROPI 0.018 0.025 0.044     Most Recent 3 BNP's:  Recent Labs   Lab Test 08/05/20  0335 07/31/19  1050 07/03/19  0828 07/06/18  0856 06/15/17  1155 06/15/17  1155 05/23/17  1157   NTBNPI 1,162*  --   --   --   --  4,216* 1,832*   NTBNP  --  866* 1,109* 1,351*   < >  --   --     < > = values in this interval not displayed.   ,   Results for orders placed or performed during the hospital encounter of 01/07/21   XR Chest Port 1 View    Narrative    EXAM: XR CHEST 1 VW 1/7/2021      HISTORY: retained swan.    COMPARISON: Radiograph 9/23/2020.     TECHNIQUE: Frontal view of the chest.    FINDINGS: Right internal jugular central [place]-Aashish catheter tip  projects over the proximal right main pulmonary artery. Stable 3-lead  implantable cardiac defibrillator in the left chest wall. Epicardial  pacing wires. Discontinuous superior most sternotomy wire. Hazy  interstitial pulmonary opacities, left greater than right. Streaky  basilar opacities. No focal airspace disease. No definite large  effusion or pneumothorax. Heart is enlarged with partially visualized  LVAD in place.      Impression    IMPRESSION: Right  jugular [place]-Aashish catheter tip urinary proximal  right main pulmonary artery. Otherwise unchanged chest with LVAD,  implantable cardiac defibrillator, cardiomegaly, mild pulmonary edema.    I have personally reviewed the examination and initial interpretation  and I agree with the findings.    MELY KELLY MD   XR Chest Port 1 View    Narrative    EXAM: XR CHEST PORT 1 VW  2021 1:05 AM     HISTORY:  retained swan       COMPARISON:  2021    TECHNIQUE: Single frontal radiograph of the chest    FINDINGS/    Impression    IMPRESSION: Stable postsurgical changes, support devices including  [place]-Aashish catheter. No acute consolidation.     I have personally reviewed the examination and initial interpretation  and I agree with the findings.    JEROME CUMMINGS MD   Echo Complete    Narrative    [id number]  [id number]  [id number]  [id number]^[id number]                                                                          Version 2        [facility],[place]  Echocardiography Laboratory  [address] [place] [id number]     Name: LOIS FULLER  MRN: [id number]  : 1955  Study Date: 2021 08:57 AM  Age: 65 yrs  Gender: Male  Patient Location: [facility]  Reason For Study: CHF  Ordering Physician: NANDO GIRON  Referring Physician: CATALINA ODELL  Performed By: [facility] Rae Miller     BSA: 2.2 m2  Height: 68 in  Weight: 228 lb  BP: 82/74 mmHg  _____________________________________________________________________________  __        Procedure  LVAD Echocardiogram with two-dimensional, color and spectral Doppler  performed.  _____________________________________________________________________________  __        Interpretation Summary  HM2 at 9400RPM.  Severely (EF <30%) reduced left ventricular function is present. Moderate left  ventricular dilation is present. LVIDd 68mm. Septum midline.  RV is not well visulized. At least mildly reduced.  Aortic valve remain closed with  no AI. Normal inflow velocities (77 cm/s).  Outflow velocity cannot be assessed.  Mild to moderate eccentric mitral insufficiency is present.  Dilation of the inferior vena cava is present with abnormal respiratory  variation in diameter.  IVC diameter >2.1 cm collapsing <50% with sniff suggests a high RA pressure  estimated at 15 mmHg or greater.     This study was compared with the study from 9/19/2020 .LVIDd (previous study  63 mm) enlarged further. Otherwise no significant change.  _____________________________________________________________________________  __        Left Ventricle  LV eccentric hypertrophy. Moderate left ventricular dilation is present.  Severely (EF <30%) reduced left ventricular function is present. Diastolic  function not assessed due to presence of LVAD. Severe diffuse hypokinesis is  present. LVAD cannula was seen in the expected anatomic position in the LV  apex.     Right Ventricle  RV is not well visulized. At least mildly reduced. A pacemaker lead is noted  in the right ventricle.     Mitral Valve  Mild to moderate mitral insufficiency is present.     Aortic Valve  No aortic regurgitation is present.        Tricuspid Valve  Mild tricuspid insufficiency is present. The right ventricular systolic  pressure is approximated at 20.3 mmHg plus the right atrial pressure.     Pulmonic Valve  The pulmonic valve cannot be assessed.     Vessels  The aorta root is normal. Dilation of the inferior vena cava is present with  abnormal respiratory variation in diameter. IVC diameter >2.1 cm collapsing  <50% with sniff suggests a high RA pressure estimated at 15 mmHg or greater.     Pericardium  No pericardial effusion is present.     Compared to Previous Study  This study was compared with the study from 9/19/2020 . LVEDD enlarged  further. Otherwise no significant change.     _____________________________________________________________________________  __  MMode/2D Measurements &  Calculations  IVSd: 0.78 cm  LVIDd: 6.8 cm  LVIDs: 6.3 cm  LVPWd: 1.0 cm  FS: 7.1 %     LV mass(C)d: 274.7 grams  LV mass(C)dI: 127.1 grams/m2  RWT: 0.31        Doppler Measurements & Calculations  TR max sloane: 225.0 cm/sec  TR max P.3 mmHg     _____________________________________________________________________________  __           Report approved by: Kailee LOERA 2021 10:30 AM      Cardiac Catheterization    Narrative      Right sided filling pressures are moderately elevated.    Moderately elevated pulmonary artery hypertension.    Left sided filling pressures are moderately elevated.    Reduced cardiac output level.          *Note: Due to a large number of results and/or encounters for the requested time period, some results have not been displayed. A complete set of results can be found in Results Review.       Discharge Medications   Current Discharge Medication List      START taking these medications    Details   isosorbide dinitrate (ISORDIL) 10 MG tablet Take 1 tablet (10 mg) by mouth 3 times daily  Qty: 90 tablet, Refills: 0    Associated Diagnoses: Chronic systolic congestive heart failure (H); LVAD (left ventricular assist device) present (H)         CONTINUE these medications which have CHANGED    Details   bumetanide (BUMEX) 2 MG tablet Take 2 tablets (4 mg) by mouth 2 times daily  Qty: 540 tablet, Refills: 3    Associated Diagnoses: Chronic systolic congestive heart failure (H)      hydrALAZINE (APRESOLINE) 25 MG tablet Take 3 tablets (75 mg) by mouth 3 times daily  Qty: 150 tablet, Refills: 0    Associated Diagnoses: Chronic systolic congestive heart failure (H); LVAD (left ventricular assist device) present (H)      potassium chloride ER (KLOR-CON M) 20 MEQ CR tablet Take 3 tablets (60 mEq) by mouth daily  Qty: 90 tablet, Refills: 0    Associated Diagnoses: Chronic systolic congestive heart failure (H)      warfarin ANTICOAGULANT (COUMADIN) 2.5 MG tablet Take 7.5 mg daily except  Tuesdays then take 10 mg on that day or as directed by the anticoagulation clinic  Qty: 50 tablet, Refills: 0    Associated Diagnoses: LVAD (left ventricular assist device) present (H)         CONTINUE these medications which have NOT CHANGED    Details   acetaminophen (TYLENOL) 325 MG tablet Take 650 mg by mouth every 6 hours as needed for mild pain      albuterol (PROAIR HFA) 108 (90 Base) MCG/ACT inhaler Inhale 2 puffs into the lungs every 4 hours as needed for shortness of breath / dyspnea or wheezing       allopurinol (ZYLOPRIM) 300 MG tablet Take 1 tablet (300 mg) by mouth daily  Qty: 90 tablet, Refills: 1    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, unspecified laterality      amiodarone (PACERONE) 200 MG tablet Take 400 mg by mouth daily      aspirin 81 MG chewable tablet 1 tablet (81 mg) by Oral or Feeding Tube route daily  Qty: 36 tablet    Associated Diagnoses: LVAD (left ventricular assist device) present (H)      buPROPion (WELLBUTRIN XL) 150 MG 24 hr tablet Take 1 tablet (150 mg) by mouth every morning  Qty: 60 tablet, Refills: 1      fluticasone-vilanterol (BREO ELLIPTA) 200-25 MCG/INH oral inhaler Inhale 1 puff into the lungs daily      gabapentin (NEURONTIN) 300 MG capsule 300mg twice daily + 600mg at bedtime      insulin glargine (LANTUS SOLOSTAR) 100 UNIT/ML pen Inject 6 Units Subcutaneous At Bedtime  Qty: 9 mL, Refills: 3    Comments: If Lantus is not covered by insurance, may substitute Basaglar at same dose and frequency.    Associated Diagnoses: Type 2 diabetes mellitus with hyperglycemia, with long-term current use of insulin (H)      insulin lispro (HUMALOG KWIKPEN) 100 UNIT/ML (1 unit dial) KWIKPEN Inject 1-7 Units Subcutaneous 4 times daily  Qty: 3 mL, Refills: 1    Associated Diagnoses: Type 2 diabetes mellitus with diabetic autonomic neuropathy, without long-term current use of insulin (H)      montelukast (SINGULAIR) 10 MG tablet Take 10  mg by mouth At Bedtime       pantoprazole (PROTONIX) 40 MG EC tablet Take 1 tablet (40 mg) by mouth every morning  Qty: 60 tablet, Refills: 5    Associated Diagnoses: Gastroesophageal reflux disease without esophagitis      predniSONE (DELTASONE) 5 MG tablet Take 1 tablet (5 mg) by mouth daily  Qty: 30 tablet, Refills: 2    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  Qty: 1 kit, Refills: 0    Associated Diagnoses: Type 2 diabetes mellitus with diabetic neuropathy, without long-term current use of insulin (H)      blood glucose VI test strip Use to test blood sugar four times daily or as directed.  Qty: 200 each, Refills: 0    Associated Diagnoses: Type 2 diabetes mellitus with diabetic neuropathy, without long-term current use of insulin (H)      cyanocobalamin (VITAMIN B12) 1000 MCG/ML injection Inject 1,000 mcg into the muscle every 30 days      insulin pen needle (32G X 4 MM) 32G X 4 MM miscellaneous Use four pen needles daily or as directed.  Qty: 110 each, Refills: 0    Associated Diagnoses: Type 2 diabetes mellitus with diabetic neuropathy, without long-term current use of insulin (H)      metolazone (ZAROXOLYN) 2.5 MG tablet Take 1 tablet (2.5 mg) by mouth as needed (take ONLY as directed by VAD team)  Qty: 5 tablet, Refills: 0    Associated Diagnoses: Chronic systolic congestive heart failure (H); LVAD (left ventricular assist device) present  (H)         STOP taking these medications       enoxaparin ANTICOAGULANT (LOVENOX) 60 MG/0.6ML syringe Comments:   Reason for Stopping:             Allergies   Allergies   Allergen Reactions     Beer Shortness Of Breath     Grass Shortness Of Breath     Ace Inhibitors Cough     Dust Mites Other (See Comments)     Asthma     Mold Other (See Comments)     Asthma     Penicillins Other (See Comments)     Unknown - childhood exposure    Tolerated Zosyn 9/18-9/20/2020     Sulfa Drugs Other (See Comments) and Unknown     Unknown childhood reaction

## 2021-01-11 NOTE — CONSULTS
[facility] Rheumatology Consultation    Jim Willingham MRN# [id number]   Age: 65 year old YOB: 1955     Date of Admission:  1/7/2021    Reason for consult: Gout       Requesting physician: Cardiology 2       Level of consult: One-time consult to assist in determining a diagnosis, recommend an appropriate treatment plan and place orders           Assessment and Recommendation:   Assessment:  1. Monarticular gout flare  66 y/o M with h/o NICM s/p HM II LVAD on AC, CKD stg III, HTN, and gout hospitalized for management of HFrEF exacerbation complicated by gout flare of his L knee. He is currently on chronic urate lowering therapy of allopurinol 300 mg daily, having been on 100 mg allopurinol daily and 20 mg prednisone daily Despite receiving prednisone 30 mg daily x 3 days from 1/5-1/7, his pain returned 1/8-1/9. His gout flare was managed with restarting of prednisone 30 mg daily on 1/9 and continuing til today. Given that patient is a heart transplant candidate, concern for risk of chronic drive line infection and fluid retention, will administer intraarticular hydrocortisone and schedule close outpatient follow up for gout management.    Recommendations:   -Will perform L knee intraarticular steroid injection today  -Prednisone 20 mg x 1 dose tomorrow  -Resume chronic uric acid lowering therapy  -Patient to follow up with outpatient Rheumatology for gout management      Procedure Note:     Patient was notified of the small risk of bleeding or infection from the procedure of a left knee steroid injection, and with informed consent agreed to proceed.    The area was sterilely prepped, and cutaneous anesthesia obtained with 1 cc of 1% lidocaine.    40 mg of Kenalog was injected together with an additional 1 cc of 1% lidocaine through a 25-gauge inch and a half needle.  The patient tolerated the procedure well.  There were no complications.    He did have some immediate relief of symptoms with  "extension and flexion of the knee which he now could do to nearly full flexion without significant pain, consistent with a lidocaine effect within the joint.    He will follow up as noted above.    I discussed  the patient with the resident, and saw  Him independently and completed the joint injection as noted above.  My exam and recomendations are as described.      Wing Bowen -[phone]             Chief Complaint:   L Knee pain     History is obtained from the patient         History of Present Illness:   This patient is a 65 year old black male with a significant past medical history of NICM s/p LVAD placement on warfarin, chronic kidney disease stage III, COPD and hypertension who is admitted for HFrEF exacerbation. Rheumatology was consulted for management of a gout flare that developed during patient's hospitalization.    Patient reports his gout flare beginning 1/2, noticing there was pain in his R foot that was mild and constant. He states his pain migrated to his left knee and became more intense, consistent with his previous gout flares. Patient reports calling his Rheumatologist on 1/5, for which he received a 3 day course of prednisone 30 mg daily with resolution of symptoms. Patient states he was hospitalized after his right heart catheter numbers suggested he was volume overloaded. During his treatment, patient developed severe L knee pain not responsive to home tramadol and acetaminophen.  He notes being started on prednisone 30 mg daily from 1/9 - 1/11 with improvement, remarking that he used to take \"allopurinol 10 mg and prednisone 20 mg daily for many years\". No fever, chills, CP, SOB, cough, abd barnett, n/v/d.                                               Past Medical History:     Past Medical History:   Diagnosis Date     Benign essential hypertension 5/11/2017     Bilateral carpal tunnel syndrome 11/2/2020     Cardiomyopathy, unspecified (H) 5/8/2017     CKD (chronic kidney disease) " stage 3, GFR 30-59 ml/min 5/11/2017     COPD (chronic obstructive pulmonary disease) (H) 11/2/2020     Depression 5/11/2017     Diabetes mellitus (H) 5/11/2017     Gouty arthropathy, chronic, without tophi 11/2/2020     H/O gastric bypass 5/11/2017     ICD (implantable cardioverter-defibrillator), biventricular, in situ 5/11/2017     LVAD (left ventricular assist device) present (H)      Major depression, recurrent, chronic (H) 11/2/2020     NICM (nonischemic cardiomyopathy) (H)/ EF 20% 5/11/2017    ECHO: LVEDd. 7.66 cm, Restrictive pattern , Severe mitral valve regurgitation     CECILIA (obstructive sleep apnea) 5/11/2017     Paroxysmal atrial fibrillation (H) 5/11/2017     Paroxysmal VT (H) 5/11/2017     Rotator cuff tear arthropathy of both shoulders 11/2/2020     Uncomplicated asthma      Vitamin B12 deficiency (non anemic) 5/11/2017             Past Surgical History:     Past Surgical History:   Procedure Laterality Date     ANESTHESIA CARDIOVERSION N/A 5/11/2020    Procedure: ANESTHESIA, FOR CARDIOVERSION @1100;  Surgeon: GENERIC ANESTHESIA PROVIDER;  Location:  OR     CV RIGHT HEART CATH N/A 7/24/2019    Procedure: CV RIGHT HEART CATH;  Surgeon: Renu Sears MD;  Location:  HEART CARDIAC CATH LAB     CV RIGHT HEART CATH N/A 8/5/2020    Procedure: CV RIGHT HEART CATH;  Surgeon: Nicola Seth MD;  Location:  HEART CARDIAC CATH LAB     CV RIGHT HEART CATH N/A 1/7/2021    Procedure: CV RIGHT HEART CATH;  Surgeon: Jac Dover MD;  Location:  HEART CARDIAC CATH LAB     GI SURGERY  2003    [facility]     INSERT VENTRICULAR ASSIST DEVICE LEFT (HEARTMATE II) N/A 6/19/2017    Procedure: INSERT VENTRICULAR ASSIST DEVICE LEFT (HEARTMATE II);  Median Sternotomy Heartmate II Left Ventricular Assist Device Insertion on Pump Oxygenator;  Surgeon: Ronnie Quigley MD;  Location:  OR     ORTHOPEDIC SURGERY  1994    right knee wired     PICC DOUBLE LUMEN PLACEMENT Right 09/23/2020    5FR PICC DL. Length-43cm  (1cm out).             Social History:     Social History     Tobacco Use     Smoking status: Former Smoker     Quit date:      Years since quittin.0     Smokeless tobacco: Never Used   Substance Use Topics     Alcohol use: No             Family History:     Family History   Problem Relation Age of Onset     Cerebrovascular Disease Mother 64     Diabetes Mother      Hypertension Mother      Coronary Artery Disease Father      Diabetes Type 2  Father      Obesity Brother      Obesity Brother      Cerebrovascular Disease Daughter 40     Family history reviewed          Immunizations:   Immunizations are up to date          Allergies:   All allergies reviewed and addressed          Medications:     Current Facility-Administered Medications   Medication     acetaminophen (TYLENOL) tablet 650 mg     albuterol (PROAIR HFA/PROVENTIL HFA/VENTOLIN HFA) 108 (90 Base) MCG/ACT inhaler 2 puff     allopurinol (ZYLOPRIM) tablet 300 mg     amiodarone (PACERONE) tablet 400 mg     aspirin (ASA) chewable tablet 81 mg     bumetanide (BUMEX) tablet 4 mg     buPROPion (WELLBUTRIN XL) 24 hr tablet 150 mg     glucose gel 15-30 g    Or     dextrose 50 % injection 25-50 mL    Or     glucagon injection 1 mg     fluticasone-vilanterol (BREO ELLIPTA) 200-25 MCG/INH inhaler 1 puff     gabapentin (NEURONTIN) capsule 300 mg     gabapentin (NEURONTIN) tablet 600 mg     hydrALAZINE (APRESOLINE) tablet 75 mg     insulin aspart (NovoLOG) injection (RAPID ACTING)     insulin aspart (NovoLOG) injection (RAPID ACTING)     insulin glargine (LANTUS PEN) injection 6 Units     isosorbide dinitrate (ISORDIL) tablet 10 mg     lidocaine 1% with EPINEPHrine 1:100,000 1 %-1:[id number] injection     montelukast (SINGULAIR) tablet 10 mg     naloxone (NARCAN) injection 0.2 mg    Or     naloxone (NARCAN) injection 0.4 mg    Or     naloxone (NARCAN) injection 0.2 mg    Or     naloxone (NARCAN) injection 0.4 mg     pantoprazole (PROTONIX) EC tablet 40 mg      Patient is already receiving anticoagulation with heparin, enoxaparin (LOVENOX), warfarin (COUMADIN)  or other anticoagulant medication     potassium chloride ER (KLOR-CON M) CR tablet 40 mEq     predniSONE (DELTASONE) tablet 5 mg     Reason ACE/ARB/ARNI order not selected     traMADol (ULTRAM) tablet 50 mg     umeclidinium (INCRUSE ELLIPTA) 62.5 MCG/INH inhaler 1 puff     Warfarin Therapy Reminder (Check START DATE - warfarin may be starting in the FUTURE)     zinc sulfate (ZINCATE) capsule 220 mg             Review of Systems:   The Review of Systems is negative other than noted in the HPI          Physical Exam (Resident / Clinician):   Vitals were reviewed  Temp: 98.5  F (36.9  C) Temp src: Oral BP: 95/64 Pulse: 76   Resp: 18 SpO2: 97 % O2 Device: None (Room air)     Gen: Well appearing, lying flat, NAD.    HEENT: Anicteric sclera.   Cardiac: LVAD hum present  Pulm: Speaking in full sentences on room air. CTAB posteriorly.   GI: Soft, non tender and non distended.  Ext: FROM x 4 extremities. The L knee is warm to touch with a small to moderate effusion, and mildly tender over the anterior aspect. No visible or palpable  tophi present. The L knee is able to partially flex with mild pain  Skin: Warm, dry.  Neuro: Normal speech, no facial droop.                                                            Data:       Lab Results   Component Value Date    WBC 5.8 01/09/2021    WBC 5.7 01/08/2021    WBC 7.5 01/07/2021    HGB 9.7 (L) 01/09/2021    HGB 10.4 (L) 01/08/2021    HGB 9.8 (L) 01/07/2021    HCT 32.5 (L) 01/09/2021    HCT 36.3 (L) 01/08/2021    HCT 33.7 (L) 01/07/2021    MCV 84 01/09/2021    MCV 86 01/08/2021    MCV 85 01/07/2021     01/09/2021     01/08/2021     01/07/2021               Discussed case with Dr. Bowen.    Pollo Contreras MD  IM PGY3

## 2021-01-11 NOTE — CONSULTS
Care Management Initial Consult    General Information  Assessment completed with: Patient,    Type of CM/SW Visit: Initial Assessment    Primary Care Provider verified and updated as needed: Yes   Readmission within the last 30 days: no previous admission in last 30 days      Reason for Consult: discharge planning  Advance Care Planning: Not addressed today       Communication Assessment  Patient's communication style: spoken language (English or Bilingual)    Hearing Difficulty or Deaf: no   Wear Glasses or Blind: no    Cognitive  Cognitive/Neuro/Behavioral: WDL                      Living Environment:   People in home: grandchild(lidia), spouse     Current living Arrangements: house      Able to return to prior arrangements: yes       Family/Social Support:  Care provided by: self  Provides care for: grandchild(lidia)  Marital Status:   Wife          Description of Support System: Supportive, Involved      Current Resources:   Skilled Home Care Services: None  Community Resources: [facility]  Equipment currently used at home: none  Supplies currently used at home: Other(LVAD dressing supplies)    Employment/Financial:  Financial Concerns: other (see comments), pt states that he has had financial stressors as his wife has been out of work since the summer. Pt states he is receiving support from family and able to pay for medications/bills at this time, but notes it is stressful.     Lifestyle & Psychosocial Needs:    Socioeconomic History     Marital status:      Spouse name: Not on file     Number of children: Not on file     Years of education: Not on file     Highest education level: Not on file     Tobacco Use     Smoking status: Former Smoker     Quit date:      Years since quittin.0     Smokeless tobacco: Never Used   Substance and Sexual Activity     Alcohol use: No     Drug use: No     Sexual activity: Yes     Partners: Female     Functional Status:  Prior to admission  "patient needed assistance: Pt independent with his cares prior to admission. Wife does LVAD dressing changes at home.    Values/Beliefs:  Spiritual, Cultural Beliefs, Protestant Practices, Values that affect care: Buddhism    Additional Information:  This writer met with pt to discuss discharge planning, pt hopeful to return home later today and looking forward to seeing his granddaughter whom lives with him. Pt states he feels the best \"physically and emotionally\" that he has in some time. Pt states stressors at home are beginning to improve and he feels that continued work with his therapist has been helpful. Pt states that he was not receiving home care services prior to admission and does not feel he has any home care needs at this time.     CC will continue to monitor patient's medical condition and progress towards discharge.  Rin Ceballos RN BSN  6C Unit Care Coordinator  Phone number: [phone]  Pager: [phone]        "

## 2021-01-11 NOTE — PLAN OF CARE
Dx HF exacerbation, weight gain, SOB. Diuresing. Weight down 20 pounds from admission. To discharge home today once rheumatology and EP consults are done. 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 VSS, AO X 4, paced rhythm. Continuing to monitor.

## 2021-01-12 ENCOUNTER — PREP FOR PROCEDURE (OUTPATIENT)
Dept: ORTHOPEDICS | Facility: CLINIC | Age: 66
End: 2021-01-12

## 2021-01-12 ENCOUNTER — CARE COORDINATION (OUTPATIENT)
Dept: CARDIOLOGY | Facility: CLINIC | Age: 66
End: 2021-01-12

## 2021-01-12 ENCOUNTER — OFFICE VISIT (OUTPATIENT)
Dept: ORTHOPEDICS | Facility: CLINIC | Age: 66
End: 2021-01-12
Payer: COMMERCIAL

## 2021-01-12 ENCOUNTER — DOCUMENTATION ONLY (OUTPATIENT)
Dept: ANTICOAGULATION | Facility: CLINIC | Age: 66
End: 2021-01-12

## 2021-01-12 ENCOUNTER — PATIENT OUTREACH (OUTPATIENT)
Dept: CARE COORDINATION | Facility: CLINIC | Age: 66
End: 2021-01-12

## 2021-01-12 ENCOUNTER — TELEPHONE (OUTPATIENT)
Dept: ORTHOPEDICS | Facility: CLINIC | Age: 66
End: 2021-01-12

## 2021-01-12 VITALS — BODY MASS INDEX: 31.67 KG/M2 | OXYGEN SATURATION: 97 % | HEART RATE: 78 BPM | WEIGHT: 209 LBS | HEIGHT: 68 IN

## 2021-01-12 DIAGNOSIS — Z79.01 LONG TERM CURRENT USE OF ANTICOAGULANT THERAPY: ICD-10-CM

## 2021-01-12 DIAGNOSIS — I50.22 CHRONIC SYSTOLIC CONGESTIVE HEART FAILURE (H): Primary | ICD-10-CM

## 2021-01-12 DIAGNOSIS — G56.03 BILATERAL CARPAL TUNNEL SYNDROME: Primary | ICD-10-CM

## 2021-01-12 DIAGNOSIS — G56.01 RIGHT CARPAL TUNNEL SYNDROME: ICD-10-CM

## 2021-01-12 DIAGNOSIS — Z95.811 LVAD (LEFT VENTRICULAR ASSIST DEVICE) PRESENT (H): ICD-10-CM

## 2021-01-12 DIAGNOSIS — G56.01 RIGHT CARPAL TUNNEL SYNDROME: Primary | ICD-10-CM

## 2021-01-12 DIAGNOSIS — Z79.899 ON AMIODARONE THERAPY: ICD-10-CM

## 2021-01-12 DIAGNOSIS — I50.22 CHRONIC SYSTOLIC CONGESTIVE HEART FAILURE (H): ICD-10-CM

## 2021-01-12 DIAGNOSIS — I47.29 PAROXYSMAL VT (H): Primary | ICD-10-CM

## 2021-01-12 PROCEDURE — 99204 OFFICE O/P NEW MOD 45 MIN: CPT | Performed by: ORTHOPAEDIC SURGERY

## 2021-01-12 ASSESSMENT — MIFFLIN-ST. JEOR: SCORE: 1717.52

## 2021-01-12 NOTE — PROGRESS NOTES
ANTICOAGULATION  MANAGEMENT: Discharge Review    Jim Willingham chart reviewed for anticoagulation continuity of care    Hospital Admission on 1/7-1/11 for Volume Overload.    Discharge disposition: Home    Results:    Recent labs: (last 7 days)     01/07/21  0719 01/08/21  0410 01/09/21  0538 01/10/21  0438 01/11/21  0536   INR 2.88* 2.88* 2.64* 2.36* 2.17*     Anticoagulation inpatient management:     See calendar     Anticoagulation discharge instructions:     Warfarin dosing: 10mg Tu and 7.5mg ROW   Bridging: No   INR goal change: No      Medication changes affecting anticoagulation: No    Additional factors affecting anticoagulation: Yes: Pt had a left knee steroid injection on 1/11.    Pt to take Bumex 4mg BID, Hydralazine 75mg TID, Isorbide Dinitrate 10mg TID, and Potassium 60mEq Daily.    Continues with ASA 81mg Daily and Amiodarone 400mg Daily    Plan     No adjustment to anticoagulation plan needed    Patient not contacted    Anticoagulation Calendar updated    Radha Pedro RN

## 2021-01-12 NOTE — PROGRESS NOTES
Called pt to follow-up 24hr after discharge from the hospital. Pt reports he is feeling really well. Confirmed pt's medication dose changes as well as follow-up labs requested for 1/15. Pt stated he will call to make a lab appt. Pt will see Dr. Montgomery in clinic on 1/21, with labs and PFT's prior.

## 2021-01-12 NOTE — LETTER
1/12/2021         RE: Jim Willingham  [address] [facility] [address]        Dear Colleague,    Thank you for referring your patient, Jim Willingham, to the [facility]. Please see a copy of my visit note below.    SUBJECTIVE:  Jim Willingham is a 65 year old male who is seen in consultation at the request of Dr. Ag, for Bilateral hand problems x years  Symptoms:   Has numbness and tingling in radial 3 digits.  Other symptoms include dropping things due to sensation issues, fine motor tasks difficult..     Treatment: previous carpal tunnel corticosteroid injection left side on 11/27. Helped severe burning.    The symptoms not helped by a splint.  .  Suspected cause: Due to unknown factors.      Job description: not employed    Past Medical History:   Diagnosis Date     Benign essential hypertension 5/11/2017     Bilateral carpal tunnel syndrome 11/2/2020     Cardiomyopathy, unspecified (H) 5/8/2017     CKD (chronic kidney disease) stage 3, GFR 30-59 ml/min 5/11/2017     COPD (chronic obstructive pulmonary disease) (H) 11/2/2020     Depression 5/11/2017     Diabetes mellitus (H) 5/11/2017     Gouty arthropathy, chronic, without tophi 11/2/2020     H/O gastric bypass 5/11/2017     ICD (implantable cardioverter-defibrillator), biventricular, in situ 5/11/2017     LVAD (left ventricular assist device) present (H)      Major depression, recurrent, chronic (H) 11/2/2020     NICM (nonischemic cardiomyopathy) (H)/ EF 20% 5/11/2017    ECHO: LVEDd. 7.66 cm, Restrictive pattern , Severe mitral valve regurgitation     CECILIA (obstructive sleep apnea) 5/11/2017     Paroxysmal atrial fibrillation (H) 5/11/2017     Paroxysmal VT (H) 5/11/2017     Rotator cuff tear arthropathy of both shoulders 11/2/2020     Uncomplicated asthma      Vitamin B12 deficiency (non anemic) 5/11/2017      Past Surgical History:   Procedure Laterality Date     ANESTHESIA CARDIOVERSION N/A 5/11/2020    Procedure:  ANESTHESIA, FOR CARDIOVERSION @1100;  Surgeon: GENERIC ANESTHESIA PROVIDER;  Location: [facility] OR     CV RIGHT HEART CATH N/A 7/24/2019    Procedure: CV RIGHT HEART CATH;  Surgeon: Renu Sears MD;  Location:  HEART CARDIAC CATH LAB     CV RIGHT HEART CATH N/A 8/5/2020    Procedure: CV RIGHT HEART CATH;  Surgeon: Nicola Seth MD;  Location:  HEART CARDIAC CATH LAB     GI SURGERY  2003    [facility]     INSERT VENTRICULAR ASSIST DEVICE LEFT (HEARTMATE II) N/A 6/19/2017    Procedure: INSERT VENTRICULAR ASSIST DEVICE LEFT (HEARTMATE II);  Median Sternotomy Heartmate II Left Ventricular Assist Device Insertion on Pump Oxygenator;  Surgeon: Ronnie Quigley MD;  Location: [facility] OR     ORTHOPEDIC SURGERY  1994    right knee wired     PICC DOUBLE LUMEN PLACEMENT Right 09/23/2020    5FR PICC DL. Length-43cm (1cm out).        REVIEW OF SYSTEMS:  Just got of hospital for CHF.  Treatment with diuretics.  CONSTITUTIONAL:  NEGATIVE for fever, chills, change in weight  INTEGUMENTARY/SKIN:  NEGATIVE for worrisome rashes, moles or lesions  EYES:  NEGATIVE for vision changes or irritation  ENT/MOUTH:  NEGATIVE for ear, mouth and throat problems  RESP:  NEGATIVE for significant cough or SOB and breathing is better since diuretics.  BREAST:  NEGATIVE for masses, tenderness or discharge  CV:  NEGATIVE for chest pain, palpitations or peripheral edema  GI:  NEGATIVE for nausea, abdominal pain, heartburn, or change in bowel habits  :  Negative   MUSCULOSKELETAL:  See HPI above  NEURO:  See HPI above  ENDOCRINE:  NEGATIVE for temperature intolerance, skin/hair changes  HEME/ALLERGY/IMMUNE:  NEGATIVE for bleeding problems  PSYCHIATRIC:  NEGATIVE for changes in mood or affect    EXAM:  GENERAL APPEARANCE: healthy, alert and no distress   GAIT: NORMAL  PSYCH:  mentation appears normal and affect normal/bright  SKIN: no suspicious lesions or rashes  NEURO: reflexes normal in upper extremities.   Vascular: Good capp refill and pulses  RESP:  No increased work of breathing  LYMPH:  No lymphedema    MSK/Neuro:   strength: normal,   positive  thenar fasciculations.  Thenar atrophy:Severe.   Sensation diminished in  Radial 4 digits,     Range of Motion wrist, digits: All Normal  Special tests: Tinel's positive at right cubital, Phalen's negative.    EMG:   10/24/17:        ASSESSMENT/PLAN  Severe bilateral Carpal tunnel syndrome   Guyon's canal syndrome right wrist  Cubital tunnel syndrome left elbow.  Ulnar nerve symptoms are minial     We talked about the options, which included repeat EMG, activity modification, night splinting, therapy, corticosteroid injection, medrol dose pack, and carpal tunnel release.      We decided to proceed with right Carpal tunnel release, since it's worst and he is left handed.  MAC anesthesia preferred vs WALANT .  We need to find out if he can have this procedure at the surgery center.  A block was discussed as well.  Usually, anesthesia administers these blocks with some sedation, so that is not particularly an advantage over a MAC.  A Alexandra block is an option as well but there is always the danger of the arrhythmogenic effects of the local anesthetic.    We discussed the procedure of open carpal tunnel release. We discussed the risks, which include but are not limited to incisional tenderness/pillar pain, infection, minor or major neurovascular injury, tendon laceration, finger stiffness and failure to relieve symptoms.  We also discussed the alternatives, including nonsurgical options, and alternatives surgical options such as minimally invasive carpal tunnel release.  We discussed the pros and cons of open carpal tunnel release versus minimally invasive carpal tunnel release.      He prefers a [facility] facility for his procedure, so if unable to do this at the [facility], then will have to be done at the  with the hand service.    HIGINIO Whelan MD  Dept. Orthopedic Surgery  [facility]      Again,  thank you for allowing me to participate in the care of your patient.        Sincerely,        Dann Whelan MD

## 2021-01-12 NOTE — PROGRESS NOTES
Previous Messages    ----- Message -----   From: Darcie Gonzales MD   Sent: 1/11/2021   3:58 PM CST   To: Zeynep Dailey, RN, *     Saw him in hospital today for consult; he had a shock last month.   I see that he has a remote monitor interrogation scheduled for March.   Can I see him in clinic in June, with labs for amiodarone? Please do remote interrogation a few days before appointment; I'll only have him see device clinic if any programming changes needed.   Thanks.      Orders placed, sent to clinic coordinators to schedule.    JOSEPH SalasN, RN, PHN  Electrophysiology Nurse Coordinator

## 2021-01-12 NOTE — PLAN OF CARE
DISCHARGE   Discharged to: Home  Via: Automobile  Accompanied by: RN down to pharmacy then front lobby  Discharge Instructions: diet, activity, medications, follow up appointments, when to call the MD, and what to watchout for (i.e. s/s of infection, increasing SOB, palpitations, chest pain,)  Prescriptions: To be filled by [facility] Discharge pharmacy per pt's request; medication list reviewed & sent with pt  Follow Up Appointments: arranged; information given  Belongings: All sent with pt  IV: out  Telemetry: off  Pt exhibits understanding of above discharge instructions; all questions answered.  Discharge Paperwork: faxed  _____    Pt alert and oriented x4. VSS. LVAD free of alarms, numbers WDL. Dressing CDI. Per pt report, dressing done weekly, due next on Wednesday, 1/13. Pt reports L knee arthritic pain; PRN tylenol and tramadol given 1x. Rheumatology saw pt at bedside this afternoon; intraarticular steroid injection also given to L knee. Appetite good, denies nausea. Discharge education done at the bedside with pt. All questions answered and addressed. Personal items (including LVAD supplies/backup bag) packed and ready to go. Writer RN brought pt down via wheelchair to discharge pharmacy then to front door where pt's wife will be picking up pt.

## 2021-01-12 NOTE — PROGRESS NOTES
Date: 1/12/2021 Status: Evaristo   Time: 9:15 AM Length: 15   Visit Type: NEW [656] VITO:     Provider: Dann Whelan MD Department: [facility] ORTHO SURG     Patient has clinic visit within 24-48 hours of Discharge so no post DC follow up call is needed

## 2021-01-12 NOTE — TELEPHONE ENCOUNTER
Spoke with patient who states that Dr Whelan was going to speak with patients cardiologist to make sure he is not considered high risk for surgery due to his cardiac issues. Will await to hear back from Dr Whelan and call when he hears back.

## 2021-01-13 PROBLEM — G56.01 RIGHT CARPAL TUNNEL SYNDROME: Status: ACTIVE | Noted: 2021-01-13

## 2021-01-15 ENCOUNTER — ANTICOAGULATION THERAPY VISIT (OUTPATIENT)
Dept: ANTICOAGULATION | Facility: CLINIC | Age: 66
End: 2021-01-15

## 2021-01-15 ENCOUNTER — CARE COORDINATION (OUTPATIENT)
Dept: CARDIOLOGY | Facility: CLINIC | Age: 66
End: 2021-01-15

## 2021-01-15 DIAGNOSIS — Z79.01 LONG TERM CURRENT USE OF ANTICOAGULANT THERAPY: ICD-10-CM

## 2021-01-15 DIAGNOSIS — I50.22 CHRONIC SYSTOLIC CONGESTIVE HEART FAILURE (H): ICD-10-CM

## 2021-01-15 DIAGNOSIS — I50.22 CHRONIC SYSTOLIC CONGESTIVE HEART FAILURE (H): Primary | ICD-10-CM

## 2021-01-15 DIAGNOSIS — Z95.811 LVAD (LEFT VENTRICULAR ASSIST DEVICE) PRESENT (H): ICD-10-CM

## 2021-01-15 LAB
ANION GAP SERPL CALCULATED.3IONS-SCNC: 4 MMOL/L (ref 3–14)
BUN SERPL-MCNC: 53 MG/DL (ref 7–30)
CALCIUM SERPL-MCNC: 8.2 MG/DL (ref 8.5–10.1)
CHLORIDE SERPL-SCNC: 101 MMOL/L (ref 94–109)
CO2 SERPL-SCNC: 31 MMOL/L (ref 20–32)
CREAT SERPL-MCNC: 1.9 MG/DL (ref 0.66–1.25)
GFR SERPL CREATININE-BSD FRML MDRD: 37 ML/MIN/{1.73_M2}
GLUCOSE SERPL-MCNC: 241 MG/DL (ref 70–99)
INR PPP: 2.44 (ref 0.86–1.14)
MAGNESIUM SERPL-MCNC: 2.6 MG/DL (ref 1.6–2.3)
POTASSIUM SERPL-SCNC: 4.7 MMOL/L (ref 3.4–5.3)
SODIUM SERPL-SCNC: 136 MMOL/L (ref 133–144)

## 2021-01-15 PROCEDURE — 36415 COLL VENOUS BLD VENIPUNCTURE: CPT | Performed by: INTERNAL MEDICINE

## 2021-01-15 PROCEDURE — 83735 ASSAY OF MAGNESIUM: CPT | Performed by: INTERNAL MEDICINE

## 2021-01-15 PROCEDURE — 80048 BASIC METABOLIC PNL TOTAL CA: CPT | Performed by: INTERNAL MEDICINE

## 2021-01-15 PROCEDURE — 85610 PROTHROMBIN TIME: CPT | Performed by: INTERNAL MEDICINE

## 2021-01-15 NOTE — PROGRESS NOTES
Pt had labs drawn today as follow-up from discharge. Noted increase in creat to 1.9 (from 1.69). Pt's weight at home is 214.8lb today. He weighed himself the day he was discharged from the hospital on 1/11 and it was 214lb on his home scale. Pt feels good other than a gout flare in his leg. Pt is on bumex 4BID and k 40 daily.   Reviewed labs with Dr. Montgomery. No changes at this time. Communicated plan to pt.     He has an appt with Dr. Montgomery on 1/21.

## 2021-01-15 NOTE — PROGRESS NOTES
ANTICOAGULATION FOLLOW-UP CLINIC VISIT    Patient Name:  Jim Willingham  Date:  1/15/2021  Contact Type:  Telephone    SUBJECTIVE:  Patient Findings     Positives:  Change in medications (started isordil, increased dose of hydralazine), Hospital admission (21 - 21 for fluid overload)    Comments:  Spoke with Jim.  Updated warfarin dosing calendar with dose he reported taking.          Clinical Outcomes     Comments:  Spoke with Jim.  Updated warfarin dosing calendar with dose he reported taking.             OBJECTIVE    Recent labs: (last 7 days)     01/15/21  1122   INR 2.44*       ASSESSMENT / PLAN  INR assessment THER    Recheck INR In: 4 DAYS    INR Location Clinic      Anticoagulation Summary  As of 1/15/2021    INR goal:  2.0-3.0   TTR:  57.2 % (10.6 mo)   INR used for dosin.44 (1/15/2021)   Warfarin maintenance plan:  10 mg (5 mg x 2) every Tue; 7.5 mg (5 mg x 1.5) all other days   Full warfarin instructions:  10 mg every Tue; 7.5 mg all other days   Weekly warfarin total:  55 mg   Plan last modified:  Kirsty Bermudez RN (1/15/2021)   Next INR check:  2021   Priority:  Critical   Target end date:  Indefinite    Indications    LVAD (left ventricular assist device) present (H) [Z95.811]  Long-term (current) use of anticoagulants [Z79.01] [Z79.01]  Chronic systolic congestive heart failure (H) [I50.22]             Anticoagulation Episode Summary     INR check location:      Preferred lab:      Send INR reminders to:  [facility]    Comments:  +++Patient drawn at Home care visit Q Mon. (8/10/20)+++  Labs drawn either at [facility] or [facility]  LVAD placed 17   II  ASA 81 mg daily      Anticoagulation Care Providers     Provider Role Specialty Phone number    Delisa Montgomery MD Referring Advanced Heart Failure and Transplant Cardiology [phone]            See the Encounter Report to view Anticoagulation Flowsheet and Dosing Calendar (Go to Encounters  tab in chart review, and find the Anticoagulation Therapy Visit)    Spoke with Jim.  He reports he has taken 7.5 mg of warfarin daily since 1/12/2021--calendar updated. He reports he does not have any signs of bleeding.    Patient had LVAD placed on:   6/19/17  Type of LVAD: HM 2   Patient's current Aspirin dose: 81 mg daily  LVAD Protocol followed: Yes     Kirsty Bermudez RN

## 2021-01-18 ENCOUNTER — VIRTUAL VISIT (OUTPATIENT)
Dept: PHARMACY | Facility: CLINIC | Age: 66
End: 2021-01-18
Attending: INTERNAL MEDICINE
Payer: COMMERCIAL

## 2021-01-18 DIAGNOSIS — E53.8 VITAMIN B12 DEFICIENCY (NON ANEMIC): ICD-10-CM

## 2021-01-18 DIAGNOSIS — I48.0 PAROXYSMAL ATRIAL FIBRILLATION (H): ICD-10-CM

## 2021-01-18 DIAGNOSIS — I50.22 CHRONIC SYSTOLIC CONGESTIVE HEART FAILURE (H): ICD-10-CM

## 2021-01-18 DIAGNOSIS — Z95.811 LVAD (LEFT VENTRICULAR ASSIST DEVICE) PRESENT (H): ICD-10-CM

## 2021-01-18 DIAGNOSIS — J44.9 CHRONIC OBSTRUCTIVE PULMONARY DISEASE, UNSPECIFIED COPD TYPE (H): Primary | ICD-10-CM

## 2021-01-18 DIAGNOSIS — G56.03 BILATERAL CARPAL TUNNEL SYNDROME: ICD-10-CM

## 2021-01-18 DIAGNOSIS — Z79.4 TYPE 2 DIABETES MELLITUS WITH COMPLICATION, WITH LONG-TERM CURRENT USE OF INSULIN (H): ICD-10-CM

## 2021-01-18 DIAGNOSIS — K21.00 GASTROESOPHAGEAL REFLUX DISEASE WITH ESOPHAGITIS WITHOUT HEMORRHAGE: ICD-10-CM

## 2021-01-18 DIAGNOSIS — E11.8 TYPE 2 DIABETES MELLITUS WITH COMPLICATION, WITH LONG-TERM CURRENT USE OF INSULIN (H): ICD-10-CM

## 2021-01-18 DIAGNOSIS — Z78.9 TAKES DIETARY SUPPLEMENTS: ICD-10-CM

## 2021-01-18 DIAGNOSIS — M1A.00X0 GOUTY ARTHROPATHY, CHRONIC, WITHOUT TOPHI: ICD-10-CM

## 2021-01-18 DIAGNOSIS — F33.9 MAJOR DEPRESSION, RECURRENT, CHRONIC (H): ICD-10-CM

## 2021-01-18 PROCEDURE — 99605 MTMS BY PHARM NP 15 MIN: CPT | Mod: TEL | Performed by: PHARMACIST

## 2021-01-18 PROCEDURE — 99607 MTMS BY PHARM ADDL 15 MIN: CPT | Mod: TEL | Performed by: PHARMACIST

## 2021-01-18 NOTE — PROGRESS NOTES
Medication Therapy Management (MTM) Encounter    ASSESSMENT:                            Medication Adherence/Access: No issues identified    NICM (EF 20%) s/p HM II LVAD placement (6/19/17) : Stable; patient follows closely with cardiology and has an appointment scheduled for follow up.    Afib: Stable.    Diabetes: Meeting A1c goal <7%. Due for updated eye and foot exam. Not on statin therapy. Evaluate further on follow up.    COPD: Could consider Trelegy as an alternative to Breo Ellipta and Incruse.    Gout: Stable, patient has follow up scheduled with rheumatology    GERD: Could consider trial off of pantoprazole unless otherwise indicated. Patient will discuss with cardiology.    Depression: Stable.    Vitamin B12 Deficiency: Due for updated labs and likely restarting therapy. Patient to discuss with cardiology.    Carpal Tunnel: Would benefit from decreasing dose of gabapentin due to kidney function. May benefit from using tramadol only as needed not scheduled. May benefit from a trial of acetaminophen arthritis in place of acetaminophen regular strength.    Supplements: Stable    PLAN:                            Patient to discuss the following with his providers, trial off of pantoprazole, Vitamin B12 level  Pharmacist to discuss with pulmonology considering change to Trelegy  Recommend using tramadol as needed, acetaminophen arthritis strength in place of regular strength  Recommend decreasing gabapentin to 300mg three times daily due to kidney function.    Follow-up: via [facility] after response from pulmonology    SUBJECTIVE/OBJECTIVE:                          Jim Willingham is a 65 year old male called for a transitions of care visit. He was discharged from [facility] on 1/11/2021 for Acute on chronic systolic heart failure with volume overload.      Reason for visit: Medication review post discharge.    Allergies/ADRs: Reviewed in chart  Tobacco: He reports that he quit smoking about 26 years ago. He has never  used smokeless tobacco.  Alcohol: beer once every 6 months  Caffeine: 0-3 cups/day of coffee; doesn't drink in the summer. Currently, has not had any coffee in the last week.  Activity: Limited due to deconditioning, his LVAD  Past Medical History: Reviewed in chart    Medication Adherence/Access: Patient takes medications directly from bottles.  Medication barriers: none.   The patient fills medications at [facility]: NO, fills medications at [facility].    NICM (EF 20%) s/p HM II LVAD placement (6/19/17) : current therapy includes bumetanide 4mg twice daily, potassium 60mq daily, hydralazine 75mg three times daily, isosorbide dinitrate 10mg three times daily, metolazone 2.5mg as needed (directed by LVAD team). MAP goal 65-80.  Patient has been weighing himself daily, today was 212.6lb. Weight has been stable since he has been home from the hospital. Patient has shortness of breath but not as bad as it has been. Denies abdominal distension, swelling in ankles and feet. Denies light headedness, dizziness, no orthostasis.     Afib: current therapy includes amiodarone 400mg daily, warfarin 7.5mg every day except Tuesdays, 10mg on Tuesday. Goal INR 2-3. Patient does develop some bruising.  INR   Date Value Ref Range Status   01/15/2021 2.44 (H) 0.86 - 1.14 Final      Type 2 Diabetes:  Currently taking Lantus 6 units daily, Humalog 1-7 units four times daily (uses a sliding scale, if less than 140mg/dL he doesn't take any). Patient is not experiencing side effects.  Blood sugar monitoring: two time(s) daily. Ranges (patient reported): 105mg/dL in the morning and usually less than 140mg/dL before meals.  Symptoms of low blood sugar? weak, sweaty  Symptoms of high blood sugar? polydipsia and polyuria  Eye exam: due  Foot exam: due  Aspirin: Taking 81mg daily and denies side effects  Statin: No   ACEi/ARB: No. Has cough with ACE; ARB stopped due to fluctuating kidney function  Urine Albumin: No results found for:  UMALCR   Lab Results   Component Value Date    A1C 6.2 01/07/2021    A1C 5.7 11/05/2020    A1C 7.5 08/05/2020    A1C 7.0 01/21/2020    A1C 6.7 05/24/2017     COPD: Current medications: ICS/LABA- Fluticasone/Vilanterol 200-25 1 puff daily  LAMA- Incruse Ellipta 1 puff(s) once daily  Montelukast (Singulair) once daily  Short-Acting Bronchodilator: Albuterol MDI. Patient uses about once a day. Patient rinses their mouth after using steroid inhaler.    Patient is not experiencing side effects.   Patient reports the following symptoms: none.  Patient does not have an COPD Action Plan on file.   Has spirometry been completed: Yes; 2017    Gout: Currently taking prednisone 5mg daily (has been on a maintenance dose of prednisone with the hopes of getting off of the prednisone) and allopurinol 300mg daily. Patient reports recent flare. Patient is experiencing the following medication side effects: none. He follows with rheumatology and has an appointment with them March.   Uric Acid   Date Value Ref Range Status   01/09/2021 5.0 3.5 - 7.2 mg/dL Final     GFR Estimate   Date Value Ref Range Status   01/15/2021 37 (L) >60 mL/min/[1.73_m2] Final     Comment:     Non  GFR Calc  Starting 12/18/2018, serum creatinine based estimated GFR (eGFR) will be   calculated using the Chronic Kidney Disease Epidemiology Collaboration   (CKD-EPI) equation.       GFR Estimate If Black   Date Value Ref Range Status   01/15/2021 42 (L) >60 mL/min/[1.73_m2] Final     Comment:      GFR Calc  Starting 12/18/2018, serum creatinine based estimated GFR (eGFR) will be   calculated using the Chronic Kidney Disease Epidemiology Collaboration   (CKD-EPI) equation.       GERD: Current medications include: Protonix (pantoprazole) 40mg once daily. Patient was told he was on to protect his stomach from the other medications. Patient does not report any reflux symptoms.     Depression:  Current medications include: Bupropion XL  150mg once daily. Pt reports that depression symptoms are somewhat improved.  PHQ-9 SCORE 2/13/2020 11/2/2020   PHQ-9 Total Score 1 4   In December 2020 was switched from citalopram 30mg daily to bupropion due to patient feeling fatigued and having little energy.  Patient does feel he has a little more energetic.     Vitamin B12 Deficiency: Current therapy includes cyanocobalamin 1000mcg IM monthly. Last B12 level 2019. Patient has not been getting B12 injections. He thinks he has not been on this for more than a year.     Carpal Tunnel: current therapy includes gabapentin 300mg twice daily and 600mg at bedtime. Patient is unsure if it helps. Patient is trying to have carpal tunnel release. Patient was feeling like knives are stabbing his fingers but since his injections he only is experiencing numbness. Patient will also take tramadol 1 tablet twice daily. Patient does not feel like tramadol helps carpal tunnel pain. It was initially prescribed for shoulder pain.    Supplements: current therapy includes zinc 220mg twice daily. Patient is taking to help boost his immune system and he feels this is helping.    Today's Vitals: There were no vitals taken for this visit.  ----------------  Post Discharge Medication Reconciliation Status: discharge medications reconciled, continue medications without change.    I spent 60 minutes with this patient today. All changes were made via collaborative practice agreement with Ricardo Gómez. A copy of the visit note was provided to the patient's primary care provider.    The patient was sent via [employer] a summary of these recommendations.     Angelina Recinos, Pharm.D, [facility]  Medication Therapy Management Pharmacist    Telemedicine Visit Details  Type of service:  Telephone visit  Start Time: 3:00PM  End Time: 4:00PM  Originating Location (patient location): Home  Distant Location (provider location):  [facility] MTM      Medication Therapy  Recommendations  Bilateral carpal tunnel syndrome    Current Medication: acetaminophen (TYLENOL) 325 MG tablet   Rationale: More effective medication available - Ineffective medication - Effectiveness   Recommendation: Change Medication - Acetaminophen 8 Hour 650 MG Tbcr   Status: Accepted - no CPA Needed          Current Medication: gabapentin (NEURONTIN) 300 MG capsule   Rationale: Dose too high - Dosage too high - Safety   Recommendation: Decrease Dose   Status: Accepted per CPA          Current Medication: traMADol (ULTRAM) 50 MG tablet   Rationale: Dose too high - Dosage too high - Safety   Recommendation: Decrease Frequency   Status: Patient Agreed - Adherence/Education         COPD (chronic obstructive pulmonary disease) (H)    Current Medication: umeclidinium (INCRUSE ELLIPTA) 62.5 MCG/INH oral inhaler   Rationale: More cost-effective medication available - Cost - Adherence   Recommendation: Change Medication - Trelegy Ellipta 200-62.5-25 MCG/INH Aepb   Status: Contact Provider - Awaiting Response         Gastroesophageal reflux disease with esophagitis without hemorrhage    Current Medication: pantoprazole (PROTONIX) 40 MG EC tablet   Rationale: No medical indication at this time - Unnecessary medication therapy - Indication   Recommendation: Discontinue Medication   Status: Contact Provider - Awaiting Response         Vitamin B12 deficiency (non anemic)    Current Medication: cyanocobalamin (VITAMIN B12) 1000 MCG/ML injection   Rationale: Medication requires monitoring - Needs additional monitoring   Recommendation: Order Lab   Status: Contact Provider - Awaiting Response

## 2021-01-19 ENCOUNTER — ANTICOAGULATION THERAPY VISIT (OUTPATIENT)
Dept: ANTICOAGULATION | Facility: CLINIC | Age: 66
End: 2021-01-19

## 2021-01-19 DIAGNOSIS — I50.22 CHRONIC SYSTOLIC CONGESTIVE HEART FAILURE (H): ICD-10-CM

## 2021-01-19 DIAGNOSIS — Z95.811 LVAD (LEFT VENTRICULAR ASSIST DEVICE) PRESENT (H): Primary | ICD-10-CM

## 2021-01-19 DIAGNOSIS — Z95.811 LVAD (LEFT VENTRICULAR ASSIST DEVICE) PRESENT (H): ICD-10-CM

## 2021-01-19 DIAGNOSIS — Z79.01 LONG TERM CURRENT USE OF ANTICOAGULANT THERAPY: ICD-10-CM

## 2021-01-19 LAB
ALBUMIN SERPL-MCNC: 3.9 G/DL (ref 3.4–5)
ALP SERPL-CCNC: 115 U/L (ref 40–150)
ALT SERPL W P-5'-P-CCNC: 49 U/L (ref 0–70)
ANION GAP SERPL CALCULATED.3IONS-SCNC: 7 MMOL/L (ref 3–14)
AST SERPL W P-5'-P-CCNC: 39 U/L (ref 0–45)
BILIRUB SERPL-MCNC: 0.9 MG/DL (ref 0.2–1.3)
BUN SERPL-MCNC: 52 MG/DL (ref 7–30)
CALCIUM SERPL-MCNC: 8.6 MG/DL (ref 8.5–10.1)
CHLORIDE SERPL-SCNC: 100 MMOL/L (ref 94–109)
CO2 SERPL-SCNC: 28 MMOL/L (ref 20–32)
CREAT SERPL-MCNC: 1.81 MG/DL (ref 0.66–1.25)
ERYTHROCYTE [DISTWIDTH] IN BLOOD BY AUTOMATED COUNT: 21.5 % (ref 10–15)
GFR SERPL CREATININE-BSD FRML MDRD: 39 ML/MIN/{1.73_M2}
GLUCOSE SERPL-MCNC: 145 MG/DL (ref 70–99)
HCT VFR BLD AUTO: 37.2 % (ref 40–53)
HGB BLD-MCNC: 11.1 G/DL (ref 13.3–17.7)
INR PPP: 1.34 (ref 0.86–1.14)
LDH SERPL L TO P-CCNC: 374 U/L (ref 85–227)
MCH RBC QN AUTO: 25.8 PG (ref 26.5–33)
MCHC RBC AUTO-ENTMCNC: 29.8 G/DL (ref 31.5–36.5)
MCV RBC AUTO: 87 FL (ref 78–100)
PLATELET # BLD AUTO: 241 10E9/L (ref 150–450)
POTASSIUM SERPL-SCNC: 3.9 MMOL/L (ref 3.4–5.3)
PROT SERPL-MCNC: 7.2 G/DL (ref 6.8–8.8)
RBC # BLD AUTO: 4.3 10E12/L (ref 4.4–5.9)
SODIUM SERPL-SCNC: 135 MMOL/L (ref 133–144)
WBC # BLD AUTO: 9 10E9/L (ref 4–11)

## 2021-01-19 PROCEDURE — 80053 COMPREHEN METABOLIC PANEL: CPT | Performed by: INTERNAL MEDICINE

## 2021-01-19 PROCEDURE — 36415 COLL VENOUS BLD VENIPUNCTURE: CPT | Performed by: INTERNAL MEDICINE

## 2021-01-19 PROCEDURE — 85610 PROTHROMBIN TIME: CPT | Performed by: INTERNAL MEDICINE

## 2021-01-19 PROCEDURE — 85027 COMPLETE CBC AUTOMATED: CPT | Performed by: INTERNAL MEDICINE

## 2021-01-19 PROCEDURE — 83615 LACTATE (LD) (LDH) ENZYME: CPT | Performed by: INTERNAL MEDICINE

## 2021-01-19 NOTE — PROGRESS NOTES
ANTICOAGULATION FOLLOW-UP CLINIC VISIT    Patient Name:  Jim Willingham  Date:  2021  Contact Type:  Telephone    SUBJECTIVE:  Patient Findings     Positives:  Change in health (-left knee steroid injection)             OBJECTIVE    Recent labs: (last 7 days)     21  1245   INR 1.34*       ASSESSMENT / PLAN  INR assessment SUB    Recheck INR In: 2 DAYS    INR Location Clinic      Anticoagulation Summary  As of 2021    INR goal:  2.0-3.0   TTR:  57.0 % (10.7 mo)   INR used for dosin.34 (2021)   Warfarin maintenance plan:  10 mg (5 mg x 2) every Tue; 7.5 mg (5 mg x 1.5) all other days   Full warfarin instructions:  : 10 mg; Otherwise 10 mg every Tue; 7.5 mg all other days   Weekly warfarin total:  55 mg   Plan last modified:  Kirsty Bermudez RN (1/15/2021)   Next INR check:  2021   Priority:  Critical   Target end date:  Indefinite    Indications    LVAD (left ventricular assist device) present (H) [Z95.811]  Long-term (current) use of anticoagulants [Z79.01] [Z79.01]  Chronic systolic congestive heart failure (H) [I50.22]             Anticoagulation Episode Summary     INR check location:      Preferred lab:      Send INR reminders to:  [facility]    Comments:  +++Patient drawn at Home care visit Q Mon. (8/10/20)+++  Labs drawn either at [facility] or [facility]  LVAD placed 17   II  ASA 81 mg daily      Anticoagulation Care Providers     Provider Role Specialty Phone number    Delisa Montgomery MD Referring Advanced Heart Failure and Transplant Cardiology [phone]            See the Encounter Report to view Anticoagulation Flowsheet and Dosing Calendar (Go to Encounters tab in chart review, and find the Anticoagulation Therapy Visit)  Spoke with patient X2 , and messaged Margaret Abdi, LVAD nurse. Lovenox 60mg every 12 hours to start, and next INR on .  Pt said that he has Lovenox at home, and will call if he needs more.  Patient had LVAD  placed on:   6/19/17  Type of LVAD: HM2  Patient's current Aspirin dose: 81mg  LVAD Protocol followed:  Yes  Delisa Hillman RN

## 2021-01-19 NOTE — PATIENT INSTRUCTIONS
Recommendations from today's MTM visit:                                                    MTM (medication therapy management) is a service provided by a clinical pharmacist designed to help you get the most of out of your medicines.      Patient to discuss the following with his providers, trial off of pantoprazole, Vitamin B12 level  Pharmacist to discuss with pulmonology considering change to Trelegy  Recommend using tramadol as needed, acetaminophen arthritis strength in place of regular strength  Recommend decreasing gabapentin to 300mg three times daily due to kidney function.    It was great to speak with you today.  I value your experience and would be very thankful for your time with providing feedback on our clinic survey. You may receive a survey via email or text message in the next few days.     Next MTM visit: after follow up from pulmonology/cardiology    To schedule another MTM appointment, please call the clinic directly or you may call the MTM scheduling line at [phone] or toll-free at [phone].     My Clinical Pharmacist's contact information:                                                      It was a pleasure talking with you today!  Please feel free to contact me with any questions or concerns you have.      Angelina Recinos, Pharm.D, BCACP  Medication Therapy Management Pharmacist

## 2021-01-20 ENCOUNTER — MEDICAL CORRESPONDENCE (OUTPATIENT)
Dept: HEALTH INFORMATION MANAGEMENT | Facility: CLINIC | Age: 66
End: 2021-01-20

## 2021-01-21 ENCOUNTER — OFFICE VISIT (OUTPATIENT)
Dept: CARDIOLOGY | Facility: CLINIC | Age: 66
End: 2021-01-21
Attending: INTERNAL MEDICINE
Payer: COMMERCIAL

## 2021-01-21 ENCOUNTER — ANTICOAGULATION THERAPY VISIT (OUTPATIENT)
Dept: ANTICOAGULATION | Facility: CLINIC | Age: 66
End: 2021-01-21

## 2021-01-21 VITALS
HEIGHT: 68 IN | WEIGHT: 224.1 LBS | HEART RATE: 88 BPM | SYSTOLIC BLOOD PRESSURE: 80 MMHG | OXYGEN SATURATION: 99 % | BODY MASS INDEX: 33.96 KG/M2

## 2021-01-21 DIAGNOSIS — I50.22 CHRONIC SYSTOLIC CONGESTIVE HEART FAILURE (H): Primary | ICD-10-CM

## 2021-01-21 DIAGNOSIS — Z95.811 LVAD (LEFT VENTRICULAR ASSIST DEVICE) PRESENT (H): ICD-10-CM

## 2021-01-21 DIAGNOSIS — I50.22 CHRONIC SYSTOLIC CONGESTIVE HEART FAILURE (H): ICD-10-CM

## 2021-01-21 DIAGNOSIS — I50.9 CONGESTIVE HEART FAILURE, UNSPECIFIED HF CHRONICITY, UNSPECIFIED HEART FAILURE TYPE (H): ICD-10-CM

## 2021-01-21 DIAGNOSIS — Z95.811 LVAD (LEFT VENTRICULAR ASSIST DEVICE) PRESENT (H): Primary | ICD-10-CM

## 2021-01-21 DIAGNOSIS — Z79.01 LONG TERM CURRENT USE OF ANTICOAGULANT THERAPY: ICD-10-CM

## 2021-01-21 LAB
ANION GAP SERPL CALCULATED.3IONS-SCNC: 4 MMOL/L (ref 3–14)
BUN SERPL-MCNC: 60 MG/DL (ref 7–30)
CALCIUM SERPL-MCNC: 8.6 MG/DL (ref 8.5–10.1)
CHLORIDE SERPL-SCNC: 104 MMOL/L (ref 94–109)
CO2 SERPL-SCNC: 27 MMOL/L (ref 20–32)
CREAT SERPL-MCNC: 2.22 MG/DL (ref 0.66–1.25)
GFR SERPL CREATININE-BSD FRML MDRD: 30 ML/MIN/{1.73_M2}
GLUCOSE SERPL-MCNC: 174 MG/DL (ref 70–99)
INR PPP: 1.65 (ref 0.86–1.14)
IRON SATN MFR SERPL: 10 % (ref 15–46)
IRON SERPL-MCNC: 36 UG/DL (ref 35–180)
POTASSIUM SERPL-SCNC: 4.2 MMOL/L (ref 3.4–5.3)
SODIUM SERPL-SCNC: 136 MMOL/L (ref 133–144)
TIBC SERPL-MCNC: 381 UG/DL (ref 240–430)

## 2021-01-21 PROCEDURE — 99214 OFFICE O/P EST MOD 30 MIN: CPT | Mod: 25 | Performed by: INTERNAL MEDICINE

## 2021-01-21 PROCEDURE — 83550 IRON BINDING TEST: CPT | Performed by: PATHOLOGY

## 2021-01-21 PROCEDURE — 94375 RESPIRATORY FLOW VOLUME LOOP: CPT | Performed by: INTERNAL MEDICINE

## 2021-01-21 PROCEDURE — 80048 BASIC METABOLIC PNL TOTAL CA: CPT | Performed by: PATHOLOGY

## 2021-01-21 PROCEDURE — 94726 PLETHYSMOGRAPHY LUNG VOLUMES: CPT | Performed by: INTERNAL MEDICINE

## 2021-01-21 PROCEDURE — 94729 DIFFUSING CAPACITY: CPT | Performed by: INTERNAL MEDICINE

## 2021-01-21 PROCEDURE — 36415 COLL VENOUS BLD VENIPUNCTURE: CPT | Performed by: PATHOLOGY

## 2021-01-21 PROCEDURE — 93750 INTERROGATION VAD IN PERSON: CPT | Performed by: INTERNAL MEDICINE

## 2021-01-21 PROCEDURE — 85610 PROTHROMBIN TIME: CPT | Performed by: PATHOLOGY

## 2021-01-21 PROCEDURE — 83540 ASSAY OF IRON: CPT | Performed by: PATHOLOGY

## 2021-01-21 ASSESSMENT — MIFFLIN-ST. JEOR: SCORE: 1786.01

## 2021-01-21 ASSESSMENT — PAIN SCALES - GENERAL: PAINLEVEL: NO PAIN (0)

## 2021-01-21 NOTE — PATIENT INSTRUCTIONS
Medications:  1. Discontinue your pantoprazole.  2. No other medication changes    Instructions:  1. Your BMI and your PFTs are where we want them!   2. Keep working on your conditioning, try to improve a little every day.    Follow-up: (make these appointments before you leave)  1. Please follow-up with Dr. Montgomery on 2/5/21 months with labs prior. This appointment is already scheduled.     Page the VAD Coordinator on call if you gain more than 3 lb in a day or 5 in a week. Please also page if you feel unwell or have alarms.     Great to see you in clinic today. To Page the VAD Coordinator on call, dial [phone] option #[phone] and ask to speak to the VAD coordinator on call.

## 2021-01-21 NOTE — LETTER
1/21/2021      RE: Jim Willingham  [address] [place] [id number]       Dear Colleague,    Thank you for the opportunity to participate in the care of your patient, Jim Willingham, at the [facility] HEART [facility] at [facility]. Please see a copy of my visit note below.    See above    January 21, 2021      HPI:   65 year old male with a past medical history of NICM (EF 20%) s/p HM II LVAD placement (6/19/17) at DT (obesity)     Staph hominis bacteremia and completed 6 weeks of IV vancomycin he has had no recurrence of symptoms.     The last several months of been very difficult with recurrent ventricular arrhythmias, refractory fluid overload.  He was just admitted to the hospital where it was found that his pulmonary capillary wedge pressure was elevated.     Number of medication changes were made include increased speed as well as increased afterload reduction.     His weight has been relatively stable from the time of discharge although he feels pretty fatigued overall and less strong than he did immediately after the LVAD.     His weight is finally in the range that we could consider cardiac transplant and pulmonary function tests were repeated today showing improvement in his lung function.     He presently denies PND orthopnea.     Other recent issues:      atrial fibrillation starting earlier this year.  He did see electrophysiology as the pacemaker settings were changed and there was some discussion about whether or not he should have a cardioversion or just leave him in atrial fibrillation.  He was eventually cardioverted and is on amiodarone.  He did have symptomatic improvement with this.     He denies fevers, chills, driveline redness, tenderness, or discharge. No LVAD alarms or blood in the urine or stools.      PAST MEDICAL HISTORY:  Past Medical History:   Diagnosis Date     Benign essential hypertension 5/11/2017     Bilateral carpal tunnel  syndrome 11/2/2020     Cardiomyopathy, unspecified (H) 5/8/2017     CKD (chronic kidney disease) stage 3, GFR 30-59 ml/min 5/11/2017     COPD (chronic obstructive pulmonary disease) (H) 11/2/2020     Depression 5/11/2017     Diabetes mellitus (H) 5/11/2017     Gouty arthropathy, chronic, without tophi 11/2/2020     H/O gastric bypass 5/11/2017     ICD (implantable cardioverter-defibrillator), biventricular, in situ 5/11/2017     LVAD (left ventricular assist device) present (H)      Major depression, recurrent, chronic (H) 11/2/2020     NICM (nonischemic cardiomyopathy) (H)/ EF 20% 5/11/2017    ECHO: LVEDd. 7.66 cm, Restrictive pattern , Severe mitral valve regurgitation     CECILIA (obstructive sleep apnea) 5/11/2017     Paroxysmal atrial fibrillation (H) 5/11/2017     Paroxysmal VT (H) 5/11/2017     Rotator cuff tear arthropathy of both shoulders 11/2/2020     Uncomplicated asthma      Vitamin B12 deficiency (non anemic) 5/11/2017       FAMILY HISTORY:  Family History   Problem Relation Age of Onset     Cerebrovascular Disease Mother 64     Diabetes Mother      Hypertension Mother      Coronary Artery Disease Father      Diabetes Type 2  Father      Obesity Brother      Obesity Brother      Cerebrovascular Disease Daughter 40     SOCIAL HISTORY:  No changes     CURRENT MEDICATIONS:  Current Outpatient Medications   Medication Sig Dispense Refill     ACE/ARB/ARNI NOT PRESCRIBED (INTENTIONAL)        acetaminophen (TYLENOL) 325 MG tablet Take 650 mg by mouth every 6 hours as needed for mild pain       albuterol (PROAIR HFA) 108 (90 Base) MCG/ACT inhaler Inhale 2 puffs into the lungs every 4 hours as needed for shortness of breath / dyspnea or wheezing        allopurinol (ZYLOPRIM) 300 MG tablet Take 1 tablet (300 mg) by mouth daily 90 tablet 1     amiodarone (PACERONE) 200 MG tablet Take 400 mg by mouth daily       aspirin 81 MG chewable tablet 1 tablet (81 mg) by Oral or Feeding Tube route daily 36 tablet      blood  glucose monitoring (ONE TOUCH ULTRA 2) meter device kit Use to test blood sugar four times daily or as directed. 1 kit 0     blood glucose VI test strip Use to test blood sugar four times daily or as directed. 200 each 0     bumetanide (BUMEX) 2 MG tablet Take 2 tablets (4 mg) by mouth 2 times daily 540 tablet 3     buPROPion (WELLBUTRIN XL) 150 MG 24 hr tablet Take 1 tablet (150 mg) by mouth every morning 60 tablet 1     cyanocobalamin (VITAMIN B12) 1000 MCG/ML injection Inject 1,000 mcg into the muscle every 30 days       enoxaparin ANTICOAGULANT (LOVENOX) 60 MG/0.6ML syringe Inject 0.6 mLs (60 mg) Subcutaneous every 12 hours 10 mL 1     fluticasone-vilanterol (BREO ELLIPTA) 200-25 MCG/INH oral inhaler Inhale 1 puff into the lungs daily       gabapentin (NEURONTIN) 300 MG capsule 300mg twice daily + 600mg at bedtime       hydrALAZINE (APRESOLINE) 25 MG tablet Take 3 tablets (75 mg) by mouth 3 times daily 150 tablet 0     insulin glargine (LANTUS SOLOSTAR) 100 UNIT/ML pen Inject 6 Units Subcutaneous At Bedtime 9 mL 3     insulin lispro (HUMALOG KWIKPEN) 100 UNIT/ML (1 unit dial) KWIKPEN Inject 1-7 Units Subcutaneous 4 times daily 3 mL 1     insulin pen needle (32G X 4 MM) 32G X 4 MM miscellaneous Use four pen needles daily or as directed. 110 each 0     isosorbide dinitrate (ISORDIL) 10 MG tablet Take 1 tablet (10 mg) by mouth 3 times daily 90 tablet 0     metolazone (ZAROXOLYN) 2.5 MG tablet Take 1 tablet (2.5 mg) by mouth as needed (take ONLY as directed by VAD team) 5 tablet 0     montelukast (SINGULAIR) 10 MG tablet Take 10 mg by mouth At Bedtime        pantoprazole (PROTONIX) 40 MG EC tablet Take 1 tablet (40 mg) by mouth every morning 60 tablet 5     potassium chloride ER (KLOR-CON M) 20 MEQ CR tablet Take 3 tablets (60 mEq) by mouth daily 90 tablet 0     predniSONE (DELTASONE) 5 MG tablet Take 1 tablet (5 mg) by mouth daily 30 tablet 2     traMADol (ULTRAM) 50 MG tablet Take 2 tablets (100 mg) by mouth every  "6 hours as needed for moderate pain or breakthrough pain 28 tablet      umeclidinium (INCRUSE ELLIPTA) 62.5 MCG/INH oral inhaler Inhale 1 puff into the lungs daily       warfarin ANTICOAGULANT (COUMADIN) 2.5 MG tablet Take 7.5 mg daily except Tuesdays then take 10 mg on that day or as directed by the anticoagulation clinic 50 tablet 0     zinc sulfate (ZINCATE) 220 (50 Zn) MG capsule Take 220 mg by mouth 2 times daily       Review of systems: 12 point review of systems negative unless noted in the HPI-   Of note he has not had substantial wheezing recently        EXAM:  BP (!) 80/0 (BP Location: Right arm, Patient Position: Chair, Cuff Size: Adult Regular)   Pulse 88   Ht 1.727 m (5' 8\")   Wt 101.7 kg (224 lb 1.6 oz)   SpO2 99%   BMI 34.07 kg/m    General: appears comfortable,  Head: normocephalic, atraumatic  Eyes: anicteric sclera, EOMI  Neck: no adenopathy JVP 10-11 cm   Orophyarynx: moist mucosa, no lesions, dentition intact  Heart:LVAD hum, PMI diffuse   Lungs: clear, no rales or wheezing  Abdomen: soft, non-tender, bowel sounds present  Extremities: no clubbing, cyanosis no LE edema   Neurological: normal speech and affect, no gross motor deficits       Interpretation Summary  HM2 at 9400RPM.  Severely (EF <30%) reduced left ventricular function is present. Mild left  ventricular dilation is present. LVIDd 61mm.  Septum midline.  Aortic valve remain closed with no AI. Normal inflow velocities (62 cm/s).  Outflow velocity cannot be assessed.  Global right ventricular function is mildly to moderately reduced.  Dilation of the inferior vena cava is present with abnormal respiratory  variation in diameter suggests a high RA pressure estimated at 15 mmHg or  greater.  Mild to moderate mitral insufficiency is present.     This study was compared with the study from 8/5/2020 .  IVC is dilated now with higher RA pressure. Otherwise no significant change        LVAD interrogation 1/21/2021: Agree with coordinator " note.  No LVAD alarms, no speed drops     Assessment and Plan:    In summary Jim Willingham has a history of NICM (EF 20%) s/p HM II LVAD as DT (initially DT due to obesity).      Overall Jim has been struggling for the last 3 to 4 months.  We have had persistent volume overload, last right heart catheterization showed very elevated wedge pressure therefore he is now at a higher speed.  He has had recurrent atrial and ventricular arrhythmias.  Worsening kidney function as well as bacteremia.    All of this is taken its toll.     I am really happy to see that his pulmonary function tests show improvement in his FEV1 today.  This is the highest it has been since LVAD implantation-he is also had a relatively uneventful last year with respect to needing bronchodilators, steroids or antibiotics.     At this point I think we are at a Crossroads with whether or not we move forward with transplant.  He is not going to do well with LVAD alone in my opinion from this point forward.     He has lost the weight that he is needed to lose, his pulmonary function is better, he would meet status 3 criteria based on his infection-     I am proposing to present him next week-we will try to get a decision from the team about whether or not we can list him.     With regard to his volume status I am not decreasing his diuretics today as I see neck veins and I do not want to lose ground.       Chronic systolic heart failure secondary to NICM.    Stage D    NYHA Class III  ACEi/ARB: Presently not on ACE inhibitor due to rising creatinine  Other afterload reduction: Presently on hydralazine 50- 3 times a day, also on Imdur 30  BB: on hold given decompensated heart failure and low output by last right heart catheterization  Aldosterone antagonist:  note given concerns of low GFR  SCD prophylaxis: CRT-D  Fluid status: Slightly volume up-we will continue diuretics present dose as his weight has been relatively stable from the time of  discharge        Staph hominis bacteremia: cleared-now on oral suppression    CKD Stage III:-Creatinine improved in the hospital with diuretic management and can increase to be     Paroxysmal Atrial Fibrillation- in sinus rhythm presently - see VT plan above, on AC     History of ventricular tachycardia-recent recurrence.  I am hoping that control of his fluid will help keep his rhythm stable.  I would like to drop his amiodarone soon if I can given risk for pulmonary function although we have had both atrial and ventricular arrhythmias    paroxysmal AF: in sinus presently -on amiodarone 400 mg a day-again looking to lower this if we have some stability over the next    COPD: per pulmonary they have seen him and feel he can make it through surgery although I am worried about the need for long-term oxygen later on    History of iron deficiency; sending iron studies again-Will replace if needed-have to check to see when he is due for another colonoscopy    We will presented next week at the transplant meeting.  We need to see him in a few weeks time and repeating another basic metabolic panel next week       Delisa Montgomery MD   of Medicine   [facility] Division of Cardiology         Please do not hesitate to contact me if you have any questions/concerns.     Sincerely,     Delisa Montgomery MD

## 2021-01-21 NOTE — NURSING NOTE
1). PUMP DATA  Primary controller serial number: [id number]    HM II:   Flow: 6.6 L/min,    Speed: 9600 RPMs,     PI: 4.5 ,  Power: 6.8 Manuel,      Primary controller   Back up battery: Patient use: 52, Replace in: 14  Months     Data downloaded: No   Equipment and driveline assessed for damage: Yes     Back up : Serial number: [id number]  Back up battery: Patient use: 4 Replace in: 17  Months  Programmed settings identical to the settings on the primary controller :Yes      Education complete: Yes   Charge the BACKUP controller s backup battery every 6 months  Perform a self test on BACKUP every 6 months  Change the MPU s batteries every 6 months:Yes  Have equipment serviced yearly (if applicable):Yes              2). ALARMS  Alarms reported by patient since last pump evaluation: No  Alarms or other finding noted during pump data history and alarm download: Yes PI events. Rare speed drop. PI range 2.9-4.9.     Action Taken:  Reviewed data with patient: Yes      3). DRESSING CHANGE / DRIVELINE ASSESSMENT  Dressing change completed today: No  Appearance of Driveline site: WN weekly dressing    Driveline stabilization: Method: [employer]  [ Teaching reinforced on need for stabilization of Driveline. ]

## 2021-01-21 NOTE — NURSING NOTE
Chief Complaint   Patient presents with     Follow Up     VAD 1 week post hospital      Vitals were taken and medications were reconciled.     Silvia López RMA  3:22 PM

## 2021-01-21 NOTE — PROGRESS NOTES
ANTICOAGULATION FOLLOW-UP CLINIC VISIT    Patient Name:  Jim Willingham  Date:  2021  Contact Type:  Telephone    SUBJECTIVE:  Patient Findings     Comments:  Patient will continue with Lovenox 60mg (Dr. Montgomery ordered 0.5mg/kg) Q 12 hours.  Jim has labs scheduled tomorrow so will test another INR then.         Clinical Outcomes     Comments:  Patient will continue with Lovenox 60mg (Dr. Montgomery ordered 0.5mg/kg) Q 12 hours.  Jim has labs scheduled tomorrow so will test another INR then.            OBJECTIVE    Recent labs: (last 7 days)     21  1350   INR 1.65*       ASSESSMENT / PLAN  No question data found.  Anticoagulation Summary  As of 2021    INR goal:  2.0-3.0   TTR:  56.7 % (10.8 mo)   INR used for dosin.65 (2021)   Warfarin maintenance plan:  10 mg (5 mg x 2) every Tue; 7.5 mg (5 mg x 1.5) all other days   Full warfarin instructions:  : 10 mg; Otherwise 10 mg every Tue; 7.5 mg all other days   Weekly warfarin total:  55 mg   Plan last modified:  Kirsty Bermudez RN (1/15/2021)   Next INR check:  2021   Priority:  Critical   Target end date:  Indefinite    Indications    LVAD (left ventricular assist device) present (H) [Z95.811]  Long-term (current) use of anticoagulants [Z79.01] [Z79.01]  Chronic systolic congestive heart failure (H) [I50.22]             Anticoagulation Episode Summary     INR check location:      Preferred lab:      Send INR reminders to:  [facility]    Comments:  +++Patient drawn at Home care visit Q Mon. (8/10/20)+++  Labs drawn either at [facility] or [facility]  LVAD placed 17   II  ASA 81 mg daily      Anticoagulation Care Providers     Provider Role Specialty Phone number    Delisa Montgomery MD Referring Advanced Heart Failure and Transplant Cardiology [phone]            See the Encounter Report to view Anticoagulation Flowsheet and Dosing Calendar (Go to Encounters tab in chart review, and find the  Anticoagulation Therapy Visit)    Spoke with patient.     Maru Alonso RN

## 2021-01-22 ENCOUNTER — ANTICOAGULATION THERAPY VISIT (OUTPATIENT)
Dept: ANTICOAGULATION | Facility: CLINIC | Age: 66
End: 2021-01-22

## 2021-01-22 DIAGNOSIS — Z79.01 LONG TERM CURRENT USE OF ANTICOAGULANT THERAPY: ICD-10-CM

## 2021-01-22 DIAGNOSIS — I50.22 CHRONIC SYSTOLIC CONGESTIVE HEART FAILURE (H): ICD-10-CM

## 2021-01-22 DIAGNOSIS — Z95.811 LVAD (LEFT VENTRICULAR ASSIST DEVICE) PRESENT (H): ICD-10-CM

## 2021-01-22 LAB
DLCOCOR-%PRED-PRE: 76 %
DLCOCOR-PRE: 19.56 ML/MIN/MMHG
DLCOUNC-%PRED-PRE: 67 %
DLCOUNC-PRE: 17.31 ML/MIN/MMHG
DLCOUNC-PRED: 25.67 ML/MIN/MMHG
ERV-%PRED-PRE: 20 %
ERV-PRE: 0.15 L
ERV-PRED: 0.72 L
EXPTIME-PRE: 9.02 SEC
FEF2575-%PRED-PRE: 19 %
FEF2575-PRE: 0.5 L/SEC
FEF2575-PRED: 2.56 L/SEC
FEFMAX-%PRED-PRE: 56 %
FEFMAX-PRE: 4.83 L/SEC
FEFMAX-PRED: 8.6 L/SEC
FEV1-%PRED-PRE: 40 %
FEV1-PRE: 1.24 L
FEV1FEV6-PRE: 56 %
FEV1FEV6-PRED: 79 %
FEV1FVC-PRE: 52 %
FEV1FVC-PRED: 78 %
FEV1SVC-PRE: 44 %
FEV1SVC-PRED: 66 %
FIFMAX-PRE: 5.23 L/SEC
FRCPLETH-%PRED-PRE: 104 %
FRCPLETH-PRE: 3.71 L
FRCPLETH-PRED: 3.53 L
FVC-%PRED-PRE: 60 %
FVC-PRE: 2.38 L
FVC-PRED: 3.92 L
IC-%PRED-PRE: 67 %
IC-PRE: 2.64 L
IC-PRED: 3.89 L
INR PPP: 1.61 (ref 0.86–1.14)
RVPLETH-%PRED-PRE: 146 %
RVPLETH-PRE: 3.56 L
RVPLETH-PRED: 2.43 L
TLCPLETH-%PRED-PRE: 93 %
TLCPLETH-PRE: 6.35 L
TLCPLETH-PRED: 6.77 L
VA-%PRED-PRE: 75 %
VA-PRE: 4.62 L
VC-%PRED-PRE: 60 %
VC-PRE: 2.79 L
VC-PRED: 4.62 L
VIT B12 SERPL-MCNC: 450 PG/ML (ref 193–986)

## 2021-01-22 PROCEDURE — 82607 VITAMIN B-12: CPT | Performed by: INTERNAL MEDICINE

## 2021-01-22 PROCEDURE — 36416 COLLJ CAPILLARY BLOOD SPEC: CPT | Performed by: INTERNAL MEDICINE

## 2021-01-22 PROCEDURE — 85610 PROTHROMBIN TIME: CPT | Performed by: INTERNAL MEDICINE

## 2021-01-22 NOTE — PROGRESS NOTES
ANTICOAGULATION FOLLOW-UP CLINIC VISIT    Patient Name:  Jim Willingham  Date:  2021  Contact Type:  Telephone    SUBJECTIVE:  Patient Findings     Comments:  Left message for Jim to continue Lovenox injections, 60mg every 12 hours.        Clinical Outcomes     Comments:  Left message for Jim to continue Lovenox injections, 60mg every 12 hours.           OBJECTIVE    Recent labs: (last 7 days)     21  0958   INR 1.61*       ASSESSMENT / PLAN  INR assessment SUB    Recheck INR In: 3 DAYS    INR Location Clinic      Anticoagulation Summary  As of 2021    INR goal:  2.0-3.0   TTR:  56.5 % (10.8 mo)   INR used for dosin.61 (2021)   Warfarin maintenance plan:  10 mg (5 mg x 2) every Mon, Wed, Fri; 7.5 mg (5 mg x 1.5) all other days   Full warfarin instructions:  10 mg every Mon, Wed, Fri; 7.5 mg all other days   Weekly warfarin total:  60 mg   Plan last modified:  Brendan oMntoya RN (2021)   Next INR check:  2021   Priority:  Critical   Target end date:  Indefinite    Indications    LVAD (left ventricular assist device) present (H) [Z95.811]  Long-term (current) use of anticoagulants [Z79.01] [Z79.01]  Chronic systolic congestive heart failure (H) [I50.22]             Anticoagulation Episode Summary     INR check location:      Preferred lab:      Send INR reminders to:  [facility]    Comments:  +++Patient drawn at Home care visit Q Mon. (8/10/20)+++  Labs drawn either at [facility] or [facility]  LVAD placed 17   II  ASA 81 mg daily      Anticoagulation Care Providers     Provider Role Specialty Phone number    Delisa Montgomery MD Referring Advanced Heart Failure and Transplant Cardiology [phone]            See the Encounter Report to view Anticoagulation Flowsheet and Dosing Calendar (Go to Encounters tab in chart review, and find the Anticoagulation Therapy Visit)    Left message for patient with results and dosing recommendations. Asked  patient to call back to report any missed doses, falls, signs and symptoms of bleeding or clotting, any changes in health, medication, or diet. Asked patient to call back with any questions or concerns.    Lovenox injections continue.  Consulted Marilu Merino, Pharm-D.  Patient had LVAD placed on:   6/19/17  Type of LVAD: HM 2  Patient's current Aspirin dose: 81mg  LVAD Protocol followed:  Yes.   If Not Followed Explanation:  Brendan Garcia, RN

## 2021-01-23 NOTE — PROGRESS NOTES
January 21, 2021      HPI:   65 year old male with a past medical history of NICM (EF 20%) s/p HM II LVAD placement (6/19/17) at DT (obesity)     Staph hominis bacteremia and completed 6 weeks of IV vancomycin he has had no recurrence of symptoms.     The last several months of been very difficult with recurrent ventricular arrhythmias, refractory fluid overload.  He was just admitted to the hospital where it was found that his pulmonary capillary wedge pressure was elevated.     Number of medication changes were made include increased speed as well as increased afterload reduction.     His weight has been relatively stable from the time of discharge although he feels pretty fatigued overall and less strong than he did immediately after the LVAD.     His weight is finally in the range that we could consider cardiac transplant and pulmonary function tests were repeated today showing improvement in his lung function.     He presently denies PND orthopnea.     Other recent issues:      atrial fibrillation starting earlier this year.  He did see electrophysiology as the pacemaker settings were changed and there was some discussion about whether or not he should have a cardioversion or just leave him in atrial fibrillation.  He was eventually cardioverted and is on amiodarone.  He did have symptomatic improvement with this.     He denies fevers, chills, driveline redness, tenderness, or discharge. No LVAD alarms or blood in the urine or stools.      PAST MEDICAL HISTORY:  Past Medical History:   Diagnosis Date     Benign essential hypertension 5/11/2017     Bilateral carpal tunnel syndrome 11/2/2020     Cardiomyopathy, unspecified (H) 5/8/2017     CKD (chronic kidney disease) stage 3, GFR 30-59 ml/min 5/11/2017     COPD (chronic obstructive pulmonary disease) (H) 11/2/2020     Depression 5/11/2017     Diabetes mellitus (H) 5/11/2017     Gouty arthropathy, chronic, without tophi 11/2/2020     H/O gastric bypass 5/11/2017      ICD (implantable cardioverter-defibrillator), biventricular, in situ 5/11/2017     LVAD (left ventricular assist device) present (H)      Major depression, recurrent, chronic (H) 11/2/2020     NICM (nonischemic cardiomyopathy) (H)/ EF 20% 5/11/2017    ECHO: LVEDd. 7.66 cm, Restrictive pattern , Severe mitral valve regurgitation     CECILIA (obstructive sleep apnea) 5/11/2017     Paroxysmal atrial fibrillation (H) 5/11/2017     Paroxysmal VT (H) 5/11/2017     Rotator cuff tear arthropathy of both shoulders 11/2/2020     Uncomplicated asthma      Vitamin B12 deficiency (non anemic) 5/11/2017       FAMILY HISTORY:  Family History   Problem Relation Age of Onset     Cerebrovascular Disease Mother 64     Diabetes Mother      Hypertension Mother      Coronary Artery Disease Father      Diabetes Type 2  Father      Obesity Brother      Obesity Brother      Cerebrovascular Disease Daughter 40     SOCIAL HISTORY:  No changes     CURRENT MEDICATIONS:  Current Outpatient Medications   Medication Sig Dispense Refill     ACE/ARB/ARNI NOT PRESCRIBED (INTENTIONAL)        acetaminophen (TYLENOL) 325 MG tablet Take 650 mg by mouth every 6 hours as needed for mild pain       albuterol (PROAIR HFA) 108 (90 Base) MCG/ACT inhaler Inhale 2 puffs into the lungs every 4 hours as needed for shortness of breath / dyspnea or wheezing        allopurinol (ZYLOPRIM) 300 MG tablet Take 1 tablet (300 mg) by mouth daily 90 tablet 1     amiodarone (PACERONE) 200 MG tablet Take 400 mg by mouth daily       aspirin 81 MG chewable tablet 1 tablet (81 mg) by Oral or Feeding Tube route daily 36 tablet      blood glucose monitoring (ONE TOUCH ULTRA 2) meter device kit Use to test blood sugar four times daily or as directed. 1 kit 0     blood glucose VI test strip Use to test blood sugar four times daily or as directed. 200 each 0     bumetanide (BUMEX) 2 MG tablet Take 2 tablets (4 mg) by mouth 2 times daily 540 tablet 3     buPROPion (WELLBUTRIN XL) 150  MG 24 hr tablet Take 1 tablet (150 mg) by mouth every morning 60 tablet 1     cyanocobalamin (VITAMIN B12) 1000 MCG/ML injection Inject 1,000 mcg into the muscle every 30 days       enoxaparin ANTICOAGULANT (LOVENOX) 60 MG/0.6ML syringe Inject 0.6 mLs (60 mg) Subcutaneous every 12 hours 10 mL 1     fluticasone-vilanterol (BREO ELLIPTA) 200-25 MCG/INH oral inhaler Inhale 1 puff into the lungs daily       gabapentin (NEURONTIN) 300 MG capsule 300mg twice daily + 600mg at bedtime       hydrALAZINE (APRESOLINE) 25 MG tablet Take 3 tablets (75 mg) by mouth 3 times daily 150 tablet 0     insulin glargine (LANTUS SOLOSTAR) 100 UNIT/ML pen Inject 6 Units Subcutaneous At Bedtime 9 mL 3     insulin lispro (HUMALOG KWIKPEN) 100 UNIT/ML (1 unit dial) KWIKPEN Inject 1-7 Units Subcutaneous 4 times daily 3 mL 1     insulin pen needle (32G X 4 MM) 32G X 4 MM miscellaneous Use four pen needles daily or as directed. 110 each 0     isosorbide dinitrate (ISORDIL) 10 MG tablet Take 1 tablet (10 mg) by mouth 3 times daily 90 tablet 0     metolazone (ZAROXOLYN) 2.5 MG tablet Take 1 tablet (2.5 mg) by mouth as needed (take ONLY as directed by VAD team) 5 tablet 0     montelukast (SINGULAIR) 10 MG tablet Take 10 mg by mouth At Bedtime        pantoprazole (PROTONIX) 40 MG EC tablet Take 1 tablet (40 mg) by mouth every morning 60 tablet 5     potassium chloride ER (KLOR-CON M) 20 MEQ CR tablet Take 3 tablets (60 mEq) by mouth daily 90 tablet 0     predniSONE (DELTASONE) 5 MG tablet Take 1 tablet (5 mg) by mouth daily 30 tablet 2     traMADol (ULTRAM) 50 MG tablet Take 2 tablets (100 mg) by mouth every 6 hours as needed for moderate pain or breakthrough pain 28 tablet      umeclidinium (INCRUSE ELLIPTA) 62.5 MCG/INH oral inhaler Inhale 1 puff into the lungs daily       warfarin ANTICOAGULANT (COUMADIN) 2.5 MG tablet Take 7.5 mg daily except Tuesdays then take 10 mg on that day or as directed by the anticoagulation clinic 50 tablet 0     zinc  "sulfate (ZINCATE) 220 (50 Zn) MG capsule Take 220 mg by mouth 2 times daily       Review of systems: 12 point review of systems negative unless noted in the HPI-   Of note he has not had substantial wheezing recently        EXAM:  BP (!) 80/0 (BP Location: Right arm, Patient Position: Chair, Cuff Size: Adult Regular)   Pulse 88   Ht 1.727 m (5' 8\")   Wt 101.7 kg (224 lb 1.6 oz)   SpO2 99%   BMI 34.07 kg/m    General: appears comfortable,  Head: normocephalic, atraumatic  Eyes: anicteric sclera, EOMI  Neck: no adenopathy JVP 10-11 cm   Orophyarynx: moist mucosa, no lesions, dentition intact  Heart:LVAD hum, PMI diffuse   Lungs: clear, no rales or wheezing  Abdomen: soft, non-tender, bowel sounds present  Extremities: no clubbing, cyanosis no LE edema   Neurological: normal speech and affect, no gross motor deficits       Interpretation Summary  HM2 at 9400RPM.  Severely (EF <30%) reduced left ventricular function is present. Mild left  ventricular dilation is present. LVIDd 61mm.  Septum midline.  Aortic valve remain closed with no AI. Normal inflow velocities (62 cm/s).  Outflow velocity cannot be assessed.  Global right ventricular function is mildly to moderately reduced.  Dilation of the inferior vena cava is present with abnormal respiratory  variation in diameter suggests a high RA pressure estimated at 15 mmHg or  greater.  Mild to moderate mitral insufficiency is present.     This study was compared with the study from 8/5/2020 .  IVC is dilated now with higher RA pressure. Otherwise no significant change        LVAD interrogation 1/21/2021: Agree with coordinator note.  No LVAD alarms, no speed drops     Assessment and Plan:    In summary Jim Willingham has a history of NICM (EF 20%) s/p HM II LVAD as DT (initially DT due to obesity).      Overall Jim has been struggling for the last 3 to 4 months.  We have had persistent volume overload, last right heart catheterization showed very elevated wedge " pressure therefore he is now at a higher speed.  He has had recurrent atrial and ventricular arrhythmias.  Worsening kidney function as well as bacteremia.    All of this is taken its toll.     I am really happy to see that his pulmonary function tests show improvement in his FEV1 today.  This is the highest it has been since LVAD implantation-he is also had a relatively uneventful last year with respect to needing bronchodilators, steroids or antibiotics.     At this point I think we are at a Crossroads with whether or not we move forward with transplant.  He is not going to do well with LVAD alone in my opinion from this point forward.     He has lost the weight that he is needed to lose, his pulmonary function is better, he would meet status 3 criteria based on his infection-     I am proposing to present him next week-we will try to get a decision from the team about whether or not we can list him.     With regard to his volume status I am not decreasing his diuretics today as I see neck veins and I do not want to lose ground.       Chronic systolic heart failure secondary to NICM.    Stage D    NYHA Class III  ACEi/ARB: Presently not on ACE inhibitor due to rising creatinine  Other afterload reduction: Presently on hydralazine 50- 3 times a day, also on Imdur 30  BB: on hold given decompensated heart failure and low output by last right heart catheterization  Aldosterone antagonist:  note given concerns of low GFR  SCD prophylaxis: CRT-D  Fluid status: Slightly volume up-we will continue diuretics present dose as his weight has been relatively stable from the time of discharge        Staph hominis bacteremia: cleared-now on oral suppression    CKD Stage III:-Creatinine improved in the hospital with diuretic management and can increase to be     Paroxysmal Atrial Fibrillation- in sinus rhythm presently - see VT plan above, on AC     History of ventricular tachycardia-recent recurrence.  I am hoping that control  of his fluid will help keep his rhythm stable.  I would like to drop his amiodarone soon if I can given risk for pulmonary function although we have had both atrial and ventricular arrhythmias    paroxysmal AF: in sinus presently -on amiodarone 400 mg a day-again looking to lower this if we have some stability over the next    COPD: per pulmonary they have seen him and feel he can make it through surgery although I am worried about the need for long-term oxygen later on    History of iron deficiency; sending iron studies again-Will replace if needed-have to check to see when he is due for another colonoscopy    We will presented next week at the transplant meeting.  We need to see him in a few weeks time and repeating another basic metabolic panel next week       Delisa Montgomery MD   of Medicine   [facility] Division of Cardiology

## 2021-01-25 ENCOUNTER — ANTICOAGULATION THERAPY VISIT (OUTPATIENT)
Dept: ANTICOAGULATION | Facility: CLINIC | Age: 66
End: 2021-01-25

## 2021-01-25 DIAGNOSIS — Z79.01 LONG TERM CURRENT USE OF ANTICOAGULANT THERAPY: ICD-10-CM

## 2021-01-25 DIAGNOSIS — Z95.811 LVAD (LEFT VENTRICULAR ASSIST DEVICE) PRESENT (H): ICD-10-CM

## 2021-01-25 DIAGNOSIS — I50.22 CHRONIC SYSTOLIC CONGESTIVE HEART FAILURE (H): ICD-10-CM

## 2021-01-25 LAB — INR PPP: 0.95 (ref 0.86–1.14)

## 2021-01-25 PROCEDURE — 85610 PROTHROMBIN TIME: CPT | Performed by: INTERNAL MEDICINE

## 2021-01-25 PROCEDURE — 36416 COLLJ CAPILLARY BLOOD SPEC: CPT | Performed by: INTERNAL MEDICINE

## 2021-01-25 NOTE — PROGRESS NOTES
ANTICOAGULATION FOLLOW-UP CLINIC VISIT    Patient Name:  Jim Willingham  Date:  2021  Contact Type:  Telephone    SUBJECTIVE:  Patient Findings     Comments:  Patient confirms that he has taken all warfarin doses.  The only medication changes he reports is Tramadol was discontinued, Pantoprazole and Gabapentin dose was decreased from 1200mg. Patient will continue with Lovenox 60mg BID.         Clinical Outcomes     Comments:  Patient confirms that he has taken all warfarin doses.  The only medication changes he reports is Tramadol was discontinued, Pantoprazole and Gabapentin dose was decreased from 1200mg. Patient will continue with Lovenox 60mg BID.            OBJECTIVE    Recent labs: (last 7 days)     21  1419   INR 0.95       ASSESSMENT / PLAN  INR assessment SUB    Recheck INR In: 3 DAYS    INR Location Clinic      Anticoagulation Summary  As of 2021    INR goal:  2.0-3.0   TTR:  56.0 % (10.9 mo)   INR used for dosin.95 (2021)   Warfarin maintenance plan:  10 mg (5 mg x 2) every day   Full warfarin instructions:  : 12.5 mg; Otherwise 10 mg every day   Weekly warfarin total:  70 mg   Plan last modified:  Maru Alonso RN (2021)   Next INR check:  2021   Priority:  Critical   Target end date:  Indefinite    Indications    LVAD (left ventricular assist device) present (H) [Z95.811]  Long-term (current) use of anticoagulants [Z79.01] [Z79.01]  Chronic systolic congestive heart failure (H) [I50.22]             Anticoagulation Episode Summary     INR check location:      Preferred lab:      Send INR reminders to:  [facility]    Comments:  +++Patient drawn at Home care visit Q Mon. (8/10/20)+++  Labs drawn either at [facility] or [facility]  LVAD placed 17   II  ASA 81 mg daily      Anticoagulation Care Providers     Provider Role Specialty Phone number    Delisa Montgomery MD Referring Advanced Heart Failure and Transplant Cardiology  [phone]            See the Encounter Report to view Anticoagulation Flowsheet and Dosing Calendar (Go to Encounters tab in chart review, and find the Anticoagulation Therapy Visit)    Spoke with patient.     Maru Alonso RN

## 2021-01-26 ENCOUNTER — OFFICE VISIT (OUTPATIENT)
Dept: FAMILY MEDICINE | Facility: CLINIC | Age: 66
End: 2021-01-26
Payer: COMMERCIAL

## 2021-01-26 ENCOUNTER — TELEPHONE (OUTPATIENT)
Dept: PHARMACY | Facility: CLINIC | Age: 66
End: 2021-01-26

## 2021-01-26 VITALS
SYSTOLIC BLOOD PRESSURE: 82 MMHG | DIASTOLIC BLOOD PRESSURE: 64 MMHG | RESPIRATION RATE: 14 BRPM | WEIGHT: 227.8 LBS | HEART RATE: 58 BPM | BODY MASS INDEX: 34.64 KG/M2 | OXYGEN SATURATION: 98 %

## 2021-01-26 DIAGNOSIS — I42.8 NICM (NONISCHEMIC CARDIOMYOPATHY) (H): ICD-10-CM

## 2021-01-26 DIAGNOSIS — E11.8 TYPE 2 DIABETES MELLITUS WITH COMPLICATION, WITH LONG-TERM CURRENT USE OF INSULIN (H): ICD-10-CM

## 2021-01-26 DIAGNOSIS — I47.29 PAROXYSMAL VT (H): ICD-10-CM

## 2021-01-26 DIAGNOSIS — Z79.4 TYPE 2 DIABETES MELLITUS WITH COMPLICATION, WITH LONG-TERM CURRENT USE OF INSULIN (H): ICD-10-CM

## 2021-01-26 DIAGNOSIS — F33.9 MAJOR DEPRESSION, RECURRENT, CHRONIC (H): Primary | ICD-10-CM

## 2021-01-26 DIAGNOSIS — J44.9 CHRONIC OBSTRUCTIVE PULMONARY DISEASE, UNSPECIFIED COPD TYPE (H): ICD-10-CM

## 2021-01-26 DIAGNOSIS — N18.32 STAGE 3B CHRONIC KIDNEY DISEASE (H): ICD-10-CM

## 2021-01-26 DIAGNOSIS — I48.0 PAROXYSMAL ATRIAL FIBRILLATION (H): ICD-10-CM

## 2021-01-26 PROCEDURE — 99214 OFFICE O/P EST MOD 30 MIN: CPT | Performed by: INTERNAL MEDICINE

## 2021-01-26 RX ORDER — HEPARIN SODIUM (PORCINE) LOCK FLUSH IV SOLN 100 UNIT/ML 100 UNIT/ML
5 SOLUTION INTRAVENOUS
Status: CANCELLED | OUTPATIENT
Start: 2021-01-26

## 2021-01-26 RX ORDER — HEPARIN SODIUM,PORCINE 10 UNIT/ML
5 VIAL (ML) INTRAVENOUS
Status: CANCELLED | OUTPATIENT
Start: 2021-01-26

## 2021-01-26 NOTE — PROGRESS NOTES
Pt had iron studies checked at appt last week. Iron levels are low. Reviewed with Dr. Montgomery. MD gave order for pt to receive IV iron replacement, Venofer 300mg IV x3 doses.   Called pt to review plan pt verbalized understanding. Will call infusion clinic at [facility].

## 2021-01-26 NOTE — PROGRESS NOTES
Assessment & Plan     1.  Major depression recurrent chronic currently being treated with Wellbutrin and doing well.  Continue the same.  2.  Type 2 diabetes mellitus with long-term use of insulin and complication.  His A1c was 6.1 on 1/7/2021.  Told him not to use short acting insulin at all at this point and if his sugars are running low cut back Lantus from 6 units to maybe 4 or 5 units.  He has been weaned off prednisone so his sugars have come down.  Also he has lost weight.  He may not require insulin at all.  I will recheck him with A1c in 3 months.  3.  Stage IIIb chronic kidney disease with more recent creatinine having gone up to 2.2 with GFR 30.  4.  Nonischemic cardiomyopathy with EF 20%, status post LVAD, status post ICD.  Awaiting to hear about possible heart transplant.  5.  Atrial and ventricular dysrhythmias in form of paroxysmal atrial fibrillation and V. tach.  Currently anticoagulated with Coumadin.  6.  COPD recently he has pulmonology switched him to Trelegy and is doing okay.  7.  Multiple other health issues as mentioned in the problem list and in the last clinic visit impression.    No follow-ups on file.    Ricardo Gómez MD  [facility] KOLTON Fernandez is a 65 year old who presents to clinic today for the following health issues     HPI     Patient has come in for follow-up regarding change in treatment for major depression disorder.  Since switching him to Wellbutrin from Celexa he has felt more energetic than before.  Seems to control his mood well.  He has not used short acting insulin lately because his sugars have been okay when he is checked before meals.  He is taking Lantus 6 units at night.  He was recently seen by his cardiologist.  He has been considered for transplant.  Patient is complaining of more pain in the right hand related to carpal tunnel.  He had seen Dr. Whelan regarding surgery but due to his heart condition,IVED an ICD it looks  like he may want to have patient go [facility] for surgery rather than [facility].  He has not heard from Dr. Whelan since last visit.    Diabetes Follow-up    How often are you checking your blood sugar? Two times daily  Blood sugar testing frequency justification:  Uncontrolled diabetes  What time of day are you checking your blood sugars (select all that apply)?  Before meals  Have you had any blood sugars above 200?  No  Have you had any blood sugars below 70?  No    What symptoms do you notice when your blood sugar is low?  None    What concerns do you have today about your diabetes? None     Do you have any of these symptoms? (Select all that apply)  Patient has some concerns with numbness in feet     Have you had a diabetic eye exam in the last 12 months? Yes patient is due for yearly         BP Readings from Last 2 Encounters:   01/21/21 (!) 80/0   01/11/21 95/64     Hemoglobin A1C (%)   Date Value   01/07/2021 6.2 (H)   11/05/2020 5.7 (H)     LDL Cholesterol Calculated (mg/dL)   Date Value   11/05/2020 104 (H)   07/06/2018 128 (H)                 How many servings of fruits and vegetables do you eat daily?  2-3    On average, how many sweetened beverages do you drink each day (Examples: soda, juice, sweet tea, etc.  Do NOT count diet or artificially sweetened beverages)?   1    How many days per week do you exercise enough to make your heart beat faster? 3 or less    How many minutes a day do you exercise enough to make your heart beat faster? 9 or less    How many days per week do you miss taking your medication? 0        Review of Systems   Constitutional, HEENT, cardiovascular, pulmonary, GI, , musculoskeletal, neuro, skin, endocrine and psych systems are negative, except as otherwise noted.      Objective    There were no vitals taken for this visit.  There is no height or weight on file to calculate BMI.  Physical Exam   GENERAL: healthy, alert and no distress  NECK: no adenopathy, no asymmetry,  masses, or scars and thyroid normal to palpation  RESP: lungs clear to auscultation - no rales, rhonchi or wheezes  CV:Hum of the IVAD present.  no murmur, click or rub, no peripheral edema  ABDOMEN: soft, nontender, no hepatosplenomegaly, no masses and bowel sounds normal  MS: no gross musculoskeletal defects noted, no edema. Weakness right hand with thenar muscle wasting.   Diabetes check: Touch perception in the feet is intact.

## 2021-01-26 NOTE — TELEPHONE ENCOUNTER
Called patient to follow up. Patient did hear back from pulmonology and they are changing his inhaler to Trelegy. Patient has not started yet because he was using up his 2 other inhalers. Patient also reported that his pantoprazole was discontinued at his last office visit.    [facility]    Angelina Recinos, Pharm.D, BCACP  Medication Therapy Management Pharmacist

## 2021-01-28 ENCOUNTER — TELEPHONE (OUTPATIENT)
Dept: CARE COORDINATION | Facility: CLINIC | Age: 66
End: 2021-01-28

## 2021-01-28 ENCOUNTER — TELEPHONE (OUTPATIENT)
Dept: ENDOCRINOLOGY | Facility: CLINIC | Age: 66
End: 2021-01-28

## 2021-01-28 ENCOUNTER — COMMITTEE REVIEW (OUTPATIENT)
Dept: TRANSPLANT | Facility: CLINIC | Age: 66
End: 2021-01-28

## 2021-01-28 ENCOUNTER — ANTICOAGULATION THERAPY VISIT (OUTPATIENT)
Dept: ANTICOAGULATION | Facility: CLINIC | Age: 66
End: 2021-01-28

## 2021-01-28 ENCOUNTER — TELEPHONE (OUTPATIENT)
Dept: TRANSPLANT | Facility: CLINIC | Age: 66
End: 2021-01-28

## 2021-01-28 DIAGNOSIS — Z79.01 LONG TERM CURRENT USE OF ANTICOAGULANT THERAPY: ICD-10-CM

## 2021-01-28 DIAGNOSIS — I50.22 CHRONIC SYSTOLIC CONGESTIVE HEART FAILURE (H): ICD-10-CM

## 2021-01-28 DIAGNOSIS — Z95.811 LVAD (LEFT VENTRICULAR ASSIST DEVICE) PRESENT (H): ICD-10-CM

## 2021-01-28 DIAGNOSIS — E11.43 TYPE 2 DIABETES MELLITUS WITH DIABETIC AUTONOMIC NEUROPATHY, WITHOUT LONG-TERM CURRENT USE OF INSULIN (H): ICD-10-CM

## 2021-01-28 DIAGNOSIS — I42.9 CARDIOMYOPATHY (H): Primary | ICD-10-CM

## 2021-01-28 LAB — INR PPP: 2.26 (ref 0.86–1.14)

## 2021-01-28 PROCEDURE — 36416 COLLJ CAPILLARY BLOOD SPEC: CPT | Performed by: INTERNAL MEDICINE

## 2021-01-28 PROCEDURE — 85610 PROTHROMBIN TIME: CPT | Performed by: INTERNAL MEDICINE

## 2021-01-28 NOTE — TELEPHONE ENCOUNTER
Pre-Transplant Social Work Services Phone Call      Data: Writer familiar with pt through LVAD f/u. Pt is going to be presented at heart transplant committee meeting tomorrow. In previous conversations with pt he had been uncertain about caregiver plan post-transplant. Pt's wife had provided the caregiver support post-LVAD, but pt and she had been going through a rough time in their relationship last fall (2020) and he wasn't sure she would be willing to provide caregiver support post-transplant.   Spoke to pt initially, when his wife was not home. Pt reports their relationship has significantly improved since wife's sister moved out of the home. Sister had created a lot of tension between pt and wife and now that she is not there he reports they have a much better relationship. Pt had changed his HCD back in the fall to remove his wife has primary decision maker, but now wants to change it back. Offered to send pt a copy of a HCD, but he requests we complete this the next time he is in the hospital. Writer will make note of that.   Pt's wife, Cate, did call writer to discuss caregiver support. Writer reviewed caregiver support expectations and she is agreeable to being pt's caregiver post-transplant.      Intervention: Transplant Caregiver Plan   Assessment: Pt is feeling good about his caregiver plan post-transplant. This past fall he had a lot of uncertainty about his caregiver plan and this was creating some stress. Writer fortunately was able to initially speak with pt w/o his wife present. Pt feels that the relationship with his wife has significantly improved and he would have no concerns with her being his caregiver. In speaking to pt's wife later, wife has no concerns in providing caregiver support. We also did discuss potential caregiver support for the adopted granddaughter while pt is in the hospital and post-transplant and they have someone to provide care to their granddaughter post transplant if  needed.   Pt is eager to proceed with heart transplant. Is having normal feelings of anxiety around potential listing but is happy he is being considered as a candidate to move towards transplant. He will be anxiously awaiting the committees determination on listing.   Education provided by [facility]: Ongoing Social Work support, caregiver support plan post-transplant  Plan:  From a psychosocial perspective writer has no concerns with pt proceeding with heart transplant listing.

## 2021-01-28 NOTE — TELEPHONE ENCOUNTER
[facility] Call Center    Phone Message    May a detailed message be left on voicemail: yes     Reason for Call:     Pt requesting sliding scale sheet to be emailed to him. Original sheet was damage by pt's granddaughter.     Action Taken: Message routed to:  [facility] endo    Travel Screening: Not Applicable

## 2021-01-28 NOTE — TELEPHONE ENCOUNTER
January 28, 2021 1:48 PM -  TOM:   I spoke with Chastity the  at [facility], and she is going to try to get a letter of dental clearance drafted and sent today.

## 2021-01-28 NOTE — PROGRESS NOTES
ANTICOAGULATION FOLLOW-UP CLINIC VISIT    Patient Name:  Jim Willingham  Date:  2021  Contact Type:  Telephone    SUBJECTIVE:  Patient Findings     Comments:  Patient will stop Lovenox.         Clinical Outcomes     Comments:  Patient will stop Lovenox.            OBJECTIVE    Recent labs: (last 7 days)     21  0821   INR 2.26*       ASSESSMENT / PLAN  No question data found.  Anticoagulation Summary  As of 2021    INR goal:  2.0-3.0   TTR:  55.7 % (11 mo)   INR used for dosin.26 (2021)   Warfarin maintenance plan:  7.5 mg (5 mg x 1.5) every Thu; 10 mg (5 mg x 2) all other days   Full warfarin instructions:  : 7.5 mg; Otherwise 7.5 mg every Thu; 10 mg all other days   Weekly warfarin total:  67.5 mg   Plan last modified:  Maru Alonso RN (2021)   Next INR check:  2021   Priority:  Critical   Target end date:  Indefinite    Indications    LVAD (left ventricular assist device) present (H) [Z95.811]  Long-term (current) use of anticoagulants [Z79.01] [Z79.01]  Chronic systolic congestive heart failure (H) [I50.22]             Anticoagulation Episode Summary     INR check location:      Preferred lab:      Send INR reminders to:  [facility]    Comments:  +++Patient drawn at Home care visit Q Mon. (8/10/20)+++  Labs drawn either at [facility] or [facility]  LVAD placed 17   II  ASA 81 mg daily      Anticoagulation Care Providers     Provider Role Specialty Phone number    Delisa Montgomery MD Referring Advanced Heart Failure and Transplant Cardiology [phone]            See the Encounter Report to view Anticoagulation Flowsheet and Dosing Calendar (Go to Encounters tab in chart review, and find the Anticoagulation Therapy Visit)    Spoke with patient.     Maru Alonso, RN

## 2021-01-28 NOTE — PROGRESS NOTES
Chart reviewed with [facility] RN, looking at last 3 days, to get to therapeutic INR, daily dose was ~10.7mg/daily, but daily average previous week ~9.5mg/day.  Recommend 7.5mg today to slow rapid rise & 10mg ROW, needs recheck by Monday.      Nell Darby, PharmD BCACP  Anticoagulation Clinical Pharmacist

## 2021-01-28 NOTE — CONFIDENTIAL NOTE
Pt called requesting new copy of Sliding scale. Sliding scale not available in notes. Sent to provider for sliding scale clarification.   Mariana Mckeon RN on 1/28/2021 at 9:59 AM     PLEASE HELP SCHEDULE THE FOLLOWING APPT(S): Return Appointment    TIME FRAME NEEDED BY: First available.       SCHEDULING COMMENTS (optional): with PA  Per notes

## 2021-02-01 ENCOUNTER — ANTICOAGULATION THERAPY VISIT (OUTPATIENT)
Dept: ANTICOAGULATION | Facility: CLINIC | Age: 66
End: 2021-02-01

## 2021-02-01 ENCOUNTER — TELEPHONE (OUTPATIENT)
Dept: TRANSPLANT | Facility: CLINIC | Age: 66
End: 2021-02-01

## 2021-02-01 DIAGNOSIS — Z95.811 LVAD (LEFT VENTRICULAR ASSIST DEVICE) PRESENT (H): ICD-10-CM

## 2021-02-01 DIAGNOSIS — Z79.899 ON AMIODARONE THERAPY: ICD-10-CM

## 2021-02-01 DIAGNOSIS — I42.9 CARDIOMYOPATHY (H): Primary | ICD-10-CM

## 2021-02-01 DIAGNOSIS — I47.29 PAROXYSMAL VT (H): ICD-10-CM

## 2021-02-01 DIAGNOSIS — I50.22 CHRONIC SYSTOLIC CONGESTIVE HEART FAILURE (H): ICD-10-CM

## 2021-02-01 DIAGNOSIS — M1A.3790 CHRONIC GOUT DUE TO RENAL IMPAIRMENT INVOLVING FOOT WITHOUT TOPHUS, UNSPECIFIED LATERALITY: ICD-10-CM

## 2021-02-01 DIAGNOSIS — Z79.01 LONG TERM CURRENT USE OF ANTICOAGULANT THERAPY: ICD-10-CM

## 2021-02-01 DIAGNOSIS — I50.22 CHRONIC SYSTOLIC CONGESTIVE HEART FAILURE (H): Primary | ICD-10-CM

## 2021-02-01 LAB
ALBUMIN SERPL-MCNC: 3.7 G/DL (ref 3.4–5)
ALP SERPL-CCNC: 105 U/L (ref 40–150)
ALT SERPL W P-5'-P-CCNC: 53 U/L (ref 0–70)
ANION GAP SERPL CALCULATED.3IONS-SCNC: 9 MMOL/L (ref 3–14)
AST SERPL W P-5'-P-CCNC: 36 U/L (ref 0–45)
BILIRUB DIRECT SERPL-MCNC: 0.2 MG/DL (ref 0–0.2)
BILIRUB SERPL-MCNC: 0.6 MG/DL (ref 0.2–1.3)
BUN SERPL-MCNC: 47 MG/DL (ref 7–30)
CALCIUM SERPL-MCNC: 8.1 MG/DL (ref 8.5–10.1)
CHLORIDE SERPL-SCNC: 98 MMOL/L (ref 94–109)
CO2 SERPL-SCNC: 25 MMOL/L (ref 20–32)
CREAT SERPL-MCNC: 1.69 MG/DL (ref 0.66–1.25)
ERYTHROCYTE [DISTWIDTH] IN BLOOD BY AUTOMATED COUNT: 20.8 % (ref 10–15)
GFR SERPL CREATININE-BSD FRML MDRD: 42 ML/MIN/{1.73_M2}
GLUCOSE SERPL-MCNC: 215 MG/DL (ref 70–99)
HCT VFR BLD AUTO: 33.8 % (ref 40–53)
HGB BLD-MCNC: 10.1 G/DL (ref 13.3–17.7)
INR PPP: 1.95 (ref 0.86–1.14)
LDH SERPL L TO P-CCNC: 371 U/L (ref 85–227)
MCH RBC QN AUTO: 26.5 PG (ref 26.5–33)
MCHC RBC AUTO-ENTMCNC: 29.9 G/DL (ref 31.5–36.5)
MCV RBC AUTO: 89 FL (ref 78–100)
PLATELET # BLD AUTO: 236 10E9/L (ref 150–450)
POTASSIUM SERPL-SCNC: 4.7 MMOL/L (ref 3.4–5.3)
PROT SERPL-MCNC: 7 G/DL (ref 6.8–8.8)
RBC # BLD AUTO: 3.81 10E12/L (ref 4.4–5.9)
SODIUM SERPL-SCNC: 132 MMOL/L (ref 133–144)
TSH SERPL DL<=0.005 MIU/L-ACNC: 3.52 MU/L (ref 0.4–4)
URATE SERPL-MCNC: 3.9 MG/DL (ref 3.5–7.2)
WBC # BLD AUTO: 7 10E9/L (ref 4–11)

## 2021-02-01 PROCEDURE — 82248 BILIRUBIN DIRECT: CPT | Performed by: INTERNAL MEDICINE

## 2021-02-01 PROCEDURE — 36415 COLL VENOUS BLD VENIPUNCTURE: CPT | Performed by: INTERNAL MEDICINE

## 2021-02-01 PROCEDURE — 83615 LACTATE (LD) (LDH) ENZYME: CPT | Performed by: INTERNAL MEDICINE

## 2021-02-01 PROCEDURE — 84550 ASSAY OF BLOOD/URIC ACID: CPT | Performed by: INTERNAL MEDICINE

## 2021-02-01 PROCEDURE — 85610 PROTHROMBIN TIME: CPT | Performed by: INTERNAL MEDICINE

## 2021-02-01 PROCEDURE — 85027 COMPLETE CBC AUTOMATED: CPT | Performed by: INTERNAL MEDICINE

## 2021-02-01 PROCEDURE — 84443 ASSAY THYROID STIM HORMONE: CPT | Performed by: INTERNAL MEDICINE

## 2021-02-01 PROCEDURE — 80053 COMPREHEN METABOLIC PANEL: CPT | Performed by: INTERNAL MEDICINE

## 2021-02-01 ASSESSMENT — ENCOUNTER SYMPTOMS
DIZZINESS: 1
ARTHRALGIAS: 1
DYSPNEA ON EXERTION: 1
HYPOTENSION: 0
SPUTUM PRODUCTION: 0
DOUBLE VISION: 0
MUSCLE WEAKNESS: 1
COUGH: 0
EYE IRRITATION: 0
ORTHOPNEA: 0
EYE WATERING: 0
NECK PAIN: 0
SHORTNESS OF BREATH: 1
EXERCISE INTOLERANCE: 1
LIGHT-HEADEDNESS: 0
JOINT SWELLING: 1
TINGLING: 1
WEAKNESS: 1
MEMORY LOSS: 0
SLEEP DISTURBANCES DUE TO BREATHING: 0
TREMORS: 0
LEG PAIN: 1
PARALYSIS: 0
MUSCLE CRAMPS: 0
EYE REDNESS: 0
PALPITATIONS: 0
STIFFNESS: 1
SNORES LOUDLY: 0
WHEEZING: 0
NUMBNESS: 1
SEIZURES: 0
MYALGIAS: 1
EYE PAIN: 0
SYNCOPE: 0
DISTURBANCES IN COORDINATION: 0
HEADACHES: 0
COUGH DISTURBING SLEEP: 0
POSTURAL DYSPNEA: 0
LOSS OF CONSCIOUSNESS: 0
HYPERTENSION: 0
BACK PAIN: 1
SPEECH CHANGE: 0
HEMOPTYSIS: 0

## 2021-02-01 NOTE — PROGRESS NOTES
ANTICOAGULATION FOLLOW-UP CLINIC VISIT    Patient Name:  Jim Willingham  Date:  2021  Contact Type:  Telephone    SUBJECTIVE:  Patient Findings     Comments:  Patient has an appointment on  and will recheck an INR then.  See calender for reported warfarin dosing.         Clinical Outcomes     Comments:  Patient has an appointment on  and will recheck an INR then.  See calender for reported warfarin dosing.            OBJECTIVE    Recent labs: (last 7 days)     21  1127   INR 1.95*       ASSESSMENT / PLAN  No question data found.  Anticoagulation Summary  As of 2021    INR goal:  2.0-3.0   TTR:  55.7 % (11.1 mo)   INR used for dosin.95 (2021)   Warfarin maintenance plan:  7.5 mg (5 mg x 1.5) every Thu; 10 mg (5 mg x 2) all other days   Full warfarin instructions:  7.5 mg every Thu; 10 mg all other days   Weekly warfarin total:  67.5 mg   No change documented:  Maru Alonso RN   Plan last modified:  Maru Alonso RN (2021)   Next INR check:  2021   Priority:  Critical   Target end date:  Indefinite    Indications    LVAD (left ventricular assist device) present (H) [Z95.811]  Long-term (current) use of anticoagulants [Z79.01] [Z79.01]  Chronic systolic congestive heart failure (H) [I50.22]             Anticoagulation Episode Summary     INR check location:      Preferred lab:      Send INR reminders to:  [facility]    Comments:  +++Patient drawn at Home care visit Q Mon. (8/10/20)+++  Labs drawn either at [facility] or [facility]  LVAD placed 17   II  ASA 81 mg daily      Anticoagulation Care Providers     Provider Role Specialty Phone number    Delisa Montgomery MD Referring Advanced Heart Failure and Transplant Cardiology [phone]            See the Encounter Report to view Anticoagulation Flowsheet and Dosing Calendar (Go to Encounters tab in chart review, and find the Anticoagulation Therapy Visit)    Spoke with patient.    Maru AREVALO  REGINALD Alonso

## 2021-02-01 NOTE — TELEPHONE ENCOUNTER
Hi all,    I didn't want to call patient to schedule visit until I knew the status of his sliding scale, in case he asks about it.    Thanks,  Margarette

## 2021-02-02 ENCOUNTER — CARE COORDINATION (OUTPATIENT)
Dept: CARDIOLOGY | Facility: CLINIC | Age: 66
End: 2021-02-02

## 2021-02-02 DIAGNOSIS — I50.22 CHRONIC SYSTOLIC CONGESTIVE HEART FAILURE (H): ICD-10-CM

## 2021-02-02 RX ORDER — BUMETANIDE 2 MG/1
5 TABLET ORAL 2 TIMES DAILY
Qty: 540 TABLET | Refills: 3 | COMMUNITY
Start: 2021-02-02 | End: 2021-02-05

## 2021-02-02 NOTE — PROGRESS NOTES
Pt called VAD Coordinator back. After discussing options with wife, would like to keep appt with Dr. Montgomery on 2/5 and will decide after that about timing for admission. MD updated.

## 2021-02-02 NOTE — PROGRESS NOTES
Called pt yesterday after pt reported to transplant coordinator that he has gained some weight. Weight yesterday (2/1) was 220lb. On 1/30 pt's weight was 216lb. His weight on discharge from the hospital 3 weeks ago was 213.6lb. Pt reports his normal amount of SOB, no LE edema, some abdominal distension. Currently on bumex 4BID, kcl 60 daily. Pt had INR drawn yesterday, and his lab ricardo the orders that were intended for his clinic visit on 2/5. Creat is improved at 1.69, from 2.22 when he was last in clinic on 1/21.   Reviewed findings and options with Dr. Montgomery. MD gave instructions to leave diuretics as is if weight maintains at 220lb, and to increase Bumex to 5BID if weight increased further. No change in KCL needed. Also discussed hospital admission for tune-up and heart transplant eval completion and possible listing if pt is amenable.   Called pt today to review options. Pt reports he caught a cold from his granddaughter and isn't feeling well today. His weight has increased to 221lb. Reviewed options for hospital admission. Pt wants to discuss this with his wife and take some time to think about options. He will call back later today or tomorrow. For the time being, he will increase bumex to 5mg BID.

## 2021-02-04 NOTE — TELEPHONE ENCOUNTER
Transplant Update 02/01/21:  Topic:  Transplant Committee Review    Called and discussed the committee review discussion from the Advanced Heart Failure team.  Patient's case was discussed (see committee review note for further info) and follow up testing is needed for his chest CT and 6MW.  Patient is hesitant to perform the 6MW as he is feeling weak and is anxious as to how that will affect his transplant candidacy.  Answered patient's questions and supported his with his concerns.  Made a plan to review his evaluation and perform these tests at his clinic visit this coming Friday.  Will plan to coordinate those tests with his VAD coordinator and meet with the patient this week.  Patient verbalized understanding of the plan and agrees to call Transplant Coordinator with any further questions or concerns.

## 2021-02-04 NOTE — TELEPHONE ENCOUNTER
Spoke to patient. He will most likely be admitted to hospital on Monday 2/8, so I advised him that our inpatient team can consult with him then. I was going to offer 2/8 appt with Felicita but since he'll be inpatient for a few months (heart tx) I'm assuming our team will just meet with him inpatient.    Thanks,  Margarette

## 2021-02-05 ENCOUNTER — ANTICOAGULATION THERAPY VISIT (OUTPATIENT)
Dept: ANTICOAGULATION | Facility: CLINIC | Age: 66
End: 2021-02-05

## 2021-02-05 ENCOUNTER — ANCILLARY PROCEDURE (OUTPATIENT)
Dept: CT IMAGING | Facility: CLINIC | Age: 66
End: 2021-02-05
Attending: INTERNAL MEDICINE
Payer: COMMERCIAL

## 2021-02-05 ENCOUNTER — OFFICE VISIT (OUTPATIENT)
Dept: CARDIOLOGY | Facility: CLINIC | Age: 66
End: 2021-02-05
Attending: INTERNAL MEDICINE
Payer: COMMERCIAL

## 2021-02-05 VITALS
BODY MASS INDEX: 32.84 KG/M2 | HEIGHT: 68 IN | OXYGEN SATURATION: 97 % | WEIGHT: 216.7 LBS | TEMPERATURE: 98.3 F | HEART RATE: 74 BPM | SYSTOLIC BLOOD PRESSURE: 70 MMHG

## 2021-02-05 DIAGNOSIS — I42.9 CARDIOMYOPATHY (H): ICD-10-CM

## 2021-02-05 DIAGNOSIS — Z79.01 LONG TERM CURRENT USE OF ANTICOAGULANT THERAPY: ICD-10-CM

## 2021-02-05 DIAGNOSIS — I50.22 CHRONIC SYSTOLIC CONGESTIVE HEART FAILURE (H): ICD-10-CM

## 2021-02-05 DIAGNOSIS — Z95.811 LVAD (LEFT VENTRICULAR ASSIST DEVICE) PRESENT (H): ICD-10-CM

## 2021-02-05 LAB
6 MIN WALK (FT): 600 FT
6 MIN WALK (M): 183 M
ANION GAP SERPL CALCULATED.3IONS-SCNC: 9 MMOL/L (ref 3–14)
BUN SERPL-MCNC: 47 MG/DL (ref 7–30)
CALCIUM SERPL-MCNC: 8.4 MG/DL (ref 8.5–10.1)
CHLORIDE SERPL-SCNC: 100 MMOL/L (ref 94–109)
CO2 SERPL-SCNC: 28 MMOL/L (ref 20–32)
CREAT SERPL-MCNC: 2.02 MG/DL (ref 0.66–1.25)
GFR SERPL CREATININE-BSD FRML MDRD: 34 ML/MIN/{1.73_M2}
GLUCOSE SERPL-MCNC: 161 MG/DL (ref 70–99)
INR PPP: 2.67 (ref 0.86–1.14)
LDH SERPL L TO P-CCNC: 332 U/L (ref 85–227)
POTASSIUM SERPL-SCNC: 4.2 MMOL/L (ref 3.4–5.3)
SODIUM SERPL-SCNC: 137 MMOL/L (ref 133–144)

## 2021-02-05 PROCEDURE — 36415 COLL VENOUS BLD VENIPUNCTURE: CPT | Performed by: PATHOLOGY

## 2021-02-05 PROCEDURE — 93750 INTERROGATION VAD IN PERSON: CPT | Performed by: INTERNAL MEDICINE

## 2021-02-05 PROCEDURE — G0463 HOSPITAL OUTPT CLINIC VISIT: HCPCS | Mod: 25

## 2021-02-05 PROCEDURE — 85610 PROTHROMBIN TIME: CPT | Performed by: PATHOLOGY

## 2021-02-05 PROCEDURE — 83615 LACTATE (LD) (LDH) ENZYME: CPT | Performed by: PATHOLOGY

## 2021-02-05 PROCEDURE — 99215 OFFICE O/P EST HI 40 MIN: CPT | Mod: 25 | Performed by: INTERNAL MEDICINE

## 2021-02-05 PROCEDURE — 80048 BASIC METABOLIC PNL TOTAL CA: CPT | Performed by: PATHOLOGY

## 2021-02-05 PROCEDURE — 71250 CT THORAX DX C-: CPT | Mod: GC | Performed by: RADIOLOGY

## 2021-02-05 PROCEDURE — 94618 PULMONARY STRESS TESTING: CPT | Performed by: INTERNAL MEDICINE

## 2021-02-05 RX ORDER — BUMETANIDE 2 MG/1
4 TABLET ORAL 2 TIMES DAILY
Qty: 540 TABLET | Refills: 3 | Status: ON HOLD | OUTPATIENT
Start: 2021-02-05 | End: 2021-05-31

## 2021-02-05 RX ORDER — CEFDINIR 300 MG/1
300 CAPSULE ORAL 2 TIMES DAILY
Qty: 20 CAPSULE | Refills: 0 | Status: ON HOLD | OUTPATIENT
Start: 2021-02-05 | End: 2021-05-31

## 2021-02-05 ASSESSMENT — PAIN SCALES - GENERAL: PAINLEVEL: NO PAIN (0)

## 2021-02-05 ASSESSMENT — MIFFLIN-ST. JEOR: SCORE: 1752.44

## 2021-02-05 NOTE — PROGRESS NOTES
February 5, 2021    HPI:   65 year old male with a past medical history of NICM (EF 20%) s/p HM II LVAD placement (6/19/17) at DT (obesity)     Staph hominis bacteremia and completed 6 weeks of IV vancomycin he has had no recurrence of symptoms.     The last several months of been very difficult with recurrent ventricular arrhythmias, refractory fluid overload.  He was just admitted to the hospital where it was found that his pulmonary capillary wedge pressure was elevated.     Number of medication changes were made include increased speed as well as increased afterload reduction.     His weight has been relatively stable from the time of discharge although he feels pretty fatigued overall and less strong than he did immediately after the LVAD.     His weight is finally in the range that we could consider cardiac transplant and pulmonary function tests were repeated today showing improvement in his lung function.     He presently denies PND orthopnea.     Other recent issues:      atrial fibrillation starting earlier this year.  He did see electrophysiology as the pacemaker settings were changed and there was some discussion about whether or not he should have a cardioversion or just leave him in atrial fibrillation.  He was eventually cardioverted and is on amiodarone.  He did have symptomatic improvement with this.     He denies fevers, chills, driveline redness, tenderness, or discharge. No LVAD alarms or blood in the urine or stools.    We just presented his case to the transplant committee last week-he is approved pending completion of 6-minute walk distance-repeat chest CT-and also his renal function needs to stay in this range or better to maintain candidacy.     Currently on Bumex 5 twice daily weight has come down on that over the last 2 days.     PAST MEDICAL HISTORY:  Past Medical History:   Diagnosis Date     Benign essential hypertension 5/11/2017     Bilateral carpal tunnel syndrome 11/2/2020      Cardiomyopathy, unspecified (H) 5/8/2017     CKD (chronic kidney disease) stage 3, GFR 30-59 ml/min 5/11/2017     COPD (chronic obstructive pulmonary disease) (H) 11/2/2020     Depression 5/11/2017     Diabetes mellitus (H) 5/11/2017     Gouty arthropathy, chronic, without tophi 11/2/2020     H/O gastric bypass 5/11/2017     ICD (implantable cardioverter-defibrillator), biventricular, in situ 5/11/2017     LVAD (left ventricular assist device) present (H)      Major depression, recurrent, chronic (H) 11/2/2020     NICM (nonischemic cardiomyopathy) (H)/ EF 20% 5/11/2017    ECHO: LVEDd. 7.66 cm, Restrictive pattern , Severe mitral valve regurgitation     CECILIA (obstructive sleep apnea) 5/11/2017     Paroxysmal atrial fibrillation (H) 5/11/2017     Paroxysmal VT (H) 5/11/2017     Rotator cuff tear arthropathy of both shoulders 11/2/2020     Uncomplicated asthma      Vitamin B12 deficiency (non anemic) 5/11/2017       FAMILY HISTORY:  Family History   Problem Relation Age of Onset     Cerebrovascular Disease Mother 64     Diabetes Mother      Hypertension Mother      Coronary Artery Disease Father      Diabetes Type 2  Father      Obesity Brother      Obesity Brother      Cerebrovascular Disease Daughter 40     SOCIAL HISTORY:  No changes     CURRENT MEDICATIONS:\      Current Outpatient Medications   Medication Sig Dispense Refill     acetaminophen (TYLENOL) 325 MG tablet Take 650 mg by mouth every 6 hours as needed for mild pain       albuterol (PROAIR HFA) 108 (90 Base) MCG/ACT inhaler Inhale 2 puffs into the lungs every 4 hours as needed for shortness of breath / dyspnea or wheezing        allopurinol (ZYLOPRIM) 300 MG tablet Take 1 tablet (300 mg) by mouth daily 90 tablet 1     amiodarone (PACERONE) 200 MG tablet Take 400 mg by mouth daily       aspirin 81 MG chewable tablet 1 tablet (81 mg) by Oral or Feeding Tube route daily 36 tablet      blood glucose monitoring (ONE TOUCH ULTRA 2) meter device kit Use to test  blood sugar four times daily or as directed. 1 kit 0     blood glucose VI test strip Use to test blood sugar four times daily or as directed. 200 each 0     bumetanide (BUMEX) 2 MG tablet Take 3 tablets (6 mg) by mouth 2 times daily 540 tablet 3     buPROPion (WELLBUTRIN XL) 150 MG 24 hr tablet Take 1 tablet (150 mg) by mouth every morning 60 tablet 1     Fluticasone-Umeclidin-Vilanterol (TRELEGY ELLIPTA) 100-62.5-25 MCG/INH oral inhaler Inhale 1 puff into the lungs daily 1 each 0     gabapentin (NEURONTIN) 300 MG capsule 300 mg 3 times daily 300mg twice daily + 600mg at bedtime        hydrALAZINE (APRESOLINE) 25 MG tablet Take 3 tablets (75 mg) by mouth 3 times daily 150 tablet 0     insulin glargine (LANTUS SOLOSTAR) 100 UNIT/ML pen Inject 6 Units Subcutaneous At Bedtime 9 mL 3     insulin lispro (HUMALOG KWIKPEN) 100 UNIT/ML (1 unit dial) KWIKPEN Inject 1-7 Units Subcutaneous 4 times daily 3 mL 1     insulin pen needle (32G X 4 MM) 32G X 4 MM miscellaneous Use four pen needles daily or as directed. 110 each 0     isosorbide dinitrate (ISORDIL) 10 MG tablet Take 1 tablet (10 mg) by mouth 3 times daily 90 tablet 0     metolazone (ZAROXOLYN) 2.5 MG tablet Take 1 tablet (2.5 mg) by mouth as needed (take ONLY as directed by VAD team) 5 tablet 0     montelukast (SINGULAIR) 10 MG tablet Take 10 mg by mouth At Bedtime        potassium chloride ER (KLOR-CON M) 20 MEQ CR tablet Take 3 tablets (60 mEq) by mouth daily 90 tablet 0     predniSONE (DELTASONE) 5 MG tablet Take 1 tablet (5 mg) by mouth daily 30 tablet 2     warfarin ANTICOAGULANT (COUMADIN) 2.5 MG tablet Take 7.5 mg daily except Tuesdays then take 10 mg on that day or as directed by the anticoagulation clinic 50 tablet 0     zinc sulfate (ZINCATE) 220 (50 Zn) MG capsule Take 220 mg by mouth 2 times daily              cyanocobalamin (VITAMIN B12) 1000 MCG/ML injection Inject 1,000 mcg into the muscle every 30 days       enoxaparin ANTICOAGULANT (LOVENOX) 60  "MG/0.6ML syringe Inject 0.6 mLs (60 mg) Subcutaneous every 12 hours (Patient not taking: Reported on 2/5/2021) 10 mL 1                Review of systems: 12 point review of systems negative unless noted in the HPI- other than new sputum production in the last few days     Of note he has not had substantial wheezing recently      EXAM:  BP (!) 70/0 (BP Location: Right arm, Patient Position: Chair, Cuff Size: Adult Regular)   Pulse 74   Temp 98.3  F (36.8  C)   Ht 1.727 m (5' 8\")   Wt 98.3 kg (216 lb 11.2 oz)   SpO2 97%   BMI 32.95 kg/m    General: appears comfortable,  Head: normocephalic, atraumatic  Eyes: anicteric sclera, EOMI  Neck: no adenopathy JVP 8  cm   Orophyarynx: moist mucosa, no lesions, dentition intact  Heart:LVAD hum, PMI diffuse   Lungs: clear, no rales or wheezing  Abdomen: soft, non-tender, bowel sounds present  Extremities: no clubbing, cyanosis no LE edema   Neurological: normal speech and affect, no gross motor deficits       Interpretation Summary  HM2 at 9400RPM.  Severely (EF <30%) reduced left ventricular function is present. Mild left  ventricular dilation is present. LVIDd 61mm.  Septum midline.  Aortic valve remain closed with no AI. Normal inflow velocities (62 cm/s).  Outflow velocity cannot be assessed.  Global right ventricular function is mildly to moderately reduced.  Dilation of the inferior vena cava is present with abnormal respiratory  variation in diameter suggests a high RA pressure estimated at 15 mmHg or  greater.  Mild to moderate mitral insufficiency is present.     This study was compared with the study from 8/5/2020 .  IVC is dilated now with higher RA pressure. Otherwise no significant change      LVAD interrogation: Agree with coordinator note.  No LVAD alarms, no speed drops     Assessment and Plan:    In summary Jim Willingham has a history of NICM (EF 20%) s/p HM II LVAD as DT (initially DT due to obesity).      Overall Jim has been struggling for the last 3 " to 4 months.  We have had persistent volume overload, last right heart catheterization showed very elevated wedge pressure therefore he is now at a higher speed.  He has had recurrent atrial and ventricular arrhythmias.  Worsening kidney function as well as bacteremia.    All of this is taken its toll.     I am really happy to see that his pulmonary function tests show improvement in his FEV1 -   This is the highest it has been since LVAD implantation-he is also had a relatively uneventful last year with respect to needing bronchodilators, steroids or antibiotics.     He is now approved for cardiac transplant pending repeat 6-minute walk distance (to make sure he does not desat) as well as repeat of the chest CT.     He is now mentally in the place that he is ready to come into the hospital.     I am dropping his Bumex to 4 twice daily orally also prescribing an early antibiotic for sputum production as this is what we usually do for him.       I am proposing that he come in early next week and be made status 2 E (for multiple LVAD complications)       I have also talked with him about the fact that when he is in the hospital I need him walking, working with physical therapy multiple laps per day as well as doing them NuStep.     I need him in the best shape possible going into transplant.     Should he deteriorate from here (i.e. worsening renal function or worsening functional class he would not be a candidate at that point-I have been clear with him about this)       Chronic systolic heart failure secondary to NICM.    Stage D    NYHA Class III  ACEi/ARB: Presently not on ACE inhibitor due to rising creatinine  Other afterload reduction: Presently on hydralazine 50- 3 times a day, also on Imdur 30  BB: on hold given decompensated heart failure and low output by last right heart catheterization  Aldosterone antagonist:  note given concerns of low GFR  SCD prophylaxis: CRT-D  Fluid status: Slightly volume down  decreasing diuretics as above      Staph hominis bacteremia: cleared-now on oral suppression    CKD Stage III:-Creatinine improved in the hospital with diuretic management and can increase to be          History of ventricular tachycardia-recent recurrence.  I am hoping that control of his fluid will help keep his rhythm stable.  I would like to drop his amiodarone soon if I can given risk for pulmonary function although we have had both atrial and ventricular arrhythmias- in the hospital please drop to 200 mg daily       paroxysmal AF: in sinus presently -on amiodarone 400 mg a day-again looking to lower this if we have some stability -could likely drop to 200 in the hospital (as above)     COPD: per pulmonary they have seen him and feel he can make it through surgery although I am worried about the need for long-term oxygen later on    History of iron deficiency; will resend Fe studies- this could be replaced in the hospital as well     Plan to bring into the hospital Monday with status 2 exception listing.  I will work on statement this weekend      Delisa Montgomery MD   of Medicine   [facility] Division of Cardiology

## 2021-02-05 NOTE — NURSING NOTE
1). PUMP DATA  Primary controller serial number: [id number]     II:   Flow: 7.2 L/min,    Speed: 9600 RPMs,     PI: 4.4 ,  Power: 7.1 Manuel,      Primary controller   Back up battery: Patient use: 59, Replace in: 13  Months     Data downloaded: No   Equipment and driveline assessed for damage: Yes     Back up : Serial number: [id number]  Back up battery: Patient use: 4 Replace in: 16  Months  Programmed settings identical to the settings on the primary controller :Yes      Education complete: Yes   Charge the BACKUP controller s backup battery every 6 months  Perform a self test on BACKUP every 6 months  Change the MPU s batteries every 6 months:Yes  Have equipment serviced yearly (if applicable):Yes    2). ALARMS  Alarms reported by patient since last pump evaluation: Yes - pt reports accidentally unplugging of the MPU while connected to controller.   Alarms or other finding noted during pump data history and alarm download: Yes - several PI events, with PI ranging 2.2-6.2. occasional speed drops. Hx back 3 days.    Action Taken:  Reviewed data with patient: Yes      3). DRESSING CHANGE / DRIVELINE ASSESSMENT  Dressing change completed today: No  Appearance of Driveline site: c/d/i per pt report    Driveline stabilization: Method: [employer]  [ Teaching reinforced on need for stabilization of Driveline. ]

## 2021-02-05 NOTE — NURSING NOTE
Chief Complaint   Patient presents with     Follow Up     in person LVAD labs prior      Vitals were taken and medications were reconciled.     Silvia López RMA  9:21 AM

## 2021-02-05 NOTE — PROGRESS NOTES
ANTICOAGULATION FOLLOW-UP CLINIC VISIT    Patient Name:  Jim Willingham  Date:  2021  Contact Type:  Telephone    SUBJECTIVE:  Patient Findings     Comments:  Patient will be admitted to the hospital on Monday.        Clinical Outcomes     Comments:  Patient will be admitted to the hospital on Monday.           OBJECTIVE    Recent labs: (last 7 days)     21  0755   INR 2.67*       ASSESSMENT / PLAN  No question data found.  Anticoagulation Summary  As of 2021    INR goal:  2.0-3.0   TTR:  55.6 % (11.1 mo)   INR used for dosin.67 (2021)   Warfarin maintenance plan:  7.5 mg (5 mg x 1.5) every Mon, Wed, Fri; 10 mg (5 mg x 2) all other days   Full warfarin instructions:  7.5 mg every Mon, Wed, Fri; 10 mg all other days   Weekly warfarin total:  62.5 mg   Plan last modified:  Maru Alonso RN (2021)   Next INR check:  2021   Priority:  Critical   Target end date:  Indefinite    Indications    LVAD (left ventricular assist device) present (H) [Z95.811]  Long-term (current) use of anticoagulants [Z79.01] [Z79.01]  Chronic systolic congestive heart failure (H) [I50.22]             Anticoagulation Episode Summary     INR check location:      Preferred lab:      Send INR reminders to:  [facility]    Comments:  +++Patient drawn at Home care visit Q Mon. (8/10/20)+++  Labs drawn either at [facility] or [facility]  LVAD placed 17   II  ASA 81 mg daily      Anticoagulation Care Providers     Provider Role Specialty Phone number    Delisa Montgomery MD Referring Advanced Heart Failure and Transplant Cardiology [phone]            See the Encounter Report to view Anticoagulation Flowsheet and Dosing Calendar (Go to Encounters tab in chart review, and find the Anticoagulation Therapy Visit)    Spoke with patient.     Maru Alonso RN

## 2021-02-05 NOTE — PATIENT INSTRUCTIONS
Medications:  1. DECREASE Bumex to 4mg twice daily. We expect that your weight will go up with this. Your kidney function suggests that your better weight is around 220lb.  2. You can start Cefdinir 300mg twice daily for 10 days for possible early bronchitis - prescription was sent to your pharmacy.    Instructions:  1. We will admit you to the hospital on Monday for transplant listing.   2. Cancel your iron infusion appt's. I will send a note to the team asking them to do these while you are in the hospital.     Follow-up: (make these appointments before you leave)  1. None at this time.     Page the VAD Coordinator on call if you gain more than 3 lb in a day or 5 in a week. Please also page if you feel unwell or have alarms.     Great to see you in clinic today. To Page the VAD Coordinator on call, dial [phone] option #4 and ask to speak to the VAD coordinator on call.

## 2021-02-05 NOTE — LETTER
2/5/2021      RE: Jim Willingham  [address] [id number]       Dear Colleague,    Thank you for the opportunity to participate in the care of your patient, Jim Willingham, at the [facility] [place] [facility]. Please see a copy of my visit note below.      February 5, 2021    HPI:   65 year old male with a past medical history of NICM (EF 20%) s/p HM II LVAD placement (6/19/17) at DT (obesity)     Staph hominis bacteremia and completed 6 weeks of IV vancomycin he has had no recurrence of symptoms.     The last several months of been very difficult with recurrent ventricular arrhythmias, refractory fluid overload.  He was just admitted to the hospital where it was found that his pulmonary capillary wedge pressure was elevated.     Number of medication changes were made include increased speed as well as increased afterload reduction.     His weight has been relatively stable from the time of discharge although he feels pretty fatigued overall and less strong than he did immediately after the LVAD.     His weight is finally in the range that we could consider cardiac transplant and pulmonary function tests were repeated today showing improvement in his lung function.     He presently denies PND orthopnea.     Other recent issues:      atrial fibrillation starting earlier this year.  He did see electrophysiology as the pacemaker settings were changed and there was some discussion about whether or not he should have a cardioversion or just leave him in atrial fibrillation.  He was eventually cardioverted and is on amiodarone.  He did have symptomatic improvement with this.     He denies fevers, chills, driveline redness, tenderness, or discharge. No LVAD alarms or blood in the urine or stools.    We just presented his case to the transplant committee last week-he is approved pending completion of 6-minute walk distance-repeat chest  CT-and also his renal function needs to stay in this range or better to maintain candidacy.     Currently on Bumex 5 twice daily weight has come down on that over the last 2 days.     PAST MEDICAL HISTORY:  Past Medical History:   Diagnosis Date     Benign essential hypertension 5/11/2017     Bilateral carpal tunnel syndrome 11/2/2020     Cardiomyopathy, unspecified (H) 5/8/2017     CKD (chronic kidney disease) stage 3, GFR 30-59 ml/min 5/11/2017     COPD (chronic obstructive pulmonary disease) (H) 11/2/2020     Depression 5/11/2017     Diabetes mellitus (H) 5/11/2017     Gouty arthropathy, chronic, without tophi 11/2/2020     H/O gastric bypass 5/11/2017     ICD (implantable cardioverter-defibrillator), biventricular, in situ 5/11/2017     LVAD (left ventricular assist device) present (H)      Major depression, recurrent, chronic (H) 11/2/2020     NICM (nonischemic cardiomyopathy) (H)/ EF 20% 5/11/2017    ECHO: LVEDd. 7.66 cm, Restrictive pattern , Severe mitral valve regurgitation     CECILIA (obstructive sleep apnea) 5/11/2017     Paroxysmal atrial fibrillation (H) 5/11/2017     Paroxysmal VT (H) 5/11/2017     Rotator cuff tear arthropathy of both shoulders 11/2/2020     Uncomplicated asthma      Vitamin B12 deficiency (non anemic) 5/11/2017       FAMILY HISTORY:  Family History   Problem Relation Age of Onset     Cerebrovascular Disease Mother 64     Diabetes Mother      Hypertension Mother      Coronary Artery Disease Father      Diabetes Type 2  Father      Obesity Brother      Obesity Brother      Cerebrovascular Disease Daughter 40     SOCIAL HISTORY:  No changes     CURRENT MEDICATIONS:\      Current Outpatient Medications   Medication Sig Dispense Refill     acetaminophen (TYLENOL) 325 MG tablet Take 650 mg by mouth every 6 hours as needed for mild pain       albuterol (PROAIR HFA) 108 (90 Base) MCG/ACT inhaler Inhale 2 puffs into the lungs every 4 hours as needed for shortness of breath / dyspnea or wheezing         allopurinol (ZYLOPRIM) 300 MG tablet Take 1 tablet (300 mg) by mouth daily 90 tablet 1     amiodarone (PACERONE) 200 MG tablet Take 400 mg by mouth daily       aspirin 81 MG chewable tablet 1 tablet (81 mg) by Oral or Feeding Tube route daily 36 tablet      blood glucose monitoring (ONE TOUCH ULTRA 2) meter device kit Use to test blood sugar four times daily or as directed. 1 kit 0     blood glucose VI test strip Use to test blood sugar four times daily or as directed. 200 each 0     bumetanide (BUMEX) 2 MG tablet Take 3 tablets (6 mg) by mouth 2 times daily 540 tablet 3     buPROPion (WELLBUTRIN XL) 150 MG 24 hr tablet Take 1 tablet (150 mg) by mouth every morning 60 tablet 1     Fluticasone-Umeclidin-Vilanterol (TRELEGY ELLIPTA) 100-62.5-25 MCG/INH oral inhaler Inhale 1 puff into the lungs daily 1 each 0     gabapentin (NEURONTIN) 300 MG capsule 300 mg 3 times daily 300mg twice daily + 600mg at bedtime        hydrALAZINE (APRESOLINE) 25 MG tablet Take 3 tablets (75 mg) by mouth 3 times daily 150 tablet 0     insulin glargine (LANTUS SOLOSTAR) 100 UNIT/ML pen Inject 6 Units Subcutaneous At Bedtime 9 mL 3     insulin lispro (HUMALOG KWIKPEN) 100 UNIT/ML (1 unit dial) KWIKPEN Inject 1-7 Units Subcutaneous 4 times daily 3 mL 1     insulin pen needle (32G X 4 MM) 32G X 4 MM miscellaneous Use four pen needles daily or as directed. 110 each 0     isosorbide dinitrate (ISORDIL) 10 MG tablet Take 1 tablet (10 mg) by mouth 3 times daily 90 tablet 0     metolazone (ZAROXOLYN) 2.5 MG tablet Take 1 tablet (2.5 mg) by mouth as needed (take ONLY as directed by VAD team) 5 tablet 0     montelukast (SINGULAIR) 10 MG tablet Take 10 mg by mouth At Bedtime        potassium chloride ER (KLOR-CON M) 20 MEQ CR tablet Take 3 tablets (60 mEq) by mouth daily 90 tablet 0     predniSONE (DELTASONE) 5 MG tablet Take 1 tablet (5 mg) by mouth daily 30 tablet 2     warfarin ANTICOAGULANT (COUMADIN) 2.5 MG tablet Take 7.5 mg daily except  "Tuesdays then take 10 mg on that day or as directed by the anticoagulation clinic 50 tablet 0     zinc sulfate (ZINCATE) 220 (50 Zn) MG capsule Take 220 mg by mouth 2 times daily              cyanocobalamin (VITAMIN B12) 1000 MCG/ML injection Inject 1,000 mcg into the muscle every 30 days       enoxaparin ANTICOAGULANT (LOVENOX) 60 MG/0.6ML syringe Inject 0.6 mLs (60 mg) Subcutaneous every 12 hours (Patient not taking: Reported on 2/5/2021) 10 mL 1                Review of systems: 12 point review of systems negative unless noted in the HPI- other than new sputum production in the last few days     Of note he has not had substantial wheezing recently      EXAM:  BP (!) 70/0 (BP Location: Right arm, Patient Position: Chair, Cuff Size: Adult Regular)   Pulse 74   Temp 98.3  F (36.8  C)   Ht 1.727 m (5' 8\")   Wt 98.3 kg (216 lb 11.2 oz)   SpO2 97%   BMI 32.95 kg/m    General: appears comfortable,  Head: normocephalic, atraumatic  Eyes: anicteric sclera, EOMI  Neck: no adenopathy JVP 8  cm   Orophyarynx: moist mucosa, no lesions, dentition intact  Heart:LVAD hum, PMI diffuse   Lungs: clear, no rales or wheezing  Abdomen: soft, non-tender, bowel sounds present  Extremities: no clubbing, cyanosis no LE edema   Neurological: normal speech and affect, no gross motor deficits       Interpretation Summary  HM2 at 9400RPM.  Severely (EF <30%) reduced left ventricular function is present. Mild left  ventricular dilation is present. LVIDd 61mm.  Septum midline.  Aortic valve remain closed with no AI. Normal inflow velocities (62 cm/s).  Outflow velocity cannot be assessed.  Global right ventricular function is mildly to moderately reduced.  Dilation of the inferior vena cava is present with abnormal respiratory  variation in diameter suggests a high RA pressure estimated at 15 mmHg or  greater.  Mild to moderate mitral insufficiency is present.     This study was compared with the study from 8/5/2020 .  IVC is dilated now " with higher RA pressure. Otherwise no significant change      LVAD interrogation: Agree with coordinator note.  No LVAD alarms, no speed drops     Assessment and Plan:    In summary Jim Willingham has a history of NICM (EF 20%) s/p HM II LVAD as DT (initially DT due to obesity).      Overall Jim has been struggling for the last 3 to 4 months.  We have had persistent volume overload, last right heart catheterization showed very elevated wedge pressure therefore he is now at a higher speed.  He has had recurrent atrial and ventricular arrhythmias.  Worsening kidney function as well as bacteremia.    All of this is taken its toll.     I am really happy to see that his pulmonary function tests show improvement in his FEV1 -   This is the highest it has been since LVAD implantation-he is also had a relatively uneventful last year with respect to needing bronchodilators, steroids or antibiotics.     He is now approved for cardiac transplant pending repeat 6-minute walk distance (to make sure he does not desat) as well as repeat of the chest CT.     He is now mentally in the place that he is ready to come into the hospital.     I am dropping his Bumex to 4 twice daily orally also prescribing an early antibiotic for sputum production as this is what we usually do for him.       I am proposing that he come in early next week and be made status 2 E (for multiple LVAD complications)       I have also talked with him about the fact that when he is in the hospital I need him walking, working with physical therapy multiple laps per day as well as doing them NuStep.     I need him in the best shape possible going into transplant.     Should he deteriorate from here (i.e. worsening renal function or worsening functional class he would not be a candidate at that point-I have been clear with him about this)       Chronic systolic heart failure secondary to NICM.    Stage D    NYHA Class III  ACEi/ARB: Presently not on ACE inhibitor  due to rising creatinine  Other afterload reduction: Presently on hydralazine 50- 3 times a day, also on Imdur 30  BB: on hold given decompensated heart failure and low output by last right heart catheterization  Aldosterone antagonist:  note given concerns of low GFR  SCD prophylaxis: CRT-D  Fluid status: Slightly volume down decreasing diuretics as above      Staph hominis bacteremia: cleared-now on oral suppression    CKD Stage III:-Creatinine improved in the hospital with diuretic management and can increase to be          History of ventricular tachycardia-recent recurrence.  I am hoping that control of his fluid will help keep his rhythm stable.  I would like to drop his amiodarone soon if I can given risk for pulmonary function although we have had both atrial and ventricular arrhythmias- in the hospital please drop to 200 mg daily       paroxysmal AF: in sinus presently -on amiodarone 400 mg a day-again looking to lower this if we have some stability -could likely drop to 200 in the hospital (as above)     COPD: per pulmonary they have seen him and feel he can make it through surgery although I am worried about the need for long-term oxygen later on    History of iron deficiency; will resend Fe studies- this could be replaced in the hospital as well     Plan to bring into the hospital Monday with status 2 exception listing.  I will work on statement this weekend      Delisa Montgomery MD   of Medicine   [facility] Division of Cardiology           Please do not hesitate to contact me if you have any questions/concerns.     Sincerely,     Delisa Montgomery MD

## 2021-02-08 ENCOUNTER — HOSPITAL ENCOUNTER (INPATIENT)
Facility: CLINIC | Age: 66
LOS: 112 days | Discharge: HOME HEALTH | DRG: 001 | End: 2021-05-31
Attending: INTERNAL MEDICINE | Admitting: INTERNAL MEDICINE
Payer: COMMERCIAL

## 2021-02-08 ENCOUNTER — TELEPHONE (OUTPATIENT)
Dept: TRANSPLANT | Facility: CLINIC | Age: 66
End: 2021-02-08

## 2021-02-08 ENCOUNTER — MYC MEDICAL ADVICE (OUTPATIENT)
Dept: FAMILY MEDICINE | Facility: CLINIC | Age: 66
End: 2021-02-08

## 2021-02-08 DIAGNOSIS — G56.03 BILATERAL CARPAL TUNNEL SYNDROME: Primary | ICD-10-CM

## 2021-02-08 DIAGNOSIS — M1A.3790 CHRONIC GOUT DUE TO RENAL IMPAIRMENT INVOLVING FOOT WITHOUT TOPHUS, UNSPECIFIED LATERALITY: ICD-10-CM

## 2021-02-08 DIAGNOSIS — I50.22 CHRONIC SYSTOLIC CONGESTIVE HEART FAILURE (H): ICD-10-CM

## 2021-02-08 DIAGNOSIS — I42.8 NICM (NONISCHEMIC CARDIOMYOPATHY) (H): ICD-10-CM

## 2021-02-08 DIAGNOSIS — Z94.1 TRANSPLANTED HEART (H): ICD-10-CM

## 2021-02-08 DIAGNOSIS — Z94.1 S/P ORTHOTOPIC HEART TRANSPLANT (H): Primary | ICD-10-CM

## 2021-02-08 DIAGNOSIS — J44.9 CHRONIC OBSTRUCTIVE PULMONARY DISEASE, UNSPECIFIED COPD TYPE (H): ICD-10-CM

## 2021-02-08 DIAGNOSIS — Z94.1 S/P ORTHOTOPIC HEART TRANSPLANT (H): ICD-10-CM

## 2021-02-08 DIAGNOSIS — Z95.811 LVAD (LEFT VENTRICULAR ASSIST DEVICE) PRESENT (H): ICD-10-CM

## 2021-02-08 DIAGNOSIS — G56.03 BILATERAL CARPAL TUNNEL SYNDROME: ICD-10-CM

## 2021-02-08 PROBLEM — I50.9 DECOMPENSATED HEART FAILURE (H): Status: ACTIVE | Noted: 2021-02-08

## 2021-02-08 LAB
ALBUMIN SERPL-MCNC: 3.7 G/DL (ref 3.4–5)
ALP SERPL-CCNC: 116 U/L (ref 40–150)
ALT SERPL W P-5'-P-CCNC: 47 U/L (ref 0–70)
ANION GAP SERPL CALCULATED.3IONS-SCNC: 5 MMOL/L (ref 3–14)
AST SERPL W P-5'-P-CCNC: 38 U/L (ref 0–45)
BILIRUB DIRECT SERPL-MCNC: 0.2 MG/DL (ref 0–0.2)
BILIRUB SERPL-MCNC: 0.5 MG/DL (ref 0.2–1.3)
BUN SERPL-MCNC: 37 MG/DL (ref 7–30)
CALCIUM SERPL-MCNC: 8.5 MG/DL (ref 8.5–10.1)
CHLORIDE SERPL-SCNC: 100 MMOL/L (ref 94–109)
CO2 SERPL-SCNC: 28 MMOL/L (ref 20–32)
CREAT SERPL-MCNC: 1.65 MG/DL (ref 0.66–1.25)
FIO2-PRE: 21 %
GFR SERPL CREATININE-BSD FRML MDRD: 43 ML/MIN/{1.73_M2}
GLUCOSE BLDC GLUCOMTR-MCNC: 185 MG/DL (ref 70–99)
GLUCOSE BLDC GLUCOMTR-MCNC: 89 MG/DL (ref 70–99)
GLUCOSE SERPL-MCNC: 188 MG/DL (ref 70–99)
INR PPP: 2.12 (ref 0.86–1.14)
NT-PROBNP SERPL-MCNC: 1055 PG/ML (ref 0–900)
POTASSIUM SERPL-SCNC: 3.8 MMOL/L (ref 3.4–5.3)
PROT SERPL-MCNC: 6.9 G/DL (ref 6.8–8.8)
SODIUM SERPL-SCNC: 133 MMOL/L (ref 133–144)

## 2021-02-08 PROCEDURE — 250N000013 HC RX MED GY IP 250 OP 250 PS 637: Performed by: INTERNAL MEDICINE

## 2021-02-08 PROCEDURE — 250N000013 HC RX MED GY IP 250 OP 250 PS 637: Performed by: STUDENT IN AN ORGANIZED HEALTH CARE EDUCATION/TRAINING PROGRAM

## 2021-02-08 PROCEDURE — 80076 HEPATIC FUNCTION PANEL: CPT | Performed by: STUDENT IN AN ORGANIZED HEALTH CARE EDUCATION/TRAINING PROGRAM

## 2021-02-08 PROCEDURE — 214N000001 HC R&B CCU UMMC

## 2021-02-08 PROCEDURE — 99223 1ST HOSP IP/OBS HIGH 75: CPT | Mod: 25 | Performed by: INTERNAL MEDICINE

## 2021-02-08 PROCEDURE — 999N001017 HC STATISTIC GLUCOSE BY METER IP

## 2021-02-08 PROCEDURE — 85610 PROTHROMBIN TIME: CPT | Performed by: INTERNAL MEDICINE

## 2021-02-08 PROCEDURE — 80048 BASIC METABOLIC PNL TOTAL CA: CPT | Performed by: STUDENT IN AN ORGANIZED HEALTH CARE EDUCATION/TRAINING PROGRAM

## 2021-02-08 PROCEDURE — 250N000012 HC RX MED GY IP 250 OP 636 PS 637: Performed by: STUDENT IN AN ORGANIZED HEALTH CARE EDUCATION/TRAINING PROGRAM

## 2021-02-08 PROCEDURE — U0003 INFECTIOUS AGENT DETECTION BY NUCLEIC ACID (DNA OR RNA); SEVERE ACUTE RESPIRATORY SYNDROME CORONAVIRUS 2 (SARS-COV-2) (CORONAVIRUS DISEASE [COVID-19]), AMPLIFIED PROBE TECHNIQUE, MAKING USE OF HIGH THROUGHPUT TECHNOLOGIES AS DESCRIBED BY CMS-2020-01-R: HCPCS | Performed by: STUDENT IN AN ORGANIZED HEALTH CARE EDUCATION/TRAINING PROGRAM

## 2021-02-08 PROCEDURE — 36415 COLL VENOUS BLD VENIPUNCTURE: CPT | Performed by: STUDENT IN AN ORGANIZED HEALTH CARE EDUCATION/TRAINING PROGRAM

## 2021-02-08 PROCEDURE — 83880 ASSAY OF NATRIURETIC PEPTIDE: CPT | Performed by: STUDENT IN AN ORGANIZED HEALTH CARE EDUCATION/TRAINING PROGRAM

## 2021-02-08 PROCEDURE — 84132 ASSAY OF SERUM POTASSIUM: CPT | Performed by: STUDENT IN AN ORGANIZED HEALTH CARE EDUCATION/TRAINING PROGRAM

## 2021-02-08 PROCEDURE — U0005 INFEC AGEN DETEC AMPLI PROBE: HCPCS | Performed by: STUDENT IN AN ORGANIZED HEALTH CARE EDUCATION/TRAINING PROGRAM

## 2021-02-08 PROCEDURE — 36415 COLL VENOUS BLD VENIPUNCTURE: CPT | Performed by: INTERNAL MEDICINE

## 2021-02-08 RX ORDER — ACETAMINOPHEN 325 MG/1
650 TABLET ORAL EVERY 4 HOURS PRN
Status: DISCONTINUED | OUTPATIENT
Start: 2021-02-08 | End: 2021-02-09

## 2021-02-08 RX ORDER — BUPROPION HYDROCHLORIDE 150 MG/1
150 TABLET ORAL EVERY MORNING
Status: DISCONTINUED | OUTPATIENT
Start: 2021-02-09 | End: 2021-03-22

## 2021-02-08 RX ORDER — LIDOCAINE 40 MG/G
CREAM TOPICAL
Status: DISCONTINUED | OUTPATIENT
Start: 2021-02-08 | End: 2021-02-23

## 2021-02-08 RX ORDER — NITROGLYCERIN 0.4 MG/1
0.4 TABLET SUBLINGUAL EVERY 5 MIN PRN
Status: DISCONTINUED | OUTPATIENT
Start: 2021-02-08 | End: 2021-05-31 | Stop reason: HOSPADM

## 2021-02-08 RX ORDER — WARFARIN SODIUM 7.5 MG/1
7.5 TABLET ORAL
Status: COMPLETED | OUTPATIENT
Start: 2021-02-08 | End: 2021-02-08

## 2021-02-08 RX ORDER — ALLOPURINOL 300 MG/1
300 TABLET ORAL DAILY
Status: DISCONTINUED | OUTPATIENT
Start: 2021-02-08 | End: 2021-05-31 | Stop reason: HOSPADM

## 2021-02-08 RX ORDER — CEFDINIR 300 MG/1
300 CAPSULE ORAL 2 TIMES DAILY
Status: COMPLETED | OUTPATIENT
Start: 2021-02-08 | End: 2021-02-13

## 2021-02-08 RX ORDER — WARFARIN SODIUM 7.5 MG/1
7.5 TABLET ORAL
Status: DISCONTINUED | OUTPATIENT
Start: 2021-02-08 | End: 2021-02-08

## 2021-02-08 RX ORDER — MAGNESIUM HYDROXIDE/ALUMINUM HYDROXICE/SIMETHICONE 120; 1200; 1200 MG/30ML; MG/30ML; MG/30ML
30 SUSPENSION ORAL EVERY 4 HOURS PRN
Status: DISCONTINUED | OUTPATIENT
Start: 2021-02-08 | End: 2021-05-31 | Stop reason: HOSPADM

## 2021-02-08 RX ORDER — ACETAMINOPHEN 650 MG/1
650 SUPPOSITORY RECTAL EVERY 4 HOURS PRN
Status: DISCONTINUED | OUTPATIENT
Start: 2021-02-08 | End: 2021-02-17

## 2021-02-08 RX ORDER — DEXTROSE MONOHYDRATE 25 G/50ML
25-50 INJECTION, SOLUTION INTRAVENOUS
Status: DISCONTINUED | OUTPATIENT
Start: 2021-02-08 | End: 2021-05-09

## 2021-02-08 RX ORDER — AMIODARONE HYDROCHLORIDE 200 MG/1
400 TABLET ORAL DAILY
Status: DISCONTINUED | OUTPATIENT
Start: 2021-02-08 | End: 2021-02-09

## 2021-02-08 RX ORDER — MONTELUKAST SODIUM 10 MG/1
10 TABLET ORAL AT BEDTIME
Status: DISCONTINUED | OUTPATIENT
Start: 2021-02-08 | End: 2021-05-31 | Stop reason: HOSPADM

## 2021-02-08 RX ORDER — ALBUTEROL SULFATE 90 UG/1
2 AEROSOL, METERED RESPIRATORY (INHALATION) 4 TIMES DAILY PRN
Status: DISCONTINUED | OUTPATIENT
Start: 2021-02-08 | End: 2021-05-13

## 2021-02-08 RX ORDER — ASPIRIN 81 MG/1
81 TABLET, CHEWABLE ORAL DAILY
Status: DISCONTINUED | OUTPATIENT
Start: 2021-02-08 | End: 2021-05-11

## 2021-02-08 RX ORDER — NICOTINE POLACRILEX 4 MG
15-30 LOZENGE BUCCAL
Status: DISCONTINUED | OUTPATIENT
Start: 2021-02-08 | End: 2021-05-09

## 2021-02-08 RX ORDER — METOLAZONE 2.5 MG/1
2.5 TABLET ORAL DAILY
Status: DISCONTINUED | OUTPATIENT
Start: 2021-02-08 | End: 2021-02-08

## 2021-02-08 RX ORDER — GABAPENTIN 300 MG/1
300 CAPSULE ORAL 3 TIMES DAILY
Status: DISCONTINUED | OUTPATIENT
Start: 2021-02-08 | End: 2021-02-13

## 2021-02-08 RX ORDER — ISOSORBIDE DINITRATE 10 MG/1
10 TABLET ORAL 3 TIMES DAILY
Status: DISCONTINUED | OUTPATIENT
Start: 2021-02-08 | End: 2021-05-06

## 2021-02-08 RX ORDER — BUMETANIDE 2 MG/1
4 TABLET ORAL 2 TIMES DAILY
Status: DISCONTINUED | OUTPATIENT
Start: 2021-02-08 | End: 2021-02-10

## 2021-02-08 RX ORDER — POTASSIUM CHLORIDE 750 MG/1
20 TABLET, EXTENDED RELEASE ORAL ONCE
Status: COMPLETED | OUTPATIENT
Start: 2021-02-08 | End: 2021-02-08

## 2021-02-08 RX ADMIN — HYDRALAZINE HYDROCHLORIDE 75 MG: 25 TABLET, FILM COATED ORAL at 21:26

## 2021-02-08 RX ADMIN — CEFDINIR 300 MG: 300 CAPSULE ORAL at 21:26

## 2021-02-08 RX ADMIN — Medication 10 MG: at 22:48

## 2021-02-08 RX ADMIN — INSULIN GLARGINE 6 UNITS: 100 INJECTION, SOLUTION SUBCUTANEOUS at 21:27

## 2021-02-08 RX ADMIN — POTASSIUM CHLORIDE 20 MEQ: 750 TABLET, EXTENDED RELEASE ORAL at 21:26

## 2021-02-08 RX ADMIN — ISOSORBIDE DINITRATE 10 MG: 10 TABLET ORAL at 16:59

## 2021-02-08 RX ADMIN — BUMETANIDE 4 MG: 2 TABLET ORAL at 21:25

## 2021-02-08 RX ADMIN — ISOSORBIDE DINITRATE 10 MG: 10 TABLET ORAL at 21:26

## 2021-02-08 RX ADMIN — GABAPENTIN 300 MG: 300 CAPSULE ORAL at 21:26

## 2021-02-08 RX ADMIN — MONTELUKAST 10 MG: 10 TABLET, FILM COATED ORAL at 21:25

## 2021-02-08 RX ADMIN — HYDRALAZINE HYDROCHLORIDE 75 MG: 25 TABLET, FILM COATED ORAL at 16:59

## 2021-02-08 RX ADMIN — GABAPENTIN 300 MG: 300 CAPSULE ORAL at 16:59

## 2021-02-08 RX ADMIN — WARFARIN SODIUM 7.5 MG: 7.5 TABLET ORAL at 20:25

## 2021-02-08 ASSESSMENT — ACTIVITIES OF DAILY LIVING (ADL): ADLS_ACUITY_SCORE: 13

## 2021-02-08 ASSESSMENT — MIFFLIN-ST. JEOR: SCORE: 1764.69

## 2021-02-08 NOTE — PLAN OF CARE
Admission  D; Patient admitted from home to be listed for heart transplant.  I: Settled and oriented patient to unit.  A: AxOx4, up ad francisco. HM2 with transparent dressing c/d/i without drainage (Q Saturday changes).  P: Monitor Cards 2 with concerns.

## 2021-02-08 NOTE — Clinical Note
Called to [address] [facility] D[facility]az. All questions answered. All belongings including chart, phone, and extra LVAD supplies sent with patient.

## 2021-02-08 NOTE — H&P
[facility]    Cardiology History and Physical - Cards 2       Date of Admission:  2/8/2021    Assessment & Plan: HVSL    65 year old male hx of NICM EF 20% c/b VT s/p CRT-D s/p HMII LVAD 6/19/2017 originally intended as destination therapy 2/2 obesity however with recent weight loss no candidate for transplantation.     Progressive symptoms at home. Admitted per Dr. Montgomery for optimization and consideration of transplant. Transplant workup as outpatient has been ongoing including 6-minute walk, CT chest. He currently presents euvolemic in no acute distress. Creatinine has been elevated as outpatient but seems to be trending down with less intensive diuresis.     #NICM (EF 20%) s/p HM II LVAD placement (6/19/17) as DT (obesity)  NYHA class III  MAP~. Euvolemic on exam. Current LVAD setting: flow 7.1, speed 9600, pulse index 3.9, pump power 7.1.   - continue LVAD at the current setting  Diuresis: continue bumetanide 4 mg IV bid  Afterload reduction: continue hydralazine to 50 TID and isosorbide dinitrate 10 mg TID   Rate control: continue amiodarone 400 mg daily, not on BB  Antiplatelet: ASA, Anticoagulation: Warfarin  SCD: Metronic dual chamber ICD                #WALTER on CKD  Current Cr. 2.02 < 2.22. Had been uptrending  Over the past 2 weeks with bumetanide 5mg bid. Now back towards baseline. Cardiorenal vs prerenal from diuresis.  - trend BMP  - monitor I/O     #H/o atrial fibrillation on warfarin  #H/o VT/VF on amiodarone  Currently in NSR. On coumadin. Current INR 2.67.   - continue warfarin   - Pharmacy to dose warfarin  - continue amiodarone 400 mg daily     Chronic conditions  DM type 2: glargine 6 unit, Medium dose insuline sliding scale  COPD/Asthma:  Scheduled Breo Ellipta. Continue albuterol prn  Psychiatric condition: Bupropion        Diet:     DVT Prophylaxis: Warfarin  Russell Catheter: not present  Code Status: Full Code  Fluids: none  Lines:  none      Disposition Plan   Expected discharge: 4 - 7 days, recommended to prior living arrangement once advanced heart failure work-up completed.    Entered: Karthik Wilson MD 02/08/2021, 5:08 PM     The patient's care was discussed with the Attending Physician, Dr. Sears.    Karthik Wilson MD  [facility]  Please see sign in/sign out for up to date coverage information  ______________________________________________________________________    Chief Complaint   Dyspnea    History is obtained from the patient    History of Present Illness   Heart failure patient of Dr. Montgomery who presents for decompensated heart failure. Medical history significant for NICM EF 20% c/b VT s/p CRT-D s/p HMII LVAD 6/19/2017 originally intended as destination therapy 2/2 obesity, recent weight loss, atrial fibrillation s/p DCCV on amiodarone and warfarin, history of staph hominis bacteremia, CKD stage 3, COPD who presents for worsening dyspnea on exertion, lethargy.     Patient reports that he has been having progressive difficulties doing things at home. He has very poor exertional capacity. He has a grandaughter that he adopted and his hope is to be able to spend more time with her. He was seen in Dr. Montgomery clinic 1/21 and was found to have worsening edema. His bumetanide was increased to 5mg bid from 4mg bid. He had improvement in his edema but at repeat visit 2/5 was found to have worsening renal function Cr 2.22 < 1.8. Diuretic was decreased back to 4mg bid. With improvement in renal function 2.0.     Because of his continued poor functional status at home despite LVAD, difficulties with volume status, and renal function he was admitted. Denies fevers, chills, lightheadedness, dizziness, nausea, vomiting, diarrhea constipation.         Review of Systems    The 10 point Review of Systems is negative other than noted in the HPI or here.     Past Medical History    I have  reviewed this patient's medical history and updated it with pertinent information if needed.   Past Medical History:   Diagnosis Date     Benign essential hypertension 5/11/2017     Bilateral carpal tunnel syndrome 11/2/2020     Cardiomyopathy, unspecified (H) 5/8/2017     CKD (chronic kidney disease) stage 3, GFR 30-59 ml/min 5/11/2017     COPD (chronic obstructive pulmonary disease) (H) 11/2/2020     Depression 5/11/2017     Diabetes mellitus (H) 5/11/2017     Gouty arthropathy, chronic, without tophi 11/2/2020     H/O gastric bypass 5/11/2017     ICD (implantable cardioverter-defibrillator), biventricular, in situ 5/11/2017     LVAD (left ventricular assist device) present (H)      Major depression, recurrent, chronic (H) 11/2/2020     NICM (nonischemic cardiomyopathy) (H)/ EF 20% 5/11/2017    ECHO: LVEDd. 7.66 cm, Restrictive pattern , Severe mitral valve regurgitation     CECILIA (obstructive sleep apnea) 5/11/2017     Paroxysmal atrial fibrillation (H) 5/11/2017     Paroxysmal VT (H) 5/11/2017     Rotator cuff tear arthropathy of both shoulders 11/2/2020     Uncomplicated asthma      Vitamin B12 deficiency (non anemic) 5/11/2017       Past Surgical History   I have reviewed this patient's surgical history and updated it with pertinent information if needed.  Past Surgical History:   Procedure Laterality Date     ANESTHESIA CARDIOVERSION N/A 5/11/2020    Procedure: ANESTHESIA, FOR CARDIOVERSION @1100;  Surgeon: GENERIC ANESTHESIA PROVIDER;  Location: [facility] OR     CV RIGHT HEART CATH MEASUREMENTS RECORDED N/A 7/24/2019    Procedure: CV RIGHT HEART CATH;  Surgeon: Renu Sears MD;  Location: [facility] HEART CARDIAC CATH LAB     CV RIGHT HEART CATH MEASUREMENTS RECORDED N/A 8/5/2020    Procedure: CV RIGHT HEART CATH;  Surgeon: Nicola Seth MD;  Location: [facility] HEART CARDIAC CATH LAB     CV RIGHT HEART CATH MEASUREMENTS RECORDED N/A 1/7/2021    Procedure: CV RIGHT HEART CATH;  Surgeon: Jac Dover MD;  Location:   HEART CARDIAC CATH LAB     GI SURGERY      Sylvester en Y     INSERT VENTRICULAR ASSIST DEVICE LEFT (HEARTMATE II) N/A 2017    Procedure: INSERT VENTRICULAR ASSIST DEVICE LEFT (HEARTMATE II);  Median Sternotomy Heartmate II Left Ventricular Assist Device Insertion on Pump Oxygenator;  Surgeon: Ronnie Quigley MD;  Location: [facility] OR     ORTHOPEDIC SURGERY      right knee wired     PICC DOUBLE LUMEN PLACEMENT Right 2020    5FR PICC DL. Length-43cm (1cm out).       Social History   I have reviewed this patient's social history and updated it with pertinent information if needed.  Social History     Tobacco Use     Smoking status: Former Smoker     Quit date:      Years since quittin.1     Smokeless tobacco: Never Used   Substance Use Topics     Alcohol use: No     Drug use: No     Family History   I have reviewed this patient's family history and updated it with pertinent information if needed.   I have reviewed this patient's family history and updated it with pertinent information if needed.  Family History   Problem Relation Age of Onset     Cerebrovascular Disease Mother 64     Diabetes Mother      Hypertension Mother      Coronary Artery Disease Father      Diabetes Type 2  Father      Obesity Brother      Obesity Brother      Cerebrovascular Disease Daughter 40       Prior to Admission Medications   Prior to Admission Medications   Prescriptions Last Dose Informant Patient Reported? Taking?   ACE/ARB/ARNI NOT PRESCRIBED (INTENTIONAL)   Yes No   Sig: ACE & ARB not prescribed due to Intolerance   Fluticasone-Umeclidin-Vilanterol (TRELEGY ELLIPTA) 100-62.5-25 MCG/INH oral inhaler   Yes No   Sig: Inhale 1 puff into the lungs daily   acetaminophen (TYLENOL) 325 MG tablet   Yes No   Sig: Take 650 mg by mouth every 6 hours as needed for mild pain   albuterol (PROAIR HFA) 108 (90 Base) MCG/ACT inhaler  Self Yes No   Sig: Inhale 2 puffs into the lungs every 4 hours as needed for shortness of breath /  dyspnea or wheezing    allopurinol (ZYLOPRIM) 300 MG tablet   No No   Sig: Take 1 tablet (300 mg) by mouth daily   amiodarone (PACERONE) 200 MG tablet   Yes No   Sig: Take 400 mg by mouth daily   aspirin 81 MG chewable tablet  Self No No   Si tablet (81 mg) by Oral or Feeding Tube route daily   blood glucose VI test strip   No No   Sig: Use to test blood sugar four times daily or as directed.   blood glucose monitoring (ONE TOUCH ULTRA 2) meter device kit   No No   Sig: Use to test blood sugar four times daily or as directed.   buPROPion (WELLBUTRIN XL) 150 MG 24 hr tablet   No No   Sig: Take 1 tablet (150 mg) by mouth every morning   bumetanide (BUMEX) 2 MG tablet   No No   Sig: Take 2 tablets (4 mg) by mouth 2 times daily   cefdinir (OMNICEF) 300 MG capsule   No No   Sig: Take 1 capsule (300 mg) by mouth 2 times daily   cyanocobalamin (VITAMIN B12) 1000 MCG/ML injection  Self Yes No   Sig: Inject 1,000 mcg into the muscle every 30 days   enoxaparin ANTICOAGULANT (LOVENOX) 60 MG/0.6ML syringe   No No   Sig: Inject 0.6 mLs (60 mg) Subcutaneous every 12 hours   Patient not taking: Reported on 2021   gabapentin (NEURONTIN) 300 MG capsule   Yes No   Si mg 3 times daily 300mg twice daily + 600mg at bedtime    hydrALAZINE (APRESOLINE) 25 MG tablet   No No   Sig: Take 3 tablets (75 mg) by mouth 3 times daily   insulin glargine (LANTUS SOLOSTAR) 100 UNIT/ML pen   No No   Sig: Inject 6 Units Subcutaneous At Bedtime   insulin lispro (HUMALOG KWIKPEN) 100 UNIT/ML (1 unit dial) KWIKPEN   No No   Sig: Inject 1-7 Units Subcutaneous 4 times daily   insulin pen needle (32G X 4 MM) 32G X 4 MM miscellaneous   No No   Sig: Use four pen needles daily or as directed.   isosorbide dinitrate (ISORDIL) 10 MG tablet   No No   Sig: Take 1 tablet (10 mg) by mouth 3 times daily   metolazone (ZAROXOLYN) 2.5 MG tablet   No No   Sig: Take 1 tablet (2.5 mg) by mouth as needed (take ONLY as directed by VAD team)   montelukast  (SINGULAIR) 10 MG tablet  Self Yes No   Sig: Take 10 mg by mouth At Bedtime    potassium chloride ER (KLOR-CON M) 20 MEQ CR tablet   No No   Sig: Take 3 tablets (60 mEq) by mouth daily   predniSONE (DELTASONE) 5 MG tablet   No No   Sig: Take 1 tablet (5 mg) by mouth daily   warfarin ANTICOAGULANT (COUMADIN) 2.5 MG tablet   No No   Sig: Take 7.5 mg daily except Tuesdays then take 10 mg on that day or as directed by the anticoagulation clinic   zinc sulfate (ZINCATE) 220 (50 Zn) MG capsule   Yes No   Sig: Take 220 mg by mouth 2 times daily      Facility-Administered Medications: None     Allergies   Allergies   Allergen Reactions     Beer Shortness Of Breath     Grass Shortness Of Breath     Ace Inhibitors Cough     Dust Mites Other (See Comments)     Asthma     Mold Other (See Comments)     Asthma     Penicillins Other (See Comments)     Unknown - childhood exposure    Tolerated Zosyn 9/18-9/20/2020     Sulfa Drugs Other (See Comments) and Unknown     Unknown childhood reaction       Physical Exam   Vital Signs: Temp: 98.1  F (36.7  C) Temp src: Oral BP: 109/51 Pulse: 71   Resp: 20 SpO2: 97 % O2 Device: None (Room air)    Weight: 219 lbs 6.4 oz    In general, the patient is a pleasant male in no apparent distress.      HEENT: NC/AT.  PERRLA.  EOMI.  Sclerae white, not injected.    Neck: Carotids 2+ bilaterally without bruits.  No jugular venous distension.   Lymph: No cervical adenopathy. No thyromegaly.   Heart: RRR. LVAD hum sounds. No murmur, rub, click, or gallop. There is no heave.    Lungs: Clear bilaterally. No rhonchi, wheezes, rales.   GI: Soft, nontender, nondistended.   Extremities: Trace edema.  The pulses are 2+at the radial and DP bilaterally.  Neuro: grossly non focal.   Skin: no rashes.  Endocrine: no thyromegaly  Musculoskeletal: no joint swelling or tenderness, gait normal.  Psych: pleasant and conversant      Data   Data reviewed today: I reviewed all medications, new labs and imaging results over  the last 24 hours.     Recent Labs   Lab 02/08/21  1659 02/05/21  0755   INR  --  2.67*    137   POTASSIUM 3.8 4.2   CHLORIDE 100 100   CO2 28 28   BUN 37* 47*   CR 1.65* 2.02*   ANIONGAP 5 9   QAMAR 8.5 8.4*   * 161*   ALBUMIN 3.7  --    PROTTOTAL 6.9  --    BILITOTAL 0.5  --    ALKPHOS 116  --    ALT 47  --    AST 38  --      TTE 1/7/2021  Interpretation Summary  HM2 at 9400RPM.  Severely (EF <30%) reduced left ventricular function is present. Moderate left  ventricular dilation is present. LVIDd 68mm. Septum midline.  RV is not well visulized. At least mildly reduced.  Aortic valve remain closed with no AI. Normal inflow velocities (77 cm/s).  Outflow velocity cannot be assessed.  Mild to moderate eccentric mitral insufficiency is present.  Dilation of the inferior vena cava is present with abnormal respiratory  variation in diameter.  IVC diameter >2.1 cm collapsing <50% with sniff suggests a high RA pressure estimated at 15 mmHg or greater.  This study was compared with the study from 9/19/2020 .LVIDd (previous study 63 mm) enlarged further. Otherwise no significant change.

## 2021-02-08 NOTE — TELEPHONE ENCOUNTER
Outpatient Clinic Note 02/05/21    Met with patient and  Dr. Montgomery and patient's VAD coordinator, Rajani.    Discussed the plan for transplant follow up:      Reviewed updated testing for chest CT and 6MW with Dr. Montgomery and patient.  Made a plan to admit on Monday 2-8-21 for heart failure and possible listing for transplant.. See Dr. Montgomery's note for further information in regards to listing.  Will consider status 2E due to multiple VAD complications.    Spoke to patient and 6C on 2-8-21 and confirmed admission, patient will plan to be at the hospital this afternoon as planned.     Patient verbalized understanding of the plan and agrees to call Transplant Coordinator with any further questions or concerns at [phone].

## 2021-02-08 NOTE — PROGRESS NOTES
PT came to unit [address] on 2/8 with the following belongings:    - cell phone and   - duffel bag of clothes  - black bag with computer  - CPAP  - extra batteries for LVAD

## 2021-02-08 NOTE — LETTER
2021    Jim Willingham  [address]  [place] [address]      Dear Mr. Willingham,    This letter is sent to confirm that you have completed your transplant work-up and you are a candidate in the heart transplant program at the [facility].  You were placed on the heart active waitlist on 21.      When you are active on the waitlist and an organ becomes available, a coordinator will need to speak to you immediately.  You could be contacted at any time during the day or night as an organ could become available at any time.  Please make certain our office always has your current telephone numbers and address.      Items we will need from you:      We have received approval from your insurance company for the transplant procedure.  It is critical that you notify us if there is any change in your insurance.  It is also important that you familiarize yourself with the details of your specific insurance policy.  Our patient  is available to assist you if you should have any questions regarding your coverage.      An ALA or PRA blood sample may need to be sent here every 3 to 6 months to match you with  donors or any potential living donors.  If you need this testing, special mailing boxes (called mailers) will be sent to you directly from the Outreach Department.  You should take the physician order form and the  to your home laboratory when it is time to for this testing to be done.  Additional mailers can be obtained by calling the Transplant Office and asking to speak to a heart .      During this waiting period, we may request additional periodic laboratory tests with your primary physician.  It will be your responsibility to remind your physician to forward your results to the Transplant Office.      We need to be kept informed of any changes in your medical condition such as:    o changes in  your medications,   o significant changes in your health  o significant infections (such as pneumonia or abscesses)  o blood transfusions  o any condition which requires hospitalization  o any surgery      Remember to complete any routine cancer screening tests required before your transplant.  This includes colonoscopy; prostate screening for men, and mammogram and gynecologic testing for women, as well as dental work.  Your primary care clinic can assist you with arranging for these exams.  Remind your caregivers to forward copies of the records and final reports.    We want you to know that our program has physician and surgeon coverage 24 hours a day, 365 days a year. If this coverage changes or there are substantial program changes, you will be notified in writing by letter.     Attached is a letter from the [employer] Organ [employer]aring (UN[employer]). It describes the services and information offered to patients by UNOS and the Organ Procurement and Transplantation Network.    We appreciate having had the opportunity to participate in your care.  If you have questions, please feel free to call the Transplant Office at [phone] or [phone].      Sincerely,      Solid Organ Transplant  [facility], [facility]      Enclosures: UNOS Letter  cc: Care Team                          The Organ Procurement and Transplantation Network  Toll-free patient services line:     Your resource for organ transplant information    If you have a question regarding your own medical care, you always should call your transplant hospital first. However, for general organ transplant-related information, you can call the Organ Procurement and Transplantation Network (OPTN) toll-free patient services line at [phone]. Anyone, including potential transplant candidates, candidates, recipients, family members, friends, living donors, and  donor family members, can call this number to:          Talk about organ donation, living donation, the transplant process, the donation process, and transplant policies.    Get a free patient information kit with helpful booklets, waiting list and transplant information, and a list of all transplant hospitals.    Ask questions about the OPTN website (https://optn.transplant.hrsa.gov/), the [employer] Organ [employer]aring s (UNOS) website (https://unos.org/), or the UNOS website for living donors and transplant recipients. (https://www.transplantliving.org/).    Learn how the OPTN can help you.    Talk about any concerns that you may have with a transplant hospital.    The [facility] s transplant system, the OPTN, is managed under federal contract by the [employer]aring (UNOS), which is a non-profit charitable organization. The OPTN helps create and define organ sharing policies that make the best use of donated organs. This process continuously evaluating new advances and discoveries so policies can be adapted to best serve patients waiting for transplants. To do so, the OPTN works closely with transplant professionals, transplant patients, transplant candidates, donor families, living donors, and the public. All transplant programs and organ procurement organizations throughout the country are OPTN members and are obligated to follow the policies the OPTN creates for allocating organs.    The OPTN also is responsible for:      Providing educational material for patients, the public, and professionals.    Raising awareness of the need for donated organs and tissue.    Coordinating organ procurement, matching, and placement.    Collecting information about every organ transplant and donation that occurs in the [place].    Remember, you should contact your transplant hospital directly if you have questions or concerns about your own medical care including medical records, work-up progress, and  test results.    We are not your transplant hospital, and our staff will not be able to answer questions about your case, so please keep your transplant hospital s phone number handy.    However, while you research your transplant needs and learn as much as you can about transplantation and donation, we welcome your call to our toll-free patient services line at [phone].          Updated 4/1/2019

## 2021-02-08 NOTE — Clinical Note
dry, intact, no bleeding and no hematoma. 7 fr sheath removed from R internal jugular. Manual pressure held. Hemostasis achieved. Primapore dressing applied.

## 2021-02-09 ENCOUNTER — TELEPHONE (OUTPATIENT)
Dept: PLASTIC SURGERY | Facility: CLINIC | Age: 66
End: 2021-02-09

## 2021-02-09 LAB
ANION GAP SERPL CALCULATED.3IONS-SCNC: 7 MMOL/L (ref 3–14)
BUN SERPL-MCNC: 35 MG/DL (ref 7–30)
CALCIUM SERPL-MCNC: 8.4 MG/DL (ref 8.5–10.1)
CHLORIDE SERPL-SCNC: 103 MMOL/L (ref 94–109)
CO2 SERPL-SCNC: 27 MMOL/L (ref 20–32)
CREAT SERPL-MCNC: 1.43 MG/DL (ref 0.66–1.25)
FERRITIN SERPL-MCNC: 33 NG/ML (ref 26–388)
GFR SERPL CREATININE-BSD FRML MDRD: 52 ML/MIN/{1.73_M2}
GLUCOSE BLDC GLUCOMTR-MCNC: 101 MG/DL (ref 70–99)
GLUCOSE BLDC GLUCOMTR-MCNC: 122 MG/DL (ref 70–99)
GLUCOSE BLDC GLUCOMTR-MCNC: 87 MG/DL (ref 70–99)
GLUCOSE BLDC GLUCOMTR-MCNC: 95 MG/DL (ref 70–99)
GLUCOSE BLDC GLUCOMTR-MCNC: 95 MG/DL (ref 70–99)
GLUCOSE SERPL-MCNC: 103 MG/DL (ref 70–99)
INR PPP: 2.32 (ref 0.86–1.14)
IRON SATN MFR SERPL: 10 % (ref 15–46)
IRON SERPL-MCNC: 28 UG/DL (ref 35–180)
LABORATORY COMMENT REPORT: NORMAL
POTASSIUM SERPL-SCNC: 3.3 MMOL/L (ref 3.4–5.3)
POTASSIUM SERPL-SCNC: 4.3 MMOL/L (ref 3.4–5.3)
SARS-COV-2 RNA RESP QL NAA+PROBE: NEGATIVE
SODIUM SERPL-SCNC: 137 MMOL/L (ref 133–144)
SPECIMEN SOURCE: NORMAL
TIBC SERPL-MCNC: 285 UG/DL (ref 240–430)

## 2021-02-09 PROCEDURE — 83550 IRON BINDING TEST: CPT | Performed by: INTERNAL MEDICINE

## 2021-02-09 PROCEDURE — 99232 SBSQ HOSP IP/OBS MODERATE 35: CPT | Mod: 25 | Performed by: NURSE PRACTITIONER

## 2021-02-09 PROCEDURE — 84132 ASSAY OF SERUM POTASSIUM: CPT | Performed by: INTERNAL MEDICINE

## 2021-02-09 PROCEDURE — 80048 BASIC METABOLIC PNL TOTAL CA: CPT | Performed by: INTERNAL MEDICINE

## 2021-02-09 PROCEDURE — 36415 COLL VENOUS BLD VENIPUNCTURE: CPT | Performed by: INTERNAL MEDICINE

## 2021-02-09 PROCEDURE — 85610 PROTHROMBIN TIME: CPT | Performed by: INTERNAL MEDICINE

## 2021-02-09 PROCEDURE — 250N000013 HC RX MED GY IP 250 OP 250 PS 637: Performed by: STUDENT IN AN ORGANIZED HEALTH CARE EDUCATION/TRAINING PROGRAM

## 2021-02-09 PROCEDURE — 214N000001 HC R&B CCU UMMC

## 2021-02-09 PROCEDURE — 250N000013 HC RX MED GY IP 250 OP 250 PS 637: Performed by: INTERNAL MEDICINE

## 2021-02-09 PROCEDURE — 5A09357 ASSISTANCE WITH RESPIRATORY VENTILATION, LESS THAN 24 CONSECUTIVE HOURS, CONTINUOUS POSITIVE AIRWAY PRESSURE: ICD-10-PCS | Performed by: INTERNAL MEDICINE

## 2021-02-09 PROCEDURE — 82728 ASSAY OF FERRITIN: CPT | Performed by: INTERNAL MEDICINE

## 2021-02-09 PROCEDURE — 93750 INTERROGATION VAD IN PERSON: CPT | Performed by: NURSE PRACTITIONER

## 2021-02-09 PROCEDURE — 250N000013 HC RX MED GY IP 250 OP 250 PS 637: Performed by: NURSE PRACTITIONER

## 2021-02-09 PROCEDURE — 83540 ASSAY OF IRON: CPT | Performed by: INTERNAL MEDICINE

## 2021-02-09 PROCEDURE — 999N001017 HC STATISTIC GLUCOSE BY METER IP

## 2021-02-09 PROCEDURE — 250N000011 HC RX IP 250 OP 636: Performed by: NURSE PRACTITIONER

## 2021-02-09 PROCEDURE — 258N000003 HC RX IP 258 OP 636: Performed by: NURSE PRACTITIONER

## 2021-02-09 PROCEDURE — 36415 COLL VENOUS BLD VENIPUNCTURE: CPT | Performed by: NURSE PRACTITIONER

## 2021-02-09 RX ORDER — DIPHENHYDRAMINE HYDROCHLORIDE 50 MG/ML
50 INJECTION INTRAMUSCULAR; INTRAVENOUS
Status: DISCONTINUED | OUTPATIENT
Start: 2021-02-09 | End: 2021-02-14 | Stop reason: CLARIF

## 2021-02-09 RX ORDER — SENNOSIDES 8.6 MG
650 CAPSULE ORAL EVERY 6 HOURS PRN
Status: ON HOLD | COMMUNITY
End: 2021-05-31

## 2021-02-09 RX ORDER — NALOXONE HYDROCHLORIDE 0.4 MG/ML
0.2 INJECTION, SOLUTION INTRAMUSCULAR; INTRAVENOUS; SUBCUTANEOUS
Status: DISCONTINUED | OUTPATIENT
Start: 2021-02-09 | End: 2021-05-31 | Stop reason: HOSPADM

## 2021-02-09 RX ORDER — POTASSIUM CHLORIDE 750 MG/1
40 TABLET, EXTENDED RELEASE ORAL ONCE
Status: COMPLETED | OUTPATIENT
Start: 2021-02-09 | End: 2021-02-09

## 2021-02-09 RX ORDER — ACETAMINOPHEN 325 MG/1
975 TABLET ORAL EVERY 6 HOURS PRN
Status: DISCONTINUED | OUTPATIENT
Start: 2021-02-09 | End: 2021-02-15

## 2021-02-09 RX ORDER — POTASSIUM CHLORIDE 1500 MG/1
60 TABLET, EXTENDED RELEASE ORAL DAILY
Status: DISCONTINUED | OUTPATIENT
Start: 2021-02-10 | End: 2021-02-16

## 2021-02-09 RX ORDER — AMIODARONE HYDROCHLORIDE 200 MG/1
200 TABLET ORAL DAILY
Status: DISCONTINUED | OUTPATIENT
Start: 2021-02-10 | End: 2021-05-06

## 2021-02-09 RX ORDER — METHYLPREDNISOLONE SODIUM SUCCINATE 125 MG/2ML
125 INJECTION, POWDER, LYOPHILIZED, FOR SOLUTION INTRAMUSCULAR; INTRAVENOUS
Status: DISCONTINUED | OUTPATIENT
Start: 2021-02-09 | End: 2021-02-14 | Stop reason: CLARIF

## 2021-02-09 RX ORDER — NALOXONE HYDROCHLORIDE 0.4 MG/ML
0.4 INJECTION, SOLUTION INTRAMUSCULAR; INTRAVENOUS; SUBCUTANEOUS
Status: DISCONTINUED | OUTPATIENT
Start: 2021-02-09 | End: 2021-05-31 | Stop reason: HOSPADM

## 2021-02-09 RX ORDER — WARFARIN SODIUM 5 MG/1
TABLET ORAL
Status: ON HOLD | COMMUNITY
End: 2021-05-31

## 2021-02-09 RX ORDER — OXYCODONE HYDROCHLORIDE 5 MG/1
5 TABLET ORAL EVERY 6 HOURS PRN
Status: DISCONTINUED | OUTPATIENT
Start: 2021-02-09 | End: 2021-02-12

## 2021-02-09 RX ORDER — WARFARIN SODIUM 10 MG/1
10 TABLET ORAL
Status: COMPLETED | OUTPATIENT
Start: 2021-02-09 | End: 2021-02-09

## 2021-02-09 RX ADMIN — Medication 10 MG: at 22:42

## 2021-02-09 RX ADMIN — GABAPENTIN 300 MG: 300 CAPSULE ORAL at 21:27

## 2021-02-09 RX ADMIN — OXYCODONE HYDROCHLORIDE 5 MG: 5 TABLET ORAL at 13:42

## 2021-02-09 RX ADMIN — GABAPENTIN 300 MG: 300 CAPSULE ORAL at 13:47

## 2021-02-09 RX ADMIN — BUMETANIDE 4 MG: 2 TABLET ORAL at 07:43

## 2021-02-09 RX ADMIN — CEFDINIR 300 MG: 300 CAPSULE ORAL at 21:30

## 2021-02-09 RX ADMIN — BUMETANIDE 4 MG: 2 TABLET ORAL at 21:27

## 2021-02-09 RX ADMIN — WARFARIN SODIUM 10 MG: 10 TABLET ORAL at 17:36

## 2021-02-09 RX ADMIN — HYDRALAZINE HYDROCHLORIDE 75 MG: 25 TABLET, FILM COATED ORAL at 21:27

## 2021-02-09 RX ADMIN — ISOSORBIDE DINITRATE 10 MG: 10 TABLET ORAL at 13:48

## 2021-02-09 RX ADMIN — ACETAMINOPHEN 650 MG: 325 TABLET, FILM COATED ORAL at 04:55

## 2021-02-09 RX ADMIN — INSULIN GLARGINE 6 UNITS: 100 INJECTION, SOLUTION SUBCUTANEOUS at 21:30

## 2021-02-09 RX ADMIN — ISOSORBIDE DINITRATE 10 MG: 10 TABLET ORAL at 21:27

## 2021-02-09 RX ADMIN — BUPROPION HYDROCHLORIDE 150 MG: 150 TABLET, EXTENDED RELEASE ORAL at 07:44

## 2021-02-09 RX ADMIN — CEFDINIR 300 MG: 300 CAPSULE ORAL at 07:45

## 2021-02-09 RX ADMIN — OXYCODONE HYDROCHLORIDE 5 MG: 5 TABLET ORAL at 20:25

## 2021-02-09 RX ADMIN — GABAPENTIN 300 MG: 300 CAPSULE ORAL at 07:46

## 2021-02-09 RX ADMIN — HYDRALAZINE HYDROCHLORIDE 75 MG: 25 TABLET, FILM COATED ORAL at 07:44

## 2021-02-09 RX ADMIN — ACETAMINOPHEN 975 MG: 325 TABLET, FILM COATED ORAL at 17:36

## 2021-02-09 RX ADMIN — IRON SUCROSE 200 MG: 20 INJECTION, SOLUTION INTRAVENOUS at 10:37

## 2021-02-09 RX ADMIN — FLUTICASONE FUROATE AND VILANTEROL TRIFENATATE 1 PUFF: 100; 25 POWDER RESPIRATORY (INHALATION) at 07:51

## 2021-02-09 RX ADMIN — HYDRALAZINE HYDROCHLORIDE 75 MG: 25 TABLET, FILM COATED ORAL at 13:47

## 2021-02-09 RX ADMIN — ASPIRIN 81 MG CHEWABLE TABLET 81 MG: 81 TABLET CHEWABLE at 07:43

## 2021-02-09 RX ADMIN — ACETAMINOPHEN 975 MG: 325 TABLET, FILM COATED ORAL at 10:07

## 2021-02-09 RX ADMIN — ALLOPURINOL 300 MG: 300 TABLET ORAL at 07:46

## 2021-02-09 RX ADMIN — AMIODARONE HYDROCHLORIDE 400 MG: 200 TABLET ORAL at 07:46

## 2021-02-09 RX ADMIN — POTASSIUM CHLORIDE 40 MEQ: 750 TABLET, EXTENDED RELEASE ORAL at 06:30

## 2021-02-09 RX ADMIN — ISOSORBIDE DINITRATE 10 MG: 10 TABLET ORAL at 07:44

## 2021-02-09 RX ADMIN — MONTELUKAST 10 MG: 10 TABLET, FILM COATED ORAL at 21:27

## 2021-02-09 RX ADMIN — UMECLIDINIUM 1 PUFF: 62.5 AEROSOL, POWDER ORAL at 07:50

## 2021-02-09 ASSESSMENT — ACTIVITIES OF DAILY LIVING (ADL)
ADLS_ACUITY_SCORE: 13

## 2021-02-09 ASSESSMENT — MIFFLIN-ST. JEOR: SCORE: 1757.89

## 2021-02-09 NOTE — PROGRESS NOTES
[facility]   Cardiology II Service / Advanced Heart Failure  Daily Progress Note      Patient: Jim Willingham  MRN: [id number]  Admission Date: 2/8/2021  Hospital Day # 1    Assessment and Plan: Jim Willingham is a 65 year old male with history of NICM EF 20% c/b VT s/p CRT-D s/p HMII LVAD 6/19/2017 originally intended as destination therapy 2/2 obesity however with recent weight loss now candidate for transplantation. He is admitted for worsening functional status and renal function concerning for worsening heart failure. He is now listed status 2E for transplant, extension due 2/22/21.    Today's Plan:  -continue current diuretic  -IV iron x 5 days  -resume pta kcl 40 meq daily  -decrease amio to 200 mg daily per clinic recommendations  -will discuss inpatient carpal tunnel release with the transplant division     #Chronic systolic heart failure 2/2 NICM (EF 20%)   #s/p HM II LVAD placement (6/19/17) as DT (obesity)  #Listed status 2E for transplant due to multiple LVAD complications  Stage D. NYHA class III. TTE 1/8/21 at 9400rpm, EF<30%m LVIDd 6.8cm, at least mildly reduced RV function, AoV closed without AI, mild-mod eccentric MR, dilated IVC without collapse.     Fluid status: euvolemic, continue bumetanide 4 mg po BID  BB: deferred due to recent decompensations and low cardiac output  ACEi/ARB/Afterload reduction: hydralazine  50 TID, isosorbide dinitrate 10 mg TID   Antiplatelet: ASA 81 mg   Anticoagulation: warfarin dosing per pharmacy, INR goal 2-3, INR 2.3 today  SCD: dual chamber ICD                #Staph hominis bacteremia  Cleared, no concerns for active infection.   - continue pta cefdinir for suppression    #WALTER on CKD, improved  Admission Cr. 2.02 < 2.22. Improved with decreased Bumex.   - daily BMP     #H/o atrial fibrillation on warfarin  #H/o VT/VF on amiodarone  Currently in NSR.   - continue warfarin as above  - decrease amiodarone 200 mg daily per Dr Knott note from 2/5  "given transplant listing     Chronic conditions  #DM type II: glargine 6 unit, medium dose insuline sliding scale  #COPD/Asthma: pta Breo Ellipta, albuterol prn  #Psychiatric condition: pta Bupropion     Diet: 2g Na 2L FR  DVT Prophylaxis: warfarin  Code Status: full Code  Lines: DAWSON Wilder DNP, NP-C  Advanced Heart Failure/Cardiology II Service  Pager [phone] [facility] [id number]    ================================================================    Subjective/24-Hr Events:   Last 24 hr care team notes reviewed. 50 beat run of slow VT overnight, no symptoms. No complaints today. Walked 1000 steps already today, denies exertional symptoms. Carries most of his fluid in abdomen and feels ok today.     ROS:  4 point ROS including respiratory, CV, GI and  (other than that noted in the HPI) is negative.     Medications: Reviewed in EPIC.     Physical Exam:   BP (!) 80/72 (BP Location: Right arm)   Pulse 65   Temp 99  F (37.2  C) (Oral)   Resp 16   Ht 1.727 m (5' 8\")   Wt 98.8 kg (217 lb 14.4 oz)   SpO2 98%   BMI 33.13 kg/m      GENERAL: Appears comfortable, in no distress.  HEENT: Eye symmetrical, no discharge or icterus bilaterally. Mucous membranes moist and without lesions.  NECK: Supple, JVD not visible at 90 degrees.   CV: +mechanical LVAD hum.  RESPIRATORY: Respirations regular, even, and unlabored. Lungs CTA throughout.    GI: Soft and non distended with normoactive bowel sounds present in all quadrants. No tenderness, rebound, guarding.   EXTREMITIES: Trace peripheral edema. Non pulsatile.   NEUROLOGIC: Alert and oriented x 3. No focal deficits.   MUSCULOSKELETAL: No joint swelling or tenderness.   SKIN: No jaundice. No rashes or lesions.     Labs:  CMP  Recent Labs   Lab 02/09/21  1008 02/09/21  0518 02/08/21  1659 02/05/21  0755   NA  --  137 133 137   POTASSIUM 4.3 3.3* 3.8 4.2   CHLORIDE  --  103 100 100   CO2  --  27 28 28   ANIONGAP  --  7 5 9   GLC  --  103* 188* 161*   BUN  --  35* 37* 47* "   CR  --  1.43* 1.65* 2.02*   GFRESTIMATED  --  52* 43* 34*   GFRESTBLACK  --  60* 50* 39*   QAMAR  --  8.4* 8.5 8.4*   PROTTOTAL  --   --  6.9  --    ALBUMIN  --   --  3.7  --    BILITOTAL  --   --  0.5  --    ALKPHOS  --   --  116  --    AST  --   --  38  --    ALT  --   --  47  --      INR  Recent Labs   Lab 02/09/21  0518 02/08/21  1822 02/05/21  0755   INR 2.32* 2.12* 2.67*       Time/Communication  I personally spent a total of 25 minutes. Of that 15 minutes was counseling/coordination of patient's care. Plan of care discussed with patient. See my note above for details.    Patient discussed with Dr. Sears.

## 2021-02-09 NOTE — PROGRESS NOTES
"SPIRITUAL HEALTH SERVICES  SPIRITUAL ASSESSMENT Progress Note  [facility] ([place] 6C     REFERRAL SOURCE: I did visit this morning patient Jim per [facility]  referral. Pt have some fear and anxiety regarding the procedure that he is waiting for. Pt was so polite and he said, \"I have good communication with my own  and they still praying for me and I am okay regarding spiritual support but if you pray for me, I will appreciate that help, can you?\" After I listen his current concern and needs reflectively, I encouraged him by the word of God from the Bible and he was so glad to hear that. At the end of our conversation, I offered him a prayer based on his prayer request.    PLAN: I will remain open to provide spiritual care for the pt as needed.    Chari Lui M.Div. ([place]), M.Th., D.Min., [facility]  Staff   Pager [phone]   "

## 2021-02-09 NOTE — PROCEDURES
The patient's HeartMate LVAD was interrogated 2/9/2021  * Speed 9600 rpm   * Pulsatility index 3.1-4.5   * Power 6-7 Manuel   * Flow 5.4-6.6 L/minute   Fluid status: euvolemic   Alarms were reviewed, and notable for several PI events per baseline, some with speed drops.   The driveline exit site was inspected, dressing cdi.   All external components were inspected and showed no evidence of damage or malfunction, none replaced.   No changes to VAD settings made

## 2021-02-09 NOTE — UTILIZATION REVIEW
Admission Status; Secondary Review Determination       Under the authority of the Utilization Management Committee, the utilization review process indicated a secondary review on the above patient. The review outcome is based on review of the medical records, discussions with staff, and applying clinical experience noted on the date of the review.     (x) Inpatient Status Appropriate - This patient's medical care is consistent with medical management for inpatient care and reasonable inpatient medical practice.     RATIONALE FOR DETERMINATION   64 yo man with nonischemic cardiomyopathy EF 25% complicated by ventricular tachycardia, s/p CRT-D s/p HMII LVAD 6/19/2017, atrial fibrillation s/p DCCV on amiodarone and warfarin, history of staph hominis bacteremia, CKD stage 3, COPD who was admitted for decompensated heart failure. He has ongoing poor functional capacity, worsening dyspnea on exertion, lethargy despite multiple attempts at LVAD adjustments and medication titrations resulting in worsened renal function. Several blood pressure measurements of 80/50 & 85/52.     This patient is complicated, has failed outpatient management of his conditions, has multiple ongoing issues, is at risk for clinical deterioration, requires extensive evaluation and stabilization, will likely require several day hospitalization and is appropriate for inpatient hospitalization at the time of this review.    At the time of admission with the information available to the attending physician more than 2 nights hospital complex care was anticipated, based on patient risk of adverse outcome if treated as outpatient and complex care required. Inpatient admission is appropriate based on the Medicare guidelines.     This document was produced using voice recognition software.    The information on this document is developed by the utilization review team in order for the business office to ensure compliance. This only denotes the  appropriateness of proper admission status and does not reflect the quality of care rendered.   The definitions of Inpatient Status and Observation Status used in making the determination above are those provided in the CMS Coverage Manual, Chapter 1 and Chapter 6, section 70.4.     Sincerely,   Tammie Corrigan MD  Utilization Review  Physician Advisor  [facility].

## 2021-02-09 NOTE — PROVIDER NOTIFICATION
~0315 unsustained run of VT ~50 beats w/HR 110s-120s, pt sleeping/asymptomatic, Cards CC notified.

## 2021-02-09 NOTE — TELEPHONE ENCOUNTER
This patient is currently at the [facility] awaiting a heart transplant.  He was hoping to meet with the hand surgeon before then to discuss his bilateral carpal tunnel syndrome.  I scheduled him for 1st available but it's not until April which will be about the time his heart surgery will hopefully be.   Is there any way he can be seen sooner? I did add him to the wait list as well.

## 2021-02-09 NOTE — PLAN OF CARE
D: Patient admitted 2/8 for optimization and consideration of heart transplantation. Hx. NICM, VT s/p CRT-D, s/p LVAD HM 2 6/2017 as DT (however w/recent weight loss now candidate for transplant), COPD, CKD, DM2.  I/A: A&Ox4. VSS on RA. Afebrile. SR 70s-80s (VT run overnight, Pt asymptomatic, Cards CC aware). LVAD #s wnl/stable/BL for patient, no alarms. C/o R wrist soreness d/t carpel tunnel partially relieved by prn tylenol. Adequate uop. SBA. Appeared to rest comfortably overnight. AM K+ 3.3 replaced per protocol, recheck timed for 1030a.   P: Continue to monitor and notify Cards 2 with questions/concerns.

## 2021-02-09 NOTE — PLAN OF CARE
Shift summary    Neuro: A&Ox4.   Cardiac: Afebrile, VSS. SR with PVCs. HM2@9600.   Respiratory: RA   GI/: Voiding spontaneously. Last BM 2/7  Diet/appetite: Tolerating cardiac diet.   Activity: Up ad francisco.    Pain: . Denies   Skin: Intact except for driveline. Transparent dressing c/d/I. Last changed Saturday 2/6. Patient understands that he will switch to daily dressing changes when next change is due.  Lines: PIV SL'd.  Replacements: K 3.8, 20meq given this evening per protocol.

## 2021-02-09 NOTE — PROGRESS NOTES
Status 2 Heart Listing 02/08/21  Listing Criteria:  Exception    Jim Willingham was approved for activation on the heart transplant waitlist by the Heart Transplant Committee.      Dr. Montgomery and Dg approved the following statement prior to the submission of Status 2 Exception form in [facility]:    65 M with NICM who had a HM3 implanted on 6/19/2017 at destination therapy (obesity). He initially thrived after surgery with significant functional improvement. Over the last 10 months, however, his functional status has deteriorated.   He has developed worsening heart failure symptoms associated with significant weight loss (50 lbs over the last year) with visible muscle wasting and worsening cardiorenal syndrome  (baseline cr 1.4 now close to 2.0). Last hemodynamics: RA18, PCWP 25, CI 2.0. While  hemodynamics improved with increased speed and increased afterload reduction, he continues to have extremely high diuretic requirements to maintain acceptable volume status which has resulted in worsening renal function on oral diuretics (note, no AI on echo and normal inflow and outflow velocities).   In addition to his poor hemodynamics, he has developed several other significant LVAD complications.  He developed recurrent bacteremia (staph hominis) requiring prolonged IV antibiotics (last positive culture 9/2020) currently on oral suppression.  He also recently had several episodes of sustained VT and finally therapy for VF on 12/11/2020.   As he is a large blood group O, we are asking for status 2 E given multiple LVAD complications and risk of losing his window of candidacy for heart transplantation.      Insurance Approval:  YES (verify prior to listing with PFR)  ABO verification complete:  YES  2nd ABO verified by:  Michelle Boswell RN    On-call transplant coordinators, transplant LPN, immunology lab and PFR notified of listing.  Patient's primary cardiologist and/or attending physician is in agreement with this plan.       Status 2 Extension due 02/22/21

## 2021-02-09 NOTE — PHARMACY-ADMISSION MEDICATION HISTORY
Admission Medication History Completed by Pharmacy    See [facility] Admission Navigator for allergy information, preferred outpatient pharmacy, prior to admission medications and immunization status.     Medication History Sources:     Phone conversation with patient    Review dispensed history via [employer]     Chart review     Changes made to PTA medication list (reason):    Added: None    Deleted: Enoxaparin (course complete as INR therapeutic)     Changed:   o Gabapentin: 300 mg TID + 600 mg HS --> 300 mg TID (per patient)  o Warfarin: 10 mg Tuesday and 7.5 mg all other days of the week --> 7.5 mg MWF and 10 mg all other days of the week (per patient and anticoagulation note)     Additional Information:    Patient was able to discuss the dosing, frequency, and last times of administration of his medications in detail.    Cefdinir: Started 10-day course of 300 mg BID on 2/5/21 for possible bronchitis.     Warfarin managed by [facility] anticoagulation clinic. INR goal of 2-3 in the setting of LVAD.     Confirmed with patient he takes 6 units of Lantus nightly and uses Humalog 4 times daily with meals on a sliding scale.     Has recent fill history of both pantoprazole and tramadol though confirms he no longer takes either of these medications.     Prior to Admission medications    Medication Sig Last Dose Taking? Auth Provider   acetaminophen (TYLENOL 8 HOUR ARTHRITIS PAIN) 650 MG CR tablet Take 650 mg by mouth every 6 hours as needed for pain Past Week at PRN Yes Unknown, Entered By History   albuterol (PROAIR HFA) 108 (90 Base) MCG/ACT inhaler Inhale 2 puffs into the lungs every 4 hours as needed for shortness of breath / dyspnea or wheezing  Past Week at PRN Yes Reported, Patient   allopurinol (ZYLOPRIM) 300 MG tablet Take 1 tablet (300 mg) by mouth daily 2/8/2021 at AM Yes Elbert Pettit MD   amiodarone (PACERONE) 400 MG tablet Take 400 mg by mouth daily  2/8/2021 at AM Yes Unknown, Entered By History    aspirin 81 MG chewable tablet 1 tablet (81 mg) by Oral or Feeding Tube route daily 2/8/2021 at AM Yes Madina Laguna PA   bumetanide (BUMEX) 2 MG tablet Take 2 tablets (4 mg) by mouth 2 times daily 2/8/2021 at AM Yes Delisa Montgomery MD   buPROPion (WELLBUTRIN XL) 150 MG 24 hr tablet Take 1 tablet (150 mg) by mouth every morning 2/8/2021 at AM Yes Ricardo Gómez MD   cefdinir (OMNICEF) 300 MG capsule Take 1 capsule (300 mg) by mouth 2 times daily 2/8/2021 at AM Yes Delisa Montgomery MD   Fluticasone-Umeclidin-Vilanterol (TRELEGY ELLIPTA) 100-62.5-25 MCG/INH oral inhaler Inhale 1 puff into the lungs daily 2/8/2021 at AM Yes Ricardo Gómez MD   gabapentin (NEURONTIN) 300 MG capsule Take 300 mg by mouth 3 times daily  2/8/2021 at AM Yes Unknown, Entered By History   hydrALAZINE (APRESOLINE) 25 MG tablet Take 3 tablets (75 mg) by mouth 3 times daily 2/8/2021 at AM Yes Xiomara Carrera MD   insulin glargine (LANTUS SOLOSTAR) 100 UNIT/ML pen Inject 6 Units Subcutaneous At Bedtime 2/7/2021 at HS Yes Jacque Mims MD   insulin lispro (HUMALOG KWIKPEN) 100 UNIT/ML (1 unit dial) KWIKPEN Inject 1-7 Units Subcutaneous 4 times daily Past Week at Unknown time Yes Soraya Marshall APRN CNP   isosorbide dinitrate (ISORDIL) 10 MG tablet Take 1 tablet (10 mg) by mouth 3 times daily 2/8/2021 at Unknown time Yes Xiomara Carrera MD   montelukast (SINGULAIR) 10 MG tablet Take 10 mg by mouth At Bedtime  2/7/2021 at HS Yes Reported, Patient   potassium chloride ER (KLOR-CON M) 20 MEQ CR tablet Take 3 tablets (60 mEq) by mouth daily 2/8/2021 at AM Yes Xiomara Carrera MD   predniSONE (DELTASONE) 5 MG tablet Take 1 tablet (5 mg) by mouth daily 2/8/2021 at AM Yes Elbert Pettit MD   warfarin ANTICOAGULANT (COUMADIN) 5 MG tablet Take 7.5 mg (5 mg x 1.5) by mouth every Monday, Wednesday, Friday and 10 mg (5 mg x 2) all other days of the week, or as directed by INR clinic. 2/7/2021 at PM  Yes Unknown, Entered By History   zinc sulfate (ZINCATE) 220 (50 Zn) MG capsule Take 220 mg by mouth 2 times daily 2/8/2021 at AM Yes Unknown, Entered By History   ACE/ARB/ARNI NOT PRESCRIBED (INTENTIONAL) ACE & ARB not prescribed  Not a med at Rose Morales MD   blood glucose monitoring (ONE TOUCH ULTRA 2) meter device kit Use to test blood sugar four times daily or as directed. Not a med at Soraya Amaya APRN CNP   blood glucose VI test strip Use to test blood sugar four times daily or as directed. Not a med at Soraya Amaya APRN CNP   cyanocobalamin (VITAMIN B12) 1000 MCG/ML injection Inject 1,000 mcg into the muscle every 30 days More than a month at Unknown time  Reported, Patient   insulin pen needle (32G X 4 MM) 32G X 4 MM miscellaneous Use four pen needles daily or as directed. Not a med at Soraya Amaya APRN CNP   metolazone (ZAROXOLYN) 2.5 MG tablet Take 1 tablet (2.5 mg) by mouth as needed (take ONLY as directed by VAD team) More than a month at Delisa Santacruz MD     Date completed: 02/09/21    Medication history completed by: Chase Tolentino, [place]

## 2021-02-09 NOTE — PROGRESS NOTES
Neuro: A&Ox4.   Cardiac: SR 60's-70's w/ 1st AVB, BBB. LVAD #s WDL. VSS.AM K=3.3 replaced at end of NOC shift, recheck 4.3, on Bumex po. Cr 1.43. INR 2.32, Coumadin 10 mg tonight.  Respiratory: Sating upper 90's on RA.  GI/: urine output 1425ml this shift. Last BM PM's 2/07/21, denies problem.  Diet/appetite: Tolerating Cardiac diet. Eating well.  & 95, no Correction Novolog required.  Activity:  Up independently, up to BR,  in halls.  Pain: Bilateral hands/wrists & Right foot, Tylenol increased to ES dosing, not enough relief, started  Oxycodone 5 mg this afternoon. Using warm packs, on Allopurinol and Gabapentin scheduled.  Skin: No new deficits noted.  LDA's: PIV, IV pump, Tele, LVAD   Refer to flow sheets for full assessments, VS, FSG, labs etc.Iron studies done, then  given dose of Iron Sucrose  Plan: Continue with POC. Notify primary team with changes.

## 2021-02-09 NOTE — PROGRESS NOTES
D: saw patient at bedside for routine VAD Coordinator rounding. No VAD related questions or concerns at this time.  I: Discussed POC and provided support and listened to patient and care giver's thoughts and concerns.  P: Continue to follow patient and address any questions or concerns patient and or caregiver may have.

## 2021-02-10 ENCOUNTER — APPOINTMENT (OUTPATIENT)
Dept: OCCUPATIONAL THERAPY | Facility: CLINIC | Age: 66
DRG: 001 | End: 2021-02-10
Attending: NURSE PRACTITIONER
Payer: COMMERCIAL

## 2021-02-10 LAB
ALBUMIN SERPL-MCNC: 3.3 G/DL (ref 3.4–5)
ALP SERPL-CCNC: 108 U/L (ref 40–150)
ALT SERPL W P-5'-P-CCNC: 47 U/L (ref 0–70)
ANION GAP SERPL CALCULATED.3IONS-SCNC: 5 MMOL/L (ref 3–14)
AST SERPL W P-5'-P-CCNC: 38 U/L (ref 0–45)
BILIRUB DIRECT SERPL-MCNC: 0.2 MG/DL (ref 0–0.2)
BILIRUB SERPL-MCNC: 0.6 MG/DL (ref 0.2–1.3)
BUN SERPL-MCNC: 39 MG/DL (ref 7–30)
CALCIUM SERPL-MCNC: 8.5 MG/DL (ref 8.5–10.1)
CELL TYPE AUTO: NORMAL
CHANNELSHIFTAUTOB1: -70
CHANNELSHIFTAUTOT1: -34
CHLORIDE SERPL-SCNC: 103 MMOL/L (ref 94–109)
CO2 SERPL-SCNC: 29 MMOL/L (ref 20–32)
CREAT SERPL-MCNC: 1.67 MG/DL (ref 0.66–1.25)
CROSSMATCHDATEAUTO: NORMAL
DONOR AUTO: NORMAL
DONORCELLDATE AUTO: NORMAL
GFR SERPL CREATININE-BSD FRML MDRD: 43 ML/MIN/{1.73_M2}
GLUCOSE BLDC GLUCOMTR-MCNC: 86 MG/DL (ref 70–99)
GLUCOSE BLDC GLUCOMTR-MCNC: 88 MG/DL (ref 70–99)
GLUCOSE BLDC GLUCOMTR-MCNC: 88 MG/DL (ref 70–99)
GLUCOSE BLDC GLUCOMTR-MCNC: 90 MG/DL (ref 70–99)
GLUCOSE BLDC GLUCOMTR-MCNC: 96 MG/DL (ref 70–99)
GLUCOSE SERPL-MCNC: 88 MG/DL (ref 70–99)
INR PPP: 2.19 (ref 0.86–1.14)
MAGNESIUM SERPL-MCNC: 2.5 MG/DL (ref 1.6–2.3)
POS CUT OFF AUTO B: >93
POS CUT OFF AUTO T: >79
POTASSIUM SERPL-SCNC: 3.6 MMOL/L (ref 3.4–5.3)
PROT SERPL-MCNC: 6.6 G/DL (ref 6.8–8.8)
RESULT AUTO B1: NORMAL
RESULT AUTO T1: NORMAL
SA1 CELL: NORMAL
SA1 COMMENTS: NORMAL
SA1 HI RISK ABY: NORMAL
SA1 MOD RISK ABY: NORMAL
SA1 TEST METHOD: NORMAL
SA2 CELL: NORMAL
SA2 COMMENTS: NORMAL
SA2 HI RISK ABY UA: NORMAL
SA2 MOD RISK ABY: NORMAL
SA2 TEST METHOD: NORMAL
SERUM DATE AUTO B1: NORMAL
SERUM DATE AUTO T1: NORMAL
SODIUM SERPL-SCNC: 137 MMOL/L (ref 133–144)
TESTMETHODAUTO: NORMAL
TREATMENT AUTO B1: NORMAL
TREATMENT AUTO T1: NORMAL
UNACCEPTABLE ANTIGEN: NORMAL
UNOS CPRA: 0

## 2021-02-10 PROCEDURE — 97165 OT EVAL LOW COMPLEX 30 MIN: CPT | Mod: GO

## 2021-02-10 PROCEDURE — 85610 PROTHROMBIN TIME: CPT | Performed by: STUDENT IN AN ORGANIZED HEALTH CARE EDUCATION/TRAINING PROGRAM

## 2021-02-10 PROCEDURE — 80048 BASIC METABOLIC PNL TOTAL CA: CPT | Performed by: STUDENT IN AN ORGANIZED HEALTH CARE EDUCATION/TRAINING PROGRAM

## 2021-02-10 PROCEDURE — 250N000013 HC RX MED GY IP 250 OP 250 PS 637: Performed by: INTERNAL MEDICINE

## 2021-02-10 PROCEDURE — 36415 COLL VENOUS BLD VENIPUNCTURE: CPT | Performed by: NURSE PRACTITIONER

## 2021-02-10 PROCEDURE — 214N000001 HC R&B CCU UMMC

## 2021-02-10 PROCEDURE — 999N001017 HC STATISTIC GLUCOSE BY METER IP

## 2021-02-10 PROCEDURE — 250N000013 HC RX MED GY IP 250 OP 250 PS 637: Performed by: STUDENT IN AN ORGANIZED HEALTH CARE EDUCATION/TRAINING PROGRAM

## 2021-02-10 PROCEDURE — 258N000003 HC RX IP 258 OP 636: Performed by: NURSE PRACTITIONER

## 2021-02-10 PROCEDURE — 93750 INTERROGATION VAD IN PERSON: CPT | Performed by: NURSE PRACTITIONER

## 2021-02-10 PROCEDURE — 999N000147 HC STATISTIC PT IP EVAL DEFER: Performed by: PHYSICAL THERAPIST

## 2021-02-10 PROCEDURE — 97110 THERAPEUTIC EXERCISES: CPT | Mod: GO

## 2021-02-10 PROCEDURE — 80076 HEPATIC FUNCTION PANEL: CPT | Performed by: NURSE PRACTITIONER

## 2021-02-10 PROCEDURE — 36415 COLL VENOUS BLD VENIPUNCTURE: CPT | Performed by: STUDENT IN AN ORGANIZED HEALTH CARE EDUCATION/TRAINING PROGRAM

## 2021-02-10 PROCEDURE — 83735 ASSAY OF MAGNESIUM: CPT | Performed by: STUDENT IN AN ORGANIZED HEALTH CARE EDUCATION/TRAINING PROGRAM

## 2021-02-10 PROCEDURE — 250N000013 HC RX MED GY IP 250 OP 250 PS 637: Performed by: NURSE PRACTITIONER

## 2021-02-10 PROCEDURE — 99232 SBSQ HOSP IP/OBS MODERATE 35: CPT | Mod: 25 | Performed by: NURSE PRACTITIONER

## 2021-02-10 PROCEDURE — 250N000011 HC RX IP 250 OP 636: Performed by: NURSE PRACTITIONER

## 2021-02-10 RX ORDER — POTASSIUM CHLORIDE 750 MG/1
20 TABLET, EXTENDED RELEASE ORAL ONCE
Status: COMPLETED | OUTPATIENT
Start: 2021-02-10 | End: 2021-02-10

## 2021-02-10 RX ORDER — AMOXICILLIN 250 MG
1 CAPSULE ORAL AT BEDTIME
Status: DISCONTINUED | OUTPATIENT
Start: 2021-02-10 | End: 2021-05-06 | Stop reason: DRUGHIGH

## 2021-02-10 RX ORDER — POLYETHYLENE GLYCOL 3350 17 G/17G
17 POWDER, FOR SOLUTION ORAL DAILY
Status: DISCONTINUED | OUTPATIENT
Start: 2021-02-10 | End: 2021-03-11

## 2021-02-10 RX ORDER — BUMETANIDE 2 MG/1
4 TABLET ORAL 2 TIMES DAILY
Status: DISCONTINUED | OUTPATIENT
Start: 2021-02-10 | End: 2021-02-12

## 2021-02-10 RX ORDER — WARFARIN SODIUM 7.5 MG/1
7.5 TABLET ORAL
Status: COMPLETED | OUTPATIENT
Start: 2021-02-10 | End: 2021-02-10

## 2021-02-10 RX ADMIN — DOCUSATE SODIUM 50 MG AND SENNOSIDES 8.6 MG 1 TABLET: 8.6; 5 TABLET, FILM COATED ORAL at 21:54

## 2021-02-10 RX ADMIN — FLUTICASONE FUROATE AND VILANTEROL TRIFENATATE 1 PUFF: 100; 25 POWDER RESPIRATORY (INHALATION) at 08:16

## 2021-02-10 RX ADMIN — HYDRALAZINE HYDROCHLORIDE 75 MG: 25 TABLET, FILM COATED ORAL at 19:44

## 2021-02-10 RX ADMIN — GABAPENTIN 300 MG: 300 CAPSULE ORAL at 19:44

## 2021-02-10 RX ADMIN — BUMETANIDE 4 MG: 2 TABLET ORAL at 08:16

## 2021-02-10 RX ADMIN — OXYCODONE HYDROCHLORIDE 5 MG: 5 TABLET ORAL at 15:39

## 2021-02-10 RX ADMIN — ALLOPURINOL 300 MG: 300 TABLET ORAL at 08:16

## 2021-02-10 RX ADMIN — IRON SUCROSE 200 MG: 20 INJECTION, SOLUTION INTRAVENOUS at 11:09

## 2021-02-10 RX ADMIN — ISOSORBIDE DINITRATE 10 MG: 10 TABLET ORAL at 14:16

## 2021-02-10 RX ADMIN — GABAPENTIN 300 MG: 300 CAPSULE ORAL at 14:16

## 2021-02-10 RX ADMIN — ACETAMINOPHEN 975 MG: 325 TABLET, FILM COATED ORAL at 09:21

## 2021-02-10 RX ADMIN — BUMETANIDE 4 MG: 2 TABLET ORAL at 15:42

## 2021-02-10 RX ADMIN — POLYETHYLENE GLYCOL 3350 17 G: 17 POWDER, FOR SOLUTION ORAL at 11:09

## 2021-02-10 RX ADMIN — UMECLIDINIUM 1 PUFF: 62.5 AEROSOL, POWDER ORAL at 08:17

## 2021-02-10 RX ADMIN — MONTELUKAST 10 MG: 10 TABLET, FILM COATED ORAL at 21:54

## 2021-02-10 RX ADMIN — ASPIRIN 81 MG CHEWABLE TABLET 81 MG: 81 TABLET CHEWABLE at 08:15

## 2021-02-10 RX ADMIN — ISOSORBIDE DINITRATE 10 MG: 10 TABLET ORAL at 08:15

## 2021-02-10 RX ADMIN — HYDRALAZINE HYDROCHLORIDE 75 MG: 25 TABLET, FILM COATED ORAL at 08:15

## 2021-02-10 RX ADMIN — ACETAMINOPHEN 975 MG: 325 TABLET, FILM COATED ORAL at 03:17

## 2021-02-10 RX ADMIN — HYDRALAZINE HYDROCHLORIDE 75 MG: 25 TABLET, FILM COATED ORAL at 14:16

## 2021-02-10 RX ADMIN — OXYCODONE HYDROCHLORIDE 5 MG: 5 TABLET ORAL at 03:17

## 2021-02-10 RX ADMIN — ACETAMINOPHEN 975 MG: 325 TABLET, FILM COATED ORAL at 23:21

## 2021-02-10 RX ADMIN — CEFDINIR 300 MG: 300 CAPSULE ORAL at 08:15

## 2021-02-10 RX ADMIN — OXYCODONE HYDROCHLORIDE 5 MG: 5 TABLET ORAL at 23:22

## 2021-02-10 RX ADMIN — Medication 10 MG: at 22:33

## 2021-02-10 RX ADMIN — ACETAMINOPHEN 975 MG: 325 TABLET, FILM COATED ORAL at 15:39

## 2021-02-10 RX ADMIN — CEFDINIR 300 MG: 300 CAPSULE ORAL at 19:48

## 2021-02-10 RX ADMIN — OXYCODONE HYDROCHLORIDE 5 MG: 5 TABLET ORAL at 09:21

## 2021-02-10 RX ADMIN — WARFARIN SODIUM 7.5 MG: 7.5 TABLET ORAL at 17:51

## 2021-02-10 RX ADMIN — ISOSORBIDE DINITRATE 10 MG: 10 TABLET ORAL at 19:44

## 2021-02-10 RX ADMIN — POTASSIUM CHLORIDE 20 MEQ: 750 TABLET, EXTENDED RELEASE ORAL at 06:53

## 2021-02-10 RX ADMIN — GABAPENTIN 300 MG: 300 CAPSULE ORAL at 08:15

## 2021-02-10 RX ADMIN — INSULIN GLARGINE 6 UNITS: 100 INJECTION, SOLUTION SUBCUTANEOUS at 22:30

## 2021-02-10 RX ADMIN — AMIODARONE HYDROCHLORIDE 200 MG: 200 TABLET ORAL at 08:15

## 2021-02-10 RX ADMIN — POTASSIUM CHLORIDE 60 MEQ: 1500 TABLET, EXTENDED RELEASE ORAL at 08:16

## 2021-02-10 RX ADMIN — BUPROPION HYDROCHLORIDE 150 MG: 150 TABLET, EXTENDED RELEASE ORAL at 08:16

## 2021-02-10 ASSESSMENT — ACTIVITIES OF DAILY LIVING (ADL)
ADLS_ACUITY_SCORE: 13
ADLS_ACUITY_SCORE: 12
ADLS_ACUITY_SCORE: 12
ADLS_ACUITY_SCORE: 13
ADLS_ACUITY_SCORE: 12
ADLS_ACUITY_SCORE: 13

## 2021-02-10 ASSESSMENT — MIFFLIN-ST. JEOR: SCORE: 1758.34

## 2021-02-10 NOTE — PROCEDURES
The patient's HeartMate LVAD was interrogated 2/10/2021  * Speed 9600 rpm   * Pulsatility index 3.2-5  * Power 6.4-7 Manuel   * Flow 6.2-6.9 L/minute   Fluid status: euvolemic   Alarms were reviewed, and notable for several PI events per baseline, some with speed drops.   The driveline exit site was inspected, dressing cdi.   All external components were inspected and showed no evidence of damage or malfunction, none replaced.   No changes to VAD settings made

## 2021-02-10 NOTE — PROGRESS NOTES
Neuro: A&Ox4.   Cardiac: SR 60's-70's w/ 1st AVB, BBB. VSS. LVAD #s WDL. On Bumex 4 mg po twice a day.AM K+ 3.6 replaced on Nocs, also received Kdur 60 meq daily dose., Mag 2.5, Cr 1.67. INR 2.15, Coumadin 7.5 mg tonight.   Respiratory: Sating WDL on RA.  GI/: Adequate urine output. No BM sice 2/07/21, order obtained for Miralax and Senokot, no BM yet.  Diet/appetite: Tolerating * diet. Eating well.  Activity:  Up independently, up to BR and in halls.  Pain: At acceptable level on current regimen, Oxycodone 5 mg po, Tylenol  mg and warm packs for (B) wrist/hand discomfort. Ortho consulted, planning for Cortisone injections tomorrow.   Skin: No new deficits noted.  LDA's: PIV, IV pump, Tele, LVAD  Received another dose IV Iron Sucrose.   Refer to flow sheets for full assessments, VSA, labs, FSG etc.  Plan: Continue with POC. Notify primary team with changes.

## 2021-02-10 NOTE — PLAN OF CARE
Neuro: A&Ox4.   Cardiac: Afebrile, VSS. HM2 without alarms.   Respiratory: RA during day. Home cpap at night.  GI/: Voiding spontaneously. No BM this shift.   Diet/appetite: Good oral intake. BGs WNL. Denies nausea, no emesis today.   Activity: Up ad francisco in [place].    Pain: . Primarily bilateral carpal tunnel pain. Some right foot pain. PRN oxycodone and tylenol given.   Skin: Intact with transparent driveline dressing c/d/I.  Lines: PIV Sl'd.  Replacements: Am labs replaced earlier. Next lab draw in AM.

## 2021-02-10 NOTE — CONSULTS
Transplant Admission Psychosocial Assessment    Patient Name: Jim Willingham  : 1955  Age: 65 year old  MRN: [id number]  Date of Initial Social Work Transplant Evaluation: 2020    Pt admitted for heart transplant status 2 listing. Pt is s/p LVAD implant 2016  Met with pt to update psychosocial assessment and provide ongoing education about SW role while inpatient, and continue discussion of expectations/requirements, caregiver needs and follow up needs post-transplant.     Presenting Information   Living Situation: Pt lives with his spouse, Cate, and adopted great granddaughter, Graham  If not local, plans for short term stay:  Pt is local   Previous Functional Status: Pt independent w/ ADLs and ambulation. 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  Cultural/Language/Spiritual Considerations: none     Support System  Primary Support Person Pt's wife, Cate  Other support:  Pt's sister-lives in [place]  Plan for support in immediate post-transplant period: Pt's wife will provide 24hr caregiver support    Health Care Directive  Decision Maker: Pt   Alternate Decision Maker: Current [facility] lists pt's sister as primary and pt's wife as secondary-pt wants to change this to wife being primary  Health Care Directive: Provided Copy-pt anticipates having HCD done tomorrow and writer will get virtual notary    Mental Health/Coping:   History of Mental Health: Anxiety  History of Chemical Health: Yes : Pt with hx of heavy alcohol use but quite in . Pt quite when his wife went to CD treatment. Pt has never been to CD treatment. Pt does endorse drinking a Heineken 0.0 every once in awhile. Pt has hx of marijuana use but reports he had not used for several years prior the LVAD.   Current status: No CD or mental health concerns.   Coping: Pt coping appropriately with prospect of long hospitalization as he awaits heart transplant. Pt is engaged and openly talks with writer about his feelings.    Services Needed/Recommended: Pt is connected with outpatient therapist.     Financial   Income: SSD, great granddaughters SSDI, adoption assistance   Impact of transplant on income: minimal impact anticipated   Insurance and medication coverage: Yes   Financial concerns: None   Resources needed: None currently    Education provided by SW: Social Work role inpatient setting, availability of Heart Transplant and LVAD virtual support groups, visitor policy, transplant teaching     Assessment and recommendations and plan:      Pt admitted for heart transplant list and is currently listed as status 2E. Pt is in good spirits and coping appropriately to hospitalization. Pt feels he is in a good place overall and eager to receive a heart. Last year he and his wife were having marital conflict, but this has been resolved and pt is feeling good about their relationship. He acknowledges that he is going to miss his great granddaughter, but he was able to spend special time with her prior to coming in. We discussed common emotional concerns that can arise from prolonged hospitalizations. 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 Pt has no current concerns with mood or mental health and is aware that writer will continue to assess throughout hospitalization. Encouraged pt to get involved with the heart transplant virtual support group and he will think about this.     Pt is redoing his HCD and will have this ready to be notarized tomorrow. Writer will check-in with pt tomorrow to get it notarized.

## 2021-02-10 NOTE — PLAN OF CARE
PT 6C: PT orders received. Per discussion with OT, pt moving IND and without acute PT needs. PT orders will be completed, OT will continue to follow while inpatient for cardiac rehab. Please re-order PT if new needs arise.

## 2021-02-10 NOTE — PROGRESS NOTES
"   02/10/21 0800   Quick Adds   Type of Visit Initial Occupational Therapy Evaluation   Living Environment   People in home grandchild(lidia);spouse  (6 year old who he adopted)   Current Living Arrangements house   Home Accessibility stairs to enter home;stairs within home   Number of Stairs, Main Entrance 1   Stair Railings, Main Entrance none   Number of Stairs, Within Home, Primary 8   Stair Railings, Within Home, Primary railing on right side (ascending)   Transportation Anticipated car, drives self;family or friend will provide   Living Environment Comments Pt lives in split level home w/wife, sister, and 6 yr old grandchild. Pt is retired. Wife works as an RT at a children's hospital. Pt also worked as an RT for 26 years. Spends most of the time on main level. Tub shower, showers infrequently   Self-Care   Usual Activity Tolerance good   Current Activity Tolerance moderate   Regular Exercise No   Equipment Currently Used at Home cane, quad   Activity/Exercise/Self-Care Comment Granddaughter keeps him active. Uses cane for gout flare ups occasionally. Otherwise ambulates without AD.    Disability/Function   Hearing Difficulty or Deaf no   Wear Glasses or Blind no   Vision Management reading glasses   Concentrating, Remembering or Making Decisions Difficulty other (see comments)  (\"not what it used to be\" but no serious concerns)   Difficulty Communicating no   Difficulty Eating/Swallowing no   Walking or Climbing Stairs Difficulty other (see comments)  (Fatigues quickly)   Dressing/Bathing Difficulty no   Dressing/Bathing Management Sponge baths normally 2/2 easier LVAD management   Toileting issues no   Doing Errands Independently Difficulty (such as shopping) other (see comments)   Errands Management Covid impacts ability to go out, has groceries delivered   Fall history within last six months no   Change in Functional Status Since Onset of Current Illness/Injury yes   General Information   Onset of " "Illness/Injury or Date of Surgery 02/08/21   Referring Physician Delisa Montgomery   Patient/Family Therapy Goal Statement (OT) Get in shape before heart transplant   Additional Occupational Profile Info/Pertinent History of Current Problem Jim Willingham is a 65 year old male with history of NICM EF 20% c/b VT s/p CRT-D s/p HMII LVAD 6/19/2017 originally intended as destination therapy 2/2 obesity however with recent weight loss now candidate for transplantation. He is admitted for worsening functional status and renal function concerning for worsening heart failure. He is now listed status 2E for transplant, extension due 2/22/21.   Heart Disease Risk Factors Race;Gender;Medical history;Overweight   General Observations and Info Activity: Ambulate, Up ad francisco   Cognitive Status Examination   Orientation Status orientation to person, place and time   Cognitive Status Comments Cognition appears grossly intact   Visual Perception   Visual Impairment/Limitations WNL   Sensory   Sensory Comments N/T in B distal UE 2/2 carpal tunnel (baseline)   Pain Assessment   Patient Currently in Pain Yes, see Vital Sign flowsheet  (\"no more than usual\")   Range of Motion Comprehensive   General Range of Motion no range of motion deficits identified;bilateral upper extremity ROM WNL   Strength Comprehensive (MMT)   Comment, General Manual Muscle Testing (MMT) Assessment UE shoulder and elbow flexion/extension 4+/5   Coordination   Upper Extremity Coordination No deficits were identified   Instrumental Activities of Daily Living (IADL)   Previous Responsibilities meal prep;housekeeping;medication management;finances;driving;   IADL Comments No vacuum because of LVAD, orders groceries. Wife able to assist with some IADLs PRN.    Clinical Impression   Criteria for Skilled Therapeutic Interventions Met (OT) yes   OT Diagnosis Decreased ADL/IADL I   OT Problem List-Impairments impacting ADL problems related to;activity " tolerance impaired;other (see comments);sensation;strength  (risk of deconditioning/weakness w/prolonged LOS)   Assessment of Occupational Performance 3-5 Performance Deficits   Identified Performance Deficits home management, , showering   Planned Therapy Interventions (OT) progressive activity/exercise;risk factor education;home program guidelines;strengthening   Clinical Decision Making Complexity (OT) low complexity   Therapy Frequency (OT) 1x/week   Predicted Duration of Therapy 3 weeks   Risk & Benefits of therapy have been explained evaluation/treatment results reviewed;care plan/treatment goals reviewed;risks/benefits reviewed;participants voiced agreement with care plan;patient   Comment-Clinical Impression Pt would benefit from skilled OT/CR to address the above deficits, see daily flowsheet for details on treatement provided   OT Discharge Planning    OT Discharge Recommendation (DC Rec) Home with assist   OT Rationale for DC Rec Pt below baseline for aerobic endurance necessary for ADL/IADL I. Recommend A with heavy IADLs if pt were to return home at present   OT Brief overview of current status  May change pending medical intervention   Total Evaluation Time (Minutes)   Total Evaluation Time (Minutes) 15

## 2021-02-10 NOTE — PROGRESS NOTES
[facility]   Cardiology II Service / Advanced Heart Failure  Daily Progress Note      Patient: Jim Willingham  MRN: [id number]  Admission Date: 2/8/2021  Hospital Day # 2    Assessment and Plan: Jim Willingham is a 65 year old male with history of NICM EF 20% c/b VT s/p CRT-D s/p HMII LVAD 6/19/2017 originally intended as destination therapy 2/2 obesity however with recent weight loss now candidate for transplantation. He is admitted for worsening functional status and renal function concerning for worsening heart failure. He is now listed status 2E for transplant, extension due 2/22/21.    Today's Plan:  -continue current diuretic, may decrease tomorrow  -ortho consult for steroid injection of wrists    #Chronic systolic heart failure 2/2 NICM (EF 20%)   #s/p HM II LVAD placement (6/19/17) as DT (obesity)  #Listed status 2E for transplant due to multiple LVAD complications  Stage D. NYHA class III. TTE 1/8/21 at 9400rpm, EF<30%m LVIDd 6.8cm, at least mildly reduced RV function, AoV closed without AI, mild-mod eccentric MR, dilated IVC without collapse.     Fluid status: euvolemic, continue bumetanide 4 mg po BID  BB: deferred due to recent decompensations and low cardiac output  ACEi/ARB/Afterload reduction: hydralazine 50 TID, isosorbide dinitrate 10 mg TID   Antiplatelet: ASA 81 mg   Anticoagulation: warfarin dosing per pharmacy, INR goal 2-3, INR 2.1 today  SCD: dual chamber ICD                #Carpal tunnel, bilateral  Worsening symptoms recently; significant numbness and pain. Relieved temporarily with injection 11/2020. Surgery was discussed prior to admission.  - ortho consult for possible steroid injection while inpatient  - defer surgery for now, will discuss at transplant meeting on Friday whether this could be an option as he waits    #Staph hominis bacteremia  Cleared, no concerns for active infection.   - continue pta cefdinir for suppression    #WALTER on CKD, improved  Admission Cr. 2.02  "<- 2.22. Improved with decreased Bumex as outpatient  - daily BMP     #H/o atrial fibrillation on warfarin  #H/o VT/VF on amiodarone  Currently in NSR.   - continue warfarin as above  - decreased amiodarone 200 mg daily per Dr Knott note from 2/5 given transplant listing     Chronic conditions  #DM type II: glargine 6 unit, medium dose insuline sliding scale  #COPD/Asthma: pta Breo Ellipta, albuterol prn  #Psychiatric condition: pta Bupropion     Diet: 2g Na 2L FR  DVT Prophylaxis: warfarin  Code Status: full code  Lines: DAWSON Wilder, JOSE, NP-C  Advanced Heart Failure/Cardiology II Service  Pager [phone] [facility] [id number]    ================================================================    Subjective/24-Hr Events:   Last 24 hr care team notes reviewed. No tele events. No concerns today. Walked 1.5mi yesterday and felt well. Weights stable, denies shortness of breath.     ROS:  4 point ROS including respiratory, CV, GI and  (other than that noted in the HPI) is negative.     Medications: Reviewed in EPIC.     Physical Exam:   BP 91/78 (BP Location: Left arm)   Pulse 71   Temp 98  F (36.7  C) (Axillary)   Resp 16   Ht 1.727 m (5' 8\")   Wt 98.9 kg (218 lb)   SpO2 98%   BMI 33.15 kg/m      GENERAL: Appears comfortable, in no distress.  HEENT: Eye symmetrical, no discharge or icterus bilaterally. Mucous membranes moist and without lesions.  NECK: Supple, JVD not visible at 90 degrees.   CV: +mechanical LVAD hum.  RESPIRATORY: Respirations regular, even, and unlabored. Lungs CTA throughout.    GI: Soft and non distended with normoactive bowel sounds present in all quadrants. No tenderness, rebound, guarding.   EXTREMITIES: Trace peripheral edema. Non pulsatile.   NEUROLOGIC: Alert and oriented x 3. No focal deficits.   MUSCULOSKELETAL: No joint swelling or tenderness.   SKIN: No jaundice. No rashes or lesions.     Labs:  CMP  Recent Labs   Lab 02/10/21  0539 02/09/21  1008 02/09/21  0518 02/08/21  1659 " 02/05/21  0755     --  137 133 137   POTASSIUM 3.6 4.3 3.3* 3.8 4.2   CHLORIDE 103  --  103 100 100   CO2 29  --  27 28 28   ANIONGAP 5  --  7 5 9   GLC 88  --  103* 188* 161*   BUN 39*  --  35* 37* 47*   CR 1.67*  --  1.43* 1.65* 2.02*   GFRESTIMATED 43*  --  52* 43* 34*   GFRESTBLACK 49*  --  60* 50* 39*   QAMAR 8.5  --  8.4* 8.5 8.4*   MAG 2.5*  --   --   --   --    PROTTOTAL 6.6*  --   --  6.9  --    ALBUMIN 3.3*  --   --  3.7  --    BILITOTAL 0.6  --   --  0.5  --    ALKPHOS 108  --   --  116  --    AST 38  --   --  38  --    ALT 47  --   --  47  --      INR  Recent Labs   Lab 02/10/21  0539 02/09/21  0518 02/08/21  1822 02/05/21  0755   INR 2.19* 2.32* 2.12* 2.67*       Time/Communication  I personally spent a total of 25 minutes. Of that 15 minutes was counseling/coordination of patient's care. Plan of care discussed with patient. See my note above for details.    Patient discussed with Dr. Sears.

## 2021-02-10 NOTE — PLAN OF CARE
D: Patient admitted 2/8 for worsening functional status and renal function concerning for worsening HF. Now listed status 2E for heart txp. Hx. NICM, VT s/p CRT-D, s/p LVAD HM 2 6/2017 as DT (however w/recent weight loss now candidate for transplant), COPD, CKD, DM2.  I/A: A&Ox4. VSS on RA. Afebrile. SR 60s-70s. LVAD #s wnl/stable/BL for patient, no alarms. C/o BL wrist soreness d/t carpel tunnel partially relieved by prn tylenol and oxycodone. BG stable overnight. Adequate uop. Up ad francisco. Frequently ambulating in halls. Appeared to rest comfortably overnight.  P: Continue to monitor and notify Cards 2 with questions/concerns.

## 2021-02-11 ENCOUNTER — DOCUMENTATION ONLY (OUTPATIENT)
Dept: OTHER | Facility: CLINIC | Age: 66
End: 2021-02-11

## 2021-02-11 LAB
ANION GAP SERPL CALCULATED.3IONS-SCNC: 5 MMOL/L (ref 3–14)
BUN SERPL-MCNC: 37 MG/DL (ref 7–30)
CALCIUM SERPL-MCNC: 8.5 MG/DL (ref 8.5–10.1)
CHLORIDE SERPL-SCNC: 103 MMOL/L (ref 94–109)
CO2 SERPL-SCNC: 29 MMOL/L (ref 20–32)
CREAT SERPL-MCNC: 1.6 MG/DL (ref 0.66–1.25)
GFR SERPL CREATININE-BSD FRML MDRD: 45 ML/MIN/{1.73_M2}
GLUCOSE BLDC GLUCOMTR-MCNC: 144 MG/DL (ref 70–99)
GLUCOSE BLDC GLUCOMTR-MCNC: 189 MG/DL (ref 70–99)
GLUCOSE BLDC GLUCOMTR-MCNC: 68 MG/DL (ref 70–99)
GLUCOSE BLDC GLUCOMTR-MCNC: 82 MG/DL (ref 70–99)
GLUCOSE BLDC GLUCOMTR-MCNC: 86 MG/DL (ref 70–99)
GLUCOSE BLDC GLUCOMTR-MCNC: 93 MG/DL (ref 70–99)
GLUCOSE BLDC GLUCOMTR-MCNC: 97 MG/DL (ref 70–99)
GLUCOSE SERPL-MCNC: 91 MG/DL (ref 70–99)
INR PPP: 2.22 (ref 0.86–1.14)
MAGNESIUM SERPL-MCNC: 2.6 MG/DL (ref 1.6–2.3)
POTASSIUM SERPL-SCNC: 4.1 MMOL/L (ref 3.4–5.3)
SODIUM SERPL-SCNC: 137 MMOL/L (ref 133–144)

## 2021-02-11 PROCEDURE — 83735 ASSAY OF MAGNESIUM: CPT | Performed by: STUDENT IN AN ORGANIZED HEALTH CARE EDUCATION/TRAINING PROGRAM

## 2021-02-11 PROCEDURE — 36415 COLL VENOUS BLD VENIPUNCTURE: CPT | Performed by: STUDENT IN AN ORGANIZED HEALTH CARE EDUCATION/TRAINING PROGRAM

## 2021-02-11 PROCEDURE — 250N000013 HC RX MED GY IP 250 OP 250 PS 637: Performed by: NURSE PRACTITIONER

## 2021-02-11 PROCEDURE — 84132 ASSAY OF SERUM POTASSIUM: CPT | Performed by: STUDENT IN AN ORGANIZED HEALTH CARE EDUCATION/TRAINING PROGRAM

## 2021-02-11 PROCEDURE — 99222 1ST HOSP IP/OBS MODERATE 55: CPT | Performed by: PHYSICIAN ASSISTANT

## 2021-02-11 PROCEDURE — 214N000001 HC R&B CCU UMMC

## 2021-02-11 PROCEDURE — 250N000011 HC RX IP 250 OP 636: Performed by: NURSE PRACTITIONER

## 2021-02-11 PROCEDURE — 258N000003 HC RX IP 258 OP 636: Performed by: NURSE PRACTITIONER

## 2021-02-11 PROCEDURE — 250N000013 HC RX MED GY IP 250 OP 250 PS 637: Performed by: STUDENT IN AN ORGANIZED HEALTH CARE EDUCATION/TRAINING PROGRAM

## 2021-02-11 PROCEDURE — 99232 SBSQ HOSP IP/OBS MODERATE 35: CPT | Mod: 25 | Performed by: NURSE PRACTITIONER

## 2021-02-11 PROCEDURE — 999N001017 HC STATISTIC GLUCOSE BY METER IP

## 2021-02-11 PROCEDURE — 93750 INTERROGATION VAD IN PERSON: CPT | Performed by: NURSE PRACTITIONER

## 2021-02-11 PROCEDURE — 85610 PROTHROMBIN TIME: CPT | Performed by: STUDENT IN AN ORGANIZED HEALTH CARE EDUCATION/TRAINING PROGRAM

## 2021-02-11 PROCEDURE — 80048 BASIC METABOLIC PNL TOTAL CA: CPT | Performed by: STUDENT IN AN ORGANIZED HEALTH CARE EDUCATION/TRAINING PROGRAM

## 2021-02-11 PROCEDURE — 250N000013 HC RX MED GY IP 250 OP 250 PS 637: Performed by: INTERNAL MEDICINE

## 2021-02-11 RX ORDER — WARFARIN SODIUM 10 MG/1
10 TABLET ORAL
Status: COMPLETED | OUTPATIENT
Start: 2021-02-11 | End: 2021-02-11

## 2021-02-11 RX ADMIN — CEFDINIR 300 MG: 300 CAPSULE ORAL at 09:03

## 2021-02-11 RX ADMIN — ALLOPURINOL 300 MG: 300 TABLET ORAL at 07:46

## 2021-02-11 RX ADMIN — ISOSORBIDE DINITRATE 10 MG: 10 TABLET ORAL at 19:57

## 2021-02-11 RX ADMIN — IRON SUCROSE 200 MG: 20 INJECTION, SOLUTION INTRAVENOUS at 10:22

## 2021-02-11 RX ADMIN — ACETAMINOPHEN 975 MG: 325 TABLET, FILM COATED ORAL at 15:47

## 2021-02-11 RX ADMIN — GABAPENTIN 300 MG: 300 CAPSULE ORAL at 19:57

## 2021-02-11 RX ADMIN — OXYCODONE HYDROCHLORIDE 5 MG: 5 TABLET ORAL at 06:29

## 2021-02-11 RX ADMIN — FLUTICASONE FUROATE AND VILANTEROL TRIFENATATE 1 PUFF: 100; 25 POWDER RESPIRATORY (INHALATION) at 07:49

## 2021-02-11 RX ADMIN — BUPROPION HYDROCHLORIDE 150 MG: 150 TABLET, EXTENDED RELEASE ORAL at 07:47

## 2021-02-11 RX ADMIN — BUMETANIDE 4 MG: 2 TABLET ORAL at 07:46

## 2021-02-11 RX ADMIN — GABAPENTIN 300 MG: 300 CAPSULE ORAL at 07:46

## 2021-02-11 RX ADMIN — DOCUSATE SODIUM 50 MG AND SENNOSIDES 8.6 MG 1 TABLET: 8.6; 5 TABLET, FILM COATED ORAL at 21:33

## 2021-02-11 RX ADMIN — ISOSORBIDE DINITRATE 10 MG: 10 TABLET ORAL at 14:17

## 2021-02-11 RX ADMIN — ASPIRIN 81 MG CHEWABLE TABLET 81 MG: 81 TABLET CHEWABLE at 07:46

## 2021-02-11 RX ADMIN — ACETAMINOPHEN 975 MG: 325 TABLET, FILM COATED ORAL at 06:29

## 2021-02-11 RX ADMIN — AMIODARONE HYDROCHLORIDE 200 MG: 200 TABLET ORAL at 07:46

## 2021-02-11 RX ADMIN — Medication 10 MG: at 21:38

## 2021-02-11 RX ADMIN — OXYCODONE HYDROCHLORIDE 5 MG: 5 TABLET ORAL at 21:38

## 2021-02-11 RX ADMIN — OXYCODONE HYDROCHLORIDE 5 MG: 5 TABLET ORAL at 15:47

## 2021-02-11 RX ADMIN — HYDRALAZINE HYDROCHLORIDE 75 MG: 25 TABLET, FILM COATED ORAL at 14:17

## 2021-02-11 RX ADMIN — UMECLIDINIUM 1 PUFF: 62.5 AEROSOL, POWDER ORAL at 07:48

## 2021-02-11 RX ADMIN — HYDRALAZINE HYDROCHLORIDE 75 MG: 25 TABLET, FILM COATED ORAL at 07:47

## 2021-02-11 RX ADMIN — GABAPENTIN 300 MG: 300 CAPSULE ORAL at 14:18

## 2021-02-11 RX ADMIN — BUMETANIDE 4 MG: 2 TABLET ORAL at 15:47

## 2021-02-11 RX ADMIN — HYDRALAZINE HYDROCHLORIDE 75 MG: 25 TABLET, FILM COATED ORAL at 19:57

## 2021-02-11 RX ADMIN — MONTELUKAST 10 MG: 10 TABLET, FILM COATED ORAL at 21:33

## 2021-02-11 RX ADMIN — POTASSIUM CHLORIDE 60 MEQ: 1500 TABLET, EXTENDED RELEASE ORAL at 07:47

## 2021-02-11 RX ADMIN — ISOSORBIDE DINITRATE 10 MG: 10 TABLET ORAL at 07:46

## 2021-02-11 RX ADMIN — ACETAMINOPHEN 975 MG: 325 TABLET, FILM COATED ORAL at 21:38

## 2021-02-11 RX ADMIN — CEFDINIR 300 MG: 300 CAPSULE ORAL at 19:58

## 2021-02-11 RX ADMIN — WARFARIN SODIUM 10 MG: 10 TABLET ORAL at 17:03

## 2021-02-11 RX ADMIN — POLYETHYLENE GLYCOL 3350 17 G: 17 POWDER, FOR SOLUTION ORAL at 07:48

## 2021-02-11 ASSESSMENT — ACTIVITIES OF DAILY LIVING (ADL)
ADLS_ACUITY_SCORE: 12

## 2021-02-11 NOTE — CONSULTS
[facility] Orthopedic Consultation    Jim Willingham MRN# [id number]   Age: 65 year old YOB: 1955   Date of Admission:  2021    Reason for consult: Bilateral Carpal tunnel Syndrome.    Requesting physician: Renu Sears MD              Impression and Recommendation:   The orthopaedic surgery service was consulted by Renu Tyler MD for evaluation and treatment recommendations of bilateral hand numbness and pain.    Impression:  Jim Willingham is a 65 year old left-hand dominant male with complex past medical history including heart failure s/p LVAD awaiting heart transplant, CKD stage 3, COPD, and the followin. Bilateral Carpal tunnel syndrome, right more symptomatic than the left.  2. Right guyons canal syndrome.  3. Left cubital tunnel syndrome.     Orthopedics consulted to discuss treatment options for bilateral carpal tunnel syndrome.      Recomendations: I had a very long conversation with Mr. Willingham regarding his bilateral carpal tunnel syndrome and possible treatment options at this time. We discussed that there are 3 different treatment options for carpal tunnel syndrome including conservative measures of night splinting, steroid injections, or surgery in the form of open carpal tunnel release.  We also discussed that while he does have some intermittent ulnar neuropathy symptoms, and evidence of ulnar neuropathy on EMG from 2017, would recommend deferring treatment for these given his carpal tunnel syndrome is more bothersome and they involve additional procedures, and larger surgical incisions.     Given that the patient had severe bilateral carpal syndrome noted on EMG in 2017, his persistent symptoms, presence of thenar atrophy and minimal relief from steroid injections less than 3 months ago, would recommend surgical intervention in the form of a open carpal tunnel release for definitive treatment.  I did however discuss that given his complex  medical history, a corticosteroid injection could be offered at this time.  I did discuss that the patient might have minimal or short-term relief from this injection, and I would not recommend subsequent injections after this.  In addition I would recommend that surgical intervention in the form of open carpal tunnel release would be delayed for at least 90 days following the steroid injection.    The patient's current complex medical history including hospitalization while awaiting his heart transplant complicates treatment options.  The cardiology/transplant team is concerned about wound healing problems should a heart become available in the immediate postoperative period following a carpal tunnel release due to immunosuppressive therapies.  In addition the patient would be instructed to wait a minimum of 60 days following his heart transplant prior to considering any additional surgical interventions.    I reviewed details of a open carpal tunnel release with the patient and the [place] [facility].  Typically the procedure would be done under local anesthesia without sedation.  The surgical incision would be approximately 1-2 inches in length at the base of the palm. I described the procedure, post-operative protocol, and expected outcomes with the patient.  I also discussed the risks of surgery including but not limited to, bleeding, infection, nerve or vessel damage, wound healing problems, persistent pain, persistent numbness and the possibility for further surgery.    The cardiology and transplant teams will be having a group conference tomorrow morning.  At which time they will discuss what treatment options would be appropriate for this patient.  If open carpal tunnel release is deemed an appropriate treatment option at this time we would proceed with scheduling either a right open carpal tunnel release versus bilateral open carpal tunnel release in the very near future during this hospitalization.  This would  be done at [facility] and under local anesthetic.  I did review this case with Dr. Andrew Brand who was agreeable to perform the procedure.    Should the cardiology/transplant team elect to defer surgical treatment, I would offer the patient bilateral carpal tunnel injections performed at the bedside.  This would be arranged sometime next week.    Please page me directly at [phone] when final plan from cardiology/transplant teams is determined.    EDUARDO BELL PA-C  2/11/2021 12:43 PM  Orthopaedic Surgery  Pager: [phone]    Thank you for allowing me to participate in this patient's care. Please page me at [phone] with any questions/concerns. If there is no response, if it is a weekend, or if it is during evening hours, please page the orthopaedic surgery resident on call.          Chief Complaint:   Bilateral hand pain and numbness.          History of Present Illness:   Jim Willingham is a 65 year old left-hand dominant male with PMH significant for nonischemic cardiomyopathy EF 25% complicated by ventricular tachycardia, s/p CRT-D s/p HMII LVAD 6/19/2017, atrial fibrillation s/p DCCV on amiodarone and warfarin, history of staph hominis bacteremia, CKD stage 3, and COPD who was admitted on 2/8/2021 for decompensated heart failure, now awaiting heart transplant with anticipated lengthy hospital stay. Orthopedics was consulted for bilateral carpal tunnel syndrome.     Patient states that he has had numbness, tingling, and pain in the bilateral thumb, index, middle, and ring fingers for many years.  He had an EMG obtained in 2017 that showed bilateral severe carpal tunnel syndrome, at that time left worse than the right, as well as bilateral ulnar nerve compression -at Guyon's canal on the right and cubital tunnel on the left.    The patient states that late last summer his symptoms started to worsen.  His right hand became worse than his left.  He had pain, numbness, and tingling present at  all times.  He states he got to the point where he was unable to button buttons or find the zipper on his pants.  He was seen in October and started wearing bilateral wrist splints regularly.  He states this provided minimal relief.  He saw Dr. Ag on 11/27/2020 at which time he underwent bilateral carpal tunnel injections via ultrasound.  The patient states that he had approximately 6 weeks of mild relief from these injections.  He states his symptoms did not completely go away but they improved to the point where he was able to find his zipper with his right hand and his symptoms were more intermittent.  For the last month or so his symptoms returned to being constant.  He states that he is absolutely miserable and often wakes up in the middle of the night and so much bilateral hand pain that it almost brings him to tears.  He is on chronic pain meds including oxycodone and gabapentin which have no relief.  Patient has not been regularly wearing his bilateral night splints for a couple months.  Patient does report intermittent numbness and tingling in the bilateral pinkies, with positioning.  This is not constant and does not cause much pain.     The patient saw Dr. Dann Whelan on 1/12/2021 at which time a plan was made to proceed with a right open carpal tunnel release in 5/21.  However patient was placed on the cardiac transplant list and his transplant team elected to cancel this procedure.  The patient is now inpatient waiting for his heart transplant.  The patient reached out to our Hand clinic this week with a request for a consult.  While the patient would be open to carpal tunnel injections, I he is very much interested in having a carpal tunnel release.       History obtained from patient interview and chart review.        Past Medical History:     Past Medical History:   Diagnosis Date     Benign essential hypertension 5/11/2017     Bilateral carpal tunnel syndrome 11/2/2020     Cardiomyopathy,  unspecified (H) 5/8/2017     CKD (chronic kidney disease) stage 3, GFR 30-59 ml/min 5/11/2017     COPD (chronic obstructive pulmonary disease) (H) 11/2/2020     Depression 5/11/2017     Diabetes mellitus (H) 5/11/2017     Gouty arthropathy, chronic, without tophi 11/2/2020     H/O gastric bypass 5/11/2017     ICD (implantable cardioverter-defibrillator), biventricular, in situ 5/11/2017     LVAD (left ventricular assist device) present (H)      Major depression, recurrent, chronic (H) 11/2/2020     NICM (nonischemic cardiomyopathy) (H)/ EF 20% 5/11/2017    ECHO: LVEDd. 7.66 cm, Restrictive pattern , Severe mitral valve regurgitation     CECILIA (obstructive sleep apnea) 5/11/2017     Paroxysmal atrial fibrillation (H) 5/11/2017     Paroxysmal VT (H) 5/11/2017     Rotator cuff tear arthropathy of both shoulders 11/2/2020     Uncomplicated asthma      Vitamin B12 deficiency (non anemic) 5/11/2017             Past Surgical History:     Past Surgical History:   Procedure Laterality Date     ANESTHESIA CARDIOVERSION N/A 5/11/2020    Procedure: ANESTHESIA, FOR CARDIOVERSION @1100;  Surgeon: GENERIC ANESTHESIA PROVIDER;  Location: [facility] OR     CV RIGHT HEART CATH MEASUREMENTS RECORDED N/A 7/24/2019    Procedure: CV RIGHT HEART CATH;  Surgeon: Renu Sears MD;  Location:  HEART CARDIAC CATH LAB     CV RIGHT HEART CATH MEASUREMENTS RECORDED N/A 8/5/2020    Procedure: CV RIGHT HEART CATH;  Surgeon: Nicola Seth MD;  Location:  HEART CARDIAC CATH LAB     CV RIGHT HEART CATH MEASUREMENTS RECORDED N/A 1/7/2021    Procedure: CV RIGHT HEART CATH;  Surgeon: Jac Dover MD;  Location:  HEART CARDIAC CATH LAB     GI SURGERY  2003    [facility]     INSERT VENTRICULAR ASSIST DEVICE LEFT (HEARTMATE II) N/A 6/19/2017    Procedure: INSERT VENTRICULAR ASSIST DEVICE LEFT (HEARTMATE II);  Median Sternotomy Heartmate II Left Ventricular Assist Device Insertion on Pump Oxygenator;  Surgeon: Ronnie Quigley MD;  Location:  OR      ORTHOPEDIC SURGERY  1994    right knee wired     PICC DOUBLE LUMEN PLACEMENT Right 09/23/2020    5FR PICC DL. Length-43cm (1cm out).             Social History:   Former smoker  Denies alcohol or other drug use.           Family History:   No family history of anesthesia, bleeding or clotting complications.           Allergies:     Allergies   Allergen Reactions     Beer Shortness Of Breath     Grass Shortness Of Breath     Ace Inhibitors Cough     Dust Mites Other (See Comments)     Asthma     Mold Other (See Comments)     Asthma     Penicillins Other (See Comments)     Unknown - childhood exposure    Tolerated Zosyn 9/18-9/20/2020     Sulfa Drugs Other (See Comments) and Unknown     Unknown childhood reaction             Medications:   Medication reviewed with patient and in chart.          Review of Systems:   CONSTITUTIONAL:  negative for  fevers, chills, sweats, fatigue, malaise and weight loss  HEENT:  negative for tinnitus, earaches, nasal congestion, epistaxis, sore throat  RESPIRATORY:  negative for wheezing, chest pain and cough.  GASTROINTESTINAL:  negative for nausea, vomiting, diarrhea, constipation, abdominal pain.  GENITOURINARY:  negative for dysuria, nocturia, urinary incontinence and hematuria  INTEGUMENT/BREAST:  negative for rash and skin lesions  HEMATOLOGIC/LYMPHATIC:  negative for easy bruising, bleeding, swelling/edema  ALLERGIC/IMMUNOLOGIC:  negative for recurrent infections and drug reactions  ENDOCRINE: negative for diabetic symptoms including polyuria and polydipsia  MUSCULOSKELETAL: negative for myalgias, arthralgias, joint swelling and muscle weakness  NEUROLOGICAL: negative for headaches, dizziness, gait problems          Physical Exam:     Vitals:    02/11/21 1020 02/11/21 1033 02/11/21 1103 02/11/21 1135   BP: (!) 88/68 93/79 93/82    BP Location: Left arm  Left arm    Pulse:    73   Resp: 16  16    Temp: 97.5  F (36.4  C)  97.8  F (36.6  C)    TempSrc: Oral  Oral    SpO2: 98%   "96%    Weight:       Height:         Vitals: BP 93/82 (BP Location: Left arm)   Pulse 73   Temp 97.8  F (36.6  C) (Oral)   Resp 16   Ht 1.727 m (5' 8\")   Wt 98.9 kg (218 lb)   SpO2 96%   BMI 33.15 kg/m      Physical Exam Adult:  General: Well-nourished, alert, cooperative with exam and in no acute distress  HEENT: Atraumatic, normocephalic, extraocular movements intact, moist mucous membranes, trachea midline  Pulmonary: Unlabored work of breathing, on room air  Cardiovascular: Warm and well-perfused extremities.  Skin: Warm, intact without rashes to the upper extremities, head or neck   Neuro/psych: Oriented to time, place and person. Affect is appropriate    Musculoskeletal:   A focused physical examination of the right upper extremity reveals:   Inspection- no evidence of deformity, malalignment ecchymosis or edema  Palpation- Tenderness to palpation of the thumb CMC.   Neurovascular- Diminished sensation to light touch in the median nerve distribution.  intact light touch sensation and motor to distribution of the ulnar and radial nerves. Brisk capillary refill to the distal fingers.   Range of Motion: Full and symmetric. Able to make a full fist.   Stability: no dislocation, subluxation or laxity  Muscle strength and tone: 5/5 strength EPL, FPL, APB, first dorsal interosseous.      A focused physical examination of the left upper extremity rveals:   Inspection- no evidence of deformity, malalignment ecchymosis or edema  Palpation- No tenderness to palpation.   Neurovascular- Diminished sensation to light touch in the median nerve distribution.  Intact light touch sensation and motor to distribution of the ulnar and radial nerves. Brisk capillary refill to the distal fingers.   Range of Motion: Full and symmetric. Able to make a full fist.   Stability: no dislocation, subluxation or laxity  Muscle strength and tone: 5/5 strength EPL, FPL, APB, first dorsal interosseous.    Special Tests:    Median " nerve  Thenar atrophy: Present bilaterally, right worse than left  Carpal tunnel compression test: Present bilaterally  Phalen's test: Present Bilaterally  Tinel's test:  Present Bilaterally    Ulnar nerve  First dorsal interosseous atrophy:  Absent bilaterally  Cubital tunnel compression with elbow flexion: Absent bilaterally  Tinel's at the elbow: Absent Bilaterally  Nerve subluxation with elbow ROM: Absent Bilaterally.     Tendon: FDS and FDP intact to all fingers. Normal tenodesis effect.     CMC grind: Positive on the right.                       Imaging:   EMG from 10/24/2017: Severe bilateral carpal tunnel syndrome left greater than right.  Severe left ulnar neuropathy at the elbow, right ulnar neuropathy at the wrist.          Laboratory date:   CBC:  Lab Results   Component Value Date    WBC 7.0 02/01/2021    HGB 10.1 (L) 02/01/2021     02/01/2021       BMP:  Lab Results   Component Value Date     02/11/2021    POTASSIUM 4.1 02/11/2021    CHLORIDE 103 02/11/2021    CO2 29 02/11/2021    BUN 37 (H) 02/11/2021    CR 1.60 (H) 02/11/2021    ANIONGAP 5 02/11/2021    QAMAR 8.5 02/11/2021    GLC 91 02/11/2021       Inflammatory Markers:  Lab Results   Component Value Date    WBC 7.0 02/01/2021    CRP 3.5 10/27/2020    SED 10 10/27/2020       Cultures:  No results for input(s): CULT in the last 168 hours.

## 2021-02-11 NOTE — TELEPHONE ENCOUNTER
DIAGNOSIS: Bilateral carpal tunnel syndrome/Ricardo Gómez MD   APPOINTMENT DATE: 4.6.21   NOTES STATUS DETAILS   OFFICE NOTE from referring provider N/A    OFFICE NOTE from other specialist Internal 1.18.21 Dr Angelina Recinos,    1.12.21 Dr Dann Whelan, [facility]  11.27.20 Dr bNa Ag, [facility] Sports Med  11.13.20 Dr Elbert Pettit, [facility] Rheumatology   DISCHARGE SUMMARY from hospital N/A    DISCHARGE REPORT from the ER N/A    OPERATIVE REPORT N/A    MEDICATION LIST Internal    EMG (for Spine) N/A    IMPLANT RECORD/STICKER N/A    LABS     CBC/DIFF Internal    CULTURES N/A    INJECTIONS DONE IN RADIOLOGY N/A    MRI N/A    CT SCAN N/A    XRAYS (IMAGES & REPORTS) N/A    TUMOR     PATHOLOGY  Slides & report N/A

## 2021-02-11 NOTE — PROGRESS NOTES
ANTICOAGULATION FOLLOW-UP CLINIC VISIT    Patient Name:  Jim Willingham  Date:  11/9/2018  Contact Type:  Telephone    SUBJECTIVE:     Patient Findings     Positives No Problem Findings    Comments Spoke to Jim.  Recommended 10mg once a week on Friday.  Patient has an appointment on 11/20.  Adjusted patient's Aspirin dose to 81mg per LVAD protocol.           OBJECTIVE    INR   Date Value Ref Range Status   11/09/2018 2.17 (H) 0.86 - 1.14 Final       ASSESSMENT / PLAN  INR assessment THER    Recheck INR In: 2 WEEKS    INR Location Clinic      Anticoagulation Summary as of 11/9/2018     INR goal 2.0-3.0   Today's INR 2.17   Warfarin maintenance plan 10 mg (5 mg x 2) on Fri; 7.5 mg (5 mg x 1.5) all other days   Full warfarin instructions 10 mg on Fri; 7.5 mg all other days   Weekly warfarin total 55 mg   Plan last modified Brendan Montoya RN (11/9/2018)   Next INR check 11/20/2018   Priority INR   Target end date     Indications   LVAD (left ventricular assist device) present (H) [Z95.811]  Long-term (current) use of anticoagulants [Z79.01] [Z79.01]         Anticoagulation Episode Summary     INR check location     Preferred lab     Send INR reminders to [facility]    Comments HIPPA Forms mailed 6/26/17  Labs drawn either at [facility] or [facility]  LVAD placed 6/19/17   II  ASA 81 mg daily      Anticoagulation Care Providers     Provider Role Specialty Phone number    Delisa Montgomery MD Responsible Cardiology [phone]            See the Encounter Report to view Anticoagulation Flowsheet and Dosing Calendar (Go to Encounters tab in chart review, and find the Anticoagulation Therapy Visit)    Spoke with patient. Gave them their lab results and new warfarin recommendation.  No changes in health, medication, or diet. No missed doses, no falls. No signs or symptoms of bleed or clotting.    Patient had LVAD placed on:   6/19/17  Patient's current Aspirin dose: 81mg daily, adjusted once  therapeutic  LVAD Protocol followed:  Yes.   If Not Followed Explanation:  Brendan Garcia, RN                clear

## 2021-02-11 NOTE — TELEPHONE ENCOUNTER
Patient had long consultation with April Kimble PA-C/ Hand surgery PA while he was inpatient today.  Mihaela Sauer RN

## 2021-02-11 NOTE — PROGRESS NOTES
[facility]   Cardiology II Service / Advanced Heart Failure  Daily Progress Note      Patient: Jim Willingham  MRN: [id number]  Admission Date: 2/8/2021  Hospital Day # 3    Assessment and Plan: Jim Willingham is a 65 year old male with history of NICM EF 20% c/b VT s/p CRT-D s/p HMII LVAD 6/19/2017 originally intended as destination therapy 2/2 obesity however with recent weight loss now candidate for transplantation. He is admitted for worsening functional status and renal function concerning for worsening heart failure. He is now listed status 2E for transplant, extension due 2/22/21.    Today's Plan:  -continue current diuretic  -ortho following, awaiting plan, will discuss tomorrow at transplant meeting    #Chronic systolic heart failure 2/2 NICM (EF 20%)   #s/p HM II LVAD placement (6/19/17) as DT (obesity)  #Listed status 2E for transplant due to multiple LVAD complications  Stage D. NYHA class III. TTE 1/8/21 at 9400rpm, EF<30%m LVIDd 6.8cm, at least mildly reduced RV function, AoV closed without AI, mild-mod eccentric MR, dilated IVC without collapse.     Fluid status: euvolemic, continue bumetanide 4 mg po BID, decrease if Cr increased  BB: deferred due to recent decompensations and low cardiac output  ACEi/ARB/Afterload reduction: hydralazine 50 TID, isosorbide dinitrate 10 mg TID   Antiplatelet: ASA 81 mg   Anticoagulation: warfarin dosing per pharmacy, INR goal 2-3, INR 2.2 today  SCD: dual chamber ICD                #Carpal tunnel, bilateral R>L  Worsening symptoms recently; significant numbness and pain. E/o thenar atrophy. Relieved temporarily with injection 11/2020. Surgery was discussed prior to admission.  - ortho consulted for possible steroid injection while inpatient, recommending minimally invasive surgery for more permanent treatment   - will discuss at transplant meeting on Friday whether this could be an option as he waits, concern would be wound healing post-transplant/high  "dose IS   - would need lower INR with heparin bridge if surgery pursued    #Staph hominis bacteremia  Cleared, no concerns for active infection.   - continue pta cefdinir for suppression    #WALTER on CKD, improved  Admission Cr. 2 <- 2.22. Improved with decreased Bumex as outpatient. Cr stable 1.6.  - daily BMP     #H/o atrial fibrillation on warfarin  #H/o VT/VF on amiodarone  Currently in NSR.   - continue warfarin as above  - continue amiodarone 200 mg daily (decreased per Dr Knott note from 2/5 given transplant listing)     Chronic conditions  #DM type II: glargine 6 unit, medium dose insulin sliding scale  #COPD/Asthma: pta Breo Ellipta, albuterol prn  #Psychiatric condition: pta Bupropion     Diet: 2g Na 2L FR  DVT Prophylaxis: warfarin  Code Status: full code  Lines: DAWSON Wilder, JOSE, NP-C  Advanced Heart Failure/Cardiology II Service  Pager [phone] [facility] [phone]    ================================================================    Subjective/24-Hr Events:   Last 24 hr care team notes reviewed. No tele events. No concerns today. Walked 1.5mi yesterday and felt well. Weights stable, denies shortness of breath.     ROS:  4 point ROS including respiratory, CV, GI and  (other than that noted in the HPI) is negative.     Medications: Reviewed in EPIC.     Physical Exam:   BP (!) 72/61 (BP Location: Left arm)   Pulse 69   Temp 98.3  F (36.8  C) (Oral)   Resp 16   Ht 1.727 m (5' 8\")   Wt 98.9 kg (218 lb)   SpO2 97%   BMI 33.15 kg/m      GENERAL: Appears comfortable, in no distress.  HEENT: Eye symmetrical, no discharge or icterus bilaterally. Mucous membranes moist and without lesions.  NECK: Supple, JVD not visible at 90 degrees.   CV: +mechanical LVAD hum.  RESPIRATORY: Respirations regular, even, and unlabored. Lungs CTA throughout.    GI: Soft and non distended with normoactive bowel sounds present in all quadrants. No tenderness, rebound, guarding.   EXTREMITIES: No peripheral edema. Non " pulsatile.   NEUROLOGIC: Alert and oriented x 3. No focal deficits.   MUSCULOSKELETAL: No joint swelling or tenderness.   SKIN: No jaundice. No rashes or lesions.     Labs:  CMP  Recent Labs   Lab 02/11/21  0614 02/10/21  0539 02/09/21  1008 02/09/21  0518 02/08/21  1659    137  --  137 133   POTASSIUM 4.1 3.6 4.3 3.3* 3.8   CHLORIDE 103 103  --  103 100   CO2 29 29  --  27 28   ANIONGAP 5 5  --  7 5   GLC 91 88  --  103* 188*   BUN 37* 39*  --  35* 37*   CR 1.60* 1.67*  --  1.43* 1.65*   GFRESTIMATED 45* 43*  --  52* 43*   GFRESTBLACK 52* 49*  --  60* 50*   QAMAR 8.5 8.5  --  8.4* 8.5   MAG 2.6* 2.5*  --   --   --    PROTTOTAL  --  6.6*  --   --  6.9   ALBUMIN  --  3.3*  --   --  3.7   BILITOTAL  --  0.6  --   --  0.5   ALKPHOS  --  108  --   --  116   AST  --  38  --   --  38   ALT  --  47  --   --  47     INR  Recent Labs   Lab 02/11/21  0614 02/10/21  0539 02/09/21  0518 02/08/21  1822   INR 2.22* 2.19* 2.32* 2.12*       Time/Communication  I personally spent a total of 25 minutes. Of that 15 minutes was counseling/coordination of patient's care. Plan of care discussed with patient. See my note above for details.    Patient discussed with Dr. Sears.

## 2021-02-11 NOTE — PLAN OF CARE
Pt with history of NICM EF 20% c/b VT s/p CRT-D s/p HMII LVAD 6/19/2017 originally intended as destination therapy 2/2 obesity however with recent weight loss now candidate for transplantation. He is admitted for worsening functional status and renal function concerning for worsening heart failure. He is now listed status 2E for transplant.  Pt with VSS and LVAD #s WNL. SR with 1st degree AVB 60s-80s. Driveline site with transparent weekly dressing CDI, to go to daily dressing changes tomorrow. Denies pain this shift so far but has been having carpal tunnel pain in bilateral wrists and gout pain in LEs. Good I&O. Blood sugars WNL. Abdomen somewhat distended, needs to have a BM, also says he carries fluid in his abdomen. Ambulating well around the station. Plan for cortisone injections into wrists by ortho tomorrow.

## 2021-02-11 NOTE — PROCEDURES
The patient's HeartMate LVAD was interrogated 2/11/2021  * Speed 9600 rpm   * Pulsatility index 3.7-4.8  * Power 6.9 Manuel   * Flow 5.9-6.6 L/minute   Fluid status: euvolemic   Alarms were reviewed, and notable for several PI events per baseline, some with speed drops.   The driveline exit site was inspected, dressing cdi.   All external components were inspected and showed no evidence of damage or malfunction, none replaced.   No changes to VAD settings made

## 2021-02-11 NOTE — PLAN OF CARE
AVSS.  LVAD numbers per pt baseline.  Tylenol/oxycodone for wrist pain(hx carpel tunnel).  Voiding per urinal.  SR 60's with 1AVB.  Pt stable, pleasant without distress.  Continue current POC.  Possible steroid injection per Ortho for wrist pain.  Status 2E for heart transplant.

## 2021-02-11 NOTE — PLAN OF CARE
D: Admitted s/p renal and functional status decline, listed for 2E heart transplant   PMH of NICM EF 20% c/b VT s/p CRT-D s/p HM2 LVAD 6/19/17     I: Monitored vitals and assessed pt status.     A: Pt A0x4. Afebrile. VSS. HRs 60s-70s. Sinus rhythm w/1st degree block. Sats >92% on RA. Pt denies any shortness of breath, chest pain/palpitations, nausea, dizziness, or chills. HM2 LVAD. No flow alarms this shift. LVAD Numbers WNL. Dressing changed. BGs before meals. Low BG during lunch, able to recover w/apple juice given and meal being ordered. Pt is on low saturated fat diet. Pt up independently.     P: Continue to monitor pt status and notify Cards 2 treatment team with any changes or concerns.

## 2021-02-12 ENCOUNTER — APPOINTMENT (OUTPATIENT)
Dept: GENERAL RADIOLOGY | Facility: CLINIC | Age: 66
DRG: 001 | End: 2021-02-12
Attending: NURSE PRACTITIONER
Payer: COMMERCIAL

## 2021-02-12 LAB
ANION GAP SERPL CALCULATED.3IONS-SCNC: 6 MMOL/L (ref 3–14)
BUN SERPL-MCNC: 37 MG/DL (ref 7–30)
C PNEUM DNA SPEC QL NAA+PROBE: NOT DETECTED
CALCIUM SERPL-MCNC: 8.8 MG/DL (ref 8.5–10.1)
CHLORIDE SERPL-SCNC: 101 MMOL/L (ref 94–109)
CO2 SERPL-SCNC: 28 MMOL/L (ref 20–32)
CREAT SERPL-MCNC: 1.71 MG/DL (ref 0.66–1.25)
ERYTHROCYTE [DISTWIDTH] IN BLOOD BY AUTOMATED COUNT: 19.2 % (ref 10–15)
FLUAV H1 2009 PAND RNA SPEC QL NAA+PROBE: NOT DETECTED
FLUAV H1 RNA SPEC QL NAA+PROBE: NOT DETECTED
FLUAV H3 RNA SPEC QL NAA+PROBE: NOT DETECTED
FLUAV RNA SPEC QL NAA+PROBE: NOT DETECTED
FLUBV RNA SPEC QL NAA+PROBE: NOT DETECTED
GFR SERPL CREATININE-BSD FRML MDRD: 41 ML/MIN/{1.73_M2}
GLUCOSE BLDC GLUCOMTR-MCNC: 105 MG/DL (ref 70–99)
GLUCOSE BLDC GLUCOMTR-MCNC: 111 MG/DL (ref 70–99)
GLUCOSE BLDC GLUCOMTR-MCNC: 114 MG/DL (ref 70–99)
GLUCOSE BLDC GLUCOMTR-MCNC: 133 MG/DL (ref 70–99)
GLUCOSE BLDC GLUCOMTR-MCNC: 147 MG/DL (ref 70–99)
GLUCOSE SERPL-MCNC: 103 MG/DL (ref 70–99)
HADV DNA SPEC QL NAA+PROBE: NOT DETECTED
HCOV PNL SPEC NAA+PROBE: NOT DETECTED
HCT VFR BLD AUTO: 35.8 % (ref 40–53)
HGB BLD-MCNC: 10.9 G/DL (ref 13.3–17.7)
HMPV RNA SPEC QL NAA+PROBE: NOT DETECTED
HPIV1 RNA SPEC QL NAA+PROBE: NOT DETECTED
HPIV2 RNA SPEC QL NAA+PROBE: NOT DETECTED
HPIV3 RNA SPEC QL NAA+PROBE: NOT DETECTED
HPIV4 RNA SPEC QL NAA+PROBE: NOT DETECTED
INR PPP: 2.21 (ref 0.86–1.14)
LDH SERPL L TO P-CCNC: 293 U/L (ref 85–227)
M PNEUMO DNA SPEC QL NAA+PROBE: NOT DETECTED
MAGNESIUM SERPL-MCNC: 2.7 MG/DL (ref 1.6–2.3)
MCH RBC QN AUTO: 27.1 PG (ref 26.5–33)
MCHC RBC AUTO-ENTMCNC: 30.4 G/DL (ref 31.5–36.5)
MCV RBC AUTO: 89 FL (ref 78–100)
MICROBIOLOGIST REVIEW: ABNORMAL
PLATELET # BLD AUTO: 248 10E9/L (ref 150–450)
POTASSIUM SERPL-SCNC: 4.1 MMOL/L (ref 3.4–5.3)
RBC # BLD AUTO: 4.02 10E12/L (ref 4.4–5.9)
RSV RNA SPEC QL NAA+PROBE: NOT DETECTED
RSV RNA SPEC QL NAA+PROBE: NOT DETECTED
RV+EV RNA SPEC QL NAA+PROBE: ABNORMAL
SODIUM SERPL-SCNC: 134 MMOL/L (ref 133–144)
WBC # BLD AUTO: 5.5 10E9/L (ref 4–11)

## 2021-02-12 PROCEDURE — 250N000013 HC RX MED GY IP 250 OP 250 PS 637: Performed by: INTERNAL MEDICINE

## 2021-02-12 PROCEDURE — 250N000013 HC RX MED GY IP 250 OP 250 PS 637: Performed by: NURSE PRACTITIONER

## 2021-02-12 PROCEDURE — 80048 BASIC METABOLIC PNL TOTAL CA: CPT | Performed by: STUDENT IN AN ORGANIZED HEALTH CARE EDUCATION/TRAINING PROGRAM

## 2021-02-12 PROCEDURE — 87633 RESP VIRUS 12-25 TARGETS: CPT | Performed by: NURSE PRACTITIONER

## 2021-02-12 PROCEDURE — 87581 M.PNEUMON DNA AMP PROBE: CPT | Performed by: NURSE PRACTITIONER

## 2021-02-12 PROCEDURE — 214N000001 HC R&B CCU UMMC

## 2021-02-12 PROCEDURE — 36415 COLL VENOUS BLD VENIPUNCTURE: CPT | Performed by: STUDENT IN AN ORGANIZED HEALTH CARE EDUCATION/TRAINING PROGRAM

## 2021-02-12 PROCEDURE — 87486 CHLMYD PNEUM DNA AMP PROBE: CPT | Performed by: NURSE PRACTITIONER

## 2021-02-12 PROCEDURE — 85027 COMPLETE CBC AUTOMATED: CPT | Performed by: NURSE PRACTITIONER

## 2021-02-12 PROCEDURE — 71046 X-RAY EXAM CHEST 2 VIEWS: CPT

## 2021-02-12 PROCEDURE — 36415 COLL VENOUS BLD VENIPUNCTURE: CPT | Performed by: NURSE PRACTITIONER

## 2021-02-12 PROCEDURE — 258N000003 HC RX IP 258 OP 636: Performed by: NURSE PRACTITIONER

## 2021-02-12 PROCEDURE — 250N000011 HC RX IP 250 OP 636: Performed by: NURSE PRACTITIONER

## 2021-02-12 PROCEDURE — 250N000013 HC RX MED GY IP 250 OP 250 PS 637: Performed by: STUDENT IN AN ORGANIZED HEALTH CARE EDUCATION/TRAINING PROGRAM

## 2021-02-12 PROCEDURE — 83735 ASSAY OF MAGNESIUM: CPT | Performed by: STUDENT IN AN ORGANIZED HEALTH CARE EDUCATION/TRAINING PROGRAM

## 2021-02-12 PROCEDURE — 83615 LACTATE (LD) (LDH) ENZYME: CPT | Performed by: STUDENT IN AN ORGANIZED HEALTH CARE EDUCATION/TRAINING PROGRAM

## 2021-02-12 PROCEDURE — 999N001017 HC STATISTIC GLUCOSE BY METER IP

## 2021-02-12 PROCEDURE — 85610 PROTHROMBIN TIME: CPT | Performed by: STUDENT IN AN ORGANIZED HEALTH CARE EDUCATION/TRAINING PROGRAM

## 2021-02-12 PROCEDURE — 99232 SBSQ HOSP IP/OBS MODERATE 35: CPT | Mod: 25 | Performed by: NURSE PRACTITIONER

## 2021-02-12 PROCEDURE — 93750 INTERROGATION VAD IN PERSON: CPT | Performed by: NURSE PRACTITIONER

## 2021-02-12 PROCEDURE — 71046 X-RAY EXAM CHEST 2 VIEWS: CPT | Mod: 26 | Performed by: RADIOLOGY

## 2021-02-12 RX ORDER — POLYETHYLENE GLYCOL 3350 17 G/17G
17 POWDER, FOR SOLUTION ORAL DAILY PRN
Status: DISCONTINUED | OUTPATIENT
Start: 2021-02-12 | End: 2021-05-31 | Stop reason: HOSPADM

## 2021-02-12 RX ORDER — BUMETANIDE 2 MG/1
4 TABLET ORAL DAILY
Status: DISCONTINUED | OUTPATIENT
Start: 2021-02-13 | End: 2021-02-19

## 2021-02-12 RX ORDER — GUAIFENESIN 600 MG/1
600 TABLET, EXTENDED RELEASE ORAL 2 TIMES DAILY
Status: DISCONTINUED | OUTPATIENT
Start: 2021-02-12 | End: 2021-02-23

## 2021-02-12 RX ORDER — OXYCODONE HYDROCHLORIDE 5 MG/1
5-10 TABLET ORAL EVERY 6 HOURS PRN
Status: DISCONTINUED | OUTPATIENT
Start: 2021-02-12 | End: 2021-02-13

## 2021-02-12 RX ORDER — WARFARIN SODIUM 7.5 MG/1
7.5 TABLET ORAL
Status: COMPLETED | OUTPATIENT
Start: 2021-02-12 | End: 2021-02-12

## 2021-02-12 RX ADMIN — BUPROPION HYDROCHLORIDE 150 MG: 150 TABLET, EXTENDED RELEASE ORAL at 08:45

## 2021-02-12 RX ADMIN — OXYCODONE HYDROCHLORIDE 10 MG: 5 TABLET ORAL at 09:10

## 2021-02-12 RX ADMIN — CEFDINIR 300 MG: 300 CAPSULE ORAL at 08:44

## 2021-02-12 RX ADMIN — GUAIFENESIN 600 MG: 600 TABLET ORAL at 10:20

## 2021-02-12 RX ADMIN — OXYCODONE HYDROCHLORIDE 5 MG: 5 TABLET ORAL at 05:15

## 2021-02-12 RX ADMIN — DICLOFENAC SODIUM 4 G: 10 GEL TOPICAL at 03:22

## 2021-02-12 RX ADMIN — GUAIFENESIN 600 MG: 600 TABLET ORAL at 20:52

## 2021-02-12 RX ADMIN — GABAPENTIN 300 MG: 300 CAPSULE ORAL at 20:51

## 2021-02-12 RX ADMIN — BUMETANIDE 3 MG: 2 TABLET ORAL at 16:02

## 2021-02-12 RX ADMIN — ALLOPURINOL 300 MG: 300 TABLET ORAL at 08:45

## 2021-02-12 RX ADMIN — POTASSIUM CHLORIDE 60 MEQ: 1500 TABLET, EXTENDED RELEASE ORAL at 08:44

## 2021-02-12 RX ADMIN — FLUTICASONE FUROATE AND VILANTEROL TRIFENATATE 1 PUFF: 100; 25 POWDER RESPIRATORY (INHALATION) at 08:45

## 2021-02-12 RX ADMIN — ISOSORBIDE DINITRATE 10 MG: 10 TABLET ORAL at 14:16

## 2021-02-12 RX ADMIN — MONTELUKAST 10 MG: 10 TABLET, FILM COATED ORAL at 20:53

## 2021-02-12 RX ADMIN — ACETAMINOPHEN 650 MG: 325 TABLET, FILM COATED ORAL at 21:05

## 2021-02-12 RX ADMIN — OXYCODONE HYDROCHLORIDE 10 MG: 5 TABLET ORAL at 21:05

## 2021-02-12 RX ADMIN — WARFARIN SODIUM 7.5 MG: 7.5 TABLET ORAL at 18:21

## 2021-02-12 RX ADMIN — IRON SUCROSE 200 MG: 20 INJECTION, SOLUTION INTRAVENOUS at 10:19

## 2021-02-12 RX ADMIN — GABAPENTIN 300 MG: 300 CAPSULE ORAL at 14:15

## 2021-02-12 RX ADMIN — DOCUSATE SODIUM 50 MG AND SENNOSIDES 8.6 MG 1 TABLET: 8.6; 5 TABLET, FILM COATED ORAL at 20:53

## 2021-02-12 RX ADMIN — GABAPENTIN 300 MG: 300 CAPSULE ORAL at 08:44

## 2021-02-12 RX ADMIN — OXYCODONE HYDROCHLORIDE 10 MG: 5 TABLET ORAL at 16:02

## 2021-02-12 RX ADMIN — HYDRALAZINE HYDROCHLORIDE 75 MG: 25 TABLET, FILM COATED ORAL at 08:44

## 2021-02-12 RX ADMIN — BUMETANIDE 4 MG: 2 TABLET ORAL at 08:44

## 2021-02-12 RX ADMIN — ACETAMINOPHEN 975 MG: 325 TABLET, FILM COATED ORAL at 05:15

## 2021-02-12 RX ADMIN — ISOSORBIDE DINITRATE 10 MG: 10 TABLET ORAL at 08:44

## 2021-02-12 RX ADMIN — UMECLIDINIUM 1 PUFF: 62.5 AEROSOL, POWDER ORAL at 08:45

## 2021-02-12 RX ADMIN — AMIODARONE HYDROCHLORIDE 200 MG: 200 TABLET ORAL at 08:45

## 2021-02-12 RX ADMIN — ASPIRIN 81 MG CHEWABLE TABLET 81 MG: 81 TABLET CHEWABLE at 08:44

## 2021-02-12 RX ADMIN — ALBUTEROL SULFATE 2 PUFF: 90 AEROSOL, METERED RESPIRATORY (INHALATION) at 07:45

## 2021-02-12 RX ADMIN — CEFDINIR 300 MG: 300 CAPSULE ORAL at 20:51

## 2021-02-12 RX ADMIN — HYDRALAZINE HYDROCHLORIDE 75 MG: 25 TABLET, FILM COATED ORAL at 14:16

## 2021-02-12 ASSESSMENT — ACTIVITIES OF DAILY LIVING (ADL)
ADLS_ACUITY_SCORE: 12

## 2021-02-12 ASSESSMENT — MIFFLIN-ST. JEOR: SCORE: 1772.5

## 2021-02-12 NOTE — PROGRESS NOTES
LVAD/Transplant Social Work Services Progress Note      Date of Initial Social Work Evaluation: 2/10/2021  Collaborated with: Pt, Luba Marrero     Data: LVAD pt currently listed for heart transplant status 2E. Completed HCD and document notarized and scanned into pt's chart. Pt's wife primary decision maker and sister secondary.     Intervention: Completion of HCD  Assessment: Pt alert and oriented x4. Relieved that HCD was completed today.   Education provided by SW: Ongoing Social Work support   Plan:    Discharge Plans in Progress: None     Barriers to d/c plan: Pt is currently listed 2E for heart transplant     Follow up Plan: SW to continue to follow.

## 2021-02-12 NOTE — PROGRESS NOTES
Brief Orthopedic Surgery Note    Transplant team prefers to defer surgery due to wound healing concerns post transplant.     I spoke with the patient and relayed this plan. Offered bilateral carpal tunnel injections. Dr. Brand recommends that these be done under ultrasound. Discussed with the patient there are a few providers in our department who do these injections under ultrasound, however this is usually done in clinic. Unfortunately IR at [facility] is unable to preform this injection. Will coordinated with our team to determine the ability to perform an ultrasound guided carpal tunnel injection while inpatient. This would likly not occur until next week sometime.      I discussed this with the patient. He will contact us in the interim if his pain becomes unbearable and he would like to do a bedside injection without imaging. Patient understood and was in agreement with the plan.      Please page Orthopedic Surgery if further assistance is needed.     EDUARDO BELL PA-C  2/12/2021 5:07 PM  Orthopaedic Surgery  Pager: [phone]     Thank you for allowing me to participate in this patient's care. Please page me at [phone] with any questions/concerns. If there is no response, if it is a weekend, or if it is during evening hours, please page the orthopaedic surgery resident on call.

## 2021-02-12 NOTE — PLAN OF CARE
D: Admitted s/p renal and functional status decline, listed for 2E heart transplant   PMH of NICM EF 20% c/b VT s/p CRT-D s/p HM2 LVAD 6/19/17    I: Monitored vitals and assessed pt status.     PRN: Oxycodone 10 mg      A: Pt A0x4. Afebrile. VSS. HRs 60-80s. Sinus rhythm w/1st degree AV Block. 15 beats of VT this afternoon, HRs 140s. MD notified. Pt asymptomatic. Sats >92% on RA. Pt denies any shortness of breath, chest pain/palpitaitons, nausea, dizziness, or chills. HM2 LVAD. Numbers WNL. No flow alarms during shift. Dressing changed. Pt states pain on bilateral wrists. Oxycodone and heat applied w/some relief. . Respiratory panel sent, positive for rhinovirus, MD notified. Pt on 2g Na+ diet. Pt up independently     P: Continue to monitor pt status and notify Cards 2 treatment team with any changes or concerns.

## 2021-02-12 NOTE — PLAN OF CARE
Admitted s/p renal and functional status decline, listed for 2E heart transplant   PMH of NICM EF 20% c/b VT s/p CRT-D s/p HM2 LVAD 6/19/17. Pt with VSS and LVAD #s WNL. SR 60-70s with first degree AVB. Oxycodone and tylenol for wrist pain with some relief. Hot packs given. Lantus insulin held because of low BG today with MD MCCLELLAND. Saw ortho today for carpal tunnel issues.

## 2021-02-12 NOTE — PLAN OF CARE
D/He is up in the chair and takes prn oxy for painful carpal tunnel ache. He continues on po diuretics on the TP list. He ate well. He told me to skip Lantus tonight as he did not eat that much today.

## 2021-02-12 NOTE — PROGRESS NOTES
"SPIRITUAL HEALTH SERVICES  SPIRITUAL ASSESSMENT Progress Note  [facility] 6C     REFERRAL SOURCE: I did visit this morning patient Jim hidalgo as follow up visit. Pt appreciated to see this unit  again. Pt said, \"I am strong person in life physically and spiritually. However, one thin is disturbing my non stop finger pain. The medical team they tried to  help me on this parts all their best and I appreciated their gifts. Besides my finger pain , I am not complain much but I give thanks to my God in all those things.\" After I listen the pt current fear and anxiety, I shared with the pt, some hope giving messages and talked about together about his future well being. At the end of our conversation, I offered him a prayer as usual.    PLAN: I will remain open to provide spiritual care for the pt as needed.    Chari Lui M.Div. ([place]), M.Th., D.Min., [facility]  Staff   Pager [phone]   "

## 2021-02-12 NOTE — PLAN OF CARE
AVSS.  A&OX4.  LVAD numbers WNL.  Pt reports little relief from pain in hands with Tylenol and oxycodone.  Hot packs applied.  MD notified.  Voltaren gel X1 ordered.  Pt stable.  Resting at bedside.  SR with 1AVB/BBB.  Continue to monitor pain and effectiveness of interventions.  Status 2E for heart transplant.  Notify team with issues/concerns.

## 2021-02-12 NOTE — PROCEDURES
The patient's HeartMate LVAD was interrogated 2/12/2021  * Speed 9600 rpm   * Pulsatility index 3.2/-4/7  * Power 6.2-6.7 Manuel   * Flow 5.8-6.6 L/minute   Fluid status: euvolemic   Alarms were reviewed, and notable for several PI events per baseline, some with speed drops.   The driveline exit site was inspected, dressing cdi.   All external components were inspected and showed no evidence of damage or malfunction, none replaced.   No changes to VAD settings made

## 2021-02-12 NOTE — PROGRESS NOTES
[facility]   Cardiology II Service / Advanced Heart Failure  Daily Progress Note      Patient: Jim Willingham  MRN: [id number]  Admission Date: 2/8/2021  Hospital Day # 4    Assessment and Plan: Jim Willingham is a 65 year old male with history of NICM EF 20% c/b VT s/p CRT-D s/p HMII LVAD 6/19/2017 originally intended as destination therapy 2/2 obesity however with recent weight loss now candidate for transplantation. He is admitted for worsening functional status and renal function concerning for worsening heart failure. He is now listed status 2E for transplant, extension due 2/22/21.    Today's Plan:  -decrease Bumex to 4 mg in AM 3 mg in PM  -ortho to do bilat wrist steroid injection  -CXR, resp PCR, symptomatic treatment of URI with muccinex  -increase prn oxycodone to 5-10 mg prn q6hr    #Chronic systolic heart failure 2/2 NICM (EF 20%)   #s/p HM II LVAD placement (6/19/17) as DT (obesity)  #Listed status 2E for transplant due to multiple LVAD complications  Stage D. NYHA class III. TTE 1/8/21 at 9400rpm, EF<30%m LVIDd 6.8cm, at least mildly reduced RV function, AoV closed without AI, mild-mod eccentric MR, dilated IVC without collapse.     Fluid status: euvolemic, decrease bumetanide 4 mg in AM 3 mg in PM  BB: deferred due to recent decompensations and low cardiac output  ACEi/ARB/Afterload reduction: hydralazine 50 TID, isosorbide dinitrate 10 mg TID   Antiplatelet: ASA 81 mg   Anticoagulation: warfarin dosing per pharmacy, INR goal 2-3, INR 2.2 today  SCD: dual chamber ICD                #Carpal tunnel, bilateral R>L  Worsening symptoms recently; significant numbness and pain. E/o thenar atrophy. Relieved temporarily with injection 11/2020. Surgery was discussed prior to admission. Note; paraproteins normal in the past, defer further amyloid workup.  - ortho consulted for steroid injection while inpatient, recommending minimally invasive surgery for more permanent treatment  - discussed at  "transplant meeting, preference to wait on surgery due to wound healing post-transplant/high dose IS  - prn apap and oxycodone    #Staph hominis bacteremia  Cleared, no concerns for active infection.     #Recurrent bronchitis  Afebrile, normal WBC. Started on cefdinir 2/5 plan was for 10 d course (he has does this course frequently in the past with relief). COVID negative 2/8. Worsening rhinorrhea and congestion 2/12.   - continue Omnicef BID for now, if viral consider stopping  - CXR today  - resp PCR, symptomatic treatment with muccinex    #WALTER on CKD, improved  Admission Cr. 2 <- 2.22. Improved with decreased Bumex as outpatient. Cr stable 1.6.  - daily BMP     #H/o atrial fibrillation on warfarin  #H/o VT/VF on amiodarone  Currently in NSR.   - continue warfarin as above  - continue amiodarone 200 mg daily (decreased per Dr Knott note from 2/5 given transplant listing)     Chronic conditions  #DM type II: glargine 6 unit, medium dose insulin sliding scale  #COPD/Asthma: pta Breo Ellipta, albuterol prn  #Psychiatric condition: pta Bupropion     Diet: 2g Na 2L FR  DVT Prophylaxis: warfarin  Code Status: full code  Lines: DAWSON Wilder, JOSE, NP-C  Advanced Heart Failure/Cardiology II Service  Pager [phone] [facility] [id number]    ================================================================    Subjective/24-Hr Events:   Last 24 hr care team notes reviewed. No overnight events. Didn't sleep well due to wrist pains R>L. No orthopnea, PND, LE edema, lightheadedness.     ROS:  4 point ROS including respiratory, CV, GI and  (other than that noted in the HPI) is negative.     Medications: Reviewed in EPIC.     Physical Exam:   /69 (BP Location: Right arm)   Pulse 75   Temp 98  F (36.7  C) (Oral)   Resp 16   Ht 1.727 m (5' 8\")   Wt 98.9 kg (218 lb)   SpO2 96%   BMI 33.15 kg/m      GENERAL: Appears comfortable, in no distress.  HEENT: Eye symmetrical, no discharge or icterus bilaterally. Mucous " membranes moist and without lesions.  NECK: Supple, JVD not visible at 90 degrees.   CV: +mechanical LVAD hum.  RESPIRATORY: Respirations regular, even, and unlabored. Lungs CTA throughout.    GI: Soft and non distended with normoactive bowel sounds present in all quadrants. No tenderness, rebound, guarding.   EXTREMITIES: No peripheral edema. Non pulsatile.   NEUROLOGIC: Alert and oriented x 3. No focal deficits.   MUSCULOSKELETAL: No joint swelling or tenderness.   SKIN: No jaundice. No rashes or lesions.     Labs:  CMP  Recent Labs   Lab 02/12/21  0525 02/11/21  0614 02/10/21  0539 02/09/21  1008 02/09/21  0518 02/08/21  1659    137 137  --  137 133   POTASSIUM 4.1 4.1 3.6 4.3 3.3* 3.8   CHLORIDE 101 103 103  --  103 100   CO2 28 29 29  --  27 28   ANIONGAP 6 5 5  --  7 5   * 91 88  --  103* 188*   BUN 37* 37* 39*  --  35* 37*   CR 1.71* 1.60* 1.67*  --  1.43* 1.65*   GFRESTIMATED 41* 45* 43*  --  52* 43*   GFRESTBLACK 48* 52* 49*  --  60* 50*   QAMAR 8.8 8.5 8.5  --  8.4* 8.5   MAG 2.7* 2.6* 2.5*  --   --   --    PROTTOTAL  --   --  6.6*  --   --  6.9   ALBUMIN  --   --  3.3*  --   --  3.7   BILITOTAL  --   --  0.6  --   --  0.5   ALKPHOS  --   --  108  --   --  116   AST  --   --  38  --   --  38   ALT  --   --  47  --   --  47     INR  Recent Labs   Lab 02/12/21  0525 02/11/21  0614 02/10/21  0539 02/09/21  0518   INR 2.21* 2.22* 2.19* 2.32*       Time/Communication  I personally spent a total of 25 minutes. Of that 15 minutes was counseling/coordination of patient's care. Plan of care discussed with patient. See my note above for details.    Patient discussed with Dr. Seth.

## 2021-02-13 LAB
ALBUMIN SERPL-MCNC: 3.2 G/DL (ref 3.4–5)
ALP SERPL-CCNC: 97 U/L (ref 40–150)
ALT SERPL W P-5'-P-CCNC: 40 U/L (ref 0–70)
ANION GAP SERPL CALCULATED.3IONS-SCNC: 6 MMOL/L (ref 3–14)
AST SERPL W P-5'-P-CCNC: 28 U/L (ref 0–45)
BILIRUB DIRECT SERPL-MCNC: 0.2 MG/DL (ref 0–0.2)
BILIRUB SERPL-MCNC: 0.6 MG/DL (ref 0.2–1.3)
BUN SERPL-MCNC: 37 MG/DL (ref 7–30)
CALCIUM SERPL-MCNC: 8.8 MG/DL (ref 8.5–10.1)
CHLORIDE SERPL-SCNC: 98 MMOL/L (ref 94–109)
CO2 SERPL-SCNC: 28 MMOL/L (ref 20–32)
CREAT SERPL-MCNC: 1.75 MG/DL (ref 0.66–1.25)
ERYTHROCYTE [DISTWIDTH] IN BLOOD BY AUTOMATED COUNT: 19.2 % (ref 10–15)
GFR SERPL CREATININE-BSD FRML MDRD: 40 ML/MIN/{1.73_M2}
GLUCOSE BLDC GLUCOMTR-MCNC: 103 MG/DL (ref 70–99)
GLUCOSE BLDC GLUCOMTR-MCNC: 107 MG/DL (ref 70–99)
GLUCOSE BLDC GLUCOMTR-MCNC: 116 MG/DL (ref 70–99)
GLUCOSE BLDC GLUCOMTR-MCNC: 117 MG/DL (ref 70–99)
GLUCOSE BLDC GLUCOMTR-MCNC: 122 MG/DL (ref 70–99)
GLUCOSE SERPL-MCNC: 94 MG/DL (ref 70–99)
HCT VFR BLD AUTO: 32.7 % (ref 40–53)
HGB BLD-MCNC: 9.9 G/DL (ref 13.3–17.7)
INR PPP: 2.34 (ref 0.86–1.14)
MAGNESIUM SERPL-MCNC: 2.4 MG/DL (ref 1.6–2.3)
MCH RBC QN AUTO: 26.7 PG (ref 26.5–33)
MCHC RBC AUTO-ENTMCNC: 30.3 G/DL (ref 31.5–36.5)
MCV RBC AUTO: 88 FL (ref 78–100)
PLATELET # BLD AUTO: 234 10E9/L (ref 150–450)
POTASSIUM SERPL-SCNC: 4.2 MMOL/L (ref 3.4–5.3)
PROT SERPL-MCNC: 6.5 G/DL (ref 6.8–8.8)
RBC # BLD AUTO: 3.71 10E12/L (ref 4.4–5.9)
SODIUM SERPL-SCNC: 132 MMOL/L (ref 133–144)
WBC # BLD AUTO: 12.1 10E9/L (ref 4–11)

## 2021-02-13 PROCEDURE — 999N001017 HC STATISTIC GLUCOSE BY METER IP

## 2021-02-13 PROCEDURE — 80048 BASIC METABOLIC PNL TOTAL CA: CPT | Performed by: STUDENT IN AN ORGANIZED HEALTH CARE EDUCATION/TRAINING PROGRAM

## 2021-02-13 PROCEDURE — 80076 HEPATIC FUNCTION PANEL: CPT | Performed by: STUDENT IN AN ORGANIZED HEALTH CARE EDUCATION/TRAINING PROGRAM

## 2021-02-13 PROCEDURE — 36415 COLL VENOUS BLD VENIPUNCTURE: CPT | Performed by: STUDENT IN AN ORGANIZED HEALTH CARE EDUCATION/TRAINING PROGRAM

## 2021-02-13 PROCEDURE — 214N000001 HC R&B CCU UMMC

## 2021-02-13 PROCEDURE — 99232 SBSQ HOSP IP/OBS MODERATE 35: CPT | Mod: 25 | Performed by: NURSE PRACTITIONER

## 2021-02-13 PROCEDURE — 83735 ASSAY OF MAGNESIUM: CPT | Performed by: STUDENT IN AN ORGANIZED HEALTH CARE EDUCATION/TRAINING PROGRAM

## 2021-02-13 PROCEDURE — 85610 PROTHROMBIN TIME: CPT | Performed by: STUDENT IN AN ORGANIZED HEALTH CARE EDUCATION/TRAINING PROGRAM

## 2021-02-13 PROCEDURE — 250N000013 HC RX MED GY IP 250 OP 250 PS 637: Performed by: STUDENT IN AN ORGANIZED HEALTH CARE EDUCATION/TRAINING PROGRAM

## 2021-02-13 PROCEDURE — 250N000013 HC RX MED GY IP 250 OP 250 PS 637: Performed by: INTERNAL MEDICINE

## 2021-02-13 PROCEDURE — 258N000003 HC RX IP 258 OP 636: Performed by: NURSE PRACTITIONER

## 2021-02-13 PROCEDURE — 250N000011 HC RX IP 250 OP 636: Performed by: NURSE PRACTITIONER

## 2021-02-13 PROCEDURE — 85027 COMPLETE CBC AUTOMATED: CPT | Performed by: STUDENT IN AN ORGANIZED HEALTH CARE EDUCATION/TRAINING PROGRAM

## 2021-02-13 PROCEDURE — 93750 INTERROGATION VAD IN PERSON: CPT | Performed by: NURSE PRACTITIONER

## 2021-02-13 PROCEDURE — 250N000013 HC RX MED GY IP 250 OP 250 PS 637: Performed by: NURSE PRACTITIONER

## 2021-02-13 RX ORDER — OXYCODONE HYDROCHLORIDE 5 MG/1
5-10 TABLET ORAL EVERY 4 HOURS PRN
Status: DISCONTINUED | OUTPATIENT
Start: 2021-02-14 | End: 2021-02-18

## 2021-02-13 RX ORDER — CEFDINIR 300 MG/1
300 CAPSULE ORAL 2 TIMES DAILY
Status: DISCONTINUED | OUTPATIENT
Start: 2021-02-13 | End: 2021-02-15

## 2021-02-13 RX ORDER — GABAPENTIN 300 MG/1
600 CAPSULE ORAL AT BEDTIME
Status: DISCONTINUED | OUTPATIENT
Start: 2021-02-13 | End: 2021-02-23

## 2021-02-13 RX ORDER — BUMETANIDE 2 MG/1
4 TABLET ORAL DAILY
Status: DISCONTINUED | OUTPATIENT
Start: 2021-02-13 | End: 2021-02-16

## 2021-02-13 RX ORDER — WARFARIN SODIUM 7.5 MG/1
7.5 TABLET ORAL
Status: COMPLETED | OUTPATIENT
Start: 2021-02-13 | End: 2021-02-13

## 2021-02-13 RX ORDER — GABAPENTIN 300 MG/1
300 CAPSULE ORAL 2 TIMES DAILY
Status: DISCONTINUED | OUTPATIENT
Start: 2021-02-13 | End: 2021-02-23

## 2021-02-13 RX ADMIN — AMIODARONE HYDROCHLORIDE 200 MG: 200 TABLET ORAL at 09:21

## 2021-02-13 RX ADMIN — BUMETANIDE 4 MG: 2 TABLET ORAL at 09:19

## 2021-02-13 RX ADMIN — HYDRALAZINE HYDROCHLORIDE 75 MG: 25 TABLET, FILM COATED ORAL at 14:24

## 2021-02-13 RX ADMIN — WARFARIN SODIUM 7.5 MG: 7.5 TABLET ORAL at 18:07

## 2021-02-13 RX ADMIN — ISOSORBIDE DINITRATE 10 MG: 10 TABLET ORAL at 14:25

## 2021-02-13 RX ADMIN — ISOSORBIDE DINITRATE 10 MG: 10 TABLET ORAL at 09:20

## 2021-02-13 RX ADMIN — OXYCODONE HYDROCHLORIDE 10 MG: 5 TABLET ORAL at 03:26

## 2021-02-13 RX ADMIN — CEFDINIR 300 MG: 300 CAPSULE ORAL at 20:22

## 2021-02-13 RX ADMIN — FLUTICASONE FUROATE AND VILANTEROL TRIFENATATE 1 PUFF: 100; 25 POWDER RESPIRATORY (INHALATION) at 09:22

## 2021-02-13 RX ADMIN — BUMETANIDE 4 MG: 2 TABLET ORAL at 16:12

## 2021-02-13 RX ADMIN — GABAPENTIN 300 MG: 300 CAPSULE ORAL at 14:25

## 2021-02-13 RX ADMIN — BUPROPION HYDROCHLORIDE 150 MG: 150 TABLET, EXTENDED RELEASE ORAL at 09:19

## 2021-02-13 RX ADMIN — GABAPENTIN 600 MG: 300 CAPSULE ORAL at 22:22

## 2021-02-13 RX ADMIN — ALLOPURINOL 300 MG: 300 TABLET ORAL at 09:21

## 2021-02-13 RX ADMIN — ACETAMINOPHEN 975 MG: 325 TABLET, FILM COATED ORAL at 09:37

## 2021-02-13 RX ADMIN — HYDRALAZINE HYDROCHLORIDE 75 MG: 25 TABLET, FILM COATED ORAL at 20:23

## 2021-02-13 RX ADMIN — MONTELUKAST 10 MG: 10 TABLET, FILM COATED ORAL at 20:24

## 2021-02-13 RX ADMIN — POLYETHYLENE GLYCOL 3350 17 G: 17 POWDER, FOR SOLUTION ORAL at 09:19

## 2021-02-13 RX ADMIN — UMECLIDINIUM 1 PUFF: 62.5 AEROSOL, POWDER ORAL at 09:21

## 2021-02-13 RX ADMIN — ACETAMINOPHEN 650 MG: 325 TABLET, FILM COATED ORAL at 16:12

## 2021-02-13 RX ADMIN — DOCUSATE SODIUM 50 MG AND SENNOSIDES 8.6 MG 1 TABLET: 8.6; 5 TABLET, FILM COATED ORAL at 20:23

## 2021-02-13 RX ADMIN — ACETAMINOPHEN 975 MG: 325 TABLET, FILM COATED ORAL at 01:48

## 2021-02-13 RX ADMIN — GUAIFENESIN 600 MG: 600 TABLET ORAL at 09:31

## 2021-02-13 RX ADMIN — ASPIRIN 81 MG CHEWABLE TABLET 81 MG: 81 TABLET CHEWABLE at 09:20

## 2021-02-13 RX ADMIN — CEFDINIR 300 MG: 300 CAPSULE ORAL at 09:29

## 2021-02-13 RX ADMIN — ISOSORBIDE DINITRATE 10 MG: 10 TABLET ORAL at 20:23

## 2021-02-13 RX ADMIN — OXYCODONE HYDROCHLORIDE 10 MG: 5 TABLET ORAL at 16:12

## 2021-02-13 RX ADMIN — GUAIFENESIN 600 MG: 600 TABLET ORAL at 20:30

## 2021-02-13 RX ADMIN — OXYCODONE HYDROCHLORIDE 10 MG: 5 TABLET ORAL at 20:23

## 2021-02-13 RX ADMIN — GABAPENTIN 300 MG: 300 CAPSULE ORAL at 09:21

## 2021-02-13 RX ADMIN — Medication 10 MG: at 22:23

## 2021-02-13 RX ADMIN — IRON SUCROSE 200 MG: 20 INJECTION, SOLUTION INTRAVENOUS at 09:29

## 2021-02-13 RX ADMIN — HYDRALAZINE HYDROCHLORIDE 75 MG: 25 TABLET, FILM COATED ORAL at 09:20

## 2021-02-13 RX ADMIN — POTASSIUM CHLORIDE 60 MEQ: 1500 TABLET, EXTENDED RELEASE ORAL at 09:28

## 2021-02-13 RX ADMIN — OXYCODONE HYDROCHLORIDE 10 MG: 5 TABLET ORAL at 09:37

## 2021-02-13 RX ADMIN — ACETAMINOPHEN 650 MG: 325 TABLET, FILM COATED ORAL at 20:25

## 2021-02-13 ASSESSMENT — ACTIVITIES OF DAILY LIVING (ADL)
ADLS_ACUITY_SCORE: 12

## 2021-02-13 ASSESSMENT — MIFFLIN-ST. JEOR: SCORE: 1782.84

## 2021-02-13 NOTE — PROGRESS NOTES
[facility]   Cardiology II Service / Advanced Heart Failure  Daily Progress Note      Patient: Jim Willingham  MRN: [id number]  Admission Date: 2/8/2021  Hospital Day # 5    Assessment and Plan: Jim Willingham is a 65 year old male with history of NICM EF 20% c/b VT s/p CRT-D s/p HMII LVAD 6/19/2017 originally intended as destination therapy 2/2 obesity however with recent weight loss now candidate for transplantation. He is admitted for worsening functional status and renal function concerning for worsening heart failure. He is now listed status 2E for transplant, extension due 2/22/21.    Today's Plan:  -Bumex 4 mg bid  -will follow-up with ortho Monday re: wrist injections  -prn nebs, muccinex, saline rinses for URI sxs  -increase prn oxycodone to 5-10 mg prn q4hr, increase HS gabapentin   -daily CBC  -decrease lantus 4 units     #Chronic systolic heart failure 2/2 NICM (EF 20%)   #s/p HM II LVAD placement (6/19/17) as DT (obesity)  #Listed status 2E for transplant due to multiple LVAD complications  Stage D. NYHA class III. TTE 1/8/21 at 9400rpm, EF<30%m LVIDd 6.8cm, at least mildly reduced RV function, AoV closed without AI, mild-mod eccentric MR, dilated IVC without collapse.     Fluid status: euvolemic, bumetanide 4 mg bid  BB: deferred due to recent decompensations and low cardiac output  ACEi/ARB/Afterload reduction: hydralazine 50 TID, isosorbide dinitrate 10 mg TID   Antiplatelet: ASA 81 mg   Anticoagulation: warfarin dosing per pharmacy, INR goal 2-3, INR 2.3 today  SCD: dual chamber ICD                #Carpal tunnel, bilateral R>L  Worsening symptoms recently; significant numbness and pain. E/o thenar atrophy. Relieved temporarily with injection 11/2020. Surgery was discussed prior to admission. Note: paraproteins normal in the past, defer further amyloid workup.  - ortho consulted for steroid injection while inpatient, recommended minimally invasive surgery for more permanent  treatment   - discussed at transplant meeting, preference to wait on surgery due to wound healing post-transplant/high dose IS   - plan is for US guided steroid injection  - prn apap and oxycodone 5-10 mg q4hr prn  - increase gabapentin 300, 300, 600 mg at HS    #Hx staph hominis bacteremia  Cleared, no concerns for active infection. Off abx since fall 2020 without symptoms of recurrence.   - will discuss whether we need surveillance blood cx    #Recurrent bronchitis  #Rhinovirus  Afebrile, normal WBC. Started on cefdinir 2/5 plan was for 10 d course (he has does this course frequently in the past with relief). COVID negative 2/8. Worsening rhinorrhea and congestion 2/12. Resp PCR +rhinovirus. CXR 2/12 with atelectasis, no over PNA.  - continue Omnicef BID through 2/15 per patient request  - symptomatic treatment with muccinex, saline rinses, nebs prn, IS  - monitor CBC    #WALTER on CKD, improved  Admission Cr. 2 <- 2.22. Improved with decreased Bumex as outpatient. Cr stable 1.6-1.7  - daily BMP     #H/o atrial fibrillation on warfarin  #H/o VT/VF on amiodarone  Currently in NSR.   - continue warfarin as above  - continue amiodarone 200 mg daily (decreased per Dr Knott note from 2/5 given transplant listing)     Chronic conditions  #DM type II: glargine decrease to 4 unit, medium dose insulin sliding scale  #COPD/Asthma: pta Breo Ellipta, albuterol prn  #Depression: pta Bupropion     Diet: 2g Na 2L FR  DVT Prophylaxis: warfarin  Code Status: full code  Lines: DAWSON Wilder DNP, NP-C  Advanced Heart Failure/Cardiology II Service  Pager [phone] [facility] [id number]    ================================================================    Subjective/24-Hr Events:   Last 24 hr care team notes reviewed. No overnight events. Slept better, though woke up with wrist pain. Feeling a little bloated today, weight up slightly. Denies orthopnea or PND. Rhinorrhea better with muccinex, no cough or shortness of breath. Refused  "lantus last twp days, wondering if this can be d/c'ed.     ROS:  4 point ROS including respiratory, CV, GI and  (other than that noted in the HPI) is negative.     Medications: Reviewed in EPIC.     Physical Exam:   BP 98/81 (BP Location: Left arm)   Pulse 68   Temp 98.9  F (37.2  C) (Oral)   Resp 18   Ht 1.727 m (5' 8\")   Wt 101.3 kg (223 lb 6.4 oz)   SpO2 97%   BMI 33.97 kg/m      GENERAL: Appears comfortable, in no distress.  HEENT: Eye symmetrical, no discharge or icterus bilaterally. Mucous membranes moist and without lesions.  NECK: Supple, JVD not visible at 90 degrees.   CV: +mechanical LVAD hum.  RESPIRATORY: Respirations regular, even, and unlabored. Lungs CTA throughout.    GI: Soft and non distended with normoactive bowel sounds present in all quadrants. No tenderness, rebound, guarding.   EXTREMITIES: No peripheral edema. Non pulsatile.   NEUROLOGIC: Alert and oriented x 3. No focal deficits.   MUSCULOSKELETAL: No joint swelling or tenderness.   SKIN: No jaundice. No rashes or lesions.     Labs:  CMP  Recent Labs   Lab 02/13/21  0608 02/12/21  0525 02/11/21  0614 02/10/21  0539 02/08/21  1659 02/08/21  1659   * 134 137 137   < > 133   POTASSIUM 4.2 4.1 4.1 3.6   < > 3.8   CHLORIDE 98 101 103 103   < > 100   CO2 28 28 29 29   < > 28   ANIONGAP 6 6 5 5   < > 5   GLC 94 103* 91 88   < > 188*   BUN 37* 37* 37* 39*   < > 37*   CR 1.75* 1.71* 1.60* 1.67*   < > 1.65*   GFRESTIMATED 40* 41* 45* 43*   < > 43*   GFRESTBLACK 47* 48* 52* 49*   < > 50*   QAMAR 8.8 8.8 8.5 8.5   < > 8.5   MAG 2.4* 2.7* 2.6* 2.5*  --   --    PROTTOTAL 6.5*  --   --  6.6*  --  6.9   ALBUMIN 3.2*  --   --  3.3*  --  3.7   BILITOTAL 0.6  --   --  0.6  --  0.5   ALKPHOS 97  --   --  108  --  116   AST 28  --   --  38  --  38   ALT 40  --   --  47  --  47    < > = values in this interval not displayed.     INR  Recent Labs   Lab 02/13/21  0608 02/12/21  0525 02/11/21  0614 02/10/21  0539   INR 2.34* 2.21* 2.22* 2.19* "       Time/Communication  I personally spent a total of 25 minutes. Of that 15 minutes was counseling/coordination of patient's care. Plan of care discussed with patient. See my note above for details.    Patient discussed with Dr. Seth.

## 2021-02-13 NOTE — PLAN OF CARE
"D/He continues in iso for the cold virus. He has scheduled Mucinex. He is up and about, and continues to take tylenol and oxycodone for sore wrist pain. He says he has a good appetite. He says he did not have BM yesterday, as he \"did not eat much\". He hopes to have a BM later today, and continues on po diuretics  P/on heart TP waiting list    "

## 2021-02-13 NOTE — PLAN OF CARE
D: Pt has hx: NICM EF 20% c/b VT s/p CRT-D s/p HMII LVAD 6/19/2017 originally intended as destination therapy 2/2 obesity however with recent weight loss now candidate for transplantation. He is admitted for worsening functional status and renal function concerning for worsening heart failure. He is now listed status 2E for transplant, extension due 2/22/21.        I: Monitored vitals and assessed pt status.   Running:SL  PRN:tylenol and xycodone for neuropathy/carpal tunnel in hands    A: A0x4. VSS, on RA. SR/ST 1st AVB/BBB. Afebrile. Adequate UO, 02 Blood sugars WNL. LVAD values WNL.     I/O this shift:  In: -   Out: 325 [Urine:325]    Temp:  [98  F (36.7  C)-99  F (37.2  C)] 98.5  F (36.9  C)  Pulse:  [66-93] 79  Resp:  [16-20] 18  BP: (66-96)/(39-81) 93/57  Cuff Mean (mmHg):  [64-89] 72  SpO2:  [92 %-98 %] 98 %      P: Continue to monitor Pt status and report changes to treatment team.

## 2021-02-13 NOTE — PLAN OF CARE
Neuro: A&Ox4.   Cardiac: Afebrile, VSS, NSR with 1st degree block. HM2 without alarms, Dressing changed, no drainage.  Respiratory: RA. Feels mucinex is helping with congestion. IS encouraged.  GI/: Voiding spontaneously. Last BM 2/11. PRN miralax given.  Diet/appetite: Tolerating 2gm Na diet. Denies nausea.  Activity: Up ad francisco.   Pain: . Carpal tunnel pain managed with PRN tylenol and oxycodone.  Skin: Intact.  Lines: PIV SL'd after am dose of IV iron.  Replacements: none needed.

## 2021-02-13 NOTE — PROCEDURES
The patient's HeartMate LVAD was interrogated 2/13/2021  * Speed 9600 rpm   * Pulsatility index 3.4-4.4  * Power 6.7-7.3 Manuel   * Flow 6.1-7.5 L/minute   Fluid status: euvolemic   Alarms were reviewed, and notable for several PI events per baseline, some with speed drops.   The driveline exit site was inspected, dressing cdi.   All external components were inspected and showed no evidence of damage or malfunction, none replaced.   No changes to VAD settings made

## 2021-02-14 LAB
ANION GAP SERPL CALCULATED.3IONS-SCNC: 6 MMOL/L (ref 3–14)
BUN SERPL-MCNC: 37 MG/DL (ref 7–30)
CALCIUM SERPL-MCNC: 9.1 MG/DL (ref 8.5–10.1)
CHLORIDE SERPL-SCNC: 99 MMOL/L (ref 94–109)
CO2 SERPL-SCNC: 30 MMOL/L (ref 20–32)
CREAT SERPL-MCNC: 1.72 MG/DL (ref 0.66–1.25)
ERYTHROCYTE [DISTWIDTH] IN BLOOD BY AUTOMATED COUNT: 19.5 % (ref 10–15)
GFR SERPL CREATININE-BSD FRML MDRD: 41 ML/MIN/{1.73_M2}
GLUCOSE BLDC GLUCOMTR-MCNC: 110 MG/DL (ref 70–99)
GLUCOSE BLDC GLUCOMTR-MCNC: 123 MG/DL (ref 70–99)
GLUCOSE BLDC GLUCOMTR-MCNC: 154 MG/DL (ref 70–99)
GLUCOSE BLDC GLUCOMTR-MCNC: 84 MG/DL (ref 70–99)
GLUCOSE SERPL-MCNC: 106 MG/DL (ref 70–99)
HCT VFR BLD AUTO: 36.8 % (ref 40–53)
HGB BLD-MCNC: 10.8 G/DL (ref 13.3–17.7)
INR PPP: 2.26 (ref 0.86–1.14)
MAGNESIUM SERPL-MCNC: 2.4 MG/DL (ref 1.6–2.3)
MCH RBC QN AUTO: 26.5 PG (ref 26.5–33)
MCHC RBC AUTO-ENTMCNC: 29.3 G/DL (ref 31.5–36.5)
MCV RBC AUTO: 90 FL (ref 78–100)
PLATELET # BLD AUTO: 236 10E9/L (ref 150–450)
POTASSIUM SERPL-SCNC: 4.5 MMOL/L (ref 3.4–5.3)
RBC # BLD AUTO: 4.07 10E12/L (ref 4.4–5.9)
SODIUM SERPL-SCNC: 135 MMOL/L (ref 133–144)
WBC # BLD AUTO: 8.7 10E9/L (ref 4–11)

## 2021-02-14 PROCEDURE — 93750 INTERROGATION VAD IN PERSON: CPT | Performed by: NURSE PRACTITIONER

## 2021-02-14 PROCEDURE — 36415 COLL VENOUS BLD VENIPUNCTURE: CPT | Performed by: STUDENT IN AN ORGANIZED HEALTH CARE EDUCATION/TRAINING PROGRAM

## 2021-02-14 PROCEDURE — 85027 COMPLETE CBC AUTOMATED: CPT | Performed by: STUDENT IN AN ORGANIZED HEALTH CARE EDUCATION/TRAINING PROGRAM

## 2021-02-14 PROCEDURE — 250N000013 HC RX MED GY IP 250 OP 250 PS 637: Performed by: INTERNAL MEDICINE

## 2021-02-14 PROCEDURE — 250N000013 HC RX MED GY IP 250 OP 250 PS 637: Performed by: STUDENT IN AN ORGANIZED HEALTH CARE EDUCATION/TRAINING PROGRAM

## 2021-02-14 PROCEDURE — 83735 ASSAY OF MAGNESIUM: CPT | Performed by: STUDENT IN AN ORGANIZED HEALTH CARE EDUCATION/TRAINING PROGRAM

## 2021-02-14 PROCEDURE — 999N001017 HC STATISTIC GLUCOSE BY METER IP

## 2021-02-14 PROCEDURE — 80048 BASIC METABOLIC PNL TOTAL CA: CPT | Performed by: STUDENT IN AN ORGANIZED HEALTH CARE EDUCATION/TRAINING PROGRAM

## 2021-02-14 PROCEDURE — 99232 SBSQ HOSP IP/OBS MODERATE 35: CPT | Mod: 25 | Performed by: NURSE PRACTITIONER

## 2021-02-14 PROCEDURE — 214N000001 HC R&B CCU UMMC

## 2021-02-14 PROCEDURE — 250N000013 HC RX MED GY IP 250 OP 250 PS 637: Performed by: NURSE PRACTITIONER

## 2021-02-14 PROCEDURE — 85610 PROTHROMBIN TIME: CPT | Performed by: STUDENT IN AN ORGANIZED HEALTH CARE EDUCATION/TRAINING PROGRAM

## 2021-02-14 PROCEDURE — 99222 1ST HOSP IP/OBS MODERATE 55: CPT | Performed by: INTERNAL MEDICINE

## 2021-02-14 RX ORDER — WARFARIN SODIUM 10 MG/1
10 TABLET ORAL
Status: COMPLETED | OUTPATIENT
Start: 2021-02-14 | End: 2021-02-14

## 2021-02-14 RX ADMIN — UMECLIDINIUM 1 PUFF: 62.5 AEROSOL, POWDER ORAL at 09:07

## 2021-02-14 RX ADMIN — OXYCODONE HYDROCHLORIDE 10 MG: 5 TABLET ORAL at 20:47

## 2021-02-14 RX ADMIN — ACETAMINOPHEN 975 MG: 325 TABLET, FILM COATED ORAL at 07:00

## 2021-02-14 RX ADMIN — ISOSORBIDE DINITRATE 10 MG: 10 TABLET ORAL at 09:06

## 2021-02-14 RX ADMIN — OXYCODONE HYDROCHLORIDE 10 MG: 5 TABLET ORAL at 11:45

## 2021-02-14 RX ADMIN — POLYETHYLENE GLYCOL 3350 17 G: 17 POWDER, FOR SOLUTION ORAL at 09:23

## 2021-02-14 RX ADMIN — MONTELUKAST 10 MG: 10 TABLET, FILM COATED ORAL at 22:15

## 2021-02-14 RX ADMIN — Medication 10 MG: at 22:14

## 2021-02-14 RX ADMIN — ALLOPURINOL 300 MG: 300 TABLET ORAL at 09:06

## 2021-02-14 RX ADMIN — FLUTICASONE FUROATE AND VILANTEROL TRIFENATATE 1 PUFF: 100; 25 POWDER RESPIRATORY (INHALATION) at 09:07

## 2021-02-14 RX ADMIN — OXYCODONE HYDROCHLORIDE 10 MG: 5 TABLET ORAL at 16:25

## 2021-02-14 RX ADMIN — ACETAMINOPHEN 650 MG: 325 TABLET, FILM COATED ORAL at 00:24

## 2021-02-14 RX ADMIN — GUAIFENESIN 600 MG: 600 TABLET ORAL at 09:07

## 2021-02-14 RX ADMIN — GUAIFENESIN 600 MG: 600 TABLET ORAL at 20:10

## 2021-02-14 RX ADMIN — HYDRALAZINE HYDROCHLORIDE 75 MG: 25 TABLET, FILM COATED ORAL at 09:06

## 2021-02-14 RX ADMIN — DOCUSATE SODIUM 50 MG AND SENNOSIDES 8.6 MG 1 TABLET: 8.6; 5 TABLET, FILM COATED ORAL at 20:10

## 2021-02-14 RX ADMIN — CEFDINIR 300 MG: 300 CAPSULE ORAL at 09:07

## 2021-02-14 RX ADMIN — CEFDINIR 300 MG: 300 CAPSULE ORAL at 20:10

## 2021-02-14 RX ADMIN — WARFARIN SODIUM 10 MG: 10 TABLET ORAL at 18:14

## 2021-02-14 RX ADMIN — BUPROPION HYDROCHLORIDE 150 MG: 150 TABLET, EXTENDED RELEASE ORAL at 09:06

## 2021-02-14 RX ADMIN — BUMETANIDE 4 MG: 2 TABLET ORAL at 16:25

## 2021-02-14 RX ADMIN — OXYCODONE HYDROCHLORIDE 10 MG: 5 TABLET ORAL at 00:23

## 2021-02-14 RX ADMIN — OXYCODONE HYDROCHLORIDE 10 MG: 5 TABLET ORAL at 07:00

## 2021-02-14 RX ADMIN — HYDRALAZINE HYDROCHLORIDE 75 MG: 25 TABLET, FILM COATED ORAL at 20:13

## 2021-02-14 RX ADMIN — ASPIRIN 81 MG CHEWABLE TABLET 81 MG: 81 TABLET CHEWABLE at 09:06

## 2021-02-14 RX ADMIN — POTASSIUM CHLORIDE 60 MEQ: 1500 TABLET, EXTENDED RELEASE ORAL at 09:08

## 2021-02-14 RX ADMIN — HYDRALAZINE HYDROCHLORIDE 75 MG: 25 TABLET, FILM COATED ORAL at 14:28

## 2021-02-14 RX ADMIN — ISOSORBIDE DINITRATE 10 MG: 10 TABLET ORAL at 14:28

## 2021-02-14 RX ADMIN — BUMETANIDE 4 MG: 2 TABLET ORAL at 09:06

## 2021-02-14 RX ADMIN — ACETAMINOPHEN 650 MG: 325 TABLET, FILM COATED ORAL at 20:47

## 2021-02-14 RX ADMIN — GABAPENTIN 300 MG: 300 CAPSULE ORAL at 09:07

## 2021-02-14 RX ADMIN — ISOSORBIDE DINITRATE 10 MG: 10 TABLET ORAL at 20:13

## 2021-02-14 RX ADMIN — GABAPENTIN 300 MG: 300 CAPSULE ORAL at 14:28

## 2021-02-14 RX ADMIN — AMIODARONE HYDROCHLORIDE 200 MG: 200 TABLET ORAL at 09:06

## 2021-02-14 RX ADMIN — ACETAMINOPHEN 975 MG: 325 TABLET, FILM COATED ORAL at 14:28

## 2021-02-14 RX ADMIN — GABAPENTIN 600 MG: 300 CAPSULE ORAL at 22:14

## 2021-02-14 ASSESSMENT — ACTIVITIES OF DAILY LIVING (ADL)
ADLS_ACUITY_SCORE: 12

## 2021-02-14 ASSESSMENT — MIFFLIN-ST. JEOR
SCORE: 1766.51
SCORE: 1789.64

## 2021-02-14 NOTE — PROVIDER NOTIFICATION
While pt sleeping he rolled out of bed onto the floor. He said he fell onto his left hip. Hip is not bruised or red. He denies hitting his head. He is A & O X4, He declines having his side rail up.   Dr. Yao notified. Falls paperwork completed.  P: Continue to monitor.

## 2021-02-14 NOTE — PROCEDURES
The patient's HeartMate LVAD was interrogated 2/14/2021  * Speed 9600 rpm   * Pulsatility index 3.9-4.4  * Power 6.7-7.3 Manuel   * Flow 6.1-6.7 L/minute   Fluid status: euvolemic   Alarms were reviewed, and notable for several PI events per baseline, some with speed drops.   The driveline exit site was inspected, dressing cdi.   All external components were inspected and showed no evidence of damage or malfunction, none replaced.   No changes to VAD settings made

## 2021-02-14 NOTE — CONSULTS
[facility]  Transplant Infectious Disease Consult Note - New Patient     Patient:  Jim Willingham, Date of birth 1955, Medical record number [id number]  Date of Visit:  02/14/2021  Consult requested by Jadon Sears / Rose Conte for evaluation of rhinovirus infection while awaiting imminent heart transplant.         Assessment and Recommendations:   Recommendations:  - Would try to hold off from transplant until 2/19/21 (a week from his positive rhinovirus PCR), after which he is cleared from an ID perspective to proceed to transplant at any point thereafter.  - Discontinue cefdinir now -- a five to seven day pre-emptive course (given a history of URI-induced purulent bronchitis) ought to be plenty sufficient.  - Would not give any additional antimicrobials or pursue additional ID work-up now.  - Does not need additional blood cultures.  - Would not repeat the respiratory virus PCR panel again anytime soon -- ongoing asymptomatic rhinovirus nucleic acid PCR-positive shedding is quite likely (and will be highly likely if he is transplant-immunosuppressed soon).  Could consider repeating the nasopharyngeal assay in about three weeks (~ 3/5/21) to see if he has luckily stopped shedding by then, solely for the purpose of permitting discontinuation of respiratory droplet inpatient isolation, unless Infection Control says that rechecking the assay is unnecessary.  - Would give a Prevnar 13 vaccine now.  Could discuss giving the first dose of Shingrix vaccination to the patient now as well (not a live virus so not a transplant risk).  - Could consider initiating a Twinrix vaccine series now, although he is probably at low risk for both hepatitis A and B virus infections by virtue of his lifestyle.      Thanks for the consult on this complex and personable gentleman. Transplant ID will not follow with you, but please page if additional ID questions or concerns arise.    Leonidas  MD Johan  Pager [phone]    Assessment:  A 65 year old gentleman with non-ischemic cardiomyopathy resulting in a LVEF of 20% complicated by ventricular tachycardia requiring AICD placement a number of years ago, followed by Heartmate 2 LVAD placement on 6/19/17, initially as destination therapy, but with major weight loss after morbid obesity now with a LVAD designated as a bridge to transplant.  He also has a history of atrial fibrillation s/p DCCV on amiodarone and warfarin, well-controlled type 2 dibetes mellitus on chronic insulin, Staph hominis bacteremia, influenza B infection in 1/20, stage three chronic kidney disease, hypertension, left knee gout, bronchitic COPD, asthma, and obstructive sleep apnea.  He is now readmitted to the [facility] Cardiology 2 service on 2/8/21 PM with rapidly worsening exertional dyspnea and fatigue of decompensated heart failure as well as symptoms of sore throat, clear rhinorrhea, and nasal congestion starting on about 2/7/21.  A nasopharyngeal PCR panel from 2/12/21 detected human rhinovirus infection.  His upper respiratory symptoms are now improved and already seem close to resolution.    In general, rhinovirus is not a significant threat, even to most individuals on immunosuppression post-transplant.  Immunocompetent persons and -- much more so -- immunosuppressed hosts can shed rhinovirus nucleic acid by PCR assays for weeks (and in post-transplant, potentially years) after the primary infection, but once an acute rhinovirus infection is over its initial symptomatic period during the first week or two, it essentially never causes subsequent active disease or proves to be pathogenic, even if there is prolonged ongoing PCR-detectable nucleic acid shedding.  Coupling an acute symptomatic rhinovirus infection with acute immunosuppression induction could be a potential risk for significant lower respiratory tract infection / viral pneumonitis, so, in his case, it is prudent to  wait a week from his peak URI symptomaticity before starting transplant-required immune suppression.  Hence, to be prudent, would recommend waiting until 1/19/21 (one week from his peak symptoms and his positive 1/12/21 PCR test) before transplanting him.  By that time, he will be fully URI-symptom free, which he almost is already.  After that, he can be transplanted at any point without concern.  There are, of course, no therapeutic options for rhinovirus, so he does not need any additional interventions now beyond continued waiting.    Based on his history of past URI-exacerbated asthma / purulent bronchitis flares, the course of pre-emptive cefdinir is quite reasonable (although perhaps a less broad-spectrum oral beta-lactam would be preferable).  But a ten day course seems excessive.  A five to seven day course seems more appropriate.    Old ID issues:  - Resolved Staph hominis bacteremia 9/17 - 18/20:  Treated.  Four negative surveillance blood cultures 9/20 - 2/20 and one more negative on 11/4/20.    Other ID issues:  - QTc interval:  Has an LVAD.  - Immunization status:  History in Epic / [facility] registry may be incomplete, but no record of pneumococcal or Shingrix immunization.  Would try to give at least a Prevnar 13 initial vaccine now before transplant, and probably also a first Shingrix dose.  Will need a Pneumovax 23 vaccine three to six months post-transplant.  He is HAV / HBV seronegative, so could consider initiating a Twinrix vaccine series, although he is probably at low risk for both by virtue of his lifestyle.  - Isolation status: Respiratory droplet isolation for rhinovirus.         History of Infectious Disease Illness:   Mr. Willingham is a very pleasant 65 year old gentleman with non-ischemic cardiomyopathy resulting in a LVEF of 20% complicated by ventricular tachycardia requiring AICD placement a number of years ago, followed by Heartmate 2 LVAD placement on 6/19/17, initially as destination  therapy, but with major weight loss after morbid obesity now with a LVAD designated as a bridge to transplant.  He also has a history of atrial fibrillation s/p DCCV on amiodarone and warfarin, well-controlled type 2 dibetes mellitus on chronic insulin, Staph hominis bacteremia, influenza B infection in 1/20, stage three chronic kidney disease,hypertension, left knee gout, bronchitic COPD, asthma, and obstructive sleep apnea.  He is presently listed as status 2 on the heart transplant waiting list.  He was previously hospitalized on the [facility] Cardiology 2 service from 1/7 - 11/21 with a decompensated volume overload exacerbation of his heart failure which responded to diuresis.  He did well at home for several weeks, but was seen in [facility] on 1/21/21 with increased edema necessitating and increase in his Bumex dose.  The edema improved, but on 2/5/21 his creatinine was elevated to 2.22 (from a baselin of 1.8), so the Bumex dose was decreased to the previous level.  Unfortunately, his exertional dyspnea and fatigued worsened, so he was subsequently now readmitted to the [facility] Cardiology 2 service on 2/8/21 PM with rapidly worsening exertional dyspnea and fatigue with inability to perform ADLs because of that.  He also had a mild sore throat, clear rhinorrhea, and nasal congestion the day before and at the time of admission.  He says that his two-year old granddaughter came down with profuse rhinorrhea on ~ 1/25/21 after having been in pre-school.  She visited his household and interacted with his seven year old adopted great-granddaughter, who subsequently developed upper respiratory cold symptoms on ~ 2/2/21 which caused her to miss school for several days (although she became asymptomatic quickly and returned to school on 2/8/21).  His upper respiratory symptoms started about 2/7/21 and progressed with increased rhinorrhea, nasal congestion, and sore throat over the next three days, such that eventually a  nasopharyngeal respiratory PCR panel was sent on 2/12/21 which resulted positive for human rhinovirus (without any other viruses detected).  He was empirically placed on a one-week (2/8 - 14/21) course of cefdinir 300 mg PO BID upon admission, because he has a history of flares of purulent bronchitis / COPD exacerbations with respiratory virus infections over the past decade+, for which presumptive pre-emptive courses of cefdinir have proven to be protective several times in the past.  He has not received any other antimicrobials in recent weeks.  Now, he reports, his rhinorrhea has resolved, his nasal and mild bimaxillary sinus congestion is nearly resolved, and he has only a slight residual pharyngeal tickle.  His volume status is now thought to be corrected to euvolemic and he says he has pretty good improved energy which is at his baseline of recent months.  Throughout this hospitalization, he has been afebrile (T max 99.1 degrees F) without any chills or sweats and has had a normal appetite, without any recent other EENT symptoms, significant cough, wheezing, chest pain, lightheadedness, rash or skin changes, nausea, vomiting, diarrhea, new arthralgias / myalgias, headache or other neurological symptoms.  He lacks other sick contact exposures and says that, until now, he had not had even a slight cold since the onset of the Covid-19 pandemic restrictions in 3/20.  He has never had a significant LVAD-related difficulty.  Transplant ID was consulted to evaluate the implications of his rhinovirus infection for his eligibility for transplant very soon.    Review of Systems:  CONSTITUTIONAL:  No fever, chills, or sweats.  Prior fatigue is improved.  No anorexia.  Fell out of bed last night without sequelae.  EYES: No eye pain, visual changes, or scleral icterus.  ENT:  Recent rhinorrhea, sinus fullness, nasal congestion, and sore throat.  No otalgia, hearing loss, tinnitus, or oral pain.  LYMPH:  Chronic dependent  edema, now compensated and improved.  RESPIRATORY:  Exertional dyspnea; no resting dyspnea.  Minimal admission dry cough mostly resolved, increased clear sputum improved.  COPD / asthma..  CARDIOVASCULAR:  Bucmup-pz-otnrhrcexh LVAD since 2017.  Chronic atrial fibrillation on amiodarone and warfarin.  No recent chest pain, palpitations.  GASTROINTESTINAL:  No abdominal pain, nausea, vomiting, diarrhea or constipation.  GENITOURINARY:  Resolved acute on chronic renal insufficiency, now at baseline creatinine ~ 1.6 - 1.7.  No dysuria.  HEME:  No anemia.  No easy bruising.  ENDOCRINE:  Well-controlled type 2 diabetes mellitus on chronic insulin.  MUSCULOSKELETAL:  Recent bilateral carpal tunnel numbness / painful paresthesias.  No other myalgias / arthralgias.  History of knee gout flares.  SKIN:  No rash or pruritus.  NEURO:  No headache.  PSYCH:  Well-compensated history of depression.    Past Medical History:   Diagnosis Date     Benign essential hypertension 5/11/2017     Bilateral carpal tunnel syndrome 11/2/2020     Cardiomyopathy, unspecified (H) 5/8/2017     CKD (chronic kidney disease) stage 3, GFR 30-59 ml/min 5/11/2017     COPD (chronic obstructive pulmonary disease) (H) 11/2/2020     Depression 5/11/2017     Diabetes mellitus (H) 5/11/2017     Gouty arthropathy, chronic, without tophi 11/2/2020     H/O gastric bypass 5/11/2017     ICD (implantable cardioverter-defibrillator), biventricular, in situ 5/11/2017     LVAD (left ventricular assist device) present (H)      Major depression, recurrent, chronic (H) 11/2/2020     NICM (nonischemic cardiomyopathy) (H)/ EF 20% 5/11/2017    ECHO: LVEDd. 7.66 cm, Restrictive pattern , Severe mitral valve regurgitation     CECILIA (obstructive sleep apnea) 5/11/2017     Paroxysmal atrial fibrillation (H) 5/11/2017     Paroxysmal VT (H) 5/11/2017     Rotator cuff tear arthropathy of both shoulders 11/2/2020     Uncomplicated asthma      Vitamin B12 deficiency (non anemic)  2017     Past Surgical History:   Procedure Laterality Date     ANESTHESIA CARDIOVERSION N/A 2020    Procedure: ANESTHESIA, FOR CARDIOVERSION @1100;  Surgeon: GENERIC ANESTHESIA PROVIDER;  Location: [facility] OR     CV RIGHT HEART CATH MEASUREMENTS RECORDED N/A 2019    Procedure: CV RIGHT HEART CATH;  Surgeon: Renu Sears MD;  Location:  HEART CARDIAC CATH LAB     CV RIGHT HEART CATH MEASUREMENTS RECORDED N/A 2020    Procedure: CV RIGHT HEART CATH;  Surgeon: Nicola Seth MD;  Location:  HEART CARDIAC CATH LAB     CV RIGHT HEART CATH MEASUREMENTS RECORDED N/A 2021    Procedure: CV RIGHT HEART CATH;  Surgeon: Jac Dover MD;  Location:  HEART CARDIAC CATH LAB     GI SURGERY      Sylvester en Y     INSERT VENTRICULAR ASSIST DEVICE LEFT (HEARTMATE II) N/A 2017    Procedure: INSERT VENTRICULAR ASSIST DEVICE LEFT (HEARTMATE II);  Median Sternotomy Heartmate II Left Ventricular Assist Device Insertion on Pump Oxygenator;  Surgeon: Ronnie Quigley MD;  Location:  OR     ORTHOPEDIC SURGERY      right knee wired     PICC DOUBLE LUMEN PLACEMENT Right 2020    5FR PICC DL. Length-43cm (1cm out).     Family History   Problem Relation Age of Onset     Cerebrovascular Disease Mother 64     Diabetes Mother      Hypertension Mother      Coronary Artery Disease Father      Diabetes Type 2  Father      Obesity Brother      Obesity Brother      Cerebrovascular Disease Daughter 40     Social History     Tobacco Use     Smoking status: Former Smoker     Quit date:      Years since quittin.1     Smokeless tobacco: Never Used   Substance Use Topics     Alcohol use: No     Drug use: No     Immunization History   Administered Date(s) Administered     Influenza Quad, Recombinant, p-free (RIV4) 2020     Influenza, Quad, High Dose, Pf, 65yr + 2020     Tdap (Adacel,Boostrix) 2020   Retired (due to cardiac disability) respiratory therapist, 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   -- his wife is also a respiratory therapist.  Lives with wife and adopted seven-year-old great-granddaughter who provides him with a motivation to live until she is \"in college\".    Patient Active Problem List   Diagnosis     Paroxysmal atrial fibrillation (H)     Type 2 diabetes mellitus with complication, with long-term current use of insulin (H)     Stage 3b chronic kidney disease     H/O gastric bypass     CECILIA (obstructive sleep apnea)     Vitamin B12 deficiency (non anemic)     Paroxysmal VT (H)     ICD (implantable cardioverter-defibrillator), [employer] Biventricular ICD - Not Dependent     NICM (nonischemic cardiomyopathy) (H)/ EF 20%     Heart failure (H)     LVAD (left ventricular assist device) present (H)     Long-term (current) use of anticoagulants [Z79.01]     Physical deconditioning     Chronic systolic congestive heart failure (H)     Iron deficiency anemia     Influenza     Dyspnea     Acute-on-chronic kidney injury (H)     Shortness of breath     WALTER (acute kidney injury) (H)     Bacteremia     Class 2 severe obesity due to excess calories with serious comorbidity and body mass index (BMI) of 35.0 to 35.9 in adult (H)     Bilateral carpal tunnel syndrome     Rotator cuff tear arthropathy of both shoulders     COPD (chronic obstructive pulmonary disease) (H)     Major depression, recurrent, chronic (H)     Gouty arthropathy, chronic, without tophi     Right carpal tunnel syndrome     Decompensated heart failure (H)          Current Medications & Allergies:       allopurinol  300 mg Oral Daily     amiodarone  200 mg Oral Daily     aspirin  81 mg Oral or Feeding Tube Daily     bumetanide  4 mg Oral Daily     bumetanide  4 mg Oral Daily     buPROPion  150 mg Oral QAM     cefdinir  300 mg Oral BID     fluticasone-vilanterol  1 puff Inhalation Daily     gabapentin  300 mg Oral BID     gabapentin  600 mg Oral At Bedtime     " guaiFENesin  600 mg Oral BID     hydrALAZINE  75 mg Oral TID     insulin aspart  1-3 Units Subcutaneous TID AC     insulin aspart  1-3 Units Subcutaneous At Bedtime     isosorbide dinitrate  10 mg Oral TID     montelukast  10 mg Oral At Bedtime     polyethylene glycol  17 g Oral Daily     potassium chloride  60 mEq Oral Daily     senna-docusate  1 tablet Oral At Bedtime     umeclidinium  1 puff Inhalation Daily     warfarin ANTICOAGULANT  10 mg Oral ONCE at 18:00     Infusions/Drips:      - MEDICATION INSTRUCTIONS -       - MEDICATION INSTRUCTIONS -       ACE/ARB/ARNI NOT PRESCRIBED       Warfarin Therapy Reminder       Allergies   Allergen Reactions     Beer Shortness Of Breath     Grass Shortness Of Breath     Ace Inhibitors Cough     Dust Mites Other (See Comments)     Asthma     Mold Other (See Comments)     Asthma     Penicillins Other (See Comments)     Unknown - childhood exposure    Tolerated Zosyn 9/18-9/20/2020     Sulfa Drugs Other (See Comments) and Unknown     Unknown childhood reaction          Physical Exam:     Patient Vitals for the past 24 hrs:   BP Temp Temp src Pulse Resp SpO2 Weight   02/14/21 1108 (!) 80/68 98.3  F (36.8  C) Oral 81 20 96 % --   02/14/21 0743 (!) 86/78 97.9  F (36.6  C) Oral 99 20 97 % --   02/14/21 0600 -- -- -- -- -- -- 99.7 kg (219 lb 12.8 oz)   02/14/21 0400 -- -- -- 71 20 -- --   02/14/21 0027 -- -- -- -- -- -- 102 kg (224 lb 14.4 oz)   02/13/21 2315 (!) 89/76 97.9  F (36.6  C) Axillary 71 18 98 % --   02/13/21 1907 92/74 98.2  F (36.8  C) Oral 69 18 100 % --   02/13/21 1554 (!) 85/67 98  F (36.7  C) Oral 72 18 97 % --   02/13/21 1423 101/78 -- -- 70 -- -- --     Ranges for vital signs over the past 24 hours:   Temp:  [97.9  F (36.6  C)-98.3  F (36.8  C)] 98.3  F (36.8  C)  Pulse:  [69-99] 81  Resp:  [18-20] 20  BP: ()/(67-78) 80/68  SpO2:  [96 %-100 %] 96 %  Vitals:    02/13/21 0150 02/14/21 0027 02/14/21 0600   Weight: 101.3 kg (223 lb 6.4 oz) 102 kg (224 lb 14.4  oz) 99.7 kg (219 lb 12.8 oz)     Intake/Output Summary (Last 24 hours) at 2/14/2021 1220  Last data filed at 2/14/2021 1109  Gross per 24 hour   Intake 1390 ml   Output 4145 ml   Net -2755 ml     Physical Examination:  GENERAL:  A pleasant, conversant, well-developed, well-nourished, 65 year old man sitting up in a chair in street clothes talking a length in no discomfort  HEAD:  Normocephalic, atraumatic.  EYES:  EOMI, PERRL, anicteric sclerae without conjunctival injection.  ENT:  External auditory canals are patent without discharge.  Oropharynx is moist without exudates or ulcers.  NECK:  Supple.  LYMPH:  No cervical or supraclavicular lymphadenopathy  LUNGS:  Clear to auscultation bilaterally above LVAD thrum  CARDIOVASCULAR:  Regular rate and rhythm, LVAD thrum.  ABDOMEN:  Normal bowel sounds, soft, nontender, no hepatosplenomegaly, LVAD entry site dressed without evident tenderness or marginal erythema.  BACK:  No CVA tenderness.  :  No Russell.  EXTREMITIES:  Distally warm, 1+ bipedal edema.  SKIN:  No acute rash or lesion.  Peripheral IV line site lacks inflammation.  NEUROLOGIC:  Alert, oriented, moves extremities x 4.         Laboratory Data:     No results found for: ACD4    Inflammatory Markers    Recent Labs   Lab Test 10/27/20  1250 10/20/20  1305 10/13/20  1320 09/10/20  0830 09/10/20  0830   SED 10 8 10   < >  --    CRP 3.5 11.0* 12.0*   < >  --    PSA  --   --   --   --  0.35    < > = values in this interval not displayed.     Immune Globulin Studies     Recent Labs   Lab Test 12/21/20  1133      IGM 55        Metabolic Studies       Recent Labs   Lab Test 02/14/21  0604 02/13/21  0608 01/11/21  0536 01/11/21  0536 01/07/21  0719 01/07/21  0719 08/05/20  0629 08/05/20  0629 01/21/20  0639 01/21/20  0639 01/21/20  0011    132*   < > 135   < > 134   < >  --    < > 131* 131*   POTASSIUM 4.5 4.2   < > 3.8   < > 4.2   < >  --    < > 4.9 5.1   CHLORIDE 99 98   < > 99   < > 98   < >   --    < > 97 97   CO2 30 28   < > 28   < > 26   < >  --    < > 25 23   ANIONGAP 6 6   < > 8   < > 10   < >  --    < > 10 11   BUN 37* 37*   < > 50*   < > 61*   < >  --    < > 45* 45*   CR 1.72* 1.75*   < > 1.69*   < > 2.04*   < >  --    < > 1.19 1.28*   GFRESTIMATED 41* 40*   < > 42*   < > 34*   < >  --    < > 65 59*   * 94   < > 120*   < > 136*   < >  --    < > 399* 486*   A1C  --   --   --   --   --  6.2*   < >  --    < >  --  7.0*   QAMAR 9.1 8.8   < > 8.6   < > 9.1   < >  --    < > 8.2* 8.1*   PHOS  --   --   --  3.8   < >  --   --   --   --   --   --    MAG 2.4* 2.4*   < > 2.6*   < >  --    < >  --    < > 1.8 1.7   LACT  --   --   --   --   --   --   --  1.1  --  1.9  --    PCAL  --   --   --   --   --   --   --   --   --   --  0.48    < > = values in this interval not displayed.       Hepatic Studies    Recent Labs   Lab Test 02/13/21  0608 02/12/21  0525 02/10/21  0539 02/05/21  0755 02/05/21  0755   BILITOTAL 0.6  --  0.6   < >  --    DBIL 0.2  --  0.2   < >  --    ALKPHOS 97  --  108   < >  --    PROTTOTAL 6.5*  --  6.6*   < >  --    ALBUMIN 3.2*  --  3.3*   < >  --    AST 28  --  38   < >  --    ALT 40  --  47   < >  --    LDH  --  293*  --   --  332*    < > = values in this interval not displayed.       Pancreatitis testing    Recent Labs   Lab Test 11/05/20  0907 08/05/20  0335   LIPASE  --  168   TRIG 91  --        Gout Labs      Recent Labs   Lab Test 02/01/21  1127 01/09/21  0538 12/21/20  1133 11/30/20  0808 11/05/20  0907   URIC 3.9 5.0 4.5 3.9 4.9       Hematology Studies      Recent Labs   Lab Test 02/14/21  0604 02/13/21  0608 02/12/21  0925 02/01/21  1127 01/19/21  1245 01/09/21  0538 01/08/21  0410 01/07/21  0719 01/07/21  0719   WBC 8.7 12.1* 5.5 7.0 9.0 5.8 5.7  --  7.5   ANEU  --   --   --   --   --   --  4.0  --  5.2   ALYM  --   --   --   --   --   --  1.0  --  1.5   VIJAY  --   --   --   --   --   --  0.6  --  0.7   AEOS  --   --   --   --   --   --  0.0  --  0.0   HGB 10.8* 9.9*  10.9* 10.1* 11.1* 9.7* 10.4*   < > 10.2*   HCT 36.8* 32.7* 35.8* 33.8* 37.2* 32.5* 36.3*  --  33.7*    234 248 236 241 219 232  --  219    < > = values in this interval not displayed.       Clotting Studies    Recent Labs   Lab Test 02/14/21  0604 02/13/21  0608 02/12/21  0525 02/11/21  0614 09/17/20  1554 09/17/20  1554   INR 2.26* 2.34* 2.21* 2.22*   < > 3.68*   PTT  --   --   --   --   --  51*    < > = values in this interval not displayed.       Iron Testing    Recent Labs   Lab Test 02/14/21  0604 02/09/21  0518 02/09/21  0518 01/22/21  0958 01/22/21  0958 01/21/21  1350 09/10/20  0830 09/10/20  0830   IRON  --   --  28*  --   --  36  --  29*   FEB  --   --  285  --   --  381  --  294   IRONSAT  --   --  10*  --   --  10*  --  10*   VITA  --   --  33  --   --   --   --   --    MCV 90   < >  --    < >  --   --    < > 89   B12  --   --   --   --  450  --   --   --     < > = values in this interval not displayed.       Markers  No lab results found.    Invalid input(s): FETOPROTEIN, SERUM, AFP    Autoimmune Testing  No lab results found.    Invalid input(s): MPO, PRO3, C3, C4, VASPAN    Arterial Blood Gas Testing    Recent Labs   Lab Test 01/08/21  1409 01/08/21  0800 01/08/21  0358 01/07/21 2020 06/22/17  0415 06/22/17  0415 06/21/17  1559 06/21/17  0900 06/21/17  0657 06/21/17  0359   PH  --   --   --   --   --  7.35 7.34* 7.39 7.37 7.38   PCO2  --   --   --   --   --  43 43 33* 37 41   PO2  --   --   --   --   --  84 114* 124* 137* 136*   HCO3  --   --   --   --   --  24 23 20* 22 24   O2PER 21 21 21 21   < > 3L  3L 4L 4L 40 40%  40%    < > = values in this interval not displayed.        Thyroid Studies     Recent Labs   Lab Test 02/01/21  1127 12/18/20  1018 06/24/20  0747 07/31/19  1050 07/03/19  0828   TSH 3.52 3.45 1.30 1.23 0.82       Urine Studies     Recent Labs   Lab Test 09/10/20  0921 01/16/20  0503 02/12/19  0142 05/23/17  2100   URINEPH 6.5 5.5 6.0 5.5   NITRITE Negative Negative Negative  Negative   LEUKEST Negative Negative Negative Negative   WBCU 0 - 5 <1  --  <1       Medication levels    Recent Labs   Lab Test 10/27/20  1250   VANCOMYCIN 19.3       CSF testing   No lab results found.    Invalid input(s): CADAM, EVPCR, ENTPCR, ENTEROVIRUS    Body fluid stats    Recent Labs   Lab Test 01/21/20  0424   GS <10 Squamous epithelial cells/low power field  >25 PMNs/low power field  Many  Gram negative coccobacilli  *  Few  Mixed gram positive jaxon         Microbiology:  Fungal testing  No lab results found.    Invalid input(s): HIFUN FUNGL    Last Culture results with specimen source  Culture Micro   Date Value Ref Range Status   11/04/2020 No growth  Final   09/23/2020 No growth  Final   09/22/2020 No growth  Final   09/21/2020 No growth  Final   09/20/2020 No growth  Final   09/19/2020 No growth  Final   09/18/2020 (A)  Final    Cultured on the 2nd day of incubation:  Staphylococcus hominis  Susceptibility testing done on previous specimen     09/18/2020   Final    Critical Value/Significant Value, preliminary result only, called to and read back by  Tamy Leventhal RN [id number] 9/20/20 AM     09/18/2020 No growth  Final   09/17/2020 (A)  Final    Cultured on the 1st day of incubation:  Staphylococcus hominis     09/17/2020   Final    Critical Value/Significant Value, preliminary result only, called to and read back by  John Howard RN @1414 09/18/2020 hd/lm     09/17/2020   Final    (Note)  POSITIVE for Staphylococci other than S.aureus, S.epidermidis and  S.lugdunensis, by Momoigene multiplex nucleic acid test.  Coagulase-negative staphylococci are the most common venipuncture or  collection associated skin CONTAMINANTS grown in blood cultures.  Final identification and antimicrobial susceptibility testing will be  verified by standard methods.    Specimen tested with Verigene multiplex, gram-positive blood culture  nucleic acid test for the following targets: Staph aureus, Staph  epidermidis, Staph  lugdunensis, other Staph species, Enterococcus  faecalis, Enterococcus faecium, Streptococcus species, S. agalactiae,  S. anginosus grp., S. pneumoniae, S. pyogenes, Listeria sp., mecA  (methicillin resistance) and Lucía/B (vancomycin resistance).    Critical Value/Significant Value called to and read back by Le Carmona RN. @1712. 9.18.20. BS.      09/17/2020 No growth  Final   01/21/2020 No growth  Final   01/21/2020 (A)  Final    Heavy growth  Haemophilus influenzae  Beta lactamase negative  Beta-lactamase negative Haemophilus influenzae are usually susceptible to ampicillin,   amoxacillin/clavulanic acid, levofloxacin, and 3rd generation cephalosporins, such as   ceftriaxone.     01/20/2020 No growth  Final   01/20/2020 No growth  Final   01/16/2020 (A)  Final    10,000 to 50,000 colonies/mL  Coagulase negative Staphylococcus     01/15/2020 No growth  Final    Specimen Description   Date Value Ref Range Status   11/04/2020 Blood Unspecified Site  Final   09/23/2020 Blood Left Arm  Final   09/22/2020 Blood Left Arm  Final   09/21/2020 Blood Left Arm  Final   09/20/2020 Blood Left Arm  Final   09/19/2020 Blood Left Arm  Final   09/18/2020 Blood Right Arm  Final   09/18/2020 Blood Right Arm  Final   09/17/2020 Blood Right Arm  Final   09/17/2020 Blood Right Arm  Final   01/21/2020 Catheterized Urine  Final   01/21/2020 Sputum  Final   01/21/2020 Sputum  Final   01/20/2020 Blood Unspecified Site  Final   01/20/2020 Blood Unspecified Site  Final   01/16/2020 Unspecified Urine  Final   01/15/2020 Blood Right Arm  Final   01/15/2020 Blood Unspecified Site  Final   04/13/2018 Blood Right Arm  Final        Last check of C difficile  No results found for: CDBPCT    Syphilis Testing    Treponema Antibodies   Date Value Ref Range Status   09/10/2020 Nonreactive NR^Nonreactive Final     Comment:     Methodology Change: Test performed on the [employer] Liaison XL by Treponema   pallidum Total Antibodies Assay as of  3.17.2020.         Quantiferon testing   Recent Labs   Lab Test 01/08/21  0410 01/07/21  0719 09/10/20  0830 09/10/20  0830   TBRST  --   --   --  Negative   LYMPH 17.1 19.6   < >  --     < > = values in this interval not displayed.       Virology:  Coronavirus-19 testing    Recent Labs   Lab Test 02/08/21  2315 01/04/21  0947 09/17/20  1600 08/05/20  0938 08/01/20  1126   [id number] Nasopharyngeal Nasopharyngeal Nasopharyngeal Nasopharyngeal  --    SARSCOVRES NEGATIVE NEGATIVE NEGATIVE NEGATIVE  --    [id number]  --  Nasopharyngeal Nasopharyngeal Nasopharyngeal Nasopharyngeal   [id number]  --  Test received-See reflex to IDDL test SARS CoV2 (COVID-19) Virus RT-PCR Test received-See reflex to IDDL test SARS CoV2 (COVID-19) Virus RT-PCR Test received-See reflex to IDDL test SARS CoV2 (COVID-19) Virus RT-PCR Not Detected       Respiratory virus (non-coronavirus-19) testing    Recent Labs   Lab Test 02/12/21  0915 01/15/20  2210   AFLU  --  Negative   IFLUA Not Detected Not Detected   FLUAH1 Not Detected Not Detected   UN1585 Not Detected Not Detected   FLUAH3 Not Detected Not Detected   BFLU  --  Positive*   IFLUB Not Detected Detected, Abnormal Result*   PIV1 Not Detected Not Detected   PIV2 Not Detected Not Detected   PIV3 Not Detected Not Detected   PIV4 Not Detected Not Detected   RSVA Not Detected Not Detected   RSVB Not Detected Not Detected   HMPV Not Detected Not Detected   ADENOV Not Detected Not Detected   CORONA Not Detected Not Detected       CMV viral loads  No lab results found.    No results found for: CMVLOG    No results found for: H6RES    No results found for: EBRES    No results found for: 60865, BKRES    Parvovirus Testing  No lab results found.    Invalid input(s): PRVRES    Adenovirus Testing  No lab results found.    Invalid input(s): ADENAB, ADAG, ADENOVIRUS, ADQT    Hepatitis B Testing     Recent Labs   Lab Test 09/10/20  0830   AUSAB 0.00   HBCAB Nonreactive   HEPBANG Nonreactive         Hepatitis C Antibody   Date Value Ref Range Status   09/10/2020 Nonreactive NR^Nonreactive Final     Comment:     Assay performance characteristics have not been established for newborns,   infants, and children         CMV Antibody IgG   Date Value Ref Range Status   09/10/2020 >8.0 (H) 0.0 - 0.8 AI Final     Comment:     Positive  Antibody index (AI) values reflect qualitative changes in antibody   concentration that cannot be directly associated with clinical condition or   disease state.       Varicella Zoster Virus Antibody IgG   Date Value Ref Range Status   09/10/2020 1.1 (H) 0.0 - 0.8 AI Final     Comment:     Positive, suggests prev. exposure and probable immunity  Antibody index (AI) values reflect qualitative changes in antibody   concentration that cannot be directly associated with clinical condition or   disease state.       EBV Capsid Antibody IgG   Date Value Ref Range Status   09/10/2020 3.8 (H) 0.0 - 0.8 AI Final     Comment:     Positive, suggests recent or past exposure  Antibody index (AI) values reflect qualitative changes in antibody   concentration that cannot be directly associated with clinical condition or   disease state.       Toxoplasma Antibody IgG   Date Value Ref Range Status   09/10/2020 <3.0 0.0 - 7.1 IU/mL Final     Comment:     Negative- Absence of detectable Toxoplasma gondii IgG antibodies. A negative   result does not rule out acute infection.  The magnitude of the measured result is not indicative of the amount of   antibody present. The concentrations of anti-Toxoplasma gondii IgG in a given   specimen determined with assays from different manufacturers can vary due to   differences in assay methods and reagent specificity.       Herpes Simplex Virus Type 1 IgG   Date Value Ref Range Status   09/10/2020 >8.0 (H) 0.0 - 0.8 AI Final     Comment:     Positive.  IgG antibody to HSV-1 detected.  Antibody index (AI) values reflect qualitative changes in antibody   concentration  that cannot be directly associated with clinical condition or   disease state.       Herpes Simplex Virus Type 2 IgG   Date Value Ref Range Status   09/10/2020 <0.2 0.0 - 0.8 AI Final     Comment:     No HSV-2 IgG antibodies detected.  Antibody index (AI) values reflect qualitative changes in antibody   concentration that cannot be directly associated with clinical condition or   disease state.       Imaging:  No results found for this or any previous visit (from the past 48 hour(s)).

## 2021-02-14 NOTE — PROGRESS NOTES
[facility]   Cardiology II Service / Advanced Heart Failure  Daily Progress Note      Patient: Jim Willingham  MRN: [id number]  Admission Date: 2/8/2021  Hospital Day # 6    Assessment and Plan: Jim Willingham is a 65 year old male with history of NICM EF 20% c/b VT s/p CRT-D s/p HMII LVAD 6/19/2017 originally intended as destination therapy 2/2 obesity however with recent weight loss now candidate for transplantation. He is admitted for worsening functional status and renal function concerning for worsening heart failure. He is now listed status 2E for transplant, extension due 2/22/21.    Today's Plan:  -Continue Bumex 4 mg bid  -Follow-up with ortho Monday re: wrist injections  -Continue prn nebs, muccinex, saline rinses for URI sxs  -Continue prn oxycodone to 5-10 mg prn q4hr, Tylenol, Gabapentin  -daily CBC, BMP    #Chronic systolic heart failure 2/2 NICM (EF 20%)   #s/p HM II LVAD placement (6/19/17) as DT (obesity)  #Listed status 2E for transplant due to multiple LVAD complications  Stage D. NYHA class III. TTE 1/8/21 at 9400rpm, EF<30%m LVIDd 6.8cm, at least mildly reduced RV function, AoV closed without AI, mild-mod eccentric MR, dilated IVC without collapse.     Fluid status: euvolemic, bumetanide 4 mg bid  BB: deferred due to recent decompensations and low cardiac output  ACEi/ARB/Afterload reduction: hydralazine 50 TID, isosorbide dinitrate 10 mg TID   Antiplatelet: ASA 81 mg   Anticoagulation: warfarin dosing per pharmacy, INR goal 2-3, INR 2.3 today  SCD: dual chamber ICD                #Carpal tunnel, bilateral R>L  Worsening symptoms recently; significant numbness and pain. E/o thenar atrophy. Relieved temporarily with injection 11/2020. Surgery was discussed prior to admission. Note: paraproteins normal in the past, defer further amyloid workup.  - ortho consulted for steroid injection while inpatient, recommended minimally invasive surgery for more permanent treatment  - discussed  at transplant meeting, preference to wait on surgery due to wound healing post-transplant/high dose IS  - plan is for US guided steroid injection  - prn apap and oxycodone 5-10 mg q4hr prn  - gabapentin 300, 300, 600 mg at HS    #Hx staph hominis bacteremia  Cleared, no concerns for active infection. Off abx since fall 2020 without symptoms of recurrence.   - will discuss with tx ID whether we need surveillance blood cx    #Recurrent bronchitis  #Rhinovirus  Afebrile, normal WBC. Started on cefdinir 2/5 plan was for 10 d course (he has does this course frequently in the past with relief). COVID negative 2/8. Worsening rhinorrhea and congestion 2/12. Resp PCR +rhinovirus. CXR 2/12 with atelectasis, no over PNA.  - continue Omnicef BID through 2/15 per patient request  - symptomatic treatment with muccinex, saline rinses, nebs prn, IS  - monitor CBC    #WALTER on CKD, improved  Admission Cr. 2 <- 2.22. Improved with decreased Bumex as outpatient. Cr stable 1.6-1.7  - daily BMP     #H/o atrial fibrillation on warfarin  #H/o VT/VF on amiodarone  Currently in NSR.   - continue warfarin as above  - continue amiodarone 200 mg daily (decreased per Dr Knott note from 2/5 given transplant listing)     Chronic conditions  #DM type II: glargine 4 unit, medium dose insulin sliding scale  #COPD/Asthma: pta Breo Ellipta, albuterol prn  #Depression: pta Bupropion     Diet: 2g Na 2L FR  DVT Prophylaxis: warfarin  Code Status: full code  Lines: DAWSON Wilder DNP, NP-C  Advanced Heart Failure/Cardiology II Service  Pager [phone] [facility] [id number]    ================================================================    Subjective/24-Hr Events:   Last 24 hr care team notes reviewed. Fell out of bed last night, per pt report fell on L side/L hip area. He has fallen out of bed at home in the past when dreaming. Denies trauma to head. Denies drive line tugging during fall. Feels less bloated today, notes decreased edema in legs as well.  "Denies orthopnea or PND. Rhinorrhea is almost gone now, infrequent cough, no shortness of breath, fevers/chills, sore throat. Using IS throughout the day. Pain in bilateral wrists much more controlled today with increased frequency of Oxy. He notes some sleepiness and slight confusion with this. No dizziness/lightheadedness or other concerning sx. Continues to take Miralax + Senna daily. Adequate UO. Continues to refuse Lantus at at HS, doesn't feel he needs this given control of sugars.    ROS:  4 point ROS including respiratory, CV, GI and  (other than that noted in the HPI) is negative.     Medications: Reviewed in EPIC.     Physical Exam:   BP (!) 86/78 (BP Location: Left arm)   Pulse 99   Temp 97.9  F (36.6  C) (Oral)   Resp 20   Ht 1.727 m (5' 8\")   Wt 99.7 kg (219 lb 12.8 oz)   SpO2 97%   BMI 33.42 kg/m      GENERAL: Appears comfortable, in no distress.  HEENT: Eye symmetrical, no discharge or icterus bilaterally. Mucous membranes moist and without lesions.  NECK: Supple, JVD not visible at 90 degrees.   CV: +mechanical LVAD hum.  RESPIRATORY: Respirations regular, even, and unlabored. Lungs CTA throughout.    GI: Soft and non distended with normoactive bowel sounds present in all quadrants. No tenderness, rebound, guarding.   EXTREMITIES: No peripheral edema.  NEUROLOGIC: Alert and oriented x 3. No focal deficits.   MUSCULOSKELETAL: No joint swelling or tenderness.   SKIN: No jaundice. No rashes or lesions.     Labs:  CMP  Recent Labs   Lab 02/14/21  0604 02/13/21  0608 02/12/21  0525 02/11/21  0614 02/10/21  0539 02/08/21  1659 02/08/21  1659    132* 134 137 137   < > 133   POTASSIUM 4.5 4.2 4.1 4.1 3.6   < > 3.8   CHLORIDE 99 98 101 103 103   < > 100   CO2 30 28 28 29 29   < > 28   ANIONGAP 6 6 6 5 5   < > 5   * 94 103* 91 88   < > 188*   BUN 37* 37* 37* 37* 39*   < > 37*   CR 1.72* 1.75* 1.71* 1.60* 1.67*   < > 1.65*   GFRESTIMATED 41* 40* 41* 45* 43*   < > 43*   GFRESTBLACK 48* 47* " 48* 52* 49*   < > 50*   QAMAR 9.1 8.8 8.8 8.5 8.5   < > 8.5   MAG 2.4* 2.4* 2.7* 2.6* 2.5*   < >  --    PROTTOTAL  --  6.5*  --   --  6.6*  --  6.9   ALBUMIN  --  3.2*  --   --  3.3*  --  3.7   BILITOTAL  --  0.6  --   --  0.6  --  0.5   ALKPHOS  --  97  --   --  108  --  116   AST  --  28  --   --  38  --  38   ALT  --  40  --   --  47  --  47    < > = values in this interval not displayed.     INR  Recent Labs   Lab 02/14/21  0604 02/13/21  0608 02/12/21  0525 02/11/21  0614   INR 2.26* 2.34* 2.21* 2.22*       Time/Communication  I personally spent a total of 25 minutes. Of that 15 minutes was counseling/coordination of patient's care. Plan of care discussed with patient. See my note above for details.    Patient discussed with Dr. Seth.

## 2021-02-14 NOTE — PLAN OF CARE
D: Pt has hx: NICM EF 20% c/b VT s/p CRT-D s/p HMII LVAD 6/19/2017 originally intended as destination therapy 2/2 obesity however with recent weight loss now candidate for transplantation. He is admitted for worsening functional status and renal function concerning for worsening heart failure. He is now listed status 2E for transplant, extension due 2/22/21.         I: Monitored vitals and assessed pt status.   Running:SL  PRN:tylenol and 0xycodone (Q4) for neuropathy/carpal tunnel in hands     A: A0x4. VSS, on RA. SR/ST 1st AVB/BBB. Afebrile. Adequate UO, 02 Blood sugars/vs defered. LVAD values WNL.           P: Continue to monitor Pt status and report changes to treatment team.

## 2021-02-14 NOTE — PLAN OF CARE
Neuro: A&Ox4.   Cardiac: Afebrile, VSS, NSR with 1st degree block. HM2 without alarms, Dressing changed, no drainage.  Respiratory: RA. UR symptoms improving. Self administering IS.  GI/: Voiding spontaneously. Last BM 2/13. Miralax given this am.  Diet/appetite: Tolerating 2gm Na diet. Denies nausea.  Activity: Up ad francisco.   Pain: . Carpal tunnel pain managed with PRN tylenol and oxycodone.  Skin: Intact.  Lines: PIV SL'd.  Replacements: none needed.    Plan: US guided steroid injection for carpal tunnel hopefully tomorrow.  Remains status 2e heart transplant waitlist.

## 2021-02-15 LAB
ANION GAP SERPL CALCULATED.3IONS-SCNC: 7 MMOL/L (ref 3–14)
BUN SERPL-MCNC: 39 MG/DL (ref 7–30)
CALCIUM SERPL-MCNC: 9 MG/DL (ref 8.5–10.1)
CHLORIDE SERPL-SCNC: 99 MMOL/L (ref 94–109)
CO2 SERPL-SCNC: 28 MMOL/L (ref 20–32)
CREAT SERPL-MCNC: 1.65 MG/DL (ref 0.66–1.25)
ERYTHROCYTE [DISTWIDTH] IN BLOOD BY AUTOMATED COUNT: 19.4 % (ref 10–15)
GFR SERPL CREATININE-BSD FRML MDRD: 43 ML/MIN/{1.73_M2}
GLUCOSE BLDC GLUCOMTR-MCNC: 120 MG/DL (ref 70–99)
GLUCOSE BLDC GLUCOMTR-MCNC: 124 MG/DL (ref 70–99)
GLUCOSE BLDC GLUCOMTR-MCNC: 176 MG/DL (ref 70–99)
GLUCOSE SERPL-MCNC: 102 MG/DL (ref 70–99)
HCT VFR BLD AUTO: 35 % (ref 40–53)
HGB BLD-MCNC: 10.7 G/DL (ref 13.3–17.7)
INR PPP: 2.27 (ref 0.86–1.14)
MAGNESIUM SERPL-MCNC: 2.4 MG/DL (ref 1.6–2.3)
MCH RBC QN AUTO: 27.7 PG (ref 26.5–33)
MCHC RBC AUTO-ENTMCNC: 30.6 G/DL (ref 31.5–36.5)
MCV RBC AUTO: 91 FL (ref 78–100)
PLATELET # BLD AUTO: 238 10E9/L (ref 150–450)
POTASSIUM SERPL-SCNC: 4 MMOL/L (ref 3.4–5.3)
RBC # BLD AUTO: 3.86 10E12/L (ref 4.4–5.9)
SODIUM SERPL-SCNC: 134 MMOL/L (ref 133–144)
WBC # BLD AUTO: 5.9 10E9/L (ref 4–11)

## 2021-02-15 PROCEDURE — 214N000001 HC R&B CCU UMMC

## 2021-02-15 PROCEDURE — 83735 ASSAY OF MAGNESIUM: CPT | Performed by: STUDENT IN AN ORGANIZED HEALTH CARE EDUCATION/TRAINING PROGRAM

## 2021-02-15 PROCEDURE — 250N000013 HC RX MED GY IP 250 OP 250 PS 637: Performed by: NURSE PRACTITIONER

## 2021-02-15 PROCEDURE — 36415 COLL VENOUS BLD VENIPUNCTURE: CPT | Performed by: STUDENT IN AN ORGANIZED HEALTH CARE EDUCATION/TRAINING PROGRAM

## 2021-02-15 PROCEDURE — 250N000013 HC RX MED GY IP 250 OP 250 PS 637: Performed by: INTERNAL MEDICINE

## 2021-02-15 PROCEDURE — 85027 COMPLETE CBC AUTOMATED: CPT | Performed by: STUDENT IN AN ORGANIZED HEALTH CARE EDUCATION/TRAINING PROGRAM

## 2021-02-15 PROCEDURE — 93750 INTERROGATION VAD IN PERSON: CPT | Performed by: NURSE PRACTITIONER

## 2021-02-15 PROCEDURE — 250N000013 HC RX MED GY IP 250 OP 250 PS 637: Performed by: STUDENT IN AN ORGANIZED HEALTH CARE EDUCATION/TRAINING PROGRAM

## 2021-02-15 PROCEDURE — 99232 SBSQ HOSP IP/OBS MODERATE 35: CPT | Mod: 25 | Performed by: NURSE PRACTITIONER

## 2021-02-15 PROCEDURE — 85610 PROTHROMBIN TIME: CPT | Performed by: STUDENT IN AN ORGANIZED HEALTH CARE EDUCATION/TRAINING PROGRAM

## 2021-02-15 PROCEDURE — 999N001017 HC STATISTIC GLUCOSE BY METER IP

## 2021-02-15 PROCEDURE — 80048 BASIC METABOLIC PNL TOTAL CA: CPT | Performed by: STUDENT IN AN ORGANIZED HEALTH CARE EDUCATION/TRAINING PROGRAM

## 2021-02-15 RX ORDER — ACETAMINOPHEN 325 MG/1
650 TABLET ORAL EVERY 4 HOURS PRN
Status: DISCONTINUED | OUTPATIENT
Start: 2021-02-15 | End: 2021-02-17

## 2021-02-15 RX ORDER — WARFARIN SODIUM 7.5 MG/1
7.5 TABLET ORAL
Status: COMPLETED | OUTPATIENT
Start: 2021-02-15 | End: 2021-02-15

## 2021-02-15 RX ADMIN — GABAPENTIN 300 MG: 300 CAPSULE ORAL at 14:14

## 2021-02-15 RX ADMIN — ACETAMINOPHEN 650 MG: 325 TABLET, FILM COATED ORAL at 21:04

## 2021-02-15 RX ADMIN — GABAPENTIN 600 MG: 300 CAPSULE ORAL at 21:04

## 2021-02-15 RX ADMIN — BUPROPION HYDROCHLORIDE 150 MG: 150 TABLET, EXTENDED RELEASE ORAL at 08:15

## 2021-02-15 RX ADMIN — OXYCODONE HYDROCHLORIDE 10 MG: 5 TABLET ORAL at 17:01

## 2021-02-15 RX ADMIN — ALLOPURINOL 300 MG: 300 TABLET ORAL at 08:16

## 2021-02-15 RX ADMIN — POLYETHYLENE GLYCOL 3350 17 G: 17 POWDER, FOR SOLUTION ORAL at 23:49

## 2021-02-15 RX ADMIN — GUAIFENESIN 600 MG: 600 TABLET ORAL at 08:15

## 2021-02-15 RX ADMIN — POTASSIUM CHLORIDE 60 MEQ: 1500 TABLET, EXTENDED RELEASE ORAL at 08:15

## 2021-02-15 RX ADMIN — ACETAMINOPHEN 650 MG: 325 TABLET, FILM COATED ORAL at 11:59

## 2021-02-15 RX ADMIN — GUAIFENESIN 600 MG: 600 TABLET ORAL at 19:36

## 2021-02-15 RX ADMIN — ACETAMINOPHEN 650 MG: 325 TABLET, FILM COATED ORAL at 17:00

## 2021-02-15 RX ADMIN — OXYCODONE HYDROCHLORIDE 10 MG: 5 TABLET ORAL at 06:14

## 2021-02-15 RX ADMIN — FLUTICASONE FUROATE AND VILANTEROL TRIFENATATE 1 PUFF: 100; 25 POWDER RESPIRATORY (INHALATION) at 08:15

## 2021-02-15 RX ADMIN — OXYCODONE HYDROCHLORIDE 10 MG: 5 TABLET ORAL at 21:06

## 2021-02-15 RX ADMIN — OXYCODONE HYDROCHLORIDE 10 MG: 5 TABLET ORAL at 11:59

## 2021-02-15 RX ADMIN — ASPIRIN 81 MG CHEWABLE TABLET 81 MG: 81 TABLET CHEWABLE at 08:16

## 2021-02-15 RX ADMIN — HYDRALAZINE HYDROCHLORIDE 75 MG: 25 TABLET, FILM COATED ORAL at 19:41

## 2021-02-15 RX ADMIN — GABAPENTIN 300 MG: 300 CAPSULE ORAL at 08:14

## 2021-02-15 RX ADMIN — BUMETANIDE 4 MG: 2 TABLET ORAL at 17:01

## 2021-02-15 RX ADMIN — ACETAMINOPHEN 975 MG: 325 TABLET, FILM COATED ORAL at 06:14

## 2021-02-15 RX ADMIN — ISOSORBIDE DINITRATE 10 MG: 10 TABLET ORAL at 08:15

## 2021-02-15 RX ADMIN — ISOSORBIDE DINITRATE 10 MG: 10 TABLET ORAL at 19:35

## 2021-02-15 RX ADMIN — HYDRALAZINE HYDROCHLORIDE 75 MG: 25 TABLET, FILM COATED ORAL at 14:14

## 2021-02-15 RX ADMIN — ISOSORBIDE DINITRATE 10 MG: 10 TABLET ORAL at 14:14

## 2021-02-15 RX ADMIN — MONTELUKAST 10 MG: 10 TABLET, FILM COATED ORAL at 21:04

## 2021-02-15 RX ADMIN — BUMETANIDE 4 MG: 2 TABLET ORAL at 08:16

## 2021-02-15 RX ADMIN — HYDRALAZINE HYDROCHLORIDE 75 MG: 25 TABLET, FILM COATED ORAL at 08:15

## 2021-02-15 RX ADMIN — AMIODARONE HYDROCHLORIDE 200 MG: 200 TABLET ORAL at 08:16

## 2021-02-15 RX ADMIN — POLYETHYLENE GLYCOL 3350 17 G: 17 POWDER, FOR SOLUTION ORAL at 08:16

## 2021-02-15 RX ADMIN — UMECLIDINIUM 1 PUFF: 62.5 AEROSOL, POWDER ORAL at 08:15

## 2021-02-15 RX ADMIN — WARFARIN SODIUM 7.5 MG: 7.5 TABLET ORAL at 18:21

## 2021-02-15 ASSESSMENT — ACTIVITIES OF DAILY LIVING (ADL)
ADLS_ACUITY_SCORE: 12

## 2021-02-15 ASSESSMENT — MIFFLIN-ST. JEOR: SCORE: 1771.5

## 2021-02-15 NOTE — PROCEDURES
The patient's HeartMate LVAD was interrogated 2/15/2021  * Speed 9600 rpm   * Pulsatility 6.1-6.8 Manuel   * Flow 5.9-6.7 L/minute   Fluid status: euvolemic   Alarms were reviewed, and notable for several PI events per baseline, few with speed drops.   The driveline exit site was inspected, dressing cdi.   All external components were inspected and showed no evidence of damage or malfunction, none replaced.   No changes to VAD settings made

## 2021-02-15 NOTE — PLAN OF CARE
D: Pt has hx: NICM EF 20% c/b VT s/p CRT-D s/p HMII LVAD 6/19/2017 originally intended as destination therapy 2/2 obesity however with recent weight loss now candidate for transplantation. He is admitted for worsening functional status and renal function concerning for worsening heart failure. He is now listed status 2E for transplant, extension due 2/22/21.         I: Monitored vitals and assessed pt status.   Running:SL  PRN:tylenol and 0xycodone for neuropathy/carpal tunnel in hands     A: A0x4. VSS, on RA. SR/ST 1st AVB/BBB. Afebrile. Adequate UO, 02 Blood sugars/vs deferred overnight. LVAD values WNL.           P: Continue to monitor Pt status and report changes to treatment team. Possible steroid injections to wrists today

## 2021-02-15 NOTE — PLAN OF CARE
D/He is up and about and continues to take prn tylenol and oxycodone for wrist pain. He eats well and takes adequate fluids. He said he felt a little tired yesterday and peed a lot overnight and lost weight today. He continues on po diuretics. He continues in isolation for common cold  A/he says he is better today fluid wise  P/on heart TP list, he may get wrists injected tomorrow

## 2021-02-15 NOTE — PROGRESS NOTES
[facility]   Cardiology II Service / Advanced Heart Failure  Daily Progress Note      Patient: Jim Willingham  MRN: [id number]  Admission Date: 2/8/2021  Hospital Day # 7    Assessment and Plan: Jim Willingham is a 65 year old male with history of NICM EF 20% c/b VT s/p CRT-D s/p HMII LVAD 6/19/2017 originally intended as destination therapy 2/2 obesity however with recent weight loss now candidate for transplantation. He is admitted for worsening functional status and renal function concerning for worsening heart failure. He is now listed status 2E for transplant, extension due 2/22/21.    Today's Plan:  -follow-up with ortho re: timing of wrist injections  -will discuss immunization recs today  -monitor BS off lantus    #Chronic systolic heart failure 2/2 NICM (EF 20%)   #s/p HM II LVAD placement (6/19/17) as DT (obesity)  #Listed status 2E for transplant due to multiple LVAD complications  Stage D. NYHA class III. TTE 1/8/21 at 9400rpm, EF<30%m LVIDd 6.8cm, at least mildly reduced RV function, AoV closed without AI, mild-mod eccentric MR, dilated IVC without collapse.     Fluid status: euvolemic, bumetanide 4 mg bid  BB: deferred due to recent decompensations and low cardiac output  ACEi/ARB/Afterload reduction: hydralazine 50 TID, isosorbide dinitrate 10 mg TID   Antiplatelet: ASA 81 mg   Anticoagulation: warfarin dosing per pharmacy, INR goal 2-3, INR 2.2 today  SCD: dual chamber ICD                #Carpal tunnel, bilateral R>L  Worsening symptoms recently; significant numbness and pain. E/o thenar atrophy. Relieved temporarily with injection 11/2020. Surgery was discussed prior to admission. Note: paraproteins normal in the past, defer further amyloid workup.  - ortho consulted, recommended minimally invasive surgery for more permanent treatment   - discussed at transplant meeting, preference to wait on surgery due to wound healing post-transplant/high dose IS   - plan is for US guided steroid  "injection, timing TBD  - prn apap and oxycodone 5-10 mg q4hr prn  - gabapentin 300, 300, 600 mg at HS    #Hx staph hominis bacteremia  Cleared, no concerns for active infection. Off abx since fall 2020 without symptoms of recurrence.   - no need for surveillance blood cx per transplant ID    #Recurrent bronchitis  #Rhinovirus  Afebrile, normal WBC. Started on cefdinir 2/5 plan was for 10 d course (he has does this course frequently in the past). COVID negative 2/8. Worsening rhinorrhea and congestion 2/12. Resp PCR +rhinovirus. CXR 2/12 with atelectasis, no over PNA. Stopped cefdinir 2/15.   - symptomatic treatment with muccinex, saline rinses, nebs prn, IS  - monitor CBC  - ID recommends ideally defer transplant until 2/19  - repeat RVP ~3/5 per transplant ID    #WALTER on CKD, improved  Admission Cr. 2 <- 2.22. Improved with decreased Bumex as outpatient. Cr stable 1.6-1.7  - daily BMP     #H/o atrial fibrillation on warfarin  #H/o VT/VF on amiodarone  Currently in NSR.   - continue warfarin as above  - continue amiodarone 200 mg daily (decreased per Dr Knott note from 2/5 given transplant listing)     Chronic conditions  #DM type II: glargine stopped per patient request, medium dose insulin sliding scale  #COPD/Asthma: pta Breo Ellipta, albuterol prn  #Depression: pta Bupropion     Diet: 2g Na 2L FR  DVT Prophylaxis: warfarin  Code Status: full code  Lines: DAWSON Wilder, JOSE, NP-C  Advanced Heart Failure/Cardiology II Service  Pager [phone] [facility] [address]    ================================================================    Subjective/24-Hr Events:   Last 24 hr care team notes reviewed. Slept well. Pain is controlled when \"stays of top of meds\". No new symptoms. Denies orthopnea, PND, shortness of breath. URI symptoms improved. No lightheadedness.     ROS:  4 point ROS including respiratory, CV, GI and  (other than that noted in the HPI) is negative.     Medications: Reviewed in EPIC.     Physical " "Exam:   BP (!) 88/51 (BP Location: Right arm)   Pulse 70   Temp 97.7  F (36.5  C) (Oral)   Resp 18   Ht 1.727 m (5' 8\")   Wt 100.2 kg (220 lb 14.4 oz)   SpO2 95%   BMI 33.59 kg/m      GENERAL: Appears comfortable, in no distress.  HEENT: Eye symmetrical, no discharge or icterus bilaterally. Mucous membranes moist and without lesions.  NECK: Supple, JVD not visible at 90 degrees.   CV: +mechanical LVAD hum.  RESPIRATORY: Respirations regular, even, and unlabored. Lungs CTA throughout.    GI: Soft and non distended with normoactive bowel sounds present in all quadrants. No tenderness, rebound, guarding.   EXTREMITIES: No peripheral edema.  NEUROLOGIC: Alert and oriented x 3. No focal deficits.   MUSCULOSKELETAL: No joint swelling or tenderness.   SKIN: No jaundice. No rashes or lesions.     Labs:  CMP  Recent Labs   Lab 02/15/21  0553 02/14/21  0604 02/13/21  0608 02/12/21  0525 02/10/21  0539 02/10/21  0539 02/08/21  1659 02/08/21  1659    135 132* 134   < > 137   < > 133   POTASSIUM 4.0 4.5 4.2 4.1   < > 3.6   < > 3.8   CHLORIDE 99 99 98 101   < > 103   < > 100   CO2 28 30 28 28   < > 29   < > 28   ANIONGAP 7 6 6 6   < > 5   < > 5   * 106* 94 103*   < > 88   < > 188*   BUN 39* 37* 37* 37*   < > 39*   < > 37*   CR 1.65* 1.72* 1.75* 1.71*   < > 1.67*   < > 1.65*   GFRESTIMATED 43* 41* 40* 41*   < > 43*   < > 43*   GFRESTBLACK 50* 48* 47* 48*   < > 49*   < > 50*   QAMAR 9.0 9.1 8.8 8.8   < > 8.5   < > 8.5   MAG 2.4* 2.4* 2.4* 2.7*   < > 2.5*  --   --    PROTTOTAL  --   --  6.5*  --   --  6.6*  --  6.9   ALBUMIN  --   --  3.2*  --   --  3.3*  --  3.7   BILITOTAL  --   --  0.6  --   --  0.6  --  0.5   ALKPHOS  --   --  97  --   --  108  --  116   AST  --   --  28  --   --  38  --  38   ALT  --   --  40  --   --  47  --  47    < > = values in this interval not displayed.     INR  Recent Labs   Lab 02/15/21  0553 02/14/21  0604 02/13/21  0608 02/12/21  0525   INR 2.27* 2.26* 2.34* 2.21* "       Time/Communication  I personally spent a total of 25 minutes. Of that 15 minutes was counseling/coordination of patient's care. Plan of care discussed with patient. See my note above for details.    Patient discussed with Dr. Seth.

## 2021-02-16 ENCOUNTER — APPOINTMENT (OUTPATIENT)
Dept: CT IMAGING | Facility: CLINIC | Age: 66
DRG: 001 | End: 2021-02-16
Attending: STUDENT IN AN ORGANIZED HEALTH CARE EDUCATION/TRAINING PROGRAM
Payer: COMMERCIAL

## 2021-02-16 ENCOUNTER — APPOINTMENT (OUTPATIENT)
Dept: OCCUPATIONAL THERAPY | Facility: CLINIC | Age: 66
DRG: 001 | End: 2021-02-16
Attending: INTERNAL MEDICINE
Payer: COMMERCIAL

## 2021-02-16 DIAGNOSIS — G56.03 BILATERAL CARPAL TUNNEL SYNDROME: Primary | ICD-10-CM

## 2021-02-16 LAB
ALBUMIN SERPL-MCNC: 3.2 G/DL (ref 3.4–5)
ALBUMIN SERPL-MCNC: 3.3 G/DL (ref 3.4–5)
ALP SERPL-CCNC: 104 U/L (ref 40–150)
ALP SERPL-CCNC: 99 U/L (ref 40–150)
ALT SERPL W P-5'-P-CCNC: 39 U/L (ref 0–70)
ALT SERPL W P-5'-P-CCNC: 39 U/L (ref 0–70)
ANION GAP SERPL CALCULATED.3IONS-SCNC: 3 MMOL/L (ref 3–14)
AST SERPL W P-5'-P-CCNC: 34 U/L (ref 0–45)
AST SERPL W P-5'-P-CCNC: 38 U/L (ref 0–45)
BILIRUB DIRECT SERPL-MCNC: 0.2 MG/DL (ref 0–0.2)
BILIRUB DIRECT SERPL-MCNC: 0.2 MG/DL (ref 0–0.2)
BILIRUB SERPL-MCNC: 0.6 MG/DL (ref 0.2–1.3)
BILIRUB SERPL-MCNC: 0.7 MG/DL (ref 0.2–1.3)
BUN SERPL-MCNC: 36 MG/DL (ref 7–30)
CALCIUM SERPL-MCNC: 8.8 MG/DL (ref 8.5–10.1)
CHLORIDE SERPL-SCNC: 97 MMOL/L (ref 94–109)
CO2 SERPL-SCNC: 31 MMOL/L (ref 20–32)
CREAT SERPL-MCNC: 1.63 MG/DL (ref 0.66–1.25)
GFR SERPL CREATININE-BSD FRML MDRD: 44 ML/MIN/{1.73_M2}
GLUCOSE BLDC GLUCOMTR-MCNC: 111 MG/DL (ref 70–99)
GLUCOSE BLDC GLUCOMTR-MCNC: 128 MG/DL (ref 70–99)
GLUCOSE BLDC GLUCOMTR-MCNC: 130 MG/DL (ref 70–99)
GLUCOSE BLDC GLUCOMTR-MCNC: 194 MG/DL (ref 70–99)
GLUCOSE BLDC GLUCOMTR-MCNC: 251 MG/DL (ref 70–99)
GLUCOSE SERPL-MCNC: 116 MG/DL (ref 70–99)
INR PPP: 2.5 (ref 0.86–1.14)
LDH SERPL L TO P-CCNC: 303 U/L (ref 85–227)
MAGNESIUM SERPL-MCNC: 2.4 MG/DL (ref 1.6–2.3)
POTASSIUM SERPL-SCNC: 4.2 MMOL/L (ref 3.4–5.3)
PROT SERPL-MCNC: 6.6 G/DL (ref 6.8–8.8)
PROT SERPL-MCNC: 6.8 G/DL (ref 6.8–8.8)
SODIUM SERPL-SCNC: 132 MMOL/L (ref 133–144)

## 2021-02-16 PROCEDURE — 250N000013 HC RX MED GY IP 250 OP 250 PS 637: Performed by: STUDENT IN AN ORGANIZED HEALTH CARE EDUCATION/TRAINING PROGRAM

## 2021-02-16 PROCEDURE — 214N000001 HC R&B CCU UMMC

## 2021-02-16 PROCEDURE — 99233 SBSQ HOSP IP/OBS HIGH 50: CPT | Mod: 25 | Performed by: NURSE PRACTITIONER

## 2021-02-16 PROCEDURE — 99222 1ST HOSP IP/OBS MODERATE 55: CPT | Mod: GC | Performed by: PSYCHIATRY & NEUROLOGY

## 2021-02-16 PROCEDURE — 250N000013 HC RX MED GY IP 250 OP 250 PS 637: Performed by: INTERNAL MEDICINE

## 2021-02-16 PROCEDURE — 80048 BASIC METABOLIC PNL TOTAL CA: CPT | Performed by: STUDENT IN AN ORGANIZED HEALTH CARE EDUCATION/TRAINING PROGRAM

## 2021-02-16 PROCEDURE — 999N000128 HC STATISTIC PERIPHERAL IV START W/O US GUIDANCE

## 2021-02-16 PROCEDURE — 85610 PROTHROMBIN TIME: CPT | Performed by: STUDENT IN AN ORGANIZED HEALTH CARE EDUCATION/TRAINING PROGRAM

## 2021-02-16 PROCEDURE — 36415 COLL VENOUS BLD VENIPUNCTURE: CPT | Performed by: STUDENT IN AN ORGANIZED HEALTH CARE EDUCATION/TRAINING PROGRAM

## 2021-02-16 PROCEDURE — 70450 CT HEAD/BRAIN W/O DYE: CPT

## 2021-02-16 PROCEDURE — 999N001017 HC STATISTIC GLUCOSE BY METER IP

## 2021-02-16 PROCEDURE — 93750 INTERROGATION VAD IN PERSON: CPT | Performed by: NURSE PRACTITIONER

## 2021-02-16 PROCEDURE — 250N000013 HC RX MED GY IP 250 OP 250 PS 637: Performed by: NURSE PRACTITIONER

## 2021-02-16 PROCEDURE — 80076 HEPATIC FUNCTION PANEL: CPT | Performed by: STUDENT IN AN ORGANIZED HEALTH CARE EDUCATION/TRAINING PROGRAM

## 2021-02-16 PROCEDURE — 97110 THERAPEUTIC EXERCISES: CPT | Mod: GO

## 2021-02-16 PROCEDURE — 83735 ASSAY OF MAGNESIUM: CPT | Performed by: STUDENT IN AN ORGANIZED HEALTH CARE EDUCATION/TRAINING PROGRAM

## 2021-02-16 PROCEDURE — 70450 CT HEAD/BRAIN W/O DYE: CPT | Mod: 26 | Performed by: RADIOLOGY

## 2021-02-16 PROCEDURE — 83615 LACTATE (LD) (LDH) ENZYME: CPT | Performed by: STUDENT IN AN ORGANIZED HEALTH CARE EDUCATION/TRAINING PROGRAM

## 2021-02-16 RX ORDER — COLCHICINE 0.6 MG/1
0.6 TABLET ORAL ONCE
Status: COMPLETED | OUTPATIENT
Start: 2021-02-16 | End: 2021-02-16

## 2021-02-16 RX ORDER — WARFARIN SODIUM 7.5 MG/1
7.5 TABLET ORAL
Status: DISCONTINUED | OUTPATIENT
Start: 2021-02-16 | End: 2021-02-16

## 2021-02-16 RX ORDER — AMOXICILLIN 250 MG
2 CAPSULE ORAL 2 TIMES DAILY PRN
Status: DISCONTINUED | OUTPATIENT
Start: 2021-02-16 | End: 2021-05-31 | Stop reason: HOSPADM

## 2021-02-16 RX ORDER — COLCHICINE 0.6 MG/1
0.6 TABLET ORAL ONCE
Status: DISCONTINUED | OUTPATIENT
Start: 2021-02-16 | End: 2021-02-16

## 2021-02-16 RX ORDER — WARFARIN SODIUM 5 MG/1
5 TABLET ORAL
Status: COMPLETED | OUTPATIENT
Start: 2021-02-16 | End: 2021-02-16

## 2021-02-16 RX ORDER — POTASSIUM CHLORIDE 750 MG/1
60 TABLET, EXTENDED RELEASE ORAL DAILY
Status: DISCONTINUED | OUTPATIENT
Start: 2021-02-17 | End: 2021-03-18

## 2021-02-16 RX ORDER — COLCHICINE 0.6 MG/1
1.2 TABLET ORAL ONCE
Status: DISCONTINUED | OUTPATIENT
Start: 2021-02-16 | End: 2021-02-16

## 2021-02-16 RX ADMIN — Medication 10 MG: at 22:26

## 2021-02-16 RX ADMIN — WARFARIN SODIUM 5 MG: 5 TABLET ORAL at 17:54

## 2021-02-16 RX ADMIN — OXYCODONE HYDROCHLORIDE 10 MG: 5 TABLET ORAL at 10:48

## 2021-02-16 RX ADMIN — AMIODARONE HYDROCHLORIDE 200 MG: 200 TABLET ORAL at 08:10

## 2021-02-16 RX ADMIN — POTASSIUM CHLORIDE 60 MEQ: 1500 TABLET, EXTENDED RELEASE ORAL at 08:10

## 2021-02-16 RX ADMIN — BUMETANIDE 4 MG: 2 TABLET ORAL at 08:09

## 2021-02-16 RX ADMIN — DOCUSATE SODIUM 50 MG AND SENNOSIDES 8.6 MG 1 TABLET: 8.6; 5 TABLET, FILM COATED ORAL at 19:20

## 2021-02-16 RX ADMIN — GABAPENTIN 600 MG: 300 CAPSULE ORAL at 22:26

## 2021-02-16 RX ADMIN — ALLOPURINOL 300 MG: 300 TABLET ORAL at 08:10

## 2021-02-16 RX ADMIN — ISOSORBIDE DINITRATE 10 MG: 10 TABLET ORAL at 14:34

## 2021-02-16 RX ADMIN — BUMETANIDE 5 MG: 1 TABLET ORAL at 16:28

## 2021-02-16 RX ADMIN — GUAIFENESIN 600 MG: 600 TABLET ORAL at 19:26

## 2021-02-16 RX ADMIN — HYDRALAZINE HYDROCHLORIDE 75 MG: 25 TABLET, FILM COATED ORAL at 08:10

## 2021-02-16 RX ADMIN — ACETAMINOPHEN 650 MG: 325 TABLET, FILM COATED ORAL at 10:43

## 2021-02-16 RX ADMIN — POLYETHYLENE GLYCOL 3350 17 G: 17 POWDER, FOR SOLUTION ORAL at 08:12

## 2021-02-16 RX ADMIN — HYDRALAZINE HYDROCHLORIDE 75 MG: 25 TABLET, FILM COATED ORAL at 19:20

## 2021-02-16 RX ADMIN — FLUTICASONE FUROATE AND VILANTEROL TRIFENATATE 1 PUFF: 100; 25 POWDER RESPIRATORY (INHALATION) at 08:11

## 2021-02-16 RX ADMIN — ISOSORBIDE DINITRATE 10 MG: 10 TABLET ORAL at 19:20

## 2021-02-16 RX ADMIN — COLCHICINE 0.6 MG: 0.6 TABLET ORAL at 16:28

## 2021-02-16 RX ADMIN — UMECLIDINIUM 1 PUFF: 62.5 AEROSOL, POWDER ORAL at 08:11

## 2021-02-16 RX ADMIN — BUPROPION HYDROCHLORIDE 150 MG: 150 TABLET, EXTENDED RELEASE ORAL at 08:09

## 2021-02-16 RX ADMIN — ALBUTEROL SULFATE 2 PUFF: 90 AEROSOL, METERED RESPIRATORY (INHALATION) at 08:57

## 2021-02-16 RX ADMIN — MONTELUKAST 10 MG: 10 TABLET, FILM COATED ORAL at 22:26

## 2021-02-16 RX ADMIN — GABAPENTIN 300 MG: 300 CAPSULE ORAL at 14:34

## 2021-02-16 RX ADMIN — GABAPENTIN 300 MG: 300 CAPSULE ORAL at 08:09

## 2021-02-16 RX ADMIN — ASPIRIN 81 MG CHEWABLE TABLET 81 MG: 81 TABLET CHEWABLE at 08:09

## 2021-02-16 RX ADMIN — OXYCODONE HYDROCHLORIDE 10 MG: 5 TABLET ORAL at 05:53

## 2021-02-16 RX ADMIN — ISOSORBIDE DINITRATE 10 MG: 10 TABLET ORAL at 08:10

## 2021-02-16 RX ADMIN — HYDRALAZINE HYDROCHLORIDE 75 MG: 25 TABLET, FILM COATED ORAL at 14:34

## 2021-02-16 RX ADMIN — GUAIFENESIN 600 MG: 600 TABLET ORAL at 08:09

## 2021-02-16 ASSESSMENT — ACTIVITIES OF DAILY LIVING (ADL)
ADLS_ACUITY_SCORE: 12

## 2021-02-16 ASSESSMENT — VISUAL ACUITY: OU: NORMAL ACUITY

## 2021-02-16 ASSESSMENT — MIFFLIN-ST. JEOR: SCORE: 1771.95

## 2021-02-16 NOTE — PROGRESS NOTES
[facility]   Cardiology II Service / Advanced Heart Failure  Device Interrogation Note  Date of Service: 2/16/2021    The patient's HeartMate LVAD was interrogated 2/16/2021  * Speed 9600 rpm   * Pulsatility index 4.1   * Power 6.9 Manuel   * Flow 6.7 L/minute   Fluid status: Hypervolemic   Alarms were reviewed, and notable for frequent PI events.   The driveline exit site was covered with dressing clean, dry, and intact.   All external components were inspected and showed no evidence of damage or malfunction.    Soraya Marshall, TIM CNP  2/16/2021

## 2021-02-16 NOTE — PROGRESS NOTES
[facility]   Cardiology II Service / Advanced Heart Failure  Daily Progress Note  Date of Service: 2/16/2021      Patient: Jim Willingham  MRN: [id number]  Admission Date: 2/8/2021  Hospital Day # 8    Assessment and Plan: Jim Willingham is a 65 year old male with history of NICM EF 20% c/b VT s/p CRT-D s/p HMII LVAD 6/19/2017 originally intended as destination therapy 2/2 obesity however with recent weight loss now candidate for transplantation. He is admitted for worsening functional status and renal function concerning for worsening heart failure. He is now listed status 2E for transplant, extension due 2/22/21.     Chronic systolic heart failure secondary to NICM s/p HM II LVAD. Echo 1/8/21 at 9400rpm, EF<30%m LVIDd 6.8cm, at least mildly reduced RV function, AoV closed without AI, mild-mod eccentric MR, dilated IVC without collapse.   Stage D, NYHA Class IIIB  ACEi/ARB contraindicated due to renal dysfunction. Hydralazine 75 mg po TID. Isordil 10 mg po TID.   BB contraindicated due to low cardiac output  Aldosterone antagonist contraindicated due to renal dysfunction  SCD prophylaxis CRT-D  Fluid status hypervolemic. Bumex 4 mg po BID.   MAP: 86  LDH: 303   Anticoagulation: Coumadin per pharmacy. INR- 2.5, goal 2-3.   Antiplatelet: ASA 81 mg po daily  - remains on the cardiac transplant list status 2E.     Right sided weakness.   - CT head negative for acute findings.   - Appreciate Neuro eval.   - OT consult.     Carpel tunnel, bilateral. Evidence of thenar atrophy. Relief with steroid injection 11/20.   - Appreciate Ortho consult.   - Consider surgical intervention vs repeat steroid injection.     WALTER on CKD Stage III. Cr peaked at 2.22  - Cr-1.63    History of Afib. History of VT/VF. CHADSVASC-5.   - Continue Amiodarone 200 mg po daily.   - Coumadin as above.     Rhinovirus. Bacterial bronchitis. Completed 5 day course of Cefdinir. COVID negative 2/8. Resp PCR +rhinovirus. CXR 2/12 with no  "overt PNA. Stopped cefdinir 2/15.   - ID recommends ideally defer transplant until 2/19.   - repeat RVP-3/5 per transplant ID    Staph Hominis Bacteremia.   - no need for surveillance blood cx per transplant ID.     Chronic conditions  DM type II, controlled: Hgb A1C-6.2. Glargine stopped per patient request on 2/14/21, medium dose Novolog sliding scale insulin.  COPD/Asthma: pta Breo Ellipta, albuterol prn  Depression: pta Bupropion    FEN: 2 gram sodium diet   PROPHY:  Coumadin  LINES:  PIV   DISPO:  TBD pending cardiac transplant.   CODE STATUS: Full Code   ================================================================  Interval History/ROS: He complains of right sided weakness this AM, which has been present since 4 AM. He complains of bilateral upper extremity pain and right foot pain due to gout flare. He denies fever, chills, chest pain, palpitations, cough, nausea, vomiting, diarrhea, melena, hematochezia, and LE edema. He denies any concerns at his drive line site. He is tolerating oral intake and ambulation.     Last 24 hr care team notes reviewed.   ROS:  4 point ROS including Respiratory, CV, GI and , other than that noted in the HPI, is negative.     Medications: Reviewed in EPIC.     Physical Exam:   BP (!) 79/57 (BP Location: Left arm)   Pulse 63   Temp 98.3  F (36.8  C) (Oral)   Resp 18   Ht 1.727 m (5' 8\")   Wt 100.2 kg (221 lb)   SpO2 95%   BMI 33.60 kg/m    GENERAL: Appears alert and oriented times three.   HEENT: Eye symmetrical and free of discharge bilaterally. Mucous membranes moist and without lesions.  NECK: Supple and without lymphadenopathy. JVD to jaw  CV: RRR, S1S2 present with LVAD hum  RESPIRATORY: Respirations regular, even, and unlabored. Lungs CTA throughout.   GI: Soft and non distended with normoactive bowel sounds present in all quadrants. No tenderness, rebound, guarding. No organomegaly.   EXTREMITIES: +1 bilateral LE peripheral edema. 2+ bilateral pedal pulses. "   NEUROLOGIC: Alert and orientated x 3. CN II-XII grossly intact. No pronator drift or cerebellar ataxia. Drifting to right side.   MUSCULOSKELETAL: No joint swelling or tenderness.   SKIN: No jaundice. No rashes or lesions. LVAD drive line covered.     Data:  CMP  Recent Labs   Lab 02/16/21  0532 02/15/21  0553 02/14/21  0604 02/13/21  0608 02/10/21  0539 02/10/21  0539   * 134 135 132*   < > 137   POTASSIUM 4.2 4.0 4.5 4.2   < > 3.6   CHLORIDE 97 99 99 98   < > 103   CO2 31 28 30 28   < > 29   ANIONGAP 3 7 6 6   < > 5   * 102* 106* 94   < > 88   BUN 36* 39* 37* 37*   < > 39*   CR 1.63* 1.65* 1.72* 1.75*   < > 1.67*   GFRESTIMATED 44* 43* 41* 40*   < > 43*   GFRESTBLACK 51* 50* 48* 47*   < > 49*   QAMAR 8.8 9.0 9.1 8.8   < > 8.5   MAG 2.4* 2.4* 2.4* 2.4*   < > 2.5*   PROTTOTAL 6.6*  --   --  6.5*  --  6.6*   ALBUMIN 3.2*  --   --  3.2*  --  3.3*   BILITOTAL 0.6  --   --  0.6  --  0.6   ALKPHOS 99  --   --  97  --  108   AST 34  --   --  28  --  38   ALT 39  --   --  40  --  47    < > = values in this interval not displayed.     CBC  Recent Labs   Lab 02/15/21  0553 02/14/21  0604 02/13/21  0608 02/12/21  0925   WBC 5.9 8.7 12.1* 5.5   RBC 3.86* 4.07* 3.71* 4.02*   HGB 10.7* 10.8* 9.9* 10.9*   HCT 35.0* 36.8* 32.7* 35.8*   MCV 91 90 88 89   MCH 27.7 26.5 26.7 27.1   MCHC 30.6* 29.3* 30.3* 30.4*   RDW 19.4* 19.5* 19.2* 19.2*    236 234 248     INR  Recent Labs   Lab 02/16/21  0532 02/15/21  0553 02/14/21  0604 02/13/21  0608   INR 2.50* 2.27* 2.26* 2.34*       Patient discussed with Dr. Seth.      Soraya Marshall [facility]  2/16/2021

## 2021-02-16 NOTE — PLAN OF CARE
D/A/I:  Patient A&O x4, denied palpitations, difficulty breathing, SOB, dizziness, and nausea.  Lung sounds clear to auscultation, LVAD hum noted.  Had 1-2+ edema in legs and feet, was given scheduled bumetanide.  Patient had significant urine output, see I&O flowsheet.  Reported no BM since 2/13, bowel sounds normoactive, reported passing gas.  Gave scheduled laxative, no BM during shift.  In sinus rhythm with first degree AV block, BBB, and rare PVCs, HR 70s-80s, SBP 80s-90s, SaO2 95-96% on room air.  HeartMate II LVAD running at a fixed rate of 9600 rpm, no alarms during shift.  Flow 6.2-7.3, PI 3.3-4.6.  Drive line dressing changed during shift, see PCS for assessment.  Ambulated in room independently with steady gait.    P:  Continue to monitor pain, VS, heart rhythm, LVAD function, drive line site integrity, fluid status, bowel status, cardiac and respiratory status.  Notify care team of changes in patient condition or other concerns.  Awaiting heart transplant, status 2E, allowed to wait as inpatient until 2/22.

## 2021-02-16 NOTE — PROGRESS NOTES
Arrival to Stroke Code: 825    Stroke Team escalate/de-escalate interventions: Head CT, no CTa, de-escalated 845.    ED/Bedside Nurse providing handoff: Estela Boswell RN    Time left for CT: 836    Time Returned to ED/Unit: 844    ED/Bedside Nurse given handoff to & Time: Estela Boswell RN @ 845.

## 2021-02-16 NOTE — PLAN OF CARE
D: Admitted with decompensated HF-c/o RESENDIZ/fatigue  PMH: NICM EF 20% c/b VT s/p CRT-D s/p HMII LVAD 6/19/2017 , Afib-s/p DCCV, CKD3,COPD  He is now listed status 2E for transplant, extension due 2/22/21.    I: Monitored vitals and assessed pt status.   Running: SL'd  PRN: Miralax    A: A0x4. VSS, on RA. Monitor SR 70-90s w/1st degree AVB/BBB, 0-6 PVC, 0-3 PAC, 6bts aber AFib.Last BM 2/13. Good UO. LVAD #s WNL-no issues or alarms overnight.    I/O this shift:  In: -   Out: 375 [Urine:375]    Temp:  [97.7  F (36.5  C)-98.6  F (37  C)] 98.6  F (37  C)  Pulse:  [70-87] 77  Resp:  [18-20] 18  BP: (87-96)/(51-81) 93/56  Cuff Mean (mmHg):  [84] 84  SpO2:  [95 %-97 %] 97 %      P: Continue to monitor Pt status and report changes to treatment team. Status 2E for heart transplant. US guided steroid injection for carpal tunnel - ? date

## 2021-02-16 NOTE — CONSULTS
[facility]    Stroke Consult Note    Reason for Consult: Stroke Code    Chief Complaint: Feeling off balance     HPI  Jim Willingham is a 65 year old man with history of nonischemic cardiomyopathy with LVEF 20%, LVAD, obesity, CKD, atrial fibrillation on warfarin, history of V. tach on amiodarone, diabetes, COPD who presents for evaluation as inpatient stroke code.  Patient reports the last known well of last night before he went to bed.  This morning, around 4 AM, he woke up and noticed that he while he was sitting up, he had a tendency to lean to the right.  He denies dizziness, shortness of breath, lightheadedness, tingling, numbness, weakness, difficulty walking or standing.  He denies any visual disturbances or headache.  Stroke code was called at 8 AM when the nurse noticed the aforementioned chief complaint.  Patient denies history of stroke.  His INR today is 2.5.    I personally reviewed CTH. It was negative for acute intracranial pathology. CTA deferred due to low GFR (44).     TPA Treatment   Not given due to minor/isolated/quickly resolving symptoms., TPA     Endovascular Treatment  Not initiated due to no CTA/already therapeutic on warfarin.     Impression  Stroke code was called today for symptom concerning for axial proprioception disruption (patient feels like he wants to veer to the right). Overall, I have low suspicion for LVO given patient is therapeutic on his warfarin. His neurological exam today, including testing of Romberg sign, cerebellar signs, and proprioception are normal. He has horizontal movements of both eyes while testing skew deviation. Significance of this is unclear, but could represent central stroke. Rest of HINTS test was reassuring. We would have ideally recommended an MRI but he cannot get this due to LVAD.     Recommendations  - Blood pressure management per primary team   - Repeat CT Head without contrast in 24 hours (9AM on  2/17) if symptom of wanting to veer to the right when sitting up still persists  - Orthostatic vitals       Thank you for this consult. We will continue to follow.     The patient was discussed with Stroke Fellow, Dr. Engel.  The Stroke Staff is Dr. Mckeon.    Jose Antonio Akbar MD  Neurology Resident  ______________________________________________________    Past Medical History   Past Medical History:   Diagnosis Date     Benign essential hypertension 5/11/2017     Bilateral carpal tunnel syndrome 11/2/2020     Cardiomyopathy, unspecified (H) 5/8/2017     CKD (chronic kidney disease) stage 3, GFR 30-59 ml/min 5/11/2017     COPD (chronic obstructive pulmonary disease) (H) 11/2/2020     Depression 5/11/2017     Diabetes mellitus (H) 5/11/2017     Gouty arthropathy, chronic, without tophi 11/2/2020     H/O gastric bypass 5/11/2017     ICD (implantable cardioverter-defibrillator), biventricular, in situ 5/11/2017     LVAD (left ventricular assist device) present (H)      Major depression, recurrent, chronic (H) 11/2/2020     NICM (nonischemic cardiomyopathy) (H)/ EF 20% 5/11/2017    ECHO: LVEDd. 7.66 cm, Restrictive pattern , Severe mitral valve regurgitation     CECILIA (obstructive sleep apnea) 5/11/2017     Paroxysmal atrial fibrillation (H) 5/11/2017     Paroxysmal VT (H) 5/11/2017     Rotator cuff tear arthropathy of both shoulders 11/2/2020     Uncomplicated asthma      Vitamin B12 deficiency (non anemic) 5/11/2017     Past Surgical History   Past Surgical History:   Procedure Laterality Date     ANESTHESIA CARDIOVERSION N/A 5/11/2020    Procedure: ANESTHESIA, FOR CARDIOVERSION @1100;  Surgeon: GENERIC ANESTHESIA PROVIDER;  Location: [facility] OR     CV RIGHT HEART CATH MEASUREMENTS RECORDED N/A 7/24/2019    Procedure: CV RIGHT HEART CATH;  Surgeon: Renu Sears MD;  Location: [facility] HEART CARDIAC CATH LAB     CV RIGHT HEART CATH MEASUREMENTS RECORDED N/A 8/5/2020    Procedure: CV RIGHT HEART CATH;  Surgeon: Rolo  MD Nicola;  Location:  HEART CARDIAC CATH LAB     CV RIGHT HEART CATH MEASUREMENTS RECORDED N/A 1/7/2021    Procedure: CV RIGHT HEART CATH;  Surgeon: Jac Dover MD;  Location:  HEART CARDIAC CATH LAB     GI SURGERY  2003    [facility]     INSERT VENTRICULAR ASSIST DEVICE LEFT (HEARTMATE II) N/A 6/19/2017    Procedure: INSERT VENTRICULAR ASSIST DEVICE LEFT (HEARTMATE II);  Median Sternotomy Heartmate II Left Ventricular Assist Device Insertion on Pump Oxygenator;  Surgeon: Ronnie Quigley MD;  Location:  OR     ORTHOPEDIC SURGERY  1994    right knee wired     PICC DOUBLE LUMEN PLACEMENT Right 09/23/2020    5FR PICC DL. Length-43cm (1cm out).     Medications   Home Meds  Prior to Admission medications    Medication Sig Start Date End Date Taking? Authorizing Provider   acetaminophen (TYLENOL 8 HOUR ARTHRITIS PAIN) 650 MG CR tablet Take 650 mg by mouth every 6 hours as needed for pain   Yes Unknown, Entered By History   albuterol (PROAIR HFA) 108 (90 Base) MCG/ACT inhaler Inhale 2 puffs into the lungs every 4 hours as needed for shortness of breath / dyspnea or wheezing    Yes Reported, Patient   allopurinol (ZYLOPRIM) 300 MG tablet Take 1 tablet (300 mg) by mouth daily 10/13/20 4/11/21 Yes Elbert Pettit MD   amiodarone (PACERONE) 400 MG tablet Take 400 mg by mouth daily    Yes Unknown, Entered By History   aspirin 81 MG chewable tablet 1 tablet (81 mg) by Oral or Feeding Tube route daily 7/6/17  Yes Madina Laguna PA   bumetanide (BUMEX) 2 MG tablet Take 2 tablets (4 mg) by mouth 2 times daily 2/5/21  Yes Delisa Montgomery MD   buPROPion (WELLBUTRIN XL) 150 MG 24 hr tablet Take 1 tablet (150 mg) by mouth every morning 11/30/20  Yes Ricardo Gómez MD   cefdinir (OMNICEF) 300 MG capsule Take 1 capsule (300 mg) by mouth 2 times daily 2/5/21  Yes Delisa Montgomery MD   Fluticasone-Umeclidin-Vilanterol (TRELEGY ELLIPTA) 100-62.5-25 MCG/INH oral inhaler Inhale 1 puff into the lungs  daily 1/26/21  Yes Ricardo Gómez MD   gabapentin (NEURONTIN) 300 MG capsule Take 300 mg by mouth 3 times daily    Yes Unknown, Entered By History   hydrALAZINE (APRESOLINE) 25 MG tablet Take 3 tablets (75 mg) by mouth 3 times daily 1/11/21  Yes Xiomara Carrera MD   insulin glargine (LANTUS SOLOSTAR) 100 UNIT/ML pen Inject 6 Units Subcutaneous At Bedtime 9/9/20  Yes Jacque Mims MD   insulin lispro (HUMALOG KWIKPEN) 100 UNIT/ML (1 unit dial) KWIKPEN Inject 1-7 Units Subcutaneous 4 times daily 8/7/20  Yes Soraya Marshall APRN CNP   isosorbide dinitrate (ISORDIL) 10 MG tablet Take 1 tablet (10 mg) by mouth 3 times daily 1/11/21  Yes Xiomara Carrera MD   montelukast (SINGULAIR) 10 MG tablet Take 10 mg by mouth At Bedtime    Yes Reported, Patient   potassium chloride ER (KLOR-CON M) 20 MEQ CR tablet Take 3 tablets (60 mEq) by mouth daily 1/11/21  Yes Xiomara Carrera MD   warfarin ANTICOAGULANT (COUMADIN) 5 MG tablet Take 7.5 mg (5 mg x 1.5) by mouth every Monday, Wednesday, Friday and 10 mg (5 mg x 2) all other days of the week, or as directed by INR clinic.   Yes Unknown, Entered By History   zinc sulfate (ZINCATE) 220 (50 Zn) MG capsule Take 220 mg by mouth 2 times daily   Yes Unknown, Entered By History   ACE/ARB/ARNI NOT PRESCRIBED (INTENTIONAL) ACE & ARB not prescribed due to Other:    Rose Conte MD   blood glucose monitoring (ONE TOUCH ULTRA 2) meter device kit Use to test blood sugar four times daily or as directed. 1/24/20   Soraya Marshall APRN CNP   blood glucose VI test strip Use to test blood sugar four times daily or as directed. 2/20/20   Soraya Marshall APRN CNP   cyanocobalamin (VITAMIN B12) 1000 MCG/ML injection Inject 1,000 mcg into the muscle every 30 days    Reported, Patient   insulin pen needle (32G X 4 MM) 32G X 4 MM miscellaneous Use four pen needles daily or as directed. 1/24/20   Soraya Marshall APRN CNP   metolazone (ZAROXOLYN) 2.5 MG  tablet Take 1 tablet (2.5 mg) by mouth as needed (take ONLY as directed by VAD team) 10/22/20   Delisa Montgomery MD       Scheduled Meds    allopurinol  300 mg Oral Daily     amiodarone  200 mg Oral Daily     aspirin  81 mg Oral or Feeding Tube Daily     bumetanide  4 mg Oral Daily     bumetanide  4 mg Oral Daily     buPROPion  150 mg Oral QAM     colchicine  0.6 mg Oral Once     fluticasone-vilanterol  1 puff Inhalation Daily     gabapentin  300 mg Oral BID     gabapentin  600 mg Oral At Bedtime     guaiFENesin  600 mg Oral BID     hydrALAZINE  75 mg Oral TID     insulin aspart  1-3 Units Subcutaneous TID AC     insulin aspart  1-3 Units Subcutaneous At Bedtime     isosorbide dinitrate  10 mg Oral TID     montelukast  10 mg Oral At Bedtime     polyethylene glycol  17 g Oral Daily     [START ON 2/17/2021] potassium chloride  60 mEq Oral Daily     senna-docusate  1 tablet Oral At Bedtime     umeclidinium  1 puff Inhalation Daily     warfarin ANTICOAGULANT  5 mg Oral ONCE at 18:00       Infusion Meds    - MEDICATION INSTRUCTIONS -       - MEDICATION INSTRUCTIONS -       ACE/ARB/ARNI NOT PRESCRIBED       Warfarin Therapy Reminder         PRN Meds  acetaminophen, acetaminophen, albuterol, alum & mag hydroxide-simethicone, glucose **OR** dextrose **OR** glucagon, lidocaine 4%, lidocaine (buffered or not buffered), - MEDICATION INSTRUCTIONS -, melatonin, naloxone **OR** naloxone **OR** naloxone **OR** naloxone, nitroGLYcerin, oxyCODONE, - MEDICATION INSTRUCTIONS -, polyethylene glycol, ACE/ARB/ARNI NOT PRESCRIBED, senna-docusate, sodium chloride (PF), sodium chloride (PF), Warfarin Therapy Reminder    Allergies   Allergies   Allergen Reactions     Beer Shortness Of Breath     Grass Shortness Of Breath     Ace Inhibitors Cough     Dust Mites Other (See Comments)     Asthma     Mold Other (See Comments)     Asthma     Penicillins Other (See Comments)     Unknown - childhood exposure    Tolerated Zosyn 9/18-9/20/2020      Sulfa Drugs Other (See Comments) and Unknown     Unknown childhood reaction     Family History   Family History   Problem Relation Age of Onset     Cerebrovascular Disease Mother 64     Diabetes Mother      Hypertension Mother      Coronary Artery Disease Father      Diabetes Type 2  Father      Obesity Brother      Obesity Brother      Cerebrovascular Disease Daughter 40     Social History   Social History     Tobacco Use     Smoking status: Former Smoker     Quit date:      Years since quittin.1     Smokeless tobacco: Never Used   Substance Use Topics     Alcohol use: No     Drug use: No       Review of Systems   The 10 point Review of Systems is negative other than noted in the HPI or here.        PHYSICAL EXAMINATION  Temp:  [98.2  F (36.8  C)-98.6  F (37  C)] 98.3  F (36.8  C)  Pulse:  [63-98] 98  Resp:  [16-20] 18  BP: (79-97)/(56-81) 87/75  Cuff Mean (mmHg):  [84] 84  SpO2:  [91 %-97 %] 91 %     General:  patient lying in bed without any acute distress    HEENT:  normocephalic/atraumatic  Cardio:  RRR  Pulmonary:  no respiratory distress  Abdomen:  obese  Extremities:  pitting edema present  Skin:  intact, warm/dry     Neurologic  Hints Exam: Positive test of skew (horizontal saccades), corrective saccade present with head impulse test. Left-beating nystagmus when looking to L and R.   Mental Status:  alert, oriented x 3, follows commands, speech clear and fluent.   Cranial Nerves:  visual fields intact, left-beating nystagmus when looking to either side, facial sensation intact and symmetric, facial movements symmetric, hearing not formally tested but intact to conversation, palate elevation symmetric and uvula midline, no dysarthria, tongue protrusion midline  Motor:  normal muscle tone and bulk, no abnormal movements, able to move all limbs spontaneously, strength 5/5 throughout upper and lower extremities, no pronator drift  Sensory:  light touch sensation intact and symmetric throughout  upper and lower extremities, no extinction on double simultaneous stimulation   Coordination:  normal finger-to-nose and heel-to-shin bilaterally without dysmetria, rapid alternating movements symmetric  Station/Gait:  Romberg sign normal. Ambulates independently.     Dysphagia Screen  Per Nursing    Stroke Scales    NIHSS  Interval     Interval Comments     1a. Level of Consciousness 0-->Alert, keenly responsive   1b. LOC Questions 0-->Answers both questions correctly   1c. LOC Commands 0-->Performs both tasks correctly   2.   Best Gaze 0-->Normal   3.   Visual 0-->No visual loss   4.   Facial Palsy 0-->Normal symmetrical movements   5a. Motor Arm, Left 0-->No drift, limb holds 90 (or 45) degrees for full 10 secs   5b. Motor Arm, Right 0-->No drift, limb holds 90 (or 45) degrees for full 10 secs   6a. Motor Leg, Left 0-->No drift, leg holds 30 degree position for full 5 secs   6b. Motor Leg, right 0-->No drift, leg holds 30 degree position for full 5 secs   7.   Limb Ataxia 0-->Absent   8.   Sensory 0-->Normal, no sensory loss   9.   Best Language 0-->No aphasia, normal   10. Dysarthria 0-->Normal   11. Extinction and Inattention  0-->No abnormality   Total 0 (02/16/21 1630)       Imaging  I personally reviewed all imaging; relevant findings per HPI.     Lab Results Data   CBC  Recent Labs   Lab 02/15/21  0553 02/14/21  0604 02/13/21  0608   WBC 5.9 8.7 12.1*   RBC 3.86* 4.07* 3.71*   HGB 10.7* 10.8* 9.9*   HCT 35.0* 36.8* 32.7*    236 234     Basic Metabolic Panel    Recent Labs   Lab 02/16/21  0532 02/15/21  0553 02/14/21  0604   * 134 135   POTASSIUM 4.2 4.0 4.5   CHLORIDE 97 99 99   CO2 31 28 30   BUN 36* 39* 37*   CR 1.63* 1.65* 1.72*   * 102* 106*   QAMAR 8.8 9.0 9.1     Liver Panel  Recent Labs   Lab 02/16/21  0859 02/16/21  0532 02/13/21  0608   PROTTOTAL 6.8 6.6* 6.5*   ALBUMIN 3.3* 3.2* 3.2*   BILITOTAL 0.7 0.6 0.6   ALKPHOS 104 99 97   AST 38 34 28   ALT 39 39 40     INR    Recent Labs    Lab Test 02/16/21  0532 02/15/21  0553 02/14/21  0604   INR 2.50* 2.27* 2.26*      Lipid Profile    Recent Labs   Lab Test 11/05/20  0907 07/06/18  0856 06/08/17  0944   CHOL 209* 216* 173   HDL 87 63 49   * 128* 99   TRIG 91 123 124     A1C    Recent Labs   Lab Test 01/07/21  0719 11/05/20  0907 08/05/20  0335   A1C 6.2* 5.7* 7.5*     Troponin I  No results for input(s): TROPI in the last 168 hours.       Stroke Code / Stroke Consult Data Data   Stroke Code Data  (for stroke code without tele)  Stroke code activated 02/16/21   0817   First stroke provider response     0820   Last known normal     2000   Time of discovery   (or onset of symptoms)     0400   Head CT read by me 02/16/21   0840   Was stroke code de-escalated? Yes 02/16/21 0858  patient is outside emergent treatment time parameters

## 2021-02-16 NOTE — PLAN OF CARE
Dx: Pt is admitted due to worsening functional status and renal function concerning for worsening heart failure. He is now listed status 2E for  OHT.   PMH: NICM EF 20% VT, CRT-D, HMII LVAD 6/19/2017, obesity, recent weight loss, atrial fibrillation s/p DCCV on amiodarone and warfarin, history of staph hominis bacteremia, CKD stage 3, COPD  Neuro: Alox4. Pt appeared to be tired and lethargic at the beginning of the shift, but after he took a nap he became more awake.  Neuro assesment unchanged. Pt denied any headache, visual disturbance or any new N or T.    Cardiac: SR with 1st degree AVB and BBB. MAP-80's. VAD #'s w/o any changes.   Respiratory: LS + dim as bases. On RA. Uses IS.   GI: No c/o N or V. Last BM 2 days ago. Pt denied constipation.   :Good UO while on PO Bumex BID. BMP in am per NP. Keeping a strict I and O's.   Diet/appetite: Good  Activity: Generalized weakness and unsteady gaits.  Up with SBA. Bed alarm on. Pt is calling appropriately.   Pain: pt c/o bilteral wirst discomfort R>L. A one time Colchicine was given this evening and pt reported some relife.  Skin: abrasion on Knee.   LDA's: PIV-SL. Drive line.   Plan:  Continue to monitor. Activity with assist only due to weakness and non witnessed fall this afternoon.

## 2021-02-16 NOTE — PROGRESS NOTES
C2 NP notified pt is experiencing new Right sided weakness. Pt sitting on edge of bed with inability to remain sitting upright and continuously falls to the right. Pt states this started at 4 am. Other neuro's WNL. New numbness and tingling noted. Stroke code called.

## 2021-02-17 ENCOUNTER — APPOINTMENT (OUTPATIENT)
Dept: PHYSICAL THERAPY | Facility: CLINIC | Age: 66
DRG: 001 | End: 2021-02-17
Attending: NURSE PRACTITIONER
Payer: COMMERCIAL

## 2021-02-17 ENCOUNTER — APPOINTMENT (OUTPATIENT)
Dept: OCCUPATIONAL THERAPY | Facility: CLINIC | Age: 66
DRG: 001 | End: 2021-02-17
Attending: INTERNAL MEDICINE
Payer: COMMERCIAL

## 2021-02-17 PROBLEM — G56.03 BILATERAL CARPAL TUNNEL SYNDROME: Status: ACTIVE | Noted: 2020-11-02

## 2021-02-17 LAB
ANION GAP SERPL CALCULATED.3IONS-SCNC: 5 MMOL/L (ref 3–14)
BUN SERPL-MCNC: 37 MG/DL (ref 7–30)
CALCIUM SERPL-MCNC: 8.7 MG/DL (ref 8.5–10.1)
CHLORIDE SERPL-SCNC: 101 MMOL/L (ref 94–109)
CO2 SERPL-SCNC: 30 MMOL/L (ref 20–32)
CREAT SERPL-MCNC: 1.69 MG/DL (ref 0.66–1.25)
ERYTHROCYTE [DISTWIDTH] IN BLOOD BY AUTOMATED COUNT: 19.6 % (ref 10–15)
GFR SERPL CREATININE-BSD FRML MDRD: 42 ML/MIN/{1.73_M2}
GLUCOSE BLDC GLUCOMTR-MCNC: 110 MG/DL (ref 70–99)
GLUCOSE BLDC GLUCOMTR-MCNC: 120 MG/DL (ref 70–99)
GLUCOSE BLDC GLUCOMTR-MCNC: 166 MG/DL (ref 70–99)
GLUCOSE BLDC GLUCOMTR-MCNC: 85 MG/DL (ref 70–99)
GLUCOSE BLDC GLUCOMTR-MCNC: 97 MG/DL (ref 70–99)
GLUCOSE SERPL-MCNC: 112 MG/DL (ref 70–99)
HCT VFR BLD AUTO: 31.8 % (ref 40–53)
HGB BLD-MCNC: 9.9 G/DL (ref 13.3–17.7)
INR PPP: 2.25 (ref 0.86–1.14)
MAGNESIUM SERPL-MCNC: 2.5 MG/DL (ref 1.6–2.3)
MCH RBC QN AUTO: 28 PG (ref 26.5–33)
MCHC RBC AUTO-ENTMCNC: 31.1 G/DL (ref 31.5–36.5)
MCV RBC AUTO: 90 FL (ref 78–100)
PLATELET # BLD AUTO: 212 10E9/L (ref 150–450)
POTASSIUM SERPL-SCNC: 4 MMOL/L (ref 3.4–5.3)
RBC # BLD AUTO: 3.53 10E12/L (ref 4.4–5.9)
SODIUM SERPL-SCNC: 135 MMOL/L (ref 133–144)
WBC # BLD AUTO: 6.5 10E9/L (ref 4–11)

## 2021-02-17 PROCEDURE — 250N000013 HC RX MED GY IP 250 OP 250 PS 637: Performed by: INTERNAL MEDICINE

## 2021-02-17 PROCEDURE — 250N000013 HC RX MED GY IP 250 OP 250 PS 637: Performed by: NURSE PRACTITIONER

## 2021-02-17 PROCEDURE — 999N000128 HC STATISTIC PERIPHERAL IV START W/O US GUIDANCE

## 2021-02-17 PROCEDURE — 93750 INTERROGATION VAD IN PERSON: CPT | Performed by: NURSE PRACTITIONER

## 2021-02-17 PROCEDURE — 99233 SBSQ HOSP IP/OBS HIGH 50: CPT | Mod: 25 | Performed by: NURSE PRACTITIONER

## 2021-02-17 PROCEDURE — 83735 ASSAY OF MAGNESIUM: CPT | Performed by: STUDENT IN AN ORGANIZED HEALTH CARE EDUCATION/TRAINING PROGRAM

## 2021-02-17 PROCEDURE — 97162 PT EVAL MOD COMPLEX 30 MIN: CPT | Mod: GP | Performed by: PHYSICAL THERAPIST

## 2021-02-17 PROCEDURE — 250N000013 HC RX MED GY IP 250 OP 250 PS 637: Performed by: STUDENT IN AN ORGANIZED HEALTH CARE EDUCATION/TRAINING PROGRAM

## 2021-02-17 PROCEDURE — 85610 PROTHROMBIN TIME: CPT | Performed by: STUDENT IN AN ORGANIZED HEALTH CARE EDUCATION/TRAINING PROGRAM

## 2021-02-17 PROCEDURE — 999N001017 HC STATISTIC GLUCOSE BY METER IP

## 2021-02-17 PROCEDURE — 97116 GAIT TRAINING THERAPY: CPT | Mod: GP | Performed by: PHYSICAL THERAPIST

## 2021-02-17 PROCEDURE — 97110 THERAPEUTIC EXERCISES: CPT | Mod: GO

## 2021-02-17 PROCEDURE — 99223 1ST HOSP IP/OBS HIGH 75: CPT | Mod: GC | Performed by: INTERNAL MEDICINE

## 2021-02-17 PROCEDURE — 80048 BASIC METABOLIC PNL TOTAL CA: CPT | Performed by: STUDENT IN AN ORGANIZED HEALTH CARE EDUCATION/TRAINING PROGRAM

## 2021-02-17 PROCEDURE — 250N000012 HC RX MED GY IP 250 OP 636 PS 637: Performed by: STUDENT IN AN ORGANIZED HEALTH CARE EDUCATION/TRAINING PROGRAM

## 2021-02-17 PROCEDURE — 97530 THERAPEUTIC ACTIVITIES: CPT | Mod: GP | Performed by: PHYSICAL THERAPIST

## 2021-02-17 PROCEDURE — 85027 COMPLETE CBC AUTOMATED: CPT | Performed by: STUDENT IN AN ORGANIZED HEALTH CARE EDUCATION/TRAINING PROGRAM

## 2021-02-17 PROCEDURE — 214N000001 HC R&B CCU UMMC

## 2021-02-17 PROCEDURE — 36415 COLL VENOUS BLD VENIPUNCTURE: CPT | Performed by: STUDENT IN AN ORGANIZED HEALTH CARE EDUCATION/TRAINING PROGRAM

## 2021-02-17 RX ORDER — ACETAMINOPHEN 325 MG/1
650 TABLET ORAL
Status: DISCONTINUED | OUTPATIENT
Start: 2021-02-17 | End: 2021-02-19

## 2021-02-17 RX ORDER — BUMETANIDE 2 MG/1
4 TABLET ORAL DAILY
Status: DISCONTINUED | OUTPATIENT
Start: 2021-02-17 | End: 2021-02-19

## 2021-02-17 RX ORDER — WARFARIN SODIUM 2.5 MG/1
2.5 TABLET ORAL
Status: COMPLETED | OUTPATIENT
Start: 2021-02-17 | End: 2021-02-17

## 2021-02-17 RX ADMIN — BUMETANIDE 4 MG: 2 TABLET ORAL at 17:25

## 2021-02-17 RX ADMIN — GUAIFENESIN 600 MG: 600 TABLET ORAL at 08:23

## 2021-02-17 RX ADMIN — ACETAMINOPHEN 650 MG: 325 TABLET, FILM COATED ORAL at 20:18

## 2021-02-17 RX ADMIN — AMIODARONE HYDROCHLORIDE 200 MG: 200 TABLET ORAL at 08:18

## 2021-02-17 RX ADMIN — FLUTICASONE FUROATE AND VILANTEROL TRIFENATATE 1 PUFF: 100; 25 POWDER RESPIRATORY (INHALATION) at 08:19

## 2021-02-17 RX ADMIN — ACETAMINOPHEN 650 MG: 325 TABLET, FILM COATED ORAL at 17:25

## 2021-02-17 RX ADMIN — DOCUSATE SODIUM 50 MG AND SENNOSIDES 8.6 MG 1 TABLET: 8.6; 5 TABLET, FILM COATED ORAL at 20:18

## 2021-02-17 RX ADMIN — ACETAMINOPHEN 650 MG: 325 TABLET, FILM COATED ORAL at 08:23

## 2021-02-17 RX ADMIN — GABAPENTIN 600 MG: 300 CAPSULE ORAL at 22:39

## 2021-02-17 RX ADMIN — ISOSORBIDE DINITRATE 10 MG: 10 TABLET ORAL at 14:14

## 2021-02-17 RX ADMIN — GUAIFENESIN 600 MG: 600 TABLET ORAL at 20:18

## 2021-02-17 RX ADMIN — ALBUTEROL SULFATE 2 PUFF: 90 AEROSOL, METERED RESPIRATORY (INHALATION) at 23:01

## 2021-02-17 RX ADMIN — ISOSORBIDE DINITRATE 10 MG: 10 TABLET ORAL at 08:18

## 2021-02-17 RX ADMIN — POTASSIUM CHLORIDE 60 MEQ: 750 TABLET, EXTENDED RELEASE ORAL at 08:18

## 2021-02-17 RX ADMIN — MONTELUKAST 10 MG: 10 TABLET, FILM COATED ORAL at 22:39

## 2021-02-17 RX ADMIN — HYDRALAZINE HYDROCHLORIDE 75 MG: 25 TABLET, FILM COATED ORAL at 08:19

## 2021-02-17 RX ADMIN — GABAPENTIN 300 MG: 300 CAPSULE ORAL at 14:14

## 2021-02-17 RX ADMIN — ALLOPURINOL 300 MG: 300 TABLET ORAL at 08:18

## 2021-02-17 RX ADMIN — POLYETHYLENE GLYCOL 3350 17 G: 17 POWDER, FOR SOLUTION ORAL at 08:20

## 2021-02-17 RX ADMIN — ASPIRIN 81 MG CHEWABLE TABLET 81 MG: 81 TABLET CHEWABLE at 08:18

## 2021-02-17 RX ADMIN — INSULIN ASPART 1 UNITS: 100 INJECTION, SOLUTION INTRAVENOUS; SUBCUTANEOUS at 12:14

## 2021-02-17 RX ADMIN — GABAPENTIN 300 MG: 300 CAPSULE ORAL at 08:19

## 2021-02-17 RX ADMIN — HYDRALAZINE HYDROCHLORIDE 75 MG: 25 TABLET, FILM COATED ORAL at 20:18

## 2021-02-17 RX ADMIN — ACETAMINOPHEN 650 MG: 325 TABLET, FILM COATED ORAL at 12:22

## 2021-02-17 RX ADMIN — BUPROPION HYDROCHLORIDE 150 MG: 150 TABLET, EXTENDED RELEASE ORAL at 08:18

## 2021-02-17 RX ADMIN — HYDRALAZINE HYDROCHLORIDE 75 MG: 25 TABLET, FILM COATED ORAL at 14:14

## 2021-02-17 RX ADMIN — UMECLIDINIUM 1 PUFF: 62.5 AEROSOL, POWDER ORAL at 08:19

## 2021-02-17 RX ADMIN — BUMETANIDE 4 MG: 2 TABLET ORAL at 08:23

## 2021-02-17 RX ADMIN — ISOSORBIDE DINITRATE 10 MG: 10 TABLET ORAL at 20:19

## 2021-02-17 RX ADMIN — WARFARIN SODIUM 2.5 MG: 2.5 TABLET ORAL at 17:25

## 2021-02-17 ASSESSMENT — ACTIVITIES OF DAILY LIVING (ADL)
ADLS_ACUITY_SCORE: 12

## 2021-02-17 ASSESSMENT — MIFFLIN-ST. JEOR: SCORE: 1759.25

## 2021-02-17 NOTE — PROGRESS NOTES
Brief Stroke Neurology Consult Progress Note    Patient seen this morning in follow-up of stroke code yesterday. He reports feeling well and is no longer having any imbalance or sensation of falling to his right. Neuro exam this morning entirely unremarkable. No new recommendations at this time. Stroke Neurology will sign off at this time but please feel free to call with any questions.     Tomás Polanco MD  Neurology Resident PGY-1  P: [phone]

## 2021-02-17 NOTE — CONSULTS
[facility] Surgery Consultation    Jim Willingham MRN# [id number]   Age: 65 year old YOB: 1955     Date of Admission:  2/8/2021    Date of Consult:   2/08/21    Reason for consult: Worsening carpal tunnel syndrome       Requesting service: Transplant surgery                   Assessment and Plan:   Assessment:   65-year-old D M with a history of nonischemic cardiomyopathy (EF 20%) c/b VT s/p cardiac resynchronization pacemaker, s/p LVAD 6/19/2017 currently awaiting transplantation. Admitted for worsening heart failure and WALTER on CKD stage III (Cr 1.63). Chronic conditions include TIIDM, COPD and Asthma and depression. On chronic anticoagulation with warfarin (goal INR = 2-3) Plastic surgery was consulted to discuss treatment options for worsened carpal tunnel syndrome, surgical release vs steroid injections. Patient is very interested in surgical treatment.            Plan:   - Patient would benefit from bilateral carpal tunnel release, will plan for tomorrow (2/18)  - Anesthesia eval based on ongoing cardiac issues, likely to utilize conscious sedation and local anesthetic (wrist block) to undergo procedure.  -Hydrocodone for post-surgical pain, based on patient concerns about oxycodone.      Jacinta Hunter, MS4  02/17/2021 9:50 AM    Jermaine Abreu MD PGY6  Plastic and Reconstructive Surgery RL1      Discussed with staff, Dr. Brand              Chief Complaint:   Pain and burning in fingers of each hand.         History of Present Illness:   Patient is a 66yo M with complains of stabbing, burning pain in the first four fingers of his right hand and first three of his left. He also has numbness in both hands that limits his ability to grasp objects, such as the zipper on his pants. Overall, the pain affects his right hand the most. He wakes up most nights with burning in his fingers and has to shake his wrists out to get comfortable again. He also occasionally notices pain that  shoots up his arms bilaterally. This began about 6 or 7 years ago, and he underwent steroid injections in 11/2020, which improved his pain but did not completely take his symptoms away. He has not had any other prior treatments for his symptoms. His symptoms began worsening again about two weeks ago.     He was admitted about one week ago for his current diagnosis of worsened heart failure. He is very interested in surgical treatment for his carpal tunnel. After a heart transplant, he would not be able to undergo steroid injections again for 6 months and he cannot imagine dealing with this pain in the interim. Of note, he recently took oxycodone for the past 7 days but he felt that this made him loopy and confused, so he would prefer a different treatment for pain, post-surgically. He thinks this is why he fell getting out of bed on 2/16, which resulted in a stroke code being called on him. He has had hydrocodone in the past and feels he tolerated this better.              Past Medical History:     Past Medical History:   Diagnosis Date     Benign essential hypertension 5/11/2017     Bilateral carpal tunnel syndrome 11/2/2020     Cardiomyopathy, unspecified (H) 5/8/2017     CKD (chronic kidney disease) stage 3, GFR 30-59 ml/min 5/11/2017     COPD (chronic obstructive pulmonary disease) (H) 11/2/2020     Depression 5/11/2017     Diabetes mellitus (H) 5/11/2017     Gouty arthropathy, chronic, without tophi 11/2/2020     H/O gastric bypass 5/11/2017     ICD (implantable cardioverter-defibrillator), biventricular, in situ 5/11/2017     LVAD (left ventricular assist device) present (H)      Major depression, recurrent, chronic (H) 11/2/2020     NICM (nonischemic cardiomyopathy) (H)/ EF 20% 5/11/2017    ECHO: LVEDd. 7.66 cm, Restrictive pattern , Severe mitral valve regurgitation     CECILIA (obstructive sleep apnea) 5/11/2017     Paroxysmal atrial fibrillation (H) 5/11/2017     Paroxysmal VT (H) 5/11/2017     Rotator cuff  tear arthropathy of both shoulders 2020     Uncomplicated asthma      Vitamin B12 deficiency (non anemic) 2017             Past Surgical History:     Previously underwent gastric bypass () and surgery for patellar fracture (). He did not have any wound healing issues, bleeding complications or problems with anesthesia during these surgeries.      Past Surgical History:   Procedure Laterality Date     ANESTHESIA CARDIOVERSION N/A 2020    Procedure: ANESTHESIA, FOR CARDIOVERSION @1100;  Surgeon: GENERIC ANESTHESIA PROVIDER;  Location: [facility] OR     CV RIGHT HEART CATH MEASUREMENTS RECORDED N/A 2019    Procedure: CV RIGHT HEART CATH;  Surgeon: Renu Sears MD;  Location:  HEART CARDIAC CATH LAB     CV RIGHT HEART CATH MEASUREMENTS RECORDED N/A 2020    Procedure: CV RIGHT HEART CATH;  Surgeon: Nicola Seth MD;  Location:  HEART CARDIAC CATH LAB     CV RIGHT HEART CATH MEASUREMENTS RECORDED N/A 2021    Procedure: CV RIGHT HEART CATH;  Surgeon: Jac Dover MD;  Location:  HEART CARDIAC CATH LAB     GI SURGERY      Sylvester en Y     INSERT VENTRICULAR ASSIST DEVICE LEFT (HEARTMATE II) N/A 2017    Procedure: INSERT VENTRICULAR ASSIST DEVICE LEFT (HEARTMATE II);  Median Sternotomy Heartmate II Left Ventricular Assist Device Insertion on Pump Oxygenator;  Surgeon: Ronnie Quigley MD;  Location:  OR     ORTHOPEDIC SURGERY      right knee wired     PICC DOUBLE LUMEN PLACEMENT Right 2020    5FR PICC DL. Length-43cm (1cm out).             Social History:     Previously worked as a respiratory therapist. Not an active smoker or drinker.    Social History     Tobacco Use     Smoking status: Former Smoker     Quit date:      Years since quittin.1     Smokeless tobacco: Never Used   Substance Use Topics     Alcohol use: No             Family History:     No family history of bleeding or clotting issues, or problems with anesthesia.    Family History    Problem Relation Age of Onset     Cerebrovascular Disease Mother 64     Diabetes Mother      Hypertension Mother      Coronary Artery Disease Father      Diabetes Type 2  Father      Obesity Brother      Obesity Brother      Cerebrovascular Disease Daughter 40                Allergies:     Allergies   Allergen Reactions     Beer Shortness Of Breath     Grass Shortness Of Breath     Ace Inhibitors Cough     Dust Mites Other (See Comments)     Asthma     Mold Other (See Comments)     Asthma     Penicillins Other (See Comments)     Unknown - childhood exposure    Tolerated Zosyn 9/18-9/20/2020     Sulfa Drugs Other (See Comments) and Unknown     Unknown childhood reaction             Medications:     Current Facility-Administered Medications   Medication     acetaminophen (TYLENOL) Suppository 650 mg     acetaminophen (TYLENOL) tablet 650 mg     albuterol (PROAIR HFA/PROVENTIL HFA/VENTOLIN HFA) 108 (90 Base) MCG/ACT inhaler 2 puff     allopurinol (ZYLOPRIM) tablet 300 mg     alum & mag hydroxide-simethicone (MAALOX) suspension 30 mL     amiodarone (PACERONE) tablet 200 mg     aspirin (ASA) chewable tablet 81 mg     bumetanide (BUMEX) tablet 4 mg     bumetanide (BUMEX) tablet 5 mg     buPROPion (WELLBUTRIN XL) 24 hr tablet 150 mg     glucose gel 15-30 g    Or     dextrose 50 % injection 25-50 mL    Or     glucagon injection 1 mg     fluticasone-vilanterol (BREO ELLIPTA) 100-25 MCG/INH inhaler 1 puff     gabapentin (NEURONTIN) capsule 300 mg     gabapentin (NEURONTIN) capsule 600 mg     guaiFENesin (MUCINEX) 12 hr tablet 600 mg     hydrALAZINE (APRESOLINE) tablet 75 mg     insulin aspart (NovoLOG) injection (RAPID ACTING)     insulin aspart (NovoLOG) injection (RAPID ACTING)     isosorbide dinitrate (ISORDIL) tablet 10 mg     lidocaine (LMX4) cream     lidocaine 1 % 0.1-1 mL     medication instruction     melatonin tablet 10 mg     montelukast (SINGULAIR) tablet 10 mg     naloxone (NARCAN) injection 0.2 mg    Or      naloxone (NARCAN) injection 0.4 mg    Or     naloxone (NARCAN) injection 0.2 mg    Or     naloxone (NARCAN) injection 0.4 mg     nitroGLYcerin (NITROSTAT) sublingual tablet 0.4 mg     oxyCODONE (ROXICODONE) tablet 5-10 mg     Patient is already receiving anticoagulation with heparin, enoxaparin (LOVENOX), warfarin (COUMADIN)  or other anticoagulant medication     polyethylene glycol (MIRALAX) Packet 17 g     polyethylene glycol (MIRALAX) Packet 17 g     [START ON 2/17/2021] potassium chloride ER (KLOR-CON M) CR tablet 60 mEq     Reason ACE/ARB/ARNI order not selected     senna-docusate (SENOKOT-S/PERICOLACE) 8.6-50 MG per tablet 1 tablet     senna-docusate (SENOKOT-S/PERICOLACE) 8.6-50 MG per tablet 2 tablet     sodium chloride (PF) 0.9% PF flush 3 mL     sodium chloride (PF) 0.9% PF flush 3 mL     umeclidinium (INCRUSE ELLIPTA) 62.5 MCG/INH inhaler 1 puff     Warfarin Therapy Reminder (Check START DATE - warfarin may be starting in the FUTURE)               Review of Systems:   CONSTITUTIONAL: NEGATIVE for fever, chills, change in weight  RESP: NEGATIVE for significant cough or SOB  CV: NEGATIVE for chest pain, palpitations or peripheral edema  C: NEGATIVE for fever, chills, change in weight  E/M: NEGATIVE for ear, mouth and throat problems  R: NEGATIVE for significant cough or SOB          Physical Exam:   All vitals have been reviewed  Temp:  [98.2  F (36.8  C)-98.6  F (37  C)] 98.5  F (36.9  C)  Pulse:  [63-98] 68  Resp:  [16-18] 16  BP: (79-97)/(56-78) 85/78  Cuff Mean (mmHg):  [80-84] 80  SpO2:  [91 %-97 %] 93 %    Intake/Output Summary (Last 24 hours) at 2/16/2021 2022  Last data filed at 2/16/2021 1916  Gross per 24 hour   Intake 1680 ml   Output 2850 ml   Net -1170 ml     Physical Exam:    General: awake, alert and sitting at the edge of the bed.  CV: LVAD device at the bedside, extremities warm and well perfused.  Pulm: breathing comfortably on room air.  Upper Extremities: No gross deformity. Overlying  skin intact and normal in color. No swelling or erythema. Thenar eminences appear decreased bilaterally. Wrist flexion and extension limited by discomfort in fingers. Tinel sign positive on the right, negative on left. Phalen sign positive bilaterally.          Data:   All laboratory data reviewed    Results:  BMP  Recent Labs   Lab 02/16/21  0532 02/15/21  0553 02/14/21  0604 02/13/21  0608   * 134 135 132*   POTASSIUM 4.2 4.0 4.5 4.2   CHLORIDE 97 99 99 98   CO2 31 28 30 28   BUN 36* 39* 37* 37*   CR 1.63* 1.65* 1.72* 1.75*   * 102* 106* 94     CBC  Recent Labs   Lab 02/15/21  0553 02/14/21  0604 02/13/21  0608 02/12/21  0925   WBC 5.9 8.7 12.1* 5.5   HGB 10.7* 10.8* 9.9* 10.9*    236 234 248     LFT  Recent Labs   Lab 02/16/21  0859 02/16/21  0532 02/15/21  0553 02/14/21  0604 02/13/21  0608 02/10/21  0539 02/10/21  0539   AST 38 34  --   --  28  --  38   ALT 39 39  --   --  40  --  47   ALKPHOS 104 99  --   --  97  --  108   BILITOTAL 0.7 0.6  --   --  0.6  --  0.6   ALBUMIN 3.3* 3.2*  --   --  3.2*  --  3.3*   INR  --  2.50* 2.27* 2.26* 2.34*   < > 2.19*    < > = values in this interval not displayed.     Recent Labs   Lab 02/16/21 1717 02/16/21  0821 02/16/21  0717 02/16/21  0532 02/15/21  2203 02/15/21  1711 02/15/21  0649 02/15/21  0553 02/14/21  0604 02/14/21  0604 02/13/21  0608 02/13/21  0608 02/12/21  0525 02/12/21  0525 02/11/21  0614 02/11/21  0614   GLC  --   --   --  116*  --   --   --  102*  --  106*  --  94  --  103*  --  91   * 130* 111*  --  176* 124* 120*  --    < >  --    < >  --    < >  --    < >  --     < > = values in this interval not displayed.       Imaging:  Recent Results (from the past 24 hour(s))   CT Head w/o Contrast    Narrative    CT HEAD W/O CONTRAST 2/16/2021 8:50 AM    History: Neuro deficit, acute, stroke suspected   ICD-10:    Comparison: CT head 7/23/2020.    Technique: Using multidetector thin collimation helical acquisition  technique, axial,  coronal and sagittal CT images from the skull base  to the vertex were obtained without intravenous contrast.    Findings: There is no intracranial hemorrhage, mass effect, or midline  shift. Gray/white matter differentiation in both cerebral hemispheres  is preserved. Patchy low-attenuation foci in the periventricular and  subcortical white matter. Ventricles are proportionate to the cerebral  sulci. The basal cisterns are clear.    The bony calvaria and the bones of the skull base are normal. Mucosal  thickening and air-fluid levels with frothy debris in the bilateral  maxillary sinuses, as well as in the anterior ethmoid air cells. The  mastoid air cells are clear.       Impression    Impression:  1. No acute intracranial pathology.    2. Acute maxillary sinusitis.    I have personally reviewed the examination and initial interpretation  and I agree with the findings.    MD Jermaine KEENE MD PGY6  Plastic and Reconstructive Surgery RL1

## 2021-02-17 NOTE — PLAN OF CARE
D- decompensated HF, status 2E for heart transplant  I/A- SR/ST with first degree AVB and BBB. VSS. RA. HM2 numbers WNL. Scheduled tylenol for pain. Pt steady on feet today. PT/OT. Tolerating diet. BM today. Plan for carpal tunnel surgery tomorrow, consent in chart.   P- Monitoring.

## 2021-02-17 NOTE — PROGRESS NOTES
D: Stopped in patient room for routine virtual VAD Coordinator rounding. Pt asleep. No need to wake. Chart reviewed and no VAD concerns needs at this time.

## 2021-02-17 NOTE — CONSULTS
[facility] Rheumatology Consultation    Jim Willingham MRN# [id number]   Age: 65 year old YOB: 1955     Date of Admission:  2/8/2021  Date of service 2/17/21  Reason for consult: Gout flare       Requesting physician: SHAMIKA Harper       Level of consult: Consult and follow for daily recommendations           Assessment and Plan:   Assessment:  Mr. Willingham is a 65 year old male with PMH of gout, NICM EF 20% c/b VT s/p CRT-D s/p HMII LVAD (6/2017), AFib, history of VT/VF, T2DM, carpal tunnel syndrome, history of staph hominis bacteremia (09/2020) who was admitted on 2/8/21 with chief complaint of worsening RESENDIZ and lethargy found to have worsening functional status and renal function concerning for worsening heart failure. His LVAD was initially meant for destination therapy but due to significant weight loss, the patient is now eligible for transplant. He is now listed status 2E for transplant. His hospital course has been complicated by rhinovirus infection (2/12/21) and now gout flare for which Rheumatology was consulted.     Acute on chronic polyarticular gout   NICM EF 20% c/b VT s/p CRT-D s/p HMII LVAD (6/2017) on transplant list   Prior to admission, the patient was on 300mg Allopurinol and 5mg Prednisone daily for gout. He was on Prednisone to prevent paradoxical flaring of gout. Last uric acid level was 3.9 in 02/2021 with goal <5.0. His Allopurinol was continued on admission, but his Prednisone was held due to history of staph hominis bacteremia (09/2020) and transplant listing. He developed acute pain in his left hand and right ankle and was given 0.6mg Colchicine for gout flare but continues to have significant pain. Rheumatology was consulted for further management. Agree that the patient should not receive systemic steroids for flare at this time given history of bacteremia and current transplant listing. He is not a good candidate for NSAIDs based on cardiac and renal history.  While Colchicine could be used, it is often not effective if started 24 hours after flare. Therefore, we would recommend starting Anakinra for flare. We will defer to Ortho regarding timing of Anakinra dosing (supposed to undergo CT surgery 2/18) however we think it would be okay to give 1st dose today based on the following research: https://pubmed.ncbi.nlm.nih.gov/52894895/#:~:text=Conclusion%3A%20To%20our%20knowledge%2C%20this,days%20of%20the%20first%20dose.     - 100 mg SQ Anakinra x 3 days  - Defer to Ortho on timing of 1st Anakinra dose but think dose 2/17 would be okay based on above research article  - No need for serum urate level at this time  - We will continue to follow.                        Chief Complaint:   Left hand and ankle pain      History is obtained from the patient         History of Present Illness:   Mr. Willingham is a 65 year old male with PMH of gout, NICM EF 20% c/b VT s/p CRT-D s/p HMII LVAD (6/2017), AFib, history of VT/VF, T2DM, carpal tunnel syndrome, history of staph hominis bacteremia (09/2020) who was admitted on 2/8/21 with chief complaint of worsening RESENDIZ and lethargy found to have worsening functional status and renal function concerning for worsening heart failure. His LVAD was initially meant for destination therapy but due to significant weight loss, the patient is now eligible for transplant. He is now listed status 2E for transplant. His hospital course has been complicated by rhinovirus infection (2/12/21) and now gout flare for which Rheumatology was consulted.     The patient is followed by Dr. Pettit as an outpatient for gout. Prior to admission, he was taking 300mg Allopurinol daily and Prednisone 5mg daily for paradoxical flare prophylaxis. His last uric acid was 3.9 (2/1/2021). Upon admission, his Prednisone was discontinued due to history of staph hominis bacteremia (09/2021) and transplant candidacy.     The patient states that two days ago, his left hand start  to hurt and became swollen. Yesterday, the pain became significantly worse and he also developed pain in his right ankle. The patient has never had gout flare in his hands before. His last flare was located in his knee for which he received an intraarticular injection. The Colchicine did not help symptoms at all.     The patient denies fever, chills, night sweats, pain in any other joints other than mentioned above, and rash.            Past Medical History:   Gout   NICM with EF 20% c/b VT s/p CRT-D s/p HMII LVAD (6/2017)  AFib  Hx of VT/VF  T2DM  Carpal tunnel syndrome  CKD III  CECILIA  COPD  Hx of staph hominis bacteremia           Past Surgical History:     Past Surgical History:   Procedure Laterality Date     ANESTHESIA CARDIOVERSION N/A 5/11/2020    Procedure: ANESTHESIA, FOR CARDIOVERSION @1100;  Surgeon: GENERIC ANESTHESIA PROVIDER;  Location: [facility]     CV RIGHT HEART CATH MEASUREMENTS RECORDED N/A 7/24/2019    Procedure: CV RIGHT HEART CATH;  Surgeon: Renu Sears MD;  Location:  HEART CARDIAC CATH LAB     CV RIGHT HEART CATH MEASUREMENTS RECORDED N/A 8/5/2020    Procedure: CV RIGHT HEART CATH;  Surgeon: Nicola Seth MD;  Location:  HEART CARDIAC CATH LAB     CV RIGHT HEART CATH MEASUREMENTS RECORDED N/A 1/7/2021    Procedure: CV RIGHT HEART CATH;  Surgeon: Jac Dover MD;  Location:  HEART CARDIAC CATH LAB     GI SURGERY  2003    [facility]     INSERT VENTRICULAR ASSIST DEVICE LEFT (HEARTMATE II) N/A 6/19/2017    Procedure: INSERT VENTRICULAR ASSIST DEVICE LEFT (HEARTMATE II);  Median Sternotomy Heartmate II Left Ventricular Assist Device Insertion on Pump Oxygenator;  Surgeon: Ronnie Quigley MD;  Location:  OR     ORTHOPEDIC SURGERY  1994    right knee wired     PICC DOUBLE LUMEN PLACEMENT Right 09/23/2020    5FR PICC DL. Length-43cm (1cm out).             Social History:   Retired respiratory therapist.   Lives at home with his wife and great-granddaughter.   Denies EtOH, tobacco,  or illicit drug use.        Family History:     Family History   Problem Relation Age of Onset     Cerebrovascular Disease Mother 64     Diabetes Mother      Hypertension Mother      Coronary Artery Disease Father      Diabetes Type 2  Father      Obesity Brother      Obesity Brother      Cerebrovascular Disease Daughter 40     Family history reviewed and updated in EPIC and reviewed          Immunizations:     Immunization History   Administered Date(s) Administered     Influenza Quad, Recombinant, p-free (RIV4) 01/24/2020     Influenza, Quad, High Dose, Pf, 65yr + 09/26/2020     Tdap (Adacel,Boostrix) 11/02/2020             Allergies:   All allergies reviewed and addressed          Medications:     Current Facility-Administered Medications   Medication     acetaminophen (TYLENOL) tablet 650 mg     albuterol (PROAIR HFA/PROVENTIL HFA/VENTOLIN HFA) 108 (90 Base) MCG/ACT inhaler 2 puff     allopurinol (ZYLOPRIM) tablet 300 mg     alum & mag hydroxide-simethicone (MAALOX) suspension 30 mL     amiodarone (PACERONE) tablet 200 mg     aspirin (ASA) chewable tablet 81 mg     bumetanide (BUMEX) tablet 4 mg     bumetanide (BUMEX) tablet 5 mg     buPROPion (WELLBUTRIN XL) 24 hr tablet 150 mg     glucose gel 15-30 g    Or     dextrose 50 % injection 25-50 mL    Or     glucagon injection 1 mg     fluticasone-vilanterol (BREO ELLIPTA) 100-25 MCG/INH inhaler 1 puff     gabapentin (NEURONTIN) capsule 300 mg     gabapentin (NEURONTIN) capsule 600 mg     guaiFENesin (MUCINEX) 12 hr tablet 600 mg     hydrALAZINE (APRESOLINE) tablet 75 mg     insulin aspart (NovoLOG) injection (RAPID ACTING)     insulin aspart (NovoLOG) injection (RAPID ACTING)     isosorbide dinitrate (ISORDIL) tablet 10 mg     lidocaine (LMX4) cream     lidocaine 1 % 0.1-1 mL     medication instruction     melatonin tablet 10 mg     montelukast (SINGULAIR) tablet 10 mg     naloxone (NARCAN) injection 0.2 mg    Or     naloxone (NARCAN) injection 0.4 mg    Or      naloxone (NARCAN) injection 0.2 mg    Or     naloxone (NARCAN) injection 0.4 mg     nitroGLYcerin (NITROSTAT) sublingual tablet 0.4 mg     oxyCODONE (ROXICODONE) tablet 5-10 mg     Patient is already receiving anticoagulation with heparin, enoxaparin (LOVENOX), warfarin (COUMADIN)  or other anticoagulant medication     polyethylene glycol (MIRALAX) Packet 17 g     polyethylene glycol (MIRALAX) Packet 17 g     potassium chloride ER (KLOR-CON M) CR tablet 60 mEq     Reason ACE/ARB/ARNI order not selected     senna-docusate (SENOKOT-S/PERICOLACE) 8.6-50 MG per tablet 1 tablet     senna-docusate (SENOKOT-S/PERICOLACE) 8.6-50 MG per tablet 2 tablet     sodium chloride (PF) 0.9% PF flush 3 mL     sodium chloride (PF) 0.9% PF flush 3 mL     umeclidinium (INCRUSE ELLIPTA) 62.5 MCG/INH inhaler 1 puff     Warfarin Therapy Reminder (Check START DATE - warfarin may be starting in the FUTURE)             Review of Systems:   CONSTITUTIONAL: Negative for fever, chills   INTEGUMENTARY/SKIN: NEGATIVE for rashes   EYES: NEGATIVE for vision changes or irritation  ENT/MOUTH: NEGATIVE for oral lesions   RESP: NEGATIVE for significant cough or SOB  CV: NEGATIVE for chest pain, palpitations or peripheral edema  GI: NEGATIVE for nausea, abdominal pain, heartburn, or change in bowel habits  MUSCULOSKELETAL: POSITIVE  for joint pain left 2nd, 3rd, and 4th MCPs and right ankle mortis with associated swelling    NEURO: Positive for confusion, now resolved   ENDOCRINE: NEGATIVE for temperature intolerance, skin/hair changes    14 point review of systems collected and negative if not documented above.          Physical Exam (Resident / Clinician):   Vitals were reviewed    General: Lying in bed in NAD  HEENT: NCAT, EOMI, no oral lesions  Cardiac: Warm and well perfused, LVAD in place   Respiratory: Breathing comfortably on room air  GI: No abdominal distention  Extremities: No peripheral edema  Neuro: AOx3, answers questions appropriately  MSK:  Swollen and tender left 2nd, 3rd, 4th MCP joints, swollen but nontender left wrist; right ankle mortis swelling and tenderness.   Skin: No rash           Data:   All labs reviewed by me.    Pertinent labs:   Na 135, K 4.0, BUN 37, Cr 1.69, Mg 2.5   WBC 6.5, Hb 9.9, Plt 212   Uric acid (2/1/2021): 3.9    All cardiac studies reviewed by me.  All imaging studies reviewed by me.    Rajani Mantilla DO  PGY-3 Internal Medicine   [phone]    Seen and discussed with my attending, Dr. Pettit who agrees with above.      Staff addendum  I performed the history and physical examination of the patient and discussed the management with the resident. I reviewed the available lab and imaging studies. I reviewed the resident s note and agree with the documented findings and plan of care.    Elbert Pettit MD  Rheumatology

## 2021-02-17 NOTE — PROGRESS NOTES
[facility]   Cardiology II Service / Advanced Heart Failure  Device Interrogation Note  Date of Service: 2/17/2021    The patient's HeartMate LVAD was interrogated 2/17/2021  * Speed 9600 rpm   * Pulsatility index 3.7   * Power 6.6 Manuel   * Flow 6.4 L/minute   Fluid status: Euvolemic   Alarms were reviewed, and notable for frequent PI events with lowest PI 2.9.    The driveline exit site was covered with dressing clean, dry, and intact.   All external components were inspected and showed no evidence of damage or malfunction.    Soraya Marshall, TIM CNP  2/17/2021

## 2021-02-17 NOTE — PROGRESS NOTES
[facility]   Cardiology II Service / Advanced Heart Failure  Daily Progress Note  Date of Service: 2/17/2021      Patient: Jim Willingham  MRN: [id number]  Admission Date: 2/8/2021  Hospital Day # 9    Assessment and Plan: Jim Willingham is a 65 year old male with history of NICM EF 20% c/b VT s/p CRT-D s/p HMII LVAD 6/19/2017 originally intended as destination therapy 2/2 obesity however with recent weight loss now candidate for transplantation. He is admitted for worsening functional status and renal function concerning for worsening heart failure. He is now listed status 2E for transplant, extension due 2/22/21.     Chronic systolic heart failure secondary to NICM s/p HM II LVAD. Echo 1/8/21 at 9400rpm, EF<30%m LVIDd 6.8cm, at least mildly reduced RV function, AoV closed without AI, mild-mod eccentric MR, dilated IVC without collapse.   Stage D, NYHA Class IIIB  ACEi/ARB contraindicated due to renal dysfunction. Hydralazine 75 mg po TID. Isordil 10 mg po TID.   BB contraindicated due to low cardiac output  Aldosterone antagonist contraindicated due to renal dysfunction  SCD prophylaxis CRT-D  Fluid status Euvolemic. Bumex 4 mg po BID.   MAP: 75  LDH: 303 2/16  Anticoagulation: Coumadin per pharmacy. INR- 2.25, goal 2-3.   Antiplatelet: ASA 81 mg po daily  - remains on the cardiac transplant list status 2E.     Non-traumatic Fall.   - Wean oxycodone as able.   - Appreciate PT/OT consult.      Carpel tunnel, bilateral. Evidence of thenar atrophy. Relief with steroid injection 11/20.   - Appreciate Ortho consult. Case discussed with Ortho.   - scheduled for surgery in AM per ortho. Will decrease Coumadin dose tonight.   - Wean oxycodone as able.   - Schedule Tylenol 650 mg po QID.   - Gabapentin 300 mg in AM, 300 mg in the afternoon, and 600 mg in the evening.      WALTER on CKD Stage III. Cr peaked at 2.22  - Cr-1.69     History of Afib. History of VT/VF. CHADSVASC-5.   - Continue Amiodarone 200 mg po  "daily.   - Coumadin as above.     Acute Gout.   - Minimal improvement with Colchicine times one.   - Appreciate Rheumatology consult.      Rhinovirus. Bacterial bronchitis. Completed 5 day course of Cefdinir. COVID negative 2/8. Resp PCR +rhinovirus. CXR 2/12 with no overt PNA. Stopped cefdinir 2/15.   - ID recommends ideally defer transplant until 2/19.   - repeat RVP-3/5 per transplant ID     Chronic/resolved conditions  DM type II, controlled: Hgb A1C-6.2. Glargine stopped per patient request on 2/14/21, medium dose Novolog sliding scale insulin.  COPD/Asthma: pta Breo Ellipta, albuterol prn.   Depression: pta Bupropion  Right sided weakness, resolved. CT head negative for acute findings. Appreciate Neuro eval.   Staph Hominis Bacteremia. No need for surveillance blood cx per transplant ID.       FEN: 2 gram sodium diet   PROPHY:  Coumadin  LINES:  PIV   DISPO:  TBD pending cardiac transplant.   CODE STATUS: Full Code   ================================================================  Interval History/ROS: He complains of persistent bilateral wrist pain and right ankle pain. He denies fever, chills, chest pain, palpitations, cough, nausea, vomiting, diarrhea, melena, hematochezia, concerns at his LVAD drive line site, and LE edema. He is tolerating oral intake. He notes limited ambulation due to pain.     Last 24 hr care team notes reviewed.   ROS:  4 point ROS including Respiratory, CV, GI and , other than that noted in the HPI, is negative.     Medications: Reviewed in EPIC.     Physical Exam:   BP (!) 80/74 (BP Location: Left arm)   Pulse 68   Temp 98.1  F (36.7  C) (Oral)   Resp 16   Ht 1.727 m (5' 8\")   Wt 99 kg (218 lb 3.2 oz)   SpO2 94%   BMI 33.18 kg/m    GENERAL: Appears alert and oriented times three.   HEENT: Eye symmetrical and free of discharge bilaterally. Mucous membranes moist and without lesions.  NECK: Supple and without lymphadenopathy. JVD 8-10 cm.   CV: RRR, S1S2 present with LVAD " hum.   RESPIRATORY: Respirations regular, even, and unlabored. Lungs CTA throughout.   GI: Soft and non distended with normoactive bowel sounds present in all quadrants. No tenderness, rebound, guarding. No organomegaly.   EXTREMITIES: No peripheral edema. 2+ bilateral pedal pulses.   NEUROLOGIC: Alert and orientated x 3. CN II-XII grossly intact. No focal deficits.   MUSCULOSKELETAL: No joint swelling or tenderness.   SKIN: No jaundice. No rashes or lesions. LVAD drive line covered.     Data:  CMP  Recent Labs   Lab 02/17/21 0527 02/16/21  0859 02/16/21  0532 02/15/21  0553 02/14/21  0604 02/13/21  0608     --  132* 134 135 132*   POTASSIUM 4.0  --  4.2 4.0 4.5 4.2   CHLORIDE 101  --  97 99 99 98   CO2 30  --  31 28 30 28   ANIONGAP 5  --  3 7 6 6   *  --  116* 102* 106* 94   BUN 37*  --  36* 39* 37* 37*   CR 1.69*  --  1.63* 1.65* 1.72* 1.75*   GFRESTIMATED 42*  --  44* 43* 41* 40*   GFRESTBLACK 49*  --  51* 50* 48* 47*   QAMAR 8.7  --  8.8 9.0 9.1 8.8   MAG 2.5*  --  2.4* 2.4* 2.4* 2.4*   PROTTOTAL  --  6.8 6.6*  --   --  6.5*   ALBUMIN  --  3.3* 3.2*  --   --  3.2*   BILITOTAL  --  0.7 0.6  --   --  0.6   ALKPHOS  --  104 99  --   --  97   AST  --  38 34  --   --  28   ALT  --  39 39  --   --  40     CBC  Recent Labs   Lab 02/17/21  0527 02/15/21  0553 02/14/21  0604 02/13/21  0608   WBC 6.5 5.9 8.7 12.1*   RBC 3.53* 3.86* 4.07* 3.71*   HGB 9.9* 10.7* 10.8* 9.9*   HCT 31.8* 35.0* 36.8* 32.7*   MCV 90 91 90 88   MCH 28.0 27.7 26.5 26.7   MCHC 31.1* 30.6* 29.3* 30.3*   RDW 19.6* 19.4* 19.5* 19.2*    238 236 234     INR  Recent Labs   Lab 02/17/21  0527 02/16/21  0532 02/15/21  0553 02/14/21  0604   INR 2.25* 2.50* 2.27* 2.26*       Patient discussed with Dr. Seth.      Soraya Marshall FNP  2/17/2021

## 2021-02-17 NOTE — PROGRESS NOTES
02/17/21 0910   Quick Adds   Type of Visit Initial PT Evaluation   Living Environment   People in home grandchild(lidia);spouse   Current Living Arrangements house   Home Accessibility stairs to enter home;stairs within home   Number of Stairs, Main Entrance 1   Stair Railings, Main Entrance none   Number of Stairs, Within Home, Primary 8   Stair Railings, Within Home, Primary railing on right side (ascending)   Transportation Anticipated car, drives self;family or friend will provide   Living Environment Comments Pt lives with wife, stairs to bed/bath in split level, wife is currently on leave from work since hip replacement in august, pt/wife caretaker for 6 year old granddaughter, now in school 5x per week   Self-Care   Usual Activity Tolerance moderate   Current Activity Tolerance fair   Regular Exercise No   Equipment Currently Used at Home none   Activity/Exercise/Self-Care Comment has a quad cane that he uses prn for gout pain (RLE). Pt notes recent decline in activity tolerance over past 6 weeks, generally inactive due to COVID over past year   Disability/Function   Fall history within last six months yes   Number of times patient has fallen within last six months 1   Change in Functional Status Since Onset of Current Illness/Injury yes   General Information   Onset of Illness/Injury or Date of Surgery 02/08/21   Referring Physician Renu Sears MD   Patient/Family Therapy Goals Statement (PT) awaiting heart transplant    Pertinent History of Current Problem (include personal factors and/or comorbidities that impact the POC) Heart failure patient of Dr. Montgomery who presents for decompensated heart failure. Medical history significant for NICM EF 20% c/b VT s/p CRT-D s/p HMII LVAD 6/19/2017 originally intended as destination therapy 2/2 obesity, recent weight loss, atrial fibrillation s/p DCCV on amiodarone and warfarin, history of staph hominis bacteremia, CKD stage 3, COPD who presents for  worsening dyspnea on exertion, lethargy.    Existing Precautions/Restrictions fall   Heart Disease Risk Factors Medical history;Age;Race   General Observations activity orders: ambulate   Cognition   Orientation Status (Cognition) oriented x 4   Affect/Mental Status (Cognition) WNL   Follows Commands (Cognition) WNL   Pain Assessment   Patient Currently in Pain Yes, see Vital Sign flowsheet  (RLE gout, B wrists)   Integumentary/Edema   Integumentary/Edema Comments L hand swelling   Posture    Posture Protracted shoulders;Forward head position   Range of Motion (ROM)   ROM Quick Adds ROM WFL   Strength Comprehensive (MMT)   Comment, General Manual Muscle Testing (MMT) Assessment not overtly tested, generalized weakness per functional mobility    Bed Mobility   Comment (Bed Mobility) SBA bed mobility   Transfers   Transfer Safety Comments SBA-CGA sit to stand transfers    Gait/Stairs (Locomotion)   Comment (Gait/Stairs) CGA-min A for gait without AD   Balance   Balance Comments unsteady without AD    Sensory Examination   Sensory Perception Comments numbness/tingling in B hands (carpal tunnel), numbness in B bottom of feet   Clinical Impression   Criteria for Skilled Therapeutic Intervention yes, treatment indicated   PT Diagnosis (PT) generalized weakness, impaired functional mobility   Influenced by the following impairments LE weakness, pain, numbness, balance deficits    Functional limitations due to impairments limited activity tolerance, impaired safety with gait   Clinical Presentation Evolving/Changing   Clinical Presentation Rationale pt with complex medical history, changing presentation    Clinical Decision Making (Complexity) moderate complexity   Therapy Frequency (PT) 5x/week   Predicted Duration of Therapy Intervention (days/wks) 2 weeks    Planned Therapy Interventions (PT) balance training;gait training;home exercise program;neuromuscular re-education;stair training;strengthening   Risk & Benefits of  therapy have been explained patient;risks/benefits reviewed;care plan/treatment goals reviewed;evaluation/treatment results reviewed   PT Discharge Planning    PT Discharge Recommendation (DC Rec) home with assist;home with home care physical therapy   PT Rationale for DC Rec pt near his functional baseline but with increased RLE pain limiting balance and safety. pt motivated to return to PLOF, at this time would benefit from 24hr assist from wife and HHPT to progress strength, IND, and safety at home   PT Brief overview of current status  A x 1 with gait belt for gait    Total Evaluation Time   Total Evaluation Time (Minutes) 9

## 2021-02-17 NOTE — PLAN OF CARE
D: Admitted with decompensated HF-c/o RESENDIZ/fatigue  PMH: NICM EF 20% c/b VT s/p CRT-D s/p HMII LVAD 6/19/2017 , Afib-s/p DCCV, CKD3,COPD  He is now listed status 2E for transplant, extension due 2/22/21.     I: Monitored vitals and assessed pt status.   Running: SL'd  PRN: none    A: A0x4. VSS, on RA. Monitor SR 70-90s w/1st degree AVB 0-8 PVC w/occ cplt, 0-8 PAC. Last BM 2/13. Good UO. LVAD #s WNL-no issues or alarms overnight. Pt slept well overnight.    I/O this shift:  In: -   Out: 475 [Urine:475]    Temp:  [98.2  F (36.8  C)-99.5  F (37.5  C)] 99.5  F (37.5  C)  Pulse:  [63-98] 74  Resp:  [16-18] 16  BP: (79-97)/(53-78) 87/53  Cuff Mean (mmHg):  [80] 80  SpO2:  [91 %-96 %] 93 %      P: Continue to monitor Pt status and report changes to treatment team.  Status 2E for heart transplant

## 2021-02-17 NOTE — PROGRESS NOTES
LVAD/Transplant Social Work Services Progress Note      Date of Initial Social Work Evaluation: 2/10/2021  Collaborated with: Pt and his wife, Cate, at bedside     Data: Pt is an LVAD pt now listed status 2E for heart transplant. Pt reports difficult day yesterday with concern for stroke that was ruled out. Today pt reports he is eager for his bilateral carpal tunnel surgery tomorrow as this has been very painful and bothersome to him.   Writer has spoken to pt's wife, Cate, several times, but today was first day writer had met her in person. Per pt request, provided her with copy of new HCD that was completed last week.    Intervention: Supportive Visit   Assessment: Pt reports he is feeling much today then yesterday which he reports was quite stressful. Pt's wife also acknowledges that yesterday was a stressful day as she has never seen pt so not like himself. Wife reports that pt looks better today and more like himself. Wife appreciative of communication she received yesterday and today.   Education provided by SW: Ongoing Social Work support, Virtual Heart Transplant Support group  Plan:    Discharge Plans in Progress: None     Barriers to d/c plan: Pt listed status 2E for heart     Follow up Plan: SW to continue to follow for emotional support and assess ongoing coping and behavioral health needs as pt waits for heart transplant

## 2021-02-18 LAB
ANION GAP SERPL CALCULATED.3IONS-SCNC: 6 MMOL/L (ref 3–14)
BUN SERPL-MCNC: 44 MG/DL (ref 7–30)
CALCIUM SERPL-MCNC: 9 MG/DL (ref 8.5–10.1)
CHLORIDE SERPL-SCNC: 101 MMOL/L (ref 94–109)
CO2 SERPL-SCNC: 31 MMOL/L (ref 20–32)
CREAT SERPL-MCNC: 1.77 MG/DL (ref 0.66–1.25)
GFR SERPL CREATININE-BSD FRML MDRD: 40 ML/MIN/{1.73_M2}
GLUCOSE BLDC GLUCOMTR-MCNC: 124 MG/DL (ref 70–99)
GLUCOSE BLDC GLUCOMTR-MCNC: 193 MG/DL (ref 70–99)
GLUCOSE BLDC GLUCOMTR-MCNC: 98 MG/DL (ref 70–99)
GLUCOSE SERPL-MCNC: 94 MG/DL (ref 70–99)
INR PPP: 1.93 (ref 0.86–1.14)
MAGNESIUM SERPL-MCNC: 2.6 MG/DL (ref 1.6–2.3)
POTASSIUM SERPL-SCNC: 3.7 MMOL/L (ref 3.4–5.3)
SODIUM SERPL-SCNC: 138 MMOL/L (ref 133–144)

## 2021-02-18 PROCEDURE — 250N000013 HC RX MED GY IP 250 OP 250 PS 637: Performed by: INTERNAL MEDICINE

## 2021-02-18 PROCEDURE — 01N50ZZ RELEASE MEDIAN NERVE, OPEN APPROACH: ICD-10-PCS | Performed by: PLASTIC SURGERY

## 2021-02-18 PROCEDURE — 93750 INTERROGATION VAD IN PERSON: CPT | Performed by: NURSE PRACTITIONER

## 2021-02-18 PROCEDURE — 999N001017 HC STATISTIC GLUCOSE BY METER IP

## 2021-02-18 PROCEDURE — 80048 BASIC METABOLIC PNL TOTAL CA: CPT | Performed by: STUDENT IN AN ORGANIZED HEALTH CARE EDUCATION/TRAINING PROGRAM

## 2021-02-18 PROCEDURE — 250N000009 HC RX 250: Performed by: PLASTIC SURGERY

## 2021-02-18 PROCEDURE — 250N000013 HC RX MED GY IP 250 OP 250 PS 637: Performed by: NURSE PRACTITIONER

## 2021-02-18 PROCEDURE — 999N000141 HC STATISTIC PRE-PROCEDURE NURSING ASSESSMENT: Performed by: PLASTIC SURGERY

## 2021-02-18 PROCEDURE — 214N000001 HC R&B CCU UMMC

## 2021-02-18 PROCEDURE — 36415 COLL VENOUS BLD VENIPUNCTURE: CPT | Performed by: STUDENT IN AN ORGANIZED HEALTH CARE EDUCATION/TRAINING PROGRAM

## 2021-02-18 PROCEDURE — 99232 SBSQ HOSP IP/OBS MODERATE 35: CPT | Mod: 25 | Performed by: NURSE PRACTITIONER

## 2021-02-18 PROCEDURE — 250N000013 HC RX MED GY IP 250 OP 250 PS 637: Performed by: STUDENT IN AN ORGANIZED HEALTH CARE EDUCATION/TRAINING PROGRAM

## 2021-02-18 PROCEDURE — 360N000075 HC SURGERY LEVEL 2, PER MIN: Performed by: PLASTIC SURGERY

## 2021-02-18 PROCEDURE — 85610 PROTHROMBIN TIME: CPT | Performed by: STUDENT IN AN ORGANIZED HEALTH CARE EDUCATION/TRAINING PROGRAM

## 2021-02-18 PROCEDURE — 272N000001 HC OR GENERAL SUPPLY STERILE: Performed by: PLASTIC SURGERY

## 2021-02-18 PROCEDURE — 83735 ASSAY OF MAGNESIUM: CPT | Performed by: STUDENT IN AN ORGANIZED HEALTH CARE EDUCATION/TRAINING PROGRAM

## 2021-02-18 RX ORDER — WARFARIN SODIUM 10 MG/1
10 TABLET ORAL
Status: COMPLETED | OUTPATIENT
Start: 2021-02-18 | End: 2021-02-18

## 2021-02-18 RX ORDER — POTASSIUM CHLORIDE 750 MG/1
30 TABLET, EXTENDED RELEASE ORAL ONCE
Status: DISCONTINUED | OUTPATIENT
Start: 2021-02-18 | End: 2021-02-26

## 2021-02-18 RX ORDER — HYDROMORPHONE HYDROCHLORIDE 2 MG/1
2-4 TABLET ORAL EVERY 4 HOURS PRN
Status: DISCONTINUED | OUTPATIENT
Start: 2021-02-18 | End: 2021-02-19

## 2021-02-18 RX ORDER — LIDOCAINE HYDROCHLORIDE AND EPINEPHRINE 10; 10 MG/ML; UG/ML
INJECTION, SOLUTION INFILTRATION; PERINEURAL PRN
Status: DISCONTINUED | OUTPATIENT
Start: 2021-02-18 | End: 2021-02-18 | Stop reason: HOSPADM

## 2021-02-18 RX ADMIN — GUAIFENESIN 600 MG: 600 TABLET ORAL at 08:42

## 2021-02-18 RX ADMIN — ALLOPURINOL 300 MG: 300 TABLET ORAL at 08:40

## 2021-02-18 RX ADMIN — UMECLIDINIUM 1 PUFF: 62.5 AEROSOL, POWDER ORAL at 08:50

## 2021-02-18 RX ADMIN — BUPROPION HYDROCHLORIDE 150 MG: 150 TABLET, EXTENDED RELEASE ORAL at 08:41

## 2021-02-18 RX ADMIN — ACETAMINOPHEN 650 MG: 325 TABLET, FILM COATED ORAL at 08:40

## 2021-02-18 RX ADMIN — ACETAMINOPHEN 650 MG: 325 TABLET, FILM COATED ORAL at 16:07

## 2021-02-18 RX ADMIN — MONTELUKAST 10 MG: 10 TABLET, FILM COATED ORAL at 21:53

## 2021-02-18 RX ADMIN — AMIODARONE HYDROCHLORIDE 200 MG: 200 TABLET ORAL at 08:40

## 2021-02-18 RX ADMIN — ISOSORBIDE DINITRATE 10 MG: 10 TABLET ORAL at 20:12

## 2021-02-18 RX ADMIN — GUAIFENESIN 600 MG: 600 TABLET ORAL at 20:13

## 2021-02-18 RX ADMIN — ALBUTEROL SULFATE 2 PUFF: 90 AEROSOL, METERED RESPIRATORY (INHALATION) at 08:53

## 2021-02-18 RX ADMIN — OXYCODONE HYDROCHLORIDE 5 MG: 5 TABLET ORAL at 20:12

## 2021-02-18 RX ADMIN — DOCUSATE SODIUM 50 MG AND SENNOSIDES 8.6 MG 1 TABLET: 8.6; 5 TABLET, FILM COATED ORAL at 20:13

## 2021-02-18 RX ADMIN — Medication 10 MG: at 21:53

## 2021-02-18 RX ADMIN — HYDRALAZINE HYDROCHLORIDE 75 MG: 25 TABLET, FILM COATED ORAL at 20:13

## 2021-02-18 RX ADMIN — FLUTICASONE FUROATE AND VILANTEROL TRIFENATATE 1 PUFF: 100; 25 POWDER RESPIRATORY (INHALATION) at 08:49

## 2021-02-18 RX ADMIN — OXYCODONE HYDROCHLORIDE 5 MG: 5 TABLET ORAL at 16:06

## 2021-02-18 RX ADMIN — ACETAMINOPHEN 650 MG: 325 TABLET, FILM COATED ORAL at 20:12

## 2021-02-18 RX ADMIN — HYDRALAZINE HYDROCHLORIDE 75 MG: 25 TABLET, FILM COATED ORAL at 08:42

## 2021-02-18 RX ADMIN — BUMETANIDE 4 MG: 2 TABLET ORAL at 16:07

## 2021-02-18 RX ADMIN — GABAPENTIN 300 MG: 300 CAPSULE ORAL at 08:42

## 2021-02-18 RX ADMIN — HYDROMORPHONE HYDROCHLORIDE 2 MG: 2 TABLET ORAL at 23:23

## 2021-02-18 RX ADMIN — GABAPENTIN 600 MG: 300 CAPSULE ORAL at 21:54

## 2021-02-18 RX ADMIN — WARFARIN SODIUM 10 MG: 10 TABLET ORAL at 17:31

## 2021-02-18 RX ADMIN — ISOSORBIDE DINITRATE 10 MG: 10 TABLET ORAL at 08:42

## 2021-02-18 RX ADMIN — ASPIRIN 81 MG CHEWABLE TABLET 81 MG: 81 TABLET CHEWABLE at 08:40

## 2021-02-18 RX ADMIN — OXYCODONE HYDROCHLORIDE 5 MG: 5 TABLET ORAL at 01:35

## 2021-02-18 ASSESSMENT — ACTIVITIES OF DAILY LIVING (ADL)
ADLS_ACUITY_SCORE: 12

## 2021-02-18 ASSESSMENT — MIFFLIN-ST. JEOR: SCORE: 1747.91

## 2021-02-18 NOTE — PLAN OF CARE
D- decompensated HF, status 2E for heart transplant  I/A- SR/ST with first degree AVB and BBB. VSS. RA. HM2 numbers WNL. Scheduled tylenol for pain. Pt steady on feet today. PT/OT. Carpal tunnel surgery done today, bilateral wrist dressings CDI. Pt denies pain.   P- Monitoring.

## 2021-02-18 NOTE — OR NURSING
LVAD coordinator Abbie notified of pt's procedure today in OR. Per RN, they will not accompany pt to OR. OR RN and Surgeon to care for pt.   RN also made aware of Flow rate 7.4- stated she will let Rajani pt's VAD coordinator know.     Back up controller, batteries and wall monitor at bedside.

## 2021-02-18 NOTE — PLAN OF CARE
PT 6C: Cancel, pt off the unit for carpal tunnel surgery today, PT will reschedule for tomorrow, 2/19

## 2021-02-18 NOTE — OP NOTE
DATE OF OPERATION: February 18, 2021    PREOPERATIVE DIAGNOSIS: Bilateral carpal tunnel syndrome.    POSTOPERATIVE DIAGNOSIS: Bilateral carpal tunnel syndrome.    PROCEDURES PERFORMED: Bilateral open transverse carpal ligament release.    SURGEON: Jermaine Brand MD    ASSISTANT: CK Win (This assistant was required because there was inconsistent and inadequate resident assistance available to perform wound closure and apply sterile dressings.)    ANESTHESIA: Local only.  10 cc of 1% lidocaine with 1-100,000 epinephrine on each side for a total of 20 cc.    ESTIMATED BLOOD LOSS: 5 mL    TOURNIQUET TIME: 0 min    FINDINGS: Injected appearing median nerve on the right side and flattened pale appearing median nerve on the left side.  Both carpal tunnels were tight distally.    SPECIMENS: None.    COMPLICATIONS: None.    DRAINS: None.    IMPLANTS: None.    INDICATIONS:  Patient is a 65-year-old male with longstanding history of bilateral carpal tunnel syndrome.  This was confirmed on nerve conduction studies performed in 2017 that showed bilateral severe median neuropathy at the wrist.  He underwent steroid injection and had significant improvement in symptoms on both sides.  Patient is hospitalized waiting for heart transplant.  Given that he had such severe symptoms, request was for carpal tunnel release during this hospitalization.  Risk benefits and alternatives were discussed for bilateral open transverse carpal ligament release under local only.  Patient accepted the associated risks and wished to proceed with surgery.    DESCRIPTION OF PROCEDURE:  Informed consent and markings were performed in the preoperative holding area.  After a timeout, the right volar wrist was injected with 10 cc of 1% lidocaine with 1 -100,000 epinephrine.  Patient was then taken to the operating room and placed in the supine position with his right hand out on a hand table.  The hand was then prepped and draped in a sterile  fashion.  Another timeout was performed.    Prior to operating on the right wrist, I injected the left volar wrist with another 10 cc of 1% lidocaine with 1-100,000 epinephrine.  We then began the operation on the right wrist.  A 2 cm incision was designed along a naturally occurring ulnar-sided thenar crease.  The local anesthetic was tested.  The incision was made with a 15 blade scalpel.  This was carried down through the subcutaneous tissue with bipolar cautery until the palmar fascia was visualized.  This was opened longitudinally with a 15 blade.  The transverse carpal ligament was visualized.  This was released at the side of the incision with a 15 blade until contents of the carpal tunnel were visualized.  The soft tissues above superficial to the transverse carpal ligament were bluntly dissected away proximally.  Under direct visualization, the proximal aspect of the transverse carpal ligament was completely divided until the antebrachial fascia was met while a mosquito was placed to protect the contents of the carpal tunnel.  Attention was then turned distally.  The distal aspect of the transverse carpal ligament was released until bulging fat was met.  It was noted that the distal aspect of the carpal tunnel was very tight.  The right median nerve appeared injected and inflamed.  The wound was thoroughly irrigated.  The skin was then closed with interrupted 4-0 Prolene mattress sutures.  Patient was unable to give me an okay sign with the thumb and index finger as well as thumb and small finger.    We then prepped and draped for the left side.  The same operation was then performed on the left side.  The median nerve there appeared flattened and pale after release of the transverse carpal ligament.  The distal aspect of the carpal tunnel was the tightest.  Again, patient was able to give me an okay sign with the thumb and index finger as well as the thumb and small finger following release of the  transverse carpal ligament.  Ace bandage compression dressings were applied.  Counts were correct at the end of the case.  Patient was then taken to the postoperative area in stable condition.    DISPOSITION: Inpatient floor.

## 2021-02-18 NOTE — PLAN OF CARE
Temp: 98  F (36.7  C) Temp src: Oral BP: 108/70 Pulse: 84   Resp: 18 SpO2: 98 % O2 Device: BiPAP/CPAP       D: Admitted with worsening HF, now status 2E awaiting transplant. Hx NICM s/p HM2 (2017), afib, RV failure, CKD, DM, CECILIA, HTN, asthma     I/A: [place] (he/him) is A&O x4. Tele in place, SR. VSS on RA, CPAP HS. PIV x2 in place, both SL. VAD numbers WDL. Oxycodone given x1 for gout-related pain. Up with SBA. Slept well overnight     P: Continue to monitor and follow POC. Plan for carpal tunnel release in am; OR time 1140. Notify Cards 2 with changes

## 2021-02-18 NOTE — PROGRESS NOTES
[facility]   Cardiology II Service / Advanced Heart Failure  Daily Progress Note  Date of Service: 2/18/2021      Patient: Jim Willingham  MRN: [id number]  Admission Date: 2/8/2021  Hospital Day # 10    Assessment and Plan: Jim Willingham is a 65 year old male with history of NICM EF 20% c/b VT s/p CRT-D s/p HMII LVAD 6/19/2017 originally intended as destination therapy 2/2 obesity however with recent weight loss now candidate for transplantation. He is admitted for worsening functional status and renal function concerning for worsening heart failure. He is now listed status 2E for transplant, extension due 2/22/21.     Chronic systolic heart failure secondary to NICM s/p HM II LVAD. Echo 1/8/21 at 9400rpm, EF<30%m LVIDd 6.8cm, at least mildly reduced RV function, AoV closed without AI, mild-mod eccentric MR, dilated IVC without collapse.   Stage D, NYHA Class IIIB  ACEi/ARB contraindicated due to renal dysfunction. Hydralazine 75 mg po TID. Isordil 10 mg po TID.   BB contraindicated due to low cardiac output  Aldosterone antagonist contraindicated due to renal dysfunction  SCD prophylaxis CRT-D  Fluid status Euvolemic. Bumex 4 mg po BID. Hold AM dose in prep for surgery today, reassess volume status this afternoon.   MAP: 87.  LDH: 303   Anticoagulation: Coumadin per pharmacy. INR- 1.93, goal 2-3.   Antiplatelet: ASA 81 mg po daily  - remains on the cardiac transplant list status 2E.     The patient's HeartMate LVAD was interrogated 2/18/2021  * Speed 9600 rpm   * Pulsatility index 4.9   * Power 6.4 Manuel   * Flow 5.5 L/minute   Fluid status: euvolemic   Alarms were reviewed, and notable for frequent PI events with lowest being 2.6.   The driveline exit site was covered with dressing clean, dry, and intact.   All external components were inspected and showed no evidence of damage or malfunction.    Non-traumatic Fall.   - Wean oxycodone as able.   - Appreciate PT/OT consult.      Carpel tunnel,  bilateral. Evidence of thenar atrophy. Relief with steroid injection 11/20.   - Appreciate Ortho consult. Case discussed with Ortho.   - plan for surgery today.   - Wean oxycodone as able.   - Schedule Tylenol 650 mg po QID.   - Gabapentin 300 mg in AM, 300 mg in the afternoon, and 600 mg in the evening.      WALTER on CKD Stage III. Cr peaked at 2.22  - Cr-1.77 (1.69).      History of Afib. History of VT/VF. CHADSVASC-5.   - Continue Amiodarone 200 mg po daily.   - Coumadin as above.      Acute Gout.   - Minimal improvement with Colchicine times one.   - Appreciate Rheumatology consult.   - Plan for Anakinra injections to start today.      Rhinovirus, resolved. Bacterial bronchitis. Completed 5 day course of Cefdinir. COVID negative 2/8. Resp PCR +rhinovirus. CXR 2/12 with no overt PNA. Stopped cefdinir 2/15.   - ID recommends ideally defer transplant until 2/19.   - repeat RVP-3/5 per transplant ID     Chronic/resolved conditions  DM type II, controlled: Hgb A1C-6.2. Glargine stopped per patient request on 2/14/21, medium dose Novolog sliding scale insulin.  COPD/Asthma: pta Breo Ellipta, albuterol prn.   Depression: pta Bupropion  Right sided weakness, resolved. CT head negative for acute findings. Appreciate Neuro eval.   Staph Hominis Bacteremia. No need for surveillance blood cx per transplant ID.       FEN: 2 gram sodium diet   PROPHY:  Coumadin  LINES:  PIV   DISPO:  TBD pending cardiac transplant.   CODE STATUS: Full Code   ================================================================  Interval History/ROS: He complains of bilateral hand pain. Right ankle pain improved this AM. He denies fever, chills, chest pain, palpitations, cough, nausea, vomiting, diarrhea, hematochezia, hematemesis, concerns at his LVAD drive line site, and LE edema. He hasn't eaten anything this AM and is ambulating in the ayers with therapy.     Last 24 hr care team notes reviewed.   ROS:  4 point ROS including Respiratory, CV, GI  "and , other than that noted in the HPI, is negative.     Medications: Reviewed in EPIC.     Physical Exam:   BP 98/76 (BP Location: Left arm)   Pulse 67   Temp 97.9  F (36.6  C) (Oral)   Resp 18   Ht 1.727 m (5' 8\")   Wt 97.8 kg (215 lb 11.2 oz)   SpO2 96%   BMI 32.80 kg/m    GENERAL: Appears alert and oriented times three.   HEENT: Eye symmetrical and free of discharge bilaterally. Mucous membranes moist and without lesions.  NECK: Supple and without lymphadenopathy. JVD below clavicular line.    CV: RRR, S1S2 present with LVAD hum.   RESPIRATORY: Respirations regular, even, and unlabored. Lungs CTA throughout.   GI: Soft and non distended with normoactive bowel sounds present in all quadrants. No tenderness, rebound, guarding. No organomegaly.   EXTREMITIES: + 1 bilateral LE peripheral edema. 2+ bilateral pedal pulses.   NEUROLOGIC: Alert and orientated x 3. CN II-XII grossly intact. No focal deficits.   MUSCULOSKELETAL: Edema to right hand.    SKIN: No jaundice. No rashes or lesions. LVAD drive line covered.     Data:  CMP  Recent Labs   Lab 02/18/21  0630 02/17/21  0527 02/16/21  0859 02/16/21  0532 02/15/21  0553 02/13/21  0608 02/13/21  0608    135  --  132* 134   < > 132*   POTASSIUM 3.7 4.0  --  4.2 4.0   < > 4.2   CHLORIDE 101 101  --  97 99   < > 98   CO2 31 30  --  31 28   < > 28   ANIONGAP 6 5  --  3 7   < > 6   GLC 94 112*  --  116* 102*   < > 94   BUN 44* 37*  --  36* 39*   < > 37*   CR 1.77* 1.69*  --  1.63* 1.65*   < > 1.75*   GFRESTIMATED 40* 42*  --  44* 43*   < > 40*   GFRESTBLACK 46* 49*  --  51* 50*   < > 47*   QAMAR 9.0 8.7  --  8.8 9.0   < > 8.8   MAG 2.6* 2.5*  --  2.4* 2.4*   < > 2.4*   PROTTOTAL  --   --  6.8 6.6*  --   --  6.5*   ALBUMIN  --   --  3.3* 3.2*  --   --  3.2*   BILITOTAL  --   --  0.7 0.6  --   --  0.6   ALKPHOS  --   --  104 99  --   --  97   AST  --   --  38 34  --   --  28   ALT  --   --  39 39  --   --  40    < > = values in this interval not displayed. "     CBC  Recent Labs   Lab 02/17/21  0527 02/15/21  0553 02/14/21  0604 02/13/21  0608   WBC 6.5 5.9 8.7 12.1*   RBC 3.53* 3.86* 4.07* 3.71*   HGB 9.9* 10.7* 10.8* 9.9*   HCT 31.8* 35.0* 36.8* 32.7*   MCV 90 91 90 88   MCH 28.0 27.7 26.5 26.7   MCHC 31.1* 30.6* 29.3* 30.3*   RDW 19.6* 19.4* 19.5* 19.2*    238 236 234     INR  Recent Labs   Lab 02/18/21  0630 02/17/21  0527 02/16/21  0532 02/15/21  0553   INR 1.93* 2.25* 2.50* 2.27*       Patient discussed with Dr. Seth.      Soraya Marshall FN  2/18/2021

## 2021-02-18 NOTE — PLAN OF CARE
D: Status 2E. LVAD  I: Monitored vitals and assessed pt status. Scheduled tylenol for pain.     A: A0x4. VSS. Room air. VAD #s WDL. Patient endorses gout and carpal tunnel pain. Scheduled tylenol given. Patient pleasant and cooperative with cares. Able to make needs known.     I/O this shift:  In: 480 [P.O.:480]  Out: 1440 [Urine:1440]    Temp:  [98  F (36.7  C)-98.6  F (37  C)] 98  F (36.7  C)  Pulse:  [68-78] 78  Resp:  [16-18] 18  BP: ()/(59-77) 108/70  SpO2:  [94 %-98 %] 98 %      P: Continue to monitor Pt status and report changes to treatment team. Carpal tunnel surgery in the morning.

## 2021-02-18 NOTE — BRIEF OP NOTE
[facility]    Brief Operative Note    Pre-operative diagnosis: Bilateral carpal tunnel syndrome [G56.03]  Post-operative diagnosis Same as pre-operative diagnosis    Procedure: Procedure(s):  Bilateral carpal tunnel release  Surgeon: Surgeon(s) and Role:     * Jermaine Brand MD - Primary     * Maryellen Banuelos PA-C - Assisting  Anesthesia: Local   Estimated blood loss: Minimal  Drains: None  Specimens: * No specimens in log *  Findings:   None.  Complications: None.  Implants: * No implants in log *    PLAN:  Compression ace wrap for 24 hr  Dressing in place 2-3 days  Sutures out in 10-14 days

## 2021-02-19 ENCOUNTER — APPOINTMENT (OUTPATIENT)
Dept: OCCUPATIONAL THERAPY | Facility: CLINIC | Age: 66
DRG: 001 | End: 2021-02-19
Attending: INTERNAL MEDICINE
Payer: COMMERCIAL

## 2021-02-19 LAB
ANION GAP SERPL CALCULATED.3IONS-SCNC: 9 MMOL/L (ref 3–14)
BUN SERPL-MCNC: 42 MG/DL (ref 7–30)
CALCIUM SERPL-MCNC: 8.8 MG/DL (ref 8.5–10.1)
CHLORIDE SERPL-SCNC: 100 MMOL/L (ref 94–109)
CO2 SERPL-SCNC: 26 MMOL/L (ref 20–32)
CREAT SERPL-MCNC: 1.58 MG/DL (ref 0.66–1.25)
ERYTHROCYTE [DISTWIDTH] IN BLOOD BY AUTOMATED COUNT: 19.5 % (ref 10–15)
GFR SERPL CREATININE-BSD FRML MDRD: 46 ML/MIN/{1.73_M2}
GLUCOSE BLDC GLUCOMTR-MCNC: 120 MG/DL (ref 70–99)
GLUCOSE BLDC GLUCOMTR-MCNC: 126 MG/DL (ref 70–99)
GLUCOSE BLDC GLUCOMTR-MCNC: 266 MG/DL (ref 70–99)
GLUCOSE SERPL-MCNC: 108 MG/DL (ref 70–99)
HCT VFR BLD AUTO: 34.3 % (ref 40–53)
HGB BLD-MCNC: 10.7 G/DL (ref 13.3–17.7)
INR PPP: 2.01 (ref 0.86–1.14)
MAGNESIUM SERPL-MCNC: 2.4 MG/DL (ref 1.6–2.3)
MCH RBC QN AUTO: 28.1 PG (ref 26.5–33)
MCHC RBC AUTO-ENTMCNC: 31.2 G/DL (ref 31.5–36.5)
MCV RBC AUTO: 90 FL (ref 78–100)
PLATELET # BLD AUTO: 251 10E9/L (ref 150–450)
POTASSIUM SERPL-SCNC: 3.6 MMOL/L (ref 3.4–5.3)
RBC # BLD AUTO: 3.81 10E12/L (ref 4.4–5.9)
SODIUM SERPL-SCNC: 134 MMOL/L (ref 133–144)
WBC # BLD AUTO: 7 10E9/L (ref 4–11)

## 2021-02-19 PROCEDURE — 80048 BASIC METABOLIC PNL TOTAL CA: CPT | Performed by: STUDENT IN AN ORGANIZED HEALTH CARE EDUCATION/TRAINING PROGRAM

## 2021-02-19 PROCEDURE — 214N000001 HC R&B CCU UMMC

## 2021-02-19 PROCEDURE — 85027 COMPLETE CBC AUTOMATED: CPT | Performed by: STUDENT IN AN ORGANIZED HEALTH CARE EDUCATION/TRAINING PROGRAM

## 2021-02-19 PROCEDURE — 250N000013 HC RX MED GY IP 250 OP 250 PS 637: Performed by: NURSE PRACTITIONER

## 2021-02-19 PROCEDURE — 250N000013 HC RX MED GY IP 250 OP 250 PS 637: Performed by: STUDENT IN AN ORGANIZED HEALTH CARE EDUCATION/TRAINING PROGRAM

## 2021-02-19 PROCEDURE — 36415 COLL VENOUS BLD VENIPUNCTURE: CPT | Performed by: STUDENT IN AN ORGANIZED HEALTH CARE EDUCATION/TRAINING PROGRAM

## 2021-02-19 PROCEDURE — 999N001017 HC STATISTIC GLUCOSE BY METER IP

## 2021-02-19 PROCEDURE — 250N000013 HC RX MED GY IP 250 OP 250 PS 637: Performed by: INTERNAL MEDICINE

## 2021-02-19 PROCEDURE — 250N000009 HC RX 250: Performed by: INTERNAL MEDICINE

## 2021-02-19 PROCEDURE — 85610 PROTHROMBIN TIME: CPT | Performed by: STUDENT IN AN ORGANIZED HEALTH CARE EDUCATION/TRAINING PROGRAM

## 2021-02-19 PROCEDURE — 83735 ASSAY OF MAGNESIUM: CPT | Performed by: STUDENT IN AN ORGANIZED HEALTH CARE EDUCATION/TRAINING PROGRAM

## 2021-02-19 PROCEDURE — G0463 HOSPITAL OUTPT CLINIC VISIT: HCPCS

## 2021-02-19 PROCEDURE — 97530 THERAPEUTIC ACTIVITIES: CPT | Mod: GO

## 2021-02-19 PROCEDURE — 93750 INTERROGATION VAD IN PERSON: CPT | Performed by: NURSE PRACTITIONER

## 2021-02-19 PROCEDURE — 99232 SBSQ HOSP IP/OBS MODERATE 35: CPT | Mod: 25 | Performed by: NURSE PRACTITIONER

## 2021-02-19 RX ORDER — WARFARIN SODIUM 7.5 MG/1
7.5 TABLET ORAL
Status: COMPLETED | OUTPATIENT
Start: 2021-02-19 | End: 2021-02-19

## 2021-02-19 RX ORDER — HYDROMORPHONE HYDROCHLORIDE 2 MG/1
2-4 TABLET ORAL
Status: DISCONTINUED | OUTPATIENT
Start: 2021-02-19 | End: 2021-02-19

## 2021-02-19 RX ORDER — BUMETANIDE 2 MG/1
4 TABLET ORAL
Status: DISCONTINUED | OUTPATIENT
Start: 2021-02-19 | End: 2021-02-23

## 2021-02-19 RX ORDER — HYDROMORPHONE HYDROCHLORIDE 2 MG/1
2 TABLET ORAL
Status: DISCONTINUED | OUTPATIENT
Start: 2021-02-19 | End: 2021-02-22

## 2021-02-19 RX ORDER — METHOCARBAMOL 500 MG/1
500 TABLET, FILM COATED ORAL 3 TIMES DAILY PRN
Status: DISCONTINUED | OUTPATIENT
Start: 2021-02-19 | End: 2021-02-26

## 2021-02-19 RX ORDER — ACETAMINOPHEN 500 MG
1000 TABLET ORAL EVERY 6 HOURS PRN
Status: DISCONTINUED | OUTPATIENT
Start: 2021-02-19 | End: 2021-02-20

## 2021-02-19 RX ADMIN — ACETAMINOPHEN 500 MG: 500 TABLET, FILM COATED ORAL at 21:47

## 2021-02-19 RX ADMIN — ACETAMINOPHEN 500 MG: 500 TABLET, FILM COATED ORAL at 15:38

## 2021-02-19 RX ADMIN — GUAIFENESIN 600 MG: 600 TABLET ORAL at 08:52

## 2021-02-19 RX ADMIN — BUMETANIDE 4 MG: 2 TABLET ORAL at 15:31

## 2021-02-19 RX ADMIN — GUAIFENESIN 600 MG: 600 TABLET ORAL at 20:45

## 2021-02-19 RX ADMIN — AMIODARONE HYDROCHLORIDE 200 MG: 200 TABLET ORAL at 08:52

## 2021-02-19 RX ADMIN — GABAPENTIN 600 MG: 300 CAPSULE ORAL at 21:39

## 2021-02-19 RX ADMIN — ACETAMINOPHEN 1000 MG: 500 TABLET, FILM COATED ORAL at 09:49

## 2021-02-19 RX ADMIN — HYDRALAZINE HYDROCHLORIDE 75 MG: 25 TABLET, FILM COATED ORAL at 15:28

## 2021-02-19 RX ADMIN — UMECLIDINIUM 1 PUFF: 62.5 AEROSOL, POWDER ORAL at 09:02

## 2021-02-19 RX ADMIN — BUMETANIDE 4 MG: 2 TABLET ORAL at 08:52

## 2021-02-19 RX ADMIN — ASPIRIN 81 MG CHEWABLE TABLET 81 MG: 81 TABLET CHEWABLE at 08:51

## 2021-02-19 RX ADMIN — HYDROMORPHONE HYDROCHLORIDE 2 MG: 2 TABLET ORAL at 12:54

## 2021-02-19 RX ADMIN — METHOCARBAMOL 500 MG: 500 TABLET, FILM COATED ORAL at 15:37

## 2021-02-19 RX ADMIN — HYDROMORPHONE HYDROCHLORIDE 2 MG: 2 TABLET ORAL at 09:49

## 2021-02-19 RX ADMIN — BUPROPION HYDROCHLORIDE 150 MG: 150 TABLET, EXTENDED RELEASE ORAL at 08:50

## 2021-02-19 RX ADMIN — HYDROMORPHONE HYDROCHLORIDE 2 MG: 2 TABLET ORAL at 17:42

## 2021-02-19 RX ADMIN — HYDROMORPHONE HYDROCHLORIDE 2 MG: 2 TABLET ORAL at 20:43

## 2021-02-19 RX ADMIN — HYDROMORPHONE HYDROCHLORIDE 4 MG: 2 TABLET ORAL at 07:08

## 2021-02-19 RX ADMIN — GABAPENTIN 300 MG: 300 CAPSULE ORAL at 15:33

## 2021-02-19 RX ADMIN — HYDRALAZINE HYDROCHLORIDE 75 MG: 25 TABLET, FILM COATED ORAL at 20:45

## 2021-02-19 RX ADMIN — ISOSORBIDE DINITRATE 10 MG: 10 TABLET ORAL at 08:52

## 2021-02-19 RX ADMIN — FLUTICASONE FUROATE AND VILANTEROL TRIFENATATE 1 PUFF: 100; 25 POWDER RESPIRATORY (INHALATION) at 09:02

## 2021-02-19 RX ADMIN — METHOCARBAMOL 500 MG: 500 TABLET, FILM COATED ORAL at 09:49

## 2021-02-19 RX ADMIN — HYDRALAZINE HYDROCHLORIDE 75 MG: 25 TABLET, FILM COATED ORAL at 08:51

## 2021-02-19 RX ADMIN — ISOSORBIDE DINITRATE 10 MG: 10 TABLET ORAL at 20:45

## 2021-02-19 RX ADMIN — Medication 10 MG: at 21:39

## 2021-02-19 RX ADMIN — ACETAMINOPHEN 1000 MG: 500 TABLET, FILM COATED ORAL at 03:22

## 2021-02-19 RX ADMIN — GABAPENTIN 300 MG: 300 CAPSULE ORAL at 08:51

## 2021-02-19 RX ADMIN — HYDROMORPHONE HYDROCHLORIDE 4 MG: 2 TABLET ORAL at 04:13

## 2021-02-19 RX ADMIN — WARFARIN SODIUM 7.5 MG: 7.5 TABLET ORAL at 17:42

## 2021-02-19 RX ADMIN — ALLOPURINOL 300 MG: 300 TABLET ORAL at 08:52

## 2021-02-19 RX ADMIN — ISOSORBIDE DINITRATE 10 MG: 10 TABLET ORAL at 15:32

## 2021-02-19 RX ADMIN — MONTELUKAST 10 MG: 10 TABLET, FILM COATED ORAL at 21:39

## 2021-02-19 RX ADMIN — HYDROMORPHONE HYDROCHLORIDE 2 MG: 2 TABLET ORAL at 01:14

## 2021-02-19 RX ADMIN — POTASSIUM CHLORIDE 60 MEQ: 750 TABLET, EXTENDED RELEASE ORAL at 08:52

## 2021-02-19 RX ADMIN — ACETAMINOPHEN 650 MG: 325 TABLET, FILM COATED ORAL at 01:18

## 2021-02-19 RX ADMIN — ANAKINRA 100 MG: 100 INJECTION, SOLUTION SUBCUTANEOUS at 20:45

## 2021-02-19 ASSESSMENT — ACTIVITIES OF DAILY LIVING (ADL)
ADLS_ACUITY_SCORE: 12

## 2021-02-19 ASSESSMENT — MIFFLIN-ST. JEOR: SCORE: 1761.06

## 2021-02-19 NOTE — CONSULTS
[facility] Nurse Inpatient Wound Assessment   Reason for consultation: Evaluate and treat  Left knee wounds    Assessment  Left knee wounds due to Trauma  Status: initial assessment  Partial thickness, concern for bleeding 2/2 anticoagulation therapy   Treatment Plan  Left knee wounds: Every other day :  Cleanse with microklenz spray and gauze.  Pat dry   Cover with calcium alginate (gelling only) ([id number])  Cover with primapore dressing.       Orders Written  WOC Nurse follow-up plan:weekly  Nursing to notify the Provider(s) and re-consult the WO Nurse if wound(s) deteriorates or new skin concern.    Patient History  According to provider note(s):  Admitted 2/8 with decompensated heart failure. pmh significant for NICM EF 20% c/b VT s/p CRT-D s/p HMII LVAD 6/19/2017 originally intended as destination therapy 2/2 obesity, recent weight loss, atrial fibrillation s/p DCCV on amiodarone and warfarin, history of staph hominis bacteremia, CKD stage 3, COPD who presents for worsening dyspnea on exertion, lethargy.      Objective Data  Active Diet Order  Orders Placed This Encounter      2 Gram Sodium Diet      Output:   I/O last 3 completed shifts:  In: 1090 [P.O.:1090]  Out: 1350 [Urine:1350]    Risk Assessment:   Sensory Perception: 3-->slightly limited  Moisture: 4-->rarely moist  Activity: 3-->walks occasionally  Mobility: 4-->no limitation  Nutrition: 3-->adequate  Friction and Shear: 3-->no apparent problem  [place] Score: 20                          Labs:   Recent Labs   Lab 02/19/21  0545 02/16/21  0859 02/16/21  0859   ALBUMIN  --   --  3.3*   HGB 10.7*   < >  --    INR 2.01*   < >  --    WBC 7.0   < >  --     < > = values in this interval not displayed.       Physical Exam  Wound Location:  Left anterior knee        Date of last photo 2/19/21  Wound History: pt doesn't know how he scrapped the knee about 2 days ago     Wound Base: 100 % dermis     Palpation of the wound bed: normal      Drainage: constant sanguinous  oozing     Measurements (length x width x depth, in cm) 0.7  x 1.4  x  0.1 cm      Tunneling N/A     Undermining N/A  Periwound skin: intact      Color: normal and consistent with surrounding tissue      Temperature: normal   Odor: none  Pain: absent,     Interventions  Visual inspection and assessment completed   Wound Care Rationale Provide protection  and promote clotting  Wound Care: completed by RN  Supplies: ordered: calcium alginate  Current support surface: Standard  Atmos Air mattress  Education provided to: plan of care and wound progress  Discussed plan of care with Patient and RN

## 2021-02-19 NOTE — PROGRESS NOTES
D: stopped in patient room for routine VAD Coordinator rounding (r/t COVID-19). No VAD related questions or concerns at this time. Patient's main focus is pain from carpal tunnel surgery.  I: Discussed POC and provided support and listened to patient and care giver's thoughts and concerns.  P: Continue to follow patient and address any questions or concerns patient and or caregiver may have.

## 2021-02-19 NOTE — PLAN OF CARE
Temp: 97.3  F (36.3  C) Temp src: Oral BP: (!) 104/90 Pulse: 68   Resp: 16 SpO2: 98 % O2 Device: None (Room air)       D: Admitted with worsening HF, now status 2E awaiting transplant. Hx NICM s/p HM2 (2017), afib, RV failure, CKD, DM, CECILIA, HTN, asthma     I/A: [place] (he/him) is A&O x4. Tele in place, SR. VSS on RA, CPAP HS. PIV x2 in place, both SL. VAD numbers WDL. Had bilateral carpal tunnel release 2/18, Dilaudid and Tylenol given for related pain. Having difficulty using hands d/t pain this shift. Up with SBA. Slept poorly overnight     P: Continue to monitor and follow POC. Notify Cards 2 with changes

## 2021-02-19 NOTE — PROGRESS NOTES
[facility]   Cardiology II Service / Advanced Heart Failure  Daily Progress Note  Date of Service: 2/19/2021      Patient: Jim Willingham  MRN: [id number]  Admission Date: 2/8/2021  Hospital Day # 11    Assessment and Plan: Jim Willingham is a 65 year old male with history of NICM EF 20% c/b VT s/p CRT-D s/p HMII LVAD 6/19/2017 originally intended as destination therapy 2/2 obesity however with recent weight loss now candidate for transplantation. He is admitted for worsening functional status and renal function concerning for worsening heart failure. He is now listed status 2E for transplant, extension due 2/22/21.     Chronic systolic heart failure secondary to NICM s/p HM II LVAD. Echo 1/8/21 at 9400rpm, EF<30%m LVIDd 6.8cm, at least mildly reduced RV function, AoV closed without AI, mild-mod eccentric MR, dilated IVC without collapse.   Stage D, NYHA Class IIIB  ACEi/ARB contraindicated due to renal dysfunction. Hydralazine 75 mg po TID. Isordil 10 mg po TID.   BB contraindicated due to low cardiac output  Aldosterone antagonist contraindicated due to renal dysfunction  SCD prophylaxis CRT-D  Fluid status Euvolemic. Bumex 4 mg po BID.   MAP: 67-97.  LDH: 303 2/16  Anticoagulation: Coumadin per pharmacy. INR- 2.01, goal 2-3.   Antiplatelet: ASA 81 mg po daily  - remains on the cardiac transplant list status 2E.      The patient's HeartMate LVAD was interrogated 2/19/2021  * Speed 9600 rpm   * Pulsatility index 4.9   * Power 6.4 Manuel   * Flow 5.7 L/minute   Fluid status: Euvolemic   Alarms were reviewed, and notable for PI events.  The driveline exit site was covered with dressing clean, dry, and intact.   All external components were inspected and showed no evidence of damage or malfunction.     Non-traumatic Fall.   - Wean oxycodone as able.   - Appreciate PT/OT consult.      Carpel tunnel, bilateral s/p bilateral release. Evidence of thenar atrophy. Relief with steroid injection 11/20. Carpal  tunnel release 2/18/21.  - Appreciate Ortho/Plastics consult.  - Robaxin 500 mg po TID prn.   - Ice prn.   - Changed to Dilaudid overnight, decrease dose to 2 mg q3h prn.   - Scheduled Tylenol 650 mg po QID.   - Gabapentin 300 mg in AM, 300 mg in the afternoon, and 600 mg in the evening.      WALTER on CKD Stage III. Cr peaked at 2.22  - Cr-1.58 (1.77).      History of Afib. History of VT/VF. CHADSVASC-5.   - Continue Amiodarone 200 mg po daily.   - Coumadin as above.      Acute Gout. Minimal improvement with Colchicine times one.   - Appreciate Rheumatology consult.   - Anakinra 100 mg subcutaneous daily for 3 days.      Rhinovirus, resolved. Bacterial bronchitis. Completed 5 day course of Cefdinir. COVID negative 2/8. Resp PCR +rhinovirus. CXR 2/12 with no overt PNA. Stopped cefdinir 2/15. Cleared for transplant per ID.   - repeat RVP-3/5 per transplant ID     Chronic/resolved conditions  DM type II, controlled: Hgb A1C-6.2. Glargine stopped per patient request on 2/14/21, medium dose Novolog sliding scale insulin.  COPD/Asthma: pta Breo Ellipta, albuterol prn.   Depression: pta Bupropion  Right sided weakness, resolved. CT head negative for acute findings. Appreciate Neuro eval.   Staph Hominis Bacteremia. No need for surveillance blood cx per transplant ID.       FEN: 2 gram sodium diet   PROPHY:  Coumadin  LINES:  PIV   DISPO:  TBD pending cardiac transplant.   CODE STATUS: Full Code   ================================================================    Interval History/ROS: He complains of severe bilateral wrist pain at incision site. He denies fever, chills, dizziness, chest pain, palpitations, cough, RESENDIZ, nausea, vomiting, diarrhea, melena, hematochezia, and LE edema. He is tolerating oral intake. He will continue to work with therapy pending adequate pain control.     Last 24 hr care team notes reviewed.   ROS:  4 point ROS including Respiratory, CV, GI and , other than that noted in the HPI, is negative.  "    Medications: Reviewed in EPIC.     Physical Exam:   BP (!) 89/69   Pulse 70   Temp 97.8  F (36.6  C) (Oral)   Resp 16   Ht 1.727 m (5' 8\")   Wt 99.2 kg (218 lb 9.6 oz)   SpO2 100%   BMI 33.24 kg/m    GENERAL: Appears alert and oriented times three.   HEENT: Eye symmetrical and free of discharge bilaterally. Mucous membranes moist and without lesions.  NECK: Supple and without lymphadenopathy. JVD 8 cm.   CV: RRR, S1S2 present with LVAD hum.   RESPIRATORY: Respirations regular, even, and unlabored. Lungs CTA throughout.   GI: Soft and non distended with normoactive bowel sounds present in all quadrants. No tenderness, rebound, guarding. No organomegaly.   EXTREMITIES: Trace bilateral LE peripheral edema. 2+ bilateral pedal pulses.   NEUROLOGIC: Alert and orientated x 3. CN II-XII grossly intact. No focal deficits.   MUSCULOSKELETAL: No joint swelling or tenderness.   SKIN: No jaundice. No rashes or lesions. LVAD drive line covered.     Data:  CMP  Recent Labs   Lab 02/19/21  0545 02/18/21  0630 02/17/21  0527 02/16/21  0859 02/16/21  0532 02/13/21  0608 02/13/21  0608    138 135  --  132*   < > 132*   POTASSIUM 3.6 3.7 4.0  --  4.2   < > 4.2   CHLORIDE 100 101 101  --  97   < > 98   CO2 26 31 30  --  31   < > 28   ANIONGAP 9 6 5  --  3   < > 6   * 94 112*  --  116*   < > 94   BUN 42* 44* 37*  --  36*   < > 37*   CR 1.58* 1.77* 1.69*  --  1.63*   < > 1.75*   GFRESTIMATED 46* 40* 42*  --  44*   < > 40*   GFRESTBLACK 53* 46* 49*  --  51*   < > 47*   QAMAR 8.8 9.0 8.7  --  8.8   < > 8.8   MAG 2.4* 2.6* 2.5*  --  2.4*   < > 2.4*   PROTTOTAL  --   --   --  6.8 6.6*  --  6.5*   ALBUMIN  --   --   --  3.3* 3.2*  --  3.2*   BILITOTAL  --   --   --  0.7 0.6  --  0.6   ALKPHOS  --   --   --  104 99  --  97   AST  --   --   --  38 34  --  28   ALT  --   --   --  39 39  --  40    < > = values in this interval not displayed.     CBC  Recent Labs   Lab 02/19/21  0545 02/17/21  0527 02/15/21  0553 " 02/14/21  0604   WBC 7.0 6.5 5.9 8.7   RBC 3.81* 3.53* 3.86* 4.07*   HGB 10.7* 9.9* 10.7* 10.8*   HCT 34.3* 31.8* 35.0* 36.8*   MCV 90 90 91 90   MCH 28.1 28.0 27.7 26.5   MCHC 31.2* 31.1* 30.6* 29.3*   RDW 19.5* 19.6* 19.4* 19.5*    212 238 236     INR  Recent Labs   Lab 02/19/21  0545 02/18/21  0630 02/17/21  0527 02/16/21  0532   INR 2.01* 1.93* 2.25* 2.50*       Patient discussed with Dr. Conte.      Soraya Marshall [facility]  2/19/2021

## 2021-02-19 NOTE — PROVIDER NOTIFICATION
D:javier wrists dry and intact with wraps, pt rates pain a 7 at 2000, pt concern about the effect of oxycodone  I:talked to pharmacy and Cross Cover  A:will try prn med as need,keep ace wraps on, continue with gentle movements  P:per Primary

## 2021-02-19 NOTE — PROGRESS NOTES
"Plastic Surgery Progress Note    S: No acute events overnight. Pain moderately controlled. Neuropathic pain over bilateral hands much improved after surgery, but having significant throbbing pain. Somewhat dissappointed about loss of ability to perform ADLs.    O:  BP (!) 104/90 (BP Location: Left arm)   Pulse 68   Temp 97.3  F (36.3  C) (Oral)   Resp 16   Ht 1.727 m (5' 8\")   Wt 99.2 kg (218 lb 9.6 oz)   SpO2 98%   BMI 33.24 kg/m    Gen: NAD, AOx3  Ext: Bilateral carpal tunnel release dressing intact. SILT in all digits. Able to oppose thumb bilaterally.     A/P:  66 yo POD #1 s/p bilateral carpal tunnel release. Convalescing appropriately.    - Elevate bilateral extremities (on pillows, extremity foam wedges, arm slings, etc.)  - Suture to remain in place for ~2 weeks  - No lifting > ~2lb per hand for 2 weeks    Emeka Arambula MD   Surgery PGY-5  Please Page On-Call on [employer]    "

## 2021-02-20 ENCOUNTER — APPOINTMENT (OUTPATIENT)
Dept: PHYSICAL THERAPY | Facility: CLINIC | Age: 66
DRG: 001 | End: 2021-02-20
Attending: INTERNAL MEDICINE
Payer: COMMERCIAL

## 2021-02-20 LAB
ANION GAP SERPL CALCULATED.3IONS-SCNC: 6 MMOL/L (ref 3–14)
BUN SERPL-MCNC: 33 MG/DL (ref 7–30)
CALCIUM SERPL-MCNC: 8.9 MG/DL (ref 8.5–10.1)
CHLORIDE SERPL-SCNC: 101 MMOL/L (ref 94–109)
CO2 SERPL-SCNC: 28 MMOL/L (ref 20–32)
CREAT SERPL-MCNC: 1.53 MG/DL (ref 0.66–1.25)
GFR SERPL CREATININE-BSD FRML MDRD: 47 ML/MIN/{1.73_M2}
GLUCOSE BLDC GLUCOMTR-MCNC: 109 MG/DL (ref 70–99)
GLUCOSE BLDC GLUCOMTR-MCNC: 114 MG/DL (ref 70–99)
GLUCOSE BLDC GLUCOMTR-MCNC: 120 MG/DL (ref 70–99)
GLUCOSE BLDC GLUCOMTR-MCNC: 140 MG/DL (ref 70–99)
GLUCOSE BLDC GLUCOMTR-MCNC: 145 MG/DL (ref 70–99)
GLUCOSE SERPL-MCNC: 117 MG/DL (ref 70–99)
INR PPP: 2.3 (ref 0.86–1.14)
LDH SERPL L TO P-CCNC: 298 U/L (ref 85–227)
MAGNESIUM SERPL-MCNC: 2.2 MG/DL (ref 1.6–2.3)
POTASSIUM SERPL-SCNC: 3.8 MMOL/L (ref 3.4–5.3)
SODIUM SERPL-SCNC: 135 MMOL/L (ref 133–144)

## 2021-02-20 PROCEDURE — 250N000013 HC RX MED GY IP 250 OP 250 PS 637: Performed by: INTERNAL MEDICINE

## 2021-02-20 PROCEDURE — 999N001017 HC STATISTIC GLUCOSE BY METER IP

## 2021-02-20 PROCEDURE — 80048 BASIC METABOLIC PNL TOTAL CA: CPT | Performed by: STUDENT IN AN ORGANIZED HEALTH CARE EDUCATION/TRAINING PROGRAM

## 2021-02-20 PROCEDURE — 85610 PROTHROMBIN TIME: CPT | Performed by: STUDENT IN AN ORGANIZED HEALTH CARE EDUCATION/TRAINING PROGRAM

## 2021-02-20 PROCEDURE — 250N000013 HC RX MED GY IP 250 OP 250 PS 637: Performed by: STUDENT IN AN ORGANIZED HEALTH CARE EDUCATION/TRAINING PROGRAM

## 2021-02-20 PROCEDURE — 93750 INTERROGATION VAD IN PERSON: CPT | Performed by: PHYSICIAN ASSISTANT

## 2021-02-20 PROCEDURE — 83615 LACTATE (LD) (LDH) ENZYME: CPT | Performed by: STUDENT IN AN ORGANIZED HEALTH CARE EDUCATION/TRAINING PROGRAM

## 2021-02-20 PROCEDURE — 97116 GAIT TRAINING THERAPY: CPT | Mod: GP | Performed by: PHYSICAL THERAPIST

## 2021-02-20 PROCEDURE — 250N000013 HC RX MED GY IP 250 OP 250 PS 637: Performed by: NURSE PRACTITIONER

## 2021-02-20 PROCEDURE — 250N000009 HC RX 250: Performed by: INTERNAL MEDICINE

## 2021-02-20 PROCEDURE — 214N000001 HC R&B CCU UMMC

## 2021-02-20 PROCEDURE — 83735 ASSAY OF MAGNESIUM: CPT | Performed by: STUDENT IN AN ORGANIZED HEALTH CARE EDUCATION/TRAINING PROGRAM

## 2021-02-20 PROCEDURE — 97530 THERAPEUTIC ACTIVITIES: CPT | Mod: GP | Performed by: PHYSICAL THERAPIST

## 2021-02-20 PROCEDURE — 36415 COLL VENOUS BLD VENIPUNCTURE: CPT | Performed by: STUDENT IN AN ORGANIZED HEALTH CARE EDUCATION/TRAINING PROGRAM

## 2021-02-20 PROCEDURE — 99232 SBSQ HOSP IP/OBS MODERATE 35: CPT | Mod: 25 | Performed by: PHYSICIAN ASSISTANT

## 2021-02-20 RX ORDER — ACETAMINOPHEN 325 MG/1
650 TABLET ORAL EVERY 4 HOURS PRN
Status: DISCONTINUED | OUTPATIENT
Start: 2021-02-20 | End: 2021-02-24

## 2021-02-20 RX ORDER — WARFARIN SODIUM 7.5 MG/1
7.5 TABLET ORAL
Status: COMPLETED | OUTPATIENT
Start: 2021-02-20 | End: 2021-02-20

## 2021-02-20 RX ADMIN — UMECLIDINIUM 1 PUFF: 62.5 AEROSOL, POWDER ORAL at 08:31

## 2021-02-20 RX ADMIN — ASPIRIN 81 MG CHEWABLE TABLET 81 MG: 81 TABLET CHEWABLE at 08:34

## 2021-02-20 RX ADMIN — ISOSORBIDE DINITRATE 10 MG: 10 TABLET ORAL at 14:04

## 2021-02-20 RX ADMIN — WARFARIN SODIUM 7.5 MG: 7.5 TABLET ORAL at 17:42

## 2021-02-20 RX ADMIN — BUMETANIDE 4 MG: 2 TABLET ORAL at 16:41

## 2021-02-20 RX ADMIN — HYDROMORPHONE HYDROCHLORIDE 2 MG: 2 TABLET ORAL at 17:47

## 2021-02-20 RX ADMIN — POTASSIUM CHLORIDE 60 MEQ: 750 TABLET, EXTENDED RELEASE ORAL at 08:36

## 2021-02-20 RX ADMIN — METHOCARBAMOL 500 MG: 500 TABLET, FILM COATED ORAL at 00:05

## 2021-02-20 RX ADMIN — HYDRALAZINE HYDROCHLORIDE 75 MG: 25 TABLET, FILM COATED ORAL at 14:04

## 2021-02-20 RX ADMIN — GUAIFENESIN 600 MG: 600 TABLET ORAL at 08:35

## 2021-02-20 RX ADMIN — ALBUTEROL SULFATE 2 PUFF: 90 AEROSOL, METERED RESPIRATORY (INHALATION) at 08:40

## 2021-02-20 RX ADMIN — HYDROMORPHONE HYDROCHLORIDE 2 MG: 2 TABLET ORAL at 14:04

## 2021-02-20 RX ADMIN — HYDROMORPHONE HYDROCHLORIDE 2 MG: 2 TABLET ORAL at 23:59

## 2021-02-20 RX ADMIN — HYDRALAZINE HYDROCHLORIDE 75 MG: 25 TABLET, FILM COATED ORAL at 08:34

## 2021-02-20 RX ADMIN — Medication 10 MG: at 21:05

## 2021-02-20 RX ADMIN — MONTELUKAST 10 MG: 10 TABLET, FILM COATED ORAL at 21:05

## 2021-02-20 RX ADMIN — GABAPENTIN 600 MG: 300 CAPSULE ORAL at 21:05

## 2021-02-20 RX ADMIN — HYDROMORPHONE HYDROCHLORIDE 2 MG: 2 TABLET ORAL at 00:05

## 2021-02-20 RX ADMIN — ISOSORBIDE DINITRATE 10 MG: 10 TABLET ORAL at 08:34

## 2021-02-20 RX ADMIN — METHOCARBAMOL 500 MG: 500 TABLET, FILM COATED ORAL at 08:33

## 2021-02-20 RX ADMIN — HYDRALAZINE HYDROCHLORIDE 75 MG: 25 TABLET, FILM COATED ORAL at 19:26

## 2021-02-20 RX ADMIN — ALLOPURINOL 300 MG: 300 TABLET ORAL at 08:34

## 2021-02-20 RX ADMIN — GABAPENTIN 300 MG: 300 CAPSULE ORAL at 08:35

## 2021-02-20 RX ADMIN — ACETAMINOPHEN 650 MG: 325 TABLET, FILM COATED ORAL at 10:26

## 2021-02-20 RX ADMIN — GABAPENTIN 300 MG: 300 CAPSULE ORAL at 14:04

## 2021-02-20 RX ADMIN — HYDROMORPHONE HYDROCHLORIDE 2 MG: 2 TABLET ORAL at 06:46

## 2021-02-20 RX ADMIN — BUMETANIDE 4 MG: 2 TABLET ORAL at 08:33

## 2021-02-20 RX ADMIN — ISOSORBIDE DINITRATE 10 MG: 10 TABLET ORAL at 19:27

## 2021-02-20 RX ADMIN — METHOCARBAMOL 500 MG: 500 TABLET, FILM COATED ORAL at 17:47

## 2021-02-20 RX ADMIN — FLUTICASONE FUROATE AND VILANTEROL TRIFENATATE 1 PUFF: 100; 25 POWDER RESPIRATORY (INHALATION) at 08:31

## 2021-02-20 RX ADMIN — ACETAMINOPHEN 650 MG: 325 TABLET, FILM COATED ORAL at 19:26

## 2021-02-20 RX ADMIN — AMIODARONE HYDROCHLORIDE 200 MG: 200 TABLET ORAL at 08:34

## 2021-02-20 RX ADMIN — ACETAMINOPHEN 650 MG: 325 TABLET, FILM COATED ORAL at 23:59

## 2021-02-20 RX ADMIN — ANAKINRA 100 MG: 100 INJECTION, SOLUTION SUBCUTANEOUS at 19:31

## 2021-02-20 RX ADMIN — HYDROMORPHONE HYDROCHLORIDE 2 MG: 2 TABLET ORAL at 21:05

## 2021-02-20 RX ADMIN — ACETAMINOPHEN 650 MG: 325 TABLET, FILM COATED ORAL at 06:46

## 2021-02-20 RX ADMIN — BUPROPION HYDROCHLORIDE 150 MG: 150 TABLET, EXTENDED RELEASE ORAL at 08:33

## 2021-02-20 RX ADMIN — POLYETHYLENE GLYCOL 3350 17 G: 17 POWDER, FOR SOLUTION ORAL at 08:37

## 2021-02-20 RX ADMIN — HYDROMORPHONE HYDROCHLORIDE 2 MG: 2 TABLET ORAL at 03:40

## 2021-02-20 RX ADMIN — GUAIFENESIN 600 MG: 600 TABLET ORAL at 19:27

## 2021-02-20 RX ADMIN — ACETAMINOPHEN 650 MG: 325 TABLET, FILM COATED ORAL at 14:04

## 2021-02-20 RX ADMIN — HYDROMORPHONE HYDROCHLORIDE 2 MG: 2 TABLET ORAL at 09:41

## 2021-02-20 ASSESSMENT — ACTIVITIES OF DAILY LIVING (ADL)
ADLS_ACUITY_SCORE: 15
ADLS_ACUITY_SCORE: 13
ADLS_ACUITY_SCORE: 13
ADLS_ACUITY_SCORE: 15
ADLS_ACUITY_SCORE: 15
ADLS_ACUITY_SCORE: 14

## 2021-02-20 ASSESSMENT — MIFFLIN-ST. JEOR: SCORE: 1735.21

## 2021-02-20 NOTE — PROGRESS NOTES
The patient HeartMate LVAD was interrogated 2/20/2021  * Speed 5900 rpm   * Pulsatility index 5.1   * Power 6.3 Manuel   * Flow 5.7 L/minute   Fluid status: euvolemic    Alarms were reviewed, and notable for frequent PI events with occasional associated speed drops.   The driveline exit site was inspected, c/d/i.   All external components were inspected and showed no evidence of damage or malfunction, none replaced.   No changes to VAD settings made

## 2021-02-20 NOTE — PROGRESS NOTES
[facility]   Cardiology II Service / Advanced Heart Failure  Daily Progress Note        Patient: Jim Willingham  MRN: [id number]  Admission Date: 2/8/2021  Hospital Day # 12    Assessment and Plan: Jim Willingham is a 65 year old male with history of NICM EF 20% c/b VT s/p CRT-D s/p HMII LVAD 6/19/2017 originally intended as destination therapy 2/2 obesity however with recent weight loss now candidate for transplantation. He is admitted for worsening functional status and renal function concerning for worsening heart failure. He is now listed status 2E for transplant, extension due 2/22/21.    Changes today  - Continue current diuretics  - Continue PRN dilaudid, encourage robaxin  - 2E extention due 2/22/21     Chronic systolic heart failure secondary to NICM s/p HM II LVAD. Echo 1/8/21 at 9400rpm, EF<30%m LVIDd 6.8cm, at least mildly reduced RV function, AoV closed without AI, mild-mod eccentric MR, dilated IVC without collapse.   Stage D, NYHA Class IIIB  ACEi/ARB contraindicated due to renal dysfunction. Hydralazine 75 mg po TID. Isordil 10 mg po TID.   BB contraindicated due to low cardiac output  Aldosterone antagonist contraindicated due to renal dysfunction  SCD prophylaxis CRT-D  Fluid status Euvolemic. Bumex 4 mg IV BID.   MAP: Goal 65-85  LDH: 303 2/16  Anticoagulation: Coumadin per pharmacy. INR- 2.3, goal 2-3.   Antiplatelet: ASA 81 mg po daily  - remains on the cardiac transplant list status 2E.     Non-traumatic Fall, in the setting of oxycodone  - Wean dilaudid as able.   - Appreciate PT/OT consult.      Carpel tunnel, bilateral s/p bilateral release. Evidence of thenar atrophy. Relief with steroid injection 11/20. Carpal tunnel release 2/18/21.  - Appreciate Ortho/Plastics consult.  - Robaxin 500 mg po TID prn.   - Ice prn.   - Changed to Dilaudid overnight, decrease dose to 2 mg q3h prn.   - Scheduled Tylenol 650 mg po QID.   - Gabapentin 300 mg in AM, 300 mg in the afternoon, and 600  "mg in the evening.      WALTER on CKD Stage III. Cr peaked at 2.22  - Cr-1.53 (1.58).      History of Afib. History of VT/VF. CHADSVASC-5.   - Continue Amiodarone 200 mg po daily.   - Coumadin as above.      Acute Gout. Minimal improvement with Colchicine times one.   - Appreciate Rheumatology consult.   - Anakinra 100 mg subcutaneous daily for 3 days.      Rhinovirus, resolved. Bacterial bronchitis. Completed 5 day course of Cefdinir. COVID negative 2/8. Resp PCR +rhinovirus. CXR 2/12 with no overt PNA. Stopped cefdinir 2/15. Cleared for transplant per ID.   - repeat RVP-3/5 per transplant ID     Chronic/resolved conditions  DM type II, controlled: Hgb A1C-6.2. Glargine stopped per patient request on 2/14/21, medium dose Novolog sliding scale insulin.  COPD/Asthma: pta Breo Ellipta, albuterol prn.   Depression: pta Bupropion  Right sided weakness, resolved. CT head negative for acute findings. Appreciate Neuro eval.   Staph Hominis Bacteremia. No need for surveillance blood cx per transplant ID.       FEN: 2 gram sodium diet   PROPHY:  Coumadin  LINES:  PIV   DISPO:  TBD pending cardiac transplant.   CODE STATUS: Full Code   ================================================================    Interval History/ROS: He complains of improving bilateral wrist pain at incision site. Improved function already in his right hand. He denies fever, chills, dizziness, chest pain, palpitations, cough, RESENDIZ, nausea, vomiting, diarrhea, melena, hematochezia, and LE edema. He is tolerating oral intake. He will continue to work with therapy.    Last 24 hr care team notes reviewed.   ROS:  4 point ROS including Respiratory, CV, GI and , other than that noted in the HPI, is negative.     Medications: Reviewed in EPIC.     Physical Exam:   BP 94/78 (BP Location: Left arm)   Pulse 73   Temp 98.6  F (37  C) (Oral)   Resp 12   Ht 1.727 m (5' 8\")   Wt 96.6 kg (212 lb 14.4 oz)   SpO2 98%   BMI 32.37 kg/m    GENERAL: Appears alert and " interacting appropriatly.  HEENT: Eye symmetrical and free of discharge bilaterally. Mucous membranes moist and without lesions.  NECK: Supple and without lymphadenopathy. JVD 8 cm.   CV: RRR, S1S2 present with LVAD hum.   RESPIRATORY: Respirations regular, even, and unlabored. Lungs CTA throughout.   GI: Soft and non distended with normoactive bowel sounds present in all quadrants. No tenderness, rebound, guarding. No organomegaly.   EXTREMITIES: Trace bilateral LE peripheral edema. 2+ bilateral pedal pulses.   NEUROLOGIC: Alert and orientated interacting appropriatly. No focal deficits.   MUSCULOSKELETAL: No joint swelling or tenderness.   SKIN: No jaundice. No rashes or lesions. LVAD drive line covered.     Data:  CMP  Recent Labs   Lab 02/20/21  0626 02/19/21  0545 02/18/21  0630 02/17/21  0527 02/16/21  0859 02/16/21  0532    134 138 135  --  132*   POTASSIUM 3.8 3.6 3.7 4.0  --  4.2   CHLORIDE 101 100 101 101  --  97   CO2 28 26 31 30  --  31   ANIONGAP 6 9 6 5  --  3   * 108* 94 112*  --  116*   BUN 33* 42* 44* 37*  --  36*   CR 1.53* 1.58* 1.77* 1.69*  --  1.63*   GFRESTIMATED 47* 46* 40* 42*  --  44*   GFRESTBLACK 55* 53* 46* 49*  --  51*   QAMAR 8.9 8.8 9.0 8.7  --  8.8   MAG 2.2 2.4* 2.6* 2.5*  --  2.4*   PROTTOTAL  --   --   --   --  6.8 6.6*   ALBUMIN  --   --   --   --  3.3* 3.2*   BILITOTAL  --   --   --   --  0.7 0.6   ALKPHOS  --   --   --   --  104 99   AST  --   --   --   --  38 34   ALT  --   --   --   --  39 39     CBC  Recent Labs   Lab 02/19/21  0545 02/17/21  0527 02/15/21  0553 02/14/21  0604   WBC 7.0 6.5 5.9 8.7   RBC 3.81* 3.53* 3.86* 4.07*   HGB 10.7* 9.9* 10.7* 10.8*   HCT 34.3* 31.8* 35.0* 36.8*   MCV 90 90 91 90   MCH 28.1 28.0 27.7 26.5   MCHC 31.2* 31.1* 30.6* 29.3*   RDW 19.5* 19.6* 19.4* 19.5*    212 238 236     INR  Recent Labs   Lab 02/20/21  0626 02/19/21  0545 02/18/21  0630 02/17/21  0527   INR 2.30* 2.01* 1.93* 2.25*       Patient discussed with Dr. Conte.       Didi Butler PA-C  [facility]

## 2021-02-20 NOTE — PLAN OF CARE
D: Pt admit 2/8/21 for heart transplant status 2E. PMH NICM EF 20% c/b VT s/p CRTD s/p HM2 LVAD 6/19/2017    I/A:   Neuro: A&Ox4  VS: VSS. RA/home CPAP overnight  LVAD #'s WNL, Flow 7.2 pt asymptomatic resting comfortably, WNL this AM. no alarms this shift. Dressing CDI   Tele: SR  Pain: Pt c/o severe pain in bilateral wrists/hands following carpal tunnel release procedure 2/18 poorly controlled with PRN tylenol, PRN oral dilauded, PRN robaxin, and heat/ice packs as allowed by pt.   GI/: Urinating adequately into external male purewick overnight. No BM this shift.  Diet: 2g Na  BG/insulin: BG Check ACHS  IV/Drips: R PIV SL  Activity: SBA  Skin/drains: L and R wrist covered with primapore following carpal tunnel surgery, CDI. Abrasion on L knee cleansed per POC. Dressing CDI.   Maintain droplet precautions for Rhinovirus    P: Plan to Continue to monitor pt status and report changes to Cards 2.     Erna Rodriguez RN

## 2021-02-20 NOTE — PLAN OF CARE
D: Pt status 2E for heart transplant. Pt stable, AVSS, SR, LVAD #s WNL. POD 1 carpal tunnel release procedure, bilateral. Bilateral wrist/hand pain adequately controlled with tylenol, robaxin and PO dilaudid. No shortness of breath noted. WOC RN today with care plan for abrasion on knee. Pt using urinal with assistance. Pt prefers external catheter overnight.   I: Monitored/assessed pt. Assisted with cares.  A: Pt stable and comfortable.  P: Continue to monitor/assess pt, contact provider with concerns.

## 2021-02-21 ENCOUNTER — APPOINTMENT (OUTPATIENT)
Dept: OCCUPATIONAL THERAPY | Facility: CLINIC | Age: 66
DRG: 001 | End: 2021-02-21
Attending: INTERNAL MEDICINE
Payer: COMMERCIAL

## 2021-02-21 LAB
ANION GAP SERPL CALCULATED.3IONS-SCNC: 6 MMOL/L (ref 3–14)
BUN SERPL-MCNC: 33 MG/DL (ref 7–30)
CALCIUM SERPL-MCNC: 8.7 MG/DL (ref 8.5–10.1)
CHLORIDE SERPL-SCNC: 98 MMOL/L (ref 94–109)
CO2 SERPL-SCNC: 29 MMOL/L (ref 20–32)
CREAT SERPL-MCNC: 1.53 MG/DL (ref 0.66–1.25)
ERYTHROCYTE [DISTWIDTH] IN BLOOD BY AUTOMATED COUNT: 18.8 % (ref 10–15)
GFR SERPL CREATININE-BSD FRML MDRD: 47 ML/MIN/{1.73_M2}
GLUCOSE BLDC GLUCOMTR-MCNC: 104 MG/DL (ref 70–99)
GLUCOSE BLDC GLUCOMTR-MCNC: 115 MG/DL (ref 70–99)
GLUCOSE BLDC GLUCOMTR-MCNC: 118 MG/DL (ref 70–99)
GLUCOSE BLDC GLUCOMTR-MCNC: 123 MG/DL (ref 70–99)
GLUCOSE BLDC GLUCOMTR-MCNC: 76 MG/DL (ref 70–99)
GLUCOSE SERPL-MCNC: 98 MG/DL (ref 70–99)
HCT VFR BLD AUTO: 37 % (ref 40–53)
HGB BLD-MCNC: 11.2 G/DL (ref 13.3–17.7)
INR PPP: 2.38 (ref 0.86–1.14)
MAGNESIUM SERPL-MCNC: 2.2 MG/DL (ref 1.6–2.3)
MCH RBC QN AUTO: 27.5 PG (ref 26.5–33)
MCHC RBC AUTO-ENTMCNC: 30.3 G/DL (ref 31.5–36.5)
MCV RBC AUTO: 91 FL (ref 78–100)
PLATELET # BLD AUTO: 266 10E9/L (ref 150–450)
POTASSIUM SERPL-SCNC: 3.8 MMOL/L (ref 3.4–5.3)
RBC # BLD AUTO: 4.07 10E12/L (ref 4.4–5.9)
SODIUM SERPL-SCNC: 132 MMOL/L (ref 133–144)
WBC # BLD AUTO: 5.1 10E9/L (ref 4–11)

## 2021-02-21 PROCEDURE — 83735 ASSAY OF MAGNESIUM: CPT | Performed by: STUDENT IN AN ORGANIZED HEALTH CARE EDUCATION/TRAINING PROGRAM

## 2021-02-21 PROCEDURE — 999N001017 HC STATISTIC GLUCOSE BY METER IP

## 2021-02-21 PROCEDURE — 250N000013 HC RX MED GY IP 250 OP 250 PS 637: Performed by: STUDENT IN AN ORGANIZED HEALTH CARE EDUCATION/TRAINING PROGRAM

## 2021-02-21 PROCEDURE — 97530 THERAPEUTIC ACTIVITIES: CPT | Mod: GO

## 2021-02-21 PROCEDURE — 250N000009 HC RX 250: Performed by: INTERNAL MEDICINE

## 2021-02-21 PROCEDURE — 214N000001 HC R&B CCU UMMC

## 2021-02-21 PROCEDURE — 250N000013 HC RX MED GY IP 250 OP 250 PS 637: Performed by: NURSE PRACTITIONER

## 2021-02-21 PROCEDURE — 85027 COMPLETE CBC AUTOMATED: CPT | Performed by: STUDENT IN AN ORGANIZED HEALTH CARE EDUCATION/TRAINING PROGRAM

## 2021-02-21 PROCEDURE — 250N000013 HC RX MED GY IP 250 OP 250 PS 637: Performed by: INTERNAL MEDICINE

## 2021-02-21 PROCEDURE — 85610 PROTHROMBIN TIME: CPT | Performed by: STUDENT IN AN ORGANIZED HEALTH CARE EDUCATION/TRAINING PROGRAM

## 2021-02-21 PROCEDURE — 93750 INTERROGATION VAD IN PERSON: CPT | Performed by: PHYSICIAN ASSISTANT

## 2021-02-21 PROCEDURE — 99232 SBSQ HOSP IP/OBS MODERATE 35: CPT | Mod: 25 | Performed by: PHYSICIAN ASSISTANT

## 2021-02-21 PROCEDURE — 80048 BASIC METABOLIC PNL TOTAL CA: CPT | Performed by: STUDENT IN AN ORGANIZED HEALTH CARE EDUCATION/TRAINING PROGRAM

## 2021-02-21 PROCEDURE — 36415 COLL VENOUS BLD VENIPUNCTURE: CPT | Performed by: STUDENT IN AN ORGANIZED HEALTH CARE EDUCATION/TRAINING PROGRAM

## 2021-02-21 RX ORDER — WARFARIN SODIUM 7.5 MG/1
7.5 TABLET ORAL
Status: COMPLETED | OUTPATIENT
Start: 2021-02-21 | End: 2021-02-21

## 2021-02-21 RX ADMIN — GABAPENTIN 300 MG: 300 CAPSULE ORAL at 13:50

## 2021-02-21 RX ADMIN — Medication 10 MG: at 22:39

## 2021-02-21 RX ADMIN — METHOCARBAMOL 500 MG: 500 TABLET, FILM COATED ORAL at 22:39

## 2021-02-21 RX ADMIN — HYDRALAZINE HYDROCHLORIDE 75 MG: 25 TABLET, FILM COATED ORAL at 13:50

## 2021-02-21 RX ADMIN — GUAIFENESIN 600 MG: 600 TABLET ORAL at 21:10

## 2021-02-21 RX ADMIN — ASPIRIN 81 MG CHEWABLE TABLET 81 MG: 81 TABLET CHEWABLE at 08:02

## 2021-02-21 RX ADMIN — UMECLIDINIUM 1 PUFF: 62.5 AEROSOL, POWDER ORAL at 08:04

## 2021-02-21 RX ADMIN — ISOSORBIDE DINITRATE 10 MG: 10 TABLET ORAL at 08:00

## 2021-02-21 RX ADMIN — FLUTICASONE FUROATE AND VILANTEROL TRIFENATATE 1 PUFF: 100; 25 POWDER RESPIRATORY (INHALATION) at 08:04

## 2021-02-21 RX ADMIN — ISOSORBIDE DINITRATE 10 MG: 10 TABLET ORAL at 13:50

## 2021-02-21 RX ADMIN — POTASSIUM CHLORIDE 60 MEQ: 750 TABLET, EXTENDED RELEASE ORAL at 08:03

## 2021-02-21 RX ADMIN — ACETAMINOPHEN 650 MG: 325 TABLET, FILM COATED ORAL at 17:20

## 2021-02-21 RX ADMIN — HYDROMORPHONE HYDROCHLORIDE 2 MG: 2 TABLET ORAL at 22:39

## 2021-02-21 RX ADMIN — ACETAMINOPHEN 650 MG: 325 TABLET, FILM COATED ORAL at 05:04

## 2021-02-21 RX ADMIN — ISOSORBIDE DINITRATE 10 MG: 10 TABLET ORAL at 21:10

## 2021-02-21 RX ADMIN — HYDROMORPHONE HYDROCHLORIDE 2 MG: 2 TABLET ORAL at 13:48

## 2021-02-21 RX ADMIN — ANAKINRA 100 MG: 100 INJECTION, SOLUTION SUBCUTANEOUS at 21:11

## 2021-02-21 RX ADMIN — WARFARIN SODIUM 7.5 MG: 7.5 TABLET ORAL at 17:20

## 2021-02-21 RX ADMIN — ACETAMINOPHEN 650 MG: 325 TABLET, FILM COATED ORAL at 22:38

## 2021-02-21 RX ADMIN — ALLOPURINOL 300 MG: 300 TABLET ORAL at 08:01

## 2021-02-21 RX ADMIN — HYDRALAZINE HYDROCHLORIDE 75 MG: 25 TABLET, FILM COATED ORAL at 07:59

## 2021-02-21 RX ADMIN — GABAPENTIN 300 MG: 300 CAPSULE ORAL at 07:59

## 2021-02-21 RX ADMIN — METHOCARBAMOL 500 MG: 500 TABLET, FILM COATED ORAL at 02:02

## 2021-02-21 RX ADMIN — BUMETANIDE 4 MG: 2 TABLET ORAL at 17:20

## 2021-02-21 RX ADMIN — HYDROMORPHONE HYDROCHLORIDE 2 MG: 2 TABLET ORAL at 05:04

## 2021-02-21 RX ADMIN — DOCUSATE SODIUM 50 MG AND SENNOSIDES 8.6 MG 1 TABLET: 8.6; 5 TABLET, FILM COATED ORAL at 21:10

## 2021-02-21 RX ADMIN — BUPROPION HYDROCHLORIDE 150 MG: 150 TABLET, EXTENDED RELEASE ORAL at 08:01

## 2021-02-21 RX ADMIN — BUMETANIDE 4 MG: 2 TABLET ORAL at 08:00

## 2021-02-21 RX ADMIN — HYDRALAZINE HYDROCHLORIDE 75 MG: 25 TABLET, FILM COATED ORAL at 21:11

## 2021-02-21 RX ADMIN — ACETAMINOPHEN 650 MG: 325 TABLET, FILM COATED ORAL at 11:46

## 2021-02-21 RX ADMIN — MONTELUKAST 10 MG: 10 TABLET, FILM COATED ORAL at 21:10

## 2021-02-21 RX ADMIN — HYDROMORPHONE HYDROCHLORIDE 2 MG: 2 TABLET ORAL at 07:59

## 2021-02-21 RX ADMIN — GABAPENTIN 600 MG: 300 CAPSULE ORAL at 21:10

## 2021-02-21 RX ADMIN — GUAIFENESIN 600 MG: 600 TABLET ORAL at 08:01

## 2021-02-21 RX ADMIN — METHOCARBAMOL 500 MG: 500 TABLET, FILM COATED ORAL at 12:03

## 2021-02-21 RX ADMIN — AMIODARONE HYDROCHLORIDE 200 MG: 200 TABLET ORAL at 08:01

## 2021-02-21 ASSESSMENT — ACTIVITIES OF DAILY LIVING (ADL)
ADLS_ACUITY_SCORE: 12
ADLS_ACUITY_SCORE: 12
ADLS_ACUITY_SCORE: 13
ADLS_ACUITY_SCORE: 12

## 2021-02-21 ASSESSMENT — MIFFLIN-ST. JEOR: SCORE: 1745.64

## 2021-02-21 NOTE — PROGRESS NOTES
[facility]   Cardiology II Service / Advanced Heart Failure  Daily Progress Note        Patient: Jim Willingham  MRN: [id number]  Admission Date: 2/8/2021  Hospital Day # 13    Assessment and Plan: Jim Willingham is a 65 year old male with history of NICM EF 20% c/b VT s/p CRT-D s/p HMII LVAD 6/19/2017 originally intended as destination therapy 2/2 obesity however with recent weight loss now candidate for transplantation. He is admitted for worsening functional status and renal function concerning for worsening heart failure. He is now listed status 2E for transplant, extension due 2/22/21.    Changes today  - Continue current diuretics  - Continue PRN dilaudid, encourage robaxin  - 2E extention due 2/22/21     Chronic systolic heart failure secondary to NICM s/p HM II LVAD. Echo 1/8/21 at 9400rpm, EF<30%m LVIDd 6.8cm, at least mildly reduced RV function, AoV closed without AI, mild-mod eccentric MR, dilated IVC without collapse.   Stage D, NYHA Class IIIB  ACEi/ARB contraindicated due to renal dysfunction. Hydralazine 75 mg po TID. Isordil 10 mg po TID.   BB contraindicated due to low cardiac output  Aldosterone antagonist contraindicated due to renal dysfunction  SCD prophylaxis CRT-D  Fluid status Euvolemic. Continue bumex 4 mg IV BID.   MAP: Goal 65-85  LDH: 298 2/21/21  Anticoagulation: Coumadin per pharmacy. INR- 2.38, goal 2-3.   Antiplatelet: ASA 81 mg po daily  - remains on the cardiac transplant list status 2E.     Non-traumatic Fall, in the setting of oxycodone  - Wean dilaudid as able.   - Appreciate PT/OT consult.     Hyponatremia, mild, . Asymptomatic  - Continue to trend with daily BMP     Carpel tunnel, bilateral s/p bilateral release. Evidence of thenar atrophy. Relief with steroid injection 11/20. Carpal tunnel release 2/18/21.  - Appreciate Ortho/Plastics consult.  - Robaxin 500 mg po TID prn.   - Ice prn.   - Changed to Dilaudid overnight, decrease dose to 2 mg q3h prn, will  discuss duration of opiods with surgery team  - Scheduled Tylenol 650 mg po QID.   - Gabapentin 300 mg in AM, 300 mg in the afternoon, and 600 mg in the evening.      WALTER on CKD Stage III. Cr peaked at 2.22  - Cr-1.53 (1.53).      History of Afib. History of VT/VF. CHADSVASC-5.   - Continue Amiodarone 200 mg po daily.   - Coumadin as above.      Acute Gout. Minimal improvement with Colchicine times one.   - Appreciate Rheumatology consult.   - Anakinra 100 mg subcutaneous daily for 3 days.      Rhinovirus, resolved. Bacterial bronchitis. Completed 5 day course of Cefdinir. COVID negative 2/8. Resp PCR +rhinovirus. CXR 2/12 with no overt PNA. Stopped cefdinir 2/15. Cleared for transplant per ID.   - repeat RVP-3/5 per transplant ID  - will call infectious control on Monday when open     Chronic/resolved conditions  DM type II, controlled: Hgb A1C-6.2. Glargine stopped per patient request on 2/14/21, medium dose Novolog sliding scale insulin.  COPD/Asthma: pta Breo Ellipta, albuterol prn.   Depression: pta Bupropion  Right sided weakness, resolved. CT head negative for acute findings. Appreciate Neuro eval.   Staph Hominis Bacteremia. No need for surveillance blood cx per transplant ID.       FEN: 2 gram sodium diet   PROPHY:  Coumadin  LINES:  PIV   DISPO:  TBD pending cardiac transplant.   CODE STATUS: Full Code   ================================================================    Interval History/ROS: His wrist pain continues to improve. It is currently a 6/10. He mostly notes burning of his b/l hands which is a bit worse today. Improved function already in both hands.. He denies fever, chills, dizziness, chest pain, palpitations, cough, RESENDIZ, nausea, vomiting, diarrhea, melena, hematochezia. He does have a small amount of LE edema today. He is tolerating oral intake. He will continue to work with therapy.    Last 24 hr care team notes reviewed.   ROS:  4 point ROS including Respiratory, CV, GI and , other than  "that noted in the HPI, is negative.     Medications: Reviewed in EPIC.     Physical Exam:   BP 97/81 (BP Location: Left arm)   Pulse 62   Temp 97.7  F (36.5  C) (Oral)   Resp 16   Ht 1.727 m (5' 8\")   Wt 97.6 kg (215 lb 3.2 oz)   SpO2 97%   BMI 32.72 kg/m    GENERAL: Appears alert and interacting appropriatly.  HEENT: Eye symmetrical and free of discharge bilaterally. Mucous membranes moist and without lesions.  NECK: Supple and without lymphadenopathy. JVD 9 cm.   CV: RRR, S1S2 present with LVAD hum.   RESPIRATORY: Respirations regular, even, and unlabored. Lungs CTA throughout.   GI: Soft and non distended with normoactive bowel sounds present in all quadrants. No tenderness, rebound, guarding. No organomegaly.   EXTREMITIES: Trace bilateral LE peripheral edema, slightly increased from yesterday's exam.   NEUROLOGIC: Alert and orientated interacting appropriatly. No focal deficits.   MUSCULOSKELETAL: No joint swelling or tenderness.   SKIN: No jaundice. No rashes or lesions. LVAD drive line covered.     Data:  CMP  Recent Labs   Lab 02/21/21  0535 02/20/21  0626 02/19/21  0545 02/18/21  0630 02/16/21  0859 02/16/21  0859 02/16/21  0532   * 135 134 138   < >  --  132*   POTASSIUM 3.8 3.8 3.6 3.7   < >  --  4.2   CHLORIDE 98 101 100 101   < >  --  97   CO2 29 28 26 31   < >  --  31   ANIONGAP 6 6 9 6   < >  --  3   GLC 98 117* 108* 94   < >  --  116*   BUN 33* 33* 42* 44*   < >  --  36*   CR 1.53* 1.53* 1.58* 1.77*   < >  --  1.63*   GFRESTIMATED 47* 47* 46* 40*   < >  --  44*   GFRESTBLACK 55* 55* 53* 46*   < >  --  51*   QAMAR 8.7 8.9 8.8 9.0   < >  --  8.8   MAG 2.2 2.2 2.4* 2.6*   < >  --  2.4*   PROTTOTAL  --   --   --   --   --  6.8 6.6*   ALBUMIN  --   --   --   --   --  3.3* 3.2*   BILITOTAL  --   --   --   --   --  0.7 0.6   ALKPHOS  --   --   --   --   --  104 99   AST  --   --   --   --   --  38 34   ALT  --   --   --   --   --  39 39    < > = values in this interval not displayed. "     CBC  Recent Labs   Lab 02/21/21  0535 02/19/21  0545 02/17/21  0527 02/15/21  0553   WBC 5.1 7.0 6.5 5.9   RBC 4.07* 3.81* 3.53* 3.86*   HGB 11.2* 10.7* 9.9* 10.7*   HCT 37.0* 34.3* 31.8* 35.0*   MCV 91 90 90 91   MCH 27.5 28.1 28.0 27.7   MCHC 30.3* 31.2* 31.1* 30.6*   RDW 18.8* 19.5* 19.6* 19.4*    251 212 238     INR  Recent Labs   Lab 02/21/21  0535 02/20/21  0626 02/19/21  0545 02/18/21  0630   INR 2.38* 2.30* 2.01* 1.93*       Patient discussed with Dr. Conte.      Didi Butler PA-C  [facility]

## 2021-02-21 NOTE — PROGRESS NOTES
The patient's HeartMate LVAD was interrogated 2/21/2021  * Speed 5600 rpm   * Pulsatility index 3.1   * Power 7.1 Manuel   * Flow 7.0 L/minute   Fluid status: mildly hypervolemic   Alarms were reviewed, and notable for frequent PI events with occasional speed drops.   The driveline exit site was inspected, c/d/i.   All external components were inspected and showed no evidence of damage or malfunction, none replaced.   No changes to VAD settings made

## 2021-02-21 NOTE — PLAN OF CARE
D: Pt status 2E for heart transplant. Pt stable, AVSS, SR, LVAD #s WNL. POD 2 carpal tunnel release procedure, bilateral. Bilateral wrist/hand pain adequately controlled with tylenol, robaxin and PO dilaudid. No shortness of breath noted. Pt using urinal independently. Cleard by PT to walk halls without attendant.   I: Monitored/assessed pt. Assisted with cares.  A: Pt stable and comfortable.  P: Continue to monitor/assess pt, contact provider with concerns.

## 2021-02-21 NOTE — PLAN OF CARE
D: Pt admit 2/8/21 for heart transplant status 2E. PMH NICM EF 20% c/b VT s/p CRTD s/p HM2 LVAD 6/19/2017     I/A:   Neuro: A&Ox4  VS: VSS. RA/home CPAP overnight  LVAD #'s WNL. no alarms this shift. Dressing CDI   Tele: SR w/1st degree AVB  Pain: Pt c/o pain in bilateral wrists/hands following carpal tunnel release procedure 2/18 controlled with PRN tylenol, PRN oral dilauded, PRN robaxin  GI/: Urinating adequately into bedside urinal. BM x1 per pt  Diet: 2g Na  BG/insulin: BG Check ACHS  IV/Drips: R PIV SL  Activity: independent  Skin/drains: L and R wrist covered with primapore following carpal tunnel surgery, CDI. Abrasion on L knee dressing CDI.   Maintain droplet precautions for Rhinovirus     P: Plan to Continue to monitor pt status and report changes to Cards 2.      Erna Rodriguez RN

## 2021-02-22 ENCOUNTER — APPOINTMENT (OUTPATIENT)
Dept: PHYSICAL THERAPY | Facility: CLINIC | Age: 66
DRG: 001 | End: 2021-02-22
Attending: INTERNAL MEDICINE
Payer: COMMERCIAL

## 2021-02-22 PROBLEM — I42.8 NICM (NONISCHEMIC CARDIOMYOPATHY) (H): Status: ACTIVE | Noted: 2017-05-11

## 2021-02-22 PROBLEM — Z95.811 LVAD (LEFT VENTRICULAR ASSIST DEVICE) PRESENT (H): Status: ACTIVE | Noted: 2017-06-26

## 2021-02-22 LAB
ANION GAP SERPL CALCULATED.3IONS-SCNC: 8 MMOL/L (ref 3–14)
BUN SERPL-MCNC: 34 MG/DL (ref 7–30)
CALCIUM SERPL-MCNC: 8.6 MG/DL (ref 8.5–10.1)
CHLORIDE SERPL-SCNC: 100 MMOL/L (ref 94–109)
CO2 SERPL-SCNC: 26 MMOL/L (ref 20–32)
CREAT SERPL-MCNC: 1.66 MG/DL (ref 0.66–1.25)
GFR SERPL CREATININE-BSD FRML MDRD: 43 ML/MIN/{1.73_M2}
GLUCOSE BLDC GLUCOMTR-MCNC: 105 MG/DL (ref 70–99)
GLUCOSE BLDC GLUCOMTR-MCNC: 107 MG/DL (ref 70–99)
GLUCOSE BLDC GLUCOMTR-MCNC: 133 MG/DL (ref 70–99)
GLUCOSE BLDC GLUCOMTR-MCNC: 137 MG/DL (ref 70–99)
GLUCOSE BLDC GLUCOMTR-MCNC: 167 MG/DL (ref 70–99)
GLUCOSE BLDC GLUCOMTR-MCNC: 70 MG/DL (ref 70–99)
GLUCOSE SERPL-MCNC: 89 MG/DL (ref 70–99)
INR PPP: 2.84 (ref 0.86–1.14)
LABORATORY COMMENT REPORT: NORMAL
MAGNESIUM SERPL-MCNC: 2.3 MG/DL (ref 1.6–2.3)
POTASSIUM SERPL-SCNC: 3.8 MMOL/L (ref 3.4–5.3)
SARS-COV-2 RNA RESP QL NAA+PROBE: NEGATIVE
SODIUM SERPL-SCNC: 134 MMOL/L (ref 133–144)
SPECIMEN SOURCE: NORMAL

## 2021-02-22 PROCEDURE — 36415 COLL VENOUS BLD VENIPUNCTURE: CPT | Performed by: STUDENT IN AN ORGANIZED HEALTH CARE EDUCATION/TRAINING PROGRAM

## 2021-02-22 PROCEDURE — 83735 ASSAY OF MAGNESIUM: CPT | Performed by: STUDENT IN AN ORGANIZED HEALTH CARE EDUCATION/TRAINING PROGRAM

## 2021-02-22 PROCEDURE — 250N000013 HC RX MED GY IP 250 OP 250 PS 637: Performed by: INTERNAL MEDICINE

## 2021-02-22 PROCEDURE — U0003 INFECTIOUS AGENT DETECTION BY NUCLEIC ACID (DNA OR RNA); SEVERE ACUTE RESPIRATORY SYNDROME CORONAVIRUS 2 (SARS-COV-2) (CORONAVIRUS DISEASE [COVID-19]), AMPLIFIED PROBE TECHNIQUE, MAKING USE OF HIGH THROUGHPUT TECHNOLOGIES AS DESCRIBED BY CMS-2020-01-R: HCPCS | Performed by: PHYSICIAN ASSISTANT

## 2021-02-22 PROCEDURE — 97110 THERAPEUTIC EXERCISES: CPT | Mod: GP | Performed by: PHYSICAL THERAPIST

## 2021-02-22 PROCEDURE — 250N000013 HC RX MED GY IP 250 OP 250 PS 637: Performed by: NURSE PRACTITIONER

## 2021-02-22 PROCEDURE — U0005 INFEC AGEN DETEC AMPLI PROBE: HCPCS | Performed by: PHYSICIAN ASSISTANT

## 2021-02-22 PROCEDURE — 214N000001 HC R&B CCU UMMC

## 2021-02-22 PROCEDURE — 250N000013 HC RX MED GY IP 250 OP 250 PS 637: Performed by: PHYSICIAN ASSISTANT

## 2021-02-22 PROCEDURE — 250N000013 HC RX MED GY IP 250 OP 250 PS 637: Performed by: STUDENT IN AN ORGANIZED HEALTH CARE EDUCATION/TRAINING PROGRAM

## 2021-02-22 PROCEDURE — 85610 PROTHROMBIN TIME: CPT | Performed by: STUDENT IN AN ORGANIZED HEALTH CARE EDUCATION/TRAINING PROGRAM

## 2021-02-22 PROCEDURE — 80048 BASIC METABOLIC PNL TOTAL CA: CPT | Performed by: STUDENT IN AN ORGANIZED HEALTH CARE EDUCATION/TRAINING PROGRAM

## 2021-02-22 PROCEDURE — 99233 SBSQ HOSP IP/OBS HIGH 50: CPT | Performed by: INTERNAL MEDICINE

## 2021-02-22 PROCEDURE — 250N000009 HC RX 250: Performed by: PHYSICIAN ASSISTANT

## 2021-02-22 PROCEDURE — 999N001017 HC STATISTIC GLUCOSE BY METER IP

## 2021-02-22 PROCEDURE — 99232 SBSQ HOSP IP/OBS MODERATE 35: CPT | Performed by: PHYSICIAN ASSISTANT

## 2021-02-22 RX ORDER — WARFARIN SODIUM 2.5 MG/1
2.5 TABLET ORAL
Status: COMPLETED | OUTPATIENT
Start: 2021-02-22 | End: 2021-02-22

## 2021-02-22 RX ORDER — HYDROMORPHONE HYDROCHLORIDE 2 MG/1
2 TABLET ORAL EVERY 4 HOURS PRN
Status: DISCONTINUED | OUTPATIENT
Start: 2021-02-22 | End: 2021-02-22

## 2021-02-22 RX ORDER — WARFARIN SODIUM 5 MG/1
5 TABLET ORAL
Status: DISCONTINUED | OUTPATIENT
Start: 2021-02-22 | End: 2021-02-22

## 2021-02-22 RX ADMIN — METHOCARBAMOL 500 MG: 500 TABLET, FILM COATED ORAL at 21:08

## 2021-02-22 RX ADMIN — GABAPENTIN 300 MG: 300 CAPSULE ORAL at 14:19

## 2021-02-22 RX ADMIN — METHOCARBAMOL 500 MG: 500 TABLET, FILM COATED ORAL at 12:55

## 2021-02-22 RX ADMIN — ISOSORBIDE DINITRATE 10 MG: 10 TABLET ORAL at 20:06

## 2021-02-22 RX ADMIN — Medication 10 MG: at 22:29

## 2021-02-22 RX ADMIN — AMIODARONE HYDROCHLORIDE 200 MG: 200 TABLET ORAL at 08:08

## 2021-02-22 RX ADMIN — Medication 2 MG: at 21:08

## 2021-02-22 RX ADMIN — ISOSORBIDE DINITRATE 10 MG: 10 TABLET ORAL at 14:19

## 2021-02-22 RX ADMIN — HYDROMORPHONE HYDROCHLORIDE 2 MG: 2 TABLET ORAL at 03:41

## 2021-02-22 RX ADMIN — BUMETANIDE 4 MG: 2 TABLET ORAL at 08:08

## 2021-02-22 RX ADMIN — POTASSIUM CHLORIDE 60 MEQ: 750 TABLET, EXTENDED RELEASE ORAL at 08:09

## 2021-02-22 RX ADMIN — GUAIFENESIN 600 MG: 600 TABLET ORAL at 08:09

## 2021-02-22 RX ADMIN — ALBUTEROL SULFATE 2 PUFF: 90 AEROSOL, METERED RESPIRATORY (INHALATION) at 14:19

## 2021-02-22 RX ADMIN — UMECLIDINIUM 1 PUFF: 62.5 AEROSOL, POWDER ORAL at 08:10

## 2021-02-22 RX ADMIN — HYDRALAZINE HYDROCHLORIDE 75 MG: 25 TABLET, FILM COATED ORAL at 14:19

## 2021-02-22 RX ADMIN — Medication 2 MG: at 12:55

## 2021-02-22 RX ADMIN — ASPIRIN 81 MG CHEWABLE TABLET 81 MG: 81 TABLET CHEWABLE at 08:08

## 2021-02-22 RX ADMIN — ACETAMINOPHEN 650 MG: 325 TABLET, FILM COATED ORAL at 03:41

## 2021-02-22 RX ADMIN — GABAPENTIN 300 MG: 300 CAPSULE ORAL at 08:09

## 2021-02-22 RX ADMIN — WARFARIN SODIUM 2.5 MG: 2.5 TABLET ORAL at 17:52

## 2021-02-22 RX ADMIN — BUPROPION HYDROCHLORIDE 150 MG: 150 TABLET, EXTENDED RELEASE ORAL at 08:10

## 2021-02-22 RX ADMIN — ACETAMINOPHEN 650 MG: 325 TABLET, FILM COATED ORAL at 18:07

## 2021-02-22 RX ADMIN — ANAKINRA 100 MG: 100 INJECTION, SOLUTION SUBCUTANEOUS at 20:08

## 2021-02-22 RX ADMIN — ISOSORBIDE DINITRATE 10 MG: 10 TABLET ORAL at 08:09

## 2021-02-22 RX ADMIN — ACETAMINOPHEN 650 MG: 325 TABLET, FILM COATED ORAL at 12:55

## 2021-02-22 RX ADMIN — BUMETANIDE 4 MG: 2 TABLET ORAL at 17:52

## 2021-02-22 RX ADMIN — GABAPENTIN 600 MG: 300 CAPSULE ORAL at 22:29

## 2021-02-22 RX ADMIN — HYDRALAZINE HYDROCHLORIDE 75 MG: 25 TABLET, FILM COATED ORAL at 20:06

## 2021-02-22 RX ADMIN — HYDRALAZINE HYDROCHLORIDE 75 MG: 25 TABLET, FILM COATED ORAL at 08:08

## 2021-02-22 RX ADMIN — GUAIFENESIN 600 MG: 600 TABLET ORAL at 20:06

## 2021-02-22 RX ADMIN — DOCUSATE SODIUM 50 MG AND SENNOSIDES 8.6 MG 1 TABLET: 8.6; 5 TABLET, FILM COATED ORAL at 20:05

## 2021-02-22 RX ADMIN — FLUTICASONE FUROATE AND VILANTEROL TRIFENATATE 1 PUFF: 100; 25 POWDER RESPIRATORY (INHALATION) at 08:10

## 2021-02-22 RX ADMIN — ALLOPURINOL 300 MG: 300 TABLET ORAL at 08:07

## 2021-02-22 RX ADMIN — MONTELUKAST 10 MG: 10 TABLET, FILM COATED ORAL at 22:29

## 2021-02-22 ASSESSMENT — ACTIVITIES OF DAILY LIVING (ADL)
ADLS_ACUITY_SCORE: 12

## 2021-02-22 ASSESSMENT — MIFFLIN-ST. JEOR: SCORE: 1751.54

## 2021-02-22 NOTE — PLAN OF CARE
PT: Cx, pt declined PT x 2, reported he is hopeful to walk later but had a low blood sugar and has been instructed to rest recently.

## 2021-02-22 NOTE — PROGRESS NOTES
D: Bilateral carpal tunnel release on 2/18. LVAD 6/19/2017.    I: Monitored vitals, BG, and assessed pt status.   Changed: LVAD dressing, knee dressing, and wrist dressings.  PRN: Albuterol inhaler, tylenol 650mg, oral dilaudid 2mg, robaxin 500mg    A: A&Ox4. HR: 62-72 bpm. O2: 96% on RA. Afebrile. RR: 16. B/P: 111-85 (systolic)/ 86-57 (diastolic). SR w/1st degree AVB. LVAD #s WNL. BG check ACHS. BG at 1216 was 70, gave 3 orange juices and rechecked at 1237. BG was 137 at 1237. On droplet precautions for rhinovirus. Completed COVID-19 swab today. Up independent and completed a couple of walks during shift. Urinating adequately into bedside urinal. BMx1.    P: NPO for RH cath. Continue to monitor pt status and report changes to Cards 2.

## 2021-02-22 NOTE — PLAN OF CARE
D: Pt admit 2/8/21 for heart transplant status 2E. PMH NICM EF 20% c/b VT s/p CRTD s/p HM2 LVAD 6/19/2017     I/A:   Neuro: A&Ox4  VS: VSS. RA. Pt declined home CPAP overnight  LVAD #'s WNL. no alarms this shift. Dressing CDI   Tele: SR w/1st degree AVB  Pain: Pt c/o pain in bilateral wrists/hands following carpal tunnel release procedure 2/18 controlled with PRN tylenol, PRN oral dilauded, PRN robaxin  GI/: Urinating adequately into bedside urinal. BM x1   Diet: 2g Na  BG/insulin: BG Check ACHS  IV/Drips: R PIV SL  Activity: independent  Skin/drains: L and R wrist covered with primapore following carpal tunnel surgery, CDI. Abrasion on L knee dressing CDI.   Maintain droplet precautions for Rhinovirus     P: Plan to Continue to monitor pt status and report changes to Cards 2.      Erna Rodriguez RN

## 2021-02-22 NOTE — PLAN OF CARE
D: Pt status 2E for heart transplant. Pt stable, AVSS, SR, LVAD #s with higher flows and garcia today. Cards 2 PA notified. POD 3 carpal tunnel release procedure, bilateral. Bilateral wrist/hand pain adequately controlled with tylenol, robaxin and PO dilaudid. No shortness of breath noted. Pt using urinal independently.   I: Monitored/assessed pt. Assisted with cares.  A: Pt stable and comfortable.  P: Continue to monitor/assess pt, contact provider with concerns.

## 2021-02-22 NOTE — PROGRESS NOTES
"Rheumatology progress note  2/22/21    Subjective/ interval events  Had bilateral carpal tunnel release on 2/18. That evening started with 100mg anakinra once daily x3 days. Last dose was yesterday. Has had noticeable improvement in the inflammatory arthritis attributable to his gout involving his left hand MCPs and right ankle. Right ankle swelling/redness/pain completely resolved. Left MCPs about 50% improved. He is now able to make a fist, though weak . Rates the pain 6/10. Non radiating. Dull deep ache. Still with swelling and some warmth/redness. While continues to improve, he is struggling with the combined post surgical limitations of his carpal tunnel release, though this is also improving.    14 point review of systems obtained and negative.    Objective:  Vital signs:  Temp: 98  F (36.7  C) Temp src: Oral BP: (!) 85/75 Pulse: 71   Resp: 16 SpO2: 96 % O2 Device: None (Room air)   Height: 172.7 cm (5' 8\") Weight: 98.2 kg (216 lb 8 oz)  Estimated body mass index is 32.92 kg/m  as calculated from the following:    Height as of this encounter: 1.727 m (5' 8\").    Weight as of this encounter: 98.2 kg (216 lb 8 oz).  GEN: sitting up in bed, pleasant as always  HEENT: no facial rash, sclera sclear  CV: no upper extremity dependent edema, though difficult to assess with post surgical swelling of wrists and gout flare of MCPs on left side  Pulm: breathing comfortably, no cough, no use of accessory muscles  Abdomen: not distended  MSK: shows me that he can make a fist on the left, though barely able to touch fingertips to palm. Still with swelling and mild erythema over the MCPs of the 2nd-5th left hand. Right hand without erythema/edema. Bilateral wrists with bandages.   Skin: no acute cutaneous lesions appreciated  Psych: pleasant, interactive, appropriate    Labs: reviewed  Imaging: no interval imaging to review    A/P  65 yr old who I follow in rheumatology clinic with gout who is admitted with worsening RESENDIZ " in the context of his known NICM EF 20% s/p LVAD undergoing eval and work-up for transplant. During admission, had his prednisone discontinued on admission due to history of staph hominis bacteremia in sept 2020 and possible upcoming transplant. He subsequently developed polyarticular gout flare and rheumatology consulted. He was started on 3 day course of 100mg anakinra daily on the evening on 2/18. Has had expected excellent response to this, though given his ongoing pain, stiffness, inflammatory arthritis affecting his left hand MCPs and the recent bilateral carpal tunnel release, he is struggling with both pain and function in his hands. I would recommend an additional 2 days of anakinra 100mg daily, today and tomorrow. Also,   Currently on 300mg of allopurinol without any paradoxical flare prophylaxis. Uric acid goal <5 only reached on 2/1/21. He should still be on paradoxical flare prophylaxis for another 4-6 months. Prednisone/ NSAIDs are not congruent with his cardiac/renal disease. If compatible with his overall plan, would recommend that he be started on colchicine 0.6mg daily to prevent gout flares over this time.     Recommendations:  1) Anakinra 100mg subcutaneous today and tomorrow which would be a total of 5 days  2) If compatible with cardiac transplant plan, start colchicine 0.6mg daily  3) Continue allopurinol 300mg daily  4) Will continue to follow     Elbert Pettit MD  [facility] Rheumatology

## 2021-02-22 NOTE — PROGRESS NOTES
CLINICAL NUTRITION SERVICES - ASSESSMENT NOTE     Nutrition Prescription    RECOMMENDATIONS FOR MDs/PROVIDERS TO ORDER:  Consider checking the following labs with RNY GB hx: Vitamins A, B1, B12, D, and E. Zinc, copper, ionized calcium, folic acid, and iron labs.    Malnutrition Status:    Patient does not meet two of the established criteria necessary for diagnosing malnutrition    Recommendations already ordered by Registered Dietitian (RD):  Prn snack/supplement order  Scheduled snack at 14:00    Future/Additional Recommendations:  1. Continue current diet, as ordered. Liberalize diet order if inadequate oral intake.   2. Monitor need for a fluid restriction, if warranted.  3. Monitor iron status.  4. Monitor BG control. DM 2. Hgb A1c of 6.2 on 1/7/21. BG was 133 on 2/22/21.   5. If TFs are needed (if/when post-op Tx), would rec place feeding tube (FT) via radiology due to RNY hx and initiate TFs, Nutren 1.5 (concentrated, maintenance TF formula). Initiate TFs at 15 mL/hr, advancing rate by 10 mL Q 8 hrs (or per provider discretion, pending hemodynamic stability) to goal rate of 65 mL/hr. Nutren 1.5 at goal 65 ml/hr to provide 2340 kcals (31 kcal/kg/day), 106 g PRO (1.4 g/kg/day), 1186 mL H2O, 275 g CHO and no fiber daily.          If begin tube feeds:    - Flush FT with 30 mL water Q 4 hrs for patency. Rec provider adjust free water flushes as needed, pending fluid status.   - Ensure K+/Mg++/Phos labs are ordered daily until TFs advance to goal infusion to evaluate for sx of refeeding with nutrition received. If lytes trend low, aggressively replace lytes.       - If not already ordered, order a multivitamin/mineral (certavite 15 mL/day via FT) to help ensure micronutrient needs being met with suspected hypermetabolic demands and potential interruptions to TF infusions.   - Monitor TF and possible need to adjust nutrition support regimen if necessary, pending medical course and nutrition status.       - Monitor  "need to check a metabolic cart study.     - Send a nutrition consult for \"Registered Dietitian to Order TF per Medical Nutrition Therapy Guidelines\" if desire RD to order TFs.   6. Order the following (Sylvester-en-y Gastric Bypass) if pt agreeable:    Multivitamin/minerals: adult dose 2 times daily    Iron: 45-60 mg elemental daily (18-36 mg daily if low risk) - may partly or fully be covered in multivitamin     Calcium Citrate containing vitamin D: 500 mg 3 times daily or 600 mg 2 times daily    Vitamin B12: sublingual form of at least 500 mcg daily or injection of 1000 mcg monthly     B-50 Complex daily     REASON FOR ASSESSMENT  Jim Willingham is a/an 65 year old male assessed by the dietitian for LOS    NUTRITION/ADDITIONAL HISTORY  Per RD 9/14/20, \"Pt had a gastric bypass in 2003. Highest weight was 360 lbs, lost down to 220 lbs, regained and stabilized ~270 lbs. He watches the sodium in his diet. He also has a dexcom and reports closely monitoring his BG. On average, he has been eating 1 meal/day. Appetite overall is reduced 2/2 medical status and ?unknown per pt. However, he is not concerned and welcomes this reduced appetite in order to lose weight for txp. Vitamins, Supplements, Pertinent Meds: zinc. Herbal Medicines/Supplements: none.Physical Activity: Minimal now d/t SOB and ?fluid status; does some house work and ADLs.\" RD provided nutrition education on low sodium diet, wt loss goal, and post-op Tx nutrition education.   Diet Recall per RD on 9/14/20:  Breakfast None    Lunch None    Dinner May snack on veggies, protein (boiled egg, beef jerky) or may have a larger meal, such as burger w/o bun, chicken, fish, pork, asparagus, veggies, or salad   Snacks Unsalted pistachios, celery and spreadable cheese    Beverages Water, coffee, occasional diet dew and sf monster   Alcohol NA beer <1/week    Dining out 1x/month      Per H & P, \"Presents for decompensated heart failure. Medical history significant for NICM " "EF 20% c/b VT s/p CRT-D s/p HMII LVAD 6/19/2017 originally intended as destination therapy 2/2 obesity, recent weight loss, atrial fibrillation s/p DCCV on amiodarone and warfarin, history of staph hominis bacteremia, CKD stage 3, COPD who presents for worsening dyspnea on exertion, lethargy.Patient reports that he has been having progressive difficulties doing things at home. He has very poor exertional capacity... poor functional status at home despite LVAD.\" No N/V, diarrhea, or constipation. H/o vitamin B12 and iron deficiencies. Chart indicates pt has a beer allergy (shortness of breath). Pt was ordered to take, noting, bumex, vitamin B12, insulin, KCl, and zinc PTA.      Per discussion with pt, his appetite was good PTA. He was eating adequately. States he tried Boost or Ensure PTA and stated it was ok. Per pt, does not take vitamin/minerals s/p bariatric surgery. States his vitamin B 12 labs have been WNL recently.    CURRENT NUTRITION ORDERS  Diet: 2 g Sodium  Intake/Tolerance: Good diet tolerance. Per nursing flowsheets (% intake), pt consistently consuming % with a good appetite. Intermittently may skip a meal. Per discussion with pt, good appetite currently. Notes food choices may affect oral intake, especially with LOS. His wife brought him in a meal within the last couple days. Per team, pt is interested in protein shakes.     LABS  Labs reviewed    MEDICATIONS  Medications reviewed    ANTHROPOMETRICS  Height: 172.7 cm (5' 8\")  Most Recent Weight: 98.2 kg (216 lb 8 oz)    IBW: 70 kg   BMI: Obesity Grade I BMI 30-34.9  Wt hx: 118.4 kg (2/13/20), 106.2 kg (8/7/20), 103.9 kg (11/13/20), 94.8 kg (1/12/21), 99.5 kg (2/8/21, admit), 100.2 kg (2/15/21), 98.2 kg (2/22) - Pt had intentionally been trying to lose wt for Tx. Also, fluid status changes and diuresis may affect weights.   Dosing Weight: 76 kg (adjusted, based on lowest wt so far this admission of 96.6 kg on 2/20)    ASSESSED NUTRITION NEEDS " "(for inpatient hospital stay)  Estimated Energy Needs: 0607-0212 kcals/day (20 - 25 kcals/kg)  Justification: Estimated needs with wt status. If/when post-op Tx, estimated needs will increase to 30-35 kcals/kg (rec the low end of this range).   Estimated Protein Needs:  grams protein/day (1.1 - 1.4 grams of pro/kg)  Justification: Increased needs with cardiac status, pending renal function. If/when post-op Tx, estimated needs will increase to 1.3-1.5+ g protein/kg (pending renal function).   Estimated Fluid Needs: ~5371-6694 mL/day (~20 - 25 mL/kg)  Justification: Estimated needs, or per provider pending fluid status    PHYSICAL FINDINGS/OTHER FINDINGS  See malnutrition section below.  GI: Formed, brown stool. Last stool on 2/21. Having zero to one stool per day.   WOC 2/19: \"Left knee wounds due to Trauma. Status: initial assessment. Partial thickness, concern for bleeding 2/2 anticoagulation therapy.\"    MALNUTRITION  % Intake: No decreased intake noted  % Weight Loss: Not meeting this criteria.     Subcutaneous Fat Loss: None observed, but difficult to assess with pt in the middle of eating lunch and body habitus  Muscle Loss: None observed, but difficult to assess with pt in the middle of eating lunch and body habitus  Fluid Accumulation/Edema: Does not meet criteria  Malnutrition Diagnosis: Patient does not meet two of the established criteria necessary for diagnosing malnutrition    NUTRITION DIAGNOSIS  Predicted inadequate nutrient intake (protein-energy) related to food choices and LOS.     INTERVENTIONS  Implementation  Nutrition Education: Encouraged oral intake. Gave room service menus for sodium, carbohydrates, and protein.   Medical food supplement therapy: Gave oral supplement menu. Pt may request oral supplements prn. Placed prn snack/supplement order.   Modify composition of meals/snacks: Placed order for two string cheeses at 14:00. Placed order to allow pt to go over meal restrictions as long " as not going over daily restrictions.  Collaboration with other providers: Discussed pt with team. Notified of vitamin/minerals to order if pt agreeable, and labs.     Goals  Patient to consume % of nutritionally adequate meal trays TID, or the equivalent with supplements/snacks.     Monitoring/Evaluation  Progress toward goals will be monitored and evaluated per protocol.       Nutrition will continue to follow.      Sana Costello, MS, RD, LD, [facility]   6C Pgr: [phone]

## 2021-02-22 NOTE — PROGRESS NOTES
The patient's HeartMate LVAD was interrogated 2/22/2021  * Speed 5600 rpm   * Pulsatility index 5.0  * Power 7.0 Manuel   * Flow 7.0 L/minute   Fluid status: mildly hypervolemic   Alarms were reviewed, and notable for frequent PI events with occasional speed drops, history goes back less than one day  The driveline exit site was inspected, c/d/i.   All external components were inspected and showed no evidence of damage or malfunction, none replaced.   No changes to VAD settings made

## 2021-02-22 NOTE — PROGRESS NOTES
[facility]   Cardiology II Service / Advanced Heart Failure  Daily Progress Note        Patient: Jim Willingham  MRN: [id number]  Admission Date: 2/8/2021  Hospital Day # 14    Assessment and Plan: Jim Willingham is a 65 year old male with history of NICM EF 20% c/b VT s/p CRT-D s/p HMII LVAD 6/19/2017 originally intended as destination therapy 2/2 obesity however with recent weight loss now candidate for transplantation. He is admitted for worsening functional status and renal function concerning for worsening heart failure. He is now listed status 2E for transplant, extension due 2/22/21.    Changes today  - Continue current diuretics  - Decreased PRN dilaudid to 1-2 q4h, tomorrow will decrease to 1 mg q4h PRN, then stop, patient aware.  - 2E extention due 2/22/21 (message sent to coordinators).   - S/p three doses of anakrina 200 mg x3. Per rheumatology will add two more doses (final dose will be 2/23)  - Daily colchicine contraindicated in the setting of amiodarone     Chronic systolic heart failure secondary to NICM s/p HM II LVAD. Echo 1/8/21 at 9400rpm, EF<30%m LVIDd 6.8cm, at least mildly reduced RV function, AoV closed without AI, mild-mod eccentric MR, dilated IVC without collapse.   Stage D, NYHA Class IIIB  ACEi/ARB contraindicated due to renal dysfunction. Hydralazine 75 mg po TID. Isordil 10 mg po TID.   BB contraindicated due to low cardiac output  Aldosterone antagonist contraindicated due to renal dysfunction  SCD prophylaxis CRT-D  Fluid status Euvolemic. Continue bumex 4 mg IV BID.   MAP: Goal 65-85, has been within goal  LDH: 298 2/21/21  Anticoagulation: Coumadin per pharmacy. INR- 2.84, goal 2-3.   Antiplatelet: ASA 81 mg po daily  - Remains on the cardiac transplant list status 2E.     Non-traumatic Fall, in the setting of oxycodone  - Wean dilaudid- 1-2 mg q4 h on 2/22, plan for 1 mg q4h PRN on 2/23, off 2/24, patient aware  - Appreciate PT/OT consult.     Hyponatremia, mild,  . Asymptomatic  - Continue to trend with daily BMP     Carpel tunnel, bilateral s/p bilateral release. Evidence of thenar atrophy. Relief with steroid injection 11/20. Carpal tunnel release 2/18/21.  - Appreciate Ortho/Plastics consult.  - Robaxin 500 mg po TID prn.   - Ice prn.   - Wean dilaudid- 1-2 mg q4 h on 2/22, plan for 1 mg q4h PRN on 2/23, off 2/24, patient aware, per surgery would expect opioids to be off 3 days post-op  - Scheduled Tylenol 650 mg po QID.   - Gabapentin 300 mg in AM, 300 mg in the afternoon, and 600 mg in the evening.      WALTER on CKD Stage III. Cr peaked at 2.22  - Cr-1.6653 (1.53).      History of Afib. History of VT/VF. CHADSVASC-5.   - Continue Amiodarone 200 mg po daily.   - Coumadin as above.      Acute Gout. Minimal improvement with Colchicine times one.   - Appreciate Rheumatology consult.   - Continue allopurinol 300 mg daily  - S/p Anakinra 100 mg subcutaneous daily for 3 days.      Rhinovirus, resolved. Bacterial bronchitis. Completed 5 day course of Cefdinir. COVID negative 2/8. Resp PCR +rhinovirus. CXR 2/12 with no overt PNA. Stopped cefdinir 2/15. Cleared for transplant per ID.   - repeat RVP-3/5 per transplant ID  - awaiting infection control respons re: reswabbing sooner to clear precautions     Chronic/resolved conditions  DM type II, controlled: Hgb A1C-6.2. Glargine stopped per patient request on 2/14/21, medium dose Novolog sliding scale insulin.  COPD/Asthma: pta Breo Ellipta, albuterol prn.   Depression: pta Bupropion  Right sided weakness, resolved. CT head negative for acute findings. Appreciate Neuro eval.   Staph Hominis Bacteremia. No need for surveillance blood cx per transplant ID.       FEN: 2 gram sodium diet   PROPHY:  Coumadin  LINES:  PIV   DISPO:  TBD pending cardiac transplant.   CODE STATUS: Full Code   ================================================================    Interval History/ROS: His wrist pain continues to improve. It is currently a  "6/10. He mostly notes burning of his b/l hands which is a bit worse today. Improved function already in both hands.. He denies fever, chills, dizziness, chest pain, palpitations, cough, RESENDIZ, nausea, vomiting, diarrhea, melena, hematochezia. He does have a small amount of LE edema today. He is tolerating oral intake. He will continue to work with therapy.    Last 24 hr care team notes reviewed.   ROS:  4 point ROS including Respiratory, CV, GI and , other than that noted in the HPI, is negative.     Medications: Reviewed in EPIC.     Physical Exam:   BP (!) 88/75 (BP Location: Left arm)   Pulse 62   Temp 98.1  F (36.7  C) (Oral)   Resp 16   Ht 1.727 m (5' 8\")   Wt 98.2 kg (216 lb 8 oz)   SpO2 96%   BMI 32.92 kg/m    GENERAL: Appears alert and interacting appropriatly.  HEENT: Eye symmetrical and free of discharge bilaterally. Mucous membranes moist and without lesions.  NECK: Supple and without lymphadenopathy. JVD 8-9 cm.   CV: RRR, S1S2 present with LVAD hum.   RESPIRATORY: Respirations regular, even, and unlabored. Lungs CTA throughout.   GI: Soft and non distended with normoactive bowel sounds present in all quadrants. No tenderness, rebound, guarding. No organomegaly.   EXTREMITIES: Trace bilateral LE peripheral edema, unchanged  from yesterday's exam.   NEUROLOGIC: Alert and orientated interacting appropriatly. No focal deficits.   MUSCULOSKELETAL: No joint swelling or tenderness. Improved mobility in b/l hands.  SKIN: No jaundice. No rashes or lesions. LVAD drive line covered.     Data:  CMP  Recent Labs   Lab 02/22/21  0604 02/21/21  0535 02/20/21  0626 02/19/21  0545 02/16/21  0859 02/16/21  0859 02/16/21  0532    132* 135 134   < >  --  132*   POTASSIUM 3.8 3.8 3.8 3.6   < >  --  4.2   CHLORIDE 100 98 101 100   < >  --  97   CO2 26 29 28 26   < >  --  31   ANIONGAP 8 6 6 9   < >  --  3   GLC 89 98 117* 108*   < >  --  116*   BUN 34* 33* 33* 42*   < >  --  36*   CR 1.66* 1.53* 1.53* 1.58*   < " >  --  1.63*   GFRESTIMATED 43* 47* 47* 46*   < >  --  44*   GFRESTBLACK 50* 55* 55* 53*   < >  --  51*   QAMAR 8.6 8.7 8.9 8.8   < >  --  8.8   MAG 2.3 2.2 2.2 2.4*   < >  --  2.4*   PROTTOTAL  --   --   --   --   --  6.8 6.6*   ALBUMIN  --   --   --   --   --  3.3* 3.2*   BILITOTAL  --   --   --   --   --  0.7 0.6   ALKPHOS  --   --   --   --   --  104 99   AST  --   --   --   --   --  38 34   ALT  --   --   --   --   --  39 39    < > = values in this interval not displayed.     CBC  Recent Labs   Lab 02/21/21  0535 02/19/21  0545 02/17/21  0527   WBC 5.1 7.0 6.5   RBC 4.07* 3.81* 3.53*   HGB 11.2* 10.7* 9.9*   HCT 37.0* 34.3* 31.8*   MCV 91 90 90   MCH 27.5 28.1 28.0   MCHC 30.3* 31.2* 31.1*   RDW 18.8* 19.5* 19.6*    251 212     INR  Recent Labs   Lab 02/22/21  0604 02/21/21  0535 02/20/21  0626 02/19/21  0545   INR 2.84* 2.38* 2.30* 2.01*       Patient discussed with Dr. Conte.      Didi Butler, PA-C  [facility] Cardiology

## 2021-02-23 ENCOUNTER — APPOINTMENT (OUTPATIENT)
Dept: OCCUPATIONAL THERAPY | Facility: CLINIC | Age: 66
DRG: 001 | End: 2021-02-23
Attending: INTERNAL MEDICINE
Payer: COMMERCIAL

## 2021-02-23 LAB
ALBUMIN SERPL-MCNC: 3.3 G/DL (ref 3.4–5)
ALP SERPL-CCNC: 96 U/L (ref 40–150)
ALT SERPL W P-5'-P-CCNC: 33 U/L (ref 0–70)
ANION GAP SERPL CALCULATED.3IONS-SCNC: 5 MMOL/L (ref 3–14)
AST SERPL W P-5'-P-CCNC: 32 U/L (ref 0–45)
BILIRUB DIRECT SERPL-MCNC: 0.2 MG/DL (ref 0–0.2)
BILIRUB SERPL-MCNC: 0.6 MG/DL (ref 0.2–1.3)
BUN SERPL-MCNC: 36 MG/DL (ref 7–30)
CALCIUM SERPL-MCNC: 8.6 MG/DL (ref 8.5–10.1)
CHLORIDE SERPL-SCNC: 102 MMOL/L (ref 94–109)
CO2 SERPL-SCNC: 28 MMOL/L (ref 20–32)
CREAT SERPL-MCNC: 1.68 MG/DL (ref 0.66–1.25)
ERYTHROCYTE [DISTWIDTH] IN BLOOD BY AUTOMATED COUNT: 19 % (ref 10–15)
GFR SERPL CREATININE-BSD FRML MDRD: 42 ML/MIN/{1.73_M2}
GLUCOSE BLDC GLUCOMTR-MCNC: 103 MG/DL (ref 70–99)
GLUCOSE BLDC GLUCOMTR-MCNC: 109 MG/DL (ref 70–99)
GLUCOSE BLDC GLUCOMTR-MCNC: 117 MG/DL (ref 70–99)
GLUCOSE BLDC GLUCOMTR-MCNC: 136 MG/DL (ref 70–99)
GLUCOSE BLDC GLUCOMTR-MCNC: 138 MG/DL (ref 70–99)
GLUCOSE BLDC GLUCOMTR-MCNC: 152 MG/DL (ref 70–99)
GLUCOSE BLDC GLUCOMTR-MCNC: 188 MG/DL (ref 70–99)
GLUCOSE BLDC GLUCOMTR-MCNC: 58 MG/DL (ref 70–99)
GLUCOSE SERPL-MCNC: 101 MG/DL (ref 70–99)
HCT VFR BLD AUTO: 34.7 % (ref 40–53)
HGB BLD-MCNC: 10.8 G/DL (ref 13.3–17.7)
INR PPP: 2.74 (ref 0.86–1.14)
LDH SERPL L TO P-CCNC: 268 U/L (ref 85–227)
MAGNESIUM SERPL-MCNC: 2.3 MG/DL (ref 1.6–2.3)
MCH RBC QN AUTO: 28.1 PG (ref 26.5–33)
MCHC RBC AUTO-ENTMCNC: 31.1 G/DL (ref 31.5–36.5)
MCV RBC AUTO: 90 FL (ref 78–100)
PLATELET # BLD AUTO: 268 10E9/L (ref 150–450)
POTASSIUM SERPL-SCNC: 3.8 MMOL/L (ref 3.4–5.3)
PROT SERPL-MCNC: 6.4 G/DL (ref 6.8–8.8)
RBC # BLD AUTO: 3.85 10E12/L (ref 4.4–5.9)
SODIUM SERPL-SCNC: 135 MMOL/L (ref 133–144)
WBC # BLD AUTO: 5.9 10E9/L (ref 4–11)

## 2021-02-23 PROCEDURE — 250N000013 HC RX MED GY IP 250 OP 250 PS 637: Performed by: PHYSICIAN ASSISTANT

## 2021-02-23 PROCEDURE — 999N001017 HC STATISTIC GLUCOSE BY METER IP

## 2021-02-23 PROCEDURE — 85027 COMPLETE CBC AUTOMATED: CPT | Performed by: STUDENT IN AN ORGANIZED HEALTH CARE EDUCATION/TRAINING PROGRAM

## 2021-02-23 PROCEDURE — 99232 SBSQ HOSP IP/OBS MODERATE 35: CPT | Mod: 25 | Performed by: NURSE PRACTITIONER

## 2021-02-23 PROCEDURE — 93750 INTERROGATION VAD IN PERSON: CPT | Performed by: NURSE PRACTITIONER

## 2021-02-23 PROCEDURE — 85610 PROTHROMBIN TIME: CPT | Performed by: STUDENT IN AN ORGANIZED HEALTH CARE EDUCATION/TRAINING PROGRAM

## 2021-02-23 PROCEDURE — 83615 LACTATE (LD) (LDH) ENZYME: CPT | Performed by: STUDENT IN AN ORGANIZED HEALTH CARE EDUCATION/TRAINING PROGRAM

## 2021-02-23 PROCEDURE — 272N000001 HC OR GENERAL SUPPLY STERILE: Performed by: INTERNAL MEDICINE

## 2021-02-23 PROCEDURE — 99233 SBSQ HOSP IP/OBS HIGH 50: CPT | Mod: GC | Performed by: INTERNAL MEDICINE

## 2021-02-23 PROCEDURE — 250N000009 HC RX 250: Performed by: INTERNAL MEDICINE

## 2021-02-23 PROCEDURE — 250N000009 HC RX 250: Performed by: PHYSICIAN ASSISTANT

## 2021-02-23 PROCEDURE — 36415 COLL VENOUS BLD VENIPUNCTURE: CPT | Performed by: STUDENT IN AN ORGANIZED HEALTH CARE EDUCATION/TRAINING PROGRAM

## 2021-02-23 PROCEDURE — 97110 THERAPEUTIC EXERCISES: CPT | Mod: GO

## 2021-02-23 PROCEDURE — 250N000013 HC RX MED GY IP 250 OP 250 PS 637: Performed by: NURSE PRACTITIONER

## 2021-02-23 PROCEDURE — 4A023N6 MEASUREMENT OF CARDIAC SAMPLING AND PRESSURE, RIGHT HEART, PERCUTANEOUS APPROACH: ICD-10-PCS | Performed by: INTERNAL MEDICINE

## 2021-02-23 PROCEDURE — 93451 RIGHT HEART CATH: CPT | Performed by: INTERNAL MEDICINE

## 2021-02-23 PROCEDURE — 214N000001 HC R&B CCU UMMC

## 2021-02-23 PROCEDURE — 250N000013 HC RX MED GY IP 250 OP 250 PS 637: Performed by: STUDENT IN AN ORGANIZED HEALTH CARE EDUCATION/TRAINING PROGRAM

## 2021-02-23 PROCEDURE — 250N000009 HC RX 250: Performed by: NURSE PRACTITIONER

## 2021-02-23 PROCEDURE — 250N000013 HC RX MED GY IP 250 OP 250 PS 637: Performed by: INTERNAL MEDICINE

## 2021-02-23 PROCEDURE — 80076 HEPATIC FUNCTION PANEL: CPT | Performed by: STUDENT IN AN ORGANIZED HEALTH CARE EDUCATION/TRAINING PROGRAM

## 2021-02-23 PROCEDURE — 97535 SELF CARE MNGMENT TRAINING: CPT | Mod: GO

## 2021-02-23 PROCEDURE — 80048 BASIC METABOLIC PNL TOTAL CA: CPT | Performed by: STUDENT IN AN ORGANIZED HEALTH CARE EDUCATION/TRAINING PROGRAM

## 2021-02-23 PROCEDURE — 83735 ASSAY OF MAGNESIUM: CPT | Performed by: STUDENT IN AN ORGANIZED HEALTH CARE EDUCATION/TRAINING PROGRAM

## 2021-02-23 RX ORDER — GUAIFENESIN 600 MG/1
600 TABLET, EXTENDED RELEASE ORAL 2 TIMES DAILY PRN
Status: DISCONTINUED | OUTPATIENT
Start: 2021-02-23 | End: 2021-03-14

## 2021-02-23 RX ORDER — LIDOCAINE 40 MG/G
CREAM TOPICAL
Status: DISCONTINUED | OUTPATIENT
Start: 2021-02-23 | End: 2021-02-23

## 2021-02-23 RX ORDER — LIDOCAINE HYDROCHLORIDE 20 MG/ML
1 JELLY TOPICAL
Status: COMPLETED | OUTPATIENT
Start: 2021-02-23 | End: 2021-02-23

## 2021-02-23 RX ORDER — WARFARIN SODIUM 5 MG/1
5 TABLET ORAL
Status: COMPLETED | OUTPATIENT
Start: 2021-02-23 | End: 2021-02-23

## 2021-02-23 RX ORDER — GABAPENTIN 300 MG/1
300 CAPSULE ORAL DAILY
Status: DISCONTINUED | OUTPATIENT
Start: 2021-02-24 | End: 2021-05-25

## 2021-02-23 RX ORDER — GABAPENTIN 300 MG/1
600 CAPSULE ORAL 2 TIMES DAILY
Status: DISCONTINUED | OUTPATIENT
Start: 2021-02-23 | End: 2021-05-25

## 2021-02-23 RX ADMIN — ACETAMINOPHEN 650 MG: 325 TABLET, FILM COATED ORAL at 16:25

## 2021-02-23 RX ADMIN — GABAPENTIN 600 MG: 300 CAPSULE ORAL at 14:17

## 2021-02-23 RX ADMIN — Medication 2 MG: at 08:19

## 2021-02-23 RX ADMIN — ASPIRIN 81 MG CHEWABLE TABLET 81 MG: 81 TABLET CHEWABLE at 08:11

## 2021-02-23 RX ADMIN — Medication 1 MG: at 14:17

## 2021-02-23 RX ADMIN — UMECLIDINIUM 1 PUFF: 62.5 AEROSOL, POWDER ORAL at 08:21

## 2021-02-23 RX ADMIN — LIDOCAINE HYDROCHLORIDE 1 ML: 20 JELLY TOPICAL at 16:22

## 2021-02-23 RX ADMIN — Medication 10 MG: at 22:08

## 2021-02-23 RX ADMIN — GUAIFENESIN 600 MG: 600 TABLET ORAL at 08:12

## 2021-02-23 RX ADMIN — ISOSORBIDE DINITRATE 10 MG: 10 TABLET ORAL at 19:44

## 2021-02-23 RX ADMIN — ACETAMINOPHEN 650 MG: 325 TABLET, FILM COATED ORAL at 20:42

## 2021-02-23 RX ADMIN — BUMETANIDE 3 MG: 2 TABLET ORAL at 16:21

## 2021-02-23 RX ADMIN — AMIODARONE HYDROCHLORIDE 200 MG: 200 TABLET ORAL at 08:11

## 2021-02-23 RX ADMIN — HYDRALAZINE HYDROCHLORIDE 75 MG: 25 TABLET, FILM COATED ORAL at 08:11

## 2021-02-23 RX ADMIN — ISOSORBIDE DINITRATE 10 MG: 10 TABLET ORAL at 14:17

## 2021-02-23 RX ADMIN — METHOCARBAMOL 500 MG: 500 TABLET, FILM COATED ORAL at 08:19

## 2021-02-23 RX ADMIN — GABAPENTIN 600 MG: 300 CAPSULE ORAL at 21:02

## 2021-02-23 RX ADMIN — MONTELUKAST 10 MG: 10 TABLET, FILM COATED ORAL at 21:02

## 2021-02-23 RX ADMIN — GABAPENTIN 300 MG: 300 CAPSULE ORAL at 08:12

## 2021-02-23 RX ADMIN — BUMETANIDE 4 MG: 2 TABLET ORAL at 08:11

## 2021-02-23 RX ADMIN — WARFARIN SODIUM 5 MG: 5 TABLET ORAL at 18:07

## 2021-02-23 RX ADMIN — FLUTICASONE FUROATE AND VILANTEROL TRIFENATATE 1 PUFF: 100; 25 POWDER RESPIRATORY (INHALATION) at 08:21

## 2021-02-23 RX ADMIN — ISOSORBIDE DINITRATE 10 MG: 10 TABLET ORAL at 08:12

## 2021-02-23 RX ADMIN — POTASSIUM CHLORIDE 60 MEQ: 750 TABLET, EXTENDED RELEASE ORAL at 08:11

## 2021-02-23 RX ADMIN — HYDRALAZINE HYDROCHLORIDE 75 MG: 25 TABLET, FILM COATED ORAL at 19:45

## 2021-02-23 RX ADMIN — ALLOPURINOL 300 MG: 300 TABLET ORAL at 08:12

## 2021-02-23 RX ADMIN — BUPROPION HYDROCHLORIDE 150 MG: 150 TABLET, EXTENDED RELEASE ORAL at 08:11

## 2021-02-23 RX ADMIN — HYDRALAZINE HYDROCHLORIDE 75 MG: 25 TABLET, FILM COATED ORAL at 14:16

## 2021-02-23 RX ADMIN — Medication 1 MG: at 21:02

## 2021-02-23 RX ADMIN — METHOCARBAMOL 500 MG: 500 TABLET, FILM COATED ORAL at 20:43

## 2021-02-23 RX ADMIN — ANAKINRA 100 MG: 100 INJECTION, SOLUTION SUBCUTANEOUS at 22:09

## 2021-02-23 RX ADMIN — ACETAMINOPHEN 650 MG: 325 TABLET, FILM COATED ORAL at 08:19

## 2021-02-23 ASSESSMENT — MIFFLIN-ST. JEOR: SCORE: 1748.82

## 2021-02-23 ASSESSMENT — ACTIVITIES OF DAILY LIVING (ADL)
ADLS_ACUITY_SCORE: 12

## 2021-02-23 NOTE — PROGRESS NOTES
[facility] RHEUMATOLOGY PROGRESS NOTE    Jim Willingham ([id number]) admitted on 2/8/2021 02/23/2021    Assessment & Plan   66yo male with pertinent PMH of gout (followed by Dr. Pettit) who was admitted with worsening RESENDIZ in the context of his known NICM EF 20% s/p LVAD undergoing evaluation and work-up for transplant.     He had his Prednisone (for which he takes for paradoxical gout flare) discontinued on admission due to history of Staph hominis bacteremia (09/2020) and possible upcoming transplant. He subsequently developed polyarticular gout flare and Rheumatology was consulted. He underwent bilateral carpal tunnel release on 2/18 and was started on 100mg Anakinra daily on 2/18. Today (2/23) will be the 5th and final day of treatment with Anakinra. The patient has improved overall from a gout standpoint.     He is currently on 300mg of allopurinol without any paradoxical flare prophylaxis. Uric acid goal <5 only reached on 2/1/21. He should still be on paradoxical flare prophylaxis for another 4-6 months. However, Prednisone/ NSAIDs are not congruent with his cardiac/renal disease. And Colchicine is contraindicated with concurrent Amiodarone administration. Therefore, there is unfortunately no flare prophylaxis that can be safely administered at this time with the exception of daily anakinra or monthly canakinumab. While these are options, may be best to be vigilant and instead use anakinra at first sign of gout flare.     Recommendations:  1) Anakinra 100mg subcutaneous (day 5/5). Could consider re-dosing if he develops a subsequent flare during hospital stay.   2) Continue Allopurinol 300mg daily  3) Will continue to follow peripherally, please re-engage if Mr Willingham develops another episodes of acute joint pain    Seen and discussed with  who agrees with above assessment and plan.    Rajani Mantilla,    PGY-3, Internal Medicine  [phone]    Staff addendum  I  "performed the history and physical examination of the patient and discussed the management with the resident. I reviewed the available lab and imaging studies. I reviewed the resident s note and agree with the documented findings and plan of care.    Elbert Pettit MD  Rheumatology      Subjective     Nursing note was reviewed, no acute event overnight. The patient states he continues to have bilateral hand pain that is worse with any type of flexion in his fingers. Seems to be mainly located over incisions bilaterally, though states the pain is worse in his left hand than right hand which is where his gout is located. However, he does think pain related to gout has improved overall.     14 point ROS negative except above    Objective   Most recent vital signs:  BP 92/82   Pulse 65   Temp 97.7  F (36.5  C) (Oral)   Resp 16   Ht 1.727 m (5' 8\")   Wt 97.9 kg (215 lb 14.4 oz)   SpO2 98%   BMI 32.83 kg/m    Temp:  [97.7  F (36.5  C)-98.4  F (36.9  C)] 97.7  F (36.5  C)  Pulse:  [65-75] 65  Resp:  [16-20] 16  BP: ()/(70-86) 92/82  SpO2:  [96 %-98 %] 98 %  Wt Readings from Last 2 Encounters:   02/23/21 97.9 kg (215 lb 14.4 oz)   02/05/21 98.3 kg (216 lb 11.2 oz)       Intake/Output Summary (Last 24 hours) at 2/23/2021 0942  Last data filed at 2/23/2021 0700  Gross per 24 hour   Intake 1300 ml   Output 2950 ml   Net -1650 ml       Physical exam:  GEN: sitting up in bed, pleasant   HEENT: no facial rash, sclera sclear  CV: LVAD in place   Pulm: breathing comfortably on room air, no cough   Abdomen: not distended  MSK: able to make a fist on the left, though barely able to touch fingertips to palm. Still with swelling and mild erythema over the MCPs of the 2nd-5th left hand. Right hand without erythema/edema. Bilateral palm with well-healing incisions.   Skin: no acute cutaneous lesions appreciated  Psych: pleasant, interactive, appropriate    Labs and imaging were reviewed in Epic  "

## 2021-02-23 NOTE — PROGRESS NOTES
[facility]   Cardiology II Service / Advanced Heart Failure  Daily Progress Note      Patient: Jim Willingham  MRN: [id number]  Admission Date: 2/8/2021  Hospital Day # 15    Assessment and Plan: Jim Willingham is a 65 year old male with history of NICM EF 20% c/b VT s/p CRT-D s/p HMII LVAD 6/19/2017 originally intended as destination therapy 2/2 obesity however with recent weight loss now candidate for transplantation. He is admitted for worsening functional status and renal function concerning for worsening heart failure. He is now listed status 2E for transplant, extension due 3/9/21.    Today's Plan:  -wean dilaudid as previously discussed  -will discuss what to expect as far as nerve pain with ortho today ?increase gabapentin   -make Muccinex prn  -decrease Bumex to 3 mg bid    #Chronic systolic heart failure 2/2 NICM (EF 20%)   #s/p HM II LVAD placement (6/19/17) as DT (obesity)  #Listed status 2E for transplant due to multiple LVAD complications  Stage D. NYHA class III. TTE 1/8/21 at 9400rpm, EF<30%m LVIDd 6.8cm, at least mildly reduced RV function, AoV closed without AI, mild-mod eccentric MR, dilated IVC without collapse.      Fluid status:RA 8 PCWP 6 today, bumetanide decrease 3 mg bid  BB: deferred due to recent decompensations and low cardiac output  ACEi/ARB/Afterload reduction: hydralazine 75 TID, isosorbide dinitrate 10 mg TID   Antiplatelet: ASA 81 mg   Anticoagulation: warfarin dosing per pharmacy, INR goal 2-3, INR 2.7 today  SCD: dual chamber ICD  MAP: at goal  LDH: stable, 368                #Carpal tunnel, bilateral R>L s/p surgical bilateral release   Worsening symptoms; significant numbness and pain. E/o thenar atrophy. Note: paraproteins normal in the past, defer further amyloid workup. S/p bilateral surgical release with ortho 2/18.  - weaning dilaudid 1-2 mg q4hr prn  - gabapentin 300, 300, 600 mg at HS  - robaxin 500 mg po TID prn   - scheduled APAP  - will discuss expected  "pain course with ortho today     #Polyarticular gout flare  - rheum following, appreciate recs  - Anakinra subcutaneous x5 days, could redose if subsequent flare  - continue allopurinol 300 mg daily    # Non-traumatic Fall, in the setting of oxycodone  - weaning dilaudid- 1-2 mg q4 h on 2/22, plan for 1 mg q4h PRN on 2/23, off 2/24, patient aware  - PT/OT consulted    Resolve/chronic issues:  #Hx staph hominis bacteremia  Cleared, no concerns for active infection. Off abx since fall 2020 without symptoms of recurrence.   - no need for surveillance blood cx per transplant ID     #Recurrent bronchitis  #Rhinovirus  Started on cefdinir 2/5 plan was for 10 d course (he has does this course frequently in the past). COVID negative 2/8. Worsening rhinorrhea and congestion 2/12. Resp PCR +rhinovirus. CXR 2/12 with atelectasis, no over PNA. Stopped cefdinir 2/15.   - Muccinex made prn  - repeat RVP ~3/5 per transplant ID     #WALTER on CKD, improved  Admission Cr. 2-2.2. Improved with decreased Bumex as outpatient. Cr now stable 1.6-1.7  - daily BMP     #H/o atrial fibrillation on warfarin  #H/o VT/VF on amiodarone  Currently in NSR.   - continue warfarin as above  - continue amiodarone 200 mg daily (decreased per Dr Knott note from 2/5 given transplant listing)    #DM type II: glargine stopped per patient request, medium dose insulin sliding scale  #COPD/Asthma: pta Breo Ellipta, albuterol prn  #Depression: pta Bupropion     Diet: 2g Na 2L FR  DVT Prophylaxis: warfarin  Code Status: full code  Lines: DAWSON Wilder DNP, NP-C  Advanced Heart Failure/Cardiology II Service  Pager [phone] [facility] [id number]    ================================================================    Subjective/24-Hr Events:   Last 24 hr care team notes reviewed. No overnight events. More pain today in his hand, left now worse than right. Pain feels \"nerve like\". Denies orthopnea, PND, LE edema.      ROS:  4 point ROS including respiratory, CV, GI " "and  (other than that noted in the HPI) is negative.     Medications: Reviewed in EPIC.     Physical Exam:   BP (!) 88/76 (BP Location: Right arm)   Pulse 69   Temp 98.4  F (36.9  C) (Oral)   Resp 16   Ht 1.727 m (5' 8\")   Wt 97.9 kg (215 lb 14.4 oz)   SpO2 98%   BMI 32.83 kg/m      GENERAL: Appears comfortable, in no distress.  HEENT: Eye symmetrical, no discharge or icterus bilaterally. Mucous membranes moist and without lesions.  NECK: Supple, JVD not visible at 75 degrees.   CV: +mechanical LVAD hum.  RESPIRATORY: Respirations regular, even, and unlabored. Lungs CTA throughout.    GI: Soft and non distended with normoactive bowel sounds present in all quadrants. No tenderness, rebound, guarding.   EXTREMITIES: No peripheral edema. Non pulsatile.   NEUROLOGIC: Alert and oriented x 3. No focal deficits.   MUSCULOSKELETAL: No joint swelling or tenderness.   SKIN: No jaundice. No rashes or lesions.     Labs:  CMP  Recent Labs   Lab 02/23/21  0614 02/22/21  0604 02/21/21  0535 02/20/21  0626    134 132* 135   POTASSIUM 3.8 3.8 3.8 3.8   CHLORIDE 102 100 98 101   CO2 28 26 29 28   ANIONGAP 5 8 6 6   * 89 98 117*   BUN 36* 34* 33* 33*   CR 1.68* 1.66* 1.53* 1.53*   GFRESTIMATED 42* 43* 47* 47*   GFRESTBLACK 49* 50* 55* 55*   QAMAR 8.6 8.6 8.7 8.9   MAG 2.3 2.3 2.2 2.2   PROTTOTAL 6.4*  --   --   --    ALBUMIN 3.3*  --   --   --    BILITOTAL 0.6  --   --   --    ALKPHOS 96  --   --   --    AST 32  --   --   --    ALT 33  --   --   --        CBC  Recent Labs   Lab 02/23/21  0614 02/21/21  0535 02/19/21  0545 02/17/21  0527   WBC 5.9 5.1 7.0 6.5   RBC 3.85* 4.07* 3.81* 3.53*   HGB 10.8* 11.2* 10.7* 9.9*   HCT 34.7* 37.0* 34.3* 31.8*   MCV 90 91 90 90   MCH 28.1 27.5 28.1 28.0   MCHC 31.1* 30.3* 31.2* 31.1*   RDW 19.0* 18.8* 19.5* 19.6*    266 251 212       INR  Recent Labs   Lab 02/23/21  0614 02/22/21  0604 02/21/21  0535 02/20/21  0626   INR 2.74* 2.84* 2.38* 2.30*       Time/Communication  I " personally spent a total of 25 minutes. Of that 15 minutes was counseling/coordination of patient's care. Plan of care discussed with patient. See my note above for details.    Patient discussed with Dr. Conte.

## 2021-02-23 NOTE — PLAN OF CARE
D: Pt admit 2/8/21 for heart transplant status 2E. PMH NICM EF 20% c/b VT s/p CRTD s/p HM2 LVAD 6/19/2017. He is s/p carpal tunnel surgery 2/18 and has orders to not lift > 2lb per hand for 2 weeks.    I: Monitored vitals and assessed pt status.   Changed: Right heart cath completed this AM through right internal jugular, sites have been soft, vitals stable. Dilaudid dose decreased, Gabapentin dose increased.   PRN: Tylenol x2, Dilaudid x2, Robaxin x1, all somewhat effecftive for pain management. Pt c/o a lot of pain in bilateral hands, Cards 2 updated and spoke with ortho to try and improve pain regimen. Lidocaine gel applied to bilateral fingertips, but pt stated it did not make much of a difference.   Tele: SR with a first degree block and occasional PVCs.   O2: Sating 90% on RA, denied shortness of breath.   Mobility: Able to ambulate with a stand by assist, overall steady.     A: A&O x4. Slept on and off throughout the day, stating he did not sleep well last night. Dressing changed to LVAD site, skin clean dry and intact.     P: Continue to monitor pt status and report changes to Cards 2 team.

## 2021-02-23 NOTE — PROCEDURES
The patient's HeartMate LVAD was interrogated 2/23/2021  * Speed 9600 rpm   * Pulsatility index 3.7-4.4   * Power 6.8 Manuel   * Flow 6.5-6.9 L/minute   Fluid status: euvolemic   Alarms were reviewed, and notable for several PI events with speed drops.   The driveline exit site was inspected, dressing cdi.   All external components were inspected and showed no evidence of damage or malfunction, none replaced.   No changes to VAD settings made

## 2021-02-23 NOTE — PLAN OF CARE
D: Pt admit 2/8/21 for heart transplant status 2E. PMH NICM EF 20% c/b VT s/p CRTD s/p HM2 LVAD 6/19/2017. He is s/p carpal tunnel surgery 2/18 and has orders to not lift > 2lb per hand for 2 weeks.    I: Monitored vitals and assessed pt status.   Changed:   -NPO at midnight for RHC.  Running:  -PIV saline locked.  PRN  -PO dilaudid and robaxin given for wrist pain.  -Melatonin given at HS.    A: A0x4. VSS, on room air. Sinus rhythm with 1st degree AVB on monitor with PVCs, rates 50s-90s. HM2 LVAD numbers WNL and without alarms. Dressing CDI. BP WNL. +2 edema in lower legs. Afebrile. Reports pain in bilateral wrists, at baseline. No BM this shift. Adequate urine output. Blood sugars ACHS/0200.  Dressing over left knee abrasion and bilateral wrists CDI. Appeared to sleep well between cares. Up with SBA.     Temp:  [97.8  F (36.6  C)-98.4  F (36.9  C)] 98.4  F (36.9  C)  Pulse:  [62-75] 69  Resp:  [16-20] 16  BP: ()/(70-86) 88/76  SpO2:  [96 %-98 %] 98 %      P: Anticipate RHC today. Continue to monitor Pt status and report changes to cards 2.

## 2021-02-23 NOTE — PROGRESS NOTES
Status 2 Heart Extension Complete  02/22/21  Listing Criteria:  Exception    Dr. Montgomery approved the following statement prior to the submission of Status 2 Exception form in [facility]:    65 M with NICM who had a HM3 implanted on 6/19/2017 at destination therapy (obesity). He initially thrived after surgery with significant functional improvement. Over the last 10 months, however, his functional status has deteriorated.   He has developed worsening heart failure symptoms associated with significant weight loss (50 lbs over the last year) with visible muscle wasting and worsening cardiorenal syndrome  (baseline cr 1.4 now close to 2.0). Last hemodynamics: RA18, PCWP 25, CI 2.0. While  hemodynamics improved with increased speed and increased afterload reduction, he continues to have extremely high diuretic requirements to maintain acceptable volume status which has resulted in worsening renal function on oral diuretics (note, no AI on echo and normal inflow and outflow velocities).   In addition to his poor hemodynamics, he has developed several other significant LVAD complications.  He developed recurrent bacteremia (staph hominis) requiring prolonged IV antibiotics (last positive culture 9/2020) currently on oral suppression.  He also recently had several episodes of sustained VT and finally therapy for VF on 12/11/2020.   As he is a large blood group O, we are asking for status 2 E given multiple LVAD complications and risk of losing his window of candidacy for heart transplantation.    Status 2 Extension due 03/09/21

## 2021-02-24 ENCOUNTER — APPOINTMENT (OUTPATIENT)
Dept: OCCUPATIONAL THERAPY | Facility: CLINIC | Age: 66
DRG: 001 | End: 2021-02-24
Attending: INTERNAL MEDICINE
Payer: COMMERCIAL

## 2021-02-24 ENCOUNTER — APPOINTMENT (OUTPATIENT)
Dept: PHYSICAL THERAPY | Facility: CLINIC | Age: 66
DRG: 001 | End: 2021-02-24
Attending: INTERNAL MEDICINE
Payer: COMMERCIAL

## 2021-02-24 LAB
ANION GAP SERPL CALCULATED.3IONS-SCNC: 7 MMOL/L (ref 3–14)
BUN SERPL-MCNC: 36 MG/DL (ref 7–30)
CALCIUM SERPL-MCNC: 8.7 MG/DL (ref 8.5–10.1)
CHLORIDE SERPL-SCNC: 103 MMOL/L (ref 94–109)
CO2 SERPL-SCNC: 26 MMOL/L (ref 20–32)
CREAT SERPL-MCNC: 1.91 MG/DL (ref 0.66–1.25)
GFR SERPL CREATININE-BSD FRML MDRD: 36 ML/MIN/{1.73_M2}
GLUCOSE BLDC GLUCOMTR-MCNC: 106 MG/DL (ref 70–99)
GLUCOSE BLDC GLUCOMTR-MCNC: 124 MG/DL (ref 70–99)
GLUCOSE BLDC GLUCOMTR-MCNC: 149 MG/DL (ref 70–99)
GLUCOSE BLDC GLUCOMTR-MCNC: 73 MG/DL (ref 70–99)
GLUCOSE BLDC GLUCOMTR-MCNC: 89 MG/DL (ref 70–99)
GLUCOSE BLDC GLUCOMTR-MCNC: 99 MG/DL (ref 70–99)
GLUCOSE SERPL-MCNC: 91 MG/DL (ref 70–99)
INR PPP: 2.51 (ref 0.86–1.14)
MAGNESIUM SERPL-MCNC: 2.3 MG/DL (ref 1.6–2.3)
POTASSIUM SERPL-SCNC: 3.6 MMOL/L (ref 3.4–5.3)
SODIUM SERPL-SCNC: 135 MMOL/L (ref 133–144)

## 2021-02-24 PROCEDURE — 250N000013 HC RX MED GY IP 250 OP 250 PS 637: Performed by: INTERNAL MEDICINE

## 2021-02-24 PROCEDURE — 83735 ASSAY OF MAGNESIUM: CPT | Performed by: STUDENT IN AN ORGANIZED HEALTH CARE EDUCATION/TRAINING PROGRAM

## 2021-02-24 PROCEDURE — 36415 COLL VENOUS BLD VENIPUNCTURE: CPT | Performed by: STUDENT IN AN ORGANIZED HEALTH CARE EDUCATION/TRAINING PROGRAM

## 2021-02-24 PROCEDURE — 97530 THERAPEUTIC ACTIVITIES: CPT | Mod: GP | Performed by: PHYSICAL THERAPIST

## 2021-02-24 PROCEDURE — 250N000013 HC RX MED GY IP 250 OP 250 PS 637: Performed by: STUDENT IN AN ORGANIZED HEALTH CARE EDUCATION/TRAINING PROGRAM

## 2021-02-24 PROCEDURE — 214N000001 HC R&B CCU UMMC

## 2021-02-24 PROCEDURE — 999N001017 HC STATISTIC GLUCOSE BY METER IP

## 2021-02-24 PROCEDURE — 250N000013 HC RX MED GY IP 250 OP 250 PS 637: Performed by: NURSE PRACTITIONER

## 2021-02-24 PROCEDURE — 99232 SBSQ HOSP IP/OBS MODERATE 35: CPT | Mod: 25 | Performed by: NURSE PRACTITIONER

## 2021-02-24 PROCEDURE — 97110 THERAPEUTIC EXERCISES: CPT | Mod: GO

## 2021-02-24 PROCEDURE — 85610 PROTHROMBIN TIME: CPT | Performed by: STUDENT IN AN ORGANIZED HEALTH CARE EDUCATION/TRAINING PROGRAM

## 2021-02-24 PROCEDURE — 80048 BASIC METABOLIC PNL TOTAL CA: CPT | Performed by: STUDENT IN AN ORGANIZED HEALTH CARE EDUCATION/TRAINING PROGRAM

## 2021-02-24 PROCEDURE — 93750 INTERROGATION VAD IN PERSON: CPT | Performed by: NURSE PRACTITIONER

## 2021-02-24 RX ORDER — WARFARIN SODIUM 7.5 MG/1
7.5 TABLET ORAL
Status: COMPLETED | OUTPATIENT
Start: 2021-02-24 | End: 2021-02-24

## 2021-02-24 RX ORDER — ACETAMINOPHEN 325 MG/1
975 TABLET ORAL EVERY 6 HOURS PRN
Status: DISCONTINUED | OUTPATIENT
Start: 2021-02-24 | End: 2021-02-25

## 2021-02-24 RX ADMIN — METHOCARBAMOL 500 MG: 500 TABLET, FILM COATED ORAL at 06:31

## 2021-02-24 RX ADMIN — HYDRALAZINE HYDROCHLORIDE 75 MG: 25 TABLET, FILM COATED ORAL at 19:40

## 2021-02-24 RX ADMIN — Medication 1 MG: at 06:31

## 2021-02-24 RX ADMIN — ACETAMINOPHEN 975 MG: 325 TABLET, FILM COATED ORAL at 19:41

## 2021-02-24 RX ADMIN — GABAPENTIN 600 MG: 300 CAPSULE ORAL at 16:11

## 2021-02-24 RX ADMIN — POTASSIUM CHLORIDE 60 MEQ: 750 TABLET, EXTENDED RELEASE ORAL at 08:52

## 2021-02-24 RX ADMIN — METHOCARBAMOL 500 MG: 500 TABLET, FILM COATED ORAL at 16:11

## 2021-02-24 RX ADMIN — ISOSORBIDE DINITRATE 10 MG: 10 TABLET ORAL at 19:41

## 2021-02-24 RX ADMIN — UMECLIDINIUM 1 PUFF: 62.5 AEROSOL, POWDER ORAL at 08:52

## 2021-02-24 RX ADMIN — ACETAMINOPHEN 975 MG: 325 TABLET, FILM COATED ORAL at 12:53

## 2021-02-24 RX ADMIN — ISOSORBIDE DINITRATE 10 MG: 10 TABLET ORAL at 08:52

## 2021-02-24 RX ADMIN — WARFARIN SODIUM 7.5 MG: 7.5 TABLET ORAL at 17:52

## 2021-02-24 RX ADMIN — METHOCARBAMOL 500 MG: 500 TABLET, FILM COATED ORAL at 23:58

## 2021-02-24 RX ADMIN — ACETAMINOPHEN 650 MG: 325 TABLET, FILM COATED ORAL at 06:31

## 2021-02-24 RX ADMIN — MONTELUKAST 10 MG: 10 TABLET, FILM COATED ORAL at 21:05

## 2021-02-24 RX ADMIN — BUPROPION HYDROCHLORIDE 150 MG: 150 TABLET, EXTENDED RELEASE ORAL at 08:52

## 2021-02-24 RX ADMIN — ASPIRIN 81 MG CHEWABLE TABLET 81 MG: 81 TABLET CHEWABLE at 08:51

## 2021-02-24 RX ADMIN — GABAPENTIN 600 MG: 300 CAPSULE ORAL at 21:05

## 2021-02-24 RX ADMIN — AMIODARONE HYDROCHLORIDE 200 MG: 200 TABLET ORAL at 08:52

## 2021-02-24 RX ADMIN — ALLOPURINOL 300 MG: 300 TABLET ORAL at 08:52

## 2021-02-24 RX ADMIN — HYDRALAZINE HYDROCHLORIDE 75 MG: 25 TABLET, FILM COATED ORAL at 13:01

## 2021-02-24 RX ADMIN — GABAPENTIN 300 MG: 300 CAPSULE ORAL at 08:52

## 2021-02-24 RX ADMIN — FLUTICASONE FUROATE AND VILANTEROL TRIFENATATE 1 PUFF: 100; 25 POWDER RESPIRATORY (INHALATION) at 08:52

## 2021-02-24 RX ADMIN — Medication 1 MG: at 17:52

## 2021-02-24 RX ADMIN — Medication 10 MG: at 21:05

## 2021-02-24 RX ADMIN — BUMETANIDE 3 MG: 2 TABLET ORAL at 08:51

## 2021-02-24 RX ADMIN — HYDRALAZINE HYDROCHLORIDE 75 MG: 25 TABLET, FILM COATED ORAL at 08:52

## 2021-02-24 RX ADMIN — ISOSORBIDE DINITRATE 10 MG: 10 TABLET ORAL at 13:01

## 2021-02-24 ASSESSMENT — ACTIVITIES OF DAILY LIVING (ADL)
ADLS_ACUITY_SCORE: 12

## 2021-02-24 ASSESSMENT — MIFFLIN-ST. JEOR: SCORE: 1744.28

## 2021-02-24 NOTE — PLAN OF CARE
D: Pt admit 2/8/21 for heart transplant status 2E. PMH NICM EF 20% c/b VT s/p CRTD s/p HM2 LVAD 6/19/2017.    Pt s/p carpal tunnel release surgery 2/18/21. Pt is not to life >2lb on each hand for two weeks.      I/A:   Neuro: A&Ox4. Melatonin before bed for sleep promotion. Pt very pleasant and talked in depth with writer about [employer] beliefs and gratefulness for care received.   VS: VSS. RA. Pt declined home CPAP overnight  LVAD #'s: Pt flow and power elevated when moved from battery to wall around 2045 last evening. (Flow value 7.8-8.2) (Power 7.7). Cards 2 cross cover notified. Pt asymptomatic, VS WNL. Pt LVAD numbers WDL without intervention. no alarms this shift. Dressing CDI   Tele: SR w/1st degree AVB and occasional PVC's  Pain: Pt c/o pain in bilateral wrists/hands following carpal tunnel release procedure 2/18 controlled with PRN tylenol, PRN oral dilauded, PRN robaxin  GI/: Urinating adequately into bedside urinal. BM x2   Diet: 2g Na  BG/insulin: BG Check ACHS. Pt BG 58 at shift start (1919), pt states feeling symptomatic (stomach aching/lightheaded/etc). Hypoglycemic protocol followed. Pt  following apple juice & dinner brought by pt wife. No corrective insulin administered.  IV/Drips: R PIV SL  Activity: independent  Skin/drains: L and R wrist suture CDI. Abrasion on L knee dressing CDI.   Maintain droplet precautions for Rhinovirus     P: Plan to Continue to monitor pt status and report changes to Cards 2.      Erna Rodriguez RN

## 2021-02-24 NOTE — PROCEDURES
The patient's HeartMate LVAD was interrogated 2/24/2021  * Speed 9600 rpm   * Pulsatility index 3-4   * Power 6.7-7.7 Manuel   * Flow 6.5-8.2 L/minute   Fluid status: euvolemic   Alarms were reviewed, and notable for several PI events with speed drops per baseline  The driveline exit site was inspected, dressing cdi.   All external components were inspected and showed no evidence of damage or malfunction, none replaced.   No changes to VAD settings made

## 2021-02-24 NOTE — PROGRESS NOTES
[facility]   Cardiology II Service / Advanced Heart Failure  Daily Progress Note      Patient: Jim Willingham  MRN: [id number]  Admission Date: 2/8/2021  Hospital Day # 16    Assessment and Plan: Jim Willingham is a 65 year old male with history of NICM EF 20% c/b VT s/p CRT-D s/p HMII LVAD 6/19/2017 originally intended as destination therapy 2/2 obesity however with recent weight loss now candidate for transplantation. He is admitted for worsening functional status and renal function concerning for worsening heart failure. He is now listed status 2E for transplant, extension due 3/9/21.    Today's Plan:  -wean dilaudid as previously discussed  -Bumex 3 mg bid, may hold PM dose    #Chronic systolic heart failure 2/2 NICM (EF 20%)   #s/p HM II LVAD placement (6/19/17) as DT (obesity)  #Listed status 2E for transplant due to multiple LVAD complications  Stage D. NYHA class III. TTE 1/8/21 at 9400rpm, EF<30%m LVIDd 6.8cm, at least mildly reduced RV function, AoV closed without AI, mild-mod eccentric MR, dilated IVC without collapse.      Fluid status: RA 8 PCWP 6 today, bumex decreased to 3 mg bid, may hold PM dose due to WALTER  BB: deferred due to recent decompensations and low cardiac output  ACEi/ARB/Afterload reduction: hydralazine 75 TID, isosorbide dinitrate 10 mg TID   Antiplatelet: ASA 81 mg   Anticoagulation: warfarin dosing per pharmacy, INR goal 2-3, INR 2.5 today  SCD: dual chamber ICD  MAP: at goal  LDH: stable, 268                #Carpal tunnel, bilateral R>L s/p surgical bilateral release   Worsening symptoms; significant numbness and pain. E/o thenar atrophy. Note: paraproteins normal in the past, defer further amyloid workup. S/p bilateral surgical release with ortho 2/18.  - weaning dilaudid 1 mg q4hr prn, off tonight as previously discussed  - gabapentin 300, 600, 600 mg at HS (increased 2/23) increase tomorrow if renal function stable  - robaxin 500 mg po TID prn   - APAP increased to  ES dose   - will discuss expected pain course with ortho today     #Polyarticular gout flare  - rheum following, appreciate recs, signed off  - s/p Anakinra subcutaneous x5 days (-2/23), could redose if subsequent flare  - continue allopurinol 300 mg daily    # Non-traumatic fall, in the setting of oxycodone  - PT/OT consulted    Resolve/chronic issues:  #Hx staph hominis bacteremia  Cleared, no concerns for active infection. Off abx since fall 2020 without symptoms of recurrence. No need for surveillance blood cx per transplant ID.    #WALTER on CKD, improved  Admission Cr. 2-2.2. Improved with decreased Bumex as outpatient. Cr now stable 1.6-1.7.  - Cr 1.9 today suspect prerenal w low filling pressures yesterday  - daily BMP     #Recurrent bronchitis, resolved  #Rhinovirus  Started on cefdinir 2/5 plan was for 10 d course (he has does this course frequently in the past). COVID negative 2/8. Worsening rhinorrhea and congestion 2/12. Resp PCR +rhinovirus. CXR 2/12 with atelectasis, no over PNA. Stopped cefdinir 2/15.   - Muccinex prn  - repeat RVP ~3/5 per transplant ID     #H/o atrial fibrillation on warfarin  #H/o VT/VF on amiodarone  Currently in NSR.   - continue warfarin as above  - continue amiodarone 200 mg daily (decreased per Dr Knott note from 2/5 given transplant listing)    #DM type II: glargine stopped per patient request, medium dose insulin sliding scale  #COPD/Asthma: pta Breo Ellipta, albuterol prn  #Depression: pta Bupropion     Diet: 2g Na 2L FR  DVT Prophylaxis: warfarin  Code Status: full code  Lines: DAWSON Wilder DNP, NP-C  Advanced Heart Failure/Cardiology II Service  Pager [phone] [facility] [id number]    ================================================================    Subjective/24-Hr Events:   Last 24 hr care team notes reviewed. No overnight events. Pain controlled today. Denies orthopnea, PND, LE edema. Tolerating ambulation and therapies without symptoms.      ROS:  4 point ROS  "including respiratory, CV, GI and  (other than that noted in the HPI) is negative.     Medications: Reviewed in EPIC.     Physical Exam:   BP (!) 86/63 (BP Location: Left arm)   Pulse 75   Temp 98.1  F (36.7  C) (Oral)   Resp 18   Ht 1.727 m (5' 8\")   Wt 97.5 kg (214 lb 14.4 oz)   SpO2 94%   BMI 32.68 kg/m      GENERAL: Appears comfortable, in no distress.  HEENT: Eye symmetrical, no discharge or icterus bilaterally. Mucous membranes moist and without lesions.  NECK: Supple, JVD not visible at 75 degrees.   CV: +mechanical LVAD hum.  RESPIRATORY: Respirations regular, even, and unlabored. Lungs CTA throughout.    GI: Soft and non distended with normoactive bowel sounds present in all quadrants. No tenderness, rebound, guarding.   EXTREMITIES: No peripheral edema. Non pulsatile.   NEUROLOGIC: Alert and oriented x 3. No focal deficits.   MUSCULOSKELETAL: No joint swelling or tenderness.   SKIN: No jaundice. No rashes or lesions.     Labs:  CMP  Recent Labs   Lab 02/24/21  0610 02/23/21  0614 02/22/21  0604 02/21/21  0535    135 134 132*   POTASSIUM 3.6 3.8 3.8 3.8   CHLORIDE 103 102 100 98   CO2 26 28 26 29   ANIONGAP 7 5 8 6   GLC 91 101* 89 98   BUN 36* 36* 34* 33*   CR 1.91* 1.68* 1.66* 1.53*   GFRESTIMATED 36* 42* 43* 47*   GFRESTBLACK 42* 49* 50* 55*   QAMAR 8.7 8.6 8.6 8.7   MAG 2.3 2.3 2.3 2.2   PROTTOTAL  --  6.4*  --   --    ALBUMIN  --  3.3*  --   --    BILITOTAL  --  0.6  --   --    ALKPHOS  --  96  --   --    AST  --  32  --   --    ALT  --  33  --   --        CBC  Recent Labs   Lab 02/23/21  0614 02/21/21  0535 02/19/21  0545   WBC 5.9 5.1 7.0   RBC 3.85* 4.07* 3.81*   HGB 10.8* 11.2* 10.7*   HCT 34.7* 37.0* 34.3*   MCV 90 91 90   MCH 28.1 27.5 28.1   MCHC 31.1* 30.3* 31.2*   RDW 19.0* 18.8* 19.5*    266 251       INR  Recent Labs   Lab 02/24/21  0610 02/23/21  0614 02/22/21  0604 02/21/21  0535   INR 2.51* 2.74* 2.84* 2.38*       Time/Communication  I personally spent a total of 25 " minutes. Of that 15 minutes was counseling/coordination of patient's care. Plan of care discussed with patient. See my note above for details.    Patient discussed with Dr. Conte.

## 2021-02-24 NOTE — PROGRESS NOTES
"Plastic Surgery Progress Note    S: No acute events.    O:  BP (!) 86/63 (BP Location: Left arm)   Pulse 75   Temp 98.1  F (36.7  C) (Oral)   Resp 18   Ht 1.727 m (5' 8\")   Wt 97.5 kg (214 lb 14.4 oz)   SpO2 94%   BMI 32.68 kg/m    Gen: NAD, AOx3  Ext: Bilateral carpal tunnel release dressing intact. SILT in all digits. Able to oppose thumb bilaterally. Dressings CDI    A/P:  64 yo POD #5 s/p bilateral carpal tunnel release. Convalescing appropriately.    - Wraps and dressings removed this AM, ok to leave open to air. May dress with bandage for protection or comfort if needed  - Elevate bilateral extremities (on pillows, extremity foam wedges, arm slings, etc.)  - Suture to remain in place for ~2 weeks  - No lifting > ~2lb per hand for 2 weeks    Jermaine Abreu MD PGY6  Plastic and Reconstructive Surgery RL1      "

## 2021-02-24 NOTE — PLAN OF CARE
Physical Therapy Discharge Summary    Reason for therapy discharge:    All goals and outcomes met, no further needs identified.    Progress towards therapy goal(s). See goals on Care Plan in [facility] electronic health record for goal details.  Goals met    Therapy recommendation(s):    Continue home exercise program.

## 2021-02-25 LAB
ANION GAP SERPL CALCULATED.3IONS-SCNC: 6 MMOL/L (ref 3–14)
BUN SERPL-MCNC: 37 MG/DL (ref 7–30)
CALCIUM SERPL-MCNC: 8.5 MG/DL (ref 8.5–10.1)
CHLORIDE SERPL-SCNC: 104 MMOL/L (ref 94–109)
CO2 SERPL-SCNC: 26 MMOL/L (ref 20–32)
CREAT SERPL-MCNC: 1.69 MG/DL (ref 0.66–1.25)
ERYTHROCYTE [DISTWIDTH] IN BLOOD BY AUTOMATED COUNT: 19.5 % (ref 10–15)
GFR SERPL CREATININE-BSD FRML MDRD: 42 ML/MIN/{1.73_M2}
GLUCOSE BLDC GLUCOMTR-MCNC: 102 MG/DL (ref 70–99)
GLUCOSE BLDC GLUCOMTR-MCNC: 104 MG/DL (ref 70–99)
GLUCOSE BLDC GLUCOMTR-MCNC: 126 MG/DL (ref 70–99)
GLUCOSE BLDC GLUCOMTR-MCNC: 63 MG/DL (ref 70–99)
GLUCOSE BLDC GLUCOMTR-MCNC: 82 MG/DL (ref 70–99)
GLUCOSE BLDC GLUCOMTR-MCNC: 82 MG/DL (ref 70–99)
GLUCOSE BLDC GLUCOMTR-MCNC: 96 MG/DL (ref 70–99)
GLUCOSE SERPL-MCNC: 121 MG/DL (ref 70–99)
HCT VFR BLD AUTO: 35.6 % (ref 40–53)
HGB BLD-MCNC: 10.6 G/DL (ref 13.3–17.7)
INR PPP: 2.59 (ref 0.86–1.14)
MAGNESIUM SERPL-MCNC: 2.3 MG/DL (ref 1.6–2.3)
MCH RBC QN AUTO: 27 PG (ref 26.5–33)
MCHC RBC AUTO-ENTMCNC: 29.8 G/DL (ref 31.5–36.5)
MCV RBC AUTO: 91 FL (ref 78–100)
PLATELET # BLD AUTO: 296 10E9/L (ref 150–450)
POTASSIUM SERPL-SCNC: 4.1 MMOL/L (ref 3.4–5.3)
RBC # BLD AUTO: 3.92 10E12/L (ref 4.4–5.9)
SODIUM SERPL-SCNC: 135 MMOL/L (ref 133–144)
WBC # BLD AUTO: 5.5 10E9/L (ref 4–11)

## 2021-02-25 PROCEDURE — 99232 SBSQ HOSP IP/OBS MODERATE 35: CPT | Mod: 25 | Performed by: NURSE PRACTITIONER

## 2021-02-25 PROCEDURE — 250N000013 HC RX MED GY IP 250 OP 250 PS 637: Performed by: STUDENT IN AN ORGANIZED HEALTH CARE EDUCATION/TRAINING PROGRAM

## 2021-02-25 PROCEDURE — 83735 ASSAY OF MAGNESIUM: CPT | Performed by: STUDENT IN AN ORGANIZED HEALTH CARE EDUCATION/TRAINING PROGRAM

## 2021-02-25 PROCEDURE — 250N000013 HC RX MED GY IP 250 OP 250 PS 637: Performed by: NURSE PRACTITIONER

## 2021-02-25 PROCEDURE — 36415 COLL VENOUS BLD VENIPUNCTURE: CPT | Performed by: STUDENT IN AN ORGANIZED HEALTH CARE EDUCATION/TRAINING PROGRAM

## 2021-02-25 PROCEDURE — 250N000013 HC RX MED GY IP 250 OP 250 PS 637: Performed by: INTERNAL MEDICINE

## 2021-02-25 PROCEDURE — 85610 PROTHROMBIN TIME: CPT | Performed by: STUDENT IN AN ORGANIZED HEALTH CARE EDUCATION/TRAINING PROGRAM

## 2021-02-25 PROCEDURE — 999N001017 HC STATISTIC GLUCOSE BY METER IP

## 2021-02-25 PROCEDURE — 93750 INTERROGATION VAD IN PERSON: CPT | Performed by: NURSE PRACTITIONER

## 2021-02-25 PROCEDURE — 80048 BASIC METABOLIC PNL TOTAL CA: CPT | Performed by: STUDENT IN AN ORGANIZED HEALTH CARE EDUCATION/TRAINING PROGRAM

## 2021-02-25 PROCEDURE — 214N000001 HC R&B CCU UMMC

## 2021-02-25 PROCEDURE — 85027 COMPLETE CBC AUTOMATED: CPT | Performed by: STUDENT IN AN ORGANIZED HEALTH CARE EDUCATION/TRAINING PROGRAM

## 2021-02-25 RX ORDER — WARFARIN SODIUM 7.5 MG/1
7.5 TABLET ORAL
Status: COMPLETED | OUTPATIENT
Start: 2021-02-25 | End: 2021-02-25

## 2021-02-25 RX ORDER — ACETAMINOPHEN 325 MG/1
650 TABLET ORAL
Status: DISCONTINUED | OUTPATIENT
Start: 2021-02-25 | End: 2021-03-04

## 2021-02-25 RX ADMIN — ASPIRIN 81 MG CHEWABLE TABLET 81 MG: 81 TABLET CHEWABLE at 08:39

## 2021-02-25 RX ADMIN — UMECLIDINIUM 1 PUFF: 62.5 AEROSOL, POWDER ORAL at 09:39

## 2021-02-25 RX ADMIN — Medication 10 MG: at 22:38

## 2021-02-25 RX ADMIN — GABAPENTIN 300 MG: 300 CAPSULE ORAL at 08:40

## 2021-02-25 RX ADMIN — ISOSORBIDE DINITRATE 10 MG: 10 TABLET ORAL at 13:04

## 2021-02-25 RX ADMIN — ACETAMINOPHEN 650 MG: 325 TABLET, FILM COATED ORAL at 13:03

## 2021-02-25 RX ADMIN — HYDRALAZINE HYDROCHLORIDE 75 MG: 25 TABLET, FILM COATED ORAL at 08:39

## 2021-02-25 RX ADMIN — BUPROPION HYDROCHLORIDE 150 MG: 150 TABLET, EXTENDED RELEASE ORAL at 08:39

## 2021-02-25 RX ADMIN — ALLOPURINOL 300 MG: 300 TABLET ORAL at 08:39

## 2021-02-25 RX ADMIN — HYDRALAZINE HYDROCHLORIDE 75 MG: 25 TABLET, FILM COATED ORAL at 13:04

## 2021-02-25 RX ADMIN — AMIODARONE HYDROCHLORIDE 200 MG: 200 TABLET ORAL at 08:40

## 2021-02-25 RX ADMIN — MONTELUKAST 10 MG: 10 TABLET, FILM COATED ORAL at 22:38

## 2021-02-25 RX ADMIN — FLUTICASONE FUROATE AND VILANTEROL TRIFENATATE 1 PUFF: 100; 25 POWDER RESPIRATORY (INHALATION) at 09:38

## 2021-02-25 RX ADMIN — ISOSORBIDE DINITRATE 10 MG: 10 TABLET ORAL at 20:27

## 2021-02-25 RX ADMIN — ISOSORBIDE DINITRATE 10 MG: 10 TABLET ORAL at 08:40

## 2021-02-25 RX ADMIN — METHOCARBAMOL 500 MG: 500 TABLET, FILM COATED ORAL at 08:40

## 2021-02-25 RX ADMIN — WARFARIN SODIUM 7.5 MG: 7.5 TABLET ORAL at 18:10

## 2021-02-25 RX ADMIN — BUMETANIDE 3 MG: 2 TABLET ORAL at 16:49

## 2021-02-25 RX ADMIN — HYDRALAZINE HYDROCHLORIDE 75 MG: 25 TABLET, FILM COATED ORAL at 20:26

## 2021-02-25 RX ADMIN — POTASSIUM CHLORIDE 60 MEQ: 750 TABLET, EXTENDED RELEASE ORAL at 08:39

## 2021-02-25 RX ADMIN — GABAPENTIN 600 MG: 300 CAPSULE ORAL at 16:49

## 2021-02-25 RX ADMIN — ACETAMINOPHEN 650 MG: 325 TABLET, FILM COATED ORAL at 16:49

## 2021-02-25 RX ADMIN — ACETAMINOPHEN 975 MG: 325 TABLET, FILM COATED ORAL at 04:04

## 2021-02-25 RX ADMIN — BUMETANIDE 3 MG: 2 TABLET ORAL at 09:21

## 2021-02-25 RX ADMIN — ACETAMINOPHEN 650 MG: 325 TABLET, FILM COATED ORAL at 20:35

## 2021-02-25 RX ADMIN — GABAPENTIN 600 MG: 300 CAPSULE ORAL at 22:38

## 2021-02-25 ASSESSMENT — ACTIVITIES OF DAILY LIVING (ADL)
ADLS_ACUITY_SCORE: 12

## 2021-02-25 ASSESSMENT — MIFFLIN-ST. JEOR: SCORE: 1765.15

## 2021-02-25 NOTE — PROCEDURES
The patient's HeartMate LVAD was interrogated 2/25/2021  * Speed 9600 rpm   * Pulsatility index 3.9-6.8   * Power 4.1-4.9 Manuel   * Flow 5.8-7 L/minute   Fluid status: euvolemic   Alarms were reviewed, and notable for several PI events with speed drops per baseline  The driveline exit site was inspected, dressing cdi.   All external components were inspected and showed no evidence of damage or malfunction, none replaced.   No changes to VAD settings made

## 2021-02-25 NOTE — PROGRESS NOTES
[facility]   Cardiology II Service / Advanced Heart Failure  Daily Progress Note      Patient: Jim Willingham  MRN: [id number]  Admission Date: 2/8/2021  Hospital Day # 17    Assessment and Plan: Jim Willingham is a 65 year old male with history of NICM EF 20% c/b VT s/p CRT-D s/p HMII LVAD 6/19/2017 originally intended as destination therapy 2/2 obesity however with recent weight loss now candidate for transplantation. He is admitted for worsening functional status and renal function concerning for worsening heart failure. He is now listed status 2E for transplant, extension due 3/9/21.    Today's Plan:  -for hypoglycemia, encourage increased protein especially in AMs  -if BS<70, check serum glucose, C peptide, and proinsulin   -resume Bumex 3 mg bid  -schedule APAP while awake    #Chronic systolic heart failure 2/2 NICM (EF 20%)   #s/p HM II LVAD placement (6/19/17) as DT (obesity)  #Listed status 2E for transplant due to multiple LVAD complications  Stage D. NYHA class III. TTE 1/8/21 at 9400rpm, EF<30%m LVIDd 6.8cm, at least mildly reduced RV function, AoV closed without AI, mild-mod eccentric MR, dilated IVC without collapse. RHC 2/23 RA 7 PA 26/8(15) PCWP 6 Kee C/CO 4.6/2.2 Td CO/CI 5.5/2.6      Fluid status: resume bumex 3 mg bid   BB: deferred due to recent decompensations and low cardiac output  ACEi/ARB/Afterload reduction: hydralazine 75 TID, isosorbide dinitrate 10 mg TID   Antiplatelet: ASA 81 mg   Anticoagulation: warfarin dosing per pharmacy, INR goal 2-3, INR 2.6 today  SCD: dual chamber ICD  MAP: at goal  LDH: stable, 268                #Carpal tunnel, bilateral R>L s/p surgical bilateral release   Worsening symptoms; significant numbness and pain. E/o thenar atrophy. Note: paraproteins normal in the past, defer further amyloid workup. s/p bilateral surgical release with ortho 2/18.  - weaned off dilaudid 2/24  - gabapentin 300, 600, 600 mg at HS (increased 2/23)   - robaxin 500 mg  po TID prn   - APAP scheduled 650 mg q4hr WA     #Polyarticular gout flare  - rheum following, appreciate recs, signed off  - s/p Anakinra subcutaneous x5 days (-2/23), could redose if subsequent flare  - continue allopurinol 300 mg daily    #Symptomatic hypoglycemia  #Hx Sylvester-en-Y  #Diet controlled DMII  Episodes of low BS starting 2/23 with symptoms. No sliding scale insulin since 2/19, no new medications to explain. Stopped lantus several weeks ago.   - discussed with endocrine 2/25, encourage increased protein (eggs, nuts specifically)  - next time BS<70, check serum glucose, C peptide, and pro-insulin    Resolve/chronic issues:  #Non-traumatic fall in the setting of oxycodone  - PT/OT consulted    #Hx staph hominis bacteremia  Cleared, no concerns for active infection. Off abx since fall 2020 without symptoms of recurrence. No need for surveillance blood cx per transplant ID.    #WALTER on CKD, improved  Admission Cr. 2-2.2. Improved with decreased Bumex as outpatient. Cr now stable 1.6-1.7.  - daily BMP     #Recurrent bronchitis, resolved  #Rhinovirus, symptoms resolved  Started on cefdinir 2/5 plan was for 10d course (he has does this course frequently in the past). COVID negative 2/8. Worsening rhinorrhea and congestion 2/12. Resp PCR +rhinovirus. CXR 2/12 with atelectasis, no PNA. Stopped cefdinir 2/15.   - Muccinex prn  - repeat RVP ~3/5 per transplant ID     #H/o atrial fibrillation on warfarin  #H/o VT/VF on amiodarone  Currently in NSR.   - continue warfarin as above  - continue amiodarone 200 mg daily (decreased per Dr Knott note from 2/5 given transplant listing)    #COPD/Asthma: pta Breo Ellipta, albuterol prn  #Depression: pta Bupropion     Diet: 2g Na 2L FR  DVT Prophylaxis: warfarin  Code Status: full code  Lines: DAWSON Wilder DNP, NP-C  Advanced Heart Failure/Cardiology II Service  Pager [phone] [facility]  "[id number]    ================================================================    Subjective/24-Hr Events:   Last 24 hr care team notes reviewed. No overnight events. Continues to have some symptoms of \"fogginess\" when BS<90. This is new. Denies any other concerns.     ROS:  4 point ROS including respiratory, CV, GI and  (other than that noted in the HPI) is negative.     Medications: Reviewed in EPIC.     Physical Exam:   /75 (BP Location: Right arm)   Pulse 63   Temp 98.3  F (36.8  C) (Oral)   Resp 16   Ht 1.727 m (5' 8\")   Wt 99.6 kg (219 lb 8 oz)   SpO2 100%   BMI 33.37 kg/m      GENERAL: Appears comfortable, in no distress.  HEENT: Eye symmetrical, no discharge or icterus bilaterally. Mucous membranes moist and without lesions.  NECK: Supple, JVD not visible at 75 degrees.   CV: +mechanical LVAD hum.  RESPIRATORY: Respirations regular, even, and unlabored. Lungs CTA throughout.    GI: Soft and non distended with normoactive bowel sounds present in all quadrants. No tenderness, rebound, guarding.   EXTREMITIES: Trace BLE peripheral edema. Non pulsatile.   NEUROLOGIC: Alert and oriented x 3. No focal deficits.   MUSCULOSKELETAL: No joint swelling or tenderness.   SKIN: No jaundice. No rashes or lesions.     Labs:  CMP  Recent Labs   Lab 02/25/21  0534 02/24/21  0610 02/23/21  0614 02/22/21  0604    135 135 134   POTASSIUM 4.1 3.6 3.8 3.8   CHLORIDE 104 103 102 100   CO2 26 26 28 26   ANIONGAP 6 7 5 8   * 91 101* 89   BUN 37* 36* 36* 34*   CR 1.69* 1.91* 1.68* 1.66*   GFRESTIMATED 42* 36* 42* 43*   GFRESTBLACK 49* 42* 49* 50*   QAMAR 8.5 8.7 8.6 8.6   MAG 2.3 2.3 2.3 2.3   PROTTOTAL  --   --  6.4*  --    ALBUMIN  --   --  3.3*  --    BILITOTAL  --   --  0.6  --    ALKPHOS  --   --  96  --    AST  --   --  32  --    ALT  --   --  33  --        CBC  Recent Labs   Lab 02/25/21  0534 02/23/21  0614 02/21/21  0535 02/19/21  0545   WBC 5.5 5.9 5.1 7.0   RBC 3.92* 3.85* 4.07* 3.81*   HGB 10.6* 10.8* " 11.2* 10.7*   HCT 35.6* 34.7* 37.0* 34.3*   MCV 91 90 91 90   MCH 27.0 28.1 27.5 28.1   MCHC 29.8* 31.1* 30.3* 31.2*   RDW 19.5* 19.0* 18.8* 19.5*    268 266 251       INR  Recent Labs   Lab 02/25/21  0534 02/24/21  0610 02/23/21  0614 02/22/21  0604   INR 2.59* 2.51* 2.74* 2.84*       Time/Communication  I personally spent a total of 25 minutes. Of that 15 minutes was counseling/coordination of patient's care. Plan of care discussed with patient. See my note above for details.    Patient discussed with Dr. Conte.

## 2021-02-25 NOTE — PROGRESS NOTES
[facility] Nurse Inpatient Wound Assessment   Reason for consultation: Evaluate and treat  Left knee wounds    Assessment  Left knee wounds due to Trauma  Status: improving  Partial thickness, concern for bleeding 2/2 anticoagulation therapy   Treatment Plan  Left knee wounds: Every 3 days and PRN :  Cleanse with microklenz spray and gauze.  Pat dry   Cover with primapore dressing.       Orders Written  WOC Nurse follow-up plan:signing off  Nursing to notify the Provider(s) and re-consult the WOC Nurse if wound(s) deteriorates or new skin concern.    Patient History  According to provider note(s):  Admitted 2/8 with decompensated heart failure. pmh significant for NICM EF 20% c/b VT s/p CRT-D s/p HMII LVAD 6/19/2017 originally intended as destination therapy 2/2 obesity, recent weight loss, atrial fibrillation s/p DCCV on amiodarone and warfarin, history of staph hominis bacteremia, CKD stage 3, COPD who presents for worsening dyspnea on exertion, lethargy.      Objective Data  Active Diet Order  Orders Placed This Encounter      2 Gram Sodium Diet    Output:   I/O last 3 completed shifts:  In: 1080 [P.O.:1080]  Out: 3075 [Urine:3075]    Risk Assessment:   Sensory Perception: 3-->slightly limited  Moisture: 4-->rarely moist  Activity: 3-->walks occasionally  Mobility: 4-->no limitation  Nutrition: 3-->adequate  Friction and Shear: 3-->no apparent problem  [place] Score: 20                          Labs:   Recent Labs   Lab 02/25/21  0534 02/23/21  0614 02/23/21  0614   ALBUMIN  --   --  3.3*   HGB 10.6*  --  10.8*   INR 2.59*   < > 2.74*   WBC 5.5  --  5.9    < > = values in this interval not displayed.       Physical Exam  Wound Location:  Left anterior knee    Date of last photo 2/25/21  Wound History: pt doesn't know how he scrapped the knee about 2 days ago   Wound Base: 100 % dermis, dry     Palpation of the wound bed: normal      Drainage: none     Measurements (length x width x depth, in cm) 0.5  x 1.8  x  0.1 cm       Tunneling N/A     Undermining N/A  Periwound skin: intact      Color: normal and consistent with surrounding tissue      Temperature: normal   Odor: none  Pain: absent,     Interventions  Visual inspection and assessment completed   Wound Care Rationale Provide protection  and promote clotting  Wound Care: completed by RN  Supplies: ordered: calcium alginate  Current support surface: Standard  Atmos Air mattress  Education provided to: plan of care and wound progress  Discussed plan of care with Patient and RN    Rajani Acosta RN CWOCN

## 2021-02-25 NOTE — PLAN OF CARE
D: Pt admit 2/8/21 for heart transplant status 2E. PMH NICM EF 20% c/b VT s/p CRTD s/p HM2 LVAD 6/19/2017.     Pt s/p carpal tunnel release surgery 2/18/21. Pt is not to lift >2lb on each hand for two weeks.      I/A:   Neuro: A&Ox4. Melatonin before bed for sleep promotion.   VS: VSS. RA. Pt declined home CPAP overnight  LVAD #'s: WDL. Flow elevated >7 at times, pt asymptomatic, #'s returned WNL without intervention. no alarms this shift. Dressing CDI   Tele: SR w/1st degree AVB and occasional PVC's  Pain: Pt c/o pain in bilateral hands following carpal tunnel release procedure 2/18 controlled with PRN robaxin and tylenol  GI/: Urinating adequately into bedside urinal. No BM this shift.   Diet: 2g Na  BG/insulin: BG Check ACHS. No corrective insulin administered. BG 82 this AM, apple juice given d/t previous hypoglycemic episodes   IV/Drips: R PIV SL  Activity: independent  Skin/drains: L and R wrist suture CDI. Abrasion on L knee dressing CDI.   Maintain droplet precautions for Rhinovirus     P: Plan to Continue to monitor pt status and report changes to Cards 2.      Erna Rodriguez RN

## 2021-02-25 NOTE — PLAN OF CARE
Presents for worsening dyspnea on exertion, lethargy. Decompensated heart failure. Currently here for heart txp waitlist status 2E. Pmhx:  NICM EF 20% c/b VT s/p CRT-D s/p HMII LVAD 2017 originally intended as destination therapy 2/2 obesity, recent weight loss, afib s/p DCCV on amiodarone and warfarin, staph hominis bacteremia, CKD3, COPD    Code status: Full     Team: cards 2  PRN: Robaxin x 1            Tylenol x 1              Neuro: ao*4  Cardiac/Tele:  SR w/ 1*AVB w/ BBB, HM2 #'s wnl  Respiratory: room air  GI/: urinating via urinal/toliet  Diet/Appetite: 2g na  Skin: left knee abrasion, bilateral arm sutures   Endocrine: ACHS, no insulin given (apple juice given due to BG under 90s to prevent hypoglyemcia  LDAs: R PIV SL  Activity: up ad francisco  Pain: bilateral hand pain    Nuris Darling, RN  Time cared for 0700 - 1930

## 2021-02-26 ENCOUNTER — APPOINTMENT (OUTPATIENT)
Dept: GENERAL RADIOLOGY | Facility: CLINIC | Age: 66
DRG: 001 | End: 2021-02-26
Attending: NURSE PRACTITIONER
Payer: COMMERCIAL

## 2021-02-26 LAB
ALBUMIN SERPL-MCNC: 3.3 G/DL (ref 3.4–5)
ALP SERPL-CCNC: 104 U/L (ref 40–150)
ALT SERPL W P-5'-P-CCNC: 38 U/L (ref 0–70)
ANION GAP SERPL CALCULATED.3IONS-SCNC: 5 MMOL/L (ref 3–14)
AST SERPL W P-5'-P-CCNC: 42 U/L (ref 0–45)
BILIRUB DIRECT SERPL-MCNC: 0.2 MG/DL (ref 0–0.2)
BILIRUB SERPL-MCNC: 0.6 MG/DL (ref 0.2–1.3)
BUN SERPL-MCNC: 40 MG/DL (ref 7–30)
CALCIUM SERPL-MCNC: 8.3 MG/DL (ref 8.5–10.1)
CHLORIDE SERPL-SCNC: 102 MMOL/L (ref 94–109)
CO2 SERPL-SCNC: 28 MMOL/L (ref 20–32)
CREAT SERPL-MCNC: 1.8 MG/DL (ref 0.66–1.25)
GFR SERPL CREATININE-BSD FRML MDRD: 39 ML/MIN/{1.73_M2}
GLUCOSE BLDC GLUCOMTR-MCNC: 122 MG/DL (ref 70–99)
GLUCOSE BLDC GLUCOMTR-MCNC: 188 MG/DL (ref 70–99)
GLUCOSE BLDC GLUCOMTR-MCNC: 80 MG/DL (ref 70–99)
GLUCOSE SERPL-MCNC: 90 MG/DL (ref 70–99)
INR PPP: 2.79 (ref 0.86–1.14)
MAGNESIUM SERPL-MCNC: 2.3 MG/DL (ref 1.6–2.3)
POTASSIUM SERPL-SCNC: 4 MMOL/L (ref 3.4–5.3)
PROT SERPL-MCNC: 6.3 G/DL (ref 6.8–8.8)
SODIUM SERPL-SCNC: 135 MMOL/L (ref 133–144)

## 2021-02-26 PROCEDURE — 99232 SBSQ HOSP IP/OBS MODERATE 35: CPT | Mod: 25 | Performed by: NURSE PRACTITIONER

## 2021-02-26 PROCEDURE — 250N000013 HC RX MED GY IP 250 OP 250 PS 637: Performed by: NURSE PRACTITIONER

## 2021-02-26 PROCEDURE — 250N000013 HC RX MED GY IP 250 OP 250 PS 637: Performed by: STUDENT IN AN ORGANIZED HEALTH CARE EDUCATION/TRAINING PROGRAM

## 2021-02-26 PROCEDURE — 214N000001 HC R&B CCU UMMC

## 2021-02-26 PROCEDURE — 250N000013 HC RX MED GY IP 250 OP 250 PS 637: Performed by: INTERNAL MEDICINE

## 2021-02-26 PROCEDURE — 80076 HEPATIC FUNCTION PANEL: CPT | Performed by: STUDENT IN AN ORGANIZED HEALTH CARE EDUCATION/TRAINING PROGRAM

## 2021-02-26 PROCEDURE — 71046 X-RAY EXAM CHEST 2 VIEWS: CPT

## 2021-02-26 PROCEDURE — 85610 PROTHROMBIN TIME: CPT | Performed by: STUDENT IN AN ORGANIZED HEALTH CARE EDUCATION/TRAINING PROGRAM

## 2021-02-26 PROCEDURE — 36415 COLL VENOUS BLD VENIPUNCTURE: CPT | Performed by: STUDENT IN AN ORGANIZED HEALTH CARE EDUCATION/TRAINING PROGRAM

## 2021-02-26 PROCEDURE — 71046 X-RAY EXAM CHEST 2 VIEWS: CPT | Mod: 26 | Performed by: RADIOLOGY

## 2021-02-26 PROCEDURE — 93750 INTERROGATION VAD IN PERSON: CPT | Performed by: NURSE PRACTITIONER

## 2021-02-26 PROCEDURE — 80048 BASIC METABOLIC PNL TOTAL CA: CPT | Performed by: STUDENT IN AN ORGANIZED HEALTH CARE EDUCATION/TRAINING PROGRAM

## 2021-02-26 PROCEDURE — 83735 ASSAY OF MAGNESIUM: CPT | Performed by: STUDENT IN AN ORGANIZED HEALTH CARE EDUCATION/TRAINING PROGRAM

## 2021-02-26 PROCEDURE — 999N001017 HC STATISTIC GLUCOSE BY METER IP

## 2021-02-26 PROCEDURE — 80076 HEPATIC FUNCTION PANEL: CPT | Performed by: NURSE PRACTITIONER

## 2021-02-26 RX ORDER — WARFARIN SODIUM 5 MG/1
5 TABLET ORAL
Status: COMPLETED | OUTPATIENT
Start: 2021-02-26 | End: 2021-02-26

## 2021-02-26 RX ADMIN — ACETAMINOPHEN 650 MG: 325 TABLET, FILM COATED ORAL at 07:36

## 2021-02-26 RX ADMIN — FLUTICASONE FUROATE AND VILANTEROL TRIFENATATE 1 PUFF: 100; 25 POWDER RESPIRATORY (INHALATION) at 07:38

## 2021-02-26 RX ADMIN — GABAPENTIN 300 MG: 300 CAPSULE ORAL at 07:36

## 2021-02-26 RX ADMIN — BUPROPION HYDROCHLORIDE 150 MG: 150 TABLET, EXTENDED RELEASE ORAL at 07:37

## 2021-02-26 RX ADMIN — GABAPENTIN 600 MG: 300 CAPSULE ORAL at 21:27

## 2021-02-26 RX ADMIN — ALBUTEROL SULFATE 2 PUFF: 90 AEROSOL, METERED RESPIRATORY (INHALATION) at 14:15

## 2021-02-26 RX ADMIN — BUMETANIDE 3 MG: 2 TABLET ORAL at 07:37

## 2021-02-26 RX ADMIN — HYDRALAZINE HYDROCHLORIDE 75 MG: 25 TABLET, FILM COATED ORAL at 14:20

## 2021-02-26 RX ADMIN — ACETAMINOPHEN 650 MG: 325 TABLET, FILM COATED ORAL at 19:53

## 2021-02-26 RX ADMIN — POTASSIUM CHLORIDE 60 MEQ: 750 TABLET, EXTENDED RELEASE ORAL at 07:37

## 2021-02-26 RX ADMIN — GABAPENTIN 600 MG: 300 CAPSULE ORAL at 14:20

## 2021-02-26 RX ADMIN — WARFARIN SODIUM 5 MG: 5 TABLET ORAL at 18:16

## 2021-02-26 RX ADMIN — ALLOPURINOL 300 MG: 300 TABLET ORAL at 07:37

## 2021-02-26 RX ADMIN — UMECLIDINIUM 1 PUFF: 62.5 AEROSOL, POWDER ORAL at 07:38

## 2021-02-26 RX ADMIN — HYDRALAZINE HYDROCHLORIDE 75 MG: 25 TABLET, FILM COATED ORAL at 19:53

## 2021-02-26 RX ADMIN — ASPIRIN 81 MG CHEWABLE TABLET 81 MG: 81 TABLET CHEWABLE at 07:37

## 2021-02-26 RX ADMIN — AMIODARONE HYDROCHLORIDE 200 MG: 200 TABLET ORAL at 07:37

## 2021-02-26 RX ADMIN — BUMETANIDE 3 MG: 2 TABLET ORAL at 16:02

## 2021-02-26 RX ADMIN — ISOSORBIDE DINITRATE 10 MG: 10 TABLET ORAL at 19:54

## 2021-02-26 RX ADMIN — Medication 10 MG: at 21:26

## 2021-02-26 RX ADMIN — MONTELUKAST 10 MG: 10 TABLET, FILM COATED ORAL at 21:27

## 2021-02-26 RX ADMIN — HYDRALAZINE HYDROCHLORIDE 75 MG: 25 TABLET, FILM COATED ORAL at 07:37

## 2021-02-26 RX ADMIN — ACETAMINOPHEN 650 MG: 325 TABLET, FILM COATED ORAL at 12:07

## 2021-02-26 RX ADMIN — ISOSORBIDE DINITRATE 10 MG: 10 TABLET ORAL at 07:38

## 2021-02-26 RX ADMIN — ISOSORBIDE DINITRATE 10 MG: 10 TABLET ORAL at 14:20

## 2021-02-26 RX ADMIN — ACETAMINOPHEN 650 MG: 325 TABLET, FILM COATED ORAL at 16:02

## 2021-02-26 ASSESSMENT — ACTIVITIES OF DAILY LIVING (ADL)
ADLS_ACUITY_SCORE: 12

## 2021-02-26 ASSESSMENT — MIFFLIN-ST. JEOR: SCORE: 1767.41

## 2021-02-26 NOTE — PLAN OF CARE
Presents for worsening dyspnea on exertion, lethargy. Decompensated heart failure. Currently here for heart txp waitlist status 2E. Pmhx:  NICM EF 20% c/b VT s/p CRT-D s/p HMII LVAD 2017 originally intended as destination therapy 2/2 obesity, recent weight loss, afib s/p DCCV on amiodarone and warfarin, staph hominis bacteremia, CKD3, COPD    Code status: Full     Team: cards 2  PRN: Robaxin x 1              Neuro: ao*4  Cardiac/Tele:  SR w/ 1*AVB w/ BBB, HM2 #'s wnl  Respiratory: room air  GI/: urinating via urinal/toliet, LBM 2/25 per pt   Diet/Appetite: 2g na  Skin: left knee abrasion (dressing changed), bilateral arm sutures   Endocrine: ACHS, no insulin given (apple juice given due to BG under 90s to prevent hypoglyemcia), if BS<70, check serum glucose, C peptide, and proinsulin   LDAs: R PIV SL  Activity: up ad francisco  Pain: bilateral hand pain, tylenol scheduled q4h     Nuris Darling, RN  Time cared for 0700 - 1930

## 2021-02-26 NOTE — PROCEDURES
The patient's HeartMate LVAD was interrogated 2/26/2021  * Speed 9600 rpm   * Pulsatility index 4.1-5   * Power 6.5-7 Manuel   * Flow 5.8-6.9 L/minute   Fluid status: euvolemic   Alarms were reviewed, and notable for several PI events with speed drops per baseline  The driveline exit site was inspected, dressing cdi.   All external components were inspected and showed no evidence of damage or malfunction, none replaced.   No changes to VAD settings made

## 2021-02-26 NOTE — PROGRESS NOTES
"LVAD/Transplant Social Work Services Progress Note      Date of Initial Social Work Evaluation: 2/10/2021  Collaborated with: Pt and his wife, Cate, at bedside     Data: Pt is an LVAD pt now listed status 2E for heart transplant. Pt had bilateral carpal tunnel surgery 2/20.   Reinforced transplant education and answered questions they had regarding post-op expectations.     Intervention: Supportive Visit  Assessment: Pt doing well. He is glad to be off of the narcotics and states \"I feel more like myself\". Pt is engaged with pleasant affect. Pt and wife asking good questions around transplant. Pt really coping well with extended hospitalization and has no mental health concerns. Wife coming to visit as much as she can and he has been talking to his adopted granddaughter frequently. Pt is optimistic that a heart offer will come soon.   Education provided by SW: Ongoing Social Work support, Transplant Education, Availability of Virtual LVAD and Heart Transplant Support Groups  Plan:    Discharge Plans in Progress: None     Barriers to d/c plan: Waiting for Heart Transplant     Follow up Plan: SW will continue to follow for support and assess coping and mental health needs with extended hospitalization.     "

## 2021-02-26 NOTE — PROGRESS NOTES
[facility]   Cardiology II Service / Advanced Heart Failure  Daily Progress Note      Patient: Jim Willingham  MRN: [id number]  Admission Date: 2/8/2021  Hospital Day # 18    Assessment and Plan: Jim Willingham is a 65 year old male with history of NICM EF 20% c/b VT s/p CRT-D s/p HMII LVAD 6/19/2017 originally intended as destination therapy 2/2 obesity however with recent weight loss now candidate for transplantation. He is admitted for worsening functional status and renal function concerning for worsening heart failure. He is now listed status 2E for transplant, extension due 3/9/21.    Today's Plan:  -for hypoglycemia, encourage increased protein especially in AMs  -if BS<70, check serum glucose, C peptide, and proinsulin   -Bumex 3 mg bid today, reassess tomorrow    #Chronic systolic heart failure 2/2 NICM (EF 20%)   #s/p HM II LVAD placement (6/19/17) as DT (obesity)  #Listed status 2E for transplant due to multiple LVAD complications  Stage D. NYHA class III. TTE 1/8/21 at 9400rpm, EF<30%m LVIDd 6.8cm, at least mildly reduced RV function, AoV closed without AI, mild-mod eccentric MR, dilated IVC without collapse. RHC 2/23 RA 7 PA 26/8(15) PCWP 6 Kee C/CO 4.6/2.2 Td CO/CI 5.5/2.6      Fluid status: bumex 3 mg bid, reassess tomorrow   BB: deferred due to recent decompensation/low cardiac output  ACEi/ARB/Afterload reduction: hydralazine 75 TID, isosorbide dinitrate 10 mg TID   Antiplatelet: ASA 81 mg   Anticoagulation: warfarin dosing per pharmacy, INR goal 2-3, INR 2.4 today  SCD: dual chamber ICD  MAP: at goal  LDH: stable, 268                #Carpal tunnel, bilateral R>L s/p surgical bilateral release   Worsening numbness and pain recently, e/o thenar atrophy. Note: paraproteins normal in the past, defer further amyloid workup. S/p bilateral surgical release 2/18.  - weaned off dilaudid 2/24, stopped robaxin per patient   - gabapentin 300, 600, 600 mg at HS (increased 2/23), ceiling dose for  renal function  - APAP scheduled 650 mg q4hr WA     #Polyarticular gout flare  - rheum following, appreciate recs, signed off  - s/p Anakinra subcutaneous x5 days (-2/23), could redose if subsequent flare  - continue allopurinol 300 mg daily    #Symptomatic hypoglycemia  #Hx Sylvester-en-Y  #Diet controlled DMII  Episodes of low BS starting 2/23 with symptoms. No sliding scale insulin since 2/19, no new medications to explain. Stopped lantus several weeks ago.   - discussed with endocrine 2/25, encourage increased protein (eggs, nuts specifically)  - next time BS<70, check serum glucose, C peptide, and pro-insulin    Resolve/chronic issues:  #Non-traumatic fall in the setting of oxycodone  - PT/OT following    #Hx staph hominis bacteremia  Cleared, no concerns for active infection. Off abx since fall 2020 without symptoms of recurrence. No need for surveillance blood cx per transplant ID.    #WALTER on CKD, improved  Admission Cr. 2-2.2. Improved with decreased Bumex as outpatient. Cr now stable 1.6-1.7.  - daily BMP     #Recurrent bronchitis, resolved  #Rhinovirus, symptoms resolved  Started on cefdinir 2/5 plan was for 10d course (he has does this course frequently in the past). COVID negative 2/8. Worsening rhinorrhea and congestion 2/12. Resp PCR +rhinovirus. CXR 2/12 with atelectasis, no PNA. Stopped cefdinir 2/15.   - Muccinex prn  - repeat RVP ~3/5 per transplant ID     #H/o atrial fibrillation on warfarin  #H/o VT/VF on amiodarone  Currently in NSR.   - warfarin as above  - amiodarone 200 mg daily     #COPD/Asthma: pta Breo Ellipta, albuterol prn  #Depression: pta Bupropion     Diet: 2g Na 2L FR  DVT Prophylaxis: warfarin  Code Status: full code  Lines: DAWSON Wilder DNP, NP-C  Advanced Heart Failure/Cardiology II Service  Pager [phone] [facility] [phone]    ================================================================    Subjective/24-Hr Events:   Last 24 hr care team notes reviewed. No overnight events. Pain  "is better controlled. Up 1 lb today but denies symptoms of fluid. Tolerating therapies. Gout may be flaring up. No new concerns.     ROS:  4 point ROS including respiratory, CV, GI and  (other than that noted in the HPI) is negative.     Medications: Reviewed in EPIC.     Physical Exam:   BP (!) 83/72 (BP Location: Left arm)   Pulse 61   Temp 98.3  F (36.8  C) (Oral)   Resp 16   Ht 1.727 m (5' 8\")   Wt 99.8 kg (220 lb)   SpO2 99%   BMI 33.45 kg/m      GENERAL: Appears comfortable, in no distress.  HEENT: Eye symmetrical, no discharge or icterus bilaterally. Mucous membranes moist and without lesions.  NECK: Supple, JVD not visible at 75 degrees.   CV: +mechanical LVAD hum.  RESPIRATORY: Respirations regular, even, and unlabored. Lungs CTA throughout.    GI: Soft and non distended with normoactive bowel sounds present in all quadrants. No tenderness, rebound, guarding.   EXTREMITIES: Trace BLE peripheral edema. Non pulsatile.   NEUROLOGIC: Alert and oriented x 3. No focal deficits.   MUSCULOSKELETAL: No joint swelling or tenderness.   SKIN: No jaundice. No rashes or lesions.     Labs:  CMP  Recent Labs   Lab 02/26/21  0558 02/25/21  0534 02/24/21  0610 02/23/21  0614    135 135 135   POTASSIUM 4.0 4.1 3.6 3.8   CHLORIDE 102 104 103 102   CO2 28 26 26 28   ANIONGAP 5 6 7 5   GLC 90 121* 91 101*   BUN 40* 37* 36* 36*   CR 1.80* 1.69* 1.91* 1.68*   GFRESTIMATED 39* 42* 36* 42*   GFRESTBLACK 45* 49* 42* 49*   QAMAR 8.3* 8.5 8.7 8.6   MAG 2.3 2.3 2.3 2.3   PROTTOTAL  --   --   --  6.4*   ALBUMIN  --   --   --  3.3*   BILITOTAL  --   --   --  0.6   ALKPHOS  --   --   --  96   AST  --   --   --  32   ALT  --   --   --  33       CBC  Recent Labs   Lab 02/25/21  0534 02/23/21  0614 02/21/21  0535   WBC 5.5 5.9 5.1   RBC 3.92* 3.85* 4.07*   HGB 10.6* 10.8* 11.2*   HCT 35.6* 34.7* 37.0*   MCV 91 90 91   MCH 27.0 28.1 27.5   MCHC 29.8* 31.1* 30.3*   RDW 19.5* 19.0* 18.8*    268 266       INR  Recent Labs "   Lab 02/26/21  0558 02/25/21  0534 02/24/21  0610 02/23/21  0614   INR 2.79* 2.59* 2.51* 2.74*       Time/Communication  I personally spent a total of 25 minutes. Of that 15 minutes was counseling/coordination of patient's care. Plan of care discussed with patient. See my note above for details.    Patient discussed with Dr. Bautista.

## 2021-02-26 NOTE — PLAN OF CARE
D: Admitted 2/8 with HF (EF 20%) exacerbation. S/p bilat surgical release of carpal tunnel 2/18. Hx of NICM c/b VT s/p CRT-D s/p HM2 LVAD 6/2017, afib, CKD, DM2, COPD/asthma, hx of Sylvester-en-Y     I/A: A/Ox4. VSS on RA. SR with 1* AVB + BBB and occasional PVCs on tele. LVAD #'s WNL, no alarms, dressing daily CDI. Endorses shooting bilat hand/finger pain managed with carroll tylenol. Sutures in place, pt not to lift >2 lbs, reduced dexterity noted. Tolerating 2gNa diet, no FR ordered. Primapore to L knee per WOC. Voiding adequately. Up ad francisco.    P: Awaiting heart transplant, listed as status 2E thru 3/9. PO diuresis. If BS<70, notify provider for lab draw. Continue to monitor and notify Cards 2 with changes/concerns.

## 2021-02-26 NOTE — PROGRESS NOTES
CLINICAL NUTRITION SERVICES    Reason for Assessment:  Nutrition education regarding conversation with provider. Per provider, endo rec pt avoid added sugar and consume protein at meals, especially for breakfast.     Diet History:  See prior nutrition note.     RNY RITCHIE in 2003, DM 2. Hgb A1c of 6.2% on 1/7/21. BG of 68 mg/dL on 2/11 (11:31), 70 on 2/22 (12:16), 58 on 2/23 (19:19), 73 on 2/24 (12:18) and 63 on 2/25 (11:13).     Nutrition Diagnosis:  No nutrition education dx.     Interventions:  Nutrition Education: Explained recommendations for consuming protein at meals, especially at breakfast. Explained examples of protein-containing items. Per pt, always having protein for lunch and supper and sometimes for breakfast. Rec to limit added sugar, limit excessive carbohydrate intake. Per pt, he usually uses a sugar substitute but used brown sugar one day.     Medical food supplement therapy: Pt expressed interest in a protein shake. Suggested having this in the morning (rec protein especially in the morning, more ideally at ~10:00). Pt would like oral supplement in the afternoon. Ordered strawberry Boost Glucose Control at 14:00 as per pt.      Goals:   1. Patient will limited added sugar.  2. Patient will consume protein at all meals, especially at breakfast.    Follow-up:    Patient to ask any further nutrition-related questions before discharge. In addition, pt may request outpatient RD appointment.     Sana Costello, MS, RD, LD, [facility]   6C Pgr: [phone]

## 2021-02-26 NOTE — PLAN OF CARE
OT 6C: Cancel, pt reporting increased SOB today while ambulating in hallways and wanting to discuss with MD before doing further therapy. Reschedule.

## 2021-02-27 LAB
ANION GAP SERPL CALCULATED.3IONS-SCNC: 4 MMOL/L (ref 3–14)
BUN SERPL-MCNC: 42 MG/DL (ref 7–30)
CALCIUM SERPL-MCNC: 8.4 MG/DL (ref 8.5–10.1)
CHLORIDE SERPL-SCNC: 106 MMOL/L (ref 94–109)
CO2 SERPL-SCNC: 28 MMOL/L (ref 20–32)
CREAT SERPL-MCNC: 1.88 MG/DL (ref 0.66–1.25)
ERYTHROCYTE [DISTWIDTH] IN BLOOD BY AUTOMATED COUNT: 19.3 % (ref 10–15)
GFR SERPL CREATININE-BSD FRML MDRD: 37 ML/MIN/{1.73_M2}
GLUCOSE BLDC GLUCOMTR-MCNC: 114 MG/DL (ref 70–99)
GLUCOSE BLDC GLUCOMTR-MCNC: 156 MG/DL (ref 70–99)
GLUCOSE BLDC GLUCOMTR-MCNC: 82 MG/DL (ref 70–99)
GLUCOSE SERPL-MCNC: 106 MG/DL (ref 70–99)
HCT VFR BLD AUTO: 33.1 % (ref 40–53)
HGB BLD-MCNC: 10.1 G/DL (ref 13.3–17.7)
INR PPP: 2.49 (ref 0.86–1.14)
LDH SERPL L TO P-CCNC: 278 U/L (ref 85–227)
MAGNESIUM SERPL-MCNC: 2.2 MG/DL (ref 1.6–2.3)
MCH RBC QN AUTO: 28.2 PG (ref 26.5–33)
MCHC RBC AUTO-ENTMCNC: 30.5 G/DL (ref 31.5–36.5)
MCV RBC AUTO: 93 FL (ref 78–100)
PLATELET # BLD AUTO: 263 10E9/L (ref 150–450)
POTASSIUM SERPL-SCNC: 3.8 MMOL/L (ref 3.4–5.3)
RBC # BLD AUTO: 3.58 10E12/L (ref 4.4–5.9)
SODIUM SERPL-SCNC: 138 MMOL/L (ref 133–144)
WBC # BLD AUTO: 5.2 10E9/L (ref 4–11)

## 2021-02-27 PROCEDURE — 80048 BASIC METABOLIC PNL TOTAL CA: CPT | Performed by: STUDENT IN AN ORGANIZED HEALTH CARE EDUCATION/TRAINING PROGRAM

## 2021-02-27 PROCEDURE — 999N001017 HC STATISTIC GLUCOSE BY METER IP

## 2021-02-27 PROCEDURE — 214N000001 HC R&B CCU UMMC

## 2021-02-27 PROCEDURE — 83735 ASSAY OF MAGNESIUM: CPT | Performed by: STUDENT IN AN ORGANIZED HEALTH CARE EDUCATION/TRAINING PROGRAM

## 2021-02-27 PROCEDURE — 83615 LACTATE (LD) (LDH) ENZYME: CPT | Performed by: STUDENT IN AN ORGANIZED HEALTH CARE EDUCATION/TRAINING PROGRAM

## 2021-02-27 PROCEDURE — 250N000013 HC RX MED GY IP 250 OP 250 PS 637: Performed by: NURSE PRACTITIONER

## 2021-02-27 PROCEDURE — 99207 PR NO BILLABLE SERVICE THIS VISIT: CPT | Performed by: INTERNAL MEDICINE

## 2021-02-27 PROCEDURE — 85027 COMPLETE CBC AUTOMATED: CPT | Performed by: STUDENT IN AN ORGANIZED HEALTH CARE EDUCATION/TRAINING PROGRAM

## 2021-02-27 PROCEDURE — 85610 PROTHROMBIN TIME: CPT | Performed by: STUDENT IN AN ORGANIZED HEALTH CARE EDUCATION/TRAINING PROGRAM

## 2021-02-27 PROCEDURE — 250N000013 HC RX MED GY IP 250 OP 250 PS 637: Performed by: INTERNAL MEDICINE

## 2021-02-27 PROCEDURE — 99232 SBSQ HOSP IP/OBS MODERATE 35: CPT | Mod: 25 | Performed by: NURSE PRACTITIONER

## 2021-02-27 PROCEDURE — 250N000013 HC RX MED GY IP 250 OP 250 PS 637: Performed by: STUDENT IN AN ORGANIZED HEALTH CARE EDUCATION/TRAINING PROGRAM

## 2021-02-27 PROCEDURE — 93750 INTERROGATION VAD IN PERSON: CPT | Performed by: NURSE PRACTITIONER

## 2021-02-27 PROCEDURE — 36415 COLL VENOUS BLD VENIPUNCTURE: CPT | Performed by: STUDENT IN AN ORGANIZED HEALTH CARE EDUCATION/TRAINING PROGRAM

## 2021-02-27 RX ORDER — WARFARIN SODIUM 7.5 MG/1
7.5 TABLET ORAL
Status: COMPLETED | OUTPATIENT
Start: 2021-02-27 | End: 2021-02-27

## 2021-02-27 RX ADMIN — ISOSORBIDE DINITRATE 10 MG: 10 TABLET ORAL at 14:15

## 2021-02-27 RX ADMIN — ACETAMINOPHEN 650 MG: 325 TABLET, FILM COATED ORAL at 20:10

## 2021-02-27 RX ADMIN — ISOSORBIDE DINITRATE 10 MG: 10 TABLET ORAL at 08:39

## 2021-02-27 RX ADMIN — AMIODARONE HYDROCHLORIDE 200 MG: 200 TABLET ORAL at 08:38

## 2021-02-27 RX ADMIN — Medication 10 MG: at 23:21

## 2021-02-27 RX ADMIN — WARFARIN SODIUM 7.5 MG: 7.5 TABLET ORAL at 18:31

## 2021-02-27 RX ADMIN — ALBUTEROL SULFATE 2 PUFF: 90 AEROSOL, METERED RESPIRATORY (INHALATION) at 23:19

## 2021-02-27 RX ADMIN — ACETAMINOPHEN 650 MG: 325 TABLET, FILM COATED ORAL at 16:09

## 2021-02-27 RX ADMIN — BUMETANIDE 3 MG: 2 TABLET ORAL at 08:38

## 2021-02-27 RX ADMIN — UMECLIDINIUM 1 PUFF: 62.5 AEROSOL, POWDER ORAL at 08:37

## 2021-02-27 RX ADMIN — HYDRALAZINE HYDROCHLORIDE 75 MG: 25 TABLET, FILM COATED ORAL at 20:10

## 2021-02-27 RX ADMIN — ALLOPURINOL 300 MG: 300 TABLET ORAL at 08:39

## 2021-02-27 RX ADMIN — GABAPENTIN 600 MG: 300 CAPSULE ORAL at 14:15

## 2021-02-27 RX ADMIN — GABAPENTIN 300 MG: 300 CAPSULE ORAL at 08:39

## 2021-02-27 RX ADMIN — ISOSORBIDE DINITRATE 10 MG: 10 TABLET ORAL at 20:10

## 2021-02-27 RX ADMIN — MONTELUKAST 10 MG: 10 TABLET, FILM COATED ORAL at 22:07

## 2021-02-27 RX ADMIN — POTASSIUM CHLORIDE 60 MEQ: 750 TABLET, EXTENDED RELEASE ORAL at 08:38

## 2021-02-27 RX ADMIN — FLUTICASONE FUROATE AND VILANTEROL TRIFENATATE 1 PUFF: 100; 25 POWDER RESPIRATORY (INHALATION) at 08:37

## 2021-02-27 RX ADMIN — HYDRALAZINE HYDROCHLORIDE 75 MG: 25 TABLET, FILM COATED ORAL at 14:15

## 2021-02-27 RX ADMIN — ACETAMINOPHEN 650 MG: 325 TABLET, FILM COATED ORAL at 12:26

## 2021-02-27 RX ADMIN — GABAPENTIN 600 MG: 300 CAPSULE ORAL at 22:07

## 2021-02-27 RX ADMIN — ACETAMINOPHEN 650 MG: 325 TABLET, FILM COATED ORAL at 08:38

## 2021-02-27 RX ADMIN — BUPROPION HYDROCHLORIDE 150 MG: 150 TABLET, EXTENDED RELEASE ORAL at 08:39

## 2021-02-27 RX ADMIN — HYDRALAZINE HYDROCHLORIDE 75 MG: 25 TABLET, FILM COATED ORAL at 08:38

## 2021-02-27 RX ADMIN — BUMETANIDE 3 MG: 2 TABLET ORAL at 16:09

## 2021-02-27 RX ADMIN — ASPIRIN 81 MG CHEWABLE TABLET 81 MG: 81 TABLET CHEWABLE at 08:39

## 2021-02-27 ASSESSMENT — ACTIVITIES OF DAILY LIVING (ADL)
ADLS_ACUITY_SCORE: 12

## 2021-02-27 ASSESSMENT — MIFFLIN-ST. JEOR: SCORE: 1763.78

## 2021-02-27 NOTE — PLAN OF CARE
Major Shift Events:  LVAD numbers WNL- No alarms, pt ambulated hallways x1 and had increased SOB- Chest xray ordered.  Lower end BG today (80,90). Eating well, no issues  Plan: Continue to monitor, status 2E for heart transplant  For vital signs and complete assessments, please see documentation flowsheets.

## 2021-02-27 NOTE — PLAN OF CARE
D: Admitted 2/8 with HF (EF 20%) exacerbation. S/p bilat surgical release of carpal tunnel 2/18. Hx of NICM c/b VT s/p CRT-D s/p HM2 LVAD 6/2017, afib, CKD, DM2, COPD/asthma, hx of Sylvester-en-Y     I/A: A/Ox4. VSS on RA. SR with 1* AVB on tele. LVAD #'s WNL except for flow 7s-8s, no alarms, dressing daily CDI. Endorses wrist pain managed with carroll tylenol. Sutures in place, pt not to lift >2 lbs. Tolerating 3gNa diet. Voiding adequately. Up ad francisco.    P: Awaiting heart transplant, listed as status 2E thru 3/9. PO diuresis. If BS<70, notify provider for lab draw. Continue to monitor and notify Cards 2 with changes/concerns.

## 2021-02-28 LAB
ANION GAP SERPL CALCULATED.3IONS-SCNC: 6 MMOL/L (ref 3–14)
BUN SERPL-MCNC: 42 MG/DL (ref 7–30)
C PNEUM DNA SPEC QL NAA+PROBE: NOT DETECTED
CALCIUM SERPL-MCNC: 8.3 MG/DL (ref 8.5–10.1)
CHLORIDE SERPL-SCNC: 103 MMOL/L (ref 94–109)
CO2 SERPL-SCNC: 28 MMOL/L (ref 20–32)
CREAT SERPL-MCNC: 1.88 MG/DL (ref 0.66–1.25)
FLUAV H1 2009 PAND RNA SPEC QL NAA+PROBE: NOT DETECTED
FLUAV H1 RNA SPEC QL NAA+PROBE: NOT DETECTED
FLUAV H3 RNA SPEC QL NAA+PROBE: NOT DETECTED
FLUAV RNA RESP QL NAA+PROBE: NEGATIVE
FLUAV RNA SPEC QL NAA+PROBE: NOT DETECTED
FLUBV RNA RESP QL NAA+PROBE: NEGATIVE
FLUBV RNA SPEC QL NAA+PROBE: NOT DETECTED
GFR SERPL CREATININE-BSD FRML MDRD: 37 ML/MIN/{1.73_M2}
GLUCOSE BLDC GLUCOMTR-MCNC: 110 MG/DL (ref 70–99)
GLUCOSE BLDC GLUCOMTR-MCNC: 115 MG/DL (ref 70–99)
GLUCOSE BLDC GLUCOMTR-MCNC: 176 MG/DL (ref 70–99)
GLUCOSE SERPL-MCNC: 92 MG/DL (ref 70–99)
HADV DNA SPEC QL NAA+PROBE: NOT DETECTED
HCOV PNL SPEC NAA+PROBE: NOT DETECTED
HMPV RNA SPEC QL NAA+PROBE: NOT DETECTED
HPIV1 RNA SPEC QL NAA+PROBE: NOT DETECTED
HPIV2 RNA SPEC QL NAA+PROBE: NOT DETECTED
HPIV3 RNA SPEC QL NAA+PROBE: NOT DETECTED
HPIV4 RNA SPEC QL NAA+PROBE: NOT DETECTED
INR PPP: 1.94 (ref 0.86–1.14)
LABORATORY COMMENT REPORT: NORMAL
LDH SERPL L TO P-CCNC: 284 U/L (ref 85–227)
M PNEUMO DNA SPEC QL NAA+PROBE: NOT DETECTED
MAGNESIUM SERPL-MCNC: 2.2 MG/DL (ref 1.6–2.3)
MICROBIOLOGIST REVIEW: NORMAL
POTASSIUM SERPL-SCNC: 3.9 MMOL/L (ref 3.4–5.3)
RSV RNA SPEC QL NAA+PROBE: NEGATIVE
RSV RNA SPEC QL NAA+PROBE: NOT DETECTED
RSV RNA SPEC QL NAA+PROBE: NOT DETECTED
RV+EV RNA SPEC QL NAA+PROBE: NOT DETECTED
SARS-COV-2 RNA RESP QL NAA+PROBE: NEGATIVE
SODIUM SERPL-SCNC: 137 MMOL/L (ref 133–144)
SPECIMEN SOURCE: NORMAL

## 2021-02-28 PROCEDURE — 93750 INTERROGATION VAD IN PERSON: CPT | Performed by: NURSE PRACTITIONER

## 2021-02-28 PROCEDURE — 85610 PROTHROMBIN TIME: CPT | Performed by: STUDENT IN AN ORGANIZED HEALTH CARE EDUCATION/TRAINING PROGRAM

## 2021-02-28 PROCEDURE — 83615 LACTATE (LD) (LDH) ENZYME: CPT | Performed by: STUDENT IN AN ORGANIZED HEALTH CARE EDUCATION/TRAINING PROGRAM

## 2021-02-28 PROCEDURE — 999N001017 HC STATISTIC GLUCOSE BY METER IP

## 2021-02-28 PROCEDURE — 87581 M.PNEUMON DNA AMP PROBE: CPT | Performed by: NURSE PRACTITIONER

## 2021-02-28 PROCEDURE — 36415 COLL VENOUS BLD VENIPUNCTURE: CPT | Performed by: STUDENT IN AN ORGANIZED HEALTH CARE EDUCATION/TRAINING PROGRAM

## 2021-02-28 PROCEDURE — 250N000013 HC RX MED GY IP 250 OP 250 PS 637: Performed by: NURSE PRACTITIONER

## 2021-02-28 PROCEDURE — 99232 SBSQ HOSP IP/OBS MODERATE 35: CPT | Mod: 25 | Performed by: NURSE PRACTITIONER

## 2021-02-28 PROCEDURE — 250N000013 HC RX MED GY IP 250 OP 250 PS 637: Performed by: INTERNAL MEDICINE

## 2021-02-28 PROCEDURE — 87486 CHLMYD PNEUM DNA AMP PROBE: CPT | Performed by: NURSE PRACTITIONER

## 2021-02-28 PROCEDURE — 250N000013 HC RX MED GY IP 250 OP 250 PS 637: Performed by: STUDENT IN AN ORGANIZED HEALTH CARE EDUCATION/TRAINING PROGRAM

## 2021-02-28 PROCEDURE — 87636 SARSCOV2 & INF A&B AMP PRB: CPT | Performed by: NURSE PRACTITIONER

## 2021-02-28 PROCEDURE — 83735 ASSAY OF MAGNESIUM: CPT | Performed by: STUDENT IN AN ORGANIZED HEALTH CARE EDUCATION/TRAINING PROGRAM

## 2021-02-28 PROCEDURE — 214N000001 HC R&B CCU UMMC

## 2021-02-28 PROCEDURE — 87633 RESP VIRUS 12-25 TARGETS: CPT | Performed by: NURSE PRACTITIONER

## 2021-02-28 PROCEDURE — 80048 BASIC METABOLIC PNL TOTAL CA: CPT | Performed by: STUDENT IN AN ORGANIZED HEALTH CARE EDUCATION/TRAINING PROGRAM

## 2021-02-28 RX ORDER — BUMETANIDE 2 MG/1
4 TABLET ORAL
Status: DISCONTINUED | OUTPATIENT
Start: 2021-02-28 | End: 2021-03-01

## 2021-02-28 RX ORDER — WARFARIN SODIUM 7.5 MG/1
7.5 TABLET ORAL
Status: COMPLETED | OUTPATIENT
Start: 2021-02-28 | End: 2021-02-28

## 2021-02-28 RX ADMIN — ALBUTEROL SULFATE 2 PUFF: 90 AEROSOL, METERED RESPIRATORY (INHALATION) at 08:21

## 2021-02-28 RX ADMIN — AMIODARONE HYDROCHLORIDE 200 MG: 200 TABLET ORAL at 08:23

## 2021-02-28 RX ADMIN — HYDRALAZINE HYDROCHLORIDE 75 MG: 25 TABLET, FILM COATED ORAL at 19:48

## 2021-02-28 RX ADMIN — FLUTICASONE FUROATE AND VILANTEROL TRIFENATATE 1 PUFF: 100; 25 POWDER RESPIRATORY (INHALATION) at 08:20

## 2021-02-28 RX ADMIN — ACETAMINOPHEN 650 MG: 325 TABLET, FILM COATED ORAL at 15:17

## 2021-02-28 RX ADMIN — ISOSORBIDE DINITRATE 10 MG: 10 TABLET ORAL at 19:48

## 2021-02-28 RX ADMIN — HYDRALAZINE HYDROCHLORIDE 75 MG: 25 TABLET, FILM COATED ORAL at 08:23

## 2021-02-28 RX ADMIN — POTASSIUM CHLORIDE 60 MEQ: 750 TABLET, EXTENDED RELEASE ORAL at 08:22

## 2021-02-28 RX ADMIN — UMECLIDINIUM 1 PUFF: 62.5 AEROSOL, POWDER ORAL at 08:21

## 2021-02-28 RX ADMIN — ISOSORBIDE DINITRATE 10 MG: 10 TABLET ORAL at 15:17

## 2021-02-28 RX ADMIN — BUPROPION HYDROCHLORIDE 150 MG: 150 TABLET, EXTENDED RELEASE ORAL at 08:22

## 2021-02-28 RX ADMIN — HYDRALAZINE HYDROCHLORIDE 75 MG: 25 TABLET, FILM COATED ORAL at 15:17

## 2021-02-28 RX ADMIN — MONTELUKAST 10 MG: 10 TABLET, FILM COATED ORAL at 22:21

## 2021-02-28 RX ADMIN — ACETAMINOPHEN 650 MG: 325 TABLET, FILM COATED ORAL at 19:48

## 2021-02-28 RX ADMIN — GABAPENTIN 600 MG: 300 CAPSULE ORAL at 15:17

## 2021-02-28 RX ADMIN — Medication 10 MG: at 22:29

## 2021-02-28 RX ADMIN — ALLOPURINOL 300 MG: 300 TABLET ORAL at 08:23

## 2021-02-28 RX ADMIN — ASPIRIN 81 MG CHEWABLE TABLET 81 MG: 81 TABLET CHEWABLE at 08:23

## 2021-02-28 RX ADMIN — ACETAMINOPHEN 650 MG: 325 TABLET, FILM COATED ORAL at 04:46

## 2021-02-28 RX ADMIN — BUMETANIDE 4 MG: 2 TABLET ORAL at 08:28

## 2021-02-28 RX ADMIN — ACETAMINOPHEN 650 MG: 325 TABLET, FILM COATED ORAL at 11:42

## 2021-02-28 RX ADMIN — WARFARIN SODIUM 7.5 MG: 7.5 TABLET ORAL at 18:21

## 2021-02-28 RX ADMIN — ACETAMINOPHEN 650 MG: 325 TABLET, FILM COATED ORAL at 08:28

## 2021-02-28 RX ADMIN — GABAPENTIN 300 MG: 300 CAPSULE ORAL at 08:23

## 2021-02-28 RX ADMIN — GABAPENTIN 600 MG: 300 CAPSULE ORAL at 22:21

## 2021-02-28 RX ADMIN — ISOSORBIDE DINITRATE 10 MG: 10 TABLET ORAL at 08:23

## 2021-02-28 RX ADMIN — BUMETANIDE 4 MG: 2 TABLET ORAL at 15:17

## 2021-02-28 ASSESSMENT — ACTIVITIES OF DAILY LIVING (ADL)
ADLS_ACUITY_SCORE: 10

## 2021-02-28 ASSESSMENT — MIFFLIN-ST. JEOR: SCORE: 1781.02

## 2021-02-28 NOTE — PHARMACY-ANTICOAGULATION SERVICE
Clinical Pharmacy - Warfarin Dosing Consult --INR goal change     Pharmacy has been consulted to manage this patient s warfarin therapy.  Indication: LVAD/RVAD  Therapy Goal: 2.5-3    INR   Date Value Ref Range Status   02/28/2021 1.94 (H) 0.86 - 1.14 Final   02/27/2021 2.49 (H) 0.86 - 1.14 Final     Pharmacist planned to give 7.5mg by mouth today.    Please contact pharmacy as soon as possible if the warfarin needs to be held for a procedure or if the warfarin goals change.      Shanon Mays, Pharm.D., BCPS

## 2021-02-28 NOTE — PROGRESS NOTES
[facility]   Cardiology II Service / Advanced Heart Failure  Daily Progress Note  Date of Service: 2/28/2021      Patient: Jim Willingham  MRN: [id number]  Admission Date: 2/8/2021  Hospital Day # 20    Assessment and Plan: Jim Willingham is a 65 year old male with history of NICM EF 20% c/b VT s/p CRT-D s/p HMII LVAD 6/19/2017 originally intended as destination therapy 2/2 obesity however with recent weight loss now candidate for transplantation. He is admitted for worsening functional status and renal function concerning for worsening heart failure. He is now listed status 2E for transplant.     Chronic systolic heart failure secondary to NICM s/p HM II LVAD. Echo 1/8/21 at 9400rpm, EF<30%m LVIDd 6.8cm, at least mildly reduced RV function, AoV closed without AI, mild-mod eccentric MR, dilated IVC without collapse. RHC 2/23 RA 7 PA 26/8(15) PCWP 6 Kee C/CO 4.6/2.2 TD CO/CI 5.5/2.6.    Stage D, NYHA Class III  ACEi/ARB contraindicated due to renal dysfunction. Hydralazine 75 mg po TID. Isordil 10 mg po TID.   BB contraindicated due to low cardiac output  Aldosterone antagonist contraindicated due to renal dysfunction  SCD prophylaxis CRT-D  Fluid status Euvolemic. Bumex 3 mg po BID.   MAP: 76  LDH: 284   Anticoagulation: Coumadin per pharmacy. INR- 1.94, goal 2-3.   Antiplatelet: ASA 81 mg po daily  - remains on the cardiac transplant list status 2E.   - log files sent for elevated power spikes and flows.      The patient's HeartMate LVAD was interrogated 2/28/2021  * Speed 9600 rpm   * Pulsatility index 4.3  * Power 6.9 Manuel   * Flow 6.2 L/minute   Fluid status: Euvolemic   Alarms were reviewed, and notable for PI events, elevated garcia with rare speed drops, and elevated flows.   The driveline exit site was covered with dressing clean, dry, and intact.   All external components were inspected and showed no evidence of damage or malfunction.     Carpel tunnel, bilateral s/p bilateral release.  Evidence of thenar atrophy. Relief with steroid injection 11/20. Carpal tunnel release 2/18/21.  - Appreciate Ortho/Plastics consult.  - Off narcotics.   - Scheduled Tylenol 650 mg po QID.   - Gabapentin 300 mg in AM, 600 mg in the afternoon, and 600 mg in the evening.      WALTER on CKD Stage III. Cr peaked at 2.22  - Cr-1.88 (1.88).     History of Afib. History of VT/VF. CHADSVASC-5.   - Continue Amiodarone 200 mg po daily.   - Coumadin as above.      Polyarticular Acute Gout. Minimal improvement with Colchicine times one.   - Appreciate Rheumatology consult.   - Anakinra 100 mg subcutaneous daily for 5 days completed.      DM type II, controlled. Symptomatic Hypoglycemia: Hgb A1C-6.2. Lantus and Novololg sliding scale insulin discontinued.   -  If blood glucose <70, check serum glucose, C peptide, and pro-insulin. Consult Endo if further hypoglycemia.      Rhinovirus, resolved. Bacterial bronchitis. Completed 5 day course of Cefdinir. COVID negative 2/8. Resp PCR +rhinovirus. CXR 2/12 with no overt PNA. Stopped cefdinir 2/15. Cleared for transplant per ID.   - repeat RVP-3/5 per transplant ID  - RVP repeated today and COVID due to sinus congestion.      Chronic/resolved conditions  COPD/Asthma: pta Breo Ellipta, albuterol prn.   Depression: pta Bupropion  Right sided weakness, resolved. CT head negative for acute findings. Appreciate Neuro eval.   Staph Hominis Bacteremia. No need for surveillance blood cx per transplant ID.       FEN: 2 gram sodium diet   PROPHY:  Coumadin  LINES:  PIV   DISPO:  TBD pending cardiac transplant.   CODE STATUS: Full Code   ================================================================  Interval History/ROS: He complains of sinus congestion that started this AM. He denies fever, chills, chest pain, palpitations, cough, nausea, vomiting, diarrhea, melena, hematochezia. He denies concerns for his drive line site. He complains of LE edema and abdominal distention. He is tolerating oral  "intake and ambulating in the halls.     Last 24 hr care team notes reviewed.   ROS:  4 point ROS including Respiratory, CV, GI and , other than that noted in the HPI, is negative.     Medications: Reviewed in EPIC.     Physical Exam:   BP 97/69 (BP Location: Left arm)   Pulse 70   Temp 97.7  F (36.5  C) (Oral)   Resp 16   Ht 1.727 m (5' 8\")   Wt 101.2 kg (223 lb)   SpO2 97%   BMI 33.91 kg/m    GENERAL: Appears alert and oriented times three.   HEENT: Eye symmetrical and free of discharge bilaterally. Mucous membranes moist and without lesions.  NECK: Supple and without lymphadenopathy. JVD 10 cm   CV: RRR, S1S2 present with LVAD hum.   RESPIRATORY: Respirations regular, even, and unlabored. Lungs CTA throughout.   GI: Soft and mildly distended with normoactive bowel sounds present in all quadrants. No tenderness, rebound, guarding. No organomegaly.   EXTREMITIES: +1 bilateral LE peripheral edema. 2+ bilateral pedal pulses.   NEUROLOGIC: Alert and orientated x 3. CN II-XII grossly intact. No focal deficits.   MUSCULOSKELETAL: No joint swelling or tenderness.   SKIN: No jaundice. No rashes or lesions. LVAD drive line covered.     Data:  CMP  Recent Labs   Lab 02/28/21  0634 02/27/21  0531 02/26/21  0558 02/25/21  0534 02/23/21  0614 02/23/21  0614    138 135 135   < > 135   POTASSIUM 3.9 3.8 4.0 4.1   < > 3.8   CHLORIDE 103 106 102 104   < > 102   CO2 28 28 28 26   < > 28   ANIONGAP 6 4 5 6   < > 5   GLC 92 106* 90 121*   < > 101*   BUN 42* 42* 40* 37*   < > 36*   CR 1.88* 1.88* 1.80* 1.69*   < > 1.68*   GFRESTIMATED 37* 37* 39* 42*   < > 42*   GFRESTBLACK 43* 43* 45* 49*   < > 49*   QAMAR 8.3* 8.4* 8.3* 8.5   < > 8.6   MAG 2.2 2.2 2.3 2.3   < > 2.3   PROTTOTAL  --   --  6.3*  --   --  6.4*   ALBUMIN  --   --  3.3*  --   --  3.3*   BILITOTAL  --   --  0.6  --   --  0.6   ALKPHOS  --   --  104  --   --  96   AST  --   --  42  --   --  32   ALT  --   --  38  --   --  33    < > = values in this interval not " displayed.     CBC  Recent Labs   Lab 02/27/21  0531 02/25/21  0534 02/23/21  0614   WBC 5.2 5.5 5.9   RBC 3.58* 3.92* 3.85*   HGB 10.1* 10.6* 10.8*   HCT 33.1* 35.6* 34.7*   MCV 93 91 90   MCH 28.2 27.0 28.1   MCHC 30.5* 29.8* 31.1*   RDW 19.3* 19.5* 19.0*    296 268     INR  Recent Labs   Lab 02/28/21  0634 02/27/21  0531 02/26/21  0558 02/25/21  0534   INR 1.94* 2.49* 2.79* 2.59*       Patient discussed with Dr. Bautista.      Soraya Marshall [facility]  2/28/2021

## 2021-02-28 NOTE — PLAN OF CARE
Major Shift Events:  Pt ambulated hallway 4 times today w/no SOB.  BG checks remained stable.  Good UO.  Some elevated PI and flow numbers for LVAD - team aware and watching closely.   Plan: Continue to monitor closely.  2E heart transplant status  For vital signs and complete assessments, please see documentation flowsheets.

## 2021-02-28 NOTE — PLAN OF CARE
D: Admitted 2/8 with HF (EF 20%) exacerbation. S/p bilat surgical release of carpal tunnel 2/18. Hx of NICM c/b VT s/p CRT-D s/p HM2 LVAD 6/2017, afib, CKD, DM2, COPD/asthma, hx of Sylvester-en-Y     I/A: A/Ox4. VSS on RA. SR with 1* AVB on tele. LVAD #'s elevated at times. MAPS WNL. Dressing changed late last evening, site CDI. Endorses wrist pain managed with carroll tylenol. Sutures in place, pt not to lift >2 lbs. Tolerating 3gNa diet. Voiding adequately. Up ad francisco. Weight trending up this am, rechecked x2.    P: Awaiting heart transplant, listed as status 2E. PO diuresis. If BS<70, notify provider for lab draw. Continue to monitor and notify Cards 2 with changes/concerns.

## 2021-03-01 ENCOUNTER — APPOINTMENT (OUTPATIENT)
Dept: OCCUPATIONAL THERAPY | Facility: CLINIC | Age: 66
DRG: 001 | End: 2021-03-01
Attending: INTERNAL MEDICINE
Payer: COMMERCIAL

## 2021-03-01 LAB
ALBUMIN SERPL-MCNC: 3.1 G/DL (ref 3.4–5)
ALP SERPL-CCNC: 95 U/L (ref 40–150)
ALT SERPL W P-5'-P-CCNC: 40 U/L (ref 0–70)
ANION GAP SERPL CALCULATED.3IONS-SCNC: 7 MMOL/L (ref 3–14)
AST SERPL W P-5'-P-CCNC: 37 U/L (ref 0–45)
BILIRUB DIRECT SERPL-MCNC: 0.1 MG/DL (ref 0–0.2)
BILIRUB SERPL-MCNC: 0.4 MG/DL (ref 0.2–1.3)
BUN SERPL-MCNC: 42 MG/DL (ref 7–30)
CALCIUM SERPL-MCNC: 8.3 MG/DL (ref 8.5–10.1)
CHLORIDE SERPL-SCNC: 103 MMOL/L (ref 94–109)
CO2 SERPL-SCNC: 27 MMOL/L (ref 20–32)
CREAT SERPL-MCNC: 1.64 MG/DL (ref 0.66–1.25)
ERYTHROCYTE [DISTWIDTH] IN BLOOD BY AUTOMATED COUNT: 19.5 % (ref 10–15)
GFR SERPL CREATININE-BSD FRML MDRD: 44 ML/MIN/{1.73_M2}
GLUCOSE BLDC GLUCOMTR-MCNC: 115 MG/DL (ref 70–99)
GLUCOSE BLDC GLUCOMTR-MCNC: 156 MG/DL (ref 70–99)
GLUCOSE BLDC GLUCOMTR-MCNC: 79 MG/DL (ref 70–99)
GLUCOSE SERPL-MCNC: 92 MG/DL (ref 70–99)
HCT VFR BLD AUTO: 33.4 % (ref 40–53)
HGB BLD-MCNC: 10.2 G/DL (ref 13.3–17.7)
INR PPP: 1.95 (ref 0.86–1.14)
LDH SERPL L TO P-CCNC: 291 U/L (ref 85–227)
MAGNESIUM SERPL-MCNC: 2.4 MG/DL (ref 1.6–2.3)
MCH RBC QN AUTO: 28.5 PG (ref 26.5–33)
MCHC RBC AUTO-ENTMCNC: 30.5 G/DL (ref 31.5–36.5)
MCV RBC AUTO: 93 FL (ref 78–100)
PLATELET # BLD AUTO: 272 10E9/L (ref 150–450)
POTASSIUM SERPL-SCNC: 3.8 MMOL/L (ref 3.4–5.3)
PROT SERPL-MCNC: 6.1 G/DL (ref 6.8–8.8)
RBC # BLD AUTO: 3.58 10E12/L (ref 4.4–5.9)
SODIUM SERPL-SCNC: 136 MMOL/L (ref 133–144)
WBC # BLD AUTO: 5.5 10E9/L (ref 4–11)

## 2021-03-01 PROCEDURE — 214N000001 HC R&B CCU UMMC

## 2021-03-01 PROCEDURE — 97110 THERAPEUTIC EXERCISES: CPT | Mod: GO

## 2021-03-01 PROCEDURE — 83615 LACTATE (LD) (LDH) ENZYME: CPT | Performed by: STUDENT IN AN ORGANIZED HEALTH CARE EDUCATION/TRAINING PROGRAM

## 2021-03-01 PROCEDURE — 250N000013 HC RX MED GY IP 250 OP 250 PS 637: Performed by: INTERNAL MEDICINE

## 2021-03-01 PROCEDURE — 80048 BASIC METABOLIC PNL TOTAL CA: CPT | Performed by: STUDENT IN AN ORGANIZED HEALTH CARE EDUCATION/TRAINING PROGRAM

## 2021-03-01 PROCEDURE — 80076 HEPATIC FUNCTION PANEL: CPT | Performed by: STUDENT IN AN ORGANIZED HEALTH CARE EDUCATION/TRAINING PROGRAM

## 2021-03-01 PROCEDURE — 250N000013 HC RX MED GY IP 250 OP 250 PS 637: Performed by: NURSE PRACTITIONER

## 2021-03-01 PROCEDURE — 99232 SBSQ HOSP IP/OBS MODERATE 35: CPT | Mod: 25 | Performed by: NURSE PRACTITIONER

## 2021-03-01 PROCEDURE — 85610 PROTHROMBIN TIME: CPT | Performed by: STUDENT IN AN ORGANIZED HEALTH CARE EDUCATION/TRAINING PROGRAM

## 2021-03-01 PROCEDURE — 36415 COLL VENOUS BLD VENIPUNCTURE: CPT | Performed by: STUDENT IN AN ORGANIZED HEALTH CARE EDUCATION/TRAINING PROGRAM

## 2021-03-01 PROCEDURE — 999N001017 HC STATISTIC GLUCOSE BY METER IP

## 2021-03-01 PROCEDURE — 85027 COMPLETE CBC AUTOMATED: CPT | Performed by: STUDENT IN AN ORGANIZED HEALTH CARE EDUCATION/TRAINING PROGRAM

## 2021-03-01 PROCEDURE — 250N000013 HC RX MED GY IP 250 OP 250 PS 637: Performed by: STUDENT IN AN ORGANIZED HEALTH CARE EDUCATION/TRAINING PROGRAM

## 2021-03-01 PROCEDURE — 83735 ASSAY OF MAGNESIUM: CPT | Performed by: STUDENT IN AN ORGANIZED HEALTH CARE EDUCATION/TRAINING PROGRAM

## 2021-03-01 PROCEDURE — 97530 THERAPEUTIC ACTIVITIES: CPT | Mod: GO

## 2021-03-01 PROCEDURE — 93750 INTERROGATION VAD IN PERSON: CPT | Performed by: NURSE PRACTITIONER

## 2021-03-01 RX ORDER — BUMETANIDE 2 MG/1
4 TABLET ORAL
Status: DISCONTINUED | OUTPATIENT
Start: 2021-03-01 | End: 2021-03-03

## 2021-03-01 RX ORDER — LORATADINE 10 MG/1
10 TABLET ORAL ONCE
Status: COMPLETED | OUTPATIENT
Start: 2021-03-01 | End: 2021-03-01

## 2021-03-01 RX ORDER — WARFARIN SODIUM 10 MG/1
10 TABLET ORAL
Status: COMPLETED | OUTPATIENT
Start: 2021-03-01 | End: 2021-03-01

## 2021-03-01 RX ADMIN — WARFARIN SODIUM 10 MG: 10 TABLET ORAL at 17:50

## 2021-03-01 RX ADMIN — BUPROPION HYDROCHLORIDE 150 MG: 150 TABLET, EXTENDED RELEASE ORAL at 07:56

## 2021-03-01 RX ADMIN — FLUTICASONE FUROATE AND VILANTEROL TRIFENATATE 1 PUFF: 100; 25 POWDER RESPIRATORY (INHALATION) at 07:56

## 2021-03-01 RX ADMIN — UMECLIDINIUM 1 PUFF: 62.5 AEROSOL, POWDER ORAL at 07:56

## 2021-03-01 RX ADMIN — MONTELUKAST 10 MG: 10 TABLET, FILM COATED ORAL at 21:47

## 2021-03-01 RX ADMIN — HYDRALAZINE HYDROCHLORIDE 75 MG: 25 TABLET, FILM COATED ORAL at 07:56

## 2021-03-01 RX ADMIN — GABAPENTIN 600 MG: 300 CAPSULE ORAL at 14:19

## 2021-03-01 RX ADMIN — INSULIN ASPART 1 UNITS: 100 INJECTION, SOLUTION INTRAVENOUS; SUBCUTANEOUS at 17:50

## 2021-03-01 RX ADMIN — ACETAMINOPHEN 650 MG: 325 TABLET, FILM COATED ORAL at 05:11

## 2021-03-01 RX ADMIN — GABAPENTIN 300 MG: 300 CAPSULE ORAL at 07:56

## 2021-03-01 RX ADMIN — BUMETANIDE 3 MG: 2 TABLET ORAL at 09:30

## 2021-03-01 RX ADMIN — BUMETANIDE 4 MG: 2 TABLET ORAL at 15:01

## 2021-03-01 RX ADMIN — HYDRALAZINE HYDROCHLORIDE 75 MG: 25 TABLET, FILM COATED ORAL at 19:46

## 2021-03-01 RX ADMIN — ISOSORBIDE DINITRATE 10 MG: 10 TABLET ORAL at 14:19

## 2021-03-01 RX ADMIN — LORATADINE 10 MG: 10 TABLET ORAL at 11:21

## 2021-03-01 RX ADMIN — ALLOPURINOL 300 MG: 300 TABLET ORAL at 07:56

## 2021-03-01 RX ADMIN — Medication 10 MG: at 21:48

## 2021-03-01 RX ADMIN — ALBUTEROL SULFATE 2 PUFF: 90 AEROSOL, METERED RESPIRATORY (INHALATION) at 08:04

## 2021-03-01 RX ADMIN — ISOSORBIDE DINITRATE 10 MG: 10 TABLET ORAL at 19:46

## 2021-03-01 RX ADMIN — ACETAMINOPHEN 650 MG: 325 TABLET, FILM COATED ORAL at 19:39

## 2021-03-01 RX ADMIN — ACETAMINOPHEN 650 MG: 325 TABLET, FILM COATED ORAL at 11:21

## 2021-03-01 RX ADMIN — ACETAMINOPHEN 650 MG: 325 TABLET, FILM COATED ORAL at 15:01

## 2021-03-01 RX ADMIN — GABAPENTIN 600 MG: 300 CAPSULE ORAL at 21:46

## 2021-03-01 RX ADMIN — HYDRALAZINE HYDROCHLORIDE 75 MG: 25 TABLET, FILM COATED ORAL at 14:19

## 2021-03-01 RX ADMIN — AMIODARONE HYDROCHLORIDE 200 MG: 200 TABLET ORAL at 07:56

## 2021-03-01 RX ADMIN — ISOSORBIDE DINITRATE 10 MG: 10 TABLET ORAL at 07:56

## 2021-03-01 RX ADMIN — ASPIRIN 81 MG CHEWABLE TABLET 81 MG: 81 TABLET CHEWABLE at 07:56

## 2021-03-01 RX ADMIN — POTASSIUM CHLORIDE 60 MEQ: 750 TABLET, EXTENDED RELEASE ORAL at 07:56

## 2021-03-01 ASSESSMENT — ACTIVITIES OF DAILY LIVING (ADL)
ADLS_ACUITY_SCORE: 12
ADLS_ACUITY_SCORE: 10

## 2021-03-01 ASSESSMENT — MIFFLIN-ST. JEOR: SCORE: 1772.86

## 2021-03-01 NOTE — PLAN OF CARE
7p-11p: Pt in room talking to family on the phone and watching Netflix. VSS, VAD WDL. Voided in urinal. Independent in room. Continue with POC.   detailed exam

## 2021-03-01 NOTE — PROGRESS NOTES
"Plastic Surgery Progress Note  03/01/21    S: Doing well. He is POD11 from bilateral carpal tunnel release. Numbness much improved, some residual along the index and long finger.  strength improved. No issues with sutures.     O: BP (!) 73/57 (BP Location: Right arm)   Pulse 80   Temp 98  F (36.7  C) (Oral)   Resp 16   Ht 1.727 m (5' 8\")   Wt 100.3 kg (221 lb 3.2 oz)   SpO2 99%   BMI 33.63 kg/m    Sitting up in bed, alert and oriented  Bilateral carpal tunnel incisions c/d/i with suture. No evidence for erythema or purulence.   Sensation intact to light touch throughout median nerve distribution.    strength 5/5/   Thumb to index \"ok\" sign intact, 5/5.     A/P 65M with h/o long standing bilateral carpal tunnel syndrome s/p b/l CTR on 2/18/21. Doing well, symptoms improving.     -- Sutures were removed today, there was slight opening but no dehiscence after stitch removal   -- Dressings with gauze and cobain, okay to use any type of bandage   -- Monitor for erythema, purulence, or increase pain that would be sign of infection   -- Plastic surgery will follow peripheral, please call with questions/concerns    Nell Lee MD  [id number]  "

## 2021-03-01 NOTE — PLAN OF CARE
D: Admitted 2/8 with HF (EF 20%) exacerbation. S/p bilat surgical release of carpal tunnel 2/18. Hx of NICM c/b VT s/p CRT-D s/p HM2 LVAD 6/2017, afib, CKD, DM2, COPD/asthma, hx of Sylvester-en-Y.    I: Monitored vitals and assessed pt status.   Changed: Restarted 4 mg of lasix.       A: A0x4. VSS on RA, LVAD numbers WNL. Afebrile. Urinating adequately. C/o minimal pain in shoulders and hand.     P: Continue to monitor Pt status and report changes to cards 2.

## 2021-03-01 NOTE — PLAN OF CARE
D: Admitted with worsening renal function and functional status 2/8. Hx includes NICM, VT s/p CRT-D, HM2 LVAD (6/19/17). Listed status 2E for heart transplant. This admission had bilateral carpal tunnel release surgery 2/18.    I: Monitored vitals and assessed pt status.   Running:   -PIV saline locked.    A: A0x4. VSS, on room air/CPAP with sleep. Sinus rhythm with 1st degree AVB and occasional PVCs on monitor. HM2 LVAD without alarms and numbers within normal limits this shift. Afebrile. Denies pain. Adequate UOP.  Bilateral wrist incisions CDI. Blood sugars ACHS. Appeared to sleep well between cares. Up independently in room.     Temp:  [97.5  F (36.4  C)-98.4  F (36.9  C)] 98.1  F (36.7  C)  Pulse:  [68-73] 68  Resp:  [16-18] 18  BP: (82-97)/(61-80) 83/61  SpO2:  [96 %-100 %] 96 %      P: Continue to monitor Pt status and report changes to treatment team.

## 2021-03-01 NOTE — PROGRESS NOTES
[facility]   Cardiology II Service / Advanced Heart Failure  Daily Progress Note  Date of Service: 3/1/2021      Patient: Jim Willingham  MRN: [id number]  Admission Date: 2/8/2021  Hospital Day # 21       Assessment and Plan: Jim Willingham is a 65 year old male with history of NICM EF 20% c/b VT s/p CRT-D s/p HMII LVAD 6/19/2017 originally intended as destination therapy 2/2 obesity however with recent weight loss now candidate for transplantation. He is admitted for worsening functional status and renal function concerning for worsening heart failure. He is now listed status 2E for transplant.     Chronic systolic heart failure secondary to NICM s/p HM II LVAD. Echo 1/8/21 at 9400rpm, EF<30%m LVIDd 6.8cm, at least mildly reduced RV function, AoV closed without AI, mild-mod eccentric MR, dilated IVC without collapse. RHC 2/23 RA 7 PA 26/8(15) PCWP 6 Kee C/CO 4.6/2.2 TD CO/CI 5.5/2.6.    Stage D, NYHA Class III  ACEi/ARB contraindicated due to renal dysfunction. Hydralazine 75 mg po TID. Isordil 10 mg po TID.   BB contraindicated due to low cardiac output  Aldosterone antagonist contraindicated due to renal dysfunction  SCD prophylaxis CRT-D  Fluid status mild hypervolemia. Bumex 4 mg po BID, to resume this afternoon.   MAP: 72  LDH: 291  Anticoagulation: Coumadin per pharmacy. INR- 1.95, goal 2.5-3, goal increased in setting of power spikes and elevated flows.   Antiplatelet: ASA 81 mg po daily  - remains on the cardiac transplant list status 2E.   - log files sent for elevated power spikes and flows with no clear source. Resolved today.      The patient's HeartMate LVAD was interrogated 3/1/2021  * Speed 9600 rpm   * Pulsatility index 4.7  * Power 7.2 Manuel   * Flow 7.2 L/minute   Fluid status: Euvolemic   Alarms were reviewed, and notable for PI events. Improvement in flows and power spikes.   The driveline exit site was covered with dressing clean, dry, and intact.   All external components were  inspected and showed no evidence of damage or malfunction.     Carpel tunnel, bilateral s/p bilateral release. Evidence of thenar atrophy. Relief with steroid injection 11/20. Carpal tunnel release 2/18/21.  - Appreciate Ortho/Plastics consult.  - Scheduled Tylenol 650 mg po QID.   - Gabapentin 300 mg in AM, 600 mg in the afternoon, and 600 mg in the evening.      WALTER on CKD Stage III. Cr peaked at 2.22  - Cr-1.64 (1.88).     History of Afib. History of VT/VF. CHADSVASC-5.   - Continue Amiodarone 200 mg po daily.   - Coumadin as above.      Polyarticular Acute Gout, resolved. . Minimal improvement with Colchicine times one. Anakinra 100 mg subcutaneous daily for 5 days completed.   - Appreciate Rheumatology consult.      DM type II, controlled. Symptomatic Hypoglycemia: Hgb A1C-6.2. Lantus and Novololg sliding scale insulin discontinued.   -  If blood glucose <70, check serum glucose, C peptide, and pro-insulin. Consult Endo if further hypoglycemia.      Rhinovirus, resolved. Bacterial bronchitis. Completed 5 day course of Cefdinir. COVID negative 2/8. Resp PCR +rhinovirus. CXR 2/12 with no overt PNA. Stopped cefdinir 2/15. Cleared for transplant per ID.   - repeat RVP-3/5 per transplant ID  - RVP and COVID repeated due to sinus congestion, negative. Claritin 10 mg times one for allergies per pt request.      Chronic/resolved conditions  COPD/Asthma: pta Breo Ellipta, albuterol prn.   Depression: pta Bupropion  Right sided weakness, resolved. CT head negative for acute findings. Appreciate Neuro eval.   Staph Hominis Bacteremia. No need for surveillance blood cx per transplant ID.       FEN: 2 gram sodium diet   PROPHY:  Coumadin  LINES:  PIV   DISPO:  TBD pending cardiac transplant.   CODE STATUS: Full Code   ================================================================    Interval History/ROS: He notes mild persistent congestion, similar to his allergy symptoms. He denies fever, chills, chest pain,  "palpitations, cough, nausea, vomiting, diarrhea, melena, hematochezia, and concerns at drive line site. Improved LE edema, but persistent abdominal distention. He is tolerating oral intake and ambulation.     Last 24 hr care team notes reviewed.   ROS:  4 point ROS including Respiratory, CV, GI and , other than that noted in the HPI, is negative.     Medications: Reviewed in EPIC.     Physical Exam:   BP (!) 81/68 (BP Location: Left arm)   Pulse 72   Temp 98.1  F (36.7  C) (Oral)   Resp 16   Ht 1.727 m (5' 8\")   Wt 100.3 kg (221 lb 3.2 oz)   SpO2 96%   BMI 33.63 kg/m    GENERAL: Appears alert and oriented times three.   HEENT: Eye symmetrical and free of discharge bilaterally. Mucous membranes moist and without lesions.  NECK: Supple and without lymphadenopathy. JVD 10 cm.   CV: RRR, S1S2 present with LVAD hum.   RESPIRATORY: Respirations regular, even, and unlabored. Lungs CTA throughout.   GI: Soft and mildly distended with normoactive bowel sounds present in all quadrants. No tenderness, rebound, guarding. No organomegaly.   EXTREMITIES: Trace bilateral LE peripheral edema. 2+ bilateral pedal pulses.   NEUROLOGIC: Alert and orientated x 3. CN II-XII grossly intact. No focal deficits.   MUSCULOSKELETAL: No joint swelling or tenderness.   SKIN: No jaundice. No rashes or lesions. LVAD drive line covered.     Data:  CMP  Recent Labs   Lab 03/01/21  0457 02/28/21  0634 02/27/21  0531 02/26/21  0558 02/23/21  0614 02/23/21  0614    137 138 135   < > 135   POTASSIUM 3.8 3.9 3.8 4.0   < > 3.8   CHLORIDE 103 103 106 102   < > 102   CO2 27 28 28 28   < > 28   ANIONGAP 7 6 4 5   < > 5   GLC 92 92 106* 90   < > 101*   BUN 42* 42* 42* 40*   < > 36*   CR 1.64* 1.88* 1.88* 1.80*   < > 1.68*   GFRESTIMATED 44* 37* 37* 39*   < > 42*   GFRESTBLACK 51* 43* 43* 45*   < > 49*   QAMAR 8.3* 8.3* 8.4* 8.3*   < > 8.6   MAG 2.4* 2.2 2.2 2.3   < > 2.3   PROTTOTAL 6.1*  --   --  6.3*  --  6.4*   ALBUMIN 3.1*  --   --  3.3*  --  " 3.3*   BILITOTAL 0.4  --   --  0.6  --  0.6   ALKPHOS 95  --   --  104  --  96   AST 37  --   --  42  --  32   ALT 40  --   --  38  --  33    < > = values in this interval not displayed.     CBC  Recent Labs   Lab 03/01/21 0457 02/27/21  0531 02/25/21  0534 02/23/21  0614   WBC 5.5 5.2 5.5 5.9   RBC 3.58* 3.58* 3.92* 3.85*   HGB 10.2* 10.1* 10.6* 10.8*   HCT 33.4* 33.1* 35.6* 34.7*   MCV 93 93 91 90   MCH 28.5 28.2 27.0 28.1   MCHC 30.5* 30.5* 29.8* 31.1*   RDW 19.5* 19.3* 19.5* 19.0*    263 296 268     INR  Recent Labs   Lab 03/01/21 0457 02/28/21  0634 02/27/21  0531 02/26/21  0558   INR 1.95* 1.94* 2.49* 2.79*       Patient discussed with Dr. Bautista.      Soraya Marshall FNP  3/1/2021

## 2021-03-02 LAB
ANION GAP SERPL CALCULATED.3IONS-SCNC: 6 MMOL/L (ref 3–14)
BUN SERPL-MCNC: 41 MG/DL (ref 7–30)
CALCIUM SERPL-MCNC: 8.5 MG/DL (ref 8.5–10.1)
CHLORIDE SERPL-SCNC: 102 MMOL/L (ref 94–109)
CO2 SERPL-SCNC: 28 MMOL/L (ref 20–32)
CREAT SERPL-MCNC: 1.51 MG/DL (ref 0.66–1.25)
GFR SERPL CREATININE-BSD FRML MDRD: 48 ML/MIN/{1.73_M2}
GLUCOSE BLDC GLUCOMTR-MCNC: 118 MG/DL (ref 70–99)
GLUCOSE BLDC GLUCOMTR-MCNC: 76 MG/DL (ref 70–99)
GLUCOSE BLDC GLUCOMTR-MCNC: 93 MG/DL (ref 70–99)
GLUCOSE SERPL-MCNC: 100 MG/DL (ref 70–99)
INR PPP: 1.86 (ref 0.86–1.14)
MAGNESIUM SERPL-MCNC: 2.5 MG/DL (ref 1.6–2.3)
POTASSIUM SERPL-SCNC: 3.8 MMOL/L (ref 3.4–5.3)
SODIUM SERPL-SCNC: 136 MMOL/L (ref 133–144)

## 2021-03-02 PROCEDURE — 250N000013 HC RX MED GY IP 250 OP 250 PS 637: Performed by: NURSE PRACTITIONER

## 2021-03-02 PROCEDURE — 93750 INTERROGATION VAD IN PERSON: CPT | Performed by: PHYSICIAN ASSISTANT

## 2021-03-02 PROCEDURE — 85610 PROTHROMBIN TIME: CPT | Performed by: STUDENT IN AN ORGANIZED HEALTH CARE EDUCATION/TRAINING PROGRAM

## 2021-03-02 PROCEDURE — 36415 COLL VENOUS BLD VENIPUNCTURE: CPT | Performed by: STUDENT IN AN ORGANIZED HEALTH CARE EDUCATION/TRAINING PROGRAM

## 2021-03-02 PROCEDURE — 250N000013 HC RX MED GY IP 250 OP 250 PS 637: Performed by: STUDENT IN AN ORGANIZED HEALTH CARE EDUCATION/TRAINING PROGRAM

## 2021-03-02 PROCEDURE — 83735 ASSAY OF MAGNESIUM: CPT | Performed by: STUDENT IN AN ORGANIZED HEALTH CARE EDUCATION/TRAINING PROGRAM

## 2021-03-02 PROCEDURE — 999N001017 HC STATISTIC GLUCOSE BY METER IP

## 2021-03-02 PROCEDURE — 99232 SBSQ HOSP IP/OBS MODERATE 35: CPT | Mod: 25 | Performed by: PHYSICIAN ASSISTANT

## 2021-03-02 PROCEDURE — 214N000001 HC R&B CCU UMMC

## 2021-03-02 PROCEDURE — 250N000013 HC RX MED GY IP 250 OP 250 PS 637: Performed by: INTERNAL MEDICINE

## 2021-03-02 PROCEDURE — 80048 BASIC METABOLIC PNL TOTAL CA: CPT | Performed by: STUDENT IN AN ORGANIZED HEALTH CARE EDUCATION/TRAINING PROGRAM

## 2021-03-02 RX ORDER — WARFARIN SODIUM 4 MG/1
12 TABLET ORAL
Status: COMPLETED | OUTPATIENT
Start: 2021-03-02 | End: 2021-03-02

## 2021-03-02 RX ORDER — LORATADINE 10 MG/1
10 TABLET ORAL DAILY
Status: DISCONTINUED | OUTPATIENT
Start: 2021-03-02 | End: 2021-03-09

## 2021-03-02 RX ADMIN — BUMETANIDE 4 MG: 2 TABLET ORAL at 08:13

## 2021-03-02 RX ADMIN — BUPROPION HYDROCHLORIDE 150 MG: 150 TABLET, EXTENDED RELEASE ORAL at 08:14

## 2021-03-02 RX ADMIN — GABAPENTIN 300 MG: 300 CAPSULE ORAL at 08:13

## 2021-03-02 RX ADMIN — WARFARIN SODIUM 12 MG: 4 TABLET ORAL at 18:18

## 2021-03-02 RX ADMIN — ACETAMINOPHEN 650 MG: 325 TABLET, FILM COATED ORAL at 06:16

## 2021-03-02 RX ADMIN — UMECLIDINIUM 1 PUFF: 62.5 AEROSOL, POWDER ORAL at 08:14

## 2021-03-02 RX ADMIN — LORATADINE 10 MG: 10 TABLET ORAL at 12:01

## 2021-03-02 RX ADMIN — FLUTICASONE FUROATE AND VILANTEROL TRIFENATATE 1 PUFF: 100; 25 POWDER RESPIRATORY (INHALATION) at 08:14

## 2021-03-02 RX ADMIN — AMIODARONE HYDROCHLORIDE 200 MG: 200 TABLET ORAL at 08:13

## 2021-03-02 RX ADMIN — ISOSORBIDE DINITRATE 10 MG: 10 TABLET ORAL at 14:59

## 2021-03-02 RX ADMIN — ACETAMINOPHEN 650 MG: 325 TABLET, FILM COATED ORAL at 23:36

## 2021-03-02 RX ADMIN — ACETAMINOPHEN 650 MG: 325 TABLET, FILM COATED ORAL at 16:22

## 2021-03-02 RX ADMIN — ISOSORBIDE DINITRATE 10 MG: 10 TABLET ORAL at 20:06

## 2021-03-02 RX ADMIN — ALLOPURINOL 300 MG: 300 TABLET ORAL at 08:13

## 2021-03-02 RX ADMIN — ACETAMINOPHEN 650 MG: 325 TABLET, FILM COATED ORAL at 12:01

## 2021-03-02 RX ADMIN — ASPIRIN 81 MG CHEWABLE TABLET 81 MG: 81 TABLET CHEWABLE at 08:13

## 2021-03-02 RX ADMIN — ACETAMINOPHEN 650 MG: 325 TABLET, FILM COATED ORAL at 20:06

## 2021-03-02 RX ADMIN — Medication 10 MG: at 21:31

## 2021-03-02 RX ADMIN — DOCUSATE SODIUM 50 MG AND SENNOSIDES 8.6 MG 2 TABLET: 8.6; 5 TABLET, FILM COATED ORAL at 21:31

## 2021-03-02 RX ADMIN — ISOSORBIDE DINITRATE 10 MG: 10 TABLET ORAL at 08:13

## 2021-03-02 RX ADMIN — HYDRALAZINE HYDROCHLORIDE 75 MG: 25 TABLET, FILM COATED ORAL at 08:13

## 2021-03-02 RX ADMIN — MONTELUKAST 10 MG: 10 TABLET, FILM COATED ORAL at 21:18

## 2021-03-02 RX ADMIN — HYDRALAZINE HYDROCHLORIDE 75 MG: 25 TABLET, FILM COATED ORAL at 15:00

## 2021-03-02 RX ADMIN — POTASSIUM CHLORIDE 60 MEQ: 750 TABLET, EXTENDED RELEASE ORAL at 08:13

## 2021-03-02 RX ADMIN — HYDRALAZINE HYDROCHLORIDE 75 MG: 25 TABLET, FILM COATED ORAL at 20:06

## 2021-03-02 RX ADMIN — GABAPENTIN 600 MG: 300 CAPSULE ORAL at 21:18

## 2021-03-02 RX ADMIN — GABAPENTIN 600 MG: 300 CAPSULE ORAL at 14:58

## 2021-03-02 ASSESSMENT — ACTIVITIES OF DAILY LIVING (ADL)
ADLS_ACUITY_SCORE: 12
ADLS_ACUITY_SCORE: 10
ADLS_ACUITY_SCORE: 10
ADLS_ACUITY_SCORE: 12

## 2021-03-02 ASSESSMENT — MIFFLIN-ST. JEOR: SCORE: 1769.68

## 2021-03-02 NOTE — PROGRESS NOTES
[facility]   Cardiology II Service / Advanced Heart Failure  Daily Progress Note      Patient: Jim Willingham  MRN: [id number]  Admission Date: 2/8/2021  Hospital Day # 22       Assessment and Plan: Jim Willingham is a 65 year old male with history of NICM EF 20% c/b VT s/p CRT-D s/p HMII LVAD 6/19/2017 originally intended as destination therapy 2/2 obesity however with recent weight loss now candidate for transplantation. He is admitted for worsening functional status and renal function concerning for worsening heart failure. He is now listed status 2E for transplant.    Plan for today  - Aim for net negative 1-1.5L  - Bumex PO x1 today  - Encourage OOB     Chronic systolic heart failure secondary to NICM s/p HM II LVAD. Echo 1/8/21 at 9400rpm, EF<30%m LVIDd 6.8cm, at least mildly reduced RV function, AoV closed without AI, mild-mod eccentric MR, dilated IVC without collapse. RHC 2/23 RA 7 PA 26/8(15) PCWP 6 Kee C/CO 4.6/2.2 TD CO/CI 5.5/2.6.    Stage D, NYHA Class III  ACEi/ARB contraindicated due to renal dysfunction. Hydralazine 75 mg po TID. Isordil 10 mg po TID.   BB contraindicated due to low cardiac output  Aldosterone antagonist contraindicated due to renal dysfunction  SCD prophylaxis CRT-D  Fluid status mild hypervolemia. Bumex 4 mg po daily  MAP: Goal 65-85  LDH: 291  Anticoagulation: Coumadin per pharmacy. INR- 1.86, goal 2.5-3, goal increased in setting of power spikes and elevated flows.   Antiplatelet: ASA 81 mg po daily  - remains on the cardiac transplant list status 2E.   - log files sent for elevated power spikes and flows with no clear source. Resolved since 3/1    Carpel tunnel, bilateral s/p bilateral release. Evidence of thenar atrophy. Relief with steroid injection 11/20. Carpal tunnel release 2/18/21.  - Appreciate Ortho/Plastics consult.  - Scheduled Tylenol 650 mg po QID.   - Gabapentin 300 mg in AM, 600 mg in the afternoon, and 600 mg in the evening.      WALTER on CKD Stage  III. Cr peaked at 2.22  - Cr-1.51 (1.64).     History of Afib. History of VT/VF. CHADSVASC-5.   - Continue Amiodarone 200 mg po daily.   - Coumadin as above.      Polyarticular Acute Gout, resolved. . Minimal improvement with Colchicine times one. Anakinra 100 mg subcutaneous daily for 5 days completed.   - Appreciate Rheumatology consult.      DM type II, controlled. Symptomatic Hypoglycemia: Hgb A1C-6.2. Lantus and Novololg sliding scale insulin discontinued.   -  If blood glucose <70, check serum glucose, C peptide, and pro-insulin. Consult Endo if further hypoglycemia.      Rhinovirus, resolved. Bacterial bronchitis. Completed 5 day course of Cefdinir. COVID negative 2/8. Resp PCR +rhinovirus. CXR 2/12 with no overt PNA. Stopped cefdinir 2/15. Cleared for transplant per ID.   - repeat RVP-3/5 per transplant ID  - RVP and COVID repeated due to sinus congestion, negative.   - Claritin 10 mg daily for allergies     Chronic/resolved conditions  COPD/Asthma: pta Breo Ellipta, albuterol prn.   Depression: pta Bupropion  Right sided weakness, resolved. CT head negative for acute findings. Appreciate Neuro eval.   Staph Hominis Bacteremia. No need for surveillance blood cx per transplant ID.       FEN: 2 gram sodium diet   PROPHY:  Coumadin  LINES:  PIV   DISPO:  TBD pending cardiac transplant.   CODE STATUS: Full Code   ================================================================    Interval History/ROS: He notes mild persistent congestion, similar to his allergy symptoms. He denies fever, chills, chest pain, palpitations, cough, nausea, vomiting, diarrhea, melena, hematochezia, and concerns at drive line site. Improved LE edema. His stichest were taken out of his hands today. He is tolerating oral intake and ambulation.     Last 24 hr care team notes reviewed.   ROS:  4 point ROS including Respiratory, CV, GI and , other than that noted in the HPI, is negative.     Medications: Reviewed in EPIC.     Physical  "Exam:   BP (!) 83/70 (BP Location: Left arm)   Pulse 81   Temp 98  F (36.7  C) (Oral)   Resp 18   Ht 1.727 m (5' 8\")   Wt 100 kg (220 lb 8 oz)   SpO2 98%   BMI 33.53 kg/m    GENERAL: Appears alert and oriented times three.   HEENT: Eye symmetrical and free of discharge bilaterally. Mucous membranes moist and without lesions.  NECK: Supple and without lymphadenopathy. JVD 10 cm.   CV: RRR, S1S2 present with LVAD hum.   RESPIRATORY: Respirations regular, even, and unlabored. Lungs CTA throughout.   GI: Soft and mildly distended with normoactive bowel sounds present in all quadrants. No tenderness, rebound, guarding. No organomegaly.   EXTREMITIES: Trace bilateral LE peripheral edema. 2+ bilateral pedal pulses.   NEUROLOGIC: Alert and orientated x 3. CN II-XII grossly intact. No focal deficits.   MUSCULOSKELETAL: No joint swelling or tenderness.   SKIN: No jaundice. No rashes or lesions. LVAD drive line covered.     Data:  CMP  Recent Labs   Lab 03/02/21  0558 03/01/21 0457 02/28/21  0634 02/27/21  0531 02/26/21  0558    136 137 138 135   POTASSIUM 3.8 3.8 3.9 3.8 4.0   CHLORIDE 102 103 103 106 102   CO2 28 27 28 28 28   ANIONGAP 6 7 6 4 5   * 92 92 106* 90   BUN 41* 42* 42* 42* 40*   CR 1.51* 1.64* 1.88* 1.88* 1.80*   GFRESTIMATED 48* 44* 37* 37* 39*   GFRESTBLACK 56* 51* 43* 43* 45*   QAMAR 8.5 8.3* 8.3* 8.4* 8.3*   MAG 2.5* 2.4* 2.2 2.2 2.3   PROTTOTAL  --  6.1*  --   --  6.3*   ALBUMIN  --  3.1*  --   --  3.3*   BILITOTAL  --  0.4  --   --  0.6   ALKPHOS  --  95  --   --  104   AST  --  37  --   --  42   ALT  --  40  --   --  38     CBC  Recent Labs   Lab 03/01/21  0457 02/27/21  0531 02/25/21  0534   WBC 5.5 5.2 5.5   RBC 3.58* 3.58* 3.92*   HGB 10.2* 10.1* 10.6*   HCT 33.4* 33.1* 35.6*   MCV 93 93 91   MCH 28.5 28.2 27.0   MCHC 30.5* 30.5* 29.8*   RDW 19.5* 19.3* 19.5*    263 296     INR  Recent Labs   Lab 03/02/21  0558 03/01/21  0457 02/28/21  0634 02/27/21  0531   INR 1.86* 1.95* " 1.94* 2.49*       Patient discussed with Dr. Bautista.      Didi Butler PA-C  [facility]

## 2021-03-02 NOTE — PROGRESS NOTES
CLINICAL NUTRITION SERVICES - REASSESSMENT NOTE     Nutrition Prescription    RECOMMENDATIONS FOR MDs/PROVIDERS TO ORDER:  Consider checking the following labs with RNY GB hx: Vitamins A, B1, B12, D, and E. Zinc, copper, ionized calcium, folic acid, and iron labs.    Malnutrition Status:    Patient does not meet two of the established criteria necessary for diagnosing malnutrition    Recommendations already ordered by Registered Dietitian (RD):  Modified snack/oral supplement order    Future/Additional Recommendations:  1. Continue current diet, as ordered. Liberalize diet order if inadequate oral intake.   2. Monitor need for a fluid restriction, if warranted.  3. Monitor iron status.  4. Monitor BG control. DM 2. Hgb A1c of 6.2 on 1/7/21. BG was 133 on 2/22/21.   5. If TFs are needed (if/when post-op Tx), would rec place feeding tube (FT) via radiology due to RNY hx and initiate TFs, Nutren 1.5 (concentrated, maintenance TF formula). Initiate TFs at 15 mL/hr, advancing rate by 10 mL Q 8 hrs (or per provider discretion, pending hemodynamic stability) to goal rate of 65 mL/hr. Nutren 1.5 at goal 65 ml/hr to provide 2340 kcals (31 kcal/kg/day), 106 g PRO (1.4 g/kg/day), 1186 mL H2O, 275 g CHO and no fiber daily.          If begin tube feeds:    - Flush FT with 30 mL water Q 4 hrs for patency. Rec provider adjust free water flushes as needed, pending fluid status.   - Ensure K+/Mg++/Phos labs are ordered daily until TFs advance to goal infusion to evaluate for sx of refeeding with nutrition received. If lytes trend low, aggressively replace lytes.       - If not already ordered, order a multivitamin/mineral (certavite 15 mL/day via FT) to help ensure micronutrient needs being met with suspected hypermetabolic demands and potential interruptions to TF infusions.   - Monitor TF and possible need to adjust nutrition support regimen if necessary, pending medical course and nutrition status.       - Monitor need to check a  "metabolic cart study.     - Send a nutrition consult for \"Registered Dietitian to Order TF per Medical Nutrition Therapy Guidelines\" if desire RD to order TFs.   6. Order the following (Sylvester-en-y Gastric Bypass) if pt agreeable:    Multivitamin/minerals: adult dose 2 times daily    Iron: 45-60 mg elemental daily (18-36 mg daily if low risk) - may partly or fully be covered in multivitamin     Calcium Citrate containing vitamin D: 500 mg 3 times daily or 600 mg 2 times daily    Vitamin B12: sublingual form of at least 500 mcg daily or injection of 1000 mcg monthly     B-50 Complex daily     EVALUATION OF THE PROGRESS TOWARD GOALS   Diet: 3 g Sodium diet since 2/26. Previously on a 2 g sodium diet. Ordered to receive strawberry Boost Glucose Control at 14:00. Has a prn snack/supplement order. Ordered to receive two string cheeses at 14:00 daily.   Intake: Good diet tolerance. Flowsheets indicate (% intake) pt consuming 100% with a good appetite 2/22-2/26, % with a good appetite 2/27, % with a poor to good appetite 2/28, and 100% with a good appetite 3/1-3/2. Per discussion with pt, he has a good appetite and is eating adequately. He likes the strawberry Boost Glucose Control oral supplement and would like to try a chocolate Boost Glucose Control oral supplement. States he feels better no longer being on robaxin (was ordered prn) and pt feels he does not get hypoglycemic as a result. [employer] (meal ordering system) indicates food/beverages sent 2/27-3/1 totaled 2352 kcals and 100 g protein daily on average. This meets estimated needs of 3912-3006 kcals/day (20 - 25 kcals/kg) and  grams protein/day (1.1 - 1.4 grams of pro/kg). Pt is ordering three meals per day, on average. He has a fridge in his room and pt's wife may bring food, at times. Another factor affecting oral intake is potential upcoming surgery with unknown NPO status duration.      NEW FINDINGS   GI: Last stool on 3/1. Formed, soft, " "brown stools. Having zero to two stools daily.   Wt hx: 118.4 kg (2/13/20), 106.2 kg (8/7/20), 103.9 kg (11/13/20), 94.8 kg (1/12/21), 99.5 kg (2/8/21, admit), 100.2 kg (2/15/21), 98.2 kg (2/22), 100 kg (3/2) - Pt had intentionally been trying to lose wt for Tx. Also, fluid status changes and diuresis may affect weights.   WOC 2/25: \"Left knee wounds due to Trauma. Status: improving. Partial thickness, concern for bleeding 2/2 anticoagulation therapy.\"     MALNUTRITION  % Intake: No decreased intake noted  % Weight Loss: None noted  Subcutaneous Fat Loss: None observed, but difficult to assess with pt in the middle of eating lunch and body habitus  Muscle Loss: None observed, but difficult to assess with pt in the middle of eating lunch and body habitus  Fluid Accumulation/Edema: Does not meet criteria  Malnutrition Diagnosis: Patient does not meet two of the established criteria necessary for diagnosing malnutrition    Previous Goals   Patient to consume % of nutritionally adequate meal trays TID, or the equivalent with supplements/snacks.  Evaluation: Meeting     Previous Nutrition Diagnosis  Predicted inadequate nutrient intake (protein-energy) related to food choices and LOS.   Evaluation: Unchanged    CURRENT NUTRITION DIAGNOSIS  Predicted inadequate nutrient intake (protein-energy) related to food choices, LOS, and potential upcoming surgery and unknown NPO status duration.     INTERVENTIONS  Implementation  Medical food supplement therapy: Modified oral supplements to send an additional oral supplement at 14:00, chocolate Boost Glucose Control. Pt states he may place this in his fridge if he is not consuming this at 14:00.   Modify composition of meals/snacks: Discontinued string cheese as per discussion with pt.     Goals  Patient to consume % of nutritionally adequate meal trays TID, or the equivalent with supplements/snacks.    Monitoring/Evaluation  Progress toward goals will be monitored and " evaluated per protocol.      Nutrition will continue to follow.      Sana Costello MS, RD, LD, [facility]   6C Pgr: [phone]

## 2021-03-02 NOTE — PROGRESS NOTES
HX:65 year old male with history of NICM EF 20% c/b VT s/p CRT-D s/p HMII LVAD 6/19/2017 originally intended as destination therapy 2/2 obesity however with recent weight loss now candidate for transplantation. He is admitted for worsening functional status and renal function concerning for worsening heart failure. He is now listed status 2E for transplant.    Cardiac:SR, VAD values within patient norms, PIs occasionally under 3. Provider aware, bumex on hold.  VS:VSS MAPs 74, 67  IV:PIV-SL  Tubes:NA  Neuro:A/Ox4  Resp:denies shortness of breath  GI/:voiding, no BM this shift, states no difficulties  Endo:no sliding scale coverage needed for SS  Skin:intact other than VAD driveline, bilateral wrist incisions  Pain:C/O wrist discomfort, treated with scheduled tylenol  Social:Wife present this afternoon  Plan:Encourage increased activity, Status 2E for heart transplant. Continue current cares and notify provider with questions or concerns.

## 2021-03-02 NOTE — PLAN OF CARE
D: Patient admitted 2/8 for worsening functional status and renal function concerning for worsening HF. Now listed status 2E for heart txp. Hx. NICM, VT s/p CRT-D, s/p LVAD HM 2 6/2017 as DT (however w/recent weight loss now candidate for transplant), COPD, CKD, DM2.  I/A: A&Ox4. VSS (do not disturb orders overnight) on RA/Cpap at HS. Mild RESENDIZ. Afebrile. SR (1st degree AVB) 60s-70s. LVAD #s wnl/stable/BL for patient, no alarms. C/o wrist pain partially relieved scheduled tylenol.  at HS, deferred BG overnight. Adequate uop. Up ad francisco. PRN melatonin given at HS. Appeared to rest comfortably overnight.  P: Continue to monitor and notify Cards 2 with questions/concerns.

## 2021-03-02 NOTE — PROGRESS NOTES
The patient's HeartMate LVAD was interrogated 3/2/2021  * Speed 9600 rpm   * Pulsatility index 4.1   * Power 6.3 Manuel   * Flow 5.6 L/minute   Fluid status: euvolemic   Alarms were reviewed, and notable for frequent PI events, occasional speed drops associated with these. No power spikes..   The driveline exit site was inspected, c/d/i.   All external components were inspected and showed no evidence of damage or malfunction, none replaced.   No changes to VAD settings made

## 2021-03-02 NOTE — PLAN OF CARE
Presents for worsening dyspnea on exertion, lethargy. Decompensated heart failure. Currently here for heart txp waitlist status 2E. Pmhx:  NICM EF 20% c/b VT s/p CRT-D s/p HMII LVAD 2017 originally intended as destination therapy 2/2 obesity, recent weight loss, afib s/p DCCV on amiodarone and warfarin, staph hominis bacteremia, CKD3, COPD    Code status: Full     Team: cards 2  Changed: hand sutures removed     Neuro: ao*4  Cardiac/Tele:  SR w/ 1*AVB w/ BBB, LVAD #'s wnl  Respiratory: room air  GI/: urinating via urinal/toliet, LBM 3/1 per pt   Diet/Appetite: 3g na  Skin: left knee abrasion open to air, LVAD dressing CDI and changed, dressings on wrist applied   Endocrine: ACHS, if BS<70, check serum glucose, C peptide, and proinsulin per orders  LDAs: R PIV SL  Activity: up ad francisco  Pain: bilateral hand pain, tylenol scheduled q4h     Nuris Darling, RN  Time cared for 0700 - 1930

## 2021-03-03 ENCOUNTER — APPOINTMENT (OUTPATIENT)
Dept: OCCUPATIONAL THERAPY | Facility: CLINIC | Age: 66
DRG: 001 | End: 2021-03-03
Attending: INTERNAL MEDICINE
Payer: COMMERCIAL

## 2021-03-03 LAB
ANION GAP SERPL CALCULATED.3IONS-SCNC: 6 MMOL/L (ref 3–14)
BUN SERPL-MCNC: 43 MG/DL (ref 7–30)
CALCIUM SERPL-MCNC: 8.4 MG/DL (ref 8.5–10.1)
CHLORIDE SERPL-SCNC: 105 MMOL/L (ref 94–109)
CO2 SERPL-SCNC: 28 MMOL/L (ref 20–32)
CREAT SERPL-MCNC: 1.62 MG/DL (ref 0.66–1.25)
ERYTHROCYTE [DISTWIDTH] IN BLOOD BY AUTOMATED COUNT: 19.9 % (ref 10–15)
GFR SERPL CREATININE-BSD FRML MDRD: 44 ML/MIN/{1.73_M2}
GLUCOSE BLDC GLUCOMTR-MCNC: 110 MG/DL (ref 70–99)
GLUCOSE BLDC GLUCOMTR-MCNC: 118 MG/DL (ref 70–99)
GLUCOSE BLDC GLUCOMTR-MCNC: 135 MG/DL (ref 70–99)
GLUCOSE BLDC GLUCOMTR-MCNC: 90 MG/DL (ref 70–99)
GLUCOSE SERPL-MCNC: 102 MG/DL (ref 70–99)
HCT VFR BLD AUTO: 32 % (ref 40–53)
HGB BLD-MCNC: 9.5 G/DL (ref 13.3–17.7)
INR PPP: 2.21 (ref 0.86–1.14)
LDH SERPL L TO P-CCNC: 245 U/L (ref 85–227)
MAGNESIUM SERPL-MCNC: 2.5 MG/DL (ref 1.6–2.3)
MCH RBC QN AUTO: 27.6 PG (ref 26.5–33)
MCHC RBC AUTO-ENTMCNC: 29.7 G/DL (ref 31.5–36.5)
MCV RBC AUTO: 93 FL (ref 78–100)
PLATELET # BLD AUTO: 255 10E9/L (ref 150–450)
POTASSIUM SERPL-SCNC: 4.1 MMOL/L (ref 3.4–5.3)
RBC # BLD AUTO: 3.44 10E12/L (ref 4.4–5.9)
SODIUM SERPL-SCNC: 139 MMOL/L (ref 133–144)
WBC # BLD AUTO: 5 10E9/L (ref 4–11)

## 2021-03-03 PROCEDURE — 999N001017 HC STATISTIC GLUCOSE BY METER IP

## 2021-03-03 PROCEDURE — 97530 THERAPEUTIC ACTIVITIES: CPT | Mod: GO

## 2021-03-03 PROCEDURE — 93750 INTERROGATION VAD IN PERSON: CPT | Performed by: PHYSICIAN ASSISTANT

## 2021-03-03 PROCEDURE — 250N000013 HC RX MED GY IP 250 OP 250 PS 637: Performed by: INTERNAL MEDICINE

## 2021-03-03 PROCEDURE — 214N000001 HC R&B CCU UMMC

## 2021-03-03 PROCEDURE — 250N000013 HC RX MED GY IP 250 OP 250 PS 637: Performed by: NURSE PRACTITIONER

## 2021-03-03 PROCEDURE — 85027 COMPLETE CBC AUTOMATED: CPT | Performed by: STUDENT IN AN ORGANIZED HEALTH CARE EDUCATION/TRAINING PROGRAM

## 2021-03-03 PROCEDURE — 36415 COLL VENOUS BLD VENIPUNCTURE: CPT | Performed by: STUDENT IN AN ORGANIZED HEALTH CARE EDUCATION/TRAINING PROGRAM

## 2021-03-03 PROCEDURE — 85610 PROTHROMBIN TIME: CPT | Performed by: STUDENT IN AN ORGANIZED HEALTH CARE EDUCATION/TRAINING PROGRAM

## 2021-03-03 PROCEDURE — 80048 BASIC METABOLIC PNL TOTAL CA: CPT | Performed by: STUDENT IN AN ORGANIZED HEALTH CARE EDUCATION/TRAINING PROGRAM

## 2021-03-03 PROCEDURE — 83615 LACTATE (LD) (LDH) ENZYME: CPT | Performed by: STUDENT IN AN ORGANIZED HEALTH CARE EDUCATION/TRAINING PROGRAM

## 2021-03-03 PROCEDURE — 97110 THERAPEUTIC EXERCISES: CPT | Mod: GO

## 2021-03-03 PROCEDURE — 83735 ASSAY OF MAGNESIUM: CPT | Performed by: STUDENT IN AN ORGANIZED HEALTH CARE EDUCATION/TRAINING PROGRAM

## 2021-03-03 PROCEDURE — 250N000013 HC RX MED GY IP 250 OP 250 PS 637: Performed by: PHYSICIAN ASSISTANT

## 2021-03-03 PROCEDURE — 99232 SBSQ HOSP IP/OBS MODERATE 35: CPT | Mod: 25 | Performed by: PHYSICIAN ASSISTANT

## 2021-03-03 PROCEDURE — 250N000013 HC RX MED GY IP 250 OP 250 PS 637: Performed by: STUDENT IN AN ORGANIZED HEALTH CARE EDUCATION/TRAINING PROGRAM

## 2021-03-03 RX ORDER — WARFARIN SODIUM 10 MG/1
10 TABLET ORAL
Status: COMPLETED | OUTPATIENT
Start: 2021-03-03 | End: 2021-03-03

## 2021-03-03 RX ORDER — BUMETANIDE 2 MG/1
4 TABLET ORAL
Status: DISCONTINUED | OUTPATIENT
Start: 2021-03-03 | End: 2021-03-13

## 2021-03-03 RX ADMIN — UMECLIDINIUM 1 PUFF: 62.5 AEROSOL, POWDER ORAL at 09:30

## 2021-03-03 RX ADMIN — GABAPENTIN 300 MG: 300 CAPSULE ORAL at 08:01

## 2021-03-03 RX ADMIN — WARFARIN SODIUM 10 MG: 10 TABLET ORAL at 17:33

## 2021-03-03 RX ADMIN — ISOSORBIDE DINITRATE 10 MG: 10 TABLET ORAL at 20:53

## 2021-03-03 RX ADMIN — FLUTICASONE FUROATE AND VILANTEROL TRIFENATATE 1 PUFF: 100; 25 POWDER RESPIRATORY (INHALATION) at 08:04

## 2021-03-03 RX ADMIN — HYDRALAZINE HYDROCHLORIDE 75 MG: 25 TABLET, FILM COATED ORAL at 14:39

## 2021-03-03 RX ADMIN — LORATADINE 10 MG: 10 TABLET ORAL at 08:00

## 2021-03-03 RX ADMIN — ASPIRIN 81 MG CHEWABLE TABLET 81 MG: 81 TABLET CHEWABLE at 08:00

## 2021-03-03 RX ADMIN — POTASSIUM CHLORIDE 60 MEQ: 750 TABLET, EXTENDED RELEASE ORAL at 08:18

## 2021-03-03 RX ADMIN — ALLOPURINOL 300 MG: 300 TABLET ORAL at 08:01

## 2021-03-03 RX ADMIN — ACETAMINOPHEN 650 MG: 325 TABLET, FILM COATED ORAL at 16:06

## 2021-03-03 RX ADMIN — AMIODARONE HYDROCHLORIDE 200 MG: 200 TABLET ORAL at 08:01

## 2021-03-03 RX ADMIN — HYDRALAZINE HYDROCHLORIDE 75 MG: 25 TABLET, FILM COATED ORAL at 08:00

## 2021-03-03 RX ADMIN — BUPROPION HYDROCHLORIDE 150 MG: 150 TABLET, EXTENDED RELEASE ORAL at 08:00

## 2021-03-03 RX ADMIN — BUMETANIDE 4 MG: 2 TABLET ORAL at 09:30

## 2021-03-03 RX ADMIN — HYDRALAZINE HYDROCHLORIDE 75 MG: 25 TABLET, FILM COATED ORAL at 20:53

## 2021-03-03 RX ADMIN — DOCUSATE SODIUM 50 MG AND SENNOSIDES 8.6 MG 1 TABLET: 8.6; 5 TABLET, FILM COATED ORAL at 20:52

## 2021-03-03 RX ADMIN — GABAPENTIN 600 MG: 300 CAPSULE ORAL at 14:39

## 2021-03-03 RX ADMIN — ISOSORBIDE DINITRATE 10 MG: 10 TABLET ORAL at 14:40

## 2021-03-03 RX ADMIN — MONTELUKAST 10 MG: 10 TABLET, FILM COATED ORAL at 20:56

## 2021-03-03 RX ADMIN — Medication 10 MG: at 20:55

## 2021-03-03 RX ADMIN — ACETAMINOPHEN 650 MG: 325 TABLET, FILM COATED ORAL at 11:58

## 2021-03-03 RX ADMIN — ISOSORBIDE DINITRATE 10 MG: 10 TABLET ORAL at 08:00

## 2021-03-03 RX ADMIN — BUMETANIDE 4 MG: 2 TABLET ORAL at 16:06

## 2021-03-03 RX ADMIN — ACETAMINOPHEN 650 MG: 325 TABLET, FILM COATED ORAL at 20:53

## 2021-03-03 RX ADMIN — ACETAMINOPHEN 650 MG: 325 TABLET, FILM COATED ORAL at 08:01

## 2021-03-03 RX ADMIN — GABAPENTIN 600 MG: 300 CAPSULE ORAL at 20:57

## 2021-03-03 ASSESSMENT — ACTIVITIES OF DAILY LIVING (ADL)
ADLS_ACUITY_SCORE: 10

## 2021-03-03 ASSESSMENT — MIFFLIN-ST. JEOR: SCORE: 1781.47

## 2021-03-03 NOTE — PROGRESS NOTES
[facility]   Cardiology II Service / Advanced Heart Failure  Daily Progress Note      Patient: Jim Willingham  MRN: [id number]  Admission Date: 2/8/2021  Hospital Day # 23       Assessment and Plan: Jim Willingham is a 65 year old male with history of NICM EF 20% c/b VT s/p CRT-D s/p HMII LVAD 6/19/2017 originally intended as destination therapy 2/2 obesity however with recent weight loss now candidate for transplantation. He is admitted for worsening functional status and renal function concerning for worsening heart failure. He is now listed status 2E for transplant.    Plan for today  - Aim for net negative 1-1.5L  - Bumex 4 mg BID today PO  - Encourage OOB- doing well with this     Chronic systolic heart failure secondary to NICM s/p HM II LVAD. Echo 1/8/21 at 9400rpm, EF<30%m LVIDd 6.8cm, at least mildly reduced RV function, AoV closed without AI, mild-mod eccentric MR, dilated IVC without collapse. RHC 2/23 RA 7 PA 26/8(15) PCWP 6 Kee C/CO 4.6/2.2 TD CO/CI 5.5/2.6.    Stage D, NYHA Class III  ACEi/ARB contraindicated due to renal dysfunction. Hydralazine 75 mg po TID. Isordil 10 mg po TID.   BB contraindicated due to low cardiac output  Aldosterone antagonist contraindicated due to renal dysfunction  SCD prophylaxis CRT-D  Fluid status mild hypervolemia. Bumex 4 mg po daily  MAP: Goal 65-85  LDH: 291  Anticoagulation: Coumadin per pharmacy. INR- 2.21, goal 2.5-3, goal increased in setting of power spikes and elevated flows.   Antiplatelet: ASA 81 mg po daily  - remains on the cardiac transplant list status 2E.   - log files sent for elevated power spikes and flows with no clear source. Resolved since 3/1    Carpel tunnel, bilateral s/p bilateral release. Evidence of thenar atrophy. Relief with steroid injection 11/20. Carpal tunnel release 2/18/21.  - Appreciate Ortho/Plastics consult.  - Scheduled Tylenol 650 mg po QID.   - Gabapentin 300 mg in AM, 600 mg in the afternoon, and 600 mg in the  evening.      WALTER on CKD Stage III. Cr peaked at 2.22  - Cr-1.62     History of Afib. History of VT/VF. CHADSVASC-5.   - Continue Amiodarone 200 mg po daily.   - Coumadin as above.      Polyarticular Acute Gout, resolved. . Minimal improvement with Colchicine times one. Anakinra 100 mg subcutaneous daily for 5 days completed.   - Appreciate Rheumatology consult.      DM type II, controlled. Symptomatic Hypoglycemia: Hgb A1C-6.2. Lantus and Novololg sliding scale insulin discontinued.   -  If blood glucose <70, check serum glucose, C peptide, and pro-insulin. Consult Endo if further hypoglycemia.      Rhinovirus, resolved. Bacterial bronchitis. Completed 5 day course of Cefdinir. COVID negative 2/8. Resp PCR +rhinovirus. CXR 2/12 with no overt PNA. Stopped cefdinir 2/15. Cleared for transplant per ID.   - repeat RVP-3/5 per transplant ID  - RVP and COVID repeated due to sinus congestion, negative.   - Claritin 10 mg daily for allergies     Chronic/resolved conditions  COPD/Asthma: pta Breo Ellipta, albuterol prn.   Depression: pta Bupropion  Right sided weakness, resolved. CT head negative for acute findings. Appreciate Neuro eval.   Staph Hominis Bacteremia. No need for surveillance blood cx per transplant ID.       FEN: 2 gram sodium diet   PROPHY:  Coumadin  LINES:  PIV   DISPO:  TBD pending cardiac transplant.   CODE STATUS: Full Code   ================================================================    Interval History/ROS: He denies fever, chills, chest pain, palpitations, cough, nausea, vomiting, diarrhea, melena, hematochezia, and concerns at drive line site. More LE edema today. His stichest were taken out of his hands today. He is tolerating oral intake and ambulation. His mood his hopeful.     Last 24 hr care team notes reviewed.   ROS:  4 point ROS including Respiratory, CV, GI and , other than that noted in the HPI, is negative.     Medications: Reviewed in EPIC.     Physical Exam:   BP (!) 83/67 (BP  "Location: Left arm)   Pulse 78   Temp 98  F (36.7  C) (Oral)   Resp 20   Ht 1.727 m (5' 8\")   Wt 101.2 kg (223 lb 1.6 oz)   SpO2 97%   BMI 33.92 kg/m    GENERAL: Appears alert and oriented times three.   HEENT: Eye symmetrical and free of discharge bilaterally. Mucous membranes moist and without lesions.  NECK: Supple and without lymphadenopathy. JVD 10 cm.   CV: RRR, S1S2 present with LVAD hum.   RESPIRATORY: Respirations regular, even, and unlabored. Lungs CTA throughout.   GI: Soft and mildly distended with normoactive bowel sounds present in all quadrants. No tenderness, rebound, guarding. No organomegaly.   EXTREMITIES: 2+ bilateral LE peripheral edema. 2+ bilateral pedal pulses.   NEUROLOGIC: Alert and interacting appropriatly. No focal deficits.   MUSCULOSKELETAL: No joint swelling or tenderness.   SKIN: No jaundice. No rashes or lesions. LVAD drive line covered.     Data:  CMP  Recent Labs   Lab 03/03/21  0536 03/02/21  0558 03/01/21  0457 02/28/21  0634 02/26/21  0558 02/26/21  0558    136 136 137   < > 135   POTASSIUM 4.1 3.8 3.8 3.9   < > 4.0   CHLORIDE 105 102 103 103   < > 102   CO2 28 28 27 28   < > 28   ANIONGAP 6 6 7 6   < > 5   * 100* 92 92   < > 90   BUN 43* 41* 42* 42*   < > 40*   CR 1.62* 1.51* 1.64* 1.88*   < > 1.80*   GFRESTIMATED 44* 48* 44* 37*   < > 39*   GFRESTBLACK 51* 56* 51* 43*   < > 45*   QAMAR 8.4* 8.5 8.3* 8.3*   < > 8.3*   MAG 2.5* 2.5* 2.4* 2.2   < > 2.3   PROTTOTAL  --   --  6.1*  --   --  6.3*   ALBUMIN  --   --  3.1*  --   --  3.3*   BILITOTAL  --   --  0.4  --   --  0.6   ALKPHOS  --   --  95  --   --  104   AST  --   --  37  --   --  42   ALT  --   --  40  --   --  38    < > = values in this interval not displayed.     CBC  Recent Labs   Lab 03/03/21  0536 03/01/21  0457 02/27/21  0531 02/25/21  0534   WBC 5.0 5.5 5.2 5.5   RBC 3.44* 3.58* 3.58* 3.92*   HGB 9.5* 10.2* 10.1* 10.6*   HCT 32.0* 33.4* 33.1* 35.6*   MCV 93 93 93 91   MCH 27.6 28.5 28.2 27.0   MCHC " 29.7* 30.5* 30.5* 29.8*   RDW 19.9* 19.5* 19.3* 19.5*    272 263 296     INR  Recent Labs   Lab 03/03/21  0536 03/02/21  0558 03/01/21  0457 02/28/21  0634   INR 2.21* 1.86* 1.95* 1.94*       Patient discussed with Dr. Bautista.      NAOMI YeboahC  [facility]

## 2021-03-03 NOTE — PROGRESS NOTES
The patient's HeartMate LVAD was interrogated 3/3/2021  * Speed 9600 rpm   * Pulsatility index 4.9   * Power 6.7 Manuel   * Flow 6.3 L/minute   Fluid status: hypervolemic   Alarms were reviewed, and notable for rare PI events.   The driveline exit site was inspected, c/d/i.   All external components were inspected and showed no evidence of damage or malfunction, none replaced.   No changes to VAD settings made

## 2021-03-03 NOTE — PROGRESS NOTES
D: Pt admitted 2/8 for worsening functional status and renal function concerning for worsening heart failure. Now listed status 2E on heart transplant list. PMH includes COPD, CKD, DM2, NICM, VT s/p CRT-D s/p LVAD HM2 2017 as destination therapy. Pt now eligible for transplant due to weight loss.   ?  I/A : Monitored vitals and assessed pt status. A0x4. VSS. NSR with PVCs. HM2 numbers WDL. Afebrile. Urinating adequately. Denies SOB, dizziness, chest pain, palpitations, nausea. Bilateral wrist pain managed with scheduled tylenol. Dressing over wrist incisions changed, incision approximated, slight bit of edema around edges of incision but no redness or purulent drainage observed. Up ad francisco and tolerating ADLs and ambulating unit. BG have been WDL. Staff changed LVAD dressing without any reported concerns.  ?  P: Continue to monitor Pt status and report changes to treatment team.

## 2021-03-03 NOTE — PLAN OF CARE
Dx: Pt is admitted due to worsening functional status and renal function concerning for worsening heart failure. He is now listed status 2E for  OHT.     PMH: NICM EF 20% VT, CRT-D, HMII LVAD 6/19/2017, obesity, recent weight loss, atrial fibrillation s/p DCCV on amiodarone and warfarin, history of staph hominis bacteremia, CKD stage 3, COPD    Neuro: Alox4.   Cardiac: SR.  MAP=70-80's. VAD #'s w/o any changes.   Respiratory: LS + dim as bases. On RA.    GI: No c/o N or V. Appetite is good. +BM today.    :Good UO.  PO Bumex is held by the PCP.  Keeping a strict I and O's.   Activity: Up ad francisco. Independent.   Pain: Bilteral wirst discomfort well controlled with PRN Tylenol.   Skin: bruised skin. Scabbing on LE.   LDA's: PIV-SL. Drive line.     Plan:  Continue to monitor.

## 2021-03-03 NOTE — PLAN OF CARE
Pt admitted 2/8 for worsening functional status and renal function concerning for worsening heart failure. Now listed status 2E on heart transplant list. PMH includes COPD, CKD, DM2, NICM, VT s/p CRT-D s/p LVAD HM2 2017 as destination therapy. Pt now eligible for transplant due to weight loss.     Neuro: A&O x 4. Pleasant affect. Calls appropriately. Slept well overnight.   Cardiac: SR 70's with frequent PVC's at times. LVAD numbers WNL. No alarms. Pt denies dizziness, palpitations, or chest pain.   Respiratory: Sating well on RA. LS clear. CPAP overnight.  GI/: Good UOP. Last BM 3/2. Denies nausea.   Endocrine: ACHS BS checks  Diet/Appetite: 3G Na diet.  Skin: BL incisions on wrists from carpel tunnel surgery. Dressings CDI.  LDA: R PIV SL  Activity: Up ad francisco  Pain: Mild pain in wrists controlled by scheduled Tylenol.      Plan: Continue to monitor and alert team with any changes or concerns.

## 2021-03-03 NOTE — PLAN OF CARE
OT/CR 6C: Pt politely declining therapy upon AM attempt due to participating in video meeting. Will check back this PM as able and/or reschedule per POC.

## 2021-03-04 LAB
ANION GAP SERPL CALCULATED.3IONS-SCNC: 8 MMOL/L (ref 3–14)
BUN SERPL-MCNC: 42 MG/DL (ref 7–30)
CALCIUM SERPL-MCNC: 8.7 MG/DL (ref 8.5–10.1)
CHLORIDE SERPL-SCNC: 105 MMOL/L (ref 94–109)
CO2 SERPL-SCNC: 24 MMOL/L (ref 20–32)
CREAT SERPL-MCNC: 1.54 MG/DL (ref 0.66–1.25)
GFR SERPL CREATININE-BSD FRML MDRD: 47 ML/MIN/{1.73_M2}
GLUCOSE BLDC GLUCOMTR-MCNC: 101 MG/DL (ref 70–99)
GLUCOSE BLDC GLUCOMTR-MCNC: 130 MG/DL (ref 70–99)
GLUCOSE BLDC GLUCOMTR-MCNC: 137 MG/DL (ref 70–99)
GLUCOSE BLDC GLUCOMTR-MCNC: 56 MG/DL (ref 70–99)
GLUCOSE SERPL-MCNC: 84 MG/DL (ref 70–99)
GLUCOSE SERPL-MCNC: 89 MG/DL (ref 70–99)
INR PPP: 2.43 (ref 0.86–1.14)
MAGNESIUM SERPL-MCNC: 2.5 MG/DL (ref 1.6–2.3)
POTASSIUM SERPL-SCNC: 3.9 MMOL/L (ref 3.4–5.3)
SODIUM SERPL-SCNC: 138 MMOL/L (ref 133–144)

## 2021-03-04 PROCEDURE — 93750 INTERROGATION VAD IN PERSON: CPT | Performed by: PHYSICIAN ASSISTANT

## 2021-03-04 PROCEDURE — 82947 ASSAY GLUCOSE BLOOD QUANT: CPT | Performed by: PHYSICIAN ASSISTANT

## 2021-03-04 PROCEDURE — 84206 ASSAY OF PROINSULIN: CPT | Performed by: PHYSICIAN ASSISTANT

## 2021-03-04 PROCEDURE — 214N000001 HC R&B CCU UMMC

## 2021-03-04 PROCEDURE — 99232 SBSQ HOSP IP/OBS MODERATE 35: CPT | Mod: 25 | Performed by: PHYSICIAN ASSISTANT

## 2021-03-04 PROCEDURE — 85610 PROTHROMBIN TIME: CPT | Performed by: STUDENT IN AN ORGANIZED HEALTH CARE EDUCATION/TRAINING PROGRAM

## 2021-03-04 PROCEDURE — 999N001017 HC STATISTIC GLUCOSE BY METER IP

## 2021-03-04 PROCEDURE — 250N000013 HC RX MED GY IP 250 OP 250 PS 637: Performed by: NURSE PRACTITIONER

## 2021-03-04 PROCEDURE — 36415 COLL VENOUS BLD VENIPUNCTURE: CPT | Performed by: STUDENT IN AN ORGANIZED HEALTH CARE EDUCATION/TRAINING PROGRAM

## 2021-03-04 PROCEDURE — 80048 BASIC METABOLIC PNL TOTAL CA: CPT | Performed by: STUDENT IN AN ORGANIZED HEALTH CARE EDUCATION/TRAINING PROGRAM

## 2021-03-04 PROCEDURE — 83735 ASSAY OF MAGNESIUM: CPT | Performed by: STUDENT IN AN ORGANIZED HEALTH CARE EDUCATION/TRAINING PROGRAM

## 2021-03-04 PROCEDURE — 84681 ASSAY OF C-PEPTIDE: CPT | Performed by: PHYSICIAN ASSISTANT

## 2021-03-04 PROCEDURE — 250N000013 HC RX MED GY IP 250 OP 250 PS 637: Performed by: STUDENT IN AN ORGANIZED HEALTH CARE EDUCATION/TRAINING PROGRAM

## 2021-03-04 PROCEDURE — 36415 COLL VENOUS BLD VENIPUNCTURE: CPT | Performed by: PHYSICIAN ASSISTANT

## 2021-03-04 PROCEDURE — 250N000013 HC RX MED GY IP 250 OP 250 PS 637: Performed by: INTERNAL MEDICINE

## 2021-03-04 PROCEDURE — 250N000013 HC RX MED GY IP 250 OP 250 PS 637: Performed by: PHYSICIAN ASSISTANT

## 2021-03-04 RX ORDER — ACETAMINOPHEN 325 MG/1
650 TABLET ORAL EVERY 6 HOURS PRN
Status: DISCONTINUED | OUTPATIENT
Start: 2021-03-04 | End: 2021-03-21

## 2021-03-04 RX ORDER — WARFARIN SODIUM 4 MG/1
8 TABLET ORAL
Status: COMPLETED | OUTPATIENT
Start: 2021-03-04 | End: 2021-03-04

## 2021-03-04 RX ADMIN — GABAPENTIN 600 MG: 300 CAPSULE ORAL at 22:28

## 2021-03-04 RX ADMIN — ISOSORBIDE DINITRATE 10 MG: 10 TABLET ORAL at 19:33

## 2021-03-04 RX ADMIN — GABAPENTIN 600 MG: 300 CAPSULE ORAL at 17:22

## 2021-03-04 RX ADMIN — BUMETANIDE 4 MG: 2 TABLET ORAL at 17:22

## 2021-03-04 RX ADMIN — Medication 10 MG: at 22:28

## 2021-03-04 RX ADMIN — UMECLIDINIUM 1 PUFF: 62.5 AEROSOL, POWDER ORAL at 08:14

## 2021-03-04 RX ADMIN — LORATADINE 10 MG: 10 TABLET ORAL at 08:13

## 2021-03-04 RX ADMIN — ASPIRIN 81 MG CHEWABLE TABLET 81 MG: 81 TABLET CHEWABLE at 08:12

## 2021-03-04 RX ADMIN — GABAPENTIN 300 MG: 300 CAPSULE ORAL at 08:14

## 2021-03-04 RX ADMIN — POTASSIUM CHLORIDE 60 MEQ: 750 TABLET, EXTENDED RELEASE ORAL at 08:14

## 2021-03-04 RX ADMIN — AMIODARONE HYDROCHLORIDE 200 MG: 200 TABLET ORAL at 08:11

## 2021-03-04 RX ADMIN — ISOSORBIDE DINITRATE 10 MG: 10 TABLET ORAL at 08:12

## 2021-03-04 RX ADMIN — HYDRALAZINE HYDROCHLORIDE 75 MG: 25 TABLET, FILM COATED ORAL at 19:33

## 2021-03-04 RX ADMIN — ISOSORBIDE DINITRATE 10 MG: 10 TABLET ORAL at 14:23

## 2021-03-04 RX ADMIN — BUMETANIDE 4 MG: 2 TABLET ORAL at 08:12

## 2021-03-04 RX ADMIN — WARFARIN SODIUM 8 MG: 4 TABLET ORAL at 17:22

## 2021-03-04 RX ADMIN — HYDRALAZINE HYDROCHLORIDE 75 MG: 25 TABLET, FILM COATED ORAL at 08:13

## 2021-03-04 RX ADMIN — BUPROPION HYDROCHLORIDE 150 MG: 150 TABLET, EXTENDED RELEASE ORAL at 08:12

## 2021-03-04 RX ADMIN — MONTELUKAST 10 MG: 10 TABLET, FILM COATED ORAL at 22:28

## 2021-03-04 RX ADMIN — HYDRALAZINE HYDROCHLORIDE 75 MG: 25 TABLET, FILM COATED ORAL at 14:23

## 2021-03-04 RX ADMIN — ACETAMINOPHEN 650 MG: 325 TABLET, FILM COATED ORAL at 08:11

## 2021-03-04 RX ADMIN — FLUTICASONE FUROATE AND VILANTEROL TRIFENATATE 1 PUFF: 100; 25 POWDER RESPIRATORY (INHALATION) at 08:15

## 2021-03-04 RX ADMIN — ALLOPURINOL 300 MG: 300 TABLET ORAL at 08:13

## 2021-03-04 RX ADMIN — ACETAMINOPHEN 650 MG: 325 TABLET, FILM COATED ORAL at 22:28

## 2021-03-04 ASSESSMENT — ACTIVITIES OF DAILY LIVING (ADL)
ADLS_ACUITY_SCORE: 10
ADLS_ACUITY_SCORE: 10
ADLS_ACUITY_SCORE: 12
ADLS_ACUITY_SCORE: 12
ADLS_ACUITY_SCORE: 10
ADLS_ACUITY_SCORE: 10

## 2021-03-04 ASSESSMENT — MIFFLIN-ST. JEOR: SCORE: 1767.41

## 2021-03-04 NOTE — PROGRESS NOTES
The patient's HeartMate LVAD was interrogated 3/4/2021  * Speed 9500 rpm   * Pulsatility index 3.0   * Power 7.2 Manuel   * Flow 7.3 L/minute   Fluid status: midly hypervolemic   Alarms were reviewed, and notable for moderate amount of PI events with occasional speed drops.   The driveline exit site was inspected, /c/di.   All external components were inspected and showed no evidence of damage or malfunction, none replaced.   No changes to VAD settings made

## 2021-03-04 NOTE — PLAN OF CARE
D: pt admitted on 2/8/2021 for worsening functional status and renal function concerning for worsening heart failure. He is now listed status 2E for transplant. PMH of NICM EF 20% c/b VT s/p CRT-D s/p HMII LVAD 6/19/2017. n     I: Monitored vitals and assessed pt status.   Changed:  - glucose monitoring at bedtime  -Scheduled tylenol change to PRN   -pt was hypoglycemic @ 1400. BS 56, provider notified and 2 apple juice given. Recheck           Vitals:  Temp: 98  F (36.7  C) Temp src: Oral BP: (!) 77/67 Pulse: 72   Resp: 18 SpO2: 99 % O2 Device: None (Room air)      A:   Neuro: A&O x 4. Neurologically intact.  denies Headache, dizziness, and lightheadedness. Report numbness and tingling.  calls appropriately   Cardiac: SR, denies chest pain. HM2 LVAD. LVAD numbers WDL, free of alarms. Daily dressing and system check completed.   Respiratory: sating >92% on RA. RESENDIZ  Diet/appetite: 3g Na diet. Good appetite  Endocrine: BS check at bed time  GI/: No BM this shift, denies abdominal pain and nausea. reported abdominal tenderness. Good urine output, voids without difficulties   Activity: independent   Pain: hip pain managed with tylenol   Skin: no new deficits noted   LDAs: PIV   Labs: glucose 56, 2 apple juice given. Recheck glucose check 137      P: Continue to monitor Pt status and report changes to treatment team.

## 2021-03-04 NOTE — PROGRESS NOTES
[facility]   Cardiology II Service / Advanced Heart Failure  Daily Progress Note      Patient: Jim Willingham  MRN: [id number]  Admission Date: 2/8/2021  Hospital Day # 24       Assessment and Plan: Jim Willingham is a 65 year old male with history of NICM EF 20% c/b VT s/p CRT-D s/p HMII LVAD 6/19/2017 originally intended as destination therapy 2/2 obesity however with recent weight loss now candidate for transplantation. He is admitted for worsening functional status and renal function concerning for worsening heart failure. He is now listed status 2E for transplant.    Plan for today  - Aim for net negative 1-1.5L  - Bumex 4 mg BID today PO again  - Encourage OOB- doing well with this  - Tylenol changed to PRN per patient request  - Stopping inuslin, reducing bs checks to BID (BMP and a night time check), monitoring for hypoglycemia, if BS<70, will order serum glucose, C peptide, and proinsulin      Chronic systolic heart failure secondary to NICM s/p HM II LVAD. Echo 1/8/21 at 9400rpm, EF<30%m LVIDd 6.8cm, at least mildly reduced RV function, AoV closed without AI, mild-mod eccentric MR, dilated IVC without collapse. RHC 2/23 RA 7 PA 26/8(15) PCWP 6 Kee C/CO 4.6/2.2 TD CO/CI 5.5/2.6.    Stage D, NYHA Class III  ACEi/ARB contraindicated due to renal dysfunction. Hydralazine 75 mg po TID. Isordil 10 mg po TID.   BB contraindicated due to low cardiac output  Aldosterone antagonist contraindicated due to renal dysfunction  SCD prophylaxis CRT-D  Fluid status mild hypervolemia. Bumex 4 mg po daily  MAP: Goal 65-85  LDH: 245, stable  Anticoagulation: Coumadin per pharmacy. INR- 2.43, goal 2.5-3, goal increased in setting of power spikes and elevated flows.   Antiplatelet: ASA 81 mg po daily  - remains on the cardiac transplant list status 2E.   - log files sent for elevated power spikes and flows with no clear source. Resolved since 3/1    Carpel tunnel, bilateral s/p bilateral release. Evidence of  thenar atrophy. Relief with steroid injection 11/20. Carpal tunnel release 2/18/21.  - Appreciate Ortho/Plastics consult.  - Gabapentin 300 mg in AM, 600 mg in the afternoon, and 600 mg in the evening.   - Tyelnol PRN     WALTER on CKD Stage III. Cr peaked at 2.22  - Cr-1.54     History of Afib. History of VT/VF. CHADSVASC-5.   - Continue Amiodarone 200 mg po daily.   - Coumadin as above.      Polyarticular Acute Gout, resolved. . Minimal improvement with Colchicine times one. Anakinra 100 mg subcutaneous daily for 5 days completed.   - Appreciate Rheumatology consult.      DM type II, controlled. Symptomatic Hypoglycemia: Hgb A1C-6.2. Lantus and Novololg sliding scale insulin discontinued.   -  If blood glucose <70, check serum glucose, C peptide, and pro-insulin. Consult Endo if further hypoglycemia.      Rhinovirus, resolved. Bacterial bronchitis. Completed 5 day course of Cefdinir. COVID negative 2/8. Resp PCR +rhinovirus. CXR 2/12 with no overt PNA. Stopped cefdinir 2/15. Cleared for transplant per ID.   - repeat RVP-3/5 per transplant ID  - RVP and COVID repeated due to sinus congestion, negative.   - Claritin 10 mg daily for allergies     Chronic/resolved conditions  COPD/Asthma: pta Breo Ellipta, albuterol prn.   Depression: pta Bupropion  Right sided weakness, resolved. CT head negative for acute findings. Appreciate Neuro eval.   Staph Hominis Bacteremia. No need for surveillance blood cx per transplant ID.       FEN: 2 gram sodium diet   PROPHY:  Coumadin  LINES:  PIV   DISPO:  TBD pending cardiac transplant.   CODE STATUS: Full Code   ================================================================    Interval History/ROS: He denies fever, chills, chest pain, palpitations, cough, nausea, vomiting, diarrhea, melena, hematochezia, and concerns at drive line site. LE edema is improving today. He is tolerating oral intake and ambulation. His mood his good.    Last 24 hr care team notes reviewed.   ROS:  4 point  "ROS including Respiratory, CV, GI and , other than that noted in the HPI, is negative.     Medications: Reviewed in EPIC.     Physical Exam:   BP (!) 77/67 (BP Location: Left arm)   Pulse 72   Temp 98  F (36.7  C) (Oral)   Resp 18   Ht 1.727 m (5' 8\")   Wt 99.8 kg (220 lb)   SpO2 99%   BMI 33.45 kg/m    GENERAL: Appears alert and interacting appropriately.  HEENT: Eye symmetrical and free of discharge bilaterally. Mucous membranes moist and without lesions.  NECK: Supple and without lymphadenopathy. JVD 10 cm.   CV: RRR, S1S2 present with LVAD hum.   RESPIRATORY: Respirations regular, even, and unlabored. Lungs CTA throughout.   GI: Soft and mildly distended with normoactive bowel sounds present in all quadrants. No tenderness, rebound, guarding. No organomegaly.   EXTREMITIES: 2+ bilateral LE peripheral edema. All extremities are warm and well efused.  NEUROLOGIC: Alert and interacting appropriatly. No focal deficits.   MUSCULOSKELETAL: No joint swelling or tenderness.   SKIN: No jaundice. No rashes or lesions. LVAD drive line dressing c/d/i    Data:  CMP  Recent Labs   Lab 03/04/21  0533 03/03/21  0536 03/02/21  0558 03/01/21  0457 02/26/21  0558 02/26/21  0558    139 136 136   < > 135   POTASSIUM 3.9 4.1 3.8 3.8   < > 4.0   CHLORIDE 105 105 102 103   < > 102   CO2 24 28 28 27   < > 28   ANIONGAP 8 6 6 7   < > 5   GLC 89 102* 100* 92   < > 90   BUN 42* 43* 41* 42*   < > 40*   CR 1.54* 1.62* 1.51* 1.64*   < > 1.80*   GFRESTIMATED 47* 44* 48* 44*   < > 39*   GFRESTBLACK 55* 51* 56* 51*   < > 45*   QAMAR 8.7 8.4* 8.5 8.3*   < > 8.3*   MAG 2.5* 2.5* 2.5* 2.4*   < > 2.3   PROTTOTAL  --   --   --  6.1*  --  6.3*   ALBUMIN  --   --   --  3.1*  --  3.3*   BILITOTAL  --   --   --  0.4  --  0.6   ALKPHOS  --   --   --  95  --  104   AST  --   --   --  37  --  42   ALT  --   --   --  40  --  38    < > = values in this interval not displayed.     CBC  Recent Labs   Lab 03/03/21  0536 03/01/21  0457 02/27/21  0531 "   WBC 5.0 5.5 5.2   RBC 3.44* 3.58* 3.58*   HGB 9.5* 10.2* 10.1*   HCT 32.0* 33.4* 33.1*   MCV 93 93 93   MCH 27.6 28.5 28.2   MCHC 29.7* 30.5* 30.5*   RDW 19.9* 19.5* 19.3*    272 263     INR  Recent Labs   Lab 03/04/21  0533 03/03/21  0536 03/02/21  0558 03/01/21  0457   INR 2.43* 2.21* 1.86* 1.95*       Patient discussed with Dr. Bautista.      Didi Butler PA-C  [facility]

## 2021-03-04 NOTE — PLAN OF CARE
Temp: 98.4  F (36.9  C) Temp src: Oral BP: (!) 86/60 Pulse: 70   Resp: 18 SpO2: 100 % O2 Device: None (Room air)      A:   Neuro: A/Ox4. Calls appropriately. Pleasant and cooperative  Cardiac/Tele:  VVS. SR. LVAD heartmate 2. Numbers WDL. Afebrile. Denies chest pain   Respiratory: Room air. Tolerating well. CPAP overnight  GI/: Continent. Urinal at bedside. Urinating well..  Diet/Appetite: 3 gram Na+ diet  Skin: No new deficits noted. Dressing CDI on bilateral wrist    LDAs: R PIV-SL  Activity: Independent  Pain: Denies pain    P: Continue to monitor and notify team with changes.

## 2021-03-05 LAB
ANION GAP SERPL CALCULATED.3IONS-SCNC: 6 MMOL/L (ref 3–14)
BUN SERPL-MCNC: 43 MG/DL (ref 7–30)
C PEPTIDE SERPL-MCNC: 13.9 NG/ML (ref 0.9–6.9)
C PNEUM DNA SPEC QL NAA+PROBE: NOT DETECTED
CALCIUM SERPL-MCNC: 8.5 MG/DL (ref 8.5–10.1)
CHLORIDE SERPL-SCNC: 107 MMOL/L (ref 94–109)
CO2 SERPL-SCNC: 27 MMOL/L (ref 20–32)
CREAT SERPL-MCNC: 1.45 MG/DL (ref 0.66–1.25)
ERYTHROCYTE [DISTWIDTH] IN BLOOD BY AUTOMATED COUNT: 19.9 % (ref 10–15)
FLUAV H1 2009 PAND RNA SPEC QL NAA+PROBE: NOT DETECTED
FLUAV H1 RNA SPEC QL NAA+PROBE: NOT DETECTED
FLUAV H3 RNA SPEC QL NAA+PROBE: NOT DETECTED
FLUAV RNA SPEC QL NAA+PROBE: NOT DETECTED
FLUBV RNA SPEC QL NAA+PROBE: NOT DETECTED
GFR SERPL CREATININE-BSD FRML MDRD: 51 ML/MIN/{1.73_M2}
GLUCOSE BLDC GLUCOMTR-MCNC: 153 MG/DL (ref 70–99)
GLUCOSE SERPL-MCNC: 102 MG/DL (ref 70–99)
HADV DNA SPEC QL NAA+PROBE: NOT DETECTED
HCOV PNL SPEC NAA+PROBE: NOT DETECTED
HCT VFR BLD AUTO: 33.5 % (ref 40–53)
HGB BLD-MCNC: 10.2 G/DL (ref 13.3–17.7)
HMPV RNA SPEC QL NAA+PROBE: NOT DETECTED
HPIV1 RNA SPEC QL NAA+PROBE: NOT DETECTED
HPIV2 RNA SPEC QL NAA+PROBE: NOT DETECTED
HPIV3 RNA SPEC QL NAA+PROBE: NOT DETECTED
HPIV4 RNA SPEC QL NAA+PROBE: NOT DETECTED
INR PPP: 2.65 (ref 0.86–1.14)
M PNEUMO DNA SPEC QL NAA+PROBE: NOT DETECTED
MAGNESIUM SERPL-MCNC: 2.4 MG/DL (ref 1.6–2.3)
MCH RBC QN AUTO: 28.6 PG (ref 26.5–33)
MCHC RBC AUTO-ENTMCNC: 30.4 G/DL (ref 31.5–36.5)
MCV RBC AUTO: 94 FL (ref 78–100)
MICROBIOLOGIST REVIEW: NORMAL
PLATELET # BLD AUTO: 239 10E9/L (ref 150–450)
POTASSIUM SERPL-SCNC: 4 MMOL/L (ref 3.4–5.3)
RBC # BLD AUTO: 3.57 10E12/L (ref 4.4–5.9)
RSV RNA SPEC QL NAA+PROBE: NOT DETECTED
RSV RNA SPEC QL NAA+PROBE: NOT DETECTED
RV+EV RNA SPEC QL NAA+PROBE: NOT DETECTED
SODIUM SERPL-SCNC: 140 MMOL/L (ref 133–144)
WBC # BLD AUTO: 4.9 10E9/L (ref 4–11)

## 2021-03-05 PROCEDURE — 87581 M.PNEUMON DNA AMP PROBE: CPT | Performed by: PHYSICIAN ASSISTANT

## 2021-03-05 PROCEDURE — 250N000013 HC RX MED GY IP 250 OP 250 PS 637: Performed by: PHYSICIAN ASSISTANT

## 2021-03-05 PROCEDURE — 85610 PROTHROMBIN TIME: CPT | Performed by: STUDENT IN AN ORGANIZED HEALTH CARE EDUCATION/TRAINING PROGRAM

## 2021-03-05 PROCEDURE — 87633 RESP VIRUS 12-25 TARGETS: CPT | Performed by: PHYSICIAN ASSISTANT

## 2021-03-05 PROCEDURE — 99232 SBSQ HOSP IP/OBS MODERATE 35: CPT | Performed by: PHYSICIAN ASSISTANT

## 2021-03-05 PROCEDURE — 999N001017 HC STATISTIC GLUCOSE BY METER IP

## 2021-03-05 PROCEDURE — 250N000013 HC RX MED GY IP 250 OP 250 PS 637: Performed by: INTERNAL MEDICINE

## 2021-03-05 PROCEDURE — 87486 CHLMYD PNEUM DNA AMP PROBE: CPT | Performed by: PHYSICIAN ASSISTANT

## 2021-03-05 PROCEDURE — 36415 COLL VENOUS BLD VENIPUNCTURE: CPT | Performed by: STUDENT IN AN ORGANIZED HEALTH CARE EDUCATION/TRAINING PROGRAM

## 2021-03-05 PROCEDURE — 83735 ASSAY OF MAGNESIUM: CPT | Performed by: STUDENT IN AN ORGANIZED HEALTH CARE EDUCATION/TRAINING PROGRAM

## 2021-03-05 PROCEDURE — 80048 BASIC METABOLIC PNL TOTAL CA: CPT | Performed by: STUDENT IN AN ORGANIZED HEALTH CARE EDUCATION/TRAINING PROGRAM

## 2021-03-05 PROCEDURE — 250N000013 HC RX MED GY IP 250 OP 250 PS 637: Performed by: NURSE PRACTITIONER

## 2021-03-05 PROCEDURE — 250N000013 HC RX MED GY IP 250 OP 250 PS 637: Performed by: STUDENT IN AN ORGANIZED HEALTH CARE EDUCATION/TRAINING PROGRAM

## 2021-03-05 PROCEDURE — 214N000001 HC R&B CCU UMMC

## 2021-03-05 PROCEDURE — 85027 COMPLETE CBC AUTOMATED: CPT | Performed by: STUDENT IN AN ORGANIZED HEALTH CARE EDUCATION/TRAINING PROGRAM

## 2021-03-05 RX ORDER — WARFARIN SODIUM 4 MG/1
8 TABLET ORAL
Status: COMPLETED | OUTPATIENT
Start: 2021-03-05 | End: 2021-03-05

## 2021-03-05 RX ADMIN — UMECLIDINIUM 1 PUFF: 62.5 AEROSOL, POWDER ORAL at 09:34

## 2021-03-05 RX ADMIN — BUMETANIDE 4 MG: 2 TABLET ORAL at 08:19

## 2021-03-05 RX ADMIN — ALLOPURINOL 300 MG: 300 TABLET ORAL at 08:19

## 2021-03-05 RX ADMIN — GABAPENTIN 600 MG: 300 CAPSULE ORAL at 14:06

## 2021-03-05 RX ADMIN — POTASSIUM CHLORIDE 60 MEQ: 750 TABLET, EXTENDED RELEASE ORAL at 08:19

## 2021-03-05 RX ADMIN — HYDRALAZINE HYDROCHLORIDE 75 MG: 25 TABLET, FILM COATED ORAL at 08:19

## 2021-03-05 RX ADMIN — ISOSORBIDE DINITRATE 10 MG: 10 TABLET ORAL at 14:06

## 2021-03-05 RX ADMIN — MONTELUKAST 10 MG: 10 TABLET, FILM COATED ORAL at 19:38

## 2021-03-05 RX ADMIN — HYDRALAZINE HYDROCHLORIDE 75 MG: 25 TABLET, FILM COATED ORAL at 14:06

## 2021-03-05 RX ADMIN — HYDRALAZINE HYDROCHLORIDE 75 MG: 25 TABLET, FILM COATED ORAL at 19:38

## 2021-03-05 RX ADMIN — ISOSORBIDE DINITRATE 10 MG: 10 TABLET ORAL at 08:19

## 2021-03-05 RX ADMIN — Medication 10 MG: at 19:39

## 2021-03-05 RX ADMIN — ACETAMINOPHEN 650 MG: 325 TABLET, FILM COATED ORAL at 05:41

## 2021-03-05 RX ADMIN — ASPIRIN 81 MG CHEWABLE TABLET 81 MG: 81 TABLET CHEWABLE at 08:18

## 2021-03-05 RX ADMIN — BUMETANIDE 4 MG: 2 TABLET ORAL at 16:41

## 2021-03-05 RX ADMIN — WARFARIN SODIUM 8 MG: 4 TABLET ORAL at 17:24

## 2021-03-05 RX ADMIN — AMIODARONE HYDROCHLORIDE 200 MG: 200 TABLET ORAL at 08:19

## 2021-03-05 RX ADMIN — GABAPENTIN 600 MG: 300 CAPSULE ORAL at 21:24

## 2021-03-05 RX ADMIN — GABAPENTIN 300 MG: 300 CAPSULE ORAL at 08:19

## 2021-03-05 RX ADMIN — ISOSORBIDE DINITRATE 10 MG: 10 TABLET ORAL at 19:38

## 2021-03-05 RX ADMIN — BUPROPION HYDROCHLORIDE 150 MG: 150 TABLET, EXTENDED RELEASE ORAL at 08:19

## 2021-03-05 RX ADMIN — LORATADINE 10 MG: 10 TABLET ORAL at 08:20

## 2021-03-05 RX ADMIN — FLUTICASONE FUROATE AND VILANTEROL TRIFENATATE 1 PUFF: 100; 25 POWDER RESPIRATORY (INHALATION) at 09:34

## 2021-03-05 ASSESSMENT — ACTIVITIES OF DAILY LIVING (ADL)
ADLS_ACUITY_SCORE: 12

## 2021-03-05 ASSESSMENT — MIFFLIN-ST. JEOR: SCORE: 1770.59

## 2021-03-05 NOTE — PLAN OF CARE
Admitted for worstening HF, 2/8; now, status 2E heart transplant.  MH: NICM EF 20% c/b VT s/p CRT-D s/p HMII LVAD 6/19/2017, CKD, DM2. VSS, AL X 4, eating and voiding adequately, LVAD alarms working and in place / VAD numbers unremarkable. VAD dressing change and anchor change done. Continuing to monitor.

## 2021-03-05 NOTE — PLAN OF CARE
Admitted 2/8 for SOB and worsening heart failure. Now listed status 2E for heart transplant.     Neuro: A&Ox4. Slept well overnight.  Cardiac: SR with frequent PVCs. VSS. HM 2 numbers WNL.  Respiratory: Sating 90s on RA. Uses home CPAP.   GI/: Voiding adequate amounts. No BM this shift.  Diet/appetite: On 3 gram Na diet. Good appetite.  Activity:  Up independently.  Pain: Tylenol given x1 for hip pain with relief.  Skin: Abrasion on left knee.  LDA's: PIV saline locked.    Plan: Continue with POC. Notify primary team with changes.

## 2021-03-05 NOTE — PROGRESS NOTES
HX:65 year old male with history of NICM EF 20% c/b VT s/p CRT-D s/p HMII LVAD 6/19/2017 originally intended as destination therapy 2/2 obesity however with recent weight loss now candidate for transplantation. He is admitted for worsening functional status and renal function concerning for worsening heart failure. He is now listed status 2E for transplant.     Cardiac:SR, VAD values within patient norms  VS:VSS MAPs 94, 72  IV:PIV-SL  Tubes:NA  Neuro:A/Ox4  Resp:denies shortness of breath, RA sats 97%  GI/:voiding,   Endo:no sliding scale coverage needed for sliding scale, no hypoglycemic episodes  Skin:intact other than VAD driveline, bilateral wrist incisions  Pain:denies pain  Social:no visitors present, cell phone at bedside  Plan:Encourage increased activity, Status 2E for heart transplant. Continue current cares and notify provider with questions or concerns.

## 2021-03-05 NOTE — PROGRESS NOTES
LVAD Social Work Services Progress Note      Date of Initial Social Work Evaluation: 2/10/2021  Collaborated with: Pt     Data: Pt is an LVAD pt now listed status 2E for heart transplant. Pt had bilateral carpal tunnel surgery 2/20. Pt working with OT and also walking the hallways independently and is independent in room.   Reinforced transplant education to manage expectations.     Intervention: Supportive VIsit   Assessment: Pt coping really well with ongoing prolonged hospitalization. There are currently no mental health concerns. Pt coping by maintaining daily communication with his great granddaughter, enjoys his wife's visits during the week and reading scripture. This week he was able to participate in important meetings with his great granddaughter's medical providers via virtual visits and this helps pt to still have an active role in her care. He does say the weekends are tough because his wife is unable to visit as she is taking care of their great granddaughter.   Education provided by SW: Reinforced Transplant Education, Assessed coping and mental health needs and ongoing Social Work support   Plan:    Discharge Plans in Progress: None     Barriers to d/c plan: Pt listed for heart transplant     Follow up Plan: CLIFF to continue to follow.

## 2021-03-05 NOTE — PROGRESS NOTES
[facility]   Cardiology II Service / Advanced Heart Failure  Daily Progress Note      Patient: Jim Willingham  MRN: [id number]  Admission Date: 2/8/2021  Hospital Day # 25       Assessment and Plan: Jim Willingham is a 65 year old male with history of NICM EF 20% c/b VT s/p CRT-D s/p HMII LVAD 6/19/2017 originally intended as destination therapy 2/2 obesity however with recent weight loss now candidate for transplantation. He is admitted for worsening functional status and renal function concerning for worsening heart failure. He is now listed status 2E for transplant.    Plan for today  - Aim for net negative 1-1.5L  - Bumex 4 mg BID today PO   - Encourage OOB- doing well with this  - F/u C peptide, and proinsulin from 3/4 (drawn for symptomatic hypoglycemia outside of the setting of insulin, 54 and then 82 on serum recheck although recheck was after some food)- page endo when resulted if necessary (consult pending)     Chronic systolic heart failure secondary to NICM s/p HM II LVAD. Echo 1/8/21 at 9400rpm, EF<30%m LVIDd 6.8cm, at least mildly reduced RV function, AoV closed without AI, mild-mod eccentric MR, dilated IVC without collapse. RHC 2/23 RA 7 PA 26/8(15) PCWP 6 Kee C/CO 4.6/2.2 TD CO/CI 5.5/2.6.    Stage D, NYHA Class III  ACEi/ARB contraindicated due to renal dysfunction. Hydralazine 75 mg po TID. Isordil 10 mg po TID.   BB contraindicated due to low cardiac output  Aldosterone antagonist contraindicated due to renal dysfunction  SCD prophylaxis CRT-D  Fluid status mild hypervolemia. Bumex 4 mg po daily  MAP: Goal 65-85  LDH: 245, 3/3 stable  Anticoagulation: Coumadin per pharmacy. INR- 2.65, goal 2.5-3, goal increased in setting of power spikes and elevated flows.   Antiplatelet: ASA 81 mg po daily  - remains on the cardiac transplant list status 2E.   - log files sent for elevated power spikes to 10-12 and flows with no clear source. Resolved since 3/1    Carpel tunnel, bilateral s/p  bilateral release. Evidence of thenar atrophy. Relief with steroid injection 11/20. Carpal tunnel release 2/18/21.  - Appreciate Ortho/Plastics consult.  - Gabapentin 300 mg in AM, 600 mg in the afternoon, and 600 mg in the evening.   - Tyelnol PRN     WALTER on CKD Stage III. Cr peaked at 2.22  - Cr-1.45     History of Afib. History of VT/VF. CHADSVASC-5.   - Continue Amiodarone 200 mg po daily.   - Coumadin as above.      Polyarticular Acute Gout, resolved.  Minimal improvement with Colchicine times one. Anakinra 100 mg subcutaneous daily for 5 days completed.   - Appreciate Rheumatology consult.      DM type II, controlled. Symptomatic Hypoglycemia: Hgb A1C-6.2. Lantus and Novololg sliding scale insulin discontinued. 3/4 had blood flucose of 54 and was symptomatic, on recheck was 84 but was after some Po intake.  - F/u C peptide, and pro-insulin.  - Endo contulst pending     Rhinovirus, resolved. Bacterial bronchitis. Completed 5 day course of Cefdinir. COVID negative 2/8. Resp PCR +rhinovirus. CXR 2/12 with no overt PNA. Stopped cefdinir 2/15. Cleared for transplant per ID.   - repeat RVP-3/5 per transplant ID, pending  - RVP and COVID repeated due to sinus congestion, negative.   - Claritin 10 mg daily for allergies     Chronic/resolved conditions  COPD/Asthma: pta Breo Ellipta, albuterol prn.   Depression: pta Bupropion  Right sided weakness, resolved. CT head negative for acute findings. Appreciate Neuro eval.   Staph Hominis Bacteremia. No need for surveillance blood cx per transplant ID.       FEN: 2 gram sodium diet   PROPHY:  Coumadin  LINES:  PIV   DISPO:  TBD pending cardiac transplant.   CODE STATUS: Full Code   ================================================================    Interval History/ROS: He denies fever, chills, chest pain, palpitations, cough, nausea, vomiting, diarrhea, melena, hematochezia, and concerns at drive line site. LE edema improved. He is tolerating oral intake and ambulation. His  "mood his good.    Last 24 hr care team notes reviewed.   ROS:  4 point ROS including Respiratory, CV, GI and , other than that noted in the HPI, is negative.     Medications: Reviewed in EPIC.     Physical Exam:   BP (!) 79/64 (BP Location: Left arm)   Pulse 75   Temp 97.3  F (36.3  C) (Oral)   Resp 18   Ht 1.727 m (5' 8\")   Wt 100.1 kg (220 lb 11.2 oz)   SpO2 99%   BMI 33.56 kg/m    GENERAL: Appears alert and interacting appropriately.  HEENT: Eye symmetrical and free of discharge bilaterally. Mucous membranes moist and without lesions.  NECK: Supple and without lymphadenopathy. JVD 9 cm.   CV: RRR, S1S2 present with LVAD hum.   RESPIRATORY: Respirations regular, even, and unlabored. Lungs CTA throughout.   GI: Soft and mildly distended with normoactive bowel sounds present in all quadrants. No tenderness, rebound, guarding. No organomegaly.   EXTREMITIES: 1+ bilateral LE peripheral edema. All extremities are warm and well efused.  NEUROLOGIC: Alert and interacting appropriatly. No focal deficits.   MUSCULOSKELETAL: No joint swelling or tenderness.   SKIN: No jaundice. No rashes or lesions. LVAD drive line dressing c/d/i    Data:  CMP  Recent Labs   Lab 03/05/21  0540 03/04/21  1512 03/04/21  0533 03/03/21  0536 03/02/21  0558 03/01/21  0457     --  138 139 136 136   POTASSIUM 4.0  --  3.9 4.1 3.8 3.8   CHLORIDE 107  --  105 105 102 103   CO2 27  --  24 28 28 27   ANIONGAP 6  --  8 6 6 7   * 84 89 102* 100* 92   BUN 43*  --  42* 43* 41* 42*   CR 1.45*  --  1.54* 1.62* 1.51* 1.64*   GFRESTIMATED 51*  --  47* 44* 48* 44*   GFRESTBLACK 59*  --  55* 51* 56* 51*   QAMAR 8.5  --  8.7 8.4* 8.5 8.3*   MAG 2.4*  --  2.5* 2.5* 2.5* 2.4*   PROTTOTAL  --   --   --   --   --  6.1*   ALBUMIN  --   --   --   --   --  3.1*   BILITOTAL  --   --   --   --   --  0.4   ALKPHOS  --   --   --   --   --  95   AST  --   --   --   --   --  37   ALT  --   --   --   --   --  40     CBC  Recent Labs   Lab 03/05/21  0540 " 03/03/21  0536 03/01/21  0457 02/27/21  0531   WBC 4.9 5.0 5.5 5.2   RBC 3.57* 3.44* 3.58* 3.58*   HGB 10.2* 9.5* 10.2* 10.1*   HCT 33.5* 32.0* 33.4* 33.1*   MCV 94 93 93 93   MCH 28.6 27.6 28.5 28.2   MCHC 30.4* 29.7* 30.5* 30.5*   RDW 19.9* 19.9* 19.5* 19.3*    255 272 263     INR  Recent Labs   Lab 03/05/21  0540 03/04/21  0533 03/03/21  0536 03/02/21  0558   INR 2.65* 2.43* 2.21* 1.86*       Patient discussed with Dr. Ulises Butler, PA-C  [facility]

## 2021-03-06 LAB
ANION GAP SERPL CALCULATED.3IONS-SCNC: 5 MMOL/L (ref 3–14)
BUN SERPL-MCNC: 38 MG/DL (ref 7–30)
CALCIUM SERPL-MCNC: 8.6 MG/DL (ref 8.5–10.1)
CHLORIDE SERPL-SCNC: 105 MMOL/L (ref 94–109)
CO2 SERPL-SCNC: 28 MMOL/L (ref 20–32)
CORTIS SERPL-MCNC: 7.1 UG/DL (ref 4–22)
CREAT SERPL-MCNC: 1.37 MG/DL (ref 0.66–1.25)
GFR SERPL CREATININE-BSD FRML MDRD: 54 ML/MIN/{1.73_M2}
GLUCOSE BLDC GLUCOMTR-MCNC: 177 MG/DL (ref 70–99)
GLUCOSE SERPL-MCNC: 105 MG/DL (ref 70–99)
INR PPP: 2.96 (ref 0.86–1.14)
MAGNESIUM SERPL-MCNC: 2.5 MG/DL (ref 1.6–2.3)
POTASSIUM SERPL-SCNC: 3.8 MMOL/L (ref 3.4–5.3)
SODIUM SERPL-SCNC: 138 MMOL/L (ref 133–144)

## 2021-03-06 PROCEDURE — 85610 PROTHROMBIN TIME: CPT | Performed by: STUDENT IN AN ORGANIZED HEALTH CARE EDUCATION/TRAINING PROGRAM

## 2021-03-06 PROCEDURE — 250N000013 HC RX MED GY IP 250 OP 250 PS 637: Performed by: STUDENT IN AN ORGANIZED HEALTH CARE EDUCATION/TRAINING PROGRAM

## 2021-03-06 PROCEDURE — 82533 TOTAL CORTISOL: CPT | Performed by: STUDENT IN AN ORGANIZED HEALTH CARE EDUCATION/TRAINING PROGRAM

## 2021-03-06 PROCEDURE — 36415 COLL VENOUS BLD VENIPUNCTURE: CPT | Performed by: STUDENT IN AN ORGANIZED HEALTH CARE EDUCATION/TRAINING PROGRAM

## 2021-03-06 PROCEDURE — 80048 BASIC METABOLIC PNL TOTAL CA: CPT | Performed by: STUDENT IN AN ORGANIZED HEALTH CARE EDUCATION/TRAINING PROGRAM

## 2021-03-06 PROCEDURE — 99222 1ST HOSP IP/OBS MODERATE 55: CPT | Mod: GC

## 2021-03-06 PROCEDURE — 93750 INTERROGATION VAD IN PERSON: CPT | Performed by: NURSE PRACTITIONER

## 2021-03-06 PROCEDURE — 99232 SBSQ HOSP IP/OBS MODERATE 35: CPT | Mod: 25 | Performed by: INTERNAL MEDICINE

## 2021-03-06 PROCEDURE — 214N000001 HC R&B CCU UMMC

## 2021-03-06 PROCEDURE — 250N000013 HC RX MED GY IP 250 OP 250 PS 637: Performed by: PHYSICIAN ASSISTANT

## 2021-03-06 PROCEDURE — 250N000013 HC RX MED GY IP 250 OP 250 PS 637: Performed by: NURSE PRACTITIONER

## 2021-03-06 PROCEDURE — 999N001017 HC STATISTIC GLUCOSE BY METER IP

## 2021-03-06 PROCEDURE — 250N000013 HC RX MED GY IP 250 OP 250 PS 637: Performed by: INTERNAL MEDICINE

## 2021-03-06 PROCEDURE — 83735 ASSAY OF MAGNESIUM: CPT | Performed by: STUDENT IN AN ORGANIZED HEALTH CARE EDUCATION/TRAINING PROGRAM

## 2021-03-06 RX ORDER — WARFARIN SODIUM 3 MG/1
6 TABLET ORAL
Status: COMPLETED | OUTPATIENT
Start: 2021-03-06 | End: 2021-03-06

## 2021-03-06 RX ORDER — HYDRALAZINE HYDROCHLORIDE 50 MG/1
50 TABLET, FILM COATED ORAL 3 TIMES DAILY
Status: DISCONTINUED | OUTPATIENT
Start: 2021-03-06 | End: 2021-03-30

## 2021-03-06 RX ADMIN — MONTELUKAST 10 MG: 10 TABLET, FILM COATED ORAL at 21:10

## 2021-03-06 RX ADMIN — AMIODARONE HYDROCHLORIDE 200 MG: 200 TABLET ORAL at 10:08

## 2021-03-06 RX ADMIN — BUMETANIDE 4 MG: 2 TABLET ORAL at 15:50

## 2021-03-06 RX ADMIN — GABAPENTIN 300 MG: 300 CAPSULE ORAL at 10:07

## 2021-03-06 RX ADMIN — ISOSORBIDE DINITRATE 10 MG: 10 TABLET ORAL at 10:08

## 2021-03-06 RX ADMIN — GABAPENTIN 600 MG: 300 CAPSULE ORAL at 21:10

## 2021-03-06 RX ADMIN — POTASSIUM CHLORIDE 60 MEQ: 750 TABLET, EXTENDED RELEASE ORAL at 10:07

## 2021-03-06 RX ADMIN — ALLOPURINOL 300 MG: 300 TABLET ORAL at 10:07

## 2021-03-06 RX ADMIN — BUMETANIDE 4 MG: 2 TABLET ORAL at 10:07

## 2021-03-06 RX ADMIN — Medication 10 MG: at 21:10

## 2021-03-06 RX ADMIN — HYDRALAZINE HYDROCHLORIDE 50 MG: 50 TABLET ORAL at 21:10

## 2021-03-06 RX ADMIN — ASPIRIN 81 MG CHEWABLE TABLET 81 MG: 81 TABLET CHEWABLE at 10:08

## 2021-03-06 RX ADMIN — GABAPENTIN 600 MG: 300 CAPSULE ORAL at 15:50

## 2021-03-06 RX ADMIN — WARFARIN SODIUM 6 MG: 3 TABLET ORAL at 18:04

## 2021-03-06 RX ADMIN — HYDRALAZINE HYDROCHLORIDE 75 MG: 25 TABLET, FILM COATED ORAL at 13:35

## 2021-03-06 RX ADMIN — LORATADINE 10 MG: 10 TABLET ORAL at 10:08

## 2021-03-06 RX ADMIN — FLUTICASONE FUROATE AND VILANTEROL TRIFENATATE 1 PUFF: 100; 25 POWDER RESPIRATORY (INHALATION) at 10:09

## 2021-03-06 RX ADMIN — ISOSORBIDE DINITRATE 10 MG: 10 TABLET ORAL at 21:10

## 2021-03-06 RX ADMIN — HYDRALAZINE HYDROCHLORIDE 75 MG: 25 TABLET, FILM COATED ORAL at 10:08

## 2021-03-06 RX ADMIN — DOCUSATE SODIUM 50 MG AND SENNOSIDES 8.6 MG 1 TABLET: 8.6; 5 TABLET, FILM COATED ORAL at 21:10

## 2021-03-06 RX ADMIN — ISOSORBIDE DINITRATE 10 MG: 10 TABLET ORAL at 13:35

## 2021-03-06 RX ADMIN — BUPROPION HYDROCHLORIDE 150 MG: 150 TABLET, EXTENDED RELEASE ORAL at 10:08

## 2021-03-06 RX ADMIN — UMECLIDINIUM 1 PUFF: 62.5 AEROSOL, POWDER ORAL at 10:09

## 2021-03-06 ASSESSMENT — ACTIVITIES OF DAILY LIVING (ADL)
ADLS_ACUITY_SCORE: 12

## 2021-03-06 ASSESSMENT — MIFFLIN-ST. JEOR: SCORE: 1772.86

## 2021-03-06 NOTE — PLAN OF CARE
D: Admitted with decompensated HF-c/o RESENDIZ/fatigue. Now listed status 2E for transplant. Hx of NICM EF 20% c/b VT s/p CRT-D s/p HMII LVAD 6/19/2017 , Afib-s/p DCCV, CKD3, and COPD.      I: Monitored vitals and assessed pt status. LVAD dressing change completed.     A: A0x4. VSS on RA, PIs up to 9.8 when laying down, otherwise number WNL. Afebrile. Urinating adequately.     P: Continue to monitor Pt status and report changes to cards 2.

## 2021-03-06 NOTE — PLAN OF CARE
D:bilalatel wrist incisions dry and intact  I:cleansed with micro klelulu  A:no drainage  P:per Primary

## 2021-03-06 NOTE — PROGRESS NOTES
[facility]   Cardiology II Service / Advanced Heart Failure  Daily Progress Note  Date of Service: 3/6/2021      Patient: Jim Willingham  MRN: [id number]  Admission Date: 2/8/2021  Hospital Day # 26    Assessment and Plan: Mr. Willingham is a 65 year old male with history of NICM, EF 20%, VT s/p CRT-D s/p HMII LVAD 6/19/2017. He was admitted 2/8/21 for worsening heart and renal function. He is listed as status 2E, awaiting heart transplant.  Exception due 3/9.      Today's plan:   - Continue PO Bumex 4 mg BID  - Reassess fluid status in AM.   - Continue to encourage OOB activities, tolerating well.   - decrease hydral to 50mg TID due to hypotension today    # Chronic systolic heart failure secondary to NICM s/p LVAD: Stage D, NYHA Class III, HM 2 LVAD.   - PO Bumex 4 mg BID  - Hydralazine to 50mg TID (due to hypotension)  - Isordil 10 mg TID  - Warfarin dosed per pharmacy  - Baby ASA daily     # CKD Stage III: Peak Creatinine 2.22  - Creatinine 1.37 today.     # Carpel Tunnel syndrome: s/p release 2/18/21, pain much improved.   - Continue Gabapentin 300mg AM, 600mg afternoon, 600 mg HS  - PRN Tylenol    # Hx of Afib/VT: CHADSVASC-5.   - PO Amiodarone 200 mg/day  - Warfarin per pharmacy    # DMT2: Controlled with recent symptomatic hypoglycemia (50's).   - Continue HS BG checks + PRN if symptomatic  - C-peptide 13.9, pro insulin pending.   - No Humalog or Lantus for now.   - Endocrine following     Chronic/resolved conditions:  COPD/Asthma: pta Breo Ellipta, albuterol prn.   Rhinovirus: Completed 5 day course of Cefdinir. COVID negative 2/8. Resp PCR +rhinovirus. CXR 2/12 with no overt PNA. Stopped cefdinir 2/15. Repeat RVP per transplant ID, negative.   Depression: pta Bupropion  Right sided weakness, resolved. CT head negative for acute findings. Appreciate Neuro eval.   Staph Hominis Bacteremia. No need for surveillance blood cx per transplant ID.    Acute Gout flare: Anakinra 100 mg subcutaneous daily  "for 5 days completed, resolved. On daily Allopurinol     FEN: 2 gram sodium restricted  PROPHY:  Warfarin  LINES:  PIV  DISPO:  TBD post transplant  CODE STATUS:  FULL code.     =============================================================    Interval History/ROS:   Doing well today. Reports abdomen feels bloated today and breathing is \"slightly tighter\", compared to yesterday. He is urinating quite a bit, some dizziness with position changes. Denies acute pain, fever/chills, CP, cough, sore throat, N/V/D, constipation, issues with sleep or appetite, bleeding, or drive line concerns. Has been up walking 2-3 x daily and has also been on the stationary bike for 30 minutes and tolerated this well. He is motivated to optimize his health so that he can continue to care for his great-grand daughter.     Last 24 hr care team notes reviewed.   ROS:  4 point ROS including Respiratory, CV, GI and , other than that noted in the HPI, is negative.     Medications: Reviewed in EPIC.     Physical Exam:   BP 99/69 (BP Location: Left arm)   Pulse 66   Temp 97.8  F (36.6  C) (Oral)   Resp 18   Ht 1.727 m (5' 8\")   Wt 100.3 kg (221 lb 3.2 oz)   SpO2 96%   BMI 33.63 kg/m    GENERAL: A&O x4. Not in distress.   HEENT: Eye symmetrical and free of discharge bilaterally. Mucous membranes moist and without lesions.  NECK: Supple and without lymphadenopathy. JVD + 2 CM above clavicle.   CV: RRR, S1S2 present with LVAD hum.    RESPIRATORY: Respirations regular, even, and unlabored. Lungs CTA throughout.   GI: Moderately distended with normoactive bowel sounds present in all quadrants.   EXTREMITIES: Trace BLE edema. .    NEUROLOGIC: Alert and orientated x 4. No focal deficits.   MUSCULOSKELETAL: No joint swelling or tenderness.   SKIN: No jaundice. No suspicious rashes, lesions, bruising on visible skin    Data:  CMP  Recent Labs   Lab 03/06/21  0619 03/05/21  0540 03/04/21  1512 03/04/21  0533 03/03/21  0536 03/01/21  0457 " 03/01/21  0457    140  --  138 139   < > 136   POTASSIUM 3.8 4.0  --  3.9 4.1   < > 3.8   CHLORIDE 105 107  --  105 105   < > 103   CO2 28 27  --  24 28   < > 27   ANIONGAP 5 6  --  8 6   < > 7   * 102* 84 89 102*   < > 92   BUN 38* 43*  --  42* 43*   < > 42*   CR 1.37* 1.45*  --  1.54* 1.62*   < > 1.64*   GFRESTIMATED 54* 51*  --  47* 44*   < > 44*   GFRESTBLACK 63 59*  --  55* 51*   < > 51*   QAMAR 8.6 8.5  --  8.7 8.4*   < > 8.3*   MAG 2.5* 2.4*  --  2.5* 2.5*   < > 2.4*   PROTTOTAL  --   --   --   --   --   --  6.1*   ALBUMIN  --   --   --   --   --   --  3.1*   BILITOTAL  --   --   --   --   --   --  0.4   ALKPHOS  --   --   --   --   --   --  95   AST  --   --   --   --   --   --  37   ALT  --   --   --   --   --   --  40    < > = values in this interval not displayed.     CBC  Recent Labs   Lab 03/05/21 0540 03/03/21  0536 03/01/21  0457   WBC 4.9 5.0 5.5   RBC 3.57* 3.44* 3.58*   HGB 10.2* 9.5* 10.2*   HCT 33.5* 32.0* 33.4*   MCV 94 93 93   MCH 28.6 27.6 28.5   MCHC 30.4* 29.7* 30.5*   RDW 19.9* 19.9* 19.5*    255 272     INR  Recent Labs   Lab 03/06/21  0619 03/05/21  0540 03/04/21  0533 03/03/21  0536   INR 2.96* 2.65* 2.43* 2.21*         Belkys Johansen RN-BSN, DNP-Student      The patient was seen and evaluated with Belkys Johansen DNP student.  I agree with the above.      Shelia Means DNP, FNP-BC, CHFN  Advanced Heart Failure Nurse Practitioner  [facility]

## 2021-03-06 NOTE — PROCEDURES
The patient's HeartMate LVAD was interrogated 3/6/2021  * Speed 9600 rpm   * Pulsatility index 3.4-5.9   * Power 6.1-7.9 Manuel   * Flow 5.3-9.5 L/minute   Fluid status: slightly hypervolemic   Alarms were reviewed.  No LVAD alarms or signs of pump malfunction, but several PI events with elevated flows noted.   The driveline exit site was covered, with dressing c/d/i.   All external components were inspected and showed no evidence of damage or malfunction, none replaced.   No changes to VAD settings made.      Shelia Means DNP, FNP-BC, CHFN  Advanced Heart Failure Nurse Practitioner  [facility]

## 2021-03-06 NOTE — CONSULTS
Endocrinology Consult     Jim Willingham MRN:[id number] YOB: 1955  Date of Admission:2/8/2021   Primary care provider: Ricardo Gómez     Reason for visit: congestive heart failure   Reason for Endocrine consult: hypoglycemia    HPI:  Jim Willingham is a 65 year old male with PMHx of non-ischemic cardiomyopathy with EF 20% c/b VT s/p CRT-D, placement of LVAD on 6/19/2017, chronic kidney disease stage III, type II diabetes, atrial fibrillation, COPD, depression    The patient was admitted to the hospital on 2/8/21 due to worsening of breathing difficulty and functional status. The patient was also found to have acute kidney injury on CKD stage III. The patient had also been losing weight over the past year. He was admitted for management of worsening heart failure, he is now a candidate for heart transplant.    Endocrine history:    The patient has history of type II diabetes which was diagnosed in 1995, which was being managed with oral hypoglycemic agents and insulin therapy    in 2003 the patient had gastric bypass surgery    he did not require any medical therapy for diabetes management for 18 years after his surgery    he does report having you hypoglycemic episodes after his bypass surgery, he said that he had to keep candy in his pocket to consume if he had low glucose readings.    He restarted metformin therapy around 5 to 6 years ago. He had also been subsequently started on short-acting insulin sliding scale for high glucose readings    in September 2020 the patient was also started on Lantus 6 units daily, metformin was discontinued due to worsening of his renal function. He was also on insulin sliding scale for correction of high glucose readings    he reports having good glycemic control with this regimen, denies any frequent hypoglycemic episodes    the patient says that he had been on chronic prednisone therapy for management of pain from his carpal tunnel syndrome. He said that he  could never make time to get proper management for his carpal tunnel syndrome as he had to take care of his granddaughter who was dependent on him for her care. At times he was on prednisone as high as 40 mg daily. His usual dose was prednisone 20 mg daily.  His prednisone was Tapered down to 5 mg daily in September 2020 and was subsequently stopped in the beginning of February 2021    the patient denies any weight loss, abdominal pain, nausea, vomiting, loss of appetite, dizziness since he stopped his prednisone therapy    Since he has been in the hospital, the patient received Lantus 6 units daily only for the first three days of his hospitalization and was subsequently discontinued, last dose was on 2/10  his glycemic control has been good without insulin most of his glucose readings are around 100. He had few readings in the 70s and one in the 60s by fingerstick testing.    On 3/4 he had a reading of 56 at 1404 and complained of abdominal pain, sweating and having a headache. He was subsequently given apple juice and abhishek crackers to consume, repeat blood glucose after 15 minutes was 137  he did not have lunch prior to this episode and had lunch at 3 PM subsequently. His breakfast is usually around 7--8 AM    He had a plasma glucose drawn one hour after this episode which was 84  he has not had any further hypoglycemic episodes since then    his renal function has also improved to his baseline level now.    The patient reports having a good appetite now, he actually eats more than he does at home. He has been ambulating around the unit without much difficulty as well.    ROS:  Appetite too good  Weight 267 last year, now 215-220  All 12 systems were reviewed and negative except as mentioned in HPI    Past Medical/Surgical History:  Past Medical History:   Diagnosis Date     Benign essential hypertension 5/11/2017     Bilateral carpal tunnel syndrome 11/2/2020     Cardiomyopathy, unspecified (H) 5/8/2017      CKD (chronic kidney disease) stage 3, GFR 30-59 ml/min 5/11/2017     COPD (chronic obstructive pulmonary disease) (H) 11/2/2020     Depression 5/11/2017     Diabetes mellitus (H) 5/11/2017     Gouty arthropathy, chronic, without tophi 11/2/2020     H/O gastric bypass 5/11/2017     ICD (implantable cardioverter-defibrillator), biventricular, in situ 5/11/2017     LVAD (left ventricular assist device) present (H)      Major depression, recurrent, chronic (H) 11/2/2020     NICM (nonischemic cardiomyopathy) (H)/ EF 20% 5/11/2017    ECHO: LVEDd. 7.66 cm, Restrictive pattern , Severe mitral valve regurgitation     CECILIA (obstructive sleep apnea) 5/11/2017     Paroxysmal atrial fibrillation (H) 5/11/2017     Paroxysmal VT (H) 5/11/2017     Rotator cuff tear arthropathy of both shoulders 11/2/2020     Uncomplicated asthma      Vitamin B12 deficiency (non anemic) 5/11/2017     Past Surgical History:   Procedure Laterality Date     ANESTHESIA CARDIOVERSION N/A 5/11/2020    Procedure: ANESTHESIA, FOR CARDIOVERSION @1100;  Surgeon: GENERIC ANESTHESIA PROVIDER;  Location: [facility] OR     CV RIGHT HEART CATH MEASUREMENTS RECORDED N/A 7/24/2019    Procedure: CV RIGHT HEART CATH;  Surgeon: Renu Sears MD;  Location:  HEART CARDIAC CATH LAB     CV RIGHT HEART CATH MEASUREMENTS RECORDED N/A 8/5/2020    Procedure: CV RIGHT HEART CATH;  Surgeon: Nicola Seth MD;  [facility]     CV RIGHT HEART CATH MEASUREMENTS RECORDED N/A 1/7/2021    Procedure: CV RIGHT HEART CATH;  Surgeon: Jac Dover MD;  Location:  HEART CARDIAC CATH LAB     CV RIGHT HEART CATH MEASUREMENTS RECORDED N/A 2/23/2021    Procedure: Heart Cath Right Heart Cath;  Surgeon: Jeffrey Gibson MD;  Location:  HEART CARDIAC CATH LAB     GI SURGERY  2003    [facility]     INSERT VENTRICULAR ASSIST DEVICE LEFT (HEARTMATE II) N/A 6/19/2017    Procedure: INSERT VENTRICULAR ASSIST DEVICE LEFT (HEARTMATE II);  Median Sternotomy  Heartmate II Left Ventricular Assist Device Insertion on Pump Oxygenator;  Surgeon: Ronnie Quigley MD;  Location: [facility] OR     ORTHOPEDIC SURGERY  1994    right knee wired     PICC DOUBLE LUMEN PLACEMENT Right 09/23/2020    5FR PICC DL. Length-43cm (1cm out).     RELEASE CARPAL TUNNEL BILATERAL Bilateral 2/18/2021    Procedure: Bilateral carpal tunnel release;  Surgeon: Jermaine Brand MD;  Location: [facility] OR       Allergies:  Allergies   Allergen Reactions     Beer Shortness Of Breath     Grass Shortness Of Breath     Ace Inhibitors Cough     Dust Mites Other (See Comments)     Asthma     Mold Other (See Comments)     Asthma     Penicillins Other (See Comments)     Unknown - childhood exposure    Tolerated Zosyn 9/18-9/20/2020     Sulfa Drugs Other (See Comments) and Unknown     Unknown childhood reaction       PTA Meds:  Prior to Admission medications    Medication Sig Last Dose Taking? Auth Provider   acetaminophen (TYLENOL 8 HOUR ARTHRITIS PAIN) 650 MG CR tablet Take 650 mg by mouth every 6 hours as needed for pain Past Week at PRN Yes Unknown, Entered By History   albuterol (PROAIR HFA) 108 (90 Base) MCG/ACT inhaler Inhale 2 puffs into the lungs every 4 hours as needed for shortness of breath / dyspnea or wheezing  Past Week at PRN Yes Reported, Patient   allopurinol (ZYLOPRIM) 300 MG tablet Take 1 tablet (300 mg) by mouth daily 2/8/2021 at AM Yes Elbert Pettit MD   amiodarone (PACERONE) 400 MG tablet Take 400 mg by mouth daily  2/8/2021 at AM Yes Unknown, Entered By History   aspirin 81 MG chewable tablet 1 tablet (81 mg) by Oral or Feeding Tube route daily 2/8/2021 at AM Yes Madina Laguna PA   bumetanide (BUMEX) 2 MG tablet Take 2 tablets (4 mg) by mouth 2 times daily 2/8/2021 at AM Yes Delisa Montgomery MD   buPROPion (WELLBUTRIN XL) 150 MG 24 hr tablet Take 1 tablet (150 mg) by mouth every morning 2/8/2021 at AM Yes Ricardo Gómez MD   cefdinir (OMNICEF) 300 MG capsule Take 1 capsule (300 mg)  by mouth 2 times daily 2/8/2021 at AM Yes Delisa Montgomery MD   Fluticasone-Umeclidin-Vilanterol (TRELEGY ELLIPTA) 100-62.5-25 MCG/INH oral inhaler Inhale 1 puff into the lungs daily 2/8/2021 at AM Yes Ricardo Gómez MD   gabapentin (NEURONTIN) 300 MG capsule Take 300 mg by mouth 3 times daily  2/8/2021 at AM Yes Unknown, Entered By History   hydrALAZINE (APRESOLINE) 25 MG tablet Take 3 tablets (75 mg) by mouth 3 times daily 2/8/2021 at AM Yes Xiomara Carrera MD   insulin glargine (LANTUS SOLOSTAR) 100 UNIT/ML pen Inject 6 Units Subcutaneous At Bedtime 2/7/2021 at HS Yes Jacque Mims MD   insulin lispro (HUMALOG KWIKPEN) 100 UNIT/ML (1 unit dial) KWIKPEN Inject 1-7 Units Subcutaneous 4 times daily Past Week at Unknown time Yes Soraya Marshall APRN CNP   isosorbide dinitrate (ISORDIL) 10 MG tablet Take 1 tablet (10 mg) by mouth 3 times daily 2/8/2021 at Unknown time Yes Xiomara Carrera MD   montelukast (SINGULAIR) 10 MG tablet Take 10 mg by mouth At Bedtime  2/7/2021 at HS Yes Reported, Patient   potassium chloride ER (KLOR-CON M) 20 MEQ CR tablet Take 3 tablets (60 mEq) by mouth daily 2/8/2021 at AM Yes Xiomara Carrera MD   warfarin ANTICOAGULANT (COUMADIN) 5 MG tablet Take 7.5 mg (5 mg x 1.5) by mouth every Monday, Wednesday, Friday and 10 mg (5 mg x 2) all other days of the week, or as directed by INR clinic. 2/7/2021 at PM Yes Unknown, Entered By History   zinc sulfate (ZINCATE) 220 (50 Zn) MG capsule Take 220 mg by mouth 2 times daily 2/8/2021 at AM Yes Unknown, Entered By History   ACE/ARB/ARNI NOT PRESCRIBED (INTENTIONAL) ACE & ARB not prescribed due to  Not a med at Rose Morales MD   blood glucose monitoring (ONE TOUCH ULTRA 2) meter device kit Use to test blood sugar four times daily or as directed. Not a med at RIGOBERTO Marshall, TIM Yun CNP   blood glucose VI test strip Use to test blood sugar four times daily or as directed. Not a med at RIGOBERTO Marshall,  TIM Yun CNP   cyanocobalamin (VITAMIN B12) 1000 MCG/ML injection Inject 1,000 mcg into the muscle every 30 days More than a month at Unknown time  Reported, Patient   insulin pen needle (32G X 4 MM) 32G X 4 MM miscellaneous Use four pen needles daily or as directed. Not a med at Soraya Amaya APRN CNP   metolazone (ZAROXOLYN) 2.5 MG tablet Take 1 tablet (2.5 mg) by mouth as needed (take ONLY as directed by VAD team) More than a month at Delisa Santacurz MD        Current Medications:   Current Facility-Administered Medications   Medication     acetaminophen (TYLENOL) tablet 650 mg     albuterol (PROAIR HFA/PROVENTIL HFA/VENTOLIN HFA) 108 (90 Base) MCG/ACT inhaler 2 puff     allopurinol (ZYLOPRIM) tablet 300 mg     alum & mag hydroxide-simethicone (MAALOX) suspension 30 mL     amiodarone (PACERONE) tablet 200 mg     aspirin (ASA) chewable tablet 81 mg     bumetanide (BUMEX) tablet 4 mg     buPROPion (WELLBUTRIN XL) 24 hr tablet 150 mg     glucose gel 15-30 g    Or     dextrose 50 % injection 25-50 mL    Or     glucagon injection 1 mg     fluticasone-vilanterol (BREO ELLIPTA) 100-25 MCG/INH inhaler 1 puff     gabapentin (NEURONTIN) capsule 300 mg     gabapentin (NEURONTIN) capsule 600 mg     guaiFENesin (MUCINEX) 12 hr tablet 600 mg     hydrALAZINE (APRESOLINE) tablet 75 mg     isosorbide dinitrate (ISORDIL) tablet 10 mg     loratadine (CLARITIN) tablet 10 mg     medication instruction     melatonin tablet 10 mg     montelukast (SINGULAIR) tablet 10 mg     naloxone (NARCAN) injection 0.2 mg    Or     naloxone (NARCAN) injection 0.4 mg    Or     naloxone (NARCAN) injection 0.2 mg    Or     naloxone (NARCAN) injection 0.4 mg     nitroGLYcerin (NITROSTAT) sublingual tablet 0.4 mg     Patient is already receiving anticoagulation with heparin, enoxaparin (LOVENOX), warfarin (COUMADIN)  or other anticoagulant medication     polyethylene glycol (MIRALAX) Packet 17 g     polyethylene glycol  "(MIRALAX) Packet 17 g     potassium chloride ER (KLOR-CON M) CR tablet 60 mEq     Reason ACE/ARB/ARNI order not selected     senna-docusate (SENOKOT-S/PERICOLACE) 8.6-50 MG per tablet 1 tablet     senna-docusate (SENOKOT-S/PERICOLACE) 8.6-50 MG per tablet 2 tablet     sodium chloride (PF) 0.9% PF flush 3 mL     sodium chloride (PF) 0.9% PF flush 3 mL     umeclidinium (INCRUSE ELLIPTA) 62.5 MCG/INH inhaler 1 puff     Warfarin Therapy Reminder (Check START DATE - warfarin may be starting in the FUTURE)       Family History:  Family History   Problem Relation Age of Onset     Cerebrovascular Disease Mother 64     Diabetes Mother      Hypertension Mother      Coronary Artery Disease Father      Diabetes Type 2  Father      Obesity Brother      Obesity Brother      Cerebrovascular Disease Daughter 40       Social History:  Social History     Tobacco Use     Smoking status: Former Smoker     Quit date:      Years since quittin.1     Smokeless tobacco: Never Used   Substance Use Topics     Alcohol use: No         Physical Examination (video visit):  BP (!) 76/56 (BP Location: Left arm)   Pulse 52   Temp 98  F (36.7  C) (Oral)   Resp 18   Ht 1.727 m (5' 8\")   Wt 100.3 kg (221 lb 3.2 oz)   SpO2 90%   BMI 33.63 kg/m      General:  Healthy, alert and oriented X3, NAD, answering questions appropriately.  SKIN: Visible skin clear.   EYES: Eyes grossly normal to inspection.    RESP: No audible wheeze, cough, or visible cyanosis.  No visible retractions or increased work of breathing.    Neurological: Alert. Mentation intact and speech normal.  Psychological: mentation appears normal, affect normal/bright, judgement and insight intact, normal speech    Endocrine Labs:  hemoglobin A1c: 6.2% on 21 TSH 3.52  3/4/21:  POC glucose 56 @1404 (given apple juice, abhishek crackers)  POC glucose 137 @1420    Plasma glucose 84 @1512  C-peptide: 13.9  Pro insulin: pending           Assessment and Plan:   Jim LOZA" Maulik is a 65 year old male with PMHx of non-ischemic cardiomyopathy with EF 20% c/b VT s/p CRT-D, placement of LVAD on 6/19/2017, chronic kidney disease stage III, type II diabetes, atrial fibrillation, COPD, depression, gastric bypass in 2003. Admitted with worsening of functional status, WALTER. on CKD, weight loss with concern for worsening heart failure, he is now a transplant candidate    1. Concern for hypoglycemia, in a patient with history of type II diabetes:  Last HbA1c was 6.2% on 1/7/21  Has not received insulin glargine since 2/10  Last dose of insulin aspart was on 3/01  C-peptide levels were checked for assessment of hypoglycemia, 13.9 (the patient was normoglycemic with post prandial plasma glucose of 84 at that time)    We will obtain AM cortisol (added-on to morning labs from today), to rule out adrenal insufficiency given his history of chronic steroid use which was recently discontinued    Patient has good oral intake, has been encouraged to continue the same    Fingerstick glucose readings might not be accurate for assessment of hypoglycemia, if the patient has another glucose reading <55 (without insulin therapy), plasma glucose should be sent for confirmation of hypoglycemia prior to correction    While the patient is off insulin therapy, POC glucose readings above 60 are acceptable for the patient  2.  DM 2 - as above  3.  History of corticosteroid- rule out adrenal insufficiency given his history of chronic steroid use which was recently discontinued. Cortisol level < 10 will require further testing  4.  History of bariatric surgery- Concern for postprandial hypoglycemia with history of gastric bypass is low, given that this patient's suspected hypoglycemic episode occurred 5-6 hours after the last meal    Due to the COVID 19 pandemic this visit was a video visit in order to help prevent spread of infection in this high risk patient and the general population. The patient gave verbal consent  for the visit today.    Platform used: [email or web address] via iPad  Start time: 0815  Stop time: 0835  Total time: 20 minutes    Case discussed with MD Juana Robledo MD  Endocrinology Fellow  Pager number [phone]    Due to the COVID 19 pandemic this visit was a video visit in order to help prevent spread of infection in this high risk patient and the general population.    I have independently reviewed and interpreted labs, imaging as indicated.     Chart review/prep time 1  [id number]  Chart review/prep time 2  Visit Start time 0922  Visit Stop time -0931  _21_ minutes spent on the date of the encounter doing chart review, history and exam, documentation and further activities as noted above.    Attending tie-in statement: Patient seen and examined by me, discussed with  fellow whose note I have reviewed and amended.    Nusrat Mcfarland MD

## 2021-03-07 LAB
ANION GAP SERPL CALCULATED.3IONS-SCNC: 6 MMOL/L (ref 3–14)
BUN SERPL-MCNC: 31 MG/DL (ref 7–30)
CALCIUM SERPL-MCNC: 8.8 MG/DL (ref 8.5–10.1)
CHLORIDE SERPL-SCNC: 108 MMOL/L (ref 94–109)
CO2 SERPL-SCNC: 27 MMOL/L (ref 20–32)
CORTIS SERPL-MCNC: 7.3 UG/DL (ref 4–22)
CREAT SERPL-MCNC: 1.43 MG/DL (ref 0.66–1.25)
ERYTHROCYTE [DISTWIDTH] IN BLOOD BY AUTOMATED COUNT: 19.3 % (ref 10–15)
GFR SERPL CREATININE-BSD FRML MDRD: 51 ML/MIN/{1.73_M2}
GLUCOSE BLDC GLUCOMTR-MCNC: 108 MG/DL (ref 70–99)
GLUCOSE SERPL-MCNC: 87 MG/DL (ref 70–99)
HCT VFR BLD AUTO: 35 % (ref 40–53)
HGB BLD-MCNC: 10.7 G/DL (ref 13.3–17.7)
INR PPP: 2.55 (ref 0.86–1.14)
LABORATORY COMMENT REPORT: NORMAL
LDH SERPL L TO P-CCNC: 248 U/L (ref 85–227)
MAGNESIUM SERPL-MCNC: 2.3 MG/DL (ref 1.6–2.3)
MCH RBC QN AUTO: 28.9 PG (ref 26.5–33)
MCHC RBC AUTO-ENTMCNC: 30.6 G/DL (ref 31.5–36.5)
MCV RBC AUTO: 95 FL (ref 78–100)
PLATELET # BLD AUTO: 215 10E9/L (ref 150–450)
POTASSIUM SERPL-SCNC: 3.7 MMOL/L (ref 3.4–5.3)
PROINSULIN P 12H FAST SERPL-SCNC: 18.4 PMOL/L
RBC # BLD AUTO: 3.7 10E12/L (ref 4.4–5.9)
SARS-COV-2 RNA RESP QL NAA+PROBE: NEGATIVE
SODIUM SERPL-SCNC: 141 MMOL/L (ref 133–144)
SPECIMEN SOURCE: NORMAL
WBC # BLD AUTO: 4.7 10E9/L (ref 4–11)

## 2021-03-07 PROCEDURE — 99232 SBSQ HOSP IP/OBS MODERATE 35: CPT | Mod: 25 | Performed by: NURSE PRACTITIONER

## 2021-03-07 PROCEDURE — 250N000013 HC RX MED GY IP 250 OP 250 PS 637: Performed by: PHYSICIAN ASSISTANT

## 2021-03-07 PROCEDURE — 99231 SBSQ HOSP IP/OBS SF/LOW 25: CPT | Mod: GC

## 2021-03-07 PROCEDURE — 36415 COLL VENOUS BLD VENIPUNCTURE: CPT | Performed by: STUDENT IN AN ORGANIZED HEALTH CARE EDUCATION/TRAINING PROGRAM

## 2021-03-07 PROCEDURE — 83615 LACTATE (LD) (LDH) ENZYME: CPT | Performed by: STUDENT IN AN ORGANIZED HEALTH CARE EDUCATION/TRAINING PROGRAM

## 2021-03-07 PROCEDURE — 85027 COMPLETE CBC AUTOMATED: CPT | Performed by: STUDENT IN AN ORGANIZED HEALTH CARE EDUCATION/TRAINING PROGRAM

## 2021-03-07 PROCEDURE — 250N000013 HC RX MED GY IP 250 OP 250 PS 637: Performed by: STUDENT IN AN ORGANIZED HEALTH CARE EDUCATION/TRAINING PROGRAM

## 2021-03-07 PROCEDURE — 93750 INTERROGATION VAD IN PERSON: CPT | Performed by: NURSE PRACTITIONER

## 2021-03-07 PROCEDURE — 82533 TOTAL CORTISOL: CPT | Performed by: STUDENT IN AN ORGANIZED HEALTH CARE EDUCATION/TRAINING PROGRAM

## 2021-03-07 PROCEDURE — 80048 BASIC METABOLIC PNL TOTAL CA: CPT | Performed by: STUDENT IN AN ORGANIZED HEALTH CARE EDUCATION/TRAINING PROGRAM

## 2021-03-07 PROCEDURE — 999N001017 HC STATISTIC GLUCOSE BY METER IP

## 2021-03-07 PROCEDURE — 250N000013 HC RX MED GY IP 250 OP 250 PS 637: Performed by: INTERNAL MEDICINE

## 2021-03-07 PROCEDURE — 214N000001 HC R&B CCU UMMC

## 2021-03-07 PROCEDURE — U0005 INFEC AGEN DETEC AMPLI PROBE: HCPCS | Performed by: NURSE PRACTITIONER

## 2021-03-07 PROCEDURE — U0003 INFECTIOUS AGENT DETECTION BY NUCLEIC ACID (DNA OR RNA); SEVERE ACUTE RESPIRATORY SYNDROME CORONAVIRUS 2 (SARS-COV-2) (CORONAVIRUS DISEASE [COVID-19]), AMPLIFIED PROBE TECHNIQUE, MAKING USE OF HIGH THROUGHPUT TECHNOLOGIES AS DESCRIBED BY CMS-2020-01-R: HCPCS | Performed by: NURSE PRACTITIONER

## 2021-03-07 PROCEDURE — 250N000013 HC RX MED GY IP 250 OP 250 PS 637: Performed by: NURSE PRACTITIONER

## 2021-03-07 PROCEDURE — 85610 PROTHROMBIN TIME: CPT | Performed by: STUDENT IN AN ORGANIZED HEALTH CARE EDUCATION/TRAINING PROGRAM

## 2021-03-07 PROCEDURE — 83735 ASSAY OF MAGNESIUM: CPT | Performed by: STUDENT IN AN ORGANIZED HEALTH CARE EDUCATION/TRAINING PROGRAM

## 2021-03-07 RX ORDER — WARFARIN SODIUM 7.5 MG/1
7.5 TABLET ORAL
Status: COMPLETED | OUTPATIENT
Start: 2021-03-07 | End: 2021-03-07

## 2021-03-07 RX ORDER — COSYNTROPIN 0.25 MG/ML
250 INJECTION, POWDER, FOR SOLUTION INTRAMUSCULAR; INTRAVENOUS ONCE
Status: COMPLETED | OUTPATIENT
Start: 2021-03-08 | End: 2021-03-08

## 2021-03-07 RX ADMIN — ASPIRIN 81 MG CHEWABLE TABLET 81 MG: 81 TABLET CHEWABLE at 08:16

## 2021-03-07 RX ADMIN — ISOSORBIDE DINITRATE 10 MG: 10 TABLET ORAL at 08:15

## 2021-03-07 RX ADMIN — GABAPENTIN 600 MG: 300 CAPSULE ORAL at 22:00

## 2021-03-07 RX ADMIN — WARFARIN SODIUM 7.5 MG: 7.5 TABLET ORAL at 18:35

## 2021-03-07 RX ADMIN — BUPROPION HYDROCHLORIDE 150 MG: 150 TABLET, EXTENDED RELEASE ORAL at 08:16

## 2021-03-07 RX ADMIN — ACETAMINOPHEN 650 MG: 325 TABLET, FILM COATED ORAL at 08:14

## 2021-03-07 RX ADMIN — HYDRALAZINE HYDROCHLORIDE 50 MG: 50 TABLET ORAL at 20:16

## 2021-03-07 RX ADMIN — UMECLIDINIUM 1 PUFF: 62.5 AEROSOL, POWDER ORAL at 08:18

## 2021-03-07 RX ADMIN — ALLOPURINOL 300 MG: 300 TABLET ORAL at 08:16

## 2021-03-07 RX ADMIN — HYDRALAZINE HYDROCHLORIDE 50 MG: 50 TABLET ORAL at 08:16

## 2021-03-07 RX ADMIN — DOCUSATE SODIUM 50 MG AND SENNOSIDES 8.6 MG 1 TABLET: 8.6; 5 TABLET, FILM COATED ORAL at 20:16

## 2021-03-07 RX ADMIN — GABAPENTIN 600 MG: 300 CAPSULE ORAL at 15:23

## 2021-03-07 RX ADMIN — ISOSORBIDE DINITRATE 10 MG: 10 TABLET ORAL at 20:16

## 2021-03-07 RX ADMIN — POTASSIUM CHLORIDE 60 MEQ: 750 TABLET, EXTENDED RELEASE ORAL at 08:16

## 2021-03-07 RX ADMIN — FLUTICASONE FUROATE AND VILANTEROL TRIFENATATE 1 PUFF: 100; 25 POWDER RESPIRATORY (INHALATION) at 08:18

## 2021-03-07 RX ADMIN — AMIODARONE HYDROCHLORIDE 200 MG: 200 TABLET ORAL at 08:15

## 2021-03-07 RX ADMIN — LORATADINE 10 MG: 10 TABLET ORAL at 08:16

## 2021-03-07 RX ADMIN — BUMETANIDE 4 MG: 2 TABLET ORAL at 08:15

## 2021-03-07 RX ADMIN — GABAPENTIN 300 MG: 300 CAPSULE ORAL at 08:15

## 2021-03-07 RX ADMIN — MONTELUKAST 10 MG: 10 TABLET, FILM COATED ORAL at 22:00

## 2021-03-07 RX ADMIN — HYDRALAZINE HYDROCHLORIDE 50 MG: 50 TABLET ORAL at 15:23

## 2021-03-07 RX ADMIN — ISOSORBIDE DINITRATE 10 MG: 10 TABLET ORAL at 15:23

## 2021-03-07 RX ADMIN — BUMETANIDE 4 MG: 2 TABLET ORAL at 15:23

## 2021-03-07 RX ADMIN — Medication 10 MG: at 22:04

## 2021-03-07 ASSESSMENT — MIFFLIN-ST. JEOR
SCORE: 1747
SCORE: 1750.63

## 2021-03-07 ASSESSMENT — ACTIVITIES OF DAILY LIVING (ADL)
ADLS_ACUITY_SCORE: 12

## 2021-03-07 NOTE — PROGRESS NOTES
[facility]   Cardiology II Service / Advanced Heart Failure  Daily Progress Note  Date of Service: 3/7/2021      Patient: Jim Willingham  MRN: [id number]  Admission Date: 2/8/2021  Hospital Day # 27    Assessment and Plan: Mr. Willingham is a 65 year old male with history of NICM, EF 20%, VT s/p CRT-D s/p HMII LVAD 6/19/2017. He was admitted 2/8/21 for worsening heart and renal function. He is listed as status 2E, awaiting heart transplant.  Exception due 3/9.    Today's plan:   - Continue PO Bumex 4 mg BID  - Continue Hydralazine 50 mg TID  - Reassess fluid status in AM.   - Continue to encourage OOB activities, tolerating well.     # Chronic systolic heart failure secondary to NICM s/p LVAD: Stage D, NYHA Class III, HM 2 LVAD.   - PO Bumex 4 mg BID  - Hydralazine to 50mg TID (decresed due to hypotension on 3/6)  - Isordil 10 mg TID  - Warfarin dosed per pharmacy  - Baby ASA daily     # CKD Stage III: Peak Creatinine 2.22  - Creatinine 1.43 today.     # Carpel Tunnel syndrome: s/p release 2/18/21, pain much improved.   - Continue Gabapentin 300mg AM, 600mg afternoon, 600 mg HS  - PRN Tylenol    # Hx of Afib/VT: CHADSVASC-5. INR 2.55 today.  - PO Amiodarone 200 mg/day  - Warfarin per pharmacy    # DMT2: Controlled with recent symptomatic hypoglycemia (50's).   - Continue HS BG checks + PRN if symptomatic  - C-peptide 13.9, pro insulin 18.4.   - No Humalog or Lantus for now.   - Endocrine following     Chronic/resolved conditions:  COPD/Asthma: pta Breo Ellipta, albuterol prn.   Rhinovirus: Completed 5 day course of Cefdinir. COVID negative 2/8. Resp PCR +rhinovirus. CXR 2/12 with no overt PNA. Stopped cefdinir 2/15. Repeat RVP per transplant ID, negative.   Depression: pta Bupropion  Right sided weakness, resolved. CT head negative for acute findings. Appreciate Neuro eval.   Staph Hominis Bacteremia. No need for surveillance blood cx per transplant ID.    Acute Gout flare: Anakinra 100 mg subcutaneous  "daily for 5 days completed, resolved. On daily Allopurinol       FEN: 2 gram sodium restricted  PROPHY:  Warfarin  LINES:  PIV  DISPO:  TBD post transplant  CODE STATUS:  FULL code.     =============================================================    Interval History/ROS:   Doing well today. Reports abdomen feels less bloated compared to yesterday. Breathing feels comfortable. Denies dizziness today. Reports voiding quite a bit last night and today. Denies acute pain, fever/chills, CP, palpitations, PND, orthopnea, cough, sore throat, N/V/D, constipation, issues with sleep or appetite, bleeding, or drive line concerns. He continues to get up OOB and walk 2-3 x daily and has also been on the stationary bike for 30 minutes and tolerated this well.     Last 24 hr care team notes reviewed.   ROS:  4 point ROS including Respiratory, CV, GI and , other than that noted in the HPI, is negative.     Medications: Reviewed in EPIC.     Physical Exam:   BP 94/80 (BP Location: Left arm)   Pulse 62   Temp 98.1  F (36.7  C) (Oral)   Resp 16   Ht 1.727 m (5' 8\")   Wt 97.8 kg (215 lb 8 oz)   SpO2 97%   BMI 32.77 kg/m    GENERAL: A&O x4. Not in distress.   HEENT: Eye symmetrical and free of discharge bilaterally. Mucous membranes moist and without lesions.  NECK: Supple and without lymphadenopathy. JVD 1/2-1 cm above clavicle at 90 degrees.   CV: RRR, S1S2 present with LVAD hum.    RESPIRATORY: Respirations regular, even, and unlabored. Lungs CTA throughout.   GI: Moderately distended with normoactive bowel sounds present in all quadrants.   EXTREMITIES: Trace BLE edema.   NEUROLOGIC: Alert and orientated x 4. No focal deficits.   MUSCULOSKELETAL: No joint swelling or tenderness.   SKIN: No jaundice. No suspicious rashes, lesions, bruising on visible skin    Data:  CMP  Recent Labs   Lab 03/07/21  0720 03/06/21  0619 03/05/21  0540 03/04/21  1512 03/04/21  0533 03/01/21  0457 03/01/21  0457    138 140  --  138   < > " 136   POTASSIUM 3.7 3.8 4.0  --  3.9   < > 3.8   CHLORIDE 108 105 107  --  105   < > 103   CO2 27 28 27  --  24   < > 27   ANIONGAP 6 5 6  --  8   < > 7   GLC 87 105* 102* 84 89   < > 92   BUN 31* 38* 43*  --  42*   < > 42*   CR 1.43* 1.37* 1.45*  --  1.54*   < > 1.64*   GFRESTIMATED 51* 54* 51*  --  47*   < > 44*   GFRESTBLACK 60* 63 59*  --  55*   < > 51*   QAMAR 8.8 8.6 8.5  --  8.7   < > 8.3*   MAG 2.3 2.5* 2.4*  --  2.5*   < > 2.4*   PROTTOTAL  --   --   --   --   --   --  6.1*   ALBUMIN  --   --   --   --   --   --  3.1*   BILITOTAL  --   --   --   --   --   --  0.4   ALKPHOS  --   --   --   --   --   --  95   AST  --   --   --   --   --   --  37   ALT  --   --   --   --   --   --  40    < > = values in this interval not displayed.     CBC  Recent Labs   Lab 03/07/21 0720 03/05/21 0540 03/03/21  0536 03/01/21  0457   WBC 4.7 4.9 5.0 5.5   RBC 3.70* 3.57* 3.44* 3.58*   HGB 10.7* 10.2* 9.5* 10.2*   HCT 35.0* 33.5* 32.0* 33.4*   MCV 95 94 93 93   MCH 28.9 28.6 27.6 28.5   MCHC 30.6* 30.4* 29.7* 30.5*   RDW 19.3* 19.9* 19.9* 19.5*    239 255 272     INR  Recent Labs   Lab 03/07/21 0720 03/06/21 0619 03/05/21  0540 03/04/21  0533   INR 2.55* 2.96* 2.65* 2.43*         Belkys Johansen, RN-BSN, DNP-Student      The patient was seen and evaluated with Belkys Johansen, JOSE student.  I agree with the above.      Shelia Means, JOSE, FNP-BC, CHFN  Advanced Heart Failure Nurse Practitioner  [facility]

## 2021-03-07 NOTE — PLAN OF CARE
D:pt up as tolerated, steady gait  I:encourage patient to walk hallway  A:pt deferred, plan to ambulate tomorrow  P:[facility]

## 2021-03-07 NOTE — PROGRESS NOTES
"Endocrine Progress Note  Patient: Jim Willingham   MRN: [id number]  Date of Service: 03/07/2021    HPI:  Jim Willingham is a 65 year old male with PMHx of non-ischemic cardiomyopathy with EF 20% c/b VT s/p CRT-D, placement of LVAD on 6/19/2017, chronic kidney disease stage III, type II diabetes, atrial fibrillation, COPD, depression, gastric bypass in 2003. Admitted with worsening of functional status, WALTER. on CKD, weight loss with concern for worsening heart failure.    Today he reports that he  did not have any further hypoglycemic episodes  his appetite continues to be good  he did not have any episodes of dizziness, blood pressure continues to be low  he was able to ambulate around the unit without any difficulty    Recent Labs   Lab 03/07/21  0720 03/06/21  2117 03/06/21  0619 03/05/21  2304 03/05/21  0540 03/04/21  2113 03/04/21  1512 03/04/21  1420 03/04/21  1404 03/04/21  0749 03/04/21  0533 03/03/21  0536 03/03/21  0536   GLC 87  --  105*  --  102*  --  84  --   --   --  89  --  102*   BGM  --  177*  --  153*  --  130*  --  137* 56* 101*  --    < >  --     < > = values in this interval not displayed.   We recommended to measure AM cortisol due to his past history of corticosteroid treatment. The levels has now been < 10 two days in a row.      ROS  Feeling better than when at home  He has been walking on the [place] today  Eating oK    Video Physical Examination at 1648:  BP (!) 82/65 (BP Location: Left arm)   Pulse 67   Temp 97.8  F (36.6  C)   Resp 16   Ht 1.727 m (5' 8\")   Wt 98.1 kg (216 lb 4.8 oz)   SpO2 97%   BMI 32.89 kg/m    GENERAL:  Sitting up on the side of the bed, in  no distress; He is eating something  SKIN: Visible skin clear. No significant rash, abnormal pigmentation or lesions.  EYES: Eyes grossly normal to inspection.   RESP: No audible wheeze, cough, or visible cyanosis.  No visible retractions or increased work of breathing.    NEURO:   Awake, alert, responds appropriately to " questions.  Mentation and speech fluent.  PSYCH:affect normal    Medications:  Reviewed    Endocrine Labs:  hemoglobin A1c: 6.2% on 1/7/21 2/1/2021 TSH 3.52  3/4/21:  POC glucose 56 @1404 (given apple juice, abhishek crackers)  POC glucose 137 @1420     Plasma glucose 84 @1512  C-peptide: 13.9  Pro insulin: 18.4    Cortisol 7.1 @ 1032 on 3/6/2021  Cortisol 7.3 @ 0720 on 3/7/2021    Assessment and plan:    1.  Low AM cortisol.  History of prolonged corticosteroid use (recently discontinued)  Had been on Prednisone 20 mg daily for about 7 years (for management of pain secondary to carpal tunnel syndrome), tapered down from Sep 2020 and stopped in Feb 2021    AM cortisol less than 10    The patient will further evaluation with cosyntropin stimulation test (250 mcg)    2. Concern for hypoglycemia, in a patient with history of type II diabetes:  Last HbA1c was 6.2% on 1/7/21  Has not received insulin glargine since 2/10  Last dose of insulin aspart was on 3/01  C-peptide and proinsulin levels were checked for assessment of hypoglycemia, 13.9 and 18.4 respectively (the patient was normoglycemic with post prandial plasma glucose of 84 at that time)    No further episodes of hypoglycemia    Adrenal insufficiency needs to be ruled out    Patient has good oral intake, has been encouraged to continue the same    Fingerstick glucose readings might not be accurate for assessment of hypoglycemia, if the patient has another glucose reading <55 (without insulin therapy), plasma glucose should be sent for confirmation of hypoglycemia prior to correction    While the patient is off insulin therapy, POC glucose readings above 60 are acceptable for the patient    3.  History of type 2 DM - as above    4.  History of bariatric surgery- Concern for postprandial hypoglycemia with history of gastric bypass is low, given that this patient's suspected hypoglycemic episode occurred 5-6 hours after the last meal    Due to the COVID 19 pandemic  this visit was a video visit in order to help prevent spread of infection in this high risk patient and the general population. The patient gave verbal consent for the visit today.   Platform used: Rollbar  Start time with fellow : 1255  Stop time: 1308  Total time: 13 minutes    Case discussed with MD Juana Robledo MD  Endocrinology Fellow  Pager number [phone]    Due to the COVID 19 pandemic this visit was video visit in order to help prevent spread of infection in this high risk patient and the general population.    I have independently reviewed and interpreted labsas indicated.     Chart review/prep time 1   Chart review/prep time 1  0913--0920.; 1639  Video visit Start time  1644  Visit Stop time  1650  13__ minutes spent on the date of the encounter doing chart review, history and exam, documentation and further activities as noted above.  Attending tie-in statement: Patient seen and examined by me, discussed with fellow whose note I have reviewed and amended.       Nusrat Mcfarland MD

## 2021-03-07 NOTE — PLAN OF CARE
D: Admitted with decompensated HF-c/o RESENDIZ/fatigue  PMH: NICM EF 20% c/b VT s/p CRT-D s/p HMII LVAD 6/19/2017 , Afib-s/p DCCV, CKD3,COPD  He is now listed status 2E for transplant     I: Monitored vitals and assessed pt status. Do not disturb orders [id number]  Running: SL'd  PRN: none     A: Monitor SR 70-80 w/1st degree AVB/BB, 0-13 PVC w/r PVC cplt, 0-9 PAC Last BM 3/5. Good UO. LVAD #s WNL-no issues or alarms overnight. Pt slept well all night-not awakened for assessment since pt has do not disturb orders and pt was already asleep when this writer took over care.. No issues or complaints    No intake/output data recorded.    Temp:  [97.6  F (36.4  C)-98.1  F (36.7  C)] 98.1  F (36.7  C)  Pulse:  [52-73] 72  Resp:  [16-18] 16  BP: ()/(44-75) 74/44  SpO2:  [90 %-99 %] 99 %      P: Continue to monitor Pt status and report changes to treatment team. Status 2E for heart transplant

## 2021-03-08 LAB
ALBUMIN SERPL-MCNC: 3.5 G/DL (ref 3.4–5)
ALBUMIN UR-MCNC: NEGATIVE MG/DL
ALP SERPL-CCNC: 109 U/L (ref 40–150)
ALT SERPL W P-5'-P-CCNC: 32 U/L (ref 0–70)
ANION GAP SERPL CALCULATED.3IONS-SCNC: 5 MMOL/L (ref 3–14)
APPEARANCE UR: CLEAR
AST SERPL W P-5'-P-CCNC: 26 U/L (ref 0–45)
BILIRUB DIRECT SERPL-MCNC: 0.2 MG/DL (ref 0–0.2)
BILIRUB SERPL-MCNC: 0.6 MG/DL (ref 0.2–1.3)
BILIRUB UR QL STRIP: NEGATIVE
BUN SERPL-MCNC: 36 MG/DL (ref 7–30)
CALCIUM SERPL-MCNC: 8.8 MG/DL (ref 8.5–10.1)
CHLORIDE SERPL-SCNC: 105 MMOL/L (ref 94–109)
CO2 SERPL-SCNC: 27 MMOL/L (ref 20–32)
COLOR UR AUTO: NORMAL
CORTICOSTER 1H P 250 UG ACTH SERPL-SCNC: 23.9 UG/DL
CORTICOSTER 30M P 250 UG ACTH SERPL-SCNC: 7.4 UG/DL
CREAT SERPL-MCNC: 1.49 MG/DL (ref 0.66–1.25)
GFR SERPL CREATININE-BSD FRML MDRD: 49 ML/MIN/{1.73_M2}
GLUCOSE BLDC GLUCOMTR-MCNC: 148 MG/DL (ref 70–99)
GLUCOSE SERPL-MCNC: 124 MG/DL (ref 70–99)
GLUCOSE UR STRIP-MCNC: NEGATIVE MG/DL
HGB UR QL STRIP: NEGATIVE
HYALINE CASTS #/AREA URNS LPF: 1 /LPF (ref 0–2)
INR PPP: 2.59 (ref 0.86–1.14)
KETONES UR STRIP-MCNC: NEGATIVE MG/DL
LEUKOCYTE ESTERASE UR QL STRIP: NEGATIVE
MAGNESIUM SERPL-MCNC: 2.4 MG/DL (ref 1.6–2.3)
NITRATE UR QL: NEGATIVE
PH UR STRIP: 6 PH (ref 5–7)
POTASSIUM SERPL-SCNC: 4 MMOL/L (ref 3.4–5.3)
PROT SERPL-MCNC: 6.7 G/DL (ref 6.8–8.8)
RBC #/AREA URNS AUTO: 0 /HPF (ref 0–2)
SODIUM SERPL-SCNC: 137 MMOL/L (ref 133–144)
SOURCE: NORMAL
SP GR UR STRIP: 1.01 (ref 1–1.03)
SQUAMOUS #/AREA URNS AUTO: <1 /HPF (ref 0–1)
UROBILINOGEN UR STRIP-MCNC: NORMAL MG/DL (ref 0–2)
WBC #/AREA URNS AUTO: 0 /HPF (ref 0–5)

## 2021-03-08 PROCEDURE — 36415 COLL VENOUS BLD VENIPUNCTURE: CPT | Performed by: STUDENT IN AN ORGANIZED HEALTH CARE EDUCATION/TRAINING PROGRAM

## 2021-03-08 PROCEDURE — 250N000013 HC RX MED GY IP 250 OP 250 PS 637: Performed by: NURSE PRACTITIONER

## 2021-03-08 PROCEDURE — 93750 INTERROGATION VAD IN PERSON: CPT | Performed by: NURSE PRACTITIONER

## 2021-03-08 PROCEDURE — 80076 HEPATIC FUNCTION PANEL: CPT | Performed by: STUDENT IN AN ORGANIZED HEALTH CARE EDUCATION/TRAINING PROGRAM

## 2021-03-08 PROCEDURE — 214N000001 HC R&B CCU UMMC

## 2021-03-08 PROCEDURE — 82533 TOTAL CORTISOL: CPT | Performed by: INTERNAL MEDICINE

## 2021-03-08 PROCEDURE — 250N000013 HC RX MED GY IP 250 OP 250 PS 637: Performed by: PHYSICIAN ASSISTANT

## 2021-03-08 PROCEDURE — 85610 PROTHROMBIN TIME: CPT | Performed by: STUDENT IN AN ORGANIZED HEALTH CARE EDUCATION/TRAINING PROGRAM

## 2021-03-08 PROCEDURE — 36415 COLL VENOUS BLD VENIPUNCTURE: CPT | Performed by: INTERNAL MEDICINE

## 2021-03-08 PROCEDURE — 80048 BASIC METABOLIC PNL TOTAL CA: CPT | Performed by: STUDENT IN AN ORGANIZED HEALTH CARE EDUCATION/TRAINING PROGRAM

## 2021-03-08 PROCEDURE — 250N000013 HC RX MED GY IP 250 OP 250 PS 637: Performed by: STUDENT IN AN ORGANIZED HEALTH CARE EDUCATION/TRAINING PROGRAM

## 2021-03-08 PROCEDURE — 82533 TOTAL CORTISOL: CPT | Performed by: STUDENT IN AN ORGANIZED HEALTH CARE EDUCATION/TRAINING PROGRAM

## 2021-03-08 PROCEDURE — 250N000011 HC RX IP 250 OP 636: Performed by: STUDENT IN AN ORGANIZED HEALTH CARE EDUCATION/TRAINING PROGRAM

## 2021-03-08 PROCEDURE — 250N000013 HC RX MED GY IP 250 OP 250 PS 637: Performed by: INTERNAL MEDICINE

## 2021-03-08 PROCEDURE — 81001 URINALYSIS AUTO W/SCOPE: CPT | Performed by: INTERNAL MEDICINE

## 2021-03-08 PROCEDURE — 99232 SBSQ HOSP IP/OBS MODERATE 35: CPT | Mod: 25 | Performed by: NURSE PRACTITIONER

## 2021-03-08 PROCEDURE — 99232 SBSQ HOSP IP/OBS MODERATE 35: CPT | Mod: GC | Performed by: INTERNAL MEDICINE

## 2021-03-08 PROCEDURE — 999N001017 HC STATISTIC GLUCOSE BY METER IP

## 2021-03-08 PROCEDURE — 83735 ASSAY OF MAGNESIUM: CPT | Performed by: STUDENT IN AN ORGANIZED HEALTH CARE EDUCATION/TRAINING PROGRAM

## 2021-03-08 RX ORDER — WARFARIN SODIUM 3 MG/1
6 TABLET ORAL
Status: COMPLETED | OUTPATIENT
Start: 2021-03-08 | End: 2021-03-08

## 2021-03-08 RX ADMIN — POTASSIUM CHLORIDE 60 MEQ: 750 TABLET, EXTENDED RELEASE ORAL at 08:32

## 2021-03-08 RX ADMIN — GABAPENTIN 600 MG: 300 CAPSULE ORAL at 21:57

## 2021-03-08 RX ADMIN — Medication 10 MG: at 21:57

## 2021-03-08 RX ADMIN — AMIODARONE HYDROCHLORIDE 200 MG: 200 TABLET ORAL at 08:32

## 2021-03-08 RX ADMIN — FLUTICASONE FUROATE AND VILANTEROL TRIFENATATE 1 PUFF: 100; 25 POWDER RESPIRATORY (INHALATION) at 08:33

## 2021-03-08 RX ADMIN — BUPROPION HYDROCHLORIDE 150 MG: 150 TABLET, EXTENDED RELEASE ORAL at 08:32

## 2021-03-08 RX ADMIN — WARFARIN SODIUM 6 MG: 3 TABLET ORAL at 18:11

## 2021-03-08 RX ADMIN — HYDRALAZINE HYDROCHLORIDE 50 MG: 50 TABLET ORAL at 08:32

## 2021-03-08 RX ADMIN — ISOSORBIDE DINITRATE 10 MG: 10 TABLET ORAL at 08:32

## 2021-03-08 RX ADMIN — HYDRALAZINE HYDROCHLORIDE 50 MG: 50 TABLET ORAL at 19:52

## 2021-03-08 RX ADMIN — GABAPENTIN 300 MG: 300 CAPSULE ORAL at 08:32

## 2021-03-08 RX ADMIN — COSYNTROPIN 250 MCG: 0.25 INJECTION, POWDER, LYOPHILIZED, FOR SOLUTION INTRAMUSCULAR; INTRAVENOUS at 08:37

## 2021-03-08 RX ADMIN — MONTELUKAST 10 MG: 10 TABLET, FILM COATED ORAL at 21:57

## 2021-03-08 RX ADMIN — UMECLIDINIUM 1 PUFF: 62.5 AEROSOL, POWDER ORAL at 08:33

## 2021-03-08 RX ADMIN — ISOSORBIDE DINITRATE 10 MG: 10 TABLET ORAL at 19:52

## 2021-03-08 RX ADMIN — LORATADINE 10 MG: 10 TABLET ORAL at 08:32

## 2021-03-08 RX ADMIN — BUMETANIDE 4 MG: 2 TABLET ORAL at 15:20

## 2021-03-08 RX ADMIN — ASPIRIN 81 MG CHEWABLE TABLET 81 MG: 81 TABLET CHEWABLE at 08:32

## 2021-03-08 RX ADMIN — BUMETANIDE 4 MG: 2 TABLET ORAL at 08:32

## 2021-03-08 RX ADMIN — DOCUSATE SODIUM 50 MG AND SENNOSIDES 8.6 MG 1 TABLET: 8.6; 5 TABLET, FILM COATED ORAL at 19:53

## 2021-03-08 RX ADMIN — ISOSORBIDE DINITRATE 10 MG: 10 TABLET ORAL at 14:10

## 2021-03-08 RX ADMIN — ALLOPURINOL 300 MG: 300 TABLET ORAL at 08:32

## 2021-03-08 RX ADMIN — GABAPENTIN 600 MG: 300 CAPSULE ORAL at 14:10

## 2021-03-08 RX ADMIN — HYDRALAZINE HYDROCHLORIDE 50 MG: 50 TABLET ORAL at 14:10

## 2021-03-08 ASSESSMENT — MIFFLIN-ST. JEOR: SCORE: 1767.41

## 2021-03-08 ASSESSMENT — ACTIVITIES OF DAILY LIVING (ADL)
ADLS_ACUITY_SCORE: 12

## 2021-03-08 NOTE — PROCEDURES
The patient's HeartMate LVAD was interrogated 3/8/2021  * Speed 9600 rpm   * Pulsatility index 3.5-6.4   * Power 4.2-7 Manuel   * Flow 5.8-6.7 L/minute   Fluid status: euvolemic/slightly hypervolemic   Alarms were reviewed, with no LVAD alarms or signs of pump malfunction.   The driveline exit site was covered, with dressing c/d/i.   All external components were inspected and showed no evidence of damage or malfunction, none replaced.   No changes to VAD settings made.        Shelia Means DNP, FNP-BC, CHFN  Advanced Heart Failure Nurse Practitioner  [facility] [place]

## 2021-03-08 NOTE — PROGRESS NOTES
[facility]   Cardiology II Service / Advanced Heart Failure  Daily Progress Note  Date of Service: 3/8/2021      Patient: Jim Willingham  MRN: [id number]  Admission Date: 2/8/2021  Hospital Day # 28    Assessment and Plan:  Mr. Jim Willingham is a 65yr old male with a history of NICM (LVEF < 30% per TTE 1/2021), s/p HM2 LVAD (6/2017), VT, AFib, DMII, CKD, and COPD who presented with worsening symptoms and for consideration of heart transplantation.  He has been listed for transplant, and is currently status 2E, BG O+.  Note that exception is due 3/24.      PLAN:  - no changes to plan of care today      Chronic systolic heart failure secondary to NCM (LVEF < 30% per TTE 1/2021)  Stage D, NYHA Class III  - ACEi/ARB/ARNi:  Deferred due to renal function  - Afterload reduction:  Hydralazine 50mg TID and isordil 10mg TID  - BB:  Contraindicated due to low cardiac output  - Aldosterone antagonist:  Deferred due to renal function  - SCD ppx:  CRT-d  - Fluid status:  Euvolemic, continue bumex 4mg po twice daily    S/p HMII LVAD (6/2017)  - Antiplatelet:  Aspirin 81mg daily  - Anticoagulation:  Warfarin, dosed per pharmacy with goal INR 2.5-3.  INR today 2.59.  - MAPs:    - LDH:  248 (3/7)    VT  AFib  HRs controlled.  - Continue amiodarone 200mg once daily  - BB deferred as noted above  - continue warfarin, as noted above    WALTER on CKD III, resolved  Creatinine 2s on admission, has improved and stabilized.  - continue diuresis, as noted above  - continue to trend BMP    DMII  Low cortisol levels  Concern for hypoglycemia, resolved  H/o bariatric surgery  Last HgbA1c 1/2021 was 6.2%.  Has been on prednisone for management of carpal tunnel syndrome for about 7 years, tapered down from 9/2020 and ultimately stopped 2/2021.  Recent symptomatic hypoglycemia (BGs 50s on 3/4), so endocrine was consulted.  C-peptide was 13.9, proinsulin was 18.4 (3/4, he was normoglycemic with post-prandial glucose 84 at that time).   He has not had any further hypoglycemia, bur endocrine sought to r/o adrenal insufficiency.  Serum cortisol levels have been low, so he underwent a cosyntropin stimulation test today.  - endocrine consult, appreciate rec's  - continue to monitor BGs  - per endo --> Fingerstick glucose readings might not be accurate for assessment of hypoglycemia, if the patient has another glucose reading <55 (without insulin therapy), plasma glucose should be sent for confirmation of hypoglycemia prior to correction.  While the patient is off insulin therapy, POC glucose readings above 60 are acceptable for the patient      OTHER:  Carpel tunnel, bilateral:  Evidence of thenar atrophy, s/p steroid injection 11/2020.  S/p bilateral release 2/18/21.  Appreciate ortho/plastics consults.  Continue gabapentin 300mg/600mg/600mg and tylenol as needed.  COPD/asthma:  Continue PTA breo and albuterol as needed.  Rhinovirus, resolved:  S/p 5-day course of cefdinir.  Repeat respiratory virus panel was negative.  Depression:  Continue PTA buproprion.  Staph Hominis Bacteremia:  No need for surveillance blood cx per transplant ID.   Acute Gout flare:  Anakinra x 5 days with resolution.  Continue Allopurinol         FEN: 3g Na diet  PROPHY:  Ambulate, warfarin  LINES:  PIV  DISPO:  Pending, currently listed for heart transplantation  CODE STATUS:  full    =============================================================    Interval History/ROS:   Mr. Willingham states that he feels well today.  He notes that his weight is up a few pounds, which he attributes to drinking more than usual yesterday, eating chili, and a walleye sandwich from [employer].  He feels well, and does not feel any bloating or other s/s fluid retention today.  His breathing has been good, and he denies SOB, PND, and orthopnea.  His appetite has been good, and he denies nausea, vomiting, diarrhea, and constipation.  He notes mild pain in his left ankle, which he is wondering if it could  "be gout, but states that the pain is not persistent nor troublesome.  He is willing to defer further treatment of possible gout at this time.  He is ambulating regularly, with no exertional concerns.  He otherwise denies chest pain, palpitations, dizziness, headaches, acute vision changes, fevers, chills, cough, sore throat, and signs of bleeding.    Last 24 hr care team notes reviewed.   ROS:  4 point ROS including Respiratory, CV, GI and , other than that noted in the HPI, is negative.     Medications: Reviewed in EPIC.     Physical Exam:   BP (!) 87/75 (BP Location: Left arm)   Pulse 70   Temp 98  F (36.7  C) (Oral)   Resp 18   Ht 1.727 m (5' 8\")   Wt 99.8 kg (220 lb)   SpO2 98%   BMI 33.45 kg/m    GENERAL: Appears alert and oriented times three. Sitting upright, NAD.  HEENT: Eye symmetrical and free of discharge bilaterally. Mucous membranes moist and without lesions.  NECK: Supple and without lymphadenopathy. JVD appears above clavicle when sitting upright.  CV: +mechanical LVAD hum, audible S1S2.  RESPIRATORY: Respirations regular, even, and unlabored. Lungs CTA throughout.   GI: Soft and non distended with normoactive bowel sounds present in all quadrants. No tenderness, rebound, guarding. No organomegaly.   EXTREMITIES: No peripheral edema. 2+ bilateral pedal pulses.   NEUROLOGIC: Alert and orientated x 3. CN II-XII grossly intact. No focal deficits.   MUSCULOSKELETAL: No joint swelling or tenderness.   SKIN: No jaundice. No rashes or lesions. Driveline covered, dressing c/d/i.    Data:  CMP  Recent Labs   Lab 03/07/21  0720 03/06/21  0619 03/05/21  0540 03/04/21  1512 03/04/21  0533    138 140  --  138   POTASSIUM 3.7 3.8 4.0  --  3.9   CHLORIDE 108 105 107  --  105   CO2 27 28 27  --  24   ANIONGAP 6 5 6  --  8   GLC 87 105* 102* 84 89   BUN 31* 38* 43*  --  42*   CR 1.43* 1.37* 1.45*  --  1.54*   GFRESTIMATED 51* 54* 51*  --  47*   GFRESTBLACK 60* 63 59*  --  55*   QAMAR 8.8 8.6 8.5  --  8.7 "   MAG 2.3 2.5* 2.4*  --  2.5*     CBC  Recent Labs   Lab 03/07/21  0720 03/05/21  0540 03/03/21  0536   WBC 4.7 4.9 5.0   RBC 3.70* 3.57* 3.44*   HGB 10.7* 10.2* 9.5*   HCT 35.0* 33.5* 32.0*   MCV 95 94 93   MCH 28.9 28.6 27.6   MCHC 30.6* 30.4* 29.7*   RDW 19.3* 19.9* 19.9*    239 255     INR  Recent Labs   Lab 03/07/21  0720 03/06/21  0619 03/05/21  0540 03/04/21  0533   INR 2.55* 2.96* 2.65* 2.43*       Patient discussed with Dr. Montgomery.      Shelia Means, DNP, NP-BC, CHFN  Advanced Heart Failure Nurse Practitioner  [facility]

## 2021-03-08 NOTE — PLAN OF CARE
D: Admitted with decompensated HF-c/o RESENDIZ/fatigue  PMH: NICM EF 20% c/b VT s/p CRT-D s/p HMII LVAD 6/19/2017 , Afib-s/p DCCV, CKD3,COPD  He is now listed status 2E for transplant     I: Monitored vitals and assessed pt status. Do not disturb orders [id number]  Running: SL'd  PRN: none     A: Monitor SR 60-80 w/1st degree AVB/BBB, 0-7 PVC w/cplt. 0-6 PAC w/cplt. Last BM 3/7. Good UO. LVAD #s WNL-no issues or alarms overnight. Pt appeared to sleep well. No issues or complaints    No intake/output data recorded.    Temp:  [97.8  F (36.6  C)-98.1  F (36.7  C)] 98  F (36.7  C)  Pulse:  [62-74] 63  Resp:  [16-18] 18  BP: (73-95)/(59-83) 87/75  SpO2:  [97 %-98 %] 98 %      P: Continue to monitor Pt status and report changes to treatment team.

## 2021-03-08 NOTE — PROGRESS NOTES
Status 2 Heart Extension Complete  03/08/2021  Listing Criteria:  Exception     Dr. Montgomery approved the following statement prior to the submission of Status 2 Exception form in [facility]:     65 M with NICM who had a HM3 implanted on 6/19/2017 at destination therapy (obesity). He initially thrived after surgery with significant functional improvement. Over the last 10 months, however, his functional status has deteriorated.   He has developed worsening heart failure symptoms associated with significant weight loss (50 lbs over the last year) with visible muscle wasting and worsening cardiorenal syndrome  (baseline cr 1.4 now close to 2.0). Last hemodynamics: RA18, PCWP 25, CI 2.0. While  hemodynamics improved with increased speed and increased afterload reduction, he continues to have extremely high diuretic requirements to maintain acceptable volume status which has resulted in worsening renal function on oral diuretics (note, no AI on echo and normal inflow and outflow velocities).   In addition to his poor hemodynamics, he has developed several other significant LVAD complications.  He developed recurrent bacteremia (staph hominis) requiring prolonged IV antibiotics (last positive culture 9/2020).  He also recently had several episodes of sustained VT and finally therapy for VF on 12/11/2020.   As he is a large blood group O, we are asking for status 2 E given multiple LVAD complications and risk of losing his window of candidacy for heart transplantation.     Status 2 Extension due 03/24/2021

## 2021-03-08 NOTE — PROGRESS NOTES
Admitted 2/8/21 for worsening decompensated HF. PMH NICM EF 20% c/b VT s/p HM III LVAD 2017, CKD3, COPD.     Status 2e heart txp list     Neuro: A&Ox4.   Cardiac: Afebrile, VSS; soft BP's. SR with 1st degree AV block and BBB. No chest pain or SOB reported. LVAD HM3 #'s WNL.   Respiratory: RA.   GI/: Voiding spontaneously. No BM this shift.   Diet/appetite: Tolerating 3g sodium, 2L FR diet. Denies nausea   Activity: Up ad francisco  Pain: Denies   Skin: No new deficits noted.  Lines: R PIV SL   Drains: none  Replacement: potassium     Pt has been resting comfortably, will continue to monitor and follow plan of care. Cards 2. Monitoring for adrenal function today; endocrinology following.

## 2021-03-08 NOTE — PROGRESS NOTES
"Plastic Surgery Progress Note    S: Doing well. No hand pain. Incisions healing, small scab. No longer wrapping. States pins and needle sensation gone, slowly improving sensation. Continues to develop improved  strength.     O: /74 (BP Location: Left arm)   Pulse 70   Temp 98  F (36.7  C) (Oral)   Resp 18   Ht 1.727 m (5' 8\")   Wt 99.8 kg (220 lb)   SpO2 97%   BMI 33.45 kg/m    Alert/oriented  NLB on RA  R wrist incision with small gap with healed over scab.   L wrist incision c/d/i.     AP: POD18 b/l carpal tunnel release. Improved symptoms. Incisions healed.     - Plastic surgery will sign off  - Please call/page if develops any issues/concerns   "

## 2021-03-08 NOTE — PLAN OF CARE
D: Admitted with decompensated HF-c/o RESENDIZ/fatigue. Now listed status 2E for transplant. Hx of NICM EF 20% c/b VT s/p CRT-D s/p HMII LVAD 6/19/2017 , Afib-s/p DCCV, CKD3, and COPD    I: Monitored vitals and assessed pt status.  PRN: Tylenol.    A: A0x4. VSS on RA, LVAD numbers WNL. Afebrile. Urinating adequately. Ambulated in hallway, no c/o SOB. C/o shoulder pain in AM, relieved by tylenol.    P: Continue to monitor Pt status and report changes to cards 2.

## 2021-03-08 NOTE — PLAN OF CARE
6C OT Cancel. Pt declining upon attempt this PM due to lunch present and scheduled phone call shortly. Will reschedule.

## 2021-03-09 ENCOUNTER — APPOINTMENT (OUTPATIENT)
Dept: OCCUPATIONAL THERAPY | Facility: CLINIC | Age: 66
DRG: 001 | End: 2021-03-09
Attending: INTERNAL MEDICINE
Payer: COMMERCIAL

## 2021-03-09 LAB
ANION GAP SERPL CALCULATED.3IONS-SCNC: 5 MMOL/L (ref 3–14)
BUN SERPL-MCNC: 38 MG/DL (ref 7–30)
CALCIUM SERPL-MCNC: 8.7 MG/DL (ref 8.5–10.1)
CHLORIDE SERPL-SCNC: 106 MMOL/L (ref 94–109)
CO2 SERPL-SCNC: 27 MMOL/L (ref 20–32)
CREAT SERPL-MCNC: 1.53 MG/DL (ref 0.66–1.25)
ERYTHROCYTE [DISTWIDTH] IN BLOOD BY AUTOMATED COUNT: 18.9 % (ref 10–15)
GFR SERPL CREATININE-BSD FRML MDRD: 47 ML/MIN/{1.73_M2}
GLUCOSE BLDC GLUCOMTR-MCNC: 134 MG/DL (ref 70–99)
GLUCOSE BLDC GLUCOMTR-MCNC: 83 MG/DL (ref 70–99)
GLUCOSE SERPL-MCNC: 113 MG/DL (ref 70–99)
HCT VFR BLD AUTO: 34.6 % (ref 40–53)
HGB BLD-MCNC: 10.4 G/DL (ref 13.3–17.7)
INR PPP: 2.64 (ref 0.86–1.14)
MAGNESIUM SERPL-MCNC: 2.4 MG/DL (ref 1.6–2.3)
MCH RBC QN AUTO: 28 PG (ref 26.5–33)
MCHC RBC AUTO-ENTMCNC: 30.1 G/DL (ref 31.5–36.5)
MCV RBC AUTO: 93 FL (ref 78–100)
PLATELET # BLD AUTO: 206 10E9/L (ref 150–450)
POTASSIUM SERPL-SCNC: 3.8 MMOL/L (ref 3.4–5.3)
RBC # BLD AUTO: 3.71 10E12/L (ref 4.4–5.9)
SODIUM SERPL-SCNC: 138 MMOL/L (ref 133–144)
WBC # BLD AUTO: 4.7 10E9/L (ref 4–11)

## 2021-03-09 PROCEDURE — 214N000001 HC R&B CCU UMMC

## 2021-03-09 PROCEDURE — 999N001017 HC STATISTIC GLUCOSE BY METER IP

## 2021-03-09 PROCEDURE — 250N000013 HC RX MED GY IP 250 OP 250 PS 637: Performed by: STUDENT IN AN ORGANIZED HEALTH CARE EDUCATION/TRAINING PROGRAM

## 2021-03-09 PROCEDURE — 97110 THERAPEUTIC EXERCISES: CPT | Mod: GO | Performed by: OCCUPATIONAL THERAPIST

## 2021-03-09 PROCEDURE — 85610 PROTHROMBIN TIME: CPT | Performed by: STUDENT IN AN ORGANIZED HEALTH CARE EDUCATION/TRAINING PROGRAM

## 2021-03-09 PROCEDURE — 250N000013 HC RX MED GY IP 250 OP 250 PS 637: Performed by: NURSE PRACTITIONER

## 2021-03-09 PROCEDURE — 250N000009 HC RX 250: Performed by: NURSE PRACTITIONER

## 2021-03-09 PROCEDURE — 250N000013 HC RX MED GY IP 250 OP 250 PS 637: Performed by: INTERNAL MEDICINE

## 2021-03-09 PROCEDURE — 83735 ASSAY OF MAGNESIUM: CPT | Performed by: STUDENT IN AN ORGANIZED HEALTH CARE EDUCATION/TRAINING PROGRAM

## 2021-03-09 PROCEDURE — 36415 COLL VENOUS BLD VENIPUNCTURE: CPT | Performed by: STUDENT IN AN ORGANIZED HEALTH CARE EDUCATION/TRAINING PROGRAM

## 2021-03-09 PROCEDURE — 250N000013 HC RX MED GY IP 250 OP 250 PS 637: Performed by: PHYSICIAN ASSISTANT

## 2021-03-09 PROCEDURE — 85027 COMPLETE CBC AUTOMATED: CPT | Performed by: STUDENT IN AN ORGANIZED HEALTH CARE EDUCATION/TRAINING PROGRAM

## 2021-03-09 PROCEDURE — 80048 BASIC METABOLIC PNL TOTAL CA: CPT | Performed by: STUDENT IN AN ORGANIZED HEALTH CARE EDUCATION/TRAINING PROGRAM

## 2021-03-09 PROCEDURE — 99232 SBSQ HOSP IP/OBS MODERATE 35: CPT | Mod: 25 | Performed by: NURSE PRACTITIONER

## 2021-03-09 PROCEDURE — 93750 INTERROGATION VAD IN PERSON: CPT | Performed by: NURSE PRACTITIONER

## 2021-03-09 RX ORDER — CETIRIZINE HYDROCHLORIDE 10 MG/1
10 TABLET ORAL DAILY
Status: DISCONTINUED | OUTPATIENT
Start: 2021-03-10 | End: 2021-05-06

## 2021-03-09 RX ORDER — CETIRIZINE HYDROCHLORIDE 10 MG/1
10 TABLET ORAL DAILY
Status: DISCONTINUED | OUTPATIENT
Start: 2021-03-09 | End: 2021-03-09

## 2021-03-09 RX ORDER — WARFARIN SODIUM 3 MG/1
6 TABLET ORAL
Status: COMPLETED | OUTPATIENT
Start: 2021-03-09 | End: 2021-03-09

## 2021-03-09 RX ADMIN — POTASSIUM CHLORIDE 60 MEQ: 750 TABLET, EXTENDED RELEASE ORAL at 08:51

## 2021-03-09 RX ADMIN — FLUTICASONE FUROATE AND VILANTEROL TRIFENATATE 1 PUFF: 100; 25 POWDER RESPIRATORY (INHALATION) at 08:51

## 2021-03-09 RX ADMIN — UMECLIDINIUM 1 PUFF: 62.5 AEROSOL, POWDER ORAL at 08:52

## 2021-03-09 RX ADMIN — HYDRALAZINE HYDROCHLORIDE 50 MG: 50 TABLET ORAL at 19:55

## 2021-03-09 RX ADMIN — WARFARIN SODIUM 6 MG: 3 TABLET ORAL at 18:51

## 2021-03-09 RX ADMIN — LORATADINE 10 MG: 10 TABLET ORAL at 08:51

## 2021-03-09 RX ADMIN — DOCUSATE SODIUM 50 MG AND SENNOSIDES 8.6 MG 1 TABLET: 8.6; 5 TABLET, FILM COATED ORAL at 19:56

## 2021-03-09 RX ADMIN — ACETAMINOPHEN 650 MG: 325 TABLET, FILM COATED ORAL at 03:56

## 2021-03-09 RX ADMIN — ISOSORBIDE DINITRATE 10 MG: 10 TABLET ORAL at 08:49

## 2021-03-09 RX ADMIN — BUPROPION HYDROCHLORIDE 150 MG: 150 TABLET, EXTENDED RELEASE ORAL at 08:50

## 2021-03-09 RX ADMIN — Medication 10 MG: at 22:28

## 2021-03-09 RX ADMIN — AMIODARONE HYDROCHLORIDE 200 MG: 200 TABLET ORAL at 08:51

## 2021-03-09 RX ADMIN — HYDRALAZINE HYDROCHLORIDE 50 MG: 50 TABLET ORAL at 16:12

## 2021-03-09 RX ADMIN — ASPIRIN 81 MG CHEWABLE TABLET 81 MG: 81 TABLET CHEWABLE at 08:49

## 2021-03-09 RX ADMIN — MONTELUKAST 10 MG: 10 TABLET, FILM COATED ORAL at 22:28

## 2021-03-09 RX ADMIN — GABAPENTIN 600 MG: 300 CAPSULE ORAL at 22:28

## 2021-03-09 RX ADMIN — GABAPENTIN 300 MG: 300 CAPSULE ORAL at 08:50

## 2021-03-09 RX ADMIN — BUMETANIDE 4 MG: 2 TABLET ORAL at 08:48

## 2021-03-09 RX ADMIN — GABAPENTIN 600 MG: 300 CAPSULE ORAL at 16:12

## 2021-03-09 RX ADMIN — ANAKINRA 100 MG: 100 INJECTION, SOLUTION SUBCUTANEOUS at 12:56

## 2021-03-09 RX ADMIN — HYDRALAZINE HYDROCHLORIDE 50 MG: 50 TABLET ORAL at 08:51

## 2021-03-09 RX ADMIN — ISOSORBIDE DINITRATE 10 MG: 10 TABLET ORAL at 19:55

## 2021-03-09 RX ADMIN — ACETAMINOPHEN 650 MG: 325 TABLET, FILM COATED ORAL at 12:56

## 2021-03-09 RX ADMIN — ALLOPURINOL 300 MG: 300 TABLET ORAL at 08:50

## 2021-03-09 RX ADMIN — BUMETANIDE 4 MG: 2 TABLET ORAL at 16:12

## 2021-03-09 RX ADMIN — ISOSORBIDE DINITRATE 10 MG: 10 TABLET ORAL at 16:12

## 2021-03-09 ASSESSMENT — MIFFLIN-ST. JEOR: SCORE: 1760.16

## 2021-03-09 ASSESSMENT — ACTIVITIES OF DAILY LIVING (ADL)
ADLS_ACUITY_SCORE: 12

## 2021-03-09 NOTE — PLAN OF CARE
D: Admitted with decompensated HF-c/o RESENDIZ/fatigue  PMH: NICM EF 20% c/b VT s/p CRT-D s/p HMII LVAD 6/19/2017 , Afib-s/p DCCV, CKD3,COPD  He is now listed status 2E for transplant     I: Monitored vitals and assessed pt status. Do not disturb orders [id number]  Running: PIV SL'd  PRN: Tylenol for (B)shoulder pain     A: Monitor SB/SR/ST  w/1st degree AVB/BBB, 0-15 PVC w/0-3 cplt, r/PAC, ? pAfib up to 12 beats. Last BM 3/7. Good UO. LVAD #s WNL-no issues or alarms overnight. Pt appeared to sleep well.       Temp:  [97.6  F (36.4  C)-99.2  F (37.3  C)] 97.6  F (36.4  C)  Pulse:  [69-74] 74  Resp:  [18] 18  BP: ()/() 88/82  SpO2:  [95 %-99 %] 99 %      P: Continue to monitor Pt status and report changes to treatment team.

## 2021-03-09 NOTE — PROGRESS NOTES
[facility]   Cardiology II Service / Advanced Heart Failure  Daily Progress Note  Date of Service: 3/9/2021      Patient: Jim Willingham  MRN: [id number]  Admission Date: 2/8/2021  Hospital Day # 29    Assessment and Plan:  Mr. Jim Willingham is a 65yr old male with a history of NICM (LVEF < 30% per TTE 1/2021), s/p HM2 LVAD (6/2017), VT, AFib, DMII, CKD, and COPD who presented with worsening symptoms and for consideration of heart transplantation.  He has been listed for transplant, and is currently status 2E, BG O+.  Note that exception is due 3/24.      Today's PLAN:  - Discontinue daily Claritin.   - Start Zyrtec 10 mg daily  - Restart Anakinra 100 mg subcutaneous daily x5 days.       # Chronic systolic heart failure secondary to NCM (LVEF < 30% per TTE 1/2021)  Stage D, NYHA Class III  - ACEi/ARB/ARNi:  Deferred due to renal function  - Afterload reduction:  Hydralazine 50mg TID and isordil 10mg TID  - BB:  Contraindicated due to low cardiac output  - Aldosterone antagonist:  Deferred due to renal function  - SCD ppx:  CRT-d  - Fluid status:  Euvolemic, continue bumex 4mg po twice daily    # S/p HMII LVAD (6/2017)  - Antiplatelet:  Aspirin 81mg daily  - Anticoagulation:  Warfarin, dosed per pharmacy with goal INR 2.5-3.  INR today 2.59.  - MAPs: 60-70's  - LDH:  248 (3/7)    # VT/AFib  HRs controlled.   - Continue amiodarone 200mg once daily  - BB deferred as noted above  - continue warfarin, as noted above    # WALTER on CKD III, resolved  Creatinine 2s on admission, has improved and stabilized.  - continue diuresis, as noted above  - continue to trend BMP    # DMII/Low cortisol levels/Concern for hypoglycemia, resolved/H/o bariatric surgery  Last HgbA1c 1/2021 was 6.2%.  Has been on prednisone for management of carpal tunnel syndrome for about 7 years, tapered down from 9/2020 and ultimately stopped 2/2021.  Recent symptomatic hypoglycemia (BGs 50s on 3/4), so endocrine was consulted.  C-peptide was  13.9, proinsulin was 18.4 (3/4, he was normoglycemic with post-prandial glucose 84 at that time).  He has not had any further hypoglycemia, bur endocrine sought to r/o adrenal insufficiency.  Serum cortisol levels have been low, so he underwent a cosyntropin stimulation test today.  - Endocrine consult, appreciate recommendations.  - Cosyntropin stimulation test normal, ruling out adrenal insufficiency  - Continue to monitor BGs  - Per endo --> Fingerstick glucose readings might not be accurate for assessment of hypoglycemia, if the patient has another glucose reading <55 (without insulin therapy), plasma glucose should be sent for confirmation of hypoglycemia prior to correction.  While the patient is off insulin therapy, POC glucose readings above 60 are acceptable for the patient    # Probable acute Gout flare: Pain in L heal becoming more acute over last 24 hours, similar to previous gout attacks per pt report. Anakinra x 5 days with resolution in February. Uric acid goal <5 only reached on 2/1/21. Per Rheumatology: Could consider re-dosing Anakinra if he develops a subsequent flare during hospital stay.Will continue to follow peripherally, please re-engage if Mr Willingham develops another episodes of acute joint pain.   - Continue Allopurinol.   - Start another 5 day course of subcutaneus Anakinra, ordered.    # Upper respiratory congestion/possible URI: Remains afebrile, WBC's normal. Feels like it could be allergies per pt report, but also has history of many bacterial URI's in the past.vCompleted 5 day course of Cefdinir last month. COVID negative 3/7. Resp PCR + rhinovirus 2/12, CXR 2/12 with no overt PNA. Stopped cefdinir 2/15. ID recommends   - Repeat RVP-3/5 per transplant ID, negative.  - Monitor WBC's  - Trial change in antihistamine --> D/C Claritin, start Zyrtec 10 mg daily.   - Monitor symptoms    OTHER:  Carpel tunnel, bilateral:  Evidence of thenar atrophy, s/p steroid injection 11/2020.  S/p  "bilateral release 2/18/21.  Appreciate ortho/plastics consults. Continue gabapentin 300mg/600mg/600mg and tylenol as needed.  COPD/asthma:  Continue PTA breo and albuterol as needed.  Rhinovirus, resolved:  S/p 5-day course of cefdinir.  Repeat respiratory virus panel was negative.  Depression:  Continue PTA buproprion.  Staph Hominis Bacteremia:  No need for surveillance blood cx per transplant ID.        FEN: 3g Na diet  PROPHY:  Ambulate, warfarin  LINES:  PIV  DISPO:  Pending, currently listed for heart transplantation  CODE STATUS:  full    =============================================================    Interval History/ROS:   Doing well today. Breathing feels comfortable and he does not feel like he is retaining fluid. Denies SOB, PND, and orthopnea. He has no exertional concerns. He reports mild to moderate pain in his left ankle/heel, has increased in intensity since Sunday and is now having trouble with bearing weight. He thinks this is an acute gout flairas his left heel has been a foci in the past. Interested in starting Anakinra again if this is possible. He is also concerned about increase in nasal congestion, drainage,  and increased sneezing starting yesterday. No fevers, chills, cough, sore throat. States nasal drainage is light yellow to clear in color, worse when waking in AM. He otherwise denies chest pain, palpitations, dizziness, headaches, acute vision changes, dizziness/lightheadedness, problems with appetite/sleep, signs of bleeding, or concerns at drive line.     Last 24 hr care team notes reviewed.   ROS:  4 point ROS including Respiratory, CV, GI and , other than that noted in the HPI, is negative.     Medications: Reviewed in EPIC.     Physical Exam:   BP 90/65 (BP Location: Left arm)   Pulse 74   Temp 97.7  F (36.5  C) (Oral)   Resp 18   Ht 1.727 m (5' 8\")   Wt 99.1 kg (218 lb 6.4 oz)   SpO2 96%   BMI 33.21 kg/m    GENERAL: Appears alert and oriented times three. Sitting " upright, NAD.  HEENT: Eye symmetrical and free of discharge bilaterally. Mucous membranes moist and without lesions. Nasal   NECK: Supple and without lymphadenopathy. JVD not detected when sitting at 90 degrees.   CV: +mechanical LVAD hum, audible S1/S2.  RESPIRATORY: Respirations regular, even, and unlabored. Lungs CTA throughout.   GI: Soft and non distended with normoactive bowel sounds present in all quadrants.  EXTREMITIES: No peripheral edema. 2+ bilateral pedal pulses. L ankle and heel without erythema or edema, but tender to touch.   NEUROLOGIC: Alert and orientated x 3. No focal deficits.   MUSCULOSKELETAL: No joint swelling or tenderness.   SKIN: No jaundice. No rashes or lesions. Driveline covered, dressing c/d/i.     Data:  CMP  Recent Labs   Lab 03/09/21  0518 03/08/21  0730 03/07/21  0720 03/06/21  0619    137 141 138   POTASSIUM 3.8 4.0 3.7 3.8   CHLORIDE 106 105 108 105   CO2 27 27 27 28   ANIONGAP 5 5 6 5   * 124* 87 105*   BUN 38* 36* 31* 38*   CR 1.53* 1.49* 1.43* 1.37*   GFRESTIMATED 47* 49* 51* 54*   GFRESTBLACK 55* 57* 60* 63   QAMAR 8.7 8.8 8.8 8.6   MAG 2.4* 2.4* 2.3 2.5*   PROTTOTAL  --  6.7*  --   --    ALBUMIN  --  3.5  --   --    BILITOTAL  --  0.6  --   --    ALKPHOS  --  109  --   --    AST  --  26  --   --    ALT  --  32  --   --      CBC  Recent Labs   Lab 03/09/21  0518 03/07/21  0720 03/05/21  0540 03/03/21  0536   WBC 4.7 4.7 4.9 5.0   RBC 3.71* 3.70* 3.57* 3.44*   HGB 10.4* 10.7* 10.2* 9.5*   HCT 34.6* 35.0* 33.5* 32.0*   MCV 93 95 94 93   MCH 28.0 28.9 28.6 27.6   MCHC 30.1* 30.6* 30.4* 29.7*   RDW 18.9* 19.3* 19.9* 19.9*    215 239 255     INR  Recent Labs   Lab 03/09/21  0518 03/08/21  0730 03/07/21  0720 03/06/21  0619   INR 2.64* 2.59* 2.55* 2.96*     Belkys Johansen, RN-BSN, DNP-Student    Patient discussed with Dr. Montgomery.

## 2021-03-09 NOTE — PROCEDURES
The patient's HeartMate LVAD was interrogated 3/9/2021  * Speed 5400 rpm   * Pulsatility index 3.5-3.7   * Power 4 Manuel   * Flow 4.9-5.3 L/minute   Fluid status: euvolemic   Alarms were reviewed, and notable for PI events per baseline.   The driveline exit site was inspected, dressing cdi.   All external components were inspected and showed no evidence of damage or malfunction, none replaced.   No changes to VAD settings made

## 2021-03-09 NOTE — PROGRESS NOTES
Endocrinology Fellow note    Reason for visit: congestive heart failure   Reason for Endocrine consult: hypoglycemia    ASSESSMENT/PLAN:   Jim Willingham is a 65 year old male with PMHx of non-ischemic cardiomyopathy with EF 20% c/b VT s/p CRT-D, placement of LVAD on 6/19/2017, chronic kidney disease stage III, type II diabetes, atrial fibrillation, COPD, depression, gastric bypass in 2003. Admitted with worsening of functional status, WALTER. on CKD, weight loss with concern for worsening heart failure, he is now a transplant candidate    # Concern for hypoglycemia, in a patient with history of type II diabetes:  Last HbA1c was 6.2% on 1/7/21. He has not received insulin glargine since 2/10  Last dose of insulin aspart was on 3/01  C-peptide and proinsulin levels were checked for assessment of hypoglycemia, 13.9 and 18.4 respectively (the patient was normoglycemic with post prandial plasma glucose of 84 at that time- after treatment for hypoglycemia)    No further episodes of hypoglycemia    Adrenal insufficiency ruled out (see below)    Unfortunately, Fingerstick glucose readings might not be accurate for assessment of hypoglycemia. if the patient has another glucose reading <55, plasma glucose should be sent for confirmation of hypoglycemia prior to correction along with C-peptide and insulin level.    While the patient is off insulin therapy, POC glucose readings above 60 are acceptable for the patient    # Low AM cortisol.  History of prolonged corticosteroid use (recently discontinued)  Had been on Prednisone 20 mg daily for about 7 years (for management of pain secondary to carpal tunnel syndrome), tapered down from Sep 2020 and stopped in Feb 2021    AM cortisol less than 10  cosyntropin stimulation test (250 mcg) done 3/8 showed appropriate stimulation of adrenals.  Reassurance provided.   Baseline cortisol 7.4   Cortisol at 60 min 23.9     #  History of bariatric surgery- Concern for postprandial hypoglycemia  with history of gastric bypass is low, given that this patient's suspected hypoglycemic episode occurred 5-6 hours after the last meal    Interval history:   Patient denies experiencing hypoglycemic symptoms over the last couple of days.  He endorses experiencing a stomach ache and a mild headache at the time his blood sugar is low.  Denies experiencing sweating, tremor or palpitations.  The symptoms resolve within 20 minutes of having something sweet to eat or drink.  The hypoglycemic symptoms never occur during the night.  The patient cannot establish a correlation of whether they occur before or after eating.  As per patient, his diet significantly changed since being hospitalized.  At home, prior to this hospitalization, he used to order delivery food, generally fast food.  In the hospital, the food choices have been healthier.  The Lantus insulin dose he used to take at home was 6 units (prescribed 3 months prior to this hospitalization), but the patient reports taking it intermittently.  He was also prescribed HumaLog for correction, but he rarely had to use it.     At the time of the visit, he denied nausea, vomiting, diarrhea or constipation.  His appetite is good.    Endocrine team will sign off.  Please do not hesitate to contact us if hypoglycemia recurs.  The patient was instructed to try make a note of the relationship between food intake and the occurrence of hypoglycemia.    The case was discussed with my attending, Dr. Lester Loo MD  PGY-4 Endocrine Fellow  Pager # [phone]     The patient was interviewed by me via phone, independent of the fellow.  Total phone call duration 15 minutes, from 6 PM to 6:15 PM.    I, Clara Batista, was present with the fellow who participated in the service and in the documentation of the note.  I have verified the history and personally performed the physical exam and medical decision making.  I agree with the assessment and plan of care as  documented in the note.     Clara Batista MD

## 2021-03-10 LAB
ANION GAP SERPL CALCULATED.3IONS-SCNC: 6 MMOL/L (ref 3–14)
BUN SERPL-MCNC: 37 MG/DL (ref 7–30)
CALCIUM SERPL-MCNC: 8.7 MG/DL (ref 8.5–10.1)
CHLORIDE SERPL-SCNC: 105 MMOL/L (ref 94–109)
CO2 SERPL-SCNC: 28 MMOL/L (ref 20–32)
CREAT SERPL-MCNC: 1.57 MG/DL (ref 0.66–1.25)
GFR SERPL CREATININE-BSD FRML MDRD: 46 ML/MIN/{1.73_M2}
GLUCOSE BLDC GLUCOMTR-MCNC: 101 MG/DL (ref 70–99)
GLUCOSE BLDC GLUCOMTR-MCNC: 99 MG/DL (ref 70–99)
GLUCOSE SERPL-MCNC: 94 MG/DL (ref 70–99)
INR PPP: 2.39 (ref 0.86–1.14)
MAGNESIUM SERPL-MCNC: 2.5 MG/DL (ref 1.6–2.3)
POTASSIUM SERPL-SCNC: 4 MMOL/L (ref 3.4–5.3)
SODIUM SERPL-SCNC: 140 MMOL/L (ref 133–144)

## 2021-03-10 PROCEDURE — 80048 BASIC METABOLIC PNL TOTAL CA: CPT | Performed by: STUDENT IN AN ORGANIZED HEALTH CARE EDUCATION/TRAINING PROGRAM

## 2021-03-10 PROCEDURE — 85610 PROTHROMBIN TIME: CPT | Performed by: STUDENT IN AN ORGANIZED HEALTH CARE EDUCATION/TRAINING PROGRAM

## 2021-03-10 PROCEDURE — 250N000013 HC RX MED GY IP 250 OP 250 PS 637: Performed by: PHYSICIAN ASSISTANT

## 2021-03-10 PROCEDURE — 250N000013 HC RX MED GY IP 250 OP 250 PS 637: Performed by: INTERNAL MEDICINE

## 2021-03-10 PROCEDURE — 250N000009 HC RX 250: Performed by: NURSE PRACTITIONER

## 2021-03-10 PROCEDURE — 250N000013 HC RX MED GY IP 250 OP 250 PS 637: Performed by: STUDENT IN AN ORGANIZED HEALTH CARE EDUCATION/TRAINING PROGRAM

## 2021-03-10 PROCEDURE — 214N000001 HC R&B CCU UMMC

## 2021-03-10 PROCEDURE — 83735 ASSAY OF MAGNESIUM: CPT | Performed by: STUDENT IN AN ORGANIZED HEALTH CARE EDUCATION/TRAINING PROGRAM

## 2021-03-10 PROCEDURE — 93750 INTERROGATION VAD IN PERSON: CPT | Performed by: NURSE PRACTITIONER

## 2021-03-10 PROCEDURE — 250N000013 HC RX MED GY IP 250 OP 250 PS 637: Performed by: NURSE PRACTITIONER

## 2021-03-10 PROCEDURE — 99232 SBSQ HOSP IP/OBS MODERATE 35: CPT | Mod: 25 | Performed by: NURSE PRACTITIONER

## 2021-03-10 PROCEDURE — 999N001017 HC STATISTIC GLUCOSE BY METER IP

## 2021-03-10 PROCEDURE — 36415 COLL VENOUS BLD VENIPUNCTURE: CPT | Performed by: STUDENT IN AN ORGANIZED HEALTH CARE EDUCATION/TRAINING PROGRAM

## 2021-03-10 RX ORDER — WARFARIN SODIUM 7.5 MG/1
7.5 TABLET ORAL
Status: COMPLETED | OUTPATIENT
Start: 2021-03-10 | End: 2021-03-10

## 2021-03-10 RX ADMIN — ISOSORBIDE DINITRATE 10 MG: 10 TABLET ORAL at 20:07

## 2021-03-10 RX ADMIN — GABAPENTIN 300 MG: 300 CAPSULE ORAL at 08:54

## 2021-03-10 RX ADMIN — Medication 10 MG: at 22:05

## 2021-03-10 RX ADMIN — CETIRIZINE HYDROCHLORIDE 10 MG: 10 TABLET, FILM COATED ORAL at 08:53

## 2021-03-10 RX ADMIN — UMECLIDINIUM 1 PUFF: 62.5 AEROSOL, POWDER ORAL at 08:54

## 2021-03-10 RX ADMIN — GABAPENTIN 600 MG: 300 CAPSULE ORAL at 22:03

## 2021-03-10 RX ADMIN — HYDRALAZINE HYDROCHLORIDE 50 MG: 50 TABLET ORAL at 20:07

## 2021-03-10 RX ADMIN — DOCUSATE SODIUM 50 MG AND SENNOSIDES 8.6 MG 1 TABLET: 8.6; 5 TABLET, FILM COATED ORAL at 20:07

## 2021-03-10 RX ADMIN — ISOSORBIDE DINITRATE 10 MG: 10 TABLET ORAL at 08:53

## 2021-03-10 RX ADMIN — GABAPENTIN 600 MG: 300 CAPSULE ORAL at 14:06

## 2021-03-10 RX ADMIN — POTASSIUM CHLORIDE 60 MEQ: 750 TABLET, EXTENDED RELEASE ORAL at 08:53

## 2021-03-10 RX ADMIN — BUMETANIDE 4 MG: 2 TABLET ORAL at 15:53

## 2021-03-10 RX ADMIN — ASPIRIN 81 MG CHEWABLE TABLET 81 MG: 81 TABLET CHEWABLE at 08:53

## 2021-03-10 RX ADMIN — ANAKINRA 100 MG: 100 INJECTION, SOLUTION SUBCUTANEOUS at 11:04

## 2021-03-10 RX ADMIN — ISOSORBIDE DINITRATE 10 MG: 10 TABLET ORAL at 13:47

## 2021-03-10 RX ADMIN — BUPROPION HYDROCHLORIDE 150 MG: 150 TABLET, EXTENDED RELEASE ORAL at 08:53

## 2021-03-10 RX ADMIN — WARFARIN SODIUM 7.5 MG: 7.5 TABLET ORAL at 17:49

## 2021-03-10 RX ADMIN — BUMETANIDE 4 MG: 2 TABLET ORAL at 08:52

## 2021-03-10 RX ADMIN — ALLOPURINOL 300 MG: 300 TABLET ORAL at 08:53

## 2021-03-10 RX ADMIN — ACETAMINOPHEN 650 MG: 325 TABLET, FILM COATED ORAL at 13:51

## 2021-03-10 RX ADMIN — MONTELUKAST 10 MG: 10 TABLET, FILM COATED ORAL at 22:03

## 2021-03-10 RX ADMIN — AMIODARONE HYDROCHLORIDE 200 MG: 200 TABLET ORAL at 08:53

## 2021-03-10 RX ADMIN — FLUTICASONE FUROATE AND VILANTEROL TRIFENATATE 1 PUFF: 100; 25 POWDER RESPIRATORY (INHALATION) at 08:54

## 2021-03-10 RX ADMIN — ACETAMINOPHEN 650 MG: 325 TABLET, FILM COATED ORAL at 06:07

## 2021-03-10 RX ADMIN — HYDRALAZINE HYDROCHLORIDE 50 MG: 50 TABLET ORAL at 08:52

## 2021-03-10 RX ADMIN — HYDRALAZINE HYDROCHLORIDE 50 MG: 50 TABLET ORAL at 13:48

## 2021-03-10 ASSESSMENT — ACTIVITIES OF DAILY LIVING (ADL)
ADLS_ACUITY_SCORE: 12
ADLS_ACUITY_SCORE: 10

## 2021-03-10 ASSESSMENT — MIFFLIN-ST. JEOR: SCORE: 1766.51

## 2021-03-10 NOTE — PLAN OF CARE
D: Admitted with decompensated HF-c/o RESENDIZ/fatigue  PMH: NICM EF 20% c/b VT s/p CRT-D s/p HMII LVAD 6/19/2017 , Afib-s/p DCCV, CKD3,COPD  He is now listed status 2E for transplant     I: Monitored vitals and assessed pt status. Do not disturb orders [id number]  Running: PIV SL'd  PRN: Tylenol @ 0615     A: Monitor SB/SR 55-90 w/1st degree AVB/BBB,0-12 PVC.  Last BM 3/9. Good UO. LVAD #s WNL-no issues or alarms overnight. Pt appeared to sleep well.     No intake/output data recorded.    Temp:  [97.7  F (36.5  C)-98  F (36.7  C)] 98  F (36.7  C)  Pulse:  [55-81] 77  Resp:  [16-18] 16  BP: ()/(62-87) 88/68  SpO2:  [94 %-99 %] 94 %      P: Continue to monitor Pt status and report changes to treatment team.

## 2021-03-10 NOTE — PROGRESS NOTES
[facility]   Cardiology II Service / Advanced Heart Failure  Daily Progress Note  Date of Service: 3/9/2021      Patient: Jim Willingham  MRN: [id number]  Admission Date: 2/8/2021  Hospital Day # 30    Assessment and Plan:  Jim Willingham is a 65 year old male with history of NICM EF 20% c/b VT s/p CRT-D s/p HMII LVAD 6/19/2017 originally intended as destination therapy 2/2 obesity however with recent weight loss now candidate for transplantation. He is admitted for worsening functional status and renal function concerning for worsening heart failure. He is now listed status 2E for transplant, blood group O. Note: exception is due 3/24.    Today's Plan:  - no changes today    # Chronic systolic heart failure secondary to NCM (LVEF < 30% per TTE 1/2021)  # S/p HMII LVAD (6/2017)  #Listed status 2E for transplant due to multiple LVAD complications  Stage D, NYHA Class III. TTE 1/8/21 at 9400rpm, EF<30%m LVIDd 6.8cm, at least mildly reduced RV function, AoV closed without AI, mild-mod eccentric MR, dilated IVC without collapse. RHC 2/23 RA 7 PA 26/8(15) PCWP 6 Kee C/CO 4.6/2.2 Td CO/CI 5.5/2.6     - Fluid status:  Euvolemic, continue bumex 4mg po bid  - ACEi/ARB/ARNi:  Deferred due to renal function  - Afterload reduction:  Hydralazine 50mg TID and isordil 10mg TID  - BB:  Contraindicated due to low cardiac output  - Aldosterone antagonist:  Deferred due to renal function  - SCD ppx:  CRT-d  - Antiplatelet:  Aspirin 81mg daily  - Anticoagulation:  Warfarin, dosed per pharmacy, goal INR 2.5-3.  INR today 2.4.  - MAPs: 60s-80s  - LDH:  248 (3/7)    # VT  # AFib  Currently in SR  - continue amiodarone 200mg daily  - BB deferred as above  - warfarin as above    # WALTER on CKD III, resolved  Cr 2s on admission, improved and stabilized.  - continue diuretic as above  - daily BMP for now    # DMII  # Low cortisol levels  # Concern for hypoglycemia, resolved  # H/o bariatric surgery [facility]  HgbA1c 1/2021 6.2%.   Has been on prednisone for management of carpal tunnel syndrome for about 7 years, tapered down from 9/2020 and ultimately stopped 2/2021.  Recent symptomatic hypoglycemia (BGs 50s on 3/4), so endocrine was consulted.  C-peptide was 13.9, proinsulin was 18.4 (3/4, he was normoglycemic with post-prandial glucose 84 at that time).  He has not had any further hypoglycemia, bur endocrine sought to r/o adrenal insufficiency. Serum cortisol levels have been low, so he underwent a cosyntropin stimulation test today.  - Endocrine consulted, signed off  - Cosyntropin stimulation test normal, ruling out adrenal insufficiency  - Continue to monitor BGs  - Per endo --> Fingerstick glucose readings might not be accurate for assessment of hypoglycemia, if the patient has another glucose reading <55 (without insulin therapy), plasma glucose should be sent for confirmation of hypoglycemia prior to correction.  While the patient is off insulin therapy, POC glucose readings above 60 are acceptable for the patient    # Acute gout flare  # Hx polyarticular gout flares  Pain in L heal more acute starting 3/9, similar to previous gout attacks per pt. Anakinra x 5 days with resolution in February. Uric acid goal <5 only reached on 2/1/21. Per Rheumatology: Could consider re-dosing Anakinra if he develops a subsequent flare during hospital stay.  - continue allopurinol.   - subcutaneus Anakinra 100 mg daily x5 days  - reconsult rheumatology if pain does not resolve    # Upper respiratory congestion/possible URI  # Rhinorrhea  Afebrile, WBC's normal. Feels like it could be allergies per pt report, but also has history of bacterial URI's in the past. Completed 5 day course of Cefdinir last month. COVID negative 3/7. Resp PCR + rhinovirus 2/12, CXR 2/12 with no overt PNA. Stopped cefdinir 2/15. Repeat RVP-3/5 per transplant ID, negative.  - daily CBC  - D/C'ed Claritin, started Zyrtec 10 mg daily.   - monitor symptoms    OTHER:  # Carpel  "tunnel, bilateral s/p bilateral release: Evidence of thenar atrophy, s/p steroid injection 11/2020.  S/p bilateral release 2/18/21. Appreciate ortho/plastics consults, signed off. Continue gabapentin 300mg/600mg/600mg and tylenol as needed.  # COPD/asthma:  Continue PTA breo and albuterol as needed.  # Rhinovirus, resolved:  Repeat respiratory virus panel was negative.  # Depression:  Continue PTA buproprion.  # Hx Staph Hominis Bacteremia:  No need for surveillance blood cx per transplant ID.      FEN: 3g Na diet  PROPHY:  Ambulate, warfarin  LINES:  PIV  DISPO:  Pending, currently listed for heart transplantation  CODE STATUS:  full    =============================================================    Interval History/ROS:   No overnight events. Feeling well today. Heel pain is better. Rhinorrhea also improves. Afebrile, denies cough, sore throat, shortness of breath, fever, chills. Weight stable.     Last 24 hr care team notes reviewed.   ROS:  4 point ROS including Respiratory, CV, GI and , other than that noted in the HPI, is negative.     Medications: Reviewed in EPIC.     Physical Exam:   BP (!) 88/68 (BP Location: Left arm)   Pulse 70   Temp 98  F (36.7  C) (Oral)   Resp 16   Ht 1.727 m (5' 8\")   Wt 99.7 kg (219 lb 12.8 oz)   SpO2 94%   BMI 33.42 kg/m       GENERAL: Appears comfortable, in no distress.  HEENT: Eye symmetrical, no discharge or icterus bilaterally. Mucous membranes moist and without lesions.  NECK: Supple, JVD not visible at 75 degrees.   CV: +mechanical LVAD hum.  RESPIRATORY: Respirations regular, even, and unlabored. Lungs CTA throughout.    GI: Soft and non distended with normoactive bowel sounds present in all quadrants. No tenderness, rebound, guarding.   EXTREMITIES: Trace BLE peripheral edema. Non pulsatile.   NEUROLOGIC: Alert and oriented x 3. No focal deficits.   MUSCULOSKELETAL: No joint swelling or tenderness.   SKIN: No jaundice. No rashes or lesions. "     Data:  CMP  Recent Labs   Lab 03/10/21  0541 03/09/21  0518 03/08/21  0730 03/07/21  0720    138 137 141   POTASSIUM 4.0 3.8 4.0 3.7   CHLORIDE 105 106 105 108   CO2 28 27 27 27   ANIONGAP 6 5 5 6   GLC 94 113* 124* 87   BUN 37* 38* 36* 31*   CR 1.57* 1.53* 1.49* 1.43*   GFRESTIMATED 46* 47* 49* 51*   GFRESTBLACK 53* 55* 57* 60*   QAMAR 8.7 8.7 8.8 8.8   MAG 2.5* 2.4* 2.4* 2.3   PROTTOTAL  --   --  6.7*  --    ALBUMIN  --   --  3.5  --    BILITOTAL  --   --  0.6  --    ALKPHOS  --   --  109  --    AST  --   --  26  --    ALT  --   --  32  --      CBC  Recent Labs   Lab 03/09/21  0518 03/07/21  0720 03/05/21  0540   WBC 4.7 4.7 4.9   RBC 3.71* 3.70* 3.57*   HGB 10.4* 10.7* 10.2*   HCT 34.6* 35.0* 33.5*   MCV 93 95 94   MCH 28.0 28.9 28.6   MCHC 30.1* 30.6* 30.4*   RDW 18.9* 19.3* 19.9*    215 239     INR  Recent Labs   Lab 03/10/21  0616 03/09/21  0518 03/08/21  0730 03/07/21  0720   INR 2.39* 2.64* 2.59* 2.55*     Time/Communication  I personally spent a total of 25 minutes. Of that 15 minutes was counseling/coordination of patient's care. Plan of care discussed with patient. See my note above for details.    Patient discussed with Dr. Montgomery.

## 2021-03-10 NOTE — PROCEDURES
The patient's HeartMate LVAD was interrogated 3/10/2021  * Speed 5400 rpm   * Pulsatility index 4.1-5.9  * Power 6.3-6.7 Manuel   * Flow 5.4-6.4 L/minute   Fluid status: euvolemic   Alarms were reviewed, and notable for PI events per baseline.   The driveline exit site was inspected, dressing cdi.   All external components were inspected and showed no evidence of damage or malfunction, none replaced.   No changes to VAD settings made

## 2021-03-10 NOTE — PLAN OF CARE
OT: Pt w/ low BG this AM, RN aware and in room w/ pt, OT will check back as available/appropriate or reschedule for 3/11.

## 2021-03-10 NOTE — PLAN OF CARE
D: Pt admitted 2/8/21 with decompensated HF with RESENDIZ and fatigue.    Past Medical History: NICM (EF 20%) c/b VT s/p CRT-D s/p HM2 LVAD placed 6/19/2017; Afib-s/p DCCV, COPD, and CKD3    I/A: A&Ox4, VSS, afebrile, on RA. Pt reported worsening pain in left ankle/heel area brought on likely by a gout flare-up that started a few days ago; Cards 2 aware and started patient on a 5-day course of Anakinra 100 mg subcutaneous daily. Pt reported this medication's pain relief effect was noticed within the first two doses the last time he had this. HM2 LVAD numbers WNL, no issues or concerns, dressing changed. Patient continues to void good amounts on bumex 4 mg po twice daily. Wife visited at bedside for several hours this morning.    P: Continue with current plan of care; contact Cards 2 for any changes or concerns.    Xiomara Yuan RN  Cardiology

## 2021-03-10 NOTE — PLAN OF CARE
No significant change in status today. Pt reports L heel gout pain improving w/subcutaneous anakinra and prn tylenol for bilateral hand/wrist incisions.  VAD#s wnl. Driveline site healed well-no drainage. INR 2.39.  Good urine output w/oral diuretic. Cr up slightly to 1.57.  Up independently in room and walked halls.  Continues to be status 2E for transplant.

## 2021-03-11 ENCOUNTER — APPOINTMENT (OUTPATIENT)
Dept: OCCUPATIONAL THERAPY | Facility: CLINIC | Age: 66
DRG: 001 | End: 2021-03-11
Attending: INTERNAL MEDICINE
Payer: COMMERCIAL

## 2021-03-11 ENCOUNTER — DOCUMENTATION ONLY (OUTPATIENT)
Dept: TRANSPLANT | Facility: CLINIC | Age: 66
End: 2021-03-11

## 2021-03-11 VITALS — WEIGHT: 219 LBS | BODY MASS INDEX: 33.3 KG/M2

## 2021-03-11 LAB
ANION GAP SERPL CALCULATED.3IONS-SCNC: 8 MMOL/L (ref 3–14)
BUN SERPL-MCNC: 35 MG/DL (ref 7–30)
CALCIUM SERPL-MCNC: 8.9 MG/DL (ref 8.5–10.1)
CHLORIDE SERPL-SCNC: 104 MMOL/L (ref 94–109)
CO2 SERPL-SCNC: 26 MMOL/L (ref 20–32)
CREAT SERPL-MCNC: 1.47 MG/DL (ref 0.66–1.25)
ERYTHROCYTE [DISTWIDTH] IN BLOOD BY AUTOMATED COUNT: 18.4 % (ref 10–15)
GFR SERPL CREATININE-BSD FRML MDRD: 50 ML/MIN/{1.73_M2}
GLUCOSE BLDC GLUCOMTR-MCNC: 198 MG/DL (ref 70–99)
GLUCOSE SERPL-MCNC: 103 MG/DL (ref 70–99)
HCT VFR BLD AUTO: 36.4 % (ref 40–53)
HGB BLD-MCNC: 11.3 G/DL (ref 13.3–17.7)
INR PPP: 2.22 (ref 0.86–1.14)
MAGNESIUM SERPL-MCNC: 2.4 MG/DL (ref 1.6–2.3)
MCH RBC QN AUTO: 29.1 PG (ref 26.5–33)
MCHC RBC AUTO-ENTMCNC: 31 G/DL (ref 31.5–36.5)
MCV RBC AUTO: 94 FL (ref 78–100)
PLATELET # BLD AUTO: 203 10E9/L (ref 150–450)
POTASSIUM SERPL-SCNC: 3.9 MMOL/L (ref 3.4–5.3)
RBC # BLD AUTO: 3.88 10E12/L (ref 4.4–5.9)
SODIUM SERPL-SCNC: 138 MMOL/L (ref 133–144)
WBC # BLD AUTO: 4.2 10E9/L (ref 4–11)

## 2021-03-11 PROCEDURE — 214N000001 HC R&B CCU UMMC

## 2021-03-11 PROCEDURE — 85027 COMPLETE CBC AUTOMATED: CPT | Performed by: STUDENT IN AN ORGANIZED HEALTH CARE EDUCATION/TRAINING PROGRAM

## 2021-03-11 PROCEDURE — 250N000013 HC RX MED GY IP 250 OP 250 PS 637: Performed by: NURSE PRACTITIONER

## 2021-03-11 PROCEDURE — 85610 PROTHROMBIN TIME: CPT | Performed by: STUDENT IN AN ORGANIZED HEALTH CARE EDUCATION/TRAINING PROGRAM

## 2021-03-11 PROCEDURE — 36415 COLL VENOUS BLD VENIPUNCTURE: CPT | Performed by: STUDENT IN AN ORGANIZED HEALTH CARE EDUCATION/TRAINING PROGRAM

## 2021-03-11 PROCEDURE — 93750 INTERROGATION VAD IN PERSON: CPT | Performed by: NURSE PRACTITIONER

## 2021-03-11 PROCEDURE — 250N000013 HC RX MED GY IP 250 OP 250 PS 637: Performed by: PHYSICIAN ASSISTANT

## 2021-03-11 PROCEDURE — 250N000013 HC RX MED GY IP 250 OP 250 PS 637: Performed by: STUDENT IN AN ORGANIZED HEALTH CARE EDUCATION/TRAINING PROGRAM

## 2021-03-11 PROCEDURE — 250N000009 HC RX 250: Performed by: NURSE PRACTITIONER

## 2021-03-11 PROCEDURE — 99232 SBSQ HOSP IP/OBS MODERATE 35: CPT | Mod: 24 | Performed by: NURSE PRACTITIONER

## 2021-03-11 PROCEDURE — 250N000013 HC RX MED GY IP 250 OP 250 PS 637: Performed by: INTERNAL MEDICINE

## 2021-03-11 PROCEDURE — 999N001017 HC STATISTIC GLUCOSE BY METER IP

## 2021-03-11 PROCEDURE — 97110 THERAPEUTIC EXERCISES: CPT | Mod: GO | Performed by: OCCUPATIONAL THERAPIST

## 2021-03-11 PROCEDURE — 80048 BASIC METABOLIC PNL TOTAL CA: CPT | Performed by: STUDENT IN AN ORGANIZED HEALTH CARE EDUCATION/TRAINING PROGRAM

## 2021-03-11 PROCEDURE — 83735 ASSAY OF MAGNESIUM: CPT | Performed by: STUDENT IN AN ORGANIZED HEALTH CARE EDUCATION/TRAINING PROGRAM

## 2021-03-11 PROCEDURE — 97530 THERAPEUTIC ACTIVITIES: CPT | Mod: GO | Performed by: OCCUPATIONAL THERAPIST

## 2021-03-11 RX ORDER — WARFARIN SODIUM 10 MG/1
10 TABLET ORAL
Status: COMPLETED | OUTPATIENT
Start: 2021-03-11 | End: 2021-03-11

## 2021-03-11 RX ADMIN — GABAPENTIN 600 MG: 300 CAPSULE ORAL at 14:51

## 2021-03-11 RX ADMIN — GABAPENTIN 300 MG: 300 CAPSULE ORAL at 08:01

## 2021-03-11 RX ADMIN — DOCUSATE SODIUM 50 MG AND SENNOSIDES 8.6 MG 1 TABLET: 8.6; 5 TABLET, FILM COATED ORAL at 19:58

## 2021-03-11 RX ADMIN — ANAKINRA 100 MG: 100 INJECTION, SOLUTION SUBCUTANEOUS at 09:56

## 2021-03-11 RX ADMIN — BUPROPION HYDROCHLORIDE 150 MG: 150 TABLET, EXTENDED RELEASE ORAL at 08:01

## 2021-03-11 RX ADMIN — AMIODARONE HYDROCHLORIDE 200 MG: 200 TABLET ORAL at 08:01

## 2021-03-11 RX ADMIN — BUMETANIDE 4 MG: 2 TABLET ORAL at 08:00

## 2021-03-11 RX ADMIN — UMECLIDINIUM 1 PUFF: 62.5 AEROSOL, POWDER ORAL at 08:00

## 2021-03-11 RX ADMIN — HYDRALAZINE HYDROCHLORIDE 50 MG: 50 TABLET ORAL at 08:01

## 2021-03-11 RX ADMIN — GABAPENTIN 600 MG: 300 CAPSULE ORAL at 21:34

## 2021-03-11 RX ADMIN — ISOSORBIDE DINITRATE 10 MG: 10 TABLET ORAL at 19:58

## 2021-03-11 RX ADMIN — BUMETANIDE 4 MG: 2 TABLET ORAL at 16:06

## 2021-03-11 RX ADMIN — MONTELUKAST 10 MG: 10 TABLET, FILM COATED ORAL at 21:34

## 2021-03-11 RX ADMIN — FLUTICASONE FUROATE AND VILANTEROL TRIFENATATE 1 PUFF: 100; 25 POWDER RESPIRATORY (INHALATION) at 08:01

## 2021-03-11 RX ADMIN — CETIRIZINE HYDROCHLORIDE 10 MG: 10 TABLET, FILM COATED ORAL at 08:01

## 2021-03-11 RX ADMIN — HYDRALAZINE HYDROCHLORIDE 50 MG: 50 TABLET ORAL at 19:58

## 2021-03-11 RX ADMIN — ISOSORBIDE DINITRATE 10 MG: 10 TABLET ORAL at 08:01

## 2021-03-11 RX ADMIN — ALLOPURINOL 300 MG: 300 TABLET ORAL at 08:01

## 2021-03-11 RX ADMIN — POTASSIUM CHLORIDE 60 MEQ: 750 TABLET, EXTENDED RELEASE ORAL at 08:01

## 2021-03-11 RX ADMIN — WARFARIN SODIUM 10 MG: 10 TABLET ORAL at 17:47

## 2021-03-11 RX ADMIN — ASPIRIN 81 MG CHEWABLE TABLET 81 MG: 81 TABLET CHEWABLE at 08:01

## 2021-03-11 RX ADMIN — ACETAMINOPHEN 650 MG: 325 TABLET, FILM COATED ORAL at 13:10

## 2021-03-11 RX ADMIN — HYDRALAZINE HYDROCHLORIDE 50 MG: 50 TABLET ORAL at 14:51

## 2021-03-11 RX ADMIN — ISOSORBIDE DINITRATE 10 MG: 10 TABLET ORAL at 14:51

## 2021-03-11 ASSESSMENT — ACTIVITIES OF DAILY LIVING (ADL)
ADLS_ACUITY_SCORE: 10

## 2021-03-11 ASSESSMENT — MIFFLIN-ST. JEOR: SCORE: 1766.51

## 2021-03-11 NOTE — PLAN OF CARE
No significant change in status today. In good spirits; L heel gout pain mostly unchanged-tylenol x1. Continues with 5 day course of subcutaneous anakinra.   VAD #s wnl. Driveline site well healed;drsg C/D/I.  Continues on oral bumex. Cr 1.47. Good urine output.   Ambulating and independent with cares.  Continues to be status 2E for transplant.

## 2021-03-11 NOTE — PLAN OF CARE
Pt status 2E waiting for heart tx. Heartmate 2 with VSS and LVAD #s WNL. Paced rhythm. Bilateral wrists s/p carpal tunnel surgery healing well. Up ad francisco in the room. Bedtime bloods sugar 99, given snacks. A&O, very pleasant.

## 2021-03-11 NOTE — PROGRESS NOTES
D: stopped in patient room for routine. No VAD related questions or concerns at this time.  I: Discussed POC and provided support and listened to patient and care giver's thoughts and concerns.  P: Continue to follow patient and address any questions or concerns patient and or caregiver may have.

## 2021-03-11 NOTE — PROGRESS NOTES
[facility]   Cardiology II Service / Advanced Heart Failure  Daily Progress Note  Date of Service: 3/9/2021      Patient: Jim Willingham  MRN: [id number]  Admission Date: 2/8/2021  Hospital Day # 31    Assessment and Plan:  Jim Willingham is a 65 year old male with history of NICM EF 20% c/b VT s/p CRT-D s/p HMII LVAD 6/19/2017 originally intended as destination therapy 2/2 obesity however with recent weight loss now candidate for transplantation. He is admitted for worsening functional status and renal function concerning for worsening heart failure. He is now listed status 2E for transplant, blood group O. Note: exception is due 3/24.    Today's Plan:  - increase warfarin dose  - monitor heel pain, reengage rheum prn    # Chronic systolic heart failure secondary to NCM (LVEF < 30% per TTE 1/2021)  # S/p HMII LVAD (6/2017)  #Listed status 2E for transplant due to multiple LVAD complications  Stage D, NYHA Class III. TTE 1/8/21 at 9400rpm, EF<30%m LVIDd 6.8cm, at least mildly reduced RV function, AoV closed without AI, mild-mod eccentric MR, dilated IVC without collapse. RHC 2/23 RA 7 PA 26/8(15) PCWP 6 Kee C/CO 4.6/2.2 Td CO/CI 5.5/2.6     - Fluid status: euvolemic, continue bumex 4mg po bid  - ACEi/ARB/ARNi: deferred due to renal function  - Afterload reduction: hydralazine 50mg TID and isordil 10mg TID  - BB: contraindicated due to low cardiac output  - Aldosterone antagonist:  Deferred due to renal function  - SCD ppx:  CRT-d  - Antiplatelet: aspirin 81mg daily  - Anticoagulation: warfarin, dosed per pharmacy, goal INR 2.5-3.  INR today 2.2 and trending down, will increase dose/defer bridge today.   - MAPs: 80s  - LDH:  248 (3/7)    # VT  # AFib  Currently in SR  - continue amiodarone 200mg daily  - BB deferred as above  - warfarin as above    # WALTER on CKD III, resolved  Cr 2s on admission, improved and stabilized ~1.5.   - continue diuretic as above  - daily BMP for now    # DMII  # Low cortisol  levels  # Concern for hypoglycemia, resolved  # H/o bariatric surgery Sylvester En Y  HgbA1c 1/2021 6.2%.  Has been on prednisone for management of carpal tunnel syndrome for about 7 years, tapered down from 9/2020 and ultimately stopped 2/2021.  Recent symptomatic hypoglycemia (BGs 50s on 3/4), so endocrine was consulted.  C-peptide was 13.9, proinsulin was 18.4 (3/4, he was normoglycemic with post-prandial glucose 84 at that time).  He has not had any further hypoglycemia, bur endocrine sought to r/o adrenal insufficiency. Serum cortisol levels have been low, so he underwent a cosyntropin stimulation test today.  - Endocrine consulted, signed off  - Cosyntropin stimulation test normal, ruling out adrenal insufficiency  - Continue to monitor BGs  - Per endo --> Fingerstick glucose readings might not be accurate for assessment of hypoglycemia, if the patient has another glucose reading <55 (without insulin therapy), plasma glucose should be sent for confirmation of hypoglycemia prior to correction.  While the patient is off insulin therapy, POC glucose readings above 60 are acceptable for the patient    # Acute gout flare  # Hx polyarticular gout flares  Pain in L heal more acute starting 3/9, similar to previous gout attacks per pt. Anakinra x 5 days with resolution in February. Uric acid goal <5 only reached on 2/1/21. Per Rheumatology: Could consider re-dosing Anakinra if he develops a subsequent flare during hospital stay.  - continue allopurinol  - subcutaneus Anakinra 100 mg daily x5 days  - reconsult rheumatology if pain does not resolve    # Upper respiratory congestion/possible URI, improved  # Rhinorrhea, improved  Afebrile, WBC's normal. Feels like it could be allergies per pt report, but also has history of bacterial URI's in the past. Completed 5 day course of Cefdinir last month. COVID negative 3/7. Resp PCR + rhinovirus 2/12, CXR 2/12 with no overt PNA. Stopped cefdinir 2/15. Repeat RVP-3/5 per transplant  "ID, negative.  - daily CBC  - D/C'ed Claritin, started Zyrtec 10 mg daily  - monitor symptoms    OTHER:  # Carpel tunnel, bilateral s/p bilateral release: Evidence of thenar atrophy, s/p steroid injection 11/2020.  S/p bilateral release 2/18/21. Appreciate ortho/plastics consults, signed off. Continue gabapentin 300mg/600mg/600mg and tylenol as needed.  # COPD/asthma:  Continue PTA breo and albuterol as needed.  # Rhinovirus, resolved:  Repeat respiratory virus panel was negative.  # Depression:  Continue PTA buproprion.  # Hx Staph Hominis Bacteremia:  No need for surveillance blood cx per transplant ID.      FEN: 3g Na diet  PROPHY:  Ambulate, warfarin  LINES:  PIV  DISPO:  Pending, currently listed for heart transplantation  CODE STATUS:  full    =============================================================    Interval History/ROS:   No overnight events. Feeling fine today. No new complaints. Heel pain slightly better, walked on it some yesterday. Denies lightheadedness, shortness of breath. Weights are stable. Improved PI events over last 48 hours.     Last 24 hr care team notes reviewed.   ROS:  4 point ROS including Respiratory, CV, GI and , other than that noted in the HPI, is negative.     Medications: Reviewed in EPIC.     Physical Exam:   BP (!) 83/75   Pulse 69   Temp 97.8  F (36.6  C) (Oral)   Resp 18   Ht 1.727 m (5' 8\")   Wt 99.7 kg (219 lb 12.8 oz)   SpO2 99%   BMI 33.42 kg/m       GENERAL: Appears comfortable, in no distress.  HEENT: Eye symmetrical, no discharge or icterus bilaterally. Mucous membranes moist and without lesions.  NECK: Supple, JVD not visible at 75 degrees.   CV: +mechanical LVAD hum.  RESPIRATORY: Respirations regular, even, and unlabored. Lungs CTA throughout.    GI: Soft and non distended with normoactive bowel sounds present in all quadrants. No tenderness, rebound, guarding.   EXTREMITIES: Trace BLE peripheral edema. Non pulsatile.   NEUROLOGIC: Alert and oriented x 3. " No focal deficits.   MUSCULOSKELETAL: No joint swelling or tenderness.   SKIN: No jaundice. No rashes or lesions.     Data:  CMP  Recent Labs   Lab 03/11/21  0525 03/10/21  0541 03/09/21  0518 03/08/21  0730    140 138 137   POTASSIUM 3.9 4.0 3.8 4.0   CHLORIDE 104 105 106 105   CO2 26 28 27 27   ANIONGAP 8 6 5 5   * 94 113* 124*   BUN 35* 37* 38* 36*   CR 1.47* 1.57* 1.53* 1.49*   GFRESTIMATED 50* 46* 47* 49*   GFRESTBLACK 58* 53* 55* 57*   QAMAR 8.9 8.7 8.7 8.8   MAG 2.4* 2.5* 2.4* 2.4*   PROTTOTAL  --   --   --  6.7*   ALBUMIN  --   --   --  3.5   BILITOTAL  --   --   --  0.6   ALKPHOS  --   --   --  109   AST  --   --   --  26   ALT  --   --   --  32     CBC  Recent Labs   Lab 03/11/21  0525 03/09/21  0518 03/07/21  0720 03/05/21  0540   WBC 4.2 4.7 4.7 4.9   RBC 3.88* 3.71* 3.70* 3.57*   HGB 11.3* 10.4* 10.7* 10.2*   HCT 36.4* 34.6* 35.0* 33.5*   MCV 94 93 95 94   MCH 29.1 28.0 28.9 28.6   MCHC 31.0* 30.1* 30.6* 30.4*   RDW 18.4* 18.9* 19.3* 19.9*    206 215 239     INR  Recent Labs   Lab 03/11/21  0525 03/10/21  0616 03/09/21  0518 03/08/21  0730   INR 2.22* 2.39* 2.64* 2.59*     Time/Communication  I personally spent a total of 25 minutes. Of that 15 minutes was counseling/coordination of patient's care. Plan of care discussed with patient. See my note above for details.    Patient discussed with Dr. Montgomery.

## 2021-03-11 NOTE — PLAN OF CARE
D: Pt with h/y of NICM, EF 20 %, VT ,CRT-D,HM2 LVAD 6/19/17   Bilateral carpal tunnel syndrome, steroid injection 11/2020.  S/p bilateral release 2/18/21. Admitted for worsening functional status and renal function concerning for worsening heart failure per NP note. Acute gout flare ( L heal ). On status 2E for transplant.    I: Monitored vitals and assessed pt status.   Do not disturb active orders from  [id number].      A: A0x4. VSS, on  RA. SR 1 st D AVB. Afebrile.Denes pain,no nausea reported. On 3 gr Na diet.Blood sugars 103. LVAD #s WNL, no alarms over night.     Temp:  [97.5  F (36.4  C)-98.1  F (36.7  C)] 98.1  F (36.7  C)  Pulse:  [67-83] 71  Resp:  [16-18] 18  BP: (90-96)/(67-82) 91/81  SpO2:  [95 %-99 %] 99 %      P: Continue to monitor Pt status and report changes to treatment team.

## 2021-03-11 NOTE — PROCEDURES
The patient's HeartMate LVAD was interrogated 3/11/2021  * Speed 5400 rpm   * Pulsatility index 4-4.6  * Power 6.5 Manuel   * Flow 5.8-6.8 L/minute   Fluid status: euvolemic   Alarms were reviewed, and notable for PI events, less than baseline/improved.  The driveline exit site was inspected, dressing cdi.   All external components were inspected and showed no evidence of damage or malfunction, none replaced.   No changes to VAD settings made

## 2021-03-11 NOTE — PROGRESS NOTES
CLINICAL NUTRITION SERVICES - REASSESSMENT NOTE     Nutrition Prescription    RECOMMENDATIONS FOR MDs/PROVIDERS TO ORDER:  Consider checking the following labs with RNY GB hx: Vitamins A, B1, B12, D, and E. Zinc, copper, ionized calcium, folic acid, and iron labs.    Malnutrition Status:    Patient does not meet two of the established criteria necessary for diagnosing malnutrition    Recommendations already ordered by Registered Dietitian (RD):  Oral supplements    Future/Additional Recommendations:  1. Continue current diet, as ordered. Liberalize diet order if inadequate oral intake.   2. Monitor need for a fluid restriction, if warranted.  3. Monitor iron status.  4. Monitor BG control. DM 2. Hgb A1c of 6.2 on 1/7/21. BG was 103 on 3/11/21.   5. If TFs are needed (if/when post-op Tx), would rec place feeding tube (FT) via radiology due to RNY hx and initiate TFs, Nutren 1.5 (concentrated, maintenance TF formula). Initiate TFs at 15 mL/hr, advancing rate by 10 mL Q 8 hrs (or per provider discretion, pending hemodynamic stability) to goal rate of 65 mL/hr. Nutren 1.5 at goal 65 ml/hr to provide 2340 kcals (31 kcal/kg/day), 106 g PRO (1.4 g/kg/day), 1186 mL H2O, 275 g CHO and no fiber daily. If out of Nutren 1.5, send Osmolite 1.5 at goal of 65 mL/hr and order 1 pkt Prosource TF modular: 1560 mL/day, 2340+ kcals, 97+ g PRO, 1188 ml free H20, 317 g CHO, and 0 g fiber daily.     If begin tube feeds:    - Flush FT with 30 mL water Q 4 hrs for patency. Rec provider adjust free water flushes as needed, pending fluid status.   - Ensure K+/Mg++/Phos labs are ordered daily until TFs advance to goal infusion to evaluate for sx of refeeding with nutrition received. If lytes trend low, aggressively replace lytes.       - If not already ordered, order a multivitamin/mineral (certavite 15 mL/day via FT) to help ensure micronutrient needs being met with suspected hypermetabolic demands and potential interruptions to TF  "infusions.   - Monitor TF and possible need to adjust nutrition support regimen if necessary, pending medical course and nutrition status.       - Monitor need to check a metabolic cart study.     - Send a nutrition consult for \"Registered Dietitian to Order TF per Medical Nutrition Therapy Guidelines\" if desire RD to order TFs.   6. Order the following (Sylvester-en-y Gastric Bypass) if pt agreeable:    Multivitamin/minerals: adult dose 2 times daily    Iron: 45-60 mg elemental daily (18-36 mg daily if low risk) - may partly or fully be covered in multivitamin     Calcium Citrate containing vitamin D: 500 mg 3 times daily or 600 mg 2 times daily    Vitamin B12: sublingual form of at least 500 mcg daily or injection of 1000 mcg monthly     B-50 Complex daily     EVALUATION OF THE PROGRESS TOWARD GOALS   Diet: 3 g Sodium. Boost Glucose Control or Glucerna, two oral supplements at 14:00 (strawberry and chocolate).   Intake: Good diet tolerance. Flowsheets (% intake) indicate pt consuming 100% with a good appetite, consistently. Per RN on 3/8, appetite is good. Per [employer], pt is ordering two meals daily, on average. Pt has a fridge in his room. His wife brings food, at times. Per pt, has not yet tried the chocolate Glucerna. He was eating spaghetti from home at time of nutrition visit today (3/11).      NEW FINDINGS   GI: Last stool on 3/10. Formed, brown stools. Having zero to one stool per day, on average.  Wt hx: 118.4 kg (2/13/20), 106.2 kg (8/7/20), 103.9 kg (11/13/20), 94.8 kg (1/12/21), 99.5 kg (2/8/21, admit), 100.2 kg (2/15/21), 98.2 kg (2/22), 100 kg (3/2), 99.7 kg (3/11) - Pt had intentionally been trying to lose wt for Tx. Current wt is similar to admit wt. Also, fluid status changes and diuresis may affect weights. Pt on bumex.     MALNUTRITION  % Intake: No decreased intake noted  % Weight Loss: None noted  Subcutaneous Fat Loss: None observed, but difficult to assess with body habitus  Muscle Loss: None " observed, but difficult to assess with body habitus  Fluid Accumulation/Edema: Trace  Malnutrition Diagnosis: Patient does not meet two of the established criteria necessary for diagnosing malnutrition    Previous Goals   Patient to consume % of nutritionally adequate meal trays TID, or the equivalent with supplements/snacks.  Evaluation: Meeting.    Previous Nutrition Diagnosis  Predicted inadequate nutrient intake (protein-energy) related to food choices, LOS, and potential upcoming surgery and unknown NPO status duration.   Evaluation: Unresolved/unchanged    CURRENT NUTRITION DIAGNOSIS  Predicted inadequate nutrient intake (protein-energy) related to food choices, LOS, and potential upcoming surgery and unknown NPO status duration.     INTERVENTIONS  Implementation  Medical food supplement therapy: Briefly discussed oral supplements as pt was eating and talking to someone on the phone. He is interested in trying butter pecan Glucerna.     Goals  Patient to consume % of nutritionally adequate meal trays TID, or the equivalent with supplements/snacks.    Monitoring/Evaluation  Progress toward goals will be monitored and evaluated per protocol.     Nutrition will continue to follow.    Sana Costello, MS, RD, LD, [facility]   6C Pgr: [phone]

## 2021-03-12 ENCOUNTER — APPOINTMENT (OUTPATIENT)
Dept: OCCUPATIONAL THERAPY | Facility: CLINIC | Age: 66
DRG: 001 | End: 2021-03-12
Attending: INTERNAL MEDICINE
Payer: COMMERCIAL

## 2021-03-12 LAB
ANION GAP SERPL CALCULATED.3IONS-SCNC: 5 MMOL/L (ref 3–14)
BUN SERPL-MCNC: 34 MG/DL (ref 7–30)
CALCIUM SERPL-MCNC: 8.6 MG/DL (ref 8.5–10.1)
CHLORIDE SERPL-SCNC: 105 MMOL/L (ref 94–109)
CO2 SERPL-SCNC: 27 MMOL/L (ref 20–32)
CREAT SERPL-MCNC: 1.44 MG/DL (ref 0.66–1.25)
GFR SERPL CREATININE-BSD FRML MDRD: 51 ML/MIN/{1.73_M2}
GLUCOSE BLDC GLUCOMTR-MCNC: 105 MG/DL (ref 70–99)
GLUCOSE BLDC GLUCOMTR-MCNC: 106 MG/DL (ref 70–99)
GLUCOSE BLDC GLUCOMTR-MCNC: 158 MG/DL (ref 70–99)
GLUCOSE SERPL-MCNC: 98 MG/DL (ref 70–99)
INR PPP: 2.28 (ref 0.86–1.14)
LABORATORY COMMENT REPORT: NORMAL
MAGNESIUM SERPL-MCNC: 2.5 MG/DL (ref 1.6–2.3)
POTASSIUM SERPL-SCNC: 3.8 MMOL/L (ref 3.4–5.3)
SARS-COV-2 RNA RESP QL NAA+PROBE: NEGATIVE
SODIUM SERPL-SCNC: 138 MMOL/L (ref 133–144)
SPECIMEN SOURCE: NORMAL
URATE SERPL-MCNC: 4.8 MG/DL (ref 3.5–7.2)

## 2021-03-12 PROCEDURE — 97530 THERAPEUTIC ACTIVITIES: CPT | Mod: GO | Performed by: OCCUPATIONAL THERAPIST

## 2021-03-12 PROCEDURE — 99233 SBSQ HOSP IP/OBS HIGH 50: CPT | Mod: GC | Performed by: INTERNAL MEDICINE

## 2021-03-12 PROCEDURE — 83735 ASSAY OF MAGNESIUM: CPT | Performed by: STUDENT IN AN ORGANIZED HEALTH CARE EDUCATION/TRAINING PROGRAM

## 2021-03-12 PROCEDURE — 250N000013 HC RX MED GY IP 250 OP 250 PS 637: Performed by: NURSE PRACTITIONER

## 2021-03-12 PROCEDURE — 250N000013 HC RX MED GY IP 250 OP 250 PS 637: Performed by: STUDENT IN AN ORGANIZED HEALTH CARE EDUCATION/TRAINING PROGRAM

## 2021-03-12 PROCEDURE — 999N001017 HC STATISTIC GLUCOSE BY METER IP

## 2021-03-12 PROCEDURE — 214N000001 HC R&B CCU UMMC

## 2021-03-12 PROCEDURE — 85610 PROTHROMBIN TIME: CPT | Performed by: STUDENT IN AN ORGANIZED HEALTH CARE EDUCATION/TRAINING PROGRAM

## 2021-03-12 PROCEDURE — 80048 BASIC METABOLIC PNL TOTAL CA: CPT | Performed by: STUDENT IN AN ORGANIZED HEALTH CARE EDUCATION/TRAINING PROGRAM

## 2021-03-12 PROCEDURE — 93750 INTERROGATION VAD IN PERSON: CPT | Performed by: NURSE PRACTITIONER

## 2021-03-12 PROCEDURE — 97110 THERAPEUTIC EXERCISES: CPT | Mod: GO | Performed by: OCCUPATIONAL THERAPIST

## 2021-03-12 PROCEDURE — 250N000009 HC RX 250: Performed by: NURSE PRACTITIONER

## 2021-03-12 PROCEDURE — 250N000013 HC RX MED GY IP 250 OP 250 PS 637: Performed by: INTERNAL MEDICINE

## 2021-03-12 PROCEDURE — 99232 SBSQ HOSP IP/OBS MODERATE 35: CPT | Mod: 24 | Performed by: NURSE PRACTITIONER

## 2021-03-12 PROCEDURE — 36415 COLL VENOUS BLD VENIPUNCTURE: CPT | Performed by: STUDENT IN AN ORGANIZED HEALTH CARE EDUCATION/TRAINING PROGRAM

## 2021-03-12 PROCEDURE — U0003 INFECTIOUS AGENT DETECTION BY NUCLEIC ACID (DNA OR RNA); SEVERE ACUTE RESPIRATORY SYNDROME CORONAVIRUS 2 (SARS-COV-2) (CORONAVIRUS DISEASE [COVID-19]), AMPLIFIED PROBE TECHNIQUE, MAKING USE OF HIGH THROUGHPUT TECHNOLOGIES AS DESCRIBED BY CMS-2020-01-R: HCPCS | Performed by: INTERNAL MEDICINE

## 2021-03-12 PROCEDURE — 84550 ASSAY OF BLOOD/URIC ACID: CPT | Performed by: STUDENT IN AN ORGANIZED HEALTH CARE EDUCATION/TRAINING PROGRAM

## 2021-03-12 PROCEDURE — 250N000013 HC RX MED GY IP 250 OP 250 PS 637: Performed by: PHYSICIAN ASSISTANT

## 2021-03-12 PROCEDURE — U0005 INFEC AGEN DETEC AMPLI PROBE: HCPCS | Performed by: INTERNAL MEDICINE

## 2021-03-12 RX ORDER — LIDOCAINE 4 G/G
1 PATCH TOPICAL
Status: DISCONTINUED | OUTPATIENT
Start: 2021-03-12 | End: 2021-03-13

## 2021-03-12 RX ORDER — WARFARIN SODIUM 10 MG/1
10 TABLET ORAL
Status: COMPLETED | OUTPATIENT
Start: 2021-03-12 | End: 2021-03-12

## 2021-03-12 RX ADMIN — LIDOCAINE 1 PATCH: 560 PATCH PERCUTANEOUS; TOPICAL; TRANSDERMAL at 17:40

## 2021-03-12 RX ADMIN — BUMETANIDE 4 MG: 2 TABLET ORAL at 08:27

## 2021-03-12 RX ADMIN — UMECLIDINIUM 1 PUFF: 62.5 AEROSOL, POWDER ORAL at 08:22

## 2021-03-12 RX ADMIN — MONTELUKAST 10 MG: 10 TABLET, FILM COATED ORAL at 22:40

## 2021-03-12 RX ADMIN — HYDRALAZINE HYDROCHLORIDE 50 MG: 50 TABLET ORAL at 08:24

## 2021-03-12 RX ADMIN — GABAPENTIN 300 MG: 300 CAPSULE ORAL at 08:25

## 2021-03-12 RX ADMIN — ISOSORBIDE DINITRATE 10 MG: 10 TABLET ORAL at 20:21

## 2021-03-12 RX ADMIN — HYDRALAZINE HYDROCHLORIDE 50 MG: 50 TABLET ORAL at 14:24

## 2021-03-12 RX ADMIN — GABAPENTIN 600 MG: 300 CAPSULE ORAL at 22:41

## 2021-03-12 RX ADMIN — ASPIRIN 81 MG CHEWABLE TABLET 81 MG: 81 TABLET CHEWABLE at 08:27

## 2021-03-12 RX ADMIN — ACETAMINOPHEN 650 MG: 325 TABLET, FILM COATED ORAL at 12:17

## 2021-03-12 RX ADMIN — POTASSIUM CHLORIDE 60 MEQ: 750 TABLET, EXTENDED RELEASE ORAL at 08:28

## 2021-03-12 RX ADMIN — ACETAMINOPHEN 650 MG: 325 TABLET, FILM COATED ORAL at 06:07

## 2021-03-12 RX ADMIN — DOCUSATE SODIUM 50 MG AND SENNOSIDES 8.6 MG 1 TABLET: 8.6; 5 TABLET, FILM COATED ORAL at 20:21

## 2021-03-12 RX ADMIN — GABAPENTIN 600 MG: 300 CAPSULE ORAL at 14:24

## 2021-03-12 RX ADMIN — ISOSORBIDE DINITRATE 10 MG: 10 TABLET ORAL at 14:28

## 2021-03-12 RX ADMIN — BUPROPION HYDROCHLORIDE 150 MG: 150 TABLET, EXTENDED RELEASE ORAL at 08:25

## 2021-03-12 RX ADMIN — POLYETHYLENE GLYCOL 3350 17 G: 17 POWDER, FOR SOLUTION ORAL at 08:23

## 2021-03-12 RX ADMIN — FLUTICASONE FUROATE AND VILANTEROL TRIFENATATE 1 PUFF: 100; 25 POWDER RESPIRATORY (INHALATION) at 08:22

## 2021-03-12 RX ADMIN — CETIRIZINE HYDROCHLORIDE 10 MG: 10 TABLET, FILM COATED ORAL at 08:26

## 2021-03-12 RX ADMIN — WARFARIN SODIUM 10 MG: 10 TABLET ORAL at 17:39

## 2021-03-12 RX ADMIN — AMIODARONE HYDROCHLORIDE 200 MG: 200 TABLET ORAL at 08:26

## 2021-03-12 RX ADMIN — ANAKINRA 100 MG: 100 INJECTION, SOLUTION SUBCUTANEOUS at 10:33

## 2021-03-12 RX ADMIN — ISOSORBIDE DINITRATE 10 MG: 10 TABLET ORAL at 08:24

## 2021-03-12 RX ADMIN — Medication 10 MG: at 22:40

## 2021-03-12 RX ADMIN — HYDRALAZINE HYDROCHLORIDE 50 MG: 50 TABLET ORAL at 20:21

## 2021-03-12 RX ADMIN — BUMETANIDE 4 MG: 2 TABLET ORAL at 16:04

## 2021-03-12 RX ADMIN — ALLOPURINOL 300 MG: 300 TABLET ORAL at 08:27

## 2021-03-12 ASSESSMENT — ACTIVITIES OF DAILY LIVING (ADL)
ADLS_ACUITY_SCORE: 10

## 2021-03-12 ASSESSMENT — MIFFLIN-ST. JEOR: SCORE: 1774.22

## 2021-03-12 NOTE — PROCEDURES
The patient's HeartMate LVAD was interrogated 3/12/2021  * Speed 5400 rpm   * Pulsatility index 3.5-4.9  * Power 6.2-7.2 Manuel   * Flow 5.3-7.1 L/minute   Fluid status: euvolemic   Alarms were reviewed, and notable for PI events, less than baseline/improved.  The driveline exit site was inspected, dressing cdi.   All external components were inspected and showed no evidence of damage or malfunction, none replaced.   No changes to VAD settings made

## 2021-03-12 NOTE — PROGRESS NOTES
Transplant Social Work Services Progress Note      Date of Initial Social Work Evaluation: 2/10/2021  Collaborated with: Pt     Data: Pt is an LVAD pt now listed status 2E for heart transplant. Pt had bilateral carpal tunnel surgery 2/20. Pt working with OT and also walking the hallways independently and is independent in room. Writer walked with pt in the hallway.     Intervention: Supportive Visit   Assessment: Assessed coping and mental health needs with prolonged hospitalization as pt waits for transplant. Pt coping well and has no mental health concerns. Pt motivated to keeping active and is maintaining meaningful interactions with has adopted great granddaughter via video chats.    Education provided by SW:   Plan:    Discharge Plans in Progress: Ongoing Social Work support    Barriers to d/c plan: Pt waiting for heart transplant    Follow up Plan: CLIFF to continue to follow.

## 2021-03-12 NOTE — PROGRESS NOTES
[facility]   Cardiology II Service / Advanced Heart Failure  Daily Progress Note  Date of Service: 3/9/2021      Patient: Jim Willingham  MRN: [id number]  Admission Date: 2/8/2021  Hospital Day # 32    Assessment and Plan:  Jim Willingham is a 65 year old male with history of NICM EF 20% c/b VT s/p CRT-D s/p HMII LVAD 6/19/2017 originally intended as destination therapy 2/2 obesity however with recent weight loss now candidate for transplantation. He is admitted for worsening functional status and renal function concerning for worsening heart failure. He is now listed status 2E for transplant, blood group O. Note: exception is due 3/24.    Today's Plan:  -rheum reconsult given ongoing heel pain     # Chronic systolic heart failure secondary to NCM (LVEF < 30% per TTE 1/2021)  # S/p HMII LVAD (6/2017)  #Listed status 2E for transplant due to multiple LVAD complications  Stage D, NYHA Class III. TTE 1/8/21 at 9400rpm, EF<30%m LVIDd 6.8cm, at least mildly reduced RV function, AoV closed without AI, mild-mod eccentric MR, dilated IVC without collapse. RHC 2/23 RA 7 PA 26/8(15) PCWP 6 Kee C/CO 4.6/2.2 Td CO/CI 5.5/2.6     - Fluid status: euvolemic, bumex 4mg po bid  - ACEi/ARB/ARNi: deferred due to renal function  - Afterload reduction: hydralazine 50mg TID and isordil 10mg TID  - BB: contraindicated due to low cardiac output  - Aldosterone antagonist:  Deferred due to renal function  - SCD ppx:  CRT-d  - Antiplatelet: aspirin 81mg daily  - Anticoagulation: warfarin, dosed per pharmacy, goal INR 2.5-3.  INR today 2.3, defer bridging  - MAPs: 70-80s  - LDH:  248 (3/7)    # VT  # AFib  Currently in SR  - continue amiodarone 200mg daily  - BB deferred as above  - warfarin as above    # WALTER on CKD III, resolved  Cr 2s on admission, improved and stabilized ~1.5.   - continue diuretic as above  - daily BMP for now    # DMII  # Low cortisol levels  # Concern for hypoglycemia, resolved  # H/o bariatric surgery  Sylvester En Y  HgbA1c 1/2021 6.2%.  Has been on prednisone for management of carpal tunnel syndrome for about 7 years, tapered down from 9/2020 and ultimately stopped 2/2021.  Recent symptomatic hypoglycemia (BGs 50s on 3/4), so endocrine was consulted.  C-peptide was 13.9, proinsulin was 18.4 (3/4, he was normoglycemic with post-prandial glucose 84 at that time).  He has not had any further hypoglycemia, bur endocrine sought to r/o adrenal insufficiency. Serum cortisol levels have been low, so he underwent a cosyntropin stimulation test today.  - Endocrine consulted, signed off  - Cosyntropin stimulation test normal, ruling out adrenal insufficiency  - Continue to monitor BGs  - Per endo --> Fingerstick glucose readings might not be accurate for assessment of hypoglycemia, if the patient has another glucose reading <55 (without insulin therapy), plasma glucose should be sent for confirmation of hypoglycemia prior to correction.  While the patient is off insulin therapy, POC glucose readings above 60 are acceptable for the patient    # Acute gout flare  # Hx polyarticular gout flares  Pain in L heal more acute starting 3/9, similar to previous gout attacks per pt. Anakinra x 5 days with resolution in February. Uric acid goal <5 only reached on 2/1/21. Per Rheumatology: Could consider re-dosing Anakinra if he develops a subsequent flare during hospital stay.  - continue allopurinol  - subcutaneus Anakinra 100 mg daily x5 days  - reconsult rheumatology given increased pain    # Upper respiratory congestion/possible URI, improved  # Rhinorrhea, improved  Afebrile, WBC's normal. Feels like it could be allergies per pt report, but also has history of bacterial URI's in the past. Completed 5 day course of Cefdinir last month. COVID negative 3/7. Resp PCR + rhinovirus 2/12, CXR 2/12 with no overt PNA. Stopped cefdinir 2/15. Repeat RVP-3/5 per transplant ID, negative.  - daily CBC  - D/C'ed Claritin, started Zyrtec 10 mg  "daily  - monitor symptoms    OTHER:  # Carpel tunnel, bilateral s/p bilateral release: Evidence of thenar atrophy, s/p steroid injection 11/2020.  S/p bilateral release 2/18/21. Appreciate ortho/plastics consults, signed off. Continue gabapentin 300mg/600mg/600mg and tylenol as needed.  # COPD/asthma:  Continue PTA breo and albuterol as needed.  # Rhinovirus, resolved:  Repeat respiratory virus panel was negative.  # Depression:  Continue PTA buproprion.  # Hx Staph Hominis Bacteremia:  No need for surveillance blood cx per transplant ID.      FEN: 3g Na diet  PROPHY:  Ambulate, warfarin  LINES:  PIV  DISPO:  Pending, currently listed for heart transplantation  CODE STATUS:  full    =============================================================    Interval History/ROS:   No overnight events. Feeling well. Heel is more painful today. Feels like maybe he walked too much yesterday. No new complaints. LE edema stable no shortness of breath.      Last 24 hr care team notes reviewed.   ROS:  4 point ROS including Respiratory, CV, GI and , other than that noted in the HPI, is negative.     Medications: Reviewed in EPIC.     Physical Exam:   BP 92/83 (BP Location: Left arm)   Pulse 69   Temp 98.3  F (36.8  C) (Oral)   Resp 18   Ht 1.727 m (5' 8\")   Wt 100.5 kg (221 lb 8 oz)   SpO2 97%   BMI 33.68 kg/m       GENERAL: Appears comfortable, in no distress.  HEENT: Eye symmetrical, no discharge or icterus bilaterally. Mucous membranes moist and without lesions.  NECK: Supple, JVD not visible at 90 degrees.   CV: +mechanical LVAD hum.  RESPIRATORY: Respirations regular, even, and unlabored. Lungs CTA throughout.    GI: Soft and non distended with normoactive bowel sounds present in all quadrants. No tenderness, rebound, guarding.   EXTREMITIES: Trace BLE peripheral edema. Non pulsatile.   NEUROLOGIC: Alert and oriented x 3. No focal deficits.   MUSCULOSKELETAL: No joint swelling or tenderness.   SKIN: No jaundice. No " rashes or lesions.     Data:  CMP  Recent Labs   Lab 03/12/21  0605 03/11/21  0525 03/10/21  0541 03/09/21  0518 03/08/21  0730    138 140 138 137   POTASSIUM 3.8 3.9 4.0 3.8 4.0   CHLORIDE 105 104 105 106 105   CO2 27 26 28 27 27   ANIONGAP 5 8 6 5 5   GLC 98 103* 94 113* 124*   BUN 34* 35* 37* 38* 36*   CR 1.44* 1.47* 1.57* 1.53* 1.49*   GFRESTIMATED 51* 50* 46* 47* 49*   GFRESTBLACK 59* 58* 53* 55* 57*   AQMAR 8.6 8.9 8.7 8.7 8.8   MAG 2.5* 2.4* 2.5* 2.4* 2.4*   PROTTOTAL  --   --   --   --  6.7*   ALBUMIN  --   --   --   --  3.5   BILITOTAL  --   --   --   --  0.6   ALKPHOS  --   --   --   --  109   AST  --   --   --   --  26   ALT  --   --   --   --  32     CBC  Recent Labs   Lab 03/11/21  0525 03/09/21  0518 03/07/21  0720   WBC 4.2 4.7 4.7   RBC 3.88* 3.71* 3.70*   HGB 11.3* 10.4* 10.7*   HCT 36.4* 34.6* 35.0*   MCV 94 93 95   MCH 29.1 28.0 28.9   MCHC 31.0* 30.1* 30.6*   RDW 18.4* 18.9* 19.3*    206 215     INR  Recent Labs   Lab 03/12/21  0605 03/11/21  0525 03/10/21  0616 03/09/21  0518   INR 2.28* 2.22* 2.39* 2.64*     Time/Communication  I personally spent a total of 25 minutes. Of that 15 minutes was counseling/coordination of patient's care. Plan of care discussed with patient. See my note above for details.    Patient discussed with Dr. Mccarty.

## 2021-03-12 NOTE — CONSULTS
Rheumatology Consult Note    Jim Willingham MRN# [id number]   Age: 65 year old YOB: 1955     Date of Admission: 2/8/2021    Reason for consult: Gout    Requesting Physician: Renu Sears MD      Assessment & Plan:     ASSESSMENT:  Jim Willingham is a 65 year old male with a hx of NICM EF 20% c/b VT s/p CRT-D s/p LVAD 6/19/2017, COPD, CKD stage III, afib s/p DCCV on amniodarone and warfarin, and obesity that was admitted on 2/8 for decompensated HF.    Pt has a history of gout and was on allopurinol 300mg daily prior to admission. Earlier this hospitalization he had an episode of gout that was treated with 5 days of Anakinra. Of note, he was not on paradoxical flare prophylaxis afterwards due to contraindication to standard therapies with his significant heart and kidney comorbiditis. He was initiated on anakinra 100mg on 3/9 after several days of left heel pain so severe he could not walk. Physical exam is notable for bilateral ankle swelling (R=L), and isolated tenderness at the insertion of the L calcaneal tendon, worse with ankle flexion. His current exam is more consistent with calcaneal tendon inflammation, although he denies trauma or injury. He has no tenderness in the joint itself, which would be an unusual presentation of gout. It is possilbe that he had an acute gout flare that is now successfully treated after 3 days of anakinra. Since he is having gout flares so frequently (twice in one month), would consider continuing pt on daily anakinra for gout prophylaxis while he is in hospital. We will revisit these recommendations after his surgery.    DIAGNOSIS:  #Gout  #NICM EF 20% s/p LVAD  #Afib on amniodarone  #CKD Stage III  #COPD    PLAN:  - PT to advise on stretches for pt's calcaneal tendon  - Pt can try icing his L heel  - Obtain uric acid level  - Continue allopurinol 300mg daily  - Continue anakinra 100mg daily for 5 days and would consider continuation even after acute flare  with daily anakinra for paradoxical flare prophylaxis  - Will revisit these recommendations after pt's surgery    I discussed the findings and recommendations with the patient.  I communicated the assessment and plan to the consulting team.    Case seen and discussed with Dr. Pettit who agrees with above assessment and plan.     Thank you Renu Sears MD for for this interesting consult. Please contact us if there are any questions.     Sunshine Jordan  MS4    Staff Addendum:  This patient was interviewed and examined in the presence of the medical student who was acting as a scribe, and this note personally edited by me reflects our mutual impression. I personally performed the history and physical exam. I personally reviewed available lab and imaging studies.    Elbert Pettit MD  Rheumatology      HPI     Jim Willingham is a 65 year old male with a hx of NICM EF 20% c/b VT s/p CRT-D s/p LVAD 6/19/2017, COPD, CKD stage III, afib s/p DCCV on amniodarone and warfarin, and obesity that was admitted on 2/8 for decompensated HF. His LVAD was initially meant to be destination therapy but due to significant weight loss, he is now 2E for transplant. Rheum was consulted for gout management.    Per chart review, pt has a history of gout and most recently had a flare earlier this hospitalization. At that time he was treated with 5 days of anakinra 100mg and continued on allopurinol 300mg for gout ppx. He was not a candidate for paradoxical gout ppx secondary to his significant cardiac and kidney comorbidities.     Pt states that he has pain in his left heel that started around 3/7/2021. The pain became so bad that he could not even walk on his left foot. He was started on 5 days of anakinra by his primary team on 3/9. His pain improved with anakinra but has not completely resolved. He has some pain with walking, but overall he has been walking fine and has already reached 2000 steps today. He notes that the  pain is very localized to one spot on his heel. He endorses some swelling in the left ankle but no other joint involvement. He does not recall any injury or trauma. He has not previously had gout in his left heel, but notes that this feels similar to previous gout flares in other locations.    Patient denies any fevers, chills, morning stiffness, myalgias, SOB, CP.    HISTORY REVIEW:  Past Medical History:   Diagnosis Date     Bariatric surgery status 2003     Benign essential hypertension 05/11/2017     Bilateral carpal tunnel syndrome 11/02/2020     Cardiomyopathy, unspecified (H) 05/08/2017     CKD (chronic kidney disease) stage 3, GFR 30-59 ml/min 05/11/2017     COPD (chronic obstructive pulmonary disease) (H) 11/02/2020     Depression 05/11/2017     Diabetes mellitus (H) 1995     Gouty arthropathy, chronic, without tophi 11/02/2020     H/O gastric bypass 05/11/2017     ICD (implantable cardioverter-defibrillator), biventricular, in situ 05/11/2017     LVAD (left ventricular assist device) present (H)      Major depression, recurrent, chronic (H) 11/02/2020     NICM (nonischemic cardiomyopathy) (H)/ EF 20% 05/11/2017    ECHO: LVEDd. 7.66 cm, Restrictive pattern , Severe mitral valve regurgitation     CECILIA (obstructive sleep apnea) 05/11/2017     Paroxysmal atrial fibrillation (H) 05/11/2017     Paroxysmal VT (H) 05/11/2017     Rotator cuff tear arthropathy of both shoulders 11/02/2020     Uncomplicated asthma      Vitamin B12 deficiency (non anemic) 05/11/2017     Past Surgical History:   Procedure Laterality Date     ANESTHESIA CARDIOVERSION N/A 5/11/2020    Procedure: ANESTHESIA, FOR CARDIOVERSION @1100;  Surgeon: GENERIC ANESTHESIA PROVIDER;  Location: [facility] OR     CV RIGHT HEART CATH MEASUREMENTS RECORDED N/A 7/24/2019    Procedure: CV RIGHT HEART CATH;  Surgeon: Renu Sears MD;  Location: [facility] HEART CARDIAC CATH LAB     CV RIGHT HEART CATH MEASUREMENTS RECORDED N/A 8/5/2020    Procedure: CV RIGHT HEART  CATH;  Surgeon: Nicola Seth MD;  Location:  HEART CARDIAC CATH LAB     CV RIGHT HEART CATH MEASUREMENTS RECORDED N/A 2021    Procedure: CV RIGHT HEART CATH;  Surgeon: Jac Dover MD;  Location:  HEART CARDIAC CATH LAB     CV RIGHT HEART CATH MEASUREMENTS RECORDED N/A 2021    Procedure: Heart Cath Right Heart Cath;  Surgeon: Jeffrey Gibson MD;  Location:  HEART CARDIAC CATH LAB     GI SURGERY      Sylvester en Y     INSERT VENTRICULAR ASSIST DEVICE LEFT (HEARTMATE II) N/A 2017    Procedure: INSERT VENTRICULAR ASSIST DEVICE LEFT (HEARTMATE II);  Median Sternotomy Heartmate II Left Ventricular Assist Device Insertion on Pump Oxygenator;  Surgeon: Ronnie Quigley MD;  Location:  OR     ORTHOPEDIC SURGERY      right knee wired     PICC DOUBLE LUMEN PLACEMENT Right 2020    5FR PICC DL. Length-43cm (1cm out).     RELEASE CARPAL TUNNEL BILATERAL Bilateral 2021    Procedure: Bilateral carpal tunnel release;  Surgeon: Jermaine Brand MD;  Location:  OR     Family History   Problem Relation Age of Onset     Cerebrovascular Disease Mother 64     Diabetes Mother      Hypertension Mother      Coronary Artery Disease Father      Diabetes Type 2  Father      Obesity Brother      Obesity Brother      Cerebrovascular Disease Daughter 40     Social History     Socioeconomic History     Marital status:      Spouse name: Not on file     Number of children: Not on file     Years of education: Not on file     Highest education level: Not on file   Occupational History     Not on file   Social Needs     Financial resource strain: Not on file     Food insecurity     Worry: Not on file     Inability: Not on file     Transportation needs     Medical: Not on file     Non-medical: Not on file   Tobacco Use     Smoking status: Former Smoker     Quit date:      Years since quittin.2     Smokeless tobacco: Never Used   Substance and Sexual Activity     Alcohol use: No      Drug use: No     Sexual activity: Yes     Partners: Female   Lifestyle     Physical activity     Days per week: Not on file     Minutes per session: Not on file     Stress: Not on file   Relationships     Social connections     Talks on phone: Not on file     Gets together: Not on file     Attends Mosque service: Not on file     Active member of club or organization: Not on file     Attends meetings of clubs or organizations: Not on file     Relationship status: Not on file     Intimate partner violence     Fear of current or ex partner: Not on file     Emotionally abused: Not on file     Physically abused: Not on file     Forced sexual activity: Not on file   Other Topics Concern     Parent/sibling w/ CABG, MI or angioplasty before 65F 55M? No   Social History Narrative     Not on file     Patient Active Problem List   Diagnosis     Paroxysmal atrial fibrillation (H)     Type 2 diabetes mellitus with complication, with long-term current use of insulin (H)     Stage 3b chronic kidney disease     H/O gastric bypass     CECILIA (obstructive sleep apnea)     Vitamin B12 deficiency (non anemic)     Paroxysmal VT (H)     ICD (implantable cardioverter-defibrillator), Med[employer]onic Biventricular ICD - Not Dependent     NICM (nonischemic cardiomyopathy) (H)/ EF 20%     Heart failure (H)     LVAD (left ventricular assist device) present (H)     Long-term (current) use of anticoagulants [Z79.01]     Physical deconditioning     Chronic systolic congestive heart failure (H)     Iron deficiency anemia     Influenza     Dyspnea     Acute-on-chronic kidney injury (H)     Shortness of breath     WALTER (acute kidney injury) (H)     Bacteremia     Class 2 severe obesity due to excess calories with serious comorbidity and body mass index (BMI) of 35.0 to 35.9 in adult (H)     Bilateral carpal tunnel syndrome     Rotator cuff tear arthropathy of both shoulders     COPD (chronic obstructive pulmonary disease) (H)     Major depression,  "recurrent, chronic (H)     Gouty arthropathy, chronic, without tophi     Right carpal tunnel syndrome     Decompensated heart failure (H)     Allergies   Allergen Reactions     Beer Shortness Of Breath     Grass Shortness Of Breath     Ace Inhibitors Cough     Dust Mites Other (See Comments)     Asthma     Mold Other (See Comments)     Asthma     Penicillins Other (See Comments)     Unknown - childhood exposure    Tolerated Zosyn 9/18-9/20/2020     Sulfa Drugs Other (See Comments) and Unknown     Unknown childhood reaction         ROS     A 14 point ROS was performed with pertinent findings listed above.      Objective     PHYSICAL EXAM  BP (!) 76/62 (BP Location: Right arm)   Pulse 73   Temp 98  F (36.7  C) (Oral)   Resp 18   Ht 1.727 m (5' 8\")   Wt 100.5 kg (221 lb 8 oz)   SpO2 98%   BMI 33.68 kg/m    Wt Readings from Last 4 Encounters:   03/12/21 100.5 kg (221 lb 8 oz)   03/11/21 99.3 kg (219 lb)   02/05/21 98.3 kg (216 lb 11.2 oz)   01/26/21 103.3 kg (227 lb 12.8 oz)     Constitutional: WD-WN-WG cooperative  Eyes: nl EOM, PERRLA, vision, conjunctiva, sclera  ENT: nl external ears, nose, hearing, lips  Neck: no mass or thyroid enlargement  Resp: lungs clear to auscultation, nl to palpation  CV: LVAD hanging from neck, 1+ pitting edema in BLE  MSK:  On inspection, No visible redness or warmth in any joint. Swelling in bilateral ankles.  Hands: dorsal and palmar surfaces normal, able to spread fingers and make fist, no synovitis or tenderness in MCP, PIP or DIP joints, no swan neck or boutonnieres deforities  Wrist and Elbows: flexion and extension WNL, non tender   Shoulders: abduction preserved to 180 degrees, normal internal and external rotation, nontender AC joint, SC joint, bicipital tendon insertion.  Hips: normal flexion and internal and external rotation of hip with knees flexed.   Knees: flexion and extension of knee normal  Ankles: tender at the insertion of the L calcaneal tendon with increased " tenderness on ankle flexion, bilateral ankle swelling (L = R), no joint pain, normal ROM  Feet: non tender subtalar and talar joint, non tender MTP, PIP and DIP joints, normal dorsiflexion and plantarflexion  Strength/Sensory: normal strength 5/5 in proximal and distal muscle groups, normal sensation in all 4 extremities  Skin: no nail pitting, alopecia, rash, nodules or lesions, no nailfold hypertrophy or ragged edges  Neuro: grossly normal.   Psych: nl judgement, orientation, memory, affect.      DATA:    CBC:  Recent Labs   Lab Test 03/11/21  0525 03/09/21  0518 03/07/21  0720   WBC 4.2 4.7 4.7   RBC 3.88* 3.71* 3.70*   HGB 11.3* 10.4* 10.7*   HCT 36.4* 34.6* 35.0*   MCV 94 93 95   MCH 29.1 28.0 28.9   MCHC 31.0* 30.1* 30.6*   RDW 18.4* 18.9* 19.3*    206 215       BMP:  Recent Labs   Lab Test 03/12/21  0605 03/11/21  0525 03/10/21  0541    138 140   POTASSIUM 3.8 3.9 4.0   CHLORIDE 105 104 105   CO2 27 26 28   ANIONGAP 5 8 6   GLC 98 103* 94   BUN 34* 35* 37*   CR 1.44* 1.47* 1.57*   GFRESTIMATED 51* 50* 46*   QAMAR 8.6 8.9 8.7       LFT:  Recent Labs   Lab Test 03/08/21  0730 03/01/21  0457 02/26/21  0558   PROTTOTAL 6.7* 6.1* 6.3*   ALBUMIN 3.5 3.1* 3.3*   BILITOTAL 0.6 0.4 0.6   ALKPHOS 109 95 104   AST 26 37 42   ALT 32 40 38       No results found for: CKTOTAL  TSH   Date Value Ref Range Status   02/01/2021 3.52 0.40 - 4.00 mU/L Final     Lab Results   Component Value Date    URIC 3.9 02/01/2021    URIC 5.0 01/09/2021    URIC 4.5 12/21/2020       Inflammatory markers  Lab Results   Component Value Date    CRP 3.5 10/27/2020    CRP 11.0 10/20/2020    CRP 12.0 10/13/2020     Lab Results   Component Value Date    SED 10 10/27/2020    SED 8 10/20/2020    SED 10 10/13/2020     Ferritin   Date Value Ref Range Status   02/09/2021 33 26 - 388 ng/mL Final   07/31/2019 41 26 - 388 ng/mL Final   07/03/2019 46 26 - 388 ng/mL Final       UA RESULTS:  Recent Labs   Lab Test 03/08/21  1525 09/10/20  0921  01/16/20  0503   COLOR Light Yellow Yellow Yellow   APPEARANCE Clear Clear Clear   URINEGLC Negative Negative 30*   URINEBILI Negative Negative Negative   URINEKETONE Negative Negative 40*   SG 1.009 1.010 1.012   UBLD Negative Negative Negative   URINEPH 6.0 6.5 5.5   PROTEIN Negative 10* 30*   NITRITE Negative Negative Negative   LEUKEST Negative Negative Negative   RBCU 0 O - 2 0   WBCU 0 0 - 5 <1         Autoimmunity labs:     No results found for: RHF  No results found for: CCPIGG  No results found for: ANCA  No results found for: [id number]  No results found for: C6BWMCC  No results found for: BRITTANY  No results found for: DNA  No results found for: RNPIGG, SMIGG, SSAIGG, SSBIGG, SCLIGG    IMAGING:

## 2021-03-12 NOTE — PLAN OF CARE
Admitted 2/8 for SOB and worsening heart failure. Now listed status 2E for heart transplant.      Neuro: A&Ox4.   Cardiac: Paced rhythm. VSS. HM 2 numbers WNL.  Respiratory: Sating 90s on RA. Uses home CPAP.   GI/: Due to void. No BM this shift.  Diet/appetite: On 3 gram Na diet. Good appetite.  Activity:  Up independently.  Pain: Tylenol given x1 for bilateral shoulder and hip pain with relief.  Skin: Abrasion on left knee. Bilateral wrist incisions CRISPIN.  LDA's: PIV saline locked.     Plan: Continue with POC. Notify primary team with changes.

## 2021-03-13 LAB
ANION GAP SERPL CALCULATED.3IONS-SCNC: 8 MMOL/L (ref 3–14)
BUN SERPL-MCNC: 40 MG/DL (ref 7–30)
CALCIUM SERPL-MCNC: 8.4 MG/DL (ref 8.5–10.1)
CHLORIDE SERPL-SCNC: 105 MMOL/L (ref 94–109)
CO2 SERPL-SCNC: 26 MMOL/L (ref 20–32)
CREAT SERPL-MCNC: 1.52 MG/DL (ref 0.66–1.25)
ERYTHROCYTE [DISTWIDTH] IN BLOOD BY AUTOMATED COUNT: 18.2 % (ref 10–15)
GFR SERPL CREATININE-BSD FRML MDRD: 48 ML/MIN/{1.73_M2}
GLUCOSE BLDC GLUCOMTR-MCNC: 119 MG/DL (ref 70–99)
GLUCOSE BLDC GLUCOMTR-MCNC: 162 MG/DL (ref 70–99)
GLUCOSE SERPL-MCNC: 90 MG/DL (ref 70–99)
HCT VFR BLD AUTO: 34 % (ref 40–53)
HGB BLD-MCNC: 10.4 G/DL (ref 13.3–17.7)
INR PPP: 2.38 (ref 0.86–1.14)
MAGNESIUM SERPL-MCNC: 2.6 MG/DL (ref 1.6–2.3)
MCH RBC QN AUTO: 28.7 PG (ref 26.5–33)
MCHC RBC AUTO-ENTMCNC: 30.6 G/DL (ref 31.5–36.5)
MCV RBC AUTO: 94 FL (ref 78–100)
PLATELET # BLD AUTO: 185 10E9/L (ref 150–450)
POTASSIUM SERPL-SCNC: 3.7 MMOL/L (ref 3.4–5.3)
RBC # BLD AUTO: 3.63 10E12/L (ref 4.4–5.9)
SODIUM SERPL-SCNC: 138 MMOL/L (ref 133–144)
WBC # BLD AUTO: 4 10E9/L (ref 4–11)

## 2021-03-13 PROCEDURE — 36415 COLL VENOUS BLD VENIPUNCTURE: CPT | Performed by: STUDENT IN AN ORGANIZED HEALTH CARE EDUCATION/TRAINING PROGRAM

## 2021-03-13 PROCEDURE — 999N001017 HC STATISTIC GLUCOSE BY METER IP

## 2021-03-13 PROCEDURE — 85610 PROTHROMBIN TIME: CPT | Performed by: STUDENT IN AN ORGANIZED HEALTH CARE EDUCATION/TRAINING PROGRAM

## 2021-03-13 PROCEDURE — 250N000013 HC RX MED GY IP 250 OP 250 PS 637: Performed by: STUDENT IN AN ORGANIZED HEALTH CARE EDUCATION/TRAINING PROGRAM

## 2021-03-13 PROCEDURE — 83735 ASSAY OF MAGNESIUM: CPT | Performed by: STUDENT IN AN ORGANIZED HEALTH CARE EDUCATION/TRAINING PROGRAM

## 2021-03-13 PROCEDURE — 250N000013 HC RX MED GY IP 250 OP 250 PS 637: Performed by: INTERNAL MEDICINE

## 2021-03-13 PROCEDURE — 214N000001 HC R&B CCU UMMC

## 2021-03-13 PROCEDURE — 250N000013 HC RX MED GY IP 250 OP 250 PS 637: Performed by: NURSE PRACTITIONER

## 2021-03-13 PROCEDURE — 93750 INTERROGATION VAD IN PERSON: CPT | Performed by: NURSE PRACTITIONER

## 2021-03-13 PROCEDURE — 250N000009 HC RX 250: Performed by: NURSE PRACTITIONER

## 2021-03-13 PROCEDURE — 85027 COMPLETE CBC AUTOMATED: CPT | Performed by: STUDENT IN AN ORGANIZED HEALTH CARE EDUCATION/TRAINING PROGRAM

## 2021-03-13 PROCEDURE — 80048 BASIC METABOLIC PNL TOTAL CA: CPT | Performed by: STUDENT IN AN ORGANIZED HEALTH CARE EDUCATION/TRAINING PROGRAM

## 2021-03-13 PROCEDURE — 99232 SBSQ HOSP IP/OBS MODERATE 35: CPT | Mod: 24 | Performed by: NURSE PRACTITIONER

## 2021-03-13 PROCEDURE — 250N000013 HC RX MED GY IP 250 OP 250 PS 637: Performed by: PHYSICIAN ASSISTANT

## 2021-03-13 RX ORDER — LIDOCAINE 4 G/G
2 PATCH TOPICAL
Status: DISCONTINUED | OUTPATIENT
Start: 2021-03-13 | End: 2021-03-25

## 2021-03-13 RX ORDER — METOLAZONE 2.5 MG/1
2.5 TABLET ORAL ONCE
Status: COMPLETED | OUTPATIENT
Start: 2021-03-13 | End: 2021-03-13

## 2021-03-13 RX ORDER — BUMETANIDE 2 MG/1
4 TABLET ORAL
Status: DISCONTINUED | OUTPATIENT
Start: 2021-03-13 | End: 2021-03-15

## 2021-03-13 RX ORDER — POTASSIUM CHLORIDE 750 MG/1
40 TABLET, EXTENDED RELEASE ORAL ONCE
Status: COMPLETED | OUTPATIENT
Start: 2021-03-13 | End: 2021-03-13

## 2021-03-13 RX ADMIN — HYDRALAZINE HYDROCHLORIDE 50 MG: 50 TABLET ORAL at 20:10

## 2021-03-13 RX ADMIN — MONTELUKAST 10 MG: 10 TABLET, FILM COATED ORAL at 22:12

## 2021-03-13 RX ADMIN — DOCUSATE SODIUM 50 MG AND SENNOSIDES 8.6 MG 2 TABLET: 8.6; 5 TABLET, FILM COATED ORAL at 07:59

## 2021-03-13 RX ADMIN — ASPIRIN 81 MG CHEWABLE TABLET 81 MG: 81 TABLET CHEWABLE at 08:02

## 2021-03-13 RX ADMIN — Medication 10 MG: at 22:13

## 2021-03-13 RX ADMIN — DOCUSATE SODIUM 50 MG AND SENNOSIDES 8.6 MG 1 TABLET: 8.6; 5 TABLET, FILM COATED ORAL at 20:10

## 2021-03-13 RX ADMIN — ACETAMINOPHEN 650 MG: 325 TABLET, FILM COATED ORAL at 06:04

## 2021-03-13 RX ADMIN — ISOSORBIDE DINITRATE 10 MG: 10 TABLET ORAL at 08:02

## 2021-03-13 RX ADMIN — POTASSIUM CHLORIDE 40 MEQ: 750 TABLET, EXTENDED RELEASE ORAL at 14:46

## 2021-03-13 RX ADMIN — ISOSORBIDE DINITRATE 10 MG: 10 TABLET ORAL at 14:08

## 2021-03-13 RX ADMIN — ISOSORBIDE DINITRATE 10 MG: 10 TABLET ORAL at 20:10

## 2021-03-13 RX ADMIN — BUMETANIDE 4 MG: 2 TABLET ORAL at 16:13

## 2021-03-13 RX ADMIN — GABAPENTIN 300 MG: 300 CAPSULE ORAL at 08:00

## 2021-03-13 RX ADMIN — BUMETANIDE 4 MG: 2 TABLET ORAL at 08:00

## 2021-03-13 RX ADMIN — LIDOCAINE 2 PATCH: 560 PATCH PERCUTANEOUS; TOPICAL; TRANSDERMAL at 18:19

## 2021-03-13 RX ADMIN — GABAPENTIN 600 MG: 300 CAPSULE ORAL at 22:13

## 2021-03-13 RX ADMIN — METOLAZONE 2.5 MG: 2.5 TABLET ORAL at 14:46

## 2021-03-13 RX ADMIN — POTASSIUM CHLORIDE 60 MEQ: 750 TABLET, EXTENDED RELEASE ORAL at 08:04

## 2021-03-13 RX ADMIN — WARFARIN SODIUM 12.5 MG: 10 TABLET ORAL at 18:18

## 2021-03-13 RX ADMIN — HYDRALAZINE HYDROCHLORIDE 50 MG: 50 TABLET ORAL at 08:01

## 2021-03-13 RX ADMIN — BUPROPION HYDROCHLORIDE 150 MG: 150 TABLET, EXTENDED RELEASE ORAL at 08:03

## 2021-03-13 RX ADMIN — ALLOPURINOL 300 MG: 300 TABLET ORAL at 08:03

## 2021-03-13 RX ADMIN — UMECLIDINIUM 1 PUFF: 62.5 AEROSOL, POWDER ORAL at 07:57

## 2021-03-13 RX ADMIN — GABAPENTIN 600 MG: 300 CAPSULE ORAL at 14:05

## 2021-03-13 RX ADMIN — FLUTICASONE FUROATE AND VILANTEROL TRIFENATATE 1 PUFF: 100; 25 POWDER RESPIRATORY (INHALATION) at 07:58

## 2021-03-13 RX ADMIN — HYDRALAZINE HYDROCHLORIDE 50 MG: 50 TABLET ORAL at 14:08

## 2021-03-13 RX ADMIN — AMIODARONE HYDROCHLORIDE 200 MG: 200 TABLET ORAL at 08:03

## 2021-03-13 RX ADMIN — ANAKINRA 100 MG: 100 INJECTION, SOLUTION SUBCUTANEOUS at 10:28

## 2021-03-13 RX ADMIN — CETIRIZINE HYDROCHLORIDE 10 MG: 10 TABLET, FILM COATED ORAL at 08:02

## 2021-03-13 ASSESSMENT — ACTIVITIES OF DAILY LIVING (ADL)
ADLS_ACUITY_SCORE: 10
ADLS_ACUITY_SCORE: 12
ADLS_ACUITY_SCORE: 10
ADLS_ACUITY_SCORE: 12
ADLS_ACUITY_SCORE: 10
ADLS_ACUITY_SCORE: 12

## 2021-03-13 ASSESSMENT — MIFFLIN-ST. JEOR: SCORE: 1781.47

## 2021-03-13 NOTE — PLAN OF CARE
D: Pt with h/y of NICM, EF 20 %, VT ,CRT-D,HM2 LVAD 6/19/17, CKD 3, COPD. Afib with DCCV.   Bilateral carpal tunnel syndrome, was on steroid injection 11/2020.  S/p bilateral release 2/18/21. Admitted for worsening functional status and renal function concerning for worsening heart failure per NP note. Acute gout flare ( L heal ) on Anakirnra. On status 2E for transplant.    I: Monitored vitals and assessed pt status.   Do not disturb active orders from  [id number].      PRN: Melatonin    A: A0x4. VSS, on RA during the day,on home CPAP overnight. MAP 77-78; SR 1 D AVB. Afebrile. Reported shoulder and low back pain,Tylenol given,hot pack applied with intermittent effect.  AUO. LVAD Dressings CDI. Blood sugars 158 at bedtime. Refused from BS check at 2 a.m, prefers with the lab in a.m. LVAD parameters stable,  Flow 7.1-6.7, no alarms overnight.     Temp:  [97.4  F (36.3  C)-98  F (36.7  C)] 97.4  F (36.3  C)  Pulse:  [68-87] 72  Resp:  [16-18] 18  BP: (76-92)/(58-83) 84/66  SpO2:  [96 %-99 %] 98 %      P: Continue to monitor Pt status and report changes to treatment team.

## 2021-03-13 NOTE — PLAN OF CARE
D: Pt is a 65 year old with a history of NICM (EF 20%), VT treated with CRT-D, afib treated with DCCV, and CKD3. Pt has Heartmate 2 LVAD.  Pt presented for bilateral carpal tunnel surgery on 2/8 and has been inpatient waiting on a heart transplant.    I: Monitored vitals and assessed pt status. Supported pt comfort and preferred activities. Heat packs and meds per MAR provided to manage joint pain. Pt reports persisting pain (in shoulders, hips, and feet) with some relief. Pt has +1 dependent edema and weight gain over the past day. Assisted pt in monitoring intake and elevating extremities. Gave diuretic meds per MAR.    A: Alert and oriented x 4. Pt has LVAD and CRT-D in place and functioning as expected. Pt in sinus rhythm with first degree AV block and BBB.  Pt has been afebrile this shift.     Temp range: 97.6 to 98.3F  Pulse: 62 to 77 beats per minute  BP: systolic 76 to 96mmHg, diastolic 64 to 86mmHg  Respiratory: rate has ranged 17 to 20 breaths per minute, breath sounds clear all lobes, oxygen sats have ranged from 96 to 100%, pt is on room air and uses CPAP while sleeping    Diet: Pt is on 3g sodium diet. Blood glucose is being monitored per orders.  Mobility: Pt is independent and steady on feet.  I/O: Input = ~2.1L, Output = ~4L; pt voiding spontaneously into bedside urinal. BM this shift 3/13/2021. Ambulation promoted, and pt given stool management meds per MAR.    P: Continue with plan of care. Pt verbalizes acceptance with plan.     Bre Hernandez RN

## 2021-03-13 NOTE — PLAN OF CARE
Addendum to note by Bre Hernandez RN:  Patient has a HeartMate 2 LVAD, running at 9600 rpm, no alarms during shift.  Flow 5.6-7.2, PI 3.4-5.0.  MD team notified of elevated flow (>7.0), no intervention at this time.  Drive line site clean and dry, no irritation or injury noted, dressing changed.

## 2021-03-13 NOTE — PROGRESS NOTES
[facility]   Cardiology II Service / Advanced Heart Failure  Daily Progress Note  Date of Service: 3/9/2021      Patient: Jim Willingham  MRN: [id number]  Admission Date: 2/8/2021  Hospital Day # 33    Assessment and Plan:  Jim Willingham is a 65 year old male with history of NICM EF 20% c/b VT s/p CRT-D s/p HMII LVAD 6/19/2017 originally intended as destination therapy 2/2 obesity however with recent weight loss now candidate for transplantation. He is admitted for worsening functional status and renal function concerning for worsening heart failure. He is now listed status 2E for transplant, blood group O. Note: exception is due 3/24.    Today's Plan:  - OTD Metolazone 2.5 mg  - Continue Bumex 4 mg BID  - Reassess fluid status in AM    # Chronic systolic heart failure secondary to NCM (LVEF < 30% per TTE 1/2021)  # S/p HMII LVAD (6/2017)  #Listed status 2E for transplant due to multiple LVAD complications  Stage D, NYHA Class III. TTE 1/8/21 at 9400rpm, EF<30%m LVIDd 6.8cm, at least mildly reduced RV function, AoV closed without AI, mild-mod eccentric MR, dilated IVC without collapse. RHC 2/23 RA 7 PA 26/8(15) PCWP 6 Kee C/CO 4.6/2.2 Td CO/CI 5.5/2.6     - Fluid status: Slightly hypervolemic, bumex 4mg po bid, OTD Metolazone as above.  - ACEi/ARB/ARNi: deferred due to renal function  - Afterload reduction: hydralazine 50mg TID and isordil 10mg TID  - BB: contraindicated due to low cardiac output  - Aldosterone antagonist:  Deferred due to renal function  - SCD ppx:  CRT-d  - Antiplatelet: aspirin 81mg daily  - Anticoagulation: warfarin, dosed per pharmacy, goal INR 2.5-3.  INR today 2.32, defer bridging  - MAPs: 70-80s  - LDH:  248 (3/7)    # VT  # AFib  Currently in SR  - continue amiodarone 200mg daily  - BB deferred as above  - warfarin as above    # WALTER on CKD III, resolved  Cr 2s on admission, improved and stabilized ~1.5.   - continue diuretic as above  - daily BMP for now    # DMII  # Low  cortisol levels  # Concern for hypoglycemia, resolved  # H/o bariatric surgery [facility] En Y  HgbA1c 1/2021 6.2%.  Has been on prednisone for management of carpal tunnel syndrome for about 7 years, tapered down from 9/2020 and ultimately stopped 2/2021.  Recent symptomatic hypoglycemia (BGs 50s on 3/4), so endocrine was consulted.  C-peptide was 13.9, proinsulin was 18.4 (3/4, he was normoglycemic with post-prandial glucose 84 at that time).  He has not had any further hypoglycemia, bur endocrine sought to r/o adrenal insufficiency. Serum cortisol levels have been low, so he underwent a cosyntropin stimulation test today.  - Endocrine consulted, signed off  - Cosyntropin stimulation test normal, ruling out adrenal insufficiency  - Continue to monitor BGs  - Per endo --> Fingerstick glucose readings might not be accurate for assessment of hypoglycemia, if the patient has another glucose reading <55 (without insulin therapy), plasma glucose should be sent for confirmation of hypoglycemia prior to correction.  While the patient is off insulin therapy, POC glucose readings above 60 are acceptable for the patient    # Acute gout flare  # Hx polyarticular gout flares  Pain in L heal more acute starting 3/9, similar to previous gout attacks per pt. Anakinra x 5 days with resolution in February. Per Rheumatology: Could consider re-dosing Anakinra if he develops a subsequent flare during hospital stay.   Started Anakinra again 3/9, pt reporting improvement in pain and weightbearing status of L heel. Uric acid was normal yesterday.   - continue allopurinol, uric acid wnl   - rheum consulted given recurrence  - subcutaneus Anakinra 100 mg daily x5 days (last dose Don 3/14), can do daily for ppx for rheum    # Upper respiratory congestion/possible URI, improved  # Rhinorrhea, improved  Afebrile, WBC's normal. Feels like it could be allergies per pt report, but also has history of bacterial URI's in the past. Completed 5 day  course of Cefdinir last month. COVID negative 3/7. Resp PCR + rhinovirus 2/12, CXR 2/12 with no overt PNA. Stopped cefdinir 2/15. Repeat RVP-3/5 per transplant ID, negative.  Reporting decrease in congestion and rhinorrhea since change from Claritin to Zyrtec.  - daily CBC  - continue Zyrtec 10 mg daily  - monitor symptoms    # Bilateral shoulder pain: Hx of osteoarthritis/ Per pt has had recommendation in the past for shoulder arthroscopy. Increased discomfort started 3/12.  - Bilateral 4% lidocaine patches to shoulders 12 h daily  - Encourage offloading shoulders -- OOB activity or laying supine.     OTHER:  # Carpel tunnel, bilateral s/p bilateral release: Evidence of thenar atrophy, s/p steroid injection 11/2020.  S/p bilateral release 2/18/21. Appreciate ortho/plastics consults, signed off. Continue gabapentin 300mg/600mg/600mg and tylenol as needed.  # COPD/asthma:  Continue PTA breo and albuterol as needed.  # Rhinovirus, resolved:  Repeat respiratory virus panel was negative.  # Depression:  Continue PTA buproprion.  # Hx Staph Hominis Bacteremia:  No need for surveillance blood cx per transplant ID.      FEN: 3g Na diet  PROPHY:  Ambulate, warfarin  LINES:  PIV  DISPO:  Pending, currently listed for heart transplantation  CODE STATUS:  full    =============================================================    Interval History/ROS:   No acute overnight events. He had a vivid dream this morning that he had received a donor heart, awoke and felt down as this is not the case at this time. Feeling fatigued today and wishes to spend time catching up on sleep. Otherwise feels okay. No trouble breathing when laying flat, however abdomen does feel a little tight today. BLE trace edema is stable. Reports his L heel is less painful today, no problems with bearing weight on it today. Also reports congestion and rhinorrhea improved since starting Zyrtec.     Last 24 hr care team notes reviewed.   ROS: 4 point ROS  "including Respiratory, CV, GI and , other than that noted in the HPI, is negative.     Medications: Reviewed in EPIC.     Physical Exam:   BP 96/86 (BP Location: Left arm)   Pulse 62   Temp 97.6  F (36.4  C) (Oral)   Resp 20   Ht 1.727 m (5' 8\")   Wt 101.2 kg (223 lb 1.6 oz)   SpO2 100%   BMI 33.92 kg/m       GENERAL: Appears comfortable, in no distress.  HEENT: Eye symmetrical, no discharge or icterus bilaterally. Mucous membranes moist and without lesions.  NECK: Supple, JVD not visible at 45 degrees.   CV: +mechanical LVAD hum.  RESPIRATORY: Respirations regular, even, and unlabored. Lungs CTA throughout.    GI: Moderately distended with normoactive bowel sounds present in all quadrants. No tenderness, rebound, guarding.   EXTREMITIES: Trace BLE peripheral edema. Non pulsatile.   NEUROLOGIC: Alert and oriented x 3. No focal deficits.   MUSCULOSKELETAL: No joint swelling or tenderness.   SKIN: No jaundice. No rashes or lesions.     Data:  CMP  Recent Labs   Lab 03/13/21  0551 03/12/21  0605 03/11/21  0525 03/10/21  0541 03/08/21  0730 03/08/21  0730    138 138 140   < > 137   POTASSIUM 3.7 3.8 3.9 4.0   < > 4.0   CHLORIDE 105 105 104 105   < > 105   CO2 26 27 26 28   < > 27   ANIONGAP 8 5 8 6   < > 5   GLC 90 98 103* 94   < > 124*   BUN 40* 34* 35* 37*   < > 36*   CR 1.52* 1.44* 1.47* 1.57*   < > 1.49*   GFRESTIMATED 48* 51* 50* 46*   < > 49*   GFRESTBLACK 55* 59* 58* 53*   < > 57*   QAMAR 8.4* 8.6 8.9 8.7   < > 8.8   MAG 2.6* 2.5* 2.4* 2.5*   < > 2.4*   PROTTOTAL  --   --   --   --   --  6.7*   ALBUMIN  --   --   --   --   --  3.5   BILITOTAL  --   --   --   --   --  0.6   ALKPHOS  --   --   --   --   --  109   AST  --   --   --   --   --  26   ALT  --   --   --   --   --  32    < > = values in this interval not displayed.     CBC  Recent Labs   Lab 03/13/21  0551 03/11/21  0525 03/09/21  0518 03/07/21  0720   WBC 4.0 4.2 4.7 4.7   RBC 3.63* 3.88* 3.71* 3.70*   HGB 10.4* 11.3* 10.4* 10.7*   HCT 34.0* " 36.4* 34.6* 35.0*   MCV 94 94 93 95   MCH 28.7 29.1 28.0 28.9   MCHC 30.6* 31.0* 30.1* 30.6*   RDW 18.2* 18.4* 18.9* 19.3*    203 206 215     INR  Recent Labs   Lab 03/13/21  0551 03/12/21  0605 03/11/21  0525 03/10/21  0616   INR 2.38* 2.28* 2.22* 2.39*     Time/Communication  I personally spent a total of 25 minutes. Of that 15 minutes was counseling/coordination of patient's care. Plan of care discussed with patient. See my note above for details.    Patient discussed with Dr. Mccarty.      Belkys Johansen, RN-BSN, DNP-Student

## 2021-03-13 NOTE — PLAN OF CARE
Patient post-carpal tunnel surgery, now waiting status 2E for heart transplant.  PMH of NICM with EF 20%, CKD3, COPD, VT with CRT-D, atrial fibrillation treated with DCCV, and HeartMate 2 placement.     Neuro: A&O x4, denied dizziness.  Reported baseline numbness in extremities, stated tingling in hands has decreased since carpal tunnel surgery.  Respiratory:  Lung sounds clear to auscultation, denied orthopnea and RESENDIZ.  Cardiac: LVAD hum noted.  Had 1+ edema in legs and feet, was given po bumetanide.  HeartMate 2 LVAD running at a fixed rate of 9600 rpm, no alarms during shift.  Flow 5.9-7.4, PI 3.9-5.5.  Drive line site clean and dry, no irritation or injury noted.    GI: Denied nausea, bowel sounds normoactive.  No BM today, but is passing flatus.  : No issues noted, had good urine output, see I&O flowsheet.  Skin: Surgical sites healing well, see PCS for assessment and treatment of wounds and surgical incisions.   VS: In sinus rhythm with first degree AV block, BBB, and rare to occasional PVCs.  HR 70s-80s, SBP 70s-80s, SaO2 96-98% on room air.    Drips:  None.    Electrolytes: scheduled potassium given per orders.  Pain: Reported generalized joint pain that was moderately improved after prn acetaminophen, reported decrease in pain after use of heating pad.  Started lidocaine patch over right shoulder.  Took scheduled anakinra for gout flare in left heel. Patient reported decrease in pain.  Tests/procedures: None.    Mobility: Ambulated in hallway independently, was steady on his feet.  Social: Wife (Cate) visited during shift.    Plan:  Continue to monitor pain, VS, heart rhythm, LVAD function, drive line site integrity, surgical site integrity, fluid status, bowel status, cardiac and respiratory status.  Notify care team of changes in patient condition or other concerns.  Awaiting heart transplant.

## 2021-03-14 LAB
ANION GAP SERPL CALCULATED.3IONS-SCNC: 6 MMOL/L (ref 3–14)
BUN SERPL-MCNC: 42 MG/DL (ref 7–30)
CALCIUM SERPL-MCNC: 8.6 MG/DL (ref 8.5–10.1)
CHLORIDE SERPL-SCNC: 103 MMOL/L (ref 94–109)
CO2 SERPL-SCNC: 28 MMOL/L (ref 20–32)
CREAT SERPL-MCNC: 1.66 MG/DL (ref 0.66–1.25)
GFR SERPL CREATININE-BSD FRML MDRD: 43 ML/MIN/{1.73_M2}
GLUCOSE BLDC GLUCOMTR-MCNC: 110 MG/DL (ref 70–99)
GLUCOSE BLDC GLUCOMTR-MCNC: 173 MG/DL (ref 70–99)
GLUCOSE SERPL-MCNC: 101 MG/DL (ref 70–99)
INR PPP: 2.89 (ref 0.86–1.14)
MAGNESIUM SERPL-MCNC: 2.6 MG/DL (ref 1.6–2.3)
POTASSIUM SERPL-SCNC: 3.6 MMOL/L (ref 3.4–5.3)
SODIUM SERPL-SCNC: 137 MMOL/L (ref 133–144)

## 2021-03-14 PROCEDURE — 250N000013 HC RX MED GY IP 250 OP 250 PS 637: Performed by: NURSE PRACTITIONER

## 2021-03-14 PROCEDURE — 93750 INTERROGATION VAD IN PERSON: CPT | Performed by: NURSE PRACTITIONER

## 2021-03-14 PROCEDURE — 250N000013 HC RX MED GY IP 250 OP 250 PS 637: Performed by: PHYSICIAN ASSISTANT

## 2021-03-14 PROCEDURE — 250N000013 HC RX MED GY IP 250 OP 250 PS 637: Performed by: INTERNAL MEDICINE

## 2021-03-14 PROCEDURE — 36415 COLL VENOUS BLD VENIPUNCTURE: CPT | Performed by: STUDENT IN AN ORGANIZED HEALTH CARE EDUCATION/TRAINING PROGRAM

## 2021-03-14 PROCEDURE — 85610 PROTHROMBIN TIME: CPT | Performed by: STUDENT IN AN ORGANIZED HEALTH CARE EDUCATION/TRAINING PROGRAM

## 2021-03-14 PROCEDURE — 250N000013 HC RX MED GY IP 250 OP 250 PS 637: Performed by: STUDENT IN AN ORGANIZED HEALTH CARE EDUCATION/TRAINING PROGRAM

## 2021-03-14 PROCEDURE — 83735 ASSAY OF MAGNESIUM: CPT | Performed by: STUDENT IN AN ORGANIZED HEALTH CARE EDUCATION/TRAINING PROGRAM

## 2021-03-14 PROCEDURE — 99232 SBSQ HOSP IP/OBS MODERATE 35: CPT | Mod: 24 | Performed by: NURSE PRACTITIONER

## 2021-03-14 PROCEDURE — 214N000001 HC R&B CCU UMMC

## 2021-03-14 PROCEDURE — 999N001017 HC STATISTIC GLUCOSE BY METER IP

## 2021-03-14 PROCEDURE — 80048 BASIC METABOLIC PNL TOTAL CA: CPT | Performed by: STUDENT IN AN ORGANIZED HEALTH CARE EDUCATION/TRAINING PROGRAM

## 2021-03-14 RX ADMIN — BUPROPION HYDROCHLORIDE 150 MG: 150 TABLET, EXTENDED RELEASE ORAL at 09:11

## 2021-03-14 RX ADMIN — ISOSORBIDE DINITRATE 10 MG: 10 TABLET ORAL at 21:12

## 2021-03-14 RX ADMIN — UMECLIDINIUM 1 PUFF: 62.5 AEROSOL, POWDER ORAL at 09:09

## 2021-03-14 RX ADMIN — ASPIRIN 81 MG CHEWABLE TABLET 81 MG: 81 TABLET CHEWABLE at 09:11

## 2021-03-14 RX ADMIN — FLUTICASONE FUROATE AND VILANTEROL TRIFENATATE 1 PUFF: 100; 25 POWDER RESPIRATORY (INHALATION) at 09:09

## 2021-03-14 RX ADMIN — BUMETANIDE 4 MG: 2 TABLET ORAL at 16:55

## 2021-03-14 RX ADMIN — POTASSIUM CHLORIDE 60 MEQ: 750 TABLET, EXTENDED RELEASE ORAL at 09:12

## 2021-03-14 RX ADMIN — HYDRALAZINE HYDROCHLORIDE 50 MG: 50 TABLET ORAL at 09:12

## 2021-03-14 RX ADMIN — ISOSORBIDE DINITRATE 10 MG: 10 TABLET ORAL at 09:12

## 2021-03-14 RX ADMIN — DOCUSATE SODIUM 50 MG AND SENNOSIDES 8.6 MG 1 TABLET: 8.6; 5 TABLET, FILM COATED ORAL at 21:12

## 2021-03-14 RX ADMIN — ACETAMINOPHEN 650 MG: 325 TABLET, FILM COATED ORAL at 13:14

## 2021-03-14 RX ADMIN — GABAPENTIN 600 MG: 300 CAPSULE ORAL at 14:13

## 2021-03-14 RX ADMIN — LIDOCAINE 2 PATCH: 560 PATCH PERCUTANEOUS; TOPICAL; TRANSDERMAL at 21:13

## 2021-03-14 RX ADMIN — Medication 10 MG: at 21:12

## 2021-03-14 RX ADMIN — HYDRALAZINE HYDROCHLORIDE 50 MG: 50 TABLET ORAL at 14:12

## 2021-03-14 RX ADMIN — CETIRIZINE HYDROCHLORIDE 10 MG: 10 TABLET, FILM COATED ORAL at 09:11

## 2021-03-14 RX ADMIN — DOCUSATE SODIUM 50 MG AND SENNOSIDES 8.6 MG 1 TABLET: 8.6; 5 TABLET, FILM COATED ORAL at 09:24

## 2021-03-14 RX ADMIN — ACETAMINOPHEN 650 MG: 325 TABLET, FILM COATED ORAL at 21:12

## 2021-03-14 RX ADMIN — ALLOPURINOL 300 MG: 300 TABLET ORAL at 09:11

## 2021-03-14 RX ADMIN — GABAPENTIN 600 MG: 300 CAPSULE ORAL at 21:12

## 2021-03-14 RX ADMIN — GABAPENTIN 300 MG: 300 CAPSULE ORAL at 09:10

## 2021-03-14 RX ADMIN — HYDRALAZINE HYDROCHLORIDE 50 MG: 50 TABLET ORAL at 21:12

## 2021-03-14 RX ADMIN — WARFARIN SODIUM 9 MG: 5 TABLET ORAL at 18:02

## 2021-03-14 RX ADMIN — AMIODARONE HYDROCHLORIDE 200 MG: 200 TABLET ORAL at 09:10

## 2021-03-14 RX ADMIN — MONTELUKAST 10 MG: 10 TABLET, FILM COATED ORAL at 21:12

## 2021-03-14 RX ADMIN — ISOSORBIDE DINITRATE 10 MG: 10 TABLET ORAL at 14:13

## 2021-03-14 RX ADMIN — BUMETANIDE 4 MG: 2 TABLET ORAL at 09:12

## 2021-03-14 ASSESSMENT — ACTIVITIES OF DAILY LIVING (ADL)
ADLS_ACUITY_SCORE: 12

## 2021-03-14 ASSESSMENT — MIFFLIN-ST. JEOR: SCORE: 1765.5

## 2021-03-14 NOTE — PROGRESS NOTES
[facility]   Cardiology II Service / Advanced Heart Failure  Daily Progress Note  Date of Service: 3/9/2021      Patient: Jim Willingham  MRN: [id number]  Admission Date: 2/8/2021  Hospital Day # 34    Assessment and Plan:  Jim Willingham is a 65 year old male with history of NICM EF 20% c/b VT s/p CRT-D s/p HMII LVAD 6/19/2017 originally intended as destination therapy 2/2 obesity however with recent weight loss now candidate for transplantation. He is admitted for worsening functional status and renal function concerning for worsening heart failure. He is now listed status 2E for transplant, blood group O. Note: exception is due 3/24.    Today's Plan:  -no changes  -monitor joint pain, defer daily anakinra today    # Chronic systolic heart failure secondary to NCM (LVEF < 30% per TTE 1/2021)  # S/p HMII LVAD (6/2017)  # Listed status 2E for transplant due to multiple LVAD complications  Stage D, NYHA Class III. TTE 1/8/21 at 9400rpm, EF<30%m LVIDd 6.8cm, at least mildly reduced RV function, AoV closed without AI, mild-mod eccentric MR, dilated IVC without collapse. RHC 2/23 RA 7 PA 26/8(15) PCWP 6 Kee C/CO 4.6/2.2 Td CO/CI 5.5/2.6     - Fluid status: euvolemic, bumex 4mg po bid, received metolazone 2.5 mg once yesterday   - ACEi/ARB/ARNi: deferred due to renal function  - Afterload reduction: hydralazine 50mg TID and isordil 10mg TID  - BB: contraindicated due to low cardiac output  - Aldosterone antagonist:  Deferred due to renal function  - SCD ppx:  CRT-d  - Antiplatelet: aspirin 81mg daily  - Anticoagulation: warfarin, dosed per pharmacy, goal INR 2.5-3.  INR today 2.89, defer bridging  - MAPs: 70-80s  - LDH:  248 (3/7)    # VT  # AFib  Currently in SR  - continue amiodarone 200mg daily  - BB deferred as above  - warfarin as above    # WALTER on CKD III, resolved  Cr 2s on admission, improved and stabilized ~1.5.   - continue diuretic as above  - daily BMP for now    # DMII  # Low cortisol  levels  # Concern for hypoglycemia, resolved  # H/o bariatric surgery [facility] Y  HgbA1c 1/2021 6.2%.  Has been on prednisone for management of carpal tunnel syndrome for about 7 years, tapered down from 9/2020 and ultimately stopped 2/2021.  Recent symptomatic hypoglycemia (BGs 50s on 3/4), so endocrine was consulted.  C-peptide was 13.9, proinsulin was 18.4 (3/4, he was normoglycemic with post-prandial glucose 84 at that time).  He has not had any further hypoglycemia, bur endocrine sought to r/o adrenal insufficiency. Serum cortisol levels have been low, so he underwent a cosyntropin stimulation test today.  - Endocrine consulted, signed off  - Cosyntropin stimulation test normal, ruling out adrenal insufficiency  - BID and HS BS  - Per endo --> Fingerstick glucose readings might not be accurate for assessment of hypoglycemia, if the patient has another glucose reading <55 (without insulin therapy), plasma glucose should be sent for confirmation of hypoglycemia prior to correction.  While the patient is off insulin therapy, POC glucose readings above 60 are acceptable for the patient    # Acute gout flare  # Hx polyarticular gout flares  Pain in L heal more acute starting 3/9, similar to previous gout attacks per pt. Anakinra x 5 days with resolution in February. Per Rheumatology: Could consider re-dosing Anakinra if he develops a subsequent flare during hospital stay.   Started Anakinra again 3/9, pt reporting improvement in pain and weightbearing status of L heel. Uric acid was normal yesterday.   - continue allopurinol, uric acid wnl   - rheum consulted   - subcutaneus Anakinra 100 mg daily x5 days (last dose Don 3/14), can do daily for ppx per rheum if needed    # Upper respiratory congestion/possible URI, improved  # Rhinorrhea, improved  Afebrile, WBC's normal. Feels like it could be allergies per pt report, but also has history of bacterial URI's in the past. Completed 5 day course of Cefdinir last  "month. COVID negative 3/7. Resp PCR + rhinovirus 2/12, CXR 2/12 with no overt PNA. Stopped cefdinir 2/15. Repeat RVP-3/5 per transplant ID, negative. Reporting decrease in congestion and rhinorrhea since change from Claritin to Zyrtec.  - EOD CBC  - continue Zyrtec 10 mg daily  - monitor symptoms    # Bilateral shoulder pain: Hx of osteoarthritis/ Per pt has had recommendation in the past for shoulder arthroscopy. Increased discomfort started 3/12.  - Bilateral 4% lidocaine patches to shoulders 12 h daily  - Encourage offloading shoulders -- OOB activity or laying supine.     OTHER:  # Carpel tunnel, bilateral s/p bilateral release: Evidence of thenar atrophy, s/p steroid injection 11/2020.  S/p bilateral release 2/18/21. Appreciate ortho/plastics consults, signed off. Continue gabapentin 300mg/600mg/600mg and tylenol as needed.  # COPD/asthma:  Continue PTA breo and albuterol as needed.  # Rhinovirus, resolved:  Repeat respiratory virus panel was negative.  # Depression:  Continue PTA buproprion.  # Hx Staph Hominis Bacteremia:  No need for surveillance blood cx per transplant ID.      FEN: 3g Na diet  PROPHY:  Ambulate, warfarin  LINES:  PIV  DISPO:  Pending, currently listed for heart transplantation  CODE STATUS:  full    =============================================================    Interval History/ROS:   No acute overnight events. Feeling well. Heel pain is better. Lost a few lb overnight after metolazone. Denies orthopnea, PND, LE edema is mild. No new complaints.     Last 24 hr care team notes reviewed.   ROS: 4 point ROS including Respiratory, CV, GI and , other than that noted in the HPI, is negative.     Medications: Reviewed in EPIC.     Physical Exam:   BP (!) 83/76 (BP Location: Left arm)   Pulse 77   Temp 98.1  F (36.7  C) (Oral)   Resp 18   Ht 1.727 m (5' 8\")   Wt 99.6 kg (219 lb 9.3 oz)   SpO2 97%   BMI 33.39 kg/m       GENERAL: Appears comfortable, in no distress.  HEENT: Eye " symmetrical, no discharge or icterus bilaterally. Mucous membranes moist and without lesions.  NECK: Supple, JVD just above clavicle at 75 degrees.   CV: +mechanical LVAD hum.  RESPIRATORY: Respirations regular, even, and unlabored. Lungs CTA throughout.    GI: Moderately distended with normoactive bowel sounds present in all quadrants. No tenderness, rebound, guarding.   EXTREMITIES: Trace BLE peripheral edema. Non pulsatile.   NEUROLOGIC: Alert and oriented x 3. No focal deficits.   MUSCULOSKELETAL: No joint swelling or tenderness.   SKIN: No jaundice. No rashes or lesions.     Data:  CMP  Recent Labs   Lab 03/14/21  0610 03/13/21  0551 03/12/21  0605 03/11/21  0525 03/08/21  0730 03/08/21  0730    138 138 138   < > 137   POTASSIUM 3.6 3.7 3.8 3.9   < > 4.0   CHLORIDE 103 105 105 104   < > 105   CO2 28 26 27 26   < > 27   ANIONGAP 6 8 5 8   < > 5   * 90 98 103*   < > 124*   BUN 42* 40* 34* 35*   < > 36*   CR 1.66* 1.52* 1.44* 1.47*   < > 1.49*   GFRESTIMATED 43* 48* 51* 50*   < > 49*   GFRESTBLACK 50* 55* 59* 58*   < > 57*   QAMAR 8.6 8.4* 8.6 8.9   < > 8.8   MAG 2.6* 2.6* 2.5* 2.4*   < > 2.4*   PROTTOTAL  --   --   --   --   --  6.7*   ALBUMIN  --   --   --   --   --  3.5   BILITOTAL  --   --   --   --   --  0.6   ALKPHOS  --   --   --   --   --  109   AST  --   --   --   --   --  26   ALT  --   --   --   --   --  32    < > = values in this interval not displayed.     CBC  Recent Labs   Lab 03/13/21  0551 03/11/21  0525 03/09/21  0518 03/07/21  0720   WBC 4.0 4.2 4.7 4.7   RBC 3.63* 3.88* 3.71* 3.70*   HGB 10.4* 11.3* 10.4* 10.7*   HCT 34.0* 36.4* 34.6* 35.0*   MCV 94 94 93 95   MCH 28.7 29.1 28.0 28.9   MCHC 30.6* 31.0* 30.1* 30.6*   RDW 18.2* 18.4* 18.9* 19.3*    203 206 215     INR  Recent Labs   Lab 03/14/21  0610 03/13/21  0551 03/12/21  0605 03/11/21  0525   INR 2.89* 2.38* 2.28* 2.22*     Time/Communication  I personally spent a total of 25 minutes. Of that 15 minutes was  counseling/coordination of patient's care. Plan of care discussed with patient. See my note above for details.    Patient discussed with Dr. Mccarty.      Kiesha Wilder DNP, NP-C  Advanced Heart Failure/Cardiology 2 Service  Pager   3/14/2021

## 2021-03-14 NOTE — PLAN OF CARE
D: Pt is a 65 year old with a history of NICM (EF 20%), VT treated with CRT-D, afib treated with DCCV, and CKD3. Pt has Heartmate 2 LVAD.  Pt presented for bilateral carpal tunnel surgery on 2/8 and has been inpatient waiting on a heart transplant.     I: Monitored vitals and assessed pt status. Supported pt comfort and preferred activities. Heat pad and meds per MAR provided to manage joint pain. Pt reports persisting pain (in shoulders, hips, and back) with some relief. Pt has +1 dependent edema that appears improved since 3/13/21. Pt lost 4lbs since yesterday and is urinating spontaneously. Gave diuretic meds per MAR.     A: Alert and oriented x 4. Pt has LVAD and CRT-D in place and functioning as expected. Pt in sinus rhythm with first degree AV block and BBB.  Pt has been afebrile this shift.      Temp range: 98 to 98.1F  Pulse: 72 to 88 beats per minute  BP: systolic 80 to 85mmHg, diastolic 68 to 71mmHg  Respiratory: rate has been 18 breaths per minute, breath sounds clear all lobes, oxygen sats have ranged from 96 to 97%, pt is on room air and uses CPAP while sleeping     Diet: Pt is on 3g sodium diet. Blood glucose is being monitored per orders.  Mobility: Pt is independent and steady on feet. Pt spent significant time out of room walking today.  I/O: Input = ~2.3L, Output = ~1.8L; pt voiding into bedside urinal. Ambulation promoted, and pt given stool management meds per MAR.     P: Continue with plan of care. Pt verbalizes acceptance with plan.      Bre Hernandez RN

## 2021-03-14 NOTE — PROCEDURES
The patient's HeartMate LVAD was interrogated 3/13/2021  * Speed 5400 rpm   * Pulsatility index 3.5-4.9  * Power 6.2-7.2 Manuel   * Flow 5.3-7.1 L/minute   Fluid status: euvolemic   Alarms were reviewed, and notable for PI events per baseline.   The driveline exit site was inspected, dressing cdi.   All external components were inspected and showed no evidence of damage or malfunction, none replaced.   No changes to VAD settings made

## 2021-03-14 NOTE — PLAN OF CARE
Addendum to note by Bre Hernandez RN:  Patient has a HeartMate 2 LVAD running at 9600 rpm, no alarms during shift.  Flow 5.4-6.9, PI 2.9-4.8.  MD team aware of PI < 3.5, no interventions at this time.  Drive line dressing changed during shift, see PCS for assessment of site.

## 2021-03-14 NOTE — PROCEDURES
The patient's HeartMate LVAD was interrogated 3/13/2021  * Speed 5400 rpm   * Pulsatility index 2.9-5.1  * Power 6.5-7.2 Manuel   * Flow 6-7 L/minute   Fluid status: euvolemic   Alarms were reviewed, and notable for PI events per baseline.   The driveline exit site was inspected, dressing cdi.   All external components were inspected and showed no evidence of damage or malfunction, none replaced.   No changes to VAD settings made

## 2021-03-14 NOTE — PLAN OF CARE
D: Pt with h/y of NICM, EF 20 %, VT ,CRT-D,HM2 LVAD 6/19/17, CKD 3, COPD. Afib with DCCV.   Bilateral carpal tunnel syndrome, was on steroid injection 11/2020.  S/p bilateral release 2/18/21. Admitted for worsening functional status and renal function concerning for worsening heart failure per NP note. Acute gout flare ( L heal ) on Anakirnra. On status 2E for transplant.     I: Monitored vitals and assessed pt status.   Do not disturb active orders from  [id number]  PRN: melatonin    A: A0x4. VSS, on RA. On CPAP overnight. Afebrile. MAP 79-93. Shoulders pain deacresed with lidocaine patches. AUO. LVAD Dressings CDI.  Blood sugars 162 at bedtime. LVAD #s stable, flow 6.2-5.5,no alarms overnight.       Temp:  [97.5  F (36.4  C)-98.3  F (36.8  C)] 98.1  F (36.7  C)  Pulse:  [62-93] 80  Resp:  [17-20] 18  BP: ()/(64-91) 83/74  SpO2:  [96 %-100 %] 98 %      P: Continue to monitor Pt status and report changes to treatment team.

## 2021-03-15 LAB
ALBUMIN SERPL-MCNC: 3.4 G/DL (ref 3.4–5)
ALP SERPL-CCNC: 102 U/L (ref 40–150)
ALT SERPL W P-5'-P-CCNC: 37 U/L (ref 0–70)
ANION GAP SERPL CALCULATED.3IONS-SCNC: 9 MMOL/L (ref 3–14)
AST SERPL W P-5'-P-CCNC: 35 U/L (ref 0–45)
BILIRUB DIRECT SERPL-MCNC: 0.1 MG/DL (ref 0–0.2)
BILIRUB SERPL-MCNC: 0.6 MG/DL (ref 0.2–1.3)
BUN SERPL-MCNC: 47 MG/DL (ref 7–30)
CALCIUM SERPL-MCNC: 8.9 MG/DL (ref 8.5–10.1)
CHLORIDE SERPL-SCNC: 98 MMOL/L (ref 94–109)
CO2 SERPL-SCNC: 29 MMOL/L (ref 20–32)
CREAT SERPL-MCNC: 1.67 MG/DL (ref 0.66–1.25)
ERYTHROCYTE [DISTWIDTH] IN BLOOD BY AUTOMATED COUNT: 17.8 % (ref 10–15)
GFR SERPL CREATININE-BSD FRML MDRD: 43 ML/MIN/{1.73_M2}
GLUCOSE BLDC GLUCOMTR-MCNC: 323 MG/DL (ref 70–99)
GLUCOSE BLDC GLUCOMTR-MCNC: 94 MG/DL (ref 70–99)
GLUCOSE SERPL-MCNC: 102 MG/DL (ref 70–99)
HCT VFR BLD AUTO: 37 % (ref 40–53)
HGB BLD-MCNC: 11.6 G/DL (ref 13.3–17.7)
INR PPP: 2.76 (ref 0.86–1.14)
MAGNESIUM SERPL-MCNC: 2.5 MG/DL (ref 1.6–2.3)
MCH RBC QN AUTO: 29.4 PG (ref 26.5–33)
MCHC RBC AUTO-ENTMCNC: 31.4 G/DL (ref 31.5–36.5)
MCV RBC AUTO: 94 FL (ref 78–100)
PLATELET # BLD AUTO: 196 10E9/L (ref 150–450)
POTASSIUM SERPL-SCNC: 3.3 MMOL/L (ref 3.4–5.3)
PROT SERPL-MCNC: 6.5 G/DL (ref 6.8–8.8)
RBC # BLD AUTO: 3.94 10E12/L (ref 4.4–5.9)
SODIUM SERPL-SCNC: 135 MMOL/L (ref 133–144)
WBC # BLD AUTO: 4.4 10E9/L (ref 4–11)

## 2021-03-15 PROCEDURE — 80076 HEPATIC FUNCTION PANEL: CPT | Performed by: NURSE PRACTITIONER

## 2021-03-15 PROCEDURE — 80048 BASIC METABOLIC PNL TOTAL CA: CPT | Performed by: STUDENT IN AN ORGANIZED HEALTH CARE EDUCATION/TRAINING PROGRAM

## 2021-03-15 PROCEDURE — 85027 COMPLETE CBC AUTOMATED: CPT | Performed by: STUDENT IN AN ORGANIZED HEALTH CARE EDUCATION/TRAINING PROGRAM

## 2021-03-15 PROCEDURE — 250N000013 HC RX MED GY IP 250 OP 250 PS 637: Performed by: NURSE PRACTITIONER

## 2021-03-15 PROCEDURE — 99232 SBSQ HOSP IP/OBS MODERATE 35: CPT | Mod: 24 | Performed by: NURSE PRACTITIONER

## 2021-03-15 PROCEDURE — 250N000013 HC RX MED GY IP 250 OP 250 PS 637: Performed by: STUDENT IN AN ORGANIZED HEALTH CARE EDUCATION/TRAINING PROGRAM

## 2021-03-15 PROCEDURE — 999N001017 HC STATISTIC GLUCOSE BY METER IP

## 2021-03-15 PROCEDURE — 214N000001 HC R&B CCU UMMC

## 2021-03-15 PROCEDURE — 83735 ASSAY OF MAGNESIUM: CPT | Performed by: STUDENT IN AN ORGANIZED HEALTH CARE EDUCATION/TRAINING PROGRAM

## 2021-03-15 PROCEDURE — 85610 PROTHROMBIN TIME: CPT | Performed by: STUDENT IN AN ORGANIZED HEALTH CARE EDUCATION/TRAINING PROGRAM

## 2021-03-15 PROCEDURE — 80076 HEPATIC FUNCTION PANEL: CPT | Performed by: STUDENT IN AN ORGANIZED HEALTH CARE EDUCATION/TRAINING PROGRAM

## 2021-03-15 PROCEDURE — 93750 INTERROGATION VAD IN PERSON: CPT | Performed by: NURSE PRACTITIONER

## 2021-03-15 PROCEDURE — 250N000013 HC RX MED GY IP 250 OP 250 PS 637: Performed by: INTERNAL MEDICINE

## 2021-03-15 PROCEDURE — 36415 COLL VENOUS BLD VENIPUNCTURE: CPT | Performed by: STUDENT IN AN ORGANIZED HEALTH CARE EDUCATION/TRAINING PROGRAM

## 2021-03-15 RX ORDER — POTASSIUM CHLORIDE 750 MG/1
20 TABLET, EXTENDED RELEASE ORAL ONCE
Status: COMPLETED | OUTPATIENT
Start: 2021-03-15 | End: 2021-03-15

## 2021-03-15 RX ORDER — BUMETANIDE 2 MG/1
4 TABLET ORAL
Status: DISCONTINUED | OUTPATIENT
Start: 2021-03-16 | End: 2021-03-18

## 2021-03-15 RX ORDER — BUMETANIDE 2 MG/1
4 TABLET ORAL ONCE
Status: COMPLETED | OUTPATIENT
Start: 2021-03-15 | End: 2021-03-15

## 2021-03-15 RX ADMIN — GABAPENTIN 600 MG: 300 CAPSULE ORAL at 22:09

## 2021-03-15 RX ADMIN — BUPROPION HYDROCHLORIDE 150 MG: 150 TABLET, EXTENDED RELEASE ORAL at 09:04

## 2021-03-15 RX ADMIN — BUMETANIDE 4 MG: 2 TABLET ORAL at 09:10

## 2021-03-15 RX ADMIN — HYDRALAZINE HYDROCHLORIDE 50 MG: 50 TABLET ORAL at 15:08

## 2021-03-15 RX ADMIN — UMECLIDINIUM 1 PUFF: 62.5 AEROSOL, POWDER ORAL at 09:03

## 2021-03-15 RX ADMIN — Medication 10 MG: at 22:09

## 2021-03-15 RX ADMIN — GABAPENTIN 300 MG: 300 CAPSULE ORAL at 09:06

## 2021-03-15 RX ADMIN — MONTELUKAST 10 MG: 10 TABLET, FILM COATED ORAL at 22:09

## 2021-03-15 RX ADMIN — ISOSORBIDE DINITRATE 10 MG: 10 TABLET ORAL at 20:05

## 2021-03-15 RX ADMIN — POTASSIUM CHLORIDE 60 MEQ: 750 TABLET, EXTENDED RELEASE ORAL at 09:03

## 2021-03-15 RX ADMIN — WARFARIN SODIUM 9 MG: 5 TABLET ORAL at 18:17

## 2021-03-15 RX ADMIN — HYDRALAZINE HYDROCHLORIDE 50 MG: 50 TABLET ORAL at 09:10

## 2021-03-15 RX ADMIN — DOCUSATE SODIUM 50 MG AND SENNOSIDES 8.6 MG 1 TABLET: 8.6; 5 TABLET, FILM COATED ORAL at 20:05

## 2021-03-15 RX ADMIN — CETIRIZINE HYDROCHLORIDE 10 MG: 10 TABLET, FILM COATED ORAL at 09:03

## 2021-03-15 RX ADMIN — ASPIRIN 81 MG CHEWABLE TABLET 81 MG: 81 TABLET CHEWABLE at 09:10

## 2021-03-15 RX ADMIN — ISOSORBIDE DINITRATE 10 MG: 10 TABLET ORAL at 09:05

## 2021-03-15 RX ADMIN — ALLOPURINOL 300 MG: 300 TABLET ORAL at 09:05

## 2021-03-15 RX ADMIN — LIDOCAINE 2 PATCH: 560 PATCH PERCUTANEOUS; TOPICAL; TRANSDERMAL at 20:06

## 2021-03-15 RX ADMIN — HYDRALAZINE HYDROCHLORIDE 50 MG: 50 TABLET ORAL at 20:05

## 2021-03-15 RX ADMIN — GABAPENTIN 600 MG: 300 CAPSULE ORAL at 15:07

## 2021-03-15 RX ADMIN — POTASSIUM CHLORIDE 20 MEQ: 750 TABLET, EXTENDED RELEASE ORAL at 15:08

## 2021-03-15 RX ADMIN — AMIODARONE HYDROCHLORIDE 200 MG: 200 TABLET ORAL at 09:04

## 2021-03-15 RX ADMIN — FLUTICASONE FUROATE AND VILANTEROL TRIFENATATE 1 PUFF: 100; 25 POWDER RESPIRATORY (INHALATION) at 09:06

## 2021-03-15 RX ADMIN — ISOSORBIDE DINITRATE 10 MG: 10 TABLET ORAL at 15:07

## 2021-03-15 ASSESSMENT — ACTIVITIES OF DAILY LIVING (ADL)
ADLS_ACUITY_SCORE: 12

## 2021-03-15 ASSESSMENT — MIFFLIN-ST. JEOR: SCORE: 1736.57

## 2021-03-15 NOTE — PLAN OF CARE
D: Pt admit 2/8/21 awaiting heart transplant status 2e. PMH NICM EF 20% c/b VT s/p CRT-D s/p HM2 LVAD 6/19/2017 (originally destination therapy 2/2 obesity but now transplant candidate d/t weight loss    I/A:   Neuro: A&Ox4. Pt not to be disturbed [id number]  VS: VSS. RA  LVAD #'s WNL, no alarms this shift. Dressing CDI  Tele: SR 1st degree AVB  Pain: pt c/o pain in lower back controlled with PRN tylenol x1  GI/: Urinating adequately into bedside urinal. No BM this shift.  Diet: 3g Na  BG/insulin: ACHS BG checks. No BG done at 0200 per DND order  IV/Drips: R PIV SL  Activity: independent  Skin/drains: WDL    P: Plan to Continue to monitor pt status and report changes to cards 2.     Erna Rodriguez RN

## 2021-03-15 NOTE — PROCEDURES
The patient's HeartMate LVAD was interrogated 3/15/2021  * Speed 5400 rpm   * Pulsatility index 4-5.3  * Power 6.3-6.7 Manuel   * Flow 5.7-6.7 L/minute   Fluid status: euvolemic   Alarms were reviewed, and notable for increase PI events.  The driveline exit site was inspected, dressing cdi.   All external components were inspected and showed no evidence of damage or malfunction, none replaced.   No changes to VAD settings made

## 2021-03-15 NOTE — PLAN OF CARE
D/I/A: Pt here w/ CHF exacerbation now status 2E waiting for heart transplant. Ambulating frequently. RA, VSS, LVAD #s WNL. Dressing changed. K 3.3, replaced w/ scheduled +1x order, bumex dose decreased.  P: Continue to monitor.

## 2021-03-16 LAB
ANION GAP SERPL CALCULATED.3IONS-SCNC: 7 MMOL/L (ref 3–14)
BUN SERPL-MCNC: 50 MG/DL (ref 7–30)
CALCIUM SERPL-MCNC: 8.8 MG/DL (ref 8.5–10.1)
CHLORIDE SERPL-SCNC: 98 MMOL/L (ref 94–109)
CO2 SERPL-SCNC: 29 MMOL/L (ref 20–32)
CREAT SERPL-MCNC: 1.69 MG/DL (ref 0.66–1.25)
GFR SERPL CREATININE-BSD FRML MDRD: 42 ML/MIN/{1.73_M2}
GLUCOSE BLDC GLUCOMTR-MCNC: 102 MG/DL (ref 70–99)
GLUCOSE BLDC GLUCOMTR-MCNC: 181 MG/DL (ref 70–99)
GLUCOSE BLDC GLUCOMTR-MCNC: 89 MG/DL (ref 70–99)
GLUCOSE SERPL-MCNC: 91 MG/DL (ref 70–99)
INR PPP: 2.85 (ref 0.86–1.14)
MAGNESIUM SERPL-MCNC: 2.5 MG/DL (ref 1.6–2.3)
POTASSIUM SERPL-SCNC: 3.6 MMOL/L (ref 3.4–5.3)
SODIUM SERPL-SCNC: 134 MMOL/L (ref 133–144)

## 2021-03-16 PROCEDURE — 250N000013 HC RX MED GY IP 250 OP 250 PS 637: Performed by: NURSE PRACTITIONER

## 2021-03-16 PROCEDURE — 250N000013 HC RX MED GY IP 250 OP 250 PS 637: Performed by: INTERNAL MEDICINE

## 2021-03-16 PROCEDURE — 250N000013 HC RX MED GY IP 250 OP 250 PS 637: Performed by: PHYSICIAN ASSISTANT

## 2021-03-16 PROCEDURE — 83735 ASSAY OF MAGNESIUM: CPT | Performed by: STUDENT IN AN ORGANIZED HEALTH CARE EDUCATION/TRAINING PROGRAM

## 2021-03-16 PROCEDURE — 80048 BASIC METABOLIC PNL TOTAL CA: CPT | Performed by: STUDENT IN AN ORGANIZED HEALTH CARE EDUCATION/TRAINING PROGRAM

## 2021-03-16 PROCEDURE — 999N001017 HC STATISTIC GLUCOSE BY METER IP

## 2021-03-16 PROCEDURE — 250N000013 HC RX MED GY IP 250 OP 250 PS 637: Performed by: STUDENT IN AN ORGANIZED HEALTH CARE EDUCATION/TRAINING PROGRAM

## 2021-03-16 PROCEDURE — 36415 COLL VENOUS BLD VENIPUNCTURE: CPT | Performed by: STUDENT IN AN ORGANIZED HEALTH CARE EDUCATION/TRAINING PROGRAM

## 2021-03-16 PROCEDURE — 214N000001 HC R&B CCU UMMC

## 2021-03-16 PROCEDURE — 85610 PROTHROMBIN TIME: CPT | Performed by: STUDENT IN AN ORGANIZED HEALTH CARE EDUCATION/TRAINING PROGRAM

## 2021-03-16 PROCEDURE — 93750 INTERROGATION VAD IN PERSON: CPT | Performed by: NURSE PRACTITIONER

## 2021-03-16 PROCEDURE — 99232 SBSQ HOSP IP/OBS MODERATE 35: CPT | Mod: 24 | Performed by: NURSE PRACTITIONER

## 2021-03-16 RX ORDER — WARFARIN SODIUM 4 MG/1
8 TABLET ORAL
Status: COMPLETED | OUTPATIENT
Start: 2021-03-16 | End: 2021-03-16

## 2021-03-16 RX ADMIN — MONTELUKAST 10 MG: 10 TABLET, FILM COATED ORAL at 22:03

## 2021-03-16 RX ADMIN — ASPIRIN 81 MG CHEWABLE TABLET 81 MG: 81 TABLET CHEWABLE at 08:05

## 2021-03-16 RX ADMIN — BUPROPION HYDROCHLORIDE 150 MG: 150 TABLET, EXTENDED RELEASE ORAL at 08:05

## 2021-03-16 RX ADMIN — HYDRALAZINE HYDROCHLORIDE 50 MG: 50 TABLET ORAL at 14:09

## 2021-03-16 RX ADMIN — GABAPENTIN 600 MG: 300 CAPSULE ORAL at 22:03

## 2021-03-16 RX ADMIN — DOCUSATE SODIUM 50 MG AND SENNOSIDES 8.6 MG 2 TABLET: 8.6; 5 TABLET, FILM COATED ORAL at 08:12

## 2021-03-16 RX ADMIN — CETIRIZINE HYDROCHLORIDE 10 MG: 10 TABLET, FILM COATED ORAL at 08:05

## 2021-03-16 RX ADMIN — LIDOCAINE 2 PATCH: 560 PATCH PERCUTANEOUS; TOPICAL; TRANSDERMAL at 22:03

## 2021-03-16 RX ADMIN — ACETAMINOPHEN 650 MG: 325 TABLET, FILM COATED ORAL at 16:37

## 2021-03-16 RX ADMIN — DOCUSATE SODIUM 50 MG AND SENNOSIDES 8.6 MG 1 TABLET: 8.6; 5 TABLET, FILM COATED ORAL at 20:00

## 2021-03-16 RX ADMIN — BUMETANIDE 4 MG: 2 TABLET ORAL at 16:29

## 2021-03-16 RX ADMIN — WARFARIN SODIUM 8 MG: 4 TABLET ORAL at 17:55

## 2021-03-16 RX ADMIN — ISOSORBIDE DINITRATE 10 MG: 10 TABLET ORAL at 08:04

## 2021-03-16 RX ADMIN — BUMETANIDE 4 MG: 2 TABLET ORAL at 08:05

## 2021-03-16 RX ADMIN — HYDRALAZINE HYDROCHLORIDE 50 MG: 50 TABLET ORAL at 20:00

## 2021-03-16 RX ADMIN — Medication 10 MG: at 22:03

## 2021-03-16 RX ADMIN — ISOSORBIDE DINITRATE 10 MG: 10 TABLET ORAL at 20:00

## 2021-03-16 RX ADMIN — ALLOPURINOL 300 MG: 300 TABLET ORAL at 08:05

## 2021-03-16 RX ADMIN — HYDRALAZINE HYDROCHLORIDE 50 MG: 50 TABLET ORAL at 08:06

## 2021-03-16 RX ADMIN — AMIODARONE HYDROCHLORIDE 200 MG: 200 TABLET ORAL at 08:05

## 2021-03-16 RX ADMIN — GABAPENTIN 300 MG: 300 CAPSULE ORAL at 08:05

## 2021-03-16 RX ADMIN — UMECLIDINIUM 1 PUFF: 62.5 AEROSOL, POWDER ORAL at 08:04

## 2021-03-16 RX ADMIN — FLUTICASONE FUROATE AND VILANTEROL TRIFENATATE 1 PUFF: 100; 25 POWDER RESPIRATORY (INHALATION) at 08:04

## 2021-03-16 RX ADMIN — GABAPENTIN 600 MG: 300 CAPSULE ORAL at 14:10

## 2021-03-16 RX ADMIN — POTASSIUM CHLORIDE 60 MEQ: 750 TABLET, EXTENDED RELEASE ORAL at 08:11

## 2021-03-16 RX ADMIN — ISOSORBIDE DINITRATE 10 MG: 10 TABLET ORAL at 14:09

## 2021-03-16 ASSESSMENT — ACTIVITIES OF DAILY LIVING (ADL)
ADLS_ACUITY_SCORE: 12

## 2021-03-16 ASSESSMENT — MIFFLIN-ST. JEOR: SCORE: 1743.37

## 2021-03-16 NOTE — PROGRESS NOTES
[facility]   Cardiology II Service / Advanced Heart Failure  Daily Progress Note  Date of Service: 3/16/2021      Patient: Jim Willingham  MRN: [id number]  Admission Date: 2/8/2021  Hospital Day # 36    Assessment and Plan: Jim Willingham is a 65 year old male with history of NICM EF 20% c/b VT s/p CRT-D s/p HMII LVAD 6/19/2017 originally intended as destination therapy 2/2 obesity however with recent weight loss now candidate for transplantation. He is admitted for worsening functional status and renal function concerning for worsening heart failure. He is now listed status 2E for transplant.     Chronic systolic heart failure secondary to NICM s/p HM II LVAD. Echo 1/8/21 at 9400rpm, EF<30%m LVIDd 6.8cm, at least mildly reduced RV function, AoV closed without AI, mild-mod eccentric MR, dilated IVC without collapse. RHC 2/23 RA 7 PA 26/8(15) PCWP 6 Kee C/CO 4.6/2.2 TD CO/CI 5.5/2.6.    Stage D, NYHA Class III  ACEi/ARB contraindicated due to renal dysfunction. Hydralazine 75 mg po TID. Isordil 10 mg po TID.   BB contraindicated due to low cardiac output  Aldosterone antagonist contraindicated due to renal dysfunction  SCD prophylaxis CRT-D  Fluid status mild hypervolemia. Bumex 4 mg po BID.  MAP: 73  LDH: 248 3/7/21  Anticoagulation: Coumadin per pharmacy. INR- 2.85, goal 2.5-3, goal increased in setting of power spikes and elevated flows.   Antiplatelet: ASA 81 mg po daily  - remains on the cardiac transplant list status 2E.      The patient's HeartMate LVAD was interrogated 3/16/2021  * Speed 9600 rpm   * Pulsatility index 3.6  * Power 6.3 Manuel   * Flow 5.9 L/minute   Fluid status: Euvolemic   Alarms were reviewed, and notable for PI events.  All external components were inspected and showed no evidence of damage or malfunction.     Carpel tunnel, bilateral s/p bilateral release. Evidence of thenar atrophy. Relief with steroid injection 11/20. Carpal tunnel release 2/18/21.  - Appreciate  Ortho/Plastics consult.  - Scheduled Tylenol 650 mg po QID.   - Gabapentin 300 mg in AM, 600 mg in the afternoon, and 600 mg in the evening.   - Discuss OT consult with CR.      WALTER on CKD Stage III. Cr peaked at 2.22  - Cr-1.69 (1.64).     History of Afib. History of VT/VF. CHADSVASC-5.   - Continue Amiodarone 200 mg po daily.   - Coumadin as above.      Polyarticular Acute Gout, resolved. Minimal improvement with Colchicine times one. Anakinra 100 mg subcutaneous daily for 5 days completed. Repeated this week for acute flare.   - Appreciate Rheumatology consult.      DM type II, controlled. Symptomatic Hypoglycemia, resolved. Low Cortisol: Hgb A1C-6.2. Lantus and Novololg sliding scale insulin discontinued. Endo consult appreciated. C-peptide 13.9 an Proinsulin 18.4. Serum cortisol 7.7 3/6/21, cosyntropin stim test WNL per Endo.   -Per endo --> Fingerstick glucose might not be accurate for hypoglycemia, if patient has glucose <55 (without insulin), plasma glucose should be sent for confirmation of hypoglycemia prior to correction. While the patient is off insulin therapy, POC glucose readings above 60 are acceptable for the patient     Rhinovirus, resolved. Bacterial bronchitis. Completed 5 day course of Cefdinir. COVID negative 2/8. Resp PCR +rhinovirus. CXR 2/12 with no overt PNA. Stopped cefdinir 2/15. Cleared for transplant per ID. Repeat RVP negative 3/5. COVID repeated due to sinus congestion, negative.   - Congestion improved with transition from Claritin to Zyrtec.      Chronic/resolved conditions  COPD/Asthma: pta Breo Ellipta, albuterol prn.   Depression: pta Bupropion  Right sided weakness, resolved. CT head negative for acute findings. Appreciate Neuro eval.   Staph Hominis Bacteremia. No need for surveillance blood cx per transplant ID.       FEN: 2 gram sodium diet   PROPHY:  Coumadin  LINES:  PIV   DISPO:  TBD pending cardiac transplant.   CODE STATUS: Full  "Code   ================================================================  Interval History/ROS: He complains of fatigue given poor sleep last HS. He denies fever, chills, chest pain, palpitations, cough, nausea, vomiting, diarrhea, melena, hematochezia, and LE edema. He is tolerating oral intake and ambulation on the unit.     Last 24 hr care team notes reviewed.   ROS:  4 point ROS including Respiratory, CV, GI and , other than that noted in the HPI, is negative.     Medications: Reviewed in EPIC.     Physical Exam:   BP 96/79 (BP Location: Left arm)   Pulse 75   Temp 98  F (36.7  C) (Oral)   Resp 16   Ht 1.727 m (5' 8\")   Wt 97.4 kg (214 lb 11.2 oz)   SpO2 96%   BMI 32.65 kg/m    GENERAL: Appears alert and oriented times three.   HEENT: Eye symmetrical and free of discharge bilaterally. Mucous membranes moist and without lesions.  NECK: Supple and without lymphadenopathy. JVD 8 cm  CV: RRR, S1S2 present with LVAD hum.   RESPIRATORY: Respirations regular, even, and unlabored. Lungs CTA throughout.   GI: Soft and non distended with normoactive bowel sounds present in all quadrants. No tenderness, rebound, guarding. No organomegaly.   EXTREMITIES: No peripheral edema. 2+ bilateral pedal pulses.   NEUROLOGIC: Alert and orientated x 3. CN II-XII grossly intact. No focal deficits.   MUSCULOSKELETAL: No joint swelling or tenderness.   SKIN: No jaundice. No rashes or lesions. LVAD drive line covered.     Data:  CMP  Recent Labs   Lab 03/16/21  0549 03/15/21  0555 03/14/21  0610 03/13/21  0551    135 137 138   POTASSIUM 3.6 3.3* 3.6 3.7   CHLORIDE 98 98 103 105   CO2 29 29 28 26   ANIONGAP 7 9 6 8   GLC 91 102* 101* 90   BUN 50* 47* 42* 40*   CR 1.69* 1.67* 1.66* 1.52*   GFRESTIMATED 42* 43* 43* 48*   GFRESTBLACK 49* 49* 50* 55*   QAMAR 8.8 8.9 8.6 8.4*   MAG 2.5* 2.5* 2.6* 2.6*   PROTTOTAL  --  6.5*  --   --    ALBUMIN  --  3.4  --   --    BILITOTAL  --  0.6  --   --    ALKPHOS  --  102  --   --    AST  --  35 "  --   --    ALT  --  37  --   --      CBC  Recent Labs   Lab 03/15/21  0555 03/13/21  0551 03/11/21  0525   WBC 4.4 4.0 4.2   RBC 3.94* 3.63* 3.88*   HGB 11.6* 10.4* 11.3*   HCT 37.0* 34.0* 36.4*   MCV 94 94 94   MCH 29.4 28.7 29.1   MCHC 31.4* 30.6* 31.0*   RDW 17.8* 18.2* 18.4*    185 203     INR  Recent Labs   Lab 03/16/21  0549 03/15/21  0555 03/14/21  0610 03/13/21  0551   INR 2.85* 2.76* 2.89* 2.38*       Patient discussed with Dr. Mccarty.      Soraya Marshall [facility]  3/16/2021

## 2021-03-16 NOTE — PLAN OF CARE
Addendum to note by Bre Hernandez RN:  HeartMate II LVAD running at a fixed rate of 9600 rpm, no alarms during shift.  Flow 5.9-7.4, PI 2.8-4.5.  Drive line site clean and dry, no irritation or injury noted, dressing changed.

## 2021-03-16 NOTE — PLAN OF CARE
Temp: 98  F (36.7  C) Temp src: Axillary BP: (!) 80/66 Pulse: 71   Resp: 16 SpO2: 98 % O2 Device: None (Room air)      D: Statues 2E for heart txp. Hx NICM EF 20% c/b VT s/p CRT-D s/p LVAD HM3 2017    I/A: Monitored vitals and assessed pt status. A&O x4. VSS see flowsheet. SR. RA. LVAD numbers WNL, no alarms, dressing CDI. Denies pain. Voiding adequate amount. Up independenty. Sleeping between cares.    P: Continue to monitor and follow POC. Notify Cards 2 with changes.

## 2021-03-16 NOTE — PLAN OF CARE
D: Pt is a 65 year old with a history of NICM (EF 20%), VT treated with CRT-D, afib treated with DCCV, and CKD3. Pt has Heartmate 2 LVAD.  Pt presented for bilateral carpal tunnel surgery on 2/8 and has been inpatient waiting on a heart transplant.     I: Monitored vitals and assessed pt status. Supported pt comfort and preferred activities. Heat pad and meds per MAR provided to manage joint pain. Pt reports persisting pain (in shoulders, hips, and back) with some relief. Pt has +1 dependent edema that appears unchanged since 3/14/21. Gave diuretic meds per MAR and encouraged ambulation.     A: Alert and oriented x 4. Pt has LVAD and CRT-D in place and functioning as expected. Pt in sinus rhythm with first degree AV block and BBB.  Pt has been afebrile this shift.      Temp range: 97.9 to 98F  Pulse: 71 to 75 beats per minute  BP: systolic 83 to 100mmHg, diastolic 65 to 70mmHg  Respiratory: rate has ranged 16 to 18 breaths per minute, breath sounds clear in all lobes, oxygen sats have ranged from 96 to 97%, pt is on room air and uses CPAP while sleeping     Diet: Pt is on 3g sodium diet. Blood glucose is being monitored per orders.  Mobility: Pt is independent and steady on feet.   I/O: Input = ~3L, Output = ~2.1L; pt voiding into bedside urinal. Ambulation promoted, and pt given stool management meds per MAR.     P: Continue with plan of care. Pt verbalizes acceptance with plan.      Bre Hernandez RN

## 2021-03-17 LAB
ANION GAP SERPL CALCULATED.3IONS-SCNC: 8 MMOL/L (ref 3–14)
BUN SERPL-MCNC: 52 MG/DL (ref 7–30)
CALCIUM SERPL-MCNC: 8.3 MG/DL (ref 8.5–10.1)
CHLORIDE SERPL-SCNC: 102 MMOL/L (ref 94–109)
CO2 SERPL-SCNC: 27 MMOL/L (ref 20–32)
CREAT SERPL-MCNC: 1.79 MG/DL (ref 0.66–1.25)
ERYTHROCYTE [DISTWIDTH] IN BLOOD BY AUTOMATED COUNT: 18.1 % (ref 10–15)
GFR SERPL CREATININE-BSD FRML MDRD: 39 ML/MIN/{1.73_M2}
GLUCOSE BLDC GLUCOMTR-MCNC: 119 MG/DL (ref 70–99)
GLUCOSE BLDC GLUCOMTR-MCNC: 143 MG/DL (ref 70–99)
GLUCOSE BLDC GLUCOMTR-MCNC: 78 MG/DL (ref 70–99)
GLUCOSE BLDC GLUCOMTR-MCNC: 88 MG/DL (ref 70–99)
GLUCOSE SERPL-MCNC: 95 MG/DL (ref 70–99)
HCT VFR BLD AUTO: 35 % (ref 40–53)
HGB BLD-MCNC: 10.8 G/DL (ref 13.3–17.7)
INR PPP: 3.61 (ref 0.86–1.14)
MAGNESIUM SERPL-MCNC: 2.4 MG/DL (ref 1.6–2.3)
MCH RBC QN AUTO: 29.4 PG (ref 26.5–33)
MCHC RBC AUTO-ENTMCNC: 30.9 G/DL (ref 31.5–36.5)
MCV RBC AUTO: 95 FL (ref 78–100)
PLATELET # BLD AUTO: 179 10E9/L (ref 150–450)
POTASSIUM SERPL-SCNC: 3.6 MMOL/L (ref 3.4–5.3)
RBC # BLD AUTO: 3.67 10E12/L (ref 4.4–5.9)
SODIUM SERPL-SCNC: 137 MMOL/L (ref 133–144)
WBC # BLD AUTO: 4.8 10E9/L (ref 4–11)

## 2021-03-17 PROCEDURE — 250N000009 HC RX 250: Performed by: NURSE PRACTITIONER

## 2021-03-17 PROCEDURE — 250N000013 HC RX MED GY IP 250 OP 250 PS 637: Performed by: PHYSICIAN ASSISTANT

## 2021-03-17 PROCEDURE — 250N000013 HC RX MED GY IP 250 OP 250 PS 637: Performed by: NURSE PRACTITIONER

## 2021-03-17 PROCEDURE — 36415 COLL VENOUS BLD VENIPUNCTURE: CPT | Performed by: STUDENT IN AN ORGANIZED HEALTH CARE EDUCATION/TRAINING PROGRAM

## 2021-03-17 PROCEDURE — 83735 ASSAY OF MAGNESIUM: CPT | Performed by: STUDENT IN AN ORGANIZED HEALTH CARE EDUCATION/TRAINING PROGRAM

## 2021-03-17 PROCEDURE — 250N000013 HC RX MED GY IP 250 OP 250 PS 637

## 2021-03-17 PROCEDURE — 80048 BASIC METABOLIC PNL TOTAL CA: CPT | Performed by: STUDENT IN AN ORGANIZED HEALTH CARE EDUCATION/TRAINING PROGRAM

## 2021-03-17 PROCEDURE — 250N000013 HC RX MED GY IP 250 OP 250 PS 637: Performed by: STUDENT IN AN ORGANIZED HEALTH CARE EDUCATION/TRAINING PROGRAM

## 2021-03-17 PROCEDURE — 214N000001 HC R&B CCU UMMC

## 2021-03-17 PROCEDURE — 85610 PROTHROMBIN TIME: CPT | Performed by: STUDENT IN AN ORGANIZED HEALTH CARE EDUCATION/TRAINING PROGRAM

## 2021-03-17 PROCEDURE — 85027 COMPLETE CBC AUTOMATED: CPT | Performed by: STUDENT IN AN ORGANIZED HEALTH CARE EDUCATION/TRAINING PROGRAM

## 2021-03-17 PROCEDURE — 250N000013 HC RX MED GY IP 250 OP 250 PS 637: Performed by: INTERNAL MEDICINE

## 2021-03-17 PROCEDURE — 99232 SBSQ HOSP IP/OBS MODERATE 35: CPT | Mod: 24 | Performed by: NURSE PRACTITIONER

## 2021-03-17 PROCEDURE — 999N001017 HC STATISTIC GLUCOSE BY METER IP

## 2021-03-17 PROCEDURE — 93750 INTERROGATION VAD IN PERSON: CPT | Performed by: NURSE PRACTITIONER

## 2021-03-17 RX ORDER — OXYCODONE HYDROCHLORIDE 5 MG/1
5 TABLET ORAL EVERY 6 HOURS PRN
Status: COMPLETED | OUTPATIENT
Start: 2021-03-17 | End: 2021-03-17

## 2021-03-17 RX ORDER — WARFARIN SODIUM 5 MG/1
5 TABLET ORAL
Status: COMPLETED | OUTPATIENT
Start: 2021-03-17 | End: 2021-03-17

## 2021-03-17 RX ADMIN — BUMETANIDE 4 MG: 2 TABLET ORAL at 07:49

## 2021-03-17 RX ADMIN — GABAPENTIN 300 MG: 300 CAPSULE ORAL at 07:50

## 2021-03-17 RX ADMIN — FLUTICASONE FUROATE AND VILANTEROL TRIFENATATE 1 PUFF: 100; 25 POWDER RESPIRATORY (INHALATION) at 07:49

## 2021-03-17 RX ADMIN — Medication 10 MG: at 22:28

## 2021-03-17 RX ADMIN — AMIODARONE HYDROCHLORIDE 200 MG: 200 TABLET ORAL at 07:50

## 2021-03-17 RX ADMIN — GABAPENTIN 600 MG: 300 CAPSULE ORAL at 13:46

## 2021-03-17 RX ADMIN — ACETAMINOPHEN 650 MG: 325 TABLET, FILM COATED ORAL at 22:29

## 2021-03-17 RX ADMIN — BUMETANIDE 4 MG: 2 TABLET ORAL at 16:48

## 2021-03-17 RX ADMIN — POTASSIUM CHLORIDE 60 MEQ: 750 TABLET, EXTENDED RELEASE ORAL at 07:50

## 2021-03-17 RX ADMIN — OXYCODONE HYDROCHLORIDE 5 MG: 5 TABLET ORAL at 16:48

## 2021-03-17 RX ADMIN — UMECLIDINIUM 1 PUFF: 62.5 AEROSOL, POWDER ORAL at 07:49

## 2021-03-17 RX ADMIN — HYDRALAZINE HYDROCHLORIDE 50 MG: 50 TABLET ORAL at 07:49

## 2021-03-17 RX ADMIN — OXYCODONE HYDROCHLORIDE 5 MG: 5 TABLET ORAL at 10:30

## 2021-03-17 RX ADMIN — MONTELUKAST 10 MG: 10 TABLET, FILM COATED ORAL at 22:28

## 2021-03-17 RX ADMIN — ISOSORBIDE DINITRATE 10 MG: 10 TABLET ORAL at 22:29

## 2021-03-17 RX ADMIN — ANAKINRA 100 MG: 100 INJECTION, SOLUTION SUBCUTANEOUS at 12:54

## 2021-03-17 RX ADMIN — ASPIRIN 81 MG CHEWABLE TABLET 81 MG: 81 TABLET CHEWABLE at 07:50

## 2021-03-17 RX ADMIN — BUPROPION HYDROCHLORIDE 150 MG: 150 TABLET, EXTENDED RELEASE ORAL at 07:49

## 2021-03-17 RX ADMIN — HYDRALAZINE HYDROCHLORIDE 50 MG: 50 TABLET ORAL at 22:29

## 2021-03-17 RX ADMIN — ISOSORBIDE DINITRATE 10 MG: 10 TABLET ORAL at 13:47

## 2021-03-17 RX ADMIN — GABAPENTIN 600 MG: 300 CAPSULE ORAL at 22:28

## 2021-03-17 RX ADMIN — ISOSORBIDE DINITRATE 10 MG: 10 TABLET ORAL at 07:50

## 2021-03-17 RX ADMIN — CETIRIZINE HYDROCHLORIDE 10 MG: 10 TABLET, FILM COATED ORAL at 07:50

## 2021-03-17 RX ADMIN — OXYCODONE HYDROCHLORIDE 5 MG: 5 TABLET ORAL at 22:29

## 2021-03-17 RX ADMIN — ALLOPURINOL 300 MG: 300 TABLET ORAL at 07:50

## 2021-03-17 RX ADMIN — WARFARIN SODIUM 5 MG: 5 TABLET ORAL at 18:28

## 2021-03-17 RX ADMIN — HYDRALAZINE HYDROCHLORIDE 50 MG: 50 TABLET ORAL at 13:46

## 2021-03-17 ASSESSMENT — ACTIVITIES OF DAILY LIVING (ADL)
ADLS_ACUITY_SCORE: 10

## 2021-03-17 ASSESSMENT — MIFFLIN-ST. JEOR: SCORE: 1747.91

## 2021-03-17 NOTE — PROGRESS NOTES
[facility]   Cardiology II Service / Advanced Heart Failure  Daily Progress Note  Date of Service: 3/17/2021      Patient: Jim Willingham  MRN: [id number]  Admission Date: 2/8/2021  Hospital Day # 37    Assessment and Plan: Jim Willingham is a 65 year old male with history of NICM EF 20% c/b VT s/p CRT-D s/p HMII LVAD 6/19/2017 originally intended as destination therapy 2/2 obesity however with recent weight loss now candidate for transplantation. He is admitted for worsening functional status and renal function concerning for worsening heart failure. He is now listed status 2E for transplant.     Chronic systolic heart failure secondary to NICM s/p HM II LVAD. Echo 1/8/21 at 9400rpm, EF<30%m LVIDd 6.8cm, at least mildly reduced RV function, AoV closed without AI, mild-mod eccentric MR, dilated IVC without collapse. RHC 2/23 RA 7 PA 26/8(15) PCWP 6 Kee C/CO 4.6/2.2 TD CO/CI 5.5/2.6.    Stage D, NYHA Class III  ACEi/ARB contraindicated due to renal dysfunction. Hydralazine 75 mg po TID. Isordil 10 mg po TID.   BB contraindicated due to low cardiac output  Aldosterone antagonist contraindicated due to renal dysfunction  SCD prophylaxis CRT-D  Fluid status Euvolemic Bumex 4 mg po BID.  MAP: 73  LDH: 248 3/7/21  Anticoagulation: Coumadin per pharmacy. INR- 2.85, goal 2.5-3, goal increased in setting of power spikes and elevated flows.   Antiplatelet: ASA 81 mg po daily  - remains on the cardiac transplant list status 2E.      The patient's HeartMate LVAD was interrogated 3/17/2021  * Speed 9600 rpm   * Pulsatility index 3.2  * Power 7.2 Manuel   * Flow 7.2 L/minute   Fluid status: Euvolemic   Alarms were reviewed, and notable for PI events.  All external components were inspected and showed no evidence of damage or malfunction.     Carpel tunnel, bilateral s/p bilateral release. Evidence of thenar atrophy. Relief with steroid injection 11/20. Carpal tunnel release 2/18/21.  - Appreciate  Ortho/Plastics consult.  - Scheduled Tylenol 650 mg po QID.   - Gabapentin 300 mg in AM, 600 mg in the afternoon, and 600 mg in the evening.   - Discuss OT consult with CR.      WALTER on CKD Stage III. Cr peaked at 2.22  - Cr-1.79 (1.69).     History of Afib. History of VT/VF. CHADSVASC-5.   - Continue Amiodarone 200 mg po daily.   - Coumadin as above.      Polyarticular Acute Gout, resolved. Minimal improvement with Colchicine times one. Anakinra 100 mg subcutaneous daily for 5 days completed. Repeated last week for acute flare.   - Appreciate Rheumatology consult.   - Oxycodone 5 mg q6h prn for 3 doses.   - Anakinra resumed daily as recommended by Rheumatology.      DM type II, controlled. Symptomatic Hypoglycemia, resolved. Low Cortisol: Hgb A1C-6.2. Lantus and Novololg sliding scale insulin discontinued. Endo consult appreciated. C-peptide 13.9 an Proinsulin 18.4. Serum cortisol 7.7 3/6/21, cosyntropin stim test WNL per Endo.   -Per endo --> Fingerstick glucose might not be accurate for hypoglycemia, if patient has glucose <55 (without insulin), plasma glucose should be sent for confirmation of hypoglycemia prior to correction. While the patient is off insulin therapy, POC glucose readings above 60 are acceptable for the patient     Rhinovirus, resolved. Bacterial bronchitis. Completed 5 day course of Cefdinir. COVID negative 2/8. Resp PCR +rhinovirus. CXR 2/12 with no overt PNA. Stopped cefdinir 2/15. Cleared for transplant per ID. Repeat RVP negative 3/5. COVID repeated due to sinus congestion, negative.   - Congestion improved with transition from Claritin to Zyrtec.      Chronic/resolved conditions  COPD/Asthma: pta Breo Ellipta, albuterol prn.   Depression: pta Bupropion  Right sided weakness, resolved. CT head negative for acute findings. Appreciate Neuro eval.   Staph Hominis Bacteremia. No need for surveillance blood cx per transplant ID.       FEN: 2 gram sodium  "diet   PROPHY:  Coumadin  LINES:  PIV   DISPO:  TBD pending cardiac transplant.   CODE STATUS: Full Code   ================================================================    Interval History/ROS: He complains of right 5th toe and ankle gout pain. He denies fever, chills, chest pain, palpitations, cough, nausea, vomiting, diarrhea, melena, and hematochezia. He complains of mild LE edema. He is tolerating oral intake and limited ambulation due to gout pain.     Last 24 hr care team notes reviewed.   ROS:  4 point ROS including Respiratory, CV, GI and , other than that noted in the HPI, is negative.     Medications: Reviewed in EPIC.     Physical Exam:   BP 97/51 (BP Location: Right arm)   Pulse 82   Temp 98.3  F (36.8  C) (Oral)   Resp 12   Ht 1.727 m (5' 8\")   Wt 97.8 kg (215 lb 11.2 oz)   SpO2 98%   BMI 32.80 kg/m    GENERAL: Appears alert and oriented times three.   HEENT: Eye symmetrical and free of discharge bilaterally. Mucous membranes moist and without lesions.  NECK: Supple and without lymphadenopathy. JVD 8-10 cm.   CV: RRR, S1S2 present with LVAD hum.   RESPIRATORY: Respirations regular, even, and unlabored. Lungs CTA throughout.   GI: Soft and non distended with normoactive bowel sounds present in all quadrants. No tenderness, rebound, guarding. No organomegaly.   EXTREMITIES: Trace bilateral LE peripheral edema. 2+ bilateral pedal pulses.   NEUROLOGIC: Alert and orientated x 3. CN II-XII grossly intact. No focal deficits.   MUSCULOSKELETAL: No joint swelling or tenderness.   SKIN: No jaundice. No rashes or lesions. LVAD drive line covered.     Data:  CMP  Recent Labs   Lab 03/17/21  0534 03/16/21  0549 03/15/21  0555 03/14/21  0610    134 135 137   POTASSIUM 3.6 3.6 3.3* 3.6   CHLORIDE 102 98 98 103   CO2 27 29 29 28   ANIONGAP 8 7 9 6   GLC 95 91 102* 101*   BUN 52* 50* 47* 42*   CR 1.79* 1.69* 1.67* 1.66*   GFRESTIMATED 39* 42* 43* 43*   GFRESTBLACK 45* 49* 49* 50*   QAMAR 8.3* 8.8 8.9 8.6 "   MAG 2.4* 2.5* 2.5* 2.6*   PROTTOTAL  --   --  6.5*  --    ALBUMIN  --   --  3.4  --    BILITOTAL  --   --  0.6  --    ALKPHOS  --   --  102  --    AST  --   --  35  --    ALT  --   --  37  --      CBC  Recent Labs   Lab 03/17/21  0534 03/15/21  0555 03/13/21  0551 03/11/21  0525   WBC 4.8 4.4 4.0 4.2   RBC 3.67* 3.94* 3.63* 3.88*   HGB 10.8* 11.6* 10.4* 11.3*   HCT 35.0* 37.0* 34.0* 36.4*   MCV 95 94 94 94   MCH 29.4 29.4 28.7 29.1   MCHC 30.9* 31.4* 30.6* 31.0*   RDW 18.1* 17.8* 18.2* 18.4*    196 185 203     INR  Recent Labs   Lab 03/17/21  0534 03/16/21  0549 03/15/21  0555 03/14/21  0610   INR 3.61* 2.85* 2.76* 2.89*       Patient discussed with Dr. Mccarty.       Soraya Marshall [facility]  3/17/2021

## 2021-03-17 NOTE — PLAN OF CARE
D: Pt admit 2/8/21 awaiting heart transplant status 2e. PMH NICM EF 20% c/b VT s/p CRT-D s/p HM2 LVAD 6/19/2017 (originally destination therapy 2/2 obesity but now transplant candidate d/t weight loss     I/A:   Neuro: A&Ox4. Pt not to be disturbed [id number]  VS: VSS. RA  LVAD #'s WNL, no alarms this shift. Dressing CDI  Tele: SR 1st degree AVB & BBB  Pain: pt denies this shift  GI/: Urinating adequately into bedside urinal. No BM this shift.  Diet: 3g Na  BG/insulin: ACHS BG checks. No BG done at 0200 per DND order  IV/Drips: R PIV SL  Activity: independent  Skin/drains: WDL. Pt has bilateral hand incisions from carpal tunnel release procedure (2/18/21) CRISPIN CDI.      P: Plan to Continue to monitor pt status and report changes to cards 2.      Erna Rodriguez RN

## 2021-03-18 ENCOUNTER — DOCUMENTATION ONLY (OUTPATIENT)
Dept: TRANSPLANT | Facility: CLINIC | Age: 66
End: 2021-03-18

## 2021-03-18 ENCOUNTER — ANCILLARY PROCEDURE (OUTPATIENT)
Dept: CARDIOLOGY | Facility: CLINIC | Age: 66
DRG: 001 | End: 2021-03-18
Attending: INTERNAL MEDICINE
Payer: COMMERCIAL

## 2021-03-18 VITALS — WEIGHT: 216 LBS | BODY MASS INDEX: 32.84 KG/M2

## 2021-03-18 DIAGNOSIS — I42.8 NICM (NONISCHEMIC CARDIOMYOPATHY) (H): Primary | ICD-10-CM

## 2021-03-18 DIAGNOSIS — I42.8 NICM (NONISCHEMIC CARDIOMYOPATHY) (H): ICD-10-CM

## 2021-03-18 LAB
ANION GAP SERPL CALCULATED.3IONS-SCNC: 7 MMOL/L (ref 3–14)
BUN SERPL-MCNC: 49 MG/DL (ref 7–30)
CALCIUM SERPL-MCNC: 8.6 MG/DL (ref 8.5–10.1)
CHLORIDE SERPL-SCNC: 103 MMOL/L (ref 94–109)
CO2 SERPL-SCNC: 27 MMOL/L (ref 20–32)
CREAT SERPL-MCNC: 1.56 MG/DL (ref 0.66–1.25)
GFR SERPL CREATININE-BSD FRML MDRD: 46 ML/MIN/{1.73_M2}
GLUCOSE BLDC GLUCOMTR-MCNC: 179 MG/DL (ref 70–99)
GLUCOSE BLDC GLUCOMTR-MCNC: 260 MG/DL (ref 70–99)
GLUCOSE SERPL-MCNC: 98 MG/DL (ref 70–99)
INR PPP: 3.35 (ref 0.86–1.14)
MAGNESIUM SERPL-MCNC: 2.5 MG/DL (ref 1.6–2.3)
POTASSIUM SERPL-SCNC: 3.5 MMOL/L (ref 3.4–5.3)
SODIUM SERPL-SCNC: 137 MMOL/L (ref 133–144)

## 2021-03-18 PROCEDURE — 250N000013 HC RX MED GY IP 250 OP 250 PS 637: Performed by: STUDENT IN AN ORGANIZED HEALTH CARE EDUCATION/TRAINING PROGRAM

## 2021-03-18 PROCEDURE — 214N000001 HC R&B CCU UMMC

## 2021-03-18 PROCEDURE — 250N000013 HC RX MED GY IP 250 OP 250 PS 637: Performed by: NURSE PRACTITIONER

## 2021-03-18 PROCEDURE — 93283 PRGRMG EVAL IMPLANTABLE DFB: CPT

## 2021-03-18 PROCEDURE — 93283 PRGRMG EVAL IMPLANTABLE DFB: CPT | Mod: 26 | Performed by: INTERNAL MEDICINE

## 2021-03-18 PROCEDURE — 250N000013 HC RX MED GY IP 250 OP 250 PS 637: Performed by: PHYSICIAN ASSISTANT

## 2021-03-18 PROCEDURE — 85610 PROTHROMBIN TIME: CPT | Performed by: STUDENT IN AN ORGANIZED HEALTH CARE EDUCATION/TRAINING PROGRAM

## 2021-03-18 PROCEDURE — 83735 ASSAY OF MAGNESIUM: CPT | Performed by: STUDENT IN AN ORGANIZED HEALTH CARE EDUCATION/TRAINING PROGRAM

## 2021-03-18 PROCEDURE — 99232 SBSQ HOSP IP/OBS MODERATE 35: CPT | Mod: 24 | Performed by: NURSE PRACTITIONER

## 2021-03-18 PROCEDURE — 93750 INTERROGATION VAD IN PERSON: CPT | Performed by: NURSE PRACTITIONER

## 2021-03-18 PROCEDURE — 250N000009 HC RX 250: Performed by: NURSE PRACTITIONER

## 2021-03-18 PROCEDURE — 999N001017 HC STATISTIC GLUCOSE BY METER IP

## 2021-03-18 PROCEDURE — 250N000013 HC RX MED GY IP 250 OP 250 PS 637

## 2021-03-18 PROCEDURE — 36415 COLL VENOUS BLD VENIPUNCTURE: CPT | Performed by: STUDENT IN AN ORGANIZED HEALTH CARE EDUCATION/TRAINING PROGRAM

## 2021-03-18 PROCEDURE — 80048 BASIC METABOLIC PNL TOTAL CA: CPT | Performed by: STUDENT IN AN ORGANIZED HEALTH CARE EDUCATION/TRAINING PROGRAM

## 2021-03-18 PROCEDURE — 250N000013 HC RX MED GY IP 250 OP 250 PS 637: Performed by: INTERNAL MEDICINE

## 2021-03-18 RX ORDER — POTASSIUM CHLORIDE 750 MG/1
40 TABLET, EXTENDED RELEASE ORAL 2 TIMES DAILY
Status: DISCONTINUED | OUTPATIENT
Start: 2021-03-18 | End: 2021-05-06

## 2021-03-18 RX ORDER — BUMETANIDE 2 MG/1
4 TABLET ORAL DAILY
Status: DISCONTINUED | OUTPATIENT
Start: 2021-03-19 | End: 2021-03-19

## 2021-03-18 RX ORDER — WARFARIN SODIUM 5 MG/1
5 TABLET ORAL
Status: COMPLETED | OUTPATIENT
Start: 2021-03-18 | End: 2021-03-18

## 2021-03-18 RX ADMIN — BUMETANIDE 5 MG: 2 TABLET ORAL at 15:56

## 2021-03-18 RX ADMIN — DOCUSATE SODIUM 50 MG AND SENNOSIDES 8.6 MG 1 TABLET: 8.6; 5 TABLET, FILM COATED ORAL at 21:07

## 2021-03-18 RX ADMIN — AMIODARONE HYDROCHLORIDE 200 MG: 200 TABLET ORAL at 08:36

## 2021-03-18 RX ADMIN — ALLOPURINOL 300 MG: 300 TABLET ORAL at 08:36

## 2021-03-18 RX ADMIN — FLUTICASONE FUROATE AND VILANTEROL TRIFENATATE 1 PUFF: 100; 25 POWDER RESPIRATORY (INHALATION) at 08:39

## 2021-03-18 RX ADMIN — POTASSIUM CHLORIDE 40 MEQ: 750 TABLET, EXTENDED RELEASE ORAL at 21:06

## 2021-03-18 RX ADMIN — BUMETANIDE 4 MG: 2 TABLET ORAL at 08:37

## 2021-03-18 RX ADMIN — GABAPENTIN 300 MG: 300 CAPSULE ORAL at 08:37

## 2021-03-18 RX ADMIN — ISOSORBIDE DINITRATE 10 MG: 10 TABLET ORAL at 13:11

## 2021-03-18 RX ADMIN — GABAPENTIN 600 MG: 300 CAPSULE ORAL at 21:08

## 2021-03-18 RX ADMIN — POTASSIUM CHLORIDE 60 MEQ: 750 TABLET, EXTENDED RELEASE ORAL at 08:35

## 2021-03-18 RX ADMIN — GABAPENTIN 600 MG: 300 CAPSULE ORAL at 13:11

## 2021-03-18 RX ADMIN — ASPIRIN 81 MG CHEWABLE TABLET 81 MG: 81 TABLET CHEWABLE at 08:37

## 2021-03-18 RX ADMIN — MONTELUKAST 10 MG: 10 TABLET, FILM COATED ORAL at 21:09

## 2021-03-18 RX ADMIN — HYDRALAZINE HYDROCHLORIDE 50 MG: 50 TABLET ORAL at 21:08

## 2021-03-18 RX ADMIN — Medication 10 MG: at 21:09

## 2021-03-18 RX ADMIN — HYDRALAZINE HYDROCHLORIDE 50 MG: 50 TABLET ORAL at 13:11

## 2021-03-18 RX ADMIN — ANAKINRA 100 MG: 100 INJECTION, SOLUTION SUBCUTANEOUS at 08:39

## 2021-03-18 RX ADMIN — HYDRALAZINE HYDROCHLORIDE 50 MG: 50 TABLET ORAL at 08:37

## 2021-03-18 RX ADMIN — ISOSORBIDE DINITRATE 10 MG: 10 TABLET ORAL at 08:37

## 2021-03-18 RX ADMIN — BUPROPION HYDROCHLORIDE 150 MG: 150 TABLET, EXTENDED RELEASE ORAL at 08:37

## 2021-03-18 RX ADMIN — WARFARIN SODIUM 5 MG: 5 TABLET ORAL at 17:56

## 2021-03-18 RX ADMIN — DOCUSATE SODIUM 50 MG AND SENNOSIDES 8.6 MG 2 TABLET: 8.6; 5 TABLET, FILM COATED ORAL at 08:57

## 2021-03-18 RX ADMIN — ISOSORBIDE DINITRATE 10 MG: 10 TABLET ORAL at 21:09

## 2021-03-18 RX ADMIN — CETIRIZINE HYDROCHLORIDE 10 MG: 10 TABLET, FILM COATED ORAL at 08:39

## 2021-03-18 RX ADMIN — ACETAMINOPHEN 650 MG: 325 TABLET, FILM COATED ORAL at 06:14

## 2021-03-18 RX ADMIN — UMECLIDINIUM 1 PUFF: 62.5 AEROSOL, POWDER ORAL at 08:35

## 2021-03-18 ASSESSMENT — MIFFLIN-ST. JEOR: SCORE: 1753.35

## 2021-03-18 ASSESSMENT — ACTIVITIES OF DAILY LIVING (ADL)
ADLS_ACUITY_SCORE: 10

## 2021-03-18 NOTE — PROGRESS NOTES
[facility]   Cardiology II Service / Advanced Heart Failure  Daily Progress Note  Date of Service: 3/18/2021      Patient: Jim Willingham  MRN: [id number]  Admission Date: 2/8/2021  Hospital Day # 38    Assessment and Plan: Jim Willingham is a 65 year old male with history of NICM EF 20% c/b VT s/p CRT-D s/p HMII LVAD 6/19/2017 originally intended as destination therapy 2/2 obesity however with recent weight loss now candidate for transplantation. He is admitted for worsening functional status and renal function concerning for worsening heart failure. He is now listed status 2E for transplant.     Chronic systolic heart failure secondary to NICM s/p HM II LVAD. Echo 1/8/21 at 9400rpm, EF<30%m LVIDd 6.8cm, at least mildly reduced RV function, AoV closed without AI, mild-mod eccentric MR, dilated IVC without collapse. RHC 2/23 RA 7 PA 26/8(15) PCWP 6 Kee C/CO 4.6/2.2 TD CO/CI 5.5/2.6.    Stage D, NYHA Class III  ACEi/ARB contraindicated due to renal dysfunction. Hydralazine 75 mg po TID. Isordil 10 mg po TID.   BB contraindicated due to low cardiac output  Aldosterone antagonist contraindicated due to renal dysfunction  SCD prophylaxis CRT-D  Fluid status: Mild hypervolemia. Increase Bumex to 4 mg in AM and 5 mg in the evening.   MAP: 95. If no improvement increase Hydralazine in AM.   LDH: 248 3/7/21, repeat in AM.   Anticoagulation: Coumadin per pharmacy. INR- 3.35, goal 2.5-3, goal increased in setting of power spikes and elevated flows.   Antiplatelet: ASA 81 mg po daily  - remains on the cardiac transplant list status 2E. Persistent escalation of diuretics in setting of progressive heart failure with refractory hypervolemia.      The patient's HeartMate LVAD was interrogated 3/18/2021  * Speed 9600 rpm   * Pulsatility index 4  * Power 6.9 Manuel   * Flow 6.5 L/minute   Fluid status: mild hypervolemia  Alarms were reviewed, and notable for PI events.  All external components were inspected and  showed no evidence of damage or malfunction.     Carpel tunnel, bilateral s/p bilateral release. Evidence of thenar atrophy. Relief with steroid injection 11/20. Carpal tunnel release 2/18/21.  - Appreciate Ortho/Plastics consult.  - Scheduled Tylenol 650 mg po QID.   - Gabapentin 300 mg in AM, 600 mg in the afternoon, and 600 mg in the evening.      WALTER on CKD Stage III. Cr peaked at 2.22  - Cr-1.56 (1.79).     History of Afib. History of VT/VF. CHADSVASC-5.   - Continue Amiodarone 200 mg po daily.   - Coumadin as above.      Polyarticular Acute Gout, resolved. Minimal improvement with Colchicine times one. Anakinra 100 mg subcutaneous daily for 5 days completed. Repeated last week for acute flare.   - Appreciate Rheumatology consult.   - Oxycodone 5 mg q6h prn for 3 doses.   - Anakinra daily as recommended by Rheumatology.      DM type II, controlled. Symptomatic Hypoglycemia, resolved. Low Cortisol: Hgb A1C-6.2. Lantus and Novololg sliding scale insulin discontinued. Endo consult appreciated. C-peptide 13.9 an Proinsulin 18.4. Serum cortisol 7.7 3/6/21, cosyntropin stim test WNL per Endo.   -Per endo --> Fingerstick glucose might not be accurate for hypoglycemia, if patient has glucose <55 (without insulin), plasma glucose should be sent for confirmation of hypoglycemia prior to correction. While the patient is off insulin therapy, POC glucose readings above 60 are acceptable for the patient     Chronic/resolved conditions  COPD/Asthma: pta Breo Ellipta, albuterol prn.   Depression: pta Bupropion  Right sided weakness, resolved. CT head negative for acute findings. Appreciate Neuro eval.   Staph Hominis Bacteremia. No need for surveillance blood cx per transplant ID.    Rhinovirus, resolved. Bacterial bronchitis. Completed 5 day course of Cefdinir. COVID negative 2/8. Resp PCR +rhinovirus. CXR 2/12 with no overt PNA. Stopped cefdinir 2/15. Cleared for transplant per ID. Repeat RVP negative 3/5. COVID repeated due  "to sinus congestion, negative. Congestion improved with transition from Claritin to Zyrtec.      FEN: 2 gram sodium diet   PROPHY:  Coumadin  LINES:  PIV   DISPO:  TBD pending cardiac transplant.   CODE STATUS: Full Code   ================================================================    Interval History/ROS: He notes right ankle pain improved with Anakinra. He denies fever, chills, chest pain, palpitations, cough, nausea, vomiting, diarrhea, melena, hematochezia, and concerns at his drive line site. He complains of abdominal distention, mild RESENDIZ, and LE edema. He is tolerating ambulation and oral intake.     Last 24 hr care team notes reviewed.   ROS:  4 point ROS including Respiratory, CV, GI and , other than that noted in the HPI, is negative.     Medications: Reviewed in EPIC.     Physical Exam:   /65 (BP Location: Left arm)   Pulse 64   Temp 98  F (36.7  C) (Oral)   Resp 18   Ht 1.727 m (5' 8\")   Wt 98.4 kg (216 lb 14.4 oz)   SpO2 98%   BMI 32.98 kg/m    GENERAL: Appears alert and oriented times three.   HEENT: Eye symmetrical and free of discharge bilaterally. Mucous membranes moist and without lesions.  NECK: Supple and without lymphadenopathy. JVD 10-12 cm.   CV: RRR, S1S2 present with LVAD hum.   RESPIRATORY: Respirations regular, even, and unlabored. Lungs CTA throughout.   GI: Soft and mildly distended with normoactive bowel sounds present in all quadrants. No tenderness, rebound, guarding. No organomegaly.   EXTREMITIES: Trace bilateral LE peripheral edema. 2+ bilateral pedal pulses.   NEUROLOGIC: Alert and orientated x 3. CN II-XII grossly intact. No focal deficits.   MUSCULOSKELETAL: No joint swelling or tenderness.   SKIN: No jaundice. No rashes or lesions. LVAD drive line covered.     Data:  CMP  Recent Labs   Lab 03/18/21  0530 03/17/21  0534 03/16/21  0549 03/15/21  0555    137 134 135   POTASSIUM 3.5 3.6 3.6 3.3*   CHLORIDE 103 102 98 98   CO2 27 27 29 29   ANIONGAP 7 8 7 9 "   GLC 98 95 91 102*   BUN 49* 52* 50* 47*   CR 1.56* 1.79* 1.69* 1.67*   GFRESTIMATED 46* 39* 42* 43*   GFRESTBLACK 54* 45* 49* 49*   QAMAR 8.6 8.3* 8.8 8.9   MAG 2.5* 2.4* 2.5* 2.5*   PROTTOTAL  --   --   --  6.5*   ALBUMIN  --   --   --  3.4   BILITOTAL  --   --   --  0.6   ALKPHOS  --   --   --  102   AST  --   --   --  35   ALT  --   --   --  37     CBC  Recent Labs   Lab 03/17/21  0534 03/15/21  0555 03/13/21  0551   WBC 4.8 4.4 4.0   RBC 3.67* 3.94* 3.63*   HGB 10.8* 11.6* 10.4*   HCT 35.0* 37.0* 34.0*   MCV 95 94 94   MCH 29.4 29.4 28.7   MCHC 30.9* 31.4* 30.6*   RDW 18.1* 17.8* 18.2*    196 185     INR  Recent Labs   Lab 03/18/21  0530 03/17/21  0534 03/16/21  0549 03/15/21  0555   INR 3.35* 3.61* 2.85* 2.76*       Patient discussed with Dr. Mccarty.      Soraya Marshall [facility]  3/18/2021

## 2021-03-18 NOTE — PROGRESS NOTES
Pre-Transplant Social Work Services Progress Note      Date of Initial Social Work Evaluation: 2/10/2021  Collaborated with: Patient    Data: Jim continues with inpatient hospitalization on unit 6C awaiting heart transplant at status 2. Patient continues to work with therapies and was able to participate in heart transplant support group today from his hospital room.  Intervention: Met with Jim in his room in order to inquire into questions/concerns and assess coping. Reminded him of support group and provided login instructions so he could join group today.  Assessment: Jim was talkative and friendly. He voiced no psychosocial concerns. Seemed to participate in group today and made connections to other transplant patient. His Vionne and spirituality continue to be a big support to him and his ability to cope with his situation.  Education provided by SW: coverage SW services during time when usual SWer gone.  Plan:    Discharge Plans in Progress: TBD    Barriers to d/c plan: Unknown transplant date    Follow up Plan: SW will continue to follow for ongoing psychosocial support pre and post-heart transplant.

## 2021-03-18 NOTE — PROGRESS NOTES
"CLINICAL NUTRITION SERVICES - REASSESSMENT NOTE     Nutrition Prescription    RECOMMENDATIONS FOR MDs/PROVIDERS TO ORDER:  --Order the following (Sylvester-en-y Gastric Bypass) if pt agreeable:       --Multivitamin/minerals: adult dose 2 times daily       --Iron: 45-60 mg elemental daily (18-36 mg daily if low risk) - may partly or fully be covered in multivitamin        --Calcium Citrate containing vitamin D: 500 mg 3 times daily or 600 mg 2 times daily       --Vitamin B12: sublingual form of at least 500 mcg daily or injection of 1000 mcg monthly        --B-50 Complex daily    --Consider checking the following labs with R[facility] GB hx: Vitamins A, B1, B12, D, and E. Zinc, copper, ionized calcium, folic acid, and iron labs.    Malnutrition Status:    Patient does not meet two of the established criteria necessary for diagnosing malnutrition    Recommendations already ordered by Registered Dietitian (RD):  --Continue supplements:       --Glucerna BID (chocolate, strawberry - preferred; butter pecan okay to add variety).    Future/Additional Recommendations:  --If EN becomes POC:  --GOAL: Osmolite 1.5 Pete @ goal of 60ml/hr (1440ml/day) + 3 Prosource will provide: 2280 kcals (30 kcal/kg), 123 g PRO (1.6 g/kg), 1097 ml free H20, 293 g CHO, and 0 g fiber daily.  --Start TF @ 10 ml/hr and advance by 10 ml q 8 hrs until goal rate.  --Do not start or advance TF rate unless K+ >3.0, Mg++ > 1.5,  and Phos > 1.9.  --Minimum 30 ml q 4 hrs water flushes for tube patency.  --If gastric enteral access: HOB > 30 degrees  --MVI/minerals supplement if not already ordered       EVALUATION OF THE PROGRESS TOWARD GOALS   Diet: 3 g Sodium, strawberry and chocolate Boost Glucose Control or Glucerna @ 2 pm  Intake: 100% of meals per flowsheets      Visited with pt this morning. Pt reports good appetite, \"too good.\" Pt is drinking Glucerna or Boost Glucose Control as ordered and prefers the chocolate or strawberry over the butter pecan flavor. Pt " okay with butter pecan if unable to get the strawberry flavor due to hospital formulary changing. Pt is interested in trying strawberry Ensure Enlive (350 kcal, 20 g pro, 44 g CHO) if that is the only strawberry option. Pt denies nutrition questions/concerns at this time.      NEW FINDINGS   Weight: overall stable  Labs: K+ 3.5 (low normal), BUN 49 (H), Cr 1.56 (H)  Meds: bumex, klor-con, senna-docusate  GI: no documented BM since 3/12, however last BM 3/17 per pt  Renal: WALTER on CKD III    MALNUTRITION  % Intake: No decreased intake noted  % Weight Loss: None noted  Subcutaneous Fat Loss: None observed  Muscle Loss: None observed  Fluid Accumulation/Edema: trace 1+ LE edema  Malnutrition Diagnosis: Patient does not meet two of the established criteria necessary for diagnosing malnutrition    Previous Goals   Patient to consume % of nutritionally adequate meal trays TID, or the equivalent with supplements/snacks.  Evaluation: Met    Previous Nutrition Diagnosis  Predicted inadequate nutrient intake (protein-energy) related to food choices, LOS, and potential upcoming surgery and unknown NPO status duration.   Evaluation: No change    CURRENT NUTRITION DIAGNOSIS  Predicted inadequate nutrient intake (protein-energy) related to food choices, LOS increasing risk for menu fatigue, and potential upcoming surgery and unknown NPO status duration.    INTERVENTIONS  Implementation  Collaboration with other nutrition professionals - paged NSA regarding available supplements with new hospital formulary and pt preferences  Medical food supplement therapy    Goals  Patient to consume % of nutritionally adequate meal trays TID, or the equivalent with supplements/snacks.    Monitoring/Evaluation  Progress toward goals will be monitored and evaluated per protocol.    Tori Crowley, MS, RD, LD, CNSC  6C RD Pager: [phone]

## 2021-03-18 NOTE — PLAN OF CARE
Patient awaiting heart transplant, status 2E. PMH of NICM with EF of 20%, CKD3, COPD, carpal tunnel syndrome, HeartMate II implantation in 2017, VT resulting in CRT-D, and atrial fibrillation treated with DCCV.     Neuro: A&O x4, denied dizziness.  Continues to have chronic numbness in hands and feet related to neuropathy.  Respiratory:  Lung sounds clear to auscultation.  Cardiac: LVAD hum noted.  Had 1+ edema in right ankle and foot, was given scheduled bumetanide.  HeartMate II LVAD running at a fixed rate of 9600 rpm, no alarms during shift.  Flow 5.4-6.8, PI 3.1-4.5.  Drive line dressing changed, see PCS for assessment and treatment.    GI: Denied nausea, bowel sounds normoactive.  Reported no BM since 3/16, prn stool softener given. No BM this shift.  : Had good urine output, see I&O flowsheet.  Skin: Bilateral wrist incisions fully approximated, no bruising or drainage noted.   VS: In sinus rhythm with first degree AV block, BBB, and occasional to frequent PVCs.  HR 60s-70s, SBP 80s-100s, SaO2 96-98% on room air.     Electrolytes: Scheduled potassium given.  Pain: Reported right ankle pain due to gout, was given scheduled allopurinol and anakinra. Patient reported a decrease in pain.   Mobility: Ambulated in room independently, was steady on his feet.  Social: Attended LVAD/heart transplant support meeting via video conference, verbalized enjoyment of the experience.    Plan:  Continue to monitor pain, VS, heart rhythm, LVAD function, drive line site integrity, fluid status, bowel status, cardiac and respiratory status.  Notify care team of changes in patient condition or other concerns.  Encourage activity to maintain strength.  Encourage IS use to promote lung function.  Awaiting heart transplant, status to be reconsidered 3/24.

## 2021-03-18 NOTE — PLAN OF CARE
Patient awaiting heart transplant, status 2E. PMH of NICM with EF of 20%, CKD3, COPD, carpal tunnel syndrome, VT resulting in CRT-D, and atrial fibrillation treated with DCCV.     Neuro: A&O x4, denied dizziness.  Respiratory:  Lung sounds clear to auscultation.  Cardiac: LVAD hum noted.  Had 1+ edema in legs and feet, was given scheduled bumetanide.  HeartMate II LVAD running at a fixed rate of 9600 rpm, no alarms during shift.  Flow 6.0-7.5, PI 3.1-4.6.  Cards 2 NP aware of elevated flows, no intervention at this time.  Drive line site clean and dry, no irritation or injury noted.  Dressing changed.    GI: Denied nausea, bowel sounds normoactive.  : Had good urine output with scheduled bumetanide, see I&O flowsheet.  Skin: See PCS for assessment and treatment of wounds and surgical incisions.   VS: In sinus rhythm with first degree AV block and BBB, rare to occasional PVCs.  HR 60s-80s, SBP 80s-100s, SaO2 96-98% on room air.    Electrolytes: Scheduled potassium given.  Pain: Reported gout pain in right ankle, was given prn oxycodone, anakinra restarted. Also applied hot packs to area.  Patient reported decrease in pain.   Mobility: Ambulated in room independently, was steady on his feet.    Plan:  Continue to monitor pain, VS, heart rhythm, LVAD function, drive line site integrity, fluid status, bowel status, cardiac and respiratory status.  Notify care team of changes in patient condition or other concerns.  Encourage activity to maintain strength.  Awaiting heart transplant, status up for reconsideration 3/24.

## 2021-03-18 NOTE — PLAN OF CARE
D: Pt admit 2/8/21 awaiting heart transplant status 2e. PMH NICM EF 20% c/b VT s/p CRT-D s/p HM2 LVAD 6/19/2017 (originally destination therapy 2/2 obesity but now transplant candidate d/t weight loss     I/A:   Neuro: A&Ox4. Pt not to be disturbed [phone]. PRN melatonin before bed for sleep promotion   VS: VSS. RA  LVAD #'s WNL, no alarms this shift. Dressing CDI  Tele: SR 1st degree AVB & BBB  Pain: gout pain in R inner ankle controlled with PRN oxycodone x1 (last available dose), PRN tylenol x1  GI/: Urinating adequately into bedside urinal. No BM this shift.  Diet: 3g Na  BG/insulin: ACHS BG checks. No BG done at 0200 per DND order  IV/Drips: R PIV SL  Activity: independent  Skin/drains: WDL. Pt has bilateral hand incisions from carpal tunnel release procedure (2/18/21) CRISPIN CDI.      P: Plan to Continue to monitor pt status and report changes to cards 2.      Erna Rodriguez RN

## 2021-03-19 LAB
ANION GAP SERPL CALCULATED.3IONS-SCNC: 8 MMOL/L (ref 3–14)
BUN SERPL-MCNC: 42 MG/DL (ref 7–30)
CALCIUM SERPL-MCNC: 8.9 MG/DL (ref 8.5–10.1)
CHLORIDE SERPL-SCNC: 100 MMOL/L (ref 94–109)
CO2 SERPL-SCNC: 29 MMOL/L (ref 20–32)
CREAT SERPL-MCNC: 1.45 MG/DL (ref 0.66–1.25)
ERYTHROCYTE [DISTWIDTH] IN BLOOD BY AUTOMATED COUNT: 18 % (ref 10–15)
GFR SERPL CREATININE-BSD FRML MDRD: 51 ML/MIN/{1.73_M2}
GLUCOSE BLDC GLUCOMTR-MCNC: 175 MG/DL (ref 70–99)
GLUCOSE BLDC GLUCOMTR-MCNC: 200 MG/DL (ref 70–99)
GLUCOSE SERPL-MCNC: 90 MG/DL (ref 70–99)
HCT VFR BLD AUTO: 39.1 % (ref 40–53)
HGB BLD-MCNC: 12.3 G/DL (ref 13.3–17.7)
INR PPP: 2.77 (ref 0.86–1.14)
LABORATORY COMMENT REPORT: NORMAL
LDH SERPL L TO P-CCNC: 342 U/L (ref 85–227)
MAGNESIUM SERPL-MCNC: 2.5 MG/DL (ref 1.6–2.3)
MCH RBC QN AUTO: 29.7 PG (ref 26.5–33)
MCHC RBC AUTO-ENTMCNC: 31.5 G/DL (ref 31.5–36.5)
MCV RBC AUTO: 94 FL (ref 78–100)
PLATELET # BLD AUTO: 194 10E9/L (ref 150–450)
POTASSIUM SERPL-SCNC: 3.7 MMOL/L (ref 3.4–5.3)
RBC # BLD AUTO: 4.14 10E12/L (ref 4.4–5.9)
SARS-COV-2 RNA RESP QL NAA+PROBE: NEGATIVE
SODIUM SERPL-SCNC: 136 MMOL/L (ref 133–144)
SPECIMEN SOURCE: NORMAL
WBC # BLD AUTO: 4.3 10E9/L (ref 4–11)

## 2021-03-19 PROCEDURE — 99232 SBSQ HOSP IP/OBS MODERATE 35: CPT | Mod: 24 | Performed by: NURSE PRACTITIONER

## 2021-03-19 PROCEDURE — 250N000013 HC RX MED GY IP 250 OP 250 PS 637: Performed by: NURSE PRACTITIONER

## 2021-03-19 PROCEDURE — 36415 COLL VENOUS BLD VENIPUNCTURE: CPT | Performed by: STUDENT IN AN ORGANIZED HEALTH CARE EDUCATION/TRAINING PROGRAM

## 2021-03-19 PROCEDURE — 999N001017 HC STATISTIC GLUCOSE BY METER IP

## 2021-03-19 PROCEDURE — 85610 PROTHROMBIN TIME: CPT | Performed by: STUDENT IN AN ORGANIZED HEALTH CARE EDUCATION/TRAINING PROGRAM

## 2021-03-19 PROCEDURE — 83735 ASSAY OF MAGNESIUM: CPT | Performed by: STUDENT IN AN ORGANIZED HEALTH CARE EDUCATION/TRAINING PROGRAM

## 2021-03-19 PROCEDURE — 214N000001 HC R&B CCU UMMC

## 2021-03-19 PROCEDURE — 83615 LACTATE (LD) (LDH) ENZYME: CPT | Performed by: STUDENT IN AN ORGANIZED HEALTH CARE EDUCATION/TRAINING PROGRAM

## 2021-03-19 PROCEDURE — 80048 BASIC METABOLIC PNL TOTAL CA: CPT | Performed by: STUDENT IN AN ORGANIZED HEALTH CARE EDUCATION/TRAINING PROGRAM

## 2021-03-19 PROCEDURE — 250N000013 HC RX MED GY IP 250 OP 250 PS 637

## 2021-03-19 PROCEDURE — 250N000013 HC RX MED GY IP 250 OP 250 PS 637: Performed by: STUDENT IN AN ORGANIZED HEALTH CARE EDUCATION/TRAINING PROGRAM

## 2021-03-19 PROCEDURE — 250N000009 HC RX 250: Performed by: NURSE PRACTITIONER

## 2021-03-19 PROCEDURE — 93750 INTERROGATION VAD IN PERSON: CPT | Performed by: NURSE PRACTITIONER

## 2021-03-19 PROCEDURE — 85027 COMPLETE CBC AUTOMATED: CPT | Performed by: STUDENT IN AN ORGANIZED HEALTH CARE EDUCATION/TRAINING PROGRAM

## 2021-03-19 PROCEDURE — U0003 INFECTIOUS AGENT DETECTION BY NUCLEIC ACID (DNA OR RNA); SEVERE ACUTE RESPIRATORY SYNDROME CORONAVIRUS 2 (SARS-COV-2) (CORONAVIRUS DISEASE [COVID-19]), AMPLIFIED PROBE TECHNIQUE, MAKING USE OF HIGH THROUGHPUT TECHNOLOGIES AS DESCRIBED BY CMS-2020-01-R: HCPCS | Performed by: INTERNAL MEDICINE

## 2021-03-19 PROCEDURE — 250N000013 HC RX MED GY IP 250 OP 250 PS 637: Performed by: PHYSICIAN ASSISTANT

## 2021-03-19 PROCEDURE — U0005 INFEC AGEN DETEC AMPLI PROBE: HCPCS | Performed by: INTERNAL MEDICINE

## 2021-03-19 PROCEDURE — 250N000013 HC RX MED GY IP 250 OP 250 PS 637: Performed by: INTERNAL MEDICINE

## 2021-03-19 RX ADMIN — HYDRALAZINE HYDROCHLORIDE 50 MG: 50 TABLET ORAL at 21:53

## 2021-03-19 RX ADMIN — Medication 10 MG: at 21:53

## 2021-03-19 RX ADMIN — ANAKINRA 100 MG: 100 INJECTION, SOLUTION SUBCUTANEOUS at 08:30

## 2021-03-19 RX ADMIN — GABAPENTIN 600 MG: 300 CAPSULE ORAL at 13:43

## 2021-03-19 RX ADMIN — DOCUSATE SODIUM 50 MG AND SENNOSIDES 8.6 MG 1 TABLET: 8.6; 5 TABLET, FILM COATED ORAL at 21:52

## 2021-03-19 RX ADMIN — UMECLIDINIUM 1 PUFF: 62.5 AEROSOL, POWDER ORAL at 08:10

## 2021-03-19 RX ADMIN — BUMETANIDE 5 MG: 2 TABLET ORAL at 08:36

## 2021-03-19 RX ADMIN — AMIODARONE HYDROCHLORIDE 200 MG: 200 TABLET ORAL at 08:10

## 2021-03-19 RX ADMIN — ISOSORBIDE DINITRATE 10 MG: 10 TABLET ORAL at 13:43

## 2021-03-19 RX ADMIN — BUMETANIDE 5 MG: 2 TABLET ORAL at 15:55

## 2021-03-19 RX ADMIN — GABAPENTIN 600 MG: 300 CAPSULE ORAL at 21:53

## 2021-03-19 RX ADMIN — ALLOPURINOL 300 MG: 300 TABLET ORAL at 08:09

## 2021-03-19 RX ADMIN — GABAPENTIN 300 MG: 300 CAPSULE ORAL at 08:10

## 2021-03-19 RX ADMIN — BUPROPION HYDROCHLORIDE 150 MG: 150 TABLET, EXTENDED RELEASE ORAL at 08:10

## 2021-03-19 RX ADMIN — CETIRIZINE HYDROCHLORIDE 10 MG: 10 TABLET, FILM COATED ORAL at 08:09

## 2021-03-19 RX ADMIN — MONTELUKAST 10 MG: 10 TABLET, FILM COATED ORAL at 21:52

## 2021-03-19 RX ADMIN — ACETAMINOPHEN 650 MG: 325 TABLET, FILM COATED ORAL at 21:59

## 2021-03-19 RX ADMIN — POTASSIUM CHLORIDE 40 MEQ: 750 TABLET, EXTENDED RELEASE ORAL at 21:53

## 2021-03-19 RX ADMIN — WARFARIN SODIUM 9 MG: 5 TABLET ORAL at 18:08

## 2021-03-19 RX ADMIN — ISOSORBIDE DINITRATE 10 MG: 10 TABLET ORAL at 21:52

## 2021-03-19 RX ADMIN — ASPIRIN 81 MG CHEWABLE TABLET 81 MG: 81 TABLET CHEWABLE at 08:09

## 2021-03-19 RX ADMIN — FLUTICASONE FUROATE AND VILANTEROL TRIFENATATE 1 PUFF: 100; 25 POWDER RESPIRATORY (INHALATION) at 08:10

## 2021-03-19 RX ADMIN — HYDRALAZINE HYDROCHLORIDE 50 MG: 50 TABLET ORAL at 09:39

## 2021-03-19 RX ADMIN — HYDRALAZINE HYDROCHLORIDE 50 MG: 50 TABLET ORAL at 13:43

## 2021-03-19 RX ADMIN — POTASSIUM CHLORIDE 40 MEQ: 750 TABLET, EXTENDED RELEASE ORAL at 08:08

## 2021-03-19 RX ADMIN — ISOSORBIDE DINITRATE 10 MG: 10 TABLET ORAL at 09:38

## 2021-03-19 ASSESSMENT — ACTIVITIES OF DAILY LIVING (ADL)
ADLS_ACUITY_SCORE: 12
ADLS_ACUITY_SCORE: 10
ADLS_ACUITY_SCORE: 12
ADLS_ACUITY_SCORE: 10

## 2021-03-19 ASSESSMENT — MIFFLIN-ST. JEOR: SCORE: 1753.35

## 2021-03-19 NOTE — PROGRESS NOTES
[facility]   Cardiology II Service / Advanced Heart Failure  Daily Progress Note  Date of Service: 3/19/2021      Patient: Jim Willingham  MRN: [id number]  Admission Date: 2/8/2021  Hospital Day # 39    Assessment and Plan: Jim Willingham is a 65 year old male with history of NICM EF 20% c/b VT s/p CRT-D s/p HMII LVAD 6/19/2017 originally intended as destination therapy 2/2 obesity however with recent weight loss now candidate for transplantation. He is admitted for worsening functional status and renal function concerning for worsening heart failure. He is now listed status 2E for transplant.     Chronic systolic heart failure secondary to NICM s/p HM II LVAD. Echo 1/8/21 at 9400rpm, EF<30%m LVIDd 6.8cm, at least mildly reduced RV function, AoV closed without AI, mild-mod eccentric MR, dilated IVC without collapse. RHC 2/23 RA 7 PA 26/8(15) PCWP 6 Kee C/CO 4.6/2.2 TD CO/CI 5.5/2.6.    Stage D, NYHA Class III  ACEi/ARB contraindicated due to renal dysfunction. Hydralazine 75 mg po TID. Isordil 10 mg po TID.   BB contraindicated due to low cardiac output  Aldosterone antagonist contraindicated due to renal dysfunction  SCD prophylaxis CRT-D  Fluid status: Mild hypervolemia. Increase Bumex to 5 mg po BID.  MAP: 70-92.  LDH: 342  Anticoagulation: Coumadin per pharmacy. INR- 2.77, goal 2.5-3, goal increased in setting of power spikes and elevated flows this hospitalization.   Antiplatelet: ASA 81 mg po daily  - remains on the cardiac transplant list status 2E. Persistent escalation of diuretics in setting of progressive heart failure with refractory hypervolemia.      The patient's HeartMate LVAD was interrogated 3/19/2021  * Speed 9600 rpm   * Pulsatility index 3.5  * Power 6.9 Manuel   * Flow 6.5 L/minute   Fluid status: mild hypervolemia  Alarms were reviewed, and notable for PI events.  All external components were inspected and showed no evidence of damage or malfunction.     Carpel tunnel,  bilateral s/p bilateral release. Evidence of thenar atrophy. Relief with steroid injection 11/20. Carpal tunnel release 2/18/21.  - Appreciate Ortho/Plastics consult.  - Scheduled Tylenol 650 mg po QID.   - Gabapentin 300 mg in AM, 600 mg in the afternoon, and 600 mg in the evening.      WALTER on CKD Stage III. Cr peaked at 2.22  - Cr-1.45 (1.56).     History of Afib. History of VT/VF. CHADSVASC-5.   - Continue Amiodarone 200 mg po daily.   - Coumadin as above.      Polyarticular Acute Gout, resolved. Minimal improvement with Colchicine times one. Anakinra 100 mg subcutaneous daily for 5 days completed. Repeated last week for acute flare.   - Appreciate Rheumatology consult.   - Oxycodone 5 mg q6h prn for 3 doses.   - Anakinra daily as recommended by Rheumatology.      DM type II, controlled. Symptomatic Hypoglycemia, resolved. Low Cortisol: Hgb A1C-6.2. Lantus and Novololg sliding scale insulin discontinued. Endo consult appreciated. C-peptide 13.9 an Proinsulin 18.4. Serum cortisol 7.7 3/6/21, cosyntropin stim test WNL per Endo.   -Per endo --> Fingerstick glucose might not be accurate for hypoglycemia, if patient has glucose <55 (without insulin), plasma glucose should be sent for confirmation of hypoglycemia prior to correction. While the patient is off insulin therapy, POC glucose readings above 60 are acceptable for the patient     Chronic/resolved conditions  COPD/Asthma: pta Breo Ellipta, albuterol prn.   Depression: pta Bupropion  Right sided weakness, resolved. CT head negative for acute findings. Appreciate Neuro eval.   Staph Hominis Bacteremia. No need for surveillance blood cx per transplant ID.    Rhinovirus, resolved. Bacterial bronchitis. Completed 5 day course of Cefdinir. COVID negative 2/8. Resp PCR +rhinovirus. CXR 2/12 with no overt PNA. Stopped cefdinir 2/15. Cleared for transplant per ID. Repeat RVP negative 3/5. COVID repeated due to sinus congestion, negative. Congestion improved with  "transition from Claritin to Zyrtec.      FEN: 2 gram sodium diet   PROPHY:  Coumadin  LINES:  PIV   DISPO:  TBD pending cardiac transplant.   CODE STATUS: Full Code   ================================================================  Interval History/ROS: He denies right ankle gout pain. He notes mild persistent abdominal distention and LE edema. He denies fever, chills, chest pain, palpitations, cough, nausea, vomiting, diarrhea, melena, hematochezia, and concerns at his drive line site. He is tolerating oral intake and ambulation.     Last 24 hr care team notes reviewed.   ROS:  4 point ROS including Respiratory, CV, GI and , other than that noted in the HPI, is negative.     Medications: Reviewed in EPIC.     Physical Exam:   BP (!) 82/70 (BP Location: Left arm)   Pulse 70   Temp 98  F (36.7  C) (Oral)   Resp 18   Ht 1.727 m (5' 8\")   Wt 98.4 kg (216 lb 14.4 oz)   SpO2 98%   BMI 32.98 kg/m    GENERAL: Appears alert and oriented times three.   HEENT: Eye symmetrical and free of discharge bilaterally. Mucous membranes moist and without lesions.  NECK: Supple and without lymphadenopathy. JVD 10-12 cm.   CV: RRR, S1S2 present with LVAD hum.   RESPIRATORY: Respirations regular, even, and unlabored. Lungs CTA throughout.   GI: Soft and non distended with normoactive bowel sounds present in all quadrants. No tenderness, rebound, guarding. No organomegaly.   EXTREMITIES: +1 bilateral LE peripheral edema. 2+ bilateral pedal pulses.   NEUROLOGIC: Alert and orientated x 3. CN II-XII grossly intact. No focal deficits.   MUSCULOSKELETAL: No joint swelling or tenderness.   SKIN: No jaundice. No rashes or lesions. LVAD drive line covered.     Data:  CMP  Recent Labs   Lab 03/19/21  0548 03/18/21  0530 03/17/21  0534 03/16/21  0549 03/15/21  0555    137 137 134 135   POTASSIUM 3.7 3.5 3.6 3.6 3.3*   CHLORIDE 100 103 102 98 98   CO2 29 27 27 29 29   ANIONGAP 8 7 8 7 9   GLC 90 98 95 91 102*   BUN 42* 49* 52* 50* 47* "   CR 1.45* 1.56* 1.79* 1.69* 1.67*   GFRESTIMATED 51* 46* 39* 42* 43*   GFRESTBLACK 59* 54* 45* 49* 49*   QAMAR 8.9 8.6 8.3* 8.8 8.9   MAG 2.5* 2.5* 2.4* 2.5* 2.5*   PROTTOTAL  --   --   --   --  6.5*   ALBUMIN  --   --   --   --  3.4   BILITOTAL  --   --   --   --  0.6   ALKPHOS  --   --   --   --  102   AST  --   --   --   --  35   ALT  --   --   --   --  37     CBC  Recent Labs   Lab 03/19/21  0548 03/17/21  0534 03/15/21  0555 03/13/21  0551   WBC 4.3 4.8 4.4 4.0   RBC 4.14* 3.67* 3.94* 3.63*   HGB 12.3* 10.8* 11.6* 10.4*   HCT 39.1* 35.0* 37.0* 34.0*   MCV 94 95 94 94   MCH 29.7 29.4 29.4 28.7   MCHC 31.5 30.9* 31.4* 30.6*   RDW 18.0* 18.1* 17.8* 18.2*    179 196 185     INR  Recent Labs   Lab 03/19/21  0548 03/18/21  0530 03/17/21  0534 03/16/21  0549   INR 2.77* 3.35* 3.61* 2.85*       Patient discussed with Dr. Sears.      Soraya Marshall FNP  3/19/2021

## 2021-03-19 NOTE — PROGRESS NOTES
Neuro: A&Ox4.   Cardiac: Afebrile, VSS. SR with 1st degree AVB. HM2 LVAD #'s WNL.    Respiratory: RA   GI/: Voiding spontaneously. No BM this shift.   Diet/appetite: Tolerating 2g sodium, 2L FR diet. Denies nausea   Activity: Up ad francisco  Pain: Denies   Skin: No new deficits noted.  Lines: PIV SL   Drains: none   Replacement: Potassium    Pt has been resting comfortably, will continue to monitor and follow plan of care. Cards 2 pt. Awaiting Heart txp.

## 2021-03-19 NOTE — PLAN OF CARE
"D: Admitted with worsening renal function and functional status 2/8. Hx includes NICM, VT s/p CRT-D, HM2 LVAD (6/19/17). Listed status 2E for heart transplant. This admission had bilateral carpal tunnel release surgery 2/18.     I: Monitored vitals and assessed pt status. Do not disturb order from [id number].  Running:   -PIV saline locked.     A: A0x4. VSS, on room air/CPAP with sleep. Sinus rhythm with 1st degree AVB and occasional PVCs on monitor. HM2 LVAD without alarms and numbers within normal limits this shift. Afebrile. Denies pain. Adequate UOP. BM+. 3g Na diet. Drive line dressing CDI. Blood sugars twice daily + HS. Appeared to sleep well between cares. Up independently in room.     BP 99/85 (BP Location: Left arm)   Pulse 69   Temp 97.9  F (36.6  C) (Oral)   Resp 18   Ht 1.727 m (5' 8\")   Wt 98.4 kg (216 lb 14.4 oz)   SpO2 97%   BMI 32.98 kg/m         P: Continue to monitor Pt status and report changes to treatment team.  "

## 2021-03-20 LAB
ANION GAP SERPL CALCULATED.3IONS-SCNC: 9 MMOL/L (ref 3–14)
BUN SERPL-MCNC: 46 MG/DL (ref 7–30)
CALCIUM SERPL-MCNC: 9 MG/DL (ref 8.5–10.1)
CHLORIDE SERPL-SCNC: 103 MMOL/L (ref 94–109)
CO2 SERPL-SCNC: 27 MMOL/L (ref 20–32)
CREAT SERPL-MCNC: 1.46 MG/DL (ref 0.66–1.25)
GFR SERPL CREATININE-BSD FRML MDRD: 50 ML/MIN/{1.73_M2}
GLUCOSE BLDC GLUCOMTR-MCNC: 110 MG/DL (ref 70–99)
GLUCOSE BLDC GLUCOMTR-MCNC: 118 MG/DL (ref 70–99)
GLUCOSE SERPL-MCNC: 94 MG/DL (ref 70–99)
INR PPP: 2.49 (ref 0.86–1.14)
MAGNESIUM SERPL-MCNC: 2.4 MG/DL (ref 1.6–2.3)
POTASSIUM SERPL-SCNC: 4 MMOL/L (ref 3.4–5.3)
SODIUM SERPL-SCNC: 139 MMOL/L (ref 133–144)

## 2021-03-20 PROCEDURE — 250N000013 HC RX MED GY IP 250 OP 250 PS 637: Performed by: STUDENT IN AN ORGANIZED HEALTH CARE EDUCATION/TRAINING PROGRAM

## 2021-03-20 PROCEDURE — 93750 INTERROGATION VAD IN PERSON: CPT | Performed by: PHYSICIAN ASSISTANT

## 2021-03-20 PROCEDURE — 36415 COLL VENOUS BLD VENIPUNCTURE: CPT | Performed by: STUDENT IN AN ORGANIZED HEALTH CARE EDUCATION/TRAINING PROGRAM

## 2021-03-20 PROCEDURE — 214N000001 HC R&B CCU UMMC

## 2021-03-20 PROCEDURE — 250N000013 HC RX MED GY IP 250 OP 250 PS 637: Performed by: INTERNAL MEDICINE

## 2021-03-20 PROCEDURE — 80048 BASIC METABOLIC PNL TOTAL CA: CPT | Performed by: STUDENT IN AN ORGANIZED HEALTH CARE EDUCATION/TRAINING PROGRAM

## 2021-03-20 PROCEDURE — 999N001017 HC STATISTIC GLUCOSE BY METER IP

## 2021-03-20 PROCEDURE — 250N000013 HC RX MED GY IP 250 OP 250 PS 637: Performed by: PHYSICIAN ASSISTANT

## 2021-03-20 PROCEDURE — 250N000013 HC RX MED GY IP 250 OP 250 PS 637: Performed by: NURSE PRACTITIONER

## 2021-03-20 PROCEDURE — 83735 ASSAY OF MAGNESIUM: CPT | Performed by: STUDENT IN AN ORGANIZED HEALTH CARE EDUCATION/TRAINING PROGRAM

## 2021-03-20 PROCEDURE — 85610 PROTHROMBIN TIME: CPT | Performed by: STUDENT IN AN ORGANIZED HEALTH CARE EDUCATION/TRAINING PROGRAM

## 2021-03-20 PROCEDURE — 99232 SBSQ HOSP IP/OBS MODERATE 35: CPT | Mod: 24 | Performed by: PHYSICIAN ASSISTANT

## 2021-03-20 PROCEDURE — 250N000009 HC RX 250: Performed by: NURSE PRACTITIONER

## 2021-03-20 RX ORDER — OXYCODONE HYDROCHLORIDE 5 MG/1
5 TABLET ORAL ONCE
Status: COMPLETED | OUTPATIENT
Start: 2021-03-20 | End: 2021-03-20

## 2021-03-20 RX ORDER — WARFARIN SODIUM 7.5 MG/1
7.5 TABLET ORAL
Status: COMPLETED | OUTPATIENT
Start: 2021-03-20 | End: 2021-03-20

## 2021-03-20 RX ADMIN — CETIRIZINE HYDROCHLORIDE 10 MG: 10 TABLET, FILM COATED ORAL at 08:11

## 2021-03-20 RX ADMIN — ALLOPURINOL 300 MG: 300 TABLET ORAL at 08:11

## 2021-03-20 RX ADMIN — Medication 10 MG: at 21:28

## 2021-03-20 RX ADMIN — BUMETANIDE 5 MG: 2 TABLET ORAL at 15:52

## 2021-03-20 RX ADMIN — GABAPENTIN 600 MG: 300 CAPSULE ORAL at 13:42

## 2021-03-20 RX ADMIN — POTASSIUM CHLORIDE 40 MEQ: 750 TABLET, EXTENDED RELEASE ORAL at 08:10

## 2021-03-20 RX ADMIN — ISOSORBIDE DINITRATE 10 MG: 10 TABLET ORAL at 21:28

## 2021-03-20 RX ADMIN — OXYCODONE HYDROCHLORIDE 5 MG: 5 TABLET ORAL at 16:34

## 2021-03-20 RX ADMIN — GABAPENTIN 300 MG: 300 CAPSULE ORAL at 08:13

## 2021-03-20 RX ADMIN — FLUTICASONE FUROATE AND VILANTEROL TRIFENATATE 1 PUFF: 100; 25 POWDER RESPIRATORY (INHALATION) at 08:11

## 2021-03-20 RX ADMIN — POTASSIUM CHLORIDE 40 MEQ: 750 TABLET, EXTENDED RELEASE ORAL at 21:29

## 2021-03-20 RX ADMIN — HYDRALAZINE HYDROCHLORIDE 50 MG: 50 TABLET ORAL at 13:42

## 2021-03-20 RX ADMIN — ISOSORBIDE DINITRATE 10 MG: 10 TABLET ORAL at 08:11

## 2021-03-20 RX ADMIN — GABAPENTIN 600 MG: 300 CAPSULE ORAL at 21:28

## 2021-03-20 RX ADMIN — AMIODARONE HYDROCHLORIDE 200 MG: 200 TABLET ORAL at 08:10

## 2021-03-20 RX ADMIN — BUMETANIDE 5 MG: 2 TABLET ORAL at 08:16

## 2021-03-20 RX ADMIN — ACETAMINOPHEN 650 MG: 325 TABLET, FILM COATED ORAL at 13:38

## 2021-03-20 RX ADMIN — WARFARIN SODIUM 7.5 MG: 7.5 TABLET ORAL at 18:08

## 2021-03-20 RX ADMIN — ISOSORBIDE DINITRATE 10 MG: 10 TABLET ORAL at 13:43

## 2021-03-20 RX ADMIN — HYDRALAZINE HYDROCHLORIDE 50 MG: 50 TABLET ORAL at 21:29

## 2021-03-20 RX ADMIN — BUPROPION HYDROCHLORIDE 150 MG: 150 TABLET, EXTENDED RELEASE ORAL at 08:10

## 2021-03-20 RX ADMIN — HYDRALAZINE HYDROCHLORIDE 50 MG: 50 TABLET ORAL at 08:10

## 2021-03-20 RX ADMIN — UMECLIDINIUM 1 PUFF: 62.5 AEROSOL, POWDER ORAL at 08:11

## 2021-03-20 RX ADMIN — MONTELUKAST 10 MG: 10 TABLET, FILM COATED ORAL at 21:29

## 2021-03-20 RX ADMIN — ASPIRIN 81 MG CHEWABLE TABLET 81 MG: 81 TABLET CHEWABLE at 08:11

## 2021-03-20 RX ADMIN — ANAKINRA 100 MG: 100 INJECTION, SOLUTION SUBCUTANEOUS at 08:17

## 2021-03-20 RX ADMIN — ACETAMINOPHEN 650 MG: 325 TABLET, FILM COATED ORAL at 21:37

## 2021-03-20 ASSESSMENT — MIFFLIN-ST. JEOR: SCORE: 1766.51

## 2021-03-20 ASSESSMENT — ACTIVITIES OF DAILY LIVING (ADL)
ADLS_ACUITY_SCORE: 10

## 2021-03-20 NOTE — PROGRESS NOTES
Neuro: A&Ox4.   Cardiac: Afebrile, VSS. Soft BP's when pt laying on side.    Respiratory: RA   GI/: Voiding spontaneously. No BM this shift.   Diet/appetite: Tolerating 3g sodium diet. Denies nausea   Activity: Up independently  Pain: C/o R shoulder pain that extends down to tip of fingers. 1 Lidocaine patch in place, PRN tylenol given, one time dose of oxycodone given as pain persisted after giving tylenol and lido patch.   Skin: No new deficits noted.  Lines: PIV        Pt has been resting comfortably throughout night, will continue to monitor and follow plan of care.

## 2021-03-20 NOTE — PLAN OF CARE
"D: Admitted with worsening renal function and functional status 2/8. Hx includes NICM, VT s/p CRT-D, HM2 LVAD (6/19/17). Listed status 2E for heart transplant. This admission had bilateral carpal tunnel release surgery 2/18.     I: Monitored vitals and assessed pt status. Do not disturb order from 0500-0195.  Running:   -PIV saline locked.     A: A0x4. VSS, on room air/CPAP with sleep. Sinus rhythm with 1st degree AVB and occasional PVCs on monitor. HM2 LVAD without alarms and numbers within normal limits this shift. Afebrile. Denies pain. Adequate UOP. BM+. 3g Na diet. Drive line dressing CDI. Blood sugars twice daily + HS. Appeared to sleep well between cares. Up independently in room.      BP (!) 81/71 (BP Location: Left arm)   Pulse 70   Temp 98.1  F (36.7  C) (Oral)   Resp 18   Ht 1.727 m (5' 8\")   Wt 98.4 kg (216 lb 14.4 oz)   SpO2 97%   BMI 32.98 kg/m      P: Continue to monitor Pt status and report changes to treatment team.        "

## 2021-03-20 NOTE — PROGRESS NOTES
[facility]   Cardiology II Service / Advanced Heart Failure  Daily Progress Note      Patient: Jim Willingham  MRN: [id number]  Admission Date: 2/8/2021  Hospital Day # 40    Assessment and Plan: Jim Willingham is a 65 year old male with history of NICM EF 20% c/b VT s/p CRT-D s/p HMII LVAD 6/19/2017 originally intended as destination therapy 2/2 obesity however with recent weight loss now candidate for transplantation. He is admitted for worsening functional status and renal function concerning for worsening heart failure. He is now listed status 2E for transplant.    Changes today  - Continue current bumex of 5 mg PO BID, in crease tomorrow if weight uptrending  - No other changes today     Chronic systolic heart failure secondary to NICM s/p HM II LVAD. Echo 1/8/21 at 9400rpm, EF<30%m LVIDd 6.8cm, at least mildly reduced RV function, AoV closed without AI, mild-mod eccentric MR, dilated IVC without collapse. RHC 2/23 RA 7 PA 26/8(15) PCWP 6 Kee C/CO 4.6/2.2 TD CO/CI 5.5/2.6.    Stage D, NYHA Class III  ACEi/ARB contraindicated due to renal dysfunction. Hydralazine 50 mg po TID. Isordil 10 mg po TID.   BB contraindicated due to low cardiac output  Aldosterone antagonist contraindicated due to renal dysfunction  SCD prophylaxis CRT-D  Fluid status: Mild hypervolemia. Continue Bumex to 5 mg po BID (increased yesterday).  MAP: 70-92.  LDH: 342 3/19  Anticoagulation: Coumadin per pharmacy. INR- 2.49, goal 2.5-3, goal increased in setting of power spikes and elevated flows this hospitalization.   Antiplatelet: ASA 81 mg po daily  - remains on the cardiac transplant list status 2E. Persistent escalation of diuretics in setting of progressive heart failure with refractory hypervolemia.     Carpel tunnel, bilateral s/p bilateral release. Evidence of thenar atrophy. Relief with steroid injection 11/20. Carpal tunnel release 2/18/21.  - Appreciate Ortho/Plastics consult.  - Scheduled Tylenol 650 mg po QID.    - Gabapentin 300 mg in AM, 600 mg in the afternoon, and 600 mg in the evening.      WALTER on CKD Stage III. Cr peaked at 2.22  - Cr-1.46 (1.45).     History of Afib. History of VT/VF. CHADSVASC-5.   - Continue Amiodarone 200 mg po daily.   - Coumadin as above.      Polyarticular Acute Gout, resolved. Minimal improvement with Colchicine times one. Anakinra 100 mg subcutaneous daily for 5 days completed. Repeated last week for acute flare.   - Appreciate Rheumatology consult.   - Oxycodone 5 mg q6h prn for 3 doses.   - Anakinra daily as recommended by Rheumatology.      DM type II, controlled. Symptomatic Hypoglycemia, resolved. Low Cortisol: Hgb A1C-6.2. Lantus and Novololg sliding scale insulin discontinued. Endo consult appreciated. C-peptide 13.9 an Proinsulin 18.4. Serum cortisol 7.7 3/6/21, cosyntropin stim test WNL per Endo.   -Per endo --> Fingerstick glucose might not be accurate for hypoglycemia, if patient has glucose <55 (without insulin), plasma glucose should be sent for confirmation of hypoglycemia prior to correction. While the patient is off insulin therapy, POC glucose readings above 60 are acceptable for the patient     Chronic/resolved conditions  COPD/Asthma: pta Breo Ellipta, albuterol prn.   Depression: pta Bupropion  Right sided weakness, resolved. CT head negative for acute findings. Appreciate Neuro eval.   Staph Hominis Bacteremia. No need for surveillance blood cx per transplant ID.    Rhinovirus, resolved. Bacterial bronchitis. Completed 5 day course of Cefdinir. COVID negative 2/8. Resp PCR +rhinovirus. CXR 2/12 with no overt PNA. Stopped cefdinir 2/15. Cleared for transplant per ID. Repeat RVP negative 3/5. COVID repeated due to sinus congestion, negative. Congestion improved with transition from Claritin to Zyrtec.      FEN: 2 gram sodium diet   PROPHY:  Coumadin  LINES:  PIV   DISPO:  TBD pending cardiac transplant.   CODE STATUS: Full  "Code   ================================================================  Interval History/ROS: Gout pain resolved x3 days. He ate a cheeseburger from [employer] yesterday and now his weight is up. He notes mild persistent abdominal distention and LE edema. He denies fever, chills, chest pain, palpitations, cough, nausea, vomiting, diarrhea, melena, hematochezia, and concerns at his drive line site. He is tolerating oral intake and ambulation. Mood is good.    Last 24 hr care team notes reviewed.   ROS:  4 point ROS including Respiratory, CV, GI and , other than that noted in the HPI, is negative.     Medications: Reviewed in EPIC.     Physical Exam:   /60 (BP Location: Right arm)   Pulse 72   Temp 98.1  F (36.7  C) (Oral)   Resp 18   Ht 1.727 m (5' 8\")   Wt 99.7 kg (219 lb 12.8 oz)   SpO2 98%   BMI 33.42 kg/m    GENERAL: Appears alert and interacting appropriately.  HEENT: Eye symmetrical and free of discharge bilaterally. Mucous membranes moist and without lesions.  NECK: Supple and without lymphadenopathy. JVD 10-12 cm.   CV: RRR, S1S2 present with LVAD hum.   RESPIRATORY: Respirations regular, even, and unlabored. Lungs CTA throughout.   GI: Soft and non distended with normoactive bowel sounds present in all quadrants. No tenderness, rebound, guarding. No organomegaly.   EXTREMITIES: +1 bilateral LE peripheral edema. All extremities are warm and well perfused  NEUROLOGIC: Alert and interacting appropiratly. No focal deficits.   MUSCULOSKELETAL: No joint swelling or tenderness.   SKIN: No jaundice. No rashes or lesions. LVAD drive line covered.     Data:  CMP  Recent Labs   Lab 03/20/21  0642 03/19/21  0548 03/18/21  0530 03/17/21  0534 03/15/21  0555 03/15/21  0555    136 137 137   < > 135   POTASSIUM 4.0 3.7 3.5 3.6   < > 3.3*   CHLORIDE 103 100 103 102   < > 98   CO2 27 29 27 27   < > 29   ANIONGAP 9 8 7 8   < > 9   GLC 94 90 98 95   < > 102*   BUN 46* 42* 49* 52*   < > 47*   CR 1.46* " 1.45* 1.56* 1.79*   < > 1.67*   GFRESTIMATED 50* 51* 46* 39*   < > 43*   GFRESTBLACK 58* 59* 54* 45*   < > 49*   QAMAR 9.0 8.9 8.6 8.3*   < > 8.9   MAG 2.4* 2.5* 2.5* 2.4*   < > 2.5*   PROTTOTAL  --   --   --   --   --  6.5*   ALBUMIN  --   --   --   --   --  3.4   BILITOTAL  --   --   --   --   --  0.6   ALKPHOS  --   --   --   --   --  102   AST  --   --   --   --   --  35   ALT  --   --   --   --   --  37    < > = values in this interval not displayed.     CBC  Recent Labs   Lab 03/19/21  0548 03/17/21  0534 03/15/21  0555   WBC 4.3 4.8 4.4   RBC 4.14* 3.67* 3.94*   HGB 12.3* 10.8* 11.6*   HCT 39.1* 35.0* 37.0*   MCV 94 95 94   MCH 29.7 29.4 29.4   MCHC 31.5 30.9* 31.4*   RDW 18.0* 18.1* 17.8*    179 196     INR  Recent Labs   Lab 03/20/21  0642 03/19/21  0548 03/18/21  0530 03/17/21  0534   INR 2.49* 2.77* 3.35* 3.61*       Patient discussed with Dr. Sears.      NAOMI YeboahC  [facility]

## 2021-03-20 NOTE — PROGRESS NOTES
The patient's HeartMate LVAD was interrogated 3/20/2021  * Speed 9600 rpm   * Pulsatility index 4.8   * Power 6.5 Manuel   * Flow 6.1 L/minute   Fluid status: mild hypervolemia   Alarms were reviewed, and notable for frequent pi events, rare associated speed drops.   The driveline exit site was inspected, c/d/i.   All external components were inspected and showed no evidence of damage or malfunction, none replaced.   No changes to VAD settings made

## 2021-03-21 LAB
ANION GAP SERPL CALCULATED.3IONS-SCNC: 6 MMOL/L (ref 3–14)
BUN SERPL-MCNC: 47 MG/DL (ref 7–30)
CALCIUM SERPL-MCNC: 8.9 MG/DL (ref 8.5–10.1)
CHLORIDE SERPL-SCNC: 104 MMOL/L (ref 94–109)
CO2 SERPL-SCNC: 28 MMOL/L (ref 20–32)
CREAT SERPL-MCNC: 1.66 MG/DL (ref 0.66–1.25)
ERYTHROCYTE [DISTWIDTH] IN BLOOD BY AUTOMATED COUNT: 17.6 % (ref 10–15)
GFR SERPL CREATININE-BSD FRML MDRD: 43 ML/MIN/{1.73_M2}
GLUCOSE BLDC GLUCOMTR-MCNC: 110 MG/DL (ref 70–99)
GLUCOSE BLDC GLUCOMTR-MCNC: 137 MG/DL (ref 70–99)
GLUCOSE SERPL-MCNC: 85 MG/DL (ref 70–99)
HCT VFR BLD AUTO: 39.2 % (ref 40–53)
HGB BLD-MCNC: 12 G/DL (ref 13.3–17.7)
INR PPP: 2.36 (ref 0.86–1.14)
MAGNESIUM SERPL-MCNC: 2.4 MG/DL (ref 1.6–2.3)
MCH RBC QN AUTO: 29.6 PG (ref 26.5–33)
MCHC RBC AUTO-ENTMCNC: 30.6 G/DL (ref 31.5–36.5)
MCV RBC AUTO: 97 FL (ref 78–100)
PLATELET # BLD AUTO: 187 10E9/L (ref 150–450)
POTASSIUM SERPL-SCNC: 4.1 MMOL/L (ref 3.4–5.3)
RBC # BLD AUTO: 4.06 10E12/L (ref 4.4–5.9)
SODIUM SERPL-SCNC: 138 MMOL/L (ref 133–144)
WBC # BLD AUTO: 4.1 10E9/L (ref 4–11)

## 2021-03-21 PROCEDURE — 250N000013 HC RX MED GY IP 250 OP 250 PS 637: Performed by: PHYSICIAN ASSISTANT

## 2021-03-21 PROCEDURE — 250N000013 HC RX MED GY IP 250 OP 250 PS 637: Performed by: NURSE PRACTITIONER

## 2021-03-21 PROCEDURE — 85610 PROTHROMBIN TIME: CPT | Performed by: STUDENT IN AN ORGANIZED HEALTH CARE EDUCATION/TRAINING PROGRAM

## 2021-03-21 PROCEDURE — 999N001017 HC STATISTIC GLUCOSE BY METER IP

## 2021-03-21 PROCEDURE — 214N000001 HC R&B CCU UMMC

## 2021-03-21 PROCEDURE — 250N000013 HC RX MED GY IP 250 OP 250 PS 637: Performed by: INTERNAL MEDICINE

## 2021-03-21 PROCEDURE — 83735 ASSAY OF MAGNESIUM: CPT | Performed by: STUDENT IN AN ORGANIZED HEALTH CARE EDUCATION/TRAINING PROGRAM

## 2021-03-21 PROCEDURE — 85027 COMPLETE CBC AUTOMATED: CPT | Performed by: STUDENT IN AN ORGANIZED HEALTH CARE EDUCATION/TRAINING PROGRAM

## 2021-03-21 PROCEDURE — 250N000013 HC RX MED GY IP 250 OP 250 PS 637: Performed by: STUDENT IN AN ORGANIZED HEALTH CARE EDUCATION/TRAINING PROGRAM

## 2021-03-21 PROCEDURE — 99232 SBSQ HOSP IP/OBS MODERATE 35: CPT | Mod: 24 | Performed by: PHYSICIAN ASSISTANT

## 2021-03-21 PROCEDURE — 93750 INTERROGATION VAD IN PERSON: CPT | Performed by: PHYSICIAN ASSISTANT

## 2021-03-21 PROCEDURE — 250N000009 HC RX 250: Performed by: NURSE PRACTITIONER

## 2021-03-21 PROCEDURE — 36415 COLL VENOUS BLD VENIPUNCTURE: CPT | Performed by: STUDENT IN AN ORGANIZED HEALTH CARE EDUCATION/TRAINING PROGRAM

## 2021-03-21 PROCEDURE — 80048 BASIC METABOLIC PNL TOTAL CA: CPT | Performed by: STUDENT IN AN ORGANIZED HEALTH CARE EDUCATION/TRAINING PROGRAM

## 2021-03-21 RX ORDER — ACETAMINOPHEN 325 MG/1
650 TABLET ORAL EVERY 4 HOURS PRN
Status: DISCONTINUED | OUTPATIENT
Start: 2021-03-21 | End: 2021-05-06

## 2021-03-21 RX ORDER — METHOCARBAMOL 500 MG/1
500 TABLET, FILM COATED ORAL EVERY 6 HOURS PRN
Status: DISCONTINUED | OUTPATIENT
Start: 2021-03-21 | End: 2021-05-01

## 2021-03-21 RX ORDER — WARFARIN SODIUM 7.5 MG/1
7.5 TABLET ORAL
Status: COMPLETED | OUTPATIENT
Start: 2021-03-21 | End: 2021-03-21

## 2021-03-21 RX ADMIN — BUPROPION HYDROCHLORIDE 150 MG: 150 TABLET, EXTENDED RELEASE ORAL at 08:30

## 2021-03-21 RX ADMIN — ACETAMINOPHEN 650 MG: 325 TABLET, FILM COATED ORAL at 20:52

## 2021-03-21 RX ADMIN — METHOCARBAMOL 500 MG: 500 TABLET, FILM COATED ORAL at 12:10

## 2021-03-21 RX ADMIN — DOCUSATE SODIUM 50 MG AND SENNOSIDES 8.6 MG 1 TABLET: 8.6; 5 TABLET, FILM COATED ORAL at 20:52

## 2021-03-21 RX ADMIN — HYDRALAZINE HYDROCHLORIDE 50 MG: 50 TABLET ORAL at 20:52

## 2021-03-21 RX ADMIN — ISOSORBIDE DINITRATE 10 MG: 10 TABLET ORAL at 20:52

## 2021-03-21 RX ADMIN — FLUTICASONE FUROATE AND VILANTEROL TRIFENATATE 1 PUFF: 100; 25 POWDER RESPIRATORY (INHALATION) at 08:34

## 2021-03-21 RX ADMIN — ASPIRIN 81 MG CHEWABLE TABLET 81 MG: 81 TABLET CHEWABLE at 08:30

## 2021-03-21 RX ADMIN — BUMETANIDE 5 MG: 2 TABLET ORAL at 16:06

## 2021-03-21 RX ADMIN — MONTELUKAST 10 MG: 10 TABLET, FILM COATED ORAL at 22:15

## 2021-03-21 RX ADMIN — BUMETANIDE 5 MG: 2 TABLET ORAL at 08:32

## 2021-03-21 RX ADMIN — CETIRIZINE HYDROCHLORIDE 10 MG: 10 TABLET, FILM COATED ORAL at 08:30

## 2021-03-21 RX ADMIN — WARFARIN SODIUM 7.5 MG: 7.5 TABLET ORAL at 17:35

## 2021-03-21 RX ADMIN — ISOSORBIDE DINITRATE 10 MG: 10 TABLET ORAL at 08:30

## 2021-03-21 RX ADMIN — HYDRALAZINE HYDROCHLORIDE 50 MG: 50 TABLET ORAL at 14:02

## 2021-03-21 RX ADMIN — AMIODARONE HYDROCHLORIDE 200 MG: 200 TABLET ORAL at 08:31

## 2021-03-21 RX ADMIN — ALLOPURINOL 300 MG: 300 TABLET ORAL at 08:31

## 2021-03-21 RX ADMIN — Medication 10 MG: at 22:16

## 2021-03-21 RX ADMIN — ACETAMINOPHEN 650 MG: 325 TABLET, FILM COATED ORAL at 13:02

## 2021-03-21 RX ADMIN — GABAPENTIN 300 MG: 300 CAPSULE ORAL at 08:31

## 2021-03-21 RX ADMIN — DICLOFENAC SODIUM 2 G: 10 GEL TOPICAL at 22:17

## 2021-03-21 RX ADMIN — METHOCARBAMOL 500 MG: 500 TABLET, FILM COATED ORAL at 22:16

## 2021-03-21 RX ADMIN — UMECLIDINIUM 1 PUFF: 62.5 AEROSOL, POWDER ORAL at 08:34

## 2021-03-21 RX ADMIN — POTASSIUM CHLORIDE 40 MEQ: 750 TABLET, EXTENDED RELEASE ORAL at 20:52

## 2021-03-21 RX ADMIN — ANAKINRA 100 MG: 100 INJECTION, SOLUTION SUBCUTANEOUS at 08:37

## 2021-03-21 RX ADMIN — DICLOFENAC SODIUM 2 G: 10 GEL TOPICAL at 13:03

## 2021-03-21 RX ADMIN — GABAPENTIN 600 MG: 300 CAPSULE ORAL at 22:15

## 2021-03-21 RX ADMIN — ACETAMINOPHEN 650 MG: 325 TABLET, FILM COATED ORAL at 08:37

## 2021-03-21 RX ADMIN — POTASSIUM CHLORIDE 40 MEQ: 750 TABLET, EXTENDED RELEASE ORAL at 08:30

## 2021-03-21 RX ADMIN — HYDRALAZINE HYDROCHLORIDE 50 MG: 50 TABLET ORAL at 08:30

## 2021-03-21 RX ADMIN — GABAPENTIN 600 MG: 300 CAPSULE ORAL at 14:03

## 2021-03-21 RX ADMIN — ISOSORBIDE DINITRATE 10 MG: 10 TABLET ORAL at 14:03

## 2021-03-21 ASSESSMENT — ACTIVITIES OF DAILY LIVING (ADL)
ADLS_ACUITY_SCORE: 10

## 2021-03-21 ASSESSMENT — MIFFLIN-ST. JEOR: SCORE: 1763.78

## 2021-03-21 NOTE — PLAN OF CARE
D: Pt stable and comfortable. SR with 1st degree block, LVAD #s WNL. No pain or shortness of breath noted. Robaxin and voltaren gel added for shoulder pain.   I: Monitored/assessed pt. Assisted with cares.  A: Pt stable and comfortable.  P: Continue to monitor/assess pt, contact provider with concerns. Cards 2 PA notified that pt wants to discuss restarting anti-depressant.

## 2021-03-21 NOTE — PROGRESS NOTES
[facility]   Cardiology II Service / Advanced Heart Failure  Daily Progress Note      Patient: Jim Willingham  MRN: [id number]  Admission Date: 2/8/2021  Hospital Day # 41    Assessment and Plan: Jim Willingham is a 65 year old male with history of NICM EF 20% c/b VT s/p CRT-D s/p HMII LVAD 6/19/2017 originally intended as destination therapy 2/2 obesity however with recent weight loss now candidate for transplantation. He is admitted for worsening functional status and renal function concerning for worsening heart failure. He is now listed status 2E for transplant.    Changes today  - Continue current bumex of 5 mg PO BID  - Increase tylenol for shoulder pain  - Voltaren Gel for shoulder pain  - Robaxin for shoulder pain  - If he needs opioid for breathrough pain would prefer dilaudid to oxycodone d/t prior falls on oxycodone    Chronic systolic heart failure secondary to NICM s/p HM II LVAD. Echo 1/8/21 at 9400rpm, EF<30%m LVIDd 6.8cm, at least mildly reduced RV function, AoV closed without AI, mild-mod eccentric MR, dilated IVC without collapse. RHC 2/23 RA 7 PA 26/8(15) PCWP 6 Kee C/CO 4.6/2.2 TD CO/CI 5.5/2.6.    Stage D, NYHA Class III  ACEi/ARB contraindicated due to renal dysfunction. Hydralazine 50 mg po TID. Isordil 10 mg po TID.   BB contraindicated due to low cardiac output  Aldosterone antagonist contraindicated due to renal dysfunction  SCD prophylaxis CRT-D  Fluid status: Mild hypervolemia. Continue Bumex to 5 mg po BID (increased yesterday).  MAP: Goal 65-85  LDH: 342 3/19  Anticoagulation: Coumadin per pharmacy. INR- 2.36, goal 2.5-3, goal increased in setting of power spikes and elevated flows this hospitalization.   Antiplatelet: ASA 81 mg po daily  - remains on the cardiac transplant list status 2E. Persistent escalation of diuretics in setting of progressive heart failure with refractory hypervolemia.     Carpel tunnel, bilateral s/p bilateral release. Evidence of thenar atrophy.  Relief with steroid injection 11/20. Carpal tunnel release 2/18/21.  - Appreciate Ortho/Plastics consult.  - Scheduled Tylenol 650 mg po QID.   - Gabapentin 300 mg in AM, 600 mg in the afternoon, and 600 mg in the evening.      WALTER on CKD Stage III. Cr peaked at 2.22  - Cr-1.66 (1.46).     History of Afib. History of VT/VF. CHADSVASC-5.   - Continue Amiodarone 200 mg po daily.   - Coumadin as above.      Polyarticular Acute Gout, resolved.   - Appreciate Rheumatology consult.   - Oxycodone 5 mg q6h prn for 3 doses.   - Anakinra daily as recommended by Rheumatology.      DM type II, controlled. Symptomatic Hypoglycemia, resolved. Low Cortisol: Hgb A1C-6.2. Lantus and Novololg sliding scale insulin discontinued. Endo consult appreciated. C-peptide 13.9 an Proinsulin 18.4. Serum cortisol 7.7 3/6/21, cosyntropin stim test WNL per Endo.   -Per endo --> Fingerstick glucose might not be accurate for hypoglycemia, if patient has glucose <55 (without insulin), plasma glucose should be sent for confirmation of hypoglycemia prior to correction. While the patient is off insulin therapy, POC glucose readings above 60 are acceptable for the patient     Chronic/resolved conditions  COPD/Asthma: pta Breo Ellipta, albuterol prn.   Depression: pta Bupropion  Right sided weakness, resolved. CT head negative for acute findings. Appreciate Neuro eval.   Staph Hominis Bacteremia. No need for surveillance blood cx per transplant ID.    Rhinovirus, resolved. Bacterial bronchitis. Completed 5 day course of Cefdinir. COVID negative 2/8. Resp PCR +rhinovirus. CXR 2/12 with no overt PNA. Stopped cefdinir 2/15. Cleared for transplant per ID. Repeat RVP negative 3/5. COVID repeated due to sinus congestion, negative. Congestion improved with transition from Claritin to Zyrtec.      FEN: 2 gram sodium diet   PROPHY:  Coumadin  LINES:  PIV   DISPO:  TBD pending cardiac transplant.   CODE STATUS: Full  "Code   ================================================================  Interval History/ROS: Gout pain resolved. Now having left shoulder pain which he attributes to an old rorator cuff injury. Yesterday needed one dose of oxycodone. He notes mild persistent abdominal distention and LE edema. He denies fever, chills, chest pain, palpitations, cough, nausea, vomiting, diarrhea, melena, hematochezia, and concerns at his drive line site. He is tolerating oral intake and ambulation. Mood is optomistic.    Last 24 hr care team notes reviewed.   ROS:  4 point ROS including Respiratory, CV, GI and , other than that noted in the HPI, is negative.     Medications: Reviewed in EPIC.     Physical Exam:   BP (!) 84/64   Pulse 71   Temp 98  F (36.7  C) (Oral)   Resp 18   Ht 1.727 m (5' 8\")   Wt 99.4 kg (219 lb 3.2 oz)   SpO2 98%   BMI 33.33 kg/m    GENERAL: Appears alert and interacting appropriately.  HEENT: Eye symmetrical and free of discharge bilaterally. Mucous membranes moist and without lesions.  NECK: Supple and without lymphadenopathy. JVD 10-11 cm.   CV: RRR, S1S2 present with LVAD hum.   RESPIRATORY: Respirations regular, even, and unlabored. Lungs CTA throughout.   GI: Soft and non distended with normoactive bowel sounds present in all quadrants. No tenderness, rebound, guarding. No organomegaly.   EXTREMITIES: Trace-1+bilateral LE peripheral edema. All extremities are warm and well perfused  NEUROLOGIC: Alert and interacting appropiratly. No focal deficits.   MUSCULOSKELETAL: No joint swelling or tenderness.   SKIN: No jaundice. No rashes or lesions. LVAD drive line covered.     Data:  CMP  Recent Labs   Lab 03/21/21  0608 03/20/21  0642 03/19/21  0548 03/18/21  0530 03/15/21  0555 03/15/21  0555    139 136 137   < > 135   POTASSIUM 4.1 4.0 3.7 3.5   < > 3.3*   CHLORIDE 104 103 100 103   < > 98   CO2 28 27 29 27   < > 29   ANIONGAP 6 9 8 7   < > 9   GLC 85 94 90 98   < > 102*   BUN 47* 46* 42* 49*   " < > 47*   CR 1.66* 1.46* 1.45* 1.56*   < > 1.67*   GFRESTIMATED 43* 50* 51* 46*   < > 43*   GFRESTBLACK 50* 58* 59* 54*   < > 49*   QAMAR 8.9 9.0 8.9 8.6   < > 8.9   MAG 2.4* 2.4* 2.5* 2.5*   < > 2.5*   PROTTOTAL  --   --   --   --   --  6.5*   ALBUMIN  --   --   --   --   --  3.4   BILITOTAL  --   --   --   --   --  0.6   ALKPHOS  --   --   --   --   --  102   AST  --   --   --   --   --  35   ALT  --   --   --   --   --  37    < > = values in this interval not displayed.     CBC  Recent Labs   Lab 03/21/21  0608 03/19/21  0548 03/17/21  0534 03/15/21  0555   WBC 4.1 4.3 4.8 4.4   RBC 4.06* 4.14* 3.67* 3.94*   HGB 12.0* 12.3* 10.8* 11.6*   HCT 39.2* 39.1* 35.0* 37.0*   MCV 97 94 95 94   MCH 29.6 29.7 29.4 29.4   MCHC 30.6* 31.5 30.9* 31.4*   RDW 17.6* 18.0* 18.1* 17.8*    194 179 196     INR  Recent Labs   Lab 03/21/21  0608 03/20/21  0642 03/19/21  0548 03/18/21  0530   INR 2.36* 2.49* 2.77* 3.35*       Patient discussed with Dr. Sears.      Didi Butler PA-C  [facility]

## 2021-03-21 NOTE — PROGRESS NOTES
The patient's HeartMate LVAD was interrogated 3/21/2021  * Speed 9600 rpm   * Pulsatility index 3.8   * Power 7.4 Manuel   * Flow 7.6 L/minute   Fluid status: mild hypervolemia   Alarms were reviewed, and notable for frequent pi events, rare associated speed drops.   The driveline exit site was inspected, c/d/i.   All external components were inspected and showed no evidence of damage or malfunction, none replaced.   No changes to VAD settings made

## 2021-03-21 NOTE — PLAN OF CARE
"D: Admitted with worsening renal function and functional status 2/8. Hx includes NICM, VT s/p CRT-D, HM2 LVAD (2017). Now listed status 2D for heart transplant. Had carpal tunnel release surgery 2/18.    I: Monitored and assessed pt status. Encouraged activity and reinforced importance of fluid restriction. Do not disturb order from [id number]. PRN tylenol and scheduled lidoderm patches for shoulder pain.     A: AOx4. VSS on room air/CPAP with sleep. SR with 1st degree AVB on monitor. HM2 LVAD without alarms and numbers within normal limits. Afebrile. Endorses some shoulder pain, managed with current regimen. Voiding in adequate amounts. No BM this shift. On 3g Na diet. Drive line dsg CDI. Some edema in lower extremities (R>L). Blood sugars twice daily and HS. Appeared to sleep well between cares. Up independently in room.      BP (!) 84/68 (BP Location: Left arm)   Pulse 72   Temp 98  F (36.7  C) (Oral)   Resp 18   Ht 1.727 m (5' 8\")   Wt 99.7 kg (219 lb 12.8 oz)   SpO2 98%   BMI 33.42 kg/m      P: Continue to monitor and update primary team with changes/concerns.   "

## 2021-03-22 ENCOUNTER — APPOINTMENT (OUTPATIENT)
Dept: OCCUPATIONAL THERAPY | Facility: CLINIC | Age: 66
DRG: 001 | End: 2021-03-22
Attending: INTERNAL MEDICINE
Payer: COMMERCIAL

## 2021-03-22 LAB
ALBUMIN SERPL-MCNC: 3.3 G/DL (ref 3.4–5)
ALP SERPL-CCNC: 101 U/L (ref 40–150)
ALT SERPL W P-5'-P-CCNC: 36 U/L (ref 0–70)
ANION GAP SERPL CALCULATED.3IONS-SCNC: 6 MMOL/L (ref 3–14)
AST SERPL W P-5'-P-CCNC: 30 U/L (ref 0–45)
BILIRUB DIRECT SERPL-MCNC: 0.1 MG/DL (ref 0–0.2)
BILIRUB SERPL-MCNC: 0.4 MG/DL (ref 0.2–1.3)
BUN SERPL-MCNC: 52 MG/DL (ref 7–30)
CALCIUM SERPL-MCNC: 8.8 MG/DL (ref 8.5–10.1)
CHLORIDE SERPL-SCNC: 105 MMOL/L (ref 94–109)
CO2 SERPL-SCNC: 26 MMOL/L (ref 20–32)
CREAT SERPL-MCNC: 1.76 MG/DL (ref 0.66–1.25)
GFR SERPL CREATININE-BSD FRML MDRD: 40 ML/MIN/{1.73_M2}
GLUCOSE BLDC GLUCOMTR-MCNC: 107 MG/DL (ref 70–99)
GLUCOSE BLDC GLUCOMTR-MCNC: 134 MG/DL (ref 70–99)
GLUCOSE BLDC GLUCOMTR-MCNC: 58 MG/DL (ref 70–99)
GLUCOSE SERPL-MCNC: 116 MG/DL (ref 70–99)
INR PPP: 2.56 (ref 0.86–1.14)
MAGNESIUM SERPL-MCNC: 2.6 MG/DL (ref 1.6–2.3)
MDC_IDC_LEAD_IMPLANT_DT: NORMAL
MDC_IDC_LEAD_LOCATION: NORMAL
MDC_IDC_LEAD_LOCATION_DETAIL_1: NORMAL
MDC_IDC_LEAD_MFG: NORMAL
MDC_IDC_LEAD_MODEL: NORMAL
MDC_IDC_LEAD_POLARITY_TYPE: NORMAL
MDC_IDC_LEAD_SERIAL: NORMAL
MDC_IDC_MSMT_BATTERY_DTM: NORMAL
MDC_IDC_MSMT_BATTERY_REMAINING_LONGEVITY: 8 MO
MDC_IDC_MSMT_BATTERY_RRT_TRIGGER: 2.73
MDC_IDC_MSMT_BATTERY_STATUS: NORMAL
MDC_IDC_MSMT_BATTERY_VOLTAGE: 2.81 V
MDC_IDC_MSMT_CAP_CHARGE_DTM: NORMAL
MDC_IDC_MSMT_CAP_CHARGE_ENERGY: 18 J
MDC_IDC_MSMT_CAP_CHARGE_TIME: 4.83
MDC_IDC_MSMT_CAP_CHARGE_TYPE: NORMAL
MDC_IDC_MSMT_LEADCHNL_LV_IMPEDANCE_VALUE: 380 OHM
MDC_IDC_MSMT_LEADCHNL_LV_IMPEDANCE_VALUE: 4047 OHM
MDC_IDC_MSMT_LEADCHNL_LV_IMPEDANCE_VALUE: 4047 OHM
MDC_IDC_MSMT_LEADCHNL_LV_PACING_THRESHOLD_AMPLITUDE: 0.62 V
MDC_IDC_MSMT_LEADCHNL_LV_PACING_THRESHOLD_PULSEWIDTH: 0.4 MS
MDC_IDC_MSMT_LEADCHNL_RA_IMPEDANCE_VALUE: 513 OHM
MDC_IDC_MSMT_LEADCHNL_RA_PACING_THRESHOLD_AMPLITUDE: 1.25 V
MDC_IDC_MSMT_LEADCHNL_RA_PACING_THRESHOLD_AMPLITUDE: 1.75 V
MDC_IDC_MSMT_LEADCHNL_RA_PACING_THRESHOLD_PULSEWIDTH: 0.4 MS
MDC_IDC_MSMT_LEADCHNL_RA_PACING_THRESHOLD_PULSEWIDTH: 0.4 MS
MDC_IDC_MSMT_LEADCHNL_RA_SENSING_INTR_AMPL: 1.5 MV
MDC_IDC_MSMT_LEADCHNL_RA_SENSING_INTR_AMPL: 1.75 MV
MDC_IDC_MSMT_LEADCHNL_RV_IMPEDANCE_VALUE: 266 OHM
MDC_IDC_MSMT_LEADCHNL_RV_IMPEDANCE_VALUE: 380 OHM
MDC_IDC_MSMT_LEADCHNL_RV_PACING_THRESHOLD_AMPLITUDE: 1 V
MDC_IDC_MSMT_LEADCHNL_RV_PACING_THRESHOLD_AMPLITUDE: 1.5 V
MDC_IDC_MSMT_LEADCHNL_RV_PACING_THRESHOLD_PULSEWIDTH: 0.4 MS
MDC_IDC_MSMT_LEADCHNL_RV_PACING_THRESHOLD_PULSEWIDTH: 0.4 MS
MDC_IDC_MSMT_LEADCHNL_RV_SENSING_INTR_AMPL: 4.75 MV
MDC_IDC_MSMT_LEADCHNL_RV_SENSING_INTR_AMPL: 5.5 MV
MDC_IDC_PG_IMPLANT_DTM: NORMAL
MDC_IDC_PG_MFG: NORMAL
MDC_IDC_PG_MODEL: NORMAL
MDC_IDC_PG_SERIAL: NORMAL
MDC_IDC_PG_TYPE: NORMAL
MDC_IDC_SESS_CLINIC_NAME: NORMAL
MDC_IDC_SESS_DTM: NORMAL
MDC_IDC_SESS_TYPE: NORMAL
MDC_IDC_SET_BRADY_LOWRATE: 50 {BEATS}/MIN
MDC_IDC_SET_BRADY_MAX_SENSOR_RATE: 130 {BEATS}/MIN
MDC_IDC_SET_BRADY_MODE: NORMAL
MDC_IDC_SET_CRT_PACED_CHAMBERS: NORMAL
MDC_IDC_SET_LEADCHNL_LV_PACING_ANODE_ELECTRODE_1: NORMAL
MDC_IDC_SET_LEADCHNL_LV_PACING_ANODE_LOCATION_1: NORMAL
MDC_IDC_SET_LEADCHNL_LV_PACING_CATHODE_ELECTRODE_1: NORMAL
MDC_IDC_SET_LEADCHNL_LV_PACING_CATHODE_LOCATION_1: NORMAL
MDC_IDC_SET_LEADCHNL_LV_PACING_POLARITY: NORMAL
MDC_IDC_SET_LEADCHNL_RA_PACING_AMPLITUDE: 3 V
MDC_IDC_SET_LEADCHNL_RA_PACING_ANODE_ELECTRODE_1: NORMAL
MDC_IDC_SET_LEADCHNL_RA_PACING_ANODE_LOCATION_1: NORMAL
MDC_IDC_SET_LEADCHNL_RA_PACING_CATHODE_ELECTRODE_1: NORMAL
MDC_IDC_SET_LEADCHNL_RA_PACING_CATHODE_LOCATION_1: NORMAL
MDC_IDC_SET_LEADCHNL_RA_PACING_POLARITY: NORMAL
MDC_IDC_SET_LEADCHNL_RA_PACING_PULSEWIDTH: 0.4 MS
MDC_IDC_SET_LEADCHNL_RA_SENSING_ANODE_ELECTRODE_1: NORMAL
MDC_IDC_SET_LEADCHNL_RA_SENSING_ANODE_LOCATION_1: NORMAL
MDC_IDC_SET_LEADCHNL_RA_SENSING_CATHODE_ELECTRODE_1: NORMAL
MDC_IDC_SET_LEADCHNL_RA_SENSING_CATHODE_LOCATION_1: NORMAL
MDC_IDC_SET_LEADCHNL_RA_SENSING_POLARITY: NORMAL
MDC_IDC_SET_LEADCHNL_RA_SENSING_SENSITIVITY: 0.45 MV
MDC_IDC_SET_LEADCHNL_RV_PACING_AMPLITUDE: 2.5 V
MDC_IDC_SET_LEADCHNL_RV_PACING_ANODE_ELECTRODE_1: NORMAL
MDC_IDC_SET_LEADCHNL_RV_PACING_ANODE_LOCATION_1: NORMAL
MDC_IDC_SET_LEADCHNL_RV_PACING_CATHODE_ELECTRODE_1: NORMAL
MDC_IDC_SET_LEADCHNL_RV_PACING_CATHODE_LOCATION_1: NORMAL
MDC_IDC_SET_LEADCHNL_RV_PACING_POLARITY: NORMAL
MDC_IDC_SET_LEADCHNL_RV_PACING_PULSEWIDTH: 0.4 MS
MDC_IDC_SET_LEADCHNL_RV_SENSING_ANODE_ELECTRODE_1: NORMAL
MDC_IDC_SET_LEADCHNL_RV_SENSING_ANODE_LOCATION_1: NORMAL
MDC_IDC_SET_LEADCHNL_RV_SENSING_CATHODE_ELECTRODE_1: NORMAL
MDC_IDC_SET_LEADCHNL_RV_SENSING_CATHODE_LOCATION_1: NORMAL
MDC_IDC_SET_LEADCHNL_RV_SENSING_POLARITY: NORMAL
MDC_IDC_SET_LEADCHNL_RV_SENSING_SENSITIVITY: 0.3 MV
MDC_IDC_SET_ZONE_DETECTION_BEATS_DENOMINATOR: 40 {BEATS}
MDC_IDC_SET_ZONE_DETECTION_BEATS_NUMERATOR: 30 {BEATS}
MDC_IDC_SET_ZONE_DETECTION_INTERVAL: 240 MS
MDC_IDC_SET_ZONE_DETECTION_INTERVAL: 270 MS
MDC_IDC_SET_ZONE_DETECTION_INTERVAL: 360 MS
MDC_IDC_SET_ZONE_DETECTION_INTERVAL: 400 MS
MDC_IDC_SET_ZONE_DETECTION_INTERVAL: 430 MS
MDC_IDC_SET_ZONE_TYPE: NORMAL
MDC_IDC_STAT_AT_BURDEN_PERCENT: 0 %
MDC_IDC_STAT_AT_DTM_END: NORMAL
MDC_IDC_STAT_AT_DTM_START: NORMAL
MDC_IDC_STAT_BRADY_AP_VP_PERCENT: 0 %
MDC_IDC_STAT_BRADY_AP_VS_PERCENT: 0.05 %
MDC_IDC_STAT_BRADY_AS_VP_PERCENT: 0 %
MDC_IDC_STAT_BRADY_AS_VS_PERCENT: 99.95 %
MDC_IDC_STAT_BRADY_DTM_END: NORMAL
MDC_IDC_STAT_BRADY_DTM_START: NORMAL
MDC_IDC_STAT_BRADY_RA_PERCENT_PACED: 0.07 %
MDC_IDC_STAT_BRADY_RV_PERCENT_PACED: 0 %
MDC_IDC_STAT_CRT_DTM_END: NORMAL
MDC_IDC_STAT_CRT_DTM_START: NORMAL
MDC_IDC_STAT_CRT_LV_PERCENT_PACED: 0 %
MDC_IDC_STAT_CRT_PERCENT_PACED: 0 %
MDC_IDC_STAT_EPISODE_RECENT_COUNT: 0
MDC_IDC_STAT_EPISODE_RECENT_COUNT_DTM_END: NORMAL
MDC_IDC_STAT_EPISODE_RECENT_COUNT_DTM_START: NORMAL
MDC_IDC_STAT_EPISODE_TOTAL_COUNT: 0
MDC_IDC_STAT_EPISODE_TOTAL_COUNT: 0
MDC_IDC_STAT_EPISODE_TOTAL_COUNT: 189
MDC_IDC_STAT_EPISODE_TOTAL_COUNT: 2
MDC_IDC_STAT_EPISODE_TOTAL_COUNT: 20
MDC_IDC_STAT_EPISODE_TOTAL_COUNT: 22
MDC_IDC_STAT_EPISODE_TOTAL_COUNT: 292
MDC_IDC_STAT_EPISODE_TOTAL_COUNT_DTM_END: NORMAL
MDC_IDC_STAT_EPISODE_TOTAL_COUNT_DTM_START: NORMAL
MDC_IDC_STAT_EPISODE_TYPE: NORMAL
MDC_IDC_STAT_TACHYTHERAPY_ATP_DELIVERED_RECENT: 0
MDC_IDC_STAT_TACHYTHERAPY_ATP_DELIVERED_TOTAL: 14
MDC_IDC_STAT_TACHYTHERAPY_RECENT_DTM_END: NORMAL
MDC_IDC_STAT_TACHYTHERAPY_RECENT_DTM_START: NORMAL
MDC_IDC_STAT_TACHYTHERAPY_SHOCKS_ABORTED_RECENT: 0
MDC_IDC_STAT_TACHYTHERAPY_SHOCKS_ABORTED_TOTAL: 3
MDC_IDC_STAT_TACHYTHERAPY_SHOCKS_DELIVERED_RECENT: 0
MDC_IDC_STAT_TACHYTHERAPY_SHOCKS_DELIVERED_TOTAL: 5
MDC_IDC_STAT_TACHYTHERAPY_TOTAL_DTM_END: NORMAL
MDC_IDC_STAT_TACHYTHERAPY_TOTAL_DTM_START: NORMAL
POTASSIUM SERPL-SCNC: 4.1 MMOL/L (ref 3.4–5.3)
PROT SERPL-MCNC: 6.1 G/DL (ref 6.8–8.8)
SODIUM SERPL-SCNC: 137 MMOL/L (ref 133–144)

## 2021-03-22 PROCEDURE — 214N000001 HC R&B CCU UMMC

## 2021-03-22 PROCEDURE — 80076 HEPATIC FUNCTION PANEL: CPT | Performed by: STUDENT IN AN ORGANIZED HEALTH CARE EDUCATION/TRAINING PROGRAM

## 2021-03-22 PROCEDURE — 36415 COLL VENOUS BLD VENIPUNCTURE: CPT | Performed by: STUDENT IN AN ORGANIZED HEALTH CARE EDUCATION/TRAINING PROGRAM

## 2021-03-22 PROCEDURE — 250N000013 HC RX MED GY IP 250 OP 250 PS 637: Performed by: INTERNAL MEDICINE

## 2021-03-22 PROCEDURE — 250N000013 HC RX MED GY IP 250 OP 250 PS 637: Performed by: STUDENT IN AN ORGANIZED HEALTH CARE EDUCATION/TRAINING PROGRAM

## 2021-03-22 PROCEDURE — 250N000013 HC RX MED GY IP 250 OP 250 PS 637: Performed by: NURSE PRACTITIONER

## 2021-03-22 PROCEDURE — 999N000147 HC STATISTIC PT IP EVAL DEFER

## 2021-03-22 PROCEDURE — 99232 SBSQ HOSP IP/OBS MODERATE 35: CPT | Performed by: PHYSICIAN ASSISTANT

## 2021-03-22 PROCEDURE — 97110 THERAPEUTIC EXERCISES: CPT | Mod: GO | Performed by: OCCUPATIONAL THERAPIST

## 2021-03-22 PROCEDURE — 999N001017 HC STATISTIC GLUCOSE BY METER IP

## 2021-03-22 PROCEDURE — 80048 BASIC METABOLIC PNL TOTAL CA: CPT | Performed by: STUDENT IN AN ORGANIZED HEALTH CARE EDUCATION/TRAINING PROGRAM

## 2021-03-22 PROCEDURE — 250N000009 HC RX 250: Performed by: NURSE PRACTITIONER

## 2021-03-22 PROCEDURE — 85610 PROTHROMBIN TIME: CPT | Performed by: STUDENT IN AN ORGANIZED HEALTH CARE EDUCATION/TRAINING PROGRAM

## 2021-03-22 PROCEDURE — 250N000013 HC RX MED GY IP 250 OP 250 PS 637: Performed by: PHYSICIAN ASSISTANT

## 2021-03-22 PROCEDURE — 80076 HEPATIC FUNCTION PANEL: CPT | Performed by: NURSE PRACTITIONER

## 2021-03-22 PROCEDURE — 97530 THERAPEUTIC ACTIVITIES: CPT | Mod: GO | Performed by: OCCUPATIONAL THERAPIST

## 2021-03-22 PROCEDURE — 83735 ASSAY OF MAGNESIUM: CPT | Performed by: STUDENT IN AN ORGANIZED HEALTH CARE EDUCATION/TRAINING PROGRAM

## 2021-03-22 RX ORDER — LIDOCAINE 40 MG/G
CREAM TOPICAL
Status: CANCELLED | OUTPATIENT
Start: 2021-03-22

## 2021-03-22 RX ORDER — BUPROPION HYDROCHLORIDE 100 MG/1
100 TABLET, EXTENDED RELEASE ORAL 2 TIMES DAILY
Status: DISCONTINUED | OUTPATIENT
Start: 2021-03-23 | End: 2021-05-06 | Stop reason: ALTCHOICE

## 2021-03-22 RX ORDER — BUMETANIDE 2 MG/1
4 TABLET ORAL DAILY
Status: DISCONTINUED | OUTPATIENT
Start: 2021-03-23 | End: 2021-03-23

## 2021-03-22 RX ORDER — BUMETANIDE 2 MG/1
4 TABLET ORAL DAILY
Status: DISCONTINUED | OUTPATIENT
Start: 2021-03-22 | End: 2021-03-23

## 2021-03-22 RX ORDER — WARFARIN SODIUM 7.5 MG/1
7.5 TABLET ORAL
Status: COMPLETED | OUTPATIENT
Start: 2021-03-22 | End: 2021-03-22

## 2021-03-22 RX ADMIN — DICLOFENAC SODIUM 2 G: 10 GEL TOPICAL at 20:17

## 2021-03-22 RX ADMIN — ACETAMINOPHEN 650 MG: 325 TABLET, FILM COATED ORAL at 14:13

## 2021-03-22 RX ADMIN — AMIODARONE HYDROCHLORIDE 200 MG: 200 TABLET ORAL at 08:22

## 2021-03-22 RX ADMIN — POTASSIUM CHLORIDE 40 MEQ: 750 TABLET, EXTENDED RELEASE ORAL at 08:20

## 2021-03-22 RX ADMIN — MONTELUKAST 10 MG: 10 TABLET, FILM COATED ORAL at 22:28

## 2021-03-22 RX ADMIN — GABAPENTIN 300 MG: 300 CAPSULE ORAL at 08:21

## 2021-03-22 RX ADMIN — DICLOFENAC SODIUM 2 G: 10 GEL TOPICAL at 08:24

## 2021-03-22 RX ADMIN — ISOSORBIDE DINITRATE 10 MG: 10 TABLET ORAL at 08:22

## 2021-03-22 RX ADMIN — ISOSORBIDE DINITRATE 10 MG: 10 TABLET ORAL at 14:00

## 2021-03-22 RX ADMIN — POTASSIUM CHLORIDE 40 MEQ: 750 TABLET, EXTENDED RELEASE ORAL at 20:13

## 2021-03-22 RX ADMIN — CETIRIZINE HYDROCHLORIDE 10 MG: 10 TABLET, FILM COATED ORAL at 08:21

## 2021-03-22 RX ADMIN — Medication 10 MG: at 22:28

## 2021-03-22 RX ADMIN — DICLOFENAC SODIUM 2 G: 10 GEL TOPICAL at 14:02

## 2021-03-22 RX ADMIN — BUMETANIDE 5 MG: 2 TABLET ORAL at 08:20

## 2021-03-22 RX ADMIN — METHOCARBAMOL 500 MG: 500 TABLET, FILM COATED ORAL at 20:23

## 2021-03-22 RX ADMIN — HYDRALAZINE HYDROCHLORIDE 50 MG: 50 TABLET ORAL at 20:13

## 2021-03-22 RX ADMIN — DOCUSATE SODIUM 50 MG AND SENNOSIDES 8.6 MG 1 TABLET: 8.6; 5 TABLET, FILM COATED ORAL at 20:13

## 2021-03-22 RX ADMIN — FLUTICASONE FUROATE AND VILANTEROL TRIFENATATE 1 PUFF: 100; 25 POWDER RESPIRATORY (INHALATION) at 08:20

## 2021-03-22 RX ADMIN — GABAPENTIN 600 MG: 300 CAPSULE ORAL at 22:28

## 2021-03-22 RX ADMIN — HYDRALAZINE HYDROCHLORIDE 50 MG: 50 TABLET ORAL at 08:22

## 2021-03-22 RX ADMIN — METHOCARBAMOL 500 MG: 500 TABLET, FILM COATED ORAL at 14:18

## 2021-03-22 RX ADMIN — HYDRALAZINE HYDROCHLORIDE 50 MG: 50 TABLET ORAL at 14:09

## 2021-03-22 RX ADMIN — DICLOFENAC SODIUM 2 G: 10 GEL TOPICAL at 17:09

## 2021-03-22 RX ADMIN — ALLOPURINOL 300 MG: 300 TABLET ORAL at 08:22

## 2021-03-22 RX ADMIN — ANAKINRA 100 MG: 100 INJECTION, SOLUTION SUBCUTANEOUS at 10:41

## 2021-03-22 RX ADMIN — ACETAMINOPHEN 650 MG: 325 TABLET, FILM COATED ORAL at 07:39

## 2021-03-22 RX ADMIN — ACETAMINOPHEN 650 MG: 325 TABLET, FILM COATED ORAL at 20:23

## 2021-03-22 RX ADMIN — WARFARIN SODIUM 7.5 MG: 7.5 TABLET ORAL at 17:09

## 2021-03-22 RX ADMIN — ASPIRIN 81 MG CHEWABLE TABLET 81 MG: 81 TABLET CHEWABLE at 08:21

## 2021-03-22 RX ADMIN — GABAPENTIN 600 MG: 300 CAPSULE ORAL at 14:01

## 2021-03-22 RX ADMIN — BUMETANIDE 4 MG: 2 TABLET ORAL at 14:00

## 2021-03-22 RX ADMIN — UMECLIDINIUM 1 PUFF: 62.5 AEROSOL, POWDER ORAL at 08:20

## 2021-03-22 RX ADMIN — ISOSORBIDE DINITRATE 10 MG: 10 TABLET ORAL at 20:13

## 2021-03-22 RX ADMIN — BUPROPION HYDROCHLORIDE 150 MG: 150 TABLET, EXTENDED RELEASE ORAL at 08:21

## 2021-03-22 ASSESSMENT — ACTIVITIES OF DAILY LIVING (ADL)
ADLS_ACUITY_SCORE: 10
ADLS_ACUITY_SCORE: 12
ADLS_ACUITY_SCORE: 12

## 2021-03-22 ASSESSMENT — MIFFLIN-ST. JEOR: SCORE: 1765.6

## 2021-03-22 NOTE — PROGRESS NOTES
Shift Summary [id number]  Neuro: A&Ox4.   Cardiac: SR w/ 1st AVB. VS WDL. LVAD #s, site WDL. INR 2.56. Cr 1.76, trending up, Bumex 5 mg & 4 mg po this shift. K+ 4.1, Mag 2.6  Respiratory: Sating WDL on RA.  GI/: Adequate urine output.   Diet/appetite: Tolerating 3 gr Na diet. Eating well.  Activity:  Up independently, up to BR and in halls.  Pain: Medicated with PRN Robaxin and plain Tylenol, and scheduled Voltaren ointment and Gabapentin  Skin: No new deficits noted.  LDA's: LVAD, PIV  Refer to flow sheets for full assessments, labs, VS etc.  Plan: Continue with POC. Notify primary team with changes.

## 2021-03-22 NOTE — PLAN OF CARE
Status 2, E re-certified:  Mr Fernandez's Status 2,E was re-certified today with the following justification statement:      65 M with NICM who had a HM3 implanted on 6/19/2017 at destination therapy (obesity). He initially thrived after surgery with significant functional improvement. Over the last 10 months, however, his functional status has deteriorated. He has developed worsening heart failure symptoms associated with significant weight loss (50 lbs over the last year) with visible muscle wasting and worsening cardiorenal syndrome (baseline cr 1.4 now close to 2.0). Last hemodynamics: RA18, PCWP 25, CI 2.0. While hemodynamics improved with increased speed and increased afterload reduction, he continues to have extremely high diuretic requirements to maintain acceptable volume status which has resulted in worsening renal function on oral diuretics (note, no AI on echo and normal inflow and outflow velocities). In addition to his poor hemodynamics, he has developed several other significant LVAD complications.  He developed recurrent bacteremia (staph hominis) requiring prolonged IV antibiotics (last positive culture 9/2020). He also recently had several episodes of sustained VT and finally therapy for VF on 12/11/2020. As he is a large blood group O, we are asking for status 2 E given multiple LVAD complications and risk of losing his window of candidacy for heart transplantation.          Next re-certification will be due on 4-8-21.

## 2021-03-22 NOTE — PLAN OF CARE
"D: Admitted with worsening renal function and functional status 2/8. Hx includes NICM, VT s/p CRT-D, HM2 LVAD (2017). Now listed status 2D for heart transplant. Had carpal tunnel release surgery 2/18.     I: Monitored and assessed pt status. Encouraged activity and reinforced importance of fluid restriction. Do not disturb order from [id number]. PRN tylenol and scheduled lidoderm patches for shoulder pain.      A: AOx4. VSS on room air/CPAP with sleep. SR with 1st degree AVB on monitor. HM2 LVAD without alarms and numbers within normal limits. Afebrile. Endorses some shoulder pain, managed with current regimen. Voiding in adequate amounts. No BM this shift. On 3g Na diet. Drive line dsg CDI. Some edema in lower extremities (R>L). Blood sugars twice daily and HS. Appeared to sleep well between cares. Up independently in room.       BP (!) 89/70   Pulse 65   Temp 98.2  F (36.8  C) (Oral)   Resp 16   Ht 1.727 m (5' 8\")   Wt 99.4 kg (219 lb 3.2 oz)   SpO2 96%   BMI 33.33 kg/m       P: Continue to monitor and update primary team with changes/concerns. Pt is requesting to speak with provider about increasing his Wellbutrin dose.   "

## 2021-03-22 NOTE — PROGRESS NOTES
[facility]   Cardiology II Service / Advanced Heart Failure  Daily Progress Note      Patient: Jim Willingham  MRN: [id number]  Admission Date: 2/8/2021  Hospital Day # 42    Assessment and Plan: Jim Willingham is a 65 year old male with history of NICM EF 20% c/b VT s/p CRT-D s/p HMII LVAD 6/19/2017 originally intended as destination therapy 2/2 obesity however with recent weight loss now candidate for transplantation. He is admitted for worsening functional status and renal function concerning for worsening heart failure. He is now listed status 2E for transplant.    Changes today  - Due for RHC tomorrow (monthly while in the hospital awaiting transplant)  - Decrease bumex to 5mg in the morning and 4 mg in the afternoon  - PT for right rotator cuff injury. Also has tylenol, voltaren, and robaxin abailable  - Social work to see to discuss depression  - Health Psych consult for increased depression  - Will consider wellbutrin increase  - If he needs opioid for breathrough pain would prefer dilaudid to oxycodone d/t prior falls on oxycodone    Chronic systolic heart failure secondary to NICM s/p HM II LVAD. Echo 1/8/21 at 9400rpm, EF<30%m LVIDd 6.8cm, at least mildly reduced RV function, AoV closed without AI, mild-mod eccentric MR, dilated IVC without collapse. RHC 2/23 RA 7 PA 26/8(15) PCWP 6 Kee C/CO 4.6/2.2 TD CO/CI 5.5/2.6.    Stage D, NYHA Class III  ACEi/ARB contraindicated due to renal dysfunction. Hydralazine 50 mg po TID. Isordil 10 mg po TID.   BB contraindicated due to low cardiac output  Aldosterone antagonist contraindicated due to renal dysfunction  SCD prophylaxis CRT-D  Fluid status: Decrease bumex to 5/4  MAP: Goal 65-85  LDH: 342 3/19  Anticoagulation: Coumadin per pharmacy. INR- 2.36, goal 2.5-3, goal increased in setting of power spikes and elevated flows this hospitalization.   Antiplatelet: ASA 81 mg po daily  - remains on the cardiac transplant list status 2E. Persistent  escalation of diuretics in setting of progressive heart failure with refractory hypervolemia.     Carpel tunnel, bilateral s/p bilateral release. Evidence of thenar atrophy. Relief with steroid injection 11/20. Carpal tunnel release 2/18/21.  - Appreciate Ortho/Plastics consult.  - Scheduled Tylenol 650 mg po QID.   - Gabapentin 300 mg in AM, 600 mg in the afternoon, and 600 mg in the evening.     Right shoulder rotator cuff injury  - Tylenol, robaxin, Voltaren  - PT consult today     WALTER on CKD Stage III. Cr peaked at 2.22  - Cr-1.76 (1.66).     History of Afib. History of VT/VF. CHADSVASC-5.   - Continue Amiodarone 200 mg po daily.   - Coumadin as above.     Depression. Patient with long standing depression. More symptoms over the last week or so.  - Social work consult  - Health Psych consult  - May benefit from wellbutrin titration     Polyarticular Acute Gout, resolved.   - Appreciate Rheumatology consult.   - Oxycodone 5 mg q6h prn for 3 doses.   - Anakinra daily as recommended by Rheumatology.     DM type II, controlled. Symptomatic Hypoglycemia, resolved. Low Cortisol: Hgb A1C-6.2. Lantus and Novololg sliding scale insulin discontinued. Endo consult appreciated. C-peptide 13.9 an Proinsulin 18.4. Serum cortisol 7.7 3/6/21, cosyntropin stim test WNL per Endo.   -Per endo --> Fingerstick glucose might not be accurate for hypoglycemia, if patient has glucose <55 (without insulin), plasma glucose should be sent for confirmation of hypoglycemia prior to correction. While the patient is off insulin therapy, POC glucose readings above 60 are acceptable for the patient     Chronic/resolved conditions  COPD/Asthma: pta Breo Ellipta, albuterol prn.   Right sided weakness, resolved. CT head negative for acute findings. Appreciate Neuro eval.   Staph Hominis Bacteremia. No need for surveillance blood cx per transplant ID.    Rhinovirus, resolved. Bacterial bronchitis. Completed 5 day course of Cefdinir. COVID negative  "2/8. Resp PCR +rhinovirus. CXR 2/12 with no overt PNA. Stopped cefdinir 2/15. Cleared for transplant per ID. Repeat RVP negative 3/5. COVID repeated due to sinus congestion, negative. Congestion improved with transition from Claritin to Zyrtec.      FEN: 2 gram sodium diet   PROPHY:  Coumadin  LINES:  PIV   DISPO:  TBD pending cardiac transplant.   CODE STATUS: Full Code   ================================================================  Interval History/ROS: Having right shoulder pain which he attributes to an old rorator cuff injury. He also expresses ongoing fatigue and lack of motivation and feels that his depression is acting up. He would like to consider higher wellbutrin. He notes mild persistent abdominal distention and LE edema. He denies fever, chills, chest pain, palpitations, cough, nausea, vomiting, diarrhea, melena, hematochezia, and concerns at his drive line site. He is tolerating oral intake and ambulation. Mood is optomistic.    Last 24 hr care team notes reviewed.   ROS:  4 point ROS including Respiratory, CV, GI and , other than that noted in the HPI, is negative.     Medications: Reviewed in EPIC.     Physical Exam:   BP 99/73   Pulse 77   Temp 98.1  F (36.7  C) (Oral)   Resp 16   Ht 1.727 m (5' 8\")   Wt 99.6 kg (219 lb 9.6 oz)   SpO2 98%   BMI 33.39 kg/m    GENERAL: Appears alert and interacting appropriately.  HEENT: Eye symmetrical and free of discharge bilaterally. Mucous membranes moist and without lesions.  NECK: Supple and without lymphadenopathy. JVD 10-11 cm.   CV: RRR, S1S2 present with LVAD hum.   RESPIRATORY: Respirations regular, even, and unlabored. Lungs CTA throughout.   GI: Soft and non distended with normoactive bowel sounds present in all quadrants. No tenderness, rebound, guarding. No organomegaly.   EXTREMITIES: Trace bilateral LE peripheral edema. All extremities are warm and well perfused  NEUROLOGIC: Alert and interacting appropiratly. No focal deficits. "   MUSCULOSKELETAL: No joint swelling or tenderness.   SKIN: No jaundice. No rashes or lesions. LVAD drive line covered.     Data:  CMP  Recent Labs   Lab 03/22/21  0538 03/21/21  0608 03/20/21  0642 03/19/21  0548    138 139 136   POTASSIUM 4.1 4.1 4.0 3.7   CHLORIDE 105 104 103 100   CO2 26 28 27 29   ANIONGAP 6 6 9 8   * 85 94 90   BUN 52* 47* 46* 42*   CR 1.76* 1.66* 1.46* 1.45*   GFRESTIMATED 40* 43* 50* 51*   GFRESTBLACK 46* 50* 58* 59*   QAMAR 8.8 8.9 9.0 8.9   MAG 2.6* 2.4* 2.4* 2.5*   PROTTOTAL 6.1*  --   --   --    ALBUMIN 3.3*  --   --   --    BILITOTAL 0.4  --   --   --    ALKPHOS 101  --   --   --    AST 30  --   --   --    ALT 36  --   --   --      CBC  Recent Labs   Lab 03/21/21  0608 03/19/21  0548 03/17/21  0534   WBC 4.1 4.3 4.8   RBC 4.06* 4.14* 3.67*   HGB 12.0* 12.3* 10.8*   HCT 39.2* 39.1* 35.0*   MCV 97 94 95   MCH 29.6 29.7 29.4   MCHC 30.6* 31.5 30.9*   RDW 17.6* 18.0* 18.1*    194 179     INR  Recent Labs   Lab 03/22/21  0538 03/21/21  0608 03/20/21  0642 03/19/21  0548   INR 2.56* 2.36* 2.49* 2.77*       Patient discussed with Dr. Sears.      Didi Butler PA-C  [facility]

## 2021-03-22 NOTE — PLAN OF CARE
6C PT - defer    PT: Orders received, chart reviewed, discussed with OT. Orders received for chronic rotator cuff injury. OT currently following for functional activity/cardiac rehab prior to transplant. Pt IND with mobility. Discussed shoulder with OT, OT adding shoulder exercises to POC. No further IP PT needs, will complete order.

## 2021-03-22 NOTE — PROGRESS NOTES
LVAD/Transplant Social Work Services Progress Note      Date of Initial Social Work Evaluation: 2/10/2021  Collaborated with: [employer] 2 AFSANEH and pt     Data: Pt is an LVAD pt now listed status 2E for heart transplant. Pt had bilateral carpal tunnel surgery 2/20. Pt expressed to provider that he has having increased depressions with prolonged hospitalization.     Intervention: Supportive Visit   Assessment: Pt is insightful and wanting to talk about his mental health concerns. Writer normalized his feelings as he continues to wait for a heart transplant. Pt is able to identify many positive things happening in his life as he continues to be hospitalized. Is feeling sad that it appears life continues to go on for his family but he feels stagnant because he is here. Pt feels he is missing out on his great granddaughter's life and talks about how he believes that she has grown and is using new words and not always eager to talk on the phone. Pt is able to identify many positive things going on and is particularly pleased that the relationship between he and his wife has gotten stronger in particular how they communicate with each other and she is bringing in some of his favorite things from home when she visits. Pt has started to communicate with his step-son, whom he has had a strained relationship with; enjoyed a 1+ hr talk on the phone with him the other day.  Pt identifies that his spirituality is important to him and he believes God is preparing him for something bigger. We talked a bit of what some of those bigger things will be post-transplant including the recovery period, reintegrating back into the home and society as a transplant patient and not an LVAD pt. Pt expressed looking forward to doing many things after heart transplant.   He did join the virtual heart transplant support group last week and felt this was helpful to him We talked also joining LVAD virtual support group as he expressed an interest in  providing support to other LVAD pts. Encouraged him to do so as his experience and good attitude with the LVAD will be helpful to other LVAD patients.   Pt has noticed that he has been sleeping more and has not been walking. Pt has a fitness watch and talked about his goal of reaching 5000 steps per day but that at his highest while here he was at 3000 and in the last week has been 2000 steps per day. Emphasized the importance of pt continuing to exercise and to set smaller goals toward getting to 5000 steps a day. Writer suggested that he try to maintain a routine such as getting up, turning the lights on, doing his personal hygiene, exercising; all things that he would have done at home.   Education provided by SW: Coping/Mental Health, Ongoing Social Work support   Plan:    Discharge Plans in Progress: None     Barriers to d/c plan: Pt is awaiting heart transplant     Follow up Plan: Social Work to continue to provide emotional and mental health support to pt.

## 2021-03-22 NOTE — PLAN OF CARE
[id number] 3/22/2021    Patient is a 65 year old male admitted for decompensated heart failure awaiting a transplant (status 2D) with a PMH of Afib, diabetes, VT, WALTER, SOB, COPD, and gout. Patients team is Cards 2.    Neuro: A&Ox4; pupils equal and reactive  Cardiac: Sinus rhythm with 1st degree AVB; LVAD heartmate 2; VAD numbers in baseline; soft blood pressures  Respiratory: WDL; on room air; clear lung sounds; no SOB noted/reported   GI/: Uses bed side urinal; increased urine output r/t diuretics; strict I&O  Endocrine: WDL; blood sugars BID and HS  Diet/Appetite: 3 gram sodium diet  Skin: Slight edema in lower extremities; no other signs of skin breakdown (patient repositions frequently)  LDA: Right PIV; LVAD abdomen exit site  Activity: Independent in room; do not distub from [id number]  Pain: PRN Tylenol, Voltaren gel, and Robaxin for shoulder pain  Plan: Awaiting heart transplant    -Patient will be NPO at midnight tonight (3/22) for R heart cath tomorrow morning     Shanon Orellana, RN on 3/22/2021 at 3:43 PM

## 2021-03-23 LAB
ANION GAP SERPL CALCULATED.3IONS-SCNC: 4 MMOL/L (ref 3–14)
BUN SERPL-MCNC: 49 MG/DL (ref 7–30)
CALCIUM SERPL-MCNC: 8.7 MG/DL (ref 8.5–10.1)
CHLORIDE SERPL-SCNC: 104 MMOL/L (ref 94–109)
CO2 SERPL-SCNC: 28 MMOL/L (ref 20–32)
CREAT SERPL-MCNC: 1.9 MG/DL (ref 0.66–1.25)
ERYTHROCYTE [DISTWIDTH] IN BLOOD BY AUTOMATED COUNT: 17.2 % (ref 10–15)
GFR SERPL CREATININE-BSD FRML MDRD: 37 ML/MIN/{1.73_M2}
GLUCOSE BLDC GLUCOMTR-MCNC: 108 MG/DL (ref 70–99)
GLUCOSE BLDC GLUCOMTR-MCNC: 129 MG/DL (ref 70–99)
GLUCOSE BLDC GLUCOMTR-MCNC: 185 MG/DL (ref 70–99)
GLUCOSE SERPL-MCNC: 87 MG/DL (ref 70–99)
HCT VFR BLD AUTO: 36.1 % (ref 40–53)
HGB BLD-MCNC: 11.1 G/DL (ref 13.3–17.7)
HGB BLD-MCNC: 11.9 G/DL (ref 13.3–17.7)
INR PPP: 2.58 (ref 0.86–1.14)
MAGNESIUM SERPL-MCNC: 2.5 MG/DL (ref 1.6–2.3)
MCH RBC QN AUTO: 29.7 PG (ref 26.5–33)
MCHC RBC AUTO-ENTMCNC: 30.7 G/DL (ref 31.5–36.5)
MCV RBC AUTO: 97 FL (ref 78–100)
OXYHGB MFR BLD: 68 % (ref 92–100)
PLATELET # BLD AUTO: 171 10E9/L (ref 150–450)
POTASSIUM SERPL-SCNC: 4.2 MMOL/L (ref 3.4–5.3)
RBC # BLD AUTO: 3.74 10E12/L (ref 4.4–5.9)
SODIUM SERPL-SCNC: 137 MMOL/L (ref 133–144)
WBC # BLD AUTO: 4.4 10E9/L (ref 4–11)

## 2021-03-23 PROCEDURE — 250N000009 HC RX 250: Performed by: INTERNAL MEDICINE

## 2021-03-23 PROCEDURE — 99232 SBSQ HOSP IP/OBS MODERATE 35: CPT | Performed by: PHYSICIAN ASSISTANT

## 2021-03-23 PROCEDURE — C1894 INTRO/SHEATH, NON-LASER: HCPCS | Performed by: INTERNAL MEDICINE

## 2021-03-23 PROCEDURE — 4A023N6 MEASUREMENT OF CARDIAC SAMPLING AND PRESSURE, RIGHT HEART, PERCUTANEOUS APPROACH: ICD-10-PCS | Performed by: INTERNAL MEDICINE

## 2021-03-23 PROCEDURE — 250N000013 HC RX MED GY IP 250 OP 250 PS 637: Performed by: STUDENT IN AN ORGANIZED HEALTH CARE EDUCATION/TRAINING PROGRAM

## 2021-03-23 PROCEDURE — 83735 ASSAY OF MAGNESIUM: CPT | Performed by: STUDENT IN AN ORGANIZED HEALTH CARE EDUCATION/TRAINING PROGRAM

## 2021-03-23 PROCEDURE — 214N000001 HC R&B CCU UMMC

## 2021-03-23 PROCEDURE — 85610 PROTHROMBIN TIME: CPT | Performed by: STUDENT IN AN ORGANIZED HEALTH CARE EDUCATION/TRAINING PROGRAM

## 2021-03-23 PROCEDURE — 85027 COMPLETE CBC AUTOMATED: CPT | Performed by: STUDENT IN AN ORGANIZED HEALTH CARE EDUCATION/TRAINING PROGRAM

## 2021-03-23 PROCEDURE — 93451 RIGHT HEART CATH: CPT | Performed by: INTERNAL MEDICINE

## 2021-03-23 PROCEDURE — 272N000001 HC OR GENERAL SUPPLY STERILE: Performed by: INTERNAL MEDICINE

## 2021-03-23 PROCEDURE — 250N000013 HC RX MED GY IP 250 OP 250 PS 637: Performed by: PHYSICIAN ASSISTANT

## 2021-03-23 PROCEDURE — 80048 BASIC METABOLIC PNL TOTAL CA: CPT | Performed by: STUDENT IN AN ORGANIZED HEALTH CARE EDUCATION/TRAINING PROGRAM

## 2021-03-23 PROCEDURE — 999N001017 HC STATISTIC GLUCOSE BY METER IP

## 2021-03-23 PROCEDURE — 85018 HEMOGLOBIN: CPT

## 2021-03-23 PROCEDURE — 36415 COLL VENOUS BLD VENIPUNCTURE: CPT | Performed by: STUDENT IN AN ORGANIZED HEALTH CARE EDUCATION/TRAINING PROGRAM

## 2021-03-23 PROCEDURE — 250N000013 HC RX MED GY IP 250 OP 250 PS 637: Performed by: NURSE PRACTITIONER

## 2021-03-23 PROCEDURE — 82810 BLOOD GASES O2 SAT ONLY: CPT

## 2021-03-23 PROCEDURE — 250N000009 HC RX 250: Performed by: NURSE PRACTITIONER

## 2021-03-23 PROCEDURE — 250N000013 HC RX MED GY IP 250 OP 250 PS 637: Performed by: INTERNAL MEDICINE

## 2021-03-23 RX ORDER — BUMETANIDE 2 MG/1
4 TABLET ORAL DAILY
Status: DISCONTINUED | OUTPATIENT
Start: 2021-03-23 | End: 2021-03-27

## 2021-03-23 RX ORDER — BUMETANIDE 2 MG/1
4 TABLET ORAL DAILY
Status: DISCONTINUED | OUTPATIENT
Start: 2021-03-24 | End: 2021-03-29

## 2021-03-23 RX ORDER — WARFARIN SODIUM 7.5 MG/1
7.5 TABLET ORAL
Status: COMPLETED | OUTPATIENT
Start: 2021-03-23 | End: 2021-03-23

## 2021-03-23 RX ADMIN — HYDRALAZINE HYDROCHLORIDE 50 MG: 50 TABLET ORAL at 08:16

## 2021-03-23 RX ADMIN — MONTELUKAST 10 MG: 10 TABLET, FILM COATED ORAL at 21:26

## 2021-03-23 RX ADMIN — GABAPENTIN 300 MG: 300 CAPSULE ORAL at 08:15

## 2021-03-23 RX ADMIN — BUPROPION HYDROCHLORIDE 100 MG: 100 TABLET, EXTENDED RELEASE ORAL at 19:55

## 2021-03-23 RX ADMIN — ACETAMINOPHEN 650 MG: 325 TABLET, FILM COATED ORAL at 05:42

## 2021-03-23 RX ADMIN — DICLOFENAC SODIUM 2 G: 10 GEL TOPICAL at 05:43

## 2021-03-23 RX ADMIN — GABAPENTIN 600 MG: 300 CAPSULE ORAL at 21:26

## 2021-03-23 RX ADMIN — ISOSORBIDE DINITRATE 10 MG: 10 TABLET ORAL at 19:51

## 2021-03-23 RX ADMIN — UMECLIDINIUM 1 PUFF: 62.5 AEROSOL, POWDER ORAL at 08:15

## 2021-03-23 RX ADMIN — AMIODARONE HYDROCHLORIDE 200 MG: 200 TABLET ORAL at 08:16

## 2021-03-23 RX ADMIN — GABAPENTIN 600 MG: 300 CAPSULE ORAL at 14:35

## 2021-03-23 RX ADMIN — DICLOFENAC SODIUM 2 G: 10 GEL TOPICAL at 19:55

## 2021-03-23 RX ADMIN — ISOSORBIDE DINITRATE 10 MG: 10 TABLET ORAL at 14:35

## 2021-03-23 RX ADMIN — ANAKINRA 100 MG: 100 INJECTION, SOLUTION SUBCUTANEOUS at 08:19

## 2021-03-23 RX ADMIN — METHOCARBAMOL 500 MG: 500 TABLET, FILM COATED ORAL at 14:52

## 2021-03-23 RX ADMIN — FLUTICASONE FUROATE AND VILANTEROL TRIFENATATE 1 PUFF: 100; 25 POWDER RESPIRATORY (INHALATION) at 09:32

## 2021-03-23 RX ADMIN — DICLOFENAC SODIUM 2 G: 10 GEL TOPICAL at 16:33

## 2021-03-23 RX ADMIN — POTASSIUM CHLORIDE 40 MEQ: 750 TABLET, EXTENDED RELEASE ORAL at 08:17

## 2021-03-23 RX ADMIN — METHOCARBAMOL 500 MG: 500 TABLET, FILM COATED ORAL at 05:42

## 2021-03-23 RX ADMIN — Medication 10 MG: at 21:32

## 2021-03-23 RX ADMIN — ISOSORBIDE DINITRATE 10 MG: 10 TABLET ORAL at 08:16

## 2021-03-23 RX ADMIN — WARFARIN SODIUM 7.5 MG: 7.5 TABLET ORAL at 18:07

## 2021-03-23 RX ADMIN — ASPIRIN 81 MG CHEWABLE TABLET 81 MG: 81 TABLET CHEWABLE at 08:16

## 2021-03-23 RX ADMIN — ACETAMINOPHEN 650 MG: 325 TABLET, FILM COATED ORAL at 19:51

## 2021-03-23 RX ADMIN — CETIRIZINE HYDROCHLORIDE 10 MG: 10 TABLET, FILM COATED ORAL at 08:17

## 2021-03-23 RX ADMIN — DOCUSATE SODIUM 50 MG AND SENNOSIDES 8.6 MG 1 TABLET: 8.6; 5 TABLET, FILM COATED ORAL at 19:51

## 2021-03-23 RX ADMIN — HYDRALAZINE HYDROCHLORIDE 50 MG: 50 TABLET ORAL at 19:51

## 2021-03-23 RX ADMIN — HYDRALAZINE HYDROCHLORIDE 50 MG: 50 TABLET ORAL at 14:35

## 2021-03-23 RX ADMIN — POTASSIUM CHLORIDE 40 MEQ: 750 TABLET, EXTENDED RELEASE ORAL at 19:51

## 2021-03-23 RX ADMIN — ALLOPURINOL 300 MG: 300 TABLET ORAL at 08:16

## 2021-03-23 RX ADMIN — BUMETANIDE 4 MG: 2 TABLET ORAL at 14:35

## 2021-03-23 RX ADMIN — BUPROPION HYDROCHLORIDE 100 MG: 100 TABLET, EXTENDED RELEASE ORAL at 08:17

## 2021-03-23 RX ADMIN — BUMETANIDE 3 MG: 2 TABLET ORAL at 08:16

## 2021-03-23 ASSESSMENT — ACTIVITIES OF DAILY LIVING (ADL)
ADLS_ACUITY_SCORE: 12

## 2021-03-23 ASSESSMENT — MIFFLIN-ST. JEOR: SCORE: 1786.46

## 2021-03-23 NOTE — PROGRESS NOTES
[facility]   Cardiology II Service / Advanced Heart Failure  Daily Progress Note      Patient: Jim Willingham  MRN: [id number]  Admission Date: 2/8/2021  Hospital Day # 43    Assessment and Plan: Jim Willingham is a 65 year old male with history of NICM EF 20% c/b VT s/p CRT-D s/p HMII LVAD 6/19/2017 originally intended as destination therapy 2/2 obesity however with recent weight loss now candidate for transplantation. He is admitted for worsening functional status and renal function concerning for worsening heart failure. He is now listed status 2E for transplant.    Changes today  - F/u RHC today  - Bumex 4 mg PO BID  - PT for right rotator cuff injury. Also has tylenol, voltaren, and robaxin abailable  - Increased Wellbutrin SR to 100 mg BID  - Health Psych consult for increased depression  - If he needs opioid for breathrough pain would prefer dilaudid to oxycodone d/t prior falls on oxycodone    Chronic systolic heart failure secondary to NICM s/p HM II LVAD. Echo 1/8/21 at 9400rpm, EF<30%m LVIDd 6.8cm, at least mildly reduced RV function, AoV closed without AI, mild-mod eccentric MR, dilated IVC without collapse. RHC 2/23 RA 7 PA 26/8(15) PCWP 6 Kee C/CO 4.6/2.2 TD CO/CI 5.5/2.6.    Stage D, NYHA Class III  ACEi/ARB contraindicated due to renal dysfunction. Hydralazine 50 mg po TID. Isordil 10 mg po TID.   BB contraindicated due to low cardiac output  Aldosterone antagonist contraindicated due to renal dysfunction  SCD prophylaxis CRT-D  Fluid status: Change bumex to 4 mg PO BID, goal to Keep weight aroud 219, euvolemic on RHC today  MAP: Goal 65-85  LDH: 342 3/19, check weekly  Anticoagulation: Coumadin per pharmacy. INR- 2.58, goal 2.5-3, goal increased in setting of power spikes and elevated flows this hospitalization.   Antiplatelet: ASA 81 mg po daily  - remains on the cardiac transplant list status 2E. Persistent escalation of diuretics in setting of progressive heart failure with  refractory hypervolemia.     Carpel tunnel, bilateral s/p bilateral release. Evidence of thenar atrophy. Relief with steroid injection 11/20. Carpal tunnel release 2/18/21.  - Appreciate Ortho/Plastics consult.  - Scheduled Tylenol 650 mg po QID.   - Gabapentin 300 mg in AM, 600 mg in the afternoon, and 600 mg in the evening.     Right shoulder rotator cuff injury  - Tylenol, robaxin, Voltaren  - PT consult today     WALTER on CKD Stage III. Cr peaked at 2.22  - Cr-1.90 (1.76).     History of Afib. History of VT/VF. CHADSVASC-5.   - Continue Amiodarone 200 mg po daily.   - Coumadin as above.     Depression. Patient with long standing depression. More symptoms over the last week or so.  - Social work consult  - Health Psych consult  - May benefit from wellbutrin titration     Polyarticular Acute Gout, resolved.   - Appreciate Rheumatology consult. .   - Anakinra daily as recommended by Rheumatology.     DM type II, controlled. Symptomatic Hypoglycemia, resolved. Low Cortisol: Hgb A1C-6.2. Lantus and Novololg sliding scale insulin discontinued. Endo consult appreciated. C-peptide 13.9 an Proinsulin 18.4. Serum cortisol 7.7 3/6/21, cosyntropin stim test WNL per Endo.   -Per endo --> Fingerstick glucose might not be accurate for hypoglycemia, if patient has glucose <55 (without insulin), plasma glucose should be sent for confirmation of hypoglycemia prior to correction. While the patient is off insulin therapy, POC glucose readings above 60 are acceptable for the patient     Chronic/resolved conditions  COPD/Asthma: pta Breo Ellipta, albuterol prn.   Right sided weakness, resolved. CT head negative for acute findings. Appreciate Neuro eval.   Staph Hominis Bacteremia. No need for surveillance blood cx per transplant ID.    Rhinovirus, resolved. Bacterial bronchitis. Completed 5 day course of Cefdinir. COVID negative 2/8. Resp PCR +rhinovirus. CXR 2/12 with no overt PNA. Stopped cefdinir 2/15. Cleared for transplant per  "ID. Repeat RVP negative 3/5. COVID repeated due to sinus congestion, negative. Congestion improved with transition from Claritin to Zyrtec.      FEN: 2 gram sodium diet   PROPHY:  Coumadin  LINES:  PIV   DISPO:  TBD pending cardiac transplant.   CODE STATUS: Full Code   ================================================================  Interval History/ROS: Having right shoulder pain which he attributes to an old rorator cuff injury. Significant improvement from robaxin and volteran.  He notes mild persistent abdominal distention and LE edema. He denies fever, chills, chest pain, palpitations, cough, nausea, vomiting, diarrhea, melena, hematochezia, and concerns at his drive line site. He is tolerating oral intake and ambulation.     Last 24 hr care team notes reviewed.   ROS:  4 point ROS including Respiratory, CV, GI and , other than that noted in the HPI, is negative.     Medications: Reviewed in EPIC.     Physical Exam:   BP 94/82 (BP Location: Right arm)   Pulse 65   Temp 97.5  F (36.4  C) (Oral)   Resp 20   Ht 1.727 m (5' 8\")   Wt 101.7 kg (224 lb 3.2 oz)   SpO2 99%   BMI 34.09 kg/m    GENERAL: Appears alert and interacting appropriately.  HEENT: Eye symmetrical and free of discharge bilaterally. Mucous membranes moist and without lesions.  NECK: Supple and without lymphadenopathy. JVD 10-11 cm.   CV: RRR, S1S2 present with LVAD hum.   RESPIRATORY: Respirations regular, even, and unlabored. Lungs CTA throughout.   GI: Soft and non distended with normoactive bowel sounds present in all quadrants. No tenderness, rebound, guarding. No organomegaly.   EXTREMITIES: Trace bilateral LE peripheral edema. All extremities are warm and well perfused  NEUROLOGIC: Alert and interacting appropiratly. No focal deficits.   MUSCULOSKELETAL: No joint swelling or tenderness.   SKIN: No jaundice. No rashes or lesions. LVAD drive line covered.     Data:  CMP  Recent Labs   Lab 03/23/21  0539 03/22/21  0538 03/21/21  0608 " 03/20/21  0642    137 138 139   POTASSIUM 4.2 4.1 4.1 4.0   CHLORIDE 104 105 104 103   CO2 28 26 28 27   ANIONGAP 4 6 6 9   GLC 87 116* 85 94   BUN 49* 52* 47* 46*   CR 1.90* 1.76* 1.66* 1.46*   GFRESTIMATED 37* 40* 43* 50*   GFRESTBLACK 42* 46* 50* 58*   QAMAR 8.7 8.8 8.9 9.0   MAG 2.5* 2.6* 2.4* 2.4*   PROTTOTAL  --  6.1*  --   --    ALBUMIN  --  3.3*  --   --    BILITOTAL  --  0.4  --   --    ALKPHOS  --  101  --   --    AST  --  30  --   --    ALT  --  36  --   --      CBC  Recent Labs   Lab 03/23/21  1035 03/23/21  0539 03/21/21  0608 03/19/21  0548 03/17/21  0534   WBC  --  4.4 4.1 4.3 4.8   RBC  --  3.74* 4.06* 4.14* 3.67*   HGB 11.9* 11.1* 12.0* 12.3* 10.8*   HCT  --  36.1* 39.2* 39.1* 35.0*   MCV  --  97 97 94 95   MCH  --  29.7 29.6 29.7 29.4   MCHC  --  30.7* 30.6* 31.5 30.9*   RDW  --  17.2* 17.6* 18.0* 18.1*   PLT  --  171 187 194 179     INR  Recent Labs   Lab 03/23/21  0539 03/22/21  0538 03/21/21  0608 03/20/21  0642   INR 2.58* 2.56* 2.36* 2.49*       Patient discussed with Dr. Sears.      Didi Butler PA-C  [facility]

## 2021-03-23 NOTE — PROGRESS NOTES
"D: Pt admitted 2/8 for worsening functional status and renal function concerning for worsening heart failure. Now listed status 2D on heart transplant list. PMH includes COPD, CKD, DM2, NICM, VT s/p CRT-D s/p LVAD HM2 2017 as destination therapy. Pt now eligible for transplant due to weight loss.  ?  A/I : Monitored vitals and assessed pt status. A0x4. VSS. NSR, occasionally paced. 4 NST runs (4-6 beats) between 1330 and 1400.  Afebrile. Urinating adequately. Last BM was yesterday. Denies chest pain, palpitations, SOB, nausea, dizziness. Tolerated RHC today. PRN robaxin and scheduled Voltaren gel used for right shoulder pain. Patient had episode when up ambulating with OT this afternoon when he started feeling lightheaded and \"outta sorts\", and stumbling. Patient assisted back to room and VSS except SBP 70s, but MAP are 68-70s, no overt signs of stroke on exam, . Cards 2 notified and came to assess patient, provider states neuro exam was normal and patient able to ambulate in room without previous symptoms. Provider recommended patient should try ambulating outside room at some point tonight with staff assistance to see if able to tolerate. Patient educated that he should use call light when getting up until sure his symptoms have subsided.  ?  P: Continue to monitor Pt status and report changes to treatment team.  "

## 2021-03-23 NOTE — PLAN OF CARE
Pt admitted 2/8 for worsening functional status and renal function concerning for worsening heart failure. Now listed status 2D on heart transplant list. PMH includes COPD, CKD, DM2, NICM, VT s/p CRT-D s/p LVAD HM2 2017 as destination therapy. Pt now eligible for transplant due to weight loss.     Neuro: A&O x 4. Calls appropriately. Pleasant affect. Denies headache. Afebrile. Slept well overnight with PRN melatonin.   Cardiac: 1st degree AV block 60's-70's. MAP's 70's. LVAD numbers WNL. No alarms.   Respiratory: Sating well on RA. LS clear. Denies RESENDIZ.  GI/: Voiding adequately into bedside urinal. Last BM 2/21. BS active.  Endocrine: HS BS 58. Gave pt orange juice and ice cream and recheck was 107.   Diet/Appetite: NPO for RHC today.   Skin: WNL.   LDA: R PIV SL  Activity: Independent. Walked halls in the evening.   Pain: R shoulder pain controlled with PRN Tylenol, Robaxin, and scheduled Voltaren.     Plan: Continue to monitor and alert team with any changes or concerns.

## 2021-03-24 ENCOUNTER — APPOINTMENT (OUTPATIENT)
Dept: OCCUPATIONAL THERAPY | Facility: CLINIC | Age: 66
DRG: 001 | End: 2021-03-24
Attending: INTERNAL MEDICINE
Payer: COMMERCIAL

## 2021-03-24 LAB
ANION GAP SERPL CALCULATED.3IONS-SCNC: 8 MMOL/L (ref 3–14)
BUN SERPL-MCNC: 47 MG/DL (ref 7–30)
CALCIUM SERPL-MCNC: 8.6 MG/DL (ref 8.5–10.1)
CHLORIDE SERPL-SCNC: 105 MMOL/L (ref 94–109)
CO2 SERPL-SCNC: 25 MMOL/L (ref 20–32)
CREAT SERPL-MCNC: 1.69 MG/DL (ref 0.66–1.25)
GFR SERPL CREATININE-BSD FRML MDRD: 42 ML/MIN/{1.73_M2}
GLUCOSE BLDC GLUCOMTR-MCNC: 199 MG/DL (ref 70–99)
GLUCOSE BLDC GLUCOMTR-MCNC: 82 MG/DL (ref 70–99)
GLUCOSE BLDC GLUCOMTR-MCNC: 93 MG/DL (ref 70–99)
GLUCOSE SERPL-MCNC: 90 MG/DL (ref 70–99)
INR PPP: 2.59 (ref 0.86–1.14)
MAGNESIUM SERPL-MCNC: 2.6 MG/DL (ref 1.6–2.3)
POTASSIUM SERPL-SCNC: 4.1 MMOL/L (ref 3.4–5.3)
SODIUM SERPL-SCNC: 138 MMOL/L (ref 133–144)

## 2021-03-24 PROCEDURE — 250N000009 HC RX 250: Performed by: NURSE PRACTITIONER

## 2021-03-24 PROCEDURE — 250N000013 HC RX MED GY IP 250 OP 250 PS 637: Performed by: NURSE PRACTITIONER

## 2021-03-24 PROCEDURE — 93750 INTERROGATION VAD IN PERSON: CPT | Performed by: PHYSICIAN ASSISTANT

## 2021-03-24 PROCEDURE — 250N000013 HC RX MED GY IP 250 OP 250 PS 637: Performed by: STUDENT IN AN ORGANIZED HEALTH CARE EDUCATION/TRAINING PROGRAM

## 2021-03-24 PROCEDURE — 250N000013 HC RX MED GY IP 250 OP 250 PS 637: Performed by: INTERNAL MEDICINE

## 2021-03-24 PROCEDURE — 999N001017 HC STATISTIC GLUCOSE BY METER IP

## 2021-03-24 PROCEDURE — 36415 COLL VENOUS BLD VENIPUNCTURE: CPT | Performed by: STUDENT IN AN ORGANIZED HEALTH CARE EDUCATION/TRAINING PROGRAM

## 2021-03-24 PROCEDURE — 85610 PROTHROMBIN TIME: CPT | Performed by: STUDENT IN AN ORGANIZED HEALTH CARE EDUCATION/TRAINING PROGRAM

## 2021-03-24 PROCEDURE — 83735 ASSAY OF MAGNESIUM: CPT | Performed by: STUDENT IN AN ORGANIZED HEALTH CARE EDUCATION/TRAINING PROGRAM

## 2021-03-24 PROCEDURE — 80048 BASIC METABOLIC PNL TOTAL CA: CPT | Performed by: STUDENT IN AN ORGANIZED HEALTH CARE EDUCATION/TRAINING PROGRAM

## 2021-03-24 PROCEDURE — 97110 THERAPEUTIC EXERCISES: CPT | Mod: GO

## 2021-03-24 PROCEDURE — 90791 PSYCH DIAGNOSTIC EVALUATION: CPT | Performed by: PSYCHOLOGIST

## 2021-03-24 PROCEDURE — 214N000001 HC R&B CCU UMMC

## 2021-03-24 PROCEDURE — 99232 SBSQ HOSP IP/OBS MODERATE 35: CPT | Mod: 24 | Performed by: PHYSICIAN ASSISTANT

## 2021-03-24 PROCEDURE — 250N000013 HC RX MED GY IP 250 OP 250 PS 637: Performed by: PHYSICIAN ASSISTANT

## 2021-03-24 RX ORDER — WARFARIN SODIUM 7.5 MG/1
7.5 TABLET ORAL
Status: COMPLETED | OUTPATIENT
Start: 2021-03-24 | End: 2021-03-24

## 2021-03-24 RX ADMIN — AMIODARONE HYDROCHLORIDE 200 MG: 200 TABLET ORAL at 08:22

## 2021-03-24 RX ADMIN — BUMETANIDE 4 MG: 2 TABLET ORAL at 14:07

## 2021-03-24 RX ADMIN — DICLOFENAC SODIUM 2 G: 10 GEL TOPICAL at 16:55

## 2021-03-24 RX ADMIN — HYDRALAZINE HYDROCHLORIDE 50 MG: 50 TABLET ORAL at 14:07

## 2021-03-24 RX ADMIN — ISOSORBIDE DINITRATE 10 MG: 10 TABLET ORAL at 21:01

## 2021-03-24 RX ADMIN — MONTELUKAST 10 MG: 10 TABLET, FILM COATED ORAL at 22:14

## 2021-03-24 RX ADMIN — FLUTICASONE FUROATE AND VILANTEROL TRIFENATATE 1 PUFF: 100; 25 POWDER RESPIRATORY (INHALATION) at 08:24

## 2021-03-24 RX ADMIN — METHOCARBAMOL 500 MG: 500 TABLET, FILM COATED ORAL at 22:14

## 2021-03-24 RX ADMIN — ACETAMINOPHEN 650 MG: 325 TABLET, FILM COATED ORAL at 22:14

## 2021-03-24 RX ADMIN — ACETAMINOPHEN 650 MG: 325 TABLET, FILM COATED ORAL at 16:54

## 2021-03-24 RX ADMIN — DOCUSATE SODIUM 50 MG AND SENNOSIDES 8.6 MG 2 TABLET: 8.6; 5 TABLET, FILM COATED ORAL at 08:31

## 2021-03-24 RX ADMIN — POTASSIUM CHLORIDE 40 MEQ: 750 TABLET, EXTENDED RELEASE ORAL at 21:00

## 2021-03-24 RX ADMIN — DICLOFENAC SODIUM 2 G: 10 GEL TOPICAL at 08:36

## 2021-03-24 RX ADMIN — BUMETANIDE 4 MG: 2 TABLET ORAL at 08:21

## 2021-03-24 RX ADMIN — ALLOPURINOL 300 MG: 300 TABLET ORAL at 08:21

## 2021-03-24 RX ADMIN — ASPIRIN 81 MG CHEWABLE TABLET 81 MG: 81 TABLET CHEWABLE at 08:22

## 2021-03-24 RX ADMIN — HYDRALAZINE HYDROCHLORIDE 50 MG: 50 TABLET ORAL at 21:01

## 2021-03-24 RX ADMIN — Medication: at 22:14

## 2021-03-24 RX ADMIN — WARFARIN SODIUM 7.5 MG: 7.5 TABLET ORAL at 18:08

## 2021-03-24 RX ADMIN — DICLOFENAC SODIUM 2 G: 10 GEL TOPICAL at 12:13

## 2021-03-24 RX ADMIN — Medication 10 MG: at 22:13

## 2021-03-24 RX ADMIN — UMECLIDINIUM 1 PUFF: 62.5 AEROSOL, POWDER ORAL at 08:24

## 2021-03-24 RX ADMIN — ISOSORBIDE DINITRATE 10 MG: 10 TABLET ORAL at 14:09

## 2021-03-24 RX ADMIN — ISOSORBIDE DINITRATE 10 MG: 10 TABLET ORAL at 08:22

## 2021-03-24 RX ADMIN — POTASSIUM CHLORIDE 40 MEQ: 750 TABLET, EXTENDED RELEASE ORAL at 08:22

## 2021-03-24 RX ADMIN — METHOCARBAMOL 500 MG: 500 TABLET, FILM COATED ORAL at 08:31

## 2021-03-24 RX ADMIN — HYDRALAZINE HYDROCHLORIDE 50 MG: 50 TABLET ORAL at 08:23

## 2021-03-24 RX ADMIN — GABAPENTIN 600 MG: 300 CAPSULE ORAL at 22:14

## 2021-03-24 RX ADMIN — BUPROPION HYDROCHLORIDE 100 MG: 100 TABLET, EXTENDED RELEASE ORAL at 22:13

## 2021-03-24 RX ADMIN — BUPROPION HYDROCHLORIDE 100 MG: 100 TABLET, EXTENDED RELEASE ORAL at 08:21

## 2021-03-24 RX ADMIN — CETIRIZINE HYDROCHLORIDE 10 MG: 10 TABLET, FILM COATED ORAL at 08:22

## 2021-03-24 RX ADMIN — GABAPENTIN 600 MG: 300 CAPSULE ORAL at 14:06

## 2021-03-24 RX ADMIN — ANAKINRA 100 MG: 100 INJECTION, SOLUTION SUBCUTANEOUS at 08:36

## 2021-03-24 RX ADMIN — DICLOFENAC SODIUM 2 G: 10 GEL TOPICAL at 21:01

## 2021-03-24 RX ADMIN — ACETAMINOPHEN 650 MG: 325 TABLET, FILM COATED ORAL at 08:31

## 2021-03-24 RX ADMIN — GABAPENTIN 300 MG: 300 CAPSULE ORAL at 08:23

## 2021-03-24 ASSESSMENT — ACTIVITIES OF DAILY LIVING (ADL)
ADLS_ACUITY_SCORE: 12

## 2021-03-24 ASSESSMENT — MIFFLIN-ST. JEOR: SCORE: 1771.95

## 2021-03-24 NOTE — PROGRESS NOTES
The patient's HeartMate LVAD was interrogated 3/24/2021  * Speed 9600 rpm   * Pulsatility index 4.4   * Power 6.7 Manuel   * Flow 6.4 L/minute   Fluid status: euvolemic   Alarms were reviewed, and notable for low voltage alerts at 8pm last night, this was during a switch from battery to wall power  The driveline exit site was inspected, c/d/i.   All external components were inspected and showed no evidence of damage or malfunction, none replaced.   No changes to VAD settings made

## 2021-03-24 NOTE — PLAN OF CARE
D: Admitted 2/08 for worsening functional status and renal function concerning for worsening heart failure. Now listed status 2E on heart transplant list.   Hx: COPD, CKD3, DM2, NICM (EF 20%), VT/VF s/p CRT-D s/p LVAD HM2 2017 initially as destination therapy d/t obesity, but pt now eligible for transplant due to weight loss.  ?  I: Monitored vitals and assessed pt status.   Changed: pt reported ~5 min of blurred vision in R eye, only other symptom was a headache. VSS. Neuros intact with pupils PERRLA. Pt given tylenol for headache and told to notify RN if blurred vision returns or if headache does not improve/worsens. Provider notified. Will continue to monitor.   PRN: tylenol x1, melatonin at HS     A: A0x4. VSS on RA. Tele shows SR, rate 60-70s. HM2 LVAD without alarms, numbers WNL, and dressing CDI. Afebrile. Pt reported pain in R shoulder d/t rotator cuff injury, relieved by prn tylenol, robaxin, and scheduled Voltaren cream. Pt declined lidocaine patches, reports they do not work for him. Urinating adequately. Up independently. Blood sugars twice daily and HS. Do not disturb orders [id number]. Slept between cares.      P: Continue to monitor Pt status and report changes to Cards 2.     [phone]  Rajani Holman RN on 3/24/2021 at 6:08 AM

## 2021-03-24 NOTE — CONSULTS
This telehealth service is appropriate and effective for delivering services in light of the necessity for social distancing to mitigate the COVID-19 epidemic and for conservation of PPE.     Patient has agreed to receiving telehealth services after being informed about it: Yes    Patient prefers video invitation/information to be sent by:   email    Time service started: 4:10 PM  Time service ended: 4:46p    Mode of transmission: Telephone    Location of originating:  [facility]    Distance site:  Home office of provider for [facility]    The patient has been notified that:  Video visits will be conducted via a call with their psychologist to provide the care they need with a video conversation. Video visits may be billed at different rates depending on insurance coverage.  Patients are advised to please contact their insurance provider with any questions about their health insurance coverage. If during the course of a call the psychologist feels a video visit is not appropriate, patients will not be charged for this service.      Health Psychology                  Clinic    Department of Medicine  Deepika Cook, PhD, LP [phone]                          [facility]  [facility] Sagrario Feng, PhD, LP [phone]                  [address] [place] [address] Mail Code 741   Parviz Hobbs, PhD, ABPP, LP [phone]     [facility], [address],  Rajani Bejarano,  PhD, LP [phone]            [place] [address] Jennifer Bahena, PhD, LP [phone]     Confidential Summary of Inpatient Health Psychology Consultation*    Date of Service:  03/24/21    Referring Provider:  Dr. Butler    Reason for Consultation:  Coping with prolonged hosptialization    Sources of Information:  Information was obtained from a clinical interview with the patient and review of medical record.    History of Present Illness:  Jim Willingham is a 65 year old male per  "EMR \"admitted 2/8 for worsening functional status and renal function concerning for worsening heart failure. Now listed status 2D on heart transplant list. PMH includes COPD, CKD, DM2, NICM, VT s/p CRT-D s/p LVAD HM2 2017 as destination therapy. Pt now eligible for transplant due to weight loss.\"     Mr. Willingham reported he has been in hospital x 6 weeks waiting on a heart; in the beginning of this hospitalization he had good energy but in the last week all he has wanted to do was sleep. Calls himself a a very spiritual person and feels as if Dhara is trying to create doubt in his mind about transplant. She has a great granddaughter who is 7 years old who he is caregiver for, and separation from her weighs on him. He views this as God trying to teach him patience and this is what he has to go through to get to where he wants to go. He also reports illness teaching him in the past about humility.     He magan by listening to music, doing his morning Bible devotions, video chatting with his granddaughter, and finding things that he is grateful for.  He also reports envisioning things that will come from his heart transplant such as being able to bike with his granddaughter and barbecue.  He is finding benefit in speaking with his  and the recent increase in Wellbutrin.  He has recently started 3-day a week cardiac rehabilitation and surprised himself with how much she was able to exercise.    Medical History:  See below lists for past medical history, past surgical history, and current medications    Past Medical History:   Diagnosis Date     Bariatric surgery status 2003     Benign essential hypertension 05/11/2017     Bilateral carpal tunnel syndrome 11/02/2020     Cardiomyopathy, unspecified (H) 05/08/2017     CKD (chronic kidney disease) stage 3, GFR 30-59 ml/min 05/11/2017     COPD (chronic obstructive pulmonary disease) (H) 11/02/2020     Depression 05/11/2017     Diabetes mellitus (H) 1995     Yolie " arthropathy, chronic, without tophi 11/02/2020     H/O gastric bypass 05/11/2017     ICD (implantable cardioverter-defibrillator), biventricular, in situ 05/11/2017     LVAD (left ventricular assist device) present (H)      Major depression, recurrent, chronic (H) 11/02/2020     NICM (nonischemic cardiomyopathy) (H)/ EF 20% 05/11/2017    ECHO: LVEDd. 7.66 cm, Restrictive pattern , Severe mitral valve regurgitation     CECILIA (obstructive sleep apnea) 05/11/2017     Paroxysmal atrial fibrillation (H) 05/11/2017     Paroxysmal VT (H) 05/11/2017     Rotator cuff tear arthropathy of both shoulders 11/02/2020     Uncomplicated asthma      Vitamin B12 deficiency (non anemic) 05/11/2017       Past Surgical History:   Procedure Laterality Date     ANESTHESIA CARDIOVERSION N/A 5/11/2020    Procedure: ANESTHESIA, FOR CARDIOVERSION @1100;  Surgeon: GENERIC ANESTHESIA PROVIDER;  Location: [facility] OR     CV RIGHT HEART CATH MEASUREMENTS RECORDED N/A 7/24/2019    Procedure: CV RIGHT HEART CATH;  Surgeon: Renu Sears MD;  Location: [facility] HEART CARDIAC CATH LAB     CV RIGHT HEART CATH MEASUREMENTS RECORDED N/A 8/5/2020    Procedure: CV RIGHT HEART CATH;  Surgeon: Nicola Seth MD;  Location: [facility] HEART [facility]ATH LAB     CV RIGHT HEART CATH MEASUREMENTS RECORDED N/A 1/7/2021    Procedure: CV RIGHT HEART CATH;  Surgeon: Jac Dover MD;  [facility]     CV RIGHT HEART CATH MEASUREMENTS RECORDED N/A 2/23/2021    Procedure: Heart Cath Right Heart Cath;  Surgeon: Jeffrey Gibson MD;  [facility]     CV RIGHT HEART CATH MEASUREMENTS RECORDED N/A 3/23/2021    Procedure: Heart Cath Right Heart Cath. request for 3/23;  Surgeon: Jeffrey Gibson MD;  Location:  HEART CARDIAC CATH LAB     GI SURGERY  2003    Sylvester en Y     INSERT VENTRICULAR ASSIST DEVICE LEFT (HEARTMATE II) N/A 6/19/2017    Procedure: INSERT VENTRICULAR ASSIST DEVICE LEFT (HEARTMATE II);   Median Sternotomy Heartmate II Left Ventricular Assist Device Insertion on Pump Oxygenator;  Surgeon: Ronnie Quigley MD;  Location: [facility] OR     ORTHOPEDIC SURGERY  1994    right knee wired     PICC DOUBLE LUMEN PLACEMENT Right 09/23/2020    5FR PICC DL. Length-43cm (1cm out).     RELEASE CARPAL TUNNEL BILATERAL Bilateral 2/18/2021    Procedure: Bilateral carpal tunnel release;  Surgeon: Jermaine Brand MD;  Location: [facility] OR       Current Facility-Administered Medications   Medication     acetaminophen (TYLENOL) tablet 650 mg     albuterol (PROAIR HFA/PROVENTIL HFA/VENTOLIN HFA) 108 (90 Base) MCG/ACT inhaler 2 puff     allopurinol (ZYLOPRIM) tablet 300 mg     alum & mag hydroxide-simethicone (MAALOX) suspension 30 mL     amiodarone (PACERONE) tablet 200 mg     anakinra (KINERET) subcutaneous injection 100 mg     aspirin (ASA) chewable tablet 81 mg     bumetanide (BUMEX) tablet 4 mg     bumetanide (BUMEX) tablet 4 mg     buPROPion (WELLBUTRIN SR) 12 hr tablet 100 mg     cetirizine (zyrTEC) tablet 10 mg     glucose gel 15-30 g    Or     dextrose 50 % injection 25-50 mL    Or     glucagon injection 1 mg     diclofenac (VOLTAREN) 1 % topical gel 2 g     fluticasone-vilanterol (BREO ELLIPTA) 100-25 MCG/INH inhaler 1 puff     gabapentin (NEURONTIN) capsule 300 mg     gabapentin (NEURONTIN) capsule 600 mg     hydrALAZINE (APRESOLINE) tablet 50 mg     isosorbide dinitrate (ISORDIL) tablet 10 mg     Lidocaine (LIDOCARE) 4 % Patch 2 patch    And     lidocaine patch in PLACE     medication instruction     melatonin tablet 10 mg     menthol (Topical Analgesic) 2.5% (BENGAY VANISHIN SCENT) 2.5 % topical gel     methocarbamol (ROBAXIN) tablet 500 mg     montelukast (SINGULAIR) tablet 10 mg     naloxone (NARCAN) injection 0.2 mg    Or     naloxone (NARCAN) injection 0.4 mg    Or     naloxone (NARCAN) injection 0.2 mg    Or     naloxone (NARCAN) injection 0.4 mg     nitroGLYcerin (NITROSTAT) sublingual tablet 0.4 mg     Patient is  already receiving anticoagulation with heparin, enoxaparin (LOVENOX), warfarin (COUMADIN)  or other anticoagulant medication     polyethylene glycol (MIRALAX) Packet 17 g     potassium chloride ER (KLOR-CON M) CR tablet 40 mEq     Reason ACE/ARB/ARNI order not selected     senna-docusate (SENOKOT-S/PERICOLACE) 8.6-50 MG per tablet 1 tablet     senna-docusate (SENOKOT-S/PERICOLACE) 8.6-50 MG per tablet 2 tablet     sodium chloride (PF) 0.9% PF flush 3 mL     sodium chloride (PF) 0.9% PF flush 3 mL     umeclidinium (INCRUSE ELLIPTA) 62.5 MCG/INH inhaler 1 puff     warfarin ANTICOAGULANT (COUMADIN) tablet 7.5 mg     Warfarin Therapy Reminder (Check START DATE - warfarin may be starting in the FUTURE)         Psychiatric History:  Significant for depression per EMR.    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  Is  and his relationship with his wife has recently improved.    Mental Status/Interview:  Appearance/Behavior/Orientation: Alert and oriented to person, place, time, and situation.   Cooperation/Reliability: Patient appeared to honestly respond to questions about psychosocial functioning and is deemed a reliable historian.   Cognition/Memory/Judgment:  Not formally assessed, yet no difficulties apparent upon interview. Fund of knowledge consistent with age, level of education, and life experience. Abstract reasoning appropriate, no difficulties with judgment apparent.  Speech/Language: Speech was clear, logical and coherent, of normal rate, rhythm and volume.   Thought Content/Form:  Appropriate to interview and situation. Overall logical and organized.   Mood/Affect:Mood dysphoric; affect was mood congruent.  Insight/Motivation:  Appropriate to situation.     Impression:  Mr. Willingham is a 65-year-old man with significant cardiac history hospitalized awaiting heart transplant.  He reports situational depression with minimal anxiety.  His [employer]ism willa is a significant source  of support for him.  Reports the most challenged with a growing sense of fatigue that has made prehabilitation efforts challenging.  Has benefited from cardiac rehab scheduled.    Diagnosis:  Adjustment disorder with depressed mood    Recommendation/Plan:  Recommended follow-up with health psychology once a week during his hospitalization.  Discussed cognitive and behavioral interventions today such as journaling for an emotional outlet, continuing to search for use of gratitude practice, and usually visualization of the valued activities he hopes to get back to following transplant as motivation to participate in cardiac rehabilitation.  He found all of these ideas to be helpful and is interested in ongoing follow-up.     Rajani Bejarano, PhD,   Clinical Health Psychologist  Pager: [phone]    *In accordance with the Rules of the [place] Board of [employer], it is noted that psychological descriptions and scientific procedures underlying psychological evaluations have limitations.  Absolute predictions cannot be made based on information in this report.    This note was completed using Dragon voice recognition software.  Although reviewed after completion, some word and grammatical errors may occur.

## 2021-03-24 NOTE — PROGRESS NOTES
[facility]   Cardiology II Service / Advanced Heart Failure  Daily Progress Note      Patient: Jim Willingham  MRN: [id number]  Admission Date: 2/8/2021  Hospital Day # 44    Assessment and Plan: Jim Willingham is a 65 year old male with history of NICM EF 20% c/b VT s/p CRT-D s/p HMII LVAD 6/19/2017 originally intended as destination therapy 2/2 obesity however with recent weight loss now candidate for transplantation. He is admitted for worsening functional status and renal function concerning for worsening heart failure. He is now listed status 2E for transplant.    Changes today  - Bumex 4 mg PO BID- aim for net even  - PT for right rotator cuff injury. Also has tylenol, voltaren, and robaxin abailable  - If he needs opioid for breathrough pain would prefer dilaudid over oxycodone d/t prior falls on oxycodone    Chronic systolic heart failure secondary to NICM s/p HM II LVAD. Echo 1/8/21 at 9400rpm, EF<30%m LVIDd 6.8cm, at least mildly reduced RV function, AoV closed without AI, mild-mod eccentric MR, dilated IVC without collapse. RHC 2/23 RA 7 PA 26/8(15) PCWP 6 Kee C/CO 4.6/2.2 TD CO/CI 5.5/2.6.    Stage D, NYHA Class III  ACEi/ARB contraindicated due to renal dysfunction. Hydralazine 50 mg po TID. Isordil 10 mg po TID.   BB contraindicated due to low cardiac output  Aldosterone antagonist contraindicated due to renal dysfunction  SCD prophylaxis CRT-D  Fluid status: Change bumex to 4 mg PO BID, goal to Keep weight aroud 219, euvolemic on RHC today  MAP: Goal 65-85  LDH: 342 3/19, check weekly  Anticoagulation: Coumadin per pharmacy. INR- 2.59, goal 2.5-3, goal increased in setting of power spikes and elevated flows this hospitalization.   Antiplatelet: ASA 81 mg po daily  - remains on the cardiac transplant list status 2E. Persistent escalation of diuretics in setting of progressive heart failure with refractory hypervolemia.     Carpel tunnel, bilateral s/p bilateral release. Evidence of  thenar atrophy. Relief with steroid injection 11/20. Carpal tunnel release 2/18/21.  - Appreciate Ortho/Plastics consult.  - Scheduled Tylenol 650 mg po QID.   - Gabapentin 300 mg in AM, 600 mg in the afternoon, and 600 mg in the evening.     Right shoulder rotator cuff injury  - Tylenol, robaxin, Voltaren  - PT consult today     WALTER on CKD Stage III. Cr peaked at 2.22  - Cr-1.90 (1.69).     History of Afib. History of VT/VF. CHADSVASC-5.   - Continue Amiodarone 200 mg po daily.   - Coumadin as above.     Depression. Patient with long standing depression. More symptoms over the last week or so.  - Social work consult  - Health Psych consult  - May benefit from wellbutrin titration     Polyarticular Acute Gout, resolved.   - Appreciate Rheumatology consult. .   - Anakinra daily as recommended by Rheumatology.     DM type II, controlled. Symptomatic Hypoglycemia, resolved. Low Cortisol: Hgb A1C-6.2. Lantus and Novololg sliding scale insulin discontinued. Endo consult appreciated. C-peptide 13.9 an Proinsulin 18.4. Serum cortisol 7.7 3/6/21, cosyntropin stim test WNL per Endo.   -Per endo --> Fingerstick glucose might not be accurate for hypoglycemia, if patient has glucose <55 (without insulin), plasma glucose should be sent for confirmation of hypoglycemia prior to correction. While the patient is off insulin therapy, POC glucose readings above 60 are acceptable for the patient     Chronic/resolved conditions  COPD/Asthma: pta Breo Ellipta, albuterol prn.   Right sided weakness, resolved. CT head negative for acute findings. Appreciate Neuro eval.   Staph Hominis Bacteremia. No need for surveillance blood cx per transplant ID.    Rhinovirus, resolved. Bacterial bronchitis. Completed 5 day course of Cefdinir. COVID negative 2/8. Resp PCR +rhinovirus. CXR 2/12 with no overt PNA. Stopped cefdinir 2/15. Cleared for transplant per ID. Repeat RVP negative 3/5. COVID repeated due to sinus congestion, negative. Congestion  "improved with transition from Claritin to Zyrtec.      FEN: 2 gram sodium diet   PROPHY:  Coumadin  LINES:  PIV   DISPO:  TBD pending cardiac transplant.   CODE STATUS: Full Code   ================================================================  Interval History/ROS: Shoulder pain is still present but better controlled. Significant improvement from robaxin and volteran.  He notes mild persistent abdominal distention. Le edema is improved. He denies fever, chills, chest pain, palpitations, cough, nausea, vomiting, diarrhea, melena, hematochezia, and concerns at his drive line site. He is tolerating oral intake and ambulation.     Last 24 hr care team notes reviewed.   ROS:  4 point ROS including Respiratory, CV, GI and , other than that noted in the HPI, is negative.     Medications: Reviewed in EPIC.     Physical Exam:   BP 92/80 (BP Location: Left arm)   Pulse 63   Temp 98  F (36.7  C) (Oral)   Resp 20   Ht 1.727 m (5' 8\")   Wt 100.2 kg (221 lb)   SpO2 95%   BMI 33.60 kg/m    GENERAL: Appears alert and interacting appropriately.  HEENT: Eye symmetrical and free of discharge bilaterally. Mucous membranes moist and without lesions.  NECK: Supple and without lymphadenopathy. JVD 10 cm.   CV: RRR, S1S2 present with LVAD hum.   RESPIRATORY: Respirations regular, even, and unlabored. Lungs CTA throughout.   GI: Soft and non distended with normoactive bowel sounds present in all quadrants. No tenderness, rebound, guarding. No organomegaly.   EXTREMITIES: Trace bilateral LE peripheral edema. All extremities are warm and well perfused  NEUROLOGIC: Alert and interacting appropiratly. No focal deficits.   MUSCULOSKELETAL: No joint swelling or tenderness.   SKIN: No jaundice. No rashes or lesions. LVAD drive line covered.     Data:  CMP  Recent Labs   Lab 03/24/21  0538 03/23/21  0539 03/22/21  0538 03/21/21  0608    137 137 138   POTASSIUM 4.1 4.2 4.1 4.1   CHLORIDE 105 104 105 104   CO2 25 28 26 28   ANIONGAP " 8 4 6 6   GLC 90 87 116* 85   BUN 47* 49* 52* 47*   CR 1.69* 1.90* 1.76* 1.66*   GFRESTIMATED 42* 37* 40* 43*   GFRESTBLACK 49* 42* 46* 50*   QAMAR 8.6 8.7 8.8 8.9   MAG 2.6* 2.5* 2.6* 2.4*   PROTTOTAL  --   --  6.1*  --    ALBUMIN  --   --  3.3*  --    BILITOTAL  --   --  0.4  --    ALKPHOS  --   --  101  --    AST  --   --  30  --    ALT  --   --  36  --      CBC  Recent Labs   Lab 03/23/21  1035 03/23/21  0539 03/21/21  0608 03/19/21  0548   WBC  --  4.4 4.1 4.3   RBC  --  3.74* 4.06* 4.14*   HGB 11.9* 11.1* 12.0* 12.3*   HCT  --  36.1* 39.2* 39.1*   MCV  --  97 97 94   MCH  --  29.7 29.6 29.7   MCHC  --  30.7* 30.6* 31.5   RDW  --  17.2* 17.6* 18.0*   PLT  --  171 187 194     INR  Recent Labs   Lab 03/24/21  0538 03/23/21  0539 03/22/21  0538 03/21/21  0608   INR 2.59* 2.58* 2.56* 2.36*       Patient discussed with Dr. Sears.      Didi Butler PA-C  [facility] Cardiology

## 2021-03-25 LAB
ANION GAP SERPL CALCULATED.3IONS-SCNC: 6 MMOL/L (ref 3–14)
BUN SERPL-MCNC: 42 MG/DL (ref 7–30)
CALCIUM SERPL-MCNC: 8.6 MG/DL (ref 8.5–10.1)
CHLORIDE SERPL-SCNC: 104 MMOL/L (ref 94–109)
CO2 SERPL-SCNC: 27 MMOL/L (ref 20–32)
CREAT SERPL-MCNC: 1.59 MG/DL (ref 0.66–1.25)
ERYTHROCYTE [DISTWIDTH] IN BLOOD BY AUTOMATED COUNT: 17 % (ref 10–15)
GFR SERPL CREATININE-BSD FRML MDRD: 45 ML/MIN/{1.73_M2}
GLUCOSE BLDC GLUCOMTR-MCNC: 106 MG/DL (ref 70–99)
GLUCOSE BLDC GLUCOMTR-MCNC: 167 MG/DL (ref 70–99)
GLUCOSE BLDC GLUCOMTR-MCNC: 64 MG/DL (ref 70–99)
GLUCOSE BLDC GLUCOMTR-MCNC: 88 MG/DL (ref 70–99)
GLUCOSE BLDC GLUCOMTR-MCNC: 98 MG/DL (ref 70–99)
GLUCOSE SERPL-MCNC: 94 MG/DL (ref 70–99)
HCT VFR BLD AUTO: 38 % (ref 40–53)
HGB BLD-MCNC: 12 G/DL (ref 13.3–17.7)
INR PPP: 2.58 (ref 0.86–1.14)
MAGNESIUM SERPL-MCNC: 2.7 MG/DL (ref 1.6–2.3)
MCH RBC QN AUTO: 30.5 PG (ref 26.5–33)
MCHC RBC AUTO-ENTMCNC: 31.6 G/DL (ref 31.5–36.5)
MCV RBC AUTO: 96 FL (ref 78–100)
PLATELET # BLD AUTO: 200 10E9/L (ref 150–450)
POTASSIUM SERPL-SCNC: 4.2 MMOL/L (ref 3.4–5.3)
RBC # BLD AUTO: 3.94 10E12/L (ref 4.4–5.9)
SODIUM SERPL-SCNC: 137 MMOL/L (ref 133–144)
WBC # BLD AUTO: 4 10E9/L (ref 4–11)

## 2021-03-25 PROCEDURE — 999N001017 HC STATISTIC GLUCOSE BY METER IP

## 2021-03-25 PROCEDURE — 250N000013 HC RX MED GY IP 250 OP 250 PS 637: Performed by: STUDENT IN AN ORGANIZED HEALTH CARE EDUCATION/TRAINING PROGRAM

## 2021-03-25 PROCEDURE — 36415 COLL VENOUS BLD VENIPUNCTURE: CPT | Performed by: STUDENT IN AN ORGANIZED HEALTH CARE EDUCATION/TRAINING PROGRAM

## 2021-03-25 PROCEDURE — 250N000013 HC RX MED GY IP 250 OP 250 PS 637: Performed by: PHYSICIAN ASSISTANT

## 2021-03-25 PROCEDURE — 250N000009 HC RX 250: Performed by: NURSE PRACTITIONER

## 2021-03-25 PROCEDURE — 85027 COMPLETE CBC AUTOMATED: CPT | Performed by: STUDENT IN AN ORGANIZED HEALTH CARE EDUCATION/TRAINING PROGRAM

## 2021-03-25 PROCEDURE — 250N000013 HC RX MED GY IP 250 OP 250 PS 637: Performed by: INTERNAL MEDICINE

## 2021-03-25 PROCEDURE — 99232 SBSQ HOSP IP/OBS MODERATE 35: CPT | Mod: 24 | Performed by: PHYSICIAN ASSISTANT

## 2021-03-25 PROCEDURE — 83735 ASSAY OF MAGNESIUM: CPT | Performed by: STUDENT IN AN ORGANIZED HEALTH CARE EDUCATION/TRAINING PROGRAM

## 2021-03-25 PROCEDURE — 250N000013 HC RX MED GY IP 250 OP 250 PS 637: Performed by: NURSE PRACTITIONER

## 2021-03-25 PROCEDURE — 93750 INTERROGATION VAD IN PERSON: CPT | Performed by: PHYSICIAN ASSISTANT

## 2021-03-25 PROCEDURE — 214N000001 HC R&B CCU UMMC

## 2021-03-25 PROCEDURE — 85610 PROTHROMBIN TIME: CPT | Performed by: STUDENT IN AN ORGANIZED HEALTH CARE EDUCATION/TRAINING PROGRAM

## 2021-03-25 PROCEDURE — 80048 BASIC METABOLIC PNL TOTAL CA: CPT | Performed by: STUDENT IN AN ORGANIZED HEALTH CARE EDUCATION/TRAINING PROGRAM

## 2021-03-25 RX ORDER — WARFARIN SODIUM 7.5 MG/1
7.5 TABLET ORAL
Status: COMPLETED | OUTPATIENT
Start: 2021-03-25 | End: 2021-03-25

## 2021-03-25 RX ADMIN — BUMETANIDE 4 MG: 2 TABLET ORAL at 13:40

## 2021-03-25 RX ADMIN — POTASSIUM CHLORIDE 40 MEQ: 750 TABLET, EXTENDED RELEASE ORAL at 08:06

## 2021-03-25 RX ADMIN — AMIODARONE HYDROCHLORIDE 200 MG: 200 TABLET ORAL at 07:43

## 2021-03-25 RX ADMIN — ISOSORBIDE DINITRATE 10 MG: 10 TABLET ORAL at 19:38

## 2021-03-25 RX ADMIN — ACETAMINOPHEN 650 MG: 325 TABLET, FILM COATED ORAL at 22:24

## 2021-03-25 RX ADMIN — HYDRALAZINE HYDROCHLORIDE 50 MG: 50 TABLET ORAL at 08:05

## 2021-03-25 RX ADMIN — DICLOFENAC SODIUM 2 G: 10 GEL TOPICAL at 19:43

## 2021-03-25 RX ADMIN — ISOSORBIDE DINITRATE 10 MG: 10 TABLET ORAL at 13:40

## 2021-03-25 RX ADMIN — GABAPENTIN 600 MG: 300 CAPSULE ORAL at 13:40

## 2021-03-25 RX ADMIN — ANAKINRA 100 MG: 100 INJECTION, SOLUTION SUBCUTANEOUS at 08:14

## 2021-03-25 RX ADMIN — ALLOPURINOL 300 MG: 300 TABLET ORAL at 07:43

## 2021-03-25 RX ADMIN — Medication 10 MG: at 22:24

## 2021-03-25 RX ADMIN — BUPROPION HYDROCHLORIDE 100 MG: 100 TABLET, EXTENDED RELEASE ORAL at 19:38

## 2021-03-25 RX ADMIN — WARFARIN SODIUM 7.5 MG: 7.5 TABLET ORAL at 17:06

## 2021-03-25 RX ADMIN — ACETAMINOPHEN 650 MG: 325 TABLET, FILM COATED ORAL at 08:19

## 2021-03-25 RX ADMIN — FLUTICASONE FUROATE AND VILANTEROL TRIFENATATE 1 PUFF: 100; 25 POWDER RESPIRATORY (INHALATION) at 08:05

## 2021-03-25 RX ADMIN — GABAPENTIN 600 MG: 300 CAPSULE ORAL at 22:24

## 2021-03-25 RX ADMIN — METHOCARBAMOL 500 MG: 500 TABLET, FILM COATED ORAL at 22:24

## 2021-03-25 RX ADMIN — GABAPENTIN 300 MG: 300 CAPSULE ORAL at 08:05

## 2021-03-25 RX ADMIN — HYDRALAZINE HYDROCHLORIDE 50 MG: 50 TABLET ORAL at 19:38

## 2021-03-25 RX ADMIN — DICLOFENAC SODIUM 2 G: 10 GEL TOPICAL at 07:44

## 2021-03-25 RX ADMIN — DICLOFENAC SODIUM 2 G: 10 GEL TOPICAL at 13:38

## 2021-03-25 RX ADMIN — DOCUSATE SODIUM 50 MG AND SENNOSIDES 8.6 MG 1 TABLET: 8.6; 5 TABLET, FILM COATED ORAL at 19:38

## 2021-03-25 RX ADMIN — UMECLIDINIUM 1 PUFF: 62.5 AEROSOL, POWDER ORAL at 08:06

## 2021-03-25 RX ADMIN — HYDRALAZINE HYDROCHLORIDE 50 MG: 50 TABLET ORAL at 13:40

## 2021-03-25 RX ADMIN — POTASSIUM CHLORIDE 40 MEQ: 750 TABLET, EXTENDED RELEASE ORAL at 19:38

## 2021-03-25 RX ADMIN — ISOSORBIDE DINITRATE 10 MG: 10 TABLET ORAL at 08:06

## 2021-03-25 RX ADMIN — ASPIRIN 81 MG CHEWABLE TABLET 81 MG: 81 TABLET CHEWABLE at 07:43

## 2021-03-25 RX ADMIN — ACETAMINOPHEN 650 MG: 325 TABLET, FILM COATED ORAL at 13:39

## 2021-03-25 RX ADMIN — BUPROPION HYDROCHLORIDE 100 MG: 100 TABLET, EXTENDED RELEASE ORAL at 09:21

## 2021-03-25 RX ADMIN — MONTELUKAST 10 MG: 10 TABLET, FILM COATED ORAL at 22:24

## 2021-03-25 RX ADMIN — CETIRIZINE HYDROCHLORIDE 10 MG: 10 TABLET, FILM COATED ORAL at 07:44

## 2021-03-25 RX ADMIN — BUMETANIDE 4 MG: 2 TABLET ORAL at 07:43

## 2021-03-25 ASSESSMENT — ACTIVITIES OF DAILY LIVING (ADL)
ADLS_ACUITY_SCORE: 10

## 2021-03-25 ASSESSMENT — MIFFLIN-ST. JEOR: SCORE: 1778.3

## 2021-03-25 NOTE — PROGRESS NOTES
[facility]   Cardiology II Service / Advanced Heart Failure  Daily Progress Note      Patient: Jim Willingham  MRN: [id number]  Admission Date: 2/8/2021  Hospital Day # 45    Assessment and Plan: Jim Willingham is a 65 year old male with history of NICM EF 20% c/b VT s/p CRT-D s/p HMII LVAD 6/19/2017 originally intended as destination therapy 2/2 obesity however with recent weight loss now candidate for transplantation. He is admitted for worsening functional status and renal function concerning for worsening heart failure. He is now listed status 2E for transplant.    Changes today  - Bumex 4 mg PO BID- aiming for net even to net negative 500 cc  - PT for right rotator cuff injury. Also has tylenol, voltaren, and robaxin abailable  - If he needs opioid for breathrough pain would prefer dilaudid over oxycodone d/t prior falls on oxycodone, pt aware that this is only for severe pain and I would expect this to be used less than once per week.    Chronic systolic heart failure secondary to NICM s/p HM II LVAD. Echo 1/8/21 at 9400rpm, EF<30%m LVIDd 6.8cm, at least mildly reduced RV function, AoV closed without AI, mild-mod eccentric MR, dilated IVC without collapse. RHC 2/23 RA 7 PA 26/8(15) PCWP 6 Kee C/CO 4.6/2.2 TD CO/CI 5.5/2.6.    Stage D, NYHA Class III  ACEi/ARB contraindicated due to renal dysfunction. Hydralazine 50 mg po TID. Isordil 10 mg po TID.   BB contraindicated due to low cardiac output  Aldosterone antagonist contraindicated due to renal dysfunction  SCD prophylaxis CRT-D  Fluid status: Change bumex to 4 mg PO BID, goal to Keep weight aroud 219, euvolemic on RHC today  MAP: Goal 65-85  LDH: 342 3/19, check weekly  Anticoagulation: Coumadin per pharmacy. INR- 2.58, goal 2.5-3, goal increased in setting of power spikes and elevated flows this hospitalization.   Antiplatelet: ASA 81 mg po daily  - remains on the cardiac transplant list status 2E. Persistent escalation of diuretics in  setting of progressive heart failure with refractory hypervolemia.     Carpel tunnel, bilateral s/p bilateral release. Evidence of thenar atrophy. Relief with steroid injection 11/20. Carpal tunnel release 2/18/21.  - Appreciate Ortho/Plastics consult.  - Scheduled Tylenol 650 mg po QID.   - Gabapentin 300 mg in AM, 600 mg in the afternoon, and 600 mg in the evening.     Right shoulder rotator cuff injury, chronic  - Tylenol, robaxin, Voltaren  - PT consult to provide should exercises     WALTER on CKD Stage III. Cr peaked at 2.22  - Cr-1.59 (F1.7)     History of Afib. History of VT/VF. CHADSVASC-5.   - Continue Amiodarone 200 mg po daily.   - Coumadin as above.     NSVT 15 beats on 3/24.  - Continue amiodarone  - Continue on tele    Depression. Patient with long standing depression. More symptoms over the last week or so.  - Social work consult  - Health Psych consult  - Increase wellbutrin to 100 mg BID on 3/24     Polyarticular Acute Gout, resolved.   - Appreciate Rheumatology consult. .   - Anakinra daily as recommended by Rheumatology.     DM type II, controlled. Symptomatic Hypoglycemia, resolved. Low Cortisol: Hgb A1C-6.2. Lantus and Novololg sliding scale insulin discontinued. Endo consult appreciated. C-peptide 13.9 an Proinsulin 18.4. Serum cortisol 7.7 3/6/21, cosyntropin stim test WNL per Endo.   -Per endo --> Fingerstick glucose might not be accurate for hypoglycemia, if patient has glucose <55 (without insulin), plasma glucose should be sent for confirmation of hypoglycemia prior to correction. While the patient is off insulin therapy, POC glucose readings above 60 are acceptable for the patient  - Conditional order placed for serum glucose to be drawn prior to correction if POC glucose is <55     Chronic/resolved conditions  COPD/Asthma: pta Breo Ellipta, albuterol prn.   Right sided weakness, resolved. CT head negative for acute findings. Appreciate Neuro eval.   Staph Hominis Bacteremia. No need  "for surveillance blood cx per transplant ID.    Rhinovirus, resolved. Bacterial bronchitis. Completed 5 day course of Cefdinir. COVID negative 2/8. Resp PCR +rhinovirus. CXR 2/12 with no overt PNA. Stopped cefdinir 2/15. Cleared for transplant per ID. Repeat RVP negative 3/5. COVID repeated due to sinus congestion, negative. Congestion improved with transition from Claritin to Zyrtec.      FEN: 2 gram sodium diet   PROPHY:  Coumadin  LINES:  PIV   DISPO:  TBD pending cardiac transplant.   CODE STATUS: Full Code   ================================================================  Interval History/ROS: Shoulder pain is still present but better controlled. Significant improvement from robaxin and volteran and bengey.  He notes mild persistent abdominal distention. Le edema is improved. He denies fever, chills, chest pain, palpitations, cough, nausea, vomiting, diarrhea, melena, hematochezia, and concerns at his drive line site. He is tolerating oral intake and ambulation. No futrther episodes of blurry visition.    Last 24 hr care team notes reviewed.   ROS:  4 point ROS including Respiratory, CV, GI and , other than that noted in the HPI, is negative.     Medications: Reviewed in EPIC.     Physical Exam:   BP 97/87 (BP Location: Left arm)   Pulse 65   Temp 94.7  F (34.8  C) (Oral)   Resp 16   Ht 1.727 m (5' 8\")   Wt 100.9 kg (222 lb 6.4 oz)   SpO2 97%   BMI 33.82 kg/m    GENERAL: Appears alert and interacting appropriately.  HEENT: Eye symmetrical and free of discharge bilaterally. Mucous membranes moist and without lesions.  NECK: Supple and without lymphadenopathy. JVD 10-11 cm.   CV: RRR, S1S2 present with LVAD hum.   RESPIRATORY: Respirations regular, even, and unlabored. Lungs CTA throughout.   GI: Soft and non distended with normoactive bowel sounds present in all quadrants. No tenderness, rebound, guarding. No organomegaly.   EXTREMITIES: Trace bilateral LE peripheral edema. All extremities are warm and " well perfused  NEUROLOGIC: Alert and interacting appropiratly. No focal deficits.   MUSCULOSKELETAL: No joint swelling or tenderness.   SKIN: No jaundice. No rashes or lesions. LVAD drive line covered.     Data:  CMP  Recent Labs   Lab 03/25/21  0525 03/24/21  0538 03/23/21  0539 03/22/21  0538    138 137 137   POTASSIUM 4.2 4.1 4.2 4.1   CHLORIDE 104 105 104 105   CO2 27 25 28 26   ANIONGAP 6 8 4 6   GLC 94 90 87 116*   BUN 42* 47* 49* 52*   CR 1.59* 1.69* 1.90* 1.76*   GFRESTIMATED 45* 42* 37* 40*   GFRESTBLACK 52* 49* 42* 46*   QAMAR 8.6 8.6 8.7 8.8   MAG 2.7* 2.6* 2.5* 2.6*   PROTTOTAL  --   --   --  6.1*   ALBUMIN  --   --   --  3.3*   BILITOTAL  --   --   --  0.4   ALKPHOS  --   --   --  101   AST  --   --   --  30   ALT  --   --   --  36     CBC  Recent Labs   Lab 03/25/21  0525 03/23/21  1035 03/23/21  0539 03/21/21  0608 03/19/21  0548   WBC 4.0  --  4.4 4.1 4.3   RBC 3.94*  --  3.74* 4.06* 4.14*   HGB 12.0* 11.9* 11.1* 12.0* 12.3*   HCT 38.0*  --  36.1* 39.2* 39.1*   MCV 96  --  97 97 94   MCH 30.5  --  29.7 29.6 29.7   MCHC 31.6  --  30.7* 30.6* 31.5   RDW 17.0*  --  17.2* 17.6* 18.0*     --  171 187 194     INR  Recent Labs   Lab 03/25/21  0525 03/24/21  0538 03/23/21  0539 03/22/21  0538   INR 2.58* 2.59* 2.58* 2.56*       Patient discussed with Dr. Sears.      Didi Butler PA-C  [facility] Cardiology

## 2021-03-25 NOTE — PROGRESS NOTES
D: Listed 2E on heart transplant list. Admitted 2/8 for worsening heart failure and renal function concerns.  Hx of COPD, CKD3, DM2, NICM (EF 20%), VT/VF s/p CRT-d s/p LVAD HM2 (2017).     I: Monitored vitals and assessed pt status. Pt gets BG twice daily and HS. Changed LVAD dressing in AM. Used scheduled Voltaren cream for R shoulder pain.  PRN tylenol 650mg x2 for R shoulder pain    A: Afebrile. VSS on RA. SR. LVAD #s WNL. Pt had BG of 64 @1237 gave 2 OJs and 1 apple juice and BG improved to 106. R shoulder pain due to rotator cuff injury. Skin is peeling at wrist sites (bilateral) where carpal tunnel surgery was performed. R PIV. Attended LVAD meeting @1200. Pt ambulated in hallways independently. 3g Na+ diet. LBM 3/25.    P: Continue to monitor and report changes/questions/concerns to Cards 2.

## 2021-03-25 NOTE — PROGRESS NOTES
The patient's HeartMate LVAD was interrogated 3/25/2021  * Speed 9600 rpm   * Pulsatility index 3.6   * Power 6.7 Manuel   * Flow 6.3 L/minute   Fluid status: euvolemic   Alarms were reviewed, and notable for no further low-voltage alarms, occasional PI events.   The driveline exit site was inspected, c/d/i.   All external components were inspected and showed no evidence of damage or malfunction, none replaced.   No changes to VAD settings made

## 2021-03-25 NOTE — PROGRESS NOTES
CLINICAL NUTRITION SERVICES - REASSESSMENT NOTE     Nutrition Prescription    RECOMMENDATIONS FOR MDs/PROVIDERS TO ORDER:  --Encourage PO intake.   --Consider liberalizing diet to regular to offer more variety to meals given prolonged LOS if medically appropriate as per primary team.     Malnutrition Status:    Non-severe malnutrition in the context of acute on chronic illness    Recommendations already ordered by Registered Dietitian (RD):  --Continue Glucerna TID (strawberry, chocolate, or butter pecan).    Future/Additional Recommendations:  --PO intake, weight trends, POC.      EVALUATION OF THE PROGRESS TOWARD GOALS   Diet: 3 g Sodium, Glucerna between meals  Intake: 100% x 1 meal/day documented, ordering 1-3 meals/day per [facility]    Pt was eating breakfast at time of visit, reports appetite is good, however the menu has become more challenging to work with due to prolonged LOS. Pt reports the [facility] have been helpful in suggesting different items pt may order that can offer variety to meals. Pt reports his wife is still bringing in food a few days a week, usually spaghetti, chili, pizza, or sandwiches. Pt is still drinking 3 Glucerna/day and enjoys the strawberry, chocolate, and butter pecan flavors. Pt okay with just chocolate and butter pecan if strawberry is no longer available. RD to follow up if pt would like to trial strawberry Ensure Enlive.      NEW FINDINGS   Weight: overall stable, slight fluctuations suspect 2/2 fluid shifts  Labs: Cr 1.59 (H)  Meds: bumex, klor-con (40 mEq BID), senna-docusate, coumadin  GI: +BM 3/24    Pt remains admitted awaiting heart transplant.     MALNUTRITION  % Intake: No decreased intake noted  % Weight Loss: None noted  Subcutaneous Fat Loss: Facial region:  Mild on visual inspection  Muscle Loss: Temporal:  mild and Thoracic region (clavicle, acromium bone, deltoid, trapezius, pectoral):  Mild on visual inspection  Fluid Accumulation/Edema: trace 1+  edema  Malnutrition Diagnosis: Non-severe malnutrition in the context of acute on chronic illness    Previous Goals   Patient to consume % of nutritionally adequate meal trays TID, or the equivalent with supplements/snacks.  Evaluation: Met    Previous Nutrition Diagnosis  Predicted inadequate nutrient intake (protein-energy) related to food choices, LOS increasing risk for menu fatigue, and potential upcoming surgery and unknown NPO status duration.  Evaluation: No change    CURRENT NUTRITION DIAGNOSIS  Predicted inadequate nutrient intake (protein-energy) related to food choices, LOS increasing risk for menu fatigue, and potential upcoming surgery and unknown NPO status duration.    INTERVENTIONS  Implementation  Added notes in [facility] for [facility] re: flavor preferences for Glucerna and to please continue offering suggestions for offering variety to pt meals.     Goals  Patient to consume % of nutritionally adequate meal trays TID, or the equivalent with supplements/snacks.    Monitoring/Evaluation  Progress toward goals will be monitored and evaluated per protocol.    Tori Crowley, MS, RD, LD, [facility]  6C RD Pager: [phone]

## 2021-03-25 NOTE — PLAN OF CARE
D: Admitted 2/08 for worsening functional status and renal function concerning for worsening heart failure. Now listed status 2E on heart transplant list.   Hx: COPD, CKD3, DM2, NICM (EF 20%), VT/VF s/p CRT-D s/p LVAD HM2 2017 initially as destination therapy d/t obesity, but pt now eligible for transplant due to weight loss.  ?  I: Monitored vitals and assessed pt status.   PRN: tylenol x1, Robaxin x1, Bengay cream, melatonin at HS     A: A0x4. VSS on RA. Tele shows SR, rate 60-70s. HM2 LVAD without alarms, numbers WNL, and dressing CDI. Afebrile. Pt reported pain in R shoulder d/t rotator cuff injury, relieved by prn tylenol, robaxin, Bengay cream, and scheduled Voltaren cream. Pt declined lidocaine patches. Urinating adequately. Up independently. Blood sugars twice daily and HS. 2216 BG was 82 - pt given food and recheck an hour later was 199. Do not disturb orders [id number]. Slept between cares.      P: Continue to monitor Pt status and report changes to Cards 2.     [id number]  Rajani Holman RN on 3/25/2021 at 6:03 AM

## 2021-03-25 NOTE — PLAN OF CARE
[id number] 3/25/2021     Patient is a 65 year old male admitted for decompensated heart failure awaiting a transplant (status 2D) with a PMH of Afib, diabetes, VT, WALTER, SOB, COPD, and gout. Patients team is Cards 2.     Neuro: A&Ox4; pupils equal and reactive  Cardiac: Sinus rhythm with 1st degree AVB; LVAD heartmate 2; VAD numbers in baseline; soft blood pressures  Respiratory: WDL; on room air; clear lung sounds; no SOB noted/reported   GI/: Uses bed side urinal; increased urine output r/t diuretics; strict I&O  Endocrine: WDL; blood sugars BID and HS  Diet/Appetite: 3 gram sodium diet  Skin: Slight edema in lower extremities; no other signs of skin breakdown (patient repositions frequently)  LDA: Right PIV; LVAD abdomen exit site  Activity: Independent in room; do not distub from [id number]  Pain: PRN Tylenol, Voltaren gel, and Robaxin for shoulder pain  Plan: Awaiting heart transplant     Shanon Orellana RN on 3/25/2021 at 5:05 PM

## 2021-03-26 LAB
ANION GAP SERPL CALCULATED.3IONS-SCNC: 5 MMOL/L (ref 3–14)
ANION GAP SERPL CALCULATED.3IONS-SCNC: 6 MMOL/L (ref 3–14)
BUN SERPL-MCNC: 37 MG/DL (ref 7–30)
BUN SERPL-MCNC: 43 MG/DL (ref 7–30)
CALCIUM SERPL-MCNC: 8.5 MG/DL (ref 8.5–10.1)
CALCIUM SERPL-MCNC: 8.8 MG/DL (ref 8.5–10.1)
CHLORIDE SERPL-SCNC: 101 MMOL/L (ref 94–109)
CHLORIDE SERPL-SCNC: 105 MMOL/L (ref 94–109)
CO2 SERPL-SCNC: 26 MMOL/L (ref 20–32)
CO2 SERPL-SCNC: 29 MMOL/L (ref 20–32)
CREAT SERPL-MCNC: 1.37 MG/DL (ref 0.66–1.25)
CREAT SERPL-MCNC: 1.57 MG/DL (ref 0.66–1.25)
GFR SERPL CREATININE-BSD FRML MDRD: 46 ML/MIN/{1.73_M2}
GFR SERPL CREATININE-BSD FRML MDRD: 54 ML/MIN/{1.73_M2}
GLUCOSE BLDC GLUCOMTR-MCNC: 126 MG/DL (ref 70–99)
GLUCOSE BLDC GLUCOMTR-MCNC: 139 MG/DL (ref 70–99)
GLUCOSE BLDC GLUCOMTR-MCNC: 54 MG/DL (ref 70–99)
GLUCOSE BLDC GLUCOMTR-MCNC: 86 MG/DL (ref 70–99)
GLUCOSE SERPL-MCNC: 146 MG/DL (ref 70–99)
GLUCOSE SERPL-MCNC: 96 MG/DL (ref 70–99)
INR PPP: 2.62 (ref 0.86–1.14)
LDH SERPL L TO P-CCNC: 298 U/L (ref 85–227)
MAGNESIUM SERPL-MCNC: 2.6 MG/DL (ref 1.6–2.3)
MAGNESIUM SERPL-MCNC: 2.6 MG/DL (ref 1.6–2.3)
POTASSIUM SERPL-SCNC: 4.4 MMOL/L (ref 3.4–5.3)
POTASSIUM SERPL-SCNC: 4.4 MMOL/L (ref 3.4–5.3)
SODIUM SERPL-SCNC: 135 MMOL/L (ref 133–144)
SODIUM SERPL-SCNC: 137 MMOL/L (ref 133–144)

## 2021-03-26 PROCEDURE — 250N000013 HC RX MED GY IP 250 OP 250 PS 637: Performed by: STUDENT IN AN ORGANIZED HEALTH CARE EDUCATION/TRAINING PROGRAM

## 2021-03-26 PROCEDURE — 250N000013 HC RX MED GY IP 250 OP 250 PS 637: Performed by: PHYSICIAN ASSISTANT

## 2021-03-26 PROCEDURE — 250N000013 HC RX MED GY IP 250 OP 250 PS 637: Performed by: INTERNAL MEDICINE

## 2021-03-26 PROCEDURE — 250N000009 HC RX 250: Performed by: NURSE PRACTITIONER

## 2021-03-26 PROCEDURE — 80048 BASIC METABOLIC PNL TOTAL CA: CPT | Performed by: STUDENT IN AN ORGANIZED HEALTH CARE EDUCATION/TRAINING PROGRAM

## 2021-03-26 PROCEDURE — 36415 COLL VENOUS BLD VENIPUNCTURE: CPT | Performed by: STUDENT IN AN ORGANIZED HEALTH CARE EDUCATION/TRAINING PROGRAM

## 2021-03-26 PROCEDURE — 85610 PROTHROMBIN TIME: CPT | Performed by: STUDENT IN AN ORGANIZED HEALTH CARE EDUCATION/TRAINING PROGRAM

## 2021-03-26 PROCEDURE — 250N000013 HC RX MED GY IP 250 OP 250 PS 637: Performed by: NURSE PRACTITIONER

## 2021-03-26 PROCEDURE — 83615 LACTATE (LD) (LDH) ENZYME: CPT | Performed by: STUDENT IN AN ORGANIZED HEALTH CARE EDUCATION/TRAINING PROGRAM

## 2021-03-26 PROCEDURE — 999N001017 HC STATISTIC GLUCOSE BY METER IP

## 2021-03-26 PROCEDURE — 36415 COLL VENOUS BLD VENIPUNCTURE: CPT | Performed by: NURSE PRACTITIONER

## 2021-03-26 PROCEDURE — 99232 SBSQ HOSP IP/OBS MODERATE 35: CPT | Mod: 24 | Performed by: NURSE PRACTITIONER

## 2021-03-26 PROCEDURE — 83735 ASSAY OF MAGNESIUM: CPT | Performed by: STUDENT IN AN ORGANIZED HEALTH CARE EDUCATION/TRAINING PROGRAM

## 2021-03-26 PROCEDURE — 214N000001 HC R&B CCU UMMC

## 2021-03-26 PROCEDURE — 80048 BASIC METABOLIC PNL TOTAL CA: CPT | Performed by: NURSE PRACTITIONER

## 2021-03-26 PROCEDURE — 93750 INTERROGATION VAD IN PERSON: CPT | Performed by: NURSE PRACTITIONER

## 2021-03-26 PROCEDURE — 83735 ASSAY OF MAGNESIUM: CPT | Performed by: NURSE PRACTITIONER

## 2021-03-26 RX ORDER — BUMETANIDE 1 MG/1
1 TABLET ORAL ONCE
Status: COMPLETED | OUTPATIENT
Start: 2021-03-26 | End: 2021-03-26

## 2021-03-26 RX ORDER — WARFARIN SODIUM 7.5 MG/1
7.5 TABLET ORAL
Status: COMPLETED | OUTPATIENT
Start: 2021-03-26 | End: 2021-03-26

## 2021-03-26 RX ADMIN — Medication 10 MG: at 22:17

## 2021-03-26 RX ADMIN — ISOSORBIDE DINITRATE 10 MG: 10 TABLET ORAL at 13:22

## 2021-03-26 RX ADMIN — Medication: at 06:42

## 2021-03-26 RX ADMIN — DICLOFENAC SODIUM 2 G: 10 GEL TOPICAL at 12:23

## 2021-03-26 RX ADMIN — BUPROPION HYDROCHLORIDE 100 MG: 100 TABLET, EXTENDED RELEASE ORAL at 19:22

## 2021-03-26 RX ADMIN — ASPIRIN 81 MG CHEWABLE TABLET 81 MG: 81 TABLET CHEWABLE at 09:17

## 2021-03-26 RX ADMIN — POTASSIUM CHLORIDE 40 MEQ: 750 TABLET, EXTENDED RELEASE ORAL at 19:22

## 2021-03-26 RX ADMIN — GABAPENTIN 600 MG: 300 CAPSULE ORAL at 22:13

## 2021-03-26 RX ADMIN — ANAKINRA 100 MG: 100 INJECTION, SOLUTION SUBCUTANEOUS at 09:23

## 2021-03-26 RX ADMIN — ISOSORBIDE DINITRATE 10 MG: 10 TABLET ORAL at 09:16

## 2021-03-26 RX ADMIN — ALLOPURINOL 300 MG: 300 TABLET ORAL at 09:17

## 2021-03-26 RX ADMIN — ACETAMINOPHEN 650 MG: 325 TABLET, FILM COATED ORAL at 22:21

## 2021-03-26 RX ADMIN — ISOSORBIDE DINITRATE 10 MG: 10 TABLET ORAL at 19:22

## 2021-03-26 RX ADMIN — HYDRALAZINE HYDROCHLORIDE 50 MG: 50 TABLET ORAL at 19:22

## 2021-03-26 RX ADMIN — GABAPENTIN 600 MG: 300 CAPSULE ORAL at 13:23

## 2021-03-26 RX ADMIN — DICLOFENAC SODIUM 2 G: 10 GEL TOPICAL at 09:18

## 2021-03-26 RX ADMIN — METHOCARBAMOL 500 MG: 500 TABLET, FILM COATED ORAL at 06:42

## 2021-03-26 RX ADMIN — BUPROPION HYDROCHLORIDE 100 MG: 100 TABLET, EXTENDED RELEASE ORAL at 09:16

## 2021-03-26 RX ADMIN — GABAPENTIN 300 MG: 300 CAPSULE ORAL at 09:16

## 2021-03-26 RX ADMIN — POTASSIUM CHLORIDE 40 MEQ: 750 TABLET, EXTENDED RELEASE ORAL at 09:17

## 2021-03-26 RX ADMIN — ACETAMINOPHEN 650 MG: 325 TABLET, FILM COATED ORAL at 06:42

## 2021-03-26 RX ADMIN — DICLOFENAC SODIUM 2 G: 10 GEL TOPICAL at 16:01

## 2021-03-26 RX ADMIN — BUMETANIDE 1 MG: 1 TABLET ORAL at 10:23

## 2021-03-26 RX ADMIN — HYDRALAZINE HYDROCHLORIDE 50 MG: 50 TABLET ORAL at 13:22

## 2021-03-26 RX ADMIN — HYDRALAZINE HYDROCHLORIDE 50 MG: 50 TABLET ORAL at 09:16

## 2021-03-26 RX ADMIN — UMECLIDINIUM 1 PUFF: 62.5 AEROSOL, POWDER ORAL at 09:18

## 2021-03-26 RX ADMIN — DOCUSATE SODIUM 50 MG AND SENNOSIDES 8.6 MG 1 TABLET: 8.6; 5 TABLET, FILM COATED ORAL at 19:21

## 2021-03-26 RX ADMIN — CETIRIZINE HYDROCHLORIDE 10 MG: 10 TABLET, FILM COATED ORAL at 09:17

## 2021-03-26 RX ADMIN — AMIODARONE HYDROCHLORIDE 200 MG: 200 TABLET ORAL at 09:16

## 2021-03-26 RX ADMIN — BUMETANIDE 4 MG: 2 TABLET ORAL at 09:17

## 2021-03-26 RX ADMIN — WARFARIN SODIUM 7.5 MG: 7.5 TABLET ORAL at 17:35

## 2021-03-26 RX ADMIN — MONTELUKAST 10 MG: 10 TABLET, FILM COATED ORAL at 22:13

## 2021-03-26 RX ADMIN — FLUTICASONE FUROATE AND VILANTEROL TRIFENATATE 1 PUFF: 100; 25 POWDER RESPIRATORY (INHALATION) at 09:18

## 2021-03-26 RX ADMIN — BUMETANIDE 4 MG: 2 TABLET ORAL at 13:22

## 2021-03-26 ASSESSMENT — ACTIVITIES OF DAILY LIVING (ADL)
ADLS_ACUITY_SCORE: 10

## 2021-03-26 ASSESSMENT — MIFFLIN-ST. JEOR: SCORE: 1786.01

## 2021-03-26 NOTE — PLAN OF CARE
D: Admitted 2/08 for worsening functional status and renal function concerning for worsening heart failure. Now listed status 2E on heart transplant list.   Hx: COPD, CKD3, DM2, NICM (EF 20%), VT/VF s/p CRT-D s/p LVAD HM2 2017 initially as destination therapy d/t obesity, but pt now eligible for transplant due to weight loss.  ?  I: Monitored vitals and assessed pt status.   PRN: tylenol x2, Robaxin x2, Bengay cream, melatonin at HS     A: A0x4. VSS on RA/home CPAP at HS. Tele shows SR, rate 60-70s. HM2 LVAD without alarms, numbers WNL, and dressing CDI. Afebrile. Pt reported pain in R shoulder d/t rotator cuff injury, relieved by prn tylenol, robaxin, Bengay cream, and scheduled Voltaren cream. Pt declined lidocaine patches. Urinating adequately. Up independently. Blood sugars twice daily and HS. 2216 BG was 88 - pt given food and recheck an hour later was 167. Do not disturb orders [id number]. Slept between cares.      P: Continue to monitor Pt status and report changes to Cards 2.     [id number]  Rajani Holman RN on 3/26/2021 at 7:01 AM

## 2021-03-26 NOTE — SUMMARY OF CARE
-Pt discussed the difficulty of uncertainty and waiting for transplant. The idea of the psychological aspects of this situation and its significance were discussed.  -Strategies for coping with this dilemma were also discussed.

## 2021-03-26 NOTE — PROGRESS NOTES
[facility]   Cardiology II Service / Advanced Heart Failure  Daily Progress Note  Date of Service: 3/26/2021      Patient: Jim Willingham  MRN: [id number]  Admission Date: 2/8/2021  Hospital Day # 46    Assessment and Plan: Jim Willingham is a 65 year old male with history of NICM EF 20% c/b VT s/p CRT-D s/p HMII LVAD 6/19/2017 originally intended as destination therapy 2/2 obesity however with recent weight loss now candidate for transplantation. He is admitted for worsening functional status and renal function concerning for worsening heart failure. He is now listed status 2E for transplant.     Chronic systolic heart failure secondary to NICM s/p HM II LVAD. Echo 1/8/21 at 9400rpm, EF<30%m LVIDd 6.8cm, at least mildly reduced RV function, AoV closed without AI, mild-mod eccentric MR, dilated IVC without collapse. RHC 2/23 RA 7 PA 26/8(15) PCWP 6 Kee C/CO 4.6/2.2 TD CO/CI 5.5/2.6.    Stage D, NYHA Class III  ACEi/ARB contraindicated due to renal dysfunction. Hydralazine 50 mg po TID. Isordil 10 mg po TID.   BB contraindicated due to low cardiac output  Aldosterone antagonist contraindicated due to renal dysfunction  SCD prophylaxis CRT-D  Fluid status: Mild hypervolemia. Bumex 5 mg in AM and 4 mg in the afternoon   MAP: 68-97. Continue to trend  LDH: 298  Anticoagulation: Coumadin per pharmacy. INR- 2.62, goal 2.5-3, goal increased in setting of power spikes and elevated flows this hospitalization.   Antiplatelet: ASA 81 mg po daily  - remains on the cardiac transplant list status 2E. Persistent escalation of diuretics in setting of progressive heart failure with refractory hypervolemia.      The patient's HeartMate LVAD was interrogated 3/26/2021  * Speed 9600 rpm   * Pulsatility index 4  * Power 7.1 Manuel   * Flow 6.8 L/minute   Fluid status: mild hypervolemia  Alarms were reviewed, and notable for PI events.  All external components were inspected and showed no evidence of damage or  malfunction.     Carpel tunnel, bilateral s/p bilateral release. Evidence of thenar atrophy. Relief with steroid injection 11/20. Carpal tunnel release 2/18/21.  - Appreciate Ortho/Plastics consult.  - Scheduled Tylenol 650 mg po QID.   - Gabapentin 300 mg in AM, 600 mg in the afternoon, and 600 mg in the evening.      WALTER on CKD Stage III. Cr peaked at 2.22  - Cr-1.45 (1.56).     History of Afib. History of VT/VF. CHADSVASC-5.   - Continue Amiodarone 200 mg po daily.   - Coumadin as above.      Polyarticular Acute Gout, resolved. Minimal improvement with Colchicine times one. Anakinra 100 mg subcutaneous daily for 5 days completed. Repeated last week for acute flare.   - Appreciate Rheumatology consult.   - Oxycodone 5 mg q6h prn for 3 doses.   - Anakinra daily as recommended by Rheumatology.   - Continue Allopurinol 300 mg po daily     DM type II, controlled. Symptomatic Hypoglycemia, resolved. Low Cortisol: Hgb A1C-6.2. Lantus and Novololg sliding scale insulin discontinued. Endo consult appreciated. C-peptide 13.9 an Proinsulin 18.4. Serum cortisol 7.7 3/6/21, cosyntropin stim test WNL per Endo.   -Per endo --> Fingerstick glucose might not be accurate for hypoglycemia, if patient has glucose <55 (without insulin), plasma glucose should be sent for confirmation of hypoglycemia prior to correction. While the patient is off insulin therapy, POC glucose readings above 60 are acceptable for the patient     Chronic/resolved conditions  COPD/Asthma: pta Breo Ellipta, albuterol prn.   Depression: pta Bupropion  Right sided weakness, resolved. CT head negative for acute findings. Appreciate Neuro eval.   Staph Hominis Bacteremia. No need for surveillance blood cx per transplant ID.    Rhinovirus, resolved. Bacterial bronchitis. Completed 5 day course of Cefdinir. COVID negative 2/8. Resp PCR +rhinovirus. CXR 2/12 with no overt PNA. Stopped cefdinir 2/15. Cleared for transplant per ID. Repeat RVP negative  "3/5. COVID repeated due to sinus congestion, negative. Congestion improved with transition from Claritin to Zyrtec.      FEN: 3 gram sodium diet   PROPHY:  Coumadin  LINES:  PIV   DISPO:  TBD pending cardiac transplant.   CODE STATUS: Full Code   ================================================================    Interval History/ROS: He complains of RESENDIZ with minimal activity and abdominal distention. He denies fever, chills, chest pain, palpitations, cough, nausea, vomiting, diarrhea, melena, hematochezia, and LE edema. He denies any concerns at his drive line site. He is tolerating oral intake, notes limited activity due to RESENDIZ today.     Last 24 hr care team notes reviewed.   ROS:  4 point ROS including Respiratory, CV, GI and , other than that noted in the HPI, is negative.     Medications: Reviewed in EPIC.     Physical Exam:   BP (!) 80/69 (BP Location: Right arm)   Pulse 68   Temp 97.4  F (36.3  C) (Oral)   Resp 14   Ht 1.727 m (5' 8\")   Wt 101.7 kg (224 lb 1.6 oz)   SpO2 97%   BMI 34.07 kg/m    GENERAL: Appears alert and oriented times three.   HEENT: Eye symmetrical and free of discharge bilaterally. Mucous membranes moist and without lesions.  NECK: Supple and without lymphadenopathy. JVD 10-12 cm   CV: RRR, S1S2 present with LVAD hum.   RESPIRATORY: Respirations regular, even, and unlabored. Lungs CTA throughout.   GI: Soft and mildly distended with normoactive bowel sounds present in all quadrants. No tenderness, rebound, guarding. No organomegaly.   EXTREMITIES: Trace bilateral LE peripheral edema. 2+ bilateral pedal pulses.   NEUROLOGIC: Alert and orientated x 3. CN II-XII grossly intact. No focal deficits.   MUSCULOSKELETAL: No joint swelling or tenderness.   SKIN: No jaundice. No rashes or lesions. LVAD drive line covered.     Data:  CMP  Recent Labs   Lab 03/26/21  0623 03/25/21  0525 03/24/21  0538 03/23/21  0539 03/22/21  0538    137 138 137 137   POTASSIUM 4.4 4.2 4.1 4.2 4.1 "   CHLORIDE 105 104 105 104 105   CO2 26 27 25 28 26   ANIONGAP 6 6 8 4 6   GLC 96 94 90 87 116*   BUN 43* 42* 47* 49* 52*   CR 1.57* 1.59* 1.69* 1.90* 1.76*   GFRESTIMATED 46* 45* 42* 37* 40*   GFRESTBLACK 53* 52* 49* 42* 46*   QAMAR 8.5 8.6 8.6 8.7 8.8   MAG 2.6* 2.7* 2.6* 2.5* 2.6*   PROTTOTAL  --   --   --   --  6.1*   ALBUMIN  --   --   --   --  3.3*   BILITOTAL  --   --   --   --  0.4   ALKPHOS  --   --   --   --  101   AST  --   --   --   --  30   ALT  --   --   --   --  36     CBC  Recent Labs   Lab 03/25/21  0525 03/23/21  1035 03/23/21  0539 03/21/21  0608   WBC 4.0  --  4.4 4.1   RBC 3.94*  --  3.74* 4.06*   HGB 12.0* 11.9* 11.1* 12.0*   HCT 38.0*  --  36.1* 39.2*   MCV 96  --  97 97   MCH 30.5  --  29.7 29.6   MCHC 31.6  --  30.7* 30.6*   RDW 17.0*  --  17.2* 17.6*     --  171 187     INR  Recent Labs   Lab 03/26/21  0623 03/25/21  0525 03/24/21  0538 03/23/21  0539   INR 2.62* 2.58* 2.59* 2.58*       Patient discussed with Dr. Conte.      Soraya Marshall FN  3/26/2021

## 2021-03-26 NOTE — PROGRESS NOTES
D: stopped in patient room for routine virtual VAD Coordinator rounding. No VAD related questions or concerns at this time.  I: Discussed POC and provided support and listened to patient and care giver's thoughts and concerns.  P: Continue to follow patient and address any questions or concerns patient and or caregiver may have.

## 2021-03-27 LAB
ANION GAP SERPL CALCULATED.3IONS-SCNC: 7 MMOL/L (ref 3–14)
BUN SERPL-MCNC: 43 MG/DL (ref 7–30)
CALCIUM SERPL-MCNC: 8.8 MG/DL (ref 8.5–10.1)
CHLORIDE SERPL-SCNC: 105 MMOL/L (ref 94–109)
CO2 SERPL-SCNC: 25 MMOL/L (ref 20–32)
CREAT SERPL-MCNC: 1.56 MG/DL (ref 0.66–1.25)
ERYTHROCYTE [DISTWIDTH] IN BLOOD BY AUTOMATED COUNT: 16.8 % (ref 10–15)
GFR SERPL CREATININE-BSD FRML MDRD: 46 ML/MIN/{1.73_M2}
GLUCOSE BLDC GLUCOMTR-MCNC: 122 MG/DL (ref 70–99)
GLUCOSE BLDC GLUCOMTR-MCNC: 133 MG/DL (ref 70–99)
GLUCOSE BLDC GLUCOMTR-MCNC: 139 MG/DL (ref 70–99)
GLUCOSE SERPL-MCNC: 93 MG/DL (ref 70–99)
HCT VFR BLD AUTO: 37.5 % (ref 40–53)
HGB BLD-MCNC: 11.8 G/DL (ref 13.3–17.7)
INR PPP: 2.67 (ref 0.86–1.14)
MAGNESIUM SERPL-MCNC: 2.6 MG/DL (ref 1.6–2.3)
MCH RBC QN AUTO: 30.3 PG (ref 26.5–33)
MCHC RBC AUTO-ENTMCNC: 31.5 G/DL (ref 31.5–36.5)
MCV RBC AUTO: 96 FL (ref 78–100)
PLATELET # BLD AUTO: 175 10E9/L (ref 150–450)
POTASSIUM SERPL-SCNC: 4.4 MMOL/L (ref 3.4–5.3)
RBC # BLD AUTO: 3.89 10E12/L (ref 4.4–5.9)
SODIUM SERPL-SCNC: 137 MMOL/L (ref 133–144)
WBC # BLD AUTO: 4.1 10E9/L (ref 4–11)

## 2021-03-27 PROCEDURE — 80048 BASIC METABOLIC PNL TOTAL CA: CPT | Performed by: STUDENT IN AN ORGANIZED HEALTH CARE EDUCATION/TRAINING PROGRAM

## 2021-03-27 PROCEDURE — 250N000013 HC RX MED GY IP 250 OP 250 PS 637: Performed by: STUDENT IN AN ORGANIZED HEALTH CARE EDUCATION/TRAINING PROGRAM

## 2021-03-27 PROCEDURE — 85610 PROTHROMBIN TIME: CPT | Performed by: STUDENT IN AN ORGANIZED HEALTH CARE EDUCATION/TRAINING PROGRAM

## 2021-03-27 PROCEDURE — 214N000001 HC R&B CCU UMMC

## 2021-03-27 PROCEDURE — 999N001017 HC STATISTIC GLUCOSE BY METER IP

## 2021-03-27 PROCEDURE — 250N000009 HC RX 250: Performed by: NURSE PRACTITIONER

## 2021-03-27 PROCEDURE — 250N000013 HC RX MED GY IP 250 OP 250 PS 637: Performed by: NURSE PRACTITIONER

## 2021-03-27 PROCEDURE — 99232 SBSQ HOSP IP/OBS MODERATE 35: CPT | Mod: 24 | Performed by: NURSE PRACTITIONER

## 2021-03-27 PROCEDURE — 250N000013 HC RX MED GY IP 250 OP 250 PS 637: Performed by: INTERNAL MEDICINE

## 2021-03-27 PROCEDURE — 93750 INTERROGATION VAD IN PERSON: CPT | Performed by: NURSE PRACTITIONER

## 2021-03-27 PROCEDURE — 36415 COLL VENOUS BLD VENIPUNCTURE: CPT | Performed by: STUDENT IN AN ORGANIZED HEALTH CARE EDUCATION/TRAINING PROGRAM

## 2021-03-27 PROCEDURE — 250N000013 HC RX MED GY IP 250 OP 250 PS 637: Performed by: PHYSICIAN ASSISTANT

## 2021-03-27 PROCEDURE — 83735 ASSAY OF MAGNESIUM: CPT | Performed by: STUDENT IN AN ORGANIZED HEALTH CARE EDUCATION/TRAINING PROGRAM

## 2021-03-27 PROCEDURE — 85027 COMPLETE CBC AUTOMATED: CPT | Performed by: STUDENT IN AN ORGANIZED HEALTH CARE EDUCATION/TRAINING PROGRAM

## 2021-03-27 RX ORDER — WARFARIN SODIUM 7.5 MG/1
7.5 TABLET ORAL
Status: COMPLETED | OUTPATIENT
Start: 2021-03-27 | End: 2021-03-27

## 2021-03-27 RX ADMIN — ALBUTEROL SULFATE 2 PUFF: 90 AEROSOL, METERED RESPIRATORY (INHALATION) at 08:57

## 2021-03-27 RX ADMIN — METHOCARBAMOL 500 MG: 500 TABLET, FILM COATED ORAL at 22:14

## 2021-03-27 RX ADMIN — ALBUTEROL SULFATE 2 PUFF: 90 AEROSOL, METERED RESPIRATORY (INHALATION) at 19:48

## 2021-03-27 RX ADMIN — BUMETANIDE 4 MG: 2 TABLET ORAL at 08:29

## 2021-03-27 RX ADMIN — ISOSORBIDE DINITRATE 10 MG: 10 TABLET ORAL at 13:41

## 2021-03-27 RX ADMIN — BUPROPION HYDROCHLORIDE 100 MG: 100 TABLET, EXTENDED RELEASE ORAL at 08:29

## 2021-03-27 RX ADMIN — UMECLIDINIUM 1 PUFF: 62.5 AEROSOL, POWDER ORAL at 08:31

## 2021-03-27 RX ADMIN — BUMETANIDE 5 MG: 2 TABLET ORAL at 13:41

## 2021-03-27 RX ADMIN — GABAPENTIN 600 MG: 300 CAPSULE ORAL at 22:14

## 2021-03-27 RX ADMIN — AMIODARONE HYDROCHLORIDE 200 MG: 200 TABLET ORAL at 08:29

## 2021-03-27 RX ADMIN — DOCUSATE SODIUM 50 MG AND SENNOSIDES 8.6 MG 1 TABLET: 8.6; 5 TABLET, FILM COATED ORAL at 19:48

## 2021-03-27 RX ADMIN — Medication 10 MG: at 22:14

## 2021-03-27 RX ADMIN — ACETAMINOPHEN 650 MG: 325 TABLET, FILM COATED ORAL at 08:39

## 2021-03-27 RX ADMIN — HYDRALAZINE HYDROCHLORIDE 50 MG: 50 TABLET ORAL at 13:41

## 2021-03-27 RX ADMIN — FLUTICASONE FUROATE AND VILANTEROL TRIFENATATE 1 PUFF: 100; 25 POWDER RESPIRATORY (INHALATION) at 08:42

## 2021-03-27 RX ADMIN — CETIRIZINE HYDROCHLORIDE 10 MG: 10 TABLET, FILM COATED ORAL at 08:29

## 2021-03-27 RX ADMIN — HYDRALAZINE HYDROCHLORIDE 50 MG: 50 TABLET ORAL at 19:47

## 2021-03-27 RX ADMIN — ANAKINRA 100 MG: 100 INJECTION, SOLUTION SUBCUTANEOUS at 08:47

## 2021-03-27 RX ADMIN — MONTELUKAST 10 MG: 10 TABLET, FILM COATED ORAL at 22:14

## 2021-03-27 RX ADMIN — ACETAMINOPHEN 650 MG: 325 TABLET, FILM COATED ORAL at 22:13

## 2021-03-27 RX ADMIN — GABAPENTIN 600 MG: 300 CAPSULE ORAL at 13:40

## 2021-03-27 RX ADMIN — ALLOPURINOL 300 MG: 300 TABLET ORAL at 08:29

## 2021-03-27 RX ADMIN — DICLOFENAC SODIUM 2 G: 10 GEL TOPICAL at 12:25

## 2021-03-27 RX ADMIN — ACETAMINOPHEN 650 MG: 325 TABLET, FILM COATED ORAL at 16:44

## 2021-03-27 RX ADMIN — POTASSIUM CHLORIDE 40 MEQ: 750 TABLET, EXTENDED RELEASE ORAL at 19:47

## 2021-03-27 RX ADMIN — ISOSORBIDE DINITRATE 10 MG: 10 TABLET ORAL at 19:48

## 2021-03-27 RX ADMIN — WARFARIN SODIUM 7.5 MG: 7.5 TABLET ORAL at 18:04

## 2021-03-27 RX ADMIN — ISOSORBIDE DINITRATE 10 MG: 10 TABLET ORAL at 08:29

## 2021-03-27 RX ADMIN — Medication: at 02:48

## 2021-03-27 RX ADMIN — DICLOFENAC SODIUM 2 G: 10 GEL TOPICAL at 16:35

## 2021-03-27 RX ADMIN — GABAPENTIN 300 MG: 300 CAPSULE ORAL at 08:30

## 2021-03-27 RX ADMIN — ASPIRIN 81 MG CHEWABLE TABLET 81 MG: 81 TABLET CHEWABLE at 08:29

## 2021-03-27 RX ADMIN — HYDRALAZINE HYDROCHLORIDE 50 MG: 50 TABLET ORAL at 08:30

## 2021-03-27 RX ADMIN — POTASSIUM CHLORIDE 40 MEQ: 750 TABLET, EXTENDED RELEASE ORAL at 08:30

## 2021-03-27 RX ADMIN — BUPROPION HYDROCHLORIDE 100 MG: 100 TABLET, EXTENDED RELEASE ORAL at 19:47

## 2021-03-27 RX ADMIN — ACETAMINOPHEN 650 MG: 325 TABLET, FILM COATED ORAL at 02:47

## 2021-03-27 RX ADMIN — DICLOFENAC SODIUM 2 G: 10 GEL TOPICAL at 22:13

## 2021-03-27 ASSESSMENT — ACTIVITIES OF DAILY LIVING (ADL)
ADLS_ACUITY_SCORE: 12
ADLS_ACUITY_SCORE: 10

## 2021-03-27 ASSESSMENT — MIFFLIN-ST. JEOR: SCORE: 1788.28

## 2021-03-27 NOTE — PROGRESS NOTES
[facility]   Cardiology II Service / Advanced Heart Failure  Daily Progress Note  Date of Service: 3/27/2021      Patient: Jim Willingham  MRN: [id number]  Admission Date: 2/8/2021  Hospital Day # 47    Assessment and Plan: Jim Wililngham is a 65 year old male with history of NICM EF 20% c/b VT s/p CRT-D s/p HMII LVAD 6/19/2017 originally intended as destination therapy 2/2 obesity however with recent weight loss now candidate for transplantation. He is admitted for worsening functional status and renal function concerning for worsening heart failure. He is now listed status 2E for transplant.     Chronic systolic heart failure secondary to NICM s/p HM II LVAD. Echo 1/8/21 at 9400rpm, EF<30%m LVIDd 6.8cm, at least mildly reduced RV function, AoV closed without AI, mild-mod eccentric MR, dilated IVC without collapse. RHC 3/23 RA 10 mPA 25 mPCWP 14 Kee CO/CI 6.5/3.1.  Stage D, NYHA Class III  ACEi/ARB contraindicated due to renal dysfunction. Hydralazine 50 mg po TID. Isordil 10 mg po TID.   BB contraindicated due to low cardiac output  Aldosterone antagonist contraindicated due to renal dysfunction  SCD prophylaxis CRT-D  Fluid status: Mild hypervolemia. Bumex 4 mg in AM and 5 mg in the evening.   MAP: 72  LDH: 298 3/26  Anticoagulation: Coumadin per pharmacy. INR- 2.67, goal 2.5-3, goal increased in setting of power spikes and elevated flows this hospitalization.   Antiplatelet: ASA 81 mg po daily  - remains on the cardiac transplant list status 2E. Persistent escalation of diuretics in setting of progressive heart failure with refractory hypervolemia.      The patient's HeartMate LVAD was interrogated 3/27/2021  * Speed 9600 rpm   * Pulsatility index 5.1  * Power 6.7 Manuel   * Flow 6.2 L/minute   Fluid status: mild hypervolemia  Alarms were reviewed, and notable for PI events.  All external components were inspected and showed no evidence of damage or malfunction.    NSVT. Asymptomatic without  hemodynamic compromise. RHC 3/23 RA 10 mPA 25 mPCWP 14 Kee CO/CI 6.5/3.1.  - Diuresis as above.   - Lytes stable.   - Continue to monitor.     Right subconjunctival hemorrhage. Denies vision changes or headache.   - Continue to monitor.      WALTER on CKD Stage III. Cr peaked at 2.22.  - Cr-1.56 (1.57).     History of Afib. History of VT/VF. CHADSVASC-5.   - Continue Amiodarone 200 mg po daily.   - Coumadin as above.      DM type II, controlled. Symptomatic Hypoglycemia, resolved. Low Cortisol: Hgb A1C-6.2. Lantus and Novololg sliding scale insulin discontinued. Endo consult appreciated. C-peptide 13.9 an Proinsulin 18.4. Serum cortisol 7.7 3/6/21, cosyntropin stim test WNL per Endo.   -Per endo --> Fingerstick glucose might not be accurate for hypoglycemia, if patient has glucose <55 (without insulin), plasma glucose should be sent for confirmation of hypoglycemia prior to correction. While the patient is off insulin therapy, POC glucose readings above 60 are acceptable for the patient  - Reiterate need for serum glucose in setting of low BG fingerstick, discussed with Endo.      Chronic/resolved conditions  COPD/Asthma: pta Breo Ellipta, albuterol prn.   Depression: pta Bupropion  Right sided weakness, resolved. CT head negative for acute findings. Appreciate Neuro eval.   Staph Hominis Bacteremia. No need for surveillance blood cx per transplant ID.    Rhinovirus, resolved. Bacterial bronchitis. Completed 5 day course of Cefdinir. COVID negative 2/8. Resp PCR +rhinovirus. CXR 2/12 with no overt PNA. Stopped cefdinir 2/15. Cleared for transplant per ID. Repeat RVP negative 3/5. COVID repeated due to sinus congestion, negative. Congestion improved with transition from Claritin to Zyrtec.   Carpel tunnel, bilateral s/p bilateral release. Evidence of thenar atrophy. Relief with steroid injection 11/20. Carpal tunnel release 2/18/21. Appreciate Ortho/Plastics consult, signed off. Continue Tylenol 650 mg po QID and  "Gabapentin 300 mg in AM, 600 mg in the afternoon, and 600 mg in the evening.      FEN: 3 gram sodium diet   PROPHY:  Coumadin  LINES:  PIV   DISPO:  TBD pending cardiac transplant.   CODE STATUS: Full Code   ================================================================  Interval History/ROS: He complains of mild RESENDIZ and abdominal distention today, denies LE edema. He denies fever, chills, chest pain, palpitations, cough, nausea, vomiting, diarrhea, melena, hematochezia, and concerns at her drive line site. He is tolerating oral intake and ambulation.     Last 24 hr care team notes reviewed.   ROS:  4 point ROS including Respiratory, CV, GI and , other than that noted in the HPI, is negative.     Medications: Reviewed in EPIC.     Physical Exam:   BP 97/89 (BP Location: Left arm)   Pulse 69   Temp 97.9  F (36.6  C) (Oral)   Resp 16   Ht 1.727 m (5' 8\")   Wt 101.9 kg (224 lb 9.6 oz)   SpO2 97%   BMI 34.15 kg/m    GENERAL: Appears alert and oriented times three.   HEENT: Eye symmetrical and free of discharge bilaterally with scant erythematous to right conjutiva. Mucous membranes moist and without lesions.  NECK: Supple and without lymphadenopathy. JVD lower 1/3 of neck upright   CV: RRR, S1S2 present with LVAD hum  RESPIRATORY: Respirations regular, even, and unlabored. Lungs CTA throughout.   GI: Soft and mildly distended with normoactive bowel sounds present in all quadrants. No tenderness, rebound, guarding. No organomegaly.   EXTREMITIES: Trace bilateral LE peripheral edema. 2+ bilateral pedal pulses.   NEUROLOGIC: Alert and orientated x 3. CN II-XII grossly intact. No focal deficits.   MUSCULOSKELETAL: No joint swelling or tenderness.   SKIN: No jaundice. No rashes or lesions. LVAD drive line covered.     Data:  CMP  Recent Labs   Lab 03/27/21  0549 03/26/21  1905 03/26/21  0623 03/25/21  0525 03/22/21  0538 03/22/21  0538    135 137 137   < > 137   POTASSIUM 4.4 4.4 4.4 4.2   < > 4.1   CHLORIDE " 105 101 105 104   < > 105   CO2 25 29 26 27   < > 26   ANIONGAP 7 5 6 6   < > 6   GLC 93 146* 96 94   < > 116*   BUN 43* 37* 43* 42*   < > 52*   CR 1.56* 1.37* 1.57* 1.59*   < > 1.76*   GFRESTIMATED 46* 54* 46* 45*   < > 40*   GFRESTBLACK 54* 63 53* 52*   < > 46*   QAMAR 8.8 8.8 8.5 8.6   < > 8.8   MAG 2.6* 2.6* 2.6* 2.7*   < > 2.6*   PROTTOTAL  --   --   --   --   --  6.1*   ALBUMIN  --   --   --   --   --  3.3*   BILITOTAL  --   --   --   --   --  0.4   ALKPHOS  --   --   --   --   --  101   AST  --   --   --   --   --  30   ALT  --   --   --   --   --  36    < > = values in this interval not displayed.     CBC  Recent Labs   Lab 03/27/21  0549 03/25/21  0525 03/23/21  1035 03/23/21  0539 03/21/21  0608   WBC 4.1 4.0  --  4.4 4.1   RBC 3.89* 3.94*  --  3.74* 4.06*   HGB 11.8* 12.0* 11.9* 11.1* 12.0*   HCT 37.5* 38.0*  --  36.1* 39.2*   MCV 96 96  --  97 97   MCH 30.3 30.5  --  29.7 29.6   MCHC 31.5 31.6  --  30.7* 30.6*   RDW 16.8* 17.0*  --  17.2* 17.6*    200  --  171 187     INR  Recent Labs   Lab 03/27/21  0549 03/26/21  0623 03/25/21  0525 03/24/21  0538   INR 2.67* 2.62* 2.58* 2.59*       Patient discussed with Dr. Conte.     Soraya Marshall [facility]  3/27/2021

## 2021-03-27 NOTE — PLAN OF CARE
Pt admitted 2/8 with worsening overall function and worsening HF concern, now status 2E cardiac txp listed.  SR ( rarely Paced), VS'S on RA with b/l shoulder pain and medicated with prn Tylenol and scheduled Voltaren cream.  Bouts of questionable aberrant Afib (11 beats the longest at 140 bpm; prior thoughts that runs were VTach).  Pt glucose was 86 around 930 and ate multiple items.  Per pt, few bouts of hypoglycemia in late am.  Pt and I think it might be worthwhile to stagger his Bumex doses.  Otherwise, pt slept well and up independently.  Continue to monitor and with POC.

## 2021-03-27 NOTE — PLAN OF CARE
D: Admitted 2/8 for worsening functional status and renal function concerning for worsening HF and on status 2E for transplant. Hx of NICM EF 20% c/b VT s/p CRT-D s/p HeartMate 2 LVAD (2017), WALTER on CKD3, Afib, gout, and type 2 diabetes.     I: Monitored vitals and assessed pt status.   Changed:  Saline locked PIV  PRN: tylenol; bengay     A: A0x4, able to express needs. Room air, wears CPAP @ noc. LVAD #'s WDL, no alarms. Afebrile. Urinating adequately. Independent in room. Do not disturb orders from 1193-1410. Pain in shoulders, PRN tylenol and benjay for relief. On 3g Na diet.     P: Continue to monitor Pt status and report changes to Cards 2.

## 2021-03-28 LAB
ANION GAP SERPL CALCULATED.3IONS-SCNC: 3 MMOL/L (ref 3–14)
BUN SERPL-MCNC: 39 MG/DL (ref 7–30)
CALCIUM SERPL-MCNC: 8.6 MG/DL (ref 8.5–10.1)
CHLORIDE SERPL-SCNC: 102 MMOL/L (ref 94–109)
CO2 SERPL-SCNC: 31 MMOL/L (ref 20–32)
CREAT SERPL-MCNC: 1.6 MG/DL (ref 0.66–1.25)
GFR SERPL CREATININE-BSD FRML MDRD: 45 ML/MIN/{1.73_M2}
GLUCOSE BLDC GLUCOMTR-MCNC: 118 MG/DL (ref 70–99)
GLUCOSE BLDC GLUCOMTR-MCNC: 56 MG/DL (ref 70–99)
GLUCOSE BLDC GLUCOMTR-MCNC: 91 MG/DL (ref 70–99)
GLUCOSE BLDC GLUCOMTR-MCNC: 95 MG/DL (ref 70–99)
GLUCOSE BLDC GLUCOMTR-MCNC: 99 MG/DL (ref 70–99)
GLUCOSE SERPL-MCNC: 57 MG/DL (ref 70–99)
GLUCOSE SERPL-MCNC: 82 MG/DL (ref 70–99)
INR PPP: 2.45 (ref 0.86–1.14)
LABORATORY COMMENT REPORT: NORMAL
MAGNESIUM SERPL-MCNC: 2.7 MG/DL (ref 1.6–2.3)
POTASSIUM SERPL-SCNC: 4.1 MMOL/L (ref 3.4–5.3)
SARS-COV-2 RNA RESP QL NAA+PROBE: NEGATIVE
SODIUM SERPL-SCNC: 136 MMOL/L (ref 133–144)
SPECIMEN SOURCE: NORMAL

## 2021-03-28 PROCEDURE — 99233 SBSQ HOSP IP/OBS HIGH 50: CPT | Mod: 24 | Performed by: NURSE PRACTITIONER

## 2021-03-28 PROCEDURE — 250N000013 HC RX MED GY IP 250 OP 250 PS 637: Performed by: NURSE PRACTITIONER

## 2021-03-28 PROCEDURE — 93750 INTERROGATION VAD IN PERSON: CPT | Performed by: NURSE PRACTITIONER

## 2021-03-28 PROCEDURE — 250N000013 HC RX MED GY IP 250 OP 250 PS 637: Performed by: STUDENT IN AN ORGANIZED HEALTH CARE EDUCATION/TRAINING PROGRAM

## 2021-03-28 PROCEDURE — 36415 COLL VENOUS BLD VENIPUNCTURE: CPT | Performed by: NURSE PRACTITIONER

## 2021-03-28 PROCEDURE — 36415 COLL VENOUS BLD VENIPUNCTURE: CPT | Performed by: STUDENT IN AN ORGANIZED HEALTH CARE EDUCATION/TRAINING PROGRAM

## 2021-03-28 PROCEDURE — 250N000009 HC RX 250: Performed by: NURSE PRACTITIONER

## 2021-03-28 PROCEDURE — 82947 ASSAY GLUCOSE BLOOD QUANT: CPT | Performed by: NURSE PRACTITIONER

## 2021-03-28 PROCEDURE — 214N000001 HC R&B CCU UMMC

## 2021-03-28 PROCEDURE — 83735 ASSAY OF MAGNESIUM: CPT | Performed by: STUDENT IN AN ORGANIZED HEALTH CARE EDUCATION/TRAINING PROGRAM

## 2021-03-28 PROCEDURE — U0003 INFECTIOUS AGENT DETECTION BY NUCLEIC ACID (DNA OR RNA); SEVERE ACUTE RESPIRATORY SYNDROME CORONAVIRUS 2 (SARS-COV-2) (CORONAVIRUS DISEASE [COVID-19]), AMPLIFIED PROBE TECHNIQUE, MAKING USE OF HIGH THROUGHPUT TECHNOLOGIES AS DESCRIBED BY CMS-2020-01-R: HCPCS | Performed by: INTERNAL MEDICINE

## 2021-03-28 PROCEDURE — U0005 INFEC AGEN DETEC AMPLI PROBE: HCPCS | Performed by: INTERNAL MEDICINE

## 2021-03-28 PROCEDURE — 999N001017 HC STATISTIC GLUCOSE BY METER IP

## 2021-03-28 PROCEDURE — 85610 PROTHROMBIN TIME: CPT | Performed by: STUDENT IN AN ORGANIZED HEALTH CARE EDUCATION/TRAINING PROGRAM

## 2021-03-28 PROCEDURE — 99233 SBSQ HOSP IP/OBS HIGH 50: CPT | Mod: GC | Performed by: INTERNAL MEDICINE

## 2021-03-28 PROCEDURE — 250N000013 HC RX MED GY IP 250 OP 250 PS 637: Performed by: PHYSICIAN ASSISTANT

## 2021-03-28 PROCEDURE — 250N000013 HC RX MED GY IP 250 OP 250 PS 637: Performed by: INTERNAL MEDICINE

## 2021-03-28 PROCEDURE — 80048 BASIC METABOLIC PNL TOTAL CA: CPT | Performed by: STUDENT IN AN ORGANIZED HEALTH CARE EDUCATION/TRAINING PROGRAM

## 2021-03-28 RX ADMIN — MONTELUKAST 10 MG: 10 TABLET, FILM COATED ORAL at 21:48

## 2021-03-28 RX ADMIN — ALLOPURINOL 300 MG: 300 TABLET ORAL at 08:48

## 2021-03-28 RX ADMIN — POTASSIUM CHLORIDE 40 MEQ: 750 TABLET, EXTENDED RELEASE ORAL at 20:24

## 2021-03-28 RX ADMIN — HYDRALAZINE HYDROCHLORIDE 50 MG: 50 TABLET ORAL at 20:25

## 2021-03-28 RX ADMIN — Medication: at 13:57

## 2021-03-28 RX ADMIN — GABAPENTIN 300 MG: 300 CAPSULE ORAL at 08:50

## 2021-03-28 RX ADMIN — WARFARIN SODIUM 9 MG: 5 TABLET ORAL at 18:25

## 2021-03-28 RX ADMIN — CETIRIZINE HYDROCHLORIDE 10 MG: 10 TABLET, FILM COATED ORAL at 08:49

## 2021-03-28 RX ADMIN — ASPIRIN 81 MG CHEWABLE TABLET 81 MG: 81 TABLET CHEWABLE at 08:49

## 2021-03-28 RX ADMIN — BUPROPION HYDROCHLORIDE 100 MG: 100 TABLET, EXTENDED RELEASE ORAL at 20:24

## 2021-03-28 RX ADMIN — ISOSORBIDE DINITRATE 10 MG: 10 TABLET ORAL at 13:46

## 2021-03-28 RX ADMIN — ACETAMINOPHEN 650 MG: 325 TABLET, FILM COATED ORAL at 21:48

## 2021-03-28 RX ADMIN — Medication: at 20:25

## 2021-03-28 RX ADMIN — HYDRALAZINE HYDROCHLORIDE 50 MG: 50 TABLET ORAL at 08:48

## 2021-03-28 RX ADMIN — AMIODARONE HYDROCHLORIDE 200 MG: 200 TABLET ORAL at 08:48

## 2021-03-28 RX ADMIN — DOCUSATE SODIUM 50 MG AND SENNOSIDES 8.6 MG 1 TABLET: 8.6; 5 TABLET, FILM COATED ORAL at 20:24

## 2021-03-28 RX ADMIN — BUPROPION HYDROCHLORIDE 100 MG: 100 TABLET, EXTENDED RELEASE ORAL at 08:48

## 2021-03-28 RX ADMIN — ISOSORBIDE DINITRATE 10 MG: 10 TABLET ORAL at 08:48

## 2021-03-28 RX ADMIN — BUMETANIDE 5 MG: 2 TABLET ORAL at 13:47

## 2021-03-28 RX ADMIN — POTASSIUM CHLORIDE 40 MEQ: 750 TABLET, EXTENDED RELEASE ORAL at 08:50

## 2021-03-28 RX ADMIN — ACETAMINOPHEN 650 MG: 325 TABLET, FILM COATED ORAL at 13:56

## 2021-03-28 RX ADMIN — GABAPENTIN 600 MG: 300 CAPSULE ORAL at 13:45

## 2021-03-28 RX ADMIN — ALBUTEROL SULFATE 2 PUFF: 90 AEROSOL, METERED RESPIRATORY (INHALATION) at 08:51

## 2021-03-28 RX ADMIN — BUMETANIDE 4 MG: 2 TABLET ORAL at 08:48

## 2021-03-28 RX ADMIN — HYDRALAZINE HYDROCHLORIDE 50 MG: 50 TABLET ORAL at 13:46

## 2021-03-28 RX ADMIN — FLUTICASONE FUROATE AND VILANTEROL TRIFENATATE 1 PUFF: 100; 25 POWDER RESPIRATORY (INHALATION) at 08:50

## 2021-03-28 RX ADMIN — ISOSORBIDE DINITRATE 10 MG: 10 TABLET ORAL at 20:24

## 2021-03-28 RX ADMIN — Medication 10 MG: at 23:21

## 2021-03-28 RX ADMIN — UMECLIDINIUM 1 PUFF: 62.5 AEROSOL, POWDER ORAL at 08:51

## 2021-03-28 RX ADMIN — GABAPENTIN 600 MG: 300 CAPSULE ORAL at 21:48

## 2021-03-28 RX ADMIN — ANAKINRA 100 MG: 100 INJECTION, SOLUTION SUBCUTANEOUS at 08:51

## 2021-03-28 ASSESSMENT — ACTIVITIES OF DAILY LIVING (ADL)
ADLS_ACUITY_SCORE: 14

## 2021-03-28 ASSESSMENT — MIFFLIN-ST. JEOR: SCORE: 1777.39

## 2021-03-28 NOTE — PROVIDER NOTIFICATION
Cards 2 notified of pAfib run of 10 beats,  (highest) around 0629. also having multiple runs that are short and frequent.     Yohana Ramirez RN

## 2021-03-28 NOTE — PLAN OF CARE
D/He feels very fatigued today., and has for past several days, and tells me team is aware. VAD numbers okay. He says his wrists are okay today. Ate well, BS 91, he wanted [place] to both shoulders late tonight. He is sitting up reading, on computer and talking on phone to his family  A/he looks tired today  P/monitor for changes, keep team updated.

## 2021-03-28 NOTE — PROGRESS NOTES
"Endocrine Progress Note:          Assessment/Plan:     Jim Willingham is a 65 year old male with PMHx of non-ischemic cardiomyopathy with EF 20% c/b VT s/p CRT-D, placement of LVAD on 6/19/2017, chronic kidney disease stage III, type II diabetes, atrial fibrillation, COPD, depression, gastric bypass in 2003. Admitted with worsening of functional status, WALTER. on CKD,  with concern for worsening heart failure, he is now a transplant candidate and has refractory hypervolemia.     # Concern for hypoglycemia, in a patient with history of type II diabetes:  # History of bariatric surgery 2003, 363 lbs down to 220 lbs, then back to 260 lbs, now 222 lbs - total lost 140 lbs    It appears he has postbariatric hypoglycemia based on his history, diary of symptoms that he is paying attention lately, timing and his self experimentation with mixed meal for breakfast on Friday and Sunday.     Of note, other causes were worked up in initial consult by our team. \"Last HbA1c was 6.2% on 1/7/21. He has not received insulin glargine since 2/10.Last dose of insulin aspart was on 3/01  C-peptide and proinsulin levels were checked for assessment of hypoglycemia, 13.9 and 18.4 respectively (the patient was normoglycemic with post prandial plasma glucose of 84 at that time- after treatment for hypoglycemia). Adrenal insufficiency ruled out (see below)\"     Various treatment options discussed today - Diet change, acarbose, GLP agonist, he wants to try diet change, if not working then to try other option.    Plan:    - Diet modification has been suggested to patient to avoid post prandial hypoglycemia  - Eat food with more fibres. Avoid meals with high carbs, nutrition consult requested to suggest diet choices that are lower carb (about  grams per day)    # Low AM cortisol.  History of prolonged corticosteroid use (recently discontinued)  Had been on Prednisone 20 mg daily for about 7 years (for management of pain secondary to carpal " "tunnel syndrome), tapered down from Sep 2020 and stopped in Feb 2021    AM cortisol less than 10  cosyntropin stimulation test (250 mcg) done 3/8 showed appropriate stimulation of adrenals.               Baseline cortisol 7.4             Cortisol at 60 min 23.9     Endocrine team will sign off.    Please do not hesitate to contact us if these measures doesn't help him.      Patient is staffed with Dr.Chow Guru Alriio MD  PGY 5 - Endocrinology Fellow  Pager: [phone]  Call ** [phone] then enter job code [phone] for on call person.    40 minutes spent on pt on the day of the encounter including documentation time, chart review, discussion with pt, and coordination of care    I have seen and examined the patient by telephone visit  and agree with the fellow's plan of care as noted.  March 28, 2021    Dr. Michelle Espitia [phone]        Interval History:     He is a respiratory therapist by occupation. Since last endocrine consult/visit and recommendations, he is paying attention to food that he is eating. Today, he ate 2 pancakes, 2 lee strips,  One glass oranje juice, 2 cups maple syrup and butter, ~7.30am. Aound 3 hours later, Ache in stomach, mild headache, told RN, checked glucose by fingerstick and serum glucose which is 56mg. He notices only after breakfast, not with lunch and dinner which has more vegetables, meat.     He ordered similar food few days ago, he felt same symptoms which is on Friday. He tried same food today to experiment it and confirm if this comes after eating food or not. At home, when it happens, takes peppermint, butterfinger candybar.    Orders Placed This Encounter      Combination Diet 3 gm NA Diet    ROS:    Review of pertinent systems was negative except as noted above.          Exam:      Blood pressure 94/78, pulse 77, temperature 97.9  F (36.6  C), temperature source Oral, resp. rate 16, height 1.727 m (5' 8\"), weight 100.8 kg (222 lb 3.2 oz), SpO2 97 %.    General: Alert, communicating " clearly.          Data:     Lab Results   Component Value Date    A1C 6.2 01/07/2021    A1C 5.7 11/05/2020    A1C 7.5 08/05/2020    A1C 7.0 01/21/2020    A1C 6.7 05/24/2017       Recent Labs   Lab 03/28/21  1349 03/28/21  1052 03/28/21  1049 03/28/21  1037 03/28/21  0627 03/27/21  2140 03/27/21  1747 03/27/21  1207 03/27/21  0549 03/26/21  1905 03/26/21  1905 03/26/21  0623 03/26/21  0623 03/25/21  0525 03/25/21  0525   GLC  --   --  57*  --  82  --   --   --  93  --  146*  --  96  --  94   BGM 95 118*  --  56*  --  122* 133* 139*  --    < >  --    < >  --    < >  --     < > = values in this interval not displayed.

## 2021-03-28 NOTE — PLAN OF CARE
"VSS. SR, with intermittent periods with frequent runs of SVT. Patient reported feeling lightheaded and \"off\" this morning following episode when he was having frequent short runs of SVT. At 1030 patient requested to have blood sugar level checked d/t feeling low. Had eaten bkfst at 7-730am (2 pancakes with 2 maple syrup packages and one smart balance buttery spread, side of lee, OJ, and a fruit cup, ).  Glucometer reading 56. serum level drawn and result 57. Patient consumed apple and apple juice, recheck 118. Plan for pacemaker interrogation and endocrinology consulted.   "

## 2021-03-28 NOTE — PROGRESS NOTES
[facility]   Cardiology II Service / Advanced Heart Failure  Daily Progress Note  Date of Service: 3/28/2021      Patient: Jim Willingham  MRN: [id number]  Admission Date: 2/8/2021  Hospital Day # 48    Assessment and Plan: Jim Willingham is a 65 year old male with history of NICM EF 20% c/b VT s/p CRT-D s/p HMII LVAD 6/19/2017 originally intended as destination therapy 2/2 obesity however with recent weight loss now candidate for transplantation. He is admitted for worsening functional status and renal function concerning for worsening heart failure. He is now listed status 2E for transplant.     Chronic systolic heart failure secondary to NICM s/p HM II LVAD. Echo 1/8/21 at 9400rpm, EF<30%m LVIDd 6.8cm, at least mildly reduced RV function, AoV closed without AI, mild-mod eccentric MR, dilated IVC without collapse. RHC 3/23 RA 10 mPA 25 mPCWP 14 Kee CO/CI 6.5/3.1.  Stage D, NYHA Class III  ACEi/ARB contraindicated due to renal dysfunction. Hydralazine 50 mg po TID. Isordil 10 mg po TID.   BB contraindicated due to low cardiac output  Aldosterone antagonist contraindicated due to renal dysfunction  SCD prophylaxis CRT-D  Fluid status: Near euvolemic state. Continue Bumex 4 mg po BID.   MAP: 73  LDH: 298 3/26  Anticoagulation: Coumadin per pharmacy. INR- 2.45, goal 2.5-3, goal increased in setting of power spikes and elevated flows this hospitalization.   Antiplatelet: ASA 81 mg po daily  - remains on the cardiac transplant list status 2E. Persistent escalation of diuretics in setting of progressive heart failure with refractory hypervolemia.      The patient's HeartMate LVAD was interrogated 3/28/2021  * Speed 9600 rpm   * Pulsatility index 4.6  * Power 6.9 Manuel   * Flow 6.7 L/minute   Fluid status: mild hypervolemia  Alarms were reviewed, and notable for PI events.  All external components were inspected and showed no evidence of damage or malfunction.     Right subconjunctival hemorrhage. Denies  vision changes or headache.   - Continue to monitor.      WALTER on CKD Stage III. Cr peaked at 2.22.  - Cr-1.6 (1.56).     History of Afib. History of VT/VF. Asymptomatic bursts of questionable AF vs NSVT without hemodynamic compromise. RHC 3/23 RA 10 mPA 25 mPCWP 14 Kee CO/CI 6.5/3.1. CHADSVASC-5.   - Continue Amiodarone 200 mg po daily.   - Coumadin as above.   - Device interrogation given increased frequency in arrhythmia. Appears to be NSVT on my interpretation.      DM type II, controlled. Symptomatic Hypoglycemia, resolved. Low Cortisol: Hgb A1C-6.2. Lantus and Novololg sliding scale insulin discontinued. Endo consult appreciated. C-peptide 13.9 an Proinsulin 18.4. Serum cortisol 7.7 3/6/21, cosyntropin stim test WNL per Endo.   -Per endo --> Fingerstick glucose might not be accurate for hypoglycemia, if patient has glucose <55 (without insulin), plasma glucose should be sent for confirmation of hypoglycemia prior to correction. While the patient is off insulin therapy, POC glucose readings above 60 are acceptable for the patient  - Serum glucose confirmation of hypoglycemia of 57 this AM, pt symptomatic. Endo notified.      Chronic/resolved conditions  COPD/Asthma: pta Breo Ellipta, albuterol prn.   Depression: pta Bupropion  Right sided weakness, resolved. CT head negative for acute findings. Appreciate Neuro eval.   Staph Hominis Bacteremia. No need for surveillance blood cx per transplant ID.    Rhinovirus, resolved. Bacterial bronchitis. Completed 5 day course of Cefdinir. COVID negative 2/8. Resp PCR +rhinovirus. CXR 2/12 with no overt PNA. Stopped cefdinir 2/15. Cleared for transplant per ID. Repeat RVP negative 3/5. COVID repeated due to sinus congestion, negative. Congestion improved with transition from Claritin to Zyrtec.   Carpel tunnel, bilateral s/p bilateral release. Evidence of thenar atrophy. Relief with steroid injection 11/20. Carpal tunnel release 2/18/21. Appreciate Ortho/Plastics consult,  "signed off. Continue Tylenol 650 mg po QID and Gabapentin 300 mg in AM, 600 mg in the afternoon, and 600 mg in the evening.      FEN: 3 gram sodium diet   PROPHY:  Coumadin  LINES:  PIV   DISPO:  TBD pending cardiac transplant.   CODE STATUS: Full Code   ================================================================  Interval History/ROS: He complains of dizziness this AM. He denies fever, chills, chest pain, palpitations, cough, nausea, vomiting, diarrhea, melena, hematochezia, abdominal distention, RESENDIZ, and LE edema. He has no concerns at his drive line site. He is tolerating oral intake and ambulation.     Last 24 hr care team notes reviewed.   ROS:  4 point ROS including Respiratory, CV, GI and , other than that noted in the HPI, is negative.     Medications: Reviewed in EPIC.     Physical Exam:   BP (!) 74/62 (BP Location: Left arm)   Pulse 73   Temp 97.4  F (36.3  C) (Oral)   Resp 16   Ht 1.727 m (5' 8\")   Wt 100.8 kg (222 lb 3.2 oz)   SpO2 98%   BMI 33.79 kg/m    GENERAL: Appears alert and oriented times three.   HEENT: Eye symmetrical and free of discharge bilaterally. Mucous membranes moist and without lesions.  NECK: Supple and without lymphadenopathy. JVD 10 cm.   CV: RRR, S1S2 present with LVAD hum.   RESPIRATORY: Respirations regular, even, and unlabored. Lungs CTA throughout.   GI: Soft and non distended with normoactive bowel sounds present in all quadrants. No tenderness, rebound, guarding. No organomegaly.   EXTREMITIES: Trace bilateral LE peripheral edema. 2+ bilateral pedal pulses.   NEUROLOGIC: Alert and orientated x 3. CN II-XII grossly intact. No focal deficits.   MUSCULOSKELETAL: No joint swelling or tenderness.   SKIN: No jaundice. No rashes or lesions. LVAD drive line covered.     Data:  CMP  Recent Labs   Lab 03/28/21  1049 03/28/21  0627 03/27/21  0549 03/26/21  1905 03/26/21  0623 03/22/21  0538 03/22/21  0538   NA  --  136 137 135 137   < > 137   POTASSIUM  --  4.1 4.4 4.4 4.4 "   < > 4.1   CHLORIDE  --  102 105 101 105   < > 105   CO2  --  31 25 29 26   < > 26   ANIONGAP  --  3 7 5 6   < > 6   GLC 57* 82 93 146* 96   < > 116*   BUN  --  39* 43* 37* 43*   < > 52*   CR  --  1.60* 1.56* 1.37* 1.57*   < > 1.76*   GFRESTIMATED  --  45* 46* 54* 46*   < > 40*   GFRESTBLACK  --  52* 54* 63 53*   < > 46*   QAMAR  --  8.6 8.8 8.8 8.5   < > 8.8   MAG  --  2.7* 2.6* 2.6* 2.6*   < > 2.6*   PROTTOTAL  --   --   --   --   --   --  6.1*   ALBUMIN  --   --   --   --   --   --  3.3*   BILITOTAL  --   --   --   --   --   --  0.4   ALKPHOS  --   --   --   --   --   --  101   AST  --   --   --   --   --   --  30   ALT  --   --   --   --   --   --  36    < > = values in this interval not displayed.     CBC  Recent Labs   Lab 03/27/21  0549 03/25/21  0525 03/23/21  1035 03/23/21  0539   WBC 4.1 4.0  --  4.4   RBC 3.89* 3.94*  --  3.74*   HGB 11.8* 12.0* 11.9* 11.1*   HCT 37.5* 38.0*  --  36.1*   MCV 96 96  --  97   MCH 30.3 30.5  --  29.7   MCHC 31.5 31.6  --  30.7*   RDW 16.8* 17.0*  --  17.2*    200  --  171     INR  Recent Labs   Lab 03/28/21  0627 03/27/21  0549 03/26/21  0623 03/25/21  0525   INR 2.45* 2.67* 2.62* 2.58*       Patient discussed with Dr. Conte.      Soraya Marshall [facility]  3/28/2021

## 2021-03-28 NOTE — PLAN OF CARE
D: Admitted 2/8 for worsening functional status and renal function concerning for worsening HF and on status 2E for transplant. Hx of NICM EF 20% c/b VT s/p CRT-D s/p HeartMate 2 LVAD (2017), WALTER on CKD3, Afib, gout, and type 2 diabetes.     I: Monitored vitals and assessed pt status.   Changed:  Saline locked PIV   PRN: tylenol; robaxin; melatonin     A: A0x4, able to express needs. SR w/ 1st degree AVB. Room air, wears CPAP @ night. Afebrile. LVAD #'s WDL, no alarms overnight. Urinating adequately. Blood glucose 122 @ HS. Do not disturb orders from [id number]. Up independent/SBA in halls. Last BM 3/27. Pain in bilateral shoulders, using PRN tylenol and robaxin, along with scheduled pain creams. 3g sodium diet.     P: Continue to monitor Pt status and report changes to Cards 2.

## 2021-03-29 ENCOUNTER — APPOINTMENT (OUTPATIENT)
Dept: PHYSICAL THERAPY | Facility: CLINIC | Age: 66
DRG: 001 | End: 2021-03-29
Attending: INTERNAL MEDICINE
Payer: COMMERCIAL

## 2021-03-29 ENCOUNTER — ANCILLARY PROCEDURE (OUTPATIENT)
Dept: CARDIOLOGY | Facility: CLINIC | Age: 66
DRG: 001 | End: 2021-03-29
Attending: NURSE PRACTITIONER
Payer: COMMERCIAL

## 2021-03-29 LAB
ALBUMIN SERPL-MCNC: 3.2 G/DL (ref 3.4–5)
ALP SERPL-CCNC: 98 U/L (ref 40–150)
ALT SERPL W P-5'-P-CCNC: 34 U/L (ref 0–70)
ANION GAP SERPL CALCULATED.3IONS-SCNC: 5 MMOL/L (ref 3–14)
AST SERPL W P-5'-P-CCNC: 32 U/L (ref 0–45)
BILIRUB DIRECT SERPL-MCNC: <0.1 MG/DL (ref 0–0.2)
BILIRUB SERPL-MCNC: 0.4 MG/DL (ref 0.2–1.3)
BUN SERPL-MCNC: 38 MG/DL (ref 7–30)
CALCIUM SERPL-MCNC: 8.5 MG/DL (ref 8.5–10.1)
CHLORIDE SERPL-SCNC: 104 MMOL/L (ref 94–109)
CO2 SERPL-SCNC: 28 MMOL/L (ref 20–32)
CREAT SERPL-MCNC: 1.56 MG/DL (ref 0.66–1.25)
ERYTHROCYTE [DISTWIDTH] IN BLOOD BY AUTOMATED COUNT: 16.6 % (ref 10–15)
GFR SERPL CREATININE-BSD FRML MDRD: 46 ML/MIN/{1.73_M2}
GLUCOSE BLDC GLUCOMTR-MCNC: 134 MG/DL (ref 70–99)
GLUCOSE SERPL-MCNC: 95 MG/DL (ref 70–99)
HCT VFR BLD AUTO: 36.2 % (ref 40–53)
HGB BLD-MCNC: 11.2 G/DL (ref 13.3–17.7)
INR PPP: 2.6 (ref 0.86–1.14)
MAGNESIUM SERPL-MCNC: 2.6 MG/DL (ref 1.6–2.3)
MCH RBC QN AUTO: 29.9 PG (ref 26.5–33)
MCHC RBC AUTO-ENTMCNC: 30.9 G/DL (ref 31.5–36.5)
MCV RBC AUTO: 97 FL (ref 78–100)
PLATELET # BLD AUTO: 174 10E9/L (ref 150–450)
POTASSIUM SERPL-SCNC: 4.4 MMOL/L (ref 3.4–5.3)
PROT SERPL-MCNC: 6.2 G/DL (ref 6.8–8.8)
RBC # BLD AUTO: 3.74 10E12/L (ref 4.4–5.9)
SODIUM SERPL-SCNC: 137 MMOL/L (ref 133–144)
WBC # BLD AUTO: 4.1 10E9/L (ref 4–11)

## 2021-03-29 PROCEDURE — 250N000013 HC RX MED GY IP 250 OP 250 PS 637: Performed by: NURSE PRACTITIONER

## 2021-03-29 PROCEDURE — 250N000013 HC RX MED GY IP 250 OP 250 PS 637: Performed by: PHYSICIAN ASSISTANT

## 2021-03-29 PROCEDURE — 99232 SBSQ HOSP IP/OBS MODERATE 35: CPT | Mod: 24 | Performed by: NURSE PRACTITIONER

## 2021-03-29 PROCEDURE — 214N000001 HC R&B CCU UMMC

## 2021-03-29 PROCEDURE — 250N000013 HC RX MED GY IP 250 OP 250 PS 637: Performed by: STUDENT IN AN ORGANIZED HEALTH CARE EDUCATION/TRAINING PROGRAM

## 2021-03-29 PROCEDURE — 93284 PRGRMG EVAL IMPLANTABLE DFB: CPT | Mod: 26 | Performed by: INTERNAL MEDICINE

## 2021-03-29 PROCEDURE — 80048 BASIC METABOLIC PNL TOTAL CA: CPT | Performed by: STUDENT IN AN ORGANIZED HEALTH CARE EDUCATION/TRAINING PROGRAM

## 2021-03-29 PROCEDURE — 250N000009 HC RX 250: Performed by: NURSE PRACTITIONER

## 2021-03-29 PROCEDURE — 999N001017 HC STATISTIC GLUCOSE BY METER IP

## 2021-03-29 PROCEDURE — 80076 HEPATIC FUNCTION PANEL: CPT | Performed by: STUDENT IN AN ORGANIZED HEALTH CARE EDUCATION/TRAINING PROGRAM

## 2021-03-29 PROCEDURE — 85610 PROTHROMBIN TIME: CPT | Performed by: STUDENT IN AN ORGANIZED HEALTH CARE EDUCATION/TRAINING PROGRAM

## 2021-03-29 PROCEDURE — 93284 PRGRMG EVAL IMPLANTABLE DFB: CPT

## 2021-03-29 PROCEDURE — 250N000013 HC RX MED GY IP 250 OP 250 PS 637: Performed by: INTERNAL MEDICINE

## 2021-03-29 PROCEDURE — 36415 COLL VENOUS BLD VENIPUNCTURE: CPT | Performed by: STUDENT IN AN ORGANIZED HEALTH CARE EDUCATION/TRAINING PROGRAM

## 2021-03-29 PROCEDURE — 93750 INTERROGATION VAD IN PERSON: CPT | Performed by: NURSE PRACTITIONER

## 2021-03-29 PROCEDURE — 83735 ASSAY OF MAGNESIUM: CPT | Performed by: STUDENT IN AN ORGANIZED HEALTH CARE EDUCATION/TRAINING PROGRAM

## 2021-03-29 PROCEDURE — 85027 COMPLETE CBC AUTOMATED: CPT | Performed by: STUDENT IN AN ORGANIZED HEALTH CARE EDUCATION/TRAINING PROGRAM

## 2021-03-29 RX ORDER — WARFARIN SODIUM 7.5 MG/1
7.5 TABLET ORAL
Status: COMPLETED | OUTPATIENT
Start: 2021-03-29 | End: 2021-03-29

## 2021-03-29 RX ADMIN — GABAPENTIN 300 MG: 300 CAPSULE ORAL at 08:32

## 2021-03-29 RX ADMIN — HYDRALAZINE HYDROCHLORIDE 50 MG: 50 TABLET ORAL at 14:21

## 2021-03-29 RX ADMIN — AMIODARONE HYDROCHLORIDE 200 MG: 200 TABLET ORAL at 08:32

## 2021-03-29 RX ADMIN — METHOCARBAMOL 500 MG: 500 TABLET, FILM COATED ORAL at 06:09

## 2021-03-29 RX ADMIN — Medication: at 14:49

## 2021-03-29 RX ADMIN — BUMETANIDE 5 MG: 2 TABLET ORAL at 08:42

## 2021-03-29 RX ADMIN — ALLOPURINOL 300 MG: 300 TABLET ORAL at 08:31

## 2021-03-29 RX ADMIN — HYDRALAZINE HYDROCHLORIDE 50 MG: 50 TABLET ORAL at 08:32

## 2021-03-29 RX ADMIN — ISOSORBIDE DINITRATE 10 MG: 10 TABLET ORAL at 14:21

## 2021-03-29 RX ADMIN — ISOSORBIDE DINITRATE 10 MG: 10 TABLET ORAL at 19:36

## 2021-03-29 RX ADMIN — UMECLIDINIUM 1 PUFF: 62.5 AEROSOL, POWDER ORAL at 08:33

## 2021-03-29 RX ADMIN — Medication: at 19:37

## 2021-03-29 RX ADMIN — DICLOFENAC SODIUM 2 G: 10 GEL TOPICAL at 12:21

## 2021-03-29 RX ADMIN — WARFARIN SODIUM 7.5 MG: 7.5 TABLET ORAL at 18:04

## 2021-03-29 RX ADMIN — ISOSORBIDE DINITRATE 10 MG: 10 TABLET ORAL at 08:31

## 2021-03-29 RX ADMIN — ACETAMINOPHEN 650 MG: 325 TABLET, FILM COATED ORAL at 21:11

## 2021-03-29 RX ADMIN — BUMETANIDE 5 MG: 2 TABLET ORAL at 14:21

## 2021-03-29 RX ADMIN — DICLOFENAC SODIUM 2 G: 10 GEL TOPICAL at 21:11

## 2021-03-29 RX ADMIN — DICLOFENAC SODIUM 2 G: 10 GEL TOPICAL at 16:04

## 2021-03-29 RX ADMIN — GABAPENTIN 600 MG: 300 CAPSULE ORAL at 21:10

## 2021-03-29 RX ADMIN — GABAPENTIN 600 MG: 300 CAPSULE ORAL at 14:21

## 2021-03-29 RX ADMIN — Medication 10 MG: at 22:40

## 2021-03-29 RX ADMIN — BUPROPION HYDROCHLORIDE 100 MG: 100 TABLET, EXTENDED RELEASE ORAL at 19:36

## 2021-03-29 RX ADMIN — MONTELUKAST 10 MG: 10 TABLET, FILM COATED ORAL at 21:10

## 2021-03-29 RX ADMIN — ASPIRIN 81 MG CHEWABLE TABLET 81 MG: 81 TABLET CHEWABLE at 08:31

## 2021-03-29 RX ADMIN — DICLOFENAC SODIUM 2 G: 10 GEL TOPICAL at 08:32

## 2021-03-29 RX ADMIN — DOCUSATE SODIUM 50 MG AND SENNOSIDES 8.6 MG 1 TABLET: 8.6; 5 TABLET, FILM COATED ORAL at 19:36

## 2021-03-29 RX ADMIN — BUPROPION HYDROCHLORIDE 100 MG: 100 TABLET, EXTENDED RELEASE ORAL at 08:31

## 2021-03-29 RX ADMIN — HYDRALAZINE HYDROCHLORIDE 50 MG: 50 TABLET ORAL at 19:35

## 2021-03-29 RX ADMIN — METHOCARBAMOL 500 MG: 500 TABLET, FILM COATED ORAL at 21:11

## 2021-03-29 RX ADMIN — ACETAMINOPHEN 650 MG: 325 TABLET, FILM COATED ORAL at 06:09

## 2021-03-29 RX ADMIN — ACETAMINOPHEN 650 MG: 325 TABLET, FILM COATED ORAL at 14:50

## 2021-03-29 RX ADMIN — POTASSIUM CHLORIDE 40 MEQ: 750 TABLET, EXTENDED RELEASE ORAL at 19:35

## 2021-03-29 RX ADMIN — ALBUTEROL SULFATE 2 PUFF: 90 AEROSOL, METERED RESPIRATORY (INHALATION) at 08:33

## 2021-03-29 RX ADMIN — CETIRIZINE HYDROCHLORIDE 10 MG: 10 TABLET, FILM COATED ORAL at 08:32

## 2021-03-29 RX ADMIN — POTASSIUM CHLORIDE 40 MEQ: 750 TABLET, EXTENDED RELEASE ORAL at 08:31

## 2021-03-29 RX ADMIN — ANAKINRA 100 MG: 100 INJECTION, SOLUTION SUBCUTANEOUS at 08:42

## 2021-03-29 RX ADMIN — FLUTICASONE FUROATE AND VILANTEROL TRIFENATATE 1 PUFF: 100; 25 POWDER RESPIRATORY (INHALATION) at 08:33

## 2021-03-29 ASSESSMENT — ACTIVITIES OF DAILY LIVING (ADL)
ADLS_ACUITY_SCORE: 11
ADLS_ACUITY_SCORE: 12
ADLS_ACUITY_SCORE: 11
ADLS_ACUITY_SCORE: 14
ADLS_ACUITY_SCORE: 12
ADLS_ACUITY_SCORE: 11

## 2021-03-29 ASSESSMENT — MIFFLIN-ST. JEOR: SCORE: 1791.91

## 2021-03-29 NOTE — PROGRESS NOTES
D: stopped in patient room for routine VAD Coordinator rounding. No VAD related questions or concerns at this time.  I: Discussed POC and provided support and listened to patient and care giver's thoughts and concerns.  P: Continue to follow patient and address any questions or concerns patient and or caregiver may have.

## 2021-03-29 NOTE — PROGRESS NOTES
[facility]   Cardiology II Service / Advanced Heart Failure  Daily Progress Note  Date of Service: 3/29/2021      Patient: Jim Willingham  MRN: [id number]  Admission Date: 2/8/2021  Hospital Day # 49    Assessment and Plan: Jim Willingham is a 65 year old male with history of NICM EF 20% c/b VT s/p CRT-D s/p HMII LVAD 6/19/2017 originally intended as destination therapy 2/2 obesity however with recent weight loss now candidate for transplantation. He is admitted for worsening functional status and renal function concerning for worsening heart failure. He is now listed status 2E for transplant.     Chronic systolic heart failure secondary to NICM s/p HM II LVAD. Echo 1/8/21 at 9400rpm, EF<30%m LVIDd 6.8cm, at least mildly reduced RV function, AoV closed without AI, mild-mod eccentric MR, dilated IVC without collapse. RHC 3/23 RA 10 mPA 25 mPCWP 14 Kee CO/CI 6.5/3.1.  Stage D, NYHA Class III  ACEi/ARB contraindicated due to renal dysfunction. Hydralazine 50 mg po TID. Isordil 10 mg po TID.   BB contraindicated due to low cardiac output  Aldosterone antagonist contraindicated due to renal dysfunction  SCD prophylaxis CRT-D  Fluid status: Mild hypervolemia. Increase Bumex to 5 mg po BID.   MAP: 80  LDH: 298 3/26  Anticoagulation: Coumadin per pharmacy. INR- 2.6, goal 2.5-3, goal increased in setting of power spikes and elevated flows this hospitalization.   Antiplatelet: ASA 81 mg po daily  - remains on the cardiac transplant list status 2E. Persistent escalation of diuretics in setting of progressive heart failure with refractory hypervolemia.   - Encouraged activity.      The patient's HeartMate LVAD was interrogated 3/29/2021  * Speed 9600 rpm   * Pulsatility index 3.8  * Power 7 Manuel   * Flow 6.9 L/minute   Fluid status: mild hypervolemia.   Alarms were reviewed, and notable for PI events.   All external components were inspected and showed no evidence of damage or malfunction.     Right  subconjunctival hemorrhage, resolved. Denies vision changes or headache.   - Continue to monitor.      WALTER on CKD Stage III. Cr peaked at 2.22.  - Cr-1.56 (1.6).     History of Afib. History of VT/VF. Asymptomatic bursts of questionable AF vs NSVT without hemodynamic compromise. RHC 3/23 RA 10 mPA 25 mPCWP 14 Kee CO/CI 6.5/3.1. CHADSVASC-5.   - Continue Amiodarone 200 mg po daily.   - Coumadin as above.   - Device interrogation given increased frequency in arrhythmia. Appears to be NSVT on my interpretation.      DM type II, controlled. Symptomatic Hypoglycemia, resolved. Low Cortisol: Hgb A1C-6.2. Lantus and Novololg sliding scale insulin discontinued. Endo consult appreciated. C-peptide 13.9 an Proinsulin 18.4. Serum cortisol 7.7 3/6/21, cosyntropin stim test WNL per Endo.   -Per endo --> Fingerstick glucose might not be accurate for hypoglycemia, if patient has glucose <55 (without insulin), plasma glucose should be sent for confirmation of hypoglycemia prior to correction. While the patient is off insulin therapy, POC glucose readings above 60 are acceptable for the patient  - Serum glucose confirmation of hypoglycemia of 57 3/29/21  pt symptomatic. Endo notified and recommended dietary modifications.      Chronic/resolved conditions  COPD/Asthma: pta Breo Ellipta, albuterol prn.   Depression: pta Bupropion  Right sided weakness, resolved. CT head negative for acute findings. Appreciate Neuro eval.   Staph Hominis Bacteremia. No need for surveillance blood cx per transplant ID.    Rhinovirus, resolved. Bacterial bronchitis. Completed 5 day course of Cefdinir. COVID negative 2/8. Resp PCR +rhinovirus. CXR 2/12 with no overt PNA. Stopped cefdinir 2/15. Cleared for transplant per ID. Repeat RVP negative 3/5. COVID repeated due to sinus congestion, negative. Congestion improved with transition from Claritin to Zyrtec.   Carpel tunnel, bilateral s/p bilateral release. Evidence of thenar atrophy. Relief with steroid  "injection 11/20. Carpal tunnel release 2/18/21. Appreciate Ortho/Plastics consult, signed off. Continue Tylenol 650 mg po QID and Gabapentin 300 mg in AM, 600 mg in the afternoon, and 600 mg in the evening.      FEN: 3 gram sodium diet   PROPHY:  Coumadin  LINES:  PIV   DISPO:  TBD pending cardiac transplant.   CODE STATUS: Full Code   ================================================================  Interval History/ROS: He is feeling better this AM. He notes mild abdominal distention, but denies RESENDIZ. He denies fever, chills, chest pain, palpitations, cough, nausea, vomiting, diarrhea, melena, hematochezia, and LE edema. He is tolerating oral intake well. He notes decreased activity over the past few days.     Last 24 hr care team notes reviewed.   ROS:  4 point ROS including Respiratory, CV, GI and , other than that noted in the HPI, is negative.     Medications: Reviewed in EPIC.     Physical Exam:   BP 97/86 (BP Location: Left arm)   Pulse 68   Temp 97.6  F (36.4  C) (Oral)   Resp 16   Ht 1.727 m (5' 8\")   Wt 102.2 kg (225 lb 6.4 oz)   SpO2 95%   BMI 34.27 kg/m    GENERAL: Appears alert and oriented times three.   HEENT: Eye symmetrical and free of discharge bilaterally. Mucous membranes moist and without lesions.  NECK: Supple and without lymphadenopathy. JVD 10 cm   CV: RRR, S1S2 present with LVAD hum.   RESPIRATORY: Respirations regular, even, and unlabored. Lungs CTA throughout.   GI: Soft and mildly distended with normoactive bowel sounds present in all quadrants. No tenderness, rebound, guarding. No organomegaly.   EXTREMITIES: +1 bilateral LE peripheral edema. 2+ bilateral pedal pulses.   NEUROLOGIC: Alert and orientated x 3. CN II-XII grossly intact. No focal deficits.   MUSCULOSKELETAL: No joint swelling or tenderness.   SKIN: No jaundice. No rashes or lesions. LVAD drive line covered.     Data:  CMP  Recent Labs   Lab 03/29/21  0515 03/28/21  1049 03/28/21  0627 03/27/21  0549 03/26/21  1905 "     --  136 137 135   POTASSIUM 4.4  --  4.1 4.4 4.4   CHLORIDE 104  --  102 105 101   CO2 28  --  31 25 29   ANIONGAP 5  --  3 7 5   GLC 95 57* 82 93 146*   BUN 38*  --  39* 43* 37*   CR 1.56*  --  1.60* 1.56* 1.37*   GFRESTIMATED 46*  --  45* 46* 54*   GFRESTBLACK 54*  --  52* 54* 63   QAMAR 8.5  --  8.6 8.8 8.8   MAG 2.6*  --  2.7* 2.6* 2.6*   PROTTOTAL 6.2*  --   --   --   --    ALBUMIN 3.2*  --   --   --   --    BILITOTAL 0.4  --   --   --   --    ALKPHOS 98  --   --   --   --    AST 32  --   --   --   --    ALT 34  --   --   --   --      CBC  Recent Labs   Lab 03/29/21  0515 03/27/21  0549 03/25/21  0525 03/23/21  1035 03/23/21  0539   WBC 4.1 4.1 4.0  --  4.4   RBC 3.74* 3.89* 3.94*  --  3.74*   HGB 11.2* 11.8* 12.0* 11.9* 11.1*   HCT 36.2* 37.5* 38.0*  --  36.1*   MCV 97 96 96  --  97   MCH 29.9 30.3 30.5  --  29.7   MCHC 30.9* 31.5 31.6  --  30.7*   RDW 16.6* 16.8* 17.0*  --  17.2*    175 200  --  171     INR  Recent Labs   Lab 03/29/21  0515 03/28/21  0627 03/27/21  0549 03/26/21  0623   INR 2.60* 2.45* 2.67* 2.62*       Patient discussed with Dr. Conte.      Soraya Marshall [facility]  3/29/2021

## 2021-03-29 NOTE — PLAN OF CARE
D: Admitted 2/08 for worsening functional status and renal function concerning for worsening heart failure. Now listed status 2E on heart transplant list.   Hx: COPD, CKD3, DM2, NICM (EF 20%), VT/VF s/p CRT-D s/p LVAD HM2 2017 initially as destination therapy d/t obesity, but pt now eligible for transplant due to weight loss.     I: Monitored vitals and assessed pt status.   PRN: Robaxin, Tylenol  Tele: SR  O2: RA, home CPAP at night  Mobility: independent     A: A0x4. VSS. LVAD #'s WNL. No alarms overnight. Flow and power spike up to 7's at times. Denies dizziness. A few 4-7 beat runs of aberrant afib vs SVT overnight. Afebrile. Urinating adequately. LBM 3/28. 3g Na diet. BG checks twice daily and at bedtime. Hypoglycemic episodes in later AM. Endocrine following. C/o bilateral shoulder pain. Right shoulder worse than left. Do not disturb orders respected from [id number]. Able to make needs known.      P: Continue to monitor Pt status and report changes to Cards 2.

## 2021-03-29 NOTE — PROGRESS NOTES
CLINICAL NUTRITION SERVICES - EDUCATION NOTE  - Provider order (nutrition education): Post bariatric hypoglcyemia, recommend treatment option with less carbs around 100g  - See RD note from 3/25 for full assessment.      NEW FINDINGS   The endocrine team saw pt on 3/28. Pt appears to have postbariatric hypoglycemia.  Recommendations from Endocrine: Eat food with more fibres. Avoid meals with high carbs, nutrition consult requested to suggest diet choices that are lower carb (about  grams per day)    [place] reports that he has had instances of hypoglycemia before and is usually able to eat a candy bar to correct it. [place] reports that he usually eats protein and fats w/ his meals. He tends to eat white bread, pasta, rice, etc.     INTERVENTIONS  Implementation  Nutrition Education: Discussed low and high glycemic index foods and how they affect blood sugar. Discussed the importance of having protein, fat, and fiber w/ all meals. Encouraged Jim to choose 15-30g of carbs w/ meals and to increase protein w/ meals.     Monitoring/Evaluation  Will continue to monitor and evaluate per protocol.    Tori Shannon RD, LD  6C RD pager [phone]

## 2021-03-30 ENCOUNTER — DOCUMENTATION ONLY (OUTPATIENT)
Dept: TRANSPLANT | Facility: CLINIC | Age: 66
End: 2021-03-30

## 2021-03-30 ENCOUNTER — APPOINTMENT (OUTPATIENT)
Dept: OCCUPATIONAL THERAPY | Facility: CLINIC | Age: 66
DRG: 001 | End: 2021-03-30
Attending: INTERNAL MEDICINE
Payer: COMMERCIAL

## 2021-03-30 VITALS — BODY MASS INDEX: 34.06 KG/M2 | WEIGHT: 224 LBS

## 2021-03-30 LAB
ANION GAP SERPL CALCULATED.3IONS-SCNC: 7 MMOL/L (ref 3–14)
BUN SERPL-MCNC: 42 MG/DL (ref 7–30)
CALCIUM SERPL-MCNC: 8.8 MG/DL (ref 8.5–10.1)
CHLORIDE SERPL-SCNC: 102 MMOL/L (ref 94–109)
CO2 SERPL-SCNC: 27 MMOL/L (ref 20–32)
CREAT SERPL-MCNC: 1.71 MG/DL (ref 0.66–1.25)
GFR SERPL CREATININE-BSD FRML MDRD: 41 ML/MIN/{1.73_M2}
GLUCOSE BLDC GLUCOMTR-MCNC: 110 MG/DL (ref 70–99)
GLUCOSE BLDC GLUCOMTR-MCNC: 78 MG/DL (ref 70–99)
GLUCOSE BLDC GLUCOMTR-MCNC: 90 MG/DL (ref 70–99)
GLUCOSE SERPL-MCNC: 88 MG/DL (ref 70–99)
INR PPP: 2.59 (ref 0.86–1.14)
MAGNESIUM SERPL-MCNC: 2.4 MG/DL (ref 1.6–2.3)
POTASSIUM SERPL-SCNC: 4.2 MMOL/L (ref 3.4–5.3)
SODIUM SERPL-SCNC: 136 MMOL/L (ref 133–144)

## 2021-03-30 PROCEDURE — 999N001017 HC STATISTIC GLUCOSE BY METER IP

## 2021-03-30 PROCEDURE — 250N000013 HC RX MED GY IP 250 OP 250 PS 637: Performed by: NURSE PRACTITIONER

## 2021-03-30 PROCEDURE — 214N000001 HC R&B CCU UMMC

## 2021-03-30 PROCEDURE — 93750 INTERROGATION VAD IN PERSON: CPT | Performed by: NURSE PRACTITIONER

## 2021-03-30 PROCEDURE — 250N000013 HC RX MED GY IP 250 OP 250 PS 637: Performed by: PHYSICIAN ASSISTANT

## 2021-03-30 PROCEDURE — 250N000013 HC RX MED GY IP 250 OP 250 PS 637: Performed by: STUDENT IN AN ORGANIZED HEALTH CARE EDUCATION/TRAINING PROGRAM

## 2021-03-30 PROCEDURE — 250N000013 HC RX MED GY IP 250 OP 250 PS 637: Performed by: INTERNAL MEDICINE

## 2021-03-30 PROCEDURE — 36415 COLL VENOUS BLD VENIPUNCTURE: CPT | Performed by: STUDENT IN AN ORGANIZED HEALTH CARE EDUCATION/TRAINING PROGRAM

## 2021-03-30 PROCEDURE — 97110 THERAPEUTIC EXERCISES: CPT | Mod: GO

## 2021-03-30 PROCEDURE — 97530 THERAPEUTIC ACTIVITIES: CPT | Mod: GO

## 2021-03-30 PROCEDURE — 83735 ASSAY OF MAGNESIUM: CPT | Performed by: STUDENT IN AN ORGANIZED HEALTH CARE EDUCATION/TRAINING PROGRAM

## 2021-03-30 PROCEDURE — 250N000009 HC RX 250: Performed by: NURSE PRACTITIONER

## 2021-03-30 PROCEDURE — 85610 PROTHROMBIN TIME: CPT | Performed by: STUDENT IN AN ORGANIZED HEALTH CARE EDUCATION/TRAINING PROGRAM

## 2021-03-30 PROCEDURE — 99232 SBSQ HOSP IP/OBS MODERATE 35: CPT | Mod: 24 | Performed by: NURSE PRACTITIONER

## 2021-03-30 PROCEDURE — 80048 BASIC METABOLIC PNL TOTAL CA: CPT | Performed by: STUDENT IN AN ORGANIZED HEALTH CARE EDUCATION/TRAINING PROGRAM

## 2021-03-30 RX ORDER — WARFARIN SODIUM 4 MG/1
8 TABLET ORAL
Status: COMPLETED | OUTPATIENT
Start: 2021-03-30 | End: 2021-03-30

## 2021-03-30 RX ORDER — LIDOCAINE 4 G/G
2 PATCH TOPICAL
Status: DISCONTINUED | OUTPATIENT
Start: 2021-03-30 | End: 2021-05-31 | Stop reason: HOSPADM

## 2021-03-30 RX ORDER — BUMETANIDE 2 MG/1
4 TABLET ORAL
Status: DISCONTINUED | OUTPATIENT
Start: 2021-03-30 | End: 2021-04-17

## 2021-03-30 RX ADMIN — DICLOFENAC SODIUM 2 G: 10 GEL TOPICAL at 12:09

## 2021-03-30 RX ADMIN — GABAPENTIN 600 MG: 300 CAPSULE ORAL at 22:08

## 2021-03-30 RX ADMIN — HYDRALAZINE HYDROCHLORIDE 50 MG: 50 TABLET ORAL at 08:53

## 2021-03-30 RX ADMIN — DICLOFENAC SODIUM 2 G: 10 GEL TOPICAL at 18:13

## 2021-03-30 RX ADMIN — ACETAMINOPHEN 650 MG: 325 TABLET, FILM COATED ORAL at 14:16

## 2021-03-30 RX ADMIN — DOCUSATE SODIUM 50 MG AND SENNOSIDES 8.6 MG 1 TABLET: 8.6; 5 TABLET, FILM COATED ORAL at 20:00

## 2021-03-30 RX ADMIN — HYDRALAZINE HYDROCHLORIDE 75 MG: 50 TABLET ORAL at 14:09

## 2021-03-30 RX ADMIN — AMIODARONE HYDROCHLORIDE 200 MG: 200 TABLET ORAL at 08:53

## 2021-03-30 RX ADMIN — ASPIRIN 81 MG CHEWABLE TABLET 81 MG: 81 TABLET CHEWABLE at 08:53

## 2021-03-30 RX ADMIN — Medication 10 MG: at 22:10

## 2021-03-30 RX ADMIN — ALLOPURINOL 300 MG: 300 TABLET ORAL at 08:53

## 2021-03-30 RX ADMIN — DICLOFENAC SODIUM 2 G: 10 GEL TOPICAL at 20:00

## 2021-03-30 RX ADMIN — ANAKINRA 100 MG: 100 INJECTION, SOLUTION SUBCUTANEOUS at 09:01

## 2021-03-30 RX ADMIN — FLUTICASONE FUROATE AND VILANTEROL TRIFENATATE 1 PUFF: 100; 25 POWDER RESPIRATORY (INHALATION) at 08:55

## 2021-03-30 RX ADMIN — ISOSORBIDE DINITRATE 10 MG: 10 TABLET ORAL at 08:54

## 2021-03-30 RX ADMIN — BUMETANIDE 4 MG: 2 TABLET ORAL at 08:54

## 2021-03-30 RX ADMIN — BUPROPION HYDROCHLORIDE 100 MG: 100 TABLET, EXTENDED RELEASE ORAL at 08:53

## 2021-03-30 RX ADMIN — GABAPENTIN 300 MG: 300 CAPSULE ORAL at 08:54

## 2021-03-30 RX ADMIN — POTASSIUM CHLORIDE 40 MEQ: 750 TABLET, EXTENDED RELEASE ORAL at 08:52

## 2021-03-30 RX ADMIN — CETIRIZINE HYDROCHLORIDE 10 MG: 10 TABLET, FILM COATED ORAL at 08:53

## 2021-03-30 RX ADMIN — POTASSIUM CHLORIDE 40 MEQ: 750 TABLET, EXTENDED RELEASE ORAL at 20:00

## 2021-03-30 RX ADMIN — METHOCARBAMOL 500 MG: 500 TABLET, FILM COATED ORAL at 20:05

## 2021-03-30 RX ADMIN — DICLOFENAC SODIUM 2 G: 10 GEL TOPICAL at 08:56

## 2021-03-30 RX ADMIN — GABAPENTIN 600 MG: 300 CAPSULE ORAL at 14:09

## 2021-03-30 RX ADMIN — ACETAMINOPHEN 650 MG: 325 TABLET, FILM COATED ORAL at 20:05

## 2021-03-30 RX ADMIN — HYDRALAZINE HYDROCHLORIDE 75 MG: 50 TABLET ORAL at 20:00

## 2021-03-30 RX ADMIN — ISOSORBIDE DINITRATE 10 MG: 10 TABLET ORAL at 20:00

## 2021-03-30 RX ADMIN — BUPROPION HYDROCHLORIDE 100 MG: 100 TABLET, EXTENDED RELEASE ORAL at 20:00

## 2021-03-30 RX ADMIN — UMECLIDINIUM 1 PUFF: 62.5 AEROSOL, POWDER ORAL at 08:54

## 2021-03-30 RX ADMIN — BUMETANIDE 4 MG: 2 TABLET ORAL at 14:09

## 2021-03-30 RX ADMIN — ISOSORBIDE DINITRATE 10 MG: 10 TABLET ORAL at 14:09

## 2021-03-30 RX ADMIN — Medication: at 06:27

## 2021-03-30 RX ADMIN — MONTELUKAST 10 MG: 10 TABLET, FILM COATED ORAL at 22:08

## 2021-03-30 RX ADMIN — ACETAMINOPHEN 650 MG: 325 TABLET, FILM COATED ORAL at 06:27

## 2021-03-30 RX ADMIN — WARFARIN SODIUM 8 MG: 4 TABLET ORAL at 18:13

## 2021-03-30 ASSESSMENT — ACTIVITIES OF DAILY LIVING (ADL)
ADLS_ACUITY_SCORE: 10

## 2021-03-30 ASSESSMENT — MIFFLIN-ST. JEOR: SCORE: 1786.92

## 2021-03-30 NOTE — PLAN OF CARE
D: Admitted 2/08 for worsening functional status and renal function concerning for worsening heart failure. Now listed status 2E on heart transplant list.   Hx: COPD, CKD3, DM2, NICM (EF 20%), VT/VF s/p CRT-D s/p LVAD HM2 2017 initially as destination therapy d/t obesity, but pt now eligible for transplant due to weight loss.     I: Monitored vitals and assessed pt status.   PRN: Robaxin x1, Tylenol x1, [place], melatonin at HS     A: A0x4. VSS on RA/home CPAP at night. Tele shows SR, rate 60-70s. LVAD #'s WNL, no alarms overnight, dressing CDI. Denies dizziness. Afebrile. Urinating adequately. BG checks twice daily and at bedtime. C/o bilateral shoulder pain, well controlled by current regimen. Up independently. Do not disturb orders respected from [id number]. Able to make needs known.      P: Continue to monitor Pt status and report changes to Cards 2.      [id number]  Rajani Holman RN on 3/30/2021 at 6:09 AM

## 2021-03-30 NOTE — PROGRESS NOTES
"SPIRITUAL HEALTH SERVICES  SPIRITUAL ASSESSMENT Progress Note  [facility] ([place] 6C     REFERRAL SOURCE: pt had been seen previously by  during this admission. Yesterday and today were my first visits with pt as 6C unit .     Pt very engaging, pleasant, said he would appreciate \"having you come by whenever you want. Prayer is much appreciated...\" Pt's Mormonism willa central to his coping with adversity, said he is active \"in a non-Yazidi house Zoroastrian. I have lots of people praying for me.\" Pt noted his strong family support. Pt is now on transplant list, said \"I'm here for the long haul, until I get a transplant...\" Prayer was welcomed.    PLAN: continue to follow, visiting at least weekly while pt on unit.    Parviz Early) gJ Smith M.Div., [facility]  Staff   Pager [phone]      * [facility] remains available 24/7 for emergent requests/referrals, either by having the switchboard page the on-call  or by entering an ASAP/STAT consult in Epic (this will also page the on-call ). Routine Epic consults receive an initial response within 24 hours.*      "

## 2021-03-30 NOTE — PROGRESS NOTES
[facility]   Cardiology II Service / Advanced Heart Failure  Daily Progress Note  Date of Service: 3/30/2021      Patient: Jim Willingham  MRN: [id number]  Admission Date: 2/8/2021  Hospital Day # 50    Assessment and Plan: Jim Willingham is a 65 year old male with history of NICM EF 20% c/b VT s/p CRT-D s/p HMII LVAD 6/19/2017 originally intended as destination therapy 2/2 obesity however with recent weight loss now candidate for transplantation. He is admitted for worsening functional status and renal function concerning for worsening heart failure. He is now listed status 2E for transplant.    Changes today:   - Bumex 4 mg po BID. If weight trends up in AM consider IV Bumex vs IV Diuril.   - Lidocaine patches resumed for back pain.      Chronic systolic heart failure secondary to NICM s/p HM II LVAD. Echo 1/8/21 at 9400rpm, EF<30%m LVIDd 6.8cm, at least mildly reduced RV function, AoV closed without AI, mild-mod eccentric MR, dilated IVC without collapse. RHC 3/23 RA 10 mPA 25 mPCWP 14 Kee CO/CI 6.5/3.1.  Stage D, NYHA Class III  ACEi/ARB contraindicated due to renal dysfunction. Isordil 10 mg po TID. Hydralazine increased to 75 mg po TID.   BB Contraindicated due to low cardiac output.   Aldosterone antagonist contraindicated due to renal dysfunction  SCD prophylaxis CRT-D  Fluid status: Mild hypervolemia. Bumex 4 mg po BID  MAP: 80-98  LDH: 298 3/26  Anticoagulation: Coumadin per pharmacy. INR- 2.59, goal 2.5-3, goal increased in setting of power spikes and elevated flows this hospitalization.   Antiplatelet: ASA 81 mg po daily  - remains on the cardiac transplant list status 2E. Persistent escalation of diuretics in setting of progressive heart failure with refractory hypervolemia.   - Encouraged activity.      The patient's HeartMate LVAD was interrogated 3/30/2021  * Speed 9600 rpm   * Pulsatility index 5.3  * Power 6.7 Manuel   * Flow 6.3 L/minute   Fluid status: mild hypervolemia.   Alarms  were reviewed, and notable for PI events.   All external components were inspected and showed no evidence of damage or malfunction.     WALTER on CKD Stage III. Cr peaked at 2.22.  - Cr- 1.71 (1.56).     History of Afib. History of VT/VF. Asymptomatic bursts of questionable AF vs NSVT without hemodynamic compromise. RHC 3/23 RA 10 mPA 25 mPCWP 14 Kee CO/CI 6.5/3.1. CHADSVASC-5.   - Continue Amiodarone 200 mg po daily.   - Coumadin as above.   - Device interrogation given increased frequency in arrhythmia, no arrhythmias noted.      DM type II, controlled. Symptomatic Hypoglycemia, resolved. Low Cortisol: Hgb A1C-6.2. Lantus and Novololg sliding scale insulin discontinued. Endo consult appreciated. C-peptide 13.9 an Proinsulin 18.4. Serum cortisol 7.7 3/6/21, cosyntropin stim test WNL per Endo.   -Per endo --> Fingerstick glucose might not be accurate for hypoglycemia, if patient has glucose <55 (without insulin), plasma glucose should be sent for confirmation of hypoglycemia prior to correction. While the patient is off insulin therapy, POC glucose readings above 60 are acceptable for the patient  - Serum glucose confirmation of hypoglycemia of 57 3/29/21  pt symptomatic. Endo notified and recommended dietary modifications.      Chronic/resolved conditions  COPD/Asthma: pta Breo Ellipta, albuterol prn.   Depression: pta Bupropion  Right sided weakness, resolved. CT head negative for acute findings. Appreciate Neuro eval.   Staph Hominis Bacteremia. No need for surveillance blood cx per transplant ID.    Rhinovirus, resolved. Bacterial bronchitis. Completed 5 day course of Cefdinir. COVID negative 2/8. Resp PCR +rhinovirus. CXR 2/12 with no overt PNA. Stopped cefdinir 2/15. Cleared for transplant per ID. Repeat RVP negative 3/5. COVID repeated due to sinus congestion, negative. Congestion improved with transition from Claritin to Zyrtec.   Carpel tunnel, bilateral s/p bilateral release. Evidence of thenar atrophy.  "Relief with steroid injection 11/20. Carpal tunnel release 2/18/21. Appreciate Ortho/Plastics consult, signed off. Continue Tylenol 650 mg po QID and Gabapentin 300 mg in AM, 600 mg in the afternoon, and 600 mg in the evening.      FEN: 3 gram sodium diet   PROPHY:  Coumadin  LINES:  PIV   DISPO:  TBD pending cardiac transplant.   CODE STATUS: Full Code   ================================================================    Interval History/ROS: He notes mild abdominal distention. He denies fever, chills, chest pain, palpitations, RESENDIZ, cough, nausea, vomiting, diarrhea, melena, hematochezia, and LE edema. He is tolerating oral intake and ambulation.     Last 24 hr care team notes reviewed.   ROS:  4 point ROS including Respiratory, CV, GI and , other than that noted in the HPI, is negative.     Medications: Reviewed in EPIC.     Physical Exam:   /84 (BP Location: Left arm)   Pulse 68   Temp 97.7  F (36.5  C) (Oral)   Resp 16   Ht 1.727 m (5' 8\")   Wt 101.7 kg (224 lb 4.8 oz)   SpO2 98%   BMI 34.10 kg/m    GENERAL: Appears alert and oriented times three.   HEENT: Eye symmetrical and free of discharge bilaterally. Mucous membranes moist and without lesions.  NECK: Supple and without lymphadenopathy. JVD lower 1/3 of neck upright.   CV: RRR, S1S2 present with LVAD hum.   RESPIRATORY: Respirations regular, even, and unlabored. Lungs CTA throughout.   GI: Soft and non distended with normoactive bowel sounds present in all quadrants. No tenderness, rebound, guarding. No organomegaly.   EXTREMITIES: Trace bilateral LE peripheral edema. 2+ bilateral pedal pulses.   NEUROLOGIC: Alert and orientated x 3. CN II-XII grossly intact. No focal deficits.   MUSCULOSKELETAL: No joint swelling or tenderness.   SKIN: No jaundice. No rashes or lesions. LVAD drive line covered.     Data:  CMP  Recent Labs   Lab 03/30/21  0627 03/29/21  0515 03/28/21  1049 03/28/21  0627 03/27/21  0549    137  --  136 137   POTASSIUM " 4.2 4.4  --  4.1 4.4   CHLORIDE 102 104  --  102 105   CO2 27 28  --  31 25   ANIONGAP 7 5  --  3 7   GLC 88 95 57* 82 93   BUN 42* 38*  --  39* 43*   CR 1.71* 1.56*  --  1.60* 1.56*   GFRESTIMATED 41* 46*  --  45* 46*   GFRESTBLACK 48* 54*  --  52* 54*   QAMAR 8.8 8.5  --  8.6 8.8   MAG 2.4* 2.6*  --  2.7* 2.6*   PROTTOTAL  --  6.2*  --   --   --    ALBUMIN  --  3.2*  --   --   --    BILITOTAL  --  0.4  --   --   --    ALKPHOS  --  98  --   --   --    AST  --  32  --   --   --    ALT  --  34  --   --   --      CBC  Recent Labs   Lab 03/29/21 0515 03/27/21  0549 03/25/21  0525 03/23/21  1035   WBC 4.1 4.1 4.0  --    RBC 3.74* 3.89* 3.94*  --    HGB 11.2* 11.8* 12.0* 11.9*   HCT 36.2* 37.5* 38.0*  --    MCV 97 96 96  --    MCH 29.9 30.3 30.5  --    MCHC 30.9* 31.5 31.6  --    RDW 16.6* 16.8* 17.0*  --     175 200  --      INR  Recent Labs   Lab 03/30/21 0627 03/29/21 0515 03/28/21 0627 03/27/21  0549   INR 2.59* 2.60* 2.45* 2.67*       Patient discussed with Dr. Conte.      Soraya Marshall [facility]  3/30/2021

## 2021-03-30 NOTE — SUMMARY OF CARE
-Pt up ad-francisco with safe judgement.  -Pt had his drive line dressing changed per protocol.  -Pt working with therapies.  -Pt morale presents as possative as exemplified by his optimistic socialization.

## 2021-03-31 ENCOUNTER — APPOINTMENT (OUTPATIENT)
Dept: PHYSICAL THERAPY | Facility: CLINIC | Age: 66
DRG: 001 | End: 2021-03-31
Attending: INTERNAL MEDICINE
Payer: COMMERCIAL

## 2021-03-31 LAB
ANION GAP SERPL CALCULATED.3IONS-SCNC: 6 MMOL/L (ref 3–14)
BUN SERPL-MCNC: 45 MG/DL (ref 7–30)
CALCIUM SERPL-MCNC: 8.7 MG/DL (ref 8.5–10.1)
CHLORIDE SERPL-SCNC: 103 MMOL/L (ref 94–109)
CO2 SERPL-SCNC: 27 MMOL/L (ref 20–32)
CREAT SERPL-MCNC: 1.66 MG/DL (ref 0.66–1.25)
ERYTHROCYTE [DISTWIDTH] IN BLOOD BY AUTOMATED COUNT: 16.3 % (ref 10–15)
GFR SERPL CREATININE-BSD FRML MDRD: 43 ML/MIN/{1.73_M2}
GLUCOSE BLDC GLUCOMTR-MCNC: 106 MG/DL (ref 70–99)
GLUCOSE BLDC GLUCOMTR-MCNC: 120 MG/DL (ref 70–99)
GLUCOSE SERPL-MCNC: 96 MG/DL (ref 70–99)
HCT VFR BLD AUTO: 37.9 % (ref 40–53)
HGB BLD-MCNC: 12.1 G/DL (ref 13.3–17.7)
INR PPP: 2.46 (ref 0.86–1.14)
MAGNESIUM SERPL-MCNC: 2.6 MG/DL (ref 1.6–2.3)
MCH RBC QN AUTO: 29.8 PG (ref 26.5–33)
MCHC RBC AUTO-ENTMCNC: 31.9 G/DL (ref 31.5–36.5)
MCV RBC AUTO: 93 FL (ref 78–100)
MDC_IDC_LEAD_IMPLANT_DT: NORMAL
MDC_IDC_LEAD_LOCATION: NORMAL
MDC_IDC_LEAD_LOCATION_DETAIL_1: NORMAL
MDC_IDC_LEAD_MFG: NORMAL
MDC_IDC_LEAD_MODEL: NORMAL
MDC_IDC_LEAD_POLARITY_TYPE: NORMAL
MDC_IDC_LEAD_SERIAL: NORMAL
MDC_IDC_MSMT_BATTERY_DTM: NORMAL
MDC_IDC_MSMT_BATTERY_REMAINING_LONGEVITY: 7 MO
MDC_IDC_MSMT_BATTERY_RRT_TRIGGER: 2.73
MDC_IDC_MSMT_BATTERY_STATUS: NORMAL
MDC_IDC_MSMT_BATTERY_VOLTAGE: 2.82 V
MDC_IDC_MSMT_CAP_CHARGE_DTM: NORMAL
MDC_IDC_MSMT_CAP_CHARGE_ENERGY: 18 J
MDC_IDC_MSMT_CAP_CHARGE_TIME: 4.83
MDC_IDC_MSMT_CAP_CHARGE_TYPE: NORMAL
MDC_IDC_MSMT_LEADCHNL_LV_IMPEDANCE_VALUE: 342 OHM
MDC_IDC_MSMT_LEADCHNL_LV_IMPEDANCE_VALUE: 4047 OHM
MDC_IDC_MSMT_LEADCHNL_LV_IMPEDANCE_VALUE: 4047 OHM
MDC_IDC_MSMT_LEADCHNL_LV_PACING_THRESHOLD_AMPLITUDE: 3 V
MDC_IDC_MSMT_LEADCHNL_LV_PACING_THRESHOLD_PULSEWIDTH: 0.4 MS
MDC_IDC_MSMT_LEADCHNL_RA_IMPEDANCE_VALUE: 513 OHM
MDC_IDC_MSMT_LEADCHNL_RA_PACING_THRESHOLD_AMPLITUDE: 1.5 V
MDC_IDC_MSMT_LEADCHNL_RA_PACING_THRESHOLD_PULSEWIDTH: 0.4 MS
MDC_IDC_MSMT_LEADCHNL_RA_SENSING_INTR_AMPL: 1.12 MV
MDC_IDC_MSMT_LEADCHNL_RA_SENSING_INTR_AMPL: 1.25 MV
MDC_IDC_MSMT_LEADCHNL_RV_IMPEDANCE_VALUE: 266 OHM
MDC_IDC_MSMT_LEADCHNL_RV_IMPEDANCE_VALUE: 399 OHM
MDC_IDC_MSMT_LEADCHNL_RV_PACING_THRESHOLD_AMPLITUDE: 1.25 V
MDC_IDC_MSMT_LEADCHNL_RV_PACING_THRESHOLD_PULSEWIDTH: 0.4 MS
MDC_IDC_MSMT_LEADCHNL_RV_SENSING_INTR_AMPL: 5.38 MV
MDC_IDC_MSMT_LEADCHNL_RV_SENSING_INTR_AMPL: 5.5 MV
MDC_IDC_PG_IMPLANT_DTM: NORMAL
MDC_IDC_PG_MFG: NORMAL
MDC_IDC_PG_MODEL: NORMAL
MDC_IDC_PG_SERIAL: NORMAL
MDC_IDC_PG_TYPE: NORMAL
MDC_IDC_SESS_CLINIC_NAME: NORMAL
MDC_IDC_SESS_DTM: NORMAL
MDC_IDC_SESS_TYPE: NORMAL
MDC_IDC_SET_BRADY_LOWRATE: 50 {BEATS}/MIN
MDC_IDC_SET_BRADY_MAX_SENSOR_RATE: 130 {BEATS}/MIN
MDC_IDC_SET_BRADY_MODE: NORMAL
MDC_IDC_SET_CRT_PACED_CHAMBERS: NORMAL
MDC_IDC_SET_LEADCHNL_LV_PACING_ANODE_ELECTRODE_1: NORMAL
MDC_IDC_SET_LEADCHNL_LV_PACING_ANODE_LOCATION_1: NORMAL
MDC_IDC_SET_LEADCHNL_LV_PACING_CATHODE_ELECTRODE_1: NORMAL
MDC_IDC_SET_LEADCHNL_LV_PACING_CATHODE_LOCATION_1: NORMAL
MDC_IDC_SET_LEADCHNL_LV_PACING_POLARITY: NORMAL
MDC_IDC_SET_LEADCHNL_RA_PACING_AMPLITUDE: 3 V
MDC_IDC_SET_LEADCHNL_RA_PACING_ANODE_ELECTRODE_1: NORMAL
MDC_IDC_SET_LEADCHNL_RA_PACING_ANODE_LOCATION_1: NORMAL
MDC_IDC_SET_LEADCHNL_RA_PACING_CATHODE_ELECTRODE_1: NORMAL
MDC_IDC_SET_LEADCHNL_RA_PACING_CATHODE_LOCATION_1: NORMAL
MDC_IDC_SET_LEADCHNL_RA_PACING_POLARITY: NORMAL
MDC_IDC_SET_LEADCHNL_RA_PACING_PULSEWIDTH: 0.4 MS
MDC_IDC_SET_LEADCHNL_RA_SENSING_ANODE_ELECTRODE_1: NORMAL
MDC_IDC_SET_LEADCHNL_RA_SENSING_ANODE_LOCATION_1: NORMAL
MDC_IDC_SET_LEADCHNL_RA_SENSING_CATHODE_ELECTRODE_1: NORMAL
MDC_IDC_SET_LEADCHNL_RA_SENSING_CATHODE_LOCATION_1: NORMAL
MDC_IDC_SET_LEADCHNL_RA_SENSING_POLARITY: NORMAL
MDC_IDC_SET_LEADCHNL_RA_SENSING_SENSITIVITY: 0.45 MV
MDC_IDC_SET_LEADCHNL_RV_PACING_AMPLITUDE: 2.5 V
MDC_IDC_SET_LEADCHNL_RV_PACING_ANODE_ELECTRODE_1: NORMAL
MDC_IDC_SET_LEADCHNL_RV_PACING_ANODE_LOCATION_1: NORMAL
MDC_IDC_SET_LEADCHNL_RV_PACING_CATHODE_ELECTRODE_1: NORMAL
MDC_IDC_SET_LEADCHNL_RV_PACING_CATHODE_LOCATION_1: NORMAL
MDC_IDC_SET_LEADCHNL_RV_PACING_POLARITY: NORMAL
MDC_IDC_SET_LEADCHNL_RV_PACING_PULSEWIDTH: 0.4 MS
MDC_IDC_SET_LEADCHNL_RV_SENSING_ANODE_ELECTRODE_1: NORMAL
MDC_IDC_SET_LEADCHNL_RV_SENSING_ANODE_LOCATION_1: NORMAL
MDC_IDC_SET_LEADCHNL_RV_SENSING_CATHODE_ELECTRODE_1: NORMAL
MDC_IDC_SET_LEADCHNL_RV_SENSING_CATHODE_LOCATION_1: NORMAL
MDC_IDC_SET_LEADCHNL_RV_SENSING_POLARITY: NORMAL
MDC_IDC_SET_LEADCHNL_RV_SENSING_SENSITIVITY: 0.3 MV
MDC_IDC_SET_ZONE_DETECTION_BEATS_DENOMINATOR: 40 {BEATS}
MDC_IDC_SET_ZONE_DETECTION_BEATS_NUMERATOR: 30 {BEATS}
MDC_IDC_SET_ZONE_DETECTION_INTERVAL: 240 MS
MDC_IDC_SET_ZONE_DETECTION_INTERVAL: 270 MS
MDC_IDC_SET_ZONE_DETECTION_INTERVAL: 360 MS
MDC_IDC_SET_ZONE_DETECTION_INTERVAL: 400 MS
MDC_IDC_SET_ZONE_DETECTION_INTERVAL: 430 MS
MDC_IDC_SET_ZONE_TYPE: NORMAL
MDC_IDC_STAT_AT_BURDEN_PERCENT: 0 %
MDC_IDC_STAT_AT_DTM_END: NORMAL
MDC_IDC_STAT_AT_DTM_START: NORMAL
MDC_IDC_STAT_BRADY_AP_VP_PERCENT: 0 %
MDC_IDC_STAT_BRADY_AP_VS_PERCENT: 0.03 %
MDC_IDC_STAT_BRADY_AS_VP_PERCENT: 0 %
MDC_IDC_STAT_BRADY_AS_VS_PERCENT: 99.97 %
MDC_IDC_STAT_BRADY_DTM_END: NORMAL
MDC_IDC_STAT_BRADY_DTM_START: NORMAL
MDC_IDC_STAT_BRADY_RA_PERCENT_PACED: 0.05 %
MDC_IDC_STAT_BRADY_RV_PERCENT_PACED: 0 %
MDC_IDC_STAT_CRT_DTM_END: NORMAL
MDC_IDC_STAT_CRT_DTM_START: NORMAL
MDC_IDC_STAT_CRT_LV_PERCENT_PACED: 0 %
MDC_IDC_STAT_CRT_PERCENT_PACED: 0 %
MDC_IDC_STAT_EPISODE_RECENT_COUNT: 0
MDC_IDC_STAT_EPISODE_RECENT_COUNT_DTM_END: NORMAL
MDC_IDC_STAT_EPISODE_RECENT_COUNT_DTM_START: NORMAL
MDC_IDC_STAT_EPISODE_TOTAL_COUNT: 0
MDC_IDC_STAT_EPISODE_TOTAL_COUNT: 0
MDC_IDC_STAT_EPISODE_TOTAL_COUNT: 189
MDC_IDC_STAT_EPISODE_TOTAL_COUNT: 2
MDC_IDC_STAT_EPISODE_TOTAL_COUNT: 20
MDC_IDC_STAT_EPISODE_TOTAL_COUNT: 22
MDC_IDC_STAT_EPISODE_TOTAL_COUNT: 292
MDC_IDC_STAT_EPISODE_TOTAL_COUNT_DTM_END: NORMAL
MDC_IDC_STAT_EPISODE_TOTAL_COUNT_DTM_START: NORMAL
MDC_IDC_STAT_EPISODE_TYPE: NORMAL
MDC_IDC_STAT_TACHYTHERAPY_ATP_DELIVERED_RECENT: 0
MDC_IDC_STAT_TACHYTHERAPY_ATP_DELIVERED_TOTAL: 14
MDC_IDC_STAT_TACHYTHERAPY_RECENT_DTM_END: NORMAL
MDC_IDC_STAT_TACHYTHERAPY_RECENT_DTM_START: NORMAL
MDC_IDC_STAT_TACHYTHERAPY_SHOCKS_ABORTED_RECENT: 0
MDC_IDC_STAT_TACHYTHERAPY_SHOCKS_ABORTED_TOTAL: 3
MDC_IDC_STAT_TACHYTHERAPY_SHOCKS_DELIVERED_RECENT: 0
MDC_IDC_STAT_TACHYTHERAPY_SHOCKS_DELIVERED_TOTAL: 5
MDC_IDC_STAT_TACHYTHERAPY_TOTAL_DTM_END: NORMAL
MDC_IDC_STAT_TACHYTHERAPY_TOTAL_DTM_START: NORMAL
PLATELET # BLD AUTO: 174 10E9/L (ref 150–450)
POTASSIUM SERPL-SCNC: 4.2 MMOL/L (ref 3.4–5.3)
RBC # BLD AUTO: 4.06 10E12/L (ref 4.4–5.9)
SODIUM SERPL-SCNC: 137 MMOL/L (ref 133–144)
WBC # BLD AUTO: 3.8 10E9/L (ref 4–11)

## 2021-03-31 PROCEDURE — 85027 COMPLETE CBC AUTOMATED: CPT | Performed by: STUDENT IN AN ORGANIZED HEALTH CARE EDUCATION/TRAINING PROGRAM

## 2021-03-31 PROCEDURE — 250N000013 HC RX MED GY IP 250 OP 250 PS 637: Performed by: NURSE PRACTITIONER

## 2021-03-31 PROCEDURE — 36415 COLL VENOUS BLD VENIPUNCTURE: CPT | Performed by: STUDENT IN AN ORGANIZED HEALTH CARE EDUCATION/TRAINING PROGRAM

## 2021-03-31 PROCEDURE — 85610 PROTHROMBIN TIME: CPT | Performed by: STUDENT IN AN ORGANIZED HEALTH CARE EDUCATION/TRAINING PROGRAM

## 2021-03-31 PROCEDURE — 214N000001 HC R&B CCU UMMC

## 2021-03-31 PROCEDURE — 250N000013 HC RX MED GY IP 250 OP 250 PS 637: Performed by: STUDENT IN AN ORGANIZED HEALTH CARE EDUCATION/TRAINING PROGRAM

## 2021-03-31 PROCEDURE — 80048 BASIC METABOLIC PNL TOTAL CA: CPT | Performed by: STUDENT IN AN ORGANIZED HEALTH CARE EDUCATION/TRAINING PROGRAM

## 2021-03-31 PROCEDURE — 250N000009 HC RX 250: Performed by: NURSE PRACTITIONER

## 2021-03-31 PROCEDURE — 250N000013 HC RX MED GY IP 250 OP 250 PS 637: Performed by: PHYSICIAN ASSISTANT

## 2021-03-31 PROCEDURE — 90832 PSYTX W PT 30 MINUTES: CPT | Performed by: PSYCHOLOGIST

## 2021-03-31 PROCEDURE — 250N000013 HC RX MED GY IP 250 OP 250 PS 637: Performed by: INTERNAL MEDICINE

## 2021-03-31 PROCEDURE — 93750 INTERROGATION VAD IN PERSON: CPT | Performed by: PHYSICIAN ASSISTANT

## 2021-03-31 PROCEDURE — 99233 SBSQ HOSP IP/OBS HIGH 50: CPT | Mod: 24 | Performed by: PHYSICIAN ASSISTANT

## 2021-03-31 PROCEDURE — 999N001017 HC STATISTIC GLUCOSE BY METER IP

## 2021-03-31 PROCEDURE — 83735 ASSAY OF MAGNESIUM: CPT | Performed by: STUDENT IN AN ORGANIZED HEALTH CARE EDUCATION/TRAINING PROGRAM

## 2021-03-31 RX ORDER — WARFARIN SODIUM 4 MG/1
8 TABLET ORAL
Status: COMPLETED | OUTPATIENT
Start: 2021-03-31 | End: 2021-03-31

## 2021-03-31 RX ADMIN — LIDOCAINE 2 PATCH: 560 PATCH PERCUTANEOUS; TOPICAL; TRANSDERMAL at 06:33

## 2021-03-31 RX ADMIN — HYDRALAZINE HYDROCHLORIDE 75 MG: 50 TABLET ORAL at 13:20

## 2021-03-31 RX ADMIN — HYDRALAZINE HYDROCHLORIDE 75 MG: 50 TABLET ORAL at 09:14

## 2021-03-31 RX ADMIN — WARFARIN SODIUM 8 MG: 4 TABLET ORAL at 17:31

## 2021-03-31 RX ADMIN — BUMETANIDE 4 MG: 2 TABLET ORAL at 13:20

## 2021-03-31 RX ADMIN — ACETAMINOPHEN 650 MG: 325 TABLET, FILM COATED ORAL at 06:38

## 2021-03-31 RX ADMIN — MONTELUKAST 10 MG: 10 TABLET, FILM COATED ORAL at 22:48

## 2021-03-31 RX ADMIN — BUPROPION HYDROCHLORIDE 100 MG: 100 TABLET, EXTENDED RELEASE ORAL at 19:35

## 2021-03-31 RX ADMIN — ACETAMINOPHEN 650 MG: 325 TABLET, FILM COATED ORAL at 22:48

## 2021-03-31 RX ADMIN — ALLOPURINOL 300 MG: 300 TABLET ORAL at 09:13

## 2021-03-31 RX ADMIN — ISOSORBIDE DINITRATE 10 MG: 10 TABLET ORAL at 13:21

## 2021-03-31 RX ADMIN — AMIODARONE HYDROCHLORIDE 200 MG: 200 TABLET ORAL at 09:15

## 2021-03-31 RX ADMIN — CETIRIZINE HYDROCHLORIDE 10 MG: 10 TABLET, FILM COATED ORAL at 09:15

## 2021-03-31 RX ADMIN — GABAPENTIN 600 MG: 300 CAPSULE ORAL at 13:20

## 2021-03-31 RX ADMIN — LIDOCAINE 2 PATCH: 560 PATCH PERCUTANEOUS; TOPICAL; TRANSDERMAL at 09:19

## 2021-03-31 RX ADMIN — ANAKINRA 100 MG: 100 INJECTION, SOLUTION SUBCUTANEOUS at 09:22

## 2021-03-31 RX ADMIN — ISOSORBIDE DINITRATE 10 MG: 10 TABLET ORAL at 19:35

## 2021-03-31 RX ADMIN — Medication 10 MG: at 22:48

## 2021-03-31 RX ADMIN — BUPROPION HYDROCHLORIDE 100 MG: 100 TABLET, EXTENDED RELEASE ORAL at 09:13

## 2021-03-31 RX ADMIN — BUMETANIDE 4 MG: 2 TABLET ORAL at 09:13

## 2021-03-31 RX ADMIN — DICLOFENAC SODIUM 2 G: 10 GEL TOPICAL at 13:21

## 2021-03-31 RX ADMIN — FLUTICASONE FUROATE AND VILANTEROL TRIFENATATE 1 PUFF: 100; 25 POWDER RESPIRATORY (INHALATION) at 09:17

## 2021-03-31 RX ADMIN — HYDRALAZINE HYDROCHLORIDE 75 MG: 50 TABLET ORAL at 19:35

## 2021-03-31 RX ADMIN — METHOCARBAMOL 500 MG: 500 TABLET, FILM COATED ORAL at 19:35

## 2021-03-31 RX ADMIN — ISOSORBIDE DINITRATE 10 MG: 10 TABLET ORAL at 09:15

## 2021-03-31 RX ADMIN — GABAPENTIN 300 MG: 300 CAPSULE ORAL at 09:14

## 2021-03-31 RX ADMIN — ASPIRIN 81 MG CHEWABLE TABLET 81 MG: 81 TABLET CHEWABLE at 09:15

## 2021-03-31 RX ADMIN — POTASSIUM CHLORIDE 40 MEQ: 750 TABLET, EXTENDED RELEASE ORAL at 19:35

## 2021-03-31 RX ADMIN — ACETAMINOPHEN 650 MG: 325 TABLET, FILM COATED ORAL at 16:31

## 2021-03-31 RX ADMIN — UMECLIDINIUM 1 PUFF: 62.5 AEROSOL, POWDER ORAL at 09:17

## 2021-03-31 RX ADMIN — GABAPENTIN 600 MG: 300 CAPSULE ORAL at 22:48

## 2021-03-31 RX ADMIN — DICLOFENAC SODIUM 2 G: 10 GEL TOPICAL at 16:32

## 2021-03-31 RX ADMIN — POTASSIUM CHLORIDE 40 MEQ: 750 TABLET, EXTENDED RELEASE ORAL at 09:13

## 2021-03-31 RX ADMIN — DOCUSATE SODIUM 50 MG AND SENNOSIDES 8.6 MG 1 TABLET: 8.6; 5 TABLET, FILM COATED ORAL at 19:40

## 2021-03-31 RX ADMIN — DICLOFENAC SODIUM 2 G: 10 GEL TOPICAL at 09:15

## 2021-03-31 ASSESSMENT — ACTIVITIES OF DAILY LIVING (ADL)
ADLS_ACUITY_SCORE: 10

## 2021-03-31 ASSESSMENT — MIFFLIN-ST. JEOR: SCORE: 1784.65

## 2021-03-31 NOTE — SUMMARY OF CARE
-Pt working with therapies and active in his room.  -Pt tolerating adequate and nutritious meals.  -Pt eliminating adequately.  -Pt had  His drive line dressing changed.

## 2021-03-31 NOTE — CONSULTS
Health Psychology                  Clinic    Department of Medicine  Deepika Cook, PhD, LP [phone]                          [facility]  [facility] Sagrario eFng, PhD, LP [phone]                  3rd Floor  [place] Mail [address]   Parviz Hobbs, PhD, ABPP, LP [phone]     [address] [facility],   [address] [facility],  Rajani Bejarano,  PhD, LP [phone]            [place] [address] Jennifer Bahena, PhD, LP [phone]     Inpatient Health Psychology Consultation    SUBJECTIVE:  Mr. Willingham is a 65-year-old man with significant cardiac history hospitalized awaiting heart transplant.  Endorses intermittent low mood and challenges with the prolonged hospitalization.  Is coping by reading his Bible and listening to music.  He acknowledges doubt and fear at times about heart transplant but acknowledges that he magan with these via his Islam willa.  Acknowledges a benefit he is noticing at home is his wife developing a close relationship with his granddaughter.  He has not been able to see his wife in person due to her not visiting following a Covid exposure.  Provided support and encouraged ongoing engagement in values based activities to continue to manage the emotional impacts of illness.    OBJECTIVE:  Appearance/Behavior/Orientation: Alert and oriented to person, place, time, and situation.   Cognition/Memory/Judgment:  Not formally assessed, yet no difficulties apparent upon interview. Fund of knowledge consistent with age, level of education, and life experience. Abstract reasoning appropriate, no difficulties with judgment apparent.  Speech/Language: Speech was clear, logical and coherent, of normal rate, rhythm and volume.   Thought Content/Form:  Appropriate to interview and situation. Overall logical and organized.   Speech/Language: Speech was clear, logical and coherent, of normal rate, rhythm and volume.   Thought  Content/Form:  Appropriate to interview and situation. Overall logical and organized.   Mood/Affect:Mood mildly dysphoric; affect was mood congruent.  Insight/Motivation:  Appropriate to situation.     ASSESSMENT:  Coping and tolerating distress well.     DIAGNOSIS:  Adjustment disorder with depressed mood    PLAN:  Plan for health psychology to follow this patient approximately once per week for the duration of their hospitalization.     Rajani Bejarano, PhD, LP  Clinical Health Psychologist      Time in: 4:37  Time out: 4:55    In-person care

## 2021-03-31 NOTE — PROGRESS NOTES
The patient's HeartMate LVAD was interrogated 3/31/2021  * Speed 9600 rpm   * Pulsatility index 3.5   * Power 7.1 Manuel   * Flow 7.0 L/minute   Fluid status: mild hypervolemia   Alarms were reviewed, and notable for frequent PI event, no signs of pump malfunction.   The driveline exit site was inspected, c/d/i.   All external components were inspected and showed no evidence of damage or malfunction, none replaced.   No changes to VAD settings made

## 2021-03-31 NOTE — PROGRESS NOTES
CLINICAL NUTRITION SERVICES - REASSESSMENT NOTE     Nutrition Prescription    RECOMMENDATIONS FOR MDs/PROVIDERS TO ORDER:  Encourage intakes   Order the following (Sylvester-en-y Gastric Bypass) if pt agreeable:       --Multivitamin/minerals: adult dose 2 times daily       --Iron: 45-60 mg elemental daily (18-36 mg daily if low risk) - may partly or fully be covered in multivitamin        --Calcium Citrate containing vitamin D: 500 mg 3 times daily or 600 mg 2 times daily       --Vitamin B12: sublingual form of at least 500 mcg daily or injection of 1000 mcg monthly        --B-50 Complex daily  Consider checking the following labs with [facility] GB hx: Vitamins A, B1, B12, D, and E. Zinc, copper, ionized calcium, folic acid, and iron labs.  Malnutrition Status:    Patient does not meet two of the established criteria necessary for diagnosing malnutrition    Recommendations already ordered by Registered Dietitian (RD):  Continue current supplements     Future/Additional Recommendations:  Monitor PO intakes, wt trends and POC     EVALUATION OF THE PROGRESS TOWARD GOALS   Diet: 3 g Sodium  Supplements: 2 Glucerna at 2pm  Intake: 100% of most meals per flowsheet, 50% of breakfast 3/31      Pt reports no changes in appetite. Stated he is consistently ordering 3 meals/day and drinking 1-2 glucerna/day. Per flowsheet he is usually eating 100% of meals and ordering 2454kcal and 79g protein/health touch (3 day average not including supplements). His wife has not brought in food recently. Reports some menu fatigue but overall able to find choices he enjoys.     NEW FINDINGS   Weight: overall stable, slight fluctuations suspect 2/2 fluid shifts  Labs: Cr 1.66 (H), BU 45 (H) Mg 2.6 (H)  Meds: bumex, klor-con (40 mEq BID), senna-docusate, coumadin  GI: +BM 3/30     Wt Readings from Last 10 Encounters:   03/31/21 101.5 kg (223 lb 12.8 oz)   03/30/21 101.6 kg (224 lb)   03/18/21 98 kg (216 lb)   03/11/21 99.3 kg (219 lb)   02/05/21 98.3 kg  (216 lb 11.2 oz)   01/26/21 103.3 kg (227 lb 12.8 oz)   01/21/21 101.7 kg (224 lb 1.6 oz)   01/12/21 94.8 kg (209 lb)   01/11/21 95.1 kg (209 lb 9.6 oz)   12/18/20 110.2 kg (243 lb)   09/27/20 100.2 kg (221 lb)   03/05/20 116.8 kg (257 lb 8 oz)     MALNUTRITION  % Intake: Unable to assess  % Weight Loss: None noted  Subcutaneous Fat Loss: None observed  Muscle Loss: Temporal:  mild  Fluid Accumulation/Edema: 1+ (trace)  Malnutrition Diagnosis: Patient does not meet two of the established criteria necessary for diagnosing malnutrition    Previous Goals   Patient to consume % of nutritionally adequate meal trays TID, or the equivalent with supplements/snacks.  Evaluation: Met    Previous Nutrition Diagnosis  Predicted inadequate nutrient intake (protein-energy) related to food choices, LOS increasing risk for menu fatigue, and potential upcoming surgery and unknown NPO status duration.  Evaluation: No change    CURRENT NUTRITION DIAGNOSIS  Predicted inadequate nutrient intake (protein-energy) related to food choices, LOS increasing risk for menu fatigue, and potential upcoming surgery and unknown NPO status duration.    INTERVENTIONS  Implementation  Medical food supplement therapy    Goals  Patient to consume % of nutritionally adequate meal trays TID, or the equivalent with supplements/snacks.    Monitoring/Evaluation  Progress toward goals will be monitored and evaluated per protocol.    Rajani Briones MS, RD, LDN  Unit Pager [phone]

## 2021-03-31 NOTE — PROGRESS NOTES
[facility]   Cardiology II Service / Advanced Heart Failure  Daily Progress Note      Patient: Jim Willingham  MRN: [id number]  Admission Date: 2/8/2021  Hospital Day # 51    Assessment and Plan: Jim Willingham is a 65 year old male with history of NICM EF 20% c/b VT s/p CRT-D s/p HMII LVAD 6/19/2017 originally intended as destination therapy 2/2 obesity however with recent weight loss now candidate for transplantation. He is admitted for worsening functional status and renal function concerning for worsening heart failure. He is now listed status 2E for transplant.    Changes today:   - Bumex 4 mg po BID. If weight trends up in AM consider IV Bumex vs IV Diuril.   - Increased hydralazine to 75 mg TID yesterday     Chronic systolic heart failure secondary to NICM s/p HM II LVAD. Echo 1/8/21 at 9400rpm, EF<30%m LVIDd 6.8cm, at least mildly reduced RV function, AoV closed without AI, mild-mod eccentric MR, dilated IVC without collapse. RHC 3/23 RA 10 mPA 25 mPCWP 14 Kee CO/CI 6.5/3.1.  Stage D, NYHA Class III  ACEi/ARB contraindicated due to renal dysfunction. Isordil 10 mg po TID. Hydralazine increased to 75 mg po TID on 3/30  BB Contraindicated due to low cardiac output.   Aldosterone antagonist contraindicated due to renal dysfunction  SCD prophylaxis CRT-D  Fluid status: Mild hypervolemia. Bumex 4 mg po BID  MAP: Goal 65-85, has been above goal  LDH: 298 3/26  Anticoagulation: Coumadin per pharmacy. INR- 2.46, goal 2.5-3, goal increased in setting of power spikes and elevated flows this hospitalization.   Antiplatelet: ASA 81 mg po daily  - remains on the cardiac transplant list status 2E. Persistent escalation of diuretics in setting of progressive heart failure with refractory hypervolemia.   - Encouraged activity.   .     WALTER on CKD Stage III. Cr peaked at 2.22.  - Cr- 1.66 (1.71).     History of Afib. History of VT/VF. Asymptomatic bursts of questionable AF vs NSVT without hemodynamic  compromise. RHC 3/23 RA 10 mPA 25 mPCWP 14 Kee CO/CI 6.5/3.1. CHADSVASC-5.   - Continue Amiodarone 200 mg po daily.   - Coumadin as above.   - Device interrogation given increased frequency in arrhythmia, no arrhythmias noted.      DM type II, controlled. Symptomatic Hypoglycemia, resolved. Low Cortisol: Hgb A1C-6.2. Lantus and Novololg sliding scale insulin discontinued. Endo consult appreciated. C-peptide 13.9 an Proinsulin 18.4. Serum cortisol 7.7 3/6/21, cosyntropin stim test WNL per Endo.   -Per endo --> Fingerstick glucose might not be accurate for hypoglycemia, if patient has glucose <55 (without insulin), plasma glucose should be sent for confirmation of hypoglycemia prior to correction. While the patient is off insulin therapy, POC glucose readings above 60 are acceptable for the patient  - Serum glucose confirmation of hypoglycemia of 57 3/29/21  pt symptomatic. Endo notified and recommended dietary modifications.      Chronic/resolved conditions  COPD/Asthma: pta Breo Ellipta, albuterol prn.   Depression: pta Bupropion  Right sided weakness, resolved. CT head negative for acute findings. Appreciate Neuro eval.   Staph Hominis Bacteremia. No need for surveillance blood cx per transplant ID.    Rhinovirus, resolved. Bacterial bronchitis. Completed 5 day course of Cefdinir. COVID negative 2/8. Resp PCR +rhinovirus. CXR 2/12 with no overt PNA. Stopped cefdinir 2/15. Cleared for transplant per ID. Repeat RVP negative 3/5. COVID repeated due to sinus congestion, negative. Congestion improved with transition from Claritin to Zyrtec.   Carpel tunnel, bilateral s/p bilateral release. Evidence of thenar atrophy. Relief with steroid injection 11/20. Carpal tunnel release 2/18/21. Appreciate Ortho/Plastics consult, signed off. Continue Tylenol 650 mg po QID and Gabapentin 300 mg in AM, 600 mg in the afternoon, and 600 mg in the evening.      FEN: 3 gram sodium diet   PROPHY:  Coumadin  LINES:  PIV   DISPO:  TBD  "pending cardiac transplant.   CODE STATUS: Full Code   ================================================================    Interval History/ROS: He notes mild abdominal distention. No Le edema. He denies fever, chills, chest pain, palpitations, RESENDIZ, cough, nausea, vomiting, diarrhea, melena, hematochezia, and LE edema. No consitpations. Had some mild dizziness after standing from crouching yesterday. Overall complains of slightly increased orthostatic symptoms in the last 2 weeks. He is tolerating oral intake and ambulation.     Last 24 hr care team notes reviewed.   ROS:  4 point ROS including Respiratory, CV, GI and , other than that noted in the HPI, is negative.     Medications: Reviewed in EPIC.     Physical Exam:   /83 (BP Location: Left arm)   Pulse 70   Temp 97.8  F (36.6  C) (Oral)   Resp 18   Ht 1.727 m (5' 8\")   Wt 101.5 kg (223 lb 12.8 oz)   SpO2 98%   BMI 34.03 kg/m    GENERAL: Appears alert and interacting appropriatly.  HEENT: Eye symmetrical and free of discharge bilaterally. Mucous membranes moist and without lesions.  NECK: Supple and without lymphadenopathy. JVD lower 1/3 of neck upright.   CV: RRR, S1S2 present with LVAD hum.   RESPIRATORY: Respirations regular, even, and unlabored. Lungs CTA throughout.   GI: Soft and non distended with normoactive bowel sounds present in all quadrants. No tenderness, rebound, guarding. No organomegaly.   EXTREMITIES: Trace bilateral LE peripheral edema. 2+ bilateral pedal pulses.   NEUROLOGIC: Alert and interacting appropriatly.  No focal deficits.   MUSCULOSKELETAL: No joint swelling or tenderness.   SKIN: No jaundice. No rashes or lesions. LVAD drive line covered.     Data:  CMP  Recent Labs   Lab 03/31/21  0546 03/30/21  0627 03/29/21  0515 03/28/21  1049 03/28/21  0627    136 137  --  136   POTASSIUM 4.2 4.2 4.4  --  4.1   CHLORIDE 103 102 104  --  102   CO2 27 27 28  --  31   ANIONGAP 6 7 5  --  3   GLC 96 88 95 57* 82   BUN 45* 42* " 38*  --  39*   CR 1.66* 1.71* 1.56*  --  1.60*   GFRESTIMATED 43* 41* 46*  --  45*   GFRESTBLACK 50* 48* 54*  --  52*   QAMAR 8.7 8.8 8.5  --  8.6   MAG 2.6* 2.4* 2.6*  --  2.7*   PROTTOTAL  --   --  6.2*  --   --    ALBUMIN  --   --  3.2*  --   --    BILITOTAL  --   --  0.4  --   --    ALKPHOS  --   --  98  --   --    AST  --   --  32  --   --    ALT  --   --  34  --   --      CBC  Recent Labs   Lab 03/31/21  0546 03/29/21  0515 03/27/21  0549 03/25/21  0525   WBC 3.8* 4.1 4.1 4.0   RBC 4.06* 3.74* 3.89* 3.94*   HGB 12.1* 11.2* 11.8* 12.0*   HCT 37.9* 36.2* 37.5* 38.0*   MCV 93 97 96 96   MCH 29.8 29.9 30.3 30.5   MCHC 31.9 30.9* 31.5 31.6   RDW 16.3* 16.6* 16.8* 17.0*    174 175 200     INR  Recent Labs   Lab 03/31/21  0546 03/30/21  0627 03/29/21  0515 03/28/21  0627   INR 2.46* 2.59* 2.60* 2.45*       Patient discussed with Dr. Conte.      Didi Butler, PA-C  [facility]

## 2021-03-31 NOTE — PLAN OF CARE
D: Admitted 2/08 for worsening functional status and renal function concerning for worsening heart failure. Now listed status 2E on heart transplant list.   Hx: COPD, CKD3, DM2, NICM (EF 20%), VT/VF s/p CRT-D s/p LVAD HM2 2017 initially as destination therapy d/t obesity, but pt now eligible for transplant due to weight loss.     I: Monitored vitals and assessed pt status.   Changed: Tele monitor tech reported 5 beats VT at ~11:55 pm   PRN: Robaxin x1, Tylenol x1, melatonin at HS     A: A0x4. VSS on RA/home CPAP at night. Tele shows SR, rate 60-70s. LVAD #'s WNL, no alarms overnight, dressing CDI. Flow and power spike up to 7's at times. Denies dizziness. Afebrile. Urinating adequately. BG checks twice daily and at bedtime. HS BG check 90, meal given. C/o bilateral shoulder pain, well controlled by current regimen. Up independently. Do not disturb orders respected from [id number]. Able to make needs known.      P: Continue to monitor Pt status and report changes to Cards 2.      [phone]  Rajani Holman RN on 3/31/2021 at 6:14 AM

## 2021-04-01 LAB
ANION GAP SERPL CALCULATED.3IONS-SCNC: 5 MMOL/L (ref 3–14)
BUN SERPL-MCNC: 43 MG/DL (ref 7–30)
CALCIUM SERPL-MCNC: 8.8 MG/DL (ref 8.5–10.1)
CHLORIDE SERPL-SCNC: 103 MMOL/L (ref 94–109)
CO2 SERPL-SCNC: 28 MMOL/L (ref 20–32)
CREAT SERPL-MCNC: 1.73 MG/DL (ref 0.66–1.25)
GFR SERPL CREATININE-BSD FRML MDRD: 41 ML/MIN/{1.73_M2}
GLUCOSE BLDC GLUCOMTR-MCNC: 105 MG/DL (ref 70–99)
GLUCOSE BLDC GLUCOMTR-MCNC: 171 MG/DL (ref 70–99)
GLUCOSE BLDC GLUCOMTR-MCNC: 47 MG/DL (ref 70–99)
GLUCOSE BLDC GLUCOMTR-MCNC: 92 MG/DL (ref 70–99)
GLUCOSE BLDC GLUCOMTR-MCNC: 96 MG/DL (ref 70–99)
GLUCOSE SERPL-MCNC: 95 MG/DL (ref 70–99)
INR PPP: 2.43 (ref 0.86–1.14)
MAGNESIUM SERPL-MCNC: 2.6 MG/DL (ref 1.6–2.3)
POTASSIUM SERPL-SCNC: 4.3 MMOL/L (ref 3.4–5.3)
SODIUM SERPL-SCNC: 136 MMOL/L (ref 133–144)

## 2021-04-01 PROCEDURE — 250N000009 HC RX 250: Performed by: NURSE PRACTITIONER

## 2021-04-01 PROCEDURE — 93750 INTERROGATION VAD IN PERSON: CPT | Performed by: PHYSICIAN ASSISTANT

## 2021-04-01 PROCEDURE — 99232 SBSQ HOSP IP/OBS MODERATE 35: CPT | Mod: 24 | Performed by: PHYSICIAN ASSISTANT

## 2021-04-01 PROCEDURE — 83735 ASSAY OF MAGNESIUM: CPT | Performed by: STUDENT IN AN ORGANIZED HEALTH CARE EDUCATION/TRAINING PROGRAM

## 2021-04-01 PROCEDURE — 250N000013 HC RX MED GY IP 250 OP 250 PS 637: Performed by: NURSE PRACTITIONER

## 2021-04-01 PROCEDURE — 99232 SBSQ HOSP IP/OBS MODERATE 35: CPT | Mod: 24 | Performed by: INTERNAL MEDICINE

## 2021-04-01 PROCEDURE — 999N001017 HC STATISTIC GLUCOSE BY METER IP

## 2021-04-01 PROCEDURE — 250N000013 HC RX MED GY IP 250 OP 250 PS 637: Performed by: STUDENT IN AN ORGANIZED HEALTH CARE EDUCATION/TRAINING PROGRAM

## 2021-04-01 PROCEDURE — 250N000013 HC RX MED GY IP 250 OP 250 PS 637: Performed by: PHYSICIAN ASSISTANT

## 2021-04-01 PROCEDURE — 36415 COLL VENOUS BLD VENIPUNCTURE: CPT | Performed by: STUDENT IN AN ORGANIZED HEALTH CARE EDUCATION/TRAINING PROGRAM

## 2021-04-01 PROCEDURE — 80048 BASIC METABOLIC PNL TOTAL CA: CPT | Performed by: STUDENT IN AN ORGANIZED HEALTH CARE EDUCATION/TRAINING PROGRAM

## 2021-04-01 PROCEDURE — 85610 PROTHROMBIN TIME: CPT | Performed by: STUDENT IN AN ORGANIZED HEALTH CARE EDUCATION/TRAINING PROGRAM

## 2021-04-01 PROCEDURE — 250N000013 HC RX MED GY IP 250 OP 250 PS 637: Performed by: INTERNAL MEDICINE

## 2021-04-01 PROCEDURE — 214N000001 HC R&B CCU UMMC

## 2021-04-01 RX ORDER — CAPSAICIN 0.75 MG/G
CREAM TOPICAL 3 TIMES DAILY PRN
Status: DISCONTINUED | OUTPATIENT
Start: 2021-04-01 | End: 2021-05-31 | Stop reason: HOSPADM

## 2021-04-01 RX ORDER — ACARBOSE 25 MG/1
25 TABLET ORAL
Status: DISCONTINUED | OUTPATIENT
Start: 2021-04-02 | End: 2021-04-05

## 2021-04-01 RX ADMIN — ACETAMINOPHEN 650 MG: 325 TABLET, FILM COATED ORAL at 08:30

## 2021-04-01 RX ADMIN — ISOSORBIDE DINITRATE 10 MG: 10 TABLET ORAL at 13:35

## 2021-04-01 RX ADMIN — CAPSAICIN: 0.75 CREAM TOPICAL at 13:35

## 2021-04-01 RX ADMIN — WARFARIN SODIUM 9 MG: 5 TABLET ORAL at 18:35

## 2021-04-01 RX ADMIN — BUPROPION HYDROCHLORIDE 100 MG: 100 TABLET, EXTENDED RELEASE ORAL at 08:20

## 2021-04-01 RX ADMIN — ASPIRIN 81 MG CHEWABLE TABLET 81 MG: 81 TABLET CHEWABLE at 08:20

## 2021-04-01 RX ADMIN — HYDRALAZINE HYDROCHLORIDE 75 MG: 50 TABLET ORAL at 20:05

## 2021-04-01 RX ADMIN — UMECLIDINIUM 1 PUFF: 62.5 AEROSOL, POWDER ORAL at 08:22

## 2021-04-01 RX ADMIN — Medication 10 MG: at 21:44

## 2021-04-01 RX ADMIN — HYDRALAZINE HYDROCHLORIDE 75 MG: 50 TABLET ORAL at 13:35

## 2021-04-01 RX ADMIN — ISOSORBIDE DINITRATE 10 MG: 10 TABLET ORAL at 08:20

## 2021-04-01 RX ADMIN — AMIODARONE HYDROCHLORIDE 200 MG: 200 TABLET ORAL at 08:20

## 2021-04-01 RX ADMIN — ALLOPURINOL 300 MG: 300 TABLET ORAL at 08:20

## 2021-04-01 RX ADMIN — BUMETANIDE 4 MG: 2 TABLET ORAL at 08:20

## 2021-04-01 RX ADMIN — GABAPENTIN 600 MG: 300 CAPSULE ORAL at 13:35

## 2021-04-01 RX ADMIN — DOCUSATE SODIUM 50 MG AND SENNOSIDES 8.6 MG 1 TABLET: 8.6; 5 TABLET, FILM COATED ORAL at 20:05

## 2021-04-01 RX ADMIN — POTASSIUM CHLORIDE 40 MEQ: 750 TABLET, EXTENDED RELEASE ORAL at 08:20

## 2021-04-01 RX ADMIN — ACETAMINOPHEN 650 MG: 325 TABLET, FILM COATED ORAL at 20:05

## 2021-04-01 RX ADMIN — METHOCARBAMOL 500 MG: 500 TABLET, FILM COATED ORAL at 21:44

## 2021-04-01 RX ADMIN — FLUTICASONE FUROATE AND VILANTEROL TRIFENATATE 1 PUFF: 100; 25 POWDER RESPIRATORY (INHALATION) at 08:22

## 2021-04-01 RX ADMIN — MONTELUKAST 10 MG: 10 TABLET, FILM COATED ORAL at 21:44

## 2021-04-01 RX ADMIN — GABAPENTIN 600 MG: 300 CAPSULE ORAL at 21:44

## 2021-04-01 RX ADMIN — ISOSORBIDE DINITRATE 10 MG: 10 TABLET ORAL at 20:05

## 2021-04-01 RX ADMIN — LIDOCAINE 2 PATCH: 560 PATCH PERCUTANEOUS; TOPICAL; TRANSDERMAL at 08:22

## 2021-04-01 RX ADMIN — BUPROPION HYDROCHLORIDE 100 MG: 100 TABLET, EXTENDED RELEASE ORAL at 20:05

## 2021-04-01 RX ADMIN — HYDRALAZINE HYDROCHLORIDE 75 MG: 50 TABLET ORAL at 08:20

## 2021-04-01 RX ADMIN — ANAKINRA 100 MG: 100 INJECTION, SOLUTION SUBCUTANEOUS at 08:21

## 2021-04-01 RX ADMIN — BUMETANIDE 4 MG: 2 TABLET ORAL at 13:35

## 2021-04-01 RX ADMIN — GABAPENTIN 300 MG: 300 CAPSULE ORAL at 08:20

## 2021-04-01 RX ADMIN — CETIRIZINE HYDROCHLORIDE 10 MG: 10 TABLET, FILM COATED ORAL at 08:20

## 2021-04-01 RX ADMIN — POTASSIUM CHLORIDE 40 MEQ: 750 TABLET, EXTENDED RELEASE ORAL at 20:04

## 2021-04-01 ASSESSMENT — ACTIVITIES OF DAILY LIVING (ADL)
ADLS_ACUITY_SCORE: 10

## 2021-04-01 ASSESSMENT — MIFFLIN-ST. JEOR: SCORE: 1779.21

## 2021-04-01 NOTE — PROGRESS NOTES
LVAD/Transplant Social Work Services Progress Note      Date of Initial Social Work Evaluation: 2/10/2021  Collaborated with: Cards 2 AFSANEH and pt     Data: Pt is an LVAD pt now listed status 2E for heart transplant. Pt had bilateral carpal tunnel surgery 2/20. Pt has had some concerns with depression, due to prolonged hospitalization.     Intervention: Supportive Visit   Assessment: Pt is doing well today and has no concerns with mood. He continues to have great support from family. Pt participated in virtual heart transplant support group today and through this group was also connected to a virtual bible study group that starts this evening. Pt reports he has gradually been increasing his steps per day and self monitoring on his watch. Writer continues to encourage pt to keep up his exercise.   Education provided by SW: Ongoing Social Work support  Plan:    Discharge Plans in Progress: None     Barriers to d/c plan: Awaiting Heart Transplant    Follow up Plan: SW to continue to follow to assess coping and mental health needs.

## 2021-04-01 NOTE — PLAN OF CARE
D:  Pt admitted on 2/08/2021 for worsening functional status and renal function concerning for worsening heart failure. He is now listed status 2E for transplant. PMH of CKD stage III, Afib, VT/VF, DM type II, CECILIA, COPD, chronic systolic heart failure 2/2 NICM EF 20% c/b VT s/p CRT-D s/p HMII LVAD 6/19/2017 originally intended as destination therapy 2/2 obesity however with recent weight loss now candidate for transplantation      I: Monitored vitals and assessed pt status.        Vitals:  Temp: 97.8  F (36.6  C) Temp src: Oral BP: (!) 85/73 Pulse: 85   Resp: 18 SpO2: 95 % O2 Device: None (Room air)         A:   Neuro: A&O x 4. Neurologically intact. denies Headache, dizziness, and lightheadedness.  Report numbness and tingling on upper and lower extremities (baseline) . calls appropriately   Cardiac: SR with 1 degree AVB. HM II LVAD. LVAD #'s WDL free of alarms. Daily dressing change completed. Denies chest pain. VSS  Respiratory: sating >92% on RA. RESENDIZ  Diet/appetite: 3 gm NA Diet, good appetite   Endocrine: BS check ACHS  GI/: No BM this shift. LBM 4/1/2021 denies abdominal pain and nausea. Good urine output, voids without difficulty   Activity: independent.   Pain: Denies pain   Skin: No new deficit noted   LDAs: PIV        P: Continue to monitor Pt status and report changes to treatment team.

## 2021-04-01 NOTE — PLAN OF CARE
D: Admitted 2/08 for worsening functional status and renal function concerning for worsening heart failure. Now listed status 2E on heart transplant list.   Hx: COPD, CKD3, DM2, NICM (EF 20%), VT/VF s/p CRT-D s/p LVAD HM2 2017 initially as destination therapy d/t obesity, but pt now eligible for transplant due to weight loss.     I: Monitored vitals and assessed pt status.   PRN: Robaxin, Tylenol, Melatonin  Tele: SR w/ 1st degree AVB  O2: RA, home CPAP at night  Mobility: independent     A: A0x4. VSS. LVAD #'s WNL. No alarms overnight. Flow and power spike up to 7's at times. Denies dizziness. Afebrile. Urinating adequately. LBM 3/31. 3g Na diet. BG checks twice daily and at bedtime. C/o bilateral shoulder pain. Do not disturb orders respected from [id number]. Ambulated in halls several times. Able to make needs known.      P: Continue to monitor Pt status and report changes to Cards 2.

## 2021-04-01 NOTE — PLAN OF CARE
Patient status 2E for heart transplant. Medical history includes NICM EF 20% c/b VT s/p CRT-D s/p HMII LVAD 6/19/2017. VAD PI's, flow numbers elevated intermittently; hypoglycemic on shift. Cards 2 informed. VSS, MAPs in the 70's. Lidocaine patches and Tylenol for lower back pain. Capsacin cream available. VAD alarms checked and in place. Endocrine MD meeting today via iPad. Continuing to monitor.

## 2021-04-01 NOTE — PROGRESS NOTES
[facility]   Cardiology II Service / Advanced Heart Failure  Daily Progress Note      Patient: Jim Willingham  MRN: [id number]  Admission Date: 2/8/2021  Hospital Day # 52    Assessment and Plan: Jim Willingham is a 65 year old male with history of NICM EF 20% c/b VT s/p CRT-D s/p HMII LVAD 6/19/2017 originally intended as destination therapy 2/2 obesity however with recent weight loss now candidate for transplantation. He is admitted for worsening functional status and renal function concerning for worsening heart failure. He is now listed status 2E for transplant.    Changes today:   - Continue bumex 4 mg PO BID  - Continue current hydralazine with hold parameters  - Will discuss his ongoing hypoglycemia with endocrinology  - Adding capsaicin cream for shoulder pain     Chronic systolic heart failure secondary to NICM s/p HM II LVAD. Echo 1/8/21 at 9400rpm, EF<30%m LVIDd 6.8cm, at least mildly reduced RV function, AoV closed without AI, mild-mod eccentric MR, dilated IVC without collapse. RHC 3/23 RA 10 mPA 25 mPCWP 14 Kee CO/CI 6.5/3.1.  Stage D, NYHA Class III  ACEi/ARB contraindicated due to renal dysfunction. Isordil 10 mg po TID. Hydralazine increased to 75 mg po TID on 3/30  BB Contraindicated due to low cardiac output.   Aldosterone antagonist contraindicated due to renal dysfunction  SCD prophylaxis CRT-D  Fluid status: Near euvolemic Bumex 4 mg po BID  MAP: Goal 65-85, has been above goal  LDH: 298 3/26  Anticoagulation: Coumadin per pharmacy. INR- 2.43, goal 2.5-3, goal increased in setting of power spikes and elevated flows this hospitalization.   Antiplatelet: ASA 81 mg po daily  - remains on the cardiac transplant list status 2E. Persistent escalation of diuretics in setting of progressive heart failure with refractory hypervolemia.   - Encouraged activity.   .     WALTER on CKD Stage III. Cr peaked at 2.22.  - Cr- 1.73 (1.66).     History of Afib. History of VT/VF. Asymptomatic bursts of  questionable AF vs NSVT without hemodynamic compromise. RHC 3/23 RA 10 mPA 25 mPCWP 14 Kee CO/CI 6.5/3.1. CHADSVASC-5.   - Continue Amiodarone 200 mg po daily.   - Coumadin as above.   - Device interrogation 3/29 given increased frequency in arrhythmia, no arrhythmias noted.      DM type II, controlled. Symptomatic Hypoglycemia, resolved. Low Cortisol: Hgb A1C-6.2. Lantus and Novololg sliding scale insulin discontinued. Endo consult appreciated. C-peptide 13.9 an Proinsulin 18.4. Serum cortisol 7.7 3/6/21, cosyntropin stim test WNL per Endo.   -Per endo --> Fingerstick glucose might not be accurate for hypoglycemia, if patient has glucose <55 (without insulin), plasma glucose should be sent for confirmation of hypoglycemia prior to correction. While the patient is off insulin therapy, POC glucose readings above 60 are acceptable for the patient  - Serum glucose confirmation of hypoglycemia of 57 3/29/21  pt symptomatic. Endo notified and recommended dietary modifications. Recurrent despite dietary recommendations, will discuss again with endo today.     Chronic/resolved conditions  COPD/Asthma: pta Breo Ellipta, albuterol prn.   Depression: pta Bupropion  Right sided weakness, resolved. CT head negative for acute findings. Appreciate Neuro eval.   Staph Hominis Bacteremia. No need for surveillance blood cx per transplant ID.    Rhinovirus, resolved. Bacterial bronchitis. Completed 5 day course of Cefdinir. COVID negative 2/8. Resp PCR +rhinovirus. CXR 2/12 with no overt PNA. Stopped cefdinir 2/15. Cleared for transplant per ID. Repeat RVP negative 3/5. COVID repeated due to sinus congestion, negative. Congestion improved with transition from Claritin to Zyrtec.   Carpel tunnel, bilateral s/p bilateral release. Evidence of thenar atrophy. Relief with steroid injection 11/20. Carpal tunnel release 2/18/21. Appreciate Ortho/Plastics consult, signed off. Continue Tylenol 650 mg po QID and Gabapentin 300 mg in AM, 600  "mg in the afternoon, and 600 mg in the evening.      FEN: 3 gram sodium diet   PROPHY:  Coumadin  LINES:  PIV   DISPO:  TBD pending cardiac transplant.   CODE STATUS: Full Code   ================================================================    Interval History/ROS: He notes mild abdominal distention. No Le edema. He denies fever, chills, chest pain, palpitations, RESENDIZ, cough, nausea, vomiting, diarrhea, melena, hematochezia, and LE edema. Still slight dizziness with position changes, but overall improved from earlier this week. He is tolerating oral intake and ambulation.     Last 24 hr care team notes reviewed.   ROS:  4 point ROS including Respiratory, CV, GI and , other than that noted in the HPI, is negative.     Medications: Reviewed in EPIC.     Physical Exam:   BP (!) 76/59 (BP Location: Right arm)   Pulse 74   Temp 98.7  F (37.1  C) (Oral)   Resp 18   Ht 1.727 m (5' 8\")   Wt 101 kg (222 lb 9.6 oz)   SpO2 94%   BMI 33.85 kg/m    GENERAL: Appears alert and interacting appropriatly.  HEENT: Eye symmetrical and free of discharge bilaterally. Mucous membranes moist and without lesions.  NECK: Supple and without lymphadenopathy. JVD lower 1/3 of neck upright, unchanged from prior exam  CV: RRR, S1S2 present with LVAD hum.   RESPIRATORY: Respirations regular, even, and unlabored. Lungs CTA throughout.   GI: Soft and non distended with normoactive bowel sounds present in all quadrants. No tenderness, rebound, guarding. No organomegaly.   EXTREMITIES: Trace bilateral LE peripheral edema. All extremities are warm and well perfused.  NEUROLOGIC: Alert and interacting appropriatly.  No focal deficits.   MUSCULOSKELETAL: No joint swelling or tenderness.   SKIN: No jaundice. No rashes or lesions. LVAD drive line covered.     Data:  CMP  Recent Labs   Lab 04/01/21  0605 03/31/21  0546 03/30/21  0627 03/29/21  0515    137 136 137   POTASSIUM 4.3 4.2 4.2 4.4   CHLORIDE 103 103 102 104   CO2 28 27 27 28 "   ANIONGAP 5 6 7 5   GLC 95 96 88 95   BUN 43* 45* 42* 38*   CR 1.73* 1.66* 1.71* 1.56*   GFRESTIMATED 41* 43* 41* 46*   GFRESTBLACK 47* 50* 48* 54*   QAMAR 8.8 8.7 8.8 8.5   MAG 2.6* 2.6* 2.4* 2.6*   PROTTOTAL  --   --   --  6.2*   ALBUMIN  --   --   --  3.2*   BILITOTAL  --   --   --  0.4   ALKPHOS  --   --   --  98   AST  --   --   --  32   ALT  --   --   --  34     CBC  Recent Labs   Lab 03/31/21  0546 03/29/21  0515 03/27/21  0549   WBC 3.8* 4.1 4.1   RBC 4.06* 3.74* 3.89*   HGB 12.1* 11.2* 11.8*   HCT 37.9* 36.2* 37.5*   MCV 93 97 96   MCH 29.8 29.9 30.3   MCHC 31.9 30.9* 31.5   RDW 16.3* 16.6* 16.8*    174 175     INR  Recent Labs   Lab 04/01/21  0605 03/31/21  0546 03/30/21  0627 03/29/21  0515   INR 2.43* 2.46* 2.59* 2.60*       Patient discussed with Dr. Conte.      Didi Butler PA-C  [facility] Card[facility]

## 2021-04-01 NOTE — PROGRESS NOTES
The patient's HeartMate LVAD was interrogated 4/1/2021  * Speed 9600 rpm   * Pulsatility index 6.3   * Power 6.7 Manuel   * Flow 3.3 L/minute   Fluid status: Euvolemic   Alarms were reviewed, and notable for frequent PI events, one low voltage alarm associated with changing his batteries. No other alarms  The driveline exit site was inspected, c/d/i.   All external components were inspected and showed no evidence of damage or malfunction, none replaced.   No changes to VAD settings made

## 2021-04-01 NOTE — PROGRESS NOTES
"SPIRITUAL HEALTH SERVICES  SPIRITUAL ASSESSMENT Progress Note  [facility] ([place]) 6C     PRIMARY FOCUS:     Emotional/spiritual/Religion distress    Support for coping    ILLNESS CIRCUMSTANCES:   Reviewed documentation. Reflective conversation shared with patient Jim Willingham which integrated elements of illness and family narratives.     Context of Serious Illness/Symptom(s) - pt on heart transplant list, said \"today was kind of a difficult day - my blood sugar was way down...\"     Resources for Support - pt identifies strong support from family, friends, and [employer]. Pt also said he is enjoying attending the virtual meetings of the transplant support group.    DISTRESS:     Emotional/Spiritual/Existential Distress - pt talked in-depth about his concerns and wonderings regarding donors and their families. He has a nuanced view of how \"God's plan\" fits in with the tragic situation of the loss a donor's family experiences - \"I see it as God helping everyone find something good and lifegiving in the midst of tragedy and loss...\"     Episcopal Distress - pt expressed a yearning to be a good example of ivonne for others. We talked about how pt can also cultivate an attitude of forgiveness towards himself when he doesn't live up to his own ideals.     Social/Cultural/Economic Distress - Not Discussed     SPIRITUAL/Rastafarian COPING:     Mu-ism/Ivonne - pt said his Sikhism ivonne sustains him \"to get through the down times - I truly feel God's presence in my life and it keeps me going...\" Prayer was welcomed - I also shared a Sikhism praise hymn as part of the prayer.    Spiritual Practice(s) - scripture reading, conversation with friends about spiritual matters, prayer, listening to Sikhism music are all important for pt's spiritual practice.    Emotional/Relational/Existential Connections - pt deeply connected with spouse and granddaughter.     GOALS OF CARE:    Goals of Care - restorative.    Meaning/Sense-Making - " pt says he benefits from being able to talk and pray about his spiritual concerns.    PLAN:  Continue to follow, visiting pt at least weekly while he is on unit.    Parviz Early) Jg Smith M.Div., [facility]  Staff   Pager [phone]

## 2021-04-01 NOTE — PROGRESS NOTES
"Endocrine Progress Note:          Assessment/Plan:     Jim Willingham is a 65 year old male with PMHx of non-ischemic cardiomyopathy with EF 20% c/b VT s/p CRT-D, placement of LVAD on 6/19/2017, chronic kidney disease stage III, type II diabetes, atrial fibrillation, COPD, depression, gastric bypass in 2003. Admitted with worsening of functional status, WALTER. on CKD,  with concern for worsening heart failure, he is now a transplant candidate and has refractory hypervolemia.     # Concern for hypoglycemia, in a patient with history of type II diabetes:  # History of bariatric surgery 2003, 363 lbs down to 220 lbs, then back to 260 lbs, now 222 lbs - total lost 140 lbs    It appears he has postbariatric hypoglycemia based on his history, diary of symptoms that he is paying attention lately, timing and his self experimentation with mixed meal for breakfast on Friday and Sunday. He experienced an episode of reactive hypoglycemia with glucose dipping down to 47 on the morning of 4/1.     Of note, other causes were worked up in initial consult by our team. \"Last HbA1c was 6.2% on 1/7/21. He has not received insulin glargine since 2/10.Last dose of insulin aspart was on 3/01  C-peptide and proinsulin levels were checked for assessment of hypoglycemia, 13.9 and 18.4 respectively (the patient was normoglycemic with post prandial plasma glucose of 84 at that time- after treatment for hypoglycemia). Adrenal insufficiency ruled out (see below)\"         Plan:    - Diet modification has been suggested to patient to avoid post prandial hypoglycemia  - Eat food with more fibres. Avoid meals with high carbs, nutrition consult requested to suggest diet choices that are lower carb (about  grams per day)  -Start acarbose 25mg daily with breakfast    # Low AM cortisol.  History of prolonged corticosteroid use (recently discontinued)  Had been on Prednisone 20 mg daily for about 7 years (for management of pain secondary to carpal " "tunnel syndrome), tapered down from Sep 2020 and stopped in Feb 2021    AM cortisol less than 10  cosyntropin stimulation test (250 mcg) done 3/8 showed appropriate stimulation of adrenals.               Baseline cortisol 7.4             Cortisol at 60 min 23.9     Endocrine team will sign off.    Please do not hesitate to contact us if these measures doesn't help him.      Tori Vasquez, MS4    Attending tie-in note  I saw the patient with Pedro MALONE4 and directly examined patient and discussed. Agree above note and plan.   Start acarbose 25 mg for breakfast.      Jacque Mims MD  Staff Physician  Endocrinology and Metabolism  [facility] [employer]  License[id number]  Pager: [phone]          Interval History:     He is a respiratory therapist by occupation. Since last endocrine consult/visit and recommendations, he is paying attention to food that he is eating. Today, he experienced post-prandial hypoglycemia after eating a carbohydrate-heavy breakfast. He notices this only after breakfast, not with lunch and dinner which has more vegetables, meat.     He ordered similar food few days ago, he felt same symptoms on Friday and Sunday. He tried same food today to experiment it and confirm if this comes after eating food or not. At home, when it happens, takes peppermint, butterfinger candybar.    Orders Placed This Encounter      Combination Diet 3 gm NA Diet    ROS:    Review of pertinent systems was negative except as noted above.          Exam:      Blood pressure (!) 76/59, pulse 74, temperature 98.7  F (37.1  C), temperature source Oral, resp. rate 18, height 1.727 m (5' 8\"), weight 101 kg (222 lb 9.6 oz), SpO2 94 %.  General: Alert, communicating clearly.   Face: no edema  Resp: no acute distress  Neuro: intact         Data:     Lab Results   Component Value Date    A1C 6.2 01/07/2021    A1C 5.7 11/05/2020    A1C 7.5 08/05/2020    A1C 7.0 01/21/2020    A1C 6.7 05/24/2017       Recent Labs   Lab " 04/01/21  1149 04/01/21  0956 04/01/21  0931 04/01/21  0605 03/31/21  2140 03/31/21  1818 03/31/21  0546 03/30/21  2159 03/30/21  0627 03/30/21  0627 03/29/21  0515 03/29/21  0515 03/28/21  1049 03/28/21  1049 03/28/21  0627 03/28/21  0627   GLC  --   --   --  95  --   --  96  --   --  88  --  95  --  57*  --  82   BGM 96 105* 47*  --  106* 120*  --  90   < >  --    < >  --    < >  --    < >  --     < > = values in this interval not displayed.

## 2021-04-02 ENCOUNTER — APPOINTMENT (OUTPATIENT)
Dept: PHYSICAL THERAPY | Facility: CLINIC | Age: 66
DRG: 001 | End: 2021-04-02
Attending: INTERNAL MEDICINE
Payer: COMMERCIAL

## 2021-04-02 LAB
ANION GAP SERPL CALCULATED.3IONS-SCNC: 7 MMOL/L (ref 3–14)
BUN SERPL-MCNC: 40 MG/DL (ref 7–30)
CALCIUM SERPL-MCNC: 9.1 MG/DL (ref 8.5–10.1)
CHLORIDE SERPL-SCNC: 103 MMOL/L (ref 94–109)
CO2 SERPL-SCNC: 26 MMOL/L (ref 20–32)
CREAT SERPL-MCNC: 1.53 MG/DL (ref 0.66–1.25)
ERYTHROCYTE [DISTWIDTH] IN BLOOD BY AUTOMATED COUNT: 16.3 % (ref 10–15)
GFR SERPL CREATININE-BSD FRML MDRD: 47 ML/MIN/{1.73_M2}
GLUCOSE BLDC GLUCOMTR-MCNC: 100 MG/DL (ref 70–99)
GLUCOSE BLDC GLUCOMTR-MCNC: 111 MG/DL (ref 70–99)
GLUCOSE BLDC GLUCOMTR-MCNC: 126 MG/DL (ref 70–99)
GLUCOSE BLDC GLUCOMTR-MCNC: 130 MG/DL (ref 70–99)
GLUCOSE BLDC GLUCOMTR-MCNC: 132 MG/DL (ref 70–99)
GLUCOSE BLDC GLUCOMTR-MCNC: 162 MG/DL (ref 70–99)
GLUCOSE BLDC GLUCOMTR-MCNC: 35 MG/DL (ref 70–99)
GLUCOSE BLDC GLUCOMTR-MCNC: 56 MG/DL (ref 70–99)
GLUCOSE BLDC GLUCOMTR-MCNC: 87 MG/DL (ref 70–99)
GLUCOSE SERPL-MCNC: 82 MG/DL (ref 70–99)
HCT VFR BLD AUTO: 38.6 % (ref 40–53)
HGB BLD-MCNC: 12.2 G/DL (ref 13.3–17.7)
INR PPP: 2.39 (ref 0.86–1.14)
LABORATORY COMMENT REPORT: NORMAL
LDH SERPL L TO P-CCNC: 309 U/L (ref 85–227)
MAGNESIUM SERPL-MCNC: 2.5 MG/DL (ref 1.6–2.3)
MCH RBC QN AUTO: 29.2 PG (ref 26.5–33)
MCHC RBC AUTO-ENTMCNC: 31.6 G/DL (ref 31.5–36.5)
MCV RBC AUTO: 92 FL (ref 78–100)
PLATELET # BLD AUTO: 190 10E9/L (ref 150–450)
POTASSIUM SERPL-SCNC: 3.9 MMOL/L (ref 3.4–5.3)
RBC # BLD AUTO: 4.18 10E12/L (ref 4.4–5.9)
SARS-COV-2 RNA RESP QL NAA+PROBE: NEGATIVE
SODIUM SERPL-SCNC: 136 MMOL/L (ref 133–144)
SPECIMEN SOURCE: NORMAL
WBC # BLD AUTO: 3.7 10E9/L (ref 4–11)

## 2021-04-02 PROCEDURE — 99232 SBSQ HOSP IP/OBS MODERATE 35: CPT | Mod: GC | Performed by: INTERNAL MEDICINE

## 2021-04-02 PROCEDURE — 250N000013 HC RX MED GY IP 250 OP 250 PS 637: Performed by: NURSE PRACTITIONER

## 2021-04-02 PROCEDURE — 93750 INTERROGATION VAD IN PERSON: CPT | Performed by: PHYSICIAN ASSISTANT

## 2021-04-02 PROCEDURE — 85027 COMPLETE CBC AUTOMATED: CPT | Performed by: STUDENT IN AN ORGANIZED HEALTH CARE EDUCATION/TRAINING PROGRAM

## 2021-04-02 PROCEDURE — 83615 LACTATE (LD) (LDH) ENZYME: CPT | Performed by: STUDENT IN AN ORGANIZED HEALTH CARE EDUCATION/TRAINING PROGRAM

## 2021-04-02 PROCEDURE — 36415 COLL VENOUS BLD VENIPUNCTURE: CPT | Performed by: STUDENT IN AN ORGANIZED HEALTH CARE EDUCATION/TRAINING PROGRAM

## 2021-04-02 PROCEDURE — 250N000013 HC RX MED GY IP 250 OP 250 PS 637: Performed by: INTERNAL MEDICINE

## 2021-04-02 PROCEDURE — 99232 SBSQ HOSP IP/OBS MODERATE 35: CPT | Mod: 24 | Performed by: PHYSICIAN ASSISTANT

## 2021-04-02 PROCEDURE — 83735 ASSAY OF MAGNESIUM: CPT | Performed by: STUDENT IN AN ORGANIZED HEALTH CARE EDUCATION/TRAINING PROGRAM

## 2021-04-02 PROCEDURE — U0005 INFEC AGEN DETEC AMPLI PROBE: HCPCS | Performed by: INTERNAL MEDICINE

## 2021-04-02 PROCEDURE — 250N000009 HC RX 250: Performed by: NURSE PRACTITIONER

## 2021-04-02 PROCEDURE — 80048 BASIC METABOLIC PNL TOTAL CA: CPT | Performed by: STUDENT IN AN ORGANIZED HEALTH CARE EDUCATION/TRAINING PROGRAM

## 2021-04-02 PROCEDURE — 250N000013 HC RX MED GY IP 250 OP 250 PS 637: Performed by: STUDENT IN AN ORGANIZED HEALTH CARE EDUCATION/TRAINING PROGRAM

## 2021-04-02 PROCEDURE — 214N000001 HC R&B CCU UMMC

## 2021-04-02 PROCEDURE — 250N000013 HC RX MED GY IP 250 OP 250 PS 637: Performed by: PHYSICIAN ASSISTANT

## 2021-04-02 PROCEDURE — 85610 PROTHROMBIN TIME: CPT | Performed by: STUDENT IN AN ORGANIZED HEALTH CARE EDUCATION/TRAINING PROGRAM

## 2021-04-02 PROCEDURE — 999N001017 HC STATISTIC GLUCOSE BY METER IP

## 2021-04-02 PROCEDURE — U0003 INFECTIOUS AGENT DETECTION BY NUCLEIC ACID (DNA OR RNA); SEVERE ACUTE RESPIRATORY SYNDROME CORONAVIRUS 2 (SARS-COV-2) (CORONAVIRUS DISEASE [COVID-19]), AMPLIFIED PROBE TECHNIQUE, MAKING USE OF HIGH THROUGHPUT TECHNOLOGIES AS DESCRIBED BY CMS-2020-01-R: HCPCS | Performed by: INTERNAL MEDICINE

## 2021-04-02 RX ADMIN — BUMETANIDE 4 MG: 2 TABLET ORAL at 08:02

## 2021-04-02 RX ADMIN — HYDRALAZINE HYDROCHLORIDE 75 MG: 50 TABLET ORAL at 19:46

## 2021-04-02 RX ADMIN — DEXTROSE 15 G: 15 GEL ORAL at 10:10

## 2021-04-02 RX ADMIN — CAPSAICIN: 0.75 CREAM TOPICAL at 15:44

## 2021-04-02 RX ADMIN — ASPIRIN 81 MG CHEWABLE TABLET 81 MG: 81 TABLET CHEWABLE at 08:02

## 2021-04-02 RX ADMIN — GABAPENTIN 300 MG: 300 CAPSULE ORAL at 08:03

## 2021-04-02 RX ADMIN — ACETAMINOPHEN 650 MG: 325 TABLET, FILM COATED ORAL at 12:16

## 2021-04-02 RX ADMIN — ACETAMINOPHEN 650 MG: 325 TABLET, FILM COATED ORAL at 22:37

## 2021-04-02 RX ADMIN — WARFARIN SODIUM 9 MG: 4 TABLET ORAL at 18:49

## 2021-04-02 RX ADMIN — FLUTICASONE FUROATE AND VILANTEROL TRIFENATATE 1 PUFF: 100; 25 POWDER RESPIRATORY (INHALATION) at 08:04

## 2021-04-02 RX ADMIN — HYDRALAZINE HYDROCHLORIDE 75 MG: 50 TABLET ORAL at 08:02

## 2021-04-02 RX ADMIN — DOCUSATE SODIUM 50 MG AND SENNOSIDES 8.6 MG 1 TABLET: 8.6; 5 TABLET, FILM COATED ORAL at 19:47

## 2021-04-02 RX ADMIN — POTASSIUM CHLORIDE 40 MEQ: 750 TABLET, EXTENDED RELEASE ORAL at 19:47

## 2021-04-02 RX ADMIN — ANAKINRA 100 MG: 100 INJECTION, SOLUTION SUBCUTANEOUS at 08:15

## 2021-04-02 RX ADMIN — CETIRIZINE HYDROCHLORIDE 10 MG: 10 TABLET, FILM COATED ORAL at 08:03

## 2021-04-02 RX ADMIN — BUPROPION HYDROCHLORIDE 100 MG: 100 TABLET, EXTENDED RELEASE ORAL at 08:03

## 2021-04-02 RX ADMIN — ACARBOSE 25 MG: 25 TABLET ORAL at 08:03

## 2021-04-02 RX ADMIN — ALLOPURINOL 300 MG: 300 TABLET ORAL at 08:02

## 2021-04-02 RX ADMIN — POTASSIUM CHLORIDE 40 MEQ: 750 TABLET, EXTENDED RELEASE ORAL at 08:01

## 2021-04-02 RX ADMIN — METHOCARBAMOL 500 MG: 500 TABLET, FILM COATED ORAL at 22:37

## 2021-04-02 RX ADMIN — ISOSORBIDE DINITRATE 10 MG: 10 TABLET ORAL at 19:47

## 2021-04-02 RX ADMIN — MONTELUKAST 10 MG: 10 TABLET, FILM COATED ORAL at 22:37

## 2021-04-02 RX ADMIN — ACETAMINOPHEN 650 MG: 325 TABLET, FILM COATED ORAL at 18:55

## 2021-04-02 RX ADMIN — ISOSORBIDE DINITRATE 10 MG: 10 TABLET ORAL at 08:02

## 2021-04-02 RX ADMIN — ISOSORBIDE DINITRATE 10 MG: 10 TABLET ORAL at 15:42

## 2021-04-02 RX ADMIN — AMIODARONE HYDROCHLORIDE 200 MG: 200 TABLET ORAL at 08:02

## 2021-04-02 RX ADMIN — BUMETANIDE 4 MG: 2 TABLET ORAL at 15:41

## 2021-04-02 RX ADMIN — UMECLIDINIUM 1 PUFF: 62.5 AEROSOL, POWDER ORAL at 08:04

## 2021-04-02 RX ADMIN — GABAPENTIN 600 MG: 300 CAPSULE ORAL at 22:37

## 2021-04-02 RX ADMIN — BUPROPION HYDROCHLORIDE 100 MG: 100 TABLET, EXTENDED RELEASE ORAL at 19:47

## 2021-04-02 RX ADMIN — CAPSAICIN: 0.75 CREAM TOPICAL at 08:10

## 2021-04-02 RX ADMIN — METHOCARBAMOL 500 MG: 500 TABLET, FILM COATED ORAL at 08:01

## 2021-04-02 RX ADMIN — ACETAMINOPHEN 650 MG: 325 TABLET, FILM COATED ORAL at 08:01

## 2021-04-02 RX ADMIN — GABAPENTIN 600 MG: 300 CAPSULE ORAL at 15:41

## 2021-04-02 RX ADMIN — HYDRALAZINE HYDROCHLORIDE 75 MG: 50 TABLET ORAL at 15:42

## 2021-04-02 ASSESSMENT — MIFFLIN-ST. JEOR: SCORE: 1766.51

## 2021-04-02 ASSESSMENT — ACTIVITIES OF DAILY LIVING (ADL)
ADLS_ACUITY_SCORE: 10

## 2021-04-02 NOTE — PROVIDER NOTIFICATION
DATE:  4/2/2021   TIME OF RECEIPT FROM LAB:  Blood glucose monitor in room  LAB TEST:  Blood glucose   LAB VALUE:  35  RESULTS GIVEN WITH READ-BACK TO (PROVIDER):  Melisa, in the room with patient when blood glucose taken  TIME LAB VALUE REPORTED TO PROVIDER:   1007

## 2021-04-02 NOTE — PROGRESS NOTES
[facility]   Cardiology II Service / Advanced Heart Failure  Daily Progress Note      Patient: Jim Willingham  MRN: [id number]  Admission Date: 2/8/2021  Hospital Day # 53    Assessment and Plan: Jim Willingham is a 65 year old male with history of NICM EF 20% c/b VT s/p CRT-D s/p HMII LVAD 6/19/2017 originally intended as destination therapy 2/2 obesity however with recent weight loss now candidate for transplantation. He is admitted for worsening functional status and renal function concerning for worsening heart failure. He is now listed status 2E for transplant.    Changes today:   - Continue bumex 4 mg PO BID  - Continue current hydralazine with hold parameters  - Will discuss his ongoing hypoglycemia with endocrinology  - Adding capsaicin cream for shoulder pain     Chronic systolic heart failure secondary to NICM s/p HM II LVAD. Echo 1/8/21 at 9400rpm, EF<30%m LVIDd 6.8cm, at least mildly reduced RV function, AoV closed without AI, mild-mod eccentric MR, dilated IVC without collapse. RHC 3/23 RA 10 mPA 25 mPCWP 14 Kee CO/CI 6.5/3.1.  Stage D, NYHA Class III  ACEi/ARB contraindicated due to renal dysfunction. Isordil 10 mg po TID. Hydralazine 75 mg TID   BB Contraindicated due to low cardiac output.   Aldosterone antagonist contraindicated due to renal dysfunction  SCD prophylaxis CRT-D  Fluid status: Near euvolemic Bumex 4 mg po BID  MAP: Goal 65-85, has been above goal  LDH: 309 4/2  Anticoagulation: Coumadin per pharmacy. INR- 2.39, goal 2.5-3, goal increased in setting of power spikes and elevated flows this hospitalization.   Antiplatelet: ASA 81 mg po daily  - remains on the cardiac transplant list status 2E. Persistent escalation of diuretics in setting of progressive heart failure with refractory hypervolemia.   - Encouraged activity.   .  WALTER on CKD Stage III. Cr peaked at 2.22.  - Cr- 1.53 (1.73).     History of Afib. History of VT/VF. Asymptomatic bursts of questionable AF vs  NSVT without hemodynamic compromise. RHC 3/23 RA 10 mPA 25 mPCWP 14 Kee CO/CI 6.5/3.1. CHADSVASC-5.   - Continue Amiodarone 200 mg po daily.   - Coumadin as above.   - Device interrogation 3/29 given increased frequency in arrhythmia, no arrhythmias noted.      DM type II, controlled. Symptomatic Hypoglycemia, resolved. Low Cortisol: Hgb A1C-6.2. Lantus and Novololg sliding scale insulin discontinued. Endo consult appreciated. C-peptide 13.9 an Proinsulin 18.4. Serum cortisol 7.7 3/6/21, cosyntropin stim test WNL per Endo.   -Per endo --> Fingerstick glucose might not be accurate for hypoglycemia, if patient has glucose <55 (without insulin), plasma glucose should be sent for confirmation of hypoglycemia prior to correction. While the patient is off insulin therapy, POC glucose readings above 60 are acceptable for the patient  - Serum glucose confirmation of hypoglycemia of 57 3/29/21  pt symptomatic. Endo notified and recommended dietary modifications. Recurrent despite dietary recommendations, started acarbose 25 mg daily with breakfast on 4/2. Recurrent hypoglycemia to 35, discussed with endo  - Endocrine to discuss today, continue acarbose 25 mg daily for now     Chronic/resolved conditions  COPD/Asthma: pta Breo Ellipta, albuterol prn.   Depression: pta Bupropion  Right sided weakness, resolved. CT head negative for acute findings. Appreciate Neuro eval.   Staph Hominis Bacteremia. No need for surveillance blood cx per transplant ID.    Rhinovirus, resolved. Bacterial bronchitis. Completed 5 day course of Cefdinir. COVID negative 2/8. Resp PCR +rhinovirus. CXR 2/12 with no overt PNA. Stopped cefdinir 2/15. Cleared for transplant per ID. Repeat RVP negative 3/5. COVID repeated due to sinus congestion, negative. Congestion improved with transition from Claritin to Zyrtec.   Carpel tunnel, bilateral s/p bilateral release. Evidence of thenar atrophy. Relief with steroid injection 11/20. Carpal tunnel release  "2/18/21. Appreciate Ortho/Plastics consult, signed off. Continue Tylenol 650 mg po QID and Gabapentin 300 mg in AM, 600 mg in the afternoon, and 600 mg in the evening.      FEN: 3 gram sodium diet   PROPHY:  Coumadin  LINES:  PIV   DISPO:  TBD pending cardiac transplant.   CODE STATUS: Full Code   ================================================================    Interval History/ROS:  No Le edema. He denies fever, chills, chest pain, palpitations, RESENDIZ, cough, nausea, vomiting, diarrhea, melena, hematochezia, and LE edema. Still slight dizziness with position changes, recovers with waiting a few seconds to move on. He is tolerating oral intake and ambulation.     Last 24 hr care team notes reviewed.   ROS:  4 point ROS including Respiratory, CV, GI and , other than that noted in the HPI, is negative.     Medications: Reviewed in EPIC.     Physical Exam:   BP (!) 76/66 (BP Location: Left arm)   Pulse 75   Temp 97.7  F (36.5  C) (Oral)   Resp 18   Ht 1.727 m (5' 8\")   Wt 99.7 kg (219 lb 12.8 oz)   SpO2 99%   BMI 33.42 kg/m    GENERAL: Appears alert and interacting appropriatly.  HEENT: Eye symmetrical and free of discharge bilaterally. Mucous membranes moist and without lesions.  NECK: Supple and without lymphadenopathy. JVD lower 1/3 of neck upright, unchanged from prior exam  CV: RRR, S1S2 present with LVAD hum.   RESPIRATORY: Respirations regular, even, and unlabored. Lungs CTA throughout.   GI: Soft and non distended with normoactive bowel sounds present in all quadrants. No tenderness, rebound, guarding. No organomegaly.   EXTREMITIES: Trace bilateral LE peripheral edema. All extremities are warm and well perfused.  NEUROLOGIC: Alert and interacting appropriatly.  No focal deficits.   MUSCULOSKELETAL: No joint swelling or tenderness.   SKIN: No jaundice. No rashes or lesions. LVAD drive line covered.     Data:  CMP  Recent Labs   Lab 04/02/21  0651 04/01/21  0605 03/31/21  0546 03/30/21  0627 " 03/29/21  0515    136 137 136 137   POTASSIUM 3.9 4.3 4.2 4.2 4.4   CHLORIDE 103 103 103 102 104   CO2 26 28 27 27 28   ANIONGAP 7 5 6 7 5   GLC 82 95 96 88 95   BUN 40* 43* 45* 42* 38*   CR 1.53* 1.73* 1.66* 1.71* 1.56*   GFRESTIMATED 47* 41* 43* 41* 46*   GFRESTBLACK 55* 47* 50* 48* 54*   QAMAR 9.1 8.8 8.7 8.8 8.5   MAG 2.5* 2.6* 2.6* 2.4* 2.6*   PROTTOTAL  --   --   --   --  6.2*   ALBUMIN  --   --   --   --  3.2*   BILITOTAL  --   --   --   --  0.4   ALKPHOS  --   --   --   --  98   AST  --   --   --   --  32   ALT  --   --   --   --  34     CBC  Recent Labs   Lab 04/02/21  0651 03/31/21  0546 03/29/21  0515 03/27/21  0549   WBC 3.7* 3.8* 4.1 4.1   RBC 4.18* 4.06* 3.74* 3.89*   HGB 12.2* 12.1* 11.2* 11.8*   HCT 38.6* 37.9* 36.2* 37.5*   MCV 92 93 97 96   MCH 29.2 29.8 29.9 30.3   MCHC 31.6 31.9 30.9* 31.5   RDW 16.3* 16.3* 16.6* 16.8*    174 174 175     INR  Recent Labs   Lab 04/02/21  0651 04/01/21  0605 03/31/21  0546 03/30/21  0627   INR 2.39* 2.43* 2.46* 2.59*       Patient discussed with Dr. Montgomery.    NAOMI YeboahC  [facility] Cardiology

## 2021-04-02 NOTE — PROGRESS NOTES
"Around 1000 pt called and stated he feels \"like his sugar is low\", critical blood glucose of 35, while provider in room. Provider states to implement hypoglycemia protocol. Pt had two abhishek crackers, 480 ml of orange juice with 4 packets, 240 ml apple juice, one peanut butter cup, and 15g glucose gel. AO*4, pt has hand tremors     1015 - blood glucose 100, BP maps 50-60s, AO*4    1030 -  Blood glucose 130, BP map 67, Pt states he feels \"wasted\". Pt currently supine in bed and resting. Ao*4     Nuris Darling RN   "

## 2021-04-02 NOTE — PROGRESS NOTES
The patient's HeartMate LVAD was interrogated 4/2/2021  * Speed 9600 rpm   * Pulsatility index 4.3   * Power 7.1 Manuel   * Flow 7.1 L/minute   Fluid status: near euvolemic   Alarms were reviewed, and notable for frequent PI events, power and flows 6-7.   The driveline exit site was inspected, c/d/i.   All external components were inspected and showed no evidence of damage or malfunction, none replaced.   No changes to VAD settings made

## 2021-04-02 NOTE — PLAN OF CARE
D: Admitted 2/08 for worsening functional status and renal function concerning for worsening heart failure. Now listed status 2E on heart transplant list.   Hx: COPD, CKD3, DM2, NICM (EF 20%), VT/VF s/p CRT-D s/p LVAD HM2 2017 initially as destination therapy d/t obesity, but pt now eligible for transplant due to weight loss.     I: Monitored vitals and assessed pt status.   PRN: Robaxin, Tylenol, Melatonin  Tele: SR w/ 1st degree AVB  O2: RA, home CPAP at night  Mobility: independent     A: A0x4. VSS. LVAD #'s WNL. No alarms overnight. Flow and power spike up to 7's at times. Denies dizziness. Afebrile. Urinating adequately. LBM 4/1. 3g Na diet. BG checks twice daily and at bedtime. C/o bilateral shoulder pain. Do not disturb orders respected from 3473-2121. Ambulated in halls several times. Able to make needs known.      P: Continue to monitor Pt status and report changes to Cards 2.

## 2021-04-02 NOTE — PROGRESS NOTES
"Endocrine Progress Note:          Assessment/Plan:     Jim Willingham is a 65 year old male with PMHx of non-ischemic cardiomyopathy with EF 20% c/b VT s/p CRT-D, placement of LVAD on 6/19/2017, chronic kidney disease stage III, type II diabetes, atrial fibrillation, COPD, depression, gastric bypass in 2003. Admitted with worsening of functional status, WALTER. on CKD,  with concern for worsening heart failure, he is now a transplant candidate and has refractory hypervolemia.     # Reactive/ postprandial hypoglycemia  # history of type II diabetes  # History of bariatric surgery 2003, 363 lbs down to 220 lbs, then back to 260 lbs, now 222 lbs - total lost 140 lbs    Patient has postprandial hypoglycemia based on his history, diary of symptoms that he is paying attention lately, timing and his self experimentation with mixed meal for breakfast on Friday and Sunday. He experienced an episode of reactive hypoglycemia with glucose dipping down to 47 on the morning of 4/1. On 4/2, BG was 35 after breakfast.     Of note, other causes were worked up in initial consult by our team. \"Last HbA1c was 6.2% on 1/7/21. He has not received insulin glargine since 2/10.Last dose of insulin aspart was on 3/01  C-peptide and proinsulin levels were checked for assessment of hypoglycemia, 13.9 and 18.4 respectively (the patient was normoglycemic with post prandial plasma glucose of 84 at that time- after treatment for hypoglycemia). Adrenal insufficiency ruled out (see below)\"       Plan:  - Continue dietary modification: Eat food with more fibres. Avoid meals with high carbs, eat small frequent meals. Nutrition consult requested to suggest diet choices that are lower carb (about  grams per day)  - Continue acarbose 25mg daily with breakfast. If patient tolerates well, and continues to have hypoglycemia, can increase to 50 mg with breakfast after 1 week.  - If he has low BG, treat with juice/ simple sugar PLUS protein to avoid " "reactive hypoglycemia after treatment.    # Low AM cortisol.  History of prolonged corticosteroid use (recently discontinued)  Had been on Prednisone 20 mg daily for about 7 years (for management of pain secondary to carpal tunnel syndrome), tapered down from Sep 2020 and stopped in Feb 2021    AM cortisol less than 10  cosyntropin stimulation test (250 mcg) done 3/8 showed appropriate stimulation of adrenals.               Baseline cortisol 7.4             Cortisol at 60 min 23.9         The case was discussed with my attending, Dr. Mims.    Laurence Loo MD  PGY-4 Endocrine Fellow  Pager # [phone]     Attending tie-in note  I saw the patient with endocrine fellow Dr. Loo and directly examined patient and discussed. Agree above note and plan.       Jacque Mims MD  Staff Physician  Endocrinology and Metabolism  [facility]  License: [id number]  Pager: [phone]            Interval History:     This morning he ate an english muffin, cream cheese, sausage james, hashbrowns and cheerios with banana. He did take Acarbose shortly before breakfast. However he had a symptomatic low BG 35 after breakfast. It was treated with two abhishek crackers, 480 ml of orange juice (4 packets), 240 ml apple juice, one peanut butter cup, and 15g glucose gel.  1 hour later he went to rehab and after therapy he was down to 56 again.  He ate lunch after that , a PB and jelly sandwich.    No complaints of abdominal pain/flatulence. Tolerating acarbose.    Orders Placed This Encounter      Combination Diet 3 gm NA Diet    ROS:    Review of pertinent systems was negative except as noted above.          Exam:      Blood pressure 94/83, pulse 67, temperature 98.1  F (36.7  C), temperature source Oral, resp. rate 20, height 1.727 m (5' 8\"), weight 99.7 kg (219 lb 12.8 oz), SpO2 98 %.  General: Alert, communicating clearly.   Face: no edema  Resp: no acute distress  Neuro: intact         Data:     Lab Results "   Component Value Date    A1C 6.2 01/07/2021    A1C 5.7 11/05/2020    A1C 7.5 08/05/2020    A1C 7.0 01/21/2020    A1C 6.7 05/24/2017       Recent Labs   Lab 04/02/21  1549 04/02/21  1304 04/02/21  1206 04/02/21  1151 04/02/21  1030 04/02/21  1016 04/02/21  0651 04/02/21  0651 04/01/21  0605 04/01/21  0605 03/31/21  0546 03/31/21  0546 03/30/21  0627 03/30/21  0627 03/29/21  0515 03/29/21  0515 03/28/21  1049 03/28/21  1049   GLC  --   --   --   --   --   --   --  82  --  95  --  96  --  88  --  95  --  57*   BGM 87 126* 111* 56* 130* 100*   < >  --    < >  --    < >  --    < >  --    < >  --    < >  --     < > = values in this interval not displayed.

## 2021-04-03 LAB
ANION GAP SERPL CALCULATED.3IONS-SCNC: 8 MMOL/L (ref 3–14)
BUN SERPL-MCNC: 43 MG/DL (ref 7–30)
CALCIUM SERPL-MCNC: 8.9 MG/DL (ref 8.5–10.1)
CHLORIDE SERPL-SCNC: 101 MMOL/L (ref 94–109)
CO2 SERPL-SCNC: 26 MMOL/L (ref 20–32)
CREAT SERPL-MCNC: 1.47 MG/DL (ref 0.66–1.25)
GFR SERPL CREATININE-BSD FRML MDRD: 50 ML/MIN/{1.73_M2}
GLUCOSE BLDC GLUCOMTR-MCNC: 100 MG/DL (ref 70–99)
GLUCOSE BLDC GLUCOMTR-MCNC: 105 MG/DL (ref 70–99)
GLUCOSE BLDC GLUCOMTR-MCNC: 88 MG/DL (ref 70–99)
GLUCOSE SERPL-MCNC: 78 MG/DL (ref 70–99)
INR PPP: 2.76 (ref 0.86–1.14)
MAGNESIUM SERPL-MCNC: 2.6 MG/DL (ref 1.6–2.3)
POTASSIUM SERPL-SCNC: 4.2 MMOL/L (ref 3.4–5.3)
SODIUM SERPL-SCNC: 134 MMOL/L (ref 133–144)

## 2021-04-03 PROCEDURE — 250N000013 HC RX MED GY IP 250 OP 250 PS 637: Performed by: INTERNAL MEDICINE

## 2021-04-03 PROCEDURE — 250N000013 HC RX MED GY IP 250 OP 250 PS 637: Performed by: STUDENT IN AN ORGANIZED HEALTH CARE EDUCATION/TRAINING PROGRAM

## 2021-04-03 PROCEDURE — 250N000013 HC RX MED GY IP 250 OP 250 PS 637: Performed by: NURSE PRACTITIONER

## 2021-04-03 PROCEDURE — 80048 BASIC METABOLIC PNL TOTAL CA: CPT | Performed by: STUDENT IN AN ORGANIZED HEALTH CARE EDUCATION/TRAINING PROGRAM

## 2021-04-03 PROCEDURE — 999N001017 HC STATISTIC GLUCOSE BY METER IP

## 2021-04-03 PROCEDURE — 250N000009 HC RX 250: Performed by: NURSE PRACTITIONER

## 2021-04-03 PROCEDURE — 93750 INTERROGATION VAD IN PERSON: CPT | Performed by: NURSE PRACTITIONER

## 2021-04-03 PROCEDURE — 214N000001 HC R&B CCU UMMC

## 2021-04-03 PROCEDURE — 36415 COLL VENOUS BLD VENIPUNCTURE: CPT | Performed by: STUDENT IN AN ORGANIZED HEALTH CARE EDUCATION/TRAINING PROGRAM

## 2021-04-03 PROCEDURE — 250N000013 HC RX MED GY IP 250 OP 250 PS 637: Performed by: PHYSICIAN ASSISTANT

## 2021-04-03 PROCEDURE — 99232 SBSQ HOSP IP/OBS MODERATE 35: CPT | Mod: 25 | Performed by: NURSE PRACTITIONER

## 2021-04-03 PROCEDURE — 83735 ASSAY OF MAGNESIUM: CPT | Performed by: STUDENT IN AN ORGANIZED HEALTH CARE EDUCATION/TRAINING PROGRAM

## 2021-04-03 PROCEDURE — 85610 PROTHROMBIN TIME: CPT | Performed by: STUDENT IN AN ORGANIZED HEALTH CARE EDUCATION/TRAINING PROGRAM

## 2021-04-03 RX ORDER — WARFARIN SODIUM 7.5 MG/1
7.5 TABLET ORAL
Status: COMPLETED | OUTPATIENT
Start: 2021-04-03 | End: 2021-04-03

## 2021-04-03 RX ADMIN — CETIRIZINE HYDROCHLORIDE 10 MG: 10 TABLET, FILM COATED ORAL at 07:40

## 2021-04-03 RX ADMIN — BUMETANIDE 4 MG: 2 TABLET ORAL at 13:23

## 2021-04-03 RX ADMIN — GABAPENTIN 300 MG: 300 CAPSULE ORAL at 07:42

## 2021-04-03 RX ADMIN — GABAPENTIN 600 MG: 300 CAPSULE ORAL at 13:23

## 2021-04-03 RX ADMIN — ACARBOSE 25 MG: 25 TABLET ORAL at 06:59

## 2021-04-03 RX ADMIN — POTASSIUM CHLORIDE 40 MEQ: 750 TABLET, EXTENDED RELEASE ORAL at 20:17

## 2021-04-03 RX ADMIN — Medication 10 MG: at 21:46

## 2021-04-03 RX ADMIN — UMECLIDINIUM 1 PUFF: 62.5 AEROSOL, POWDER ORAL at 07:43

## 2021-04-03 RX ADMIN — GABAPENTIN 600 MG: 300 CAPSULE ORAL at 21:44

## 2021-04-03 RX ADMIN — HYDRALAZINE HYDROCHLORIDE 75 MG: 50 TABLET ORAL at 07:41

## 2021-04-03 RX ADMIN — WARFARIN SODIUM 7.5 MG: 7.5 TABLET ORAL at 18:09

## 2021-04-03 RX ADMIN — ASPIRIN 81 MG CHEWABLE TABLET 81 MG: 81 TABLET CHEWABLE at 07:40

## 2021-04-03 RX ADMIN — ISOSORBIDE DINITRATE 10 MG: 10 TABLET ORAL at 13:23

## 2021-04-03 RX ADMIN — ALLOPURINOL 300 MG: 300 TABLET ORAL at 07:41

## 2021-04-03 RX ADMIN — POTASSIUM CHLORIDE 40 MEQ: 750 TABLET, EXTENDED RELEASE ORAL at 07:40

## 2021-04-03 RX ADMIN — HYDRALAZINE HYDROCHLORIDE 75 MG: 50 TABLET ORAL at 13:23

## 2021-04-03 RX ADMIN — ANAKINRA 100 MG: 100 INJECTION, SOLUTION SUBCUTANEOUS at 08:05

## 2021-04-03 RX ADMIN — BUPROPION HYDROCHLORIDE 100 MG: 100 TABLET, EXTENDED RELEASE ORAL at 20:17

## 2021-04-03 RX ADMIN — ACETAMINOPHEN 650 MG: 325 TABLET, FILM COATED ORAL at 07:47

## 2021-04-03 RX ADMIN — ISOSORBIDE DINITRATE 10 MG: 10 TABLET ORAL at 20:18

## 2021-04-03 RX ADMIN — HYDRALAZINE HYDROCHLORIDE 75 MG: 50 TABLET ORAL at 20:17

## 2021-04-03 RX ADMIN — ACETAMINOPHEN 650 MG: 325 TABLET, FILM COATED ORAL at 13:23

## 2021-04-03 RX ADMIN — BUPROPION HYDROCHLORIDE 100 MG: 100 TABLET, EXTENDED RELEASE ORAL at 07:41

## 2021-04-03 RX ADMIN — DOCUSATE SODIUM 50 MG AND SENNOSIDES 8.6 MG 1 TABLET: 8.6; 5 TABLET, FILM COATED ORAL at 20:18

## 2021-04-03 RX ADMIN — BUMETANIDE 4 MG: 2 TABLET ORAL at 07:41

## 2021-04-03 RX ADMIN — FLUTICASONE FUROATE AND VILANTEROL TRIFENATATE 1 PUFF: 100; 25 POWDER RESPIRATORY (INHALATION) at 07:43

## 2021-04-03 RX ADMIN — AMIODARONE HYDROCHLORIDE 200 MG: 200 TABLET ORAL at 07:42

## 2021-04-03 RX ADMIN — ISOSORBIDE DINITRATE 10 MG: 10 TABLET ORAL at 07:41

## 2021-04-03 RX ADMIN — MONTELUKAST 10 MG: 10 TABLET, FILM COATED ORAL at 21:44

## 2021-04-03 ASSESSMENT — ACTIVITIES OF DAILY LIVING (ADL)
ADLS_ACUITY_SCORE: 10

## 2021-04-03 ASSESSMENT — MIFFLIN-ST. JEOR: SCORE: 1776.03

## 2021-04-03 NOTE — PLAN OF CARE
Presents for worsening dyspnea on exertion, lethargy. Decompensated heart failure. Currently here for heart txp waitlist status 2E. Pmhx:  NICM EF 20% c/b VT s/p CRT-D s/p HMII LVAD 2017 originally intended as destination therapy 2/2 obesity, recent weight loss, afib s/p DCCV on amiodarone and warfarin, staph hominis bacteremia, CKD3, COPD    Code status: Full     Team: cards 2    Neuro: ao*4  Cardiac/Tele:  SR w/ 1*AVB w/ BBB, LVAD #'s wnl  Respiratory: room air  GI/: urinating via urinal/toliet  Diet/Appetite: 3g na   Skin: left knee abrasion open to air, LVAD dressing CDI   Endocrine: ACHS  LDAs: R PIV SL  Activity: up ad francisco  Pain: Shoulder pain    PRN tylenol given x 2    Nuris Darling, RN  Time cared for 0700 - 1530

## 2021-04-03 NOTE — PLAN OF CARE
D: Admitted 2/8 for worsening renal function and worsening heart failure. Is now listed as status 2E for heart transplant. Hx of NICM EF 20% c/b VT s/p CRT-D s/p HMII LVAD 2017 originally intended as destination therapy 2/2 obesity, recent weight loss, afib s/p DCCV on amiodarone and warfarin, staph hominis bacteremia, CKD3, COPD    I: Monitored vitals, LVAD #s, BGs, and completed assessments. LVAD dressing change was completed.  PRN:  Tylenol 650mg @0801, 1216, 1855 for bilateral shoulder pain  Capsaicin cream @0810, 1544 for bilateral shoulder pain    A: Afebrile. VSS on RA. SR with 1 degree AV block and BBB. HM2 LVAD. LVAD #s were good except flows were a little high (7.1-7.0).  3g Na+ diet. Pt had two hypoglycemic episodes today. Pt had endocrine consult today. COVID swab collected today and pt is COVID negative. Pt has bilateral shoulder pain. Last BG was 132.    P: Continue to monitor and report changes/questions/concerns to CARDS 2.

## 2021-04-03 NOTE — PLAN OF CARE
Admitted 2/8 for SOB and worsening heart failure. Now listed status 2E for heart transplant.      Neuro: A&Ox4. Poor sleep overnight.  Cardiac: First degree AV block with BBB. VSS. HM 2 numbers with intermittent high flows. Other parameters WNL.  Respiratory: Sating 90s on RA. Uses home CPAP.   GI/: Voiding adequately. No BM this shift.  Diet/appetite: On 3 gram Na diet. Good appetite.  Activity:  Up independently.  Pain: Tylenol and Robaxin given x1 with relief..  Skin: no new deficits noted.  LDA's: PIV saline locked.     Plan: Continue with POC. Notify primary team with changes.

## 2021-04-03 NOTE — PROCEDURES
The patient's HeartMate LVAD was interrogated 4/3/2021  * Speed 9600 rpm   * Pulsatility index 3.4-4.1   * Power 6.6-7.5 Manuel   * Flow 5.6-7.8 L/minute   Fluid status: euvolemic   Alarms were reviewed, with no LVAD alarms or signs of pump malfunction.   The driveline exit site was covered, with dressing c/d/i.   All external components were inspected and showed no evidence of damage or malfunction, none replaced.   No changes to VAD settings made.        Shelia Means DNP, FNP-BC, CHFN  Advanced Heart Failure Nurse Practitioner  [facility]

## 2021-04-03 NOTE — PROGRESS NOTES
[facility]   Cardiology II Service / Advanced Heart Failure  Daily Progress Note      Patient: Jim Willingham  MRN: [id number]  Admission Date: 2/8/2021  Hospital Day # 54    Assessment and Plan: Jim Willingham is a 65 year old male with history of NICM EF 20% c/b VT s/p CRT-D s/p HMII LVAD 6/19/2017 originally intended as destination therapy 2/2 obesity however with recent weight loss now candidate for transplantation. He is admitted for worsening functional status and renal function concerning for worsening heart failure. He is now listed status 2E for transplant.    Changes today:    - No changes in plan of care for today  - Continue to manage hypoglycemia per endocrinology       Chronic systolic heart failure secondary to NICM s/p HM II LVAD. Echo 1/8/21 at 9400rpm, EF<30%m LVIDd 6.8cm, at least mildly reduced RV function, AoV closed without AI, mild-mod eccentric MR, dilated IVC without collapse. RHC 3/23 RA 10 mPA 25 mPCWP 14 Kee CO/CI 6.5/3.1.  Stage D, NYHA Class III  ACEi/ARB contraindicated due to renal dysfunction. Isordil 10 mg po TID. Hydralazine 75 mg TID   BB Contraindicated due to low cardiac output.   Aldosterone antagonist contraindicated due to renal dysfunction  SCD prophylaxis CRT-D  Fluid status: Near euvolemic Bumex 4 mg po BID  MAP: Goal 65-85, has been above goal  LDH: 309 4/2   Anticoagulation: Coumadin per pharmacy. INR- 2.76, goal 2.5-3, goal increased in setting of power spikes and elevated flows this hospitalization.   Antiplatelet: ASA 81 mg po daily  - Remains on the cardiac transplant list status 2E. Persistent escalation of diuretics in setting of progressive heart failure with refractory hypervolemia.   - Encouraged activity.   .  WALTER on CKD Stage III. Cr peaked at 2.22.  - Cr- 1.47 (1.53).     History of Afib. History of VT/VF. Asymptomatic bursts of questionable AF vs NSVT without hemodynamic compromise. RHC 3/23 RA 10 mPA 25 mPCWP 14 Kee CO/CI 6.5/3.1.  CHADSVASC-5.   - Continue Amiodarone 200 mg po daily.   - Coumadin as above.   - Device interrogation 3/29 given increased frequency in arrhythmia, no arrhythmias noted.      DM type II, controlled. Symptomatic Hypoglycemia, resolved. Low Cortisol: Hgb A1C-6.2. Lantus and Novololg sliding scale insulin discontinued. Endo consult appreciated. C-peptide 13.9 an Proinsulin 18.4. Serum cortisol 7.7 3/6/21, cosyntropin stim test WNL per Endo.   -Per endo --> Fingerstick glucose might not be accurate for hypoglycemia, if patient has glucose <55 (without insulin), plasma glucose should be sent for confirmation of hypoglycemia prior to correction. While the patient is off insulin therapy, POC glucose readings above 60 are acceptable for the patient. Serum glucose confirmation of hypoglycemia of 57 3/29/21, pt symptomatic. Endo notified and recommended dietary modifications. Recurrent despite dietary recommendations, started acarbose 25 mg daily with breakfast on 4/2. Recurrent hypoglycemia to 35, discussed with endo  - Continue acarbose 25 mg daily.  - Q4 hour blood glucose checks     Chronic/resolved conditions  COPD/Asthma: pta Breo Ellipta, albuterol prn.   Depression: pta Bupropion  Right sided weakness, resolved. CT head negative for acute findings. Appreciate Neuro eval.   Staph Hominis Bacteremia. No need for surveillance blood cx per transplant ID.    Rhinovirus, resolved. Bacterial bronchitis. Completed 5 day course of Cefdinir. COVID negative 2/8. Resp PCR +rhinovirus. CXR 2/12 with no overt PNA. Stopped cefdinir 2/15. Cleared for transplant per ID. Repeat RVP negative 3/5. COVID repeated due to sinus congestion, negative. Congestion improved with transition from Claritin to Zyrtec.   Carpel tunnel, bilateral s/p bilateral release. Evidence of thenar atrophy. Relief with steroid injection 11/20. Carpal tunnel release 2/18/21. Appreciate Ortho/Plastics consult, signed off. Continue Tylenol 650 mg po  "QID and Gabapentin 300 mg in AM, 600 mg in the afternoon, and 600 mg in the evening.      FEN: 3 gram sodium diet   PROPHY:  Coumadin  LINES:  PIV   DISPO:  TBD pending cardiac transplant.   CODE STATUS: Full Code   ================================================================    Interval History/ROS:  No acute events overnight. Feeling well overall today. Endorses some abdominal bloating that started yesterday and he is unsure if this is due to starting Precose (Acarbose) or if it is fluid retention, although his weight has been quite stable. Good appetite, good urine output. Tolerating ambulation in halls. Denies fevers/chills, cough, sore throat, CP, palpitations, SOB, RESENDIZ, dizziness/lightheadedness, headaches, vision changes, n/v/d, constipation, signs of bleeding.      Last 24 hr care team notes reviewed.   ROS:  4 point ROS including Respiratory, CV, GI and , other than that noted in the HPI, is negative.     Medications: Reviewed in EPIC.     Physical Exam:   /63 (BP Location: Left arm)   Pulse 71   Temp 98.1  F (36.7  C) (Oral)   Resp 18   Ht 1.727 m (5' 8\")   Wt 100.7 kg (221 lb 14.4 oz)   SpO2 100%   BMI 33.74 kg/m    GENERAL: Alert, oriented, in no distress, mentation appropriate.   HEENT: Eye symmetrical and free of discharge bilaterally. Mucous membranes moist and without lesions.  NECK: Supple and without lymphadenopathy. JVD not detected when patient is 90 degrees upright.   CV: RRR, S1S2 present with LVAD hum.   RESPIRATORY: Respirations regular, even, and unlabored. Lungs CTA throughout.   GI: Soft and non distended with normoactive bowel sounds present in all quadrants. No tenderness, rebound, guarding. No organomegaly.   EXTREMITIES: Trace bilateral LE peripheral edema. All extremities are warm and well perfused.  NEUROLOGIC: Alert and interacting appropriatly.  No focal deficits.   MUSCULOSKELETAL: No joint swelling or tenderness.   SKIN: No jaundice. No rashes or lesions. LVAD " drive line covered.     Data:  CMP  Recent Labs   Lab 04/03/21  0554 04/02/21  0651 04/01/21  0605 03/31/21  0546 03/29/21  0515 03/29/21  0515    136 136 137   < > 137   POTASSIUM 4.2 3.9 4.3 4.2   < > 4.4   CHLORIDE 101 103 103 103   < > 104   CO2 26 26 28 27   < > 28   ANIONGAP 8 7 5 6   < > 5   GLC 78 82 95 96   < > 95   BUN 43* 40* 43* 45*   < > 38*   CR 1.47* 1.53* 1.73* 1.66*   < > 1.56*   GFRESTIMATED 50* 47* 41* 43*   < > 46*   GFRESTBLACK 58* 55* 47* 50*   < > 54*   QAMAR 8.9 9.1 8.8 8.7   < > 8.5   MAG 2.6* 2.5* 2.6* 2.6*   < > 2.6*   PROTTOTAL  --   --   --   --   --  6.2*   ALBUMIN  --   --   --   --   --  3.2*   BILITOTAL  --   --   --   --   --  0.4   ALKPHOS  --   --   --   --   --  98   AST  --   --   --   --   --  32   ALT  --   --   --   --   --  34    < > = values in this interval not displayed.     CBC  Recent Labs   Lab 04/02/21  0651 03/31/21  0546 03/29/21  0515   WBC 3.7* 3.8* 4.1   RBC 4.18* 4.06* 3.74*   HGB 12.2* 12.1* 11.2*   HCT 38.6* 37.9* 36.2*   MCV 92 93 97   MCH 29.2 29.8 29.9   MCHC 31.6 31.9 30.9*   RDW 16.3* 16.3* 16.6*    174 174     INR  Recent Labs   Lab 04/03/21  0554 04/02/21  0651 04/01/21  0605 03/31/21  0546   INR 2.76* 2.39* 2.43* 2.46*           Belkys Johansen, RN-BSN, DNP-Student, Cardiology II Service         The patient was seen and evaluated with Belkys Johansen DNP student.  I agree with the information noted above.      Shelia Means DNP, FNP-BC, CHFN  Advanced Heart Failure Nurse Practitioner  [facility]

## 2021-04-04 LAB
ANION GAP SERPL CALCULATED.3IONS-SCNC: 8 MMOL/L (ref 3–14)
BUN SERPL-MCNC: 46 MG/DL (ref 7–30)
CALCIUM SERPL-MCNC: 8.7 MG/DL (ref 8.5–10.1)
CHLORIDE SERPL-SCNC: 102 MMOL/L (ref 94–109)
CO2 SERPL-SCNC: 25 MMOL/L (ref 20–32)
CREAT SERPL-MCNC: 1.79 MG/DL (ref 0.66–1.25)
GFR SERPL CREATININE-BSD FRML MDRD: 39 ML/MIN/{1.73_M2}
GLUCOSE BLDC GLUCOMTR-MCNC: 126 MG/DL (ref 70–99)
GLUCOSE BLDC GLUCOMTR-MCNC: 157 MG/DL (ref 70–99)
GLUCOSE BLDC GLUCOMTR-MCNC: 163 MG/DL (ref 70–99)
GLUCOSE SERPL-MCNC: 92 MG/DL (ref 70–99)
INR PPP: 2.94 (ref 0.86–1.14)
MAGNESIUM SERPL-MCNC: 2.6 MG/DL (ref 1.6–2.3)
POTASSIUM SERPL-SCNC: 4.1 MMOL/L (ref 3.4–5.3)
SODIUM SERPL-SCNC: 135 MMOL/L (ref 133–144)

## 2021-04-04 PROCEDURE — 214N000001 HC R&B CCU UMMC

## 2021-04-04 PROCEDURE — 250N000013 HC RX MED GY IP 250 OP 250 PS 637: Performed by: NURSE PRACTITIONER

## 2021-04-04 PROCEDURE — 250N000013 HC RX MED GY IP 250 OP 250 PS 637: Performed by: INTERNAL MEDICINE

## 2021-04-04 PROCEDURE — 250N000013 HC RX MED GY IP 250 OP 250 PS 637: Performed by: STUDENT IN AN ORGANIZED HEALTH CARE EDUCATION/TRAINING PROGRAM

## 2021-04-04 PROCEDURE — 93750 INTERROGATION VAD IN PERSON: CPT | Performed by: NURSE PRACTITIONER

## 2021-04-04 PROCEDURE — 99232 SBSQ HOSP IP/OBS MODERATE 35: CPT | Mod: 25 | Performed by: NURSE PRACTITIONER

## 2021-04-04 PROCEDURE — 250N000013 HC RX MED GY IP 250 OP 250 PS 637: Performed by: PHYSICIAN ASSISTANT

## 2021-04-04 PROCEDURE — 85610 PROTHROMBIN TIME: CPT | Performed by: STUDENT IN AN ORGANIZED HEALTH CARE EDUCATION/TRAINING PROGRAM

## 2021-04-04 PROCEDURE — 83735 ASSAY OF MAGNESIUM: CPT | Performed by: STUDENT IN AN ORGANIZED HEALTH CARE EDUCATION/TRAINING PROGRAM

## 2021-04-04 PROCEDURE — 250N000009 HC RX 250: Performed by: NURSE PRACTITIONER

## 2021-04-04 PROCEDURE — 80048 BASIC METABOLIC PNL TOTAL CA: CPT | Performed by: STUDENT IN AN ORGANIZED HEALTH CARE EDUCATION/TRAINING PROGRAM

## 2021-04-04 PROCEDURE — 999N001017 HC STATISTIC GLUCOSE BY METER IP

## 2021-04-04 PROCEDURE — 36415 COLL VENOUS BLD VENIPUNCTURE: CPT | Performed by: STUDENT IN AN ORGANIZED HEALTH CARE EDUCATION/TRAINING PROGRAM

## 2021-04-04 RX ORDER — WARFARIN SODIUM 5 MG/1
5 TABLET ORAL
Status: COMPLETED | OUTPATIENT
Start: 2021-04-04 | End: 2021-04-04

## 2021-04-04 RX ADMIN — CAPSAICIN: 0.75 CREAM TOPICAL at 11:13

## 2021-04-04 RX ADMIN — HYDRALAZINE HYDROCHLORIDE 75 MG: 50 TABLET ORAL at 07:47

## 2021-04-04 RX ADMIN — ANAKINRA 100 MG: 100 INJECTION, SOLUTION SUBCUTANEOUS at 07:57

## 2021-04-04 RX ADMIN — POTASSIUM CHLORIDE 40 MEQ: 750 TABLET, EXTENDED RELEASE ORAL at 20:02

## 2021-04-04 RX ADMIN — AMIODARONE HYDROCHLORIDE 200 MG: 200 TABLET ORAL at 07:47

## 2021-04-04 RX ADMIN — BUMETANIDE 4 MG: 2 TABLET ORAL at 13:52

## 2021-04-04 RX ADMIN — GABAPENTIN 600 MG: 300 CAPSULE ORAL at 13:52

## 2021-04-04 RX ADMIN — HYDRALAZINE HYDROCHLORIDE 75 MG: 50 TABLET ORAL at 13:53

## 2021-04-04 RX ADMIN — ISOSORBIDE DINITRATE 10 MG: 10 TABLET ORAL at 13:53

## 2021-04-04 RX ADMIN — METHOCARBAMOL 500 MG: 500 TABLET, FILM COATED ORAL at 00:27

## 2021-04-04 RX ADMIN — METHOCARBAMOL 500 MG: 500 TABLET, FILM COATED ORAL at 07:06

## 2021-04-04 RX ADMIN — ISOSORBIDE DINITRATE 10 MG: 10 TABLET ORAL at 07:48

## 2021-04-04 RX ADMIN — ACETAMINOPHEN 650 MG: 325 TABLET, FILM COATED ORAL at 13:53

## 2021-04-04 RX ADMIN — WARFARIN SODIUM 5 MG: 5 TABLET ORAL at 18:18

## 2021-04-04 RX ADMIN — DOCUSATE SODIUM 50 MG AND SENNOSIDES 8.6 MG 1 TABLET: 8.6; 5 TABLET, FILM COATED ORAL at 20:02

## 2021-04-04 RX ADMIN — MONTELUKAST 10 MG: 10 TABLET, FILM COATED ORAL at 22:08

## 2021-04-04 RX ADMIN — ASPIRIN 81 MG CHEWABLE TABLET 81 MG: 81 TABLET CHEWABLE at 07:46

## 2021-04-04 RX ADMIN — METHOCARBAMOL 500 MG: 500 TABLET, FILM COATED ORAL at 13:53

## 2021-04-04 RX ADMIN — FLUTICASONE FUROATE AND VILANTEROL TRIFENATATE 1 PUFF: 100; 25 POWDER RESPIRATORY (INHALATION) at 07:56

## 2021-04-04 RX ADMIN — UMECLIDINIUM 1 PUFF: 62.5 AEROSOL, POWDER ORAL at 07:56

## 2021-04-04 RX ADMIN — BUPROPION HYDROCHLORIDE 100 MG: 100 TABLET, EXTENDED RELEASE ORAL at 20:02

## 2021-04-04 RX ADMIN — ISOSORBIDE DINITRATE 10 MG: 10 TABLET ORAL at 20:02

## 2021-04-04 RX ADMIN — Medication 10 MG: at 22:08

## 2021-04-04 RX ADMIN — BUMETANIDE 4 MG: 2 TABLET ORAL at 07:46

## 2021-04-04 RX ADMIN — POTASSIUM CHLORIDE 40 MEQ: 750 TABLET, EXTENDED RELEASE ORAL at 07:46

## 2021-04-04 RX ADMIN — CETIRIZINE HYDROCHLORIDE 10 MG: 10 TABLET, FILM COATED ORAL at 07:48

## 2021-04-04 RX ADMIN — ACARBOSE 25 MG: 25 TABLET ORAL at 07:05

## 2021-04-04 RX ADMIN — ACETAMINOPHEN 650 MG: 325 TABLET, FILM COATED ORAL at 22:09

## 2021-04-04 RX ADMIN — HYDRALAZINE HYDROCHLORIDE 75 MG: 50 TABLET ORAL at 20:02

## 2021-04-04 RX ADMIN — METHOCARBAMOL 500 MG: 500 TABLET, FILM COATED ORAL at 22:08

## 2021-04-04 RX ADMIN — CAPSAICIN: 0.75 CREAM TOPICAL at 00:29

## 2021-04-04 RX ADMIN — ALLOPURINOL 300 MG: 300 TABLET ORAL at 07:46

## 2021-04-04 RX ADMIN — CAPSAICIN: 0.75 CREAM TOPICAL at 22:10

## 2021-04-04 RX ADMIN — GABAPENTIN 600 MG: 300 CAPSULE ORAL at 22:08

## 2021-04-04 RX ADMIN — CHLOROTHIAZIDE 250 MG: 250 SUSPENSION ORAL at 13:53

## 2021-04-04 RX ADMIN — BUPROPION HYDROCHLORIDE 100 MG: 100 TABLET, EXTENDED RELEASE ORAL at 07:46

## 2021-04-04 RX ADMIN — ACETAMINOPHEN 650 MG: 325 TABLET, FILM COATED ORAL at 07:06

## 2021-04-04 RX ADMIN — ACETAMINOPHEN 650 MG: 325 TABLET, FILM COATED ORAL at 00:27

## 2021-04-04 RX ADMIN — GABAPENTIN 300 MG: 300 CAPSULE ORAL at 07:48

## 2021-04-04 ASSESSMENT — ACTIVITIES OF DAILY LIVING (ADL)
ADLS_ACUITY_SCORE: 10

## 2021-04-04 ASSESSMENT — MIFFLIN-ST. JEOR: SCORE: 1785.56

## 2021-04-04 NOTE — PROCEDURES
The patient's HeartMate LVAD was interrogated 4/4/2021  * Speed 9600 rpm   * Pulsatility index 4.2-5.5   * Power 6.5-7.2 Manuel   * Flow 5.9-7.4 L/minute   Fluid status: euvolemic/slightly hypervolemic   Alarms were reviewed, with no LVAD alarms or signs of pump malfunction.   The driveline exit site was covered, with dressing c/d/i.   All external components were inspected and showed no evidence of damage or malfunction, none replaced.   No changes to VAD settings made.        Shelia Means DNP, FNP-BC, CHFN  Advanced Heart Failure Nurse Practitioner  [facility]

## 2021-04-04 NOTE — PLAN OF CARE
Shift summary [id number]    D:Pt awaiting heart transplant.  A/I:Pt alert and oriented pleasant man. Pt has been in SR with a 1st degree and BBB, other VSS. Pt up independently but with noticeable SOB. Pt sating well on RA. Pt has HM @, flows in the 6-7, PI 3-4 and power in the 7's. Dressing changed this evening, site very CDI.Pt eating,drinking and voiding. Pt's FSBG WNL. Pt c/o right shoulder pain, tolerable with tylenol and Robaxin.  P: Continue to await heart transplant.

## 2021-04-04 NOTE — PLAN OF CARE
D: Admitted s/p HF decompensation, status 2E transplant   PMH of NICM EF 20% c/b VT s/p CRT-D s/p HM2 LVAD 6/19/2017, CKD Stage III, COPD, depression     I: Monitored vitals and assessed pt status.     Changed: LVAD Dressing  PRN: Tylenol, Robaxin, Capsaicin cream     A: A0x4. Afebrile. VSS. HRs 70s. Sinus rhythm w/1st degree AV Block. Sats >92% on RA. Pt denies any shortness of breath at rest, chest pain/palpitations, nausea, dizziness, or chills. Dyspnea on exertion. Tylenol, Robaxin and Capsaicin cream given for shoulder pain w/relief. HM2 LVAD. Dressing changed. Numbers WNL. No flow alarms this shift. BGs q4hrs. Pt on 3g Na+ diet. Pt up independently.      P: Continue to monitor pt status and notify Cards 2 treatment team with any changes or concerns.

## 2021-04-04 NOTE — PLAN OF CARE
D: Pt with h/y of NICM,EF 20%, h/y of VT,CRT-D. HM 2 (6/19/17) originally intended as destination therapy 2/2 obesity. With recent weight loss now candidate for transplantation. He is admitted for worsening functional status and renal function concerning for worsening heart failure. He is now listed status 2E for transplant per MD note.        I: Monitored vitals and assessed pt status.   PRN: Robaxin, Tylenol, capsaicin cream    A: A0x4. VSS, on RA. SR, AV 1d. Afebrile.Do not disturb orders respected from [id number]. Shoulder pain (R and L ),controled with Tylenol,Robaxin and capsaicin cream with intermittent effect. AUO, dressings CDI. LVAD #s WNl, NO ALARMS OVERNIGHT.        Temp:  [97.8  F (36.6  C)-98.1  F (36.7  C)] 98  F (36.7  C)  Pulse:  [67-98] 69  Resp:  [16-18] 16  BP: ()/(57-86) 97/86  SpO2:  [97 %-100 %] 98 %      P: Continue to monitor Pt status and report changes to treatment team.

## 2021-04-04 NOTE — PROGRESS NOTES
[facility]   Cardiology II Service / Advanced Heart Failure  Daily Progress Note      Patient: Jim Willingham  MRN: [id number]  Admission Date: 2/8/2021  Hospital Day # 55    Assessment and Plan: Jim Willingham is a 65 year old male with history of NICM EF 20% c/b VT s/p CRT-D s/p HMII LVAD 6/19/2017 originally intended as destination therapy 2/2 obesity however with recent weight loss now candidate for transplantation. He is admitted for worsening functional status and renal function concerning for worsening heart failure. He is now listed status 2E for transplant.    Changes today:    - OTD Diuril 250 mg po x 1 today     Chronic systolic heart failure secondary to NICM s/p HM II LVAD. Echo 1/8/21 at 9400rpm, EF<30%m LVIDd 6.8cm, at least mildly reduced RV function, AoV closed without AI, mild-mod eccentric MR, dilated IVC without collapse. RHC 3/23 RA 10 mPA 25 mPCWP 14 Kee CO/CI 6.5/3.1.  Stage D, NYHA Class III  ACEi/ARB contraindicated due to renal dysfunction. Isordil 10 mg po TID. Hydralazine 75 mg TID   BB Contraindicated due to low cardiac output.   Aldosterone antagonist contraindicated due to renal dysfunction  SCD prophylaxis CRT-D  Fluid status: Near euvolemic Bumex 4 mg po BID  MAP: Goal 65-85, has been  65-97 in the past 24 hours.   LDH: 309 4/2   Anticoagulation: Coumadin per pharmacy. INR- 2.94, goal 2.5-3, goal increased in setting of power spikes and elevated flows this hospitalization.   Antiplatelet: ASA 81 mg po daily  - Remains on the cardiac transplant list status 2E. Persistent escalation of diuretics in setting of progressive heart failure with refractory hypervolemia.   - Encouraged activity.   .  WALTER on CKD Stage III. Cr peaked at 2.22.  - Cr- 1.79 (1.53).  - Trend BMP daily.      History of Afib. History of VT/VF. Asymptomatic bursts of questionable AF vs NSVT without hemodynamic compromise. RHC 3/23 RA 10 mPA 25 mPCWP 14 Kee CO/CI 6.5/3.1. CHADSVASC-5.   - Continue  Amiodarone 200 mg po daily.   - Coumadin as above.   - Device interrogation 3/29 given increased frequency in arrhythmia, no arrhythmias noted.      DM type II, controlled. Symptomatic Hypoglycemia, resolved. Low Cortisol: Hgb A1C-6.2. Lantus and Novololg sliding scale insulin discontinued. Endo consult appreciated. C-peptide 13.9 an Proinsulin 18.4. Serum cortisol 7.7 3/6/21, cosyntropin stim test WNL per Endo.   -Per endo --> Fingerstick glucose might not be accurate for hypoglycemia, if patient has glucose <55 (without insulin), plasma glucose should be sent for confirmation of hypoglycemia prior to correction. While the patient is off insulin therapy, POC glucose readings above 60 are acceptable for the patient. Serum glucose confirmation of hypoglycemia of 57 3/29/21, pt symptomatic. Endo notified and recommended dietary modifications. Recurrent despite dietary recommendations, started acarbose 25 mg daily with breakfast on 4/2. Recurrent hypoglycemia to 35, discussed with endo  - No episodes of hypoglycemia in the past 24 hours.   - Continue acarbose 25 mg daily.  - Q4 hour blood glucose checks     Chronic/resolved conditions  COPD/Asthma: pta Breo Ellipta, albuterol prn.   Depression: pta Bupropion  Right sided weakness, resolved. CT head negative for acute findings. Appreciate Neuro eval.   Staph Hominis Bacteremia. No need for surveillance blood cx per transplant ID.    Rhinovirus, resolved. Bacterial bronchitis. Completed 5 day course of Cefdinir. COVID negative 2/8. Resp PCR +rhinovirus. CXR 2/12 with no overt PNA. Stopped cefdinir 2/15. Cleared for transplant per ID. Repeat RVP negative 3/5. COVID repeated due to sinus congestion, negative. Congestion improved with transition from Claritin to Zyrtec.   Carpel tunnel, bilateral s/p bilateral release. Evidence of thenar atrophy. Relief with steroid injection 11/20. Carpal tunnel release 2/18/21. Appreciate Ortho/Plastics consult, signed off.  "Continue Tylenol 650 mg po QID and Gabapentin 300 mg in AM, 600 mg in the afternoon, and 600 mg in the evening.      FEN: 3 gram sodium diet   PROPHY:  Coumadin  LINES:  PIV   DISPO:  TBD pending cardiac transplant.   CODE STATUS: Full Code   ================================================================    Interval History/ROS:  No acute events overnight. Feeling well today except for pain in bilateral shoulders. Continueing to report abdominal bloating that started 4/2, unsure if this is due to starting Precose (Acarbose) or ifluid retention, although he does not feel more SOB. Good appetite, adequate urine output. Tolerating ambulation in halls. Denies fevers/chills, cough, sore throat, CP, palpitations, SOB, RESENDIZ, dizziness/lightheadedness, headaches, vision changes, n/v/d, constipation, signs of bleeding.     Last 24 hr care team notes reviewed.   ROS:  4 point ROS including Respiratory, CV, GI and , other than that noted in the HPI, is negative.     Medications: Reviewed in EPIC.     Physical Exam:   BP 97/86   Pulse 70   Temp 98  F (36.7  C) (Oral)   Resp 16   Ht 1.727 m (5' 8\")   Wt 101.6 kg (224 lb)   SpO2 98%   BMI 34.06 kg/m    GENERAL: Alert, oriented, in no distress, mentation appropriate.   HEENT: Eye symmetrical and free of discharge bilaterally. Mucous membranes moist and without lesions.  NECK: Supple and without lymphadenopathy. JVD undetectable when patient is upright at 90 degrees.    CV: RRR, S1S2 present with LVAD hum.   RESPIRATORY: Respirations regular, even, and unlabored. Lungs CTA throughout.   GI: Soft and non distended with normoactive bowel sounds present in all quadrants. No tenderness, rebound, guarding. No organomegaly.   EXTREMITIES: Trace bilateral LE peripheral edema. All extremities are warm and well perfused.  NEUROLOGIC: Alert and interacting appropriatly.  No focal deficits.   MUSCULOSKELETAL: No joint swelling or tenderness.   SKIN: No jaundice. No rashes or " lesions. LVAD drive line covered.     Data:  CMP  Recent Labs   Lab 04/04/21  0556 04/03/21  0554 04/02/21  0651 04/01/21  0605 03/29/21  0515 03/29/21  0515    134 136 136   < > 137   POTASSIUM 4.1 4.2 3.9 4.3   < > 4.4   CHLORIDE 102 101 103 103   < > 104   CO2 25 26 26 28   < > 28   ANIONGAP 8 8 7 5   < > 5   GLC 92 78 82 95   < > 95   BUN 46* 43* 40* 43*   < > 38*   CR 1.79* 1.47* 1.53* 1.73*   < > 1.56*   GFRESTIMATED 39* 50* 47* 41*   < > 46*   GFRESTBLACK 45* 58* 55* 47*   < > 54*   QAMAR 8.7 8.9 9.1 8.8   < > 8.5   MAG 2.6* 2.6* 2.5* 2.6*   < > 2.6*   PROTTOTAL  --   --   --   --   --  6.2*   ALBUMIN  --   --   --   --   --  3.2*   BILITOTAL  --   --   --   --   --  0.4   ALKPHOS  --   --   --   --   --  98   AST  --   --   --   --   --  32   ALT  --   --   --   --   --  34    < > = values in this interval not displayed.     CBC  Recent Labs   Lab 04/02/21  0651 03/31/21  0546 03/29/21  0515   WBC 3.7* 3.8* 4.1   RBC 4.18* 4.06* 3.74*   HGB 12.2* 12.1* 11.2*   HCT 38.6* 37.9* 36.2*   MCV 92 93 97   MCH 29.2 29.8 29.9   MCHC 31.6 31.9 30.9*   RDW 16.3* 16.3* 16.6*    174 174     INR  Recent Labs   Lab 04/04/21  0556 04/03/21  0554 04/02/21  0651 04/01/21  0605   INR 2.94* 2.76* 2.39* 2.43*           Belkys Johansen RN-BSN, DNP-Student        The patient was seen and evaluated with Belkys Johansen DNP student.  I agree with the information noted above.      Shelia Means DNP, FNP-BC, CHFN  Advanced Heart Failure Nurse Practitioner  [facility]

## 2021-04-05 LAB
ALBUMIN SERPL-MCNC: 3.6 G/DL (ref 3.4–5)
ALP SERPL-CCNC: 108 U/L (ref 40–150)
ALT SERPL W P-5'-P-CCNC: 40 U/L (ref 0–70)
ANION GAP SERPL CALCULATED.3IONS-SCNC: 6 MMOL/L (ref 3–14)
AST SERPL W P-5'-P-CCNC: 35 U/L (ref 0–45)
BILIRUB DIRECT SERPL-MCNC: 0.1 MG/DL (ref 0–0.2)
BILIRUB SERPL-MCNC: 0.5 MG/DL (ref 0.2–1.3)
BUN SERPL-MCNC: 44 MG/DL (ref 7–30)
CALCIUM SERPL-MCNC: 8.6 MG/DL (ref 8.5–10.1)
CHLORIDE SERPL-SCNC: 102 MMOL/L (ref 94–109)
CO2 SERPL-SCNC: 28 MMOL/L (ref 20–32)
CREAT SERPL-MCNC: 1.91 MG/DL (ref 0.66–1.25)
ERYTHROCYTE [DISTWIDTH] IN BLOOD BY AUTOMATED COUNT: 15.9 % (ref 10–15)
GFR SERPL CREATININE-BSD FRML MDRD: 36 ML/MIN/{1.73_M2}
GLUCOSE BLDC GLUCOMTR-MCNC: 130 MG/DL (ref 70–99)
GLUCOSE BLDC GLUCOMTR-MCNC: 199 MG/DL (ref 70–99)
GLUCOSE BLDC GLUCOMTR-MCNC: 92 MG/DL (ref 70–99)
GLUCOSE SERPL-MCNC: 97 MG/DL (ref 70–99)
HCT VFR BLD AUTO: 38.7 % (ref 40–53)
HGB BLD-MCNC: 12.4 G/DL (ref 13.3–17.7)
INR PPP: 2.95 (ref 0.86–1.14)
MAGNESIUM SERPL-MCNC: 2.5 MG/DL (ref 1.6–2.3)
MCH RBC QN AUTO: 30.1 PG (ref 26.5–33)
MCHC RBC AUTO-ENTMCNC: 32 G/DL (ref 31.5–36.5)
MCV RBC AUTO: 94 FL (ref 78–100)
PLATELET # BLD AUTO: 185 10E9/L (ref 150–450)
POTASSIUM SERPL-SCNC: 3.7 MMOL/L (ref 3.4–5.3)
PROT SERPL-MCNC: 6.4 G/DL (ref 6.8–8.8)
RBC # BLD AUTO: 4.12 10E12/L (ref 4.4–5.9)
SODIUM SERPL-SCNC: 135 MMOL/L (ref 133–144)
WBC # BLD AUTO: 3.9 10E9/L (ref 4–11)

## 2021-04-05 PROCEDURE — 250N000009 HC RX 250: Performed by: NURSE PRACTITIONER

## 2021-04-05 PROCEDURE — 93750 INTERROGATION VAD IN PERSON: CPT | Performed by: NURSE PRACTITIONER

## 2021-04-05 PROCEDURE — 85027 COMPLETE CBC AUTOMATED: CPT | Performed by: STUDENT IN AN ORGANIZED HEALTH CARE EDUCATION/TRAINING PROGRAM

## 2021-04-05 PROCEDURE — 250N000013 HC RX MED GY IP 250 OP 250 PS 637: Performed by: NURSE PRACTITIONER

## 2021-04-05 PROCEDURE — 36415 COLL VENOUS BLD VENIPUNCTURE: CPT | Performed by: STUDENT IN AN ORGANIZED HEALTH CARE EDUCATION/TRAINING PROGRAM

## 2021-04-05 PROCEDURE — 80076 HEPATIC FUNCTION PANEL: CPT | Performed by: NURSE PRACTITIONER

## 2021-04-05 PROCEDURE — 99232 SBSQ HOSP IP/OBS MODERATE 35: CPT | Mod: 25 | Performed by: NURSE PRACTITIONER

## 2021-04-05 PROCEDURE — 80076 HEPATIC FUNCTION PANEL: CPT | Performed by: STUDENT IN AN ORGANIZED HEALTH CARE EDUCATION/TRAINING PROGRAM

## 2021-04-05 PROCEDURE — 250N000013 HC RX MED GY IP 250 OP 250 PS 637: Performed by: STUDENT IN AN ORGANIZED HEALTH CARE EDUCATION/TRAINING PROGRAM

## 2021-04-05 PROCEDURE — 250N000013 HC RX MED GY IP 250 OP 250 PS 637: Performed by: INTERNAL MEDICINE

## 2021-04-05 PROCEDURE — 214N000001 HC R&B CCU UMMC

## 2021-04-05 PROCEDURE — 80048 BASIC METABOLIC PNL TOTAL CA: CPT | Performed by: STUDENT IN AN ORGANIZED HEALTH CARE EDUCATION/TRAINING PROGRAM

## 2021-04-05 PROCEDURE — 999N001017 HC STATISTIC GLUCOSE BY METER IP

## 2021-04-05 PROCEDURE — 83735 ASSAY OF MAGNESIUM: CPT | Performed by: STUDENT IN AN ORGANIZED HEALTH CARE EDUCATION/TRAINING PROGRAM

## 2021-04-05 PROCEDURE — 250N000013 HC RX MED GY IP 250 OP 250 PS 637: Performed by: PHYSICIAN ASSISTANT

## 2021-04-05 PROCEDURE — 85610 PROTHROMBIN TIME: CPT | Performed by: STUDENT IN AN ORGANIZED HEALTH CARE EDUCATION/TRAINING PROGRAM

## 2021-04-05 RX ORDER — ACARBOSE 25 MG/1
25 TABLET ORAL
Status: DISCONTINUED | OUTPATIENT
Start: 2021-04-06 | End: 2021-04-29

## 2021-04-05 RX ORDER — WARFARIN SODIUM 5 MG/1
5 TABLET ORAL
Status: COMPLETED | OUTPATIENT
Start: 2021-04-05 | End: 2021-04-05

## 2021-04-05 RX ADMIN — HYDRALAZINE HYDROCHLORIDE 75 MG: 50 TABLET ORAL at 20:31

## 2021-04-05 RX ADMIN — ASPIRIN 81 MG CHEWABLE TABLET 81 MG: 81 TABLET CHEWABLE at 08:38

## 2021-04-05 RX ADMIN — DOCUSATE SODIUM 50 MG AND SENNOSIDES 8.6 MG 1 TABLET: 8.6; 5 TABLET, FILM COATED ORAL at 20:31

## 2021-04-05 RX ADMIN — BUMETANIDE 4 MG: 2 TABLET ORAL at 15:41

## 2021-04-05 RX ADMIN — ISOSORBIDE DINITRATE 10 MG: 10 TABLET ORAL at 15:41

## 2021-04-05 RX ADMIN — HYDRALAZINE HYDROCHLORIDE 75 MG: 50 TABLET ORAL at 15:41

## 2021-04-05 RX ADMIN — ALLOPURINOL 300 MG: 300 TABLET ORAL at 08:38

## 2021-04-05 RX ADMIN — MONTELUKAST 10 MG: 10 TABLET, FILM COATED ORAL at 21:41

## 2021-04-05 RX ADMIN — Medication 10 MG: at 23:46

## 2021-04-05 RX ADMIN — BUMETANIDE 4 MG: 2 TABLET ORAL at 08:38

## 2021-04-05 RX ADMIN — POTASSIUM CHLORIDE 40 MEQ: 750 TABLET, EXTENDED RELEASE ORAL at 20:31

## 2021-04-05 RX ADMIN — GABAPENTIN 300 MG: 300 CAPSULE ORAL at 08:37

## 2021-04-05 RX ADMIN — METHOCARBAMOL 500 MG: 500 TABLET, FILM COATED ORAL at 23:46

## 2021-04-05 RX ADMIN — ISOSORBIDE DINITRATE 10 MG: 10 TABLET ORAL at 08:38

## 2021-04-05 RX ADMIN — GABAPENTIN 600 MG: 300 CAPSULE ORAL at 21:41

## 2021-04-05 RX ADMIN — ACETAMINOPHEN 650 MG: 325 TABLET, FILM COATED ORAL at 06:00

## 2021-04-05 RX ADMIN — CETIRIZINE HYDROCHLORIDE 10 MG: 10 TABLET, FILM COATED ORAL at 08:38

## 2021-04-05 RX ADMIN — FLUTICASONE FUROATE AND VILANTEROL TRIFENATATE 1 PUFF: 100; 25 POWDER RESPIRATORY (INHALATION) at 08:38

## 2021-04-05 RX ADMIN — GABAPENTIN 600 MG: 300 CAPSULE ORAL at 15:41

## 2021-04-05 RX ADMIN — ACETAMINOPHEN 650 MG: 325 TABLET, FILM COATED ORAL at 23:46

## 2021-04-05 RX ADMIN — BUPROPION HYDROCHLORIDE 100 MG: 100 TABLET, EXTENDED RELEASE ORAL at 20:31

## 2021-04-05 RX ADMIN — ACARBOSE 25 MG: 25 TABLET ORAL at 07:15

## 2021-04-05 RX ADMIN — POTASSIUM CHLORIDE 40 MEQ: 750 TABLET, EXTENDED RELEASE ORAL at 08:37

## 2021-04-05 RX ADMIN — AMIODARONE HYDROCHLORIDE 200 MG: 200 TABLET ORAL at 08:38

## 2021-04-05 RX ADMIN — WARFARIN SODIUM 5 MG: 5 TABLET ORAL at 20:31

## 2021-04-05 RX ADMIN — BUPROPION HYDROCHLORIDE 100 MG: 100 TABLET, EXTENDED RELEASE ORAL at 08:38

## 2021-04-05 RX ADMIN — HYDRALAZINE HYDROCHLORIDE 75 MG: 50 TABLET ORAL at 08:38

## 2021-04-05 RX ADMIN — ISOSORBIDE DINITRATE 10 MG: 10 TABLET ORAL at 20:31

## 2021-04-05 RX ADMIN — ANAKINRA 100 MG: 100 INJECTION, SOLUTION SUBCUTANEOUS at 08:37

## 2021-04-05 RX ADMIN — METHOCARBAMOL 500 MG: 500 TABLET, FILM COATED ORAL at 06:00

## 2021-04-05 RX ADMIN — UMECLIDINIUM 1 PUFF: 62.5 AEROSOL, POWDER ORAL at 08:38

## 2021-04-05 ASSESSMENT — ACTIVITIES OF DAILY LIVING (ADL)
ADLS_ACUITY_SCORE: 10

## 2021-04-05 ASSESSMENT — MIFFLIN-ST. JEOR: SCORE: 1760.16

## 2021-04-05 NOTE — PROGRESS NOTES
[facility]   Cardiology II Service / Advanced Heart Failure  Daily Progress Note  Date of Service: 4/5/2021      Patient: Jim Willingham  MRN: [id number]  Admission Date: 2/8/2021  Hospital Day # 56    Assessment and Plan:  Mr. Jim Willingham is a 65yr old male with a history of NICM (LVEF < 30% per TTE 1/2021), s/p HM2 LVAD (6/2017), VT, AFib, DMII, CKD, and COPD who presented with worsening symptoms and for consideration of heart transplantation.  He has been listed for transplant, and is currently status 2E, BG O+.  His exception expires 4/8.  He has demonstrated refractory hypervolemia in spite of aggressive medication titration.      PLAN:  - no changes to plan of care today      Chronic systolic heart failure secondary to NCM (LVEF < 30% per TTE 1/2021)  Stage D, NYHA Class III  - ACEi/ARB/ARNi:  Deferred due to renal function  - Afterload reduction:  Hydralazine 75mg TID and isordil 10mg TID  - BB:  Contraindicated due to low cardiac output  - Aldosterone antagonist:  Deferred due to renal function  - SCD ppx:  CRT-d  - Fluid status:  euvolemic/slightly hypovolemic, continue bumex 4mg po twice daily     S/p HMII LVAD (6/2017)  - Antiplatelet:  Aspirin 81mg daily  - Anticoagulation:  Warfarin, dosed per pharmacy with goal INR 2.5-3 (goal increased due to power spikes/elevated flows during this hospitalization).  INR today 2.95.  - MAPs:  70-90s  - LDH: 309 (4/2), stable     VT  AFib  HRs controlled.  - Continue amiodarone 200mg once daily  - BB deferred as noted above  - continue warfarin, as noted above     WALTER on CKD III, resolved  Creatinine 2s on admission, has improved and stabilized, ranging ~1.4-1.9 overall.  - continue diuresis, as noted above  - continue to trend BMP     DMII  Low cortisol levels  Concern for hypoglycemia, resolved  H/o bariatric surgery  Last HgbA1c 1/2021 was 6.2%.  Has been on prednisone for management of carpal tunnel syndrome for about 7 years, tapered down from  9/2020 and ultimately stopped 2/2021.  Developed symptomatic hypoglycemia (BGs 50s on 3/4), so endocrine was consulted.  C-peptide was 13.9, proinsulin was 18.4 (3/4, he was normoglycemic with post-prandial glucose 84 at that time).  Serum cortisol was 7.7 (3/6), cosnytopin stim test was WNL.  Lantus and sliding scale insulin have been discontinued.  He has continued to have hypoglycemia, so was started on acarbose 4/2.  - endocrine consult, appreciate rec's  - continue to monitor BGs q4 hours  - continue acarbose 25mg daily  - per endo --> Fingerstick glucose readings might not be accurate for assessment of hypoglycemia, if the patient has another glucose reading <55 (without insulin therapy), plasma glucose should be sent for confirmation of hypoglycemia prior to correction.  While the patient is off insulin therapy, POC glucose readings above 60 are acceptable for the patient        OTHER:  Carpel tunnel, bilateral:  Evidence of thenar atrophy, s/p steroid injection 11/2020.  S/p bilateral release 2/18/21.  Appreciate ortho/plastics consults, signed off.  Continue gabapentin 300mg/600mg/600mg and tylenol as needed.  COPD/asthma:  Continue PTA breo ellipta, breo incruse, and albuterol as needed.  Rhinovirus, resolved:  S/p 5-day course of cefdinir.  Repeat respiratory virus panel was negative.  Depression:  Continue PTA buproprion.  Staph Hominis Bacteremia:  No need for surveillance blood cx per transplant ID.   Acute Gout flare:  Anakinra for daily ppx and allopurinol, per rheum.      FEN: 3 gram sodium diet   PROPHY:  Coumadin  LINES:  PIV   DISPO:  TBD pending cardiac transplant.   CODE STATUS: Full Code     =============================================================    Interval History/ROS:   Mr. Willingham states that he feels better today.  He dropped 6#, and no longer feels bloated.  His breathing has been good, and he denies SOB, PND, and orthopnea.  He is eating, with good appetite and denies nausea,  "vomiting, diarrhea, and constipation.  He notes ongoing shoulder discomfort.  He otherwise denies chest pain, palpitations, dizziness, headaches, acute vision changes, fevers, chills, cough, sore throat, and signs of bleeding.    Last 24 hr care team notes reviewed.   ROS:  4 point ROS including Respiratory, CV, GI and , other than that noted in the HPI, is negative.     Medications: Reviewed in EPIC.     Physical Exam:   BP (!) 81/73 (BP Location: Left arm)   Pulse 73   Temp 98.2  F (36.8  C) (Oral)   Resp 18   Ht 1.727 m (5' 8\")   Wt 99.1 kg (218 lb 6.4 oz)   SpO2 99%   BMI 33.21 kg/m    GENERAL: Appears alert and oriented times three. Sitting upright at edge of bed, NAD.  HEENT: Eye symmetrical and free of discharge bilaterally. Mucous membranes moist and without lesions.  NECK: Supple and without lymphadenopathy. JVD negative when sitting upright.   CV: +LVAD hum, audible S1S2  RESPIRATORY: Respirations regular, even, and unlabored. Lungs CTA throughout.   GI: Soft and non distended with normoactive bowel sounds present in all quadrants. No tenderness, rebound, guarding. No organomegaly.   EXTREMITIES: No LE edema. 2+ bilateral pedal pulses.   NEUROLOGIC: Alert and orientated x 3. CN II-XII grossly intact. No focal deficits.   MUSCULOSKELETAL: No joint swelling or tenderness.   SKIN: No jaundice. No rashes or lesions. Driveline covered, dressing c/d/i..    Data:  CMP  Recent Labs   Lab 04/05/21  0536 04/04/21  0556 04/03/21  0554 04/02/21  0651    135 134 136   POTASSIUM 3.7 4.1 4.2 3.9   CHLORIDE 102 102 101 103   CO2 28 25 26 26   ANIONGAP 6 8 8 7   GLC 97 92 78 82   BUN 44* 46* 43* 40*   CR 1.91* 1.79* 1.47* 1.53*   GFRESTIMATED 36* 39* 50* 47*   GFRESTBLACK 42* 45* 58* 55*   QAMAR 8.6 8.7 8.9 9.1   MAG 2.5* 2.6* 2.6* 2.5*   PROTTOTAL 6.4*  --   --   --    ALBUMIN 3.6  --   --   --    BILITOTAL 0.5  --   --   --    ALKPHOS 108  --   --   --    AST 35  --   --   --    ALT 40  --   --   --  "     CBC  Recent Labs   Lab 04/05/21  0536 04/02/21  0651 03/31/21  0546   WBC 3.9* 3.7* 3.8*   RBC 4.12* 4.18* 4.06*   HGB 12.4* 12.2* 12.1*   HCT 38.7* 38.6* 37.9*   MCV 94 92 93   MCH 30.1 29.2 29.8   MCHC 32.0 31.6 31.9   RDW 15.9* 16.3* 16.3*    190 174     INR  Recent Labs   Lab 04/05/21  0536 04/04/21  0556 04/03/21  0554 04/02/21  0651   INR 2.95* 2.94* 2.76* 2.39*       Patient discussed with Dr. Montgomery.      Shelia Means, DNP, NP-BC, CHFN  Advanced Heart Failure Nurse Practitioner  [facility]

## 2021-04-05 NOTE — PROCEDURES
The patient's HeartMate LVAD was interrogated 4/5/2021  * Speed 9600 rpm   * Pulsatility index 3.5-4.6   * Power 6.3-7.1 Manuel   * Flow 5-7.2 L/minute   Fluid status: euvolemic/slightly hypovolemic   Alarms were reviewed, with no LVAD alarms or signs of pump malfunction.   The driveline exit site was covered, with dressing c/d/i.   All external components were inspected and showed no evidence of damage or malfunction, none replaced.   No changes to VAD settings made.        Shelia Means DNP, FNP-BC, CHFN  Advanced Heart Failure Nurse Practitioner  [facility] [place]

## 2021-04-05 NOTE — PLAN OF CARE
D: Patient admitted 2/8 for worsening functional status and renal function concerning for worsening HF. Now listed status 2E for heart txp. Hx. NICM, VT s/p CRT-D, s/p LVAD HM 2 6/2017 as DT (however w/recent weight loss now candidate for transplant), COPD, CKD, DM2.  I/A: A&Ox4. VSSA (do not disturb orders overnight); MAPs 90s this AM (before AM meds), doppler , plan to recheck BP after AM meds today. On RA/Cpap at HS. SR (1st degree AVB) 60s-70s. LVAD #s wnl/stable/BL for patient, no alarms. C/o shoulder aches partially relieved prn tylenol, robaxin and capsaicin cream.  at HS, deferred BG overnight, BG w/AM labs 97. Adequate uop. Up ad francisco. PRN melatonin given at HS. Appeared to rest comfortably overnight.  P: Continue to monitor and notify Cards 2 with questions/concerns.

## 2021-04-06 ENCOUNTER — PRE VISIT (OUTPATIENT)
Dept: ORTHOPEDICS | Facility: CLINIC | Age: 66
End: 2021-04-06

## 2021-04-06 ENCOUNTER — APPOINTMENT (OUTPATIENT)
Dept: OCCUPATIONAL THERAPY | Facility: CLINIC | Age: 66
DRG: 001 | End: 2021-04-06
Attending: INTERNAL MEDICINE
Payer: COMMERCIAL

## 2021-04-06 LAB
ANION GAP SERPL CALCULATED.3IONS-SCNC: 7 MMOL/L (ref 3–14)
BUN SERPL-MCNC: 46 MG/DL (ref 7–30)
CALCIUM SERPL-MCNC: 8.5 MG/DL (ref 8.5–10.1)
CHLORIDE SERPL-SCNC: 102 MMOL/L (ref 94–109)
CO2 SERPL-SCNC: 26 MMOL/L (ref 20–32)
CREAT SERPL-MCNC: 1.72 MG/DL (ref 0.66–1.25)
GFR SERPL CREATININE-BSD FRML MDRD: 41 ML/MIN/{1.73_M2}
GLUCOSE BLDC GLUCOMTR-MCNC: 100 MG/DL (ref 70–99)
GLUCOSE BLDC GLUCOMTR-MCNC: 122 MG/DL (ref 70–99)
GLUCOSE BLDC GLUCOMTR-MCNC: 140 MG/DL (ref 70–99)
GLUCOSE BLDC GLUCOMTR-MCNC: 165 MG/DL (ref 70–99)
GLUCOSE BLDC GLUCOMTR-MCNC: 60 MG/DL (ref 70–99)
GLUCOSE BLDC GLUCOMTR-MCNC: 81 MG/DL (ref 70–99)
GLUCOSE BLDC GLUCOMTR-MCNC: 94 MG/DL (ref 70–99)
GLUCOSE SERPL-MCNC: 108 MG/DL (ref 70–99)
INR PPP: 2.48 (ref 0.86–1.14)
MAGNESIUM SERPL-MCNC: 2.5 MG/DL (ref 1.6–2.3)
POTASSIUM SERPL-SCNC: 3.9 MMOL/L (ref 3.4–5.3)
SODIUM SERPL-SCNC: 135 MMOL/L (ref 133–144)

## 2021-04-06 PROCEDURE — 250N000013 HC RX MED GY IP 250 OP 250 PS 637: Performed by: STUDENT IN AN ORGANIZED HEALTH CARE EDUCATION/TRAINING PROGRAM

## 2021-04-06 PROCEDURE — 250N000013 HC RX MED GY IP 250 OP 250 PS 637: Performed by: NURSE PRACTITIONER

## 2021-04-06 PROCEDURE — 97110 THERAPEUTIC EXERCISES: CPT | Mod: GO

## 2021-04-06 PROCEDURE — 80048 BASIC METABOLIC PNL TOTAL CA: CPT | Performed by: STUDENT IN AN ORGANIZED HEALTH CARE EDUCATION/TRAINING PROGRAM

## 2021-04-06 PROCEDURE — 99232 SBSQ HOSP IP/OBS MODERATE 35: CPT | Mod: GC | Performed by: INTERNAL MEDICINE

## 2021-04-06 PROCEDURE — 99232 SBSQ HOSP IP/OBS MODERATE 35: CPT | Mod: 24 | Performed by: PHYSICIAN ASSISTANT

## 2021-04-06 PROCEDURE — 36415 COLL VENOUS BLD VENIPUNCTURE: CPT | Performed by: STUDENT IN AN ORGANIZED HEALTH CARE EDUCATION/TRAINING PROGRAM

## 2021-04-06 PROCEDURE — 250N000009 HC RX 250: Performed by: NURSE PRACTITIONER

## 2021-04-06 PROCEDURE — 93750 INTERROGATION VAD IN PERSON: CPT | Performed by: PHYSICIAN ASSISTANT

## 2021-04-06 PROCEDURE — 85610 PROTHROMBIN TIME: CPT | Performed by: STUDENT IN AN ORGANIZED HEALTH CARE EDUCATION/TRAINING PROGRAM

## 2021-04-06 PROCEDURE — 83735 ASSAY OF MAGNESIUM: CPT | Performed by: STUDENT IN AN ORGANIZED HEALTH CARE EDUCATION/TRAINING PROGRAM

## 2021-04-06 PROCEDURE — 250N000013 HC RX MED GY IP 250 OP 250 PS 637: Performed by: PHYSICIAN ASSISTANT

## 2021-04-06 PROCEDURE — 214N000001 HC R&B CCU UMMC

## 2021-04-06 PROCEDURE — 999N001017 HC STATISTIC GLUCOSE BY METER IP

## 2021-04-06 PROCEDURE — 250N000013 HC RX MED GY IP 250 OP 250 PS 637: Performed by: INTERNAL MEDICINE

## 2021-04-06 RX ORDER — WARFARIN SODIUM 7.5 MG/1
7.5 TABLET ORAL
Status: COMPLETED | OUTPATIENT
Start: 2021-04-06 | End: 2021-04-06

## 2021-04-06 RX ADMIN — DOCUSATE SODIUM 50 MG AND SENNOSIDES 8.6 MG 1 TABLET: 8.6; 5 TABLET, FILM COATED ORAL at 20:22

## 2021-04-06 RX ADMIN — CAPSAICIN: 0.75 CREAM TOPICAL at 10:17

## 2021-04-06 RX ADMIN — POTASSIUM CHLORIDE 40 MEQ: 750 TABLET, EXTENDED RELEASE ORAL at 08:18

## 2021-04-06 RX ADMIN — Medication 10 MG: at 21:36

## 2021-04-06 RX ADMIN — CETIRIZINE HYDROCHLORIDE 10 MG: 10 TABLET, FILM COATED ORAL at 08:17

## 2021-04-06 RX ADMIN — GABAPENTIN 600 MG: 300 CAPSULE ORAL at 21:36

## 2021-04-06 RX ADMIN — BUPROPION HYDROCHLORIDE 100 MG: 100 TABLET, EXTENDED RELEASE ORAL at 20:22

## 2021-04-06 RX ADMIN — ANAKINRA 100 MG: 100 INJECTION, SOLUTION SUBCUTANEOUS at 08:23

## 2021-04-06 RX ADMIN — MONTELUKAST 10 MG: 10 TABLET, FILM COATED ORAL at 21:36

## 2021-04-06 RX ADMIN — UMECLIDINIUM 1 PUFF: 62.5 AEROSOL, POWDER ORAL at 08:20

## 2021-04-06 RX ADMIN — HYDRALAZINE HYDROCHLORIDE 75 MG: 50 TABLET ORAL at 20:22

## 2021-04-06 RX ADMIN — METHOCARBAMOL 500 MG: 500 TABLET, FILM COATED ORAL at 21:36

## 2021-04-06 RX ADMIN — ALLOPURINOL 300 MG: 300 TABLET ORAL at 08:16

## 2021-04-06 RX ADMIN — ACETAMINOPHEN 650 MG: 325 TABLET, FILM COATED ORAL at 08:46

## 2021-04-06 RX ADMIN — GABAPENTIN 300 MG: 300 CAPSULE ORAL at 08:18

## 2021-04-06 RX ADMIN — WARFARIN SODIUM 7.5 MG: 7.5 TABLET ORAL at 17:58

## 2021-04-06 RX ADMIN — ISOSORBIDE DINITRATE 10 MG: 10 TABLET ORAL at 08:27

## 2021-04-06 RX ADMIN — ACETAMINOPHEN 650 MG: 325 TABLET, FILM COATED ORAL at 21:36

## 2021-04-06 RX ADMIN — ISOSORBIDE DINITRATE 10 MG: 10 TABLET ORAL at 14:34

## 2021-04-06 RX ADMIN — FLUTICASONE FUROATE AND VILANTEROL TRIFENATATE 1 PUFF: 100; 25 POWDER RESPIRATORY (INHALATION) at 08:20

## 2021-04-06 RX ADMIN — ASPIRIN 81 MG CHEWABLE TABLET 81 MG: 81 TABLET CHEWABLE at 08:17

## 2021-04-06 RX ADMIN — METHOCARBAMOL 500 MG: 500 TABLET, FILM COATED ORAL at 08:47

## 2021-04-06 RX ADMIN — DOCUSATE SODIUM 50 MG AND SENNOSIDES 8.6 MG 1 TABLET: 8.6; 5 TABLET, FILM COATED ORAL at 08:48

## 2021-04-06 RX ADMIN — HYDRALAZINE HYDROCHLORIDE 75 MG: 50 TABLET ORAL at 14:33

## 2021-04-06 RX ADMIN — ACETAMINOPHEN 650 MG: 325 TABLET, FILM COATED ORAL at 17:12

## 2021-04-06 RX ADMIN — GABAPENTIN 600 MG: 300 CAPSULE ORAL at 14:32

## 2021-04-06 RX ADMIN — HYDRALAZINE HYDROCHLORIDE 75 MG: 50 TABLET ORAL at 08:26

## 2021-04-06 RX ADMIN — ISOSORBIDE DINITRATE 10 MG: 10 TABLET ORAL at 20:23

## 2021-04-06 RX ADMIN — BUMETANIDE 4 MG: 2 TABLET ORAL at 14:32

## 2021-04-06 RX ADMIN — BUMETANIDE 4 MG: 2 TABLET ORAL at 08:16

## 2021-04-06 RX ADMIN — CAPSAICIN: 0.75 CREAM TOPICAL at 17:13

## 2021-04-06 RX ADMIN — AMIODARONE HYDROCHLORIDE 200 MG: 200 TABLET ORAL at 08:26

## 2021-04-06 RX ADMIN — ACETAMINOPHEN 650 MG: 325 TABLET, FILM COATED ORAL at 12:51

## 2021-04-06 RX ADMIN — ACARBOSE 25 MG: 25 TABLET ORAL at 06:25

## 2021-04-06 RX ADMIN — BUPROPION HYDROCHLORIDE 100 MG: 100 TABLET, EXTENDED RELEASE ORAL at 08:17

## 2021-04-06 RX ADMIN — POTASSIUM CHLORIDE 40 MEQ: 750 TABLET, EXTENDED RELEASE ORAL at 20:22

## 2021-04-06 ASSESSMENT — ACTIVITIES OF DAILY LIVING (ADL)
ADLS_ACUITY_SCORE: 10
ADLS_ACUITY_SCORE: 10
ADLS_ACUITY_SCORE: 12
ADLS_ACUITY_SCORE: 12
ADLS_ACUITY_SCORE: 10
ADLS_ACUITY_SCORE: 12

## 2021-04-06 ASSESSMENT — MIFFLIN-ST. JEOR: SCORE: 1766.05

## 2021-04-06 NOTE — PROGRESS NOTES
The patient's HeartMate LVAD was interrogated 4/6/2021  * Speed 9600 rpm   * Pulsatility index 3.7   * Power 6.4 Manuel   * Flow 5.9 L/minute   Fluid status: euvolemic   Alarms were reviewed, and notable for occasional PI events with some associated speed drops.   The driveline exit site was inspected, c/d/i.   All external components were inspected and showed no evidence of damage or malfunction, none replaced.   No changes to VAD settings made

## 2021-04-06 NOTE — PROGRESS NOTES
[facility]   Cardiology II Service / Advanced Heart Failure  Daily Progress Note      Patient: Jim Willingham  MRN: [id number]  Admission Date: 2/8/2021  Hospital Day # 57    Assessment and Plan:  Mr. Jim Willingham is a 65yr old male with a history of NICM (LVEF < 30% per TTE 1/2021), s/p HM2 LVAD (6/2017), VT, AFib, DMII, CKD, and COPD who presented with worsening symptoms and for consideration of heart transplantation.  He has been listed for transplant, and is currently status 2E, BG O+.  His exception expires 4/8.  He has demonstrated refractory hypervolemia in spite of aggressive medication titration.      PLAN:  - no changes to plan of care today  - note: 1 minute of VT overnight 4/5-4/6, asymptomatic      Chronic systolic heart failure secondary to NCM (LVEF < 30% per TTE 1/2021)  Stage D, NYHA Class III  - ACEi/ARB/ARNi:  Deferred due to renal function  - Afterload reduction:  Hydralazine 75mg TID and isordil 10mg TID  - BB:  Contraindicated due to low cardiac output  - Aldosterone antagonist:  Deferred due to renal function  - SCD ppx:  CRT-D  - Fluid status:  euvolemic- continue bumex 4mg po twice daily     S/p HMII LVAD (6/2017)  - Antiplatelet:  Aspirin 81mg daily  - Anticoagulation:  Warfarin, dosed per pharmacy with goal INR 2.5-3 (goal increased due to power spikes/elevated flows during this hospitalization).  INR today 2.948  - MAPs:  Goal 65-85, overall at goal  - LDH: 309 (4/2), stable     VT  AFib  HRs controlled.  - Continue amiodarone 200mg once daily  - BB deferred as noted above  - continue warfarin, as noted above     WALTER on CKD III, resolved  Creatinine 2s on admission, has improved and stabilized, ranging ~1.4-1.9 overall.  - continue diuresis, as noted above  - continue to trend BMP     DMII  Low cortisol levels  Concern for hypoglycemia, resolved  H/o bariatric surgery  Last HgbA1c 1/2021 was 6.2%.  Has been on prednisone for management of carpal tunnel syndrome for about 7  years, tapered down from 9/2020 and ultimately stopped 2/2021.  Developed symptomatic hypoglycemia (BGs 50s on 3/4), so endocrine was consulted.  C-peptide was 13.9, proinsulin was 18.4 (3/4, he was normoglycemic with post-prandial glucose 84 at that time).  Serum cortisol was 7.7 (3/6), cosnytopin stim test was WNL.  Lantus and sliding scale insulin have been discontinued.  He has continued to have hypoglycemia, so was started on acarbose 4/2.  - endocrine consult, appreciate rec's  - continue to monitor BGs q4 hours  - continue acarbose 25mg daily  - per endo --> Fingerstick glucose readings might not be accurate for assessment of hypoglycemia, if the patient has another glucose reading <55 (without insulin therapy), plasma glucose should be sent for confirmation of hypoglycemia prior to correction.  While the patient is off insulin therapy, POC glucose readings above 60 are acceptable for the patient        OTHER:  Carpel tunnel, bilateral:  Evidence of thenar atrophy, s/p steroid injection 11/2020.  S/p bilateral release 2/18/21.  Appreciate ortho/plastics consults, signed off.  Continue gabapentin 300mg/600mg/600mg and tylenol as needed.  COPD/asthma:  Continue PTA breo ellipta, breo incruse, and albuterol as needed.  Rhinovirus, resolved:  S/p 5-day course of cefdinir.  Repeat respiratory virus panel was negative.  Depression:  Continue PTA buproprion.  Staph Hominis Bacteremia:  No need for surveillance blood cx per transplant ID.   Acute Gout flare:  Anakinra for daily ppx and allopurinol, per rheum.      FEN: 3 gram sodium diet   PROPHY:  Coumadin  LINES:  PIV   DISPO:  TBD pending cardiac transplant.   CODE STATUS: Full Code     =============================================================    Interval History/ROS:   Mr. Willingham states that he feels well today.  His breathing has been good, and he denies SOB, PND, and orthopnea. Bloating resolved.  He is eating, with good appetite and denies nausea, vomiting,  "diarrhea, and constipation.   He otherwise denies chest pain, palpitations, dizziness, headaches, acute vision changes, fevers, chills, cough, sore throat, and signs of bleeding.    Last 24 hr care team notes reviewed.   ROS:  4 point ROS including Respiratory, CV, GI and , other than that noted in the HPI, is negative.     Medications: Reviewed in EPIC.     Physical Exam:   BP (!) 84/68 (BP Location: Left arm)   Pulse 72   Temp 97.7  F (36.5  C) (Oral)   Resp 18   Ht 1.727 m (5' 8\")   Wt 99.7 kg (219 lb 11.2 oz)   SpO2 95%   BMI 33.41 kg/m    GENERAL: Appears alert and interacting appropriatly. Sitting upright at edge of bed, NAD.  HEENT: Eye symmetrical and free of discharge bilaterally. Mucous membranes moist and without lesions.  NECK: Supple and without lymphadenopathy. JVD negative when sitting upright.   CV: +LVAD hum, audible S1S2  RESPIRATORY: Respirations regular, even, and unlabored. Lungs CTA throughout.   GI: Soft and non distended with normoactive bowel sounds present in all quadrants. No tenderness, rebound, guarding. No organomegaly.   EXTREMITIES: No LE edema. 2+ bilateral pedal pulses.   NEUROLOGIC: Alert and interacting appropriatly. No focal deficits.   MUSCULOSKELETAL: No joint swelling or tenderness.   SKIN: No jaundice. No rashes or lesions. Driveline covered, dressing c/d/i..    Data:  CMP  Recent Labs   Lab 04/06/21  0548 04/05/21  0536 04/04/21  0556 04/03/21  0554    135 135 134   POTASSIUM 3.9 3.7 4.1 4.2   CHLORIDE 102 102 102 101   CO2 26 28 25 26   ANIONGAP 7 6 8 8   * 97 92 78   BUN 46* 44* 46* 43*   CR 1.72* 1.91* 1.79* 1.47*   GFRESTIMATED 41* 36* 39* 50*   GFRESTBLACK 48* 42* 45* 58*   QAMAR 8.5 8.6 8.7 8.9   MAG 2.5* 2.5* 2.6* 2.6*   PROTTOTAL  --  6.4*  --   --    ALBUMIN  --  3.6  --   --    BILITOTAL  --  0.5  --   --    ALKPHOS  --  108  --   --    AST  --  35  --   --    ALT  --  40  --   --      CBC  Recent Labs   Lab 04/05/21  0536 04/02/21  0651 " 03/31/21  0546   WBC 3.9* 3.7* 3.8*   RBC 4.12* 4.18* 4.06*   HGB 12.4* 12.2* 12.1*   HCT 38.7* 38.6* 37.9*   MCV 94 92 93   MCH 30.1 29.2 29.8   MCHC 32.0 31.6 31.9   RDW 15.9* 16.3* 16.3*    190 174     INR  Recent Labs   Lab 04/06/21  0548 04/05/21  0536 04/04/21  0556 04/03/21  0554   INR 2.48* 2.95* 2.94* 2.76*       Patient discussed with Dr. Montgomery.      Didi Butler PA-C  [facility]

## 2021-04-06 NOTE — PLAN OF CARE
Presents for worsening dyspnea on exertion, lethargy. Decompensated heart failure. Currently here for heart txp waitlist status 2E. Pmhx:  NICM EF 20% c/b VT s/p CRT-D s/p HMII LVAD 2017 originally intended as destination therapy 2/2 obesity, recent weight loss, afib s/p DCCV on amiodarone and warfarin, staph hominis bacteremia, CKD3, COPD    Code status: Full     Team: cards 2    Neuro: ao*4  Cardiac/Tele:  SR w/ 1*AVB w/ BBB, LVAD #'s wnl  Respiratory: room air  GI/: urinating via urinal/toliet  Diet/Appetite: 3g na   Skin: left knee abrasion open to air, LVAD dressing CDI and changed  Endocrine: ACHS  LDAs: R PIV SL  Activity: up ad francisco  Pain: Shoulder pain radiating to arms.     Nuris Darling, RN  Time cared for 0700 - 2330

## 2021-04-06 NOTE — PROGRESS NOTES
"Endocrine Progress Note:          Assessment/Plan:     Jim Willingham is a 65 year old male with PMHx of non-ischemic cardiomyopathy with EF 20% c/b VT s/p CRT-D, placement of LVAD on 6/19/2017, chronic kidney disease stage III, type II diabetes, atrial fibrillation, COPD, depression, gastric bypass in 2003. Admitted with worsening of functional status, WALTER. on CKD,  with concern for worsening heart failure, he is now a transplant candidate and has refractory hypervolemia.     # Reactive/ postprandial hypoglycemia  # history of type II diabetes  # History of bariatric surgery 2003, 363 lbs down to 220 lbs, then back to 260 lbs, now 222 lbs - total lost 140 lbs    Patient has postprandial hypoglycemia based on his history, and improvement in hypoglycemia after acarbose and dietary modification.   He experienced an episode of reactive hypoglycemia with glucose dipping down to 47 on the morning of 4/1. Started on Acarbose 25 mg with breakfast on 4/2, BG was 35 after breakfast that day. Since then no hypoglycemia for 3 days until 4/6 when BG was 60 at 1016hrs.     Of note, other causes were worked up in initial consult by our team. \"Last HbA1c was 6.2% on 1/7/21. He has not received insulin glargine since 2/10.Last dose of insulin aspart was on 3/01  C-peptide and proinsulin levels were checked for assessment of hypoglycemia, 13.9 and 18.4 respectively (the patient was normoglycemic with post prandial plasma glucose of 84 at that time- after treatment for hypoglycemia). Adrenal insufficiency ruled out (see below)\"       Plan:  - Continue dietary modification: Eat food with more fibres. Avoid meals with high carbs, eat small frequent meals.  - Continue acarbose 25mg daily with breakfast. Patient is tolerating it well. He would like to continue the same dose for now and if had another episode of hypoglycemia, would consider increasing to 50 mg with breakfast  - If he has low BG, treat with juice/ simple sugar PLUS protein " "to avoid reactive hypoglycemia after treatment.    # Low AM cortisol.  History of prolonged corticosteroid use (recently discontinued): Adrenal insufficiency ruled out  Had been on Prednisone 20 mg daily for about 7 years (for management of pain secondary to carpal tunnel syndrome), tapered down from Sep 2020 and stopped in Feb 2021    AM cortisol less than 10  cosyntropin stimulation test (250 mcg) done 3/8 showed appropriate stimulation of adrenals.               Baseline cortisol 7.4             Cortisol at 60 min 23.9       This was a virtual telephone visit using [employer] estelita.  Fellow alone Telephone start time: 02:03 pm  Fellow alone Telephone end time:02:09 pm      The case was discussed with my attending, Dr. Mims.    Laurence Loo MD  PGY-4 Endocrine Fellow  Pager # [phone]     Attending tie-in note  I saw the patient with endocrine fellow Dr. Loo and directly examined patient and discussed. Agree above note and plan.       Jacque Mims MD  Staff Physician  Endocrinology and Metabolism  [facility]  License: [id number]  Pager: [phone]              Interval History:     This morning he ate oatmeal, boiled egg and tea at around 7:20 am. BG around 1016 hrs was 60. Patient was asymptomatic (he usually feels symptoms). It was treated with apple juice that improved BG to 81, received orange juice and PB with improvement to 100.   He ate octavio sandwich in lunch, BG after that was 140.    He is taking acarbose 25 mg, 15 min before BF and is tolerating it well, reports being a little bit gassy. No complaints of abdominal pain,  Bloating, diarrhea or excessive flatulence.     Orders Placed This Encounter      Combination Diet 3 gm NA Diet    ROS:    Review of pertinent systems was negative except as noted above.          Exam:      Blood pressure (!) 88/78, pulse 70, temperature 97.7  F (36.5  C), temperature source Oral, resp. rate 18, height 1.727 m (5' 8\"), weight 99.7 kg (219 lb " 11.2 oz), SpO2 95 %.  General: appears well, eating well.   Mental status: alert communicating clearly.  Resp: no acute distress           Data:     Lab Results   Component Value Date    A1C 6.2 01/07/2021    A1C 5.7 11/05/2020    A1C 7.5 08/05/2020    A1C 7.0 01/21/2020    A1C 6.7 05/24/2017       Recent Labs   Lab 04/06/21  1431 04/06/21  1113 04/06/21  1048 04/06/21  1033 04/06/21  1016 04/06/21  0548 04/05/21  2141 04/05/21  0536 04/05/21  0536 04/04/21  0556 04/04/21  0556 04/03/21  0554 04/03/21  0554 04/02/21  0651 04/02/21  0651 04/01/21  0605 04/01/21  0605   GLC  --   --   --   --   --  108*  --   --  97  --  92  --  78  --  82  --  95   * 100* 94 81 60*  --  199*   < >  --    < >  --    < >  --    < >  --    < >  --     < > = values in this interval not displayed.

## 2021-04-06 NOTE — PLAN OF CARE
D: Admitted 2/08 for worsening functional status and renal function concerning for worsening heart failure. Now listed status 2E on heart transplant list.   Hx: COPD, CKD3, DM2, NICM (EF 20%), VT/VF s/p CRT-D s/p LVAD HM2 2017 initially as destination therapy d/t obesity, but pt now eligible for transplant due to weight loss.     I: Monitored vitals and assessed pt status.   PRN: Robaxin, Tylenol, Melatonin  Tele: SR w/ 1st degree AVB  O2: RA, home CPAP at night  Mobility: independent     A: A0x4. VSS. LVAD #'s WNL. No alarms overnight. Per tele trend, pt had 1 min of VT with rates 100-110's at 7:23pm and another few short runs this AM with same rate. Asymptomatic and VSS. Osmin Kwon, paged FYI.  Denies dizziness. Afebrile. Urinating adequately. LBM 4/5. 3g Na diet. BG check deferred overnight d/t DND orders.  at HS. Do not disturb orders respected from 9001-5905. C/o bilateral shoulder pain. Able to make needs known.      P: Continue to monitor Pt status and report changes to Cards 2.

## 2021-04-06 NOTE — PROVIDER NOTIFICATION
Pt blood glucose was 60 at the 1000 check. Pt was asymptomatic. Protocol enacted and blood glucose brought up to 100. Morning acarbose was given before breakfast per MAR. Provider notified of the event. Continue to monitor. Last check was done at 1100 with the protocol, ensure 1400 check is completed. Bre Hernandez RN

## 2021-04-07 ENCOUNTER — TELEPHONE (OUTPATIENT)
Dept: TRANSPLANT | Facility: CLINIC | Age: 66
End: 2021-04-07

## 2021-04-07 VITALS — BODY MASS INDEX: 33.45 KG/M2 | WEIGHT: 220 LBS

## 2021-04-07 LAB
ANION GAP SERPL CALCULATED.3IONS-SCNC: 6 MMOL/L (ref 3–14)
BUN SERPL-MCNC: 49 MG/DL (ref 7–30)
CALCIUM SERPL-MCNC: 8.9 MG/DL (ref 8.5–10.1)
CHLORIDE SERPL-SCNC: 103 MMOL/L (ref 94–109)
CO2 SERPL-SCNC: 27 MMOL/L (ref 20–32)
CREAT SERPL-MCNC: 1.68 MG/DL (ref 0.66–1.25)
GFR SERPL CREATININE-BSD FRML MDRD: 42 ML/MIN/{1.73_M2}
GLUCOSE BLDC GLUCOMTR-MCNC: 103 MG/DL (ref 70–99)
GLUCOSE BLDC GLUCOMTR-MCNC: 72 MG/DL (ref 70–99)
GLUCOSE SERPL-MCNC: 96 MG/DL (ref 70–99)
INR PPP: 2.32 (ref 0.86–1.14)
MAGNESIUM SERPL-MCNC: 2.6 MG/DL (ref 1.6–2.3)
POTASSIUM SERPL-SCNC: 4.1 MMOL/L (ref 3.4–5.3)
SODIUM SERPL-SCNC: 136 MMOL/L (ref 133–144)

## 2021-04-07 PROCEDURE — 83735 ASSAY OF MAGNESIUM: CPT | Performed by: STUDENT IN AN ORGANIZED HEALTH CARE EDUCATION/TRAINING PROGRAM

## 2021-04-07 PROCEDURE — 250N000009 HC RX 250: Performed by: NURSE PRACTITIONER

## 2021-04-07 PROCEDURE — 250N000013 HC RX MED GY IP 250 OP 250 PS 637: Performed by: STUDENT IN AN ORGANIZED HEALTH CARE EDUCATION/TRAINING PROGRAM

## 2021-04-07 PROCEDURE — 250N000013 HC RX MED GY IP 250 OP 250 PS 637: Performed by: PHYSICIAN ASSISTANT

## 2021-04-07 PROCEDURE — 250N000013 HC RX MED GY IP 250 OP 250 PS 637: Performed by: NURSE PRACTITIONER

## 2021-04-07 PROCEDURE — 85610 PROTHROMBIN TIME: CPT | Performed by: STUDENT IN AN ORGANIZED HEALTH CARE EDUCATION/TRAINING PROGRAM

## 2021-04-07 PROCEDURE — 999N001017 HC STATISTIC GLUCOSE BY METER IP

## 2021-04-07 PROCEDURE — 80048 BASIC METABOLIC PNL TOTAL CA: CPT | Performed by: STUDENT IN AN ORGANIZED HEALTH CARE EDUCATION/TRAINING PROGRAM

## 2021-04-07 PROCEDURE — 250N000013 HC RX MED GY IP 250 OP 250 PS 637: Performed by: INTERNAL MEDICINE

## 2021-04-07 PROCEDURE — 36415 COLL VENOUS BLD VENIPUNCTURE: CPT | Performed by: STUDENT IN AN ORGANIZED HEALTH CARE EDUCATION/TRAINING PROGRAM

## 2021-04-07 PROCEDURE — 214N000001 HC R&B CCU UMMC

## 2021-04-07 PROCEDURE — 99232 SBSQ HOSP IP/OBS MODERATE 35: CPT | Mod: 24 | Performed by: PHYSICIAN ASSISTANT

## 2021-04-07 PROCEDURE — 93750 INTERROGATION VAD IN PERSON: CPT | Performed by: PHYSICIAN ASSISTANT

## 2021-04-07 RX ORDER — WARFARIN SODIUM 7.5 MG/1
7.5 TABLET ORAL
Status: COMPLETED | OUTPATIENT
Start: 2021-04-07 | End: 2021-04-07

## 2021-04-07 RX ADMIN — ACARBOSE 25 MG: 25 TABLET ORAL at 06:20

## 2021-04-07 RX ADMIN — DOCUSATE SODIUM 50 MG AND SENNOSIDES 8.6 MG 1 TABLET: 8.6; 5 TABLET, FILM COATED ORAL at 21:37

## 2021-04-07 RX ADMIN — BUMETANIDE 4 MG: 2 TABLET ORAL at 14:11

## 2021-04-07 RX ADMIN — CAPSAICIN: 0.75 CREAM TOPICAL at 12:35

## 2021-04-07 RX ADMIN — FLUTICASONE FUROATE AND VILANTEROL TRIFENATATE 1 PUFF: 100; 25 POWDER RESPIRATORY (INHALATION) at 08:16

## 2021-04-07 RX ADMIN — GABAPENTIN 300 MG: 300 CAPSULE ORAL at 08:21

## 2021-04-07 RX ADMIN — Medication 10 MG: at 21:35

## 2021-04-07 RX ADMIN — ACETAMINOPHEN 650 MG: 325 TABLET, FILM COATED ORAL at 21:36

## 2021-04-07 RX ADMIN — HYDRALAZINE HYDROCHLORIDE 75 MG: 50 TABLET ORAL at 14:11

## 2021-04-07 RX ADMIN — METHOCARBAMOL 500 MG: 500 TABLET, FILM COATED ORAL at 21:35

## 2021-04-07 RX ADMIN — GABAPENTIN 600 MG: 300 CAPSULE ORAL at 14:11

## 2021-04-07 RX ADMIN — BUPROPION HYDROCHLORIDE 100 MG: 100 TABLET, EXTENDED RELEASE ORAL at 21:35

## 2021-04-07 RX ADMIN — ASPIRIN 81 MG CHEWABLE TABLET 81 MG: 81 TABLET CHEWABLE at 08:19

## 2021-04-07 RX ADMIN — POTASSIUM CHLORIDE 40 MEQ: 750 TABLET, EXTENDED RELEASE ORAL at 08:18

## 2021-04-07 RX ADMIN — WARFARIN SODIUM 7.5 MG: 7.5 TABLET ORAL at 17:44

## 2021-04-07 RX ADMIN — BUMETANIDE 4 MG: 2 TABLET ORAL at 08:17

## 2021-04-07 RX ADMIN — ACETAMINOPHEN 650 MG: 325 TABLET, FILM COATED ORAL at 08:28

## 2021-04-07 RX ADMIN — BUPROPION HYDROCHLORIDE 100 MG: 100 TABLET, EXTENDED RELEASE ORAL at 08:21

## 2021-04-07 RX ADMIN — UMECLIDINIUM 1 PUFF: 62.5 AEROSOL, POWDER ORAL at 08:17

## 2021-04-07 RX ADMIN — ISOSORBIDE DINITRATE 10 MG: 10 TABLET ORAL at 14:11

## 2021-04-07 RX ADMIN — MONTELUKAST 10 MG: 10 TABLET, FILM COATED ORAL at 21:36

## 2021-04-07 RX ADMIN — METHOCARBAMOL 500 MG: 500 TABLET, FILM COATED ORAL at 08:28

## 2021-04-07 RX ADMIN — POTASSIUM CHLORIDE 40 MEQ: 750 TABLET, EXTENDED RELEASE ORAL at 21:36

## 2021-04-07 RX ADMIN — CETIRIZINE HYDROCHLORIDE 10 MG: 10 TABLET, FILM COATED ORAL at 08:20

## 2021-04-07 RX ADMIN — ANAKINRA 100 MG: 100 INJECTION, SOLUTION SUBCUTANEOUS at 08:16

## 2021-04-07 RX ADMIN — ACETAMINOPHEN 650 MG: 325 TABLET, FILM COATED ORAL at 12:17

## 2021-04-07 RX ADMIN — ALLOPURINOL 300 MG: 300 TABLET ORAL at 08:21

## 2021-04-07 RX ADMIN — ISOSORBIDE DINITRATE 10 MG: 10 TABLET ORAL at 21:36

## 2021-04-07 RX ADMIN — HYDRALAZINE HYDROCHLORIDE 75 MG: 50 TABLET ORAL at 08:19

## 2021-04-07 RX ADMIN — HYDRALAZINE HYDROCHLORIDE 75 MG: 50 TABLET ORAL at 21:35

## 2021-04-07 RX ADMIN — ISOSORBIDE DINITRATE 10 MG: 10 TABLET ORAL at 08:20

## 2021-04-07 RX ADMIN — GABAPENTIN 600 MG: 300 CAPSULE ORAL at 21:36

## 2021-04-07 RX ADMIN — CAPSAICIN: 0.75 CREAM TOPICAL at 21:46

## 2021-04-07 RX ADMIN — AMIODARONE HYDROCHLORIDE 200 MG: 200 TABLET ORAL at 08:21

## 2021-04-07 ASSESSMENT — ACTIVITIES OF DAILY LIVING (ADL)
ADLS_ACUITY_SCORE: 12

## 2021-04-07 ASSESSMENT — MIFFLIN-ST. JEOR: SCORE: 1767.87

## 2021-04-07 NOTE — PLAN OF CARE
Temp: 98  F (36.7  C) Temp src: Oral BP: 90/71 Pulse: 90   Resp: 16 SpO2: 99 % O2 Device: None (Room air)       D: Admitted with worsening HF, now status 2E awaiting transplant. Hx NICM s/p HM2 (2017), afib, RV failure, CKD, DM, CECILIA, HTN, asthma     I/A: [place] (he/him) is A&O x4. Tele in place, SR. VSS on RA, CPAP HS. VAD numbers WDL. PIV, SL. Up independently. Slept well overnight     P: Continue to monitor and follow POC. Notify Cards 2 with changes

## 2021-04-07 NOTE — PLAN OF CARE
Temp: 97.9  F (oral) BP: 91/63 Pulse: 90 Resp: 16 SpO2: 99 % (room air)        D: Admitted with worsening HF, now status 2E awaiting transplant. Hx NICM s/p HM2 (2017), afib, RV failure, CKD, DM, CECILIA, HTN, asthma     I/A: Jim (he/him) is A&O x4. Tele in place, SR w/ 1st degree AV and intraventricular blocks. Rare pacing. Concerns of HR in low 40s today however tele unable to verify. New patches applied and no signs of low HR. VSS on RA, CPAP HS. VAD numbers WDL. R PIV, SL. Up independently. 3g sodium diet.    PRN: acetaminophen and robaxin given for shoulder pain today. Pt prefers PRN capsaicin cream rather than scheduled voltaren for shoulder pain as well.      P: Watch for low HR, continue to monitor and follow POC. Notify Cards 2 with changes.    Mojgan Sharp RN

## 2021-04-07 NOTE — PLAN OF CARE
Admitted 2/8 for SOB and worsening heart failure. Now listed status 2E for heart transplant.      Neuro: A&Ox4.   Cardiac: First degree AV block with BBB. VSS. HM 2 numbers WNL. No alarms noted. Dressing CDI.  Respiratory: Sating 90s on RA. Uses home CPAP.   GI/: Voiding adequately. +BM.  Diet/appetite: On 3 gram Na diet. Good appetite.  Activity:  Up independently.  Pain: Tylenol and Robaxin given x1 with relief..  Skin: no new deficits noted.  LDA's: PIV saline locked.     Plan: Continue with POC. Notify primary team with changes.    Hours of Care: [id number]

## 2021-04-07 NOTE — PROGRESS NOTES
The patient's HeartMate LVAD was interrogated 4/7/2021  * Speed 9600 rpm   * Pulsatility index 4.3   * Power 6.5 Manuel   * Flow 5.9 L/minute   Fluid status: euvolemic   Alarms were reviewed, and notable for moderate pi events with rare associated speed drops.   The driveline exit site was inspected, c/d/i.   All external components were inspected and showed no evidence of damage or malfunction, none replaced.   No changes to VAD settings made

## 2021-04-07 NOTE — CONSULTS
"This telehealth service is appropriate and effective for delivering services in light of the necessity for social distancing to mitigate the COVID-19 epidemic and for conservation of PPE.     Patient has agreed to receiving telehealth services after being informed about it: Yes    Patient prefers video invitation/information to be sent by:   email    Mode of transmission: Telephon    Location of originating:  [facility]    Distance site:  Home office of provider for [facility]    The patient has been notified that:  Video visits will be conducted via a call with their psychologist to provide the care they need with a video conversation. Video visits may be billed at different rates depending on insurance coverage.  Patients are advised to please contact their insurance provider with any questions about their health insurance coverage. If during the course of a call the psychologist feels a video visit is not appropriate, patients will not be charged for this service.      Health Psychology                  Clinic    Department of Medicine  Deepika Cook, PhD, LP [phone]                          Cl[facility] and [facility]  [facility] Sagrario Feng, PhD, LP [phone]                  3rd [facility] Mail Code 741   Parviz Hobbs, PhD, ABPP, LP [phone]     [address] [facility], [address],  Rajani Bejarano,  PhD, LP [phone]            [place] [address] Jennifer Bahena, PhD, LP [phone]     Inpatient Health Psychology Consultation    SUBJECTIVE:  Mr. Willingham is a 65-year-old man with significant cardiac history hospitalized awaiting heart transplant. He reported that overall he has been coping well. Mood was stated to be good with no major mental health challenges endorsed. He stated that while he has been primarily alone his wife had recently visited yesterday. He states he continues to feel that heart transplant will happen \"on God's " "time\" and is patient in the waiting process. Highlighted how his acceptance of the uncontrolled ability of the situation and willa are helping him cope which he agreed. He ended this call early as his nurses arrived to work with him for medical cares, and he stated he was in no need of further follow-up today.    OBJECTIVE:  Appearance/Behavior/Orientation: Alert and oriented to person, place, time, and situation.   Cognition/Memory/Judgment:  Not formally assessed, yet no difficulties apparent upon interview.   Speech/Language: Speech was clear, logical and coherent, of normal rate, rhythm and volume.   Mood/Affect:Mood mildly dysphoric; affect was mood congruent.  Insight/Motivation:  Appropriate to situation.     ASSESSMENT:  Coping and tolerating distress well.     DIAGNOSIS:  Adjustment disorder with depressed mood    PLAN:  Plan for health psychology to follow this patient approximately once per week for the duration of their hospitalization.     Rajani Bejarano, PhD,   Clinical Health Psychologist      Start: 12:18p  Stop: 12:23p          "

## 2021-04-07 NOTE — PLAN OF CARE
Pt is a 65 year old with a history of NICM (EF 20%), VT treated with CRT-D, afib treated with DCCV, and CKD3. Pt has Heartmate 2 LVAD.  Pt presented for bilateral carpal tunnel surgery on 2/8 and has been inpatient waiting on a heart transplant.     I: Monitored vitals and assessed pt status. Supported pt comfort and preferred activities. Meds per MAR provided to manage shoulder pain. Pt reports persisting pain in shoulders. Pt has +1 dependent edema. Gave diuretic meds per MAR and encouraged ambulation. Pt experienced hypoglycemic episode around 1000, protocol was performed and blood glucose stabilized - see provider notification note. Pt was asymptomatic.     A: Alert and oriented x 4. Pt has LVAD and CRT-D in place and functioning as expected. Pt in sinus rhythm with first degree AV block and BBB.  Pt has been afebrile this shift. LVAD parameters monitored and LVAD dressing changed by RN's trained in LVADs, I personally managed other aspects of care.      Temp range: 97.7 to 98F  Pulse: 67 to 78 beats per minute  BP: systolic 83 to 88mmHg, diastolic 65 to 78mmHg, MAP 73 to 81mmHg  Respiratory: rate has been 18 breaths per minute, breath sounds clear in all lobes, oxygen sats have ranged from 95 to 99%, pt is on room air and uses CPAP while sleeping     Diet: Pt is on 3g sodium diet. Blood glucose is being monitored per orders.  Mobility: Pt is independent and steady on feet.   I/O: Input = ~1.45L, Output = ~1.5L; pt voiding into bedside urinal. Ambulation promoted, and pt given stool management meds per MAR.     P: Continue with plan of care. Pt verbalizes acceptance with plan.      Bre Hernandez RN

## 2021-04-08 LAB
ANION GAP SERPL CALCULATED.3IONS-SCNC: 8 MMOL/L (ref 3–14)
BUN SERPL-MCNC: 49 MG/DL (ref 7–30)
CALCIUM SERPL-MCNC: 8.9 MG/DL (ref 8.5–10.1)
CHLORIDE SERPL-SCNC: 102 MMOL/L (ref 94–109)
CO2 SERPL-SCNC: 27 MMOL/L (ref 20–32)
CREAT SERPL-MCNC: 1.58 MG/DL (ref 0.66–1.25)
ERYTHROCYTE [DISTWIDTH] IN BLOOD BY AUTOMATED COUNT: 15.9 % (ref 10–15)
GFR SERPL CREATININE-BSD FRML MDRD: 46 ML/MIN/{1.73_M2}
GLUCOSE BLDC GLUCOMTR-MCNC: 113 MG/DL (ref 70–99)
GLUCOSE BLDC GLUCOMTR-MCNC: 130 MG/DL (ref 70–99)
GLUCOSE BLDC GLUCOMTR-MCNC: 82 MG/DL (ref 70–99)
GLUCOSE SERPL-MCNC: 89 MG/DL (ref 70–99)
HCT VFR BLD AUTO: 42.7 % (ref 40–53)
HGB BLD-MCNC: 13.5 G/DL (ref 13.3–17.7)
INR PPP: 2.11 (ref 0.86–1.14)
MAGNESIUM SERPL-MCNC: 2.6 MG/DL (ref 1.6–2.3)
MCH RBC QN AUTO: 30.1 PG (ref 26.5–33)
MCHC RBC AUTO-ENTMCNC: 31.6 G/DL (ref 31.5–36.5)
MCV RBC AUTO: 95 FL (ref 78–100)
PLATELET # BLD AUTO: 205 10E9/L (ref 150–450)
POTASSIUM SERPL-SCNC: 4 MMOL/L (ref 3.4–5.3)
RBC # BLD AUTO: 4.48 10E12/L (ref 4.4–5.9)
SODIUM SERPL-SCNC: 136 MMOL/L (ref 133–144)
WBC # BLD AUTO: 4.3 10E9/L (ref 4–11)

## 2021-04-08 PROCEDURE — 250N000013 HC RX MED GY IP 250 OP 250 PS 637: Performed by: NURSE PRACTITIONER

## 2021-04-08 PROCEDURE — 250N000009 HC RX 250: Performed by: NURSE PRACTITIONER

## 2021-04-08 PROCEDURE — 36415 COLL VENOUS BLD VENIPUNCTURE: CPT | Performed by: STUDENT IN AN ORGANIZED HEALTH CARE EDUCATION/TRAINING PROGRAM

## 2021-04-08 PROCEDURE — 85027 COMPLETE CBC AUTOMATED: CPT | Performed by: STUDENT IN AN ORGANIZED HEALTH CARE EDUCATION/TRAINING PROGRAM

## 2021-04-08 PROCEDURE — 250N000013 HC RX MED GY IP 250 OP 250 PS 637: Performed by: INTERNAL MEDICINE

## 2021-04-08 PROCEDURE — 99232 SBSQ HOSP IP/OBS MODERATE 35: CPT | Mod: 24 | Performed by: PHYSICIAN ASSISTANT

## 2021-04-08 PROCEDURE — 83735 ASSAY OF MAGNESIUM: CPT | Performed by: STUDENT IN AN ORGANIZED HEALTH CARE EDUCATION/TRAINING PROGRAM

## 2021-04-08 PROCEDURE — 85610 PROTHROMBIN TIME: CPT | Performed by: STUDENT IN AN ORGANIZED HEALTH CARE EDUCATION/TRAINING PROGRAM

## 2021-04-08 PROCEDURE — 93750 INTERROGATION VAD IN PERSON: CPT | Performed by: PHYSICIAN ASSISTANT

## 2021-04-08 PROCEDURE — 250N000013 HC RX MED GY IP 250 OP 250 PS 637: Performed by: STUDENT IN AN ORGANIZED HEALTH CARE EDUCATION/TRAINING PROGRAM

## 2021-04-08 PROCEDURE — 999N001017 HC STATISTIC GLUCOSE BY METER IP

## 2021-04-08 PROCEDURE — 214N000001 HC R&B CCU UMMC

## 2021-04-08 PROCEDURE — 250N000013 HC RX MED GY IP 250 OP 250 PS 637: Performed by: PHYSICIAN ASSISTANT

## 2021-04-08 PROCEDURE — 80048 BASIC METABOLIC PNL TOTAL CA: CPT | Performed by: STUDENT IN AN ORGANIZED HEALTH CARE EDUCATION/TRAINING PROGRAM

## 2021-04-08 RX ADMIN — HYDRALAZINE HYDROCHLORIDE 75 MG: 50 TABLET ORAL at 21:33

## 2021-04-08 RX ADMIN — METHOCARBAMOL 500 MG: 500 TABLET, FILM COATED ORAL at 23:23

## 2021-04-08 RX ADMIN — ISOSORBIDE DINITRATE 10 MG: 10 TABLET ORAL at 21:32

## 2021-04-08 RX ADMIN — CETIRIZINE HYDROCHLORIDE 10 MG: 10 TABLET, FILM COATED ORAL at 07:43

## 2021-04-08 RX ADMIN — WARFARIN SODIUM 9 MG: 5 TABLET ORAL at 17:26

## 2021-04-08 RX ADMIN — CAPSAICIN: 0.75 CREAM TOPICAL at 17:32

## 2021-04-08 RX ADMIN — ISOSORBIDE DINITRATE 10 MG: 10 TABLET ORAL at 13:53

## 2021-04-08 RX ADMIN — GABAPENTIN 600 MG: 300 CAPSULE ORAL at 13:58

## 2021-04-08 RX ADMIN — ISOSORBIDE DINITRATE 10 MG: 10 TABLET ORAL at 07:43

## 2021-04-08 RX ADMIN — ALLOPURINOL 300 MG: 300 TABLET ORAL at 07:43

## 2021-04-08 RX ADMIN — MONTELUKAST 10 MG: 10 TABLET, FILM COATED ORAL at 21:34

## 2021-04-08 RX ADMIN — DOCUSATE SODIUM 50 MG AND SENNOSIDES 8.6 MG 1 TABLET: 8.6; 5 TABLET, FILM COATED ORAL at 21:34

## 2021-04-08 RX ADMIN — ACETAMINOPHEN 650 MG: 325 TABLET, FILM COATED ORAL at 07:56

## 2021-04-08 RX ADMIN — BUMETANIDE 4 MG: 2 TABLET ORAL at 07:43

## 2021-04-08 RX ADMIN — ANAKINRA 100 MG: 100 INJECTION, SOLUTION SUBCUTANEOUS at 07:58

## 2021-04-08 RX ADMIN — ACARBOSE 25 MG: 25 TABLET ORAL at 06:45

## 2021-04-08 RX ADMIN — ASPIRIN 81 MG CHEWABLE TABLET 81 MG: 81 TABLET CHEWABLE at 07:43

## 2021-04-08 RX ADMIN — HYDRALAZINE HYDROCHLORIDE 75 MG: 50 TABLET ORAL at 07:43

## 2021-04-08 RX ADMIN — GABAPENTIN 300 MG: 300 CAPSULE ORAL at 07:43

## 2021-04-08 RX ADMIN — Medication 10 MG: at 21:32

## 2021-04-08 RX ADMIN — ACETAMINOPHEN 650 MG: 325 TABLET, FILM COATED ORAL at 23:23

## 2021-04-08 RX ADMIN — POTASSIUM CHLORIDE 40 MEQ: 750 TABLET, EXTENDED RELEASE ORAL at 07:44

## 2021-04-08 RX ADMIN — HYDRALAZINE HYDROCHLORIDE 75 MG: 50 TABLET ORAL at 13:53

## 2021-04-08 RX ADMIN — BUMETANIDE 4 MG: 2 TABLET ORAL at 13:53

## 2021-04-08 RX ADMIN — ACETAMINOPHEN 650 MG: 325 TABLET, FILM COATED ORAL at 17:26

## 2021-04-08 RX ADMIN — AMIODARONE HYDROCHLORIDE 200 MG: 200 TABLET ORAL at 07:43

## 2021-04-08 RX ADMIN — BUPROPION HYDROCHLORIDE 100 MG: 100 TABLET, EXTENDED RELEASE ORAL at 21:32

## 2021-04-08 RX ADMIN — POTASSIUM CHLORIDE 40 MEQ: 750 TABLET, EXTENDED RELEASE ORAL at 21:32

## 2021-04-08 RX ADMIN — UMECLIDINIUM 1 PUFF: 62.5 AEROSOL, POWDER ORAL at 07:42

## 2021-04-08 RX ADMIN — GABAPENTIN 600 MG: 300 CAPSULE ORAL at 21:31

## 2021-04-08 RX ADMIN — METHOCARBAMOL 500 MG: 500 TABLET, FILM COATED ORAL at 07:56

## 2021-04-08 RX ADMIN — FLUTICASONE FUROATE AND VILANTEROL TRIFENATATE 1 PUFF: 100; 25 POWDER RESPIRATORY (INHALATION) at 07:42

## 2021-04-08 RX ADMIN — CAPSAICIN: 0.75 CREAM TOPICAL at 23:24

## 2021-04-08 RX ADMIN — BUPROPION HYDROCHLORIDE 100 MG: 100 TABLET, EXTENDED RELEASE ORAL at 07:43

## 2021-04-08 ASSESSMENT — ACTIVITIES OF DAILY LIVING (ADL)
ADLS_ACUITY_SCORE: 10

## 2021-04-08 ASSESSMENT — MIFFLIN-ST. JEOR: SCORE: 1764.24

## 2021-04-08 NOTE — PROGRESS NOTES
1900-2330:  HX:65yr old male with a history of NICM (LVEF < 30% per TTE 1/2021), s/p HM2 LVAD (6/2017), VT, AFib, DMII, CKD, and COPD who presented with worsening symptoms and for consideration of heart transplantation.  He has been listed for transplant, and is currently status 2E, BG O+.  His exception expires 4/8.  He has demonstrated refractory hypervolemia in spite of aggressive medication titration.    Cardiac:SR 70s, VAD values within patient norms  VS: BP via wall cuff 87/82/83. Patient states MAP goal is 60-85  IV:PIV-SL  Tubes:NA  Neuro:A/Ox4  Resp:denies shortness of breath  GI/:voiding, no BM this shift, took 1 senna at bedtime  Endo:glucose 103 at bedtime, ate peanuts and PB/J sandwich  Skin:intact, VAD driveline in place  Pain:C/O bilateral shoulder rotator cuff pain, treated with capsaicin, robaxin, and tylenol  Social:no visitors present, on facetime with wife and family  Plan:On heart transplant waiting list, 2E, expires 2/8. continue current cares and notify providers with questions or concerns.

## 2021-04-08 NOTE — PLAN OF CARE
D/I/A: Pt here status 2A for OHT. VSS, VAD #s stable, glucose WNL, independent, RA.  P: Continue to monitor.

## 2021-04-08 NOTE — PLAN OF CARE
D: Patient was admitted on 2/8/2021. History of NICM (LVEF < 30% per TTE 1/2021), s/p HM2 LVAD (6/2017), VT, AFib, DMII, CKD, and COPD who presented with worsening symptoms and for consideration of heart transplantation.  He has been listed for transplant, and is currently status 2E, BG O+.  His exception expires 4/8.  He has demonstrated refractory hypervolemia in spite of aggressive medication titration    I: Monitored vitals and assessed patient status.  Running: R piv is saline locked    A:  Patient's vital signs have been stable and is 95-99% on room air. His rhythm was SR 80-90 with 0-8 PVC's/min, 0-6 PAC's/MIN with a rare PVC couplet. His LVAD dressing was changed    I/O this shift:  In: 360 [P.O.:360]  Out: 1185 [Urine:1185]    Temp:  [97.5  F (36.4  C)-98.2  F (36.8  C)] 98.1  F (36.7  C)  Pulse:  [71-90] 73  Resp:  [12-18] 12  BP: (79-92)/(63-84) 92/81  SpO2:  [95 %-99 %] 98 %      P: Continue to monitor patient status and report changes to the Cards 2 treatment team.     -

## 2021-04-08 NOTE — PROGRESS NOTES
The patient's HeartMate LVAD was interrogated 4/8/2021  * Speed 9600 rpm   * Pulsatility index 4.9   * Power 6.8 Manuel   * Flow 6.5 L/minute   Fluid status: euvolemic   Alarms were reviewed, and notable for frequent PI events, rare associated speed drops.   The driveline exit site was inspected, c/d/i.   All external components were inspected and showed no evidence of damage or malfunction, none replaced.   No changes to VAD settings made

## 2021-04-08 NOTE — PROGRESS NOTES
"SPIRITUAL HEALTH SERVICES  SPIRITUAL ASSESSMENT Progress Note  [facility] ([place]) 6C     Brief check-in with pt to assess any needs. Pt attempting to rest, said \"it's a good time to rest and maybe sleep - it's rainy and cloudy outside...\" Pt said \"nothing new since you last saw me...\"     PLAN: continue to follow, visiting at least weekly while pt on unit.    Parviz Early) Jg Smith M.Div., [facility]  Staff   Pager [phone]      * [facility] remains available 24/7 for emergent requests/referrals, either by having the switchboard page the on-call  or by entering an ASAP/STAT consult in Epic (this will also page the on-call ). Routine Epic consults receive an initial response within 24 hours.*      "

## 2021-04-08 NOTE — PLAN OF CARE
D: Admitted 2/08 for worsening functional status and renal function concerning for worsening heart failure. Now listed status 2E on heart transplant list.   Hx: COPD, CKD3, DM2, NICM (EF 20%), VT/VF s/p CRT-D s/p LVAD HM2 2017 initially as destination therapy d/t obesity, but pt now eligible for transplant due to weight loss.     I: Monitored vitals and assessed pt status.   Tele: SR w/ 1st degree AVB/BBB/PVC's  O2: RA, home CPAP at night  Mobility: independent     A: A0x4. VSS. LVAD #'s WNL. Flows up to 7's at times. No alarms overnight. Denies dizziness. Afebrile. Urinating adequately. LBM 4/7. 3g Na diet. BG check deferred overnight d/t DND orders. Do not disturb orders respected from [id number]. Denies pain. Able to make needs known.     P: Continue to monitor Pt status and report changes to Cards 2.

## 2021-04-08 NOTE — PROGRESS NOTES
Status 2 Heart Extension Complete  04/08/2021  Listing Criteria:  Exception     Dr. Montgomery approved the following statement prior to the submission of Status 2 Exception form in [facility]:     65 M with NICM who had a HM3 implanted on 6/19/2017 at destination therapy (obesity). He initially thrived after surgery with significant functional improvement. Over the last 10 months, however, his functional status has deteriorated.   He has developed worsening heart failure symptoms associated with significant weight loss (50 lbs over the last year) with visible muscle wasting and worsening cardiorenal syndrome  (baseline cr 1.4 now close to 2.0). Last hemodynamics: RA18, PCWP 25, CI 2.0. While  hemodynamics improved with increased speed and increased afterload reduction, he continues to have extremely high diuretic requirements to maintain acceptable volume status which has resulted in worsening renal function on oral diuretics (note, no AI on echo and normal inflow and outflow velocities).   In addition to his poor hemodynamics, he has developed several other significant LVAD complications.  He developed recurrent bacteremia (staph hominis) requiring prolonged IV antibiotics (last positive culture 9/2020).  He also recently had several episodes of sustained VT and finally therapy for VF on 12/11/2020.   As he is a large blood group O, we are asking for status 2 E given multiple LVAD complications and risk of losing his window of candidacy for heart transplantation.     Status 2 Extension due 04/23/2021

## 2021-04-09 LAB
ANION GAP SERPL CALCULATED.3IONS-SCNC: 7 MMOL/L (ref 3–14)
BUN SERPL-MCNC: 53 MG/DL (ref 7–30)
CALCIUM SERPL-MCNC: 8.8 MG/DL (ref 8.5–10.1)
CHLORIDE SERPL-SCNC: 104 MMOL/L (ref 94–109)
CO2 SERPL-SCNC: 26 MMOL/L (ref 20–32)
CREAT SERPL-MCNC: 1.63 MG/DL (ref 0.66–1.25)
GFR SERPL CREATININE-BSD FRML MDRD: 44 ML/MIN/{1.73_M2}
GLUCOSE BLDC GLUCOMTR-MCNC: 102 MG/DL (ref 70–99)
GLUCOSE BLDC GLUCOMTR-MCNC: 151 MG/DL (ref 70–99)
GLUCOSE BLDC GLUCOMTR-MCNC: 161 MG/DL (ref 70–99)
GLUCOSE BLDC GLUCOMTR-MCNC: 81 MG/DL (ref 70–99)
GLUCOSE SERPL-MCNC: 96 MG/DL (ref 70–99)
INR PPP: 2.31 (ref 0.86–1.14)
LABORATORY COMMENT REPORT: NORMAL
LDH SERPL L TO P-CCNC: 336 U/L (ref 85–227)
MAGNESIUM SERPL-MCNC: 2.6 MG/DL (ref 1.6–2.3)
POTASSIUM SERPL-SCNC: 4 MMOL/L (ref 3.4–5.3)
SARS-COV-2 RNA RESP QL NAA+PROBE: NEGATIVE
SODIUM SERPL-SCNC: 137 MMOL/L (ref 133–144)
SPECIMEN SOURCE: NORMAL

## 2021-04-09 PROCEDURE — 83615 LACTATE (LD) (LDH) ENZYME: CPT | Performed by: STUDENT IN AN ORGANIZED HEALTH CARE EDUCATION/TRAINING PROGRAM

## 2021-04-09 PROCEDURE — 36415 COLL VENOUS BLD VENIPUNCTURE: CPT | Performed by: STUDENT IN AN ORGANIZED HEALTH CARE EDUCATION/TRAINING PROGRAM

## 2021-04-09 PROCEDURE — U0005 INFEC AGEN DETEC AMPLI PROBE: HCPCS | Performed by: INTERNAL MEDICINE

## 2021-04-09 PROCEDURE — U0003 INFECTIOUS AGENT DETECTION BY NUCLEIC ACID (DNA OR RNA); SEVERE ACUTE RESPIRATORY SYNDROME CORONAVIRUS 2 (SARS-COV-2) (CORONAVIRUS DISEASE [COVID-19]), AMPLIFIED PROBE TECHNIQUE, MAKING USE OF HIGH THROUGHPUT TECHNOLOGIES AS DESCRIBED BY CMS-2020-01-R: HCPCS | Performed by: INTERNAL MEDICINE

## 2021-04-09 PROCEDURE — 250N000013 HC RX MED GY IP 250 OP 250 PS 637: Performed by: NURSE PRACTITIONER

## 2021-04-09 PROCEDURE — 99232 SBSQ HOSP IP/OBS MODERATE 35: CPT | Mod: 24 | Performed by: PHYSICIAN ASSISTANT

## 2021-04-09 PROCEDURE — 250N000009 HC RX 250: Performed by: NURSE PRACTITIONER

## 2021-04-09 PROCEDURE — 99232 SBSQ HOSP IP/OBS MODERATE 35: CPT | Mod: GC | Performed by: INTERNAL MEDICINE

## 2021-04-09 PROCEDURE — 999N001017 HC STATISTIC GLUCOSE BY METER IP

## 2021-04-09 PROCEDURE — 80048 BASIC METABOLIC PNL TOTAL CA: CPT | Performed by: STUDENT IN AN ORGANIZED HEALTH CARE EDUCATION/TRAINING PROGRAM

## 2021-04-09 PROCEDURE — 214N000001 HC R&B CCU UMMC

## 2021-04-09 PROCEDURE — 250N000013 HC RX MED GY IP 250 OP 250 PS 637: Performed by: STUDENT IN AN ORGANIZED HEALTH CARE EDUCATION/TRAINING PROGRAM

## 2021-04-09 PROCEDURE — 85610 PROTHROMBIN TIME: CPT | Performed by: STUDENT IN AN ORGANIZED HEALTH CARE EDUCATION/TRAINING PROGRAM

## 2021-04-09 PROCEDURE — 250N000013 HC RX MED GY IP 250 OP 250 PS 637: Performed by: PHYSICIAN ASSISTANT

## 2021-04-09 PROCEDURE — 93750 INTERROGATION VAD IN PERSON: CPT | Performed by: PHYSICIAN ASSISTANT

## 2021-04-09 PROCEDURE — 83735 ASSAY OF MAGNESIUM: CPT | Performed by: STUDENT IN AN ORGANIZED HEALTH CARE EDUCATION/TRAINING PROGRAM

## 2021-04-09 PROCEDURE — 250N000013 HC RX MED GY IP 250 OP 250 PS 637: Performed by: INTERNAL MEDICINE

## 2021-04-09 RX ORDER — WARFARIN SODIUM 7.5 MG/1
7.5 TABLET ORAL
Status: COMPLETED | OUTPATIENT
Start: 2021-04-09 | End: 2021-04-09

## 2021-04-09 RX ORDER — OXYCODONE HYDROCHLORIDE 5 MG/1
5 TABLET ORAL ONCE
Status: COMPLETED | OUTPATIENT
Start: 2021-04-09 | End: 2021-04-09

## 2021-04-09 RX ADMIN — ISOSORBIDE DINITRATE 10 MG: 10 TABLET ORAL at 20:45

## 2021-04-09 RX ADMIN — MONTELUKAST 10 MG: 10 TABLET, FILM COATED ORAL at 23:55

## 2021-04-09 RX ADMIN — METHOCARBAMOL 500 MG: 500 TABLET, FILM COATED ORAL at 15:08

## 2021-04-09 RX ADMIN — FLUTICASONE FUROATE AND VILANTEROL TRIFENATATE 1 PUFF: 100; 25 POWDER RESPIRATORY (INHALATION) at 07:32

## 2021-04-09 RX ADMIN — CAPSAICIN: 0.75 CREAM TOPICAL at 07:37

## 2021-04-09 RX ADMIN — GABAPENTIN 600 MG: 300 CAPSULE ORAL at 15:07

## 2021-04-09 RX ADMIN — BUPROPION HYDROCHLORIDE 100 MG: 100 TABLET, EXTENDED RELEASE ORAL at 20:46

## 2021-04-09 RX ADMIN — ACETAMINOPHEN 650 MG: 325 TABLET, FILM COATED ORAL at 15:08

## 2021-04-09 RX ADMIN — HYDRALAZINE HYDROCHLORIDE 75 MG: 50 TABLET ORAL at 07:31

## 2021-04-09 RX ADMIN — BUMETANIDE 4 MG: 2 TABLET ORAL at 07:31

## 2021-04-09 RX ADMIN — ANAKINRA 100 MG: 100 INJECTION, SOLUTION SUBCUTANEOUS at 07:32

## 2021-04-09 RX ADMIN — AMIODARONE HYDROCHLORIDE 200 MG: 200 TABLET ORAL at 07:31

## 2021-04-09 RX ADMIN — CAPSAICIN: 0.75 CREAM TOPICAL at 20:49

## 2021-04-09 RX ADMIN — ACARBOSE 25 MG: 25 TABLET ORAL at 06:20

## 2021-04-09 RX ADMIN — METHOCARBAMOL 500 MG: 500 TABLET, FILM COATED ORAL at 23:53

## 2021-04-09 RX ADMIN — BUPROPION HYDROCHLORIDE 100 MG: 100 TABLET, EXTENDED RELEASE ORAL at 07:31

## 2021-04-09 RX ADMIN — HYDRALAZINE HYDROCHLORIDE 75 MG: 50 TABLET ORAL at 20:46

## 2021-04-09 RX ADMIN — METHOCARBAMOL 500 MG: 500 TABLET, FILM COATED ORAL at 07:37

## 2021-04-09 RX ADMIN — BUMETANIDE 4 MG: 2 TABLET ORAL at 15:07

## 2021-04-09 RX ADMIN — POTASSIUM CHLORIDE 40 MEQ: 750 TABLET, EXTENDED RELEASE ORAL at 20:46

## 2021-04-09 RX ADMIN — HYDRALAZINE HYDROCHLORIDE 75 MG: 50 TABLET ORAL at 15:07

## 2021-04-09 RX ADMIN — CETIRIZINE HYDROCHLORIDE 10 MG: 10 TABLET, FILM COATED ORAL at 07:31

## 2021-04-09 RX ADMIN — GABAPENTIN 600 MG: 300 CAPSULE ORAL at 23:53

## 2021-04-09 RX ADMIN — WARFARIN SODIUM 7.5 MG: 7.5 TABLET ORAL at 17:53

## 2021-04-09 RX ADMIN — ALLOPURINOL 300 MG: 300 TABLET ORAL at 07:31

## 2021-04-09 RX ADMIN — ACETAMINOPHEN 650 MG: 325 TABLET, FILM COATED ORAL at 07:37

## 2021-04-09 RX ADMIN — DOCUSATE SODIUM 50 MG AND SENNOSIDES 8.6 MG 1 TABLET: 8.6; 5 TABLET, FILM COATED ORAL at 20:46

## 2021-04-09 RX ADMIN — ISOSORBIDE DINITRATE 10 MG: 10 TABLET ORAL at 07:31

## 2021-04-09 RX ADMIN — OXYCODONE HYDROCHLORIDE 5 MG: 5 TABLET ORAL at 23:54

## 2021-04-09 RX ADMIN — UMECLIDINIUM 1 PUFF: 62.5 AEROSOL, POWDER ORAL at 07:32

## 2021-04-09 RX ADMIN — GABAPENTIN 300 MG: 300 CAPSULE ORAL at 07:30

## 2021-04-09 RX ADMIN — ACETAMINOPHEN 650 MG: 325 TABLET, FILM COATED ORAL at 23:55

## 2021-04-09 RX ADMIN — ISOSORBIDE DINITRATE 10 MG: 10 TABLET ORAL at 15:07

## 2021-04-09 RX ADMIN — Medication 10 MG: at 23:54

## 2021-04-09 RX ADMIN — POTASSIUM CHLORIDE 40 MEQ: 750 TABLET, EXTENDED RELEASE ORAL at 07:31

## 2021-04-09 RX ADMIN — ASPIRIN 81 MG CHEWABLE TABLET 81 MG: 81 TABLET CHEWABLE at 07:31

## 2021-04-09 ASSESSMENT — ACTIVITIES OF DAILY LIVING (ADL)
ADLS_ACUITY_SCORE: 10

## 2021-04-09 ASSESSMENT — MIFFLIN-ST. JEOR: SCORE: 1768.77

## 2021-04-09 NOTE — PLAN OF CARE
Pt had 26 beats of VT @ 0900. HR 90s. Pt asymptomatic. VSS. Pt denies any shortness of breath, chest pain/palpitations, or dizziness. Pt sitting up in bed. Cards 2 NP paged. Continue to monitor patient and notify treatment team with any changes.

## 2021-04-09 NOTE — PLAN OF CARE
D/I/A: Pt here status 2A for OHT.  VSS, robaxin and tylenol given before bed for B shoulder pain.  Pt slept between cares.  Pt refused BG overnight as has DND orders.   P:  Continue to monitor pt status and update mds with changes/concerns.

## 2021-04-09 NOTE — PLAN OF CARE
D: Admitted s/p HF decompensation, status 2E transplant   [facility] s/p CRT-D s/p HM2 LVAD 6/19/2017, VT, A-Fib, CKD Stage III, COPD, depression     I: Monitored vitals and assessed pt status.     Changed: LVAD Dressing   PRN: Tylenol, Robaxin      A: Pt A0x4. Afebrile. VSS. HRs 70s. Sinus rhythm. Sats >92% on RA. Pt denies any shortness of breath, chest pain/palpitations, nausea, dizziness or chills. HM2 LVAD. Numbers WNL. No flow alarms this shift. Dressing changed. BGs q4hrs. Pt on 3g Na+ diet. Urinating adequately. Pt up independently.          P: Pt is status 2E for heart transplant.  Continue to monitor pt status and notify Cards 2 treatment team with any changes or concerns.

## 2021-04-09 NOTE — PROGRESS NOTES
LVAD/Transplant Social Work Services Progress Note      Date of Initial Social Work Evaluation: 2/10/2021  Collaborated with: Pt     Data:  Pt is an LVAD pt now listed status 2E for heart transplant. Pt had bilateral carpal tunnel surgery 2/20.   Pt actively participated in LVAD virtual support group. Pt asked good questions and received much support from group.   Intervention: Supportive Visit   Assessment: Pt continuing to cope well with prolonged hospitalization as he waits for heart transplant. Pt enjoyed LVAD virtual support group today. Pt has no concerns.   Education provided by SW: Ongoing Social Work support  Plan:    Discharge Plans in Progress: None    Barriers to d/c plan: Waiting for heart transplant     Follow up Plan: SW to continue to follow for support and assess coping and mental health needs.

## 2021-04-09 NOTE — PROGRESS NOTES
[facility]   Cardiology II Service / Advanced Heart Failure  Daily Progress Note      Patient: Jim Willingham  MRN: [id number]  Admission Date: 2/8/2021  Hospital Day # 60    Assessment and Plan:  Mr. Jim Willingham is a 65yr old male with a history of NICM (LVEF < 30% per TTE 1/2021), s/p HM2 LVAD (6/2017), VT, AFib, DMII, CKD, and COPD who presented with worsening symptoms and for consideration of heart transplantation.  He has been listed for transplant, and is currently status 2E, BG O+.  His exception expires 4/8.  He has demonstrated refractory hypervolemia in spite of aggressive medication titration.    PLAN:  - patient reviewing Hep C Donor information  - have asked Bre Cárdenas to touch base with patient about Hep C Donor Trial concerns on Monday  - oxycodone 5 mg PO x1 tonight as patient has not been sleeping d/t shoulder pain. Patient understands that this will not be reoccurring. Continue voltaren, capsasin, tyelnol, ice packs and heat packs.  - sending waveforms on LVAD given 20 minutes of low-voltage last PM, appreciate VAD corrdinator assistance.    Chronic systolic heart failure secondary to NCM (LVEF < 30% per TTE 1/2021)  Stage D, NYHA Class III  - ACEi/ARB/ARNi:  Deferred due to renal function  - Afterload reduction:  Hydralazine 75mg TID and isordil 10mg TID  - BB:  Contraindicated due to low cardiac output  - Aldosterone antagonist:  Deferred due to renal function  - SCD ppx:  CRT-D  - Fluid status:  euvolemic- continue bumex 4mg po twice daily     S/p HMII LVAD (6/2017)  - Antiplatelet:  Aspirin 81mg daily  - Anticoagulation:  Warfarin, dosed per pharmacy with goal INR 2.5-3 (goal increased due to power spikes/elevated flows during this hospitalization).  INR today 2.11   - MAPs:  Goal 65-85, overall at goal  - LDH: 336 (4/9), stable     VT  AFib  HRs controlled.  - Continue amiodarone 200mg once daily  - BB deferred as noted above  - continue warfarin, as noted above     WALTER on  CKD III, resolved  Creatinine 2s on admission, has improved and stabilized, ranging ~1.4-1.9 overall.  - continue diuresis, as noted above  - continue to trend BMP daily     DMII  Low cortisol levels  Concern for hypoglycemia, resolved  H/o bariatric surgery  Last HgbA1c 1/2021 was 6.2%.  Has been on prednisone for management of carpal tunnel syndrome for about 7 years, tapered down from 9/2020 and ultimately stopped 2/2021.  Developed symptomatic hypoglycemia (BGs 50s on 3/4), so endocrine was consulted.  C-peptide was 13.9, proinsulin was 18.4 (3/4, he was normoglycemic with post-prandial glucose 84 at that time).  Serum cortisol was 7.7 (3/6), cosnytopin stim test was WNL.  Lantus and sliding scale insulin have been discontinued.  He has continued to have hypoglycemia, so was started on acarbose 4/2.  - endocrine consult, appreciate rec's  - continue to monitor BGs q4 hours  - continue acarbose 25mg daily  - per endo --> Fingerstick glucose readings might not be accurate for assessment of hypoglycemia, if the patient has another glucose reading <55 (without insulin therapy), plasma glucose should be sent for confirmation of hypoglycemia prior to correction.  While the patient is off insulin therapy, POC glucose readings above 60 are acceptable for the patient    B/l shoulder pain Rotator cuff injuries vs arthirits  - Capsaisin cream, Voltaran, Bengay  - Tyelnol  - Ice packs/heat packs  - PT has given patient exercises for this  - encourage activie  - 1 time oxycodone 4/9 bedtime written        OTHER:  Carpel tunnel, bilateral:  Evidence of thenar atrophy, s/p steroid injection 11/2020.  S/p bilateral release 2/18/21.  Appreciate ortho/plastics consults, signed off.  Continue gabapentin 300mg/600mg/600mg and tylenol as needed.  COPD/asthma:  Continue PTA breo ellipta, breo incruse, and albuterol as needed.  Rhinovirus, resolved:  S/p 5-day course of cefdinir.  Repeat respiratory virus panel was  "negative.  Depression:  Continue PTA buproprion.  Staph Hominis Bacteremia:  No need for surveillance blood cx per transplant ID.   Acute Gout flare:  Anakinra for daily ppx and allopurinol, per rheum.      FEN: 3 gram sodium diet   PROPHY:  Coumadin  LINES:  PIV   DISPO:  TBD pending cardiac transplant.   CODE STATUS: Full Code     ============================================================    Interval History/ROS:   Mr. Willingham states that he feels well today. His breathing has been good, and he denies SOB, PND, and orthopnea. No bloating. No Le edema. Mild headache. Significant b/l shoulder pain d/t rotator cuff issues vs arthritis, current conservative managemens are helping but still sometimes struggles to sleep d/t pain. He is eating, with good appetite and denies nausea, vomiting, diarrhea, and constipation.  He  Continues to deny chest pain, palpitations, headaches, acute vision changes, fevers, chills, cough, sore throat, and signs of bleeding.    Last 24 hr care team notes reviewed.   ROS:  4 point ROS including Respiratory, CV, GI and , other than that noted in the HPI, is negative.     Medications: Reviewed in EPIC.     Physical Exam:   BP (!) 88/70 (BP Location: Left arm)   Pulse 73   Temp 98  F (36.7  C) (Oral)   Resp 16   Ht 1.727 m (5' 8\")   Wt 99.9 kg (220 lb 4.8 oz)   SpO2 96%   BMI 33.50 kg/m    GENERAL: Appears alert and interacting appropriatly. Sitting upright at edge of bed, NAD.  HEENT: Eye symmetrical and free of discharge bilaterally. Mucous membranes moist and without lesions.  NECK: Supple and without lymphadenopathy. JVD lower third of neck at 45 degrees  CV: +LVAD hum  RESPIRATORY: Respirations regular, even, and unlabored. Lungs CTA throughout.   GI: Soft and non distended with normoactive bowel sounds present in all quadrants. No tenderness, rebound, guarding. No organomegaly.   EXTREMITIES: No LE edema. All extremities are warm and well perfused.  NEUROLOGIC: Alert and " interacting appropriatly. No focal deficits.   MUSCULOSKELETAL: No joint swelling or tenderness.   SKIN: No jaundice. No rashes or lesions. Driveline covered, dressing c/d/i..    Data:  CMP  Recent Labs   Lab 04/09/21  0622 04/08/21  0551 04/07/21  0617 04/06/21  0548 04/05/21  0536    136 136 135 135   POTASSIUM 4.0 4.0 4.1 3.9 3.7   CHLORIDE 104 102 103 102 102   CO2 26 27 27 26 28   ANIONGAP 7 8 6 7 6   GLC 96 89 96 108* 97   BUN 53* 49* 49* 46* 44*   CR 1.63* 1.58* 1.68* 1.72* 1.91*   GFRESTIMATED 44* 46* 42* 41* 36*   GFRESTBLACK 51* 53* 49* 48* 42*   QAMAR 8.8 8.9 8.9 8.5 8.6   MAG 2.6* 2.6* 2.6* 2.5* 2.5*   PROTTOTAL  --   --   --   --  6.4*   ALBUMIN  --   --   --   --  3.6   BILITOTAL  --   --   --   --  0.5   ALKPHOS  --   --   --   --  108   AST  --   --   --   --  35   ALT  --   --   --   --  40     CBC  Recent Labs   Lab 04/08/21  0551 04/05/21  0536   WBC 4.3 3.9*   RBC 4.48 4.12*   HGB 13.5 12.4*   HCT 42.7 38.7*   MCV 95 94   MCH 30.1 30.1   MCHC 31.6 32.0   RDW 15.9* 15.9*    185     INR  Recent Labs   Lab 04/09/21  0622 04/08/21  0551 04/07/21  0617 04/06/21  0548   INR 2.31* 2.11* 2.32* 2.48*       Patient discussed with Dr. Montgomery.      NAOMI YeboahC  [facility] Cardiology

## 2021-04-09 NOTE — PROGRESS NOTES
CLINICAL NUTRITION SERVICES - REASSESSMENT NOTE     Nutrition Prescription    RECOMMENDATIONS FOR MDs/PROVIDERS TO ORDER:  None at this time.     Malnutrition Status:    Patient does not meet two of the established criteria necessary for diagnosing malnutrition but is at risk for malnutrition    Recommendations already ordered by Registered Dietitian (RD):  None additionally at this time.     Future/Additional Recommendations:  1. Rec continue current diet, as ordered. Monitor need for a fluid restriction, if warranted.  2. Monitor iron status.  3. Monitor glycemic control, especially with low BG recently (now on acarbose). DM 2. Hgb A1c of 6.2 on 1/7/21. BG was 96 on 4/9/21.   4. If TFs are needed (if/when post-op Tx), would rec place feeding tube (FT) via radiology due to RNY hx and initiate TFs, Osmolite 1.5 (concentrated, maintenance TF formula) and order Prosource TF modular. Initiate TFs at 15 mL/hr, advancing rate by 10 mL Q 8 hrs (or per provider discretion, pending hemodynamic stability) to goal rate of 65 mL/hr. Osmolite 1.5 at goal of 65 mL/hr and 1 pkt Prosource TF modular: 1560 mL/day, 2340+ kcals, 97+ g PRO, 1188 ml free H20, 317 g CHO, and 0 g fiber daily.     If begin tube feeds:    - Flush FT with 30 mL water Q 4 hrs for patency. Rec provider adjust free water flushes as needed, pending fluid status.   - Ensure K+/Mg++/Phos labs are ordered daily until TFs advance to goal infusion to evaluate for sx of refeeding with nutrition received. If lytes trend low, aggressively replace lytes.       - If not already ordered, order a multivitamin/mineral (certavite 15 mL/day via FT) to help ensure micronutrient needs being met with suspected hypermetabolic demands and potential interruptions to TF infusions.   - Monitor TF and possible need to adjust nutrition support regimen if necessary, pending medical course and nutrition status.       - Monitor need to check a metabolic cart study.     - Send a nutrition  "consult for \"Registered Dietitian to Order TF per Medical Nutrition Therapy Guidelines\" if desire RD to order TFs.   5. Order the following (Sylvester-en-y Gastric Bypass) if pt agreeable:    Multivitamin/minerals: adult dose 2 times daily    Iron: 45-60 mg elemental daily (18-36 mg daily if low risk) - may partly or fully be covered in multivitamin     Calcium Citrate containing vitamin D: 500 mg 3 times daily or 600 mg 2 times daily    Vitamin B12: sublingual form of at least 500 mcg daily or injection of 1000 mcg monthly     B-50 Complex daily     EVALUATION OF THE PROGRESS TOWARD GOALS   Diet: 3 g Sodium diet since 3/24. Ordered to receive two Glucernas at 14:00 (chocolate or strawberry preferred, but varied with butter pecan).   Intake: Good diet tolerance. Flowsheets indicate (% intake) pt consuming 100% with a good appetite consistently. [employer] (meal ordering system) indicates food/beverages sent 4/4-4/8 totaled 2100 kcals and 60 g protein daily on average (however, pt eating some food from home and had a courtesy meal 4/8). Per discussion with pt and his wife, he may be eating a little less than normal due to tiring of the menu. He is finding things via room service. Despite this, pt feels he is eating adequately. Consuming two oral supplements (he likes all flavors) per day which provide 440 kcals and 20 g protein daily. However, he is eating food from home, at times (snacks or meals). Estimates he eating zero to one meal per day. Has a fridge in his room.      NEW FINDINGS   GI: Having formed, brown stools. Last stool on 4/6. Having zero to one stool per day. On scheduled senna. Per team, no N/V, diarrhea, or constipation.  Nutrition-focused physical assessment: Thenar atrophy per chart and per exam, but pt reports this is a result of carpal tunnel.   Wt hx: 118.4 kg (2/13/20), 106.2 kg (8/7/20), 103.9 kg (11/13/20), 94.8 kg (1/12/21), 99.5 kg (2/8/21, admit), 100.2 kg (2/15/21), 98.2 kg (2/22), 100 kg " "(3/2), 99.7 kg (3/11), 99.9 kg (4/9) - Pt had intentionally been trying to lose wt for Tx. Current wt is similar to admit wt. In addition, fluid status changes and diuresis may affect weights. Pt on bumex most recently.    Labs: BG was 47 mg/dL on 4/1, 25 mg/dL then 46 mg/dL on 4/2, and then 60 mg/dL on 4/6. Per provider 4/8, \"Lantus and sliding scale insulin have been discontinued.  He has continued to have hypoglycemia, so was started on acarbose 4/2.\" Per discussion with pt, hypoglycemia in the past which he attributes to bariatric surgery. He may not have sx with hypoglycemia but when he does, he has a funny feeling in the pit of his stomach and his extremities may tingle.     MALNUTRITION  % Intake: Not meeting this criteria.     % Weight Loss: Not meeting this criteria.     Subcutaneous Fat Loss: None observed  Muscle Loss: Temporal:  Mild  Fluid Accumulation/Edema: Trace, not meeting criteria  Malnutrition Diagnosis: Patient does not meet two of the established criteria necessary for diagnosing malnutrition but is at risk for malnutrition    Previous Goals   Patient to consume % of nutritionally adequate meal trays TID, or the equivalent with supplements/snacks.  Evaluation: Meeting or nearly meeting.    Previous Nutrition Diagnosis  Predicted inadequate nutrient intake (protein-energy) related to food choices, LOS increasing risk for menu fatigue, and potential upcoming surgery and unknown NPO status duration.  Evaluation: Unresolved/unchanged. Protein intake may be at the lower end of recommendation, at times.     CURRENT NUTRITION DIAGNOSIS  Predicted inadequate nutrient intake (protein-energy) related to food choices, LOS increasing risk for menu fatigue, and potential upcoming surgery with unknown NPO status duration.    INTERVENTIONS  Implementation  Nutrition education for nutrition relationship to health/disease: Importance of adequate nutrition intake, including protein and energy, was " discussed. Discussed hypoglycemia and nutrition interventions.   Medical food supplement therapy: Encouraged continued consumption of oral supplements to help provide additional kcals/protein.     Goals  Patient to consume % of nutritionally adequate meal trays TID, or the equivalent with supplements/snacks.    Monitoring/Evaluation  Progress toward goals will be monitored and evaluated per protocol.     Nutrition will continue to follow.     Sana Costello MS, RD, LD, [facility]   6C Pgr: [phone]

## 2021-04-09 NOTE — PROGRESS NOTES
The patient's HeartMate LVAD was interrogated 4/9/2021  * Speed 9600 rpm   * Pulsatility index 5.1   * Power 6.4 Manuel   * Flow 5.8 L/minute   Fluid status: euvolemic   Alarms were reviewed, and notable for moderate PI events, rare speed drops. 20 mintues of low voltage on 4/8, LVAD coordinator investigating patient cord.   The driveline exit site was inspected, c/d/i.   All external components were inspected and showed no evidence of damage or malfunction, none replaced.   No changes to VAD settings made

## 2021-04-09 NOTE — PROGRESS NOTES
"Endocrine Progress Note:          Assessment/Plan:     Jim Willingham is a 65 year old male with PMHx of non-ischemic cardiomyopathy with EF 20% c/b VT s/p CRT-D, placement of LVAD on 6/19/2017, chronic kidney disease stage III, type II diabetes, atrial fibrillation, COPD, depression, gastric bypass in 2003. Admitted with worsening of functional status, WALTER. on CKD,  with concern for worsening heart failure, he is now a transplant candidate and has refractory hypervolemia.     # Reactive/ postprandial hypoglycemia  # history of type II diabetes  # History of bariatric surgery 2003  Patient has postprandial hypoglycemia based on his history, and improvement in hypoglycemia after acarbose and dietary modification.     Of note, other causes were worked up in initial consult by our team. \"Last HbA1c was 6.2% on 1/7/21. He has not received insulin glargine since 2/10.Last dose of insulin aspart was on 3/01  C-peptide and proinsulin levels were checked for assessment of hypoglycemia, 13.9 and 18.4 respectively (the patient was normoglycemic with post prandial plasma glucose of 84 at that time- after treatment for hypoglycemia). Adrenal insufficiency ruled out (see below)\"       Plan:  - Continue dietary modification: Eat food with more fibres. Avoid meals with high carbs, eat small frequent meals.  - Continue acarbose 25mg daily with breakfast. Patient would like to continue the same dose for now. If he develops another episode of hypoglycemia, can consider increasing Acarbose to 50 mg with breakfast  - If he has low BG, treat with juice/ simple sugar PLUS protein to avoid reactive hypoglycemia after treatment.    # Low AM cortisol.  History of prolonged corticosteroid use (recently discontinued): Adrenal insufficiency ruled out  Had been on Prednisone 20 mg daily for about 7 years (for management of pain secondary to carpal tunnel syndrome), tapered down from Sep 2020 and stopped in Feb 2021    AM cortisol less than " "10  cosyntropin stimulation test (250 mcg) done 3/8 showed appropriate stimulation of adrenals.               Baseline cortisol 7.4             Cortisol at 60 min 23.9     Endocrine service will sign off. Please do not hesitate to consult us again if questions arise.    The case was discussed with my attending, Dr. Mims.    Laurence Loo MD  PGY-4 Endocrine Fellow  Pager # [phone]     Attending tie-in note  I saw the patient with endocrine fellow Dr. Loo and discussed. Agree above note and plan.       Jacque Mims MD  Staff Physician  Endocrinology and Metabolism  [facility]  License: [id number]  Pager: [phone]              Interval History:     He experienced an episode of reactive hypoglycemia with glucose dipping down to 47 on the morning of 4/1. Started on Acarbose 25 mg with breakfast on 4/2, BG was 35 after breakfast that day. Since then no hypoglycemia for 3 days until 4/6 when BG was 60 at 1016hrs, patient was asymptomatic.  He is taking acarbose 25 mg, 15 min before BF and is tolerating it well. No postprandial hypoglycemia noted after 4/6.    Physical exam: Vital signs:  Temp: 97.6  F (36.4  C) Temp src: Oral BP: 90/74 Pulse: 79   Resp: 16 SpO2: 97 % O2 Device: None (Room air)   Height: 172.7 cm (5' 8\") Weight: 99.9 kg (220 lb 4.8 oz)  Estimated body mass index is 33.5 kg/m  as calculated from the following:    Height as of this encounter: 1.727 m (5' 8\").    Weight as of this encounter: 99.9 kg (220 lb 4.8 oz).  GENERAL: Appears alert and interacting appropriatly. Sitting upright at edge of bed, NAD.  Resp: no acute distress      "

## 2021-04-10 LAB
ANION GAP SERPL CALCULATED.3IONS-SCNC: 8 MMOL/L (ref 3–14)
BUN SERPL-MCNC: 49 MG/DL (ref 7–30)
CALCIUM SERPL-MCNC: 8.7 MG/DL (ref 8.5–10.1)
CHLORIDE SERPL-SCNC: 105 MMOL/L (ref 94–109)
CO2 SERPL-SCNC: 24 MMOL/L (ref 20–32)
CREAT SERPL-MCNC: 1.58 MG/DL (ref 0.66–1.25)
GFR SERPL CREATININE-BSD FRML MDRD: 46 ML/MIN/{1.73_M2}
GLUCOSE BLDC GLUCOMTR-MCNC: 222 MG/DL (ref 70–99)
GLUCOSE BLDC GLUCOMTR-MCNC: 81 MG/DL (ref 70–99)
GLUCOSE BLDC GLUCOMTR-MCNC: 90 MG/DL (ref 70–99)
GLUCOSE BLDC GLUCOMTR-MCNC: 97 MG/DL (ref 70–99)
GLUCOSE SERPL-MCNC: 97 MG/DL (ref 70–99)
INR PPP: 2.58 (ref 0.86–1.14)
MAGNESIUM SERPL-MCNC: 2.5 MG/DL (ref 1.6–2.3)
POTASSIUM SERPL-SCNC: 4.1 MMOL/L (ref 3.4–5.3)
SODIUM SERPL-SCNC: 137 MMOL/L (ref 133–144)

## 2021-04-10 PROCEDURE — 250N000013 HC RX MED GY IP 250 OP 250 PS 637: Performed by: NURSE PRACTITIONER

## 2021-04-10 PROCEDURE — 85610 PROTHROMBIN TIME: CPT | Performed by: STUDENT IN AN ORGANIZED HEALTH CARE EDUCATION/TRAINING PROGRAM

## 2021-04-10 PROCEDURE — 250N000013 HC RX MED GY IP 250 OP 250 PS 637: Performed by: INTERNAL MEDICINE

## 2021-04-10 PROCEDURE — 250N000009 HC RX 250: Performed by: NURSE PRACTITIONER

## 2021-04-10 PROCEDURE — 999N001017 HC STATISTIC GLUCOSE BY METER IP

## 2021-04-10 PROCEDURE — 214N000001 HC R&B CCU UMMC

## 2021-04-10 PROCEDURE — 80048 BASIC METABOLIC PNL TOTAL CA: CPT | Performed by: STUDENT IN AN ORGANIZED HEALTH CARE EDUCATION/TRAINING PROGRAM

## 2021-04-10 PROCEDURE — 250N000013 HC RX MED GY IP 250 OP 250 PS 637: Performed by: PHYSICIAN ASSISTANT

## 2021-04-10 PROCEDURE — 99232 SBSQ HOSP IP/OBS MODERATE 35: CPT | Mod: 25 | Performed by: NURSE PRACTITIONER

## 2021-04-10 PROCEDURE — 250N000013 HC RX MED GY IP 250 OP 250 PS 637: Performed by: STUDENT IN AN ORGANIZED HEALTH CARE EDUCATION/TRAINING PROGRAM

## 2021-04-10 PROCEDURE — 93750 INTERROGATION VAD IN PERSON: CPT | Performed by: NURSE PRACTITIONER

## 2021-04-10 PROCEDURE — 36415 COLL VENOUS BLD VENIPUNCTURE: CPT | Performed by: STUDENT IN AN ORGANIZED HEALTH CARE EDUCATION/TRAINING PROGRAM

## 2021-04-10 PROCEDURE — 83735 ASSAY OF MAGNESIUM: CPT | Performed by: STUDENT IN AN ORGANIZED HEALTH CARE EDUCATION/TRAINING PROGRAM

## 2021-04-10 RX ORDER — WARFARIN SODIUM 3 MG/1
6 TABLET ORAL
Status: COMPLETED | OUTPATIENT
Start: 2021-04-10 | End: 2021-04-10

## 2021-04-10 RX ORDER — OXYCODONE HYDROCHLORIDE 5 MG/1
5 TABLET ORAL ONCE
Status: COMPLETED | OUTPATIENT
Start: 2021-04-10 | End: 2021-04-11

## 2021-04-10 RX ADMIN — ISOSORBIDE DINITRATE 10 MG: 10 TABLET ORAL at 08:37

## 2021-04-10 RX ADMIN — Medication 10 MG: at 22:28

## 2021-04-10 RX ADMIN — POTASSIUM CHLORIDE 40 MEQ: 750 TABLET, EXTENDED RELEASE ORAL at 20:33

## 2021-04-10 RX ADMIN — FLUTICASONE FUROATE AND VILANTEROL TRIFENATATE 1 PUFF: 100; 25 POWDER RESPIRATORY (INHALATION) at 08:38

## 2021-04-10 RX ADMIN — POTASSIUM CHLORIDE 40 MEQ: 750 TABLET, EXTENDED RELEASE ORAL at 08:36

## 2021-04-10 RX ADMIN — HYDRALAZINE HYDROCHLORIDE 75 MG: 50 TABLET ORAL at 15:05

## 2021-04-10 RX ADMIN — HYDRALAZINE HYDROCHLORIDE 75 MG: 50 TABLET ORAL at 20:32

## 2021-04-10 RX ADMIN — BUMETANIDE 4 MG: 2 TABLET ORAL at 08:36

## 2021-04-10 RX ADMIN — BUPROPION HYDROCHLORIDE 100 MG: 100 TABLET, EXTENDED RELEASE ORAL at 08:37

## 2021-04-10 RX ADMIN — DOCUSATE SODIUM 50 MG AND SENNOSIDES 8.6 MG 1 TABLET: 8.6; 5 TABLET, FILM COATED ORAL at 20:33

## 2021-04-10 RX ADMIN — METHOCARBAMOL 500 MG: 500 TABLET, FILM COATED ORAL at 08:37

## 2021-04-10 RX ADMIN — GABAPENTIN 600 MG: 300 CAPSULE ORAL at 22:28

## 2021-04-10 RX ADMIN — METHOCARBAMOL 500 MG: 500 TABLET, FILM COATED ORAL at 22:28

## 2021-04-10 RX ADMIN — BUMETANIDE 4 MG: 2 TABLET ORAL at 15:05

## 2021-04-10 RX ADMIN — BUPROPION HYDROCHLORIDE 100 MG: 100 TABLET, EXTENDED RELEASE ORAL at 20:32

## 2021-04-10 RX ADMIN — ANAKINRA 100 MG: 100 INJECTION, SOLUTION SUBCUTANEOUS at 08:39

## 2021-04-10 RX ADMIN — ACETAMINOPHEN 650 MG: 325 TABLET, FILM COATED ORAL at 15:05

## 2021-04-10 RX ADMIN — WARFARIN SODIUM 6 MG: 3 TABLET ORAL at 17:37

## 2021-04-10 RX ADMIN — UMECLIDINIUM 1 PUFF: 62.5 AEROSOL, POWDER ORAL at 08:38

## 2021-04-10 RX ADMIN — ISOSORBIDE DINITRATE 10 MG: 10 TABLET ORAL at 15:05

## 2021-04-10 RX ADMIN — ACETAMINOPHEN 650 MG: 325 TABLET, FILM COATED ORAL at 08:37

## 2021-04-10 RX ADMIN — METHOCARBAMOL 500 MG: 500 TABLET, FILM COATED ORAL at 15:05

## 2021-04-10 RX ADMIN — MONTELUKAST 10 MG: 10 TABLET, FILM COATED ORAL at 22:28

## 2021-04-10 RX ADMIN — CAPSAICIN: 0.75 CREAM TOPICAL at 08:45

## 2021-04-10 RX ADMIN — GABAPENTIN 300 MG: 300 CAPSULE ORAL at 08:37

## 2021-04-10 RX ADMIN — ISOSORBIDE DINITRATE 10 MG: 10 TABLET ORAL at 20:32

## 2021-04-10 RX ADMIN — ALLOPURINOL 300 MG: 300 TABLET ORAL at 08:37

## 2021-04-10 RX ADMIN — AMIODARONE HYDROCHLORIDE 200 MG: 200 TABLET ORAL at 08:37

## 2021-04-10 RX ADMIN — ASPIRIN 81 MG CHEWABLE TABLET 81 MG: 81 TABLET CHEWABLE at 08:37

## 2021-04-10 RX ADMIN — HYDRALAZINE HYDROCHLORIDE 75 MG: 50 TABLET ORAL at 08:37

## 2021-04-10 RX ADMIN — CETIRIZINE HYDROCHLORIDE 10 MG: 10 TABLET, FILM COATED ORAL at 08:36

## 2021-04-10 RX ADMIN — ACARBOSE 25 MG: 25 TABLET ORAL at 08:36

## 2021-04-10 RX ADMIN — GABAPENTIN 600 MG: 300 CAPSULE ORAL at 15:05

## 2021-04-10 RX ADMIN — Medication 25 MG: at 15:05

## 2021-04-10 RX ADMIN — ACETAMINOPHEN 650 MG: 325 TABLET, FILM COATED ORAL at 22:28

## 2021-04-10 ASSESSMENT — ACTIVITIES OF DAILY LIVING (ADL)
ADLS_ACUITY_SCORE: 10

## 2021-04-10 ASSESSMENT — MIFFLIN-ST. JEOR: SCORE: 1790.09

## 2021-04-10 NOTE — PROGRESS NOTES
[facility]   Cardiology II Service / Advanced Heart Failure  Daily Progress Note      Patient: Jim Willingham  MRN: [id number]  Admission Date: 2/8/2021  Hospital Day # 61    Assessment and Plan:  Mr. Jim Willingham is a 65yr old male with a history of NICM (LVEF < 30% per TTE 1/2021), s/p HM2 LVAD (6/2017), VT, AFib, DMII, CKD, and COPD who presented with worsening symptoms and for consideration of heart transplantation.  He has been listed for transplant, and is currently status 2E, BG O+.  His exception expires 4/8.  He has demonstrated refractory hypervolemia in spite of aggressive medication titration.      PLAN:  - reviewing Hep C Donor information  - pre-tx coordinator Bre Cárdenas to touch base with patient about Hep C Donor Trial concerns on Monday  - LVAD waveforms sent 4/9, given 20 minutes of low-voltage the evening of 4/8 --> appreciate VAD corrdinator assistance  - start tramadol 25-50mg po q6 hours for shoulder pain  - one additional oxycodone dose tonight -- 5mg po x 1        Chronic systolic heart failure secondary to NCM (LVEF <30% per TTE 1/2021)  Stage D, NYHA Class III  - ACEi/ARB/ARNi:  Deferred due to renal function  - Afterload reduction:  Hydralazine 75mg TID and isordil 10mg TID  - BB:  Contraindicated due to low cardiac output  - Aldosterone antagonist:  Deferred due to renal function  - SCD ppx:  CRT-D  - Fluid status:  euvolemic, continue bumex 4mg po twice daily     S/p HMII LVAD (6/2017)  - Antiplatelet:  Aspirin 81mg daily  - Anticoagulation:  Warfarin, dosed per pharmacy with goal INR 2.5-3 (goal increased due to power spikes/elevated flows during this hospitalization).  INR today 2.58.   - MAPs:  80s  - LDH: 336 (4/9), stable     VT  AFib  HRs controlled.  - Continue amiodarone 200mg once daily  - BB deferred as noted above  - continue warfarin, as noted above     WALTER on CKD III, resolved  Creatinine 2s on admission, has improved and stabilized, ranging ~1.4-1.9  overall.  - continue diuresis, as noted above  - continue to trend BMP daily     DMII  Low cortisol levels  Concern for hypoglycemia, resolved  H/o bariatric surgery  Last HgbA1c 1/2021 was 6.2%.  Has been on prednisone for management of carpal tunnel syndrome for about 7 years, tapered down from 9/2020 and ultimately stopped 2/2021.  Developed symptomatic hypoglycemia (BGs 50s on 3/4), so endocrine was consulted.  C-peptide was 13.9, proinsulin was 18.4 (3/4, he was normoglycemic with post-prandial glucose 84 at that time).  Serum cortisol was 7.7 (3/6), cosnytopin stim test was WNL.  Lantus and sliding scale insulin have been discontinued.  He has continued to have hypoglycemia, so was started on acarbose 4/2.  - endocrine consult, appreciate rec's  - continue to monitor BGs q4 hours  - continue acarbose 25mg daily  - per endo --> Fingerstick glucose readings might not be accurate for assessment of hypoglycemia, if the patient has another glucose reading <55 (without insulin therapy), plasma glucose should be sent for confirmation of hypoglycemia prior to correction.  While the patient is off insulin therapy, POC glucose readings above 60 are acceptable for the patient    B/l shoulder pain   Past rotator cuff injuries vs arthirits.  - Capsaisin cream, Voltaran, Bengay  - Tyelnol  - Ice packs/heat packs  - PT has given patient exercises for this  - encourage non-pharm pain relief as able  - tramadol 25-50mg po q6 hours prn severe pain  - one-time oxycodone 5mg po this linda to assist with sleep, have discussed limiting narcotics        OTHER:  Carpel tunnel, bilateral:  Evidence of thenar atrophy, s/p steroid injection 11/2020.  S/p bilateral release 2/18/21.  Appreciate ortho/plastics consults, signed off.  Continue gabapentin 300mg/600mg/600mg and tylenol as needed.  COPD/asthma:  Continue PTA breo ellipta, breo incruse, and albuterol as needed.  Rhinovirus, resolved:  S/p 5-day course of cefdinir.  Repeat  "respiratory virus panel was negative.  Depression:  Continue PTA buproprion.  Staph Hominis Bacteremia:  No need for surveillance blood cx per transplant ID.   Acute Gout flare:  Anakinra for daily ppx and allopurinol, per rheum.      FEN: 3 gram sodium diet   PROPHY:  Coumadin  LINES:  PIV   DISPO:  TBD pending cardiac transplant.   CODE STATUS: Full Code     ============================================================    Interval History/ROS:   Mr. Willingham states that his shoulder pain continues.  He was able to sleep soundly following his oxy last night, and requests one more dose so he can have another sound night of sleep.  He feels well otherwise, stating that he is ambulating with no exertional concerns.  His breathing is stable, and he denies SOB, PND, and orthopnea.  He is eating, with good appetite and denies nausea, vomiting, diarrhea, and constipation.  He notes weight gain from yesterday, but does not feel any retention.  He otherwise denies chest pain, palpitations, dizziness, headaches, acute vision changes, fevers, chills, cough, sore throat, and signs of bleeding.    Last 24 hr care team notes reviewed.   ROS:  4 point ROS including Respiratory, CV, GI and , other than that noted in the HPI, is negative.     Medications: Reviewed in EPIC.     Physical Exam:   BP 92/80 (BP Location: Left arm)   Pulse 72   Temp 98.1  F (36.7  C) (Oral)   Resp 16   Ht 1.727 m (5' 8\")   Wt 102.1 kg (225 lb)   SpO2 100%   BMI 34.21 kg/m    GENERAL: Appears alert and interacting appropriatly. Sitting upright at edge of bed, NAD.  HEENT: Eye symmetrical and free of discharge bilaterally. Mucous membranes moist and without lesions.  NECK: Supple and without lymphadenopathy. JVD at clavicle when sitting upright.  CV: +LVAD hum.  RESPIRATORY: Respirations regular, even, and unlabored. Lungs CTA throughout.   GI: Soft and non distended with normoactive bowel sounds present in all quadrants. No tenderness, rebound, " guarding. No organomegaly.   EXTREMITIES: No LE edema. All extremities are warm and well perfused.  NEUROLOGIC: Alert and interacting appropriatly. No focal deficits.   MUSCULOSKELETAL: No joint swelling or tenderness.   SKIN: No jaundice. No rashes or lesions. Driveline covered, dressing c/d/i.    Data:  CMP  Recent Labs   Lab 04/09/21 0622 04/08/21  0551 04/07/21  0617 04/06/21  0548 04/05/21  0536    136 136 135 135   POTASSIUM 4.0 4.0 4.1 3.9 3.7   CHLORIDE 104 102 103 102 102   CO2 26 27 27 26 28   ANIONGAP 7 8 6 7 6   GLC 96 89 96 108* 97   BUN 53* 49* 49* 46* 44*   CR 1.63* 1.58* 1.68* 1.72* 1.91*   GFRESTIMATED 44* 46* 42* 41* 36*   GFRESTBLACK 51* 53* 49* 48* 42*   QAMAR 8.8 8.9 8.9 8.5 8.6   MAG 2.6* 2.6* 2.6* 2.5* 2.5*   PROTTOTAL  --   --   --   --  6.4*   ALBUMIN  --   --   --   --  3.6   BILITOTAL  --   --   --   --  0.5   ALKPHOS  --   --   --   --  108   AST  --   --   --   --  35   ALT  --   --   --   --  40     CBC  Recent Labs   Lab 04/08/21  0551 04/05/21  0536   WBC 4.3 3.9*   RBC 4.48 4.12*   HGB 13.5 12.4*   HCT 42.7 38.7*   MCV 95 94   MCH 30.1 30.1   MCHC 31.6 32.0   RDW 15.9* 15.9*    185     INR  Recent Labs   Lab 04/09/21  0622 04/08/21  0551 04/07/21  0617 04/06/21  0548   INR 2.31* 2.11* 2.32* 2.48*       Patient discussed with Dr. Bautista.      Shelia Means, DNP, FNP-BC, CHFN  Advanced Heart Failure Nurse Practitioner  [facility]

## 2021-04-10 NOTE — PLAN OF CARE
D: Admitted s/p HF decompensation, status 2E transplant   PMH of NICM EF 20% c/b VT s/p CRT-D s/p HM2 LVAD 6/19/2017, VT, A-Fib, CKD Stage III, COPD, depression     I: Monitored vitals and assessed pt status.     Changed: LVAD Dressing   PRN: Tylenol, Robaxin, Tramadol, Capsaicin cream     A: Pt A0x4. Afebrile. VSS. HRs 70s. Sinus rhythm w/1st degree AV Block and occasional PVC's. Sats >92% on RA. Pt denies any shortness of breath, chest pain/palpitations, nausea, dizziness, or chills. Dyspnea on exertion. Bilateral shoulder pain, PRN meds given w/some relief. HM2 LVAD. Numbers WNL. No flow alarms. Driveline site WNL, Dressing changed. Pt on 3g Na+ diet. Pt up independently.       P: Pt is status 2E for heart transplant. Continue to monitor pt status and notify Cards 2 treatment team with any changes or concerns.    Hilario Sainz Hilario Sainz Erica

## 2021-04-10 NOTE — PLAN OF CARE
Pt admitted 2/8 with worsening overall function and worsening HF concern, now status 2E cardiac txp listed.  SR (rarely Paced), VS'S on RA with b/l shoulder pain and medicated with prn Zostrix cream, Tylenol, and Robaxin.  Also got one time dose of Oxy before bed.  HM II LVAD numbers WNL and no alarms noted.  Pt glucose in mid 100's and refused 400 am check.  Hep C donor concerns and f/up on Monday per MD note.  Otherwise, pt slept well and up independently.  Continue to monitor and with POC.

## 2021-04-11 LAB
ANION GAP SERPL CALCULATED.3IONS-SCNC: 4 MMOL/L (ref 3–14)
ANION GAP SERPL CALCULATED.3IONS-SCNC: 7 MMOL/L (ref 3–14)
BUN SERPL-MCNC: 45 MG/DL (ref 7–30)
BUN SERPL-MCNC: 47 MG/DL (ref 7–30)
CALCIUM SERPL-MCNC: 8.3 MG/DL (ref 8.5–10.1)
CALCIUM SERPL-MCNC: 8.7 MG/DL (ref 8.5–10.1)
CHLORIDE SERPL-SCNC: 100 MMOL/L (ref 94–109)
CHLORIDE SERPL-SCNC: 103 MMOL/L (ref 94–109)
CO2 SERPL-SCNC: 26 MMOL/L (ref 20–32)
CO2 SERPL-SCNC: 28 MMOL/L (ref 20–32)
CREAT SERPL-MCNC: 1.59 MG/DL (ref 0.66–1.25)
CREAT SERPL-MCNC: 1.63 MG/DL (ref 0.66–1.25)
GFR SERPL CREATININE-BSD FRML MDRD: 44 ML/MIN/{1.73_M2}
GFR SERPL CREATININE-BSD FRML MDRD: 45 ML/MIN/{1.73_M2}
GLUCOSE BLDC GLUCOMTR-MCNC: 104 MG/DL (ref 70–99)
GLUCOSE BLDC GLUCOMTR-MCNC: 130 MG/DL (ref 70–99)
GLUCOSE BLDC GLUCOMTR-MCNC: 84 MG/DL (ref 70–99)
GLUCOSE BLDC GLUCOMTR-MCNC: 86 MG/DL (ref 70–99)
GLUCOSE SERPL-MCNC: 108 MG/DL (ref 70–99)
GLUCOSE SERPL-MCNC: 139 MG/DL (ref 70–99)
INR PPP: 2.44 (ref 0.86–1.14)
MAGNESIUM SERPL-MCNC: 2.4 MG/DL (ref 1.6–2.3)
MAGNESIUM SERPL-MCNC: 2.5 MG/DL (ref 1.6–2.3)
POTASSIUM SERPL-SCNC: 4.1 MMOL/L (ref 3.4–5.3)
POTASSIUM SERPL-SCNC: 4.4 MMOL/L (ref 3.4–5.3)
SODIUM SERPL-SCNC: 132 MMOL/L (ref 133–144)
SODIUM SERPL-SCNC: 135 MMOL/L (ref 133–144)

## 2021-04-11 PROCEDURE — 214N000001 HC R&B CCU UMMC

## 2021-04-11 PROCEDURE — 83735 ASSAY OF MAGNESIUM: CPT | Performed by: STUDENT IN AN ORGANIZED HEALTH CARE EDUCATION/TRAINING PROGRAM

## 2021-04-11 PROCEDURE — 250N000013 HC RX MED GY IP 250 OP 250 PS 637: Performed by: NURSE PRACTITIONER

## 2021-04-11 PROCEDURE — 36415 COLL VENOUS BLD VENIPUNCTURE: CPT | Performed by: STUDENT IN AN ORGANIZED HEALTH CARE EDUCATION/TRAINING PROGRAM

## 2021-04-11 PROCEDURE — 80048 BASIC METABOLIC PNL TOTAL CA: CPT | Performed by: STUDENT IN AN ORGANIZED HEALTH CARE EDUCATION/TRAINING PROGRAM

## 2021-04-11 PROCEDURE — 250N000013 HC RX MED GY IP 250 OP 250 PS 637: Performed by: INTERNAL MEDICINE

## 2021-04-11 PROCEDURE — 99232 SBSQ HOSP IP/OBS MODERATE 35: CPT | Mod: 25 | Performed by: NURSE PRACTITIONER

## 2021-04-11 PROCEDURE — 85610 PROTHROMBIN TIME: CPT | Performed by: STUDENT IN AN ORGANIZED HEALTH CARE EDUCATION/TRAINING PROGRAM

## 2021-04-11 PROCEDURE — 250N000009 HC RX 250: Performed by: NURSE PRACTITIONER

## 2021-04-11 PROCEDURE — 999N001017 HC STATISTIC GLUCOSE BY METER IP

## 2021-04-11 PROCEDURE — 250N000013 HC RX MED GY IP 250 OP 250 PS 637: Performed by: STUDENT IN AN ORGANIZED HEALTH CARE EDUCATION/TRAINING PROGRAM

## 2021-04-11 PROCEDURE — 93750 INTERROGATION VAD IN PERSON: CPT | Performed by: NURSE PRACTITIONER

## 2021-04-11 PROCEDURE — 250N000013 HC RX MED GY IP 250 OP 250 PS 637: Performed by: PHYSICIAN ASSISTANT

## 2021-04-11 RX ORDER — WARFARIN SODIUM 7.5 MG/1
7.5 TABLET ORAL
Status: COMPLETED | OUTPATIENT
Start: 2021-04-11 | End: 2021-04-11

## 2021-04-11 RX ADMIN — HYDRALAZINE HYDROCHLORIDE 75 MG: 50 TABLET ORAL at 07:32

## 2021-04-11 RX ADMIN — Medication 25 MG: at 08:45

## 2021-04-11 RX ADMIN — ANAKINRA 100 MG: 100 INJECTION, SOLUTION SUBCUTANEOUS at 07:36

## 2021-04-11 RX ADMIN — BUPROPION HYDROCHLORIDE 100 MG: 100 TABLET, EXTENDED RELEASE ORAL at 20:07

## 2021-04-11 RX ADMIN — GABAPENTIN 600 MG: 300 CAPSULE ORAL at 23:11

## 2021-04-11 RX ADMIN — BUMETANIDE 4 MG: 2 TABLET ORAL at 15:25

## 2021-04-11 RX ADMIN — METHOCARBAMOL 500 MG: 500 TABLET, FILM COATED ORAL at 23:11

## 2021-04-11 RX ADMIN — ISOSORBIDE DINITRATE 10 MG: 10 TABLET ORAL at 20:08

## 2021-04-11 RX ADMIN — DOCUSATE SODIUM 50 MG AND SENNOSIDES 8.6 MG 1 TABLET: 8.6; 5 TABLET, FILM COATED ORAL at 20:07

## 2021-04-11 RX ADMIN — FLUTICASONE FUROATE AND VILANTEROL TRIFENATATE 1 PUFF: 100; 25 POWDER RESPIRATORY (INHALATION) at 07:34

## 2021-04-11 RX ADMIN — ASPIRIN 81 MG CHEWABLE TABLET 81 MG: 81 TABLET CHEWABLE at 07:32

## 2021-04-11 RX ADMIN — POTASSIUM CHLORIDE 40 MEQ: 750 TABLET, EXTENDED RELEASE ORAL at 20:07

## 2021-04-11 RX ADMIN — GABAPENTIN 600 MG: 300 CAPSULE ORAL at 15:24

## 2021-04-11 RX ADMIN — CETIRIZINE HYDROCHLORIDE 10 MG: 10 TABLET, FILM COATED ORAL at 07:32

## 2021-04-11 RX ADMIN — METHOCARBAMOL 500 MG: 500 TABLET, FILM COATED ORAL at 07:31

## 2021-04-11 RX ADMIN — BUMETANIDE 4 MG: 2 TABLET ORAL at 07:31

## 2021-04-11 RX ADMIN — AMIODARONE HYDROCHLORIDE 200 MG: 200 TABLET ORAL at 07:32

## 2021-04-11 RX ADMIN — POTASSIUM CHLORIDE 40 MEQ: 750 TABLET, EXTENDED RELEASE ORAL at 07:32

## 2021-04-11 RX ADMIN — ACETAMINOPHEN 650 MG: 325 TABLET, FILM COATED ORAL at 23:11

## 2021-04-11 RX ADMIN — HYDRALAZINE HYDROCHLORIDE 75 MG: 50 TABLET ORAL at 15:24

## 2021-04-11 RX ADMIN — WARFARIN SODIUM 7.5 MG: 7.5 TABLET ORAL at 17:06

## 2021-04-11 RX ADMIN — OXYCODONE HYDROCHLORIDE 5 MG: 5 TABLET ORAL at 23:11

## 2021-04-11 RX ADMIN — BUPROPION HYDROCHLORIDE 100 MG: 100 TABLET, EXTENDED RELEASE ORAL at 07:31

## 2021-04-11 RX ADMIN — ALLOPURINOL 300 MG: 300 TABLET ORAL at 07:32

## 2021-04-11 RX ADMIN — Medication 10 MG: at 23:10

## 2021-04-11 RX ADMIN — ISOSORBIDE DINITRATE 10 MG: 10 TABLET ORAL at 07:32

## 2021-04-11 RX ADMIN — ACARBOSE 25 MG: 25 TABLET ORAL at 06:40

## 2021-04-11 RX ADMIN — ISOSORBIDE DINITRATE 10 MG: 10 TABLET ORAL at 15:25

## 2021-04-11 RX ADMIN — GABAPENTIN 300 MG: 300 CAPSULE ORAL at 07:32

## 2021-04-11 RX ADMIN — MONTELUKAST 10 MG: 10 TABLET, FILM COATED ORAL at 23:11

## 2021-04-11 RX ADMIN — ACETAMINOPHEN 650 MG: 325 TABLET, FILM COATED ORAL at 15:25

## 2021-04-11 RX ADMIN — METHOCARBAMOL 500 MG: 500 TABLET, FILM COATED ORAL at 15:24

## 2021-04-11 RX ADMIN — Medication 50 MG: at 15:25

## 2021-04-11 RX ADMIN — ACETAMINOPHEN 650 MG: 325 TABLET, FILM COATED ORAL at 07:32

## 2021-04-11 RX ADMIN — HYDRALAZINE HYDROCHLORIDE 75 MG: 50 TABLET ORAL at 20:07

## 2021-04-11 RX ADMIN — UMECLIDINIUM 1 PUFF: 62.5 AEROSOL, POWDER ORAL at 07:33

## 2021-04-11 ASSESSMENT — ACTIVITIES OF DAILY LIVING (ADL)
ADLS_ACUITY_SCORE: 10

## 2021-04-11 ASSESSMENT — MIFFLIN-ST. JEOR: SCORE: 1794.18

## 2021-04-11 NOTE — PROCEDURES
The patient's HeartMate LVAD was interrogated 4/11/2021  * Speed 9600 rpm   * Pulsatility index 4.1-5.4   * Power 6.3-6.8 Manuel   * Flow 5.8-6.9 L/minute   Fluid status: euvolemic   Alarms were reviewed, with no LVAD alarms or signs of pump malfunction.   The driveline exit site was covered, with dressing c/d/i.   All external components were inspected and showed no evidence of damage or malfunction, none replaced.   No changes to VAD settings made.      Shelia Means DNP, FNP-BC, CHFN  Advanced Heart Failure Nurse Practitioner  [facility]

## 2021-04-11 NOTE — PLAN OF CARE
Pt admitted 2/8 with worsening overall function and worsening HF concern, now status 2E cardiac txp listed.  SR (rarely Paced), VS'S on RA with b/l shoulder pain and medicated with prn Tylenol, and Robaxin.  Encourage pt to talk with team regarding a stronger sleep aid than melatonin, potentially Trazodone.  HM II LVAD numbers WNL and no alarms noted.  Pt glucose 222 HS and no 200 check r/t do not disturb order.  Hep C donor concerns and f/up on Monday per MD note.  Otherwise, pt slept well and up independently.  Continue to monitor and with POC.

## 2021-04-11 NOTE — PROGRESS NOTES
[facility]   Cardiology II Service / Advanced Heart Failure  Daily Progress Note      Patient: Jim Willingham  MRN: [id number]  Admission Date: 2/8/2021  Hospital Day # 62    Assessment and Plan:  Mr. iJm Willingham is a 65yr old male with a history of NICM (LVEF < 30% per TTE 1/2021), s/p HM2 LVAD (6/2017), VT, AFib, DMII, CKD, and COPD who presented with worsening symptoms and for consideration of heart transplantation.  He has been listed for transplant, and is currently status 2E, BG O+.  His exception expires 4/8.  He has demonstrated refractory hypervolemia in spite of aggressive medication titration.      PLAN:  - reviewing Hep C Donor information  - pre-tx coordinator Bre Cárdenas to touch base with patient about Hep C Donor Trial concerns on Monday      Chronic systolic heart failure secondary to NCM (LVEF <30% per TTE 1/2021)  Stage D, NYHA Class III  - ACEi/ARB/ARNi:  Deferred due to renal function  - Afterload reduction:  Hydralazine 75mg TID and isordil 10mg TID  - BB:  Contraindicated due to low cardiac output  - Aldosterone antagonist:  Deferred due to renal function  - SCD ppx:  CRT-D  - Fluid status:  euvolemic, continue bumex 4mg po twice daily     S/p HMII LVAD (6/2017)  - Antiplatelet:  Aspirin 81mg daily  - Anticoagulation:  Warfarin, dosed per pharmacy with goal INR 2.5-3 (goal increased due to power spikes/elevated flows during this hospitalization).  INR today 2.44.   - MAPs:  80s  - LDH: 336 (4/9), stable     VT  AFib  HRs controlled.  - Continue amiodarone 200mg once daily  - BB deferred as noted above  - continue warfarin, as noted above     WALTER on CKD III, resolved  Creatinine 2s on admission, has improved and stabilized, ranging ~1.4-1.9 overall.  - continue diuresis, as noted above  - continue to trend BMP daily     DMII  Low cortisol levels  Concern for hypoglycemia, resolved  H/o bariatric surgery  Last HgbA1c 1/2021 was 6.2%.  Has been on prednisone for management of  carpal tunnel syndrome for about 7 years, tapered down from 9/2020 and ultimately stopped 2/2021.  Developed symptomatic hypoglycemia (BGs 50s on 3/4), so endocrine was consulted.  C-peptide was 13.9, proinsulin was 18.4 (3/4, he was normoglycemic with post-prandial glucose 84 at that time).  Serum cortisol was 7.7 (3/6), cosnytopin stim test was WNL.  Lantus and sliding scale insulin have been discontinued.  He has continued to have hypoglycemia, so was started on acarbose 4/2.  - endocrine consult, appreciate rec's  - continue to monitor BGs q4 hours  - continue acarbose 25mg daily  - per endo --> Fingerstick glucose readings might not be accurate for assessment of hypoglycemia, if the patient has another glucose reading <55 (without insulin therapy), plasma glucose should be sent for confirmation of hypoglycemia prior to correction.  While the patient is off insulin therapy, POC glucose readings above 60 are acceptable for the patient    B/l shoulder pain   Past rotator cuff injuries vs arthirits.  - Capsaisin cream, Voltaren, Bengay  - Tyelnol prn -- note 4 doses in the last 24 hours  - Ice packs/heat packs  - PT has given patient exercises for this  - encourage non-pharm pain relief as able  - tramadol 25-50mg po q6 hours prn severe pain -- note 2 doses in the last 24 hours          OTHER:  Carpel tunnel, bilateral:  Evidence of thenar atrophy, s/p steroid injection 11/2020.  S/p bilateral release 2/18/21.  Appreciate ortho/plastics consults, signed off.  Continue gabapentin 300mg/600mg/600mg and tylenol as needed.  COPD/asthma:  Continue PTA breo ellipta, breo incruse, and albuterol as needed.  Rhinovirus, resolved:  S/p 5-day course of cefdinir.  Repeat respiratory virus panel was negative.  Depression:  Continue PTA buproprion.  Staph Hominis Bacteremia:  No need for surveillance blood cx per transplant ID.   Acute Gout flare:  Anakinra for daily ppx and allopurinol, per rheum.      FEN: 3 gram sodium  "diet   PROPHY:  Coumadin  LINES:  PIV   DISPO:  TBD pending cardiac transplant.   CODE STATUS: Full Code     ============================================================    Interval History/ROS:   Mr. Willingham states that he feels better today.  His shoulder pain has improved.  He slept ok last night.  He has been ambulating, but not as much as he would like.  His breathing status has been good, and he denies SOB, PND, and orthopnea.  He is eating, with no nausea, vomiting, diarrhea, and constipation.  He denies fluid retention.  He otherwise denies chest pain, palpitations, dizziness, headaches, acute vision changes, fevers, chills, cough, sore throat, and signs of bleeding.    Last 24 hr care team notes reviewed.   ROS:  4 point ROS including Respiratory, CV, GI and , other than that noted in the HPI, is negative.     Medications: Reviewed in EPIC.     Physical Exam:   BP (!) 85/77 (BP Location: Left arm)   Pulse 70   Temp 97.7  F (36.5  C) (Oral)   Resp 16   Ht 1.727 m (5' 8\")   Wt 102.5 kg (225 lb 14.4 oz)   SpO2 98%   BMI 34.35 kg/m    GENERAL: Appears alert and interacting appropriatly. Sitting upright at edge of bed, NAD.  HEENT: Eye symmetrical and free of discharge bilaterally. Mucous membranes moist and without lesions.  NECK: Supple and without lymphadenopathy. JVD at clavicle when sitting upright.  CV: +LVAD hum.  RESPIRATORY: Respirations regular, even, and unlabored. Lungs CTA throughout.   GI: Soft and non distended with normoactive bowel sounds present in all quadrants. No tenderness, rebound, guarding. No organomegaly.   EXTREMITIES: No LE edema. All extremities are warm and well perfused.  NEUROLOGIC: Alert and interacting appropriatly. No focal deficits.   MUSCULOSKELETAL: No joint swelling or tenderness.   SKIN: No jaundice. No rashes or lesions. Driveline covered, dressing c/d/i.    Data:  CMP  Recent Labs   Lab 04/11/21  0708 04/10/21  0824 04/09/21  0622 04/08/21  0551 04/05/21  0536 " 04/05/21  0536    137 137 136   < > 135   POTASSIUM 4.4 4.1 4.0 4.0   < > 3.7   CHLORIDE 103 105 104 102   < > 102   CO2 26 24 26 27   < > 28   ANIONGAP 7 8 7 8   < > 6   * 97 96 89   < > 97   BUN 45* 49* 53* 49*   < > 44*   CR 1.59* 1.58* 1.63* 1.58*   < > 1.91*   GFRESTIMATED 45* 46* 44* 46*   < > 36*   GFRESTBLACK 52* 53* 51* 53*   < > 42*   QAMAR 8.7 8.7 8.8 8.9   < > 8.6   MAG 2.5* 2.5* 2.6* 2.6*   < > 2.5*   PROTTOTAL  --   --   --   --   --  6.4*   ALBUMIN  --   --   --   --   --  3.6   BILITOTAL  --   --   --   --   --  0.5   ALKPHOS  --   --   --   --   --  108   AST  --   --   --   --   --  35   ALT  --   --   --   --   --  40    < > = values in this interval not displayed.     CBC  Recent Labs   Lab 04/08/21  0551 04/05/21  0536   WBC 4.3 3.9*   RBC 4.48 4.12*   HGB 13.5 12.4*   HCT 42.7 38.7*   MCV 95 94   MCH 30.1 30.1   MCHC 31.6 32.0   RDW 15.9* 15.9*    185     INR  Recent Labs   Lab 04/11/21  0708 04/10/21  0824 04/09/21  0622 04/08/21  0551   INR 2.44* 2.58* 2.31* 2.11*       Patient discussed with Dr. Bautista.      Shelia Means, DNP, FNP-BC, CHFN  Advanced Heart Failure Nurse Practitioner  [facility]

## 2021-04-11 NOTE — PLAN OF CARE
D: Admitted s/p HF decompensation, status 2E transplant   [facility] of [facility] EF 20% c/b VT s/p CRT-D s/p HM2 LVAD 6/19/2017, VT, A-Fib, CKD Stage III, COPD, depression     I: Monitored vitals and assessed pt status.     Changed: LVAD Dressing   PRN: Tylenol, Robaxin, Tramadol      A: A0x4. Afebrile. VSS. HRs 60s-70s. Sinus rhythm. Sats >92% on RA. Pt denies any shortness of breath, chest pain/palpitations, nausea, dizziness, and chills. Dyspnea on exertion. Pt states constant shoulder pain, PRN meds given w/some relief. HM2 LVAD. Numbers WNL, no flow alarms during this shift. Dressing changed. BGs q4hrs. Pt on 3g Na+ diet. Pt up independently.      P: Pt is status 2E for heart transplant. Continue to monitor pt status and notify Cards 2 treatment team with any changes or concerns.

## 2021-04-12 LAB
ALBUMIN SERPL-MCNC: 3.7 G/DL (ref 3.4–5)
ALP SERPL-CCNC: 110 U/L (ref 40–150)
ALT SERPL W P-5'-P-CCNC: 37 U/L (ref 0–70)
ANION GAP SERPL CALCULATED.3IONS-SCNC: 7 MMOL/L (ref 3–14)
ANION GAP SERPL CALCULATED.3IONS-SCNC: 8 MMOL/L (ref 3–14)
AST SERPL W P-5'-P-CCNC: 36 U/L (ref 0–45)
BILIRUB DIRECT SERPL-MCNC: 0.2 MG/DL (ref 0–0.2)
BILIRUB SERPL-MCNC: 0.7 MG/DL (ref 0.2–1.3)
BUN SERPL-MCNC: 45 MG/DL (ref 7–30)
BUN SERPL-MCNC: 45 MG/DL (ref 7–30)
CALCIUM SERPL-MCNC: 8.9 MG/DL (ref 8.5–10.1)
CALCIUM SERPL-MCNC: 9 MG/DL (ref 8.5–10.1)
CHLORIDE SERPL-SCNC: 100 MMOL/L (ref 94–109)
CHLORIDE SERPL-SCNC: 102 MMOL/L (ref 94–109)
CO2 SERPL-SCNC: 26 MMOL/L (ref 20–32)
CO2 SERPL-SCNC: 27 MMOL/L (ref 20–32)
CREAT SERPL-MCNC: 1.6 MG/DL (ref 0.66–1.25)
CREAT SERPL-MCNC: 1.7 MG/DL (ref 0.66–1.25)
ERYTHROCYTE [DISTWIDTH] IN BLOOD BY AUTOMATED COUNT: 15.4 % (ref 10–15)
GFR SERPL CREATININE-BSD FRML MDRD: 42 ML/MIN/{1.73_M2}
GFR SERPL CREATININE-BSD FRML MDRD: 45 ML/MIN/{1.73_M2}
GLUCOSE BLDC GLUCOMTR-MCNC: 109 MG/DL (ref 70–99)
GLUCOSE BLDC GLUCOMTR-MCNC: 114 MG/DL (ref 70–99)
GLUCOSE BLDC GLUCOMTR-MCNC: 145 MG/DL (ref 70–99)
GLUCOSE SERPL-MCNC: 114 MG/DL (ref 70–99)
GLUCOSE SERPL-MCNC: 91 MG/DL (ref 70–99)
HCT VFR BLD AUTO: 37.3 % (ref 40–53)
HGB BLD-MCNC: 11.8 G/DL (ref 13.3–17.7)
INR PPP: 2.22 (ref 0.86–1.14)
MAGNESIUM SERPL-MCNC: 2.4 MG/DL (ref 1.6–2.3)
MCH RBC QN AUTO: 29.3 PG (ref 26.5–33)
MCHC RBC AUTO-ENTMCNC: 31.6 G/DL (ref 31.5–36.5)
MCV RBC AUTO: 93 FL (ref 78–100)
PLATELET # BLD AUTO: 171 10E9/L (ref 150–450)
POTASSIUM SERPL-SCNC: 3.7 MMOL/L (ref 3.4–5.3)
POTASSIUM SERPL-SCNC: 3.9 MMOL/L (ref 3.4–5.3)
PROT SERPL-MCNC: 6.7 G/DL (ref 6.8–8.8)
RBC # BLD AUTO: 4.03 10E12/L (ref 4.4–5.9)
SODIUM SERPL-SCNC: 135 MMOL/L (ref 133–144)
SODIUM SERPL-SCNC: 135 MMOL/L (ref 133–144)
WBC # BLD AUTO: 4.5 10E9/L (ref 4–11)

## 2021-04-12 PROCEDURE — 250N000013 HC RX MED GY IP 250 OP 250 PS 637: Performed by: INTERNAL MEDICINE

## 2021-04-12 PROCEDURE — 250N000009 HC RX 250: Performed by: NURSE PRACTITIONER

## 2021-04-12 PROCEDURE — 250N000013 HC RX MED GY IP 250 OP 250 PS 637: Performed by: STUDENT IN AN ORGANIZED HEALTH CARE EDUCATION/TRAINING PROGRAM

## 2021-04-12 PROCEDURE — 85610 PROTHROMBIN TIME: CPT | Performed by: STUDENT IN AN ORGANIZED HEALTH CARE EDUCATION/TRAINING PROGRAM

## 2021-04-12 PROCEDURE — 36415 COLL VENOUS BLD VENIPUNCTURE: CPT | Performed by: STUDENT IN AN ORGANIZED HEALTH CARE EDUCATION/TRAINING PROGRAM

## 2021-04-12 PROCEDURE — 93750 INTERROGATION VAD IN PERSON: CPT | Performed by: NURSE PRACTITIONER

## 2021-04-12 PROCEDURE — 83735 ASSAY OF MAGNESIUM: CPT | Performed by: STUDENT IN AN ORGANIZED HEALTH CARE EDUCATION/TRAINING PROGRAM

## 2021-04-12 PROCEDURE — 999N001017 HC STATISTIC GLUCOSE BY METER IP

## 2021-04-12 PROCEDURE — 36415 COLL VENOUS BLD VENIPUNCTURE: CPT | Performed by: NURSE PRACTITIONER

## 2021-04-12 PROCEDURE — 80076 HEPATIC FUNCTION PANEL: CPT | Performed by: STUDENT IN AN ORGANIZED HEALTH CARE EDUCATION/TRAINING PROGRAM

## 2021-04-12 PROCEDURE — 214N000001 HC R&B CCU UMMC

## 2021-04-12 PROCEDURE — 250N000013 HC RX MED GY IP 250 OP 250 PS 637: Performed by: PHYSICIAN ASSISTANT

## 2021-04-12 PROCEDURE — 80076 HEPATIC FUNCTION PANEL: CPT | Performed by: NURSE PRACTITIONER

## 2021-04-12 PROCEDURE — 250N000013 HC RX MED GY IP 250 OP 250 PS 637: Performed by: NURSE PRACTITIONER

## 2021-04-12 PROCEDURE — 80048 BASIC METABOLIC PNL TOTAL CA: CPT | Performed by: NURSE PRACTITIONER

## 2021-04-12 PROCEDURE — 80048 BASIC METABOLIC PNL TOTAL CA: CPT | Performed by: STUDENT IN AN ORGANIZED HEALTH CARE EDUCATION/TRAINING PROGRAM

## 2021-04-12 PROCEDURE — 85027 COMPLETE CBC AUTOMATED: CPT | Performed by: STUDENT IN AN ORGANIZED HEALTH CARE EDUCATION/TRAINING PROGRAM

## 2021-04-12 PROCEDURE — 99232 SBSQ HOSP IP/OBS MODERATE 35: CPT | Mod: 24 | Performed by: NURSE PRACTITIONER

## 2021-04-12 RX ADMIN — ALBUTEROL SULFATE 2 PUFF: 90 AEROSOL, METERED RESPIRATORY (INHALATION) at 09:40

## 2021-04-12 RX ADMIN — GABAPENTIN 600 MG: 300 CAPSULE ORAL at 21:09

## 2021-04-12 RX ADMIN — ACARBOSE 25 MG: 25 TABLET ORAL at 07:30

## 2021-04-12 RX ADMIN — HYDRALAZINE HYDROCHLORIDE 75 MG: 50 TABLET ORAL at 14:40

## 2021-04-12 RX ADMIN — METHOCARBAMOL 500 MG: 500 TABLET, FILM COATED ORAL at 14:45

## 2021-04-12 RX ADMIN — Medication 50 MG: at 08:53

## 2021-04-12 RX ADMIN — AMIODARONE HYDROCHLORIDE 200 MG: 200 TABLET ORAL at 08:45

## 2021-04-12 RX ADMIN — BUMETANIDE 4 MG: 2 TABLET ORAL at 14:40

## 2021-04-12 RX ADMIN — ACETAMINOPHEN 650 MG: 325 TABLET, FILM COATED ORAL at 08:53

## 2021-04-12 RX ADMIN — GABAPENTIN 300 MG: 300 CAPSULE ORAL at 08:45

## 2021-04-12 RX ADMIN — HYDRALAZINE HYDROCHLORIDE 75 MG: 50 TABLET ORAL at 08:44

## 2021-04-12 RX ADMIN — CAPSAICIN: 0.75 CREAM TOPICAL at 08:54

## 2021-04-12 RX ADMIN — ANAKINRA 100 MG: 100 INJECTION, SOLUTION SUBCUTANEOUS at 10:23

## 2021-04-12 RX ADMIN — ACETAMINOPHEN 650 MG: 325 TABLET, FILM COATED ORAL at 14:45

## 2021-04-12 RX ADMIN — GABAPENTIN 600 MG: 300 CAPSULE ORAL at 14:40

## 2021-04-12 RX ADMIN — ALLOPURINOL 300 MG: 300 TABLET ORAL at 08:45

## 2021-04-12 RX ADMIN — BUPROPION HYDROCHLORIDE 100 MG: 100 TABLET, EXTENDED RELEASE ORAL at 08:45

## 2021-04-12 RX ADMIN — ISOSORBIDE DINITRATE 10 MG: 10 TABLET ORAL at 14:40

## 2021-04-12 RX ADMIN — POTASSIUM CHLORIDE 40 MEQ: 750 TABLET, EXTENDED RELEASE ORAL at 08:45

## 2021-04-12 RX ADMIN — Medication 50 MG: at 14:45

## 2021-04-12 RX ADMIN — DOCUSATE SODIUM 50 MG AND SENNOSIDES 8.6 MG 2 TABLET: 8.6; 5 TABLET, FILM COATED ORAL at 08:53

## 2021-04-12 RX ADMIN — FLUTICASONE FUROATE AND VILANTEROL TRIFENATATE 1 PUFF: 100; 25 POWDER RESPIRATORY (INHALATION) at 08:44

## 2021-04-12 RX ADMIN — WARFARIN SODIUM 9 MG: 4 TABLET ORAL at 18:31

## 2021-04-12 RX ADMIN — CHLOROTHIAZIDE 250 MG: 250 SUSPENSION ORAL at 11:04

## 2021-04-12 RX ADMIN — ISOSORBIDE DINITRATE 10 MG: 10 TABLET ORAL at 08:45

## 2021-04-12 RX ADMIN — METHOCARBAMOL 500 MG: 500 TABLET, FILM COATED ORAL at 08:53

## 2021-04-12 RX ADMIN — ISOSORBIDE DINITRATE 10 MG: 10 TABLET ORAL at 20:37

## 2021-04-12 RX ADMIN — MONTELUKAST 10 MG: 10 TABLET, FILM COATED ORAL at 21:08

## 2021-04-12 RX ADMIN — Medication 10 MG: at 21:08

## 2021-04-12 RX ADMIN — HYDRALAZINE HYDROCHLORIDE 75 MG: 50 TABLET ORAL at 20:37

## 2021-04-12 RX ADMIN — CETIRIZINE HYDROCHLORIDE 10 MG: 10 TABLET, FILM COATED ORAL at 08:45

## 2021-04-12 RX ADMIN — BUMETANIDE 4 MG: 2 TABLET ORAL at 08:44

## 2021-04-12 RX ADMIN — UMECLIDINIUM 1 PUFF: 62.5 AEROSOL, POWDER ORAL at 08:45

## 2021-04-12 RX ADMIN — Medication 25 MG: at 21:08

## 2021-04-12 RX ADMIN — ASPIRIN 81 MG CHEWABLE TABLET 81 MG: 81 TABLET CHEWABLE at 08:45

## 2021-04-12 RX ADMIN — POTASSIUM CHLORIDE 40 MEQ: 750 TABLET, EXTENDED RELEASE ORAL at 20:37

## 2021-04-12 RX ADMIN — DOCUSATE SODIUM 50 MG AND SENNOSIDES 8.6 MG 1 TABLET: 8.6; 5 TABLET, FILM COATED ORAL at 20:37

## 2021-04-12 RX ADMIN — BUPROPION HYDROCHLORIDE 100 MG: 100 TABLET, EXTENDED RELEASE ORAL at 20:37

## 2021-04-12 ASSESSMENT — ACTIVITIES OF DAILY LIVING (ADL)
ADLS_ACUITY_SCORE: 10

## 2021-04-12 ASSESSMENT — MIFFLIN-ST. JEOR: SCORE: 1811.87

## 2021-04-12 NOTE — PROGRESS NOTES
"Transplant Update 04/12/21:  Topic:  Hep C and update    Spoke with patient in regards to his questions about Hep C donation, he has consented to accepting Hep C Ab positive donors.  He had questions in regards to average wait times for transplant as he has met some patients in support group that reported shorter wait times.  Explained the process of allocation based on different matching criteria, including size and blood type.  Also explained that patient specific circumstances are different for each candidate and that is all kept confidential.  He was very engaged, positive and asked appropriate questions during our conversation.  He reports the hospitalization has \"gone faster than expected\" and he remains hopeful, using his willa for strength.  He is in regular contact with his granddaughter and wife, he is looking forward to being with them again after transplant.  Overall, Jim reports feeling optimistic and had no further transplant related questions or concerns.                            "

## 2021-04-12 NOTE — PLAN OF CARE
Pt admitted 2/8 with worsening overall function and worsening HF concern, now status 2E cardiac txp listed.  SR (rarely Paced), VS'S on RA with b/l shoulder pain and medicated with prn Tylenol, Robaxin, and one time dose of Martha HS.  Two intermittent episodes of torsades around 20 beats x 2 at estimated 300 bpm at 2138.  Provider notified and ordered labs and electrolytes WNL.  HM II LVAD numbers WNL and no alarms noted.  Pt glucose 130's HS and no 200 check r/t do not disturb order.  Hep C donor concerns and f/up on Monday per NP note.  Otherwise, pt slept well and up independently.  Continue to monitor and with POC.

## 2021-04-12 NOTE — PLAN OF CARE
D: chronic systolic HF 2/2 NICM s/p HM II LVAD, now listed for transplant-status 2E; Hx of VT, afib, DM2, CKD, COPD.  I/A: A&Ox4, up ad francisco. VSS on room air. LVAD #s WNL, no alarms, dressing CDI. Bilateral shoulder pain- utilizing PRN tylenol, robaxin, and tramadol. Voiding with urinal at bedside, PRN senna given today, with BM this afternoon per patient. Pt would like to take PRN trazodone for sleep tonight.  P: Continue to follow POC and contact Cards 2 with updates/changes.

## 2021-04-12 NOTE — PROGRESS NOTES
[facility]   Cardiology II Service / Advanced Heart Failure  Daily Progress Note  Date of Service: 4/12/2021      Patient: Jim Willingham  MRN: [id number]  Admission Date: 2/8/2021  Hospital Day # 63    Assessment and Plan: Jim Willingham is a 65 year old male with history of NICM EF 20% c/b VT s/p CRT-D s/p HMII LVAD 6/19/2017 originally intended as destination therapy 2/2 obesity however with recent weight loss now candidate for transplantation. He is admitted for worsening functional status and renal function concerning for worsening heart failure. He is now listed status 2E for transplant.     Chronic systolic heart failure secondary to NICM s/p HM II LVAD. Echo 1/8/21 at 9400rpm, EF<30%m LVIDd 6.8cm, at least mildly reduced RV function, AoV closed without AI, mild-mod eccentric MR, dilated IVC without collapse. RHC 3/23 RA 10 mPA 25 mPCWP 14 Kee CO/CI 6.5/3.1.  Stage D, NYHA Class III  ACEi/ARB contraindicated due to renal dysfunction. Isordil 10 mg po TID. Hydralazine increased to 75 mg po TID.   BB Contraindicated due to low cardiac output.   Aldosterone antagonist contraindicated due to renal dysfunction  SCD prophylaxis CRT-D  Fluid status: Mild hypervolemia. Bumex 4 mg po BID. Diuril 250 mg IV times one.   MAP: 66  LDH: 336 4/9/21  Anticoagulation: Coumadin per pharmacy. INR- 2.22, goal 2.5-3, goal increased in setting of power spikes and elevated flows this hospitalization.   Antiplatelet: ASA 81 mg po daily  - remains on the cardiac transplant list status 2E. Persistent escalation of diuretics in setting of progressive heart failure with refractory hypervolemia.   - Encouraged activity.      The patient's HeartMate LVAD was interrogated 4/12/2021  * Speed 9600 rpm   * Pulsatility index 3.8  * Power 7.2 Manuel   * Flow 7 L/minute   Fluid status: mild hypervolemia.   Alarms were reviewed, and notable for PI events.   All external components were inspected and showed no evidence of damage or  malfunction.     WALTER on CKD Stage III. Cr peaked at 2.22.  - Cr- 1.60 (1.71).     History of Afib. History of VT/VF. Asymptomatic bursts of questionable AF vs NSVT without hemodynamic compromise. RHC 3/23 RA 10 mPA 25 mPCWP 14 Kee CO/CI 6.5/3.1. CHADSVASC-5.   - Continue Amiodarone 200 mg po daily.   - Coumadin as above.   - 17 beat run of VT last HS. Diuresis above.      DM type II, controlled. Symptomatic Hypoglycemia, resolved. Low Cortisol: Hgb A1C-6.2. Lantus and Novololg sliding scale insulin discontinued. Endo consult appreciated. C-peptide 13.9 an Proinsulin 18.4. Serum cortisol 7.7 3/6/21, cosyntropin stim test WNL per Endo.   -Per endo --> Fingerstick glucose might not be accurate for hypoglycemia, if patient has glucose <55 (without insulin), plasma glucose should be sent for confirmation of hypoglycemia prior to correction. While the patient is off insulin therapy, POC glucose readings above 60 are acceptable for the patient  - postprandial hypoglycemia improved with Acarbose 25 mg po daily.      Chronic/resolved conditions  COPD/Asthma: pta Breo Ellipta, albuterol prn.   Depression: pta Bupropion  Right sided weakness, resolved. CT head negative for acute findings. Appreciate Neuro eval.   Staph Hominis Bacteremia. No need for surveillance blood cx per transplant ID.    Rhinovirus, resolved. Bacterial bronchitis. Completed 5 day course of Cefdinir. COVID negative 2/8. Resp PCR +rhinovirus. CXR 2/12 with no overt PNA. Stopped cefdinir 2/15. Cleared for transplant per ID. Repeat RVP negative 3/5. COVID repeated due to sinus congestion, negative. Congestion improved with transition from Claritin to Zyrtec.   Carpel tunnel, bilateral s/p bilateral release. Evidence of thenar atrophy. Relief with steroid injection 11/20. Carpal tunnel release 2/18/21. Appreciate Ortho/Plastics consult, signed off. Continue Tylenol 650 mg po QID and Gabapentin 300 mg in AM, 600 mg in the afternoon, and 600 mg in the  "evening.      FEN: 3 gram sodium diet   PROPHY:  Coumadin  LINES:  PIV   DISPO:  TBD pending cardiac transplant.   CODE STATUS: Full Code   ================================================================  Interval History/ROS: He complains of RESENDIZ, abdominal distention, and mild LE edema. He denies fever, chills, chest pain, palpitations, cough, nausea, vomiting, diarrhea, melena, hematochezia, and concerns at drive line site. He is tolerating oral intake and ambulation.     Last 24 hr care team notes reviewed.   ROS:  4 point ROS including Respiratory, CV, GI and , other than that noted in the HPI, is negative.     Medications: Reviewed in EPIC.     Physical Exam:   BP 92/78 (BP Location: Left arm)   Pulse 73   Temp 98  F (36.7  C) (Oral)   Resp 18   Ht 1.727 m (5' 8\")   Wt 104.2 kg (229 lb 12.8 oz)   SpO2 100%   BMI 34.94 kg/m    GENERAL: Appears alert and oriented times three.   HEENT: Eye symmetrical and free of discharge bilaterally. Mucous membranes moist and without lesions.  NECK: Supple and without lymphadenopathy. JVD 10-12 cm.   CV: RRR, S1S2 present with LVAD hum.   RESPIRATORY: Respirations regular, even, and unlabored. Lungs CTA throughout.   GI: Soft and mildly distended with normoactive bowel sounds present in all quadrants. No tenderness, rebound, guarding. No organomegaly.   EXTREMITIES: +1 bilateral LE peripheral edema. 2+ bilateral pedal pulses.   NEUROLOGIC: Alert and orientated x 3. CN II-XII grossly intact. No focal deficits.   MUSCULOSKELETAL: No joint swelling or tenderness.   SKIN: No jaundice. No rashes or lesions. LVAD drive line site covered.     Data:  CMP  Recent Labs   Lab 04/12/21  0601 04/11/21  2206 04/11/21  0708 04/10/21  0824    132* 135 137   POTASSIUM 3.9 4.1 4.4 4.1   CHLORIDE 102 100 103 105   CO2 26 28 26 24   ANIONGAP 7 4 7 8   GLC 91 139* 108* 97   BUN 45* 47* 45* 49*   CR 1.60* 1.63* 1.59* 1.58*   GFRESTIMATED 45* 44* 45* 46*   GFRESTBLACK 52* 51* 52* 53* "   QAMAR 8.9 8.3* 8.7 8.7   MAG 2.4* 2.4* 2.5* 2.5*   PROTTOTAL 6.7*  --   --   --    ALBUMIN 3.7  --   --   --    BILITOTAL 0.7  --   --   --    ALKPHOS 110  --   --   --    AST 36  --   --   --    ALT 37  --   --   --      CBC  Recent Labs   Lab 04/12/21  0601 04/08/21  0551   WBC 4.5 4.3   RBC 4.03* 4.48   HGB 11.8* 13.5   HCT 37.3* 42.7   MCV 93 95   MCH 29.3 30.1   MCHC 31.6 31.6   RDW 15.4* 15.9*    205     INR  Recent Labs   Lab 04/12/21  0601 04/11/21  0708 04/10/21  0824 04/09/21  0622   INR 2.22* 2.44* 2.58* 2.31*       Patient discussed with Dr. Bautista.      Soraya Marshall [facility]  4/12/2021

## 2021-04-13 ENCOUNTER — APPOINTMENT (OUTPATIENT)
Dept: OCCUPATIONAL THERAPY | Facility: CLINIC | Age: 66
DRG: 001 | End: 2021-04-13
Attending: INTERNAL MEDICINE
Payer: COMMERCIAL

## 2021-04-13 LAB
ANION GAP SERPL CALCULATED.3IONS-SCNC: 9 MMOL/L (ref 3–14)
BUN SERPL-MCNC: 45 MG/DL (ref 7–30)
CALCIUM SERPL-MCNC: 9 MG/DL (ref 8.5–10.1)
CHLORIDE SERPL-SCNC: 102 MMOL/L (ref 94–109)
CO2 SERPL-SCNC: 25 MMOL/L (ref 20–32)
CREAT SERPL-MCNC: 1.72 MG/DL (ref 0.66–1.25)
GFR SERPL CREATININE-BSD FRML MDRD: 41 ML/MIN/{1.73_M2}
GLUCOSE BLDC GLUCOMTR-MCNC: 106 MG/DL (ref 70–99)
GLUCOSE BLDC GLUCOMTR-MCNC: 249 MG/DL (ref 70–99)
GLUCOSE BLDC GLUCOMTR-MCNC: 51 MG/DL (ref 70–99)
GLUCOSE BLDC GLUCOMTR-MCNC: 57 MG/DL (ref 70–99)
GLUCOSE BLDC GLUCOMTR-MCNC: 82 MG/DL (ref 70–99)
GLUCOSE BLDC GLUCOMTR-MCNC: 89 MG/DL (ref 70–99)
GLUCOSE SERPL-MCNC: 96 MG/DL (ref 70–99)
HBV SURFACE AG SERPL QL IA: NONREACTIVE
HIV 1+2 AB+HIV1 P24 AG SERPL QL IA: NONREACTIVE
HIV EXPOSURE DRAW AND HOLD: NORMAL
HIV1+2 AB SPEC QL IA.RAPID: NONREACTIVE
INR PPP: 1.88 (ref 0.86–1.14)
MAGNESIUM SERPL-MCNC: 2.5 MG/DL (ref 1.6–2.3)
POTASSIUM SERPL-SCNC: 3.9 MMOL/L (ref 3.4–5.3)
SODIUM SERPL-SCNC: 136 MMOL/L (ref 133–144)

## 2021-04-13 PROCEDURE — 250N000013 HC RX MED GY IP 250 OP 250 PS 637: Performed by: INTERNAL MEDICINE

## 2021-04-13 PROCEDURE — 999N001017 HC STATISTIC GLUCOSE BY METER IP

## 2021-04-13 PROCEDURE — 250N000013 HC RX MED GY IP 250 OP 250 PS 637: Performed by: STUDENT IN AN ORGANIZED HEALTH CARE EDUCATION/TRAINING PROGRAM

## 2021-04-13 PROCEDURE — 97110 THERAPEUTIC EXERCISES: CPT | Mod: GO

## 2021-04-13 PROCEDURE — 80048 BASIC METABOLIC PNL TOTAL CA: CPT | Performed by: STUDENT IN AN ORGANIZED HEALTH CARE EDUCATION/TRAINING PROGRAM

## 2021-04-13 PROCEDURE — 83735 ASSAY OF MAGNESIUM: CPT | Performed by: STUDENT IN AN ORGANIZED HEALTH CARE EDUCATION/TRAINING PROGRAM

## 2021-04-13 PROCEDURE — 214N000001 HC R&B CCU UMMC

## 2021-04-13 PROCEDURE — 250N000013 HC RX MED GY IP 250 OP 250 PS 637: Performed by: NURSE PRACTITIONER

## 2021-04-13 PROCEDURE — 85610 PROTHROMBIN TIME: CPT | Performed by: STUDENT IN AN ORGANIZED HEALTH CARE EDUCATION/TRAINING PROGRAM

## 2021-04-13 PROCEDURE — 250N000009 HC RX 250: Performed by: NURSE PRACTITIONER

## 2021-04-13 PROCEDURE — 99232 SBSQ HOSP IP/OBS MODERATE 35: CPT | Mod: 24 | Performed by: NURSE PRACTITIONER

## 2021-04-13 PROCEDURE — 36415 COLL VENOUS BLD VENIPUNCTURE: CPT | Performed by: STUDENT IN AN ORGANIZED HEALTH CARE EDUCATION/TRAINING PROGRAM

## 2021-04-13 PROCEDURE — 250N000013 HC RX MED GY IP 250 OP 250 PS 637: Performed by: PHYSICIAN ASSISTANT

## 2021-04-13 PROCEDURE — 93750 INTERROGATION VAD IN PERSON: CPT | Performed by: NURSE PRACTITIONER

## 2021-04-13 RX ORDER — POTASSIUM CHLORIDE 1.5 G/1.58G
20 POWDER, FOR SOLUTION ORAL ONCE
Status: DISCONTINUED | OUTPATIENT
Start: 2021-04-13 | End: 2021-04-14

## 2021-04-13 RX ADMIN — Medication: at 08:00

## 2021-04-13 RX ADMIN — ALLOPURINOL 300 MG: 300 TABLET ORAL at 08:08

## 2021-04-13 RX ADMIN — Medication 25 MG: at 21:14

## 2021-04-13 RX ADMIN — ASPIRIN 81 MG CHEWABLE TABLET 81 MG: 81 TABLET CHEWABLE at 08:07

## 2021-04-13 RX ADMIN — BUMETANIDE 4 MG: 2 TABLET ORAL at 14:15

## 2021-04-13 RX ADMIN — DOCUSATE SODIUM 50 MG AND SENNOSIDES 8.6 MG 2 TABLET: 8.6; 5 TABLET, FILM COATED ORAL at 21:15

## 2021-04-13 RX ADMIN — ACETAMINOPHEN 650 MG: 325 TABLET, FILM COATED ORAL at 08:22

## 2021-04-13 RX ADMIN — ACARBOSE 25 MG: 25 TABLET ORAL at 06:23

## 2021-04-13 RX ADMIN — GABAPENTIN 300 MG: 300 CAPSULE ORAL at 08:06

## 2021-04-13 RX ADMIN — Medication 10 MG: at 21:15

## 2021-04-13 RX ADMIN — MONTELUKAST 10 MG: 10 TABLET, FILM COATED ORAL at 21:16

## 2021-04-13 RX ADMIN — POTASSIUM CHLORIDE 40 MEQ: 750 TABLET, EXTENDED RELEASE ORAL at 08:07

## 2021-04-13 RX ADMIN — WARFARIN SODIUM 11 MG: 10 TABLET ORAL at 18:04

## 2021-04-13 RX ADMIN — HYDRALAZINE HYDROCHLORIDE 75 MG: 50 TABLET ORAL at 21:15

## 2021-04-13 RX ADMIN — HYDRALAZINE HYDROCHLORIDE 75 MG: 50 TABLET ORAL at 08:06

## 2021-04-13 RX ADMIN — AMIODARONE HYDROCHLORIDE 200 MG: 200 TABLET ORAL at 08:08

## 2021-04-13 RX ADMIN — BUPROPION HYDROCHLORIDE 100 MG: 100 TABLET, EXTENDED RELEASE ORAL at 08:08

## 2021-04-13 RX ADMIN — FLUTICASONE FUROATE AND VILANTEROL TRIFENATATE 1 PUFF: 100; 25 POWDER RESPIRATORY (INHALATION) at 08:05

## 2021-04-13 RX ADMIN — ACETAMINOPHEN 650 MG: 325 TABLET, FILM COATED ORAL at 21:15

## 2021-04-13 RX ADMIN — Medication 50 MG: at 08:22

## 2021-04-13 RX ADMIN — BUMETANIDE 4 MG: 2 TABLET ORAL at 08:07

## 2021-04-13 RX ADMIN — POTASSIUM CHLORIDE 40 MEQ: 750 TABLET, EXTENDED RELEASE ORAL at 21:15

## 2021-04-13 RX ADMIN — ISOSORBIDE DINITRATE 10 MG: 10 TABLET ORAL at 21:16

## 2021-04-13 RX ADMIN — BUPROPION HYDROCHLORIDE 100 MG: 100 TABLET, EXTENDED RELEASE ORAL at 21:15

## 2021-04-13 RX ADMIN — Medication 50 MG: at 14:15

## 2021-04-13 RX ADMIN — HYDRALAZINE HYDROCHLORIDE 75 MG: 50 TABLET ORAL at 14:15

## 2021-04-13 RX ADMIN — GABAPENTIN 600 MG: 300 CAPSULE ORAL at 21:15

## 2021-04-13 RX ADMIN — ACETAMINOPHEN 650 MG: 325 TABLET, FILM COATED ORAL at 13:37

## 2021-04-13 RX ADMIN — CAPSAICIN: 0.75 CREAM TOPICAL at 21:24

## 2021-04-13 RX ADMIN — METHOCARBAMOL 500 MG: 500 TABLET, FILM COATED ORAL at 21:16

## 2021-04-13 RX ADMIN — METHOCARBAMOL 500 MG: 500 TABLET, FILM COATED ORAL at 08:22

## 2021-04-13 RX ADMIN — ISOSORBIDE DINITRATE 10 MG: 10 TABLET ORAL at 14:15

## 2021-04-13 RX ADMIN — GABAPENTIN 600 MG: 300 CAPSULE ORAL at 14:15

## 2021-04-13 RX ADMIN — METHOCARBAMOL 500 MG: 500 TABLET, FILM COATED ORAL at 14:15

## 2021-04-13 RX ADMIN — Medication 50 MG: at 21:15

## 2021-04-13 RX ADMIN — ANAKINRA 100 MG: 100 INJECTION, SOLUTION SUBCUTANEOUS at 08:05

## 2021-04-13 RX ADMIN — ISOSORBIDE DINITRATE 10 MG: 10 TABLET ORAL at 08:07

## 2021-04-13 RX ADMIN — UMECLIDINIUM 1 PUFF: 62.5 AEROSOL, POWDER ORAL at 08:06

## 2021-04-13 RX ADMIN — CETIRIZINE HYDROCHLORIDE 10 MG: 10 TABLET, FILM COATED ORAL at 08:08

## 2021-04-13 ASSESSMENT — ACTIVITIES OF DAILY LIVING (ADL)
ADLS_ACUITY_SCORE: 10
ADLS_ACUITY_SCORE: 10
ADLS_ACUITY_SCORE: 12
ADLS_ACUITY_SCORE: 10
ADLS_ACUITY_SCORE: 12
ADLS_ACUITY_SCORE: 12

## 2021-04-13 ASSESSMENT — MIFFLIN-ST. JEOR: SCORE: 1772.4

## 2021-04-13 NOTE — PLAN OF CARE
D: Pt admit 2/8/21 awaiting heart transplant status 2E. PMH NICM EF 20% c/b VT s/p CRT-D s/p HM2 LVAD 6/19/2017 originally DT 2/2 obesity but due to recent weight loss pt candidate for transplantation    I/A:   Neuro: A&Ox4. DND order in place [id number]  VS: VSS. RA  LVAD #'s WNL, no alarms this shift. Dressing CDI  Tele: SR 1st degree AVB  Pain: pt c/o shoulder pain controlled with icy hot cream. Pt declined analgesics overnight.   GI/: Urinating adequately into bedside urinal. No BM this shift.  Diet: 3g Na  BG/insulin: BG checks q4hr. Pt declined checks overnight so check done at HS and again with AM labs  IV/Drips: R PIV SL  Activity: independent  Skin/drains: WDL    P: Plan to Continue to monitor pt status and report changes to Cards 2.     Erna Rodriguez RN

## 2021-04-13 NOTE — PROGRESS NOTES
[facility]   Cardiology II Service / Advanced Heart Failure  Daily Progress Note  Date of Service: 4/13/2021      Patient: Jim Willingham  MRN: [id number]  Admission Date: 2/8/2021  Hospital Day # 64       Assessment and Plan: Jim Willingham is a 65 year old male with history of NICM EF 20% c/b VT s/p CRT-D s/p HMII LVAD 6/19/2017 originally intended as destination therapy 2/2 obesity however with recent weight loss now candidate for transplantation. He is admitted for worsening functional status and renal function concerning for worsening heart failure. He is now listed status 2E for transplant.     Chronic systolic heart failure secondary to NICM s/p HM II LVAD. Echo 1/8/21 at 9400 rpm, EF<30%m LVIDd 6.8cm, at least mildly reduced RV function, AoV closed without AI, mild-mod eccentric MR, dilated IVC without collapse. RHC 3/23 RA 10 mPA 25 mPCWP 14 Kee CO/CI 6.5/3.1.  Stage D, NYHA Class III  ACEi/ARB contraindicated due to renal dysfunction. Isordil 10 mg po TID. Hydralazine increased to 75 mg po TID.   BB Contraindicated due to low cardiac output.   Aldosterone antagonist contraindicated due to renal dysfunction  SCD prophylaxis CRT-D  Fluid status: Mild hypervolemia. Bumex 4 mg po BID.   MAP: 78  LDH: 336 4/9/21  Anticoagulation: Coumadin per pharmacy. INR- 1.88, goal 2.5-3, goal increased in setting of power spikes and elevated flows this hospitalization. Will give higher dose of Coumadin today.   Antiplatelet: ASA 81 mg po daily  - remains on the cardiac transplant list status 2E. Persistent escalation of diuretics in setting of progressive heart failure with refractory hypervolemia.   - Encouraged activity.      The patient's HeartMate LVAD was interrogated 4/13/2021  * Speed 9600 rpm   * Pulsatility index 4.3  * Power 6.7 Manuel   * Flow 6.2 L/minute   Fluid status: mild hypervolemia.   Alarms were reviewed, and notable for PI events.   All external components were inspected and showed no  evidence of damage or malfunction.     WALTER on CKD Stage III. Cr peaked at 2.22.  - Cr- 1.72 (1.6).     History of Afib. History of VT/VF. Asymptomatic bursts of questionable AF vs NSVT without hemodynamic compromise. RHC 3/23 RA 10 mPA 25 mPCWP 14 Kee CO/CI 6.5/3.1. CHADSVASC-5.   - Continue Amiodarone 200 mg po daily.   - Coumadin as above.      DM type II, controlled. Symptomatic Hypoglycemia, resolved. Low Cortisol: Hgb A1C-6.2. Lantus and Novololg sliding scale insulin discontinued. Endo consult appreciated. C-peptide 13.9 an Proinsulin 18.4. Serum cortisol 7.7 3/6/21, cosyntropin stim test WNL per Endo.   -Per endo --> Fingerstick glucose might not be accurate for hypoglycemia, if patient has glucose <55 (without insulin), plasma glucose should be sent for confirmation of hypoglycemia prior to correction. While the patient is off insulin therapy, POC glucose readings above 60 are acceptable for the patient  - postprandial hypoglycemia this AM. Endo notified, continue Acarbose 25 mg po daily.      Chronic/resolved conditions  COPD/Asthma: pta Breo Ellipta, albuterol prn.   Depression: pta Bupropion  Right sided weakness, resolved. CT head negative for acute findings. Appreciate Neuro eval.   Staph Hominis Bacteremia. No need for surveillance blood cx per transplant ID.    Rhinovirus, resolved. Bacterial bronchitis. Completed 5 day course of Cefdinir. COVID negative 2/8. Resp PCR +rhinovirus. CXR 2/12 with no overt PNA. Stopped cefdinir 2/15. Cleared for transplant per ID. Repeat RVP negative 3/5. COVID repeated due to sinus congestion, negative. Congestion improved with transition from Claritin to Zyrtec.   Carpel tunnel, bilateral s/p bilateral release. Evidence of thenar atrophy. Relief with steroid injection 11/20. Carpal tunnel release 2/18/21. Appreciate Ortho/Plastics consult, signed off. Continue Tylenol 650 mg po QID and Gabapentin 300 mg in AM, 600 mg in the afternoon, and 600 mg in the  "evening.      FEN: 3 gram sodium diet   PROPHY:  Coumadin  LINES:  PIV   DISPO:  TBD pending cardiac transplant.   CODE STATUS: Full Code   ================================================================  Interval History/ROS: He notes mild RESENDIZ this AM. He denies fever, chills, chest pain, palpitations, cough, nausea, vomiting, diarrhea, melena, hematochezia, and LE edema. He is tolerating oral intake well and notes fatigue this AM following his low BG. He is tolerating activity.     Last 24 hr care team notes reviewed.   ROS:  4 point ROS including Respiratory, CV, GI and , other than that noted in the HPI, is negative.     Medications: Reviewed in EPIC.     Physical Exam:   BP (!) 86/60 (BP Location: Right arm)   Pulse 80   Temp 98.3  F (36.8  C) (Oral)   Resp 18   Ht 1.727 m (5' 8\")   Wt 100.3 kg (221 lb 1.6 oz)   SpO2 95%   BMI 33.62 kg/m    GENERAL: Appears alert and oriented times three.   HEENT: Eye symmetrical and free of discharge bilaterally. Mucous membranes moist and without lesions.  NECK: Supple and without lymphadenopathy. JVD 10 cm.   CV: RRR, S1S2 present with LVAD hum.   RESPIRATORY: Respirations regular, even, and unlabored. Lungs CTA throughout.   GI: Soft and non distended with normoactive bowel sounds present in all quadrants. No tenderness, rebound, guarding. No organomegaly.   EXTREMITIES: Trace bilateral LE peripheral edema. 2+ bilateral pedal pulses.   NEUROLOGIC: Alert and orientated x 3. CN II-XII grossly intact. No focal deficits.   MUSCULOSKELETAL: No joint swelling or tenderness.   SKIN: No jaundice. No rashes or lesions. LVAD drive line covered.     Data:  CMP  Recent Labs   Lab 04/13/21  0609 04/12/21  1855 04/12/21  0601 04/11/21  2206 04/11/21  0708    135 135 132* 135   POTASSIUM 3.9 3.7 3.9 4.1 4.4   CHLORIDE 102 100 102 100 103   CO2 25 27 26 28 26   ANIONGAP 9 8 7 4 7   GLC 96 114* 91 139* 108*   BUN 45* 45* 45* 47* 45*   CR 1.72* 1.70* 1.60* 1.63* 1.59* "   GFRESTIMATED 41* 42* 45* 44* 45*   GFRESTBLACK 48* 48* 52* 51* 52*   QAMAR 9.0 9.0 8.9 8.3* 8.7   MAG 2.5*  --  2.4* 2.4* 2.5*   PROTTOTAL  --   --  6.7*  --   --    ALBUMIN  --   --  3.7  --   --    BILITOTAL  --   --  0.7  --   --    ALKPHOS  --   --  110  --   --    AST  --   --  36  --   --    ALT  --   --  37  --   --      CBC  Recent Labs   Lab 04/12/21  0601 04/08/21  0551   WBC 4.5 4.3   RBC 4.03* 4.48   HGB 11.8* 13.5   HCT 37.3* 42.7   MCV 93 95   MCH 29.3 30.1   MCHC 31.6 31.6   RDW 15.4* 15.9*    205     INR  Recent Labs   Lab 04/13/21  0609 04/12/21  0601 04/11/21  0708 04/10/21  0824   INR 1.88* 2.22* 2.44* 2.58*       Patient discussed with Dr. Bautista.      Soraya Marshall [facility]  4/13/2021

## 2021-04-13 NOTE — PROGRESS NOTES
Pt's BG 51 @ 1017 today (asymptomatic), re-check BG 57 after 120 mL orange juice. Patient refused glucose gel. Patient had another 120 mL orange juice and re-check BG 82 @ 1036. Patient up in chair eating lunch @ 1125.

## 2021-04-14 ENCOUNTER — APPOINTMENT (OUTPATIENT)
Dept: PHYSICAL THERAPY | Facility: CLINIC | Age: 66
DRG: 001 | End: 2021-04-14
Attending: INTERNAL MEDICINE
Payer: COMMERCIAL

## 2021-04-14 ENCOUNTER — TELEPHONE (OUTPATIENT)
Dept: TRANSPLANT | Facility: CLINIC | Age: 66
End: 2021-04-14

## 2021-04-14 ENCOUNTER — APPOINTMENT (OUTPATIENT)
Dept: GENERAL RADIOLOGY | Facility: CLINIC | Age: 66
DRG: 001 | End: 2021-04-14
Attending: NURSE PRACTITIONER
Payer: COMMERCIAL

## 2021-04-14 VITALS — WEIGHT: 223 LBS | BODY MASS INDEX: 33.91 KG/M2

## 2021-04-14 LAB
ANION GAP SERPL CALCULATED.3IONS-SCNC: 8 MMOL/L (ref 3–14)
BUN SERPL-MCNC: 46 MG/DL (ref 7–30)
CALCIUM SERPL-MCNC: 8.7 MG/DL (ref 8.5–10.1)
CHLORIDE SERPL-SCNC: 100 MMOL/L (ref 94–109)
CO2 SERPL-SCNC: 26 MMOL/L (ref 20–32)
CREAT SERPL-MCNC: 1.74 MG/DL (ref 0.66–1.25)
GFR SERPL CREATININE-BSD FRML MDRD: 41 ML/MIN/{1.73_M2}
GLUCOSE BLDC GLUCOMTR-MCNC: 108 MG/DL (ref 70–99)
GLUCOSE BLDC GLUCOMTR-MCNC: 110 MG/DL (ref 70–99)
GLUCOSE BLDC GLUCOMTR-MCNC: 127 MG/DL (ref 70–99)
GLUCOSE BLDC GLUCOMTR-MCNC: 52 MG/DL (ref 70–99)
GLUCOSE BLDC GLUCOMTR-MCNC: 96 MG/DL (ref 70–99)
GLUCOSE SERPL-MCNC: 83 MG/DL (ref 70–99)
HCV RNA SERPL NAA+PROBE-ACNC: NORMAL [IU]/ML
HCV RNA SERPL NAA+PROBE-LOG IU: NORMAL LOG IU/ML
INR PPP: 2.03 (ref 0.86–1.14)
MAGNESIUM SERPL-MCNC: 2.5 MG/DL (ref 1.6–2.3)
POTASSIUM SERPL-SCNC: 4.1 MMOL/L (ref 3.4–5.3)
SODIUM SERPL-SCNC: 134 MMOL/L (ref 133–144)

## 2021-04-14 PROCEDURE — 214N000001 HC R&B CCU UMMC

## 2021-04-14 PROCEDURE — 80048 BASIC METABOLIC PNL TOTAL CA: CPT | Performed by: STUDENT IN AN ORGANIZED HEALTH CARE EDUCATION/TRAINING PROGRAM

## 2021-04-14 PROCEDURE — 250N000013 HC RX MED GY IP 250 OP 250 PS 637: Performed by: PHYSICIAN ASSISTANT

## 2021-04-14 PROCEDURE — 999N001017 HC STATISTIC GLUCOSE BY METER IP

## 2021-04-14 PROCEDURE — 93750 INTERROGATION VAD IN PERSON: CPT | Performed by: NURSE PRACTITIONER

## 2021-04-14 PROCEDURE — 73030 X-RAY EXAM OF SHOULDER: CPT | Mod: 26 | Performed by: RADIOLOGY

## 2021-04-14 PROCEDURE — 250N000013 HC RX MED GY IP 250 OP 250 PS 637: Performed by: NURSE PRACTITIONER

## 2021-04-14 PROCEDURE — 99232 SBSQ HOSP IP/OBS MODERATE 35: CPT | Mod: 24 | Performed by: NURSE PRACTITIONER

## 2021-04-14 PROCEDURE — 36415 COLL VENOUS BLD VENIPUNCTURE: CPT | Performed by: STUDENT IN AN ORGANIZED HEALTH CARE EDUCATION/TRAINING PROGRAM

## 2021-04-14 PROCEDURE — 250N000013 HC RX MED GY IP 250 OP 250 PS 637: Performed by: STUDENT IN AN ORGANIZED HEALTH CARE EDUCATION/TRAINING PROGRAM

## 2021-04-14 PROCEDURE — 250N000013 HC RX MED GY IP 250 OP 250 PS 637: Performed by: INTERNAL MEDICINE

## 2021-04-14 PROCEDURE — 250N000009 HC RX 250: Performed by: NURSE PRACTITIONER

## 2021-04-14 PROCEDURE — 83735 ASSAY OF MAGNESIUM: CPT | Performed by: STUDENT IN AN ORGANIZED HEALTH CARE EDUCATION/TRAINING PROGRAM

## 2021-04-14 PROCEDURE — 85610 PROTHROMBIN TIME: CPT | Performed by: STUDENT IN AN ORGANIZED HEALTH CARE EDUCATION/TRAINING PROGRAM

## 2021-04-14 PROCEDURE — 73030 X-RAY EXAM OF SHOULDER: CPT | Mod: RT

## 2021-04-14 RX ADMIN — MONTELUKAST 10 MG: 10 TABLET, FILM COATED ORAL at 22:58

## 2021-04-14 RX ADMIN — BUMETANIDE 4 MG: 2 TABLET ORAL at 08:48

## 2021-04-14 RX ADMIN — Medication 50 MG: at 22:58

## 2021-04-14 RX ADMIN — CAPSAICIN: 0.75 CREAM TOPICAL at 08:52

## 2021-04-14 RX ADMIN — POTASSIUM CHLORIDE 40 MEQ: 750 TABLET, EXTENDED RELEASE ORAL at 08:48

## 2021-04-14 RX ADMIN — CAPSAICIN: 0.75 CREAM TOPICAL at 19:47

## 2021-04-14 RX ADMIN — METHOCARBAMOL 500 MG: 500 TABLET, FILM COATED ORAL at 04:25

## 2021-04-14 RX ADMIN — Medication 10 MG: at 22:58

## 2021-04-14 RX ADMIN — FLUTICASONE FUROATE AND VILANTEROL TRIFENATATE 1 PUFF: 100; 25 POWDER RESPIRATORY (INHALATION) at 08:49

## 2021-04-14 RX ADMIN — GABAPENTIN 600 MG: 300 CAPSULE ORAL at 14:38

## 2021-04-14 RX ADMIN — ALLOPURINOL 300 MG: 300 TABLET ORAL at 08:48

## 2021-04-14 RX ADMIN — ACETAMINOPHEN 650 MG: 325 TABLET, FILM COATED ORAL at 04:25

## 2021-04-14 RX ADMIN — Medication 50 MG: at 04:25

## 2021-04-14 RX ADMIN — Medication 25 MG: at 22:58

## 2021-04-14 RX ADMIN — DOCUSATE SODIUM 50 MG AND SENNOSIDES 8.6 MG 2 TABLET: 8.6; 5 TABLET, FILM COATED ORAL at 19:46

## 2021-04-14 RX ADMIN — CETIRIZINE HYDROCHLORIDE 10 MG: 10 TABLET, FILM COATED ORAL at 08:48

## 2021-04-14 RX ADMIN — Medication 50 MG: at 11:05

## 2021-04-14 RX ADMIN — BUPROPION HYDROCHLORIDE 100 MG: 100 TABLET, EXTENDED RELEASE ORAL at 08:48

## 2021-04-14 RX ADMIN — Medication 50 MG: at 17:01

## 2021-04-14 RX ADMIN — HYDRALAZINE HYDROCHLORIDE 75 MG: 50 TABLET ORAL at 14:38

## 2021-04-14 RX ADMIN — ACETAMINOPHEN 650 MG: 325 TABLET, FILM COATED ORAL at 14:38

## 2021-04-14 RX ADMIN — METHOCARBAMOL 500 MG: 500 TABLET, FILM COATED ORAL at 17:01

## 2021-04-14 RX ADMIN — WARFARIN SODIUM 11 MG: 10 TABLET ORAL at 18:06

## 2021-04-14 RX ADMIN — BUMETANIDE 4 MG: 2 TABLET ORAL at 14:38

## 2021-04-14 RX ADMIN — GABAPENTIN 300 MG: 300 CAPSULE ORAL at 08:48

## 2021-04-14 RX ADMIN — ISOSORBIDE DINITRATE 10 MG: 10 TABLET ORAL at 19:47

## 2021-04-14 RX ADMIN — ACARBOSE 25 MG: 25 TABLET ORAL at 08:48

## 2021-04-14 RX ADMIN — METHOCARBAMOL 500 MG: 500 TABLET, FILM COATED ORAL at 22:58

## 2021-04-14 RX ADMIN — HYDRALAZINE HYDROCHLORIDE 75 MG: 50 TABLET ORAL at 08:48

## 2021-04-14 RX ADMIN — BUPROPION HYDROCHLORIDE 100 MG: 100 TABLET, EXTENDED RELEASE ORAL at 19:46

## 2021-04-14 RX ADMIN — METHOCARBAMOL 500 MG: 500 TABLET, FILM COATED ORAL at 11:05

## 2021-04-14 RX ADMIN — POTASSIUM CHLORIDE 40 MEQ: 750 TABLET, EXTENDED RELEASE ORAL at 19:46

## 2021-04-14 RX ADMIN — UMECLIDINIUM 1 PUFF: 62.5 AEROSOL, POWDER ORAL at 08:49

## 2021-04-14 RX ADMIN — GABAPENTIN 600 MG: 300 CAPSULE ORAL at 22:58

## 2021-04-14 RX ADMIN — ANAKINRA 100 MG: 100 INJECTION, SOLUTION SUBCUTANEOUS at 08:49

## 2021-04-14 RX ADMIN — ISOSORBIDE DINITRATE 10 MG: 10 TABLET ORAL at 14:38

## 2021-04-14 RX ADMIN — HYDRALAZINE HYDROCHLORIDE 75 MG: 50 TABLET ORAL at 19:46

## 2021-04-14 RX ADMIN — ACETAMINOPHEN 650 MG: 325 TABLET, FILM COATED ORAL at 22:58

## 2021-04-14 RX ADMIN — ACETAMINOPHEN 650 MG: 325 TABLET, FILM COATED ORAL at 08:47

## 2021-04-14 RX ADMIN — ISOSORBIDE DINITRATE 10 MG: 10 TABLET ORAL at 08:48

## 2021-04-14 RX ADMIN — ASPIRIN 81 MG CHEWABLE TABLET 81 MG: 81 TABLET CHEWABLE at 08:48

## 2021-04-14 RX ADMIN — AMIODARONE HYDROCHLORIDE 200 MG: 200 TABLET ORAL at 08:48

## 2021-04-14 ASSESSMENT — ACTIVITIES OF DAILY LIVING (ADL)
ADLS_ACUITY_SCORE: 12

## 2021-04-14 ASSESSMENT — MIFFLIN-ST. JEOR: SCORE: 1782.38

## 2021-04-14 NOTE — PLAN OF CARE
D: chronic systolic HF 2/2 NICM s/p HM II LVAD, now listed for transplant-status 2E; Hx of VT, afib, DM2, CKD, COPD.  I/A: A&Ox4, up ad francisco. VSS on room air. LVAD #s WNL, no alarms, dressing CDI. Bilateral shoulder pain- utilizing PRN tylenol, robaxin, and tramadol. BG q4h- hypoglycemic this morning, see progress note. Voiding with urinal at bedside, BM yesterday. PRN trazodone for sleep.  P: Continue to follow POC and contact Cards 2 with updates/changes.

## 2021-04-14 NOTE — PROGRESS NOTES
Patient's BG 56, re-check 52 @ 1117. Patient drinking orange juice before serum glucose could be collected by lab. Patient eating sandwich from home, BG now 110 @ 1136. Notified Cards 2 Soraya Marshall NP.

## 2021-04-14 NOTE — PLAN OF CARE
D: chronic systolic HF 2/2 NICM s/p HM II LVAD, now listed for transplant-status 2E; Hx of VT, afib, DM2, CKD, COPD.  I/A: A&Ox4, up ad francisco. VSS on room air. SR with 1st degree AVB on tele. LVAD #s WNL, no alarms, dressing CDI. Bilateral shoulder pain- utilizing PRN tylenol, robaxin, and tramadol. BG q4h- hypoglycemic this morning, see progress note, no changes to acarbose dose. Adequate UO, BM yesterday. Tolerating 3 gram Na diet.  P: Continue to follow POC and contact Cards 2 with updates/changes.

## 2021-04-14 NOTE — CONSULTS
Health Psychology                  Clinic    Department of Medicine  Deepika Cook, PhD, LP [phone]                          [facility]  [facility] Sagrario Feng, PhD, LP [phone]                  3rd Floor  [place] Mail [address]   Parviz Hobbs, PhD, ABPP, LP [phone]     [address] [facility],   [address] [facility],  Rajani Bejarano,  PhD, LP [phone]            [place] [address] Jennifer Bahena, PhD, LP [phone]     Inpatient Health Psychology Consultation    Mr. Willingham is a 65-year-old man with significant cardiac history hospitalized awaiting heart transplant. Attempted follow-up. He was with  services at this time. He stated things were going well, had no concerns and was not in need of follow-up at this time. Will follow-up at a later time, but likely not weekly given his mental health stability and demands on our service.    Rajani Bejarano, PhD, LP  Clinical Health Psychologist

## 2021-04-14 NOTE — PROGRESS NOTES
[facility]   Cardiology II Service / Advanced Heart Failure  Daily Progress Note  Date of Service: 4/14/2021      Patient: Jim Willingham  MRN: [id number]  Admission Date: 2/8/2021  Hospital Day # 65    Assessment and Plan: Jim Willingham is a 65 year old male with history of NICM EF 20% c/b VT s/p CRT-D s/p HMII LVAD 6/19/2017 originally intended as destination therapy 2/2 obesity however with recent weight loss now candidate for transplantation. He is admitted for worsening functional status and renal function concerning for worsening heart failure. He is now listed status 2E for transplant.     Chronic systolic heart failure secondary to NICM s/p HM II LVAD. Echo 1/8/21 at 9400 rpm, EF<30%m LVIDd 6.8cm, at least mildly reduced RV function, AoV closed without AI, mild-mod eccentric MR, dilated IVC without collapse. RHC 3/23 RA 10 mPA 25 mPCWP 14 Kee CO/CI 6.5/3.1.  Stage D, NYHA Class III  ACEi/ARB contraindicated due to renal dysfunction. Isordil 10 mg po TID. Hydralazine increased to 75 mg po TID.   BB Contraindicated due to low cardiac output.   Aldosterone antagonist contraindicated due to renal dysfunction  SCD prophylaxis CRT-D  Fluid status: Euvolemic Bumex 4 mg po BID.   MAP: 81  LDH: 336 4/9/21  Anticoagulation: Coumadin per pharmacy. INR- 2.03, goal 2.5-3, goal increased in setting of power spikes and elevated flows this hospitalization. Will give higher dose of Coumadin today.   Antiplatelet: ASA 81 mg po daily  - remains on the cardiac transplant list status 2E. Persistent escalation of diuretics in setting of progressive heart failure with refractory hypervolemia.      The patient's HeartMate LVAD was interrogated 4/14/2021  * Speed 9600 rpm   * Pulsatility index 3.4  * Power 7.2 Manuel   * Flow 7.3 L/minute   Fluid status: mild hypervolemia.   Alarms were reviewed, and notable for PI events.   All external components were inspected and showed no evidence of damage or  malfunction.     WALTER on CKD Stage III. Cr peaked at 2.22.  - Cr- 1.74 (1.72).     History of Afib. History of VT/VF. Asymptomatic bursts of questionable AF vs NSVT without hemodynamic compromise. RHC 3/23 RA 10 mPA 25 mPCWP 14 Kee CO/CI 6.5/3.1. CHADSVASC-5.   - Continue Amiodarone 200 mg po daily.   - Coumadin as above.      DM type II, controlled. Symptomatic Hypoglycemia, resolved. Low Cortisol: Hgb A1C-6.2. Lantus and Novololg sliding scale insulin discontinued. Endo consult appreciated. C-peptide 13.9 an Proinsulin 18.4. Serum cortisol 7.7 3/6/21, cosyntropin stim test WNL per Endo.   -Per endo --> Fingerstick glucose might not be accurate for hypoglycemia, if patient has glucose <55 (without insulin), plasma glucose should be sent for confirmation of hypoglycemia prior to correction. While the patient is off insulin therapy, POC glucose readings above 60 are acceptable for the patient  - postprandial hypoglycemia this AM. Endo notified, continue Acarbose 25 mg po daily. If recurrent episode pt amenable to increasing Acarbose to 50 mg po daily.     Bilateral Shoulder Pain, Right > Left. Prior history of osteoarthritis to right shoulder per Xray in 2018. He notes ortho evaluation in 2015 with CT consistent with right rotator cuff tear (unable to obtain MRI due to LVAD). Has had steroid injections in the past with some improvement.   - Tramadol prn  - 3 view X-ray of right shoulder  - Continue Capcaicin cream prn.      Chronic/resolved conditions  COPD/Asthma: pta Breo Ellipta, albuterol prn.   Depression: pta Bupropion  Right sided weakness, resolved. CT head negative for acute findings. Appreciate Neuro eval.   Staph Hominis Bacteremia. No need for surveillance blood cx per transplant ID.    Rhinovirus, resolved. Bacterial bronchitis. Completed 5 day course of Cefdinir. COVID negative 2/8. Resp PCR +rhinovirus. CXR 2/12 with no overt PNA. Stopped cefdinir 2/15. Cleared for transplant per ID. Repeat RVP  "negative 3/5. COVID repeated due to sinus congestion, negative. Congestion improved with transition from Claritin to Zyrtec.   Carpel tunnel, bilateral s/p bilateral release. Evidence of thenar atrophy. Relief with steroid injection 11/20. Carpal tunnel release 2/18/21. Appreciate Ortho/Plastics consult, signed off. Continue Tylenol 650 mg po QID and Gabapentin 300 mg in AM, 600 mg in the afternoon, and 600 mg in the evening.      FEN: 3 gram sodium diet   PROPHY:  Coumadin  LINES:  PIV   DISPO:  TBD pending cardiac transplant.   CODE STATUS: Full Code   ================================================================  Interval History/ROS: He complains of right bilateral shoulder pain, right greater than left. He notes persistent fatigue, which he attributes to shoulder pain. He denies fever, chills, chest pain, palpitations, cough, nausea, vomiting, diarrhea, melena, and hematochezia. He notes LE edema. He is tolerating oral intake and is ambulating.     Last 24 hr care team notes reviewed.   ROS:  4 point ROS including Respiratory, CV, GI and , other than that noted in the HPI, is negative.     Medications: Reviewed in EPIC.     Physical Exam:   BP (!) 89/74 (BP Location: Left arm)   Pulse 75   Temp 98.2  F (36.8  C) (Oral)   Resp 18   Ht 1.727 m (5' 8\")   Wt 101.3 kg (223 lb 4.8 oz)   SpO2 99%   BMI 33.95 kg/m    GENERAL: Appears alert and oriented times three.   HEENT: Eye symmetrical and free of discharge bilaterally. Mucous membranes moist and without lesions.  NECK: Supple and without lymphadenopathy. JVD 10 cm    CV: RRR, S1S2 present with LVAD hum.   RESPIRATORY: Respirations regular, even, and unlabored. Lungs CTA throughout.   GI: Soft and non distended with normoactive bowel sounds present in all quadrants. No tenderness, rebound, guarding. No organomegaly.   EXTREMITIES: +1 bilateral LE peripheral edema. 2+ bilateral pedal pulses.   NEUROLOGIC: Alert and orientated x 3. CN II-XII grossly " intact. No focal deficits.   MUSCULOSKELETAL: No joint swelling or tenderness.   SKIN: No jaundice. No rashes or lesions. LVAD drive line covered.     Data:  CMP  Recent Labs   Lab 04/14/21  0627 04/13/21  0609 04/12/21  1855 04/12/21  0601 04/11/21  2206    136 135 135 132*   POTASSIUM 4.1 3.9 3.7 3.9 4.1   CHLORIDE 100 102 100 102 100   CO2 26 25 27 26 28   ANIONGAP 8 9 8 7 4   GLC 83 96 114* 91 139*   BUN 46* 45* 45* 45* 47*   CR 1.74* 1.72* 1.70* 1.60* 1.63*   GFRESTIMATED 41* 41* 42* 45* 44*   GFRESTBLACK 47* 48* 48* 52* 51*   QAMAR 8.7 9.0 9.0 8.9 8.3*   MAG 2.5* 2.5*  --  2.4* 2.4*   PROTTOTAL  --   --   --  6.7*  --    ALBUMIN  --   --   --  3.7  --    BILITOTAL  --   --   --  0.7  --    ALKPHOS  --   --   --  110  --    AST  --   --   --  36  --    ALT  --   --   --  37  --      CBC  Recent Labs   Lab 04/12/21  0601 04/08/21  0551   WBC 4.5 4.3   RBC 4.03* 4.48   HGB 11.8* 13.5   HCT 37.3* 42.7   MCV 93 95   MCH 29.3 30.1   MCHC 31.6 31.6   RDW 15.4* 15.9*    205     INR  Recent Labs   Lab 04/14/21  0627 04/13/21  0609 04/12/21  0601 04/11/21  0708   INR 2.03* 1.88* 2.22* 2.44*       Patient discussed with Dr. Bautista.      Soraya Marshall [facility]  4/14/2021

## 2021-04-14 NOTE — PLAN OF CARE
D: Pt admit 2/8/21 awaiting heart transplant status 2E. PMH NICM EF 20% c/b VT s/p CRT-D s/p HM2 LVAD 6/19/2017 originally DT 2/2 obesity but due to recent weight loss pt candidate for transplantation     I/A:   Neuro: A&Ox4. DND order in place [id number]. PRN melatonin x1 before bed for sleep promotion.   VS: VSS. RA  LVAD #'s WNL, no alarms this shift. Dressing CDI  Tele: SR 1st degree AVB  Pain: pt c/o shoulder pain controlled with PRN capsaicin cream x1, PRN robaxin x2, PRN tylenol x2, PRN tramadol x2 before bed.   GI/: Urinating adequately into bedside urinal. No BM this shift. LBM yesterday 4/13  Diet: 3g Na  BG/insulin: BG checks q4hr. Pt declined checks overnight so check done at HS and again with AM labs  IV/Drips: R PIV SL  Activity: independent  Skin/drains: WDL     P: Plan to Continue to monitor pt status and report changes to Cards 2.      Erna Rodriguez RN

## 2021-04-15 LAB
ALBUMIN SERPL-MCNC: 3.6 G/DL (ref 3.4–5)
ALP SERPL-CCNC: 100 U/L (ref 40–150)
ALT SERPL W P-5'-P-CCNC: 36 U/L (ref 0–70)
ANION GAP SERPL CALCULATED.3IONS-SCNC: 6 MMOL/L (ref 3–14)
AST SERPL W P-5'-P-CCNC: 29 U/L (ref 0–45)
BILIRUB DIRECT SERPL-MCNC: 0.2 MG/DL (ref 0–0.2)
BILIRUB SERPL-MCNC: 0.6 MG/DL (ref 0.2–1.3)
BUN SERPL-MCNC: 54 MG/DL (ref 7–30)
CALCIUM SERPL-MCNC: 8.8 MG/DL (ref 8.5–10.1)
CHLORIDE SERPL-SCNC: 100 MMOL/L (ref 94–109)
CO2 SERPL-SCNC: 28 MMOL/L (ref 20–32)
CREAT SERPL-MCNC: 1.85 MG/DL (ref 0.66–1.25)
ERYTHROCYTE [DISTWIDTH] IN BLOOD BY AUTOMATED COUNT: 15.5 % (ref 10–15)
GFR SERPL CREATININE-BSD FRML MDRD: 38 ML/MIN/{1.73_M2}
GLUCOSE BLDC GLUCOMTR-MCNC: 121 MG/DL (ref 70–99)
GLUCOSE BLDC GLUCOMTR-MCNC: 180 MG/DL (ref 70–99)
GLUCOSE SERPL-MCNC: 85 MG/DL (ref 70–99)
HCT VFR BLD AUTO: 36.6 % (ref 40–53)
HGB BLD-MCNC: 11.5 G/DL (ref 13.3–17.7)
INR PPP: 2.36 (ref 0.86–1.14)
MAGNESIUM SERPL-MCNC: 2.7 MG/DL (ref 1.6–2.3)
MCH RBC QN AUTO: 29.4 PG (ref 26.5–33)
MCHC RBC AUTO-ENTMCNC: 31.4 G/DL (ref 31.5–36.5)
MCV RBC AUTO: 94 FL (ref 78–100)
PLATELET # BLD AUTO: 168 10E9/L (ref 150–450)
POTASSIUM SERPL-SCNC: 3.8 MMOL/L (ref 3.4–5.3)
PROT SERPL-MCNC: 6.6 G/DL (ref 6.8–8.8)
RBC # BLD AUTO: 3.91 10E12/L (ref 4.4–5.9)
SODIUM SERPL-SCNC: 133 MMOL/L (ref 133–144)
WBC # BLD AUTO: 4.2 10E9/L (ref 4–11)

## 2021-04-15 PROCEDURE — 214N000001 HC R&B CCU UMMC

## 2021-04-15 PROCEDURE — 99232 SBSQ HOSP IP/OBS MODERATE 35: CPT | Mod: 24 | Performed by: INTERNAL MEDICINE

## 2021-04-15 PROCEDURE — 36415 COLL VENOUS BLD VENIPUNCTURE: CPT | Performed by: STUDENT IN AN ORGANIZED HEALTH CARE EDUCATION/TRAINING PROGRAM

## 2021-04-15 PROCEDURE — 999N001017 HC STATISTIC GLUCOSE BY METER IP

## 2021-04-15 PROCEDURE — 85610 PROTHROMBIN TIME: CPT | Performed by: STUDENT IN AN ORGANIZED HEALTH CARE EDUCATION/TRAINING PROGRAM

## 2021-04-15 PROCEDURE — 85027 COMPLETE CBC AUTOMATED: CPT | Performed by: STUDENT IN AN ORGANIZED HEALTH CARE EDUCATION/TRAINING PROGRAM

## 2021-04-15 PROCEDURE — 80076 HEPATIC FUNCTION PANEL: CPT | Performed by: NURSE PRACTITIONER

## 2021-04-15 PROCEDURE — 93750 INTERROGATION VAD IN PERSON: CPT | Performed by: NURSE PRACTITIONER

## 2021-04-15 PROCEDURE — 250N000013 HC RX MED GY IP 250 OP 250 PS 637: Performed by: INTERNAL MEDICINE

## 2021-04-15 PROCEDURE — 250N000013 HC RX MED GY IP 250 OP 250 PS 637: Performed by: NURSE PRACTITIONER

## 2021-04-15 PROCEDURE — 80048 BASIC METABOLIC PNL TOTAL CA: CPT | Performed by: STUDENT IN AN ORGANIZED HEALTH CARE EDUCATION/TRAINING PROGRAM

## 2021-04-15 PROCEDURE — 250N000013 HC RX MED GY IP 250 OP 250 PS 637: Performed by: STUDENT IN AN ORGANIZED HEALTH CARE EDUCATION/TRAINING PROGRAM

## 2021-04-15 PROCEDURE — 80076 HEPATIC FUNCTION PANEL: CPT | Performed by: STUDENT IN AN ORGANIZED HEALTH CARE EDUCATION/TRAINING PROGRAM

## 2021-04-15 PROCEDURE — 250N000013 HC RX MED GY IP 250 OP 250 PS 637: Performed by: PHYSICIAN ASSISTANT

## 2021-04-15 PROCEDURE — 83735 ASSAY OF MAGNESIUM: CPT | Performed by: STUDENT IN AN ORGANIZED HEALTH CARE EDUCATION/TRAINING PROGRAM

## 2021-04-15 PROCEDURE — 250N000009 HC RX 250: Performed by: NURSE PRACTITIONER

## 2021-04-15 RX ORDER — WARFARIN SODIUM 10 MG/1
10 TABLET ORAL
Status: COMPLETED | OUTPATIENT
Start: 2021-04-15 | End: 2021-04-15

## 2021-04-15 RX ADMIN — ACETAMINOPHEN 650 MG: 325 TABLET, FILM COATED ORAL at 05:01

## 2021-04-15 RX ADMIN — GABAPENTIN 600 MG: 300 CAPSULE ORAL at 13:53

## 2021-04-15 RX ADMIN — MONTELUKAST 10 MG: 10 TABLET, FILM COATED ORAL at 22:09

## 2021-04-15 RX ADMIN — METHOCARBAMOL 500 MG: 500 TABLET, FILM COATED ORAL at 22:09

## 2021-04-15 RX ADMIN — POTASSIUM CHLORIDE 40 MEQ: 750 TABLET, EXTENDED RELEASE ORAL at 08:32

## 2021-04-15 RX ADMIN — BUPROPION HYDROCHLORIDE 100 MG: 100 TABLET, EXTENDED RELEASE ORAL at 08:33

## 2021-04-15 RX ADMIN — AMIODARONE HYDROCHLORIDE 200 MG: 200 TABLET ORAL at 08:40

## 2021-04-15 RX ADMIN — HYDRALAZINE HYDROCHLORIDE 75 MG: 50 TABLET ORAL at 20:13

## 2021-04-15 RX ADMIN — CAPSAICIN: 0.75 CREAM TOPICAL at 08:43

## 2021-04-15 RX ADMIN — BUMETANIDE 4 MG: 2 TABLET ORAL at 13:53

## 2021-04-15 RX ADMIN — ACETAMINOPHEN 650 MG: 325 TABLET, FILM COATED ORAL at 22:09

## 2021-04-15 RX ADMIN — ACARBOSE 25 MG: 25 TABLET ORAL at 06:37

## 2021-04-15 RX ADMIN — ASPIRIN 81 MG CHEWABLE TABLET 81 MG: 81 TABLET CHEWABLE at 08:33

## 2021-04-15 RX ADMIN — GABAPENTIN 300 MG: 300 CAPSULE ORAL at 08:34

## 2021-04-15 RX ADMIN — ISOSORBIDE DINITRATE 10 MG: 10 TABLET ORAL at 20:12

## 2021-04-15 RX ADMIN — ISOSORBIDE DINITRATE 10 MG: 10 TABLET ORAL at 08:34

## 2021-04-15 RX ADMIN — HYDRALAZINE HYDROCHLORIDE 75 MG: 50 TABLET ORAL at 08:34

## 2021-04-15 RX ADMIN — METHOCARBAMOL 500 MG: 500 TABLET, FILM COATED ORAL at 05:01

## 2021-04-15 RX ADMIN — CETIRIZINE HYDROCHLORIDE 10 MG: 10 TABLET, FILM COATED ORAL at 08:33

## 2021-04-15 RX ADMIN — ISOSORBIDE DINITRATE 10 MG: 10 TABLET ORAL at 13:54

## 2021-04-15 RX ADMIN — ALLOPURINOL 300 MG: 300 TABLET ORAL at 08:34

## 2021-04-15 RX ADMIN — WARFARIN SODIUM 10 MG: 10 TABLET ORAL at 17:56

## 2021-04-15 RX ADMIN — UMECLIDINIUM 1 PUFF: 62.5 AEROSOL, POWDER ORAL at 08:33

## 2021-04-15 RX ADMIN — FLUTICASONE FUROATE AND VILANTEROL TRIFENATATE 1 PUFF: 100; 25 POWDER RESPIRATORY (INHALATION) at 08:33

## 2021-04-15 RX ADMIN — ANAKINRA 100 MG: 100 INJECTION, SOLUTION SUBCUTANEOUS at 12:18

## 2021-04-15 RX ADMIN — DOCUSATE SODIUM 50 MG AND SENNOSIDES 8.6 MG 1 TABLET: 8.6; 5 TABLET, FILM COATED ORAL at 20:12

## 2021-04-15 RX ADMIN — BUPROPION HYDROCHLORIDE 100 MG: 100 TABLET, EXTENDED RELEASE ORAL at 20:12

## 2021-04-15 RX ADMIN — POTASSIUM CHLORIDE 40 MEQ: 750 TABLET, EXTENDED RELEASE ORAL at 20:12

## 2021-04-15 RX ADMIN — HYDRALAZINE HYDROCHLORIDE 75 MG: 50 TABLET ORAL at 13:54

## 2021-04-15 RX ADMIN — Medication 25 MG: at 22:08

## 2021-04-15 RX ADMIN — Medication 50 MG: at 05:01

## 2021-04-15 RX ADMIN — GABAPENTIN 600 MG: 300 CAPSULE ORAL at 22:09

## 2021-04-15 RX ADMIN — BUMETANIDE 4 MG: 2 TABLET ORAL at 08:33

## 2021-04-15 ASSESSMENT — ACTIVITIES OF DAILY LIVING (ADL)
ADLS_ACUITY_SCORE: 10

## 2021-04-15 ASSESSMENT — MIFFLIN-ST. JEOR: SCORE: 1796.9

## 2021-04-15 NOTE — PROGRESS NOTES
SPIRITUAL HEALTH SERVICES  SPIRITUAL ASSESSMENT Progress Note  [facility] ([place]) 6C     In-depth conversation with pt, who enjoys talking about his life experiences, his willa, and his hope for healing. 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 Pt reflected deeply regarding the importance of willa for coping.    PLAN: continue to follow, visiting at least weekly while pt on unit.    Parviz Smith M.Div (Bill)., [facility]  Staff   Pager [phone]      * [facility] remains available 24/7 for emergent requests/referrals, either by having the switchboard page the on-call  or by entering an ASAP/STAT consult in Epic (this will also page the on-call ). Routine Epic consults receive an initial response within 24 hours.*

## 2021-04-15 NOTE — PROGRESS NOTES
[facility]   Cardiology II Service / Advanced Heart Failure  Daily Progress Note  Date of Service: 4/15/2021      Patient: Jim Willingham  MRN: [id number]  Admission Date: 2/8/2021  Hospital Day # 66    Assessment and Plan: Jim Willingham is a 65 year old male with history of NICM EF 20% c/b VT s/p CRT-D s/p HMII LVAD 6/19/2017 originally intended as destination therapy 2/2 obesity however with recent weight loss now candidate for transplantation. He is admitted for worsening functional status and renal function concerning for worsening heart failure. He is now listed status 2E for transplant.     Chronic systolic heart failure secondary to NICM s/p HM II LVAD. Echo 1/8/21 at 9400 rpm, EF<30%m LVIDd 6.8cm, at least mildly reduced RV function, AoV closed without AI, mild-mod eccentric MR, dilated IVC without collapse. RHC 3/23 RA 10 mPA 25 mPCWP 14 Kee CO/CI 6.5/3.1.  Stage D, NYHA Class III  ACEi/ARB contraindicated due to renal dysfunction. Isordil 10 mg po TID. Hydralazine increased to 75 mg po TID.   BB Contraindicated due to low cardiac output.   Aldosterone antagonist contraindicated due to renal dysfunction  SCD prophylaxis CRT-D  Fluid status: Euvolemic Bumex 4 mg po BID.   MAP: 87  LDH: 336 4/9/21   Anticoagulation: Coumadin per pharmacy. INR- 2.36, goal 2.5-3, goal increased in setting of power spikes and elevated flows this hospitalization. Will give higher dose of Coumadin today.   Antiplatelet: ASA 81 mg po daily  - remains on the cardiac transplant list status 2E. Persistent escalation of diuretics in setting of progressive heart failure with refractory hypervolemia.      The patient's HeartMate LVAD was interrogated 4/15/2021  * Speed 9600 rpm   * Pulsatility index 4.2  * Power 6 Manuel   * Flow 5.3 L/minute   Fluid status: mild hypervolemia.   Alarms were reviewed, and notable for PI events.   All external components were inspected and showed no evidence of damage or  malfunction.     WALTER on CKD Stage III. Cr peaked at 2.22.  - Cr- 1.85 (1.74).     History of Afib. History of VT/VF. Asymptomatic bursts of questionable AF vs NSVT without hemodynamic compromise. RHC 3/23 RA 10 mPA 25 mPCWP 14 Kee CO/CI 6.5/3.1. CHADSVASC-5.   - Continue Amiodarone 200 mg po daily.   - Coumadin as above.      DM type II, controlled. Symptomatic Hypoglycemia, resolved. Low Cortisol: Hgb A1C-6.2. Lantus and Novololg sliding scale insulin discontinued. Endo consult appreciated. C-peptide 13.9 an Proinsulin 18.4. Serum cortisol 7.7 3/6/21, cosyntropin stim test WNL per Endo.   -Per endo --> Fingerstick glucose might not be accurate for hypoglycemia, if patient has glucose <55 (without insulin), plasma glucose should be sent for confirmation of hypoglycemia prior to correction. While the patient is off insulin therapy, POC glucose readings above 60 are acceptable for the patient  - postprandial hypoglycemia this AM. Endo notified, continue Acarbose 25 mg po daily. If recurrent episode pt amenable to increasing Acarbose to 50 mg po daily.      Bilateral Shoulder Pain, Right > Left. Prior history of osteoarthritis to right shoulder per Xray in 2018. He notes ortho evaluation in 2015 with CT consistent with right rotator cuff tear (unable to obtain MRI due to LVAD). Has had steroid injections in the past with some improvement.   - Tramadol prn  - 3 view X-ray of right shoulder with mild osteoarthritis and bone spur. Reviewed with Ortho and awaiting recs.   - Continue Capcaicin cream prn.      Chronic/resolved conditions  COPD/Asthma: pta Breo Ellipta, albuterol prn.   Depression: pta Bupropion  Right sided weakness, resolved. CT head negative for acute findings. Appreciate Neuro eval.   Staph Hominis Bacteremia. No need for surveillance blood cx per transplant ID.    Rhinovirus, resolved. Bacterial bronchitis. Completed 5 day course of Cefdinir. COVID negative 2/8. Resp PCR +rhinovirus. CXR 2/12 with no  "overt PNA. Stopped cefdinir 2/15. Cleared for transplant per ID. Repeat RVP negative 3/5. COVID repeated due to sinus congestion, negative. Congestion improved with transition from Claritin to Zyrtec.   Carpel tunnel, bilateral s/p bilateral release. Evidence of thenar atrophy. Relief with steroid injection 11/20. Carpal tunnel release 2/18/21. Appreciate Ortho/Plastics consult, signed off. Continue Tylenol 650 mg po QID and Gabapentin 300 mg in AM, 600 mg in the afternoon, and 600 mg in the evening. Notified ortho of finger paresthesia, which is common after surgery per ortho.      FEN: 3 gram sodium diet   PROPHY:  Coumadin  LINES:  PIV   DISPO:  TBD pending cardiac transplant.   CODE STATUS: Full Code   ================================================================  Interval History/ROS: He continues to complain of right shoulder pain. He notes paresthesia and pain to his right wrists, which is worse with activity. He denies fever, chills, chest pain, palpitations, RESENDIZ, cough, nausea, vomiting, diarrhea. He notes mild LE edema. He is tolerating oral intake and ambulation in the ayers.     Last 24 hr care team notes reviewed.   ROS:  4 point ROS including Respiratory, CV, GI and , other than that noted in the HPI, is negative.     Medications: Reviewed in EPIC.     Physical Exam:   BP (!) 92/30 (BP Location: Right arm)   Pulse 67   Temp 97.8  F (36.6  C) (Axillary)   Resp 18   Ht 1.727 m (5' 8\")   Wt 102.7 kg (226 lb 8 oz)   SpO2 100%   BMI 34.44 kg/m    GENERAL: Appears alert and oriented times three.   HEENT: Eye symmetrical and free of discharge bilaterally. Mucous membranes moist and without lesions.  NECK: Supple and without lymphadenopathy. JVD lower 1/3 of neck upright.   CV: RRR, S1S2 present with LVAD hum.   RESPIRATORY: Respirations regular, even, and unlabored. Lungs CTA throughout.   GI: Soft and non distended with normoactive bowel sounds present in all quadrants. No tenderness, rebound, " guarding. No organomegaly.   EXTREMITIES: +1 bilateral LE peripheral edema. 2+ bilateral pedal pulses.   NEUROLOGIC: Alert and orientated x 3. CN II-XII grossly intact. No focal deficits.   MUSCULOSKELETAL: No joint swelling or tenderness.   SKIN: No jaundice. No rashes or lesions. LVAD drive line covered.     Data:  CMP  Recent Labs   Lab 04/15/21  0553 04/14/21  0627 04/13/21  0609 04/12/21  1855 04/12/21  0601    134 136 135 135   POTASSIUM 3.8 4.1 3.9 3.7 3.9   CHLORIDE 100 100 102 100 102   CO2 28 26 25 27 26   ANIONGAP 6 8 9 8 7   GLC 85 83 96 114* 91   BUN 54* 46* 45* 45* 45*   CR 1.85* 1.74* 1.72* 1.70* 1.60*   GFRESTIMATED 38* 41* 41* 42* 45*   GFRESTBLACK 44* 47* 48* 48* 52*   QAMAR 8.8 8.7 9.0 9.0 8.9   MAG 2.7* 2.5* 2.5*  --  2.4*   PROTTOTAL 6.6*  --   --   --  6.7*   ALBUMIN 3.6  --   --   --  3.7   BILITOTAL 0.6  --   --   --  0.7   ALKPHOS 100  --   --   --  110   AST 29  --   --   --  36   ALT 36  --   --   --  37     CBC  Recent Labs   Lab 04/15/21  0553 04/12/21  0601   WBC 4.2 4.5   RBC 3.91* 4.03*   HGB 11.5* 11.8*   HCT 36.6* 37.3*   MCV 94 93   MCH 29.4 29.3   MCHC 31.4* 31.6   RDW 15.5* 15.4*    171     INR  Recent Labs   Lab 04/15/21  0553 04/14/21  0627 04/13/21  0609 04/12/21  0601   INR 2.36* 2.03* 1.88* 2.22*       Patient discussed with Dr. Bautista.     Soraya Marshall [facility]  4/15/2021

## 2021-04-15 NOTE — PLAN OF CARE
D/napping so checked him over briefly, has edema in LE  I/am K 3,8 no replacement given on days, he is on 40 bid, called cards 2, and he said NO replacement tonight  I/MT notified me of fast long VT runs. 1 min rate 300, 36 sec rate 300, he said he did not feel it. Called on call MD and notified him  A/VAD helps the tolerance  D/He said he felt good today until the afternoon and then napped and is now up but still feels tired.  A/VAD site with intact skin, no redness, no rash, no drainage

## 2021-04-15 NOTE — PROGRESS NOTES
LVAD/Transplant Social Work Services Progress Note      Date of Initial Social Work Evaluation: 2/10/2021  Collaborated with: Pt     Data: Pt is an LVAD pt now listed status 2E for heart transplant. Pt had bilateral carpal tunnel surgery 2/20.   Intervention: Supportive Visit   Assessment: Pt continuing to cope well with prolonged hospitalization as he waits for heart transplant. Pt remains positive and optimistic. Pt feels he is receiving great support from his family, friends and [place] community. Pt plans on continuing to participate in virtual transplant support group on Thursdays.   Education provided by SW: Ongoing Social Work support   Plan:    Discharge Plans in Progress: None     Barriers to d/c plan: Pt waiting for heart transplant     Follow up Plan: SW to continue to follow.

## 2021-04-15 NOTE — PLAN OF CARE
D: Pt admit 2/8/21 awaiting heart transplant status 2E. PMH NICM EF 20% c/b VT s/p CRT-D s/p HM2 LVAD 6/19/2017 originally DT 2/2 obesity but due to recent weight loss pt candidate for transplantation     I/A:   Neuro: A&Ox4. DND order in place [id number]. PRN melatonin x1 before bed for sleep promotion.   VS: VSS. RA  LVAD #'s WNL, no alarms this shift. Dressing CDI  Tele: SR 1st degree AVB  Pain: pt c/o shoulder pain controlled with PRN capsaicin cream x1, PRN robaxin x2, PRN tylenol x2, PRN tramadol x2 before bed.   GI/: Urinating adequately into bedside urinal. No BM this shift. LBM yesterday 4/14  Diet: 3g Na  BG/insulin: BG checks q4hr. Pt declined checks overnight so check done at HS and again with AM labs  IV/Drips: R PIV SL  Activity: independent  Skin/drains: WDL     P: Plan to Continue to monitor pt status and report changes to Cards 2.      Erna Rodriguze RN

## 2021-04-15 NOTE — PROGRESS NOTES
"D:Up out bed to chair  On personal computer.  States he has  \"constant\"   Pain in  Both shoulders.   Level at the time acceptable to pain.  Has requested no pain medication.   No  Nonverbal indication of pain.   Denies chest pain.   Denies shortness of breath.   Lungs are clear bilaterally.   Has moderate pitting edema to knees  (2+).   Has had 1100 ml of urine output.   Is adhering to fluid restriction.   Visiting with wife early morning til shortly after lunch.   Has verbalized no concerns.  Has had no complaints.       A: Is physically comfortable.   Tolerating diet and activity well.   Breathing easy with normal O2 sats on room air.   No arrhthymias.   NSR  HR 60's with occasional  PVC's.   LVAD numbers are within therapeutic range.   (see flow sheets for details).  AVSS (see flow sheets).    Condition is stable.    P:Continue to assess general status.  Strict I/O. Monitor rhythm and vital signs.  Monitor temp  Per routine.  OOB for meals.  Encourage ambulation in halls.  Assess level of comfort.   Notify MD with any acute changes in rhythm or vital signs.  "

## 2021-04-16 ENCOUNTER — APPOINTMENT (OUTPATIENT)
Dept: PHYSICAL THERAPY | Facility: CLINIC | Age: 66
DRG: 001 | End: 2021-04-16
Attending: INTERNAL MEDICINE
Payer: COMMERCIAL

## 2021-04-16 LAB
ANION GAP SERPL CALCULATED.3IONS-SCNC: 8 MMOL/L (ref 3–14)
BUN SERPL-MCNC: 48 MG/DL (ref 7–30)
CALCIUM SERPL-MCNC: 8.6 MG/DL (ref 8.5–10.1)
CHLORIDE SERPL-SCNC: 103 MMOL/L (ref 94–109)
CO2 SERPL-SCNC: 24 MMOL/L (ref 20–32)
CREAT SERPL-MCNC: 1.77 MG/DL (ref 0.66–1.25)
GFR SERPL CREATININE-BSD FRML MDRD: 40 ML/MIN/{1.73_M2}
GLUCOSE BLDC GLUCOMTR-MCNC: 152 MG/DL (ref 70–99)
GLUCOSE BLDC GLUCOMTR-MCNC: 81 MG/DL (ref 70–99)
GLUCOSE BLDC GLUCOMTR-MCNC: 85 MG/DL (ref 70–99)
GLUCOSE SERPL-MCNC: 132 MG/DL (ref 70–99)
INR PPP: 2.64 (ref 0.86–1.14)
LDH SERPL L TO P-CCNC: 316 U/L (ref 85–227)
MAGNESIUM SERPL-MCNC: 2.5 MG/DL (ref 1.6–2.3)
POTASSIUM SERPL-SCNC: 3.8 MMOL/L (ref 3.4–5.3)
SODIUM SERPL-SCNC: 134 MMOL/L (ref 133–144)

## 2021-04-16 PROCEDURE — 83735 ASSAY OF MAGNESIUM: CPT | Performed by: STUDENT IN AN ORGANIZED HEALTH CARE EDUCATION/TRAINING PROGRAM

## 2021-04-16 PROCEDURE — 999N001017 HC STATISTIC GLUCOSE BY METER IP

## 2021-04-16 PROCEDURE — 83615 LACTATE (LD) (LDH) ENZYME: CPT | Performed by: STUDENT IN AN ORGANIZED HEALTH CARE EDUCATION/TRAINING PROGRAM

## 2021-04-16 PROCEDURE — 250N000013 HC RX MED GY IP 250 OP 250 PS 637: Performed by: NURSE PRACTITIONER

## 2021-04-16 PROCEDURE — 250N000013 HC RX MED GY IP 250 OP 250 PS 637: Performed by: STUDENT IN AN ORGANIZED HEALTH CARE EDUCATION/TRAINING PROGRAM

## 2021-04-16 PROCEDURE — 214N000001 HC R&B CCU UMMC

## 2021-04-16 PROCEDURE — 250N000013 HC RX MED GY IP 250 OP 250 PS 637: Performed by: PHYSICIAN ASSISTANT

## 2021-04-16 PROCEDURE — 36415 COLL VENOUS BLD VENIPUNCTURE: CPT | Performed by: STUDENT IN AN ORGANIZED HEALTH CARE EDUCATION/TRAINING PROGRAM

## 2021-04-16 PROCEDURE — 250N000009 HC RX 250: Performed by: NURSE PRACTITIONER

## 2021-04-16 PROCEDURE — 250N000013 HC RX MED GY IP 250 OP 250 PS 637: Performed by: INTERNAL MEDICINE

## 2021-04-16 PROCEDURE — 80048 BASIC METABOLIC PNL TOTAL CA: CPT | Performed by: STUDENT IN AN ORGANIZED HEALTH CARE EDUCATION/TRAINING PROGRAM

## 2021-04-16 PROCEDURE — 93750 INTERROGATION VAD IN PERSON: CPT | Performed by: NURSE PRACTITIONER

## 2021-04-16 PROCEDURE — 85610 PROTHROMBIN TIME: CPT | Performed by: STUDENT IN AN ORGANIZED HEALTH CARE EDUCATION/TRAINING PROGRAM

## 2021-04-16 PROCEDURE — 99232 SBSQ HOSP IP/OBS MODERATE 35: CPT | Mod: 24 | Performed by: NURSE PRACTITIONER

## 2021-04-16 RX ORDER — LIDOCAINE HYDROCHLORIDE 20 MG/ML
JELLY TOPICAL EVERY 4 HOURS PRN
Status: DISCONTINUED | OUTPATIENT
Start: 2021-04-16 | End: 2021-05-31 | Stop reason: HOSPADM

## 2021-04-16 RX ADMIN — METHOCARBAMOL 500 MG: 500 TABLET, FILM COATED ORAL at 13:00

## 2021-04-16 RX ADMIN — CETIRIZINE HYDROCHLORIDE 10 MG: 10 TABLET, FILM COATED ORAL at 08:31

## 2021-04-16 RX ADMIN — LIDOCAINE HYDROCHLORIDE: 20 JELLY TOPICAL at 16:23

## 2021-04-16 RX ADMIN — ISOSORBIDE DINITRATE 10 MG: 10 TABLET ORAL at 13:43

## 2021-04-16 RX ADMIN — METHOCARBAMOL 500 MG: 500 TABLET, FILM COATED ORAL at 05:39

## 2021-04-16 RX ADMIN — METHOCARBAMOL 500 MG: 500 TABLET, FILM COATED ORAL at 22:25

## 2021-04-16 RX ADMIN — ACETAMINOPHEN 650 MG: 325 TABLET, FILM COATED ORAL at 13:01

## 2021-04-16 RX ADMIN — DOCUSATE SODIUM 50 MG AND SENNOSIDES 8.6 MG 1 TABLET: 8.6; 5 TABLET, FILM COATED ORAL at 19:39

## 2021-04-16 RX ADMIN — ACARBOSE 25 MG: 25 TABLET ORAL at 06:33

## 2021-04-16 RX ADMIN — POTASSIUM CHLORIDE 40 MEQ: 750 TABLET, EXTENDED RELEASE ORAL at 08:30

## 2021-04-16 RX ADMIN — BUMETANIDE 4 MG: 2 TABLET ORAL at 13:43

## 2021-04-16 RX ADMIN — DICLOFENAC SODIUM 2 G: 10 GEL TOPICAL at 08:46

## 2021-04-16 RX ADMIN — Medication 50 MG: at 13:00

## 2021-04-16 RX ADMIN — BUMETANIDE 4 MG: 2 TABLET ORAL at 08:45

## 2021-04-16 RX ADMIN — AMIODARONE HYDROCHLORIDE 200 MG: 200 TABLET ORAL at 08:31

## 2021-04-16 RX ADMIN — HYDRALAZINE HYDROCHLORIDE 75 MG: 50 TABLET ORAL at 19:39

## 2021-04-16 RX ADMIN — ANAKINRA 100 MG: 100 INJECTION, SOLUTION SUBCUTANEOUS at 08:33

## 2021-04-16 RX ADMIN — Medication 50 MG: at 05:40

## 2021-04-16 RX ADMIN — GABAPENTIN 600 MG: 300 CAPSULE ORAL at 22:18

## 2021-04-16 RX ADMIN — POTASSIUM CHLORIDE 40 MEQ: 750 TABLET, EXTENDED RELEASE ORAL at 19:39

## 2021-04-16 RX ADMIN — GABAPENTIN 300 MG: 300 CAPSULE ORAL at 08:31

## 2021-04-16 RX ADMIN — BUPROPION HYDROCHLORIDE 100 MG: 100 TABLET, EXTENDED RELEASE ORAL at 08:31

## 2021-04-16 RX ADMIN — ALLOPURINOL 300 MG: 300 TABLET ORAL at 08:31

## 2021-04-16 RX ADMIN — MONTELUKAST 10 MG: 10 TABLET, FILM COATED ORAL at 22:18

## 2021-04-16 RX ADMIN — WARFARIN SODIUM 9 MG: 4 TABLET ORAL at 17:44

## 2021-04-16 RX ADMIN — HYDRALAZINE HYDROCHLORIDE 75 MG: 50 TABLET ORAL at 08:31

## 2021-04-16 RX ADMIN — ISOSORBIDE DINITRATE 10 MG: 10 TABLET ORAL at 08:32

## 2021-04-16 RX ADMIN — ISOSORBIDE DINITRATE 10 MG: 10 TABLET ORAL at 19:39

## 2021-04-16 RX ADMIN — Medication 10 MG: at 22:25

## 2021-04-16 RX ADMIN — BUPROPION HYDROCHLORIDE 100 MG: 100 TABLET, EXTENDED RELEASE ORAL at 19:39

## 2021-04-16 RX ADMIN — ASPIRIN 81 MG CHEWABLE TABLET 81 MG: 81 TABLET CHEWABLE at 08:31

## 2021-04-16 RX ADMIN — Medication 25 MG: at 22:18

## 2021-04-16 RX ADMIN — Medication 50 MG: at 22:25

## 2021-04-16 RX ADMIN — HYDRALAZINE HYDROCHLORIDE 75 MG: 50 TABLET ORAL at 13:43

## 2021-04-16 RX ADMIN — FLUTICASONE FUROATE AND VILANTEROL TRIFENATATE 1 PUFF: 100; 25 POWDER RESPIRATORY (INHALATION) at 08:49

## 2021-04-16 RX ADMIN — GABAPENTIN 600 MG: 300 CAPSULE ORAL at 13:43

## 2021-04-16 RX ADMIN — ACETAMINOPHEN 650 MG: 325 TABLET, FILM COATED ORAL at 22:25

## 2021-04-16 RX ADMIN — UMECLIDINIUM 1 PUFF: 62.5 AEROSOL, POWDER ORAL at 08:48

## 2021-04-16 RX ADMIN — ACETAMINOPHEN 650 MG: 325 TABLET, FILM COATED ORAL at 05:39

## 2021-04-16 ASSESSMENT — ACTIVITIES OF DAILY LIVING (ADL)
ADLS_ACUITY_SCORE: 10

## 2021-04-16 ASSESSMENT — MIFFLIN-ST. JEOR: SCORE: 1794.63

## 2021-04-16 NOTE — PROGRESS NOTES
CLINICAL NUTRITION SERVICES - REASSESSMENT NOTE     Nutrition Prescription    RECOMMENDATIONS FOR MDs/PROVIDERS TO ORDER:  None at this time     Malnutrition Status:    Patient does not meet two of the established criteria necessary for diagnosing malnutrition but is at risk for malnutrition    Recommendations already ordered by Registered Dietitian (RD):  Complex carb snack at am snacktime (10:00)  Modified oral supplements      Future/Additional Recommendations:  1. Rec continue current diet, as ordered. Monitor need for a fluid restriction, if warranted.  2. Monitor iron status.  3. Monitor glycemic control, especially with low BG recently. DM 2. Hgb A1c of 6.2 on 1/7/21. BG was 81 on 4/16/21.   4. If TFs are needed (if/when post-op Tx), would rec place feeding tube (FT) via radiology due to RNY hx and initiate TFs, Osmolite 1.5 (concentrated, maintenance TF formula) and order Prosource TF modular. Initiate TFs at 15 mL/hr, advancing rate by 10 mL Q 8 hrs (or per provider discretion, pending hemodynamic stability) to goal rate of 65 mL/hr. Osmolite 1.5 at goal of 65 mL/hr and 1 pkt Prosource TF modular: 1560 mL/day, 2340+ kcals, 97+ g PRO, 1188 ml free H20, 317 g CHO, and 0 g fiber daily.     If begin tube feeds:    - Flush FT with 30 mL water Q 4 hrs for patency. Rec provider adjust free water flushes as needed, pending fluid status.   - Ensure K+/Mg++/Phos labs are ordered daily until TFs advance to goal infusion to evaluate for sx of refeeding with nutrition received. If lytes trend low, aggressively replace lytes.       - If not already ordered, order a multivitamin/mineral (certavite 15 mL/day via FT) to help ensure micronutrient needs being met with suspected hypermetabolic demands and potential interruptions to TF infusions.   - Monitor TF and possible need to adjust nutrition support regimen if necessary, pending medical course and nutrition status.       - Monitor need to check a metabolic cart study.   "   - Send a nutrition consult for \"Registered Dietitian to Order TF per Medical Nutrition Therapy Guidelines\" if desire RD to order TFs.   5. Order the following (Sylvester-en-y Gastric Bypass) if pt agreeable:    Multivitamin/minerals: adult dose 2 times daily    Iron: 45-60 mg elemental daily (18-36 mg daily if low risk) - may partly or fully be covered in multivitamin     Calcium Citrate containing vitamin D: 500 mg 3 times daily or 600 mg 2 times daily    Vitamin B12: sublingual form of at least 500 mcg daily or injection of 1000 mcg monthly     B-50 Complex daily     EVALUATION OF THE PROGRESS TOWARD GOALS   Diet: 3 g Sodium diet since 3/24. Ordered to receive two Glucernas at 14:00 (chocolate or strawberry preferred, but varied with butter pecan).    Intake: Good diet tolerance. Flowsheets indicate pt consuming 100% consistently with a good appetite. Per discussion with pt, adequate oral intake. His wife is bringing some food from home. States he is finding some ways to order different things from the menu and combining them. Admits that LOS and food choices affect his oral intake. He does have a fridge in his room and has snacks.        NEW FINDINGS   GI: Last stool noted on 4/6. Unknown date of last stool. Emesis on 4/16.   Endo: Note, BG was 35 mg/dL at 10:00 on 4/2/21, 60 at 10:16 on 4/6/21, 51 and then 57 at 10:17 and 10:23 respectively on 4/13/21, and 52 at 11:17 on 4/14/21. On acarbose (25 mg) daily with breakfast since 4/6/21.   Wt hx: 118.4 kg (2/13/20), 106.2 kg (8/7/20), 103.9 kg (11/13/20), 94.8 kg (1/12/21), 99.5 kg (2/8/21, admit), 100.2 kg (2/15/21), 98.2 kg (2/22), 100 kg (3/2), 99.7 kg (3/11), 99.9 kg (4/9), 102.5 kg (4/16) - Pt had intentionally been trying to lose wt for Tx. No wt loss at this time since admit but wt fluctuates with fluid status changes and diuresis. Pt on bumex most recently.     MALNUTRITION  % Intake: No decreased intake noted  % Weight Loss: Not meeting this criteria  " "  Subcutaneous Fat Loss: None observed  Muscle Loss: Temporal:  Mild. Thenar atrophy per chart and per exam, but pt reports this is a result of carpal tunnel.   Fluid Accumulation/Edema: Does not meet criteria  Malnutrition Diagnosis: Patient does not meet two of the established criteria necessary for diagnosing malnutrition but is at risk for malnutrition    Previous Goals   Patient to consume % of nutritionally adequate meal trays TID, or the equivalent with supplements/snacks.  Evaluation: Meeting.    Previous Nutrition Diagnosis  Predicted inadequate nutrient intake (protein-energy) related to food choices, LOS increasing risk for menu fatigue, and potential upcoming surgery with unknown NPO status duration.  Evaluation: Unresolved/unchanged.    CURRENT NUTRITION DIAGNOSIS  Predicted inadequate nutrient intake (protein-energy) related to food choices, LOS increasing risk for menu fatigue, and potential upcoming surgery with unknown NPO status duration.    INTERVENTIONS  Implementation  Nutrition education for nutrition relationship to health/disease: Discussed carbohydrate choices. Explained simple carbohydrates in comparison to complex carbohydrates. Gave examples. Provided American Diabetes Association information \"Understanding Carbohydrates.\" Encouraged also combining protein, preferably, or fat with carbohydrate choices.   Modify composition of meals/snacks: Ordered peanut butter sandwich on wheat at 10:00. Discussed other ideas to create additional combinations from the menu.   Medical food supplement therapy: Modified oral supplements: Pt will receive two Glucerna oral supplements (chocolate and butter pecan) at 14:00. Will receive strawberry Glucerna until out.      Goals  Patient to consume % of nutritionally adequate meal trays TID, or the equivalent with supplements/snacks.    Monitoring/Evaluation  Progress toward goals will be monitored and evaluated per protocol.     Nutrition will " continue to follow.     Sana Costello, MS, RD, LD, CNSC   6C Pgr: [phone]

## 2021-04-16 NOTE — PROGRESS NOTES
[facility]   Cardiology II Service / Advanced Heart Failure  Daily Progress Note  Date of Service: 4/16/2021      Patient: Jim Willingham  MRN: [id number]  Admission Date: 2/8/2021  Hospital Day # 67    Assessment and Plan: Jim Willingham is a 65 year old male with history of NICM EF 20% c/b VT s/p CRT-D s/p HMII LVAD 6/19/2017 originally intended as destination therapy 2/2 obesity however with recent weight loss now candidate for transplantation. He is admitted for worsening functional status and renal function concerning for worsening heart failure. He is now listed status 2E for transplant.     Chronic systolic heart failure secondary to NICM s/p HM II LVAD. Echo 1/8/21 at 9400 rpm, EF<30%m LVIDd 6.8cm, at least mildly reduced RV function, AoV closed without AI, mild-mod eccentric MR, dilated IVC without collapse. RHC 3/23 RA 10 mPA 25 mPCWP 14 Kee CO/CI 6.5/3.1.  Stage D, NYHA Class III  ACEi/ARB contraindicated due to renal dysfunction. Isordil 10 mg po TID. Hydralazine increased to 75 mg po TID.   BB Contraindicated due to low cardiac output.   Aldosterone antagonist contraindicated due to renal dysfunction  SCD prophylaxis CRT-D  Fluid status: Euvolemic Bumex 4 mg po BID.   MAP: 87  LDH: 336 4/9/21   Anticoagulation: Coumadin per pharmacy. INR- 2.36, goal 2.5-3, goal increased in setting of power spikes and elevated flows this hospitalization. Will give higher dose of Coumadin today.   Antiplatelet: ASA 81 mg po daily  - remains on the cardiac transplant list status 2E. Persistent escalation of diuretics in setting of progressive heart failure with refractory hypervolemia.      The patient's HeartMate LVAD was interrogated 4/162021  * Speed 9600 rpm   * Pulsatility index 3.6  * Power 6.7 Manuel   * Flow 6.4 L/minute   Fluid status: mild hypervolemia.   Alarms were reviewed, and notable for PI events.   All external components were inspected and showed no evidence of damage or  malfunction.     WALTER on CKD Stage III. Cr peaked at 2.22.  - Cr- 1.85 (1.74).     History of Afib. History of VT/VF. Asymptomatic bursts of questionable AF vs NSVT without hemodynamic compromise. RHC 3/23 RA 10 mPA 25 mPCWP 14 Kee CO/CI 6.5/3.1. CHADSVASC-5.   - Continue Amiodarone 200 mg po daily.   - Coumadin as above.      DM type II, controlled. Symptomatic Hypoglycemia, resolved. Low Cortisol: Hgb A1C-6.2. Lantus and Novololg sliding scale insulin discontinued. Endo consult appreciated. C-peptide 13.9 an Proinsulin 18.4. Serum cortisol 7.7 3/6/21, cosyntropin stim test WNL per Endo.   -Per endo --> Fingerstick glucose might not be accurate for hypoglycemia, if patient has glucose <55 (without insulin), plasma glucose should be sent for confirmation of hypoglycemia prior to correction. While the patient is off insulin therapy, POC glucose readings above 60 are acceptable for the patient  - postprandial hypoglycemia this AM. Endo notified, continue Acarbose 25 mg po daily. If recurrent episode pt amenable to increasing Acarbose to 50 mg po daily.      Bilateral Shoulder Pain, Right > Left. Prior history of osteoarthritis to right shoulder per Xray in 2018. He notes ortho evaluation in 2015 with CT consistent with right rotator cuff tear (unable to obtain MRI due to LVAD). Has had steroid injections in the past with some improvement. 3 view X-ray of right shoulder with mild osteoarthritis and bone spur.  - Tramadol prn  - Reviewed with Ortho with recommendations for conservative approach. Follow up with Ortho following transplant for further evaluation.   - Continue Capcaicin cream prn.      Chronic/resolved conditions  COPD/Asthma: pta Breo Ellipta, albuterol prn.   Depression: pta Bupropion  Right sided weakness, resolved. CT head negative for acute findings. Appreciate Neuro eval.   Staph Hominis Bacteremia. No need for surveillance blood cx per transplant ID.    Rhinovirus, resolved. Bacterial  "bronchitis. Completed 5 day course of Cefdinir. COVID negative 2/8. Resp PCR +rhinovirus. CXR 2/12 with no overt PNA. Stopped cefdinir 2/15. Cleared for transplant per ID. Repeat RVP negative 3/5. COVID repeated due to sinus congestion, negative. Congestion improved with transition from Claritin to Zyrtec.   Carpel tunnel, bilateral s/p bilateral release. Evidence of thenar atrophy. Relief with steroid injection 11/20. Carpal tunnel release 2/18/21. Appreciate Ortho/Plastics consult, signed off. Continue Tylenol 650 mg po QID and Gabapentin 300 mg in AM, 600 mg in the afternoon, and 600 mg in the evening. Notified ortho of finger paresthesia, which is common after surgery per ortho. Lidocaine gel prn.      FEN: 3 gram sodium diet   PROPHY:  Coumadin  LINES:  PIV   DISPO:  TBD pending cardiac transplant.   CODE STATUS: Full Code   ================================================================    Interval History/ROS: He continues to complain of insomnia, improved with Trazodone. He complains of persistent right shoulder and 1-3 finger pain. He denies fever, chills, chest pain, palpitations, cough, nausea, vomiting, diarrhea, hematochezia, and melena. He complains of mild LE edema. He is tolerating oral intake and ambulation.     Last 24 hr care team notes reviewed.   ROS:  4 point ROS including Respiratory, CV, GI and , other than that noted in the HPI, is negative.     Medications: Reviewed in EPIC.     Physical Exam:   BP (!) 85/60   Pulse 79   Temp 98.9  F (37.2  C) (Oral)   Resp 18   Ht 1.727 m (5' 8\")   Wt 102.5 kg (226 lb)   SpO2 99%   BMI 34.36 kg/m    GENERAL: Appears alert and oriented times three.   HEENT: Eye symmetrical and free of discharge bilaterally. Mucous membranes moist and without lesions.  NECK: Supple and without lymphadenopathy. JVD lower 1/3 of neck upright.   CV: RRR, S1S2 present with LVAD hum.   RESPIRATORY: Respirations regular, even, and unlabored. Lungs CTA throughout.   GI: " Soft and non distended with normoactive bowel sounds present in all quadrants. No tenderness, rebound, guarding. No organomegaly.   EXTREMITIES: +1 bilateral LE peripheral edema. 2+ bilateral pedal pulses.   NEUROLOGIC: Alert and orientated x 3. CN II-XII grossly intact. No focal deficits.   MUSCULOSKELETAL: No joint swelling or tenderness.   SKIN: No jaundice. No rashes or lesions. LVAD drive line covered.     Data:  CMP  Recent Labs   Lab 04/16/21  0630 04/15/21  0553 04/14/21  0627 04/13/21  0609 04/12/21  0601 04/12/21  0601    133 134 136   < > 135   POTASSIUM 3.8 3.8 4.1 3.9   < > 3.9   CHLORIDE 103 100 100 102   < > 102   CO2 24 28 26 25   < > 26   ANIONGAP 8 6 8 9   < > 7   * 85 83 96   < > 91   BUN 48* 54* 46* 45*   < > 45*   CR 1.77* 1.85* 1.74* 1.72*   < > 1.60*   GFRESTIMATED 40* 38* 41* 41*   < > 45*   GFRESTBLACK 46* 44* 47* 48*   < > 52*   QAMAR 8.6 8.8 8.7 9.0   < > 8.9   MAG 2.5* 2.7* 2.5* 2.5*  --  2.4*   PROTTOTAL  --  6.6*  --   --   --  6.7*   ALBUMIN  --  3.6  --   --   --  3.7   BILITOTAL  --  0.6  --   --   --  0.7   ALKPHOS  --  100  --   --   --  110   AST  --  29  --   --   --  36   ALT  --  36  --   --   --  37    < > = values in this interval not displayed.     CBC  Recent Labs   Lab 04/15/21  0553 04/12/21  0601   WBC 4.2 4.5   RBC 3.91* 4.03*   HGB 11.5* 11.8*   HCT 36.6* 37.3*   MCV 94 93   MCH 29.4 29.3   MCHC 31.4* 31.6   RDW 15.5* 15.4*    171     INR  Recent Labs   Lab 04/16/21  0630 04/15/21  0553 04/14/21  0627 04/13/21  0609   INR 2.64* 2.36* 2.03* 1.88*       Patient discussed with Dr. Seth.      Soraya Marshall [facility]  4/16/2021

## 2021-04-16 NOTE — PROGRESS NOTES
D:Up sitting in chair for meals.   Eating 100% of meals.    Reports chronic pain in shoulders.   Medicated with ultram 50mg,  Robaxyn and tylenol with decrease in pain.  Actively participating in cardiac rehab.   Lungs are clear.  Has 1-2+ edema in  Ankles and legs.   LVAD  Site intact.  No redness,  Drainage or pain at site.       A;Doing well.   Swelling in lower extremities are slightly down.   No change in senstion.   Rhythm is unchanged.  NSR.  AVSS.  Condition is stable.    P:Continue diuretics as ordered.  Medicate prn pain shoulders.   Up ad francisco.   Monitor rhythm, temp and vital signs.   Assess LVAD site for signs of infection.   Activiity as tolerated.

## 2021-04-17 LAB
ANION GAP SERPL CALCULATED.3IONS-SCNC: 6 MMOL/L (ref 3–14)
ANION GAP SERPL CALCULATED.3IONS-SCNC: 6 MMOL/L (ref 3–14)
BUN SERPL-MCNC: 45 MG/DL (ref 7–30)
BUN SERPL-MCNC: 47 MG/DL (ref 7–30)
CALCIUM SERPL-MCNC: 8.9 MG/DL (ref 8.5–10.1)
CALCIUM SERPL-MCNC: 8.9 MG/DL (ref 8.5–10.1)
CHLORIDE SERPL-SCNC: 100 MMOL/L (ref 94–109)
CHLORIDE SERPL-SCNC: 101 MMOL/L (ref 94–109)
CO2 SERPL-SCNC: 26 MMOL/L (ref 20–32)
CO2 SERPL-SCNC: 28 MMOL/L (ref 20–32)
CREAT SERPL-MCNC: 1.67 MG/DL (ref 0.66–1.25)
CREAT SERPL-MCNC: 1.7 MG/DL (ref 0.66–1.25)
GFR SERPL CREATININE-BSD FRML MDRD: 42 ML/MIN/{1.73_M2}
GFR SERPL CREATININE-BSD FRML MDRD: 43 ML/MIN/{1.73_M2}
GLUCOSE BLDC GLUCOMTR-MCNC: 119 MG/DL (ref 70–99)
GLUCOSE BLDC GLUCOMTR-MCNC: 138 MG/DL (ref 70–99)
GLUCOSE BLDC GLUCOMTR-MCNC: 142 MG/DL (ref 70–99)
GLUCOSE BLDC GLUCOMTR-MCNC: 340 MG/DL (ref 70–99)
GLUCOSE SERPL-MCNC: 214 MG/DL (ref 70–99)
GLUCOSE SERPL-MCNC: 82 MG/DL (ref 70–99)
INR PPP: 2.6 (ref 0.86–1.14)
MAGNESIUM SERPL-MCNC: 2.5 MG/DL (ref 1.6–2.3)
POTASSIUM SERPL-SCNC: 3.9 MMOL/L (ref 3.4–5.3)
POTASSIUM SERPL-SCNC: 4 MMOL/L (ref 3.4–5.3)
SODIUM SERPL-SCNC: 132 MMOL/L (ref 133–144)
SODIUM SERPL-SCNC: 134 MMOL/L (ref 133–144)

## 2021-04-17 PROCEDURE — 85610 PROTHROMBIN TIME: CPT | Performed by: STUDENT IN AN ORGANIZED HEALTH CARE EDUCATION/TRAINING PROGRAM

## 2021-04-17 PROCEDURE — 250N000013 HC RX MED GY IP 250 OP 250 PS 637: Performed by: NURSE PRACTITIONER

## 2021-04-17 PROCEDURE — 36415 COLL VENOUS BLD VENIPUNCTURE: CPT | Performed by: STUDENT IN AN ORGANIZED HEALTH CARE EDUCATION/TRAINING PROGRAM

## 2021-04-17 PROCEDURE — 250N000013 HC RX MED GY IP 250 OP 250 PS 637: Performed by: INTERNAL MEDICINE

## 2021-04-17 PROCEDURE — 80048 BASIC METABOLIC PNL TOTAL CA: CPT | Performed by: STUDENT IN AN ORGANIZED HEALTH CARE EDUCATION/TRAINING PROGRAM

## 2021-04-17 PROCEDURE — 250N000013 HC RX MED GY IP 250 OP 250 PS 637: Performed by: STUDENT IN AN ORGANIZED HEALTH CARE EDUCATION/TRAINING PROGRAM

## 2021-04-17 PROCEDURE — 250N000009 HC RX 250: Performed by: NURSE PRACTITIONER

## 2021-04-17 PROCEDURE — 250N000011 HC RX IP 250 OP 636: Performed by: PHYSICIAN ASSISTANT

## 2021-04-17 PROCEDURE — 250N000013 HC RX MED GY IP 250 OP 250 PS 637: Performed by: PHYSICIAN ASSISTANT

## 2021-04-17 PROCEDURE — 93750 INTERROGATION VAD IN PERSON: CPT | Performed by: PHYSICIAN ASSISTANT

## 2021-04-17 PROCEDURE — 83735 ASSAY OF MAGNESIUM: CPT | Performed by: STUDENT IN AN ORGANIZED HEALTH CARE EDUCATION/TRAINING PROGRAM

## 2021-04-17 PROCEDURE — 214N000001 HC R&B CCU UMMC

## 2021-04-17 PROCEDURE — 80048 BASIC METABOLIC PNL TOTAL CA: CPT | Performed by: PHYSICIAN ASSISTANT

## 2021-04-17 PROCEDURE — 999N001017 HC STATISTIC GLUCOSE BY METER IP

## 2021-04-17 PROCEDURE — 36415 COLL VENOUS BLD VENIPUNCTURE: CPT | Performed by: PHYSICIAN ASSISTANT

## 2021-04-17 PROCEDURE — 99232 SBSQ HOSP IP/OBS MODERATE 35: CPT | Mod: 24 | Performed by: PHYSICIAN ASSISTANT

## 2021-04-17 RX ORDER — BUMETANIDE 0.25 MG/ML
4 INJECTION INTRAMUSCULAR; INTRAVENOUS 2 TIMES DAILY
Status: DISCONTINUED | OUTPATIENT
Start: 2021-04-17 | End: 2021-04-20

## 2021-04-17 RX ADMIN — GABAPENTIN 300 MG: 300 CAPSULE ORAL at 07:45

## 2021-04-17 RX ADMIN — ACARBOSE 25 MG: 25 TABLET ORAL at 07:00

## 2021-04-17 RX ADMIN — GABAPENTIN 600 MG: 300 CAPSULE ORAL at 21:52

## 2021-04-17 RX ADMIN — AMIODARONE HYDROCHLORIDE 200 MG: 200 TABLET ORAL at 07:46

## 2021-04-17 RX ADMIN — HYDRALAZINE HYDROCHLORIDE 75 MG: 50 TABLET ORAL at 07:45

## 2021-04-17 RX ADMIN — ISOSORBIDE DINITRATE 10 MG: 10 TABLET ORAL at 07:46

## 2021-04-17 RX ADMIN — CHLOROTHIAZIDE 250 MG: 250 SUSPENSION ORAL at 15:38

## 2021-04-17 RX ADMIN — CETIRIZINE HYDROCHLORIDE 10 MG: 10 TABLET, FILM COATED ORAL at 11:14

## 2021-04-17 RX ADMIN — METHOCARBAMOL 500 MG: 500 TABLET, FILM COATED ORAL at 21:44

## 2021-04-17 RX ADMIN — BUMETANIDE 4 MG: 2 TABLET ORAL at 07:45

## 2021-04-17 RX ADMIN — Medication 50 MG: at 14:13

## 2021-04-17 RX ADMIN — Medication 50 MG: at 04:21

## 2021-04-17 RX ADMIN — METHOCARBAMOL 500 MG: 500 TABLET, FILM COATED ORAL at 14:12

## 2021-04-17 RX ADMIN — BUPROPION HYDROCHLORIDE 100 MG: 100 TABLET, EXTENDED RELEASE ORAL at 19:36

## 2021-04-17 RX ADMIN — BUPROPION HYDROCHLORIDE 100 MG: 100 TABLET, EXTENDED RELEASE ORAL at 07:46

## 2021-04-17 RX ADMIN — ALLOPURINOL 300 MG: 300 TABLET ORAL at 07:47

## 2021-04-17 RX ADMIN — DOCUSATE SODIUM 50 MG AND SENNOSIDES 8.6 MG 1 TABLET: 8.6; 5 TABLET, FILM COATED ORAL at 19:36

## 2021-04-17 RX ADMIN — LIDOCAINE HYDROCHLORIDE: 20 JELLY TOPICAL at 04:21

## 2021-04-17 RX ADMIN — WARFARIN SODIUM 9 MG: 4 TABLET ORAL at 17:06

## 2021-04-17 RX ADMIN — ASPIRIN 81 MG CHEWABLE TABLET 81 MG: 81 TABLET CHEWABLE at 07:45

## 2021-04-17 RX ADMIN — POTASSIUM CHLORIDE 40 MEQ: 750 TABLET, EXTENDED RELEASE ORAL at 07:46

## 2021-04-17 RX ADMIN — ACETAMINOPHEN 650 MG: 325 TABLET, FILM COATED ORAL at 21:44

## 2021-04-17 RX ADMIN — POTASSIUM CHLORIDE 40 MEQ: 750 TABLET, EXTENDED RELEASE ORAL at 19:36

## 2021-04-17 RX ADMIN — HYDRALAZINE HYDROCHLORIDE 75 MG: 50 TABLET ORAL at 19:36

## 2021-04-17 RX ADMIN — FLUTICASONE FUROATE AND VILANTEROL TRIFENATATE 1 PUFF: 100; 25 POWDER RESPIRATORY (INHALATION) at 07:51

## 2021-04-17 RX ADMIN — ISOSORBIDE DINITRATE 10 MG: 10 TABLET ORAL at 19:37

## 2021-04-17 RX ADMIN — ACETAMINOPHEN 650 MG: 325 TABLET, FILM COATED ORAL at 14:13

## 2021-04-17 RX ADMIN — Medication 25 MG: at 21:44

## 2021-04-17 RX ADMIN — LIDOCAINE HYDROCHLORIDE: 20 JELLY TOPICAL at 17:06

## 2021-04-17 RX ADMIN — ISOSORBIDE DINITRATE 10 MG: 10 TABLET ORAL at 14:13

## 2021-04-17 RX ADMIN — GABAPENTIN 600 MG: 300 CAPSULE ORAL at 14:13

## 2021-04-17 RX ADMIN — Medication 50 MG: at 21:45

## 2021-04-17 RX ADMIN — BUMETANIDE 4 MG: 0.25 INJECTION INTRAMUSCULAR; INTRAVENOUS at 15:59

## 2021-04-17 RX ADMIN — ACETAMINOPHEN 650 MG: 325 TABLET, FILM COATED ORAL at 04:21

## 2021-04-17 RX ADMIN — ANAKINRA 100 MG: 100 INJECTION, SOLUTION SUBCUTANEOUS at 07:48

## 2021-04-17 RX ADMIN — MONTELUKAST 10 MG: 10 TABLET, FILM COATED ORAL at 21:44

## 2021-04-17 RX ADMIN — METHOCARBAMOL 500 MG: 500 TABLET, FILM COATED ORAL at 04:25

## 2021-04-17 RX ADMIN — HYDRALAZINE HYDROCHLORIDE 75 MG: 50 TABLET ORAL at 14:13

## 2021-04-17 RX ADMIN — Medication 10 MG: at 21:44

## 2021-04-17 RX ADMIN — UMECLIDINIUM 1 PUFF: 62.5 AEROSOL, POWDER ORAL at 07:51

## 2021-04-17 ASSESSMENT — ACTIVITIES OF DAILY LIVING (ADL)
ADLS_ACUITY_SCORE: 10

## 2021-04-17 ASSESSMENT — MIFFLIN-ST. JEOR: SCORE: 1810.05

## 2021-04-17 NOTE — PROGRESS NOTES
D:No complaints.   Lungs are clear.   Diminished in the bases.   Denies shortness  Of breath.    Has 2+ edema in the lower extremities.   Has  Baseline numbness or tingling in hands    No reportable change in  Sensation.  OOB to chair for meals.   Rhythm NSR.    A:No acute changes.   AVSS.   Rhythm is stable. LVAD number in therapeutic range.   No  Alarms.   System check completed.  (See Flow for details.).   Condition is stable.    P:Continue to assess status.   Strict I/lO   Monitor rhythm  And vital signs.   Up ad francisco.

## 2021-04-17 NOTE — PROGRESS NOTES
[facility]   Cardiology II Service / Advanced Heart Failure  Daily Progress Note      Patient: Jim Willingham  MRN: [id number]  Admission Date: 2/8/2021  Hospital Day # 68    Assessment and Plan: Jim Willingham is a 65 year old male with history of NICM EF 20% c/b VT s/p CRT-D s/p HMII LVAD 6/19/2017 originally intended as destination therapy 2/2 obesity however with recent weight loss now candidate for transplantation. He is admitted for worsening functional status and renal function concerning for worsening heart failure. He is now listed status 2E for transplant.    Today's changes:  - Change bumex from 4 mg PO BID to 4 mg IV BID  - Diuril 250 mg PO this afternoon     Chronic systolic heart failure secondary to NICM s/p HM II LVAD. Echo 1/8/21 at 9400 rpm, EF<30%m LVIDd 6.8cm, at least mildly reduced RV function, AoV closed without AI, mild-mod eccentric MR, dilated IVC without collapse. RHC 3/23 RA 10 mPA 25 mPCWP 14 Kee CO/CI 6.5/3.1.  Stage D, NYHA Class III  ACEi/ARB contraindicated due to renal dysfunction. Isordil 10 mg po TID. Hydralazine 75 mg po TID.   BB Contraindicated due to low cardiac output.   Aldosterone antagonist contraindicated due to renal dysfunction  SCD prophylaxis CRT-D  Fluid status: Euvolemic Bumex 4 mg po BID.   MAP: Goal 65-85, overall within goal today  LDH: 316 4/16  Anticoagulation: Coumadin per pharmacy. INR- 2.6, goal 2.5-3, goal increased in setting of power spikes and elevated flows this hospitalization. Will give higher dose of Coumadin today.   Antiplatelet: ASA 81 mg po daily  - remains on the cardiac transplant list status 2E. Persistent escalation of diuretics in setting of progressive heart failure with refractory hypervolemia.   - Status 2 Extension due 04/23/2021    WALTER on CKD Stage III. Cr peaked at 2.22.  - Cr- 1.67 (1.77).     History of Afib. History of VT/VF. Asymptomatic bursts of questionable AF vs NSVT without hemodynamic  compromise. RHC 3/23 RA 10 mPA 25 mPCWP 14 Kee CO/CI 6.5/3.1. CHADSVASC-5.   - Continue Amiodarone 200 mg po daily.   - Coumadin as above.      DM type II, controlled. Symptomatic Hypoglycemia, resolved. Low Cortisol: Hgb A1C-6.2. Lantus and Novololg sliding scale insulin discontinued. Endo consult appreciated. C-peptide 13.9 an Proinsulin 18.4. Serum cortisol 7.7 3/6/21, cosyntropin stim test WNL per Endo.   -Per endo --> Fingerstick glucose might not be accurate for hypoglycemia, if patient has glucose <55 (without insulin), plasma glucose should be sent for confirmation of hypoglycemia prior to correction. While the patient is off insulin therapy, POC glucose readings above 60 are acceptable for the patient  - Appreciate Endocrine consult  - Continue Acarbose 25 mg po daily.  - If recurrent episode pt amenable to increasing Acarbose to 50 mg po daily.      Bilateral Shoulder Pain, Right > Left. Prior history of osteoarthritis to right shoulder per Xray in 2018. He notes ortho evaluation in 2015 with CT consistent with right rotator cuff tear (unable to obtain MRI due to LVAD). Has had steroid injections in the past with some improvement. 3 view X-ray of right shoulder with mild osteoarthritis and bone spur.  - Tramadol prn  - Reviewed with Ortho with recommendations for conservative approach. Follow up with Ortho following transplant for further evaluation.   - Continue Capcaicin cream prn.      Chronic/resolved conditions  COPD/Asthma: pta Breo Ellipta, albuterol prn.   Depression: pta Bupropion  Right sided weakness, resolved. CT head negative for acute findings. Appreciate Neuro eval.   Staph Hominis Bacteremia. No need for surveillance blood cx per transplant ID.    Rhinovirus, resolved. Bacterial bronchitis. Completed 5 day course of Cefdinir. COVID negative 2/8. Resp PCR +rhinovirus. CXR 2/12 with no overt PNA. Stopped cefdinir 2/15. Cleared for transplant per ID. Repeat RVP negative  "3/5. COVID repeated due to sinus congestion, negative. Congestion improved with transition from Claritin to Zyrtec.   Carpel tunnel, bilateral s/p bilateral release. Evidence of thenar atrophy. Relief with steroid injection 11/20. Carpal tunnel release 2/18/21. Appreciate Ortho/Plastics consult, signed off. Continue Tylenol 650 mg po QID and Gabapentin 300 mg in AM, 600 mg in the afternoon, and 600 mg in the evening. Notified ortho of finger paresthesia, which is common after surgery per ortho. Lidocaine gel prn.      FEN: 3 gram sodium diet   PROPHY:  Coumadin  LINES:  PIV   DISPO:  TBD pending cardiac transplant.   CODE STATUS: Full Code   ================================================================    Interval History/ROS: He denies fever, chills, chest pain, palpitations, cough, nausea, vomiting, diarrhea, hematochezia, and melena. He complains of more LE edema and abdominal bloating today. Does not feel SOB. No cough. No PND. He is tolerating oral intake and ambulation.     Last 24 hr care team notes reviewed.   ROS:  4 point ROS including Respiratory, CV, GI and , other than that noted in the HPI, is negative.     Medications: Reviewed in EPIC.     Physical Exam:   BP (!) 79/68 (BP Location: Left arm)   Pulse 71   Temp 97.3  F (36.3  C) (Axillary)   Resp 18   Ht 1.727 m (5' 8\")   Wt 104.1 kg (229 lb 6.4 oz)   SpO2 96%   BMI 34.88 kg/m    GENERAL: Appears alert and interacting appropriatly.  HEENT: Eye symmetrical and free of discharge bilaterally. Mucous membranes moist and without lesions.  NECK: Supple and without lymphadenopathy. JVD midneck when upright.   CV: RRR, S1S2 present with LVAD hum.   RESPIRATORY: Respirations regular, even, and unlabored. Lungs CTA throughout.   GI: Soft and non distended with normoactive bowel sounds present in all quadrants. No tenderness, rebound, guarding. No organomegaly.   EXTREMITIES: +1 bilateral LE peripheral edema. 2+ bilateral pedal pulses.   NEUROLOGIC: " Alert and interacting appropriatly. No focal deficits.   MUSCULOSKELETAL: No joint swelling or tenderness.   SKIN: No jaundice. No rashes or lesions. LVAD drive line covered.     Data:  CMP  Recent Labs   Lab 04/17/21  0705 04/16/21  0630 04/15/21  0553 04/14/21  0627 04/12/21  0601 04/12/21  0601   * 134 133 134   < > 135   POTASSIUM 4.0 3.8 3.8 4.1   < > 3.9   CHLORIDE 101 103 100 100   < > 102   CO2 26 24 28 26   < > 26   ANIONGAP 6 8 6 8   < > 7   GLC 82 132* 85 83   < > 91   BUN 47* 48* 54* 46*   < > 45*   CR 1.67* 1.77* 1.85* 1.74*   < > 1.60*   GFRESTIMATED 43* 40* 38* 41*   < > 45*   GFRESTBLACK 49* 46* 44* 47*   < > 52*   QAMAR 8.9 8.6 8.8 8.7   < > 8.9   MAG 2.5* 2.5* 2.7* 2.5*   < > 2.4*   PROTTOTAL  --   --  6.6*  --   --  6.7*   ALBUMIN  --   --  3.6  --   --  3.7   BILITOTAL  --   --  0.6  --   --  0.7   ALKPHOS  --   --  100  --   --  110   AST  --   --  29  --   --  36   ALT  --   --  36  --   --  37    < > = values in this interval not displayed.     CBC  Recent Labs   Lab 04/15/21  0553 04/12/21  0601   WBC 4.2 4.5   RBC 3.91* 4.03*   HGB 11.5* 11.8*   HCT 36.6* 37.3*   MCV 94 93   MCH 29.4 29.3   MCHC 31.4* 31.6   RDW 15.5* 15.4*    171     INR  Recent Labs   Lab 04/17/21  0705 04/16/21  0630 04/15/21  0553 04/14/21  0627   INR 2.60* 2.64* 2.36* 2.03*       Patient discussed with Dr. Seth.      NAOMI YeboahC  [facility] Card[facility]

## 2021-04-17 NOTE — PLAN OF CARE
Temp: 98.2  F (36.8  C) Temp src: Oral BP: (!) 73/62 Pulse: 65   Resp: 16 SpO2: 97 % O2 Device: None (Room air)       D: Admitted with worsening HF, now status 2E awaiting transplant. Hx NICM s/p HM2 (2017), afib, RV failure, CKD, DM, CECILIA, HTN, asthma     I/A: Jim (he/him) is A&O x4. Tele in place, SR. VSS on RA, CPAP HS. VAD numbers WDL. PIV in place, SL. Tylenol, tramadol, and Robaxin given for pain. Up independently. Slept well overnight     P: Continue to monitor and follow POC. Notify Cards 2 with changes

## 2021-04-17 NOTE — PROGRESS NOTES
The patient's HeartMate LVAD was interrogated 4/17/2021  * Speed 9600 rpm   * Pulsatility index 4.0   * Power 6.5 Manuel   * Flow 5.9 L/minute   Fluid status: hypervolemic   Alarms were reviewed, and notable for moderate amount of PI events with rare associated speed drops.   The driveline exit site was inspected, c/d/i.   All external components were inspected and showed no evidence of damage or malfunction, none replaced.   No changes to VAD settings made

## 2021-04-17 NOTE — PLAN OF CARE
Shift summary  D:Pt  awaiting heart transplant.  A/I: Pt has been in paced rhythm, VSS. Pt HM numbers WNL. Pt up independently. Pt c/o shoulder and finger pain. Lidocaine gel applied to fingers with some relief. Pt's appetite good. FSBG WNL. Pt voiding in good amount  P: Continue to await heart trans plant, continue current monitoring

## 2021-04-18 LAB
ANION GAP SERPL CALCULATED.3IONS-SCNC: 8 MMOL/L (ref 3–14)
BUN SERPL-MCNC: 44 MG/DL (ref 7–30)
CALCIUM SERPL-MCNC: 8.6 MG/DL (ref 8.5–10.1)
CHLORIDE SERPL-SCNC: 100 MMOL/L (ref 94–109)
CO2 SERPL-SCNC: 26 MMOL/L (ref 20–32)
CREAT SERPL-MCNC: 1.68 MG/DL (ref 0.66–1.25)
GFR SERPL CREATININE-BSD FRML MDRD: 42 ML/MIN/{1.73_M2}
GLUCOSE BLDC GLUCOMTR-MCNC: 88 MG/DL (ref 70–99)
GLUCOSE BLDC GLUCOMTR-MCNC: 98 MG/DL (ref 70–99)
GLUCOSE SERPL-MCNC: 87 MG/DL (ref 70–99)
INR PPP: 2.81 (ref 0.86–1.14)
MAGNESIUM SERPL-MCNC: 2.5 MG/DL (ref 1.6–2.3)
POTASSIUM SERPL-SCNC: 3.7 MMOL/L (ref 3.4–5.3)
SODIUM SERPL-SCNC: 135 MMOL/L (ref 133–144)

## 2021-04-18 PROCEDURE — 83735 ASSAY OF MAGNESIUM: CPT | Performed by: STUDENT IN AN ORGANIZED HEALTH CARE EDUCATION/TRAINING PROGRAM

## 2021-04-18 PROCEDURE — 85610 PROTHROMBIN TIME: CPT | Performed by: STUDENT IN AN ORGANIZED HEALTH CARE EDUCATION/TRAINING PROGRAM

## 2021-04-18 PROCEDURE — 250N000013 HC RX MED GY IP 250 OP 250 PS 637: Performed by: STUDENT IN AN ORGANIZED HEALTH CARE EDUCATION/TRAINING PROGRAM

## 2021-04-18 PROCEDURE — 250N000013 HC RX MED GY IP 250 OP 250 PS 637: Performed by: NURSE PRACTITIONER

## 2021-04-18 PROCEDURE — 93750 INTERROGATION VAD IN PERSON: CPT | Performed by: PHYSICIAN ASSISTANT

## 2021-04-18 PROCEDURE — 80048 BASIC METABOLIC PNL TOTAL CA: CPT | Performed by: STUDENT IN AN ORGANIZED HEALTH CARE EDUCATION/TRAINING PROGRAM

## 2021-04-18 PROCEDURE — 250N000013 HC RX MED GY IP 250 OP 250 PS 637: Performed by: PHYSICIAN ASSISTANT

## 2021-04-18 PROCEDURE — 250N000013 HC RX MED GY IP 250 OP 250 PS 637: Performed by: INTERNAL MEDICINE

## 2021-04-18 PROCEDURE — 214N000001 HC R&B CCU UMMC

## 2021-04-18 PROCEDURE — 250N000009 HC RX 250: Performed by: NURSE PRACTITIONER

## 2021-04-18 PROCEDURE — 36415 COLL VENOUS BLD VENIPUNCTURE: CPT | Performed by: STUDENT IN AN ORGANIZED HEALTH CARE EDUCATION/TRAINING PROGRAM

## 2021-04-18 PROCEDURE — 250N000011 HC RX IP 250 OP 636: Performed by: PHYSICIAN ASSISTANT

## 2021-04-18 PROCEDURE — 999N001017 HC STATISTIC GLUCOSE BY METER IP

## 2021-04-18 PROCEDURE — 99232 SBSQ HOSP IP/OBS MODERATE 35: CPT | Mod: 24 | Performed by: PHYSICIAN ASSISTANT

## 2021-04-18 RX ORDER — POTASSIUM CHLORIDE 1.5 G/1.58G
20 POWDER, FOR SOLUTION ORAL ONCE
Status: COMPLETED | OUTPATIENT
Start: 2021-04-18 | End: 2021-04-18

## 2021-04-18 RX ORDER — POTASSIUM CHLORIDE 750 MG/1
20 TABLET, EXTENDED RELEASE ORAL ONCE
Status: COMPLETED | OUTPATIENT
Start: 2021-04-18 | End: 2021-04-18

## 2021-04-18 RX ORDER — WARFARIN SODIUM 4 MG/1
8 TABLET ORAL
Status: COMPLETED | OUTPATIENT
Start: 2021-04-18 | End: 2021-04-18

## 2021-04-18 RX ADMIN — DOCUSATE SODIUM 50 MG AND SENNOSIDES 8.6 MG 1 TABLET: 8.6; 5 TABLET, FILM COATED ORAL at 20:13

## 2021-04-18 RX ADMIN — Medication 50 MG: at 21:20

## 2021-04-18 RX ADMIN — ACETAMINOPHEN 650 MG: 325 TABLET, FILM COATED ORAL at 04:40

## 2021-04-18 RX ADMIN — ISOSORBIDE DINITRATE 10 MG: 10 TABLET ORAL at 08:22

## 2021-04-18 RX ADMIN — GABAPENTIN 600 MG: 300 CAPSULE ORAL at 13:27

## 2021-04-18 RX ADMIN — ASPIRIN 81 MG CHEWABLE TABLET 81 MG: 81 TABLET CHEWABLE at 08:23

## 2021-04-18 RX ADMIN — UMECLIDINIUM 1 PUFF: 62.5 AEROSOL, POWDER ORAL at 08:36

## 2021-04-18 RX ADMIN — ALLOPURINOL 300 MG: 300 TABLET ORAL at 08:22

## 2021-04-18 RX ADMIN — METHOCARBAMOL 500 MG: 500 TABLET, FILM COATED ORAL at 21:21

## 2021-04-18 RX ADMIN — GABAPENTIN 300 MG: 300 CAPSULE ORAL at 08:22

## 2021-04-18 RX ADMIN — FLUTICASONE FUROATE AND VILANTEROL TRIFENATATE 1 PUFF: 100; 25 POWDER RESPIRATORY (INHALATION) at 08:36

## 2021-04-18 RX ADMIN — CETIRIZINE HYDROCHLORIDE 10 MG: 10 TABLET, FILM COATED ORAL at 08:22

## 2021-04-18 RX ADMIN — ISOSORBIDE DINITRATE 10 MG: 10 TABLET ORAL at 20:13

## 2021-04-18 RX ADMIN — ALLOPURINOL 300 MG: 300 TABLET ORAL at 08:23

## 2021-04-18 RX ADMIN — HYDRALAZINE HYDROCHLORIDE 75 MG: 50 TABLET ORAL at 20:13

## 2021-04-18 RX ADMIN — POTASSIUM CHLORIDE 40 MEQ: 750 TABLET, EXTENDED RELEASE ORAL at 20:13

## 2021-04-18 RX ADMIN — MONTELUKAST 10 MG: 10 TABLET, FILM COATED ORAL at 21:21

## 2021-04-18 RX ADMIN — WARFARIN SODIUM 8 MG: 4 TABLET ORAL at 18:09

## 2021-04-18 RX ADMIN — METHOCARBAMOL 500 MG: 500 TABLET, FILM COATED ORAL at 10:44

## 2021-04-18 RX ADMIN — Medication 50 MG: at 04:40

## 2021-04-18 RX ADMIN — BUPROPION HYDROCHLORIDE 100 MG: 100 TABLET, EXTENDED RELEASE ORAL at 20:13

## 2021-04-18 RX ADMIN — BUMETANIDE 4 MG: 0.25 INJECTION INTRAMUSCULAR; INTRAVENOUS at 13:38

## 2021-04-18 RX ADMIN — Medication 50 MG: at 10:44

## 2021-04-18 RX ADMIN — HYDRALAZINE HYDROCHLORIDE 75 MG: 50 TABLET ORAL at 13:27

## 2021-04-18 RX ADMIN — Medication 25 MG: at 21:21

## 2021-04-18 RX ADMIN — METHOCARBAMOL 500 MG: 500 TABLET, FILM COATED ORAL at 04:40

## 2021-04-18 RX ADMIN — BUMETANIDE 4 MG: 0.25 INJECTION INTRAMUSCULAR; INTRAVENOUS at 08:27

## 2021-04-18 RX ADMIN — ANAKINRA 100 MG: 100 INJECTION, SOLUTION SUBCUTANEOUS at 08:38

## 2021-04-18 RX ADMIN — Medication 10 MG: at 21:20

## 2021-04-18 RX ADMIN — POTASSIUM CHLORIDE 40 MEQ: 750 TABLET, EXTENDED RELEASE ORAL at 08:21

## 2021-04-18 RX ADMIN — BUPROPION HYDROCHLORIDE 100 MG: 100 TABLET, EXTENDED RELEASE ORAL at 08:22

## 2021-04-18 RX ADMIN — AMIODARONE HYDROCHLORIDE 200 MG: 200 TABLET ORAL at 08:22

## 2021-04-18 RX ADMIN — CAPSAICIN: 0.75 CREAM TOPICAL at 10:46

## 2021-04-18 RX ADMIN — GABAPENTIN 600 MG: 300 CAPSULE ORAL at 21:20

## 2021-04-18 RX ADMIN — HYDRALAZINE HYDROCHLORIDE 75 MG: 50 TABLET ORAL at 08:21

## 2021-04-18 RX ADMIN — POTASSIUM CHLORIDE 20 MEQ: 750 TABLET, EXTENDED RELEASE ORAL at 12:37

## 2021-04-18 RX ADMIN — ACETAMINOPHEN 650 MG: 325 TABLET, FILM COATED ORAL at 21:20

## 2021-04-18 RX ADMIN — ACETAMINOPHEN 650 MG: 325 TABLET, FILM COATED ORAL at 10:44

## 2021-04-18 RX ADMIN — POTASSIUM CHLORIDE 20 MEQ: 1.5 POWDER, FOR SOLUTION ORAL at 09:11

## 2021-04-18 RX ADMIN — ACARBOSE 25 MG: 25 TABLET ORAL at 08:35

## 2021-04-18 RX ADMIN — ISOSORBIDE DINITRATE 10 MG: 10 TABLET ORAL at 13:27

## 2021-04-18 RX ADMIN — ACARBOSE 25 MG: 25 TABLET ORAL at 07:36

## 2021-04-18 ASSESSMENT — ACTIVITIES OF DAILY LIVING (ADL)
ADLS_ACUITY_SCORE: 10
ADLS_ACUITY_SCORE: 10
ADLS_ACUITY_SCORE: 11
ADLS_ACUITY_SCORE: 10

## 2021-04-18 ASSESSMENT — MIFFLIN-ST. JEOR: SCORE: 1785.56

## 2021-04-18 NOTE — PROGRESS NOTES
The patient's HeartMate LVAD was interrogated 4/18/2021  * Speed 9600 rpm   * Pulsatility index 3.5   * Power 6.4 Manuel   * Flow 5.8 L/minute   Fluid status: mild hypervolemia   Alarms were reviewed, and notable for many pi events, rares associated speed drops.   The driveline exit site was inspected, c/d/i.   All external components were inspected and showed no evidence of damage or malfunction, none replaced.   No changes to VAD settings made

## 2021-04-18 NOTE — PLAN OF CARE
Shift summary [id number]    D:  Pt is awaiting heart transplant, currently status 2 E.  A/I:  Pt has been in SR/paced, VSS/baseline. Pt reports feeling more swollen around abdomen and lower legs. Pt received dose of diuril po and IV Bumex Pt voiding in good amounts. Pt more tired and sleeping.Pt's FSBG stable - elevated result was after finishing eating. Pt c/o finger and shoulder pain, relieved with tylenol, tramadol and robaxin. Pt not using any   patches .  P:  Continue with current POC.

## 2021-04-18 NOTE — PLAN OF CARE
Temp: 98.2  F (36.8  C) Temp src: Oral BP: (!) 82/75 Pulse: 73   Resp: 16 SpO2: 98 % O2 Device: None (Room air)       D: Admitted with worsening HF, now status 2E awaiting transplant. Hx NICM s/p HM2 (2017), afib, RV failure, CKD, DM, CECILIA, HTN, asthma     I/A: Jim (he/him) is A&O x4. Tele in place, SR. VSS on RA, CPAP HS. VAD numbers WDL. PIV in place, SL. Tylenol, tramadol, and Robaxin given for pain. Up independently. Slept well overnight     P: Continue to monitor and follow POC. Notify Cards 2 with changes

## 2021-04-18 NOTE — PROGRESS NOTES
Neuro: A&Ox4.   Cardiac: SR. VSS.   Respiratory: Sating * on RA.  GI/: Adequate urine output. BM X1  Diet/appetite: Tolerating * diet. Eating well.  Activity:  Independently  up to chair.  Pain: At acceptable level on current regimen.   Medicated times one with plain tylenol,  robaxsyn  And utltram with decrease inpain.  Skin: No new deficits noted.  LVAD site intact  No alarms  This shift.  Condition is stanble.  LDA's:    Plan: Continue with POC. Notify primary team with changes.

## 2021-04-18 NOTE — PROGRESS NOTES
[facility]   Cardiology II Service / Advanced Heart Failure  Daily Progress Note      Patient: Jim Willingham  MRN: [id number]  Admission Date: 2/8/2021  Hospital Day # 69    Assessment and Plan: Jim Willingham is a 65 year old male with history of NICM EF 20% c/b VT s/p CRT-D s/p HMII LVAD 6/19/2017 originally intended as destination therapy 2/2 obesity however with recent weight loss now candidate for transplantation. He is admitted for worsening functional status and renal function concerning for worsening heart failure. He is now listed status 2E for transplant.    Today's changes:  - Continue bumex 4 mg IV BID  - Diuril 250 mg PO yesterday, held off for today, if weight uptrending will repeat tomorrow     Chronic systolic heart failure secondary to NICM s/p HM II LVAD. Echo 1/8/21 at 9400 rpm, EF<30%m LVIDd 6.8cm, at least mildly reduced RV function, AoV closed without AI, mild-mod eccentric MR, dilated IVC without collapse. RHC 3/23 RA 10 mPA 25 mPCWP 14 Kee CO/CI 6.5/3.1.  Stage D, NYHA Class III  ACEi/ARB contraindicated due to renal dysfunction. Isordil 10 mg po TID. Hydralazine 75 mg po TID.   BB Contraindicated due to low cardiac output.   Aldosterone antagonist contraindicated due to renal dysfunction  SCD prophylaxis CRT-D  Fluid status: Euvolemic Bumex 4 mg po BID.   MAP: Goal 65-85, overall within goal today  LDH: 316 4/16  Anticoagulation: Coumadin per pharmacy. INR- 2.81, goal 2.5-3, goal increased in setting of power spikes and elevated flows this hospitalization. Will give higher dose of Coumadin today.   Antiplatelet: ASA 81 mg po daily  - remains on the cardiac transplant list status 2E. Persistent escalation of diuretics in setting of progressive heart failure with refractory hypervolemia.   - Status 2 Extension due 04/23/2021    WALTER on CKD Stage III. Cr peaked at 2.22.  - Cr- 1.68 (1.67).     History of Afib. History of VT/VF. Asymptomatic bursts of questionable AF vs  NSVT without hemodynamic compromise. RHC 3/23 RA 10 mPA 25 mPCWP 14 Kee CO/CI 6.5/3.1. CHADSVASC-5.   - Continue Amiodarone 200 mg po daily.   - Coumadin as above.      DM type II, controlled. Symptomatic Hypoglycemia, resolved. Low Cortisol: Hgb A1C-6.2. Lantus and Novololg sliding scale insulin discontinued. Endo consult appreciated. C-peptide 13.9 an Proinsulin 18.4. Serum cortisol 7.7 3/6/21, cosyntropin stim test WNL per Endo.   -Per endo --> Fingerstick glucose might not be accurate for hypoglycemia, if patient has glucose <55 (without insulin), plasma glucose should be sent for confirmation of hypoglycemia prior to correction. While the patient is off insulin therapy, POC glucose readings above 60 are acceptable for the patient  - Appreciate Endocrine consult  - Continue Acarbose 25 mg po daily.  - If recurrent episode pt amenable to increasing Acarbose to 50 mg po daily.      Bilateral Shoulder Pain, Right > Left. Prior history of osteoarthritis to right shoulder per Xray in 2018. He notes ortho evaluation in 2015 with CT consistent with right rotator cuff tear (unable to obtain MRI due to LVAD). Has had steroid injections in the past with some improvement. 3 view X-ray of right shoulder with mild osteoarthritis and bone spur.  - Tramadol prn  - Reviewed with Ortho with recommendations for conservative approach. Follow up with Ortho following transplant for further evaluation.   - Continue Capcaicin cream prn.      Chronic/resolved conditions  COPD/Asthma: pta Breo Ellipta, albuterol prn.   Depression: pta Bupropion  Right sided weakness, resolved. CT head negative for acute findings. Appreciate Neuro eval.   Staph Hominis Bacteremia. No need for surveillance blood cx per transplant ID.    Rhinovirus, resolved. Bacterial bronchitis. Completed 5 day course of Cefdinir. COVID negative 2/8. Resp PCR +rhinovirus. CXR 2/12 with no overt PNA. Stopped cefdinir 2/15. Cleared for transplant per ID. Repeat RVP  "negative 3/5. COVID repeated due to sinus congestion, negative. Congestion improved with transition from Claritin to Zyrtec.   Carpel tunnel, bilateral s/p bilateral release. Evidence of thenar atrophy. Relief with steroid injection 11/20. Carpal tunnel release 2/18/21. Appreciate Ortho/Plastics consult, signed off. Continue Tylenol 650 mg po QID and Gabapentin 300 mg in AM, 600 mg in the afternoon, and 600 mg in the evening. Notified ortho of finger paresthesia, which is common after surgery per ortho. Lidocaine gel prn.      FEN: 3 gram sodium diet   PROPHY:  Coumadin  LINES:  PIV   DISPO:  TBD pending cardiac transplant.   CODE STATUS: Full Code   ================================================================    Interval History/ROS: He denies fever, chills, chest pain, palpitations, cough, nausea, vomiting, diarrhea, hematochezia, and melena. His LE edema and abdominal bloating are still present but better than yesterday. Does not feel SOB. No cough. No PND. He is tolerating oral intake and ambulation. Does have some fatigue.    Last 24 hr care team notes reviewed.   ROS:  4 point ROS including Respiratory, CV, GI and , other than that noted in the HPI, is negative.     Medications: Reviewed in EPIC.     Physical Exam:   BP 97/83 (BP Location: Right arm)   Pulse 61   Temp 97.4  F (36.3  C) (Oral)   Resp 16   Ht 1.727 m (5' 8\")   Wt 101.6 kg (224 lb)   SpO2 98%   BMI 34.06 kg/m    GENERAL: Appears alert and interacting appropriatly.  HEENT: Eye symmetrical and free of discharge bilaterally. Mucous membranes moist and without lesions.  NECK: Supple and without lymphadenopathy. JVD lower third of neck when upright  CV: RRR, S1S2 present with LVAD hum.   RESPIRATORY: Respirations regular, even, and unlabored. Lungs CTA throughout.   GI: Soft and non distended with normoactive bowel sounds present in all quadrants. No tenderness, rebound, guarding. No organomegaly.   EXTREMITIES: +1 bilateral LE peripheral " edema. 2+ bilateral pedal pulses.   NEUROLOGIC: Alert and interacting appropriatly. No focal deficits.   MUSCULOSKELETAL: No joint swelling or tenderness.   SKIN: No jaundice. No rashes or lesions. LVAD drive line covered.     Data:  CMP  Recent Labs   Lab 04/18/21  0656 04/17/21  1906 04/17/21  0705 04/16/21  0630 04/15/21  0553 04/12/21  0601 04/12/21  0601    134 132* 134 133   < > 135   POTASSIUM 3.7 3.9 4.0 3.8 3.8   < > 3.9   CHLORIDE 100 100 101 103 100   < > 102   CO2 26 28 26 24 28   < > 26   ANIONGAP 8 6 6 8 6   < > 7   GLC 87 214* 82 132* 85   < > 91   BUN 44* 45* 47* 48* 54*   < > 45*   CR 1.68* 1.70* 1.67* 1.77* 1.85*   < > 1.60*   GFRESTIMATED 42* 42* 43* 40* 38*   < > 45*   GFRESTBLACK 49* 48* 49* 46* 44*   < > 52*   QAMAR 8.6 8.9 8.9 8.6 8.8   < > 8.9   MAG 2.5*  --  2.5* 2.5* 2.7*   < > 2.4*   PROTTOTAL  --   --   --   --  6.6*  --  6.7*   ALBUMIN  --   --   --   --  3.6  --  3.7   BILITOTAL  --   --   --   --  0.6  --  0.7   ALKPHOS  --   --   --   --  100  --  110   AST  --   --   --   --  29  --  36   ALT  --   --   --   --  36  --  37    < > = values in this interval not displayed.     CBC  Recent Labs   Lab 04/15/21  0553 04/12/21  0601   WBC 4.2 4.5   RBC 3.91* 4.03*   HGB 11.5* 11.8*   HCT 36.6* 37.3*   MCV 94 93   MCH 29.4 29.3   MCHC 31.4* 31.6   RDW 15.5* 15.4*    171     INR  Recent Labs   Lab 04/18/21  0656 04/17/21  0705 04/16/21  0630 04/15/21  0553   INR 2.81* 2.60* 2.64* 2.36*       Patient discussed with Dr. Seth.      Didi Butler PA-C  [facility]

## 2021-04-19 LAB
ALBUMIN SERPL-MCNC: 3.6 G/DL (ref 3.4–5)
ALP SERPL-CCNC: 102 U/L (ref 40–150)
ALT SERPL W P-5'-P-CCNC: 36 U/L (ref 0–70)
ANION GAP SERPL CALCULATED.3IONS-SCNC: 7 MMOL/L (ref 3–14)
AST SERPL W P-5'-P-CCNC: 34 U/L (ref 0–45)
BILIRUB DIRECT SERPL-MCNC: 0.2 MG/DL (ref 0–0.2)
BILIRUB SERPL-MCNC: 0.8 MG/DL (ref 0.2–1.3)
BUN SERPL-MCNC: 46 MG/DL (ref 7–30)
CALCIUM SERPL-MCNC: 8.8 MG/DL (ref 8.5–10.1)
CHLORIDE SERPL-SCNC: 102 MMOL/L (ref 94–109)
CO2 SERPL-SCNC: 25 MMOL/L (ref 20–32)
CREAT SERPL-MCNC: 1.72 MG/DL (ref 0.66–1.25)
ERYTHROCYTE [DISTWIDTH] IN BLOOD BY AUTOMATED COUNT: 15.2 % (ref 10–15)
GFR SERPL CREATININE-BSD FRML MDRD: 41 ML/MIN/{1.73_M2}
GLUCOSE BLDC GLUCOMTR-MCNC: 117 MG/DL (ref 70–99)
GLUCOSE BLDC GLUCOMTR-MCNC: 123 MG/DL (ref 70–99)
GLUCOSE BLDC GLUCOMTR-MCNC: 140 MG/DL (ref 70–99)
GLUCOSE BLDC GLUCOMTR-MCNC: 150 MG/DL (ref 70–99)
GLUCOSE BLDC GLUCOMTR-MCNC: 43 MG/DL (ref 70–99)
GLUCOSE BLDC GLUCOMTR-MCNC: 51 MG/DL (ref 70–99)
GLUCOSE BLDC GLUCOMTR-MCNC: 88 MG/DL (ref 70–99)
GLUCOSE SERPL-MCNC: 90 MG/DL (ref 70–99)
HCT VFR BLD AUTO: 38 % (ref 40–53)
HGB BLD-MCNC: 12.2 G/DL (ref 13.3–17.7)
INR PPP: 2.84 (ref 0.86–1.14)
MAGNESIUM SERPL-MCNC: 2.6 MG/DL (ref 1.6–2.3)
MCH RBC QN AUTO: 30.6 PG (ref 26.5–33)
MCHC RBC AUTO-ENTMCNC: 32.1 G/DL (ref 31.5–36.5)
MCV RBC AUTO: 95 FL (ref 78–100)
PLATELET # BLD AUTO: 175 10E9/L (ref 150–450)
POTASSIUM SERPL-SCNC: 3.9 MMOL/L (ref 3.4–5.3)
PROT SERPL-MCNC: 6.7 G/DL (ref 6.8–8.8)
RBC # BLD AUTO: 3.99 10E12/L (ref 4.4–5.9)
SODIUM SERPL-SCNC: 134 MMOL/L (ref 133–144)
WBC # BLD AUTO: 4.5 10E9/L (ref 4–11)

## 2021-04-19 PROCEDURE — 99232 SBSQ HOSP IP/OBS MODERATE 35: CPT | Mod: 24 | Performed by: PHYSICIAN ASSISTANT

## 2021-04-19 PROCEDURE — 84132 ASSAY OF SERUM POTASSIUM: CPT | Performed by: STUDENT IN AN ORGANIZED HEALTH CARE EDUCATION/TRAINING PROGRAM

## 2021-04-19 PROCEDURE — 250N000013 HC RX MED GY IP 250 OP 250 PS 637: Performed by: NURSE PRACTITIONER

## 2021-04-19 PROCEDURE — 250N000013 HC RX MED GY IP 250 OP 250 PS 637: Performed by: STUDENT IN AN ORGANIZED HEALTH CARE EDUCATION/TRAINING PROGRAM

## 2021-04-19 PROCEDURE — 80048 BASIC METABOLIC PNL TOTAL CA: CPT | Performed by: STUDENT IN AN ORGANIZED HEALTH CARE EDUCATION/TRAINING PROGRAM

## 2021-04-19 PROCEDURE — 250N000013 HC RX MED GY IP 250 OP 250 PS 637: Performed by: INTERNAL MEDICINE

## 2021-04-19 PROCEDURE — 80076 HEPATIC FUNCTION PANEL: CPT | Performed by: STUDENT IN AN ORGANIZED HEALTH CARE EDUCATION/TRAINING PROGRAM

## 2021-04-19 PROCEDURE — 250N000009 HC RX 250: Performed by: NURSE PRACTITIONER

## 2021-04-19 PROCEDURE — 80076 HEPATIC FUNCTION PANEL: CPT | Performed by: NURSE PRACTITIONER

## 2021-04-19 PROCEDURE — 999N001017 HC STATISTIC GLUCOSE BY METER IP

## 2021-04-19 PROCEDURE — 250N000013 HC RX MED GY IP 250 OP 250 PS 637: Performed by: PHYSICIAN ASSISTANT

## 2021-04-19 PROCEDURE — 83735 ASSAY OF MAGNESIUM: CPT | Performed by: STUDENT IN AN ORGANIZED HEALTH CARE EDUCATION/TRAINING PROGRAM

## 2021-04-19 PROCEDURE — 36415 COLL VENOUS BLD VENIPUNCTURE: CPT | Performed by: STUDENT IN AN ORGANIZED HEALTH CARE EDUCATION/TRAINING PROGRAM

## 2021-04-19 PROCEDURE — 93750 INTERROGATION VAD IN PERSON: CPT | Performed by: PHYSICIAN ASSISTANT

## 2021-04-19 PROCEDURE — 250N000011 HC RX IP 250 OP 636: Performed by: PHYSICIAN ASSISTANT

## 2021-04-19 PROCEDURE — 85610 PROTHROMBIN TIME: CPT | Performed by: STUDENT IN AN ORGANIZED HEALTH CARE EDUCATION/TRAINING PROGRAM

## 2021-04-19 PROCEDURE — 214N000001 HC R&B CCU UMMC

## 2021-04-19 PROCEDURE — 85027 COMPLETE CBC AUTOMATED: CPT | Performed by: STUDENT IN AN ORGANIZED HEALTH CARE EDUCATION/TRAINING PROGRAM

## 2021-04-19 RX ORDER — DEXTROSE MONOHYDRATE 25 G/50ML
INJECTION, SOLUTION INTRAVENOUS
Status: DISCONTINUED
Start: 2021-04-19 | End: 2021-04-20 | Stop reason: HOSPADM

## 2021-04-19 RX ORDER — WARFARIN SODIUM 4 MG/1
8 TABLET ORAL
Status: COMPLETED | OUTPATIENT
Start: 2021-04-19 | End: 2021-04-19

## 2021-04-19 RX ADMIN — ISOSORBIDE DINITRATE 10 MG: 10 TABLET ORAL at 08:38

## 2021-04-19 RX ADMIN — FLUTICASONE FUROATE AND VILANTEROL TRIFENATATE 1 PUFF: 100; 25 POWDER RESPIRATORY (INHALATION) at 09:49

## 2021-04-19 RX ADMIN — BUPROPION HYDROCHLORIDE 100 MG: 100 TABLET, EXTENDED RELEASE ORAL at 19:08

## 2021-04-19 RX ADMIN — Medication 50 MG: at 22:05

## 2021-04-19 RX ADMIN — HYDRALAZINE HYDROCHLORIDE 75 MG: 50 TABLET ORAL at 19:07

## 2021-04-19 RX ADMIN — GABAPENTIN 600 MG: 300 CAPSULE ORAL at 22:05

## 2021-04-19 RX ADMIN — ASPIRIN 81 MG CHEWABLE TABLET 81 MG: 81 TABLET CHEWABLE at 08:37

## 2021-04-19 RX ADMIN — GABAPENTIN 300 MG: 300 CAPSULE ORAL at 08:38

## 2021-04-19 RX ADMIN — MONTELUKAST 10 MG: 10 TABLET, FILM COATED ORAL at 22:05

## 2021-04-19 RX ADMIN — POTASSIUM CHLORIDE 40 MEQ: 750 TABLET, EXTENDED RELEASE ORAL at 19:07

## 2021-04-19 RX ADMIN — Medication 50 MG: at 16:03

## 2021-04-19 RX ADMIN — WARFARIN SODIUM 8 MG: 4 TABLET ORAL at 19:07

## 2021-04-19 RX ADMIN — HYDRALAZINE HYDROCHLORIDE 75 MG: 50 TABLET ORAL at 14:06

## 2021-04-19 RX ADMIN — ACETAMINOPHEN 650 MG: 325 TABLET, FILM COATED ORAL at 06:02

## 2021-04-19 RX ADMIN — UMECLIDINIUM 1 PUFF: 62.5 AEROSOL, POWDER ORAL at 09:48

## 2021-04-19 RX ADMIN — METHOCARBAMOL 500 MG: 500 TABLET, FILM COATED ORAL at 22:05

## 2021-04-19 RX ADMIN — POTASSIUM CHLORIDE 40 MEQ: 750 TABLET, EXTENDED RELEASE ORAL at 08:37

## 2021-04-19 RX ADMIN — Medication 50 MG: at 06:02

## 2021-04-19 RX ADMIN — AMIODARONE HYDROCHLORIDE 200 MG: 200 TABLET ORAL at 08:38

## 2021-04-19 RX ADMIN — GABAPENTIN 600 MG: 300 CAPSULE ORAL at 14:06

## 2021-04-19 RX ADMIN — Medication 10 MG: at 22:05

## 2021-04-19 RX ADMIN — ACETAMINOPHEN 650 MG: 325 TABLET, FILM COATED ORAL at 13:04

## 2021-04-19 RX ADMIN — LIDOCAINE HYDROCHLORIDE 1 ML: 20 JELLY TOPICAL at 13:04

## 2021-04-19 RX ADMIN — Medication 25 MG: at 22:05

## 2021-04-19 RX ADMIN — METHOCARBAMOL 500 MG: 500 TABLET, FILM COATED ORAL at 16:03

## 2021-04-19 RX ADMIN — ISOSORBIDE DINITRATE 10 MG: 10 TABLET ORAL at 19:08

## 2021-04-19 RX ADMIN — METHOCARBAMOL 500 MG: 500 TABLET, FILM COATED ORAL at 06:02

## 2021-04-19 RX ADMIN — BUMETANIDE 4 MG: 0.25 INJECTION INTRAMUSCULAR; INTRAVENOUS at 08:39

## 2021-04-19 RX ADMIN — BUMETANIDE 4 MG: 0.25 INJECTION INTRAMUSCULAR; INTRAVENOUS at 14:06

## 2021-04-19 RX ADMIN — LIDOCAINE HYDROCHLORIDE: 20 JELLY TOPICAL at 19:09

## 2021-04-19 RX ADMIN — HYDRALAZINE HYDROCHLORIDE 75 MG: 50 TABLET ORAL at 08:49

## 2021-04-19 RX ADMIN — BUPROPION HYDROCHLORIDE 100 MG: 100 TABLET, EXTENDED RELEASE ORAL at 08:37

## 2021-04-19 RX ADMIN — ALBUTEROL SULFATE 2 PUFF: 90 AEROSOL, METERED RESPIRATORY (INHALATION) at 22:05

## 2021-04-19 RX ADMIN — ALLOPURINOL 300 MG: 300 TABLET ORAL at 08:38

## 2021-04-19 RX ADMIN — CETIRIZINE HYDROCHLORIDE 10 MG: 10 TABLET, FILM COATED ORAL at 08:38

## 2021-04-19 RX ADMIN — ACARBOSE 25 MG: 25 TABLET ORAL at 06:02

## 2021-04-19 RX ADMIN — ACETAMINOPHEN 650 MG: 325 TABLET, FILM COATED ORAL at 22:05

## 2021-04-19 RX ADMIN — ISOSORBIDE DINITRATE 10 MG: 10 TABLET ORAL at 14:06

## 2021-04-19 RX ADMIN — DOCUSATE SODIUM 50 MG AND SENNOSIDES 8.6 MG 1 TABLET: 8.6; 5 TABLET, FILM COATED ORAL at 19:08

## 2021-04-19 RX ADMIN — ANAKINRA 100 MG: 100 INJECTION, SOLUTION SUBCUTANEOUS at 10:22

## 2021-04-19 RX ADMIN — CAPSAICIN: 0.75 CREAM TOPICAL at 08:54

## 2021-04-19 ASSESSMENT — ACTIVITIES OF DAILY LIVING (ADL)
ADLS_ACUITY_SCORE: 11

## 2021-04-19 ASSESSMENT — MIFFLIN-ST. JEOR: SCORE: 1781.02

## 2021-04-19 NOTE — PROGRESS NOTES
[facility]   Cardiology II Service / Advanced Heart Failure  Daily Progress Note      Patient: Jim Willingham  MRN: [id number]  Admission Date: 2/8/2021  Hospital Day # 70    Assessment and Plan: Jim Willingham is a 65 year old male with history of NICM EF 20% c/b VT s/p CRT-D s/p HMII LVAD 6/19/2017 originally intended as destination therapy 2/2 obesity however with recent weight loss now candidate for transplantation. He is admitted for worsening functional status and renal function concerning for worsening heart failure. He is now listed status 2E for transplant.    Today's changes:  - Continue bumex 4 mg  BID  - Diuril 250 PO today     Chronic systolic heart failure secondary to NICM s/p HM II LVAD. Echo 1/8/21 at 9400 rpm, EF<30%m LVIDd 6.8cm, at least mildly reduced RV function, AoV closed without AI, mild-mod eccentric MR, dilated IVC without collapse. RHC 3/23 RA 10 mPA 25 mPCWP 14 Kee CO/CI 6.5/3.1.  Stage D, NYHA Class III  ACEi/ARB contraindicated due to renal dysfunction. Isordil 10 mg po TID. Hydralazine 75 mg po TID.   BB Contraindicated due to low cardiac output.   Aldosterone antagonist contraindicated due to renal dysfunction  SCD prophylaxis CRT-D  Fluid status: Euvolemic Bumex 4 mg po BID.   MAP: Goal 65-85, overall within goal today  LDH: 316 4/16  Anticoagulation: Coumadin per pharmacy. INR- 2.81, goal 2.5-3, goal increased in setting of power spikes and elevated flows this hospitalization. Will give higher dose of Coumadin today.   Antiplatelet: ASA 81 mg po daily  - remains on the cardiac transplant list status 2E. Persistent escalation of diuretics in setting of progressive heart failure with refractory hypervolemia.   - Status 2 Extension due 04/23/2021    WALTER on CKD Stage III. Cr peaked at 2.22.  - Cr- 1.72 (1.68).     History of Afib. History of VT/VF. Asymptomatic bursts of questionable AF vs NSVT without hemodynamic compromise. RHC 3/23 RA 10 mPA 25 mPCWP 14 Kee CO/CI  6.5/3.1. CHADSVASC-5.   - Continue Amiodarone 200 mg po daily.   - Coumadin as above.      DM type II, controlled. Symptomatic Hypoglycemia, resolved. Low Cortisol: Hgb A1C-6.2. Lantus and Novololg sliding scale insulin discontinued. Endo consult appreciated. C-peptide 13.9 an Proinsulin 18.4. Serum cortisol 7.7 3/6/21, cosyntropin stim test WNL per Endo.   -Per endo --> Fingerstick glucose might not be accurate for hypoglycemia, if patient has glucose <55 (without insulin), plasma glucose should be sent for confirmation of hypoglycemia prior to correction. While the patient is off insulin therapy, POC glucose readings above 60 are acceptable for the patient  - Appreciate Endocrine consult  - Continue Acarbose 25 mg po daily.  - If recurrent episode pt amenable to increasing Acarbose to 50 mg po daily.      Bilateral Shoulder Pain, Right > Left. Prior history of osteoarthritis to right shoulder per Xray in 2018. He notes ortho evaluation in 2015 with CT consistent with right rotator cuff tear (unable to obtain MRI due to LVAD). Has had steroid injections in the past with some improvement. 3 view X-ray of right shoulder with mild osteoarthritis and bone spur.  - Tramadol prn  - Reviewed with Ortho with recommendations for conservative approach. Follow up with Ortho following transplant for further evaluation.   - Continue Capcaicin cream prn.      Chronic/resolved conditions  COPD/Asthma: pta Breo Ellipta, albuterol prn.   Depression: pta Bupropion  Right sided weakness, resolved. CT head negative for acute findings. Appreciate Neuro eval.   Staph Hominis Bacteremia. No need for surveillance blood cx per transplant ID.    Rhinovirus, resolved. Bacterial bronchitis. Completed 5 day course of Cefdinir. COVID negative 2/8. Resp PCR +rhinovirus. CXR 2/12 with no overt PNA. Stopped cefdinir 2/15. Cleared for transplant per ID. Repeat RVP negative 3/5. COVID repeated due to sinus congestion, negative. Congestion improved  "with transition from Claritin to Zyrtec.   Carpel tunnel, bilateral s/p bilateral release. Evidence of thenar atrophy. Relief with steroid injection 11/20. Carpal tunnel release 2/18/21. Appreciate Ortho/Plastics consult, signed off. Continue Tylenol 650 mg po QID and Gabapentin 300 mg in AM, 600 mg in the afternoon, and 600 mg in the evening. Notified ortho of finger paresthesia, which is common after surgery per ortho. Lidocaine gel prn.      FEN: 3 gram sodium diet   PROPHY:  Coumadin  LINES:  PIV   DISPO:  TBD pending cardiac transplant.   CODE STATUS: Full Code   ================================================================    Interval History/ROS: He denies fever, chills, chest pain, palpitations, cough, nausea, vomiting, diarrhea, hematochezia, and melena. His LE edema and abdominal bloating are worse than yesterday. Feels more SO. No cough. No PND. He is tolerating oral intake and ambulation. Does have some fatigue.    Last 24 hr care team notes reviewed.   ROS:  4 point ROS including Respiratory, CV, GI and , other than that noted in the HPI, is negative.     Medications: Reviewed in EPIC.     Physical Exam:   BP 91/65 (BP Location: Right arm)   Pulse 76   Temp 97.8  F (36.6  C) (Oral)   Resp 16   Ht 1.727 m (5' 8\")   Wt 101.2 kg (223 lb)   SpO2 97%   BMI 33.91 kg/m    GENERAL: Appears alert and interacting appropriatly.  HEENT: Eye symmetrical and free of discharge bilaterally. Mucous membranes moist and without lesions.  NECK: Supple and without lymphadenopathy. JVD lower third of neck when upright  CV: RRR, S1S2 present with LVAD hum.   RESPIRATORY: Respirations regular, even, and unlabored. Lungs CTA throughout.   GI: Soft and non distended with normoactive bowel sounds present in all quadrants. No tenderness, rebound, guarding. No organomegaly.   EXTREMITIES: +1 bilateral LE peripheral edema. 2+ bilateral pedal pulses.   NEUROLOGIC: Alert and interacting appropriatly. No focal deficits. "   MUSCULOSKELETAL: No joint swelling or tenderness.   SKIN: No jaundice. No rashes or lesions. LVAD drive line covered.     Data:  CMP  Recent Labs   Lab 04/19/21  0607 04/18/21  0656 04/17/21  1906 04/17/21  0705 04/16/21  0630 04/15/21  0553    135 134 132* 134 133   POTASSIUM 3.9 3.7 3.9 4.0 3.8 3.8   CHLORIDE 102 100 100 101 103 100   CO2 25 26 28 26 24 28   ANIONGAP 7 8 6 6 8 6   GLC 90 87 214* 82 132* 85   BUN 46* 44* 45* 47* 48* 54*   CR 1.72* 1.68* 1.70* 1.67* 1.77* 1.85*   GFRESTIMATED 41* 42* 42* 43* 40* 38*   GFRESTBLACK 48* 49* 48* 49* 46* 44*   QAMAR 8.8 8.6 8.9 8.9 8.6 8.8   MAG 2.6* 2.5*  --  2.5* 2.5* 2.7*   PROTTOTAL 6.7*  --   --   --   --  6.6*   ALBUMIN 3.6  --   --   --   --  3.6   BILITOTAL 0.8  --   --   --   --  0.6   ALKPHOS 102  --   --   --   --  100   AST 34  --   --   --   --  29   ALT 36  --   --   --   --  36     CBC  Recent Labs   Lab 04/19/21  0607 04/15/21  0553   WBC 4.5 4.2   RBC 3.99* 3.91*   HGB 12.2* 11.5*   HCT 38.0* 36.6*   MCV 95 94   MCH 30.6 29.4   MCHC 32.1 31.4*   RDW 15.2* 15.5*    168     INR  Recent Labs   Lab 04/19/21  0607 04/18/21  0656 04/17/21  0705 04/16/21  0630   INR 2.84* 2.81* 2.60* 2.64*       Patient discussed with Dr. Seth.      Didi Butler PAJanetC  [facility]

## 2021-04-19 NOTE — PROGRESS NOTES
[facility]   Cardiology II Service / Advanced Heart Failure  Daily Progress Note      Patient: Jim Willingham  MRN: [id number]  Admission Date: 2/8/2021  Hospital Day # 70    Assessment and Plan: Jim Willingham is a 65 year old male with history of NICM EF 20% c/b VT s/p CRT-D s/p HMII LVAD 6/19/2017 originally intended as destination therapy 2/2 obesity however with recent weight loss now candidate for transplantation. He is admitted for worsening functional status and renal function concerning for worsening heart failure. He is now listed status 2E for transplant.    Today's changes:  - Continue bumex 4 mg IV BID     Chronic systolic heart failure secondary to NICM s/p HM II LVAD. Echo 1/8/21 at 9400 rpm, EF<30%m LVIDd 6.8cm, at least mildly reduced RV function, AoV closed without AI, mild-mod eccentric MR, dilated IVC without collapse. RHC 3/23 RA 10 mPA 25 mPCWP 14 Kee CO/CI 6.5/3.1.  Stage D, NYHA Class III  ACEi/ARB contraindicated due to renal dysfunction. Isordil 10 mg po TID. Hydralazine 75 mg po TID.   BB Contraindicated due to low cardiac output.   Aldosterone antagonist contraindicated due to renal dysfunction  SCD prophylaxis CRT-D  Fluid status: Euvolemic Bumex 4 mg po BID.   MAP: Goal 65-85, overall within goal today  LDH: 316 4/16  Anticoagulation: Coumadin per pharmacy. INR- 2.81, goal 2.5-3, goal increased in setting of power spikes and elevated flows this hospitalization. Will give higher dose of Coumadin today.   Antiplatelet: ASA 81 mg po daily  - remains on the cardiac transplant list status 2E. Persistent escalation of diuretics in setting of progressive heart failure with refractory hypervolemia.   - Status 2 Extension due 04/23/2021    WALTER on CKD Stage III. Cr peaked at 2.22.  - Cr- 1.72 (1.68).     History of Afib. History of VT/VF. Asymptomatic bursts of questionable AF vs NSVT without hemodynamic compromise. RHC 3/23 RA 10 mPA 25 mPCWP 14 Kee CO/CI 6.5/3.1. CHADSVASC-5.    - Continue Amiodarone 200 mg po daily.   - Coumadin as above.      DM type II, controlled. Symptomatic Hypoglycemia, resolved. Low Cortisol: Hgb A1C-6.2. Lantus and Novololg sliding scale insulin discontinued. Endo consult appreciated. C-peptide 13.9 an Proinsulin 18.4. Serum cortisol 7.7 3/6/21, cosyntropin stim test WNL per Endo.   -Per endo --> Fingerstick glucose might not be accurate for hypoglycemia, if patient has glucose <55 (without insulin), plasma glucose should be sent for confirmation of hypoglycemia prior to correction. While the patient is off insulin therapy, POC glucose readings above 60 are acceptable for the patient  - Appreciate Endocrine consult  - Continue Acarbose 25 mg po daily.  - If recurrent episode pt amenable to increasing Acarbose to 50 mg po daily.      Bilateral Shoulder Pain, Right > Left. Prior history of osteoarthritis to right shoulder per Xray in 2018. He notes ortho evaluation in 2015 with CT consistent with right rotator cuff tear (unable to obtain MRI due to LVAD). Has had steroid injections in the past with some improvement. 3 view X-ray of right shoulder with mild osteoarthritis and bone spur.  - Tramadol prn  - Reviewed with Ortho with recommendations for conservative approach. Follow up with Ortho following transplant for further evaluation.   - Continue Capcaicin cream prn.      Chronic/resolved conditions  COPD/Asthma: pta Breo Ellipta, albuterol prn.   Depression: pta Bupropion  Right sided weakness, resolved. CT head negative for acute findings. Appreciate Neuro eval.   Staph Hominis Bacteremia. No need for surveillance blood cx per transplant ID.    Rhinovirus, resolved. Bacterial bronchitis. Completed 5 day course of Cefdinir. COVID negative 2/8. Resp PCR +rhinovirus. CXR 2/12 with no overt PNA. Stopped cefdinir 2/15. Cleared for transplant per ID. Repeat RVP negative 3/5. COVID repeated due to sinus congestion, negative. Congestion improved with transition from  "Claritin to [facility].   Carpel tunnel, bilateral s/p bilateral release. Evidence of thenar atrophy. Relief with steroid injection 11/20. Carpal tunnel release 2/18/21. Appreciate Ortho/Plastics consult, signed off. Continue Tylenol 650 mg po QID and Gabapentin 300 mg in AM, 600 mg in the afternoon, and 600 mg in the evening. Notified ortho of finger paresthesia, which is common after surgery per ortho. Lidocaine gel prn.      FEN: 3 gram sodium diet   PROPHY:  Coumadin  LINES:  PIV   DISPO:  TBD pending cardiac transplant.   CODE STATUS: Full Code   ================================================================    Interval History/ROS: He denies fever, chills, chest pain, palpitations, cough, nausea, vomiting, diarrhea, hematochezia, and melena. His LE edema and abdominal bloating are still present but better than yesterday. Does not feel SOB. No cough. No PND. He is tolerating oral intake and ambulation.     Last 24 hr care team notes reviewed.   ROS:  4 point ROS including Respiratory, CV, GI and , other than that noted in the HPI, is negative.     Medications: Reviewed in EPIC.     Physical Exam:   BP 91/65 (BP Location: Right arm)   Pulse 76   Temp 97.8  F (36.6  C) (Oral)   Resp 16   Ht 1.727 m (5' 8\")   Wt 101.2 kg (223 lb)   SpO2 97%   BMI 33.91 kg/m    GENERAL: Appears alert and interacting appropriatly.  HEENT: Eye symmetrical and free of discharge bilaterally. Mucous membranes moist and without lesions.  NECK: Supple and without lymphadenopathy. JVD lower third of neck when upright  CV: RRR, S1S2 present with LVAD hum.   RESPIRATORY: Respirations regular, even, and unlabored. Lungs CTA throughout.   GI: Soft and non distended with normoactive bowel sounds present in all quadrants. No tenderness, rebound, guarding. No organomegaly.   EXTREMITIES: Trace bilateral LE peripheral edema. 2+ bilateral pedal pulses.   NEUROLOGIC: Alert and interacting appropriatly. No focal deficits.   MUSCULOSKELETAL: No " joint swelling or tenderness.   SKIN: No jaundice. No rashes or lesions. LVAD drive line covered.     Data:  CMP  Recent Labs   Lab 04/19/21  0607 04/18/21  0656 04/17/21  1906 04/17/21  0705 04/16/21  0630 04/15/21  0553    135 134 132* 134 133   POTASSIUM 3.9 3.7 3.9 4.0 3.8 3.8   CHLORIDE 102 100 100 101 103 100   CO2 25 26 28 26 24 28   ANIONGAP 7 8 6 6 8 6   GLC 90 87 214* 82 132* 85   BUN 46* 44* 45* 47* 48* 54*   CR 1.72* 1.68* 1.70* 1.67* 1.77* 1.85*   GFRESTIMATED 41* 42* 42* 43* 40* 38*   GFRESTBLACK 48* 49* 48* 49* 46* 44*   QAMAR 8.8 8.6 8.9 8.9 8.6 8.8   MAG 2.6* 2.5*  --  2.5* 2.5* 2.7*   PROTTOTAL 6.7*  --   --   --   --  6.6*   ALBUMIN 3.6  --   --   --   --  3.6   BILITOTAL 0.8  --   --   --   --  0.6   ALKPHOS 102  --   --   --   --  100   AST 34  --   --   --   --  29   ALT 36  --   --   --   --  36     CBC  Recent Labs   Lab 04/19/21  0607 04/15/21  0553   WBC 4.5 4.2   RBC 3.99* 3.91*   HGB 12.2* 11.5*   HCT 38.0* 36.6*   MCV 95 94   MCH 30.6 29.4   MCHC 32.1 31.4*   RDW 15.2* 15.5*    168     INR  Recent Labs   Lab 04/19/21  0607 04/18/21  0656 04/17/21  0705 04/16/21  0630   INR 2.84* 2.81* 2.60* 2.64*       Patient discussed with Dr. Seth.      Didi Butler PAJanetC  [facility]

## 2021-04-19 NOTE — PROGRESS NOTES
Neuro: A&Ox4.   Cardiac: SR 60's-70s 1st AVB/BBB. VSS. K+ 3.9, Mag 2.6. Wt down 0.4 kg today, LE and abdominal edema, Bumex 4 mg IV twice this shift. Cr 1.72. INR 2.84, due for 8 mg Coumadin tonight. LVAD #s & driveline site WDL.   Respiratory: Sating WDL on RA. No SOB, RESENDIZ or cough.  GI/:  ml this shift so far, last BM yesterday.  Diet/appetite: Tolerating 2 gr Na diet. Eating well, tired of hospital food, wife brought lunch in.  Activity:  independent, up to chair, in room.  Pain: Lidocaine 2% gel and warm packs to fingers for burning sensation (L) > (R), Plain Tylenol and Capsaicin ointment to (B) shoulders.  Skin: No new deficits noted.  LDA's: PIV, Tele, LVAD   Refer to flow sheets for full assessments, VS, FSG, labs etc.  Plan: Continue with POC. Notify primary team with changes.

## 2021-04-19 NOTE — PLAN OF CARE
Shift summary [id number]    D:  Pt awaiting heart transplantation. Pt has history of NICM EF 20% c/b VT s/p CRT-D s/p HMII LVAD 6/19/2017 originally intended as destination therapy 2/2 obesity however with recent weight loss now candidate for transplantation.  A/I:   Pt has been in SR with occasional pacing. Pt's VSS. Pt's heart mate numbers WNL. Dressing changed  on days. Pt up independently, appears more tired last few days. Pt's appetite fair. FSBG WNL. Pt voiding in good amounts. Pt's abdomen more rounded but pt reports improvement. Pt's LE with trace edema. Pt continues to c/o finger pain and shoulder pain improved with robaxin, tylenol, gabapentin and tramadol Lidocaine jelly for fingers also. Pt not up ambulating this shift except to chair and BR. Pt's spouse overheard stating long hospitalization and separation getting harder, pt's spirits still remain high.   P:  Continue current POC and monitoring. Pt's extension for 2 E status due on 04/23/21

## 2021-04-19 NOTE — PROGRESS NOTES
The patient's HeartMate LVAD was interrogated 4/19/2021  * Speed 9600 rpm   * Pulsatility index 3.8   * Power 6.1 Manuel   * Flow 6.2 L/minute   Fluid status: mild hypervolemia   Alarms were reviewed, and notable for many pi events with rare associate speed drops.   The driveline exit site was inspected, c/d/i.   All external components were inspected and showed no evidence of damage or malfunction, none replaced.   No changes to VAD settings made

## 2021-04-19 NOTE — PLAN OF CARE
Temp: 97.3  F (36.3  C) Temp src: Oral BP: 97/74 Pulse: 66   Resp: 16 SpO2: 97 % O2 Device: None (Room air)       D: Admitted with worsening HF, now status 2E awaiting transplant. Hx NICM s/p HM2 (2017), afib, RV failure, CKD, DM, CECILIA, HTN, asthma     I/A: Sharon (he/him) is A&O x4. Tele in place, SR. VSS on RA, CPAP HS. VAD numbers WDL. PIV in place, SL. Tylenol, tramadol, and Robaxin given for pain. Up independently. Slept well overnight     P: Continue to monitor and follow POC. Notify Cards 2 with changes

## 2021-04-20 LAB
ANION GAP SERPL CALCULATED.3IONS-SCNC: 9 MMOL/L (ref 3–14)
BUN SERPL-MCNC: 48 MG/DL (ref 7–30)
CALCIUM SERPL-MCNC: 8.5 MG/DL (ref 8.5–10.1)
CHLORIDE SERPL-SCNC: 102 MMOL/L (ref 94–109)
CO2 SERPL-SCNC: 26 MMOL/L (ref 20–32)
CREAT SERPL-MCNC: 1.92 MG/DL (ref 0.66–1.25)
GFR SERPL CREATININE-BSD FRML MDRD: 36 ML/MIN/{1.73_M2}
GLUCOSE BLDC GLUCOMTR-MCNC: 121 MG/DL (ref 70–99)
GLUCOSE BLDC GLUCOMTR-MCNC: 140 MG/DL (ref 70–99)
GLUCOSE BLDC GLUCOMTR-MCNC: 84 MG/DL (ref 70–99)
GLUCOSE BLDC GLUCOMTR-MCNC: 92 MG/DL (ref 70–99)
GLUCOSE SERPL-MCNC: 88 MG/DL (ref 70–99)
INR PPP: 2.67 (ref 0.86–1.14)
MAGNESIUM SERPL-MCNC: 2.6 MG/DL (ref 1.6–2.3)
POTASSIUM SERPL-SCNC: 3.9 MMOL/L (ref 3.4–5.3)
SODIUM SERPL-SCNC: 136 MMOL/L (ref 133–144)

## 2021-04-20 PROCEDURE — 250N000013 HC RX MED GY IP 250 OP 250 PS 637: Performed by: NURSE PRACTITIONER

## 2021-04-20 PROCEDURE — 250N000013 HC RX MED GY IP 250 OP 250 PS 637: Performed by: PHYSICIAN ASSISTANT

## 2021-04-20 PROCEDURE — 84132 ASSAY OF SERUM POTASSIUM: CPT | Performed by: STUDENT IN AN ORGANIZED HEALTH CARE EDUCATION/TRAINING PROGRAM

## 2021-04-20 PROCEDURE — 214N000001 HC R&B CCU UMMC

## 2021-04-20 PROCEDURE — 93750 INTERROGATION VAD IN PERSON: CPT | Performed by: PHYSICIAN ASSISTANT

## 2021-04-20 PROCEDURE — 99232 SBSQ HOSP IP/OBS MODERATE 35: CPT | Mod: 25 | Performed by: PHYSICIAN ASSISTANT

## 2021-04-20 PROCEDURE — 999N001017 HC STATISTIC GLUCOSE BY METER IP

## 2021-04-20 PROCEDURE — 85610 PROTHROMBIN TIME: CPT | Performed by: STUDENT IN AN ORGANIZED HEALTH CARE EDUCATION/TRAINING PROGRAM

## 2021-04-20 PROCEDURE — 250N000013 HC RX MED GY IP 250 OP 250 PS 637: Performed by: STUDENT IN AN ORGANIZED HEALTH CARE EDUCATION/TRAINING PROGRAM

## 2021-04-20 PROCEDURE — 80048 BASIC METABOLIC PNL TOTAL CA: CPT | Performed by: STUDENT IN AN ORGANIZED HEALTH CARE EDUCATION/TRAINING PROGRAM

## 2021-04-20 PROCEDURE — 250N000009 HC RX 250: Performed by: NURSE PRACTITIONER

## 2021-04-20 PROCEDURE — 36415 COLL VENOUS BLD VENIPUNCTURE: CPT | Performed by: STUDENT IN AN ORGANIZED HEALTH CARE EDUCATION/TRAINING PROGRAM

## 2021-04-20 PROCEDURE — 83735 ASSAY OF MAGNESIUM: CPT | Performed by: STUDENT IN AN ORGANIZED HEALTH CARE EDUCATION/TRAINING PROGRAM

## 2021-04-20 PROCEDURE — 250N000013 HC RX MED GY IP 250 OP 250 PS 637: Performed by: INTERNAL MEDICINE

## 2021-04-20 RX ORDER — BUMETANIDE 2 MG/1
4 TABLET ORAL
Status: DISCONTINUED | OUTPATIENT
Start: 2021-04-20 | End: 2021-04-21

## 2021-04-20 RX ADMIN — Medication 50 MG: at 04:00

## 2021-04-20 RX ADMIN — DOCUSATE SODIUM 50 MG AND SENNOSIDES 8.6 MG 1 TABLET: 8.6; 5 TABLET, FILM COATED ORAL at 20:17

## 2021-04-20 RX ADMIN — HYDRALAZINE HYDROCHLORIDE 75 MG: 50 TABLET ORAL at 08:55

## 2021-04-20 RX ADMIN — GABAPENTIN 300 MG: 300 CAPSULE ORAL at 08:56

## 2021-04-20 RX ADMIN — ALBUTEROL SULFATE 2 PUFF: 90 AEROSOL, METERED RESPIRATORY (INHALATION) at 09:17

## 2021-04-20 RX ADMIN — BUPROPION HYDROCHLORIDE 100 MG: 100 TABLET, EXTENDED RELEASE ORAL at 08:57

## 2021-04-20 RX ADMIN — ASPIRIN 81 MG CHEWABLE TABLET 81 MG: 81 TABLET CHEWABLE at 08:56

## 2021-04-20 RX ADMIN — Medication 10 MG: at 22:08

## 2021-04-20 RX ADMIN — Medication 25 MG: at 22:08

## 2021-04-20 RX ADMIN — CHLOROTHIAZIDE 250 MG: 250 SUSPENSION ORAL at 14:35

## 2021-04-20 RX ADMIN — BUPROPION HYDROCHLORIDE 100 MG: 100 TABLET, EXTENDED RELEASE ORAL at 20:17

## 2021-04-20 RX ADMIN — POTASSIUM CHLORIDE 40 MEQ: 750 TABLET, EXTENDED RELEASE ORAL at 08:57

## 2021-04-20 RX ADMIN — GABAPENTIN 600 MG: 300 CAPSULE ORAL at 14:35

## 2021-04-20 RX ADMIN — ISOSORBIDE DINITRATE 10 MG: 10 TABLET ORAL at 20:17

## 2021-04-20 RX ADMIN — POTASSIUM CHLORIDE 40 MEQ: 750 TABLET, EXTENDED RELEASE ORAL at 20:17

## 2021-04-20 RX ADMIN — ALLOPURINOL 300 MG: 300 TABLET ORAL at 08:57

## 2021-04-20 RX ADMIN — CAPSAICIN: 0.75 CREAM TOPICAL at 22:11

## 2021-04-20 RX ADMIN — ANAKINRA 100 MG: 100 INJECTION, SOLUTION SUBCUTANEOUS at 09:02

## 2021-04-20 RX ADMIN — Medication 50 MG: at 11:44

## 2021-04-20 RX ADMIN — FLUTICASONE FUROATE AND VILANTEROL TRIFENATATE 1 PUFF: 100; 25 POWDER RESPIRATORY (INHALATION) at 09:02

## 2021-04-20 RX ADMIN — HYDRALAZINE HYDROCHLORIDE 75 MG: 50 TABLET ORAL at 20:17

## 2021-04-20 RX ADMIN — GABAPENTIN 600 MG: 300 CAPSULE ORAL at 22:08

## 2021-04-20 RX ADMIN — MONTELUKAST 10 MG: 10 TABLET, FILM COATED ORAL at 22:08

## 2021-04-20 RX ADMIN — ACETAMINOPHEN 650 MG: 325 TABLET, FILM COATED ORAL at 04:00

## 2021-04-20 RX ADMIN — METHOCARBAMOL 500 MG: 500 TABLET, FILM COATED ORAL at 22:08

## 2021-04-20 RX ADMIN — WARFARIN SODIUM 9 MG: 4 TABLET ORAL at 18:12

## 2021-04-20 RX ADMIN — ISOSORBIDE DINITRATE 10 MG: 10 TABLET ORAL at 14:35

## 2021-04-20 RX ADMIN — ACETAMINOPHEN 650 MG: 325 TABLET, FILM COATED ORAL at 16:22

## 2021-04-20 RX ADMIN — HYDRALAZINE HYDROCHLORIDE 75 MG: 50 TABLET ORAL at 14:35

## 2021-04-20 RX ADMIN — AMIODARONE HYDROCHLORIDE 200 MG: 200 TABLET ORAL at 08:56

## 2021-04-20 RX ADMIN — ACARBOSE 25 MG: 25 TABLET ORAL at 06:37

## 2021-04-20 RX ADMIN — BUMETANIDE 4 MG: 2 TABLET ORAL at 08:55

## 2021-04-20 RX ADMIN — ISOSORBIDE DINITRATE 10 MG: 10 TABLET ORAL at 08:56

## 2021-04-20 RX ADMIN — CETIRIZINE HYDROCHLORIDE 10 MG: 10 TABLET, FILM COATED ORAL at 08:58

## 2021-04-20 RX ADMIN — BUMETANIDE 4 MG: 2 TABLET ORAL at 16:14

## 2021-04-20 RX ADMIN — METHOCARBAMOL 500 MG: 500 TABLET, FILM COATED ORAL at 04:00

## 2021-04-20 RX ADMIN — CAPSAICIN 1 APPLICATOR: 0.75 CREAM TOPICAL at 08:59

## 2021-04-20 RX ADMIN — METHOCARBAMOL 500 MG: 500 TABLET, FILM COATED ORAL at 11:44

## 2021-04-20 RX ADMIN — Medication 50 MG: at 22:08

## 2021-04-20 RX ADMIN — ACETAMINOPHEN 650 MG: 325 TABLET, FILM COATED ORAL at 11:44

## 2021-04-20 RX ADMIN — UMECLIDINIUM 1 PUFF: 62.5 AEROSOL, POWDER ORAL at 09:02

## 2021-04-20 ASSESSMENT — ACTIVITIES OF DAILY LIVING (ADL)
ADLS_ACUITY_SCORE: 12
ADLS_ACUITY_SCORE: 12
ADLS_ACUITY_SCORE: 11
ADLS_ACUITY_SCORE: 12

## 2021-04-20 ASSESSMENT — MIFFLIN-ST. JEOR: SCORE: 1799.17

## 2021-04-20 NOTE — PROGRESS NOTES
Neuro: A&Ox4.   Cardiac: SR w/ 1st AVB, BBB, occasional PVC. VSS. LVAD #s WDL. K+ 3.9, Mag 2.6, CR 1.92, INR 2.67. IV Bumex stopped, started Bumex 4 mg po twice a day, plus received Diuril 250 mg po as one time Respiratory: Sating WDL on RA. No SOB, RESENDIZ or cough. Albuterol Inhaler used once.  GI/: Adequate urine output. Last BM yesterday.  Diet/appetite: Tolerating 2 Gr Na diet. Eating well.  Activity:  independent, up to chair, in room and halls.  Pain: At acceptable level on current regimen. Capsaicin ointment to shoulders, and Tramadol, Tylenol, Robaxin po. Lidocaine gel to fingers.  Skin: No new deficits noted.  LDA's: PIV, Tele,HM2  Refer  To flow sheets for full assessments, VS, FSG, labs etc.  Plan: Continue with POC. Notify primary team with changes.

## 2021-04-20 NOTE — PLAN OF CARE
"Presents for worsening dyspnea on exertion, lethargy. Decompensated heart failure. Currently here for heart txp waitlist status 2E. Pmhx:  NICM EF 20% c/b VT s/p CRT-D s/p HMII LVAD 2017 originally intended as destination therapy 2/2 obesity, recent weight loss, afib s/p DCCV on amiodarone and warfarin, staph hominis bacteremia, CKD3, COPD    Code status: Full     Team: cards 2  PRN: Tramadol x 2             Robaxin x 2            Tylenol             Melatonin            Albuterol             External lidocaine gel     Neuro: ao*4  Cardiac/Tele:  SR w/ 1*AVB w/ BBB, LVAD #'s wnl  Respiratory: room air  GI/: urinating via urinal/toliet  Diet/Appetite: 3g na   Skin: left knee abrasion open to air, LVAD dressing CDI and changed  Endocrine: ACHS  LDAs: PIV SL  Activity: up ad francisco  Pain: Shoulder tenderness     Around 1840 pt states \"My sugars feel low\" and reports of stomachaches and head ache. BG of 43 @1845, gave pt 240 ml of orange juice with packet of sugar.  Cards 2 notified per orders   1853 - BG 51 same intervention initiated  1902 - , pt stomach ache and headache resolved    Nuris Darling, RN  Time cared for 1500 - 2330  "

## 2021-04-20 NOTE — PLAN OF CARE
D: admitted 2/08 for worsening functional status and renal function concerning for worsening heart failure. Now listed status 2E for transplant.  Hx: NICM EF 20% c/b VT s/p CRT-D s/p HMII LVAD 6/19/2017 (originally DT 2/2 obesity, but now  candidate for transplantation with recent weight loss).      I: Monitored vitals and assessed pt status.   PRN: tylenol x1, robaxin x1, Tramadol x1     A: A0x4. VSS on RA/CPAP at HS. Tele shows SR with 1st degree AVB, BBB, and occasional PVCs, rate 60-80. LVAD #s WNL, no alarms overnight, dressing CDI. Afebrile. Reported pain in bilateral shoulders and burning sensation in fingers, relieved by prns. Urinating adequately. Up independently. 2300 BG check was 88, pt given ice cream and PBJ. Do not disturb orders respected from [id number].      P: Continue to monitor Pt status and report changes to Cards 2. [id number]  Rajani Holman RN on 4/20/2021 at 6:05 AM

## 2021-04-20 NOTE — PLAN OF CARE
OT/CR 6C: Pt declined at set time 2/2 recently getting diuretic. Will attempt back later today as schedule allows or reschedule.

## 2021-04-21 ENCOUNTER — TELEPHONE (OUTPATIENT)
Dept: TRANSPLANT | Facility: CLINIC | Age: 66
End: 2021-04-21

## 2021-04-21 VITALS — BODY MASS INDEX: 34.52 KG/M2 | WEIGHT: 227 LBS

## 2021-04-21 LAB
ANION GAP SERPL CALCULATED.3IONS-SCNC: 8 MMOL/L (ref 3–14)
BUN SERPL-MCNC: 49 MG/DL (ref 7–30)
CALCIUM SERPL-MCNC: 8.8 MG/DL (ref 8.5–10.1)
CHLORIDE SERPL-SCNC: 102 MMOL/L (ref 94–109)
CO2 SERPL-SCNC: 25 MMOL/L (ref 20–32)
CREAT SERPL-MCNC: 1.72 MG/DL (ref 0.66–1.25)
GFR SERPL CREATININE-BSD FRML MDRD: 41 ML/MIN/{1.73_M2}
GLUCOSE BLDC GLUCOMTR-MCNC: 103 MG/DL (ref 70–99)
GLUCOSE BLDC GLUCOMTR-MCNC: 119 MG/DL (ref 70–99)
GLUCOSE BLDC GLUCOMTR-MCNC: 129 MG/DL (ref 70–99)
GLUCOSE BLDC GLUCOMTR-MCNC: 158 MG/DL (ref 70–99)
GLUCOSE SERPL-MCNC: 101 MG/DL (ref 70–99)
INR PPP: 2.81 (ref 0.86–1.14)
MAGNESIUM SERPL-MCNC: 2.6 MG/DL (ref 1.6–2.3)
POTASSIUM SERPL-SCNC: 3.6 MMOL/L (ref 3.4–5.3)
SODIUM SERPL-SCNC: 135 MMOL/L (ref 133–144)

## 2021-04-21 PROCEDURE — 93750 INTERROGATION VAD IN PERSON: CPT | Performed by: PHYSICIAN ASSISTANT

## 2021-04-21 PROCEDURE — 250N000013 HC RX MED GY IP 250 OP 250 PS 637: Performed by: NURSE PRACTITIONER

## 2021-04-21 PROCEDURE — 36415 COLL VENOUS BLD VENIPUNCTURE: CPT | Performed by: STUDENT IN AN ORGANIZED HEALTH CARE EDUCATION/TRAINING PROGRAM

## 2021-04-21 PROCEDURE — 999N001017 HC STATISTIC GLUCOSE BY METER IP

## 2021-04-21 PROCEDURE — 250N000009 HC RX 250: Performed by: NURSE PRACTITIONER

## 2021-04-21 PROCEDURE — 250N000011 HC RX IP 250 OP 636: Performed by: PHYSICIAN ASSISTANT

## 2021-04-21 PROCEDURE — 250N000013 HC RX MED GY IP 250 OP 250 PS 637: Performed by: STUDENT IN AN ORGANIZED HEALTH CARE EDUCATION/TRAINING PROGRAM

## 2021-04-21 PROCEDURE — 250N000013 HC RX MED GY IP 250 OP 250 PS 637: Performed by: INTERNAL MEDICINE

## 2021-04-21 PROCEDURE — 214N000001 HC R&B CCU UMMC

## 2021-04-21 PROCEDURE — 99232 SBSQ HOSP IP/OBS MODERATE 35: CPT | Mod: 25 | Performed by: PHYSICIAN ASSISTANT

## 2021-04-21 PROCEDURE — 85610 PROTHROMBIN TIME: CPT | Performed by: STUDENT IN AN ORGANIZED HEALTH CARE EDUCATION/TRAINING PROGRAM

## 2021-04-21 PROCEDURE — 83735 ASSAY OF MAGNESIUM: CPT | Performed by: STUDENT IN AN ORGANIZED HEALTH CARE EDUCATION/TRAINING PROGRAM

## 2021-04-21 PROCEDURE — 250N000013 HC RX MED GY IP 250 OP 250 PS 637: Performed by: PHYSICIAN ASSISTANT

## 2021-04-21 PROCEDURE — 84132 ASSAY OF SERUM POTASSIUM: CPT | Performed by: STUDENT IN AN ORGANIZED HEALTH CARE EDUCATION/TRAINING PROGRAM

## 2021-04-21 PROCEDURE — 80048 BASIC METABOLIC PNL TOTAL CA: CPT | Performed by: STUDENT IN AN ORGANIZED HEALTH CARE EDUCATION/TRAINING PROGRAM

## 2021-04-21 RX ORDER — BUMETANIDE 0.25 MG/ML
4 INJECTION INTRAMUSCULAR; INTRAVENOUS 2 TIMES DAILY
Status: DISCONTINUED | OUTPATIENT
Start: 2021-04-21 | End: 2021-04-24

## 2021-04-21 RX ORDER — POTASSIUM CHLORIDE 750 MG/1
20 TABLET, EXTENDED RELEASE ORAL ONCE
Status: COMPLETED | OUTPATIENT
Start: 2021-04-21 | End: 2021-04-21

## 2021-04-21 RX ADMIN — UMECLIDINIUM 1 PUFF: 62.5 AEROSOL, POWDER ORAL at 09:06

## 2021-04-21 RX ADMIN — POTASSIUM CHLORIDE 20 MEQ: 750 TABLET, EXTENDED RELEASE ORAL at 09:07

## 2021-04-21 RX ADMIN — ACETAMINOPHEN 650 MG: 325 TABLET, FILM COATED ORAL at 13:24

## 2021-04-21 RX ADMIN — POTASSIUM CHLORIDE 40 MEQ: 750 TABLET, EXTENDED RELEASE ORAL at 09:07

## 2021-04-21 RX ADMIN — DOCUSATE SODIUM 50 MG AND SENNOSIDES 8.6 MG 1 TABLET: 8.6; 5 TABLET, FILM COATED ORAL at 19:43

## 2021-04-21 RX ADMIN — ACETAMINOPHEN 650 MG: 325 TABLET, FILM COATED ORAL at 21:55

## 2021-04-21 RX ADMIN — WARFARIN SODIUM 9 MG: 4 TABLET ORAL at 17:20

## 2021-04-21 RX ADMIN — HYDRALAZINE HYDROCHLORIDE 75 MG: 50 TABLET ORAL at 19:44

## 2021-04-21 RX ADMIN — Medication 50 MG: at 21:55

## 2021-04-21 RX ADMIN — HYDRALAZINE HYDROCHLORIDE 75 MG: 50 TABLET ORAL at 14:12

## 2021-04-21 RX ADMIN — BUMETANIDE 4 MG: 0.25 INJECTION INTRAMUSCULAR; INTRAVENOUS at 14:10

## 2021-04-21 RX ADMIN — ASPIRIN 81 MG CHEWABLE TABLET 81 MG: 81 TABLET CHEWABLE at 09:03

## 2021-04-21 RX ADMIN — BUPROPION HYDROCHLORIDE 100 MG: 100 TABLET, EXTENDED RELEASE ORAL at 09:03

## 2021-04-21 RX ADMIN — ISOSORBIDE DINITRATE 10 MG: 10 TABLET ORAL at 19:44

## 2021-04-21 RX ADMIN — HYDRALAZINE HYDROCHLORIDE 75 MG: 50 TABLET ORAL at 09:04

## 2021-04-21 RX ADMIN — ACARBOSE 25 MG: 25 TABLET ORAL at 06:21

## 2021-04-21 RX ADMIN — METHOCARBAMOL 500 MG: 500 TABLET, FILM COATED ORAL at 06:32

## 2021-04-21 RX ADMIN — ISOSORBIDE DINITRATE 10 MG: 10 TABLET ORAL at 09:04

## 2021-04-21 RX ADMIN — CETIRIZINE HYDROCHLORIDE 10 MG: 10 TABLET, FILM COATED ORAL at 09:04

## 2021-04-21 RX ADMIN — GABAPENTIN 600 MG: 300 CAPSULE ORAL at 14:10

## 2021-04-21 RX ADMIN — METHOCARBAMOL 500 MG: 500 TABLET, FILM COATED ORAL at 16:12

## 2021-04-21 RX ADMIN — Medication 50 MG: at 16:12

## 2021-04-21 RX ADMIN — BUMETANIDE 4 MG: 0.25 INJECTION INTRAMUSCULAR; INTRAVENOUS at 09:05

## 2021-04-21 RX ADMIN — Medication 50 MG: at 06:32

## 2021-04-21 RX ADMIN — ANAKINRA 100 MG: 100 INJECTION, SOLUTION SUBCUTANEOUS at 09:08

## 2021-04-21 RX ADMIN — AMIODARONE HYDROCHLORIDE 200 MG: 200 TABLET ORAL at 09:03

## 2021-04-21 RX ADMIN — CHLOROTHIAZIDE 250 MG: 250 SUSPENSION ORAL at 13:24

## 2021-04-21 RX ADMIN — GABAPENTIN 600 MG: 300 CAPSULE ORAL at 21:55

## 2021-04-21 RX ADMIN — Medication 25 MG: at 21:58

## 2021-04-21 RX ADMIN — METHOCARBAMOL 500 MG: 500 TABLET, FILM COATED ORAL at 21:54

## 2021-04-21 RX ADMIN — GABAPENTIN 300 MG: 300 CAPSULE ORAL at 09:03

## 2021-04-21 RX ADMIN — POTASSIUM CHLORIDE 40 MEQ: 750 TABLET, EXTENDED RELEASE ORAL at 19:43

## 2021-04-21 RX ADMIN — BUPROPION HYDROCHLORIDE 100 MG: 100 TABLET, EXTENDED RELEASE ORAL at 19:44

## 2021-04-21 RX ADMIN — MONTELUKAST 10 MG: 10 TABLET, FILM COATED ORAL at 21:54

## 2021-04-21 RX ADMIN — ISOSORBIDE DINITRATE 10 MG: 10 TABLET ORAL at 14:12

## 2021-04-21 RX ADMIN — Medication 10 MG: at 21:55

## 2021-04-21 RX ADMIN — FLUTICASONE FUROATE AND VILANTEROL TRIFENATATE 1 PUFF: 100; 25 POWDER RESPIRATORY (INHALATION) at 09:06

## 2021-04-21 RX ADMIN — ALLOPURINOL 300 MG: 300 TABLET ORAL at 09:04

## 2021-04-21 ASSESSMENT — ACTIVITIES OF DAILY LIVING (ADL)
ADLS_ACUITY_SCORE: 12

## 2021-04-21 ASSESSMENT — MIFFLIN-ST. JEOR: SCORE: 1799.17

## 2021-04-21 NOTE — PROGRESS NOTES
The patient's HeartMate LVAD was interrogated 4/21/2021  * Speed 9600 rpm   * Pulsatility index 3.1   * Power 6.4 Manuel   * Flow 5.8 L/minute   Fluid status: mild hypervolemia  Alarms were reviewed, and notable for frequent PI events with rare associated speed drops. No low voltage alerts.   The driveline exit site was inspected, c/d/i.   All external components were inspected and showed no evidence of damage or malfunction, none replaced.   No changes to VAD settings made

## 2021-04-21 NOTE — PROGRESS NOTES
[facility]   Cardiology II Service / Advanced Heart Failure  Daily Progress Note      Patient: Jim Willingham  MRN: [id number]  Admission Date: 2/8/2021  Hospital Day # 72    Assessment and Plan: Jim Willingham is a 65 year old male with history of NICM EF 20% c/b VT s/p CRT-D s/p HMII LVAD 6/19/2017 originally intended as destination therapy 2/2 obesity however with recent weight loss now candidate for transplantation. He is admitted for worsening functional status and renal function concerning for worsening heart failure. He is now listed status 2E for transplant.    Today's changes:  - Bumex 4 mg IV BID  - Repeat PO Diuril 250 x1 again today     Chronic systolic heart failure secondary to NICM s/p HM II LVAD. Echo 1/8/21 at 9400 rpm, EF<30%m LVIDd 6.8cm, at least mildly reduced RV function, AoV closed without AI, mild-mod eccentric MR, dilated IVC without collapse. RHC 3/23 RA 10 mPA 25 mPCWP 14 Kee CO/CI 6.5/3.1.  Stage D, NYHA Class III  ACEi/ARB contraindicated due to renal dysfunction. Isordil 10 mg po TID. Hydralazine 75 mg po TID.   BB Contraindicated due to low cardiac output.   Aldosterone antagonist contraindicated due to renal dysfunction  SCD prophylaxis CRT-D  Fluid status: Euvolemic Bumex 4 mg po BID.   MAP: Goal 65-85, overall within goal   LDH: 316 4/16  Anticoagulation: Coumadin per pharmacy. INR- 2.81, goal 2.5-3, goal increased in setting of power spikes and elevated flows this hospitalization. Will give higher dose of Coumadin today.   Antiplatelet: ASA 81 mg po daily  - remains on the cardiac transplant list status 2E. Persistent escalation of diuretics in setting of progressive heart failure with refractory hypervolemia.   - Status 2 Extension due 04/23/2021    WALTER on CKD Stage III. Cr peaked at 2.22.  - Cr- 1.72 (1.92).     History of Afib. History of VT/VF. Asymptomatic bursts of questionable AF vs NSVT without hemodynamic compromise. RHC 3/23 RA 10 mPA 25 mPCWP 14 Kee  CO/CI 6.5/3.1. CHADSVASC-5.   - Continue Amiodarone 200 mg po daily.   - Coumadin as above.      DM type II, controlled. Symptomatic Hypoglycemia, resolved. Low Cortisol: Hgb A1C-6.2. Lantus and Novololg sliding scale insulin discontinued. Endo consult appreciated. C-peptide 13.9 an Proinsulin 18.4. Serum cortisol 7.7 3/6/21, cosyntropin stim test WNL per Endo.   -Per endo --> Fingerstick glucose might not be accurate for hypoglycemia, if patient has glucose <55 (without insulin), plasma glucose should be sent for confirmation of hypoglycemia prior to correction. While the patient is off insulin therapy, POC glucose readings above 60 are acceptable for the patient  - Appreciate Endocrine consult  - Continue Acarbose 25 mg po daily.  - If recurrent episode pt amenable to increasing Acarbose to 50 mg po daily.      Bilateral Shoulder Pain, Right > Left. Prior history of osteoarthritis to right shoulder per Xray in 2018. He notes ortho evaluation in 2015 with CT consistent with right rotator cuff tear (unable to obtain MRI due to LVAD). Has had steroid injections in the past with some improvement. 3 view X-ray of right shoulder with mild osteoarthritis and bone spur.  - Tramadol prn  - Reviewed with Ortho with recommendations for conservative approach. Follow up with Ortho following transplant for further evaluation.   - Continue Capcaicin cream prn.      Chronic/resolved conditions  COPD/Asthma: pta Breo Ellipta, albuterol prn.   Depression: pta Bupropion  Right sided weakness, resolved. CT head negative for acute findings. Appreciate Neuro eval.   Staph Hominis Bacteremia. No need for surveillance blood cx per transplant ID.    Rhinovirus, resolved. Bacterial bronchitis. Completed 5 day course of Cefdinir. COVID negative 2/8. Resp PCR +rhinovirus. CXR 2/12 with no overt PNA. Stopped cefdinir 2/15. Cleared for transplant per ID. Repeat RVP negative 3/5. COVID repeated due to sinus congestion, negative. Congestion  "improved with transition from Claritin to Zyrtec.   Carpel tunnel, bilateral s/p bilateral release. Evidence of thenar atrophy. Relief with steroid injection 11/20. Carpal tunnel release 2/18/21. Appreciate Ortho/Plastics consult, signed off. Continue Tylenol 650 mg po QID and Gabapentin 300 mg in AM, 600 mg in the afternoon, and 600 mg in the evening. Notified ortho of finger paresthesia, which is common after surgery per ortho. Lidocaine gel prn.      FEN: 3 gram sodium diet   PROPHY:  Coumadin  LINES:  PIV   DISPO:  TBD pending cardiac transplant.   CODE STATUS: Full Code   ================================================================    Interval History/ROS: He denies fever, chills, chest pain, palpitations, cough, nausea, vomiting, diarrhea, hematochezia, and melena. His LE edema and abdominal bloating are still present, about the same as yesterday. Feels mildly SOB but was still able to walk well in the hallway yesterday. No cough. No PND. He is tolerating oral intake and ambulation.     Last 24 hr care team notes reviewed.   ROS:  4 point ROS including Respiratory, CV, GI and , other than that noted in the HPI, is negative.     Medications: Reviewed in EPIC.     Physical Exam:   BP 90/81 (BP Location: Left arm)   Pulse 68   Temp 98.3  F (36.8  C) (Oral)   Resp 16   Ht 1.727 m (5' 8\")   Wt 103 kg (227 lb)   SpO2 98%   BMI 34.52 kg/m    GENERAL: Appears alert and interacting appropriatly.  HEENT: Eye symmetrical and free of discharge bilaterally. Mucous membranes moist and without lesions.  NECK: Supple and without lymphadenopathy. JVD mid neck when upright  CV: RRR, S1S2 present with LVAD hum.   RESPIRATORY: Respirations regular, even, and unlabored. Lungs CTA throughout.   GI: Soft and mildly distended with normoactive bowel sounds present in all quadrants. No tenderness, rebound, guarding. No organomegaly.   EXTREMITIES: Trace bilateral LE peripheral edema. 2+ bilateral pedal pulses. "   NEUROLOGIC: Alert and interacting appropriatly. No focal deficits.   MUSCULOSKELETAL: No joint swelling or tenderness.   SKIN: No jaundice. No rashes or lesions. LVAD drive line covered.     Data:  CMP  Recent Labs   Lab 04/21/21 0626 04/20/21 0631 04/19/21  0607 04/18/21  0656 04/15/21  0553 04/15/21  0553    136 134 135   < > 133   POTASSIUM 3.6 3.9 3.9 3.7   < > 3.8   CHLORIDE 102 102 102 100   < > 100   CO2 25 26 25 26   < > 28   ANIONGAP 8 9 7 8   < > 6   * 88 90 87   < > 85   BUN 49* 48* 46* 44*   < > 54*   CR 1.72* 1.92* 1.72* 1.68*   < > 1.85*   GFRESTIMATED 41* 36* 41* 42*   < > 38*   GFRESTBLACK 48* 42* 48* 49*   < > 44*   QAMAR 8.8 8.5 8.8 8.6   < > 8.8   MAG 2.6* 2.6* 2.6* 2.5*   < > 2.7*   PROTTOTAL  --   --  6.7*  --   --  6.6*   ALBUMIN  --   --  3.6  --   --  3.6   BILITOTAL  --   --  0.8  --   --  0.6   ALKPHOS  --   --  102  --   --  100   AST  --   --  34  --   --  29   ALT  --   --  36  --   --  36    < > = values in this interval not displayed.     CBC  Recent Labs   Lab 04/19/21  0607 04/15/21  0553   WBC 4.5 4.2   RBC 3.99* 3.91*   HGB 12.2* 11.5*   HCT 38.0* 36.6*   MCV 95 94   MCH 30.6 29.4   MCHC 32.1 31.4*   RDW 15.2* 15.5*    168     INR  Recent Labs   Lab 04/21/21 0626 04/20/21 0631 04/19/21  0607 04/18/21  0656   INR 2.81* 2.67* 2.84* 2.81*       Patient discussed with Dr. Seth.      Didi Butler PA-C  [facility]

## 2021-04-21 NOTE — PROGRESS NOTES
[facility]   Cardiology II Service / Advanced Heart Failure  Daily Progress Note      Patient: Jim Willingham  MRN: [id number]  Admission Date: 2/8/2021  Hospital Day # 72    Assessment and Plan: Jim Willingham is a 65 year old male with history of NICM EF 20% c/b VT s/p CRT-D s/p HMII LVAD 6/19/2017 originally intended as destination therapy 2/2 obesity however with recent weight loss now candidate for transplantation. He is admitted for worsening functional status and renal function concerning for worsening heart failure. He is now listed status 2E for transplant.    Today's changes:  - Continue bumex 4 mg BID  - Diuril 250 x1     Chronic systolic heart failure secondary to NICM s/p HM II LVAD. Echo 1/8/21 at 9400 rpm, EF<30%m LVIDd 6.8cm, at least mildly reduced RV function, AoV closed without AI, mild-mod eccentric MR, dilated IVC without collapse. RHC 3/23 RA 10 mPA 25 mPCWP 14 Kee CO/CI 6.5/3.1.  Stage D, NYHA Class III  ACEi/ARB contraindicated due to renal dysfunction. Isordil 10 mg po TID. Hydralazine 75 mg po TID.   BB Contraindicated due to low cardiac output.   Aldosterone antagonist contraindicated due to renal dysfunction  SCD prophylaxis CRT-D  Fluid status: Euvolemic Bumex 4 mg po BID.   MAP: Goal 65-85, overall within goal   LDH: 316 4/16  Anticoagulation: Coumadin per pharmacy. INR- 2.67, goal 2.5-3, goal increased in setting of power spikes and elevated flows this hospitalization. Will give higher dose of Coumadin today.   Antiplatelet: ASA 81 mg po daily  - remains on the cardiac transplant list status 2E. Persistent escalation of diuretics in setting of progressive heart failure with refractory hypervolemia.   - Status 2 Extension due 04/23/2021    WALTER on CKD Stage III. Cr peaked at 2.22.  - Cr- 1.92 (1.72).     History of Afib. History of VT/VF. Asymptomatic bursts of questionable AF vs NSVT without hemodynamic compromise. RHC 3/23 RA 10 mPA 25 mPCWP 14 Kee CO/CI 6.5/3.1.  CHADSVASC-5.   - Continue Amiodarone 200 mg po daily.   - Coumadin as above.      DM type II, controlled. Symptomatic Hypoglycemia, resolved. Low Cortisol: Hgb A1C-6.2. Lantus and Novololg sliding scale insulin discontinued. Endo consult appreciated. C-peptide 13.9 an Proinsulin 18.4. Serum cortisol 7.7 3/6/21, cosyntropin stim test WNL per Endo.   -Per endo --> Fingerstick glucose might not be accurate for hypoglycemia, if patient has glucose <55 (without insulin), plasma glucose should be sent for confirmation of hypoglycemia prior to correction. While the patient is off insulin therapy, POC glucose readings above 60 are acceptable for the patient  - Appreciate Endocrine consult  - Continue Acarbose 25 mg po daily.  - If recurrent episode pt amenable to increasing Acarbose to 50 mg po daily.      Bilateral Shoulder Pain, Right > Left. Prior history of osteoarthritis to right shoulder per Xray in 2018. He notes ortho evaluation in 2015 with CT consistent with right rotator cuff tear (unable to obtain MRI due to LVAD). Has had steroid injections in the past with some improvement. 3 view X-ray of right shoulder with mild osteoarthritis and bone spur.  - Tramadol prn  - Reviewed with Ortho with recommendations for conservative approach. Follow up with Ortho following transplant for further evaluation.   - Continue Capcaicin cream prn.      Chronic/resolved conditions  COPD/Asthma: pta Breo Ellipta, albuterol prn.   Depression: pta Bupropion  Right sided weakness, resolved. CT head negative for acute findings. Appreciate Neuro eval.   Staph Hominis Bacteremia. No need for surveillance blood cx per transplant ID.    Rhinovirus, resolved. Bacterial bronchitis. Completed 5 day course of Cefdinir. COVID negative 2/8. Resp PCR +rhinovirus. CXR 2/12 with no overt PNA. Stopped cefdinir 2/15. Cleared for transplant per ID. Repeat RVP negative 3/5. COVID repeated due to sinus congestion, negative. Congestion improved with  "transition from Claritin to [facility].   Carpel tunnel, bilateral s/p bilateral release. Evidence of thenar atrophy. Relief with steroid injection 11/20. Carpal tunnel release 2/18/21. Appreciate Ortho/Plastics consult, signed off. Continue Tylenol 650 mg po QID and Gabapentin 300 mg in AM, 600 mg in the afternoon, and 600 mg in the evening. Notified ortho of finger paresthesia, which is common after surgery per ortho. Lidocaine gel prn.      FEN: 3 gram sodium diet   PROPHY:  Coumadin  LINES:  PIV   DISPO:  TBD pending cardiac transplant.   CODE STATUS: Full Code   ================================================================    Interval History/ROS: He denies fever, chills, chest pain, palpitations, cough, nausea, vomiting, diarrhea, hematochezia, and melena. His LE edema and abdominal bloating are still present, worse than yesterday. Feels mildly SOB. No cough. No PND. He is tolerating oral intake and ambulation.     Last 24 hr care team notes reviewed.   ROS:  4 point ROS including Respiratory, CV, GI and , other than that noted in the HPI, is negative.     Medications: Reviewed in EPIC.     Physical Exam:   BP 90/81 (BP Location: Left arm)   Pulse 68   Temp 98.3  F (36.8  C) (Oral)   Resp 16   Ht 1.727 m (5' 8\")   Wt 103 kg (227 lb)   SpO2 98%   BMI 34.52 kg/m    GENERAL: Appears alert and interacting appropriatly.  HEENT: Eye symmetrical and free of discharge bilaterally. Mucous membranes moist and without lesions.  NECK: Supple and without lymphadenopathy. JVD mid neck when upright  CV: RRR, S1S2 present with LVAD hum.   RESPIRATORY: Respirations regular, even, and unlabored. Lungs CTA throughout.   GI: Soft and non distended with normoactive bowel sounds present in all quadrants. No tenderness, rebound, guarding. No organomegaly.   EXTREMITIES: Trace bilateral LE peripheral edema. 2+ bilateral pedal pulses.   NEUROLOGIC: Alert and interacting appropriatly. No focal deficits.   MUSCULOSKELETAL: No " joint swelling or tenderness.   SKIN: No jaundice. No rashes or lesions. LVAD drive line covered.     Data:  CMP  Recent Labs   Lab 04/21/21  0626 04/20/21  0631 04/19/21  0607 04/18/21  0656 04/15/21  0553 04/15/21  0553    136 134 135   < > 133   POTASSIUM 3.6 3.9 3.9 3.7   < > 3.8   CHLORIDE 102 102 102 100   < > 100   CO2 25 26 25 26   < > 28   ANIONGAP 8 9 7 8   < > 6   * 88 90 87   < > 85   BUN 49* 48* 46* 44*   < > 54*   CR 1.72* 1.92* 1.72* 1.68*   < > 1.85*   GFRESTIMATED 41* 36* 41* 42*   < > 38*   GFRESTBLACK 48* 42* 48* 49*   < > 44*   QAMAR 8.8 8.5 8.8 8.6   < > 8.8   MAG 2.6* 2.6* 2.6* 2.5*   < > 2.7*   PROTTOTAL  --   --  6.7*  --   --  6.6*   ALBUMIN  --   --  3.6  --   --  3.6   BILITOTAL  --   --  0.8  --   --  0.6   ALKPHOS  --   --  102  --   --  100   AST  --   --  34  --   --  29   ALT  --   --  36  --   --  36    < > = values in this interval not displayed.     CBC  Recent Labs   Lab 04/19/21  0607 04/15/21  0553   WBC 4.5 4.2   RBC 3.99* 3.91*   HGB 12.2* 11.5*   HCT 38.0* 36.6*   MCV 95 94   MCH 30.6 29.4   MCHC 32.1 31.4*   RDW 15.2* 15.5*    168     INR  Recent Labs   Lab 04/21/21  0626 04/20/21  0631 04/19/21  0607 04/18/21  0656   INR 2.81* 2.67* 2.84* 2.81*       Patient discussed with Dr. Seth.      Didi Butler PA-C  [facility]

## 2021-04-21 NOTE — PLAN OF CARE
D: Admitted 2/8 for worsening functional status and renal function concerning for worsening HF and on status 2E for transplant. Hx of NICM EF 20% c/b VT s/p CRT-D s/p HeartMate 2 LVAD (2017), WALTER on CKD3, Afib, gout, and type 2 diabetes.     I: Monitored vitals and assessed pt status.   Changed:  Saline locked PIV   PRN: robaxin; melatonin; tramadol; capsicin cream     A: A0x4, able to express needs. SR w/ PVCs. MAPs above 60. Room air, CPAP @ noc. LVAD #'s WDL, no alarms noted. Afebrile. Urinating adequately, see flowsheets. Pain in bilateral shoulders, prn medications for relief. Up independently in room. Last BM 4/20.     P: Continue to monitor Pt status and report changes to Cards 2. Awaiting heart transplant.

## 2021-04-21 NOTE — PROGRESS NOTES
Neuro: A&Ox4.   Cardiac: SR with 1st AVB, BBB, PVCs.  VS WDL. LVAD #s WDL. K+ 3.6 replaced per Protocol, CR 1.72, diuretics changed back to Bumex 4 mg IV twice a day, received Diuril dose po 1/2 hour before afternoon Bumex. INR 2.81, to receive Coumadin 9 mg tonight.   Respiratory: Sating WDL on RA.  GI/: Adequate urine output. Last BM yesterday.  Diet/appetite: Tolerating 3 gr Na diet. Eating well.  Activity: independent, up to chair,BR  and in halls.  Pain: Plain Tylenol once for (B) shoulder discomfort.  Skin: No new deficits noted.  LDA's: PIV, Tele, HM2  Refer to flow sheets forfull assessments, VS, FSG, labs etc.   & 119.  Plan: Continue with POC. Notify primary team with changes.

## 2021-04-21 NOTE — PROGRESS NOTES
The patient's HeartMate LVAD was interrogated 4/20/2021  * Speed 9600 rpm   * Pulsatility index 3.7   * Power 6.8 Manuel   * Flow 6.6 L/minute   Fluid status: mild hypervolemia   Alarms were reviewed, and notable for frequent pi events, rare associated speed drops.   The driveline exit site was inspected, c/d/i.   All external components were inspected and showed no evidence of damage or malfunction, none replaced.   No changes to VAD settings made

## 2021-04-22 ENCOUNTER — APPOINTMENT (OUTPATIENT)
Dept: OCCUPATIONAL THERAPY | Facility: CLINIC | Age: 66
DRG: 001 | End: 2021-04-22
Attending: INTERNAL MEDICINE
Payer: COMMERCIAL

## 2021-04-22 LAB
ANION GAP SERPL CALCULATED.3IONS-SCNC: 7 MMOL/L (ref 3–14)
BUN SERPL-MCNC: 51 MG/DL (ref 7–30)
CALCIUM SERPL-MCNC: 8.8 MG/DL (ref 8.5–10.1)
CHLORIDE SERPL-SCNC: 102 MMOL/L (ref 94–109)
CO2 SERPL-SCNC: 27 MMOL/L (ref 20–32)
CREAT SERPL-MCNC: 1.81 MG/DL (ref 0.66–1.25)
ERYTHROCYTE [DISTWIDTH] IN BLOOD BY AUTOMATED COUNT: 15.4 % (ref 10–15)
GFR SERPL CREATININE-BSD FRML MDRD: 39 ML/MIN/{1.73_M2}
GLUCOSE BLDC GLUCOMTR-MCNC: 114 MG/DL (ref 70–99)
GLUCOSE BLDC GLUCOMTR-MCNC: 119 MG/DL (ref 70–99)
GLUCOSE BLDC GLUCOMTR-MCNC: 88 MG/DL (ref 70–99)
GLUCOSE SERPL-MCNC: 92 MG/DL (ref 70–99)
HCT VFR BLD AUTO: 37.2 % (ref 40–53)
HGB BLD-MCNC: 11.9 G/DL (ref 13.3–17.7)
INR PPP: 2.9 (ref 0.86–1.14)
MAGNESIUM SERPL-MCNC: 2.5 MG/DL (ref 1.6–2.3)
MCH RBC QN AUTO: 30.5 PG (ref 26.5–33)
MCHC RBC AUTO-ENTMCNC: 32 G/DL (ref 31.5–36.5)
MCV RBC AUTO: 95 FL (ref 78–100)
PLATELET # BLD AUTO: 167 10E9/L (ref 150–450)
POTASSIUM SERPL-SCNC: 3.4 MMOL/L (ref 3.4–5.3)
POTASSIUM SERPL-SCNC: 3.9 MMOL/L (ref 3.4–5.3)
RBC # BLD AUTO: 3.9 10E12/L (ref 4.4–5.9)
SODIUM SERPL-SCNC: 136 MMOL/L (ref 133–144)
WBC # BLD AUTO: 4.2 10E9/L (ref 4–11)

## 2021-04-22 PROCEDURE — 214N000001 HC R&B CCU UMMC

## 2021-04-22 PROCEDURE — 36415 COLL VENOUS BLD VENIPUNCTURE: CPT | Performed by: INTERNAL MEDICINE

## 2021-04-22 PROCEDURE — 250N000013 HC RX MED GY IP 250 OP 250 PS 637: Performed by: INTERNAL MEDICINE

## 2021-04-22 PROCEDURE — 99232 SBSQ HOSP IP/OBS MODERATE 35: CPT | Performed by: PHYSICIAN ASSISTANT

## 2021-04-22 PROCEDURE — 84132 ASSAY OF SERUM POTASSIUM: CPT | Performed by: INTERNAL MEDICINE

## 2021-04-22 PROCEDURE — 97110 THERAPEUTIC EXERCISES: CPT | Mod: GO

## 2021-04-22 PROCEDURE — 83735 ASSAY OF MAGNESIUM: CPT | Performed by: STUDENT IN AN ORGANIZED HEALTH CARE EDUCATION/TRAINING PROGRAM

## 2021-04-22 PROCEDURE — 84132 ASSAY OF SERUM POTASSIUM: CPT | Performed by: STUDENT IN AN ORGANIZED HEALTH CARE EDUCATION/TRAINING PROGRAM

## 2021-04-22 PROCEDURE — 250N000013 HC RX MED GY IP 250 OP 250 PS 637: Performed by: STUDENT IN AN ORGANIZED HEALTH CARE EDUCATION/TRAINING PROGRAM

## 2021-04-22 PROCEDURE — 250N000013 HC RX MED GY IP 250 OP 250 PS 637: Performed by: PHYSICIAN ASSISTANT

## 2021-04-22 PROCEDURE — 250N000009 HC RX 250: Performed by: NURSE PRACTITIONER

## 2021-04-22 PROCEDURE — 250N000013 HC RX MED GY IP 250 OP 250 PS 637: Performed by: NURSE PRACTITIONER

## 2021-04-22 PROCEDURE — 250N000011 HC RX IP 250 OP 636: Performed by: PHYSICIAN ASSISTANT

## 2021-04-22 PROCEDURE — 80048 BASIC METABOLIC PNL TOTAL CA: CPT | Performed by: STUDENT IN AN ORGANIZED HEALTH CARE EDUCATION/TRAINING PROGRAM

## 2021-04-22 PROCEDURE — 85610 PROTHROMBIN TIME: CPT | Performed by: STUDENT IN AN ORGANIZED HEALTH CARE EDUCATION/TRAINING PROGRAM

## 2021-04-22 PROCEDURE — 85027 COMPLETE CBC AUTOMATED: CPT | Performed by: STUDENT IN AN ORGANIZED HEALTH CARE EDUCATION/TRAINING PROGRAM

## 2021-04-22 PROCEDURE — 999N001017 HC STATISTIC GLUCOSE BY METER IP

## 2021-04-22 PROCEDURE — 36415 COLL VENOUS BLD VENIPUNCTURE: CPT | Performed by: STUDENT IN AN ORGANIZED HEALTH CARE EDUCATION/TRAINING PROGRAM

## 2021-04-22 RX ORDER — WARFARIN SODIUM 7.5 MG/1
7.5 TABLET ORAL
Status: COMPLETED | OUTPATIENT
Start: 2021-04-22 | End: 2021-04-22

## 2021-04-22 RX ORDER — POTASSIUM CHLORIDE 750 MG/1
40 TABLET, EXTENDED RELEASE ORAL ONCE
Status: COMPLETED | OUTPATIENT
Start: 2021-04-22 | End: 2021-04-22

## 2021-04-22 RX ADMIN — ANAKINRA 100 MG: 100 INJECTION, SOLUTION SUBCUTANEOUS at 09:16

## 2021-04-22 RX ADMIN — ACETAMINOPHEN 650 MG: 325 TABLET, FILM COATED ORAL at 21:53

## 2021-04-22 RX ADMIN — ISOSORBIDE DINITRATE 10 MG: 10 TABLET ORAL at 20:03

## 2021-04-22 RX ADMIN — ACETAMINOPHEN 650 MG: 325 TABLET, FILM COATED ORAL at 13:40

## 2021-04-22 RX ADMIN — METHOCARBAMOL 500 MG: 500 TABLET, FILM COATED ORAL at 21:53

## 2021-04-22 RX ADMIN — METHOCARBAMOL 500 MG: 500 TABLET, FILM COATED ORAL at 05:37

## 2021-04-22 RX ADMIN — POTASSIUM CHLORIDE 40 MEQ: 750 TABLET, EXTENDED RELEASE ORAL at 09:15

## 2021-04-22 RX ADMIN — ISOSORBIDE DINITRATE 10 MG: 10 TABLET ORAL at 13:40

## 2021-04-22 RX ADMIN — GABAPENTIN 300 MG: 300 CAPSULE ORAL at 09:16

## 2021-04-22 RX ADMIN — METHOCARBAMOL 500 MG: 500 TABLET, FILM COATED ORAL at 13:40

## 2021-04-22 RX ADMIN — HYDRALAZINE HYDROCHLORIDE 75 MG: 50 TABLET ORAL at 09:16

## 2021-04-22 RX ADMIN — CETIRIZINE HYDROCHLORIDE 10 MG: 10 TABLET, FILM COATED ORAL at 09:17

## 2021-04-22 RX ADMIN — MONTELUKAST 10 MG: 10 TABLET, FILM COATED ORAL at 21:53

## 2021-04-22 RX ADMIN — BUMETANIDE 4 MG: 0.25 INJECTION INTRAMUSCULAR; INTRAVENOUS at 09:14

## 2021-04-22 RX ADMIN — AMIODARONE HYDROCHLORIDE 200 MG: 200 TABLET ORAL at 09:16

## 2021-04-22 RX ADMIN — BUMETANIDE 4 MG: 0.25 INJECTION INTRAMUSCULAR; INTRAVENOUS at 13:41

## 2021-04-22 RX ADMIN — ISOSORBIDE DINITRATE 10 MG: 10 TABLET ORAL at 09:16

## 2021-04-22 RX ADMIN — Medication 25 MG: at 21:53

## 2021-04-22 RX ADMIN — Medication 50 MG: at 05:37

## 2021-04-22 RX ADMIN — UMECLIDINIUM 1 PUFF: 62.5 AEROSOL, POWDER ORAL at 09:15

## 2021-04-22 RX ADMIN — FLUTICASONE FUROATE AND VILANTEROL TRIFENATATE 1 PUFF: 100; 25 POWDER RESPIRATORY (INHALATION) at 09:15

## 2021-04-22 RX ADMIN — HYDRALAZINE HYDROCHLORIDE 75 MG: 50 TABLET ORAL at 13:40

## 2021-04-22 RX ADMIN — Medication 10 MG: at 21:53

## 2021-04-22 RX ADMIN — Medication 50 MG: at 21:53

## 2021-04-22 RX ADMIN — ACETAMINOPHEN 650 MG: 325 TABLET, FILM COATED ORAL at 05:37

## 2021-04-22 RX ADMIN — POTASSIUM CHLORIDE 40 MEQ: 750 TABLET, EXTENDED RELEASE ORAL at 20:03

## 2021-04-22 RX ADMIN — Medication 50 MG: at 13:39

## 2021-04-22 RX ADMIN — ASPIRIN 81 MG CHEWABLE TABLET 81 MG: 81 TABLET CHEWABLE at 09:16

## 2021-04-22 RX ADMIN — ALLOPURINOL 300 MG: 300 TABLET ORAL at 09:15

## 2021-04-22 RX ADMIN — WARFARIN SODIUM 7.5 MG: 7.5 TABLET ORAL at 18:37

## 2021-04-22 RX ADMIN — ACARBOSE 25 MG: 25 TABLET ORAL at 05:43

## 2021-04-22 RX ADMIN — HYDRALAZINE HYDROCHLORIDE 75 MG: 50 TABLET ORAL at 20:03

## 2021-04-22 RX ADMIN — GABAPENTIN 600 MG: 300 CAPSULE ORAL at 21:52

## 2021-04-22 RX ADMIN — BUPROPION HYDROCHLORIDE 100 MG: 100 TABLET, EXTENDED RELEASE ORAL at 20:03

## 2021-04-22 RX ADMIN — DOCUSATE SODIUM 50 MG AND SENNOSIDES 8.6 MG 1 TABLET: 8.6; 5 TABLET, FILM COATED ORAL at 20:03

## 2021-04-22 RX ADMIN — BUPROPION HYDROCHLORIDE 100 MG: 100 TABLET, EXTENDED RELEASE ORAL at 09:16

## 2021-04-22 RX ADMIN — GABAPENTIN 600 MG: 300 CAPSULE ORAL at 13:41

## 2021-04-22 ASSESSMENT — ACTIVITIES OF DAILY LIVING (ADL)
ADLS_ACUITY_SCORE: 12

## 2021-04-22 ASSESSMENT — MIFFLIN-ST. JEOR: SCORE: 1784.2

## 2021-04-22 NOTE — PLAN OF CARE
Pt with history of NICM EF 20% c/b VT s/p CRT-D s/p HMII LVAD 6/19/2017 originally intended as destination therapy 2/2 obesity however with recent weight loss now candidate for transplantation. He is admitted for worsening functional status and renal function concerning for worsening heart failure. He is now listed status 2E for transplant.  VSS, LVAD #s WNL, SR 60s-70s with 0-10 PVCs. BG WNL. Good I&O. Sleep meds given at bedtime. Pt up in the room independently.

## 2021-04-22 NOTE — PLAN OF CARE
Temp: 98  F (36.7  C) Temp src: Oral BP: (!) 84/64 Pulse: 64   Resp: 19 SpO2: 96 % O2 Device: None (Room air)       D: Admitted with worsening HF, now status 2E awaiting transplant. Hx NICM s/p HM2 (2017), afib, RV failure, CKD, DM, CECILIA, HTN, asthma     I/A: Buckner (he/him) is A&O x4. Tele in place, SR. VSS on RA, CPAP HS. VAD numbers WDL. PIV in place, SL. Tylenol, tramadol, and Robaxin given for pain. Up independently. Slept well overnight     P: Continue to monitor and follow POC. Notify Cards 2 with changes

## 2021-04-22 NOTE — PROGRESS NOTES
[facility]   Cardiology II Service / Advanced Heart Failure  Daily Progress Note      Patient: Jim Willingham  MRN: [id number]  Admission Date: 2/8/2021  Hospital Day # 73    Assessment and Plan: Jim Willingham is a 65 year old male with history of NICM EF 20% c/b VT s/p CRT-D s/p HMII LVAD 6/19/2017 originally intended as destination therapy 2/2 obesity however with recent weight loss now candidate for transplantation. He is admitted for worsening functional status and renal function concerning for worsening heart failure. He is now listed status 2E for transplant.    Today's changes:  - Bumex 4 mg IV BID  - S/p diuril yesterday, will hold off for today     Chronic systolic heart failure secondary to NICM s/p HM II LVAD. Echo 1/8/21 at 9400 rpm, EF<30%m LVIDd 6.8cm, at least mildly reduced RV function, AoV closed without AI, mild-mod eccentric MR, dilated IVC without collapse. RHC 3/23 RA 10 mPA 25 mPCWP 14 Kee CO/CI 6.5/3.1.  Stage D, NYHA Class III  ACEi/ARB contraindicated due to renal dysfunction. Isordil 10 mg po TID. Hydralazine 75 mg po TID.   BB Contraindicated due to low cardiac output.   Aldosterone antagonist contraindicated due to renal dysfunction  SCD prophylaxis CRT-D  Fluid status: Euvolemic Bumex 4 mg po BID.   MAP: Goal 65-85, overall within goal   LDH: 316 4/16  Anticoagulation: Coumadin per pharmacy. INR- 2.90, goal 2.5-3, goal increased in setting of power spikes and elevated flows this hospitalization. Will give higher dose of Coumadin today.   Antiplatelet: ASA 81 mg po daily  - remains on the cardiac transplant list status 2E. Persistent escalation of diuretics in setting of progressive heart failure with refractory hypervolemia.   - Status 2 Extension due 04/23/2021    Low voltage alerts. Intermittent, initially improved with changing patient cord, now intermittently returned. Holding off on controller change for now.  - Exchanged clips today (back-up bag still has old  clips)  - CTM    WALTER on CKD Stage III. Cr peaked at 2.22.  - Cr- 1.81 (1.72).     History of Afib. History of VT/VF. Asymptomatic bursts of questionable AF vs NSVT without hemodynamic compromise. RHC 3/23 RA 10 mPA 25 mPCWP 14 Kee CO/CI 6.5/3.1. CHADSVASC-5.   - Continue Amiodarone 200 mg po daily.   - Coumadin as above.      DM type II, controlled. Symptomatic Hypoglycemia, resolved. Low Cortisol: Hgb A1C-6.2. Lantus and Novololg sliding scale insulin discontinued. Endo consult appreciated. C-peptide 13.9 an Proinsulin 18.4. Serum cortisol 7.7 3/6/21, cosyntropin stim test WNL per Endo.   -Per endo --> Fingerstick glucose might not be accurate for hypoglycemia, if patient has glucose <55 (without insulin), plasma glucose should be sent for confirmation of hypoglycemia prior to correction. While the patient is off insulin therapy, POC glucose readings above 60 are acceptable for the patient  - Appreciate Endocrine consult  - Continue Acarbose 25 mg po daily.  - If recurrent episode pt amenable to increasing Acarbose to 50 mg po daily.      Bilateral Shoulder Pain, Right > Left. Prior history of osteoarthritis to right shoulder per Xray in 2018. He notes ortho evaluation in 2015 with CT consistent with right rotator cuff tear (unable to obtain MRI due to LVAD). Has had steroid injections in the past with some improvement. 3 view X-ray of right shoulder with mild osteoarthritis and bone spur.  - Tramadol prn  - Reviewed with Ortho with recommendations for conservative approach. Follow up with Ortho following transplant for further evaluation.   - Continue Capcaicin cream prn.      Chronic/resolved conditions  COPD/Asthma: pta Breo Ellipta, albuterol prn.   Depression: pta Bupropion  Right sided weakness, resolved. CT head negative for acute findings. Appreciate Neuro eval.   Staph Hominis Bacteremia. No need for surveillance blood cx per transplant ID.    Rhinovirus, resolved. Bacterial bronchitis. Completed 5 day  "course of Cefdinir. COVID negative 2/8. Resp PCR +rhinovirus. CXR 2/12 with no overt PNA. Stopped cefdinir 2/15. Cleared for transplant per ID. Repeat RVP negative 3/5. COVID repeated due to sinus congestion, negative. Congestion improved with transition from Claritin to Zyrtec.   Carpel tunnel, bilateral s/p bilateral release. Evidence of thenar atrophy. Relief with steroid injection 11/20. Carpal tunnel release 2/18/21. Appreciate Ortho/Plastics consult, signed off. Continue Tylenol 650 mg po QID and Gabapentin 300 mg in AM, 600 mg in the afternoon, and 600 mg in the evening. Notified ortho of finger paresthesia, which is common after surgery per ortho. Lidocaine gel prn.      FEN: 3 gram sodium diet   PROPHY:  Coumadin  LINES:  PIV   DISPO:  TBD pending cardiac transplant.   CODE STATUS: Full Code   ================================================================    Interval History/ROS: He denies fever, chills, chest pain, palpitations, cough, nausea, vomiting, diarrhea, hematochezia, and melena. His LE edema and abdominal bloating are still present, but improved from yesterday. Breathing is better today. No cough. No PND. He is tolerating oral intake and ambulation. Mood is somewhat down today.    Last 24 hr care team notes reviewed.   ROS:  4 point ROS including Respiratory, CV, GI and , other than that noted in the HPI, is negative.     Medications: Reviewed in EPIC.     Physical Exam:   BP 94/76 (BP Location: Left arm)   Pulse 70   Temp 98.5  F (36.9  C) (Oral)   Resp 16   Ht 1.727 m (5' 8\")   Wt 101.5 kg (223 lb 11.2 oz)   SpO2 97%   BMI 34.01 kg/m    GENERAL: Appears alert and interacting appropriatly.  HEENT: Eye symmetrical and free of discharge bilaterally. Mucous membranes moist and without lesions.  NECK: Supple and without lymphadenopathy. JVD lower third of neck when upright  CV: RRR, S1S2 present with LVAD hum.   RESPIRATORY: Respirations regular, even, and unlabored. Lungs CTA throughout. "   GI: Soft and mildly distended with normoactive bowel sounds present in all quadrants. No tenderness, rebound, guarding. No organomegaly.   EXTREMITIES: Trace bilateral LE peripheral edema. 2+ bilateral pedal pulses.   NEUROLOGIC: Alert and interacting appropriatly. No focal deficits.   MUSCULOSKELETAL: No joint swelling or tenderness.   SKIN: No jaundice. No rashes or lesions. LVAD drive line covered.     Data:  CMP  Recent Labs   Lab 04/22/21 0527 04/21/21 0626 04/20/21  0631 04/19/21  0607    135 136 134   POTASSIUM 3.4 3.6 3.9 3.9   CHLORIDE 102 102 102 102   CO2 27 25 26 25   ANIONGAP 7 8 9 7   GLC 92 101* 88 90   BUN 51* 49* 48* 46*   CR 1.81* 1.72* 1.92* 1.72*   GFRESTIMATED 39* 41* 36* 41*   GFRESTBLACK 45* 48* 42* 48*   QAMAR 8.8 8.8 8.5 8.8   MAG 2.5* 2.6* 2.6* 2.6*   PROTTOTAL  --   --   --  6.7*   ALBUMIN  --   --   --  3.6   BILITOTAL  --   --   --  0.8   ALKPHOS  --   --   --  102   AST  --   --   --  34   ALT  --   --   --  36     CBC  Recent Labs   Lab 04/22/21 0527 04/19/21  0607   WBC 4.2 4.5   RBC 3.90* 3.99*   HGB 11.9* 12.2*   HCT 37.2* 38.0*   MCV 95 95   MCH 30.5 30.6   MCHC 32.0 32.1   RDW 15.4* 15.2*    175     INR  Recent Labs   Lab 04/22/21 0527 04/21/21 0626 04/20/21  0631 04/19/21  0607   INR 2.90* 2.81* 2.67* 2.84*       Patient discussed with Dr. Seth.      Didi Butler, PAJanetC  [facility]

## 2021-04-22 NOTE — PLAN OF CARE
VSS, PRN's keeping joint pain under control, VAD numbers unremarkable, sinus rhythm with occasional PVC's, VAD alarms checked and in place, VAD dressing changed. Continuing to monitor.

## 2021-04-23 LAB
ANION GAP SERPL CALCULATED.3IONS-SCNC: 6 MMOL/L (ref 3–14)
ANION GAP SERPL CALCULATED.3IONS-SCNC: 6 MMOL/L (ref 3–14)
BUN SERPL-MCNC: 50 MG/DL (ref 7–30)
BUN SERPL-MCNC: 52 MG/DL (ref 7–30)
CALCIUM SERPL-MCNC: 8.6 MG/DL (ref 8.5–10.1)
CALCIUM SERPL-MCNC: 9 MG/DL (ref 8.5–10.1)
CHLORIDE SERPL-SCNC: 100 MMOL/L (ref 94–109)
CHLORIDE SERPL-SCNC: 101 MMOL/L (ref 94–109)
CO2 SERPL-SCNC: 25 MMOL/L (ref 20–32)
CO2 SERPL-SCNC: 29 MMOL/L (ref 20–32)
CREAT SERPL-MCNC: 1.69 MG/DL (ref 0.66–1.25)
CREAT SERPL-MCNC: 1.74 MG/DL (ref 0.66–1.25)
GFR SERPL CREATININE-BSD FRML MDRD: 41 ML/MIN/{1.73_M2}
GFR SERPL CREATININE-BSD FRML MDRD: 42 ML/MIN/{1.73_M2}
GLUCOSE BLDC GLUCOMTR-MCNC: 131 MG/DL (ref 70–99)
GLUCOSE BLDC GLUCOMTR-MCNC: 131 MG/DL (ref 70–99)
GLUCOSE BLDC GLUCOMTR-MCNC: 168 MG/DL (ref 70–99)
GLUCOSE BLDC GLUCOMTR-MCNC: 89 MG/DL (ref 70–99)
GLUCOSE SERPL-MCNC: 111 MG/DL (ref 70–99)
GLUCOSE SERPL-MCNC: 83 MG/DL (ref 70–99)
INR PPP: 2.79 (ref 0.86–1.14)
LABORATORY COMMENT REPORT: NORMAL
LDH SERPL L TO P-CCNC: 319 U/L (ref 85–227)
MAGNESIUM SERPL-MCNC: 2.7 MG/DL (ref 1.6–2.3)
POTASSIUM SERPL-SCNC: 3.8 MMOL/L (ref 3.4–5.3)
POTASSIUM SERPL-SCNC: 3.8 MMOL/L (ref 3.4–5.3)
SARS-COV-2 RNA RESP QL NAA+PROBE: NEGATIVE
SODIUM SERPL-SCNC: 132 MMOL/L (ref 133–144)
SODIUM SERPL-SCNC: 134 MMOL/L (ref 133–144)
SPECIMEN SOURCE: NORMAL

## 2021-04-23 PROCEDURE — 214N000001 HC R&B CCU UMMC

## 2021-04-23 PROCEDURE — 250N000013 HC RX MED GY IP 250 OP 250 PS 637: Performed by: INTERNAL MEDICINE

## 2021-04-23 PROCEDURE — 80048 BASIC METABOLIC PNL TOTAL CA: CPT | Performed by: NURSE PRACTITIONER

## 2021-04-23 PROCEDURE — 36415 COLL VENOUS BLD VENIPUNCTURE: CPT | Performed by: NURSE PRACTITIONER

## 2021-04-23 PROCEDURE — 80048 BASIC METABOLIC PNL TOTAL CA: CPT | Performed by: STUDENT IN AN ORGANIZED HEALTH CARE EDUCATION/TRAINING PROGRAM

## 2021-04-23 PROCEDURE — 93750 INTERROGATION VAD IN PERSON: CPT | Performed by: NURSE PRACTITIONER

## 2021-04-23 PROCEDURE — 250N000013 HC RX MED GY IP 250 OP 250 PS 637: Performed by: PHYSICIAN ASSISTANT

## 2021-04-23 PROCEDURE — 250N000013 HC RX MED GY IP 250 OP 250 PS 637: Performed by: NURSE PRACTITIONER

## 2021-04-23 PROCEDURE — U0003 INFECTIOUS AGENT DETECTION BY NUCLEIC ACID (DNA OR RNA); SEVERE ACUTE RESPIRATORY SYNDROME CORONAVIRUS 2 (SARS-COV-2) (CORONAVIRUS DISEASE [COVID-19]), AMPLIFIED PROBE TECHNIQUE, MAKING USE OF HIGH THROUGHPUT TECHNOLOGIES AS DESCRIBED BY CMS-2020-01-R: HCPCS | Performed by: INTERNAL MEDICINE

## 2021-04-23 PROCEDURE — 250N000013 HC RX MED GY IP 250 OP 250 PS 637: Performed by: STUDENT IN AN ORGANIZED HEALTH CARE EDUCATION/TRAINING PROGRAM

## 2021-04-23 PROCEDURE — U0005 INFEC AGEN DETEC AMPLI PROBE: HCPCS | Performed by: INTERNAL MEDICINE

## 2021-04-23 PROCEDURE — 99232 SBSQ HOSP IP/OBS MODERATE 35: CPT | Mod: 25 | Performed by: NURSE PRACTITIONER

## 2021-04-23 PROCEDURE — 83735 ASSAY OF MAGNESIUM: CPT | Performed by: STUDENT IN AN ORGANIZED HEALTH CARE EDUCATION/TRAINING PROGRAM

## 2021-04-23 PROCEDURE — 84132 ASSAY OF SERUM POTASSIUM: CPT | Performed by: STUDENT IN AN ORGANIZED HEALTH CARE EDUCATION/TRAINING PROGRAM

## 2021-04-23 PROCEDURE — 83615 LACTATE (LD) (LDH) ENZYME: CPT | Performed by: STUDENT IN AN ORGANIZED HEALTH CARE EDUCATION/TRAINING PROGRAM

## 2021-04-23 PROCEDURE — 250N000011 HC RX IP 250 OP 636: Performed by: PHYSICIAN ASSISTANT

## 2021-04-23 PROCEDURE — 36415 COLL VENOUS BLD VENIPUNCTURE: CPT | Performed by: STUDENT IN AN ORGANIZED HEALTH CARE EDUCATION/TRAINING PROGRAM

## 2021-04-23 PROCEDURE — 999N001017 HC STATISTIC GLUCOSE BY METER IP

## 2021-04-23 PROCEDURE — 85610 PROTHROMBIN TIME: CPT | Performed by: STUDENT IN AN ORGANIZED HEALTH CARE EDUCATION/TRAINING PROGRAM

## 2021-04-23 PROCEDURE — 250N000009 HC RX 250: Performed by: NURSE PRACTITIONER

## 2021-04-23 RX ORDER — POTASSIUM CHLORIDE 750 MG/1
20 TABLET, EXTENDED RELEASE ORAL ONCE
Status: COMPLETED | OUTPATIENT
Start: 2021-04-23 | End: 2021-04-23

## 2021-04-23 RX ORDER — OXYCODONE HYDROCHLORIDE 5 MG/1
5 TABLET ORAL ONCE
Status: COMPLETED | OUTPATIENT
Start: 2021-04-23 | End: 2021-04-23

## 2021-04-23 RX ORDER — POTASSIUM CHLORIDE 1.5 G/1.58G
20 POWDER, FOR SOLUTION ORAL ONCE
Status: COMPLETED | OUTPATIENT
Start: 2021-04-23 | End: 2021-04-23

## 2021-04-23 RX ORDER — WARFARIN SODIUM 7.5 MG/1
7.5 TABLET ORAL
Status: COMPLETED | OUTPATIENT
Start: 2021-04-23 | End: 2021-04-23

## 2021-04-23 RX ADMIN — ACETAMINOPHEN 650 MG: 325 TABLET, FILM COATED ORAL at 09:15

## 2021-04-23 RX ADMIN — METHOCARBAMOL 500 MG: 500 TABLET, FILM COATED ORAL at 05:18

## 2021-04-23 RX ADMIN — ASPIRIN 81 MG CHEWABLE TABLET 81 MG: 81 TABLET CHEWABLE at 08:42

## 2021-04-23 RX ADMIN — Medication 50 MG: at 13:38

## 2021-04-23 RX ADMIN — HYDRALAZINE HYDROCHLORIDE 75 MG: 50 TABLET ORAL at 13:37

## 2021-04-23 RX ADMIN — BUPROPION HYDROCHLORIDE 100 MG: 100 TABLET, EXTENDED RELEASE ORAL at 20:49

## 2021-04-23 RX ADMIN — CHLOROTHIAZIDE 250 MG: 250 SUSPENSION ORAL at 13:43

## 2021-04-23 RX ADMIN — HYDRALAZINE HYDROCHLORIDE 75 MG: 50 TABLET ORAL at 08:41

## 2021-04-23 RX ADMIN — POTASSIUM CHLORIDE 20 MEQ: 750 TABLET, EXTENDED RELEASE ORAL at 08:41

## 2021-04-23 RX ADMIN — ISOSORBIDE DINITRATE 10 MG: 10 TABLET ORAL at 08:41

## 2021-04-23 RX ADMIN — ISOSORBIDE DINITRATE 10 MG: 10 TABLET ORAL at 13:38

## 2021-04-23 RX ADMIN — BUPROPION HYDROCHLORIDE 100 MG: 100 TABLET, EXTENDED RELEASE ORAL at 08:40

## 2021-04-23 RX ADMIN — CETIRIZINE HYDROCHLORIDE 10 MG: 10 TABLET, FILM COATED ORAL at 08:41

## 2021-04-23 RX ADMIN — BUMETANIDE 4 MG: 0.25 INJECTION INTRAMUSCULAR; INTRAVENOUS at 14:26

## 2021-04-23 RX ADMIN — GABAPENTIN 300 MG: 300 CAPSULE ORAL at 08:40

## 2021-04-23 RX ADMIN — POTASSIUM CHLORIDE 40 MEQ: 750 TABLET, EXTENDED RELEASE ORAL at 08:40

## 2021-04-23 RX ADMIN — ACETAMINOPHEN 650 MG: 325 TABLET, FILM COATED ORAL at 05:18

## 2021-04-23 RX ADMIN — AMIODARONE HYDROCHLORIDE 200 MG: 200 TABLET ORAL at 08:42

## 2021-04-23 RX ADMIN — METHOCARBAMOL 500 MG: 500 TABLET, FILM COATED ORAL at 20:58

## 2021-04-23 RX ADMIN — OXYCODONE HYDROCHLORIDE 5 MG: 5 TABLET ORAL at 09:15

## 2021-04-23 RX ADMIN — HYDRALAZINE HYDROCHLORIDE 75 MG: 50 TABLET ORAL at 20:48

## 2021-04-23 RX ADMIN — GABAPENTIN 600 MG: 300 CAPSULE ORAL at 22:49

## 2021-04-23 RX ADMIN — METHOCARBAMOL 500 MG: 500 TABLET, FILM COATED ORAL at 13:38

## 2021-04-23 RX ADMIN — Medication 25 MG: at 22:51

## 2021-04-23 RX ADMIN — Medication 50 MG: at 05:15

## 2021-04-23 RX ADMIN — WARFARIN SODIUM 7.5 MG: 7.5 TABLET ORAL at 17:21

## 2021-04-23 RX ADMIN — POTASSIUM CHLORIDE 20 MEQ: 1.5 POWDER, FOR SOLUTION ORAL at 19:13

## 2021-04-23 RX ADMIN — MONTELUKAST 10 MG: 10 TABLET, FILM COATED ORAL at 22:49

## 2021-04-23 RX ADMIN — Medication 10 MG: at 22:49

## 2021-04-23 RX ADMIN — FLUTICASONE FUROATE AND VILANTEROL TRIFENATATE 1 PUFF: 100; 25 POWDER RESPIRATORY (INHALATION) at 08:42

## 2021-04-23 RX ADMIN — ISOSORBIDE DINITRATE 10 MG: 10 TABLET ORAL at 20:48

## 2021-04-23 RX ADMIN — ALLOPURINOL 300 MG: 300 TABLET ORAL at 08:41

## 2021-04-23 RX ADMIN — BUMETANIDE 4 MG: 0.25 INJECTION INTRAMUSCULAR; INTRAVENOUS at 08:42

## 2021-04-23 RX ADMIN — OXYCODONE HYDROCHLORIDE 5 MG: 5 TABLET ORAL at 17:21

## 2021-04-23 RX ADMIN — UMECLIDINIUM 1 PUFF: 62.5 AEROSOL, POWDER ORAL at 08:42

## 2021-04-23 RX ADMIN — ANAKINRA 100 MG: 100 INJECTION, SOLUTION SUBCUTANEOUS at 08:40

## 2021-04-23 RX ADMIN — ACETAMINOPHEN 650 MG: 325 TABLET, FILM COATED ORAL at 13:38

## 2021-04-23 RX ADMIN — GABAPENTIN 600 MG: 300 CAPSULE ORAL at 13:37

## 2021-04-23 RX ADMIN — POTASSIUM CHLORIDE 40 MEQ: 750 TABLET, EXTENDED RELEASE ORAL at 20:48

## 2021-04-23 RX ADMIN — DOCUSATE SODIUM 50 MG AND SENNOSIDES 8.6 MG 2 TABLET: 8.6; 5 TABLET, FILM COATED ORAL at 20:48

## 2021-04-23 ASSESSMENT — ACTIVITIES OF DAILY LIVING (ADL)
ADLS_ACUITY_SCORE: 12
ADLS_ACUITY_SCORE: 13
ADLS_ACUITY_SCORE: 12
ADLS_ACUITY_SCORE: 13
ADLS_ACUITY_SCORE: 12
ADLS_ACUITY_SCORE: 13

## 2021-04-23 ASSESSMENT — MIFFLIN-ST. JEOR: SCORE: 1805.06

## 2021-04-23 NOTE — PROGRESS NOTES
D:  Stopped by to check in with patient.  Pt has had random low voltage alarms.  Pt cable was changed.  Contacts have been cleaned.  I:  Switched pt to his backup clips.  P:  Will continue to monitor for alarms or low voltage.

## 2021-04-23 NOTE — PLAN OF CARE
Dx: Pt is admitted  on 2/8/21 for worsening HF and renal function  He is now listed status 2E for transplant.   Neuro: alox4  Cardiac: SR(60-70) with PVCs  Respiratory: LSC. On RA.   GI/: Good appetite (eating food from home). No BM today.   : Good UO. Keeping a strict I and O's. Replaced K=3.8 per protocol.   Activity: up ad francisco in room  Pain: c/o left hand pain. Pt reported good pain relief after 5mg of Oxycodone.   Skin: WDL  LDA's: VAD drive line. PIV-SL  Plan: Continue to monitor. Awaiting heart transplant.

## 2021-04-23 NOTE — PLAN OF CARE
Pt with HM2 is status 2E awaiting a heart transplant. Up independently in the room. VS and LVAD #s WNL. SR 60-90s with 0-12 PVCs/0-12 PACs. Good I&O. Blood sugars WNL. A&O, very pleasant.

## 2021-04-23 NOTE — PLAN OF CARE
Patient staus 2E for heart transplant. Alert and oriented, VSS, VAD numbers WDL. VAD alarms checked and in place, dressing changed. Sinus rhythm with frequent PAC's, PVC's. HR 60's - 70's. Afebrile. Pain in bilateral hands this AM despite PRN's. Bilateral hand edema, 8/10 pain reported with limited joint mobility. Cards 2 alerted; given one-time dose oral oxy. Pain alleviated to tolerable 5/10; mobility improved in hands. Supplementing IV Bumex with oral Diuril. Good urine output. Continuing to monitor.

## 2021-04-23 NOTE — PROGRESS NOTES
[facility]   Cardiology II Service / Advanced Heart Failure  Daily Progress Note  Date of Service: 4/23/2021      Patient: Jim Willingham  MRN: [id number]  Admission Date: 2/8/2021  Hospital Day # 74    Assessment and Plan: Jim Willingham is a 65 year old male with history of NICM EF 20% c/b VT s/p CRT-D s/p HMII LVAD 6/19/2017 originally intended as destination therapy 2/2 obesity however with recent weight loss now candidate for transplantation. He is admitted for worsening functional status and renal function concerning for worsening heart failure. He is now listed status 2E for transplant.     Chronic systolic heart failure secondary to NICM s/p HM II LVAD. Echo 1/8/21 at 9400 rpm, EF<30%m LVIDd 6.8cm, at least mildly reduced RV function, AoV closed without AI, mild-mod eccentric MR, dilated IVC without collapse. RHC 3/23 RA 10 mPA 25 mPCWP 14 Kee CO/CI 6.5/3.1.  Stage D, NYHA Class III  ACEi/ARB contraindicated due to renal dysfunction. Isordil 10 mg po TID. Hydralazine increased to 75 mg po TID.   BB Contraindicated due to low cardiac output.   Aldosterone antagonist contraindicated due to renal dysfunction  SCD prophylaxis CRT-D  Fluid status: Hypervolemic. Bumex 4 mg IV BID. Diuril 250 mg times one.   MAP: 66  LDH: 319  Anticoagulation: Coumadin per pharmacy. INR- 2.79, goal 2.5-3, goal increased in setting of power spikes and elevated flows this hospitalization. Will give higher dose of Coumadin today.   Antiplatelet: ASA 81 mg po daily  - remains on the cardiac transplant list status 2E. Persistent escalation of diuretics in setting of progressive heart failure with refractory hypervolemia.      The patient's HeartMate LVAD was interrogated 4/23/2021  * Speed 9600 rpm   * Pulsatility index 3.9   * Power 6.8 Manuel   * Flow 6.5 L/minute   Fluid status: mild hypervolemia.   Alarms were reviewed, and notable for PI events. No evidence of low voltage alarms.   All external components were  inspected and showed no evidence of damage or malfunction.     WALTRE on CKD Stage III. Cr peaked at 2.22.  - Cr- 1.74 (1.85).      History of Afib. History of VT/VF. Asymptomatic bursts of questionable AF vs NSVT without hemodynamic compromise. RHC 3/23 RA 10 mPA 25 mPCWP 14 Kee CO/CI 6.5/3.1. CHADSVASC-5.   - Continue Amiodarone 200 mg po daily.   - Coumadin as above.      DM type II, controlled. Symptomatic Hypoglycemia, resolved. Low Cortisol: Hgb A1C-6.2. Lantus and Novololg sliding scale insulin discontinued. Endo consult appreciated. C-peptide 13.9 an Proinsulin 18.4. Serum cortisol 7.7 3/6/21, cosyntropin stim test WNL per Endo.   -Per endo --> Fingerstick glucose might not be accurate for hypoglycemia, if patient has glucose <55 (without insulin), plasma glucose should be sent for confirmation of hypoglycemia prior to correction. While the patient is off insulin therapy, POC glucose readings above 60 are acceptable for the patient  - postprandial hypoglycemia this AM. Endo notified, continue Acarbose 25 mg po daily. If recurrent episode pt amenable to increasing Acarbose to 50 mg po daily.     Carpel tunnel, bilateral s/p bilateral release. Evidence of thenar atrophy. Relief with steroid injection 11/20. Carpal tunnel release 2/18/21. Appreciate Ortho/Plastics consult, signed off. Continue Tylenol 650 mg po QID and Gabapentin 300 mg in AM, 600 mg in the afternoon, and 600 mg in the evening.   - Lidocaine gel prn.   - Case discussed with Ortho, awaiting further recs.   - Tramadol prn, Oxycodone times one today.      Chronic/resolved conditions  COPD/Asthma: pta Breo Ellipta, albuterol prn.   Depression: pta Bupropion  Right sided weakness, resolved. CT head negative for acute findings. Appreciate Neuro eval.   Staph Hominis Bacteremia. No need for surveillance blood cx per transplant ID.    Rhinovirus, resolved. Bacterial bronchitis. Completed 5 day course of Cefdinir. COVID negative 2/8. Resp PCR +rhinovirus.  "CXR 2/12 with no overt PNA. Stopped cefdinir 2/15. Cleared for transplant per ID. Repeat RVP negative 3/5. COVID repeated due to sinus congestion, negative. Congestion improved with transition from Claritin to Zyrtec.      FEN: 3 gram sodium diet   PROPHY:  Coumadin  LINES:  PIV   DISPO:  TBD pending cardiac transplant.   CODE STATUS: Full Code   ================================================================  Interval History/ROS: He complains of severe bilateral hand pain today with paresthesia, mild improvement with lidocaine gel. He complains of abdominal bloating, UE and LE edema, and RESENDIZ. He denies fever, chills, chest pain, palpitations, cough, nausea, vomiting, diarrhea, melena, and hematochezia. He is tolerating decreased oral intake this AM due to pain and ambulation.     Last 24 hr care team notes reviewed.   ROS:  4 point ROS including Respiratory, CV, GI and , other than that noted in the HPI, is negative.     Medications: Reviewed in EPIC.     Physical Exam:   BP 90/82 (BP Location: Left arm)   Pulse 68   Temp 98.2  F (36.8  C) (Oral)   Resp 16   Ht 1.727 m (5' 8\")   Wt 103.6 kg (228 lb 4.8 oz)   SpO2 96%   BMI 34.71 kg/m    GENERAL: Appears alert and oriented times three.   HEENT: Eye symmetrical and free of discharge bilaterally. Mucous membranes moist and without lesions.  NECK: Supple and without lymphadenopathy. JVD 12 cm  CV: RRR, S1S2 present with LVAD hum  RESPIRATORY: Respirations regular, even, and unlabored. Lungs CTA throughout.   GI: Soft and mildly distended with normoactive bowel sounds present in all quadrants. No tenderness, rebound, guarding. No organomegaly.   EXTREMITIES: +1 bilateral UE and LE peripheral edema. 2+ bilateral pedal pulses.   NEUROLOGIC: Alert and orientated x 3. CN II-XII grossly intact. No focal deficits.   MUSCULOSKELETAL: No joint swelling or tenderness.   SKIN: No jaundice. No rashes or lesions. LVAD drive line covered.     Data:  [facility]  Recent Labs   Lab " 04/23/21  0635 04/22/21  1406 04/22/21  0527 04/21/21  0626 04/20/21  0631 04/19/21  0607   *  --  136 135 136 134   POTASSIUM 3.8 3.9 3.4 3.6 3.9 3.9   CHLORIDE 101  --  102 102 102 102   CO2 25  --  27 25 26 25   ANIONGAP 6  --  7 8 9 7   GLC 83  --  92 101* 88 90   BUN 52*  --  51* 49* 48* 46*   CR 1.74*  --  1.81* 1.72* 1.92* 1.72*   GFRESTIMATED 41*  --  39* 41* 36* 41*   GFRESTBLACK 47*  --  45* 48* 42* 48*   QAMAR 8.6  --  8.8 8.8 8.5 8.8   MAG 2.7*  --  2.5* 2.6* 2.6* 2.6*   PROTTOTAL  --   --   --   --   --  6.7*   ALBUMIN  --   --   --   --   --  3.6   BILITOTAL  --   --   --   --   --  0.8   ALKPHOS  --   --   --   --   --  102   AST  --   --   --   --   --  34   ALT  --   --   --   --   --  36     CBC  Recent Labs   Lab 04/22/21  0527 04/19/21  0607   WBC 4.2 4.5   RBC 3.90* 3.99*   HGB 11.9* 12.2*   HCT 37.2* 38.0*   MCV 95 95   MCH 30.5 30.6   MCHC 32.0 32.1   RDW 15.4* 15.2*    175     INR  Recent Labs   Lab 04/23/21  0635 04/22/21  0527 04/21/21  0626 04/20/21  0631   INR 2.79* 2.90* 2.81* 2.67*       Patient discussed with Dr. Nguyen.      Soraya Marshall FN  4/23/2021

## 2021-04-23 NOTE — PROGRESS NOTES
LVAD/Transplant Social Work Services Progress Note      Date of Initial Social Work Evaluation: 2/10/2021  Collaborated with: Pt and his wife, Cate, at the bedside    Data: Pt is an LVAD pt now listed status 2E for heart transplant on 6C.     Intervention: Supportive Visit   Assessment: Pt continues to display good coping skills and has no mental health concerns. Pt engaged in Heart Transplant virtual support group yesterday and continues to have a positive experience in this group. Pt has no questions or concerns today.   Education provided by SW: Ongoing Social Work support   Plan:    Discharge Plans in Progress: None     Barriers to d/c plan: Awaiting Heart Transplant     Follow up Plan: CLIFF to continue to follow.

## 2021-04-24 LAB
ANION GAP SERPL CALCULATED.3IONS-SCNC: 10 MMOL/L (ref 3–14)
BUN SERPL-MCNC: 50 MG/DL (ref 7–30)
CALCIUM SERPL-MCNC: 8.9 MG/DL (ref 8.5–10.1)
CHLORIDE SERPL-SCNC: 98 MMOL/L (ref 94–109)
CO2 SERPL-SCNC: 26 MMOL/L (ref 20–32)
CREAT SERPL-MCNC: 1.77 MG/DL (ref 0.66–1.25)
GFR SERPL CREATININE-BSD FRML MDRD: 40 ML/MIN/{1.73_M2}
GLUCOSE BLDC GLUCOMTR-MCNC: 100 MG/DL (ref 70–99)
GLUCOSE BLDC GLUCOMTR-MCNC: 66 MG/DL (ref 70–99)
GLUCOSE BLDC GLUCOMTR-MCNC: 84 MG/DL (ref 70–99)
GLUCOSE BLDC GLUCOMTR-MCNC: 96 MG/DL (ref 70–99)
GLUCOSE BLDC GLUCOMTR-MCNC: 97 MG/DL (ref 70–99)
GLUCOSE SERPL-MCNC: 92 MG/DL (ref 70–99)
INR PPP: 2.54 (ref 0.86–1.14)
MAGNESIUM SERPL-MCNC: 2.6 MG/DL (ref 1.6–2.3)
POTASSIUM SERPL-SCNC: 3.8 MMOL/L (ref 3.4–5.3)
SODIUM SERPL-SCNC: 134 MMOL/L (ref 133–144)

## 2021-04-24 PROCEDURE — 36415 COLL VENOUS BLD VENIPUNCTURE: CPT | Performed by: STUDENT IN AN ORGANIZED HEALTH CARE EDUCATION/TRAINING PROGRAM

## 2021-04-24 PROCEDURE — 250N000009 HC RX 250: Performed by: NURSE PRACTITIONER

## 2021-04-24 PROCEDURE — 80048 BASIC METABOLIC PNL TOTAL CA: CPT | Performed by: STUDENT IN AN ORGANIZED HEALTH CARE EDUCATION/TRAINING PROGRAM

## 2021-04-24 PROCEDURE — 250N000013 HC RX MED GY IP 250 OP 250 PS 637: Performed by: NURSE PRACTITIONER

## 2021-04-24 PROCEDURE — 250N000013 HC RX MED GY IP 250 OP 250 PS 637: Performed by: INTERNAL MEDICINE

## 2021-04-24 PROCEDURE — 93750 INTERROGATION VAD IN PERSON: CPT | Performed by: NURSE PRACTITIONER

## 2021-04-24 PROCEDURE — 999N000128 HC STATISTIC PERIPHERAL IV START W/O US GUIDANCE

## 2021-04-24 PROCEDURE — 999N001017 HC STATISTIC GLUCOSE BY METER IP

## 2021-04-24 PROCEDURE — 99232 SBSQ HOSP IP/OBS MODERATE 35: CPT | Mod: 25 | Performed by: NURSE PRACTITIONER

## 2021-04-24 PROCEDURE — 83735 ASSAY OF MAGNESIUM: CPT | Performed by: STUDENT IN AN ORGANIZED HEALTH CARE EDUCATION/TRAINING PROGRAM

## 2021-04-24 PROCEDURE — 85610 PROTHROMBIN TIME: CPT | Performed by: STUDENT IN AN ORGANIZED HEALTH CARE EDUCATION/TRAINING PROGRAM

## 2021-04-24 PROCEDURE — 250N000013 HC RX MED GY IP 250 OP 250 PS 637: Performed by: STUDENT IN AN ORGANIZED HEALTH CARE EDUCATION/TRAINING PROGRAM

## 2021-04-24 PROCEDURE — 250N000013 HC RX MED GY IP 250 OP 250 PS 637: Performed by: PHYSICIAN ASSISTANT

## 2021-04-24 PROCEDURE — 214N000001 HC R&B CCU UMMC

## 2021-04-24 PROCEDURE — 250N000011 HC RX IP 250 OP 636: Performed by: PHYSICIAN ASSISTANT

## 2021-04-24 RX ORDER — BUMETANIDE 2 MG/1
4 TABLET ORAL
Status: DISCONTINUED | OUTPATIENT
Start: 2021-04-24 | End: 2021-05-06

## 2021-04-24 RX ORDER — SIMETHICONE 80 MG
80 TABLET,CHEWABLE ORAL EVERY 6 HOURS PRN
Status: DISCONTINUED | OUTPATIENT
Start: 2021-04-24 | End: 2021-05-31 | Stop reason: HOSPADM

## 2021-04-24 RX ORDER — OXYCODONE HYDROCHLORIDE 5 MG/1
5 TABLET ORAL ONCE
Status: COMPLETED | OUTPATIENT
Start: 2021-04-24 | End: 2021-04-24

## 2021-04-24 RX ORDER — POTASSIUM CHLORIDE 750 MG/1
20 TABLET, EXTENDED RELEASE ORAL ONCE
Status: COMPLETED | OUTPATIENT
Start: 2021-04-24 | End: 2021-04-24

## 2021-04-24 RX ADMIN — SIMETHICONE 80 MG: 80 TABLET, CHEWABLE ORAL at 20:59

## 2021-04-24 RX ADMIN — ISOSORBIDE DINITRATE 10 MG: 10 TABLET ORAL at 19:36

## 2021-04-24 RX ADMIN — ACETAMINOPHEN 650 MG: 325 TABLET, FILM COATED ORAL at 22:40

## 2021-04-24 RX ADMIN — GABAPENTIN 600 MG: 300 CAPSULE ORAL at 13:30

## 2021-04-24 RX ADMIN — CETIRIZINE HYDROCHLORIDE 10 MG: 10 TABLET, FILM COATED ORAL at 08:03

## 2021-04-24 RX ADMIN — OXYCODONE HYDROCHLORIDE 5 MG: 5 TABLET ORAL at 22:40

## 2021-04-24 RX ADMIN — ACETAMINOPHEN 650 MG: 325 TABLET, FILM COATED ORAL at 13:30

## 2021-04-24 RX ADMIN — GABAPENTIN 300 MG: 300 CAPSULE ORAL at 08:03

## 2021-04-24 RX ADMIN — ASPIRIN 81 MG CHEWABLE TABLET 81 MG: 81 TABLET CHEWABLE at 08:03

## 2021-04-24 RX ADMIN — Medication 50 MG: at 04:00

## 2021-04-24 RX ADMIN — ISOSORBIDE DINITRATE 10 MG: 10 TABLET ORAL at 13:30

## 2021-04-24 RX ADMIN — POTASSIUM CHLORIDE 20 MEQ: 750 TABLET, EXTENDED RELEASE ORAL at 11:00

## 2021-04-24 RX ADMIN — GABAPENTIN 600 MG: 300 CAPSULE ORAL at 22:03

## 2021-04-24 RX ADMIN — FLUTICASONE FUROATE AND VILANTEROL TRIFENATATE 1 PUFF: 100; 25 POWDER RESPIRATORY (INHALATION) at 08:02

## 2021-04-24 RX ADMIN — ANAKINRA 100 MG: 100 INJECTION, SOLUTION SUBCUTANEOUS at 08:03

## 2021-04-24 RX ADMIN — BUMETANIDE 4 MG: 0.25 INJECTION INTRAMUSCULAR; INTRAVENOUS at 08:02

## 2021-04-24 RX ADMIN — BUPROPION HYDROCHLORIDE 100 MG: 100 TABLET, EXTENDED RELEASE ORAL at 19:36

## 2021-04-24 RX ADMIN — DOCUSATE SODIUM 50 MG AND SENNOSIDES 8.6 MG 2 TABLET: 8.6; 5 TABLET, FILM COATED ORAL at 19:35

## 2021-04-24 RX ADMIN — HYDRALAZINE HYDROCHLORIDE 75 MG: 50 TABLET ORAL at 13:30

## 2021-04-24 RX ADMIN — HYDRALAZINE HYDROCHLORIDE 75 MG: 50 TABLET ORAL at 08:03

## 2021-04-24 RX ADMIN — HYDRALAZINE HYDROCHLORIDE 75 MG: 50 TABLET ORAL at 19:36

## 2021-04-24 RX ADMIN — ALLOPURINOL 300 MG: 300 TABLET ORAL at 08:03

## 2021-04-24 RX ADMIN — POTASSIUM CHLORIDE 40 MEQ: 750 TABLET, EXTENDED RELEASE ORAL at 19:36

## 2021-04-24 RX ADMIN — AMIODARONE HYDROCHLORIDE 200 MG: 200 TABLET ORAL at 08:03

## 2021-04-24 RX ADMIN — BUPROPION HYDROCHLORIDE 100 MG: 100 TABLET, EXTENDED RELEASE ORAL at 08:03

## 2021-04-24 RX ADMIN — ACETAMINOPHEN 650 MG: 325 TABLET, FILM COATED ORAL at 04:00

## 2021-04-24 RX ADMIN — POTASSIUM CHLORIDE 40 MEQ: 750 TABLET, EXTENDED RELEASE ORAL at 08:03

## 2021-04-24 RX ADMIN — ACARBOSE 25 MG: 25 TABLET ORAL at 07:13

## 2021-04-24 RX ADMIN — Medication 50 MG: at 11:03

## 2021-04-24 RX ADMIN — MONTELUKAST 10 MG: 10 TABLET, FILM COATED ORAL at 22:01

## 2021-04-24 RX ADMIN — METHOCARBAMOL 500 MG: 500 TABLET, FILM COATED ORAL at 11:03

## 2021-04-24 RX ADMIN — BUMETANIDE 4 MG: 2 TABLET ORAL at 16:44

## 2021-04-24 RX ADMIN — OXYCODONE HYDROCHLORIDE 5 MG: 5 TABLET ORAL at 08:01

## 2021-04-24 RX ADMIN — WARFARIN SODIUM 9 MG: 4 TABLET ORAL at 18:00

## 2021-04-24 RX ADMIN — ISOSORBIDE DINITRATE 10 MG: 10 TABLET ORAL at 08:03

## 2021-04-24 RX ADMIN — Medication 25 MG: at 22:01

## 2021-04-24 RX ADMIN — UMECLIDINIUM 1 PUFF: 62.5 AEROSOL, POWDER ORAL at 08:02

## 2021-04-24 RX ADMIN — ACETAMINOPHEN 650 MG: 325 TABLET, FILM COATED ORAL at 08:01

## 2021-04-24 RX ADMIN — METHOCARBAMOL 500 MG: 500 TABLET, FILM COATED ORAL at 04:05

## 2021-04-24 ASSESSMENT — ACTIVITIES OF DAILY LIVING (ADL)
ADLS_ACUITY_SCORE: 12

## 2021-04-24 ASSESSMENT — MIFFLIN-ST. JEOR: SCORE: 1786.01

## 2021-04-24 NOTE — PLAN OF CARE
D: Patient admitted 2/8 for worsening functional status and renal function concerning for worsening HF. Now listed status 2E for heart txp. Hx. NICM, VT s/p CRT-D, s/p LVAD HM 2 6/2017 as DT (however w/recent weight loss now candidate for transplant), COPD, CKD, DM2.  I/A: A&Ox4. VSSA (do not disturb orders overnight). On RA/Cpap at HS. SR (1st degree AVB) 60s-70s. LVAD #s wnl, no alarms. C/o BL wrist pain partially relieved prn tylenol, robaxin and tramadol; Pt requesting adjunct to current pain regimen and reports that one time doses of oxycodone seemed to reduce pain for ~8 hours at a time. Adequate uop. Up ad francisco. Appeared to rest comfortably overnight.  P: Continue to monitor and notify Cards 2 with questions/concerns.

## 2021-04-24 NOTE — PROGRESS NOTES
Orthopedic surgery progress note     Orthopedic surgery was paged today regarding the patient's bilateral numbness, paresthesias, and pain in his median nerve distribution bilaterally status post bilateral carpal tunnel release with Dr. Brand of plastic surgery on February 18, 2021.  Recommendations were to reach out to Dr. Brand regarding further evaluation and treatment.    This patient was not seen or evaluated by orthopedic surgery today.    Please don't hesitate to consult orthopedic surgery if additional evaluation and treatment is needed during the patient's hospital stay.     Tiffany Alexander MD   Orthopedic Surgery, PGY-1

## 2021-04-24 NOTE — PLAN OF CARE
"Patient hand and wrist pain \"8/10\" (bilateral) continues; C2 notified. One-time 5 mg oxy oral given at 0800 with relief to \"5/10\". Plastic surgery consult in place to formulate a pain relief plan:   - Recommend hand therapy for nerve gliding & desensitization (ordered)  - Continue gabapentin or consider increasing dose if medically appropriate; could also add amitriptyline to help w/ nerve pain  - Can continue w/ lidocaine gel as this seems help  - Plastics will follow    Patient staus 2E for heart transplant. Alert and oriented, VSS, VAD numbers WDL. VAD alarms checked and in place, dressing changed. Sinus rhythm with frequent PAC's, PVC's. HR 60's - 70's. Afebrile. New SL PIV R FA. Continuing to monitor.  "

## 2021-04-24 NOTE — PROGRESS NOTES
[facility]   Cardiology II Service / Advanced Heart Failure  Daily Progress Note  Date of Service: 4/24/2021      Patient: Jim Willingham  MRN: [id number]  Admission Date: 2/8/2021  Hospital Day # 75    Assessment and Plan: Jim Willingham is a 65 year old male with history of NICM EF 20% c/b VT s/p CRT-D s/p HMII LVAD 6/19/2017 originally intended as destination therapy 2/2 obesity however with recent weight loss now candidate for transplantation. He is admitted for worsening functional status and renal function concerning for worsening heart failure. He is now listed status 2E for transplant.     Chronic systolic heart failure secondary to NICM s/p HM II LVAD. Echo 1/8/21 at 9400 rpm, EF<30%m LVIDd 6.8cm, at least mildly reduced RV function, AoV closed without AI, mild-mod eccentric MR, dilated IVC without collapse. RHC 3/23 RA 10 mPA 25 mPCWP 14 Kee CO/CI 6.5/3.1.  Stage D, NYHA Class III  ACEi/ARB contraindicated due to renal dysfunction. Isordil 10 mg po TID. Hydralazine increased to 75 mg po TID.   BB Contraindicated due to low cardiac output.   Aldosterone antagonist contraindicated due to renal dysfunction  SCD prophylaxis CRT-D  Fluid status: Near euvolemic state   MAP: 82  LDH: 319 4/23  Anticoagulation: Coumadin per pharmacy. INR- 2.54, goal 2.5-3, goal increased in setting of power spikes and elevated flows this hospitalization.    Antiplatelet: ASA 81 mg po daily  - remains on the cardiac transplant list status 2E. Persistent escalation of diuretics in setting of progressive heart failure with refractory hypervolemia.      The patient's HeartMate LVAD was interrogated 4/24/2021  * Speed 9600 rpm   * Pulsatility index 4.3  * Power 6.8 Manuel   * Flow 6.5 L/minute   Fluid status: Near euvolemic state   Alarms were reviewed, and notable for PI events. No evidence of low voltage alarms.   All external components were inspected and showed no evidence of damage or malfunction.     WALTER on CKD  Stage III. Cr peaked at 2.22.  - Cr- 1.77 (1.69).      History of Afib. History of VT/VF. Asymptomatic bursts of questionable AF vs NSVT without hemodynamic compromise. RHC 3/23 RA 10 mPA 25 mPCWP 14 Kee CO/CI 6.5/3.1. CHADSVASC-5.   - Continue Amiodarone 200 mg po daily.   - Coumadin as above.      DM type II, controlled. Symptomatic Hypoglycemia, resolved. Low Cortisol: Hgb A1C-6.2. Lantus and Novololg sliding scale insulin discontinued. Endo consult appreciated. C-peptide 13.9 an Proinsulin 18.4. Serum cortisol 7.7 3/6/21, cosyntropin stim test WNL per Endo.   -Per endo --> Fingerstick glucose might not be accurate for hypoglycemia, if patient has glucose <55 (without insulin), plasma glucose should be sent for confirmation of hypoglycemia prior to correction. While the patient is off insulin therapy, POC glucose readings above 60 are acceptable for the patient  - postprandial hypoglycemia this AM. Endo notified, continue Acarbose 25 mg po daily. If recurrent episode pt amenable to increasing Acarbose to 50 mg po daily.      Carpel tunnel, bilateral s/p bilateral release. Evidence of thenar atrophy. Relief with steroid injection 11/20. Carpal tunnel release 2/18/21. Appreciate Ortho/Plastics consult, signed off. Continue Tylenol 650 mg po QID and Gabapentin 300 mg in AM, 600 mg in the afternoon, and 600 mg in the evening.   - Lidocaine gel prn.   - Case discussed with Ortho deferred to plastics. Appreciate Plastic surgery evaluation today  - Tramadol prn, Oxycodone times one this AM.      Chronic/resolved conditions  COPD/Asthma: pta Breo Ellipta, albuterol prn.   Depression: pta Bupropion  Right sided weakness, resolved. CT head negative for acute findings. Appreciate Neuro eval.   Staph Hominis Bacteremia. No need for surveillance blood cx per transplant ID.    Rhinovirus, resolved. Bacterial bronchitis. Completed 5 day course of Cefdinir. COVID negative 2/8. Resp PCR +rhinovirus. CXR 2/12 with no overt  "PNA. Stopped cefdinir 2/15. Cleared for transplant per ID. Repeat RVP negative 3/5. COVID repeated due to sinus congestion, negative. Congestion improved with transition from Claritin to Zyrtec.      FEN: 3 gram sodium diet   PROPHY:  Coumadin  LINES:  PIV   DISPO:  TBD pending cardiac transplant.   CODE STATUS: Full Code   ================================================================    Interval History/ROS: He complains of severe bilateral burning pain to palms and 1-3 digits. He denies fever, chills, chest pain, palpitations, cough, nausea, vomiting, diarrhea, melena, and hematochezia. He notes improvement in RESENDIZ and LE edema. He is tolerating oral intake and ambulation.     Last 24 hr care team notes reviewed.   ROS:  4 point ROS including Respiratory, CV, GI and , other than that noted in the HPI, is negative.     Medications: Reviewed in EPIC.     Physical Exam:   BP (!) 86/66 (BP Location: Left arm)   Pulse 66   Temp 98  F (36.7  C) (Oral)   Resp 18   Ht 1.727 m (5' 8\")   Wt 101.7 kg (224 lb 1.6 oz)   SpO2 97%   BMI 34.07 kg/m    GENERAL: Appears alert and oriented times three.   HEENT: Eye symmetrical and free of discharge bilaterally. Mucous membranes moist and without lesions.  NECK: Supple and without lymphadenopathy. JVD 10 cm.   CV: RRR, S1S2 present with LVAD hum.  RESPIRATORY: Respirations regular, even, and unlabored. Lungs CTA throughout.   GI: Soft and non distended with normoactive bowel sounds present in all quadrants. No tenderness, rebound, guarding. No organomegaly.   EXTREMITIES: +1 bilateral LE peripheral edema. 2+ bilateral pedal pulses.   NEUROLOGIC: Alert and orientated x 3. CN II-XII grossly intact. No focal deficits.  MUSCULOSKELETAL: Mild UE edema, improved from yesterday without erythematous. Tenderness to palpation of palms and 1-3 digits bilaterally.  Flexion of right hand intact with decreased flexion to left palm due to pain.   SKIN: No jaundice. No rashes or lesions. " LVAD drive line covered.     Data:  CMP  Recent Labs   Lab 04/24/21  0732 04/23/21  1739 04/23/21  0635 04/22/21  1406 04/22/21  0527 04/21/21  0626 04/19/21  0607 04/19/21  0607    134 132*  --  136 135   < > 134   POTASSIUM 3.8 3.8 3.8 3.9 3.4 3.6   < > 3.9   CHLORIDE 98 100 101  --  102 102   < > 102   CO2 26 29 25  --  27 25   < > 25   ANIONGAP 10 6 6  --  7 8   < > 7   GLC 92 111* 83  --  92 101*   < > 90   BUN 50* 50* 52*  --  51* 49*   < > 46*   CR 1.77* 1.69* 1.74*  --  1.81* 1.72*   < > 1.72*   GFRESTIMATED 40* 42* 41*  --  39* 41*   < > 41*   GFRESTBLACK 46* 49* 47*  --  45* 48*   < > 48*   QAMAR 8.9 9.0 8.6  --  8.8 8.8   < > 8.8   MAG 2.6*  --  2.7*  --  2.5* 2.6*   < > 2.6*   PROTTOTAL  --   --   --   --   --   --   --  6.7*   ALBUMIN  --   --   --   --   --   --   --  3.6   BILITOTAL  --   --   --   --   --   --   --  0.8   ALKPHOS  --   --   --   --   --   --   --  102   AST  --   --   --   --   --   --   --  34   ALT  --   --   --   --   --   --   --  36    < > = values in this interval not displayed.     CBC  Recent Labs   Lab 04/22/21  0527 04/19/21  0607   WBC 4.2 4.5   RBC 3.90* 3.99*   HGB 11.9* 12.2*   HCT 37.2* 38.0*   MCV 95 95   MCH 30.5 30.6   MCHC 32.0 32.1   RDW 15.4* 15.2*    175     INR  Recent Labs   Lab 04/24/21  0732 04/23/21  0635 04/22/21  0527 04/21/21  0626   INR 2.54* 2.79* 2.90* 2.81*       Patient discussed with Dr. Nguyen.      Soraya Marshall P  4/24/2021

## 2021-04-24 NOTE — PROGRESS NOTES
Plastic & Hand Surgery Note     Subjective:  Plastics contacted d/t burning sensation in bilateral thumb/index/long fingers & palm, s/p b/l CTR 2/18 of this year.  At bedside, pt states that he has continued to have tingling in median n distribution since his surgery, but 1.5 days ago he started having intense burning pain, especially at fingertips. States that lidocaine gel has somewhat helped, only to a certain extent. Did wake up last night from intensity of burning pain. Also notes increased sensitivity at fingertips. Denies any Sx in other areas of his hand.    Objective:  Temp:  [97.8  F (36.6  C)-98.2  F (36.8  C)] 98  F (36.7  C)  Pulse:  [66-72] 66  Resp:  [16-18] 18  BP: ()/(59-84) 86/66  SpO2:  [95 %-98 %] 97 %  I/O last 3 completed shifts:  In: 1610 [P.O.:1610]  Out: 4605 [Urine:4605]    Gen: NAD  Resp: NLB  Bilateral hands w/ well-healed CTR incision, no swelling throughout, no erythema or concern for infxn; able to make full fist & extend fingers, full ROM of wrists; palm & fingers mildly TTP, increased sensitivity at pulp of thumb/index/long, sharp sensation intact throughout all nerve distributions though slightly less sharp in median distribution    A/P: Jim Willingham is a 65 year old male PMH significant for heart failure, CKD, T2DM, s/p b/l CTR 2/18/21, uncomplicated, now p/w burning pain & increased sensitivity to median n fingers. Exam reassuring, no surgical intervention at this time.  - Recommend hand therapy for nerve gliding & desensitization (ordered)  - Continue gabapentin or consider increasing dose if medically appropriate; could also add amitriptyline to help w/ nerve pain  - Can continue w/ lidocaine gel as this seems help  - Plastics will follow      Cate Hathaway MD  Plastic Surgery PGY4

## 2021-04-25 ENCOUNTER — APPOINTMENT (OUTPATIENT)
Dept: OCCUPATIONAL THERAPY | Facility: CLINIC | Age: 66
DRG: 001 | End: 2021-04-25
Attending: INTERNAL MEDICINE
Payer: COMMERCIAL

## 2021-04-25 LAB
ANION GAP SERPL CALCULATED.3IONS-SCNC: 9 MMOL/L (ref 3–14)
BUN SERPL-MCNC: 56 MG/DL (ref 7–30)
CALCIUM SERPL-MCNC: 8.6 MG/DL (ref 8.5–10.1)
CHLORIDE SERPL-SCNC: 101 MMOL/L (ref 94–109)
CO2 SERPL-SCNC: 27 MMOL/L (ref 20–32)
CREAT SERPL-MCNC: 1.66 MG/DL (ref 0.66–1.25)
GFR SERPL CREATININE-BSD FRML MDRD: 43 ML/MIN/{1.73_M2}
GLUCOSE BLDC GLUCOMTR-MCNC: 100 MG/DL (ref 70–99)
GLUCOSE BLDC GLUCOMTR-MCNC: 104 MG/DL (ref 70–99)
GLUCOSE BLDC GLUCOMTR-MCNC: 180 MG/DL (ref 70–99)
GLUCOSE BLDC GLUCOMTR-MCNC: 87 MG/DL (ref 70–99)
GLUCOSE BLDC GLUCOMTR-MCNC: 93 MG/DL (ref 70–99)
GLUCOSE SERPL-MCNC: 97 MG/DL (ref 70–99)
INR PPP: 2.59 (ref 0.86–1.14)
MAGNESIUM SERPL-MCNC: 2.5 MG/DL (ref 1.6–2.3)
POTASSIUM SERPL-SCNC: 4 MMOL/L (ref 3.4–5.3)
SODIUM SERPL-SCNC: 138 MMOL/L (ref 133–144)

## 2021-04-25 PROCEDURE — 80048 BASIC METABOLIC PNL TOTAL CA: CPT | Performed by: STUDENT IN AN ORGANIZED HEALTH CARE EDUCATION/TRAINING PROGRAM

## 2021-04-25 PROCEDURE — 85610 PROTHROMBIN TIME: CPT | Performed by: STUDENT IN AN ORGANIZED HEALTH CARE EDUCATION/TRAINING PROGRAM

## 2021-04-25 PROCEDURE — 36415 COLL VENOUS BLD VENIPUNCTURE: CPT | Performed by: STUDENT IN AN ORGANIZED HEALTH CARE EDUCATION/TRAINING PROGRAM

## 2021-04-25 PROCEDURE — 250N000013 HC RX MED GY IP 250 OP 250 PS 637: Performed by: STUDENT IN AN ORGANIZED HEALTH CARE EDUCATION/TRAINING PROGRAM

## 2021-04-25 PROCEDURE — 214N000001 HC R&B CCU UMMC

## 2021-04-25 PROCEDURE — 97760 ORTHOTIC MGMT&TRAING 1ST ENC: CPT | Mod: GO

## 2021-04-25 PROCEDURE — 97110 THERAPEUTIC EXERCISES: CPT | Mod: GO

## 2021-04-25 PROCEDURE — 83735 ASSAY OF MAGNESIUM: CPT | Performed by: STUDENT IN AN ORGANIZED HEALTH CARE EDUCATION/TRAINING PROGRAM

## 2021-04-25 PROCEDURE — 999N001017 HC STATISTIC GLUCOSE BY METER IP

## 2021-04-25 PROCEDURE — 250N000013 HC RX MED GY IP 250 OP 250 PS 637: Performed by: INTERNAL MEDICINE

## 2021-04-25 PROCEDURE — 250N000013 HC RX MED GY IP 250 OP 250 PS 637: Performed by: NURSE PRACTITIONER

## 2021-04-25 PROCEDURE — 99232 SBSQ HOSP IP/OBS MODERATE 35: CPT | Performed by: NURSE PRACTITIONER

## 2021-04-25 PROCEDURE — 250N000009 HC RX 250: Performed by: NURSE PRACTITIONER

## 2021-04-25 PROCEDURE — 250N000013 HC RX MED GY IP 250 OP 250 PS 637: Performed by: PHYSICIAN ASSISTANT

## 2021-04-25 RX ORDER — OXYCODONE HYDROCHLORIDE 5 MG/1
5 TABLET ORAL 2 TIMES DAILY PRN
Status: COMPLETED | OUTPATIENT
Start: 2021-04-25 | End: 2021-04-27

## 2021-04-25 RX ADMIN — HYDRALAZINE HYDROCHLORIDE 75 MG: 50 TABLET ORAL at 20:05

## 2021-04-25 RX ADMIN — HYDRALAZINE HYDROCHLORIDE 75 MG: 50 TABLET ORAL at 13:55

## 2021-04-25 RX ADMIN — METHOCARBAMOL 500 MG: 500 TABLET, FILM COATED ORAL at 09:12

## 2021-04-25 RX ADMIN — BUPROPION HYDROCHLORIDE 100 MG: 100 TABLET, EXTENDED RELEASE ORAL at 20:06

## 2021-04-25 RX ADMIN — GABAPENTIN 600 MG: 300 CAPSULE ORAL at 13:55

## 2021-04-25 RX ADMIN — Medication 10 MG: at 21:53

## 2021-04-25 RX ADMIN — ACETAMINOPHEN 650 MG: 325 TABLET, FILM COATED ORAL at 20:05

## 2021-04-25 RX ADMIN — MONTELUKAST 10 MG: 10 TABLET, FILM COATED ORAL at 21:53

## 2021-04-25 RX ADMIN — ACETAMINOPHEN 650 MG: 325 TABLET, FILM COATED ORAL at 09:12

## 2021-04-25 RX ADMIN — AMITRIPTYLINE HYDROCHLORIDE 25 MG: 25 TABLET, FILM COATED ORAL at 21:53

## 2021-04-25 RX ADMIN — Medication 50 MG: at 16:53

## 2021-04-25 RX ADMIN — WARFARIN SODIUM 9 MG: 4 TABLET ORAL at 18:05

## 2021-04-25 RX ADMIN — OXYCODONE HYDROCHLORIDE 5 MG: 5 TABLET ORAL at 16:53

## 2021-04-25 RX ADMIN — GABAPENTIN 600 MG: 300 CAPSULE ORAL at 21:53

## 2021-04-25 RX ADMIN — GABAPENTIN 300 MG: 300 CAPSULE ORAL at 08:37

## 2021-04-25 RX ADMIN — ACETAMINOPHEN 650 MG: 325 TABLET, FILM COATED ORAL at 13:55

## 2021-04-25 RX ADMIN — CETIRIZINE HYDROCHLORIDE 10 MG: 10 TABLET, FILM COATED ORAL at 08:37

## 2021-04-25 RX ADMIN — ISOSORBIDE DINITRATE 10 MG: 10 TABLET ORAL at 08:37

## 2021-04-25 RX ADMIN — ISOSORBIDE DINITRATE 10 MG: 10 TABLET ORAL at 13:55

## 2021-04-25 RX ADMIN — POTASSIUM CHLORIDE 40 MEQ: 750 TABLET, EXTENDED RELEASE ORAL at 20:06

## 2021-04-25 RX ADMIN — ACARBOSE 25 MG: 25 TABLET ORAL at 06:40

## 2021-04-25 RX ADMIN — ISOSORBIDE DINITRATE 10 MG: 10 TABLET ORAL at 20:05

## 2021-04-25 RX ADMIN — BUMETANIDE 4 MG: 2 TABLET ORAL at 08:36

## 2021-04-25 RX ADMIN — ASPIRIN 81 MG CHEWABLE TABLET 81 MG: 81 TABLET CHEWABLE at 08:37

## 2021-04-25 RX ADMIN — ANAKINRA 100 MG: 100 INJECTION, SOLUTION SUBCUTANEOUS at 08:36

## 2021-04-25 RX ADMIN — BUMETANIDE 4 MG: 2 TABLET ORAL at 16:54

## 2021-04-25 RX ADMIN — FLUTICASONE FUROATE AND VILANTEROL TRIFENATATE 1 PUFF: 100; 25 POWDER RESPIRATORY (INHALATION) at 08:35

## 2021-04-25 RX ADMIN — UMECLIDINIUM 1 PUFF: 62.5 AEROSOL, POWDER ORAL at 08:36

## 2021-04-25 RX ADMIN — ALLOPURINOL 300 MG: 300 TABLET ORAL at 08:37

## 2021-04-25 RX ADMIN — METHOCARBAMOL 500 MG: 500 TABLET, FILM COATED ORAL at 20:05

## 2021-04-25 RX ADMIN — BUPROPION HYDROCHLORIDE 100 MG: 100 TABLET, EXTENDED RELEASE ORAL at 08:37

## 2021-04-25 RX ADMIN — HYDRALAZINE HYDROCHLORIDE 75 MG: 50 TABLET ORAL at 08:37

## 2021-04-25 RX ADMIN — AMIODARONE HYDROCHLORIDE 200 MG: 200 TABLET ORAL at 08:37

## 2021-04-25 RX ADMIN — DOCUSATE SODIUM 50 MG AND SENNOSIDES 8.6 MG 2 TABLET: 8.6; 5 TABLET, FILM COATED ORAL at 20:06

## 2021-04-25 RX ADMIN — POTASSIUM CHLORIDE 40 MEQ: 750 TABLET, EXTENDED RELEASE ORAL at 08:36

## 2021-04-25 RX ADMIN — Medication 50 MG: at 09:12

## 2021-04-25 ASSESSMENT — MIFFLIN-ST. JEOR: SCORE: 1793.27

## 2021-04-25 ASSESSMENT — ACTIVITIES OF DAILY LIVING (ADL)
ADLS_ACUITY_SCORE: 12

## 2021-04-25 NOTE — PROGRESS NOTES
[facility]   Cardiology II Service / Advanced Heart Failure  Daily Progress Note  Date of Service: 4/25/2021      Patient: Jim Willingham  MRN: [id number]  Admission Date: 2/8/2021  Hospital Day # 76    Assessment and Plan: Jim Willingham is a 65 year old male with history of NICM EF 20% c/b VT s/p CRT-D s/p HMII LVAD 6/19/2017 originally intended as destination therapy 2/2 obesity however with recent weight loss now candidate for transplantation. He is admitted for worsening functional status and renal function concerning for worsening heart failure. He is now listed status 2E for transplant.     Chronic systolic heart failure secondary to NICM s/p HM II LVAD. Echo 1/8/21 at 9400 rpm, EF<30%m LVIDd 6.8cm, at least mildly reduced RV function, AoV closed without AI, mild-mod eccentric MR, dilated IVC without collapse. RHC 3/23 RA 10 mPA 25 mPCWP 14 Kee CO/CI 6.5/3.1.  Stage D, NYHA Class III  ACEi/ARB contraindicated due to renal dysfunction. Isordil 10 mg po TID. Hydralazine 75 mg po TID.   BB Contraindicated due to low cardiac output.   Aldosterone antagonist contraindicated due to renal dysfunction  SCD prophylaxis CRT-D  Fluid status: Near euvolemic state   MAP: 68  LDH: 319 4/23  Anticoagulation: Coumadin per pharmacy. INR- 2.59, goal 2.5-3, goal increased in setting of power spikes and elevated flows this hospitalization.    Antiplatelet: ASA 81 mg po daily  - remains on the cardiac transplant list status 2E. Persistent escalation of diuretics in setting of progressive heart failure with refractory hypervolemia.      The patient's HeartMate LVAD was interrogated 4/25/2021  * Speed 9600 rpm   * Pulsatility index 6.1  * Power 6.2 Manuel   * Flow 5.3 L/minute   Fluid status: Near euvolemic state   Alarms were reviewed, and notable for PI events. No evidence of low voltage alarms.   All external components were inspected and showed no evidence of damage or malfunction.     WALTER on CKD Stage III. Cr  peaked at 2.22.  - Cr- 1.66 (1.77).      History of Afib. History of VT/VF. Asymptomatic bursts of questionable AF vs NSVT without hemodynamic compromise. RHC 3/23 RA 10 mPA 25 mPCWP 14 Kee CO/CI 6.5/3.1. CHADSVASC-5.   - Continue Amiodarone 200 mg po daily.   - Coumadin as above.      DM type II, controlled. Symptomatic Hypoglycemia, resolved. Low Cortisol: Hgb A1C-6.2. Lantus and Novololg sliding scale insulin discontinued. Endo consult appreciated. C-peptide 13.9 an Proinsulin 18.4. Serum cortisol 7.7 3/6/21, cosyntropin stim test WNL per Endo.   -Per endo --> Fingerstick glucose might not be accurate for hypoglycemia, if patient has glucose <55 (without insulin), plasma glucose should be sent for confirmation of hypoglycemia prior to correction. While the patient is off insulin therapy, POC glucose readings above 60 are acceptable for the patient  - postprandial hypoglycemia this AM. Endo notified, continue Acarbose 25 mg po daily. If recurrent episode pt amenable to increasing Acarbose to 50 mg po daily.      Carpel tunnel, bilateral s/p bilateral release. Evidence of thenar atrophy. Relief with steroid injection 11/20. Carpal tunnel release 2/18/21. Appreciate Ortho/Plastics consult, signed off. Continue Tylenol 650 mg po QID and Gabapentin 300 mg in AM, 600 mg in the afternoon, and 600 mg in the evening.   - Lidocaine gel prn.   - Appreciate Plastic surgery consult.   - Tramadol prn, Oxycodone BID prn for 4 doses.   - Elavil 25 mg po at HS, discontinue Trazodone. 's in the past.   - Hand braces and therapy consult.      Chronic/resolved conditions  COPD/Asthma: pta Breo Ellipta, albuterol prn.   Depression: pta Bupropion  Right sided weakness, resolved. CT head negative for acute findings. Appreciate Neuro eval.   Staph Hominis Bacteremia. No need for surveillance blood cx per transplant ID.    Rhinovirus, resolved. Bacterial bronchitis. Completed 5 day course of Cefdinir. COVID negative 2/8. Resp PCR  "+rhinovirus. CXR 2/12 with no overt PNA. Stopped cefdinir 2/15. Cleared for transplant per ID. Repeat RVP negative 3/5. COVID repeated due to sinus congestion, negative. Congestion improved with transition from Claritin to Zyrtec.      FEN: 3 gram sodium diet   PROPHY:  Coumadin  LINES:  PIV   DISPO:  TBD pending cardiac transplant.   CODE STATUS: Full Code   ================================================================    Interval History/ROS: He notes improvement to hand pain with braces last HS. He denies fever, chills, chest pain, palpitations, RESENDIZ, cough, nausea, vomiting, diarrhea, melena, hematochezia, and concerns at his drive line site. His LE is mildly improved today.     Last 24 hr care team notes reviewed.   ROS:  4 point ROS including Respiratory, CV, GI and , other than that noted in the HPI, is negative.     Medications: Reviewed in EPIC.     Physical Exam:   /54 (BP Location: Left arm)   Pulse 63   Temp 97  F (36.1  C) (Axillary)   Resp 15   Ht 1.727 m (5' 8\")   Wt 102.4 kg (225 lb 11.2 oz)   SpO2 96%   BMI 34.32 kg/m    GENERAL: Appears alert and oriented times three.   HEENT: Eye symmetrical and free of discharge bilaterally. Mucous membranes moist and without lesions.  NECK: Supple and without lymphadenopathy. JVD at clavicular line.   CV: RRR, S1S2 present with LVAD hum.   RESPIRATORY: Respirations regular, even, and unlabored. Lungs CTA throughout.   GI: Soft and non distended with normoactive bowel sounds present in all quadrants. No tenderness, rebound, guarding. No organomegaly.   EXTREMITIES: Trace bilateral LE peripheral edema. 2+ bilateral pedal pulses.   NEUROLOGIC: Alert and orientated x 3. CN II-XII grossly intact. No focal deficits.   MUSCULOSKELETAL: No joint swelling or tenderness.   SKIN: No jaundice. No rashes or lesions. LVAD drive line covered.     Data:  CMP  Recent Labs   Lab 04/25/21  0632 04/24/21  0732 04/23/21  1739 04/23/21  0635 04/22/21  0527 " 04/22/21 0527 04/19/21  0607 04/19/21  0607    134 134 132*  --  136   < > 134   POTASSIUM 4.0 3.8 3.8 3.8   < > 3.4   < > 3.9   CHLORIDE 101 98 100 101  --  102   < > 102   CO2 27 26 29 25  --  27   < > 25   ANIONGAP 9 10 6 6  --  7   < > 7   GLC 97 92 111* 83  --  92   < > 90   BUN 56* 50* 50* 52*  --  51*   < > 46*   CR 1.66* 1.77* 1.69* 1.74*  --  1.81*   < > 1.72*   GFRESTIMATED 43* 40* 42* 41*  --  39*   < > 41*   GFRESTBLACK 50* 46* 49* 47*  --  45*   < > 48*   QAMAR 8.6 8.9 9.0 8.6  --  8.8   < > 8.8   MAG 2.5* 2.6*  --  2.7*  --  2.5*   < > 2.6*   PROTTOTAL  --   --   --   --   --   --   --  6.7*   ALBUMIN  --   --   --   --   --   --   --  3.6   BILITOTAL  --   --   --   --   --   --   --  0.8   ALKPHOS  --   --   --   --   --   --   --  102   AST  --   --   --   --   --   --   --  34   ALT  --   --   --   --   --   --   --  36    < > = values in this interval not displayed.     CBC  Recent Labs   Lab 04/22/21 0527 04/19/21 0607   WBC 4.2 4.5   RBC 3.90* 3.99*   HGB 11.9* 12.2*   HCT 37.2* 38.0*   MCV 95 95   MCH 30.5 30.6   MCHC 32.0 32.1   RDW 15.4* 15.2*    175     INR  Recent Labs   Lab 04/25/21  0632 04/24/21  0732 04/23/21  0635 04/22/21  0527   INR 2.59* 2.54* 2.79* 2.90*       Patient discussed with Dr. Nguyen.      Soraya Marshall [facility]  4/25/2021

## 2021-04-25 NOTE — PLAN OF CARE
Dx: Pt is admitted  on 2/8/21 for worsening HF and renal function  He is now listed status 2E for transplant.   Neuro: alox4  Cardiac: SR. LVA # WDL  Respiratory: LSC. On RA.   GI/: Good appetite. Pt had BM today. Pt c/o gas discomfort after eating dinner. Simethicone was given and pt had a good relief. Hypoglycemic event at HS. BS=66/asymptomatic. BG corrected with apple juice. Meal given at HS to prevent a drop in BS ovenight.   : Good UO. Keeping a strict I and O's.   Activity: up ad francisco in room  Pain: c/o BL hand pain. Spoke to C2 resident. MD ordered to try splint instead of additional oxycodone at the beginning of the shift .Pt wearing BL hand splint/will continue to monitor.   Skin: WDL  LDA's: VAD drive line. PIV-SL  Plan: Continue to monitor. Awaiting heart transplant.     Time: 2200  Notified C2;  on call  Reason: bilateral arm discomfort. Pt is wearing his splint and reported a little change in pain and still very uncomfortable. MD ordered a one time Oxycodone.   Also, informed MD about hypoglycemia/no new orders.

## 2021-04-25 NOTE — PLAN OF CARE
Patient staus 2E for heart transplant. Wrist splints in place alleviating bilateral hand / wrist pain. OT consult on shift for hand and wrist exercise and therapy. Alert and oriented, VSS, VAD numbers WDL. VAD alarms checked and in place, dressing changed. Sinus rhythm with frequent PAC's HR 60's - 70's. Afebrile. Continuing to monitor.

## 2021-04-25 NOTE — PLAN OF CARE
D: Patient admitted 2/8 for worsening functional status and renal function concerning for worsening HF. Now listed status 2E for heart txp. Hx. NICM, VT s/p CRT-D, s/p LVAD HM 2 6/2017 as DT (however w/recent weight loss now candidate for transplant), COPD, CKD, DM2.  I/A: A&Ox4. VSSA (do not disturb orders overnight). On RA/Cpap at HS. SR (1st degree AVB) 60s-70s w/occasional PVCs. LVAD #s wnl, no alarms. C/o BL wrist pain partially relieved by bilateral wrist braces and rest, no pain meds given overnight.  overnight. Adequate uop. Up ad francisco. Appeared to rest comfortably overnight. Patient requesting to rest longer this AM in bed, please weigh when first out of bed this AM.   P: Continue to monitor and notify Cards 2 with questions/concerns.

## 2021-04-26 LAB
ALBUMIN SERPL-MCNC: 3.4 G/DL (ref 3.4–5)
ALP SERPL-CCNC: 108 U/L (ref 40–150)
ALT SERPL W P-5'-P-CCNC: 30 U/L (ref 0–70)
ANION GAP SERPL CALCULATED.3IONS-SCNC: 8 MMOL/L (ref 3–14)
AST SERPL W P-5'-P-CCNC: 32 U/L (ref 0–45)
BILIRUB DIRECT SERPL-MCNC: 0.2 MG/DL (ref 0–0.2)
BILIRUB SERPL-MCNC: 0.7 MG/DL (ref 0.2–1.3)
BUN SERPL-MCNC: 54 MG/DL (ref 7–30)
CALCIUM SERPL-MCNC: 8.6 MG/DL (ref 8.5–10.1)
CHLORIDE SERPL-SCNC: 101 MMOL/L (ref 94–109)
CO2 SERPL-SCNC: 27 MMOL/L (ref 20–32)
CREAT SERPL-MCNC: 1.64 MG/DL (ref 0.66–1.25)
ERYTHROCYTE [DISTWIDTH] IN BLOOD BY AUTOMATED COUNT: 14.9 % (ref 10–15)
GFR SERPL CREATININE-BSD FRML MDRD: 44 ML/MIN/{1.73_M2}
GLUCOSE BLDC GLUCOMTR-MCNC: 110 MG/DL (ref 70–99)
GLUCOSE BLDC GLUCOMTR-MCNC: 128 MG/DL (ref 70–99)
GLUCOSE BLDC GLUCOMTR-MCNC: 159 MG/DL (ref 70–99)
GLUCOSE BLDC GLUCOMTR-MCNC: 74 MG/DL (ref 70–99)
GLUCOSE BLDC GLUCOMTR-MCNC: 99 MG/DL (ref 70–99)
GLUCOSE SERPL-MCNC: 96 MG/DL (ref 70–99)
HCT VFR BLD AUTO: 35.6 % (ref 40–53)
HGB BLD-MCNC: 11.1 G/DL (ref 13.3–17.7)
INR PPP: 2.67 (ref 0.86–1.14)
MAGNESIUM SERPL-MCNC: 2.5 MG/DL (ref 1.6–2.3)
MCH RBC QN AUTO: 29.2 PG (ref 26.5–33)
MCHC RBC AUTO-ENTMCNC: 31.2 G/DL (ref 31.5–36.5)
MCV RBC AUTO: 94 FL (ref 78–100)
PLATELET # BLD AUTO: 171 10E9/L (ref 150–450)
POTASSIUM SERPL-SCNC: 3.5 MMOL/L (ref 3.4–5.3)
PROT SERPL-MCNC: 6.4 G/DL (ref 6.8–8.8)
RBC # BLD AUTO: 3.8 10E12/L (ref 4.4–5.9)
SODIUM SERPL-SCNC: 135 MMOL/L (ref 133–144)
WBC # BLD AUTO: 3.8 10E9/L (ref 4–11)

## 2021-04-26 PROCEDURE — 250N000013 HC RX MED GY IP 250 OP 250 PS 637: Performed by: NURSE PRACTITIONER

## 2021-04-26 PROCEDURE — 250N000013 HC RX MED GY IP 250 OP 250 PS 637: Performed by: STUDENT IN AN ORGANIZED HEALTH CARE EDUCATION/TRAINING PROGRAM

## 2021-04-26 PROCEDURE — 36415 COLL VENOUS BLD VENIPUNCTURE: CPT | Performed by: STUDENT IN AN ORGANIZED HEALTH CARE EDUCATION/TRAINING PROGRAM

## 2021-04-26 PROCEDURE — 250N000009 HC RX 250: Performed by: NURSE PRACTITIONER

## 2021-04-26 PROCEDURE — 80076 HEPATIC FUNCTION PANEL: CPT | Performed by: STUDENT IN AN ORGANIZED HEALTH CARE EDUCATION/TRAINING PROGRAM

## 2021-04-26 PROCEDURE — 85610 PROTHROMBIN TIME: CPT | Performed by: STUDENT IN AN ORGANIZED HEALTH CARE EDUCATION/TRAINING PROGRAM

## 2021-04-26 PROCEDURE — 250N000013 HC RX MED GY IP 250 OP 250 PS 637: Performed by: PHYSICIAN ASSISTANT

## 2021-04-26 PROCEDURE — 214N000001 HC R&B CCU UMMC

## 2021-04-26 PROCEDURE — 83735 ASSAY OF MAGNESIUM: CPT | Performed by: STUDENT IN AN ORGANIZED HEALTH CARE EDUCATION/TRAINING PROGRAM

## 2021-04-26 PROCEDURE — 99232 SBSQ HOSP IP/OBS MODERATE 35: CPT | Mod: 25 | Performed by: NURSE PRACTITIONER

## 2021-04-26 PROCEDURE — 250N000013 HC RX MED GY IP 250 OP 250 PS 637: Performed by: INTERNAL MEDICINE

## 2021-04-26 PROCEDURE — 999N001017 HC STATISTIC GLUCOSE BY METER IP

## 2021-04-26 PROCEDURE — 93750 INTERROGATION VAD IN PERSON: CPT | Performed by: NURSE PRACTITIONER

## 2021-04-26 PROCEDURE — 84132 ASSAY OF SERUM POTASSIUM: CPT | Performed by: STUDENT IN AN ORGANIZED HEALTH CARE EDUCATION/TRAINING PROGRAM

## 2021-04-26 PROCEDURE — 85027 COMPLETE CBC AUTOMATED: CPT | Performed by: STUDENT IN AN ORGANIZED HEALTH CARE EDUCATION/TRAINING PROGRAM

## 2021-04-26 PROCEDURE — 80048 BASIC METABOLIC PNL TOTAL CA: CPT | Performed by: STUDENT IN AN ORGANIZED HEALTH CARE EDUCATION/TRAINING PROGRAM

## 2021-04-26 PROCEDURE — 80076 HEPATIC FUNCTION PANEL: CPT | Performed by: NURSE PRACTITIONER

## 2021-04-26 RX ORDER — POTASSIUM CHLORIDE 750 MG/1
20 TABLET, EXTENDED RELEASE ORAL ONCE
Status: COMPLETED | OUTPATIENT
Start: 2021-04-26 | End: 2021-04-26

## 2021-04-26 RX ORDER — WARFARIN SODIUM 4 MG/1
8 TABLET ORAL
Status: COMPLETED | OUTPATIENT
Start: 2021-04-26 | End: 2021-04-26

## 2021-04-26 RX ADMIN — AMIODARONE HYDROCHLORIDE 200 MG: 200 TABLET ORAL at 08:51

## 2021-04-26 RX ADMIN — CETIRIZINE HYDROCHLORIDE 10 MG: 10 TABLET, FILM COATED ORAL at 08:51

## 2021-04-26 RX ADMIN — ISOSORBIDE DINITRATE 10 MG: 10 TABLET ORAL at 20:08

## 2021-04-26 RX ADMIN — FLUTICASONE FUROATE AND VILANTEROL TRIFENATATE 1 PUFF: 100; 25 POWDER RESPIRATORY (INHALATION) at 08:51

## 2021-04-26 RX ADMIN — WARFARIN SODIUM 8 MG: 4 TABLET ORAL at 18:21

## 2021-04-26 RX ADMIN — DOCUSATE SODIUM 50 MG AND SENNOSIDES 8.6 MG 2 TABLET: 8.6; 5 TABLET, FILM COATED ORAL at 20:16

## 2021-04-26 RX ADMIN — BUPROPION HYDROCHLORIDE 100 MG: 100 TABLET, EXTENDED RELEASE ORAL at 20:09

## 2021-04-26 RX ADMIN — ASPIRIN 81 MG CHEWABLE TABLET 81 MG: 81 TABLET CHEWABLE at 08:51

## 2021-04-26 RX ADMIN — UMECLIDINIUM 1 PUFF: 62.5 AEROSOL, POWDER ORAL at 08:51

## 2021-04-26 RX ADMIN — OXYCODONE HYDROCHLORIDE 5 MG: 5 TABLET ORAL at 18:28

## 2021-04-26 RX ADMIN — BUPROPION HYDROCHLORIDE 100 MG: 100 TABLET, EXTENDED RELEASE ORAL at 08:51

## 2021-04-26 RX ADMIN — DOCUSATE SODIUM 50 MG AND SENNOSIDES 8.6 MG 1 TABLET: 8.6; 5 TABLET, FILM COATED ORAL at 20:09

## 2021-04-26 RX ADMIN — MONTELUKAST 10 MG: 10 TABLET, FILM COATED ORAL at 22:44

## 2021-04-26 RX ADMIN — ISOSORBIDE DINITRATE 10 MG: 10 TABLET ORAL at 08:51

## 2021-04-26 RX ADMIN — METHOCARBAMOL 500 MG: 500 TABLET, FILM COATED ORAL at 22:45

## 2021-04-26 RX ADMIN — BUMETANIDE 4 MG: 2 TABLET ORAL at 08:51

## 2021-04-26 RX ADMIN — POTASSIUM CHLORIDE 40 MEQ: 750 TABLET, EXTENDED RELEASE ORAL at 20:08

## 2021-04-26 RX ADMIN — HYDRALAZINE HYDROCHLORIDE 75 MG: 50 TABLET ORAL at 20:08

## 2021-04-26 RX ADMIN — GABAPENTIN 600 MG: 300 CAPSULE ORAL at 14:33

## 2021-04-26 RX ADMIN — ACETAMINOPHEN 650 MG: 325 TABLET, FILM COATED ORAL at 14:33

## 2021-04-26 RX ADMIN — GABAPENTIN 300 MG: 300 CAPSULE ORAL at 08:51

## 2021-04-26 RX ADMIN — HYDRALAZINE HYDROCHLORIDE 75 MG: 50 TABLET ORAL at 08:51

## 2021-04-26 RX ADMIN — ISOSORBIDE DINITRATE 10 MG: 10 TABLET ORAL at 14:33

## 2021-04-26 RX ADMIN — Medication 50 MG: at 08:49

## 2021-04-26 RX ADMIN — AMITRIPTYLINE HYDROCHLORIDE 25 MG: 25 TABLET, FILM COATED ORAL at 22:45

## 2021-04-26 RX ADMIN — GABAPENTIN 600 MG: 300 CAPSULE ORAL at 22:45

## 2021-04-26 RX ADMIN — POTASSIUM CHLORIDE 40 MEQ: 750 TABLET, EXTENDED RELEASE ORAL at 08:50

## 2021-04-26 RX ADMIN — Medication 50 MG: at 22:45

## 2021-04-26 RX ADMIN — ALLOPURINOL 300 MG: 300 TABLET ORAL at 08:51

## 2021-04-26 RX ADMIN — Medication 10 MG: at 22:51

## 2021-04-26 RX ADMIN — ACETAMINOPHEN 650 MG: 325 TABLET, FILM COATED ORAL at 08:49

## 2021-04-26 RX ADMIN — BUMETANIDE 4 MG: 2 TABLET ORAL at 16:09

## 2021-04-26 RX ADMIN — ANAKINRA 100 MG: 100 INJECTION, SOLUTION SUBCUTANEOUS at 08:52

## 2021-04-26 RX ADMIN — ACETAMINOPHEN 650 MG: 325 TABLET, FILM COATED ORAL at 18:28

## 2021-04-26 RX ADMIN — METHOCARBAMOL 500 MG: 500 TABLET, FILM COATED ORAL at 14:33

## 2021-04-26 RX ADMIN — POTASSIUM CHLORIDE 20 MEQ: 750 TABLET, EXTENDED RELEASE ORAL at 08:50

## 2021-04-26 RX ADMIN — HYDRALAZINE HYDROCHLORIDE 75 MG: 50 TABLET ORAL at 14:33

## 2021-04-26 RX ADMIN — OXYCODONE HYDROCHLORIDE 5 MG: 5 TABLET ORAL at 08:49

## 2021-04-26 ASSESSMENT — ACTIVITIES OF DAILY LIVING (ADL)
ADLS_ACUITY_SCORE: 12

## 2021-04-26 ASSESSMENT — MIFFLIN-ST. JEOR: SCORE: 1787.37

## 2021-04-26 NOTE — PLAN OF CARE
D-HM2 LVAD pt admitted on 02/08/21 for worsening functional status and renal function. INR 2.59. Creatinine 1.66. Recent bilateral carpel tunnel release, now with bilateral hand pain and swelling.  I-Diuresing with po bumex. 3 gram Na diet and 2L FR. 9 mg coumadin tonight per order. Prn tylenol, oxycodone, tramadol and robaxin for hand pain. Started amytriptyline at hs, already on scheduled neurontin. Requested prn melatonin.  A-Voided 950cc this evening after receiving bumex. Pain partially controlled.  P-Continue with current poc. Pt is status 2E for heart transplant.

## 2021-04-26 NOTE — PLAN OF CARE
D: Patient admitted 2/8 for worsening functional status and renal function concerning for worsening HF. Now listed status 2E for heart txp. Hx. NICM, VT s/p CRT-D, s/p LVAD HM 2 6/2017 as DT (however w/recent weight loss now candidate for transplant), COPD, CKD, DM2.  I/A: A&Ox4. VSSA (do not disturb orders overnight). On RA/Cpap at HS. SR (1st degree AVB) 60s-70s w/occasional PVCs. LVAD #s wnl, no alarms. C/o BL wrist pain partially relieved by bilateral wrist braces and rest, no pain meds given overnight. BG stable at HS. Adequate uop. Up ad francisco. Appeared to rest comfortably overnight.  P: Continue to monitor and notify Cards 2 with questions/concerns.

## 2021-04-26 NOTE — PROGRESS NOTES
Plastic & Hand Surgery Note     Subjective:  Reports bilateral hand pain is still there but improving.    Objective:  Temp:  [97  F (36.1  C)-98.4  F (36.9  C)] 98.4  F (36.9  C)  Pulse:  [56-73] 63  Resp:  [14-16] 14  BP: ()/(54-77) 97/77  SpO2:  [94 %-98 %] 98 %  I/O last 3 completed shifts:  In: 1120 [P.O.:1120]  Out: 3360 [Urine:3360]    Gen: NAD  Resp: NLB  Bilateral hands in splints, continues to have increased sensation at fingertips of median n distribution, no swelling throughout    A/P: Jim Willingham is a 65 year old male PMH significant for heart failure, CKD, T2DM, s/p b/l CTR 2/18/21, uncomplicated, now p/w burning pain & increased sensitivity to median n fingers. Exam reassuring, no surgical intervention at this time.  - Continue hand therapy for nerve gliding & desensitization (ordered)  - Continue gabapentin & amitriptyline   - Can continue w/ lidocaine gel as this seems help  - Plastics will follow      Cate Hathaway MD  Plastic Surgery PGY4

## 2021-04-26 NOTE — PROGRESS NOTES
[facility]   Cardiology II Service / Advanced Heart Failure  Daily Progress Note  Date of Service: 4/26/2021      Patient: Jim Willingham  MRN: [id number]  Admission Date: 2/8/2021  Hospital Day # 77    Assessment and Plan: Jim Willingham is a 65 year old male with history of NICM EF 20% c/b VT s/p CRT-D s/p HMII LVAD 6/19/2017 originally intended as destination therapy 2/2 obesity however with recent weight loss now candidate for transplantation. He is admitted for worsening functional status and renal function concerning for worsening heart failure. He is now listed status 2E for transplant.     Chronic systolic heart failure secondary to NICM s/p HM II LVAD. Echo 1/8/21 at 9400 rpm, EF<30%m LVIDd 6.8cm, at least mildly reduced RV function, AoV closed without AI, mild-mod eccentric MR, dilated IVC without collapse. RHC 3/23 RA 10 mPA 25 mPCWP 14 Kee CO/CI 6.5/3.1.  Stage D, NYHA Class III  ACEi/ARB contraindicated due to renal dysfunction. Isordil 10 mg po TID. Hydralazine 75 mg po TID.   BB Contraindicated due to low cardiac output.   Aldosterone antagonist contraindicated due to renal dysfunction  SCD prophylaxis CRT-D  Fluid status: Near euvolemic state   MAP: 87  LDH: 319 4/23  Anticoagulation: Coumadin per pharmacy. INR- 2.67, goal 2.5-3, goal increased in setting of power spikes and elevated flows this hospitalization.    Antiplatelet: ASA 81 mg po daily  - remains on the cardiac transplant list status 2E. Persistent escalation of diuretics in setting of progressive heart failure with refractory hypervolemia.      The patient's HeartMate LVAD was interrogated 4/26/2021  * Speed 9600 rpm   * Pulsatility index 4  * Power 6.7 Manuel   * Flow 6.2 L/minute   Fluid status: Near euvolemic state   Alarms were reviewed, and notable for PI events.  All external components were inspected and showed no evidence of damage or malfunction.     WALTER on CKD Stage III. Cr peaked at 2.22.  - Cr- 1.64 (1.66).       History of Afib. History of VT/VF. Asymptomatic bursts of questionable AF vs NSVT without hemodynamic compromise. RHC 3/23 RA 10 mPA 25 mPCWP 14 Kee CO/CI 6.5/3.1. CHADSVASC-5.   - Continue Amiodarone 200 mg po daily.   - Coumadin as above.      DM type II, controlled. Symptomatic Hypoglycemia, resolved. Low Cortisol: Hgb A1C-6.2. Lantus and Novololg sliding scale insulin discontinued. Endo consult appreciated. C-peptide 13.9 an Proinsulin 18.4. Serum cortisol 7.7 3/6/21, cosyntropin stim test WNL per Endo.   -Per endo --> Fingerstick glucose might not be accurate for hypoglycemia, if patient has glucose <55 (without insulin), plasma glucose should be sent for confirmation of hypoglycemia prior to correction. While the patient is off insulin therapy, POC glucose readings above 60 are acceptable for the patient  - postprandial hypoglycemia this AM. Endo notified, continue Acarbose 25 mg po daily. If recurrent episode pt amenable to increasing Acarbose to 50 mg po daily.      Carpel tunnel, bilateral s/p bilateral release. Evidence of thenar atrophy. Relief with steroid injection 11/20. Carpal tunnel release 2/18/21. Appreciate Ortho/Plastics consult, signed off. Continue Tylenol 650 mg po QID and Gabapentin 300 mg in AM, 600 mg in the afternoon, and 600 mg in the evening.   - Lidocaine gel prn.   - Appreciate Plastic surgery consult, signed off.   - Tramadol prn, Oxycodone BID prn for 4 doses.   - Elavil 25 mg po at HS.  - Hand braces and therapy consult appreciated.      Chronic/resolved conditions  COPD/Asthma: pta Breo Ellipta, albuterol prn.   Depression: pta Bupropion  Right sided weakness, resolved. CT head negative for acute findings. Appreciate Neuro eval.   Staph Hominis Bacteremia. No need for surveillance blood cx per transplant ID.    Rhinovirus, resolved. Bacterial bronchitis. Completed 5 day course of Cefdinir. COVID negative 2/8. Resp PCR +rhinovirus. CXR 2/12 with no overt  "PNA. Stopped cefdinir 2/15. Cleared for transplant per ID. Repeat RVP negative 3/5. COVID repeated due to sinus congestion, negative. Congestion improved with transition from Claritin to Zyrtec.      FEN: 3 gram sodium diet   PROPHY:  Coumadin  LINES:  PIV   DISPO:  TBD pending cardiac transplant.   CODE STATUS: Full Code   ================================================================    Interval History/ROS: he notes mild improvement in palm and finger paresthesia today. He denies fever, chills, chest pain, palpitations, cough, nausea, vomiting, diarrhea, melena, hematochezia, and LE edema. He is tolerating oral intake and ambulating.     Last 24 hr care team notes reviewed.   ROS:  4 point ROS including Respiratory, CV, GI and , other than that noted in the HPI, is negative.     Medications: Reviewed in EPIC.     Physical Exam:   BP (!) 85/79 (BP Location: Right arm)   Pulse 71   Temp 98.3  F (36.8  C) (Oral)   Resp 16   Ht 1.727 m (5' 8\")   Wt 101.8 kg (224 lb 6.4 oz)   SpO2 98%   BMI 34.12 kg/m    GENERAL: Appears alert and oriented times three.   HEENT: Eye symmetrical and free of discharge bilaterally. Mucous membranes moist and without lesions.  NECK: Supple and without lymphadenopathy. JVD at clavicular line.   CV: RRR, S1S2 present with LVAD hum.   RESPIRATORY: Respirations regular, even, and unlabored. Lungs CTA throughout.   GI: Soft and non distended with normoactive bowel sounds present in all quadrants. No tenderness, rebound, guarding. No organomegaly.   EXTREMITIES: Trace bilateral LE peripheral edema. 2+ bilateral pedal pulses.   NEUROLOGIC: Alert and orientated x 3. CN II-XII grossly intact. No focal deficits.   MUSCULOSKELETAL: No joint swelling or tenderness.   SKIN: No jaundice. No rashes or lesions. LVAD drive line covered.     Data:  CMP  Recent Labs   Lab 04/26/21  0523 04/25/21  0632 04/24/21  0732 04/23/21  1739 04/23/21  0635    138 134 134 132*   POTASSIUM 3.5 4.0 3.8 " 3.8 3.8   CHLORIDE 101 101 98 100 101   CO2 27 27 26 29 25   ANIONGAP 8 9 10 6 6   GLC 96 97 92 111* 83   BUN 54* 56* 50* 50* 52*   CR 1.64* 1.66* 1.77* 1.69* 1.74*   GFRESTIMATED 44* 43* 40* 42* 41*   GFRESTBLACK 51* 50* 46* 49* 47*   QAMAR 8.6 8.6 8.9 9.0 8.6   MAG 2.5* 2.5* 2.6*  --  2.7*   PROTTOTAL 6.4*  --   --   --   --    ALBUMIN 3.4  --   --   --   --    BILITOTAL 0.7  --   --   --   --    ALKPHOS 108  --   --   --   --    AST 32  --   --   --   --    ALT 30  --   --   --   --      CBC  Recent Labs   Lab 04/26/21  0523 04/22/21  0527   WBC 3.8* 4.2   RBC 3.80* 3.90*   HGB 11.1* 11.9*   HCT 35.6* 37.2*   MCV 94 95   MCH 29.2 30.5   MCHC 31.2* 32.0   RDW 14.9 15.4*    167     INR  Recent Labs   Lab 04/26/21  0523 04/25/21  0632 04/24/21  0732 04/23/21  0635   INR 2.67* 2.59* 2.54* 2.79*       Patient discussed with Dr. Nguyen.      Soraya Marshall FN  4/26/2021

## 2021-04-26 NOTE — PROGRESS NOTES
CLINICAL NUTRITION SERVICES - REASSESSMENT NOTE     Nutrition Prescription    RECOMMENDATIONS FOR MDs/PROVIDERS TO ORDER:  None at this time.     Malnutrition Status:    Patient does not meet two of the established criteria necessary for diagnosing malnutrition but is at risk for malnutrition.    Recommendations already ordered by Registered Dietitian (RD):  Modified complex carb snack at 10:00 and ordered another at HS.    Future/Additional Recommendations:  1. Rec continue current diet, as ordered. Monitor need for a fluid restriction, if warranted.  2. Assistance with tray set-up and feeding, if needed, due to bilateral hand pain.   3. Consider checking thiamine, folic acid, and vitamin B12 labs (vit B12 lab was WNL, 450 on 1/22/21).   4. Monitor iron status.  5. Monitor glycemic control, especially with low BG recently. DM 2. Hgb A1c of 6.2 on 1/7/21. BG was 96 on 4/26/21.   6. If TFs are needed (if/when post-op Tx), would rec place feeding tube (FT) via radiology due to RNY hx and initiate TFs, Osmolite 1.5 (concentrated, maintenance TF formula) and order Prosource TF modular. Initiate TFs at 15 mL/hr, advancing rate by 10 mL Q 8 hrs (or per provider discretion, pending hemodynamic stability) to goal rate of 65 mL/hr. Osmolite 1.5 at goal of 65 mL/hr and 1 pkt Prosource TF modular daily: 1560 mL TF/day, 2340+ kcals, 97+ g PRO, 1188 ml free H20, 317 g CHO, and 0 g fiber daily.     If begin tube feeds:    - Flush FT with 30 mL water Q 4 hrs for patency. Rec provider adjust free water flushes as needed, pending fluid status.   - Ensure K+/Mg++/Phos labs are ordered daily until TFs advance to goal infusion to evaluate for sx of refeeding with nutrition received. If lytes trend low, aggressively replace lytes.       - If not already ordered, order a multivitamin/mineral (certavite 15 mL/day via FT) to help ensure micronutrient needs being met with suspected hypermetabolic demands and potential interruptions to TF  "infusions.   - Monitor TF and possible need to adjust nutrition support regimen if necessary, pending medical course and nutrition status.       - Monitor need to check a metabolic cart study.     - Send a nutrition consult for \"Registered Dietitian to Order TF per Medical Nutrition Therapy Guidelines\" if desire RD to order TFs.   7. Order the following (Sylvester-en-y Gastric Bypass) if pt agreeable:    Multivitamin/minerals: adult dose 2 times daily    Iron: 45-60 mg elemental daily (18-36 mg daily if low risk) - may partly or fully be covered in multivitamin     Calcium Citrate containing vitamin D: 500 mg 3 times daily or 600 mg 2 times daily    Vitamin B12: sublingual form of at least 500 mcg daily or injection of 1000 mcg monthly     B-50 Complex daily     EVALUATION OF THE PROGRESS TOWARD GOALS   Diet: 3 g Sodium. Ordered to receive two Glucernas at 14:00 (chocolate or strawberry preferred, but varied with butter pecan). Has a snack order for peanut butter on wheat at 10:00.     Intake: Good diet tolerance. Flowsheets indicate pt consuming 100% with a good appetite 4/16-4/21, % on 4/22, 100% with a good appetite (skipped breakfast) on 4/23, % with a good appetite 4/24, and 100% with a good appetite 4/25. Pt states he may be eating a little less than normal. He typically was consuming breakfast but has been skipping lately (generally was eating oatmeal and fruit in the mornings). States he eats his peanut butter sandwich around 10:00 which is his lunch, consumes two Glucernas, and then eats supper from room service or food his wife brought in which is in the fridge. He has snacks sometimes. States he had two ice creams, popcorn, and some cashews yesterday pm. He likes the sides from room service more than the entrees. Has been mixing and matching items from room service. Factors affecting oral intake include food choices, LOS increasing risk for menu fatigue, and potential upcoming surgery. Also, he has " "had bilateral hand pain which has affected his oral intake (not feeling well with pain, more difficult to eat).     NEW FINDINGS   GI: Last stool noted on 4/25 was formed. Unknown number of stools per day, on average.    Endo: Note, BG was 43 mg/dL on 4/19 at 18:45, 51 on 4/19 at 18:53, and then 66 on 4/24 at 21:36. On acarbose (25 mg) daily with breakfast since 4/6/21. Per pt, skips acarbose if not eating breakfast. On 4/24, pt recalls eating meatballs with mashed potatoes and a salad for supper and he felt \"stuffed.\" Later, he felt something in the pit of his stomach and was hypoglycemic (used two apple juices to increase his BG).   Wt hx: 118.4 kg (2/13/20), 106.2 kg (8/7/20), 103.9 kg (11/13/20), 94.8 kg (1/12/21), 99.5 kg (2/8/21, admit), 100.2 kg (2/15/21), 98.2 kg (2/22), 100 kg (3/2), 99.7 kg (3/11), 99.9 kg (4/9), 101.8 kg (4/26) - Pt had intentionally been trying to lose wt for Tx. No overall wt loss at this time since admit but wt fluctuates with fluid status changes and diuresis. Pt on bumex most recently.      MALNUTRITION  % Intake: Decreased intake at times does not meet criteria  % Weight Loss: Not meeting this criteria    Subcutaneous Fat Loss: None observed  Muscle Loss: Temporal:  Mild. Thenar atrophy per chart and per exam, but pt reports this is a result of carpal tunnel.   Fluid Accumulation/Edema: Does not meet criteria  Malnutrition Diagnosis: Patient does not meet two of the established criteria necessary for diagnosing malnutrition but is at risk for malnutrition    Previous Goals   Patient to consume % of nutritionally adequate meal trays TID, or the equivalent with supplements/snacks.  Evaluation: Unresolved.     Previous Nutrition Diagnosis  Predicted inadequate nutrient intake (protein-energy) related to food choices, LOS increasing risk for menu fatigue, and potential upcoming surgery with unknown NPO status duration.  Evaluation: Unresolved. Updated below.     CURRENT NUTRITION " DIAGNOSIS  Predicted inadequate nutrient intake (protein-energy) related to food choices, LOS increasing risk for menu fatigue, potential upcoming surgery, and bilateral hand pain with unknown NPO status duration.     INTERVENTIONS  Implementation  Modify composition of meals/snacks: Modified sandwich to include diet strawberry jelly. Ordered another sandwich with diet strawberry jelly at HS. Discussed other potential food options via room service. Reviewed BG labs with pt.   Collaboration with other providers: Discussed pt with team.     Goals  Patient to consume % of nutritionally adequate meal trays TID, or the equivalent with supplements/snacks.    Monitoring/Evaluation  Progress toward goals will be monitored and evaluated per protocol.      Nutrition will continue to follow.     Sana Costello, MS, RD, LD, [facility]   6C Pgr: [phone]

## 2021-04-27 ENCOUNTER — TELEPHONE (OUTPATIENT)
Dept: TRANSPLANT | Facility: CLINIC | Age: 66
End: 2021-04-27

## 2021-04-27 VITALS — WEIGHT: 226 LBS | BODY MASS INDEX: 34.36 KG/M2

## 2021-04-27 DIAGNOSIS — Z94.1 HEART REPLACED BY TRANSPLANT (H): Primary | ICD-10-CM

## 2021-04-27 LAB
ANION GAP SERPL CALCULATED.3IONS-SCNC: 7 MMOL/L (ref 3–14)
BUN SERPL-MCNC: 52 MG/DL (ref 7–30)
CALCIUM SERPL-MCNC: 8.6 MG/DL (ref 8.5–10.1)
CHLORIDE SERPL-SCNC: 101 MMOL/L (ref 94–109)
CO2 SERPL-SCNC: 28 MMOL/L (ref 20–32)
CREAT SERPL-MCNC: 1.65 MG/DL (ref 0.66–1.25)
GFR SERPL CREATININE-BSD FRML MDRD: 43 ML/MIN/{1.73_M2}
GLUCOSE BLDC GLUCOMTR-MCNC: 126 MG/DL (ref 70–99)
GLUCOSE BLDC GLUCOMTR-MCNC: 134 MG/DL (ref 70–99)
GLUCOSE BLDC GLUCOMTR-MCNC: 164 MG/DL (ref 70–99)
GLUCOSE BLDC GLUCOMTR-MCNC: 71 MG/DL (ref 70–99)
GLUCOSE BLDC GLUCOMTR-MCNC: 85 MG/DL (ref 70–99)
GLUCOSE SERPL-MCNC: 93 MG/DL (ref 70–99)
INR PPP: 2.74 (ref 0.86–1.14)
MAGNESIUM SERPL-MCNC: 2.5 MG/DL (ref 1.6–2.3)
POTASSIUM SERPL-SCNC: 3.8 MMOL/L (ref 3.4–5.3)
SODIUM SERPL-SCNC: 136 MMOL/L (ref 133–144)

## 2021-04-27 PROCEDURE — 999N001017 HC STATISTIC GLUCOSE BY METER IP

## 2021-04-27 PROCEDURE — 250N000013 HC RX MED GY IP 250 OP 250 PS 637: Performed by: PHYSICIAN ASSISTANT

## 2021-04-27 PROCEDURE — 250N000013 HC RX MED GY IP 250 OP 250 PS 637: Performed by: NURSE PRACTITIONER

## 2021-04-27 PROCEDURE — 99232 SBSQ HOSP IP/OBS MODERATE 35: CPT | Mod: 25 | Performed by: NURSE PRACTITIONER

## 2021-04-27 PROCEDURE — 250N000009 HC RX 250: Performed by: NURSE PRACTITIONER

## 2021-04-27 PROCEDURE — 83735 ASSAY OF MAGNESIUM: CPT | Performed by: STUDENT IN AN ORGANIZED HEALTH CARE EDUCATION/TRAINING PROGRAM

## 2021-04-27 PROCEDURE — 84132 ASSAY OF SERUM POTASSIUM: CPT | Performed by: STUDENT IN AN ORGANIZED HEALTH CARE EDUCATION/TRAINING PROGRAM

## 2021-04-27 PROCEDURE — 80048 BASIC METABOLIC PNL TOTAL CA: CPT | Performed by: STUDENT IN AN ORGANIZED HEALTH CARE EDUCATION/TRAINING PROGRAM

## 2021-04-27 PROCEDURE — 85610 PROTHROMBIN TIME: CPT | Performed by: STUDENT IN AN ORGANIZED HEALTH CARE EDUCATION/TRAINING PROGRAM

## 2021-04-27 PROCEDURE — 250N000013 HC RX MED GY IP 250 OP 250 PS 637: Performed by: STUDENT IN AN ORGANIZED HEALTH CARE EDUCATION/TRAINING PROGRAM

## 2021-04-27 PROCEDURE — 250N000013 HC RX MED GY IP 250 OP 250 PS 637: Performed by: INTERNAL MEDICINE

## 2021-04-27 PROCEDURE — 36415 COLL VENOUS BLD VENIPUNCTURE: CPT | Performed by: STUDENT IN AN ORGANIZED HEALTH CARE EDUCATION/TRAINING PROGRAM

## 2021-04-27 PROCEDURE — 214N000001 HC R&B CCU UMMC

## 2021-04-27 PROCEDURE — 93750 INTERROGATION VAD IN PERSON: CPT | Performed by: NURSE PRACTITIONER

## 2021-04-27 RX ORDER — POTASSIUM CHLORIDE 750 MG/1
20 TABLET, EXTENDED RELEASE ORAL ONCE
Status: COMPLETED | OUTPATIENT
Start: 2021-04-27 | End: 2021-04-27

## 2021-04-27 RX ORDER — WARFARIN SODIUM 4 MG/1
8 TABLET ORAL
Status: COMPLETED | OUTPATIENT
Start: 2021-04-27 | End: 2021-04-27

## 2021-04-27 RX ADMIN — HYDRALAZINE HYDROCHLORIDE 75 MG: 50 TABLET ORAL at 20:03

## 2021-04-27 RX ADMIN — ISOSORBIDE DINITRATE 10 MG: 10 TABLET ORAL at 13:56

## 2021-04-27 RX ADMIN — AMIODARONE HYDROCHLORIDE 200 MG: 200 TABLET ORAL at 08:38

## 2021-04-27 RX ADMIN — ISOSORBIDE DINITRATE 10 MG: 10 TABLET ORAL at 20:03

## 2021-04-27 RX ADMIN — OXYCODONE HYDROCHLORIDE 5 MG: 5 TABLET ORAL at 10:38

## 2021-04-27 RX ADMIN — Medication 10 MG: at 21:51

## 2021-04-27 RX ADMIN — GABAPENTIN 300 MG: 300 CAPSULE ORAL at 08:39

## 2021-04-27 RX ADMIN — HYDRALAZINE HYDROCHLORIDE 75 MG: 50 TABLET ORAL at 08:39

## 2021-04-27 RX ADMIN — ACETAMINOPHEN 650 MG: 325 TABLET, FILM COATED ORAL at 06:10

## 2021-04-27 RX ADMIN — DOCUSATE SODIUM 50 MG AND SENNOSIDES 8.6 MG 1 TABLET: 8.6; 5 TABLET, FILM COATED ORAL at 20:03

## 2021-04-27 RX ADMIN — METHOCARBAMOL 500 MG: 500 TABLET, FILM COATED ORAL at 06:19

## 2021-04-27 RX ADMIN — MONTELUKAST 10 MG: 10 TABLET, FILM COATED ORAL at 21:51

## 2021-04-27 RX ADMIN — ALLOPURINOL 300 MG: 300 TABLET ORAL at 08:39

## 2021-04-27 RX ADMIN — Medication 50 MG: at 06:10

## 2021-04-27 RX ADMIN — POTASSIUM CHLORIDE 40 MEQ: 750 TABLET, EXTENDED RELEASE ORAL at 20:02

## 2021-04-27 RX ADMIN — ACARBOSE 25 MG: 25 TABLET ORAL at 06:10

## 2021-04-27 RX ADMIN — POTASSIUM CHLORIDE 40 MEQ: 750 TABLET, EXTENDED RELEASE ORAL at 08:39

## 2021-04-27 RX ADMIN — ASPIRIN 81 MG CHEWABLE TABLET 81 MG: 81 TABLET CHEWABLE at 08:39

## 2021-04-27 RX ADMIN — BUMETANIDE 4 MG: 2 TABLET ORAL at 15:44

## 2021-04-27 RX ADMIN — HYDRALAZINE HYDROCHLORIDE 75 MG: 50 TABLET ORAL at 13:55

## 2021-04-27 RX ADMIN — GABAPENTIN 600 MG: 300 CAPSULE ORAL at 21:57

## 2021-04-27 RX ADMIN — CETIRIZINE HYDROCHLORIDE 10 MG: 10 TABLET, FILM COATED ORAL at 08:38

## 2021-04-27 RX ADMIN — FLUTICASONE FUROATE AND VILANTEROL TRIFENATATE 1 PUFF: 100; 25 POWDER RESPIRATORY (INHALATION) at 08:38

## 2021-04-27 RX ADMIN — ISOSORBIDE DINITRATE 10 MG: 10 TABLET ORAL at 08:39

## 2021-04-27 RX ADMIN — ACETAMINOPHEN 650 MG: 325 TABLET, FILM COATED ORAL at 21:52

## 2021-04-27 RX ADMIN — GABAPENTIN 600 MG: 300 CAPSULE ORAL at 13:56

## 2021-04-27 RX ADMIN — AMITRIPTYLINE HYDROCHLORIDE 25 MG: 25 TABLET, FILM COATED ORAL at 21:51

## 2021-04-27 RX ADMIN — POTASSIUM CHLORIDE 20 MEQ: 750 TABLET, EXTENDED RELEASE ORAL at 14:53

## 2021-04-27 RX ADMIN — METHOCARBAMOL 500 MG: 500 TABLET, FILM COATED ORAL at 15:44

## 2021-04-27 RX ADMIN — Medication 50 MG: at 15:44

## 2021-04-27 RX ADMIN — BUPROPION HYDROCHLORIDE 100 MG: 100 TABLET, EXTENDED RELEASE ORAL at 20:03

## 2021-04-27 RX ADMIN — WARFARIN SODIUM 8 MG: 4 TABLET ORAL at 17:59

## 2021-04-27 RX ADMIN — ACETAMINOPHEN 650 MG: 325 TABLET, FILM COATED ORAL at 10:38

## 2021-04-27 RX ADMIN — ANAKINRA 100 MG: 100 INJECTION, SOLUTION SUBCUTANEOUS at 08:44

## 2021-04-27 RX ADMIN — BUPROPION HYDROCHLORIDE 100 MG: 100 TABLET, EXTENDED RELEASE ORAL at 08:38

## 2021-04-27 RX ADMIN — Medication 50 MG: at 21:51

## 2021-04-27 RX ADMIN — METHOCARBAMOL 500 MG: 500 TABLET, FILM COATED ORAL at 21:51

## 2021-04-27 RX ADMIN — BUMETANIDE 4 MG: 2 TABLET ORAL at 08:39

## 2021-04-27 RX ADMIN — UMECLIDINIUM 1 PUFF: 62.5 AEROSOL, POWDER ORAL at 08:38

## 2021-04-27 RX ADMIN — ACETAMINOPHEN 650 MG: 325 TABLET, FILM COATED ORAL at 15:44

## 2021-04-27 ASSESSMENT — ACTIVITIES OF DAILY LIVING (ADL)
ADLS_ACUITY_SCORE: 12

## 2021-04-27 ASSESSMENT — MIFFLIN-ST. JEOR: SCORE: 1794.63

## 2021-04-27 NOTE — PROGRESS NOTES
[facility]   Cardiology II Service / Advanced Heart Failure  Daily Progress Note  Date of Service: 4/27/2021      Patient: Jim Willingham  MRN: [id number]  Admission Date: 2/8/2021  Hospital Day # 78    Assessment and Plan: Jim Willingham is a 65 year old male with history of NICM EF 20% c/b VT s/p CRT-D s/p HMII LVAD 6/19/2017 originally intended as destination therapy 2/2 obesity however with recent weight loss now candidate for transplantation. He is admitted for worsening functional status and renal function concerning for worsening heart failure. He is now listed status 2E for transplant.     Chronic systolic heart failure secondary to NICM s/p HM II LVAD. Echo 1/8/21 at 9400 rpm, EF<30%m LVIDd 6.8cm, at least mildly reduced RV function, AoV closed without AI, mild-mod eccentric MR, dilated IVC without collapse. RHC 3/23 RA 10 mPA 25 mPCWP 14 Kee CO/CI 6.5/3.1.  Stage D, NYHA Class III  ACEi/ARB contraindicated due to renal dysfunction. Isordil 10 mg po TID. Hydralazine 75 mg po TID.   BB Contraindicated due to low cardiac output.   Aldosterone antagonist contraindicated due to renal dysfunction  SCD prophylaxis CRT-D  Fluid status: Near euvolemic state   MAP: 67  LDH: 319 4/23  Anticoagulation: Coumadin per pharmacy. INR- 2.74, goal 2.5-3, goal increased in setting of power spikes and elevated flows this hospitalization.    Antiplatelet: ASA 81 mg po daily  - remains on the cardiac transplant list status 2E. Persistent escalation of diuretics in setting of progressive heart failure with refractory hypervolemia.      The patient's HeartMate LVAD was interrogated 4/27/2021  * Speed 9600 rpm   * Pulsatility index 3.1  * Power 7.5 Manuel   * Flow 7.7 L/minute   Fluid status: Near euvolemic state   Alarms were reviewed, and notable for PI events.  All external components were inspected and showed no evidence of damage or malfunction.     WALTER on CKD Stage III. Cr peaked at 2.22.  - Cr- 1.65 (1.64).       History of Afib. History of VT/VF. Asymptomatic bursts of questionable AF vs NSVT without hemodynamic compromise. RHC 3/23 RA 10 mPA 25 mPCWP 14 Kee CO/CI 6.5/3.1. CHADSVASC-5.   - Continue Amiodarone 200 mg po daily.   - Coumadin as above.      DM type II, controlled. Symptomatic Hypoglycemia, resolved. Low Cortisol: Hgb A1C-6.2. Lantus and Novololg sliding scale insulin discontinued. Endo consult appreciated. C-peptide 13.9 an Proinsulin 18.4. Serum cortisol 7.7 3/6/21, cosyntropin stim test WNL per Endo.   -Per endo --> Fingerstick glucose might not be accurate for hypoglycemia, if patient has glucose <55 (without insulin), plasma glucose should be sent for confirmation of hypoglycemia prior to correction. While the patient is off insulin therapy, POC glucose readings above 60 are acceptable for the patient  - postprandial hypoglycemia this AM. Endo notified, continue Acarbose 25 mg po daily. If recurrent episode pt amenable to increasing Acarbose to 50 mg po daily.      Carpel tunnel, bilateral s/p bilateral release. Evidence of thenar atrophy. Relief with steroid injection 11/20. Carpal tunnel release 2/18/21. Appreciate Ortho/Plastics consult, signed off. Continue Tylenol 650 mg po QID and Gabapentin 300 mg in AM, 600 mg in the afternoon, and 600 mg in the evening.   - Lidocaine gel prn.   - Appreciate Plastic surgery consult, signed off.   - Tramadol prn, Oxycodone BID prn for 4 doses.   - Elavil 25 mg po at HS.  - Hand braces and therapy consult appreciated.      Chronic/resolved conditions  COPD/Asthma: pta Breo Ellipta, albuterol prn.   Depression: pta Bupropion  Right sided weakness, resolved. CT head negative for acute findings. Appreciate Neuro eval.   Staph Hominis Bacteremia. No need for surveillance blood cx per transplant ID.    Rhinovirus, resolved. Bacterial bronchitis. Completed 5 day course of Cefdinir. COVID negative 2/8. Resp PCR +rhinovirus. CXR 2/12 with no overt  "PNA. Stopped cefdinir 2/15. Cleared for transplant per ID. Repeat RVP negative 3/5. COVID repeated due to sinus congestion, negative. Congestion improved with transition from Claritin to Zyrtec.      FEN: 3 gram sodium diet   PROPHY:  Coumadin  LINES:  PIV   DISPO:  TBD pending cardiac transplant.   CODE STATUS: Full Code   ================================================================  Interval History/ROS: He complains of left shoulder pain. He denies fever, chills, chest pain, palpitations, cough, nausea, vomiting, RESENDIZ, diarrhea, and LE edema. He is tolerating oral intake and ambulation.     Last 24 hr care team notes reviewed.   ROS:  4 point ROS including Respiratory, CV, GI and , other than that noted in the HPI, is negative.     Medications: Reviewed in EPIC.     Physical Exam:   BP 98/87 (BP Location: Left arm)   Pulse 72   Temp 98.1  F (36.7  C) (Oral)   Resp 16   Ht 1.727 m (5' 8\")   Wt 102.5 kg (226 lb)   SpO2 95%   BMI 34.36 kg/m    GENERAL: Appears alert and oriented times three.   HEENT: Eye symmetrical and free of discharge bilaterally. Mucous membranes moist and without lesions.  NECK: Supple and without lymphadenopathy. JVD 10 cm.   CV: RRR, S1S2 present with LVAD hum.   RESPIRATORY: Respirations regular, even, and unlabored. Lungs CTA throughout.   GI: Soft and non distended with normoactive bowel sounds present in all quadrants. No tenderness, rebound, guarding. No organomegaly.   EXTREMITIES: Trace bilateral LE peripheral edema. 2+ bilateral pedal pulses.   NEUROLOGIC: Alert and orientated x 3. CN II-XII grossly intact. No focal deficits.   MUSCULOSKELETAL: No joint swelling or tenderness.   SKIN: No jaundice. No rashes or lesions. LVAD drive line covered.     Data:  CMP  Recent Labs   Lab 04/27/21  0611 04/26/21  0523 04/25/21  0632 04/24/21  0732    135 138 134   POTASSIUM 3.8 3.5 4.0 3.8   CHLORIDE 101 101 101 98   CO2 28 27 27 26   ANIONGAP 7 8 9 10   GLC 93 96 97 92   BUN " 52* 54* 56* 50*   CR 1.65* 1.64* 1.66* 1.77*   GFRESTIMATED 43* 44* 43* 40*   GFRESTBLACK 50* 51* 50* 46*   QAMAR 8.6 8.6 8.6 8.9   MAG 2.5* 2.5* 2.5* 2.6*   PROTTOTAL  --  6.4*  --   --    ALBUMIN  --  3.4  --   --    BILITOTAL  --  0.7  --   --    ALKPHOS  --  108  --   --    AST  --  32  --   --    ALT  --  30  --   --      CBC  Recent Labs   Lab 04/26/21  0523 04/22/21  0527   WBC 3.8* 4.2   RBC 3.80* 3.90*   HGB 11.1* 11.9*   HCT 35.6* 37.2*   MCV 94 95   MCH 29.2 30.5   MCHC 31.2* 32.0   RDW 14.9 15.4*    167     INR  Recent Labs   Lab 04/27/21  0611 04/26/21  0523 04/25/21  0632 04/24/21  0732   INR 2.74* 2.67* 2.59* 2.54*       Patient discussed with Dr. Nguyen.      Soraya Marshall FN  4/27/2021

## 2021-04-27 NOTE — PLAN OF CARE
Temp: 97.6  F (36.4  C) Temp src: Oral BP: (!) 87/73 Pulse: 66   Resp: 18 SpO2: 98 % O2 Device: None (Room air)      PRN: Tylenol, Oxy, Robaxin, Tramadol, senna    A:   Neuro: A/Ox4. Calls appropriately   Cardiac/Tele:  VVS. SR. LVAD HM3 numbers WDL. Afebrile. Denies chest pain   Respiratory: Room air. Tolerating well  GI/: Continent. Urinal at bedside. No BM this shift  Diet/Appetite: 3 gram Na+ diet. 2L FR. BG ACHS  Skin: No new deficits noted. VAD dressing CDI.  LDAs: R PIV- SL  Activity: Independent   Pain: Reports pain on bilateral hands. PRN given    P: Continue to monitor and notify team with changes.

## 2021-04-27 NOTE — PROGRESS NOTES
D:Up out of bed watching movies on computer.   Complained of hand pain and was medicated with tylenol and oxycodone with decrease in pain.  No other physical  Complaints.   No chest pain or shortness of breath.  Lungs are diminished in the lower lobes bilaterally.  O2 sat 99% on room air.  Rhythm is  NSR with 1st degree AVB and  BBB.   Occasional unifocal  PVC's.   Not new.  LVAD  Drive line site intact.   No redness, swelling or drainage.  LVAD PF 6.1  PI 5.4  PS 9600  PWR 6.7.   All alarm light illuminated with checks.   No alarms.  Temp 98.1  HR 71  Bp 88/79  MAP 83  RR 16.    A:Moderate bilateral  Hand pain.   Improved with pain medication.   Has good appetite.  FSG at noon 71  Patient had meal tray at the time.  Rhythm is stable.   AVSS.  O2 sat WNL on room  Air.   No acute changes.   LVAD numbers are within therapeutic range.  Condition  Is stable.    P:Continue to assess status.   UAL   Monitor rhytm, temp and vital signs and LVAD flows.  Notify primary team with any acute changes  Or any arrhythmias.

## 2021-04-28 LAB
ANION GAP SERPL CALCULATED.3IONS-SCNC: 4 MMOL/L (ref 3–14)
ANION GAP SERPL CALCULATED.3IONS-SCNC: 6 MMOL/L (ref 3–14)
BUN SERPL-MCNC: 46 MG/DL (ref 7–30)
BUN SERPL-MCNC: 51 MG/DL (ref 7–30)
CALCIUM SERPL-MCNC: 8.8 MG/DL (ref 8.5–10.1)
CALCIUM SERPL-MCNC: 8.9 MG/DL (ref 8.5–10.1)
CHLORIDE SERPL-SCNC: 100 MMOL/L (ref 94–109)
CHLORIDE SERPL-SCNC: 102 MMOL/L (ref 94–109)
CO2 SERPL-SCNC: 27 MMOL/L (ref 20–32)
CO2 SERPL-SCNC: 28 MMOL/L (ref 20–32)
CREAT SERPL-MCNC: 1.57 MG/DL (ref 0.66–1.25)
CREAT SERPL-MCNC: 1.69 MG/DL (ref 0.66–1.25)
GFR SERPL CREATININE-BSD FRML MDRD: 42 ML/MIN/{1.73_M2}
GFR SERPL CREATININE-BSD FRML MDRD: 46 ML/MIN/{1.73_M2}
GLUCOSE BLDC GLUCOMTR-MCNC: 145 MG/DL (ref 70–99)
GLUCOSE BLDC GLUCOMTR-MCNC: 264 MG/DL (ref 70–99)
GLUCOSE BLDC GLUCOMTR-MCNC: 93 MG/DL (ref 70–99)
GLUCOSE BLDC GLUCOMTR-MCNC: 98 MG/DL (ref 70–99)
GLUCOSE SERPL-MCNC: 86 MG/DL (ref 70–99)
GLUCOSE SERPL-MCNC: 93 MG/DL (ref 70–99)
INR PPP: 2.95 (ref 0.86–1.14)
MAGNESIUM SERPL-MCNC: 2.5 MG/DL (ref 1.6–2.3)
POTASSIUM SERPL-SCNC: 3.8 MMOL/L (ref 3.4–5.3)
POTASSIUM SERPL-SCNC: 4.1 MMOL/L (ref 3.4–5.3)
SODIUM SERPL-SCNC: 133 MMOL/L (ref 133–144)
SODIUM SERPL-SCNC: 136 MMOL/L (ref 133–144)

## 2021-04-28 PROCEDURE — 99232 SBSQ HOSP IP/OBS MODERATE 35: CPT | Mod: 25 | Performed by: NURSE PRACTITIONER

## 2021-04-28 PROCEDURE — 36415 COLL VENOUS BLD VENIPUNCTURE: CPT | Performed by: INTERNAL MEDICINE

## 2021-04-28 PROCEDURE — 999N001017 HC STATISTIC GLUCOSE BY METER IP

## 2021-04-28 PROCEDURE — 250N000013 HC RX MED GY IP 250 OP 250 PS 637: Performed by: INTERNAL MEDICINE

## 2021-04-28 PROCEDURE — 80048 BASIC METABOLIC PNL TOTAL CA: CPT | Performed by: STUDENT IN AN ORGANIZED HEALTH CARE EDUCATION/TRAINING PROGRAM

## 2021-04-28 PROCEDURE — 93750 INTERROGATION VAD IN PERSON: CPT | Performed by: NURSE PRACTITIONER

## 2021-04-28 PROCEDURE — 90832 PSYTX W PT 30 MINUTES: CPT | Performed by: PSYCHOLOGIST

## 2021-04-28 PROCEDURE — 83735 ASSAY OF MAGNESIUM: CPT | Performed by: STUDENT IN AN ORGANIZED HEALTH CARE EDUCATION/TRAINING PROGRAM

## 2021-04-28 PROCEDURE — 250N000013 HC RX MED GY IP 250 OP 250 PS 637: Performed by: NURSE PRACTITIONER

## 2021-04-28 PROCEDURE — 36415 COLL VENOUS BLD VENIPUNCTURE: CPT | Performed by: STUDENT IN AN ORGANIZED HEALTH CARE EDUCATION/TRAINING PROGRAM

## 2021-04-28 PROCEDURE — 250N000009 HC RX 250: Performed by: NURSE PRACTITIONER

## 2021-04-28 PROCEDURE — 85610 PROTHROMBIN TIME: CPT | Performed by: STUDENT IN AN ORGANIZED HEALTH CARE EDUCATION/TRAINING PROGRAM

## 2021-04-28 PROCEDURE — 36415 COLL VENOUS BLD VENIPUNCTURE: CPT | Performed by: NURSE PRACTITIONER

## 2021-04-28 PROCEDURE — 250N000013 HC RX MED GY IP 250 OP 250 PS 637: Performed by: PHYSICIAN ASSISTANT

## 2021-04-28 PROCEDURE — 80048 BASIC METABOLIC PNL TOTAL CA: CPT | Performed by: NURSE PRACTITIONER

## 2021-04-28 PROCEDURE — 214N000001 HC R&B CCU UMMC

## 2021-04-28 PROCEDURE — 250N000013 HC RX MED GY IP 250 OP 250 PS 637: Performed by: STUDENT IN AN ORGANIZED HEALTH CARE EDUCATION/TRAINING PROGRAM

## 2021-04-28 RX ORDER — POTASSIUM CHLORIDE 1.5 G/1.58G
20 POWDER, FOR SOLUTION ORAL ONCE
Status: DISCONTINUED | OUTPATIENT
Start: 2021-04-28 | End: 2021-04-28

## 2021-04-28 RX ORDER — POTASSIUM CHLORIDE 1.5 G/1.58G
40 POWDER, FOR SOLUTION ORAL ONCE
Status: COMPLETED | OUTPATIENT
Start: 2021-04-28 | End: 2021-04-28

## 2021-04-28 RX ORDER — POTASSIUM CHLORIDE 20MEQ/15ML
20 LIQUID (ML) ORAL ONCE
Status: COMPLETED | OUTPATIENT
Start: 2021-04-28 | End: 2021-04-28

## 2021-04-28 RX ORDER — WARFARIN SODIUM 7.5 MG/1
7.5 TABLET ORAL
Status: COMPLETED | OUTPATIENT
Start: 2021-04-28 | End: 2021-04-28

## 2021-04-28 RX ORDER — POTASSIUM CHLORIDE 750 MG/1
20 TABLET, EXTENDED RELEASE ORAL ONCE
Status: COMPLETED | OUTPATIENT
Start: 2021-04-28 | End: 2021-04-28

## 2021-04-28 RX ADMIN — POTASSIUM CHLORIDE 20 MEQ: 20 SOLUTION ORAL at 15:03

## 2021-04-28 RX ADMIN — POTASSIUM CHLORIDE 40 MEQ: 1.5 POWDER, FOR SOLUTION ORAL at 20:41

## 2021-04-28 RX ADMIN — METHOCARBAMOL 500 MG: 500 TABLET, FILM COATED ORAL at 12:07

## 2021-04-28 RX ADMIN — Medication 50 MG: at 22:04

## 2021-04-28 RX ADMIN — Medication 50 MG: at 05:58

## 2021-04-28 RX ADMIN — Medication 50 MG: at 12:07

## 2021-04-28 RX ADMIN — AMIODARONE HYDROCHLORIDE 200 MG: 200 TABLET ORAL at 08:03

## 2021-04-28 RX ADMIN — POTASSIUM CHLORIDE 20 MEQ: 750 TABLET, EXTENDED RELEASE ORAL at 20:21

## 2021-04-28 RX ADMIN — AMITRIPTYLINE HYDROCHLORIDE 25 MG: 25 TABLET, FILM COATED ORAL at 22:04

## 2021-04-28 RX ADMIN — UMECLIDINIUM 1 PUFF: 62.5 AEROSOL, POWDER ORAL at 08:11

## 2021-04-28 RX ADMIN — ALLOPURINOL 300 MG: 300 TABLET ORAL at 08:03

## 2021-04-28 RX ADMIN — HYDRALAZINE HYDROCHLORIDE 75 MG: 50 TABLET ORAL at 08:03

## 2021-04-28 RX ADMIN — GABAPENTIN 600 MG: 300 CAPSULE ORAL at 22:04

## 2021-04-28 RX ADMIN — METHOCARBAMOL 500 MG: 500 TABLET, FILM COATED ORAL at 22:04

## 2021-04-28 RX ADMIN — ACETAMINOPHEN 650 MG: 325 TABLET, FILM COATED ORAL at 22:04

## 2021-04-28 RX ADMIN — BUPROPION HYDROCHLORIDE 100 MG: 100 TABLET, EXTENDED RELEASE ORAL at 20:18

## 2021-04-28 RX ADMIN — HYDRALAZINE HYDROCHLORIDE 75 MG: 50 TABLET ORAL at 20:18

## 2021-04-28 RX ADMIN — ASPIRIN 81 MG CHEWABLE TABLET 81 MG: 81 TABLET CHEWABLE at 08:12

## 2021-04-28 RX ADMIN — ISOSORBIDE DINITRATE 10 MG: 10 TABLET ORAL at 08:03

## 2021-04-28 RX ADMIN — LIDOCAINE HYDROCHLORIDE: 20 JELLY TOPICAL at 08:12

## 2021-04-28 RX ADMIN — BUPROPION HYDROCHLORIDE 100 MG: 100 TABLET, EXTENDED RELEASE ORAL at 08:03

## 2021-04-28 RX ADMIN — ACETAMINOPHEN 650 MG: 325 TABLET, FILM COATED ORAL at 05:58

## 2021-04-28 RX ADMIN — GABAPENTIN 600 MG: 300 CAPSULE ORAL at 15:02

## 2021-04-28 RX ADMIN — WARFARIN SODIUM 7.5 MG: 7.5 TABLET ORAL at 18:40

## 2021-04-28 RX ADMIN — ACETAMINOPHEN 650 MG: 325 TABLET, FILM COATED ORAL at 12:07

## 2021-04-28 RX ADMIN — METHOCARBAMOL 500 MG: 500 TABLET, FILM COATED ORAL at 05:58

## 2021-04-28 RX ADMIN — FLUTICASONE FUROATE AND VILANTEROL TRIFENATATE 1 PUFF: 100; 25 POWDER RESPIRATORY (INHALATION) at 08:11

## 2021-04-28 RX ADMIN — MONTELUKAST 10 MG: 10 TABLET, FILM COATED ORAL at 22:04

## 2021-04-28 RX ADMIN — ANAKINRA 100 MG: 100 INJECTION, SOLUTION SUBCUTANEOUS at 08:13

## 2021-04-28 RX ADMIN — BUMETANIDE 4 MG: 2 TABLET ORAL at 15:07

## 2021-04-28 RX ADMIN — DOCUSATE SODIUM 50 MG AND SENNOSIDES 8.6 MG 1 TABLET: 8.6; 5 TABLET, FILM COATED ORAL at 20:17

## 2021-04-28 RX ADMIN — CHLOROTHIAZIDE 250 MG: 250 SUSPENSION ORAL at 15:03

## 2021-04-28 RX ADMIN — ACARBOSE 25 MG: 25 TABLET ORAL at 05:58

## 2021-04-28 RX ADMIN — BUMETANIDE 4 MG: 2 TABLET ORAL at 08:03

## 2021-04-28 RX ADMIN — CETIRIZINE HYDROCHLORIDE 10 MG: 10 TABLET, FILM COATED ORAL at 08:03

## 2021-04-28 RX ADMIN — GABAPENTIN 300 MG: 300 CAPSULE ORAL at 08:03

## 2021-04-28 RX ADMIN — POTASSIUM CHLORIDE 40 MEQ: 750 TABLET, EXTENDED RELEASE ORAL at 08:03

## 2021-04-28 RX ADMIN — ISOSORBIDE DINITRATE 10 MG: 10 TABLET ORAL at 20:18

## 2021-04-28 RX ADMIN — HYDRALAZINE HYDROCHLORIDE 75 MG: 50 TABLET ORAL at 15:02

## 2021-04-28 RX ADMIN — ISOSORBIDE DINITRATE 10 MG: 10 TABLET ORAL at 15:02

## 2021-04-28 ASSESSMENT — ACTIVITIES OF DAILY LIVING (ADL)
ADLS_ACUITY_SCORE: 12

## 2021-04-28 ASSESSMENT — MIFFLIN-ST. JEOR: SCORE: 1799.17

## 2021-04-28 NOTE — PLAN OF CARE
Presents for worsening dyspnea on exertion, lethargy. Decompensated heart failure. Currently here for heart txp waitlist status 2E. Pmhx:  NICM EF 20% c/b VT s/p CRT-D s/p HMII LVAD 2017 originally intended as destination therapy 2/2 obesity, recent weight loss, afib s/p DCCV on amiodarone and warfarin, staph hominis bacteremia, CKD3, COPD    Code status: Full     Team: cards 2  PRN: Tramadol            Robaxin            Tylenol             External lidocaine gel     Neuro: ao*4  Cardiac/Tele:  SR w/ 1*AVB w/ BBB, LVAD #'s wnl  Respiratory: room air  GI/: urinating via urinal/toliet  Diet/Appetite: 3g na   Skin: LVAD dressing CDI and changed  Endocrine: ACHS  LDAs: PIV SL  Activity: up ad francisco  Pain: wrist and hand     Nuris Darling, RN  Time cared for 0700 - 1930

## 2021-04-28 NOTE — PROGRESS NOTES
[facility]   Cardiology II Service / Advanced Heart Failure  Daily Progress Note  Date of Service: 4/28/2021      Patient: Jim Willingham  MRN: [id number]  Admission Date: 2/8/2021  Hospital Day # 79    Assessment and Plan: Jim Willingham is a 65 year old male with history of NICM EF 20% c/b VT s/p CRT-D s/p HMII LVAD 6/19/2017 originally intended as destination therapy 2/2 obesity however with recent weight loss now candidate for transplantation. He is admitted for worsening functional status and renal function concerning for worsening heart failure. He is now listed status 2E for transplant.     Chronic systolic heart failure secondary to NICM s/p HM II LVAD. Echo 1/8/21 at 9400 rpm, EF<30%m LVIDd 6.8cm, at least mildly reduced RV function, AoV closed without AI, mild-mod eccentric MR, dilated IVC without collapse. RHC 3/23 RA 10 mPA 25 mPCWP 14 Kee CO/CI 6.5/3.1.  Stage D, NYHA Class III  ACEi/ARB contraindicated due to renal dysfunction. Isordil 10 mg po TID. Hydralazine 75 mg po TID.   BB Contraindicated due to low cardiac output.   Aldosterone antagonist contraindicated due to renal dysfunction  SCD prophylaxis CRT-D  Fluid status: hypervolemic. Bumex 4 mg po BID. Diuril 250 mg po times one.   MAP: 82  LDH: 319 4/23  Anticoagulation: Coumadin per pharmacy. INR- 2.95, goal 2.5-3, goal increased in setting of power spikes and elevated flows this hospitalization.    Antiplatelet: ASA 81 mg po daily  - remains on the cardiac transplant list status 2E. Persistent escalation of diuretics in setting of progressive heart failure with refractory hypervolemia.      The patient's HeartMate LVAD was interrogated 4/28/2021  * Speed 9600 rpm   * Pulsatility index 3.8  * Power 7.2 Manuel   * Flow 6.2 L/minute   Fluid status: hypervolemic  Alarms were reviewed, and notable for PI events.  All external components were inspected and showed no evidence of damage or malfunction.     WALTER on CKD Stage III. Cr peaked  at 2.22.  - Cr- 1.57 (1.65).      History of Afib. History of VT/VF. Asymptomatic bursts of questionable AF vs NSVT without hemodynamic compromise. RHC 3/23 RA 10 mPA 25 mPCWP 14 Kee CO/CI 6.5/3.1. CHADSVASC-5.   - Continue Amiodarone 200 mg po daily.   - Coumadin as above.      DM type II, controlled. Symptomatic Hypoglycemia, resolved. Low Cortisol: Hgb A1C-6.2. Lantus and Novololg sliding scale insulin discontinued. Endo consult appreciated. C-peptide 13.9 an Proinsulin 18.4. Serum cortisol 7.7 3/6/21, cosyntropin stim test WNL per Endo.   -Per endo --> Fingerstick glucose might not be accurate for hypoglycemia, if patient has glucose <55 (without insulin), plasma glucose should be sent for confirmation of hypoglycemia prior to correction. While the patient is off insulin therapy, POC glucose readings above 60 are acceptable for the patient  - postprandial hypoglycemia this AM. Endo notified, continue Acarbose 25 mg po daily. If recurrent episode pt amenable to increasing Acarbose to 50 mg po daily.      Carpel tunnel, bilateral s/p bilateral release. Evidence of thenar atrophy. Relief with steroid injection 11/20. Carpal tunnel release 2/18/21. Appreciate Ortho/Plastics consult, signed off. Continue Tylenol 650 mg po QID and Gabapentin 300 mg in AM, 600 mg in the afternoon, and 600 mg in the evening.   - Lidocaine gel prn.   - Appreciate Plastic surgery consult, signed off.   - Tramadol prn.  - Elavil 25 mg po at HS.  - Hand braces and therapy consult appreciated.      Chronic/resolved conditions  COPD/Asthma: pta Breo Ellipta, albuterol prn.   Depression: pta Bupropion  Right sided weakness, resolved. CT head negative for acute findings. Appreciate Neuro eval.   Staph Hominis Bacteremia. No need for surveillance blood cx per transplant ID.    Rhinovirus, resolved. Bacterial bronchitis. Completed 5 day course of Cefdinir. COVID negative 2/8. Resp PCR +rhinovirus. CXR 2/12 with no overt  "PNA. Stopped cefdinir 2/15. Cleared for transplant per ID. Repeat RVP negative 3/5. COVID repeated due to sinus congestion, negative. Congestion improved with transition from Claritin to Zyrtec.      FEN: 3 gram sodium diet   PROPHY:  Coumadin  LINES:  PIV   DISPO:  TBD pending cardiac transplant.   CODE STATUS: Full Code   ================================================================  Interval History/ROS: He complains of bilateral hand pain, right > left. He denies fever, chills, chest pain, palpitations, nausea, vomiting, diarrhea, melena, and hematochezia. He complains of RESENDIZ, abdominal distention, and mild LE edema. He ate a pork chop sandwich for breakfast and is tolerating ambulation independently, but notes decreased ambulation.     Last 24 hr care team notes reviewed.   ROS:  4 point ROS including Respiratory, CV, GI and , other than that noted in the HPI, is negative.     Medications: Reviewed in EPIC.     Physical Exam:   BP 91/82 (BP Location: Right arm)   Pulse 73   Temp 98.3  F (36.8  C) (Oral)   Resp 16   Ht 1.727 m (5' 8\")   Wt 103 kg (227 lb)   SpO2 97%   BMI 34.52 kg/m    GENERAL: Appears alert and oriented times three.   HEENT: Eye symmetrical and free of discharge bilaterally. Mucous membranes moist and without lesions.  NECK: Supple and without lymphadenopathy. JVD 10 cm.   CV: RRR, S1S2 present with LVAD hum.   RESPIRATORY: Respirations regular, even, and unlabored. Lungs CTA throughout.   GI: Soft and mildly distended with normoactive bowel sounds present in all quadrants. No tenderness, rebound, guarding. No organomegaly.   EXTREMITIES: +1 bilateral LE peripheral edema. 2+ bilateral pedal pulses.   NEUROLOGIC: Alert and orientated x 3. CN II-XII grossly intact. No focal deficits.   MUSCULOSKELETAL: No joint swelling or tenderness.   SKIN: No jaundice. No rashes or lesions. LVAD drive line covered.     Data:  CMP  Recent Labs   Lab 04/28/21  0604 04/27/21  0611 04/26/21  0523 " 04/25/21  0632    136 135 138   POTASSIUM 4.1 3.8 3.5 4.0   CHLORIDE 102 101 101 101   CO2 27 28 27 27   ANIONGAP 6 7 8 9   GLC 93 93 96 97   BUN 46* 52* 54* 56*   CR 1.57* 1.65* 1.64* 1.66*   GFRESTIMATED 46* 43* 44* 43*   GFRESTBLACK 53* 50* 51* 50*   QAMAR 8.9 8.6 8.6 8.6   MAG 2.5* 2.5* 2.5* 2.5*   PROTTOTAL  --   --  6.4*  --    ALBUMIN  --   --  3.4  --    BILITOTAL  --   --  0.7  --    ALKPHOS  --   --  108  --    AST  --   --  32  --    ALT  --   --  30  --      CBC  Recent Labs   Lab 04/26/21  0523 04/22/21  0527   WBC 3.8* 4.2   RBC 3.80* 3.90*   HGB 11.1* 11.9*   HCT 35.6* 37.2*   MCV 94 95   MCH 29.2 30.5   MCHC 31.2* 32.0   RDW 14.9 15.4*    167     INR  Recent Labs   Lab 04/28/21  0604 04/27/21  0611 04/26/21  0523 04/25/21  0632   INR 2.95* 2.74* 2.67* 2.59*       Patient discussed with Dr. Nguyen.      Soraya Marshall [facility]  4/28/2021

## 2021-04-28 NOTE — PLAN OF CARE
D-HM 2 LVAD pt admitted on 02/08/21 with worsening functional status and renal function. S/p bilateral carpal tunnel release on 02/18/21. Bilateral hand pain and swelling. Creatinine 1.65. INR 2.74.  I-Prn Robaxin, tylenol and tramadol for pain. Oxycodone was discontinued, per pt, he was not informed of this. Wearing bilateral wrist splints. Diuresing with po bumex. 8 mg coumadin today per order.  A-Pain is controlled per pt.  P-Continue with current poc. Status 2E for heart transplant. Scheduled amitriptyline and gabapentin at hs.

## 2021-04-28 NOTE — PLAN OF CARE
D: Admitted 2/08 for worsening functional status and renal function concerning for worsening heart failure. Now listed status 2E on heart transplant list.   PMHx: COPD, CKD3, DM2, NICM (EF 20%), VT/VF s/p CRT-D s/p LVAD HM2 2017 initially as destination therapy d/t obesity, but pt now eligible for transplant due to weight loss.     I: Monitored vitals and assessed pt status.   PRN: Robaxin, Tramadol, Tylenol  Tele: SR w/ 1st degree AVB and PVC's  O2: RA, home CPAP at night  Mobility: independent     A: A0x4. VSS. LVAD #'s WNL. No alarms overnight. Afebrile. Urinating adequately. Up another pound from yesterday. LBM 4/27. 3g Na diet. Q4 FSG. Do not disturb orders respected from [id number]. C/o hand and shoulder pain. Splints in place on hands. Able to make needs known.      P: Continue to monitor Pt status and report changes to Cards 2.

## 2021-04-28 NOTE — CONSULTS
Health Psychology                  Clinic    Department of Medicine  Deepika Cook, PhD, LP [phone]                          Clinics and Surgery Center  [facility] Sagrario Feng, PhD, LP [phone]                  3rd Floor  [place] [address]   Parviz Hobbs, PhD, ABPP, LP [phone]     [address] [facility],   [address] [facility],  Rajani Bejraano,  PhD, LP [phone]            [place] [address] Jennifer Bahena, PhD, LP [phone]     Inpatient Health Psychology Consultation    SUBJECTIVE:  Mr. Willingham is a 65-year-old man with significant cardiac history hospitalized awaiting heart transplant.  His wife Cate was present for this interaction per his preference.  He reported that things have been more challenging since the last check in.  This was attributed to nerve pain in hands, shoulder pain, and death of ex sister-in-law.  Discussed impact of pain and ways he is coping, primarily with use of wrist braces and amitriptyline.  He states the most difficult thing is feeling stagnant and separation from his wife and grandchild.  However, he recognizes this is necessary to prolong his life and his overall coping and tolerating this well.  Enjoys visits from his wife.  Continues to maintain the mindset that he will receive his heart in its own timeline and is excited to know he is near the top of the list.  Provided supportive interventions today with listening to ongoing stress associated with prolonged hospitalization.    OBJECTIVE:  Appearance/Behavior/Orientation: Alert and oriented to person, place, time, and situation.   Cognition/Memory/Judgment:  Not formally assessed, yet no difficulties apparent upon interview.   Speech/Language: Speech was clear, logical and coherent, of normal rate, rhythm and volume.   Mood/Affect: Mood mildly frustrated; affect was mood congruent.  Insight/Motivation:  Appropriate to situation.      ASSESSMENT:  Experiencing some depressive mood, but is expected given circumstances.  Depressed mood is not interfering with his social functioning.    DIAGNOSIS:  Adjustment disorder with depressed mood    PLAN:  Plan for health psychology to follow this patient for the duration of his hospitalization.  He and I discussed frequency of follow-up, and I informed him I might not be able to follow weekly and he is okay with this.  We will continue to follow as able.    Rajani Bejarano, PhD, LP  Clinical Health Psychologist      Start: 12:50 PM  Stop: 1:07 PM    In person visit

## 2021-04-28 NOTE — PROGRESS NOTES
D: Called patient for routine virtual VAD Coordinator rounding (r/t COVID-19). No VAD related questions or concerns at this time. Pt reports no low voltage alarms since change battery clips last week.  I: Discussed POC and provided support and listened to patient and care giver's thoughts and concerns.  P: Continue to follow patient and address any questions or concerns patient and or caregiver may have.

## 2021-04-29 ENCOUNTER — APPOINTMENT (OUTPATIENT)
Dept: OCCUPATIONAL THERAPY | Facility: CLINIC | Age: 66
DRG: 001 | End: 2021-04-29
Attending: INTERNAL MEDICINE
Payer: COMMERCIAL

## 2021-04-29 LAB
ANION GAP SERPL CALCULATED.3IONS-SCNC: 6 MMOL/L (ref 3–14)
BASOPHILS # BLD AUTO: 0.1 10E9/L (ref 0–0.2)
BASOPHILS NFR BLD AUTO: 1.9 %
BUN SERPL-MCNC: 52 MG/DL (ref 7–30)
CALCIUM SERPL-MCNC: 8.7 MG/DL (ref 8.5–10.1)
CHLORIDE SERPL-SCNC: 99 MMOL/L (ref 94–109)
CO2 SERPL-SCNC: 28 MMOL/L (ref 20–32)
CREAT SERPL-MCNC: 1.64 MG/DL (ref 0.66–1.25)
DIFFERENTIAL METHOD BLD: ABNORMAL
EOSINOPHIL # BLD AUTO: 0.3 10E9/L (ref 0–0.7)
EOSINOPHIL NFR BLD AUTO: 7.5 %
ERYTHROCYTE [DISTWIDTH] IN BLOOD BY AUTOMATED COUNT: 14.7 % (ref 10–15)
GFR SERPL CREATININE-BSD FRML MDRD: 44 ML/MIN/{1.73_M2}
GLUCOSE BLDC GLUCOMTR-MCNC: 100 MG/DL (ref 70–99)
GLUCOSE BLDC GLUCOMTR-MCNC: 109 MG/DL (ref 70–99)
GLUCOSE BLDC GLUCOMTR-MCNC: 139 MG/DL (ref 70–99)
GLUCOSE BLDC GLUCOMTR-MCNC: 47 MG/DL (ref 70–99)
GLUCOSE BLDC GLUCOMTR-MCNC: 53 MG/DL (ref 70–99)
GLUCOSE BLDC GLUCOMTR-MCNC: 62 MG/DL (ref 70–99)
GLUCOSE BLDC GLUCOMTR-MCNC: 90 MG/DL (ref 70–99)
GLUCOSE SERPL-MCNC: 83 MG/DL (ref 70–99)
HCT VFR BLD AUTO: 37 % (ref 40–53)
HGB BLD-MCNC: 11.9 G/DL (ref 13.3–17.7)
IMM GRANULOCYTES # BLD: 0 10E9/L (ref 0–0.4)
IMM GRANULOCYTES NFR BLD: 0.3 %
INR PPP: 2.94 (ref 0.86–1.14)
LYMPHOCYTES # BLD AUTO: 1.4 10E9/L (ref 0.8–5.3)
LYMPHOCYTES NFR BLD AUTO: 40 %
MAGNESIUM SERPL-MCNC: 2.5 MG/DL (ref 1.6–2.3)
MCH RBC QN AUTO: 30.7 PG (ref 26.5–33)
MCHC RBC AUTO-ENTMCNC: 32.2 G/DL (ref 31.5–36.5)
MCV RBC AUTO: 96 FL (ref 78–100)
MONOCYTES # BLD AUTO: 0.7 10E9/L (ref 0–1.3)
MONOCYTES NFR BLD AUTO: 18.6 %
NEUTROPHILS # BLD AUTO: 1.1 10E9/L (ref 1.6–8.3)
NEUTROPHILS NFR BLD AUTO: 31.7 %
PLATELET # BLD AUTO: 176 10E9/L (ref 150–450)
POTASSIUM SERPL-SCNC: 3.6 MMOL/L (ref 3.4–5.3)
RBC # BLD AUTO: 3.87 10E12/L (ref 4.4–5.9)
SA1 CELL: NORMAL
SA1 COMMENTS: NORMAL
SA1 HI RISK ABY: NORMAL
SA1 MOD RISK ABY: NORMAL
SA1 TEST METHOD: NORMAL
SA2 CELL: NORMAL
SA2 COMMENTS: NORMAL
SA2 HI RISK ABY UA: NORMAL
SA2 MOD RISK ABY: NORMAL
SA2 TEST METHOD: NORMAL
SODIUM SERPL-SCNC: 133 MMOL/L (ref 133–144)
UNACCEPTABLE ANTIGEN: NORMAL
UNOS CPRA: 0
WBC # BLD AUTO: 3.6 10E9/L (ref 4–11)

## 2021-04-29 PROCEDURE — 83735 ASSAY OF MAGNESIUM: CPT | Performed by: STUDENT IN AN ORGANIZED HEALTH CARE EDUCATION/TRAINING PROGRAM

## 2021-04-29 PROCEDURE — 250N000013 HC RX MED GY IP 250 OP 250 PS 637: Performed by: STUDENT IN AN ORGANIZED HEALTH CARE EDUCATION/TRAINING PROGRAM

## 2021-04-29 PROCEDURE — 80048 BASIC METABOLIC PNL TOTAL CA: CPT | Performed by: STUDENT IN AN ORGANIZED HEALTH CARE EDUCATION/TRAINING PROGRAM

## 2021-04-29 PROCEDURE — 250N000013 HC RX MED GY IP 250 OP 250 PS 637: Performed by: PHYSICIAN ASSISTANT

## 2021-04-29 PROCEDURE — 999N001017 HC STATISTIC GLUCOSE BY METER IP

## 2021-04-29 PROCEDURE — 258N000001 HC RX 258: Performed by: STUDENT IN AN ORGANIZED HEALTH CARE EDUCATION/TRAINING PROGRAM

## 2021-04-29 PROCEDURE — 250N000009 HC RX 250: Performed by: NURSE PRACTITIONER

## 2021-04-29 PROCEDURE — 85027 COMPLETE CBC AUTOMATED: CPT | Performed by: STUDENT IN AN ORGANIZED HEALTH CARE EDUCATION/TRAINING PROGRAM

## 2021-04-29 PROCEDURE — 85610 PROTHROMBIN TIME: CPT | Performed by: STUDENT IN AN ORGANIZED HEALTH CARE EDUCATION/TRAINING PROGRAM

## 2021-04-29 PROCEDURE — 36415 COLL VENOUS BLD VENIPUNCTURE: CPT | Performed by: STUDENT IN AN ORGANIZED HEALTH CARE EDUCATION/TRAINING PROGRAM

## 2021-04-29 PROCEDURE — 250N000013 HC RX MED GY IP 250 OP 250 PS 637: Performed by: NURSE PRACTITIONER

## 2021-04-29 PROCEDURE — 93750 INTERROGATION VAD IN PERSON: CPT | Performed by: NURSE PRACTITIONER

## 2021-04-29 PROCEDURE — 85004 AUTOMATED DIFF WBC COUNT: CPT | Performed by: NURSE PRACTITIONER

## 2021-04-29 PROCEDURE — 250N000013 HC RX MED GY IP 250 OP 250 PS 637

## 2021-04-29 PROCEDURE — 250N000013 HC RX MED GY IP 250 OP 250 PS 637: Performed by: INTERNAL MEDICINE

## 2021-04-29 PROCEDURE — 97530 THERAPEUTIC ACTIVITIES: CPT | Mod: GO | Performed by: OCCUPATIONAL THERAPIST

## 2021-04-29 PROCEDURE — 214N000001 HC R&B CCU UMMC

## 2021-04-29 PROCEDURE — 85004 AUTOMATED DIFF WBC COUNT: CPT | Performed by: STUDENT IN AN ORGANIZED HEALTH CARE EDUCATION/TRAINING PROGRAM

## 2021-04-29 PROCEDURE — 99232 SBSQ HOSP IP/OBS MODERATE 35: CPT | Mod: 25 | Performed by: NURSE PRACTITIONER

## 2021-04-29 RX ORDER — POTASSIUM CHLORIDE 750 MG/1
20 TABLET, EXTENDED RELEASE ORAL ONCE
Status: COMPLETED | OUTPATIENT
Start: 2021-04-29 | End: 2021-04-29

## 2021-04-29 RX ORDER — ACARBOSE 25 MG/1
25 TABLET ORAL 2 TIMES DAILY
Status: DISCONTINUED | OUTPATIENT
Start: 2021-04-29 | End: 2021-05-07 | Stop reason: CLARIF

## 2021-04-29 RX ORDER — WARFARIN SODIUM 7.5 MG/1
7.5 TABLET ORAL
Status: COMPLETED | OUTPATIENT
Start: 2021-04-29 | End: 2021-04-29

## 2021-04-29 RX ADMIN — CETIRIZINE HYDROCHLORIDE 10 MG: 10 TABLET, FILM COATED ORAL at 09:01

## 2021-04-29 RX ADMIN — METHOCARBAMOL 500 MG: 500 TABLET, FILM COATED ORAL at 09:01

## 2021-04-29 RX ADMIN — METHOCARBAMOL 500 MG: 500 TABLET, FILM COATED ORAL at 23:22

## 2021-04-29 RX ADMIN — POTASSIUM CHLORIDE 20 MEQ: 750 TABLET, EXTENDED RELEASE ORAL at 09:02

## 2021-04-29 RX ADMIN — HYDRALAZINE HYDROCHLORIDE 75 MG: 50 TABLET ORAL at 16:06

## 2021-04-29 RX ADMIN — DEXTROSE MONOHYDRATE 25 ML: 500 INJECTION PARENTERAL at 01:02

## 2021-04-29 RX ADMIN — BUPROPION HYDROCHLORIDE 100 MG: 100 TABLET, EXTENDED RELEASE ORAL at 09:01

## 2021-04-29 RX ADMIN — ACETAMINOPHEN 650 MG: 325 TABLET, FILM COATED ORAL at 23:22

## 2021-04-29 RX ADMIN — Medication 37.5 MG: at 22:15

## 2021-04-29 RX ADMIN — GABAPENTIN 600 MG: 300 CAPSULE ORAL at 22:15

## 2021-04-29 RX ADMIN — Medication 50 MG: at 16:38

## 2021-04-29 RX ADMIN — FLUTICASONE FUROATE AND VILANTEROL TRIFENATATE 1 PUFF: 100; 25 POWDER RESPIRATORY (INHALATION) at 09:01

## 2021-04-29 RX ADMIN — ACETAMINOPHEN 650 MG: 325 TABLET, FILM COATED ORAL at 09:01

## 2021-04-29 RX ADMIN — BUPROPION HYDROCHLORIDE 100 MG: 100 TABLET, EXTENDED RELEASE ORAL at 19:29

## 2021-04-29 RX ADMIN — METHOCARBAMOL 500 MG: 500 TABLET, FILM COATED ORAL at 16:38

## 2021-04-29 RX ADMIN — ASPIRIN 81 MG CHEWABLE TABLET 81 MG: 81 TABLET CHEWABLE at 09:06

## 2021-04-29 RX ADMIN — ACETAMINOPHEN 650 MG: 325 TABLET, FILM COATED ORAL at 16:37

## 2021-04-29 RX ADMIN — POTASSIUM CHLORIDE 40 MEQ: 750 TABLET, EXTENDED RELEASE ORAL at 09:03

## 2021-04-29 RX ADMIN — ACARBOSE 25 MG: 25 TABLET ORAL at 06:51

## 2021-04-29 RX ADMIN — BUMETANIDE 4 MG: 2 TABLET ORAL at 16:06

## 2021-04-29 RX ADMIN — ALLOPURINOL 300 MG: 300 TABLET ORAL at 09:02

## 2021-04-29 RX ADMIN — HYDRALAZINE HYDROCHLORIDE 75 MG: 50 TABLET ORAL at 19:29

## 2021-04-29 RX ADMIN — HYDRALAZINE HYDROCHLORIDE 75 MG: 50 TABLET ORAL at 09:01

## 2021-04-29 RX ADMIN — DOCUSATE SODIUM 50 MG AND SENNOSIDES 8.6 MG 1 TABLET: 8.6; 5 TABLET, FILM COATED ORAL at 19:28

## 2021-04-29 RX ADMIN — Medication 10 MG: at 23:22

## 2021-04-29 RX ADMIN — MONTELUKAST 10 MG: 10 TABLET, FILM COATED ORAL at 22:15

## 2021-04-29 RX ADMIN — ISOSORBIDE DINITRATE 10 MG: 10 TABLET ORAL at 09:03

## 2021-04-29 RX ADMIN — ANAKINRA 100 MG: 100 INJECTION, SOLUTION SUBCUTANEOUS at 08:55

## 2021-04-29 RX ADMIN — ISOSORBIDE DINITRATE 10 MG: 10 TABLET ORAL at 19:28

## 2021-04-29 RX ADMIN — Medication 50 MG: at 09:01

## 2021-04-29 RX ADMIN — GABAPENTIN 600 MG: 300 CAPSULE ORAL at 16:06

## 2021-04-29 RX ADMIN — Medication 50 MG: at 23:22

## 2021-04-29 RX ADMIN — BUMETANIDE 4 MG: 2 TABLET ORAL at 09:02

## 2021-04-29 RX ADMIN — POTASSIUM CHLORIDE 40 MEQ: 750 TABLET, EXTENDED RELEASE ORAL at 19:28

## 2021-04-29 RX ADMIN — WARFARIN SODIUM 7.5 MG: 7.5 TABLET ORAL at 19:28

## 2021-04-29 RX ADMIN — GABAPENTIN 300 MG: 300 CAPSULE ORAL at 09:02

## 2021-04-29 RX ADMIN — UMECLIDINIUM 1 PUFF: 62.5 AEROSOL, POWDER ORAL at 09:01

## 2021-04-29 RX ADMIN — AMIODARONE HYDROCHLORIDE 200 MG: 200 TABLET ORAL at 09:02

## 2021-04-29 RX ADMIN — ISOSORBIDE DINITRATE 10 MG: 10 TABLET ORAL at 16:07

## 2021-04-29 RX ADMIN — ACARBOSE 25 MG: 25 TABLET ORAL at 19:29

## 2021-04-29 ASSESSMENT — ACTIVITIES OF DAILY LIVING (ADL)
ADLS_ACUITY_SCORE: 12

## 2021-04-29 ASSESSMENT — MIFFLIN-ST. JEOR: SCORE: 1792.81

## 2021-04-29 NOTE — PLAN OF CARE
Presents for worsening dyspnea on exertion, lethargy. Decompensated heart failure. Currently here for heart txp waitlist status 2E. Pmhx:  NICM EF 20% c/b VT s/p CRT-D s/p HMII LVAD 2017 originally intended as destination therapy 2/2 obesity, recent weight loss, afib s/p DCCV on amiodarone and warfarin, staph hominis bacteremia, CKD3, COPD    Code status: Full     Team: cards 2  PRN: Tramadol             Robaxin             Tylenol     Neuro: ao*4  Cardiac/Tele:  SR w/ 1*AVB w/ BBB, LVAD #'s wnl  Respiratory: room air  GI/: urinating via urinal/toliet  Diet/Appetite: 3g na   Skin: LVAD dressing CDI and changed  Endocrine: ACHS  LDAs: PIV SL  Activity: up ad francisco  Pain: wrist, hand, shoulder     Nuris Darling, RN  Time cared for 0700 - 2330

## 2021-04-29 NOTE — PROVIDER NOTIFICATION
HS glucose 264 at 2123. Pt called at 2300 reporting signs of hypoglycemia and asked for BG to be checked. Reading was 47. Rechecked on opposite hand with reading of 62. Values not crossing into Epic and charted in flowsheets. Gave pt 2 cups of apple juice with 2 packets of sugar. Recheck  at 2345. Pt called again at 0050 complaining of the same symptoms. Checked BG and it was 53. Alerted provider and treated with dextrose. Recheck at 0124 was 109. Will continue to monitor patient.

## 2021-04-29 NOTE — PROGRESS NOTES
Plastic Surgery Progress Note     Subjective:  Reports b/l hands feeling better. Still has numbness at tip of R thumb.    Objective:  Temp:  [97.5  F (36.4  C)-98.3  F (36.8  C)] 97.5  F (36.4  C)  Pulse:  [68-76] 75  Resp:  [16] 16  BP: ()/(56-99) 108/99  SpO2:  [97 %-99 %] 99 %  I/O last 3 completed shifts:  In: 1440 [P.O.:1440]  Out: 3625 [Urine:3625]    Gen: NAD  Resp: NLB  B/l hands in splints, no swelling throughout, stable/mildly improved sensation at fingertips      A/P: Jim Willingham is a 65 year old male PMH significant for heart failure, CKD, T2DM, s/p b/l CTR 2/18/21 uncomplicated, now c/o burning pain & increased sensitivity to median n fingers. Improving w/ therapy & medications, using splints.  - Continue hand therapy for nerve gliding & desensitization (ordered)  - Continue leah & amitriptyline  - Can continue w/ lido gel as this seems to help  - Plastics will follow      Cate Hathaway MD  Plastic Surgery PGY4

## 2021-04-29 NOTE — PLAN OF CARE
Pt admitted 2/8 for worsening functional status and renal function concerning for worsening heart failure. Now listed status 2E for heart transplant. PMH includes COPD, CKD, DM2, NICM, VT s/p CRT-D s/p LVAD HM2 (2017).    Neuro: A&O x 4. Calls appropriately. Pleasant affect. Denies headache.   Cardiac: SR with 1st degree AV block in the 70's. LVAD numbers WNL. Denies dizziness, chest pain, or palpitations.  Respiratory: Sating well on room air. CPAP worn overnight.   GI/: Good UOP. Last BM 4/27. Denies nausea.  Endocrine: Difficulty controlling blood sugars overnight. See separate note.   Diet/Appetite: 3G Na diet.  Skin: WNL  LDA: R PIV SL  Activity: Up ad francisco  Pain: Moderate pain in wrists and hands controlled by PRN Robaxin, Tylenol, and Tramadol.     Plan: Continue to monitor and alert team with any changes or concerns.

## 2021-04-29 NOTE — PROGRESS NOTES
[facility]   Cardiology II Service / Advanced Heart Failure  Daily Progress Note  Date of Service: 4/29/2021      Patient: Jim Willingham  MRN: [id number]  Admission Date: 2/8/2021  Hospital Day # 80    Assessment and Plan: Jim Willingham is a 65 year old male with history of NICM EF 20% c/b VT s/p CRT-D s/p HMII LVAD 6/19/2017 originally intended as destination therapy 2/2 obesity however with recent weight loss now candidate for transplantation. He is admitted for worsening functional status and renal function concerning for worsening heart failure. He is now listed status 2E for transplant.     Chronic systolic heart failure secondary to NICM s/p HM II LVAD. Echo 1/8/21 at 9400 rpm, EF<30%m LVIDd 6.8cm, at least mildly reduced RV function, AoV closed without AI, mild-mod eccentric MR, dilated IVC without collapse. RHC 3/23 RA 10 mPA 25 mPCWP 14 Kee CO/CI 6.5/3.1.  Stage D, NYHA Class III  ACEi/ARB contraindicated due to renal dysfunction. Isordil 10 mg po TID. Hydralazine 75 mg po TID.   BB Contraindicated due to low cardiac output.   Aldosterone antagonist contraindicated due to renal dysfunction  SCD prophylaxis CRT-D  Fluid status: Near euvolemic state. Bumex 4 mg po BID.   MAP: 80   LDH: 319 4/23  Anticoagulation: Coumadin per pharmacy. INR- 2.94, goal 2.5-3, goal increased in setting of power spikes and elevated flows this hospitalization.    Antiplatelet: ASA 81 mg po daily  - remains on the cardiac transplant list status 2E. Persistent escalation of diuretics in setting of progressive heart failure with refractory hypervolemia.      The patient's HeartMate LVAD was interrogated 4/29/2021  * Speed 9600 rpm   * Pulsatility index 4.4  * Power 6.5 Manuel   * Flow 6.1 L/minute   Fluid status: Near euvolemic state   Alarms were reviewed, and notable for PI events. One low voltage alarm.   All external components were inspected and showed no evidence of damage or malfunction.     WALTER on CKD Stage  III. Cr peaked at 2.22.  - Cr- 1.64 (1.57).      History of Afib. History of VT/VF. Asymptomatic bursts of questionable AF vs NSVT without hemodynamic compromise. RHC 3/23 RA 10 mPA 25 mPCWP 14 Kee CO/CI 6.5/3.1. CHADSVASC-5.   - Continue Amiodarone 200 mg po daily.   - Coumadin as above.      DM type II, controlled. Symptomatic Hypoglycemia, resolved. Low Cortisol: Hgb A1C-6.2. Lantus and Novololg sliding scale insulin discontinued. Endo consult appreciated. C-peptide 13.9 an Proinsulin 18.4. Serum cortisol 7.7 3/6/21, cosyntropin stim test WNL per Endo.   -Per endo --> Fingerstick glucose might not be accurate for hypoglycemia, if patient has glucose <55 (without insulin), plasma glucose should be sent for confirmation of hypoglycemia prior to correction. While the patient is off insulin therapy, POC glucose readings above 60 are acceptable for the patient  - postprandial hypoglycemia this AM. Endo notified, continue Acarbose 25 mg po in AM add evening dose given overnight hypoglycemia.      Carpel tunnel, bilateral s/p bilateral release. Evidence of thenar atrophy. Relief with steroid injection 11/20. Carpal tunnel release 2/18/21. Appreciate Ortho/Plastics consult, signed off. Continue Tylenol 650 mg po QID and Gabapentin 300 mg in AM, 600 mg in the afternoon, and 600 mg in the evening.   - Lidocaine gel prn.   - Appreciate Plastic surgery consult, signed off.   - Tramadol prn.  - Elavil increased to 37.5 mg po at HS.  - Hand braces and therapy consult appreciated.     Neutropenia.   - WBC trending down at 3.6 (3.8). Diff with ANC of 1.1.   - Will discuss Anakinra with Rheumatology.      Chronic/resolved conditions  COPD/Asthma: pta Breo Ellipta, albuterol prn.   Depression: pta Bupropion  Right sided weakness, resolved. CT head negative for acute findings. Appreciate Neuro eval.   Staph Hominis Bacteremia. No need for surveillance blood cx per transplant ID.    Rhinovirus, resolved. Bacterial  "bronchitis. Completed 5 day course of Cefdinir. COVID negative 2/8. Resp PCR +rhinovirus. CXR 2/12 with no overt PNA. Stopped cefdinir 2/15. Cleared for transplant per ID. Repeat RVP negative 3/5. COVID repeated due to sinus congestion, negative. Congestion improved with transition from Claritin to Zyrtec.      FEN: 3 gram sodium diet   PROPHY:  Coumadin  LINES:  PIV   DISPO:  TBD pending cardiac transplant.   CODE STATUS: Full Code   ================================================================  Interval History/ROS: He notes improvement to bilateral hand pain. He notes fatigue this AM after low BG last HS into the 40's. He denies fever, chills, chest pain, palpitations, cough, nausea, vomiting, diarrhea, melena, hematochezia, and LE edema. He is tolerating oral intake and ambulating today.     Last 24 hr care team notes reviewed.   ROS:  4 point ROS including Respiratory, CV, GI and , other than that noted in the HPI, is negative.     Medications: Reviewed in EPIC.     Physical Exam:   BP (!) 87/76 (BP Location: Right arm)   Pulse 71   Temp 98  F (36.7  C) (Oral)   Resp 16   Ht 1.727 m (5' 8\")   Wt 102.3 kg (225 lb 9.6 oz)   SpO2 97%   BMI 34.30 kg/m    GENERAL: Appears alert and oriented times three.   HEENT: Eye symmetrical and free of discharge bilaterally. Mucous membranes moist and without lesions.  NECK: Supple and without lymphadenopathy. JVD 8 cm .   CV: RRR, S1S2 present with LVAD hum.   RESPIRATORY: Respirations regular, even, and unlabored. Lungs CTA throughout.   GI: Soft and non distended with normoactive bowel sounds present in all quadrants. No tenderness, rebound, guarding. No organomegaly.   EXTREMITIES: Trace bilateral LE peripheral edema. 2+ bilateral pedal pulses.   NEUROLOGIC: Alert and orientated x 3. CN II-XII grossly intact. No focal deficits.   MUSCULOSKELETAL: No joint swelling or tenderness.   SKIN: No jaundice. No rashes or lesions. LVAD drive line covered. "     Data:  CMP  Recent Labs   Lab 04/29/21  0555 04/28/21  1753 04/28/21  0604 04/27/21  0611 04/26/21  0523    133 136 136 135   POTASSIUM 3.6 3.8 4.1 3.8 3.5   CHLORIDE 99 100 102 101 101   CO2 28 28 27 28 27   ANIONGAP 6 4 6 7 8   GLC 83 86 93 93 96   BUN 52* 51* 46* 52* 54*   CR 1.64* 1.69* 1.57* 1.65* 1.64*   GFRESTIMATED 44* 42* 46* 43* 44*   GFRESTBLACK 51* 49* 53* 50* 51*   QAMAR 8.7 8.8 8.9 8.6 8.6   MAG 2.5*  --  2.5* 2.5* 2.5*   PROTTOTAL  --   --   --   --  6.4*   ALBUMIN  --   --   --   --  3.4   BILITOTAL  --   --   --   --  0.7   ALKPHOS  --   --   --   --  108   AST  --   --   --   --  32   ALT  --   --   --   --  30     CBC  Recent Labs   Lab 04/29/21  0555 04/26/21  0523   WBC 3.6* 3.8*   RBC 3.87* 3.80*   HGB 11.9* 11.1*   HCT 37.0* 35.6*   MCV 96 94   MCH 30.7 29.2   MCHC 32.2 31.2*   RDW 14.7 14.9    171     INR  Recent Labs   Lab 04/29/21  0555 04/28/21  0604 04/27/21  0611 04/26/21  0523   INR 2.94* 2.95* 2.74* 2.67*       Patient discussed with Dr. Nguyen.      Soraya Marshall FN  4/29/2021

## 2021-04-30 ENCOUNTER — APPOINTMENT (OUTPATIENT)
Dept: PHYSICAL THERAPY | Facility: CLINIC | Age: 66
DRG: 001 | End: 2021-04-30
Attending: INTERNAL MEDICINE
Payer: COMMERCIAL

## 2021-04-30 LAB
ANION GAP SERPL CALCULATED.3IONS-SCNC: 6 MMOL/L (ref 3–14)
BASOPHILS # BLD AUTO: 0.1 10E9/L (ref 0–0.2)
BASOPHILS NFR BLD AUTO: 1.9 %
BUN SERPL-MCNC: 55 MG/DL (ref 7–30)
CALCIUM SERPL-MCNC: 8.4 MG/DL (ref 8.5–10.1)
CHLORIDE SERPL-SCNC: 101 MMOL/L (ref 94–109)
CO2 SERPL-SCNC: 28 MMOL/L (ref 20–32)
CREAT SERPL-MCNC: 1.7 MG/DL (ref 0.66–1.25)
DIFFERENTIAL METHOD BLD: ABNORMAL
EOSINOPHIL # BLD AUTO: 0.2 10E9/L (ref 0–0.7)
EOSINOPHIL NFR BLD AUTO: 5.5 %
ERYTHROCYTE [DISTWIDTH] IN BLOOD BY AUTOMATED COUNT: 14.8 % (ref 10–15)
GFR SERPL CREATININE-BSD FRML MDRD: 42 ML/MIN/{1.73_M2}
GLUCOSE BLDC GLUCOMTR-MCNC: 129 MG/DL (ref 70–99)
GLUCOSE BLDC GLUCOMTR-MCNC: 158 MG/DL (ref 70–99)
GLUCOSE BLDC GLUCOMTR-MCNC: 94 MG/DL (ref 70–99)
GLUCOSE SERPL-MCNC: 94 MG/DL (ref 70–99)
HCT VFR BLD AUTO: 36.2 % (ref 40–53)
HGB BLD-MCNC: 11.2 G/DL (ref 13.3–17.7)
IMM GRANULOCYTES # BLD: 0 10E9/L (ref 0–0.4)
IMM GRANULOCYTES NFR BLD: 0.2 %
INR PPP: 2.95 (ref 0.86–1.14)
LDH SERPL L TO P-CCNC: 317 U/L (ref 85–227)
LYMPHOCYTES # BLD AUTO: 1.9 10E9/L (ref 0.8–5.3)
LYMPHOCYTES NFR BLD AUTO: 44.4 %
MAGNESIUM SERPL-MCNC: 2.6 MG/DL (ref 1.6–2.3)
MCH RBC QN AUTO: 29.4 PG (ref 26.5–33)
MCHC RBC AUTO-ENTMCNC: 30.9 G/DL (ref 31.5–36.5)
MCV RBC AUTO: 95 FL (ref 78–100)
MONOCYTES # BLD AUTO: 0.6 10E9/L (ref 0–1.3)
MONOCYTES NFR BLD AUTO: 15.3 %
NEUTROPHILS # BLD AUTO: 1.4 10E9/L (ref 1.6–8.3)
NEUTROPHILS NFR BLD AUTO: 32.7 %
NRBC # BLD AUTO: 0 10*3/UL
NRBC BLD AUTO-RTO: 0 /100
PLATELET # BLD AUTO: 173 10E9/L (ref 150–450)
POTASSIUM SERPL-SCNC: 3.9 MMOL/L (ref 3.4–5.3)
RBC # BLD AUTO: 3.81 10E12/L (ref 4.4–5.9)
SODIUM SERPL-SCNC: 135 MMOL/L (ref 133–144)
WBC # BLD AUTO: 4.2 10E9/L (ref 4–11)

## 2021-04-30 PROCEDURE — 250N000013 HC RX MED GY IP 250 OP 250 PS 637: Performed by: INTERNAL MEDICINE

## 2021-04-30 PROCEDURE — 250N000013 HC RX MED GY IP 250 OP 250 PS 637: Performed by: PHYSICIAN ASSISTANT

## 2021-04-30 PROCEDURE — 80048 BASIC METABOLIC PNL TOTAL CA: CPT | Performed by: STUDENT IN AN ORGANIZED HEALTH CARE EDUCATION/TRAINING PROGRAM

## 2021-04-30 PROCEDURE — 83615 LACTATE (LD) (LDH) ENZYME: CPT | Performed by: STUDENT IN AN ORGANIZED HEALTH CARE EDUCATION/TRAINING PROGRAM

## 2021-04-30 PROCEDURE — 83735 ASSAY OF MAGNESIUM: CPT | Performed by: STUDENT IN AN ORGANIZED HEALTH CARE EDUCATION/TRAINING PROGRAM

## 2021-04-30 PROCEDURE — 99232 SBSQ HOSP IP/OBS MODERATE 35: CPT | Mod: 25 | Performed by: NURSE PRACTITIONER

## 2021-04-30 PROCEDURE — 85610 PROTHROMBIN TIME: CPT | Performed by: STUDENT IN AN ORGANIZED HEALTH CARE EDUCATION/TRAINING PROGRAM

## 2021-04-30 PROCEDURE — 250N000013 HC RX MED GY IP 250 OP 250 PS 637: Performed by: NURSE PRACTITIONER

## 2021-04-30 PROCEDURE — 250N000013 HC RX MED GY IP 250 OP 250 PS 637: Performed by: STUDENT IN AN ORGANIZED HEALTH CARE EDUCATION/TRAINING PROGRAM

## 2021-04-30 PROCEDURE — 85025 COMPLETE CBC W/AUTO DIFF WBC: CPT | Performed by: STUDENT IN AN ORGANIZED HEALTH CARE EDUCATION/TRAINING PROGRAM

## 2021-04-30 PROCEDURE — 214N000001 HC R&B CCU UMMC

## 2021-04-30 PROCEDURE — 999N001017 HC STATISTIC GLUCOSE BY METER IP

## 2021-04-30 PROCEDURE — 36415 COLL VENOUS BLD VENIPUNCTURE: CPT | Performed by: STUDENT IN AN ORGANIZED HEALTH CARE EDUCATION/TRAINING PROGRAM

## 2021-04-30 PROCEDURE — 93750 INTERROGATION VAD IN PERSON: CPT | Performed by: NURSE PRACTITIONER

## 2021-04-30 RX ADMIN — ISOSORBIDE DINITRATE 10 MG: 10 TABLET ORAL at 14:33

## 2021-04-30 RX ADMIN — BUMETANIDE 4 MG: 2 TABLET ORAL at 16:23

## 2021-04-30 RX ADMIN — BUMETANIDE 4 MG: 2 TABLET ORAL at 09:12

## 2021-04-30 RX ADMIN — HYDRALAZINE HYDROCHLORIDE 75 MG: 50 TABLET ORAL at 20:20

## 2021-04-30 RX ADMIN — UMECLIDINIUM 1 PUFF: 62.5 AEROSOL, POWDER ORAL at 09:18

## 2021-04-30 RX ADMIN — Medication 50 MG: at 16:35

## 2021-04-30 RX ADMIN — METHOCARBAMOL 500 MG: 500 TABLET, FILM COATED ORAL at 08:54

## 2021-04-30 RX ADMIN — MONTELUKAST 10 MG: 10 TABLET, FILM COATED ORAL at 22:14

## 2021-04-30 RX ADMIN — ALLOPURINOL 300 MG: 300 TABLET ORAL at 09:12

## 2021-04-30 RX ADMIN — POTASSIUM CHLORIDE 40 MEQ: 750 TABLET, EXTENDED RELEASE ORAL at 09:11

## 2021-04-30 RX ADMIN — AMIODARONE HYDROCHLORIDE 200 MG: 200 TABLET ORAL at 09:12

## 2021-04-30 RX ADMIN — ISOSORBIDE DINITRATE 10 MG: 10 TABLET ORAL at 09:12

## 2021-04-30 RX ADMIN — METHOCARBAMOL 500 MG: 500 TABLET, FILM COATED ORAL at 22:14

## 2021-04-30 RX ADMIN — GABAPENTIN 600 MG: 300 CAPSULE ORAL at 14:33

## 2021-04-30 RX ADMIN — WARFARIN SODIUM 7 MG: 4 TABLET ORAL at 18:18

## 2021-04-30 RX ADMIN — BUPROPION HYDROCHLORIDE 100 MG: 100 TABLET, EXTENDED RELEASE ORAL at 09:12

## 2021-04-30 RX ADMIN — METHOCARBAMOL 500 MG: 500 TABLET, FILM COATED ORAL at 16:35

## 2021-04-30 RX ADMIN — FLUTICASONE FUROATE AND VILANTEROL TRIFENATATE 1 PUFF: 100; 25 POWDER RESPIRATORY (INHALATION) at 09:18

## 2021-04-30 RX ADMIN — ACETAMINOPHEN 650 MG: 325 TABLET, FILM COATED ORAL at 16:35

## 2021-04-30 RX ADMIN — BUPROPION HYDROCHLORIDE 100 MG: 100 TABLET, EXTENDED RELEASE ORAL at 20:20

## 2021-04-30 RX ADMIN — GABAPENTIN 600 MG: 300 CAPSULE ORAL at 22:14

## 2021-04-30 RX ADMIN — ISOSORBIDE DINITRATE 10 MG: 10 TABLET ORAL at 20:20

## 2021-04-30 RX ADMIN — CETIRIZINE HYDROCHLORIDE 10 MG: 10 TABLET, FILM COATED ORAL at 09:12

## 2021-04-30 RX ADMIN — POTASSIUM CHLORIDE 40 MEQ: 750 TABLET, EXTENDED RELEASE ORAL at 20:20

## 2021-04-30 RX ADMIN — Medication 37.5 MG: at 22:00

## 2021-04-30 RX ADMIN — ACARBOSE 25 MG: 25 TABLET ORAL at 18:18

## 2021-04-30 RX ADMIN — ACETAMINOPHEN 650 MG: 325 TABLET, FILM COATED ORAL at 08:54

## 2021-04-30 RX ADMIN — HYDRALAZINE HYDROCHLORIDE 75 MG: 50 TABLET ORAL at 14:33

## 2021-04-30 RX ADMIN — Medication 50 MG: at 22:14

## 2021-04-30 RX ADMIN — ACARBOSE 25 MG: 25 TABLET ORAL at 09:12

## 2021-04-30 RX ADMIN — ACETAMINOPHEN 650 MG: 325 TABLET, FILM COATED ORAL at 22:14

## 2021-04-30 RX ADMIN — Medication 50 MG: at 08:54

## 2021-04-30 RX ADMIN — ASPIRIN 81 MG CHEWABLE TABLET 81 MG: 81 TABLET CHEWABLE at 09:12

## 2021-04-30 RX ADMIN — DOCUSATE SODIUM 50 MG AND SENNOSIDES 8.6 MG 2 TABLET: 8.6; 5 TABLET, FILM COATED ORAL at 20:20

## 2021-04-30 RX ADMIN — GABAPENTIN 300 MG: 300 CAPSULE ORAL at 09:12

## 2021-04-30 RX ADMIN — HYDRALAZINE HYDROCHLORIDE 75 MG: 50 TABLET ORAL at 09:12

## 2021-04-30 ASSESSMENT — ACTIVITIES OF DAILY LIVING (ADL)
ADLS_ACUITY_SCORE: 12

## 2021-04-30 ASSESSMENT — MIFFLIN-ST. JEOR: SCORE: 1778.75

## 2021-04-30 NOTE — PLAN OF CARE
D: Pt heart transplant status 2E. Pt stable and comfortable. AVSS, LVAD #s WNL. No shortness of breath noted. Bilateral wrist pain and R shoulder pain relieved with PRN tylenol, robaxin and tramadol.   I: Monitored/assessed pt. Assisted with cares.  A: Pt stable and comfortable.  P: Continue to monitor/assess pt, contact provider with concerns.

## 2021-04-30 NOTE — PROGRESS NOTES
[facility]   Cardiology II Service / Advanced Heart Failure  Daily Progress Note  Date of Service: 4/30/2021      Patient: Jim Willingham  MRN: [id number]  Admission Date: 2/8/2021  Hospital Day # 81    Assessment and Plan: Jim Willingham is a 65 year old male with history of NICM EF 20% c/b VT s/p CRT-D s/p HMII LVAD 6/19/2017 originally intended as destination therapy 2/2 obesity however with recent weight loss now candidate for transplantation. He is admitted for worsening functional status and renal function concerning for worsening heart failure. He is now listed status 2E for transplant.    Plan today:   - Anakinra decreased to every other day.   - CBC with diff MWF.      Chronic systolic heart failure secondary to NICM s/p HM II LVAD. Echo 1/8/21 at 9400 rpm, EF<30%m LVIDd 6.8cm, at least mildly reduced RV function, AoV closed without AI, mild-mod eccentric MR, dilated IVC without collapse. RHC 3/23 RA 10 mPA 25 mPCWP 14 Kee CO/CI 6.5/3.1.   Stage D, NYHA Class III  ACEi/ARB contraindicated due to renal dysfunction. Isordil 10 mg po TID. Hydralazine 75 mg po TID.   BB Contraindicated due to low cardiac output.   Aldosterone antagonist contraindicated due to renal dysfunction  SCD prophylaxis CRT-D  Fluid status: Near euvolemic state. Bumex 4 mg po BID.   MAP: 75  LDH: 317  Anticoagulation: Coumadin per pharmacy. INR- 2.95, goal 2.5-3, goal increased in setting of power spikes and elevated flows this hospitalization.    Antiplatelet: ASA 81 mg po daily  - remains on the cardiac transplant list status 2E. Persistent escalation of diuretics in setting of progressive heart failure with refractory hypervolemia.   - isolated recurrent low voltage, communicated to VAD coordinators. Monitor at this time, if frequency increases will need to consider sending log files.      The patient's HeartMate LVAD was interrogated 4/30/2021  * Speed 9600 rpm   * Pulsatility index 5.2  * Power 6.2 Manuel   *  Flow 5.7 L/minute   Fluid status: Near euvolemic state   Alarms were reviewed, and notable for PI events. Isolated voltage alarm.    All external components were inspected and showed no evidence of damage or malfunction.     WALTER on CKD Stage III. Cr peaked at 2.22.  - Cr- 1.70 (1.64).     Neutropenia.   - WBC trending down at 4.2 (3.6). Diff with ANC of 1.4.  - Anakinra decreased to every other day. Discussed with Rheumatology.      DM type II, controlled. Symptomatic Hypoglycemia, resolved. Low Cortisol: Hgb A1C-6.2. Lantus and Novololg sliding scale insulin discontinued. Endo consult appreciated. C-peptide 13.9 an Proinsulin 18.4. Serum cortisol 7.7 3/6/21, cosyntropin stim test WNL per Endo.   -Per endo --> Fingerstick glucose might not be accurate for hypoglycemia, if patient has glucose <55 (without insulin), plasma glucose should be sent for confirmation of hypoglycemia prior to correction. While the patient is off insulin therapy, POC glucose readings above 60 are acceptable for the patient  - postprandial hypoglycemia this AM. Endo notified, continue Acarbose 25 mg po in AM add HS dose given overnight hypoglycemia.      Carpel tunnel, bilateral s/p bilateral release. Evidence of thenar atrophy. Relief with steroid injection 11/20. Carpal tunnel release 2/18/21. Appreciate Ortho/Plastics consult, signed off. Continue Tylenol 650 mg po QID and Gabapentin 300 mg in AM, 600 mg in the afternoon, and 600 mg in the evening.   - Lidocaine gel prn.   - Appreciate Plastic surgery consult, signed off.   - Tramadol prn.  - Elavil 37.5 mg po at HS.   - Hand braces and therapy consult appreciated.      Chronic/resolved conditions  COPD/Asthma: pta Breo Ellipta, albuterol prn.   Depression: pta Bupropion  Right sided weakness, resolved. CT head negative for acute findings. Appreciate Neuro eval.   Staph Hominis Bacteremia. No need for surveillance blood cx per transplant ID.    Rhinovirus, resolved. Bacterial  "bronchitis. Completed 5 day course of Cefdinir. COVID negative 2/8. Resp PCR +rhinovirus. CXR 2/12 with no overt PNA. Stopped cefdinir 2/15. Cleared for transplant per ID. Repeat RVP negative 3/5. COVID repeated due to sinus congestion, negative. Congestion improved with transition from Claritin to Zyrtec.   History of Afib. History of VT/VF. Asymptomatic bursts of questionable AF vs NSVT without hemodynamic compromise. RHC 3/23 RA 10 mPA 25 mPCWP 14 Kee CO/CI 6.5/3.1. CHADSVASC-5. Continue Amiodarone 200 mg po daily.   - Coumadin as above.      FEN: 3 gram sodium diet   PROPHY:  Coumadin  LINES:  PIV   DISPO:  TBD pending cardiac transplant.   CODE STATUS: Full Code   ================================================================  Interval History/ROS: He notes improvement in hand pain. He denies fever, chills, chest pain, palpitations, cough, nausea, vomiting, diarrhea, melena, hematochezia, and concerns at his drive line site. He notes mild LE edema. He is tolerating oral intake and ambulating independently.     Last 24 hr care team notes reviewed.   ROS:  4 point ROS including Respiratory, CV, GI and , other than that noted in the HPI, is negative.     Medications: Reviewed in EPIC.     Physical Exam:   BP (!) 89/60   Pulse 72   Temp 98.2  F (36.8  C) (Oral)   Resp 18   Ht 1.727 m (5' 8\")   Wt 100.9 kg (222 lb 8 oz)   SpO2 95%   BMI 33.83 kg/m    GENERAL: Appears alert and oriented times three.   HEENT: Eye symmetrical and free of discharge bilaterally. Mucous membranes moist and without lesions.  NECK: Supple and without lymphadenopathy. JVD 8 cm.   CV: RRR, S1S2 present with LVAD hum.   RESPIRATORY: Respirations regular, even, and unlabored. Lungs CTA throughout.   GI: Soft and non distended with normoactive bowel sounds present in all quadrants. No tenderness, rebound, guarding. No organomegaly.   EXTREMITIES: Trace bilateral LE peripheral edema. 2+ bilateral pedal pulses.   NEUROLOGIC: Alert and " orientated x 3. CN II-XII grossly intact. No focal deficits.   MUSCULOSKELETAL: No joint swelling or tenderness.   SKIN: No jaundice. No rashes or lesions. LVAD drive line covered.     Data:  CMP  Recent Labs   Lab 04/30/21 0514 04/29/21  0555 04/28/21  1753 04/28/21  0604 04/27/21  0611 04/26/21  0523    133 133 136 136 135   POTASSIUM 3.9 3.6 3.8 4.1 3.8 3.5   CHLORIDE 101 99 100 102 101 101   CO2 28 28 28 27 28 27   ANIONGAP 6 6 4 6 7 8   GLC 94 83 86 93 93 96   BUN 55* 52* 51* 46* 52* 54*   CR 1.70* 1.64* 1.69* 1.57* 1.65* 1.64*   GFRESTIMATED 42* 44* 42* 46* 43* 44*   GFRESTBLACK 48* 51* 49* 53* 50* 51*   QAMAR 8.4* 8.7 8.8 8.9 8.6 8.6   MAG 2.6* 2.5*  --  2.5* 2.5* 2.5*   PROTTOTAL  --   --   --   --   --  6.4*   ALBUMIN  --   --   --   --   --  3.4   BILITOTAL  --   --   --   --   --  0.7   ALKPHOS  --   --   --   --   --  108   AST  --   --   --   --   --  32   ALT  --   --   --   --   --  30     CBC  Recent Labs   Lab 04/30/21 0514 04/29/21  0555 04/26/21  0523   WBC 4.2 3.6* 3.8*   RBC 3.81* 3.87* 3.80*   HGB 11.2* 11.9* 11.1*   HCT 36.2* 37.0* 35.6*   MCV 95 96 94   MCH 29.4 30.7 29.2   MCHC 30.9* 32.2 31.2*   RDW 14.8 14.7 14.9    176 171     INR  Recent Labs   Lab 04/30/21  0514 04/29/21  0555 04/28/21  0604 04/27/21  0611   INR 2.95* 2.94* 2.95* 2.74*       Patient discussed with Dr. Mccarty.      Soraya Marshall [facility]  4/30/2021

## 2021-05-01 LAB
ANION GAP SERPL CALCULATED.3IONS-SCNC: 6 MMOL/L (ref 3–14)
BUN SERPL-MCNC: 52 MG/DL (ref 7–30)
CALCIUM SERPL-MCNC: 8.5 MG/DL (ref 8.5–10.1)
CHLORIDE SERPL-SCNC: 103 MMOL/L (ref 94–109)
CO2 SERPL-SCNC: 28 MMOL/L (ref 20–32)
CREAT SERPL-MCNC: 1.53 MG/DL (ref 0.66–1.25)
GFR SERPL CREATININE-BSD FRML MDRD: 47 ML/MIN/{1.73_M2}
GLUCOSE BLDC GLUCOMTR-MCNC: 108 MG/DL (ref 70–99)
GLUCOSE BLDC GLUCOMTR-MCNC: 114 MG/DL (ref 70–99)
GLUCOSE BLDC GLUCOMTR-MCNC: 143 MG/DL (ref 70–99)
GLUCOSE BLDC GLUCOMTR-MCNC: 76 MG/DL (ref 70–99)
GLUCOSE SERPL-MCNC: 90 MG/DL (ref 70–99)
INR PPP: 2.84 (ref 0.86–1.14)
MAGNESIUM SERPL-MCNC: 2.5 MG/DL (ref 1.6–2.3)
POTASSIUM SERPL-SCNC: 4 MMOL/L (ref 3.4–5.3)
SODIUM SERPL-SCNC: 136 MMOL/L (ref 133–144)

## 2021-05-01 PROCEDURE — 250N000013 HC RX MED GY IP 250 OP 250 PS 637: Performed by: NURSE PRACTITIONER

## 2021-05-01 PROCEDURE — 83735 ASSAY OF MAGNESIUM: CPT | Performed by: STUDENT IN AN ORGANIZED HEALTH CARE EDUCATION/TRAINING PROGRAM

## 2021-05-01 PROCEDURE — 250N000013 HC RX MED GY IP 250 OP 250 PS 637: Performed by: INTERNAL MEDICINE

## 2021-05-01 PROCEDURE — 93750 INTERROGATION VAD IN PERSON: CPT | Performed by: NURSE PRACTITIONER

## 2021-05-01 PROCEDURE — 99232 SBSQ HOSP IP/OBS MODERATE 35: CPT | Mod: 25 | Performed by: NURSE PRACTITIONER

## 2021-05-01 PROCEDURE — 214N000001 HC R&B CCU UMMC

## 2021-05-01 PROCEDURE — 250N000013 HC RX MED GY IP 250 OP 250 PS 637: Performed by: PHYSICIAN ASSISTANT

## 2021-05-01 PROCEDURE — 36415 COLL VENOUS BLD VENIPUNCTURE: CPT | Performed by: STUDENT IN AN ORGANIZED HEALTH CARE EDUCATION/TRAINING PROGRAM

## 2021-05-01 PROCEDURE — 80048 BASIC METABOLIC PNL TOTAL CA: CPT | Performed by: STUDENT IN AN ORGANIZED HEALTH CARE EDUCATION/TRAINING PROGRAM

## 2021-05-01 PROCEDURE — 250N000009 HC RX 250: Performed by: NURSE PRACTITIONER

## 2021-05-01 PROCEDURE — 999N001017 HC STATISTIC GLUCOSE BY METER IP

## 2021-05-01 PROCEDURE — 250N000013 HC RX MED GY IP 250 OP 250 PS 637: Performed by: STUDENT IN AN ORGANIZED HEALTH CARE EDUCATION/TRAINING PROGRAM

## 2021-05-01 PROCEDURE — 85610 PROTHROMBIN TIME: CPT | Performed by: STUDENT IN AN ORGANIZED HEALTH CARE EDUCATION/TRAINING PROGRAM

## 2021-05-01 RX ORDER — METHOCARBAMOL 500 MG/1
500 TABLET, FILM COATED ORAL EVERY 4 HOURS PRN
Status: CANCELLED | OUTPATIENT
Start: 2021-05-01

## 2021-05-01 RX ORDER — CYCLOBENZAPRINE HCL 5 MG
5 TABLET ORAL EVERY 8 HOURS PRN
Status: DISCONTINUED | OUTPATIENT
Start: 2021-05-01 | End: 2021-05-31 | Stop reason: HOSPADM

## 2021-05-01 RX ADMIN — Medication 10 MG: at 23:26

## 2021-05-01 RX ADMIN — Medication 50 MG: at 17:41

## 2021-05-01 RX ADMIN — BUMETANIDE 4 MG: 2 TABLET ORAL at 16:16

## 2021-05-01 RX ADMIN — METHOCARBAMOL 500 MG: 500 TABLET, FILM COATED ORAL at 05:28

## 2021-05-01 RX ADMIN — ACETAMINOPHEN 650 MG: 325 TABLET, FILM COATED ORAL at 09:27

## 2021-05-01 RX ADMIN — CYCLOBENZAPRINE 5 MG: 5 TABLET, FILM COATED ORAL at 23:26

## 2021-05-01 RX ADMIN — GABAPENTIN 600 MG: 300 CAPSULE ORAL at 22:43

## 2021-05-01 RX ADMIN — Medication 50 MG: at 11:49

## 2021-05-01 RX ADMIN — ALLOPURINOL 300 MG: 300 TABLET ORAL at 09:28

## 2021-05-01 RX ADMIN — ANAKINRA 100 MG: 100 INJECTION, SOLUTION SUBCUTANEOUS at 11:52

## 2021-05-01 RX ADMIN — ISOSORBIDE DINITRATE 10 MG: 10 TABLET ORAL at 14:57

## 2021-05-01 RX ADMIN — Medication 50 MG: at 23:26

## 2021-05-01 RX ADMIN — ACETAMINOPHEN 650 MG: 325 TABLET, FILM COATED ORAL at 05:28

## 2021-05-01 RX ADMIN — WARFARIN SODIUM 7 MG: 4 TABLET ORAL at 17:41

## 2021-05-01 RX ADMIN — BUPROPION HYDROCHLORIDE 100 MG: 100 TABLET, EXTENDED RELEASE ORAL at 20:14

## 2021-05-01 RX ADMIN — FLUTICASONE FUROATE AND VILANTEROL TRIFENATATE 1 PUFF: 100; 25 POWDER RESPIRATORY (INHALATION) at 09:24

## 2021-05-01 RX ADMIN — ACETAMINOPHEN 650 MG: 325 TABLET, FILM COATED ORAL at 23:25

## 2021-05-01 RX ADMIN — MONTELUKAST 10 MG: 10 TABLET, FILM COATED ORAL at 22:43

## 2021-05-01 RX ADMIN — ACARBOSE 25 MG: 25 TABLET ORAL at 09:29

## 2021-05-01 RX ADMIN — Medication 37.5 MG: at 09:30

## 2021-05-01 RX ADMIN — ASPIRIN 81 MG CHEWABLE TABLET 81 MG: 81 TABLET CHEWABLE at 09:28

## 2021-05-01 RX ADMIN — ACETAMINOPHEN 650 MG: 325 TABLET, FILM COATED ORAL at 14:57

## 2021-05-01 RX ADMIN — METHOCARBAMOL 500 MG: 500 TABLET, FILM COATED ORAL at 11:49

## 2021-05-01 RX ADMIN — ACARBOSE 25 MG: 25 TABLET ORAL at 17:41

## 2021-05-01 RX ADMIN — HYDRALAZINE HYDROCHLORIDE 75 MG: 50 TABLET ORAL at 20:14

## 2021-05-01 RX ADMIN — CETIRIZINE HYDROCHLORIDE 10 MG: 10 TABLET, FILM COATED ORAL at 09:28

## 2021-05-01 RX ADMIN — BUMETANIDE 4 MG: 2 TABLET ORAL at 09:29

## 2021-05-01 RX ADMIN — POTASSIUM CHLORIDE 40 MEQ: 750 TABLET, EXTENDED RELEASE ORAL at 20:14

## 2021-05-01 RX ADMIN — HYDRALAZINE HYDROCHLORIDE 75 MG: 50 TABLET ORAL at 14:57

## 2021-05-01 RX ADMIN — HYDRALAZINE HYDROCHLORIDE 75 MG: 50 TABLET ORAL at 09:28

## 2021-05-01 RX ADMIN — ISOSORBIDE DINITRATE 10 MG: 10 TABLET ORAL at 20:14

## 2021-05-01 RX ADMIN — AMIODARONE HYDROCHLORIDE 200 MG: 200 TABLET ORAL at 09:28

## 2021-05-01 RX ADMIN — UMECLIDINIUM 1 PUFF: 62.5 AEROSOL, POWDER ORAL at 09:26

## 2021-05-01 RX ADMIN — ACETAMINOPHEN 650 MG: 325 TABLET, FILM COATED ORAL at 20:14

## 2021-05-01 RX ADMIN — Medication 2.5 MG: at 09:27

## 2021-05-01 RX ADMIN — GABAPENTIN 300 MG: 300 CAPSULE ORAL at 09:29

## 2021-05-01 RX ADMIN — BUPROPION HYDROCHLORIDE 100 MG: 100 TABLET, EXTENDED RELEASE ORAL at 09:29

## 2021-05-01 RX ADMIN — Medication 50 MG: at 05:28

## 2021-05-01 RX ADMIN — Medication 37.5 MG: at 22:44

## 2021-05-01 RX ADMIN — ISOSORBIDE DINITRATE 10 MG: 10 TABLET ORAL at 09:29

## 2021-05-01 RX ADMIN — DOCUSATE SODIUM 50 MG AND SENNOSIDES 8.6 MG 2 TABLET: 8.6; 5 TABLET, FILM COATED ORAL at 20:14

## 2021-05-01 RX ADMIN — GABAPENTIN 600 MG: 300 CAPSULE ORAL at 14:57

## 2021-05-01 RX ADMIN — POTASSIUM CHLORIDE 40 MEQ: 750 TABLET, EXTENDED RELEASE ORAL at 09:27

## 2021-05-01 ASSESSMENT — MIFFLIN-ST. JEOR: SCORE: 1792.36

## 2021-05-01 ASSESSMENT — ACTIVITIES OF DAILY LIVING (ADL)
ADLS_ACUITY_SCORE: 12

## 2021-05-01 NOTE — PROGRESS NOTES
[facility]   Cardiology II Service / Advanced Heart Failure  Daily Progress Note  Date of Service: 5/1/2021      Patient: Jim Willingham  MRN: [id number]  Admission Date: 2/8/2021  Hospital Day # 82       Assessment and Plan: Jim Willingham is a 65 year old male with history of NICM EF 20% c/b VT s/p CRT-D s/p HMII LVAD 6/19/2017 originally intended as destination therapy 2/2 obesity however with recent weight loss now candidate for transplantation. He is admitted for worsening functional status and renal function concerning for worsening heart failure. He is now listed status 2E for transplant.    Today's plan:   - OTD oxy 2.5 mg PO d/t breakthrough pain in hands,wrists  - Stop robaxin and try Flexeril 5 mg TID PRN  - podiatry consult Monday      Chronic systolic heart failure secondary to NICM s/p HM II LVAD. Echo 1/8/21 at 9400 rpm, EF<30%m LVIDd 6.8cm, at least mildly reduced RV function, AoV closed without AI, mild-mod eccentric MR, dilated IVC without collapse. RHC 3/23 RA 10 mPA 25 mPCWP 14 Kee CO/CI 6.5/3.1.  Stage D, NYHA Class III  ACEi/ARB contraindicated due to renal dysfunction. Isordil 10 mg po TID. Hydralazine 75 mg po TID.   BB Contraindicated due to low cardiac output.   Aldosterone antagonist contraindicated due to renal dysfunction  SCD prophylaxis CRT-D   Fluid status: Near euvolemia. Bumex 4 mg po BID. Weight up 3 lbs today, but was taken after breakfast and lunch.   MAP: 80   LDH: 319 4/23  Anticoagulation: Coumadin per pharmacy. INR- 2.84, goal 2.5-3, goal increased in setting of power spikes and elevated flows this hospitalization.    Antiplatelet: ASA 81 mg po daily  - remains on the cardiac transplant list status 2E. Persistent escalation of diuretics in setting of progressive heart failure with refractory hypervolemia.      WALTER on CKD Stage III. Cr peaked at 2.22.  - Cr- 1.53 (1.64).     History of Afib. History of VT/VF. Asymptomatic bursts of questionable AF vs  NSVT without hemodynamic compromise. RHC 3/23 RA 10 mPA 25 mPCWP 14 Kee CO/CI 6.5/3.1. CHADSVASC-5.   - Continue Amiodarone 200 mg po daily.   - Coumadin as above.      DM type II, controlled. Symptomatic Hypoglycemia, resolved. Low Cortisol: Hgb A1C-6.2. Lantus and Novololg sliding scale insulin discontinued. Endo consult appreciated. C-peptide 13.9 an Proinsulin 18.4. Serum cortisol 7.7 3/6/21, cosyntropin stim test WNL per Endo.   -Per endo --> Fingerstick glucose might not be accurate for hypoglycemia, if patient has glucose <55 (without insulin), plasma glucose should be sent for confirmation of hypoglycemia prior to correction. While the patient is off insulin therapy, POC glucose readings above 60 are acceptable for the patient.   Stable blood glucose this AM without overnight hypoglycemia.    - Continue Acarbose 25 mg po in AM add evening dose given overnight hypoglycemia.      Carpel tunnel, bilateral s/p bilateral release. Evidence of thenar atrophy. Relief with steroid injection 11/20. Carpal tunnel release 2/18/21. Appreciate Ortho/Plastics consult, signed off. Continue Tylenol 650 mg po QID and Gabapentin 300 mg in AM, 600 mg in the afternoon, and 600 mg in the evening.   - Lidocaine gel prn.   - Appreciate Plastic surgery consult, signed off.   - Tramadol prn.  - Elavil increased to 37.5 mg po at HS.  - Hand braces and therapy consult appreciated.   - Stop robaxin and try Flexeril 5 mg TID PRN    Neutropenia.   - WBC trending up 4.2 (3.6). Diff with ANC of 1.4.   - Will discuss Anakinra with Rheumatology, --> decrease to every other day.      Chronic/resolved conditions  COPD/Asthma: pta Breo Ellipta, albuterol prn.   Depression: pta Bupropion  Right sided weakness, resolved. CT head negative for acute findings. Appreciate Neuro eval.   Staph Hominis Bacteremia. No need for surveillance blood cx per transplant ID.    Rhinovirus, resolved. Bacterial bronchitis. Completed 5 day course of Cefdinir. COVID  "negative 2/8. Resp PCR +rhinovirus. CXR 2/12 with no overt PNA. Stopped cefdinir 2/15. Cleared for transplant per ID. Repeat RVP negative 3/5. COVID repeated due to sinus congestion, negative. Congestion improved with transition from Claritin to Zyrtec.      FEN: 3 gram sodium diet   PROPHY:  Coumadin  LINES:  PIV   DISPO:  TBD pending cardiac transplant.   CODE STATUS: Full Code   ================================================================  Interval History/ROS: Feeling well today overall. Had difficulties getting out of bed this morning due to muscle spasms and stiff joints in shoulders, back, wrists, and hands. Reports he worked out yesterday at 2x the weight he normally uses and he attributes the muscle spasms and stiff joints this morning to workout. Feels better after 2.5 mg Oxy and PRN Robaxin. Now able to ambulate in halls and around room without significant pain or RESENDIZ. Requests Robaxin dose or frequency can be increased. He does not feel an increase in fluid retention today, urinating adequately, good PO intake. Denies CP, palpitations, SOB at rest, orthopnea, PND, sore throat, cough, dizziness, N/V/D, constipation, HA, vision changes, or signs of bleeding.     Last 24 hr care team notes reviewed.   ROS:  4 point ROS including Respiratory, CV, GI and , other than that noted in the HPI, is negative.     Medications: Reviewed in EPIC.     Physical Exam:   BP 96/78 (BP Location: Left arm)   Pulse 66   Temp 98  F (36.7  C) (Oral)   Resp 16   Ht 1.727 m (5' 8\")   Wt 100.9 kg (222 lb 8 oz)   SpO2 95%   BMI 33.83 kg/m    GENERAL: Appears alert and oriented times three.   HEENT: Eye symmetrical and free of discharge bilaterally. Mucous membranes moist and without lesions.  NECK: Supple and without lymphadenopathy. JVD 1 cm above clavicle.    CV: RRR, S1S2 present with LVAD hum.   RESPIRATORY: Respirations regular, even, and unlabored. Lungs CTA throughout.   GI: Soft and non distended with " normoactive bowel sounds present in all quadrants. No tenderness, rebound, guarding. No organomegaly.   EXTREMITIES: Trace bilateral LE peripheral edema. 2+ bilateral pedal pulses.   NEUROLOGIC: Alert and oriented x 3. CN II-XII grossly intact. No focal deficits.   MUSCULOSKELETAL: No joint swelling or tenderness.   SKIN: No jaundice. No rashes or lesions. LVAD drive line covered.     Data:  CMP  Recent Labs   Lab 05/01/21  0701 04/30/21  0514 04/29/21  0555 04/28/21  1753 04/28/21  0604 04/26/21  0523 04/26/21 0523    135 133 133 136   < > 135   POTASSIUM 4.0 3.9 3.6 3.8 4.1   < > 3.5   CHLORIDE 103 101 99 100 102   < > 101   CO2 28 28 28 28 27   < > 27   ANIONGAP 6 6 6 4 6   < > 8   GLC 90 94 83 86 93   < > 96   BUN 52* 55* 52* 51* 46*   < > 54*   CR 1.53* 1.70* 1.64* 1.69* 1.57*   < > 1.64*   GFRESTIMATED 47* 42* 44* 42* 46*   < > 44*   GFRESTBLACK 55* 48* 51* 49* 53*   < > 51*   QAMAR 8.5 8.4* 8.7 8.8 8.9   < > 8.6   MAG 2.5* 2.6* 2.5*  --  2.5*   < > 2.5*   PROTTOTAL  --   --   --   --   --   --  6.4*   ALBUMIN  --   --   --   --   --   --  3.4   BILITOTAL  --   --   --   --   --   --  0.7   ALKPHOS  --   --   --   --   --   --  108   AST  --   --   --   --   --   --  32   ALT  --   --   --   --   --   --  30    < > = values in this interval not displayed.     CBC  Recent Labs   Lab 04/30/21  0514 04/29/21  0555 04/26/21 0523   WBC 4.2 3.6* 3.8*   RBC 3.81* 3.87* 3.80*   HGB 11.2* 11.9* 11.1*   HCT 36.2* 37.0* 35.6*   MCV 95 96 94   MCH 29.4 30.7 29.2   MCHC 30.9* 32.2 31.2*   RDW 14.8 14.7 14.9    176 171     INR  Recent Labs   Lab 05/01/21  0701 04/30/21  0514 04/29/21  0555 04/28/21  0604   INR 2.84* 2.95* 2.94* 2.95*           Belkys Johansen RN-BSN, DNP-Student      Patient seen and examined with Belkys Johansen DNP student.  I agree with the information noted above.      The above was reviewed with Dr. Mccarty.        Shelia Means DNP, FNP-BC, CHFN  Advanced Heart Failure Nurse  Practitioner  [facility] [place]

## 2021-05-01 NOTE — PROCEDURES
The patient's HeartMate LVAD was interrogated 5/1/2021  * Speed 9600 rpm   * Pulsatility index 4.1-5   * Power 6.3-6.9 Manuel   * Flow 5.6-6.6 L/minute   Fluid status: euvolemic   Alarms were reviewed, with no LVAD alarms or signs of pump malfunction.   The driveline exit site was covered, with dressing c/d/i.   All external components were inspected and showed no evidence of damage or malfunction, none replaced.   No changes to VAD settings made.      Shelia Means DNP, FNP-BC, CHFN  Advanced Heart Failure Nurse Practitioner  [facility]

## 2021-05-01 NOTE — PROGRESS NOTES
"SPIRITUAL HEALTH SERVICES  SPIRITUAL ASSESSMENT Progress Note  [facility] 6C     In-depth visit with pt, who talked about:     \"how hard this has all been for my wife. It really brings back memories of her first , who  of basically the same problem I have...\"     how his willa is sustaining him \"when I get anxious and stressed...\" - pt shared how he has strong support from his [employer] community     he remains motivated and optimistic  Prayer was welcomed.     PLAN: continue to follow, visiting at least weekly while pt on unit.    Parviz Early) Jg Smith M.Div., [facility]  Staff   Pager [phone]      * [facility] remains available  for emergent requests/referrals, either by having the switchboard page the on-call  or by entering an ASAP/STAT consult in Epic (this will also page the on-call ). Routine Epic consults receive an initial response within 24 hours.*      "

## 2021-05-01 NOTE — PLAN OF CARE
D: Pt heart transplant status 2E. Pt stable and comfortable. AVSS, LVAD #s WNL with slightly higher flows (max 7.2 noted). No shortness of breath noted. Bilateral wrist pain and R shoulder pain relieved with PRN tylenol, robaxin and tramadol and breakthrough pain relieved with one-time order 2.5 oxycodone. Robaxin discontinued and replaced by flexeril.  I: Monitored/assessed pt. Assisted with cares.  A: Pt stable and comfortable.  P: Continue to monitor/assess pt, contact provider with concerns.

## 2021-05-01 NOTE — PLAN OF CARE
AVSS.  LVAD #'s per baseline.  Wrist pain controlled with Tylenol/Robaxin/Ultram.  Wearing wrist splints.  Pt stable, slept without complaints.  Declined 0200 BG check.  SR.  Continues Status 2E on heart transplant list.  Notify Cards 2 with any issues/concerns.

## 2021-05-02 LAB
ANION GAP SERPL CALCULATED.3IONS-SCNC: 8 MMOL/L (ref 3–14)
BUN SERPL-MCNC: 54 MG/DL (ref 7–30)
CALCIUM SERPL-MCNC: 8.6 MG/DL (ref 8.5–10.1)
CHLORIDE SERPL-SCNC: 100 MMOL/L (ref 94–109)
CO2 SERPL-SCNC: 26 MMOL/L (ref 20–32)
CREAT SERPL-MCNC: 1.71 MG/DL (ref 0.66–1.25)
GFR SERPL CREATININE-BSD FRML MDRD: 41 ML/MIN/{1.73_M2}
GLUCOSE BLDC GLUCOMTR-MCNC: 101 MG/DL (ref 70–99)
GLUCOSE BLDC GLUCOMTR-MCNC: 102 MG/DL (ref 70–99)
GLUCOSE BLDC GLUCOMTR-MCNC: 111 MG/DL (ref 70–99)
GLUCOSE BLDC GLUCOMTR-MCNC: 116 MG/DL (ref 70–99)
GLUCOSE BLDC GLUCOMTR-MCNC: 81 MG/DL (ref 70–99)
GLUCOSE BLDC GLUCOMTR-MCNC: 90 MG/DL (ref 70–99)
GLUCOSE SERPL-MCNC: 115 MG/DL (ref 70–99)
INR PPP: 2.59 (ref 0.86–1.14)
MAGNESIUM SERPL-MCNC: 2.6 MG/DL (ref 1.6–2.3)
POTASSIUM SERPL-SCNC: 3.9 MMOL/L (ref 3.4–5.3)
SODIUM SERPL-SCNC: 134 MMOL/L (ref 133–144)

## 2021-05-02 PROCEDURE — 250N000013 HC RX MED GY IP 250 OP 250 PS 637: Performed by: STUDENT IN AN ORGANIZED HEALTH CARE EDUCATION/TRAINING PROGRAM

## 2021-05-02 PROCEDURE — 36415 COLL VENOUS BLD VENIPUNCTURE: CPT | Performed by: STUDENT IN AN ORGANIZED HEALTH CARE EDUCATION/TRAINING PROGRAM

## 2021-05-02 PROCEDURE — 214N000001 HC R&B CCU UMMC

## 2021-05-02 PROCEDURE — 93750 INTERROGATION VAD IN PERSON: CPT | Performed by: NURSE PRACTITIONER

## 2021-05-02 PROCEDURE — 80048 BASIC METABOLIC PNL TOTAL CA: CPT | Performed by: STUDENT IN AN ORGANIZED HEALTH CARE EDUCATION/TRAINING PROGRAM

## 2021-05-02 PROCEDURE — 250N000013 HC RX MED GY IP 250 OP 250 PS 637: Performed by: NURSE PRACTITIONER

## 2021-05-02 PROCEDURE — 99232 SBSQ HOSP IP/OBS MODERATE 35: CPT | Mod: 25 | Performed by: NURSE PRACTITIONER

## 2021-05-02 PROCEDURE — 83735 ASSAY OF MAGNESIUM: CPT | Performed by: STUDENT IN AN ORGANIZED HEALTH CARE EDUCATION/TRAINING PROGRAM

## 2021-05-02 PROCEDURE — 999N001017 HC STATISTIC GLUCOSE BY METER IP

## 2021-05-02 PROCEDURE — 250N000013 HC RX MED GY IP 250 OP 250 PS 637: Performed by: INTERNAL MEDICINE

## 2021-05-02 PROCEDURE — 250N000013 HC RX MED GY IP 250 OP 250 PS 637: Performed by: PHYSICIAN ASSISTANT

## 2021-05-02 PROCEDURE — 85610 PROTHROMBIN TIME: CPT | Performed by: STUDENT IN AN ORGANIZED HEALTH CARE EDUCATION/TRAINING PROGRAM

## 2021-05-02 PROCEDURE — 250N000011 HC RX IP 250 OP 636: Performed by: NURSE PRACTITIONER

## 2021-05-02 RX ORDER — WARFARIN SODIUM 4 MG/1
8 TABLET ORAL
Status: COMPLETED | OUTPATIENT
Start: 2021-05-02 | End: 2021-05-02

## 2021-05-02 RX ORDER — WARFARIN SODIUM 7.5 MG/1
7.5 TABLET ORAL
Status: DISCONTINUED | OUTPATIENT
Start: 2021-05-02 | End: 2021-05-02

## 2021-05-02 RX ADMIN — ISOSORBIDE DINITRATE 10 MG: 10 TABLET ORAL at 13:42

## 2021-05-02 RX ADMIN — FLUTICASONE FUROATE AND VILANTEROL TRIFENATATE 1 PUFF: 100; 25 POWDER RESPIRATORY (INHALATION) at 09:11

## 2021-05-02 RX ADMIN — ACARBOSE 25 MG: 25 TABLET ORAL at 18:33

## 2021-05-02 RX ADMIN — Medication 50 MG: at 22:10

## 2021-05-02 RX ADMIN — POTASSIUM CHLORIDE 40 MEQ: 750 TABLET, EXTENDED RELEASE ORAL at 20:05

## 2021-05-02 RX ADMIN — CYCLOBENZAPRINE 5 MG: 5 TABLET, FILM COATED ORAL at 22:10

## 2021-05-02 RX ADMIN — HYDRALAZINE HYDROCHLORIDE 75 MG: 50 TABLET ORAL at 20:06

## 2021-05-02 RX ADMIN — GABAPENTIN 600 MG: 300 CAPSULE ORAL at 13:42

## 2021-05-02 RX ADMIN — CHLOROTHIAZIDE SODIUM 250 MG: 500 INJECTION, POWDER, LYOPHILIZED, FOR SOLUTION INTRAVENOUS at 15:30

## 2021-05-02 RX ADMIN — UMECLIDINIUM 1 PUFF: 62.5 AEROSOL, POWDER ORAL at 09:11

## 2021-05-02 RX ADMIN — POTASSIUM CHLORIDE 40 MEQ: 750 TABLET, EXTENDED RELEASE ORAL at 09:12

## 2021-05-02 RX ADMIN — ACARBOSE 25 MG: 25 TABLET ORAL at 09:11

## 2021-05-02 RX ADMIN — MONTELUKAST 10 MG: 10 TABLET, FILM COATED ORAL at 22:10

## 2021-05-02 RX ADMIN — ASPIRIN 81 MG CHEWABLE TABLET 81 MG: 81 TABLET CHEWABLE at 09:11

## 2021-05-02 RX ADMIN — GABAPENTIN 300 MG: 300 CAPSULE ORAL at 09:12

## 2021-05-02 RX ADMIN — ACETAMINOPHEN 650 MG: 325 TABLET, FILM COATED ORAL at 16:57

## 2021-05-02 RX ADMIN — CETIRIZINE HYDROCHLORIDE 10 MG: 10 TABLET, FILM COATED ORAL at 09:11

## 2021-05-02 RX ADMIN — BUPROPION HYDROCHLORIDE 100 MG: 100 TABLET, EXTENDED RELEASE ORAL at 09:11

## 2021-05-02 RX ADMIN — AMIODARONE HYDROCHLORIDE 200 MG: 200 TABLET ORAL at 09:11

## 2021-05-02 RX ADMIN — BUPROPION HYDROCHLORIDE 100 MG: 100 TABLET, EXTENDED RELEASE ORAL at 20:06

## 2021-05-02 RX ADMIN — Medication 10 MG: at 22:10

## 2021-05-02 RX ADMIN — WARFARIN SODIUM 8 MG: 4 TABLET ORAL at 17:06

## 2021-05-02 RX ADMIN — HYDRALAZINE HYDROCHLORIDE 75 MG: 50 TABLET ORAL at 09:11

## 2021-05-02 RX ADMIN — GABAPENTIN 600 MG: 300 CAPSULE ORAL at 22:10

## 2021-05-02 RX ADMIN — HYDRALAZINE HYDROCHLORIDE 75 MG: 50 TABLET ORAL at 13:42

## 2021-05-02 RX ADMIN — ACETAMINOPHEN 650 MG: 325 TABLET, FILM COATED ORAL at 22:10

## 2021-05-02 RX ADMIN — BUMETANIDE 4 MG: 2 TABLET ORAL at 16:57

## 2021-05-02 RX ADMIN — ISOSORBIDE DINITRATE 10 MG: 10 TABLET ORAL at 09:12

## 2021-05-02 RX ADMIN — Medication 37.5 MG: at 22:11

## 2021-05-02 RX ADMIN — ISOSORBIDE DINITRATE 10 MG: 10 TABLET ORAL at 20:06

## 2021-05-02 RX ADMIN — ALLOPURINOL 300 MG: 300 TABLET ORAL at 09:12

## 2021-05-02 RX ADMIN — DOCUSATE SODIUM 50 MG AND SENNOSIDES 8.6 MG 2 TABLET: 8.6; 5 TABLET, FILM COATED ORAL at 20:16

## 2021-05-02 RX ADMIN — BUMETANIDE 4 MG: 2 TABLET ORAL at 09:11

## 2021-05-02 ASSESSMENT — ACTIVITIES OF DAILY LIVING (ADL)
ADLS_ACUITY_SCORE: 12

## 2021-05-02 ASSESSMENT — MIFFLIN-ST. JEOR: SCORE: 1801.89

## 2021-05-02 NOTE — PROCEDURES
The patient's HeartMate LVAD was interrogated 5/2/2021  * Speed 9600 rpm   * Pulsatility index 3.1-4.3   * Power 6.7-6.9 Manuel   * Flow 6.7-6.9 L/minute   Fluid status: slightly hypervolemic   Alarms were reviewed, with no LVAD alarms or signs of pump malfunction.   The driveline exit site was covered, with dressing c/d/i.   All external components were inspected and showed no evidence of damage or malfunction, none replaced.   No changes to VAD settings made.      Shelia Means DNP, FNP-BC, CHFN  Advanced Heart Failure Nurse Practitioner  [facility] [place]

## 2021-05-02 NOTE — PROGRESS NOTES
[facility]   Cardiology II Service / Advanced Heart Failure  Daily Progress Note  Date of Service: 5/2/2021      Patient: Jim Willingham  MRN: [id number]  Admission Date: 2/8/2021  Hospital Day # 83       Assessment and Plan: Jim Willingham is a 65 year old male with history of NICM EF 20% c/b VT s/p CRT-D s/p HMII LVAD 6/19/2017 originally intended as destination therapy 2/2 obesity however with recent weight loss now candidate for transplantation. He is admitted for worsening functional status and renal function concerning for worsening heart failure. He is now listed status 2E for transplant.    Today's plan:   - IV Diuril 250 mg  X 1 today  - podiatry consult Monday      Chronic systolic heart failure secondary to NICM s/p HM II LVAD. Echo 1/8/21 at 9400 rpm, EF<30%m LVIDd 6.8cm, at least mildly reduced RV function, AoV closed without AI, mild-mod eccentric MR, dilated IVC without collapse. RHC 3/23 RA 10 mPA 25 mPCWP 14 Kee CO/CI 6.5/3.1.  Stage D, NYHA Class III  ACEi/ARB contraindicated due to renal dysfunction. Isordil 10 mg po TID. Hydralazine 75 mg po TID.   BB Contraindicated due to low cardiac output.   Aldosterone antagonist contraindicated due to renal dysfunction  SCD prophylaxis CRT-D   Fluid status: Hypervolemic. Weight up additional 2 lb today, breathing slightly labored, feels bloated. Give IV Diuril 250 mg + continue Bumex 4 mg po BID.  MAP: 70's - 90's  LDH: 319 4/23  Anticoagulation: Coumadin per pharmacy. INR- 2.59, goal 2.5-3 (goal increased in setting of power spikes and elevated flows this hospitalization.)  Antiplatelet: ASA 81 mg po daily  - remains on the cardiac transplant list status 2E. Persistent escalation of diuretics in setting of progressive heart failure with refractory hypervolemia.      WALTER on CKD Stage III. Cr peaked at 2.22.  - Cr- 1.71 (1.53)  - diuresis as noted above     History of Afib. History of VT/VF. Asymptomatic bursts of questionable AF vs  NSVT without hemodynamic compromise. RHC 3/23 RA 10 mPA 25 mPCWP 14 Kee CO/CI 6.5/3.1.  - Continue Amiodarone 200 mg po daily.   - Coumadin as above.      DM type II, controlled. Symptomatic Hypoglycemia, resolved. Low Cortisol: Hgb A1C-6.2. Lantus and Novololg sliding scale insulin discontinued. Endo consult appreciated. C-peptide 13.9 an Proinsulin 18.4. Serum cortisol 7.7 3/6/21, cosyntropin stim test WNL per Endo.   -Per endo --> Fingerstick glucose might not be accurate for hypoglycemia, if patient has glucose <55 (without insulin), plasma glucose should be sent for confirmation of hypoglycemia prior to correction. While the patient is off insulin therapy, POC glucose readings above 60 are acceptable for the patient.   Stable POC blood glucoses without overnight hypoglycemia.    - Continue Acarbose 25 mg po in AM add evening dose given overnight hypoglycemia.      Carpel tunnel, bilateral s/p bilateral release. Evidence of thenar atrophy. Relief with steroid injection 11/20. Carpal tunnel release 2/18/21. Appreciate Ortho/Plastics consult, signed off. Continue Tylenol 650 mg po QID and Gabapentin 300 mg in AM, 600 mg in the afternoon, and 600 mg in the evening.   - Lidocaine gel prn.   - Appreciate Plastic surgery consult, signed off.   - Tramadol prn.  - Elavil 37.5 mg po at HS.  - Hand braces and therapy consult appreciated.   - Started Flexeril 5 mg TID PRN instead of Robaxin with improved pain relief per patient report.     Neutropenia.   - WBC trending up 4.2 on 4/30/21 (3.6). Diff with ANC of 1.4.   - continue anakinra every other day as discussed with rheumatology     Chronic/resolved conditions  COPD/Asthma: pta Breo Ellipta, albuterol prn.   Depression: pta Bupropion  Right sided weakness, resolved. CT head negative for acute findings. Appreciate Neuro eval.   Staph Hominis Bacteremia. No need for surveillance blood cx per transplant ID.    Rhinovirus, resolved. Bacterial bronchitis. Completed 5 day  "course of Cefdinir. COVID negative 2/8. Resp PCR +rhinovirus. CXR 2/12 with no overt PNA. Stopped cefdinir 2/15. Cleared for transplant per ID. Repeat RVP negative 3/5. COVID repeated due to sinus congestion, negative. Congestion improved with transition from Claritin to Zyrtec.       FEN: 3 gram sodium diet   PROPHY:  Coumadin  LINES:  PIV   DISPO:  TBD pending cardiac transplant.   CODE STATUS: Full Code   ================================================================  Interval History/ROS: Feeling bloated today with noticeable increased in \"conciousness of breathing\", + some RESENDIZ when ambulating in room. He denies any changes with diet or fluid intake the past few days. Denies CP, palpitations, fever/chills, cough, sore throat, dizziness, N/V/D, constipation, HA, vision changes, or signs of bleeding.     Last 24 hr care team notes reviewed.   ROS:  4 point ROS including Respiratory, CV, GI and , other than that noted in the HPI, is negative.     Medications: Reviewed in EPIC.     Physical Exam:   /88 (BP Location: Left arm)   Pulse 61   Temp 97.4  F (36.3  C) (Oral)   Resp 16   Ht 1.727 m (5' 8\")   Wt 103.2 kg (227 lb 9.6 oz)   SpO2 95%   BMI 34.61 kg/m    GENERAL: Appears alert and oriented times three.   HEENT: Eye symmetrical and free of discharge bilaterally. Mucous membranes moist and without lesions.  NECK: Supple and without lymphadenopathy. JVD approx 3 cm above clavicle when sitting upright at 90 degrees.   CV: RRR, S1S2 present with LVAD hum.   RESPIRATORY: Respirations regular, even, and unlabored. Lungs CTA throughout.   GI: Soft and non distended with normoactive bowel sounds present in all quadrants. No tenderness, rebound, guarding. No organomegaly.   EXTREMITIES: Trace bilateral LE peripheral edema. 2+ bilateral pedal pulses.   NEUROLOGIC: Alert and oriented x 3. CN II-XII grossly intact. No focal deficits.   MUSCULOSKELETAL: No joint swelling or tenderness.   SKIN: No jaundice. No " rashes or lesions. LVAD drive line covered.     Data:  CMP  Recent Labs   Lab 05/02/21  0710 05/01/21  0701 04/30/21  0514 04/29/21  0555 04/26/21  0523 04/26/21  0523    136 135 133   < > 135   POTASSIUM 3.9 4.0 3.9 3.6   < > 3.5   CHLORIDE 100 103 101 99   < > 101   CO2 26 28 28 28   < > 27   ANIONGAP 8 6 6 6   < > 8   * 90 94 83   < > 96   BUN 54* 52* 55* 52*   < > 54*   CR 1.71* 1.53* 1.70* 1.64*   < > 1.64*   GFRESTIMATED 41* 47* 42* 44*   < > 44*   GFRESTBLACK 48* 55* 48* 51*   < > 51*   QAMAR 8.6 8.5 8.4* 8.7   < > 8.6   MAG 2.6* 2.5* 2.6* 2.5*   < > 2.5*   PROTTOTAL  --   --   --   --   --  6.4*   ALBUMIN  --   --   --   --   --  3.4   BILITOTAL  --   --   --   --   --  0.7   ALKPHOS  --   --   --   --   --  108   AST  --   --   --   --   --  32   ALT  --   --   --   --   --  30    < > = values in this interval not displayed.     CBC  Recent Labs   Lab 04/30/21  0514 04/29/21  0555 04/26/21  0523   WBC 4.2 3.6* 3.8*   RBC 3.81* 3.87* 3.80*   HGB 11.2* 11.9* 11.1*   HCT 36.2* 37.0* 35.6*   MCV 95 96 94   MCH 29.4 30.7 29.2   MCHC 30.9* 32.2 31.2*   RDW 14.8 14.7 14.9    176 171     INR  Recent Labs   Lab 05/02/21  0710 05/01/21  0701 04/30/21  0514 04/29/21  0555   INR 2.59* 2.84* 2.95* 2.94*           Belkys Johansen, RN-BSN, DNP-Student      The patient was seen and evaluated with Belkys Johansen DNP student.  I agree with the information noted above.    The above was reviewed with Dr. Mccarty.        Shelia Means DNP, FNP-BC, CHFN  Advanced Heart Failure Nurse Practitioner  [facility]

## 2021-05-02 NOTE — PLAN OF CARE
D: Pt heart transplant status 2E. Pt stable and comfortable. AVSS, LVAD #s WNL No shortness of breath noted. Bilateral wrist pain relieved with PRN tylenol. IV diuril given with increased UOP.  I: Monitored/assessed pt. Assisted with cares.  A: Pt stable and comfortable.  P: Continue to monitor/assess pt, contact provider with concerns.

## 2021-05-02 NOTE — PLAN OF CARE
Time of care [id number]  Neuro: AOx4.  Cardiac: SR with 1st degree AVB. LVAD without alarms and numbers within parameters.        Respiratory: Room air during day. Home CPAP over night.   GI/: Voiding adequately. Last BM 5/1 per pt.   Diet/appetite: 3g Na diet. Good appetite. Blood sugar checks ACHS/0200.    Activity: Independent/SBA in room.  Pain: Endorses bilateral wrist pain that radiates to hands. Pain at acceptable level on current regimen.   Skin: No new issues noted. Drive line site CDI.   Lines: PIV saline locked.     Do not disturb order [id number]. Pt appeared to sleep well between cares. Listed status 2E for heart txp. Continue plan of care and update cards 2 team with changes/concerns.

## 2021-05-03 LAB
ALBUMIN SERPL-MCNC: 3.6 G/DL (ref 3.4–5)
ALP SERPL-CCNC: 100 U/L (ref 40–150)
ALT SERPL W P-5'-P-CCNC: 32 U/L (ref 0–70)
ANION GAP SERPL CALCULATED.3IONS-SCNC: 7 MMOL/L (ref 3–14)
AST SERPL W P-5'-P-CCNC: 42 U/L (ref 0–45)
BASOPHILS # BLD AUTO: 0.1 10E9/L (ref 0–0.2)
BASOPHILS NFR BLD AUTO: 2.1 %
BILIRUB DIRECT SERPL-MCNC: 0.2 MG/DL (ref 0–0.2)
BILIRUB SERPL-MCNC: 0.7 MG/DL (ref 0.2–1.3)
BUN SERPL-MCNC: 56 MG/DL (ref 7–30)
CALCIUM SERPL-MCNC: 8.8 MG/DL (ref 8.5–10.1)
CHLORIDE SERPL-SCNC: 102 MMOL/L (ref 94–109)
CO2 SERPL-SCNC: 26 MMOL/L (ref 20–32)
CREAT SERPL-MCNC: 1.81 MG/DL (ref 0.66–1.25)
DIFFERENTIAL METHOD BLD: ABNORMAL
EOSINOPHIL # BLD AUTO: 0.3 10E9/L (ref 0–0.7)
EOSINOPHIL NFR BLD AUTO: 5.9 %
ERYTHROCYTE [DISTWIDTH] IN BLOOD BY AUTOMATED COUNT: 14.6 % (ref 10–15)
GFR SERPL CREATININE-BSD FRML MDRD: 39 ML/MIN/{1.73_M2}
GLUCOSE BLDC GLUCOMTR-MCNC: 105 MG/DL (ref 70–99)
GLUCOSE BLDC GLUCOMTR-MCNC: 180 MG/DL (ref 70–99)
GLUCOSE BLDC GLUCOMTR-MCNC: 90 MG/DL (ref 70–99)
GLUCOSE BLDC GLUCOMTR-MCNC: 93 MG/DL (ref 70–99)
GLUCOSE SERPL-MCNC: 89 MG/DL (ref 70–99)
HCT VFR BLD AUTO: 38.7 % (ref 40–53)
HGB BLD-MCNC: 12.3 G/DL (ref 13.3–17.7)
IMM GRANULOCYTES # BLD: 0 10E9/L (ref 0–0.4)
IMM GRANULOCYTES NFR BLD: 0.2 %
INR PPP: 2.31 (ref 0.86–1.14)
LYMPHOCYTES # BLD AUTO: 1.6 10E9/L (ref 0.8–5.3)
LYMPHOCYTES NFR BLD AUTO: 38.8 %
MAGNESIUM SERPL-MCNC: 2.5 MG/DL (ref 1.6–2.3)
MCH RBC QN AUTO: 30.5 PG (ref 26.5–33)
MCHC RBC AUTO-ENTMCNC: 31.8 G/DL (ref 31.5–36.5)
MCV RBC AUTO: 96 FL (ref 78–100)
MONOCYTES # BLD AUTO: 0.8 10E9/L (ref 0–1.3)
MONOCYTES NFR BLD AUTO: 18.2 %
NEUTROPHILS # BLD AUTO: 1.5 10E9/L (ref 1.6–8.3)
NEUTROPHILS NFR BLD AUTO: 34.8 %
NRBC # BLD AUTO: 0 10*3/UL
NRBC BLD AUTO-RTO: 0 /100
PLATELET # BLD AUTO: 186 10E9/L (ref 150–450)
POTASSIUM SERPL-SCNC: 3.9 MMOL/L (ref 3.4–5.3)
PROT SERPL-MCNC: 6.5 G/DL (ref 6.8–8.8)
RBC # BLD AUTO: 4.03 10E12/L (ref 4.4–5.9)
SODIUM SERPL-SCNC: 135 MMOL/L (ref 133–144)
WBC # BLD AUTO: 4.2 10E9/L (ref 4–11)

## 2021-05-03 PROCEDURE — 999N001017 HC STATISTIC GLUCOSE BY METER IP

## 2021-05-03 PROCEDURE — 250N000013 HC RX MED GY IP 250 OP 250 PS 637: Performed by: NURSE PRACTITIONER

## 2021-05-03 PROCEDURE — 83735 ASSAY OF MAGNESIUM: CPT | Performed by: STUDENT IN AN ORGANIZED HEALTH CARE EDUCATION/TRAINING PROGRAM

## 2021-05-03 PROCEDURE — 93750 INTERROGATION VAD IN PERSON: CPT | Performed by: NURSE PRACTITIONER

## 2021-05-03 PROCEDURE — 99232 SBSQ HOSP IP/OBS MODERATE 35: CPT | Mod: 25 | Performed by: NURSE PRACTITIONER

## 2021-05-03 PROCEDURE — 250N000013 HC RX MED GY IP 250 OP 250 PS 637: Performed by: STUDENT IN AN ORGANIZED HEALTH CARE EDUCATION/TRAINING PROGRAM

## 2021-05-03 PROCEDURE — 250N000009 HC RX 250: Performed by: NURSE PRACTITIONER

## 2021-05-03 PROCEDURE — 80048 BASIC METABOLIC PNL TOTAL CA: CPT | Performed by: STUDENT IN AN ORGANIZED HEALTH CARE EDUCATION/TRAINING PROGRAM

## 2021-05-03 PROCEDURE — 80076 HEPATIC FUNCTION PANEL: CPT | Performed by: NURSE PRACTITIONER

## 2021-05-03 PROCEDURE — 85610 PROTHROMBIN TIME: CPT | Performed by: STUDENT IN AN ORGANIZED HEALTH CARE EDUCATION/TRAINING PROGRAM

## 2021-05-03 PROCEDURE — 85025 COMPLETE CBC W/AUTO DIFF WBC: CPT | Performed by: STUDENT IN AN ORGANIZED HEALTH CARE EDUCATION/TRAINING PROGRAM

## 2021-05-03 PROCEDURE — 250N000013 HC RX MED GY IP 250 OP 250 PS 637: Performed by: INTERNAL MEDICINE

## 2021-05-03 PROCEDURE — 214N000001 HC R&B CCU UMMC

## 2021-05-03 PROCEDURE — 250N000013 HC RX MED GY IP 250 OP 250 PS 637: Performed by: PHYSICIAN ASSISTANT

## 2021-05-03 PROCEDURE — 36415 COLL VENOUS BLD VENIPUNCTURE: CPT | Performed by: STUDENT IN AN ORGANIZED HEALTH CARE EDUCATION/TRAINING PROGRAM

## 2021-05-03 PROCEDURE — 80076 HEPATIC FUNCTION PANEL: CPT | Performed by: STUDENT IN AN ORGANIZED HEALTH CARE EDUCATION/TRAINING PROGRAM

## 2021-05-03 RX ORDER — WARFARIN SODIUM 4 MG/1
8 TABLET ORAL
Status: COMPLETED | OUTPATIENT
Start: 2021-05-03 | End: 2021-05-03

## 2021-05-03 RX ADMIN — POTASSIUM CHLORIDE 40 MEQ: 750 TABLET, EXTENDED RELEASE ORAL at 09:04

## 2021-05-03 RX ADMIN — ASPIRIN 81 MG CHEWABLE TABLET 81 MG: 81 TABLET CHEWABLE at 09:04

## 2021-05-03 RX ADMIN — GABAPENTIN 600 MG: 300 CAPSULE ORAL at 13:57

## 2021-05-03 RX ADMIN — Medication 50 MG: at 21:56

## 2021-05-03 RX ADMIN — HYDRALAZINE HYDROCHLORIDE 75 MG: 50 TABLET ORAL at 13:57

## 2021-05-03 RX ADMIN — AMIODARONE HYDROCHLORIDE 200 MG: 200 TABLET ORAL at 09:04

## 2021-05-03 RX ADMIN — FLUTICASONE FUROATE AND VILANTEROL TRIFENATATE 1 PUFF: 100; 25 POWDER RESPIRATORY (INHALATION) at 09:04

## 2021-05-03 RX ADMIN — BUMETANIDE 4 MG: 2 TABLET ORAL at 09:04

## 2021-05-03 RX ADMIN — CYCLOBENZAPRINE 5 MG: 5 TABLET, FILM COATED ORAL at 21:42

## 2021-05-03 RX ADMIN — GABAPENTIN 600 MG: 300 CAPSULE ORAL at 21:40

## 2021-05-03 RX ADMIN — ISOSORBIDE DINITRATE 10 MG: 10 TABLET ORAL at 21:40

## 2021-05-03 RX ADMIN — ACETAMINOPHEN 650 MG: 325 TABLET, FILM COATED ORAL at 21:56

## 2021-05-03 RX ADMIN — ALLOPURINOL 300 MG: 300 TABLET ORAL at 09:04

## 2021-05-03 RX ADMIN — ACETAMINOPHEN 650 MG: 325 TABLET, FILM COATED ORAL at 06:18

## 2021-05-03 RX ADMIN — ACARBOSE 25 MG: 25 TABLET ORAL at 17:22

## 2021-05-03 RX ADMIN — Medication 50 MG: at 06:19

## 2021-05-03 RX ADMIN — ACARBOSE 25 MG: 25 TABLET ORAL at 09:04

## 2021-05-03 RX ADMIN — Medication 10 MG: at 21:41

## 2021-05-03 RX ADMIN — BUPROPION HYDROCHLORIDE 100 MG: 100 TABLET, EXTENDED RELEASE ORAL at 21:39

## 2021-05-03 RX ADMIN — ISOSORBIDE DINITRATE 10 MG: 10 TABLET ORAL at 09:04

## 2021-05-03 RX ADMIN — BUPROPION HYDROCHLORIDE 100 MG: 100 TABLET, EXTENDED RELEASE ORAL at 09:04

## 2021-05-03 RX ADMIN — HYDRALAZINE HYDROCHLORIDE 75 MG: 50 TABLET ORAL at 09:03

## 2021-05-03 RX ADMIN — WARFARIN SODIUM 8 MG: 4 TABLET ORAL at 17:22

## 2021-05-03 RX ADMIN — UMECLIDINIUM 1 PUFF: 62.5 AEROSOL, POWDER ORAL at 09:04

## 2021-05-03 RX ADMIN — GABAPENTIN 300 MG: 300 CAPSULE ORAL at 09:04

## 2021-05-03 RX ADMIN — ISOSORBIDE DINITRATE 10 MG: 10 TABLET ORAL at 13:57

## 2021-05-03 RX ADMIN — POTASSIUM CHLORIDE 40 MEQ: 750 TABLET, EXTENDED RELEASE ORAL at 21:39

## 2021-05-03 RX ADMIN — Medication 37.5 MG: at 21:44

## 2021-05-03 RX ADMIN — HYDRALAZINE HYDROCHLORIDE 75 MG: 50 TABLET ORAL at 21:40

## 2021-05-03 RX ADMIN — MONTELUKAST 10 MG: 10 TABLET, FILM COATED ORAL at 21:40

## 2021-05-03 RX ADMIN — CETIRIZINE HYDROCHLORIDE 10 MG: 10 TABLET, FILM COATED ORAL at 09:04

## 2021-05-03 RX ADMIN — BUMETANIDE 4 MG: 2 TABLET ORAL at 17:22

## 2021-05-03 RX ADMIN — ANAKINRA 100 MG: 100 INJECTION, SOLUTION SUBCUTANEOUS at 09:05

## 2021-05-03 ASSESSMENT — ACTIVITIES OF DAILY LIVING (ADL)
ADLS_ACUITY_SCORE: 12

## 2021-05-03 ASSESSMENT — MIFFLIN-ST. JEOR: SCORE: 1791.45

## 2021-05-03 NOTE — PLAN OF CARE
D: Status 2E for heart transplant.    I: Monitored vitals and assessed pt status.     A: A0x4. VSS on RA/ home CPAP overnight. Independent in the room. DND orders [id number]. Pt reported that hand pain overnight tolerable on current regimen. Appeared to rest comfortable overnight. Urinating well overnight.     P: Continue to monitor Pt status and report changes to treatment team.

## 2021-05-03 NOTE — PLAN OF CARE
D: Status 2E for heart transplant.     I: Monitored vitals and assessed pt status.      A: A0x4. VSS on RA. Tele shows paced rhythm, rate 60-80. LVAD without alarms and numbers within parameters. Pt reports pain in wrists, acceptable level with current regimen. Voiding adequately. Last BM 5/2 per pt. Independent in the room.      P: Continue to monitor Pt status and report changes to treatment team.     [id number]  Rajani Holman RN on 5/3/2021 at 11:00 AM

## 2021-05-03 NOTE — PROCEDURES
The patient's HeartMate LVAD was interrogated 5/3/2021  * Speed 9600 rpm   * Pulsatility index 3.5-6.3   * Power 6.2-7.4 Manuel   * Flow 5-7.2 L/minute   Fluid status: euvolemic   Alarms were reviewed, with no LVAD alarms or signs of pump malfunction.   The driveline exit site was covered, with dressing c/d/i.   All external components were inspected and showed no evidence of damage or malfunction, none replaced.   No changes to VAD settings made.        Shelia Means DNP, FNP-BC, CHFN  Advanced Heart Failure Nurse Practitioner  [facility]

## 2021-05-03 NOTE — PLAN OF CARE
D: Admitted for worsening functional status and renal function concerning for worsening HF, now listed status 2E for transplant.  Hx of NICM EF 20% c/b VT s/p CRT-D s/p HMII LVAD 6/19/2017 originally intended as destination therapy 2/2 obesity however with recent weight loss now candidate for transplantation.     I: Monitored vitals and assessed pt status.   Changed: LVAD dressing changed.     A: A0x4. VSS on RA. Afebrile. Urinating adequately. Pt denied pain this shift.       P: Continue to monitor pt status and report changes to cards 2.

## 2021-05-03 NOTE — CONSULTS
Podiatry Consult    Inpatient consult received for toenail clippings on patient's bilateral feet.      No infectious or urgent concerns.     I have forwarded the consult to our podiatry providers. Plan is for podiatry to see the patient on 5/6/21.     In the meantime, recommend well-fitting shoes and continue WBAT.    Patient was not seen or evaluated today by myself.     Tiffany Alexander MD   Orthopedic Surgery, PGY-1  P: [phone]

## 2021-05-03 NOTE — PROGRESS NOTES
[facility]   Cardiology II Service / Advanced Heart Failure  Daily Progress Note  Date of Service: 5/3/2021      Patient: Jim Willingham  MRN: [id number]  Admission Date: 2/8/2021  Hospital Day # 84       Assessment and Plan:   Mr. Jim Willingham is a 65 year old male with history of NICM EF 20% c/b VT s/p CRT-D s/p HMII LVAD 6/19/2017 originally intended as destination therapy 2/2 obesity however with recent weight loss now candidate for transplantation. He is admitted for worsening functional status and renal function concerning for worsening heart failure. He is now listed status 2E for transplant.      Today's plan:   - podiatry consult today --> scheduled to see patient on 5/6       Chronic systolic heart failure secondary to NICM s/p HM II LVAD. Echo 1/8/21 at 9400 rpm, EF<30%m LVIDd 6.8cm, at least mildly reduced RV function, AoV closed without AI, mild-mod eccentric MR, dilated IVC without collapse. RHC 3/23 RA 10 mPA 25 mPCWP 14 Kee CO/CI 6.5/3.1.  Stage D, NYHA Class III  ACEi/ARB contraindicated due to renal dysfunction. Isordil 10 mg po TID. Hydralazine 75 mg po TID.   BB Contraindicated due to low cardiac output.   Aldosterone antagonist contraindicated due to renal dysfunction  SCD prophylaxis CRT-D   Fluid status: Near Euvolemia. Continue Bumex 4 mg po BID.  MAP: 60's - 90's  LDH: 319 4/23  Anticoagulation: Coumadin per pharmacy. INR- 2.31, goal 2.5-3 (goal increased in setting of power spikes and elevated flows this hospitalization.)  Antiplatelet: ASA 81 mg po daily  - remains on the cardiac transplant list status 2E. Persistent escalation of diuretics in setting of progressive heart failure with refractory hypervolemia.      WALTER on CKD Stage III. Cr peaked at 2.22.  - Cr- 1.81 (1.53)  - diuresis as noted above     History of Afib. History of VT/VF. Asymptomatic bursts of questionable AF vs NSVT without hemodynamic compromise. RHC 3/23 RA 10 mPA 25 mPCWP 14 Kee CO/CI 6.5/3.1.  -  Continue Amiodarone 200 mg po daily.   - Coumadin as above.      DM type II, controlled. Symptomatic Hypoglycemia, resolved. Low Cortisol: Hgb A1C-6.2. Lantus and Novololg sliding scale insulin discontinued. Endo consult appreciated. C-peptide 13.9 an Proinsulin 18.4. Serum cortisol 7.7 3/6/21, cosyntropin stim test WNL per Endo.   - Per endo --> Fingerstick glucose might not be accurate for hypoglycemia, if patient has glucose <55 (without insulin), plasma glucose should be sent for confirmation of hypoglycemia prior to correction. While the patient is off insulin therapy, POC glucose readings above 60 are acceptable for the patient.   Stable POC blood glucoses without overnight hypoglycemia.    - Continue Acarbose 25 mg po in AM add evening dose given overnight hypoglycemia.      Carpel tunnel, bilateral s/p bilateral release. Evidence of thenar atrophy. Relief with steroid injection 11/20. Carpal tunnel release 2/18/21. Appreciate Ortho/Plastics consult, signed off. Continue Tylenol 650 mg po QID and Gabapentin 300 mg in AM, 600 mg in the afternoon, and 600 mg in the evening.   - Lidocaine gel prn.   - Appreciate Plastic surgery consult, signed off.   - Tramadol prn.  - Elavil 37.5 mg po at HS.  - Hand braces and therapy consult appreciated.   - Flexeril 5 mg TID PRN    Neutropenia.   - WBC stable at 4.2 on 5/3/21 (3.6). Diff with ANC of 1.5.   - continue anakinra every other day as discussed with rheumatology     Chronic/resolved conditions  COPD/Asthma: pta Breo Ellipta, albuterol prn.   Depression: pta Bupropion  Right sided weakness, resolved. CT head negative for acute findings. Appreciate Neuro eval.   Staph Hominis Bacteremia. No need for surveillance blood cx per transplant ID.    Rhinovirus, resolved. Bacterial bronchitis. Completed 5 day course of Cefdinir. COVID negative 2/8. Resp PCR +rhinovirus. CXR 2/12 with no overt PNA. Stopped cefdinir 2/15. Cleared for transplant per ID. Repeat RVP negative  "3/5. COVID repeated due to sinus congestion, negative. Congestion improved with transition from Claritin to Zyrtec.       FEN: 3 gram sodium diet   PROPHY:  Coumadin  LINES:  PIV   DISPO:  TBD pending cardiac transplant.   CODE STATUS: Full Code   ================================================================  Interval History/ROS: Feeling improved from yesterday. Feels less conscious of breathing and less bloated in the abdomen, but still a bit tighter than normal. Continues to have \"a lot\" of urine output. Denies SOB, significant RESENDIZ, orthopnea, PND, CP, palpitations, fever/chills, cough, sore throat, dizziness, N/V/D, constipation, HA, vision changes, or signs of bleeding.        Last 24 hr care team notes reviewed.   ROS:  4 point ROS including Respiratory, CV, GI and , other than that noted in the HPI, is negative.     Medications: Reviewed in EPIC.     Physical Exam:   BP (!) 103/92 (BP Location: Left arm)   Pulse 64   Temp 97.9  F (36.6  C) (Axillary)   Resp 18   Ht 1.727 m (5' 8\")   Wt 102.2 kg (225 lb 4.8 oz)   SpO2 98%   BMI 34.26 kg/m    GENERAL: Appears alert and oriented times three.   HEENT: Eye symmetrical and free of discharge bilaterally. Mucous membranes moist and without lesions.  NECK: Supple and without lymphadenopathy. JVD at clavicle when sitting upright at 90 degrees.   CV: RRR, S1S2 present with LVAD hum.   RESPIRATORY: Respirations regular, even, and unlabored. Lungs CTA throughout.   GI: Soft and mildly distended with normoactive bowel sounds present in all quadrants. No tenderness, rebound, guarding. No organomegaly.   EXTREMITIES: Trace bilateral LE peripheral edema. 2+ bilateral pedal pulses.   NEUROLOGIC: Alert and oriented x 3. CN II-XII grossly intact. No focal deficits.   MUSCULOSKELETAL: No joint swelling or tenderness.   SKIN: No jaundice. No rashes or lesions. LVAD drive line covered.     Data:  CMP  Recent Labs   Lab 05/03/21  0629 05/02/21  0710 05/01/21  0701 " 04/30/21  0514    134 136 135   POTASSIUM 3.9 3.9 4.0 3.9   CHLORIDE 102 100 103 101   CO2 26 26 28 28   ANIONGAP 7 8 6 6   GLC 89 115* 90 94   BUN 56* 54* 52* 55*   CR 1.81* 1.71* 1.53* 1.70*   GFRESTIMATED 39* 41* 47* 42*   GFRESTBLACK 45* 48* 55* 48*   QAMAR 8.8 8.6 8.5 8.4*   MAG 2.5* 2.6* 2.5* 2.6*   PROTTOTAL 6.5*  --   --   --    ALBUMIN 3.6  --   --   --    BILITOTAL 0.7  --   --   --    ALKPHOS 100  --   --   --    AST 42  --   --   --    ALT 32  --   --   --      CBC  Recent Labs   Lab 05/03/21  0629 04/30/21  0514 04/29/21  0555   WBC 4.2 4.2 3.6*   RBC 4.03* 3.81* 3.87*   HGB 12.3* 11.2* 11.9*   HCT 38.7* 36.2* 37.0*   MCV 96 95 96   MCH 30.5 29.4 30.7   MCHC 31.8 30.9* 32.2   RDW 14.6 14.8 14.7    173 176     INR  Recent Labs   Lab 05/03/21  0629 05/02/21  0710 05/01/21  0701 04/30/21  0514   INR 2.31* 2.59* 2.84* 2.95*           Belkys Johansen RN-BSN, DNP-Student      The patient was seen and evaluated with Belkys Johansen DNP student.  I agree with the information noted above.        Shelia Means DNP, FNP-BC, CHFN  Advanced Heart Failure Nurse Practitioner  [facility]

## 2021-05-04 ENCOUNTER — APPOINTMENT (OUTPATIENT)
Dept: GENERAL RADIOLOGY | Facility: CLINIC | Age: 66
DRG: 001 | End: 2021-05-04
Attending: INTERNAL MEDICINE
Payer: COMMERCIAL

## 2021-05-04 ENCOUNTER — ORGAN (OUTPATIENT)
Dept: TRANSPLANT | Facility: CLINIC | Age: 66
End: 2021-05-04

## 2021-05-04 ENCOUNTER — DOCUMENTATION ONLY (OUTPATIENT)
Dept: TRANSPLANT | Facility: CLINIC | Age: 66
End: 2021-05-04

## 2021-05-04 ENCOUNTER — RESULTS ONLY (OUTPATIENT)
Dept: OTHER | Facility: CLINIC | Age: 66
End: 2021-05-04

## 2021-05-04 DIAGNOSIS — Z76.82 AWAITING ORGAN TRANSPLANT: Primary | ICD-10-CM

## 2021-05-04 LAB
ALBUMIN SERPL-MCNC: 4 G/DL (ref 3.4–5)
ALP SERPL-CCNC: 111 U/L (ref 40–150)
ALT SERPL W P-5'-P-CCNC: 40 U/L (ref 0–70)
AMYLASE SERPL-CCNC: 93 U/L (ref 30–110)
ANION GAP SERPL CALCULATED.3IONS-SCNC: 7 MMOL/L (ref 3–14)
AST SERPL W P-5'-P-CCNC: 47 U/L (ref 0–45)
BASOPHILS # BLD AUTO: 0.1 10E9/L (ref 0–0.2)
BASOPHILS NFR BLD AUTO: 1.8 %
BILIRUB SERPL-MCNC: 0.6 MG/DL (ref 0.2–1.3)
BUN SERPL-MCNC: 50 MG/DL (ref 7–30)
CALCIUM SERPL-MCNC: 9.1 MG/DL (ref 8.5–10.1)
CHLORIDE SERPL-SCNC: 101 MMOL/L (ref 94–109)
CO2 SERPL-SCNC: 26 MMOL/L (ref 20–32)
CREAT SERPL-MCNC: 1.6 MG/DL (ref 0.66–1.25)
DIFFERENTIAL METHOD BLD: ABNORMAL
EOSINOPHIL # BLD AUTO: 0.2 10E9/L (ref 0–0.7)
EOSINOPHIL NFR BLD AUTO: 5.1 %
ERYTHROCYTE [DISTWIDTH] IN BLOOD BY AUTOMATED COUNT: 14.7 % (ref 10–15)
GFR SERPL CREATININE-BSD FRML MDRD: 45 ML/MIN/{1.73_M2}
GLUCOSE BLDC GLUCOMTR-MCNC: 143 MG/DL (ref 70–99)
GLUCOSE BLDC GLUCOMTR-MCNC: 147 MG/DL (ref 70–99)
GLUCOSE BLDC GLUCOMTR-MCNC: 154 MG/DL (ref 70–99)
GLUCOSE BLDC GLUCOMTR-MCNC: 96 MG/DL (ref 70–99)
GLUCOSE SERPL-MCNC: 115 MG/DL (ref 70–99)
HBA1C MFR BLD: 5.1 % (ref 0–5.6)
HCT VFR BLD AUTO: 40.4 % (ref 40–53)
HGB BLD-MCNC: 12.8 G/DL (ref 13.3–17.7)
IMM GRANULOCYTES # BLD: 0 10E9/L (ref 0–0.4)
IMM GRANULOCYTES NFR BLD: 0.2 %
LYMPHOCYTES # BLD AUTO: 1.9 10E9/L (ref 0.8–5.3)
LYMPHOCYTES NFR BLD AUTO: 41.3 %
MAGNESIUM SERPL-MCNC: 2.3 MG/DL (ref 1.6–2.3)
MAGNESIUM SERPL-MCNC: 2.4 MG/DL (ref 1.6–2.3)
MCH RBC QN AUTO: 29.4 PG (ref 26.5–33)
MCHC RBC AUTO-ENTMCNC: 31.7 G/DL (ref 31.5–36.5)
MCV RBC AUTO: 93 FL (ref 78–100)
MONOCYTES # BLD AUTO: 0.8 10E9/L (ref 0–1.3)
MONOCYTES NFR BLD AUTO: 16.7 %
NEUTROPHILS # BLD AUTO: 1.6 10E9/L (ref 1.6–8.3)
NEUTROPHILS NFR BLD AUTO: 34.9 %
NRBC # BLD AUTO: 0 10*3/UL
NRBC BLD AUTO-RTO: 0 /100
PHOSPHATE SERPL-MCNC: 3.8 MG/DL (ref 2.5–4.5)
PLATELET # BLD AUTO: 216 10E9/L (ref 150–450)
POTASSIUM SERPL-SCNC: 3.9 MMOL/L (ref 3.4–5.3)
POTASSIUM SERPL-SCNC: 4 MMOL/L (ref 3.4–5.3)
PROT SERPL-MCNC: 7.2 G/DL (ref 6.8–8.8)
RBC # BLD AUTO: 4.35 10E12/L (ref 4.4–5.9)
SODIUM SERPL-SCNC: 135 MMOL/L (ref 133–144)
WBC # BLD AUTO: 4.6 10E9/L (ref 4–11)

## 2021-05-04 PROCEDURE — 999N001017 HC STATISTIC GLUCOSE BY METER IP

## 2021-05-04 PROCEDURE — 86923 COMPATIBILITY TEST ELECTRIC: CPT

## 2021-05-04 PROCEDURE — 83036 HEMOGLOBIN GLYCOSYLATED A1C: CPT

## 2021-05-04 PROCEDURE — 86665 EPSTEIN-BARR CAPSID VCA: CPT

## 2021-05-04 PROCEDURE — 86901 BLOOD TYPING SEROLOGIC RH(D): CPT

## 2021-05-04 PROCEDURE — 86900 BLOOD TYPING SEROLOGIC ABO: CPT

## 2021-05-04 PROCEDURE — 250N000011 HC RX IP 250 OP 636

## 2021-05-04 PROCEDURE — 82150 ASSAY OF AMYLASE: CPT

## 2021-05-04 PROCEDURE — 86706 HEP B SURFACE ANTIBODY: CPT

## 2021-05-04 PROCEDURE — 250N000013 HC RX MED GY IP 250 OP 250 PS 637: Performed by: PHYSICIAN ASSISTANT

## 2021-05-04 PROCEDURE — 214N000001 HC R&B CCU UMMC

## 2021-05-04 PROCEDURE — 85610 PROTHROMBIN TIME: CPT

## 2021-05-04 PROCEDURE — 86644 CMV ANTIBODY: CPT

## 2021-05-04 PROCEDURE — 87389 HIV-1 AG W/HIV-1&-2 AB AG IA: CPT

## 2021-05-04 PROCEDURE — 83735 ASSAY OF MAGNESIUM: CPT | Performed by: NURSE PRACTITIONER

## 2021-05-04 PROCEDURE — 250N000013 HC RX MED GY IP 250 OP 250 PS 637: Performed by: STUDENT IN AN ORGANIZED HEALTH CARE EDUCATION/TRAINING PROGRAM

## 2021-05-04 PROCEDURE — 83735 ASSAY OF MAGNESIUM: CPT

## 2021-05-04 PROCEDURE — 250N000012 HC RX MED GY IP 250 OP 636 PS 637

## 2021-05-04 PROCEDURE — 99232 SBSQ HOSP IP/OBS MODERATE 35: CPT | Performed by: PHYSICIAN ASSISTANT

## 2021-05-04 PROCEDURE — 36415 COLL VENOUS BLD VENIPUNCTURE: CPT

## 2021-05-04 PROCEDURE — 71046 X-RAY EXAM CHEST 2 VIEWS: CPT

## 2021-05-04 PROCEDURE — 86850 RBC ANTIBODY SCREEN: CPT

## 2021-05-04 PROCEDURE — 36415 COLL VENOUS BLD VENIPUNCTURE: CPT | Performed by: NURSE PRACTITIONER

## 2021-05-04 PROCEDURE — 84132 ASSAY OF SERUM POTASSIUM: CPT | Performed by: NURSE PRACTITIONER

## 2021-05-04 PROCEDURE — 87516 HEPATITIS B DNA AMP PROBE: CPT

## 2021-05-04 PROCEDURE — 250N000013 HC RX MED GY IP 250 OP 250 PS 637: Performed by: NURSE PRACTITIONER

## 2021-05-04 PROCEDURE — 86803 HEPATITIS C AB TEST: CPT

## 2021-05-04 PROCEDURE — 84132 ASSAY OF SERUM POTASSIUM: CPT | Performed by: INTERNAL MEDICINE

## 2021-05-04 PROCEDURE — 258N000003 HC RX IP 258 OP 636

## 2021-05-04 PROCEDURE — 71046 X-RAY EXAM CHEST 2 VIEWS: CPT | Mod: 26 | Performed by: RADIOLOGY

## 2021-05-04 PROCEDURE — 85025 COMPLETE CBC W/AUTO DIFF WBC: CPT

## 2021-05-04 PROCEDURE — 86704 HEP B CORE ANTIBODY TOTAL: CPT

## 2021-05-04 PROCEDURE — U0005 INFEC AGEN DETEC AMPLI PROBE: HCPCS | Performed by: INTERNAL MEDICINE

## 2021-05-04 PROCEDURE — 84100 ASSAY OF PHOSPHORUS: CPT

## 2021-05-04 PROCEDURE — 80053 COMPREHEN METABOLIC PANEL: CPT

## 2021-05-04 PROCEDURE — U0003 INFECTIOUS AGENT DETECTION BY NUCLEIC ACID (DNA OR RNA); SEVERE ACUTE RESPIRATORY SYNDROME CORONAVIRUS 2 (SARS-COV-2) (CORONAVIRUS DISEASE [COVID-19]), AMPLIFIED PROBE TECHNIQUE, MAKING USE OF HIGH THROUGHPUT TECHNOLOGIES AS DESCRIBED BY CMS-2020-01-R: HCPCS | Performed by: INTERNAL MEDICINE

## 2021-05-04 PROCEDURE — 250N000013 HC RX MED GY IP 250 OP 250 PS 637: Performed by: INTERNAL MEDICINE

## 2021-05-04 PROCEDURE — 85730 THROMBOPLASTIN TIME PARTIAL: CPT

## 2021-05-04 PROCEDURE — 87521 HEPATITIS C PROBE&RVRS TRNSC: CPT

## 2021-05-04 PROCEDURE — 87340 HEPATITIS B SURFACE AG IA: CPT

## 2021-05-04 PROCEDURE — 87535 HIV-1 PROBE&REVERSE TRNSCRPJ: CPT

## 2021-05-04 RX ORDER — CEFTRIAXONE 2 G/1
2 INJECTION, POWDER, FOR SOLUTION INTRAMUSCULAR; INTRAVENOUS
Status: COMPLETED | OUTPATIENT
Start: 2021-05-04 | End: 2021-05-05

## 2021-05-04 RX ORDER — WARFARIN SODIUM 4 MG/1
8 TABLET ORAL
Status: COMPLETED | OUTPATIENT
Start: 2021-05-04 | End: 2021-05-04

## 2021-05-04 RX ORDER — LIDOCAINE 40 MG/G
CREAM TOPICAL
Status: DISCONTINUED | OUTPATIENT
Start: 2021-05-04 | End: 2021-05-06 | Stop reason: HOSPADM

## 2021-05-04 RX ORDER — MYCOPHENOLATE MOFETIL 250 MG/1
1500 CAPSULE ORAL EVERY 12 HOURS
Status: DISCONTINUED | OUTPATIENT
Start: 2021-05-04 | End: 2021-05-06 | Stop reason: HOSPADM

## 2021-05-04 RX ADMIN — ACARBOSE 25 MG: 25 TABLET ORAL at 18:14

## 2021-05-04 RX ADMIN — BUMETANIDE 4 MG: 2 TABLET ORAL at 16:16

## 2021-05-04 RX ADMIN — UMECLIDINIUM 1 PUFF: 62.5 AEROSOL, POWDER ORAL at 09:34

## 2021-05-04 RX ADMIN — Medication 50 MG: at 13:14

## 2021-05-04 RX ADMIN — ISOSORBIDE DINITRATE 10 MG: 10 TABLET ORAL at 09:28

## 2021-05-04 RX ADMIN — WARFARIN SODIUM 8 MG: 4 TABLET ORAL at 18:14

## 2021-05-04 RX ADMIN — FLUTICASONE FUROATE AND VILANTEROL TRIFENATATE 1 PUFF: 100; 25 POWDER RESPIRATORY (INHALATION) at 09:34

## 2021-05-04 RX ADMIN — ALLOPURINOL 300 MG: 300 TABLET ORAL at 09:27

## 2021-05-04 RX ADMIN — ISOSORBIDE DINITRATE 10 MG: 10 TABLET ORAL at 19:52

## 2021-05-04 RX ADMIN — Medication 37.5 MG: at 23:55

## 2021-05-04 RX ADMIN — ISOSORBIDE DINITRATE 10 MG: 10 TABLET ORAL at 13:15

## 2021-05-04 RX ADMIN — Medication 50 MG: at 06:21

## 2021-05-04 RX ADMIN — ACETAMINOPHEN 650 MG: 325 TABLET, FILM COATED ORAL at 06:21

## 2021-05-04 RX ADMIN — ACARBOSE 25 MG: 25 TABLET ORAL at 09:26

## 2021-05-04 RX ADMIN — HYDRALAZINE HYDROCHLORIDE 75 MG: 50 TABLET ORAL at 09:27

## 2021-05-04 RX ADMIN — BUPROPION HYDROCHLORIDE 100 MG: 100 TABLET, EXTENDED RELEASE ORAL at 19:52

## 2021-05-04 RX ADMIN — BUPROPION HYDROCHLORIDE 100 MG: 100 TABLET, EXTENDED RELEASE ORAL at 09:25

## 2021-05-04 RX ADMIN — POTASSIUM CHLORIDE 40 MEQ: 750 TABLET, EXTENDED RELEASE ORAL at 19:52

## 2021-05-04 RX ADMIN — DOCUSATE SODIUM 50 MG AND SENNOSIDES 8.6 MG 2 TABLET: 8.6; 5 TABLET, FILM COATED ORAL at 19:54

## 2021-05-04 RX ADMIN — POTASSIUM CHLORIDE 40 MEQ: 750 TABLET, EXTENDED RELEASE ORAL at 09:28

## 2021-05-04 RX ADMIN — HYDRALAZINE HYDROCHLORIDE 75 MG: 50 TABLET ORAL at 13:15

## 2021-05-04 RX ADMIN — Medication 50 MG: at 23:30

## 2021-05-04 RX ADMIN — GABAPENTIN 600 MG: 300 CAPSULE ORAL at 23:30

## 2021-05-04 RX ADMIN — Medication 10 MG: at 23:31

## 2021-05-04 RX ADMIN — PHYTONADIONE 5 MG: 10 INJECTION, EMULSION INTRAMUSCULAR; INTRAVENOUS; SUBCUTANEOUS at 23:35

## 2021-05-04 RX ADMIN — ACETAMINOPHEN 650 MG: 325 TABLET, FILM COATED ORAL at 23:30

## 2021-05-04 RX ADMIN — CETIRIZINE HYDROCHLORIDE 10 MG: 10 TABLET, FILM COATED ORAL at 09:30

## 2021-05-04 RX ADMIN — ASPIRIN 81 MG CHEWABLE TABLET 81 MG: 81 TABLET CHEWABLE at 09:29

## 2021-05-04 RX ADMIN — ACETAMINOPHEN 650 MG: 325 TABLET, FILM COATED ORAL at 13:14

## 2021-05-04 RX ADMIN — GABAPENTIN 300 MG: 300 CAPSULE ORAL at 09:29

## 2021-05-04 RX ADMIN — GABAPENTIN 600 MG: 300 CAPSULE ORAL at 13:14

## 2021-05-04 RX ADMIN — MONTELUKAST 10 MG: 10 TABLET, FILM COATED ORAL at 23:30

## 2021-05-04 RX ADMIN — HYDRALAZINE HYDROCHLORIDE 75 MG: 50 TABLET ORAL at 19:52

## 2021-05-04 RX ADMIN — BUMETANIDE 4 MG: 2 TABLET ORAL at 09:30

## 2021-05-04 RX ADMIN — MYCOPHENOLATE MOFETIL 1500 MG: 250 CAPSULE ORAL at 23:31

## 2021-05-04 RX ADMIN — AMIODARONE HYDROCHLORIDE 200 MG: 200 TABLET ORAL at 09:45

## 2021-05-04 ASSESSMENT — ACTIVITIES OF DAILY LIVING (ADL)
ADLS_ACUITY_SCORE: 12

## 2021-05-04 ASSESSMENT — MIFFLIN-ST. JEOR: SCORE: 1787.37

## 2021-05-04 NOTE — PLAN OF CARE
D: Admitted 2/08 for worsening functional status and renal function concerning for worsening heart failure. Now listed status 2E on heart transplant list.   PMHx: COPD, CKD3, DM2, NICM (EF 20%), VT/VF s/p CRT-D s/p LVAD HM2 2017 initially as destination therapy d/t obesity, but pt now eligible for transplant due to weight loss.     I: Monitored vitals and assessed pt status.   PRN: Tramadol, Tylenol  Tele: SR w/ 1st degree AVB  O2: RA, home CPAP at night  Mobility: independent     A: A0x4. VSS. LVAD #'s WNL. No alarms overnight. Afebrile. Urinating adequately. LBM 5/3. 3g Na diet. Q4 FSG. Do not disturb orders respected from [id number]. C/o hand and shoulder pain. Splints in place on hands. Able to make needs known.      P: Continue to monitor Pt status and report changes to Cards 2.

## 2021-05-04 NOTE — PROGRESS NOTES
[facility]   Cardiology II Service / Advanced Heart Failure  Daily Progress Note        Patient: Jim Willingham  MRN: [id number]  Admission Date: 2/8/2021  Hospital Day # 85       Assessment and Plan:   Mr. Jim Willingham is a 65 year old male with history of NICM EF 20% c/b VT s/p CRT-D s/p HMII LVAD 6/19/2017 originally intended as destination therapy 2/2 obesity however with recent weight loss now candidate for transplantation. He is admitted for worsening functional status and renal function concerning for worsening heart failure. He is now listed status 2E for transplant.      Today's plan:   - podiatry consult today --> scheduled to see patient on 5/6  - no changes to diuretics      Chronic systolic heart failure secondary to NICM s/p HM II LVAD. Echo 1/8/21 at 9400 rpm, EF<30%m LVIDd 6.8cm, at least mildly reduced RV function, AoV closed without AI, mild-mod eccentric MR, dilated IVC without collapse. RHC 3/23 RA 10 mPA 25 mPCWP 14 Kee CO/CI 6.5/3.1.  Stage D, NYHA Class III  ACEi/ARB contraindicated due to renal dysfunction. Isordil 10 mg po TID. Hydralazine 75 mg po TID.   BB Contraindicated due to low cardiac output.   Aldosterone antagonist contraindicated due to renal dysfunction  SCD prophylaxis CRT-D   Fluid status: Near Euvolemia. Continue Bumex 4 mg po BID.  MAP: 60's - 91  LDH: 319 4/23  Anticoagulation: Coumadin per pharmacy. INR- 2 intermittent checks, goal 2.5-3 (goal increased in setting of power spikes and elevated flows this hospitalization.)  Antiplatelet: ASA 81 mg po daily  - remains on the cardiac transplant list status 2E. Persistent escalation of diuretics in setting of progressive heart failure with refractory hypervolemia.      WALTER on CKD Stage III.   - Cr- 1.81, recheck tomorrow  - diuresis as noted above     History of Afib. History of VT/VF. Asymptomatic bursts of questionable AF vs NSVT without hemodynamic compromise. RHC 3/23 RA 10 mPA 25 mPCWP 14 Kee CO/CI  6.5/3.1.  - Continue Amiodarone 200 mg po daily.   - Coumadin as above.      DM type II, controlled. Symptomatic Hypoglycemia, resolved. Low Cortisol: Hgb A1C-6.2. Lantus and Novololg sliding scale insulin discontinued. Endo consult appreciated. C-peptide 13.9 an Proinsulin 18.4. Serum cortisol 7.7 3/6/21, cosyntropin stim test WNL per Endo.   - Per endo --> Fingerstick glucose might not be accurate for hypoglycemia, if patient has glucose <55 (without insulin), plasma glucose should be sent for confirmation of hypoglycemia prior to correction. While the patient is off insulin therapy, POC glucose readings above 60 are acceptable for the patient.   Stable POC blood glucoses without overnight hypoglycemia.    - Continue Acarbose 25 mg po in AM add evening dose given overnight hypoglycemia.      Carpel tunnel, bilateral s/p bilateral release. Evidence of thenar atrophy. Relief with steroid injection 11/20. Carpal tunnel release 2/18/21. Appreciate Ortho/Plastics consult, signed off. Continue Tylenol 650 mg po QID and Gabapentin 300 mg in AM, 600 mg in the afternoon, and 600 mg in the evening.   - Lidocaine gel prn.   - Appreciate Plastic surgery consult, signed off.   - Tramadol prn.  - Elavil 37.5 mg po at HS.  - Hand braces and therapy consult appreciated.   - Flexeril 5 mg TID PRN    Neutropenia.   - WBC stable at 4.2 on 5/3/21 (3.6). Diff with ANC of 1.5.   - continue anakinra every other day as discussed with rheumatology     Chronic/resolved conditions  COPD/Asthma: pta Breo Ellipta, albuterol prn.   Depression: pta Bupropion  Right sided weakness, resolved. CT head negative for acute findings. Appreciate Neuro eval.   Staph Hominis Bacteremia. No need for surveillance blood cx per transplant ID.    Rhinovirus, resolved. Bacterial bronchitis. Completed 5 day course of Cefdinir. COVID negative 2/8. Resp PCR +rhinovirus. CXR 2/12 with no overt PNA. Stopped cefdinir 2/15. Cleared for transplant per ID. Repeat RVP  "negative 3/5. COVID repeated due to sinus congestion, negative. Congestion improved with transition from Claritin to Zyrtec.       FEN: 3 gram sodium diet   PROPHY:  Coumadin  LINES:  PIV   DISPO:  TBD pending cardiac transplant.   CODE STATUS: Full Code   ================================================================  Interval History/ROS: Feeling okay today. Mild abd bloating and le edema. Continues to have \"a lot\" of urine output. Denies SOB, significant RESENDIZ, orthopnea, PND, CP, palpitations, fever/chills, cough, sore throat, dizziness, N/V/D, constipation, HA, vision changes, or signs of bleeding.        Last 24 hr care team notes reviewed.   ROS:  4 point ROS including Respiratory, CV, GI and , other than that noted in the HPI, is negative.     Medications: Reviewed in EPIC.     Physical Exam:   BP 95/76 (BP Location: Right arm)   Pulse 85   Temp 98.1  F (36.7  C) (Oral)   Resp 18   Ht 1.727 m (5' 8\")   Wt 101.8 kg (224 lb 6.4 oz)   SpO2 96%   BMI 34.12 kg/m    GENERAL: Appears alert and interacting appropriatly.  HEENT: Eye symmetrical and free of discharge bilaterally. Mucous membranes moist and without lesions.  NECK: Supple and without lymphadenopathy. JVD at clavicle when sitting upright at 90 degrees.   CV: RRR, S1S2 present with LVAD hum.   RESPIRATORY: Respirations regular, even, and unlabored. Lungs CTA throughout.   GI: Soft and mildly distended with normoactive bowel sounds present in all quadrants. No tenderness, rebound, guarding. No organomegaly.   EXTREMITIES: Trace bilateral LE peripheral edema. 2+ bilateral pedal pulses.   NEUROLOGIC: Alert and nteracting appropriatly.. No focal deficits.   MUSCULOSKELETAL: No joint swelling or tenderness.   SKIN: No jaundice. No rashes or lesions. LVAD drive line covered.     Data:  CMP  Recent Labs   Lab 05/04/21  0623 05/03/21  0629 05/02/21  0710 05/01/21  0701 04/30/21  0514   NA  --  135 134 136 135   POTASSIUM 3.9 3.9 3.9 4.0 3.9   CHLORIDE  " --  102 100 103 101   CO2  --  26 26 28 28   ANIONGAP  --  7 8 6 6   GLC  --  89 115* 90 94   BUN  --  56* 54* 52* 55*   CR  --  1.81* 1.71* 1.53* 1.70*   GFRESTIMATED  --  39* 41* 47* 42*   GFRESTBLACK  --  45* 48* 55* 48*   QAMAR  --  8.8 8.6 8.5 8.4*   MAG 2.4* 2.5* 2.6* 2.5* 2.6*   PROTTOTAL  --  6.5*  --   --   --    ALBUMIN  --  3.6  --   --   --    BILITOTAL  --  0.7  --   --   --    ALKPHOS  --  100  --   --   --    AST  --  42  --   --   --    ALT  --  32  --   --   --      CBC  Recent Labs   Lab 05/03/21  0629 04/30/21  0514 04/29/21  0555   WBC 4.2 4.2 3.6*   RBC 4.03* 3.81* 3.87*   HGB 12.3* 11.2* 11.9*   HCT 38.7* 36.2* 37.0*   MCV 96 95 96   MCH 30.5 29.4 30.7   MCHC 31.8 30.9* 32.2   RDW 14.6 14.8 14.7    173 176     INR  Recent Labs   Lab 05/03/21  0629 05/02/21  0710 05/01/21  0701 04/30/21  0514   INR 2.31* 2.59* 2.84* 2.95*           Didi Butler PA-C  [facility]Ardiology

## 2021-05-04 NOTE — PLAN OF CARE
D:pt with quiet affect, reflected how covid had effected people this last year  A:pt stated he can not get covid shot until three months after transplant, Pt is family members are trying to get shots  I:talked about where places and systems are providing shots  P:[facility]

## 2021-05-04 NOTE — PLAN OF CARE
Admitted with worsening functional status and renal function due to HF.  PMH:   COPD, CKD3, DM2, NICM (EF 20%), VT/VF s/p CRT-D s/p LVAD HM2 2017 initially as destination therapy d/t obesity, but pt now eligible for transplant due to weight loss.     Code status: Full Code    Team: CVTS     Neuro: AOX4, Calm and cooperative and uses the call light appropriately  Cardiac/Tele: SR on Tele with LVAD and Pacemaker. VSS appropriate for patient  Respiratory: Lung sounds diminished, Sats 97% on RA.   GI/: Voiding adequately. BS checks   Diet/Appetite: 3gm Na diet  Skin: Appropriate or race. No skin breakdown  Activity: Independent   Pain: Pt denies pain. PRN Tramadol and Tylennol for pain     Plan: Continue to monitor Pt status and report changes to Cards 2.

## 2021-05-05 ENCOUNTER — ANESTHESIA (OUTPATIENT)
Dept: SURGERY | Facility: CLINIC | Age: 66
DRG: 001 | End: 2021-05-05
Payer: COMMERCIAL

## 2021-05-05 ENCOUNTER — ANESTHESIA EVENT (OUTPATIENT)
Dept: SURGERY | Facility: CLINIC | Age: 66
DRG: 001 | End: 2021-05-05
Payer: COMMERCIAL

## 2021-05-05 LAB
ABO + RH BLD: NORMAL
ABO + RH BLD: NORMAL
ALBUMIN UR-MCNC: 10 MG/DL
ANION GAP SERPL CALCULATED.3IONS-SCNC: 7 MMOL/L (ref 3–14)
APPEARANCE UR: CLEAR
APTT PPP: 37 SEC (ref 22–37)
BASOPHILS # BLD AUTO: 0 10E9/L (ref 0–0.2)
BASOPHILS NFR BLD AUTO: 0 %
BILIRUB UR QL STRIP: NEGATIVE
BLD GP AB SCN SERPL QL: NORMAL
BLD PROD TYP BPU: NORMAL
BLD UNIT ID BPU: 0
BLOOD BANK CMNT PATIENT-IMP: NORMAL
BLOOD PRODUCT CODE: NORMAL
BPU ID: NORMAL
BUN SERPL-MCNC: 49 MG/DL (ref 7–30)
CALCIUM SERPL-MCNC: 9.4 MG/DL (ref 8.5–10.1)
CHLORIDE SERPL-SCNC: 100 MMOL/L (ref 94–109)
CMV IGG SERPL QL IA: >8 AI (ref 0–0.8)
CO2 SERPL-SCNC: 25 MMOL/L (ref 20–32)
COLOR UR AUTO: YELLOW
CREAT SERPL-MCNC: 1.59 MG/DL (ref 0.66–1.25)
DIFFERENTIAL METHOD BLD: ABNORMAL
EBV VCA IGG SER QL IA: 5.4 AI (ref 0–0.8)
EOSINOPHIL # BLD AUTO: 0 10E9/L (ref 0–0.7)
EOSINOPHIL NFR BLD AUTO: 0 %
ERYTHROCYTE [DISTWIDTH] IN BLOOD BY AUTOMATED COUNT: 14.5 % (ref 10–15)
ERYTHROCYTE [DISTWIDTH] IN BLOOD BY AUTOMATED COUNT: 14.6 % (ref 10–15)
GFR SERPL CREATININE-BSD FRML MDRD: 45 ML/MIN/{1.73_M2}
GLUCOSE BLDC GLUCOMTR-MCNC: 155 MG/DL (ref 70–99)
GLUCOSE BLDC GLUCOMTR-MCNC: 173 MG/DL (ref 70–99)
GLUCOSE BLDC GLUCOMTR-MCNC: 179 MG/DL (ref 70–99)
GLUCOSE BLDC GLUCOMTR-MCNC: 197 MG/DL (ref 70–99)
GLUCOSE BLDC GLUCOMTR-MCNC: 199 MG/DL (ref 70–99)
GLUCOSE BLDC GLUCOMTR-MCNC: 262 MG/DL (ref 70–99)
GLUCOSE SERPL-MCNC: 197 MG/DL (ref 70–99)
GLUCOSE UR STRIP-MCNC: NEGATIVE MG/DL
HBV CORE AB SERPL QL IA: NONREACTIVE
HBV SURFACE AB SERPL IA-ACNC: 0 M[IU]/ML
HBV SURFACE AG SERPL QL IA: NONREACTIVE
HCT VFR BLD AUTO: 40 % (ref 40–53)
HCT VFR BLD AUTO: 40.8 % (ref 40–53)
HCV AB SERPL QL IA: NONREACTIVE
HGB BLD-MCNC: 12.9 G/DL (ref 13.3–17.7)
HGB BLD-MCNC: 13.2 G/DL (ref 13.3–17.7)
HGB UR QL STRIP: NEGATIVE
HIV 1+2 AB+HIV1 P24 AG SERPL QL IA: NONREACTIVE
INR PPP: 1.49 (ref 0.86–1.14)
INR PPP: 1.57 (ref 0.86–1.14)
INR PPP: 2 (ref 0.86–1.14)
INTERPRETATION ECG - MUSE: NORMAL
KETONES UR STRIP-MCNC: NEGATIVE MG/DL
LABORATORY COMMENT REPORT: NORMAL
LEUKOCYTE ESTERASE UR QL STRIP: NEGATIVE
LYMPHOCYTES # BLD AUTO: 0.3 10E9/L (ref 0.8–5.3)
LYMPHOCYTES NFR BLD AUTO: 8.9 %
MAGNESIUM SERPL-MCNC: 2.3 MG/DL (ref 1.6–2.3)
MAGNESIUM SERPL-MCNC: 2.4 MG/DL (ref 1.6–2.3)
MCH RBC QN AUTO: 29.9 PG (ref 26.5–33)
MCH RBC QN AUTO: 30.2 PG (ref 26.5–33)
MCHC RBC AUTO-ENTMCNC: 32.3 G/DL (ref 31.5–36.5)
MCHC RBC AUTO-ENTMCNC: 32.4 G/DL (ref 31.5–36.5)
MCV RBC AUTO: 93 FL (ref 78–100)
MCV RBC AUTO: 93 FL (ref 78–100)
MONOCYTES # BLD AUTO: 0 10E9/L (ref 0–1.3)
MONOCYTES NFR BLD AUTO: 0 %
MUCOUS THREADS #/AREA URNS LPF: PRESENT /LPF
NEUTROPHILS # BLD AUTO: 3.2 10E9/L (ref 1.6–8.3)
NEUTROPHILS NFR BLD AUTO: 91.1 %
NITRATE UR QL: NEGATIVE
NRBC # BLD AUTO: 0 10*3/UL
NRBC BLD AUTO-RTO: 1 /100
NUM BPU REQUESTED: 2
NUM BPU REQUESTED: 4
PH UR STRIP: 6 PH (ref 5–7)
PLATELET # BLD AUTO: 210 10E9/L (ref 150–450)
PLATELET # BLD AUTO: 220 10E9/L (ref 150–450)
PLATELET # BLD EST: ABNORMAL 10*3/UL
POTASSIUM SERPL-SCNC: 4.5 MMOL/L (ref 3.4–5.3)
POTASSIUM SERPL-SCNC: 4.6 MMOL/L (ref 3.4–5.3)
RBC # BLD AUTO: 4.31 10E12/L (ref 4.4–5.9)
RBC # BLD AUTO: 4.37 10E12/L (ref 4.4–5.9)
RBC #/AREA URNS AUTO: <1 /HPF (ref 0–2)
RBC MORPH BLD: NORMAL
SARS-COV-2 RNA RESP QL NAA+PROBE: NEGATIVE
SODIUM SERPL-SCNC: 132 MMOL/L (ref 133–144)
SOURCE: ABNORMAL
SP GR UR STRIP: 1.01 (ref 1–1.03)
SPECIMEN EXP DATE BLD: NORMAL
SPECIMEN SOURCE: NORMAL
SQUAMOUS #/AREA URNS AUTO: 0 /HPF (ref 0–1)
TRANSFUSION STATUS PATIENT QL: NORMAL
TRANSFUSION STATUS PATIENT QL: NORMAL
UFH PPP CHRO-ACNC: 0.26 IU/ML
UROBILINOGEN UR STRIP-MCNC: NORMAL MG/DL (ref 0–2)
WBC # BLD AUTO: 3.2 10E9/L (ref 4–11)
WBC # BLD AUTO: 3.5 10E9/L (ref 4–11)
WBC #/AREA URNS AUTO: 0 /HPF (ref 0–5)

## 2021-05-05 PROCEDURE — 85610 PROTHROMBIN TIME: CPT | Performed by: PHYSICIAN ASSISTANT

## 2021-05-05 PROCEDURE — P9016 RBC LEUKOCYTES REDUCED: HCPCS

## 2021-05-05 PROCEDURE — 81001 URINALYSIS AUTO W/SCOPE: CPT

## 2021-05-05 PROCEDURE — 250N000011 HC RX IP 250 OP 636: Performed by: PHYSICIAN ASSISTANT

## 2021-05-05 PROCEDURE — 250N000013 HC RX MED GY IP 250 OP 250 PS 637: Performed by: NURSE PRACTITIONER

## 2021-05-05 PROCEDURE — 410N000003 HC PER-PERFUSION 1ST 30 MIN: Performed by: SURGERY

## 2021-05-05 PROCEDURE — 36415 COLL VENOUS BLD VENIPUNCTURE: CPT | Performed by: NURSE PRACTITIONER

## 2021-05-05 PROCEDURE — 258N000003 HC RX IP 258 OP 636

## 2021-05-05 PROCEDURE — 99232 SBSQ HOSP IP/OBS MODERATE 35: CPT | Mod: 25 | Performed by: PHYSICIAN ASSISTANT

## 2021-05-05 PROCEDURE — 272N000085 HC PACK CELL SAVER CSP: Performed by: SURGERY

## 2021-05-05 PROCEDURE — 999N000128 HC STATISTIC PERIPHERAL IV START W/O US GUIDANCE

## 2021-05-05 PROCEDURE — 258N000003 HC RX IP 258 OP 636: Performed by: STUDENT IN AN ORGANIZED HEALTH CARE EDUCATION/TRAINING PROGRAM

## 2021-05-05 PROCEDURE — 250N000011 HC RX IP 250 OP 636

## 2021-05-05 PROCEDURE — 93010 ELECTROCARDIOGRAM REPORT: CPT | Mod: 59 | Performed by: INTERNAL MEDICINE

## 2021-05-05 PROCEDURE — 272N000001 HC OR GENERAL SUPPLY STERILE: Performed by: SURGERY

## 2021-05-05 PROCEDURE — 93005 ELECTROCARDIOGRAM TRACING: CPT

## 2021-05-05 PROCEDURE — 250N000006 HC OR RX SURGIFLO W/THROMBIN KIT 2ML 1991 OPNP: Performed by: SURGERY

## 2021-05-05 PROCEDURE — 250N000009 HC RX 250: Performed by: STUDENT IN AN ORGANIZED HEALTH CARE EDUCATION/TRAINING PROGRAM

## 2021-05-05 PROCEDURE — 410N000004: Performed by: SURGERY

## 2021-05-05 PROCEDURE — 258N000003 HC RX IP 258 OP 636: Performed by: ANESTHESIOLOGY

## 2021-05-05 PROCEDURE — 250N000009 HC RX 250: Performed by: INTERNAL MEDICINE

## 2021-05-05 PROCEDURE — 250N000011 HC RX IP 250 OP 636: Performed by: STUDENT IN AN ORGANIZED HEALTH CARE EDUCATION/TRAINING PROGRAM

## 2021-05-05 PROCEDURE — 370N000017 HC ANESTHESIA TECHNICAL FEE, PER MIN: Performed by: SURGERY

## 2021-05-05 PROCEDURE — 250N000013 HC RX MED GY IP 250 OP 250 PS 637: Performed by: INTERNAL MEDICINE

## 2021-05-05 PROCEDURE — 85610 PROTHROMBIN TIME: CPT | Performed by: NURSE PRACTITIONER

## 2021-05-05 PROCEDURE — 93750 INTERROGATION VAD IN PERSON: CPT | Performed by: PHYSICIAN ASSISTANT

## 2021-05-05 PROCEDURE — 360N000079 HC SURGERY LEVEL 6, PER MIN: Performed by: SURGERY

## 2021-05-05 PROCEDURE — 250N000011 HC RX IP 250 OP 636: Performed by: INTERNAL MEDICINE

## 2021-05-05 PROCEDURE — 272N000002 HC OR SUPPLY OTHER OPNP: Performed by: SURGERY

## 2021-05-05 PROCEDURE — 85025 COMPLETE CBC W/AUTO DIFF WBC: CPT | Performed by: NURSE PRACTITIONER

## 2021-05-05 PROCEDURE — 999N001063 HC STATISTIC THAWING COMPONENT: Performed by: INTERNAL MEDICINE

## 2021-05-05 PROCEDURE — 250N000009 HC RX 250: Performed by: NURSE PRACTITIONER

## 2021-05-05 PROCEDURE — 83735 ASSAY OF MAGNESIUM: CPT | Performed by: NURSE PRACTITIONER

## 2021-05-05 PROCEDURE — 250N000011 HC RX IP 250 OP 636: Performed by: ANESTHESIOLOGY

## 2021-05-05 PROCEDURE — 85027 COMPLETE CBC AUTOMATED: CPT | Performed by: PHYSICIAN ASSISTANT

## 2021-05-05 PROCEDURE — 250N000024 HC ISOFLURANE, PER MIN: Performed by: SURGERY

## 2021-05-05 PROCEDURE — 85396 CLOTTING ASSAY WHOLE BLOOD: CPT | Performed by: INTERNAL MEDICINE

## 2021-05-05 PROCEDURE — 258N000003 HC RX IP 258 OP 636: Performed by: INTERNAL MEDICINE

## 2021-05-05 PROCEDURE — P9059 PLASMA, FRZ BETWEEN 8-24HOUR: HCPCS | Performed by: INTERNAL MEDICINE

## 2021-05-05 PROCEDURE — 250N000012 HC RX MED GY IP 250 OP 636 PS 637

## 2021-05-05 PROCEDURE — 250N000013 HC RX MED GY IP 250 OP 250 PS 637: Performed by: PHYSICIAN ASSISTANT

## 2021-05-05 PROCEDURE — 84132 ASSAY OF SERUM POTASSIUM: CPT | Performed by: PHYSICIAN ASSISTANT

## 2021-05-05 PROCEDURE — 83735 ASSAY OF MAGNESIUM: CPT | Performed by: PHYSICIAN ASSISTANT

## 2021-05-05 PROCEDURE — 85520 HEPARIN ASSAY: CPT | Performed by: PHYSICIAN ASSISTANT

## 2021-05-05 PROCEDURE — 36415 COLL VENOUS BLD VENIPUNCTURE: CPT | Performed by: PHYSICIAN ASSISTANT

## 2021-05-05 PROCEDURE — 214N000001 HC R&B CCU UMMC

## 2021-05-05 PROCEDURE — 999N000141 HC STATISTIC PRE-PROCEDURE NURSING ASSESSMENT: Performed by: SURGERY

## 2021-05-05 PROCEDURE — 272N000088 HC PUMP APP ADULT PERFUSION: Performed by: SURGERY

## 2021-05-05 PROCEDURE — 80048 BASIC METABOLIC PNL TOTAL CA: CPT | Performed by: NURSE PRACTITIONER

## 2021-05-05 PROCEDURE — 250N000013 HC RX MED GY IP 250 OP 250 PS 637: Performed by: STUDENT IN AN ORGANIZED HEALTH CARE EDUCATION/TRAINING PROGRAM

## 2021-05-05 PROCEDURE — 999N001017 HC STATISTIC GLUCOSE BY METER IP

## 2021-05-05 RX ORDER — SODIUM CHLORIDE, SODIUM GLUCONATE, SODIUM ACETATE, POTASSIUM CHLORIDE AND MAGNESIUM CHLORIDE 526; 502; 368; 37; 30 MG/100ML; MG/100ML; MG/100ML; MG/100ML; MG/100ML
INJECTION, SOLUTION INTRAVENOUS CONTINUOUS PRN
Status: DISCONTINUED | OUTPATIENT
Start: 2021-05-05 | End: 2021-05-06

## 2021-05-05 RX ORDER — NOREPINEPHRINE BITARTRATE 0.06 MG/ML
0.03-0.4 INJECTION, SOLUTION INTRAVENOUS CONTINUOUS
Status: DISCONTINUED | OUTPATIENT
Start: 2021-05-05 | End: 2021-05-06 | Stop reason: HOSPADM

## 2021-05-05 RX ORDER — HEPARIN SODIUM 10000 [USP'U]/100ML
0-5000 INJECTION, SOLUTION INTRAVENOUS CONTINUOUS
Status: DISPENSED | OUTPATIENT
Start: 2021-05-05 | End: 2021-05-05

## 2021-05-05 RX ORDER — PROPOFOL 10 MG/ML
INJECTION, EMULSION INTRAVENOUS PRN
Status: DISCONTINUED | OUTPATIENT
Start: 2021-05-05 | End: 2021-05-06

## 2021-05-05 RX ORDER — FENTANYL CITRATE 50 UG/ML
INJECTION, SOLUTION INTRAMUSCULAR; INTRAVENOUS PRN
Status: DISCONTINUED | OUTPATIENT
Start: 2021-05-05 | End: 2021-05-06

## 2021-05-05 RX ORDER — FIBRINOGEN (HUMAN) 700-1300MG
2100 KIT INTRAVENOUS ONCE
Status: COMPLETED | OUTPATIENT
Start: 2021-05-05 | End: 2021-05-06

## 2021-05-05 RX ADMIN — FENTANYL CITRATE 100 MCG: 50 INJECTION, SOLUTION INTRAMUSCULAR; INTRAVENOUS at 22:33

## 2021-05-05 RX ADMIN — FENTANYL CITRATE 200 MCG: 50 INJECTION, SOLUTION INTRAMUSCULAR; INTRAVENOUS at 23:02

## 2021-05-05 RX ADMIN — POTASSIUM CHLORIDE 40 MEQ: 750 TABLET, EXTENDED RELEASE ORAL at 07:37

## 2021-05-05 RX ADMIN — MIDAZOLAM 1 MG: 1 INJECTION INTRAMUSCULAR; INTRAVENOUS at 22:32

## 2021-05-05 RX ADMIN — MIDAZOLAM 2 MG: 1 INJECTION INTRAMUSCULAR; INTRAVENOUS at 22:30

## 2021-05-05 RX ADMIN — FLUTICASONE FUROATE AND VILANTEROL TRIFENATATE 1 PUFF: 100; 25 POWDER RESPIRATORY (INHALATION) at 07:38

## 2021-05-05 RX ADMIN — SODIUM CHLORIDE, SODIUM GLUCONATE, SODIUM ACETATE, POTASSIUM CHLORIDE AND MAGNESIUM CHLORIDE: 526; 502; 368; 37; 30 INJECTION, SOLUTION INTRAVENOUS at 23:59

## 2021-05-05 RX ADMIN — METHYLPREDNISOLONE SODIUM SUCCINATE 1000 MG: 1 INJECTION, POWDER, LYOPHILIZED, FOR SOLUTION INTRAMUSCULAR; INTRAVENOUS at 00:23

## 2021-05-05 RX ADMIN — SODIUM CHLORIDE 20 MG: 9 INJECTION, SOLUTION INTRAVENOUS at 23:20

## 2021-05-05 RX ADMIN — HEPARIN SODIUM 1200 UNITS/HR: 10000 INJECTION, SOLUTION INTRAVENOUS at 09:55

## 2021-05-05 RX ADMIN — CEFTRIAXONE SODIUM 2 G: 2 INJECTION, POWDER, FOR SOLUTION INTRAMUSCULAR; INTRAVENOUS at 23:15

## 2021-05-05 RX ADMIN — NOREPINEPHRINE BITARTRATE 0.03 MCG/KG/MIN: 1 INJECTION, SOLUTION, CONCENTRATE INTRAVENOUS at 22:50

## 2021-05-05 RX ADMIN — ANAKINRA 100 MG: 100 INJECTION, SOLUTION SUBCUTANEOUS at 07:39

## 2021-05-05 RX ADMIN — FENTANYL CITRATE 500 MCG: 50 INJECTION, SOLUTION INTRAMUSCULAR; INTRAVENOUS at 22:31

## 2021-05-05 RX ADMIN — BUMETANIDE 4 MG: 2 TABLET ORAL at 07:37

## 2021-05-05 RX ADMIN — GABAPENTIN 300 MG: 300 CAPSULE ORAL at 07:37

## 2021-05-05 RX ADMIN — FENTANYL CITRATE 200 MCG: 50 INJECTION, SOLUTION INTRAMUSCULAR; INTRAVENOUS at 23:26

## 2021-05-05 RX ADMIN — SODIUM CHLORIDE, SODIUM GLUCONATE, SODIUM ACETATE, POTASSIUM CHLORIDE AND MAGNESIUM CHLORIDE: 526; 502; 368; 37; 30 INJECTION, SOLUTION INTRAVENOUS at 22:50

## 2021-05-05 RX ADMIN — POTASSIUM CHLORIDE 40 MEQ: 750 TABLET, EXTENDED RELEASE ORAL at 19:39

## 2021-05-05 RX ADMIN — PROPOFOL 50 MG: 10 INJECTION, EMULSION INTRAVENOUS at 22:33

## 2021-05-05 RX ADMIN — GABAPENTIN 600 MG: 300 CAPSULE ORAL at 14:01

## 2021-05-05 RX ADMIN — BUPROPION HYDROCHLORIDE 100 MG: 100 TABLET, EXTENDED RELEASE ORAL at 07:37

## 2021-05-05 RX ADMIN — Medication 50 MG: at 16:19

## 2021-05-05 RX ADMIN — ROCURONIUM BROMIDE 100 MG: 10 INJECTION INTRAVENOUS at 22:34

## 2021-05-05 RX ADMIN — BUPROPION HYDROCHLORIDE 100 MG: 100 TABLET, EXTENDED RELEASE ORAL at 19:39

## 2021-05-05 RX ADMIN — ISOSORBIDE DINITRATE 10 MG: 10 TABLET ORAL at 14:01

## 2021-05-05 RX ADMIN — ISOSORBIDE DINITRATE 10 MG: 10 TABLET ORAL at 07:37

## 2021-05-05 RX ADMIN — SODIUM CHLORIDE, SODIUM GLUCONATE, SODIUM ACETATE, POTASSIUM CHLORIDE AND MAGNESIUM CHLORIDE: 526; 502; 368; 37; 30 INJECTION, SOLUTION INTRAVENOUS at 22:30

## 2021-05-05 RX ADMIN — HYDRALAZINE HYDROCHLORIDE 75 MG: 50 TABLET ORAL at 19:39

## 2021-05-05 RX ADMIN — CETIRIZINE HYDROCHLORIDE 10 MG: 10 TABLET, FILM COATED ORAL at 07:37

## 2021-05-05 RX ADMIN — BUMETANIDE 4 MG: 2 TABLET ORAL at 16:19

## 2021-05-05 RX ADMIN — AMIODARONE HYDROCHLORIDE 200 MG: 200 TABLET ORAL at 07:37

## 2021-05-05 RX ADMIN — ISOSORBIDE DINITRATE 10 MG: 10 TABLET ORAL at 19:39

## 2021-05-05 RX ADMIN — MIDAZOLAM 2 MG: 1 INJECTION INTRAMUSCULAR; INTRAVENOUS at 22:26

## 2021-05-05 RX ADMIN — MYCOPHENOLATE MOFETIL 1500 MG: 250 CAPSULE ORAL at 10:47

## 2021-05-05 RX ADMIN — DEXMEDETOMIDINE HYDROCHLORIDE 0.2 MCG/KG/HR: 100 INJECTION, SOLUTION INTRAVENOUS at 23:10

## 2021-05-05 RX ADMIN — SODIUM CHLORIDE 7.5 G: 900 INJECTION, SOLUTION INTRAVENOUS at 23:10

## 2021-05-05 RX ADMIN — ALLOPURINOL 300 MG: 300 TABLET ORAL at 07:37

## 2021-05-05 RX ADMIN — ASPIRIN 81 MG CHEWABLE TABLET 81 MG: 81 TABLET CHEWABLE at 07:37

## 2021-05-05 RX ADMIN — ACETAMINOPHEN 650 MG: 325 TABLET, FILM COATED ORAL at 16:19

## 2021-05-05 RX ADMIN — VANCOMYCIN HYDROCHLORIDE 1.5 G: 10 INJECTION, POWDER, LYOPHILIZED, FOR SOLUTION INTRAVENOUS at 23:10

## 2021-05-05 RX ADMIN — UMECLIDINIUM 1 PUFF: 62.5 AEROSOL, POWDER ORAL at 07:38

## 2021-05-05 RX ADMIN — HYDRALAZINE HYDROCHLORIDE 75 MG: 50 TABLET ORAL at 14:01

## 2021-05-05 RX ADMIN — HYDRALAZINE HYDROCHLORIDE 75 MG: 50 TABLET ORAL at 07:37

## 2021-05-05 ASSESSMENT — ACTIVITIES OF DAILY LIVING (ADL)
ADLS_ACUITY_SCORE: 12

## 2021-05-05 ASSESSMENT — COPD QUESTIONNAIRES: COPD: 1

## 2021-05-05 ASSESSMENT — MIFFLIN-ST. JEOR: SCORE: 1792.36

## 2021-05-05 ASSESSMENT — ENCOUNTER SYMPTOMS: DYSRHYTHMIAS: 1

## 2021-05-05 NOTE — PROGRESS NOTES
The patient's HeartMate LVAD was interrogated 5/5/2021  * Speed 9600 rpm   * Pulsatility index 4.2   * Power 7.1 Manuel   * Flow 7.0 L/minute   Fluid status: mild hypervolemic   Alarms were reviewed, and notable for occasional PI events with rare associated speed drops, low voltage alerts last night at 8pm.   The driveline exit site was inspected, c/d/i.   All external components were inspected and showed no evidence of damage or malfunction, none replaced.   No changes to VAD settings made

## 2021-05-05 NOTE — PROGRESS NOTES
SPIRITUAL HEALTH SERVICES  SPIRITUAL ASSESSMENT Progress Note  [facility] ([place]) 6C     In-depth conversation with pt about his family (pt's laptop has photos of extended family, particularly grandchildren, that pop up randomly - pt enjoyed talking about family as their photos showed) and about pt's enjoyment of all types of music. I helped pt find some online videos of music groups he enjoys. Pt coping well, remains hopeful, optimistic.    PLAN: continue to follow, visiting at least weekly while pt on unit.    Parviz Smith M.Div. ([place]), [facility]  Staff   Pager [phone]      * [facility] remains available 24/7 for emergent requests/referrals, either by having the switchboard page the on-call  or by entering an ASAP/STAT consult in Epic (this will also page the on-call ). Routine Epic consults receive an initial response within 24 hours.*

## 2021-05-05 NOTE — PLAN OF CARE
D: Admitted s/p HF decompensation, status 2E transplant   PMH of NICM EF 20% c/b VT s/p CRT-D s/p HM2 LVAD 6/19/2017, VT, A-Fib, CKD Stage III, COPD, depression    I: Monitored vitals and assessed pt status.     PRN: Tylenol, Tramadol      A: A0x4. Afebrile. VSS. HRs 80s-100s. Sinus rhythm w/1st degree AV block. Pt has had intermittent episodes of slow VT rates 90s-100s. MD notified. No additional interventions at this time. Pt denies any shortness of breath at rest, chest pain/palpitations, nausea, dizziness, or chills.  HM2 LVAD. Numbers WNL. Dressing CDI, declined to be changed at this time. Heparin gtt started this morning, turned off @ 1710 per MD(Dr. Quigley). BGs q4hrs. NPO. CHG wipes completed.Pt up independently.         P: Pt to transfer to PACU @ 2200 and OR @ 2330. Continue to monitor pt status and notify Cards 2 treatment team with any changes or concerns.

## 2021-05-05 NOTE — PROGRESS NOTES
"LVAD/Transplant Social Work Services Progress Note      Date of Initial Social Work Evaluation: 2/10/2021  Collaborated with: Pt and pt's wife, Cate, at bedside     Data: Pt is an LVAD pt listed status 2E for heart transplant on 6C. Pt anticipated to go to OR today for heart transplant.   Met w/ pt and wife to provide support and address any questions or concerns and manage expectations post-transplant. Neither have questions or concerns. Pt reports he is feeling \"at peace\" and really attributes this to the multiple care providers that he has had and gotten to know, especially while waiting inpatient for his heart. Pt's wife is nervous as her previous   one day after heart transplant here at the [facility], many years ago.     Intervention: Supportive Visit   Assessment: Pt has coped very well with his prolonged hospitalization waiting for a heart. He has appreciated the relationships he has formed with staff and feels they are what carried him through his waiting time. Pt's wife will require extra emotional support in coming days and writer will check-in with her.   Education provided by SW: Ongoing Social Work support   Plan:    Discharge Plans in Progress: Will assess post-transplant     Barriers to d/c plan: Anticipated heart transplant today    Follow up Plan: SW to continue to follow for support.     "

## 2021-05-05 NOTE — PLAN OF CARE
D: Admitted 2/08 for worsening functional status and renal function concerning for worsening heart failure. Now listed status 2E on heart transplant list.   Hx: COPD, CKD3, DM2, NICM (EF 20%), VT/VF s/p CRT-D s/p LVAD HM2 2017 initially as destination therapy d/t obesity, but pt now eligible for transplant due to weight loss.     I: Monitored vitals and assessed pt status.   Changed:   - pt had multiple runs VT overnight, longest lasting 4 minutes at a rate of 90 bpm. Pt sleeping, asymptomatic. VSS. Electrolytes WNL. Cards notified.   - one time dose Phytonadione given for Warfarin reversal   - solu-medrol and mycophenolate given   - one alfonso scrub completed, will need one more today  - COVID swab collected and resulted negative  - 2 view XR completed   - UA collected, results pending   - NPO since 2300  PRN: tramadol, tylenol, melatonin     A: A0x4. VSS on RA/home CPAP at night. Tele shows SR with 1st degree AVB, rate 60-80. LVAD without alarms and numbers within parameters, dressing CDI. Pt reports pain in bilateral wrists and shoulders, acceptable level with current pain regimen. Q4h BG checks. Voiding adequately. Last BM 5/3 per pt. Up independently. Appeared to rest comfortably overnight.      P: Plan for heart transplant this afternoon/evening. Continue to monitor Pt status and report changes to Cards 2.     [id number]  Rajani Holman RN on 5/5/2021 at 6:40 AM

## 2021-05-05 NOTE — TELEPHONE ENCOUNTER
HEART donor was identified by Lester Maldonado and reviewed with Dr. Seth. The organ was accepted and  spoke with pt in 6C about transplant.    Donor UNOS ID [id number] and Match ID [id number] confirmed with Dr. Seth.    Donor and recipient blood type reviewed and found to be IDENTICAL.    Recipient with HLA antibodies: YES  Crossmatch required   Prospective:N/A   Virtual:COMPATIBLE  Crossmatch reviewed with , immunology staff on call and deemed negative based on organ specific protocol.     Donor specific antibodies absent, notified Dr. Seth.  Donor does NOT meet criteria to be classified as PHS INCREASED RISK.    Pt was contacted in 6C.  Verified pt has not had any blood transfusions since their last PRA sample on 04/28/2021.   Pt Instructed to remain NPO for pre-op prep.  Instructed to bring medications, oxygen, or equipement.  Pt instructed to come to the [facility]  ER. N/A  Pt on Coumadin: YES   Intervention:  staff to determine.  Pt has had a positive COVID test: No               Date of last positive COVID test: 04/23/2021  HEART RECIPIENT: Renal function reviewed, pt IS pre-selected for CNI delaying protocol, Dr. Seth notified. @LASTLAB(Cr:1.53)05/01/2021]      ABO/CMV/EBV status note:   UNOS donor ID [id number]  Donor blood type is O: Verified by donor records   Recipient blood type is O: verified by blood bank [facility].   Donor CMV status is positive. Verified by donor records.   Recipient CMV status is positive. Verified in [facility] lab results.   Donor EBV status is positive. Verified by donor records.   Recipient EBV status is positive. Verified in [facility] lab results.   Recipient HSV status is positive. verified in [facility] lab results.   Recipient Toxoplasma status is negative. verified in [facility] lab results.   Donor Toxoplasma status is negative. Verified by donor records.

## 2021-05-05 NOTE — PROGRESS NOTES
[facility]   Cardiology II Service / Advanced Heart Failure  Daily Progress Note        Patient: Jim Willingham  MRN: [id number]  Admission Date: 2/8/2021  Hospital Day # 86       Assessment and Plan:   Mr. Jim Willingham is a 65 year old male with history of NICM EF 20% c/b VT s/p CRT-D s/p HMII LVAD 6/19/2017 originally intended as destination therapy 2/2 obesity however with recent weight loss now candidate for transplantation. He is admitted for worsening functional status and renal function concerning for worsening heart failure. He is now listed status 2E for transplant.      Today's plan:   - Patient with donor heart offer today, No OR time  - Pre-transplant orders per CVTS, simulect ordered  - Pt received vitamin K last night, INR 1.5 today. Will start very low intensity heparin gtt now, more vitamin K this afternoon  - Continue bumex  - Holding acarbose while NPO    Chronic systolic heart failure secondary to NICM s/p HM II LVAD. Echo 1/8/21 at 9400 rpm, EF<30%m LVIDd 6.8cm, at least mildly reduced RV function, AoV closed without AI, mild-mod eccentric MR, dilated IVC without collapse. RHC 3/23 RA 10 mPA 25 mPCWP 14 Kee CO/CI 6.5/3.1.  Stage D, NYHA Class III  ACEi/ARB contraindicated due to renal dysfunction. Isordil 10 mg po TID. Hydralazine 75 mg po TID.   BB Contraindicated due to low cardiac output.   Aldosterone antagonist contraindicated due to renal dysfunction  SCD prophylaxis CRT-D   Fluid status: Near Euvolemia. Continue Bumex 4 mg po BID.  MAP: 60's - 91  LDH: 319 4/23  Anticoagulation: Coumadin per pharmacy. INR- 2 intermittent checks, goal 2.5-3 (goal increased in setting of power spikes and elevated flows this hospitalization.)  Antiplatelet: ASA 81 mg po daily  - remains on the cardiac transplant list status 2E. Persistent escalation of diuretics in setting of progressive heart failure with refractory hypervolemia.      WALTER on CKD Stage III.   - Cr- 1.59  - diuresis as noted  above     History of Afib. History of VT/VF. Asymptomatic bursts of questionable AF vs NSVT without hemodynamic compromise. RHC 3/23 RA 10 mPA 25 mPCWP 14 Kee CO/CI 6.5/3.1.  - Continue Amiodarone 200 mg po daily.   - Coumadin as above.      DM type II, controlled. Symptomatic Hypoglycemia, resolved. Low Cortisol: Hgb A1C-6.2. Lantus and Novololg sliding scale insulin discontinued. Endo consult appreciated. C-peptide 13.9 an Proinsulin 18.4. Serum cortisol 7.7 3/6/21, cosyntropin stim test WNL per Endo.   - Per endo --> Fingerstick glucose might not be accurate for hypoglycemia, if patient has glucose <55 (without insulin), plasma glucose should be sent for confirmation of hypoglycemia prior to correction. While the patient is off insulin therapy, POC glucose readings above 60 are acceptable for the patient.   Stable POC blood glucoses without overnight hypoglycemia.    - Continue Acarbose 25 mg po in AM add evening dose given overnight hypoglycemia.      Carpel tunnel, bilateral s/p bilateral release. Evidence of thenar atrophy. Relief with steroid injection 11/20. Carpal tunnel release 2/18/21. Appreciate Ortho/Plastics consult, signed off. Continue Tylenol 650 mg po QID and Gabapentin 300 mg in AM, 600 mg in the afternoon, and 600 mg in the evening.   - Lidocaine gel prn.   - Appreciate Plastic surgery consult, signed off.   - Tramadol prn.  - Elavil 37.5 mg po at HS.  - Hand braces and therapy consult appreciated.   - Flexeril 5 mg TID PRN    Neutropenia.   - WBC stable at 3.5, ANC 3200  - continue anakinra every other day as discussed with rheumatology     Chronic/resolved conditions  COPD/Asthma: pta Breo Ellipta, albuterol prn.   Depression: pta Bupropion  Right sided weakness, resolved. CT head negative for acute findings. Appreciate Neuro eval.   Staph Hominis Bacteremia. No need for surveillance blood cx per transplant ID.    Rhinovirus, resolved. Bacterial bronchitis. Completed 5 day course of Cefdinir.  "COVID negative 2/8. Resp PCR +rhinovirus. CXR 2/12 with no overt PNA. Stopped cefdinir 2/15. Cleared for transplant per ID. Repeat RVP negative 3/5. COVID repeated due to sinus congestion, negative. Congestion improved with transition from Claritin to Zyrtec.       FEN: 3 gram sodium diet   PROPHY:  Coumadin  LINES:  PIV   DISPO:  TBD pending cardiac transplant.   CODE STATUS: Full Code   ================================================================  Interval History/ROS: Feeling okay today. Anxious and excited regarding heart offer. We had an extensive conversation about next steps and that we do not have an OR time currently.  Denies SOB, significant RESENDIZ, orthopnea, PND, CP, palpitations, fever/chills, cough, sore throat, dizziness, N/V/D, constipation, HA, vision changes, or signs of bleeding.        Last 24 hr care team notes reviewed.   ROS:  4 point ROS including Respiratory, CV, GI and , other than that noted in the HPI, is negative.     Medications: Reviewed in EPIC.     Physical Exam:   /88 (BP Location: Left arm)   Pulse 87   Temp 98.1  F (36.7  C) (Oral)   Resp 18   Ht 1.727 m (5' 8\")   Wt 102.3 kg (225 lb 8 oz)   SpO2 96%   BMI 34.29 kg/m    GENERAL: Appears alert and interacting appropriatly.  HEENT: Eye symmetrical and free of discharge bilaterally. Mucous membranes moist and without lesions.  NECK: Supple and without lymphadenopathy. JVD at clavicle when sitting upright at 90 degrees.   CV: RRR, S1S2 present with LVAD hum.   RESPIRATORY: Respirations regular, even, and unlabored. Lungs CTA throughout.   GI: Soft and mildly distended with normoactive bowel sounds present in all quadrants. No tenderness, rebound, guarding. No organomegaly.   EXTREMITIES: Trace bilateral LE peripheral edema. 2+ bilateral pedal pulses.   NEUROLOGIC: Alert and nteracting appropriatly.. No focal deficits.   MUSCULOSKELETAL: No joint swelling or tenderness.   SKIN: No jaundice. No rashes or lesions. LVAD " drive line covered.     Data:  CMP  Recent Labs   Lab 05/05/21 0628 05/04/21 2313 05/04/21 0623 05/03/21 0629 05/02/21  0710   * 135  --  135 134   POTASSIUM 4.6 4.0 3.9 3.9 3.9   CHLORIDE 100 101  --  102 100   CO2 25 26  --  26 26   ANIONGAP 7 7  --  7 8   * 115*  --  89 115*   BUN 49* 50*  --  56* 54*   CR 1.59* 1.60*  --  1.81* 1.71*   GFRESTIMATED 45* 45*  --  39* 41*   GFRESTBLACK 52* 52*  --  45* 48*   QAMAR 9.4 9.1  --  8.8 8.6   MAG 2.4* 2.3 2.4* 2.5* 2.6*   PHOS  --  3.8  --   --   --    PROTTOTAL  --  7.2  --  6.5*  --    ALBUMIN  --  4.0  --  3.6  --    BILITOTAL  --  0.6  --  0.7  --    ALKPHOS  --  111  --  100  --    AST  --  47*  --  42  --    ALT  --  40  --  32  --      CBC  Recent Labs   Lab 05/05/21 0628 05/04/21 2313 05/03/21 0629 04/30/21  0514   WBC 3.5* 4.6 4.2 4.2   RBC 4.37* 4.35* 4.03* 3.81*   HGB 13.2* 12.8* 12.3* 11.2*   HCT 40.8 40.4 38.7* 36.2*   MCV 93 93 96 95   MCH 30.2 29.4 30.5 29.4   MCHC 32.4 31.7 31.8 30.9*   RDW 14.6 14.7 14.6 14.8    216 186 173     INR  Recent Labs   Lab 05/05/21 0628 05/04/21 2313 05/03/21 0629 05/02/21  0710   INR 1.57* 2.00* 2.31* 2.59*           Didi Butler PA-C  [facility] CArdiology

## 2021-05-05 NOTE — PROGRESS NOTES
"CLINICAL NUTRITION SERVICES - REASSESSMENT NOTE     Nutrition Prescription    RECOMMENDATIONS FOR MDs/PROVIDERS TO ORDER:  1. See recommendations below for TF regimen if needed post-tx.     Malnutrition Status:    Patient does not meet two of the established criteria necessary for diagnosing malnutrition    Recommendations already ordered by Registered Dietitian (RD):  None additionally     Future/Additional Recommendations:  1. Diet order per team.   2. If TFs are needed (if/when post-op Tx), would rec place feeding tube (FT) via radiology due to RNY hx and initiate TFs, Osmolite 1.5 (concentrated, maintenance TF formula) and order Prosource TF modular. Initiate TFs at 15 mL/hr, advancing rate by 10 mL Q 8 hrs (or per provider discretion, pending hemodynamic stability) to goal rate of 65 mL/hr. Osmolite 1.5 at goal of 65 mL/hr and 1 pkt Prosource TF modular daily: 1560 mL TF/day, 2340+ kcals, 97+ g PRO, 1188 ml free H20, 317 g CHO, and 0 g fiber daily.                 If begin tube feeds:               - Flush FT with 30 mL water Q 4 hrs for patency. Rec provider adjust free water flushes as needed, pending fluid status.              - Ensure K+/Mg++/Phos labs are ordered daily until TFs advance to goal infusion to evaluate for sx of refeeding with nutrition received. If lytes trend low, aggressively replace lytes.                  - If not already ordered, order a multivitamin/mineral (certavite 15 mL/day via FT) to help ensure micronutrient needs being met with suspected hypermetabolic demands and potential interruptions to TF infusions.              - Monitor TF and possible need to adjust nutrition support regimen if necessary, pending medical course and nutrition status.                  - Monitor need to check a metabolic cart study.                - Send a nutrition consult for \"Registered Dietitian to Order TF per Medical Nutrition Therapy Guidelines\" if desire RD to order TFs.   3. Monitor glycemic " control. DM 2. Hgb A1c of 6.2 on 1/7/21. Monitor glycemic control with hypoglycemia this admit.  4. Consider checking thiamine, folic acid, and vitamin B12 labs (vit B12 lab was WNL, 450 on 1/22/21).   5. Monitor iron status.  6. Order the following (Sylvester-en-y Gastric Bypass) if pt agreeable:    Multivitamin/minerals: adult dose 2 times daily    Iron: 45-60 mg elemental daily (18-36 mg daily if low risk) - may partly or fully be covered in multivitamin     Calcium Citrate containing vitamin D: 500 mg 3 times daily or 600 mg 2 times daily    Vitamin B12: sublingual form of at least 500 mcg daily or injection of 1000 mcg monthly     B-50 Complex daily     EVALUATION OF THE PROGRESS TOWARD GOALS   Diet: NPO for possible tx, Chocolate or butter pecan Glucerna, 2 supplements @ 2 pm, PB and sugar free Strawberry jelly sandwich at 10 am and HS  Intake: Per chart review, pt consuming 100% of meals 2-3 times/day. Per conversation with patient, he states he has had no acute changes in appetite over the last week, however, feels that lack of interest in the menu options prevent him from ordering meals every once in awhile. Pt states he has been consuming Glucerna shakes everyday and his wife as been able to bring in some food from home. Potential upcoming Tx is a factor that may affect oral intake.       NEW FINDINGS   Weight: 118.4 kg (2/13/20), 106.2 kg (8/7/20), 103.9 kg (11/13/20), 94.8 kg (1/12/21), 99.5 kg (2/8/21, admit), 100.2 kg (2/15/21), 98.2 kg (2/22), 100 kg (3/2), 99.7 kg (3/11), 99.9 kg (4/9), 101.8 kg (4/26), 102.3 kg (5/5) - Previously noted that pt had intentionally been trying to lose weight for Tx, however, no overall weight loss since admission.   Labs: BG x 24 hr 143-197   Meds: Bumex, potassium chloride  GI: Last BM noted on 5/2      UPDATED ASSESSED NUTRITION NEEDS (for inpatient hospital stay)   Estimated Energy Needs: 7663-1834 kcals/day (30-35 kcals/kg)   Justification: Estimated needs with wt  status. If/when post-op Tx, rec the low end of this range.   Estimated Protein Needs:  grams protein/day (1.3 - 2 grams of pro/kg)   Justification: Increased needs post-tx pending renal function   Estimated Fluid Needs: ~9117-1265 mL/day (~20 - 25 mL/kg)   Justification: Estimated needs, or per provider pending fluid status     MALNUTRITION  % Intake: Decreased intake does not meet criteria  % Weight Loss: Weight loss does not meet criteria  Subcutaneous Fat Loss: None observed  Muscle Loss: None observed  Fluid Accumulation/Edema: Mild  Malnutrition Diagnosis: Patient does not meet two of the established criteria necessary for diagnosing malnutrition, but is at risk for malnutrition.     Previous Goals   Patient to consume % of nutritionally adequate meal trays TID, or the equivalent with supplements/snacks.  Evaluation: Met, ongoing     Previous Nutrition Diagnosis  Predicted inadequate nutrient intake (protein-energy) related to food choices, LOS increasing risk for menu fatigue, potential upcoming surgery, and bilateral hand pain with unknown NPO status duration.   Evaluation: No change, updated below     CURRENT NUTRITION DIAGNOSIS  Predicted inadequate nutrient intake (protein-energy) related to food choices, LOS increasing risk for menu fatigue, potential upcoming surgery, unknown NPO status duration.    INTERVENTIONS  Implementation  None additionally     Goals  Patient to consume % of nutritionally adequate meal trays TID, or the equivalent with supplements/snacks.    Monitoring/Evaluation  Progress toward goals will be monitored and evaluated per protocol.    Mp Smith   Dietetic Intern    I have read and agree with the above nutrition reassessment, eval, and recs.   Sana Costello, MS, RD, LD, [facility]   6C Pgr: [phone]

## 2021-05-05 NOTE — PROGRESS NOTES
Patient seen and examined. Investigations reviewed.  Appropriate donor heart identified for patient. Will proceed with heart transplantation. Risks and benefits of surgery discussed with patient and wife including risks of death, stroke, bleeding, infection and graft failure. They understand and are willing to proceed with surgery. Plan discussed with Heart Failure team who are in agreement.

## 2021-05-05 NOTE — PROGRESS NOTES
Inpatient Visit Note (Telephone) 05/05/21    Met with patient for a supportive visit today as donor heart has been identified and transplant is pending.  Patient remains positive and hopeful, is grateful and appreciative of all of the care he has received during this process.  Patient understands he will be transitioning to a post transplant coordinator when he is discharged from the hospital.        Patient verbalized understanding of the plan and agrees to call Transplant Coordinator with any further questions or concerns at [phone].

## 2021-05-05 NOTE — PLAN OF CARE
Was just contacted by the Transplant coordinator John about times regarding transplant.      Pre op 2200; OR 2330

## 2021-05-05 NOTE — PROGRESS NOTES
CVTS:     Discussed offer for donor heart today.   Patient questions answered.   INR 1.57.  Will start start Hep gtt bridging and give Vit K PO after Hep gtt on.     Nash Amado PA-C  Cardiothoracic Surgery  Pager [phone]    8:32 AM   May 5, 2021

## 2021-05-06 ENCOUNTER — APPOINTMENT (OUTPATIENT)
Dept: GENERAL RADIOLOGY | Facility: CLINIC | Age: 66
DRG: 001 | End: 2021-05-06
Attending: INTERNAL MEDICINE
Payer: COMMERCIAL

## 2021-05-06 ENCOUNTER — APPOINTMENT (OUTPATIENT)
Dept: GENERAL RADIOLOGY | Facility: CLINIC | Age: 66
DRG: 001 | End: 2021-05-06
Attending: STUDENT IN AN ORGANIZED HEALTH CARE EDUCATION/TRAINING PROGRAM
Payer: COMMERCIAL

## 2021-05-06 ENCOUNTER — APPOINTMENT (OUTPATIENT)
Dept: GENERAL RADIOLOGY | Facility: CLINIC | Age: 66
DRG: 001 | End: 2021-05-06
Attending: SURGERY
Payer: COMMERCIAL

## 2021-05-06 LAB
ALBUMIN SERPL-MCNC: 2.7 G/DL (ref 3.4–5)
ALP SERPL-CCNC: 60 U/L (ref 40–150)
ALT SERPL W P-5'-P-CCNC: 30 U/L (ref 0–70)
ANGLE RATE OF CLOT GROWTH: 67.6 DEG (ref 59–74)
ANGLE RATE OF CLOT GROWTH: 70.7 DEG (ref 59–74)
ANGLE RATE OF CLOT GROWTH: 71.9 DEG (ref 59–74)
ANGLE RATE OF CLOT GROWTH: 74.1 DEG (ref 59–74)
ANION GAP SERPL CALCULATED.3IONS-SCNC: 13 MMOL/L (ref 3–14)
ANION GAP SERPL CALCULATED.3IONS-SCNC: 9 MMOL/L (ref 3–14)
APTT PPP: 26 SEC (ref 22–37)
APTT PPP: 35 SEC (ref 22–37)
AST SERPL W P-5'-P-CCNC: ABNORMAL U/L (ref 0–45)
BASE DEFICIT BLDA-SCNC: 0.5 MMOL/L
BASE DEFICIT BLDA-SCNC: 0.7 MMOL/L
BASE DEFICIT BLDA-SCNC: 3.1 MMOL/L
BASE DEFICIT BLDA-SCNC: 3.3 MMOL/L
BASE DEFICIT BLDA-SCNC: 5.6 MMOL/L
BASE DEFICIT BLDV-SCNC: 4.6 MMOL/L
BASE DEFICIT BLDV-SCNC: 4.9 MMOL/L
BASE DEFICIT BLDV-SCNC: 6.8 MMOL/L
BILIRUB SERPL-MCNC: 0.8 MG/DL (ref 0.2–1.3)
BLD PROD TYP BPU: NORMAL
BLD UNIT ID BPU: 0
BLOOD PRODUCT CODE: NORMAL
BPU ID: NORMAL
BUN SERPL-MCNC: 50 MG/DL (ref 7–30)
BUN SERPL-MCNC: 50 MG/DL (ref 7–30)
BUN SERPL-MCNC: 58 MG/DL (ref 7–30)
BUN SERPL-MCNC: 62 MG/DL (ref 7–30)
CA-I BLD-MCNC: 5.7 MG/DL (ref 4.4–5.2)
CALCIUM SERPL-MCNC: 8.3 MG/DL (ref 8.5–10.1)
CALCIUM SERPL-MCNC: 8.4 MG/DL (ref 8.5–10.1)
CALCIUM SERPL-MCNC: 8.5 MG/DL (ref 8.5–10.1)
CALCIUM SERPL-MCNC: 9.3 MG/DL (ref 8.5–10.1)
CHLORIDE SERPL-SCNC: 103 MMOL/L (ref 94–109)
CHLORIDE SERPL-SCNC: 104 MMOL/L (ref 94–109)
CHLORIDE SERPL-SCNC: 105 MMOL/L (ref 94–109)
CHLORIDE SERPL-SCNC: 106 MMOL/L (ref 94–109)
CI HYPERCOAGULATION INDEX: 1.1 RATIO (ref 0–3)
CI HYPERCOAGULATION INDEX: 1.4 RATIO (ref 0–3)
CI HYPERCOAGULATION INDEX: 2.4 RATIO (ref 0–3)
CI HYPERCOAGULATION INDEX: 3.2 RATIO (ref 0–3)
CLOT LYSIS 30M P MA LENFR BLD TEG: 0 % (ref 0–8)
CLOT LYSIS 30M P MA LENFR BLD TEG: 0 % (ref 0–8)
CLOT LYSIS 30M P MA LENFR BLD TEG: 2 % (ref 0–8)
CLOT LYSIS 30M P MA LENFR BLD TEG: 5 % (ref 0–8)
CLOT STRENGTH BLD TEG: 13.8 KD/SC (ref 5.3–13.2)
CLOT STRENGTH BLD TEG: 14.4 KD/SC (ref 5.3–13.2)
CLOT STRENGTH BLD TEG: 8.4 KD/SC (ref 5.3–13.2)
CLOT STRENGTH BLD TEG: 9.9 KD/SC (ref 5.3–13.2)
CO2 SERPL-SCNC: 21 MMOL/L (ref 20–32)
CO2 SERPL-SCNC: 24 MMOL/L (ref 20–32)
CO2 SERPL-SCNC: 24 MMOL/L (ref 20–32)
CO2 SERPL-SCNC: 25 MMOL/L (ref 20–32)
CREAT SERPL-MCNC: 1.69 MG/DL (ref 0.66–1.25)
CREAT SERPL-MCNC: 1.83 MG/DL (ref 0.66–1.25)
CREAT SERPL-MCNC: 2.22 MG/DL (ref 0.66–1.25)
CREAT SERPL-MCNC: 2.41 MG/DL (ref 0.66–1.25)
DONOR IDENTIFICATION: NORMAL
DSA COMMENTS: NORMAL
DSA PRESENT: NO
DSA TEST METHOD: NORMAL
ERYTHROCYTE [DISTWIDTH] IN BLOOD BY AUTOMATED COUNT: 14.8 % (ref 10–15)
ERYTHROCYTE [DISTWIDTH] IN BLOOD BY AUTOMATED COUNT: 15 % (ref 10–15)
ERYTHROCYTE [DISTWIDTH] IN BLOOD BY AUTOMATED COUNT: 15.3 % (ref 10–15)
ERYTHROCYTE [DISTWIDTH] IN BLOOD BY AUTOMATED COUNT: 15.3 % (ref 10–15)
ERYTHROCYTE [DISTWIDTH] IN BLOOD BY AUTOMATED COUNT: 15.7 % (ref 10–15)
FIBRINOGEN PPP-MCNC: 203 MG/DL (ref 200–420)
GFR SERPL CREATININE-BSD FRML MDRD: 27 ML/MIN/{1.73_M2}
GFR SERPL CREATININE-BSD FRML MDRD: 30 ML/MIN/{1.73_M2}
GFR SERPL CREATININE-BSD FRML MDRD: 38 ML/MIN/{1.73_M2}
GFR SERPL CREATININE-BSD FRML MDRD: 42 ML/MIN/{1.73_M2}
GLUCOSE BLD-MCNC: 233 MG/DL (ref 70–99)
GLUCOSE BLDC GLUCOMTR-MCNC: 113 MG/DL (ref 70–99)
GLUCOSE BLDC GLUCOMTR-MCNC: 129 MG/DL (ref 70–99)
GLUCOSE BLDC GLUCOMTR-MCNC: 131 MG/DL (ref 70–99)
GLUCOSE BLDC GLUCOMTR-MCNC: 134 MG/DL (ref 70–99)
GLUCOSE BLDC GLUCOMTR-MCNC: 145 MG/DL (ref 70–99)
GLUCOSE BLDC GLUCOMTR-MCNC: 147 MG/DL (ref 70–99)
GLUCOSE BLDC GLUCOMTR-MCNC: 149 MG/DL (ref 70–99)
GLUCOSE BLDC GLUCOMTR-MCNC: 159 MG/DL (ref 70–99)
GLUCOSE BLDC GLUCOMTR-MCNC: 164 MG/DL (ref 70–99)
GLUCOSE BLDC GLUCOMTR-MCNC: 164 MG/DL (ref 70–99)
GLUCOSE BLDC GLUCOMTR-MCNC: 165 MG/DL (ref 70–99)
GLUCOSE BLDC GLUCOMTR-MCNC: 178 MG/DL (ref 70–99)
GLUCOSE BLDC GLUCOMTR-MCNC: 202 MG/DL (ref 70–99)
GLUCOSE BLDC GLUCOMTR-MCNC: 208 MG/DL (ref 70–99)
GLUCOSE SERPL-MCNC: 152 MG/DL (ref 70–99)
GLUCOSE SERPL-MCNC: 162 MG/DL (ref 70–99)
GLUCOSE SERPL-MCNC: 165 MG/DL (ref 70–99)
GLUCOSE SERPL-MCNC: 201 MG/DL (ref 70–99)
HCO3 BLD-SCNC: 21 MMOL/L (ref 21–28)
HCO3 BLD-SCNC: 23 MMOL/L (ref 21–28)
HCO3 BLD-SCNC: 23 MMOL/L (ref 21–28)
HCO3 BLD-SCNC: 25 MMOL/L (ref 21–28)
HCO3 BLD-SCNC: 25 MMOL/L (ref 21–28)
HCO3 BLDV-SCNC: 19 MMOL/L (ref 21–28)
HCO3 BLDV-SCNC: 21 MMOL/L (ref 21–28)
HCO3 BLDV-SCNC: 23 MMOL/L (ref 21–28)
HCT VFR BLD AUTO: 24.9 % (ref 40–53)
HCT VFR BLD AUTO: 26.5 % (ref 40–53)
HCT VFR BLD AUTO: 28.3 % (ref 40–53)
HCT VFR BLD AUTO: 29 % (ref 40–53)
HCT VFR BLD AUTO: 31.8 % (ref 40–53)
HGB BLD-MCNC: 10.3 G/DL (ref 13.3–17.7)
HGB BLD-MCNC: 6.8 G/DL (ref 13.3–17.7)
HGB BLD-MCNC: 8 G/DL (ref 13.3–17.7)
HGB BLD-MCNC: 8.6 G/DL (ref 13.3–17.7)
HGB BLD-MCNC: 9.3 G/DL (ref 13.3–17.7)
HGB BLD-MCNC: 9.3 G/DL (ref 13.3–17.7)
INR PPP: 1.45 (ref 0.86–1.14)
INR PPP: 2.02 (ref 0.86–1.14)
INTERPRETATION ECG - MUSE: NORMAL
K TIME TO SPEC CLOT STRENGTH: 1.2 MIN (ref 1–3)
K TIME TO SPEC CLOT STRENGTH: 1.2 MIN (ref 1–3)
K TIME TO SPEC CLOT STRENGTH: 1.4 MIN (ref 1–3)
K TIME TO SPEC CLOT STRENGTH: 1.7 MIN (ref 1–3)
LACTATE BLD-SCNC: 1.3 MMOL/L (ref 0.7–2)
LACTATE BLD-SCNC: 2.3 MMOL/L (ref 0.7–2)
LACTATE BLD-SCNC: 3.1 MMOL/L (ref 0.7–2)
LACTATE BLD-SCNC: 3.2 MMOL/L (ref 0.7–2)
LACTATE BLD-SCNC: 4.6 MMOL/L (ref 0.7–2)
LACTATE BLD-SCNC: 4.7 MMOL/L (ref 0.7–2)
LY60 LYSIS AT 60 MINUTES: 1.9 % (ref 0–15)
LY60 LYSIS AT 60 MINUTES: 12.4 % (ref 0–15)
LY60 LYSIS AT 60 MINUTES: 2.2 % (ref 0–15)
LY60 LYSIS AT 60 MINUTES: 5.7 % (ref 0–15)
MA MAXIMUM CLOT STRENGTH: 62.7 MM (ref 55–74)
MA MAXIMUM CLOT STRENGTH: 66.5 MM (ref 55–74)
MA MAXIMUM CLOT STRENGTH: 73.4 MM (ref 55–74)
MA MAXIMUM CLOT STRENGTH: 74.2 MM (ref 55–74)
MAGNESIUM SERPL-MCNC: 3 MG/DL (ref 1.6–2.3)
MAGNESIUM SERPL-MCNC: 3.1 MG/DL (ref 1.6–2.3)
MCH RBC QN AUTO: 29.7 PG (ref 26.5–33)
MCH RBC QN AUTO: 30 PG (ref 26.5–33)
MCH RBC QN AUTO: 30.9 PG (ref 26.5–33)
MCHC RBC AUTO-ENTMCNC: 32.1 G/DL (ref 31.5–36.5)
MCHC RBC AUTO-ENTMCNC: 32.1 G/DL (ref 31.5–36.5)
MCHC RBC AUTO-ENTMCNC: 32.4 G/DL (ref 31.5–36.5)
MCHC RBC AUTO-ENTMCNC: 32.5 G/DL (ref 31.5–36.5)
MCHC RBC AUTO-ENTMCNC: 32.9 G/DL (ref 31.5–36.5)
MCV RBC AUTO: 92 FL (ref 78–100)
MCV RBC AUTO: 93 FL (ref 78–100)
MCV RBC AUTO: 94 FL (ref 78–100)
MRSA DNA SPEC QL NAA+PROBE: NEGATIVE
NUM BPU REQUESTED: 2
O2/TOTAL GAS SETTING VFR VENT: 100 %
O2/TOTAL GAS SETTING VFR VENT: 40 %
O2/TOTAL GAS SETTING VFR VENT: 40 %
O2/TOTAL GAS SETTING VFR VENT: 50 %
ORGAN: NORMAL
OXYHGB MFR BLD: 97 % (ref 92–100)
OXYHGB MFR BLD: 97 % (ref 92–100)
OXYHGB MFR BLD: 98 % (ref 92–100)
OXYHGB MFR BLD: 98 % (ref 92–100)
OXYHGB MFR BLDV: 57 %
OXYHGB MFR BLDV: 62 %
OXYHGB MFR BLDV: 72 %
PCO2 BLD: 44 MM HG (ref 35–45)
PCO2 BLD: 45 MM HG (ref 35–45)
PCO2 BLD: 48 MM HG (ref 35–45)
PCO2 BLDV: 37 MM HG (ref 40–50)
PCO2 BLDV: 40 MM HG (ref 40–50)
PCO2 BLDV: 53 MM HG (ref 40–50)
PH BLD: 7.29 PH (ref 7.35–7.45)
PH BLD: 7.29 PH (ref 7.35–7.45)
PH BLD: 7.32 PH (ref 7.35–7.45)
PH BLD: 7.36 PH (ref 7.35–7.45)
PH BLD: 7.37 PH (ref 7.35–7.45)
PH BLDV: 7.24 PH (ref 7.32–7.43)
PH BLDV: 7.32 PH (ref 7.32–7.43)
PH BLDV: 7.33 PH (ref 7.32–7.43)
PHOSPHATE SERPL-MCNC: 4 MG/DL (ref 2.5–4.5)
PHOSPHATE SERPL-MCNC: 5.7 MG/DL (ref 2.5–4.5)
PLATELET # BLD AUTO: 125 10E9/L (ref 150–450)
PLATELET # BLD AUTO: 168 10E9/L (ref 150–450)
PLATELET # BLD AUTO: 177 10E9/L (ref 150–450)
PLATELET # BLD AUTO: 184 10E9/L (ref 150–450)
PLATELET # BLD AUTO: 210 10E9/L (ref 150–450)
PO2 BLD: 148 MM HG (ref 80–105)
PO2 BLD: 190 MM HG (ref 80–105)
PO2 BLD: 194 MM HG (ref 80–105)
PO2 BLD: 262 MM HG (ref 80–105)
PO2 BLD: 449 MM HG (ref 80–105)
PO2 BLDV: 33 MM HG (ref 25–47)
PO2 BLDV: 36 MM HG (ref 25–47)
PO2 BLDV: 46 MM HG (ref 25–47)
POTASSIUM BLD-SCNC: 3.6 MMOL/L (ref 3.4–5.3)
POTASSIUM BLD-SCNC: 3.9 MMOL/L (ref 3.4–5.3)
POTASSIUM SERPL-SCNC: 3.8 MMOL/L (ref 3.4–5.3)
POTASSIUM SERPL-SCNC: 4.6 MMOL/L (ref 3.4–5.3)
POTASSIUM SERPL-SCNC: 5 MMOL/L (ref 3.4–5.3)
POTASSIUM SERPL-SCNC: 5.2 MMOL/L (ref 3.4–5.3)
PROT SERPL-MCNC: 4.6 G/DL (ref 6.8–8.8)
R TIME UNTIL CLOT FORMS: 3.6 MIN (ref 4–9)
R TIME UNTIL CLOT FORMS: 3.8 MIN (ref 4–9)
R TIME UNTIL CLOT FORMS: 6.7 MIN (ref 4–9)
R TIME UNTIL CLOT FORMS: 7.5 MIN (ref 4–9)
RBC # BLD AUTO: 2.67 10E12/L (ref 4.4–5.9)
RBC # BLD AUTO: 2.87 10E12/L (ref 4.4–5.9)
RBC # BLD AUTO: 3.01 10E12/L (ref 4.4–5.9)
RBC # BLD AUTO: 3.13 10E12/L (ref 4.4–5.9)
RBC # BLD AUTO: 3.43 10E12/L (ref 4.4–5.9)
SODIUM BLD-SCNC: 138 MMOL/L (ref 133–144)
SODIUM SERPL-SCNC: 136 MMOL/L (ref 133–144)
SODIUM SERPL-SCNC: 137 MMOL/L (ref 133–144)
SODIUM SERPL-SCNC: 139 MMOL/L (ref 133–144)
SODIUM SERPL-SCNC: 140 MMOL/L (ref 133–144)
SPECIMEN SOURCE: NORMAL
TRANSFUSION STATUS PATIENT QL: NORMAL
UFH PPP CHRO-ACNC: <0.1 IU/ML
WBC # BLD AUTO: 13.6 10E9/L (ref 4–11)
WBC # BLD AUTO: 15.9 10E9/L (ref 4–11)
WBC # BLD AUTO: 20 10E9/L (ref 4–11)
WBC # BLD AUTO: 22.6 10E9/L (ref 4–11)
WBC # BLD AUTO: 22.9 10E9/L (ref 4–11)

## 2021-05-06 PROCEDURE — 85027 COMPLETE CBC AUTOMATED: CPT | Performed by: STUDENT IN AN ORGANIZED HEALTH CARE EDUCATION/TRAINING PROGRAM

## 2021-05-06 PROCEDURE — 83605 ASSAY OF LACTIC ACID: CPT

## 2021-05-06 PROCEDURE — 250N000013 HC RX MED GY IP 250 OP 250 PS 637: Performed by: STUDENT IN AN ORGANIZED HEALTH CARE EDUCATION/TRAINING PROGRAM

## 2021-05-06 PROCEDURE — 88311 DECALCIFY TISSUE: CPT | Mod: 26 | Performed by: PATHOLOGY

## 2021-05-06 PROCEDURE — 82247 BILIRUBIN TOTAL: CPT

## 2021-05-06 PROCEDURE — 83605 ASSAY OF LACTIC ACID: CPT | Performed by: NURSE PRACTITIONER

## 2021-05-06 PROCEDURE — 999N000065 XR ABDOMEN PORT 1 VIEWS

## 2021-05-06 PROCEDURE — 87641 MR-STAPH DNA AMP PROBE: CPT | Performed by: STUDENT IN AN ORGANIZED HEALTH CARE EDUCATION/TRAINING PROGRAM

## 2021-05-06 PROCEDURE — 82955 ASSAY OF G6PD ENZYME: CPT | Performed by: STUDENT IN AN ORGANIZED HEALTH CARE EDUCATION/TRAINING PROGRAM

## 2021-05-06 PROCEDURE — 85520 HEPARIN ASSAY: CPT | Performed by: STUDENT IN AN ORGANIZED HEALTH CARE EDUCATION/TRAINING PROGRAM

## 2021-05-06 PROCEDURE — 250N000009 HC RX 250: Performed by: STUDENT IN AN ORGANIZED HEALTH CARE EDUCATION/TRAINING PROGRAM

## 2021-05-06 PROCEDURE — 88309 TISSUE EXAM BY PATHOLOGIST: CPT | Mod: TC | Performed by: SURGERY

## 2021-05-06 PROCEDURE — 02PA0QZ REMOVAL OF IMPLANTABLE HEART ASSIST SYSTEM FROM HEART, OPEN APPROACH: ICD-10-PCS | Performed by: SURGERY

## 2021-05-06 PROCEDURE — 83605 ASSAY OF LACTIC ACID: CPT | Performed by: SURGERY

## 2021-05-06 PROCEDURE — C9113 INJ PANTOPRAZOLE SODIUM, VIA: HCPCS | Performed by: STUDENT IN AN ORGANIZED HEALTH CARE EDUCATION/TRAINING PROGRAM

## 2021-05-06 PROCEDURE — 999N000065 XR CHEST PORT 1 VIEW

## 2021-05-06 PROCEDURE — 258N000003 HC RX IP 258 OP 636: Performed by: SURGERY

## 2021-05-06 PROCEDURE — 258N000003 HC RX IP 258 OP 636: Performed by: PHYSICIAN ASSISTANT

## 2021-05-06 PROCEDURE — 99291 CRITICAL CARE FIRST HOUR: CPT | Mod: 24 | Performed by: ANESTHESIOLOGY

## 2021-05-06 PROCEDURE — 85027 COMPLETE CBC AUTOMATED: CPT | Performed by: INTERNAL MEDICINE

## 2021-05-06 PROCEDURE — 250N000011 HC RX IP 250 OP 636: Performed by: ANESTHESIOLOGY

## 2021-05-06 PROCEDURE — C9132 KCENTRA, PER I.U.: HCPCS | Performed by: STUDENT IN AN ORGANIZED HEALTH CARE EDUCATION/TRAINING PROGRAM

## 2021-05-06 PROCEDURE — 94640 AIRWAY INHALATION TREATMENT: CPT | Mod: 76

## 2021-05-06 PROCEDURE — 71045 X-RAY EXAM CHEST 1 VIEW: CPT | Mod: 26 | Performed by: RADIOLOGY

## 2021-05-06 PROCEDURE — 84295 ASSAY OF SERUM SODIUM: CPT

## 2021-05-06 PROCEDURE — 83735 ASSAY OF MAGNESIUM: CPT | Performed by: STUDENT IN AN ORGANIZED HEALTH CARE EDUCATION/TRAINING PROGRAM

## 2021-05-06 PROCEDURE — 99233 SBSQ HOSP IP/OBS HIGH 50: CPT | Mod: 24 | Performed by: INTERNAL MEDICINE

## 2021-05-06 PROCEDURE — 82330 ASSAY OF CALCIUM: CPT

## 2021-05-06 PROCEDURE — 250N000013 HC RX MED GY IP 250 OP 250 PS 637: Performed by: NURSE PRACTITIONER

## 2021-05-06 PROCEDURE — 88311 DECALCIFY TISSUE: CPT | Mod: TC | Performed by: SURGERY

## 2021-05-06 PROCEDURE — 88350 IMFLUOR EA ADDL 1ANTB STN PX: CPT | Mod: 26 | Performed by: PATHOLOGY

## 2021-05-06 PROCEDURE — 85730 THROMBOPLASTIN TIME PARTIAL: CPT | Performed by: STUDENT IN AN ORGANIZED HEALTH CARE EDUCATION/TRAINING PROGRAM

## 2021-05-06 PROCEDURE — 250N000009 HC RX 250: Performed by: SURGERY

## 2021-05-06 PROCEDURE — 85730 THROMBOPLASTIN TIME PARTIAL: CPT | Performed by: INTERNAL MEDICINE

## 2021-05-06 PROCEDURE — 250N000011 HC RX IP 250 OP 636: Performed by: STUDENT IN AN ORGANIZED HEALTH CARE EDUCATION/TRAINING PROGRAM

## 2021-05-06 PROCEDURE — 84100 ASSAY OF PHOSPHORUS: CPT | Performed by: STUDENT IN AN ORGANIZED HEALTH CARE EDUCATION/TRAINING PROGRAM

## 2021-05-06 PROCEDURE — 250N000009 HC RX 250: Performed by: ANESTHESIOLOGY

## 2021-05-06 PROCEDURE — 85396 CLOTTING ASSAY WHOLE BLOOD: CPT | Performed by: INTERNAL MEDICINE

## 2021-05-06 PROCEDURE — 999N000157 HC STATISTIC RCP TIME EA 10 MIN

## 2021-05-06 PROCEDURE — 85610 PROTHROMBIN TIME: CPT | Performed by: INTERNAL MEDICINE

## 2021-05-06 PROCEDURE — 0BQK0ZZ REPAIR RIGHT LUNG, OPEN APPROACH: ICD-10-PCS | Performed by: SURGERY

## 2021-05-06 PROCEDURE — 250N000011 HC RX IP 250 OP 636: Performed by: PHYSICIAN ASSISTANT

## 2021-05-06 PROCEDURE — 250N000015 HC RX FACTOR IP MED 636 OP 636: Performed by: STUDENT IN AN ORGANIZED HEALTH CARE EDUCATION/TRAINING PROGRAM

## 2021-05-06 PROCEDURE — 3E043XZ INTRODUCTION OF VASOPRESSOR INTO CENTRAL VEIN, PERCUTANEOUS APPROACH: ICD-10-PCS | Performed by: SURGERY

## 2021-05-06 PROCEDURE — 85384 FIBRINOGEN ACTIVITY: CPT | Performed by: INTERNAL MEDICINE

## 2021-05-06 PROCEDURE — 74018 RADEX ABDOMEN 1 VIEW: CPT | Mod: 26 | Performed by: RADIOLOGY

## 2021-05-06 PROCEDURE — 812N000010 HC ACQUISITION HEART CADAVER

## 2021-05-06 PROCEDURE — 200N000002 HC R&B ICU UMMC

## 2021-05-06 PROCEDURE — 250N000013 HC RX MED GY IP 250 OP 250 PS 637: Performed by: SURGERY

## 2021-05-06 PROCEDURE — 94002 VENT MGMT INPAT INIT DAY: CPT

## 2021-05-06 PROCEDURE — P9041 ALBUMIN (HUMAN),5%, 50ML: HCPCS | Performed by: STUDENT IN AN ORGANIZED HEALTH CARE EDUCATION/TRAINING PROGRAM

## 2021-05-06 PROCEDURE — 82805 BLOOD GASES W/O2 SATURATION: CPT | Performed by: SURGERY

## 2021-05-06 PROCEDURE — 82947 ASSAY GLUCOSE BLOOD QUANT: CPT

## 2021-05-06 PROCEDURE — 85610 PROTHROMBIN TIME: CPT | Performed by: STUDENT IN AN ORGANIZED HEALTH CARE EDUCATION/TRAINING PROGRAM

## 2021-05-06 PROCEDURE — 84132 ASSAY OF SERUM POTASSIUM: CPT

## 2021-05-06 PROCEDURE — 258N000003 HC RX IP 258 OP 636: Performed by: STUDENT IN AN ORGANIZED HEALTH CARE EDUCATION/TRAINING PROGRAM

## 2021-05-06 PROCEDURE — 82040 ASSAY OF SERUM ALBUMIN: CPT

## 2021-05-06 PROCEDURE — 250N000011 HC RX IP 250 OP 636: Performed by: SURGERY

## 2021-05-06 PROCEDURE — 93010 ELECTROCARDIOGRAM REPORT: CPT | Mod: 59 | Performed by: INTERNAL MEDICINE

## 2021-05-06 PROCEDURE — 94640 AIRWAY INHALATION TREATMENT: CPT

## 2021-05-06 PROCEDURE — 80048 BASIC METABOLIC PNL TOTAL CA: CPT | Performed by: STUDENT IN AN ORGANIZED HEALTH CARE EDUCATION/TRAINING PROGRAM

## 2021-05-06 PROCEDURE — 84155 ASSAY OF PROTEIN SERUM: CPT

## 2021-05-06 PROCEDURE — P9037 PLATE PHERES LEUKOREDU IRRAD: HCPCS | Performed by: INTERNAL MEDICINE

## 2021-05-06 PROCEDURE — 88300 SURGICAL PATH GROSS: CPT | Mod: 26 | Performed by: PATHOLOGY

## 2021-05-06 PROCEDURE — 84132 ASSAY OF SERUM POTASSIUM: CPT | Performed by: INTERNAL MEDICINE

## 2021-05-06 PROCEDURE — 258N000003 HC RX IP 258 OP 636: Performed by: ANESTHESIOLOGY

## 2021-05-06 PROCEDURE — 84075 ASSAY ALKALINE PHOSPHATASE: CPT

## 2021-05-06 PROCEDURE — 84460 ALANINE AMINO (ALT) (SGPT): CPT

## 2021-05-06 PROCEDURE — 5A1221Z PERFORMANCE OF CARDIAC OUTPUT, CONTINUOUS: ICD-10-PCS | Performed by: SURGERY

## 2021-05-06 PROCEDURE — 82803 BLOOD GASES ANY COMBINATION: CPT

## 2021-05-06 PROCEDURE — 93005 ELECTROCARDIOGRAM TRACING: CPT

## 2021-05-06 PROCEDURE — 82803 BLOOD GASES ANY COMBINATION: CPT | Performed by: STUDENT IN AN ORGANIZED HEALTH CARE EDUCATION/TRAINING PROGRAM

## 2021-05-06 PROCEDURE — 82810 BLOOD GASES O2 SAT ONLY: CPT

## 2021-05-06 PROCEDURE — 02YA0Z0 TRANSPLANTATION OF HEART, ALLOGENEIC, OPEN APPROACH: ICD-10-PCS | Performed by: SURGERY

## 2021-05-06 PROCEDURE — 80048 BASIC METABOLIC PNL TOTAL CA: CPT

## 2021-05-06 PROCEDURE — 83605 ASSAY OF LACTIC ACID: CPT | Performed by: STUDENT IN AN ORGANIZED HEALTH CARE EDUCATION/TRAINING PROGRAM

## 2021-05-06 PROCEDURE — 88309 TISSUE EXAM BY PATHOLOGIST: CPT | Mod: 26 | Performed by: PATHOLOGY

## 2021-05-06 PROCEDURE — 0JPT0PZ REMOVAL OF CARDIAC RHYTHM RELATED DEVICE FROM TRUNK SUBCUTANEOUS TISSUE AND FASCIA, OPEN APPROACH: ICD-10-PCS | Performed by: SURGERY

## 2021-05-06 PROCEDURE — 999N001017 HC STATISTIC GLUCOSE BY METER IP

## 2021-05-06 PROCEDURE — 88300 SURGICAL PATH GROSS: CPT | Mod: TC | Performed by: SURGERY

## 2021-05-06 PROCEDURE — 87640 STAPH A DNA AMP PROBE: CPT | Performed by: STUDENT IN AN ORGANIZED HEALTH CARE EDUCATION/TRAINING PROGRAM

## 2021-05-06 RX ORDER — SODIUM CHLORIDE, SODIUM LACTATE, POTASSIUM CHLORIDE, CALCIUM CHLORIDE 600; 310; 30; 20 MG/100ML; MG/100ML; MG/100ML; MG/100ML
INJECTION, SOLUTION INTRAVENOUS CONTINUOUS
Status: DISCONTINUED | OUTPATIENT
Start: 2021-05-06 | End: 2021-05-07

## 2021-05-06 RX ORDER — NOREPINEPHRINE BITARTRATE 0.06 MG/ML
0.03-0.4 INJECTION, SOLUTION INTRAVENOUS CONTINUOUS
Status: DISCONTINUED | OUTPATIENT
Start: 2021-05-06 | End: 2021-05-09

## 2021-05-06 RX ORDER — LIDOCAINE HYDROCHLORIDE 20 MG/ML
INJECTION, SOLUTION INFILTRATION; PERINEURAL PRN
Status: DISCONTINUED | OUTPATIENT
Start: 2021-05-06 | End: 2021-05-06

## 2021-05-06 RX ORDER — HYDROMORPHONE HYDROCHLORIDE 1 MG/ML
0.4 INJECTION, SOLUTION INTRAMUSCULAR; INTRAVENOUS; SUBCUTANEOUS
Status: DISCONTINUED | OUTPATIENT
Start: 2021-05-06 | End: 2021-05-25

## 2021-05-06 RX ORDER — LIDOCAINE 40 MG/G
CREAM TOPICAL
Status: DISCONTINUED | OUTPATIENT
Start: 2021-05-06 | End: 2021-05-11

## 2021-05-06 RX ORDER — HEPARIN SODIUM 1000 [USP'U]/ML
INJECTION, SOLUTION INTRAVENOUS; SUBCUTANEOUS PRN
Status: DISCONTINUED | OUTPATIENT
Start: 2021-05-06 | End: 2021-05-06

## 2021-05-06 RX ORDER — FLUTICASONE PROPIONATE AND SALMETEROL XINAFOATE 115; 21 UG/1; UG/1
2 AEROSOL, METERED RESPIRATORY (INHALATION) EVERY 12 HOURS
Status: DISCONTINUED | OUTPATIENT
Start: 2021-05-06 | End: 2021-05-09

## 2021-05-06 RX ORDER — NICOTINE POLACRILEX 4 MG
15-30 LOZENGE BUCCAL
Status: DISCONTINUED | OUTPATIENT
Start: 2021-05-06 | End: 2021-05-06

## 2021-05-06 RX ORDER — PROTAMINE SULFATE 10 MG/ML
INJECTION, SOLUTION INTRAVENOUS PRN
Status: DISCONTINUED | OUTPATIENT
Start: 2021-05-06 | End: 2021-05-06

## 2021-05-06 RX ORDER — DOCUSATE SODIUM 100 MG/1
100 CAPSULE, LIQUID FILLED ORAL 2 TIMES DAILY
Status: DISCONTINUED | OUTPATIENT
Start: 2021-05-06 | End: 2021-05-06

## 2021-05-06 RX ORDER — CALCIUM CHLORIDE 100 MG/ML
INJECTION INTRAVENOUS; INTRAVENTRICULAR PRN
Status: DISCONTINUED | OUTPATIENT
Start: 2021-05-06 | End: 2021-05-06

## 2021-05-06 RX ORDER — PROPOFOL 10 MG/ML
5-75 INJECTION, EMULSION INTRAVENOUS CONTINUOUS
Status: DISCONTINUED | OUTPATIENT
Start: 2021-05-06 | End: 2021-05-08

## 2021-05-06 RX ORDER — ALBUMIN, HUMAN INJ 5% 5 %
500 SOLUTION INTRAVENOUS ONCE
Status: COMPLETED | OUTPATIENT
Start: 2021-05-06 | End: 2021-05-06

## 2021-05-06 RX ORDER — POTASSIUM CHLORIDE 20MEQ/15ML
20 LIQUID (ML) ORAL ONCE
Status: COMPLETED | OUTPATIENT
Start: 2021-05-06 | End: 2021-05-06

## 2021-05-06 RX ORDER — PREDNISONE 20 MG/1
40 TABLET ORAL 2 TIMES DAILY
Status: COMPLETED | OUTPATIENT
Start: 2021-05-10 | End: 2021-05-12

## 2021-05-06 RX ORDER — OXYCODONE HYDROCHLORIDE 10 MG/1
10 TABLET ORAL EVERY 4 HOURS PRN
Status: DISCONTINUED | OUTPATIENT
Start: 2021-05-06 | End: 2021-05-07

## 2021-05-06 RX ORDER — POLYETHYLENE GLYCOL 3350 17 G/17G
17 POWDER, FOR SOLUTION ORAL DAILY
Status: DISCONTINUED | OUTPATIENT
Start: 2021-05-07 | End: 2021-05-09

## 2021-05-06 RX ORDER — ONDANSETRON 2 MG/ML
4 INJECTION INTRAMUSCULAR; INTRAVENOUS EVERY 6 HOURS PRN
Status: DISCONTINUED | OUTPATIENT
Start: 2021-05-06 | End: 2021-05-31 | Stop reason: HOSPADM

## 2021-05-06 RX ORDER — HYDROMORPHONE HCL IN WATER/PF 6 MG/30 ML
0.2 PATIENT CONTROLLED ANALGESIA SYRINGE INTRAVENOUS
Status: DISCONTINUED | OUTPATIENT
Start: 2021-05-06 | End: 2021-05-31 | Stop reason: HOSPADM

## 2021-05-06 RX ORDER — DEXTROSE MONOHYDRATE 100 MG/ML
INJECTION, SOLUTION INTRAVENOUS CONTINUOUS PRN
Status: DISCONTINUED | OUTPATIENT
Start: 2021-05-06 | End: 2021-05-31 | Stop reason: HOSPADM

## 2021-05-06 RX ORDER — MAGNESIUM SULFATE HEPTAHYDRATE 500 MG/ML
INJECTION, SOLUTION INTRAMUSCULAR; INTRAVENOUS PRN
Status: DISCONTINUED | OUTPATIENT
Start: 2021-05-06 | End: 2021-05-06

## 2021-05-06 RX ORDER — NYSTATIN 100000/ML
1000000 SUSPENSION, ORAL (FINAL DOSE FORM) ORAL 4 TIMES DAILY
Status: DISCONTINUED | OUTPATIENT
Start: 2021-05-11 | End: 2021-05-19

## 2021-05-06 RX ORDER — BUPROPION HYDROCHLORIDE 100 MG/1
100 TABLET ORAL 2 TIMES DAILY
Status: DISCONTINUED | OUTPATIENT
Start: 2021-05-06 | End: 2021-05-09

## 2021-05-06 RX ORDER — IPRATROPIUM BROMIDE AND ALBUTEROL SULFATE 2.5; .5 MG/3ML; MG/3ML
3 SOLUTION RESPIRATORY (INHALATION)
Status: DISCONTINUED | OUTPATIENT
Start: 2021-05-06 | End: 2021-05-08

## 2021-05-06 RX ORDER — PROCHLORPERAZINE MALEATE 5 MG
10 TABLET ORAL EVERY 6 HOURS PRN
Status: DISCONTINUED | OUTPATIENT
Start: 2021-05-06 | End: 2021-05-31 | Stop reason: HOSPADM

## 2021-05-06 RX ORDER — VALGANCICLOVIR HYDROCHLORIDE 50 MG/ML
450 POWDER, FOR SOLUTION ORAL DAILY
Status: DISCONTINUED | OUTPATIENT
Start: 2021-05-11 | End: 2021-05-11 | Stop reason: CLARIF

## 2021-05-06 RX ORDER — ESMOLOL HYDROCHLORIDE 10 MG/ML
INJECTION INTRAVENOUS PRN
Status: DISCONTINUED | OUTPATIENT
Start: 2021-05-06 | End: 2021-05-06

## 2021-05-06 RX ORDER — ACETAMINOPHEN 325 MG/1
975 TABLET ORAL EVERY 8 HOURS
Status: DISCONTINUED | OUTPATIENT
Start: 2021-05-06 | End: 2021-05-08

## 2021-05-06 RX ORDER — VALGANCICLOVIR 450 MG/1
450 TABLET, FILM COATED ORAL DAILY
Status: DISCONTINUED | OUTPATIENT
Start: 2021-05-11 | End: 2021-05-12

## 2021-05-06 RX ORDER — METHYLPREDNISOLONE SODIUM SUCCINATE 125 MG/2ML
125 INJECTION, POWDER, LYOPHILIZED, FOR SOLUTION INTRAMUSCULAR; INTRAVENOUS EVERY 8 HOURS
Status: COMPLETED | OUTPATIENT
Start: 2021-05-06 | End: 2021-05-07

## 2021-05-06 RX ORDER — BISACODYL 10 MG
10 SUPPOSITORY, RECTAL RECTAL DAILY PRN
Status: DISCONTINUED | OUTPATIENT
Start: 2021-05-06 | End: 2021-05-31 | Stop reason: HOSPADM

## 2021-05-06 RX ORDER — OXYCODONE HYDROCHLORIDE 5 MG/1
5 TABLET ORAL EVERY 4 HOURS PRN
Status: DISCONTINUED | OUTPATIENT
Start: 2021-05-06 | End: 2021-05-07

## 2021-05-06 RX ORDER — ONDANSETRON 4 MG/1
4 TABLET, ORALLY DISINTEGRATING ORAL EVERY 6 HOURS PRN
Status: DISCONTINUED | OUTPATIENT
Start: 2021-05-06 | End: 2021-05-31 | Stop reason: HOSPADM

## 2021-05-06 RX ORDER — HYDROXYZINE HYDROCHLORIDE 25 MG/1
25 TABLET, FILM COATED ORAL EVERY 6 HOURS PRN
Status: DISCONTINUED | OUTPATIENT
Start: 2021-05-06 | End: 2021-05-31 | Stop reason: HOSPADM

## 2021-05-06 RX ORDER — PROPOFOL 10 MG/ML
INJECTION, EMULSION INTRAVENOUS CONTINUOUS PRN
Status: DISCONTINUED | OUTPATIENT
Start: 2021-05-06 | End: 2021-05-06

## 2021-05-06 RX ORDER — PREDNISONE 20 MG/1
20 TABLET ORAL 2 TIMES DAILY
Status: DISCONTINUED | OUTPATIENT
Start: 2021-05-18 | End: 2021-05-22

## 2021-05-06 RX ORDER — AMOXICILLIN 250 MG
1 CAPSULE ORAL 2 TIMES DAILY
Status: DISCONTINUED | OUTPATIENT
Start: 2021-05-06 | End: 2021-05-31 | Stop reason: HOSPADM

## 2021-05-06 RX ORDER — DEXTROSE MONOHYDRATE 25 G/50ML
25-50 INJECTION, SOLUTION INTRAVENOUS
Status: DISCONTINUED | OUTPATIENT
Start: 2021-05-06 | End: 2021-05-06

## 2021-05-06 RX ORDER — DAPSONE 25 MG/1
100 TABLET ORAL DAILY
Status: DISCONTINUED | OUTPATIENT
Start: 2021-05-11 | End: 2021-05-09

## 2021-05-06 RX ORDER — ACETAMINOPHEN 325 MG/1
650 TABLET ORAL EVERY 4 HOURS PRN
Status: DISCONTINUED | OUTPATIENT
Start: 2021-05-09 | End: 2021-05-31 | Stop reason: HOSPADM

## 2021-05-06 RX ADMIN — PROTAMINE SULFATE 140 MG: 10 INJECTION, SOLUTION INTRAVENOUS at 04:15

## 2021-05-06 RX ADMIN — PROTHROMBIN, COAGULATION FACTOR VII HUMAN, COAGULATION FACTOR IX HUMAN, COAGULATION FACTOR X HUMAN, PROTEIN C, PROTEIN S HUMAN, AND WATER 1140 UNITS: KIT at 04:32

## 2021-05-06 RX ADMIN — ALLOPURINOL 300 MG: 300 TABLET ORAL at 08:48

## 2021-05-06 RX ADMIN — ALBUMIN HUMAN 500 ML: 0.05 INJECTION, SOLUTION INTRAVENOUS at 07:40

## 2021-05-06 RX ADMIN — ACETAMINOPHEN 975 MG: 325 TABLET, FILM COATED ORAL at 23:20

## 2021-05-06 RX ADMIN — SODIUM BICARBONATE 100 MEQ: 84 INJECTION INTRAVENOUS at 07:59

## 2021-05-06 RX ADMIN — DOCUSATE SODIUM 100 MG: 50 LIQUID ORAL at 20:11

## 2021-05-06 RX ADMIN — PANTOPRAZOLE SODIUM 40 MG: 40 INJECTION, POWDER, FOR SOLUTION INTRAVENOUS at 08:44

## 2021-05-06 RX ADMIN — MONTELUKAST 10 MG: 10 TABLET, FILM COATED ORAL at 22:10

## 2021-05-06 RX ADMIN — HEPARIN SODIUM 40000 UNITS: 1000 INJECTION INTRAVENOUS; SUBCUTANEOUS at 01:25

## 2021-05-06 RX ADMIN — METHYLPREDNISOLONE SODIUM SUCCINATE 125 MG: 125 INJECTION, POWDER, FOR SOLUTION INTRAMUSCULAR; INTRAVENOUS at 20:10

## 2021-05-06 RX ADMIN — ACETAMINOPHEN 975 MG: 325 TABLET, FILM COATED ORAL at 08:49

## 2021-05-06 RX ADMIN — CALCIUM CHLORIDE 1000 MG: 100 INJECTION INTRAVENOUS; INTRAVENTRICULAR at 03:51

## 2021-05-06 RX ADMIN — ROCURONIUM BROMIDE 100 MG: 10 INJECTION INTRAVENOUS at 00:15

## 2021-05-06 RX ADMIN — SUGAMMADEX 200 MG: 100 INJECTION, SOLUTION INTRAVENOUS at 06:55

## 2021-05-06 RX ADMIN — SODIUM CHLORIDE, POTASSIUM CHLORIDE, SODIUM LACTATE AND CALCIUM CHLORIDE: 600; 310; 30; 20 INJECTION, SOLUTION INTRAVENOUS at 08:00

## 2021-05-06 RX ADMIN — MAGNESIUM SULFATE HEPTAHYDRATE 2 G: 500 INJECTION, SOLUTION INTRAMUSCULAR; INTRAVENOUS at 03:42

## 2021-05-06 RX ADMIN — EPINEPHRINE 0.06 MCG/KG/MIN: 1 INJECTION PARENTERAL at 12:01

## 2021-05-06 RX ADMIN — SODIUM CHLORIDE, SODIUM GLUCONATE, SODIUM ACETATE, POTASSIUM CHLORIDE AND MAGNESIUM CHLORIDE: 526; 502; 368; 37; 30 INJECTION, SOLUTION INTRAVENOUS at 04:34

## 2021-05-06 RX ADMIN — FIBRINOGEN (HUMAN) 1 G: KIT INTRAVENOUS at 04:26

## 2021-05-06 RX ADMIN — PROPOFOL 30 MCG/KG/MIN: 10 INJECTION, EMULSION INTRAVENOUS at 06:00

## 2021-05-06 RX ADMIN — HYDROMORPHONE HYDROCHLORIDE 0.5 MG: 1 INJECTION, SOLUTION INTRAMUSCULAR; INTRAVENOUS; SUBCUTANEOUS at 05:30

## 2021-05-06 RX ADMIN — HUMAN INSULIN 2 UNITS/HR: 100 INJECTION, SOLUTION SUBCUTANEOUS at 00:18

## 2021-05-06 RX ADMIN — HYDROMORPHONE HYDROCHLORIDE 0.5 MG: 1 INJECTION, SOLUTION INTRAMUSCULAR; INTRAVENOUS; SUBCUTANEOUS at 06:12

## 2021-05-06 RX ADMIN — MYCOPHENOLATE MOFETIL 1500 MG: 500 INJECTION, POWDER, LYOPHILIZED, FOR SOLUTION INTRAVENOUS at 20:10

## 2021-05-06 RX ADMIN — GABAPENTIN 600 MG: 300 CAPSULE ORAL at 14:29

## 2021-05-06 RX ADMIN — FLUTICASONE PROPIONATE AND SALMETEROL XINAFOATE 2 PUFF: 115; 21 AEROSOL, METERED RESPIRATORY (INHALATION) at 19:46

## 2021-05-06 RX ADMIN — HUMAN INSULIN 3 UNITS/HR: 100 INJECTION, SOLUTION SUBCUTANEOUS at 18:00

## 2021-05-06 RX ADMIN — SENNOSIDES AND DOCUSATE SODIUM 1 TABLET: 8.6; 5 TABLET ORAL at 20:10

## 2021-05-06 RX ADMIN — SODIUM CHLORIDE 1.25 G/HR: 900 INJECTION, SOLUTION INTRAVENOUS at 00:20

## 2021-05-06 RX ADMIN — Medication 20 NG/KG/MIN: at 14:02

## 2021-05-06 RX ADMIN — PROTAMINE SULFATE 20 MG: 10 INJECTION, SOLUTION INTRAVENOUS at 05:02

## 2021-05-06 RX ADMIN — BUPROPION HYDROCHLORIDE 100 MG: 100 TABLET, FILM COATED ORAL at 08:48

## 2021-05-06 RX ADMIN — ISOPROTERENOL HYDROCHLORIDE 0.01 MCG/KG/MIN: 0.2 INJECTION, SOLUTION INTRAMUSCULAR; INTRAVENOUS at 14:59

## 2021-05-06 RX ADMIN — FLUTICASONE PROPIONATE AND SALMETEROL XINAFOATE 2 PUFF: 115; 21 AEROSOL, METERED RESPIRATORY (INHALATION) at 11:57

## 2021-05-06 RX ADMIN — PROPOFOL 20 MCG/KG/MIN: 10 INJECTION, EMULSION INTRAVENOUS at 10:00

## 2021-05-06 RX ADMIN — ISOPROTERENOL HYDROCHLORIDE 0.01 MCG/KG/MIN: 0.2 INJECTION, SOLUTION INTRAMUSCULAR; INTRAVENOUS at 07:46

## 2021-05-06 RX ADMIN — EPINEPHRINE 0.03 MCG/KG/MIN: 1 INJECTION PARENTERAL at 03:41

## 2021-05-06 RX ADMIN — HUMAN INSULIN 2 UNITS/HR: 100 INJECTION, SOLUTION SUBCUTANEOUS at 11:14

## 2021-05-06 RX ADMIN — GABAPENTIN 300 MG: 300 CAPSULE ORAL at 08:49

## 2021-05-06 RX ADMIN — CALCIUM CHLORIDE 1000 MG: 100 INJECTION INTRAVENOUS; INTRAVENTRICULAR at 04:42

## 2021-05-06 RX ADMIN — ISOPROTERENOL HYDROCHLORIDE 0.03 MCG/KG/MIN: 0.2 INJECTION, SOLUTION INTRAMUSCULAR; INTRAVENOUS at 03:42

## 2021-05-06 RX ADMIN — Medication 20 NG/KG/MIN: at 03:43

## 2021-05-06 RX ADMIN — BUPROPION HYDROCHLORIDE 100 MG: 100 TABLET, FILM COATED ORAL at 20:34

## 2021-05-06 RX ADMIN — CEFEPIME HYDROCHLORIDE 2 G: 2 INJECTION, POWDER, FOR SOLUTION INTRAVENOUS at 08:37

## 2021-05-06 RX ADMIN — SODIUM CHLORIDE, SODIUM GLUCONATE, SODIUM ACETATE, POTASSIUM CHLORIDE AND MAGNESIUM CHLORIDE: 526; 502; 368; 37; 30 INJECTION, SOLUTION INTRAVENOUS at 02:28

## 2021-05-06 RX ADMIN — LIDOCAINE HYDROCHLORIDE 100 MG: 20 INJECTION, SOLUTION INFILTRATION; PERINEURAL at 03:40

## 2021-05-06 RX ADMIN — PROTHROMBIN, COAGULATION FACTOR VII HUMAN, COAGULATION FACTOR IX HUMAN, COAGULATION FACTOR X HUMAN, PROTEIN C, PROTEIN S HUMAN, AND WATER 1140 UNITS: KIT at 04:28

## 2021-05-06 RX ADMIN — FIBRINOGEN (HUMAN) 1 G: KIT INTRAVENOUS at 04:30

## 2021-05-06 RX ADMIN — SODIUM CHLORIDE, POTASSIUM CHLORIDE, SODIUM LACTATE AND CALCIUM CHLORIDE 500 ML: 600; 310; 30; 20 INJECTION, SOLUTION INTRAVENOUS at 17:16

## 2021-05-06 RX ADMIN — Medication 50 MCG/HR: at 11:02

## 2021-05-06 RX ADMIN — VANCOMYCIN HYDROCHLORIDE 1500 MG: 10 INJECTION, POWDER, LYOPHILIZED, FOR SOLUTION INTRAVENOUS at 17:13

## 2021-05-06 RX ADMIN — IPRATROPIUM BROMIDE AND ALBUTEROL SULFATE 3 ML: .5; 3 SOLUTION RESPIRATORY (INHALATION) at 15:55

## 2021-05-06 RX ADMIN — EPINEPHRINE 0.1 MCG/KG/MIN: 1 INJECTION PARENTERAL at 07:43

## 2021-05-06 RX ADMIN — SODIUM CHLORIDE, POTASSIUM CHLORIDE, SODIUM LACTATE AND CALCIUM CHLORIDE 500 ML: 600; 310; 30; 20 INJECTION, SOLUTION INTRAVENOUS at 09:30

## 2021-05-06 RX ADMIN — ESMOLOL HYDROCHLORIDE 20 MG: 10 INJECTION, SOLUTION INTRAVENOUS at 03:44

## 2021-05-06 RX ADMIN — ROCURONIUM BROMIDE 50 MG: 10 INJECTION INTRAVENOUS at 02:32

## 2021-05-06 RX ADMIN — Medication 0.1 MCG/KG/MIN: at 07:46

## 2021-05-06 RX ADMIN — SENNOSIDES AND DOCUSATE SODIUM 1 TABLET: 8.6; 5 TABLET ORAL at 08:49

## 2021-05-06 RX ADMIN — IPRATROPIUM BROMIDE AND ALBUTEROL SULFATE 3 ML: .5; 3 SOLUTION RESPIRATORY (INHALATION) at 19:45

## 2021-05-06 RX ADMIN — PROPOFOL 40 MCG/KG/MIN: 10 INJECTION, EMULSION INTRAVENOUS at 08:28

## 2021-05-06 RX ADMIN — IPRATROPIUM BROMIDE AND ALBUTEROL SULFATE 3 ML: .5; 3 SOLUTION RESPIRATORY (INHALATION) at 11:56

## 2021-05-06 RX ADMIN — Medication 37.5 MG: at 22:08

## 2021-05-06 RX ADMIN — SODIUM CHLORIDE 500 MG: 9 INJECTION, SOLUTION INTRAVENOUS at 03:31

## 2021-05-06 RX ADMIN — MYCOPHENOLATE MOFETIL 1500 MG: 500 INJECTION, POWDER, LYOPHILIZED, FOR SOLUTION INTRAVENOUS at 09:35

## 2021-05-06 RX ADMIN — GABAPENTIN 600 MG: 300 CAPSULE ORAL at 22:50

## 2021-05-06 RX ADMIN — ACETAMINOPHEN 975 MG: 325 TABLET, FILM COATED ORAL at 14:28

## 2021-05-06 RX ADMIN — METHYLPREDNISOLONE SODIUM SUCCINATE 125 MG: 125 INJECTION, POWDER, FOR SOLUTION INTRAMUSCULAR; INTRAVENOUS at 11:22

## 2021-05-06 RX ADMIN — CEFEPIME HYDROCHLORIDE 2 G: 2 INJECTION, POWDER, FOR SOLUTION INTRAVENOUS at 20:10

## 2021-05-06 RX ADMIN — POTASSIUM CHLORIDE 20 MEQ: 20 SOLUTION ORAL at 09:44

## 2021-05-06 RX ADMIN — FENTANYL CITRATE 500 MCG: 50 INJECTION, SOLUTION INTRAMUSCULAR; INTRAVENOUS at 04:12

## 2021-05-06 ASSESSMENT — ACTIVITIES OF DAILY LIVING (ADL)
ADLS_ACUITY_SCORE: 12
ADLS_ACUITY_SCORE: 19

## 2021-05-06 NOTE — ANESTHESIA PROCEDURE NOTES
Arterial Line Procedure Note  Pre-Procedure   Staff -        Anesthesiologist:  Corey Tamez MD       Resident/Fellow: Yunior Betancourt DO       Other Anesthesia Staff: Sera Jurado MD       Performed By: with residents       Procedure performed by resident/CRNA in presence of a teaching physician.         Location: OR       Pre-Anesthestic Checklist: patient identified, IV checked, risks and benefits discussed, informed consent, monitors and equipment checked, pre-op evaluation and at physician/surgeon's request  Timeout:       Correct Patient: Yes        Correct Procedure: Yes        Correct Site: Yes        Correct Position: Yes   Procedure   Procedure: arterial line       Laterality: right       Insertion Site: radial.  Sterile Prep        Standard elements of sterile barrier followed       Skin prep: Chloraprep  Insertion/Injection        Technique: ultrasound guided and Seldinger Technique        - Artery evaluated via U/S for patency/adequacy of catheter insertion is adequate, and using realtime U/S imaging the artery was punctured, and needle was observed entering artery on U/S       Catheter Type/Size: 20 G, 12 cm  Narrative        Tegaderm and Biopatch dressing used.       Complications: None apparent,        Arterial waveform: Yes        IBP within 10% of NIBP: Yes

## 2021-05-06 NOTE — ANESTHESIA CARE TRANSFER NOTE
Patient: Jim Willingham    Procedure(s):  Redo median sternotomy, lysis of adhesions, heart transplant recipient, on cardiopulmonary bypass, intraoperative transesophageal echocardiogram per anesthesia, Implantable Cardioverter Defibrillator (ICD) removal    Diagnosis: Myopathy [G72.9]  Diagnosis Additional Information: No value filed.    Anesthesia Type:   General     Note:    Oropharynx: ventilatory support  Level of Consciousness: drowsy      Independent Airway: airway patency not satisfactory and stable  Dentition: dentition unchanged  Vital Signs Stable: post-procedure vital signs reviewed and stable  Report to RN Given: handoff report given  Patient transferred to: ICU    ICU Handoff: Call for PAUSE to initiate/utilize ICU HANDOFF, Identified Patient, Identified Responsible Provider, Reviewed the Pertinent Medical History, Discussed Surgical Course, Reviewed Intra-OP Anesthesia Management and Issues during Anesthesia, Set Expectations for Post Procedure Period and Allowed Opportunity for Questions and Acknowledgement of Understanding      Vitals: (Last set prior to Anesthesia Care Transfer)  CRNA VITALS  5/6/2021 0610 - 5/6/2021 0703      5/6/2021             Resp Rate (observed):  (!) 6    Resp Rate (set):  18        Electronically Signed By: Sera Abreu MD  May 6, 2021  7:03 AM

## 2021-05-06 NOTE — BRIEF OP NOTE
[facility]    Brief Operative Note    Pre-operative diagnosis: Myopathy [G72.9]  Post-operative diagnosis Same as pre-operative diagnosis    Procedure: Procedure(s):  Redo median sternotomy, lysis of adhesions, heart transplant recipient, on cardiopulmonary bypass, intraoperative transesophageal echocardiogram per anesthesia, Implantable Cardioverter Defibrillator (ICD) removal  Surgeon: Surgeon(s) and Role:     * Ronnie Quigley MD - Primary     * Chase Albright MD - Assisting     * Ronit Rogers MD - Fellow - Assisting     * Otilio Potter MD - Fellow - Assisting     * Alexis Odom MD - Fellow - Assisting  Anesthesia: General   Estimated blood loss: 1000cc  Drains: Pleural bilateral; med x3  Specimens:   ID Type Source Tests Collected by Time Destination   1 : explanted ICD and leads Other (specify in comments) Other OR DOCUMENTATION ONLY Ronnie Quigley MD 5/6/2021  3:06 AM    A : native heart and LVAD Tissue Heart SURGICAL PATHOLOGY EXAM Ronnie Quigley MD 5/6/2021  2:23 AM      Findings:   None.  Complications: None.  Implants:   Implant Name Type Inv. Item Serial No.  Lot No. LRB No. Used Action   VENTRICULAR ASSIST DEVICE BLOOD PUMP LVAD    [id number] N/A 1 Explanted   APICAL SEWING RING LVAD   [employer] TOMI  [id number] N/A 1 Explanted   SEALED OUTFLOW BEND RELIEF COLLAR LVAD    [id number] N/A 1 Explanted   SEALED OUTFLOW GRAFT WITH BEND RELIEF LVAD   [employer] TOMI  [id number] N/A 1 Explanted   SEALED INFLOW CONDUIT LVAD    [id number] N/A 1 Explanted   [employer] I* [id number] ATTAIN OTW [id number] Leads  [id number] [employer]   1 Explanted   [employer] I* [id number] CAPSUREFIX NOVUS MRI SURESCAN [id number] Leads  [id number] [employer]   1 Explanted   [employer] I* 6947M SPRINT QUATTRO SECURE MRI SURESCAN [id number] Leads  [id number] [employer]   1 Explanted   [employer] I* [id number] VIVA XT CRT-D [id number] ICD  [id number] [employer]  INC   1 Explanted

## 2021-05-06 NOTE — PROGRESS NOTES
Final Crossmatch   Final crossmatch results for UNOS ID [id number] and recipient sample date 05/04/2021 and 02/09/2021  were both T cell and B cell, allo and auto negative.  DSA not noted.

## 2021-05-06 NOTE — PLAN OF CARE
Pertinent PMH: Jim is a CVTS Pt c/ PMH of T2DM, CKD, COPD, CECILIA and NICM s/p HM 2 who is now s/p OHT overnight on 05/05 with Dr. Quigley. Intra-op complications of ABL s/p 1 U PRBC and VT coming off CPB shocked X2 s/p lido and amio.  Major Shift Events: Lactate at 4.7 in early AM s/p 2 amps of NaHCO3 and 500 albumin. Lactate trending down, last 2.3. Trending lactate Q6Hrs. Sedation vacation completed, Pt is able to follow writer commands, able to move all extremities, neuro status intact. CV: epi at 0.06, norepi 0.1 and isuprel at 0.01, TPW on standby on DDD mode. Resp: AC vent settings at 50% FiO2, 5, 450 at 20 RR. GI: OG placed and advanced per radiology report. Repeat AXR at 1745. : Voiding at 30 mL/Hr. CTs: ~25 mL/Hr in meds/pericardial and minimal OP in pleural CT. CVP 11 - 15, otherwise hemodynamically stable. Family updated at bedside.  Plan: Titrate flolan 20, 10, 5 Q2Hr, stay at 5 per Dr. Quigley until AM rounds. Extubate tomorrow, wean epi/norepi as tolerated, continue to monitor.    For vital signs, hemodynamics and complete assessments, please see documentation flowsheets.

## 2021-05-06 NOTE — H&P
CV ICU H&P  5/6/2021      CO-MORBIDITIES:   Patient Active Problem List   Diagnosis     Paroxysmal atrial fibrillation (H)     Type 2 diabetes mellitus with complication, with long-term current use of insulin (H)     Stage 3b chronic kidney disease     H/O gastric bypass     CECILIA (obstructive sleep apnea)     Vitamin B12 deficiency (non anemic)     Paroxysmal VT (H)     ICD (implantable cardioverter-defibrillator), Medtronic Biventricular ICD - Not Dependent     NICM (nonischemic cardiomyopathy) (H)/ EF 20%     Heart failure (H)     LVAD (left ventricular assist device) present (H)     Long-term (current) use of anticoagulants [Z79.01]     Physical deconditioning     Chronic systolic congestive heart failure (H)     Iron deficiency anemia     Influenza     Dyspnea     Acute-on-chronic kidney injury (H)     Shortness of breath     WALTER (acute kidney injury) (H)     Bacteremia     Class 2 severe obesity due to excess calories with serious comorbidity and body mass index (BMI) of 35.0 to 35.9 in adult (H)     Bilateral carpal tunnel syndrome     Rotator cuff tear arthropathy of both shoulders     COPD (chronic obstructive pulmonary disease) (H)     Major depression, recurrent, chronic (H)     Gouty arthropathy, chronic, without tophi     Right carpal tunnel syndrome     Decompensated heart failure (H)       ASSESSMENT: Jim Willingham is a 65 year old male with pmhx of T2DM, CKD, COPD, CECILIA (cpap), history of NICM s/p Hm2 LVAD, afib who is now s/p heart transplant with Dr. Quigley on 5/6/2021. Intraoperatively, he received 1uPRBC, 2 Plt, 2 fibryga, 2,000 Kcentra. Ischemic time was 157 min. Cross clamp time was 98 min. Coming off bypass, he was shocked > 2x received amio, mag and lidocaine. He arrives to the ICU on epi, NE, isoproterenol, flolan (prophylactic R heart support, and propofol for sedation.      PLAN:  Neuro/ pain/ sedation:  - Monitor neurological status. Notify the MD for any acute changes in exam.  - Fentanyl  gtt for pain.  - Propofol gtt for sedation.  - PTA gabapentin 300mg daily, 600mg BID  - prn Hydromorphone, oxycodone     Pulmonary care:  #COPD  #CECILIA uses cpap   #post op ventilatory support   Ventilation Mode: CMV/AC  (Continuous Mandatory Ventilation/ Assist Control)  FiO2 (%): 50 %  Rate Set (breaths/minute): 20 breaths/min  Tidal Volume Set (mL): (S) 450 mL  PEEP (cm H2O): 5 cmH2O  Oxygen Concentration (%): 50 %  Resp: 20  - Inhaled agents: flolan @ 20   - Titrate FiO2 for SpO2 >92%  - PTA trelegy ellipta (hold)  - Decrease TV to 450, RR 20   - leave flolan today, plan to wean tomorrow   - change PTA inhalers to nebs per vent       Cardiovascular:   #h/o NICM s/p HM2 LVAD now s/p heart transplant with Dr. Quigley on 5/6/21   - Monitor hemodynamic status.   - MAP >65  - chronotropy: isoproterenol   - pressors/inotropes: epi, norepi  - wean Levo as able     GI/Nutrition:   - OG in place, no tube feeds today, reassess tomorrow   - NPO except ice chips and medications.  - No indication for parenteral nutrition.    Fluids/ Electrolytes/Renal:  #lactic acidosis    - TKO for IV fluid hydration.  - Urine output is adequate so far.  - Will continue to monitor intake and output.  - discontinue scheduled potassium   - Creat 1.69, monitor   - amp of bicarb this am as per CVTS     Endocrine:  #T2DM    # Hx of Gout   # Stress hyperglycemia  - Insulin gtt  - Hold Gout rx for now     ID/ Antibiotics:  - Complete perioperative antibiotics  - No other indication for antibiotics.   - Prophylaxis abx post-transplant as per Cards II     Heme:  #acute blood loss anemia   - Hgb goal >7  - trend labs, Hgb 6.8 immediate post-op      Prophylaxis:    - Mechanical prophylaxis for DVT.  - No chemical DVT prophylaxis due to high risk of bleeding.   - Protonix qd    Lines/ tubes/ drains:  - MAC  - [place]  - arterial line  - christopher    Disposition:  - CV ICU.     Patient seen, findings and plan discussed with CV ICU staff, Dr. Rodriguez.    Todd Orellana  DO Whit  Anesthesiology Resident  *[phone]      ====================================    HPI:   Jim Willingham is a 65 year old male with pmhx of T2DM, CKD, COPD, CECILIA (cpap), history of NICM s/p Hm2 LVAD, afib who is now s/p heart transplant with Dr. Quigley on 5/6/2021. Intraoperatively, he received 1uPRBC, 2 Plt, 2 fibryga, 2,000 Kcentra. Ischemic time was 157 min. Cross clamp time was 98 min. Coming off bypass, he was shocked > 2x received amio, mag and lidocaine. He arrives to the ICU on epi, NE, isoproterenol, flolan (prophylactic R heart support, and propofol for sedation.    PAST MEDICAL HISTORY:   Past Medical History:   Diagnosis Date     Bariatric surgery status 2003     Benign essential hypertension 05/11/2017     Bilateral carpal tunnel syndrome 11/02/2020     Cardiomyopathy, unspecified (H) 05/08/2017     CKD (chronic kidney disease) stage 3, GFR 30-59 ml/min 05/11/2017     COPD (chronic obstructive pulmonary disease) (H) 11/02/2020     Depression 05/11/2017     Diabetes mellitus (H) 1995     Gouty arthropathy, chronic, without tophi 11/02/2020     H/O gastric bypass 05/11/2017     ICD (implantable cardioverter-defibrillator), biventricular, in situ 05/11/2017     LVAD (left ventricular assist device) present (H)      Major depression, recurrent, chronic (H) 11/02/2020     NICM (nonischemic cardiomyopathy) (H)/ EF 20% 05/11/2017    ECHO: LVEDd. 7.66 cm, Restrictive pattern , Severe mitral valve regurgitation     CECILIA (obstructive sleep apnea) 05/11/2017     Paroxysmal atrial fibrillation (H) 05/11/2017     Paroxysmal VT (H) 05/11/2017     Rotator cuff tear arthropathy of both shoulders 11/02/2020     Uncomplicated asthma      Vitamin B12 deficiency (non anemic) 05/11/2017       PAST SURGICAL HISTORY:   Past Surgical History:   Procedure Laterality Date     ANESTHESIA CARDIOVERSION N/A 5/11/2020    Procedure: ANESTHESIA, FOR CARDIOVERSION @1100;  Surgeon: GENERIC ANESTHESIA PROVIDER;  Location:  OR      CV RIGHT HEART CATH MEASUREMENTS RECORDED N/A 2019    Procedure: CV RIGHT HEART CATH;  Surgeon: Renu Sears MD;  Location:  HEART CARDIAC CATH LAB     CV RIGHT HEART CATH MEASUREMENTS RECORDED N/A 2020    Procedure: CV RIGHT HEART CATH;  Surgeon: Nicola Seth MD;  Location:  HEART CARDIAC CATH LAB     CV RIGHT HEART CATH MEASUREMENTS RECORDED N/A 2021    Procedure: CV RIGHT HEART CATH;  Surgeon: Jac Dover MD;  Location:  HEART CARDIAC CATH LAB     CV RIGHT HEART CATH MEASUREMENTS RECORDED N/A 2021    Procedure: Heart Cath Right Heart Cath;  Surgeon: Jeffrey Gibson MD;  Location:  HEART CARDIAC CATH LAB     CV RIGHT HEART CATH MEASUREMENTS RECORDED N/A 3/23/2021    Procedure: Heart Cath Right Heart Cath. request for 3/23;  Surgeon: Jeffrey Gibson MD;  Location:  HEART CARDIAC CATH LAB     GI SURGERY      Sylvester en Y     INSERT VENTRICULAR ASSIST DEVICE LEFT (HEARTMATE II) N/A 2017    Procedure: INSERT VENTRICULAR ASSIST DEVICE LEFT (HEARTMATE II);  Median Sternotomy Heartmate II Left Ventricular Assist Device Insertion on Pump Oxygenator;  Surgeon: Ronnie Quigley MD;  Location:  OR     ORTHOPEDIC SURGERY      right knee wired     PICC DOUBLE LUMEN PLACEMENT Right 2020    5FR PICC DL. Length-43cm (1cm out).     RELEASE CARPAL TUNNEL BILATERAL Bilateral 2021    Procedure: Bilateral carpal tunnel release;  Surgeon: Jermaine Brand MD;  Location:  OR       FAMILY HISTORY:   Family History   Problem Relation Age of Onset     Cerebrovascular Disease Mother 64     Diabetes Mother      Hypertension Mother      Coronary Artery Disease Father      Diabetes Type 2  Father      Obesity Brother      Obesity Brother      Cerebrovascular Disease Daughter 40       SOCIAL HISTORY:   Social History     Tobacco Use     Smoking status: Former Smoker     Quit date:      Years since quittin.3     Smokeless tobacco: Never Used    Substance Use Topics     Alcohol use: No         OBJECTIVE:  1. VITAL SIGNS:   Temp:  [97.8  F (36.6  C)-98.1  F (36.7  C)] 98  F (36.7  C)  Pulse:  [] 118  Resp:  [18-20] 20  BP: ()/(55-88) 93/75  SpO2:  [96 %-100 %] 100 %    Ventilation Mode: CMV/AC  (Continuous Mandatory Ventilation/ Assist Control)  Rate Set (breaths/minute): 16 breaths/min  Tidal Volume Set (mL): 620 mL  PEEP (cm H2O): 5 cmH2O  Oxygen Concentration (%): 100 %  Resp: 20        2. INTAKE/ OUTPUT:   I/O last 3 completed shifts:  In: 3745 [P.O.:60; I.V.:2087; Other:800]  Out: 2250 [Urine:2250]        3. PHYSICAL EXAMINATION:   General: sedated  Neuro: sedated  Resp: MV  CV: sinus tachycardic   Abdomen: Soft, Non-distended  Incisions: c/d/i   Extremities: warm and well perfused    4. INVESTIGATIONS:   Arterial Blood Gases   Recent Labs   Lab 05/06/21  0448 05/06/21  0426 05/05/21  1503 05/05/21  1118 05/05/21  0900 05/05/21  0628 05/04/21  2313   WBC  --  22.6*  --   --  3.2* 3.5* 4.6   HGB 6.8* 9.3*  --   --  12.9* 13.2* 12.8*   PLT  --  125*  --   --  210 220 216   INR  --  2.02* 1.49*  --   --  1.57* 2.00*     --   --   --   --  132* 135   POTASSIUM 3.6  --   --  4.5  --  4.6 4.0   BUN  --   --   --   --   --  49* 50*   CR  --   --   --   --   --  1.59* 1.60*           5. RADIOLOGY:     =========================================

## 2021-05-06 NOTE — ANESTHESIA PROCEDURE NOTES
Airway       Patient location during procedure: OR  Staff -        Anesthesiologist:  Corey Tamez MD       Resident/Fellow: Yunior Betancourt DO       Performed By: with fellow  Consent for Airway        Urgency: elective  Indications and Patient Condition       Indications for airway management: jason-procedural        Final Airway Details       Final airway type: endotracheal airway       Successful airway: ETT - single  Endotracheal Airway Details        ETT size (mm): 8.0       Cuffed: yes       Successful intubation technique: direct laryngoscopy       DL Blade Type: MAC 3       Grade View of Cords: 2       Adjucts: bougie       Position: Right       Measured from: lips       Secured at (cm): 23       Bite block used: None    Post intubation assessment        Placement verified by: capnometry, equal breath sounds and chest rise        Number of attempts at approach: 1       Secured with: pink tape       Ease of procedure: easy       Dentition: Intact and Unchanged    Medication(s) Administered   Medication Administration Time: 5/5/2021 10:35 PM

## 2021-05-06 NOTE — ANESTHESIA PROCEDURE NOTES
CHUN Probe Insertion Note:    Staff -        Anesthesiologist:  Corey Tamez MD       Resident/Fellow: Yunior Betancourt DO       Performed By: fellow       Procedure performed by resident/fellow/CRNA in presence of a teaching physician.    Probe Number: x7 #5  Probe Status PRE Insertion: NO obvious damage  Probe type:  Adult 3D  Bite block used:   Yes  Insertion Technique: Easy, no oropharyngeal manipulation  Insertion complications: None obvious  Billing Report:CHUN report by Anesthesiologist (See Separate Report note)    Probe Status POST Removal: NO obvious damage

## 2021-05-06 NOTE — PROGRESS NOTES
[facility]  CARDIOLOGY HEART FAILURE SERVICE (CARDS II) PROGRESS NOTE    Patient Name: Jim Willingham    Medical Record Number: [id number]    YOB: 1955  PCP: Ricardo Gómez    Admit Date/Time: 2/8/2021  2:55 PM   87    Assessment and Plan:  Mr. Jim Willingham is a 65 year old male with history of NICM EF 20% c/b VT s/p CRT-D s/p HMII LVAD 6/19/2017 originally intended as destination therapy 2/2 obesity however with recent weight loss now candidate for transplantation. He is admitted for worsening functional status and renal function concerning for worsening heart failure. Listed status 2E for transplant s/p OHT 5/6/2021       Faculty Attestation  Duran Mccarty M.D.    I personally saw and examined this patient, reviewed recent laboratories and imaging studies, discussed the case with the housestaff, and conveyed plan to the patient.  I answered all questions from patient and/or family. I agree with the examination, assessment and plan outlined here.         Today's plan:   - continue epinephrine  - wean norepinephrine as tolerated  - wean inhaled eopprostenol  - maintain HR > 100  - monitor hemodynamics Q4h  - methylprednisone    #S/p OHT  #Chronic systolic heart failure secondary to NICM s/p HM II LVAD.   Pressors: on levophed, epinephrine   Other drips: isoproterenol, propofol, insulin, fentanyl   Epicardial leads in place, currently  with isoproterenol   Hemodynamics this AM: CVP 11, PA 26/14 (18), PCWP 10    Serostatus: Donor unknown  CMV: D?, R+, EBV: R+, Toxo R-  Blood type: O+     Immunosuppression:  -Calcineurin Inhibitor: due to GFR 30-59, anti-IL-2 given - Basiliximab 20 mg IV , second dose due 5/10/2021; tacrolimus to follow pending renal function with goal level 10-15 for first 3 months  -Cell cycle Inhibitor: start Cellcept 1500 mg BID (generic 1500 mg given preoperatively)  -Steroid: methylprednisolone 1 gram given preoperatively, followed by 500 mg  IV at release of cross clamp; start 125 mg IV q8 hours x 3 doses tomorrow morning, followed by prednisone 1 mg/kg daily (in BID dosing, taper 5 mg daily)  -Endomyocardial biopsy: first ; weekly 4 weeks, bi-weekly 2 months, monthly 3-6 months     Infection Prophylaxis:  -PCP/Toxo: Bactrim preferred with toxo status, holding for now given renal function   -Thrush: Nystatin  -CMV (D/R-): will start Valcyte pending renal function; 3-6 months total (pending donor status) through at least   -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 Duran Mccarty MD, attending physician, who agrees with the assessment and plan above.     Karthik Wilson MD  Cardiology Fellow    Interval Changes in Past 24 Hours:   Went to OHT at 10:00pm. Intraoperatively received  1U PRBC, 2plt, 2 fibygn    Review Of Systems  A 4-point ROS was negative aside from those listed above.    OBJECTIVE FINDINGS:    Temp:  [97.8  F (36.6  C)-98.1  F (36.7  C)] 98  F (36.7  C)  Pulse:  [71-96] 71  Resp:  [18-20] 20  BP: ()/(55-88) 93/75  SpO2:  [96 %-100 %] 99 %    Gen: Patient is intubated, sedated, NAD.    HEENT: PERRLA, EOMI, MMM  Neck: JVP estimated at 10cm  Resp: clear to auscultation bilaterally, no crackles or wheezing   CV: RRR, no murmurs appreciated  Abd: soft, NT, ND, no hepatosplenomegaly, normal BS  Ext: warm and well perfused, no LE edema    Lines, Tubes, and Devices:  Vascular Access:   - RIJ 9Fr MAC sheath, PA catheter (Insertion date: 5/6/2021)  - Right radial arterial line (Insertion date: 5/6/2021)  Tubes:  - Russell cath 5/6/2021  - ETT (Insertion date 5/6/2021)  - Chest tubes, x1 pericardial, x1 mediastinal    Devices:    Invasive Hemodynamic Monitoring:  CVP: 11  PCWP: 10  PAP: 26/14(18)  Mixed venous O2 sat: 72%  Estimated CO/ CI: 7.1/3.2  CVP 11, PA 26/14 (18), PCWP 10  Intake/Output Summary (Last 24 hours) at 5/6/2021 0644  Last data filed at 5/6/2021 0553  Gross per  24 hour   Intake 3745 ml   Output 2250 ml   Net 1495 ml     Wt Readings from Last 5 Encounters:   05/05/21 102.3 kg (225 lb 8 oz)   04/27/21 102.5 kg (226 lb)   04/21/21 103 kg (227 lb)   04/14/21 101.2 kg (223 lb)   04/07/21 99.8 kg (220 lb)       Current medications     acarbose  25 mg Oral BID     acetaminophen  975 mg Oral Q8H     allopurinol  300 mg Oral Daily     amiodarone  200 mg Oral Daily     amitriptyline  37.5 mg Oral At Bedtime     anakinra  100 mg Subcutaneous Every Other Day     aspirin  81 mg Oral or Feeding Tube Daily     bumetanide  4 mg Oral BID     buPROPion  100 mg Oral BID     ceFEPIme (MAXIPIME) IV  2 g Intravenous Q8H     cetirizine  10 mg Oral Daily     [START ON 5/11/2021] dapsone  100 mg Oral Daily    Or     [START ON 5/11/2021] dapsone  100 mg Oral or NG Tube Daily     docusate sodium  100 mg Oral BID     fluticasone-vilanterol  1 puff Inhalation Daily     gabapentin  300 mg Oral Daily     gabapentin  600 mg Oral BID     hydrALAZINE  75 mg Oral TID     isosorbide dinitrate  10 mg Oral TID     lidocaine  2 patch Transdermal Q24H     lidocaine   Transdermal Q8H     methylPREDNISolone  125 mg Intravenous Q8H     montelukast  10 mg Oral At Bedtime     mycophenolate mofetil  1,500 mg Intravenous Q12H     mycophenolate mofetil  1,500 mg Intravenous Once     [START ON 5/11/2021] nystatin  1,000,000 Units Oral 4x Daily     pantoprazole (PROTONIX) IV  40 mg Intravenous Daily     [START ON 5/7/2021] polyethylene glycol  17 g Oral Daily     potassium chloride  40 mEq Oral BID     prothrombin 4 factor complex concentrate  2,280 Units Intravenous Once     senna-docusate  1 tablet Oral BID     senna-docusate  1 tablet Oral At Bedtime     sodium chloride (PF)  3 mL Intracatheter Q8H     umeclidinium  1 puff Inhalation Daily     [START ON 5/11/2021] valGANciclovir  450 mg Oral Daily    Or     [START ON 5/11/2021] valGANciclovir  450 mg Oral or NG Tube Daily     vancomycin (VANCOCIN) IV  1,500 mg  Intravenous Q12H       EPINEPHrine       epoprostenol (VELETRI) 20 mcg/mL in sterile water inhalation solution 20 ng/kg/min (05/06/21 0343)     isoproterenol (ISUPREL) infusion ADULT       lactated ringers       - MEDICATION INSTRUCTIONS -       norepinephrine       - MEDICATION INSTRUCTIONS -       ACE/ARB/ARNI NOT PRESCRIBED       [START ON 5/9/2021] acetaminophen, acetaminophen, albuterol, alum & mag hydroxide-simethicone, sore throat lozenge, bisacodyl, capsaicin, cyclobenzaprine, glucose **OR** dextrose **OR** glucagon, diclofenac, heparin, HYDROmorphone **OR** HYDROmorphone, hydrOXYzine, lidocaine 4%, lidocaine, lidocaine (buffered or not buffered), magnesium hydroxide, - MEDICATION INSTRUCTIONS -, melatonin, menthol (Topical Analgesic) 2.5%, naloxone **OR** naloxone **OR** naloxone **OR** naloxone, nitroGLYcerin, ondansetron **OR** ondansetron, oxyCODONE **OR** oxyCODONE, - MEDICATION INSTRUCTIONS -, polyethylene glycol, prochlorperazine **OR** prochlorperazine, ACE/ARB/ARNI NOT PRESCRIBED, senna-docusate, simethicone, sodium chloride (PF), sodium chloride (PF), sodium chloride (PF), sodium phosphate, traMADol    LABS Reviewed  IMAGES Reviewed

## 2021-05-06 NOTE — PROGRESS NOTES
VAD Coordinator in OR for Heart Transplant surgery. VAD parameters were monitored in collaboration with anesthesia.       VAD parameters at START of surgery:  o Speed: 8600 rpm  o PI (or flow waveform peak/trough for HW): 5.1  o Flow: 5.4 lpm  o Power: 6.2 carlisle      Speed adjustments made during OR: none    Flow range: 4.8-7.4 lpm    PI (or flow waveform peak/trough for HW) range: 4-5.5    Factors noted to cause a significant variation in VAD parameter:  VT caused the PI to decrease to the low 2s.    Pump shut off at 0152    Please page the VAD Coordinator on-call with any VAD related questions (* * * [phone], job code [phone] from an internal line).     SWAPNA MCCLAIN RN

## 2021-05-06 NOTE — ANESTHESIA PREPROCEDURE EVALUATION
Anesthesia Pre-Procedure Evaluation    Patient: Jim Willingham   MRN: [id number] : 1955        Preoperative Diagnosis: Myopathy [G72.9]   Procedure : Procedure(s):  TRANSPLANT, HEART, RECIPIENT     Past Medical History:   Diagnosis Date     Bariatric surgery status      Benign essential hypertension 2017     Bilateral carpal tunnel syndrome 2020     Cardiomyopathy, unspecified (H) 2017     CKD (chronic kidney disease) stage 3, GFR 30-59 ml/min 2017     COPD (chronic obstructive pulmonary disease) (H) 2020     Depression 2017     Diabetes mellitus (H)      Gouty arthropathy, chronic, without tophi 2020     H/O gastric bypass 2017     ICD (implantable cardioverter-defibrillator), biventricular, in situ 2017     LVAD (left ventricular assist device) present (H)      Major depression, recurrent, chronic (H) 2020     NICM (nonischemic cardiomyopathy) (H)/ EF 20% 2017    ECHO: LVEDd. 7.66 cm, Restrictive pattern , Severe mitral valve regurgitation     CECILIA (obstructive sleep apnea) 2017     Paroxysmal atrial fibrillation (H) 2017     Paroxysmal VT (H) 2017     Rotator cuff tear arthropathy of both shoulders 2020     Uncomplicated asthma      Vitamin B12 deficiency (non anemic) 2017      Past Surgical History:   Procedure Laterality Date     ANESTHESIA CARDIOVERSION N/A 2020    Procedure: ANESTHESIA, FOR CARDIOVERSION @1100;  Surgeon: GENERIC ANESTHESIA PROVIDER;  Location: [facility] OR     CV RIGHT HEART CATH MEASUREMENTS RECORDED N/A 2019    Procedure: CV RIGHT HEART CATH;  Surgeon: Renu Sears MD;  Location: [facility] HEART CARDIAC CATH LAB     CV RIGHT HEART CATH MEASUREMENTS RECORDED N/A 2020    Procedure: CV RIGHT HEART CATH;  Surgeon: Nicola Seth MD;  Location: [facility] HEART CARDIAC CATH LAB     CV RIGHT HEART CATH MEASUREMENTS RECORDED N/A 2021    Procedure: CV RIGHT HEART CATH;  Surgeon: Jazzmine  Jac Rodriguez MD;  Location:  HEART CARDIAC CATH LAB     CV RIGHT HEART CATH MEASUREMENTS RECORDED N/A 2021    Procedure: Heart Cath Right Heart Cath;  Surgeon: Jeffrey Gibson MD;  Location:  HEART CARDIAC CATH LAB     CV RIGHT HEART CATH MEASUREMENTS RECORDED N/A 3/23/2021    Procedure: Heart Cath Right Heart Cath. request for 3/23;  Surgeon: Jeffrey Gibson MD;  Location:  HEART CARDIAC CATH LAB     GI SURGERY      Sylvester en Y     INSERT VENTRICULAR ASSIST DEVICE LEFT (HEARTMATE II) N/A 2017    Procedure: INSERT VENTRICULAR ASSIST DEVICE LEFT (HEARTMATE II);  Median Sternotomy Heartmate II Left Ventricular Assist Device Insertion on Pump Oxygenator;  Surgeon: Ronnie Quigley MD;  Location:  OR     ORTHOPEDIC SURGERY      right knee wired     PICC DOUBLE LUMEN PLACEMENT Right 2020    5FR PICC DL. Length-43cm (1cm out).     RELEASE CARPAL TUNNEL BILATERAL Bilateral 2021    Procedure: Bilateral carpal tunnel release;  Surgeon: Jermaine Brand MD;  Location:  OR      Allergies   Allergen Reactions     Beer Shortness Of Breath     Grass Shortness Of Breath     Ace Inhibitors Cough     Dust Mites Other (See Comments)     Asthma     Mold Other (See Comments)     Asthma     Penicillins Other (See Comments)     Unknown - childhood exposure    Tolerated Zosyn -2020     Sulfa Drugs Other (See Comments) and Unknown     Unknown childhood reaction      Social History     Tobacco Use     Smoking status: Former Smoker     Quit date:      Years since quittin.3     Smokeless tobacco: Never Used   Substance Use Topics     Alcohol use: No      Wt Readings from Last 1 Encounters:   21 102.3 kg (225 lb 8 oz)        Anesthesia Evaluation            ROS/MED HX  ENT/Pulmonary:     (+) sleep apnea, CECILIA risk factors, asthma COPD,     Neurologic:       Cardiovascular: Comment: S/p ICD Medtronic, Bivent, non-dependant.   S/p LVAD placement  Paroxysmal Afib   "  (+) hypertension-----CHF etiology: NICM dysrhythmias, Previous cardiac testing   Echo: Date: 01/08/2021 Results:  EF <30%, LV hypertrophic and enlarged. AoV closed, no AI. Mitral insufficiency. Per report \"RV poorly visualized\"/   Stress Test: Date: Results:    ECG Reviewed: Date: Results:    Cath: Date: Results:      METS/Exercise Tolerance:     Hematologic:       Musculoskeletal: Comment: BL carpal tunnel   Gout arthropathy      GI/Hepatic: Comment: S/p gastric bypass      Renal/Genitourinary:     (+) renal disease, type: CRI, Pt does not require dialysis,     Endo:     (+) type II DM, Using insulin, - not using insulin pump. Obesity,     Psychiatric/Substance Use:  - neg psychiatric ROS     Infectious Disease:  - neg infectious disease ROS     Malignancy:  - neg malignancy ROS     Other:  - neg other ROS          Physical Exam    Airway        Mallampati: I   TM distance: > 3 FB   Neck ROM: full   Mouth opening: > 3 cm    Respiratory Devices and Support         Dental  no notable dental history         Cardiovascular   cardiovascular exam normal    Comment: LVAD hum      Pulmonary   pulmonary exam normal                OUTSIDE LABS:  CBC:   Lab Results   Component Value Date    WBC 3.2 (L) 05/05/2021    WBC 3.5 (L) 05/05/2021    HGB 12.9 (L) 05/05/2021    HGB 13.2 (L) 05/05/2021    HCT 40.0 05/05/2021    HCT 40.8 05/05/2021     05/05/2021     05/05/2021     BMP:   Lab Results   Component Value Date     (L) 05/05/2021     05/04/2021    POTASSIUM 4.5 05/05/2021    POTASSIUM 4.6 05/05/2021    CHLORIDE 100 05/05/2021    CHLORIDE 101 05/04/2021    CO2 25 05/05/2021    CO2 26 05/04/2021    BUN 49 (H) 05/05/2021    BUN 50 (H) 05/04/2021    CR 1.59 (H) 05/05/2021    CR 1.60 (H) 05/04/2021     (H) 05/05/2021     (H) 05/04/2021     COAGS:   Lab Results   Component Value Date    PTT 37 05/04/2021    INR 1.49 (H) 05/05/2021    FIBR 401 06/20/2017     POC:   Lab Results   Component " Value Date     (H) 05/05/2021     HEPATIC:   Lab Results   Component Value Date    ALBUMIN 4.0 05/04/2021    PROTTOTAL 7.2 05/04/2021    ALT 40 05/04/2021    AST 47 (H) 05/04/2021    ALKPHOS 111 05/04/2021    BILITOTAL 0.6 05/04/2021     OTHER:   Lab Results   Component Value Date    PH 7.35 06/22/2017    LACT 1.1 08/05/2020    A1C 5.1 05/04/2021    QAMAR 9.4 05/05/2021    PHOS 3.8 05/04/2021    MAG 2.3 05/05/2021    LIPASE 168 08/05/2020    AMYLASE 93 05/04/2021    TSH 3.52 02/01/2021    CRP 3.5 10/27/2020    SED 10 10/27/2020       Anesthesia Plan    ASA Status:  4   NPO Status:  NPO Appropriate    Anesthesia Type: General.     - Airway: ETT   Induction: RSI.   Maintenance: Balanced.   Techniques and Equipment:     - Airway: Video-Laryngoscope     - Lines/Monitors: 2nd IV, Arterial Line, Central Line, PAC, CVP, BIS, NIRS, CHUN            CHUN Absolute Contra-indication: NONE            CHUN Relative Contra-indication: Remote UGI Surgery (s/p gastric bypass)     - Blood: PRBC, T&S, T&C, FFP     - Drips/Meds: Dexmed. infusion, Norepi, Epinephrine     Consents    Anesthesia Plan(s) and associated risks, benefits, and realistic alternatives discussed. Questions answered and patient/representative(s) expressed understanding.     - Discussed with:  Patient      - Extended Intubation/Ventilatory Support Discussed: Yes.      - Patient is DNR/DNI Status: No    Use of blood products discussed: Yes.     - Discussed with: Patient.     Postoperative Care    Pain management: IV analgesics.   PONV prophylaxis: Ondansetron (or other 5HT-3), Dexamethasone or Solumedrol     Comments:                Sera Abreu MD

## 2021-05-06 NOTE — PROGRESS NOTES
PATHOLOGY HLA CROSSMATCH CONSULTATION: DONOR/RECIPIENT  VIRTUAL CROSSMATCH - Heart  Consultation Date: May 4, 2021  Consultation Requested by: Dr. Ronnie Quigley    Regarding: Compatibility of  donor organ UNOS #[id number] from OPO: MWOB with Jim Willingham    Findings: Regarding a virtual crossmatch between Jim Willingham and  donor listed above (match ID [id number]):  The most recent (21) and three (3) additional patient serum/sera  were analyzed.  The patient has no antibodies listed with HLA specificity against the donor organ.      Record Review Indicates: I personally reviewed the most recent serum, the historic peak sera, and all other sera with solid-phase HLA Single Antigen test results:  The patient has no HLA antibodies against the donor organ.     The results of this virtual XM are:   -most recent serum: compatible   -peak #1: compatible  -peak #2: compatible    Disclaimer: Clinical judgement must take into account other factors, such as non-HLA antibodies not detected in the assay. The VXM gives probabilities only.  The probability does not account for the potential for auto-antibodies that may be present in the patient's serum.  These autoantibodies may render the physical crossmatch falsely positive, and would be detected by an autologous crossmatch.  When possible, confirm findings with prospective allogeneic and autologous flow crossmatches before going to transplant as clinically indicated.     Nba Go, PhD    Nba Go, PhD,[facility]  Medical Director, Immunology/Histocompatibility Laboratory  Pager [phone]

## 2021-05-06 NOTE — PROGRESS NOTES
Writer received update that 3C time is now 2100 with OR time at 2200. Abbie Kendall, LVAD coordinator, and Aidan Mclaughlin, [employer] Rep, updated on timeline.

## 2021-05-06 NOTE — PROVIDER NOTIFICATION
Potassium slowly trending up, last value 5.2. Potassium was replaced today in AM following the replacement protocol. Writer notified CVTS team.  Order for a fluid bolus to increase diuresis per CVTS. Will continue to monitor.

## 2021-05-06 NOTE — PLAN OF CARE
OT 4E: Cancel - OT/CR consult received. Pt now s/p OHT and not medically appropriate to initiate therapy. Will reschedule.

## 2021-05-06 NOTE — PHARMACY-TRANSPLANT NOTE
Adult Heart Transplant Post Operative Note    65 year old male s/p heart transplant on 5/6/21 for NICM. Planned immunosuppression regimen to include basiliximab (POD0 and POD4), mycophenolate, and steroids per protocol. Tacrolimus to be added as appropriate with goal of 10-12 mg/L. Opportunistic pathogen prophylaxis includes: dapsone, valganciclovir, and nystatin (starting POD5). Patient is not enrolled in medication study.    Patient with planned immunosuppression and prophylaxis as above. Pharmacy will monitor for medication interactions and immunosuppression levels in conjunction with the team. Medication therapy needs for discharge planning will continue to be addressed throughout the current admission via multidisciplinary rounds and order review. Pharmacy will make recommendations as appropriate.    Ana Zamudio, RonalD

## 2021-05-06 NOTE — PROGRESS NOTES
Transfer  Transferred to: PACU   Via: wheelchair  Reason for transfer: Pt going for heart transplant   Family: Aware of transfer; wife (Cate) with pt   Belongings: Remain on 6C - will send to 4E when surgery begins (wife took pt wallet home with her)  Chart:Sent with pt  Medications: Meds from bin sent with pt  Report called to: PACU RN       D: Admitted 2/08 for worsening functional status and renal function concerning for worsening heart failure. Listed status 2E on heart transplant list.   Hx: COPD, CKD3, DM2, NICM (EF 20%), VT/VF s/p CRT-D s/p LVAD HM2 2017 initially as destination therapy d/t obesity, but pt now eligible for transplant due to weight loss.     I: Monitored vitals and assessed pt status.     A: A0x4. VSS on RA. Tele shows SR with 1st degree AVB, rate 60-80. Occasional runs of slow VT, team aware. LVAD without alarms and numbers within parameters, dressing CDI. Pt reports pain in bilateral wrists and shoulders, acceptable level with current pain regimen. Q4h BG checks. Voiding adequately. LBM 5/5 per pt. Up independently.       P: Continue to monitor Pt status and report changes to Cards 2.     [id number]  Rajani Holman RN on 5/5/2021 at 9:38 PM

## 2021-05-06 NOTE — ANESTHESIA PROCEDURE NOTES
Central Line/PA Catheter Placement  Pre-Procedure   Staff -        Anesthesiologist:  Corey Tamez MD       Resident/Fellow: Yunior Betancourt DO       Other Anesthesia Staff: Sera Jurado MD       Performed By: with residents       Procedure performed by resident/CRNA in presence of a teaching physician.         Location: OR       Pre-Anesthestic Checklist: patient identified, IV checked, site marked, risks and benefits discussed, informed consent, monitors and equipment checked, pre-op evaluation and at physician/surgeon's request  Timeout:       Correct Patient: Yes        Correct Procedure: Yes        Correct Site: Yes        Correct Position: Yes        Correct Laterality: Yes     Procedure   Procedure: central line       Laterality: right       Insertion Site: internal jugular.       Patient Position: Trendelenburg  Sterile Prep        All elements of maximal sterile barrier technique followed       Patient Prep/Sterile Barriers: draped, hand hygiene, gloves , hat , mask , draped, gown, sterile gel and probe cover       Skin prep: Chloraprep  Insertion/Injection        Technique: ultrasound guided and Seldinger Technique        - Vein evaluated for patency/adequacy of catheter insertion is adequate, and using realtime U/S imaging the vein was punctured, and needle was observed entering vein on U/S       Introducer Type: 9 Fr, 2-lumen MAC   Narrative        blood aspirated from all lumens,        All lumens flushed: Yes       Verification method: Ultrasound and CHUN

## 2021-05-06 NOTE — CONSULTS
"Duran Mccarty M.D.  Cardiovascular Medicine    I personally saw and examined this patient, discussed care with housestaff and other consultants, reviewed current laboratories and imaging studies, and conveyed impression and diagnostic/therapeutic plan to patient.    Problem List  1. Cardiomyopathy   2. Cardiac transplantation day 1  3. Immunosuppression  4. Insulin resistance diabetes      Impression;    Stable post transplant status with minimal vasopressor need.  Immunosuppression reviewed and outlined    History  No events overnight. Nursing notes reviewed.    Objective  BP 93/75   Pulse 101   Temp 98.8  F (37.1  C) (Pulmonary Artery)   Resp 20   Ht 1.727 m (5' 8\")   Wt 102.3 kg (225 lb 8 oz)   SpO2 100%   BMI 34.29 kg/m   intubated sedated warm extremities     Intake/Output Summary (Last 24 hours) at 5/6/2021 1500  Last data filed at 5/6/2021 1500  Gross per 24 hour   Intake 6122.62 ml   Output 1948 ml   Net 4174.62 ml     Wt Readings from Last 5 Encounters:   05/05/21 102.3 kg (225 lb 8 oz)   04/27/21 102.5 kg (226 lb)   04/21/21 103 kg (227 lb)   04/14/21 101.2 kg (223 lb)   04/07/21 99.8 kg (220 lb)       Meds    [Held by provider] acarbose  25 mg Oral BID     acetaminophen  975 mg Oral Q8H     allopurinol  300 mg Oral Daily     amitriptyline  37.5 mg Oral At Bedtime     [Held by provider] aspirin  81 mg Oral or Feeding Tube Daily     buPROPion  100 mg Oral BID     ceFEPIme (MAXIPIME) IV  2 g Intravenous Q12H     [START ON 5/11/2021] dapsone  100 mg Oral Daily    Or     [START ON 5/11/2021] dapsone  100 mg Oral or NG Tube Daily     docusate  100 mg Oral BID     fluticasone-salmeterol  2 puff Inhalation Q12H     gabapentin  300 mg Oral Daily     gabapentin  600 mg Oral BID     ipratropium - albuterol 0.5 mg/2.5 mg/3 mL  3 mL Nebulization 4x daily     lidocaine  2 patch Transdermal Q24H     lidocaine   Transdermal Q8H     methylPREDNISolone  125 mg Intravenous Q8H     montelukast  10 mg Oral At Bedtime "     mycophenolate mofetil  1,500 mg Intravenous Q12H     [START ON 5/11/2021] nystatin  1,000,000 Units Oral 4x Daily     pantoprazole (PROTONIX) IV  40 mg Intravenous Daily     [START ON 5/7/2021] polyethylene glycol  17 g Oral Daily     senna-docusate  1 tablet Oral BID     sodium chloride (PF)  3 mL Intracatheter Q8H     [Held by provider] umeclidinium  1 puff Inhalation Daily     [START ON 5/11/2021] valGANciclovir  450 mg Oral Daily    Or     [START ON 5/11/2021] valGANciclovir  450 mg Oral or NG Tube Daily     vancomycin (VANCOCIN) IV  1,500 mg Intravenous Q18H       Labs  CMP  Recent Labs   Lab 05/06/21  1002 05/06/21  0650 05/06/21  0448 05/05/21  1118 05/05/21  0628 05/04/21  2313 05/03/21  0629 05/03/21  0629    139 138  --  132* 135  --  135   POTASSIUM 4.6 3.8 3.6 4.5 4.6 4.0   < > 3.9   CHLORIDE 106 105  --   --  100 101  --  102   CO2 24 21  --   --  25 26  --  26   ANIONGAP 9 13  --   --  7 7  --  7   * 162* 233*  --  197* 115*  --  89   BUN 50* 50*  --   --  49* 50*  --  56*   CR 1.83* 1.69*  --   --  1.59* 1.60*  --  1.81*   GFRESTIMATED 38* 42*  --   --  45* 45*  --  39*   GFRESTBLACK 44* 49*  --   --  52* 52*  --  45*   QAMAR 8.4* 9.3  --   --  9.4 9.1  --  8.8   MAG  --  3.1*  --  2.3 2.4* 2.3   < > 2.5*   PHOS  --  4.0  --   --   --  3.8  --   --    PROTTOTAL  --  4.6*  --   --   --  7.2  --  6.5*   ALBUMIN  --  2.7*  --   --   --  4.0  --  3.6   BILITOTAL  --  0.8  --   --   --  0.6  --  0.7   ALKPHOS  --  60  --   --   --  111  --  100   AST  --  Canceled, Test credited  --   --   --  47*  --  42   ALT  --  30  --   --   --  40  --  32    < > = values in this interval not displayed.     CBC  Recent Labs   Lab 05/06/21  1002 05/06/21  0650 05/06/21  0448 05/06/21  0426 05/05/21  0900   WBC 20.0* 22.9*  --  22.6* 3.2*   RBC 3.01* 3.43*  --  3.13* 4.31*   HGB 9.3* 10.3* 6.8* 9.3* 12.9*   HCT 28.3* 31.8*  --  29.0* 40.0   MCV 94 93  --  93 93   MCH 30.9 30.0  --  29.7 29.9   MCHC 32.9  32.4  --  32.1 32.3   RDW 15.3* 15.0  --  14.8 14.5    210  --  125* 210     INR  Recent Labs   Lab 05/06/21  0650 05/06/21  0426 05/05/21  1503 05/05/21  0628   INR 1.45* 2.02* 1.49* 1.57*     Arterial Blood Gas  Recent Labs   Lab 05/06/21  0919 05/06/21  0650 05/06/21  0448   PH 7.32* 7.29* 7.29*   PCO2 45 44 48*   PO2 190* 262* 449*   HCO3 23 21 23   O2PER 50 100.0  100.0 100.0

## 2021-05-06 NOTE — PROGRESS NOTES
Pt received heart transplant on 05/06/2021, donor ID [id number], removed from [facility] transplant waitlist.

## 2021-05-06 NOTE — PLAN OF CARE
Deactivated ventricular detection for ICD therapies. Patient not pacemaker dependent.    Jason Lund MD  Cardiovascular disease fellow  [facility]  [phone]  05/05/2021 10:12 PM

## 2021-05-06 NOTE — ANESTHESIA PROCEDURE NOTES
Central Line/PA Catheter Placement  Pre-Procedure   Staff -        Anesthesiologist:  Corey Tamez MD       Resident/Fellow: Yunior Betancourt DO       Other Anesthesia Staff: Sera Jurado MD       Performed By: with residents       Procedure performed by resident/CRNA in presence of a teaching physician.         Pre-Anesthestic Checklist: patient identified, IV checked, site marked, risks and benefits discussed, informed consent, monitors and equipment checked, pre-op evaluation and at physician/surgeon's request  Timeout:       Correct Patient: Yes        Correct Procedure: Yes        Correct Site: Yes        Correct Position: Yes        Correct Laterality: Yes     Procedure   Procedure: central line       Laterality: right       Insertion Site: internal jugular.       Patient Position: Trendelenburg  Sterile Prep        All elements of maximal sterile barrier technique followed       Patient Prep/Sterile Barriers: draped, hand hygiene, gloves , hat , mask , draped, gown, sterile gel and probe cover  Insertion/Injection        Technique: ultrasound guided and Seldinger Technique        - Vein evaluated for patency/adequacy of catheter insertion is adequate, and using realtime U/S imaging the vein was punctured, and needle was observed entering vein on U/S       Introducer Type: 9 Fr, 2-lumen MAC        PA Catheter Type: CCO         Appropriate RV, RA and PA waveforms noted:  Yes            Withdrawn and Locked at cm: 50  Narrative         Secured by: suture and other       Tegaderm and Biopatch dressing used.       Complications: None apparent,        blood aspirated from all lumens,        All lumens flushed: Yes       Verification method: CHUN

## 2021-05-06 NOTE — ANESTHESIA PROCEDURE NOTES
Perioperative CHUN Procedure Note    Staff -        Anesthesiologist:  Corey Tamez MD       Other Anesthesia Staff: Yunior Betancourt DO       Procedure performed by resident/fellow/CRNA in presence of a teaching physician.    Preanesthesia Checklist:  Patient identified, IV assessed, risks and benefits discussed, monitors and equipment assessed, procedure being performed at surgeon's request and anesthesia consent obtained.      CHUN Report  General Procedure Information  Fellow/Resident Interpretation: I personally reviewed the images and the fellow/resident interpretation.  I agree with the fellow/resident interpretation as amended by myself.     Modalities: 2D, 3D, CW Doppler, Color flow mapping and PW Doppler  Diagnostic indications for CHUN:   Heart Transplant  Echocardiographic and Doppler Measurements  Right Ventricle:  Cavity size normal.   Hypertrophy not present.   Thrombus not present.    Global function normal.     Left Ventricle:  Cavity size normal.   Hypertrophy not present.   Thrombus not present.   Global Function normal.       Ventricular Regional Function:  1- Basal Anteroseptal:  normal  2- Basal Anterior:  normal  3- Basal Anterolateral:  normal  4- Basal Inferolateral:  normal  5- Basal Inferior:  normal  6- Basal Inferoseptal:  normal  7- Mid Anteroseptal:  normal  8- Mid Anterior:  normal  9- Mid Anterolateral:  normal  10- Mid Inferolateral:  normal  11- Mid Inferior:  normal  12- Mid Inferoseptal:  normal  13- Apical Anterior:  normal  14- Apical Lateral:  normal  15- Apical Inferior:  normal  16- Apical Septal:  normal  17- [place]:  normal    Valves  Aortic Valve: Annulus normal.  Stenosis not present.  Regurgitation absent.  Leaflets normal.  Leaflet motions normal.    Mitral Valve: Annulus normal.  Stenosis not present.  Regurgitation absent.  Leaflets normal.  Leaflet motions normal.    Tricuspid Valve: Annulus normal.  Stenosis not present.  Regurgitation absent.  Leaflets  normal.  Leaflet motions normal.    Pulmonic Valve: Annulus normal.  Stenosis not present.  Regurgitation absent.      Aorta: Ascending Aorta: Size normal.  Dissection not present.  Plaque thickness less than 3 mm.  Mobile plaque not present.    Aortic Arch: Size normal.   Dissection not present.   Plaque thickness less than 3 mm.   Mobile plaque not present.    Descending Aorta: Size normal.   Dissection not present.   Plaque thickness less than 3 mm.   Mobile plaque not present.        Right Atrium:  Size normal.   Spontaneous echo contrast not present.   Thrombus not present.   Tumor not present.   Device not present.     Left Atrium: Size normal.  Spontaneous echo contrast not present.  Thrombus not present.  Tumor not present.  Device not present.    Left atrial appendage normal.     Atrial Septum: Intra-atrial septal morphology normal.     Ventricular Septum: Intra-ventricular septum morphology normal.       Other Findings:   Pericardium:  normal. Pleural Effusion:  none. Pulmonary Arteries:  normal.   Post Intervention Findings  Procedure(s) performed:  Heart or Lung Transplant. Global function:  Improved. Regional wall motion: Improved. Surgeon(s) notified of all postintervention findings: Yes. Heart Transplant:  Transplant heart function normal.       Echocardiogram Comments  Echocardiogram comments: S/p heart transplant normal biventricular function on epinephrine 0.1 mcg/kg/min norepinephrine 0.1 mcg/kg/min. No valvular lesions. No pericardial effusion. No aortic dissection..

## 2021-05-06 NOTE — PROGRESS NOTES
CVICU PROGRESS NOTE  5/7/2021      Date of Service (when I saw the patient): 5/7/2021    ASSESSMENT: Jim Willingham is a 65 year old male with pmhx of T2DM, CKD, COPD, CECILIA (cpap), history of NICM s/p Hm2 LVAD, afib who is now s/p heart transplant with Dr. Quigley on 5/6/2021. Intraoperatively, he received 1uPRBC, 2 Plt, 2 fibryga, 2,000 Kcentra. Ischemic time was 157 min. Cross clamp time was 98 min. Coming off bypass, he was shocked > 2x received amio, mag and lidocaine.     CHANGES FOR TODAY:  Wean inhaled epoprostenol to off  Resume PTA aspirin  Start subQ heparin  SBT, possible extubation     PLAN:  Neuro:  # Post-operative pain and sedation  - Fentanyl for pain, propofol for sedation  - PTA gabapentin 300mg daily, 600mg BID  - Hydromorphone, oxycodone available prn  - Lidocaine patches, scheduled acetaminophen    # Depression  - Continue PTA bupropion, amitriptyline     Pulmonary:  # COPD  # CECILIA uses cpap   # Post op ventilatory support   - Mechanical ventilation AC 20/450/+5/50% --> 7.38/41/152/24  - SBT, extubate if able this afternoon  - Wean FiO2 to maintain SpO2 >92%  - PTA Trelegy ellipta on hold  - Advair HFA, duonebs  - Continue PTA montelukast     Cardiovascular:   # History of NICM s/p HM2 LVAD now s/p heart transplant with Dr. Quigley on 5/6/21   # Cardiogenic shock  - Epinephrine and norepinephrine to maintain MAP >65  - Chronotropy: isoproterenol  - Inhaled epoprostenol to offload the RV, wean to off  - Arterial line for BP monitoring  - Resume PTA ASA  - Temporary pacemaker, backup rate 100  - Immunosuppression per cardiology: Basiliximab dosed 5/4, methylprednisolone 125 mg q8 hours completed, MMF 1500 mg BID  - Infections ppx: Valganciclovir and Dapsone to begin 5/11, nystatin QID,      GI/Nutrition:   # Nutrition  - NG in place, hold feeds given hope of extubating today  - Nutrition consult for TF, recommend Osmolite 1.5 goal rate 65 mL/hour   - Bowel regimen: scheduled senna, docusate, Miralax  -  Formal swallow study when extubated     Fluids/Electrolytes/Renal:  # Lactic acidosis, improved  # WALTER on CKD3, baseline Cr 1.5-1.7  - Christopher in place for strict I/O monitoring  - Reevaluate at noon for diuresis, target net even today     Endocrine:  # T2DM , stress hyperglycemia  # Hx of gout   - Insulin gtt (76 units yesterday)  - Continue PTA allopurinol  - Holding PTA acarbose     ID:  # Perioperative antibiotics  - Complete perioperative antibiotics (vancomycin and cefepime x5 days)    # Leukocytosis, improving  WBC 20 yesterday --> 13.8 today     Heme:  # Acute blood loss anemia   - Hgb goal >7  - No sign of bleeding     Prophylaxis:    - Mechanical prophylaxis for DVT, subQ heparin   - Protonix daily     Lines/ tubes/ drains:  - MAC  - [place]  - arterial line  - christopher     Disposition:  - CV ICU    Patient seen, findings and plan discussed with surgical ICU staff, Dr. Rodriguez.    Time spent on this Encounter   Billing: I spent 35 minutes bedside and on the inpatient unit today managing the critical care of Jim Willingham in relation to the issues listed in this note.    Shaina Hagen, ACNP  ====================================  INTERVAL HISTORY:   Following commands for RN overnight. Epi weaned. Weak cough.     OBJECTIVE:   1. VITAL SIGNS:   Temp:  [97.2  F (36.2  C)-99.2  F (37.3  C)] 98.8  F (37.1  C)  Pulse:  [] 101  Resp:  [18-20] 20  BP: ()/(60-86) 93/75  MAP:  [59 mmHg-89 mmHg] 69 mmHg  Arterial Line BP: ()/(6-71) 95/58  FiO2 (%):  [50 %-100 %] 50 %  SpO2:  [96 %-100 %] 100 %  Ventilation Mode: CMV/AC  (Continuous Mandatory Ventilation/ Assist Control)  FiO2 (%): 50 %  Rate Set (breaths/minute): 20 breaths/min  Tidal Volume Set (mL): 450 mL  PEEP (cm H2O): 5 cmH2O  Oxygen Concentration (%): 50 %  Resp: 20    2. INTAKE/ OUTPUT:   I/O last 3 completed shifts:  In: 3745 [P.O.:60; I.V.:2087; Other:800]  Out: 2250 [Urine:2250]    3. PHYSICAL EXAMINATION:  General: Sedated  HEENT: Mucous  membranes moist  Neuro: Follows commands in all extremities  Pulm/Resp: Clear breath sounds bilaterally without rhonchi, crackles or wheeze, breathing non-labored  CV: RRR, S1/S2 without m/r/g; pedal pulses 2+ with trace LE edema  Abdomen: Soft, non-distended, non-tender  : Russell catheter in place, urine yellow and clear  Incisions/Skin: Midline incision covered with wound vac    4. INVESTIGATIONS:   Arterial Blood Gases   Recent Labs   Lab 05/06/21 0919 05/06/21  0650 05/06/21  0448   PH 7.32* 7.29* 7.29*   PCO2 45 44 48*   PO2 190* 262* 449*   HCO3 23 21 23     Complete Blood Count   Recent Labs   Lab 05/06/21 1002 05/06/21 0650 05/06/21  0448 05/06/21  0426 05/05/21  0900   WBC 20.0* 22.9*  --  22.6* 3.2*   HGB 9.3* 10.3* 6.8* 9.3* 12.9*    210  --  125* 210     Basic Metabolic Panel  Recent Labs   Lab 05/06/21  1002 05/06/21  0650 05/06/21  0448 05/05/21  1118 05/05/21  0628 05/04/21  2313    139 138  --  132* 135   POTASSIUM 4.6 3.8 3.6 4.5 4.6 4.0   CHLORIDE 106 105  --   --  100 101   CO2 24 21  --   --  25 26   BUN 50* 50*  --   --  49* 50*   CR 1.83* 1.69*  --   --  1.59* 1.60*   * 162* 233*  --  197* 115*     Liver Function Tests  Recent Labs   Lab 05/06/21  0650 05/06/21  0426 05/05/21  1503 05/05/21  0628 05/04/21  2313 05/03/21  0629   AST Canceled, Test credited  --   --   --  47* 42   ALT 30  --   --   --  40 32   ALKPHOS 60  --   --   --  111 100   BILITOTAL 0.8  --   --   --  0.6 0.7   ALBUMIN 2.7*  --   --   --  4.0 3.6   INR 1.45* 2.02* 1.49* 1.57* 2.00* 2.31*     Pancreatic Enzymes  Recent Labs   Lab 05/04/21  2313   AMYLASE 93     Coagulation Profile  Recent Labs   Lab 05/06/21  0650 05/06/21  0426 05/05/21  1503 05/05/21  0628 05/04/21  2313   INR 1.45* 2.02* 1.49* 1.57* 2.00*   PTT 35 26  --   --  37       5. RADIOLOGY:   Recent Results (from the past 24 hour(s))   XR Chest Port 1 View    Narrative    EXAM: XR CHEST PORT 1 VIEW  5/6/2021 7:54 AM     HISTORY:  arrival  to icu      Additional information from the EMR: Status post heart transplant from  6/20/2021.    COMPARISON:  Radiograph 5/4/2021.    TECHNIQUE: Single frontal radiograph of the chest.    FINDINGS:   The endotracheal tube terminates at the mid thoracic trachea. Right IJ  Combes-Aashish catheter. Retained pacemaker lead extending from the left  subclavicular area to the high SVC. Lateral pleural chest tubes and  mediastinal drains are seen. No appreciable pneumothorax or  pneumomediastinum. Postsurgical changes of heart transplant with  intact sternotomy wires. Aortic arch atherosclerosis.    Midline trachea. Cardiac size appears normal. Pulmonary vasculature is  distinct. Indeterminate new circular lucency at the right lateral  lower lung. Perihilar and bibasilar atelectasis, left greater than  right. Trace bilateral pleural effusions.      Impression    IMPRESSION:   1. Postsurgical changes of heart transplant.  2. Mild pulmonary edema.  3. Perihilar and bibasilar atelectasis. Indeterminate round lucency in  the lateral right lower lobe. Attention on follow-up.     I have personally reviewed the examination and initial interpretation  and I agree with the findings.    PADILLA MCCORMICK MD   XR Abdomen Port 1 View    Narrative    Exam: XR ABDOMEN PORT 1 VIEWS, 5/6/2021 7:54 AM    Indication: NG placement    Comparison: CT chest abdomen pelvis 6/21/    Findings:   Portable AP supine abdominal radiograph. Multiple overlying lines and  tubes and postsurgical changes in the thorax. Please see chest  radiograph same date and time. Nasogastric tube can be visualized  extending inferiorly along the right side of the mediastinum. This can  be followed in the epigastrium with the tip likely to the left of the  upper lumbar spine. There is an overlying thin caliber tube extending  to the left which appears to be overlying this region, possibly  external to the patient rather than part of the NG tube. Side hole  difficult to  visualize, likely in the region of the distal esophagus  above the GE junction. Nonobstructive bowel gas pattern. Degenerative  changes.      Impression    Impression: Multiple drains and catheters overlying the epigastrium  extending into the thorax with extensive postsurgical changes.  Nasogastric tube is difficult to clearly follow through this region  but tip likely overlying the proximal stomach with sidehole in the  region of the distal esophagus. Suggest slight advancement with repeat  radiograph. Additional catheter looping in the left abdomen appears to  be separate from the nasogastric tube but this could be reassessed on  follow-up.    JEROME CUMMINGS MD       =========================================

## 2021-05-07 ENCOUNTER — APPOINTMENT (OUTPATIENT)
Dept: GENERAL RADIOLOGY | Facility: CLINIC | Age: 66
DRG: 001 | End: 2021-05-07
Attending: INTERNAL MEDICINE
Payer: COMMERCIAL

## 2021-05-07 LAB
ALBUMIN SERPL-MCNC: 2.8 G/DL (ref 3.4–5)
ALBUMIN SERPL-MCNC: 2.9 G/DL (ref 3.4–5)
ALBUMIN SERPL-MCNC: 3 G/DL (ref 3.4–5)
ALBUMIN SERPL-MCNC: 3.1 G/DL (ref 3.4–5)
ALBUMIN UR-MCNC: NEGATIVE MG/DL
ALP SERPL-CCNC: 44 U/L (ref 40–150)
ALP SERPL-CCNC: 45 U/L (ref 40–150)
ALP SERPL-CCNC: 46 U/L (ref 40–150)
ALP SERPL-CCNC: 46 U/L (ref 40–150)
ALT SERPL W P-5'-P-CCNC: 31 U/L (ref 0–70)
ALT SERPL W P-5'-P-CCNC: 31 U/L (ref 0–70)
ALT SERPL W P-5'-P-CCNC: 32 U/L (ref 0–70)
ALT SERPL W P-5'-P-CCNC: 32 U/L (ref 0–70)
ANION GAP SERPL CALCULATED.3IONS-SCNC: 10 MMOL/L (ref 3–14)
ANION GAP SERPL CALCULATED.3IONS-SCNC: 8 MMOL/L (ref 3–14)
ANION GAP SERPL CALCULATED.3IONS-SCNC: 8 MMOL/L (ref 3–14)
ANION GAP SERPL CALCULATED.3IONS-SCNC: 9 MMOL/L (ref 3–14)
APPEARANCE UR: CLEAR
AST SERPL W P-5'-P-CCNC: 65 U/L (ref 0–45)
AST SERPL W P-5'-P-CCNC: 71 U/L (ref 0–45)
AST SERPL W P-5'-P-CCNC: 81 U/L (ref 0–45)
AST SERPL W P-5'-P-CCNC: 97 U/L (ref 0–45)
BASE DEFICIT BLDA-SCNC: 1.1 MMOL/L
BASE DEFICIT BLDA-SCNC: 1.3 MMOL/L
BASE DEFICIT BLDV-SCNC: 3.5 MMOL/L
BASE DEFICIT BLDV-SCNC: 4.4 MMOL/L
BASE DEFICIT BLDV-SCNC: 5 MMOL/L
BASE DEFICIT BLDV-SCNC: NORMAL MMOL/L
BASE EXCESS BLDV CALC-SCNC: 0.1 MMOL/L
BASE EXCESS BLDV CALC-SCNC: 0.2 MMOL/L
BASE EXCESS BLDV CALC-SCNC: NORMAL MMOL/L
BASOPHILS # BLD AUTO: 0 10E9/L (ref 0–0.2)
BASOPHILS NFR BLD AUTO: 0.1 %
BILIRUB DIRECT SERPL-MCNC: 0.3 MG/DL (ref 0–0.2)
BILIRUB SERPL-MCNC: 0.6 MG/DL (ref 0.2–1.3)
BILIRUB SERPL-MCNC: 0.6 MG/DL (ref 0.2–1.3)
BILIRUB SERPL-MCNC: 0.7 MG/DL (ref 0.2–1.3)
BILIRUB SERPL-MCNC: 0.9 MG/DL (ref 0.2–1.3)
BILIRUB UR QL STRIP: NEGATIVE
BUN SERPL-MCNC: 68 MG/DL (ref 7–30)
BUN SERPL-MCNC: 73 MG/DL (ref 7–30)
BUN SERPL-MCNC: 74 MG/DL (ref 7–30)
BUN SERPL-MCNC: 75 MG/DL (ref 7–30)
CALCIUM SERPL-MCNC: 7.7 MG/DL (ref 8.5–10.1)
CALCIUM SERPL-MCNC: 8 MG/DL (ref 8.5–10.1)
CALCIUM SERPL-MCNC: 8.1 MG/DL (ref 8.5–10.1)
CALCIUM SERPL-MCNC: 8.5 MG/DL (ref 8.5–10.1)
CELL TYPE ALLO: NORMAL
CELL TYPE AUTO: NORMAL
CHANNELSHIFTALLOB1: -108
CHANNELSHIFTALLOB2: -111
CHANNELSHIFTALLOT1: -97
CHANNELSHIFTALLOT2: -98
CHANNELSHIFTAUTOB1: -39
CHANNELSHIFTAUTOB2: -49
CHANNELSHIFTAUTOT1: -33
CHANNELSHIFTAUTOT2: -42
CHLORIDE SERPL-SCNC: 104 MMOL/L (ref 94–109)
CHLORIDE SERPL-SCNC: 104 MMOL/L (ref 94–109)
CHLORIDE SERPL-SCNC: 105 MMOL/L (ref 94–109)
CHLORIDE SERPL-SCNC: 106 MMOL/L (ref 94–109)
CO2 SERPL-SCNC: 23 MMOL/L (ref 20–32)
CO2 SERPL-SCNC: 23 MMOL/L (ref 20–32)
CO2 SERPL-SCNC: 24 MMOL/L (ref 20–32)
CO2 SERPL-SCNC: 25 MMOL/L (ref 20–32)
COLOR UR AUTO: YELLOW
COMMENT ALLOB2: NORMAL
CREAT SERPL-MCNC: 2.44 MG/DL (ref 0.66–1.25)
CREAT SERPL-MCNC: 2.56 MG/DL (ref 0.66–1.25)
CREAT SERPL-MCNC: 2.64 MG/DL (ref 0.66–1.25)
CREAT SERPL-MCNC: 2.66 MG/DL (ref 0.66–1.25)
CREAT UR-MCNC: 91 MG/DL
CROSSMATCHDATEALLO: NORMAL
CROSSMATCHDATEAUTO: NORMAL
DIFFERENTIAL METHOD BLD: ABNORMAL
DONOR ALLO: NORMAL
DONOR AUTO: NORMAL
DONORCELLDATE ALLO: NORMAL
DONORCELLDATE AUTO: NORMAL
EOSINOPHIL # BLD AUTO: 0 10E9/L (ref 0–0.7)
EOSINOPHIL NFR BLD AUTO: 0 %
ERYTHROCYTE [DISTWIDTH] IN BLOOD BY AUTOMATED COUNT: 15.6 % (ref 10–15)
ERYTHROCYTE [DISTWIDTH] IN BLOOD BY AUTOMATED COUNT: 15.7 % (ref 10–15)
FRACT EXCRET NA UR+SERPL-RTO: <0.1 %
GFR SERPL CREATININE-BSD FRML MDRD: 24 ML/MIN/{1.73_M2}
GFR SERPL CREATININE-BSD FRML MDRD: 25 ML/MIN/{1.73_M2}
GFR SERPL CREATININE-BSD FRML MDRD: 25 ML/MIN/{1.73_M2}
GFR SERPL CREATININE-BSD FRML MDRD: 27 ML/MIN/{1.73_M2}
GLUCOSE BLDC GLUCOMTR-MCNC: 109 MG/DL (ref 70–99)
GLUCOSE BLDC GLUCOMTR-MCNC: 110 MG/DL (ref 70–99)
GLUCOSE BLDC GLUCOMTR-MCNC: 116 MG/DL (ref 70–99)
GLUCOSE BLDC GLUCOMTR-MCNC: 118 MG/DL (ref 70–99)
GLUCOSE BLDC GLUCOMTR-MCNC: 118 MG/DL (ref 70–99)
GLUCOSE BLDC GLUCOMTR-MCNC: 119 MG/DL (ref 70–99)
GLUCOSE BLDC GLUCOMTR-MCNC: 126 MG/DL (ref 70–99)
GLUCOSE BLDC GLUCOMTR-MCNC: 129 MG/DL (ref 70–99)
GLUCOSE BLDC GLUCOMTR-MCNC: 152 MG/DL (ref 70–99)
GLUCOSE BLDC GLUCOMTR-MCNC: 156 MG/DL (ref 70–99)
GLUCOSE BLDC GLUCOMTR-MCNC: 158 MG/DL (ref 70–99)
GLUCOSE BLDC GLUCOMTR-MCNC: 93 MG/DL (ref 70–99)
GLUCOSE BLDC GLUCOMTR-MCNC: 95 MG/DL (ref 70–99)
GLUCOSE SERPL-MCNC: 100 MG/DL (ref 70–99)
GLUCOSE SERPL-MCNC: 118 MG/DL (ref 70–99)
GLUCOSE SERPL-MCNC: 153 MG/DL (ref 70–99)
GLUCOSE SERPL-MCNC: 161 MG/DL (ref 70–99)
GLUCOSE UR STRIP-MCNC: NEGATIVE MG/DL
HBV DNA SERPL QL NAA+PROBE: NORMAL
HCO3 BLD-SCNC: 23 MMOL/L (ref 21–28)
HCO3 BLD-SCNC: 24 MMOL/L (ref 21–28)
HCO3 BLDV-SCNC: 20 MMOL/L (ref 21–28)
HCO3 BLDV-SCNC: 21 MMOL/L (ref 21–28)
HCO3 BLDV-SCNC: 22 MMOL/L (ref 21–28)
HCO3 BLDV-SCNC: 26 MMOL/L (ref 21–28)
HCO3 BLDV-SCNC: 26 MMOL/L (ref 21–28)
HCO3 BLDV-SCNC: NORMAL MMOL/L (ref 21–28)
HCT VFR BLD AUTO: 22.3 % (ref 40–53)
HCT VFR BLD AUTO: 23.1 % (ref 40–53)
HCT VFR BLD AUTO: 24.6 % (ref 40–53)
HCT VFR BLD AUTO: 25 % (ref 40–53)
HCV RNA SERPL QL NAA+PROBE: NORMAL
HGB BLD-MCNC: 6.9 G/DL (ref 13.3–17.7)
HGB BLD-MCNC: 7.4 G/DL (ref 13.3–17.7)
HGB BLD-MCNC: 8 G/DL (ref 13.3–17.7)
HGB BLD-MCNC: 8 G/DL (ref 13.3–17.7)
HGB UR QL STRIP: NEGATIVE
HIV1+2 RNA SERPL QL NAA+PROBE: NORMAL
HYALINE CASTS #/AREA URNS LPF: 3 /LPF (ref 0–2)
IMM GRANULOCYTES # BLD: 0.1 10E9/L (ref 0–0.4)
IMM GRANULOCYTES NFR BLD: 0.7 %
INR PPP: 1.38 (ref 0.86–1.14)
KETONES UR STRIP-MCNC: NEGATIVE MG/DL
LACTATE BLD-SCNC: 0.8 MMOL/L (ref 0.7–2)
LACTATE BLD-SCNC: 1.1 MMOL/L (ref 0.7–2)
LACTATE BLD-SCNC: 1.5 MMOL/L (ref 0.7–2)
LACTATE BLD-SCNC: 1.6 MMOL/L (ref 0.7–2)
LEUKOCYTE ESTERASE UR QL STRIP: NEGATIVE
LYMPHOCYTES # BLD AUTO: 0.7 10E9/L (ref 0.8–5.3)
LYMPHOCYTES NFR BLD AUTO: 4.7 %
MAGNESIUM SERPL-MCNC: 3 MG/DL (ref 1.6–2.3)
MCH RBC QN AUTO: 29.5 PG (ref 26.5–33)
MCH RBC QN AUTO: 29.9 PG (ref 26.5–33)
MCH RBC QN AUTO: 30.2 PG (ref 26.5–33)
MCH RBC QN AUTO: 31.3 PG (ref 26.5–33)
MCHC RBC AUTO-ENTMCNC: 30.9 G/DL (ref 31.5–36.5)
MCHC RBC AUTO-ENTMCNC: 32 G/DL (ref 31.5–36.5)
MCHC RBC AUTO-ENTMCNC: 32 G/DL (ref 31.5–36.5)
MCHC RBC AUTO-ENTMCNC: 32.5 G/DL (ref 31.5–36.5)
MCV RBC AUTO: 93 FL (ref 78–100)
MCV RBC AUTO: 94 FL (ref 78–100)
MCV RBC AUTO: 95 FL (ref 78–100)
MCV RBC AUTO: 96 FL (ref 78–100)
MONOCYTES # BLD AUTO: 0.7 10E9/L (ref 0–1.3)
MONOCYTES NFR BLD AUTO: 5.1 %
MUCOUS THREADS #/AREA URNS LPF: PRESENT /LPF
NEUTROPHILS # BLD AUTO: 12.4 10E9/L (ref 1.6–8.3)
NEUTROPHILS NFR BLD AUTO: 89.4 %
NITRATE UR QL: NEGATIVE
O2/TOTAL GAS SETTING VFR VENT: 40 %
O2/TOTAL GAS SETTING VFR VENT: NORMAL %
OXYHGB MFR BLD: 96 % (ref 92–100)
OXYHGB MFR BLD: 97 % (ref 92–100)
OXYHGB MFR BLD: 98 % (ref 92–100)
OXYHGB MFR BLD: 98 % (ref 92–100)
OXYHGB MFR BLDV: 33 %
OXYHGB MFR BLDV: 49 %
OXYHGB MFR BLDV: 49 %
OXYHGB MFR BLDV: 58 %
OXYHGB MFR BLDV: 61 %
OXYHGB MFR BLDV: NORMAL %
PCO2 BLD: 38 MM HG (ref 35–45)
PCO2 BLD: 40 MM HG (ref 35–45)
PCO2 BLD: 41 MM HG (ref 35–45)
PCO2 BLD: 41 MM HG (ref 35–45)
PCO2 BLDV: 38 MM HG (ref 40–50)
PCO2 BLDV: 40 MM HG (ref 40–50)
PCO2 BLDV: 43 MM HG (ref 40–50)
PCO2 BLDV: 47 MM HG (ref 40–50)
PCO2 BLDV: 49 MM HG (ref 40–50)
PCO2 BLDV: NORMAL MM HG (ref 40–50)
PH BLD: 7.38 PH (ref 7.35–7.45)
PH BLD: 7.38 PH (ref 7.35–7.45)
PH BLD: 7.39 PH (ref 7.35–7.45)
PH BLD: 7.4 PH (ref 7.35–7.45)
PH BLDV: 7.32 PH (ref 7.32–7.43)
PH BLDV: 7.33 PH (ref 7.32–7.43)
PH BLDV: 7.34 PH (ref 7.32–7.43)
PH BLDV: 7.34 PH (ref 7.32–7.43)
PH BLDV: 7.35 PH (ref 7.32–7.43)
PH BLDV: NORMAL PH (ref 7.32–7.43)
PH UR STRIP: 5 PH (ref 5–7)
PLATELET # BLD AUTO: 144 10E9/L (ref 150–450)
PLATELET # BLD AUTO: 150 10E9/L (ref 150–450)
PLATELET # BLD AUTO: 152 10E9/L (ref 150–450)
PLATELET # BLD AUTO: 162 10E9/L (ref 150–450)
PO2 BLD: 101 MM HG (ref 80–105)
PO2 BLD: 102 MM HG (ref 80–105)
PO2 BLD: 130 MM HG (ref 80–105)
PO2 BLD: 153 MM HG (ref 80–105)
PO2 BLDV: 25 MM HG (ref 25–47)
PO2 BLDV: 30 MM HG (ref 25–47)
PO2 BLDV: 30 MM HG (ref 25–47)
PO2 BLDV: 33 MM HG (ref 25–47)
PO2 BLDV: 34 MM HG (ref 25–47)
PO2 BLDV: NORMAL MM HG (ref 25–47)
POS CUT OFF ALLO B: >93
POS CUT OFF ALLO T: >79
POS CUT OFF AUTO B: >93
POS CUT OFF AUTO T: >79
POTASSIUM SERPL-SCNC: 4.1 MMOL/L (ref 3.4–5.3)
POTASSIUM SERPL-SCNC: 4.2 MMOL/L (ref 3.4–5.3)
POTASSIUM SERPL-SCNC: 4.7 MMOL/L (ref 3.4–5.3)
POTASSIUM SERPL-SCNC: 4.8 MMOL/L (ref 3.4–5.3)
PROT SERPL-MCNC: 4.8 G/DL (ref 6.8–8.8)
PROT SERPL-MCNC: 5 G/DL (ref 6.8–8.8)
PROT SERPL-MCNC: 5.2 G/DL (ref 6.8–8.8)
PROT SERPL-MCNC: 5.2 G/DL (ref 6.8–8.8)
RBC # BLD AUTO: 2.34 10E12/L (ref 4.4–5.9)
RBC # BLD AUTO: 2.45 10E12/L (ref 4.4–5.9)
RBC # BLD AUTO: 2.56 10E12/L (ref 4.4–5.9)
RBC # BLD AUTO: 2.68 10E12/L (ref 4.4–5.9)
RBC #/AREA URNS AUTO: 2 /HPF (ref 0–2)
RESULT ALLO B1: NORMAL
RESULT ALLO B2: NORMAL
RESULT ALLO T1: NORMAL
RESULT ALLO T2: NORMAL
RESULT AUTO B1: NORMAL
RESULT AUTO B2: NORMAL
RESULT AUTO T1: NORMAL
RESULT AUTO T2: NORMAL
SA1 CELL: NORMAL
SA1 COMMENTS: NORMAL
SA1 HI RISK ABY: NORMAL
SA1 MOD RISK ABY: NORMAL
SA1 TEST METHOD: NORMAL
SA2 CELL: NORMAL
SA2 COMMENTS: NORMAL
SA2 HI RISK ABY UA: NORMAL
SA2 MOD RISK ABY: NORMAL
SA2 TEST METHOD: NORMAL
SERUM DATE ALLO B1: NORMAL
SERUM DATE ALLO B2: NORMAL
SERUM DATE ALLO T1: NORMAL
SERUM DATE ALLO T2: NORMAL
SERUM DATE AUTO B1: NORMAL
SERUM DATE AUTO B2: NORMAL
SERUM DATE AUTO T1: NORMAL
SERUM DATE AUTO T2: NORMAL
SODIUM SERPL-SCNC: 136 MMOL/L (ref 133–144)
SODIUM SERPL-SCNC: 137 MMOL/L (ref 133–144)
SODIUM SERPL-SCNC: 138 MMOL/L (ref 133–144)
SODIUM SERPL-SCNC: 138 MMOL/L (ref 133–144)
SODIUM UR-SCNC: <5 MMOL/L
SOURCE: ABNORMAL
SP GR UR STRIP: 1.02 (ref 1–1.03)
SQUAMOUS #/AREA URNS AUTO: 0 /HPF (ref 0–1)
TESTMETHODALLO: NORMAL
TESTMETHODAUTO: NORMAL
TRANS CELLS #/AREA URNS HPF: 1 /HPF (ref 0–1)
TREATMENT ALLO B1: NORMAL
TREATMENT ALLO B2: NORMAL
TREATMENT ALLO T1: NORMAL
TREATMENT ALLO T2: NORMAL
TREATMENT AUTO B1: NORMAL
TREATMENT AUTO B2: NORMAL
TREATMENT AUTO T1: NORMAL
TREATMENT AUTO T2: NORMAL
UFH PPP CHRO-ACNC: <0.1 IU/ML
UNACCEPTABLE ANTIGEN: NORMAL
UNOS CPRA: 0
UROBILINOGEN UR STRIP-MCNC: NORMAL MG/DL (ref 0–2)
VANCOMYCIN SERPL-MCNC: 16 MG/L
WBC # BLD AUTO: 11 10E9/L (ref 4–11)
WBC # BLD AUTO: 12.2 10E9/L (ref 4–11)
WBC # BLD AUTO: 13.3 10E9/L (ref 4–11)
WBC # BLD AUTO: 13.8 10E9/L (ref 4–11)
WBC #/AREA URNS AUTO: 8 /HPF (ref 0–5)

## 2021-05-07 PROCEDURE — 250N000013 HC RX MED GY IP 250 OP 250 PS 637: Performed by: STUDENT IN AN ORGANIZED HEALTH CARE EDUCATION/TRAINING PROGRAM

## 2021-05-07 PROCEDURE — 84300 ASSAY OF URINE SODIUM: CPT | Performed by: STUDENT IN AN ORGANIZED HEALTH CARE EDUCATION/TRAINING PROGRAM

## 2021-05-07 PROCEDURE — 999N001017 HC STATISTIC GLUCOSE BY METER IP

## 2021-05-07 PROCEDURE — 80076 HEPATIC FUNCTION PANEL: CPT | Performed by: STUDENT IN AN ORGANIZED HEALTH CARE EDUCATION/TRAINING PROGRAM

## 2021-05-07 PROCEDURE — 85520 HEPARIN ASSAY: CPT | Performed by: STUDENT IN AN ORGANIZED HEALTH CARE EDUCATION/TRAINING PROGRAM

## 2021-05-07 PROCEDURE — 94640 AIRWAY INHALATION TREATMENT: CPT

## 2021-05-07 PROCEDURE — 999N000157 HC STATISTIC RCP TIME EA 10 MIN

## 2021-05-07 PROCEDURE — 999N000015 HC STATISTIC ARTERIAL MONITORING DAILY

## 2021-05-07 PROCEDURE — 250N000011 HC RX IP 250 OP 636: Performed by: SURGERY

## 2021-05-07 PROCEDURE — 82570 ASSAY OF URINE CREATININE: CPT | Performed by: STUDENT IN AN ORGANIZED HEALTH CARE EDUCATION/TRAINING PROGRAM

## 2021-05-07 PROCEDURE — 250N000009 HC RX 250: Performed by: STUDENT IN AN ORGANIZED HEALTH CARE EDUCATION/TRAINING PROGRAM

## 2021-05-07 PROCEDURE — 85027 COMPLETE CBC AUTOMATED: CPT | Performed by: STUDENT IN AN ORGANIZED HEALTH CARE EDUCATION/TRAINING PROGRAM

## 2021-05-07 PROCEDURE — 258N000003 HC RX IP 258 OP 636: Performed by: STUDENT IN AN ORGANIZED HEALTH CARE EDUCATION/TRAINING PROGRAM

## 2021-05-07 PROCEDURE — 250N000011 HC RX IP 250 OP 636: Performed by: STUDENT IN AN ORGANIZED HEALTH CARE EDUCATION/TRAINING PROGRAM

## 2021-05-07 PROCEDURE — 94644 CONT INHLJ TX 1ST HOUR: CPT

## 2021-05-07 PROCEDURE — 71045 X-RAY EXAM CHEST 1 VIEW: CPT | Mod: 26 | Performed by: RADIOLOGY

## 2021-05-07 PROCEDURE — 250N000013 HC RX MED GY IP 250 OP 250 PS 637: Performed by: NURSE PRACTITIONER

## 2021-05-07 PROCEDURE — 82805 BLOOD GASES W/O2 SATURATION: CPT | Performed by: NURSE PRACTITIONER

## 2021-05-07 PROCEDURE — 86923 COMPATIBILITY TEST ELECTRIC: CPT | Performed by: INTERNAL MEDICINE

## 2021-05-07 PROCEDURE — 250N000012 HC RX MED GY IP 250 OP 636 PS 637: Performed by: STUDENT IN AN ORGANIZED HEALTH CARE EDUCATION/TRAINING PROGRAM

## 2021-05-07 PROCEDURE — 999N000045 HC STATISTIC DAILY SWAN MONITORING

## 2021-05-07 PROCEDURE — 200N000002 HC R&B ICU UMMC

## 2021-05-07 PROCEDURE — 250N000012 HC RX MED GY IP 250 OP 636 PS 637: Performed by: SURGERY

## 2021-05-07 PROCEDURE — 85610 PROTHROMBIN TIME: CPT | Performed by: STUDENT IN AN ORGANIZED HEALTH CARE EDUCATION/TRAINING PROGRAM

## 2021-05-07 PROCEDURE — 86901 BLOOD TYPING SEROLOGIC RH(D): CPT | Performed by: INTERNAL MEDICINE

## 2021-05-07 PROCEDURE — 83605 ASSAY OF LACTIC ACID: CPT | Performed by: SURGERY

## 2021-05-07 PROCEDURE — 80048 BASIC METABOLIC PNL TOTAL CA: CPT | Performed by: STUDENT IN AN ORGANIZED HEALTH CARE EDUCATION/TRAINING PROGRAM

## 2021-05-07 PROCEDURE — 94003 VENT MGMT INPAT SUBQ DAY: CPT

## 2021-05-07 PROCEDURE — 250N000011 HC RX IP 250 OP 636: Performed by: NURSE PRACTITIONER

## 2021-05-07 PROCEDURE — 85004 AUTOMATED DIFF WBC COUNT: CPT | Performed by: STUDENT IN AN ORGANIZED HEALTH CARE EDUCATION/TRAINING PROGRAM

## 2021-05-07 PROCEDURE — 86900 BLOOD TYPING SEROLOGIC ABO: CPT | Performed by: INTERNAL MEDICINE

## 2021-05-07 PROCEDURE — 83735 ASSAY OF MAGNESIUM: CPT | Performed by: STUDENT IN AN ORGANIZED HEALTH CARE EDUCATION/TRAINING PROGRAM

## 2021-05-07 PROCEDURE — 81001 URINALYSIS AUTO W/SCOPE: CPT | Performed by: STUDENT IN AN ORGANIZED HEALTH CARE EDUCATION/TRAINING PROGRAM

## 2021-05-07 PROCEDURE — 258N000003 HC RX IP 258 OP 636: Performed by: SURGERY

## 2021-05-07 PROCEDURE — 82805 BLOOD GASES W/O2 SATURATION: CPT | Performed by: SURGERY

## 2021-05-07 PROCEDURE — 71045 X-RAY EXAM CHEST 1 VIEW: CPT

## 2021-05-07 PROCEDURE — 99291 CRITICAL CARE FIRST HOUR: CPT | Mod: GC | Performed by: INTERNAL MEDICINE

## 2021-05-07 PROCEDURE — 99291 CRITICAL CARE FIRST HOUR: CPT | Mod: 24 | Performed by: NURSE PRACTITIONER

## 2021-05-07 PROCEDURE — 83605 ASSAY OF LACTIC ACID: CPT | Performed by: NURSE PRACTITIONER

## 2021-05-07 PROCEDURE — 80053 COMPREHEN METABOLIC PANEL: CPT | Performed by: STUDENT IN AN ORGANIZED HEALTH CARE EDUCATION/TRAINING PROGRAM

## 2021-05-07 PROCEDURE — C9113 INJ PANTOPRAZOLE SODIUM, VIA: HCPCS | Performed by: STUDENT IN AN ORGANIZED HEALTH CARE EDUCATION/TRAINING PROGRAM

## 2021-05-07 PROCEDURE — 86850 RBC ANTIBODY SCREEN: CPT | Performed by: INTERNAL MEDICINE

## 2021-05-07 PROCEDURE — 80202 ASSAY OF VANCOMYCIN: CPT | Performed by: STUDENT IN AN ORGANIZED HEALTH CARE EDUCATION/TRAINING PROGRAM

## 2021-05-07 PROCEDURE — 999N000155 HC STATISTIC RAPCV CVP MONITORING

## 2021-05-07 PROCEDURE — 250N000013 HC RX MED GY IP 250 OP 250 PS 637: Performed by: SURGERY

## 2021-05-07 PROCEDURE — 94645 CONT INHLJ TX EACH ADDL HOUR: CPT

## 2021-05-07 RX ORDER — MYCOPHENOLATE MOFETIL 200 MG/ML
1500 POWDER, FOR SUSPENSION ORAL 2 TIMES DAILY
Status: DISCONTINUED | OUTPATIENT
Start: 2021-05-07 | End: 2021-05-08

## 2021-05-07 RX ORDER — HEPARIN SODIUM 5000 [USP'U]/.5ML
5000 INJECTION, SOLUTION INTRAVENOUS; SUBCUTANEOUS EVERY 8 HOURS
Status: DISCONTINUED | OUTPATIENT
Start: 2021-05-07 | End: 2021-05-31 | Stop reason: HOSPADM

## 2021-05-07 RX ORDER — FUROSEMIDE 10 MG/ML
80 INJECTION INTRAMUSCULAR; INTRAVENOUS ONCE
Status: COMPLETED | OUTPATIENT
Start: 2021-05-07 | End: 2021-05-07

## 2021-05-07 RX ADMIN — ACETAMINOPHEN 975 MG: 325 TABLET, FILM COATED ORAL at 14:14

## 2021-05-07 RX ADMIN — ISOPROTERENOL HYDROCHLORIDE 0.01 MCG/KG/MIN: 0.2 INJECTION, SOLUTION INTRAMUSCULAR; INTRAVENOUS at 10:39

## 2021-05-07 RX ADMIN — FLUTICASONE PROPIONATE AND SALMETEROL XINAFOATE 2 PUFF: 115; 21 AEROSOL, METERED RESPIRATORY (INHALATION) at 08:38

## 2021-05-07 RX ADMIN — SENNOSIDES AND DOCUSATE SODIUM 1 TABLET: 8.6; 5 TABLET ORAL at 08:11

## 2021-05-07 RX ADMIN — EPINEPHRINE 0.06 MCG/KG/MIN: 1 INJECTION PARENTERAL at 02:35

## 2021-05-07 RX ADMIN — DOCUSATE SODIUM 50 MG AND SENNOSIDES 8.6 MG 2 TABLET: 8.6; 5 TABLET, FILM COATED ORAL at 20:32

## 2021-05-07 RX ADMIN — GABAPENTIN 300 MG: 300 CAPSULE ORAL at 08:11

## 2021-05-07 RX ADMIN — IPRATROPIUM BROMIDE AND ALBUTEROL SULFATE 3 ML: .5; 3 SOLUTION RESPIRATORY (INHALATION) at 12:27

## 2021-05-07 RX ADMIN — EPINEPHRINE 0.05 MCG/KG/MIN: 1 INJECTION PARENTERAL at 19:13

## 2021-05-07 RX ADMIN — Medication 10 MG: at 22:11

## 2021-05-07 RX ADMIN — Medication 4 NG/KG/MIN: at 08:06

## 2021-05-07 RX ADMIN — PREDNISONE 50 MG: 20 TABLET ORAL at 20:19

## 2021-05-07 RX ADMIN — POLYETHYLENE GLYCOL 3350 17 G: 17 POWDER, FOR SOLUTION ORAL at 08:10

## 2021-05-07 RX ADMIN — MONTELUKAST 10 MG: 10 TABLET, FILM COATED ORAL at 22:10

## 2021-05-07 RX ADMIN — DOCUSATE SODIUM 100 MG: 50 LIQUID ORAL at 08:10

## 2021-05-07 RX ADMIN — ACETAMINOPHEN 975 MG: 325 TABLET, FILM COATED ORAL at 22:10

## 2021-05-07 RX ADMIN — METHYLPREDNISOLONE SODIUM SUCCINATE 125 MG: 125 INJECTION, POWDER, FOR SOLUTION INTRAMUSCULAR; INTRAVENOUS at 04:06

## 2021-05-07 RX ADMIN — SENNOSIDES AND DOCUSATE SODIUM 1 TABLET: 8.6; 5 TABLET ORAL at 20:19

## 2021-05-07 RX ADMIN — DOCUSATE SODIUM 100 MG: 50 LIQUID ORAL at 20:19

## 2021-05-07 RX ADMIN — MYCOPHENOLATE MOFETIL 1500 MG: 500 INJECTION, POWDER, LYOPHILIZED, FOR SOLUTION INTRAVENOUS at 08:14

## 2021-05-07 RX ADMIN — GABAPENTIN 600 MG: 300 CAPSULE ORAL at 22:11

## 2021-05-07 RX ADMIN — CYCLOBENZAPRINE 5 MG: 5 TABLET, FILM COATED ORAL at 23:33

## 2021-05-07 RX ADMIN — Medication 37.5 MG: at 22:11

## 2021-05-07 RX ADMIN — GABAPENTIN 600 MG: 300 CAPSULE ORAL at 13:00

## 2021-05-07 RX ADMIN — VANCOMYCIN HYDROCHLORIDE 1500 MG: 10 INJECTION, POWDER, LYOPHILIZED, FOR SOLUTION INTRAVENOUS at 20:32

## 2021-05-07 RX ADMIN — IPRATROPIUM BROMIDE AND ALBUTEROL SULFATE 3 ML: .5; 3 SOLUTION RESPIRATORY (INHALATION) at 08:34

## 2021-05-07 RX ADMIN — GABAPENTIN 600 MG: 300 CAPSULE ORAL at 14:15

## 2021-05-07 RX ADMIN — HUMAN INSULIN 4 UNITS/HR: 100 INJECTION, SOLUTION SUBCUTANEOUS at 04:43

## 2021-05-07 RX ADMIN — FLUTICASONE PROPIONATE AND SALMETEROL XINAFOATE 2 PUFF: 115; 21 AEROSOL, METERED RESPIRATORY (INHALATION) at 20:19

## 2021-05-07 RX ADMIN — ASPIRIN 81 MG CHEWABLE TABLET 81 MG: 81 TABLET CHEWABLE at 10:19

## 2021-05-07 RX ADMIN — Medication 2 NG/KG/MIN: at 10:13

## 2021-05-07 RX ADMIN — Medication 1 NG/KG/MIN: at 11:00

## 2021-05-07 RX ADMIN — LIDOCAINE 2 PATCH: 560 PATCH PERCUTANEOUS; TOPICAL; TRANSDERMAL at 08:09

## 2021-05-07 RX ADMIN — PREDNISONE 50 MG: 20 TABLET ORAL at 08:12

## 2021-05-07 RX ADMIN — HYDROMORPHONE HYDROCHLORIDE 0.2 MG: 0.2 INJECTION, SOLUTION INTRAMUSCULAR; INTRAVENOUS; SUBCUTANEOUS at 22:19

## 2021-05-07 RX ADMIN — Medication 0.06 MCG/KG/MIN: at 02:30

## 2021-05-07 RX ADMIN — HEPARIN SODIUM 5000 UNITS: 5000 INJECTION, SOLUTION INTRAVENOUS; SUBCUTANEOUS at 19:13

## 2021-05-07 RX ADMIN — MYCOPHENOLATE MOFETIL 1500 MG: 200 POWDER, FOR SUSPENSION ORAL at 20:20

## 2021-05-07 RX ADMIN — IPRATROPIUM BROMIDE AND ALBUTEROL SULFATE 3 ML: .5; 3 SOLUTION RESPIRATORY (INHALATION) at 19:59

## 2021-05-07 RX ADMIN — ACETAMINOPHEN 975 MG: 325 TABLET, FILM COATED ORAL at 06:37

## 2021-05-07 RX ADMIN — PANTOPRAZOLE SODIUM 40 MG: 40 INJECTION, POWDER, FOR SOLUTION INTRAVENOUS at 08:10

## 2021-05-07 RX ADMIN — ALLOPURINOL 300 MG: 300 TABLET ORAL at 10:53

## 2021-05-07 RX ADMIN — CEFEPIME HYDROCHLORIDE 2 G: 2 INJECTION, POWDER, FOR SOLUTION INTRAVENOUS at 20:20

## 2021-05-07 RX ADMIN — FUROSEMIDE 80 MG: 10 INJECTION, SOLUTION INTRAVENOUS at 12:59

## 2021-05-07 RX ADMIN — BUPROPION HYDROCHLORIDE 100 MG: 100 TABLET, FILM COATED ORAL at 20:19

## 2021-05-07 RX ADMIN — Medication 3 NG/KG/MIN: at 09:20

## 2021-05-07 RX ADMIN — HEPARIN SODIUM 5000 UNITS: 5000 INJECTION, SOLUTION INTRAVENOUS; SUBCUTANEOUS at 10:20

## 2021-05-07 RX ADMIN — BUPROPION HYDROCHLORIDE 100 MG: 100 TABLET, FILM COATED ORAL at 08:13

## 2021-05-07 RX ADMIN — PROPOFOL 10 MCG/KG/MIN: 10 INJECTION, EMULSION INTRAVENOUS at 00:34

## 2021-05-07 ASSESSMENT — ACTIVITIES OF DAILY LIVING (ADL)
ADLS_ACUITY_SCORE: 17

## 2021-05-07 ASSESSMENT — MIFFLIN-ST. JEOR: SCORE: 1854.5

## 2021-05-07 NOTE — PROGRESS NOTES
[facility]  CARDIOLOGY HEART FAILURE SERVICE (CARDS II) PROGRESS NOTE    Patient Name: Jim Willingham    Medical Record Number: [id number]    YOB: 1955  PCP: Ricardo Gómez    Admit Date/Time: 2/8/2021  2:55 PM   88    Assessment and Plan:  Mr. Jim Willingham is a 65 year old male with history of NICM EF 20% c/b VT s/p CRT-D s/p HMII LVAD 6/19/2017 originally intended as destination therapy 2/2 obesity however with recent weight loss now candidate for transplantation. He is admitted for worsening functional status and renal function concerning for worsening heart failure. Listed status 2E for transplant s/p OHT 5/6/2021      Today's plan:   - continue epinephrine   - wean norepinephrine as tolerated  - wean inhaled epoprostenol  - work towards extubation  - maintain HR > 100 on isoproterenol   - monitor hemodynamics Q4h  - started prednisone taper  - Basiliximab 20 mg IV, second dose due 5/10/2021  - Endomyocardial biopsy: first 5/13/2021    #S/p OHT  #Chronic systolic heart failure secondary to NICM s/p HM II LVAD.   Pressors: on levophed, epinephrine   Inhaled agents: epoprostenol  Other drips: isoproterenol, propofol, insulin, fentanyl   Epicardial leads in place, currently  with isoproterenol   Hemodynamics this AM: CVP 13, PA 23/17, PCWP 14, SvO2 58% ROBERTO/CI 6/2.7    Serostatus: Donor unknown  CMV: D?, R+, EBV: R+, Toxo R-  Blood type: O+     Immunosuppression:  -Calcineurin Inhibitor: due to GFR 30-59, anti-IL-2 given - Basiliximab 20 mg IV , second dose due 5/10/2021; tacrolimus to follow pending renal function with goal level 10-15 for first 3 months  -Cell cycle Inhibitor: start Cellcept 1500 mg BID (generic 1500 mg given preoperatively)  -Steroid: methylprednisolone 1 gram given preoperatively, followed by 500 mg IV at release of cross clamp; start 125 mg IV q8 hours x 3 doses tomorrow morning, followed by prednisone 1 mg/kg daily (in BID dosing, taper 5 mg  daily)  -Endomyocardial biopsy: first 5/13/2021 ; weekly 4 weeks, bi-weekly 2 months, monthly 3-6 months     Infection Prophylaxis:  -PCP/Toxo: Dapsone (sulfa allergy)  -Thrush: Nystatin  -CMV (D/R-): will start Valcyte pending renal function; 3-6 months total (pending donor status)   -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 Renu Sears MD, attending physician, who agrees with the assessment and plan above.     Karthik Wilson MD  Cardiology Fellow    Interval Changes in Past 24 Hours:   No acute events overnight.    Review Of Systems  A 4-point ROS was negative aside from those listed above.    OBJECTIVE FINDINGS:    Temp:  [99.7  F (37.6  C)-100.9  F (38.3  C)] 100.4  F (38  C)  Pulse:  [] 110  Resp:  [11-21] 13  MAP:  [62 mmHg-74 mmHg] 68 mmHg  Arterial Line BP: ()/(47-62) 91/57  FiO2 (%):  [40 %] 40 %  SpO2:  [98 %-100 %] 100 %    Gen: Patient is intubated, sedated, NAD.    HEENT: PERRLA, EOMI, MMM  Neck: JVP estimated at 10cm  Resp: clear to auscultation bilaterally, no crackles or wheezing   CV: RRR, no murmurs appreciated  Abd: soft, NT, ND, no hepatosplenomegaly, normal BS  Ext: warm and well perfused, no LE edema    Lines, Tubes, and Devices:  Vascular Access:   - RIJ 9Fr MAC sheath, PA catheter (Insertion date: 5/6/2021)  - Right radial arterial line (Insertion date: 5/6/2021)  Tubes:  - Russell cath 5/6/2021  - ETT (Insertion date 5/6/2021)  - Chest tubes, x1 pericardial, x1 mediastinal    Devices:    Invasive Hemodynamic Monitoring:  CVP: 11  PCWP: 10  PAP: 26/14(18)  Mixed venous O2 sat: 72%  Estimated CO/ CI: 7.1/3.2  CVP 11, PA 26/14 (18), PCWP 10  Intake/Output Summary (Last 24 hours) at 5/6/2021 0644  Last data filed at 5/6/2021 0553  Gross per 24 hour   Intake 3745 ml   Output 2250 ml   Net 1495 ml     Wt Readings from Last 5 Encounters:   05/07/21 108.5 kg (239 lb 3.2 oz)   04/27/21 102.5 kg (226 lb)   04/21/21  103 kg (227 lb)   04/14/21 101.2 kg (223 lb)   04/07/21 99.8 kg (220 lb)       Current medications     acetaminophen  975 mg Oral Q8H     allopurinol  300 mg Oral Daily     amitriptyline  37.5 mg Oral At Bedtime     aspirin  81 mg Oral or Feeding Tube Daily     buPROPion  100 mg Oral BID     ceFEPIme (MAXIPIME) IV  2 g Intravenous Q24H     [START ON 5/11/2021] dapsone  100 mg Oral Daily    Or     [START ON 5/11/2021] dapsone  100 mg Oral or NG Tube Daily     docusate  100 mg Oral BID     fluticasone-salmeterol  2 puff Inhalation Q12H     gabapentin  300 mg Oral Daily     gabapentin  600 mg Oral BID     heparin ANTICOAGULANT  5,000 Units Subcutaneous Q8H     ipratropium - albuterol 0.5 mg/2.5 mg/3 mL  3 mL Nebulization 4x daily     lidocaine  2 patch Transdermal Q24H     lidocaine   Transdermal Q8H     montelukast  10 mg Oral At Bedtime     mycophenolate  1,500 mg Oral or Feeding Tube BID     [START ON 5/11/2021] nystatin  1,000,000 Units Oral 4x Daily     [START ON 5/8/2021] pantoprazole  40 mg Oral or Feeding Tube Daily     polyethylene glycol  17 g Oral Daily     predniSONE  50 mg Oral or NG Tube BID    Followed by     [START ON 5/8/2021] predniSONE  45 mg Oral or NG Tube BID    Followed by     [START ON 5/10/2021] predniSONE  40 mg Oral or NG Tube BID    Followed by     [START ON 5/12/2021] predniSONE  35 mg Oral or NG Tube BID    Followed by     [START ON 5/14/2021] predniSONE  30 mg Oral or NG Tube BID    Followed by     [START ON 5/16/2021] predniSONE  25 mg Oral or NG Tube BID    Followed by     [START ON 5/18/2021] predniSONE  20 mg Oral or NG Tube BID     senna-docusate  1 tablet Oral BID     sodium chloride (PF)  3 mL Intracatheter Q8H     [START ON 5/11/2021] valGANciclovir  450 mg Oral Daily    Or     [START ON 5/11/2021] valGANciclovir  450 mg Oral or NG Tube Daily     [Held by provider] vancomycin (VANCOCIN) IV  1,500 mg Intravenous Q18H     vancomycin place mandel - receiving intermittent dosing  1  each Intravenous See Admin Instructions       dextrose       EPINEPHrine 0.05 mcg/kg/min (05/07/21 1400)     epoprostenol (VELETRI) 20 mcg/mL in sterile water inhalation solution 1 ng/kg/min (05/07/21 1100)     fentaNYL Stopped (05/07/21 1200)     insulin (regular) 1.5 Units/hr (05/07/21 1400)     isoproterenol (ISUPREL) infusion ADULT 0.01 mcg/kg/min (05/07/21 1400)     - MEDICATION INSTRUCTIONS -       norepinephrine 0.02 mcg/kg/min (05/07/21 1400)     - MEDICATION INSTRUCTIONS -       propofol (DIPRIVAN) infusion Stopped (05/07/21 1100)     ACE/ARB/ARNI NOT PRESCRIBED       [START ON 5/9/2021] acetaminophen, albuterol, alum & mag hydroxide-simethicone, sore throat lozenge, bisacodyl, capsaicin, cyclobenzaprine, dextrose, glucose **OR** dextrose **OR** glucagon, diclofenac, HYDROmorphone **OR** HYDROmorphone, hydrOXYzine, lidocaine 4%, lidocaine, lidocaine (buffered or not buffered), magnesium hydroxide, - MEDICATION INSTRUCTIONS -, melatonin, menthol (Topical Analgesic) 2.5%, naloxone **OR** naloxone **OR** naloxone **OR** naloxone, nitroGLYcerin, ondansetron **OR** ondansetron, oxyCODONE **OR** oxyCODONE, - MEDICATION INSTRUCTIONS -, polyethylene glycol, prochlorperazine **OR** prochlorperazine, ACE/ARB/ARNI NOT PRESCRIBED, senna-docusate, simethicone, sodium chloride (PF), sodium chloride (PF), sodium chloride (PF), sodium phosphate, traMADol    LABS Reviewed  IMAGES Reviewed

## 2021-05-07 NOTE — PLAN OF CARE
D;Pt remains intubated and sedated by propofol gtt with fentanyl gtt.Follow commands moves all extremities.Nodded to pain questions.Wean Levophed and Epi gtt to 0.05mcq from 0.1mcq.See hemodynamic figures include CCO monitor and Kee gigurations.UO 40-60cc/hr.CT drain minimum.Hgb staying at 8.0.Sx w/tan thick secretions with weak cough.BS up and down w/steroid.Back up pacer at 100.-103 ST no ectopies.Mediasternal incision cover w/wound vac dry.Floran at 5ppm per ordered all nite.  I;A;Tolerating wean pressors well.Altered fluids imbalances.  P;Continue to wean pressors as tolerated.Will wean floran this am prior to wean vent.Continue to assess fluids status.

## 2021-05-07 NOTE — PLAN OF CARE
Propofol and Fentanyl weaned off. Norepi titrated to 0.02 from 0.05, will continue to titrate per MAP. Epi at 0.05. Pt Pressure supported from 1230pm to 1430 pm. 7/5 40% w/ sats 100% RR 12-13. Pt follows all commands HEAD's and can lift head off of pillow. s/p ABG with good result. MD notified, orders received to Extubate.

## 2021-05-07 NOTE — PROGRESS NOTES
[facility], Procedure Note          Extubation:       Jim Willingham  MRN# [id number]   May 7, 2021, 3:10 PM         Patient extubated at: May 7, 2021, 3:10 PM   Supplemental Oxygen: Via nasal cannula at 4 liters per minute   Cough: The cough is good   Secretion Mode: PRN suction with assistance   Secretion Amount: Small amount, thick and tan in color   Respiratory Exam:: Breath sounds: good aeration     Location: bilaterally   Skin Exam:: Patient color: natural   Patient Status: Currently appears comfortable   Arterial Blood Gasses: pH Arterial (pH)   Date Value   05/07/2021 7.39     pO2 Arterial (mm Hg)   Date Value   05/07/2021 130 (H)     pCO2 Arterial (mm Hg)   Date Value   05/07/2021 40     Bicarbonate Arterial (mmol/L)   Date Value   05/07/2021 24            Recorded by Eugenia Colin

## 2021-05-07 NOTE — PROGRESS NOTES
Transplant Social Work Services Progress Note      Date of Initial Social Work Evaluation: 2/10/2021  Collaborated with: pt's wife, Cate, at bedside     Data: Pt is s/p LVAD explant and heart transplant on 5/6. Working towards extubation today.   Met w/ pt's wife to provide support and also provide pharmacy test claims that were submitted. At this time there are no financial or insurance concerns with pt's immunosuppression medication.     Intervention: Supportive Visit   Assessment: Pt's wife coping well post-transplant and eager for potential extubation today. She has no immediate questions or concerns at this time.   Education provided by SW: Ongoing Social Work support   Plan:    Discharge Plans in Progress: Will continue to assess discharge needs post-transplant     Barriers to d/c plan: Medical     Follow up Plan: CLIFF to continue to follow.

## 2021-05-08 ENCOUNTER — APPOINTMENT (OUTPATIENT)
Dept: OCCUPATIONAL THERAPY | Facility: CLINIC | Age: 66
DRG: 001 | End: 2021-05-08
Attending: INTERNAL MEDICINE
Payer: COMMERCIAL

## 2021-05-08 ENCOUNTER — HEALTH MAINTENANCE LETTER (OUTPATIENT)
Age: 66
End: 2021-05-08

## 2021-05-08 ENCOUNTER — APPOINTMENT (OUTPATIENT)
Dept: GENERAL RADIOLOGY | Facility: CLINIC | Age: 66
DRG: 001 | End: 2021-05-08
Attending: INTERNAL MEDICINE
Payer: COMMERCIAL

## 2021-05-08 LAB
ABO + RH BLD: NORMAL
ABO + RH BLD: NORMAL
ALBUMIN SERPL-MCNC: 2.9 G/DL (ref 3.4–5)
ALP SERPL-CCNC: 48 U/L (ref 40–150)
ALT SERPL W P-5'-P-CCNC: 33 U/L (ref 0–70)
ANION GAP SERPL CALCULATED.3IONS-SCNC: 11 MMOL/L (ref 3–14)
AST SERPL W P-5'-P-CCNC: 54 U/L (ref 0–45)
BASE DEFICIT BLDV-SCNC: 0.7 MMOL/L
BASE DEFICIT BLDV-SCNC: 1.5 MMOL/L
BASE DEFICIT BLDV-SCNC: 2.5 MMOL/L
BASE DEFICIT BLDV-SCNC: 5 MMOL/L
BASE DEFICIT BLDV-SCNC: 5.3 MMOL/L
BILIRUB SERPL-MCNC: 0.9 MG/DL (ref 0.2–1.3)
BLD GP AB SCN SERPL QL: NORMAL
BLD PROD TYP BPU: NORMAL
BLD UNIT ID BPU: 0
BLD UNIT ID BPU: 0
BLOOD BANK CMNT PATIENT-IMP: NORMAL
BLOOD PRODUCT CODE: NORMAL
BLOOD PRODUCT CODE: NORMAL
BPU ID: NORMAL
BPU ID: NORMAL
BUN SERPL-MCNC: 82 MG/DL (ref 7–30)
CALCIUM SERPL-MCNC: 7.7 MG/DL (ref 8.5–10.1)
CHLORIDE SERPL-SCNC: 105 MMOL/L (ref 94–109)
CO2 SERPL-SCNC: 21 MMOL/L (ref 20–32)
CREAT SERPL-MCNC: 2.53 MG/DL (ref 0.66–1.25)
ERYTHROCYTE [DISTWIDTH] IN BLOOD BY AUTOMATED COUNT: 15.3 % (ref 10–15)
ERYTHROCYTE [DISTWIDTH] IN BLOOD BY AUTOMATED COUNT: 15.4 % (ref 10–15)
G6PD RBC-CCNC: 8.1 U/G HB (ref 9.9–16.6)
GFR SERPL CREATININE-BSD FRML MDRD: 26 ML/MIN/{1.73_M2}
GLUCOSE BLDC GLUCOMTR-MCNC: 109 MG/DL (ref 70–99)
GLUCOSE BLDC GLUCOMTR-MCNC: 128 MG/DL (ref 70–99)
GLUCOSE BLDC GLUCOMTR-MCNC: 131 MG/DL (ref 70–99)
GLUCOSE BLDC GLUCOMTR-MCNC: 134 MG/DL (ref 70–99)
GLUCOSE BLDC GLUCOMTR-MCNC: 150 MG/DL (ref 70–99)
GLUCOSE BLDC GLUCOMTR-MCNC: 244 MG/DL (ref 70–99)
GLUCOSE BLDC GLUCOMTR-MCNC: 286 MG/DL (ref 70–99)
GLUCOSE BLDC GLUCOMTR-MCNC: 297 MG/DL (ref 70–99)
GLUCOSE SERPL-MCNC: 145 MG/DL (ref 70–99)
HCO3 BLDV-SCNC: 21 MMOL/L (ref 21–28)
HCO3 BLDV-SCNC: 21 MMOL/L (ref 21–28)
HCO3 BLDV-SCNC: 23 MMOL/L (ref 21–28)
HCO3 BLDV-SCNC: 24 MMOL/L (ref 21–28)
HCO3 BLDV-SCNC: 25 MMOL/L (ref 21–28)
HCT VFR BLD AUTO: 21 % (ref 40–53)
HCT VFR BLD AUTO: 23.7 % (ref 40–53)
HGB BLD-MCNC: 6.9 G/DL (ref 13.3–17.7)
HGB BLD-MCNC: 7.6 G/DL (ref 13.3–17.7)
LACTATE BLD-SCNC: 1 MMOL/L (ref 0.7–2)
LACTATE BLD-SCNC: 1.2 MMOL/L (ref 0.7–2)
MCH RBC QN AUTO: 29.6 PG (ref 26.5–33)
MCH RBC QN AUTO: 30.9 PG (ref 26.5–33)
MCHC RBC AUTO-ENTMCNC: 32.1 G/DL (ref 31.5–36.5)
MCHC RBC AUTO-ENTMCNC: 32.9 G/DL (ref 31.5–36.5)
MCV RBC AUTO: 92 FL (ref 78–100)
MCV RBC AUTO: 94 FL (ref 78–100)
NUM BPU REQUESTED: 4
O2/TOTAL GAS SETTING VFR VENT: 2 %
O2/TOTAL GAS SETTING VFR VENT: 2 %
O2/TOTAL GAS SETTING VFR VENT: 21 %
O2/TOTAL GAS SETTING VFR VENT: ABNORMAL %
O2/TOTAL GAS SETTING VFR VENT: NORMAL %
OXYHGB MFR BLDV: 42 %
OXYHGB MFR BLDV: 42 %
OXYHGB MFR BLDV: 46 %
OXYHGB MFR BLDV: 46 %
OXYHGB MFR BLDV: 48 %
PCO2 BLDV: 43 MM HG (ref 40–50)
PCO2 BLDV: 43 MM HG (ref 40–50)
PCO2 BLDV: 44 MM HG (ref 40–50)
PCO2 BLDV: 45 MM HG (ref 40–50)
PCO2 BLDV: 46 MM HG (ref 40–50)
PH BLDV: 7.29 PH (ref 7.32–7.43)
PH BLDV: 7.3 PH (ref 7.32–7.43)
PH BLDV: 7.34 PH (ref 7.32–7.43)
PLATELET # BLD AUTO: 119 10E9/L (ref 150–450)
PLATELET # BLD AUTO: 129 10E9/L (ref 150–450)
PO2 BLDV: 26 MM HG (ref 25–47)
PO2 BLDV: 28 MM HG (ref 25–47)
PO2 BLDV: 28 MM HG (ref 25–47)
PO2 BLDV: 29 MM HG (ref 25–47)
PO2 BLDV: 31 MM HG (ref 25–47)
POTASSIUM SERPL-SCNC: 4 MMOL/L (ref 3.4–5.3)
POTASSIUM SERPL-SCNC: 4.2 MMOL/L (ref 3.4–5.3)
PROT SERPL-MCNC: 5.1 G/DL (ref 6.8–8.8)
RBC # BLD AUTO: 2.23 10E12/L (ref 4.4–5.9)
RBC # BLD AUTO: 2.57 10E12/L (ref 4.4–5.9)
SODIUM SERPL-SCNC: 137 MMOL/L (ref 133–144)
SPECIMEN EXP DATE BLD: NORMAL
TRANSFUSION STATUS PATIENT QL: NORMAL
UFH PPP CHRO-ACNC: <0.1 IU/ML
VANCOMYCIN SERPL-MCNC: 18.5 MG/L
WBC # BLD AUTO: 10.4 10E9/L (ref 4–11)
WBC # BLD AUTO: 13.2 10E9/L (ref 4–11)

## 2021-05-08 PROCEDURE — 250N000012 HC RX MED GY IP 250 OP 636 PS 637: Performed by: SURGERY

## 2021-05-08 PROCEDURE — 250N000011 HC RX IP 250 OP 636: Performed by: INTERNAL MEDICINE

## 2021-05-08 PROCEDURE — 94640 AIRWAY INHALATION TREATMENT: CPT | Mod: 76

## 2021-05-08 PROCEDURE — P9016 RBC LEUKOCYTES REDUCED: HCPCS | Performed by: INTERNAL MEDICINE

## 2021-05-08 PROCEDURE — 258N000003 HC RX IP 258 OP 636: Performed by: STUDENT IN AN ORGANIZED HEALTH CARE EDUCATION/TRAINING PROGRAM

## 2021-05-08 PROCEDURE — 250N000013 HC RX MED GY IP 250 OP 250 PS 637: Performed by: SURGERY

## 2021-05-08 PROCEDURE — 99291 CRITICAL CARE FIRST HOUR: CPT | Mod: 24 | Performed by: ANESTHESIOLOGY

## 2021-05-08 PROCEDURE — 250N000012 HC RX MED GY IP 250 OP 636 PS 637: Performed by: STUDENT IN AN ORGANIZED HEALTH CARE EDUCATION/TRAINING PROGRAM

## 2021-05-08 PROCEDURE — 250N000013 HC RX MED GY IP 250 OP 250 PS 637: Performed by: NURSE PRACTITIONER

## 2021-05-08 PROCEDURE — 85027 COMPLETE CBC AUTOMATED: CPT | Performed by: STUDENT IN AN ORGANIZED HEALTH CARE EDUCATION/TRAINING PROGRAM

## 2021-05-08 PROCEDURE — 83605 ASSAY OF LACTIC ACID: CPT | Performed by: NURSE PRACTITIONER

## 2021-05-08 PROCEDURE — 250N000011 HC RX IP 250 OP 636: Performed by: STUDENT IN AN ORGANIZED HEALTH CARE EDUCATION/TRAINING PROGRAM

## 2021-05-08 PROCEDURE — 94640 AIRWAY INHALATION TREATMENT: CPT

## 2021-05-08 PROCEDURE — 250N000011 HC RX IP 250 OP 636: Performed by: SURGERY

## 2021-05-08 PROCEDURE — 83605 ASSAY OF LACTIC ACID: CPT | Performed by: SURGERY

## 2021-05-08 PROCEDURE — 97535 SELF CARE MNGMENT TRAINING: CPT | Mod: GO | Performed by: OCCUPATIONAL THERAPIST

## 2021-05-08 PROCEDURE — 999N000226 HC STATISTIC SLP IP EVAL DEFER

## 2021-05-08 PROCEDURE — 250N000013 HC RX MED GY IP 250 OP 250 PS 637: Performed by: STUDENT IN AN ORGANIZED HEALTH CARE EDUCATION/TRAINING PROGRAM

## 2021-05-08 PROCEDURE — 80202 ASSAY OF VANCOMYCIN: CPT | Performed by: SURGERY

## 2021-05-08 PROCEDURE — 71045 X-RAY EXAM CHEST 1 VIEW: CPT | Mod: 26 | Performed by: RADIOLOGY

## 2021-05-08 PROCEDURE — 97530 THERAPEUTIC ACTIVITIES: CPT | Mod: GO | Performed by: OCCUPATIONAL THERAPIST

## 2021-05-08 PROCEDURE — 250N000011 HC RX IP 250 OP 636: Performed by: NURSE PRACTITIONER

## 2021-05-08 PROCEDURE — 97165 OT EVAL LOW COMPLEX 30 MIN: CPT | Mod: GO | Performed by: OCCUPATIONAL THERAPIST

## 2021-05-08 PROCEDURE — 999N001017 HC STATISTIC GLUCOSE BY METER IP

## 2021-05-08 PROCEDURE — 82805 BLOOD GASES W/O2 SATURATION: CPT | Performed by: SURGERY

## 2021-05-08 PROCEDURE — 85520 HEPARIN ASSAY: CPT | Performed by: STUDENT IN AN ORGANIZED HEALTH CARE EDUCATION/TRAINING PROGRAM

## 2021-05-08 PROCEDURE — 71045 X-RAY EXAM CHEST 1 VIEW: CPT

## 2021-05-08 PROCEDURE — 999N000157 HC STATISTIC RCP TIME EA 10 MIN

## 2021-05-08 PROCEDURE — 84132 ASSAY OF SERUM POTASSIUM: CPT | Performed by: SURGERY

## 2021-05-08 PROCEDURE — 85027 COMPLETE CBC AUTOMATED: CPT | Performed by: SURGERY

## 2021-05-08 PROCEDURE — 99291 CRITICAL CARE FIRST HOUR: CPT | Mod: GC | Performed by: INTERNAL MEDICINE

## 2021-05-08 PROCEDURE — 250N000009 HC RX 250: Performed by: STUDENT IN AN ORGANIZED HEALTH CARE EDUCATION/TRAINING PROGRAM

## 2021-05-08 PROCEDURE — 200N000002 HC R&B ICU UMMC

## 2021-05-08 PROCEDURE — 80053 COMPREHEN METABOLIC PANEL: CPT | Performed by: STUDENT IN AN ORGANIZED HEALTH CARE EDUCATION/TRAINING PROGRAM

## 2021-05-08 RX ORDER — MYCOPHENOLATE MOFETIL 250 MG/1
1500 CAPSULE ORAL 2 TIMES DAILY
Status: DISCONTINUED | OUTPATIENT
Start: 2021-05-08 | End: 2021-05-24

## 2021-05-08 RX ORDER — FUROSEMIDE 10 MG/ML
40 INJECTION INTRAMUSCULAR; INTRAVENOUS ONCE
Status: COMPLETED | OUTPATIENT
Start: 2021-05-08 | End: 2021-05-08

## 2021-05-08 RX ORDER — FUROSEMIDE 10 MG/ML
100 INJECTION INTRAMUSCULAR; INTRAVENOUS ONCE
Status: DISCONTINUED | OUTPATIENT
Start: 2021-05-08 | End: 2021-05-08

## 2021-05-08 RX ORDER — BUMETANIDE 0.25 MG/ML
2 INJECTION INTRAMUSCULAR; INTRAVENOUS ONCE
Status: DISCONTINUED | OUTPATIENT
Start: 2021-05-08 | End: 2021-05-08

## 2021-05-08 RX ORDER — PANTOPRAZOLE SODIUM 40 MG/1
40 TABLET, DELAYED RELEASE ORAL
Status: DISCONTINUED | OUTPATIENT
Start: 2021-05-08 | End: 2021-05-24

## 2021-05-08 RX ORDER — IPRATROPIUM BROMIDE AND ALBUTEROL SULFATE 2.5; .5 MG/3ML; MG/3ML
3 SOLUTION RESPIRATORY (INHALATION) EVERY 4 HOURS PRN
Status: DISCONTINUED | OUTPATIENT
Start: 2021-05-08 | End: 2021-05-25

## 2021-05-08 RX ORDER — FUROSEMIDE 10 MG/ML
80 INJECTION INTRAMUSCULAR; INTRAVENOUS ONCE
Status: DISCONTINUED | OUTPATIENT
Start: 2021-05-08 | End: 2021-05-08

## 2021-05-08 RX ORDER — FUROSEMIDE 10 MG/ML
20 INJECTION INTRAMUSCULAR; INTRAVENOUS ONCE
Status: DISCONTINUED | OUTPATIENT
Start: 2021-05-08 | End: 2021-05-08

## 2021-05-08 RX ORDER — DOCUSATE SODIUM 100 MG/1
100 CAPSULE, LIQUID FILLED ORAL 2 TIMES DAILY
Status: DISCONTINUED | OUTPATIENT
Start: 2021-05-08 | End: 2021-05-14

## 2021-05-08 RX ORDER — DOBUTAMINE HYDROCHLORIDE 200 MG/100ML
2.5 INJECTION INTRAVENOUS CONTINUOUS
Status: DISCONTINUED | OUTPATIENT
Start: 2021-05-08 | End: 2021-05-09

## 2021-05-08 RX ORDER — FUROSEMIDE 10 MG/ML
80 INJECTION INTRAMUSCULAR; INTRAVENOUS ONCE
Status: COMPLETED | OUTPATIENT
Start: 2021-05-08 | End: 2021-05-08

## 2021-05-08 RX ORDER — ACETAZOLAMIDE 250 MG/1
500 TABLET ORAL
Status: DISCONTINUED | OUTPATIENT
Start: 2021-05-08 | End: 2021-05-08

## 2021-05-08 RX ORDER — TERBUTALINE SULFATE 5 MG/1
10 TABLET ORAL EVERY 8 HOURS SCHEDULED
Status: DISCONTINUED | OUTPATIENT
Start: 2021-05-08 | End: 2021-05-11

## 2021-05-08 RX ORDER — ACETAMINOPHEN 325 MG/1
975 TABLET ORAL EVERY 8 HOURS
Status: DISCONTINUED | OUTPATIENT
Start: 2021-05-08 | End: 2021-05-25

## 2021-05-08 RX ORDER — VANCOMYCIN HYDROCHLORIDE 1 G/200ML
1000 INJECTION, SOLUTION INTRAVENOUS ONCE
Status: COMPLETED | OUTPATIENT
Start: 2021-05-08 | End: 2021-05-08

## 2021-05-08 RX ORDER — OXYCODONE HYDROCHLORIDE 5 MG/1
5 TABLET ORAL EVERY 4 HOURS PRN
Status: DISCONTINUED | OUTPATIENT
Start: 2021-05-08 | End: 2021-05-14

## 2021-05-08 RX ADMIN — MYCOPHENOLATE MOFETIL 1500 MG: 250 CAPSULE ORAL at 19:25

## 2021-05-08 RX ADMIN — MONTELUKAST 10 MG: 10 TABLET, FILM COATED ORAL at 21:56

## 2021-05-08 RX ADMIN — EPINEPHRINE 0.02 MCG/KG/MIN: 1 INJECTION PARENTERAL at 16:29

## 2021-05-08 RX ADMIN — HYDROMORPHONE HYDROCHLORIDE 0.4 MG: 1 INJECTION, SOLUTION INTRAMUSCULAR; INTRAVENOUS; SUBCUTANEOUS at 04:13

## 2021-05-08 RX ADMIN — GABAPENTIN 300 MG: 300 CAPSULE ORAL at 08:26

## 2021-05-08 RX ADMIN — BUPROPION HYDROCHLORIDE 100 MG: 100 TABLET, FILM COATED ORAL at 19:24

## 2021-05-08 RX ADMIN — ASPIRIN 81 MG CHEWABLE TABLET 81 MG: 81 TABLET CHEWABLE at 08:24

## 2021-05-08 RX ADMIN — BUPROPION HYDROCHLORIDE 100 MG: 100 TABLET, FILM COATED ORAL at 08:24

## 2021-05-08 RX ADMIN — ISOPROTERENOL HYDROCHLORIDE 0.01 MCG/KG/MIN: 0.2 INJECTION, SOLUTION INTRAMUSCULAR; INTRAVENOUS at 00:03

## 2021-05-08 RX ADMIN — SENNOSIDES AND DOCUSATE SODIUM 1 TABLET: 8.6; 5 TABLET ORAL at 08:24

## 2021-05-08 RX ADMIN — DOCUSATE SODIUM 100 MG: 100 CAPSULE, LIQUID FILLED ORAL at 19:57

## 2021-05-08 RX ADMIN — ACETAMINOPHEN 975 MG: 325 TABLET, FILM COATED ORAL at 21:56

## 2021-05-08 RX ADMIN — PANTOPRAZOLE SODIUM 40 MG: 40 TABLET, DELAYED RELEASE ORAL at 08:26

## 2021-05-08 RX ADMIN — IPRATROPIUM BROMIDE AND ALBUTEROL SULFATE 3 ML: .5; 3 SOLUTION RESPIRATORY (INHALATION) at 07:54

## 2021-05-08 RX ADMIN — CEFEPIME HYDROCHLORIDE 2 G: 2 INJECTION, POWDER, FOR SOLUTION INTRAVENOUS at 19:57

## 2021-05-08 RX ADMIN — GABAPENTIN 600 MG: 300 CAPSULE ORAL at 14:21

## 2021-05-08 RX ADMIN — OXYCODONE HYDROCHLORIDE 5 MG: 5 TABLET ORAL at 12:31

## 2021-05-08 RX ADMIN — FUROSEMIDE 40 MG: 10 INJECTION, SOLUTION INTRAVENOUS at 23:57

## 2021-05-08 RX ADMIN — FLUTICASONE PROPIONATE AND SALMETEROL XINAFOATE 2 PUFF: 115; 21 AEROSOL, METERED RESPIRATORY (INHALATION) at 19:51

## 2021-05-08 RX ADMIN — TERBUTALINE SULFATE 10 MG: 5 TABLET ORAL at 11:38

## 2021-05-08 RX ADMIN — TERBUTALINE SULFATE 10 MG: 5 TABLET ORAL at 19:25

## 2021-05-08 RX ADMIN — POLYETHYLENE GLYCOL 3350 17 G: 17 POWDER, FOR SOLUTION ORAL at 08:23

## 2021-05-08 RX ADMIN — ACETAMINOPHEN 975 MG: 325 TABLET, FILM COATED ORAL at 14:21

## 2021-05-08 RX ADMIN — IPRATROPIUM BROMIDE AND ALBUTEROL SULFATE 3 ML: .5; 3 SOLUTION RESPIRATORY (INHALATION) at 16:26

## 2021-05-08 RX ADMIN — LIDOCAINE 2 PATCH: 560 PATCH PERCUTANEOUS; TOPICAL; TRANSDERMAL at 08:26

## 2021-05-08 RX ADMIN — Medication 10 MG: at 21:56

## 2021-05-08 RX ADMIN — HYDROMORPHONE HYDROCHLORIDE 0.4 MG: 1 INJECTION, SOLUTION INTRAMUSCULAR; INTRAVENOUS; SUBCUTANEOUS at 08:21

## 2021-05-08 RX ADMIN — PREDNISONE 50 MG: 20 TABLET ORAL at 08:24

## 2021-05-08 RX ADMIN — HEPARIN SODIUM 5000 UNITS: 5000 INJECTION, SOLUTION INTRAVENOUS; SUBCUTANEOUS at 11:38

## 2021-05-08 RX ADMIN — VANCOMYCIN HYDROCHLORIDE 1000 MG: 1 INJECTION, SOLUTION INTRAVENOUS at 22:29

## 2021-05-08 RX ADMIN — FLUTICASONE PROPIONATE AND SALMETEROL XINAFOATE 2 PUFF: 115; 21 AEROSOL, METERED RESPIRATORY (INHALATION) at 19:57

## 2021-05-08 RX ADMIN — BENZOCAINE, MENTHOL 1 LOZENGE: 15; 3.6 LOZENGE ORAL at 17:39

## 2021-05-08 RX ADMIN — MYCOPHENOLATE MOFETIL 1500 MG: 250 CAPSULE ORAL at 08:23

## 2021-05-08 RX ADMIN — FUROSEMIDE 80 MG: 10 INJECTION, SOLUTION INTRAVENOUS at 03:36

## 2021-05-08 RX ADMIN — GABAPENTIN 600 MG: 300 CAPSULE ORAL at 21:56

## 2021-05-08 RX ADMIN — PREDNISONE 45 MG: 5 TABLET ORAL at 19:25

## 2021-05-08 RX ADMIN — INSULIN ASPART 3 UNITS: 100 INJECTION, SOLUTION INTRAVENOUS; SUBCUTANEOUS at 17:42

## 2021-05-08 RX ADMIN — Medication 37.5 MG: at 21:55

## 2021-05-08 RX ADMIN — HEPARIN SODIUM 5000 UNITS: 5000 INJECTION, SOLUTION INTRAVENOUS; SUBCUTANEOUS at 19:50

## 2021-05-08 RX ADMIN — IPRATROPIUM BROMIDE AND ALBUTEROL SULFATE 3 ML: .5; 3 SOLUTION RESPIRATORY (INHALATION) at 12:14

## 2021-05-08 RX ADMIN — ISOPROTERENOL HYDROCHLORIDE 0.01 MCG/KG/MIN: 0.2 INJECTION, SOLUTION INTRAMUSCULAR; INTRAVENOUS at 16:29

## 2021-05-08 RX ADMIN — ALLOPURINOL 300 MG: 300 TABLET ORAL at 08:24

## 2021-05-08 RX ADMIN — SENNOSIDES AND DOCUSATE SODIUM 2 TABLET: 8.6; 5 TABLET ORAL at 19:24

## 2021-05-08 RX ADMIN — INSULIN ASPART 3 UNITS: 100 INJECTION, SOLUTION INTRAVENOUS; SUBCUTANEOUS at 11:45

## 2021-05-08 RX ADMIN — HEPARIN SODIUM 5000 UNITS: 5000 INJECTION, SOLUTION INTRAVENOUS; SUBCUTANEOUS at 03:36

## 2021-05-08 RX ADMIN — EPINEPHRINE 0.01 MCG/KG/MIN: 1 INJECTION PARENTERAL at 21:21

## 2021-05-08 RX ADMIN — CYCLOBENZAPRINE 5 MG: 5 TABLET, FILM COATED ORAL at 21:55

## 2021-05-08 RX ADMIN — FLUTICASONE PROPIONATE AND SALMETEROL XINAFOATE 2 PUFF: 115; 21 AEROSOL, METERED RESPIRATORY (INHALATION) at 08:27

## 2021-05-08 RX ADMIN — BENZOCAINE, MENTHOL 1 LOZENGE: 15; 3.6 LOZENGE ORAL at 11:48

## 2021-05-08 RX ADMIN — OXYCODONE HYDROCHLORIDE 5 MG: 5 TABLET ORAL at 19:24

## 2021-05-08 RX ADMIN — ACETAMINOPHEN 975 MG: 325 TABLET, FILM COATED ORAL at 06:08

## 2021-05-08 ASSESSMENT — ACTIVITIES OF DAILY LIVING (ADL)
ADLS_ACUITY_SCORE: 15
ADLS_ACUITY_SCORE: 15
IADL_COMMENTS: SEE INITIAL EVAL.
ADLS_ACUITY_SCORE: 15

## 2021-05-08 ASSESSMENT — MIFFLIN-ST. JEOR: SCORE: 1845.5

## 2021-05-08 NOTE — PROGRESS NOTES
Major Shift Events:  Pt A/Ox4, follows commands and HEAD. Pain tolerable with scheduled PO tylenol, lido patches and IV dilaudid. On home CPAP with 2L bleed, sats  overnight, lungs clear/diminished. Meds/CT continuing to have 40-60ml/hr output. 2200 SVO2 dropped to 33 from 46, CVP elevated 17 from previous 13, hgb 6.8. CVTS crosscover notified, ordered for 1u PRBC. RN requested dose of lasix d/t rise in CVP, but MD wanted to see what #'s were after transfusion. CVP remained 17 so 80mg IV lasix given with good response. Epi weaned to 0.04, norepi weaned off, Isoprel remains @ 0.01, -112 overnight.     Plan: Continue to optimize pt with pulmonary toilet, mobilization and medication weaning.    For vital signs and complete assessments, please see documentation flowsheets.

## 2021-05-08 NOTE — PLAN OF CARE
Major Shift Events:  epi decreased. MD would like to decrease by 0.01 every 6 hours until off, next  after MICHELLE numbers at 2000. MD would like CI> 2 CVP elevated after getting to bed, after settling down, recheck was 12. MD was going to give diuretics but then decided to just monitor due to decrease in CVP. Good urine output. Dim appetite, pt says his throat is sore, lozenges given, helps a little. Pt encouraged to order something for dinner. Wife at bedside. Pt up in the chair most of the day.    Plan: decrease epi, monitor MICHELLE numbers. Encourage therapy. Transfer to floor once stable  For vital signs and complete assessments, please see documentation flowsheets.

## 2021-05-08 NOTE — PHARMACY-VANCOMYCIN DOSING SERVICE
Pharmacy Vancomycin Note  Date of Service May 7, 2021  Patient's  1955   65 year old, male    Indication: Post-op prophylaxis  Day of Therapy: 2  Current vancomycin regimen:  intermittent  Current vancomycin monitoring method: Trough (Method 2 = manual dose calculation)  Current vancomycin therapeutic monitoring goal: 15-20 mg/L    Current estimated CrCl = Estimated Creatinine Clearance: 33.9 mL/min (A) (based on SCr of 2.66 mg/dL (H)).    Creatinine for last 3 days  2021: 11:13 PM Creatinine 1.60 mg/dL  2021:  6:28 AM Creatinine 1.59 mg/dL  2021:  6:50 AM Creatinine 1.69 mg/dL; 10:02 AM Creatinine 1.83 mg/dL;  3:35 PM Creatinine 2.22 mg/dL;  9:53 PM Creatinine 2.41 mg/dL  2021:  3:40 AM Creatinine 2.56 mg/dL;  9:53 AM Creatinine 2.64 mg/dL;  5:01 PM Creatinine 2.66 mg/dL    Recent Vancomycin Levels (past 3 days)  2021:  5:01 PM Vancomycin Level 16.0 mg/L    Vancomycin IV Administrations (past 72 hours)                   vancomycin 1500 mg in 0.9% NaCl 250 ml intermittent infusion 1,500 mg (mg) 1,500 mg New Bag 21 1713    vancomycin 1500 mg in 0.9% NaCl 250 ml intermittent infusion 1,500 mg (g) 1.5 g Given 21 2310                Nephrotoxins and other renal medications (From now, onward)    Start     Dose/Rate Route Frequency Ordered Stop    21 0630  vancomycin place mandel - receiving intermittent dosing      1 each Intravenous SEE ADMIN INSTRUCTIONS 21 0631      21 1700  [Held by provider]  vancomycin 1500 mg in 0.9% NaCl 250 ml intermittent infusion 1,500 mg     (Held by provider since 2021 at 0632 by Ronnie Quigley MD.Hold Reason: Acute Kidney Injury.)    1,500 mg  over 90 Minutes Intravenous EVERY 18 HOURS 21 0649 21 1659    21 0700  norepinephrine (LEVOPHED) 16 mg in  mL infusion CENTRAL LINE      0.03-0.4 mcg/kg/min × 102.3 kg  2.9-38.4 mL/hr  Intravenous CONTINUOUS 21 0668               Contrast Orders - past  72 hours (72h ago, onward)    None          Interpretation of levels and current regimen:  Vancomycin level is reflective of therapeutic level    Has serum creatinine changed greater than 50% in last 72 hours: Yes  Urine output:  diminished urine output/still adequate  Renal Function: Worsening      Plan:  1. Redose this evening with Vancomycin 1500 mg iv x 1, then continue with intermittent dosing  2. Vancomycin monitoring method: Trough (Method 2 = manual dose calculation)  3. Vancomycin therapeutic monitoring goal: 15-20 mg/L  4. Pharmacy will check vancomycin levels as appropriate in 1-3 Days.  5. Serum creatinine levels will be ordered daily for the first week of therapy and at least twice weekly for subsequent weeks.    Lyndsay Castillo, RonalD

## 2021-05-08 NOTE — PROGRESS NOTES
[facility]  CARDIOLOGY HEART FAILURE SERVICE (CARDS II) PROGRESS NOTE    Patient Name: Jim Willingham    Medical Record Number: [id number]    YOB: 1955  PCP: Ricardo Gómez    Admit Date/Time: 2/8/2021  2:55 PM   89    Assessment and Plan:  Mr. Jim Willingham is a 65 year old male with history of NICM EF 20% c/b VT s/p CRT-D s/p HMII LVAD 6/19/2017 originally intended as destination therapy 2/2 obesity however with recent weight loss now candidate for transplantation. He is admitted for worsening functional status and renal function concerning for worsening heart failure. Listed status 2E for transplant s/p OHT 5/6/2021 5/8/2021: CVP 11 PA 34/18 PCW 12 SvO2 43% CO/CI 5.8/2.2  PVR 2.58    Today's plan:   - slow wean of epinephrine as tolerated  - start terbutaline 10mg tid  - stop isoproterenol if HR < 100 can restart  - monitor hemodynamics Q4h  - continue prednisone taper  - Basiliximab 20 mg IV, second dose due 5/10/2021  - Endomyocardial biopsy: first 5/13/2021    #S/p OHT  #Chronic systolic heart failure secondary to NICM s/p HM II LVAD.   Pressors: on levophed, epinephrine   Inhaled agents: epoprostenol  Other drips: isoproterenol, propofol, insulin, fentanyl   Epicardial leads in place, currently  with isoproterenol   Hemodynamics this AM: CVP 13, PA 23/17, PCWP 14, SvO2 58% ROBERTO/CI 6/2.7    Serostatus: Donor unknown  CMV: D?, R+, EBV: R+, Toxo R-  Blood type: O+     Immunosuppression:  -Calcineurin Inhibitor: due to GFR 30-59, anti-IL-2 given - Basiliximab 20 mg IV , second dose due 5/10/2021; tacrolimus to follow pending renal function with goal level 10-15 for first 3 months  -Cell cycle Inhibitor: start Cellcept 1500 mg BID (generic 1500 mg given preoperatively)  -Steroid: methylprednisolone 1 gram given preoperatively, followed by 500 mg IV at release of cross clamp; start 125 mg IV q8 hours x 3 doses tomorrow morning, followed by prednisone 1  mg/kg daily (in BID dosing, taper 5 mg daily)  -Endomyocardial biopsy: first 5/13/2021 ; weekly 4 weeks, bi-weekly 2 months, monthly 3-6 months     Infection Prophylaxis:  -PCP/Toxo: Dapsone (sulfa allergy)  -Thrush: Nystatin  -CMV (D/R-): will start Valcyte pending renal function; 3-6 months total (pending donor status)   -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 Renu Sears MD, attending physician, who agrees with the assessment and plan above.     Karthik Wilson MD  Cardiology Fellow    Interval Changes in Past 24 Hours:   No acute events overnight. Yesterday epoprostenol weaned and patient extubated 2L NC. Off of norepinephrine. Maintained on 0.04 epinephrine. Yesterday requiried 1U prbcs for Hb 6.9 > 8.0. Patient awake, alert, responding to questions appropriately. Denies uncontrolled pain, SOB, lightheadedness, dizziness, fevers, chills.     Review Of Systems  A 4-point ROS was negative aside from those listed above.    OBJECTIVE FINDINGS:    Temp:  [96.8  F (36  C)-98.4  F (36.9  C)] 97.5  F (36.4  C)  Pulse:  [] 106  Resp:  [14-20] 18  BP: ()/(58-87) 92/66  MAP:  [50 mmHg-100 mmHg] 74 mmHg  Arterial Line BP: ()/(39-78) 110/57  SpO2:  [91 %-100 %] 93 %    Gen: Patient is awake, alert, sitting in chair in NAD.    HEENT: PERRLA, EOMI, MMM  Neck: JVP estimated at 10cm  Resp: clear to auscultation bilaterally, no crackles or wheezing   CV: RRR, no murmurs appreciated  Abd: soft, NT, ND, no hepatosplenomegaly, normal BS  Ext: warm and well perfused, no LE edema    Lines, Tubes, and Devices:  Vascular Access:   - RIJ 9Fr MAC sheath, PA catheter (Insertion date: 5/6/2021)  - Right radial arterial line (Insertion date: 5/6/2021)  Tubes:  - Russell cath 5/6/2021  - ETT (Insertion date 5/6/2021)  - Chest tubes, x1 pericardial, x1 mediastinal    Devices:    Invasive Hemodynamic Monitoring:  CVP: 11  PCWP: 10  PAP:  26/14(18)  Mixed venous O2 sat: 72%  Estimated CO/ CI: 7.1/3.2  CVP 11, PA 26/14 (18), PCWP 10  Intake/Output Summary (Last 24 hours) at 5/6/2021 0644  Last data filed at 5/6/2021 0553  Gross per 24 hour   Intake 3745 ml   Output 2250 ml   Net 1495 ml     Wt Readings from Last 5 Encounters:   05/08/21 107.6 kg (237 lb 3.4 oz)   04/27/21 102.5 kg (226 lb)   04/21/21 103 kg (227 lb)   04/14/21 101.2 kg (223 lb)   04/07/21 99.8 kg (220 lb)       Current medications     acetaminophen  975 mg Oral Q8H     allopurinol  300 mg Oral Daily     amitriptyline  37.5 mg Oral At Bedtime     aspirin  81 mg Oral or Feeding Tube Daily     bumetanide  2 mg Intravenous Once     buPROPion  100 mg Oral BID     ceFEPIme (MAXIPIME) IV  2 g Intravenous Q24H     [START ON 5/11/2021] dapsone  100 mg Oral Daily    Or     [START ON 5/11/2021] dapsone  100 mg Oral or NG Tube Daily     docusate  100 mg Oral BID     fluticasone-salmeterol  2 puff Inhalation Q12H     gabapentin  300 mg Oral Daily     gabapentin  600 mg Oral BID     heparin ANTICOAGULANT  5,000 Units Subcutaneous Q8H     insulin aspart  1-7 Units Subcutaneous TID AC     insulin aspart  1-5 Units Subcutaneous At Bedtime     ipratropium - albuterol 0.5 mg/2.5 mg/3 mL  3 mL Nebulization 4x daily     lidocaine  2 patch Transdermal Q24H     lidocaine   Transdermal Q8H     montelukast  10 mg Oral At Bedtime     mycophenolate  1,500 mg Oral BID     [START ON 5/11/2021] nystatin  1,000,000 Units Oral 4x Daily     pantoprazole  40 mg Oral QAM AC     polyethylene glycol  17 g Oral Daily     predniSONE  45 mg Oral or NG Tube BID    Followed by     [START ON 5/10/2021] predniSONE  40 mg Oral or NG Tube BID    Followed by     [START ON 5/12/2021] predniSONE  35 mg Oral or NG Tube BID    Followed by     [START ON 5/14/2021] predniSONE  30 mg Oral or NG Tube BID    Followed by     [START ON 5/16/2021] predniSONE  25 mg Oral or NG Tube BID    Followed by     [START ON 5/18/2021] predniSONE  20 mg  Oral or NG Tube BID     senna-docusate  1 tablet Oral BID     sodium chloride (PF)  3 mL Intracatheter Q8H     terbutaline  10 mg Oral Q8H ELI     [START ON 5/11/2021] valGANciclovir  450 mg Oral Daily    Or     [START ON 5/11/2021] valGANciclovir  450 mg Oral or NG Tube Daily     [Held by provider] vancomycin (VANCOCIN) IV  1,500 mg Intravenous Q18H     vancomycin place mandel - receiving intermittent dosing  1 each Intravenous See Admin Instructions       dextrose       DOBUTamine       EPINEPHrine 0.02 mcg/kg/min (05/08/21 1500)     isoproterenol (ISUPREL) infusion ADULT 0.01 mcg/kg/min (05/08/21 1500)     - MEDICATION INSTRUCTIONS -       norepinephrine Stopped (05/08/21 0220)     - MEDICATION INSTRUCTIONS -       ACE/ARB/ARNI NOT PRESCRIBED       [START ON 5/9/2021] acetaminophen, albuterol, alum & mag hydroxide-simethicone, sore throat lozenge, bisacodyl, capsaicin, cyclobenzaprine, dextrose, glucose **OR** dextrose **OR** glucagon, diclofenac, HYDROmorphone **OR** HYDROmorphone, hydrOXYzine, lidocaine 4%, lidocaine, lidocaine (buffered or not buffered), magnesium hydroxide, - MEDICATION INSTRUCTIONS -, melatonin, menthol (Topical Analgesic) 2.5%, naloxone **OR** naloxone **OR** naloxone **OR** naloxone, nitroGLYcerin, ondansetron **OR** ondansetron, oxyCODONE, - MEDICATION INSTRUCTIONS -, polyethylene glycol, prochlorperazine **OR** prochlorperazine, ACE/ARB/ARNI NOT PRESCRIBED, senna-docusate, simethicone, sodium chloride (PF), sodium chloride (PF), sodium chloride (PF), sodium phosphate, traMADol    LABS Reviewed  IMAGES Reviewed

## 2021-05-08 NOTE — PROGRESS NOTES
05/08/21 0900   Quick Adds   Type of Visit Occupational Therapy Re-evaluation   Living Environment   Living Environment Comments see initial eval.    Self-Care   Usual Activity Tolerance fair   Current Activity Tolerance fair   Regular Exercise Yes   Activity/Exercise Type   (CR this hospital stay)   Exercise Amount/Frequency daily   Disability/Function   Hearing Difficulty or Deaf no   Wear Glasses or Blind no   Fall history within last six months yes   Number of times patient has fallen within last six months 1   Change in Functional Status Since Onset of Current Illness/Injury yes   General Information   Referring Physician Ronit Rogers   Patient/Family Therapy Goal Statement (OT) return to PLOF and take of grand daughter.    Additional Occupational Profile Info/Pertinent History of Current Problem Jim Willingham is a 65 year old male with pmhx of T2DM, CKD, COPD, CECILIA (cpap), history of NICM s/p Hm2 LVAD, afib who is now s/p heart transplant with Dr. Quigley on 5/6/2021. Intraoperatively, he received 1uPRBC, 2 Plt, 2 fibryga, 2,000 Kcentra. Ischemic time was 157 min. Cross clamp time was 98 min. Coming off bypass, he was shocked > 2x received amio, mag and lidocaine.    Existing Precautions/Restrictions sternal   General Observations and Info pt motivated in therapy.    Cognitive Status Examination   Orientation Status orientation to person, place and time   Cognitive Status Comments no concerns, will continue to monitor   Visual Perception   Visual Impairment/Limitations WFL   Sensory   Sensory Quick Adds No deficits were identified   Sensory Comments pt with B CT at baseline. retains protective sensaiton in B hands.    Range of Motion Comprehensive   General Range of Motion no range of motion deficits identified   Strength Comprehensive (MMT)   Comment, General Manual Muscle Testing (MMT) Assessment pt overall deconditioned.    Coordination   Coordination Comments pt with gross motor deficits in B LE's  appropriate for post op arena.    Bed Mobility   Bed Mobility supine-sit   Supine-Sit [place] (Bed Mobility) maximum assist (25% patient effort)   Transfers   Transfers bed-chair transfer;sit-stand transfer   Transfer Skill: Bed to Chair/Chair to Bed   Bed-Chair [place] (Transfers) minimum assist (75% patient effort)   Sit-Stand Transfer   Sit-Stand [place] (Transfers) minimum assist (75% patient effort)   Balance   Balance Assessment standing balance: dynamic   Standing Balance: Dynamic fair balance   Instrumental Activities of Daily Living (IADL)   IADL Comments see initial eval.    Clinical Impression   Criteria for Skilled Therapeutic Interventions Met (OT) yes   OT Diagnosis decreased ADL I   Assessment of Occupational Performance 5 or more Performance Deficits   Identified Performance Deficits dressing, bathing, toileting, G/H, home making, leisure.    Planned Therapy Interventions (OT) ADL retraining;IADL retraining;bed mobility training;cognition;ROM;strengthening;transfer training;home program guidelines;progressive activity/exercise;risk factor education   Clinical Decision Making Complexity (OT) low complexity   Therapy Frequency (OT) 5x/week   Predicted Duration of Therapy 3 weeks   Risk & Benefits of therapy have been explained evaluation/treatment results reviewed;care plan/treatment goals reviewed;risks/benefits reviewed;current/potential barriers reviewed;participants voiced agreement with care plan;participants included;patient   Comment-Clinical Impression pt presents to OT with post surgical precautions and generla deconditioning leading to decreased ADL I. Pt to benefit from skilled OT intervention to address the above problem list.    OT Discharge Planning    OT Discharge Recommendation (DC Rec) Transitional Care Facility;Acute Rehab Center-Motivated patient will benefit from intensive, interdisciplinary therapy.  Anticipate will be able to tolerate 3 hours of therapy per day   OT  Rationale for DC Rec pt expected to progress well however precautions and deconditioning limiting ADL I   OT Brief overview of current status  min assist pivot to chair, max assist bed mobility and LE dressing.    Total Evaluation Time (Minutes)   Total Evaluation Time (Minutes) 5

## 2021-05-08 NOTE — PROGRESS NOTES
SPIRITUAL HEALTH SERVICES  SPIRITUAL ASSESSMENT Progress Note  [facility] ([place]) 4E     REFERRAL SOURCE: I have followed pt on 6C throughout this admission. This was my first visit on 4E post transplant.     Visited in morning with pt and spouse Cate while pt still on ventilator but waking up. Introduced myself to spouse, offered prayer for pt. Visited with pt and spouse again late afternoon - pt extubated, awake, grateful for successful surgery. Both pt and spouse very pleased. Prayer was welcomed as always with pt. Pt especially grateful for opportunity to talk with granddaughter by phone.    PLAN: continue to follow, will visit again early next week.    Parviz Smith M.Div. (Bill), [facility]  Staff   Pager [phone]      * [facility] remains available 24/7 for emergent requests/referrals, either by having the switchboard page the on-call  or by entering an ASAP/STAT consult in Epic (this will also page the on-call ). Routine Epic consults receive an initial response within 24 hours.*

## 2021-05-08 NOTE — PLAN OF CARE
Deferral - SLP orders received for swallow eval s/p extubation and heart transplant. Chart reviewed and discussed with RN. Per RN, pt passed nursing swallow screen and is currently on a regular diet/thin liquids. SLP will defer swallow eval and complete orders. Please reconsult SLP with changes in swallow function or communication skills.

## 2021-05-08 NOTE — PROGRESS NOTES
CVICU PROGRESS NOTE  5/8/2021      Date of Service (when I saw the patient): 5/8/2021    ASSESSMENT: Jim Willingham is a 65 year old male with pmhx of T2DM, CKD, COPD, CECILIA (cpap), history of NICM s/p Hm2 LVAD, afib who is now s/p heart transplant with Dr. Quigley on 5/6/2021. Intraoperatively, he received 1uPRBC, 2 Plt, 2 fibryga, 2,000 Kcentra. Ischemic time was 157 min. Cross clamp time was 98 min. Coming off bypass, he was shocked > 2x received amio, mag and lidocaine.     CHANGES FOR TODAY:  MDSSI  Dobutamine straight rate 2.5  Wean Epi   Advance diet as tolerated  Decrease repetitive labs    PLAN:  Neuro:  # Post-operative pain and sedation  - PTA gabapentin 300mg daily, 600mg BID  - Hydromorphone, oxycodone available prn PO  - Lidocaine patches, scheduled acetaminophen    # Depression  - Continue PTA bupropion, amitriptyline     Pulmonary:  # COPD  # CECILIA uses cpap   # Post op ventilatory support, extubated  - BIPAP overnight  - PTA Trelegy ellipta on hold  - Advair HFA, duonebs  - Continue PTA montelukast     Cardiovascular:   # History of NICM s/p HM2 LVAD now s/p heart transplant with Dr. Quigley on 5/6/21   # Cardiogenic shock  - Inotropy: Epinephrine MAP >65, adding dobutamine to wean epinephrine   - Chronotropy: isoproterenol  - Arterial line for BP monitoring  - Resume PTA ASA  - Temporary pacemaker, backup rate 100  - Immunosuppression per cardiology: Basiliximab dosed 5/4, methylprednisolone 125 mg q8 hours completed, MMF 1500 mg BID  - Infections ppx: Valganciclovir and Dapsone to begin 5/11, nystatin QID     GI/Nutrition:   # Nutrition  - advance diet as tolerated   - Bowel regimen: scheduled senna, docusate, Miralax     Fluids/Electrolytes/Renal:  # Lactic acidosis, improved  # WALTER on CKD3, baseline Cr 1.5-1.7  - Russell in place for strict I/O monitoring     Endocrine:  # T2DM , stress hyperglycemia   # Hx of gout   - Insulin gtt  - Continue PTA allopurinol  - Holding PTA acarbose     ID:  #  Perioperative antibiotics  - Complete perioperative antibiotics (vancomycin and cefepime x5 days)    # Leukocytosis, improving  WBC 12 today --> 13.2 monitor      Heme:  # Acute blood loss anemia   - Hgb goal >7  - No sign of bleeding     Prophylaxis:    - Mechanical prophylaxis for DVT, subQ heparin   - Protonix daily     Lines/ tubes/ drains:  - MAC  - Stanton  - arterial line  - christopher     Disposition:  - CV ICU    Patient seen, findings and plan discussed with surgical ICU staff, Dr. Rodriguez.    Todd Orellana [facility]     ====================================  INTERVAL HISTORY:   1 uPRBC overnight. 80 of lasix. Sitting in chair this morning. Doing well. Pain well managed on current regimen.    OBJECTIVE:   1. VITAL SIGNS:   Temp:  [96.8  F (36  C)-100.4  F (38  C)] 96.8  F (36  C)  Pulse:  [101-116] 101  Resp:  [11-20] 16  BP: ()/(58-87) 160/87  MAP:  [50 mmHg-100 mmHg] 66 mmHg  Arterial Line BP: ()/(39-78) 89/55  FiO2 (%):  [40 %] 40 %  SpO2:  [91 %-100 %] 95 %    2. INTAKE/ OUTPUT:   I/O last 3 completed shifts:  In: 1994.61 [I.V.:1594.61; NG/GT:100]  Out: 3235 [Urine:2435; Chest Tube:800]    3. PHYSICAL EXAMINATION:  General: NAD, sitting in chair   HEENT: Mucous membranes moist  Neuro: Follows commands in all extremities  Pulm/Resp: no distress  CV: tachycardic, regular   Abdomen: Soft, non-distended, non-tender  : Christopher catheter in place, urine yellow and clear  Incisions/Skin: Midline incision covered with wound vac    4. INVESTIGATIONS:   Arterial Blood Gases   Recent Labs   Lab 05/07/21  1900 05/07/21  1700 05/07/21  1420 05/07/21  0340   PH 7.40 7.38 7.39 7.38   PCO2 38 41 40 41   PO2 101 102 130* 153*   HCO3 23 24 24 24     Complete Blood Count   Recent Labs   Lab 05/08/21  0340 05/07/21 2226 05/07/21  1701 05/07/21  0953   WBC 13.2* 12.2* 11.0 13.3*   HGB 7.6* 6.9* 7.4* 8.0*   * 144* 150 152     Basic Metabolic Panel  Recent Labs   Lab 05/08/21  0340 05/07/21 2226 05/07/21  1701  05/07/21  0953    138 138 137   POTASSIUM 4.2 4.2 4.1 4.7   CHLORIDE 105 106 105 104   CO2 21 24 23 25   BUN 82* 75* 74* 73*   CR 2.53* 2.44* 2.66* 2.64*   * 100* 118* 161*     Liver Function Tests  Recent Labs   Lab 05/08/21  0340 05/07/21  2226 05/07/21  1701 05/07/21  0953 05/07/21  0340 05/06/21  0650 05/06/21  0426 05/05/21  1503   AST 54* 65* 71* 81* 97* Canceled, Test credited  --   --    ALT 33 31 32 32 31 30  --   --    ALKPHOS 48 44 46 45 46 60  --   --    BILITOTAL 0.9 0.9 0.7 0.6 0.6 0.8  --   --    ALBUMIN 2.9* 2.8* 2.9* 3.0* 3.1* 2.7*  --   --    INR  --   --   --   --  1.38* 1.45* 2.02* 1.49*     Pancreatic Enzymes  Recent Labs   Lab 05/04/21  2313   AMYLASE 93     Coagulation Profile  Recent Labs   Lab 05/07/21  0340 05/06/21  0650 05/06/21  0426 05/05/21  1503 05/04/21  2313 05/04/21  2313   INR 1.38* 1.45* 2.02* 1.49*   < > 2.00*   PTT  --  35 26  --   --  37    < > = values in this interval not displayed.       5. RADIOLOGY:   Recent Results (from the past 24 hour(s))   XR Chest Port 1 View    Impression    RESIDENT PRELIMINARY INTERPRETATION  Impression:   1. Increased hazy left basilar pulmonary opacity favored to represent  atelectasis and/or layering pleural effusion.  2. Stable position of support devices.       =========================================

## 2021-05-09 ENCOUNTER — APPOINTMENT (OUTPATIENT)
Dept: OCCUPATIONAL THERAPY | Facility: CLINIC | Age: 66
DRG: 001 | End: 2021-05-09
Attending: INTERNAL MEDICINE
Payer: COMMERCIAL

## 2021-05-09 ENCOUNTER — APPOINTMENT (OUTPATIENT)
Dept: GENERAL RADIOLOGY | Facility: CLINIC | Age: 66
DRG: 001 | End: 2021-05-09
Attending: STUDENT IN AN ORGANIZED HEALTH CARE EDUCATION/TRAINING PROGRAM
Payer: COMMERCIAL

## 2021-05-09 ENCOUNTER — APPOINTMENT (OUTPATIENT)
Dept: CARDIOLOGY | Facility: CLINIC | Age: 66
DRG: 001 | End: 2021-05-09
Attending: INTERNAL MEDICINE
Payer: COMMERCIAL

## 2021-05-09 ENCOUNTER — APPOINTMENT (OUTPATIENT)
Dept: GENERAL RADIOLOGY | Facility: CLINIC | Age: 66
DRG: 001 | End: 2021-05-09
Attending: INTERNAL MEDICINE
Payer: COMMERCIAL

## 2021-05-09 LAB
ALBUMIN SERPL-MCNC: 2.9 G/DL (ref 3.4–5)
ALP SERPL-CCNC: 74 U/L (ref 40–150)
ALT SERPL W P-5'-P-CCNC: 49 U/L (ref 0–70)
ANION GAP SERPL CALCULATED.3IONS-SCNC: 12 MMOL/L (ref 3–14)
ANION GAP SERPL CALCULATED.3IONS-SCNC: 8 MMOL/L (ref 3–14)
AST SERPL W P-5'-P-CCNC: 41 U/L (ref 0–45)
BASE DEFICIT BLDV-SCNC: 0.9 MMOL/L
BASE DEFICIT BLDV-SCNC: 1.3 MMOL/L
BASE DEFICIT BLDV-SCNC: 1.6 MMOL/L
BASE DEFICIT BLDV-SCNC: 5.7 MMOL/L
BILIRUB SERPL-MCNC: 0.7 MG/DL (ref 0.2–1.3)
BLD PROD TYP BPU: NORMAL
BLD UNIT ID BPU: 0
BLOOD PRODUCT CODE: NORMAL
BPU ID: NORMAL
BUN SERPL-MCNC: 93 MG/DL (ref 7–30)
BUN SERPL-MCNC: 96 MG/DL (ref 7–30)
CALCIUM SERPL-MCNC: 7.7 MG/DL (ref 8.5–10.1)
CALCIUM SERPL-MCNC: 7.9 MG/DL (ref 8.5–10.1)
CHLORIDE SERPL-SCNC: 102 MMOL/L (ref 94–109)
CHLORIDE SERPL-SCNC: 103 MMOL/L (ref 94–109)
CO2 SERPL-SCNC: 22 MMOL/L (ref 20–32)
CO2 SERPL-SCNC: 24 MMOL/L (ref 20–32)
CREAT SERPL-MCNC: 2.14 MG/DL (ref 0.66–1.25)
CREAT SERPL-MCNC: 2.29 MG/DL (ref 0.66–1.25)
ERYTHROCYTE [DISTWIDTH] IN BLOOD BY AUTOMATED COUNT: 15.5 % (ref 10–15)
ERYTHROCYTE [DISTWIDTH] IN BLOOD BY AUTOMATED COUNT: 15.9 % (ref 10–15)
GFR SERPL CREATININE-BSD FRML MDRD: 29 ML/MIN/{1.73_M2}
GFR SERPL CREATININE-BSD FRML MDRD: 32 ML/MIN/{1.73_M2}
GLUCOSE BLDC GLUCOMTR-MCNC: 208 MG/DL (ref 70–99)
GLUCOSE BLDC GLUCOMTR-MCNC: 216 MG/DL (ref 70–99)
GLUCOSE BLDC GLUCOMTR-MCNC: 241 MG/DL (ref 70–99)
GLUCOSE BLDC GLUCOMTR-MCNC: 253 MG/DL (ref 70–99)
GLUCOSE BLDC GLUCOMTR-MCNC: 282 MG/DL (ref 70–99)
GLUCOSE SERPL-MCNC: 250 MG/DL (ref 70–99)
GLUCOSE SERPL-MCNC: 263 MG/DL (ref 70–99)
HCO3 BLDV-SCNC: 20 MMOL/L (ref 21–28)
HCO3 BLDV-SCNC: 24 MMOL/L (ref 21–28)
HCO3 BLDV-SCNC: 25 MMOL/L (ref 21–28)
HCO3 BLDV-SCNC: 25 MMOL/L (ref 21–28)
HCT VFR BLD AUTO: 26.4 % (ref 40–53)
HCT VFR BLD AUTO: 26.6 % (ref 40–53)
HGB BLD-MCNC: 8.7 G/DL (ref 13.3–17.7)
HGB BLD-MCNC: 8.8 G/DL (ref 13.3–17.7)
MCH RBC QN AUTO: 29.6 PG (ref 26.5–33)
MCH RBC QN AUTO: 30.1 PG (ref 26.5–33)
MCHC RBC AUTO-ENTMCNC: 33 G/DL (ref 31.5–36.5)
MCHC RBC AUTO-ENTMCNC: 33.1 G/DL (ref 31.5–36.5)
MCV RBC AUTO: 90 FL (ref 78–100)
MCV RBC AUTO: 91 FL (ref 78–100)
O2/TOTAL GAS SETTING VFR VENT: 21 %
O2/TOTAL GAS SETTING VFR VENT: NORMAL %
OXYHGB MFR BLDV: 53 %
OXYHGB MFR BLDV: 54 %
OXYHGB MFR BLDV: 59 %
OXYHGB MFR BLDV: 61 %
PCO2 BLDV: 36 MM HG (ref 40–50)
PCO2 BLDV: 43 MM HG (ref 40–50)
PCO2 BLDV: 45 MM HG (ref 40–50)
PCO2 BLDV: 47 MM HG (ref 40–50)
PH BLDV: 7.34 PH (ref 7.32–7.43)
PH BLDV: 7.35 PH (ref 7.32–7.43)
PLATELET # BLD AUTO: 118 10E9/L (ref 150–450)
PLATELET # BLD AUTO: 125 10E9/L (ref 150–450)
PO2 BLDV: 32 MM HG (ref 25–47)
PO2 BLDV: 34 MM HG (ref 25–47)
PO2 BLDV: 35 MM HG (ref 25–47)
PO2 BLDV: 35 MM HG (ref 25–47)
POTASSIUM SERPL-SCNC: 3.8 MMOL/L (ref 3.4–5.3)
POTASSIUM SERPL-SCNC: 3.9 MMOL/L (ref 3.4–5.3)
PROT SERPL-MCNC: 5.1 G/DL (ref 6.8–8.8)
RBC # BLD AUTO: 2.89 10E12/L (ref 4.4–5.9)
RBC # BLD AUTO: 2.97 10E12/L (ref 4.4–5.9)
SODIUM SERPL-SCNC: 134 MMOL/L (ref 133–144)
SODIUM SERPL-SCNC: 136 MMOL/L (ref 133–144)
TRANSFUSION STATUS PATIENT QL: NORMAL
TRANSFUSION STATUS PATIENT QL: NORMAL
UFH PPP CHRO-ACNC: <0.1 IU/ML
WBC # BLD AUTO: 10.3 10E9/L (ref 4–11)
WBC # BLD AUTO: 10.6 10E9/L (ref 4–11)

## 2021-05-09 PROCEDURE — 250N000013 HC RX MED GY IP 250 OP 250 PS 637: Performed by: NURSE PRACTITIONER

## 2021-05-09 PROCEDURE — 71045 X-RAY EXAM CHEST 1 VIEW: CPT | Mod: 26 | Performed by: RADIOLOGY

## 2021-05-09 PROCEDURE — 999N000157 HC STATISTIC RCP TIME EA 10 MIN

## 2021-05-09 PROCEDURE — P9016 RBC LEUKOCYTES REDUCED: HCPCS | Performed by: INTERNAL MEDICINE

## 2021-05-09 PROCEDURE — 85027 COMPLETE CBC AUTOMATED: CPT | Performed by: STUDENT IN AN ORGANIZED HEALTH CARE EDUCATION/TRAINING PROGRAM

## 2021-05-09 PROCEDURE — 82805 BLOOD GASES W/O2 SATURATION: CPT | Performed by: SURGERY

## 2021-05-09 PROCEDURE — 999N001017 HC STATISTIC GLUCOSE BY METER IP

## 2021-05-09 PROCEDURE — 97535 SELF CARE MNGMENT TRAINING: CPT | Mod: GO | Performed by: OCCUPATIONAL THERAPIST

## 2021-05-09 PROCEDURE — 93308 TTE F-UP OR LMTD: CPT | Mod: 26 | Performed by: INTERNAL MEDICINE

## 2021-05-09 PROCEDURE — 250N000011 HC RX IP 250 OP 636: Performed by: NURSE PRACTITIONER

## 2021-05-09 PROCEDURE — 250N000013 HC RX MED GY IP 250 OP 250 PS 637: Performed by: SURGERY

## 2021-05-09 PROCEDURE — 255N000002 HC RX 255 OP 636: Performed by: INTERNAL MEDICINE

## 2021-05-09 PROCEDURE — 250N000012 HC RX MED GY IP 250 OP 636 PS 637: Performed by: SURGERY

## 2021-05-09 PROCEDURE — 250N000013 HC RX MED GY IP 250 OP 250 PS 637: Performed by: STUDENT IN AN ORGANIZED HEALTH CARE EDUCATION/TRAINING PROGRAM

## 2021-05-09 PROCEDURE — 200N000002 HC R&B ICU UMMC

## 2021-05-09 PROCEDURE — 250N000011 HC RX IP 250 OP 636: Performed by: STUDENT IN AN ORGANIZED HEALTH CARE EDUCATION/TRAINING PROGRAM

## 2021-05-09 PROCEDURE — 250N000009 HC RX 250: Performed by: STUDENT IN AN ORGANIZED HEALTH CARE EDUCATION/TRAINING PROGRAM

## 2021-05-09 PROCEDURE — 250N000012 HC RX MED GY IP 250 OP 636 PS 637: Performed by: STUDENT IN AN ORGANIZED HEALTH CARE EDUCATION/TRAINING PROGRAM

## 2021-05-09 PROCEDURE — 80053 COMPREHEN METABOLIC PANEL: CPT | Performed by: STUDENT IN AN ORGANIZED HEALTH CARE EDUCATION/TRAINING PROGRAM

## 2021-05-09 PROCEDURE — 85520 HEPARIN ASSAY: CPT | Performed by: STUDENT IN AN ORGANIZED HEALTH CARE EDUCATION/TRAINING PROGRAM

## 2021-05-09 PROCEDURE — 999N000155 HC STATISTIC RAPCV CVP MONITORING

## 2021-05-09 PROCEDURE — 250N000011 HC RX IP 250 OP 636: Performed by: SURGERY

## 2021-05-09 PROCEDURE — 999N000208 ECHOCARDIOGRAM LIMITED

## 2021-05-09 PROCEDURE — 97530 THERAPEUTIC ACTIVITIES: CPT | Mod: GO | Performed by: OCCUPATIONAL THERAPIST

## 2021-05-09 PROCEDURE — 93325 DOPPLER ECHO COLOR FLOW MAPG: CPT | Mod: 26 | Performed by: INTERNAL MEDICINE

## 2021-05-09 PROCEDURE — 93321 DOPPLER ECHO F-UP/LMTD STD: CPT | Mod: 26 | Performed by: INTERNAL MEDICINE

## 2021-05-09 PROCEDURE — 999N000065 XR CHEST PORT 1 VIEW

## 2021-05-09 PROCEDURE — 80048 BASIC METABOLIC PNL TOTAL CA: CPT | Performed by: STUDENT IN AN ORGANIZED HEALTH CARE EDUCATION/TRAINING PROGRAM

## 2021-05-09 PROCEDURE — 99291 CRITICAL CARE FIRST HOUR: CPT | Mod: 25 | Performed by: INTERNAL MEDICINE

## 2021-05-09 PROCEDURE — 71045 X-RAY EXAM CHEST 1 VIEW: CPT | Mod: 26 | Performed by: STUDENT IN AN ORGANIZED HEALTH CARE EDUCATION/TRAINING PROGRAM

## 2021-05-09 PROCEDURE — 36569 INSJ PICC 5 YR+ W/O IMAGING: CPT

## 2021-05-09 PROCEDURE — 71045 X-RAY EXAM CHEST 1 VIEW: CPT

## 2021-05-09 PROCEDURE — 999N000045 HC STATISTIC DAILY SWAN MONITORING

## 2021-05-09 PROCEDURE — 99233 SBSQ HOSP IP/OBS HIGH 50: CPT | Mod: 24 | Performed by: ANESTHESIOLOGY

## 2021-05-09 PROCEDURE — 999N000015 HC STATISTIC ARTERIAL MONITORING DAILY

## 2021-05-09 RX ORDER — LIDOCAINE 40 MG/G
CREAM TOPICAL
Status: ACTIVE | OUTPATIENT
Start: 2021-05-09 | End: 2021-05-12

## 2021-05-09 RX ORDER — POTASSIUM CHLORIDE 750 MG/1
10 TABLET, EXTENDED RELEASE ORAL ONCE
Status: COMPLETED | OUTPATIENT
Start: 2021-05-09 | End: 2021-05-09

## 2021-05-09 RX ORDER — NICOTINE POLACRILEX 4 MG
15-30 LOZENGE BUCCAL
Status: DISCONTINUED | OUTPATIENT
Start: 2021-05-09 | End: 2021-05-31 | Stop reason: HOSPADM

## 2021-05-09 RX ORDER — POLYETHYLENE GLYCOL 3350 17 G/17G
17 POWDER, FOR SOLUTION ORAL 2 TIMES DAILY
Status: DISCONTINUED | OUTPATIENT
Start: 2021-05-09 | End: 2021-05-31 | Stop reason: HOSPADM

## 2021-05-09 RX ORDER — BUPROPION HYDROCHLORIDE 100 MG/1
100 TABLET, EXTENDED RELEASE ORAL 2 TIMES DAILY
Status: DISCONTINUED | OUTPATIENT
Start: 2021-05-09 | End: 2021-05-25

## 2021-05-09 RX ORDER — DAPSONE 25 MG/1
50 TABLET ORAL DAILY
Status: DISCONTINUED | OUTPATIENT
Start: 2021-05-11 | End: 2021-05-21

## 2021-05-09 RX ORDER — BUMETANIDE 0.25 MG/ML
1 INJECTION INTRAMUSCULAR; INTRAVENOUS EVERY 6 HOURS
Status: COMPLETED | OUTPATIENT
Start: 2021-05-09 | End: 2021-05-09

## 2021-05-09 RX ORDER — HEPARIN SODIUM,PORCINE 10 UNIT/ML
2-5 VIAL (ML) INTRAVENOUS
Status: DISCONTINUED | OUTPATIENT
Start: 2021-05-09 | End: 2021-05-11

## 2021-05-09 RX ORDER — DEXTROSE MONOHYDRATE 25 G/50ML
25-50 INJECTION, SOLUTION INTRAVENOUS
Status: DISCONTINUED | OUTPATIENT
Start: 2021-05-09 | End: 2021-05-31 | Stop reason: HOSPADM

## 2021-05-09 RX ADMIN — POLYETHYLENE GLYCOL 3350 17 G: 17 POWDER, FOR SOLUTION ORAL at 07:43

## 2021-05-09 RX ADMIN — ACETAMINOPHEN 975 MG: 325 TABLET, FILM COATED ORAL at 05:53

## 2021-05-09 RX ADMIN — DOCUSATE SODIUM 50 MG AND SENNOSIDES 8.6 MG 2 TABLET: 8.6; 5 TABLET, FILM COATED ORAL at 20:51

## 2021-05-09 RX ADMIN — OXYCODONE HYDROCHLORIDE 5 MG: 5 TABLET ORAL at 07:45

## 2021-05-09 RX ADMIN — DOCUSATE SODIUM 100 MG: 100 CAPSULE, LIQUID FILLED ORAL at 07:45

## 2021-05-09 RX ADMIN — TERBUTALINE SULFATE 10 MG: 5 TABLET ORAL at 20:53

## 2021-05-09 RX ADMIN — Medication 37.5 MG: at 21:10

## 2021-05-09 RX ADMIN — TERBUTALINE SULFATE 10 MG: 5 TABLET ORAL at 11:46

## 2021-05-09 RX ADMIN — OXYCODONE HYDROCHLORIDE 5 MG: 5 TABLET ORAL at 21:10

## 2021-05-09 RX ADMIN — HEPARIN SODIUM 5000 UNITS: 5000 INJECTION, SOLUTION INTRAVENOUS; SUBCUTANEOUS at 03:14

## 2021-05-09 RX ADMIN — GABAPENTIN 600 MG: 300 CAPSULE ORAL at 14:39

## 2021-05-09 RX ADMIN — BENZOCAINE, MENTHOL 1 LOZENGE: 15; 3.6 LOZENGE ORAL at 07:45

## 2021-05-09 RX ADMIN — MYCOPHENOLATE MOFETIL 1500 MG: 250 CAPSULE ORAL at 20:50

## 2021-05-09 RX ADMIN — PANTOPRAZOLE SODIUM 40 MG: 40 TABLET, DELAYED RELEASE ORAL at 07:45

## 2021-05-09 RX ADMIN — HUMAN ALBUMIN MICROSPHERES AND PERFLUTREN 6 ML: 10; .22 INJECTION, SOLUTION INTRAVENOUS at 08:31

## 2021-05-09 RX ADMIN — BUMETANIDE 1 MG: 0.25 INJECTION INTRAMUSCULAR; INTRAVENOUS at 10:35

## 2021-05-09 RX ADMIN — BUPROPION HYDROCHLORIDE 100 MG: 100 TABLET, EXTENDED RELEASE ORAL at 20:53

## 2021-05-09 RX ADMIN — LIDOCAINE 2 PATCH: 560 PATCH PERCUTANEOUS; TOPICAL; TRANSDERMAL at 07:46

## 2021-05-09 RX ADMIN — ALLOPURINOL 300 MG: 300 TABLET ORAL at 07:45

## 2021-05-09 RX ADMIN — INSULIN HUMAN 10 UNITS: 100 INJECTION, SUSPENSION SUBCUTANEOUS at 11:46

## 2021-05-09 RX ADMIN — HYDROMORPHONE HYDROCHLORIDE 0.4 MG: 1 INJECTION, SOLUTION INTRAMUSCULAR; INTRAVENOUS; SUBCUTANEOUS at 17:00

## 2021-05-09 RX ADMIN — ASPIRIN 81 MG CHEWABLE TABLET 81 MG: 81 TABLET CHEWABLE at 07:45

## 2021-05-09 RX ADMIN — BUPROPION HYDROCHLORIDE 100 MG: 100 TABLET, FILM COATED ORAL at 07:45

## 2021-05-09 RX ADMIN — TERBUTALINE SULFATE 10 MG: 5 TABLET ORAL at 03:14

## 2021-05-09 RX ADMIN — INSULIN HUMAN 10 UNITS: 100 INJECTION, SUSPENSION SUBCUTANEOUS at 17:14

## 2021-05-09 RX ADMIN — Medication 10 MG: at 21:10

## 2021-05-09 RX ADMIN — MONTELUKAST 10 MG: 10 TABLET, FILM COATED ORAL at 21:10

## 2021-05-09 RX ADMIN — BENZOCAINE, MENTHOL 1 LOZENGE: 15; 3.6 LOZENGE ORAL at 05:58

## 2021-05-09 RX ADMIN — HEPARIN SODIUM 5000 UNITS: 5000 INJECTION, SOLUTION INTRAVENOUS; SUBCUTANEOUS at 11:46

## 2021-05-09 RX ADMIN — TOPICAL ANESTHETIC 0.5 ML: 200 SPRAY DENTAL; PERIODONTAL at 10:41

## 2021-05-09 RX ADMIN — BUMETANIDE 1 MG: 0.25 INJECTION INTRAMUSCULAR; INTRAVENOUS at 17:15

## 2021-05-09 RX ADMIN — DOCUSATE SODIUM 100 MG: 100 CAPSULE, LIQUID FILLED ORAL at 20:53

## 2021-05-09 RX ADMIN — HEPARIN SODIUM 5000 UNITS: 5000 INJECTION, SOLUTION INTRAVENOUS; SUBCUTANEOUS at 20:50

## 2021-05-09 RX ADMIN — FLUTICASONE PROPIONATE AND SALMETEROL XINAFOATE 2 PUFF: 115; 21 AEROSOL, METERED RESPIRATORY (INHALATION) at 07:46

## 2021-05-09 RX ADMIN — HYDROMORPHONE HYDROCHLORIDE 0.2 MG: 0.2 INJECTION, SOLUTION INTRAMUSCULAR; INTRAVENOUS; SUBCUTANEOUS at 03:14

## 2021-05-09 RX ADMIN — SENNOSIDES AND DOCUSATE SODIUM 1 TABLET: 8.6; 5 TABLET ORAL at 07:45

## 2021-05-09 RX ADMIN — PREDNISONE 45 MG: 5 TABLET ORAL at 20:53

## 2021-05-09 RX ADMIN — MYCOPHENOLATE MOFETIL 1500 MG: 250 CAPSULE ORAL at 07:43

## 2021-05-09 RX ADMIN — POTASSIUM CHLORIDE 10 MEQ: 750 TABLET, EXTENDED RELEASE ORAL at 05:53

## 2021-05-09 RX ADMIN — CEFEPIME HYDROCHLORIDE 2 G: 2 INJECTION, POWDER, FOR SOLUTION INTRAVENOUS at 20:42

## 2021-05-09 RX ADMIN — ACETAMINOPHEN 975 MG: 325 TABLET, FILM COATED ORAL at 14:38

## 2021-05-09 RX ADMIN — UMECLIDINIUM 1 PUFF: 62.5 AEROSOL, POWDER ORAL at 10:39

## 2021-05-09 RX ADMIN — PREDNISONE 45 MG: 5 TABLET ORAL at 07:45

## 2021-05-09 RX ADMIN — GABAPENTIN 300 MG: 300 CAPSULE ORAL at 07:45

## 2021-05-09 RX ADMIN — ACETAMINOPHEN 975 MG: 325 TABLET, FILM COATED ORAL at 21:09

## 2021-05-09 RX ADMIN — ALBUTEROL SULFATE 2 PUFF: 90 AEROSOL, METERED RESPIRATORY (INHALATION) at 14:34

## 2021-05-09 RX ADMIN — GABAPENTIN 600 MG: 300 CAPSULE ORAL at 21:10

## 2021-05-09 ASSESSMENT — ACTIVITIES OF DAILY LIVING (ADL)
ADLS_ACUITY_SCORE: 14
ADLS_ACUITY_SCORE: 15

## 2021-05-09 ASSESSMENT — MIFFLIN-ST. JEOR: SCORE: 1865.5

## 2021-05-09 NOTE — PLAN OF CARE
Major Shift Events:  pt had PICC placed. Removed all other invasive lines, order to 6C. Pt walked in ayers and was up in the chair for a few hours. Russell removed around 1400.    Plan: transfer to 6C when bed becomes available.  For vital signs and complete assessments, please see documentation flowsheets.

## 2021-05-09 NOTE — PHARMACY-VANCOMYCIN DOSING SERVICE
Pharmacy Vancomycin Note  Date of Service May 8, 2021  Patient's  1955   65 year old, male    Indication: Post-op prophylaxis  Day of Therapy: 3  Current vancomycin regimen: intermittent  Current vancomycin monitoring method: Trough (Method 2 = manual dose calculation)  Current vancomycin therapeutic monitoring goal: 15-20 mg/L    Current estimated CrCl = Estimated Creatinine Clearance: 35.5 mL/min (A) (based on SCr of 2.53 mg/dL (H)).    Creatinine for last 3 days  2021:  6:50 AM Creatinine 1.69 mg/dL; 10:02 AM Creatinine 1.83 mg/dL;  3:35 PM Creatinine 2.22 mg/dL;  9:53 PM Creatinine 2.41 mg/dL  2021:  3:40 AM Creatinine 2.56 mg/dL;  9:53 AM Creatinine 2.64 mg/dL;  5:01 PM Creatinine 2.66 mg/dL; 10:26 PM Creatinine 2.44 mg/dL  2021:  3:40 AM Creatinine 2.53 mg/dL    Recent Vancomycin Levels (past 3 days)  2021:  5:01 PM Vancomycin Level 16.0 mg/L  2021:  8:04 PM Vancomycin Level 18.5 mg/L    Vancomycin IV Administrations (past 72 hours)                   vancomycin 1500 mg in 0.9% NaCl 250 ml intermittent infusion 1,500 mg (mg) 1,500 mg New Bag 21    vancomycin 1500 mg in 0.9% NaCl 250 ml intermittent infusion 1,500 mg (mg) 1,500 mg New Bag 21 1713    vancomycin 1500 mg in 0.9% NaCl 250 ml intermittent infusion 1,500 mg (g) 1.5 g Given 21 2310                Nephrotoxins and other renal medications (From now, onward)    Start     Dose/Rate Route Frequency Ordered Stop    21 0630  vancomycin place mandel - receiving intermittent dosing      1 each Intravenous SEE ADMIN INSTRUCTIONS 21 0631      21 1700  [Held by provider]  vancomycin 1500 mg in 0.9% NaCl 250 ml intermittent infusion 1,500 mg     (Held by provider since 2021 at 0632 by Ronnie Quigley MD.Hold Reason: Acute Kidney Injury.)    1,500 mg  over 90 Minutes Intravenous EVERY 18 HOURS 21 0649 21 1659    21 0700  norepinephrine (LEVOPHED) 16 mg in  mL  infusion CENTRAL LINE      0.03-0.4 mcg/kg/min × 102.3 kg  2.9-38.4 mL/hr  Intravenous CONTINUOUS 05/06/21 0649               Contrast Orders - past 72 hours (72h ago, onward)    None          Interpretation of levels and current regimen:  Vancomycin level is reflective of therapeutic level    Has serum creatinine changed greater than 50% in last 72 hours: No  Urine output:  diminished urine output  Renal Function: WALTER    Plan:     Redose with Vancomycin 1000 mg iv x 1 then continue w/intermittent dosing  1. Vancomycin monitoring method: Trough (Method 2 = manual dose calculation)  2. Vancomycin therapeutic monitoring goal: 15-20 mg/L  3. Pharmacy will check vancomycin levels as appropriate in 1-3 Days.  4. Serum creatinine levels will be ordered daily for the first week of therapy and at least twice weekly for subsequent weeks.    Lyndsay Castillo, PharmD

## 2021-05-09 NOTE — PROGRESS NOTES
CVICU PROGRESS NOTE  5/9/2021      Date of Service (when I saw the patient): 5/9/2021    ASSESSMENT: Jim Willingham is a 65 year old male with pmhx of T2DM, CKD, COPD, CECILIA (cpap), history of NICM s/p Hm2 LVAD, afib who is now s/p heart transplant with Dr. Quigley on 5/6/2021. Intraoperatively, he received 1uPRBC, 2 Plt, 2 fibryga, 2,000 Kcentra. Ischemic time was 157 min. Cross clamp time was 98 min. Coming off bypass, he was shocked > 2x received amio, mag and lidocaine.     CHANGES FOR TODAY:  - discontinue isoprel   - continue terbutaline  - bumex 1 x2  - repeat bmp 1400  - switch to High sliding scale insulin  - Add NPH 10 bid  - remove A line, Ngozi and christopher  - Add PICC  - Add hurricane spray     PLAN:  Neuro:  # Post-operative pain and sedation  - PTA gabapentin 300mg daily, 600mg BID  - Hydromorphone, oxycodone available prn PO  - Lidocaine patches, scheduled acetaminophen    # Depression  - Continue PTA bupropion, amitriptyline     Pulmonary:  # COPD  # CECILIA uses cpap   # Post op ventilatory support, extubated  - BIPAP overnight  - PTA Trelegy ellipta on hold  - Advair HFA, duonebs  - Continue PTA montelukast     Cardiovascular:   # History of NICM s/p HM2 LVAD now s/p heart transplant with Dr. Quigley on 5/6/21   # Cardiogenic shock  - Inotropy: Epinephrine MAP >65  - Chronotropy: isoproterenol stopped 5/9. Start terbutaline 5/8  - Resume PTA ASA  - Temporary pacemaker, backup rate 100  - Immunosuppression per cardiology: Basiliximab dosed 5/4, methylprednisolone 125 mg q8 hours completed, MMF 1500 mg BID  - Infections ppx: Valganciclovir and Dapsone to begin 5/11, nystatin QID     GI/Nutrition:   # Nutrition  - advance diet as tolerated   - Bowel regimen: scheduled senna, docusate, Miralax     Fluids/Electrolytes/Renal:  # Lactic acidosis, improved  # WALTER on CKD3, baseline Cr 1.5-1.7  - remove christopher     Endocrine:  # T2DM , stress hyperglycemia   # Hx of gout   - High sliding scale insulin. Add NPH 10 units  bid  - Continue PTA allopurinol  - Holding PTA acarbose     ID:  # Perioperative antibiotics  - Complete perioperative antibiotics (vancomycin and cefepime x5 days)    # Leukocytosis, improving  WBC normal      Heme:  # Acute blood loss anemia   - Hgb goal >7  - No sign of bleeding     Prophylaxis:    - Mechanical prophylaxis for DVT, subQ heparin   - Protonix daily     Lines/ tubes/ drains:  - PICC today    Disposition:  - transfer to floor    Patient seen, findings and plan discussed with surgical ICU staff, Dr. Rodriguez.    Radha Partida MD  General Surgery PGY-3  [phone]  ===================================  INTERVAL HISTORY:   hgb 6.9 and got 2 units overnight. Repeat hgb 8.8. complains of sore throat.     OBJECTIVE:   1. VITAL SIGNS:   Temp:  [96.8  F (36  C)-98.2  F (36.8  C)] 97.9  F (36.6  C)  Pulse:  [] 100  Resp:  [14-18] 18  BP: (92)/(66) 92/66  MAP:  [60 mmHg-89 mmHg] 80 mmHg  Arterial Line BP: ()/(18-73) 115/69  SpO2:  [92 %-99 %] 95 %    2. INTAKE/ OUTPUT:   I/O last 3 completed shifts:  In: 3143 [P.O.:1680; I.V.:863]  Out: 2480 [Urine:1960; Chest Tube:520]    3. PHYSICAL EXAMINATION:  General: NAD, sitting in chair   HEENT: Mucous membranes moist  Neuro: Follows commands in all extremities  Pulm/Resp: no distress  CV: tachycardic, regular   Abdomen: Soft, non-distended, non-tender  : Russell catheter in place, urine yellow and clear  Incisions/Skin: Midline incision covered with wound vac    4. INVESTIGATIONS:   Arterial Blood Gases   Recent Labs   Lab 05/07/21  1900 05/07/21  1700 05/07/21  1420 05/07/21  0340   PH 7.40 7.38 7.39 7.38   PCO2 38 41 40 41   PO2 101 102 130* 153*   HCO3 23 24 24 24     Complete Blood Count   Recent Labs   Lab 05/09/21 0344 05/08/21 2147 05/08/21 0340 05/07/21 2226   WBC 10.3 10.4 13.2* 12.2*   HGB 8.8* 6.9* 7.6* 6.9*   * 119* 129* 144*     Basic Metabolic Panel  Recent Labs   Lab 05/09/21  0344 05/08/21  2004 05/08/21  0340 05/07/21 2226  05/07/21  1701     --  137 138 138   POTASSIUM 3.8 4.0 4.2 4.2 4.1   CHLORIDE 103  --  105 106 105   CO2 22  --  21 24 23   BUN 93*  --  82* 75* 74*   CR 2.29*  --  2.53* 2.44* 2.66*   *  --  145* 100* 118*     Liver Function Tests  Recent Labs   Lab 05/09/21  0344 05/08/21  0340 05/07/21  2226 05/07/21  1701 05/07/21  0340 05/07/21  0340 05/06/21  0650 05/06/21  0426 05/05/21  1503   AST 41 54* 65* 71*   < > 97* Canceled, Test credited  --   --    ALT 49 33 31 32   < > 31 30  --   --    ALKPHOS 74 48 44 46   < > 46 60  --   --    BILITOTAL 0.7 0.9 0.9 0.7   < > 0.6 0.8  --   --    ALBUMIN 2.9* 2.9* 2.8* 2.9*   < > 3.1* 2.7*  --   --    INR  --   --   --   --   --  1.38* 1.45* 2.02* 1.49*    < > = values in this interval not displayed.     Pancreatic Enzymes  Recent Labs   Lab 05/04/21  2313   AMYLASE 93     Coagulation Profile  Recent Labs   Lab 05/07/21  0340 05/06/21  0650 05/06/21  0426 05/05/21  1503 05/04/21  2313 05/04/21  2313   INR 1.38* 1.45* 2.02* 1.49*   < > 2.00*   PTT  --  35 26  --   --  37    < > = values in this interval not displayed.       5. RADIOLOGY:   Recent Results (from the past 24 hour(s))   XR Chest Port 1 View    Narrative    Exam: XR CHEST PORT 1 VIEW, 5/9/2021 4:38 AM    Indication: s/p heart txp    Comparison: Chest x-ray 5/8/2021    Findings:   Semiupright AP view of the chest. Right internal jugular [place]-Aashish  catheter terminates overlying the right pulmonary artery. Left basilar  chest tube appears stable in position. Mediastinal drain position  appears unchanged. Retained pacemaker lead is again noted extending  from the left clavicle to the high SVC. Median sternotomy wires in  place.    Bilateral streaky perihilar opacities suggesting atelectasis appear  similar. Left basilar pulmonary opacity appears similar. No  pneumothorax. Small left pleural effusion appears stable. Cardiac  silhouette is unchanged in size.      Impression    Impression:   1. Stable appearance  of left basilar pulmonary opacity representing  atelectasis and/or pleural effusion.  2. Stable position of support devices.    I have personally reviewed the examination and initial interpretation  and I agree with the findings.    MICHAEL FERNANDEZ MD   Echo Limited    Narrative    [id number]  [id number]  [id number]  [id number]^[email or web address]^SHERRY     [facility],[place]  Echocardiography Laboratory  [address] [place] [address]     Name: LOIS FULLER  MRN: [id number]  : 1955  Study Date: 2021 08:24 AM  Age: 65 yrs  Gender: Male  Patient Location: [facility]  Reason For Study: Transplant - Heart  Ordering Physician: SHERRY RAMIREZ  Referring Physician: CATALINA ODELL  Performed By: Delmi Darby RDCS     BSA: 2.2 m2  Height: 68 in  Weight: 241 lb  HR: 107  ______________________________________________________________________________  Procedure  Limited Portable Echo Adult. Contrast Optison. Technically difficult study.  Optison (NDC #1190-2490-99) given intravenously. Patient was given 6 ml  mixture of 3 ml Optison and 6 ml saline. 3 ml wasted.  ______________________________________________________________________________  Interpretation Summary  Technically difficult study.  Post OHT 21  Global and regional left ventricular function is hyperkinetic with an EF of  65-70%.  Global right ventricular function is mildly reduced.  No valvular abnormalities on Doppler assessment.  Dilation of the inferior vena cava is present with abnormal respiratory  variation in diameter.  No pericardial effusion is present.  ______________________________________________________________________________  Left Ventricle  Left ventricular size is normal. Global and regional left ventricular function  is hyperkinetic with an EF of 65-70%.     Right Ventricle  The right ventricle is normal size. Global right ventricular function is  mildly reduced.     Atria  The atria cannot be  assessed.     Mitral Valve  On Doppler interrogation, there is no significant stenosis or regurgitation.     Aortic Valve  On Doppler interrogation, there is no significant stenosis or regurgitation.     Tricuspid Valve  On Doppler interrogation, there is no significant stenosis or regurgitation.     Pulmonic Valve  The pulmonic valve cannot be assessed.     Vessels  Dilation of the inferior vena cava is present with abnormal respiratory  variation in diameter. IVC diameter >2.1 cm collapsing <50% with sniff  suggests a high RA pressure estimated at 15 mmHg or greater.     Pericardium  No pericardial effusion is present.     ______________________________________________________________________________  Report approved by: Shayne Page 05/09/2021 10:47 AM     ______________________________________________________________________________          =========================================

## 2021-05-09 NOTE — PROGRESS NOTES
[facility]  CARDIOLOGY HEART FAILURE SERVICE (CARDS II) PROGRESS NOTE    Patient Name: Jim Willingham    Medical Record Number: [id number]    YOB: 1955  PCP: Ricardo Gómez    Admit Date/Time: 2/8/2021  2:55 PM   90    Assessment and Plan:  Mr. Jim Willingham is a 65 year old male with history of NICM EF 20% c/b VT s/p CRT-D s/p HMII LVAD 6/19/2017 originally intended as destination therapy 2/2 obesity however with recent weight loss now candidate for transplantation. He is admitted for worsening functional status and renal function concerning for worsening heart failure. Listed status 2E for transplant s/p OHT 5/6/2021 5/8/2021: CVP 11 PA 34/18 PCW 12 SvO2 43% CO/CI 5.8/2.2    5/9/2021: CVP 16 PA 34/24 PCW 19 SvO2 62% CO/CI 5.8/2.8     Today's plan:   - slow wean of epinephrine as tolerated  - continue terbutaline 10mg tid  - stop isoproterenol if HR < 100 can restart   - monitor hemodynamics Q4h  - continue prednisone taper  - Basiliximab 20 mg IV, second dose due 5/10/2021  - Endomyocardial biopsy: first 5/13/2021  - okay to go to floor    #S/p OHT  #Chronic systolic heart failure secondary to NICM s/p HM II LVAD.   Pressors: on levophed, epinephrine   Inhaled agents: epoprostenol  Other drips: isoproterenol, propofol, insulin, fentanyl   Epicardial leads in place, currently  with isoproterenol   Hemodynamics this AM: CVP 16, PA 34/24, PCWP 19, SvO2 62% ROBERTO/CI 5.8/2.8     Serostatus: Donor unknown  CMV: D?, R+, EBV: R+, Toxo R-  Blood type: O+     Immunosuppression:  -Calcineurin Inhibitor: due to GFR 30-59, anti-IL-2 given - Basiliximab 20 mg IV , second dose due 5/10/2021; tacrolimus to follow pending renal function with goal level 10-15 for first 3 months  -Cell cycle Inhibitor: start Cellcept 1500 mg BID (generic 1500 mg given preoperatively)  -Steroid: methylprednisolone 1 gram given preoperatively, followed by 500 mg IV at release of  cross clamp; start 125 mg IV q8 hours x 3 doses tomorrow morning, followed by prednisone 1 mg/kg daily (in BID dosing, taper 5 mg daily)  -Endomyocardial biopsy: first 5/13/2021 ; weekly 4 weeks, bi-weekly 2 months, monthly 3-6 months     Infection Prophylaxis:  -PCP/Toxo: Dapsone (sulfa allergy)  -Thrush: Nystatin  -CMV (D/R-): will start Valcyte pending renal function; 3-6 months total (pending donor status)   -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 Renu Sears MD, attending physician, who agrees with the assessment and plan above.     Karthik Wilson MD  Cardiology Fellow    Interval Changes in Past 24 Hours:   No acute events overnight. Off of pressors. Isoproterenol held this AM. Yesterday required 1U prbcs for Hb 6.9 > 8.8. Patient awake, alert, responding to questions appropriately. Denies uncontrolled pain, SOB, lightheadedness, dizziness, fevers, chills.     Review Of Systems  A 4-point ROS was negative aside from those listed above.    OBJECTIVE FINDINGS:    Temp:  [96.8  F (36  C)-98.2  F (36.8  C)] 98  F (36.7  C)  Pulse:  [] 99  Resp:  [14-18] 18  BP: (95)/(67) 95/67  MAP:  [60 mmHg-87 mmHg] 80 mmHg  Arterial Line BP: ()/(18-73) 115/69  SpO2:  [92 %-99 %] 94 %    Gen: Patient is awake, alert, sitting in chair in NAD.    HEENT: PERRLA, EOMI, MMM  Neck: JVP estimated at 10cm  Resp: clear to auscultation bilaterally, no crackles or wheezing   CV: RRR, no murmurs appreciated  Abd: soft, NT, ND, no hepatosplenomegaly, normal BS  Ext: warm and well perfused, no LE edema    Lines, Tubes, and Devices:  Vascular Access:   - RIJ 9Fr MAC sheath, PA catheter (Insertion date: 5/6/2021)  - Right radial arterial line (Insertion date: 5/6/2021)  Tubes:  - Russell cath 5/6/2021  - ETT (Insertion date 5/6/2021)  - Chest tubes, x1 pericardial, x1 mediastinal    Devices:    Invasive Hemodynamic Monitoring:  CVP: 11  PCWP: 10  PAP:  26/14(18)  Mixed venous O2 sat: 72%  Estimated CO/ CI: 7.1/3.2  CVP 11, PA 26/14 (18), PCWP 10  Intake/Output Summary (Last 24 hours) at 5/6/2021 0644  Last data filed at 5/6/2021 0553  Gross per 24 hour   Intake 3745 ml   Output 2250 ml   Net 1495 ml     Wt Readings from Last 5 Encounters:   05/09/21 109.6 kg (241 lb 10 oz)   04/27/21 102.5 kg (226 lb)   04/21/21 103 kg (227 lb)   04/14/21 101.2 kg (223 lb)   04/07/21 99.8 kg (220 lb)       Current medications     acetaminophen  975 mg Oral Q8H     allopurinol  300 mg Oral Daily     amitriptyline  37.5 mg Oral At Bedtime     aspirin  81 mg Oral or Feeding Tube Daily     bumetanide  1 mg Intravenous Q6H     buPROPion  100 mg Oral BID     ceFEPIme (MAXIPIME) IV  2 g Intravenous Q24H     [START ON 5/11/2021] dapsone  50 mg Oral Daily    Or     [START ON 5/11/2021] dapsone  50 mg Oral or NG Tube Daily     docusate sodium  100 mg Oral BID     [START ON 5/10/2021] fluticasone-vilanterol  1 puff Inhalation Daily     gabapentin  300 mg Oral Daily     gabapentin  600 mg Oral BID     heparin ANTICOAGULANT  5,000 Units Subcutaneous Q8H     insulin aspart  1-10 Units Subcutaneous TID AC     insulin aspart  1-7 Units Subcutaneous At Bedtime     insulin aspart   Subcutaneous TID w/meals     insulin NPH  10 Units Subcutaneous BID AC     lidocaine  2 patch Transdermal Q24H     lidocaine   Transdermal Q8H     montelukast  10 mg Oral At Bedtime     mycophenolate  1,500 mg Oral BID     [START ON 5/11/2021] nystatin  1,000,000 Units Oral 4x Daily     pantoprazole  40 mg Oral QAM AC     polyethylene glycol  17 g Oral BID     predniSONE  45 mg Oral or NG Tube BID    Followed by     [START ON 5/10/2021] predniSONE  40 mg Oral or NG Tube BID    Followed by     [START ON 5/12/2021] predniSONE  35 mg Oral or NG Tube BID    Followed by     [START ON 5/14/2021] predniSONE  30 mg Oral or NG Tube BID    Followed by     [START ON 5/16/2021] predniSONE  25 mg Oral or NG Tube BID    Followed by      [START ON 5/18/2021] predniSONE  20 mg Oral or NG Tube BID     senna-docusate  1 tablet Oral BID     sodium chloride (PF)  10 mL Intravenous Once     sodium chloride (PF)  3 mL Intracatheter Q8H     terbutaline  10 mg Oral Q8H ELI     umeclidinium  1 puff Inhalation Daily     [START ON 5/11/2021] valGANciclovir  450 mg Oral Daily    Or     [START ON 5/11/2021] valGANciclovir  450 mg Oral or NG Tube Daily     vancomycin place mandel - receiving intermittent dosing  1 each Intravenous See Admin Instructions       dextrose       EPINEPHrine Stopped (05/09/21 0500)     isoproterenol (ISUPREL) infusion ADULT Stopped (05/09/21 1000)     - MEDICATION INSTRUCTIONS -       - MEDICATION INSTRUCTIONS -       ACE/ARB/ARNI NOT PRESCRIBED       acetaminophen, albuterol, alum & mag hydroxide-simethicone, benzocaine 20%, bisacodyl, capsaicin, cyclobenzaprine, dextrose, glucose **OR** dextrose **OR** glucagon, diclofenac, heparin lock flush, HYDROmorphone **OR** HYDROmorphone, hydrOXYzine, ipratropium - albuterol 0.5 mg/2.5 mg/3 mL, lidocaine 4%, lidocaine 4%, lidocaine, lidocaine (buffered or not buffered), lidocaine (buffered or not buffered), magnesium hydroxide, - MEDICATION INSTRUCTIONS -, melatonin, menthol (Topical Analgesic) 2.5%, naloxone **OR** naloxone **OR** naloxone **OR** naloxone, nitroGLYcerin, ondansetron **OR** ondansetron, oxyCODONE, - MEDICATION INSTRUCTIONS -, polyethylene glycol, prochlorperazine **OR** prochlorperazine, ACE/ARB/ARNI NOT PRESCRIBED, senna-docusate, simethicone, sodium chloride (PF), sodium chloride (PF), sodium chloride (PF), sodium chloride (PF), sodium phosphate, traMADol    LABS Reviewed  IMAGES Reviewed

## 2021-05-09 NOTE — PROCEDURES
[facility]    Double Lumen PICC Placement    Date/Time: 5/9/2021 12:12 PM  Performed by: Jessica Roman RN  Authorized by: Ronnie Quigley MD     UNIVERSAL PROTOCOL   Site Marked: Yes  Prior Images Obtained and Reviewed:  Yes  Required items: Required blood products, implants, devices and special equipment available    Patient identity confirmed:  Verbally with patient and arm band  NA - No sedation, light sedation, or local anesthesia  Confirmation Checklist:  Patient's identity using two indicators, relevant allergies, procedure was appropriate and matched the consent or emergent situation and correct equipment/implants were available  Time out: Immediately prior to the procedure a time out was called    Universal Protocol: the Joint Commission Universal Protocol was followed    Preparation: Patient was prepped and draped in usual sterile fashion           ANESTHESIA    Anesthesia: Local infiltration  Local Anesthetic:  Lidocaine 1% without epinephrine  Anesthetic Total (mL):  5      SEDATION    Patient Sedated: No        Skin prep agent: skin prep agent completely dried prior to procedure  Sterile barriers: maximum sterile barriers were used: cap, mask, sterile gown, sterile gloves, and large sterile sheet  Hand hygiene: hand hygiene performed prior to central venous catheter insertion  Type of line used: Power PICC  Catheter type: double lumen  Catheter size: 5 Fr  Brand: Bard  Lot number: [id number]  Placement method: MST, venipuncture and ultrasound  Number of attempts: 1  Successful placement: yes  Orientation: right  Location: brachial vein (medial)  Site rationale: BASILIC VEIN INADEQUATE CATHETER:VEIN RATIO  Visible catheter length: 0  Internal length: 43 cm  Total catheter length: 43  Dressing and securement: blood cleaned with CHG, blood removed, dressing applied and statlock  Post procedure assessment: blood return through all ports and placement verified by  x-ray  PROCEDURE   Patient Tolerance:  Patient tolerated the procedure well with no immediate complications  Describe Procedure: RIGHT PICC PLACED ON FIRST ATTEMPT WITH ULTRASOUND GUIDANCE VIA THE BASILIC VENOUS SYTEM. LINE THREADED WITH EASE AND POSITIVE DARK RED BLOOD RETURN OBSERVED X 2  LUMENS. NAVIGATION SYSTEM INDICATED LINE TOWARDS SVC.  A CHEST X-RAY WAS ORDERED BECAUSE OF THE PATIENT'S CARDIAC HX. FLUSHED AND DRESSED CATHETER PER PROTOCOL. NO OBSERVABLE SIGNS OR SX OF COMPLICATIONS POST INSERTION.  IRENE Jauregui, VA-BC,

## 2021-05-09 NOTE — PLAN OF CARE
Major Shift Events: Took over care for pt at 0100. No acute events overnight. Epi turned off. 0330 Kee CI at 3.28, PA 34/26, CVP 15, Sv02 61. Hgb up to 8.8 after 2 units given overnight. On room air, minimal chest tube output. BG at 263 at 0400. No BM. Adequate urine output with good response to diuresis.  10 meq of K given in AM for K of 3.8.  Plan: Turn of Isoproterenol, continue POC.    For vital signs and complete assessments, please see documentation flowsheets.

## 2021-05-10 ENCOUNTER — DOCUMENTATION ONLY (OUTPATIENT)
Dept: CARDIOLOGY | Facility: CLINIC | Age: 66
End: 2021-05-10

## 2021-05-10 ENCOUNTER — APPOINTMENT (OUTPATIENT)
Dept: OCCUPATIONAL THERAPY | Facility: CLINIC | Age: 66
DRG: 001 | End: 2021-05-10
Attending: INTERNAL MEDICINE
Payer: COMMERCIAL

## 2021-05-10 ENCOUNTER — APPOINTMENT (OUTPATIENT)
Dept: GENERAL RADIOLOGY | Facility: CLINIC | Age: 66
DRG: 001 | End: 2021-05-10
Attending: INTERNAL MEDICINE
Payer: COMMERCIAL

## 2021-05-10 LAB
ALBUMIN SERPL-MCNC: 2.8 G/DL (ref 3.4–5)
ALP SERPL-CCNC: 78 U/L (ref 40–150)
ALT SERPL W P-5'-P-CCNC: 48 U/L (ref 0–70)
ANION GAP SERPL CALCULATED.3IONS-SCNC: 7 MMOL/L (ref 3–14)
AST SERPL W P-5'-P-CCNC: 34 U/L (ref 0–45)
BASOPHILS # BLD AUTO: 0 10E9/L (ref 0–0.2)
BASOPHILS NFR BLD AUTO: 0.1 %
BILIRUB DIRECT SERPL-MCNC: 0.3 MG/DL (ref 0–0.2)
BILIRUB SERPL-MCNC: 0.6 MG/DL (ref 0.2–1.3)
BUN SERPL-MCNC: 90 MG/DL (ref 7–30)
CALCIUM SERPL-MCNC: 8 MG/DL (ref 8.5–10.1)
CHLORIDE SERPL-SCNC: 102 MMOL/L (ref 94–109)
CO2 SERPL-SCNC: 26 MMOL/L (ref 20–32)
CREAT SERPL-MCNC: 1.96 MG/DL (ref 0.66–1.25)
DIFFERENTIAL METHOD BLD: ABNORMAL
EOSINOPHIL # BLD AUTO: 0 10E9/L (ref 0–0.7)
EOSINOPHIL NFR BLD AUTO: 0 %
ERYTHROCYTE [DISTWIDTH] IN BLOOD BY AUTOMATED COUNT: 15.6 % (ref 10–15)
GFR SERPL CREATININE-BSD FRML MDRD: 35 ML/MIN/{1.73_M2}
GLUCOSE BLDC GLUCOMTR-MCNC: 143 MG/DL (ref 70–99)
GLUCOSE BLDC GLUCOMTR-MCNC: 173 MG/DL (ref 70–99)
GLUCOSE BLDC GLUCOMTR-MCNC: 183 MG/DL (ref 70–99)
GLUCOSE BLDC GLUCOMTR-MCNC: 193 MG/DL (ref 70–99)
GLUCOSE BLDC GLUCOMTR-MCNC: 230 MG/DL (ref 70–99)
GLUCOSE SERPL-MCNC: 188 MG/DL (ref 70–99)
HCT VFR BLD AUTO: 25.2 % (ref 40–53)
HGB BLD-MCNC: 8.5 G/DL (ref 13.3–17.7)
IMM GRANULOCYTES # BLD: 0.1 10E9/L (ref 0–0.4)
IMM GRANULOCYTES NFR BLD: 0.6 %
INR PPP: 1.33 (ref 0.86–1.14)
LDH SERPL L TO P-CCNC: 386 U/L (ref 85–227)
LYMPHOCYTES # BLD AUTO: 0.3 10E9/L (ref 0.8–5.3)
LYMPHOCYTES NFR BLD AUTO: 2.4 %
MAGNESIUM SERPL-MCNC: 2.7 MG/DL (ref 1.6–2.3)
MCH RBC QN AUTO: 31.4 PG (ref 26.5–33)
MCHC RBC AUTO-ENTMCNC: 33.7 G/DL (ref 31.5–36.5)
MCV RBC AUTO: 93 FL (ref 78–100)
MONOCYTES # BLD AUTO: 0.7 10E9/L (ref 0–1.3)
MONOCYTES NFR BLD AUTO: 6 %
NEUTROPHILS # BLD AUTO: 9.9 10E9/L (ref 1.6–8.3)
NEUTROPHILS NFR BLD AUTO: 90.9 %
NRBC # BLD AUTO: 0.1 10*3/UL
NRBC BLD AUTO-RTO: 1 /100
PLATELET # BLD AUTO: 114 10E9/L (ref 150–450)
POTASSIUM SERPL-SCNC: 3.8 MMOL/L (ref 3.4–5.3)
PROT SERPL-MCNC: 5 G/DL (ref 6.8–8.8)
RBC # BLD AUTO: 2.71 10E12/L (ref 4.4–5.9)
SODIUM SERPL-SCNC: 134 MMOL/L (ref 133–144)
UFH PPP CHRO-ACNC: <0.1 IU/ML
WBC # BLD AUTO: 10.9 10E9/L (ref 4–11)

## 2021-05-10 PROCEDURE — 250N000013 HC RX MED GY IP 250 OP 250 PS 637: Performed by: STUDENT IN AN ORGANIZED HEALTH CARE EDUCATION/TRAINING PROGRAM

## 2021-05-10 PROCEDURE — 250N000013 HC RX MED GY IP 250 OP 250 PS 637: Performed by: NURSE PRACTITIONER

## 2021-05-10 PROCEDURE — 250N000011 HC RX IP 250 OP 636: Performed by: STUDENT IN AN ORGANIZED HEALTH CARE EDUCATION/TRAINING PROGRAM

## 2021-05-10 PROCEDURE — 85025 COMPLETE CBC W/AUTO DIFF WBC: CPT | Performed by: SURGERY

## 2021-05-10 PROCEDURE — 250N000011 HC RX IP 250 OP 636: Performed by: NURSE PRACTITIONER

## 2021-05-10 PROCEDURE — 999N001017 HC STATISTIC GLUCOSE BY METER IP

## 2021-05-10 PROCEDURE — 71045 X-RAY EXAM CHEST 1 VIEW: CPT

## 2021-05-10 PROCEDURE — 83615 LACTATE (LD) (LDH) ENZYME: CPT | Performed by: SURGERY

## 2021-05-10 PROCEDURE — 250N000011 HC RX IP 250 OP 636: Performed by: SURGERY

## 2021-05-10 PROCEDURE — 85610 PROTHROMBIN TIME: CPT | Performed by: SURGERY

## 2021-05-10 PROCEDURE — 250N000011 HC RX IP 250 OP 636: Performed by: DIETITIAN, REGISTERED

## 2021-05-10 PROCEDURE — 80053 COMPREHEN METABOLIC PANEL: CPT | Performed by: SURGERY

## 2021-05-10 PROCEDURE — 97110 THERAPEUTIC EXERCISES: CPT | Mod: GO

## 2021-05-10 PROCEDURE — 85520 HEPARIN ASSAY: CPT | Performed by: SURGERY

## 2021-05-10 PROCEDURE — 250N000009 HC RX 250: Performed by: STUDENT IN AN ORGANIZED HEALTH CARE EDUCATION/TRAINING PROGRAM

## 2021-05-10 PROCEDURE — 82248 BILIRUBIN DIRECT: CPT | Performed by: SURGERY

## 2021-05-10 PROCEDURE — 214N000001 HC R&B CCU UMMC

## 2021-05-10 PROCEDURE — 258N000003 HC RX IP 258 OP 636: Performed by: STUDENT IN AN ORGANIZED HEALTH CARE EDUCATION/TRAINING PROGRAM

## 2021-05-10 PROCEDURE — 250N000013 HC RX MED GY IP 250 OP 250 PS 637: Performed by: SURGERY

## 2021-05-10 PROCEDURE — 250N000012 HC RX MED GY IP 250 OP 636 PS 637: Performed by: STUDENT IN AN ORGANIZED HEALTH CARE EDUCATION/TRAINING PROGRAM

## 2021-05-10 PROCEDURE — 250N000012 HC RX MED GY IP 250 OP 636 PS 637: Performed by: SURGERY

## 2021-05-10 PROCEDURE — 85025 COMPLETE CBC W/AUTO DIFF WBC: CPT | Performed by: NURSE PRACTITIONER

## 2021-05-10 PROCEDURE — 71045 X-RAY EXAM CHEST 1 VIEW: CPT | Mod: 26 | Performed by: RADIOLOGY

## 2021-05-10 PROCEDURE — 83735 ASSAY OF MAGNESIUM: CPT | Performed by: SURGERY

## 2021-05-10 PROCEDURE — 83615 LACTATE (LD) (LDH) ENZYME: CPT | Performed by: NURSE PRACTITIONER

## 2021-05-10 RX ORDER — BUMETANIDE 0.25 MG/ML
1 INJECTION INTRAMUSCULAR; INTRAVENOUS 2 TIMES DAILY
Status: COMPLETED | OUTPATIENT
Start: 2021-05-10 | End: 2021-05-10

## 2021-05-10 RX ORDER — POTASSIUM CHLORIDE 750 MG/1
20 TABLET, EXTENDED RELEASE ORAL ONCE
Status: COMPLETED | OUTPATIENT
Start: 2021-05-10 | End: 2021-05-10

## 2021-05-10 RX ADMIN — TERBUTALINE SULFATE 10 MG: 5 TABLET ORAL at 20:14

## 2021-05-10 RX ADMIN — INSULIN ASPART 4 UNITS: 100 INJECTION, SOLUTION INTRAVENOUS; SUBCUTANEOUS at 08:34

## 2021-05-10 RX ADMIN — ALLOPURINOL 300 MG: 300 TABLET ORAL at 08:39

## 2021-05-10 RX ADMIN — LIDOCAINE 2 PATCH: 560 PATCH PERCUTANEOUS; TOPICAL; TRANSDERMAL at 08:39

## 2021-05-10 RX ADMIN — TOPICAL ANESTHETIC 1 ML: 200 SPRAY DENTAL; PERIODONTAL at 01:53

## 2021-05-10 RX ADMIN — ACETAMINOPHEN 975 MG: 325 TABLET, FILM COATED ORAL at 20:59

## 2021-05-10 RX ADMIN — UMECLIDINIUM 1 PUFF: 62.5 AEROSOL, POWDER ORAL at 08:40

## 2021-05-10 RX ADMIN — BUMETANIDE 1 MG: 0.25 INJECTION INTRAMUSCULAR; INTRAVENOUS at 15:08

## 2021-05-10 RX ADMIN — DOCUSATE SODIUM 100 MG: 100 CAPSULE, LIQUID FILLED ORAL at 20:15

## 2021-05-10 RX ADMIN — MYCOPHENOLATE MOFETIL 1500 MG: 250 CAPSULE ORAL at 08:38

## 2021-05-10 RX ADMIN — PANTOPRAZOLE SODIUM 40 MG: 40 TABLET, DELAYED RELEASE ORAL at 08:39

## 2021-05-10 RX ADMIN — ASPIRIN 81 MG CHEWABLE TABLET 81 MG: 81 TABLET CHEWABLE at 08:38

## 2021-05-10 RX ADMIN — POTASSIUM CHLORIDE 20 MEQ: 750 TABLET, EXTENDED RELEASE ORAL at 05:06

## 2021-05-10 RX ADMIN — GABAPENTIN 600 MG: 300 CAPSULE ORAL at 15:07

## 2021-05-10 RX ADMIN — BUMETANIDE 1 MG: 0.25 INJECTION INTRAMUSCULAR; INTRAVENOUS at 20:14

## 2021-05-10 RX ADMIN — OXYCODONE HYDROCHLORIDE 5 MG: 5 TABLET ORAL at 20:23

## 2021-05-10 RX ADMIN — FLUTICASONE FUROATE AND VILANTEROL TRIFENATATE 1 PUFF: 100; 25 POWDER RESPIRATORY (INHALATION) at 08:40

## 2021-05-10 RX ADMIN — Medication 10 MG: at 22:21

## 2021-05-10 RX ADMIN — HYDROMORPHONE HYDROCHLORIDE 0.4 MG: 1 INJECTION, SOLUTION INTRAMUSCULAR; INTRAVENOUS; SUBCUTANEOUS at 17:50

## 2021-05-10 RX ADMIN — ALBUTEROL SULFATE 2 PUFF: 90 AEROSOL, METERED RESPIRATORY (INHALATION) at 06:12

## 2021-05-10 RX ADMIN — INSULIN HUMAN 10 UNITS: 100 INJECTION, SUSPENSION SUBCUTANEOUS at 17:59

## 2021-05-10 RX ADMIN — BISACODYL 10 MG: 10 SUPPOSITORY RECTAL at 08:39

## 2021-05-10 RX ADMIN — GABAPENTIN 600 MG: 300 CAPSULE ORAL at 21:00

## 2021-05-10 RX ADMIN — CEFEPIME HYDROCHLORIDE 2 G: 2 INJECTION, POWDER, FOR SOLUTION INTRAVENOUS at 20:14

## 2021-05-10 RX ADMIN — SODIUM CHLORIDE 20 MG: 9 INJECTION, SOLUTION INTRAVENOUS at 09:33

## 2021-05-10 RX ADMIN — MYCOPHENOLATE MOFETIL 1500 MG: 250 CAPSULE ORAL at 20:14

## 2021-05-10 RX ADMIN — MONTELUKAST 10 MG: 10 TABLET, FILM COATED ORAL at 20:59

## 2021-05-10 RX ADMIN — SENNOSIDES AND DOCUSATE SODIUM 1 TABLET: 8.6; 5 TABLET ORAL at 20:14

## 2021-05-10 RX ADMIN — HYDROMORPHONE HYDROCHLORIDE 0.2 MG: 0.2 INJECTION, SOLUTION INTRAMUSCULAR; INTRAVENOUS; SUBCUTANEOUS at 06:57

## 2021-05-10 RX ADMIN — TERBUTALINE SULFATE 10 MG: 5 TABLET ORAL at 12:08

## 2021-05-10 RX ADMIN — INSULIN ASPART 1 UNITS: 100 INJECTION, SOLUTION INTRAVENOUS; SUBCUTANEOUS at 12:15

## 2021-05-10 RX ADMIN — Medication 25 MG: at 22:21

## 2021-05-10 RX ADMIN — ACETAMINOPHEN 975 MG: 325 TABLET, FILM COATED ORAL at 15:07

## 2021-05-10 RX ADMIN — OXYCODONE HYDROCHLORIDE 5 MG: 5 TABLET ORAL at 08:38

## 2021-05-10 RX ADMIN — INSULIN ASPART 2 UNITS: 100 INJECTION, SOLUTION INTRAVENOUS; SUBCUTANEOUS at 15:06

## 2021-05-10 RX ADMIN — OXYCODONE HYDROCHLORIDE 5 MG: 5 TABLET ORAL at 15:07

## 2021-05-10 RX ADMIN — PREDNISONE 40 MG: 20 TABLET ORAL at 20:15

## 2021-05-10 RX ADMIN — HEPARIN SODIUM 5000 UNITS: 5000 INJECTION, SOLUTION INTRAVENOUS; SUBCUTANEOUS at 03:21

## 2021-05-10 RX ADMIN — ACETAMINOPHEN 975 MG: 325 TABLET, FILM COATED ORAL at 05:06

## 2021-05-10 RX ADMIN — POLYETHYLENE GLYCOL 3350 17 G: 17 POWDER, FOR SOLUTION ORAL at 20:14

## 2021-05-10 RX ADMIN — INSULIN HUMAN 10 UNITS: 100 INJECTION, SUSPENSION SUBCUTANEOUS at 08:02

## 2021-05-10 RX ADMIN — TERBUTALINE SULFATE 10 MG: 5 TABLET ORAL at 03:21

## 2021-05-10 RX ADMIN — GABAPENTIN 300 MG: 300 CAPSULE ORAL at 08:39

## 2021-05-10 RX ADMIN — PREDNISONE 45 MG: 5 TABLET ORAL at 08:38

## 2021-05-10 RX ADMIN — Medication 37.5 MG: at 21:27

## 2021-05-10 RX ADMIN — HEPARIN SODIUM 5000 UNITS: 5000 INJECTION, SOLUTION INTRAVENOUS; SUBCUTANEOUS at 12:08

## 2021-05-10 RX ADMIN — BUPROPION HYDROCHLORIDE 100 MG: 100 TABLET, EXTENDED RELEASE ORAL at 08:38

## 2021-05-10 RX ADMIN — BUPROPION HYDROCHLORIDE 100 MG: 100 TABLET, EXTENDED RELEASE ORAL at 20:14

## 2021-05-10 RX ADMIN — HEPARIN SODIUM 5000 UNITS: 5000 INJECTION, SOLUTION INTRAVENOUS; SUBCUTANEOUS at 20:14

## 2021-05-10 ASSESSMENT — MIFFLIN-ST. JEOR: SCORE: 1842.5

## 2021-05-10 ASSESSMENT — ACTIVITIES OF DAILY LIVING (ADL)
ADLS_ACUITY_SCORE: 14
ADLS_ACUITY_SCORE: 15
ADLS_ACUITY_SCORE: 14
ADLS_ACUITY_SCORE: 15
ADLS_ACUITY_SCORE: 14
ADLS_ACUITY_SCORE: 14

## 2021-05-10 NOTE — PROGRESS NOTES
Pt reported to VAD coordinator that he would like the synthetic rubies from his HM2 pump returned to him. Pt gave verbal permission to share his name, address and phone number (if needed) with [employer] so rubies can be returned directly to him. Pt updated that this process can take several months.

## 2021-05-10 NOTE — PROGRESS NOTES
CVICU PROGRESS NOTE  5/10/2021      Date of Service (when I saw the patient): 5/9/2021    ASSESSMENT: Jim Willingham is a 65 year old male with pmhx of T2DM, CKD, COPD, CEICLIA (cpap), history of NICM s/p Hm2 LVAD, afib who is now s/p heart transplant with Dr. Quigley on 5/6/2021. Intraoperatively, he received 1uPRBC, 2 Plt, 2 fibryga, 2,000 Kcentra. Ischemic time was 157 min. Cross clamp time was 98 min. Coming off bypass, he was shocked > 2x received amio, mag and lidocaine.     CHANGES FOR TODAY:  - transfer to the floor     PLAN:  Neuro:  # Post-operative pain and sedation  - PTA gabapentin 300mg daily, 600mg BID  - Hydromorphone, oxycodone available prn PO  - Lidocaine patches, scheduled acetaminophen    # Depression  - Continue PTA bupropion, amitriptyline     Pulmonary:  # COPD  # CECILIA uses cpap   # Post op ventilatory support, extubated  - BIPAP overnight  - PTA Trelegy ellipta on hold  - Advair HFA, duonebs  - Continue PTA montelukast     Cardiovascular:   # History of NICM s/p HM2 LVAD now s/p heart transplant with Dr. Quigley on 5/6/21   # Cardiogenic shock, resolved  - Inotropy: Epinephrine MAP >65  - Chronotropy: isoproterenol stopped 5/9. Start terbutaline 5/8  - Resume PTA ASA  - Temporary pacemaker, backup rate 100  - Immunosuppression per cardiology: Basiliximab dosed 5/4, methylprednisolone 125 mg q8 hours completed, MMF 1500 mg BID  - Infections ppx: Valganciclovir and Dapsone to begin 5/11, nystatin QID     GI/Nutrition:   # Nutrition  - advance diet as tolerated   - Bowel regimen: scheduled senna, docusate, Miralax     Fluids/Electrolytes/Renal:  # Lactic acidosis, improved  # WALTER on CKD3, baseline Cr 1.5-1.7  - no issue     Endocrine:  # T2DM , stress hyperglycemia   # Hx of gout   - High sliding scale insulin. Add NPH 10 units bid  - Continue PTA allopurinol  - Holding PTA acarbose     ID:  # Perioperative antibiotics  - Complete perioperative antibiotics (vancomycin and cefepime x5 days)    #  Leukocytosis, improved  - WBC normal      Heme:  # Acute blood loss anemia   - Hgb goal >7  - No sign of bleeding     Prophylaxis:    - Mechanical prophylaxis for DVT, subQ heparin   - Protonix daily     Lines/ tubes/ drains:  - PICC today    Disposition:  - transfer to floor    Patient seen, findings and plan discussed with surgical ICU staff, Dr. Linares.    Gina Bullock MD  Anesthesia Resident - [facility]  5/10/2021  11:03 AM  ===================================  INTERVAL HISTORY:   No significant events overnight    OBJECTIVE:   1. VITAL SIGNS:   Temp:  [97.9  F (36.6  C)-98.1  F (36.7  C)] 98  F (36.7  C)  Pulse:  [] 103  Resp:  [14-18] 18  BP: ()/(61-80) 113/80  SpO2:  [94 %-99 %] 98 %    2. INTAKE/ OUTPUT:   I/O last 3 completed shifts:  In: 2069.4 [P.O.:1920; I.V.:149.4]  Out: 2415 [Urine:1895; Chest Tube:520]    3. PHYSICAL EXAMINATION:  General: pleasant, NAD, sitting in chair   HEENT: Mucous membranes moist  Neuro: Follows commands in all extremities  Pulm/Resp: no distress  CV: tachycardic, regular   Abdomen: Soft, non-distended, non-tender  Incisions/Skin: Midline incision covered with wound vac    4. INVESTIGATIONS:   Arterial Blood Gases   Recent Labs   Lab 05/07/21  1900 05/07/21  1700 05/07/21  1420 05/07/21  0340   PH 7.40 7.38 7.39 7.38   PCO2 38 41 40 41   PO2 101 102 130* 153*   HCO3 23 24 24 24     Complete Blood Count   Recent Labs   Lab 05/10/21  0330 05/09/21  1721 05/09/21  0344 05/08/21  2147   WBC 10.9 10.6 10.3 10.4   HGB 8.5* 8.7* 8.8* 6.9*   * 118* 125* 119*     Basic Metabolic Panel  Recent Labs   Lab 05/10/21  0330 05/09/21  1721 05/09/21  0344 05/08/21 2004 05/08/21  0340    134 136  --  137   POTASSIUM 3.8 3.9 3.8 4.0 4.2   CHLORIDE 102 102 103  --  105   CO2 26 24 22  --  21   BUN 90* 96* 93*  --  82*   CR 1.96* 2.14* 2.29*  --  2.53*   * 250* 263*  --  145*     Liver Function Tests  Recent Labs   Lab 05/10/21  0330 05/09/21 0344 05/08/21  0340  05/07/21  2226 05/07/21  0340 05/07/21  0340 05/06/21  0650 05/06/21  0426   AST 34 41 54* 65*   < > 97* Canceled, Test credited  --    ALT 48 49 33 31   < > 31 30  --    ALKPHOS 78 74 48 44   < > 46 60  --    BILITOTAL 0.6 0.7 0.9 0.9   < > 0.6 0.8  --    ALBUMIN 2.8* 2.9* 2.9* 2.8*   < > 3.1* 2.7*  --    INR 1.33*  --   --   --   --  1.38* 1.45* 2.02*    < > = values in this interval not displayed.     Pancreatic Enzymes  Recent Labs   Lab 05/04/21 2313   AMYLASE 93     Coagulation Profile  Recent Labs   Lab 05/10/21  0330 05/07/21  0340 05/06/21  0650 05/06/21  0426 05/04/21  2313 05/04/21  2313   INR 1.33* 1.38* 1.45* 2.02*   < > 2.00*   PTT  --   --  35 26  --  37    < > = values in this interval not displayed.       5. RADIOLOGY:   Recent Results (from the past 24 hour(s))   XR Chest Port 1 View    Narrative    Exam:  Chest X-ray 5/9/2021 11:58 AM    History: picc position    Comparison: X-ray 5/9/2021 and x-ray 5/7/2021    Findings: Frontal radiograph of the chest. Median sternotomy wires,  unchanged from prior. Retained left chest wall ICD lead. Chest tubes  and mediastinal drains are unchanged. New right arm PICC with the tip  projecting over the low SVC. Removal of the Winifred-Aashish catheter with  remaining sheath tip projecting over the right innominate vein.  Persistent perihilar and bibasilar opacities.    No pneumothorax. The upper abdomen is unremarkable. No acute bone  findings.      Impression    Impression:     1. New right arm PICC with  tip projecting over the low SVC. Interval  removal of the Winifred-Aashish catheter.  2. Unchanged perihilar representing atelectasis versus pulmonary edema  or infection.  3. Unchanged left basilar pulmonary opacity representing atelectasis  and/or pleural effusion    I have personally reviewed the examination and initial interpretation  and I agree with the findings.    KENROY CASON MD   XR Chest Port 1 View    Narrative    XR CHEST PORT 1 VIEW  5/10/2021 2:17 AM       HISTORY: s/p heart txp    COMPARISON: Chest radiograph 5/9/2021, 5/8/2021.    FINDINGS:   Portable AP 30 degree radiograph of the chest. Postsurgical heart  transplant changes. Median sternotomy wires are intact. Bilateral  chest tubes and mediastinal/pericardial drains in stable position.  Right upper extremity PICC tip terminates in the mid SVC. Retained  left chest wall ICD lead. Interval removal of right internal jugular  central catheter.    Trachea is midline. Cardiac silhouette is enlarged. Pulmonary  vasculature is indistinct. Retrocardiac and bilateral mixed  interstitial and airspace opacities are not significant changed. No  pneumothorax. Small left pleural effusion.    Visualized upper abdomen is unremarkable. Visualized soft tissues are  unremarkable. No acute osseous abnormality.      Impression    IMPRESSION:     1. Postsurgical heart transplant changes with slightly increased  perihilar interstitial opacities which may represent atelectasis  versus pulmonary edema.  2. Small left pleural effusion.  3. No pneumothorax.    I have personally reviewed the examination and initial interpretation  and I agree with the findings.    JEROME CUMMINGS MD       =========================================

## 2021-05-10 NOTE — PROGRESS NOTES
Transferred to: Unit 6C at (1720)  Belongings: sent up on cart, cell phone, cpap and clothes  Russell removed? Yes  Central line removed? No, will need PICC  Chart and medications sent with patient Yes  Family notified: Yes    Pt sent in bed with RN on monitor to 6C. Report give to NATALI Luque RN

## 2021-05-10 NOTE — PROGRESS NOTES
Transplant Social Work Services Progress Note      Date of Initial Social Work Evaluation: 2/10/2021  Collaborated with: Pt     Data: Pt is s/p LVAD explant and heart transplant on 5/6. Pt extubated 5/7. Pt has floor orders. Pt doing well with therapies; just had finished a walk.     Intervention: Supportive Visit   Assessment: Pt is alert and oriented x4 and engaged with writer. Pt coping well post-transplant.   Education provided by SW: Ongoing Social Work support   Plan:    Discharge Plans in Progress: Continuing to assess discharge needs     Barriers to d/c plan: Medical     Follow up Plan: SW to continue to follow.

## 2021-05-10 NOTE — PROGRESS NOTES
Notification e-mail/call of patient's transplant went out to HIM, LVAD Coordinator, Maya Polanco/Nba and Research Coordinators.    Notification e-mail of patient's transplant went out to cardiologist Dr. Montgomery along with information of Patient's referring cardiologist.    Date of transplant 5/6/2021    E-mail was sent out on 5/10/2021

## 2021-05-10 NOTE — PROGRESS NOTES
Stopped by pt room for routine VAD coordinator rounding. Pt was with another provider. Will stop back at a later date.

## 2021-05-10 NOTE — PROGRESS NOTES
Transfer        4:34 PM  ---------------------------------------------------------------------  Transferred to/from: 4e  Via: Bed  Reason for transfer: Transfer to floor  Family: Aware of transfer  Belongings: Sent with pt  Chart: Sent with pt  Medications: Meds from bin sent with pt  Report received from: Maryellen Swanp:  [97.6  F (36.4  C)-98  F (36.7  C)] 97.6  F (36.4  C)  Pulse:  [100-104] 103  Resp:  [14-18] 18  BP: (110-116)/(66-87) 116/66  SpO2:  [96 %-99 %] 98 %

## 2021-05-10 NOTE — PLAN OF CARE
Major Shift Events: No acute events overnight. Neuro intact. Minimal pain overnight treated with Tylenol and PRN Oxy. Sinus tach all shift with stable BP. Room air all shift. Minimal chest tube output. BG in 200's for most of shift. No BM. Adequate urine output. Central line internal jugular removed.  Plan: Transfer to   For vital signs and complete assessments, please see documentation flowsheets.

## 2021-05-11 ENCOUNTER — APPOINTMENT (OUTPATIENT)
Dept: PHYSICAL THERAPY | Facility: CLINIC | Age: 66
DRG: 001 | End: 2021-05-11
Attending: STUDENT IN AN ORGANIZED HEALTH CARE EDUCATION/TRAINING PROGRAM
Payer: COMMERCIAL

## 2021-05-11 LAB
ALBUMIN SERPL-MCNC: 2.8 G/DL (ref 3.4–5)
ALP SERPL-CCNC: 73 U/L (ref 40–150)
ALT SERPL W P-5'-P-CCNC: 49 U/L (ref 0–70)
ANION GAP SERPL CALCULATED.3IONS-SCNC: 6 MMOL/L (ref 3–14)
ANION GAP SERPL CALCULATED.3IONS-SCNC: 6 MMOL/L (ref 3–14)
AST SERPL W P-5'-P-CCNC: 28 U/L (ref 0–45)
BASOPHILS # BLD AUTO: 0 10E9/L (ref 0–0.2)
BASOPHILS NFR BLD AUTO: 0 %
BILIRUB SERPL-MCNC: 0.6 MG/DL (ref 0.2–1.3)
BUN SERPL-MCNC: 66 MG/DL (ref 7–30)
BUN SERPL-MCNC: 72 MG/DL (ref 7–30)
CALCIUM SERPL-MCNC: 7.6 MG/DL (ref 8.5–10.1)
CALCIUM SERPL-MCNC: 8.3 MG/DL (ref 8.5–10.1)
CHLORIDE SERPL-SCNC: 101 MMOL/L (ref 94–109)
CHLORIDE SERPL-SCNC: 97 MMOL/L (ref 94–109)
CO2 SERPL-SCNC: 27 MMOL/L (ref 20–32)
CO2 SERPL-SCNC: 28 MMOL/L (ref 20–32)
CREAT SERPL-MCNC: 1.53 MG/DL (ref 0.66–1.25)
CREAT SERPL-MCNC: 1.65 MG/DL (ref 0.66–1.25)
DIFFERENTIAL METHOD BLD: ABNORMAL
EOSINOPHIL # BLD AUTO: 0 10E9/L (ref 0–0.7)
EOSINOPHIL NFR BLD AUTO: 0 %
ERYTHROCYTE [DISTWIDTH] IN BLOOD BY AUTOMATED COUNT: 15.5 % (ref 10–15)
GFR SERPL CREATININE-BSD FRML MDRD: 43 ML/MIN/{1.73_M2}
GFR SERPL CREATININE-BSD FRML MDRD: 47 ML/MIN/{1.73_M2}
GLUCOSE BLDC GLUCOMTR-MCNC: 150 MG/DL (ref 70–99)
GLUCOSE BLDC GLUCOMTR-MCNC: 159 MG/DL (ref 70–99)
GLUCOSE BLDC GLUCOMTR-MCNC: 163 MG/DL (ref 70–99)
GLUCOSE BLDC GLUCOMTR-MCNC: 228 MG/DL (ref 70–99)
GLUCOSE BLDC GLUCOMTR-MCNC: 232 MG/DL (ref 70–99)
GLUCOSE SERPL-MCNC: 154 MG/DL (ref 70–99)
GLUCOSE SERPL-MCNC: 158 MG/DL (ref 70–99)
HCT VFR BLD AUTO: 24.7 % (ref 40–53)
HGB BLD-MCNC: 8.1 G/DL (ref 13.3–17.7)
IMM GRANULOCYTES # BLD: 0.1 10E9/L (ref 0–0.4)
IMM GRANULOCYTES NFR BLD: 0.9 %
LYMPHOCYTES # BLD AUTO: 0.4 10E9/L (ref 0.8–5.3)
LYMPHOCYTES NFR BLD AUTO: 3.3 %
MCH RBC QN AUTO: 31.3 PG (ref 26.5–33)
MCHC RBC AUTO-ENTMCNC: 32.8 G/DL (ref 31.5–36.5)
MCV RBC AUTO: 95 FL (ref 78–100)
MONOCYTES # BLD AUTO: 0.9 10E9/L (ref 0–1.3)
MONOCYTES NFR BLD AUTO: 7.4 %
NEUTROPHILS # BLD AUTO: 10.3 10E9/L (ref 1.6–8.3)
NEUTROPHILS NFR BLD AUTO: 88.4 %
PLATELET # BLD AUTO: 130 10E9/L (ref 150–450)
POTASSIUM SERPL-SCNC: 4.6 MMOL/L (ref 3.4–5.3)
POTASSIUM SERPL-SCNC: 4.8 MMOL/L (ref 3.4–5.3)
PROT SERPL-MCNC: 5 G/DL (ref 6.8–8.8)
RBC # BLD AUTO: 2.59 10E12/L (ref 4.4–5.9)
SODIUM SERPL-SCNC: 131 MMOL/L (ref 133–144)
SODIUM SERPL-SCNC: 134 MMOL/L (ref 133–144)
UFH PPP CHRO-ACNC: <0.1 IU/ML
WBC # BLD AUTO: 11.6 10E9/L (ref 4–11)

## 2021-05-11 PROCEDURE — 97161 PT EVAL LOW COMPLEX 20 MIN: CPT | Mod: GP | Performed by: PHYSICAL THERAPIST

## 2021-05-11 PROCEDURE — 97530 THERAPEUTIC ACTIVITIES: CPT | Mod: GP | Performed by: PHYSICAL THERAPIST

## 2021-05-11 PROCEDURE — 999N001017 HC STATISTIC GLUCOSE BY METER IP

## 2021-05-11 PROCEDURE — 250N000011 HC RX IP 250 OP 636: Performed by: NURSE PRACTITIONER

## 2021-05-11 PROCEDURE — 80053 COMPREHEN METABOLIC PANEL: CPT | Performed by: STUDENT IN AN ORGANIZED HEALTH CARE EDUCATION/TRAINING PROGRAM

## 2021-05-11 PROCEDURE — 250N000009 HC RX 250: Performed by: STUDENT IN AN ORGANIZED HEALTH CARE EDUCATION/TRAINING PROGRAM

## 2021-05-11 PROCEDURE — 250N000013 HC RX MED GY IP 250 OP 250 PS 637: Performed by: STUDENT IN AN ORGANIZED HEALTH CARE EDUCATION/TRAINING PROGRAM

## 2021-05-11 PROCEDURE — 250N000013 HC RX MED GY IP 250 OP 250 PS 637: Performed by: SURGERY

## 2021-05-11 PROCEDURE — 85520 HEPARIN ASSAY: CPT | Performed by: PHYSICIAN ASSISTANT

## 2021-05-11 PROCEDURE — 250N000013 HC RX MED GY IP 250 OP 250 PS 637: Performed by: NURSE PRACTITIONER

## 2021-05-11 PROCEDURE — 250N000009 HC RX 250: Performed by: SURGERY

## 2021-05-11 PROCEDURE — 250N000012 HC RX MED GY IP 250 OP 636 PS 637: Performed by: NURSE PRACTITIONER

## 2021-05-11 PROCEDURE — 85004 AUTOMATED DIFF WBC COUNT: CPT | Performed by: PHYSICIAN ASSISTANT

## 2021-05-11 PROCEDURE — 250N000011 HC RX IP 250 OP 636: Performed by: STUDENT IN AN ORGANIZED HEALTH CARE EDUCATION/TRAINING PROGRAM

## 2021-05-11 PROCEDURE — 36415 COLL VENOUS BLD VENIPUNCTURE: CPT | Performed by: PHYSICIAN ASSISTANT

## 2021-05-11 PROCEDURE — 250N000012 HC RX MED GY IP 250 OP 636 PS 637: Performed by: STUDENT IN AN ORGANIZED HEALTH CARE EDUCATION/TRAINING PROGRAM

## 2021-05-11 PROCEDURE — 36592 COLLECT BLOOD FROM PICC: CPT | Performed by: NURSE PRACTITIONER

## 2021-05-11 PROCEDURE — 250N000011 HC RX IP 250 OP 636: Performed by: PHYSICIAN ASSISTANT

## 2021-05-11 PROCEDURE — 99233 SBSQ HOSP IP/OBS HIGH 50: CPT | Performed by: NURSE PRACTITIONER

## 2021-05-11 PROCEDURE — 80048 BASIC METABOLIC PNL TOTAL CA: CPT | Performed by: NURSE PRACTITIONER

## 2021-05-11 PROCEDURE — 250N000012 HC RX MED GY IP 250 OP 636 PS 637: Performed by: SURGERY

## 2021-05-11 PROCEDURE — 97116 GAIT TRAINING THERAPY: CPT | Mod: GP | Performed by: PHYSICAL THERAPIST

## 2021-05-11 PROCEDURE — 85027 COMPLETE CBC AUTOMATED: CPT | Performed by: PHYSICIAN ASSISTANT

## 2021-05-11 PROCEDURE — 250N000013 HC RX MED GY IP 250 OP 250 PS 637: Performed by: PHYSICIAN ASSISTANT

## 2021-05-11 PROCEDURE — 214N000001 HC R&B CCU UMMC

## 2021-05-11 RX ORDER — ASPIRIN 81 MG/1
81 TABLET, CHEWABLE ORAL DAILY
Status: DISCONTINUED | OUTPATIENT
Start: 2021-05-11 | End: 2021-05-31 | Stop reason: HOSPADM

## 2021-05-11 RX ORDER — BUMETANIDE 0.25 MG/ML
2 INJECTION INTRAMUSCULAR; INTRAVENOUS ONCE
Status: COMPLETED | OUTPATIENT
Start: 2021-05-11 | End: 2021-05-11

## 2021-05-11 RX ORDER — HEPARIN SODIUM,PORCINE 10 UNIT/ML
5-10 VIAL (ML) INTRAVENOUS
Status: DISCONTINUED | OUTPATIENT
Start: 2021-05-11 | End: 2021-05-31 | Stop reason: HOSPADM

## 2021-05-11 RX ORDER — HEPARIN SODIUM,PORCINE 10 UNIT/ML
5-10 VIAL (ML) INTRAVENOUS EVERY 24 HOURS
Status: DISCONTINUED | OUTPATIENT
Start: 2021-05-11 | End: 2021-05-31 | Stop reason: HOSPADM

## 2021-05-11 RX ORDER — ROSUVASTATIN CALCIUM 10 MG/1
10 TABLET, COATED ORAL DAILY
Status: DISCONTINUED | OUTPATIENT
Start: 2021-05-12 | End: 2021-05-31 | Stop reason: HOSPADM

## 2021-05-11 RX ORDER — TACROLIMUS 1 MG/1
1 CAPSULE ORAL
Status: DISCONTINUED | OUTPATIENT
Start: 2021-05-11 | End: 2021-05-14

## 2021-05-11 RX ADMIN — DAPSONE 50 MG: 25 TABLET ORAL at 08:18

## 2021-05-11 RX ADMIN — TOPICAL ANESTHETIC 0.5 ML: 200 SPRAY DENTAL; PERIODONTAL at 12:48

## 2021-05-11 RX ADMIN — OXYCODONE HYDROCHLORIDE 5 MG: 5 TABLET ORAL at 14:27

## 2021-05-11 RX ADMIN — NYSTATIN 1000000 UNITS: 500000 SUSPENSION ORAL at 12:45

## 2021-05-11 RX ADMIN — FLUTICASONE FUROATE AND VILANTEROL TRIFENATATE 1 PUFF: 100; 25 POWDER RESPIRATORY (INHALATION) at 08:21

## 2021-05-11 RX ADMIN — SODIUM CHLORIDE, PRESERVATIVE FREE 5 ML: 5 INJECTION INTRAVENOUS at 13:08

## 2021-05-11 RX ADMIN — ALLOPURINOL 300 MG: 300 TABLET ORAL at 08:19

## 2021-05-11 RX ADMIN — PREDNISONE 40 MG: 20 TABLET ORAL at 08:18

## 2021-05-11 RX ADMIN — PREDNISONE 40 MG: 20 TABLET ORAL at 19:37

## 2021-05-11 RX ADMIN — TERBUTALINE SULFATE 10 MG: 5 TABLET ORAL at 03:41

## 2021-05-11 RX ADMIN — NYSTATIN 1000000 UNITS: 500000 SUSPENSION ORAL at 19:37

## 2021-05-11 RX ADMIN — HEPARIN SODIUM 5000 UNITS: 5000 INJECTION, SOLUTION INTRAVENOUS; SUBCUTANEOUS at 12:44

## 2021-05-11 RX ADMIN — MYCOPHENOLATE MOFETIL 1500 MG: 250 CAPSULE ORAL at 19:37

## 2021-05-11 RX ADMIN — IPRATROPIUM BROMIDE AND ALBUTEROL SULFATE 3 ML: .5; 3 SOLUTION RESPIRATORY (INHALATION) at 11:02

## 2021-05-11 RX ADMIN — ALBUTEROL SULFATE 2 PUFF: 90 AEROSOL, METERED RESPIRATORY (INHALATION) at 08:11

## 2021-05-11 RX ADMIN — UMECLIDINIUM 1 PUFF: 62.5 AEROSOL, POWDER ORAL at 08:22

## 2021-05-11 RX ADMIN — BUMETANIDE 2 MG: 0.25 INJECTION INTRAMUSCULAR; INTRAVENOUS at 13:03

## 2021-05-11 RX ADMIN — GABAPENTIN 600 MG: 300 CAPSULE ORAL at 13:03

## 2021-05-11 RX ADMIN — TERBUTALINE SULFATE 10 MG: 5 TABLET ORAL at 12:44

## 2021-05-11 RX ADMIN — HEPARIN SODIUM 5000 UNITS: 5000 INJECTION, SOLUTION INTRAVENOUS; SUBCUTANEOUS at 03:41

## 2021-05-11 RX ADMIN — ACETAMINOPHEN 975 MG: 325 TABLET, FILM COATED ORAL at 05:35

## 2021-05-11 RX ADMIN — TACROLIMUS 1 MG: 1 CAPSULE ORAL at 18:59

## 2021-05-11 RX ADMIN — HYDROMORPHONE HYDROCHLORIDE 0.4 MG: 1 INJECTION, SOLUTION INTRAMUSCULAR; INTRAVENOUS; SUBCUTANEOUS at 08:11

## 2021-05-11 RX ADMIN — SODIUM CHLORIDE, PRESERVATIVE FREE 5 ML: 5 INJECTION INTRAVENOUS at 05:36

## 2021-05-11 RX ADMIN — POLYETHYLENE GLYCOL 3350 17 G: 17 POWDER, FOR SOLUTION ORAL at 19:36

## 2021-05-11 RX ADMIN — HYDROMORPHONE HYDROCHLORIDE 0.4 MG: 1 INJECTION, SOLUTION INTRAMUSCULAR; INTRAVENOUS; SUBCUTANEOUS at 00:33

## 2021-05-11 RX ADMIN — DOCUSATE SODIUM 100 MG: 100 CAPSULE, LIQUID FILLED ORAL at 19:37

## 2021-05-11 RX ADMIN — Medication 10 MG: at 21:38

## 2021-05-11 RX ADMIN — Medication 37.5 MG: at 21:38

## 2021-05-11 RX ADMIN — SENNOSIDES AND DOCUSATE SODIUM 1 TABLET: 8.6; 5 TABLET ORAL at 19:37

## 2021-05-11 RX ADMIN — BUMETANIDE 2 MG: 0.25 INJECTION INTRAMUSCULAR; INTRAVENOUS at 08:46

## 2021-05-11 RX ADMIN — GABAPENTIN 300 MG: 300 CAPSULE ORAL at 08:18

## 2021-05-11 RX ADMIN — SODIUM CHLORIDE, PRESERVATIVE FREE 5 ML: 5 INJECTION INTRAVENOUS at 10:38

## 2021-05-11 RX ADMIN — Medication 50 MG: at 16:59

## 2021-05-11 RX ADMIN — POLYETHYLENE GLYCOL 3350 17 G: 17 POWDER, FOR SOLUTION ORAL at 08:19

## 2021-05-11 RX ADMIN — BUPROPION HYDROCHLORIDE 100 MG: 100 TABLET, EXTENDED RELEASE ORAL at 08:19

## 2021-05-11 RX ADMIN — SENNOSIDES AND DOCUSATE SODIUM 1 TABLET: 8.6; 5 TABLET ORAL at 08:46

## 2021-05-11 RX ADMIN — TERBUTALINE SULFATE 7.5 MG: 2.5 TABLET ORAL at 19:37

## 2021-05-11 RX ADMIN — NYSTATIN 1000000 UNITS: 500000 SUSPENSION ORAL at 16:43

## 2021-05-11 RX ADMIN — OXYCODONE HYDROCHLORIDE 5 MG: 5 TABLET ORAL at 03:41

## 2021-05-11 RX ADMIN — NYSTATIN 1000000 UNITS: 500000 SUSPENSION ORAL at 08:18

## 2021-05-11 RX ADMIN — MYCOPHENOLATE MOFETIL 1500 MG: 250 CAPSULE ORAL at 08:17

## 2021-05-11 RX ADMIN — ONDANSETRON 4 MG: 2 INJECTION INTRAMUSCULAR; INTRAVENOUS at 10:24

## 2021-05-11 RX ADMIN — OXYCODONE HYDROCHLORIDE 5 MG: 5 TABLET ORAL at 21:37

## 2021-05-11 RX ADMIN — PANTOPRAZOLE SODIUM 40 MG: 40 TABLET, DELAYED RELEASE ORAL at 08:19

## 2021-05-11 RX ADMIN — HEPARIN SODIUM 5000 UNITS: 5000 INJECTION, SOLUTION INTRAVENOUS; SUBCUTANEOUS at 21:37

## 2021-05-11 RX ADMIN — ACETAMINOPHEN 975 MG: 325 TABLET, FILM COATED ORAL at 13:04

## 2021-05-11 RX ADMIN — GABAPENTIN 600 MG: 300 CAPSULE ORAL at 21:37

## 2021-05-11 RX ADMIN — VALGANCICLOVIR 450 MG: 450 TABLET, FILM COATED ORAL at 08:18

## 2021-05-11 RX ADMIN — ASPIRIN 81 MG CHEWABLE TABLET 81 MG: 81 TABLET CHEWABLE at 08:24

## 2021-05-11 RX ADMIN — HYDROMORPHONE HYDROCHLORIDE 0.4 MG: 1 INJECTION, SOLUTION INTRAMUSCULAR; INTRAVENOUS; SUBCUTANEOUS at 16:59

## 2021-05-11 RX ADMIN — SODIUM CHLORIDE, PRESERVATIVE FREE 5 ML: 5 INJECTION INTRAVENOUS at 08:27

## 2021-05-11 RX ADMIN — MONTELUKAST 10 MG: 10 TABLET, FILM COATED ORAL at 21:37

## 2021-05-11 RX ADMIN — OXYCODONE HYDROCHLORIDE 5 MG: 5 TABLET ORAL at 10:24

## 2021-05-11 RX ADMIN — HYDROMORPHONE HYDROCHLORIDE 0.2 MG: 0.2 INJECTION, SOLUTION INTRAMUSCULAR; INTRAVENOUS; SUBCUTANEOUS at 13:08

## 2021-05-11 RX ADMIN — ACETAMINOPHEN 975 MG: 325 TABLET, FILM COATED ORAL at 21:37

## 2021-05-11 RX ADMIN — BUPROPION HYDROCHLORIDE 100 MG: 100 TABLET, EXTENDED RELEASE ORAL at 19:37

## 2021-05-11 RX ADMIN — DOCUSATE SODIUM 100 MG: 100 CAPSULE, LIQUID FILLED ORAL at 08:27

## 2021-05-11 ASSESSMENT — ACTIVITIES OF DAILY LIVING (ADL)
ADLS_ACUITY_SCORE: 14

## 2021-05-11 ASSESSMENT — MIFFLIN-ST. JEOR: SCORE: 1865.39

## 2021-05-11 NOTE — PROGRESS NOTES
05/11/21 1320   Quick Adds   Type of Visit Initial PT Evaluation   Living Environment   People in home grandchild(lidia);spouse   Current Living Arrangements house   Home Accessibility stairs to enter home;stairs within home   Number of Stairs, Main Entrance 1   Stair Railings, Main Entrance none   Number of Stairs, Within Home, Primary 8   Stair Railings, Within Home, Primary railing on right side (ascending)   Transportation Anticipated car, drives self;family or friend will provide   Living Environment Comments pt has been hospitalized since 2/8/21   Self-Care   Usual Activity Tolerance moderate   Current Activity Tolerance fair   Activity/Exercise/Self-Care Comment Prior to hospitalization reported for gait he used a quad cane intermittently. Per prior eval notes in Feb 2021 pt reported a 6 wk decline in activity tolerance prior to his admission.    Disability/Function   Hearing Difficulty or Deaf no   Wear Glasses or Blind no   Walking or Climbing Stairs Difficulty other (see comments)  (intermittent quad cane use)   Dressing/Bathing Difficulty no   Toileting issues no   Doing Errands Independently Difficulty (such as shopping) other (see comments)   Errands Management   (per RN notes prior to admission had groceries delivered )   Fall history within last six months yes   Number of times patient has fallen within last six months 1   Change in Functional Status Since Onset of Current Illness/Injury yes   General Information   Onset of Illness/Injury or Date of Surgery 05/08/21   Referring Physician Ronit Rogers MD   Patient/Family Therapy Goals Statement (PT) To go home.    Pertinent History of Current Problem (include personal factors and/or comorbidities that impact the POC) Jim Willingham is a 65 year old male with history of NICM EF 20% c/b VT s/p CRT-D s/p HMII LVAD 6/19/2017 originally intended as destination therapy 2/2 obesity however with recent weight loss now candidate for transplantation. He is  admitted for worsening functional status and renal function concerning for worsening heart failure s/p OHT 5/6/21.   Existing Precautions/Restrictions fall;sternal   General Observations activity: as tolerated   Cognition   Follows Commands (Cognition) WFL   Pain Assessment   Patient Currently in Pain No   Bed Mobility   Comment (Bed Mobility) NT   Transfers   Transfer Safety Comments YADY of 2 to MODA of 1 sit>stand with FWW, reduced eccentric control noted stand>sit   Gait/Stairs (Locomotion)   Comment (Gait/Stairs) Ambulates with slowed gait, reduced foot clearance, upright posture with CGA-YADY of 1, FWW and close WC follow.    Balance   Balance Comments impaired with transfers and gait, requires assist and walker   Sensory Examination   Sensory Perception Comments denies concerns, NT due to time limits   Clinical Impression   Criteria for Skilled Therapeutic Intervention yes, treatment indicated   PT Diagnosis (PT) impaired mobility   Influenced by the following impairments reduced LE strength, activity tolerance, balance   Functional limitations due to impairments below baseline transfers, gait; anticipate below baseline bed mobility   Clinical Presentation Stable/Uncomplicated   Clinical Presentation Rationale clinical judgement, [facility]   Clinical Decision Making (Complexity) moderate complexity   Therapy Frequency (PT) Daily   Predicted Duration of Therapy Intervention (days/wks)  1 week   Planned Therapy Interventions (PT) balance training;bed mobility training;gait training;home exercise program;neuromuscular re-education;ROM (range of motion);postural re-education;stair training;strengthening;stretching;transfer training;patient/family education   Risk & Benefits of therapy have been explained evaluation/treatment results reviewed;care plan/treatment goals reviewed;risks/benefits reviewed;current/potential barriers reviewed;participants voiced agreement with care plan;participants included;patient   PT  Discharge Planning    PT Discharge Recommendation (DC Rec) [facility]-Mot[facility] patient will benefit from intensive, interdisciplinary therapy.  Anticipate will be able to tolerate 3 hours of therapy per day   PT Rationale for DC Rec Advise ARU as pt highly motivated to work hard with therapies (has multi-disciplinary needs) to ensure discharge to home asap once safe. At present pt is below baseline functional level, unsafe for home discharge.    PT Brief overview of current status  Ax2 and FWW for transfers, WC follow   Total Evaluation Time   Total Evaluation Time (Minutes) 6

## 2021-05-11 NOTE — OP NOTE
Procedure Date: 05/06/2021    PREOPERATIVE DIAGNOSES:    1.  Dilated cardiomyopathy, status post HeartMate II left ventricular assist device.  2.  Right ventricular failure.    POSTOPERATIVE DIAGNOSES:  1.  Dilated cardiomyopathy, status post HeartMate II left ventricular assist device.  2.  Right ventricular failure.    PROCEDURE PERFORMED:  1.  Redo median sternotomy.  2.  Orthotopic heart transplantation.  3.  Explant of HeartMate II left ventricular assist device (intracorporeal left ventricular assist device).  4.  Removal of AICD and defibrillator.    SURGEON:  Ronnie Quigley MD.    CO-SURGEON:  Chase Albright MD.  Note, a secondary additional surgeon was required for this operation because of complexity of operation as the outflow graft was adjacent to the posterior wall of the sternum.     ASSISTANT:  Otilio Potter MD, Ronit Rogers MD, Alexis Odom MD    OPERATIVE INDICATIONS:  The patient is a 65-year-old gentleman on the heart transplant waiting list, who underwent placement of a HeartMate II left ventricular assist device several years ago.  He was recently admitted with congestive heart failure from right heart failure.  Decision was made to proceed with orthotopic heart transplantation.  Appropriate donor was identified for the patient.  I discussed risks and benefits of surgery with the patient and family including risk of death, stroke, bleeding, wound failure, renal failure, heart failure, arrhythmia.  He understood and willing to proceed with surgery.  Discussed the plan with the cardiology team who has agreed with us to proceed.    OPERATIVE FINDINGS:  The patient started with adequate quality pericardial spacer.  Had moderate adhesions.  The outflow graft was attached adjacent to the posterior wall of the sternum.  The donor heart appeared grossly normal except for a small patent foramen ovale.    DESCRIPTION OF PROCEDURE:  The patient was brought to the operating room in stable condition  for the administration of general anesthesia.  The patient's chest, abdomen, lower extremities were prepped and draped in the usual sterile manner.  Redo median sternotomy was carefully performed with the oscillating saw.  The adhesions were carefully dissected to expose the aorta, superior and inferior vena cava.  While freeing the lung adhesions of the heart, we inadvertently made a small injury on the surface of the lung that was oversewn with pledgetted suture. Preparation for cardiopulmonary bypass included ACT guided heparinization and administration of Amicar.  Aorta cannulated with a 20-Monegasque arterial cannula via a 3 0 tevdek purse string pledgetted sutture  x 2.  Venous cannula was inserted into both superior and inferior vena cava.  Full cardiopulmonary bypass was initiated.  Arrest of the heart was carefully dissected and the pump was also carefully dissected.  Caval tapes were applied.  Aortic pressure was temporarily reduced and the aorta was cross clamped.  The heart was explanted leaving bank cuffs of left atrium, superior vena cava, inferior vena cava, aorta and pulmonary artery.  The LVAD was completely excised and removed in its entirety.  There were small rents in the peritoneal cavity that were closed with interrupted 2-0 tevdek  suture.  The donor heart was identified.  The left atrial and the IVC anastomosis was performed with continuous 3-0 Prolene.  Next, the pulmonary artery and the aortic anastomosis was performed with continuous 4-0 Prolene suture.  De-airing maneuvers were performed in the usual sterile fashion and with high suction on both the aortic root vent and the left ventricle.  The cross clamp was released.  Organized rhythm resumed after the need for multiple defibrillations.  Superior vena caval anastomosis was performed with continuous 4-0 Prolene suture.  A long period of reperfusion and rewarming was allowed.  De-airing was confirmed by echocardiography.  The patient was  gradually weaned off cardiopulmonary bypass with low dose epinephrine, Levophed, isuprel and nitric oxide.  Following termination of cardiopulmonary bypass biventricular function within normal limits.  Protamine was given.  All catheters removed.  The right ventricular to right atrial pacing wires were placed.  Careful hemostasis was obtained.  Two anterior mediastinal chest tubes, bilateral pleural chest tubes, postop Todd drains were placed.  Sternum was closed with surgical steel wires. Usual chest tubes werre was placed.  Chest closed in layers.  Left infraclavicular incision was made.  AICD defibrillator was removed, the wound closed in layers.  The patient was transferred to ICU in stable critical condition.      Ronnie Quigley MD        D: 05/10/2021   T: 05/10/2021   MT: [facility]    Name:     LOIS FULLER  MRN:      [id number]        Account:        [id number]   :      1955           Procedure Date: 2021     Document: [id number]    cc:  Delisa Montgomery MD   [facility] Surgical Billing

## 2021-05-11 NOTE — PROGRESS NOTES
Cardiovascular Surgery Progress Note  05/11/2021         Assessment and Plan:     Mr. Jim Willingham is a 65 year old male with history of NICM EF 20% c/b VT s/p CRT-D s/p HMII LVAD 6/19/2017 originally intended as destination therapy 2/2 obesity however with recent weight loss now candidate for transplantation. He was admitted 2/8/21 for worsening functional status and renal function concerning for worsening heart failure. He was eventually listed status 2E for transplant and received a donor offer.  Jim is now s/p orthotopic heart transplant on 5/6/21 with Dr. Ronnie Quigley. He was shocked > 2x received amio, mag and lidocaine while coming off bypass.     Cardiovascular:   Hx of NICM EF 20%, VT (ICD)  Hx HeartMate II LVAD 6/19/2017  Cardiorenal syndrome   S/P orthotopic heart transplant   No arrhythmia, HD stable.  Most recent echo showed LV EF 65-70% on 5/9/21.  Basiliximab 5/10.  Terbutaline 10 mg TID.    ASA 81 mg, Crestor 10 mg daily.   Diuresis with Bumex, Cards II following.   Chest tubes: mediastinal tubes with slow intermittent air leak, pleurals remain. Keep to suction while in room, can walk off suction.   TPW: capped     Pulmonary:  Hx COPD and CECILIA on CPAP  - PTA Breo ellipta, Incruse ellipta, and Singulair   - Extubated POD 1 to 4 lpm via NC. Now saturating well on RA.   - Supplemental O2 PRN to keep sats > 92%. Wean off as tolerated.  - Pulm toilet, IS, activity and deep breathing    Neurology / MSK:  Hx Depression  - PTA bupropion and amitriptyline  Post-op Status  - Neuro intact  - Acute post-operative pain well controlled with acetaminophen, PO oxycodone PRN, IV dilaudid PRN     / Renal:  WALTER on CKD stage III   - Cardiorenal syndrome. Most recent creatinine 1.65, adequate UOP.   - Day before surgery weight 102.3 kg.     GI / FEN:   - Regular diet with supplements, continue bowel regimen  - Replace electrolytes as needed, hepatic enzymes WNL.    Endocrine:  DMT2  Stress induced hyperglycemia  "initially managed on insulin drip postop, transitioned to NPH BID and sliding scale.     Infectious Disease:  Stress induced leukocytosis  - WBC 11.6, remains afebrile, no signs or symptoms of infection  - Completed perioperative antibiotics    Hematology:   Acute blood loss anemia and thrombocytopenia  Hgb 8.1; Plt 130, no signs or symptoms of active bleeding    Anticoagulation:   - ASA only    Prophylaxis:   - Stress ulcer prophylaxis: Pantoprazole 40 mg daily for 30 days  - DVT prophylaxis: Subcutaneous heparin, SCD    Disposition:   - Transferred to  on 5/10   - Therapies recommending discharge to Home vs ARU    Discussed with CVTS Fellow as needed.  Staff surgeons have been informed of changes through both verbal and written communication.      Nash Amado PA-C  Cardiothoracic Surgery  Pager [phone]    7:53 AM   May 11, 2021        Interval History:     No overnight events. Up from ICU last night   States pain is well managed on current regimen. Slept well overnight.  Tolerating diet, is passing flatus, + BM. No nausea or vomiting.  Breathing well without complaints.   Working with therapies and ambulating in halls with assistance.   Denies chest pain, palpitations, dizziness, syncopal symptoms, fevers, chills, myalgias, or sternal popping/clicking.         Physical Exam:   Blood pressure 116/69, pulse 102, temperature 98.4  F (36.9  C), temperature source Oral, resp. rate 16, height 1.727 m (5' 8\"), weight 109.6 kg (241 lb 9.6 oz), SpO2 98 %.  Vitals:    05/09/21 0400 05/10/21 0000 05/11/21 0345   Weight: 109.6 kg (241 lb 10 oz) 107.3 kg (236 lb 8.9 oz) 109.6 kg (241 lb 9.6 oz)      Weight; + ~7 kg since admit and trending up.   24 hr Fluid status; net loss 850 mL.   MAPs: 78 - 84     Gen: A&Ox4, NAD  Neuro: no focal deficits   CV: sinus tachy, normal S1 S2, no murmurs, rubs or gallops.   Pulm: CTA, no wheezing or rhonchi, normal breathing on RA  Abd: nondistended, normal BS, soft, nontender  Ext: + " "peripheral edema  Incision: clean, dry, intact, no erythema, sternum stable. Old drivelline site clean and dry, repacked with 1/4\" saline moistened nugauze strip.   Tubes/drain sites: dressing clean and dry, serosanguinous output, + air leak from mediastinal tubes. 24 hr output 500 mL.          Data:    Imaging:  reviewed recent imaging, no acute concerns    Labs:  BMP  Recent Labs   Lab 05/11/21  0542 05/10/21  0330 05/09/21  1721 05/09/21  0344    134 134 136   POTASSIUM 4.6 3.8 3.9 3.8   CHLORIDE 101 102 102 103   QAMAR 7.6* 8.0* 7.9* 7.7*   CO2 27 26 24 22   BUN 72* 90* 96* 93*   CR 1.65* 1.96* 2.14* 2.29*   * 188* 250* 263*     CBC  Recent Labs   Lab 05/11/21  0542 05/10/21  0330 05/09/21  1721 05/09/21  0344   WBC 11.6* 10.9 10.6 10.3   RBC 2.59* 2.71* 2.89* 2.97*   HGB 8.1* 8.5* 8.7* 8.8*   HCT 24.7* 25.2* 26.4* 26.6*   MCV 95 93 91 90   MCH 31.3 31.4 30.1 29.6   MCHC 32.8 33.7 33.0 33.1   RDW 15.5* 15.6* 15.9* 15.5*   * 114* 118* 125*     INR  Recent Labs   Lab 05/10/21  0330 05/07/21  0340 05/06/21  0650 05/06/21  0426   INR 1.33* 1.38* 1.45* 2.02*      Hepatic Panel  Recent Labs   Lab 05/11/21  0542 05/10/21  0330 05/09/21  0344 05/08/21  0340   AST 28 34 41 54*   ALT 49 48 49 33   ALKPHOS 73 78 74 48   BILITOTAL 0.6 0.6 0.7 0.9   ALBUMIN 2.8* 2.8* 2.9* 2.9*     GLUCOSE:   Recent Labs   Lab 05/11/21 0542 05/11/21  0341 05/10/21  2130 05/10/21  1730 05/10/21  1213 05/10/21  0801 05/10/21  0330 05/10/21  0007 05/09/21  1721 05/09/21  1721 05/09/21  0344 05/09/21  0344 05/08/21  0340 05/08/21  0340 05/07/21  2226 05/07/21  2226   *  --   --   --   --   --  188*  --   --  250*  --  263*  --  145*  --  100*   BGM  --  163* 183* 230* 143* 193*  --  173*   < >  --    < >  --    < >  --    < >  --     < > = values in this interval not displayed.       "

## 2021-05-11 NOTE — PROGRESS NOTES
"  [facility]   Cardiology II Service / Advanced Heart Failure  Daily Progress Note  Date of Service: 5/11/2021      Patient: Jim Willingham  MRN: [id number]  Admission Date: 2/8/2021  Hospital Day # 92    Assessment and Plan: Jim Willingham is a 65 year old male with history of NICM EF 20% c/b VT s/p CRT-D s/p HMII LVAD 6/19/2017 originally intended as destination therapy 2/2 obesity however with recent weight loss now candidate for transplantation. He is admitted for worsening functional status and renal function concerning for worsening heart failure s/p OHT 5/6/21.    S/p OHT. 5/6/21 secondary to NICM. Echo 5/9/21 conistent with EF 65-70%, mildly decreased RV function, and dilated IVC. Tennyson numbers prior to removal with CVP-16, PCWP-19, MICHELLE CO-5.8, and MICHELLE CI-2.8, and SVR-914.   Immunosuppression: Simulect induction 5/6 and 5/10. Tac 1 mg po BID initiated. Goal 10-12. Prednisone taper. Cellcept 1500 mg po BID.   Serostatus: CMV: D?, R+, EBV: R+, Toxo R-  Prophylaxis: Dapsone, Nystatin, and Valcyte (3-6 months pending donor status).   - Bumex 2 mg IV TID today.   - Terbutaline decreased to 7.5 mg po TID.  - Continue Crestor and ASA.   - RHC/Biopsy, echo, and DSA 5/13/21.    Leukocytosis.   - WBC-11.6 with 90% Neutrophils. No acute concern for infection. Trend.   ================================================================  Interval History/ROS: He denies fever, chills, oral lesions, chest pain, palpitations, cough, nausea, vomiting, diarrhea, and LE edema. He is tolerating oral intake and ambulation.     Last 24 hr care team notes reviewed.   ROS:  4 point ROS including Respiratory, CV, GI and , other than that noted in the HPI, is negative.     Medications: Reviewed in EPIC.     Physical Exam:   /73 (BP Location: Right arm)   Pulse 104   Temp 98.1  F (36.7  C) (Oral)   Resp 16   Ht 1.727 m (5' 8\")   Wt 109.6 kg (241 lb 9.6 oz)   SpO2 96%   BMI 36.74 kg/m    GENERAL: Appears alert " and oriented times three.   HEENT: Eye symmetrical and free of discharge bilaterally. Mucous membranes moist and without lesions.  NECK: Supple and without lymphadenopathy. JVD 12 cm.   CV: Regular, tachycardic. S1S2 present without murmur, rub, or gallop.   RESPIRATORY: Respirations regular, even, and unlabored. Lungs CTA throughout.   GI: Soft and non distended with normoactive bowel sounds present in all quadrants. No tenderness, rebound, guarding. No organomegaly.   EXTREMITIES: Trace bilateral LE peripheral edema. 2+ bilateral pedal pulses.   NEUROLOGIC: Alert and orientated x 3. CN II-XII grossly intact. No focal deficits.   MUSCULOSKELETAL: No joint swelling or tenderness.   SKIN: No jaundice. No rashes or lesions.     Data:  CMP  Recent Labs   Lab 05/11/21  0542 05/10/21  0330 05/09/21  1721 05/09/21  0344 05/08/21  0340 05/08/21  0340 05/07/21  0340 05/07/21  0340 05/06/21  1535 05/06/21  1535 05/06/21  0650 05/06/21  0650 05/04/21  2313 05/04/21  2313    134 134 136  --  137   < > 136   < > 136   < > 139   < > 135   POTASSIUM 4.6 3.8 3.9 3.8   < > 4.2   < > 4.8   < > 5.2   < > 3.8   < > 4.0   CHLORIDE 101 102 102 103  --  105   < > 104   < > 103   < > 105   < > 101   CO2 27 26 24 22  --  21   < > 23   < > 25   < > 21   < > 26   ANIONGAP 6 7 8 12  --  11   < > 9   < > 9   < > 13   < > 7   * 188* 250* 263*  --  145*   < > 153*   < > 201*   < > 162*   < > 115*   BUN 72* 90* 96* 93*  --  82*   < > 68*   < > 58*   < > 50*   < > 50*   CR 1.65* 1.96* 2.14* 2.29*  --  2.53*   < > 2.56*   < > 2.22*   < > 1.69*   < > 1.60*   GFRESTIMATED 43* 35* 32* 29*  --  26*   < > 25*   < > 30*   < > 42*   < > 45*   GFRESTBLACK 50* 41* 37* 34*  --  30*   < > 29*   < > 35*   < > 49*   < > 52*   QAMAR 7.6* 8.0* 7.9* 7.7*  --  7.7*   < > 8.5   < > 8.5   < > 9.3   < > 9.1   MAG  --  2.7*  --   --   --   --   --  3.0*  --  3.0*  --  3.1*   < > 2.3   PHOS  --   --   --   --   --   --   --   --   --  5.7*  --  4.0  --  3.8    PROTTOTAL 5.0* 5.0*  --  5.1*  --  5.1*   < > 5.2*  --   --   --  4.6*  --  7.2   ALBUMIN 2.8* 2.8*  --  2.9*  --  2.9*   < > 3.1*  --   --   --  2.7*  --  4.0   BILITOTAL 0.6 0.6  --  0.7  --  0.9   < > 0.6  --   --   --  0.8  --  0.6   ALKPHOS 73 78  --  74  --  48   < > 46  --   --   --  60  --  111   AST 28 34  --  41  --  54*   < > 97*  --   --   --  Canceled, Test credited  --  47*   ALT 49 48  --  49  --  33   < > 31  --   --   --  30  --  40    < > = values in this interval not displayed.     CBC  Recent Labs   Lab 05/11/21  0542 05/10/21  0330 05/09/21  1721 05/09/21  0344   WBC 11.6* 10.9 10.6 10.3   RBC 2.59* 2.71* 2.89* 2.97*   HGB 8.1* 8.5* 8.7* 8.8*   HCT 24.7* 25.2* 26.4* 26.6*   MCV 95 93 91 90   MCH 31.3 31.4 30.1 29.6   MCHC 32.8 33.7 33.0 33.1   RDW 15.5* 15.6* 15.9* 15.5*   * 114* 118* 125*     INR  Recent Labs   Lab 05/10/21  0330 05/07/21  0340 05/06/21  0650 05/06/21  0426   INR 1.33* 1.38* 1.45* 2.02*       Patient discussed with Dr. Sears.      Soraya [facility]  5/11/2021

## 2021-05-11 NOTE — PLAN OF CARE
Continued intermittent air leak at CT. Sinus tach - low 100s. Using the urinal. Strong pulses. Pain at incision PRN dilaudid/oxy w/ scheduled medications. K recheck in the morning.       Problem: Cardiac Output Decreased (Heart Failure)  Goal: Optimal Cardiac Output  Outcome: Improving

## 2021-05-11 NOTE — PROGRESS NOTES
"SPIRITUAL HEALTH SERVICES  SPIRITUAL ASSESSMENT Progress Note  [facility] ([place]) 6C     Supportive visit with pt, who said he feels he is making good progress, but \"I'm really seeing what a big deal this was - I'm really deconditioned. I certainly realize this will be a long haul to recovery, but overall I'm doing well.\" Prayer and blessing were welcomed.  PLAN: continue to follow pt while he is on unit.    Parviz Early) Jg Smith M.Div., [facility]  Staff   Pager [phone]      * [facility] remains available 24/7 for emergent requests/referrals, either by having the switchboard page the on-call  or by entering an ASAP/STAT consult in Epic (this will also page the on-call ). Routine Epic consults receive an initial response within 24 hours.*      "

## 2021-05-11 NOTE — PLAN OF CARE
D: S/p heart transplant on 5/6. Admitted 2/8 for worsening functional status and renal function concerning for worsening HF. Hx of NICM EF 20% c/b VT s/p CRT-D s/p HM2 SEAN in 2017, CECILIA, DM2, CKD, and COPD.     I: Monitored vitals and assessed pt status.   Changed:  Saline locked double lumen PICC   PRN: dilaudid; oxycodone     A: A0x4, able to express needs. Slept between cares. ST in 110s. Room air. Afebrile. Urinating adequately. Last BM 5/10. Regular diet. CT x5 to -20 suction, CDI. Mediastinal CT has air leak, MD aware. See flow sheets for output. Wound vac to chest incision. Old drive line site packed, dressing CDI. Pacer wires capped. Blood glucose AC/HS. Pain in incision areas, prn dilaudid and oxycodone for some relief. Up with assist of 1. Has tremors in both hands/arms.     P: Continue to monitor Pt status and report changes to CVTS.

## 2021-05-12 ENCOUNTER — HOSPITAL ENCOUNTER (OUTPATIENT)
Facility: CLINIC | Age: 66
End: 2021-05-12
Attending: INTERNAL MEDICINE | Admitting: INTERNAL MEDICINE
Payer: COMMERCIAL

## 2021-05-12 ENCOUNTER — APPOINTMENT (OUTPATIENT)
Dept: GENERAL RADIOLOGY | Facility: CLINIC | Age: 66
DRG: 001 | End: 2021-05-12
Attending: PHYSICIAN ASSISTANT
Payer: COMMERCIAL

## 2021-05-12 ENCOUNTER — APPOINTMENT (OUTPATIENT)
Dept: PHYSICAL THERAPY | Facility: CLINIC | Age: 66
DRG: 001 | End: 2021-05-12
Attending: INTERNAL MEDICINE
Payer: COMMERCIAL

## 2021-05-12 ENCOUNTER — HOSPITAL ENCOUNTER (OUTPATIENT)
Facility: CLINIC | Age: 66
End: 2021-05-12
Attending: INTERNAL MEDICINE | Admitting: INTERNAL MEDICINE
Payer: MEDICARE

## 2021-05-12 ENCOUNTER — APPOINTMENT (OUTPATIENT)
Dept: OCCUPATIONAL THERAPY | Facility: CLINIC | Age: 66
DRG: 001 | End: 2021-05-12
Attending: INTERNAL MEDICINE
Payer: COMMERCIAL

## 2021-05-12 DIAGNOSIS — Z94.1 TRANSPLANTED HEART (H): Primary | ICD-10-CM

## 2021-05-12 DIAGNOSIS — Z94.1 TRANSPLANTED HEART (H): ICD-10-CM

## 2021-05-12 LAB
ALBUMIN SERPL-MCNC: 2.7 G/DL (ref 3.4–5)
ALP SERPL-CCNC: 71 U/L (ref 40–150)
ALT SERPL W P-5'-P-CCNC: 52 U/L (ref 0–70)
ANION GAP SERPL CALCULATED.3IONS-SCNC: 4 MMOL/L (ref 3–14)
ANION GAP SERPL CALCULATED.3IONS-SCNC: 4 MMOL/L (ref 3–14)
AST SERPL W P-5'-P-CCNC: 33 U/L (ref 0–45)
BILIRUB SERPL-MCNC: 0.5 MG/DL (ref 0.2–1.3)
BUN SERPL-MCNC: 52 MG/DL (ref 7–30)
BUN SERPL-MCNC: 54 MG/DL (ref 7–30)
CALCIUM SERPL-MCNC: 7.6 MG/DL (ref 8.5–10.1)
CALCIUM SERPL-MCNC: 7.8 MG/DL (ref 8.5–10.1)
CHLORIDE SERPL-SCNC: 100 MMOL/L (ref 94–109)
CHLORIDE SERPL-SCNC: 100 MMOL/L (ref 94–109)
CO2 SERPL-SCNC: 27 MMOL/L (ref 20–32)
CO2 SERPL-SCNC: 28 MMOL/L (ref 20–32)
CREAT SERPL-MCNC: 1.4 MG/DL (ref 0.66–1.25)
CREAT SERPL-MCNC: 1.48 MG/DL (ref 0.66–1.25)
ERYTHROCYTE [DISTWIDTH] IN BLOOD BY AUTOMATED COUNT: 15.3 % (ref 10–15)
GFR SERPL CREATININE-BSD FRML MDRD: 49 ML/MIN/{1.73_M2}
GFR SERPL CREATININE-BSD FRML MDRD: 53 ML/MIN/{1.73_M2}
GLUCOSE BLDC GLUCOMTR-MCNC: 172 MG/DL (ref 70–99)
GLUCOSE BLDC GLUCOMTR-MCNC: 172 MG/DL (ref 70–99)
GLUCOSE BLDC GLUCOMTR-MCNC: 195 MG/DL (ref 70–99)
GLUCOSE BLDC GLUCOMTR-MCNC: 198 MG/DL (ref 70–99)
GLUCOSE BLDC GLUCOMTR-MCNC: 202 MG/DL (ref 70–99)
GLUCOSE SERPL-MCNC: 180 MG/DL (ref 70–99)
GLUCOSE SERPL-MCNC: 185 MG/DL (ref 70–99)
HCT VFR BLD AUTO: 23.3 % (ref 40–53)
HGB BLD-MCNC: 7.3 G/DL (ref 13.3–17.7)
INR PPP: 1.19 (ref 0.86–1.14)
LABORATORY COMMENT REPORT: NORMAL
MAGNESIUM SERPL-MCNC: 2.3 MG/DL (ref 1.6–2.3)
MCH RBC QN AUTO: 29.8 PG (ref 26.5–33)
MCHC RBC AUTO-ENTMCNC: 31.3 G/DL (ref 31.5–36.5)
MCV RBC AUTO: 95 FL (ref 78–100)
PLATELET # BLD AUTO: 148 10E9/L (ref 150–450)
POTASSIUM SERPL-SCNC: 4.6 MMOL/L (ref 3.4–5.3)
POTASSIUM SERPL-SCNC: 4.7 MMOL/L (ref 3.4–5.3)
PROT SERPL-MCNC: 4.9 G/DL (ref 6.8–8.8)
RBC # BLD AUTO: 2.45 10E12/L (ref 4.4–5.9)
SARS-COV-2 RNA RESP QL NAA+PROBE: NEGATIVE
SODIUM SERPL-SCNC: 132 MMOL/L (ref 133–144)
SODIUM SERPL-SCNC: 132 MMOL/L (ref 133–144)
SPECIMEN SOURCE: NORMAL
UFH PPP CHRO-ACNC: <0.1 IU/ML
WBC # BLD AUTO: 11.1 10E9/L (ref 4–11)

## 2021-05-12 PROCEDURE — 250N000013 HC RX MED GY IP 250 OP 250 PS 637: Performed by: STUDENT IN AN ORGANIZED HEALTH CARE EDUCATION/TRAINING PROGRAM

## 2021-05-12 PROCEDURE — 36592 COLLECT BLOOD FROM PICC: CPT | Performed by: PHYSICIAN ASSISTANT

## 2021-05-12 PROCEDURE — 80053 COMPREHEN METABOLIC PANEL: CPT | Performed by: STUDENT IN AN ORGANIZED HEALTH CARE EDUCATION/TRAINING PROGRAM

## 2021-05-12 PROCEDURE — 80048 BASIC METABOLIC PNL TOTAL CA: CPT | Performed by: PHYSICIAN ASSISTANT

## 2021-05-12 PROCEDURE — 85520 HEPARIN ASSAY: CPT | Performed by: PHYSICIAN ASSISTANT

## 2021-05-12 PROCEDURE — 214N000001 HC R&B CCU UMMC

## 2021-05-12 PROCEDURE — 250N000011 HC RX IP 250 OP 636: Performed by: STUDENT IN AN ORGANIZED HEALTH CARE EDUCATION/TRAINING PROGRAM

## 2021-05-12 PROCEDURE — 97530 THERAPEUTIC ACTIVITIES: CPT | Mod: GO

## 2021-05-12 PROCEDURE — 250N000013 HC RX MED GY IP 250 OP 250 PS 637: Performed by: SURGERY

## 2021-05-12 PROCEDURE — U0005 INFEC AGEN DETEC AMPLI PROBE: HCPCS | Performed by: PHYSICIAN ASSISTANT

## 2021-05-12 PROCEDURE — 250N000012 HC RX MED GY IP 250 OP 636 PS 637: Performed by: NURSE PRACTITIONER

## 2021-05-12 PROCEDURE — 250N000013 HC RX MED GY IP 250 OP 250 PS 637: Performed by: PHYSICIAN ASSISTANT

## 2021-05-12 PROCEDURE — 80053 COMPREHEN METABOLIC PANEL: CPT | Performed by: PHYSICIAN ASSISTANT

## 2021-05-12 PROCEDURE — 85610 PROTHROMBIN TIME: CPT | Performed by: PHYSICIAN ASSISTANT

## 2021-05-12 PROCEDURE — 250N000013 HC RX MED GY IP 250 OP 250 PS 637: Performed by: NURSE PRACTITIONER

## 2021-05-12 PROCEDURE — 97116 GAIT TRAINING THERAPY: CPT | Mod: GP | Performed by: PHYSICAL THERAPIST

## 2021-05-12 PROCEDURE — 250N000011 HC RX IP 250 OP 636: Performed by: NURSE PRACTITIONER

## 2021-05-12 PROCEDURE — 97110 THERAPEUTIC EXERCISES: CPT | Mod: GO

## 2021-05-12 PROCEDURE — 250N000012 HC RX MED GY IP 250 OP 636 PS 637: Performed by: STUDENT IN AN ORGANIZED HEALTH CARE EDUCATION/TRAINING PROGRAM

## 2021-05-12 PROCEDURE — 36592 COLLECT BLOOD FROM PICC: CPT | Performed by: NURSE PRACTITIONER

## 2021-05-12 PROCEDURE — 71046 X-RAY EXAM CHEST 2 VIEWS: CPT

## 2021-05-12 PROCEDURE — 71045 X-RAY EXAM CHEST 1 VIEW: CPT | Mod: 26 | Performed by: RADIOLOGY

## 2021-05-12 PROCEDURE — 250N000011 HC RX IP 250 OP 636: Performed by: PHYSICIAN ASSISTANT

## 2021-05-12 PROCEDURE — 99233 SBSQ HOSP IP/OBS HIGH 50: CPT | Performed by: NURSE PRACTITIONER

## 2021-05-12 PROCEDURE — 83735 ASSAY OF MAGNESIUM: CPT | Performed by: PHYSICIAN ASSISTANT

## 2021-05-12 PROCEDURE — 71046 X-RAY EXAM CHEST 2 VIEWS: CPT | Mod: 26 | Performed by: RADIOLOGY

## 2021-05-12 PROCEDURE — 97530 THERAPEUTIC ACTIVITIES: CPT | Mod: GP | Performed by: PHYSICAL THERAPIST

## 2021-05-12 PROCEDURE — 84132 ASSAY OF SERUM POTASSIUM: CPT | Performed by: PHYSICIAN ASSISTANT

## 2021-05-12 PROCEDURE — 999N001017 HC STATISTIC GLUCOSE BY METER IP

## 2021-05-12 PROCEDURE — 80048 BASIC METABOLIC PNL TOTAL CA: CPT | Performed by: NURSE PRACTITIONER

## 2021-05-12 PROCEDURE — 71045 X-RAY EXAM CHEST 1 VIEW: CPT

## 2021-05-12 PROCEDURE — 250N000012 HC RX MED GY IP 250 OP 636 PS 637: Performed by: SURGERY

## 2021-05-12 PROCEDURE — U0003 INFECTIOUS AGENT DETECTION BY NUCLEIC ACID (DNA OR RNA); SEVERE ACUTE RESPIRATORY SYNDROME CORONAVIRUS 2 (SARS-COV-2) (CORONAVIRUS DISEASE [COVID-19]), AMPLIFIED PROBE TECHNIQUE, MAKING USE OF HIGH THROUGHPUT TECHNOLOGIES AS DESCRIBED BY CMS-2020-01-R: HCPCS | Performed by: PHYSICIAN ASSISTANT

## 2021-05-12 PROCEDURE — 85027 COMPLETE CBC AUTOMATED: CPT | Performed by: PHYSICIAN ASSISTANT

## 2021-05-12 RX ORDER — BUMETANIDE 0.25 MG/ML
3 INJECTION INTRAMUSCULAR; INTRAVENOUS 2 TIMES DAILY
Status: DISCONTINUED | OUTPATIENT
Start: 2021-05-12 | End: 2021-05-14

## 2021-05-12 RX ORDER — TERBUTALINE SULFATE 5 MG/1
5 TABLET ORAL EVERY 8 HOURS SCHEDULED
Status: DISCONTINUED | OUTPATIENT
Start: 2021-05-12 | End: 2021-05-12

## 2021-05-12 RX ORDER — TERBUTALINE SULFATE 5 MG/1
5 TABLET ORAL EVERY 8 HOURS SCHEDULED
Status: DISCONTINUED | OUTPATIENT
Start: 2021-05-12 | End: 2021-05-13

## 2021-05-12 RX ORDER — VALGANCICLOVIR 450 MG/1
900 TABLET, FILM COATED ORAL DAILY
Status: DISCONTINUED | OUTPATIENT
Start: 2021-05-13 | End: 2021-05-16

## 2021-05-12 RX ADMIN — NYSTATIN 1000000 UNITS: 500000 SUSPENSION ORAL at 08:47

## 2021-05-12 RX ADMIN — HEPARIN SODIUM 5000 UNITS: 5000 INJECTION, SOLUTION INTRAVENOUS; SUBCUTANEOUS at 03:45

## 2021-05-12 RX ADMIN — ONDANSETRON 4 MG: 2 INJECTION INTRAMUSCULAR; INTRAVENOUS at 08:23

## 2021-05-12 RX ADMIN — CYCLOBENZAPRINE 5 MG: 5 TABLET, FILM COATED ORAL at 13:32

## 2021-05-12 RX ADMIN — MYCOPHENOLATE MOFETIL 1500 MG: 250 CAPSULE ORAL at 20:34

## 2021-05-12 RX ADMIN — PREDNISONE 35 MG: 20 TABLET ORAL at 20:35

## 2021-05-12 RX ADMIN — SODIUM CHLORIDE, PRESERVATIVE FREE 5 ML: 5 INJECTION INTRAVENOUS at 09:00

## 2021-05-12 RX ADMIN — TERBUTALINE SULFATE 5 MG: 5 TABLET ORAL at 20:33

## 2021-05-12 RX ADMIN — ACETAMINOPHEN 975 MG: 325 TABLET, FILM COATED ORAL at 21:57

## 2021-05-12 RX ADMIN — TERBUTALINE SULFATE 7.5 MG: 2.5 TABLET ORAL at 03:45

## 2021-05-12 RX ADMIN — SODIUM CHLORIDE, PRESERVATIVE FREE 5 ML: 5 INJECTION INTRAVENOUS at 17:30

## 2021-05-12 RX ADMIN — HEPARIN SODIUM 5000 UNITS: 5000 INJECTION, SOLUTION INTRAVENOUS; SUBCUTANEOUS at 13:33

## 2021-05-12 RX ADMIN — ACETAMINOPHEN 975 MG: 325 TABLET, FILM COATED ORAL at 14:22

## 2021-05-12 RX ADMIN — TACROLIMUS 1 MG: 1 CAPSULE ORAL at 08:29

## 2021-05-12 RX ADMIN — CYCLOBENZAPRINE 5 MG: 5 TABLET, FILM COATED ORAL at 00:05

## 2021-05-12 RX ADMIN — INSULIN ASPART 4 UNITS: 100 INJECTION, SOLUTION INTRAVENOUS; SUBCUTANEOUS at 14:37

## 2021-05-12 RX ADMIN — BUPROPION HYDROCHLORIDE 100 MG: 100 TABLET, EXTENDED RELEASE ORAL at 08:28

## 2021-05-12 RX ADMIN — TERBUTALINE SULFATE 5 MG: 5 TABLET ORAL at 14:21

## 2021-05-12 RX ADMIN — OXYCODONE HYDROCHLORIDE 5 MG: 5 TABLET ORAL at 21:57

## 2021-05-12 RX ADMIN — BUMETANIDE 3 MG: 0.25 INJECTION INTRAMUSCULAR; INTRAVENOUS at 16:41

## 2021-05-12 RX ADMIN — DAPSONE 50 MG: 25 TABLET ORAL at 08:28

## 2021-05-12 RX ADMIN — HYDROMORPHONE HYDROCHLORIDE 0.4 MG: 1 INJECTION, SOLUTION INTRAMUSCULAR; INTRAVENOUS; SUBCUTANEOUS at 20:28

## 2021-05-12 RX ADMIN — NYSTATIN 1000000 UNITS: 500000 SUSPENSION ORAL at 20:35

## 2021-05-12 RX ADMIN — SODIUM CHLORIDE, PRESERVATIVE FREE 5 ML: 5 INJECTION INTRAVENOUS at 06:16

## 2021-05-12 RX ADMIN — PREDNISONE 40 MG: 20 TABLET ORAL at 08:28

## 2021-05-12 RX ADMIN — Medication 10 MG: at 21:57

## 2021-05-12 RX ADMIN — HYDROMORPHONE HYDROCHLORIDE 0.4 MG: 1 INJECTION, SOLUTION INTRAMUSCULAR; INTRAVENOUS; SUBCUTANEOUS at 00:05

## 2021-05-12 RX ADMIN — ALLOPURINOL 300 MG: 300 TABLET ORAL at 08:29

## 2021-05-12 RX ADMIN — UMECLIDINIUM 1 PUFF: 62.5 AEROSOL, POWDER ORAL at 08:54

## 2021-05-12 RX ADMIN — PANTOPRAZOLE SODIUM 40 MG: 40 TABLET, DELAYED RELEASE ORAL at 08:29

## 2021-05-12 RX ADMIN — ACETAMINOPHEN 975 MG: 325 TABLET, FILM COATED ORAL at 06:23

## 2021-05-12 RX ADMIN — HEPARIN SODIUM 5000 UNITS: 5000 INJECTION, SOLUTION INTRAVENOUS; SUBCUTANEOUS at 20:34

## 2021-05-12 RX ADMIN — GABAPENTIN 600 MG: 300 CAPSULE ORAL at 14:21

## 2021-05-12 RX ADMIN — BUPROPION HYDROCHLORIDE 100 MG: 100 TABLET, EXTENDED RELEASE ORAL at 20:33

## 2021-05-12 RX ADMIN — Medication 50 MG: at 00:36

## 2021-05-12 RX ADMIN — NYSTATIN 1000000 UNITS: 500000 SUSPENSION ORAL at 16:40

## 2021-05-12 RX ADMIN — INSULIN ASPART 2 UNITS: 100 INJECTION, SOLUTION INTRAVENOUS; SUBCUTANEOUS at 08:48

## 2021-05-12 RX ADMIN — ROSUVASTATIN CALCIUM 10 MG: 10 TABLET, FILM COATED ORAL at 08:29

## 2021-05-12 RX ADMIN — VALGANCICLOVIR 450 MG: 450 TABLET, FILM COATED ORAL at 08:28

## 2021-05-12 RX ADMIN — DOCUSATE SODIUM 50 MG AND SENNOSIDES 8.6 MG 1 TABLET: 8.6; 5 TABLET, FILM COATED ORAL at 20:38

## 2021-05-12 RX ADMIN — HYDROMORPHONE HYDROCHLORIDE 0.4 MG: 1 INJECTION, SOLUTION INTRAMUSCULAR; INTRAVENOUS; SUBCUTANEOUS at 08:23

## 2021-05-12 RX ADMIN — NYSTATIN 1000000 UNITS: 500000 SUSPENSION ORAL at 13:31

## 2021-05-12 RX ADMIN — SENNOSIDES AND DOCUSATE SODIUM 1 TABLET: 8.6; 5 TABLET ORAL at 08:49

## 2021-05-12 RX ADMIN — BUMETANIDE 3 MG: 0.25 INJECTION INTRAMUSCULAR; INTRAVENOUS at 09:00

## 2021-05-12 RX ADMIN — POLYETHYLENE GLYCOL 3350 17 G: 17 POWDER, FOR SOLUTION ORAL at 20:34

## 2021-05-12 RX ADMIN — Medication 10 ML: at 14:22

## 2021-05-12 RX ADMIN — DOCUSATE SODIUM 100 MG: 100 CAPSULE, LIQUID FILLED ORAL at 20:35

## 2021-05-12 RX ADMIN — TACROLIMUS 1 MG: 1 CAPSULE ORAL at 18:49

## 2021-05-12 RX ADMIN — Medication 50 MG: at 21:07

## 2021-05-12 RX ADMIN — FLUTICASONE FUROATE AND VILANTEROL TRIFENATATE 1 PUFF: 100; 25 POWDER RESPIRATORY (INHALATION) at 08:49

## 2021-05-12 RX ADMIN — INSULIN ASPART 2 UNITS: 100 INJECTION, SOLUTION INTRAVENOUS; SUBCUTANEOUS at 20:36

## 2021-05-12 RX ADMIN — HYDROMORPHONE HYDROCHLORIDE 0.2 MG: 0.2 INJECTION, SOLUTION INTRAMUSCULAR; INTRAVENOUS; SUBCUTANEOUS at 13:05

## 2021-05-12 RX ADMIN — Medication 1 TABLET: at 13:32

## 2021-05-12 RX ADMIN — MYCOPHENOLATE MOFETIL 1500 MG: 250 CAPSULE ORAL at 08:27

## 2021-05-12 RX ADMIN — MONTELUKAST 10 MG: 10 TABLET, FILM COATED ORAL at 21:57

## 2021-05-12 RX ADMIN — GABAPENTIN 600 MG: 300 CAPSULE ORAL at 21:57

## 2021-05-12 RX ADMIN — Medication 1 TABLET: at 20:35

## 2021-05-12 RX ADMIN — ASPIRIN 81 MG CHEWABLE TABLET 81 MG: 81 TABLET CHEWABLE at 08:29

## 2021-05-12 RX ADMIN — Medication 37.5 MG: at 21:57

## 2021-05-12 RX ADMIN — POLYETHYLENE GLYCOL 3350 17 G: 17 POWDER, FOR SOLUTION ORAL at 08:50

## 2021-05-12 RX ADMIN — SENNOSIDES AND DOCUSATE SODIUM 1 TABLET: 8.6; 5 TABLET ORAL at 20:33

## 2021-05-12 RX ADMIN — DOCUSATE SODIUM 100 MG: 100 CAPSULE, LIQUID FILLED ORAL at 08:50

## 2021-05-12 RX ADMIN — GABAPENTIN 300 MG: 300 CAPSULE ORAL at 08:29

## 2021-05-12 ASSESSMENT — ACTIVITIES OF DAILY LIVING (ADL)
ADLS_ACUITY_SCORE: 16
ADLS_ACUITY_SCORE: 14
ADLS_ACUITY_SCORE: 16
ADLS_ACUITY_SCORE: 15
ADLS_ACUITY_SCORE: 14
ADLS_ACUITY_SCORE: 14

## 2021-05-12 ASSESSMENT — MIFFLIN-ST. JEOR: SCORE: 1871.29

## 2021-05-12 NOTE — DISCHARGE INSTRUCTIONS
AFTER YOU GO HOME FROM YOUR HEART SURGERY  Heart Transplant/LVAD explant 5/6/21 by Dr. Quigley    You had a sternotomy, avoid lifting anything greater than ten pounds for 6 weeks after surgery and then less than 20 pounds for an additional 6 weeks. Do not reach backwards or use arms to push out of chair. Do not let people pull on your arms to assist with standing. Avoid twisting or reaching too far across your body.  Avoid strenuous activities such as bowling, vacuuming, raking, shoveling, golf or tennis for 12 weeks after your surgery. It is okay to resume sex if you feel comfortable in doing so. You may have to try different positions with your partner.  Splint your chest incision by hugging a pillow or bringing your arms across your chest when coughing or sneezing. Sleep on your back for 6 weeks.     No driving for 4 weeks after surgery or while on pain medication.     Shower or wash your incisions daily with soap and water (or as instructed), pat dry. Keep wound clean and dry, showers are okay after discharge, but don't let spray hit directly on incision. No baths or swimming for 1 month. Cover chest tube sites with gauze until they stop draining, then leave open to air. It is not abnormal for chest tube sites to drain yellowish/clear fluid for up to 2-3 weeks after surgery.   Watch for signs of infection: increased redness, tenderness, warmth or any drainage that appears infected (pus like) or is persistent.  Also a temperature > 100.5 F or chills. Call your surgeon or primary care provider's office immediately. Remove any skin glue left on incisions after 10-14 days. This will not affect your incision and can speed up healing.    Exercise is very important in your recovery. Please follow the guidelines set up for you in your cardiac rehab classes at the hospital. If outpatient cardiac rehab was ordered for you, we highly recommend you participate. If you have problems arranging your cardiac rehab, please call  "[phone] for all locations, with the exception of [place], please call [phone] and [place], please call [phone].    Avoid sitting for prolonged periods of time, try to walk every hour during the day. If you have a leg incision, elevate your leg often when you are not walking.    Check your weight when you get home from the hospital and continue to check it daily through your recovery for at least a month. If you notice a weight gain of 2-3 pounds in a week, notify your primary care physician, cardiologist or surgeon.    Bowel activity may be slow after surgery. If necessary, you may take an over the counter laxative such as Milk of Magnesia or Miralax. You may have stool softeners prescribed (docusate sodium, Senokot). We recommend using stool softeners while using narcotics for pain (oxycodone/percocet, hydrocodone/vicodin, hydromorphone/dilaudid).      Wean OFF of narcotics (oxycodone, dilaudid, hydrocodone) as soon as possible. You should continue taking acetaminophen as long as you have any surgical pain as the first choice for pain control and add narcotics as necessary for pain to be tolerable.      DENTAL VISITS AFTER SURGERY  If you have had your heart valve repaired or replaced, we do not recommend having any dental work done for 6 months and you will need to take an antibiotic prior to dental visits from now on.  Please notify your dentist before any procedure for the proper treatment needed. The antibiotic is taken by mouth one hour prior to visit. This includes routine cleanings.  You can sometimes hear a mechanical valve \"clicking,\" this is normal and not a sign of something wrong.    DO NOT SMOKE.  IF YOU NEED HELP QUITTING, PLEASE TALK WITH YOUR CARDIOLOGIST OR PRIMARY DOCTOR.      You had a heart transplant, so call your Transplant Coordinator with all questions or concerns.       REGARDING PRESCRIPTION REFILLS.  If you need a refill on your pain medication contact us to discuss your " pain and a possible one time refill.   All other medications will be adjusted, discontinued and re-filled by your primary care physician and/or your cardiologist as they were prior to your surgery. We have given you enough for one to three month with possibly one refill.    POST-OPERATIVE CLINIC VISITS  You have a follow up visits with Cardiology and Surgery scheduled per your coordinator. Future medication refills should come from your Cardiologist. You have a lab appointment on Wednesday 6/2. A transplant coordinator will call you to help arrange appointment.     If there is a need to return to see CT Surgery please call our  at [phone].    SURGICAL QUESTIONS  Please call Gayla Reyes or Xiomara Diallo with surgical recovery and medication questions, their phone numbers are listed below.  They will assist you with your needs and contact other surgery care team members as indicated.    On weekends or after hours, please call [phone] and ask the  to   page the Cardiothoracic Surgery fellow on call.      Thank you,    Your Cardiothoracic Surgery Team  Gayla Reyes RN Care Coordinator-  [phone]   Xiomara Diallo RN Care Coordinator-  [phone]            Nutrition  Diet: Regular diet as tolerated, or as recommended by your team/doctors  *Post-Transplant Diet Guidelines - Follow recommendations on the Guide to Your Diet after Transplant handout (summary below).    -  Maintain a healthy weight.  -  Eat a heart-healthy diet (low sodium, low saturated and trans-fat) once your appetite improves to normal.  -  Control blood sugar.  -  Limit sodium (salt).  -  Take calcium and vitamin D supplement if your doctor or team orders.  -  Eat more protein for six to eight weeks after transplant.    -  Prevent food poisoning, store and prepare foods to the proper temperature, practice good handwashing, heat all deli meat (to 165 degrees Fahrenheit), avoid raw fish and meats (including uncooked eggs,  non-pasteurized or raw milk, and non-pasteurized or raw cheeses including brie, camembert, blue-veined cheese, and Mexican queso fresco), and throw out leftovers older than two days.   -  In some cases (but not in all cases), adjust potassium intake.

## 2021-05-12 NOTE — PLAN OF CARE
Feeling more weak. Tremors at bilateral UE. Sinus tach. Okay appetite. Pleural CT container fell over. Changed container (documented). C/O pain PRN oxy/dilaudid. Chest xray tomorrow. Voided around 800 ml.       Problem: Functional Ability Impaired (Heart Failure)  Goal: Optimal Functional Ability  Outcome: Improving  Intervention: Optimize Functional Ability  Recent Flowsheet Documentation  Taken 5/11/2021 1651 by Belkys Bhatia, RN  Activity Management: activity adjusted per tolerance  Taken 5/11/2021 1513 by Belkys Bhatia, RN  Activity Management:   activity adjusted per tolerance   activity encouraged

## 2021-05-12 NOTE — PROGRESS NOTES
[facility]   Cardiology II Service / Advanced Heart Failure  Daily Progress Note  Date of Service: 5/12/2021      Patient: Jim Willingham  MRN: [id number]  Admission Date: 2/8/2021  Hospital Day # 93    Assessment and Plan: Jim Willingham is a 65 year old male with history of NICM EF 20% c/b VT s/p CRT-D s/p HMII LVAD 6/19/2017 originally intended as destination therapy 2/2 obesity however with recent weight loss now candidate for transplantation. He is admitted for worsening functional status and renal function concerning for worsening heart failure s/p OHT 5/6/21.    Recommendations today:   - Decrease Terbutaline 5 mg po TID.   - Tac level in AM.   - RHC/biopsy, ECHO and DSA in AM.      S/p OHT. 5/6/21 secondary to NICM. Echo 5/9/21 conistent with EF 65-70%, mildly decreased RV function, and dilated IVC. [place] numbers prior to removal with CVP-16, PCWP-19, MICHELLE CO-5.8, and MICHELLE CI-2.8, and SVR-914.   Immunosuppression: Simulect induction 5/6 and 5/10. Tac 1 mg po BID, level in AM. Goal 10-12. Prednisone taper. Cellcept 1500 mg po BID.   Serostatus: CMV: D?, R+, EBV: R+, Toxo R-  Prophylaxis: Dapsone, Nystatin, and Valcyte (3-6 months pending donor status).   - Bumex increased to 3 mg IV BID.   - Terbutaline decreased to 5 mg po TID.   - Continue Crestor and ASA.   - RHC/Biopsy, echo, and DSA in AM.      Leukocytosis, stable.   - WBC-11.1 (11.6).   - Chest X-ray pending given cough.   ================================================================    Interval History/ROS: He complains of nausea with meds yesterday and emesis, but assured me this was 2 hours after med ingestion. He denies fever, chills, chest pain, palpitations, diarrhea, and LE edema. He notes mild productive cough, unchanged. He is tolerating oral intake today.     Last 24 hr care team notes reviewed.   ROS:  4 point ROS including Respiratory, CV, GI and , other than that noted in the HPI, is negative.     Medications: Reviewed  "in [facility].     Physical Exam:   /66   Pulse 102   Temp 98.2  F (36.8  C)   Resp 18   Ht 1.727 m (5' 8\")   Wt 110.2 kg (242 lb 14.4 oz)   SpO2 97%   BMI 36.93 kg/m    GENERAL: Appears alert and oriented times three.   HEENT: Eye symmetrical and free of discharge bilaterally. Mucous membranes moist and without lesions.  NECK: Supple and without lymphadenopathy. JVD 10 cm    CV: Regular, tachycardic. S1S2 present without murmur, rub, or gallop.   RESPIRATORY: Respirations regular, even, and unlabored. Lungs CTA throughout.   GI: Soft and non distended with normoactive bowel sounds present in all quadrants. No tenderness, rebound, guarding. No organomegaly.   EXTREMITIES: +1 bilateral LE peripheral edema. 2+ bilateral pedal pulses.   NEUROLOGIC: Alert and orientated x 3. CN II-XII grossly intact. No focal deficits.   MUSCULOSKELETAL: No joint swelling or tenderness.   SKIN: No jaundice. No rashes or lesions. Chest CDI.     Data:  CMP  Recent Labs   Lab 05/12/21  0622 05/11/21  1752 05/11/21  0542 05/10/21  0330 05/09/21  0344 05/09/21  0344 05/07/21  0340 05/07/21  0340 05/06/21  1535 05/06/21  1535 05/06/21  0650 05/06/21  0650   * 131* 134 134   < > 136   < > 136   < > 136   < > 139   POTASSIUM 4.7 4.8 4.6 3.8   < > 3.8   < > 4.8   < > 5.2   < > 3.8   CHLORIDE 100 97 101 102   < > 103   < > 104   < > 103   < > 105   CO2 27 28 27 26   < > 22   < > 23   < > 25   < > 21   ANIONGAP 4 6 6 7   < > 12   < > 9   < > 9   < > 13   * 154* 158* 188*   < > 263*   < > 153*   < > 201*   < > 162*   BUN 54* 66* 72* 90*   < > 93*   < > 68*   < > 58*   < > 50*   CR 1.40* 1.53* 1.65* 1.96*   < > 2.29*   < > 2.56*   < > 2.22*   < > 1.69*   GFRESTIMATED 53* 47* 43* 35*   < > 29*   < > 25*   < > 30*   < > 42*   GFRESTBLACK 61 55* 50* 41*   < > 34*   < > 29*   < > 35*   < > 49*   QAMAR 7.8* 8.3* 7.6* 8.0*   < > 7.7*   < > 8.5   < > 8.5   < > 9.3   MAG 2.3  --   --  2.7*  --   --   --  3.0*  --  3.0*  --  3.1*   PHOS  -- "   --   --   --   --   --   --   --   --  5.7*  --  4.0   PROTTOTAL 4.9*  --  5.0* 5.0*  --  5.1*   < > 5.2*  --   --   --  4.6*   ALBUMIN 2.7*  --  2.8* 2.8*  --  2.9*   < > 3.1*  --   --   --  2.7*   BILITOTAL 0.5  --  0.6 0.6  --  0.7   < > 0.6  --   --   --  0.8   ALKPHOS 71  --  73 78  --  74   < > 46  --   --   --  60   AST 33  --  28 34  --  41   < > 97*  --   --   --  Canceled, Test credited   ALT 52  --  49 48  --  49   < > 31  --   --   --  30    < > = values in this interval not displayed.     CBC  Recent Labs   Lab 05/12/21  0622 05/11/21  0542 05/10/21  0330 05/09/21  1721   WBC 11.1* 11.6* 10.9 10.6   RBC 2.45* 2.59* 2.71* 2.89*   HGB 7.3* 8.1* 8.5* 8.7*   HCT 23.3* 24.7* 25.2* 26.4*   MCV 95 95 93 91   MCH 29.8 31.3 31.4 30.1   MCHC 31.3* 32.8 33.7 33.0   RDW 15.3* 15.5* 15.6* 15.9*   * 130* 114* 118*     INR  Recent Labs   Lab 05/12/21  0622 05/10/21  0330 05/07/21  0340 05/06/21  0650   INR 1.19* 1.33* 1.38* 1.45*       Patient discussed with Dr. Sears.      Soraya Marshall [facility]  5/12/2021

## 2021-05-12 NOTE — PLAN OF CARE
D: S/p heart transplant on 5/6. Admitted 2/8 for worsening functional status and renal function concerning for worsening HF. Hx of NICM EF 20% c/b VT s/p CRT-D s/p HM2 SEAN in 2017, CECILIA, DM2, CKD, and COPD     I: Monitored vitals and assessed pt status.   PRN: Flexeril, IV Dilaudid, Tramadol  Tele: -110's  O2: RA  Mobility: Ax2 w/ walker/GB     A: A0x4. Pt had an episode of delirium overnight and yelling out. Easily redirectable/reorienatated. Drowsy this AM. Pt requesting pain meds to be stacked. Consider spacing out pain meds. BUE tremors. VSS. TPW capped. CTx5 to suction. + air leak in meds. Afebrile. Urinating adequately. LBM 5/10. Wound vac over sternal incision. Old DL packing dressing CDI. C/o incisional pain. Able to make needs known.     P: Continue to monitor Pt status and report changes to CVTS.

## 2021-05-12 NOTE — PROGRESS NOTES
CLINICAL NUTRITION SERVICES - REASSESSMENT NOTE     Nutrition Prescription    RECOMMENDATIONS FOR MDs/PROVIDERS TO ORDER:  Consider appetite stimulate if needed.     Malnutrition Status:    Non-severe malnutrition in the context of acute on chronic illness    Recommendations already ordered by Registered Dietitian (RD):  1. Calcium Citrate containing vitamin D supplementation as patient is on prednisone    Future/Additional Recommendations:  1. Diet order per team  2. Monitor glycemic control. DM 2. Hgb A1c of 6.2 on 1/7/21.   3. Bowel med regimen per team   4. Consider checking thiamine, folic acid, and vitamin B12 labs (vit B12 lab was WNL, 450 on 1/22/21).   5. Monitor iron status.  6. Order the following (Sylvester-en-y Gastric Bypass) if pt agreeable:    Multivitamin/minerals: adult dose 2 times daily    Iron: 45-60 mg elemental daily (18-36 mg daily if low risk) - may partly or fully be covered in multivitamin     Calcium Citrate containing vitamin D: Consider additional     Vitamin B12: sublingual form of at least 500 mcg daily or injection of 1000 mcg monthly     B-50 Complex daily  7. Watch potassium trends      EVALUATION OF THE PROGRESS TOWARD GOALS   Diet: Regular diet + Deli [place] @ 10am,  2 Glucerna @ 2 pm (Chocolate and Butter Pecan) -- Pt was advanced to regular diet on 5/8.   Intake: Per flowhseet record, pt with variable intake, consuming % of meals, ordering 1-2 meals per day + snacks. Fair appetite with small intake noted per nursing. Per pt, states he has had little appetite since txp on 5/6. States he is experiencing some taste changes, early satiety, and is unimpressed with menu items available to order. Pt states he tried to eat some fruit yesterday and had an episode of emesis following, however was able to consume one oral supplement and some broth.      NEW FINDINGS   Weight: 118.4 kg (2/13/20), 106.2 kg (8/7/20), 103.9 kg (11/13/20), 94.8 kg (1/12/21), 99.5 kg (2/8/21, admit), 100.2  kg (2/15/21), 98.2 kg (2/22), 100 kg (3/2), 99.7 kg (3/11), 99.9 kg (4/9), 101.8 kg (4/26), 102.3 kg (5/5), 110.2 kg (5/12/21) -- Weight has trended up over the last week, likely due to fluid shifts post-op.   Labs: Reviewed   Meds: Reviewed   GI: Last BM 5/10. Having 0-2 stools per day.   Resp: Extubated 5/7.     Dosing Weight: 76 kg (adjusted, based on lowest wt so far this admission of 96.6 kg on 2/20)     UPDATED ASSESSED NUTRITION NEEDS (for inpatient hospital stay)   Estimated Energy Needs: 9456-7025 kcals/day (30-35 kcals/kg)   Justification: Increased needs, post txp - recommend low end of this range  Estimated Protein Needs:  grams protein/day (1.3-1.5+ grams of pro/kg)   Justification: Increased needs post-txp (pending renal function).   Estimated Fluid Needs: ~0869-3138 mL/day (~20 - 25 mL/kg)   Justification: Estimated needs, or per provider pending fluid status     MALNUTRITION  % Intake: </= 50% for >/= 5 days (severe)  % Weight Loss: Weight loss does not meet criteria  Subcutaneous Fat Loss: None observed  Muscle Loss: None observed  Fluid Accumulation/Edema: Mild  Malnutrition Diagnosis: Non-severe malnutrition in the context of acute on chronic illness    Previous Goals   Patient to consume % of nutritionally adequate meal trays TID, or the equivalent with supplements/snacks.  Evaluation: Not met    Previous Nutrition Diagnosis  Predicted inadequate nutrient intake (protein-energy) related to food choices, LOS increasing risk for menu fatigue, potential upcoming surgery, unknown NPO status duration.  Evaluation: No longer applicable, nutrition diagnosis changed below    CURRENT NUTRITION DIAGNOSIS  Inadequate oral intake related to decreased appetite, taste changes, menu fatigue as evidenced by patient consuming </= 50% for >/= 5 days.      INTERVENTIONS  Implementation  Nutrition Education: Post-Tx nutrition education -- Per pt, has not received formal nutrition education on post-Tx  guidelines in the recent past.      Interventions:  Nutrition Education  1. Provided verbal and written nutrition education on post-Tx nutrition guidelines.  Nutrition education included need for increased protein and kcal needs post-op, food safety, and heart-healthy eating in the long-term.    2. Handout given: Post-Tx Nutrition Guidelines, Tips for Increasing Protein, Heart-Healthy Nutrition Therapy   3. Encouraged oral intake as tolerated, recommending smaller, more frequent meals/snacks throughout the day, focusing on nutrient dense foods. Recommended pt's wife to bring in food from home, if able, to help with intake. Encouraged use of oral supplements to help meet kcal and protein needs.   Multivitamin/Mineral supplementation - See above    Goals  Patient to consume % of nutritionally adequate meal trays TID, or the equivalent with supplements/snacks.    Monitoring/Evaluation  Progress toward goals will be monitored and evaluated per protocol.    Mp Smith   Dietetic Intern     I have read and agree with the above nutrition reassessment, eval, and recs.   Sana Costello, MS, RD, LD, [facility]   6C Pgr: [phone]

## 2021-05-12 NOTE — PROGRESS NOTES
Cardiovascular Surgery Progress Note  05/12/2021         Assessment and Plan:     Mr. Jim Willingham is a 65 year old male with history of NICM EF 20% c/b VT s/p CRT-D s/p HMII LVAD 6/19/2017 originally intended as destination therapy 2/2 obesity however with recent weight loss now candidate for transplantation. He was admitted 2/8/21 for worsening functional status and renal function concerning for worsening heart failure. He was eventually listed status 2E for transplant and received a donor offer.   Jim is now s/p orthotopic heart transplant on 5/6/21 with Dr. Ronnie Quigley. He was shocked > 2x received amio, mag and lidocaine while coming off bypass. Known lung injury with isolated air leak to right pleural.      Cardiovascular:   Hx of NICM EF 20%, VT (ICD)  Hx HeartMate II LVAD 6/19/2017  Cardiorenal syndrome   S/P orthotopic heart transplant   No arrhythmia, HD stable.  Most recent echo showed LV EF 65-70% on 5/9/21.  Basiliximab 5/10.  Terbutaline 10 mg TID.    ASA 81 mg, Crestor 10 mg daily.   Diuresis with Bumex, Cards II following.   Chest tubes: Airleak isolated to Right pleural. Keep to suction while in room, can walk off suction for <20 min.   TPW: capped      Pulmonary:  Hx COPD and CECILIA on CPAP  Airleak from Right pleural tube (injury during surgery)   - PTA Breo ellipta, Incruse ellipta, and Singulair   - Extubated POD 1 to 4 lpm via NC. Now saturating well on RA.   - Supplemental O2 PRN to keep sats > 92%. Wean off as tolerated.  - Pulm toilet, IS, activity and deep breathing     Neurology / MSK:  Hx Depression  - PTA bupropion and amitriptyline  Post-op Status  - Neuro intact  - Acute post-operative pain well controlled with acetaminophen, PO oxycodone PRN, IV dilaudid PRN      / Renal:  WALTER on CKD stage III   - Cardiorenal syndrome. Most recent creatinine 1.40, adequate UOP.   - Day before surgery weight 102.3 kg.      GI / FEN:   - Regular diet with supplements, continue bowel regimen  -  "Replace electrolytes as needed, hepatic enzymes WNL.     Endocrine:  DMT2  Gout   Stress induced hyperglycemia initially managed on insulin drip postop, transitioned to NPH BID and sliding scale.   - PTA allopurinol.      Infectious Disease:  Stress induced leukocytosis  - WBC 11.1, remains afebrile, no signs or symptoms of infection  - Completed perioperative antibiotics     Hematology:   Acute blood loss anemia and thrombocytopenia  Hgb 7.3; Plt 148, no signs or symptoms of active bleeding     Anticoagulation:   - ASA only     Prophylaxis:   - Stress ulcer prophylaxis: Pantoprazole 40 mg daily for 30 days  - DVT prophylaxis: Subcutaneous heparin, SCD     Disposition:   - Transferred to  on 5/10   - Therapies recommending discharge to Home vs ARU    Discussed with CVTS Fellow as needed.  Staff surgeons have been informed of changes through both verbal and written communication.      Nash Amado PA-C  Cardiothoracic Surgery  Pager [phone]    7:02 AM   May 12, 2021        Interval History:     No overnight events.   States pain is well managed on current regimen. Slept wellovernight.  Tolerating diet, is passing flatus, + BM 2 days ago. No nausea or vomiting.  Breathing well without complaints.   Working with therapies and ambulating in halls with assistance.   Denies chest pain, palpitations, dizziness, syncopal symptoms, fevers, chills, myalgias, or sternal popping/clicking.         Physical Exam:   Blood pressure 107/59, pulse 100, temperature 97.4  F (36.3  C), temperature source Oral, resp. rate 18, height 1.727 m (5' 8\"), weight 110.2 kg (242 lb 14.4 oz), SpO2 94 %.  Vitals:    05/10/21 0000 05/11/21 0345 05/12/21 0022   Weight: 107.3 kg (236 lb 8.9 oz) 109.6 kg (241 lb 9.6 oz) 110.2 kg (242 lb 14.4 oz)      Weight; + 2-3 kg since surgery and trending up.   24 hr Fluid status; net loss 1.2 L.   MAPs: 73 - 92     Gen: A&Ox4, NAD  Neuro: no focal deficits   CV: sinus tachy, normal S1 S2, no murmurs, " rubs or gallops.   Pulm: CTA, no wheezing or rhonchi, normal breathing on RA  Abd: nondistended, normal BS, soft, nontender  Ext: moderate peripheral edema, 1+ pitting  Incision: clean, dry, intact, no erythema, sternum stable  Tubes/drain sites: dressing clean and dry, serosanguinous output. 24 hr output 500 mL.     * clamped mediastinal tubes and air leak isolated to R pleural tube    * pulled mediastinal tubes without immediate complication.     * Pericardial drain to Bulb suction          Data:    Imaging:  reviewed recent imaging, no acute concerns    Labs:  BMP  Recent Labs   Lab 05/12/21 0622 05/11/21  1752 05/11/21  0542 05/10/21  0330   * 131* 134 134   POTASSIUM 4.7 4.8 4.6 3.8   CHLORIDE 100 97 101 102   QAMAR 7.8* 8.3* 7.6* 8.0*   CO2 27 28 27 26   BUN 54* 66* 72* 90*   CR 1.40* 1.53* 1.65* 1.96*   * 154* 158* 188*     CBC  Recent Labs   Lab 05/12/21  0622 05/11/21  0542 05/10/21  0330 05/09/21  1721   WBC 11.1* 11.6* 10.9 10.6   RBC 2.45* 2.59* 2.71* 2.89*   HGB 7.3* 8.1* 8.5* 8.7*   HCT 23.3* 24.7* 25.2* 26.4*   MCV 95 95 93 91   MCH 29.8 31.3 31.4 30.1   MCHC 31.3* 32.8 33.7 33.0   RDW 15.3* 15.5* 15.6* 15.9*   * 130* 114* 118*     INR  Recent Labs   Lab 05/12/21  0622 05/10/21  0330 05/07/21  0340 05/06/21  0650   INR 1.19* 1.33* 1.38* 1.45*      Hepatic Panel  Recent Labs   Lab 05/12/21 0622 05/11/21  0542 05/10/21  0330 05/09/21  0344   AST PENDING 28 34 41   ALT PENDING 49 48 49   ALKPHOS PENDING 73 78 74   BILITOTAL PENDING 0.6 0.6 0.7   ALBUMIN PENDING 2.8* 2.8* 2.9*     GLUCOSE:   Recent Labs   Lab 05/12/21  0622 05/11/21  2128 05/11/21  1752 05/11/21  1646 05/11/21  1252 05/11/21  0823 05/11/21  0542 05/11/21  0341 05/10/21  2130 05/10/21  0330 05/10/21  0330 05/09/21  1721 05/09/21  1721 05/09/21  0344 05/09/21  0344   *  --  154*  --   --   --  158*  --   --   --  188*  --  250*  --  263*   BGM  --  232*  --  159* 228* 150*  --  163* 183*   < >  --    < >  --    <  >  --     < > = values in this interval not displayed.

## 2021-05-13 ENCOUNTER — APPOINTMENT (OUTPATIENT)
Dept: PHYSICAL THERAPY | Facility: CLINIC | Age: 66
DRG: 001 | End: 2021-05-13
Attending: INTERNAL MEDICINE
Payer: COMMERCIAL

## 2021-05-13 ENCOUNTER — RESULTS ONLY (OUTPATIENT)
Dept: OTHER | Facility: CLINIC | Age: 66
End: 2021-05-13

## 2021-05-13 ENCOUNTER — RESULTS ONLY (OUTPATIENT)
Dept: LAB | Age: 66
End: 2021-05-13

## 2021-05-13 ENCOUNTER — APPOINTMENT (OUTPATIENT)
Dept: GENERAL RADIOLOGY | Facility: CLINIC | Age: 66
DRG: 001 | End: 2021-05-13
Attending: PHYSICIAN ASSISTANT
Payer: COMMERCIAL

## 2021-05-13 LAB
ANION GAP SERPL CALCULATED.3IONS-SCNC: 5 MMOL/L (ref 3–14)
ANION GAP SERPL CALCULATED.3IONS-SCNC: 7 MMOL/L (ref 3–14)
BASOPHILS # BLD AUTO: 0 10E9/L (ref 0–0.2)
BASOPHILS NFR BLD AUTO: 0.1 %
BUN SERPL-MCNC: 42 MG/DL (ref 7–30)
BUN SERPL-MCNC: 45 MG/DL (ref 7–30)
CALCIUM SERPL-MCNC: 7.9 MG/DL (ref 8.5–10.1)
CALCIUM SERPL-MCNC: 7.9 MG/DL (ref 8.5–10.1)
CHLORIDE SERPL-SCNC: 97 MMOL/L (ref 94–109)
CHLORIDE SERPL-SCNC: 98 MMOL/L (ref 94–109)
CO2 SERPL-SCNC: 28 MMOL/L (ref 20–32)
CO2 SERPL-SCNC: 29 MMOL/L (ref 20–32)
CREAT SERPL-MCNC: 1.22 MG/DL (ref 0.66–1.25)
CREAT SERPL-MCNC: 1.31 MG/DL (ref 0.66–1.25)
CRP SERPL-MCNC: 11 MG/L (ref 0–8)
DIFFERENTIAL METHOD BLD: ABNORMAL
EOSINOPHIL # BLD AUTO: 0 10E9/L (ref 0–0.7)
EOSINOPHIL NFR BLD AUTO: 0 %
ERYTHROCYTE [DISTWIDTH] IN BLOOD BY AUTOMATED COUNT: 15.1 % (ref 10–15)
FERRITIN SERPL-MCNC: 98 NG/ML (ref 26–388)
FOLATE SERPL-MCNC: 14.4 NG/ML
GFR SERPL CREATININE-BSD FRML MDRD: 57 ML/MIN/{1.73_M2}
GFR SERPL CREATININE-BSD FRML MDRD: 62 ML/MIN/{1.73_M2}
GLUCOSE BLDC GLUCOMTR-MCNC: 136 MG/DL (ref 70–99)
GLUCOSE BLDC GLUCOMTR-MCNC: 151 MG/DL (ref 70–99)
GLUCOSE BLDC GLUCOMTR-MCNC: 166 MG/DL (ref 70–99)
GLUCOSE SERPL-MCNC: 158 MG/DL (ref 70–99)
GLUCOSE SERPL-MCNC: 159 MG/DL (ref 70–99)
HCT VFR BLD AUTO: 26.5 % (ref 40–53)
HGB BLD-MCNC: 8.3 G/DL (ref 13.3–17.7)
IMM GRANULOCYTES # BLD: 0.2 10E9/L (ref 0–0.4)
IMM GRANULOCYTES NFR BLD: 1.4 %
IRON SATN MFR SERPL: 18 % (ref 15–46)
IRON SERPL-MCNC: 38 UG/DL (ref 35–180)
LYMPHOCYTES # BLD AUTO: 0.4 10E9/L (ref 0.8–5.3)
LYMPHOCYTES NFR BLD AUTO: 2.4 %
MCH RBC QN AUTO: 29.5 PG (ref 26.5–33)
MCHC RBC AUTO-ENTMCNC: 31.3 G/DL (ref 31.5–36.5)
MCV RBC AUTO: 94 FL (ref 78–100)
MONOCYTES # BLD AUTO: 1 10E9/L (ref 0–1.3)
MONOCYTES NFR BLD AUTO: 6.6 %
NEUTROPHILS # BLD AUTO: 13.1 10E9/L (ref 1.6–8.3)
NEUTROPHILS NFR BLD AUTO: 89.5 %
PLATELET # BLD AUTO: 205 10E9/L (ref 150–450)
POTASSIUM SERPL-SCNC: 4.3 MMOL/L (ref 3.4–5.3)
POTASSIUM SERPL-SCNC: 4.7 MMOL/L (ref 3.4–5.3)
RBC # BLD AUTO: 2.81 10E12/L (ref 4.4–5.9)
SODIUM SERPL-SCNC: 131 MMOL/L (ref 133–144)
SODIUM SERPL-SCNC: 133 MMOL/L (ref 133–144)
TACROLIMUS BLD-MCNC: 11.5 UG/L (ref 5–15)
TIBC SERPL-MCNC: 215 UG/DL (ref 240–430)
TME LAST DOSE: NORMAL H
UFH PPP CHRO-ACNC: <0.1 IU/ML
VIT B12 SERPL-MCNC: 734 PG/ML (ref 193–986)
WBC # BLD AUTO: 14.9 10E9/L (ref 4–11)

## 2021-05-13 PROCEDURE — 250N000013 HC RX MED GY IP 250 OP 250 PS 637: Performed by: STUDENT IN AN ORGANIZED HEALTH CARE EDUCATION/TRAINING PROGRAM

## 2021-05-13 PROCEDURE — 250N000012 HC RX MED GY IP 250 OP 636 PS 637: Performed by: STUDENT IN AN ORGANIZED HEALTH CARE EDUCATION/TRAINING PROGRAM

## 2021-05-13 PROCEDURE — 250N000013 HC RX MED GY IP 250 OP 250 PS 637: Performed by: SURGERY

## 2021-05-13 PROCEDURE — 250N000011 HC RX IP 250 OP 636: Performed by: NURSE PRACTITIONER

## 2021-05-13 PROCEDURE — 4A023N6 MEASUREMENT OF CARDIAC SAMPLING AND PRESSURE, RIGHT HEART, PERCUTANEOUS APPROACH: ICD-10-PCS | Performed by: INTERNAL MEDICINE

## 2021-05-13 PROCEDURE — 80048 BASIC METABOLIC PNL TOTAL CA: CPT | Performed by: PHYSICIAN ASSISTANT

## 2021-05-13 PROCEDURE — 36415 COLL VENOUS BLD VENIPUNCTURE: CPT | Performed by: NURSE PRACTITIONER

## 2021-05-13 PROCEDURE — 82728 ASSAY OF FERRITIN: CPT | Performed by: PHYSICIAN ASSISTANT

## 2021-05-13 PROCEDURE — 02BM3ZX EXCISION OF VENTRICULAR SEPTUM, PERCUTANEOUS APPROACH, DIAGNOSTIC: ICD-10-PCS | Performed by: INTERNAL MEDICINE

## 2021-05-13 PROCEDURE — 86140 C-REACTIVE PROTEIN: CPT | Performed by: PHYSICIAN ASSISTANT

## 2021-05-13 PROCEDURE — 85027 COMPLETE CBC AUTOMATED: CPT | Performed by: PHYSICIAN ASSISTANT

## 2021-05-13 PROCEDURE — 999N001017 HC STATISTIC GLUCOSE BY METER IP

## 2021-05-13 PROCEDURE — 250N000013 HC RX MED GY IP 250 OP 250 PS 637: Performed by: NURSE PRACTITIONER

## 2021-05-13 PROCEDURE — 71046 X-RAY EXAM CHEST 2 VIEWS: CPT | Mod: 26 | Performed by: RADIOLOGY

## 2021-05-13 PROCEDURE — 82607 VITAMIN B-12: CPT | Performed by: PHYSICIAN ASSISTANT

## 2021-05-13 PROCEDURE — 83550 IRON BINDING TEST: CPT | Performed by: PHYSICIAN ASSISTANT

## 2021-05-13 PROCEDURE — 36592 COLLECT BLOOD FROM PICC: CPT | Performed by: NURSE PRACTITIONER

## 2021-05-13 PROCEDURE — 86832 HLA CLASS I HIGH DEFIN QUAL: CPT | Performed by: INTERNAL MEDICINE

## 2021-05-13 PROCEDURE — 84425 ASSAY OF VITAMIN B-1: CPT | Performed by: PHYSICIAN ASSISTANT

## 2021-05-13 PROCEDURE — 85520 HEPARIN ASSAY: CPT | Performed by: PHYSICIAN ASSISTANT

## 2021-05-13 PROCEDURE — 250N000009 HC RX 250: Performed by: INTERNAL MEDICINE

## 2021-05-13 PROCEDURE — 250N000012 HC RX MED GY IP 250 OP 636 PS 637: Performed by: NURSE PRACTITIONER

## 2021-05-13 PROCEDURE — 250N000012 HC RX MED GY IP 250 OP 636 PS 637: Performed by: SURGERY

## 2021-05-13 PROCEDURE — 85004 AUTOMATED DIFF WBC COUNT: CPT | Performed by: PHYSICIAN ASSISTANT

## 2021-05-13 PROCEDURE — 80048 BASIC METABOLIC PNL TOTAL CA: CPT | Performed by: NURSE PRACTITIONER

## 2021-05-13 PROCEDURE — 83540 ASSAY OF IRON: CPT | Performed by: PHYSICIAN ASSISTANT

## 2021-05-13 PROCEDURE — 97530 THERAPEUTIC ACTIVITIES: CPT | Mod: GP

## 2021-05-13 PROCEDURE — 82746 ASSAY OF FOLIC ACID SERUM: CPT | Performed by: PHYSICIAN ASSISTANT

## 2021-05-13 PROCEDURE — 250N000011 HC RX IP 250 OP 636: Performed by: PHYSICIAN ASSISTANT

## 2021-05-13 PROCEDURE — 99232 SBSQ HOSP IP/OBS MODERATE 35: CPT | Mod: 25 | Performed by: NURSE PRACTITIONER

## 2021-05-13 PROCEDURE — 214N000001 HC R&B CCU UMMC

## 2021-05-13 PROCEDURE — 36415 COLL VENOUS BLD VENIPUNCTURE: CPT | Performed by: PHYSICIAN ASSISTANT

## 2021-05-13 PROCEDURE — 250N000011 HC RX IP 250 OP 636: Performed by: STUDENT IN AN ORGANIZED HEALTH CARE EDUCATION/TRAINING PROGRAM

## 2021-05-13 PROCEDURE — 272N000001 HC OR GENERAL SUPPLY STERILE: Performed by: INTERNAL MEDICINE

## 2021-05-13 PROCEDURE — 93505 ENDOMYOCARDIAL BIOPSY: CPT | Performed by: INTERNAL MEDICINE

## 2021-05-13 PROCEDURE — 80197 ASSAY OF TACROLIMUS: CPT | Performed by: NURSE PRACTITIONER

## 2021-05-13 PROCEDURE — 86833 HLA CLASS II HIGH DEFIN QUAL: CPT | Performed by: INTERNAL MEDICINE

## 2021-05-13 PROCEDURE — 97116 GAIT TRAINING THERAPY: CPT | Mod: GP

## 2021-05-13 PROCEDURE — 71046 X-RAY EXAM CHEST 2 VIEWS: CPT

## 2021-05-13 PROCEDURE — 250N000013 HC RX MED GY IP 250 OP 250 PS 637: Performed by: PHYSICIAN ASSISTANT

## 2021-05-13 RX ORDER — LACTULOSE 10 G/10G
20 SOLUTION ORAL ONCE
Status: COMPLETED | OUTPATIENT
Start: 2021-05-13 | End: 2021-05-13

## 2021-05-13 RX ORDER — ALBUTEROL SULFATE 90 UG/1
2 AEROSOL, METERED RESPIRATORY (INHALATION) EVERY 4 HOURS PRN
Status: DISCONTINUED | OUTPATIENT
Start: 2021-05-13 | End: 2021-05-31 | Stop reason: HOSPADM

## 2021-05-13 RX ORDER — CYANOCOBALAMIN 1000 UG/ML
1000 INJECTION, SOLUTION INTRAMUSCULAR; SUBCUTANEOUS
Status: DISCONTINUED | OUTPATIENT
Start: 2021-05-13 | End: 2021-05-31 | Stop reason: HOSPADM

## 2021-05-13 RX ORDER — TERBUTALINE SULFATE 2.5 MG/1
2.5 TABLET ORAL EVERY 8 HOURS SCHEDULED
Status: DISCONTINUED | OUTPATIENT
Start: 2021-05-13 | End: 2021-05-14

## 2021-05-13 RX ADMIN — PREDNISONE 35 MG: 20 TABLET ORAL at 09:52

## 2021-05-13 RX ADMIN — HYDROMORPHONE HYDROCHLORIDE 0.4 MG: 1 INJECTION, SOLUTION INTRAMUSCULAR; INTRAVENOUS; SUBCUTANEOUS at 08:16

## 2021-05-13 RX ADMIN — ACETAMINOPHEN 975 MG: 325 TABLET, FILM COATED ORAL at 06:03

## 2021-05-13 RX ADMIN — PROCHLORPERAZINE EDISYLATE 10 MG: 5 INJECTION INTRAMUSCULAR; INTRAVENOUS at 20:26

## 2021-05-13 RX ADMIN — MONTELUKAST 10 MG: 10 TABLET, FILM COATED ORAL at 21:16

## 2021-05-13 RX ADMIN — HYDROMORPHONE HYDROCHLORIDE 0.4 MG: 1 INJECTION, SOLUTION INTRAMUSCULAR; INTRAVENOUS; SUBCUTANEOUS at 10:46

## 2021-05-13 RX ADMIN — GABAPENTIN 600 MG: 300 CAPSULE ORAL at 21:17

## 2021-05-13 RX ADMIN — GABAPENTIN 600 MG: 300 CAPSULE ORAL at 13:28

## 2021-05-13 RX ADMIN — BUPROPION HYDROCHLORIDE 100 MG: 100 TABLET, EXTENDED RELEASE ORAL at 09:50

## 2021-05-13 RX ADMIN — ACETAMINOPHEN 975 MG: 325 TABLET, FILM COATED ORAL at 16:15

## 2021-05-13 RX ADMIN — PREDNISONE 35 MG: 20 TABLET ORAL at 21:15

## 2021-05-13 RX ADMIN — TERBUTALINE SULFATE 5 MG: 5 TABLET ORAL at 04:05

## 2021-05-13 RX ADMIN — DOCUSATE SODIUM 100 MG: 100 CAPSULE, LIQUID FILLED ORAL at 21:17

## 2021-05-13 RX ADMIN — ASPIRIN 81 MG CHEWABLE TABLET 81 MG: 81 TABLET CHEWABLE at 09:51

## 2021-05-13 RX ADMIN — TERBUTALINE SULFATE 2.5 MG: 2.5 TABLET ORAL at 21:56

## 2021-05-13 RX ADMIN — INSULIN ASPART: 100 INJECTION, SOLUTION INTRAVENOUS; SUBCUTANEOUS at 10:12

## 2021-05-13 RX ADMIN — PANTOPRAZOLE SODIUM 40 MG: 40 TABLET, DELAYED RELEASE ORAL at 09:55

## 2021-05-13 RX ADMIN — GABAPENTIN 300 MG: 300 CAPSULE ORAL at 09:55

## 2021-05-13 RX ADMIN — ONDANSETRON 4 MG: 2 INJECTION INTRAMUSCULAR; INTRAVENOUS at 07:52

## 2021-05-13 RX ADMIN — HEPARIN SODIUM 5000 UNITS: 5000 INJECTION, SOLUTION INTRAVENOUS; SUBCUTANEOUS at 04:05

## 2021-05-13 RX ADMIN — Medication 1 TABLET: at 21:16

## 2021-05-13 RX ADMIN — TACROLIMUS 1 MG: 1 CAPSULE ORAL at 21:15

## 2021-05-13 RX ADMIN — TERBUTALINE SULFATE 2.5 MG: 2.5 TABLET ORAL at 13:28

## 2021-05-13 RX ADMIN — NYSTATIN 1000000 UNITS: 500000 SUSPENSION ORAL at 16:15

## 2021-05-13 RX ADMIN — VALGANCICLOVIR 900 MG: 450 TABLET, FILM COATED ORAL at 09:49

## 2021-05-13 RX ADMIN — SODIUM CHLORIDE, PRESERVATIVE FREE 5 ML: 5 INJECTION INTRAVENOUS at 16:43

## 2021-05-13 RX ADMIN — BUPROPION HYDROCHLORIDE 100 MG: 100 TABLET, EXTENDED RELEASE ORAL at 21:14

## 2021-05-13 RX ADMIN — POLYETHYLENE GLYCOL 3350 17 G: 17 POWDER, FOR SOLUTION ORAL at 09:49

## 2021-05-13 RX ADMIN — FLUTICASONE FUROATE AND VILANTEROL TRIFENATATE 1 PUFF: 100; 25 POWDER RESPIRATORY (INHALATION) at 10:12

## 2021-05-13 RX ADMIN — BUMETANIDE 3 MG: 0.25 INJECTION INTRAMUSCULAR; INTRAVENOUS at 21:16

## 2021-05-13 RX ADMIN — Medication 1 TABLET: at 09:55

## 2021-05-13 RX ADMIN — DAPSONE 50 MG: 25 TABLET ORAL at 09:51

## 2021-05-13 RX ADMIN — MYCOPHENOLATE MOFETIL 1500 MG: 250 CAPSULE ORAL at 21:14

## 2021-05-13 RX ADMIN — ALLOPURINOL 300 MG: 300 TABLET ORAL at 09:55

## 2021-05-13 RX ADMIN — BISACODYL 10 MG: 10 SUPPOSITORY RECTAL at 23:52

## 2021-05-13 RX ADMIN — ACETAMINOPHEN 975 MG: 325 TABLET, FILM COATED ORAL at 21:15

## 2021-05-13 RX ADMIN — ROSUVASTATIN CALCIUM 10 MG: 10 TABLET, FILM COATED ORAL at 09:50

## 2021-05-13 RX ADMIN — CYANOCOBALAMIN 1000 MCG: 1000 INJECTION, SOLUTION INTRAMUSCULAR at 10:49

## 2021-05-13 RX ADMIN — DOCUSATE SODIUM 50 MG AND SENNOSIDES 8.6 MG 2 TABLET: 8.6; 5 TABLET, FILM COATED ORAL at 09:48

## 2021-05-13 RX ADMIN — NYSTATIN 1000000 UNITS: 500000 SUSPENSION ORAL at 09:47

## 2021-05-13 RX ADMIN — SENNOSIDES AND DOCUSATE SODIUM 1 TABLET: 8.6; 5 TABLET ORAL at 21:14

## 2021-05-13 RX ADMIN — NYSTATIN 1000000 UNITS: 500000 SUSPENSION ORAL at 13:27

## 2021-05-13 RX ADMIN — UMECLIDINIUM 1 PUFF: 62.5 AEROSOL, POWDER ORAL at 10:10

## 2021-05-13 RX ADMIN — HEPARIN SODIUM 5000 UNITS: 5000 INJECTION, SOLUTION INTRAVENOUS; SUBCUTANEOUS at 13:27

## 2021-05-13 RX ADMIN — MYCOPHENOLATE MOFETIL 1500 MG: 250 CAPSULE ORAL at 09:50

## 2021-05-13 RX ADMIN — BUMETANIDE 3 MG: 0.25 INJECTION INTRAMUSCULAR; INTRAVENOUS at 09:50

## 2021-05-13 RX ADMIN — HEPARIN SODIUM 5000 UNITS: 5000 INJECTION, SOLUTION INTRAVENOUS; SUBCUTANEOUS at 21:15

## 2021-05-13 RX ADMIN — DOCUSATE SODIUM 100 MG: 100 CAPSULE, LIQUID FILLED ORAL at 09:47

## 2021-05-13 RX ADMIN — ONDANSETRON 4 MG: 2 INJECTION INTRAMUSCULAR; INTRAVENOUS at 20:09

## 2021-05-13 RX ADMIN — Medication 37.5 MG: at 21:56

## 2021-05-13 RX ADMIN — TACROLIMUS 1 MG: 1 CAPSULE ORAL at 09:51

## 2021-05-13 RX ADMIN — LACTULOSE 20 G: 10 POWDER, FOR SOLUTION ORAL at 21:59

## 2021-05-13 RX ADMIN — Medication 10 ML: at 13:28

## 2021-05-13 ASSESSMENT — ACTIVITIES OF DAILY LIVING (ADL)
ADLS_ACUITY_SCORE: 14

## 2021-05-13 ASSESSMENT — MIFFLIN-ST. JEOR: SCORE: 1851.33

## 2021-05-13 NOTE — PROGRESS NOTES
[facility]   Cardiology II Service / Advanced Heart Failure  Daily Progress Note  Date of Service: 5/13/2021      Patient: Jim Willingham  MRN: [id number]  Admission Date: 2/8/2021  Hospital Day # 94    Assessment and Plan: Jim Willingham is a 65 year old male with history of NICM EF 20% c/b VT s/p CRT-D s/p HMII LVAD 6/19/2017 originally intended as destination therapy 2/2 obesity however with recent weight loss now candidate for transplantation. He is admitted for decompensated heart failure with subsequent s/p OHT 5/6/21.     Recommendations today:   - Decrease Terbutaline to 2.5 mg po TID.   - RHC/biopsy, ECHO and DSA today.   - Daily tac level.      S/p OHT. 5/6/21 secondary to NICM. Echo 5/9/21 conistent with EF 65-70%, mildly decreased RV function, and dilated IVC. Lomira numbers prior to removal with CVP-16, PCWP-19, MICHELLE CO-5.8, and MICHELLE CI-2.8, and SVR-914.   Immunosuppression: Simulect induction 5/6 and 5/10. Tac 1 mg po BID, level 11.5. Goal 10-12. Prednisone taper. Cellcept 1500 mg po BID.   Serostatus: CMV: D?, R+, EBV: R+, Toxo R-  Prophylaxis: Dapsone, Nystatin, and Valcyte (3-6 months pending donor status).   - Continue Bumex 3 mg IV BID.   - Terbutaline decreased to 2.5 mg po TID.   - Continue Crestor and ASA.   - RHC/Biopsy, echo, and DSA pending.      Leukocytosis. Chest X-ray 5/12/21 consistent with small right apical pneumo, small left pleural effusion, and bilateral opacities.   - WBC-14.9 (11.1).   - Repeat Chest X-ray pending this AM.   ================================================================    Interval History/ROS: He notes unchanged cough this AM. He denies fever, chills, palpitations, RESENDIZ, nausea, vomiting, and diarrhea. He notes mild LE edema. He is tolerating oral intake and progressing with therapy.     Last 24 hr care team notes reviewed.   ROS:  4 point ROS including Respiratory, CV, GI and , other than that noted in the HPI, is negative.     Medications:  "Reviewed in EPIC.     Physical Exam:   BP 94/64 (BP Location: Left arm)   Pulse 98   Temp 97.7  F (36.5  C) (Oral)   Resp 16   Ht 1.727 m (5' 8\")   Wt 108.2 kg (238 lb 8 oz)   SpO2 99%   BMI 36.26 kg/m    GENERAL: Appears alert and oriented times three.   HEENT: Eye symmetrical and free of discharge bilaterally. Mucous membranes moist and without lesions.  NECK: Supple and without lymphadenopathy. JVD mid neck.   CV: RRR, S1S2 present without murmur, rub, or gallop.   RESPIRATORY: Respirations regular, even, and unlabored. Lungs CTA throughout.   GI: Soft and non distended with normoactive bowel sounds present in all quadrants. No tenderness, rebound, guarding. No organomegaly.   EXTREMITIES: +1 bilateral LE peripheral edema. 2+ bilateral pedal pulses.   NEUROLOGIC: Alert and orientated x 3. CN II-XII grossly intact. No focal deficits.   MUSCULOSKELETAL: No joint swelling or tenderness.   SKIN: No jaundice. No rashes or lesions.     Data:  CMP  Recent Labs   Lab 05/13/21  0534 05/12/21  1736 05/12/21  0622 05/11/21  1752 05/11/21  0542 05/10/21  0330 05/09/21  0344 05/09/21  0344 05/07/21  0340 05/07/21  0340 05/06/21  1535 05/06/21  1535    132* 132* 131* 134 134   < > 136   < > 136   < > 136   POTASSIUM 4.3 4.6 4.7 4.8 4.6 3.8   < > 3.8   < > 4.8   < > 5.2   CHLORIDE 97 100 100 97 101 102   < > 103   < > 104   < > 103   CO2 29 28 27 28 27 26   < > 22   < > 23   < > 25   ANIONGAP 7 4 4 6 6 7   < > 12   < > 9   < > 9   * 185* 180* 154* 158* 188*   < > 263*   < > 153*   < > 201*   BUN 45* 52* 54* 66* 72* 90*   < > 93*   < > 68*   < > 58*   CR 1.31* 1.48* 1.40* 1.53* 1.65* 1.96*   < > 2.29*   < > 2.56*   < > 2.22*   GFRESTIMATED 57* 49* 53* 47* 43* 35*   < > 29*   < > 25*   < > 30*   GFRESTBLACK 66 57* 61 55* 50* 41*   < > 34*   < > 29*   < > 35*   QAMAR 7.9* 7.6* 7.8* 8.3* 7.6* 8.0*   < > 7.7*   < > 8.5   < > 8.5   MAG  --   --  2.3  --   --  2.7*  --   --   --  3.0*  --  3.0*   PHOS  --   --   --   " --   --   --   --   --   --   --   --  5.7*   PROTTOTAL  --   --  4.9*  --  5.0* 5.0*  --  5.1*   < > 5.2*  --   --    ALBUMIN  --   --  2.7*  --  2.8* 2.8*  --  2.9*   < > 3.1*  --   --    BILITOTAL  --   --  0.5  --  0.6 0.6  --  0.7   < > 0.6  --   --    ALKPHOS  --   --  71  --  73 78  --  74   < > 46  --   --    AST  --   --  33  --  28 34  --  41   < > 97*  --   --    ALT  --   --  52  --  49 48  --  49   < > 31  --   --     < > = values in this interval not displayed.     CBC  Recent Labs   Lab 05/13/21  0534 05/12/21  0622 05/11/21  0542 05/10/21  0330   WBC 14.9* 11.1* 11.6* 10.9   RBC 2.81* 2.45* 2.59* 2.71*   HGB 8.3* 7.3* 8.1* 8.5*   HCT 26.5* 23.3* 24.7* 25.2*   MCV 94 95 95 93   MCH 29.5 29.8 31.3 31.4   MCHC 31.3* 31.3* 32.8 33.7   RDW 15.1* 15.3* 15.5* 15.6*    148* 130* 114*     INR  Recent Labs   Lab 05/12/21  0622 05/10/21  0330 05/07/21  0340   INR 1.19* 1.33* 1.38*       Patient discussed with Dr. Sears.      Soraya [facility]  5/13/2021

## 2021-05-13 NOTE — PLAN OF CARE
D: post heart transplant    I: Monitored vitals and assessed pt status.   Changed:  Running:  PRN: dilaudid, oxy, tylenol     A: A0x4. VSS. Afebrile. Urinating adequately. Pt had two chest tubes removed and a bulb drain attach to another. Pt stated that his pain was at a 5/10 while he is normally at a 6-7/10. Treated with IV dilaudid and tylenol to bridge. Pt will be NPO and midnight for RHC tomorrow. Pt was unable to feel when he was incontinent so more frequent toilet was initiated.     P: Continue to monitor Pt status and report changes to treatment team. Keep pt NPO at midnight.

## 2021-05-13 NOTE — PLAN OF CARE
D: S/p heart transplant on 5/6. Admitted 2/8 for worsening functional status and renal function concerning for worsening HF. Hx of NICM EF 20% c/b VT s/p CRT-D s/p HM2 SEAN in 2017, CECILIA, DM2, CKD, and COPD     I: Monitored vitals and assessed pt status.   Changed: NPO since MN  PRN: IV Dilaudid, Tramadol, Oxycodone  Tele: SR/ST 90's-100's  O2: RA, home CPAP at HS  Mobility: Ax1-2 w/ walker/GB     A: A0x4. BUE tremors. VSS. TPW capped. CTx3. Pleurals to suction and pericardial to bulb suction. + air leak in R pleural CT r/t injury during surgery. Afebrile. Urinating adequately. LBM 5/9. Wound vac over sternal incision. Old DL packing dressing CDI. C/o incisional pain. Able to make needs known.     P: Continue to monitor Pt status and report changes to CVTS. RHC, biopsy, Echo and DSA today.

## 2021-05-13 NOTE — PLAN OF CARE
D: Post heart transplant    I: Monitored vitals and assessed pt status.   Changed:  Running:  PRN: tylenol, oxy, dilaudid, Zofran    A: A0x4. VSS. Afebrile. Urinating adequately. Pt was nauseous and in pain in the am so IV dilaudid 0.4mg and zofran were given. After coming back from a PT appointment, pt stated that he had severe pain in his chest and it hurt to breath upon inspiration. PA was notified and believed it was from a buildup of air around the lungs. PA stated that pt should be on portable suction when out of the room. Pt was NPO all day for a RHC so novolog insulin was not given until the pm post RHC. Pt went to the cath lab around 1800. When pt came back and started eating, he became nauseated and had an emesis x 2. Pt also complained of gastric pain that went away after throwing up. zofran and compazine were given. Due to previous NPO status and emesis, novolog insulin was not given. After Zofran and compazine started to take effect, meds were given. Drive line and chest tube dressing were changed and no significant changes were noted. Bulb drain appeared to be filling up will air at a faster rate than it was yesterday.     P: encourage frequent toileting to prevent urinary incontinence.

## 2021-05-13 NOTE — PROGRESS NOTES
Cardiovascular Surgery Progress Note  05/13/2021         Assessment and Plan:     Mr. Jim Willingham is a 65 year old male with history of NICM EF 20% c/b VT s/p CRT-D s/p HMII LVAD 6/19/2017 originally intended as destination therapy 2/2 obesity however with recent weight loss now candidate for transplantation. He was admitted 2/8/21 for worsening functional status and renal function concerning for worsening heart failure. He was eventually listed status 2E for transplant and received a donor offer.   Jim is now s/p orthotopic heart transplant on 5/6/21 with Dr. Ronnie Quigley. He was shocked > 2x received amio, mag and lidocaine while coming off bypass. Known lung injury with isolated air leak to right pleural.      Cardiovascular:   Hx of NICM EF 20%, VT (ICD)  Hx HeartMate II LVAD 6/19/2017  Cardiorenal syndrome   S/P orthotopic heart transplant   No arrhythmia, HD stable.  Most recent echo showed LV EF 65-70% on 5/9/21.  Basiliximab 5/10.  Terbutaline 5 mg TID.    ASA 81 mg, Crestor 10 mg daily.   Diuresis with IV Bumex, Cards II following.   Chest tubes: Airleak isolated to Right pleural. Keep to suction while in room, can walk off suction for <20 min. Had some CP after walking off suction 5/13, needs portable suction while ambulating.   TPW: capped      Pulmonary:  Hx COPD and CECILIA on CPAP  Airleak from Right pleural tube (injury during surgery)   - PTA Breo ellipta, Incruse ellipta, and Singulair   - Extubated POD 1 to 4 lpm via NC. Now saturating well on RA.   - Supplemental O2 PRN to keep sats > 92%. Wean off as tolerated.  - Pulm toilet, IS, activity and deep breathing     Neurology / MSK:  Hx Depression  - PTA bupropion and amitriptyline  Post-op Status  - Neuro intact  - Acute post-operative pain well controlled with acetaminophen, PO oxycodone PRN, IV dilaudid PRN      / Renal:  WALTER on CKD stage III   - Cardiorenal syndrome improving. Most recent creatinine 1.31, adequate UOP.   - Day before surgery  "weight 102.3 kg.      GI / FEN:   Hx Sylvester-en-Y gastric bypass  Non-severe Mild Protein-Calorie Malnutrition   - Regular diet with supplements, continue bowel regimen  - Replace electrolytes as needed, hepatic enzymes WNL.     Endocrine:  DMT2  Gout   Stress induced hyperglycemia initially managed on insulin drip postop, transitioned to NPH BID and sliding scale. Converting to Lantus 5/14 am, continue prandial and sliding corrective scale.   - PTA allopurinol.      Infectious Disease:  Stress induced leukocytosis  - WBC 14.9, remains afebrile, no signs or symptoms of infection  - Completed perioperative antibiotics     Hematology:   Acute blood loss anemia and thrombocytopenia  Hgb 8.3; Plt 205, no signs or symptoms of active bleeding     Anticoagulation:   - ASA only     Prophylaxis:   - Stress ulcer prophylaxis: Pantoprazole 40 mg daily for 30 days  - DVT prophylaxis: Subcutaneous heparin, SCD     Disposition:   - Transferred to  on 5/10   - Therapies recommending discharge to Home vs ARU    Discussed with CVTS Fellow as needed.  Staff surgeons have been informed of changes through both verbal and written communication.      Nash Amado PA-C  Cardiothoracic Surgery  Pager [phone]    7:09 AM   May 13, 2021        Interval History:     No overnight events. NPO from midnight for RHC and Bx today.   States pain is well managed on current regimen. Slept ok overnight.  Tolerating diet, + flatus, + BM last night after suppository. Mild nausea, no vomiting.  Breathing well without complaints but pain after on waterseal for therapy today.   Working with therapies and ambulating in halls without assistance.   Denies chest pain, palpitations, dizziness, syncopal symptoms, fevers, chills, myalgias, or sternal popping/clicking.         Physical Exam:   Blood pressure 110/69, pulse 94, temperature 98.1  F (36.7  C), temperature source Oral, resp. rate 18, height 1.727 m (5' 8\"), weight 108.2 kg (238 lb 8 oz), SpO2 99 " %.  Vitals:    05/11/21 0345 05/12/21 0022 05/13/21 0602   Weight: 109.6 kg (241 lb 9.6 oz) 110.2 kg (242 lb 14.4 oz) 108.2 kg (238 lb 8 oz)      Weight; + 6 kg since surgery and trending down.   24 hr Fluid status; net loss 2.3 L.   MAPs: 75 - 82     Gen: A&Ox4, NAD  Neuro: no focal deficits   CV: RRR, normal S1 S2, no murmurs, rubs or gallops.   Pulm: CTA, no wheezing or rhonchi, normal breathing on RA  Abd: nondistended, normal BS, soft, nontender  Ext: moderate peripheral edema, 1+ pitting  Incision: clean, dry, intact, no erythema, sternum stable. Removed prevena and skin glue applied.   Tubes/drain sites: dressing clean and dry, serosanguinous output, RIGHT pleural air leak. 24 hr output 500 mL.          Data:    Imaging:  reviewed recent imaging, no acute concerns    Labs:  BMP  Recent Labs   Lab 05/13/21  0534 05/12/21  1736 05/12/21  0622 05/11/21  1752    132* 132* 131*   POTASSIUM 4.3 4.6 4.7 4.8   CHLORIDE 97 100 100 97   QAMAR 7.9* 7.6* 7.8* 8.3*   CO2 29 28 27 28   BUN 45* 52* 54* 66*   CR 1.31* 1.48* 1.40* 1.53*   * 185* 180* 154*     CBC  Recent Labs   Lab 05/13/21  0534 05/12/21  0622 05/11/21  0542 05/10/21  0330   WBC 14.9* 11.1* 11.6* 10.9   RBC 2.81* 2.45* 2.59* 2.71*   HGB 8.3* 7.3* 8.1* 8.5*   HCT 26.5* 23.3* 24.7* 25.2*   MCV 94 95 95 93   MCH 29.5 29.8 31.3 31.4   MCHC 31.3* 31.3* 32.8 33.7   RDW 15.1* 15.3* 15.5* 15.6*    148* 130* 114*     INR  Recent Labs   Lab 05/12/21  0622 05/10/21  0330 05/07/21  0340   INR 1.19* 1.33* 1.38*      Hepatic Panel  Recent Labs   Lab 05/12/21  0622 05/11/21  0542 05/10/21  0330 05/09/21  0344   AST 33 28 34 41   ALT 52 49 48 49   ALKPHOS 71 73 78 74   BILITOTAL 0.5 0.6 0.6 0.7   ALBUMIN 2.7* 2.8* 2.8* 2.9*     GLUCOSE:   Recent Labs   Lab 05/13/21  0534 05/12/21  2156 05/12/21  1853 05/12/21  1736 05/12/21  1436 05/12/21  1104 05/12/21  0837 05/12/21  0622 05/11/21  2128 05/11/21  1752 05/11/21  0542 05/11/21  0542 05/10/21  0330  05/10/21  0330   *  --   --  185*  --   --   --  180*  --  154*  --  158*  --  188*   BGM  --  198* 172*  --  202* 195* 172*  --  232*  --    < >  --    < >  --     < > = values in this interval not displayed.

## 2021-05-14 ENCOUNTER — APPOINTMENT (OUTPATIENT)
Dept: CARDIOLOGY | Facility: CLINIC | Age: 66
DRG: 001 | End: 2021-05-14
Attending: NURSE PRACTITIONER
Payer: COMMERCIAL

## 2021-05-14 ENCOUNTER — APPOINTMENT (OUTPATIENT)
Dept: PHYSICAL THERAPY | Facility: CLINIC | Age: 66
DRG: 001 | End: 2021-05-14
Attending: INTERNAL MEDICINE
Payer: COMMERCIAL

## 2021-05-14 ENCOUNTER — APPOINTMENT (OUTPATIENT)
Dept: OCCUPATIONAL THERAPY | Facility: CLINIC | Age: 66
DRG: 001 | End: 2021-05-14
Attending: INTERNAL MEDICINE
Payer: COMMERCIAL

## 2021-05-14 ENCOUNTER — APPOINTMENT (OUTPATIENT)
Dept: GENERAL RADIOLOGY | Facility: CLINIC | Age: 66
DRG: 001 | End: 2021-05-14
Attending: PHYSICIAN ASSISTANT
Payer: COMMERCIAL

## 2021-05-14 LAB
ALBUMIN SERPL-MCNC: 2.5 G/DL (ref 3.4–5)
ALP SERPL-CCNC: 70 U/L (ref 40–150)
ALT SERPL W P-5'-P-CCNC: 47 U/L (ref 0–70)
ANION GAP SERPL CALCULATED.3IONS-SCNC: 5 MMOL/L (ref 3–14)
ANION GAP SERPL CALCULATED.3IONS-SCNC: 5 MMOL/L (ref 3–14)
AST SERPL W P-5'-P-CCNC: 32 U/L (ref 0–45)
BILIRUB DIRECT SERPL-MCNC: 0.2 MG/DL (ref 0–0.2)
BILIRUB SERPL-MCNC: 0.6 MG/DL (ref 0.2–1.3)
BUN SERPL-MCNC: 40 MG/DL (ref 7–30)
BUN SERPL-MCNC: 43 MG/DL (ref 7–30)
CALCIUM SERPL-MCNC: 7.8 MG/DL (ref 8.5–10.1)
CALCIUM SERPL-MCNC: 8.1 MG/DL (ref 8.5–10.1)
CHLORIDE SERPL-SCNC: 97 MMOL/L (ref 94–109)
CHLORIDE SERPL-SCNC: 98 MMOL/L (ref 94–109)
CO2 SERPL-SCNC: 29 MMOL/L (ref 20–32)
CO2 SERPL-SCNC: 29 MMOL/L (ref 20–32)
COPATH REPORT: NORMAL
CREAT SERPL-MCNC: 1.33 MG/DL (ref 0.66–1.25)
CREAT SERPL-MCNC: 1.51 MG/DL (ref 0.66–1.25)
CRP SERPL-MCNC: 11 MG/L (ref 0–8)
ERYTHROCYTE [DISTWIDTH] IN BLOOD BY AUTOMATED COUNT: 15.2 % (ref 10–15)
GFR SERPL CREATININE-BSD FRML MDRD: 48 ML/MIN/{1.73_M2}
GFR SERPL CREATININE-BSD FRML MDRD: 56 ML/MIN/{1.73_M2}
GLUCOSE BLDC GLUCOMTR-MCNC: 117 MG/DL (ref 70–99)
GLUCOSE BLDC GLUCOMTR-MCNC: 166 MG/DL (ref 70–99)
GLUCOSE BLDC GLUCOMTR-MCNC: 170 MG/DL (ref 70–99)
GLUCOSE BLDC GLUCOMTR-MCNC: 187 MG/DL (ref 70–99)
GLUCOSE BLDC GLUCOMTR-MCNC: 97 MG/DL (ref 70–99)
GLUCOSE SERPL-MCNC: 177 MG/DL (ref 70–99)
GLUCOSE SERPL-MCNC: 212 MG/DL (ref 70–99)
HCT VFR BLD AUTO: 24.3 % (ref 40–53)
HGB BLD-MCNC: 7.6 G/DL (ref 13.3–17.7)
HGB BLD-MCNC: 8.5 G/DL (ref 13.3–17.7)
LACTATE BLD-SCNC: 1.9 MMOL/L (ref 0.7–2)
MAGNESIUM SERPL-MCNC: 1.9 MG/DL (ref 1.6–2.3)
MCH RBC QN AUTO: 29.8 PG (ref 26.5–33)
MCHC RBC AUTO-ENTMCNC: 31.3 G/DL (ref 31.5–36.5)
MCV RBC AUTO: 95 FL (ref 78–100)
PLATELET # BLD AUTO: 231 10E9/L (ref 150–450)
POTASSIUM SERPL-SCNC: 4.4 MMOL/L (ref 3.4–5.3)
POTASSIUM SERPL-SCNC: 4.5 MMOL/L (ref 3.4–5.3)
PROT SERPL-MCNC: 4.7 G/DL (ref 6.8–8.8)
RBC # BLD AUTO: 2.55 10E12/L (ref 4.4–5.9)
SODIUM SERPL-SCNC: 130 MMOL/L (ref 133–144)
SODIUM SERPL-SCNC: 132 MMOL/L (ref 133–144)
TACROLIMUS BLD-MCNC: <3 UG/L (ref 5–15)
TME LAST DOSE: ABNORMAL H
WBC # BLD AUTO: 13.4 10E9/L (ref 4–11)

## 2021-05-14 PROCEDURE — 250N000013 HC RX MED GY IP 250 OP 250 PS 637: Performed by: NURSE PRACTITIONER

## 2021-05-14 PROCEDURE — 85018 HEMOGLOBIN: CPT | Performed by: PHYSICIAN ASSISTANT

## 2021-05-14 PROCEDURE — 83605 ASSAY OF LACTIC ACID: CPT | Performed by: SURGERY

## 2021-05-14 PROCEDURE — 255N000002 HC RX 255 OP 636: Performed by: INTERNAL MEDICINE

## 2021-05-14 PROCEDURE — 250N000012 HC RX MED GY IP 250 OP 636 PS 637: Performed by: PHYSICIAN ASSISTANT

## 2021-05-14 PROCEDURE — 80197 ASSAY OF TACROLIMUS: CPT | Performed by: NURSE PRACTITIONER

## 2021-05-14 PROCEDURE — 250N000012 HC RX MED GY IP 250 OP 636 PS 637: Performed by: SURGERY

## 2021-05-14 PROCEDURE — 999N000208 ECHOCARDIOGRAM COMPLETE

## 2021-05-14 PROCEDURE — 250N000011 HC RX IP 250 OP 636: Performed by: STUDENT IN AN ORGANIZED HEALTH CARE EDUCATION/TRAINING PROGRAM

## 2021-05-14 PROCEDURE — 80048 BASIC METABOLIC PNL TOTAL CA: CPT | Performed by: NURSE PRACTITIONER

## 2021-05-14 PROCEDURE — 36592 COLLECT BLOOD FROM PICC: CPT | Performed by: NURSE PRACTITIONER

## 2021-05-14 PROCEDURE — 250N000013 HC RX MED GY IP 250 OP 250 PS 637: Performed by: SURGERY

## 2021-05-14 PROCEDURE — 250N000013 HC RX MED GY IP 250 OP 250 PS 637: Performed by: STUDENT IN AN ORGANIZED HEALTH CARE EDUCATION/TRAINING PROGRAM

## 2021-05-14 PROCEDURE — 99232 SBSQ HOSP IP/OBS MODERATE 35: CPT | Mod: 25 | Performed by: NURSE PRACTITIONER

## 2021-05-14 PROCEDURE — 97116 GAIT TRAINING THERAPY: CPT | Mod: GP

## 2021-05-14 PROCEDURE — 93306 TTE W/DOPPLER COMPLETE: CPT | Mod: 26 | Performed by: INTERNAL MEDICINE

## 2021-05-14 PROCEDURE — 83735 ASSAY OF MAGNESIUM: CPT | Performed by: PHYSICIAN ASSISTANT

## 2021-05-14 PROCEDURE — 85027 COMPLETE CBC AUTOMATED: CPT | Performed by: PHYSICIAN ASSISTANT

## 2021-05-14 PROCEDURE — 71045 X-RAY EXAM CHEST 1 VIEW: CPT | Mod: 26 | Performed by: RADIOLOGY

## 2021-05-14 PROCEDURE — 97110 THERAPEUTIC EXERCISES: CPT | Mod: GO

## 2021-05-14 PROCEDURE — 250N000011 HC RX IP 250 OP 636: Performed by: PHYSICIAN ASSISTANT

## 2021-05-14 PROCEDURE — 36592 COLLECT BLOOD FROM PICC: CPT | Performed by: SURGERY

## 2021-05-14 PROCEDURE — 214N000001 HC R&B CCU UMMC

## 2021-05-14 PROCEDURE — 86140 C-REACTIVE PROTEIN: CPT | Performed by: PHYSICIAN ASSISTANT

## 2021-05-14 PROCEDURE — 97530 THERAPEUTIC ACTIVITIES: CPT | Mod: GP

## 2021-05-14 PROCEDURE — 71045 X-RAY EXAM CHEST 1 VIEW: CPT

## 2021-05-14 PROCEDURE — 250N000012 HC RX MED GY IP 250 OP 636 PS 637: Performed by: NURSE PRACTITIONER

## 2021-05-14 PROCEDURE — 36415 COLL VENOUS BLD VENIPUNCTURE: CPT | Performed by: PHYSICIAN ASSISTANT

## 2021-05-14 PROCEDURE — 80076 HEPATIC FUNCTION PANEL: CPT | Performed by: PHYSICIAN ASSISTANT

## 2021-05-14 PROCEDURE — 250N000013 HC RX MED GY IP 250 OP 250 PS 637: Performed by: PHYSICIAN ASSISTANT

## 2021-05-14 PROCEDURE — 999N001017 HC STATISTIC GLUCOSE BY METER IP

## 2021-05-14 PROCEDURE — 250N000012 HC RX MED GY IP 250 OP 636 PS 637: Performed by: STUDENT IN AN ORGANIZED HEALTH CARE EDUCATION/TRAINING PROGRAM

## 2021-05-14 PROCEDURE — 80048 BASIC METABOLIC PNL TOTAL CA: CPT | Performed by: PHYSICIAN ASSISTANT

## 2021-05-14 PROCEDURE — 250N000011 HC RX IP 250 OP 636: Performed by: NURSE PRACTITIONER

## 2021-05-14 RX ORDER — BUMETANIDE 2 MG/1
2 TABLET ORAL
Status: DISCONTINUED | OUTPATIENT
Start: 2021-05-14 | End: 2021-05-16

## 2021-05-14 RX ORDER — TACROLIMUS 1 MG/1
2 CAPSULE ORAL
Status: DISCONTINUED | OUTPATIENT
Start: 2021-05-14 | End: 2021-05-17

## 2021-05-14 RX ORDER — HYDROMORPHONE HYDROCHLORIDE 2 MG/1
2-4 TABLET ORAL EVERY 4 HOURS PRN
Status: DISCONTINUED | OUTPATIENT
Start: 2021-05-14 | End: 2021-05-25

## 2021-05-14 RX ADMIN — PANTOPRAZOLE SODIUM 40 MG: 40 TABLET, DELAYED RELEASE ORAL at 09:21

## 2021-05-14 RX ADMIN — GABAPENTIN 300 MG: 300 CAPSULE ORAL at 09:24

## 2021-05-14 RX ADMIN — NYSTATIN 1000000 UNITS: 500000 SUSPENSION ORAL at 20:17

## 2021-05-14 RX ADMIN — HYDROMORPHONE HYDROCHLORIDE 2 MG: 2 TABLET ORAL at 20:16

## 2021-05-14 RX ADMIN — MYCOPHENOLATE MOFETIL 1500 MG: 250 CAPSULE ORAL at 09:19

## 2021-05-14 RX ADMIN — MONTELUKAST 10 MG: 10 TABLET, FILM COATED ORAL at 22:11

## 2021-05-14 RX ADMIN — TACROLIMUS 1 MG: 1 CAPSULE ORAL at 09:25

## 2021-05-14 RX ADMIN — TERBUTALINE SULFATE 2.5 MG: 2.5 TABLET ORAL at 13:22

## 2021-05-14 RX ADMIN — HEPARIN SODIUM 5000 UNITS: 5000 INJECTION, SOLUTION INTRAVENOUS; SUBCUTANEOUS at 13:21

## 2021-05-14 RX ADMIN — BUPROPION HYDROCHLORIDE 100 MG: 100 TABLET, EXTENDED RELEASE ORAL at 09:20

## 2021-05-14 RX ADMIN — NYSTATIN 1000000 UNITS: 500000 SUSPENSION ORAL at 13:21

## 2021-05-14 RX ADMIN — DAPSONE 50 MG: 25 TABLET ORAL at 09:20

## 2021-05-14 RX ADMIN — POLYETHYLENE GLYCOL 3350 17 G: 17 POWDER, FOR SOLUTION ORAL at 09:26

## 2021-05-14 RX ADMIN — BUMETANIDE 2 MG: 2 TABLET ORAL at 16:19

## 2021-05-14 RX ADMIN — INSULIN ASPART 2 UNITS: 100 INJECTION, SOLUTION INTRAVENOUS; SUBCUTANEOUS at 13:26

## 2021-05-14 RX ADMIN — Medication 37.5 MG: at 22:10

## 2021-05-14 RX ADMIN — Medication 1 TABLET: at 20:16

## 2021-05-14 RX ADMIN — MYCOPHENOLATE MOFETIL 1500 MG: 250 CAPSULE ORAL at 20:16

## 2021-05-14 RX ADMIN — BUMETANIDE 2 MG: 2 TABLET ORAL at 09:19

## 2021-05-14 RX ADMIN — FLUTICASONE FUROATE AND VILANTEROL TRIFENATATE 1 PUFF: 100; 25 POWDER RESPIRATORY (INHALATION) at 09:27

## 2021-05-14 RX ADMIN — INSULIN ASPART 3 UNITS: 100 INJECTION, SOLUTION INTRAVENOUS; SUBCUTANEOUS at 19:03

## 2021-05-14 RX ADMIN — UMECLIDINIUM 1 PUFF: 62.5 AEROSOL, POWDER ORAL at 09:29

## 2021-05-14 RX ADMIN — GABAPENTIN 600 MG: 300 CAPSULE ORAL at 13:22

## 2021-05-14 RX ADMIN — SODIUM CHLORIDE, PRESERVATIVE FREE 5 ML: 5 INJECTION INTRAVENOUS at 05:18

## 2021-05-14 RX ADMIN — ONDANSETRON 4 MG: 2 INJECTION INTRAMUSCULAR; INTRAVENOUS at 08:38

## 2021-05-14 RX ADMIN — PREDNISONE 30 MG: 20 TABLET ORAL at 20:17

## 2021-05-14 RX ADMIN — HYDROMORPHONE HYDROCHLORIDE 2 MG: 2 TABLET ORAL at 23:52

## 2021-05-14 RX ADMIN — ACETAMINOPHEN 975 MG: 325 TABLET, FILM COATED ORAL at 06:24

## 2021-05-14 RX ADMIN — Medication 50 MG: at 22:10

## 2021-05-14 RX ADMIN — SODIUM CHLORIDE, PRESERVATIVE FREE 5 ML: 5 INJECTION INTRAVENOUS at 17:50

## 2021-05-14 RX ADMIN — Medication 5 ML: at 13:23

## 2021-05-14 RX ADMIN — Medication 1 TABLET: at 09:22

## 2021-05-14 RX ADMIN — HEPARIN SODIUM 5000 UNITS: 5000 INJECTION, SOLUTION INTRAVENOUS; SUBCUTANEOUS at 20:17

## 2021-05-14 RX ADMIN — HYDROMORPHONE HYDROCHLORIDE 0.4 MG: 1 INJECTION, SOLUTION INTRAMUSCULAR; INTRAVENOUS; SUBCUTANEOUS at 06:30

## 2021-05-14 RX ADMIN — TERBUTALINE SULFATE 2.5 MG: 2.5 TABLET ORAL at 06:24

## 2021-05-14 RX ADMIN — GABAPENTIN 600 MG: 300 CAPSULE ORAL at 22:10

## 2021-05-14 RX ADMIN — BUPROPION HYDROCHLORIDE 100 MG: 100 TABLET, EXTENDED RELEASE ORAL at 20:16

## 2021-05-14 RX ADMIN — PREDNISONE 35 MG: 20 TABLET ORAL at 09:24

## 2021-05-14 RX ADMIN — ALLOPURINOL 300 MG: 300 TABLET ORAL at 09:23

## 2021-05-14 RX ADMIN — ASPIRIN 81 MG CHEWABLE TABLET 81 MG: 81 TABLET CHEWABLE at 09:21

## 2021-05-14 RX ADMIN — ROSUVASTATIN CALCIUM 10 MG: 10 TABLET, FILM COATED ORAL at 09:21

## 2021-05-14 RX ADMIN — VALGANCICLOVIR 900 MG: 450 TABLET, FILM COATED ORAL at 09:20

## 2021-05-14 RX ADMIN — HUMAN ALBUMIN MICROSPHERES AND PERFLUTREN 5 ML: 10; .22 INJECTION, SOLUTION INTRAVENOUS at 12:50

## 2021-05-14 RX ADMIN — Medication 5 ML: at 10:21

## 2021-05-14 RX ADMIN — ACETAMINOPHEN 975 MG: 325 TABLET, FILM COATED ORAL at 13:22

## 2021-05-14 RX ADMIN — TACROLIMUS 2 MG: 1 CAPSULE ORAL at 19:02

## 2021-05-14 RX ADMIN — INSULIN ASPART 7 UNITS: 100 INJECTION, SOLUTION INTRAVENOUS; SUBCUTANEOUS at 09:28

## 2021-05-14 RX ADMIN — INSULIN GLARGINE 15 UNITS: 100 INJECTION, SOLUTION SUBCUTANEOUS at 10:22

## 2021-05-14 RX ADMIN — NYSTATIN 1000000 UNITS: 500000 SUSPENSION ORAL at 16:19

## 2021-05-14 RX ADMIN — HYDROMORPHONE HYDROCHLORIDE 0.4 MG: 1 INJECTION, SOLUTION INTRAMUSCULAR; INTRAVENOUS; SUBCUTANEOUS at 13:55

## 2021-05-14 RX ADMIN — ACETAMINOPHEN 975 MG: 325 TABLET, FILM COATED ORAL at 22:10

## 2021-05-14 ASSESSMENT — ACTIVITIES OF DAILY LIVING (ADL)
ADLS_ACUITY_SCORE: 13
ADLS_ACUITY_SCORE: 14
ADLS_ACUITY_SCORE: 13
ADLS_ACUITY_SCORE: 14

## 2021-05-14 ASSESSMENT — MIFFLIN-ST. JEOR: SCORE: 1809.14

## 2021-05-14 NOTE — PLAN OF CARE
OT: session attempted this AM and pt declined due to fatigue and working with PT earlier this date.

## 2021-05-14 NOTE — PROGRESS NOTES
Cardiovascular Surgery Progress Note  05/14/2021         Assessment and Plan:     Mr. Jim Willingham is a 65 year old male with history of NICM EF 20% c/b VT s/p CRT-D s/p HMII LVAD 6/19/2017 originally intended as destination therapy 2/2 obesity however with recent weight loss now candidate for transplantation. He was admitted 2/8/21 for worsening functional status and renal function concerning for worsening heart failure. He was eventually listed status 2E for transplant and received a donor offer.   Jim is now s/p orthotopic heart transplant on 5/6/21 with Dr. Ronnie Quigley. He was shocked > 2x received amio, mag and lidocaine while coming off bypass. Known lung injury with isolated air leak to right pleural.      Cardiovascular:   Hx of NICM EF 20%, VT (ICD)  Hx HeartMate II LVAD 6/19/2017  Cardiorenal syndrome   S/P orthotopic heart transplant   No arrhythmia, HD stable.  Most recent echo showed LV EF 65-70% on 5/9/21.  Basiliximab 5/10.  Terbutaline 2.5 mg TID.    ASA 81 mg, Crestor 10 mg daily.   Diuresis with Bumex 2 mg PO BID, Cards II following.   Chest tubes: Airleak isolated to Right pleural. Had some CP after walking off suction 5/13, needs portable suction while ambulating.   TPW: removed.       Pulmonary:  Hx COPD and CECILIA on CPAP  Airleak from Right pleural tube (injury during surgery)   - PTA Breo ellipta, Incruse ellipta, and Singulair   - Extubated POD 1 to 4 lpm via NC. Now saturating well on RA.   - Supplemental O2 PRN to keep sats > 92%. Wean off as tolerated.  - Pulm toilet, IS, activity and deep breathing     Neurology / MSK:  Hx Depression  - PTA bupropion and amitriptyline  Post-op Status  - Neuro intact  - Acute post-operative pain well controlled with acetaminophen, PO dilaudid PRN, IV dilaudid PRN      / Renal:  WALTER on CKD stage III   - Cardiorenal syndrome improving. Most recent creatinine 1.33, adequate UOP.   - Day before surgery weight 102.3 kg.      GI / FEN:   Hx Sylvester-en-Y  "gastric bypass  Non-severe Mild Protein-Calorie Malnutrition   - Regular diet with supplements, continue bowel regimen  - Replace electrolytes as needed, hepatic enzymes WNL.     Endocrine:  DMT2  Gout   Stress induced hyperglycemia initially managed on insulin drip postop, transitioned to NPH BID and sliding scale. Converted to Lantus 5/14 am, continue prandial and sliding corrective scale.   - PTA allopurinol.      Infectious Disease:  Stress induced leukocytosis  - WBC 13.4, remains afebrile, no signs or symptoms of infection  - Completed perioperative antibiotics     Hematology:   Acute blood loss anemia and thrombocytopenia  Hgb 7.6; Plt 231, no signs or symptoms of active bleeding     Anticoagulation:   - ASA only     Prophylaxis:   - Stress ulcer prophylaxis: Pantoprazole 40 mg daily for 30 days  - DVT prophylaxis: Subcutaneous heparin, SCD     Disposition:   - Transferred to  on 5/10   - Therapies recommending discharge to Home vs ARU    Discussed with CVTS Fellow as needed.  Staff surgeons have been informed of changes through both verbal and written communication.      Nash Amado PA-C  Cardiothoracic Surgery  Pager [phone]    9:36 AM   May 14, 2021        Interval History:     No overnight events.  Feeling good overall.   States pain is well managed on current regimen. Slept well overnight.  Tolerating diet with only occasional nausea, is passing flatus, + BM.   Breathing well without complaints, occasional cough.   Working with therapies and ambulating in halls without assistance.   Denies chest pain, palpitations, dizziness, syncopal symptoms, fevers, chills, myalgias, or sternal popping/clicking.         Physical Exam:   Blood pressure 93/63, pulse 97, temperature 97.6  F (36.4  C), temperature source Oral, resp. rate 16, height 1.727 m (5' 8\"), weight 104 kg (229 lb 3.2 oz), SpO2 99 %.  Vitals:    05/12/21 0022 05/13/21 0602 05/14/21 0200   Weight: 110.2 kg (242 lb 14.4 oz) 108.2 kg (238 lb " 8 oz) 104 kg (229 lb 3.2 oz)      Weight; + 2 kg since admit and trending down.   24 hr Fluid status; net loss 2.3 L.   MAPs: 71 - 91    Gen: A&Ox4, NAD  Neuro: no focal deficits   CV: RRR, normal S1 S2, no murmurs, rubs or gallops.     * removed TPW without immediate complication  Pulm: CTA, no wheezing or rhonchi, normal breathing on RA  Abd: nondistended, normal BS, soft, nontender  Ext: moderate peripheral edema, 1+ pitting  Incision: clean, dry, intact, no erythema, sternum stable  Tubes/drain sites: dressing clean and dry, serosanguinous output, air leak isolated to R pleural. 24 hr output 270 mL.     * Pulled pericardial and left pleural tube without immediate complication         Data:    Imaging:  reviewed recent imaging, no acute concerns    Labs:  BMP  Recent Labs   Lab 05/14/21  0521 05/13/21  1646 05/13/21  0534 05/12/21  1736   * 131* 133 132*   POTASSIUM 4.5 4.7 4.3 4.6   CHLORIDE 98 98 97 100   QAMAR 8.1* 7.9* 7.9* 7.6*   CO2 29 28 29 28   BUN 40* 42* 45* 52*   CR 1.33* 1.22 1.31* 1.48*   * 158* 159* 185*     CBC  Recent Labs   Lab 05/14/21 0521 05/13/21  0534 05/12/21  0622 05/11/21  0542   WBC 13.4* 14.9* 11.1* 11.6*   RBC 2.55* 2.81* 2.45* 2.59*   HGB 7.6* 8.3* 7.3* 8.1*   HCT 24.3* 26.5* 23.3* 24.7*   MCV 95 94 95 95   MCH 29.8 29.5 29.8 31.3   MCHC 31.3* 31.3* 31.3* 32.8   RDW 15.2* 15.1* 15.3* 15.5*    205 148* 130*     INR  Recent Labs   Lab 05/12/21  0622 05/10/21  0330   INR 1.19* 1.33*      Hepatic Panel  Recent Labs   Lab 05/14/21 0521 05/12/21  0622 05/11/21  0542 05/10/21  0330   AST 32 33 28 34   ALT 47 52 49 48   ALKPHOS 70 71 73 78   BILITOTAL 0.6 0.5 0.6 0.6   ALBUMIN 2.5* 2.7* 2.8* 2.8*     GLUCOSE:   Recent Labs   Lab 05/14/21  0734 05/14/21  0521 05/14/21  0020 05/13/21  2034 05/13/21  1646 05/13/21  1349 05/13/21  1053 05/13/21  0534 05/12/21  2156 05/12/21  1736 05/12/21  1736 05/12/21  0622 05/12/21  0622 05/11/21  1752 05/11/21  1752   GLC  --  177*  --    --  158*  --   --  159*  --   --  185*  --  180*  --  154*   *  --  166* 151*  --  166* 136*  --  198*   < >  --    < >  --    < >  --     < > = values in this interval not displayed.

## 2021-05-14 NOTE — PROGRESS NOTES
[facility]   Cardiology II Service / Advanced Heart Failure  Daily Progress Note  Date of Service: 5/14/2021      Patient: Jim Willingham  MRN: [id number]  Admission Date: 2/8/2021  Hospital Day # 95    Assessment and Plan: Jim Willingham is a 65 year old male with history of NICM EF 20% c/b VT s/p CRT-D s/p HMII LVAD 6/19/2017 originally intended as destination therapy 2/2 obesity however with recent weight loss now candidate for transplantation. He is admitted for decompensated heart failure with subsequent s/p OHT 5/6/21.     Recommendations today:   - Discontinue Terbutaline.  - Decrease Bumex to 2 mg po BID, maintain euvolemic state.   - Increase Tac to 2 mg po BID. Daily tac levels.      S/p OHT. 5/6/21 secondary to NICM. Echo 5/13/21 conistent with EF 65-70%, mildly decreased RV function, and dilated IVC. RHC 5/13/21 with mRA-5, RV-30/5, mPA-20, mPCWP-10, MICHELLE CO-5.8, and MICHELLE CI-2.63, and SVR-914.   Immunosuppression: Simulect induction 5/6 and 5/10. Tac increased to 2 mg po BID, level < 3. Goal 10-12. Prednisone taper. Cellcept 1500 mg po BID.   Serostatus: CMV: D?, R+, EBV: R+, Toxo R-  Prophylaxis: Dapsone, Nystatin, and Valcyte (3-6 months pending donor status).   - IV Bumex transitioned to bumex 2 mg po BID pending cath numbers.   - Terbutaline discontinued.   - Continue Crestor and ASA.   - DSA pending.      Leukocytosis, stable. Chest X-ray 5/12/21 consistent with small right apical pneumo, small left pleural effusion, and bilateral opacities.   - WBC-13.4 (14.9).   - Repeat Chest X-ray 5/13 consistent with right apical pneumo, stable bilateral opacities, trace bilateral pleural effusions.     Right Apical Pneumothorax.   - Management per CVTS.   ================================================================  Interval History/ROS: He complains of bowel incontinence this AM. He denies fever, chills, chest pain, palpitations, cough, diarrhea. LE edema unchanged. He notes nausea with  "emesis last HS, but did not vomit medications. He tolerated breakfast well this AM. He is ambulating well with therapy.     Last 24 hr care team notes reviewed.   ROS:  4 point ROS including Respiratory, CV, GI and , other than that noted in the HPI, is negative.     Medications: Reviewed in EPIC.     Physical Exam:   /88 (BP Location: Left arm)   Pulse 99   Temp 97.8  F (36.6  C) (Oral)   Resp 18   Ht 1.727 m (5' 8\")   Wt 104 kg (229 lb 3.2 oz)   SpO2 99%   BMI 34.85 kg/m    GENERAL: Appears alert and oriented times three.   HEENT: Eye symmetrical and free of discharge bilaterally. Mucous membranes moist and without lesions.  NECK: Supple and without lymphadenopathy. JVD below clavicular line.    CV: RRR, S1S2 present without murmur, rub, and gallop.   RESPIRATORY: Respirations regular, even, and unlabored. Lungs CTA throughout.   GI: Soft and non distended with normoactive bowel sounds present in all quadrants. No tenderness, rebound, guarding. No organomegaly.   EXTREMITIES: Trace bilateral LE peripheral edema. 2+ bilateral pedal pulses.   NEUROLOGIC: Alert and orientated x 3. CN II-XII grossly intact. No focal deficits.   MUSCULOSKELETAL: No joint swelling or tenderness.   SKIN: No jaundice. No rashes or lesions.     Data:  CMP  Recent Labs   Lab 05/14/21  0521 05/13/21  1646 05/13/21  0534 05/12/21  1736 05/12/21  0622 05/11/21  0542 05/11/21  0542 05/10/21  0330   * 131* 133 132* 132*   < > 134 134   POTASSIUM 4.5 4.7 4.3 4.6 4.7   < > 4.6 3.8   CHLORIDE 98 98 97 100 100   < > 101 102   CO2 29 28 29 28 27   < > 27 26   ANIONGAP 5 5 7 4 4   < > 6 7   * 158* 159* 185* 180*   < > 158* 188*   BUN 40* 42* 45* 52* 54*   < > 72* 90*   CR 1.33* 1.22 1.31* 1.48* 1.40*   < > 1.65* 1.96*   GFRESTIMATED 56* 62 57* 49* 53*   < > 43* 35*   GFRESTBLACK 65 72 66 57* 61   < > 50* 41*   QAMAR 8.1* 7.9* 7.9* 7.6* 7.8*   < > 7.6* 8.0*   MAG 1.9  --   --   --  2.3  --   --  2.7*   PROTTOTAL 4.7*  --   --  "  --  4.9*  --  5.0* 5.0*   ALBUMIN 2.5*  --   --   --  2.7*  --  2.8* 2.8*   BILITOTAL 0.6  --   --   --  0.5  --  0.6 0.6   ALKPHOS 70  --   --   --  71  --  73 78   AST 32  --   --   --  33  --  28 34   ALT 47  --   --   --  52  --  49 48    < > = values in this interval not displayed.     CBC  Recent Labs   Lab 05/14/21  1022 05/14/21  0521 05/13/21  0534 05/12/21  0622 05/11/21  0542   WBC  --  13.4* 14.9* 11.1* 11.6*   RBC  --  2.55* 2.81* 2.45* 2.59*   HGB 8.5* 7.6* 8.3* 7.3* 8.1*   HCT  --  24.3* 26.5* 23.3* 24.7*   MCV  --  95 94 95 95   MCH  --  29.8 29.5 29.8 31.3   MCHC  --  31.3* 31.3* 31.3* 32.8   RDW  --  15.2* 15.1* 15.3* 15.5*   PLT  --  231 205 148* 130*     INR  Recent Labs   Lab 05/12/21  0622 05/10/21  0330   INR 1.19* 1.33*       Patient discussed with Dr. Seth.      Soraya Marshall [facility]  5/14/2021

## 2021-05-14 NOTE — PLAN OF CARE
D: Patient admitted with worsening HF, now s/p heart transplant 5/6/21.  Past Medical History: NICM s/p HM2 (2017), Afib, RV Failure, CKD, DM, CECILIA, HTN, and asthma    I: Patient condition monitored and assessed, nursing interventions completed as appropriate/ordered.    A: A&Ox4, VSS, afebrile, on RA. Sinus Rhythm/Sinus Tach on the tele monitor. Incisional pain relieved with IV dilaudid x1. Of note, oral pain medication option is oxycodone, which patient has history of confusion and some delirium when taking this. Oral pain medication to be changed from oxycodone to dilaudid. Weight decreased by ~9 lbs from yesterday, IV bumex changed to oral route. Two chest tubes and pacer wires removed today. Echo done at bedside. Hgb 7.6 this am-->stat re-check improved at 8.5, no further interventions needed. Blood sugar coverage via sliding scale and carb coverage with meals. Adequate oral intake. Large, formed BM this AM.     P: Per , Jim received the okay to do a 'window' visit with his grand-daughter/daughter, Shirley, from [facility] with CVTS-as long as he is with a nurse when off the floor to do the window visit.  Continue with post-op plan of care. Contact CVTS for any questions and/or new concerns.    Xiomara Yuan, REGINALD  Cardiology

## 2021-05-14 NOTE — PLAN OF CARE
Temp: 97.7  F (36.5  C) Temp src: Oral BP: 96/61 Pulse: 101   Resp: 16 SpO2: 96 % O2 Device: None (Room air)       D: Admitted with worsening HF, now s/p OHT 5/6 Hx NICM s/p HM2 (2017), afib, RV failure, CKD, DM, CECILIA, HTN, asthma     I/A: Jim (he/him) is A&O x4. Tele in place, SR. VSS on RA. PICC in place, hep locked. CT x3 in place, air leak to R pleural (team aware). Incisions WDL. Old DL site dressing CDI. Suppository given this shift with good results. Dilaudid given x1 for pain. Up with assist of 1, GB, and FWW. Slept well overnight    P: Continue to monitor and follow POC. Notify CVTS with changes

## 2021-05-15 ENCOUNTER — APPOINTMENT (OUTPATIENT)
Dept: GENERAL RADIOLOGY | Facility: CLINIC | Age: 66
DRG: 001 | End: 2021-05-15
Attending: PHYSICIAN ASSISTANT
Payer: COMMERCIAL

## 2021-05-15 ENCOUNTER — APPOINTMENT (OUTPATIENT)
Dept: PHYSICAL THERAPY | Facility: CLINIC | Age: 66
DRG: 001 | End: 2021-05-15
Attending: INTERNAL MEDICINE
Payer: COMMERCIAL

## 2021-05-15 LAB
ALBUMIN UR-MCNC: NORMAL MG/DL
ANION GAP SERPL CALCULATED.3IONS-SCNC: 4 MMOL/L (ref 3–14)
ANION GAP SERPL CALCULATED.3IONS-SCNC: 6 MMOL/L (ref 3–14)
APPEARANCE UR: NORMAL
BASOPHILS # BLD AUTO: 0 10E9/L (ref 0–0.2)
BASOPHILS NFR BLD AUTO: 0.1 %
BILIRUB UR QL STRIP: NORMAL
BUN SERPL-MCNC: 41 MG/DL (ref 7–30)
BUN SERPL-MCNC: 42 MG/DL (ref 7–30)
CALCIUM SERPL-MCNC: 7.8 MG/DL (ref 8.5–10.1)
CALCIUM SERPL-MCNC: 8 MG/DL (ref 8.5–10.1)
CHLORIDE SERPL-SCNC: 97 MMOL/L (ref 94–109)
CHLORIDE SERPL-SCNC: 98 MMOL/L (ref 94–109)
CO2 SERPL-SCNC: 28 MMOL/L (ref 20–32)
CO2 SERPL-SCNC: 29 MMOL/L (ref 20–32)
COLOR UR AUTO: NORMAL
CREAT SERPL-MCNC: 1.44 MG/DL (ref 0.66–1.25)
CREAT SERPL-MCNC: 1.81 MG/DL (ref 0.66–1.25)
CRP SERPL-MCNC: 9.8 MG/L (ref 0–8)
DIFFERENTIAL METHOD BLD: ABNORMAL
EOSINOPHIL # BLD AUTO: 0 10E9/L (ref 0–0.7)
EOSINOPHIL NFR BLD AUTO: 0 %
ERYTHROCYTE [DISTWIDTH] IN BLOOD BY AUTOMATED COUNT: 15 % (ref 10–15)
GFR SERPL CREATININE-BSD FRML MDRD: 39 ML/MIN/{1.73_M2}
GFR SERPL CREATININE-BSD FRML MDRD: 51 ML/MIN/{1.73_M2}
GLUCOSE BLDC GLUCOMTR-MCNC: 100 MG/DL (ref 70–99)
GLUCOSE BLDC GLUCOMTR-MCNC: 136 MG/DL (ref 70–99)
GLUCOSE BLDC GLUCOMTR-MCNC: 148 MG/DL (ref 70–99)
GLUCOSE SERPL-MCNC: 145 MG/DL (ref 70–99)
GLUCOSE SERPL-MCNC: 167 MG/DL (ref 70–99)
GLUCOSE UR STRIP-MCNC: NORMAL MG/DL
HCT VFR BLD AUTO: 26 % (ref 40–53)
HGB BLD-MCNC: 8.2 G/DL (ref 13.3–17.7)
HGB UR QL STRIP: NORMAL
IMM GRANULOCYTES # BLD: 0.3 10E9/L (ref 0–0.4)
IMM GRANULOCYTES NFR BLD: 1.7 %
KETONES UR STRIP-MCNC: NORMAL MG/DL
LACTATE BLD-SCNC: 1.9 MMOL/L (ref 0.7–2)
LEUKOCYTE ESTERASE UR QL STRIP: NORMAL
LYMPHOCYTES # BLD AUTO: 0.6 10E9/L (ref 0.8–5.3)
LYMPHOCYTES NFR BLD AUTO: 3.4 %
MCH RBC QN AUTO: 30.6 PG (ref 26.5–33)
MCHC RBC AUTO-ENTMCNC: 31.5 G/DL (ref 31.5–36.5)
MCV RBC AUTO: 97 FL (ref 78–100)
MONOCYTES # BLD AUTO: 0.9 10E9/L (ref 0–1.3)
MONOCYTES NFR BLD AUTO: 5.4 %
NEUTROPHILS # BLD AUTO: 15.7 10E9/L (ref 1.6–8.3)
NEUTROPHILS NFR BLD AUTO: 89.4 %
NITRATE UR QL: NORMAL
PH UR STRIP: NORMAL PH (ref 5–7)
PLATELET # BLD AUTO: 284 10E9/L (ref 150–450)
POTASSIUM SERPL-SCNC: 4.1 MMOL/L (ref 3.4–5.3)
POTASSIUM SERPL-SCNC: 4.3 MMOL/L (ref 3.4–5.3)
PROCALCITONIN SERPL-MCNC: 0.37 NG/ML
RBC # BLD AUTO: 2.68 10E12/L (ref 4.4–5.9)
SODIUM SERPL-SCNC: 131 MMOL/L (ref 133–144)
SODIUM SERPL-SCNC: 132 MMOL/L (ref 133–144)
SOURCE: NORMAL
SP GR UR STRIP: NORMAL (ref 1–1.03)
TACROLIMUS BLD-MCNC: <3 UG/L (ref 5–15)
TME LAST DOSE: ABNORMAL H
UROBILINOGEN UR STRIP-MCNC: NORMAL MG/DL (ref 0–2)
VIT B1 SERPL-MCNC: 6 NMOL/L (ref 4–15)
WBC # BLD AUTO: 17.6 10E9/L (ref 4–11)

## 2021-05-15 PROCEDURE — 85004 AUTOMATED DIFF WBC COUNT: CPT | Performed by: PHYSICIAN ASSISTANT

## 2021-05-15 PROCEDURE — 250N000013 HC RX MED GY IP 250 OP 250 PS 637: Performed by: SURGERY

## 2021-05-15 PROCEDURE — 250N000013 HC RX MED GY IP 250 OP 250 PS 637: Performed by: NURSE PRACTITIONER

## 2021-05-15 PROCEDURE — 36592 COLLECT BLOOD FROM PICC: CPT | Performed by: PHYSICIAN ASSISTANT

## 2021-05-15 PROCEDURE — 250N000012 HC RX MED GY IP 250 OP 636 PS 637: Performed by: SURGERY

## 2021-05-15 PROCEDURE — 84145 PROCALCITONIN (PCT): CPT | Performed by: PHYSICIAN ASSISTANT

## 2021-05-15 PROCEDURE — 36592 COLLECT BLOOD FROM PICC: CPT | Performed by: SURGERY

## 2021-05-15 PROCEDURE — 250N000012 HC RX MED GY IP 250 OP 636 PS 637: Performed by: NURSE PRACTITIONER

## 2021-05-15 PROCEDURE — 86140 C-REACTIVE PROTEIN: CPT | Performed by: PHYSICIAN ASSISTANT

## 2021-05-15 PROCEDURE — 97116 GAIT TRAINING THERAPY: CPT | Mod: GP | Performed by: STUDENT IN AN ORGANIZED HEALTH CARE EDUCATION/TRAINING PROGRAM

## 2021-05-15 PROCEDURE — 36415 COLL VENOUS BLD VENIPUNCTURE: CPT | Performed by: NURSE PRACTITIONER

## 2021-05-15 PROCEDURE — 87040 BLOOD CULTURE FOR BACTERIA: CPT | Performed by: PHYSICIAN ASSISTANT

## 2021-05-15 PROCEDURE — 250N000013 HC RX MED GY IP 250 OP 250 PS 637: Performed by: STUDENT IN AN ORGANIZED HEALTH CARE EDUCATION/TRAINING PROGRAM

## 2021-05-15 PROCEDURE — 85027 COMPLETE CBC AUTOMATED: CPT | Performed by: PHYSICIAN ASSISTANT

## 2021-05-15 PROCEDURE — 80197 ASSAY OF TACROLIMUS: CPT | Performed by: NURSE PRACTITIONER

## 2021-05-15 PROCEDURE — 71045 X-RAY EXAM CHEST 1 VIEW: CPT | Mod: 26 | Performed by: RADIOLOGY

## 2021-05-15 PROCEDURE — 83605 ASSAY OF LACTIC ACID: CPT | Performed by: SURGERY

## 2021-05-15 PROCEDURE — 999N000157 HC STATISTIC RCP TIME EA 10 MIN

## 2021-05-15 PROCEDURE — 250N000013 HC RX MED GY IP 250 OP 250 PS 637: Performed by: PHYSICIAN ASSISTANT

## 2021-05-15 PROCEDURE — 80048 BASIC METABOLIC PNL TOTAL CA: CPT | Performed by: NURSE PRACTITIONER

## 2021-05-15 PROCEDURE — 250N000012 HC RX MED GY IP 250 OP 636 PS 637: Performed by: STUDENT IN AN ORGANIZED HEALTH CARE EDUCATION/TRAINING PROGRAM

## 2021-05-15 PROCEDURE — 97530 THERAPEUTIC ACTIVITIES: CPT | Mod: GP | Performed by: STUDENT IN AN ORGANIZED HEALTH CARE EDUCATION/TRAINING PROGRAM

## 2021-05-15 PROCEDURE — 80048 BASIC METABOLIC PNL TOTAL CA: CPT | Performed by: PHYSICIAN ASSISTANT

## 2021-05-15 PROCEDURE — 999N001017 HC STATISTIC GLUCOSE BY METER IP

## 2021-05-15 PROCEDURE — 214N000001 HC R&B CCU UMMC

## 2021-05-15 PROCEDURE — 99232 SBSQ HOSP IP/OBS MODERATE 35: CPT | Performed by: PHYSICIAN ASSISTANT

## 2021-05-15 PROCEDURE — 71045 X-RAY EXAM CHEST 1 VIEW: CPT

## 2021-05-15 PROCEDURE — 250N000011 HC RX IP 250 OP 636: Performed by: PHYSICIAN ASSISTANT

## 2021-05-15 PROCEDURE — 250N000011 HC RX IP 250 OP 636: Performed by: NURSE PRACTITIONER

## 2021-05-15 RX ADMIN — NYSTATIN 1000000 UNITS: 500000 SUSPENSION ORAL at 15:36

## 2021-05-15 RX ADMIN — NYSTATIN 1000000 UNITS: 500000 SUSPENSION ORAL at 12:14

## 2021-05-15 RX ADMIN — HEPARIN SODIUM 5000 UNITS: 5000 INJECTION, SOLUTION INTRAVENOUS; SUBCUTANEOUS at 20:17

## 2021-05-15 RX ADMIN — Medication 37.5 MG: at 22:00

## 2021-05-15 RX ADMIN — BUPROPION HYDROCHLORIDE 100 MG: 100 TABLET, EXTENDED RELEASE ORAL at 20:18

## 2021-05-15 RX ADMIN — HYDROMORPHONE HYDROCHLORIDE 4 MG: 2 TABLET ORAL at 23:45

## 2021-05-15 RX ADMIN — MONTELUKAST 10 MG: 10 TABLET, FILM COATED ORAL at 22:01

## 2021-05-15 RX ADMIN — DAPSONE 50 MG: 25 TABLET ORAL at 09:04

## 2021-05-15 RX ADMIN — PREDNISONE 30 MG: 20 TABLET ORAL at 20:18

## 2021-05-15 RX ADMIN — BUMETANIDE 2 MG: 2 TABLET ORAL at 09:00

## 2021-05-15 RX ADMIN — HYDROMORPHONE HYDROCHLORIDE 2 MG: 2 TABLET ORAL at 17:18

## 2021-05-15 RX ADMIN — SENNOSIDES AND DOCUSATE SODIUM 1 TABLET: 8.6; 5 TABLET ORAL at 09:03

## 2021-05-15 RX ADMIN — Medication 50 MG: at 15:18

## 2021-05-15 RX ADMIN — ACETAMINOPHEN 975 MG: 325 TABLET, FILM COATED ORAL at 05:05

## 2021-05-15 RX ADMIN — INSULIN ASPART 2 UNITS: 100 INJECTION, SOLUTION INTRAVENOUS; SUBCUTANEOUS at 12:11

## 2021-05-15 RX ADMIN — TACROLIMUS 2 MG: 1 CAPSULE ORAL at 09:02

## 2021-05-15 RX ADMIN — Medication 5 ML: at 13:51

## 2021-05-15 RX ADMIN — TACROLIMUS 2 MG: 1 CAPSULE ORAL at 17:17

## 2021-05-15 RX ADMIN — NYSTATIN 1000000 UNITS: 500000 SUSPENSION ORAL at 20:19

## 2021-05-15 RX ADMIN — SODIUM CHLORIDE, PRESERVATIVE FREE 5 ML: 5 INJECTION INTRAVENOUS at 19:51

## 2021-05-15 RX ADMIN — POLYETHYLENE GLYCOL 3350 17 G: 17 POWDER, FOR SOLUTION ORAL at 08:59

## 2021-05-15 RX ADMIN — POLYETHYLENE GLYCOL 3350 17 G: 17 POWDER, FOR SOLUTION ORAL at 20:17

## 2021-05-15 RX ADMIN — ASPIRIN 81 MG CHEWABLE TABLET 81 MG: 81 TABLET CHEWABLE at 09:04

## 2021-05-15 RX ADMIN — FLUTICASONE FUROATE AND VILANTEROL TRIFENATATE 1 PUFF: 100; 25 POWDER RESPIRATORY (INHALATION) at 08:54

## 2021-05-15 RX ADMIN — NYSTATIN 1000000 UNITS: 500000 SUSPENSION ORAL at 09:09

## 2021-05-15 RX ADMIN — INSULIN ASPART 7 UNITS: 100 INJECTION, SOLUTION INTRAVENOUS; SUBCUTANEOUS at 18:20

## 2021-05-15 RX ADMIN — PANTOPRAZOLE SODIUM 40 MG: 40 TABLET, DELAYED RELEASE ORAL at 09:06

## 2021-05-15 RX ADMIN — UMECLIDINIUM 1 PUFF: 62.5 AEROSOL, POWDER ORAL at 08:54

## 2021-05-15 RX ADMIN — MYCOPHENOLATE MOFETIL 1500 MG: 250 CAPSULE ORAL at 20:18

## 2021-05-15 RX ADMIN — ACETAMINOPHEN 975 MG: 325 TABLET, FILM COATED ORAL at 22:00

## 2021-05-15 RX ADMIN — SODIUM CHLORIDE, PRESERVATIVE FREE 5 ML: 5 INJECTION INTRAVENOUS at 21:38

## 2021-05-15 RX ADMIN — HEPARIN SODIUM 5000 UNITS: 5000 INJECTION, SOLUTION INTRAVENOUS; SUBCUTANEOUS at 05:00

## 2021-05-15 RX ADMIN — SENNOSIDES AND DOCUSATE SODIUM 1 TABLET: 8.6; 5 TABLET ORAL at 20:18

## 2021-05-15 RX ADMIN — ALBUTEROL SULFATE 2 PUFF: 90 INHALANT RESPIRATORY (INHALATION) at 08:55

## 2021-05-15 RX ADMIN — ALLOPURINOL 300 MG: 300 TABLET ORAL at 09:05

## 2021-05-15 RX ADMIN — MYCOPHENOLATE MOFETIL 1500 MG: 250 CAPSULE ORAL at 09:01

## 2021-05-15 RX ADMIN — ACETAMINOPHEN 975 MG: 325 TABLET, FILM COATED ORAL at 13:44

## 2021-05-15 RX ADMIN — GABAPENTIN 300 MG: 300 CAPSULE ORAL at 09:05

## 2021-05-15 RX ADMIN — INSULIN ASPART 4 UNITS: 100 INJECTION, SOLUTION INTRAVENOUS; SUBCUTANEOUS at 08:53

## 2021-05-15 RX ADMIN — VALGANCICLOVIR 900 MG: 450 TABLET, FILM COATED ORAL at 09:03

## 2021-05-15 RX ADMIN — Medication 10 MG: at 22:00

## 2021-05-15 RX ADMIN — BUPROPION HYDROCHLORIDE 100 MG: 100 TABLET, EXTENDED RELEASE ORAL at 09:05

## 2021-05-15 RX ADMIN — Medication 50 MG: at 05:05

## 2021-05-15 RX ADMIN — PREDNISONE 30 MG: 20 TABLET ORAL at 09:06

## 2021-05-15 RX ADMIN — Medication 1 TABLET: at 09:05

## 2021-05-15 RX ADMIN — Medication 50 MG: at 22:01

## 2021-05-15 RX ADMIN — GABAPENTIN 600 MG: 300 CAPSULE ORAL at 22:00

## 2021-05-15 RX ADMIN — GABAPENTIN 600 MG: 300 CAPSULE ORAL at 13:45

## 2021-05-15 RX ADMIN — BUMETANIDE 2 MG: 2 TABLET ORAL at 15:36

## 2021-05-15 RX ADMIN — Medication 1 TABLET: at 20:19

## 2021-05-15 RX ADMIN — HEPARIN SODIUM 5000 UNITS: 5000 INJECTION, SOLUTION INTRAVENOUS; SUBCUTANEOUS at 12:05

## 2021-05-15 RX ADMIN — SODIUM CHLORIDE, PRESERVATIVE FREE 5 ML: 5 INJECTION INTRAVENOUS at 09:30

## 2021-05-15 RX ADMIN — ROSUVASTATIN CALCIUM 10 MG: 10 TABLET, FILM COATED ORAL at 08:58

## 2021-05-15 RX ADMIN — SODIUM CHLORIDE, PRESERVATIVE FREE 5 ML: 5 INJECTION INTRAVENOUS at 09:20

## 2021-05-15 ASSESSMENT — ACTIVITIES OF DAILY LIVING (ADL)
ADLS_ACUITY_SCORE: 14

## 2021-05-15 ASSESSMENT — MIFFLIN-ST. JEOR: SCORE: 1827.74

## 2021-05-15 NOTE — PROGRESS NOTES
[facility]   Cardiology II Service / Advanced Heart Failure  Daily Progress Note        Patient: Jim Willingham  MRN: [id number]  Admission Date: 2/8/2021  Hospital Day # 96    Assessment and Plan: Jim Willingham is a 65 year old male with history of NICM EF 20% c/b VT s/p CRT-D s/p HMII LVAD 6/19/2017 originally intended as destination therapy 2/2 obesity however with recent weight loss now candidate for transplantation. He is admitted for decompensated heart failure with subsequent s/p OHT 5/6/21.     Recommendations today:   - Agree with CXR, would get blood cultures and UA to assess leukocytosis. Agree with holding off on abx at this point. Trend daily.  - Check C. Diff if he develops diarrhea  - Continue Bumex to 2 mg po BID, maintain euvolemic state.   - Increase Tac to 2 mg po BID today (undetectable today)    S/p OHT. 5/6/21 secondary to NICM. Echo 5/13/21 conistent with EF 65-70%, mildly decreased RV function, and dilated IVC. RHC 5/13/21 with mRA-5, RV-30/5, mPA-20, mPCWP-10, MICHELLE CO-5.8, and MICHELLE CI-2.63, and SVR-914.   Immunosuppression: Simulect induction 5/6 and 5/10. Tac increased to 2 mg po BID, level < 3. Goal 10-12. Prednisone taper. Cellcept 1500 mg po BID.   Serostatus: CMV: D?, R+, EBV: R+, Toxo R-  Prophylaxis: Dapsone, Nystatin, and Valcyte (3-6 months pending donor status).   - Continue bumex 2 mg PO BID  - Terbutaline discontinued.   - Continue Crestor and ASA.   - DSA 5/13 pending  - 5/13 biopsy pending    Leukocytosis. No consolidation on CXR. No symptoms. Has been afebrile. CRP is low although note his significant immunsupression  - F/u Procalcitonin  - Recommend U/A and two sets of blood cultures  - Would Check C. Diff if diarrhea reoccurs  - Continue trending WBC  - WBC-17.6 (F13.4)    Right Apical Pneumothorax.   - Management per CVTS.   ================================================================  Interval History/ROS:  He denies fever, chills, chest pain,  "palpitations, cough, diarrhea. LE edema unchanged. No abdominal pain, nausea or vomiting. (vomitted a few nights ago which he feels like was related to hunger when he was NPO for a RHC). He tolerated breakfast well this AM. He is ambulating well with therapy.     Last 24 hr care team notes reviewed.   ROS:  4 point ROS including Respiratory, CV, GI and , other than that noted in the HPI, is negative.     Medications: Reviewed in EPIC.     Physical Exam:   /66 (BP Location: Left arm)   Pulse 97   Temp 97.8  F (36.6  C) (Oral)   Resp 18   Ht 1.727 m (5' 8\")   Wt 105.8 kg (233 lb 4.8 oz)   SpO2 97%   BMI 35.47 kg/m    GENERAL: Appears alert and interacting appropriately.   HEENT: Eye symmetrical and free of discharge bilaterally. Mucous membranes moist and without lesions.  NECK: Supple and without lymphadenopathy. JVD below clavicular line.    CV: RRR, S1S2 present without murmur, rub, and gallop.   RESPIRATORY: Respirations regular, even, and unlabored. Lungs CTA throughout.   GI: Soft and non distended with normoactive bowel sounds present in all quadrants. No tenderness, rebound, guarding. No organomegaly.   EXTREMITIES: Trace bilateral LE peripheral edema. 2+ bilateral pedal pulses.   NEUROLOGIC: Alert and interacting appropriatly. No focal deficits.   MUSCULOSKELETAL: No joint swelling or tenderness.   SKIN: No jaundice. No rashes or lesions.     Data:  CMP  Recent Labs   Lab 05/15/21  0533 05/14/21  1753 05/14/21  0521 05/13/21  1646 05/12/21  0622 05/12/21  0622 05/11/21  0542 05/11/21  0542 05/10/21  0330   * 130* 132* 131*   < > 132*   < > 134 134   POTASSIUM 4.3 4.4 4.5 4.7   < > 4.7   < > 4.6 3.8   CHLORIDE 97 97 98 98   < > 100   < > 101 102   CO2 29 29 29 28   < > 27   < > 27 26   ANIONGAP 4 5 5 5   < > 4   < > 6 7   * 212* 177* 158*   < > 180*   < > 158* 188*   BUN 41* 43* 40* 42*   < > 54*   < > 72* 90*   CR 1.44* 1.51* 1.33* 1.22   < > 1.40*   < > 1.65* 1.96*   GFRESTIMATED " 51* 48* 56* 62   < > 53*   < > 43* 35*   GFRESTBLACK 59* 56* 65 72   < > 61   < > 50* 41*   QAMAR 8.0* 7.8* 8.1* 7.9*   < > 7.8*   < > 7.6* 8.0*   MAG  --   --  1.9  --   --  2.3  --   --  2.7*   PROTTOTAL  --   --  4.7*  --   --  4.9*  --  5.0* 5.0*   ALBUMIN  --   --  2.5*  --   --  2.7*  --  2.8* 2.8*   BILITOTAL  --   --  0.6  --   --  0.5  --  0.6 0.6   ALKPHOS  --   --  70  --   --  71  --  73 78   AST  --   --  32  --   --  33  --  28 34   ALT  --   --  47  --   --  52  --  49 48    < > = values in this interval not displayed.     CBC  Recent Labs   Lab 05/15/21  0533 05/14/21  1022 05/14/21  0521 05/13/21  0534 05/12/21  0622   WBC 17.6*  --  13.4* 14.9* 11.1*   RBC 2.68*  --  2.55* 2.81* 2.45*   HGB 8.2* 8.5* 7.6* 8.3* 7.3*   HCT 26.0*  --  24.3* 26.5* 23.3*   MCV 97  --  95 94 95   MCH 30.6  --  29.8 29.5 29.8   MCHC 31.5  --  31.3* 31.3* 31.3*   RDW 15.0  --  15.2* 15.1* 15.3*     --  231 205 148*     INR  Recent Labs   Lab 05/12/21  0622 05/10/21  0330   INR 1.19* 1.33*       Patient discussed with Dr. Seth.      Didi Butler, PA-C  [facility] Cardiology

## 2021-05-15 NOTE — PLAN OF CARE
D: Patient was admitted on 2/8/2021. He has a  history of NICM EF 20% c/b VT s/p CRT-D s/p HMII LVAD 6/19/2017 originally intended as destination therapy 2/2 obesity however with recent weight loss now candidate for transplantation. He is admitted for decompensated heart failure with subsequent s/p OHT 5/6/21.    I: Monitored vitals and assessed pateint status.   Running: R DL PICC line that is heparin locked  PRN: Tramadol 10 mg at 15:18    A: Patient's vital signs have been stable and is 97% on room air. He complained of right hip pain.his rhythm was SR  with a rare PVC    I/O this shift:  In: 1030 [P.O.:1030]  Out: 60 [Chest Tube:60]    Temp:  [97.8  F (36.6  C)-98.6  F (37  C)] 97.8  F (36.6  C)  Pulse:  [] 97  Resp:  [16-18] 18  BP: ()/(57-75) 108/66  SpO2:  [97 %-98 %] 97 %      P: Continue to monitor patient status and report changes to the CVTS treatment team.

## 2021-05-15 NOTE — PROGRESS NOTES
Cardiovascular Surgery Progress Note  05/15/2021         Assessment and Plan:     Mr. Jim Willingham is a 65 year old male with history of NICM EF 20% c/b VT s/p CRT-D s/p HMII LVAD 6/19/2017 originally intended as destination therapy 2/2 obesity however with recent weight loss now candidate for transplantation. He was admitted 2/8/21 for worsening functional status and renal function concerning for worsening heart failure. He was eventually listed status 2E for transplant and received a donor offer.   Jim is now s/p orthotopic heart transplant on 5/6/21 with Dr. Ronnie Quigley. He was shocked > 2x received amio, mag and lidocaine while coming off bypass. Known lung injury with isolated air leak to right pleural.      Cardiovascular:   Hx of NICM EF 20%, VT (ICD)  Hx HeartMate II LVAD 6/19/2017  Cardiorenal syndrome   S/P orthotopic heart transplant   No arrhythmia, HD stable.  Most recent echo showed LV EF 65-70% on 5/9/21.  Basiliximab 5/10.  Terbutaline 2.5 mg TID.    ASA 81 mg, Crestor 10 mg daily.   Diuresis with Bumex 2 mg PO BID, Cards II following.   Chest tubes: Airleak isolated to Right pleural. Had some CP after walking off suction 5/13, needs portable suction while ambulating.   TPW: removed.       Pulmonary:  Hx COPD and CECILIA on CPAP  Air leak from Right pleural tube (injury during surgery)   - PTA Breo ellipta, Incruse ellipta, and Singulair   - Extubated POD 1 to 4 lpm via NC. Now saturating well on RA.   - Air Leak seems to be improving, CXR 5/15 on waterseal   - Pulm toilet, IS, activity and deep breathing     Neurology / MSK:  Hx Depression  - PTA bupropion and amitriptyline  Post-op Status  - Neuro intact  - Acute post-operative pain well controlled with acetaminophen, PO dilaudid PRN, IV dilaudid PRN      / Renal:  WALTER on CKD stage III   - Cardiorenal syndrome improving. Most recent creatinine 1.33, adequate UOP.   - Day before surgery weight 102.3 kg.      GI / FEN:   Hx Sylvester-en-Y gastric  "bypass  Non-severe Mild Protein-Calorie Malnutrition   - Regular diet with supplements, continue bowel regimen  - Replace electrolytes as needed, hepatic enzymes WNL.     Endocrine:  DMT2  Gout   Stress induced hyperglycemia initially managed on insulin drip postop, transitioned to NPH BID and sliding scale. Converted to Lantus 5/14 am, continue prandial and sliding corrective scale.   - PTA allopurinol.      Infectious Disease:  Stress induced leukocytosis  - WBC 17.6, remains afebrile, no signs or symptoms of infection  - Completed perioperative antibiotics     Hematology:   Acute blood loss anemia and thrombocytopenia  Hgb 8.2; Plts WNL, no signs or symptoms of active bleeding     Anticoagulation:   - ASA only     Prophylaxis:   - Stress ulcer prophylaxis: Pantoprazole 40 mg daily for 30 days  - DVT prophylaxis: Subcutaneous heparin, SCD     Disposition:   - Transferred to  on 5/10   - Therapies recommending discharge to Home vs ARU    Discussed with CVTS Fellow as needed.  Staff surgeons have been informed of changes through both verbal and written communication.      Nash Amado PA-C  Cardiothoracic Surgery  Pager [phone]    7:30 AM   May 15, 2021        Interval History:     No overnight events.  WATERSEALED chest tube for CXR this am.   States pain is well managed on current regimen. Slept well overnight.  Tolerating diet, is passing flatus, + BM. No nausea or vomiting.  Breathing well without complaints.   Working with therapies and ambulating in halls without assistance.   Denies chest pain, palpitations, dizziness, syncopal symptoms, fevers, chills, myalgias, or sternal popping/clicking.         Physical Exam:   Blood pressure 114/75, pulse 93, temperature 97.8  F (36.6  C), resp. rate 16, height 1.727 m (5' 8\"), weight 105.8 kg (233 lb 4.8 oz), SpO2 98 %.  Vitals:    05/13/21 0602 05/14/21 0200 05/15/21 0200   Weight: 108.2 kg (238 lb 8 oz) 104 kg (229 lb 3.2 oz) 105.8 kg (233 lb 4.8 oz)    "   Weight; + 3 kg since surgery and trending up x 1 day.   24 hr Fluid status; net loss 645 mL.   MAPs: 74 - 85     Gen: A&Ox4, NAD  Neuro: no focal deficits   CV: sinus tachy, normal S1 S2, no murmurs, rubs or gallops.   Pulm: CTA, no wheezing or rhonchi, normal breathing on RA  Abd: nondistended, normal BS, soft, nontender  Ext: moderate peripheral edema, 2+ pitting  Incision: clean, dry, intact, no erythema, sternum stable  Tubes/drain sites: dressing clean and dry, serosanguinous output, SLOW intermittent air leak. 24 hr output 295 mL.          Data:    Imaging:  reviewed recent imaging, no acute concerns    Labs:  BMP  Recent Labs   Lab 05/15/21  0533 05/14/21  1753 05/14/21  0521 05/13/21  1646   * 130* 132* 131*   POTASSIUM 4.3 4.4 4.5 4.7   CHLORIDE 97 97 98 98   QAMAR 8.0* 7.8* 8.1* 7.9*   CO2 29 29 29 28   BUN 41* 43* 40* 42*   CR 1.44* 1.51* 1.33* 1.22   * 212* 177* 158*     CBC  Recent Labs   Lab 05/15/21  0533 05/14/21  1022 05/14/21 0521 05/13/21  0534 05/12/21  0622   WBC 17.6*  --  13.4* 14.9* 11.1*   RBC 2.68*  --  2.55* 2.81* 2.45*   HGB 8.2* 8.5* 7.6* 8.3* 7.3*   HCT 26.0*  --  24.3* 26.5* 23.3*   MCV 97  --  95 94 95   MCH 30.6  --  29.8 29.5 29.8   MCHC 31.5  --  31.3* 31.3* 31.3*   RDW 15.0  --  15.2* 15.1* 15.3*     --  231 205 148*     INR  Recent Labs   Lab 05/12/21  0622 05/10/21  0330   INR 1.19* 1.33*      Hepatic Panel  Recent Labs   Lab 05/14/21 0521 05/12/21  0622 05/11/21  0542 05/10/21  0330   AST 32 33 28 34   ALT 47 52 49 48   ALKPHOS 70 71 73 78   BILITOTAL 0.6 0.5 0.6 0.6   ALBUMIN 2.5* 2.7* 2.8* 2.8*     GLUCOSE:   Recent Labs   Lab 05/15/21  0654 05/15/21  0533 05/14/21  2053 05/14/21  1753 05/14/21  1606 05/14/21  1141 05/14/21  0734 05/14/21  0521 05/14/21  0020 05/13/21  1646 05/13/21  1646 05/13/21  0534 05/13/21  0534 05/12/21  1736 05/12/21  1736   GLC  --  145*  --  212*  --   --   --  177*  --   --  158*  --  159*  --  185*   *  --  170*  --   117* 97 187*  --  166*   < >  --    < >  --    < >  --     < > = values in this interval not displayed.

## 2021-05-15 NOTE — PROGRESS NOTES
Transplant Social Work Services Progress Note      Date of Initial Social Work Evaluation: 2/10/2021  Collaborated with: Pt and his wife, Cate, at bedside     Data: Pt is s/p LVAD explant and heart transplant on 5/6. Pt extubated 5/7. Pt working with therapies and current rec is ARU.   Pt and wife asked if pt would be able to do a window visit on 2nd floor with his granddaughter as she is having a particularly hard time after pt's transplant. Discussed with CVTS and ok as long as nursing is with pt and nursing has the time to do this.     Intervention: Supportive Visit   Assessment: Pt continuing to cope well post-transplant. He participated in virtual transplant support group yesterday and found it uplifting and comforting. Pt would be appreciative of a window visit with his granddaughter, but understands this can happen if nursing has availability to go go down with him.   Education provided by SW: Ongoing Social Work support, Discharge Planning   Plan:    Discharge Plans in Progress: Anticipate ARU    Barriers to d/c plan: Medical     Follow up Plan: SW to continue to follow

## 2021-05-15 NOTE — PROGRESS NOTES
D:pt to chest tube suction, pt has intermittent leak, drainage coming from underneath the dressing  I:added ABD's and gauze, contacted cross cover  P:[facility]

## 2021-05-15 NOTE — PLAN OF CARE
Pt admitted 2/8 with worsening heart failure, now s/p OHT 5/6. PMH includes NICM s/p HM2 (2017), AFib, RV failure, CKD, DM, CECILIA, HTN, and asthma.     Neuro: A&O x 4. Calls appropriately. Slept well overnight between cares. Denies headache. Afebrile.   Cardiac: SR/ST 90's-100's. 's-110's. Pt denies dizziness, chest pain, or palpitations.   Respiratory: Sating well on RA. RESENDIZ. Rare cough. LS clear. R chest tube to suction.   GI/: Adequate UOP into bedside urinal. Last BM 5/14.  Endocrine: ACHS  Diet/Appetite: Regular  Skin: Sternal incision and old pacemaker sites CRISPIN and WNL. Dressing over incisions from chest tubes that were removed yesterday were reinforced and CDI. Generalized abdominal bruising.  LDA: R DL PICC  Activity: AO1 with walker.  Pain: Incisional pain controlled by PRN Tramadol and PRN dilaudid PO.     Plan: Continue to monitor and alert team with any changes or concerns. Pt to have window visit with granddaughter today or tomorrow.

## 2021-05-16 ENCOUNTER — APPOINTMENT (OUTPATIENT)
Dept: GENERAL RADIOLOGY | Facility: CLINIC | Age: 66
DRG: 001 | End: 2021-05-16
Attending: PHYSICIAN ASSISTANT
Payer: COMMERCIAL

## 2021-05-16 ENCOUNTER — APPOINTMENT (OUTPATIENT)
Dept: OCCUPATIONAL THERAPY | Facility: CLINIC | Age: 66
DRG: 001 | End: 2021-05-16
Attending: INTERNAL MEDICINE
Payer: COMMERCIAL

## 2021-05-16 LAB
ALBUMIN UR-MCNC: 10 MG/DL
ANION GAP SERPL CALCULATED.3IONS-SCNC: 4 MMOL/L (ref 3–14)
ANION GAP SERPL CALCULATED.3IONS-SCNC: 6 MMOL/L (ref 3–14)
APPEARANCE UR: CLEAR
BILIRUB UR QL STRIP: NEGATIVE
BUN SERPL-MCNC: 43 MG/DL (ref 7–30)
BUN SERPL-MCNC: 44 MG/DL (ref 7–30)
CALCIUM SERPL-MCNC: 7.8 MG/DL (ref 8.5–10.1)
CALCIUM SERPL-MCNC: 7.9 MG/DL (ref 8.5–10.1)
CHLORIDE SERPL-SCNC: 96 MMOL/L (ref 94–109)
CHLORIDE SERPL-SCNC: 98 MMOL/L (ref 94–109)
CO2 SERPL-SCNC: 28 MMOL/L (ref 20–32)
CO2 SERPL-SCNC: 29 MMOL/L (ref 20–32)
COLOR UR AUTO: YELLOW
CREAT SERPL-MCNC: 1.75 MG/DL (ref 0.66–1.25)
CREAT SERPL-MCNC: 1.76 MG/DL (ref 0.66–1.25)
CRP SERPL-MCNC: 8.2 MG/L (ref 0–8)
DONOR IDENTIFICATION: NORMAL
DSA COMMENTS: NORMAL
DSA PRESENT: NO
DSA TEST METHOD: NORMAL
ERYTHROCYTE [DISTWIDTH] IN BLOOD BY AUTOMATED COUNT: 15.3 % (ref 10–15)
GFR SERPL CREATININE-BSD FRML MDRD: 40 ML/MIN/{1.73_M2}
GFR SERPL CREATININE-BSD FRML MDRD: 40 ML/MIN/{1.73_M2}
GLUCOSE BLDC GLUCOMTR-MCNC: 132 MG/DL (ref 70–99)
GLUCOSE BLDC GLUCOMTR-MCNC: 140 MG/DL (ref 70–99)
GLUCOSE BLDC GLUCOMTR-MCNC: 154 MG/DL (ref 70–99)
GLUCOSE BLDC GLUCOMTR-MCNC: 194 MG/DL (ref 70–99)
GLUCOSE BLDC GLUCOMTR-MCNC: 208 MG/DL (ref 70–99)
GLUCOSE SERPL-MCNC: 159 MG/DL (ref 70–99)
GLUCOSE SERPL-MCNC: 172 MG/DL (ref 70–99)
GLUCOSE UR STRIP-MCNC: NEGATIVE MG/DL
HCT VFR BLD AUTO: 25.8 % (ref 40–53)
HGB BLD-MCNC: 8.2 G/DL (ref 13.3–17.7)
HGB UR QL STRIP: NEGATIVE
HYALINE CASTS #/AREA URNS LPF: 16 /LPF (ref 0–2)
KETONES UR STRIP-MCNC: NEGATIVE MG/DL
LEUKOCYTE ESTERASE UR QL STRIP: NEGATIVE
MCH RBC QN AUTO: 30.9 PG (ref 26.5–33)
MCHC RBC AUTO-ENTMCNC: 31.8 G/DL (ref 31.5–36.5)
MCV RBC AUTO: 97 FL (ref 78–100)
NITRATE UR QL: NEGATIVE
ORGAN: NORMAL
PH UR STRIP: 5.5 PH (ref 5–7)
PLATELET # BLD AUTO: 293 10E9/L (ref 150–450)
POTASSIUM SERPL-SCNC: 4.1 MMOL/L (ref 3.4–5.3)
POTASSIUM SERPL-SCNC: 4.6 MMOL/L (ref 3.4–5.3)
RBC # BLD AUTO: 2.65 10E12/L (ref 4.4–5.9)
RBC #/AREA URNS AUTO: 0 /HPF (ref 0–2)
SA1 CELL: NORMAL
SA1 COMMENTS: NORMAL
SA1 HI RISK ABY: NORMAL
SA1 MOD RISK ABY: NORMAL
SA1 TEST METHOD: NORMAL
SA2 CELL: NORMAL
SA2 COMMENTS: NORMAL
SA2 HI RISK ABY UA: NORMAL
SA2 MOD RISK ABY: NORMAL
SA2 TEST METHOD: NORMAL
SODIUM SERPL-SCNC: 130 MMOL/L (ref 133–144)
SODIUM SERPL-SCNC: 131 MMOL/L (ref 133–144)
SOURCE: ABNORMAL
SP GR UR STRIP: 1.02 (ref 1–1.03)
SQUAMOUS #/AREA URNS AUTO: 0 /HPF (ref 0–1)
TACROLIMUS BLD-MCNC: 3.4 UG/L (ref 5–15)
TME LAST DOSE: ABNORMAL H
UNACCEPTABLE ANTIGEN: NORMAL
UNOS CPRA: 0
UROBILINOGEN UR STRIP-MCNC: NORMAL MG/DL (ref 0–2)
WBC # BLD AUTO: 17.5 10E9/L (ref 4–11)
WBC #/AREA URNS AUTO: 1 /HPF (ref 0–5)

## 2021-05-16 PROCEDURE — 250N000013 HC RX MED GY IP 250 OP 250 PS 637: Performed by: NURSE PRACTITIONER

## 2021-05-16 PROCEDURE — 80197 ASSAY OF TACROLIMUS: CPT | Performed by: NURSE PRACTITIONER

## 2021-05-16 PROCEDURE — 86140 C-REACTIVE PROTEIN: CPT | Performed by: PHYSICIAN ASSISTANT

## 2021-05-16 PROCEDURE — 250N000012 HC RX MED GY IP 250 OP 636 PS 637: Performed by: SURGERY

## 2021-05-16 PROCEDURE — 99232 SBSQ HOSP IP/OBS MODERATE 35: CPT | Performed by: PHYSICIAN ASSISTANT

## 2021-05-16 PROCEDURE — 250N000013 HC RX MED GY IP 250 OP 250 PS 637: Performed by: STUDENT IN AN ORGANIZED HEALTH CARE EDUCATION/TRAINING PROGRAM

## 2021-05-16 PROCEDURE — 250N000013 HC RX MED GY IP 250 OP 250 PS 637: Performed by: SURGERY

## 2021-05-16 PROCEDURE — 999N001017 HC STATISTIC GLUCOSE BY METER IP

## 2021-05-16 PROCEDURE — 97110 THERAPEUTIC EXERCISES: CPT | Mod: GO

## 2021-05-16 PROCEDURE — 250N000011 HC RX IP 250 OP 636: Performed by: NURSE PRACTITIONER

## 2021-05-16 PROCEDURE — 73502 X-RAY EXAM HIP UNI 2-3 VIEWS: CPT

## 2021-05-16 PROCEDURE — 71046 X-RAY EXAM CHEST 2 VIEWS: CPT | Mod: 26 | Performed by: RADIOLOGY

## 2021-05-16 PROCEDURE — 80048 BASIC METABOLIC PNL TOTAL CA: CPT | Performed by: NURSE PRACTITIONER

## 2021-05-16 PROCEDURE — 81001 URINALYSIS AUTO W/SCOPE: CPT | Performed by: SURGERY

## 2021-05-16 PROCEDURE — 250N000011 HC RX IP 250 OP 636: Performed by: PHYSICIAN ASSISTANT

## 2021-05-16 PROCEDURE — 80048 BASIC METABOLIC PNL TOTAL CA: CPT | Performed by: PHYSICIAN ASSISTANT

## 2021-05-16 PROCEDURE — 250N000012 HC RX MED GY IP 250 OP 636 PS 637: Performed by: NURSE PRACTITIONER

## 2021-05-16 PROCEDURE — 250N000013 HC RX MED GY IP 250 OP 250 PS 637: Performed by: PHYSICIAN ASSISTANT

## 2021-05-16 PROCEDURE — 250N000012 HC RX MED GY IP 250 OP 636 PS 637: Performed by: STUDENT IN AN ORGANIZED HEALTH CARE EDUCATION/TRAINING PROGRAM

## 2021-05-16 PROCEDURE — 36592 COLLECT BLOOD FROM PICC: CPT | Performed by: NURSE PRACTITIONER

## 2021-05-16 PROCEDURE — 73502 X-RAY EXAM HIP UNI 2-3 VIEWS: CPT | Mod: 26 | Performed by: RADIOLOGY

## 2021-05-16 PROCEDURE — 36592 COLLECT BLOOD FROM PICC: CPT | Performed by: PHYSICIAN ASSISTANT

## 2021-05-16 PROCEDURE — 85027 COMPLETE CBC AUTOMATED: CPT | Performed by: PHYSICIAN ASSISTANT

## 2021-05-16 PROCEDURE — 214N000001 HC R&B CCU UMMC

## 2021-05-16 PROCEDURE — 71046 X-RAY EXAM CHEST 2 VIEWS: CPT

## 2021-05-16 PROCEDURE — 999N000044 HC STATISTIC CVC DRESSING CHANGE

## 2021-05-16 RX ORDER — VALGANCICLOVIR 450 MG/1
450 TABLET, FILM COATED ORAL DAILY
Status: DISCONTINUED | OUTPATIENT
Start: 2021-05-17 | End: 2021-05-25

## 2021-05-16 RX ORDER — BUMETANIDE 1 MG/1
1 TABLET ORAL ONCE
Status: COMPLETED | OUTPATIENT
Start: 2021-05-16 | End: 2021-05-16

## 2021-05-16 RX ADMIN — NYSTATIN 1000000 UNITS: 500000 SUSPENSION ORAL at 12:15

## 2021-05-16 RX ADMIN — Medication 5 ML: at 13:32

## 2021-05-16 RX ADMIN — SODIUM CHLORIDE, PRESERVATIVE FREE 5 ML: 5 INJECTION INTRAVENOUS at 13:34

## 2021-05-16 RX ADMIN — MYCOPHENOLATE MOFETIL 1500 MG: 250 CAPSULE ORAL at 07:44

## 2021-05-16 RX ADMIN — PREDNISONE 30 MG: 20 TABLET ORAL at 07:56

## 2021-05-16 RX ADMIN — NYSTATIN 1000000 UNITS: 500000 SUSPENSION ORAL at 07:52

## 2021-05-16 RX ADMIN — GABAPENTIN 600 MG: 300 CAPSULE ORAL at 13:28

## 2021-05-16 RX ADMIN — FLUTICASONE FUROATE AND VILANTEROL TRIFENATATE 1 PUFF: 100; 25 POWDER RESPIRATORY (INHALATION) at 07:51

## 2021-05-16 RX ADMIN — BUMETANIDE 1 MG: 1 TABLET ORAL at 09:56

## 2021-05-16 RX ADMIN — UMECLIDINIUM 1 PUFF: 62.5 AEROSOL, POWDER ORAL at 07:51

## 2021-05-16 RX ADMIN — ACETAMINOPHEN 975 MG: 325 TABLET, FILM COATED ORAL at 05:14

## 2021-05-16 RX ADMIN — NYSTATIN 1000000 UNITS: 500000 SUSPENSION ORAL at 20:55

## 2021-05-16 RX ADMIN — GABAPENTIN 300 MG: 300 CAPSULE ORAL at 07:50

## 2021-05-16 RX ADMIN — ROSUVASTATIN CALCIUM 10 MG: 10 TABLET, FILM COATED ORAL at 07:48

## 2021-05-16 RX ADMIN — ASPIRIN 81 MG CHEWABLE TABLET 81 MG: 81 TABLET CHEWABLE at 07:49

## 2021-05-16 RX ADMIN — MYCOPHENOLATE MOFETIL 1500 MG: 250 CAPSULE ORAL at 20:55

## 2021-05-16 RX ADMIN — PREDNISONE 25 MG: 5 TABLET ORAL at 20:56

## 2021-05-16 RX ADMIN — ACETAMINOPHEN 975 MG: 325 TABLET, FILM COATED ORAL at 21:07

## 2021-05-16 RX ADMIN — TACROLIMUS 2 MG: 1 CAPSULE ORAL at 17:38

## 2021-05-16 RX ADMIN — HYDROMORPHONE HYDROCHLORIDE 4 MG: 2 TABLET ORAL at 21:08

## 2021-05-16 RX ADMIN — ALLOPURINOL 300 MG: 300 TABLET ORAL at 07:49

## 2021-05-16 RX ADMIN — HEPARIN SODIUM 5000 UNITS: 5000 INJECTION, SOLUTION INTRAVENOUS; SUBCUTANEOUS at 05:18

## 2021-05-16 RX ADMIN — HYDROMORPHONE HYDROCHLORIDE 4 MG: 2 TABLET ORAL at 15:02

## 2021-05-16 RX ADMIN — INSULIN ASPART: 100 INJECTION, SOLUTION INTRAVENOUS; SUBCUTANEOUS at 15:22

## 2021-05-16 RX ADMIN — BUMETANIDE 2 MG: 2 TABLET ORAL at 07:49

## 2021-05-16 RX ADMIN — BUMETANIDE 3 MG: 2 TABLET ORAL at 17:38

## 2021-05-16 RX ADMIN — SENNOSIDES AND DOCUSATE SODIUM 1 TABLET: 8.6; 5 TABLET ORAL at 07:48

## 2021-05-16 RX ADMIN — INSULIN ASPART 3 UNITS: 100 INJECTION, SOLUTION INTRAVENOUS; SUBCUTANEOUS at 13:12

## 2021-05-16 RX ADMIN — HEPARIN SODIUM 5000 UNITS: 5000 INJECTION, SOLUTION INTRAVENOUS; SUBCUTANEOUS at 13:10

## 2021-05-16 RX ADMIN — INSULIN ASPART 4 UNITS: 100 INJECTION, SOLUTION INTRAVENOUS; SUBCUTANEOUS at 07:35

## 2021-05-16 RX ADMIN — Medication 1 TABLET: at 07:50

## 2021-05-16 RX ADMIN — NYSTATIN 1000000 UNITS: 500000 SUSPENSION ORAL at 17:38

## 2021-05-16 RX ADMIN — POLYETHYLENE GLYCOL 3350 17 G: 17 POWDER, FOR SOLUTION ORAL at 07:43

## 2021-05-16 RX ADMIN — MONTELUKAST 10 MG: 10 TABLET, FILM COATED ORAL at 21:08

## 2021-05-16 RX ADMIN — Medication 1 TABLET: at 20:56

## 2021-05-16 RX ADMIN — GABAPENTIN 600 MG: 300 CAPSULE ORAL at 21:08

## 2021-05-16 RX ADMIN — Medication 10 MG: at 21:08

## 2021-05-16 RX ADMIN — Medication 50 MG: at 05:14

## 2021-05-16 RX ADMIN — HEPARIN SODIUM 5000 UNITS: 5000 INJECTION, SOLUTION INTRAVENOUS; SUBCUTANEOUS at 21:07

## 2021-05-16 RX ADMIN — BUPROPION HYDROCHLORIDE 100 MG: 100 TABLET, EXTENDED RELEASE ORAL at 07:46

## 2021-05-16 RX ADMIN — TACROLIMUS 2 MG: 1 CAPSULE ORAL at 07:43

## 2021-05-16 RX ADMIN — PANTOPRAZOLE SODIUM 40 MG: 40 TABLET, DELAYED RELEASE ORAL at 07:50

## 2021-05-16 RX ADMIN — Medication 37.5 MG: at 21:13

## 2021-05-16 RX ADMIN — POLYETHYLENE GLYCOL 3350 17 G: 17 POWDER, FOR SOLUTION ORAL at 20:55

## 2021-05-16 RX ADMIN — ACETAMINOPHEN 975 MG: 325 TABLET, FILM COATED ORAL at 13:29

## 2021-05-16 RX ADMIN — INSULIN ASPART 4 UNITS: 100 INJECTION, SOLUTION INTRAVENOUS; SUBCUTANEOUS at 18:03

## 2021-05-16 RX ADMIN — SODIUM CHLORIDE, PRESERVATIVE FREE 5 ML: 5 INJECTION INTRAVENOUS at 17:29

## 2021-05-16 RX ADMIN — DAPSONE 50 MG: 25 TABLET ORAL at 07:47

## 2021-05-16 RX ADMIN — VALGANCICLOVIR 900 MG: 450 TABLET, FILM COATED ORAL at 07:46

## 2021-05-16 RX ADMIN — BUPROPION HYDROCHLORIDE 100 MG: 100 TABLET, EXTENDED RELEASE ORAL at 21:07

## 2021-05-16 ASSESSMENT — ACTIVITIES OF DAILY LIVING (ADL)
ADLS_ACUITY_SCORE: 14

## 2021-05-16 ASSESSMENT — MIFFLIN-ST. JEOR: SCORE: 1836.81

## 2021-05-16 NOTE — PROGRESS NOTES
[facility]   Cardiology II Service / Advanced Heart Failure  Daily Progress Note        Patient: Jim Willingham  MRN: [id number]  Admission Date: 2/8/2021  Hospital Day # 97    Assessment and Plan: Jim Willingham is a 65 year old male with history of NICM EF 20% c/b VT s/p CRT-D s/p HMII LVAD 6/19/2017 originally intended as destination therapy 2/2 obesity however with recent weight loss now candidate for transplantation. He is admitted for decompensated heart failure with subsequent s/p OHT 5/6/21.     Recommendations today:   - Continue to monitor off abx at this point  - Check C. Diff if he develops diarrhea  - Agree with hip X-ray  - Increase Bumex to 3 mg po BID, maintain euvolemic state.   - Continue Tac 2 mg po BID, continue daily levels    S/p OHT. 5/6/21 secondary to NICM. Echo 5/13/21 conistent with EF 65-70%, mildly decreased RV function, and dilated IVC. RHC 5/13/21 with mRA-5, RV-30/5, mPA-20, mPCWP-10, MICHELLE CO-5.8, and MICHELLE CI-2.63, and SVR-914.   Immunosuppression: Simulect induction 5/6 and 5/10. Tac increased to 2 mg po BID, level 3.4. Goal 10-12. Prednisone taper. Cellcept 1500 mg po BID.   Serostatus: CMV: D?, R+, EBV: R+, Toxo R-  Prophylaxis: Dapsone, Nystatin, and Valcyte (3-6 months pending donor status).   - Increased bumex to 3 mg PO BID  - Continue Crestor and ASA.   - DSA 5/13 pending  - 5/13 biopsy pending    Leukocytosis. No consolidation on CXR. No symptoms. Has been afebrile. U/A bland. CRP is low although note his significant immunsupression  - Procalcidone intermideiate  - Blood culture NGTD  - Would Check C. Diff if diarrhea reoccurs  - Continue trending WBC  - WBC-17.5 (F17.6)    Right Apical Pneumothorax.   - Management per CVTS.   ================================================================  Interval History/ROS:  He does have some hip pain that is worse in the last 4 days. Trouble swinging his leg up into bed today. He denies fever, chills, chest pain,  "palpitations, cough, diarrhea. LE edema slightly worse than yesterday. No abdominal pain, nausea or vomiting. He is ambulating well with therapy except that it is causing hip pain today.    Last 24 hr care team notes reviewed.   ROS:  4 point ROS including Respiratory, CV, GI and , other than that noted in the HPI, is negative.     Medications: Reviewed in EPIC.     Physical Exam:   /68 (BP Location: Left arm)   Pulse 93   Temp 98.7  F (37.1  C) (Oral)   Resp 18   Ht 1.727 m (5' 8\")   Wt 106.7 kg (235 lb 4.8 oz)   SpO2 97%   BMI 35.78 kg/m    GENERAL: Appears alert and interacting appropriately.   HEENT: Eye symmetrical and free of discharge bilaterally. Mucous membranes moist and without lesions.  NECK: Supple and without lymphadenopathy. JVD below clavicular line.    CV: RRR, S1S2 present without murmur, rub, and gallop.   RESPIRATORY: Respirations regular, even, and unlabored. Lungs CTA throughout.   GI: Soft and non distended with normoactive bowel sounds present in all quadrants. No tenderness, rebound, guarding. No organomegaly.   EXTREMITIES: 1+ bilateral LE peripheral edema. 2+ bilateral pedal pulses.   NEUROLOGIC: Alert and interacting appropriatly. No focal deficits.   MUSCULOSKELETAL: No joint swelling or tenderness.   SKIN: No jaundice. No rashes or lesions.     Data:  CMP  Recent Labs   Lab 05/16/21  0555 05/15/21  2001 05/15/21  0533 05/14/21  1753 05/14/21  0521 05/12/21  0622 05/12/21  0622 05/11/21  0542 05/11/21  0542 05/10/21  0330   * 132* 131* 130* 132*   < > 132*   < > 134 134   POTASSIUM 4.1 4.1 4.3 4.4 4.5   < > 4.7   < > 4.6 3.8   CHLORIDE 98 98 97 97 98   < > 100   < > 101 102   CO2 29 28 29 29 29   < > 27   < > 27 26   ANIONGAP 4 6 4 5 5   < > 4   < > 6 7   * 167* 145* 212* 177*   < > 180*   < > 158* 188*   BUN 43* 42* 41* 43* 40*   < > 54*   < > 72* 90*   CR 1.76* 1.81* 1.44* 1.51* 1.33*   < > 1.40*   < > 1.65* 1.96*   GFRESTIMATED 40* 39* 51* 48* 56*   < > 53* "   < > 43* 35*   GFRESTBLACK 46* 45* 59* 56* 65   < > 61   < > 50* 41*   QAMAR 7.8* 7.8* 8.0* 7.8* 8.1*   < > 7.8*   < > 7.6* 8.0*   MAG  --   --   --   --  1.9  --  2.3  --   --  2.7*   PROTTOTAL  --   --   --   --  4.7*  --  4.9*  --  5.0* 5.0*   ALBUMIN  --   --   --   --  2.5*  --  2.7*  --  2.8* 2.8*   BILITOTAL  --   --   --   --  0.6  --  0.5  --  0.6 0.6   ALKPHOS  --   --   --   --  70  --  71  --  73 78   AST  --   --   --   --  32  --  33  --  28 34   ALT  --   --   --   --  47  --  52  --  49 48    < > = values in this interval not displayed.     CBC  Recent Labs   Lab 05/16/21  0555 05/15/21  0533 05/14/21  1022 05/14/21  0521 05/13/21  0534   WBC 17.5* 17.6*  --  13.4* 14.9*   RBC 2.65* 2.68*  --  2.55* 2.81*   HGB 8.2* 8.2* 8.5* 7.6* 8.3*   HCT 25.8* 26.0*  --  24.3* 26.5*   MCV 97 97  --  95 94   MCH 30.9 30.6  --  29.8 29.5   MCHC 31.8 31.5  --  31.3* 31.3*   RDW 15.3* 15.0  --  15.2* 15.1*    284  --  231 205     INR  Recent Labs   Lab 05/12/21  0622 05/10/21  0330   INR 1.19* 1.33*       Patient discussed with Dr. Seth.      Didi Butler, PA-C  [facility]

## 2021-05-16 NOTE — PLAN OF CARE
D:pt had my Dad as a teacher  A:pt reflected on his life Journey  I:listined and validated pt's Journey  P:continue to provide emotional support

## 2021-05-16 NOTE — PLAN OF CARE
Pt admitted 2/8 with worsening heart failure, now s/p OHT 5/6. PMH includes NICM s/p HM2 (2017), AFib, RV failure, CKD, DM, CECILIA, HTN, and asthma.    Neuro: A&O x 4. Calls appropriately. Slept well overnight.   Cardiac: SR 90's. 's to 110's. Denies dizziness, chest pain, or palpitations.  Respiratory: Sating well on RA. RESENDIZ. LS clear. R chest tube to suction.   GI/: Adequate UOP. Last BM 5/15. Denies nausea.  Endocrine: ACHS BG checks  Diet/Appetite: Regular  Skin: Left chest incision CRISPIN and WNL. Sternal incision CRISPIN and WNL. Chest tube dressing CDI.   LDA: R DL PICC  Activity: AO1 with walker.  Pain: Incisional pain controlled by PRN dilaudid and scheduled Tylenol. Pt also complaining of mild right hip pain from lying in bed.      Plan: Continue to monitor and alert team with any changes or concerns.

## 2021-05-16 NOTE — PROGRESS NOTES
Cardiovascular Surgery Progress Note  05/16/2021         Assessment and Plan:     Mr. Jim Willingham is a 65 year old male with history of NICM EF 20% c/b VT s/p CRT-D s/p HMII LVAD 6/19/2017 originally intended as destination therapy 2/2 obesity however with recent weight loss now candidate for transplantation. He was admitted 2/8/21 for worsening functional status and renal function concerning for worsening heart failure. He was eventually listed status 2E for transplant and received a donor offer.   Jim is now s/p orthotopic heart transplant on 5/6/21 with Dr. Ronnie Quigley. He was shocked > 2x received amio, mag and lidocaine while coming off bypass. Known lung injury with isolated air leak to right pleural.      Cardiovascular:   Hx of NICM EF 20%, VT (ICD)  Hx HeartMate II LVAD 6/19/2017  Cardiorenal syndrome   S/P orthotopic heart transplant   No arrhythmia, HD stable.  Most recent echo showed LV EF 65-70% on 5/9/21.  Basiliximab 5/10.  Terbutaline 2.5 mg TID.    ASA 81 mg, Crestor 10 mg daily.   Diuresis with Bumex 3 mg PO BID, Cards II following.   Chest tubes: Airleak isolated to Right pleural. Had some CP after walking off suction 5/13, needs portable suction while ambulating.   TPW: removed.       Pulmonary:  Hx COPD and CECILIA on CPAP  Air leak from Right pleural tube (injury during surgery)   - PTA Breo ellipta, Incruse ellipta, and Singulair   - Extubated POD 1 to 4 lpm via NC. Now saturating well on RA.   - Air Leak seems to be improving, CXR 5/15 on waterseal   - Pulm toilet, IS, activity and deep breathing     Neurology / MSK:  Hx Depression  - PTA bupropion and amitriptyline  Post-op Status  - Neuro intact  - Acute post-operative pain well controlled with acetaminophen, PO dilaudid PRN, IV dilaudid PRN  R Hip Pain   - Complaining of R hip pain increasing over past 2 days, keeping him from raising legs into bed independently. Pain with flexion and abduction. Will get Xray 5/16 and try Ice.      GRACE  / Renal:  WALTER on CKD stage III   - Cardiorenal syndrome improving. Most recent creatinine 1.76, adequate UOP.   - Day before surgery weight 102.3 kg.      GI / FEN:   Hx Sylvester-en-Y gastric bypass  Non-severe Mild Protein-Calorie Malnutrition   - Regular diet with supplements, continue bowel regimen  - Replace electrolytes as needed, hepatic enzymes WNL.     Endocrine:  DMT2  Gout   Stress induced hyperglycemia initially managed on insulin drip postop, transitioned to NPH BID and sliding scale. Converted to Lantus 5/14 am, continue prandial and sliding corrective scale.   - PTA allopurinol.      Infectious Disease:  Stress induced leukocytosis  - WBC 17.5, remains afebrile, no signs or symptoms of infection. UA clean 5/15.   - Completed perioperative antibiotics.      Hematology:   Acute blood loss anemia and thrombocytopenia  Hgb 8.2; Plts WNL, no signs or symptoms of active bleeding     Anticoagulation:   - ASA only     Prophylaxis:   - Stress ulcer prophylaxis: Pantoprazole 40 mg daily for 30 days  - DVT prophylaxis: Subcutaneous heparin, SCD     Disposition:   - Transferred to  on 5/10   - Therapies recommending discharge to Home vs ARU    Discussed with CVTS Fellow as needed.  Staff surgeons have been informed of changes through both verbal and written communication.      Nash Amado PA-C  Cardiothoracic Surgery  Pager [phone]    7:51 AM   May 16, 2021        Interval History:     No overnight events. Complaining of R hip pain increasing over past 2 days, keeping him from raising legs into bed independently. Pain with flexion and abduction.   States pain is well managed on current regimen. Slept well overnight.  Tolerating diet, is passing flatus, + BM. No nausea or vomiting.  Breathing well without complaints.   Working with therapies and ambulating in halls with assistance, slowed by R hip pain.   Denies chest pain, palpitations, dizziness, syncopal symptoms, fevers, chills, myalgias, or sternal  "popping/clicking.         Physical Exam:   Blood pressure 100/65, pulse 91, temperature 97.7  F (36.5  C), temperature source Oral, resp. rate 18, height 1.727 m (5' 8\"), weight 106.7 kg (235 lb 4.8 oz), SpO2 98 %.  Vitals:    05/14/21 0200 05/15/21 0200 05/16/21 0500   Weight: 104 kg (229 lb 3.2 oz) 105.8 kg (233 lb 4.8 oz) 106.7 kg (235 lb 4.8 oz)      Weight; + 4 kg since surgery and trending up x 3 days.   24 hr Fluid status; net gain 280 mL.   MAPs: 62 - 75     Gen: A&Ox4, NAD  Neuro: no focal deficits   CV: RRR, normal S1 S2, no murmurs, rubs or gallops.   Pulm: CTA, no wheezing or rhonchi, normal breathing on RA  Abd: nondistended, normal BS, soft, nontender  Ext: moderate peripheral edema, 2+ pitting  Incision: clean, dry, intact, no erythema, sternum stable  Tubes/drain sites: dressing clean and dry, serosanguinous output, slow intermittent air leak. 24 hr output 220 mL.          Data:    Imaging:  reviewed recent imaging, no acute concerns    Labs:  BMP  Recent Labs   Lab 05/16/21  0555 05/15/21  2001 05/15/21  0533 05/14/21  1753   * 132* 131* 130*   POTASSIUM 4.1 4.1 4.3 4.4   CHLORIDE 98 98 97 97   QAMAR 7.8* 7.8* 8.0* 7.8*   CO2 29 28 29 29   BUN 43* 42* 41* 43*   CR 1.76* 1.81* 1.44* 1.51*   * 167* 145* 212*     CBC  Recent Labs   Lab 05/16/21  0555 05/15/21  0533 05/14/21  1022 05/14/21 0521 05/13/21  0534   WBC 17.5* 17.6*  --  13.4* 14.9*   RBC 2.65* 2.68*  --  2.55* 2.81*   HGB 8.2* 8.2* 8.5* 7.6* 8.3*   HCT 25.8* 26.0*  --  24.3* 26.5*   MCV 97 97  --  95 94   MCH 30.9 30.6  --  29.8 29.5   MCHC 31.8 31.5  --  31.3* 31.3*   RDW 15.3* 15.0  --  15.2* 15.1*    284  --  231 205     INR  Recent Labs   Lab 05/12/21  0622 05/10/21  0330   INR 1.19* 1.33*      Hepatic Panel  Recent Labs   Lab 05/14/21  0521 05/12/21  0622 05/11/21  0542 05/10/21  0330   AST 32 33 28 34   ALT 47 52 49 48   ALKPHOS 70 71 73 78   BILITOTAL 0.6 0.5 0.6 0.6   ALBUMIN 2.5* 2.7* 2.8* 2.8*     GLUCOSE: "   Recent Labs   Lab 05/16/21  0711 05/16/21  0555 05/15/21  2001 05/15/21  1812 05/15/21  1210 05/15/21  0654 05/15/21  0533 05/14/21  2053 05/14/21  1753 05/14/21  1606 05/14/21  0521 05/14/21  0521 05/13/21  1646 05/13/21  1646   GLC  --  159* 167*  --   --   --  145*  --  212*  --   --  177*  --  158*   *  --   --  136* 100* 148*  --  170*  --  117*   < >  --    < >  --     < > = values in this interval not displayed.

## 2021-05-16 NOTE — PLAN OF CARE
D: Patient was admitted on 2/8/2021. He has a  history of NICM EF 20% c/b VT s/p CRT-D s/p HMII LVAD 6/19/2017 originally intended as destination therapy 2/2 obesity however with recent weight loss now candidate for transplantation. He is admitted for decompensated heart failure with subsequent s/p OHT 5/6/21    I: Monitored vitals and assessed patient status.   Running:R DL PICC is Heparin locked    A: Patient's vital signs have been stable and is 97-98% on room air. He complained of right hip pain slightly but did not want any pain medications. He did have a CXR and a right hip xray. See results for details. He has not voided since early this morning when they sent his UA/UC    I/O this shift:  In: 820 [P.O.:820]  Out: 80 [Chest Tube:80]    Temp:  [96.7  F (35.9  C)-98.7  F (37.1  C)] 98.7  F (37.1  C)  Pulse:  [] 93  Resp:  [18-20] 18  BP: (100-127)/(51-68) 107/68  SpO2:  [97 %-100 %] 97 %      P: Continue to monitor patient status and report changes to the CVTS treatment team.

## 2021-05-17 ENCOUNTER — APPOINTMENT (OUTPATIENT)
Dept: PHYSICAL THERAPY | Facility: CLINIC | Age: 66
DRG: 001 | End: 2021-05-17
Attending: INTERNAL MEDICINE
Payer: COMMERCIAL

## 2021-05-17 ENCOUNTER — APPOINTMENT (OUTPATIENT)
Dept: GENERAL RADIOLOGY | Facility: CLINIC | Age: 66
DRG: 001 | End: 2021-05-17
Attending: PHYSICIAN ASSISTANT
Payer: COMMERCIAL

## 2021-05-17 ENCOUNTER — APPOINTMENT (OUTPATIENT)
Dept: OCCUPATIONAL THERAPY | Facility: CLINIC | Age: 66
DRG: 001 | End: 2021-05-17
Attending: INTERNAL MEDICINE
Payer: COMMERCIAL

## 2021-05-17 LAB
ALBUMIN SERPL-MCNC: 2.5 G/DL (ref 3.4–5)
ALP SERPL-CCNC: 80 U/L (ref 40–150)
ALT SERPL W P-5'-P-CCNC: 44 U/L (ref 0–70)
ANION GAP SERPL CALCULATED.3IONS-SCNC: 5 MMOL/L (ref 3–14)
AST SERPL W P-5'-P-CCNC: 24 U/L (ref 0–45)
BASOPHILS # BLD AUTO: 0 10E9/L (ref 0–0.2)
BASOPHILS NFR BLD AUTO: 0.1 %
BILIRUB DIRECT SERPL-MCNC: 0.1 MG/DL (ref 0–0.2)
BILIRUB SERPL-MCNC: 0.4 MG/DL (ref 0.2–1.3)
BUN SERPL-MCNC: 48 MG/DL (ref 7–30)
CALCIUM SERPL-MCNC: 7.6 MG/DL (ref 8.5–10.1)
CHLORIDE SERPL-SCNC: 97 MMOL/L (ref 94–109)
CO2 SERPL-SCNC: 29 MMOL/L (ref 20–32)
COPATH REPORT: NORMAL
CREAT SERPL-MCNC: 1.78 MG/DL (ref 0.66–1.25)
CRP SERPL-MCNC: 6.9 MG/L (ref 0–8)
DIFFERENTIAL METHOD BLD: ABNORMAL
EOSINOPHIL # BLD AUTO: 0 10E9/L (ref 0–0.7)
EOSINOPHIL NFR BLD AUTO: 0 %
ERYTHROCYTE [DISTWIDTH] IN BLOOD BY AUTOMATED COUNT: 15.3 % (ref 10–15)
GFR SERPL CREATININE-BSD FRML MDRD: 39 ML/MIN/{1.73_M2}
GLUCOSE BLDC GLUCOMTR-MCNC: 137 MG/DL (ref 70–99)
GLUCOSE BLDC GLUCOMTR-MCNC: 176 MG/DL (ref 70–99)
GLUCOSE BLDC GLUCOMTR-MCNC: 218 MG/DL (ref 70–99)
GLUCOSE BLDC GLUCOMTR-MCNC: 229 MG/DL (ref 70–99)
GLUCOSE SERPL-MCNC: 194 MG/DL (ref 70–99)
HCT VFR BLD AUTO: 24.5 % (ref 40–53)
HGB BLD-MCNC: 7.8 G/DL (ref 13.3–17.7)
IMM GRANULOCYTES # BLD: 0.3 10E9/L (ref 0–0.4)
IMM GRANULOCYTES NFR BLD: 1.4 %
LYMPHOCYTES # BLD AUTO: 0.6 10E9/L (ref 0.8–5.3)
LYMPHOCYTES NFR BLD AUTO: 3.3 %
MAGNESIUM SERPL-MCNC: 2.1 MG/DL (ref 1.6–2.3)
MCH RBC QN AUTO: 31.2 PG (ref 26.5–33)
MCHC RBC AUTO-ENTMCNC: 31.8 G/DL (ref 31.5–36.5)
MCV RBC AUTO: 98 FL (ref 78–100)
MONOCYTES # BLD AUTO: 0.6 10E9/L (ref 0–1.3)
MONOCYTES NFR BLD AUTO: 3.4 %
NEUTROPHILS # BLD AUTO: 17.2 10E9/L (ref 1.6–8.3)
NEUTROPHILS NFR BLD AUTO: 91.8 %
NRBC # BLD AUTO: 0 10*3/UL
NRBC BLD AUTO-RTO: 0 /100
PLATELET # BLD AUTO: 292 10E9/L (ref 150–450)
POTASSIUM SERPL-SCNC: 4.4 MMOL/L (ref 3.4–5.3)
PROCALCITONIN SERPL-MCNC: 0.19 NG/ML
PROT SERPL-MCNC: 4.9 G/DL (ref 6.8–8.8)
RBC # BLD AUTO: 2.5 10E12/L (ref 4.4–5.9)
SODIUM SERPL-SCNC: 131 MMOL/L (ref 133–144)
SPECIMEN SOURCE FLD: NORMAL
TACROLIMUS BLD-MCNC: 3.6 UG/L (ref 5–15)
TME LAST DOSE: ABNORMAL H
TRIGL FLD-MCNC: 15 MG/DL
WBC # BLD AUTO: 18.7 10E9/L (ref 4–11)

## 2021-05-17 PROCEDURE — 250N000012 HC RX MED GY IP 250 OP 636 PS 637: Performed by: SURGERY

## 2021-05-17 PROCEDURE — 85004 AUTOMATED DIFF WBC COUNT: CPT | Performed by: PHYSICIAN ASSISTANT

## 2021-05-17 PROCEDURE — 97530 THERAPEUTIC ACTIVITIES: CPT | Mod: GP | Performed by: PHYSICAL THERAPIST

## 2021-05-17 PROCEDURE — 250N000012 HC RX MED GY IP 250 OP 636 PS 637: Performed by: STUDENT IN AN ORGANIZED HEALTH CARE EDUCATION/TRAINING PROGRAM

## 2021-05-17 PROCEDURE — 250N000012 HC RX MED GY IP 250 OP 636 PS 637: Performed by: NURSE PRACTITIONER

## 2021-05-17 PROCEDURE — 250N000012 HC RX MED GY IP 250 OP 636 PS 637: Performed by: PHYSICIAN ASSISTANT

## 2021-05-17 PROCEDURE — 80076 HEPATIC FUNCTION PANEL: CPT | Performed by: PHYSICIAN ASSISTANT

## 2021-05-17 PROCEDURE — 80197 ASSAY OF TACROLIMUS: CPT | Performed by: NURSE PRACTITIONER

## 2021-05-17 PROCEDURE — 250N000013 HC RX MED GY IP 250 OP 250 PS 637: Performed by: STUDENT IN AN ORGANIZED HEALTH CARE EDUCATION/TRAINING PROGRAM

## 2021-05-17 PROCEDURE — 97110 THERAPEUTIC EXERCISES: CPT | Mod: GO

## 2021-05-17 PROCEDURE — 250N000013 HC RX MED GY IP 250 OP 250 PS 637: Performed by: SURGERY

## 2021-05-17 PROCEDURE — 84145 PROCALCITONIN (PCT): CPT | Performed by: PHYSICIAN ASSISTANT

## 2021-05-17 PROCEDURE — 250N000013 HC RX MED GY IP 250 OP 250 PS 637: Performed by: NURSE PRACTITIONER

## 2021-05-17 PROCEDURE — 83735 ASSAY OF MAGNESIUM: CPT | Performed by: PHYSICIAN ASSISTANT

## 2021-05-17 PROCEDURE — 250N000011 HC RX IP 250 OP 636: Performed by: PHYSICIAN ASSISTANT

## 2021-05-17 PROCEDURE — 214N000001 HC R&B CCU UMMC

## 2021-05-17 PROCEDURE — 71046 X-RAY EXAM CHEST 2 VIEWS: CPT

## 2021-05-17 PROCEDURE — 86140 C-REACTIVE PROTEIN: CPT | Performed by: PHYSICIAN ASSISTANT

## 2021-05-17 PROCEDURE — 250N000011 HC RX IP 250 OP 636: Performed by: NURSE PRACTITIONER

## 2021-05-17 PROCEDURE — 80048 BASIC METABOLIC PNL TOTAL CA: CPT | Performed by: PHYSICIAN ASSISTANT

## 2021-05-17 PROCEDURE — 999N001017 HC STATISTIC GLUCOSE BY METER IP

## 2021-05-17 PROCEDURE — 99232 SBSQ HOSP IP/OBS MODERATE 35: CPT | Performed by: PHYSICIAN ASSISTANT

## 2021-05-17 PROCEDURE — 74018 RADEX ABDOMEN 1 VIEW: CPT | Mod: 26 | Performed by: RADIOLOGY

## 2021-05-17 PROCEDURE — 85027 COMPLETE CBC AUTOMATED: CPT | Performed by: PHYSICIAN ASSISTANT

## 2021-05-17 PROCEDURE — 80076 HEPATIC FUNCTION PANEL: CPT | Performed by: NURSE PRACTITIONER

## 2021-05-17 PROCEDURE — 36415 COLL VENOUS BLD VENIPUNCTURE: CPT | Performed by: PHYSICIAN ASSISTANT

## 2021-05-17 PROCEDURE — 36592 COLLECT BLOOD FROM PICC: CPT | Performed by: PHYSICIAN ASSISTANT

## 2021-05-17 PROCEDURE — 71046 X-RAY EXAM CHEST 2 VIEWS: CPT | Mod: 26 | Performed by: RADIOLOGY

## 2021-05-17 PROCEDURE — 74018 RADEX ABDOMEN 1 VIEW: CPT

## 2021-05-17 PROCEDURE — 87040 BLOOD CULTURE FOR BACTERIA: CPT | Performed by: PHYSICIAN ASSISTANT

## 2021-05-17 PROCEDURE — 97116 GAIT TRAINING THERAPY: CPT | Mod: GP | Performed by: PHYSICAL THERAPIST

## 2021-05-17 PROCEDURE — 85048 AUTOMATED LEUKOCYTE COUNT: CPT | Performed by: PHYSICIAN ASSISTANT

## 2021-05-17 PROCEDURE — 250N000013 HC RX MED GY IP 250 OP 250 PS 637: Performed by: PHYSICIAN ASSISTANT

## 2021-05-17 PROCEDURE — 84478 ASSAY OF TRIGLYCERIDES: CPT | Performed by: PHYSICIAN ASSISTANT

## 2021-05-17 RX ORDER — TACROLIMUS 1 MG/1
3 CAPSULE ORAL
Status: DISCONTINUED | OUTPATIENT
Start: 2021-05-17 | End: 2021-05-21

## 2021-05-17 RX ORDER — BUMETANIDE 0.25 MG/ML
3 INJECTION INTRAMUSCULAR; INTRAVENOUS 2 TIMES DAILY
Status: DISCONTINUED | OUTPATIENT
Start: 2021-05-17 | End: 2021-05-18

## 2021-05-17 RX ADMIN — SENNOSIDES AND DOCUSATE SODIUM 1 TABLET: 8.6; 5 TABLET ORAL at 07:54

## 2021-05-17 RX ADMIN — TACROLIMUS 2 MG: 1 CAPSULE ORAL at 07:56

## 2021-05-17 RX ADMIN — HYDROMORPHONE HYDROCHLORIDE 4 MG: 2 TABLET ORAL at 05:12

## 2021-05-17 RX ADMIN — Medication 10 MG: at 22:23

## 2021-05-17 RX ADMIN — Medication 50 MG: at 23:26

## 2021-05-17 RX ADMIN — ACETAMINOPHEN 975 MG: 325 TABLET, FILM COATED ORAL at 22:23

## 2021-05-17 RX ADMIN — HEPARIN SODIUM 5000 UNITS: 5000 INJECTION, SOLUTION INTRAVENOUS; SUBCUTANEOUS at 20:45

## 2021-05-17 RX ADMIN — PREDNISONE 25 MG: 5 TABLET ORAL at 20:35

## 2021-05-17 RX ADMIN — BUMETANIDE 3 MG: 2 TABLET ORAL at 07:59

## 2021-05-17 RX ADMIN — NYSTATIN 1000000 UNITS: 500000 SUSPENSION ORAL at 20:34

## 2021-05-17 RX ADMIN — SODIUM CHLORIDE, PRESERVATIVE FREE 5 ML: 5 INJECTION INTRAVENOUS at 08:07

## 2021-05-17 RX ADMIN — ALBUTEROL SULFATE 2 PUFF: 90 INHALANT RESPIRATORY (INHALATION) at 05:32

## 2021-05-17 RX ADMIN — BUPROPION HYDROCHLORIDE 100 MG: 100 TABLET, EXTENDED RELEASE ORAL at 07:58

## 2021-05-17 RX ADMIN — TACROLIMUS 3 MG: 1 CAPSULE ORAL at 17:38

## 2021-05-17 RX ADMIN — MYCOPHENOLATE MOFETIL 1500 MG: 250 CAPSULE ORAL at 07:55

## 2021-05-17 RX ADMIN — SODIUM CHLORIDE, PRESERVATIVE FREE 5 ML: 5 INJECTION INTRAVENOUS at 05:22

## 2021-05-17 RX ADMIN — Medication 5 ML: at 14:57

## 2021-05-17 RX ADMIN — POLYETHYLENE GLYCOL 3350 17 G: 17 POWDER, FOR SOLUTION ORAL at 07:54

## 2021-05-17 RX ADMIN — ROSUVASTATIN CALCIUM 10 MG: 10 TABLET, FILM COATED ORAL at 08:03

## 2021-05-17 RX ADMIN — HYDROMORPHONE HYDROCHLORIDE 4 MG: 2 TABLET ORAL at 22:24

## 2021-05-17 RX ADMIN — INSULIN ASPART 9 UNITS: 100 INJECTION, SOLUTION INTRAVENOUS; SUBCUTANEOUS at 07:48

## 2021-05-17 RX ADMIN — DAPSONE 50 MG: 25 TABLET ORAL at 07:58

## 2021-05-17 RX ADMIN — Medication 1 TABLET: at 08:00

## 2021-05-17 RX ADMIN — HEPARIN SODIUM 5000 UNITS: 5000 INJECTION, SOLUTION INTRAVENOUS; SUBCUTANEOUS at 05:16

## 2021-05-17 RX ADMIN — ACETAMINOPHEN 975 MG: 325 TABLET, FILM COATED ORAL at 05:16

## 2021-05-17 RX ADMIN — ASPIRIN 81 MG CHEWABLE TABLET 81 MG: 81 TABLET CHEWABLE at 08:00

## 2021-05-17 RX ADMIN — Medication 37.5 MG: at 22:24

## 2021-05-17 RX ADMIN — VALGANCICLOVIR 450 MG: 450 TABLET, FILM COATED ORAL at 07:57

## 2021-05-17 RX ADMIN — NYSTATIN 1000000 UNITS: 500000 SUSPENSION ORAL at 17:38

## 2021-05-17 RX ADMIN — LIDOCAINE 2 PATCH: 560 PATCH PERCUTANEOUS; TOPICAL; TRANSDERMAL at 08:10

## 2021-05-17 RX ADMIN — MYCOPHENOLATE MOFETIL 1500 MG: 250 CAPSULE ORAL at 20:35

## 2021-05-17 RX ADMIN — ALLOPURINOL 300 MG: 300 TABLET ORAL at 08:01

## 2021-05-17 RX ADMIN — NYSTATIN 1000000 UNITS: 500000 SUSPENSION ORAL at 12:26

## 2021-05-17 RX ADMIN — PANTOPRAZOLE SODIUM 40 MG: 40 TABLET, DELAYED RELEASE ORAL at 08:01

## 2021-05-17 RX ADMIN — Medication 50 MG: at 12:30

## 2021-05-17 RX ADMIN — GABAPENTIN 300 MG: 300 CAPSULE ORAL at 07:58

## 2021-05-17 RX ADMIN — MONTELUKAST 10 MG: 10 TABLET, FILM COATED ORAL at 22:24

## 2021-05-17 RX ADMIN — HEPARIN SODIUM 5000 UNITS: 5000 INJECTION, SOLUTION INTRAVENOUS; SUBCUTANEOUS at 12:26

## 2021-05-17 RX ADMIN — BUMETANIDE 3 MG: 0.25 INJECTION INTRAMUSCULAR; INTRAVENOUS at 14:47

## 2021-05-17 RX ADMIN — SODIUM CHLORIDE, PRESERVATIVE FREE 5 ML: 5 INJECTION INTRAVENOUS at 15:00

## 2021-05-17 RX ADMIN — PREDNISONE 25 MG: 5 TABLET ORAL at 08:01

## 2021-05-17 RX ADMIN — NYSTATIN 1000000 UNITS: 500000 SUSPENSION ORAL at 08:03

## 2021-05-17 RX ADMIN — GABAPENTIN 600 MG: 300 CAPSULE ORAL at 22:23

## 2021-05-17 RX ADMIN — POLYETHYLENE GLYCOL 3350 17 G: 17 POWDER, FOR SOLUTION ORAL at 20:34

## 2021-05-17 RX ADMIN — Medication 1 TABLET: at 20:35

## 2021-05-17 RX ADMIN — FLUTICASONE FUROATE AND VILANTEROL TRIFENATATE 1 PUFF: 100; 25 POWDER RESPIRATORY (INHALATION) at 07:51

## 2021-05-17 RX ADMIN — ACETAMINOPHEN 325 MG: 325 TABLET, FILM COATED ORAL at 14:45

## 2021-05-17 RX ADMIN — BUPROPION HYDROCHLORIDE 100 MG: 100 TABLET, EXTENDED RELEASE ORAL at 20:34

## 2021-05-17 RX ADMIN — ACETAMINOPHEN 650 MG: 325 TABLET, FILM COATED ORAL at 12:26

## 2021-05-17 RX ADMIN — GABAPENTIN 600 MG: 300 CAPSULE ORAL at 14:46

## 2021-05-17 RX ADMIN — UMECLIDINIUM 1 PUFF: 62.5 AEROSOL, POWDER ORAL at 07:51

## 2021-05-17 RX ADMIN — SENNOSIDES AND DOCUSATE SODIUM 1 TABLET: 8.6; 5 TABLET ORAL at 20:34

## 2021-05-17 ASSESSMENT — ACTIVITIES OF DAILY LIVING (ADL)
ADLS_ACUITY_SCORE: 14

## 2021-05-17 ASSESSMENT — MIFFLIN-ST. JEOR: SCORE: 1853.6

## 2021-05-17 NOTE — PROGRESS NOTES
[facility]   Cardiology II Service / Advanced Heart Failure  Daily Progress Note        Patient: Jim Willingham  MRN: [id number]  Admission Date: 2/8/2021  Hospital Day # 98    Assessment and Plan: Jim Willingham is a 65 year old male with history of NICM EF 20% c/b VT s/p CRT-D s/p HMII LVAD 6/19/2017 originally intended as destination therapy 2/2 obesity however with recent weight loss now candidate for transplantation. He is admitted for decompensated heart failure with subsequent s/p OHT 5/6/21.     Recommendations today:   - Continue to monitor off abx at this point  - Check C. Diff if he develops diarrhea  - Increase Bumex to 3 mg IV BID, aiming for 1L net negative  dialy  - Continue Tac 2 mg po BID, continue daily levels    S/p OHT. 5/6/21 secondary to NICM. Echo 5/13/21 conistent with EF 65-70%, mildly decreased RV function, and dilated IVC. RHC 5/13/21 with mRA-5, RV-30/5, mPA-20, mPCWP-10, MICHELLE CO-5.8, and MICHELLE CI-2.63, and SVR-914.   Immunosuppression: Simulect induction 5/6 and 5/10. Tac increased to 2 mg po BID, level 3.6. Goal 10-12. Prednisone taper. Cellcept 1500 mg po BID.   Serostatus: CMV: D?, R+, EBV: R+, Toxo R-  Prophylaxis: Dapsone, Nystatin, and Valcyte (3-6 months pending donor status).   - Increased bumex to 3 mg IV BID  - Continue Crestor and ASA.   - DSA 5/13 pending  - 5/13 NER    Leukocytosis. No consolidation on CXR. No symptoms. Has been afebrile. U/A bland. CRP is low although note his significant immunsupression  - Procalcitone intermideiate  - Blood culture NGTD  - Would Check C. Diff if diarrhea reoccurs  - Continue trending WBC  - WBC-18.7 (F17.5)    Right Apical Pneumothorax.   - Management per CVTS.   ================================================================  Interval History/ROS:  He denies fever, chills, chest pain, palpitations, cough, diarrhea. Hip pain is slightly better. LE edema slightly worse than yesterday. No abdominal pain, nausea or vomiting.  "He is ambulating well with therapy except that it is causing hip pain today.    Last 24 hr care team notes reviewed.   ROS:  4 point ROS including Respiratory, CV, GI and , other than that noted in the HPI, is negative.     Medications: Reviewed in EPIC.     Physical Exam:   BP 99/67 (BP Location: Left arm)   Pulse 98   Temp 98.4  F (36.9  C) (Oral)   Resp 18   Ht 1.727 m (5' 8\")   Wt 108.4 kg (239 lb)   SpO2 93%   BMI 36.34 kg/m    GENERAL: Appears alert and interacting appropriately.   HEENT: Eye symmetrical and free of discharge bilaterally. Mucous membranes moist and without lesions.  NECK: Supple and without lymphadenopathy. JVD below clavicular line.    CV: RRR, S1S2 present without murmur, rub, and gallop.   RESPIRATORY: Respirations regular, even, and unlabored. Lungs CTA throughout.   GI: Soft and non distended with normoactive bowel sounds present in all quadrants. No tenderness, rebound, guarding. No organomegaly.   EXTREMITIES: 1+ bilateral LE peripheral edema. 2+ bilateral pedal pulses.   NEUROLOGIC: Alert and interacting appropriatly. No focal deficits.   MUSCULOSKELETAL: No joint swelling or tenderness.   SKIN: No jaundice. No rashes or lesions.     Data:  CMP  Recent Labs   Lab 05/17/21  0530 05/16/21  1735 05/16/21  0555 05/15/21  2001 05/14/21  0521 05/14/21  0521 05/12/21  0622 05/12/21  0622 05/11/21  0542 05/11/21  0542   * 130* 131* 132*   < > 132*   < > 132*   < > 134   POTASSIUM 4.4 4.6 4.1 4.1   < > 4.5   < > 4.7   < > 4.6   CHLORIDE 97 96 98 98   < > 98   < > 100   < > 101   CO2 29 28 29 28   < > 29   < > 27   < > 27   ANIONGAP 5 6 4 6   < > 5   < > 4   < > 6   * 172* 159* 167*   < > 177*   < > 180*   < > 158*   BUN 48* 44* 43* 42*   < > 40*   < > 54*   < > 72*   CR 1.78* 1.75* 1.76* 1.81*   < > 1.33*   < > 1.40*   < > 1.65*   GFRESTIMATED 39* 40* 40* 39*   < > 56*   < > 53*   < > 43*   GFRESTBLACK 46* 47* 46* 45*   < > 65   < > 61   < > 50*   QAMAR 7.6* 7.9* 7.8* 7.8*   " < > 8.1*   < > 7.8*   < > 7.6*   MAG 2.1  --   --   --   --  1.9  --  2.3  --   --    PROTTOTAL 4.9*  --   --   --   --  4.7*  --  4.9*  --  5.0*   ALBUMIN 2.5*  --   --   --   --  2.5*  --  2.7*  --  2.8*   BILITOTAL 0.4  --   --   --   --  0.6  --  0.5  --  0.6   ALKPHOS 80  --   --   --   --  70  --  71  --  73   AST 24  --   --   --   --  32  --  33  --  28   ALT 44  --   --   --   --  47  --  52  --  49    < > = values in this interval not displayed.     CBC  Recent Labs   Lab 05/17/21  0530 05/16/21  0555 05/15/21  0533 05/14/21  1022 05/14/21  0521   WBC 18.7* 17.5* 17.6*  --  13.4*   RBC 2.50* 2.65* 2.68*  --  2.55*   HGB 7.8* 8.2* 8.2* 8.5* 7.6*   HCT 24.5* 25.8* 26.0*  --  24.3*   MCV 98 97 97  --  95   MCH 31.2 30.9 30.6  --  29.8   MCHC 31.8 31.8 31.5  --  31.3*   RDW 15.3* 15.3* 15.0  --  15.2*    293 284  --  231     INR  Recent Labs   Lab 05/12/21  0622   INR 1.19*       Patient discussed with Dr. Seth.      Didi Butler, PA-C  [facility]

## 2021-05-17 NOTE — PLAN OF CARE
Pt admitted 2/8 with worsening heart failure, now s/p OHT 5/6. PMH includes NICM s/p HM2 (2017), AFib, RV failure, CKD, DM, CECILIA, HTN, and asthma.    Neuro: A&O x 4. Calls appropriately. Pleasant affect. Very sleepy overnight. Afebrile. Denies headache.   Cardiac: SR 90's. SBP 90's to 100's. Denies dizziness chest pain, or palpitations.  Respiratory: Sating well on RA. RESENDIZ. LS clear. R chest tube to continuous suction. Pt to be on portable suction when walking. Increased output overnight. Canister changed.   GI/: Voiding adequately into bedside urinal. Last BM 5/16. Denies nausea.   Endocrine: ACHS BG checks  Diet/Appetite: Regular diet.  Skin: Sternal incision CRISPIN and WNL. Chest tube dressing CDI. Old chest tube sites with dressings CDI. Generalized abdominal bruising.  LDA: R DL PICC SL.  Activity: SBA  Pain: Incisional and R hip pain well controlled by PRN dilaudid given 1x and scheduled Tylenol.      Plan: Continue to monitor and alert team with any changes or concerns.

## 2021-05-17 NOTE — PROGRESS NOTES
Cardiovascular Surgery Progress Note  05/17/2021         Assessment and Plan:     Mr. Jim Willingham is a 65 year old male with history of NICM EF 20% c/b VT s/p CRT-D s/p HMII LVAD 6/19/2017 originally intended as destination therapy 2/2 obesity however with recent weight loss now candidate for transplantation. He was admitted 2/8/21 for worsening functional status and renal function concerning for worsening heart failure. He was eventually listed status 2E for transplant and received a donor offer.   Jim is now s/p orthotopic heart transplant on 5/6/21 with Dr. Ronnie Quigley. He was shocked > 2x received amio, mag and lidocaine while coming off bypass. Known lung injury with isolated air leak to right pleural.      Cardiovascular:   Hx of NICM EF 20%, VT (ICD)  Hx HeartMate II LVAD 6/19/2017  Cardiorenal syndrome   S/P orthotopic heart transplant   No arrhythmia, HD stable.  Most recent echo showed LV EF 65-70% on 5/9/21.  Basiliximab 5/10.  Terbutaline 2.5 mg TID.    ASA 81 mg, Crestor 10 mg daily.   Diuresis with Bumex 3 mg PO BID, Cards II following.   Chest tubes: Airleak isolated to Right pleural. Had some CP after walking off suction 5/13, needs portable suction while ambulating.   TPW: removed.       Pulmonary:  Hx COPD and CECILIA on CPAP  Air leak from Right pleural tube (unintended injury during surgery)   - PTA Breo ellipta, Incruse ellipta, and Singulair   - Extubated POD 1 to 4 lpm via NC. Now saturating well on RA.   - Air Leak seems to be improving, CXR 5/15 on waterseal   - Pulm toilet, IS, activity and deep breathing  - R pleural fluid Triglycerides 15 on 5/17 for increased drainage overnight.      Neurology / MSK:  Hx Depression  - PTA bupropion and amitriptyline  Post-op Status  - Neuro intact  - Acute post-operative pain well controlled with acetaminophen, PO dilaudid PRN, IV dilaudid PRN  R Hip Pain   - Complaining of R hip pain increasing over past 2 days, keeping him from raising legs into  bed independently. Pain with flexion and abduction. Hip Xray 5/16 without acute concerns, try Ice.       / Renal:  WALTER on CKD stage III   - Cardiorenal syndrome improving. Most recent creatinine 1.78, adequate UOP.   - Day before surgery weight 102.3 kg.      GI / FEN:   Hx Sylvester-en-Y gastric bypass  Non-severe Mild Protein-Calorie Malnutrition   - Regular diet with supplements, continue bowel regimen  - Replace electrolytes as needed, hepatic enzymes WNL.     Endocrine:  DMT2  Gout   Stress induced hyperglycemia initially managed on insulin drip postop, transitioned to NPH BID and sliding scale. Converted to Lantus 5/14 am, continue prandial and sliding corrective scale.   - PTA allopurinol.      Infectious Disease:  Stress induced leukocytosis  - WBC 18.7, remains afebrile, no signs or symptoms of infection. UA clean 5/15.   - Completed perioperative antibiotics.      Hematology:   Acute blood loss anemia and thrombocytopenia  Hgb 7.8, Plts WNL, no signs or symptoms of active bleeding     Anticoagulation:   - ASA only     Prophylaxis:   - Stress ulcer prophylaxis: Pantoprazole 40 mg daily for 30 days  - DVT prophylaxis: Subcutaneous heparin, SCD     Disposition:   - Transferred to  on 5/10   - Therapies recommending discharge to Home vs ARU    Discussed with CVTS Fellow as needed.  Staff surgeons have been informed of changes through both verbal and written communication.      Nash Amado PA-C  Cardiothoracic Surgery  Pager [phone]    7:17 AM   May 17, 2021        Interval History:     No overnight events. Continues to Feel good overall. R hip pain about the same  States pain is well managed on current regimen. Slept well overnight.  Tolerating diet, is passing flatus, + BM. No nausea or vomiting.  Breathing well without complaints.   Working with therapies and ambulating in halls without assistance.   Denies chest pain, palpitations, dizziness, syncopal symptoms, fevers, chills, myalgias, or sternal  "popping/clicking.         Physical Exam:   Blood pressure 95/54, pulse 96, temperature 97.8  F (36.6  C), temperature source Oral, resp. rate 18, height 1.727 m (5' 8\"), weight 106.7 kg (235 lb 4.8 oz), SpO2 98 %.  Vitals:    05/14/21 0200 05/15/21 0200 05/16/21 0500   Weight: 104 kg (229 lb 3.2 oz) 105.8 kg (233 lb 4.8 oz) 106.7 kg (235 lb 4.8 oz)      Weight; about stable since last week but trending up.   24 hr Fluid status; net loss 720 mL.   MAPs: 66 - 79     Gen: A&Ox4, NAD  Neuro: no focal deficits   CV: RRR, normal S1 S2, no murmurs, rubs or gallops.   Pulm: CTA, no wheezing or rhonchi, normal breathing on RA  Abd: nondistended, normal BS, soft, nontender  Incision: clean, dry, intact, no erythema, sternum stable  Tubes/drain sites: dressing clean and dry, 210 mL out yesterday, 300 mL overnight. Slow intermittent air leak.          Data:    Imaging:  reviewed recent imaging, no acute concerns    Labs:  BMP  Recent Labs   Lab 05/17/21  0530 05/16/21  1735 05/16/21  0555 05/15/21  2001   * 130* 131* 132*   POTASSIUM 4.4 4.6 4.1 4.1   CHLORIDE 97 96 98 98   QAMAR 7.6* 7.9* 7.8* 7.8*   CO2 29 28 29 28   BUN 48* 44* 43* 42*   CR 1.78* 1.75* 1.76* 1.81*   * 172* 159* 167*     CBC  Recent Labs   Lab 05/17/21  0530 05/16/21  0555 05/15/21  0533 05/14/21  1022 05/14/21  0521   WBC 18.7* 17.5* 17.6*  --  13.4*   RBC 2.50* 2.65* 2.68*  --  2.55*   HGB 7.8* 8.2* 8.2* 8.5* 7.6*   HCT 24.5* 25.8* 26.0*  --  24.3*   MCV 98 97 97  --  95   MCH 31.2 30.9 30.6  --  29.8   MCHC 31.8 31.8 31.5  --  31.3*   RDW 15.3* 15.3* 15.0  --  15.2*    293 284  --  231     INR  Recent Labs   Lab 05/12/21  0622   INR 1.19*      Hepatic Panel  Recent Labs   Lab 05/17/21  0530 05/14/21  0521 05/12/21  0622 05/11/21  0542   AST 24 32 33 28   ALT 44 47 52 49   ALKPHOS 80 70 71 73   BILITOTAL 0.4 0.6 0.5 0.6   ALBUMIN 2.5* 2.5* 2.7* 2.8*     GLUCOSE:   Recent Labs   Lab 05/17/21  0530 05/16/21 2117 05/16/21  1802 " 05/16/21  1735 05/16/21  1256 05/16/21  1211 05/16/21  0711 05/16/21  0555 05/15/21  2001 05/15/21  1812 05/15/21  0533 05/15/21  0533 05/14/21  1753 05/14/21  1753   *  --   --  172*  --   --   --  159* 167*  --   --  145*  --  212*   BGM  --  208* 194*  --  140* 132* 154*  --   --  136*   < >  --    < >  --     < > = values in this interval not displayed.

## 2021-05-17 NOTE — PLAN OF CARE
D:pt express pain in hip, x-ray done  A:pt stated it has gotten worse the last three days  I:encourage movement and repositioning, prn pain meds  P:[facility] Primary

## 2021-05-17 NOTE — PLAN OF CARE
Patient:   LEONOR HAN            MRN: [id number]            FIN: [id number]              D: Patient was admitted on 2/8/2021. He has a  history of NICM EF 20% c/b VT s/p CRT-D s/p HMII LVAD 6/19/2017 originally intended as destination therapy 2/2 obesity however with recent weight loss now candidate for transplantation. He is admitted for decompensated heart failure with subsequent s/p OHT 5/6/21    I: Monitored vitals and assessed patient status.   Running: R PICC is a heparin locked  PRN: He had Tramadol 50 mg at 12:30 with tylenol 650 mg that is why he only received 1 tylenol before 15:00    A: Patient's vital signs have been stable, His rhythm was SR/ST. He complauned of right hip pain and received Tramadol and tylenol with relief     I/O this shift:  In: 680 [P.O.:680]  Out: 355 [Urine:300; Chest Tube:55]    Temp:  [97.4  F (36.3  C)-98.6  F (37  C)] 98.4  F (36.9  C)  Pulse:  [71-98] 98  Resp:  [18] 18  BP: ()/(54-69) 99/67  SpO2:  [93 %-98 %] 93 %      P: Continue to monitor patient status and report changes to the CVTS treatment team.        Gagnon  Physician Name: jameese sykes, Dr. Lenon faxed hpi  Sarah Butterfield MD, [facility] Hospitalist  [phone]

## 2021-05-18 ENCOUNTER — APPOINTMENT (OUTPATIENT)
Dept: OCCUPATIONAL THERAPY | Facility: CLINIC | Age: 66
DRG: 001 | End: 2021-05-18
Attending: INTERNAL MEDICINE
Payer: COMMERCIAL

## 2021-05-18 ENCOUNTER — APPOINTMENT (OUTPATIENT)
Dept: CT IMAGING | Facility: CLINIC | Age: 66
DRG: 001 | End: 2021-05-18
Attending: PHYSICIAN ASSISTANT
Payer: COMMERCIAL

## 2021-05-18 ENCOUNTER — APPOINTMENT (OUTPATIENT)
Dept: PHYSICAL THERAPY | Facility: CLINIC | Age: 66
DRG: 001 | End: 2021-05-18
Attending: INTERNAL MEDICINE
Payer: COMMERCIAL

## 2021-05-18 LAB
ANION GAP SERPL CALCULATED.3IONS-SCNC: 6 MMOL/L (ref 3–14)
ANION GAP SERPL CALCULATED.3IONS-SCNC: 6 MMOL/L (ref 3–14)
BUN SERPL-MCNC: 53 MG/DL (ref 7–30)
BUN SERPL-MCNC: 55 MG/DL (ref 7–30)
CALCIUM SERPL-MCNC: 7.6 MG/DL (ref 8.5–10.1)
CALCIUM SERPL-MCNC: 7.8 MG/DL (ref 8.5–10.1)
CHLORIDE SERPL-SCNC: 94 MMOL/L (ref 94–109)
CHLORIDE SERPL-SCNC: 97 MMOL/L (ref 94–109)
CO2 SERPL-SCNC: 28 MMOL/L (ref 20–32)
CO2 SERPL-SCNC: 28 MMOL/L (ref 20–32)
CREAT SERPL-MCNC: 1.74 MG/DL (ref 0.66–1.25)
CREAT SERPL-MCNC: 1.91 MG/DL (ref 0.66–1.25)
CRP SERPL-MCNC: 6 MG/L (ref 0–8)
ERYTHROCYTE [DISTWIDTH] IN BLOOD BY AUTOMATED COUNT: 15.7 % (ref 10–15)
GFR SERPL CREATININE-BSD FRML MDRD: 36 ML/MIN/{1.73_M2}
GFR SERPL CREATININE-BSD FRML MDRD: 41 ML/MIN/{1.73_M2}
GLUCOSE BLDC GLUCOMTR-MCNC: 190 MG/DL (ref 70–99)
GLUCOSE BLDC GLUCOMTR-MCNC: 193 MG/DL (ref 70–99)
GLUCOSE BLDC GLUCOMTR-MCNC: 304 MG/DL (ref 70–99)
GLUCOSE BLDC GLUCOMTR-MCNC: 446 MG/DL (ref 70–99)
GLUCOSE SERPL-MCNC: 158 MG/DL (ref 70–99)
GLUCOSE SERPL-MCNC: 180 MG/DL (ref 70–99)
HCT VFR BLD AUTO: 25.7 % (ref 40–53)
HGB BLD-MCNC: 8.3 G/DL (ref 13.3–17.7)
MCH RBC QN AUTO: 30.9 PG (ref 26.5–33)
MCHC RBC AUTO-ENTMCNC: 32.3 G/DL (ref 31.5–36.5)
MCV RBC AUTO: 96 FL (ref 78–100)
PLATELET # BLD AUTO: 318 10E9/L (ref 150–450)
POTASSIUM SERPL-SCNC: 4.3 MMOL/L (ref 3.4–5.3)
POTASSIUM SERPL-SCNC: 4.4 MMOL/L (ref 3.4–5.3)
RBC # BLD AUTO: 2.69 10E12/L (ref 4.4–5.9)
SODIUM SERPL-SCNC: 128 MMOL/L (ref 133–144)
SODIUM SERPL-SCNC: 131 MMOL/L (ref 133–144)
TACROLIMUS BLD-MCNC: 3.6 UG/L (ref 5–15)
TME LAST DOSE: ABNORMAL H
WBC # BLD AUTO: 17.3 10E9/L (ref 4–11)

## 2021-05-18 PROCEDURE — 80048 BASIC METABOLIC PNL TOTAL CA: CPT | Performed by: PHYSICIAN ASSISTANT

## 2021-05-18 PROCEDURE — 250N000012 HC RX MED GY IP 250 OP 636 PS 637: Performed by: PHYSICIAN ASSISTANT

## 2021-05-18 PROCEDURE — 250N000012 HC RX MED GY IP 250 OP 636 PS 637: Performed by: STUDENT IN AN ORGANIZED HEALTH CARE EDUCATION/TRAINING PROGRAM

## 2021-05-18 PROCEDURE — 250N000013 HC RX MED GY IP 250 OP 250 PS 637: Performed by: SURGERY

## 2021-05-18 PROCEDURE — 250N000012 HC RX MED GY IP 250 OP 636 PS 637: Performed by: SURGERY

## 2021-05-18 PROCEDURE — 97530 THERAPEUTIC ACTIVITIES: CPT | Mod: GO

## 2021-05-18 PROCEDURE — 36415 COLL VENOUS BLD VENIPUNCTURE: CPT | Performed by: PHYSICIAN ASSISTANT

## 2021-05-18 PROCEDURE — 250N000013 HC RX MED GY IP 250 OP 250 PS 637: Performed by: STUDENT IN AN ORGANIZED HEALTH CARE EDUCATION/TRAINING PROGRAM

## 2021-05-18 PROCEDURE — 999N001017 HC STATISTIC GLUCOSE BY METER IP

## 2021-05-18 PROCEDURE — 250N000011 HC RX IP 250 OP 636: Performed by: NURSE PRACTITIONER

## 2021-05-18 PROCEDURE — 71250 CT THORAX DX C-: CPT | Mod: 26 | Performed by: RADIOLOGY

## 2021-05-18 PROCEDURE — 97140 MANUAL THERAPY 1/> REGIONS: CPT | Mod: GP

## 2021-05-18 PROCEDURE — 71250 CT THORAX DX C-: CPT

## 2021-05-18 PROCEDURE — 97116 GAIT TRAINING THERAPY: CPT | Mod: GP

## 2021-05-18 PROCEDURE — 250N000013 HC RX MED GY IP 250 OP 250 PS 637: Performed by: NURSE PRACTITIONER

## 2021-05-18 PROCEDURE — 250N000011 HC RX IP 250 OP 636: Performed by: PHYSICIAN ASSISTANT

## 2021-05-18 PROCEDURE — 250N000013 HC RX MED GY IP 250 OP 250 PS 637: Performed by: PHYSICIAN ASSISTANT

## 2021-05-18 PROCEDURE — 85027 COMPLETE CBC AUTOMATED: CPT | Performed by: PHYSICIAN ASSISTANT

## 2021-05-18 PROCEDURE — 99232 SBSQ HOSP IP/OBS MODERATE 35: CPT | Performed by: PHYSICIAN ASSISTANT

## 2021-05-18 PROCEDURE — 97530 THERAPEUTIC ACTIVITIES: CPT | Mod: GP

## 2021-05-18 PROCEDURE — 80197 ASSAY OF TACROLIMUS: CPT | Performed by: NURSE PRACTITIONER

## 2021-05-18 PROCEDURE — 214N000001 HC R&B CCU UMMC

## 2021-05-18 PROCEDURE — 74176 CT ABD & PELVIS W/O CONTRAST: CPT | Mod: 26 | Performed by: RADIOLOGY

## 2021-05-18 PROCEDURE — 86140 C-REACTIVE PROTEIN: CPT | Performed by: PHYSICIAN ASSISTANT

## 2021-05-18 PROCEDURE — 36592 COLLECT BLOOD FROM PICC: CPT | Performed by: PHYSICIAN ASSISTANT

## 2021-05-18 RX ORDER — LACTULOSE 10 G/15ML
20 SOLUTION ORAL ONCE
Status: COMPLETED | OUTPATIENT
Start: 2021-05-18 | End: 2021-05-18

## 2021-05-18 RX ORDER — BUMETANIDE 0.25 MG/ML
4 INJECTION INTRAMUSCULAR; INTRAVENOUS 2 TIMES DAILY
Status: DISCONTINUED | OUTPATIENT
Start: 2021-05-18 | End: 2021-05-20

## 2021-05-18 RX ADMIN — Medication 37.5 MG: at 22:06

## 2021-05-18 RX ADMIN — Medication 50 MG: at 22:05

## 2021-05-18 RX ADMIN — LACTULOSE 20 G: 20 SOLUTION ORAL at 12:11

## 2021-05-18 RX ADMIN — DAPSONE 50 MG: 25 TABLET ORAL at 07:42

## 2021-05-18 RX ADMIN — BUMETANIDE 4 MG: 0.25 INJECTION INTRAMUSCULAR; INTRAVENOUS at 14:22

## 2021-05-18 RX ADMIN — HEPARIN SODIUM 5000 UNITS: 5000 INJECTION, SOLUTION INTRAVENOUS; SUBCUTANEOUS at 13:31

## 2021-05-18 RX ADMIN — TACROLIMUS 3 MG: 1 CAPSULE ORAL at 17:32

## 2021-05-18 RX ADMIN — GABAPENTIN 300 MG: 300 CAPSULE ORAL at 07:42

## 2021-05-18 RX ADMIN — NYSTATIN 1000000 UNITS: 500000 SUSPENSION ORAL at 07:44

## 2021-05-18 RX ADMIN — MYCOPHENOLATE MOFETIL 1500 MG: 250 CAPSULE ORAL at 20:41

## 2021-05-18 RX ADMIN — ASPIRIN 81 MG CHEWABLE TABLET 81 MG: 81 TABLET CHEWABLE at 07:43

## 2021-05-18 RX ADMIN — GABAPENTIN 600 MG: 300 CAPSULE ORAL at 13:31

## 2021-05-18 RX ADMIN — POLYETHYLENE GLYCOL 3350 17 G: 17 POWDER, FOR SOLUTION ORAL at 20:41

## 2021-05-18 RX ADMIN — ACETAMINOPHEN 975 MG: 325 TABLET, FILM COATED ORAL at 05:36

## 2021-05-18 RX ADMIN — Medication 10 MG: at 22:06

## 2021-05-18 RX ADMIN — HYDROMORPHONE HYDROCHLORIDE 4 MG: 2 TABLET ORAL at 14:22

## 2021-05-18 RX ADMIN — PREDNISONE 20 MG: 20 TABLET ORAL at 20:42

## 2021-05-18 RX ADMIN — NYSTATIN 1000000 UNITS: 500000 SUSPENSION ORAL at 17:32

## 2021-05-18 RX ADMIN — NYSTATIN 1000000 UNITS: 500000 SUSPENSION ORAL at 12:11

## 2021-05-18 RX ADMIN — BUPROPION HYDROCHLORIDE 100 MG: 100 TABLET, EXTENDED RELEASE ORAL at 20:42

## 2021-05-18 RX ADMIN — BUPROPION HYDROCHLORIDE 100 MG: 100 TABLET, EXTENDED RELEASE ORAL at 07:41

## 2021-05-18 RX ADMIN — ALLOPURINOL 300 MG: 300 TABLET ORAL at 07:44

## 2021-05-18 RX ADMIN — Medication 5 ML: at 14:23

## 2021-05-18 RX ADMIN — INSULIN ASPART 5 UNITS: 100 INJECTION, SOLUTION INTRAVENOUS; SUBCUTANEOUS at 08:35

## 2021-05-18 RX ADMIN — SENNOSIDES AND DOCUSATE SODIUM 1 TABLET: 8.6; 5 TABLET ORAL at 20:42

## 2021-05-18 RX ADMIN — DOCUSATE SODIUM 50 MG AND SENNOSIDES 8.6 MG 2 TABLET: 8.6; 5 TABLET, FILM COATED ORAL at 08:07

## 2021-05-18 RX ADMIN — ROSUVASTATIN CALCIUM 10 MG: 10 TABLET, FILM COATED ORAL at 07:43

## 2021-05-18 RX ADMIN — INSULIN ASPART 6 UNITS: 100 INJECTION, SOLUTION INTRAVENOUS; SUBCUTANEOUS at 12:51

## 2021-05-18 RX ADMIN — PANTOPRAZOLE SODIUM 40 MG: 40 TABLET, DELAYED RELEASE ORAL at 07:42

## 2021-05-18 RX ADMIN — ACETAMINOPHEN 975 MG: 325 TABLET, FILM COATED ORAL at 22:05

## 2021-05-18 RX ADMIN — NYSTATIN 1000000 UNITS: 500000 SUSPENSION ORAL at 20:42

## 2021-05-18 RX ADMIN — Medication 1 TABLET: at 20:42

## 2021-05-18 RX ADMIN — ALBUTEROL SULFATE 2 PUFF: 90 INHALANT RESPIRATORY (INHALATION) at 10:20

## 2021-05-18 RX ADMIN — VALGANCICLOVIR 450 MG: 450 TABLET, FILM COATED ORAL at 07:43

## 2021-05-18 RX ADMIN — BUMETANIDE 3 MG: 0.25 INJECTION INTRAMUSCULAR; INTRAVENOUS at 07:46

## 2021-05-18 RX ADMIN — UMECLIDINIUM 1 PUFF: 62.5 AEROSOL, POWDER ORAL at 07:45

## 2021-05-18 RX ADMIN — TACROLIMUS 3 MG: 1 CAPSULE ORAL at 07:40

## 2021-05-18 RX ADMIN — HYDROMORPHONE HYDROCHLORIDE 4 MG: 2 TABLET ORAL at 10:06

## 2021-05-18 RX ADMIN — GABAPENTIN 600 MG: 300 CAPSULE ORAL at 22:06

## 2021-05-18 RX ADMIN — POLYETHYLENE GLYCOL 3350 17 G: 17 POWDER, FOR SOLUTION ORAL at 07:45

## 2021-05-18 RX ADMIN — SENNOSIDES AND DOCUSATE SODIUM 1 TABLET: 8.6; 5 TABLET ORAL at 08:07

## 2021-05-18 RX ADMIN — HYDROMORPHONE HYDROCHLORIDE 4 MG: 2 TABLET ORAL at 20:42

## 2021-05-18 RX ADMIN — HEPARIN SODIUM 5000 UNITS: 5000 INJECTION, SOLUTION INTRAVENOUS; SUBCUTANEOUS at 05:36

## 2021-05-18 RX ADMIN — PREDNISONE 25 MG: 5 TABLET ORAL at 07:41

## 2021-05-18 RX ADMIN — HEPARIN SODIUM 5000 UNITS: 5000 INJECTION, SOLUTION INTRAVENOUS; SUBCUTANEOUS at 20:42

## 2021-05-18 RX ADMIN — Medication 1 TABLET: at 07:44

## 2021-05-18 RX ADMIN — MONTELUKAST 10 MG: 10 TABLET, FILM COATED ORAL at 22:06

## 2021-05-18 RX ADMIN — FLUTICASONE FUROATE AND VILANTEROL TRIFENATATE 1 PUFF: 100; 25 POWDER RESPIRATORY (INHALATION) at 07:45

## 2021-05-18 RX ADMIN — MYCOPHENOLATE MOFETIL 1500 MG: 250 CAPSULE ORAL at 07:40

## 2021-05-18 RX ADMIN — ACETAMINOPHEN 975 MG: 325 TABLET, FILM COATED ORAL at 14:22

## 2021-05-18 ASSESSMENT — ACTIVITIES OF DAILY LIVING (ADL)
ADLS_ACUITY_SCORE: 14

## 2021-05-18 ASSESSMENT — MIFFLIN-ST. JEOR: SCORE: 1858.13

## 2021-05-18 NOTE — PROGRESS NOTES
Cardiovascular Surgery Progress Note  05/18/2021         Assessment and Plan:     Mr. Jim Willingham is a 65 year old male with history of NICM EF 20% c/b VT s/p CRT-D s/p HMII LVAD 6/19/2017 originally intended as destination therapy 2/2 obesity however with recent weight loss now candidate for transplantation. He was admitted 2/8/21 for worsening functional status and renal function concerning for worsening heart failure. He was eventually listed status 2E for transplant and received a donor offer.   Jim is now s/p orthotopic heart transplant on 5/6/21 with Dr. Ronnie Quigley. He was shocked > 2x received amio, mag and lidocaine while coming off bypass. Known lung injury with isolated air leak to right pleural.      Cardiovascular:   Hx of NICM EF 20%, VT (ICD)  Hx HeartMate II LVAD 6/19/2017  Cardiorenal syndrome   S/P orthotopic heart transplant   No arrhythmia, HD stable.  Most recent echo showed LV EF 65-70% on 5/9/21.  Basiliximab 5/10.  Terbutaline 2.5 mg TID.    ASA 81 mg, Crestor 10 mg daily.   Diuresis with Bumex 3 mg PO BID, Cards II following.   Chest tubes: Airleak isolated to Right pleural. Had some CP after walking off suction 5/13, needs portable suction while ambulating.   TPW: removed.   - Lymphedema wraps 5/18       Pulmonary:  Hx COPD and CECILIA on CPAP  Air leak from Right pleural tube (unintended injury during surgery)   - PTA Breo ellipta, Incruse ellipta, and Singulair   - Extubated POD 1 to 4 lpm via NC. Now saturating well on RA.   - Air Leak seems to be improving, CXR 5/15 on waterseal   - Pulm toilet, IS, activity and deep breathing  - R pleural fluid Triglycerides normal on 5/17 (first day of increased drainage)     Neurology / MSK:  Hx Depression  - PTA bupropion and amitriptyline  Post-op Status  - Neuro intact  - Acute post-operative pain well controlled with acetaminophen, PO dilaudid PRN, IV dilaudid PRN  R Hip Pain   - Complaining of R hip pain increasing over past 2 days, keeping  him from raising legs into bed independently. Pain with flexion and abduction. Hip Xray 5/16 without acute concerns, try Ice.       / Renal:  WALTER on CKD stage III   - Cardiorenal syndrome improving. Most recent creatinine 1.74, adequate UOP.   - Day before surgery weight 102.3 kg.      GI / FEN:   Hx Sylvester-en-Y gastric bypass  Non-severe Mild Protein-Calorie Malnutrition   - Regular diet with supplements, continue bowel regimen  - Replace electrolytes as needed, hepatic enzymes WNL.  - Needs to have a BM 5/18     Endocrine:  DMT2  Gout   Stress induced hyperglycemia initially managed on insulin drip postop, transitioned to NPH BID and sliding scale. Converted to Lantus 5/14 am, continue prandial and sliding corrective scale.   - PTA allopurinol.      Infectious Disease:  Stress induced leukocytosis  - WBC 17.3, remains afebrile, no signs or symptoms of infection. UA clean 5/15.   - Completed perioperative antibiotics.   - Persistent elevated WBC, CT Ch/ Ad/ Pv 5/18.      Hematology:   Acute blood loss anemia and thrombocytopenia  Hgb 8.3, Plts WNL, no signs or symptoms of active bleeding     Anticoagulation:   - ASA only     Prophylaxis:   - Stress ulcer prophylaxis: Pantoprazole 40 mg daily for 30 days  - DVT prophylaxis: Subcutaneous heparin, SCD     Disposition:   - Transferred to  on 5/10   - Therapies recommending discharge to Home vs ARU    Discussed with CVTS Fellow as needed.  Staff surgeons have been informed of changes through both verbal and written communication.      Nash Amado PA-C  Cardiothoracic Surgery  Pager [phone]    9:57 AM   May 18, 2021        Interval History:     No overnight events.  R hip pain stable.   States pain is well managed on current regimen. Slept well overnight.  Tolerating diet, is passing flatus, needs to have a BM. No nausea or vomiting.  Breathing well without complaints.   Working with therapies and ambulating in halls with assistance.   Denies chest pain,  "palpitations, dizziness, syncopal symptoms, fevers, chills, myalgias, or sternal popping/clicking.         Physical Exam:   Blood pressure 122/70, pulse 89, temperature 97.5  F (36.4  C), temperature source Oral, resp. rate 16, height 1.727 m (5' 8\"), weight 108.9 kg (240 lb), SpO2 97 %.  Vitals:    05/16/21 0500 05/17/21 1230 05/18/21 0534   Weight: 106.7 kg (235 lb 4.8 oz) 108.4 kg (239 lb) 108.9 kg (240 lb)      Weight; + 0 kg since surgery and trending stable x 2 days.   24 hr Fluid status; net loss 465 mL.   MAPs: 80 - 90    Gen: A&Ox4, NAD  Neuro: no focal deficits   CV: RRR, normal S1 S2, no murmurs, rubs or gallops.   Pulm: CTA, RLL wheezing without rhonchi, normal breathing on RA  Abd: nondistended, normal BS, soft, nontender  Ext: moderate peripheral edema, 2+ pitting  Incision: clean, dry, intact, no erythema, sternum stable  Tubes/drain sites: dressing clean and dry, serosanguinous output, no air leak. 24 hr output 600 mL.          Data:    Imaging:  reviewed recent imaging, no acute concerns    Labs:  BMP  Recent Labs   Lab 05/18/21  0746 05/17/21  0530 05/16/21  1735 05/16/21  0555   * 131* 130* 131*   POTASSIUM 4.4 4.4 4.6 4.1   CHLORIDE 94 97 96 98   QAMAR 7.8* 7.6* 7.9* 7.8*   CO2 28 29 28 29   BUN 53* 48* 44* 43*   CR 1.74* 1.78* 1.75* 1.76*   * 194* 172* 159*     CBC  Recent Labs   Lab 05/18/21  0746 05/17/21  0530 05/16/21  0555 05/15/21  0533   WBC 17.3* 18.7* 17.5* 17.6*   RBC 2.69* 2.50* 2.65* 2.68*   HGB 8.3* 7.8* 8.2* 8.2*   HCT 25.7* 24.5* 25.8* 26.0*   MCV 96 98 97 97   MCH 30.9 31.2 30.9 30.6   MCHC 32.3 31.8 31.8 31.5   RDW 15.7* 15.3* 15.3* 15.0    292 293 284     INR  Recent Labs   Lab 05/12/21  0622   INR 1.19*      Hepatic Panel  Recent Labs   Lab 05/17/21  0530 05/14/21  0521 05/12/21  0622   AST 24 32 33   ALT 44 47 52   ALKPHOS 80 70 71   BILITOTAL 0.4 0.6 0.5   ALBUMIN 2.5* 2.5* 2.7*     GLUCOSE:   Recent Labs   Lab 05/18/21  0746 05/18/21  0731 05/17/21  2111 " 05/17/21  1743 05/17/21  1153 05/17/21  0743 05/17/21  0530 05/16/21  2117 05/16/21  1735 05/16/21  1735 05/16/21  0555 05/16/21  0555 05/15/21  2001 05/15/21  0533 05/15/21  0533   *  --   --   --   --   --  194*  --   --  172*  --  159* 167*  --  145*   BGM  --  190* 176* 229* 137* 218*  --  208*   < >  --    < >  --   --    < >  --     < > = values in this interval not displayed.

## 2021-05-18 NOTE — PLAN OF CARE
D/I/A:  Pt s/p heart transplant 5/6/21.  Pt with CT +airleak.  CT noted to be at -20sx, flow sheets charted with -15sx, current order with discrepancy.  Paged on call.  Order written to have CT to -15 and have team address in the am.  Pt resp status stable.  BP elevated at 00-on call aware-no changes.  Hip pain improved with positioning and prns.  Pt in good spirits.  Slept between cares.  Pt declines 0200 BG check.    P:  Update team with changes/concerns.

## 2021-05-18 NOTE — PROGRESS NOTES
D:Regular diet with good nutritional intake.  Moderate  Pain.  Medicated with   Oral dilaudid 4mg with relief.   Up with PT ambulating in alls.   Chest tube to -10cm suction.   Container changed.  Output this shift 180ml.   Rhythm is NSR 90's.  No fever.  Bp within normal limits.    A:Tolerating diet and activity well.   Pain under control  Active in therapies.   Rhythm is stable.   AVSS. O2 sats normal  (see flow sheets for details. )  Sternal incision intact.   No redness or  Swelling.   Moderate  To severe edema in  Lower extremities.   Lymphadema wraps on.    P:Continue to assess status.   Monitor rhythm, temp and vital signs and O2 sats

## 2021-05-18 NOTE — PROGRESS NOTES
05/18/21 1400   General Information   Onset of Illness/Injury or Date of Surgery 05/18/21  (date of edema consult)   Referring Physician Nash Amado PA   Patient/Family Therapy Goals Statement (PT) to reduce limbs to normal size for improved ambulation tolerance   Pertinent History of Current Problem (include personal factors and/or comorbidities that impact the POC) s/p orthotopic heart transplant on 5/6/21 with Dr. Ronnie Quigley   Edema General Information   Onset of Edema 05/18/21  (date of edema consult)   Affected Body Part(s) Left LE;Right LE   Edema Etiology Surgery;Other (see comments)  (heart transplant)   Etiology Comments recent heart transplant   Edema Precautions Cardiac Edema/CHF   Edema Precaution Comments monitor for increased SOB   General Comments/Previous Edema Treatment/Edema Equipment pt denies hx of recent edema, has been hospitalized since early Feb.    Edema Examination/Assessment   Skin Condition Pitting   Skin Condition Comments 3+ pitting edema. Skin intact, mild dryness over heels/ forefoot javier   Scar No   Ulcerations No   Radial Pulse Symmetrical   Dorsal Pedal Pulse Symmetrical   Skin Integrity intact, soft   Pitting Assessment 3+ pitting concentrated over javier shins/ ankles, ~2-3+ over dorsum of feet   Fibrosis Assessment non fibrotic   Cognition   Orientation Status (Cognition) oriented x 4   Clinical Impression   Criteria for Skilled Therapeutic Intervention yes, treatment indicated   PT Diagnosis (PT) impaired functional status 2/2 edema   Edema: Patient Presentation Stage 1 Lymphedema   Therapy Frequency (PT) Daily   Predicted Duration of Therapy Intervention (days/wks) ~1 week of edema treatment   Edema: Planned Interventions Gradient compression bandaging;Fit for compression garment;Edema exercises;Precautions to prevent infection/exacerbation;Education;Manual therapy   Risk & Benefits of therapy have been explained risks/benefits reviewed;evaluation/treatment  results reviewed;care plan/treatment goals reviewed;current/potential barriers reviewed;participants voiced agreement with care plan;participants included;patient   Clinical Impression Comments PT: Pt presents with new 3+ pitting edema in BLEs impacting tolerance and ease of functional mobility. Will benefit from edema management while inpatient to address impairments and improve fucntional status.

## 2021-05-18 NOTE — PROGRESS NOTES
[facility]   Cardiology II Service / Advanced Heart Failure  Daily Progress Note        Patient: Jim Willingham  MRN: [id number]  Admission Date: 2/8/2021  Hospital Day # 99    Assessment and Plan: Jim Willingham is a 65 year old male with history of NICM EF 20% c/b VT s/p CRT-D s/p HMII LVAD 6/19/2017 originally intended as destination therapy 2/2 obesity however with recent weight loss now candidate for transplantation. He is admitted for decompensated heart failure with subsequent s/p OHT 5/6/21.     Recommendations today:   - Agree with Chest/abd/pelvis scan  - Continue to monitor off abx at this point  - Check C. Diff if he develops diarrhea  - Increase Bumex to 4 mg IV BID, aiming for 2L net negative  dialy  - Continue Tac 3 mg po BID, continue daily levels  - Will plan for RHC and biopsy on Thursday    S/p OHT. 5/6/21 secondary to NICM. Echo 5/13/21 conistent with EF 65-70%, mildly decreased RV function, and dilated IVC. RHC 5/13/21 with mRA-5, RV-30/5, mPA-20, mPCWP-10, MICHELLE CO-5.8, and MICHELLE CI-2.63, and SVR-914.   Immunosuppression: Simulect induction 5/6 and 5/10. Tac increased to 3 mg po BID, level 3.6. Goal 10-12. Prednisone taper. Cellcept 1500 mg po BID.   Serostatus: CMV: D?, R+, EBV: R+, Toxo R-  Prophylaxis: Dapsone, Nystatin, and Valcyte (3-6 months pending donor status).   - Increased bumex to 4 mg IV BID  - Continue Crestor and ASA.   - DSA 5/13 pending  - 5/13 NER    Leukocytosis. No consolidation on CXR. No symptoms. Has been afebrile. U/A bland. CRP is low although note his significant immunsupression  - Agree with Chest/abd/pelvis scan  - Continue to monitor off abx at this point  - Blood culture NGTD  - Would Check C. Diff if diarrhea reoccurs  - Continue trending WBC    Right Apical Pneumothorax.   - Management per CVTS.   ================================================================  Interval History/ROS:  He denies fever, chills, chest pain, palpitations, cough,  "diarrhea. Hip painis better. LE edema slightly worse than yesterday. No abdominal pain, nausea or vomiting.     Last 24 hr care team notes reviewed.   ROS:  4 point ROS including Respiratory, CV, GI and , other than that noted in the HPI, is negative.     Medications: Reviewed in EPIC.     Physical Exam:   /70 (BP Location: Left arm)   Pulse 89   Temp 97.5  F (36.4  C) (Oral)   Resp 16   Ht 1.727 m (5' 8\")   Wt 108.9 kg (240 lb)   SpO2 97%   BMI 36.49 kg/m    GENERAL: Appears alert and interacting appropriately.   HEENT: Eye symmetrical and free of discharge bilaterally. Mucous membranes moist and without lesions.  NECK: Supple and without lymphadenopathy. JVD below clavicular line.    CV: RRR, S1S2 present without murmur, rub, and gallop.   RESPIRATORY: Respirations regular, even, and unlabored. Lungs CTA throughout.   GI: Soft and non distended with normoactive bowel sounds present in all quadrants. No tenderness, rebound, guarding. No organomegaly.   EXTREMITIES: 2+ bilateral LE peripheral edema. 2+ bilateral pedal pulses.   NEUROLOGIC: Alert and interacting appropriatly. No focal deficits.   MUSCULOSKELETAL: No joint swelling or tenderness.   SKIN: No jaundice. No rashes or lesions.     Data:  CMP  Recent Labs   Lab 05/18/21  0746 05/17/21  0530 05/16/21  1735 05/16/21  0555 05/14/21  0521 05/14/21  0521 05/12/21  0622 05/12/21  0622   * 131* 130* 131*   < > 132*   < > 132*   POTASSIUM 4.4 4.4 4.6 4.1   < > 4.5   < > 4.7   CHLORIDE 94 97 96 98   < > 98   < > 100   CO2 28 29 28 29   < > 29   < > 27   ANIONGAP 6 5 6 4   < > 5   < > 4   * 194* 172* 159*   < > 177*   < > 180*   BUN 53* 48* 44* 43*   < > 40*   < > 54*   CR 1.74* 1.78* 1.75* 1.76*   < > 1.33*   < > 1.40*   GFRESTIMATED 41* 39* 40* 40*   < > 56*   < > 53*   GFRESTBLACK 47* 46* 47* 46*   < > 65   < > 61   QAMAR 7.8* 7.6* 7.9* 7.8*   < > 8.1*   < > 7.8*   MAG  --  2.1  --   --   --  1.9  --  2.3   PROTTOTAL  --  4.9*  --   --   " --  4.7*  --  4.9*   ALBUMIN  --  2.5*  --   --   --  2.5*  --  2.7*   BILITOTAL  --  0.4  --   --   --  0.6  --  0.5   ALKPHOS  --  80  --   --   --  70  --  71   AST  --  24  --   --   --  32  --  33   ALT  --  44  --   --   --  47  --  52    < > = values in this interval not displayed.     CBC  Recent Labs   Lab 05/18/21  0746 05/17/21  0530 05/16/21  0555 05/15/21  0533   WBC 17.3* 18.7* 17.5* 17.6*   RBC 2.69* 2.50* 2.65* 2.68*   HGB 8.3* 7.8* 8.2* 8.2*   HCT 25.7* 24.5* 25.8* 26.0*   MCV 96 98 97 97   MCH 30.9 31.2 30.9 30.6   MCHC 32.3 31.8 31.8 31.5   RDW 15.7* 15.3* 15.3* 15.0    292 293 284     INR  Recent Labs   Lab 05/12/21  0622   INR 1.19*       Patient discussed with Dr. Seth.      Didi Butler PAJanetC  [facility] Cardiology

## 2021-05-18 NOTE — PLAN OF CARE
D:pt feels better getting out of the other side of bed. Right hip feels better  I:chest tube moved to other side of bed, prn meds  P:per Primary

## 2021-05-19 ENCOUNTER — APPOINTMENT (OUTPATIENT)
Dept: PHYSICAL THERAPY | Facility: CLINIC | Age: 66
DRG: 001 | End: 2021-05-19
Attending: INTERNAL MEDICINE
Payer: COMMERCIAL

## 2021-05-19 LAB
ANION GAP SERPL CALCULATED.3IONS-SCNC: 6 MMOL/L (ref 3–14)
ANION GAP SERPL CALCULATED.3IONS-SCNC: 8 MMOL/L (ref 3–14)
BUN SERPL-MCNC: 57 MG/DL (ref 7–30)
BUN SERPL-MCNC: 61 MG/DL (ref 7–30)
CALCIUM SERPL-MCNC: 7.8 MG/DL (ref 8.5–10.1)
CALCIUM SERPL-MCNC: 8 MG/DL (ref 8.5–10.1)
CHLORIDE SERPL-SCNC: 96 MMOL/L (ref 94–109)
CHLORIDE SERPL-SCNC: 97 MMOL/L (ref 94–109)
CO2 SERPL-SCNC: 25 MMOL/L (ref 20–32)
CO2 SERPL-SCNC: 29 MMOL/L (ref 20–32)
CREAT SERPL-MCNC: 1.73 MG/DL (ref 0.66–1.25)
CREAT SERPL-MCNC: 1.76 MG/DL (ref 0.66–1.25)
ERYTHROCYTE [DISTWIDTH] IN BLOOD BY AUTOMATED COUNT: 15.9 % (ref 10–15)
GFR SERPL CREATININE-BSD FRML MDRD: 40 ML/MIN/{1.73_M2}
GFR SERPL CREATININE-BSD FRML MDRD: 41 ML/MIN/{1.73_M2}
GLUCOSE BLDC GLUCOMTR-MCNC: 125 MG/DL (ref 70–99)
GLUCOSE BLDC GLUCOMTR-MCNC: 175 MG/DL (ref 70–99)
GLUCOSE BLDC GLUCOMTR-MCNC: 190 MG/DL (ref 70–99)
GLUCOSE BLDC GLUCOMTR-MCNC: 205 MG/DL (ref 70–99)
GLUCOSE SERPL-MCNC: 103 MG/DL (ref 70–99)
GLUCOSE SERPL-MCNC: 181 MG/DL (ref 70–99)
HCT VFR BLD AUTO: 25.2 % (ref 40–53)
HGB BLD-MCNC: 8 G/DL (ref 13.3–17.7)
MAGNESIUM SERPL-MCNC: 2 MG/DL (ref 1.6–2.3)
MAGNESIUM SERPL-MCNC: 2.1 MG/DL (ref 1.6–2.3)
MCH RBC QN AUTO: 30.7 PG (ref 26.5–33)
MCHC RBC AUTO-ENTMCNC: 31.7 G/DL (ref 31.5–36.5)
MCV RBC AUTO: 97 FL (ref 78–100)
PLATELET # BLD AUTO: 278 10E9/L (ref 150–450)
POTASSIUM SERPL-SCNC: 4.3 MMOL/L (ref 3.4–5.3)
POTASSIUM SERPL-SCNC: 4.4 MMOL/L (ref 3.4–5.3)
RBC # BLD AUTO: 2.61 10E12/L (ref 4.4–5.9)
SODIUM SERPL-SCNC: 130 MMOL/L (ref 133–144)
SODIUM SERPL-SCNC: 130 MMOL/L (ref 133–144)
TACROLIMUS BLD-MCNC: 6.8 UG/L (ref 5–15)
TME LAST DOSE: NORMAL H
WBC # BLD AUTO: 16.7 10E9/L (ref 4–11)

## 2021-05-19 PROCEDURE — 250N000013 HC RX MED GY IP 250 OP 250 PS 637: Performed by: SURGERY

## 2021-05-19 PROCEDURE — 250N000013 HC RX MED GY IP 250 OP 250 PS 637: Performed by: STUDENT IN AN ORGANIZED HEALTH CARE EDUCATION/TRAINING PROGRAM

## 2021-05-19 PROCEDURE — 250N000013 HC RX MED GY IP 250 OP 250 PS 637: Performed by: PHYSICIAN ASSISTANT

## 2021-05-19 PROCEDURE — 250N000013 HC RX MED GY IP 250 OP 250 PS 637: Performed by: NURSE PRACTITIONER

## 2021-05-19 PROCEDURE — 97140 MANUAL THERAPY 1/> REGIONS: CPT | Mod: GP | Performed by: PHYSICAL THERAPIST

## 2021-05-19 PROCEDURE — 83735 ASSAY OF MAGNESIUM: CPT | Performed by: PHYSICIAN ASSISTANT

## 2021-05-19 PROCEDURE — 85027 COMPLETE CBC AUTOMATED: CPT | Performed by: PHYSICIAN ASSISTANT

## 2021-05-19 PROCEDURE — 80197 ASSAY OF TACROLIMUS: CPT | Performed by: NURSE PRACTITIONER

## 2021-05-19 PROCEDURE — 80048 BASIC METABOLIC PNL TOTAL CA: CPT | Performed by: PHYSICIAN ASSISTANT

## 2021-05-19 PROCEDURE — 250N000012 HC RX MED GY IP 250 OP 636 PS 637: Performed by: STUDENT IN AN ORGANIZED HEALTH CARE EDUCATION/TRAINING PROGRAM

## 2021-05-19 PROCEDURE — 97530 THERAPEUTIC ACTIVITIES: CPT | Mod: GP | Performed by: PHYSICAL THERAPIST

## 2021-05-19 PROCEDURE — 999N000157 HC STATISTIC RCP TIME EA 10 MIN

## 2021-05-19 PROCEDURE — 999N001017 HC STATISTIC GLUCOSE BY METER IP

## 2021-05-19 PROCEDURE — 250N000012 HC RX MED GY IP 250 OP 636 PS 637: Performed by: SURGERY

## 2021-05-19 PROCEDURE — 250N000012 HC RX MED GY IP 250 OP 636 PS 637: Performed by: PHYSICIAN ASSISTANT

## 2021-05-19 PROCEDURE — 36592 COLLECT BLOOD FROM PICC: CPT | Performed by: PHYSICIAN ASSISTANT

## 2021-05-19 PROCEDURE — 214N000001 HC R&B CCU UMMC

## 2021-05-19 PROCEDURE — 250N000011 HC RX IP 250 OP 636: Performed by: PHYSICIAN ASSISTANT

## 2021-05-19 PROCEDURE — 97116 GAIT TRAINING THERAPY: CPT | Mod: GP | Performed by: PHYSICAL THERAPIST

## 2021-05-19 PROCEDURE — 99232 SBSQ HOSP IP/OBS MODERATE 35: CPT | Performed by: PHYSICIAN ASSISTANT

## 2021-05-19 PROCEDURE — 250N000011 HC RX IP 250 OP 636: Performed by: NURSE PRACTITIONER

## 2021-05-19 RX ORDER — LACTULOSE 10 G/15ML
20 SOLUTION ORAL DAILY
Status: COMPLETED | OUTPATIENT
Start: 2021-05-19 | End: 2021-05-20

## 2021-05-19 RX ORDER — NYSTATIN 100000/ML
1000000 SUSPENSION, ORAL (FINAL DOSE FORM) ORAL 4 TIMES DAILY
Status: DISCONTINUED | OUTPATIENT
Start: 2021-05-19 | End: 2021-05-31 | Stop reason: HOSPADM

## 2021-05-19 RX ADMIN — ALLOPURINOL 300 MG: 300 TABLET ORAL at 08:25

## 2021-05-19 RX ADMIN — INSULIN ASPART 9 UNITS: 100 INJECTION, SOLUTION INTRAVENOUS; SUBCUTANEOUS at 21:10

## 2021-05-19 RX ADMIN — Medication 37.5 MG: at 23:32

## 2021-05-19 RX ADMIN — GABAPENTIN 600 MG: 300 CAPSULE ORAL at 21:15

## 2021-05-19 RX ADMIN — HEPARIN SODIUM 5000 UNITS: 5000 INJECTION, SOLUTION INTRAVENOUS; SUBCUTANEOUS at 05:55

## 2021-05-19 RX ADMIN — BUMETANIDE 4 MG: 0.25 INJECTION INTRAMUSCULAR; INTRAVENOUS at 09:07

## 2021-05-19 RX ADMIN — HEPARIN SODIUM 5000 UNITS: 5000 INJECTION, SOLUTION INTRAVENOUS; SUBCUTANEOUS at 12:15

## 2021-05-19 RX ADMIN — BUMETANIDE 4 MG: 0.25 INJECTION INTRAMUSCULAR; INTRAVENOUS at 14:56

## 2021-05-19 RX ADMIN — UMECLIDINIUM 1 PUFF: 62.5 AEROSOL, POWDER ORAL at 08:28

## 2021-05-19 RX ADMIN — MONTELUKAST 10 MG: 10 TABLET, FILM COATED ORAL at 21:15

## 2021-05-19 RX ADMIN — SODIUM CHLORIDE, PRESERVATIVE FREE 5 ML: 5 INJECTION INTRAVENOUS at 05:31

## 2021-05-19 RX ADMIN — BUPROPION HYDROCHLORIDE 100 MG: 100 TABLET, EXTENDED RELEASE ORAL at 08:25

## 2021-05-19 RX ADMIN — VALGANCICLOVIR 450 MG: 450 TABLET, FILM COATED ORAL at 08:28

## 2021-05-19 RX ADMIN — Medication 1 TABLET: at 19:27

## 2021-05-19 RX ADMIN — NYSTATIN 1000000 UNITS: 500000 SUSPENSION ORAL at 21:14

## 2021-05-19 RX ADMIN — TACROLIMUS 3 MG: 1 CAPSULE ORAL at 18:02

## 2021-05-19 RX ADMIN — NYSTATIN 1000000 UNITS: 500000 SUSPENSION ORAL at 16:20

## 2021-05-19 RX ADMIN — TACROLIMUS 3 MG: 1 CAPSULE ORAL at 08:13

## 2021-05-19 RX ADMIN — PREDNISONE 20 MG: 20 TABLET ORAL at 08:14

## 2021-05-19 RX ADMIN — NYSTATIN 1000000 UNITS: 500000 SUSPENSION ORAL at 08:24

## 2021-05-19 RX ADMIN — POLYETHYLENE GLYCOL 3350 17 G: 17 POWDER, FOR SOLUTION ORAL at 08:29

## 2021-05-19 RX ADMIN — Medication 10 MG: at 23:33

## 2021-05-19 RX ADMIN — ACETAMINOPHEN 975 MG: 325 TABLET, FILM COATED ORAL at 05:56

## 2021-05-19 RX ADMIN — HYDROMORPHONE HYDROCHLORIDE 4 MG: 2 TABLET ORAL at 06:01

## 2021-05-19 RX ADMIN — SENNOSIDES AND DOCUSATE SODIUM 1 TABLET: 8.6; 5 TABLET ORAL at 21:22

## 2021-05-19 RX ADMIN — PANTOPRAZOLE SODIUM 40 MG: 40 TABLET, DELAYED RELEASE ORAL at 08:25

## 2021-05-19 RX ADMIN — MYCOPHENOLATE MOFETIL 1500 MG: 250 CAPSULE ORAL at 19:26

## 2021-05-19 RX ADMIN — SODIUM CHLORIDE, PRESERVATIVE FREE 5 ML: 5 INJECTION INTRAVENOUS at 14:58

## 2021-05-19 RX ADMIN — NYSTATIN 1000000 UNITS: 500000 SUSPENSION ORAL at 08:59

## 2021-05-19 RX ADMIN — DAPSONE 50 MG: 25 TABLET ORAL at 08:15

## 2021-05-19 RX ADMIN — LACTULOSE 20 G: 20 SOLUTION ORAL at 10:05

## 2021-05-19 RX ADMIN — GABAPENTIN 300 MG: 300 CAPSULE ORAL at 08:25

## 2021-05-19 RX ADMIN — SODIUM CHLORIDE, PRESERVATIVE FREE 5 ML: 5 INJECTION INTRAVENOUS at 17:47

## 2021-05-19 RX ADMIN — ACETAMINOPHEN 975 MG: 325 TABLET, FILM COATED ORAL at 21:15

## 2021-05-19 RX ADMIN — SENNOSIDES AND DOCUSATE SODIUM 1 TABLET: 8.6; 5 TABLET ORAL at 10:02

## 2021-05-19 RX ADMIN — HEPARIN SODIUM 5000 UNITS: 5000 INJECTION, SOLUTION INTRAVENOUS; SUBCUTANEOUS at 21:15

## 2021-05-19 RX ADMIN — FLUTICASONE FUROATE AND VILANTEROL TRIFENATATE 1 PUFF: 100; 25 POWDER RESPIRATORY (INHALATION) at 08:28

## 2021-05-19 RX ADMIN — INSULIN ASPART 2 UNITS: 100 INJECTION, SOLUTION INTRAVENOUS; SUBCUTANEOUS at 23:29

## 2021-05-19 RX ADMIN — INSULIN GLARGINE 25 UNITS: 100 INJECTION, SOLUTION SUBCUTANEOUS at 08:47

## 2021-05-19 RX ADMIN — ROSUVASTATIN CALCIUM 10 MG: 10 TABLET, FILM COATED ORAL at 08:25

## 2021-05-19 RX ADMIN — INSULIN ASPART 14 UNITS: 100 INJECTION, SOLUTION INTRAVENOUS; SUBCUTANEOUS at 12:31

## 2021-05-19 RX ADMIN — Medication 1 TABLET: at 08:26

## 2021-05-19 RX ADMIN — BUPROPION HYDROCHLORIDE 100 MG: 100 TABLET, EXTENDED RELEASE ORAL at 19:27

## 2021-05-19 RX ADMIN — HYDROMORPHONE HYDROCHLORIDE 4 MG: 2 TABLET ORAL at 21:45

## 2021-05-19 RX ADMIN — GABAPENTIN 600 MG: 300 CAPSULE ORAL at 14:57

## 2021-05-19 RX ADMIN — ASPIRIN 81 MG CHEWABLE TABLET 81 MG: 81 TABLET CHEWABLE at 08:26

## 2021-05-19 RX ADMIN — PREDNISONE 20 MG: 20 TABLET ORAL at 19:27

## 2021-05-19 RX ADMIN — Medication 5 ML: at 14:57

## 2021-05-19 RX ADMIN — ACETAMINOPHEN 975 MG: 325 TABLET, FILM COATED ORAL at 14:57

## 2021-05-19 RX ADMIN — HYDROMORPHONE HYDROCHLORIDE 4 MG: 2 TABLET ORAL at 12:14

## 2021-05-19 RX ADMIN — MYCOPHENOLATE MOFETIL 1500 MG: 250 CAPSULE ORAL at 08:13

## 2021-05-19 ASSESSMENT — ACTIVITIES OF DAILY LIVING (ADL)
ADLS_ACUITY_SCORE: 14
ADLS_ACUITY_SCORE: 12

## 2021-05-19 NOTE — PROGRESS NOTES
D:Up sitting on  Edge of bed for meals.   Up to  Bedside commode independently.   Out walking in halls with PT and Cardiac rehab.   No shortness of breath.   Rt Chest tube  To -10cm SXN.   Has  A small air leak.   Chest tube output this shift  150 ml.  Chest incision intact.  No marked redness or swelling and no drainage.  Rhythm  NSR 90's,  Temp 97.5,  Bp 120/76  MAP 87  RR 16 O2 sat 96 % on room air.    A:Medicated times one  For moderate pain with relief.   Continue to tolerate diett and activity.   Rhythm is stable.   Afebrile and VSS. Sating normal on room air.   Breathing easy.      P:Continue to monitor rhythm, temp and vital signs and   O2 sats.   Assess  Incision for signs of infection.   Medicate prn pain.   Assess tolerance to diet,  Activity and rehab.

## 2021-05-19 NOTE — PROGRESS NOTES
CLINICAL NUTRITION SERVICES - REASSESSMENT NOTE     Nutrition Prescription    RECOMMENDATIONS FOR MDs/PROVIDERS TO ORDER:  None at this time.     Malnutrition Status:    Non-severe malnutrition in the context of acute on chronic illness    Recommendations already ordered by Registered Dietitian (RD):  None additionally     Future/Additional Recommendations:  1. Diet order per team  2. Monitor glycemic control. DM 2. Hgb A1c of 6.2 on 1/7/21.   3. Bowel med regimen per team   4. Consider checking thiamine, folic acid, and vitamin B12 labs (vit B12 lab was WNL, 450 on 1/22/21).   5. Monitor iron status.  6. Order the following (Sylvester-en-y Gastric Bypass) if pt agreeable:    Multivitamin/minerals: adult dose 2 times daily    Iron: 45-60 mg elemental daily (18-36 mg daily if low risk) - may partly or fully be covered in multivitamin     Calcium Citrate containing vitamin D: Consider additional     Vitamin B12: sublingual form of at least 500 mcg daily or injection of 1000 mcg monthly     B-50 Complex daily. Pt received B12 on 5/13.   7. Watch potassium trends and monitor phos, lab 5.7 on 5/6  8. Continue with Calcium Citrate containing vitamin D supplementation as patient is on prednisone     EVALUATION OF THE PROGRESS TOWARD GOALS   Diet: Regular + 2 Glucerna shakes with lunch (choclate and butter pecan)  Intake: Per flowsheet record, pt consuming % of meals 2-3 times per day. Per conversation with patient, states he is continuing to have decreased appetite as well as menu fatigue. States his wife is able to bring in meals every once in awhile. Continues to consume 1-2 Glucerna per day and is trying to focus on adequate protein with meals.      NEW FINDINGS   Weight: 118.4 kg (2/13/20), 106.2 kg (8/7/20), 103.9 kg (11/13/20), 94.8 kg (1/12/21), 99.5 kg (2/8/21, admit), 100.2 kg (2/15/21), 98.2 kg (2/22), 100 kg (3/2), 99.7 kg (3/11), 99.9 kg (4/9), 101.8 kg (4/26), 102.3 kg (5/5), 110.2 kg (5/12/21), 108.9 kg  (5/18/21) -- Weight fluctuations over the last 1+ week, likely due to fluid shifts post-op. Difficult to assess true weight loss/gain at this time.   Labs: Reviewed   Meds: Reviewed   GI: Last BM 5/18. Pt with 0-3 stools/day.     MALNUTRITION  % Intake: < 75% for > 7 days (non-severe)  % Weight Loss: Difficult to assess due to fluid status   Subcutaneous Fat Loss: None observed  Muscle Loss: None observed  Fluid Accumulation/Edema: Moderate  Malnutrition Diagnosis: Non-severe malnutrition in the context of acute on chronic illness    Previous Goals   Patient to consume % of nutritionally adequate meal trays TID, or the equivalent with supplements/snacks.  Evaluation: Not met, improving     Previous Nutrition Diagnosis  Inadequate oral intake related to decreased appetite, taste changes, menu fatigue as evidenced by patient consuming </= 50% for >/= 5 days.   Evaluation: Improving    CURRENT NUTRITION DIAGNOSIS  Inadequate oral intake related to decreased appetite, menu fatigue as evidenced by patient consuming < 75% for > 7 days    INTERVENTIONS  Implementation  Nutrition Education: Encouraged oral intake as tolerated. Reviewed sources of protein and ways to increase oral intake throughout the day.     Goals  Patient to consume % of nutritionally adequate meal trays TID, or the equivalent with supplements/snacks.    Monitoring/Evaluation  Progress toward goals will be monitored and evaluated per protocol.    Mp Smith   Dietetic Intern     I have read and agree with the above nutrition note.   Sana Costello, MS, RD, LD, CNSC   6C Pgr: [phone]

## 2021-05-19 NOTE — CONSULTS
Health Psychology                      Ronit Echeverria, Ph.D., L.P [phone]  Deepika Cook, Ph.D., L.P. [phone]  Karla Day, Ph.D. [phone]  Rajani Bejarano, Ph.D., L.P. [phone]  Parviz Hobbs, Ph.D., A.B.P.P., L.P. [phone]         Jennifer Bahena, Ph.D., L.P. [phone]     [facility], 3rd Floor  [address] [place] [address]    Inpatient Health Psychology Consultation    Mr. Willingham is a 65-year-old male status post heart transplant.  I had been following him pretransplant for monitoring mood in the context of a prolonged hospitalization.  Briefly checked is not with him today and he stated mood has been good and he feels as if his recovery is ahead of schedule.  He he was unable to speak on the phone today as he was about to go on a walk with physical therapy.  No mental health concerns addressed today, health psychology will sign off at this time but please feel free to reconsult if further needs arise.    Rajani Bejarano, PhD,   Clinical Health Psychologist

## 2021-05-19 NOTE — PROGRESS NOTES
[facility]   Cardiology II Service / Advanced Heart Failure  Daily Progress Note        Patient: Jim Willingham  MRN: [id number]  Admission Date: 2/8/2021  Hospital Day # 100    Assessment and Plan: Jim Willingham is a 65 year old male with history of NICM EF 20% c/b VT s/p CRT-D s/p HMII LVAD 6/19/2017 originally intended as destination therapy 2/2 obesity however with recent weight loss now candidate for transplantation. He is admitted for decompensated heart failure with subsequent s/p OHT 5/6/21.     Recommendations today:   - RHC and Biopsy tomorrow, will be NPO at [place]  - Continue to monitor off abx at this point  - Increase Bumex to 4 mg IV BID, aiming for 2L net negative  dialy  - Continue Tac 3 mg po BID, continue daily levels    S/p OHT. 5/6/21 secondary to NICM. Echo 5/13/21 conistent with EF 65-70%, mildly decreased RV function, and dilated IVC. RHC 5/13/21 with mRA-5, RV-30/5, mPA-20, mPCWP-10, MICHELLE CO-5.8, and MICHELLE CI-2.63, and SVR-914.   Immunosuppression: Simulect induction 5/6 and 5/10. Tac increased to 3 mg po BID, level 3.6. Goal 10-12. Prednisone taper. Cellcept 1500 mg po BID.   Serostatus: CMV: D?, R+, EBV: R+, Toxo R-  Prophylaxis: Dapsone, Nystatin, and Valcyte (3-6 months pending donor status).   - Increased bumex to 4 mg IV BID  - Continue Crestor and ASA.   - DSA 5/13 pending  - 5/13 NER    Leukocytosis. No consolidation on CXR. No symptoms. Has been afebrile. U/A bland. CRP is low although note his significant immunosuppression. Chest/Abdomen/Pelvis CT with a left lower lobe consolidation, no current symptoms (scant dry cough), WBC improving off antibiotics, but would consider this as potential source in the future if not resolving.  - Continue to monitor off abx at this point  - Blood culture NGTD  - Would Check C. Diff if diarrhea reoccurs  - Continue trending WBC    Right Apical Pneumothorax.   - Management per CVTS.  "  ================================================================  Interval History/ROS:  He denies fever, chills, chest pain, palpitations, cough, diarrhea. Hip painis better. LE edema slightly better than yesterday. No abdominal pain, nausea or vomiting.     Last 24 hr care team notes reviewed.   ROS:  4 point ROS including Respiratory, CV, GI and , other than that noted in the HPI, is negative.     Medications: Reviewed in EPIC.     Physical Exam:   /76 (BP Location: Left arm)   Pulse 94   Temp 97.5  F (36.4  C) (Oral)   Resp 16   Ht 1.727 m (5' 8\")   Wt 108.9 kg (240 lb)   SpO2 96%   BMI 36.49 kg/m    GENERAL: Appears alert and interacting appropriately.   HEENT: Eye symmetrical and free of discharge bilaterally. Mucous membranes moist and without lesions.  NECK: Supple and without lymphadenopathy. JVD below clavicular line.    CV: RRR, S1S2 present without murmur, rub, and gallop.   RESPIRATORY: Respirations regular, even, and unlabored. Lungs CTA throughout.   GI: Soft and non distended with normoactive bowel sounds present in all quadrants. No tenderness, rebound, guarding. No organomegaly.   EXTREMITIES: 2+ bilateral LE peripheral edema. 2+ bilateral pedal pulses.   NEUROLOGIC: Alert and interacting appropriatly. No focal deficits.   MUSCULOSKELETAL: No joint swelling or tenderness.   SKIN: No jaundice. No rashes or lesions.     Data:  CMP  Recent Labs   Lab 05/19/21  0543 05/18/21  1704 05/18/21  0746 05/17/21  0530 05/14/21  0521 05/14/21  0521   * 131* 128* 131*   < > 132*   POTASSIUM 4.4 4.3 4.4 4.4   < > 4.5   CHLORIDE 97 97 94 97   < > 98   CO2 25 28 28 29   < > 29   ANIONGAP 8 6 6 5   < > 5   * 158* 180* 194*   < > 177*   BUN 57* 55* 53* 48*   < > 40*   CR 1.73* 1.91* 1.74* 1.78*   < > 1.33*   GFRESTIMATED 41* 36* 41* 39*   < > 56*   GFRESTBLACK 47* 42* 47* 46*   < > 65   QAMAR 8.0* 7.6* 7.8* 7.6*   < > 8.1*   MAG 2.1  --   --  2.1  --  1.9   PROTTOTAL  --   --   --  4.9*  " --  4.7*   ALBUMIN  --   --   --  2.5*  --  2.5*   BILITOTAL  --   --   --  0.4  --  0.6   ALKPHOS  --   --   --  80  --  70   AST  --   --   --  24  --  32   ALT  --   --   --  44  --  47    < > = values in this interval not displayed.     CBC  Recent Labs   Lab 05/19/21  0543 05/18/21  0746 05/17/21  0530 05/16/21  0555   WBC 16.7* 17.3* 18.7* 17.5*   RBC 2.61* 2.69* 2.50* 2.65*   HGB 8.0* 8.3* 7.8* 8.2*   HCT 25.2* 25.7* 24.5* 25.8*   MCV 97 96 98 97   MCH 30.7 30.9 31.2 30.9   MCHC 31.7 32.3 31.8 31.8   RDW 15.9* 15.7* 15.3* 15.3*    318 292 293     INR  No lab results found in last 7 days.    Patient discussed with Dr. Seth.      Didi Butler, PA-C  [facility] Cardiology

## 2021-05-19 NOTE — PROGRESS NOTES
Cardiovascular Surgery Progress Note  05/19/2021         Assessment and Plan:     Mr. Jim Willingham is a 65 year old male with history of NICM EF 20% c/b VT s/p CRT-D s/p HMII LVAD 6/19/2017 originally intended as destination therapy 2/2 obesity however with recent weight loss now candidate for transplantation. He was admitted 2/8/21 for worsening functional status and renal function concerning for worsening heart failure. He was eventually listed status 2E for transplant and received a donor offer.   Jim is now s/p orthotopic heart transplant on 5/6/21 with Dr. Ronnie Quigley. He was shocked > 2x received amio, mag and lidocaine while coming off bypass. Known lung injury with isolated air leak to right pleural.      Cardiovascular:   Hx of NICM EF 20%, VT (ICD)  Hx HeartMate II LVAD 6/19/2017  Cardiorenal syndrome   S/P orthotopic heart transplant   No arrhythmia, HD stable.  Most recent echo showed LV EF 65-70% on 5/9/21.  Basiliximab 5/10.  Terbutaline weaned to off.    ASA 81 mg, Crestor 10 mg daily.   Diuresis with Bumex 4 mg IV BID, Cards II following.   Chest tubes: Airleak isolated to Right pleural. Had some CP after walking off suction 5/13 with associated subcutaneous emphysema, needed portable suction while ambulating. Can now ambulate OFF suction 5/19. Keep to -10 mmHg while in room to reduce subcutaneous emphysema from getting worse. Very slow intermittent air leak 5/19, will do clamping trial before removing (when output improves).   TPW: removed.   - Lymphedema wraps 5/18       Pulmonary:  Hx COPD and CECILIA on CPAP  Air leak from Right pleural tube (unintended injury during surgery)   - PTA Breo ellipta, Incruse ellipta, and Singulair   - Extubated POD 1 to 4 lpm via NC. Now saturating well on RA.   - Air Leak seems to be improving, CXR 5/15 on waterseal   - Pulm toilet, IS, activity and deep breathing  - R pleural fluid Triglycerides normal on 5/17 (first day of increased drainage)     Neurology /  MSK:  Hx Depression  - PTA bupropion and amitriptyline  Post-op Status  - Neuro intact  - Acute post-operative pain well controlled with acetaminophen, PO dilaudid PRN, IV dilaudid PRN  R Hip Pain   - Complaining of R hip pain increasing over past 2 days, keeping him from raising legs into bed independently. Pain with flexion and abduction. Hip Xray 5/16 without acute concerns, try Ice.       / Renal:  WALTER on CKD stage III   - Cardiorenal syndrome improving. Most recent creatinine 1.73, adequate UOP.   - Day before surgery weight 102.3 kg.      GI / FEN:   Hx Sylvester-en-Y gastric bypass  Non-severe Mild Protein-Calorie Malnutrition   - Regular diet with supplements, continue bowel regimen  - Replace electrolytes as needed, hepatic enzymes WNL.  - +BM 5/18 with lactulose. Will continue for a few days per patient request.      Endocrine:  DMT2  Gout   Stress induced hyperglycemia initially managed on insulin drip postop, transitioned to NPH BID and sliding scale. Converted to Lantus 5/14 am, continue prandial and high resistance sliding corrective scale.   - PTA allopurinol.      Infectious Disease:  Stress induced leukocytosis  - WBC 16.7, remains afebrile, no signs or symptoms of infection. UA clean 5/15.   - Completed perioperative antibiotics.   - Persistent elevated WBC, CT Ch/ Ad/ Pv 5/18.      Hematology:   Acute blood loss anemia and thrombocytopenia  Hgb 8.0, Plts WNL, no signs or symptoms of active bleeding     Anticoagulation:   - ASA only     Prophylaxis:   - Stress ulcer prophylaxis: Pantoprazole 40 mg daily for 30 days  - DVT prophylaxis: Subcutaneous heparin, SCD     Disposition:   - Transferred to  on 5/10   - Therapies recommending discharge to Home vs ARU    Discussed with CVTS Fellow as needed.  Staff surgeons have been informed of changes through both verbal and written communication.      Nash Amado PA-C  Cardiothoracic Surgery  Pager [phone]    8:37 AM   May 19, 2021        Interval  "History:     No overnight events.   States pain is well managed on current regimen. Slept well overnight.  Tolerating diet but not very hungry yet, is passing flatus, + BM yesterday. No nausea or vomiting.  Breathing well without complaints with still some cough  Working with therapies and ambulating in halls with assistance.   Denies chest pain, palpitations, dizziness, syncopal symptoms, fevers, chills, myalgias, or sternal popping/clicking.         Physical Exam:   Blood pressure 101/68, pulse 94, temperature 97.9  F (36.6  C), temperature source Oral, resp. rate 20, height 1.727 m (5' 8\"), weight 108.9 kg (240 lb), SpO2 98 %.  Vitals:    05/16/21 0500 05/17/21 1230 05/18/21 0534   Weight: 106.7 kg (235 lb 4.8 oz) 108.4 kg (239 lb) 108.9 kg (240 lb)      Weight; + 6 kg since surgeryand trending up x 2 days.   24 hr Fluid status; net loss 650 mL.   MAPs: 78 - 83     Gen: A&Ox4, NAD  Neuro: no focal deficits   CV: RRR, normal S1 S2, no murmurs, rubs or gallops.   Pulm: CTA, no wheezing or rhonchi, normal breathing on RA  Abd: nondistended, normal BS, soft, nontender  Ext: moderate peripheral edema, 2+ pitting, legs wrapped   Incision: clean, dry, intact, no erythema, sternum stable  Tubes/drain sites: dressing clean and dry         Data:    Imaging:  reviewed recent imaging, no acute concerns, residual subcutaneous emphysema     Labs:  BMP  Recent Labs   Lab 05/19/21  0543 05/18/21  1704 05/18/21  0746 05/17/21  0530   * 131* 128* 131*   POTASSIUM 4.4 4.3 4.4 4.4   CHLORIDE 97 97 94 97   QAMAR 8.0* 7.6* 7.8* 7.6*   CO2 25 28 28 29   BUN 57* 55* 53* 48*   CR 1.73* 1.91* 1.74* 1.78*   * 158* 180* 194*     CBC  Recent Labs   Lab 05/19/21  0543 05/18/21  0746 05/17/21  0530 05/16/21  0555   WBC 16.7* 17.3* 18.7* 17.5*   RBC 2.61* 2.69* 2.50* 2.65*   HGB 8.0* 8.3* 7.8* 8.2*   HCT 25.2* 25.7* 24.5* 25.8*   MCV 97 96 98 97   MCH 30.7 30.9 31.2 30.9   MCHC 31.7 32.3 31.8 31.8   RDW 15.9* 15.7* 15.3* 15.3*   PLT " 278 318 292 293     INR  No lab results found in last 7 days.   Hepatic Panel  Recent Labs   Lab 05/17/21  0530 05/14/21  0521   AST 24 32   ALT 44 47   ALKPHOS 80 70   BILITOTAL 0.4 0.6   ALBUMIN 2.5* 2.5*     GLUCOSE:   Recent Labs   Lab 05/19/21  0741 05/19/21  0543 05/18/21  2048 05/18/21  1704 05/18/21  1241 05/18/21  1200 05/18/21  0746 05/18/21  0731 05/17/21  2111 05/17/21  0530 05/17/21  0530 05/16/21  1735 05/16/21  1735 05/16/21  0555 05/16/21  0555   GLC  --  181*  --  158*  --   --  180*  --   --   --  194*  --  172*  --  159*   *  --  193*  --  304* 446*  --  190* 176*   < >  --    < >  --    < >  --     < > = values in this interval not displayed.

## 2021-05-19 NOTE — PROGRESS NOTES
Transplant Social Work Services Progress Note      Date of Initial Social Work Evaluation: 2/10/2021  Collaborated with: Pt and pt's wife, Fiordaliza, at bedside     Data: Pt is s/p LVAD explant and heart transplant on 5/6. Pt extubated 5/7. Pt working with therapies and current rec is ARU vs home.  Pt reports he has started learning about his medications from bedside RNs.     Intervention: Supportive Visit   Assessment: Pt is coping really well with prolonged hospitalization and post-op. Pt with positive attitude and realistic expectations. Pt is really motivated to discharge home, but would be agreeable to ARU if this is what he needs at time of discharge.   Pt and wife acknowledged excitement but hesitancy with transition home. Explained to them this is a normal feeling to have and that through more teaching they will gain confidence. Did provide education that transition home can be challenging as pt reacclimates to his home, after being away for 100+ days and establishes new routines centered around his transplant. Encouraged ongoing participation in heart transplant support group when he discharges home.     Education provided by SW: Ongoing Social Work support, discharge planning  Plan:    Discharge Plans in Progress: ARU vs home     Barriers to d/c plan: Medical     Follow up Plan: SW to continue to follow.

## 2021-05-19 NOTE — PLAN OF CARE
"6C OT: Cancel     Pt declined OT session this PM, stating, \"I'm just feeling so stuffed from eating.\" Pt agreeable to OT/CR plan tomorrow, will cancel and reschedule.   "

## 2021-05-20 ENCOUNTER — HOSPITAL ENCOUNTER (OUTPATIENT)
Facility: CLINIC | Age: 66
End: 2021-05-20
Attending: INTERNAL MEDICINE | Admitting: INTERNAL MEDICINE
Payer: COMMERCIAL

## 2021-05-20 ENCOUNTER — APPOINTMENT (OUTPATIENT)
Dept: PHYSICAL THERAPY | Facility: CLINIC | Age: 66
DRG: 001 | End: 2021-05-20
Attending: INTERNAL MEDICINE
Payer: COMMERCIAL

## 2021-05-20 DIAGNOSIS — Z11.59 ENCOUNTER FOR SCREENING FOR OTHER VIRAL DISEASES: ICD-10-CM

## 2021-05-20 DIAGNOSIS — Z94.1 TRANSPLANTED HEART (H): ICD-10-CM

## 2021-05-20 LAB
ALBUMIN SERPL-MCNC: 2.8 G/DL (ref 3.4–5)
ALP SERPL-CCNC: 102 U/L (ref 40–150)
ALT SERPL W P-5'-P-CCNC: 46 U/L (ref 0–70)
ANION GAP SERPL CALCULATED.3IONS-SCNC: 4 MMOL/L (ref 3–14)
ANION GAP SERPL CALCULATED.3IONS-SCNC: 6 MMOL/L (ref 3–14)
AST SERPL W P-5'-P-CCNC: 28 U/L (ref 0–45)
BILIRUB DIRECT SERPL-MCNC: 0.2 MG/DL (ref 0–0.2)
BILIRUB SERPL-MCNC: 0.5 MG/DL (ref 0.2–1.3)
BUN SERPL-MCNC: 60 MG/DL (ref 7–30)
BUN SERPL-MCNC: 64 MG/DL (ref 7–30)
CALCIUM SERPL-MCNC: 7.8 MG/DL (ref 8.5–10.1)
CALCIUM SERPL-MCNC: 7.9 MG/DL (ref 8.5–10.1)
CHLORIDE SERPL-SCNC: 98 MMOL/L (ref 94–109)
CHLORIDE SERPL-SCNC: 98 MMOL/L (ref 94–109)
CO2 SERPL-SCNC: 27 MMOL/L (ref 20–32)
CO2 SERPL-SCNC: 29 MMOL/L (ref 20–32)
CREAT SERPL-MCNC: 1.95 MG/DL (ref 0.66–1.25)
CREAT SERPL-MCNC: 2.03 MG/DL (ref 0.66–1.25)
CRP SERPL-MCNC: 6.9 MG/L (ref 0–8)
ERYTHROCYTE [DISTWIDTH] IN BLOOD BY AUTOMATED COUNT: 16.7 % (ref 10–15)
GFR SERPL CREATININE-BSD FRML MDRD: 34 ML/MIN/{1.73_M2}
GFR SERPL CREATININE-BSD FRML MDRD: 35 ML/MIN/{1.73_M2}
GLUCOSE BLDC GLUCOMTR-MCNC: 133 MG/DL (ref 70–99)
GLUCOSE BLDC GLUCOMTR-MCNC: 147 MG/DL (ref 70–99)
GLUCOSE BLDC GLUCOMTR-MCNC: 156 MG/DL (ref 70–99)
GLUCOSE BLDC GLUCOMTR-MCNC: 190 MG/DL (ref 70–99)
GLUCOSE BLDC GLUCOMTR-MCNC: 212 MG/DL (ref 70–99)
GLUCOSE SERPL-MCNC: 114 MG/DL (ref 70–99)
GLUCOSE SERPL-MCNC: 276 MG/DL (ref 70–99)
HCT VFR BLD AUTO: 26.5 % (ref 40–53)
HGB BLD-MCNC: 8.4 G/DL (ref 13.3–17.7)
LABORATORY COMMENT REPORT: NORMAL
MAGNESIUM SERPL-MCNC: 2 MG/DL (ref 1.6–2.3)
MAGNESIUM SERPL-MCNC: 2.1 MG/DL (ref 1.6–2.3)
MCH RBC QN AUTO: 30.8 PG (ref 26.5–33)
MCHC RBC AUTO-ENTMCNC: 31.7 G/DL (ref 31.5–36.5)
MCV RBC AUTO: 97 FL (ref 78–100)
PLATELET # BLD AUTO: 305 10E9/L (ref 150–450)
POTASSIUM SERPL-SCNC: 4.2 MMOL/L (ref 3.4–5.3)
POTASSIUM SERPL-SCNC: 4.4 MMOL/L (ref 3.4–5.3)
PROT SERPL-MCNC: 5.3 G/DL (ref 6.8–8.8)
RBC # BLD AUTO: 2.73 10E12/L (ref 4.4–5.9)
SARS-COV-2 RNA RESP QL NAA+PROBE: NEGATIVE
SODIUM SERPL-SCNC: 130 MMOL/L (ref 133–144)
SODIUM SERPL-SCNC: 132 MMOL/L (ref 133–144)
SPECIMEN SOURCE: NORMAL
TACROLIMUS BLD-MCNC: 4.9 UG/L (ref 5–15)
TME LAST DOSE: ABNORMAL H
WBC # BLD AUTO: 15.7 10E9/L (ref 4–11)

## 2021-05-20 PROCEDURE — 250N000013 HC RX MED GY IP 250 OP 250 PS 637: Performed by: PHYSICIAN ASSISTANT

## 2021-05-20 PROCEDURE — 272N000001 HC OR GENERAL SUPPLY STERILE: Performed by: INTERNAL MEDICINE

## 2021-05-20 PROCEDURE — 02BM3ZX EXCISION OF VENTRICULAR SEPTUM, PERCUTANEOUS APPROACH, DIAGNOSTIC: ICD-10-PCS | Performed by: INTERNAL MEDICINE

## 2021-05-20 PROCEDURE — 250N000013 HC RX MED GY IP 250 OP 250 PS 637: Performed by: NURSE PRACTITIONER

## 2021-05-20 PROCEDURE — C1894 INTRO/SHEATH, NON-LASER: HCPCS | Performed by: INTERNAL MEDICINE

## 2021-05-20 PROCEDURE — 97140 MANUAL THERAPY 1/> REGIONS: CPT | Mod: GP | Performed by: PHYSICAL THERAPIST

## 2021-05-20 PROCEDURE — 88307 TISSUE EXAM BY PATHOLOGIST: CPT | Mod: TC | Performed by: INTERNAL MEDICINE

## 2021-05-20 PROCEDURE — 250N000013 HC RX MED GY IP 250 OP 250 PS 637: Performed by: STUDENT IN AN ORGANIZED HEALTH CARE EDUCATION/TRAINING PROGRAM

## 2021-05-20 PROCEDURE — 80076 HEPATIC FUNCTION PANEL: CPT | Performed by: PHYSICIAN ASSISTANT

## 2021-05-20 PROCEDURE — 250N000012 HC RX MED GY IP 250 OP 636 PS 637: Performed by: SURGERY

## 2021-05-20 PROCEDURE — 214N000001 HC R&B CCU UMMC

## 2021-05-20 PROCEDURE — 250N000011 HC RX IP 250 OP 636: Performed by: PHYSICIAN ASSISTANT

## 2021-05-20 PROCEDURE — 88350 IMFLUOR EA ADDL 1ANTB STN PX: CPT | Mod: 26 | Performed by: PATHOLOGY

## 2021-05-20 PROCEDURE — 88346 IMFLUOR 1ST 1ANTB STAIN PX: CPT | Mod: TC | Performed by: INTERNAL MEDICINE

## 2021-05-20 PROCEDURE — 250N000013 HC RX MED GY IP 250 OP 250 PS 637: Performed by: SURGERY

## 2021-05-20 PROCEDURE — 97110 THERAPEUTIC EXERCISES: CPT | Mod: GP | Performed by: PHYSICAL THERAPIST

## 2021-05-20 PROCEDURE — 36592 COLLECT BLOOD FROM PICC: CPT | Performed by: PHYSICIAN ASSISTANT

## 2021-05-20 PROCEDURE — 999N001017 HC STATISTIC GLUCOSE BY METER IP

## 2021-05-20 PROCEDURE — 250N000012 HC RX MED GY IP 250 OP 636 PS 637: Performed by: STUDENT IN AN ORGANIZED HEALTH CARE EDUCATION/TRAINING PROGRAM

## 2021-05-20 PROCEDURE — 97530 THERAPEUTIC ACTIVITIES: CPT | Mod: GP | Performed by: PHYSICAL THERAPIST

## 2021-05-20 PROCEDURE — 4A023N6 MEASUREMENT OF CARDIAC SAMPLING AND PRESSURE, RIGHT HEART, PERCUTANEOUS APPROACH: ICD-10-PCS | Performed by: INTERNAL MEDICINE

## 2021-05-20 PROCEDURE — U0003 INFECTIOUS AGENT DETECTION BY NUCLEIC ACID (DNA OR RNA); SEVERE ACUTE RESPIRATORY SYNDROME CORONAVIRUS 2 (SARS-COV-2) (CORONAVIRUS DISEASE [COVID-19]), AMPLIFIED PROBE TECHNIQUE, MAKING USE OF HIGH THROUGHPUT TECHNOLOGIES AS DESCRIBED BY CMS-2020-01-R: HCPCS

## 2021-05-20 PROCEDURE — 88307 TISSUE EXAM BY PATHOLOGIST: CPT | Mod: 26 | Performed by: PATHOLOGY

## 2021-05-20 PROCEDURE — U0005 INFEC AGEN DETEC AMPLI PROBE: HCPCS

## 2021-05-20 PROCEDURE — 83735 ASSAY OF MAGNESIUM: CPT | Performed by: PHYSICIAN ASSISTANT

## 2021-05-20 PROCEDURE — 85027 COMPLETE CBC AUTOMATED: CPT | Performed by: PHYSICIAN ASSISTANT

## 2021-05-20 PROCEDURE — 80197 ASSAY OF TACROLIMUS: CPT | Performed by: PHYSICIAN ASSISTANT

## 2021-05-20 PROCEDURE — 88346 IMFLUOR 1ST 1ANTB STAIN PX: CPT | Mod: 26 | Performed by: PATHOLOGY

## 2021-05-20 PROCEDURE — 250N000012 HC RX MED GY IP 250 OP 636 PS 637: Performed by: PHYSICIAN ASSISTANT

## 2021-05-20 PROCEDURE — 97116 GAIT TRAINING THERAPY: CPT | Mod: GP | Performed by: PHYSICAL THERAPIST

## 2021-05-20 PROCEDURE — 93505 ENDOMYOCARDIAL BIOPSY: CPT | Performed by: INTERNAL MEDICINE

## 2021-05-20 PROCEDURE — 80197 ASSAY OF TACROLIMUS: CPT | Performed by: NURSE PRACTITIONER

## 2021-05-20 PROCEDURE — 250N000009 HC RX 250: Performed by: INTERNAL MEDICINE

## 2021-05-20 PROCEDURE — 80048 BASIC METABOLIC PNL TOTAL CA: CPT | Performed by: PHYSICIAN ASSISTANT

## 2021-05-20 PROCEDURE — 86140 C-REACTIVE PROTEIN: CPT | Performed by: PHYSICIAN ASSISTANT

## 2021-05-20 PROCEDURE — 88350 IMFLUOR EA ADDL 1ANTB STN PX: CPT | Mod: TC | Performed by: INTERNAL MEDICINE

## 2021-05-20 PROCEDURE — 250N000011 HC RX IP 250 OP 636: Performed by: NURSE PRACTITIONER

## 2021-05-20 PROCEDURE — 99232 SBSQ HOSP IP/OBS MODERATE 35: CPT | Mod: 25 | Performed by: PHYSICIAN ASSISTANT

## 2021-05-20 RX ADMIN — ALLOPURINOL 300 MG: 300 TABLET ORAL at 08:38

## 2021-05-20 RX ADMIN — NYSTATIN 1000000 UNITS: 500000 SUSPENSION ORAL at 19:50

## 2021-05-20 RX ADMIN — ACETAMINOPHEN 975 MG: 325 TABLET, FILM COATED ORAL at 15:08

## 2021-05-20 RX ADMIN — SENNOSIDES AND DOCUSATE SODIUM 1 TABLET: 8.6; 5 TABLET ORAL at 19:49

## 2021-05-20 RX ADMIN — Medication 50 MG: at 19:57

## 2021-05-20 RX ADMIN — TACROLIMUS 3 MG: 1 CAPSULE ORAL at 18:00

## 2021-05-20 RX ADMIN — PREDNISONE 20 MG: 20 TABLET ORAL at 19:50

## 2021-05-20 RX ADMIN — BUPROPION HYDROCHLORIDE 100 MG: 100 TABLET, EXTENDED RELEASE ORAL at 08:35

## 2021-05-20 RX ADMIN — HEPARIN SODIUM 5000 UNITS: 5000 INJECTION, SOLUTION INTRAVENOUS; SUBCUTANEOUS at 06:50

## 2021-05-20 RX ADMIN — HYDROMORPHONE HYDROCHLORIDE 4 MG: 2 TABLET ORAL at 15:15

## 2021-05-20 RX ADMIN — NYSTATIN 1000000 UNITS: 500000 SUSPENSION ORAL at 15:15

## 2021-05-20 RX ADMIN — LACTULOSE 20 G: 20 SOLUTION ORAL at 08:50

## 2021-05-20 RX ADMIN — TACROLIMUS 3 MG: 1 CAPSULE ORAL at 08:35

## 2021-05-20 RX ADMIN — INSULIN GLARGINE 25 UNITS: 100 INJECTION, SOLUTION SUBCUTANEOUS at 08:47

## 2021-05-20 RX ADMIN — Medication 37.5 MG: at 22:32

## 2021-05-20 RX ADMIN — NYSTATIN 1000000 UNITS: 500000 SUSPENSION ORAL at 12:44

## 2021-05-20 RX ADMIN — GABAPENTIN 600 MG: 300 CAPSULE ORAL at 22:32

## 2021-05-20 RX ADMIN — MYCOPHENOLATE MOFETIL 1500 MG: 250 CAPSULE ORAL at 19:49

## 2021-05-20 RX ADMIN — GABAPENTIN 600 MG: 300 CAPSULE ORAL at 15:08

## 2021-05-20 RX ADMIN — Medication 10 MG: at 22:33

## 2021-05-20 RX ADMIN — SODIUM CHLORIDE, PRESERVATIVE FREE 5 ML: 5 INJECTION INTRAVENOUS at 17:42

## 2021-05-20 RX ADMIN — HEPARIN SODIUM 5000 UNITS: 5000 INJECTION, SOLUTION INTRAVENOUS; SUBCUTANEOUS at 22:33

## 2021-05-20 RX ADMIN — PANTOPRAZOLE SODIUM 40 MG: 40 TABLET, DELAYED RELEASE ORAL at 08:38

## 2021-05-20 RX ADMIN — FLUTICASONE FUROATE AND VILANTEROL TRIFENATATE 1 PUFF: 100; 25 POWDER RESPIRATORY (INHALATION) at 08:40

## 2021-05-20 RX ADMIN — DAPSONE 50 MG: 25 TABLET ORAL at 08:36

## 2021-05-20 RX ADMIN — ACETAMINOPHEN 975 MG: 325 TABLET, FILM COATED ORAL at 06:50

## 2021-05-20 RX ADMIN — SODIUM CHLORIDE, PRESERVATIVE FREE 5 ML: 5 INJECTION INTRAVENOUS at 07:54

## 2021-05-20 RX ADMIN — MYCOPHENOLATE MOFETIL 1500 MG: 250 CAPSULE ORAL at 08:35

## 2021-05-20 RX ADMIN — ROSUVASTATIN CALCIUM 10 MG: 10 TABLET, FILM COATED ORAL at 08:36

## 2021-05-20 RX ADMIN — Medication 1 TABLET: at 08:39

## 2021-05-20 RX ADMIN — BUMETANIDE 4 MG: 0.25 INJECTION INTRAMUSCULAR; INTRAVENOUS at 08:46

## 2021-05-20 RX ADMIN — Medication 10 ML: at 15:07

## 2021-05-20 RX ADMIN — Medication 1 TABLET: at 19:50

## 2021-05-20 RX ADMIN — NYSTATIN 1000000 UNITS: 500000 SUSPENSION ORAL at 08:50

## 2021-05-20 RX ADMIN — INSULIN ASPART 5 UNITS: 100 INJECTION, SOLUTION INTRAVENOUS; SUBCUTANEOUS at 18:00

## 2021-05-20 RX ADMIN — BUPROPION HYDROCHLORIDE 100 MG: 100 TABLET, EXTENDED RELEASE ORAL at 19:49

## 2021-05-20 RX ADMIN — UMECLIDINIUM 1 PUFF: 62.5 AEROSOL, POWDER ORAL at 08:40

## 2021-05-20 RX ADMIN — ACETAMINOPHEN 975 MG: 325 TABLET, FILM COATED ORAL at 08:38

## 2021-05-20 RX ADMIN — ASPIRIN 81 MG CHEWABLE TABLET 81 MG: 81 TABLET CHEWABLE at 08:36

## 2021-05-20 RX ADMIN — VALGANCICLOVIR 450 MG: 450 TABLET, FILM COATED ORAL at 08:37

## 2021-05-20 RX ADMIN — MONTELUKAST 10 MG: 10 TABLET, FILM COATED ORAL at 22:33

## 2021-05-20 RX ADMIN — HEPARIN SODIUM 5000 UNITS: 5000 INJECTION, SOLUTION INTRAVENOUS; SUBCUTANEOUS at 15:08

## 2021-05-20 RX ADMIN — PREDNISONE 20 MG: 20 TABLET ORAL at 08:36

## 2021-05-20 RX ADMIN — GABAPENTIN 300 MG: 300 CAPSULE ORAL at 08:36

## 2021-05-20 ASSESSMENT — ACTIVITIES OF DAILY LIVING (ADL)
ADLS_ACUITY_SCORE: 12

## 2021-05-20 ASSESSMENT — MIFFLIN-ST. JEOR: SCORE: 1862.67

## 2021-05-20 NOTE — PLAN OF CARE
D: Admitted 2/8 for decompensated heart failure with subsequent s/p OHT 5/6/21. Hx. NICM EF 20% c/b VT s/p CRT-D s/p HMII LVAD 6/19/2017 originally intended as destination therapy 2/2 obesity however with recent weight loss became candidate for transplantation.  I/A: A&Ox4. VSSA on RA/Cpap at HS. RESENDIZ. SR/ST 90s-100s. C/o incx pain partially relieved by prn dilaudid. R pleural CT to -10 cm H20 suxn, w/minimal serous drainage, +air leak, drsg CDI. BG covered with sliding scale and carbs covered. Adequate uop. SBA w/walker. PRN melatonin given at HS. Appeared to rest comfortably overnight.   P: NPO for RHC/HBx today. Continue to monitor and notify CVTS with questions/concerns.

## 2021-05-20 NOTE — PROGRESS NOTES
Patient was received AO X 4. Tolerated all scheduled medications and care with no distress noted. Denies Pain. No additional output received from chest tube. No urination, no BM obtained. Patient is stable at this time and remain on continue care monitoring.

## 2021-05-20 NOTE — PROGRESS NOTES
Pt back from [facility] with biopsy. Pt alert and oriented. Vitals taken and WNL. RIJ site WDL, no hematoma, no bleed.     Temp: 97.5  F (36.4  C) Temp src: Oral BP: 139/79 Pulse: 91   Resp: 18 SpO2: 96 % O2 Device: None (Room air)

## 2021-05-20 NOTE — PROGRESS NOTES
[facility]   Cardiology II Service / Advanced Heart Failure  Daily Progress Note        Patient: Jim Willingham  MRN: [id number]  Admission Date: 2/8/2021  Hospital Day # 101    Assessment and Plan: Jim Willingham is a 65 year old male with history of NICM EF 20% c/b VT s/p CRT-D s/p HMII LVAD 6/19/2017 originally intended as destination therapy 2/2 obesity however with recent weight loss now candidate for transplantation. He is admitted for decompensated heart failure with subsequent s/p OHT 5/6/21.     Recommendations today:   - RHC and Biopsy today (2 weeks)  - Currently NPO for procedure  - Bumex 4 mg IV BID pending RHC: addendum: holding afternoon bumex, restarting at reduced dose tomorrow  - Continue to monitor off abx at this point  - Continue Tac 3 mg po BID, continue daily levels  - Chest tube management per CVTS    S/p OHT. 5/6/21 secondary to NICM. Echo 5/13/21 conistent with EF 65-70%, mildly decreased RV function, and dilated IVC. RHC 5/13/21 with mRA-5, RV-30/5, mPA-20, mPCWP-10, MICHELLE CO-5.8, and MICHELLE CI-2.63, and SVR-914.  RHC 5/20 pending.  Immunosuppression: Simulect induction 5/6 and 5/10. Tac increased to 3 mg po BID, level 6.8. Goal 10-12. Prednisone taper. Cellcept 1500 mg po BID.   Serostatus: CMV: D?, R+, EBV: R+, Toxo R-  Prophylaxis: Dapsone, Nystatin, and Valcyte (3-6 months pending donor status).   - Increased bumex to 4 mg IV BID  - Continue Crestor and ASA.   - DSA 5/13 negative  - 5/13 NER, 5/20 pendig    Leukocytosis. No consolidation on CXR. No symptoms. Has been afebrile. U/A bland. CRP is low although note his significant immunosuppression. Chest/Abdomen/Pelvis CT with a left lower lobe consolidation, no current symptoms (scant dry cough), WBC improving off antibiotics, but would consider this as potential source in the future if not resolving.  - WBC 15.7 (16.7)  - Continue to monitor off abx at this point  - Blood culture NGTD  - Would Check C. Diff if diarrhea  "reoccurs    Right Apical Pneumothorax.   - Management per CVTS.     ================================================================  Interval History/ROS:  He denies fever, chills, chest pain, palpitations, cough, diarrhea. LE edema about the same as yesterday. No abdominal pain, nausea or vomiting.  No diarrhea. No issues or drainage from his surgical sites.    Last 24 hr care team notes reviewed.   ROS:  4 point ROS including Respiratory, CV, GI and , other than that noted in the HPI, is negative.     Medications: Reviewed in EPIC.     Physical Exam:   /81 (BP Location: Right arm)   Pulse 93   Temp 97.7  F (36.5  C) (Oral)   Resp 16   Ht 1.727 m (5' 8\")   Wt 109.3 kg (241 lb)   SpO2 98%   BMI 36.64 kg/m    GENERAL: Appears alert and interacting appropriately.   HEENT: Eye symmetrical and free of discharge bilaterally. Mucous membranes moist and without lesions.  NECK: Supple and without lymphadenopathy. JVD below clavicular line.    CV: RRR, S1S2 present without murmur, rub, and gallop.   RESPIRATORY: Respirations regular, even, and unlabored. Lungs CTA throughout.   GI: Soft and non distended with normoactive bowel sounds present in all quadrants. No tenderness, rebound, guarding. No organomegaly.   EXTREMITIES: 1+ bilateral LE peripheral edema. 2+ bilateral pedal pulses.   NEUROLOGIC: Alert and interacting appropriatly. No focal deficits.   MUSCULOSKELETAL: No joint swelling or tenderness.   SKIN: No jaundice. No rashes or lesions.     Data:  CMP  Recent Labs   Lab 05/20/21  0803 05/19/21  1750 05/19/21  0543 05/18/21  1704 05/17/21  0530 05/17/21  0530 05/14/21  0521 05/14/21  0521   * 130* 130* 131*   < > 131*   < > 132*   POTASSIUM 4.2 4.3 4.4 4.3   < > 4.4   < > 4.5   CHLORIDE 98 96 97 97   < > 97   < > 98   CO2 29 29 25 28   < > 29   < > 29   ANIONGAP 4 6 8 6   < > 5   < > 5   * 103* 181* 158*   < > 194*   < > 177*   BUN 64* 61* 57* 55*   < > 48*   < > 40*   CR 2.03* 1.76* 1.73* " 1.91*   < > 1.78*   < > 1.33*   GFRESTIMATED 34* 40* 41* 36*   < > 39*   < > 56*   GFRESTBLACK 39* 46* 47* 42*   < > 46*   < > 65   QAMAR 7.9* 7.8* 8.0* 7.6*   < > 7.6*   < > 8.1*   MAG 2.1 2.0 2.1  --   --  2.1  --  1.9   PROTTOTAL 5.3*  --   --   --   --  4.9*  --  4.7*   ALBUMIN 2.8*  --   --   --   --  2.5*  --  2.5*   BILITOTAL 0.5  --   --   --   --  0.4  --  0.6   ALKPHOS 102  --   --   --   --  80  --  70   AST 28  --   --   --   --  24  --  32   ALT 46  --   --   --   --  44  --  47    < > = values in this interval not displayed.     CBC  Recent Labs   Lab 05/20/21  0803 05/19/21  0543 05/18/21  0746 05/17/21  0530   WBC 15.7* 16.7* 17.3* 18.7*   RBC 2.73* 2.61* 2.69* 2.50*   HGB 8.4* 8.0* 8.3* 7.8*   HCT 26.5* 25.2* 25.7* 24.5*   MCV 97 97 96 98   MCH 30.8 30.7 30.9 31.2   MCHC 31.7 31.7 32.3 31.8   RDW 16.7* 15.9* 15.7* 15.3*    278 318 292     INR  No lab results found in last 7 days.    Patient discussed with Dr. Seth.      Didi Butler, PA-C  [facility]

## 2021-05-20 NOTE — PLAN OF CARE
6C OT- attempted pt this AM, pt declined in anticipation for RHC but agreeable to OT this afternoon. Will check back pending therapist's schedule.

## 2021-05-20 NOTE — PROGRESS NOTES
Cardiovascular Surgery Progress Note  05/20/2021         Assessment and Plan:     Mr. Jim Willingham is a 65 year old male with history of NICM EF 20% c/b VT s/p CRT-D s/p HMII LVAD 6/19/2017 originally intended as destination therapy 2/2 obesity however with recent weight loss now candidate for transplantation. He was admitted 2/8/21 for worsening functional status and renal function concerning for worsening heart failure. He was eventually listed status 2E for transplant and received a donor offer.   Jim is now s/p orthotopic heart transplant on 5/6/21 with Dr. Ronnie Quigley. He was shocked > 2x received amio, mag and lidocaine while coming off bypass. Known lung injury with isolated air leak to right pleural.      Cardiovascular:   Hx of NICM EF 20%, VT (ICD)  Hx HeartMate II LVAD 6/19/2017  Cardiorenal syndrome   S/P orthotopic heart transplant   No arrhythmia, HD stable.  Most recent echo showed LV EF 65-70% on 5/9/21.  Basiliximab 5/10.  Terbutaline weaned to off.    ASA 81 mg, Crestor 10 mg daily.   Diuresis with Bumex 4 mg IV BID, Cards II following.   Chest tubes: Airleak isolated to Right pleural. Had some CP after walking off suction 5/13 with associated subcutaneous emphysema, needed portable suction while ambulating. Can now ambulate OFF suction 5/19. Keep to -10 mmHg while in room to reduce subcutaneous emphysema from getting worse. Very slow intermittent air leak 5/19, will do clamping trial before removing (when output improves). Output 320 ml 5/20.  TPW: removed.   - Lymphedema wraps 5/18       Pulmonary:  Hx COPD and CECILIA on CPAP  Air leak from Right pleural tube (unintended injury during surgery)   - PTA Breo ellipta, Incruse ellipta, and Singulair   - Extubated POD 1 to 4 lpm via NC. Now saturating well on RA.   - Air Leak seems to be improving, CXR 5/15 on waterseal   - Pulm toilet, IS, activity and deep breathing  - R pleural fluid Triglycerides normal on 5/17 (first day of increased  drainage)     Neurology / MSK:  Hx Depression  - PTA bupropion and amitriptyline  Post-op Status  - Neuro intact  - Acute post-operative pain well controlled with acetaminophen, PO dilaudid PRN, IV dilaudid PRN  R Hip Pain   - Complaining of R hip pain increasing over past 2 days, keeping him from raising legs into bed independently. Pain with flexion and abduction. Hip Xray 5/16 without acute concerns, try Ice.       / Renal:  WALTER on CKD stage III   - Cardiorenal syndrome improving. Most recent creatinine 2.03>1.76>1.73 and trending up, adequate UOP.   - Day before surgery weight 102.3 kg. WT: 109.3 kg today     GI / FEN:   Hx Sylvester-en-Y gastric bypass  Non-severe Mild Protein-Calorie Malnutrition   - Regular diet with supplements, continue bowel regimen  - Replace electrolytes as needed, hepatic enzymes WNL.  - +BM 5/18 with lactulose. Will continue for a few days per patient request. Declined 5/18.     Endocrine:  DMT2  Gout   Stress induced hyperglycemia initially managed on insulin drip postop, transitioned to NPH BID and sliding scale. Converted to Lantus 5/14 am, continue prandial and high resistance sliding corrective scale.   - PTA allopurinol.      Infectious Disease:  Stress induced leukocytosis  - WBC 15.7, remains afebrile, no signs or symptoms of infection. UA clean 5/15.   - Completed perioperative antibiotics.   - Persistent elevated WBC, CT Ch/ Ad/ Pv 5/18.      Hematology:   Acute blood loss anemia and thrombocytopenia  Hgb 8.4, Plts WNL, no signs or symptoms of active bleeding     Anticoagulation:   - ASA only     Prophylaxis:   - Stress ulcer prophylaxis: Pantoprazole 40 mg daily for 30 days  - DVT prophylaxis: Subcutaneous heparin, SCD     Disposition:   - Transferred to  on 5/10   - Therapies recommending discharge to Home vs ARU    Discussed with CVTS Fellow as needed.  Staff surgeons have been informed of changes through both verbal and written communication.      [place]  "MICHEL Perez  Cardiothoracic Surgery  [phone]          Interval History:     No overnight events.   States pain is well managed on current regimen. Slept well overnight.  Tolerating diet but not very hungry yet, is passing flatus, + BM yesterday. No nausea or vomiting.  Breathing well without complaints with still some cough  Working with therapies and ambulating in halls with assistance.   Denies chest pain, palpitations, dizziness, syncopal symptoms, fevers, chills, myalgias, or sternal popping/clicking.         Physical Exam:   Blood pressure 128/81, pulse 93, temperature 97.7  F (36.5  C), temperature source Oral, resp. rate 16, height 1.727 m (5' 8\"), weight 109.3 kg (241 lb), SpO2 98 %.  Vitals:    05/17/21 1230 05/18/21 0534 05/20/21 0649   Weight: 108.4 kg (239 lb) 108.9 kg (240 lb) 109.3 kg (241 lb)      Weight; + 6 kg since surgeryand trending up x 2 days.   24 hr Fluid status; net loss 1 L.   MAPs: 78 - 83     Gen: A&Ox4, NAD  Neuro: no focal deficits   CV: RRR, normal S1 S2, no murmurs, rubs or gallops.   Pulm: CTA, no wheezing or rhonchi, normal breathing on RA  Abd: nondistended, normal BS, soft, nontender  Ext: moderate peripheral edema, 2+ pitting, legs wrapped   Incision: clean, dry, intact, no erythema, sternum stable  Tubes/drain sites: dressing clean and dry. Chest tube 320 ml/24 hrs. Small air leak,leave another day.         Data:    Imaging:  reviewed recent imaging, no acute concerns, residual subcutaneous emphysema     Labs:  BMP  Recent Labs   Lab 05/20/21  0803 05/19/21  1750 05/19/21  0543 05/18/21  1704   * 130* 130* 131*   POTASSIUM 4.2 4.3 4.4 4.3   CHLORIDE 98 96 97 97   QAMAR 7.9* 7.8* 8.0* 7.6*   CO2 29 29 25 28   BUN 64* 61* 57* 55*   CR 2.03* 1.76* 1.73* 1.91*   * 103* 181* 158*     CBC  Recent Labs   Lab 05/20/21  0803 05/19/21  0543 05/18/21  0746 05/17/21  0530   WBC 15.7* 16.7* 17.3* 18.7*   RBC 2.73* 2.61* 2.69* 2.50*   HGB 8.4* 8.0* 8.3* 7.8*   HCT 26.5* 25.2* " 25.7* 24.5*   MCV 97 97 96 98   MCH 30.8 30.7 30.9 31.2   MCHC 31.7 31.7 32.3 31.8   RDW 16.7* 15.9* 15.7* 15.3*    278 318 292     INR  No lab results found in last 7 days.   Hepatic Panel  Recent Labs   Lab 05/20/21  0803 05/17/21  0530 05/14/21  0521   AST 28 24 32   ALT 46 44 47   ALKPHOS 102 80 70   BILITOTAL 0.5 0.4 0.6   ALBUMIN 2.8* 2.5* 2.5*     GLUCOSE:   Recent Labs   Lab 05/20/21  0803 05/20/21  0724 05/20/21  0437 05/19/21  2328 05/19/21  1750 05/19/21  1630 05/19/21  1207 05/19/21  0741 05/19/21  0543 05/18/21  1704 05/18/21  1704 05/18/21  0746 05/18/21  0746 05/17/21  0530 05/17/21  0530   *  --   --   --  103*  --   --   --  181*  --  158*  --  180*  --  194*   BGM  --  133* 147* 205*  --  125* 190* 175*  --    < >  --    < >  --    < >  --     < > = values in this interval not displayed.

## 2021-05-21 ENCOUNTER — APPOINTMENT (OUTPATIENT)
Dept: PHYSICAL THERAPY | Facility: CLINIC | Age: 66
DRG: 001 | End: 2021-05-21
Attending: INTERNAL MEDICINE
Payer: COMMERCIAL

## 2021-05-21 ENCOUNTER — APPOINTMENT (OUTPATIENT)
Dept: GENERAL RADIOLOGY | Facility: CLINIC | Age: 66
DRG: 001 | End: 2021-05-21
Attending: NURSE PRACTITIONER
Payer: COMMERCIAL

## 2021-05-21 ENCOUNTER — TELEPHONE (OUTPATIENT)
Dept: TRANSPLANT | Facility: CLINIC | Age: 66
End: 2021-05-21

## 2021-05-21 LAB
ANION GAP SERPL CALCULATED.3IONS-SCNC: 6 MMOL/L (ref 3–14)
ANION GAP SERPL CALCULATED.3IONS-SCNC: 6 MMOL/L (ref 3–14)
BACTERIA SPEC CULT: NO GROWTH
BUN SERPL-MCNC: 59 MG/DL (ref 7–30)
BUN SERPL-MCNC: 59 MG/DL (ref 7–30)
CALCIUM SERPL-MCNC: 7.4 MG/DL (ref 8.5–10.1)
CALCIUM SERPL-MCNC: 8 MG/DL (ref 8.5–10.1)
CHLORIDE SERPL-SCNC: 102 MMOL/L (ref 94–109)
CHLORIDE SERPL-SCNC: 99 MMOL/L (ref 94–109)
CO2 SERPL-SCNC: 25 MMOL/L (ref 20–32)
CO2 SERPL-SCNC: 27 MMOL/L (ref 20–32)
COPATH REPORT: NORMAL
CREAT SERPL-MCNC: 1.74 MG/DL (ref 0.66–1.25)
CREAT SERPL-MCNC: 1.83 MG/DL (ref 0.66–1.25)
ERYTHROCYTE [DISTWIDTH] IN BLOOD BY AUTOMATED COUNT: 16.8 % (ref 10–15)
GFR SERPL CREATININE-BSD FRML MDRD: 38 ML/MIN/{1.73_M2}
GFR SERPL CREATININE-BSD FRML MDRD: 41 ML/MIN/{1.73_M2}
GLUCOSE BLDC GLUCOMTR-MCNC: 131 MG/DL (ref 70–99)
GLUCOSE BLDC GLUCOMTR-MCNC: 160 MG/DL (ref 70–99)
GLUCOSE BLDC GLUCOMTR-MCNC: 170 MG/DL (ref 70–99)
GLUCOSE BLDC GLUCOMTR-MCNC: 178 MG/DL (ref 70–99)
GLUCOSE BLDC GLUCOMTR-MCNC: 179 MG/DL (ref 70–99)
GLUCOSE SERPL-MCNC: 172 MG/DL (ref 70–99)
GLUCOSE SERPL-MCNC: 229 MG/DL (ref 70–99)
HCT VFR BLD AUTO: 25.7 % (ref 40–53)
HGB BLD-MCNC: 8 G/DL (ref 13.3–17.7)
MAGNESIUM SERPL-MCNC: 1.9 MG/DL (ref 1.6–2.3)
MAGNESIUM SERPL-MCNC: 2.1 MG/DL (ref 1.6–2.3)
MCH RBC QN AUTO: 31 PG (ref 26.5–33)
MCHC RBC AUTO-ENTMCNC: 31.1 G/DL (ref 31.5–36.5)
MCV RBC AUTO: 100 FL (ref 78–100)
PLATELET # BLD AUTO: 275 10E9/L (ref 150–450)
POTASSIUM SERPL-SCNC: 4.1 MMOL/L (ref 3.4–5.3)
POTASSIUM SERPL-SCNC: 4.2 MMOL/L (ref 3.4–5.3)
RBC # BLD AUTO: 2.58 10E12/L (ref 4.4–5.9)
SODIUM SERPL-SCNC: 133 MMOL/L (ref 133–144)
SODIUM SERPL-SCNC: 133 MMOL/L (ref 133–144)
SPECIMEN SOURCE: NORMAL
TACROLIMUS BLD-MCNC: 4.5 UG/L (ref 5–15)
TME LAST DOSE: ABNORMAL H
WBC # BLD AUTO: 12.9 10E9/L (ref 4–11)

## 2021-05-21 PROCEDURE — 250N000013 HC RX MED GY IP 250 OP 250 PS 637: Performed by: NURSE PRACTITIONER

## 2021-05-21 PROCEDURE — 97140 MANUAL THERAPY 1/> REGIONS: CPT | Mod: GP | Performed by: PHYSICAL THERAPIST

## 2021-05-21 PROCEDURE — 250N000012 HC RX MED GY IP 250 OP 636 PS 637: Performed by: STUDENT IN AN ORGANIZED HEALTH CARE EDUCATION/TRAINING PROGRAM

## 2021-05-21 PROCEDURE — 71046 X-RAY EXAM CHEST 2 VIEWS: CPT | Mod: 26 | Performed by: RADIOLOGY

## 2021-05-21 PROCEDURE — 85027 COMPLETE CBC AUTOMATED: CPT | Performed by: PHYSICIAN ASSISTANT

## 2021-05-21 PROCEDURE — 250N000012 HC RX MED GY IP 250 OP 636 PS 637: Performed by: PHYSICIAN ASSISTANT

## 2021-05-21 PROCEDURE — 250N000013 HC RX MED GY IP 250 OP 250 PS 637: Performed by: STUDENT IN AN ORGANIZED HEALTH CARE EDUCATION/TRAINING PROGRAM

## 2021-05-21 PROCEDURE — 97110 THERAPEUTIC EXERCISES: CPT | Mod: GP | Performed by: PHYSICAL THERAPIST

## 2021-05-21 PROCEDURE — 250N000012 HC RX MED GY IP 250 OP 636 PS 637: Performed by: NURSE PRACTITIONER

## 2021-05-21 PROCEDURE — 250N000011 HC RX IP 250 OP 636: Performed by: PHYSICIAN ASSISTANT

## 2021-05-21 PROCEDURE — 97530 THERAPEUTIC ACTIVITIES: CPT | Mod: GP | Performed by: PHYSICAL THERAPIST

## 2021-05-21 PROCEDURE — 250N000013 HC RX MED GY IP 250 OP 250 PS 637: Performed by: PHYSICIAN ASSISTANT

## 2021-05-21 PROCEDURE — 250N000011 HC RX IP 250 OP 636: Performed by: NURSE PRACTITIONER

## 2021-05-21 PROCEDURE — 80048 BASIC METABOLIC PNL TOTAL CA: CPT | Performed by: PHYSICIAN ASSISTANT

## 2021-05-21 PROCEDURE — 99232 SBSQ HOSP IP/OBS MODERATE 35: CPT | Performed by: NURSE PRACTITIONER

## 2021-05-21 PROCEDURE — 83735 ASSAY OF MAGNESIUM: CPT | Performed by: PHYSICIAN ASSISTANT

## 2021-05-21 PROCEDURE — 214N000001 HC R&B CCU UMMC

## 2021-05-21 PROCEDURE — 97116 GAIT TRAINING THERAPY: CPT | Mod: GP | Performed by: PHYSICAL THERAPIST

## 2021-05-21 PROCEDURE — 36592 COLLECT BLOOD FROM PICC: CPT | Performed by: PHYSICIAN ASSISTANT

## 2021-05-21 PROCEDURE — 999N001017 HC STATISTIC GLUCOSE BY METER IP

## 2021-05-21 PROCEDURE — 71046 X-RAY EXAM CHEST 2 VIEWS: CPT

## 2021-05-21 PROCEDURE — 250N000012 HC RX MED GY IP 250 OP 636 PS 637: Performed by: SURGERY

## 2021-05-21 PROCEDURE — 250N000013 HC RX MED GY IP 250 OP 250 PS 637: Performed by: SURGERY

## 2021-05-21 PROCEDURE — 80197 ASSAY OF TACROLIMUS: CPT | Performed by: NURSE PRACTITIONER

## 2021-05-21 RX ORDER — DAPSONE 25 MG/1
100 TABLET ORAL DAILY
Status: DISCONTINUED | OUTPATIENT
Start: 2021-05-22 | End: 2021-05-28

## 2021-05-21 RX ORDER — TACROLIMUS 1 MG/1
3 CAPSULE ORAL
Status: DISCONTINUED | OUTPATIENT
Start: 2021-05-22 | End: 2021-05-24

## 2021-05-21 RX ORDER — TACROLIMUS 1 MG/1
4 CAPSULE ORAL EVERY EVENING
Status: DISCONTINUED | OUTPATIENT
Start: 2021-05-21 | End: 2021-05-24

## 2021-05-21 RX ADMIN — HYDROMORPHONE HYDROCHLORIDE 4 MG: 2 TABLET ORAL at 07:24

## 2021-05-21 RX ADMIN — BUMETANIDE 3 MG: 2 TABLET ORAL at 07:59

## 2021-05-21 RX ADMIN — VALGANCICLOVIR 450 MG: 450 TABLET, FILM COATED ORAL at 08:00

## 2021-05-21 RX ADMIN — DOCUSATE SODIUM 50 MG AND SENNOSIDES 8.6 MG 2 TABLET: 8.6; 5 TABLET, FILM COATED ORAL at 08:36

## 2021-05-21 RX ADMIN — NYSTATIN 1000000 UNITS: 500000 SUSPENSION ORAL at 15:42

## 2021-05-21 RX ADMIN — HEPARIN SODIUM 5000 UNITS: 5000 INJECTION, SOLUTION INTRAVENOUS; SUBCUTANEOUS at 15:43

## 2021-05-21 RX ADMIN — BUMETANIDE 3 MG: 2 TABLET ORAL at 15:43

## 2021-05-21 RX ADMIN — HYDROMORPHONE HYDROCHLORIDE 4 MG: 2 TABLET ORAL at 13:39

## 2021-05-21 RX ADMIN — TACROLIMUS 3 MG: 1 CAPSULE ORAL at 07:59

## 2021-05-21 RX ADMIN — NYSTATIN 1000000 UNITS: 500000 SUSPENSION ORAL at 08:00

## 2021-05-21 RX ADMIN — ASPIRIN 81 MG CHEWABLE TABLET 81 MG: 81 TABLET CHEWABLE at 07:58

## 2021-05-21 RX ADMIN — MONTELUKAST 10 MG: 10 TABLET, FILM COATED ORAL at 21:38

## 2021-05-21 RX ADMIN — FLUTICASONE FUROATE AND VILANTEROL TRIFENATATE 1 PUFF: 100; 25 POWDER RESPIRATORY (INHALATION) at 08:05

## 2021-05-21 RX ADMIN — SENNOSIDES AND DOCUSATE SODIUM 1 TABLET: 8.6; 5 TABLET ORAL at 20:05

## 2021-05-21 RX ADMIN — PREDNISONE 20 MG: 20 TABLET ORAL at 07:59

## 2021-05-21 RX ADMIN — PREDNISONE 20 MG: 20 TABLET ORAL at 20:06

## 2021-05-21 RX ADMIN — UMECLIDINIUM 1 PUFF: 62.5 AEROSOL, POWDER ORAL at 08:04

## 2021-05-21 RX ADMIN — GABAPENTIN 600 MG: 300 CAPSULE ORAL at 13:33

## 2021-05-21 RX ADMIN — INSULIN ASPART 4 UNITS: 100 INJECTION, SOLUTION INTRAVENOUS; SUBCUTANEOUS at 16:03

## 2021-05-21 RX ADMIN — GABAPENTIN 600 MG: 300 CAPSULE ORAL at 21:38

## 2021-05-21 RX ADMIN — Medication 10 MG: at 21:38

## 2021-05-21 RX ADMIN — Medication 1 TABLET: at 20:06

## 2021-05-21 RX ADMIN — NYSTATIN 1000000 UNITS: 500000 SUSPENSION ORAL at 13:00

## 2021-05-21 RX ADMIN — MYCOPHENOLATE MOFETIL 1500 MG: 250 CAPSULE ORAL at 07:58

## 2021-05-21 RX ADMIN — Medication 37.5 MG: at 21:38

## 2021-05-21 RX ADMIN — HEPARIN SODIUM 5000 UNITS: 5000 INJECTION, SOLUTION INTRAVENOUS; SUBCUTANEOUS at 08:04

## 2021-05-21 RX ADMIN — TACROLIMUS 4 MG: 1 CAPSULE ORAL at 17:56

## 2021-05-21 RX ADMIN — Medication 50 MG: at 21:39

## 2021-05-21 RX ADMIN — INSULIN ASPART 4 UNITS: 100 INJECTION, SOLUTION INTRAVENOUS; SUBCUTANEOUS at 20:02

## 2021-05-21 RX ADMIN — ACETAMINOPHEN 975 MG: 325 TABLET, FILM COATED ORAL at 15:43

## 2021-05-21 RX ADMIN — ALLOPURINOL 300 MG: 300 TABLET ORAL at 08:00

## 2021-05-21 RX ADMIN — ROSUVASTATIN CALCIUM 10 MG: 10 TABLET, FILM COATED ORAL at 07:59

## 2021-05-21 RX ADMIN — GABAPENTIN 300 MG: 300 CAPSULE ORAL at 08:00

## 2021-05-21 RX ADMIN — Medication 10 ML: at 13:33

## 2021-05-21 RX ADMIN — Medication 1 TABLET: at 08:01

## 2021-05-21 RX ADMIN — NYSTATIN 1000000 UNITS: 500000 SUSPENSION ORAL at 20:05

## 2021-05-21 RX ADMIN — MYCOPHENOLATE MOFETIL 1500 MG: 250 CAPSULE ORAL at 20:05

## 2021-05-21 RX ADMIN — POLYETHYLENE GLYCOL 3350 17 G: 17 POWDER, FOR SOLUTION ORAL at 08:05

## 2021-05-21 RX ADMIN — INSULIN ASPART 3 UNITS: 100 INJECTION, SOLUTION INTRAVENOUS; SUBCUTANEOUS at 09:47

## 2021-05-21 RX ADMIN — INSULIN GLARGINE 25 UNITS: 100 INJECTION, SOLUTION SUBCUTANEOUS at 08:05

## 2021-05-21 RX ADMIN — BUPROPION HYDROCHLORIDE 100 MG: 100 TABLET, EXTENDED RELEASE ORAL at 07:59

## 2021-05-21 RX ADMIN — PANTOPRAZOLE SODIUM 40 MG: 40 TABLET, DELAYED RELEASE ORAL at 07:59

## 2021-05-21 RX ADMIN — DAPSONE 50 MG: 25 TABLET ORAL at 08:01

## 2021-05-21 RX ADMIN — BUPROPION HYDROCHLORIDE 100 MG: 100 TABLET, EXTENDED RELEASE ORAL at 20:06

## 2021-05-21 RX ADMIN — ACETAMINOPHEN 975 MG: 325 TABLET, FILM COATED ORAL at 08:00

## 2021-05-21 ASSESSMENT — ACTIVITIES OF DAILY LIVING (ADL)
ADLS_ACUITY_SCORE: 14

## 2021-05-21 NOTE — TELEPHONE ENCOUNTER
"A pharmacist spent 65 minutes on the phone providing medication teaching, with [place] Willingham, for discharge with a focus on new medications/dose changes.  The discharge medication list was reviewed with the patient/family and the following points were discussed, as applicable: Name, description, purpose, dose/strength, duration of medications, common side effects, food/medications to avoid, action to be taken if dose is missed, when to call MD and how to obtain refills.  Jim reported that he felt \"great, absolutely fantastic.\"  The patient will be responsible for managing medications. Additionally, the following transplant related education was covered: Purpose of medication card, Medication videos, Timing of medications and day of lab draw considerations , Emesis or missed dose, Prescription Insurance  and Discharge process for receiving meds   Patient will  transplant supplies including 7 day pill organizer and BP monitor, at discharge pharmacy along with medications.  Patient chooses to receive medications from  specialty pharmacy. He also stated that he wanted to continue using his local [facility] for his prior to admission medications, because he felt a loyalty to them.  Clinical Pharmacy Consult:                                                      Transplant Specific:   Date of Transplant: 5/6/2021  Type of Transplant: heart  First Transplant: yes  History of rejection: no    Immunosuppression Regimen   TAC 3mg qAM & 3mg qPM, Prednisone 20mg qAM & 20mg qPM and MMF 1500mg qAM & 1500mg qPM  Patient specific goal: 10-12  Most recent level: 4.5, date 5/21/2021  Immunosuppressant Levels:  Subtherapeutic  Pt adherent to lab draws: yes  Scr:   Lab Results   Component Value Date    CR 1.83 05/21/2021     Side effects: Tremors     Prophylactic Medications  Antibacterial:  Dapsone 100mg daily  Scheduled Discontinue Date: 6 months    Antifungal: Nystatin  Scheduled Discontinue Date: 6 months    Antiviral: " CrCl 40 to 59 mL/minute: Valcyte 450 mg once daily   Scheduled Discontinue Date: 3 months    Acid Reducer: Protonix (pantoprazole)  Scheduled Reviewed Date: 6 months    Thrombosis Prevention: Aspirin 81 mg PO daily, will likely restart Warfarin before discharge  Scheduled Discontinue Date: long-term: was on both prior to admission    Blood Pressure Management  Frequency of home Blood Pressure checks: twice daily  Most recent home BP: 120/76  Patient Blood pressure goal:   Patient blood pressure at goal:  yes  Hospitalizations/ER visits since last assessment: 0      Med rec/DUR performed: yes  Med Rec Discrepancies: no    Reminders:    1. Bring to first clinic appt: med box, med card, bp monitor, all medications being taken, and lab book.  2.   MTM pharmacist visit on first clinic appt and if ok, again in 3 to 4 months during follow up appt.  3.   Avoid Grapefruit and Grapefruit juice.   4.   Avoid herbal supplements. If wish to take other medications or supplements, call your coordinator.   5.   Keep lab appts.   6.   Can use apps on phone like [employer] to help manage medication lists and reminders.   7.   Make sure you are protecting your skin by wearing long sleeves and applying sunscreen to exposed skin, for any significant time in the sun.     Transplant Coordinator is Yolette Rueda.      Nini Batista RP       .

## 2021-05-21 NOTE — PLAN OF CARE
D: Pt who presents this admission with worsening functional status and renal function concerning for worsening heart failure, pt now s/p OHT on 5/6/21. Hx of NICM (EF 20%) c/b VT s/p CRT-D s/p HMII LVAD on 6/19/2017, was originally intended as DT 2/2 obesity however with recent weight loss pt was a candidate for transplantation.     I/A: Pt alert and oriented x4. Pt SR/ST with HRs 90-100s. On RA with O2 sats >92%. Pt reports incisional chest pain that sometimes radiate to javier rib cage; scheduled tylenol and gabapentin given, PRN dilaudid given 2x. R pleural CT continued to -10 suction, net output this shift 190 ml. Dressing CDI and still to be changed. Pt went down for CXR this afternoon, results still pending. PO bumex given, pt voiding. Per pt report, last BM yesterday. Pt appetite good, denies nausea. BG checks this shift were 160 and 178; sliding scale insulin given 2x. Carb coverage insulin given 1x. RIJ site WDL, no bleed or hematoma.      P: Continue to monitor and assess pt with every encounter. Notify CVTS with any changes or concerns.

## 2021-05-21 NOTE — PROGRESS NOTES
Cardiovascular Surgery Progress Note  05/21/2021         Assessment and Plan:     Mr. Jim Willingham is a 65 year old male with history of NICM EF 20% c/b VT s/p CRT-D s/p HMII LVAD 6/19/2017 originally intended as destination therapy 2/2 obesity however with recent weight loss now candidate for transplantation. He was admitted 2/8/21 for worsening functional status and renal function concerning for worsening heart failure. He was eventually listed status 2E for transplant and received a donor offer.   Jim is now s/p orthotopic heart transplant on 5/6/21 with Dr. Ronnie Quigley. He was shocked > 2x received amio, mag and lidocaine while coming off bypass. Known lung injury with isolated air leak to right pleural.      Cardiovascular:   Hx of NICM EF 20%, VT (ICD)  Hx HeartMate II LVAD 6/19/2017  Cardiorenal syndrome   S/P orthotopic heart transplant   No arrhythmia, HD stable.  Most recent echo showed LV EF 65-70% on 5/9/21.  Basiliximab 5/10.  Terbutaline weaned to off.    ASA 81 mg, Crestor 10 mg daily.   Diuresis with Bumex 4 mg IV BID, Cards II following.   Chest tubes: Airleak isolated to Right pleural. Had some CP after walking off suction 5/13 with associated subcutaneous emphysema, needed portable suction while ambulating. Can now ambulate OFF suction 5/19. Keep to -10 mmHg while in room to reduce subcutaneous emphysema from getting worse. Very slow intermittent air leak 5/19, will do clamping trial before removing (when output improves). No air leak today, but high output of 550 ml serous fluid. Continue diuresis as per Cards. Trial water seal 5/22.   TPW: removed.   - Lymphedema wraps 5/18, improving       Pulmonary:  Hx COPD and CECILIA on CPAP  Air leak from Right pleural tube (unintended injury during surgery)   - PTA Breo ellipta, Incruse ellipta, and Singulair   - Extubated POD 1 to 4 lpm via NC. Now saturating well on RA.   - Pulm toilet, IS, activity and deep breathing  - R pleural  fluid Triglycerides normal on 5/17 (first day of increased drainage)  - Still with high output, serous fluid. Monitor.      Neurology / MSK:  Hx Depression  - PTA bupropion and amitriptyline  Post-op Status  - Neuro intact  - Acute post-operative pain well controlled with acetaminophen, PO dilaudid PRN, IV dilaudid PRN  R Hip Pain   - Complaining of R hip pain increasing over past 2 days, keeping him from raising legs into bed independently. Pain with flexion and abduction. Hip Xray 5/16 without acute concerns, try Ice. Stable.       / Renal:  WALTER on CKD stage III   - Cardiorenal syndrome improving. Most recent creatinine 2.03>1.76>1.73>1.74, stable. Monitor.    Day before surgery weight 102.3 kg. WT: 109.3 kg 5/20. No weight today.   24 hour I/O net -1.8 L     GI / FEN:   Hx Sylvester-en-Y gastric bypass  Non-severe Mild Protein-Calorie Malnutrition   - Regular diet with supplements, continue bowel regimen  - Replace electrolytes as needed, hepatic enzymes WNL.  - +BM 5/18 with lactulose. Will continue for a few days per patient request. Declined 5/18.     Endocrine:  DMT2, was on Insulin PTA  Gout   Stress induced hyperglycemia initially managed on insulin drip postop, transitioned to NPH BID and sliding scale. Converted to Lantus 5/14 am, continue prandial and high resistance sliding corrective scale.   - PTA allopurinol.   - Endocrine consulted, signed off 5/9     Infectious Disease:  Stress induced leukocytosis  - WBC 12.9 (15.7), remains afebrile, no signs or symptoms of infection. UA clean 5/15.   - Completed perioperative antibiotics.   - Persistent elevated WBC, CT Ch/ Ad/ Pv 5/18.      Hematology:   Acute blood loss anemia and thrombocytopenia  Hgb 8.4, Plts WNL, no signs or symptoms of active bleeding     Anticoagulation:   - ASA only     Prophylaxis:   - Stress ulcer prophylaxis: Pantoprazole 40 mg daily for 30 days  - DVT prophylaxis: Subcutaneous heparin, SCD     Disposition:   - Transferred to   "on 5/10   - Therapies recommending discharge to Home vs ARU    Discussed with CVTS Fellow as needed.  Staff surgeons have been informed of changes through both verbal and written communication.      Shiloh Wakefield DNP, CNP  [facility] Cardiothoracic Surgery  O: [phone]  Pgr [phone]          Interval History:     No overnight events.   States pain is well managed on current regimen. Slept well overnight.  Tolerating diet but not very hungry yet, is passing flatus, + BM yesterday. No nausea or vomiting.  Breathing well without complaints with still some cough  Working with therapies and ambulating in halls with assistance.   Denies chest pain, palpitations, dizziness, syncopal symptoms, fevers, chills, myalgias, or sternal popping/clicking.         Physical Exam:   Blood pressure 118/76, pulse 101, temperature 97.1  F (36.2  C), temperature source Axillary, resp. rate 20, height 1.727 m (5' 8\"), weight 109.3 kg (241 lb), SpO2 97 %.  Vitals:    05/17/21 1230 05/18/21 0534 05/20/21 0649   Weight: 108.4 kg (239 lb) 108.9 kg (240 lb) 109.3 kg (241 lb)      Weight  trending down    24 hr Fluid status; net loss 1.8 L.   MAPs: 87-90     Gen: A&Ox4, NAD  Neuro: no focal deficits   CV: RRR, S1 S2, + rub.   Pulm: CTA, no wheezing or rhonchi, normal breathing on RA  Abd: nondistended, normal BS, soft, nontender  Ext: moderate peripheral edema, 1+ pitting, legs wrapped   Incision: clean, dry, intact, no erythema, sternum stable  Tubes/drain sites: dressing clean and dry. Chest tube 550 ml/24 hrs. No air leak, leave another day and consider water seal.         Data:    Imaging:  reviewed recent imaging, no acute concerns, residual subcutaneous emphysema     CXR 5/21/2021: Impression:   1. Support devices appear stable in position.  2. No appreciable change in left basilar pulmonary opacity  representing atelectasis and/or consolidation.  3. Stable perihilar atelectasis.  4. Stable bilateral chest wall and axilla subcutaneous " changes  emphysema.    Labs:  BMP  Recent Labs   Lab 05/21/21  1646 05/21/21  0441 05/20/21  1750 05/20/21  0803    133 132* 130*   POTASSIUM 4.1 4.2 4.4 4.2   CHLORIDE 102 99 98 98   QAMAR 7.4* 8.0* 7.8* 7.9*   CO2 25 27 27 29   BUN 59* 59* 60* 64*   CR 1.74* 1.83* 1.95* 2.03*   * 172* 276* 114*     CBC  Recent Labs   Lab 05/21/21  0441 05/20/21  0803 05/19/21  0543 05/18/21  0746   WBC 12.9* 15.7* 16.7* 17.3*   RBC 2.58* 2.73* 2.61* 2.69*   HGB 8.0* 8.4* 8.0* 8.3*   HCT 25.7* 26.5* 25.2* 25.7*    97 97 96   MCH 31.0 30.8 30.7 30.9   MCHC 31.1* 31.7 31.7 32.3   RDW 16.8* 16.7* 15.9* 15.7*    305 278 318     INR  No lab results found in last 7 days.   Hepatic Panel  Recent Labs   Lab 05/20/21  0803 05/17/21  0530   AST 28 24   ALT 46 44   ALKPHOS 102 80   BILITOTAL 0.5 0.4   ALBUMIN 2.8* 2.5*     GLUCOSE:   Recent Labs   Lab 05/21/21  1646 05/21/21  1259 05/21/21  0817 05/21/21  0441 05/21/21  0338 05/20/21  2105 05/20/21  1750 05/20/21  1702 05/20/21  1123 05/20/21  0803 05/19/21  1750 05/19/21  1750 05/19/21  0543 05/19/21  0543   *  --   --  172*  --   --  276*  --   --  114*  --  103*  --  181*   BGM  --  178* 160*  --  179* 190*  --  212* 156*  --    < >  --    < >  --     < > = values in this interval not displayed.

## 2021-05-21 NOTE — PROGRESS NOTES
[facility]   Cardiology II Service / Advanced Heart Failure  Daily Progress Note  Date of Service: 5/21/2021      Patient: Jim Willingham  MRN: [id number]  Admission Date: 2/8/2021  Hospital Day # 102    Assessment and Plan: Jim Willingham is a 65 year old male with history of NICM EF 20% c/b VT s/p CRT-D s/p HMII LVAD 6/19/2017 originally intended as destination therapy 2/2 obesity however with recent weight loss now candidate for transplantation. He is admitted for decompensated heart failure with subsequent s/p OHT 5/6/21.     S/p OHT. 5/6/21 secondary to NICM. Echo 5/14/21 conistent with EF 65-70%, mildly decreased RV function, and dilated IVC. RHC 5/20/21 with mRA-11, RV-35/11, mPA-23, mPCWP-11, MICHELLE CO-6.2, and MICHELLE CI-2.7. DSA negative 5/13/21.  Immunosuppression: Simulect induction 5/6 and 5/10. Tac 3 mg in AM and 4 mg at HS, level 4.5. Goal 10-12. Prednisone 20 mg po BID. Cellcept 1500 mg po BID.   Serostatus: CMV: D?, R+, EBV: R+, Toxo R-  Prophylaxis: Dapsone, Nystatin, and Valcyte (3-6 months pending donor status).   - Continue Crestor and ASA.      Leukocytosis, improving. Chest X-ray 5/12/21 consistent with small right apical pneumo, small left pleural effusion, and bilateral opacities.   - WBC-12.9 (13.4).   - Repeat Chest X-ray 5/13 consistent with right apical pneumo, stable bilateral opacities, trace bilateral pleural effusions.      Right Apical Pneumothorax.   - Management per CVTS.   ================================================================  Interval History/ROS: He denies fever, chills, chest pain, palpitations, cough, nausea, vomiting, diarrhea, melena, hematochezia, and LE edema. He notes persistent RESENDIZ with mild orthopnea. He is tolerating oral intake and ambulation well.     Last 24 hr care team notes reviewed.   ROS:  4 point ROS including Respiratory, CV, GI and , other than that noted in the HPI, is negative.     Medications: Reviewed in EPIC.     Physical Exam:  "  /73 (BP Location: Left arm)   Pulse 93   Temp 97.8  F (36.6  C) (Oral)   Resp 16   Ht 1.727 m (5' 8\")   Wt 109.3 kg (241 lb)   SpO2 98%   BMI 36.64 kg/m    GENERAL: Appears alert and oriented times three.   HEENT: Eye symmetrical and free of discharge bilaterally. Mucous membranes moist and without lesions.  NECK: Supple and without lymphadenopathy. JVD 10 cm.   CV: RRR, S1S2 present without murmur, rub, or gallop.   RESPIRATORY: Respirations regular, even, and unlabored. Lungs CTA throughout.   GI: Soft and non distended with normoactive bowel sounds present in all quadrants. No tenderness, rebound, guarding. No organomegaly.   EXTREMITIES: Trace bilateral LE peripheral edema. 2+ bilateral pedal pulses.   NEUROLOGIC: Alert and orientated x 3. CN II-XII grossly intact. No focal deficits.   MUSCULOSKELETAL: No joint swelling or tenderness.   SKIN: No jaundice. No rashes or lesions.     Data:  CMP  Recent Labs   Lab 05/21/21  0441 05/20/21  1750 05/20/21  0803 05/19/21  1750 05/17/21  0530 05/17/21  0530    132* 130* 130*   < > 131*   POTASSIUM 4.2 4.4 4.2 4.3   < > 4.4   CHLORIDE 99 98 98 96   < > 97   CO2 27 27 29 29   < > 29   ANIONGAP 6 6 4 6   < > 5   * 276* 114* 103*   < > 194*   BUN 59* 60* 64* 61*   < > 48*   CR 1.83* 1.95* 2.03* 1.76*   < > 1.78*   GFRESTIMATED 38* 35* 34* 40*   < > 39*   GFRESTBLACK 44* 41* 39* 46*   < > 46*   QAMAR 8.0* 7.8* 7.9* 7.8*   < > 7.6*   MAG 2.1 2.0 2.1 2.0   < > 2.1   PROTTOTAL  --   --  5.3*  --   --  4.9*   ALBUMIN  --   --  2.8*  --   --  2.5*   BILITOTAL  --   --  0.5  --   --  0.4   ALKPHOS  --   --  102  --   --  80   AST  --   --  28  --   --  24   ALT  --   --  46  --   --  44    < > = values in this interval not displayed.     CBC  Recent Labs   Lab 05/21/21  0441 05/20/21  0803 05/19/21  0543 05/18/21  0746   WBC 12.9* 15.7* 16.7* 17.3*   RBC 2.58* 2.73* 2.61* 2.69*   HGB 8.0* 8.4* 8.0* 8.3*   HCT 25.7* 26.5* 25.2* 25.7*    97 97 96 "   MCH 31.0 30.8 30.7 30.9   MCHC 31.1* 31.7 31.7 32.3   RDW 16.8* 16.7* 15.9* 15.7*    305 278 318     Patient discussed with Dr. Montgomery.      Soraya Marshall [facility]  5/21/2021

## 2021-05-21 NOTE — PLAN OF CARE
6C OT: Cancel     Multiple attempts this AM, pt on important phone call for extended time and pt w/PT this PM. Will cancel and reschedule per POC.

## 2021-05-21 NOTE — PLAN OF CARE
D: Admitted 2/8 for decompensated heart failure with subsequent s/p OHT 5/6/21. Hx. NICM EF 20% c/b VT s/p CRT-D s/p HMII LVAD 6/19/2017 originally intended as destination therapy 2/2 obesity however with recent weight loss became candidate for transplantation.  I/A: A&Ox4. VSSA on RA/Cpap at HS. RESENDIZ. SR/ST 90s-100s. C/o incx pain partially relieved by prn tramadol. R pleural CT to -10 cm H20 suxn, w/minimal serous drainage, +air leak, drsg CDI. BG stable overnight. Adequate uop. SBA w/walker. PRN melatonin given at HS. Appeared to rest comfortably overnight.   P: Continue to monitor and notify CVTS with questions/concerns.

## 2021-05-21 NOTE — PLAN OF CARE
D: Pt who presents this admission with worsening functional status and renal function concerning for worsening heart failure, pt now s/p OHT on 5/6/21. Hx of NICM (EF 20%) c/b VT s/p CRT-D s/p HMII LVAD on 6/19/2017, was originally intended as DT 2/2 obesity however with recent weight loss pt was a candidate for transplantation.    I/A: Pt alert and oriented x4. Pt SR/ST with HRs 90-100s. On RA with O2 sats >92%. Pt reports incisional chest pain that sometimes radiate to javier rib cage; scheduled tylenol and gabapentin given, PRN dilaudid also given 1x. R pleural CT continued to -10 suction, net output this shift 150 ml. Dressing change done, site WNL. Old driveline site and CTs sites WNL. IV bumex given, pt voiding. Per pt report, last BM yesterday. Pt now back on regular diet and tolerating. BG checks this shift were 133, 156, and 212; sliding scale insulin given 2x. Carb coverage insulin also given 1x. Pt went down for RHC with biopsy this afternoon. RIJ site WDL, no bleed or hematoma.     P: Continue to monitor and assess pt with every encounter. Notify CVTS with any changes or concerns.

## 2021-05-22 ENCOUNTER — APPOINTMENT (OUTPATIENT)
Dept: PHYSICAL THERAPY | Facility: CLINIC | Age: 66
DRG: 001 | End: 2021-05-22
Attending: INTERNAL MEDICINE
Payer: COMMERCIAL

## 2021-05-22 ENCOUNTER — APPOINTMENT (OUTPATIENT)
Dept: GENERAL RADIOLOGY | Facility: CLINIC | Age: 66
DRG: 001 | End: 2021-05-22
Attending: NURSE PRACTITIONER
Payer: COMMERCIAL

## 2021-05-22 LAB
ANION GAP SERPL CALCULATED.3IONS-SCNC: 4 MMOL/L (ref 3–14)
ANION GAP SERPL CALCULATED.3IONS-SCNC: 5 MMOL/L (ref 3–14)
BUN SERPL-MCNC: 57 MG/DL (ref 7–30)
BUN SERPL-MCNC: 57 MG/DL (ref 7–30)
CALCIUM SERPL-MCNC: 7.8 MG/DL (ref 8.5–10.1)
CALCIUM SERPL-MCNC: 7.9 MG/DL (ref 8.5–10.1)
CHLORIDE SERPL-SCNC: 100 MMOL/L (ref 94–109)
CHLORIDE SERPL-SCNC: 102 MMOL/L (ref 94–109)
CO2 SERPL-SCNC: 27 MMOL/L (ref 20–32)
CO2 SERPL-SCNC: 28 MMOL/L (ref 20–32)
CREAT SERPL-MCNC: 1.6 MG/DL (ref 0.66–1.25)
CREAT SERPL-MCNC: 1.61 MG/DL (ref 0.66–1.25)
ERYTHROCYTE [DISTWIDTH] IN BLOOD BY AUTOMATED COUNT: 17 % (ref 10–15)
GFR SERPL CREATININE-BSD FRML MDRD: 45 ML/MIN/{1.73_M2}
GFR SERPL CREATININE-BSD FRML MDRD: 45 ML/MIN/{1.73_M2}
GLUCOSE BLDC GLUCOMTR-MCNC: 109 MG/DL (ref 70–99)
GLUCOSE BLDC GLUCOMTR-MCNC: 111 MG/DL (ref 70–99)
GLUCOSE BLDC GLUCOMTR-MCNC: 115 MG/DL (ref 70–99)
GLUCOSE BLDC GLUCOMTR-MCNC: 139 MG/DL (ref 70–99)
GLUCOSE SERPL-MCNC: 137 MG/DL (ref 70–99)
GLUCOSE SERPL-MCNC: 142 MG/DL (ref 70–99)
HCT VFR BLD AUTO: 23.6 % (ref 40–53)
HGB BLD-MCNC: 7.2 G/DL (ref 13.3–17.7)
HGB BLD-MCNC: 7.4 G/DL (ref 13.3–17.7)
MAGNESIUM SERPL-MCNC: 1.9 MG/DL (ref 1.6–2.3)
MAGNESIUM SERPL-MCNC: 2 MG/DL (ref 1.6–2.3)
MCH RBC QN AUTO: 29.9 PG (ref 26.5–33)
MCHC RBC AUTO-ENTMCNC: 30.5 G/DL (ref 31.5–36.5)
MCV RBC AUTO: 98 FL (ref 78–100)
PLATELET # BLD AUTO: 252 10E9/L (ref 150–450)
POTASSIUM SERPL-SCNC: 4 MMOL/L (ref 3.4–5.3)
POTASSIUM SERPL-SCNC: 4.4 MMOL/L (ref 3.4–5.3)
RBC # BLD AUTO: 2.41 10E12/L (ref 4.4–5.9)
SODIUM SERPL-SCNC: 132 MMOL/L (ref 133–144)
SODIUM SERPL-SCNC: 134 MMOL/L (ref 133–144)
TACROLIMUS BLD-MCNC: 4.9 UG/L (ref 5–15)
TME LAST DOSE: ABNORMAL H
WBC # BLD AUTO: 13.9 10E9/L (ref 4–11)

## 2021-05-22 PROCEDURE — 250N000011 HC RX IP 250 OP 636: Performed by: STUDENT IN AN ORGANIZED HEALTH CARE EDUCATION/TRAINING PROGRAM

## 2021-05-22 PROCEDURE — 214N000001 HC R&B CCU UMMC

## 2021-05-22 PROCEDURE — 83735 ASSAY OF MAGNESIUM: CPT | Performed by: PHYSICIAN ASSISTANT

## 2021-05-22 PROCEDURE — 250N000012 HC RX MED GY IP 250 OP 636 PS 637: Performed by: SURGERY

## 2021-05-22 PROCEDURE — 99232 SBSQ HOSP IP/OBS MODERATE 35: CPT | Performed by: NURSE PRACTITIONER

## 2021-05-22 PROCEDURE — 250N000013 HC RX MED GY IP 250 OP 250 PS 637: Performed by: NURSE PRACTITIONER

## 2021-05-22 PROCEDURE — 36592 COLLECT BLOOD FROM PICC: CPT | Performed by: NURSE PRACTITIONER

## 2021-05-22 PROCEDURE — 80197 ASSAY OF TACROLIMUS: CPT | Performed by: NURSE PRACTITIONER

## 2021-05-22 PROCEDURE — 97535 SELF CARE MNGMENT TRAINING: CPT | Mod: GP | Performed by: PHYSICAL THERAPIST

## 2021-05-22 PROCEDURE — 250N000012 HC RX MED GY IP 250 OP 636 PS 637: Performed by: STUDENT IN AN ORGANIZED HEALTH CARE EDUCATION/TRAINING PROGRAM

## 2021-05-22 PROCEDURE — 85027 COMPLETE CBC AUTOMATED: CPT | Performed by: PHYSICIAN ASSISTANT

## 2021-05-22 PROCEDURE — 71045 X-RAY EXAM CHEST 1 VIEW: CPT

## 2021-05-22 PROCEDURE — 250N000013 HC RX MED GY IP 250 OP 250 PS 637: Performed by: STUDENT IN AN ORGANIZED HEALTH CARE EDUCATION/TRAINING PROGRAM

## 2021-05-22 PROCEDURE — 80048 BASIC METABOLIC PNL TOTAL CA: CPT | Performed by: PHYSICIAN ASSISTANT

## 2021-05-22 PROCEDURE — 250N000011 HC RX IP 250 OP 636: Performed by: NURSE PRACTITIONER

## 2021-05-22 PROCEDURE — 250N000013 HC RX MED GY IP 250 OP 250 PS 637: Performed by: SURGERY

## 2021-05-22 PROCEDURE — 250N000011 HC RX IP 250 OP 636: Performed by: PHYSICIAN ASSISTANT

## 2021-05-22 PROCEDURE — 36592 COLLECT BLOOD FROM PICC: CPT | Performed by: PHYSICIAN ASSISTANT

## 2021-05-22 PROCEDURE — 250N000013 HC RX MED GY IP 250 OP 250 PS 637: Performed by: PHYSICIAN ASSISTANT

## 2021-05-22 PROCEDURE — 999N001017 HC STATISTIC GLUCOSE BY METER IP

## 2021-05-22 PROCEDURE — 71045 X-RAY EXAM CHEST 1 VIEW: CPT | Mod: 26 | Performed by: STUDENT IN AN ORGANIZED HEALTH CARE EDUCATION/TRAINING PROGRAM

## 2021-05-22 PROCEDURE — 85018 HEMOGLOBIN: CPT | Performed by: NURSE PRACTITIONER

## 2021-05-22 PROCEDURE — 97116 GAIT TRAINING THERAPY: CPT | Mod: GP | Performed by: PHYSICAL THERAPIST

## 2021-05-22 PROCEDURE — 250N000012 HC RX MED GY IP 250 OP 636 PS 637: Performed by: NURSE PRACTITIONER

## 2021-05-22 RX ORDER — PREDNISONE 20 MG/1
20 TABLET ORAL EVERY MORNING
Status: DISCONTINUED | OUTPATIENT
Start: 2021-05-23 | End: 2021-05-24

## 2021-05-22 RX ORDER — LEVOFLOXACIN 250 MG/1
750 TABLET, FILM COATED ORAL DAILY
Status: DISCONTINUED | OUTPATIENT
Start: 2021-05-22 | End: 2021-05-25

## 2021-05-22 RX ORDER — LEVOFLOXACIN 25 MG/ML
750 SOLUTION ORAL DAILY
Status: DISCONTINUED | OUTPATIENT
Start: 2021-05-22 | End: 2021-05-22

## 2021-05-22 RX ADMIN — MYCOPHENOLATE MOFETIL 1500 MG: 250 CAPSULE ORAL at 08:08

## 2021-05-22 RX ADMIN — ONDANSETRON 4 MG: 2 INJECTION INTRAMUSCULAR; INTRAVENOUS at 09:05

## 2021-05-22 RX ADMIN — ASPIRIN 81 MG CHEWABLE TABLET 81 MG: 81 TABLET CHEWABLE at 08:07

## 2021-05-22 RX ADMIN — NYSTATIN 1000000 UNITS: 500000 SUSPENSION ORAL at 15:59

## 2021-05-22 RX ADMIN — HEPARIN SODIUM 5000 UNITS: 5000 INJECTION, SOLUTION INTRAVENOUS; SUBCUTANEOUS at 08:07

## 2021-05-22 RX ADMIN — DAPSONE 100 MG: 25 TABLET ORAL at 08:07

## 2021-05-22 RX ADMIN — HEPARIN SODIUM 5000 UNITS: 5000 INJECTION, SOLUTION INTRAVENOUS; SUBCUTANEOUS at 00:17

## 2021-05-22 RX ADMIN — ACETAMINOPHEN 975 MG: 325 TABLET, FILM COATED ORAL at 16:00

## 2021-05-22 RX ADMIN — ACETAMINOPHEN 975 MG: 325 TABLET, FILM COATED ORAL at 08:05

## 2021-05-22 RX ADMIN — SODIUM CHLORIDE, PRESERVATIVE FREE 5 ML: 5 INJECTION INTRAVENOUS at 12:01

## 2021-05-22 RX ADMIN — SENNOSIDES AND DOCUSATE SODIUM 1 TABLET: 8.6; 5 TABLET ORAL at 08:16

## 2021-05-22 RX ADMIN — ACETAMINOPHEN 975 MG: 325 TABLET, FILM COATED ORAL at 00:17

## 2021-05-22 RX ADMIN — ROSUVASTATIN CALCIUM 10 MG: 10 TABLET, FILM COATED ORAL at 08:07

## 2021-05-22 RX ADMIN — GABAPENTIN 600 MG: 300 CAPSULE ORAL at 13:48

## 2021-05-22 RX ADMIN — DOCUSATE SODIUM 50 MG AND SENNOSIDES 8.6 MG 2 TABLET: 8.6; 5 TABLET, FILM COATED ORAL at 19:31

## 2021-05-22 RX ADMIN — NYSTATIN 1000000 UNITS: 500000 SUSPENSION ORAL at 13:47

## 2021-05-22 RX ADMIN — TACROLIMUS 3 MG: 1 CAPSULE ORAL at 08:06

## 2021-05-22 RX ADMIN — INSULIN ASPART 9 UNITS: 100 INJECTION, SOLUTION INTRAVENOUS; SUBCUTANEOUS at 13:48

## 2021-05-22 RX ADMIN — PANTOPRAZOLE SODIUM 40 MG: 40 TABLET, DELAYED RELEASE ORAL at 08:06

## 2021-05-22 RX ADMIN — TACROLIMUS 4 MG: 1 CAPSULE ORAL at 18:16

## 2021-05-22 RX ADMIN — BUPROPION HYDROCHLORIDE 100 MG: 100 TABLET, EXTENDED RELEASE ORAL at 19:30

## 2021-05-22 RX ADMIN — ALLOPURINOL 300 MG: 300 TABLET ORAL at 08:06

## 2021-05-22 RX ADMIN — Medication 5 ML: at 13:53

## 2021-05-22 RX ADMIN — UMECLIDINIUM 1 PUFF: 62.5 AEROSOL, POWDER ORAL at 08:08

## 2021-05-22 RX ADMIN — BUPROPION HYDROCHLORIDE 100 MG: 100 TABLET, EXTENDED RELEASE ORAL at 08:06

## 2021-05-22 RX ADMIN — BUMETANIDE 3 MG: 2 TABLET ORAL at 15:59

## 2021-05-22 RX ADMIN — FLUTICASONE FUROATE AND VILANTEROL TRIFENATATE 1 PUFF: 100; 25 POWDER RESPIRATORY (INHALATION) at 08:07

## 2021-05-22 RX ADMIN — LEVOFLOXACIN 750 MG: 250 TABLET, FILM COATED ORAL at 12:53

## 2021-05-22 RX ADMIN — ONDANSETRON 4 MG: 2 INJECTION INTRAMUSCULAR; INTRAVENOUS at 21:49

## 2021-05-22 RX ADMIN — PREDNISONE 15 MG: 5 TABLET ORAL at 17:44

## 2021-05-22 RX ADMIN — BUMETANIDE 3 MG: 2 TABLET ORAL at 08:05

## 2021-05-22 RX ADMIN — Medication 1 TABLET: at 08:06

## 2021-05-22 RX ADMIN — INSULIN ASPART 6 UNITS: 100 INJECTION, SOLUTION INTRAVENOUS; SUBCUTANEOUS at 19:32

## 2021-05-22 RX ADMIN — Medication 1 TABLET: at 19:31

## 2021-05-22 RX ADMIN — GABAPENTIN 300 MG: 300 CAPSULE ORAL at 08:07

## 2021-05-22 RX ADMIN — POLYETHYLENE GLYCOL 3350 17 G: 17 POWDER, FOR SOLUTION ORAL at 08:06

## 2021-05-22 RX ADMIN — NYSTATIN 1000000 UNITS: 500000 SUSPENSION ORAL at 19:39

## 2021-05-22 RX ADMIN — INSULIN ASPART 3 UNITS: 100 INJECTION, SOLUTION INTRAVENOUS; SUBCUTANEOUS at 08:24

## 2021-05-22 RX ADMIN — HYDROMORPHONE HYDROCHLORIDE 0.4 MG: 1 INJECTION, SOLUTION INTRAMUSCULAR; INTRAVENOUS; SUBCUTANEOUS at 22:45

## 2021-05-22 RX ADMIN — PREDNISONE 20 MG: 20 TABLET ORAL at 08:07

## 2021-05-22 RX ADMIN — VALGANCICLOVIR 450 MG: 450 TABLET, FILM COATED ORAL at 08:07

## 2021-05-22 RX ADMIN — HYDROMORPHONE HYDROCHLORIDE 4 MG: 2 TABLET ORAL at 08:35

## 2021-05-22 RX ADMIN — MYCOPHENOLATE MOFETIL 1500 MG: 250 CAPSULE ORAL at 19:31

## 2021-05-22 RX ADMIN — NYSTATIN 1000000 UNITS: 500000 SUSPENSION ORAL at 08:09

## 2021-05-22 RX ADMIN — HYDROMORPHONE HYDROCHLORIDE 4 MG: 2 TABLET ORAL at 00:22

## 2021-05-22 RX ADMIN — HYDROMORPHONE HYDROCHLORIDE 4 MG: 2 TABLET ORAL at 12:59

## 2021-05-22 RX ADMIN — SODIUM CHLORIDE, PRESERVATIVE FREE 5 ML: 5 INJECTION INTRAVENOUS at 05:46

## 2021-05-22 RX ADMIN — HEPARIN SODIUM 5000 UNITS: 5000 INJECTION, SOLUTION INTRAVENOUS; SUBCUTANEOUS at 15:59

## 2021-05-22 ASSESSMENT — ACTIVITIES OF DAILY LIVING (ADL)
ADLS_ACUITY_SCORE: 14

## 2021-05-22 ASSESSMENT — ENCOUNTER SYMPTOMS
PAIN SEVERITY NOW: MILD
SUBJECTIVE PAIN PROGRESSION: IMPROVING
SHORTNESS OF BREATH: 1
DIETARY ISSUES: ADEQUATE INTAKE
SUBJECTIVE PATIENT PAIN CONTROL: WELL CONTROLLED
WEAKNESS: 1
ACTIVITY IMPAIRMENT: IMPAIRED DUE TO WEAKNESS

## 2021-05-22 ASSESSMENT — MIFFLIN-ST. JEOR: SCORE: 1854.5

## 2021-05-22 NOTE — PROGRESS NOTES
Jim Willingham is a 65 year old male patient.  1. NICM (nonischemic cardiomyopathy) (H)/ EF 20%    2. Bilateral carpal tunnel syndrome    3. LVAD (left ventricular assist device) present (H)    4. NICM (nonischemic cardiomyopathy) (H)    5. S/P orthotopic heart transplant (H)    6. S/P orthotopic heart transplant (H)    7. Transplanted heart (H)      Past Medical History:   Diagnosis Date     Bariatric surgery status 2003     Benign essential hypertension 05/11/2017     Bilateral carpal tunnel syndrome 11/02/2020     Cardiomyopathy, unspecified (H) 05/08/2017     CKD (chronic kidney disease) stage 3, GFR 30-59 ml/min 05/11/2017     COPD (chronic obstructive pulmonary disease) (H) 11/02/2020     Depression 05/11/2017     Diabetes mellitus (H) 1995     Gouty arthropathy, chronic, without tophi 11/02/2020     H/O gastric bypass 05/11/2017     ICD (implantable cardioverter-defibrillator), biventricular, in situ 05/11/2017     LVAD (left ventricular assist device) present (H)      Major depression, recurrent, chronic (H) 11/02/2020     NICM (nonischemic cardiomyopathy) (H)/ EF 20% 05/11/2017    ECHO: LVEDd. 7.66 cm, Restrictive pattern , Severe mitral valve regurgitation     CECILIA (obstructive sleep apnea) 05/11/2017     Paroxysmal atrial fibrillation (H) 05/11/2017     Paroxysmal VT (H) 05/11/2017     Rotator cuff tear arthropathy of both shoulders 11/02/2020     Uncomplicated asthma      Vitamin B12 deficiency (non anemic) 05/11/2017     No current outpatient medications on file.     Allergies   Allergen Reactions     Beer Shortness Of Breath     Grass Shortness Of Breath     Ace Inhibitors Cough     Dust Mites Other (See Comments)     Asthma     Mold Other (See Comments)     Asthma     Penicillins Other (See Comments)     Unknown - childhood exposure    Tolerated Zosyn 9/18-9/20/2020     Sulfa Drugs Other (See Comments) and Unknown     Unknown childhood reaction     Principal Problem:    Bilateral carpal tunnel  "syndrome  Active Problems:    NICM (nonischemic cardiomyopathy) (H)/ EF 20%    LVAD (left ventricular assist device) present (H)    Decompensated heart failure (H)    Transplanted heart (H)    S/P orthotopic heart transplant (H)    Blood pressure 123/80, pulse 90, temperature 97.6  F (36.4  C), temperature source Oral, resp. rate 18, height 1.727 m (5' 8\"), weight 109.3 kg (241 lb), SpO2 98 %.    Subjective:  Symptoms:  Stable.  He reports shortness of breath and weakness.    Diet:  Adequate intake.    Activity level: Impaired due to weakness.    Pain:  He complains of pain that is mild.  He reports pain is improving.  Pain is well controlled.      Objective:  General Appearance:  Comfortable.    Vital signs: (most recent): Blood pressure 123/80, pulse 90, temperature 97.6  F (36.4  C), temperature source Oral, resp. rate 18, height 1.727 m (5' 8\"), weight 109.3 kg (241 lb), SpO2 98 %.  Vital signs are normal.    Output: Producing urine and producing stool.    Lungs:  Normal respiratory rate.  There are decreased breath sounds.    Heart: Normal rate.    Chest: Chest wall tenderness present.    Abdomen: Abdomen is soft.  Bowel sounds are normal.   There is incisional tenderness.    Extremities: Normal range of motion.  There is dependent edema.    Pulses: Distal pulses are intact.    Neurological: Patient is alert and oriented to person, place and time.    Pupils:  Pupils are equal, round, and reactive to light.    Skin:  Warm and dry.      Assessment:    Condition: In stable condition.  Improving.       Plan:   Encourage ambulation and per physical therapy.  Regular diet.  Administer medications as ordered.         Nabor Lara RN  5/22/2021    "

## 2021-05-22 NOTE — PLAN OF CARE
Neuro: A&Ox4. Pleasant, cooperative.  Cardiac: Afebrile, VSS.   Respiratory: RA   GI/: Voiding in urinal. BM this morning.  Diet/appetite: Tolerating meals. Zofran given this am for queasy stomach after eating.  Activity: Up ad francisco.  Pain: . Incision. PRN dilaudid oral given.  Skin: Incisions healing. Old CT sites leaking.  Lines: DL PICC. Carlos Alberto wipes done.  Drains: R pleural tube to -10cm suction. Serous drainage then more serosanguinous later in shift.    Started po levaquin for elevated WBC count.

## 2021-05-22 NOTE — PROGRESS NOTES
[facility]   Cardiology II Service / Advanced Heart Failure  Daily Progress Note  Date of Service: 5/22/2021      Patient: Jim Willingham  MRN: [id number]  Admission Date: 2/8/2021  Hospital Day # 103    Assessment and Plan: Jim Willingham is a 65 year old male with history of NICM EF 20% c/b VT s/p CRT-D s/p HMII LVAD 6/19/2017 originally intended as destination therapy 2/2 obesity however with recent weight loss now candidate for transplantation. He is admitted for decompensated heart failure with subsequent s/p OHT 5/6/21.    S/p OHT. 5/6/21 secondary to NICM. Echo 5/14/21 conistent with EF 65-70%, mildly decreased RV function, and dilated IVC. RHC 5/20/21 with mRA-11, RV-35/11, mPA-23, mPCWP-11, MICHELLE CO-6.2, and MICHELLE CI-2.7. DSA negative 5/13/21.  Immunosuppression: Simulect induction 5/6 and 5/10. Tac 3 mg in AM and 4 mg at HS, level pending. Goal 10-12. Prednisone decreased to 20 mg in AM and 15 mg in the evening. Cellcept 1500 mg po BID.   Serostatus: CMV: D+/R+, EBV: D+/R+, Toxo D-/R-  Prophylaxis: Dapsone, Nystatin, and Valcyte.   - Continue Crestor and ASA.      Leukocytosis likely secondary to HCAP. Chest X-ray 5/12/21 consistent with small right apical pneumo, small left pleural effusion, and bilateral opacities. CT Chest positive for left lung base consolidation vs atelectasis. Chest X-ray 5/21/21 consistent with left retrocardiac opacity, atelectasis vs consolidation.   - WBC-13.9 (12.9).   - Levaquin to be initiated at 750 mg po daily for 5 days.   - If no improvement in WBC with Levaquin consider transplant ID consult.      Right Apical Pneumothorax.   - Management per CVTS.   ================================================================  Interval History/ROS: He denies fever, chills, chest pain, palpitations, cough, nausea, vomiting, diarrhea. RESENDIZ and LE edema improved today. He is tolerating oral intake and ambulation.     Last 24 hr care team notes reviewed.   ROS:  4 point ROS  "including Respiratory, CV, GI and , other than that noted in the HPI, is negative.     Medications: Reviewed in EPIC.     Physical Exam:   /81 (BP Location: Left arm)   Pulse 88   Temp 96.9  F (36.1  C) (Axillary)   Resp 16   Ht 1.727 m (5' 8\")   Wt 108.5 kg (239 lb 3.2 oz)   SpO2 99%   BMI 36.37 kg/m    GENERAL: Appears alert and oriented times three.   HEENT: Eye symmetrical and free of discharge bilaterally. Mucous membranes moist and without lesions.  NECK: Supple and without lymphadenopathy. JVD 8-10 cm.   CV: RRR, S1S2 present without murmur, rub, or gallop.   RESPIRATORY: Respirations regular, even, and unlabored. Lungs CTA throughout.   GI: Soft and non distended with normoactive bowel sounds present in all quadrants. No tenderness, rebound, guarding. No organomegaly.   EXTREMITIES: +1 bilateral LE peripheral edema. 2+ bilateral pedal pulses.   NEUROLOGIC: Alert and orientated x 3. CN II-XII grossly intact. No focal deficits.   MUSCULOSKELETAL: No joint swelling or tenderness.   SKIN: No jaundice. No rashes or lesions.     Data:  CMP  Recent Labs   Lab 05/22/21  0605 05/21/21  1646 05/21/21  0441 05/20/21  1750 05/20/21  0803 05/17/21  0530 05/17/21  0530    133 133 132* 130*   < > 131*   POTASSIUM 4.0 4.1 4.2 4.4 4.2   < > 4.4   CHLORIDE 102 102 99 98 98   < > 97   CO2 28 25 27 27 29   < > 29   ANIONGAP 4 6 6 6 4   < > 5   * 229* 172* 276* 114*   < > 194*   BUN 57* 59* 59* 60* 64*   < > 48*   CR 1.60* 1.74* 1.83* 1.95* 2.03*   < > 1.78*   GFRESTIMATED 45* 41* 38* 35* 34*   < > 39*   GFRESTBLACK 52* 47* 44* 41* 39*   < > 46*   QAMAR 7.9* 7.4* 8.0* 7.8* 7.9*   < > 7.6*   MAG 2.0 1.9 2.1 2.0 2.1   < > 2.1   PROTTOTAL  --   --   --   --  5.3*  --  4.9*   ALBUMIN  --   --   --   --  2.8*  --  2.5*   BILITOTAL  --   --   --   --  0.5  --  0.4   ALKPHOS  --   --   --   --  102  --  80   AST  --   --   --   --  28  --  24   ALT  --   --   --   --  46  --  44    < > = values in this " interval not displayed.     CBC  Recent Labs   Lab 05/22/21  0605 05/21/21  0441 05/20/21  0803 05/19/21  0543   WBC 13.9* 12.9* 15.7* 16.7*   RBC 2.41* 2.58* 2.73* 2.61*   HGB 7.2* 8.0* 8.4* 8.0*   HCT 23.6* 25.7* 26.5* 25.2*   MCV 98 100 97 97   MCH 29.9 31.0 30.8 30.7   MCHC 30.5* 31.1* 31.7 31.7   RDW 17.0* 16.8* 16.7* 15.9*    275 305 278     Patient discussed with Dr. Montgomery.     Soraya Marshall [facility]  5/22/2021

## 2021-05-22 NOTE — PROGRESS NOTES
Cardiovascular Surgery Progress Note  05/22/2021         Assessment and Plan:     Mr. Jim Willingham is a 65 year old male with history of NICM EF 20% c/b VT s/p CRT-D s/p HMII LVAD 6/19/2017 originally intended as destination therapy 2/2 obesity however with recent weight loss now candidate for transplantation. He was admitted 2/8/21 for worsening functional status and renal function concerning for worsening heart failure. He was eventually listed status 2E for transplant and received a donor offer.   Jim is now s/p orthotopic heart transplant on 5/6/21 with Dr. Ronnie Quigley. He was shocked > 2x received amio, mag and lidocaine while coming off bypass. Known lung injury with isolated air leak to right pleural.      Cardiovascular:   Hx of NICM EF 20%, VT (ICD)  Hx HeartMate II LVAD 6/19/2017  Cardiorenal syndrome   S/P orthotopic heart transplant   No arrhythmia, HD stable.  Most recent echo showed LV EF 65-70% on 5/9/21.  Basiliximab 5/10.  Terbutaline weaned to off.    ASA 81 mg, Crestor 10 mg daily.   Diuresis with Bumex 4 mg IV BID, Cards II following.   Chest tubes: Airleak isolated to Right pleural. Had some CP after walking off suction 5/13 with associated subcutaneous emphysema, needed portable suction while ambulating. Can now ambulate OFF suction 5/19. Keep to -10 mmHg while in room to reduce subcutaneous emphysema from getting worse. Very slow intermittent air leak 5/19, will do clamping trial before removing (when output improves). No air leak yesterday or today, 24 hour output 200 ml of serosang fluid. Diuresing as per Cards.   TPW: removed.   - Lymphedema wraps 5/18, improving       Pulmonary:  Hx COPD and CECILIA on CPAP  Air leak from Right pleural tube (unintended injury during surgery)   Possible HAP - persistent opacity in Left retrocardiac base  - PTA Breo ellipta, Incruse ellipta, and Singulair   - Extubated POD 1 to 4 lpm via NC. Now saturating well on RA.   - Pulm toilet, IS, activity and  deep breathing  - R pleural fluid Triglycerides normal on 5/17 (first day of increased drainage)  - See above, chest tube with decreasing output, plan to remove pending CXR  - started Levaquin 750 mg every day for five days 5/22     Neurology / MSK:  Hx Depression  - PTA bupropion and amitriptyline  Post-op Status  - Neuro intact  - Acute post-operative pain well controlled with acetaminophen, PO dilaudid PRN, IV dilaudid PRN  R Hip Pain   - Complaining of R hip pain increasing over past 2 days, keeping him from raising legs into bed independently. Pain with flexion and abduction. Hip Xray 5/16 without acute concerns, try Ice. Resolved.        / Renal:  WALTER on CKD stage III   - Cardiorenal syndrome improving. Most recent creatinine 2.03>1.76>1.73>1.74, 1.60>1.77 . Monitor.    Day before surgery weight 102.3 kg. WT: 106.4 kg 5/23, trending down.   24 hour I/O net -500 ml     GI / FEN:   Hx Sylvester-en-Y gastric bypass  Non-severe Mild Protein-Calorie Malnutrition   - Regular diet with supplements, continue bowel regimen  - Replace electrolytes as needed, hepatic enzymes WNL.  - +BM 5/18 with lactulose. Will continue for a few days per patient request. Declined 5/18. Had BM X2 5/21.     Endocrine:  DMT2, was on Insulin PTA  Gout   Stress induced hyperglycemia initially managed on insulin drip postop, transitioned to NPH BID and sliding scale. Converted to Lantus 5/14 am, continue prandial and high resistance sliding corrective scale.   - PTA allopurinol.   - Endocrine consulted, signed off 5/9  - Increased Lantus to 30 U qam 5/22  - -143     Infectious Disease:  Stress induced leukocytosis  HAP, persistent left lower base opacity   - WBC 12.9 (13.9 5/22), remains afebrile, no signs or symptoms of infection. UA clean 5/15.   - Completed perioperative antibiotics.   -  CT Ch/ Ad/ Pv 5/18 done for persistent elevated WBC. findings: Consolidative opacity in LL base with ground glass opacities in both lung bases, ?  "Atypical infection with some degree of atelectasis. WBC has been decreasing since, saturating well, no cough, afebrile.   - CXR 5/22, persistent opacity in Left retrocardiac base.   - Start Levaquin 750 mg daily X5 days        Hematology:   Acute blood loss anemia and thrombocytopenia  Hgb 7.4 stable, Plts WNL, no signs or symptoms of active bleeding       Anticoagulation:   - ASA only     Prophylaxis:   - Stress ulcer prophylaxis: Pantoprazole 40 mg daily for 30 days  - DVT prophylaxis: Subcutaneous heparin, SCD     Disposition:   - Transferred to  on 5/10   - Therapies recommending discharge to Home vs ARU    Discussed with CVTS Fellow as needed.  Staff surgeons have been informed of changes through both verbal and written communication.      Shiloh Wakefield, DNP, CNP  [facility], [place] Cardiothoracic Surgery  O: [phone]  Pgr [phone]          Interval History:     No overnight events.   States pain is well managed on current regimen. Slept well overnight.  Tolerating diet, is passing flatus, + BM 5/21. No nausea or vomiting.  Breathing well without complaints  Working with therapies and ambulating in halls with assistance.   Denies chest pain, palpitations, dizziness, syncopal symptoms, fevers, chills, myalgias, or sternal popping/clicking.         Physical Exam:   Blood pressure 129/75, pulse 84, temperature 97.6  F (36.4  C), temperature source Oral, resp. rate 16, height 1.727 m (5' 8\"), weight 108.5 kg (239 lb 3.2 oz), SpO2 99 %.  Vitals:    05/18/21 0534 05/20/21 0649 05/22/21 0600   Weight: 108.9 kg (240 lb) 109.3 kg (241 lb) 108.5 kg (239 lb 3.2 oz)      Weight  trending down    24 hr Fluid status; net loss 500 ml  MAPs: 79-90     Gen: A&Ox4, NAD  Neuro: no focal deficits   CV: RRR, S1 S2, + rub.   Pulm: CTA, no wheezing or rhonchi, normal breathing on RA  Abd: nondistended, normal BS, soft, nontender  Ext: decreasing peripheral edema, 1+ pitting, legs wrapped   Incision: clean, dry, intact, no erythema, " sternum stable  Tubes/drain sites: dressing clean and dry. Chest tube 200 ml/24 hrs. No air leak         Data:    Imaging:  reviewed recent imaging, no acute concerns, residual subcutaneous emphysema     CXR 5/23/2021: pending     Labs:  BMP  Recent Labs   Lab 05/21/21  1646 05/21/21  0441 05/20/21  1750 05/20/21  0803    133 132* 130*   POTASSIUM 4.1 4.2 4.4 4.2   CHLORIDE 102 99 98 98   QAMAR 7.4* 8.0* 7.8* 7.9*   CO2 25 27 27 29   BUN 59* 59* 60* 64*   CR 1.74* 1.83* 1.95* 2.03*   * 172* 276* 114*     CBC  Recent Labs   Lab 05/21/21  0441 05/20/21  0803 05/19/21  0543 05/18/21  0746   WBC 12.9* 15.7* 16.7* 17.3*   RBC 2.58* 2.73* 2.61* 2.69*   HGB 8.0* 8.4* 8.0* 8.3*   HCT 25.7* 26.5* 25.2* 25.7*    97 97 96   MCH 31.0 30.8 30.7 30.9   MCHC 31.1* 31.7 31.7 32.3   RDW 16.8* 16.7* 15.9* 15.7*    305 278 318     INR  No lab results found in last 7 days.   Hepatic Panel  Recent Labs   Lab 05/20/21  0803 05/17/21  0530   AST 28 24   ALT 46 44   ALKPHOS 102 80   BILITOTAL 0.5 0.4   ALBUMIN 2.8* 2.5*     GLUCOSE:   Recent Labs   Lab 05/21/21  2137 05/21/21  1908 05/21/21  1646 05/21/21  1259 05/21/21  0817 05/21/21  0441 05/21/21  0338 05/20/21  2105 05/20/21  1750 05/20/21  0803 05/20/21  0803 05/19/21  1750 05/19/21  1750 05/19/21  0543 05/19/21  0543   GLC  --   --  229*  --   --  172*  --   --  276*  --  114*  --  103*  --  181*   * 170*  --  178* 160*  --  179* 190*  --    < >  --    < >  --    < >  --     < > = values in this interval not displayed.

## 2021-05-23 ENCOUNTER — APPOINTMENT (OUTPATIENT)
Dept: PHYSICAL THERAPY | Facility: CLINIC | Age: 66
DRG: 001 | End: 2021-05-23
Attending: INTERNAL MEDICINE
Payer: COMMERCIAL

## 2021-05-23 ENCOUNTER — APPOINTMENT (OUTPATIENT)
Dept: GENERAL RADIOLOGY | Facility: CLINIC | Age: 66
DRG: 001 | End: 2021-05-23
Attending: NURSE PRACTITIONER
Payer: COMMERCIAL

## 2021-05-23 LAB
ANION GAP SERPL CALCULATED.3IONS-SCNC: 5 MMOL/L (ref 3–14)
ANION GAP SERPL CALCULATED.3IONS-SCNC: 5 MMOL/L (ref 3–14)
BACTERIA SPEC CULT: NO GROWTH
BACTERIA SPEC CULT: NO GROWTH
BUN SERPL-MCNC: 56 MG/DL (ref 7–30)
BUN SERPL-MCNC: 58 MG/DL (ref 7–30)
CALCIUM SERPL-MCNC: 7.7 MG/DL (ref 8.5–10.1)
CALCIUM SERPL-MCNC: 7.8 MG/DL (ref 8.5–10.1)
CHLORIDE SERPL-SCNC: 100 MMOL/L (ref 94–109)
CHLORIDE SERPL-SCNC: 101 MMOL/L (ref 94–109)
CO2 SERPL-SCNC: 26 MMOL/L (ref 20–32)
CO2 SERPL-SCNC: 29 MMOL/L (ref 20–32)
CREAT SERPL-MCNC: 1.77 MG/DL (ref 0.66–1.25)
CREAT SERPL-MCNC: 1.98 MG/DL (ref 0.66–1.25)
ERYTHROCYTE [DISTWIDTH] IN BLOOD BY AUTOMATED COUNT: 17.1 % (ref 10–15)
GFR SERPL CREATININE-BSD FRML MDRD: 35 ML/MIN/{1.73_M2}
GFR SERPL CREATININE-BSD FRML MDRD: 40 ML/MIN/{1.73_M2}
GLUCOSE BLDC GLUCOMTR-MCNC: 138 MG/DL (ref 70–99)
GLUCOSE BLDC GLUCOMTR-MCNC: 143 MG/DL (ref 70–99)
GLUCOSE BLDC GLUCOMTR-MCNC: 147 MG/DL (ref 70–99)
GLUCOSE BLDC GLUCOMTR-MCNC: 194 MG/DL (ref 70–99)
GLUCOSE SERPL-MCNC: 148 MG/DL (ref 70–99)
GLUCOSE SERPL-MCNC: 240 MG/DL (ref 70–99)
HCT VFR BLD AUTO: 23.7 % (ref 40–53)
HGB BLD-MCNC: 7.4 G/DL (ref 13.3–17.7)
Lab: NORMAL
Lab: NORMAL
MAGNESIUM SERPL-MCNC: 1.8 MG/DL (ref 1.6–2.3)
MAGNESIUM SERPL-MCNC: 1.8 MG/DL (ref 1.6–2.3)
MCH RBC QN AUTO: 30.7 PG (ref 26.5–33)
MCHC RBC AUTO-ENTMCNC: 31.2 G/DL (ref 31.5–36.5)
MCV RBC AUTO: 98 FL (ref 78–100)
PLATELET # BLD AUTO: 240 10E9/L (ref 150–450)
POTASSIUM SERPL-SCNC: 4.2 MMOL/L (ref 3.4–5.3)
POTASSIUM SERPL-SCNC: 4.9 MMOL/L (ref 3.4–5.3)
RBC # BLD AUTO: 2.41 10E12/L (ref 4.4–5.9)
SODIUM SERPL-SCNC: 131 MMOL/L (ref 133–144)
SODIUM SERPL-SCNC: 135 MMOL/L (ref 133–144)
SPECIMEN SOURCE: NORMAL
SPECIMEN SOURCE: NORMAL
TACROLIMUS BLD-MCNC: 6.3 UG/L (ref 5–15)
TME LAST DOSE: NORMAL H
WBC # BLD AUTO: 12.4 10E9/L (ref 4–11)

## 2021-05-23 PROCEDURE — 250N000011 HC RX IP 250 OP 636: Performed by: PHYSICIAN ASSISTANT

## 2021-05-23 PROCEDURE — 250N000013 HC RX MED GY IP 250 OP 250 PS 637: Performed by: STUDENT IN AN ORGANIZED HEALTH CARE EDUCATION/TRAINING PROGRAM

## 2021-05-23 PROCEDURE — 85027 COMPLETE CBC AUTOMATED: CPT | Performed by: PHYSICIAN ASSISTANT

## 2021-05-23 PROCEDURE — 999N001017 HC STATISTIC GLUCOSE BY METER IP

## 2021-05-23 PROCEDURE — 250N000012 HC RX MED GY IP 250 OP 636 PS 637: Performed by: SURGERY

## 2021-05-23 PROCEDURE — 250N000013 HC RX MED GY IP 250 OP 250 PS 637: Performed by: SURGERY

## 2021-05-23 PROCEDURE — 250N000013 HC RX MED GY IP 250 OP 250 PS 637: Performed by: PHYSICIAN ASSISTANT

## 2021-05-23 PROCEDURE — 71046 X-RAY EXAM CHEST 2 VIEWS: CPT | Mod: 76

## 2021-05-23 PROCEDURE — 71046 X-RAY EXAM CHEST 2 VIEWS: CPT | Mod: 26 | Performed by: RADIOLOGY

## 2021-05-23 PROCEDURE — 99232 SBSQ HOSP IP/OBS MODERATE 35: CPT | Performed by: NURSE PRACTITIONER

## 2021-05-23 PROCEDURE — 250N000013 HC RX MED GY IP 250 OP 250 PS 637: Performed by: NURSE PRACTITIONER

## 2021-05-23 PROCEDURE — 97530 THERAPEUTIC ACTIVITIES: CPT | Mod: GP | Performed by: PHYSICAL THERAPIST

## 2021-05-23 PROCEDURE — 999N000044 HC STATISTIC CVC DRESSING CHANGE

## 2021-05-23 PROCEDURE — 214N000001 HC R&B CCU UMMC

## 2021-05-23 PROCEDURE — 250N000011 HC RX IP 250 OP 636: Performed by: NURSE PRACTITIONER

## 2021-05-23 PROCEDURE — 83735 ASSAY OF MAGNESIUM: CPT | Performed by: PHYSICIAN ASSISTANT

## 2021-05-23 PROCEDURE — 36592 COLLECT BLOOD FROM PICC: CPT | Performed by: PHYSICIAN ASSISTANT

## 2021-05-23 PROCEDURE — 80197 ASSAY OF TACROLIMUS: CPT | Performed by: NURSE PRACTITIONER

## 2021-05-23 PROCEDURE — 250N000011 HC RX IP 250 OP 636: Performed by: STUDENT IN AN ORGANIZED HEALTH CARE EDUCATION/TRAINING PROGRAM

## 2021-05-23 PROCEDURE — 250N000012 HC RX MED GY IP 250 OP 636 PS 637: Performed by: NURSE PRACTITIONER

## 2021-05-23 PROCEDURE — 97535 SELF CARE MNGMENT TRAINING: CPT | Mod: GP | Performed by: PHYSICAL THERAPIST

## 2021-05-23 PROCEDURE — 71046 X-RAY EXAM CHEST 2 VIEWS: CPT

## 2021-05-23 PROCEDURE — 97116 GAIT TRAINING THERAPY: CPT | Mod: GP | Performed by: PHYSICAL THERAPIST

## 2021-05-23 PROCEDURE — 80048 BASIC METABOLIC PNL TOTAL CA: CPT | Performed by: PHYSICIAN ASSISTANT

## 2021-05-23 RX ADMIN — ACETAMINOPHEN 975 MG: 325 TABLET, FILM COATED ORAL at 00:11

## 2021-05-23 RX ADMIN — MYCOPHENOLATE MOFETIL 1500 MG: 250 CAPSULE ORAL at 08:41

## 2021-05-23 RX ADMIN — INSULIN ASPART 3 UNITS: 100 INJECTION, SOLUTION INTRAVENOUS; SUBCUTANEOUS at 08:29

## 2021-05-23 RX ADMIN — Medication 50 MG: at 04:16

## 2021-05-23 RX ADMIN — NYSTATIN 1000000 UNITS: 500000 SUSPENSION ORAL at 11:53

## 2021-05-23 RX ADMIN — MYCOPHENOLATE MOFETIL 1500 MG: 250 CAPSULE ORAL at 20:38

## 2021-05-23 RX ADMIN — SODIUM CHLORIDE, PRESERVATIVE FREE 5 ML: 5 INJECTION INTRAVENOUS at 23:55

## 2021-05-23 RX ADMIN — ACETAMINOPHEN 975 MG: 325 TABLET, FILM COATED ORAL at 16:48

## 2021-05-23 RX ADMIN — HEPARIN SODIUM 5000 UNITS: 5000 INJECTION, SOLUTION INTRAVENOUS; SUBCUTANEOUS at 00:11

## 2021-05-23 RX ADMIN — Medication 10 ML: at 13:56

## 2021-05-23 RX ADMIN — HYDROMORPHONE HYDROCHLORIDE 4 MG: 2 TABLET ORAL at 05:39

## 2021-05-23 RX ADMIN — NYSTATIN 1000000 UNITS: 500000 SUSPENSION ORAL at 20:39

## 2021-05-23 RX ADMIN — Medication 1 TABLET: at 08:42

## 2021-05-23 RX ADMIN — Medication 37.5 MG: at 00:16

## 2021-05-23 RX ADMIN — LEVOFLOXACIN 750 MG: 250 TABLET, FILM COATED ORAL at 08:40

## 2021-05-23 RX ADMIN — SODIUM CHLORIDE, PRESERVATIVE FREE 5 ML: 5 INJECTION INTRAVENOUS at 05:33

## 2021-05-23 RX ADMIN — DAPSONE 100 MG: 25 TABLET ORAL at 08:41

## 2021-05-23 RX ADMIN — NYSTATIN 1000000 UNITS: 500000 SUSPENSION ORAL at 16:49

## 2021-05-23 RX ADMIN — FLUTICASONE FUROATE AND VILANTEROL TRIFENATATE 1 PUFF: 100; 25 POWDER RESPIRATORY (INHALATION) at 08:40

## 2021-05-23 RX ADMIN — GABAPENTIN 600 MG: 300 CAPSULE ORAL at 00:17

## 2021-05-23 RX ADMIN — PANTOPRAZOLE SODIUM 40 MG: 40 TABLET, DELAYED RELEASE ORAL at 08:42

## 2021-05-23 RX ADMIN — HYDROMORPHONE HYDROCHLORIDE 4 MG: 2 TABLET ORAL at 00:59

## 2021-05-23 RX ADMIN — ALUMINUM HYDROXIDE, MAGNESIUM HYDROXIDE, AND SIMETHICONE 30 ML: 200; 200; 20 SUSPENSION ORAL at 22:11

## 2021-05-23 RX ADMIN — TACROLIMUS 4 MG: 1 CAPSULE ORAL at 17:56

## 2021-05-23 RX ADMIN — UMECLIDINIUM 1 PUFF: 62.5 AEROSOL, POWDER ORAL at 10:00

## 2021-05-23 RX ADMIN — NYSTATIN 1000000 UNITS: 500000 SUSPENSION ORAL at 08:42

## 2021-05-23 RX ADMIN — HYDROMORPHONE HYDROCHLORIDE 4 MG: 2 TABLET ORAL at 11:22

## 2021-05-23 RX ADMIN — TACROLIMUS 3 MG: 1 CAPSULE ORAL at 08:42

## 2021-05-23 RX ADMIN — INSULIN ASPART 2 UNITS: 100 INJECTION, SOLUTION INTRAVENOUS; SUBCUTANEOUS at 13:55

## 2021-05-23 RX ADMIN — SENNOSIDES AND DOCUSATE SODIUM 1 TABLET: 8.6; 5 TABLET ORAL at 08:52

## 2021-05-23 RX ADMIN — ONDANSETRON 4 MG: 2 INJECTION INTRAMUSCULAR; INTRAVENOUS at 21:09

## 2021-05-23 RX ADMIN — BUMETANIDE 3 MG: 2 TABLET ORAL at 16:49

## 2021-05-23 RX ADMIN — VALGANCICLOVIR 450 MG: 450 TABLET, FILM COATED ORAL at 08:40

## 2021-05-23 RX ADMIN — BUMETANIDE 3 MG: 2 TABLET ORAL at 08:41

## 2021-05-23 RX ADMIN — PREDNISONE 20 MG: 20 TABLET ORAL at 08:42

## 2021-05-23 RX ADMIN — BUPROPION HYDROCHLORIDE 100 MG: 100 TABLET, EXTENDED RELEASE ORAL at 20:39

## 2021-05-23 RX ADMIN — ALLOPURINOL 300 MG: 300 TABLET ORAL at 08:42

## 2021-05-23 RX ADMIN — HYDROMORPHONE HYDROCHLORIDE 0.4 MG: 1 INJECTION, SOLUTION INTRAMUSCULAR; INTRAVENOUS; SUBCUTANEOUS at 23:42

## 2021-05-23 RX ADMIN — MONTELUKAST 10 MG: 10 TABLET, FILM COATED ORAL at 00:16

## 2021-05-23 RX ADMIN — PREDNISONE 15 MG: 5 TABLET ORAL at 16:49

## 2021-05-23 RX ADMIN — SENNOSIDES AND DOCUSATE SODIUM 1 TABLET: 8.6; 5 TABLET ORAL at 20:39

## 2021-05-23 RX ADMIN — HEPARIN SODIUM 5000 UNITS: 5000 INJECTION, SOLUTION INTRAVENOUS; SUBCUTANEOUS at 08:57

## 2021-05-23 RX ADMIN — BUPROPION HYDROCHLORIDE 100 MG: 100 TABLET, EXTENDED RELEASE ORAL at 08:42

## 2021-05-23 RX ADMIN — ACETAMINOPHEN 975 MG: 325 TABLET, FILM COATED ORAL at 08:41

## 2021-05-23 RX ADMIN — ASPIRIN 81 MG CHEWABLE TABLET 81 MG: 81 TABLET CHEWABLE at 11:21

## 2021-05-23 RX ADMIN — Medication 50 MG: at 20:39

## 2021-05-23 RX ADMIN — GABAPENTIN 300 MG: 300 CAPSULE ORAL at 08:42

## 2021-05-23 RX ADMIN — HYDROMORPHONE HYDROCHLORIDE 4 MG: 2 TABLET ORAL at 16:49

## 2021-05-23 RX ADMIN — GABAPENTIN 600 MG: 300 CAPSULE ORAL at 13:56

## 2021-05-23 RX ADMIN — ONDANSETRON 4 MG: 2 INJECTION INTRAMUSCULAR; INTRAVENOUS at 08:38

## 2021-05-23 RX ADMIN — HEPARIN SODIUM 5000 UNITS: 5000 INJECTION, SOLUTION INTRAVENOUS; SUBCUTANEOUS at 16:49

## 2021-05-23 RX ADMIN — POLYETHYLENE GLYCOL 3350 17 G: 17 POWDER, FOR SOLUTION ORAL at 08:52

## 2021-05-23 RX ADMIN — HEPARIN SODIUM 5000 UNITS: 5000 INJECTION, SOLUTION INTRAVENOUS; SUBCUTANEOUS at 23:44

## 2021-05-23 RX ADMIN — INSULIN ASPART 3 UNITS: 100 INJECTION, SOLUTION INTRAVENOUS; SUBCUTANEOUS at 19:27

## 2021-05-23 RX ADMIN — Medication 10 MG: at 00:21

## 2021-05-23 RX ADMIN — ROSUVASTATIN CALCIUM 10 MG: 10 TABLET, FILM COATED ORAL at 08:40

## 2021-05-23 ASSESSMENT — ACTIVITIES OF DAILY LIVING (ADL)
ADLS_ACUITY_SCORE: 14

## 2021-05-23 ASSESSMENT — MIFFLIN-ST. JEOR: SCORE: 1833.64

## 2021-05-23 NOTE — PLAN OF CARE
OT/6C: Cancel- Pt declining upon attempt, unable to check back due to schedule demands. Rescheduled for 5/24/21

## 2021-05-23 NOTE — PLAN OF CARE
Neuro: A&Ox4. Pleasant, cooperative.  Cardiac: Afebrile, VSS.            Respiratory: RA   GI/: Voiding in urinal. BM 5/22.  Diet/appetite: Tolerating meals. Zofran given this am before taking medications.   Activity: Up ad francisco.  Pain: . Left upper chest incision. PRN dilaudid oral given.  Skin: Incisions healing. Old CT sites leaking, requiring dressing changes.  Lines: DL PICC. Carlos Alberto wipes done.  Drains: R pleural removed this afternoon. CXR ordered for 1600.     Carlos Alberto wipes not done yet due to busy afternoon and patient wanting to wait until after working with therapy.

## 2021-05-23 NOTE — PLAN OF CARE
"/68 (BP Location: Left arm)   Pulse 91   Temp 97.7  F (36.5  C) (Oral)   Resp 16   Ht 1.727 m (5' 8\")   Wt 106.4 kg (234 lb 9.6 oz)   SpO2 97%   BMI 35.67 kg/m    Neuro: A&Ox4. Neuro intact  Cardiac: Afebrile, VSS. NSR   Respiratory: RA   GI/: Voiding spontaneously. No BM this shift.   Diet/appetite: Tolerating reg diet. Nausea resolved.  Activity: Up with stand by assist/ independent in room    Pain: incisional pian maintained at tolerable level with prn po dilaudid x1 and tramadol x1  Skin: incisions CDI/CRISPIN. Chest tube dressing with minimal serous drainage- changed x1. Old chest tube site healing with small amt yellow creamy drainage present. BLE lymphedema wraps intact.  Lines: PICC hep locked.  Drains: CT to -10 continuous suction. Small amt serous output  Replacement: awaiting am lab results.    Rested btwn cares. Able to make needs known. Continue POC.     "

## 2021-05-23 NOTE — PROGRESS NOTES
[facility]   Cardiology II Service / Advanced Heart Failure  Daily Progress Note  Date of Service: 5/23/2021      Patient: Jim Willingham  MRN: [id number]  Admission Date: 2/8/2021  Hospital Day # 104    Assessment and Plan: Jim Willingham is a 65 year old male with history of NICM EF 20% c/b VT s/p CRT-D s/p HMII LVAD 6/19/2017 originally intended as destination therapy 2/2 obesity however with recent weight loss now candidate for transplantation. He is admitted for decompensated heart failure with subsequent s/p OHT 5/6/21.     S/p OHT. 5/6/21 secondary to NICM. Echo 5/14/21 conistent with EF 65-70%, mildly decreased RV function, and dilated IVC. RHC 5/20/21 with mRA-11, RV-35/11, mPA-23, mPCWP-11, MICHELLE CO-6.2, and MICHELLE CI-2.7. DSA negative 5/13/21.  Immunosuppression: Simulect induction 5/6 and 5/10. Tac 3 mg in AM and 4 mg at HS, level pending this AM 4.9 5/22. Goal 10-12. Prednisone 20 mg in AM and 15 mg in the evening. Cellcept 1500 mg po BID.   Serostatus: CMV: D+/R+, EBV: D+/R+, Toxo D-/R-  Prophylaxis: Dapsone, Nystatin, and Valcyte.   - Continue Crestor and ASA.      Leukocytosis likely secondary to HCAP. Chest X-ray 5/12/21 consistent with small right apical pneumo, small left pleural effusion, and bilateral opacities. CT Chest positive for left lung base consolidation vs atelectasis. Chest X-ray 5/21/21 consistent with left retrocardiac opacity, atelectasis vs consolidation.   - WBC-12.4 (13.9).   - Levaquin 750 mg po day 2/5.     Right Apical Pneumothorax.   - Management per CVTS.   ================================================================  Interval History/ROS: He notes emesis with intake of apple last HS, which he again attributes to his gastric bypass. He denies fever, chills, chest pain, palpitations, cough, nausea, diarrhea. He notes LE edema improving.     Last 24 hr care team notes reviewed.   ROS:  4 point ROS including Respiratory, CV, GI and , other than that noted in  "the HPI, is negative.     Medications: Reviewed in EPIC.     Physical Exam:   /73 (BP Location: Left arm)   Pulse 80   Temp 97.2  F (36.2  C) (Oral)   Resp 18   Ht 1.727 m (5' 8\")   Wt 106.4 kg (234 lb 9.6 oz)   SpO2 97%   BMI 35.67 kg/m    GENERAL: Appears alert and oriented times three.   HEENT: Eye symmetrical and free of discharge bilaterally. Mucous membranes moist and without lesions.  NECK: Supple and without lymphadenopathy. JVD 8-10 cm.   CV: RRR, S1S2 present without murmur, rub, or gallop.   RESPIRATORY: Respirations regular, even, and unlabored. Lungs CTA throughout.   GI: Soft and non distended with normoactive bowel sounds present in all quadrants. No tenderness, rebound, guarding. No organomegaly.   EXTREMITIES: Trace bilateral LE peripheral edema. 2+ bilateral pedal pulses.   NEUROLOGIC: Alert and orientated x 3. CN II-XII grossly intact. No focal deficits.   MUSCULOSKELETAL: No joint swelling or tenderness.   SKIN: No jaundice. No rashes or lesions. No tenderness or induration noted at left pacer site.     Data:  CMP  Recent Labs   Lab 05/23/21  0542 05/22/21  1718 05/22/21  0605 05/21/21  1646 05/20/21  0803 05/20/21  0803 05/17/21  0530 05/17/21  0530    132* 134 133   < > 130*   < > 131*   POTASSIUM 4.2 4.4 4.0 4.1   < > 4.2   < > 4.4   CHLORIDE 101 100 102 102   < > 98   < > 97   CO2 29 27 28 25   < > 29   < > 29   ANIONGAP 5 5 4 6   < > 4   < > 5   * 142* 137* 229*   < > 114*   < > 194*   BUN 56* 57* 57* 59*   < > 64*   < > 48*   CR 1.77* 1.61* 1.60* 1.74*   < > 2.03*   < > 1.78*   GFRESTIMATED 40* 45* 45* 41*   < > 34*   < > 39*   GFRESTBLACK 46* 52* 52* 47*   < > 39*   < > 46*   QAMAR 7.7* 7.8* 7.9* 7.4*   < > 7.9*   < > 7.6*   MAG 1.8 1.9 2.0 1.9   < > 2.1   < > 2.1   PROTTOTAL  --   --   --   --   --  5.3*  --  4.9*   ALBUMIN  --   --   --   --   --  2.8*  --  2.5*   BILITOTAL  --   --   --   --   --  0.5  --  0.4   ALKPHOS  --   --   --   --   --  102  --  80   AST  " --   --   --   --   --  28  --  24   ALT  --   --   --   --   --  46  --  44    < > = values in this interval not displayed.     CBC  Recent Labs   Lab 05/23/21  0542 05/22/21  1206 05/22/21  0605 05/21/21  0441 05/20/21  0803   WBC 12.4*  --  13.9* 12.9* 15.7*   RBC 2.41*  --  2.41* 2.58* 2.73*   HGB 7.4* 7.4* 7.2* 8.0* 8.4*   HCT 23.7*  --  23.6* 25.7* 26.5*   MCV 98  --  98 100 97   MCH 30.7  --  29.9 31.0 30.8   MCHC 31.2*  --  30.5* 31.1* 31.7   RDW 17.1*  --  17.0* 16.8* 16.7*     --  252 275 305     Patient discussed with Dr. Montgomery.      Soraya Marshall FN  5/23/2021

## 2021-05-23 NOTE — PLAN OF CARE
D/I/A: Pt here s/p OHT. 1 CT remains to -10cm suction. Carbs counted and covered. Pt did have experience nausea later in the evening. He felt that a food chunk had gotten stuck in the area where his gastric bypass had been performed. Pt given zofran, did have a couple episodes of vomiting but no pills were found in vomited material. Pt felt better after vomiting and zofran given. SR, RA, SBA.  P: Continue to monitor.

## 2021-05-23 NOTE — PROGRESS NOTES
Cardiovascular Surgery Progress Note  05/23/2021         Assessment and Plan:     Mr. Jim Willingham is a 65 year old male with history of NICM EF 20% c/b VT s/p CRT-D s/p HMII LVAD 6/19/2017 originally intended as destination therapy 2/2 obesity however with recent weight loss now candidate for transplantation. He was admitted 2/8/21 for worsening functional status and renal function concerning for worsening heart failure. He was eventually listed status 2E for transplant and received a donor offer.   Jim is now s/p orthotopic heart transplant on 5/6/21 with Dr. Ronnie Qiugley. He was shocked > 2x received amio, mag and lidocaine while coming off bypass. Known lung injury with isolated air leak to right pleural.      Cardiovascular:   Hx of NICM EF 20%, VT (ICD)  Hx HeartMate II LVAD 6/19/2017  Cardiorenal syndrome   S/P orthotopic heart transplant   No arrhythmia, HD stable.  Most recent echo showed LV EF 65-70% on 5/9/21.  Basiliximab 5/10.  Terbutaline weaned to off.    ASA 81 mg, Crestor 10 mg daily.   Diuresis with Bumex 4 mg IV BID, Cards II following.   Chest tubes: Airleak isolated to Right pleural. Had some CP after walking off suction 5/13 with associated subcutaneous emphysema, needed portable suction while ambulating. Can now ambulate OFF suction 5/19. Keep to -10 mmHg while in room to reduce subcutaneous emphysema from getting worse. Very slow intermittent air leak 5/19, will do clamping trial before removing (when output improves). No air leak yesterday or today, 24 hour output 200 ml of serosang fluid. Diuresing as per Cards.   TPW: removed.   - Lymphedema wraps 5/18, improving       Pulmonary:  Hx COPD and CECILIA on CPAP  Air leak from Right pleural tube (unintended injury during surgery)   Possible HAP - persistent opacity in Left retrocardiac base  - PTA Breo ellipta, Incruse ellipta, and Singulair   - Extubated POD 1 to 4 lpm via NC. Now saturating well on RA.   - Pulm toilet, IS, activity and  deep breathing  - R pleural fluid Triglycerides normal on 5/17 (first day of increased drainage)  - See above, chest tube with decreasing output, plan to remove pending CXR  - started Levaquin 750 mg every day for five days 5/22     Neurology / MSK:  Hx Depression  - PTA bupropion and amitriptyline  Post-op Status  - Neuro intact  - Acute post-operative pain well controlled with acetaminophen, PO dilaudid PRN, IV dilaudid PRN  R Hip Pain   - Complaining of R hip pain increasing over past 2 days, keeping him from raising legs into bed independently. Pain with flexion and abduction. Hip Xray 5/16 without acute concerns, try Ice. Resolved.        / Renal:  WALTER on CKD stage III   - Cardiorenal syndrome improving. Most recent creatinine 2.03>1.76>1.73>1.74, 1.60>1.77 . Monitor.    Day before surgery weight 102.3 kg. WT: 106.4 kg 5/23, trending down.   24 hour I/O net -500 ml     GI / FEN:   Hx Sylvester-en-Y gastric bypass  Non-severe Mild Protein-Calorie Malnutrition   - Regular diet with supplements, continue bowel regimen  - Replace electrolytes as needed, hepatic enzymes WNL.  - +BM 5/18 with lactulose. Will continue for a few days per patient request. Declined 5/18. Had BM X2 5/21.     Endocrine:  DMT2, was on Insulin PTA  Gout   Stress induced hyperglycemia initially managed on insulin drip postop, transitioned to NPH BID and sliding scale. Converted to Lantus 5/14 am, continue prandial and high resistance sliding corrective scale.   - PTA allopurinol.   - Endocrine consulted, signed off 5/9  - Increased Lantus to 30 U qam 5/22  - -143     Infectious Disease:  Stress induced leukocytosis  HAP, persistent left lower base opacity   - WBC 12.9 (13.9 5/22), remains afebrile, no signs or symptoms of infection. UA clean 5/15.   - Completed perioperative antibiotics.   -  CT Ch/ Ad/ Pv 5/18 done for persistent elevated WBC. findings: Consolidative opacity in LL base with ground glass opacities in both lung bases, ?  "Atypical infection with some degree of atelectasis. WBC has been decreasing since, saturating well, no cough, afebrile.   - CXR 5/22, persistent opacity in Left retrocardiac base.   - Start Levaquin 750 mg daily X5 days        Hematology:   Acute blood loss anemia and thrombocytopenia  Hgb 7.4 stable, Plts WNL, no signs or symptoms of active bleeding       Anticoagulation:   - ASA only     Prophylaxis:   - Stress ulcer prophylaxis: Pantoprazole 40 mg daily for 30 days  - DVT prophylaxis: Subcutaneous heparin, SCD     Disposition:   - Transferred to  on 5/10   - Therapies recommending discharge to Home vs ARU    Discussed with CVTS Fellow as needed.  Staff surgeons have been informed of changes through both verbal and written communication.      Shiloh Wakefield, DNP, CNP  [facility] Cardiothoracic Surgery  O: [phone]  Pgr [phone]          Interval History:     No overnight events.   States pain is well managed on current regimen. Slept well overnight.  Tolerating diet, is passing flatus, + BM 5/21. No nausea or vomiting.  Breathing well without complaints  Working with therapies and ambulating in halls with assistance.   Denies chest pain, palpitations, dizziness, syncopal symptoms, fevers, chills, myalgias, or sternal popping/clicking.         Physical Exam:   Blood pressure 116/79, pulse 94, temperature 97.9  F (36.6  C), temperature source Oral, resp. rate 18, height 1.727 m (5' 8\"), weight 106.4 kg (234 lb 9.6 oz), SpO2 96 %.  Vitals:    05/20/21 0649 05/22/21 0600 05/23/21 0406   Weight: 109.3 kg (241 lb) 108.5 kg (239 lb 3.2 oz) 106.4 kg (234 lb 9.6 oz)      Weight  trending down    24 hr Fluid status; net loss 500 ml  MAPs: 79-90     Gen: A&Ox4, NAD  Neuro: no focal deficits   CV: RRR, S1 S2, + rub.   Pulm: CTA, no wheezing or rhonchi, normal breathing on RA  Abd: nondistended, normal BS, soft, nontender  Ext: decreasing peripheral edema, 1+ pitting, legs wrapped   Incision: clean, dry, intact, no erythema, " sternum stable  Tubes/drain sites: dressing clean and dry. Chest tube 200 ml/24 hrs. No air leak         Data:    Imaging:  reviewed recent imaging, no acute concerns, residual subcutaneous emphysema     CXR 5/23/2021: pending     Labs:  BMP  Recent Labs   Lab 05/23/21  0542 05/22/21  1718 05/22/21  0605 05/21/21  1646    132* 134 133   POTASSIUM 4.2 4.4 4.0 4.1   CHLORIDE 101 100 102 102   QAMAR 7.7* 7.8* 7.9* 7.4*   CO2 29 27 28 25   BUN 56* 57* 57* 59*   CR 1.77* 1.61* 1.60* 1.74*   * 142* 137* 229*     CBC  Recent Labs   Lab 05/23/21  0542 05/22/21  1206 05/22/21  0605 05/21/21  0441 05/20/21  0803   WBC 12.4*  --  13.9* 12.9* 15.7*   RBC 2.41*  --  2.41* 2.58* 2.73*   HGB 7.4* 7.4* 7.2* 8.0* 8.4*   HCT 23.7*  --  23.6* 25.7* 26.5*   MCV 98  --  98 100 97   MCH 30.7  --  29.9 31.0 30.8   MCHC 31.2*  --  30.5* 31.1* 31.7   RDW 17.1*  --  17.0* 16.8* 16.7*     --  252 275 305     INR  No lab results found in last 7 days.   Hepatic Panel  Recent Labs   Lab 05/20/21  0803 05/17/21  0530   AST 28 24   ALT 46 44   ALKPHOS 102 80   BILITOTAL 0.5 0.4   ALBUMIN 2.8* 2.5*     GLUCOSE:   Recent Labs   Lab 05/23/21  1243 05/23/21  0745 05/23/21  0542 05/22/21  2118 05/22/21  1901 05/22/21  1718 05/22/21  1251 05/22/21  0725 05/22/21  0605 05/21/21  1646 05/21/21  1646 05/21/21  0441 05/21/21  0441 05/20/21  1750 05/20/21 1750   GLC  --   --  148*  --   --  142*  --   --  137*  --  229*  --  172*  --  276*   * 143*  --  111* 115*  --  109* 139*  --    < >  --    < >  --    < >  --     < > = values in this interval not displayed.

## 2021-05-24 ENCOUNTER — APPOINTMENT (OUTPATIENT)
Dept: GENERAL RADIOLOGY | Facility: CLINIC | Age: 66
DRG: 001 | End: 2021-05-24
Attending: PHYSICIAN ASSISTANT
Payer: COMMERCIAL

## 2021-05-24 DIAGNOSIS — Z11.59 ENCOUNTER FOR SCREENING FOR OTHER VIRAL DISEASES: ICD-10-CM

## 2021-05-24 LAB
ALBUMIN SERPL-MCNC: 2.8 G/DL (ref 3.4–5)
ALP SERPL-CCNC: 147 U/L (ref 40–150)
ALT SERPL W P-5'-P-CCNC: 58 U/L (ref 0–70)
ANION GAP SERPL CALCULATED.3IONS-SCNC: 4 MMOL/L (ref 3–14)
ANION GAP SERPL CALCULATED.3IONS-SCNC: 6 MMOL/L (ref 3–14)
AST SERPL W P-5'-P-CCNC: 28 U/L (ref 0–45)
BILIRUB DIRECT SERPL-MCNC: 0.3 MG/DL (ref 0–0.2)
BILIRUB SERPL-MCNC: 0.8 MG/DL (ref 0.2–1.3)
BUN SERPL-MCNC: 52 MG/DL (ref 7–30)
BUN SERPL-MCNC: 59 MG/DL (ref 7–30)
CALCIUM SERPL-MCNC: 7.9 MG/DL (ref 8.5–10.1)
CALCIUM SERPL-MCNC: 8.2 MG/DL (ref 8.5–10.1)
CHLORIDE SERPL-SCNC: 103 MMOL/L (ref 94–109)
CHLORIDE SERPL-SCNC: 106 MMOL/L (ref 94–109)
CO2 SERPL-SCNC: 28 MMOL/L (ref 20–32)
CO2 SERPL-SCNC: 28 MMOL/L (ref 20–32)
CREAT SERPL-MCNC: 1.86 MG/DL (ref 0.66–1.25)
CREAT SERPL-MCNC: 1.97 MG/DL (ref 0.66–1.25)
CRP SERPL-MCNC: 4 MG/L (ref 0–8)
ERYTHROCYTE [DISTWIDTH] IN BLOOD BY AUTOMATED COUNT: 17.6 % (ref 10–15)
GFR SERPL CREATININE-BSD FRML MDRD: 35 ML/MIN/{1.73_M2}
GFR SERPL CREATININE-BSD FRML MDRD: 37 ML/MIN/{1.73_M2}
GLUCOSE BLDC GLUCOMTR-MCNC: 106 MG/DL (ref 70–99)
GLUCOSE BLDC GLUCOMTR-MCNC: 115 MG/DL (ref 70–99)
GLUCOSE BLDC GLUCOMTR-MCNC: 147 MG/DL (ref 70–99)
GLUCOSE BLDC GLUCOMTR-MCNC: 65 MG/DL (ref 70–99)
GLUCOSE BLDC GLUCOMTR-MCNC: 67 MG/DL (ref 70–99)
GLUCOSE BLDC GLUCOMTR-MCNC: 75 MG/DL (ref 70–99)
GLUCOSE BLDC GLUCOMTR-MCNC: 77 MG/DL (ref 70–99)
GLUCOSE BLDC GLUCOMTR-MCNC: 79 MG/DL (ref 70–99)
GLUCOSE SERPL-MCNC: 74 MG/DL (ref 70–99)
GLUCOSE SERPL-MCNC: 86 MG/DL (ref 70–99)
HCT VFR BLD AUTO: 26.8 % (ref 40–53)
HGB BLD-MCNC: 8.2 G/DL (ref 13.3–17.7)
MAGNESIUM SERPL-MCNC: 1.9 MG/DL (ref 1.6–2.3)
MAGNESIUM SERPL-MCNC: 2 MG/DL (ref 1.6–2.3)
MCH RBC QN AUTO: 29.9 PG (ref 26.5–33)
MCHC RBC AUTO-ENTMCNC: 30.6 G/DL (ref 31.5–36.5)
MCV RBC AUTO: 98 FL (ref 78–100)
PLATELET # BLD AUTO: 220 10E9/L (ref 150–450)
POTASSIUM SERPL-SCNC: 3.8 MMOL/L (ref 3.4–5.3)
POTASSIUM SERPL-SCNC: 3.9 MMOL/L (ref 3.4–5.3)
PROCALCITONIN SERPL-MCNC: 0.08 NG/ML
PROT SERPL-MCNC: 5.5 G/DL (ref 6.8–8.8)
RBC # BLD AUTO: 2.74 10E12/L (ref 4.4–5.9)
SODIUM SERPL-SCNC: 137 MMOL/L (ref 133–144)
SODIUM SERPL-SCNC: 138 MMOL/L (ref 133–144)
WBC # BLD AUTO: 14.8 10E9/L (ref 4–11)

## 2021-05-24 PROCEDURE — 74018 RADEX ABDOMEN 1 VIEW: CPT | Mod: 26 | Performed by: RADIOLOGY

## 2021-05-24 PROCEDURE — 250N000013 HC RX MED GY IP 250 OP 250 PS 637: Performed by: PHYSICIAN ASSISTANT

## 2021-05-24 PROCEDURE — 86140 C-REACTIVE PROTEIN: CPT | Performed by: PHYSICIAN ASSISTANT

## 2021-05-24 PROCEDURE — 84145 PROCALCITONIN (PCT): CPT | Performed by: PHYSICIAN ASSISTANT

## 2021-05-24 PROCEDURE — 99232 SBSQ HOSP IP/OBS MODERATE 35: CPT | Performed by: NURSE PRACTITIONER

## 2021-05-24 PROCEDURE — 250N000011 HC RX IP 250 OP 636: Performed by: STUDENT IN AN ORGANIZED HEALTH CARE EDUCATION/TRAINING PROGRAM

## 2021-05-24 PROCEDURE — 80076 HEPATIC FUNCTION PANEL: CPT | Performed by: NURSE PRACTITIONER

## 2021-05-24 PROCEDURE — 80076 HEPATIC FUNCTION PANEL: CPT | Performed by: PHYSICIAN ASSISTANT

## 2021-05-24 PROCEDURE — 250N000013 HC RX MED GY IP 250 OP 250 PS 637: Performed by: NURSE PRACTITIONER

## 2021-05-24 PROCEDURE — 80048 BASIC METABOLIC PNL TOTAL CA: CPT | Performed by: PHYSICIAN ASSISTANT

## 2021-05-24 PROCEDURE — 250N000013 HC RX MED GY IP 250 OP 250 PS 637: Performed by: SURGERY

## 2021-05-24 PROCEDURE — 36592 COLLECT BLOOD FROM PICC: CPT | Performed by: PHYSICIAN ASSISTANT

## 2021-05-24 PROCEDURE — 83735 ASSAY OF MAGNESIUM: CPT | Performed by: PHYSICIAN ASSISTANT

## 2021-05-24 PROCEDURE — 250N000009 HC RX 250: Performed by: STUDENT IN AN ORGANIZED HEALTH CARE EDUCATION/TRAINING PROGRAM

## 2021-05-24 PROCEDURE — 250N000012 HC RX MED GY IP 250 OP 636 PS 637: Performed by: NURSE PRACTITIONER

## 2021-05-24 PROCEDURE — 250N000012 HC RX MED GY IP 250 OP 636 PS 637: Performed by: SURGERY

## 2021-05-24 PROCEDURE — 250N000011 HC RX IP 250 OP 636: Performed by: PHYSICIAN ASSISTANT

## 2021-05-24 PROCEDURE — 258N000001 HC RX 258: Performed by: STUDENT IN AN ORGANIZED HEALTH CARE EDUCATION/TRAINING PROGRAM

## 2021-05-24 PROCEDURE — 71046 X-RAY EXAM CHEST 2 VIEWS: CPT | Mod: 26 | Performed by: STUDENT IN AN ORGANIZED HEALTH CARE EDUCATION/TRAINING PROGRAM

## 2021-05-24 PROCEDURE — 74018 RADEX ABDOMEN 1 VIEW: CPT

## 2021-05-24 PROCEDURE — 250N000011 HC RX IP 250 OP 636: Performed by: NURSE PRACTITIONER

## 2021-05-24 PROCEDURE — 999N000127 HC STATISTIC PERIPHERAL IV START W US GUIDANCE

## 2021-05-24 PROCEDURE — 250N000011 HC RX IP 250 OP 636

## 2021-05-24 PROCEDURE — 71046 X-RAY EXAM CHEST 2 VIEWS: CPT

## 2021-05-24 PROCEDURE — 999N001017 HC STATISTIC GLUCOSE BY METER IP

## 2021-05-24 PROCEDURE — 85027 COMPLETE CBC AUTOMATED: CPT | Performed by: PHYSICIAN ASSISTANT

## 2021-05-24 PROCEDURE — 214N000001 HC R&B CCU UMMC

## 2021-05-24 RX ORDER — LACTULOSE 10 G/10G
20 SOLUTION ORAL ONCE
Status: COMPLETED | OUTPATIENT
Start: 2021-05-24 | End: 2021-05-24

## 2021-05-24 RX ORDER — SCOLOPAMINE TRANSDERMAL SYSTEM 1 MG/1
1 PATCH, EXTENDED RELEASE TRANSDERMAL
Status: DISCONTINUED | OUTPATIENT
Start: 2021-05-24 | End: 2021-05-31 | Stop reason: HOSPADM

## 2021-05-24 RX ORDER — METHYLPREDNISOLONE SODIUM SUCCINATE 40 MG/ML
12 INJECTION, POWDER, LYOPHILIZED, FOR SOLUTION INTRAMUSCULAR; INTRAVENOUS EVERY EVENING
Status: DISCONTINUED | OUTPATIENT
Start: 2021-05-24 | End: 2021-05-28

## 2021-05-24 RX ORDER — METHYLPREDNISOLONE SODIUM SUCCINATE 40 MG/ML
16 INJECTION, POWDER, LYOPHILIZED, FOR SOLUTION INTRAMUSCULAR; INTRAVENOUS EVERY MORNING
Status: DISCONTINUED | OUTPATIENT
Start: 2021-05-25 | End: 2021-05-28

## 2021-05-24 RX ORDER — BUMETANIDE 2 MG/1
2 TABLET ORAL
Status: DISCONTINUED | OUTPATIENT
Start: 2021-05-24 | End: 2021-05-25

## 2021-05-24 RX ORDER — PANTOPRAZOLE SODIUM 40 MG/1
40 TABLET, DELAYED RELEASE ORAL
Status: DISCONTINUED | OUTPATIENT
Start: 2021-05-24 | End: 2021-05-25

## 2021-05-24 RX ADMIN — METHYLPREDNISOLONE SODIUM SUCCINATE 12 MG: 40 INJECTION, POWDER, FOR SOLUTION INTRAMUSCULAR; INTRAVENOUS at 23:04

## 2021-05-24 RX ADMIN — TACROLIMUS 2 MG: 1 CAPSULE ORAL at 18:18

## 2021-05-24 RX ADMIN — FLUTICASONE FUROATE AND VILANTEROL TRIFENATATE 1 PUFF: 100; 25 POWDER RESPIRATORY (INHALATION) at 07:50

## 2021-05-24 RX ADMIN — HYDROMORPHONE HYDROCHLORIDE 0.4 MG: 1 INJECTION, SOLUTION INTRAMUSCULAR; INTRAVENOUS; SUBCUTANEOUS at 03:15

## 2021-05-24 RX ADMIN — LACTULOSE 20 G: 10 POWDER, FOR SOLUTION ORAL at 14:51

## 2021-05-24 RX ADMIN — DEXTROSE MONOHYDRATE 50 ML: 500 INJECTION PARENTERAL at 20:18

## 2021-05-24 RX ADMIN — SODIUM CHLORIDE, PRESERVATIVE FREE 5 ML: 5 INJECTION INTRAVENOUS at 05:40

## 2021-05-24 RX ADMIN — MYCOPHENOLATE MOFETIL 1500 MG: 250 CAPSULE ORAL at 11:20

## 2021-05-24 RX ADMIN — ONDANSETRON 4 MG: 2 INJECTION INTRAMUSCULAR; INTRAVENOUS at 20:08

## 2021-05-24 RX ADMIN — UMECLIDINIUM 1 PUFF: 62.5 AEROSOL, POWDER ORAL at 07:50

## 2021-05-24 RX ADMIN — DEXTROSE MONOHYDRATE 25 ML: 500 INJECTION PARENTERAL at 11:11

## 2021-05-24 RX ADMIN — NYSTATIN 1000000 UNITS: 500000 SUSPENSION ORAL at 16:42

## 2021-05-24 RX ADMIN — HYDROMORPHONE HYDROCHLORIDE 0.4 MG: 1 INJECTION, SOLUTION INTRAMUSCULAR; INTRAVENOUS; SUBCUTANEOUS at 20:08

## 2021-05-24 RX ADMIN — PREDNISONE 20 MG: 20 TABLET ORAL at 11:38

## 2021-05-24 RX ADMIN — VALGANCICLOVIR 450 MG: 450 TABLET, FILM COATED ORAL at 12:21

## 2021-05-24 RX ADMIN — LEVOFLOXACIN 750 MG: 250 TABLET, FILM COATED ORAL at 12:20

## 2021-05-24 RX ADMIN — TACROLIMUS 3 MG: 1 CAPSULE ORAL at 11:06

## 2021-05-24 RX ADMIN — SCOPALAMINE 1 PATCH: 1 PATCH, EXTENDED RELEASE TRANSDERMAL at 22:28

## 2021-05-24 RX ADMIN — HEPARIN SODIUM 5000 UNITS: 5000 INJECTION, SOLUTION INTRAVENOUS; SUBCUTANEOUS at 16:52

## 2021-05-24 RX ADMIN — PROCHLORPERAZINE EDISYLATE 10 MG: 5 INJECTION INTRAMUSCULAR; INTRAVENOUS at 08:30

## 2021-05-24 RX ADMIN — SODIUM CHLORIDE, PRESERVATIVE FREE 5 ML: 5 INJECTION INTRAVENOUS at 03:16

## 2021-05-24 RX ADMIN — PROCHLORPERAZINE EDISYLATE 10 MG: 5 INJECTION INTRAMUSCULAR; INTRAVENOUS at 16:46

## 2021-05-24 RX ADMIN — HYDROMORPHONE HYDROCHLORIDE 0.4 MG: 1 INJECTION, SOLUTION INTRAMUSCULAR; INTRAVENOUS; SUBCUTANEOUS at 12:08

## 2021-05-24 RX ADMIN — ONDANSETRON 4 MG: 2 INJECTION INTRAMUSCULAR; INTRAVENOUS at 13:31

## 2021-05-24 RX ADMIN — HEPARIN SODIUM 5000 UNITS: 5000 INJECTION, SOLUTION INTRAVENOUS; SUBCUTANEOUS at 07:50

## 2021-05-24 RX ADMIN — ONDANSETRON 4 MG: 2 INJECTION INTRAMUSCULAR; INTRAVENOUS at 07:44

## 2021-05-24 ASSESSMENT — ACTIVITIES OF DAILY LIVING (ADL)
ADLS_ACUITY_SCORE: 12
ADLS_ACUITY_SCORE: 14
ADLS_ACUITY_SCORE: 14
ADLS_ACUITY_SCORE: 12
ADLS_ACUITY_SCORE: 14
ADLS_ACUITY_SCORE: 14

## 2021-05-24 ASSESSMENT — MIFFLIN-ST. JEOR: SCORE: 1827.74

## 2021-05-24 NOTE — PROGRESS NOTES
Neuro: A&Ox4. Pleasant, cooperative. Makes needs known.  Cardiac: Afebrile, VSS.            Respiratory: RA   GI/: Voiding in urinal. BM 5/22.  Diet/appetite: Tolerating meals. Nausea after evening meal, IV Zofran and Maalox given X 1 with some relief.  Activity: Up ad francisco.  Pain: . Left upper chest incision  PRN dilaudid oral given X 1 and tramadol X 1.  Skin: Incisions healing. Old CT sites leaks, changed dressing X 1.  Lines: DL PICC.   Drains: None.  , 194 with sliding scale insulin coverage.   Carlos Alberto wipes bath done per RN along with bed linen change.

## 2021-05-24 NOTE — PROGRESS NOTES
Shift [id number]    Vitals: VSS, afebrile  Neuro: A/O x 4. Sleepy this AM after IV zofran and compazine, and this PM after compazine.  Cardiac:  NSR, rates 90s            Respiratory:  O2 saturation > 92% on RA.   GI/: UO adequate via urinal. BG ACHS, BG this AM 67, 25 mL D50 IV given with recheck . CVTS notified, holding AM dose of lantus due to decreased oral intake. Lactulose given this AM, LBM 5/22, patient had emesis x3 after lactulose dose. Patient might try suppository later.  Diet/appetite: No appetite, nausea and vomiting throughout shift. IV zofran x2 and compazine x2 given without much relief. Patient only tolerated taking tacrolimus, cellcept, prednisone, levaquin and valcyte this AM. Patient having difficult time taking 1800 tacrolimus and prednisone d/t nausea.   Activity: SBA  Pain: Sharp pain in abdomen this AM, abdominal xray done with no obstruction seen,  Also having pain in middle of chest with swallowing, and incisonal pain left chest. Using IV diluadid to treat pain. CVTS aware  Skin: Sternal incision CRISPIN, old CT sites draining serous fluid, dressing changed x2.   LDA's:  PICC   Plan:  Will continue to monitor.

## 2021-05-24 NOTE — PLAN OF CARE
PT: cancel- pt w/ continued nausea today, politely declining session. Will reschedule for tomorrow.

## 2021-05-24 NOTE — PROGRESS NOTES
[facility]   Cardiology II Service / Advanced Heart Failure  Daily Progress Note  Date of Service: 5/24/2021      Patient: Jim Willingham  MRN: [id number]  Admission Date: 2/8/2021  Hospital Day # 105    Assessment and Plan: Jim Willingham is a 65 year old male with history of NICM EF 20% c/b VT s/p CRT-D s/p HMII LVAD 6/19/2017 originally intended as destination therapy 2/2 obesity however with recent weight loss now candidate for transplantation. He is admitted for decompensated heart failure with subsequent s/p OHT 5/6/21.     S/p OHT. 5/6/21 secondary to NICM. Echo 5/14/21 conistent with EF 65-70%, mildly decreased RV function, and dilated IVC. RHC 5/20/21 with mRA-11, RV-35/11, mPA-23, mPCWP-11, MICHELLE CO-6.2, and MICHELLE CI-2.7. DSA negative 5/13/21.  Immunosuppression: Simulect induction 5/6 and 5/10. Tac 3 mg in AM and 4 mg at HS, level 6.3. Goal 10-12. Prednisone 20 mg in AM and 15 mg in the evening. Cellcept 1500 mg po BID.   Serostatus: CMV: D+/R+, EBV: D+/R+, Toxo D-/R-  Prophylaxis: Dapsone, Nystatin, and Valcyte.   - Continue Crestor and ASA.   - Repeat RHC/biopsy 5/27.  - Decrease Bumex to 2 mg po BID, hold AM dose given poor oral intake.      Leukocytosis likely secondary to HCAP. Chest X-ray 5/12/21 consistent with small right apical pneumo, small left pleural effusion, and bilateral opacities. CT Chest positive for left lung base consolidation vs atelectasis. Chest X-ray 5/21/21 consistent with left retrocardiac opacity, atelectasis vs consolidation.   - WBC-14.8 (12.4). Exacerbation likely secondary to GI upset today.   - Levaquin 750 mg po day 3/5.     Right Apical Pneumothorax.   - Management per CVTS.     Dysphagia with Dyspepsia. History of issues with gastric bypass history.  - Abd X-ray negative.   - Consider GI consult.   ================================================================  Interval History/ROS: He complains of nausea with emesis last HS with inability to tolerate  "oral intake today. He denies fever, chills, chest pain, palpitations, RESENDIZ, cough, and diarrhea. He notes improving LE edema today. He is tolerating minimal oral intake this AM and tolerating ambulation.     Last 24 hr care team notes reviewed.   ROS:  4 point ROS including Respiratory, CV, GI and , other than that noted in the HPI, is negative.     Medications: Reviewed in EPIC.     Physical Exam:   /84 (BP Location: Left arm)   Pulse 96   Temp 97.3  F (36.3  C) (Oral)   Resp 16   Ht 1.727 m (5' 8\")   Wt 105.8 kg (233 lb 4.8 oz)   SpO2 96%   BMI 35.47 kg/m    GENERAL: Appears alert and oriented times three.   HEENT: Eye symmetrical and free of discharge bilaterally. Mucous membranes moist and without lesions.  NECK: Supple and without lymphadenopathy. JVD 8 cm    CV: RRR, S1S2 present without murmur, rub, or gallop.   RESPIRATORY: Respirations regular, even, and unlabored. Lungs CTA throughout.   GI: Soft and non distended with normoactive bowel sounds present in all quadrants. No tenderness, rebound, guarding. No organomegaly.   EXTREMITIES: +1 bilateral LE peripheral edema. 2+ bilateral pedal pulses.   NEUROLOGIC: Alert and orientated x 3. CN II-XII grossly intact. No focal deficits.   MUSCULOSKELETAL: No joint swelling or tenderness.   SKIN: No jaundice. No rashes or lesions.     Data:  CMP  Recent Labs   Lab 05/24/21  0544 05/23/21  1734 05/23/21  0542 05/22/21  1718 05/20/21  0803 05/20/21  0803    131* 135 132*   < > 130*   POTASSIUM 3.9 4.9 4.2 4.4   < > 4.2   CHLORIDE 103 100 101 100   < > 98   CO2 28 26 29 27   < > 29   ANIONGAP 6 5 5 5   < > 4   GLC 86 240* 148* 142*   < > 114*   BUN 59* 58* 56* 57*   < > 64*   CR 1.97* 1.98* 1.77* 1.61*   < > 2.03*   GFRESTIMATED 35* 35* 40* 45*   < > 34*   GFRESTBLACK 40* 40* 46* 52*   < > 39*   QAMAR 8.2* 7.8* 7.7* 7.8*   < > 7.9*   MAG 2.0 1.8 1.8 1.9   < > 2.1   PROTTOTAL 5.5*  --   --   --   --  5.3*   ALBUMIN 2.8*  --   --   --   --  2.8* "   BILITOTAL 0.8  --   --   --   --  0.5   ALKPHOS 147  --   --   --   --  102   AST 28  --   --   --   --  28   ALT 58  --   --   --   --  46    < > = values in this interval not displayed.     CBC  Recent Labs   Lab 05/24/21  0544 05/23/21  0542 05/22/21  1206 05/22/21  0605 05/21/21  0441   WBC 14.8* 12.4*  --  13.9* 12.9*   RBC 2.74* 2.41*  --  2.41* 2.58*   HGB 8.2* 7.4* 7.4* 7.2* 8.0*   HCT 26.8* 23.7*  --  23.6* 25.7*   MCV 98 98  --  98 100   MCH 29.9 30.7  --  29.9 31.0   MCHC 30.6* 31.2*  --  30.5* 31.1*   RDW 17.6* 17.1*  --  17.0* 16.8*    240  --  252 275     Patient discussed with Dr. Montgomery.     Soraya Marshall [facility]  5/24/2021

## 2021-05-24 NOTE — PLAN OF CARE
"/78 (BP Location: Left arm)   Pulse 93   Temp 98.7  F (37.1  C) (Oral)   Resp 16   Ht 1.727 m (5' 8\")   Wt 105.8 kg (233 lb 4.8 oz)   SpO2 96%   BMI 35.47 kg/m    Neuro: A&Ox4. Baseline neuropathy  Cardiac: Afebrile, VSS.   Respiratory: RA   GI/: Voiding spontaneously. No BM this shift. Reported pills don't seem to be dissolving as should. Last BM 5/22. Pt reports decreased in flatus. Sense of some distension. Abd soft. BS hypo.  No emesis, though spitting out saliva and fluids consumed. Oral suction set up. MD notified. Hx of gastric bypass  Diet/appetite: Tolerating sips of clears. Denies nausea   Activity: Up independent in room  Pain: .IV dilaudid admin x2 as pt did not feel should take pills  Skin: CT site gauze dressing changed x 1. Serous output. Other sites with small amt yellow thick drainage.  Lines: PICC hep locked. + blood return noted  Replacement: awaiting  am lab results.  Rested btwn cares, able to make needs known. Continue  POC.     "

## 2021-05-24 NOTE — PROGRESS NOTES
Cardiovascular Surgery Progress Note  05/24/2021         Assessment and Plan:     Mr. Jim Willingham is a 65 year old male with history of NICM EF 20% c/b VT s/p CRT-D s/p HMII LVAD 6/19/2017 originally intended as destination therapy 2/2 obesity however with recent weight loss now candidate for transplantation. He was admitted 2/8/21 for worsening functional status and renal function concerning for worsening heart failure. He was eventually listed status 2E for transplant and received a donor offer.   Jim is now s/p orthotopic heart transplant on 5/6/21 with Dr. Ronnie Quigley. He was shocked > 2x received amio, mag and lidocaine while coming off bypass. Known lung injury with isolated air leak to right pleural that eventually resolved, CT removed 5/23.      Cardiovascular:   Hx of NICM EF 20%, VT (ICD)  Hx HeartMate II LVAD 6/19/2017  Cardiorenal syndrome   S/P orthotopic heart transplant   No arrhythmia, HD stable.  Most recent echo showed LV EF 65-70% on 5/9/21.  Basiliximab 5/10.  Terbutaline weaned to off.    ASA 81 mg, Crestor 10 mg daily.   Diuresis with Bumex 2 mg PO BID, Cards II following. Diuresing as per Cards. Lymphedema wraps 5/18, improving.   Chest tubes: Airleak isolated to Right pleural. Had some CP after walking off suction 5/13 with associated subcutaneous emphysema, needed portable suction while ambulating and after improvement was able to ambulate OFF suction 5/19. He was kept to -10 mmHg while in room to reduce subcutaneous emphysema from getting worse. Very slow intermittent air leak 5/19, this resolved by 5/22. R pleural removed 5/23 without complication. Stable left hemidiaphragm elevation.   TPW: removed.       Pulmonary:  Hx COPD and CECILIA on CPAP  Left hemidiaphragm elevation   Air leak from Right pleural tube (unintended injury during surgery)   Possible HAP - persistent opacity in Left retrocardiac base  - PTA Breo ellipta, Incruse ellipta, and Singulair   - Extubated POD 1 to 4 lpm  via NC. Now saturating well on RA.   - Pulm toilet, IS, activity and deep breathing  - R pleural fluid Triglycerides normal on 5/17 (first day of increased drainage)  - Levaquin 750 mg every day for five days (since 5/22)      Neurology / MSK:  Hx Depression  - PTA bupropion and amitriptyline  Post-op Status  - Neuro intact  - Acute post-operative pain well controlled with acetaminophen, PO dilaudid PRN, IV dilaudid PRN  R Hip Pain   - Complaining of R hip pain increasing over past 2 days, keeping him from raising legs into bed independently. Pain with flexion and abduction. Hip Xray 5/16 without acute concerns, try Ice. Resolved.        / Renal:  WALTER on CKD stage III   - Cardiorenal syndrome improving. Most recent creatinine 1.97.    - Day before surgery weight 102.3 kg.      GI / FEN:   Hx Sylvester-en-Y gastric bypass  Non-severe Mild Protein-Calorie Malnutrition   - Regular diet with supplements, continue bowel regimen  - Replace electrolytes as needed, hepatic enzymes WNL.  - +BM 5/18 with lactulose. Resume 5/24 per patient request.      Endocrine:  DMT2, was on Insulin PTA  Gout   Stress induced hyperglycemia initially managed on insulin drip postop, transitioned to NPH BID and sliding scale. Converted to Lantus 5/14 am, continue prandial and high resistance sliding corrective scale.   - PTA allopurinol.   - Endocrine consulted, signed off 5/9  - Increased Lantus to 30 U qam 5/22, only 15 U 5/24 due to nausea and less intake.    Infectious Disease:  Stress induced leukocytosis  HAP, persistent left lower base opacity   - WBC 14.8, remains afebrile, no signs or symptoms of infection. UA clean 5/15. Procalcitonin and CRP both very low 5/24.   - CT Ch/ Ad/ Pv 5/18 done for persistent elevated WBC; small consolidative opacity in LL base with ground glass opacities in both lung bases, ? atypical infection with some degree of atelectasis. WBC has been decreasing since, saturating well, no cough, afebrile. CXR 5/22  "confirming his persistent elevated Left sandor-diaphragm.   - Started Levaquin 750 mg daily x 5 days.     Hematology:   Acute blood loss anemia and thrombocytopenia  Hgb 8.2 stable, Plts WNL, no signs or symptoms of active bleeding     Anticoagulation:   - ASA only     Prophylaxis:   - Stress ulcer prophylaxis: Pantoprazole 40 mg BID for Hx RNY and nausea   - DVT prophylaxis: Subcutaneous heparin, SCD     Disposition:   - Transferred to  on 5/10   - Therapies currently recommending discharge to ARU    Discussed with CVTS Fellow as needed.  Staff surgeons have been informed of changes through both verbal and written communication.      Nash Amado PA-C  Cardiothoracic Surgery  Pager [phone]    10:35 AM   May 24, 2021        Interval History:     Weekend events: some nausea after eating tuna sandwich 5/22, had emesis 5/24 am and threw up some tuna fish. Still some nausea though and self-restricting his diet to clear liquids. Has not had recent issues with nausea.     States pain is well managed on current regimen.   Slept well overnight.  Tolerating minimal diet, is passing flatus, + BM but not yesterday.  Breathing well without complaints.   Working with therapies and ambulating in halls without assistance.   Denies chest pain, palpitations, dizziness, syncopal symptoms, fevers, chills, myalgias, or sternal popping/clicking.         Physical Exam:   Blood pressure 123/84, pulse 96, temperature 97.3  F (36.3  C), temperature source Oral, resp. rate 16, height 1.727 m (5' 8\"), weight 105.8 kg (233 lb 4.8 oz), SpO2 96 %.  Vitals:    05/22/21 0600 05/23/21 0406 05/24/21 0307   Weight: 108.5 kg (239 lb 3.2 oz) 106.4 kg (234 lb 9.6 oz) 105.8 kg (233 lb 4.8 oz)      Weight; + 3 kg since surgery and trending down x 3 days.   24 hr Fluid status; net loss 280 mL.   MAPs: 83 - 95     Gen: A&Ox4, NAD  Neuro: no focal deficits   CV: RRR, normal S1 S2, no murmurs, rubs or gallops.   Pulm: normal LLL diminished, CTA, no " wheezing or rhonchi, normal breathing on RA  Abd: nondistended, normal BS, soft, nontender  Ext: improved peripheral edema  Incision: clean, dry, intact, no erythema, sternum stable  Tubes/drain sites: dressing clean and dry         Data:    Imaging:  reviewed recent imaging, no acute concerns, stool in intestines     Labs:  BMP  Recent Labs   Lab 05/24/21  0544 05/23/21  1734 05/23/21  0542 05/22/21  1718    131* 135 132*   POTASSIUM 3.9 4.9 4.2 4.4   CHLORIDE 103 100 101 100   QAMAR 8.2* 7.8* 7.7* 7.8*   CO2 28 26 29 27   BUN 59* 58* 56* 57*   CR 1.97* 1.98* 1.77* 1.61*   GLC 86 240* 148* 142*     CBC  Recent Labs   Lab 05/24/21  0544 05/23/21  0542 05/22/21  1206 05/22/21  0605 05/21/21  0441   WBC 14.8* 12.4*  --  13.9* 12.9*   RBC 2.74* 2.41*  --  2.41* 2.58*   HGB 8.2* 7.4* 7.4* 7.2* 8.0*   HCT 26.8* 23.7*  --  23.6* 25.7*   MCV 98 98  --  98 100   MCH 29.9 30.7  --  29.9 31.0   MCHC 30.6* 31.2*  --  30.5* 31.1*   RDW 17.6* 17.1*  --  17.0* 16.8*    240  --  252 275     INR  No lab results found in last 7 days.   Hepatic Panel  Recent Labs   Lab 05/24/21  0544 05/20/21  0803   AST 28 28   ALT 58 46   ALKPHOS 147 102   BILITOTAL 0.8 0.5   ALBUMIN 2.8* 2.8*     GLUCOSE:   Recent Labs   Lab 05/24/21  0759 05/24/21  0544 05/23/21  2140 05/23/21  1734 05/23/21  1627 05/23/21  1243 05/23/21  0745 05/23/21  0542 05/22/21  2118 05/22/21  1718 05/22/21  1718 05/22/21  0605 05/22/21  0605 05/21/21  1646 05/21/21  1646   GLC  --  86  --  240*  --   --   --  148*  --   --  142*  --  137*  --  229*   BGM 75  --  194*  --  147* 138* 143*  --  111*   < >  --    < >  --    < >  --     < > = values in this interval not displayed.      negative Affect and characteristics of appearance, verbalizations, behaviors are appropriate

## 2021-05-25 ENCOUNTER — APPOINTMENT (OUTPATIENT)
Dept: GENERAL RADIOLOGY | Facility: CLINIC | Age: 66
DRG: 001 | End: 2021-05-25
Attending: INTERNAL MEDICINE
Payer: COMMERCIAL

## 2021-05-25 ENCOUNTER — APPOINTMENT (OUTPATIENT)
Dept: GENERAL RADIOLOGY | Facility: CLINIC | Age: 66
DRG: 001 | End: 2021-05-25
Attending: PHYSICIAN ASSISTANT
Payer: COMMERCIAL

## 2021-05-25 PROBLEM — R91.8 LUNG FIELD ABNORMAL: Status: ACTIVE | Noted: 2021-05-25

## 2021-05-25 PROBLEM — Z79.2 NEED FOR PROPHYLACTIC ANTIBIOTIC: Status: ACTIVE | Noted: 2021-05-12

## 2021-05-25 LAB
ALBUMIN UR-MCNC: 20 MG/DL
ANION GAP SERPL CALCULATED.3IONS-SCNC: 8 MMOL/L (ref 3–14)
ANION GAP SERPL CALCULATED.3IONS-SCNC: 9 MMOL/L (ref 3–14)
APPEARANCE UR: CLEAR
BILIRUB UR QL STRIP: NEGATIVE
BUN SERPL-MCNC: 48 MG/DL (ref 7–30)
BUN SERPL-MCNC: 53 MG/DL (ref 7–30)
CALCIUM SERPL-MCNC: 8.1 MG/DL (ref 8.5–10.1)
CALCIUM SERPL-MCNC: 8.2 MG/DL (ref 8.5–10.1)
CHLORIDE SERPL-SCNC: 104 MMOL/L (ref 94–109)
CHLORIDE SERPL-SCNC: 105 MMOL/L (ref 94–109)
CO2 SERPL-SCNC: 25 MMOL/L (ref 20–32)
CO2 SERPL-SCNC: 26 MMOL/L (ref 20–32)
COLOR UR AUTO: YELLOW
CREAT SERPL-MCNC: 1.81 MG/DL (ref 0.66–1.25)
CREAT SERPL-MCNC: 1.94 MG/DL (ref 0.66–1.25)
ERYTHROCYTE [DISTWIDTH] IN BLOOD BY AUTOMATED COUNT: 18 % (ref 10–15)
GFR SERPL CREATININE-BSD FRML MDRD: 36 ML/MIN/{1.73_M2}
GFR SERPL CREATININE-BSD FRML MDRD: 39 ML/MIN/{1.73_M2}
GLUCOSE BLDC GLUCOMTR-MCNC: 109 MG/DL (ref 70–99)
GLUCOSE BLDC GLUCOMTR-MCNC: 132 MG/DL (ref 70–99)
GLUCOSE BLDC GLUCOMTR-MCNC: 142 MG/DL (ref 70–99)
GLUCOSE BLDC GLUCOMTR-MCNC: 164 MG/DL (ref 70–99)
GLUCOSE BLDC GLUCOMTR-MCNC: 198 MG/DL (ref 70–99)
GLUCOSE BLDC GLUCOMTR-MCNC: 80 MG/DL (ref 70–99)
GLUCOSE SERPL-MCNC: 108 MG/DL (ref 70–99)
GLUCOSE SERPL-MCNC: 154 MG/DL (ref 70–99)
GLUCOSE UR STRIP-MCNC: NEGATIVE MG/DL
GRAM STN SPEC: NORMAL
HCT VFR BLD AUTO: 27.5 % (ref 40–53)
HGB BLD-MCNC: 8.2 G/DL (ref 13.3–17.7)
HGB UR QL STRIP: NEGATIVE
KETONES UR STRIP-MCNC: NEGATIVE MG/DL
L PNEUMO1 AG UR QL IA: NORMAL
LACTATE BLD-SCNC: 1 MMOL/L (ref 0.7–2)
LEUKOCYTE ESTERASE UR QL STRIP: NEGATIVE
Lab: NORMAL
MAGNESIUM SERPL-MCNC: 2 MG/DL (ref 1.6–2.3)
MAGNESIUM SERPL-MCNC: 2 MG/DL (ref 1.6–2.3)
MCH RBC QN AUTO: 30 PG (ref 26.5–33)
MCHC RBC AUTO-ENTMCNC: 29.8 G/DL (ref 31.5–36.5)
MCV RBC AUTO: 101 FL (ref 78–100)
MUCOUS THREADS #/AREA URNS LPF: PRESENT /LPF
NITRATE UR QL: NEGATIVE
PH UR STRIP: 5.5 PH (ref 5–7)
PLATELET # BLD AUTO: 187 10E9/L (ref 150–450)
POTASSIUM SERPL-SCNC: 3.9 MMOL/L (ref 3.4–5.3)
POTASSIUM SERPL-SCNC: 4.4 MMOL/L (ref 3.4–5.3)
RBC # BLD AUTO: 2.73 10E12/L (ref 4.4–5.9)
RBC #/AREA URNS AUTO: 1 /HPF (ref 0–2)
SODIUM SERPL-SCNC: 139 MMOL/L (ref 133–144)
SODIUM SERPL-SCNC: 139 MMOL/L (ref 133–144)
SOURCE: ABNORMAL
SP GR UR STRIP: 1.02 (ref 1–1.03)
SPECIMEN SOURCE: NORMAL
SPECIMEN SOURCE: NORMAL
SQUAMOUS #/AREA URNS AUTO: 1 /HPF (ref 0–1)
TACROLIMUS BLD-MCNC: 6.4 UG/L (ref 5–15)
TME LAST DOSE: NORMAL H
UROBILINOGEN UR STRIP-MCNC: NORMAL MG/DL (ref 0–2)
WBC # BLD AUTO: 17.2 10E9/L (ref 4–11)
WBC #/AREA URNS AUTO: 4 /HPF (ref 0–5)

## 2021-05-25 PROCEDURE — 87305 ASPERGILLUS AG IA: CPT | Performed by: INTERNAL MEDICINE

## 2021-05-25 PROCEDURE — 94640 AIRWAY INHALATION TREATMENT: CPT | Mod: 76

## 2021-05-25 PROCEDURE — 99223 1ST HOSP IP/OBS HIGH 75: CPT | Mod: GC | Performed by: INTERNAL MEDICINE

## 2021-05-25 PROCEDURE — 250N000009 HC RX 250: Performed by: PHYSICIAN ASSISTANT

## 2021-05-25 PROCEDURE — 36592 COLLECT BLOOD FROM PICC: CPT | Performed by: SURGERY

## 2021-05-25 PROCEDURE — 85027 COMPLETE CBC AUTOMATED: CPT | Performed by: PHYSICIAN ASSISTANT

## 2021-05-25 PROCEDURE — 36415 COLL VENOUS BLD VENIPUNCTURE: CPT | Performed by: INTERNAL MEDICINE

## 2021-05-25 PROCEDURE — 250N000009 HC RX 250: Performed by: SURGERY

## 2021-05-25 PROCEDURE — 250N000011 HC RX IP 250 OP 636

## 2021-05-25 PROCEDURE — 87205 SMEAR GRAM STAIN: CPT | Performed by: PHYSICIAN ASSISTANT

## 2021-05-25 PROCEDURE — 250N000009 HC RX 250

## 2021-05-25 PROCEDURE — 250N000013 HC RX MED GY IP 250 OP 250 PS 637: Performed by: STUDENT IN AN ORGANIZED HEALTH CARE EDUCATION/TRAINING PROGRAM

## 2021-05-25 PROCEDURE — 250N000009 HC RX 250: Performed by: NURSE PRACTITIONER

## 2021-05-25 PROCEDURE — 71045 X-RAY EXAM CHEST 1 VIEW: CPT

## 2021-05-25 PROCEDURE — 999N001017 HC STATISTIC GLUCOSE BY METER IP

## 2021-05-25 PROCEDURE — 999N000157 HC STATISTIC RCP TIME EA 10 MIN

## 2021-05-25 PROCEDURE — 74018 RADEX ABDOMEN 1 VIEW: CPT | Mod: 26 | Performed by: RADIOLOGY

## 2021-05-25 PROCEDURE — 74018 RADEX ABDOMEN 1 VIEW: CPT

## 2021-05-25 PROCEDURE — 250N000013 HC RX MED GY IP 250 OP 250 PS 637: Performed by: NURSE PRACTITIONER

## 2021-05-25 PROCEDURE — 83605 ASSAY OF LACTIC ACID: CPT | Performed by: SURGERY

## 2021-05-25 PROCEDURE — 94640 AIRWAY INHALATION TREATMENT: CPT

## 2021-05-25 PROCEDURE — 258N000003 HC RX IP 258 OP 636: Performed by: NURSE PRACTITIONER

## 2021-05-25 PROCEDURE — 250N000013 HC RX MED GY IP 250 OP 250 PS 637: Performed by: SURGERY

## 2021-05-25 PROCEDURE — 214N000001 HC R&B CCU UMMC

## 2021-05-25 PROCEDURE — 250N000011 HC RX IP 250 OP 636: Performed by: NURSE PRACTITIONER

## 2021-05-25 PROCEDURE — 74018 RADEX ABDOMEN 1 VIEW: CPT | Mod: 76

## 2021-05-25 PROCEDURE — 258N000001 HC RX 258: Performed by: STUDENT IN AN ORGANIZED HEALTH CARE EDUCATION/TRAINING PROGRAM

## 2021-05-25 PROCEDURE — 71045 X-RAY EXAM CHEST 1 VIEW: CPT | Mod: 26 | Performed by: RADIOLOGY

## 2021-05-25 PROCEDURE — 258N000003 HC RX IP 258 OP 636

## 2021-05-25 PROCEDURE — 36415 COLL VENOUS BLD VENIPUNCTURE: CPT | Performed by: PHYSICIAN ASSISTANT

## 2021-05-25 PROCEDURE — 250N000013 HC RX MED GY IP 250 OP 250 PS 637: Performed by: PHYSICIAN ASSISTANT

## 2021-05-25 PROCEDURE — 250N000011 HC RX IP 250 OP 636: Performed by: PHYSICIAN ASSISTANT

## 2021-05-25 PROCEDURE — 83735 ASSAY OF MAGNESIUM: CPT | Performed by: PHYSICIAN ASSISTANT

## 2021-05-25 PROCEDURE — 43752 NASAL/OROGASTRIC W/TUBE PLMT: CPT

## 2021-05-25 PROCEDURE — 80197 ASSAY OF TACROLIMUS: CPT | Performed by: NURSE PRACTITIONER

## 2021-05-25 PROCEDURE — 80048 BASIC METABOLIC PNL TOTAL CA: CPT | Performed by: PHYSICIAN ASSISTANT

## 2021-05-25 PROCEDURE — 250N000011 HC RX IP 250 OP 636: Performed by: STUDENT IN AN ORGANIZED HEALTH CARE EDUCATION/TRAINING PROGRAM

## 2021-05-25 PROCEDURE — 43752 NASAL/OROGASTRIC W/TUBE PLMT: CPT | Mod: GC | Performed by: RADIOLOGY

## 2021-05-25 PROCEDURE — 81001 URINALYSIS AUTO W/SCOPE: CPT | Performed by: PHYSICIAN ASSISTANT

## 2021-05-25 PROCEDURE — 87070 CULTURE OTHR SPECIMN AEROBIC: CPT | Performed by: PHYSICIAN ASSISTANT

## 2021-05-25 PROCEDURE — 87449 NOS EACH ORGANISM AG IA: CPT | Performed by: INTERNAL MEDICINE

## 2021-05-25 PROCEDURE — 99233 SBSQ HOSP IP/OBS HIGH 50: CPT | Performed by: NURSE PRACTITIONER

## 2021-05-25 PROCEDURE — 87899 AGENT NOS ASSAY W/OPTIC: CPT | Performed by: PHYSICIAN ASSISTANT

## 2021-05-25 PROCEDURE — 80197 ASSAY OF TACROLIMUS: CPT | Performed by: PHYSICIAN ASSISTANT

## 2021-05-25 RX ORDER — BUPROPION HCL 100 MG
50 TABLET ORAL EVERY EVENING
Status: DISCONTINUED | OUTPATIENT
Start: 2021-05-25 | End: 2021-05-31 | Stop reason: HOSPADM

## 2021-05-25 RX ORDER — GABAPENTIN 250 MG/5ML
300 SOLUTION ORAL DAILY
Status: DISCONTINUED | OUTPATIENT
Start: 2021-05-26 | End: 2021-05-27

## 2021-05-25 RX ORDER — PIPERACILLIN SODIUM, TAZOBACTAM SODIUM 3; .375 G/15ML; G/15ML
3.38 INJECTION, POWDER, LYOPHILIZED, FOR SOLUTION INTRAVENOUS EVERY 6 HOURS
Status: DISCONTINUED | OUTPATIENT
Start: 2021-05-25 | End: 2021-05-31 | Stop reason: HOSPADM

## 2021-05-25 RX ORDER — BISACODYL 10 MG
10 SUPPOSITORY, RECTAL RECTAL ONCE
Status: COMPLETED | OUTPATIENT
Start: 2021-05-25 | End: 2021-05-25

## 2021-05-25 RX ORDER — LIDOCAINE HYDROCHLORIDE 20 MG/ML
5 SOLUTION OROPHARYNGEAL ONCE
Status: COMPLETED | OUTPATIENT
Start: 2021-05-25 | End: 2021-05-25

## 2021-05-25 RX ORDER — IPRATROPIUM BROMIDE AND ALBUTEROL SULFATE 2.5; .5 MG/3ML; MG/3ML
3 SOLUTION RESPIRATORY (INHALATION)
Status: DISCONTINUED | OUTPATIENT
Start: 2021-05-25 | End: 2021-05-26

## 2021-05-25 RX ORDER — IPRATROPIUM BROMIDE AND ALBUTEROL SULFATE 2.5; .5 MG/3ML; MG/3ML
3 SOLUTION RESPIRATORY (INHALATION)
Status: DISCONTINUED | OUTPATIENT
Start: 2021-05-25 | End: 2021-05-25

## 2021-05-25 RX ORDER — BISACODYL 10 MG
10 SUPPOSITORY, RECTAL RECTAL DAILY PRN
Status: DISCONTINUED | OUTPATIENT
Start: 2021-05-25 | End: 2021-05-25

## 2021-05-25 RX ORDER — ACETAMINOPHEN 325 MG/10.15ML
975 LIQUID ORAL EVERY 8 HOURS
Status: DISCONTINUED | OUTPATIENT
Start: 2021-05-25 | End: 2021-05-27

## 2021-05-25 RX ORDER — VALGANCICLOVIR HYDROCHLORIDE 50 MG/ML
450 POWDER, FOR SOLUTION ORAL DAILY
Status: DISCONTINUED | OUTPATIENT
Start: 2021-05-25 | End: 2021-05-27

## 2021-05-25 RX ORDER — GABAPENTIN 250 MG/5ML
600 SOLUTION ORAL 2 TIMES DAILY
Status: DISCONTINUED | OUTPATIENT
Start: 2021-05-25 | End: 2021-05-27

## 2021-05-25 RX ORDER — BUPROPION HYDROCHLORIDE 100 MG/1
100 TABLET, EXTENDED RELEASE ORAL 2 TIMES DAILY
Status: DISCONTINUED | OUTPATIENT
Start: 2021-05-25 | End: 2021-05-25

## 2021-05-25 RX ORDER — HYDROMORPHONE HYDROCHLORIDE 2 MG/1
2-4 TABLET ORAL EVERY 4 HOURS PRN
Status: DISCONTINUED | OUTPATIENT
Start: 2021-05-25 | End: 2021-05-31 | Stop reason: HOSPADM

## 2021-05-25 RX ORDER — BUPROPION HYDROCHLORIDE 75 MG/1
75 TABLET ORAL 2 TIMES DAILY
Status: DISCONTINUED | OUTPATIENT
Start: 2021-05-25 | End: 2021-05-31 | Stop reason: HOSPADM

## 2021-05-25 RX ADMIN — PIPERACILLIN AND TAZOBACTAM 3.38 G: 3; .375 INJECTION, POWDER, LYOPHILIZED, FOR SOLUTION INTRAVENOUS at 21:00

## 2021-05-25 RX ADMIN — HEPARIN SODIUM 5000 UNITS: 5000 INJECTION, SOLUTION INTRAVENOUS; SUBCUTANEOUS at 00:53

## 2021-05-25 RX ADMIN — PROCHLORPERAZINE EDISYLATE 10 MG: 5 INJECTION INTRAMUSCULAR; INTRAVENOUS at 01:06

## 2021-05-25 RX ADMIN — PANTOPRAZOLE SODIUM 40 MG: 40 TABLET, DELAYED RELEASE ORAL at 16:23

## 2021-05-25 RX ADMIN — DEXTROSE MONOHYDRATE 25 ML: 500 INJECTION PARENTERAL at 01:48

## 2021-05-25 RX ADMIN — Medication 37.5 MG: at 22:09

## 2021-05-25 RX ADMIN — IPRATROPIUM BROMIDE AND ALBUTEROL SULFATE 3 ML: .5; 3 SOLUTION RESPIRATORY (INHALATION) at 07:58

## 2021-05-25 RX ADMIN — TACROLIMUS 110 MCG/HR: 5 INJECTION, SOLUTION INTRAVENOUS at 22:00

## 2021-05-25 RX ADMIN — SODIUM CHLORIDE, PRESERVATIVE FREE 5 ML: 5 INJECTION INTRAVENOUS at 04:40

## 2021-05-25 RX ADMIN — IPRATROPIUM BROMIDE AND ALBUTEROL SULFATE 3 ML: .5; 3 SOLUTION RESPIRATORY (INHALATION) at 12:15

## 2021-05-25 RX ADMIN — PIPERACILLIN AND TAZOBACTAM 3.38 G: 3; .375 INJECTION, POWDER, LYOPHILIZED, FOR SOLUTION INTRAVENOUS at 09:07

## 2021-05-25 RX ADMIN — NYSTATIN 1000000 UNITS: 500000 SUSPENSION ORAL at 22:01

## 2021-05-25 RX ADMIN — MYCOPHENOLATE MOFETIL 1500 MG: 500 INJECTION, POWDER, LYOPHILIZED, FOR SOLUTION INTRAVENOUS at 10:17

## 2021-05-25 RX ADMIN — HYDROMORPHONE HYDROCHLORIDE 0.4 MG: 1 INJECTION, SOLUTION INTRAMUSCULAR; INTRAVENOUS; SUBCUTANEOUS at 03:19

## 2021-05-25 RX ADMIN — PROCHLORPERAZINE EDISYLATE 10 MG: 5 INJECTION INTRAMUSCULAR; INTRAVENOUS at 07:54

## 2021-05-25 RX ADMIN — IPRATROPIUM BROMIDE AND ALBUTEROL SULFATE 3 ML: .5; 3 SOLUTION RESPIRATORY (INHALATION) at 21:47

## 2021-05-25 RX ADMIN — DIATRIZOATE MEGLUMINE AND DIATRIZOATE SODIUM 75 ML: 660; 100 SOLUTION ORAL; RECTAL at 14:05

## 2021-05-25 RX ADMIN — ALBUTEROL SULFATE 2 PUFF: 90 INHALANT RESPIRATORY (INHALATION) at 03:11

## 2021-05-25 RX ADMIN — ACETAMINOPHEN ORAL SOLUTION 975 MG: 325 SOLUTION ORAL at 16:22

## 2021-05-25 RX ADMIN — NYSTATIN 1000000 UNITS: 500000 SUSPENSION ORAL at 12:43

## 2021-05-25 RX ADMIN — METHYLPREDNISOLONE SODIUM SUCCINATE 12 MG: 40 INJECTION, POWDER, FOR SOLUTION INTRAMUSCULAR; INTRAVENOUS at 22:02

## 2021-05-25 RX ADMIN — MONTELUKAST 10 MG: 10 TABLET, FILM COATED ORAL at 21:00

## 2021-05-25 RX ADMIN — LIDOCAINE HYDROCHLORIDE 15 ML: 20 SOLUTION ORAL; TOPICAL at 11:36

## 2021-05-25 RX ADMIN — NYSTATIN 1000000 UNITS: 500000 SUSPENSION ORAL at 16:22

## 2021-05-25 RX ADMIN — ONDANSETRON 4 MG: 2 INJECTION INTRAMUSCULAR; INTRAVENOUS at 03:19

## 2021-05-25 RX ADMIN — METHYLPREDNISOLONE SODIUM SUCCINATE 16 MG: 40 INJECTION, POWDER, FOR SOLUTION INTRAMUSCULAR; INTRAVENOUS at 09:08

## 2021-05-25 RX ADMIN — SODIUM CHLORIDE, PRESERVATIVE FREE 5 ML: 5 INJECTION INTRAVENOUS at 08:27

## 2021-05-25 RX ADMIN — HYDROMORPHONE HYDROCHLORIDE 0.2 MG: 0.2 INJECTION, SOLUTION INTRAMUSCULAR; INTRAVENOUS; SUBCUTANEOUS at 12:43

## 2021-05-25 RX ADMIN — Medication 1 TABLET: at 21:00

## 2021-05-25 RX ADMIN — MYCOPHENOLATE MOFETIL 1500 MG: 500 INJECTION, POWDER, LYOPHILIZED, FOR SOLUTION INTRAVENOUS at 00:54

## 2021-05-25 RX ADMIN — Medication 50 MG: at 22:03

## 2021-05-25 RX ADMIN — HEPARIN SODIUM 5000 UNITS: 5000 INJECTION, SOLUTION INTRAVENOUS; SUBCUTANEOUS at 16:23

## 2021-05-25 RX ADMIN — IPRATROPIUM BROMIDE AND ALBUTEROL SULFATE 3 ML: .5; 3 SOLUTION RESPIRATORY (INHALATION) at 16:02

## 2021-05-25 RX ADMIN — VALGANCICLOVIR HYDROCHLORIDE 450 MG: 50 POWDER, FOR SOLUTION ORAL at 17:30

## 2021-05-25 RX ADMIN — PIPERACILLIN AND TAZOBACTAM 3.38 G: 3; .375 INJECTION, POWDER, LYOPHILIZED, FOR SOLUTION INTRAVENOUS at 14:05

## 2021-05-25 RX ADMIN — SENNOSIDES AND DOCUSATE SODIUM 1 TABLET: 8.6; 5 TABLET ORAL at 21:00

## 2021-05-25 RX ADMIN — GABAPENTIN 600 MG: 250 SUSPENSION ORAL at 16:22

## 2021-05-25 RX ADMIN — BUPROPION HYDROCHLORIDE 75 MG: 75 TABLET, FILM COATED ORAL at 16:24

## 2021-05-25 RX ADMIN — TACROLIMUS 100 MCG/HR: 5 INJECTION, SOLUTION INTRAVENOUS at 01:04

## 2021-05-25 RX ADMIN — MYCOPHENOLATE MOFETIL 1000 MG: 500 INJECTION, POWDER, LYOPHILIZED, FOR SOLUTION INTRAVENOUS at 22:34

## 2021-05-25 RX ADMIN — FLUTICASONE FUROATE AND VILANTEROL TRIFENATATE 1 PUFF: 100; 25 POWDER RESPIRATORY (INHALATION) at 07:54

## 2021-05-25 RX ADMIN — BISACODYL 10 MG: 10 SUPPOSITORY RECTAL at 08:10

## 2021-05-25 RX ADMIN — UMECLIDINIUM 1 PUFF: 62.5 AEROSOL, POWDER ORAL at 07:54

## 2021-05-25 ASSESSMENT — MIFFLIN-ST. JEOR: SCORE: 1800.98

## 2021-05-25 ASSESSMENT — ACTIVITIES OF DAILY LIVING (ADL)
ADLS_ACUITY_SCORE: 13
ADLS_ACUITY_SCORE: 12

## 2021-05-25 NOTE — CONSULTS
[facility]  Transplant Infectious Disease Consult Note - New Patient     Patient:  Jim Willingham, Date of birth 1955, Medical record number [id number]  Date of Visit:  05/25/2021  Consult requested by Dr. Ronnie Quigley for evaluation of pneumonia         Assessment and Recommendations:   Recommendations:    Continue zosyn for now.  Pending clinical course, will be able to narrow.  Additional workup as below (ordered for you), follow up results  - AFB blood culture  - Fungitell  - Galactomannan aspergillus Ag  - Histoplasma urine Ag  - Cryptococcus Ag  - Legionella Urine Ag  Restart oral dapsone when GI issues resolve.  Good pulmonary toilet when able.    Thank you very much for this consultation. Transplant Infectious Disease will continue to follow with you.    Assessment:    Jim Willingham is a 66 yo M who underwent orthotopic heart transplantation 5/6/21, previously had a HeartMate II LVAD for non ischemic cardiomyopathy.  Transplant ID has been consulted for potential aspiration vs HCAP pneumonia.    Patient has had persistent leukocytosis since transplant, largely due to steroids.  In the last several days his WBC has been trending up.  He has had chest imaging that shows a slightly elevated left sided hemidiaphragm with some bilateral opacities and small bilateral pleural effusions.  Notable when right heart catheterizations have been performed he has been intravascularly euvolemic but has some third space edema from hypoalbuminemia.  A chest CT from 5/18 showed Consolidative opacity in the left lung base with groundglassopacities in both lung bases concerning for infection including atypical infection such as aspiration.  Aspiration would be less likely considering his right side looks ok, but not impossible.      For time being, can continue empiric antibiotics with zosyn for potential hospital acquired pneumonia, which was transitioned from levofloxacin today.  We will workup  additional fungal issues as well, including aspergillus, histoplasma, cryptococcus, with a fungitell.  Will also add on AFB blood cultures and legionella urine Ag    Immunosuppression - currently on tacrolimus (goal 10-12) and cellcept 1500mg BID in addition to prolonged steroid taper (currently 20 AM, 15 PM).  He did receive basilixumab protocol    Infectious Disease issues include:  - Serostatus: CMV: D+/R+, EBV: D+/R+, Toxo D-/R-  - QTc interval: 449  - Pneumocystis prophylaxis: Dapsone 100 (increased from 50 5/22), currently off due to potential bowel obstruction, will need to restart when able  - Viral serostatus & prophylaxis: Valcyte 450 mg  - Fungal prophylaxis: Nystatin  - Isolation status: Good hand hygiene.    Discussed with attending Dr. Rutherford.    Riaz Montaño MD   Pager [phone]         History of Infectious Disease Illness:     Jim Willingham is a 64 yo M who underwent orthotopic heart transplantation 5/6/21, previously had a HeartMate II LVAD for non ischemic cardiomyopathy.  Transplant ID has been consulted for potential aspiration vs HCAP pneumonia.    Jim waited in the hospital for his heart.  Surgery was relatively uncomplicated.  He has been doing well otherwise with normal graft function and no rejection.  Due to increased leukocytosis and chest imaging showing possible pneumonia, he was started on levofloxacin 3 days ago.  More recently he developed issues with nausea and vomiting and signs concerning a bowel obstruction.  Inpatient team is working on getting a NG tube for decompression.      Transplants:  5/6/2021 (Heart), Postoperative day:  19.  Coordinator Yolette Rueda    Review of Systems:  CONSTITUTIONAL:  No fevers or chills  EYES: negative for icterus or acute vision changes  ENT:  negative for acute hearing loss, tinnitus, sore throat  RESPIRATORY:  Occasional shortness of breath requiring supplemental oxygen  CARDIOVASCULAR:  negative for chest pain,  palpitations  GASTROINTESTINAL:  Yes for nausea, abdominal pain, vomiting  GENITOURINARY:  negative for dysuria or hematuria  HEME:  No easy bruising or bleeding  INTEGUMENT:  negative for rash or pruritus  NEURO:  Negative for headache or tremor    Past Medical History:   Diagnosis Date     Bariatric surgery status 2003     Benign essential hypertension 05/11/2017     Bilateral carpal tunnel syndrome 11/02/2020     Cardiomyopathy, unspecified (H) 05/08/2017     CKD (chronic kidney disease) stage 3, GFR 30-59 ml/min 05/11/2017     COPD (chronic obstructive pulmonary disease) (H) 11/02/2020     Depression 05/11/2017     Diabetes mellitus (H) 1995     Gouty arthropathy, chronic, without tophi 11/02/2020     H/O gastric bypass 05/11/2017     ICD (implantable cardioverter-defibrillator), biventricular, in situ 05/11/2017     LVAD (left ventricular assist device) present (H)      Major depression, recurrent, chronic (H) 11/02/2020     NICM (nonischemic cardiomyopathy) (H)/ EF 20% 05/11/2017    ECHO: LVEDd. 7.66 cm, Restrictive pattern , Severe mitral valve regurgitation     CECILIA (obstructive sleep apnea) 05/11/2017     Paroxysmal atrial fibrillation (H) 05/11/2017     Paroxysmal VT (H) 05/11/2017     Rotator cuff tear arthropathy of both shoulders 11/02/2020     Uncomplicated asthma      Vitamin B12 deficiency (non anemic) 05/11/2017       Past Surgical History:   Procedure Laterality Date     ANESTHESIA CARDIOVERSION N/A 05/11/2020    Procedure: ANESTHESIA, FOR CARDIOVERSION @1100;  Surgeon: GENERIC ANESTHESIA PROVIDER;  Location: [facility] OR     CV HEART BIOPSY N/A 5/13/2021    Procedure: Heart Biopsy;  Surgeon: cSout Robins MD;  Location: [facility] HEART CARDIAC CATH LAB     CV HEART BIOPSY N/A 5/20/2021    Procedure: Heart Biopsy;  Surgeon: Jeffrey Gibson MD;  Location: [facility] HEART CARDIAC CATH LAB     CV RIGHT HEART CATH MEASUREMENTS RECORDED N/A 07/24/2019    Procedure: CV RIGHT HEART CATH;  Surgeon:  Renu Sears MD;  Location:  HEART CARDIAC CATH LAB     CV RIGHT HEART CATH MEASUREMENTS RECORDED N/A 08/05/2020    Procedure: CV RIGHT HEART CATH;  Surgeon: Nicola Seth MD;  Location:  HEART CARDIAC CATH LAB     CV RIGHT HEART CATH MEASUREMENTS RECORDED N/A 01/07/2021    Procedure: CV RIGHT HEART CATH;  Surgeon: Jac Dover MD;  Location:  HEART CARDIAC CATH LAB     CV RIGHT HEART CATH MEASUREMENTS RECORDED N/A 02/23/2021    Procedure: Heart Cath Right Heart Cath;  Surgeon: Jeffrey Gibson MD;  Location:  HEART CARDIAC CATH LAB     CV RIGHT HEART CATH MEASUREMENTS RECORDED N/A 03/23/2021    Procedure: Heart Cath Right Heart Cath. request for 3/23;  Surgeon: Jeffrey Gibson MD;  Location:  HEART CARDIAC CATH LAB     CV RIGHT HEART CATH MEASUREMENTS RECORDED N/A 5/13/2021    Procedure: Right Heart Cath;  Surgeon: Scout Robins MD;  Location:  HEART CARDIAC CATH LAB     CV RIGHT HEART CATH MEASUREMENTS RECORDED N/A 5/20/2021    Procedure: Right Heart Cath;  Surgeon: Jeffrey Gibson MD;  Location:  HEART CARDIAC CATH LAB     GI SURGERY  2003    [facility]     INSERT VENTRICULAR ASSIST DEVICE LEFT (HEARTMATE II) N/A 06/19/2017    Procedure: INSERT VENTRICULAR ASSIST DEVICE LEFT (HEARTMATE II);  Median Sternotomy Heartmate II Left Ventricular Assist Device Insertion on Pump Oxygenator;  Surgeon: Ronnie Quigley MD;  Location:  OR     ORTHOPEDIC SURGERY  1994    right knee wired     PICC DOUBLE LUMEN PLACEMENT Right 09/23/2020    5FR PICC DL. Length-43cm (1cm out).     PICC INSERTION - DOUBLE LUMEN Right 05/09/2021    IK/BRACH     RELEASE CARPAL TUNNEL BILATERAL Bilateral 02/18/2021    Procedure: Bilateral carpal tunnel release;  Surgeon: Jermaine Brand MD;  Location:  OR     TRANSPLANT HEART RECIPIENT N/A 05/05/2021    Procedure: Redo median sternotomy, lysis of adhesions, heart transplant recipient, on cardiopulmonary bypass,  intraoperative transesophageal echocardiogram per anesthesia, Implantable Cardioverter Defibrillator (ICD) removal;  Surgeon: Ronnie Quigley MD;  Location: [facility] OR       Family History   Problem Relation Age of Onset     Cerebrovascular Disease Mother 64     Diabetes Mother      Hypertension Mother      Coronary Artery Disease Father      Diabetes Type 2  Father      Obesity Brother      Obesity Brother      Cerebrovascular Disease Daughter 40       Social History     Social History Narrative     Not on file     Social History     Tobacco Use     Smoking status: Former Smoker     Quit date:      Years since quittin.4     Smokeless tobacco: Never Used   Substance Use Topics     Alcohol use: No     Drug use: No       Immunization History   Administered Date(s) Administered     Influenza Quad, Recombinant, p-free (RIV4) 2020     Influenza, Quad, High Dose, Pf, 65yr + 2020     Tdap (Adacel,Boostrix) 2020       Patient Active Problem List   Diagnosis     Paroxysmal atrial fibrillation (H)     Type 2 diabetes mellitus with complication, with long-term current use of insulin (H)     Stage 3b chronic kidney disease     H/O gastric bypass     CECILIA (obstructive sleep apnea)     Vitamin B12 deficiency (non anemic)     Paroxysmal VT (H)     ICD (implantable cardioverter-defibrillator), Medtronic Biventricular ICD - Not Dependent     NICM (nonischemic cardiomyopathy) (H)/ EF 20%     Heart failure (H)     LVAD (left ventricular assist device) present (H)     Long-term (current) use of anticoagulants [Z79.01]     Physical deconditioning     Chronic systolic congestive heart failure (H)     Iron deficiency anemia     Influenza     Dyspnea     Acute-on-chronic kidney injury (H)     Shortness of breath     WALTER (acute kidney injury) (H)     Bacteremia     Class 2 severe obesity due to excess calories with serious comorbidity and body mass index (BMI) of 35.0 to 35.9 in adult (H)     Bilateral carpal tunnel  syndrome     Rotator cuff tear arthropathy of both shoulders     COPD (chronic obstructive pulmonary disease) (H)     Major depression, recurrent, chronic (H)     Gouty arthropathy, chronic, without tophi     Right carpal tunnel syndrome     Decompensated heart failure (H)     Transplanted heart (H)     S/P orthotopic heart transplant (H)            Current Medications & Allergies:       acetaminophen  975 mg Oral Q8H     allopurinol  300 mg Oral Daily     amitriptyline  37.5 mg Oral At Bedtime     aspirin  81 mg Oral Daily     [Held by provider] bumetanide  2 mg Oral BID     buPROPion  100 mg Oral BID     calcium citrate-vitamin D  1 tablet Oral BID     cyanocobalamin  1,000 mcg Intramuscular Q30 Days     dapsone  100 mg Oral Daily     fluticasone-vilanterol  1 puff Inhalation Daily     gabapentin  300 mg Oral Daily     gabapentin  600 mg Oral BID     heparin ANTICOAGULANT  5,000 Units Subcutaneous Q8H     heparin lock flush  5-10 mL Intracatheter Q24H     insulin aspart  1-10 Units Subcutaneous TID AC     insulin aspart  1-7 Units Subcutaneous At Bedtime     insulin aspart   Subcutaneous TID w/meals     [Held by provider] insulin glargine  30 Units Subcutaneous QAM AC     ipratropium - albuterol 0.5 mg/2.5 mg/3 mL  3 mL Nebulization Q4H While awake     lidocaine  2 patch Transdermal Q24H     lidocaine   Transdermal Q8H     methylPREDNISolone  12 mg Intravenous QPM     methylPREDNISolone  16 mg Intravenous QAM     montelukast  10 mg Oral At Bedtime     mycophenolate mofetil  1,500 mg Intravenous Q12H ELI     nystatin  1,000,000 Units Swish & Swallow 4x Daily     pantoprazole  40 mg Oral BID AC     piperacillin-tazobactam  3.375 g Intravenous Q6H     polyethylene glycol  17 g Oral BID     rosuvastatin  10 mg Oral Daily     scopolamine  1 patch Transdermal Q72H    And     scopolamine   Transdermal Q8H     senna-docusate  1 tablet Oral BID     sodium chloride (PF)  3 mL Intracatheter Q8H     umeclidinium  1 puff  Inhalation Daily     valGANciclovir  450 mg Oral Daily       Infusions/Drips:      dextrose       - MEDICATION INSTRUCTIONS -       ACE/ARB/ARNI NOT PRESCRIBED       tacrolimus (Prograf) infusion ADULT 100 mcg/hr (05/25/21 0104)       Allergies   Allergen Reactions     Beer Shortness Of Breath     Grass Shortness Of Breath     Ace Inhibitors Cough     Dust Mites Other (See Comments)     Asthma     Mold Other (See Comments)     Asthma     Penicillins Other (See Comments)     Unknown - childhood exposure    Tolerated Zosyn 9/18-9/20/2020     Sulfa Drugs Other (See Comments) and Unknown     Unknown childhood reaction            Physical Exam:     Patient Vitals for the past 24 hrs:   BP Temp Temp src Pulse Resp SpO2 Weight   05/25/21 0809 107/66 -- -- 121 20 94 % --   05/25/21 0750 114/71 98.9  F (37.2  C) Oral 116 22 95 % --   05/25/21 0506 -- -- -- -- 20 94 % --   05/25/21 0500 -- -- -- -- -- (!) 88 % --   05/25/21 0428 -- -- -- -- -- -- 103.1 kg (227 lb 6.4 oz)   05/25/21 0330 -- -- -- -- -- 93 % --   05/25/21 0325 114/79 98.3  F (36.8  C) Oral 110 18 91 % --   05/25/21 0003 126/77 98.8  F (37.1  C) Oral 108 18 93 % --   05/24/21 2012 -- -- -- 106 -- -- --   05/24/21 1911 120/64 98.4  F (36.9  C) Oral 105 18 93 % --   05/24/21 1521 131/83 98.8  F (37.1  C) Oral 103 18 94 % --   05/24/21 1200 110/70 98.3  F (36.8  C) Oral 100 18 93 % --     Ranges for vital signs:  Temp:  [98.3  F (36.8  C)-98.9  F (37.2  C)] 98.9  F (37.2  C)  Pulse:  [100-121] 121  Resp:  [18-22] 20  BP: (107-131)/(64-83) 107/66  SpO2:  [88 %-95 %] 94 %  Vitals:    05/23/21 0406 05/24/21 0307 05/25/21 0428   Weight: 106.4 kg (234 lb 9.6 oz) 105.8 kg (233 lb 4.8 oz) 103.1 kg (227 lb 6.4 oz)       Physical Examination:  GENERAL:  well-developed, well-nourished, in bed in some acute distress.  HEAD:  Head is normocephalic, atraumatic   EYES:  Eyes have anicteric sclerae without conjunctival injection   NECK:  Supple. No cervical  lymphadenopathy  LUNGS:  Clear to auscultation bilateral.   CARDIOVASCULAR:  Tachycardic but normal rhythm with no murmurs, gallops or rubs.  ABDOMEN:  Bowel sounds but tender with some distension  SKIN:  Mild lower extremity edema  NEUROLOGIC:  Grossly nonfocal. Active x4 extremities         Laboratory Data:     No results found for: ACD4    Inflammatory Markers    Recent Labs   Lab Test 05/24/21  0544 05/20/21  0803 03/07/21  0720 03/07/21  0720 10/27/20  1250 10/27/20  1250 10/20/20  1305 10/13/20  1320 09/10/20  0830 09/10/20  0830   SED  --   --   --   --   --  10 8 10   < >  --    CRP 4.0 6.9   < >  --   --  3.5 11.0* 12.0*   < >  --    RAFFY  --   --   --  7.3   < >  --   --   --   --   --    PSA  --   --   --   --   --   --   --   --   --  0.35    < > = values in this interval not displayed.       Immune Globulin Studies     Recent Labs   Lab Test 12/21/20  1133      IGM 55          Metabolic Studies       Recent Labs   Lab Test 05/25/21  0453 05/24/21  1724 05/24/21  0544 05/08/21  0848 05/08/21  0848 05/08/21  0340 05/06/21  1535 05/06/21  1535 05/04/21  2313 05/04/21  2313    138 137   < >  --  137   < > 136   < > 135   POTASSIUM 3.9 3.8 3.9   < >  --  4.2   < > 5.2   < > 4.0   CHLORIDE 104 106 103   < >  --  105   < > 103   < > 101   CO2 26 28 28   < >  --  21   < > 25   < > 26   ANIONGAP 8 4 6   < >  --  11   < > 9   < > 7   BUN 48* 52* 59*   < >  --  82*   < > 58*   < > 50*   CR 1.81* 1.86* 1.97*   < >  --  2.53*   < > 2.22*   < > 1.60*   GFRESTIMATED 39* 37* 35*   < >  --  26*   < > 30*   < > 45*   * 74 86   < >  --  145*   < > 201*   < > 115*   A1C  --   --   --   --   --   --   --   --   --  5.1   QAMAR 8.2* 7.9* 8.2*   < >  --  7.7*   < > 8.5   < > 9.1   PHOS  --   --   --   --   --   --   --  5.7*   < > 3.8   MAG 2.0 1.9 2.0   < >  --   --    < > 3.0*   < > 2.3   LACT  --   --   --   --  1.0 1.2   < >  --    < >  --    PCAL  --   --  0.08   < >  --   --   --   --   --   --      < > = values in this interval not displayed.       Hepatic Studies    Recent Labs   Lab Test 05/24/21  0544 05/20/21  0803 05/10/21  0330 05/10/21  0330 04/30/21  0514 04/30/21  0514   BILITOTAL 0.8 0.5   < > 0.6   < >  --    DBIL 0.3* 0.2   < > 0.3*   < >  --    ALKPHOS 147 102   < > 78   < >  --    PROTTOTAL 5.5* 5.3*   < > 5.0*   < >  --    ALBUMIN 2.8* 2.8*   < > 2.8*   < >  --    AST 28 28   < > 34   < >  --    ALT 58 46   < > 48   < >  --    LDH  --   --   --  386*  --  317*    < > = values in this interval not displayed.       Pancreatitis testing    Recent Labs   Lab Test 05/04/21  2313 11/05/20  0907 08/05/20  0335   AMYLASE 93  --   --    LIPASE  --   --  168   TRIG  --  91  --        Gout Labs      Recent Labs   Lab Test 03/12/21  0605 02/01/21  1127 01/09/21  0538 12/21/20  1133 11/30/20  0808   URIC 4.8 3.9 5.0 4.5 3.9       Hematology Studies      Recent Labs   Lab Test 05/25/21  0453 05/24/21  0544 05/23/21  0542 05/22/21  0605 05/22/21  0605 05/21/21  0441 05/20/21  0803 05/17/21  0530 05/17/21  0530 05/15/21  0533 05/15/21  0533   WBC 17.2* 14.8* 12.4*  --  13.9* 12.9* 15.7*   < > 18.7*   < > 17.6*   ANEU  --   --   --   --   --   --   --   --  17.2*  --  15.7*   ALYM  --   --   --   --   --   --   --   --  0.6*  --  0.6*   VIJAY  --   --   --   --   --   --   --   --  0.6  --  0.9   AEOS  --   --   --   --   --   --   --   --  0.0  --  0.0   HGB 8.2* 8.2* 7.4*   < > 7.2* 8.0* 8.4*   < > 7.8*   < > 8.2*   HCT 27.5* 26.8* 23.7*  --  23.6* 25.7* 26.5*   < > 24.5*   < > 26.0*    220 240  --  252 275 305   < > 292   < > 284    < > = values in this interval not displayed.       Clotting Studies    Recent Labs   Lab Test 05/12/21  0622 05/10/21  0330 05/07/21  0340 05/06/21  0650   INR 1.19* 1.33* 1.38* 1.45*   PTT  --   --   --  35       Iron Testing    Recent Labs   Lab Test 05/25/21  0453 05/13/21  0534 05/13/21  0534 02/09/21  0518 02/09/21  0518 01/21/21  1350 01/21/21  1350   IRON  --   --   38  --  28*  --  36   FEB  --   --  215*  --  285  --  381   IRONSAT  --   --  18  --  10*  --  10*   VITA  --   --  98  --  33  --   --    *   < > 94   < >  --    < >  --    FOLIC  --   --  14.4  --   --   --   --    B12  --   --  734  --   --    < >  --     < > = values in this interval not displayed.       Arterial Blood Gas Testing    Recent Labs   Lab Test 05/09/21  0956 05/09/21  0902 05/09/21  0344 05/08/21 2004 05/07/21  1900 05/07/21  1900 05/07/21  1700 05/07/21 1700 05/07/21  1420 05/07/21 0340 05/07/21 0340 05/06/21 2152   PH  --   --   --   --   --  7.40  --  7.38 7.39  --  7.38 7.37   PCO2  --   --   --   --   --  38  --  41 40  --  41 44   PO2  --   --   --   --   --  101  --  102 130*  --  153* 148*   HCO3  --   --   --   --   --  23  --  24 24  --  24 25   O2PER 21 21 REPORT AMENDED: 21.0 REPORT AMENDED:   < > 2LNC   < > Canceled, Test credited  2L 40   < > 40.0  40.0 40  40    < > = values in this interval not displayed.        Thyroid Studies     Recent Labs   Lab Test 02/01/21  1127 12/18/20  1018 06/24/20  0747 07/31/19  1050 07/03/19  0828   TSH 3.52 3.45 1.30 1.23 0.82       Urine Studies     Recent Labs   Lab Test 05/16/21  0528 05/15/21  1730 05/07/21  0804 05/05/21  0515 03/08/21  1525 09/10/20  0921   URINEPH 5.5 Unsatisfactory specimen - collected in wrong container 5.0 6.0 6.0 6.5   NITRITE Negative Unsatisfactory specimen - collected in wrong container Negative Negative Negative Negative   LEUKEST Negative Unsatisfactory specimen - collected in wrong container Negative Negative Negative Negative   WBCU 1  --  8* 0 0 0 - 5       Medication levels    Recent Labs   Lab Test 05/23/21  0542 05/08/21 2004 05/08/21 2004   VANCOMYCIN  --   --  18.5   TACROL 6.3   < >  --     < > = values in this interval not displayed.       Body fluid stats    Recent Labs   Lab Test 01/21/20  0424   GS <10 Squamous epithelial cells/low power field  >25 PMNs/low power field  Many  Gram  negative coccobacilli  *  Few  Mixed gram positive jaxon         Microbiology:    Last Culture results with specimen source  Culture Micro   Date Value Ref Range Status   05/17/2021 No growth  Final   05/17/2021 No growth  Final   05/15/2021 No growth  Final   11/04/2020 No growth  Final   09/23/2020 No growth  Final   09/22/2020 No growth  Final   09/21/2020 No growth  Final   09/20/2020 No growth  Final   09/19/2020 No growth  Final   09/18/2020 (A)  Final    Cultured on the 2nd day of incubation:  Staphylococcus hominis  Susceptibility testing done on previous specimen     09/18/2020   Final    Critical Value/Significant Value, preliminary result only, called to and read back by  Tamy Leventhal RN 1724 9/20/20 AM     09/18/2020 No growth  Final   09/17/2020 (A)  Final    Cultured on the 1st day of incubation:  Staphylococcus hominis     09/17/2020   Final    Critical Value/Significant Value, preliminary result only, called to and read back by  John Howard RN @1414 09/18/2020 hd/     09/17/2020   Final    (Note)  POSITIVE for Staphylococci other than S.aureus, S.epidermidis and  S.lugdunensis, by Verigene multiplex nucleic acid test.  Coagulase-negative staphylococci are the most common venipuncture or  collection associated skin CONTAMINANTS grown in blood cultures.  Final identification and antimicrobial susceptibility testing will be  verified by standard methods.    Specimen tested with Verigene multiplex, gram-positive blood culture  nucleic acid test for the following targets: Staph aureus, Staph  epidermidis, Staph lugdunensis, other Staph species, Enterococcus  faecalis, Enterococcus faecium, Streptococcus species, S. agalactiae,  S. anginosus grp., S. pneumoniae, S. pyogenes, Listeria sp., mecA  (methicillin resistance) and Lucía/B (vancomycin resistance).    Critical Value/Significant Value called to and read back by Le Carmona RN. @1712. 9.18.20. BS.      09/17/2020 No growth  Final    01/21/2020 No growth  Final   01/21/2020 (A)  Final    Heavy growth  Haemophilus influenzae  Beta lactamase negative  Beta-lactamase negative Haemophilus influenzae are usually susceptible to ampicillin,   amoxacillin/clavulanic acid, levofloxacin, and 3rd generation cephalosporins, such as   ceftriaxone.     01/20/2020 No growth  Final    Specimen Description   Date Value Ref Range Status   05/17/2021 Blood  Final   05/17/2021 Blood  Final   05/15/2021 Blood PURPLE PORT  Final   05/06/2021 Nares  Final   11/04/2020 Blood Unspecified Site  Final   09/23/2020 Blood Left Arm  Final   09/22/2020 Blood Left Arm  Final   09/21/2020 Blood Left Arm  Final   09/20/2020 Blood Left Arm  Final   09/19/2020 Blood Left Arm  Final   09/18/2020 Blood Right Arm  Final   09/18/2020 Blood Right Arm  Final   09/17/2020 Blood Right Arm  Final   09/17/2020 Blood Right Arm  Final   01/21/2020 Catheterized Urine  Final   01/21/2020 Sputum  Final   01/21/2020 Sputum  Final   01/20/2020 Blood Unspecified Site  Final   01/20/2020 Blood Unspecified Site  Final        Syphilis Testing    Treponema Antibodies   Date Value Ref Range Status   09/10/2020 Nonreactive NR^Nonreactive Final     Comment:     Methodology Change: Test performed on the Aniika Liaison XL by Treponema   pallidum Total Antibodies Assay as of 3.17.2020.         Quantiferon testing   Recent Labs   Lab Test 05/17/21  0530 05/15/21  0533 09/10/20  0830 09/10/20  0830   TBRST  --   --   --  Negative   LYMPH 3.3 3.4   < >  --     < > = values in this interval not displayed.       Virology:  Coronavirus-19 testing    Recent Labs   Lab Test 05/20/21  0640 05/12/21  1000 05/04/21  2357 04/23/21  2055 01/04/21  0947 01/04/21  0947 09/17/20  1600 08/05/20  0938 08/01/20  1126   [id number] Nasopharyngeal Nasopharyngeal Nasopharyngeal Nasopharyngeal   < > Nasopharyngeal Nasopharyngeal Nasopharyngeal  --    SARSCOVRES NEGATIVE NEGATIVE NEGATIVE NEGATIVE   < > NEGATIVE NEGATIVE  NEGATIVE  --    [id number]  --   --   --   --   --  Nasopharyngeal Nasopharyngeal Nasopharyngeal Nasopharyngeal   [id number]  --   --   --   --   --  Test received-See reflex to IDDL test SARS CoV2 (COVID-19) Virus RT-PCR Test received-See reflex to IDDL test SARS CoV2 (COVID-19) Virus RT-PCR Test received-See reflex to IDDL test SARS CoV2 (COVID-19) Virus RT-PCR Not Detected    < > = values in this interval not displayed.       Respiratory virus (non-coronavirus-19) testing    Recent Labs   Lab Test 03/05/21  1655 02/28/21  1020 02/12/21  0915 01/15/20  2210   AFLU  --   --   --  Negative   IFLUA Not Detected Not Detected Not Detected Not Detected   INFZA  --  Negative  --   --    FLUAH1 Not Detected Not Detected Not Detected Not Detected   [id number] Not Detected Not Detected Not Detected Not Detected   FLUAH3 Not Detected Not Detected Not Detected Not Detected   BFLU  --   --   --  Positive*   IFLUB Not Detected Not Detected Not Detected Detected, Abnormal Result*   INFZB  --  Negative  --   --    PIV1 Not Detected Not Detected Not Detected Not Detected   PIV2 Not Detected Not Detected Not Detected Not Detected   PIV3 Not Detected Not Detected Not Detected Not Detected   PIV4 Not Detected Not Detected Not Detected Not Detected   RSVA Not Detected Not Detected Not Detected Not Detected   RSVB Not Detected Not Detected Not Detected Not Detected   HMPV Not Detected Not Detected Not Detected Not Detected   ADENOV Not Detected Not Detected Not Detected Not Detected   CORONA Not Detected Not Detected Not Detected Not Detected     Hepatitis B Testing     Recent Labs   Lab Test 05/04/21  2313 04/13/21  0900 09/10/20  0830   AUSAB 0.00  --  0.00   HBCAB Nonreactive  --  Nonreactive   HEPBANG Nonreactive Nonreactive Nonreactive        Hepatitis C Antibody   Date Value Ref Range Status   05/04/2021 Nonreactive NR^Nonreactive Final     Comment:     Assay performance characteristics have not been established for newborns,    infants, and children     09/10/2020 Nonreactive NR^Nonreactive Final     Comment:     Assay performance characteristics have not been established for newborns,   infants, and children         CMV Antibody IgG   Date Value Ref Range Status   05/04/2021 >8.0 (H) 0.0 - 0.8 AI Final     Comment:     Positive  Antibody index (AI) values reflect qualitative changes in antibody   concentration that cannot be directly associated with clinical condition or   disease state.     09/10/2020 >8.0 (H) 0.0 - 0.8 AI Final     Comment:     Positive  Antibody index (AI) values reflect qualitative changes in antibody   concentration that cannot be directly associated with clinical condition or   disease state.       Varicella Zoster Virus Antibody IgG   Date Value Ref Range Status   09/10/2020 1.1 (H) 0.0 - 0.8 AI Final     Comment:     Positive, suggests prev. exposure and probable immunity  Antibody index (AI) values reflect qualitative changes in antibody   concentration that cannot be directly associated with clinical condition or   disease state.       EBV Capsid Antibody IgG   Date Value Ref Range Status   05/04/2021 5.4 (H) 0.0 - 0.8 AI Final     Comment:     Positive, suggests recent or past exposure  Antibody index (AI) values reflect qualitative changes in antibody   concentration that cannot be directly associated with clinical condition or   disease state.     09/10/2020 3.8 (H) 0.0 - 0.8 AI Final     Comment:     Positive, suggests recent or past exposure  Antibody index (AI) values reflect qualitative changes in antibody   concentration that cannot be directly associated with clinical condition or   disease state.       Toxoplasma Antibody IgG   Date Value Ref Range Status   09/10/2020 <3.0 0.0 - 7.1 IU/mL Final     Comment:     Negative- Absence of detectable Toxoplasma gondii IgG antibodies. A negative   result does not rule out acute infection.  The magnitude of the measured result is not indicative of the amount  of   antibody present. The concentrations of anti-Toxoplasma gondii IgG in a given   specimen determined with assays from different manufacturers can vary due to   differences in assay methods and reagent specificity.       Herpes Simplex Virus Type 1 IgG   Date Value Ref Range Status   09/10/2020 >8.0 (H) 0.0 - 0.8 AI Final     Comment:     Positive.  IgG antibody to HSV-1 detected.  Antibody index (AI) values reflect qualitative changes in antibody   concentration that cannot be directly associated with clinical condition or   disease state.       Herpes Simplex Virus Type 2 IgG   Date Value Ref Range Status   09/10/2020 <0.2 0.0 - 0.8 AI Final     Comment:     No HSV-2 IgG antibodies detected.  Antibody index (AI) values reflect qualitative changes in antibody   concentration that cannot be directly associated with clinical condition or   disease state.         Imaging:  Recent Results (from the past 48 hour(s))   XR Chest 2 Views    Narrative    EXAM: XR CHEST 2 VW  5/23/2021 1:16 PM     HISTORY:  chest tube in place       COMPARISON:  Chest x-ray 5/22/2021    FINDINGS: PA and lateral radiographs of the chest. Stable positioning  of right midlung chest tube. Right arm PICC with tip obscured by the  metallic retained pacer wire however at least in the upper SVC.     Stable cardiac and mediastinal silhouette. Asymmetric elevation of the  left hemidiaphragm. Small left pleural effusion. No appreciable  pneumothorax. Unchanged dense left retrocardiac opacity and streaky  bibasilar and perihilar opacities. Significant subcutaneous emphysema  about the left chest wall. Visualized upper abdomen is unremarkable.  No acute osseous abnormality. Unchanged discontinuity of the superior  most median sternotomy wire.      Impression    IMPRESSION:  1. Stable right midlung chest tube. No appreciable pneumothorax.  2. Asymmetric elevation of the left hemidiaphragm with small left  pleural effusion.  3. Unchanged dense left  retrocardiac opacity likely atelectasis versus  consolidation.    I have personally reviewed the examination and initial interpretation  and I agree with the findings.    JOANNE MCMAHAN MD   XR Chest 2 Views    Narrative    EXAM: XR CHEST 2 VW  5/23/2021 4:13 PM     HISTORY:  chest tube removal       COMPARISON:  Chest x-ray 5/23/2020    FINDINGS: PA and lateral radiographs of the chest. The right-sided  chest tube is been removed. Right arm PICC with tip is again obscured  by the overlying retained pacer wire however at least at the level of  the mid SVC.     Cardiomediastinal silhouette is within normal limits. Asymmetric  elevation left hemidiaphragm. Small left pleural effusion. No  appreciable pneumothorax. Streaky perihilar and dense left cardiac  opacity are unchanged. Subcutaneous emphysema about the right and left  chest wall. Visualized upper abdomen is unremarkable. Bones are  stable.      Impression    IMPRESSION:  1. No appreciable pneumothorax following chest tube removal.  2. Unchanged bibasilar and retrocardiac opacities, likely atelectasis.    I have personally reviewed the examination and initial interpretation  and I agree with the findings.    JOANNE MCMAHAN MD   XR Abdomen Port 1 View    Narrative    Exam: XR ABDOMEN PORT 1 VIEWS, 5/24/2021 10:11 AM    Indication: abd pain with emesis    Comparison: Abdomen 5/17/2019. CTA chest abdomen pelvis 5/18/2021.    Findings:   Portable AP supine abdominal radiographs. Cardiac monitoring device  outside of the patient, overlying the right lower quadrant.  Postsurgical changes in the epigastrium demonstrated with anastomotic  suture line consistent with Sylvester-en-Y gastric bypass on CT. No  air-filled distended loops of bowel identified. Overall paucity of  gas. There are some scattered stool in the colon which has decreased  significantly in volume from prior radiograph. Evaluation for free air  limited on supine imaging.    Subcutaneous emphysema in the  thorax, left greater than right  partially visualized. Sternotomy wires and surgical clips in the  thorax present. Elevation of the left hemidiaphragm with small  effusion in the basilar atelectasis, left greater than right noted.  Please see recent chest radiographs.    Osseous structures stable.      Impression    Impression: Volume of stool has decreased significantly from prior  radiograph. No gas-filled distended loops of bowel identified.  Postsurgical changes from Sylvester-en-Y bypass.    JEROME CUMMINGS MD   XR Chest 2 Views    Narrative    EXAM: XR CHEST 2 VW  5/24/2021 2:41 PM     HISTORY:  recheck for pneumo after R pleural removal       COMPARISON:  Chest x-ray 5/23/2021, 5/22/2021.    FINDINGS:    AP and lateral upright chest x-ray. Postsurgical changes of the chest.  Median sternotomy wires are intact with stable fracturing of the most  superior median sternotomy wire. Retained left internal jugular pacer  lead. Right upper cavity PICC tip is obscured but likely projects over  the upper to mid SVC.    Trachea is midline. Cardiac silhouette is obscured. Diffuse bilateral  mixed interstitial and airspace opacities, increased from prior exam.  Small bilateral pleural effusions, increased from prior exam. No  pneumothorax. Aortic arch atherosclerotic calcifications.    No acute osseous abnormality. Decreased subcutaneous gas overlying the  left chest wall.  Minimal residual subcutaneous gas along lateral  right chest wall.      Impression    IMPRESSION:  1. Increased, diffuse bilateral mixed interstitial and airspace  opacities suggestive for pulmonary edema versus atelectasis.  2. Small bilateral pleural effusions, increased.  3. No pneumothorax.    I have personally reviewed the examination and initial interpretation  and I agree with the findings.    ELZBIETA GARNER MD

## 2021-05-25 NOTE — PROGRESS NOTES
[facility]   Cardiology II Service / Advanced Heart Failure  Daily Progress Note  Date of Service: 5/25/2021      Patient: Jim Willingham  MRN: [id number]  Admission Date: 2/8/2021  Hospital Day # 106       Assessment and Plan: Jim Willingham is a 65 year old male with history of NICM EF 20% c/b VT s/p CRT-D s/p HMII LVAD 6/19/2017 originally intended as destination therapy 2/2 obesity however with recent weight loss now candidate for transplantation. He is admitted for decompensated heart failure with subsequent s/p OHT 5/6/21.    Recommendations today:   - Bumex discontinued in setting of poor oral intake.   - Immunosuppression transitioned to IV given bowel issues.   - Tac gtt increased to 110 mcg/hr level-6.4.  - Decrease Cellcept in setting of infection to 1000 mg po BID.   - Agree with infectious workup and would broaden for aspiration coverage to Zosyn.   - Appreciate Transplant ID consult.   - Abdominal imaging per CVTS.      S/p OHT. 5/6/21 secondary to NICM. Echo 5/14/21 conistent with EF 65-70%, mildly decreased RV function, and dilated IVC. RHC 5/20/21 with mRA-11, RV-35/11, mPA-23, mPCWP-11, MICHELLE CO-6.2, and MICHELLE CI-2.7. DSA negative 5/13/21.    Immunosuppression: Simulect induction 5/6 and 5/10. Tac gtt increased to 110 mcg/hr level-6.4, goal 10-12. IV Cellcept 1000 mg po BID. Solumedrol 16 mg in AM and 12 mg in the evening.     Serostatus: CMV: D+/R+, EBV: D+/R+, Toxo D-/R-  Prophylaxis: Dapsone, Nystatin, and Valcyte.   - Continue Crestor and ASA, not tolerating po at this time.   - Repeat RHC/biopsy 5/27.  - Bumex on hold in setting of inability to tolerate oral intake. Monitor I & O.      Worsening Leukocytosis. Chest X-ray 5/12/21 consistent with small right apical pneumo, small left pleural effusion, and bilateral opacities. CT Chest positive for left lung base consolidation vs atelectasis. Chest X-ray 5/21/21 consistent with left retrocardiac opacity, atelectasis vs  "consolidation. Levaquin initiated with concern for HCAP per CT.   - WBC- 17.2 (14.8).  - Blood cx and sputum cx pending. UA negative. Chest X-ray consistent with bibasilar mixed interstitial and airspace opacities, likely atelectasis versus pulmonary edema.   - Broaden to Zosyn. Appreciate Transplant ID consult.     Nausea with Emesis. Dysphagia with Dyspepsia. History of Gastric bypass surgery. Abd X-ray with improved stool burden 5/24. Large BM with persistent nausea. Repeat Abdominal X-ray this AM with stool burden.   - Consider bariatric surgery consult.   - NG placement via fluoroscopy for gastrografin study today per CVTS.   - Aggressive bowel regimen per CVTS.      Right pleural tube air leak, resolved.   - Management per CVTS.   ================================================================    Interval History/ROS: He notes large BM this AM with persistent nausea and vomiting this AM. He notes persistent cough following emesis 2 nights ago. He denies fever, chills, chest pain, palpitations, diarrhea. He complains of LE edema, improved today. He is not tolerating oral intake today. He is up at the edge of bed at this time.     Last 24 hr care team notes reviewed.   ROS:  4 point ROS including Respiratory, CV, GI and , other than that noted in the HPI, is negative.     Medications: Reviewed in EPIC.     Physical Exam:   /70 (BP Location: Left arm)   Pulse 119   Temp 98.1  F (36.7  C) (Oral)   Resp 20   Ht 1.727 m (5' 8\")   Wt 103.1 kg (227 lb 6.4 oz)   SpO2 97%   BMI 34.58 kg/m    GENERAL: Appears alert and oriented times three.   HEENT: Eye symmetrical and free of discharge bilaterally. Mucous membranes moist and without lesions.  NECK: Supple and without lymphadenopathy. JVD 8 cm   CV: Regular tachycardic. S1S2 present without murmur, rub, or gallop.   RESPIRATORY: Respirations regular, even, and unlabored. Lungs CTA throughout.   GI: Soft and mildly distended with normoactive bowel sounds " present in all quadrants. No rebound or guarding. Tenderness throughout. No organomegaly.   EXTREMITIES: +1 bilateral LE peripheral edema. 2+ bilateral pedal pulses.   NEUROLOGIC: Alert and orientated x 3. CN II-XII grossly intact. No focal deficits.   MUSCULOSKELETAL: No joint swelling or tenderness.   SKIN: No jaundice. No rashes or lesions.     Data:  CMP  Recent Labs   Lab 05/25/21  0453 05/24/21  1724 05/24/21  0544 05/23/21  1734 05/20/21  0803 05/20/21  0803    138 137 131*   < > 130*   POTASSIUM 3.9 3.8 3.9 4.9   < > 4.2   CHLORIDE 104 106 103 100   < > 98   CO2 26 28 28 26   < > 29   ANIONGAP 8 4 6 5   < > 4   * 74 86 240*   < > 114*   BUN 48* 52* 59* 58*   < > 64*   CR 1.81* 1.86* 1.97* 1.98*   < > 2.03*   GFRESTIMATED 39* 37* 35* 35*   < > 34*   GFRESTBLACK 45* 43* 40* 40*   < > 39*   QAMAR 8.2* 7.9* 8.2* 7.8*   < > 7.9*   MAG 2.0 1.9 2.0 1.8   < > 2.1   PROTTOTAL  --   --  5.5*  --   --  5.3*   ALBUMIN  --   --  2.8*  --   --  2.8*   BILITOTAL  --   --  0.8  --   --  0.5   ALKPHOS  --   --  147  --   --  102   AST  --   --  28  --   --  28   ALT  --   --  58  --   --  46    < > = values in this interval not displayed.     CBC  Recent Labs   Lab 05/25/21  0453 05/24/21  0544 05/23/21  0542 05/22/21  1206 05/22/21  0605   WBC 17.2* 14.8* 12.4*  --  13.9*   RBC 2.73* 2.74* 2.41*  --  2.41*   HGB 8.2* 8.2* 7.4* 7.4* 7.2*   HCT 27.5* 26.8* 23.7*  --  23.6*   * 98 98  --  98   MCH 30.0 29.9 30.7  --  29.9   MCHC 29.8* 30.6* 31.2*  --  30.5*   RDW 18.0* 17.6* 17.1*  --  17.0*    220 240  --  252     Patient seen and discussed with Dr. Montgomery.     Soraya Marshall [facility]  5/25/2021

## 2021-05-25 NOTE — PLAN OF CARE
PT: Cx, pt having nausea and SOB, unable to comfortably rest and would not tolerate therapies today per RN.

## 2021-05-25 NOTE — PROGRESS NOTES
Cardiovascular Surgery Progress Note  05/25/2021         Assessment and Plan:     Mr. Jim Willingham is a 65 year old male with history of NICM EF 20% c/b VT s/p CRT-D s/p HMII LVAD 6/19/2017 originally intended as destination therapy 2/2 obesity however with recent weight loss now candidate for transplantation. He was admitted 2/8/21 for worsening functional status and renal function concerning for worsening heart failure. He was eventually listed status 2E for transplant and received a donor offer.   Jim is now s/p orthotopic heart transplant on 5/6/21 with Dr. Ronnie Quigley. He was shocked > 2x received amio, mag and lidocaine while coming off bypass. Known lung injury with isolated air leak to right pleural that eventually resolved, CT removed 5/23.      Cardiovascular:   Hx of NICM EF 20%, VT (ICD)  Hx HeartMate II LVAD 6/19/2017  Cardiorenal syndrome   S/P orthotopic heart transplant   No arrhythmia, HD stable.  Most recent echo showed LV EF 65-70% on 5/9/21.  Basiliximab 5/10.  Terbutaline weaned to off.    ASA 81 mg, Crestor 10 mg daily.   Diuresis with Bumex 2 mg PO BID, Cards II following. Diuresing as per Cards Cards. Lymphedema wraps 5/18, improving.   Chest tubes: Airleak was isolated to Right pleural. Had some CP after walking off suction 5/13 with associated subcutaneous emphysema, needed portable suction while ambulating and after improvement was able to ambulate OFF suction 5/19. He was kept to -10 mmHg while in room to reduce subcutaneous emphysema from getting worse. Very slow intermittent air leak 5/19, this resolved by 5/22. R pleural removed 5/23 without complication. Stable left hemidiaphragm elevation.   TPW: removed.       Pulmonary:  Hx COPD and CECILIA on CPAP  Left hemidiaphragm elevation   Air leak from Right pleural tube (unintended injury during surgery)   Possible HAP - persistent opacity in Left retrocardiac base  - PTA Breo ellipta, Incruse ellipta, and Singulair   - Extubated  POD 1 to 4 lpm via NC. Now saturating well on RA.   - Pulm toilet, IS, activity and deep breathing  - R pleural fluid Triglycerides normal on 5/17 (first day of increased drainage)  - Levaquin 750 mg every day for five days (since 5/22) switched to IV Zosyn 5/25 am due to leukocytosis.      Neurology / MSK:  Hx Depression  - PTA bupropion and amitriptyline  Post-op Status  - Neuro intact  - Acute post-operative pain well controlled with acetaminophen, PO dilaudid PRN, IV dilaudid PRN  R Hip Pain   - Had R hip pain increased over 2 days, was keeping him from raising legs into bed independently. Pain with flexion and abduction. Hip X-ray 5/16 without acute concerns, tried ice and pain has resolved.        / Renal:  WALTER on CKD stage III   - Cardiorenal syndrome improving. Most recent creatinine 1.81.    - Day before surgery weight 102.3 kg.      GI / FEN:   Hx Sylvester-en-Y gastric bypass  Non-severe Mild Protein-Calorie Malnutrition   - Regular diet with supplements, continue bowel regimen.   - Replace electrolytes as needed, hepatic enzymes WNL.  Nausea with Emesis   - +BM 5/18 with lactulose, dosed again 5/24 per patient request. Suppository 5/25 AM with + BM but still feeling nauseated after BM. No clear obstructive signs on Abd Xray.  - Place NG tube 5/25 with low intermittent suction. Gastrograffin challenge 5/25.      Endocrine:  DMT2, was on Insulin PTA  Gout   Stress induced hyperglycemia initially managed on insulin drip postop, transitioned to NPH BID and sliding scale. Endocrine consulted, signed off 5/9.  - Converted to Lantus 5/14 am, continue prandial and high resistance sliding corrective scale. Lantus on HOLD with reduced intake, he is nauseated and less intake. He had been using 30 U daily.   - PTA allopurinol.     Infectious Disease:  Stress induced leukocytosis  HAP, persistent left lower base opacity   - WBC 17.2, remains afebrile, no overt signs or symptoms of infection but suspicion remains for  "aspiration pneumonia. UA clean 5/15. Procalcitonin and CRP both very low 5/24.   - CT Ch/ Abd/ Pelvis 5/18 done for persistent elevated WBC; small consolidative opacity in LL base with ground glass opacities in both lung bases, ? atypical infection with some degree of atelectasis. WBC has been decreasing since, saturating well, no cough, afebrile. CXR 5/22 confirming his persistent elevated Left sandor-diaphragm. CXR 5/25 with evolving LLL opacity suggesting possible aspiration.   - Levaquin switched to Zosyn 5/25 am due to rising WBC. Sputum and UA sent 5/25.      Hematology:   Acute blood loss anemia and thrombocytopenia  Hgb 8.2 stable, Plts WNL, no signs or symptoms of active bleeding     Anticoagulation:   - ASA only     Prophylaxis:   - Stress ulcer prophylaxis: Pantoprazole 40 mg BID for Hx RNY and nausea   - DVT prophylaxis: Subcutaneous heparin, SCD     Disposition:   - Transferred to  on 5/10   - Therapies currently recommending discharge to ARU       Discussed with CVTS Fellow as needed.  Staff surgeons have been informed of changes through both verbal and written communication.      Nash Amado PA-C  Cardiothoracic Surgery  Pager [phone]    7:07 AM   May 25, 2021        Interval History:     Overnight events- lactulose 5/25 but no BM, minimal flatus, did not take suppository last night.   Still nauseated, SOB and needed O2 last night, less cough.     States pain is well managed on current regimen. Slept well overnight.  Breathing well without complaints.   Working with therapies and ambulating in halls without assistance.   Denies chest pain, palpitations, dizziness, syncopal symptoms, fevers, chills, myalgias, or sternal popping/clicking.         Physical Exam:   Blood pressure 114/79, pulse 110, temperature 98.3  F (36.8  C), temperature source Oral, resp. rate 20, height 1.727 m (5' 8\"), weight 103.1 kg (227 lb 6.4 oz), SpO2 94 %.  Vitals:    05/23/21 0406 05/24/21 0307 05/25/21 0428   Weight: " 106.4 kg (234 lb 9.6 oz) 105.8 kg (233 lb 4.8 oz) 103.1 kg (227 lb 6.4 oz)      Weight; + 1 kg since surgery and trending down.   24 hr Fluid status; net loss 1.3 L.   MAPs: 80 - 93     Gen: A&Ox4, NAD  Neuro: no focal deficits   CV: sinus tachy, normal S1 S2, no murmurs, rubs or gallops.   Pulm: diminished overall, no wheezing or rhonchi, light-labored breathing on 2 lpm  Abd: soft and distended, normal BS, soft, diffuse tenderness   Ext: trace peripheral edema, 1+ pitting  Incision: clean, dry, intact, no erythema, sternum stable  Tubes/drain sites: dressing clean and dry         Data:    Imaging:  reviewed recent imaging, no acute concerns    Labs:  BMP  Recent Labs   Lab 05/25/21  0453 05/24/21  1724 05/24/21  0544 05/23/21  1734    138 137 131*   POTASSIUM 3.9 3.8 3.9 4.9   CHLORIDE 104 106 103 100   QAMAR 8.2* 7.9* 8.2* 7.8*   CO2 26 28 28 26   BUN 48* 52* 59* 58*   CR 1.81* 1.86* 1.97* 1.98*   * 74 86 240*     CBC  Recent Labs   Lab 05/25/21  0453 05/24/21  0544 05/23/21  0542 05/22/21  1206 05/22/21  0605   WBC 17.2* 14.8* 12.4*  --  13.9*   RBC 2.73* 2.74* 2.41*  --  2.41*   HGB 8.2* 8.2* 7.4* 7.4* 7.2*   HCT 27.5* 26.8* 23.7*  --  23.6*   * 98 98  --  98   MCH 30.0 29.9 30.7  --  29.9   MCHC 29.8* 30.6* 31.2*  --  30.5*   RDW 18.0* 17.6* 17.1*  --  17.0*    220 240  --  252     INR  No lab results found in last 7 days.   Hepatic Panel  Recent Labs   Lab 05/24/21  0544 05/20/21  0803   AST 28 28   ALT 58 46   ALKPHOS 147 102   BILITOTAL 0.8 0.5   ALBUMIN 2.8* 2.8*     GLUCOSE:   Recent Labs   Lab 05/25/21  0453 05/25/21  0450 05/25/21  0203 05/25/21  0127 05/24/21  2131 05/24/21  2035 05/24/21 2016 05/24/21  1724 05/24/21  0544 05/24/21  0544 05/23/21  1734 05/23/21  1734 05/23/21  0542 05/23/21  0542 05/22/21  1718 05/22/21  1718   *  --   --   --   --   --   --  74  --  86  --  240*  --  148*  --  142*   BGM  --  109* 132* 80 106* 147* 65*  --    < >  --    < >  --    < >   --    < >  --     < > = values in this interval not displayed.

## 2021-05-25 NOTE — PLAN OF CARE
Neuro: A/O x 4. Sleepy after meds. Restless.  Cardiac: ST with , VSS.   Respiratory: Declined Bipap most of night, and RA sats dropped to 88%. O2 2l NC applied and sats up to 94%. LS crackles in RLL, but more coarse this am. MD notified at 0613.   GI/: Intermittent nausea continues. Emesis x 1, Compazine and zofran given. Urine leeanne.   Diet/appetite: Poor PO,   Activity:  Up to bedside chair with assist of 1-2  Pain: Reports pain in ribs, medicated with IV dilaudid due to nausea.  Skin: Sternal inc intact. Old CT site dressing CDI  LDA's: PICC, PIV  Tacro gtt infusing, gets BID IV Cellcept. BG running low due to pt not eating. Gave 25ml D50 overnight x 1, last .    Plan: Continue with POC. Notify primary team with changes.

## 2021-05-25 NOTE — PLAN OF CARE
D/I/A:  Pt s/p heart txp 5/6/21.  Pt not feeling well from a GI standpoint.  Intermittent N/V, no appetite, hypoactive BS.  BG low upon initial assessment.  1 amp dextrose given.  Dilaudid and zofran prn with some relief.  Pt had not taken all of his 1800 meds, so meds were modified in the MAR and MD updated so meds could be changed to IV for the important meds- (i.e. transplant meds) for overnight.  Mg 1.9 and K 3.8 upon recheck-MD updated, no new orders.  PIV obtained for prograf to infuse through.  Old CT site leaking and dsg changed x1.  Pt very tired and appears to be worn out and not feeling well.  +void.   P:  Prograf and cellcept to be given once available from pharmacy.  Possible GI consult.  Repeat RHC/bx 5/27.  Update team with changes/concerns.

## 2021-05-25 NOTE — PLAN OF CARE
D: Pt who presents this admission with worsening functional status and renal function concerning for worsening heart failure, pt now s/p OHT on 5/6/21. Hx of NICM (EF 20%) c/b VT s/p CRT-D s/p HMII LVAD on 6/19/2017, was originally intended as DT 2/2 obesity however with recent weight loss pt was a candidate for transplantation.     I/A: Pt alert and oriented x4 but intermittently lethargic. Pt sinus tach with HRs 100-120s. On 2-4L O2 via nasal cannula/oxymask with sats >90%. Pt reports chest/incisional pain, PRN dilaudid given 1x. [place] gtt changed to 110 mcg/hr. Pt started on IV zosyn, med given as scheduled. Sputum and UA obtained and sent down to lab. Pt continues to feel nauseous, PRN compazine given 1x. NG tube also placed using fluoroscopy down in XR and continued on low intermittent suction. Per provider, gastrografin challenge ordered and started. BG checks this shift were 108 and 142, sliding scale insulin not given. Lantus and carb coverage insulin also held due to pt not eating. Suppository given this morning, pt with successful BM today.       P: RN to unclamp NG tube at 1600 and pt to go down for XR of abdomen at 2000 per gastrografin challenge orders. Continue to monitor and assess pt with every encounter. Notify [facility] with any changes or concerns.

## 2021-05-25 NOTE — PLAN OF CARE
6C OT: Cancel     Per RN, pt not feeling well 2/2 nausea and requested to defer. Will cancel and reschedule per POC.

## 2021-05-26 ENCOUNTER — APPOINTMENT (OUTPATIENT)
Dept: GENERAL RADIOLOGY | Facility: CLINIC | Age: 66
DRG: 001 | End: 2021-05-26
Attending: PHYSICIAN ASSISTANT
Payer: COMMERCIAL

## 2021-05-26 ENCOUNTER — APPOINTMENT (OUTPATIENT)
Dept: PHYSICAL THERAPY | Facility: CLINIC | Age: 66
DRG: 001 | End: 2021-05-26
Attending: INTERNAL MEDICINE
Payer: COMMERCIAL

## 2021-05-26 ENCOUNTER — APPOINTMENT (OUTPATIENT)
Dept: CT IMAGING | Facility: CLINIC | Age: 66
DRG: 001 | End: 2021-05-26
Attending: PHYSICIAN ASSISTANT
Payer: COMMERCIAL

## 2021-05-26 ENCOUNTER — APPOINTMENT (OUTPATIENT)
Dept: OCCUPATIONAL THERAPY | Facility: CLINIC | Age: 66
DRG: 001 | End: 2021-05-26
Attending: INTERNAL MEDICINE
Payer: COMMERCIAL

## 2021-05-26 DIAGNOSIS — Z11.59 ENCOUNTER FOR SCREENING FOR OTHER VIRAL DISEASES: ICD-10-CM

## 2021-05-26 PROBLEM — Z94.1 TRANSPLANTED HEART (H): Status: ACTIVE | Noted: 2021-02-05

## 2021-05-26 LAB
ABO + RH BLD: NORMAL
ABO + RH BLD: NORMAL
ALBUMIN SERPL-MCNC: 2.2 G/DL (ref 3.4–5)
ALP SERPL-CCNC: 123 U/L (ref 40–150)
ALT SERPL W P-5'-P-CCNC: 52 U/L (ref 0–70)
AMYLASE SERPL-CCNC: 49 U/L (ref 30–110)
ANION GAP SERPL CALCULATED.3IONS-SCNC: 5 MMOL/L (ref 3–14)
ANION GAP SERPL CALCULATED.3IONS-SCNC: 6 MMOL/L (ref 3–14)
AST SERPL W P-5'-P-CCNC: 26 U/L (ref 0–45)
BASOPHILS # BLD AUTO: 0 10E9/L (ref 0–0.2)
BASOPHILS NFR BLD AUTO: 0.1 %
BILIRUB DIRECT SERPL-MCNC: 0.3 MG/DL (ref 0–0.2)
BILIRUB SERPL-MCNC: 0.6 MG/DL (ref 0.2–1.3)
BLD GP AB SCN SERPL QL: NORMAL
BLD PROD TYP BPU: NORMAL
BLOOD BANK CMNT PATIENT-IMP: NORMAL
BUN SERPL-MCNC: 48 MG/DL (ref 7–30)
BUN SERPL-MCNC: 50 MG/DL (ref 7–30)
CALCIUM SERPL-MCNC: 8.3 MG/DL (ref 8.5–10.1)
CALCIUM SERPL-MCNC: 8.5 MG/DL (ref 8.5–10.1)
CHLORIDE SERPL-SCNC: 104 MMOL/L (ref 94–109)
CHLORIDE SERPL-SCNC: 106 MMOL/L (ref 94–109)
CO2 SERPL-SCNC: 26 MMOL/L (ref 20–32)
CO2 SERPL-SCNC: 27 MMOL/L (ref 20–32)
CREAT SERPL-MCNC: 1.76 MG/DL (ref 0.66–1.25)
CREAT SERPL-MCNC: 1.82 MG/DL (ref 0.66–1.25)
CRP SERPL-MCNC: 220 MG/L (ref 0–8)
CRYPTOC AG SPEC QL: NORMAL
DIFFERENTIAL METHOD BLD: ABNORMAL
EOSINOPHIL # BLD AUTO: 0 10E9/L (ref 0–0.7)
EOSINOPHIL NFR BLD AUTO: 0 %
ERYTHROCYTE [DISTWIDTH] IN BLOOD BY AUTOMATED COUNT: 17.4 % (ref 10–15)
GFR SERPL CREATININE-BSD FRML MDRD: 38 ML/MIN/{1.73_M2}
GFR SERPL CREATININE-BSD FRML MDRD: 40 ML/MIN/{1.73_M2}
GLUCOSE BLDC GLUCOMTR-MCNC: 255 MG/DL (ref 70–99)
GLUCOSE BLDC GLUCOMTR-MCNC: 256 MG/DL (ref 70–99)
GLUCOSE BLDC GLUCOMTR-MCNC: 281 MG/DL (ref 70–99)
GLUCOSE BLDC GLUCOMTR-MCNC: 290 MG/DL (ref 70–99)
GLUCOSE SERPL-MCNC: 234 MG/DL (ref 70–99)
GLUCOSE SERPL-MCNC: 380 MG/DL (ref 70–99)
HCT VFR BLD AUTO: 23.9 % (ref 40–53)
HCT VFR BLD AUTO: 24 % (ref 40–53)
HGB BLD-MCNC: 6.7 G/DL (ref 13.3–17.7)
HGB BLD-MCNC: 7.2 G/DL (ref 13.3–17.7)
HGB BLD-MCNC: 7.2 G/DL (ref 13.3–17.7)
IMM GRANULOCYTES # BLD: 0.2 10E9/L (ref 0–0.4)
IMM GRANULOCYTES NFR BLD: 1.3 %
LACTATE BLD-SCNC: 1.6 MMOL/L (ref 0.7–2)
LACTATE BLD-SCNC: 2.5 MMOL/L (ref 0.7–2)
LIPASE SERPL-CCNC: 25 U/L (ref 73–393)
LYMPHOCYTES # BLD AUTO: 0.1 10E9/L (ref 0.8–5.3)
LYMPHOCYTES NFR BLD AUTO: 0.6 %
MAGNESIUM SERPL-MCNC: 2.1 MG/DL (ref 1.6–2.3)
MAGNESIUM SERPL-MCNC: 2.2 MG/DL (ref 1.6–2.3)
MCH RBC QN AUTO: 30 PG (ref 26.5–33)
MCHC RBC AUTO-ENTMCNC: 30.1 G/DL (ref 31.5–36.5)
MCV RBC AUTO: 100 FL (ref 78–100)
MONOCYTES # BLD AUTO: 0.1 10E9/L (ref 0–1.3)
MONOCYTES NFR BLD AUTO: 0.9 %
NEUTROPHILS # BLD AUTO: 12.1 10E9/L (ref 1.6–8.3)
NEUTROPHILS NFR BLD AUTO: 97.1 %
NRBC # BLD AUTO: 0 10*3/UL
NRBC BLD AUTO-RTO: 0 /100
NUM BPU REQUESTED: 2
PLATELET # BLD AUTO: 153 10E9/L (ref 150–450)
POTASSIUM SERPL-SCNC: 4.1 MMOL/L (ref 3.4–5.3)
POTASSIUM SERPL-SCNC: 4.3 MMOL/L (ref 3.4–5.3)
PROCALCITONIN SERPL-MCNC: 7.76 NG/ML
PROT SERPL-MCNC: 4.9 G/DL (ref 6.8–8.8)
RBC # BLD AUTO: 2.4 10E12/L (ref 4.4–5.9)
SODIUM SERPL-SCNC: 136 MMOL/L (ref 133–144)
SODIUM SERPL-SCNC: 138 MMOL/L (ref 133–144)
SPECIMEN EXP DATE BLD: NORMAL
SPECIMEN SOURCE: NORMAL
TACROLIMUS BLD-MCNC: 10.6 UG/L (ref 5–15)
TME LAST DOSE: NORMAL H
WBC # BLD AUTO: 15.2 10E9/L (ref 4–11)

## 2021-05-26 PROCEDURE — 250N000013 HC RX MED GY IP 250 OP 250 PS 637: Performed by: SURGERY

## 2021-05-26 PROCEDURE — 250N000013 HC RX MED GY IP 250 OP 250 PS 637: Performed by: STUDENT IN AN ORGANIZED HEALTH CARE EDUCATION/TRAINING PROGRAM

## 2021-05-26 PROCEDURE — 258N000003 HC RX IP 258 OP 636: Performed by: NURSE PRACTITIONER

## 2021-05-26 PROCEDURE — 36415 COLL VENOUS BLD VENIPUNCTURE: CPT | Performed by: PHYSICIAN ASSISTANT

## 2021-05-26 PROCEDURE — 84145 PROCALCITONIN (PCT): CPT | Performed by: PHYSICIAN ASSISTANT

## 2021-05-26 PROCEDURE — 94640 AIRWAY INHALATION TREATMENT: CPT

## 2021-05-26 PROCEDURE — 80076 HEPATIC FUNCTION PANEL: CPT | Performed by: PHYSICIAN ASSISTANT

## 2021-05-26 PROCEDURE — 999N001017 HC STATISTIC GLUCOSE BY METER IP

## 2021-05-26 PROCEDURE — 83735 ASSAY OF MAGNESIUM: CPT | Performed by: PHYSICIAN ASSISTANT

## 2021-05-26 PROCEDURE — 85018 HEMOGLOBIN: CPT | Performed by: PHYSICIAN ASSISTANT

## 2021-05-26 PROCEDURE — 74176 CT ABD & PELVIS W/O CONTRAST: CPT | Mod: 26 | Performed by: RADIOLOGY

## 2021-05-26 PROCEDURE — 86900 BLOOD TYPING SEROLOGIC ABO: CPT | Performed by: PHYSICIAN ASSISTANT

## 2021-05-26 PROCEDURE — 80197 ASSAY OF TACROLIMUS: CPT | Performed by: PHYSICIAN ASSISTANT

## 2021-05-26 PROCEDURE — 250N000011 HC RX IP 250 OP 636: Performed by: NURSE PRACTITIONER

## 2021-05-26 PROCEDURE — 36592 COLLECT BLOOD FROM PICC: CPT | Performed by: PHYSICIAN ASSISTANT

## 2021-05-26 PROCEDURE — 87116 MYCOBACTERIA CULTURE: CPT | Performed by: INTERNAL MEDICINE

## 2021-05-26 PROCEDURE — 85014 HEMATOCRIT: CPT | Performed by: PHYSICIAN ASSISTANT

## 2021-05-26 PROCEDURE — 86850 RBC ANTIBODY SCREEN: CPT | Performed by: PHYSICIAN ASSISTANT

## 2021-05-26 PROCEDURE — 87385 HISTOPLASMA CAPSUL AG IA: CPT | Performed by: INTERNAL MEDICINE

## 2021-05-26 PROCEDURE — 250N000009 HC RX 250: Performed by: NURSE PRACTITIONER

## 2021-05-26 PROCEDURE — P9016 RBC LEUKOCYTES REDUCED: HCPCS | Performed by: PHYSICIAN ASSISTANT

## 2021-05-26 PROCEDURE — 86140 C-REACTIVE PROTEIN: CPT | Performed by: PHYSICIAN ASSISTANT

## 2021-05-26 PROCEDURE — 250N000011 HC RX IP 250 OP 636

## 2021-05-26 PROCEDURE — 74018 RADEX ABDOMEN 1 VIEW: CPT | Mod: 26 | Performed by: RADIOLOGY

## 2021-05-26 PROCEDURE — 85027 COMPLETE CBC AUTOMATED: CPT | Performed by: PHYSICIAN ASSISTANT

## 2021-05-26 PROCEDURE — 99232 SBSQ HOSP IP/OBS MODERATE 35: CPT | Performed by: PHYSICIAN ASSISTANT

## 2021-05-26 PROCEDURE — 94640 AIRWAY INHALATION TREATMENT: CPT | Mod: 76

## 2021-05-26 PROCEDURE — 250N000011 HC RX IP 250 OP 636: Performed by: PHYSICIAN ASSISTANT

## 2021-05-26 PROCEDURE — 80048 BASIC METABOLIC PNL TOTAL CA: CPT | Performed by: PHYSICIAN ASSISTANT

## 2021-05-26 PROCEDURE — 94664 DEMO&/EVAL PT USE INHALER: CPT

## 2021-05-26 PROCEDURE — 86923 COMPATIBILITY TEST ELECTRIC: CPT | Performed by: PHYSICIAN ASSISTANT

## 2021-05-26 PROCEDURE — 214N000001 HC R&B CCU UMMC

## 2021-05-26 PROCEDURE — G0463 HOSPITAL OUTPT CLINIC VISIT: HCPCS

## 2021-05-26 PROCEDURE — 83690 ASSAY OF LIPASE: CPT | Performed by: PHYSICIAN ASSISTANT

## 2021-05-26 PROCEDURE — 36592 COLLECT BLOOD FROM PICC: CPT | Performed by: INTERNAL MEDICINE

## 2021-05-26 PROCEDURE — 99233 SBSQ HOSP IP/OBS HIGH 50: CPT | Mod: GC | Performed by: INTERNAL MEDICINE

## 2021-05-26 PROCEDURE — 86901 BLOOD TYPING SEROLOGIC RH(D): CPT | Performed by: PHYSICIAN ASSISTANT

## 2021-05-26 PROCEDURE — 87040 BLOOD CULTURE FOR BACTERIA: CPT | Performed by: PHYSICIAN ASSISTANT

## 2021-05-26 PROCEDURE — 85004 AUTOMATED DIFF WBC COUNT: CPT | Performed by: PHYSICIAN ASSISTANT

## 2021-05-26 PROCEDURE — 71250 CT THORAX DX C-: CPT

## 2021-05-26 PROCEDURE — 250N000013 HC RX MED GY IP 250 OP 250 PS 637: Performed by: NURSE PRACTITIONER

## 2021-05-26 PROCEDURE — 97535 SELF CARE MNGMENT TRAINING: CPT | Mod: GO

## 2021-05-26 PROCEDURE — 83605 ASSAY OF LACTIC ACID: CPT | Performed by: PHYSICIAN ASSISTANT

## 2021-05-26 PROCEDURE — 97530 THERAPEUTIC ACTIVITIES: CPT | Mod: GP

## 2021-05-26 PROCEDURE — 71250 CT THORAX DX C-: CPT | Mod: 26 | Performed by: RADIOLOGY

## 2021-05-26 PROCEDURE — 250N000009 HC RX 250: Performed by: PHYSICIAN ASSISTANT

## 2021-05-26 PROCEDURE — 87899 AGENT NOS ASSAY W/OPTIC: CPT | Performed by: INTERNAL MEDICINE

## 2021-05-26 PROCEDURE — 82150 ASSAY OF AMYLASE: CPT | Performed by: PHYSICIAN ASSISTANT

## 2021-05-26 PROCEDURE — 999N000065 XR ABDOMEN PORT 1 VIEWS

## 2021-05-26 PROCEDURE — 250N000013 HC RX MED GY IP 250 OP 250 PS 637: Performed by: PHYSICIAN ASSISTANT

## 2021-05-26 RX ORDER — BISACODYL 10 MG
10 SUPPOSITORY, RECTAL RECTAL ONCE
Status: COMPLETED | OUTPATIENT
Start: 2021-05-26 | End: 2021-05-26

## 2021-05-26 RX ORDER — BUMETANIDE 0.25 MG/ML
2 INJECTION INTRAMUSCULAR; INTRAVENOUS ONCE
Status: COMPLETED | OUTPATIENT
Start: 2021-05-26 | End: 2021-05-26

## 2021-05-26 RX ORDER — IPRATROPIUM BROMIDE AND ALBUTEROL SULFATE 2.5; .5 MG/3ML; MG/3ML
3 SOLUTION RESPIRATORY (INHALATION) EVERY 4 HOURS PRN
Status: DISCONTINUED | OUTPATIENT
Start: 2021-05-26 | End: 2021-05-31 | Stop reason: HOSPADM

## 2021-05-26 RX ADMIN — MYCOPHENOLATE MOFETIL 1000 MG: 500 INJECTION, POWDER, LYOPHILIZED, FOR SOLUTION INTRAVENOUS at 22:38

## 2021-05-26 RX ADMIN — GABAPENTIN 600 MG: 250 SUSPENSION ORAL at 09:10

## 2021-05-26 RX ADMIN — POLYETHYLENE GLYCOL 3350 17 G: 17 POWDER, FOR SOLUTION ORAL at 09:08

## 2021-05-26 RX ADMIN — ALBUTEROL SULFATE 2 PUFF: 90 INHALANT RESPIRATORY (INHALATION) at 06:23

## 2021-05-26 RX ADMIN — PIPERACILLIN AND TAZOBACTAM 3.38 G: 3; .375 INJECTION, POWDER, LYOPHILIZED, FOR SOLUTION INTRAVENOUS at 21:04

## 2021-05-26 RX ADMIN — METHYLPREDNISOLONE SODIUM SUCCINATE 16 MG: 40 INJECTION, POWDER, FOR SOLUTION INTRAMUSCULAR; INTRAVENOUS at 09:13

## 2021-05-26 RX ADMIN — POLYETHYLENE GLYCOL 3350 17 G: 17 POWDER, FOR SOLUTION ORAL at 00:41

## 2021-05-26 RX ADMIN — LACTULOSE 20 G: 20 POWDER, FOR SOLUTION ORAL at 09:24

## 2021-05-26 RX ADMIN — PANTOPRAZOLE SODIUM 40 MG: 40 TABLET, DELAYED RELEASE ORAL at 09:10

## 2021-05-26 RX ADMIN — ACETAMINOPHEN ORAL SOLUTION 975 MG: 325 SOLUTION ORAL at 16:13

## 2021-05-26 RX ADMIN — VALGANCICLOVIR HYDROCHLORIDE 450 MG: 50 POWDER, FOR SOLUTION ORAL at 09:10

## 2021-05-26 RX ADMIN — METHYLPREDNISOLONE SODIUM SUCCINATE 12 MG: 40 INJECTION, POWDER, FOR SOLUTION INTRAMUSCULAR; INTRAVENOUS at 21:06

## 2021-05-26 RX ADMIN — GABAPENTIN 300 MG: 250 SUSPENSION ORAL at 09:12

## 2021-05-26 RX ADMIN — Medication 1 TABLET: at 09:09

## 2021-05-26 RX ADMIN — UMECLIDINIUM 1 PUFF: 62.5 AEROSOL, POWDER ORAL at 09:13

## 2021-05-26 RX ADMIN — GABAPENTIN 600 MG: 250 SUSPENSION ORAL at 21:05

## 2021-05-26 RX ADMIN — SODIUM CHLORIDE, PRESERVATIVE FREE 5 ML: 5 INJECTION INTRAVENOUS at 06:54

## 2021-05-26 RX ADMIN — NYSTATIN 1000000 UNITS: 500000 SUSPENSION ORAL at 09:12

## 2021-05-26 RX ADMIN — PIPERACILLIN AND TAZOBACTAM 3.38 G: 3; .375 INJECTION, POWDER, LYOPHILIZED, FOR SOLUTION INTRAVENOUS at 16:13

## 2021-05-26 RX ADMIN — GABAPENTIN 600 MG: 250 SUSPENSION ORAL at 00:41

## 2021-05-26 RX ADMIN — NYSTATIN 1000000 UNITS: 500000 SUSPENSION ORAL at 16:13

## 2021-05-26 RX ADMIN — BUMETANIDE 2 MG: 0.25 INJECTION, SOLUTION INTRAMUSCULAR; INTRAVENOUS at 21:51

## 2021-05-26 RX ADMIN — BUPROPION HYDROCHLORIDE 75 MG: 75 TABLET, FILM COATED ORAL at 16:13

## 2021-05-26 RX ADMIN — LIDOCAINE 2 PATCH: 560 PATCH PERCUTANEOUS; TOPICAL; TRANSDERMAL at 09:13

## 2021-05-26 RX ADMIN — NYSTATIN 1000000 UNITS: 500000 SUSPENSION ORAL at 21:05

## 2021-05-26 RX ADMIN — SENNOSIDES AND DOCUSATE SODIUM 1 TABLET: 8.6; 5 TABLET ORAL at 09:09

## 2021-05-26 RX ADMIN — Medication 1 TABLET: at 21:05

## 2021-05-26 RX ADMIN — HEPARIN SODIUM 5000 UNITS: 5000 INJECTION, SOLUTION INTRAVENOUS; SUBCUTANEOUS at 16:13

## 2021-05-26 RX ADMIN — NYSTATIN 1000000 UNITS: 500000 SUSPENSION ORAL at 12:46

## 2021-05-26 RX ADMIN — ACETAMINOPHEN ORAL SOLUTION 975 MG: 325 SOLUTION ORAL at 00:41

## 2021-05-26 RX ADMIN — ACETAMINOPHEN ORAL SOLUTION 975 MG: 325 SOLUTION ORAL at 09:08

## 2021-05-26 RX ADMIN — ALLOPURINOL 300 MG: 300 TABLET ORAL at 09:08

## 2021-05-26 RX ADMIN — PIPERACILLIN AND TAZOBACTAM 3.38 G: 3; .375 INJECTION, POWDER, LYOPHILIZED, FOR SOLUTION INTRAVENOUS at 02:49

## 2021-05-26 RX ADMIN — POLYETHYLENE GLYCOL 3350 17 G: 17 POWDER, FOR SOLUTION ORAL at 21:05

## 2021-05-26 RX ADMIN — Medication 50 MG: at 21:05

## 2021-05-26 RX ADMIN — BUPROPION HYDROCHLORIDE 75 MG: 75 TABLET, FILM COATED ORAL at 09:24

## 2021-05-26 RX ADMIN — DOCUSATE SODIUM 286 ML: 50 LIQUID ORAL at 16:30

## 2021-05-26 RX ADMIN — FLUTICASONE FUROATE AND VILANTEROL TRIFENATATE 1 PUFF: 100; 25 POWDER RESPIRATORY (INHALATION) at 09:13

## 2021-05-26 RX ADMIN — IPRATROPIUM BROMIDE AND ALBUTEROL SULFATE 3 ML: .5; 3 SOLUTION RESPIRATORY (INHALATION) at 12:35

## 2021-05-26 RX ADMIN — HEPARIN SODIUM 5000 UNITS: 5000 INJECTION, SOLUTION INTRAVENOUS; SUBCUTANEOUS at 09:13

## 2021-05-26 RX ADMIN — PANTOPRAZOLE SODIUM 40 MG: 40 TABLET, DELAYED RELEASE ORAL at 16:14

## 2021-05-26 RX ADMIN — IPRATROPIUM BROMIDE AND ALBUTEROL SULFATE 3 ML: .5; 3 SOLUTION RESPIRATORY (INHALATION) at 09:15

## 2021-05-26 RX ADMIN — DAPSONE 100 MG: 25 TABLET ORAL at 09:09

## 2021-05-26 RX ADMIN — BISACODYL 10 MG: 10 SUPPOSITORY RECTAL at 09:09

## 2021-05-26 RX ADMIN — PIPERACILLIN AND TAZOBACTAM 3.38 G: 3; .375 INJECTION, POWDER, LYOPHILIZED, FOR SOLUTION INTRAVENOUS at 09:10

## 2021-05-26 RX ADMIN — MYCOPHENOLATE MOFETIL 1000 MG: 500 INJECTION, POWDER, LYOPHILIZED, FOR SOLUTION INTRAVENOUS at 09:25

## 2021-05-26 RX ADMIN — ASPIRIN 81 MG CHEWABLE TABLET 81 MG: 81 TABLET CHEWABLE at 09:09

## 2021-05-26 RX ADMIN — Medication 37.5 MG: at 21:19

## 2021-05-26 RX ADMIN — MONTELUKAST 10 MG: 10 TABLET, FILM COATED ORAL at 21:06

## 2021-05-26 RX ADMIN — ROSUVASTATIN CALCIUM 10 MG: 10 TABLET, FILM COATED ORAL at 09:09

## 2021-05-26 RX ADMIN — TACROLIMUS 110 MCG/HR: 5 INJECTION, SOLUTION INTRAVENOUS at 21:04

## 2021-05-26 RX ADMIN — HEPARIN SODIUM 5000 UNITS: 5000 INJECTION, SOLUTION INTRAVENOUS; SUBCUTANEOUS at 00:41

## 2021-05-26 RX ADMIN — SENNOSIDES AND DOCUSATE SODIUM 1 TABLET: 8.6; 5 TABLET ORAL at 21:05

## 2021-05-26 ASSESSMENT — ACTIVITIES OF DAILY LIVING (ADL)
ADLS_ACUITY_SCORE: 15
ADLS_ACUITY_SCORE: 15
ADLS_ACUITY_SCORE: 14
ADLS_ACUITY_SCORE: 15
ADLS_ACUITY_SCORE: 14
ADLS_ACUITY_SCORE: 14

## 2021-05-26 ASSESSMENT — MIFFLIN-ST. JEOR: SCORE: 1773.31

## 2021-05-26 NOTE — PROGRESS NOTES
[facility]  Transplant Infectious Disease Progress Note     Patient:  Jim Willingham, Date of birth 1955, Medical record number [id number]  Date of Visit:  05/26/2021         Assessment and Recommendations:   Recommendations:     Continue zosyn for now.  Pending clinical course, will be able to narrow.  Consider pulmonary inpatient consult for evaluation and potential bronchoscopy.  Patient also needs good Good pulmonary toilet, which they may be able to help with  Follow up results of below tests ordered  - AFB blood culture  - Fungitell  - Galactomannan aspergillus Ag  - Histoplasma urine Ag  - Cryptococcus Ag  Restart oral dapsone when GI issues resolve.  Repeat CRP tomorrow due to rapid increase on todays test     Thank you very much for this consultation. Transplant Infectious Disease will continue to follow with you.     Assessment:     Jim Willingham is a 64 yo M who underwent orthotopic heart transplantation 5/6/21, previously had a HeartMate II LVAD for non ischemic cardiomyopathy.  Transplant ID has been consulted for potential aspiration vs HCAP pneumonia.     Patient has had persistent leukocytosis since transplant, largely due to steroids.  In the last several days his WBC has been trending up.  He has had chest imaging that shows a slightly elevated left sided hemidiaphragm with some bilateral opacities and small bilateral pleural effusions.  Notable when right heart catheterizations have been performed he has been intravascularly euvolemic but has some third space edema from hypoalbuminemia.  A chest CT from 5/18 showed Consolidative opacity in the left lung base with groundglassopacities in both lung bases concerning for infection including atypical infection such as aspiration.  Aspiration would be less likely considering his right side looks ok, but not impossible.       For time being, can continue empiric antibiotics with zosyn for potential hospital acquired  pneumonia, which was transitioned from levofloxacin.  We will workup additional fungal issues as well, including aspergillus, histoplasma, cryptococcus, with a fungitell.  Will also add on AFB blood cultures. Legionella urine Ag was negative     Immunosuppression - currently on tacrolimus (goal 10-12) and cellcept 1500mg BID in addition to prolonged steroid taper (currently 20 AM, 15 PM).  He did receive basilixumab protocol     Infectious Disease issues include:  - Serostatus: CMV: D+/R+, EBV: D+/R+, Toxo D-/R-  - QTc interval: 449  - Pneumocystis prophylaxis: Dapsone 100 (increased from 50 5/22), currently off due to potential bowel obstruction, will need to restart when able  - Viral serostatus & prophylaxis: Valcyte 450 mg  - Fungal prophylaxis: Nystatin  - Isolation status: Good hand hygiene.     Discussed with attending Dr. Rutherford.     Riaz Montaño MD   Pager [phone]         Interval History:       Transplants:  5/6/2021 (Heart), Postoperative day:  20.  Coordinator Yolette Rueda    Patient feels much improved after NG decompression.  Breathing better as well         Current Medications & Allergies:       acetaminophen  975 mg Oral or Feeding Tube Q8H     allopurinol  300 mg Oral Daily     amitriptyline  37.5 mg Oral At Bedtime     aspirin  81 mg Oral Daily     bumetanide  2 mg Intravenous Once     buPROPion  75 mg Oral or Feeding Tube BID    And     buPROPion  50 mg Oral or Feeding Tube QPM     calcium citrate-vitamin D  1 tablet Oral BID     cyanocobalamin  1,000 mcg Intramuscular Q30 Days     dapsone  100 mg Oral Daily     fluticasone-vilanterol  1 puff Inhalation Daily     gabapentin  300 mg Oral or Feeding Tube Daily     gabapentin  600 mg Oral or Feeding Tube BID     heparin ANTICOAGULANT  5,000 Units Subcutaneous Q8H     heparin lock flush  5-10 mL Intracatheter Q24H     insulin aspart  1-10 Units Subcutaneous TID AC     insulin aspart  1-7 Units Subcutaneous At Bedtime     insulin aspart    Subcutaneous TID w/meals     [Held by provider] insulin glargine  30 Units Subcutaneous QAM AC     lidocaine  2 patch Transdermal Q24H     lidocaine   Transdermal Q8H     methylPREDNISolone  12 mg Intravenous QPM     methylPREDNISolone  16 mg Intravenous QAM     montelukast  10 mg Oral At Bedtime     mycophenolate mofetil  1,000 mg Intravenous Q12H ELI     nystatin  1,000,000 Units Swish & Swallow 4x Daily     pantoprazole  40 mg Oral or Feeding Tube BID AC     pink lady enema  286 mL Rectal Once     piperacillin-tazobactam  3.375 g Intravenous Q6H     polyethylene glycol  17 g Oral BID     rosuvastatin  10 mg Oral Daily     scopolamine  1 patch Transdermal Q72H    And     scopolamine   Transdermal Q8H     senna-docusate  1 tablet Oral BID     sodium chloride (PF)  3 mL Intracatheter Q8H     umeclidinium  1 puff Inhalation Daily     valGANciclovir  450 mg Oral or NG Tube Daily       Infusions/Drips:    dextrose       - MEDICATION INSTRUCTIONS -       ACE/ARB/ARNI NOT PRESCRIBED       tacrolimus (Prograf) infusion ADULT 110 mcg/hr (05/26/21 1118)       Allergies   Allergen Reactions     Beer Shortness Of Breath     Grass Shortness Of Breath     Ace Inhibitors Cough     Dust Mites Other (See Comments)     Asthma     Mold Other (See Comments)     Asthma     Penicillins Other (See Comments)     Unknown - childhood exposure    Tolerated Zosyn 9/18-9/20/2020     Sulfa Drugs Other (See Comments) and Unknown     Unknown childhood reaction            Physical Exam:   Vitals were reviewed.  All vitals stable.  Patient Vitals for the past 24 hrs:   BP Temp Temp src Pulse Resp SpO2 Weight   05/26/21 1253 113/72 97.4  F (36.3  C) Oral 108 18 95 % --   05/26/21 1235 -- -- -- -- -- 95 % --   05/26/21 0830 -- -- -- -- -- 95 % --   05/26/21 0828 104/75 97.3  F (36.3  C) Oral 103 18 -- --   05/26/21 0600 -- -- -- -- -- -- 100.4 kg (221 lb 4.8 oz)   05/26/21 0343 112/73 97.9  F (36.6  C) Oral 95 20 98 % --   05/26/21 0240 -- -- -- 95  -- 99 % --   05/26/21 0100 -- -- -- -- -- 97 % --   05/26/21 0000 124/80 99.3  F (37.4  C) Oral 98 20 100 % --   05/25/21 2100 126/78 99.1  F (37.3  C) Oral 103 20 98 % --   05/25/21 1533 99/71 98.7  F (37.1  C) Oral 111 20 98 % --     Ranges for vital signs:  Temp:  [97.3  F (36.3  C)-99.3  F (37.4  C)] 97.4  F (36.3  C)  Pulse:  [] 108  Resp:  [18-20] 18  BP: ()/(71-80) 113/72  SpO2:  [95 %-100 %] 95 %  Vitals:    05/24/21 0307 05/25/21 0428 05/26/21 0600   Weight: 105.8 kg (233 lb 4.8 oz) 103.1 kg (227 lb 6.4 oz) 100.4 kg (221 lb 4.8 oz)     Physical Examination:  GENERAL:  well-developed, well-nourished, in bed with no acute distress.  HEAD:  Head is normocephalic, atraumatic.  NG tube in  EYES:  Eyes have anicteric sclerae without conjunctival injection   NECK:  Supple. No cervical lymphadenopathy  LUNGS:  Clear to auscultation bilateral. Normal work of breathing on room air  CARDIOVASCULAR:  Tachycardic but normal rhythm with no murmurs, gallops or rubs.  ABDOMEN:  Bowel sound, non tender  SKIN:  Mild lower extremity edema  NEUROLOGIC:  Grossly nonfocal. Active x4 extremities         Laboratory Data:     No results found for: ACD4    Inflammatory Markers    Recent Labs   Lab Test 05/26/21  0705 05/24/21  0544 05/20/21  0803 05/18/21  0746 05/17/21  0530 05/16/21  0555 10/27/20  1250 10/27/20  1250 10/20/20  1305 10/13/20  1320 10/06/20  1320 09/30/20  1130 04/13/18  1437 04/13/18  1437   SED  --   --   --   --   --   --   --  10 8 10 20 9  --  18   .0* 4.0 6.9 6.0 6.9 8.2*   < > 3.5 11.0* 12.0* 5.7 0.6*   < > 9.4*    < > = values in this interval not displayed.       Immune Globulin Studies     Recent Labs   Lab Test 12/21/20  1133      IGM 55          Metabolic Studies       Recent Labs   Lab Test 05/26/21  0705 05/25/21  1627 05/24/21  0544 05/24/21  0544 05/08/21  0848 05/08/21  0848 05/08/21  0340 05/06/21  1535 05/06/21  1535 05/04/21  2313 05/04/21  2313    139   <  > 137   < >  --  137   < > 136   < > 135   POTASSIUM 4.3 4.4   < > 3.9   < >  --  4.2   < > 5.2   < > 4.0   CHLORIDE 106 105   < > 103   < >  --  105   < > 103   < > 101   CO2 27 25   < > 28   < >  --  21   < > 25   < > 26   ANIONGAP 5 9   < > 6   < >  --  11   < > 9   < > 7   BUN 50* 53*   < > 59*   < >  --  82*   < > 58*   < > 50*   CR 1.76* 1.94*   < > 1.97*   < >  --  2.53*   < > 2.22*   < > 1.60*   GFRESTIMATED 40* 36*   < > 35*   < >  --  26*   < > 30*   < > 45*   * 154*   < > 86   < >  --  145*   < > 201*   < > 115*   A1C  --   --   --   --   --   --   --   --   --   --  5.1   QAMAR 8.3* 8.1*   < > 8.2*   < >  --  7.7*   < > 8.5   < > 9.1   PHOS  --   --   --   --   --   --   --   --  5.7*   < > 3.8   MAG 2.2 2.0   < > 2.0   < >  --   --    < > 3.0*   < > 2.3   LACT  --   --   --   --   --  1.0 1.2   < >  --    < >  --    PCAL  --   --   --  0.08   < >  --   --   --   --   --   --     < > = values in this interval not displayed.       Hepatic Studies    Recent Labs   Lab Test 05/26/21  1340 05/24/21  0544 05/20/21  0803 05/10/21  0330 05/10/21  0330 04/30/21  0514 04/30/21  0514   BILITOTAL 0.6 0.8 0.5   < > 0.6   < >  --    DBIL 0.3* 0.3* 0.2   < > 0.3*   < >  --    ALKPHOS 123 147 102   < > 78   < >  --    PROTTOTAL 4.9* 5.5* 5.3*   < > 5.0*   < >  --    ALBUMIN 2.2* 2.8* 2.8*   < > 2.8*   < >  --    AST 26 28 28   < > 34   < >  --    ALT 52 58 46   < > 48   < >  --    LDH  --   --   --   --  386*  --  317*    < > = values in this interval not displayed.       Pancreatitis testing    Recent Labs   Lab Test 05/26/21  1340 05/04/21  2313 11/05/20  0907 08/05/20  0335   AMYLASE 49 93  --   --    LIPASE 25*  --   --  168   TRIG  --   --  91  --        Gout Labs      Recent Labs   Lab Test 03/12/21  0605 02/01/21  1127 01/09/21  0538 12/21/20  1133 11/30/20  0808   URIC 4.8 3.9 5.0 4.5 3.9       Hematology Studies      Recent Labs   Lab Test 05/26/21  1340 05/26/21  0705 05/25/21  0453 05/24/21  0544  05/23/21  0542 05/22/21  0605 05/22/21  0605 05/21/21  0441 05/17/21  0530 05/17/21  0530 05/15/21  0533 05/15/21  0533   WBC  --  15.2* 17.2* 14.8* 12.4*  --  13.9* 12.9*   < > 18.7*   < > 17.6*   ANEU  --   --   --   --   --   --   --   --   --  17.2*  --  15.7*   ALYM  --   --   --   --   --   --   --   --   --  0.6*  --  0.6*   VIJAY  --   --   --   --   --   --   --   --   --  0.6  --  0.9   AEOS  --   --   --   --   --   --   --   --   --  0.0  --  0.0   HGB 6.7* 7.2* 8.2* 8.2* 7.4*   < > 7.2* 8.0*   < > 7.8*   < > 8.2*   HCT  --  23.9* 27.5* 26.8* 23.7*  --  23.6* 25.7*   < > 24.5*   < > 26.0*   PLT  --  153 187 220 240  --  252 275   < > 292   < > 284    < > = values in this interval not displayed.       Clotting Studies    Recent Labs   Lab Test 05/12/21  0622 05/10/21  0330 05/07/21  0340 05/06/21  0650   INR 1.19* 1.33* 1.38* 1.45*   PTT  --   --   --  35       Iron Testing    Recent Labs   Lab Test 05/26/21  0705 05/13/21  0534 05/13/21  0534 02/09/21  0518 02/09/21  0518 01/21/21  1350 01/21/21  1350   IRON  --   --  38  --  28*  --  36   FEB  --   --  215*  --  285  --  381   IRONSAT  --   --  18  --  10*  --  10*   VITA  --   --  98  --  33  --   --       < > 94   < >  --    < >  --    FOLIC  --   --  14.4  --   --   --   --    B12  --   --  734  --   --    < >  --     < > = values in this interval not displayed.       Arterial Blood Gas Testing    Recent Labs   Lab Test 05/09/21  0956 05/09/21  0902 05/09/21 0344 05/08/21 2004 05/07/21 1900 05/07/21  1900 05/07/21  1700 05/07/21  1700 05/07/21  1420 05/07/21  0340 05/07/21  0340 05/06/21  7332   PH  --   --   --   --   --  7.40  --  7.38 7.39  --  7.38 7.37   PCO2  --   --   --   --   --  38  --  41 40  --  41 44   PO2  --   --   --   --   --  101  --  102 130*  --  153* 148*   HCO3  --   --   --   --   --  23  --  24 24  --  24 25   O2PER 21 21 REPORT AMENDED: 21.0 REPORT AMENDED:   < > 2LNC   < > Canceled, Test credited  2L 40   < > 40.0   40.0 40  40    < > = values in this interval not displayed.        Thyroid Studies     Recent Labs   Lab Test 02/01/21  1127 12/18/20  1018 06/24/20  0747 07/31/19  1050 07/03/19  0828   TSH 3.52 3.45 1.30 1.23 0.82       Urine Studies     Recent Labs   Lab Test 05/25/21  1230 05/16/21  0528 05/15/21  1730 05/07/21  0804 05/05/21  0515 03/08/21  1525   URINEPH 5.5 5.5 Unsatisfactory specimen - collected in wrong container 5.0 6.0 6.0   NITRITE Negative Negative Unsatisfactory specimen - collected in wrong container Negative Negative Negative   LEUKEST Negative Negative Unsatisfactory specimen - collected in wrong container Negative Negative Negative   WBCU 4 1  --  8* 0 0       CSF testing   No lab results found.    Invalid input(s): CADAM, EVPCR, ENTPCR, ENTEROVIRUS    Medication levels    Recent Labs   Lab Test 05/25/21  0453 05/08/21 2004 05/08/21 2004   VANCOMYCIN  --   --  18.5   TACROL 6.4   < >  --     < > = values in this interval not displayed.       Body fluid stats    Recent Labs   Lab Test 05/25/21  1015   GS <10 Squamous epithelial cells/low power field  >25 PMNs/low power field  Many  Mixed gram negative and positive jaxon         Microbiology:  Fungal testing  No lab results found.    Invalid input(s): HIFUN FUNGL    Last Culture results with specimen source  Culture Micro   Date Value Ref Range Status   05/26/2021 No growth after 5 hours  Preliminary   05/25/2021 Heavy growth  Normal jaxon to date    Preliminary   05/25/2021 Culture in progress  Preliminary   05/17/2021 No growth  Final   05/17/2021 No growth  Final   05/15/2021 No growth  Final   11/04/2020 No growth  Final   09/23/2020 No growth  Final   09/22/2020 No growth  Final   09/21/2020 No growth  Final   09/20/2020 No growth  Final   09/19/2020 No growth  Final   09/18/2020 (A)  Final    Cultured on the 2nd day of incubation:  Staphylococcus hominis  Susceptibility testing done on previous specimen     09/18/2020   Final     Critical Value/Significant Value, preliminary result only, called to and read back by  Tamy Leventhal RN 1724 9/20/20 AM     09/18/2020 No growth  Final   09/17/2020 (A)  Final    Cultured on the 1st day of incubation:  Staphylococcus hominis     09/17/2020   Final    Critical Value/Significant Value, preliminary result only, called to and read back by  John Howard RN @1414 09/18/2020 hdh/lm     09/17/2020   Final    (Note)  POSITIVE for Staphylococci other than S.aureus, S.epidermidis and  S.lugdunensis, by Royal Winsigene multiplex nucleic acid test.  Coagulase-negative staphylococci are the most common venipuncture or  collection associated skin CONTAMINANTS grown in blood cultures.  Final identification and antimicrobial susceptibility testing will be  verified by standard methods.    Specimen tested with Verigene multiplex, gram-positive blood culture  nucleic acid test for the following targets: Staph aureus, Staph  epidermidis, Staph lugdunensis, other Staph species, Enterococcus  faecalis, Enterococcus faecium, Streptococcus species, S. agalactiae,  S. anginosus grp., S. pneumoniae, S. pyogenes, Listeria sp., mecA  (methicillin resistance) and Lucía/B (vancomycin resistance).    Critical Value/Significant Value called to and read back by Le Carmona RN. @1712. 9.18.20. BS.       Specimen Description   Date Value Ref Range Status   05/26/2021 Blood PURPLE PORT  Final   05/26/2021 Blood  Final   05/25/2021 Urine  Final   05/25/2021 Sputum  Final   05/25/2021 Sputum  Final   05/17/2021 Blood  Final   05/17/2021 Blood  Final   05/15/2021 Blood PURPLE PORT  Final   05/06/2021 Nares  Final   11/04/2020 Blood Unspecified Site  Final   09/23/2020 Blood Left Arm  Final   09/22/2020 Blood Left Arm  Final   09/21/2020 Blood Left Arm  Final   09/20/2020 Blood Left Arm  Final   09/19/2020 Blood Left Arm  Final   09/18/2020 Blood Right Arm  Final        Last check of C difficile  No results found for: CDBPCT    Infection  Studies to assess Diarrhea No lab results found.    Virology:  Coronavirus-19 testing    Recent Labs   Lab Test 05/20/21  0640 05/12/21  1000 05/04/21  2357 04/23/21  2055 01/04/21  0947 01/04/21  0947 09/17/20  1600 08/05/20  0938 08/01/20  1126   [id number] Nasopharyngeal Nasopharyngeal Nasopharyngeal Nasopharyngeal   < > Nasopharyngeal Nasopharyngeal Nasopharyngeal  --    SARSCOVRES NEGATIVE NEGATIVE NEGATIVE NEGATIVE   < > NEGATIVE NEGATIVE NEGATIVE  --    [id number]  --   --   --   --   --  Nasopharyngeal Nasopharyngeal Nasopharyngeal Nasopharyngeal   [id number]  --   --   --   --   --  Test received-See reflex to IDDL test SARS CoV2 (COVID-19) Virus RT-PCR Test received-See reflex to IDDL test SARS CoV2 (COVID-19) Virus RT-PCR Test received-See reflex to IDDL test SARS CoV2 (COVID-19) Virus RT-PCR Not Detected    < > = values in this interval not displayed.       Respiratory virus (non-coronavirus-19) testing    Recent Labs   Lab Test 03/05/21  1655 02/28/21  1020 02/12/21  0915 01/15/20  2210   AFLU  --   --   --  Negative   IFLUA Not Detected Not Detected Not Detected Not Detected   INFZA  --  Negative  --   --    FLUAH1 Not Detected Not Detected Not Detected Not Detected   [id number] Not Detected Not Detected Not Detected Not Detected   FLUAH3 Not Detected Not Detected Not Detected Not Detected   BFLU  --   --   --  Positive*   IFLUB Not Detected Not Detected Not Detected Detected, Abnormal Result*   INFZB  --  Negative  --   --    PIV1 Not Detected Not Detected Not Detected Not Detected   PIV2 Not Detected Not Detected Not Detected Not Detected   PIV3 Not Detected Not Detected Not Detected Not Detected   PIV4 Not Detected Not Detected Not Detected Not Detected   RSVA Not Detected Not Detected Not Detected Not Detected   RSVB Not Detected Not Detected Not Detected Not Detected   HMPV Not Detected Not Detected Not Detected Not Detected   ADENOV Not Detected Not Detected Not Detected Not Detected   CORONA Not  Detected Not Detected Not Detected Not Detected       No results found for: CMVLOG    No results found for: H6RES    No results found for: EBRES    No results found for: 15959, BKRES    Parvovirus Testing  No lab results found.    Invalid input(s): PRVRES    Adenovirus Testing  No lab results found.    Invalid input(s): ADENAB, ADAG, ADENOVIRUS, ADQT    Imaging:  Recent Results (from the past 48 hour(s))   XR Abdomen Port 1 View    Narrative    Exam: XR ABDOMEN PORT 1 VIEWS, 5/25/2021 8:06 AM    Indication: abd discomfort, no BM x 4 days    Comparison: 5/24/2021    Findings:     Postsurgical changes in the epigastric region with suture similar to  prior. Overall paucity of bowel gas throughout the abdomen with no  dilated loops to suggest obstruction. Moderate to large colonic stool  burden. No definite pneumatosis or free air.    Partially visualized subcutaneous air in the lower left chest. Small  bilateral pleural effusions. Degenerative changes of the spine.      Impression    Impression: Continued paucity of bowel gas throughout the abdomen. No  findings to strongly suggest obstruction. Moderate to large colonic  stool burden.    I have personally reviewed the examination and initial interpretation  and I agree with the findings.    TERA LEGGETT, DO   XR Chest Port 1 View    Narrative    Exam: XR CHEST PORT 1 VIEW, 5/25/2021 8:07 AM    Indication: R sided pain, recent pleural tube removal    Comparison: 5/24/2021    Findings: Single AP chest radiograph. Right upper extremity PICC  terminating in the mid SVC. Abandoned cardiac pacemaker lead  terminating over the mid SVC. Median sternotomy wires, unchanged  fracturing of the most superior wire. Trachea is midline.  Cardiomediastinal silhouette is stable. Persistent bibasilar mixed  interstitial airspace opacities. Small bilateral effusions. No  pneumothorax. Resolving subcutaneous emphysema throughout the chest  walls.      Impression    Impression:   1.  Increased bibasilar mixed interstitial and airspace opacities,  likely atelectasis versus pulmonary edema.  2. Small bilateral pleural effusions.  3. No pneumothorax.    I have personally reviewed the examination and initial interpretation  and I agree with the findings.    ILYA HOBBS MD   XR Nasal Gastric Tube Placement    Narrative    FEEDING TUBE PLACEMENT 5/25/2021 11:58 AM    INDICATION: Needs decompression, history of Sylvester-en-Y bypass    COMPARISON: X-ray abdomen 5/25/2021.    FLUOROSCOPY TIME: 1.2 minutes.    FINDINGS: The feeding tube was advanced under fluoroscopic guidance  with final position of tip in the gastric pouch. A small amount of  barium was injected to demonstrate placement in the fundal pouch.  Reflux of clear gastric contents began to return and the tube was  secured via adhesive tape. There were no complications of the  procedure.      Impression    IMPRESSION: Uncomplicated NG tube placement with tip in the gastric  pouch.    I, JOANNE MCMAHAN MD, attest that I was present for all critical  portions of the procedure and was immediately available to provide  guidance and assistance during the remainder of the procedure.    I have personally reviewed the examination and initial interpretation  and I agree with the findings.    JOANNE MCMAHAN MD   XR Gastrografin Challenge    Narrative    Exam: XR GASTROGRAFIN CHALLENGE, 5/25/2021 11:08 PM    Indication: Gastrografin UGI Challenge    Comparison: Abdominal radiograph 5/25/2021, CT CAP 5/18/2021    Findings:   Supine frontal views of the abdomen obtained 8 hours, 9 hours, and 11  hours after administration of Gastrografin via NG tube.    On the 8 hour film, Gastrografin is seen to the mid transverse colon.  On the 9 hour film, contrast is progressed to the splenic flexure. On  the 11 hour film contrast is in the proximal descending colon.    Nonobstructive bowel gas pattern. Gastric tube tip projects over the  stomach. Small bilateral pleural  effusions and bibasilar opacities.      Impression    Impression:   By 11 hours after administration of Gastrografin, contrast is seen in  the proximal descending colon. No evidence of bowel obstruction.     I have personally reviewed the examination and initial interpretation  and I agree with the findings.    WILNER CADET MD   XR Abdomen Port 1 View    Narrative    Exam: XR ABDOMEN PORT 1 VIEWS, 5/26/2021 9:08 AM    Indication: recheck abd after gastrografin 5/25    Comparison: Gastrografin challenge 5/25/2021. CT chest abdomen pelvis  5/18/2021.    Findings:   Portable AP supine radiograph. Radiograph approximately 20 hours  post-Gastrografin ingestion (compared to time stamp on 8:00 PM  radiograph 5/25/2021). Gastric tube tip and sidehole overlying the  proximal stomach. 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 There is some residual contrast demonstrated in  the region of the gastric pouch. Gastrografin contrast material  demonstrated throughout the colon with slight distal progression from  prior study into the descending colonic region. No air filled  distended small bowel loops. Limited assessment for free air on supine  imaging.    Postsurgical changes in the thorax demonstrated with sternotomy wires,  partially visualized abandoned pacemaker tip and interstitial airspace  changes. Please see recent chest radiograph.      Impression    Impression: On this approximate 20 hour post-Gastrografin  administration radiograph, there has been slight distal progression of  Gastrografin into the descending colonic region. No air filled  distended small bowel loops.    JEROME CUMMINGS MD

## 2021-05-26 NOTE — PLAN OF CARE
Neuro: A/Ox4. Continues to be lethargic between cares.  Cardiac: ST with HR . VSS.   Respiratory: O2 sats stable on 2L O2 NC, weaned down from 4 L. Did not wear CPAP due to NGT.  GI/: NG to LIS. Clamped 1 hr after all meds. Denies nausea- no antiemetics given. Voiding in urinal with assist.   Diet/appetite: Ice chips while NGT in   Activity:  Up with assist of 1 and GB to chair.   Pain: Denies pain.   Skin: Midsternal inc intact, Old CT sites with serous drainage- drsg changed.   LDA's: PICC, PIV  Tacro gtt infusing as ordered. BID IV Cellcept. Gastrograffin challenge competed yesterday and pt had Xray x 3 to monitor progress. No signs of obstruction yet. May need one more xray this am.     Plan: RHC today or tomorrow. Continue with POC. Notify primary team with changes.

## 2021-05-26 NOTE — PROGRESS NOTES
CLINICAL NUTRITION SERVICES - REASSESSMENT NOTE     Nutrition Prescription    RECOMMENDATIONS FOR MDs/PROVIDERS TO ORDER:  If nutrition support becomes the plan of care, see future/additional recs below.     Malnutrition Status:    Non-severe malnutrition in the context of acute illness/injury    Recommendations already ordered by Registered Dietitian (RD):  None additionally at this time.     Future/Additional Recommendations:  1. Diet order as per team, pending GI status.   2. Continue bowel regimen as per team.   3. Watch potassium trends and monitor phos. K+ was 4.3 on 5/26 (WNL) and phos was 5.7 (elevated on 5/6, last lab available).   4. Monitor glycemic control. DM 2. Hgb A1c of 6.2 on 1/7/21. BG was 65 mg/dL on 5/24 and 67 on 5/24. Most recent BG was 281 on 5/26.   5. Continue with Calcium Citrate containing vitamin D supplementation as patient is on steroids, as appropriate.  6. Order the following (Sylvester-en-y Gastric Bypass) if pt agreeable:    Multivitamin/minerals: adult dose 2 times daily    Iron: 45-60 mg elemental daily (18-36 mg daily if low risk) - may partly or fully be covered in multivitamin     Calcium Citrate containing vitamin D: Consider additional if warranted.     Vitamin B12: sublingual form of at least 500 mcg daily or injection of 1000 mcg monthly. Pt received B12 on 5/13. Ordered to receive vitamin B12 Q 30 days.     7. If TFs are needed, rec place feeding tube (FT) via radiology due to RNY hx and initiate TFs, Osmolite 1.5 (concentrated, maintenance TF formula) and order Prosource TF modular. Initiate TFs at 15 mL/hr, advancing rate by 10 mL Q 8 hrs (or per provider discretion, pending hemodynamic stability) to goal rate of 65 mL/hr. Osmolite 1.5 at goal of 65 mL/hr and 2 pkts Prosource TF modular daily: 1560 mL TF/day, 2340+ kcals, 97+ g PRO, 1188 ml free H20, 317 g CHO, and 0 g fiber daily.                 If begin tube feeds:               - Flush FT with 30 mL water Q 4 hrs for  "patency. Rec provider adjust free water flushes as needed, pending fluid status.              - Ensure K+/Mg++/Phos labs are ordered daily until TFs advance to goal infusion to evaluate for sx of refeeding with nutrition received. If lytes trend low, aggressively replace lytes.                  - If not already ordered, order a multivitamin/mineral (certavite 15 mL/day via FT) to help ensure micronutrient needs being met with suspected hypermetabolic demands and potential interruptions to TF infusions.              - Monitor TF and possible need to adjust nutrition support regimen if necessary, pending medical course and nutrition status.                  - Monitor need to check a metabolic cart study.                - Send a nutrition consult for \"Registered Dietitian to Order TF per Medical Nutrition Therapy Guidelines\" if desire RD to order TFs.   8. If pt is not a candidate for diet advancement or TF start, then rec central parenteral nutrition support therapy. Place \"Pharmacy/Nutrition to start & manage TPN\" order. Would rec the following: Dosing wt: 76 kg, 1200 mL/day (or volume per PharmD) of 150 g dex, 105 g AA, and 250 mL of 20% IV lipids daily. If Phos is 2 or greater, K+/Mg++ WNL, and glycemic control acceptable, increase dextrose by 60 g/day to goal dextrose of 310 g/day. Check a baseline triglyceride lab and then weekly. Ensure pt is on non-dextrose-containing IVFs. Regimen provides 1974 kcals (26 kcals/kg), 105 g protein (1.4 g protein/kg), 310 g CHO, GIR 2.8, and 25% of kcals from fat on average daily.       EVALUATION OF THE PROGRESS TOWARD GOALS   Diet: Clear Liquids since 5/26 am. NGT is currently clamped. Has orders for Glucerna at 14:00 (chocolate and butter pecan). Has a prn snack/supplement order and a prn order for Special K bars. Previously on a regular diet 5/20-5/26.   Intake: Per % intake flowsheets, pt consuming 100% with a good appetite 5/19, 75% with a good appetite 5/20, 100% with a " "good appetite 5/21, % with a good appetite 5/22, and % with a good appetite 5/23. [employer] (meal ordering system) indicates food/beverages sent 5/23-5/25 totaled 1178 kcals and 66 g protein daily on average. Estimated needs are 4022-8639 kcals/day (30-35 kcals/kg) and  grams protein/day (1.3-1.5+ grams of pro/kg). Three meals were ordered on 5/23 and then only one meal 5/24 and 5/25. Pt was sleeping when attempting to see, NGT in place. Noticed a fridge in his room and had a surplus of Glucerna in his room.      NEW FINDINGS   Wt Hx: 118.4 kg (2/13/20), 106.2 kg (8/7/20), 103.9 kg (11/13/20), 94.8 kg (1/12/21), 99.5 kg (2/8/21, admit), 100.2 kg (2/15/21), 98.2 kg (2/22), 100 kg (3/2), 99.7 kg (3/11), 99.9 kg (4/9), 101.8 kg (4/26), 102.3 kg (5/5), 110.2 kg (5/12/21), 108.9 kg (5/18/21), 100.4 kg (5/26/21) - Weight fluctuations, likely due to fluid shifts and post-op status. Difficult to assess true weight loss/gain at this time. Current wt is just greater than admit wt.   GI: Had emesis on 5/24 and 5/25. One emesis was after eating tuna fish per team. Pt having zero to two stools daily. Stools are formed and soft/brown with last stool on 5/26. Has orders for scheduled bowel meds and lactulose. Per team 5/26, \"nausea with emesis. Placed NG tube 5/25 with low intermittent suction. Gastrograffin challenge 5/25, contrast traveled to colon. He is feeling better 5/26 am. Pink Lady enema 5/26 at 3 pm.\" .     MALNUTRITION  % Intake: < 75% for > 7 days (non-severe)  % Weight Loss: Difficult to assess due to fluid status changes   Subcutaneous Fat Loss: None observed  Muscle Loss: None observed  Fluid Accumulation/Edema: Trace to mild  Malnutrition Diagnosis: Non-severe malnutrition in the context of acute illness/injury    Previous Goals   Patient to consume % of nutritionally adequate meal trays TID, or the equivalent with supplements/snacks.  Evaluation: Not meeting currently.     Previous " Nutrition Diagnosis  Inadequate oral intake related to decreased appetite, menu fatigue as evidenced by patient consuming < 75% for > 7 days  Evaluation: Unresolved.     CURRENT NUTRITION DIAGNOSIS  Inadequate oral intake related to GI sx and diet order as evidenced by pt currently on clear liquids and [employer] (meal ordering system) indicates food/beverages sent 5/23-5/25 totaled 1178 kcals and 66 g protein daily on average while estimated needs are 2824-9336 kcals/day (30-35 kcals/kg) and  grams protein/day (1.3-1.5+ grams of pro/kg).    INTERVENTIONS  Implementation  Collaboration with other providers: Discussed pt with team. Team aware of nutrition support recs in nutrition note, if needed.   Medical food supplement therapy: Changed scheduled Glucerna to a prn order (currently not being sent due to pt on clear liquids).     Goals  Patient to consume % of nutritionally adequate meal trays TID, or the equivalent with supplements/snacks.    Monitoring/Evaluation  Progress toward goals will be monitored and evaluated per protocol.     Nutrition will continue to follow.      Sana Costello, MS, RD, LD, [facility]   6C Pgr: [phone]

## 2021-05-26 NOTE — PROGRESS NOTES
Cardiovascular Surgery Progress Note  05/26/2021         Assessment and Plan:     Mr. Jim Willingham is a 65 year old male with history of NICM EF 20% c/b VT s/p CRT-D s/p HMII LVAD 6/19/2017 originally intended as destination therapy 2/2 obesity however with recent weight loss now candidate for transplantation. He was admitted 2/8/21 for worsening functional status and renal function concerning for worsening heart failure. He was eventually listed status 2E for transplant and received a donor offer.   Jim is now s/p orthotopic heart transplant on 5/6/21 with Dr. Ronnie Quigley. He was shocked > 2x received amio, mag and lidocaine while coming off bypass. Known lung injury with isolated air leak to right pleural that eventually resolved, CT removed 5/23.      Cardiovascular:   Hx of NICM EF 20%, VT (ICD)  Hx HeartMate II LVAD 6/19/2017  Cardiorenal syndrome   S/P orthotopic heart transplant   No arrhythmia, HD stable.  Most recent echo showed LV EF 65-70% on 5/9/21.  Basiliximab 5/10.  Terbutaline weaned to off.    ASA 81 mg, Crestor 10 mg daily.   Diuresis with Bumex 2 mg PO BID, Cards II following. Diuresing as per Cards II. Lymphedema wraps 5/18, improving.   Chest tubes: Airleak was isolated to Right pleural. Had some CP after walking off suction 5/13 with associated subcutaneous emphysema, needed portable suction while ambulating and after improvement was able to ambulate OFF suction 5/19. He was kept to -10 mmHg while in room to reduce subcutaneous emphysema from getting worse. Very slow intermittent air leak 5/19, this resolved by 5/22. R pleural removed 5/23 without complication. Stable left hemidiaphragm elevation.   TPW: removed.       Pulmonary:  Hx COPD and CECILIA on CPAP  Left hemidiaphragm elevation   Air leak from Right pleural tube (unintended injury during surgery)   Possible HAP - persistent opacity in Left retrocardiac base  - PTA Breo ellipta, Incruse ellipta, and Singulair   - Extubated POD 1 to  4 lpm via NC. Now saturating well on RA.   - Pulm toilet, IS, activity and deep breathing  - R pleural fluid Triglycerides normal on 5/17 (first day of increased drainage)  - Levaquin 750 mg every day for five days (since 5/22) switched to IV Zosyn 5/25 am due to leukocytosis.      Neurology / MSK:  Hx Depression  - PTA bupropion and amitriptyline  Post-op Status  - Neuro intact  - Acute post-operative pain well controlled with acetaminophen, PO dilaudid PRN, IV dilaudid PRN  R Hip Pain   - Had R hip pain increased over 2 days, was keeping him from raising legs into bed independently. Pain with flexion and abduction. Hip X-ray 5/16 without acute concerns, tried ice and pain has resolved.        / Renal:  WALTER on CKD stage III   - Cardiorenal syndrome improving. Most recent creatinine 1.81.    - Day before surgery weight 102.3 kg.      GI / FEN:   Hx Sylvester-en-Y gastric bypass  Non-severe Mild Protein-Calorie Malnutrition   - No Hx N/V or short gut syndrome after RNY (2003).   - Regular diet with supplements, continue bowel regimen.   - Replace electrolytes as needed, hepatic enzymes WNL.  Nausea with Emesis   - +BM 5/18 with lactulose, dosed again 5/24 per patient request. Suppository 5/25 AM with + BM but still feeling nauseated after BM. No clear obstructive signs on Abd Xray.   - Placed NG tube 5/25 with low intermittent suction. Gastrograffin challenge 5/25, contrast traveled to colon. He is feeling better 5/26 am.   - Pink Lady enema 5/26 at 3 pm.   - Consider GI/Bariatric surgery consult 5/27 if not improving.      Endocrine:  DMT2, was on Insulin PTA  Gout   Stress induced hyperglycemia initially managed on insulin drip postop, transitioned to NPH BID and sliding scale. Endocrine consulted, signed off 5/9.   - Converted to Lantus 5/14 am, continue prandial and high resistance sliding corrective scale. Lantus on HOLD with reduced intake, he is nauseated and less intake. He had been using 30 U daily. Will do  glucose checks q 4 hrs 5/26.   - PTA allopurinol.     Infectious Disease:  Stress induced leukocytosis  HAP, persistent left lower base opacity   - WBC 15.2, remains afebrile, no overt signs or symptoms of infection but suspicion remains for aspiration pneumonia. UA clean 5/15. Procalcitonin and CRP both very low 5/24.   - CT Ch/ Abd/ Pelvis 5/18 done for persistent elevated WBC; small consolidative opacity in LL base with ground glass opacities in both lung bases, ? atypical infection with some degree of atelectasis. WBC has been decreasing since, saturating well, no cough, afebrile. CXR 5/22 confirming his persistent elevated Left sandor-diaphragm. CXR 5/25 with evolving LLL opacity suggesting possible aspiration.   - Levaquin switched to Zosyn 5/25 am due to rising WBC. Sputum and UA sent 5/25. NGTD. Blood culture sent 5/26.       Hematology:   Acute blood loss anemia and thrombocytopenia  Hgb 7.2 stable, Plts WNL, no signs or symptoms of active bleeding     Anticoagulation:   - ASA only     Prophylaxis:   - Stress ulcer prophylaxis: Pantoprazole 40 mg BID for Hx RNY and nausea   - DVT prophylaxis: Subcutaneous heparin, SCD     Disposition:   - Transferred to  on 5/10   - Therapies currently recommending discharge to ARU    Discussed with CVTS Fellow as needed.  Staff surgeons have been informed of changes through both verbal and written communication.      Nash Amado PA-C  Cardiothoracic Surgery  Pager [phone]    7:23 AM   May 26, 2021        Interval History:     No overnight events. Feels better than yesterday but still not very hungry.  States pain is well managed on current regimen. Slept ok overnight.  Tolerating NPO with NG for decompression. NG has been off suction since 10 am.   He is passing flatus, no BM since Monday. No nausea or vomiting since NG in place.    Breathing well without complaints and weaned to RA per RN today.    Denies chest pain, palpitations, dizziness, syncopal symptoms,  "fevers, chills, myalgias, or sternal popping/clicking.         Physical Exam:   Blood pressure 112/73, pulse 95, temperature 97.9  F (36.6  C), temperature source Oral, resp. rate 20, height 1.727 m (5' 8\"), weight 100.4 kg (221 lb 4.8 oz), SpO2 98 %.  Vitals:    05/24/21 0307 05/25/21 0428 05/26/21 0600   Weight: 105.8 kg (233 lb 4.8 oz) 103.1 kg (227 lb 6.4 oz) 100.4 kg (221 lb 4.8 oz)      Weight; - 2 kg since surgery and trending down.   24 hr Fluid status; net loss 110 mL.   MAPs: 88 - 98     Gen: A&Ox4, NAD  Neuro: no focal deficits but looks tired  CV: RRR, normal S1 S2, no murmurs, rubs or gallops.   Pulm: CTA, soft expiratory wheezing without rhonchi, normal breathing on RA  Abd: nondistended, normal BS, soft, nontender  Ext: moderate peripheral edema, 2+ pitting  Incision: clean, dry, intact, no erythema, sternum stable  Tubes/drain sites: dressing clean and dry         Data:    Imaging:  reviewed recent imaging, no acute concerns    Gastrografin Challenge 5/25-   Impression:   By 11 hours after administration of Gastrografin, contrast is seen in the proximal descending colon.  No evidence of bowel obstruction. If desired, follow-up abdominal radiograph can be obtained to  further evaluate contrast progression to the rectum.    Labs:  BMP  Recent Labs   Lab 05/25/21  1627 05/25/21  0453 05/24/21  1724 05/24/21  0544    139 138 137   POTASSIUM 4.4 3.9 3.8 3.9   CHLORIDE 105 104 106 103   QAMAR 8.1* 8.2* 7.9* 8.2*   CO2 25 26 28 28   BUN 53* 48* 52* 59*   CR 1.94* 1.81* 1.86* 1.97*   * 108* 74 86     CBC  Recent Labs   Lab 05/25/21  0453 05/24/21  0544 05/23/21  0542 05/22/21  1206 05/22/21  0605   WBC 17.2* 14.8* 12.4*  --  13.9*   RBC 2.73* 2.74* 2.41*  --  2.41*   HGB 8.2* 8.2* 7.4* 7.4* 7.2*   HCT 27.5* 26.8* 23.7*  --  23.6*   * 98 98  --  98   MCH 30.0 29.9 30.7  --  29.9   MCHC 29.8* 30.6* 31.2*  --  30.5*   RDW 18.0* 17.6* 17.1*  --  17.0*    220 240  --  252     INR  No lab " results found in last 7 days.   Hepatic Panel  Recent Labs   Lab 05/24/21  0544 05/20/21  0803   AST 28 28   ALT 58 46   ALKPHOS 147 102   BILITOTAL 0.8 0.5   ALBUMIN 2.8* 2.8*     GLUCOSE:   Recent Labs   Lab 05/26/21  0339 05/25/21  2217 05/25/21  1641 05/25/21  1627 05/25/21  1256 05/25/21  0453 05/25/21  0450 05/25/21  0203 05/24/21  1724 05/24/21  1724 05/24/21  0544 05/24/21  0544 05/23/21  1734 05/23/21  1734 05/23/21  0542 05/23/21  0542   GLC  --   --   --  154*  --  108*  --   --   --  74  --  86  --  240*  --  148*   * 198* 164*  --  142*  --  109* 132*   < >  --    < >  --    < >  --    < >  --     < > = values in this interval not displayed.

## 2021-05-26 NOTE — PROGRESS NOTES
[facility]   Cardiology II Service / Advanced Heart Failure  Daily Progress Note        Patient: Jim Willingham  MRN: [id number]  Admission Date: 2/8/2021  Hospital Day # 107       Assessment and Plan: Jim Willingham is a 65 year old male with history of NICM EF 20% c/b VT s/p CRT-D s/p HMII LVAD 6/19/2017 originally intended as destination therapy 2/2 obesity however with recent weight loss now candidate for transplantation. He is admitted for decompensated heart failure with subsequent s/p OHT 5/6/21.    Recommendations today:   - RHC and biopsy tomorrow  - NPO at MN  - Agree with trailing clears until then  - F/u tacrolimus level  - Adding on hepatic panel, amylase, lipase  - 2 mg IV bumex x1 with blood transfusion   - CT chest/abd/pelvis   - Immunosuppression transitioned to IV given bowel issues.   - Decreased Cellcept in setting of infection to 1000 mg po BID.   - Agree with Zosyn  - Appreciate Transplant ID consult.      S/p OHT. 5/6/21 secondary to NICM. Echo 5/14/21 conistent with EF 65-70%, mildly decreased RV function, and dilated IVC. RHC 5/20/21 with mRA-11, RV-35/11, mPA-23, mPCWP-11, MICHELLE CO-6.2, and MICHELLE CI-2.7. DSA negative 5/13/21.    Immunosuppression: Simulect induction 5/6 and 5/10. Tac gtt increased to 110 mcg/hr level-6.4, goal 10-12. IV Cellcept 1000 mg po BID. Solumedrol 16 mg in AM and 12 mg in the evening.     Serostatus: CMV: D+/R+, EBV: D+/R+, Toxo D-/R-  Prophylaxis: Dapsone, Nystatin, and Valcyte.   - Continue Crestor and ASA, not tolerating po at this time.   - Repeat RHC/biopsy 5/27.  - Standing Bumex on hold in setting of inability to tolerate oral intake, will give 2 mg IV x1 with his blood transfusions today. Monitor I & O.       Leukocytosis. Chest X-ray 5/12/21 consistent with small right apical pneumo, small left pleural effusion, and bilateral opacities. CT Chest positive for left lung base consolidation vs atelectasis. Chest X-ray 5/21/21 consistent with left  "retrocardiac opacity, atelectasis vs consolidation. Levaquin initiated with concern for HCAP per CT.   - Appreciate transplant ID consult  - CT chest abdomen pelvis  - Hold off on biopsy until blood cultures have time to mature  - WBC- 15 (F17)  - Blood cx and sputum cx pending. UA negative. Chest X-ray consistent with bibasilar mixed interstitial and airspace opacities, likely atelectasis versus pulmonary edema.   - Broaden to Zosyn.     Nausea with Emesis. Dysphagia with Dyspepsia. History of Gastric bypass surgery. Abd X-ray with improved stool burden 5/24. Large BM with persistent nausea.   - CT chest/abd/pelvis as above  - Consider bariatric surgery consult.   - NG placement via fluoroscopy for gastrografin study per CVTS.   - Aggressive bowel regimen per CVTS.      Right pleural tube air leak, resolved.   - Management per CVTS.   ================================================================    Interval History/ROS: Feeling better today. Minimal nausea. Still some abdominal pain. No further vomiting today. He is passing gas. Continues to have a cough since vomitting a few days a go, non-productive. He denies fever, chills, chest pain, palpitations, diarrhea. Has not been trialed on any oral intake so far today. Feeling very fatigued.     Last 24 hr care team notes reviewed.   ROS:  4 point ROS including Respiratory, CV, GI and , other than that noted in the HPI, is negative.     Medications: Reviewed in EPIC.     Physical Exam:   BP (!) (P) 138/91 (BP Location: Left arm)   Pulse (P) 102   Temp (P) 98.3  F (36.8  C) (Oral)   Resp (P) 22   Ht 1.727 m (5' 8\")   Wt 100.4 kg (221 lb 4.8 oz)   SpO2 (P) 95%   BMI 33.65 kg/m    GENERAL: Appears alert and interacting appropriatly.  HEENT: Eye symmetrical and free of discharge bilaterally. Mucous membranes moist and without lesions.  NECK: Supple and without lymphadenopathy. JVD 8 cm   CV: Regular tachycardic. S1S2 present without murmur, rub, or gallop. "   RESPIRATORY: Respirations regular, even, and unlabored. Lungs CTA throughout.   GI: Soft and mildly distended with normoactive bowel sounds present in all quadrants. No rebound or guarding. Tenderness throughout. No organomegaly.   EXTREMITIES: +1 bilateral LE peripheral edema. 2+ bilateral pedal pulses.   NEUROLOGIC: Alert and interacting appropriatly. No focal deficits.   MUSCULOSKELETAL: No joint swelling or tenderness.   SKIN: No jaundice. No rashes or lesions.     Data:  CMP  Recent Labs   Lab 05/26/21  1340 05/26/21  0705 05/25/21  1627 05/25/21  0453 05/24/21  1724 05/24/21  0544 05/20/21  0803 05/20/21  0803   NA  --  138 139 139 138 137   < > 130*   POTASSIUM  --  4.3 4.4 3.9 3.8 3.9   < > 4.2   CHLORIDE  --  106 105 104 106 103   < > 98   CO2  --  27 25 26 28 28   < > 29   ANIONGAP  --  5 9 8 4 6   < > 4   GLC  --  234* 154* 108* 74 86   < > 114*   BUN  --  50* 53* 48* 52* 59*   < > 64*   CR  --  1.76* 1.94* 1.81* 1.86* 1.97*   < > 2.03*   GFRESTIMATED  --  40* 36* 39* 37* 35*   < > 34*   GFRESTBLACK  --  46* 41* 45* 43* 40*   < > 39*   QAMAR  --  8.3* 8.1* 8.2* 7.9* 8.2*   < > 7.9*   MAG  --  2.2 2.0 2.0 1.9 2.0   < > 2.1   PROTTOTAL 4.9*  --   --   --   --  5.5*  --  5.3*   ALBUMIN 2.2*  --   --   --   --  2.8*  --  2.8*   BILITOTAL 0.6  --   --   --   --  0.8  --  0.5   ALKPHOS 123  --   --   --   --  147  --  102   AST 26  --   --   --   --  28  --  28   ALT 52  --   --   --   --  58  --  46    < > = values in this interval not displayed.     CBC  Recent Labs   Lab 05/26/21  1340 05/26/21  0705 05/25/21  0453 05/24/21  0544 05/23/21  0542   WBC  --  15.2* 17.2* 14.8* 12.4*   RBC  --  2.40* 2.73* 2.74* 2.41*   HGB 6.7* 7.2* 8.2* 8.2* 7.4*   HCT  --  23.9* 27.5* 26.8* 23.7*   MCV  --  100 101* 98 98   MCH  --  30.0 30.0 29.9 30.7   MCHC  --  30.1* 29.8* 30.6* 31.2*   RDW  --  17.4* 18.0* 17.6* 17.1*   PLT  --  153 187 220 240     Patient seen and discussed with Dr. Montgomery.     Didi Butler,  MICHEL  [facility]

## 2021-05-26 NOTE — PROGRESS NOTES
"SPIRITUAL HEALTH SERVICES  SPIRITUAL ASSESSMENT Progress Note  [facility] 6C     Brief supportive visit with pt, who said \"yesterday was a really difficult day, but today is better...\" Pt not up for visiting today, but welcomed prayer.    PLAN: continue to follow while pt on unit.    Parviz Early) Jg Smith M.Div., [facility]  Staff   Pager [phone]      * [facility] remains available 24/7 for emergent requests/referrals, either by having the switchboard page the on-call  or by entering an ASAP/STAT consult in Epic (this will also page the on-call ). Routine Epic consults receive an initial response within 24 hours.*      "

## 2021-05-26 NOTE — CODE/RAPID RESPONSE
"Rapid Response Team Note    Assessment   In assessment a rapid response was called on Jim Willingham due to hyperlactatemia, w/ LA 2.5 This presentation is likely multifactorial s/t to infection vs mild hypovolemia.    Plan   -  Patient on Zosyn for aspiration PNA since 21- continue  -  Discussed w/ CVTS who recommend 2 U PRBC w/ Bumex  -  Will repeat LA at 2300 for trend as patient w/o fever, sob, cough, abdominal pain or dysuria  -  The CVTS primary team was able to be reached and they are in agreement with the above plan.  -  Disposition: The patient will remain on the current unit. We will continue to monitor this patient closely.  -  Reassessment and plan follow-up will be performed by the primary team      Tanvi Atkinson PA-C  [facility] Job Code Contact #[phone]    Hospital Course   Brief Summary of events leading to rapid response:   Jim Willingham is a 64 y/o M w/ PMH of COPD, gout,  CECILIA, h/o gastric bypass, CKDIII, DMII,  NICM w/ EF 20% c/b VT s/p CRT-D s/p HMII LVAD () who as admitted w/ decompensated HF s/p OHT 21. RRT called for Hyperlactatemia w/ LA 2.5 in setting of anemia, recent transplant and suspected aspiration PNA (on zosyn). Patient reports that his RESENDIZ as at [facility], no CP, cough, fever, chills, abdominal pain, dysuria.  He does endorse abdominal \"fullness\". We discussed POC as outlined above and patient agreeable.     Admission Diagnosis:   Decompensated heart failure (H) [I50.9]     Physical Exam   Temp: 97.4  F (36.3  C) Temp  Min: 97.3  F (36.3  C)  Max: 99.3  F (37.4  C)  Resp: 22 Resp  Min: 18  Max: 22  SpO2: 96 % SpO2  Min: 93 %  Max: 100 %  Pulse: 89 Pulse  Min: 86  Max: 108    No data recorded  BP: 112/62 Systolic (24hrs), Av , Min:104 , Max:138   Diastolic (24hrs), Av, Min:62, Max:91     I/Os: I/O last 3 completed shifts:  In: 835 [P.O.:50; I.V.:475; NG/GT:310]  Out: 1200 [Urine:1200]     Exam:   General: Pleasant male sitting up in bed. " Conversant, NAD.  Mental Status: AAOx4.  CV: RRR, no M/R/G  Resp: breathing non-labored on RA  GI: BS +, distended, though soft. No rebound or guarding.   Neuro: Moves all extremities     Significant Results and Procedures   Lactic Acid:   Recent Labs   Lab Test 05/26/21  1705 05/25/21  0834 05/15/21  2146 05/08/21  0848 05/08/21  0848 05/08/21  0340 05/07/21  2226   LACT  --   --   --   --  1.0 1.2 1.6   LACTS 2.5* 1.0 1.9   < >  --   --   --     < > = values in this interval not displayed.     CBC:   Recent Labs   Lab Test 05/26/21  1705 05/26/21  1340 05/26/21  0705 05/25/21  0453 05/24/21  0544   WBC  --   --  15.2* 17.2* 14.8*   HGB 7.2* 6.7* 7.2* 8.2* 8.2*   HCT 24.0*  --  23.9* 27.5* 26.8*   PLT  --   --  153 187 220        Sepsis Evaluation   The patient is known to have an infection.  Jim Willingham meets SIRS criteria but does NOT have a lactate >2 or other evidence of acute organ damage.  These vital sign, lab and physical exam findings constitute a diagnosis of SEPSIS.    Sepsis Time-Zero (time Sepsis diagnosis confirmed):   05/26/21    Anti-infectives (From now, onward)    Start     Dose/Rate Route Frequency Ordered Stop    05/25/21 1400  valGANciclovir (VALCYTE) solution 450 mg      450 mg Oral or NG Tube DAILY 05/25/21 1354      05/25/21 0800  piperacillin-tazobactam (ZOSYN) 3.375 g vial to attach to  mL bag      3.375 g  over 30 Minutes Intravenous EVERY 6 HOURS 05/25/21 0746      05/22/21 0800  dapsone (ACZONE) tablet 100 mg      100 mg Oral DAILY 05/21/21 0811          Current antibiotic coverage is appropriate for source of infection.

## 2021-05-27 ENCOUNTER — APPOINTMENT (OUTPATIENT)
Dept: OCCUPATIONAL THERAPY | Facility: CLINIC | Age: 66
DRG: 001 | End: 2021-05-27
Attending: INTERNAL MEDICINE
Payer: COMMERCIAL

## 2021-05-27 ENCOUNTER — APPOINTMENT (OUTPATIENT)
Dept: PHYSICAL THERAPY | Facility: CLINIC | Age: 66
DRG: 001 | End: 2021-05-27
Attending: INTERNAL MEDICINE
Payer: COMMERCIAL

## 2021-05-27 LAB
1,3 BETA GLUCAN SER-MCNC: <31 PG/ML
ANION GAP SERPL CALCULATED.3IONS-SCNC: 5 MMOL/L (ref 3–14)
ANION GAP SERPL CALCULATED.3IONS-SCNC: 5 MMOL/L (ref 3–14)
B-D GLUCAN INTERPRETATION (1,3): NEGATIVE
BACTERIA SPEC CULT: NORMAL
BLD PROD TYP BPU: NORMAL
BLD PROD TYP BPU: NORMAL
BLD UNIT ID BPU: 0
BLD UNIT ID BPU: 0
BLOOD PRODUCT CODE: NORMAL
BLOOD PRODUCT CODE: NORMAL
BPU ID: NORMAL
BPU ID: NORMAL
BUN SERPL-MCNC: 42 MG/DL (ref 7–30)
BUN SERPL-MCNC: 44 MG/DL (ref 7–30)
CALCIUM SERPL-MCNC: 8.2 MG/DL (ref 8.5–10.1)
CALCIUM SERPL-MCNC: 8.4 MG/DL (ref 8.5–10.1)
CHLORIDE SERPL-SCNC: 102 MMOL/L (ref 94–109)
CHLORIDE SERPL-SCNC: 103 MMOL/L (ref 94–109)
CO2 SERPL-SCNC: 27 MMOL/L (ref 20–32)
CO2 SERPL-SCNC: 28 MMOL/L (ref 20–32)
CREAT SERPL-MCNC: 1.59 MG/DL (ref 0.66–1.25)
CREAT SERPL-MCNC: 1.76 MG/DL (ref 0.66–1.25)
CRP SERPL-MCNC: 140 MG/L (ref 0–8)
ERYTHROCYTE [DISTWIDTH] IN BLOOD BY AUTOMATED COUNT: 16.8 % (ref 10–15)
GALACTOMANNAN AG SERPL QL IA: NEGATIVE
GALACTOMANNAN AG SERPL-ACNC: 0.08
GFR SERPL CREATININE-BSD FRML MDRD: 40 ML/MIN/{1.73_M2}
GFR SERPL CREATININE-BSD FRML MDRD: 45 ML/MIN/{1.73_M2}
GLUCOSE BLDC GLUCOMTR-MCNC: 216 MG/DL (ref 70–99)
GLUCOSE BLDC GLUCOMTR-MCNC: 220 MG/DL (ref 70–99)
GLUCOSE BLDC GLUCOMTR-MCNC: 245 MG/DL (ref 70–99)
GLUCOSE BLDC GLUCOMTR-MCNC: 255 MG/DL (ref 70–99)
GLUCOSE BLDC GLUCOMTR-MCNC: 268 MG/DL (ref 70–99)
GLUCOSE BLDC GLUCOMTR-MCNC: 338 MG/DL (ref 70–99)
GLUCOSE SERPL-MCNC: 264 MG/DL (ref 70–99)
GLUCOSE SERPL-MCNC: 294 MG/DL (ref 70–99)
HCT VFR BLD AUTO: 26.9 % (ref 40–53)
HGB BLD-MCNC: 8.3 G/DL (ref 13.3–17.7)
MAGNESIUM SERPL-MCNC: 1.9 MG/DL (ref 1.6–2.3)
MAGNESIUM SERPL-MCNC: 1.9 MG/DL (ref 1.6–2.3)
MCH RBC QN AUTO: 29.5 PG (ref 26.5–33)
MCHC RBC AUTO-ENTMCNC: 30.9 G/DL (ref 31.5–36.5)
MCV RBC AUTO: 96 FL (ref 78–100)
PLATELET # BLD AUTO: 128 10E9/L (ref 150–450)
POTASSIUM SERPL-SCNC: 3.7 MMOL/L (ref 3.4–5.3)
POTASSIUM SERPL-SCNC: 3.9 MMOL/L (ref 3.4–5.3)
RBC # BLD AUTO: 2.81 10E12/L (ref 4.4–5.9)
SODIUM SERPL-SCNC: 135 MMOL/L (ref 133–144)
SODIUM SERPL-SCNC: 136 MMOL/L (ref 133–144)
SPECIMEN SOURCE: NORMAL
TACROLIMUS BLD-MCNC: 10 UG/L (ref 5–15)
TME LAST DOSE: NORMAL H
TRANSFUSION STATUS PATIENT QL: NORMAL
WBC # BLD AUTO: 11.7 10E9/L (ref 4–11)

## 2021-05-27 PROCEDURE — 250N000013 HC RX MED GY IP 250 OP 250 PS 637: Performed by: SURGERY

## 2021-05-27 PROCEDURE — 97535 SELF CARE MNGMENT TRAINING: CPT | Mod: GO

## 2021-05-27 PROCEDURE — 250N000013 HC RX MED GY IP 250 OP 250 PS 637: Performed by: STUDENT IN AN ORGANIZED HEALTH CARE EDUCATION/TRAINING PROGRAM

## 2021-05-27 PROCEDURE — 250N000013 HC RX MED GY IP 250 OP 250 PS 637: Performed by: NURSE PRACTITIONER

## 2021-05-27 PROCEDURE — 250N000011 HC RX IP 250 OP 636: Performed by: PHYSICIAN ASSISTANT

## 2021-05-27 PROCEDURE — 36415 COLL VENOUS BLD VENIPUNCTURE: CPT | Performed by: PHYSICIAN ASSISTANT

## 2021-05-27 PROCEDURE — 250N000011 HC RX IP 250 OP 636: Performed by: INTERNAL MEDICINE

## 2021-05-27 PROCEDURE — 36592 COLLECT BLOOD FROM PICC: CPT | Performed by: PHYSICIAN ASSISTANT

## 2021-05-27 PROCEDURE — 99233 SBSQ HOSP IP/OBS HIGH 50: CPT | Mod: GC | Performed by: INTERNAL MEDICINE

## 2021-05-27 PROCEDURE — 02BM3ZX EXCISION OF VENTRICULAR SEPTUM, PERCUTANEOUS APPROACH, DIAGNOSTIC: ICD-10-PCS | Performed by: INTERNAL MEDICINE

## 2021-05-27 PROCEDURE — 80048 BASIC METABOLIC PNL TOTAL CA: CPT | Performed by: PHYSICIAN ASSISTANT

## 2021-05-27 PROCEDURE — 88307 TISSUE EXAM BY PATHOLOGIST: CPT | Mod: TC | Performed by: INTERNAL MEDICINE

## 2021-05-27 PROCEDURE — 214N000001 HC R&B CCU UMMC

## 2021-05-27 PROCEDURE — 99232 SBSQ HOSP IP/OBS MODERATE 35: CPT | Mod: 25 | Performed by: PHYSICIAN ASSISTANT

## 2021-05-27 PROCEDURE — 4A023N6 MEASUREMENT OF CARDIAC SAMPLING AND PRESSURE, RIGHT HEART, PERCUTANEOUS APPROACH: ICD-10-PCS | Performed by: INTERNAL MEDICINE

## 2021-05-27 PROCEDURE — 88350 IMFLUOR EA ADDL 1ANTB STN PX: CPT | Mod: TC | Performed by: INTERNAL MEDICINE

## 2021-05-27 PROCEDURE — 250N000011 HC RX IP 250 OP 636: Performed by: NURSE PRACTITIONER

## 2021-05-27 PROCEDURE — 258N000003 HC RX IP 258 OP 636: Performed by: NURSE PRACTITIONER

## 2021-05-27 PROCEDURE — 97530 THERAPEUTIC ACTIVITIES: CPT | Mod: GP

## 2021-05-27 PROCEDURE — 250N000009 HC RX 250: Performed by: NURSE PRACTITIONER

## 2021-05-27 PROCEDURE — 94640 AIRWAY INHALATION TREATMENT: CPT

## 2021-05-27 PROCEDURE — 999N000157 HC STATISTIC RCP TIME EA 10 MIN

## 2021-05-27 PROCEDURE — 999N001017 HC STATISTIC GLUCOSE BY METER IP

## 2021-05-27 PROCEDURE — P9016 RBC LEUKOCYTES REDUCED: HCPCS | Performed by: PHYSICIAN ASSISTANT

## 2021-05-27 PROCEDURE — C1894 INTRO/SHEATH, NON-LASER: HCPCS | Performed by: INTERNAL MEDICINE

## 2021-05-27 PROCEDURE — 250N000009 HC RX 250: Performed by: PHYSICIAN ASSISTANT

## 2021-05-27 PROCEDURE — 88346 IMFLUOR 1ST 1ANTB STAIN PX: CPT | Mod: TC | Performed by: INTERNAL MEDICINE

## 2021-05-27 PROCEDURE — 250N000013 HC RX MED GY IP 250 OP 250 PS 637

## 2021-05-27 PROCEDURE — 93505 ENDOMYOCARDIAL BIOPSY: CPT | Performed by: INTERNAL MEDICINE

## 2021-05-27 PROCEDURE — 250N000009 HC RX 250: Performed by: STUDENT IN AN ORGANIZED HEALTH CARE EDUCATION/TRAINING PROGRAM

## 2021-05-27 PROCEDURE — 94640 AIRWAY INHALATION TREATMENT: CPT | Mod: 76

## 2021-05-27 PROCEDURE — 250N000009 HC RX 250: Performed by: INTERNAL MEDICINE

## 2021-05-27 PROCEDURE — 88307 TISSUE EXAM BY PATHOLOGIST: CPT | Mod: 26 | Performed by: PATHOLOGY

## 2021-05-27 PROCEDURE — 272N000001 HC OR GENERAL SUPPLY STERILE: Performed by: INTERNAL MEDICINE

## 2021-05-27 PROCEDURE — 88346 IMFLUOR 1ST 1ANTB STAIN PX: CPT | Mod: 26 | Performed by: PATHOLOGY

## 2021-05-27 PROCEDURE — 86140 C-REACTIVE PROTEIN: CPT | Performed by: PHYSICIAN ASSISTANT

## 2021-05-27 PROCEDURE — 99221 1ST HOSP IP/OBS SF/LOW 40: CPT | Mod: 24 | Performed by: INTERNAL MEDICINE

## 2021-05-27 PROCEDURE — 250N000013 HC RX MED GY IP 250 OP 250 PS 637: Performed by: PHYSICIAN ASSISTANT

## 2021-05-27 PROCEDURE — 85027 COMPLETE CBC AUTOMATED: CPT | Performed by: PHYSICIAN ASSISTANT

## 2021-05-27 PROCEDURE — 83735 ASSAY OF MAGNESIUM: CPT | Performed by: PHYSICIAN ASSISTANT

## 2021-05-27 PROCEDURE — 250N000011 HC RX IP 250 OP 636

## 2021-05-27 PROCEDURE — 80197 ASSAY OF TACROLIMUS: CPT | Performed by: PHYSICIAN ASSISTANT

## 2021-05-27 RX ORDER — HYDRALAZINE HYDROCHLORIDE 20 MG/ML
10 INJECTION INTRAMUSCULAR; INTRAVENOUS ONCE
Status: COMPLETED | OUTPATIENT
Start: 2021-05-27 | End: 2021-05-27

## 2021-05-27 RX ORDER — GABAPENTIN 300 MG/1
300 CAPSULE ORAL DAILY
Status: DISCONTINUED | OUTPATIENT
Start: 2021-05-28 | End: 2021-05-31 | Stop reason: HOSPADM

## 2021-05-27 RX ORDER — LABETALOL HYDROCHLORIDE 5 MG/ML
20 INJECTION, SOLUTION INTRAVENOUS ONCE
Status: COMPLETED | OUTPATIENT
Start: 2021-05-28 | End: 2021-05-28

## 2021-05-27 RX ORDER — VALGANCICLOVIR 450 MG/1
450 TABLET, FILM COATED ORAL DAILY
Status: DISCONTINUED | OUTPATIENT
Start: 2021-05-28 | End: 2021-05-31 | Stop reason: HOSPADM

## 2021-05-27 RX ORDER — BUMETANIDE 0.25 MG/ML
4 INJECTION INTRAMUSCULAR; INTRAVENOUS ONCE
Status: COMPLETED | OUTPATIENT
Start: 2021-05-27 | End: 2021-05-27

## 2021-05-27 RX ORDER — BUMETANIDE 0.25 MG/ML
2 INJECTION INTRAMUSCULAR; INTRAVENOUS ONCE
Status: DISCONTINUED | OUTPATIENT
Start: 2021-05-27 | End: 2021-05-27

## 2021-05-27 RX ORDER — PANTOPRAZOLE SODIUM 40 MG/1
40 TABLET, DELAYED RELEASE ORAL 2 TIMES DAILY
Status: DISCONTINUED | OUTPATIENT
Start: 2021-05-27 | End: 2021-05-31 | Stop reason: HOSPADM

## 2021-05-27 RX ORDER — ACETAMINOPHEN 325 MG/1
975 TABLET ORAL EVERY 8 HOURS
Status: DISCONTINUED | OUTPATIENT
Start: 2021-05-28 | End: 2021-05-31 | Stop reason: HOSPADM

## 2021-05-27 RX ORDER — GABAPENTIN 300 MG/1
600 CAPSULE ORAL 2 TIMES DAILY
Status: DISCONTINUED | OUTPATIENT
Start: 2021-05-28 | End: 2021-05-31 | Stop reason: HOSPADM

## 2021-05-27 RX ORDER — ALBUTEROL SULFATE 0.83 MG/ML
2.5 SOLUTION RESPIRATORY (INHALATION)
Status: DISCONTINUED | OUTPATIENT
Start: 2021-05-27 | End: 2021-05-27

## 2021-05-27 RX ORDER — ALBUTEROL SULFATE 0.83 MG/ML
2.5 SOLUTION RESPIRATORY (INHALATION)
Status: DISCONTINUED | OUTPATIENT
Start: 2021-05-27 | End: 2021-05-31 | Stop reason: HOSPADM

## 2021-05-27 RX ADMIN — METHYLPREDNISOLONE SODIUM SUCCINATE 12 MG: 40 INJECTION, POWDER, FOR SOLUTION INTRAMUSCULAR; INTRAVENOUS at 21:29

## 2021-05-27 RX ADMIN — NYSTATIN 1000000 UNITS: 500000 SUSPENSION ORAL at 16:49

## 2021-05-27 RX ADMIN — BUMETANIDE 4 MG: 0.25 INJECTION, SOLUTION INTRAMUSCULAR; INTRAVENOUS at 18:49

## 2021-05-27 RX ADMIN — SODIUM CHLORIDE, PRESERVATIVE FREE 5 ML: 5 INJECTION INTRAVENOUS at 18:39

## 2021-05-27 RX ADMIN — ACETAMINOPHEN ORAL SOLUTION 975 MG: 325 SOLUTION ORAL at 08:00

## 2021-05-27 RX ADMIN — Medication 50 MG: at 21:10

## 2021-05-27 RX ADMIN — ALLOPURINOL 300 MG: 300 TABLET ORAL at 08:02

## 2021-05-27 RX ADMIN — ALBUTEROL SULFATE 2 PUFF: 90 INHALANT RESPIRATORY (INHALATION) at 05:57

## 2021-05-27 RX ADMIN — BUPROPION HYDROCHLORIDE 75 MG: 75 TABLET, FILM COATED ORAL at 08:02

## 2021-05-27 RX ADMIN — MYCOPHENOLATE MOFETIL 1000 MG: 500 INJECTION, POWDER, LYOPHILIZED, FOR SOLUTION INTRAVENOUS at 11:02

## 2021-05-27 RX ADMIN — BUPROPION HYDROCHLORIDE 75 MG: 75 TABLET, FILM COATED ORAL at 15:01

## 2021-05-27 RX ADMIN — SENNOSIDES AND DOCUSATE SODIUM 1 TABLET: 8.6; 5 TABLET ORAL at 21:12

## 2021-05-27 RX ADMIN — METHYLPREDNISOLONE SODIUM SUCCINATE 16 MG: 40 INJECTION, POWDER, FOR SOLUTION INTRAMUSCULAR; INTRAVENOUS at 08:41

## 2021-05-27 RX ADMIN — PIPERACILLIN AND TAZOBACTAM 3.38 G: 3; .375 INJECTION, POWDER, LYOPHILIZED, FOR SOLUTION INTRAVENOUS at 09:36

## 2021-05-27 RX ADMIN — GABAPENTIN 600 MG: 250 SUSPENSION ORAL at 15:01

## 2021-05-27 RX ADMIN — MONTELUKAST 10 MG: 10 TABLET, FILM COATED ORAL at 21:39

## 2021-05-27 RX ADMIN — VALGANCICLOVIR HYDROCHLORIDE 450 MG: 50 POWDER, FOR SOLUTION ORAL at 08:00

## 2021-05-27 RX ADMIN — NYSTATIN 1000000 UNITS: 500000 SUSPENSION ORAL at 08:01

## 2021-05-27 RX ADMIN — HEPARIN SODIUM 5000 UNITS: 5000 INJECTION, SOLUTION INTRAVENOUS; SUBCUTANEOUS at 00:51

## 2021-05-27 RX ADMIN — PIPERACILLIN AND TAZOBACTAM 3.38 G: 3; .375 INJECTION, POWDER, LYOPHILIZED, FOR SOLUTION INTRAVENOUS at 15:33

## 2021-05-27 RX ADMIN — ASPIRIN 81 MG CHEWABLE TABLET 81 MG: 81 TABLET CHEWABLE at 08:02

## 2021-05-27 RX ADMIN — PIPERACILLIN AND TAZOBACTAM 3.38 G: 3; .375 INJECTION, POWDER, LYOPHILIZED, FOR SOLUTION INTRAVENOUS at 21:08

## 2021-05-27 RX ADMIN — PIPERACILLIN AND TAZOBACTAM 3.38 G: 3; .375 INJECTION, POWDER, LYOPHILIZED, FOR SOLUTION INTRAVENOUS at 03:53

## 2021-05-27 RX ADMIN — Medication 5 ML: at 15:34

## 2021-05-27 RX ADMIN — Medication 1 TABLET: at 21:10

## 2021-05-27 RX ADMIN — HEPARIN SODIUM 5000 UNITS: 5000 INJECTION, SOLUTION INTRAVENOUS; SUBCUTANEOUS at 08:02

## 2021-05-27 RX ADMIN — ACETAMINOPHEN 975 MG: 325 TABLET, FILM COATED ORAL at 23:44

## 2021-05-27 RX ADMIN — ACETAMINOPHEN ORAL SOLUTION 975 MG: 325 SOLUTION ORAL at 16:49

## 2021-05-27 RX ADMIN — CYCLOBENZAPRINE 5 MG: 5 TABLET, FILM COATED ORAL at 23:44

## 2021-05-27 RX ADMIN — PANTOPRAZOLE SODIUM 40 MG: 40 TABLET, DELAYED RELEASE ORAL at 21:10

## 2021-05-27 RX ADMIN — FLUTICASONE FUROATE AND VILANTEROL TRIFENATATE 1 PUFF: 100; 25 POWDER RESPIRATORY (INHALATION) at 08:06

## 2021-05-27 RX ADMIN — NYSTATIN 1000000 UNITS: 500000 SUSPENSION ORAL at 12:55

## 2021-05-27 RX ADMIN — GABAPENTIN 600 MG: 250 SUSPENSION ORAL at 21:10

## 2021-05-27 RX ADMIN — Medication 10 MG: at 23:44

## 2021-05-27 RX ADMIN — NYSTATIN 1000000 UNITS: 500000 SUSPENSION ORAL at 21:10

## 2021-05-27 RX ADMIN — HYDRALAZINE HYDROCHLORIDE 10 MG: 20 INJECTION INTRAMUSCULAR; INTRAVENOUS at 00:27

## 2021-05-27 RX ADMIN — UMECLIDINIUM 1 PUFF: 62.5 AEROSOL, POWDER ORAL at 08:06

## 2021-05-27 RX ADMIN — Medication 1 TABLET: at 08:01

## 2021-05-27 RX ADMIN — DAPSONE 100 MG: 25 TABLET ORAL at 08:01

## 2021-05-27 RX ADMIN — Medication 37.5 MG: at 21:10

## 2021-05-27 RX ADMIN — HYDROMORPHONE HYDROCHLORIDE 0.2 MG: 0.2 INJECTION, SOLUTION INTRAMUSCULAR; INTRAVENOUS; SUBCUTANEOUS at 08:23

## 2021-05-27 RX ADMIN — TACROLIMUS 110 MCG/HR: 5 INJECTION, SOLUTION INTRAVENOUS at 21:11

## 2021-05-27 RX ADMIN — MYCOPHENOLATE MOFETIL 1000 MG: 500 INJECTION, POWDER, LYOPHILIZED, FOR SOLUTION INTRAVENOUS at 21:41

## 2021-05-27 RX ADMIN — HEPARIN SODIUM 5000 UNITS: 5000 INJECTION, SOLUTION INTRAVENOUS; SUBCUTANEOUS at 23:44

## 2021-05-27 RX ADMIN — ALBUTEROL SULFATE 2.5 MG: 2.5 SOLUTION RESPIRATORY (INHALATION) at 16:27

## 2021-05-27 RX ADMIN — PANTOPRAZOLE SODIUM 40 MG: 40 TABLET, DELAYED RELEASE ORAL at 07:59

## 2021-05-27 RX ADMIN — ROSUVASTATIN CALCIUM 10 MG: 10 TABLET, FILM COATED ORAL at 08:02

## 2021-05-27 RX ADMIN — GABAPENTIN 300 MG: 250 SUSPENSION ORAL at 07:59

## 2021-05-27 RX ADMIN — HEPARIN SODIUM 5000 UNITS: 5000 INJECTION, SOLUTION INTRAVENOUS; SUBCUTANEOUS at 18:50

## 2021-05-27 RX ADMIN — ALBUTEROL SULFATE 2.5 MG: 2.5 SOLUTION RESPIRATORY (INHALATION) at 21:15

## 2021-05-27 RX ADMIN — PANTOPRAZOLE SODIUM 40 MG: 40 TABLET, DELAYED RELEASE ORAL at 18:46

## 2021-05-27 ASSESSMENT — ACTIVITIES OF DAILY LIVING (ADL)
ADLS_ACUITY_SCORE: 14

## 2021-05-27 ASSESSMENT — MIFFLIN-ST. JEOR: SCORE: 1797.35

## 2021-05-27 NOTE — PROGRESS NOTES
[facility]  Transplant Infectious Disease Progress Note     Patient:  Jim Willingham, Date of birth 1955, Medical record number [id number]  Date of Visit:  05/27/2021         Assessment and Recommendations:   Recommendations:     Continue zosyn for now.  Pending clinical course, will be able to narrow.  Agree with pulmonary consult for good chest therapy and pulmonary toilet  Follow up results of below tests ordered  - AFB blood culture  - Fungitell  - Galactomannan aspergillus Ag  - Histoplasma urine Ag    Thank you very much for this consultation. Transplant Infectious Disease will continue to follow with you.     Assessment:     Jmi Willingham is a 64 yo M who underwent orthotopic heart transplantation 5/6/21, previously had a HeartMate II LVAD for non ischemic cardiomyopathy.  Transplant ID has been consulted for potential aspiration vs HCAP pneumonia.     Patient has had persistent leukocytosis since transplant, largely due to steroids.  In the last several days his WBC has been trending up.  He has had chest imaging that shows a slightly elevated left sided hemidiaphragm with some bilateral opacities and small bilateral pleural effusions.  Notable when right heart catheterizations have been performed he has been intravascularly euvolemic but has some third space edema from hypoalbuminemia.  A chest CT from 5/18 showed Consolidative opacity in the left lung base with groundglassopacities in both lung bases concerning for infection including atypical infection such as aspiration.  Chest CT 5/26 showing organizing pneumonia with increase bibasilar consolidation     For time being, can continue empiric antibiotics with zosyn for potential hospital acquired pneumonia, which was transitioned from levofloxacin.  We will workup additional fungal issues as well, including aspergillus, histoplasma, cryptococcus, with a fungitell.  Will also add on AFB blood cultures. Legionella urine and  cryptococcal Ag was negative     Immunosuppression - currently on tacrolimus (goal 10-12) and cellcept 1500mg BID in addition to prolonged steroid taper (currently 20 AM, 15 PM).  He did receive basilixumab protocol     Infectious Disease issues include:  - Serostatus: CMV: D+/R+, EBV: D+/R+, Toxo D-/R-  - QTc interval: 449  - Pneumocystis prophylaxis: Dapsone 100   - Viral serostatus & prophylaxis: Valcyte 450 mg  - Fungal prophylaxis: Nystatin  - Isolation status: Good hand hygiene.     Discussed with attending Dr. Rutherford.     Riaz Montaño MD   Pager [phone]         Interval History:       Transplants:  5/6/2021 (Heart), Postoperative day:  21.  Coordinator Yolette Rueda    Patient feels much improved after NG decompression.  Breathing better as well.  Had a clear liquid diet that he tolerated well.         Current Medications & Allergies:       acetaminophen  975 mg Oral or Feeding Tube Q8H     albuterol  2.5 mg Nebulization Q6H     allopurinol  300 mg Oral Daily     amitriptyline  37.5 mg Oral At Bedtime     aspirin  81 mg Oral Daily     buPROPion  75 mg Oral or Feeding Tube BID    And     buPROPion  50 mg Oral or Feeding Tube QPM     calcium citrate-vitamin D  1 tablet Oral BID     cyanocobalamin  1,000 mcg Intramuscular Q30 Days     dapsone  100 mg Oral Daily     fluticasone-vilanterol  1 puff Inhalation Daily     gabapentin  300 mg Oral or Feeding Tube Daily     gabapentin  600 mg Oral or Feeding Tube BID     heparin ANTICOAGULANT  5,000 Units Subcutaneous Q8H     heparin lock flush  5-10 mL Intracatheter Q24H     insulin aspart  1-10 Units Subcutaneous TID AC     insulin aspart  1-7 Units Subcutaneous At Bedtime     insulin aspart   Subcutaneous TID w/meals     [Held by provider] insulin glargine  30 Units Subcutaneous QAM AC     lidocaine  2 patch Transdermal Q24H     lidocaine   Transdermal Q8H     methylPREDNISolone  12 mg Intravenous QPM     methylPREDNISolone  16 mg Intravenous QAM      montelukast  10 mg Oral At Bedtime     mycophenolate mofetil  1,000 mg Intravenous Q12H ELI     nystatin  1,000,000 Units Swish & Swallow 4x Daily     pantoprazole  40 mg Oral or Feeding Tube BID AC     piperacillin-tazobactam  3.375 g Intravenous Q6H     polyethylene glycol  17 g Oral BID     rosuvastatin  10 mg Oral Daily     scopolamine  1 patch Transdermal Q72H    And     scopolamine   Transdermal Q8H     senna-docusate  1 tablet Oral BID     sodium chloride (PF)  3 mL Intracatheter Q8H     umeclidinium  1 puff Inhalation Daily     valGANciclovir  450 mg Oral or NG Tube Daily       Infusions/Drips:    dextrose       - MEDICATION INSTRUCTIONS -       ACE/ARB/ARNI NOT PRESCRIBED       tacrolimus (Prograf) infusion ADULT 110 mcg/hr (05/27/21 0804)       Allergies   Allergen Reactions     Beer Shortness Of Breath     Grass Shortness Of Breath     Ace Inhibitors Cough     Dust Mites Other (See Comments)     Asthma     Mold Other (See Comments)     Asthma     Penicillins Other (See Comments)     Unknown - childhood exposure    Tolerated Zosyn 9/18-9/20/2020     Sulfa Drugs Other (See Comments) and Unknown     Unknown childhood reaction            Physical Exam:   Vitals were reviewed.  All vitals stable.  Patient Vitals for the past 24 hrs:   BP Temp Temp src Pulse Resp SpO2 Weight   05/27/21 1127 121/81 98.4  F (36.9  C) Oral 104 16 95 % --   05/27/21 0755 135/84 97.7  F (36.5  C) Oral 107 18 94 % --   05/27/21 0600 -- -- -- -- -- -- 102.8 kg (226 lb 9.6 oz)   05/27/21 0400 138/83 -- -- 101 18 95 % --   05/27/21 0300 (!) 143/99 97.8  F (36.6  C) Oral 103 18 96 % --   05/27/21 0230 131/85 -- -- 106 18 95 % --   05/27/21 0200 131/85 -- -- 104 18 96 % --   05/27/21 0130 (!) 149/88 98.1  F (36.7  C) Oral 103 18 95 % --   05/27/21 0100 138/83 -- -- 100 18 97 % --   05/27/21 0057 (!) 154/91 -- -- 100 18 96 % --   05/27/21 0047 (!) 151/89 97.7  F (36.5  C) Oral 99 -- 98 % --   05/27/21 0036 -- 97.7  F (36.5  C) Oral -- 18  96 % --   05/27/21 0032 (!) 155/91 -- -- -- -- -- --   05/27/21 0015 (!) 164/104 -- -- 101 -- 95 % --   05/27/21 0009 (!) 162/93 -- -- 102 -- 95 % --   05/26/21 2115 137/81 -- -- 97 -- -- --   05/26/21 2102 (!) 150/90 97.8  F (36.6  C) Oral 99 18 96 % --   05/26/21 2000 115/72 -- -- 99 -- 97 % --   05/1955 113/65 -- -- -- -- -- --   05/26/21 1945 (!) 144/86 97.3  F (36.3  C) Oral 100 -- 97 % --   05/26/21 1915 134/87 -- -- 102 -- -- --   05/26/21 1900 135/82 97.7  F (36.5  C) -- 102 -- -- --   05/26/21 1815 112/65 97.3  F (36.3  C) Oral 101 22 96 % --   05/26/21 1731 112/62 97.4  F (36.3  C) Axillary -- -- 96 % --   05/26/21 1655 -- -- -- -- -- 97 % --   05/26/21 1645 133/82 -- -- 89 -- 95 % --   05/26/21 1630 130/82 -- -- 86 -- 97 % --   05/26/21 1600 123/82 -- -- 87 -- -- --   05/26/21 1523 118/89 -- -- 88 -- 97 % --   05/26/21 1456 (!) 138/91 98.3  F (36.8  C) Oral 102 22 95 % --   05/26/21 1450 -- -- -- 104 -- 94 % --   05/26/21 1445 (!) 138/91 -- -- -- -- 94 % --   05/26/21 1440 -- -- -- -- -- 94 % --   05/26/21 1430 -- -- -- -- -- 94 % --   05/26/21 1420 -- -- -- -- -- 94 % --   05/26/21 1415 -- -- -- -- -- 93 % --   05/26/21 1410 -- -- -- -- -- 94 % --   05/26/21 1400 -- -- -- -- -- 94 % --   05/26/21 1350 -- -- -- -- -- 94 % --   05/26/21 1340 -- -- -- -- -- 94 % --   05/26/21 1330 -- -- -- -- -- 93 % --   05/26/21 1320 -- -- -- -- -- 94 % --     Ranges for vital signs:  Temp:  [97.3  F (36.3  C)-98.4  F (36.9  C)] 98.4  F (36.9  C)  Pulse:  [] 104  Resp:  [16-22] 16  BP: (112-164)/() 121/81  SpO2:  [93 %-98 %] 95 %  Vitals:    05/25/21 0428 05/26/21 0600 05/27/21 0600   Weight: 103.1 kg (227 lb 6.4 oz) 100.4 kg (221 lb 4.8 oz) 102.8 kg (226 lb 9.6 oz)     Physical Examination:  GENERAL:  well-developed, well-nourished, in bed with no acute distress.  HEAD:  Head is normocephalic, atraumatic.  NG tube in  EYES:  Eyes have anicteric sclerae without conjunctival injection   NECK:  Supple. No  cervical lymphadenopathy  LUNGS:  trace inspiratory rales at the bases. Normal work of breathing on room air  CARDIOVASCULAR:  Tachycardic but normal rhythm with no murmurs, gallops or rubs.  ABDOMEN:  Bowel sounds present, non tender  SKIN:  Mild lower extremity edema  NEUROLOGIC:  Grossly nonfocal. Active x4 extremities         Laboratory Data:     No results found for: ACD4    Inflammatory Markers    Recent Labs   Lab Test 05/27/21  0449 05/26/21  0705 05/24/21  0544 05/20/21  0803 05/18/21  0746 05/17/21  0530 10/27/20  1250 10/27/20  1250 10/20/20  1305 10/13/20  1320 10/06/20  1320 09/30/20  1130 04/13/18  1437 04/13/18  1437   SED  --   --   --   --   --   --   --  10 8 10 20 9  --  18   .0* 220.0* 4.0 6.9 6.0 6.9   < > 3.5 11.0* 12.0* 5.7 0.6*   < > 9.4*    < > = values in this interval not displayed.       Immune Globulin Studies     Recent Labs   Lab Test 12/21/20  1133      IGM 55          Metabolic Studies       Recent Labs   Lab Test 05/27/21  0449 05/26/21  2324 05/26/21  1835 05/26/21  1705 05/08/21  0848 05/08/21  0848 05/06/21  1535 05/06/21  1535 05/04/21  2313 05/04/21  2313     --   --  136   < >  --    < > 136   < > 135   POTASSIUM 3.9  --   --  4.1   < >  --    < > 5.2   < > 4.0   CHLORIDE 103  --   --  104   < >  --    < > 103   < > 101   CO2 27  --   --  26   < >  --    < > 25   < > 26   ANIONGAP 5  --   --  6   < >  --    < > 9   < > 7   BUN 44*  --   --  48*   < >  --    < > 58*   < > 50*   CR 1.59*  --   --  1.82*   < >  --    < > 2.22*   < > 1.60*   GFRESTIMATED 45*  --   --  38*   < >  --    < > 30*   < > 45*   *  --   --  380*   < >  --    < > 201*   < > 115*   A1C  --   --   --   --   --   --   --   --   --  5.1   QAMAR 8.4*  --   --  8.5   < >  --    < > 8.5   < > 9.1   PHOS  --   --   --   --   --   --   --  5.7*   < > 3.8   MAG 1.9  --   --  2.1   < >  --    < > 3.0*   < > 2.3   LACT  --  1.6  --   --   --  1.0   < >  --    < >  --    PCAL  --   --   7.76*  --    < >  --   --   --   --   --     < > = values in this interval not displayed.       Hepatic Studies    Recent Labs   Lab Test 05/26/21  1340 05/24/21  0544 05/20/21  0803 05/10/21  0330 05/10/21  0330 04/30/21  0514 04/30/21  0514   BILITOTAL 0.6 0.8 0.5   < > 0.6   < >  --    DBIL 0.3* 0.3* 0.2   < > 0.3*   < >  --    ALKPHOS 123 147 102   < > 78   < >  --    PROTTOTAL 4.9* 5.5* 5.3*   < > 5.0*   < >  --    ALBUMIN 2.2* 2.8* 2.8*   < > 2.8*   < >  --    AST 26 28 28   < > 34   < >  --    ALT 52 58 46   < > 48   < >  --    LDH  --   --   --   --  386*  --  317*    < > = values in this interval not displayed.       Pancreatitis testing    Recent Labs   Lab Test 05/26/21  1340 05/04/21  2313 11/05/20  0907 08/05/20  0335   AMYLASE 49 93  --   --    LIPASE 25*  --   --  168   TRIG  --   --  91  --        Gout Labs      Recent Labs   Lab Test 03/12/21  0605 02/01/21  1127 01/09/21  0538 12/21/20  1133 11/30/20  0808   URIC 4.8 3.9 5.0 4.5 3.9       Hematology Studies      Recent Labs   Lab Test 05/27/21  0449 05/26/21  1441 05/26/21  1441 05/26/21  0705 05/26/21  0705 05/25/21  0453 05/24/21  0544 05/23/21  0542 05/22/21  0605 05/22/21  0605 05/17/21  0530 05/17/21  0530   WBC 11.7*  --   --   --  15.2* 17.2* 14.8* 12.4*  --  13.9*   < > 18.7*   ANEU  --   --  12.1*  --   --   --   --   --   --   --   --  17.2*   ALYM  --   --  0.1*  --   --   --   --   --   --   --   --  0.6*   VIJAY  --   --  0.1  --   --   --   --   --   --   --   --  0.6   AEOS  --   --  0.0  --   --   --   --   --   --   --   --  0.0   HGB 8.3*   < >  --    < > 7.2* 8.2* 8.2* 7.4*   < > 7.2*   < > 7.8*   HCT 26.9*   < >  --   --  23.9* 27.5* 26.8* 23.7*  --  23.6*   < > 24.5*   *  --   --   --  153 187 220 240  --  252   < > 292    < > = values in this interval not displayed.       Clotting Studies    Recent Labs   Lab Test 05/12/21  0622 05/10/21  0330 05/07/21  0340 05/06/21  0650   INR 1.19* 1.33* 1.38* 1.45*   PTT  --   --    --  35       Iron Testing    Recent Labs   Lab Test 05/27/21  0449 05/13/21  0534 05/13/21  0534 02/09/21  0518 02/09/21  0518 01/21/21  1350 01/21/21  1350   IRON  --   --  38  --  28*  --  36   FEB  --   --  215*  --  285  --  381   IRONSAT  --   --  18  --  10*  --  10*   VITA  --   --  98  --  33  --   --    MCV 96   < > 94   < >  --    < >  --    FOLIC  --   --  14.4  --   --   --   --    B12  --   --  734  --   --    < >  --     < > = values in this interval not displayed.       Arterial Blood Gas Testing    Recent Labs   Lab Test 05/09/21  0956 05/09/21  0902 05/09/21  0344 05/08/21 2004 05/07/21  1900 05/07/21  1900 05/07/21  1700 05/07/21  1700 05/07/21  1420 05/07/21  0340 05/07/21  0340 05/06/21  2152   PH  --   --   --   --   --  7.40  --  7.38 7.39  --  7.38 7.37   PCO2  --   --   --   --   --  38  --  41 40  --  41 44   PO2  --   --   --   --   --  101  --  102 130*  --  153* 148*   HCO3  --   --   --   --   --  23  --  24 24  --  24 25   O2PER 21 21 REPORT AMENDED: 21.0 REPORT AMENDED:   < > 2LNC   < > Canceled, Test credited  2L 40   < > 40.0  40.0 40  40    < > = values in this interval not displayed.        Thyroid Studies     Recent Labs   Lab Test 02/01/21  1127 12/18/20  1018 06/24/20  0747 07/31/19  1050 07/03/19  0828   TSH 3.52 3.45 1.30 1.23 0.82       Urine Studies     Recent Labs   Lab Test 05/25/21  1230 05/16/21  0528 05/15/21  1730 05/07/21  0804 05/05/21  0515 03/08/21  1525   URINEPH 5.5 5.5 Unsatisfactory specimen - collected in wrong container 5.0 6.0 6.0   NITRITE Negative Negative Unsatisfactory specimen - collected in wrong container Negative Negative Negative   LEUKEST Negative Negative Unsatisfactory specimen - collected in wrong container Negative Negative Negative   WBCU 4 1  --  8* 0 0       CSF testing   No lab results found.    Invalid input(s): CADAM, EVPCR, ENTPCR, ENTEROVIRUS    Medication levels    Recent Labs   Lab Test 05/27/21  0449 05/08/21 2004  05/08/21 2004   VANCOMYCIN  --   --  18.5   TACROL 10.0   < >  --     < > = values in this interval not displayed.       Body fluid stats    Recent Labs   Lab Test 05/25/21  1015   GS <10 Squamous epithelial cells/low power field  >25 PMNs/low power field  Many  Mixed gram negative and positive jaxon         Microbiology:  Fungal testing  No lab results found.    Invalid input(s): HIFUN, FUNGL    Last Culture results with specimen source  Culture Micro   Date Value Ref Range Status   05/26/2021 No growth after 10 hours  Preliminary   05/26/2021 No growth after 10 hours  Preliminary   05/26/2021 No growth after 22 hours  Preliminary   05/25/2021 Heavy growth  Normal jaxon    Final   05/17/2021 No growth  Final   05/17/2021 No growth  Final   05/15/2021 No growth  Final   11/04/2020 No growth  Final   09/23/2020 No growth  Final   09/22/2020 No growth  Final   09/21/2020 No growth  Final   09/20/2020 No growth  Final   09/19/2020 No growth  Final   09/18/2020 (A)  Final    Cultured on the 2nd day of incubation:  Staphylococcus hominis  Susceptibility testing done on previous specimen     09/18/2020   Final    Critical Value/Significant Value, preliminary result only, called to and read back by  Tamy Leventhal RN [id number] 9/20/20 AM     09/18/2020 No growth  Final    Specimen Description   Date Value Ref Range Status   05/26/2021 Blood PURPLE PORT  Final   05/26/2021 Blood Left Hand  Final   05/26/2021 Blood PURPLE PORT  Final   05/26/2021 Blood  Final   05/25/2021 Urine  Final   05/25/2021 Sputum  Final   05/25/2021 Sputum  Final   05/17/2021 Blood  Final   05/17/2021 Blood  Final   05/15/2021 Blood PURPLE PORT  Final   05/06/2021 Nares  Final   11/04/2020 Blood Unspecified Site  Final   09/23/2020 Blood Left Arm  Final   09/22/2020 Blood Left Arm  Final   09/21/2020 Blood Left Arm  Final   09/20/2020 Blood Left Arm  Final        Last check of C difficile  No results found for: CDBPCT    Infection Studies to assess  Diarrhea No lab results found.    Virology:  Coronavirus-19 testing    Recent Labs   Lab Test 05/20/21  0640 05/12/21  1000 05/04/21  2357 04/23/21  2055 01/04/21  0947 01/04/21  0947 09/17/20  1600 08/05/20  0938 08/01/20  1126   [id number] Nasopharyngeal Nasopharyngeal Nasopharyngeal Nasopharyngeal   < > Nasopharyngeal Nasopharyngeal Nasopharyngeal  --    SARSCOVRES NEGATIVE NEGATIVE NEGATIVE NEGATIVE   < > NEGATIVE NEGATIVE NEGATIVE  --    [id number]  --   --   --   --   --  Nasopharyngeal Nasopharyngeal Nasopharyngeal Nasopharyngeal   [id number]  --   --   --   --   --  Test received-See reflex to IDDL test SARS CoV2 (COVID-19) Virus RT-PCR Test received-See reflex to IDDL test SARS CoV2 (COVID-19) Virus RT-PCR Test received-See reflex to IDDL test SARS CoV2 (COVID-19) Virus RT-PCR Not Detected    < > = values in this interval not displayed.       Respiratory virus (non-coronavirus-19) testing    Recent Labs   Lab Test 03/05/21  1655 02/28/21  1020 02/12/21  0915 01/15/20  2210   AFLU  --   --   --  Negative   IFLUA Not Detected Not Detected Not Detected Not Detected   INFZA  --  Negative  --   --    FLUAH1 Not Detected Not Detected Not Detected Not Detected   VM2507 Not Detected Not Detected Not Detected Not Detected   FLUAH3 Not Detected Not Detected Not Detected Not Detected   BFLU  --   --   --  Positive*   IFLUB Not Detected Not Detected Not Detected Detected, Abnormal Result*   INFZB  --  Negative  --   --    PIV1 Not Detected Not Detected Not Detected Not Detected   PIV2 Not Detected Not Detected Not Detected Not Detected   PIV3 Not Detected Not Detected Not Detected Not Detected   PIV4 Not Detected Not Detected Not Detected Not Detected   RSVA Not Detected Not Detected Not Detected Not Detected   RSVB Not Detected Not Detected Not Detected Not Detected   HMPV Not Detected Not Detected Not Detected Not Detected   ADENOV Not Detected Not Detected Not Detected Not Detected   CORONA Not Detected Not  Detected Not Detected Not Detected       No results found for: CMVLOG    No results found for: H6RES    No results found for: EBRES    No results found for: 35301, BKRES    Parvovirus Testing  No lab results found.    Invalid input(s): PRVRES    Adenovirus Testing  No lab results found.    Invalid input(s): ADENAB, ADAG, ADENOVIRUS, ADQT    Imaging:  Recent Results (from the past 48 hour(s))   XR Gastrografin Challenge    Narrative    Exam: XR GASTROGRAFIN CHALLENGE, 5/25/2021 11:08 PM    Indication: Gastrografin UGI Challenge    Comparison: Abdominal radiograph 5/25/2021, CT CAP 5/18/2021    Findings:   Supine frontal views of the abdomen obtained 8 hours, 9 hours, and 11  hours after administration of Gastrografin via NG tube.    On the 8 hour film, Gastrografin is seen to the mid transverse colon.  On the 9 hour film, contrast is progressed to the splenic flexure. On  the 11 hour film contrast is in the proximal descending colon.    Nonobstructive bowel gas pattern. Gastric tube tip projects over the  stomach. Small bilateral pleural effusions and bibasilar opacities.      Impression    Impression:   By 11 hours after administration of Gastrografin, contrast is seen in  the proximal descending colon. No evidence of bowel obstruction.     I have personally reviewed the examination and initial interpretation  and I agree with the findings.    WILNER CADET MD   XR Abdomen Port 1 View    Narrative    Exam: XR ABDOMEN PORT 1 VIEWS, 5/26/2021 9:08 AM    Indication: recheck abd after gastrografin 5/25    Comparison: Gastrografin challenge 5/25/2021. CT chest abdomen pelvis  5/18/2021.    Findings:   Portable AP supine radiograph. Radiograph approximately 20 hours  post-Gastrografin ingestion (compared to time stamp on 8:00 PM  radiograph 5/25/2021). Gastric tube tip and sidehole overlying the  proximal stomach. 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 There is some residual  contrast demonstrated in  the region of the gastric pouch. Gastrografin contrast material  demonstrated throughout the colon with slight distal progression from  prior study into the descending colonic region. No air filled  distended small bowel loops. Limited assessment for free air on supine  imaging.    Postsurgical changes in the thorax demonstrated with sternotomy wires,  partially visualized abandoned pacemaker tip and interstitial airspace  changes. Please see recent chest radiograph.      Impression    Impression: On this approximate 20 hour post-Gastrografin  administration radiograph, there has been slight distal progression of  Gastrografin into the descending colonic region. No air filled  distended small bowel loops.    JEROME CUMMINGS MD   CT Chest Abdomen Pelvis w/o Contrast    Narrative    CT of the Chest, Abdomen and Pelvis without contrast, 5/26/2021 3:35  PM.    Comparison: Abdominal x-ray 5/26/2021. CT chest, abdomen, and pelvis  5/18/2021.    History: Abdominal pain, aortic dissection suspected.     Technique: Axial images of the chest, abdomen and pelvis were obtained  without contrast. Coronal reconstructions were provided. Images were  reviewed in bone, lung, and soft tissue windows.     Findings:  Support devices: Right upper extremity PICC tip terminates in the mid  SVC. Left internal jugular central catheter tip terminates in the high  SVC. Enteric tube tip and sidehole terminating in the gastric lumen.    Chest:   Postsurgical heart transplant changes. Median sternotomy wires are  intact. Thyroid gland is normal. Heart size is normal. No pericardial  effusion. Aortic arch atherosclerotic calcifications. Thoracic aorta  is normal in caliber. Conventional three-vessel branching pattern of  the aortic arch. Main pulmonary artery is normal in caliber.    No mediastinal or axillary lymphadenopathy by size criteria. Prominent  pretracheal lymph node measuring 7 mm on (series 3, image  124).  Esophagus is normal.    Trachea and central airways are clear. Multifocal left greater than  right. Peribronchovascular groundglass and consolidative opacities,  increased since prior exam. A few of these opacities demonstrate  central hypoattenuation, increased from prior exam. For example in the  left upper lobe (series 4, image 88) and lingula (series 4, image  122). No pneumothorax. Small right greater than left pleural  effusions, increased from prior exam 5/18/2021. Bibasilar  consolidative opacities. No pneumothorax.    Abdomen and Pelvis:   Limited evaluation due to noncontrast technique. Liver is  unremarkable. Cholelithiasis. No pericholecystic fluid. Spleen size is  within normal limits. Pancreas is unremarkable.    No adrenal mass. No hydronephrosis, calcified stone, or contour  deforming mass. Bladder is distended and normal in appearance.  Prostate is within normal limits.    Contrast appreciated within the large bowel. No small or large bowel  dilation. The appendix is not well visualized, however there are no  significant inflammatory changes in the right lower quadrant. No free  intraperitoneal air. No portal venous gas. No significant  diverticulosis. Moderate colonic stool burden, decreased.    Abdominal aorta and major branches are normal in caliber with  scattered predominantly aortoiliac atherosclerotic calcifications.    No mesenteric, retroperitoneal, or pelvic lymphadenopathy by size  criteria.    Bones and Soft Tissues:   No suspicious osseous lesion. No acute osseous lesion. Degenerative  changes of the thoracolumbar spine. No suspicious mass. Anasarca.  Decreased subcutaneous gas along the bilateral left greater than right  chest wall and neck.      Impression    Impression:   1. Postsurgical heart transplant changes with interval increased  multifocal, peribronchial vascular groundglass and consolidative  opacities concerning for infection. Some of these opacities  demonstrate  central hypoattenuation suggestive for organizing  pneumonia.  2. Increased small right greater than left pleural effusions with  adjacent consolidation.  3. Cholelithiasis without additional evidence to suggest acute  cholecystitis.  4. Decreased subcutaneous gas along the bilateral chest walls and  neck.  5. Moderate colonic stool burden, decreased.    I have personally reviewed the examination and initial interpretation  and I agree with the findings.    MICHAEL FERNANDEZ MD

## 2021-05-27 NOTE — CONSULTS
[facility]   Pulmonary   Consult Note 5/27/2021  Jim Willingham MRN: [id number]    We were consulted for evaluation of pneumonia on CT in post-heart transplant patient.    Assessment & Plan      Jim Willingham is a 65 year old gentleman with medical history notable for nonischemic cardiomyopathy with EF 20% complicated by ventricular tachycardia, status post CRT-D and LVAD in June 2017 (originally destination therapy secondary to obesity but with weightloss became a transplant candidate) and now status post OHT on 5/6/2021. Post-transplant course complicated by development of pneumonia.    Overall, he is clinically improving with downtrending leukocytosis and inflammatory markers and he is oxygenating well on room air. CT imaging shows evidence of increased consolidation with basilar predominance. Given his clinical improvement with current antimicrobial regimen with noninvasive fungal workup pending, bronchoscopy is not indicated at this time. Can reconsider role of bronchoscopy if new concerns arise on subsequent imaging or he clinically worsens.    Given the basilar predominant consolidations, he would benefit from ongoing pulmonary hygiene including chest physiotherapy and nebs.    Recommendations:    - no indication for bronchoscopy at this time   - pulmonary hygiene    - incentive spirometry    - flutter valve    - chest physiotherapy    - albuterol nebs QID scheduled   -infectious workup and antimicrobial regimen per ID    We will continue to follow.    Patient seen & discussed w/  Dr. Fuller, who agrees with the above assessment and plan.    Yunior Laboy M.D.  Pulmonary and Critical Care Medicine Fellow          History of Present Illness:   Jim Willingham is a 65 year old gentleman with medical history notable for nonischemic cardiomyopathy with EF 20% complicated by ventricular tachycardia, status post CRT-D and LVAD in June 2017 (originally destination therapy secondary to obesity but with  cherelle became a transplant candidate) and now status post OHT on 5/6/2021. Post-transplant course complicated by development of pneumonia. CT imaging yesterday notable for increased multifocal groundglass and consolidative opacities (infection versus organizing pneumonia).    Notable labs include WBC 15.2 -> 11.7,  -> 140, procalcitonin 0.08 -> 7.76, aspergillus galactomannan pending, Fungitell pending, histoplasma Ag pending, cryptococcus Ag pending. His pneumonia is currently being treated with Zosyn and clinically improving with downtrending inflammatory markers and patient reporting subjective improvement in his symptoms. He has a nonproductive cough and dyspnea is improved. No new concerns.          Review of Symptoms:   10-point ROS reviewed, & found negative w/ exceptions noted in the HPI.          Past Medical History:     Past Medical History:   Diagnosis Date     Bariatric surgery status 2003     Benign essential hypertension 05/11/2017     Bilateral carpal tunnel syndrome 11/02/2020     Cardiomyopathy, unspecified (H) 05/08/2017     CKD (chronic kidney disease) stage 3, GFR 30-59 ml/min 05/11/2017     COPD (chronic obstructive pulmonary disease) (H) 11/02/2020     Depression 05/11/2017     Diabetes mellitus (H) 1995     Gouty arthropathy, chronic, without tophi 11/02/2020     H/O gastric bypass 05/11/2017     ICD (implantable cardioverter-defibrillator), biventricular, in situ 05/11/2017     LVAD (left ventricular assist device) present (H)      Major depression, recurrent, chronic (H) 11/02/2020     NICM (nonischemic cardiomyopathy) (H)/ EF 20% 05/11/2017    ECHO: LVEDd. 7.66 cm, Restrictive pattern , Severe mitral valve regurgitation     CECILIA (obstructive sleep apnea) 05/11/2017     Paroxysmal atrial fibrillation (H) 05/11/2017     Paroxysmal VT (H) 05/11/2017     Rotator cuff tear arthropathy of both shoulders 11/02/2020     Uncomplicated asthma      Vitamin B12 deficiency (non anemic)  05/11/2017       Past Surgical History:   Procedure Laterality Date     ANESTHESIA CARDIOVERSION N/A 05/11/2020    Procedure: ANESTHESIA, FOR CARDIOVERSION @1100;  Surgeon: GENERIC ANESTHESIA PROVIDER;  Location: [facility] OR     CV HEART BIOPSY N/A 5/13/2021    Procedure: Heart Biopsy;  Surgeon: Scout Robins MD;  Location:  HEART CARDIAC CATH LAB     CV HEART BIOPSY N/A 5/20/2021    Procedure: Heart Biopsy;  Surgeon: Jeffrey Gibson MD;  Location: [facility] HEART CARDIAC CATH LAB     CV RIGHT HEART CATH MEASUREMENTS RECORDED N/A 07/24/2019    Procedure: CV RIGHT HEART CATH;  Surgeon: Renu Sears MD;  Location:  HEART CARDIAC CATH LAB     CV RIGHT HEART CATH MEASUREMENTS RECORDED N/A 08/05/2020    Procedure: CV RIGHT HEART CATH;  Surgeon: Nicola Seth MD;  Location:  HEART CARDIAC CATH LAB     CV RIGHT HEART CATH MEASUREMENTS RECORDED N/A 01/07/2021    Procedure: CV RIGHT HEART CATH;  Surgeon: Jac Dover MD;  Location:  HEART CARDIAC CATH LAB     CV RIGHT HEART CATH MEASUREMENTS RECORDED N/A 02/23/2021    Procedure: Heart Cath Right Heart Cath;  Surgeon: Jeffrey Gibson MD;  Location:  HEART CARDIAC CATH LAB     CV RIGHT HEART CATH MEASUREMENTS RECORDED N/A 03/23/2021    Procedure: Heart Cath Right Heart Cath. request for 3/23;  Surgeon: Jeffrey Gibson MD;  Location:  HEART CARDIAC CATH LAB     CV RIGHT HEART CATH MEASUREMENTS RECORDED N/A 5/13/2021    Procedure: Right Heart Cath;  Surgeon: Scout Robins MD;  Location:  HEART CARDIAC CATH LAB     CV RIGHT HEART CATH MEASUREMENTS RECORDED N/A 5/20/2021    Procedure: Right Heart Cath;  Surgeon: Jeffrey Gibson MD;  Location:  HEART CARDIAC CATH LAB     GI SURGERY  2003    [facility]     INSERT VENTRICULAR ASSIST DEVICE LEFT (HEARTMATE II) N/A 06/19/2017    Procedure: INSERT VENTRICULAR ASSIST DEVICE LEFT (HEARTMATE II);  Median Sternotomy Heartmate II Left Ventricular  "Assist Device Insertion on Pump Oxygenator;  Surgeon: Ronnie Quigley MD;  Location:  OR     ORTHOPEDIC SURGERY      right knee wired     PICC DOUBLE LUMEN PLACEMENT Right 2020    5FR PICC DL. Length-43cm (1cm out).     PICC INSERTION - DOUBLE LUMEN Right 2021    IK/BRACH     RELEASE CARPAL TUNNEL BILATERAL Bilateral 2021    Procedure: Bilateral carpal tunnel release;  Surgeon: Jermaine Brand MD;  Location:  OR     TRANSPLANT HEART RECIPIENT N/A 2021    Procedure: Redo median sternotomy, lysis of adhesions, heart transplant recipient, on cardiopulmonary bypass, intraoperative transesophageal echocardiogram per anesthesia, Implantable Cardioverter Defibrillator (ICD) removal;  Surgeon: Ronnie Quigley MD;  Location:  OR            Allergies:     Allergies   Allergen Reactions     Beer Shortness Of Breath     Grass Shortness Of Breath     Ace Inhibitors Cough     Dust Mites Other (See Comments)     Asthma     Mold Other (See Comments)     Asthma     Penicillins Other (See Comments)     Unknown - childhood exposure    Tolerated Zosyn -2020     Sulfa Drugs Other (See Comments) and Unknown     Unknown childhood reaction           Family History:     Family History   Problem Relation Age of Onset     Cerebrovascular Disease Mother 64     Diabetes Mother      Hypertension Mother      Coronary Artery Disease Father      Diabetes Type 2  Father      Obesity Brother      Obesity Brother      Cerebrovascular Disease Daughter 40               Social History:     Social History     Tobacco Use     Smoking status: Former Smoker     Quit date:      Years since quittin.4     Smokeless tobacco: Never Used   Substance Use Topics     Alcohol use: No     Drug use: No             Physical Exam:   /81 (BP Location: Left arm)   Pulse 104   Temp 98.4  F (36.9  C) (Oral)   Resp 16   Ht 1.727 m (5' 8\")   Wt 102.8 kg (226 lb 9.6 oz)   SpO2 95%   BMI 34.45 kg/m      General: alert, " pleasant, in no acute distress, sitting upright in bed  HENT: sclera anicteric, nasal tube in place  Lungs: scattered inspiratory crackles, decreased breath sounds in both bases, no wheezing or rhonchi, not in acute respiratory distress, oxygenating well on room air  Heart: regular rate, no mumurs  Abdomen: soft, nontender, nondistended  Extremities: mild pitting edema in lower extremities, no obvious deformities  Skin: no concerning rashes or lesions  Neurologic: alert, interactive, answers questions appropriately, nonfocal exam          Data:   Labs (all laboratory studies reviewed by me): notable labs in HPI above.    Imaging and other diagnostic testing (all imaging studies reviewed by me)    Chest xray 5/25/2021:  Impression:   1. Increased bibasilar mixed interstitial and airspace opacities, likely atelectasis versus pulmonary edema.  2. Small bilateral pleural effusions.  3. No pneumothorax.    CT chest 5/26/2021:  Impression:   1. Postsurgical heart transplant changes with interval increased multifocal, peribronchial vascular groundglass and consolidative opacities concerning for infection. Some of these opacities demonstrate central hypoattenuation suggestive for organizing pneumonia.  2. Increased small right greater than left pleural effusions with adjacent consolidation.  3. Cholelithiasis without additional evidence to suggest acute cholecystitis.  4. Decreased subcutaneous gas along the bilateral chest walls and neck.  5. Moderate colonic stool burden, decreased.    PFTs 1/21/2021:  The FVC, FEV1 and FEV1/FVC ratio are reduced.  The inspiratory flow rates are within normal limits.  The TLC is within normal limits but the RV and RV/TLC ratio are increased.  The diffusing capacity is normal after correction for hemoglobin.    IMPRESSION:   - Severe airflow obstruction.   - Lung volumes suggest air trapping.   - Normal diffusing capacity.    Transthoracic echocardiogram 2/8/2021:  Interpretation  Summary  - Post OHT 5/6/21  - The patient's rhythm is sinus tachycardia.  - Global and regional left ventricular function is hyperkinetic with an EF of 65-70%.  - Global right ventricular function is normal.  - This study was compared with the study from 5/9/2021 .  - There has been no change.

## 2021-05-27 NOTE — PROGRESS NOTES
[facility]   Cardiology II Service / Advanced Heart Failure  Daily Progress Note      Patient: Jim Willingham  MRN: [id number]  Admission Date: 2/8/2021  Hospital Day # 108    Assessment and Plan: Jim Willingham is a 65 year old male with history of NICM EF 20% c/b VT s/p CRT-D s/p HMII LVAD 6/19/2017 originally intended as destination therapy 2/2 obesity however with recent weight loss now candidate for transplantation. He is admitted for decompensated heart failure with subsequent s/p OHT 5/6/21. Course has been complicated by pneumonia.    Today's Plan:  - RHC and biopsy today per protocol   - Continue Zosyn for now  - Appreciate transplant ID consult  - F/u tac level, on gtt for now  - When able to tolerate PO meds will convert back to tac, prednisone and cellcept and restart all ppx  - Agree with pulmonary consult  - NG tube management per primary team    S/p OHT.   5/6/21 secondary to NICM w/history of HM3. Echo 5/14/21 conistent with EF 65-70%, mildly decreased RV function, and dilated IVC. RHC 5/20/21 with mRA-11, RV-35/11, mPA-23, mPCWP-11, MICHELLE CO-6.2, and MICHELLE CI-2.7. DSA negative 5/13/21.     Immunosuppression: Simulect induction 5/6 and 5/10. Tac gtt increased to 110 mcg/hr today's level pending, goal 10-12. IV Cellcept 1000 mg po BID. Solumedrol 16 mg in AM and 12 mg in the evening.      Serostatus: CMV: D+/R+, EBV: D+/R+, Toxo D-/R-  Prophylaxis: Dapsone, Nystatin, and Valcyte.   - Continue Crestor and ASA (okay to hold when he is not tolerating PO)  - Repeat RHC/biopsy today per protcol   - Standing Bumex on hold in setting of inability to tolerate oral intake, further dosing pending RHC     Leukocytosis.   Pneumonia  CT Chest positive for left lung base consolidation vs atelectasis. Initially abx held given improving leukocytosis off antimicrobials and no symptoms. Levaquin initiated with concern for HCAP when leukocytosis was worsening, broadened to zosyn. Repeat CT with worsened  "ground glass opacities from prior Small R>L pleural effusions. Additionally, there had been concern for abdominal component d/t vomitting Sun/Mon, but reassuring CT.  - Appreciate transplant ID consult  - Pulm consult for consideration of bronch, pulmonary toilet recs  - Trend blood cultures: NGTD  - Sputum culture- heave growth, normal jaxno, CTM  - F/u Fungitell (sent 5/25)  - F/u Aspergillus antigen (sent 5/25)  - Continue Zosyn for now     Nausea with Emesis. Dysphagia with Dyspepsia. History of Gastric bypass surgery. Abd X-ray with improved stool burden 5/24. Large BM with persistent nausea. Gastrogafin study negative for obstruction. CT abdomen/pelvis without obvious source of abdominal pathology. LFTS and amylase/lipase WNL.  - Consider bariatric surgery consult if he does not tolerate diet progression   - NG management per CVTS   - Aggressive bowel regimen per CVTS.      Right pleural tube air leak, resolved.   - Management per CVTS.     Didi Butler PA-C  Advanced Heart Failure/Cardiology II Service  Pager [phone] [facility] [id number]    ================================================================    Subjective/24-Hr Events:   Last 24 hr care team notes reviewed. Feeling better today. Slept well last night. More energy. No fevers/chills. Still feeling SOB, but greatly improved. Has not been OOB yet. No abdominal pain, nausea, vomiting or diarrhea. Some LE edema, unchanged from yesterday.    ROS:  4 point ROS including respiratory, CV, GI and  (other than that noted in the HPI) is negative.     Medications: Reviewed in EPIC.     Physical Exam:   /84 (BP Location: Left arm)   Pulse 107   Temp 97.7  F (36.5  C) (Oral)   Resp 18   Ht 1.727 m (5' 8\")   Wt 102.8 kg (226 lb 9.6 oz)   SpO2 94%   BMI 34.45 kg/m      GENERAL: Appears comfortable, in no distress, significant improvement from yesterday.  HEENT: Eye symmetrical, no discharge or icterus bilaterally. Mucous membranes moist and " without lesions.  NECK: Supple, JVD ~7-8.   CV: Tachycardic, regular, +S1S2, no murmur, rub, or gallop.   RESPIRATORY: Respirations regular, even, and unlabored. Soft rhonchi at left lower lung base, unchanged from yesterday's exam  GI: Soft and non distended with normoactive bowel sounds present in all quadrants. No tenderness, rebound, guarding.   EXTREMITIES: Trace-1+ peripheral edema. 2+ bilateral pedal pulses.   NEUROLOGIC: Alert and interacting appropriately. No focal deficits.   MUSCULOSKELETAL: No joint swelling or tenderness.   SKIN: No jaundice. No rashes or lesions.     Labs:  CMP  Recent Labs   Lab 05/27/21  0449 05/26/21  1705 05/26/21  1340 05/26/21  0705 05/25/21  1627 05/24/21  0544 05/24/21  0544    136  --  138 139   < > 137   POTASSIUM 3.9 4.1  --  4.3 4.4   < > 3.9   CHLORIDE 103 104  --  106 105   < > 103   CO2 27 26  --  27 25   < > 28   ANIONGAP 5 6  --  5 9   < > 6   * 380*  --  234* 154*   < > 86   BUN 44* 48*  --  50* 53*   < > 59*   CR 1.59* 1.82*  --  1.76* 1.94*   < > 1.97*   GFRESTIMATED 45* 38*  --  40* 36*   < > 35*   GFRESTBLACK 52* 45*  --  46* 41*   < > 40*   QAMAR 8.4* 8.5  --  8.3* 8.1*   < > 8.2*   MAG 1.9 2.1  --  2.2 2.0   < > 2.0   PROTTOTAL  --   --  4.9*  --   --   --  5.5*   ALBUMIN  --   --  2.2*  --   --   --  2.8*   BILITOTAL  --   --  0.6  --   --   --  0.8   ALKPHOS  --   --  123  --   --   --  147   AST  --   --  26  --   --   --  28   ALT  --   --  52  --   --   --  58    < > = values in this interval not displayed.       CBC  Recent Labs   Lab 05/27/21 0449 05/26/21  1705 05/26/21  1340 05/26/21  0705 05/25/21  0453 05/24/21  0544   WBC 11.7*  --   --  15.2* 17.2* 14.8*   RBC 2.81*  --   --  2.40* 2.73* 2.74*   HGB 8.3* 7.2* 6.7* 7.2* 8.2* 8.2*   HCT 26.9* 24.0*  --  23.9* 27.5* 26.8*   MCV 96  --   --  100 101* 98   MCH 29.5  --   --  30.0 30.0 29.9   MCHC 30.9*  --   --  30.1* 29.8* 30.6*   RDW 16.8*  --   --  17.4* 18.0* 17.6*   *  --   --   153 187 220       INR  No lab results found in last 7 days.    Time/Communication  I personally spent a total of 30 minutes. Of that >20 minutes was counseling/coordination of patient's care. Plan of care discussed with patient. See my note above for details.    Patient discussed with Dr. Montgomery.

## 2021-05-27 NOTE — PROGRESS NOTES
Cardiovascular Surgery Progress Note  05/27/2021         Assessment and Plan:     Mr. Jim Willingham is a 65 year old male with history of NICM EF 20% c/b VT s/p CRT-D s/p HMII LVAD 6/19/2017 originally intended as destination therapy 2/2 obesity however with recent weight loss now candidate for transplantation. He was admitted 2/8/21 for worsening functional status and renal function concerning for worsening heart failure. He was eventually listed status 2E for transplant and received a donor offer.   Jim is now s/p orthotopic heart transplant on 5/6/21 with Dr. Ronnie Quigley. He was shocked > 2x received amio, mag and lidocaine while coming off bypass. Known lung injury with isolated air leak to right pleural that eventually resolved, CT removed 5/23.      Cardiovascular:   Hx of NICM EF 20%, VT (ICD)  Hx HeartMate II LVAD 6/19/2017  Cardiorenal syndrome   S/P orthotopic heart transplant   No arrhythmia, HD stable.  Most recent echo showed LV EF 65-70% on 5/9/21.  Basiliximab 5/10.  Terbutaline weaned to off.    ASA 81 mg, Crestor 10 mg daily.   Diuresis with Bumex 2 mg PO BID, Cards II following. Diuresing as per Cards II. Lymphedema wraps 5/18, improving.   Chest tubes: Airleak was isolated to Right pleural. Had some CP after walking off suction 5/13 with associated subcutaneous emphysema, needed portable suction while ambulating and after improvement was able to ambulate OFF suction 5/19. He was kept to -10 mmHg while in room to reduce subcutaneous emphysema from getting worse. Very slow intermittent air leak 5/19, this resolved by 5/22. R pleural removed 5/23 without complication. Stable left hemidiaphragm elevation.   TPW: removed.       Pulmonary:  Hx COPD and CECILIA on CPAP  Left hemidiaphragm elevation   Air leak from Right pleural tube (unintended injury during surgery)   Possible HAP - persistent opacity in Left retrocardiac base  - PTA Breo ellipta, Incruse ellipta, and Singulair   - Extubated POD 1 to  4 lpm via NC. Now saturating well on RA.   - Pulm toilet, IS, activity and deep breathing  - R pleural fluid Triglycerides normal on 5/17 (first day of increased drainage)  - Levaquin 750 mg every day for five days (since 5/22) switched to IV Zosyn 5/25 am due to leukocytosis.    - Pneumonia-CT notable for increased multifocal groundglass and consolidative opacities.- cont with Zosyn     Neurology / MSK:  Hx Depression  - PTA bupropion and amitriptyline  Post-op Status  - Neuro intact  - Acute post-operative pain well controlled with acetaminophen, PO dilaudid PRN, IV dilaudid PRN  R Hip Pain   - Had R hip pain increased over 2 days, was keeping him from raising legs into bed independently. Pain with flexion and abduction. Hip X-ray 5/16 without acute concerns, tried ice and pain has resolved.        / Renal:  WALTER on CKD stage III   - Cardiorenal syndrome improving. Most recent creatinine 1.59.    - Day before surgery weight 102.3 kg.      GI / FEN:   Hx Sylvester-en-Y gastric bypass  Non-severe Mild Protein-Calorie Malnutrition   - No Hx N/V or short gut syndrome after RNY (2003).   - Regular diet with supplements, continue bowel regimen.   - Replace electrolytes as needed, hepatic enzymes WNL.  Nausea with Emesis   - +BM 5/18 with lactulose, dosed again 5/24 per patient request. Suppository 5/25 AM with + BM but still feeling nauseated after BM. No clear obstructive signs on Abd Xray.   - Placed NG tube 5/25 with low intermittent suction. Gastrograffin challenge 5/25, contrast traveled to colon. He is feeling better 5/26 am.   - Pink Lady enema 5/26 at 3 pm.   - Consider GI/Bariatric surgery consult 5/27 if not improving. Pt much improved 5/27. NG will be removed today.     Endocrine:  DMT2, was on Insulin PTA  Gout   Stress induced hyperglycemia initially managed on insulin drip postop, transitioned to NPH BID and sliding scale. Endocrine consulted, signed off 5/9.   - Converted to Lantus 5/14 am, continue prandial  and high resistance sliding corrective scale. Lantus on HOLD with reduced intake, he is nauseated and less intake. He had been using 30 U daily. Will do glucose checks q 4 hrs 5/26.   - PTA allopurinol.     Infectious Disease:  Stress induced leukocytosis  HAP, persistent left lower base opacity   - WBC 15.2, remains afebrile, no overt signs or symptoms of infection but suspicion remains for aspiration pneumonia. UA clean 5/15. Procalcitonin and CRP both very low 5/24.   - CT Ch/ Abd/ Pelvis 5/18 done for persistent elevated WBC; small consolidative opacity in LL base with ground glass opacities in both lung bases, ? atypical infection with some degree of atelectasis. WBC has been decreasing since, saturating well, no cough, afebrile. CXR 5/22 confirming his persistent elevated Left sandor-diaphragm. CXR 5/25 with evolving LLL opacity suggesting possible aspiration.   - Levaquin switched to Zosyn 5/25 am due to rising WBC. Sputum and UA sent 5/25. NGTD. Blood culture sent 5/26.     - WBC 11.7,  trending down.     Hematology:   Acute blood loss anemia and thrombocytopenia  Hgb 8.3 stable, Plts WNL, no signs or symptoms of active bleeding     Anticoagulation:   - ASA only     Prophylaxis:   - Stress ulcer prophylaxis: Pantoprazole 40 mg BID for Hx RNY and nausea   - DVT prophylaxis: Subcutaneous heparin, SCD     Disposition:   - Transferred to  on 5/10   - Therapies currently recommending discharge to ARU    Discussed with CVTS Fellow as needed.  Staff surgeons have been informed of changes through both verbal and written communication.      Gaviota Perez PA-C  Cardiothoracic Surgery  [phone]          Interval History:     No overnight events. Feels better than yesterday.  States pain is well managed on current regimen. Slept ok overnight.  Tolerating NPO with NG for decompression. Wants NG tube out- irritating his throat.   He is passing flatus, Loose BM today. No nausea or vomiting since NG in  "place.    Breathing well without complaints and weaned to RA per RN today.    Denies chest pain, palpitations, dizziness, syncopal symptoms, fevers, chills, myalgias, or sternal popping/clicking.         Physical Exam:   Blood pressure 121/81, pulse 104, temperature 98.4  F (36.9  C), temperature source Oral, resp. rate 16, height 1.727 m (5' 8\"), weight 102.8 kg (226 lb 9.6 oz), SpO2 95 %.  Vitals:    05/25/21 0428 05/26/21 0600 05/27/21 0600   Weight: 103.1 kg (227 lb 6.4 oz) 100.4 kg (221 lb 4.8 oz) 102.8 kg (226 lb 9.6 oz)      Weight; - 2 kg since surgery and trending down.   24 hr Fluid status; net even.   MAPs: 88 - 98     Gen: A&Ox4, NAD  Neuro: no focal deficits but looks tired  CV: RRR, normal S1 S2, no murmurs, rubs or gallops.   Pulm: CTA, soft expiratory wheezing without rhonchi, normal breathing on RA  Abd: nondistended, normal BS, soft, nontender  Ext: moderate peripheral edema, 2+ pitting  Incision: clean, dry, intact, no erythema, sternum stable  Tubes/drain sites: dressing clean and dry         Data:    Imaging:  reviewed recent imaging, no acute concerns    Gastrografin Challenge 5/25-   Impression:   By 11 hours after administration of Gastrografin, contrast is seen in the proximal descending colon.  No evidence of bowel obstruction. If desired, follow-up abdominal radiograph can be obtained to  further evaluate contrast progression to the rectum.    Labs:  BMP  Recent Labs   Lab 05/27/21 0449 05/26/21  1705 05/26/21  0705 05/25/21  1627    136 138 139   POTASSIUM 3.9 4.1 4.3 4.4   CHLORIDE 103 104 106 105   QAMAR 8.4* 8.5 8.3* 8.1*   CO2 27 26 27 25   BUN 44* 48* 50* 53*   CR 1.59* 1.82* 1.76* 1.94*   * 380* 234* 154*     CBC  Recent Labs   Lab 05/27/21 0449 05/26/21  1705 05/26/21  1340 05/26/21  0705 05/25/21  0453 05/24/21  0544   WBC 11.7*  --   --  15.2* 17.2* 14.8*   RBC 2.81*  --   --  2.40* 2.73* 2.74*   HGB 8.3* 7.2* 6.7* 7.2* 8.2* 8.2*   HCT 26.9* 24.0*  --  23.9* 27.5* " 26.8*   MCV 96  --   --  100 101* 98   MCH 29.5  --   --  30.0 30.0 29.9   MCHC 30.9*  --   --  30.1* 29.8* 30.6*   RDW 16.8*  --   --  17.4* 18.0* 17.6*   *  --   --  153 187 220     INR  No lab results found in last 7 days.   Hepatic Panel  Recent Labs   Lab 05/26/21  1340 05/24/21  0544   AST 26 28   ALT 52 58   ALKPHOS 123 147   BILITOTAL 0.6 0.8   ALBUMIN 2.2* 2.8*     GLUCOSE:   Recent Labs   Lab 05/27/21  1132 05/27/21  0818 05/27/21  0559 05/27/21  0449 05/27/21  0358 05/26/21  2105 05/26/21  1705 05/26/21  1629 05/26/21  0705 05/26/21  0705 05/25/21  1627 05/25/21  1627 05/25/21  0453 05/25/21  0453 05/24/21  1724 05/24/21  1724   GLC  --   --   --  264*  --   --  380*  --   --  234*  --  154*  --  108*  --  74   * 220* 255*  --  268* 255*  --  290*   < >  --    < >  --    < >  --    < >  --     < > = values in this interval not displayed.

## 2021-05-27 NOTE — PROVIDER NOTIFICATION
05/26/21 1700   Call Information   Date of Call 05/26/21   Time of Call 1715   Name of person requesting the team Lydia   Title of person requesting team RN   RRT Arrival time 1717   Time RRT ended 1740   Reason for call   Type of RRT Adult   Primary reason for call Sepsis suspected   Sepsis Suspected Elevated Lactate level;WBC <4 or >12   Was patient transferred from the ED, ICU, or PACU within last 24 hours prior to RRT call? No   SBAR   Situation LA 2.5   Background Jim Willingham is a 65 year old male with history of NICM EF 20% c/b VT s/p CRT-D s/p HMII LVAD 6/19/2017 originally intended as destination therapy 2/2 obesity however with recent weight loss now candidate for transplantation. He is admitted for decompensated heart failure with subsequent s/p OHT 5/6/21.   Notable History/Conditions Cardiac;Transplant;Recent surgery   Assessment AVSS. c/o SOB with exertion. no apparent distress currently. denies CP   Interventions Labs  (getting blood)   Patient Outcome   Patient Outcome Stabilized on unit   RRT Team   Attending/Primary/Covering Physician CVTS   Date Attending Physician notified 05/26/21   Time Attending Physician notified 1733   Physician(s) Tanvi DURAN   Lead RN Cate Freeman W   RT Annie    Post RRT Intervention Assessment   Post RRT Assessment Stable/Improved   Date Follow Up Done 05/26/21   Time Follow Up Done 2056   Comments VSS no change

## 2021-05-27 NOTE — PLAN OF CARE
"  Problem: Cardiac Output Decreased (Heart Failure)  Goal: Optimal Cardiac Output  Outcome: Improving     Problem: Fluid Imbalance (Heart Failure)  Goal: Fluid Balance  Outcome: Improving     Problem: Bowel Elimination Impaired (Heart Transplant)  Goal: Effective Bowel Elimination  Outcome: Improving  Intervention: Enhance Bowel Motility and Elimination  Recent Flowsheet Documentation  Taken 5/26/2021 1600 by Lydia Vega, RN  Bowel Elimination Management:   toileting offered   pink lady enema  Taken 5/26/2021 0800 by Lydia Vega RN  Bowel Elimination Management:   toileting offered   suppository given    Pt Aaox4 lethargic most of the day but aroused easily, SR-ST, RA c/o sob with exertion, +2 javier lower ext, x2 Xxlarge BM, denied any N/v NG tube clamp @10pm. RR called=latic 2.4. 1of 2 PRBC initiated. Receiving zosyn IV Tacro@5.5ml/hr infusing.No S/s of distress, pt states\" I feel so much better today then I did yesterday\". Denies any pain, CB and phone in reach. Will continue to monitor.   .Lydia Vega RN       "

## 2021-05-28 ENCOUNTER — APPOINTMENT (OUTPATIENT)
Dept: PHYSICAL THERAPY | Facility: CLINIC | Age: 66
DRG: 001 | End: 2021-05-28
Attending: INTERNAL MEDICINE
Payer: COMMERCIAL

## 2021-05-28 ENCOUNTER — APPOINTMENT (OUTPATIENT)
Dept: OCCUPATIONAL THERAPY | Facility: CLINIC | Age: 66
DRG: 001 | End: 2021-05-28
Attending: INTERNAL MEDICINE
Payer: COMMERCIAL

## 2021-05-28 LAB
ANION GAP SERPL CALCULATED.3IONS-SCNC: 6 MMOL/L (ref 3–14)
ANION GAP SERPL CALCULATED.3IONS-SCNC: 7 MMOL/L (ref 3–14)
BUN SERPL-MCNC: 34 MG/DL (ref 7–30)
BUN SERPL-MCNC: 36 MG/DL (ref 7–30)
CALCIUM SERPL-MCNC: 8.2 MG/DL (ref 8.5–10.1)
CALCIUM SERPL-MCNC: 8.5 MG/DL (ref 8.5–10.1)
CHLORIDE SERPL-SCNC: 101 MMOL/L (ref 94–109)
CHLORIDE SERPL-SCNC: 102 MMOL/L (ref 94–109)
CO2 SERPL-SCNC: 28 MMOL/L (ref 20–32)
CO2 SERPL-SCNC: 29 MMOL/L (ref 20–32)
CREAT SERPL-MCNC: 1.58 MG/DL (ref 0.66–1.25)
CREAT SERPL-MCNC: 1.68 MG/DL (ref 0.66–1.25)
ERYTHROCYTE [DISTWIDTH] IN BLOOD BY AUTOMATED COUNT: 16.6 % (ref 10–15)
GFR SERPL CREATININE-BSD FRML MDRD: 42 ML/MIN/{1.73_M2}
GFR SERPL CREATININE-BSD FRML MDRD: 46 ML/MIN/{1.73_M2}
GLUCOSE BLDC GLUCOMTR-MCNC: 144 MG/DL (ref 70–99)
GLUCOSE BLDC GLUCOMTR-MCNC: 184 MG/DL (ref 70–99)
GLUCOSE BLDC GLUCOMTR-MCNC: 203 MG/DL (ref 70–99)
GLUCOSE BLDC GLUCOMTR-MCNC: 211 MG/DL (ref 70–99)
GLUCOSE BLDC GLUCOMTR-MCNC: 212 MG/DL (ref 70–99)
GLUCOSE SERPL-MCNC: 197 MG/DL (ref 70–99)
GLUCOSE SERPL-MCNC: 230 MG/DL (ref 70–99)
HCT VFR BLD AUTO: 28.2 % (ref 40–53)
HGB BLD-MCNC: 8.8 G/DL (ref 13.3–17.7)
LAB SCANNED RESULT: NORMAL
LDH SERPL L TO P-CCNC: 279 U/L (ref 85–227)
MAGNESIUM SERPL-MCNC: 1.7 MG/DL (ref 1.6–2.3)
MAGNESIUM SERPL-MCNC: 2 MG/DL (ref 1.6–2.3)
MCH RBC QN AUTO: 29.6 PG (ref 26.5–33)
MCHC RBC AUTO-ENTMCNC: 31.2 G/DL (ref 31.5–36.5)
MCV RBC AUTO: 95 FL (ref 78–100)
PLATELET # BLD AUTO: 125 10E9/L (ref 150–450)
POTASSIUM SERPL-SCNC: 3.1 MMOL/L (ref 3.4–5.3)
POTASSIUM SERPL-SCNC: 3.5 MMOL/L (ref 3.4–5.3)
RBC # BLD AUTO: 2.97 10E12/L (ref 4.4–5.9)
SODIUM SERPL-SCNC: 135 MMOL/L (ref 133–144)
SODIUM SERPL-SCNC: 138 MMOL/L (ref 133–144)
TACROLIMUS BLD-MCNC: 14.2 UG/L (ref 5–15)
TME LAST DOSE: NORMAL H
WBC # BLD AUTO: 10.2 10E9/L (ref 4–11)

## 2021-05-28 PROCEDURE — 258N000003 HC RX IP 258 OP 636: Performed by: NURSE PRACTITIONER

## 2021-05-28 PROCEDURE — 36592 COLLECT BLOOD FROM PICC: CPT | Performed by: PHYSICIAN ASSISTANT

## 2021-05-28 PROCEDURE — 250N000013 HC RX MED GY IP 250 OP 250 PS 637: Performed by: STUDENT IN AN ORGANIZED HEALTH CARE EDUCATION/TRAINING PROGRAM

## 2021-05-28 PROCEDURE — 94640 AIRWAY INHALATION TREATMENT: CPT

## 2021-05-28 PROCEDURE — 80048 BASIC METABOLIC PNL TOTAL CA: CPT | Performed by: PHYSICIAN ASSISTANT

## 2021-05-28 PROCEDURE — 83735 ASSAY OF MAGNESIUM: CPT | Performed by: PHYSICIAN ASSISTANT

## 2021-05-28 PROCEDURE — 80197 ASSAY OF TACROLIMUS: CPT | Performed by: PHYSICIAN ASSISTANT

## 2021-05-28 PROCEDURE — 250N000011 HC RX IP 250 OP 636: Performed by: SURGERY

## 2021-05-28 PROCEDURE — 97530 THERAPEUTIC ACTIVITIES: CPT | Mod: GP

## 2021-05-28 PROCEDURE — 999N001017 HC STATISTIC GLUCOSE BY METER IP

## 2021-05-28 PROCEDURE — 99233 SBSQ HOSP IP/OBS HIGH 50: CPT | Mod: GC | Performed by: INTERNAL MEDICINE

## 2021-05-28 PROCEDURE — 250N000013 HC RX MED GY IP 250 OP 250 PS 637: Performed by: NURSE PRACTITIONER

## 2021-05-28 PROCEDURE — 97530 THERAPEUTIC ACTIVITIES: CPT | Mod: GO | Performed by: OCCUPATIONAL THERAPIST

## 2021-05-28 PROCEDURE — 83615 LACTATE (LD) (LDH) ENZYME: CPT | Performed by: PHYSICIAN ASSISTANT

## 2021-05-28 PROCEDURE — 250N000009 HC RX 250: Performed by: NURSE PRACTITIONER

## 2021-05-28 PROCEDURE — 97110 THERAPEUTIC EXERCISES: CPT | Mod: GO | Performed by: OCCUPATIONAL THERAPIST

## 2021-05-28 PROCEDURE — 250N000011 HC RX IP 250 OP 636: Performed by: PHYSICIAN ASSISTANT

## 2021-05-28 PROCEDURE — 250N000013 HC RX MED GY IP 250 OP 250 PS 637: Performed by: PHYSICIAN ASSISTANT

## 2021-05-28 PROCEDURE — 250N000011 HC RX IP 250 OP 636

## 2021-05-28 PROCEDURE — 250N000013 HC RX MED GY IP 250 OP 250 PS 637

## 2021-05-28 PROCEDURE — 250N000011 HC RX IP 250 OP 636: Performed by: NURSE PRACTITIONER

## 2021-05-28 PROCEDURE — 84132 ASSAY OF SERUM POTASSIUM: CPT | Performed by: PHYSICIAN ASSISTANT

## 2021-05-28 PROCEDURE — 250N000012 HC RX MED GY IP 250 OP 636 PS 637: Performed by: PHYSICIAN ASSISTANT

## 2021-05-28 PROCEDURE — 94640 AIRWAY INHALATION TREATMENT: CPT | Mod: 76

## 2021-05-28 PROCEDURE — 85027 COMPLETE CBC AUTOMATED: CPT | Performed by: PHYSICIAN ASSISTANT

## 2021-05-28 PROCEDURE — 99232 SBSQ HOSP IP/OBS MODERATE 35: CPT | Performed by: PHYSICIAN ASSISTANT

## 2021-05-28 PROCEDURE — 250N000013 HC RX MED GY IP 250 OP 250 PS 637: Performed by: SURGERY

## 2021-05-28 PROCEDURE — 250N000009 HC RX 250: Performed by: PHYSICIAN ASSISTANT

## 2021-05-28 PROCEDURE — 97116 GAIT TRAINING THERAPY: CPT | Mod: GP

## 2021-05-28 PROCEDURE — 214N000001 HC R&B CCU UMMC

## 2021-05-28 RX ORDER — POTASSIUM CHLORIDE 750 MG/1
20 TABLET, EXTENDED RELEASE ORAL ONCE
Status: DISCONTINUED | OUTPATIENT
Start: 2021-05-28 | End: 2021-05-28 | Stop reason: CLARIF

## 2021-05-28 RX ORDER — POTASSIUM CHLORIDE 750 MG/1
20 TABLET, EXTENDED RELEASE ORAL ONCE
Status: COMPLETED | OUTPATIENT
Start: 2021-05-28 | End: 2021-05-28

## 2021-05-28 RX ORDER — PREDNISONE 20 MG/1
20 TABLET ORAL DAILY
Status: DISCONTINUED | OUTPATIENT
Start: 2021-05-29 | End: 2021-05-31 | Stop reason: HOSPADM

## 2021-05-28 RX ORDER — BUMETANIDE 0.25 MG/ML
3 INJECTION INTRAMUSCULAR; INTRAVENOUS 2 TIMES DAILY
Status: DISCONTINUED | OUTPATIENT
Start: 2021-05-28 | End: 2021-05-29

## 2021-05-28 RX ORDER — MAGNESIUM SULFATE HEPTAHYDRATE 40 MG/ML
2 INJECTION, SOLUTION INTRAVENOUS ONCE
Status: COMPLETED | OUTPATIENT
Start: 2021-05-28 | End: 2021-05-28

## 2021-05-28 RX ORDER — TACROLIMUS 1 MG/1
4 CAPSULE ORAL
Status: DISCONTINUED | OUTPATIENT
Start: 2021-05-28 | End: 2021-05-31 | Stop reason: HOSPADM

## 2021-05-28 RX ORDER — FUROSEMIDE 10 MG/ML
20 INJECTION INTRAMUSCULAR; INTRAVENOUS ONCE
Status: COMPLETED | OUTPATIENT
Start: 2021-05-28 | End: 2021-05-28

## 2021-05-28 RX ORDER — MYCOPHENOLATE MOFETIL 250 MG/1
1000 CAPSULE ORAL 2 TIMES DAILY
Status: DISCONTINUED | OUTPATIENT
Start: 2021-05-28 | End: 2021-05-28

## 2021-05-28 RX ORDER — SULFAMETHOXAZOLE AND TRIMETHOPRIM 400; 80 MG/1; MG/1
1 TABLET ORAL EVERY OTHER DAY
Status: DISCONTINUED | OUTPATIENT
Start: 2021-05-29 | End: 2021-05-28

## 2021-05-28 RX ORDER — MYCOPHENOLATE MOFETIL 250 MG/1
1000 CAPSULE ORAL
Status: DISCONTINUED | OUTPATIENT
Start: 2021-05-28 | End: 2021-05-30

## 2021-05-28 RX ADMIN — MYCOPHENOLATE MOFETIL 1000 MG: 500 INJECTION, POWDER, LYOPHILIZED, FOR SOLUTION INTRAVENOUS at 09:58

## 2021-05-28 RX ADMIN — MYCOPHENOLATE MOFETIL 1000 MG: 250 CAPSULE ORAL at 19:29

## 2021-05-28 RX ADMIN — ALBUTEROL SULFATE 2.5 MG: 2.5 SOLUTION RESPIRATORY (INHALATION) at 08:26

## 2021-05-28 RX ADMIN — PREDNISONE 15 MG: 5 TABLET ORAL at 19:30

## 2021-05-28 RX ADMIN — ACETAMINOPHEN 975 MG: 325 TABLET, FILM COATED ORAL at 23:44

## 2021-05-28 RX ADMIN — PIPERACILLIN AND TAZOBACTAM 3.38 G: 3; .375 INJECTION, POWDER, LYOPHILIZED, FOR SOLUTION INTRAVENOUS at 03:02

## 2021-05-28 RX ADMIN — TACROLIMUS 4 MG: 1 CAPSULE ORAL at 19:30

## 2021-05-28 RX ADMIN — Medication 5 ML: at 14:33

## 2021-05-28 RX ADMIN — HEPARIN SODIUM 5000 UNITS: 5000 INJECTION, SOLUTION INTRAVENOUS; SUBCUTANEOUS at 16:16

## 2021-05-28 RX ADMIN — ROSUVASTATIN CALCIUM 10 MG: 10 TABLET, FILM COATED ORAL at 08:48

## 2021-05-28 RX ADMIN — INSULIN ASPART 10 UNITS: 100 INJECTION, SOLUTION INTRAVENOUS; SUBCUTANEOUS at 09:11

## 2021-05-28 RX ADMIN — MAGNESIUM SULFATE IN WATER 2 G: 40 INJECTION, SOLUTION INTRAVENOUS at 16:19

## 2021-05-28 RX ADMIN — ALBUTEROL SULFATE 2.5 MG: 2.5 SOLUTION RESPIRATORY (INHALATION) at 16:22

## 2021-05-28 RX ADMIN — NYSTATIN 1000000 UNITS: 500000 SUSPENSION ORAL at 19:45

## 2021-05-28 RX ADMIN — BUPROPION HYDROCHLORIDE 75 MG: 75 TABLET, FILM COATED ORAL at 08:49

## 2021-05-28 RX ADMIN — HEPARIN SODIUM 5000 UNITS: 5000 INJECTION, SOLUTION INTRAVENOUS; SUBCUTANEOUS at 08:51

## 2021-05-28 RX ADMIN — INSULIN ASPART 4 UNITS: 100 INJECTION, SOLUTION INTRAVENOUS; SUBCUTANEOUS at 11:43

## 2021-05-28 RX ADMIN — Medication 1 TABLET: at 19:45

## 2021-05-28 RX ADMIN — NYSTATIN 1000000 UNITS: 500000 SUSPENSION ORAL at 08:48

## 2021-05-28 RX ADMIN — BUMETANIDE 3 MG: 0.25 INJECTION, SOLUTION INTRAMUSCULAR; INTRAVENOUS at 14:32

## 2021-05-28 RX ADMIN — Medication 10 MG: at 21:32

## 2021-05-28 RX ADMIN — HEPARIN SODIUM 5000 UNITS: 5000 INJECTION, SOLUTION INTRAVENOUS; SUBCUTANEOUS at 23:45

## 2021-05-28 RX ADMIN — NYSTATIN 1000000 UNITS: 500000 SUSPENSION ORAL at 12:18

## 2021-05-28 RX ADMIN — MONTELUKAST 10 MG: 10 TABLET, FILM COATED ORAL at 21:22

## 2021-05-28 RX ADMIN — POTASSIUM CHLORIDE 20 MEQ: 750 TABLET, EXTENDED RELEASE ORAL at 09:14

## 2021-05-28 RX ADMIN — ASPIRIN 81 MG CHEWABLE TABLET 81 MG: 81 TABLET CHEWABLE at 08:49

## 2021-05-28 RX ADMIN — ALBUTEROL SULFATE 2.5 MG: 2.5 SOLUTION RESPIRATORY (INHALATION) at 12:32

## 2021-05-28 RX ADMIN — PANTOPRAZOLE SODIUM 40 MG: 40 TABLET, DELAYED RELEASE ORAL at 19:45

## 2021-05-28 RX ADMIN — Medication 50 MG: at 19:45

## 2021-05-28 RX ADMIN — BUMETANIDE 3 MG: 0.25 INJECTION, SOLUTION INTRAMUSCULAR; INTRAVENOUS at 11:17

## 2021-05-28 RX ADMIN — UMECLIDINIUM 1 PUFF: 62.5 AEROSOL, POWDER ORAL at 08:48

## 2021-05-28 RX ADMIN — GABAPENTIN 300 MG: 300 CAPSULE ORAL at 08:48

## 2021-05-28 RX ADMIN — Medication 37.5 MG: at 21:33

## 2021-05-28 RX ADMIN — METHYLPREDNISOLONE SODIUM SUCCINATE 16 MG: 40 INJECTION, POWDER, FOR SOLUTION INTRAMUSCULAR; INTRAVENOUS at 09:01

## 2021-05-28 RX ADMIN — BUPROPION HYDROCHLORIDE 75 MG: 75 TABLET, FILM COATED ORAL at 14:33

## 2021-05-28 RX ADMIN — VALGANCICLOVIR 450 MG: 450 TABLET, FILM COATED ORAL at 08:49

## 2021-05-28 RX ADMIN — PIPERACILLIN AND TAZOBACTAM 3.38 G: 3; .375 INJECTION, POWDER, LYOPHILIZED, FOR SOLUTION INTRAVENOUS at 21:23

## 2021-05-28 RX ADMIN — FLUTICASONE FUROATE AND VILANTEROL TRIFENATATE 1 PUFF: 100; 25 POWDER RESPIRATORY (INHALATION) at 08:48

## 2021-05-28 RX ADMIN — ALLOPURINOL 300 MG: 300 TABLET ORAL at 08:48

## 2021-05-28 RX ADMIN — PIPERACILLIN AND TAZOBACTAM 3.38 G: 3; .375 INJECTION, POWDER, LYOPHILIZED, FOR SOLUTION INTRAVENOUS at 09:29

## 2021-05-28 RX ADMIN — NYSTATIN 1000000 UNITS: 500000 SUSPENSION ORAL at 16:16

## 2021-05-28 RX ADMIN — ACETAMINOPHEN 975 MG: 325 TABLET, FILM COATED ORAL at 08:49

## 2021-05-28 RX ADMIN — GABAPENTIN 600 MG: 300 CAPSULE ORAL at 14:33

## 2021-05-28 RX ADMIN — FUROSEMIDE 20 MG: 10 INJECTION, SOLUTION INTRAMUSCULAR; INTRAVENOUS at 01:14

## 2021-05-28 RX ADMIN — PANTOPRAZOLE SODIUM 40 MG: 40 TABLET, DELAYED RELEASE ORAL at 08:49

## 2021-05-28 RX ADMIN — ACETAMINOPHEN 975 MG: 325 TABLET, FILM COATED ORAL at 16:16

## 2021-05-28 RX ADMIN — GABAPENTIN 600 MG: 300 CAPSULE ORAL at 21:22

## 2021-05-28 RX ADMIN — PIPERACILLIN AND TAZOBACTAM 3.38 G: 3; .375 INJECTION, POWDER, LYOPHILIZED, FOR SOLUTION INTRAVENOUS at 14:35

## 2021-05-28 RX ADMIN — LABETALOL HYDROCHLORIDE 20 MG: 5 INJECTION, SOLUTION INTRAVENOUS at 00:11

## 2021-05-28 RX ADMIN — Medication 1 TABLET: at 08:49

## 2021-05-28 RX ADMIN — SODIUM CHLORIDE, PRESERVATIVE FREE 5 ML: 5 INJECTION INTRAVENOUS at 05:28

## 2021-05-28 ASSESSMENT — ACTIVITIES OF DAILY LIVING (ADL)
ADLS_ACUITY_SCORE: 15
ADLS_ACUITY_SCORE: 13
ADLS_ACUITY_SCORE: 12
ADLS_ACUITY_SCORE: 14
ADLS_ACUITY_SCORE: 12
ADLS_ACUITY_SCORE: 15

## 2021-05-28 ASSESSMENT — MIFFLIN-ST. JEOR: SCORE: 1761.06

## 2021-05-28 NOTE — PLAN OF CARE
D: S/p OHT on 5/6/21. Hx of NICM (EF 20%) c/b VT s/p CRT-D s/p HMII LVAD on 6/19/2017, was originally intended as DT 2/2 obesity however with recent weight loss pt was a candidate for transplantation.    I: Monitored vitals and assessed pt status.   Running: Tacrolimus @ 110mcg/hr   PRN: dilaudid 0.2mg IV x1 for pain    A: A0x4. VSS. Afebrile on RA. O2: 96-94%. SR/ST with rare PVCs on tele. Urinating adequately. LBM 5/27. Pt reported loose stools with second BM. Assist x1 with GB and walker. NG tube was removed today. Pt was NPO for RH cath today. Pt was tolerating clear liquid diet well post RH cath. BG ACHS. Pt has Novolog sliding scale and carb coverage-only gave sliding scale during shift as pt was NPO for majority of shift. BGs were in the 200s. Gave IV bumex x1.    P: Continue to monitor Pt status and report changes to treatment team.

## 2021-05-28 NOTE — PROGRESS NOTES
"D:No complaints of pain.   Reports still feeling \"weak\", but it's better.   Up in chair most of shift.   Napped after afternoon therapy.   Lungs are clear,  Diminished in the lower lobes.   No cough noted.   Encouraged to use incentive spirometer.   Indicated that he has been using it.  No  Shortness of breatth.  O2 sat 96% on room  Air.   Appetite is fair.  Ate solid for breakfast and a malt at noon.   Continue to have loose stools from bowel laxatives.   Temp 97.5  HR 98,  NSR with no ectopy.  Bp 100/65  MAP 72  RR 20.      A:Is doing well.   Is physically comfortable.   Has scheduled extra strength tylenol.  AVSS.   Breathing easy with normal sats on room air.   Has scheduled neb treatments.   Condition is stable.   Is hoping to discharge to home on Sunday.    P:Continue to assess status.  Monitor rhythm, temp and vital signs and O2 sats..   OOB to chair for meals.  Notify MD with any temp spike or abnormal rhythm or vital signs.  "

## 2021-05-28 NOTE — PROGRESS NOTES
[facility]  Transplant Infectious Disease Progress Note     Patient:  Jim Willingham, Date of birth 1955, Medical record number [id number]  Date of Visit:  05/28/2021         Assessment and Recommendations:   Recommendations:     Continue zosyn for now.  Patient now with 7 days of abx (levaquin and zosyn).  Can do empiric 10 day course given his improvement  Agree with pulmonary consult for good chest therapy and pulmonary toilet  Follow up results of below tests   - AFB blood culture  - Histoplasma urine Ag    Regarding PJP prophylaxis, patient has been on dapsone due to earlier renal dysfunction.  His G6 PD level returned mildly low, which was performed due to ongoing anemia.  Should check hemolysis labs, but with his renal function improving, can switch to single strength bactrim every other day, eventually transitioning to daily when kidneys improve further.    Thank you very much for this consultation. Transplant Infectious Disease will chart review over the weekend but certainly call if questions or concerns.     Assessment:     Jim Willingham is a 64 yo M who underwent orthotopic heart transplantation 5/6/21, previously had a HeartMate II LVAD for non ischemic cardiomyopathy.  Transplant ID has been consulted for potential aspiration vs HCAP pneumonia.     Patient has had persistent leukocytosis since transplant, largely due to steroids.  In the last several days his WBC has been trending up.  He has had chest imaging that shows a slightly elevated left sided hemidiaphragm with some bilateral opacities and small bilateral pleural effusions.  Notable when right heart catheterizations have been performed he has been intravascularly euvolemic but has some third space edema from hypoalbuminemia.  A chest CT from 5/18 showed Consolidative opacity in the left lung base with groundglassopacities in both lung bases concerning for infection including atypical infection such as aspiration.   Chest CT 5/26 showing organizing pneumonia with increase bibasilar consolidation     For time being, can continue empiric antibiotics with zosyn for potential hospital acquired pneumonia, which was transitioned from levofloxacin.  Tests negative thus far include fungitel, galactomannin, cryptococcus ag, legionella urine Ag.  Histoplasma urine Ag and AFB testing pending.      Immunosuppression - currently on tacrolimus (goal 10-12) and cellcept 1500mg BID in addition to prolonged steroid taper (currently 20 AM, 15 PM).  He did receive basilixumab protocol     Infectious Disease issues include:  - Serostatus: CMV: D+/R+, EBV: D+/R+, Toxo D-/R-  - QTc interval: 449  - Pneumocystis prophylaxis: bactrim single strength every other day.  If renal function continues to improve, can go to daily.  - Viral serostatus & prophylaxis: Valcyte 450 mg  - Fungal prophylaxis: Nystatin  - Isolation status: Good hand hygiene.     Discussed with attending Dr. Rutherford.     Riza Montaño MD   Pager [phone]         Interval History:       Transplants:  5/6/2021 (Heart), Postoperative day:  22.  Coordinator Yolette Rueda    Patient feels much improved.  NG is out and he is eating.  Using incentive spirometer.         Current Medications & Allergies:       acetaminophen  975 mg Oral Q8H     albuterol  2.5 mg Nebulization 4x daily     allopurinol  300 mg Oral Daily     amitriptyline  37.5 mg Oral At Bedtime     aspirin  81 mg Oral Daily     bumetanide  3 mg Intravenous BID     buPROPion  75 mg Oral or Feeding Tube BID    And     buPROPion  50 mg Oral or Feeding Tube QPM     calcium citrate-vitamin D  1 tablet Oral BID     cyanocobalamin  1,000 mcg Intramuscular Q30 Days     fluticasone-vilanterol  1 puff Inhalation Daily     gabapentin  300 mg Oral Daily     gabapentin  600 mg Oral BID     heparin ANTICOAGULANT  5,000 Units Subcutaneous Q8H     heparin lock flush  5-10 mL Intracatheter Q24H     insulin aspart  1-10 Units  Subcutaneous TID AC     insulin aspart  1-7 Units Subcutaneous At Bedtime     insulin aspart   Subcutaneous TID w/meals     insulin glargine  15 Units Subcutaneous QAM AC     lidocaine  2 patch Transdermal Q24H     lidocaine   Transdermal Q8H     magnesium sulfate  2 g Intravenous Once     montelukast  10 mg Oral At Bedtime     mycophenolate  1,000 mg Oral BID IS     nystatin  1,000,000 Units Swish & Swallow 4x Daily     pantoprazole  40 mg Oral BID     piperacillin-tazobactam  3.375 g Intravenous Q6H     polyethylene glycol  17 g Oral BID     predniSONE  15 mg Oral Daily     [START ON 5/29/2021] predniSONE  20 mg Oral Daily     rosuvastatin  10 mg Oral Daily     scopolamine  1 patch Transdermal Q72H    And     scopolamine   Transdermal Q8H     senna-docusate  1 tablet Oral BID     sodium chloride (PF)  3 mL Intracatheter Q8H     [START ON 5/29/2021] sulfamethoxazole-trimethoprim  1 tablet Oral Every Other Day     umeclidinium  1 puff Inhalation Daily     valGANciclovir  450 mg Oral Daily       Infusions/Drips:    dextrose       - MEDICATION INSTRUCTIONS -       ACE/ARB/ARNI NOT PRESCRIBED       tacrolimus (Prograf) infusion ADULT 110 mcg/hr (05/27/21 2111)       Allergies   Allergen Reactions     Beer Shortness Of Breath     Grass Shortness Of Breath     Ace Inhibitors Cough     Dust Mites Other (See Comments)     Asthma     Mold Other (See Comments)     Asthma     Penicillins Other (See Comments)     Unknown - childhood exposure    Tolerated Zosyn 9/18-9/20/2020     Sulfa Drugs Other (See Comments) and Unknown     Unknown childhood reaction            Physical Exam:   Vitals were reviewed.  All vitals stable.  Patient Vitals for the past 24 hrs:   BP Temp Temp src Pulse Resp SpO2 Weight   05/28/21 1606 104/64 97.5  F (36.4  C) Oral 104 18 97 % --   05/28/21 1147 100/65 97.5  F (36.4  C) Axillary 98 20 96 % --   05/28/21 0838 115/75 97.7  F (36.5  C) Oral 109 20 99 % --   05/28/21 0402 -- -- -- -- -- -- 99.2 kg  (218 lb 9.6 oz)   05/28/21 0300 115/82 -- -- 88 16 95 % --   05/28/21 0100 (!) 140/95 -- -- -- -- -- --   05/28/21 0000 (!) 151/97 -- -- -- -- -- --   05/27/21 2348 (!) 152/95 97.5  F (36.4  C) -- 105 18 97 % --   05/27/21 2115 -- -- -- -- -- 96 % --   05/27/21 1933 124/75 96.7  F (35.9  C) Axillary 116 18 96 % --   05/27/21 1627 -- -- -- 105 20 96 % --     Ranges for vital signs:  Temp:  [96.7  F (35.9  C)-97.7  F (36.5  C)] 97.5  F (36.4  C)  Pulse:  [] 104  Resp:  [16-20] 18  BP: (100-152)/(64-97) 104/64  SpO2:  [95 %-99 %] 97 %  Vitals:    05/26/21 0600 05/27/21 0600 05/28/21 0402   Weight: 100.4 kg (221 lb 4.8 oz) 102.8 kg (226 lb 9.6 oz) 99.2 kg (218 lb 9.6 oz)     Physical Examination:  GENERAL:  well-developed, well-nourished, in bed with no acute distress.  HEAD:  Head is normocephalic, atraumatic.    EYES:  Eyes have anicteric sclerae without conjunctival injection   NECK:  Supple. No cervical lymphadenopathy  LUNGS:  trace inspiratory rales at the bases. Normal work of breathing on room air  CARDIOVASCULAR:  Tachycardic but normal rhythm with no murmurs, gallops or rubs.  ABDOMEN:  Bowel sounds present, non tender  SKIN:  Mild lower extremity edema  NEUROLOGIC:  Grossly nonfocal. Active x4 extremities         Laboratory Data:     No results found for: ACD4    Inflammatory Markers    Recent Labs   Lab Test 05/27/21  0449 05/26/21  0705 05/24/21  0544 05/20/21  0803 05/18/21  0746 05/17/21  0530 10/27/20  1250 10/27/20  1250 10/20/20  1305 10/13/20  1320 10/06/20  1320 09/30/20  1130 04/13/18  1437 04/13/18  1437   SED  --   --   --   --   --   --   --  10 8 10 20 9  --  18   .0* 220.0* 4.0 6.9 6.0 6.9   < > 3.5 11.0* 12.0* 5.7 0.6*   < > 9.4*    < > = values in this interval not displayed.       Immune Globulin Studies     Recent Labs   Lab Test 12/21/20  1133      IGM 55          Metabolic Studies       Recent Labs   Lab Test 05/28/21  0531 05/27/21  1840 05/26/21  7418  05/26/21  2324 05/26/21  1835 05/25/21  1627 05/25/21  1627 05/08/21  0848 05/08/21  0848 05/06/21  1535 05/06/21  1535 05/04/21 2313 05/04/21 2313    136   < >  --   --    < > 139   < >  --    < > 136   < > 135   POTASSIUM 3.5 3.7   < >  --   --    < > 4.4   < >  --    < > 5.2   < > 4.0   CHLORIDE 101 102   < >  --   --    < > 105   < >  --    < > 103   < > 101   CO2 29 28   < >  --   --    < > 25   < >  --    < > 25   < > 26   ANIONGAP 6 5   < >  --   --    < > 9   < >  --    < > 9   < > 7   BUN 36* 42*   < >  --   --    < > 53*   < >  --    < > 58*   < > 50*   CR 1.58* 1.76*   < >  --   --    < > 1.94*   < >  --    < > 2.22*   < > 1.60*   GFRESTIMATED 46* 40*   < >  --   --    < > 36*   < >  --    < > 30*   < > 45*   * 294*   < >  --   --    < > 154*   < >  --    < > 201*   < > 115*   A1C  --   --   --   --   --   --   --   --   --   --   --   --  5.1   QAMAR 8.5 8.2*   < >  --   --    < > 8.1*   < >  --    < > 8.5   < > 9.1   PHOS  --   --   --   --   --   --   --   --   --   --  5.7*   < > 3.8   MAG 1.7 1.9   < >  --   --    < > 2.0   < >  --    < > 3.0*   < > 2.3   LACT  --   --   --  1.6  --   --   --   --  1.0   < >  --    < >  --    PCAL  --   --   --   --  7.76*  --   --    < >  --   --   --   --   --    FGTL  --   --   --   --   --   --  <31  --   --   --   --   --   --     < > = values in this interval not displayed.       Hepatic Studies    Recent Labs   Lab Test 05/26/21  1340 05/24/21  0544 05/20/21  0803 05/10/21  0330 05/10/21  0330 04/30/21  0514 04/30/21  0514   BILITOTAL 0.6 0.8 0.5   < > 0.6   < >  --    DBIL 0.3* 0.3* 0.2   < > 0.3*   < >  --    ALKPHOS 123 147 102   < > 78   < >  --    PROTTOTAL 4.9* 5.5* 5.3*   < > 5.0*   < >  --    ALBUMIN 2.2* 2.8* 2.8*   < > 2.8*   < >  --    AST 26 28 28   < > 34   < >  --    ALT 52 58 46   < > 48   < >  --    LDH  --   --   --   --  386*  --  317*    < > = values in this interval not displayed.       Pancreatitis testing    Recent Labs   Lab  Test 05/26/21  1340 05/04/21  2313 11/05/20  0907 08/05/20  0335   AMYLASE 49 93  --   --    LIPASE 25*  --   --  168   TRIG  --   --  91  --        Gout Labs      Recent Labs   Lab Test 03/12/21  0605 02/01/21  1127 01/09/21  0538 12/21/20  1133 11/30/20  0808   URIC 4.8 3.9 5.0 4.5 3.9       Hematology Studies      Recent Labs   Lab Test 05/28/21  0531 05/27/21  0449 05/26/21  1441 05/26/21  1441 05/26/21  0705 05/26/21  0705 05/25/21  0453 05/24/21  0544 05/23/21  0542 05/17/21  0530 05/17/21  0530   WBC 10.2 11.7*  --   --   --  15.2* 17.2* 14.8* 12.4*   < > 18.7*   ANEU  --   --   --  12.1*  --   --   --   --   --   --  17.2*   ALYM  --   --   --  0.1*  --   --   --   --   --   --  0.6*   VIJAY  --   --   --  0.1  --   --   --   --   --   --  0.6   AEOS  --   --   --  0.0  --   --   --   --   --   --  0.0   HGB 8.8* 8.3*   < >  --    < > 7.2* 8.2* 8.2* 7.4*   < > 7.8*   HCT 28.2* 26.9*   < >  --   --  23.9* 27.5* 26.8* 23.7*   < > 24.5*   * 128*  --   --   --  153 187 220 240   < > 292    < > = values in this interval not displayed.       Clotting Studies    Recent Labs   Lab Test 05/12/21  0622 05/10/21  0330 05/07/21  0340 05/06/21  0650   INR 1.19* 1.33* 1.38* 1.45*   PTT  --   --   --  35       Iron Testing    Recent Labs   Lab Test 05/28/21  0531 05/13/21  0534 05/13/21  0534 02/09/21  0518 02/09/21  0518 01/21/21  1350 01/21/21  1350   IRON  --   --  38  --  28*  --  36   FEB  --   --  215*  --  285  --  381   IRONSAT  --   --  18  --  10*  --  10*   VITA  --   --  98  --  33  --   --    MCV 95   < > 94   < >  --    < >  --    FOLIC  --   --  14.4  --   --   --   --    B12  --   --  734  --   --    < >  --     < > = values in this interval not displayed.       Arterial Blood Gas Testing    Recent Labs   Lab Test 05/09/21  0956 05/09/21  0902 05/09/21  0344 05/08/21 2004 05/07/21  1900 05/07/21  1900 05/07/21  1700 05/07/21  1700 05/07/21  1420 05/07/21  0340 05/07/21  0340 05/06/21  2152   PH  --    --   --   --   --  7.40  --  7.38 7.39  --  7.38 7.37   PCO2  --   --   --   --   --  38  --  41 40  --  41 44   PO2  --   --   --   --   --  101  --  102 130*  --  153* 148*   HCO3  --   --   --   --   --  23  --  24 24  --  24 25   O2PER 21  21 REPORT AMENDED: 21.0 REPORT AMENDED:   < > 2LNC   < > Canceled, Test credited  2L 40   < > 40.0  40.0 40  40    < > = values in this interval not displayed.        Thyroid Studies     Recent Labs   Lab Test 02/01/21  1127 12/18/20  1018 06/24/20  0747 07/31/19  1050 07/03/19  0828   TSH 3.52 3.45 1.30 1.23 0.82       Urine Studies     Recent Labs   Lab Test 05/25/21  1230 05/16/21  0528 05/15/21  1730 05/07/21  0804 05/05/21  0515 03/08/21  1525   URINEPH 5.5 5.5 Unsatisfactory specimen - collected in wrong container 5.0 6.0 6.0   NITRITE Negative Negative Unsatisfactory specimen - collected in wrong container Negative Negative Negative   LEUKEST Negative Negative Unsatisfactory specimen - collected in wrong container Negative Negative Negative   WBCU 4 1  --  8* 0 0       CSF testing   No lab results found.    Invalid input(s): CADAM, EVPCR, ENTPCR, ENTEROVIRUS    Medication levels    Recent Labs   Lab Test 05/28/21  0531 05/08/21 2004 05/08/21 2004   VANCOMYCIN  --   --  18.5   TACROL 14.2   < >  --     < > = values in this interval not displayed.       Body fluid stats    Recent Labs   Lab Test 05/25/21  1015   GS <10 Squamous epithelial cells/low power field  >25 PMNs/low power field  Many  Mixed gram negative and positive jaxon         Microbiology:  Fungal testing  Recent Labs   Lab Test 05/25/21  1627   FGTL <31   ASPGAI 0.08   ASPGAA Negative       Last Culture results with specimen source  Culture Micro   Date Value Ref Range Status   05/26/2021 No growth after 2 days  Preliminary   05/26/2021 No growth after 2 days  Preliminary   05/26/2021 No acid fast bacilli isolated after 2 days  Preliminary   05/25/2021 Heavy growth  Normal jaxon    Final   05/17/2021  No growth  Final   05/17/2021 No growth  Final   05/15/2021 No growth  Final   11/04/2020 No growth  Final   09/23/2020 No growth  Final   09/22/2020 No growth  Final   09/21/2020 No growth  Final   09/20/2020 No growth  Final   09/19/2020 No growth  Final   09/18/2020 (A)  Final    Cultured on the 2nd day of incubation:  Staphylococcus hominis  Susceptibility testing done on previous specimen     09/18/2020   Final    Critical Value/Significant Value, preliminary result only, called to and read back by  Tamy Leventhal RN [id number] 9/20/20 AM     09/18/2020 No growth  Final    Specimen Description   Date Value Ref Range Status   05/26/2021 Blood PURPLE PORT  Final   05/26/2021 Blood Left Hand  Final   05/26/2021 Blood PURPLE PORT  Final   05/26/2021 Blood  Final   05/25/2021 Urine  Final   05/25/2021 Sputum  Final   05/25/2021 Sputum  Final   05/17/2021 Blood  Final   05/17/2021 Blood  Final   05/15/2021 Blood PURPLE PORT  Final   05/06/2021 Nares  Final   11/04/2020 Blood Unspecified Site  Final   09/23/2020 Blood Left Arm  Final   09/22/2020 Blood Left Arm  Final   09/21/2020 Blood Left Arm  Final   09/20/2020 Blood Left Arm  Final        Last check of C difficile  No results found for: CDBPCT    Infection Studies to assess Diarrhea No lab results found.    Virology:  Coronavirus-19 testing    Recent Labs   Lab Test 05/20/21  0640 05/12/21  1000 05/04/21  2357 04/23/21  2055 01/04/21  0947 01/04/21  0947 09/17/20  1600 08/05/20  0938 08/01/20  1126   [id number] Nasopharyngeal Nasopharyngeal Nasopharyngeal Nasopharyngeal   < > Nasopharyngeal Nasopharyngeal Nasopharyngeal  --    SARSCOVRES NEGATIVE NEGATIVE NEGATIVE NEGATIVE   < > NEGATIVE NEGATIVE NEGATIVE  --    [id number]  --   --   --   --   --  Nasopharyngeal Nasopharyngeal Nasopharyngeal Nasopharyngeal   [id number]  --   --   --   --   --  Test received-See reflex to IDDL test SARS CoV2 (COVID-19) Virus RT-PCR Test received-See reflex to IDDL test SARS CoV2  (COVID-19) Virus RT-PCR Test received-See reflex to IDDL test SARS CoV2 (COVID-19) Virus RT-PCR Not Detected    < > = values in this interval not displayed.       Respiratory virus (non-coronavirus-19) testing    Recent Labs   Lab Test 03/05/21  1655 02/28/21  1020 02/12/21  0915 01/15/20  2210   AFLU  --   --   --  Negative   IFLUA Not Detected Not Detected Not Detected Not Detected   INFZA  --  Negative  --   --    FLUAH1 Not Detected Not Detected Not Detected Not Detected   [id number] Not Detected Not Detected Not Detected Not Detected   FLUAH3 Not Detected Not Detected Not Detected Not Detected   BFLU  --   --   --  Positive*   IFLUB Not Detected Not Detected Not Detected Detected, Abnormal Result*   INFZB  --  Negative  --   --    PIV1 Not Detected Not Detected Not Detected Not Detected   PIV2 Not Detected Not Detected Not Detected Not Detected   PIV3 Not Detected Not Detected Not Detected Not Detected   PIV4 Not Detected Not Detected Not Detected Not Detected   RSVA Not Detected Not Detected Not Detected Not Detected   RSVB Not Detected Not Detected Not Detected Not Detected   HMPV Not Detected Not Detected Not Detected Not Detected   ADENOV Not Detected Not Detected Not Detected Not Detected   CORONA Not Detected Not Detected Not Detected Not Detected       No results found for: CMVLOG    No results found for: H6RES    No results found for: EBRES    No results found for: 91563, BKRES    Parvovirus Testing  No lab results found.    Invalid input(s): PRVRES    Adenovirus Testing  No lab results found.    Invalid input(s): ADENAB, ADAG, ADENOVIRUS, ADQT    Imaging:  Recent Results (from the past 48 hour(s))   Cardiac Catheterization    Narrative      Successful endomyocardial biopsy. Results pending.    Right sided filling pressures are mildly elevated.    Moderately elevated pulmonary artery hypertension.    Left sided filling pressures are moderately elevated.    Normal cardiac output level.

## 2021-05-28 NOTE — PROGRESS NOTES
CLINICAL NUTRITION SERVICES     Nutrition Prescription    RECOMMENDATIONS FOR MDs/PROVIDERS TO ORDER:  See prior RD notes    Malnutrition Status:    See prior RD notes    Recommendations already ordered by Registered Dietitian (RD):  Modified snacks    Future/Additional Recommendations:  See prior RD notes     Diet: Regular since 5/27. Prn snack/supplement order. NGT discontinued 5/27.   Intake: Ordered breakfast today and tolerated. Feels well.     INTERVENTIONS:  Implementation:  Medical food supplement therapy: Scheduled [employer] bars at 10:00 and 14:00 as per pt preference.   Modify composition of meals/snacks: Offered to schedule his am breakfast. Pt politely declined the need at this time. Entered his preferred breakfast into [employer] (meal ordering system) in the event he wants scheduled in the future.     Follow up/Monitoring:  Will continue to follow pt.    Sana Costello, MS, RD, LD, [facility]   6C Pgr: [phone]

## 2021-05-28 NOTE — PROGRESS NOTES
[facility]   Cardiology II Service / Advanced Heart Failure  Daily Progress Note      Patient: Jim Willingham  MRN: [id number]  Admission Date: 2/8/2021  Hospital Day # 109    Assessment and Plan: Jim Willingham is a 65 year old male with history of NICM EF 20% c/b VT s/p CRT-D s/p HMII LVAD 6/19/2017 originally intended as destination therapy 2/2 obesity however with recent weight loss now candidate for transplantation. He is admitted for decompensated heart failure with subsequent s/p OHT 5/6/21. Course has been complicated by pneumonia.    Today's Plan:  - Bumex 3 mg IV BID today, goal net negative 2L daily. Consider transition to POs in 1-2 days.  - F/u 5/27 biopsy, pending  - Continue Zosyn for now  - Appreciate transplant ID consult  - Will convert tacrolimus, steroids, and cellcept back to PO given improved Po intake  - Will discuss pleural effusions with ID and CVTS, given pneumonia does this need drainage  - F/u with ID re dapsone  - Due for weekly COVID test  - Addendum: Sending LDH and haptoglobin  - Addendum: could consider increasing MMF if he continues his rapid improvement (Was decreased in the setting of infection)    S/p OHT.   5/6/21 secondary to NICM w/history of HM3. Echo 5/14/21 conistent with EF 65-70%, mildly decreased RV function, and dilated IVC. RHC 5/27/21 with mRA-11, mPA-33, mPCWP-23, MICHELLE CO-6.62, and MICHELLE CI-3.09. DSA negative 5/13/21.     Immunosuppression:   - Simulect induction 5/6 and 5/10.   - Will change tac gtt -> PO today, recheck level in AM  - Cellcept PO 1000 mg BID (Reduced given ongoing infection)  - Prednisone 20 mg in the AM and 15 mg in the PM (reduced for 5/20 biopsy, 5/28 biopsy pending)     Serostatus: CMV: D+/R+, EBV: D+/R+, Toxo D-/R-    Prophylaxis:   - HOLDING Dapsone pending ID rects for PJP ppx- he has a mildly decreased G6PD level  - Continue Nystatin  - Continue valcyte.   - Continue Crestor and ASA     Graft:  - Bumex 3 mg IV today, consider  transition to POs in 1-2 days  - 5/28 biopsy pending  - Next RHC and biopsy due     Leukocytosis.   Pneumonia  CT Chest positive for left lung base consolidation vs atelectasis. Initially abx held given improving leukocytosis off antimicrobials and no symptoms. Levaquin initiated with concern for HCAP when leukocytosis was worsening, broadened to zosyn. Repeat CT with worsened ground glass opacities from prior Small R>L pleural effusions. Additionally, there had been concern for abdominal component d/t vomitting Sun/Mon, but reassuring CT. Fungitell 5/25 ngative. Aspergillus ag negative.  - Appreciate transplant ID consult  - Pulm consult for consideration of bronch-> Defered given ongoing improvement  - Trend blood cultures: NGTD  - Sputum culture- heave growth, normal jaxon, CTM  - Will discuss need for thoracentesis of the right pleural effusion with ID and CVTS  - Continue Zosyn for now     Nausea with Emesis. Dysphagia with Dyspepsia. History of Gastric bypass surgery. Abd X-ray with improved stool burden 5/24. Large BM with persistent nausea. Gastrogafin study negative for obstruction. CT abdomen/pelvis without obvious source of abdominal pathology. LFTS and amylase/lipase WNL. Clinically improved and tolerating PO intake. NG tube removed.  - Changing immunosuppression back to PO  - Aggressive bowel regimen per CVTS.     Anemia, normocytic. Required 2UPRBC on 5/26. Mildly decreased G6PD level  - Holding dapsone while awaiting ID recs  - Daily Hgb   - F/u LDH     Right pleural tube air leak, resolved.   - Chest tubes removed    Didi Butler PA-C  Advanced Heart Failure/Cardiology II Service  Pager [phone] [facility] [phone]    ================================================================    Subjective/24-Hr Events:   Last 24 hr care team notes reviewed. Feeling much better today. Slept well last night. More energy. Has been up walking around the room. Looking forward to therapy visits today. No  "fevers/chills. Still feeling SOB, but greatly improved. No abdominal pain, nausea, vomiting or diarrhea. Tolerated toast for breakfast today. Some LE edema, unchanged from yesterday.    ROS:  4 point ROS including respiratory, CV, GI and  (other than that noted in the HPI) is negative.     Medications: Reviewed in EPIC.     Physical Exam:   /65 (BP Location: Left arm)   Pulse 98   Temp 97.5  F (36.4  C) (Axillary)   Resp 20   Ht 1.727 m (5' 8\")   Wt 99.2 kg (218 lb 9.6 oz)   SpO2 96%   BMI 33.24 kg/m      GENERAL: Appears comfortable, in no distress, significant improvement from yesterday.  HEENT: Eye symmetrical, no discharge or icterus bilaterally. Mucous membranes moist and without lesions.  NECK: Supple, JVD ~10.   CV: Tachycardic, regular, +S1S2, no murmur, rub, or gallop.   RESPIRATORY: Respirations regular, even, and unlabored. Soft rhonchi at left lower lung base, unchanged from yesterday's exam  GI: Soft and non distended with normoactive bowel sounds present in all quadrants. No tenderness, rebound, guarding.   EXTREMITIES: Trace-1+ peripheral edema. 2+ bilateral pedal pulses.   NEUROLOGIC: Alert and interacting appropriately. No focal deficits.   MUSCULOSKELETAL: No joint swelling or tenderness.   SKIN: No jaundice. No rashes or lesions.     Labs:  CMP  Recent Labs   Lab 05/28/21  0531 05/27/21  1840 05/27/21  0449 05/26/21  1705 05/26/21  1340 05/24/21  0544 05/24/21  0544    136 135 136  --    < > 137   POTASSIUM 3.5 3.7 3.9 4.1  --    < > 3.9   CHLORIDE 101 102 103 104  --    < > 103   CO2 29 28 27 26  --    < > 28   ANIONGAP 6 5 5 6  --    < > 6   * 294* 264* 380*  --    < > 86   BUN 36* 42* 44* 48*  --    < > 59*   CR 1.58* 1.76* 1.59* 1.82*  --    < > 1.97*   GFRESTIMATED 46* 40* 45* 38*  --    < > 35*   GFRESTBLACK 53* 46* 52* 45*  --    < > 40*   QAMAR 8.5 8.2* 8.4* 8.5  --    < > 8.2*   MAG 1.7 1.9 1.9 2.1  --    < > 2.0   PROTTOTAL  --   --   --   --  4.9*  --  5.5* "   ALBUMIN  --   --   --   --  2.2*  --  2.8*   BILITOTAL  --   --   --   --  0.6  --  0.8   ALKPHOS  --   --   --   --  123  --  147   AST  --   --   --   --  26  --  28   ALT  --   --   --   --  52  --  58    < > = values in this interval not displayed.       CBC  Recent Labs   Lab 05/28/21  0531 05/27/21  0449 05/26/21  1705 05/26/21  1340 05/26/21  0705 05/25/21  0453   WBC 10.2 11.7*  --   --  15.2* 17.2*   RBC 2.97* 2.81*  --   --  2.40* 2.73*   HGB 8.8* 8.3* 7.2* 6.7* 7.2* 8.2*   HCT 28.2* 26.9* 24.0*  --  23.9* 27.5*   MCV 95 96  --   --  100 101*   MCH 29.6 29.5  --   --  30.0 30.0   MCHC 31.2* 30.9*  --   --  30.1* 29.8*   RDW 16.6* 16.8*  --   --  17.4* 18.0*   * 128*  --   --  153 187       INR  No lab results found in last 7 days.    Time/Communication  I personally spent a total of 30 minutes. Of that >15 minutes was counseling/coordination of patient's care. Plan of care discussed with patient. See my note above for details.    Patient discussed with Dr. Conte

## 2021-05-28 NOTE — PROGRESS NOTES
Transplant Social Work Services Progress Note      Date of Initial Social Work Evaluation: 2/10/2021  Collaborated with: PT, 6C RNCC, pt and pt's wife, Cate, via phone ([phone])    Data: Pt is s/p LVAD explant and heart transplant on 5/6. Pt extubated 5/7. Pt working with therapies and recommendation was ARU but today recommendation is home w/ HH (PT/OT/RN). Writer confirmed recommendation with PT. Pt is eager for this recommendation and to go home when medically stable. Pt's wife not here today and did call her to discuss discharge plan if pt discharges over the holiday weekend. Updated her on PT eval today and she is somewhat hesitant as she has not been able to be here today and previously has seen pt not move around well. She plans to be here over the weekend to observe how well pt moves around.. It should be noted that pt's wife will need medication teaching by bedside RN as she was not able to participate in transplant teaching with transplant pharmacist.   Early in post-transplant period writer did review anticipated costs of transplant medications and there were no concerns.  RNCC working on [facility] referral--pt preference is [facility].     Intervention: Supportive Visit, Discharge Planning.     Assessment: There are no concerns with coping or mental health needs. Pt is  eager to go home and start life post-transplant and be with his adopted granddaughter and wife. Despite long post-op course, pt is optimistic and feels fortunate to have been given the opportunity to new chance at life.   Pt is hopeful that he will be able to communicate with his donor. Writer spent some time with pt talking about writing to his donor and also that writer could potentially receive a letter for him from his donor family. In this situation writer would contact him to discuss the letter and assess whether he was ready to receive it.   Education provided by [facility]: Ongoing Social Work support, discharge planning, communicating with  donor family, Virtual Heart Transplant support group  Plan:    Discharge Plans in Progress: Anticipated pt will discharge home w/ HHC    Barriers to d/c plan: medical    Follow up Plan: SW to continue to follow.

## 2021-05-28 NOTE — PROGRESS NOTES
CLINICAL NUTRITION SERVICES - DISCHARGE NOTE    Patient s discharge needs assessed and discharge planning has been conducted with the multidisciplinary transplant care team including physicians, pharmacy, social work and transplant coordinator.    Follow up/Monitoring:  Once discharged, place outpatient nutrition consult via the transplant team if nutrition concerns arise.    Sana Costello, MS, RD, LD, [facility]   6C Pgr: [phone]

## 2021-05-28 NOTE — PROGRESS NOTES
Cardiovascular Surgery Progress Note  05/28/2021         Assessment and Plan:     Mr. Jim Willingham is a 65 year old male with history of NICM EF 20% c/b VT s/p CRT-D s/p HMII LVAD 6/19/2017 originally intended as destination therapy 2/2 obesity however with recent weight loss now candidate for transplantation. He was admitted 2/8/21 for worsening functional status and renal function concerning for worsening heart failure. He was eventually listed status 2E for transplant and received a donor offer.   Jim is now s/p orthotopic heart transplant on 5/6/21 with Dr. Ronnie Quigley. He was shocked > 2x received amio, mag and lidocaine while coming off bypass. Known lung injury with isolated air leak to right pleural that eventually resolved, CT removed 5/23.      Cardiovascular:   Hx of NICM EF 20%, VT (ICD)  Hx HeartMate II LVAD 6/19/2017  Cardiorenal syndrome   S/P orthotopic heart transplant   - No arrhythmia, HD stable.  Most recent echo showed LV EF 65-70% on 5/9/21.  - Biopsy 5/20 - 0R, no AMR, Biopsy 5/27 pending  - Basiliximab 5/10.  Terbutaline weaned to off.    - ASA 81 mg, Crestor 10 mg daily.   - Elevated filling pressures on RHC 5/27. Diuresis per cardiology, Bumex 2 mg PO BID increased to 3 mg IV BID, responding well. Continue per cards II. Lymphedema wraps 5/18, improving.   - Chest tubes: Airleak was isolated to Right pleural. Had some CP after walking off suction 5/13 with associated subcutaneous emphysema, resolved 5/22 and tube removed 5/23 without complication. Stable left hemidiaphragm elevation.   - TPW: removed.       Pulmonary:  Hx COPD and CECILIA on CPAP  Left hemidiaphragm elevation   Air leak from Right pleural tube (unintended injury during surgery)   Possible HAP - persistent opacity in Left retrocardiac base  - PTA Breo ellipta, Incruse ellipta, and Singulair   - Extubated POD 1 to 4 lpm via NC. Now saturating well on RA.   - Pulm toilet, IS, activity and deep breathing  - R pleural  fluid Triglycerides normal on 5/17 (first day of increased drainage)   - Pneumonia-CT notable for increased multifocal groundglass and consolidative opacities.- cont abx as below  - Medicine team to evaluate 5/28 for diagnostic thoracentesis, although right pl effusion small, likely will not have adequate window to tap     Neurology / MSK:  Hx Depression  - PTA bupropion and amitriptyline  Post-op Status  - Neuro intact  - Acute post-operative pain well controlled with acetaminophen, PO dilaudid PRN, IV dilaudid PRN  R Hip Pain   - Had R hip pain increased over 2 days, was keeping him from raising legs into bed independently. Pain with flexion and abduction. Hip X-ray 5/16 without acute concerns, tried ice and pain has resolved.        / Renal:  WALTER on CKD stage III   - Cardiorenal syndrome improving. Most recent creatinine 1.58.    - Day before surgery weight 102.3 kg.      GI / FEN:   Hx Sylvester-en-Y gastric bypass  Non-severe Mild Protein-Calorie Malnutrition   - No Hx N/V or short gut syndrome after RNY (2003).   - Regular diet with supplements, continue bowel regimen.   - Replace electrolytes as needed, hepatic enzymes WNL.  Nausea with Emesis   Had BM 5/18 with lactulose, dosed again 5/24 per patient request. Suppository 5/25 AM with + BM but still feeling nauseated after BM. No clear obstructive signs on Abd Xray. Placed NG tube 5/25 with low intermittent suction. Had a gastrograffin challenge 5/25, contrast traveled to colon. He is feeling better 5/26 am. Minimal output from NG tube and having antegrade bowel function. NG tube removed 5/27  - Continues to do well without n/v  - Continue bowel regimen     Endocrine:  Gout   - PTA allopurinol.  Stress induced hyperglycemia  DMT2, was on Insulin PTA  Initially managed on insulin drip postop, transitioned to NPH BID and sliding scale. Endocrine consulted, signed off 5/9. Converted to Lantus 5/14 am, continue prandial and high resistance sliding corrective  "scale. Lantus held due to poor intake and hypoglycemia. Now hyperglycemic  - Restart lantus at 15U daily (5/28), titrate as able  - Continue SSI     Infectious Disease:  Stress induced leukocytosis  HAP, persistent left lower base opacity   CT Ch/ Abd/ Pelvis 5/18 done for persistent elevated WBC; small consolidative opacity in LL base with ground glass opacities in both lung bases, ? atypical infection with some degree of atelectasis. WBC had been decreasing since, saturating well, no cough, afebrile. CXR 5/22 confirming his persistent elevated Left sandor-diaphragm. CXR 5/25 with evolving LLL opacity suggesting possible aspiration. He was started on levaquin on 5/22, which was switched to zosyn 5/25 given worsening leukocytosis. Sputum/UA sent 5/25, blood cultures 5/26. CPR and WBC trending down  - Continue zosyn   - Continue to follow cultures, NGTD   - WBC WNL, afebrile     Hematology:   Acute blood loss anemia and thrombocytopenia  - Hgb stable, Plts WNL, no signs or symptoms of active bleeding     Prophylaxis:   - Stress ulcer prophylaxis: Pantoprazole 40 mg BID for Hx RNY and nausea   - DVT prophylaxis: Subcutaneous heparin, SCD     Disposition:   - Transferred to  on 5/10   - Therapies currently recommending discharge to ARU    Discussed with CVTS Fellow as needed.  Staff surgeons have been informed of changes through both verbal and written communication.      Lavelle Krueger PA-C  Cardiothoracic Surgery  p: [phone]        Interval History:     No overnight events. Continues to feel better everyday.  States pain is well managed on current regimen. Slept ok overnight.  Tolerating diet  He is passing flatus, Loose BM today. No nausea or vomiting since NG in place.  Denies chest pain, palpitations, dizziness, syncopal symptoms, fevers, chills, myalgias, or sternal popping/clicking.         Physical Exam:   Blood pressure 115/82, pulse 88, temperature 97.5  F (36.4  C), resp. rate 16, height 1.727 m (5' 8\"), " weight 99.2 kg (218 lb 9.6 oz), SpO2 95 %.  Vitals:    05/26/21 0600 05/27/21 0600 05/28/21 0402   Weight: 100.4 kg (221 lb 4.8 oz) 102.8 kg (226 lb 9.6 oz) 99.2 kg (218 lb 9.6 oz)      Gen: A&Ox4, NAD  Neuro: no focal deficits but looks tired  CV: RRR, normal S1 S2, no murmurs, rubs or gallops.   Pulm: CTA, soft expiratory wheezing without rhonchi, normal breathing on RA  Abd: nondistended, normal BS, soft, nontender  Ext: moderate peripheral edema, 2+ pitting  Incision: clean, dry, intact, no erythema, sternum stable  Tubes/drain sites: dressing clean and dry         Data:    Imaging:  reviewed recent imaging, no acute concerns  Recent Results (from the past 24 hour(s))   Cardiac Catheterization    Narrative      Successful endomyocardial biopsy. Results pending.    Fluoroscopy was used to visualize the right internal jugular vein.    Right sided filling pressures are mildly elevated.    Moderately elevated pulmonary artery hypertension.    Left sided filling pressures are moderately elevated.    Left ventricular filling pressures are mildly elevated.    Normal cardiac output level.          Labs:  BMP  Recent Labs   Lab 05/28/21  0531 05/27/21  1840 05/27/21  0449 05/26/21  1705    136 135 136   POTASSIUM 3.5 3.7 3.9 4.1   CHLORIDE 101 102 103 104   QAMAR 8.5 8.2* 8.4* 8.5   CO2 29 28 27 26   BUN 36* 42* 44* 48*   CR 1.58* 1.76* 1.59* 1.82*   * 294* 264* 380*     CBC  Recent Labs   Lab 05/28/21  0531 05/27/21  0449 05/26/21  1705 05/26/21  1340 05/26/21  0705 05/25/21  0453   WBC 10.2 11.7*  --   --  15.2* 17.2*   RBC 2.97* 2.81*  --   --  2.40* 2.73*   HGB 8.8* 8.3* 7.2* 6.7* 7.2* 8.2*   HCT 28.2* 26.9* 24.0*  --  23.9* 27.5*   MCV 95 96  --   --  100 101*   MCH 29.6 29.5  --   --  30.0 30.0   MCHC 31.2* 30.9*  --   --  30.1* 29.8*   RDW 16.6* 16.8*  --   --  17.4* 18.0*   * 128*  --   --  153 187     INR  No lab results found in last 7 days.   Hepatic Panel  Recent Labs   Lab 05/26/21  1347  05/24/21  0544   AST 26 28   ALT 52 58   ALKPHOS 123 147   BILITOTAL 0.6 0.8   ALBUMIN 2.2* 2.8*     GLUCOSE:   Recent Labs   Lab 05/28/21  0531 05/28/21  0328 05/27/21  2336 05/27/21  1840 05/27/21  1527 05/27/21  1132 05/27/21  0818 05/27/21  0559 05/27/21  0449 05/26/21  1705 05/26/21  1705 05/26/21  0705 05/26/21  0705 05/25/21  1627 05/25/21  1627   *  --   --  294*  --   --   --   --  264*  --  380*  --  234*  --  154*   BGM  --  212* 338*  --  245* 216* 220* 255*  --    < >  --    < >  --    < >  --     < > = values in this interval not displayed.

## 2021-05-28 NOTE — PROGRESS NOTES
Care Management Follow Up    Length of Stay (days): 109    Expected Discharge Date: 06/02/21     Concerns to be Addressed:       Patient plan of care discussed at interdisciplinary rounds: Yes    Anticipated Discharge Disposition:  Home      Anticipated Discharge Services:  Home care    Anticipated Discharge DME:  PTA      Referrals Placed by CM/SW:  Home Care   Private pay costs discussed: Not applicable    Additional Information:  This writer was informed that patient will progress to home and will need home care services and prefers to have [facility] home care. This writer sent a referral to [facility] care.       April Pereira, RN  Float RN Care Coordinator  Pager [phone] (unit RNCC pager)     To get in touch with the Weekend & Holiday on call RN Care Coordinator:  Pager:  [phone] OR Care Coordinator job code/pager [phone]

## 2021-05-28 NOTE — PLAN OF CARE
D: Heart transplant on 5/6/21.     I: Monitored vitals and assessed pt status.   Changed: solution meds to PO  Running: Tacro @ 110 mcg/hr  PRN: tylenol, flexeril, dilaudid     A: A0x4. SR/ST. On RA with sats in mid-high 90s. Hypertensive at 2300 (152/95). One time labetalol given with no change. One time Lasix given with significant output and BP decrease to 115/82. BG running high. Reports mild throat soreness from NG tube, declined pain med.     P: Continue antibiotic q6hrs, strict I/Os, encourage IS, ontinue to monitor Pt status and report changes to treatment team.

## 2021-05-29 ENCOUNTER — APPOINTMENT (OUTPATIENT)
Dept: OCCUPATIONAL THERAPY | Facility: CLINIC | Age: 66
DRG: 001 | End: 2021-05-29
Attending: INTERNAL MEDICINE
Payer: COMMERCIAL

## 2021-05-29 LAB
ANION GAP SERPL CALCULATED.3IONS-SCNC: 8 MMOL/L (ref 3–14)
BASOPHILS # BLD AUTO: 0 10E9/L (ref 0–0.2)
BASOPHILS NFR BLD AUTO: 0 %
BUN SERPL-MCNC: 33 MG/DL (ref 7–30)
CALCIUM SERPL-MCNC: 8.1 MG/DL (ref 8.5–10.1)
CHLORIDE SERPL-SCNC: 103 MMOL/L (ref 94–109)
CO2 SERPL-SCNC: 28 MMOL/L (ref 20–32)
CREAT SERPL-MCNC: 1.69 MG/DL (ref 0.66–1.25)
DIFFERENTIAL METHOD BLD: ABNORMAL
EOSINOPHIL # BLD AUTO: 0 10E9/L (ref 0–0.7)
EOSINOPHIL NFR BLD AUTO: 0 %
ERYTHROCYTE [DISTWIDTH] IN BLOOD BY AUTOMATED COUNT: 16.2 % (ref 10–15)
GFR SERPL CREATININE-BSD FRML MDRD: 42 ML/MIN/{1.73_M2}
GLUCOSE BLDC GLUCOMTR-MCNC: 191 MG/DL (ref 70–99)
GLUCOSE BLDC GLUCOMTR-MCNC: 195 MG/DL (ref 70–99)
GLUCOSE BLDC GLUCOMTR-MCNC: 202 MG/DL (ref 70–99)
GLUCOSE BLDC GLUCOMTR-MCNC: 223 MG/DL (ref 70–99)
GLUCOSE BLDC GLUCOMTR-MCNC: 223 MG/DL (ref 70–99)
GLUCOSE BLDC GLUCOMTR-MCNC: 258 MG/DL (ref 70–99)
GLUCOSE SERPL-MCNC: 232 MG/DL (ref 70–99)
HCT VFR BLD AUTO: 28.4 % (ref 40–53)
HGB BLD-MCNC: 8.7 G/DL (ref 13.3–17.7)
IMM GRANULOCYTES # BLD: 0.1 10E9/L (ref 0–0.4)
IMM GRANULOCYTES NFR BLD: 0.9 %
LDH SERPL L TO P-CCNC: 262 U/L (ref 85–227)
LYMPHOCYTES # BLD AUTO: 0.5 10E9/L (ref 0.8–5.3)
LYMPHOCYTES NFR BLD AUTO: 6.6 %
MAGNESIUM SERPL-MCNC: 1.9 MG/DL (ref 1.6–2.3)
MAGNESIUM SERPL-MCNC: 2 MG/DL (ref 1.6–2.3)
MCH RBC QN AUTO: 29.3 PG (ref 26.5–33)
MCHC RBC AUTO-ENTMCNC: 30.6 G/DL (ref 31.5–36.5)
MCV RBC AUTO: 96 FL (ref 78–100)
MONOCYTES # BLD AUTO: 0.3 10E9/L (ref 0–1.3)
MONOCYTES NFR BLD AUTO: 4.7 %
NEUTROPHILS # BLD AUTO: 6 10E9/L (ref 1.6–8.3)
NEUTROPHILS NFR BLD AUTO: 87.8 %
NRBC # BLD AUTO: 0 10*3/UL
NRBC BLD AUTO-RTO: 0 /100
PLATELET # BLD AUTO: 124 10E9/L (ref 150–450)
POTASSIUM SERPL-SCNC: 3.2 MMOL/L (ref 3.4–5.3)
RBC # BLD AUTO: 2.97 10E12/L (ref 4.4–5.9)
SODIUM SERPL-SCNC: 139 MMOL/L (ref 133–144)
TACROLIMUS BLD-MCNC: 9 UG/L (ref 5–15)
TME LAST DOSE: NORMAL H
WBC # BLD AUTO: 6.8 10E9/L (ref 4–11)

## 2021-05-29 PROCEDURE — 99232 SBSQ HOSP IP/OBS MODERATE 35: CPT | Performed by: NURSE PRACTITIONER

## 2021-05-29 PROCEDURE — 250N000013 HC RX MED GY IP 250 OP 250 PS 637: Performed by: PHYSICIAN ASSISTANT

## 2021-05-29 PROCEDURE — 83615 LACTATE (LD) (LDH) ENZYME: CPT | Performed by: PHYSICIAN ASSISTANT

## 2021-05-29 PROCEDURE — 85025 COMPLETE CBC W/AUTO DIFF WBC: CPT | Performed by: PHYSICIAN ASSISTANT

## 2021-05-29 PROCEDURE — 250N000011 HC RX IP 250 OP 636: Performed by: NURSE PRACTITIONER

## 2021-05-29 PROCEDURE — 83010 ASSAY OF HAPTOGLOBIN QUANT: CPT | Performed by: PHYSICIAN ASSISTANT

## 2021-05-29 PROCEDURE — 80197 ASSAY OF TACROLIMUS: CPT | Performed by: PHYSICIAN ASSISTANT

## 2021-05-29 PROCEDURE — 80048 BASIC METABOLIC PNL TOTAL CA: CPT | Performed by: PHYSICIAN ASSISTANT

## 2021-05-29 PROCEDURE — 250N000013 HC RX MED GY IP 250 OP 250 PS 637: Performed by: STUDENT IN AN ORGANIZED HEALTH CARE EDUCATION/TRAINING PROGRAM

## 2021-05-29 PROCEDURE — 250N000013 HC RX MED GY IP 250 OP 250 PS 637: Performed by: NURSE PRACTITIONER

## 2021-05-29 PROCEDURE — 999N001017 HC STATISTIC GLUCOSE BY METER IP

## 2021-05-29 PROCEDURE — 272N000202 HC AEROBIKA WITH MANOMETER

## 2021-05-29 PROCEDURE — 250N000009 HC RX 250: Performed by: PHYSICIAN ASSISTANT

## 2021-05-29 PROCEDURE — 83735 ASSAY OF MAGNESIUM: CPT | Performed by: PHYSICIAN ASSISTANT

## 2021-05-29 PROCEDURE — 250N000013 HC RX MED GY IP 250 OP 250 PS 637

## 2021-05-29 PROCEDURE — 94640 AIRWAY INHALATION TREATMENT: CPT | Mod: 76

## 2021-05-29 PROCEDURE — 214N000001 HC R&B CCU UMMC

## 2021-05-29 PROCEDURE — 36592 COLLECT BLOOD FROM PICC: CPT | Performed by: PHYSICIAN ASSISTANT

## 2021-05-29 PROCEDURE — 999N000157 HC STATISTIC RCP TIME EA 10 MIN

## 2021-05-29 PROCEDURE — 250N000011 HC RX IP 250 OP 636: Performed by: PHYSICIAN ASSISTANT

## 2021-05-29 PROCEDURE — 97530 THERAPEUTIC ACTIVITIES: CPT | Mod: GO | Performed by: OCCUPATIONAL THERAPIST

## 2021-05-29 PROCEDURE — 250N000013 HC RX MED GY IP 250 OP 250 PS 637: Performed by: SURGERY

## 2021-05-29 PROCEDURE — 250N000012 HC RX MED GY IP 250 OP 636 PS 637: Performed by: PHYSICIAN ASSISTANT

## 2021-05-29 PROCEDURE — 250N000011 HC RX IP 250 OP 636: Performed by: SURGERY

## 2021-05-29 PROCEDURE — 94640 AIRWAY INHALATION TREATMENT: CPT

## 2021-05-29 RX ORDER — MAGNESIUM SULFATE HEPTAHYDRATE 40 MG/ML
2 INJECTION, SOLUTION INTRAVENOUS ONCE
Status: COMPLETED | OUTPATIENT
Start: 2021-05-29 | End: 2021-05-29

## 2021-05-29 RX ORDER — BUMETANIDE 0.25 MG/ML
3 INJECTION INTRAMUSCULAR; INTRAVENOUS ONCE
Status: COMPLETED | OUTPATIENT
Start: 2021-05-29 | End: 2021-05-29

## 2021-05-29 RX ORDER — SULFAMETHOXAZOLE AND TRIMETHOPRIM 400; 80 MG/1; MG/1
1 TABLET ORAL
Status: DISCONTINUED | OUTPATIENT
Start: 2021-05-29 | End: 2021-05-31 | Stop reason: HOSPADM

## 2021-05-29 RX ORDER — POTASSIUM CHLORIDE 750 MG/1
40 TABLET, EXTENDED RELEASE ORAL 2 TIMES DAILY
Status: DISCONTINUED | OUTPATIENT
Start: 2021-05-29 | End: 2021-05-30

## 2021-05-29 RX ADMIN — Medication 10 MG: at 22:18

## 2021-05-29 RX ADMIN — SODIUM CHLORIDE, PRESERVATIVE FREE 5 ML: 5 INJECTION INTRAVENOUS at 20:30

## 2021-05-29 RX ADMIN — TACROLIMUS 4 MG: 1 CAPSULE ORAL at 17:57

## 2021-05-29 RX ADMIN — BUMETANIDE 3 MG: 0.25 INJECTION, SOLUTION INTRAMUSCULAR; INTRAVENOUS at 09:45

## 2021-05-29 RX ADMIN — BUMETANIDE 3 MG: 0.25 INJECTION, SOLUTION INTRAMUSCULAR; INTRAVENOUS at 13:07

## 2021-05-29 RX ADMIN — FLUTICASONE FUROATE AND VILANTEROL TRIFENATATE 1 PUFF: 100; 25 POWDER RESPIRATORY (INHALATION) at 08:34

## 2021-05-29 RX ADMIN — NYSTATIN 1000000 UNITS: 500000 SUSPENSION ORAL at 20:52

## 2021-05-29 RX ADMIN — HEPARIN SODIUM 5000 UNITS: 5000 INJECTION, SOLUTION INTRAVENOUS; SUBCUTANEOUS at 16:17

## 2021-05-29 RX ADMIN — Medication 50 MG: at 22:18

## 2021-05-29 RX ADMIN — SODIUM CHLORIDE, PRESERVATIVE FREE 5 ML: 5 INJECTION INTRAVENOUS at 05:59

## 2021-05-29 RX ADMIN — PREDNISONE 15 MG: 5 TABLET ORAL at 17:57

## 2021-05-29 RX ADMIN — BUMETANIDE 3 MG: 2 TABLET ORAL at 16:17

## 2021-05-29 RX ADMIN — UMECLIDINIUM 1 PUFF: 62.5 AEROSOL, POWDER ORAL at 08:33

## 2021-05-29 RX ADMIN — PANTOPRAZOLE SODIUM 40 MG: 40 TABLET, DELAYED RELEASE ORAL at 20:53

## 2021-05-29 RX ADMIN — ROSUVASTATIN CALCIUM 10 MG: 10 TABLET, FILM COATED ORAL at 08:30

## 2021-05-29 RX ADMIN — INSULIN ASPART 3 UNITS: 100 INJECTION, SOLUTION INTRAVENOUS; SUBCUTANEOUS at 15:03

## 2021-05-29 RX ADMIN — NYSTATIN 1000000 UNITS: 500000 SUSPENSION ORAL at 16:17

## 2021-05-29 RX ADMIN — ASPIRIN 81 MG CHEWABLE TABLET 81 MG: 81 TABLET CHEWABLE at 08:30

## 2021-05-29 RX ADMIN — ACETAMINOPHEN 975 MG: 325 TABLET, FILM COATED ORAL at 16:16

## 2021-05-29 RX ADMIN — PIPERACILLIN AND TAZOBACTAM 3.38 G: 3; .375 INJECTION, POWDER, LYOPHILIZED, FOR SOLUTION INTRAVENOUS at 16:23

## 2021-05-29 RX ADMIN — BUPROPION HYDROCHLORIDE 75 MG: 75 TABLET, FILM COATED ORAL at 13:08

## 2021-05-29 RX ADMIN — POTASSIUM CHLORIDE 40 MEQ: 750 TABLET, EXTENDED RELEASE ORAL at 14:27

## 2021-05-29 RX ADMIN — PANTOPRAZOLE SODIUM 40 MG: 40 TABLET, DELAYED RELEASE ORAL at 08:31

## 2021-05-29 RX ADMIN — ALBUTEROL SULFATE 2.5 MG: 2.5 SOLUTION RESPIRATORY (INHALATION) at 15:48

## 2021-05-29 RX ADMIN — Medication 1 TABLET: at 08:33

## 2021-05-29 RX ADMIN — ALLOPURINOL 300 MG: 300 TABLET ORAL at 08:31

## 2021-05-29 RX ADMIN — MYCOPHENOLATE MOFETIL 1000 MG: 250 CAPSULE ORAL at 08:34

## 2021-05-29 RX ADMIN — PIPERACILLIN AND TAZOBACTAM 3.38 G: 3; .375 INJECTION, POWDER, LYOPHILIZED, FOR SOLUTION INTRAVENOUS at 22:10

## 2021-05-29 RX ADMIN — BUPROPION HYDROCHLORIDE 75 MG: 75 TABLET, FILM COATED ORAL at 08:30

## 2021-05-29 RX ADMIN — GABAPENTIN 600 MG: 300 CAPSULE ORAL at 22:10

## 2021-05-29 RX ADMIN — TACROLIMUS 4 MG: 1 CAPSULE ORAL at 08:34

## 2021-05-29 RX ADMIN — ALBUTEROL SULFATE 2.5 MG: 2.5 SOLUTION RESPIRATORY (INHALATION) at 12:16

## 2021-05-29 RX ADMIN — Medication 5 ML: at 13:15

## 2021-05-29 RX ADMIN — VALGANCICLOVIR 450 MG: 450 TABLET, FILM COATED ORAL at 08:35

## 2021-05-29 RX ADMIN — MYCOPHENOLATE MOFETIL 1000 MG: 250 CAPSULE ORAL at 17:58

## 2021-05-29 RX ADMIN — Medication 37.5 MG: at 22:09

## 2021-05-29 RX ADMIN — MAGNESIUM SULFATE IN WATER 2 G: 40 INJECTION, SOLUTION INTRAVENOUS at 13:18

## 2021-05-29 RX ADMIN — ACETAMINOPHEN 975 MG: 325 TABLET, FILM COATED ORAL at 08:29

## 2021-05-29 RX ADMIN — PIPERACILLIN AND TAZOBACTAM 3.38 G: 3; .375 INJECTION, POWDER, LYOPHILIZED, FOR SOLUTION INTRAVENOUS at 03:17

## 2021-05-29 RX ADMIN — PIPERACILLIN AND TAZOBACTAM 3.38 G: 3; .375 INJECTION, POWDER, LYOPHILIZED, FOR SOLUTION INTRAVENOUS at 10:06

## 2021-05-29 RX ADMIN — PREDNISONE 20 MG: 20 TABLET ORAL at 08:31

## 2021-05-29 RX ADMIN — POTASSIUM CHLORIDE 40 MEQ: 750 TABLET, EXTENDED RELEASE ORAL at 20:53

## 2021-05-29 RX ADMIN — INSULIN ASPART 2 UNITS: 100 INJECTION, SOLUTION INTRAVENOUS; SUBCUTANEOUS at 10:10

## 2021-05-29 RX ADMIN — MONTELUKAST 10 MG: 10 TABLET, FILM COATED ORAL at 22:09

## 2021-05-29 RX ADMIN — NYSTATIN 1000000 UNITS: 500000 SUSPENSION ORAL at 08:29

## 2021-05-29 RX ADMIN — Medication 50 MG: at 20:52

## 2021-05-29 RX ADMIN — SULFAMETHOXAZOLE AND TRIMETHOPRIM 1 TABLET: 400; 80 TABLET ORAL at 17:58

## 2021-05-29 RX ADMIN — Medication 1 TABLET: at 20:53

## 2021-05-29 RX ADMIN — NYSTATIN 1000000 UNITS: 500000 SUSPENSION ORAL at 12:15

## 2021-05-29 RX ADMIN — ALBUTEROL SULFATE 2.5 MG: 2.5 SOLUTION RESPIRATORY (INHALATION) at 07:46

## 2021-05-29 RX ADMIN — HEPARIN SODIUM 5000 UNITS: 5000 INJECTION, SOLUTION INTRAVENOUS; SUBCUTANEOUS at 08:35

## 2021-05-29 RX ADMIN — ALBUTEROL SULFATE 2.5 MG: 2.5 SOLUTION RESPIRATORY (INHALATION) at 20:15

## 2021-05-29 RX ADMIN — GABAPENTIN 600 MG: 300 CAPSULE ORAL at 13:08

## 2021-05-29 RX ADMIN — GABAPENTIN 300 MG: 300 CAPSULE ORAL at 08:33

## 2021-05-29 ASSESSMENT — ACTIVITIES OF DAILY LIVING (ADL)
ADLS_ACUITY_SCORE: 12

## 2021-05-29 ASSESSMENT — MIFFLIN-ST. JEOR: SCORE: 1747.46

## 2021-05-29 NOTE — PLAN OF CARE
Temp: 98.5  F (36.9  C) Temp src: Axillary BP: 116/75 Pulse: 102   Resp: 18 SpO2: 96 % O2 Device: None (Room air)      Significant event: pt got up to go the bathroom and felt his leg were weak. He laid on the floor and waited for someone to come in. No injuries were found. VVS. Provider notified.    Change: Pt has a old CT site that's leaking serous fluid. Provider was notified. Came and looked at it and applied dressing. 10 min later, dressing had to be changed again. Provider was notified. Nurse instructed to continue reinforcing dressing.    A:   Neuro: A/Ox4. Calls appropriately. Pleasant and cooperative. Sleep well overnight   Cardiac/Tele:  VVS. SR/ST. Afebrile. Denies chest pain   Respiratory: Room air. Tolerating well  GI/: Continent. Loose stool x1. Urinal and commode at bedside  Diet/Appetite: Regular diet.   Skin: Old chest tube site leaking. Provider notified  LDAs: PIV, PICC- SL  Activity: Independent  Pain: Denies pain    P: Continue to monitor and notify team with changes.

## 2021-05-29 NOTE — PROGRESS NOTES
Cardiovascular Surgery Progress Note  05/29/2021         Assessment and Plan:     Mr. Jim Willingham is a 65 year old male with history of NICM EF 20% c/b VT s/p CRT-D s/p HMII LVAD 6/19/2017 originally intended as destination therapy 2/2 obesity however with recent weight loss now candidate for transplantation. He was admitted 2/8/21 for worsening functional status and renal function concerning for worsening heart failure. He was eventually listed status 2E for transplant and received a donor offer.   Jim is now s/p orthotopic heart transplant on 5/6/21 with Dr. Ronnie Quigley. He was shocked > 2x received amio, mag and lidocaine while coming off bypass. Known lung injury with isolated air leak to right pleural that eventually resolved, CT removed 5/23.      Cardiovascular:   Hx of NICM EF 20%, VT (ICD)  Hx HeartMate II LVAD 6/19/2017  Cardiorenal syndrome   S/P orthotopic heart transplant   - No arrhythmia, HD stable.  Most recent echo showed LV EF 65-70% on 5/9/21.  - Biopsy 5/20 - 0R, no AMR, Biopsy 5/27 pending  - Basiliximab 5/10.  Terbutaline weaned to off.    - ASA 81 mg, Crestor 10 mg daily.   - Elevated filling pressures on RHC 5/27. Diuresis per cardiology, Bumex 2 mg PO BID increased to 3 mg IV BID, responding well. Continue per cards II. Lymphedema wraps 5/18, improving.   - Chest tubes: Airleak was isolated to Right pleural. Had some CP after walking off suction 5/13 with associated subcutaneous emphysema, resolved 5/22 and tube removed 5/23 without complication. Stable left hemidiaphragm elevation.   - TPW: removed.       Pulmonary:  Hx COPD and CECILIA on CPAP  Left hemidiaphragm elevation   Air leak from Right pleural tube (unintended injury during surgery)   Possible HAP - persistent opacity in Left retrocardiac base  - PTA Breo ellipta, Incruse ellipta, and Singulair   - Extubated POD 1 to 4 lpm via NC. Now saturating well on RA.   - Pulm toilet, IS, activity and deep breathing  - R pleural  fluid Triglycerides normal on 5/17 (first day of increased drainage)   - Pneumonia-CT notable for increased multifocal groundglass and consolidative opacities.- cont abx as below     Neurology / MSK:  Hx Depression  - PTA bupropion and amitriptyline  Post-op Status  - Neuro intact  - Acute post-operative pain well controlled with acetaminophen, PO dilaudid PRN, IV dilaudid PRN  R Hip Pain   - Had R hip pain increased over 2 days, was keeping him from raising legs into bed independently. Pain with flexion and abduction. Hip X-ray 5/16 without acute concerns, tried ice and pain has resolved.        / Renal:  WALTER on CKD stage III   - Cardiorenal syndrome improving. Most recent creatinine 1.69, stable.    - Day before surgery weight 102.3 kg.   - Diuresis per cardiology-switching to oral Bumex today.      GI / FEN:   Hx Sylvester-en-Y gastric bypass  Non-severe Mild Protein-Calorie Malnutrition   - No Hx N/V or short gut syndrome after RNY (2003).   - Regular diet with supplements, continue bowel regimen.   - Replace electrolytes as needed, hepatic enzymes WNL.  Nausea with Emesis   Had BM 5/18 with lactulose, dosed again 5/24 per patient request. Suppository 5/25 AM with + BM but still feeling nauseated after BM. No clear obstructive signs on Abd Xray. Placed NG tube 5/25 with low intermittent suction. Had a gastrograffin challenge 5/25, contrast traveled to colon. He is feeling better 5/26 am. Minimal output from NG tube and having antegrade bowel function. NG tube removed 5/27  - Continues to do well without n/v  - patient now with loose stools, patient cutting back on bowel regimen.      Endocrine:  Gout   - PTA allopurinol.  Stress induced hyperglycemia  DMT2, was on Insulin PTA  Initially managed on insulin drip postop, transitioned to NPH BID and sliding scale. Endocrine consulted, signed off 5/9. Converted to Lantus 5/14 am, continue prandial and high resistance sliding corrective scale. Lantus held due to poor  intake and hypoglycemia. Now hyperglycemic  - Restart lantus at 15U daily (5/28), titrate as able, increase to Lantus 20u 5/29  - Continue sliding scale insulin  - May need to reconsult endo if no improvement with increased Lantus.     Infectious Disease:  Stress induced leukocytosis  HAP, persistent left lower base opacity   CT Ch/ Abd/ Pelvis 5/18 done for persistent elevated WBC; small consolidative opacity in LL base with ground glass opacities in both lung bases, ? atypical infection with some degree of atelectasis. WBC had been decreasing since, saturating well, no cough, afebrile. CXR 5/22 confirming his persistent elevated Left sandor-diaphragm. CXR 5/25 with evolving LLL opacity suggesting possible aspiration. He was started on levaquin on 5/22, which was switched to zosyn 5/25 given worsening leukocytosis. Sputum/UA sent 5/25, blood cultures 5/26. CPR and WBC trending down  - Continue zosyn until 5/31.   - Continue to follow cultures, NGTD   - WBC WNL, afebrile     Hematology:   Acute blood loss anemia and thrombocytopenia  - Hgb stable, Plts WNL, no signs or symptoms of active bleeding     Prophylaxis:   - Stress ulcer prophylaxis: Pantoprazole 40 mg BID for Hx RNY and nausea   - DVT prophylaxis: Subcutaneous heparin, SCD     Disposition:   - Transferred to  on 5/10   - Therapies currently recommending discharge to ARU    Discussed with CVTS Fellow as needed.  Staff surgeons have been informed of changes through both verbal and written communication.      Clara Ponce PA-C  Cardiothoracic Surgery  Pager [phone]  May 29, 2021          Interval History:     No overnight events. Continues to feel better everyday.  States pain is well managed on current regimen. Slept ok overnight.  Tolerating diet  He is passing flatus, Loose BM again today, holding bowel regimen. NG now out, no N/V. Denies chest pain, palpitations, dizziness, syncopal symptoms, fevers, chills, myalgias, or sternal  "popping/clicking.         Physical Exam:   Blood pressure 118/77, pulse 101, temperature 98.1  F (36.7  C), temperature source Oral, resp. rate (!) 118, height 1.727 m (5' 8\"), weight 97.8 kg (215 lb 9.6 oz), SpO2 97 %.  Vitals:    05/27/21 0600 05/28/21 0402 05/29/21 0815   Weight: 102.8 kg (226 lb 9.6 oz) 99.2 kg (218 lb 9.6 oz) 97.8 kg (215 lb 9.6 oz)      Gen: A&Ox4, NAD  Neuro: no focal deficits but looks tired  CV: RRR, normal S1 S2, no murmurs, rubs or gallops.   Pulm: CTA, soft expiratory wheezing without rhonchi, normal breathing on RA  Abd: nondistended, normal BS, soft, nontender  Ext: moderate peripheral edema, 1-2+ pitting  Incision: clean, dry, intact, no erythema, sternum stable  Tubes/drain sites: dressing clean and dry         Data:    Imaging:  reviewed recent imaging, no acute concerns  No results found for this or any previous visit (from the past 24 hour(s)).    Labs:  BMP  Recent Labs   Lab 05/29/21  0606 05/28/21  2229 05/28/21  0531 05/27/21  1840    138 135 136   POTASSIUM 3.2* 3.1* 3.5 3.7   CHLORIDE 103 102 101 102   QAMAR 8.1* 8.2* 8.5 8.2*   CO2 28 28 29 28   BUN 33* 34* 36* 42*   CR 1.69* 1.68* 1.58* 1.76*   * 197* 230* 294*     CBC  Recent Labs   Lab 05/29/21  0606 05/28/21  0531 05/27/21  0449 05/26/21  1705 05/26/21  0705 05/26/21  0705   WBC 6.8 10.2 11.7*  --   --  15.2*   RBC 2.97* 2.97* 2.81*  --   --  2.40*   HGB 8.7* 8.8* 8.3* 7.2*   < > 7.2*   HCT 28.4* 28.2* 26.9* 24.0*  --  23.9*   MCV 96 95 96  --   --  100   MCH 29.3 29.6 29.5  --   --  30.0   MCHC 30.6* 31.2* 30.9*  --   --  30.1*   RDW 16.2* 16.6* 16.8*  --   --  17.4*   * 125* 128*  --   --  153    < > = values in this interval not displayed.     INR  No lab results found in last 7 days.   Hepatic Panel  Recent Labs   Lab 05/26/21  1340 05/24/21  0544   AST 26 28   ALT 52 58   ALKPHOS 123 147   BILITOTAL 0.6 0.8   ALBUMIN 2.2* 2.8*     GLUCOSE:   Recent Labs   Lab 05/29/21  0824 05/29/21  0606 " 05/29/21  0324 05/28/21  2229 05/28/21  2142 05/28/21  1607 05/28/21  1138 05/28/21  0725 05/28/21  0531 05/27/21  1840 05/27/21  1840 05/27/21  0449 05/27/21  0449 05/26/21  1705 05/26/21  1705   GLC  --  232*  --  197*  --   --   --   --  230*  --  294*  --  264*  --  380*   *  --  258*  --  184* 144* 203* 211*  --    < >  --    < >  --    < >  --     < > = values in this interval not displayed.

## 2021-05-29 NOTE — CONSULTS
[facility]   Cardiology II Service / Advanced Heart Failure  Consult Note  Date of Service: 5/29/2021      Patient: Jim Willingham  MRN: [id number]  Admission Date: 2/8/2021  Hospital Day # 110    Assessment and Plan:  Mr. Jim Willingham is a 65yr old male with a history of NICM (LVEF < 30%) s/p HM2 LVAD (6/2017), VT, AFib, DMII, CKD, and COPD who presented with decompensated heart failure and is now s/p OHT 5/6/21.      RECOMMENDATIONS:  - restart bactrim single strength q48 hours (increase to daily pending renal function)  - stop IV bumex  - start bumex 3mg po twice daily  - repeat tac level tomorrow  - biopsy results from 5/27 are in process  - anticipate discharge Monday (will have completed 10 days of antibiotics)        NICM, s/p OHT 5/6/21  His post-transplant course has been c/b persistent leukocytosis largely due to steroids, and pneumonia.    Biopsies have remained negative for rejection.  Last RHC 5/27 (weight 226#) showed mildly elevated biventricular filling pressures, with RA 11, mPA 33, PCW 23, and CI 3.09.  TTE 5/14 showed stable graft function, with LVEF > 70% and normal RV size/function.      Serostatus:  - CMV D+/R+  - EBV D+/R+  - Toxo D-/R-    Immunosuppression:  - received Simulect  - prednisone taper --> weaned to 20mg/15mg on 5/28 (to reflect negative biopsy from 5/20).  Biopsy results from 5/27 are in process --> will need to wean prednisone pending results.  - MMF 1000mg twice daily (dose reduced due to infection).  Will consider increasing to 1500mg twice daily tomorrow.  - tacrolimus, goal level 10-12.  Tac level today was 9, reflecting an ~10.5hr trough.  Continue 4mg twice daily, and will repeat level tomorrow.    PPx:  - CAV:  Aspirin 81mg daily and rosuvastatin 10mg daily  - GI:  Pantoprazole 40mg twice daily  - Osteoporosis:  Calcium/vitamin D supplements  - CMV:  valcyte 450mg daily (x3 months, through August 6, 2021)  - PCP:  Restart bactrim single strength q48  "hours  - Thrush:  Nystatin s/s 5x/day  - Toxo:  N/a    Graft function:  - BPs:  Controlled, not on antihypertensives  - HRs:  Stable, off terbutaline  - fluid status:  Euvolemic, stopping IV bumex and starting bumex 3mg po twice daily    Health maintenance:  - awaiting biopsy results from 5/27  - due for RHC/cors/bx, scheduled 6/7      =============================================================    Interval History/ROS:   Mr. Willingham states that he feels well today, much better than where he was last week.  He has been working with rehab, and notes improvement overall.  His breathing has been good, and he denies SOB, PND, and orthopnea.  He is eating, with improved appetite and denies nausea and vomiting.  He has continued to have diarrhea today.  He feels well from a fluid standpoint, and notes that his weight is 215#, which feels great.  He otherwise denies chest pain, palpitations, dizziness, headaches, acute vision changes, fevers, chills, cough, sore throat, and signs of bleeding.      Last 24 hr care team notes reviewed.   ROS:  4 point ROS including Respiratory, CV, GI and , other than that noted in the HPI, is negative.     Medications: Reviewed in EPIC.     Physical Exam:   /77 (BP Location: Right arm)   Pulse 101   Temp 98.1  F (36.7  C) (Oral)   Resp (!) 118   Ht 1.727 m (5' 8\")   Wt 97.8 kg (215 lb 9.6 oz)   SpO2 97%   BMI 32.78 kg/m    GENERAL: Appears alert and oriented times three. Sitting upright in chair, NAD.  HEENT: Eye symmetrical and free of discharge bilaterally. Mucous membranes moist and without lesions.  NECK: Supple and without lymphadenopathy. JVD negative when sitting upright.   CV: RRR, S1S2 present without murmur, rub, or gallop.   RESPIRATORY: Respirations regular, even, and unlabored. Lungs CTA throughout.   GI: Soft and non distended with normoactive bowel sounds present in all quadrants. No tenderness, rebound, guarding. No organomegaly.   EXTREMITIES: No peripheral " edema. 2+ bilateral pedal pulses.   NEUROLOGIC: Alert and orientated x 3. CN II-XII grossly intact. No focal deficits.   MUSCULOSKELETAL: No joint swelling or tenderness.   SKIN: No jaundice. No rashes or lesions. Surgical incisions pink, with edges approximated, no redness or drainage.    Data:  CMP  Recent Labs   Lab 05/29/21  0606 05/28/21  2229 05/28/21  0531 05/27/21  1840 05/26/21  1340 05/26/21  1340 05/24/21  0544 05/24/21  0544    138 135 136   < >  --    < > 137   POTASSIUM 3.2* 3.1* 3.5 3.7   < >  --    < > 3.9   CHLORIDE 103 102 101 102   < >  --    < > 103   CO2 28 28 29 28   < >  --    < > 28   ANIONGAP 8 7 6 5   < >  --    < > 6   * 197* 230* 294*   < >  --    < > 86   BUN 33* 34* 36* 42*   < >  --    < > 59*   CR 1.69* 1.68* 1.58* 1.76*   < >  --    < > 1.97*   GFRESTIMATED 42* 42* 46* 40*   < >  --    < > 35*   GFRESTBLACK 49* 49* 53* 46*   < >  --    < > 40*   QAMAR 8.1* 8.2* 8.5 8.2*   < >  --    < > 8.2*   MAG 1.9 2.0 1.7 1.9   < >  --    < > 2.0   PROTTOTAL  --   --   --   --   --  4.9*  --  5.5*   ALBUMIN  --   --   --   --   --  2.2*  --  2.8*   BILITOTAL  --   --   --   --   --  0.6  --  0.8   ALKPHOS  --   --   --   --   --  123  --  147   AST  --   --   --   --   --  26  --  28   ALT  --   --   --   --   --  52  --  58    < > = values in this interval not displayed.     CBC  Recent Labs   Lab 05/29/21  0606 05/28/21  0531 05/27/21  0449 05/26/21  1705 05/26/21 0705 05/26/21 0705   WBC 6.8 10.2 11.7*  --   --  15.2*   RBC 2.97* 2.97* 2.81*  --   --  2.40*   HGB 8.7* 8.8* 8.3* 7.2*   < > 7.2*   HCT 28.4* 28.2* 26.9* 24.0*  --  23.9*   MCV 96 95 96  --   --  100   MCH 29.3 29.6 29.5  --   --  30.0   MCHC 30.6* 31.2* 30.9*  --   --  30.1*   RDW 16.2* 16.6* 16.8*  --   --  17.4*   * 125* 128*  --   --  153    < > = values in this interval not displayed.       Patient discussed with Dr. Conte.      Shelia Means, DNP, FNP-BC, CHFN  Advanced Heart Failure Nurse  Practitioner  [facility]

## 2021-05-30 ENCOUNTER — APPOINTMENT (OUTPATIENT)
Dept: OCCUPATIONAL THERAPY | Facility: CLINIC | Age: 66
DRG: 001 | End: 2021-05-30
Attending: INTERNAL MEDICINE
Payer: COMMERCIAL

## 2021-05-30 ENCOUNTER — APPOINTMENT (OUTPATIENT)
Dept: PHYSICAL THERAPY | Facility: CLINIC | Age: 66
DRG: 001 | End: 2021-05-30
Attending: INTERNAL MEDICINE
Payer: COMMERCIAL

## 2021-05-30 LAB
ANION GAP SERPL CALCULATED.3IONS-SCNC: 9 MMOL/L (ref 3–14)
BUN SERPL-MCNC: 30 MG/DL (ref 7–30)
CALCIUM SERPL-MCNC: 8.2 MG/DL (ref 8.5–10.1)
CHLORIDE SERPL-SCNC: 102 MMOL/L (ref 94–109)
CO2 SERPL-SCNC: 28 MMOL/L (ref 20–32)
CREAT SERPL-MCNC: 1.64 MG/DL (ref 0.66–1.25)
ERYTHROCYTE [DISTWIDTH] IN BLOOD BY AUTOMATED COUNT: 16 % (ref 10–15)
GFR SERPL CREATININE-BSD FRML MDRD: 44 ML/MIN/{1.73_M2}
GLUCOSE BLDC GLUCOMTR-MCNC: 143 MG/DL (ref 70–99)
GLUCOSE BLDC GLUCOMTR-MCNC: 181 MG/DL (ref 70–99)
GLUCOSE BLDC GLUCOMTR-MCNC: 193 MG/DL (ref 70–99)
GLUCOSE BLDC GLUCOMTR-MCNC: 295 MG/DL (ref 70–99)
GLUCOSE BLDC GLUCOMTR-MCNC: 79 MG/DL (ref 70–99)
GLUCOSE SERPL-MCNC: 174 MG/DL (ref 70–99)
HCT VFR BLD AUTO: 30.1 % (ref 40–53)
HGB BLD-MCNC: 9.3 G/DL (ref 13.3–17.7)
MAGNESIUM SERPL-MCNC: 1.9 MG/DL (ref 1.6–2.3)
MCH RBC QN AUTO: 28.9 PG (ref 26.5–33)
MCHC RBC AUTO-ENTMCNC: 30.9 G/DL (ref 31.5–36.5)
MCV RBC AUTO: 94 FL (ref 78–100)
PLATELET # BLD AUTO: 116 10E9/L (ref 150–450)
POTASSIUM SERPL-SCNC: 3.3 MMOL/L (ref 3.4–5.3)
RBC # BLD AUTO: 3.22 10E12/L (ref 4.4–5.9)
SODIUM SERPL-SCNC: 139 MMOL/L (ref 133–144)
TACROLIMUS BLD-MCNC: 8.9 UG/L (ref 5–15)
TME LAST DOSE: NORMAL H
WBC # BLD AUTO: 7 10E9/L (ref 4–11)

## 2021-05-30 PROCEDURE — 250N000013 HC RX MED GY IP 250 OP 250 PS 637

## 2021-05-30 PROCEDURE — 94640 AIRWAY INHALATION TREATMENT: CPT | Mod: 76

## 2021-05-30 PROCEDURE — 97535 SELF CARE MNGMENT TRAINING: CPT | Mod: GO | Performed by: OCCUPATIONAL THERAPIST

## 2021-05-30 PROCEDURE — 80048 BASIC METABOLIC PNL TOTAL CA: CPT | Performed by: PHYSICIAN ASSISTANT

## 2021-05-30 PROCEDURE — 80197 ASSAY OF TACROLIMUS: CPT | Performed by: PHYSICIAN ASSISTANT

## 2021-05-30 PROCEDURE — 250N000013 HC RX MED GY IP 250 OP 250 PS 637: Performed by: PHYSICIAN ASSISTANT

## 2021-05-30 PROCEDURE — 250N000013 HC RX MED GY IP 250 OP 250 PS 637: Performed by: NURSE PRACTITIONER

## 2021-05-30 PROCEDURE — 94640 AIRWAY INHALATION TREATMENT: CPT

## 2021-05-30 PROCEDURE — 250N000013 HC RX MED GY IP 250 OP 250 PS 637: Performed by: STUDENT IN AN ORGANIZED HEALTH CARE EDUCATION/TRAINING PROGRAM

## 2021-05-30 PROCEDURE — 250N000011 HC RX IP 250 OP 636: Performed by: NURSE PRACTITIONER

## 2021-05-30 PROCEDURE — 250N000009 HC RX 250: Performed by: PHYSICIAN ASSISTANT

## 2021-05-30 PROCEDURE — 99232 SBSQ HOSP IP/OBS MODERATE 35: CPT | Performed by: INTERNAL MEDICINE

## 2021-05-30 PROCEDURE — 999N001017 HC STATISTIC GLUCOSE BY METER IP

## 2021-05-30 PROCEDURE — 250N000011 HC RX IP 250 OP 636: Performed by: SURGERY

## 2021-05-30 PROCEDURE — 36592 COLLECT BLOOD FROM PICC: CPT | Performed by: PHYSICIAN ASSISTANT

## 2021-05-30 PROCEDURE — 214N000001 HC R&B CCU UMMC

## 2021-05-30 PROCEDURE — 97110 THERAPEUTIC EXERCISES: CPT | Mod: GP

## 2021-05-30 PROCEDURE — 250N000011 HC RX IP 250 OP 636: Performed by: PHYSICIAN ASSISTANT

## 2021-05-30 PROCEDURE — 83735 ASSAY OF MAGNESIUM: CPT | Performed by: PHYSICIAN ASSISTANT

## 2021-05-30 PROCEDURE — 999N000044 HC STATISTIC CVC DRESSING CHANGE

## 2021-05-30 PROCEDURE — 250N000012 HC RX MED GY IP 250 OP 636 PS 637: Performed by: NURSE PRACTITIONER

## 2021-05-30 PROCEDURE — 85027 COMPLETE CBC AUTOMATED: CPT | Performed by: PHYSICIAN ASSISTANT

## 2021-05-30 PROCEDURE — 97530 THERAPEUTIC ACTIVITIES: CPT | Mod: GP

## 2021-05-30 PROCEDURE — 250N000012 HC RX MED GY IP 250 OP 636 PS 637: Performed by: PHYSICIAN ASSISTANT

## 2021-05-30 PROCEDURE — 250N000013 HC RX MED GY IP 250 OP 250 PS 637: Performed by: SURGERY

## 2021-05-30 RX ORDER — POTASSIUM CHLORIDE 750 MG/1
40 TABLET, EXTENDED RELEASE ORAL ONCE
Status: COMPLETED | OUTPATIENT
Start: 2021-05-30 | End: 2021-05-30

## 2021-05-30 RX ORDER — MYCOPHENOLATE MOFETIL 250 MG/1
1500 CAPSULE ORAL
Status: DISCONTINUED | OUTPATIENT
Start: 2021-05-30 | End: 2021-05-31 | Stop reason: HOSPADM

## 2021-05-30 RX ORDER — MAGNESIUM SULFATE HEPTAHYDRATE 40 MG/ML
2 INJECTION, SOLUTION INTRAVENOUS ONCE
Status: COMPLETED | OUTPATIENT
Start: 2021-05-30 | End: 2021-05-30

## 2021-05-30 RX ORDER — POTASSIUM CHLORIDE 1500 MG/1
60 TABLET, EXTENDED RELEASE ORAL 2 TIMES DAILY
Status: DISCONTINUED | OUTPATIENT
Start: 2021-05-30 | End: 2021-05-31

## 2021-05-30 RX ADMIN — FLUTICASONE FUROATE AND VILANTEROL TRIFENATATE 1 PUFF: 100; 25 POWDER RESPIRATORY (INHALATION) at 08:39

## 2021-05-30 RX ADMIN — PIPERACILLIN AND TAZOBACTAM 3.38 G: 3; .375 INJECTION, POWDER, LYOPHILIZED, FOR SOLUTION INTRAVENOUS at 09:10

## 2021-05-30 RX ADMIN — BUMETANIDE 3 MG: 2 TABLET ORAL at 08:31

## 2021-05-30 RX ADMIN — POTASSIUM CHLORIDE 60 MEQ: 1500 TABLET, EXTENDED RELEASE ORAL at 20:36

## 2021-05-30 RX ADMIN — MYCOPHENOLATE MOFETIL 1000 MG: 250 CAPSULE ORAL at 08:36

## 2021-05-30 RX ADMIN — ALBUTEROL SULFATE 2.5 MG: 2.5 SOLUTION RESPIRATORY (INHALATION) at 12:20

## 2021-05-30 RX ADMIN — Medication 50 MG: at 22:02

## 2021-05-30 RX ADMIN — ALBUTEROL SULFATE 2.5 MG: 2.5 SOLUTION RESPIRATORY (INHALATION) at 15:55

## 2021-05-30 RX ADMIN — HEPARIN SODIUM 5000 UNITS: 5000 INJECTION, SOLUTION INTRAVENOUS; SUBCUTANEOUS at 16:20

## 2021-05-30 RX ADMIN — HEPARIN SODIUM 5000 UNITS: 5000 INJECTION, SOLUTION INTRAVENOUS; SUBCUTANEOUS at 09:12

## 2021-05-30 RX ADMIN — Medication 50 MG: at 22:03

## 2021-05-30 RX ADMIN — PIPERACILLIN AND TAZOBACTAM 3.38 G: 3; .375 INJECTION, POWDER, LYOPHILIZED, FOR SOLUTION INTRAVENOUS at 02:24

## 2021-05-30 RX ADMIN — ALBUTEROL SULFATE 2.5 MG: 2.5 SOLUTION RESPIRATORY (INHALATION) at 08:56

## 2021-05-30 RX ADMIN — HEPARIN SODIUM 5000 UNITS: 5000 INJECTION, SOLUTION INTRAVENOUS; SUBCUTANEOUS at 00:47

## 2021-05-30 RX ADMIN — ACETAMINOPHEN 975 MG: 325 TABLET, FILM COATED ORAL at 00:47

## 2021-05-30 RX ADMIN — PIPERACILLIN AND TAZOBACTAM 3.38 G: 3; .375 INJECTION, POWDER, LYOPHILIZED, FOR SOLUTION INTRAVENOUS at 20:36

## 2021-05-30 RX ADMIN — GABAPENTIN 600 MG: 300 CAPSULE ORAL at 14:36

## 2021-05-30 RX ADMIN — ACETAMINOPHEN 975 MG: 325 TABLET, FILM COATED ORAL at 08:32

## 2021-05-30 RX ADMIN — SULFAMETHOXAZOLE AND TRIMETHOPRIM 1 TABLET: 400; 80 TABLET ORAL at 18:25

## 2021-05-30 RX ADMIN — Medication 5 ML: at 14:37

## 2021-05-30 RX ADMIN — POTASSIUM CHLORIDE 40 MEQ: 750 TABLET, EXTENDED RELEASE ORAL at 08:31

## 2021-05-30 RX ADMIN — PANTOPRAZOLE SODIUM 40 MG: 40 TABLET, DELAYED RELEASE ORAL at 08:32

## 2021-05-30 RX ADMIN — BUPROPION HYDROCHLORIDE 75 MG: 75 TABLET, FILM COATED ORAL at 14:36

## 2021-05-30 RX ADMIN — HEPARIN SODIUM 5000 UNITS: 5000 INJECTION, SOLUTION INTRAVENOUS; SUBCUTANEOUS at 23:53

## 2021-05-30 RX ADMIN — NYSTATIN 1000000 UNITS: 500000 SUSPENSION ORAL at 16:19

## 2021-05-30 RX ADMIN — GABAPENTIN 600 MG: 300 CAPSULE ORAL at 22:03

## 2021-05-30 RX ADMIN — NYSTATIN 1000000 UNITS: 500000 SUSPENSION ORAL at 12:14

## 2021-05-30 RX ADMIN — ROSUVASTATIN CALCIUM 10 MG: 10 TABLET, FILM COATED ORAL at 08:31

## 2021-05-30 RX ADMIN — MONTELUKAST 10 MG: 10 TABLET, FILM COATED ORAL at 22:02

## 2021-05-30 RX ADMIN — PIPERACILLIN AND TAZOBACTAM 3.38 G: 3; .375 INJECTION, POWDER, LYOPHILIZED, FOR SOLUTION INTRAVENOUS at 14:35

## 2021-05-30 RX ADMIN — ASPIRIN 81 MG CHEWABLE TABLET 81 MG: 81 TABLET CHEWABLE at 08:31

## 2021-05-30 RX ADMIN — ACETAMINOPHEN 975 MG: 325 TABLET, FILM COATED ORAL at 16:19

## 2021-05-30 RX ADMIN — MAGNESIUM SULFATE HEPTAHYDRATE 2 G: 40 INJECTION, SOLUTION INTRAVENOUS at 12:14

## 2021-05-30 RX ADMIN — Medication 37.5 MG: at 22:02

## 2021-05-30 RX ADMIN — PREDNISONE 20 MG: 20 TABLET ORAL at 08:33

## 2021-05-30 RX ADMIN — POTASSIUM CHLORIDE 40 MEQ: 750 TABLET, EXTENDED RELEASE ORAL at 12:12

## 2021-05-30 RX ADMIN — HEPARIN SODIUM 5000 UNITS: 5000 INJECTION, SOLUTION INTRAVENOUS; SUBCUTANEOUS at 09:11

## 2021-05-30 RX ADMIN — PANTOPRAZOLE SODIUM 40 MG: 40 TABLET, DELAYED RELEASE ORAL at 20:39

## 2021-05-30 RX ADMIN — Medication 1 TABLET: at 08:31

## 2021-05-30 RX ADMIN — TACROLIMUS 4 MG: 1 CAPSULE ORAL at 08:40

## 2021-05-30 RX ADMIN — UMECLIDINIUM 1 PUFF: 62.5 AEROSOL, POWDER ORAL at 08:39

## 2021-05-30 RX ADMIN — ALBUTEROL SULFATE 2.5 MG: 2.5 SOLUTION RESPIRATORY (INHALATION) at 21:16

## 2021-05-30 RX ADMIN — BUMETANIDE 3 MG: 2 TABLET ORAL at 16:19

## 2021-05-30 RX ADMIN — NYSTATIN 1000000 UNITS: 500000 SUSPENSION ORAL at 08:37

## 2021-05-30 RX ADMIN — TACROLIMUS 4 MG: 1 CAPSULE ORAL at 18:25

## 2021-05-30 RX ADMIN — ACETAMINOPHEN 975 MG: 325 TABLET, FILM COATED ORAL at 23:52

## 2021-05-30 RX ADMIN — Medication 10 MG: at 22:02

## 2021-05-30 RX ADMIN — BUPROPION HYDROCHLORIDE 75 MG: 75 TABLET, FILM COATED ORAL at 08:31

## 2021-05-30 RX ADMIN — PREDNISONE 15 MG: 5 TABLET ORAL at 18:25

## 2021-05-30 RX ADMIN — ALLOPURINOL 300 MG: 300 TABLET ORAL at 08:31

## 2021-05-30 RX ADMIN — INSULIN ASPART 5 UNITS: 100 INJECTION, SOLUTION INTRAVENOUS; SUBCUTANEOUS at 08:47

## 2021-05-30 RX ADMIN — VALGANCICLOVIR 450 MG: 450 TABLET, FILM COATED ORAL at 08:36

## 2021-05-30 RX ADMIN — NYSTATIN 1000000 UNITS: 500000 SUSPENSION ORAL at 20:39

## 2021-05-30 RX ADMIN — INSULIN ASPART 2 UNITS: 100 INJECTION, SOLUTION INTRAVENOUS; SUBCUTANEOUS at 17:17

## 2021-05-30 RX ADMIN — Medication 1 TABLET: at 20:39

## 2021-05-30 RX ADMIN — MYCOPHENOLATE MOFETIL 1500 MG: 250 CAPSULE ORAL at 18:25

## 2021-05-30 RX ADMIN — GABAPENTIN 300 MG: 300 CAPSULE ORAL at 08:30

## 2021-05-30 ASSESSMENT — ACTIVITIES OF DAILY LIVING (ADL)
ADLS_ACUITY_SCORE: 14
ADLS_ACUITY_SCORE: 14
ADLS_ACUITY_SCORE: 12
ADLS_ACUITY_SCORE: 14
ADLS_ACUITY_SCORE: 14
ADLS_ACUITY_SCORE: 12

## 2021-05-30 ASSESSMENT — MIFFLIN-ST. JEOR: SCORE: 1724.78

## 2021-05-30 NOTE — PLAN OF CARE
Patient without complaints this evening. Up in chair. Denies pain.   Vitals stable, unchanged.    Possible discharge tomorrow.  Patient stating multiple times he does not want to be discharged to home until his loose stools are well controlled. Commode at bedside for overnight bathroom use due to leg weakness event last night. Normally up independently with steady gait.

## 2021-05-30 NOTE — CONSULTS
[facility]   Cardiology II Service / Advanced Heart Failure  Consult Note  Date of Service: 5/30/2021      Patient: Jim Willingham  MRN: [id number]  Admission Date: 2/8/2021  Hospital Day # 111    Assessment and Plan:  Mr. Jim Willingham is a 65yr old male with a history of NICM (LVEF < 30%) s/p HM2 LVAD (6/2017), VT, AFib, DMII, CKD, and COPD who presented with decompensated heart failure and is now s/p OHT 5/6/21.      RECOMMENDATIONS:  - increase MMF to 1500mg twice daily  - continue tacro 4mg twice daily  - will repeat labs and tac level on Wednesday --> message sent to transplant coordinators  - biopsy results from 5/27 are in process  - anticipate discharge Monday (will have completed 10 days of antibiotics)        NICM, s/p OHT 5/6/21  His post-transplant course has been c/b persistent leukocytosis largely due to steroids, and pneumonia (treated with 10d antibiotic course).    Biopsies have remained negative for rejection.  Last RHC 5/27 (weight 226#) showed mildly elevated biventricular filling pressures, with RA 11, mPA 33, PCW 23, and CI 3.09.  TTE 5/14 showed stable graft function, with LVEF > 70% and normal RV size/function.      Serostatus:  - CMV D+/R+  - EBV D+/R+  - Toxo D-/R-    Immunosuppression:  - received Simulect  - prednisone taper --> weaned to 20mg/15mg on 5/28 (to reflect negative biopsy from 5/20).  Biopsy results from 5/27 are in process --> will need to wean prednisone pending results.  - increase MMF to 1500mg twice daily  - tacrolimus, goal level 10-12.  Tac level today was 8.9. reflecting an ~11.5hr trough.  Continue 4mg twice daily, and will repeat level as outpatient on Wednesday (message sent to tx coordinators)    PPx:  - CAV:  Aspirin 81mg daily and rosuvastatin 10mg daily  - GI:  Pantoprazole 40mg twice daily  - Osteoporosis:  Calcium/vitamin D supplements  - CMV:  valcyte 450mg daily (x3 months, through August 6, 2021)  - PCP:  Restart bactrim single strength q48  "hours  - Thrush:  Nystatin s/s 5x/day  - Toxo:  N/a    Graft function:  - BPs:  Controlled, not on antihypertensives  - HRs:  Stable, off terbutaline  - fluid status:  Euvolemic, continue bumex 3mg po twice daily    Health maintenance:  - awaiting biopsy results from 5/27  - labs on Wednesday  - scheduled for week #4 surveillance 6/7      =============================================================    Interval History/ROS:   Mr. Willingham states that he feels better today.  His fluid status feels well.  He is eating, with improving appetite, and denies nausea and vomiting.  His stools are more formed today.  He has been walking, and notes improvements in his strength and endurance.  His breathing has been good, and he denies SOB, PND, and orthopnea.  He otherwise denies chest pain, palpitations, dizziness, headaches, acute vision changes, fevers, chills, cough, sore throat, and signs of bleeding.    Last 24 hr care team notes reviewed.   ROS:  4 point ROS including Respiratory, CV, GI and , other than that noted in the HPI, is negative.     Medications: Reviewed in EPIC.     Physical Exam:   BP 95/62 (BP Location: Left arm)   Pulse 101   Temp 97.6  F (36.4  C) (Oral)   Resp 16   Ht 1.727 m (5' 8\")   Wt 95.5 kg (210 lb 9.6 oz)   SpO2 98%   BMI 32.02 kg/m    GENERAL: Appears alert and oriented times three. Sitting upright in chair, NAD.  HEENT: Eye symmetrical and free of discharge bilaterally. Mucous membranes moist and without lesions. No thrush noted on tongue or cheeks.  NECK: Supple and without lymphadenopathy. JVD negative when sitting upright.   CV: RRR, S1S2 present without murmur, rub, or gallop.   RESPIRATORY: Respirations regular, even, and unlabored. Lungs CTA throughout.   GI: Soft and non distended with normoactive bowel sounds present in all quadrants. No tenderness, rebound, guarding. No organomegaly.   EXTREMITIES: No peripheral edema. 2+ bilateral pedal pulses.   NEUROLOGIC: Alert and " orientated x 3. CN II-XII grossly intact. No focal deficits.   MUSCULOSKELETAL: No joint swelling or tenderness.   SKIN: No jaundice. No rashes or lesions. Surgical incisions pink, with edges approximated, no redness or drainage.    Data:  CMP  Recent Labs   Lab 05/30/21 0526 05/29/21 2027 05/29/21 0606 05/28/21 2229 05/28/21  0531 05/26/21  1340 05/26/21  1340 05/24/21  0544 05/24/21  0544     --  139 138 135   < >  --    < > 137   POTASSIUM 3.3*  --  3.2* 3.1* 3.5   < >  --    < > 3.9   CHLORIDE 102  --  103 102 101   < >  --    < > 103   CO2 28  --  28 28 29   < >  --    < > 28   ANIONGAP 9  --  8 7 6   < >  --    < > 6   *  --  232* 197* 230*   < >  --    < > 86   BUN 30  --  33* 34* 36*   < >  --    < > 59*   CR 1.64*  --  1.69* 1.68* 1.58*   < >  --    < > 1.97*   GFRESTIMATED 44*  --  42* 42* 46*   < >  --    < > 35*   GFRESTBLACK 50*  --  49* 49* 53*   < >  --    < > 40*   QAMAR 8.2*  --  8.1* 8.2* 8.5   < >  --    < > 8.2*   MAG 1.9 2.0 1.9 2.0 1.7   < >  --    < > 2.0   PROTTOTAL  --   --   --   --   --   --  4.9*  --  5.5*   ALBUMIN  --   --   --   --   --   --  2.2*  --  2.8*   BILITOTAL  --   --   --   --   --   --  0.6  --  0.8   ALKPHOS  --   --   --   --   --   --  123  --  147   AST  --   --   --   --   --   --  26  --  28   ALT  --   --   --   --   --   --  52  --  58    < > = values in this interval not displayed.     CBC  Recent Labs   Lab 05/30/21 0526 05/29/21  0606 05/28/21  0531 05/27/21  0449   WBC 7.0 6.8 10.2 11.7*   RBC 3.22* 2.97* 2.97* 2.81*   HGB 9.3* 8.7* 8.8* 8.3*   HCT 30.1* 28.4* 28.2* 26.9*   MCV 94 96 95 96   MCH 28.9 29.3 29.6 29.5   MCHC 30.9* 30.6* 31.2* 30.9*   RDW 16.0* 16.2* 16.6* 16.8*   * 124* 125* 128*       Patient discussed with Dr. Conte.      Shelia Means, JOSE, FNP-BC, CHFN  Advanced Heart Failure Nurse Practitioner  [facility]      Late entry - pt seen on 5/30/21  I have seen and examined the patient as part of a shared  visit with Shelia Means NP.  I agree with the note above. I reviewed today's vital signs and medications. I have reviewed and discussed with the advanced practice provider their physical examination, assessment, and plan   Briefly, 64 yo male with h/o NICM s/p HM2 LVAD now s/p OHT 5/6/21.  My key history/exam findings are: hemodynamically stable.  Grossly euvolemic on exam today.  The key management decisions made by me: increase cellcept to 1500mg bid.  Anticipate discharge tomorrow following completion for IV antibiotic course.    Rose Conte MD  Section Head - Advanced Heart Failure, Transplantation and Mechanical Circulatory Support  Director - [facility]  Associate Professor of Medicine, [facility]

## 2021-05-30 NOTE — PROGRESS NOTES
Cardiovascular Surgery Progress Note  05/30/2021         Assessment and Plan:     Mr. Jim Willingham is a 65 year old male with history of NICM EF 20% c/b VT s/p CRT-D s/p HMII LVAD 6/19/2017 originally intended as destination therapy 2/2 obesity however with recent weight loss now candidate for transplantation. He was admitted 2/8/21 for worsening functional status and renal function concerning for worsening heart failure. He was eventually listed status 2E for transplant and received a donor offer.   Jim is now s/p orthotopic heart transplant on 5/6/21 with Dr. Ronnie Quigley. He was shocked > 2x received amio, mag and lidocaine while coming off bypass. Known lung injury with isolated air leak to right pleural that eventually resolved, CT removed 5/23.      Cardiovascular:   Hx of NICM EF 20%, VT (ICD)  Hx HeartMate II LVAD 6/19/2017  Cardiorenal syndrome   S/P orthotopic heart transplant   - No arrhythmia, HD stable.  Most recent echo showed LV EF 65-70% on 5/9/21.  - Biopsy 5/20 - 0R, no AMR, Biopsy 5/27 pending  - Basiliximab 5/10.  Terbutaline weaned to off.    - ASA 81 mg, Crestor 10 mg daily.   - Elevated filling pressures on RHC 5/27. Diuresis per cardiology, Bumex 2 mg PO BID increased to 3 mg IV BID, now back on oral bumex 5/29. Continue per cards II. Lymphedema wraps 5/18, improving.   - Chest tubes: Airleak was isolated to Right pleural. Had some CP after walking off suction 5/13 with associated subcutaneous emphysema, resolved 5/22 and tube removed 5/23 without complication. Stable left hemidiaphragm elevation.   - TPW: removed.       Pulmonary:  Hx COPD and CECILIA on CPAP  Left hemidiaphragm elevation   Air leak from Right pleural tube (unintended injury during surgery)   Possible HAP - persistent opacity in Left retrocardiac base  - PTA Breo ellipta, Incruse ellipta, and Singulair   - Extubated POD 1 to 4 lpm via NC. Now saturating well on RA.   - Pulm toilet, IS, activity and deep breathing  - R  pleural fluid Triglycerides normal on 5/17 (first day of increased drainage)   - Pneumonia-CT notable for increased multifocal groundglass and consolidative opacities.- cont abx as below     Neurology / MSK:  Hx Depression  - PTA bupropion and amitriptyline  Post-op Status  - Neuro intact  - Acute post-operative pain well controlled with acetaminophen, PO dilaudid PRN, IV dilaudid PRN  R Hip Pain   - Had R hip pain increased over 2 days, was keeping him from raising legs into bed independently. Pain with flexion and abduction. Hip X-ray 5/16 without acute concerns, tried ice and pain has resolved.        / Renal:  WALTER on CKD stage III   - Cardiorenal syndrome improving. Most recent creatinine 1.64, stable.    - Day before surgery weight 102.3 kg. Today 95 kg  - Diuresis per cardiology-switched to oral Bumex 5/29.      GI / FEN:   Hx Sylvester-en-Y gastric bypass  Non-severe Mild Protein-Calorie Malnutrition   - No Hx N/V or short gut syndrome after RNY (2003).   - Regular diet with supplements, continue bowel regimen.   - Replace electrolytes as needed, hepatic enzymes WNL.  Nausea with Emesis   Had BM 5/18 with lactulose, dosed again 5/24 per patient request. Suppository 5/25 AM with + BM but still feeling nauseated after BM. No clear obstructive signs on Abd Xray. Placed NG tube 5/25 with low intermittent suction. Had a gastrograffin challenge 5/25, contrast traveled to colon. He is feeling better 5/26 am. Minimal output from NG tube and having antegrade bowel function. NG tube removed 5/27  - Continues to do well without n/v  - patient now with loose stools, patient cutting back on bowel regimen.      Endocrine:  Gout   - PTA allopurinol.  Stress induced hyperglycemia  DMT2, was on Insulin PTA  Initially managed on insulin drip postop, transitioned to NPH BID and sliding scale. Endocrine consulted, signed off 5/9. Converted to Lantus 5/14 am, continue prandial and high resistance sliding corrective scale. Lantus  held due to poor intake and hypoglycemia. Now hyperglycemic  - Restart lantus at 15U daily (5/28), titrate as able, increase to Lantus 20u 5/29  - Continue sliding scale insulin  - May need to reconsult endo if no improvement with increased Lantus.     Infectious Disease:  Stress induced leukocytosis  HAP, persistent left lower base opacity   CT Ch/ Abd/ Pelvis 5/18 done for persistent elevated WBC; small consolidative opacity in LL base with ground glass opacities in both lung bases, ? atypical infection with some degree of atelectasis. WBC had been decreasing since, saturating well, no cough, afebrile. CXR 5/22 confirming his persistent elevated Left sandor-diaphragm. CXR 5/25 with evolving LLL opacity suggesting possible aspiration. He was started on levaquin on 5/22, which was switched to zosyn 5/25 given worsening leukocytosis. Sputum/UA sent 5/25, blood cultures 5/26. CPR and WBC trending down  - Continue zosyn until 5/31.   - Continue to follow cultures, NGTD   - WBC WNL, afebrile     Hematology:   Acute blood loss anemia and thrombocytopenia  - Hgb stable, Plts WNL, no signs or symptoms of active bleeding     Prophylaxis:   - Stress ulcer prophylaxis: Pantoprazole 40 mg BID for Hx RNY and nausea   - DVT prophylaxis: Subcutaneous heparin, SCD     Disposition:   - Transferred to  on 5/10   - Therapies currently recommending discharge to home with home care    Discussed with CVTS Fellow as needed.  Staff surgeons have been informed of changes through both verbal and written communication.      lCara Ponce PA-C  Cardiothoracic Surgery  Pager [phone]  May 30, 2021          Interval History:     No overnight events. Says he feels great  States pain is well managed on current regimen. Slept ok overnight.  Tolerating diet  He is passing flatus, Loose BM's improved today. Holding bowel regimen. NG now out, no N/V. Denies chest pain, palpitations, dizziness, syncopal symptoms, fevers, chills, myalgias,  "or sternal popping/clicking.         Physical Exam:   Blood pressure 95/62, pulse 101, temperature 97.6  F (36.4  C), temperature source Oral, resp. rate 16, height 1.727 m (5' 8\"), weight 95.5 kg (210 lb 9.6 oz), SpO2 98 %.  Vitals:    05/28/21 0402 05/29/21 0815 05/30/21 0818   Weight: 99.2 kg (218 lb 9.6 oz) 97.8 kg (215 lb 9.6 oz) 95.5 kg (210 lb 9.6 oz)      Gen: A&Ox4, NAD  Neuro: no focal deficits but looks tired  CV: RRR, normal S1 S2, no murmurs, rubs or gallops.   Pulm: CTA, soft expiratory wheezing without rhonchi, normal breathing on RA  Abd: nondistended, normal BS, soft, nontender  Ext: trace LE edema   Incision: clean, dry, intact, no erythema, sternum stable  Tubes/drain sites: dressing clean and dry         Data:    Imaging:  reviewed recent imaging, no acute concerns  No results found for this or any previous visit (from the past 24 hour(s)).    Labs:  BMP  Recent Labs   Lab 05/30/21  0526 05/29/21  0606 05/28/21 2229 05/28/21  0531    139 138 135   POTASSIUM 3.3* 3.2* 3.1* 3.5   CHLORIDE 102 103 102 101   QAMAR 8.2* 8.1* 8.2* 8.5   CO2 28 28 28 29   BUN 30 33* 34* 36*   CR 1.64* 1.69* 1.68* 1.58*   * 232* 197* 230*     CBC  Recent Labs   Lab 05/30/21  0526 05/29/21  0606 05/28/21  0531 05/27/21  0449   WBC 7.0 6.8 10.2 11.7*   RBC 3.22* 2.97* 2.97* 2.81*   HGB 9.3* 8.7* 8.8* 8.3*   HCT 30.1* 28.4* 28.2* 26.9*   MCV 94 96 95 96   MCH 28.9 29.3 29.6 29.5   MCHC 30.9* 30.6* 31.2* 30.9*   RDW 16.0* 16.2* 16.6* 16.8*   * 124* 125* 128*     INR  No lab results found in last 7 days.   Hepatic Panel  Recent Labs   Lab 05/26/21  1340 05/24/21  0544   AST 26 28   ALT 52 58   ALKPHOS 123 147   BILITOTAL 0.6 0.8   ALBUMIN 2.2* 2.8*     GLUCOSE:   Recent Labs   Lab 05/30/21  0737 05/30/21  0526 05/30/21  0228 05/29/21  2156 05/29/21  1630 05/29/21  1500 05/29/21  1313 05/29/21  0606 05/29/21  0606 05/28/21  2229 05/28/21  2229 05/28/21  0531 05/28/21  0531 05/27/21  1840 05/27/21  1840 " 05/27/21  0449 05/27/21  0449   GLC  --  174*  --   --   --   --   --   --  232*  --  197*  --  230*  --  294*  --  264*   *  --  193* 223* 195* 191* 202*   < >  --    < >  --    < >  --    < >  --    < >  --     < > = values in this interval not displayed.

## 2021-05-30 NOTE — PROGRESS NOTES
"D:Up in chair most of day.   Went walking a short distance in halls with wife.  Reports still feeling some weakness,  But that it is improving.   Has had three \"watery stools\".   No complaints of  Pain.   Rhythm is  SR/ST 90's-100's.   Bp   118/77 MAP 92  Temp 98.1.      A: Is doing well.  Rhythm is stable.  AVSS.  O2 sat is normal on room air.  Condition is improving.    P:Continue to monitor for signs of infections.  Monitor rhythm,temp and vital signs and O2 sats.  "

## 2021-05-30 NOTE — PROGRESS NOTES
Pt in bed listening to music on initial assessment. Denied pain, nausea. VSS, NSR 90s to ST 100s.  Up independently with walker. Voiding into urinal clear yellow urine, commode at bedside for urgency to have BM. No major changes overnight. Plan to discharge Sunday or Monday, but pt stated he would like to stay until his next biopsy on Tuesday.

## 2021-05-30 NOTE — PROGRESS NOTES
"D:Up out of bed sitting in chair.   Had minimal assist with shower per OT.   Commented,  \"I haven't been  Able to  Take a shower in a long time.  That felt good\".   No complaints of pain.  No shortness of breath.   Actively participating in physical therapy  And cardiac rehab.   Ambulated to cardiac rehab room and made it half way back.  Reports legs are \"still weak and my stamina is improving\".   Rhythm is 's.  No ectopy.   K+3.3 ,  Mg 1.9,  Both were replaced per protocol.   Tac level 8.9.    A:  Reports today  Is \"the best I have felt\".   Tolerating increased activity well.   Reported having done stairs in  Rehab room.   Rhythm  Is stable.   AVSS.  O2 sat is normal on room ait.     P:Continue  With current plan of care.    Monitor temp and vital signs and rhythm.   UAL with walker in room.    Plan for discharge to home  Tomorrow.   Per NP Cards 2.  "

## 2021-05-31 ENCOUNTER — TELEPHONE (OUTPATIENT)
Dept: TRANSPLANT | Facility: CLINIC | Age: 66
End: 2021-05-31

## 2021-05-31 ENCOUNTER — PATIENT OUTREACH (OUTPATIENT)
Dept: CARE COORDINATION | Facility: CLINIC | Age: 66
End: 2021-05-31

## 2021-05-31 VITALS
OXYGEN SATURATION: 100 % | HEART RATE: 105 BPM | DIASTOLIC BLOOD PRESSURE: 93 MMHG | TEMPERATURE: 96 F | WEIGHT: 208.2 LBS | BODY MASS INDEX: 31.55 KG/M2 | SYSTOLIC BLOOD PRESSURE: 129 MMHG | HEIGHT: 68 IN | RESPIRATION RATE: 18 BRPM

## 2021-05-31 DIAGNOSIS — Z11.59 ENCOUNTER FOR SCREENING FOR OTHER VIRAL DISEASES: ICD-10-CM

## 2021-05-31 LAB
ALBUMIN SERPL-MCNC: 2.7 G/DL (ref 3.4–5)
ALP SERPL-CCNC: 184 U/L (ref 40–150)
ALT SERPL W P-5'-P-CCNC: 82 U/L (ref 0–70)
ANION GAP SERPL CALCULATED.3IONS-SCNC: 5 MMOL/L (ref 3–14)
AST SERPL W P-5'-P-CCNC: 35 U/L (ref 0–45)
BILIRUB DIRECT SERPL-MCNC: 0.2 MG/DL (ref 0–0.2)
BILIRUB SERPL-MCNC: 0.8 MG/DL (ref 0.2–1.3)
BUN SERPL-MCNC: 31 MG/DL (ref 7–30)
CALCIUM SERPL-MCNC: 8.2 MG/DL (ref 8.5–10.1)
CHLORIDE SERPL-SCNC: 104 MMOL/L (ref 94–109)
CO2 SERPL-SCNC: 28 MMOL/L (ref 20–32)
CREAT SERPL-MCNC: 1.78 MG/DL (ref 0.66–1.25)
ERYTHROCYTE [DISTWIDTH] IN BLOOD BY AUTOMATED COUNT: 16 % (ref 10–15)
GFR SERPL CREATININE-BSD FRML MDRD: 39 ML/MIN/{1.73_M2}
GLUCOSE BLDC GLUCOMTR-MCNC: 151 MG/DL (ref 70–99)
GLUCOSE BLDC GLUCOMTR-MCNC: 155 MG/DL (ref 70–99)
GLUCOSE SERPL-MCNC: 171 MG/DL (ref 70–99)
HCT VFR BLD AUTO: 29.4 % (ref 40–53)
HGB BLD-MCNC: 9 G/DL (ref 13.3–17.7)
MAGNESIUM SERPL-MCNC: 2.1 MG/DL (ref 1.6–2.3)
MCH RBC QN AUTO: 29 PG (ref 26.5–33)
MCHC RBC AUTO-ENTMCNC: 30.6 G/DL (ref 31.5–36.5)
MCV RBC AUTO: 95 FL (ref 78–100)
PLATELET # BLD AUTO: 131 10E9/L (ref 150–450)
POTASSIUM SERPL-SCNC: 4 MMOL/L (ref 3.4–5.3)
PROT SERPL-MCNC: 5.4 G/DL (ref 6.8–8.8)
RBC # BLD AUTO: 3.1 10E12/L (ref 4.4–5.9)
SODIUM SERPL-SCNC: 137 MMOL/L (ref 133–144)
TACROLIMUS BLD-MCNC: 8.3 UG/L (ref 5–15)
TME LAST DOSE: NORMAL H
WBC # BLD AUTO: 7 10E9/L (ref 4–11)

## 2021-05-31 PROCEDURE — 84132 ASSAY OF SERUM POTASSIUM: CPT | Performed by: SURGERY

## 2021-05-31 PROCEDURE — 250N000012 HC RX MED GY IP 250 OP 636 PS 637: Performed by: NURSE PRACTITIONER

## 2021-05-31 PROCEDURE — 83735 ASSAY OF MAGNESIUM: CPT | Performed by: PHYSICIAN ASSISTANT

## 2021-05-31 PROCEDURE — 250N000013 HC RX MED GY IP 250 OP 250 PS 637: Performed by: PHYSICIAN ASSISTANT

## 2021-05-31 PROCEDURE — 250N000013 HC RX MED GY IP 250 OP 250 PS 637: Performed by: STUDENT IN AN ORGANIZED HEALTH CARE EDUCATION/TRAINING PROGRAM

## 2021-05-31 PROCEDURE — 250N000012 HC RX MED GY IP 250 OP 636 PS 637: Performed by: PHYSICIAN ASSISTANT

## 2021-05-31 PROCEDURE — 80197 ASSAY OF TACROLIMUS: CPT | Performed by: PHYSICIAN ASSISTANT

## 2021-05-31 PROCEDURE — 250N000013 HC RX MED GY IP 250 OP 250 PS 637

## 2021-05-31 PROCEDURE — 250N000011 HC RX IP 250 OP 636: Performed by: NURSE PRACTITIONER

## 2021-05-31 PROCEDURE — 80076 HEPATIC FUNCTION PANEL: CPT | Performed by: PHYSICIAN ASSISTANT

## 2021-05-31 PROCEDURE — 85027 COMPLETE CBC AUTOMATED: CPT | Performed by: PHYSICIAN ASSISTANT

## 2021-05-31 PROCEDURE — 250N000013 HC RX MED GY IP 250 OP 250 PS 637: Performed by: NURSE PRACTITIONER

## 2021-05-31 PROCEDURE — 250N000011 HC RX IP 250 OP 636: Performed by: PHYSICIAN ASSISTANT

## 2021-05-31 PROCEDURE — 36592 COLLECT BLOOD FROM PICC: CPT | Performed by: PHYSICIAN ASSISTANT

## 2021-05-31 PROCEDURE — 250N000009 HC RX 250: Performed by: PHYSICIAN ASSISTANT

## 2021-05-31 PROCEDURE — 94640 AIRWAY INHALATION TREATMENT: CPT | Mod: 76

## 2021-05-31 PROCEDURE — 80076 HEPATIC FUNCTION PANEL: CPT | Performed by: NURSE PRACTITIONER

## 2021-05-31 PROCEDURE — 999N001017 HC STATISTIC GLUCOSE BY METER IP

## 2021-05-31 PROCEDURE — 80048 BASIC METABOLIC PNL TOTAL CA: CPT | Performed by: PHYSICIAN ASSISTANT

## 2021-05-31 PROCEDURE — 250N000013 HC RX MED GY IP 250 OP 250 PS 637: Performed by: SURGERY

## 2021-05-31 RX ORDER — ASPIRIN 81 MG/1
81 TABLET, CHEWABLE ORAL DAILY
Qty: 100 TABLET | Refills: 1 | Status: ON HOLD | OUTPATIENT
Start: 2021-06-01 | End: 2024-01-01

## 2021-05-31 RX ORDER — POTASSIUM CHLORIDE 1500 MG/1
20 TABLET, EXTENDED RELEASE ORAL 2 TIMES DAILY
Qty: 60 TABLET | Refills: 1 | Status: ON HOLD | OUTPATIENT
Start: 2021-05-31 | End: 2021-07-05

## 2021-05-31 RX ORDER — TACROLIMUS 1 MG/1
4 CAPSULE ORAL 2 TIMES DAILY
Qty: 240 CAPSULE | Refills: 1 | Status: SHIPPED | OUTPATIENT
Start: 2021-05-31 | End: 2021-06-03

## 2021-05-31 RX ORDER — MYCOPHENOLATE MOFETIL 250 MG/1
1500 CAPSULE ORAL 2 TIMES DAILY
Qty: 360 CAPSULE | Refills: 1 | Status: SHIPPED | OUTPATIENT
Start: 2021-05-31 | End: 2021-06-08

## 2021-05-31 RX ORDER — MONTELUKAST SODIUM 10 MG/1
10 TABLET ORAL AT BEDTIME
Qty: 90 TABLET | Refills: 1 | Status: SHIPPED | OUTPATIENT
Start: 2021-05-31 | End: 2021-09-28

## 2021-05-31 RX ORDER — GABAPENTIN 300 MG/1
600 CAPSULE ORAL 2 TIMES DAILY
Qty: 60 CAPSULE | Refills: 1 | Status: ON HOLD | OUTPATIENT
Start: 2021-05-31 | End: 2021-07-05

## 2021-05-31 RX ORDER — PREDNISONE 10 MG/1
20 TABLET ORAL EVERY MORNING
Qty: 120 TABLET | Refills: 1 | Status: ON HOLD | OUTPATIENT
Start: 2021-05-31 | End: 2021-07-05

## 2021-05-31 RX ORDER — TRAMADOL HYDROCHLORIDE 50 MG/1
25-50 TABLET ORAL EVERY 6 HOURS PRN
Qty: 25 TABLET | Refills: 1 | Status: SHIPPED | OUTPATIENT
Start: 2021-05-31 | End: 2021-09-28

## 2021-05-31 RX ORDER — ROSUVASTATIN CALCIUM 10 MG/1
10 TABLET, COATED ORAL DAILY
Qty: 90 TABLET | Refills: 1 | Status: ON HOLD | OUTPATIENT
Start: 2021-06-01 | End: 2021-07-19

## 2021-05-31 RX ORDER — PANTOPRAZOLE SODIUM 40 MG/1
40 TABLET, DELAYED RELEASE ORAL 2 TIMES DAILY
Qty: 60 TABLET | Refills: 1 | Status: SHIPPED | OUTPATIENT
Start: 2021-05-31 | End: 2021-08-09

## 2021-05-31 RX ORDER — NYSTATIN 100000/ML
1000000 SUSPENSION, ORAL (FINAL DOSE FORM) ORAL 4 TIMES DAILY
Qty: 1200 ML | Refills: 1 | Status: SHIPPED | OUTPATIENT
Start: 2021-05-31 | End: 2021-06-15

## 2021-05-31 RX ORDER — PREDNISONE 5 MG/1
15 TABLET ORAL EVERY EVENING
Qty: 90 TABLET | Refills: 1 | Status: SHIPPED | OUTPATIENT
Start: 2021-05-31 | End: 2021-06-01

## 2021-05-31 RX ORDER — POTASSIUM CHLORIDE 1500 MG/1
60 TABLET, EXTENDED RELEASE ORAL DAILY
Status: DISCONTINUED | OUTPATIENT
Start: 2021-06-01 | End: 2021-05-31 | Stop reason: HOSPADM

## 2021-05-31 RX ORDER — SULFAMETHOXAZOLE AND TRIMETHOPRIM 400; 80 MG/1; MG/1
1 TABLET ORAL
Qty: 30 TABLET | Refills: 1 | Status: ON HOLD | OUTPATIENT
Start: 2021-05-31 | End: 2021-07-05

## 2021-05-31 RX ORDER — BUMETANIDE 1 MG/1
3 TABLET ORAL DAILY
Qty: 60 TABLET | Refills: 1 | Status: SHIPPED | OUTPATIENT
Start: 2021-06-01 | End: 2021-05-31

## 2021-05-31 RX ORDER — ALBUTEROL SULFATE 90 UG/1
2 AEROSOL, METERED RESPIRATORY (INHALATION) EVERY 4 HOURS PRN
Qty: 6.7 G | Refills: 1 | Status: SHIPPED | OUTPATIENT
Start: 2021-05-31 | End: 2021-08-09

## 2021-05-31 RX ORDER — ACETAMINOPHEN 325 MG/1
650 TABLET ORAL EVERY 4 HOURS PRN
Qty: 100 TABLET | Refills: 1 | Status: ON HOLD | OUTPATIENT
Start: 2021-05-31 | End: 2021-07-05

## 2021-05-31 RX ORDER — VALGANCICLOVIR 450 MG/1
450 TABLET, FILM COATED ORAL DAILY
Qty: 90 TABLET | Refills: 1 | Status: ON HOLD | OUTPATIENT
Start: 2021-06-01 | End: 2021-07-05

## 2021-05-31 RX ORDER — BUMETANIDE 1 MG/1
2 TABLET ORAL 2 TIMES DAILY
Qty: 60 TABLET | Refills: 1 | Status: SHIPPED | OUTPATIENT
Start: 2021-05-31 | End: 2021-06-08

## 2021-05-31 RX ORDER — BUPROPION HYDROCHLORIDE 100 MG/1
50 TABLET ORAL EVERY EVENING
Qty: 30 TABLET | Refills: 1 | Status: SHIPPED | OUTPATIENT
Start: 2021-05-31 | End: 2021-07-08

## 2021-05-31 RX ORDER — ALLOPURINOL 300 MG/1
300 TABLET ORAL DAILY
Qty: 90 TABLET | Refills: 1 | Status: SHIPPED | OUTPATIENT
Start: 2021-05-31 | End: 2021-10-05

## 2021-05-31 RX ORDER — GABAPENTIN 300 MG/1
300 CAPSULE ORAL EVERY MORNING
Qty: 30 CAPSULE | Refills: 1 | Status: SHIPPED | OUTPATIENT
Start: 2021-05-31 | End: 2021-10-05

## 2021-05-31 RX ORDER — BUPROPION HYDROCHLORIDE 75 MG/1
75 TABLET ORAL 2 TIMES DAILY
Qty: 60 TABLET | Refills: 1 | Status: SHIPPED | OUTPATIENT
Start: 2021-05-31 | End: 2021-09-09

## 2021-05-31 RX ADMIN — POTASSIUM CHLORIDE 60 MEQ: 1500 TABLET, EXTENDED RELEASE ORAL at 08:00

## 2021-05-31 RX ADMIN — ACETAMINOPHEN 975 MG: 325 TABLET, FILM COATED ORAL at 08:00

## 2021-05-31 RX ADMIN — PIPERACILLIN AND TAZOBACTAM 3.38 G: 3; .375 INJECTION, POWDER, LYOPHILIZED, FOR SOLUTION INTRAVENOUS at 04:35

## 2021-05-31 RX ADMIN — BUPROPION HYDROCHLORIDE 75 MG: 75 TABLET, FILM COATED ORAL at 14:39

## 2021-05-31 RX ADMIN — HEPARIN SODIUM 5000 UNITS: 5000 INJECTION, SOLUTION INTRAVENOUS; SUBCUTANEOUS at 08:10

## 2021-05-31 RX ADMIN — NYSTATIN 1000000 UNITS: 500000 SUSPENSION ORAL at 08:07

## 2021-05-31 RX ADMIN — ROSUVASTATIN CALCIUM 10 MG: 10 TABLET, FILM COATED ORAL at 08:07

## 2021-05-31 RX ADMIN — BUMETANIDE 3 MG: 2 TABLET ORAL at 08:00

## 2021-05-31 RX ADMIN — UMECLIDINIUM 1 PUFF: 62.5 AEROSOL, POWDER ORAL at 08:06

## 2021-05-31 RX ADMIN — VALGANCICLOVIR 450 MG: 450 TABLET, FILM COATED ORAL at 08:00

## 2021-05-31 RX ADMIN — BUPROPION HYDROCHLORIDE 75 MG: 75 TABLET, FILM COATED ORAL at 08:01

## 2021-05-31 RX ADMIN — ASPIRIN 81 MG CHEWABLE TABLET 81 MG: 81 TABLET CHEWABLE at 08:00

## 2021-05-31 RX ADMIN — SODIUM CHLORIDE, PRESERVATIVE FREE 5 ML: 5 INJECTION INTRAVENOUS at 06:20

## 2021-05-31 RX ADMIN — INSULIN ASPART 2 UNITS: 100 INJECTION, SOLUTION INTRAVENOUS; SUBCUTANEOUS at 12:22

## 2021-05-31 RX ADMIN — MYCOPHENOLATE MOFETIL 1500 MG: 250 CAPSULE ORAL at 08:00

## 2021-05-31 RX ADMIN — GABAPENTIN 600 MG: 300 CAPSULE ORAL at 14:39

## 2021-05-31 RX ADMIN — NYSTATIN 1000000 UNITS: 500000 SUSPENSION ORAL at 12:22

## 2021-05-31 RX ADMIN — ALLOPURINOL 300 MG: 300 TABLET ORAL at 08:00

## 2021-05-31 RX ADMIN — PIPERACILLIN AND TAZOBACTAM 3.38 G: 3; .375 INJECTION, POWDER, LYOPHILIZED, FOR SOLUTION INTRAVENOUS at 09:55

## 2021-05-31 RX ADMIN — GABAPENTIN 300 MG: 300 CAPSULE ORAL at 08:00

## 2021-05-31 RX ADMIN — PANTOPRAZOLE SODIUM 40 MG: 40 TABLET, DELAYED RELEASE ORAL at 08:01

## 2021-05-31 RX ADMIN — ALBUTEROL SULFATE 2.5 MG: 2.5 SOLUTION RESPIRATORY (INHALATION) at 12:33

## 2021-05-31 RX ADMIN — Medication 1 TABLET: at 08:00

## 2021-05-31 RX ADMIN — TACROLIMUS 4 MG: 1 CAPSULE ORAL at 08:01

## 2021-05-31 RX ADMIN — PREDNISONE 20 MG: 20 TABLET ORAL at 08:01

## 2021-05-31 RX ADMIN — FLUTICASONE FUROATE AND VILANTEROL TRIFENATATE 1 PUFF: 100; 25 POWDER RESPIRATORY (INHALATION) at 08:06

## 2021-05-31 RX ADMIN — ALBUTEROL SULFATE 2.5 MG: 2.5 SOLUTION RESPIRATORY (INHALATION) at 08:52

## 2021-05-31 ASSESSMENT — ACTIVITIES OF DAILY LIVING (ADL)
ADLS_ACUITY_SCORE: 12

## 2021-05-31 ASSESSMENT — MIFFLIN-ST. JEOR: SCORE: 1713.89

## 2021-05-31 NOTE — TELEPHONE ENCOUNTER
Pt called to check in. Pt states he feels a little weak, but glad to be home. How to get a hold of on call coordinator reviewed. Lab appt setup for Wednesday 6/2 at . Coordinator will call patient tomorrow for further post heart transplant teaching, pt verbalized understanding.   
the EKG is unchanged from prior EKG

## 2021-05-31 NOTE — PLAN OF CARE
D: Admitted s/p HF now s/p OHT 5/6/21  PMH of NICM (LVEF <30%) s/p HM2 LVAD 6/2017, VT, A-fib, DM2, CKD, and COPD     I: Monitored vitals and assessed pt status.      A: A0x4. Afebrile. VSS. HRs 100s. Sinus rhythm/tachycardia. Sats >92% on RA. Pt denies any shortness of breath, chest pain/palpitations, nausea, dizziness, or chills. Sternal incision CDI, WNL. BGs q4hrs.Pt on regular diet. Pt up SBA.      P: Discharge home this afternoon. Continue to monitor pt status and notify Cards 2 treatment team with any changes or concerns.

## 2021-05-31 NOTE — DISCHARGE SUMMARY
[facility], [place]   Cardiothoracic Surgery Hospital Discharge Summary     Jim Willingham MRN# [id number]   Age: 65 year old YOB: 1955     Attending Physician:  Dr. Ronnie Quigley   Discharge Physician:  Clara Valentin PA-C  Primary Care Physician:        Ricardo Gómez     DATE OF ADMISSION: 2/8/2021      DATE OF DISCHARGE: May 31, 2021     Admit Wt: 99.5 kg  Discharge Wt: 94.4 kg          Primary Diagnoses:   1.  Nonischemic Dilated cardiomyopathy EF 20%, status post  HeartMate II left ventricular assist device 6/19/2017, now s/p heart transplant this admission.   2.  Right ventricular failure.  3. Cardiorenal syndrome          Secondary Diagnoses:   1. ICD, s/p removal   2. Hx COPD and CECILIA on CPAP  3. Left hemidiaphragm elevation   4. Hx Sylvester-en-Y gastric bypass 2003  5. Non-severe Mild Protein-Calorie Malnutrition   6. Gout     PROCEDURES PERFORMED:   5/6/2021  PROCEDURE PERFORMED:  1.  Redo median sternotomy.  2.  Orthotopic heart transplantation.  3.  Explant of HeartMate II left ventricular assist device (intracorporeal left ventricular assist device).  4.  Removal of AICD and defibrillator.     SURGEON:  Ronnie Quigley MD.     CO-SURGEON:  Chase Albright MD.  Note, a secondary additional surgeon was required for this operation because of complexity of operation as the outflow graft was adjacent to the posterior wall of the sternum.      ASSISTANT:  Otilio Potter MD, Ronit Rogers MD, Alexis Odom MD      CONSULTS:    1. PT/OT  2. Cardiology  3. Health Psychology  4. Transplant ID6  5. Cardiothoracic Surgery   6. Pulmonary   7. Rheumatology  8. Neurology  9. Surgery  10. Orthopedics    BRIEF HISTORY OF ILLNESS:  Mr. Jim Willingham is a 65 year old male with history of NICM EF 20% c/b VT s/p CRT-D s/p HMII LVAD 6/19/2017 originally intended as destination therapy 2/2 obesity however with recent weight loss now candidate for transplantation. He was  admitted 2/8/21 for worsening functional status and renal function concerning for worsening heart failure. He was eventually listed status 2A for transplant and received a donor offer almost 3 months after initial admission on 5/6/21.      HOSPITAL COURSE:   Jim is now s/p orthotopic heart transplant and LVAD/ICD explant on 5/6/21 with Dr. Ronnie Quigley. His hospital course is detailed below.     S/P orthotopic heart transplant    No arrhythmias  HD stable.  Most recent echo showed LV EF 65-70% on 5/14/21. Biopsy 5/20 - 0R, no AMR, Biopsy 5/27 pending, Basiliximab received 5/10.  Terbutaline weaned to off.  Immunosuppression dosed per cardiology.   On ASA 81 mg, Crestor 10 mg daily.   Elevated filling pressures on RHC 5/27. Diuresed with IV Bumex. Now appears euvolemic and likely around dry weight at 94.5kg. On Bumex 2 mg PO BID per cardiology.  Temporary Pacer wires were removed not cut.     Air leak Right Lung with right chest tube in place (unintended injury during surgery)  Airleak was isolated to Right pleural. Had some CP after walking off suction 5/13 with associated subcutaneous emphysema, resolved 5/22 and tube removed 5/23 without complication. Stable left hemidiaphragm elevation.     COPD    Extubated POD 1 to 4 lpm via NC. Now saturating well on RA.   Encouraged Pulm toilet, IS, activity and deep breathing.  On home inhalers: Breo ellipta, Incruse ellipta, and Singulair       Hx Depression  Resumed on home bupropion and amitriptyline, no active issues.     Acute post-operative pain   Well controlled with acetaminophen, PO dilaudid PRN, IV dilaudid PRN. Not requiring narcotics at time of discharge.     WALTER on CKD stage III   Cardiorenal syndrome improving. Most recent creatinine 1.78, increased from 1.63.     Day before surgery weight 102.3 kg. Today 94.4 kg, appears euvolemic. Mild Cr elevation from baseline likely due to over diuresis. Bumex decreased to 2 mg po bid per cardiology for discharge.       Nausea with Emesis/post op ileus    Had BM 5/18 with lactulose, dosed again 5/24 per patient request. Suppository 5/25 AM with + BM but still feeling nauseated after BM. No clear obstructive signs on Abd Xray. Placed NG tube 5/25 with low intermittent suction. Had a gastrograffin challenge 5/25, contrast traveled to colon. Patient's symptoms improved 5/26 am. Minimal output from NG tube and having antegrade bowel function. NG tube removed 5/27. Continues to do well without n/v.   Patient now with loose stools and cutting back on bowel regimen. No further abdominal pain or N/V. Stool consistency becoming for solid.      Stress induced hyperglycemia  DMT2  Initially managed on insulin drip postop, transitioned to NPH BID and sliding scale. Endocrine consulted, signed off 5/9. Converted to Lantus 5/14 am, continued prandial and high resistance sliding corrective scale. Lantus held due to poor intake and hypoglycemia. Now hyperglycemic. Lantus restarted and titrated up to 20U daily on 5/29, continues to be on sliding scale.     Stress induced leukocytosis  HAP, persistent left lower base opacity   CT Ch/ Abd/ Pelvis 5/18 done for persistent elevated WBC; small consolidative opacity in LL base with ground glass opacities in both lung bases, questionable atypical infection with some degree of atelectasis. WBC had been decreasing since, saturating well, no cough, afebrile. CXR 5/22 confirming persistent elevated Left sandor-diaphragm. CXR 5/25 with evolving LLL opacity suggesting possible aspiration. Patient was started on levaquin on 5/22, which was switched to zosyn 5/25 given worsening leukocytosis. Sputum/UA sent 5/25, blood cultures 5/26. All were negative. CRP trending down and WBC now normalized. Patient finished course of Zosyn per transplant ID on 5/31/21    Acute blood loss anemia and thrombocytopenia  Hgb stable, Plts WNL, no signs or symptoms of active bleeding     Prior to discharge, patient's pain was  "controlled well, he was able to perform most ADLs and ambulate without difficulty, and had full return of bowel and bladder function.  On May 31, 2021, he was discharged to home in stable condition.    Patient discharged on aspirin:  Yes 81 mg  Patient discharged on beta blocker: no, heart transplant    Patient discharged on ACE Inhibitor/ARB: no, heart transplant              Discharge Disposition:     Discharged to home            Condition on Discharge:     Discharge condition: Stable   Discharge vitals: Blood pressure (!) 129/93, pulse 105, temperature 96  F (35.6  C), temperature source Axillary, resp. rate 18, height 1.727 m (5' 8\"), weight 94.4 kg (208 lb 3.2 oz), SpO2 100 %.     Code status on discharge: Full Code     Vitals:    05/29/21 0815 05/30/21 0818 05/31/21 0600   Weight: 97.8 kg (215 lb 9.6 oz) 95.5 kg (210 lb 9.6 oz) 94.4 kg (208 lb 3.2 oz)       DAY OF DISCHARGE PHYSICAL EXAM:    Blood pressure 103/81, pulse 101, temperature 97.7  F (36.5  C), temperature source Oral, resp. rate 18, height 1.727 m (5' 8\"), weight 94.4 kg (208 lb 3.2 oz), SpO2 96 %.  Vitals:    05/29/21 0815 05/30/21 0818 05/31/21 0600   Weight: 97.8 kg (215 lb 9.6 oz) 95.5 kg (210 lb 9.6 oz) 94.4 kg (208 lb 3.2 oz)      Gen: A&Ox4, NAD  Neuro: no focal deficits but looks tired  CV: RRR, normal S1 S2, no murmurs, rubs or gallops.   Pulm: CTA, normal breathing on RA  Abd: nondistended, normal BS, soft, nontender  Ext: trace LE edema   Incision: clean, dry, intact, no erythema, sternum stable  Tubes/drain sites: dressing clean and dry    BMP  Recent Labs   Lab 05/31/21  0624 05/30/21  0526 05/29/21  0606 05/28/21  2229    139 139 138   POTASSIUM 4.0 3.3* 3.2* 3.1*   CHLORIDE 104 102 103 102   QAMAR 8.2* 8.2* 8.1* 8.2*   CO2 28 28 28 28   BUN 31* 30 33* 34*   CR 1.78* 1.64* 1.69* 1.68*   * 174* 232* 197*     CBC  Recent Labs   Lab 05/31/21  0624 05/30/21  0526 05/29/21  0606 05/28/21  0531   WBC 7.0 7.0 6.8 10.2   RBC 3.10* " 3.22* 2.97* 2.97*   HGB 9.0* 9.3* 8.7* 8.8*   HCT 29.4* 30.1* 28.4* 28.2*   MCV 95 94 96 95   MCH 29.0 28.9 29.3 29.6   MCHC 30.6* 30.9* 30.6* 31.2*   RDW 16.0* 16.0* 16.2* 16.6*   * 116* 124* 125*     INR  No lab results found in last 7 days.   Hepatic Panel  Recent Labs   Lab 05/31/21  0624 05/26/21  1340   AST 35 26   ALT 82* 52   ALKPHOS 184* 123   BILITOTAL 0.8 0.6   ALBUMIN 2.7* 2.2*     Recent Labs   Lab 05/31/21  1127 05/31/21  0639 05/31/21  0624 05/30/21  2201 05/30/21  1510 05/30/21  1221 05/30/21  0737 05/30/21  0526 05/29/21  0606 05/29/21  0606 05/28/21  2229 05/28/21  2229 05/28/21  0531 05/28/21  0531 05/27/21  1840 05/27/21  1840   GLC  --   --  171*  --   --   --   --  174*  --  232*  --  197*  --  230*  --  294*   * 151*  --  295* 181* 79 143*  --    < >  --    < >  --    < >  --    < >  --     < > = values in this interval not displayed.       ECHOCARDIOGRAM, 5/14/2021-   Interpretation Summary  Post OHT 5/6/21  The patient's rhythm is sinus tachycardia.  Global and regional left ventricular function is hyperkinetic with an EF of  65-70%.  Global right ventricular function is normal.     This study was compared with the study from 5/9/2021 .  There has been no change.  ______________________________________________________________________________  Left Ventricle  Global and regional left ventricular function is hyperkinetic with an EF of  65-70%. Left ventricular size is normal. Left ventricular wall thickness  cannot evaluate. Diastolic function not assessed due to heart transplant.     Right Ventricle  The right ventricle is normal size. Global right ventricular function is  normal.     Atria  The atria cannot be assessed.     Mitral Valve  The valve leaflets are not well visualized. On Doppler interrogation, there is  no significant stenosis or regurgitation.     Aortic Valve  The valve leaflets are not well visualized. On Doppler interrogation, there is  no significant stenosis  or regurgitation.     Tricuspid Valve  The valve leaflets are not well visualized. On Doppler interrogation, there is  no significant stenosis or regurgitation.     Pulmonic Valve  The pulmonic valve cannot be assessed.     Vessels  The aorta root is normal. The thoracic aorta is normal. The inferior vena cava  cannot be assessed.     Pericardium  No pericardial effusion is present.     Compared to Previous Study  This study was compared with the study from 5/9/2021 . There has been no  change   ______________________________________________________________________________  Report approved by: Kailee LOERA 05/14/2021 01:23 PM                                    CT of the Chest, Abdomen and Pelvis without contrast, 5/26/2021 3:35  PM.     Comparison: Abdominal x-ray 5/26/2021. CT chest, abdomen, and pelvis  5/18/2021.     History: Abdominal pain, aortic dissection suspected.      Technique: Axial images of the chest, abdomen and pelvis were obtained  without contrast. Coronal reconstructions were provided. Images were  reviewed in bone, lung, and soft tissue windows.      Findings:  Support devices: Right upper extremity PICC tip terminates in the mid  SVC. Left internal jugular central catheter tip terminates in the high  SVC. Enteric tube tip and sidehole terminating in the gastric lumen.     Chest:   Postsurgical heart transplant changes. Median sternotomy wires are  intact. Thyroid gland is normal. Heart size is normal. No pericardial  effusion. Aortic arch atherosclerotic calcifications. Thoracic aorta  is normal in caliber. Conventional three-vessel branching pattern of  the aortic arch. Main pulmonary artery is normal in caliber.     No mediastinal or axillary lymphadenopathy by size criteria. Prominent  pretracheal lymph node measuring 7 mm on (series 3, image 124).  Esophagus is normal.     Trachea and central airways are clear. Multifocal left greater than  right. Peribronchovascular groundglass and  consolidative opacities,  increased since prior exam. A few of these opacities demonstrate  central hypoattenuation, increased from prior exam. For example in the  left upper lobe (series 4, image 88) and lingula (series 4, image  122). No pneumothorax. Small right greater than left pleural  effusions, increased from prior exam 5/18/2021. Bibasilar  consolidative opacities. No pneumothorax.     Abdomen and Pelvis:   Limited evaluation due to noncontrast technique. Liver is  unremarkable. Cholelithiasis. No pericholecystic fluid. Spleen size is  within normal limits. Pancreas is unremarkable.     No adrenal mass. No hydronephrosis, calcified stone, or contour  deforming mass. Bladder is distended and normal in appearance.  Prostate is within normal limits.     Contrast appreciated within the large bowel. No small or large bowel  dilation. The appendix is not well visualized, however there are no  significant inflammatory changes in the right lower quadrant. No free  intraperitoneal air. No portal venous gas. No significant  diverticulosis. Moderate colonic stool burden, decreased.     Abdominal aorta and major branches are normal in caliber with  scattered predominantly aortoiliac atherosclerotic calcifications.     No mesenteric, retroperitoneal, or pelvic lymphadenopathy by size  criteria.     Bones and Soft Tissues:   No suspicious osseous lesion. No acute osseous lesion. Degenerative  changes of the thoracolumbar spine. No suspicious mass. Anasarca.  Decreased subcutaneous gas along the bilateral left greater than right  chest wall and neck.                                                                      Impression:   1. Postsurgical heart transplant changes with interval increased  multifocal, peribronchial vascular groundglass and consolidative  opacities concerning for infection. Some of these opacities  demonstrate central hypoattenuation suggestive for organizing  pneumonia.  2. Increased small right  greater than left pleural effusions with  adjacent consolidation.  3. Cholelithiasis without additional evidence to suggest acute  cholecystitis.  4. Decreased subcutaneous gas along the bilateral chest walls and  neck.  5. Moderate colonic stool burden, decreased.     I have personally reviewed the examination and initial interpretation  and I agree with the findings.     MICHAEL FERNANDEZ MD    DISCHARGE INSTRUCTIONS:  AFTER YOU GO HOME FROM YOUR HEART SURGERY  Heart Transplant/LVAD explant 5/6/21 by Dr. Quigley    You had a sternotomy, avoid lifting anything greater than ten pounds for 6 weeks after surgery and then less than 20 pounds for an additional 6 weeks. Do not reach backwards or use arms to push out of chair. Do not let people pull on your arms to assist with standing. Avoid twisting or reaching too far across your body.  Avoid strenuous activities such as bowling, vacuuming, raking, shoveling, golf or tennis for 12 weeks after your surgery. It is okay to resume sex if you feel comfortable in doing so. You may have to try different positions with your partner.  Splint your chest incision by hugging a pillow or bringing your arms across your chest when coughing or sneezing. Sleep on your back for 6 weeks.     No driving for 4 weeks after surgery or while on pain medication.     Shower or wash your incisions daily with soap and water (or as instructed), pat dry. Keep wound clean and dry, showers are okay after discharge, but don't let spray hit directly on incision. No baths or swimming for 1 month. Cover chest tube sites with gauze until they stop draining, then leave open to air. It is not abnormal for chest tube sites to drain yellowish/clear fluid for up to 2-3 weeks after surgery.   Watch for signs of infection: increased redness, tenderness, warmth or any drainage that appears infected (pus like) or is persistent.  Also a temperature > 100.5 F or chills. Call your surgeon or primary care provider's office  immediately. Remove any skin glue left on incisions after 10-14 days. This will not affect your incision and can speed up healing.    Exercise is very important in your recovery. Please follow the guidelines set up for you in your cardiac rehab classes at the hospital. If outpatient cardiac rehab was ordered for you, we highly recommend you participate. If you have problems arranging your cardiac rehab, please call [phone] for all locations, with the exception of [place], please call [phone] and [facility] [place], please call [phone].    Avoid sitting for prolonged periods of time, try to walk every hour during the day. If you have a leg incision, elevate your leg often when you are not walking.    Check your weight when you get home from the hospital and continue to check it daily through your recovery for at least a month. If you notice a weight gain of 2-3 pounds in a week, notify your primary care physician, cardiologist or surgeon.    Bowel activity may be slow after surgery. If necessary, you may take an over the counter laxative such as Milk of Magnesia or Miralax. You may have stool softeners prescribed (docusate sodium, Senokot). We recommend using stool softeners while using narcotics for pain (oxycodone/percocet, hydrocodone/vicodin, hydromorphone/dilaudid).      Wean OFF of narcotics (oxycodone, dilaudid, hydrocodone) as soon as possible. You should continue taking acetaminophen as long as you have any surgical pain as the first choice for pain control and add narcotics as necessary for pain to be tolerable.      DENTAL VISITS AFTER SURGERY  If you have had your heart valve repaired or replaced, we do not recommend having any dental work done for 6 months and you will need to take an antibiotic prior to dental visits from now on.  Please notify your dentist before any procedure for the proper treatment needed. The antibiotic is taken by mouth one hour prior to visit. This includes routine  "cleanings.  You can sometimes hear a mechanical valve \"clicking,\" this is normal and not a sign of something wrong.    DO NOT SMOKE.  IF YOU NEED HELP QUITTING, PLEASE TALK WITH YOUR CARDIOLOGIST OR PRIMARY DOCTOR.      You had a heart transplant, so call your Transplant Coordinator with all questions or concerns.       REGARDING PRESCRIPTION REFILLS.  If you need a refill on your pain medication contact us to discuss your pain and a possible one time refill.   All other medications will be adjusted, discontinued and re-filled by your primary care physician and/or your cardiologist as they were prior to your surgery. We have given you enough for one to three month with possibly one refill.    POST-OPERATIVE CLINIC VISITS  You have a follow up visits with Cardiology and Surgery scheduled per your coordinator. Future medication refills should come from your Cardiologist. You have a lab appointment on Wednesday 6/2. A transplant coordinator will call you to help arrange appointment.     If there is a need to return to see CT Surgery please call our  at [phone].    SURGICAL QUESTIONS  Please call Gayla Reyes or Xiomara Diallo with surgical recovery and medication questions, their phone numbers are listed below.  They will assist you with your needs and contact other surgery care team members as indicated.    On weekends or after hours, please call [phone] and ask the  to   page the Cardiothoracic Surgery fellow on call.      Thank you,    Your Cardiothoracic Surgery Team  Gayla Reyes RN Care Coordinator-  [phone]   Xiomara Diallo RN Care Coordinator-  [phone]    DISCHARGE MEDICATIONS:    Jim Willingham   Home Medication Instructions VITO:[id number]    Printed on:05/31/21 1427   Medication Information                      ACE/ARB/ARNI NOT PRESCRIBED (INTENTIONAL)  ACE & ARB not prescribed due to Other: heart transplant             acetaminophen (TYLENOL) 325 MG tablet  Take 2 " tablets (650 mg) by mouth every 4 hours as needed for other (For optimal non-opioid multimodal pain management to improve pain control.)             albuterol (PROAIR HFA) 108 (90 Base) MCG/ACT inhaler  Inhale 2 puffs into the lungs every 4 hours as needed for shortness of breath / dyspnea or wheezing             allopurinol (ZYLOPRIM) 300 MG tablet  Take 1 tablet (300 mg) by mouth daily             amitriptyline (ELAVIL) 25 MG tablet  Take 1.5 tablets (37.5 mg) by mouth At Bedtime             aspirin (ASA) 81 MG chewable tablet  Take 1 tablet (81 mg) by mouth daily             blood glucose monitoring (ONE TOUCH ULTRA 2) meter device kit  Use to test blood sugar four times daily or as directed.             blood glucose VI test strip  Use to test blood sugar four times daily or as directed.             bumetanide (BUMEX) 1 MG tablet  Take 2 tablets (2 mg) by mouth 2 times daily             buPROPion (WELLBUTRIN) 100 MG tablet  0.5 tablets (50 mg) by Oral or Feeding Tube route every evening             buPROPion (WELLBUTRIN) 75 MG tablet  Take 1 tablet (75 mg) by mouth 2 times daily             calcium citrate-vitamin D (CITRACAL) 315-250 MG-UNIT TABS per tablet  Take 1 tablet by mouth 2 times daily             cyanocobalamin (VITAMIN B12) 1000 MCG/ML injection  Inject 1,000 mcg into the muscle every 30 days             Fluticasone-Umeclidin-Vilanterol (TRELEGY ELLIPTA) 100-62.5-25 MCG/INH oral inhaler  Inhale 1 puff into the lungs daily             gabapentin (NEURONTIN) 300 MG capsule  Take 1 capsule (300 mg) by mouth every morning             gabapentin (NEURONTIN) 300 MG capsule  Take 2 capsules (600 mg) by mouth 2 times daily At 12pm and 8pm             insulin aspart (NOVOLOG PEN) 100 UNIT/ML pen  Inject 1-10 Units Subcutaneous 3 times daily (before meals) Correction Scale - HIGH INSULIN RESISTANCE DOSING     Do Not give Correction Insulin if Pre-Meal BG less than 140.   For Pre-Meal  - 164 give 1 unit.    For Pre-Meal  - 189 give 2 units.   For Pre-Meal  - 214 give 3 units.   For Pre-Meal  - 239 give 4 units.   For Pre-Meal  - 264 give 5 units.   For Pre-Meal  - 289 give 6 units.   For Pre-Meal  - 314 give 7 units.   For Pre-Meal  - 339 give 8 units.   For Pre-Meal  - 364 give 9 units.   For Pre-Meal BG greater than or equal to 365 give 10 units  To be given with prandial insulin, and based on pre-meal blood glucose.   Notify provider if glucose greater than or equal to 350 mg/dL after administration of correction dose. If given at mealtime, administer within 30 minutes of start of meal             insulin aspart (NOVOLOG PEN) 100 UNIT/ML pen  Inject 1-7 Units Subcutaneous At Bedtime HIGH INSULIN RESISTANCE DOSING    Do Not give Bedtime Correction Insulin if BG less than 200.   For  - 224 give 1 units.   For  - 249 give 2 units.   For  - 274 give 3 units.   For  - 299 give 4 units.   For  - 324 give 5 units.   For  - 349 give 6 units.   For BG greater than or equal to 350 give 7 units.   Notify provider if glucose greater than or equal to 350 mg/dL after administration of correction dose. If given at mealtime, administer within 30 minutes of start of meal             insulin glargine (LANTUS PEN) 100 UNIT/ML pen  Inject 20 Units Subcutaneous every morning (before breakfast)             insulin pen needle (32G X 4 MM) 32G X 4 MM miscellaneous  Use four pen needles daily or as directed.             montelukast (SINGULAIR) 10 MG tablet  Take 1 tablet (10 mg) by mouth At Bedtime             mycophenolate (GENERIC EQUIVALENT) 250 MG capsule  Take 6 capsules (1,500 mg) by mouth 2 times daily             nystatin (MYCOSTATIN) 964355 UNIT/ML suspension  Swish and swallow 10 mLs (1,000,000 Units) in mouth 4 times daily             pantoprazole (PROTONIX) 40 MG EC tablet  Take 1 tablet (40 mg) by mouth 2 times daily             potassium  chloride ER (KLOR-CON M) 20 MEQ CR tablet  Take 1 tablet (20 mEq) by mouth 2 times daily             predniSONE (DELTASONE) 10 MG tablet  Take 2 tablets (20 mg) by mouth every morning             predniSONE (DELTASONE) 5 MG tablet  Take 3 tablets (15 mg) by mouth every evening             rosuvastatin (CRESTOR) 10 MG tablet  Take 1 tablet (10 mg) by mouth daily             sulfamethoxazole-trimethoprim (BACTRIM) 400-80 MG tablet  Take 1 tablet by mouth every 48 hours             tacrolimus (GENERIC EQUIVALENT) 1 MG capsule  Take 4 capsules (4 mg) by mouth 2 times daily             traMADol (ULTRAM) 50 MG tablet  Take 0.5-1 tablets (25-50 mg) by mouth every 6 hours as needed for moderate pain             valGANciclovir (VALCYTE) 450 MG tablet  Take 1 tablet (450 mg) by mouth daily                   CC:Ricardo MonroyC  [facility]   Cardiothoracic Surgery  Office phone: [phone]  Office fax: [phone]

## 2021-05-31 NOTE — PLAN OF CARE
DISCHARGE     Discharged to: Home  Via: Automobile  Accompanied by: Family-Wife Cate   Discharge Instructions: diet, activity, medications, follow up appointments, when to call the MD, and what to watchout for (i.e. s/s of infection, increasing SOB, palpitations, chest pain,)  Prescriptions: To be filled by [facility] pharmacy per pt's request; medication list reviewed & sent with pt.   Follow Up Appointments: arranged; information given  Belongings: All sent with pt  IV: out  Telemetry: off  Pt exhibits understanding of above discharge instructions; all questions answered.  Discharge Paperwork: Copy given to patient

## 2021-05-31 NOTE — PLAN OF CARE
Admitted 2/8 for SOB and worsening heart failure. S/p heart transplant on 5/6. Course complicated by pneumonia and ileus. History of NICM, EF <20%, VT s/p ICD, HM 2 2017, and CKD 3.     Neuro: A&Ox4. Slept well overnight.  Cardiac: Sinus rhythm/sinus tach. VSS.  Respiratory: Sating 90s on RA.  GI/: Voiding adequately. Loose BM x1.  Diet/appetite: On regular diet. Good appetite.  Activity: Up independently in the room/ standby assist with walker in ayers  Pain: Tramadol given x1 with relief.  Skin: no new deficits noted.  LDA's: PICC saline locked.     Plan: Anticipate discharge to home today. Will continue to monitor and notify team accordingly.

## 2021-06-01 ENCOUNTER — TELEPHONE (OUTPATIENT)
Dept: TRANSPLANT | Facility: CLINIC | Age: 66
End: 2021-06-01

## 2021-06-01 ENCOUNTER — ANTICOAGULATION THERAPY VISIT (OUTPATIENT)
Dept: ANTICOAGULATION | Facility: CLINIC | Age: 66
End: 2021-06-01

## 2021-06-01 DIAGNOSIS — Z94.1 TRANSPLANTED HEART (H): Primary | ICD-10-CM

## 2021-06-01 DIAGNOSIS — Z11.59 ENCOUNTER FOR SCREENING FOR OTHER VIRAL DISEASES: ICD-10-CM

## 2021-06-01 DIAGNOSIS — I50.22 CHRONIC SYSTOLIC CONGESTIVE HEART FAILURE (H): ICD-10-CM

## 2021-06-01 DIAGNOSIS — Z95.811 LVAD (LEFT VENTRICULAR ASSIST DEVICE) PRESENT (H): ICD-10-CM

## 2021-06-01 DIAGNOSIS — Z79.01 LONG TERM CURRENT USE OF ANTICOAGULANT THERAPY: ICD-10-CM

## 2021-06-01 DIAGNOSIS — Z94.1 S/P ORTHOTOPIC HEART TRANSPLANT (H): ICD-10-CM

## 2021-06-01 LAB
BACTERIA SPEC CULT: NO GROWTH
BACTERIA SPEC CULT: NO GROWTH
COPATH REPORT: NORMAL
HAPTOGLOB SERPL-MCNC: 110 MG/DL (ref 32–197)
SPECIMEN SOURCE: NORMAL
SPECIMEN SOURCE: NORMAL

## 2021-06-01 RX ORDER — PREDNISONE 5 MG/1
10 TABLET ORAL EVERY EVENING
Qty: 90 TABLET | Refills: 1 | Status: SHIPPED | OUTPATIENT
Start: 2021-06-01 | End: 2021-06-23

## 2021-06-01 NOTE — PROGRESS NOTES
Social Work Note Entered on 6/1/2021:     Transplant Social Work Service Discharge Note      Patient Name:  Jim Willingham     Anticipated Discharge Date:  5/31/2021    Discharge Disposition:   Home Care:  RNCC made referral to [facility]    Plan for 24 hour care for immediate post transplant period: Pt's wife is the 24hr caregiver support for the 30 days post-transplant. Caregiver expectations and role were discussed pre and post transplant.     If not local, plans for short term stay:  Pt is local.     Additional Services/Equipment Arranged:  None      Persons notified of above discharge plan:  Writer had discussed potential discharge with pt and wife, on 5/28.     Patient / Family response to discharge plan:  Pt and wife were understandably nervous about transition home, but expressed having no barriers to discharge.     Education and resources provided by SW at discharge: role of transplant  in out patient setting and provided contact info for , availability of heart transplant virtual support group    Discussed anticipated pharmacy out of pocket costs: YES    Provided [employer] Donor Letter Writing packet : YES    Plan:     Pt discharged home over a holiday weekend. Writer had met w/ pt and spoken to pt's wife, Cate, via phone, the Friday before discharge. Pt and wife are eager, but understandably nervous, about transition home. No concerns with discharge plan home as pt was doing much better with therapies, whom had changed rec to home. Pt overall coped very well with prolonged hospitalization as he waited for heart transplant and with post-transplant where he experienced some minor setbacks and longer then anticipated post-transplant hospital stay. There have been no mental health concerns throughout pt's hospitalization. Pt has great support network and was actively involved in our virtual transplant heart transplant support group. Encouraged pt to continue to participate  once he is settled at home.

## 2021-06-01 NOTE — PLAN OF CARE
Occupational Therapy Discharge Summary    Reason for therapy discharge:    Discharged to home with home therapy.    Progress towards therapy goal(s). See goals on Care Plan in [facility] electronic health record for goal details.  Goals partially met.  Barriers to achieving goals:   discharge from facility.    Therapy recommendation(s):    Continued therapy is recommended.  Rationale/Recommendations:   . Pt will benefit from HHOT as pt is pt requires assistive device, assistance from an additional person, and would require taxing amount of energy to leave home environment, therefore appropriate for HH therapy.

## 2021-06-01 NOTE — TELEPHONE ENCOUNTER
Post heart transplant teaching completed phone. Wife and patient both present. IMS meds reviewed with 12 hour trough explained. Heart biopsies, rejection, denervation, when to call coordinator, CAV, driving restrictions, cardiac rehab, diet, vaccines, infection prevention, and routine checkups discussed. Schedule of follow up appointments provided. Medication list reviewed. All questions answered and patient encouraged to call with any further questions.

## 2021-06-01 NOTE — TELEPHONE ENCOUNTER
Jim is a post heart transplant who discharged on Monday 05/31/2021. Patient chooses to receive medications from  specialty pharmacy. He also stated that he wanted to continue using his local [facility] for his prior to admission medications, because he felt a loyalty to them.    HEALTH BENEFIT: [employer]  ID#[id number] GRP#[id number] (EFFECTIVE 1/1/21)   SECONDARY HEALTH BENEFIT: MEDICARE PART A ONLY  ID#[id number] (PART A EFFECTIVE 7/1/10, PART B INACTIVE )    PHARMACY BENEFIT: [employer]  PROCESSING INFO: ID#[id number] [facility]#[id number] PCN#[id number] BIN#[id number] (EFFECTIVE 1/1/21)   DEDUCTIBLE $250 & OUT-OF-POCKET $500  COPAY STRUCTURE:  $10 FOR GENERIC  %40 COINSURANCE WITH A MINIMUM $45 COPAY FOR NON-FORMULARY GENERIC  $10 FOR BRAND  %40 COINSURANCE WITH A MINIMUM $45 COPAY FOR NON-FORMULARY BRAND    TESTCLAIM SPECIALTY #28:   MYCOPHENOLATE 250MG=$8.80,   PROGRAF 1MG=$8.80,   TACROLIMUS 1MG=$8.80,   CYCLOSPORINE 100MG=$8.80,   VALGANCICLOVIR 450MG=$8.80     TESTCLAIM DISCHARGE #27:   MYCOPHENOLATE 250MG=$8.80,   PROGRAF 1MG=$0,   TACROLIMUS 1MG=$8.80,   CYCLOSPORINE 100MG=$8.80,   VALGANCICLOVIR 450MG=$8.80      Neeraj Rush, Pharm.D.  [facility]  [phone]

## 2021-06-01 NOTE — PROGRESS NOTES
[facility]: Post-Discharge Note  SITUATION                                                      Admission:    Admission Date: 02/08/21   Reason for Admission: Nonischemic Dilated cardiomyopathy EF 20%, status post  HeartMate II left ventricular assist device 6/19/2017, now s/p heart transplant this admission. 2.  Right ventricular failure.  Discharge:   Discharge Date: 05/31/21  Discharge Diagnosis: Nonischemic Dilated cardiomyopathy EF 20%, status post  HeartMate II left ventricular assist device 6/19/2017, now s/p heart transplant this admission. 2.  Right ventricular failure.    BACKGROUND                                                      BRIEF HISTORY OF ILLNESS:  Mr. Jim Willingham is a 65 year old male with history of NICM EF 20% c/b VT s/p CRT-D s/p HMII LVAD 6/19/2017 originally intended as destination therapy 2/2 obesity however with recent weight loss now candidate for transplantation. He was admitted 2/8/21 for worsening functional status and renal function concerning for worsening heart failure. He was eventually listed status 2A for transplant and received a donor offer almost 3 months after initial admission on 5/6/21.     ASSESSMENT      Discharge Assessment  Patient reports symptoms are: Improved  Does the patient have all of their medications?: Yes  Does patient know what their new medications are for?: Yes  Does patient have a follow-up appointment scheduled?: Yes  Does patient have any other questions or concerns?: No    Post-op  Did the patient have surgery or a procedure: Yes  Incision: healing  Drainage: No  Bleeding: none  Fever: No  Chills: No  Redness: No  Warmth: No  Swelling: No  Incision site pain: No  Eating & Drinking: eating and drinking without complaints/concerns  PO Intake: regular diet  Bowel Function: loose stools  Urinary Status: voiding without complaint/concerns        PLAN                                                      Outpatient Plan:  You have a follow up  visits with Cardiology and Surgery scheduled per your coordinator. Future medication refills should come from your Cardiologist. You have a lab appointment on Wednesday 6/2. A transplant coordinator will call you to help arrange appointment.      If there is a need to return to see CT Surgery please call our  at [phone].       Future Appointments   Date Time Provider Department Center   6/1/2021  7:00 PM Liana Parker, PT [facility] O   6/2/2021  8:50 AM LAB FIRST FLOOR [facility] MGLAB [place]   6/7/2021  7:00 AM UUXR3 UUXRAY [place] O   6/7/2021  7:30 AM UU LAB GOLD WAITING UOPLB [place] O   6/7/2021  8:00 AM U2A ROOM 15 [facility] O   6/7/2021 12:00 PM Akshat Parkinson, [facility]   6/7/2021  2:30 PM UUECHR2 UUCVSV [place] O   6/15/2021  8:30 AM Shelia Means, BARRIE [facility]   6/15/2021  9:30 AM U2A ROOM 9 [facility] O   6/15/2021 10:30 AM UC LAB UCLAB [facility]   6/15/2021 11:00 AM UC CV DEVICE 1 UCCVCV [facility]   6/15/2021 11:30 AM Darcie Gonzales MD [facility]   6/28/2021 10:30 AM UU LAB GOLD WAITING UOPLB [place] O   6/28/2021 11:00 AM [facility]A [facility]   7/12/2021 10:30 AM UU LAB GOLD WAITING UOPLB [place] O   7/12/2021 11:00 AM U2A ROOM 5 [facility] O   7/29/2021  9:00 AM UU LAB GOLD WAITING UOPLB [place] O   7/29/2021  9:30 AM UUECHR2 UUCVSV [place] O   7/29/2021 11:00 AM U2A ROOM 16 [facility] O   9/16/2021 12:00 AM UC ICD REMOTE CVSV [facility]           Cristal Jacobs, [facility]

## 2021-06-01 NOTE — PLAN OF CARE
Physical Therapy Discharge Summary    Reason for therapy discharge:    Discharged to home with home therapy.    Progress towards therapy goal(s). See goals on Care Plan in [facility] electronic health record for goal details.  Goals met    Therapy recommendation(s):    Continued therapy is recommended.  Rationale/Recommendations:   PT to progress functional act tolerance, I and safety in home.

## 2021-06-01 NOTE — PROGRESS NOTES
ANTICOAGULATION  MANAGEMENT    Jim Willingham is being discharged from the [facility] ([facility]).    Reason for discharge: warfarin therapy completed.  Patient got a heart transplant.    Anticoagulation episode resolved    If patient needs warfarin management in the future, please send a new referral

## 2021-06-02 ENCOUNTER — TELEPHONE (OUTPATIENT)
Dept: CARDIOLOGY | Facility: CLINIC | Age: 66
End: 2021-06-02

## 2021-06-02 ENCOUNTER — TELEPHONE (OUTPATIENT)
Dept: TRANSPLANT | Facility: CLINIC | Age: 66
End: 2021-06-02

## 2021-06-02 DIAGNOSIS — Z11.59 ENCOUNTER FOR SCREENING FOR OTHER VIRAL DISEASES: ICD-10-CM

## 2021-06-02 DIAGNOSIS — Z94.1 TRANSPLANTED HEART (H): ICD-10-CM

## 2021-06-02 LAB
ALBUMIN SERPL-MCNC: 3.1 G/DL (ref 3.4–5)
ALP SERPL-CCNC: 201 U/L (ref 40–150)
ALT SERPL W P-5'-P-CCNC: 71 U/L (ref 0–70)
ANION GAP SERPL CALCULATED.3IONS-SCNC: 3 MMOL/L (ref 3–14)
AST SERPL W P-5'-P-CCNC: 15 U/L (ref 0–45)
BASOPHILS # BLD AUTO: 0 10E9/L (ref 0–0.2)
BASOPHILS NFR BLD AUTO: 0.2 %
BILIRUB SERPL-MCNC: 0.5 MG/DL (ref 0.2–1.3)
BUN SERPL-MCNC: 36 MG/DL (ref 7–30)
CALCIUM SERPL-MCNC: 8.3 MG/DL (ref 8.5–10.1)
CHLORIDE SERPL-SCNC: 109 MMOL/L (ref 94–109)
CO2 SERPL-SCNC: 30 MMOL/L (ref 20–32)
CREAT SERPL-MCNC: 1.82 MG/DL (ref 0.66–1.25)
DIFFERENTIAL METHOD BLD: ABNORMAL
EOSINOPHIL # BLD AUTO: 0 10E9/L (ref 0–0.7)
EOSINOPHIL NFR BLD AUTO: 0.2 %
ERYTHROCYTE [DISTWIDTH] IN BLOOD BY AUTOMATED COUNT: 16.8 % (ref 10–15)
GFR SERPL CREATININE-BSD FRML MDRD: 38 ML/MIN/{1.73_M2}
GLUCOSE SERPL-MCNC: 143 MG/DL (ref 70–99)
HCT VFR BLD AUTO: 32.7 % (ref 40–53)
HGB BLD-MCNC: 10.1 G/DL (ref 13.3–17.7)
IMM GRANULOCYTES # BLD: 0 10E9/L (ref 0–0.4)
IMM GRANULOCYTES NFR BLD: 0.7 %
LYMPHOCYTES # BLD AUTO: 0.9 10E9/L (ref 0.8–5.3)
LYMPHOCYTES NFR BLD AUTO: 15.4 %
MCH RBC QN AUTO: 29.5 PG (ref 26.5–33)
MCHC RBC AUTO-ENTMCNC: 30.9 G/DL (ref 31.5–36.5)
MCV RBC AUTO: 96 FL (ref 78–100)
MONOCYTES # BLD AUTO: 0.4 10E9/L (ref 0–1.3)
MONOCYTES NFR BLD AUTO: 7.4 %
NEUTROPHILS # BLD AUTO: 4.3 10E9/L (ref 1.6–8.3)
NEUTROPHILS NFR BLD AUTO: 76.1 %
PLATELET # BLD AUTO: 194 10E9/L (ref 150–450)
POTASSIUM SERPL-SCNC: 4.2 MMOL/L (ref 3.4–5.3)
PROT SERPL-MCNC: 5.9 G/DL (ref 6.8–8.8)
RBC # BLD AUTO: 3.42 10E12/L (ref 4.4–5.9)
SODIUM SERPL-SCNC: 142 MMOL/L (ref 133–144)
TACROLIMUS BLD-MCNC: 6.4 UG/L (ref 5–15)
TME LAST DOSE: NORMAL H
WBC # BLD AUTO: 5.6 10E9/L (ref 4–11)

## 2021-06-02 PROCEDURE — 80053 COMPREHEN METABOLIC PANEL: CPT | Performed by: INTERNAL MEDICINE

## 2021-06-02 PROCEDURE — 85025 COMPLETE CBC W/AUTO DIFF WBC: CPT | Performed by: INTERNAL MEDICINE

## 2021-06-02 PROCEDURE — 80197 ASSAY OF TACROLIMUS: CPT | Performed by: INTERNAL MEDICINE

## 2021-06-02 PROCEDURE — 36415 COLL VENOUS BLD VENIPUNCTURE: CPT | Performed by: INTERNAL MEDICINE

## 2021-06-02 NOTE — TELEPHONE ENCOUNTER
Health Call Center    Phone Message    May a detailed message be left on voicemail: no     Reason for Call: Other: I spoke with Jim, he called because he is post-hospital from a heart transplant. He was discharged 6/1/2021 and is now having symptoms. He is experiencing nausea and coughing. Please reach out to [facility] as soon as able at [phone].     Action Taken: Message routed to:  [facility] ([facility]): Cardiology    Travel Screening: Not Applicable

## 2021-06-02 NOTE — TELEPHONE ENCOUNTER
Pt recently discharge from hospital  Having problems today with nausea  And having coughing spells

## 2021-06-02 NOTE — TELEPHONE ENCOUNTER
Pt called back. He states he is having some heartburn. It was worse earlier and starting to improve. Pt states its hard to swallow pills when he has it. Pt takes protonix bid. Writer encouraged food with pills and to try drinking a glass of milk. Pt had labs done earlier today. Awaiting drug level. 12 hour trough accurate. Pt encouraged to call back if heartburn doesn't improve. Pt verbalized understanding.

## 2021-06-03 ENCOUNTER — TELEPHONE (OUTPATIENT)
Dept: TRANSPLANT | Facility: CLINIC | Age: 66
End: 2021-06-03

## 2021-06-03 DIAGNOSIS — Z94.1 S/P ORTHOTOPIC HEART TRANSPLANT (H): ICD-10-CM

## 2021-06-03 RX ORDER — AMOXICILLIN 250 MG
2 CAPSULE ORAL DAILY PRN
Qty: 180 TABLET | Refills: 3 | Status: SHIPPED | OUTPATIENT
Start: 2021-06-03 | End: 2021-08-24

## 2021-06-03 RX ORDER — TACROLIMUS 1 MG/1
CAPSULE ORAL
Qty: 810 CAPSULE | Refills: 1 | Status: ON HOLD | OUTPATIENT
Start: 2021-06-03 | End: 2021-07-05

## 2021-06-03 NOTE — TELEPHONE ENCOUNTER
Tac level 6.4. Goal 10-12. Cr 1.8. Current dose 4 mg bid. Increase to 5 mg in am and 4 mg in pm. Repeat at upcoming appointments.      Pt states heartburn has improved with tums.     Upcoming appointments reviewed and refills sent.

## 2021-06-04 ENCOUNTER — TELEPHONE (OUTPATIENT)
Dept: CARDIOLOGY | Facility: CLINIC | Age: 66
End: 2021-06-04

## 2021-06-04 DIAGNOSIS — Z94.1 TRANSPLANTED HEART (H): Primary | ICD-10-CM

## 2021-06-04 RX ORDER — LIDOCAINE 40 MG/G
CREAM TOPICAL
Status: CANCELLED | OUTPATIENT
Start: 2021-06-04

## 2021-06-04 RX ORDER — SODIUM CHLORIDE 9 MG/ML
INJECTION, SOLUTION INTRAVENOUS CONTINUOUS
Status: CANCELLED | OUTPATIENT
Start: 2021-06-04

## 2021-06-04 NOTE — TELEPHONE ENCOUNTER
Call complete for pre procedure reminder, travel screen and updated visitor policy.  Pre-procedure COVID test not required at this time since patient had a heart transplant on 5/6/2021. Screen is negative for symptoms, exposure, and travel.

## 2021-06-07 ENCOUNTER — HOSPITAL ENCOUNTER (OUTPATIENT)
Facility: CLINIC | Age: 66
Discharge: HOME | End: 2021-06-07
Attending: INTERNAL MEDICINE | Admitting: INTERNAL MEDICINE
Payer: COMMERCIAL

## 2021-06-07 ENCOUNTER — HOSPITAL ENCOUNTER (OUTPATIENT)
Dept: GENERAL RADIOLOGY | Facility: CLINIC | Age: 66
End: 2021-06-07
Attending: INTERNAL MEDICINE | Admitting: INTERNAL MEDICINE
Payer: COMMERCIAL

## 2021-06-07 ENCOUNTER — APPOINTMENT (OUTPATIENT)
Dept: LAB | Facility: CLINIC | Age: 66
End: 2021-06-07
Attending: INTERNAL MEDICINE
Payer: COMMERCIAL

## 2021-06-07 ENCOUNTER — RESULTS ONLY (OUTPATIENT)
Dept: OTHER | Facility: CLINIC | Age: 66
End: 2021-06-07

## 2021-06-07 ENCOUNTER — APPOINTMENT (OUTPATIENT)
Dept: MEDSURG UNIT | Facility: CLINIC | Age: 66
End: 2021-06-07
Attending: INTERNAL MEDICINE
Payer: COMMERCIAL

## 2021-06-07 ENCOUNTER — HOSPITAL ENCOUNTER (OUTPATIENT)
Dept: CARDIOLOGY | Facility: CLINIC | Age: 66
End: 2021-06-07
Attending: INTERNAL MEDICINE | Admitting: INTERNAL MEDICINE
Payer: COMMERCIAL

## 2021-06-07 ENCOUNTER — TELEPHONE (OUTPATIENT)
Dept: TRANSPLANT | Facility: CLINIC | Age: 66
End: 2021-06-07

## 2021-06-07 VITALS
RESPIRATION RATE: 18 BRPM | TEMPERATURE: 97.9 F | HEART RATE: 106 BPM | HEIGHT: 68 IN | WEIGHT: 195.8 LBS | OXYGEN SATURATION: 97 % | SYSTOLIC BLOOD PRESSURE: 119 MMHG | DIASTOLIC BLOOD PRESSURE: 86 MMHG | BODY MASS INDEX: 29.67 KG/M2

## 2021-06-07 DIAGNOSIS — Z94.1 TRANSPLANTED HEART (H): ICD-10-CM

## 2021-06-07 LAB
ALBUMIN SERPL-MCNC: 3.2 G/DL (ref 3.4–5)
ALP SERPL-CCNC: 214 U/L (ref 40–150)
ALT SERPL W P-5'-P-CCNC: 54 U/L (ref 0–70)
ANION GAP SERPL CALCULATED.3IONS-SCNC: 5 MMOL/L (ref 3–14)
AST SERPL W P-5'-P-CCNC: 10 U/L (ref 0–45)
BASOPHILS # BLD AUTO: 0 10E9/L (ref 0–0.2)
BASOPHILS NFR BLD AUTO: 0.1 %
BILIRUB SERPL-MCNC: 0.4 MG/DL (ref 0.2–1.3)
BUN SERPL-MCNC: 41 MG/DL (ref 7–30)
CALCIUM SERPL-MCNC: 8.2 MG/DL (ref 8.5–10.1)
CHLORIDE SERPL-SCNC: 105 MMOL/L (ref 94–109)
CK SERPL-CCNC: 49 U/L (ref 30–300)
CMV DNA SPEC NAA+PROBE-ACNC: NORMAL [IU]/ML
CMV DNA SPEC NAA+PROBE-LOG#: NORMAL {LOG_IU}/ML
CO2 SERPL-SCNC: 30 MMOL/L (ref 20–32)
CREAT SERPL-MCNC: 1.93 MG/DL (ref 0.66–1.25)
DIFFERENTIAL METHOD BLD: ABNORMAL
EOSINOPHIL # BLD AUTO: 0 10E9/L (ref 0–0.7)
EOSINOPHIL NFR BLD AUTO: 0.1 %
ERYTHROCYTE [DISTWIDTH] IN BLOOD BY AUTOMATED COUNT: 17.1 % (ref 10–15)
GFR SERPL CREATININE-BSD FRML MDRD: 36 ML/MIN/{1.73_M2}
GLUCOSE SERPL-MCNC: 128 MG/DL (ref 70–99)
HBV CORE AB SERPL QL IA: NONREACTIVE
HBV SURFACE AG SERPL QL IA: NONREACTIVE
HCT VFR BLD AUTO: 34 % (ref 40–53)
HCV AB SERPL QL IA: NONREACTIVE
HGB BLD-MCNC: 10.2 G/DL (ref 13.3–17.7)
HGB BLD-MCNC: 10.4 G/DL (ref 13.3–17.7)
HGB BLD-MCNC: 9.9 G/DL (ref 13.3–17.7)
HIV 1+2 AB+HIV1 P24 AG SERPL QL IA: NONREACTIVE
IMM GRANULOCYTES # BLD: 0.1 10E9/L (ref 0–0.4)
IMM GRANULOCYTES NFR BLD: 1.2 %
LYMPHOCYTES # BLD AUTO: 1 10E9/L (ref 0.8–5.3)
LYMPHOCYTES NFR BLD AUTO: 12.4 %
MAGNESIUM SERPL-MCNC: 1.7 MG/DL (ref 1.6–2.3)
MCH RBC QN AUTO: 29.9 PG (ref 26.5–33)
MCHC RBC AUTO-ENTMCNC: 30.6 G/DL (ref 31.5–36.5)
MCV RBC AUTO: 98 FL (ref 78–100)
MONOCYTES # BLD AUTO: 0.8 10E9/L (ref 0–1.3)
MONOCYTES NFR BLD AUTO: 10.8 %
NEUTROPHILS # BLD AUTO: 5.8 10E9/L (ref 1.6–8.3)
NEUTROPHILS NFR BLD AUTO: 75.4 %
NRBC # BLD AUTO: 0 10*3/UL
NRBC BLD AUTO-RTO: 0 /100
OXYHGB MFR BLD: 65 % (ref 92–100)
OXYHGB MFR BLD: 86 % (ref 92–100)
PHOSPHATE SERPL-MCNC: 3.7 MG/DL (ref 2.5–4.5)
PLATELET # BLD AUTO: 352 10E9/L (ref 150–450)
POTASSIUM SERPL-SCNC: 4.2 MMOL/L (ref 3.4–5.3)
PROT SERPL-MCNC: 6 G/DL (ref 6.8–8.8)
RBC # BLD AUTO: 3.48 10E12/L (ref 4.4–5.9)
SODIUM SERPL-SCNC: 140 MMOL/L (ref 133–144)
SPECIMEN SOURCE: NORMAL
TACROLIMUS BLD-MCNC: 11.7 UG/L (ref 5–15)
TME LAST DOSE: NORMAL H
WBC # BLD AUTO: 7.7 10E9/L (ref 4–11)

## 2021-06-07 PROCEDURE — 85025 COMPLETE CBC W/AUTO DIFF WBC: CPT | Performed by: INTERNAL MEDICINE

## 2021-06-07 PROCEDURE — 93010 ELECTROCARDIOGRAM REPORT: CPT | Mod: 59 | Performed by: INTERNAL MEDICINE

## 2021-06-07 PROCEDURE — 36415 COLL VENOUS BLD VENIPUNCTURE: CPT | Performed by: INTERNAL MEDICINE

## 2021-06-07 PROCEDURE — 86704 HEP B CORE ANTIBODY TOTAL: CPT | Performed by: INTERNAL MEDICINE

## 2021-06-07 PROCEDURE — 83735 ASSAY OF MAGNESIUM: CPT | Performed by: INTERNAL MEDICINE

## 2021-06-07 PROCEDURE — 87535 HIV-1 PROBE&REVERSE TRNSCRPJ: CPT | Performed by: INTERNAL MEDICINE

## 2021-06-07 PROCEDURE — 82810 BLOOD GASES O2 SAT ONLY: CPT

## 2021-06-07 PROCEDURE — 84100 ASSAY OF PHOSPHORUS: CPT | Performed by: INTERNAL MEDICINE

## 2021-06-07 PROCEDURE — 71046 X-RAY EXAM CHEST 2 VIEWS: CPT

## 2021-06-07 PROCEDURE — 80053 COMPREHEN METABOLIC PANEL: CPT | Performed by: INTERNAL MEDICINE

## 2021-06-07 PROCEDURE — 999N000054 HC STATISTIC EKG NON-CHARGEABLE

## 2021-06-07 PROCEDURE — 272N000001 HC OR GENERAL SUPPLY STERILE: Performed by: INTERNAL MEDICINE

## 2021-06-07 PROCEDURE — 88307 TISSUE EXAM BY PATHOLOGIST: CPT | Mod: 26 | Performed by: PATHOLOGY

## 2021-06-07 PROCEDURE — 93306 TTE W/DOPPLER COMPLETE: CPT

## 2021-06-07 PROCEDURE — 93306 TTE W/DOPPLER COMPLETE: CPT | Mod: 26 | Performed by: INTERNAL MEDICINE

## 2021-06-07 PROCEDURE — 88350 IMFLUOR EA ADDL 1ANTB STN PX: CPT | Mod: 26 | Performed by: PATHOLOGY

## 2021-06-07 PROCEDURE — 86803 HEPATITIS C AB TEST: CPT | Performed by: INTERNAL MEDICINE

## 2021-06-07 PROCEDURE — 71046 X-RAY EXAM CHEST 2 VIEWS: CPT | Mod: 26 | Performed by: RADIOLOGY

## 2021-06-07 PROCEDURE — 88346 IMFLUOR 1ST 1ANTB STAIN PX: CPT | Mod: 26 | Performed by: PATHOLOGY

## 2021-06-07 PROCEDURE — 82550 ASSAY OF CK (CPK): CPT | Performed by: INTERNAL MEDICINE

## 2021-06-07 PROCEDURE — 99213 OFFICE O/P EST LOW 20 MIN: CPT | Mod: 25 | Performed by: NURSE PRACTITIONER

## 2021-06-07 PROCEDURE — 86833 HLA CLASS II HIGH DEFIN QUAL: CPT | Performed by: INTERNAL MEDICINE

## 2021-06-07 PROCEDURE — C1894 INTRO/SHEATH, NON-LASER: HCPCS | Performed by: INTERNAL MEDICINE

## 2021-06-07 PROCEDURE — 85018 HEMOGLOBIN: CPT

## 2021-06-07 PROCEDURE — 88307 TISSUE EXAM BY PATHOLOGIST: CPT | Mod: TC | Performed by: INTERNAL MEDICINE

## 2021-06-07 PROCEDURE — 80197 ASSAY OF TACROLIMUS: CPT | Performed by: INTERNAL MEDICINE

## 2021-06-07 PROCEDURE — 87340 HEPATITIS B SURFACE AG IA: CPT | Performed by: INTERNAL MEDICINE

## 2021-06-07 PROCEDURE — 87516 HEPATITIS B DNA AMP PROBE: CPT | Performed by: INTERNAL MEDICINE

## 2021-06-07 PROCEDURE — 87799 DETECT AGENT NOS DNA QUANT: CPT | Performed by: INTERNAL MEDICINE

## 2021-06-07 PROCEDURE — 88350 IMFLUOR EA ADDL 1ANTB STN PX: CPT | Mod: TC | Performed by: INTERNAL MEDICINE

## 2021-06-07 PROCEDURE — 999N000132 HC STATISTIC PP CARE STAGE 1

## 2021-06-07 PROCEDURE — 250N000009 HC RX 250: Performed by: INTERNAL MEDICINE

## 2021-06-07 PROCEDURE — 88346 IMFLUOR 1ST 1ANTB STAIN PX: CPT | Mod: TC | Performed by: INTERNAL MEDICINE

## 2021-06-07 PROCEDURE — 87389 HIV-1 AG W/HIV-1&-2 AB AG IA: CPT | Performed by: INTERNAL MEDICINE

## 2021-06-07 PROCEDURE — 86832 HLA CLASS I HIGH DEFIN QUAL: CPT | Performed by: INTERNAL MEDICINE

## 2021-06-07 PROCEDURE — 87521 HEPATITIS C PROBE&RVRS TRNSC: CPT | Performed by: INTERNAL MEDICINE

## 2021-06-07 PROCEDURE — 93505 ENDOMYOCARDIAL BIOPSY: CPT | Performed by: INTERNAL MEDICINE

## 2021-06-07 RX ORDER — LIDOCAINE 40 MG/G
CREAM TOPICAL
Status: DISCONTINUED | OUTPATIENT
Start: 2021-06-07 | End: 2021-06-07 | Stop reason: HOSPADM

## 2021-06-07 RX ORDER — SODIUM CHLORIDE 9 MG/ML
INJECTION, SOLUTION INTRAVENOUS CONTINUOUS
Status: DISCONTINUED | OUTPATIENT
Start: 2021-06-07 | End: 2021-06-07 | Stop reason: HOSPADM

## 2021-06-07 RX ADMIN — LIDOCAINE: 40 CREAM TOPICAL at 08:45

## 2021-06-07 ASSESSMENT — MIFFLIN-ST. JEOR: SCORE: 1652.51

## 2021-06-07 NOTE — DISCHARGE INSTRUCTIONS
[facility]                        Interventional Cardiology  Discharge Instructions   Post Right Heart Cath      AFTER YOU GO HOME:    DO drink plenty of fluids    DO resume your regular diet and medications unless otherwise instructed by your Primary Physician    Do Not scrub the procedure site vigorously    No lotion or powder to the puncture site for 3 days    CALL YOUR PRIMARY PHYSICIAN IF: You may resume all normal activity.  Monitor neck site for bleeding, swelling, or voice changes. If you notice bleeding or swelling immediately apply pressure to the site and call number below to speak with Cardiology Fellow.  If you experience any changes in your breathing you should call your doctor immediately or come to the closest Emergency Department.  Do not drive yourself.    ADDITIONAL INSTRUCTIONS: Medications: You are to resume all home medications including anticoagulation therapy unless otherwise advised by your primary cardiologist or nurse coordinator.    Follow Up: Per your primary cardiology team    If you have any questions or concerns regarding your procedure site please call [phone] at anytime and ask for Cardiology Fellow on call.  They are available 24 hours a day.  You may also contact the Cardiology Clinic after hours number at [phone].                                                       Telephone Numbers [phone] Monday-Friday 8:00 am to 4:30 pm    [phone] After 4:30 pm Monday-Friday, Weekends & Holidays  Ask for Interventional Cardiologist on call. Someone is on call 24 hours/day   [facility] toll free number [phone] Monday-Friday 8:00 am to 4:30 pm   [facility] Emergency Dept [phone]

## 2021-06-07 NOTE — PRE-PROCEDURE
GENERAL PRE-PROCEDURE:   Procedure:  Right heart catheterization and endomyocardial biopsy  Date/Time:  6/7/2021 9:08 AM    Written consent obtained?: Yes    Risks and benefits: Risks, benefits and alternatives were discussed    DC Plan: Appropriate discharge home plan in place for patients who are going home after procedure   Consent given by:  Patient  Patient states understanding of procedure being performed: Yes    Patient's understanding of procedure matches consent: Yes    Procedure consent matches procedure scheduled: Yes    Appropriately NPO:  Yes  Lungs:  Lungs clear with good breath sounds bilaterally  Heart:  Normal heart sounds and rate

## 2021-06-07 NOTE — PROGRESS NOTES
ADULT HEART TRANSPLANT [facility]INIC    HPI:   Jim Willingham is a 66 year old male with history of NICM EF 20% c/b VT s/p CRT-D s/p HMII LVAD 6/19/2017 with subsequent OHT 5/6/21. His postoperative course was complicated by right pleural air leak with prolonged chest tube and ileus requiring NG tube placement. He presents to 2A for routine testing today.     He is feeling well. He complains of bilateral UE tremors. He denies fever, chills, oral lesions, lightheadedness, dizziness, chest pain, palpitations, SOB, RESENDIZ, PND, orthopnea, nausea, vomiting, diarrhea, or LE edema. He is able to ambulate around his home and very eager to be back with his grand daughter.     TRANSPLANT MEDICATIONS:  Immunosuppression: Cellcept 1500 mg po BID. Prednisone 20 mg in AM and 5 mg in the evening. Tac 5 mg in AM and 4 mg in the evening, goal 10-12  Prophylaxis: Bactrim, Nystatin and Valcyte    LAST BIOPSY: pending today   LAST ANGIOGRAM: deferred in setting of elevated Cr  Serostatus: CMV: D+/R+, EBV: D+/R+, Toxo D-/R-  Intolerance to medications: None   Rejection history: None     PAST MEDICAL HISTORY:  Past Medical History:   Diagnosis Date     Bariatric surgery status 2003     Benign essential hypertension 05/11/2017     Bilateral carpal tunnel syndrome 11/02/2020     Cardiomyopathy, unspecified (H) 05/08/2017     CKD (chronic kidney disease) stage 3, GFR 30-59 ml/min 05/11/2017     COPD (chronic obstructive pulmonary disease) (H) 11/02/2020     Depression 05/11/2017     Diabetes mellitus (H) 1995     Gouty arthropathy, chronic, without tophi 11/02/2020     H/O gastric bypass 05/11/2017     ICD (implantable cardioverter-defibrillator), biventricular, in situ 05/11/2017     LVAD (left ventricular assist device) present (H)      Major depression, recurrent, chronic (H) 11/02/2020     NICM (nonischemic cardiomyopathy) (H)/ EF 20% 05/11/2017    ECHO: LVEDd. 7.66 cm, Restrictive pattern , Severe mitral valve regurgitation     CECILIA (obstructive  sleep apnea) 2017     Paroxysmal atrial fibrillation (H) 2017     Paroxysmal VT (H) 2017     Rotator cuff tear arthropathy of both shoulders 2020     Uncomplicated asthma      Vitamin B12 deficiency (non anemic) 2017       FAMILY HISTORY:  Family History   Problem Relation Age of Onset     Cerebrovascular Disease Mother 64     Diabetes Mother      Hypertension Mother      Coronary Artery Disease Father      Diabetes Type 2  Father      Obesity Brother      Obesity Brother      Cerebrovascular Disease Daughter 40       SOCIAL HISTORY:  Social History     Socioeconomic History     Marital status:      Spouse name: None     Number of children: None     Years of education: None     Highest education level: None   Occupational History     None   Social Needs     Financial resource strain: None     Food insecurity     Worry: None     Inability: None     Transportation needs     Medical: None     Non-medical: None   Tobacco Use     Smoking status: Former Smoker     Quit date:      Years since quittin.4     Smokeless tobacco: Never Used   Substance and Sexual Activity     Alcohol use: No     Drug use: No     Sexual activity: Yes     Partners: Female   Lifestyle     Physical activity     Days per week: None     Minutes per session: None     Stress: None   Relationships     Social connections     Talks on phone: None     Gets together: None     Attends Shinto service: None     Active member of club or organization: None     Attends meetings of clubs or organizations: None     Relationship status: None     Intimate partner violence     Fear of current or ex partner: None     Emotionally abused: None     Physically abused: None     Forced sexual activity: None   Other Topics Concern     Parent/sibling w/ CABG, MI or angioplasty before 65F 55M? No   Social History Narrative     None       CURRENT MEDICATIONS:  No current facility-administered medications for this encounter.       Current Outpatient Medications   Medication     acetaminophen (TYLENOL) 325 MG tablet     albuterol (PROAIR HFA) 108 (90 Base) MCG/ACT inhaler     allopurinol (ZYLOPRIM) 300 MG tablet     amitriptyline (ELAVIL) 25 MG tablet     aspirin (ASA) 81 MG chewable tablet     buPROPion (WELLBUTRIN) 100 MG tablet     buPROPion (WELLBUTRIN) 75 MG tablet     calcium citrate-vitamin D (CITRACAL) 315-250 MG-UNIT TABS per tablet     Fluticasone-Umeclidin-Vilanterol (TRELEGY ELLIPTA) 100-62.5-25 MCG/INH oral inhaler     gabapentin (NEURONTIN) 300 MG capsule     gabapentin (NEURONTIN) 300 MG capsule     insulin aspart (NOVOLOG PEN) 100 UNIT/ML pen     insulin aspart (NOVOLOG PEN) 100 UNIT/ML pen     insulin glargine (LANTUS PEN) 100 UNIT/ML pen     montelukast (SINGULAIR) 10 MG tablet     mycophenolate (GENERIC EQUIVALENT) 250 MG capsule     nystatin (MYCOSTATIN) 914578 UNIT/ML suspension     pantoprazole (PROTONIX) 40 MG EC tablet     potassium chloride ER (KLOR-CON M) 20 MEQ CR tablet     predniSONE (DELTASONE) 10 MG tablet     rosuvastatin (CRESTOR) 10 MG tablet     senna-docusate (SENOKOT-S/PERICOLACE) 8.6-50 MG tablet     sulfamethoxazole-trimethoprim (BACTRIM) 400-80 MG tablet     tacrolimus (GENERIC EQUIVALENT) 1 MG capsule     traMADol (ULTRAM) 50 MG tablet     valGANciclovir (VALCYTE) 450 MG tablet     ACE/ARB/ARNI NOT PRESCRIBED (INTENTIONAL)     blood glucose monitoring (ONE TOUCH ULTRA 2) meter device kit     blood glucose VI test strip     bumetanide (BUMEX) 1 MG tablet     cyanocobalamin (VITAMIN B12) 1000 MCG/ML injection     insulin pen needle (32G X 4 MM) 32G X 4 MM miscellaneous     predniSONE (DELTASONE) 5 MG tablet     ROS:   CONSTITUTIONAL: Denies fever, chills, fatigue, or weight fluctuations.   HEENT: Denies headache, vision changes, and changes in speech.   CV: Refer to HPI.   PULMONARY:Denies shortness of breath, cough, or previous TB exposure.   GI:Denies nausea, vomiting, diarrhea, and abdominal pain.  "Bowel movements are regular.   :Denies urinary alterations, dysuria, urinary frequency, hematuria, and abnormal drainage.   EXT:Denies lower extremity edema.   SKIN:Denies abnormal rashes or lesions.   MUSCULOSKELETAL:Denies upper or lower extremity weakness and pain.   NEUROLOGIC: Complains of bilateral UE tremors.     EXAM:  BP (!) 119/91 (BP Location: Left arm)   Pulse 106   Temp 97.9  F (36.6  C) (Oral)   Resp 18   Ht 1.727 m (5' 7.99\")   Wt 88.8 kg (195 lb 12.8 oz)   SpO2 99%   BMI 29.78 kg/m    GENERAL: Appears alert and oriented times three.   HEENT: Eye symmetrical and free of discharge bilaterally. Mucous membranes moist and without lesions.  NECK: Supple and without lymphadenopathy. JVD below clavicular line upright  CV: Regular, tachycardic. S1S2 present without murmur, rub, or gallop.   RESPIRATORY: Respirations regular, even, and unlabored. Lungs CTA throughout.   GI: Soft and non distended with normoactive bowel sounds present in all quadrants. No tenderness, rebound, guarding. No organomegaly.   EXTREMITIES: No peripheral edema. 2+ bilateral pedal pulses.   NEUROLOGIC: Alert and orientated x 3. CN II-XII grossly intact. No focal deficits.   MUSCULOSKELETAL: No joint swelling or tenderness.   SKIN: No jaundice. No rashes or lesions.     Labs:  CBC RESULTS:  Lab Results   Component Value Date    WBC 7.7 06/07/2021    RBC 3.48 (L) 06/07/2021    HGB 10.2 (L) 06/07/2021    HCT 34.0 (L) 06/07/2021    MCV 98 06/07/2021    MCH 29.9 06/07/2021    MCHC 30.6 (L) 06/07/2021    RDW 17.1 (H) 06/07/2021     06/07/2021       CMP RESULTS:  Lab Results   Component Value Date     06/07/2021    POTASSIUM 4.2 06/07/2021    CHLORIDE 105 06/07/2021    CO2 30 06/07/2021    ANIONGAP 5 06/07/2021     (H) 06/07/2021    BUN 41 (H) 06/07/2021    CR 1.93 (H) 06/07/2021    GFRESTIMATED 36 (L) 06/07/2021    GFRESTBLACK 41 (L) 06/07/2021    QAMAR 8.2 (L) 06/07/2021    BILITOTAL 0.4 06/07/2021    ALBUMIN 3.2 " (L) 06/07/2021    ALKPHOS 214 (H) 06/07/2021    ALT 54 06/07/2021    AST 10 06/07/2021        INR RESULTS:  Lab Results   Component Value Date    INR 1.19 (H) 05/12/2021       LIPID RESULTS:  Lab Results   Component Value Date    CHOL 209 (H) 11/05/2020    HDL 87 11/05/2020     (H) 11/05/2020    TRIG 91 11/05/2020       IMMUNOSUPPRESSANT LEVELS  Lab Results   Component Value Date    TACROL 6.4 06/02/2021    DOSTAC 8:50 PM 6/1 06/02/2021       No components found for: CK  Lab Results   Component Value Date    MAG 1.7 06/07/2021     Lab Results   Component Value Date    A1C 5.1 05/04/2021     Lab Results   Component Value Date    PHOS 3.7 06/07/2021     Lab Results   Component Value Date    NTBNP 866 (H) 07/31/2019     Lab Results   Component Value Date    SAITESTMET [facility] 05/13/2021    SAICELL Class I 05/13/2021    [id number] Cw:12 05/13/2021    [id number] None 05/13/2021    SAIREPCOM  05/13/2021      Test performed by modified procedure. Serum heat inactivated and tested   by a modified ([place]) protocol including fetal calf serum addition.   High-risk, mfi >3,000. Mod-risk, mfi 500-3,000.       Lab Results   Component Value Date    SAIITESTME [facility] 05/13/2021    SAIICELL Class II 05/13/2021    [id number] None 05/13/2021    [id number] None 05/13/2021    SAIIREPCOM  05/13/2021      Test performed by modified procedure. Serum heat inactivated and tested   by a modified ([place]) protocol including fetal calf serum addition.   High-risk, mfi >3,000. Mod-risk, mfi 500-3,000.       No results found for: CSPEC, CMQNT, CMLOG    Diagnostic Studies:  Echo pending today.     EKG consistent with ST with no acute ST elevation or depression.     Chest X-ray 6/7/21:   Impression:   1. Postsurgical changes of heart transplant with right greater than  left small bilateral pleural effusions.  2. Streaky bibasilar opacities, atelectasis versus pulmonary edema.    RHC 6/7/21:   RA 7  RV 29/7  PA 29/18 (25)  PCWP 14  Pa sat 64.8%,  SaO2 99%  Kee CO/CI 5.34/2.63  TD CO/CI 5.3/2.6  PVR 2.07 (TD)    Assessment and Plan:   Jim Willingham is a 66 year old male with history of NICM EF 20% c/b VT s/p CRT-D s/p HMII LVAD 6/19/2017 with subsequent OHT 5/6/21. His postoperative course was complicated by right pleural air leak with prolonged chest tube and ileus requiring NG tube placement. He presents to 2A for routine testing today with mild hypovolemia and is otherwise doing well.     Status post Heart Transplantation on 5/6/21 due to NICM. RHC with mild hypovolemia and normal CO/CI, and PVR.   Immunosuppression: Cellcept decreased to 1000 mg po BID per Dr. Montgomery. Prednisone 20 mg in AM and 5 mg in the evening. Tac 5 mg in AM and 4 mg in the evening, goal 10-12. Level pending this AM will be an 11 hour trough.   Serostatus: CMV: D+/R+, EBV: D+/R+, Toxo D-/R-  Prophylaxis: Bactrim, Nystatin and Valcyte  - echo pending today to assess graft function.   - Decrease Prednisone pending negative biopsy.   - He will start Cardiac rehab soon.   - Repeat Chest X-ray 6/21. No acute symptoms, leukocytosis, or fever.     Next Biopsy: pending today  Next Angiogram: deferred in setting of Cr   Next Angiogram with IVUS: yes  Next Stress Test: Per protocol    Dermatology evaluation: Annually   Opthalmology evaluation: Annually     Follow up 6/21/21 as scheduled.       Soraya Marshall, TIM CNP  6/7/2021          [facility]

## 2021-06-07 NOTE — TELEPHONE ENCOUNTER
Per Dr. Montgomery, angiogram that was scheduled for today 6/7 deferred for 1 month due to Cr = 1.8. Orders communicated to Megan cath lab charge nurse and [email or web address] message sent to patient.

## 2021-06-07 NOTE — PROGRESS NOTES
Pt was prepped for a right heart cath and biopsy.The CORS was discontinued.LMX cream applied to neck.Consent was signed and questions answered.Pt's wife Cate will pick pt up.Discharge instructions were reviewed and signed since pt has had several before and knows how to take care of his neck.

## 2021-06-07 NOTE — TELEPHONE ENCOUNTER
Call returned. Verbal orders given for weekly labs including bmp, cbc, mg, phos, and tacro. Also, Pt/OT orders given. No further questions at this time.

## 2021-06-07 NOTE — TELEPHONE ENCOUNTER
Provider Call: Home Care  Route to N  Facility Name:   Facility Location: [phone]   Reason for Call: lab order   Needs verbal for Nursing and pt ot for      May leave message for HC nurse     Callback needed? Yes    Return Call Needed  Same as documented in contacts section  When to return call?: Greater than one day: Route standard priority

## 2021-06-07 NOTE — IP AVS SNAPSHOT
[facility] [address] [address]                                    After Visit Summary   6/7/2021    Jim Willingham    MRN: [id number]           After Visit Summary Signature Page    I have received my discharge instructions, and my questions have been answered. I have discussed any challenges I see with this plan with the nurse or doctor.    ..........................................................................................................................................  Patient/Patient Representative Signature      ..........................................................................................................................................  Patient Representative Print Name and Relationship to Patient    ..................................................               ................................................  Date                                   Time    ..........................................................................................................................................  Reviewed by Signature/Title    ...................................................              ..............................................  Date                                               Time          22EPIC Rev 08/18

## 2021-06-07 NOTE — PROGRESS NOTES
Patient tolerated recovery stage well. VSS, right neck site clean/dry/intact, no hematoma, and denies pain. Patient tolerated PO food and fluids. Teaching was done and discharge instructions were given. Patient ambulated, voided. Patient discharged from the hospital via wheel chair to home with wife.

## 2021-06-07 NOTE — IP AVS SNAPSHOT
After Visit Summary Template Not Found    This Print Group is only intended to be used in the After Visit Summary and can only be used in a report that uses a released After Visit Summary Template.                       MRN:[id number]                      After Visit Summary   6/7/2021    Jim Willingham    MRN: [id number]           Visit Information        Department      6/7/2021  7:05 AM [facility] [address] [place]          Review of your medicines      UNREVIEWED medicines. Ask your doctor about these medicines       Dose / Directions   acetaminophen 325 MG tablet  Commonly known as: TYLENOL  Used for: S/P orthotopic heart transplant (H)      Dose: 650 mg  Take 2 tablets (650 mg) by mouth every 4 hours as needed for other (For optimal non-opioid multimodal pain management to improve pain control.)  Quantity: 100 tablet  Refills: 1     albuterol 108 (90 Base) MCG/ACT inhaler  Commonly known as: ProAir HFA  Used for: S/P orthotopic heart transplant (H)      Dose: 2 puff  Inhale 2 puffs into the lungs every 4 hours as needed for shortness of breath / dyspnea or wheezing  Quantity: 6.7 g  Refills: 1     allopurinol 300 MG tablet  Commonly known as: ZYLOPRIM  Used for: 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, unspecified laterality      Dose: 300 mg  Take 1 tablet (300 mg) by mouth daily  Quantity: 90 tablet  Refills: 1     amitriptyline 25 MG tablet  Commonly known as: ELAVIL  Used for: S/P orthotopic heart transplant (H)      Dose: 37.5 mg  Take 1.5 tablets (37.5 mg) by mouth At Bedtime  Quantity: 30 tablet  Refills: 1     aspirin 81 MG chewable tablet  Commonly known as: ASA  Used for: S/P orthotopic heart transplant (H)      Dose: 81 mg  Take 1 tablet (81 mg) by mouth daily  Quantity: 100 tablet  Refills: 1     bumetanide 1 MG tablet  Commonly known as: BUMEX  Used for: S/P orthotopic heart transplant (H)      Dose: 2 mg  Take 2 tablets (2 mg) by mouth 2 times  daily  Quantity: 60 tablet  Refills: 1     * buPROPion 75 MG tablet  Commonly known as: WELLBUTRIN  Used for: S/P orthotopic heart transplant (H)      Dose: 75 mg  Take 1 tablet (75 mg) by mouth 2 times daily  Quantity: 60 tablet  Refills: 1     * buPROPion 100 MG tablet  Commonly known as: WELLBUTRIN  Used for: S/P orthotopic heart transplant (H)      Dose: 50 mg  0.5 tablets (50 mg) by Oral or Feeding Tube route every evening  Quantity: 30 tablet  Refills: 1     calcium citrate-vitamin D 315-250 MG-UNIT Tabs per tablet  Commonly known as: CITRACAL  Used for: S/P orthotopic heart transplant (H)      Dose: 1 tablet  Take 1 tablet by mouth 2 times daily  Quantity: 60 tablet  Refills: 1     cyanocobalamin 1000 MCG/ML injection  Commonly known as: CYANOCOBALAMIN      Dose: 1,000 mcg  Inject 1,000 mcg into the muscle every 30 days  Refills: 0     Fluticasone-Umeclidin-Vilanterol 100-62.5-25 MCG/INH oral inhaler  Commonly known as: TRELEGY ELLIPTA  Used for: Chronic obstructive pulmonary disease, unspecified COPD type (H)      Dose: 1 puff  Inhale 1 puff into the lungs daily  Quantity: 1 each  Refills: 1     * gabapentin 300 MG capsule  Commonly known as: NEURONTIN  Used for: S/P orthotopic heart transplant (H)      Dose: 300 mg  Take 1 capsule (300 mg) by mouth every morning  Quantity: 30 capsule  Refills: 1     * gabapentin 300 MG capsule  Commonly known as: NEURONTIN  Used for: S/P orthotopic heart transplant (H)      Dose: 600 mg  Take 2 capsules (600 mg) by mouth 2 times daily At 12pm and 8pm  Quantity: 60 capsule  Refills: 1     * insulin aspart 100 UNIT/ML pen  Commonly known as: NovoLOG PEN  Used for: S/P orthotopic heart transplant (H)      Dose: 1-10 Units  Inject 1-10 Units Subcutaneous 3 times daily (before meals) Correction Scale - HIGH INSULIN RESISTANCE DOSING     Do Not give Correction Insulin if Pre-Meal BG less than 140.   For Pre-Meal  - 164 give 1 unit.   For Pre-Meal  - 189 give 2 units.    For Pre-Meal  - 214 give 3 units.   For Pre-Meal  - 239 give 4 units.   For Pre-Meal  - 264 give 5 units.   For Pre-Meal  - 289 give 6 units.   For Pre-Meal  - 314 give 7 units.   For Pre-Meal  - 339 give 8 units.   For Pre-Meal  - 364 give 9 units.   For Pre-Meal BG greater than or equal to 365 give 10 units  To be given with prandial insulin, and based on pre-meal blood glucose.   Notify provider if glucose greater than or equal to 350 mg/dL after administration of correction dose. If given at mealtime, administer within 30 minutes of start of meal  Quantity: 30 mL  Refills: 1     * insulin aspart 100 UNIT/ML pen  Commonly known as: NovoLOG PEN  Used for: S/P orthotopic heart transplant (H)      Dose: 1-7 Units  Inject 1-7 Units Subcutaneous At Bedtime HIGH INSULIN RESISTANCE DOSING    Do Not give Bedtime Correction Insulin if BG less than 200.   For  - 224 give 1 units.   For  - 249 give 2 units.   For  - 274 give 3 units.   For  - 299 give 4 units.   For  - 324 give 5 units.   For  - 349 give 6 units.   For BG greater than or equal to 350 give 7 units.   Notify provider if glucose greater than or equal to 350 mg/dL after administration of correction dose. If given at mealtime, administer within 30 minutes of start of meal  Quantity: 15 mL  Refills: 1     insulin glargine 100 UNIT/ML pen  Commonly known as: LANTUS PEN  Used for: S/P orthotopic heart transplant (H)      Dose: 20 Units  Inject 20 Units Subcutaneous every morning (before breakfast)  Quantity: 15 mL  Refills: 1     montelukast 10 MG tablet  Commonly known as: Singulair  Used for: S/P orthotopic heart transplant (H)      Dose: 10 mg  Take 1 tablet (10 mg) by mouth At Bedtime  Quantity: 90 tablet  Refills: 1     mycophenolate 250 MG capsule  Commonly known as: GENERIC EQUIVALENT  Used for: S/P orthotopic heart transplant (H)      Dose: 1,500 mg  Take 6 capsules (1,500 mg)  by mouth 2 times daily  Quantity: 360 capsule  Refills: 1     nystatin 583503 UNIT/ML suspension  Commonly known as: MYCOSTATIN  Used for: S/P orthotopic heart transplant (H)      Dose: 1,000,000 Units  Swish and swallow 10 mLs (1,000,000 Units) in mouth 4 times daily  Quantity: 1200 mL  Refills: 1     pantoprazole 40 MG EC tablet  Commonly known as: PROTONIX  Used for: S/P orthotopic heart transplant (H)      Dose: 40 mg  Take 1 tablet (40 mg) by mouth 2 times daily  Quantity: 60 tablet  Refills: 1     potassium chloride ER 20 MEQ CR tablet  Commonly known as: KLOR-CON M  Used for: Chronic systolic congestive heart failure (H)      Dose: 20 mEq  Take 1 tablet (20 mEq) by mouth 2 times daily  Quantity: 60 tablet  Refills: 1     * predniSONE 10 MG tablet  Commonly known as: DELTASONE  Used for: S/P orthotopic heart transplant (H)      Dose: 20 mg  Take 2 tablets (20 mg) by mouth every morning  Quantity: 120 tablet  Refills: 1     * predniSONE 5 MG tablet  Commonly known as: DELTASONE  Used for: S/P orthotopic heart transplant (H)      Dose: 10 mg  Take 2 tablets (10 mg) by mouth every evening  Quantity: 90 tablet  Refills: 1     rosuvastatin 10 MG tablet  Commonly known as: CRESTOR  Used for: S/P orthotopic heart transplant (H)      Dose: 10 mg  Take 1 tablet (10 mg) by mouth daily  Quantity: 90 tablet  Refills: 1     senna-docusate 8.6-50 MG tablet  Commonly known as: SENOKOT-S/PERICOLACE  Used for: S/P orthotopic heart transplant (H)      Dose: 2 tablet  Take 2 tablets by mouth daily as needed for constipation  Quantity: 180 tablet  Refills: 3     sulfamethoxazole-trimethoprim 400-80 MG tablet  Commonly known as: BACTRIM  Indication: PCP ppx  Used for: S/P orthotopic heart transplant (H)      Dose: 1 tablet  Take 1 tablet by mouth every 48 hours  Quantity: 30 tablet  Refills: 1     tacrolimus 1 MG capsule  Commonly known as: GENERIC EQUIVALENT  Used for: S/P orthotopic heart transplant (H)      Take 5 capsules (5  mg) by mouth every morning AND 4 capsules (4 mg) every evening.  Quantity: 810 capsule  Refills: 1     traMADol 50 MG tablet  Commonly known as: ULTRAM  Used for: S/P orthotopic heart transplant (H)      Dose: 25-50 mg  Take 0.5-1 tablets (25-50 mg) by mouth every 6 hours as needed for moderate pain  Quantity: 25 tablet  Refills: 1     valGANciclovir 450 MG tablet  Commonly known as: VALCYTE  Indication: Medication Treatment to Prevent Cytomegalovirus Disease  Used for: S/P orthotopic heart transplant (H)      Dose: 450 mg  Take 1 tablet (450 mg) by mouth daily  Quantity: 90 tablet  Refills: 1         * This list has 8 medication(s) that are the same as other medications prescribed for you. Read the directions carefully, and ask your doctor or other care provider to review them with you.            CONTINUE these medicines which have NOT CHANGED       Dose / Directions   ACE/ARB/ARNI NOT PRESCRIBED  Commonly known as: INTENTIONAL      ACE & ARB not prescribed due to {CHOOSE REASON BEFORE SIGNING!!!:027371}  Refills: 0     blood glucose monitoring meter device kit  Used for: Type 2 diabetes mellitus with diabetic neuropathy, without long-term current use of insulin (H)      Use to test blood sugar four times daily or as directed.  Quantity: 1 kit  Refills: 0     blood glucose test strip  Commonly known as: NO BRAND SPECIFIED  Used for: Type 2 diabetes mellitus with diabetic neuropathy, without long-term current use of insulin (H)      Use to test blood sugar four times daily or as directed.  Quantity: 200 each  Refills: 0     insulin pen needle 32G X 4 MM miscellaneous  Commonly known as: 32G X 4 MM  Used for: Type 2 diabetes mellitus with diabetic neuropathy, without long-term current use of insulin (H)      Use four pen needles daily or as directed.  Quantity: 110 each  Refills: 0              Protect others around you: Learn how to safely use, store and throw away your medicines at www.disposemymeds.org.        Follow-ups after your visit       Your next 10 appointments already scheduled    Jun 07, 2021  Procedure with Jeffrey Gibson MD  [facility] ([facility] ) [address] [address]  [phone]   The [facility] is located on the corner of [address] [facility] on the [facility]. It is easily accessible from virtually any point in the [facility] area, via [address] and [address].   Jun 07, 2021 12:00 PM  Telephone Visit with Akshat Parkinson [facility] ([facility] ) [address]  [address] [facility] [id number] [phone]   Please Note: This is a virtual visit; there is no need to come to the facility.       Jun 07, 2021  2:30 PM  Echo Complete with CHON  [facility] ([facility] ) [address] [facility] [address]  [phone]   1. Please bring or wear a comfortable two-piece outfit.  2. You may eat, drink and take your normal medicines.  3. Please do not apply perfumes or lotions on the day of your exam.   4. For any questions that cannot be answered, please contact the ordering physician     Jun 12, 2021 11:10 AM  Pre-procedure Covid with Bk Curbside Covid Md Tamy 1  [facility] ([facility] ) [address] [address]  [phone]   You have been scheduled for a COVID-19 testing at [appointment time]. Please note we offer nasopharyngeal swabs only at this location.  Please note this appointment time is approximate, thank you in advance for your patience while you wait your  turn to be tested. Please wear a mask or face covering to the testing site. Stay in your vehicle until your appointment time. We ask that no patients come in to the clinic prior to their visit. [facility] is limiting visitors to necessary care givers only. Minors or patient who require additional supervision should only have one parent or guardian with them. Siblings, friends, family and other guests are asked to stay at home or outside of the clinic to limit the amount of people in the facility and promote social distancing.    Have your ID out and ready to show staff when you arrive.    At your scheduled time, present to [facility] - [address], [place] [address]. Enter the building and follow signs to the [email or web address] testing reardon.      Maximus 15, 2021 11:00 AM  (Arrive by 10:45 AM)  CARDIAC DEVICE CHECK - IN CLINIC with  CV DEVICE 1  [facility] ([facility] [place] ) [address] [place] [address]  3rd Floor  [facility] [address] [phone]      Maximus 15, 2021 11:30 AM  (Arrive by 11:15 AM)  RETURN ARRHYTHMIA with Darcie Gonzales MD  [facility] ([facility] [place] ) [address] [place] [address] [phone]      Jun 21, 2021 10:00 AM  LAB with UU LAB GOLD WAITING  [facility] ([facility] ) [address] [facility] [address]   Please do not eat 10-12 hours before your appointment if you are coming in fasting for labs on lipids, cholesterol, or glucose (sugar). Does not apply to pregnant women. Water, tea and black coffee (with nothing added) is okay. Do not drink other fluids, diet soda or gum. If you have concerns about taking your medications, please send a message by clicking on Secure Messaging, Message Your Care Team.     Jun 21, 2021 10:30 AM  Procedure  - 2.5 hour with U2A ROOM 9  [facility] [address] [place] ([facility] ) [address] [facility] [address]      Jun 21, 2021  Procedure with Cardiologist MD Sravani  [facility] ([facility] ) [address] [facility] [address]  [phone]   The [facility] is located on the corner of [address] and [facility] on the [facility]. It is easily accessible from virtually any point in the [facility] area, via [address] and [address].   Jul 05, 2021  Procedure with Cardiologist MD Sravani  [facility] ([facility] ) [address] [facility] [address]  [phone]   The [facility] is located on the corner of [facility] and [facility] on the [facility]. It is easily accessible from virtually any point in the [facility] area, via [address] and [address].      Care Instructions       Further instructions from your care team       [facility]                        Interventional Cardiology  Discharge Instructions   Post Right Heart Cath      AFTER YOU GO HOME:    DO drink plenty of fluids    DO resume your regular diet and medications unless otherwise instructed by your Primary Physician    Do Not scrub the procedure site vigorously    No lotion or powder to the puncture site for 3 days    CALL YOUR PRIMARY PHYSICIAN IF: You may resume all normal activity.  Monitor neck site for bleeding, swelling, or voice changes. If you notice bleeding or swelling immediately apply pressure to the site and call  number below to speak with Cardiology Fellow.  If you experience any changes in your breathing you should call your doctor immediately or come to the closest Emergency Department.  Do not drive yourself.    ADDITIONAL INSTRUCTIONS: Medications: You are to resume all home medications including anticoagulation therapy unless otherwise advised by your primary cardiologist or nurse coordinator.    Follow Up: Per your primary cardiology team    If you have any questions or concerns regarding your procedure site please call [phone] at anytime and ask for Cardiology Fellow on call.  They are available 24 hours a day.  You may also contact the Cardiology Clinic after hours number at [phone].                                                       Telephone Numbers [phone] Monday-Friday 8:00 am to 4:30 pm    [phone] After 4:30 pm Monday-Friday, Weekends & Holidays  Ask for Interventional Cardiologist on call. Someone is on call 24 hours/day   [facility] toll free number [phone] Monday-Friday 8:00 am to 4:30 pm   [facility] Emergency Dept [phone]                   Additional Information About Your Visit       [employer] Information    [employer] gives you secure access to your electronic health record. If you see a primary care provider, you can also send messages to your care team and make appointments. If you have questions, please call your primary care clinic.  If you do not have a primary care provider, please call [phone] and they will assist you.       Care EveryWhere ID    This is your Care EveryWhere ID. This could be used by other organizations to access your [facility] medical records  [id number]        Primary Care Provider Office Phone # Fax #    Ricardo Gómez [phone]      Equal Access to Services    [facility] : Rajeev Gastelum, gatito james, kendra vlilar. So [facility]  [phone].    ATENCIÓN: Si yrn garcia, tiene a roger disposición servicios gratuitos de asistencia lingüística. Guerline al [phone].    We comply with applicable federal and state civil rights laws, including the [place] Human Rights Act. We do not discriminate on the basis of race, color, creed, Zoroastrian, national origin, marital status, age, disability, sex, sexual orientation, or gender identity.    If you would like an itemization of your charges they will now be available in [employer] 30 days after discharge. To access the itemized statements in [employer] go to billing/billing summary. From there select view account. There will be multiple tabs showing an overview of your account, detail, payments, and communications. From the communications tab you can see your monthly statements, your itemized statements, and any billing letters generated for your account. If you do not have a [employer] account and need help getting access please contact [employer] support at [phone].  If you would prefer to have your itemized statements mailed please contact our automated itemized bill request line at [phone] option  2.       Thank you!    Thank you for choosing [facility] for your care. Our goal is always to provide you with excellent care. Hearing back from our patients is one way we can continue to improve our services. Please take a few minutes to complete the written survey that you may receive in the mail after you visit with us. Thank you!            Medication List      Medications          Morning Afternoon Evening Bedtime As Needed    ACE/ARB/ARNI NOT PRESCRIBED  Also known as: INTENTIONAL  INSTRUCTIONS: ACE & ARB not prescribed due to {CHOOSE REASON BEFORE SIGNING!!!:055107}                     blood glucose monitoring meter device kit  INSTRUCTIONS: Use to test blood sugar four times daily or as directed.                     blood glucose test strip  Also known as: NO BRAND SPECIFIED  INSTRUCTIONS: Use to  test blood sugar four times daily or as directed.                     insulin pen needle 32G X 4 MM miscellaneous  Also known as: 32G X 4 MM  INSTRUCTIONS: Use four pen needles daily or as directed.                       ASK your doctor about these medications          Morning Afternoon Evening Bedtime As Needed    mycophenolate 250 MG capsule  Also known as: GENERIC EQUIVALENT  INSTRUCTIONS: Take 6 capsules (1,500 mg) by mouth 2 times daily  This medication is very important: It prevents organ rejection.                     * predniSONE 10 MG tablet  Also known as: DELTASONE  INSTRUCTIONS: Take 2 tablets (20 mg) by mouth every morning  This medication is very important: It prevents organ rejection.                     * predniSONE 5 MG tablet  Also known as: DELTASONE  INSTRUCTIONS: Take 2 tablets (10 mg) by mouth every evening  Doctor's comments: TXP DT 5/6/2021 (Heart) TXP Dischg DT 5/31/2021 DX Heart replaced by transplant Z94.1 [facility] ([place], [place])  This medication is very important: It prevents organ rejection.                     tacrolimus 1 MG capsule  Also known as: GENERIC EQUIVALENT  INSTRUCTIONS: Take 5 capsules (5 mg) by mouth every morning AND 4 capsules (4 mg) every evening.  Doctor's comments: TXP DT 5/6/2021 (Heart) TXP Dischg DT 5/31/2021 DX Heart replaced by transplant Z94.1 [facility] ([place], [place])  This medication is very important: It prevents organ rejection.                     sulfamethoxazole-trimethoprim 400-80 MG tablet  Also known as: BACTRIM  INSTRUCTIONS: Take 1 tablet by mouth every 48 hours  Reason for med: PCP ppx  This medication is very important: It prevents dangerous infection.                     valGANciclovir 450 MG tablet  Also known as: VALCYTE  INSTRUCTIONS: Take 1 tablet (450 mg) by mouth daily  Reason for med: Medication Treatment to Prevent Cytomegalovirus  Disease  This medication is very important: It prevents dangerous infection.                     acetaminophen 325 MG tablet  Also known as: TYLENOL  INSTRUCTIONS: Take 2 tablets (650 mg) by mouth every 4 hours as needed for other (For optimal non-opioid multimodal pain management to improve pain control.)                     albuterol 108 (90 Base) MCG/ACT inhaler  Also known as: ProAir HFA  INSTRUCTIONS: Inhale 2 puffs into the lungs every 4 hours as needed for shortness of breath / dyspnea or wheezing  Doctor's comments: Pharmacy may dispense brand covered by insurance (Proair, or proventil or ventolin or generic albuterol inhaler)                     allopurinol 300 MG tablet  Also known as: ZYLOPRIM  INSTRUCTIONS: Take 1 tablet (300 mg) by mouth daily                     amitriptyline 25 MG tablet  Also known as: ELAVIL  INSTRUCTIONS: Take 1.5 tablets (37.5 mg) by mouth At Bedtime                     aspirin 81 MG chewable tablet  Also known as: ASA  INSTRUCTIONS: Take 1 tablet (81 mg) by mouth daily                     bumetanide 1 MG tablet  Also known as: BUMEX  INSTRUCTIONS: Take 2 tablets (2 mg) by mouth 2 times daily                     * buPROPion 75 MG tablet  Also known as: WELLBUTRIN  INSTRUCTIONS: Take 1 tablet (75 mg) by mouth 2 times daily                     * buPROPion 100 MG tablet  Also known as: WELLBUTRIN  INSTRUCTIONS: 0.5 tablets (50 mg) by Oral or Feeding Tube route every evening                     calcium citrate-vitamin D 315-250 MG-UNIT Tabs per tablet  Also known as: CITRACAL  INSTRUCTIONS: Take 1 tablet by mouth 2 times daily                     cyanocobalamin 1000 MCG/ML injection  Also known as: CYANOCOBALAMIN  INSTRUCTIONS: Inject 1,000 mcg into the muscle every 30 days                     Fluticasone-Umeclidin-Vilanterol 100-62.5-25 MCG/INH oral inhaler  Also known as: TRELEGY ELLIPTA  INSTRUCTIONS: Inhale 1 puff into the lungs daily                     * gabapentin 300 MG  capsule  Also known as: NEURONTIN  INSTRUCTIONS: Take 1 capsule (300 mg) by mouth every morning                     * gabapentin 300 MG capsule  Also known as: NEURONTIN  INSTRUCTIONS: Take 2 capsules (600 mg) by mouth 2 times daily At 12pm and 8pm                     * insulin aspart 100 UNIT/ML pen  Also known as: NovoLOG PEN  INSTRUCTIONS: Inject 1-10 Units Subcutaneous 3 times daily (before meals) Correction Scale - HIGH INSULIN RESISTANCE DOSING     Do Not give Correction Insulin if Pre-Meal BG less than 140.   For Pre-Meal  - 164 give 1 unit.   For Pre-Meal  - 189 give 2 units.   For Pre-Meal  - 214 give 3 units.   For Pre-Meal  - 239 give 4 units.   For Pre-Meal  - 264 give 5 units.   For Pre-Meal  - 289 give 6 units.   For Pre-Meal  - 314 give 7 units.   For Pre-Meal  - 339 give 8 units.   For Pre-Meal  - 364 give 9 units.   For Pre-Meal BG greater than or equal to 365 give 10 units  To be given with prandial insulin, and based on pre-meal blood glucose.   Notify provider if glucose greater than or equal to 350 mg/dL after administration of correction dose. If given at mealtime, administer within 30 minutes of start of meal                     * insulin aspart 100 UNIT/ML pen  Also known as: NovoLOG PEN  INSTRUCTIONS: Inject 1-7 Units Subcutaneous At Bedtime HIGH INSULIN RESISTANCE DOSING    Do Not give Bedtime Correction Insulin if BG less than 200.   For  - 224 give 1 units.   For  - 249 give 2 units.   For  - 274 give 3 units.   For  - 299 give 4 units.   For  - 324 give 5 units.   For  - 349 give 6 units.   For BG greater than or equal to 350 give 7 units.   Notify provider if glucose greater than or equal to 350 mg/dL after administration of correction dose. If given at mealtime, administer within 30 minutes of start of meal                     insulin glargine 100 UNIT/ML pen  Also known as: LANTUS  PEN  INSTRUCTIONS: Inject 20 Units Subcutaneous every morning (before breakfast)  Doctor's comments: If Lantus is not covered by insurance, may substitute Basaglar at same dose and frequency.                       montelukast 10 MG tablet  Also known as: Singulair  INSTRUCTIONS: Take 1 tablet (10 mg) by mouth At Bedtime                     nystatin [id number] UNIT/ML suspension  Also known as: MYCOSTATIN  INSTRUCTIONS: Swish and swallow 10 mLs (1,000,000 Units) in mouth 4 times daily                     pantoprazole 40 MG EC tablet  Also known as: PROTONIX  INSTRUCTIONS: Take 1 tablet (40 mg) by mouth 2 times daily                     potassium chloride ER 20 MEQ CR tablet  Also known as: KLOR-CON M  INSTRUCTIONS: Take 1 tablet (20 mEq) by mouth 2 times daily                     rosuvastatin 10 MG tablet  Also known as: CRESTOR  INSTRUCTIONS: Take 1 tablet (10 mg) by mouth daily                     senna-docusate 8.6-50 MG tablet  Also known as: SENOKOT-S/PERICOLACE  INSTRUCTIONS: Take 2 tablets by mouth daily as needed for constipation                     traMADol 50 MG tablet  Also known as: ULTRAM  INSTRUCTIONS: Take 0.5-1 tablets (25-50 mg) by mouth every 6 hours as needed for moderate pain                        * This list has 8 medication(s) that are the same as other medications prescribed for you. Read the directions carefully, and ask your doctor or other care provider to review them with you.

## 2021-06-08 ENCOUNTER — TELEPHONE (OUTPATIENT)
Dept: TRANSPLANT | Facility: CLINIC | Age: 66
End: 2021-06-08

## 2021-06-08 DIAGNOSIS — Z11.59 ENCOUNTER FOR SCREENING FOR OTHER VIRAL DISEASES: ICD-10-CM

## 2021-06-08 DIAGNOSIS — Z94.1 S/P ORTHOTOPIC HEART TRANSPLANT (H): ICD-10-CM

## 2021-06-08 DIAGNOSIS — Z94.1 TRANSPLANTED HEART (H): Primary | ICD-10-CM

## 2021-06-08 LAB
COPATH REPORT: NORMAL
INTERPRETATION ECG - MUSE: NORMAL

## 2021-06-08 RX ORDER — BUMETANIDE 1 MG/1
2 TABLET ORAL DAILY
Qty: 90 TABLET | Refills: 11 | Status: ON HOLD | OUTPATIENT
Start: 2021-06-08 | End: 2021-07-05

## 2021-06-08 RX ORDER — MYCOPHENOLATE MOFETIL 250 MG/1
1000 CAPSULE ORAL 2 TIMES DAILY
Qty: 360 CAPSULE | Refills: 1 | Status: ON HOLD | OUTPATIENT
Start: 2021-06-08 | End: 2021-07-19

## 2021-06-08 NOTE — TELEPHONE ENCOUNTER
Call returned to patient to discuss recent 2A visit.     Decrease bumex to 2 mg once a day.     Would also like to start his cellcept out at 1000 BID (older, higher risk of infection/cancer than rejection)     If negative biposy, decrease prednisone  to 20/5     Echo and RHC number reviewed by Dr. Montgomery    Home health RN once a week. Thursdays    CXR reviewed with Soraya Marshall, repeat on 6/21. Scheduled for 930.     Next appointments on 6/21.

## 2021-06-09 LAB
DONOR IDENTIFICATION: NORMAL
DSA COMMENTS: NORMAL
DSA PRESENT: NO
DSA TEST METHOD: NORMAL
EBV DNA # SPEC NAA+PROBE: 3509 {COPIES}/ML
EBV DNA SPEC NAA+PROBE-LOG#: 3.5 {LOG_COPIES}/ML
HBV DNA SERPL QL NAA+PROBE: NORMAL
HCV RNA SERPL QL NAA+PROBE: NORMAL
HIV1+2 RNA SERPL QL NAA+PROBE: NORMAL
ORGAN: NORMAL
SA1 CELL: NORMAL
SA1 COMMENTS: NORMAL
SA1 HI RISK ABY: NORMAL
SA1 MOD RISK ABY: NORMAL
SA1 TEST METHOD: NORMAL
SA2 CELL: NORMAL
SA2 COMMENTS: NORMAL
SA2 HI RISK ABY UA: NORMAL
SA2 MOD RISK ABY: NORMAL
SA2 TEST METHOD: NORMAL
UNACCEPTABLE ANTIGEN: NORMAL
UNOS CPRA: 0

## 2021-06-10 ENCOUNTER — VIRTUAL VISIT (OUTPATIENT)
Dept: PHARMACY | Facility: CLINIC | Age: 66
End: 2021-06-10
Payer: COMMERCIAL

## 2021-06-10 ENCOUNTER — MEDICAL CORRESPONDENCE (OUTPATIENT)
Dept: HEALTH INFORMATION MANAGEMENT | Facility: CLINIC | Age: 66
End: 2021-06-10

## 2021-06-10 ENCOUNTER — NURSE TRIAGE (OUTPATIENT)
Dept: NURSING | Facility: CLINIC | Age: 66
End: 2021-06-10

## 2021-06-10 DIAGNOSIS — K21.9 GASTROESOPHAGEAL REFLUX DISEASE, UNSPECIFIED WHETHER ESOPHAGITIS PRESENT: ICD-10-CM

## 2021-06-10 DIAGNOSIS — K59.00 CONSTIPATION, UNSPECIFIED CONSTIPATION TYPE: ICD-10-CM

## 2021-06-10 DIAGNOSIS — M12.812 ROTATOR CUFF TEAR ARTHROPATHY OF BOTH SHOULDERS: ICD-10-CM

## 2021-06-10 DIAGNOSIS — E11.8 TYPE 2 DIABETES MELLITUS WITH COMPLICATION, WITH LONG-TERM CURRENT USE OF INSULIN (H): ICD-10-CM

## 2021-06-10 DIAGNOSIS — M75.102 ROTATOR CUFF TEAR ARTHROPATHY OF BOTH SHOULDERS: ICD-10-CM

## 2021-06-10 DIAGNOSIS — Z94.1 TRANSPLANTED HEART (H): Primary | ICD-10-CM

## 2021-06-10 DIAGNOSIS — M12.811 ROTATOR CUFF TEAR ARTHROPATHY OF BOTH SHOULDERS: ICD-10-CM

## 2021-06-10 DIAGNOSIS — Z79.4 TYPE 2 DIABETES MELLITUS WITH COMPLICATION, WITH LONG-TERM CURRENT USE OF INSULIN (H): ICD-10-CM

## 2021-06-10 DIAGNOSIS — M75.101 ROTATOR CUFF TEAR ARTHROPATHY OF BOTH SHOULDERS: ICD-10-CM

## 2021-06-10 LAB
ALBUMIN SERPL-MCNC: 3.1 G/DL (ref 3.4–5)
ALP SERPL-CCNC: 174 U/L (ref 40–150)
ALT SERPL W P-5'-P-CCNC: 33 U/L (ref 0–70)
ANION GAP SERPL CALCULATED.3IONS-SCNC: 11 MMOL/L (ref 3–14)
AST SERPL W P-5'-P-CCNC: 8 U/L (ref 0–45)
BASOPHILS # BLD AUTO: 0 10E9/L (ref 0–0.2)
BASOPHILS NFR BLD AUTO: 0.1 %
BILIRUB SERPL-MCNC: 0.4 MG/DL (ref 0.2–1.3)
BUN SERPL-MCNC: 39 MG/DL (ref 7–30)
CALCIUM SERPL-MCNC: 8.4 MG/DL (ref 8.5–10.1)
CHLORIDE SERPL-SCNC: 103 MMOL/L (ref 94–109)
CO2 SERPL-SCNC: 23 MMOL/L (ref 20–32)
CREAT SERPL-MCNC: 1.82 MG/DL (ref 0.66–1.25)
DIFFERENTIAL METHOD BLD: ABNORMAL
EOSINOPHIL # BLD AUTO: 0 10E9/L (ref 0–0.7)
EOSINOPHIL NFR BLD AUTO: 0.1 %
ERYTHROCYTE [DISTWIDTH] IN BLOOD BY AUTOMATED COUNT: 16.6 % (ref 10–15)
GFR SERPL CREATININE-BSD FRML MDRD: 38 ML/MIN/{1.73_M2}
GLUCOSE SERPL-MCNC: 190 MG/DL (ref 70–99)
HCT VFR BLD AUTO: 33.5 % (ref 40–53)
HGB BLD-MCNC: 10.4 G/DL (ref 13.3–17.7)
IMM GRANULOCYTES # BLD: 0.3 10E9/L (ref 0–0.4)
IMM GRANULOCYTES NFR BLD: 2.9 %
LYMPHOCYTES # BLD AUTO: 1.5 10E9/L (ref 0.8–5.3)
LYMPHOCYTES NFR BLD AUTO: 15.1 %
MAGNESIUM SERPL-MCNC: 1.7 MG/DL (ref 1.6–2.3)
MCH RBC QN AUTO: 29.6 PG (ref 26.5–33)
MCHC RBC AUTO-ENTMCNC: 31 G/DL (ref 31.5–36.5)
MCV RBC AUTO: 95 FL (ref 78–100)
MONOCYTES # BLD AUTO: 0.9 10E9/L (ref 0–1.3)
MONOCYTES NFR BLD AUTO: 9.2 %
NEUTROPHILS # BLD AUTO: 7.1 10E9/L (ref 1.6–8.3)
NEUTROPHILS NFR BLD AUTO: 72.6 %
NRBC # BLD AUTO: 0 10*3/UL
NRBC BLD AUTO-RTO: 0 /100
PHOSPHATE SERPL-MCNC: 3.2 MG/DL (ref 2.5–4.5)
PLATELET # BLD AUTO: 406 10E9/L (ref 150–450)
POTASSIUM SERPL-SCNC: 4.5 MMOL/L (ref 3.4–5.3)
PROT SERPL-MCNC: 5.9 G/DL (ref 6.8–8.8)
RBC # BLD AUTO: 3.51 10E12/L (ref 4.4–5.9)
SODIUM SERPL-SCNC: 137 MMOL/L (ref 133–144)
TACROLIMUS BLD-MCNC: 7.6 UG/L (ref 5–15)
TME LAST DOSE: NORMAL H
WBC # BLD AUTO: 9.7 10E9/L (ref 4–11)

## 2021-06-10 PROCEDURE — 83735 ASSAY OF MAGNESIUM: CPT

## 2021-06-10 PROCEDURE — 85025 COMPLETE CBC W/AUTO DIFF WBC: CPT

## 2021-06-10 PROCEDURE — 80197 ASSAY OF TACROLIMUS: CPT

## 2021-06-10 PROCEDURE — 80053 COMPREHEN METABOLIC PANEL: CPT

## 2021-06-10 PROCEDURE — 99605 MTMS BY PHARM NP 15 MIN: CPT | Performed by: PHARMACIST

## 2021-06-10 PROCEDURE — 99607 MTMS BY PHARM ADDL 15 MIN: CPT | Performed by: PHARMACIST

## 2021-06-10 PROCEDURE — 84100 ASSAY OF PHOSPHORUS: CPT

## 2021-06-10 RX ORDER — MELATONIN 10 MG
1 CAPSULE ORAL
COMMUNITY

## 2021-06-10 RX ORDER — CYCLOBENZAPRINE HCL 5 MG
5 TABLET ORAL 3 TIMES DAILY PRN
COMMUNITY
End: 2021-07-26

## 2021-06-10 RX ORDER — POLYETHYLENE GLYCOL 3350 17 G/17G
1 POWDER, FOR SOLUTION ORAL DAILY PRN
COMMUNITY
End: 2021-08-24

## 2021-06-10 NOTE — PATIENT INSTRUCTIONS
Recommendations from today's MTM visit:                                                       1. [facility] mail order will call you three weeks post discharge. If you are running low on any medications before then, call the number on the back of the pill box. ([phone])    2. 1. Let me know if your blood sugars go less than 100 or over 200 consistently.    Follow-up: 6/25 at 3 PM. Have your blood sugar monitor ready for review.    It was great to speak with you today.  I value your experience and would be very thankful for your time with providing feedback on our clinic survey. You may receive a survey via email or text message in the next few days.     To schedule another MTM appointment, please call the clinic directly or you may call the MTM scheduling line at [phone] or toll-free at [phone].     My Clinical Pharmacist's contact information:                                                      Please feel free to contact me with any questions or concerns you have.      Akshat Parkinson, [place]  MTM Pharmacist    Phone: [phone]

## 2021-06-10 NOTE — PROGRESS NOTES
Medication Therapy Management (MTM) Encounter    ASSESSMENT:                            Medication Adherence/Access: No issues identified    Heart Transplant:  Stable.    Type 2 Diabetes:  BG mostly <200 with prednisone taper. Will follow up in 3 weeks after next taper step. Patient to continue to monitor.     Shoulder Pain: Pt is taking Tramadol 50mg at bedtime, Amitriptyline 37.5mg at bedtime, Gabapentin 300mg twice daily and 600mg at bedtime, has not needed Acetaminophen or Cyclobenzaprine but has them. Been taking Tramadol for prior to transplant pain in his shoulders.     Constipation: Stable.      GERD: Stable.     PLAN:                            1. Let me know if your blood sugars go less than 100 or over 200.     Follow-up: No follow-ups on file.      SUBJECTIVE/OBJECTIVE:                          Jim Willingham is a 66 year old male called for a transitions of care visit. He was discharged from [facility] on 5/31 for heart txp.      Reason for visit: initial post txp med review.    Allergies/ADRs: Reviewed in chart  Tobacco: He reports that he quit smoking about 26 years ago. He has never used smokeless tobacco.  Alcohol: not currently using  Past Medical History: Reviewed in chart    Medication Adherence/Access: Patient uses pill box(es) and has assistance with medication administration: family member, Cate helps due to tremors.  Patient takes medications 4 time(s) per day. 7-9 AM, 12-2pm, 8pm, bedtime. Using alarms.  Per patient, misses medication 0 times per week.   Medication barriers: none.   The patient fills medications at [place]: YES.     Heart Transplant:  Current immunosuppressants include Tacrolimus 5mg qAM & 4mg qPM, Prednisone 20mg qAM & 5mg qPM and Mycophenolate Mofetil 1000mg twice daily.  Pt reports moderate Tremors, unable to write. Can't dial a phone quickly.  Other meds: Bumex 2mg every morning. 192-195#  Transplant date: 5/6/21  Estimated Creatinine Clearance: 41.9 mL/min (A) (based on SCr  of 1.93 mg/dL (H)).  CMV prophylaxis: CrCl 40 to 59 mL/minute: Valcyte 450 mg once daily treat for 3 months   PCP prophylaxis: Bactrim sliding scale every other day for 6 months post txp  Antifungal Prophylaxis: Nystatin QID until off steroids.   Supplements: Potassium 20mEq twice daily,  .  Tx Coordinator: Yolette Rueda, Using Med Card: Yes  Immunizations: annual flu shot ; [id number]:  unknown; Prevnar 13: ; TDaP:  ; Shingrix: unknown, HBV: unknown    Type 2 Diabetes:  Currently taking Lantus 20 units daily, Novolog sliding scale and carb counting  Blood sugar monitorin time(s) daily. Ranges (patient reported): 130-210, majority <200 per patient.   Symptoms of low blood sugar? Stomach ache when he goes low, but no lows since being home  Symptoms of high blood sugar? None  Urine Albumin: No results found for: UMALCR   Lab Results   Component Value Date    A1C 5.1 2021    A1C 6.2 2021    A1C 5.7 2020    A1C 7.5 2020    A1C 7.0 2020       Shoulder Pain: Pt is taking Tramadol 50mg at bedtime, Amitriptyline 37.5mg at bedtime, Gabapentin 300mg twice daily and 600mg at bedtime, has not needed Acetaminophen or Cyclobenzaprine but has them. Been taking Tramadol for prior to transplant pain in his shoulders.     Constipation: Pt is taking Miralax every other day, not using Senna. Having normal daily  BMs.     GERD: Current medications include: Protonix (pantoprazole) 40mg twice daily. Pt reports heartburn, for which he takes Tums.  Patient feels that current regimen is effective.    Today's Vitals: There were no vitals taken for this visit.  ----------------  Post Discharge Medication Reconciliation Status: discharge medications reconciled, continue medications without change.    I spent 30 minutes with this patient today. I offer these suggestions for consideration by txp team. A copy of the visit note was provided to the patient's referring provider.    The patient was sent  via [employer] a summary of these recommendations.     Akshat Parkinson, PharmD  MTM Pharmacist    Phone: [phone]     Telemedicine Visit Details  Type of service:  Telephone visit  Start Time: 1:10 PM  End Time: 1:40 PM  Originating Location (patient location): Home  Distant Location (provider location):  [facility] SPECIALTY MTM      Medication Therapy Recommendations  No medication therapy recommendations to display

## 2021-06-10 NOTE — TELEPHONE ENCOUNTER
RN triage   Call from [facility] occupational therapist --   Requesting orders as soon as possible today -- for 1x/week for 4 weeks -- for cardiac rehab   Please reply as soon as possible   May leave detailed message on her phone number/ secure line     Stephenie Díaz RN  [facility]  Triage Nurse Advisor        Additional Information    Nursing judgment    Protocols used: NO PROTOCOL AVAILABLE - INFORMATION ONLY-A-OH

## 2021-06-10 NOTE — TELEPHONE ENCOUNTER
Routing message below to provider to review and advise     Nohemi Schaefer RN, BSN, CMSRN  [facility]

## 2021-06-12 DIAGNOSIS — Z11.59 ENCOUNTER FOR SCREENING FOR OTHER VIRAL DISEASES: ICD-10-CM

## 2021-06-12 LAB
SARS-COV-2 RNA RESP QL NAA+PROBE: NORMAL
SPECIMEN SOURCE: NORMAL

## 2021-06-12 PROCEDURE — U0005 INFEC AGEN DETEC AMPLI PROBE: HCPCS | Performed by: INTERNAL MEDICINE

## 2021-06-12 PROCEDURE — U0003 INFECTIOUS AGENT DETECTION BY NUCLEIC ACID (DNA OR RNA); SEVERE ACUTE RESPIRATORY SYNDROME CORONAVIRUS 2 (SARS-COV-2) (CORONAVIRUS DISEASE [COVID-19]), AMPLIFIED PROBE TECHNIQUE, MAKING USE OF HIGH THROUGHPUT TECHNOLOGIES AS DESCRIBED BY CMS-2020-01-R: HCPCS | Performed by: INTERNAL MEDICINE

## 2021-06-13 DIAGNOSIS — Z11.59 ENCOUNTER FOR SCREENING FOR OTHER VIRAL DISEASES: ICD-10-CM

## 2021-06-13 LAB
LABORATORY COMMENT REPORT: NORMAL
SARS-COV-2 RNA RESP QL NAA+PROBE: NEGATIVE
SPECIMEN SOURCE: NORMAL

## 2021-06-14 ENCOUNTER — TELEPHONE (OUTPATIENT)
Dept: FAMILY MEDICINE | Facility: CLINIC | Age: 66
End: 2021-06-14

## 2021-06-15 ENCOUNTER — TELEPHONE (OUTPATIENT)
Dept: TRANSPLANT | Facility: CLINIC | Age: 66
End: 2021-06-15

## 2021-06-15 DIAGNOSIS — Z94.1 S/P ORTHOTOPIC HEART TRANSPLANT (H): ICD-10-CM

## 2021-06-15 RX ORDER — NYSTATIN 100000/ML
1000000 SUSPENSION, ORAL (FINAL DOSE FORM) ORAL 4 TIMES DAILY
Qty: 1200 ML | Refills: 3 | Status: SHIPPED | OUTPATIENT
Start: 2021-06-15 | End: 2021-08-05 | Stop reason: ALTCHOICE

## 2021-06-15 NOTE — TELEPHONE ENCOUNTER
Form faxed to [phone] and placed in scan bin to be scanned into chart.      Akilah BOYCE)(ROBIN)

## 2021-06-15 NOTE — TELEPHONE ENCOUNTER
Call returned to patient. Pt is almost out of nystatin. Medication refill sent. Speciality pharmacy contacted to see if they could get it to patient by Thursday.

## 2021-06-17 ENCOUNTER — TELEPHONE (OUTPATIENT)
Dept: FAMILY MEDICINE | Facility: CLINIC | Age: 66
End: 2021-06-17

## 2021-06-17 ENCOUNTER — MEDICAL CORRESPONDENCE (OUTPATIENT)
Dept: HEALTH INFORMATION MANAGEMENT | Facility: CLINIC | Age: 66
End: 2021-06-17

## 2021-06-17 DIAGNOSIS — Z11.59 ENCOUNTER FOR SCREENING FOR OTHER VIRAL DISEASES: ICD-10-CM

## 2021-06-17 LAB
ALBUMIN SERPL-MCNC: 3.1 G/DL (ref 3.4–5)
ALP SERPL-CCNC: 168 U/L (ref 40–150)
ALT SERPL W P-5'-P-CCNC: 29 U/L (ref 0–70)
ANION GAP SERPL CALCULATED.3IONS-SCNC: 6 MMOL/L (ref 3–14)
AST SERPL W P-5'-P-CCNC: 9 U/L (ref 0–45)
BASOPHILS # BLD AUTO: 0.3 10E9/L (ref 0–0.2)
BASOPHILS NFR BLD AUTO: 2 %
BILIRUB SERPL-MCNC: 0.4 MG/DL (ref 0.2–1.3)
BLASTS # BLD: 0.6 10E9/L
BLASTS BLD QL SMEAR: 4 %
BUN SERPL-MCNC: 46 MG/DL (ref 7–30)
CALCIUM SERPL-MCNC: 8.4 MG/DL (ref 8.5–10.1)
CHLORIDE SERPL-SCNC: 103 MMOL/L (ref 94–109)
CO2 SERPL-SCNC: 28 MMOL/L (ref 20–32)
CREAT SERPL-MCNC: 2.37 MG/DL (ref 0.66–1.25)
DIFFERENTIAL METHOD BLD: ABNORMAL
EOSINOPHIL # BLD AUTO: 0.3 10E9/L (ref 0–0.7)
EOSINOPHIL NFR BLD AUTO: 2 %
ERYTHROCYTE [DISTWIDTH] IN BLOOD BY AUTOMATED COUNT: 15.8 % (ref 10–15)
GFR SERPL CREATININE-BSD FRML MDRD: 28 ML/MIN/{1.73_M2}
GLUCOSE SERPL-MCNC: 307 MG/DL (ref 70–99)
HCT VFR BLD AUTO: 33.5 % (ref 40–53)
HGB BLD-MCNC: 10.5 G/DL (ref 13.3–17.7)
LABORATORY COMMENT REPORT: NORMAL
LYMPHOCYTES # BLD AUTO: 2.1 10E9/L (ref 0.8–5.3)
LYMPHOCYTES NFR BLD AUTO: 15 %
MAGNESIUM SERPL-MCNC: 1.8 MG/DL (ref 1.6–2.3)
MCH RBC QN AUTO: 29.7 PG (ref 26.5–33)
MCHC RBC AUTO-ENTMCNC: 31.3 G/DL (ref 31.5–36.5)
MCV RBC AUTO: 95 FL (ref 78–100)
MONOCYTES # BLD AUTO: 0.6 10E9/L (ref 0–1.3)
MONOCYTES NFR BLD AUTO: 4 %
MYELOCYTES # BLD: 0.4 10E9/L
MYELOCYTES NFR BLD MANUAL: 3 %
NEUTROPHILS # BLD AUTO: 10 10E9/L (ref 1.6–8.3)
NEUTROPHILS NFR BLD AUTO: 70 %
PHOSPHATE SERPL-MCNC: 3.5 MG/DL (ref 2.5–4.5)
PLATELET # BLD AUTO: 216 10E9/L (ref 150–450)
PLATELET # BLD EST: ABNORMAL 10*3/UL
POIKILOCYTOSIS BLD QL SMEAR: SLIGHT
POTASSIUM SERPL-SCNC: 4.5 MMOL/L (ref 3.4–5.3)
PROT SERPL-MCNC: 6.2 G/DL (ref 6.8–8.8)
RBC # BLD AUTO: 3.54 10E12/L (ref 4.4–5.9)
RBC MORPH BLD: ABNORMAL
SARS-COV-2 RNA RESP QL NAA+PROBE: NEGATIVE
SARS-COV-2 RNA RESP QL NAA+PROBE: NORMAL
SODIUM SERPL-SCNC: 137 MMOL/L (ref 133–144)
SPECIMEN SOURCE: NORMAL
SPECIMEN SOURCE: NORMAL
TACROLIMUS BLD-MCNC: 11.2 UG/L (ref 5–15)
TME LAST DOSE: 2100 H
WBC # BLD AUTO: 14.3 10E9/L (ref 4–11)

## 2021-06-17 PROCEDURE — 80197 ASSAY OF TACROLIMUS: CPT | Performed by: SURGERY

## 2021-06-17 PROCEDURE — 83735 ASSAY OF MAGNESIUM: CPT | Performed by: SURGERY

## 2021-06-17 PROCEDURE — 80053 COMPREHEN METABOLIC PANEL: CPT | Performed by: SURGERY

## 2021-06-17 PROCEDURE — U0003 INFECTIOUS AGENT DETECTION BY NUCLEIC ACID (DNA OR RNA); SEVERE ACUTE RESPIRATORY SYNDROME CORONAVIRUS 2 (SARS-COV-2) (CORONAVIRUS DISEASE [COVID-19]), AMPLIFIED PROBE TECHNIQUE, MAKING USE OF HIGH THROUGHPUT TECHNOLOGIES AS DESCRIBED BY CMS-2020-01-R: HCPCS | Performed by: SPECIALIST

## 2021-06-17 PROCEDURE — 84100 ASSAY OF PHOSPHORUS: CPT | Performed by: SURGERY

## 2021-06-17 PROCEDURE — 85025 COMPLETE CBC W/AUTO DIFF WBC: CPT | Performed by: SURGERY

## 2021-06-17 PROCEDURE — U0005 INFEC AGEN DETEC AMPLI PROBE: HCPCS | Performed by: SPECIALIST

## 2021-06-17 NOTE — TELEPHONE ENCOUNTER
Forms from [facility]- OT placed on Dr. Gómez's desk for completion.    April Worley

## 2021-06-18 ENCOUNTER — TELEPHONE (OUTPATIENT)
Dept: TRANSPLANT | Facility: CLINIC | Age: 66
End: 2021-06-18

## 2021-06-18 ENCOUNTER — TELEPHONE (OUTPATIENT)
Dept: CARDIOLOGY | Facility: CLINIC | Age: 66
End: 2021-06-18

## 2021-06-18 DIAGNOSIS — Z94.1 TRANSPLANTED HEART (H): Primary | ICD-10-CM

## 2021-06-18 RX ORDER — LIDOCAINE 40 MG/G
CREAM TOPICAL
Status: CANCELLED | OUTPATIENT
Start: 2021-06-18

## 2021-06-18 NOTE — TELEPHONE ENCOUNTER
Drainage from nose, light green. Mild headaches. Hx: sinus infections.     Low appetite.     Tac level 11.2. Goal 10-12. Confirmed 12 hour trough. 4 mg bid. No changes.     Elevated cr 2.3.     No fluid rentention    Peeing normally. Light yellow.     Weight 190.6. (baseline 192-194).     bumex 2 mg once a day.     After reviewing the above with Dr. Montgomery on 6/18, bumex decreased to 1 mg daily.

## 2021-06-18 NOTE — TELEPHONE ENCOUNTER
Call complete for pre procedure reminder, travel screen and updated visitor policy.  COVID Negative  Sharmila Bermudez RN

## 2021-06-21 ENCOUNTER — TELEPHONE (OUTPATIENT)
Dept: TRANSPLANT | Facility: CLINIC | Age: 66
End: 2021-06-21

## 2021-06-21 ENCOUNTER — APPOINTMENT (OUTPATIENT)
Dept: LAB | Facility: CLINIC | Age: 66
End: 2021-06-21
Attending: INTERNAL MEDICINE
Payer: COMMERCIAL

## 2021-06-21 ENCOUNTER — APPOINTMENT (OUTPATIENT)
Dept: MEDSURG UNIT | Facility: CLINIC | Age: 66
End: 2021-06-21
Attending: INTERNAL MEDICINE
Payer: COMMERCIAL

## 2021-06-21 ENCOUNTER — HOSPITAL ENCOUNTER (OUTPATIENT)
Facility: CLINIC | Age: 66
Discharge: HOME | End: 2021-06-21
Attending: INTERNAL MEDICINE | Admitting: INTERNAL MEDICINE
Payer: COMMERCIAL

## 2021-06-21 ENCOUNTER — HOSPITAL ENCOUNTER (OUTPATIENT)
Dept: GENERAL RADIOLOGY | Facility: CLINIC | Age: 66
End: 2021-06-21
Attending: INTERNAL MEDICINE
Payer: COMMERCIAL

## 2021-06-21 VITALS
DIASTOLIC BLOOD PRESSURE: 88 MMHG | OXYGEN SATURATION: 98 % | HEIGHT: 68 IN | BODY MASS INDEX: 29.13 KG/M2 | RESPIRATION RATE: 18 BRPM | HEART RATE: 55 BPM | TEMPERATURE: 97.9 F | SYSTOLIC BLOOD PRESSURE: 116 MMHG | WEIGHT: 192.2 LBS

## 2021-06-21 DIAGNOSIS — Z94.1 TRANSPLANTED HEART (H): Primary | ICD-10-CM

## 2021-06-21 DIAGNOSIS — Z94.1 TRANSPLANTED HEART (H): ICD-10-CM

## 2021-06-21 LAB
ALBUMIN SERPL-MCNC: 3.2 G/DL (ref 3.4–5)
ALP SERPL-CCNC: 200 U/L (ref 40–150)
ALT SERPL W P-5'-P-CCNC: 39 U/L (ref 0–70)
ANION GAP SERPL CALCULATED.3IONS-SCNC: 8 MMOL/L (ref 3–14)
AST SERPL W P-5'-P-CCNC: 21 U/L (ref 0–45)
BASOPHILS # BLD AUTO: 0 10E9/L (ref 0–0.2)
BASOPHILS NFR BLD AUTO: 0 %
BILIRUB SERPL-MCNC: 0.4 MG/DL (ref 0.2–1.3)
BUN SERPL-MCNC: 46 MG/DL (ref 7–30)
CALCIUM SERPL-MCNC: 8.6 MG/DL (ref 8.5–10.1)
CHLORIDE SERPL-SCNC: 97 MMOL/L (ref 94–109)
CK SERPL-CCNC: 31 U/L (ref 30–300)
CO2 SERPL-SCNC: 26 MMOL/L (ref 20–32)
CREAT SERPL-MCNC: 2.31 MG/DL (ref 0.66–1.25)
DIFFERENTIAL METHOD BLD: ABNORMAL
EOSINOPHIL # BLD AUTO: 0 10E9/L (ref 0–0.7)
EOSINOPHIL NFR BLD AUTO: 0 %
ERYTHROCYTE [DISTWIDTH] IN BLOOD BY AUTOMATED COUNT: 15.2 % (ref 10–15)
GFR SERPL CREATININE-BSD FRML MDRD: 29 ML/MIN/{1.73_M2}
GLUCOSE SERPL-MCNC: 285 MG/DL (ref 70–99)
HCT VFR BLD AUTO: 34.1 % (ref 40–53)
HGB BLD-MCNC: 10.2 G/DL (ref 13.3–17.7)
HGB BLD-MCNC: 10.7 G/DL (ref 13.3–17.7)
LYMPHOCYTES # BLD AUTO: 0.3 10E9/L (ref 0.8–5.3)
LYMPHOCYTES NFR BLD AUTO: 3.6 %
MAGNESIUM SERPL-MCNC: 1.9 MG/DL (ref 1.6–2.3)
MCH RBC QN AUTO: 29.5 PG (ref 26.5–33)
MCHC RBC AUTO-ENTMCNC: 31.4 G/DL (ref 31.5–36.5)
MCV RBC AUTO: 94 FL (ref 78–100)
METAMYELOCYTES # BLD: 0.1 10E9/L
METAMYELOCYTES NFR BLD MANUAL: 0.9 %
MONOCYTES # BLD AUTO: 0.4 10E9/L (ref 0–1.3)
MONOCYTES NFR BLD AUTO: 4.5 %
NEUTROPHILS # BLD AUTO: 8.5 10E9/L (ref 1.6–8.3)
NEUTROPHILS NFR BLD AUTO: 91 %
OXYHGB MFR BLDV: 64 %
PHOSPHATE SERPL-MCNC: 3.7 MG/DL (ref 2.5–4.5)
PLATELET # BLD AUTO: 163 10E9/L (ref 150–450)
PLATELET # BLD EST: ABNORMAL 10*3/UL
POIKILOCYTOSIS BLD QL SMEAR: SLIGHT
POLYCHROMASIA BLD QL SMEAR: SLIGHT
POTASSIUM SERPL-SCNC: 4.8 MMOL/L (ref 3.4–5.3)
PROT SERPL-MCNC: 6.1 G/DL (ref 6.8–8.8)
RBC # BLD AUTO: 3.63 10E12/L (ref 4.4–5.9)
SODIUM SERPL-SCNC: 131 MMOL/L (ref 133–144)
TACROLIMUS BLD-MCNC: NORMAL UG/L (ref 5–15)
TME LAST DOSE: NORMAL H
WBC # BLD AUTO: 9.3 10E9/L (ref 4–11)

## 2021-06-21 PROCEDURE — 83735 ASSAY OF MAGNESIUM: CPT | Performed by: INTERNAL MEDICINE

## 2021-06-21 PROCEDURE — 999N000132 HC STATISTIC PP CARE STAGE 1

## 2021-06-21 PROCEDURE — 80053 COMPREHEN METABOLIC PANEL: CPT | Performed by: INTERNAL MEDICINE

## 2021-06-21 PROCEDURE — 36415 COLL VENOUS BLD VENIPUNCTURE: CPT | Performed by: INTERNAL MEDICINE

## 2021-06-21 PROCEDURE — 99213 OFFICE O/P EST LOW 20 MIN: CPT | Mod: 25 | Performed by: NURSE PRACTITIONER

## 2021-06-21 PROCEDURE — 88307 TISSUE EXAM BY PATHOLOGIST: CPT | Mod: 26 | Performed by: PATHOLOGY

## 2021-06-21 PROCEDURE — 250N000009 HC RX 250: Performed by: INTERNAL MEDICINE

## 2021-06-21 PROCEDURE — 71046 X-RAY EXAM CHEST 2 VIEWS: CPT

## 2021-06-21 PROCEDURE — 88307 TISSUE EXAM BY PATHOLOGIST: CPT | Mod: TC | Performed by: INTERNAL MEDICINE

## 2021-06-21 PROCEDURE — 93505 ENDOMYOCARDIAL BIOPSY: CPT | Performed by: INTERNAL MEDICINE

## 2021-06-21 PROCEDURE — 80197 ASSAY OF TACROLIMUS: CPT | Performed by: INTERNAL MEDICINE

## 2021-06-21 PROCEDURE — 88346 IMFLUOR 1ST 1ANTB STAIN PX: CPT | Mod: TC | Performed by: INTERNAL MEDICINE

## 2021-06-21 PROCEDURE — 272N000001 HC OR GENERAL SUPPLY STERILE: Performed by: INTERNAL MEDICINE

## 2021-06-21 PROCEDURE — 85018 HEMOGLOBIN: CPT

## 2021-06-21 PROCEDURE — 88350 IMFLUOR EA ADDL 1ANTB STN PX: CPT | Mod: TC | Performed by: INTERNAL MEDICINE

## 2021-06-21 PROCEDURE — 71046 X-RAY EXAM CHEST 2 VIEWS: CPT | Mod: 26 | Performed by: RADIOLOGY

## 2021-06-21 PROCEDURE — 82550 ASSAY OF CK (CPK): CPT | Performed by: INTERNAL MEDICINE

## 2021-06-21 PROCEDURE — 88346 IMFLUOR 1ST 1ANTB STAIN PX: CPT | Mod: 26 | Performed by: PATHOLOGY

## 2021-06-21 PROCEDURE — 82810 BLOOD GASES O2 SAT ONLY: CPT

## 2021-06-21 PROCEDURE — 85025 COMPLETE CBC W/AUTO DIFF WBC: CPT | Performed by: INTERNAL MEDICINE

## 2021-06-21 PROCEDURE — 88350 IMFLUOR EA ADDL 1ANTB STN PX: CPT | Mod: 26 | Performed by: PATHOLOGY

## 2021-06-21 PROCEDURE — 84100 ASSAY OF PHOSPHORUS: CPT | Performed by: INTERNAL MEDICINE

## 2021-06-21 RX ORDER — LIDOCAINE 40 MG/G
CREAM TOPICAL
Status: COMPLETED | OUTPATIENT
Start: 2021-06-21 | End: 2021-06-21

## 2021-06-21 RX ADMIN — LIDOCAINE: 40 CREAM TOPICAL at 10:04

## 2021-06-21 ASSESSMENT — MIFFLIN-ST. JEOR: SCORE: 1636.31

## 2021-06-21 NOTE — IP AVS SNAPSHOT
[facility] [address] [address]                                    After Visit Summary   6/21/2021    Jim Willingham    MRN: [id number]           After Visit Summary Signature Page    I have received my discharge instructions, and my questions have been answered. I have discussed any challenges I see with this plan with the nurse or doctor.    ..........................................................................................................................................  Patient/Patient Representative Signature      ..........................................................................................................................................  Patient Representative Print Name and Relationship to Patient    ..................................................               ................................................  Date                                   Time    ..........................................................................................................................................  Reviewed by Signature/Title    ...................................................              ..............................................  Date                                               Time          22EPIC Rev 08/18

## 2021-06-21 NOTE — PROGRESS NOTES
Pt on 2A with RN post RHC, pt awake and alert, denies pain. Site at R Neck is flat, dry and intact. Pt taking clears without problem. Transplant AFSANEH notified pt is back; pt will discharge after seen by AFSANEH. Pt clarified he has his discharge paperwork. Denies questions.

## 2021-06-21 NOTE — PROGRESS NOTES
ADULT HEART TRANSPLANT CLINIC    HPI:   Jim Willingham is a 66 year old male with history of NICM EF 20% c/b VT s/p CRT-D s/p HMII LVAD 6/19/2017 with subsequent OHT 5/6/21. His postoperative course was complicated by right pleural air leak with prolonged chest tube and ileus requiring NG tube placement. He presents to 2A for routine testing today.     He notes occasional dizziness with standing promptly but this is decreasing in frequency. He denies fever, chills, oral lesions, lightheadedness, dizziness, chest pain, palpitations, SOB, RESENDIZ, PND, orthopnea, nausea, vomiting, diarrhea, or LE edema. He notes improvement in stamina and is tolerating therapy well. He notes a stable appetite and is over all feeling good.     TRANSPLANT MEDICATIONS:  Immunosuppression: Cellcept 1500 mg po BID. Prednisone 20 mg in AM. Tac 5 mg in AM and 4 mg in the evening, goal 10-12  Prophylaxis: Bactrim, Nystatin and Valcyte     LAST BIOPSY: pending today   LAST ANGIOGRAM: deferred in setting of elevated Cr  Serostatus: CMV: D+/R+, EBV: D+/R+, Toxo D-/R-  Intolerance to medications: None   Rejection history: None     PAST MEDICAL HISTORY:  Past Medical History:   Diagnosis Date     Bariatric surgery status 2003     Benign essential hypertension 05/11/2017     Bilateral carpal tunnel syndrome 11/02/2020     Cardiomyopathy, unspecified (H) 05/08/2017     CKD (chronic kidney disease) stage 3, GFR 30-59 ml/min 05/11/2017     COPD (chronic obstructive pulmonary disease) (H) 11/02/2020     Depression 05/11/2017     Diabetes mellitus (H) 1995     Gouty arthropathy, chronic, without tophi 11/02/2020     H/O gastric bypass 05/11/2017     ICD (implantable cardioverter-defibrillator), biventricular, in situ 05/11/2017     LVAD (left ventricular assist device) present (H)      Major depression, recurrent, chronic (H) 11/02/2020     NICM (nonischemic cardiomyopathy) (H)/ EF 20% 05/11/2017    ECHO: LVEDd. 7.66 cm, Restrictive pattern , Severe mitral  valve regurgitation     CECILIA (obstructive sleep apnea) 2017     Paroxysmal atrial fibrillation (H) 2017     Paroxysmal VT (H) 2017     Rotator cuff tear arthropathy of both shoulders 2020     Uncomplicated asthma      Vitamin B12 deficiency (non anemic) 2017       FAMILY HISTORY:  Family History   Problem Relation Age of Onset     Cerebrovascular Disease Mother 64     Diabetes Mother      Hypertension Mother      Coronary Artery Disease Father      Diabetes Type 2  Father      Obesity Brother      Obesity Brother      Cerebrovascular Disease Daughter 40       SOCIAL HISTORY:  Social History     Socioeconomic History     Marital status:      Spouse name: None     Number of children: None     Years of education: None     Highest education level: None   Occupational History     None   Social Needs     Financial resource strain: None     Food insecurity     Worry: None     Inability: None     Transportation needs     Medical: None     Non-medical: None   Tobacco Use     Smoking status: Former Smoker     Quit date:      Years since quittin.4     Smokeless tobacco: Never Used   Substance and Sexual Activity     Alcohol use: No     Drug use: No     Sexual activity: Yes     Partners: Female   Lifestyle     Physical activity     Days per week: None     Minutes per session: None     Stress: None   Relationships     Social connections     Talks on phone: None     Gets together: None     Attends Taoism service: None     Active member of club or organization: None     Attends meetings of clubs or organizations: None     Relationship status: None     Intimate partner violence     Fear of current or ex partner: None     Emotionally abused: None     Physically abused: None     Forced sexual activity: None   Other Topics Concern     Parent/sibling w/ CABG, MI or angioplasty before 65F 55M? No   Social History Narrative     None       CURRENT MEDICATIONS:  acetaminophen (TYLENOL) 325 MG  tablet      albuterol (PROAIR HFA) 108 (90 Base) MCG/ACT inhaler     allopurinol (ZYLOPRIM) 300 MG tablet     amitriptyline (ELAVIL) 25 MG tablet     aspirin (ASA) 81 MG chewable tablet     buPROPion (WELLBUTRIN) 100 MG tablet     buPROPion (WELLBUTRIN) 75 MG tablet     calcium citrate-vitamin D (CITRACAL) 315-250 MG-UNIT TABS per tablet     Fluticasone-Umeclidin-Vilanterol (TRELEGY ELLIPTA) 100-62.5-25 MCG/INH oral inhaler     gabapentin (NEURONTIN) 300 MG capsule     gabapentin (NEURONTIN) 300 MG capsule     insulin aspart (NOVOLOG PEN) 100 UNIT/ML pen     insulin aspart (NOVOLOG PEN) 100 UNIT/ML pen     insulin glargine (LANTUS PEN) 100 UNIT/ML pen     montelukast (SINGULAIR) 10 MG tablet     mycophenolate (GENERIC EQUIVALENT) 250 MG capsule     nystatin (MYCOSTATIN) 552966 UNIT/ML suspension     pantoprazole (PROTONIX) 40 MG EC tablet     potassium chloride ER (KLOR-CON M) 20 MEQ CR tablet     predniSONE (DELTASONE) 10 MG tablet     rosuvastatin (CRESTOR) 10 MG tablet     senna-docusate (SENOKOT-S/PERICOLACE) 8.6-50 MG tablet     sulfamethoxazole-trimethoprim (BACTRIM) 400-80 MG tablet     tacrolimus (GENERIC EQUIVALENT) 1 MG capsule     traMADol (ULTRAM) 50 MG tablet     valGANciclovir (VALCYTE) 450 MG tablet     ACE/ARB/ARNI NOT PRESCRIBED (INTENTIONAL)     blood glucose monitoring (ONE TOUCH ULTRA 2) meter device kit     blood glucose VI test strip     bumetanide (BUMEX) 1 MG tablet     cyanocobalamin (VITAMIN B12) 1000 MCG/ML injection     insulin pen needle (32G X 4 MM) 32G X 4 MM miscellaneous     predniSONE (DELTASONE) 20 mg po daily         ROS:   CONSTITUTIONAL: Denies fever, chills, fatigue, or weight fluctuations.   HEENT: Denies headache, vision changes, and changes in speech.   CV: Refer to HPI.   PULMONARY:Denies shortness of breath, cough, or previous TB exposure.   GI:Denies nausea, vomiting, diarrhea, and abdominal pain. Bowel movements are regular.   :Denies urinary alterations, dysuria,  "urinary frequency, hematuria, and abnormal drainage.   EXT:Denies lower extremity edema.   SKIN:Denies abnormal rashes or lesions.   MUSCULOSKELETAL:Denies upper or lower extremity weakness and pain.   NEUROLOGIC:Denies lightheadedness, dizziness, seizures, or upper or lower extremity paresthesia.     EXAM:  /89 (BP Location: Left arm)   Pulse 102   Temp 97.9  F (36.6  C) (Oral)   Resp 16   Ht 1.727 m (5' 8\")   Wt 87.2 kg (192 lb 3.2 oz)   SpO2 98%   BMI 29.22 kg/m    GENERAL: Appears alert and oriented times three.   HEENT: Eye symmetrical and free of discharge bilaterally. Mucous membranes moist and without lesions.  NECK: Supple and without lymphadenopathy. JVD below clavicular line upright.   CV: RRR, S1S2 present without murmur, rub, or gallop.   RESPIRATORY: Respirations regular, even, and unlabored. Lungs CTA throughout.   GI: Soft and non distended with normoactive bowel sounds present in all quadrants. No tenderness, rebound, guarding. No organomegaly.   EXTREMITIES: No peripheral edema. 2+ bilateral pedal pulses.   NEUROLOGIC: Alert and orientated x 3. CN II-XII grossly intact. No focal deficits.   MUSCULOSKELETAL: No joint swelling or tenderness.   SKIN: No jaundice. No rashes or lesions.     Labs:  CBC RESULTS:  Lab Results   Component Value Date    WBC 9.3 06/21/2021    RBC 3.63 (L) 06/21/2021    HGB 10.2 (L) 06/21/2021    HCT 34.1 (L) 06/21/2021    MCV 94 06/21/2021    MCH 29.5 06/21/2021    MCHC 31.4 (L) 06/21/2021    RDW 15.2 (H) 06/21/2021     06/21/2021       CMP RESULTS:  Lab Results   Component Value Date     (L) 06/21/2021    POTASSIUM 4.8 06/21/2021    CHLORIDE 97 06/21/2021    CO2 26 06/21/2021    ANIONGAP 8 06/21/2021     (H) 06/21/2021    BUN 46 (H) 06/21/2021    CR 2.31 (H) 06/21/2021    GFRESTIMATED 29 (L) 06/21/2021    GFRESTBLACK 33 (L) 06/21/2021    QAMAR 8.6 06/21/2021    BILITOTAL 0.4 06/21/2021    ALBUMIN 3.2 (L) 06/21/2021    ALKPHOS 200 (H) 06/21/2021 "    ALT 39 06/21/2021    AST 21 06/21/2021        INR RESULTS:  Lab Results   Component Value Date    INR 1.19 (H) 05/12/2021       LIPID RESULTS:  Lab Results   Component Value Date    CHOL 209 (H) 11/05/2020    HDL 87 11/05/2020     (H) 11/05/2020    TRIG 91 11/05/2020       IMMUNOSUPPRESSANT LEVELS  Lab Results   Component Value Date    TACROL 11.2 06/17/2021    DOSTAC 2,100 06/17/2021       No components found for: CK  Lab Results   Component Value Date    MAG 1.9 06/21/2021     Lab Results   Component Value Date    A1C 5.1 05/04/2021     Lab Results   Component Value Date    PHOS 3.7 06/21/2021     Lab Results   Component Value Date    NTBNP 866 (H) 07/31/2019     Lab Results   Component Value Date    SAITESTMET [facility] [place] 06/07/2021    SAICELL Class I 06/07/2021    [id number] Cw:12 06/07/2021    [id number] None 06/07/2021    SAIREPCOM  06/07/2021      Test performed by modified procedure. Serum heat inactivated and tested   by a modified ([place]) protocol including fetal calf serum addition.   High-risk, mfi >3,000. Mod-risk, mfi 500-3,000.       Lab Results   Component Value Date    SAIITESTME [facility] [place] 06/07/2021    SAIICELL Class II 06/07/2021    [id number] None 06/07/2021    [id number] None 06/07/2021    SAIIREPCOM  06/07/2021      Test performed by modified procedure. Serum heat inactivated and tested   by a modified ([place]) protocol including fetal calf serum addition.   High-risk, mfi >3,000. Mod-risk, mfi 500-3,000.       Lab Results   Component Value Date    CSPEC Plasma 06/07/2021       Diagnostic Studies:  Chest X-ray 6/21/21:   IMPRESSION:  1. Postsurgical changes of heart transplant with stable small right  pleural effusion.  2. Streaky bibasilar opacities, right greater than left, similar to  prior, atelectasis versus, less likely, pulmonary edema.    RHC/biopsy 6/21/21:   RA Pressures 11:46 AM   9    12    12      97        RV Pressures 11:46 AM 32        11     89        PA Pressures 11:45 AM 32     20    25        97        PCW Pressures 11:44 AM   13        96        Blood Flow Results Phase: Baseline     Time Results  Indexed Values (L/min/m2)   QP 11:32 AM 6.11 L/min    3.05      QS 11:32 AM 6.11 L/min    3.05      Blood Oximetry Phase: Baseline     Time Hb  SAT(%)  PO2  Content (mL/dL) PA Sat (%)   PA 11:32 AM  63.8 %      63.8      Art 11:32 AM  99 %     13.73       Cardiac Output Phase: Baseline     Time TDCO (L/min) TDCI (L/min/m2) Kee C.O. (L/min) Kee C.I. (L/min/m2) Kee HR (bpm)   Cardiac Output Results 11:32 AM   6.11    3.05             Assessment and Plan:   Jim Willingham is a 66 year old male with history of NICM EF 20% c/b VT s/p CRT-D s/p HMII LVAD 6/19/2017 with subsequent OHT 5/6/21. His postoperative course was complicated by right pleural air leak with prolonged chest tube and ileus requiring NG tube placement. He presents to 2A for routine testing today and is doing well.      Status post Heart Transplantation on 5/6/21 due to NICM. RHC with mild hypovolemia and normal CO/CI, and PVR.   Immunosuppression: Currently taking Cellcept 1500 mg po BID, he will decrease to 1000 mg po BID per Dr. Montgomery. Prednisone 20 mg in AM. Tac 5 mg in AM and 4 mg in the evening, goal 10-12. Level pending.  Serostatus: CMV: D+/R+, EBV: D+/R+, Toxo D-/R-  Prophylaxis: Bactrim, Nystatin and Valcyte  - Decrease Prednisone pending negative biopsy.   - Repeat Chest X-ray 6/21, unchanged. No acute symptoms, leukocytosis, or fever. Stable WBC, Neutrophils 91%. Repeat CBC with diff in 1 week.      Next Biopsy: pending today  Next Angiogram: deferred in setting of Cr   Next Angiogram with IVUS: yes  Next Stress Test: Per protocol    Dermatology evaluation: Annually   Opthalmology evaluation: Annually     WALTER on CKD Stage III.   - Repeat BMP in 1 week. Cr stable at 2.31. If remains elevated in 1 week discontinue Bumex.   - Tac level pending today.     Follow up BMP and CBC with diff in 1 week and per protocol with  RHC and biopsy 7/5/21.      Soraya Marshall, APRN CNP  6/21/2021          CC  [facility]

## 2021-06-21 NOTE — TELEPHONE ENCOUNTER
DATE:  6/21/2021   TIME OF RECEIPT FROM LAB:  3769  LAB TEST:  Tacrolimus    LAB VALUE:  39.0  RESULTS GIVEN WITH READ-BACK TO (PROVIDER):  Yolette Rueda RN   TIME LAB VALUE REPORTED TO PROVIDER:   2162

## 2021-06-21 NOTE — DISCHARGE INSTRUCTIONS
[facility]                        Interventional Cardiology  Discharge Instructions   Post Right Heart Cath      AFTER YOU GO HOME:    DO drink plenty of fluids    DO resume your regular diet and medications unless otherwise instructed by your Primary Physician    Do Not scrub the procedure site vigorously    No lotion or powder to the puncture site for 3 days    CALL YOUR PRIMARY PHYSICIAN IF: You may resume all normal activity.  Monitor neck site for bleeding, swelling, or voice changes. If you notice bleeding or swelling immediately apply pressure to the site and call number below to speak with Cardiology Fellow.  If you experience any changes in your breathing you should call your doctor immediately or come to the closest Emergency Department.  Do not drive yourself.    ADDITIONAL INSTRUCTIONS: Medications: You are to resume all home medications including anticoagulation therapy unless otherwise advised by your primary cardiologist or nurse coordinator.    Follow Up: Per your primary cardiology team    If you have any questions or concerns regarding your procedure site please call [phone] at anytime and ask for Cardiology Fellow on call.  They are available 24 hours a day.  You may also contact the Cardiology Clinic after hours number at [phone].                                                       Telephone Numbers [phone] Monday-Friday 8:00 am to 4:30 pm    [phone] After 4:30 pm Monday-Friday, Weekends & Holidays  Ask for Interventional Cardiologist on call. Someone is on call 24 hours/day   [facility] toll free number [phone] Monday-Friday 8:00 am to 4:30 pm   [facility] Emergency Dept [phone]

## 2021-06-21 NOTE — IP AVS SNAPSHOT
After Visit Summary Template Not Found    This Print Group is only intended to be used in the After Visit Summary and can only be used in a report that uses a released After Visit Summary Template.                       MRN:[id number]                      After Visit Summary   6/21/2021    Jim Willingham    MRN: [id number]           Visit Information        Department      6/21/2021  9:12 AM [facility] [address] [place]          Review of your medicines      UNREVIEWED medicines. Ask your doctor about these medicines       Dose / Directions   acetaminophen 325 MG tablet  Commonly known as: TYLENOL  Used for: S/P orthotopic heart transplant (H)      Dose: 650 mg  Take 2 tablets (650 mg) by mouth every 4 hours as needed for other (For optimal non-opioid multimodal pain management to improve pain control.)  Quantity: 100 tablet  Refills: 1     albuterol 108 (90 Base) MCG/ACT inhaler  Commonly known as: ProAir HFA  Used for: S/P orthotopic heart transplant (H)      Dose: 2 puff  Inhale 2 puffs into the lungs every 4 hours as needed for shortness of breath / dyspnea or wheezing  Quantity: 6.7 g  Refills: 1     allopurinol 300 MG tablet  Commonly known as: ZYLOPRIM  Used for: 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, unspecified laterality      Dose: 300 mg  Take 1 tablet (300 mg) by mouth daily  Quantity: 90 tablet  Refills: 1     amitriptyline 25 MG tablet  Commonly known as: ELAVIL  Used for: S/P orthotopic heart transplant (H)      Dose: 37.5 mg  Take 1.5 tablets (37.5 mg) by mouth At Bedtime  Quantity: 30 tablet  Refills: 1     aspirin 81 MG chewable tablet  Commonly known as: ASA  Used for: S/P orthotopic heart transplant (H)      Dose: 81 mg  Take 1 tablet (81 mg) by mouth daily  Quantity: 100 tablet  Refills: 1     bumetanide 1 MG tablet  Commonly known as: BUMEX  Used for: S/P orthotopic heart transplant (H)      Dose: 2 mg  Take 2 tablets (2 mg) by mouth daily  Quantity: 90  tablet  Refills: 11     * buPROPion 75 MG tablet  Commonly known as: WELLBUTRIN  Used for: S/P orthotopic heart transplant (H)      Dose: 75 mg  Take 1 tablet (75 mg) by mouth 2 times daily  Quantity: 60 tablet  Refills: 1     * buPROPion 100 MG tablet  Commonly known as: WELLBUTRIN  Used for: S/P orthotopic heart transplant (H)      Dose: 50 mg  0.5 tablets (50 mg) by Oral or Feeding Tube route every evening  Quantity: 30 tablet  Refills: 1     calcium citrate-vitamin D 315-250 MG-UNIT Tabs per tablet  Commonly known as: CITRACAL  Used for: S/P orthotopic heart transplant (H)      Dose: 1 tablet  Take 1 tablet by mouth 2 times daily  Quantity: 60 tablet  Refills: 1     cyanocobalamin 1000 MCG/ML injection  Commonly known as: CYANOCOBALAMIN      Dose: 1,000 mcg  Inject 1,000 mcg into the muscle every 30 days  Refills: 0     cyclobenzaprine 5 MG tablet  Commonly known as: FLEXERIL      Dose: 5 mg  Take 5 mg by mouth 3 times daily as needed for muscle spasms  Refills: 0     Fluticasone-Umeclidin-Vilanterol 100-62.5-25 MCG/INH oral inhaler  Commonly known as: TRELEGY ELLIPTA  Used for: Chronic obstructive pulmonary disease, unspecified COPD type (H)      Dose: 1 puff  Inhale 1 puff into the lungs daily  Quantity: 1 each  Refills: 1     * gabapentin 300 MG capsule  Commonly known as: NEURONTIN  Used for: S/P orthotopic heart transplant (H)      Dose: 300 mg  Take 1 capsule (300 mg) by mouth every morning  Quantity: 30 capsule  Refills: 1     * gabapentin 300 MG capsule  Commonly known as: NEURONTIN  Used for: S/P orthotopic heart transplant (H)      Dose: 600 mg  Take 2 capsules (600 mg) by mouth 2 times daily At 12pm and 8pm  Quantity: 60 capsule  Refills: 1     * insulin aspart 100 UNIT/ML pen  Commonly known as: NovoLOG PEN  Used for: S/P orthotopic heart transplant (H)      Dose: 1-10 Units  Inject 1-10 Units Subcutaneous 3 times daily (before meals) Correction Scale - HIGH INSULIN RESISTANCE DOSING     Do Not give  Correction Insulin if Pre-Meal BG less than 140.   For Pre-Meal  - 164 give 1 unit.   For Pre-Meal  - 189 give 2 units.   For Pre-Meal  - 214 give 3 units.   For Pre-Meal  - 239 give 4 units.   For Pre-Meal  - 264 give 5 units.   For Pre-Meal  - 289 give 6 units.   For Pre-Meal  - 314 give 7 units.   For Pre-Meal  - 339 give 8 units.   For Pre-Meal  - 364 give 9 units.   For Pre-Meal BG greater than or equal to 365 give 10 units  To be given with prandial insulin, and based on pre-meal blood glucose.   Notify provider if glucose greater than or equal to 350 mg/dL after administration of correction dose. If given at mealtime, administer within 30 minutes of start of meal  Quantity: 30 mL  Refills: 1     * insulin aspart 100 UNIT/ML pen  Commonly known as: NovoLOG PEN  Used for: S/P orthotopic heart transplant (H)      Dose: 1-7 Units  Inject 1-7 Units Subcutaneous At Bedtime HIGH INSULIN RESISTANCE DOSING    Do Not give Bedtime Correction Insulin if BG less than 200.   For  - 224 give 1 units.   For  - 249 give 2 units.   For  - 274 give 3 units.   For  - 299 give 4 units.   For  - 324 give 5 units.   For  - 349 give 6 units.   For BG greater than or equal to 350 give 7 units.   Notify provider if glucose greater than or equal to 350 mg/dL after administration of correction dose. If given at mealtime, administer within 30 minutes of start of meal  Quantity: 15 mL  Refills: 1     insulin glargine 100 UNIT/ML pen  Commonly known as: LANTUS PEN  Used for: S/P orthotopic heart transplant (H)      Dose: 20 Units  Inject 20 Units Subcutaneous every morning (before breakfast)  Quantity: 15 mL  Refills: 1     montelukast 10 MG tablet  Commonly known as: Singulair  Used for: S/P orthotopic heart transplant (H)      Dose: 10 mg  Take 1 tablet (10 mg) by mouth At Bedtime  Quantity: 90 tablet  Refills: 1     mycophenolate 250 MG  capsule  Commonly known as: GENERIC EQUIVALENT  Used for: S/P orthotopic heart transplant (H)      Dose: 1,000 mg  Take 4 capsules (1,000 mg) by mouth 2 times daily  Quantity: 360 capsule  Refills: 1     nystatin 312051 UNIT/ML suspension  Commonly known as: MYCOSTATIN  Used for: S/P orthotopic heart transplant (H)      Dose: 1,000,000 Units  Swish and swallow 10 mLs (1,000,000 Units) in mouth 4 times daily  Quantity: 1200 mL  Refills: 3     pantoprazole 40 MG EC tablet  Commonly known as: PROTONIX  Used for: S/P orthotopic heart transplant (H)      Dose: 40 mg  Take 1 tablet (40 mg) by mouth 2 times daily  Quantity: 60 tablet  Refills: 1     polyethylene glycol 17 GM/Dose powder  Commonly known as: MIRALAX      Dose: 1 capful  Take 1 capful by mouth daily as needed for constipation  Refills: 0     potassium chloride ER 20 MEQ CR tablet  Commonly known as: KLOR-CON M  Used for: Chronic systolic congestive heart failure (H)      Dose: 20 mEq  Take 1 tablet (20 mEq) by mouth 2 times daily  Quantity: 60 tablet  Refills: 1     * predniSONE 10 MG tablet  Commonly known as: DELTASONE  Used for: S/P orthotopic heart transplant (H)      Dose: 20 mg  Take 2 tablets (20 mg) by mouth every morning  Quantity: 120 tablet  Refills: 1     * predniSONE 5 MG tablet  Commonly known as: DELTASONE  Used for: S/P orthotopic heart transplant (H)      Dose: 10 mg  Take 2 tablets (10 mg) by mouth every evening  Quantity: 90 tablet  Refills: 1     rosuvastatin 10 MG tablet  Commonly known as: CRESTOR  Used for: S/P orthotopic heart transplant (H)      Dose: 10 mg  Take 1 tablet (10 mg) by mouth daily  Quantity: 90 tablet  Refills: 1     senna-docusate 8.6-50 MG tablet  Commonly known as: SENOKOT-S/PERICOLACE  Used for: S/P orthotopic heart transplant (H)      Dose: 2 tablet  Take 2 tablets by mouth daily as needed for constipation  Quantity: 180 tablet  Refills: 3     sulfamethoxazole-trimethoprim 400-80 MG tablet  Commonly known as:  BACTRIM  Indication: PCP ppx  Used for: S/P orthotopic heart transplant (H)      Dose: 1 tablet  Take 1 tablet by mouth every 48 hours  Quantity: 30 tablet  Refills: 1     tacrolimus 1 MG capsule  Commonly known as: GENERIC EQUIVALENT  Used for: S/P orthotopic heart transplant (H)      Take 5 capsules (5 mg) by mouth every morning AND 4 capsules (4 mg) every evening.  Quantity: 810 capsule  Refills: 1     traMADol 50 MG tablet  Commonly known as: ULTRAM  Used for: S/P orthotopic heart transplant (H)      Dose: 25-50 mg  Take 0.5-1 tablets (25-50 mg) by mouth every 6 hours as needed for moderate pain  Quantity: 25 tablet  Refills: 1     valGANciclovir 450 MG tablet  Commonly known as: VALCYTE  Indication: Medication Treatment to Prevent Cytomegalovirus Disease  Used for: S/P orthotopic heart transplant (H)      Dose: 450 mg  Take 1 tablet (450 mg) by mouth daily  Quantity: 90 tablet  Refills: 1         * This list has 8 medication(s) that are the same as other medications prescribed for you. Read the directions carefully, and ask your doctor or other care provider to review them with you.            CONTINUE these medicines which have NOT CHANGED       Dose / Directions   ACE/ARB/ARNI NOT PRESCRIBED  Commonly known as: INTENTIONAL      ACE & ARB not prescribed due to {CHOOSE REASON BEFORE SIGNING!!!:313933}  Refills: 0     blood glucose monitoring meter device kit  Used for: Type 2 diabetes mellitus with diabetic neuropathy, without long-term current use of insulin (H)      Use to test blood sugar four times daily or as directed.  Quantity: 1 kit  Refills: 0     blood glucose test strip  Commonly known as: NO BRAND SPECIFIED  Used for: Type 2 diabetes mellitus with diabetic neuropathy, without long-term current use of insulin (H)      Use to test blood sugar four times daily or as directed.  Quantity: 200 each  Refills: 0     insulin pen needle 32G X 4 MM miscellaneous  Commonly known as: 32G X 4 MM  Used for: Type 2  diabetes mellitus with diabetic neuropathy, without long-term current use of insulin (H)      Use four pen needles daily or as directed.  Quantity: 110 each  Refills: 0     Melatonin 10 MG Caps      Dose: 1 capsule  Take 1 capsule by mouth At Bedtime  Refills: 0              Protect others around you: Learn how to safely use, store and throw away your medicines at www.disposemymeds.org.       Follow-ups after your visit       Your next 10 appointments already scheduled    Jun 21, 2021 10:00 AM  LAB with UU LAB GOLD WAITING  [facility] ([facility] ) [address] [address]   Please do not eat 10-12 hours before your appointment if you are coming in fasting for labs on lipids, cholesterol, or glucose (sugar). Does not apply to pregnant women. Water, tea and black coffee (with nothing added) is okay. Do not drink other fluids, diet soda or gum. If you have concerns about taking your medications, please send a message by clicking on Secure Messaging, Message Your Care Team.     Jun 21, 2021 10:30 AM  Procedure - 2.5 hour with U2A ROOM 9  [facility] [address] [place] ([facility] ) [address] [address]      Jun 21, 2021  Procedure with Scout Robins MD  [facility] ([facility] ) [address] [id number] [phone]   The [facility] is located on the corner of [address] and [facility] [address] on the [facility]. It is easily accessible from virtually any point in the [facility] area, via [address].   Jun 25, 2021  3:00 PM  Telephone Visit with Francesca Odom RPH  [facility]  [address] [facility] [place] ([facility] ) [address] [address] [phone]   Please Note: This is a virtual visit; there is no need to come to the facility.       Jul 05, 2021  9:30 AM  LAB with UU LAB GOLD WAITING  [facility] ([facility] ) [address] [address]   Please do not eat 10-12 hours before your appointment if you are coming in fasting for labs on lipids, cholesterol, or glucose (sugar). Does not apply to pregnant women. Water, tea and black coffee (with nothing added) is okay. Do not drink other fluids, diet soda or gum. If you have concerns about taking your medications, please send a message by clicking on Secure Messaging, Message Your Care Team.     Jul 05, 2021 10:00 AM  Procedure - 2.5 hour with U2A ROOM 16  [facility] [address] [place] ([facility] ) [address] [address]      Jul 05, 2021  Procedure with Amadeo Art MD  [facility] ([facility] ) [address] [place] [address]  [phone]   The [facility] is located on the corner of [address] [facility] [address] and [address] [facility] on the [facility]. It is easily accessible from virtually any point in the [facility] area, via [address].   Jul 19, 2021  9:30 AM  LAB with UU LAB GOLD WAITING  [facility] ([facility] ) [address] [address]   Please do not eat 10-12 hours before your appointment if you are coming in fasting for  labs on lipids, cholesterol, or glucose (sugar). Does not apply to pregnant women. Water, tea and black coffee (with nothing added) is okay. Do not drink other fluids, diet soda or gum. If you have concerns about taking your medications, please send a message by clicking on Secure Messaging, Message Your Care Team.     Jul 19, 2021 10:00 AM  Procedure - 2.5 hour with U2A ROOM 5  [facility] [place] ([facility] ) [address] [address]      Jul 19, 2021  Procedure with Scout Robins MD  [facility] ([facility] ) [address] [address] [phone]   The [facility] is located on the corner of [address] and [facility] on the [facility]. It is easily accessible from virtually any point in the [facility] area, via [address] and [address].      Care Instructions       Further instructions from your care team       [facility]                        Interventional Cardiology  Discharge Instructions   Post Right Heart Cath      AFTER YOU GO HOME:    DO drink plenty of fluids    DO resume your regular diet and medications unless otherwise instructed by your Primary Physician    Do Not scrub the procedure site vigorously    No lotion or powder to the puncture site for 3 days    CALL YOUR PRIMARY PHYSICIAN IF: You may resume all normal activity.  Monitor neck site for bleeding, swelling, or voice changes. If you notice bleeding or swelling immediately apply pressure to the site and call number below to speak with Cardiology Fellow.  If you experience any changes in your breathing you should call your doctor immediately or come to the closest Emergency Department.  Do not  drive yourself.    ADDITIONAL INSTRUCTIONS: Medications: You are to resume all home medications including anticoagulation therapy unless otherwise advised by your primary cardiologist or nurse coordinator.    Follow Up: Per your primary cardiology team    If you have any questions or concerns regarding your procedure site please call [phone] at anytime and ask for Cardiology Fellow on call.  They are available 24 hours a day.  You may also contact the Cardiology Clinic after hours number at [phone].                                                       Telephone Numbers [phone] Monday-Friday 8:00 am to 4:30 pm    [phone] After 4:30 pm Monday-Friday, Weekends & Holidays  Ask for Interventional Cardiologist on call. Someone is on call 24 hours/day   [facility] toll free number [phone] Monday-Friday 8:00 am to 4:30 pm   [facility] Emergency Dept [phone]                   Additional Information About Your Visit       [email or web address] Information    [employer] gives you secure access to your electronic health record. If you see a primary care provider, you can also send messages to your care team and make appointments. If you have questions, please call your primary care clinic.  If you do not have a primary care provider, please call [phone] and they will assist you.       Care EveryWhere ID    This is your Care EveryWhere ID. This could be used by other organizations to access your [place] medical records  [id number]       Your Vitals Were  Most recent update: 6/21/2021  9:49 AM    Blood Pressure   122/89 (BP Location: Left arm)    Pulse   102    Temperature   97.9  F (36.6  C) (Oral)    Respirations   16    Pulse Oximetry   98%          Primary Care Provider Office Phone # Fax #    Ricardo Gómez [phone]      Equal Access to Services    JERROD VELASQUEZ : Rajeev Gastelum, gatito james, sophie larson, kendra ely.  So [facility] [phone].    ATENCIÓN: Si edouardla jose, tiene a roger disposición servicios gratuitos de asistencia lingüística. Guerline izquierdo [phone].    We comply with applicable federal and state civil rights laws, including the [place] Human Rights Act. We do not discriminate on the basis of race, color, creed, Jewish, national origin, marital status, age, disability, sex, sexual orientation, or gender identity.    If you would like an itemization of your charges they will now be available in [employer] 30 days after discharge. To access the itemized statements in [employer] go to billing/billing summary. From there select view account. There will be multiple tabs showing an overview of your account, detail, payments, and communications. From the communications tab you can see your monthly statements, your itemized statements, and any billing letters generated for your account. If you do not have a [employer] account and need help getting access please contact [employer] support at [phone].  If you would prefer to have your itemized statements mailed please contact our automated itemized bill request line at [phone] option  2.       Thank you!    Thank you for choosing [facility] for your care. Our goal is always to provide you with excellent care. Hearing back from our patients is one way we can continue to improve our services. Please take a few minutes to complete the written survey that you may receive in the mail after you visit with us. Thank you!            Medication List      Medications          Morning Afternoon Evening Bedtime As Needed    ACE/ARB/ARNI NOT PRESCRIBED  Also known as: INTENTIONAL  INSTRUCTIONS: ACE & ARB not prescribed due to {CHOOSE REASON BEFORE SIGNING!!!:614860}                     blood glucose monitoring meter device kit  INSTRUCTIONS: Use to test blood sugar four times daily or as directed.                     blood glucose test strip  Also known as: NO BRAND SPECIFIED  INSTRUCTIONS:  Use to test blood sugar four times daily or as directed.                     insulin pen needle 32G X 4 MM miscellaneous  Also known as: 32G X 4 MM  INSTRUCTIONS: Use four pen needles daily or as directed.                     Melatonin 10 MG Caps  INSTRUCTIONS: Take 1 capsule by mouth At Bedtime                       ASK your doctor about these medications          Morning Afternoon Evening Bedtime As Needed    mycophenolate 250 MG capsule  Also known as: GENERIC EQUIVALENT  INSTRUCTIONS: Take 4 capsules (1,000 mg) by mouth 2 times daily  Doctor's comments: TXP DT 5/6/2021 (Heart) TXP Dischg DT 5/31/2021 DX Heart replaced by transplant Z94.1 [facility] ([place], [place])  This medication is very important: It prevents organ rejection.                     * predniSONE 10 MG tablet  Also known as: DELTASONE  INSTRUCTIONS: Take 2 tablets (20 mg) by mouth every morning  This medication is very important: It prevents organ rejection.                     * predniSONE 5 MG tablet  Also known as: DELTASONE  INSTRUCTIONS: Take 2 tablets (10 mg) by mouth every evening  Doctor's comments: TXP DT 5/6/2021 (Heart) TXP Dischg DT 5/31/2021 DX Heart replaced by transplant Z94.1 [facility] ([place], [place])  This medication is very important: It prevents organ rejection.                     tacrolimus 1 MG capsule  Also known as: GENERIC EQUIVALENT  INSTRUCTIONS: Take 5 capsules (5 mg) by mouth every morning AND 4 capsules (4 mg) every evening.  Doctor's comments: TXP DT 5/6/2021 (Heart) TXP Dischg DT 5/31/2021 DX Heart replaced by transplant Z94.1 [facility] ([place], [place])  This medication is very important: It prevents organ rejection.                     sulfamethoxazole-trimethoprim 400-80 MG tablet  Also known as: BACTRIM  INSTRUCTIONS: Take 1 tablet by mouth every 48 hours  Reason  for med: PCP ppx  This medication is very important: It prevents dangerous infection.                     valGANciclovir 450 MG tablet  Also known as: VALCYTE  INSTRUCTIONS: Take 1 tablet (450 mg) by mouth daily  Reason for med: Medication Treatment to Prevent Cytomegalovirus Disease  This medication is very important: It prevents dangerous infection.                     acetaminophen 325 MG tablet  Also known as: TYLENOL  INSTRUCTIONS: Take 2 tablets (650 mg) by mouth every 4 hours as needed for other (For optimal non-opioid multimodal pain management to improve pain control.)                     albuterol 108 (90 Base) MCG/ACT inhaler  Also known as: ProAir HFA  INSTRUCTIONS: Inhale 2 puffs into the lungs every 4 hours as needed for shortness of breath / dyspnea or wheezing  Doctor's comments: Pharmacy may dispense brand covered by insurance (Proair, or proventil or ventolin or generic albuterol inhaler)                     allopurinol 300 MG tablet  Also known as: ZYLOPRIM  INSTRUCTIONS: Take 1 tablet (300 mg) by mouth daily                     amitriptyline 25 MG tablet  Also known as: ELAVIL  INSTRUCTIONS: Take 1.5 tablets (37.5 mg) by mouth At Bedtime                     aspirin 81 MG chewable tablet  Also known as: ASA  INSTRUCTIONS: Take 1 tablet (81 mg) by mouth daily                     bumetanide 1 MG tablet  Also known as: BUMEX  INSTRUCTIONS: Take 2 tablets (2 mg) by mouth daily                     * buPROPion 75 MG tablet  Also known as: WELLBUTRIN  INSTRUCTIONS: Take 1 tablet (75 mg) by mouth 2 times daily                     * buPROPion 100 MG tablet  Also known as: WELLBUTRIN  INSTRUCTIONS: 0.5 tablets (50 mg) by Oral or Feeding Tube route every evening                     calcium citrate-vitamin D 315-250 MG-UNIT Tabs per tablet  Also known as: CITRACAL  INSTRUCTIONS: Take 1 tablet by mouth 2 times daily                     cyanocobalamin 1000 MCG/ML injection  Also known as:  CYANOCOBALAMIN  INSTRUCTIONS: Inject 1,000 mcg into the muscle every 30 days                     cyclobenzaprine 5 MG tablet  Also known as: FLEXERIL  INSTRUCTIONS: Take 5 mg by mouth 3 times daily as needed for muscle spasms                     Fluticasone-Umeclidin-Vilanterol 100-62.5-25 MCG/INH oral inhaler  Also known as: TRELEGY ELLIPTA  INSTRUCTIONS: Inhale 1 puff into the lungs daily                     * gabapentin 300 MG capsule  Also known as: NEURONTIN  INSTRUCTIONS: Take 1 capsule (300 mg) by mouth every morning                     * gabapentin 300 MG capsule  Also known as: NEURONTIN  INSTRUCTIONS: Take 2 capsules (600 mg) by mouth 2 times daily At 12pm and 8pm                     * insulin aspart 100 UNIT/ML pen  Also known as: NovoLOG PEN  INSTRUCTIONS: Inject 1-10 Units Subcutaneous 3 times daily (before meals) Correction Scale - HIGH INSULIN RESISTANCE DOSING     Do Not give Correction Insulin if Pre-Meal BG less than 140.   For Pre-Meal  - 164 give 1 unit.   For Pre-Meal  - 189 give 2 units.   For Pre-Meal  - 214 give 3 units.   For Pre-Meal  - 239 give 4 units.   For Pre-Meal  - 264 give 5 units.   For Pre-Meal  - 289 give 6 units.   For Pre-Meal  - 314 give 7 units.   For Pre-Meal  - 339 give 8 units.   For Pre-Meal  - 364 give 9 units.   For Pre-Meal BG greater than or equal to 365 give 10 units  To be given with prandial insulin, and based on pre-meal blood glucose.   Notify provider if glucose greater than or equal to 350 mg/dL after administration of correction dose. If given at mealtime, administer within 30 minutes of start of meal                     * insulin aspart 100 UNIT/ML pen  Also known as: NovoLOG PEN  INSTRUCTIONS: Inject 1-7 Units Subcutaneous At Bedtime HIGH INSULIN RESISTANCE DOSING    Do Not give Bedtime Correction Insulin if BG less than 200.   For  - 224 give 1 units.   For  - 249 give 2 units.   For  -  274 give 3 units.   For  - 299 give 4 units.   For  - 324 give 5 units.   For  - 349 give 6 units.   For BG greater than or equal to 350 give 7 units.   Notify provider if glucose greater than or equal to 350 mg/dL after administration of correction dose. If given at mealtime, administer within 30 minutes of start of meal                     insulin glargine 100 UNIT/ML pen  Also known as: LANTUS PEN  INSTRUCTIONS: Inject 20 Units Subcutaneous every morning (before breakfast)  Doctor's comments: If Lantus is not covered by insurance, may substitute Basaglar at same dose and frequency.                       montelukast 10 MG tablet  Also known as: Singulair  INSTRUCTIONS: Take 1 tablet (10 mg) by mouth At Bedtime                     nystatin 371212 UNIT/ML suspension  Also known as: MYCOSTATIN  INSTRUCTIONS: Swish and swallow 10 mLs (1,000,000 Units) in mouth 4 times daily                     pantoprazole 40 MG EC tablet  Also known as: PROTONIX  INSTRUCTIONS: Take 1 tablet (40 mg) by mouth 2 times daily                     polyethylene glycol 17 GM/Dose powder  Also known as: MIRALAX  INSTRUCTIONS: Take 1 capful by mouth daily as needed for constipation                     potassium chloride ER 20 MEQ CR tablet  Also known as: KLOR-CON M  INSTRUCTIONS: Take 1 tablet (20 mEq) by mouth 2 times daily                     rosuvastatin 10 MG tablet  Also known as: CRESTOR  INSTRUCTIONS: Take 1 tablet (10 mg) by mouth daily                     senna-docusate 8.6-50 MG tablet  Also known as: SENOKOT-S/PERICOLACE  INSTRUCTIONS: Take 2 tablets by mouth daily as needed for constipation                     traMADol 50 MG tablet  Also known as: ULTRAM  INSTRUCTIONS: Take 0.5-1 tablets (25-50 mg) by mouth every 6 hours as needed for moderate pain                        * This list has 8 medication(s) that are the same as other medications prescribed for you. Read the directions carefully, and ask your doctor or  other care provider to review them with you.

## 2021-06-21 NOTE — PROGRESS NOTES
Arrived to [facility] for RHC/Bx procedures in [place]. AFVSS. Denies pain. Consent done. Labs processing. BG result processing. Pt stable.

## 2021-06-22 ENCOUNTER — TELEPHONE (OUTPATIENT)
Dept: FAMILY MEDICINE | Facility: CLINIC | Age: 66
End: 2021-06-22

## 2021-06-22 ENCOUNTER — TELEPHONE (OUTPATIENT)
Dept: TRANSPLANT | Facility: CLINIC | Age: 66
End: 2021-06-22

## 2021-06-22 DIAGNOSIS — Z94.1 TRANSPLANTED HEART (H): Primary | ICD-10-CM

## 2021-06-22 LAB — COPATH REPORT: NORMAL

## 2021-06-22 NOTE — TELEPHONE ENCOUNTER
Reliable medical supply form placed in Dr. Ricardo Gómez's bin for completion.      Akilah BOYCE)YAHAIRA)

## 2021-06-22 NOTE — TELEPHONE ENCOUNTER
Pt called again to discuss elevated tacro level of 36.8. no answer. VM left requesting call back to discuss.

## 2021-06-22 NOTE — TELEPHONE ENCOUNTER
Transplant Coordinator Note    Reason for visit: 6 week follow up.  Coordinator: Unable to be present. Pt seen on 2A by Soraya Marshall       [facility] concerns addressed today:  1. Cr 2.3~ 4 week angio delayed.   We decreased his bumex to 1 mg (from 2 mg) last Friday.   2. Wbc 14.3. Feels good. No s/s of infection other than some nasal drainage. Repeating cxr today because last one showed the following:   Impression: Returned to baseline today at 9.3.   1. Postsurgical changes of heart transplant with right greater than   left small bilateral pleural effusions.  Streaky bibasilar opacities, atelectasis versus pulmonary edema. repeat CXR reviewed with Soraya~no changes at this time.   3. Elevated tac of 36. Pt states he took his tac the morning prior to blood draw. Pt to recheck with home health visit on Thursday.       Immunosuppressants:  MMF 1000 mg bid (Per Dr. Montgomery pt is older, higher risk of infection/cancer than rejection)   Tac goal 10-12.   Prednisone 20/5~ will decrease if hbx neg     Rhc, resulted labs, cxr, heart biopsy reviewed with patient  Medication record reviewed and reconciled  Questions and concerns addressed  Pt verbalized an understanding of plan of care.     Patient Instructions  1. Please make sure you are taking Mycophenolate 1000 mg twice a day.   2. Decrease your bumex to 1 mg daily  3. If heart biopsy is negative, decrease prednisone to 20 mg daily.  4. Make sure you remember to prepare for a 12 hour drug level trough. Instructions reviewed.   5. Repeat labs on Thursday with home health.    Next transplant clinic appointment:  8 week on 7/19  Next lab draw: Thursday with home health.   Coordinator will call with all pending results.     Please call transplant coordinator with any questions:    Yolette Rueda RN BSN   Post Heart Transplant Nurse Coordinator  [facility]  Questions: [phone]

## 2021-06-23 DIAGNOSIS — Z11.59 ENCOUNTER FOR SCREENING FOR OTHER VIRAL DISEASES: ICD-10-CM

## 2021-06-24 ENCOUNTER — TELEPHONE (OUTPATIENT)
Dept: TRANSPLANT | Facility: CLINIC | Age: 66
End: 2021-06-24

## 2021-06-24 ENCOUNTER — MEDICAL CORRESPONDENCE (OUTPATIENT)
Dept: HEALTH INFORMATION MANAGEMENT | Facility: CLINIC | Age: 66
End: 2021-06-24

## 2021-06-24 DIAGNOSIS — Z11.59 ENCOUNTER FOR SCREENING FOR OTHER VIRAL DISEASES: ICD-10-CM

## 2021-06-24 DIAGNOSIS — Z94.1 TRANSPLANTED HEART (H): Primary | ICD-10-CM

## 2021-06-24 LAB
ALBUMIN SERPL-MCNC: 2.9 G/DL (ref 3.4–5)
ALP SERPL-CCNC: 197 U/L (ref 40–150)
ALT SERPL W P-5'-P-CCNC: 40 U/L (ref 0–70)
ANION GAP SERPL CALCULATED.3IONS-SCNC: 9 MMOL/L (ref 3–14)
AST SERPL W P-5'-P-CCNC: 31 U/L (ref 0–45)
BASOPHILS # BLD AUTO: 0 10E9/L (ref 0–0.2)
BASOPHILS NFR BLD AUTO: 0.7 %
BILIRUB SERPL-MCNC: 0.3 MG/DL (ref 0.2–1.3)
BUN SERPL-MCNC: 41 MG/DL (ref 7–30)
CALCIUM SERPL-MCNC: 8 MG/DL (ref 8.5–10.1)
CHLORIDE SERPL-SCNC: 102 MMOL/L (ref 94–109)
CO2 SERPL-SCNC: 23 MMOL/L (ref 20–32)
CREAT SERPL-MCNC: 2.23 MG/DL (ref 0.66–1.25)
DIFFERENTIAL METHOD BLD: ABNORMAL
EOSINOPHIL # BLD AUTO: 0 10E9/L (ref 0–0.7)
EOSINOPHIL NFR BLD AUTO: 0.9 %
ERYTHROCYTE [DISTWIDTH] IN BLOOD BY AUTOMATED COUNT: 15.1 % (ref 10–15)
GFR SERPL CREATININE-BSD FRML MDRD: 30 ML/MIN/{1.73_M2}
GLUCOSE SERPL-MCNC: 320 MG/DL (ref 70–99)
HCT VFR BLD AUTO: 34.1 % (ref 40–53)
HGB BLD-MCNC: 10.7 G/DL (ref 13.3–17.7)
IMM GRANULOCYTES # BLD: 0.2 10E9/L (ref 0–0.4)
IMM GRANULOCYTES NFR BLD: 4.8 %
LYMPHOCYTES # BLD AUTO: 1.4 10E9/L (ref 0.8–5.3)
LYMPHOCYTES NFR BLD AUTO: 33 %
MAGNESIUM SERPL-MCNC: 1.8 MG/DL (ref 1.6–2.3)
MCH RBC QN AUTO: 29.3 PG (ref 26.5–33)
MCHC RBC AUTO-ENTMCNC: 31.4 G/DL (ref 31.5–36.5)
MCV RBC AUTO: 93 FL (ref 78–100)
MONOCYTES # BLD AUTO: 0.4 10E9/L (ref 0–1.3)
MONOCYTES NFR BLD AUTO: 8 %
NEUTROPHILS # BLD AUTO: 2.3 10E9/L (ref 1.6–8.3)
NEUTROPHILS NFR BLD AUTO: 52.6 %
NRBC # BLD AUTO: 0 10*3/UL
NRBC BLD AUTO-RTO: 0 /100
PHOSPHATE SERPL-MCNC: 3.5 MG/DL (ref 2.5–4.5)
PLATELET # BLD AUTO: 202 10E9/L (ref 150–450)
POTASSIUM SERPL-SCNC: 4.3 MMOL/L (ref 3.4–5.3)
PROT SERPL-MCNC: 5.8 G/DL (ref 6.8–8.8)
RBC # BLD AUTO: 3.65 10E12/L (ref 4.4–5.9)
SODIUM SERPL-SCNC: 134 MMOL/L (ref 133–144)
TACROLIMUS BLD-MCNC: 37.6 UG/L (ref 5–15)
TME LAST DOSE: ABNORMAL H
WBC # BLD AUTO: 4.4 10E9/L (ref 4–11)

## 2021-06-24 PROCEDURE — 83735 ASSAY OF MAGNESIUM: CPT | Performed by: INTERNAL MEDICINE

## 2021-06-24 PROCEDURE — 84100 ASSAY OF PHOSPHORUS: CPT | Performed by: INTERNAL MEDICINE

## 2021-06-24 PROCEDURE — 85025 COMPLETE CBC W/AUTO DIFF WBC: CPT | Performed by: INTERNAL MEDICINE

## 2021-06-24 PROCEDURE — 80053 COMPREHEN METABOLIC PANEL: CPT | Performed by: INTERNAL MEDICINE

## 2021-06-24 PROCEDURE — 80197 ASSAY OF TACROLIMUS: CPT | Performed by: INTERNAL MEDICINE

## 2021-06-24 NOTE — TELEPHONE ENCOUNTER
Provider Call: General  Route to LPN    Reason for call: connect with vivi regarding update     Call back needed? Yes    Return Call Needed  Same as documented in contacts section  When to return call?: Greater than one day: Route standard priority

## 2021-06-24 NOTE — TELEPHONE ENCOUNTER
PT states today [place] had an episode of orthostatic BP. Started at 110/70 then went to  80/48 upon standing. No c/o dizziness. No fall. BP returned to baseline shortly after. PT told patient to push more fluids and notified us. Message sent to providers for review.

## 2021-06-24 NOTE — TELEPHONE ENCOUNTER
Home health RN called. Pt forgot to hold his tacro again this am. Pt instructed to get labs done tomorrow. We need an accurate 12 hour trough. Instructions reviewed. Importance discussed. Pt will make an appointment at [facility].     Some bloating in belly. Increase in sob with activity. Bumex decreased to 1 mg last Monday. Low appetite. Weight has increased from 190 to 194 over the course of a week. Labs from today pending. Once I get labs I will discuss with the provider.     /66. Hr 112.

## 2021-06-25 ENCOUNTER — TELEPHONE (OUTPATIENT)
Dept: PHARMACY | Facility: CLINIC | Age: 66
End: 2021-06-25

## 2021-06-25 ENCOUNTER — TELEPHONE (OUTPATIENT)
Dept: TRANSPLANT | Facility: CLINIC | Age: 66
End: 2021-06-25

## 2021-06-25 DIAGNOSIS — Z94.1 TRANSPLANTED HEART (H): ICD-10-CM

## 2021-06-25 LAB
ANION GAP SERPL CALCULATED.3IONS-SCNC: 8 MMOL/L (ref 3–14)
BUN SERPL-MCNC: 48 MG/DL (ref 7–30)
CALCIUM SERPL-MCNC: 8.5 MG/DL (ref 8.5–10.1)
CHLORIDE SERPL-SCNC: 101 MMOL/L (ref 94–109)
CO2 SERPL-SCNC: 25 MMOL/L (ref 20–32)
CREAT SERPL-MCNC: 2.07 MG/DL (ref 0.66–1.25)
ERYTHROCYTE [DISTWIDTH] IN BLOOD BY AUTOMATED COUNT: 15.1 % (ref 10–15)
GFR SERPL CREATININE-BSD FRML MDRD: 33 ML/MIN/{1.73_M2}
GLUCOSE SERPL-MCNC: 314 MG/DL (ref 70–99)
HCT VFR BLD AUTO: 32.6 % (ref 40–53)
HGB BLD-MCNC: 10.4 G/DL (ref 13.3–17.7)
MCH RBC QN AUTO: 28.9 PG (ref 26.5–33)
MCHC RBC AUTO-ENTMCNC: 31.9 G/DL (ref 31.5–36.5)
MCV RBC AUTO: 91 FL (ref 78–100)
PLATELET # BLD AUTO: 211 10E9/L (ref 150–450)
POTASSIUM SERPL-SCNC: 4.5 MMOL/L (ref 3.4–5.3)
RBC # BLD AUTO: 3.6 10E12/L (ref 4.4–5.9)
SODIUM SERPL-SCNC: 134 MMOL/L (ref 133–144)
WBC # BLD AUTO: 4.1 10E9/L (ref 4–11)

## 2021-06-25 PROCEDURE — 36415 COLL VENOUS BLD VENIPUNCTURE: CPT | Performed by: INTERNAL MEDICINE

## 2021-06-25 PROCEDURE — 85027 COMPLETE CBC AUTOMATED: CPT | Performed by: INTERNAL MEDICINE

## 2021-06-25 PROCEDURE — 80048 BASIC METABOLIC PNL TOTAL CA: CPT | Performed by: INTERNAL MEDICINE

## 2021-06-25 PROCEDURE — 80197 ASSAY OF TACROLIMUS: CPT | Performed by: INTERNAL MEDICINE

## 2021-06-25 NOTE — TELEPHONE ENCOUNTER
After reviewing belly bloating, weight gain, and labs with Dr. Montgomery the following plan communicated to patient.     Hold bactrim through the weekend to see if your belly bloating improves.     Of note, pt has no fluid retention in lower extremities, weight is 195 (baseline 192), Lbm last pm (normal), no sob, no temp, no s/s of infection.     Last two tacro level's patient has taken meds prior to draw. Today's level is an accurate 12 hour trough. Level is pending at this time. I will notify patient when results are completed.

## 2021-06-25 NOTE — TELEPHONE ENCOUNTER
Called x2 for scheduled MTM visit, no answer, LMOM.    Francesca Odom, PharmD, BCACP  3:31 PM 06/25/21

## 2021-06-26 LAB
TACROLIMUS BLD-MCNC: 9.3 UG/L (ref 5–15)
TME LAST DOSE: 2325 H

## 2021-06-28 ENCOUNTER — HOSPITAL ENCOUNTER (INPATIENT)
Facility: CLINIC | Age: 66
LOS: 7 days | Discharge: HOME | DRG: 871 | End: 2021-07-05
Attending: EMERGENCY MEDICINE | Admitting: INTERNAL MEDICINE
Payer: COMMERCIAL

## 2021-06-28 ENCOUNTER — TELEPHONE (OUTPATIENT)
Dept: CARDIOLOGY | Facility: CLINIC | Age: 66
End: 2021-06-28

## 2021-06-28 ENCOUNTER — APPOINTMENT (OUTPATIENT)
Dept: GENERAL RADIOLOGY | Facility: CLINIC | Age: 66
DRG: 871 | End: 2021-06-28
Attending: EMERGENCY MEDICINE
Payer: COMMERCIAL

## 2021-06-28 ENCOUNTER — APPOINTMENT (OUTPATIENT)
Dept: CT IMAGING | Facility: CLINIC | Age: 66
DRG: 871 | End: 2021-06-28
Attending: STUDENT IN AN ORGANIZED HEALTH CARE EDUCATION/TRAINING PROGRAM
Payer: COMMERCIAL

## 2021-06-28 DIAGNOSIS — Z94.1 TRANSPLANTED HEART (H): ICD-10-CM

## 2021-06-28 DIAGNOSIS — Z94.1 HEART REPLACED BY TRANSPLANT (H): ICD-10-CM

## 2021-06-28 DIAGNOSIS — Z87.891 PERSONAL HISTORY OF TOBACCO USE, PRESENTING HAZARDS TO HEALTH: ICD-10-CM

## 2021-06-28 DIAGNOSIS — J15.9 BACTERIAL PNEUMONIA: ICD-10-CM

## 2021-06-28 DIAGNOSIS — Z11.52 ENCOUNTER FOR SCREENING LABORATORY TESTING FOR SEVERE ACUTE RESPIRATORY SYNDROME CORONAVIRUS 2 (SARS-COV-2): ICD-10-CM

## 2021-06-28 DIAGNOSIS — I50.22 CHRONIC SYSTOLIC CONGESTIVE HEART FAILURE (H): ICD-10-CM

## 2021-06-28 DIAGNOSIS — R50.9 FEVER: Primary | ICD-10-CM

## 2021-06-28 DIAGNOSIS — Z94.1 S/P ORTHOTOPIC HEART TRANSPLANT (H): ICD-10-CM

## 2021-06-28 DIAGNOSIS — H04.123 DRY EYES: ICD-10-CM

## 2021-06-28 DIAGNOSIS — E11.8 TYPE 2 DIABETES MELLITUS WITH COMPLICATION, WITH LONG-TERM CURRENT USE OF INSULIN (H): ICD-10-CM

## 2021-06-28 DIAGNOSIS — J18.1 UNRESOLVED LOBAR PNEUMONIA (H): ICD-10-CM

## 2021-06-28 DIAGNOSIS — J18.9 PNEUMONIA OF RIGHT LOWER LOBE DUE TO INFECTIOUS ORGANISM: ICD-10-CM

## 2021-06-28 DIAGNOSIS — J90 PLEURAL EFFUSION, RIGHT: ICD-10-CM

## 2021-06-28 DIAGNOSIS — Z79.4 TYPE 2 DIABETES MELLITUS WITH COMPLICATION, WITH LONG-TERM CURRENT USE OF INSULIN (H): ICD-10-CM

## 2021-06-28 LAB
ALBUMIN SERPL-MCNC: 3.2 G/DL (ref 3.4–5)
ALP SERPL-CCNC: 268 U/L (ref 40–150)
ALT SERPL W P-5'-P-CCNC: 70 U/L (ref 0–70)
ANION GAP SERPL CALCULATED.3IONS-SCNC: 6 MMOL/L (ref 3–14)
ANISOCYTOSIS BLD QL SMEAR: SLIGHT
AST SERPL W P-5'-P-CCNC: 38 U/L (ref 0–45)
BASE EXCESS BLDV CALC-SCNC: 0.6 MMOL/L
BASOPHILS # BLD AUTO: 0 10E9/L (ref 0–0.2)
BASOPHILS NFR BLD AUTO: 0.9 %
BILIRUB SERPL-MCNC: 0.5 MG/DL (ref 0.2–1.3)
BUN SERPL-MCNC: 39 MG/DL (ref 7–30)
CALCIUM SERPL-MCNC: 8.6 MG/DL (ref 8.5–10.1)
CHLORIDE SERPL-SCNC: 94 MMOL/L (ref 94–109)
CMV DNA SPEC NAA+PROBE-ACNC: NORMAL [IU]/ML
CMV DNA SPEC NAA+PROBE-LOG#: NORMAL {LOG_IU}/ML
CO2 SERPL-SCNC: 25 MMOL/L (ref 20–32)
CREAT SERPL-MCNC: 1.85 MG/DL (ref 0.66–1.25)
DIFFERENTIAL METHOD BLD: ABNORMAL
EOSINOPHIL # BLD AUTO: 0 10E9/L (ref 0–0.7)
EOSINOPHIL NFR BLD AUTO: 0 %
ERYTHROCYTE [DISTWIDTH] IN BLOOD BY AUTOMATED COUNT: 15.5 % (ref 10–15)
GFR SERPL CREATININE-BSD FRML MDRD: 38 ML/MIN/{1.73_M2}
GLUCOSE SERPL-MCNC: 487 MG/DL (ref 70–99)
HCO3 BLDV-SCNC: 26 MMOL/L (ref 21–28)
HCT VFR BLD AUTO: 35.7 % (ref 40–53)
HGB BLD-MCNC: 11.3 G/DL (ref 13.3–17.7)
LACTATE BLD-SCNC: 1.4 MMOL/L (ref 0.7–2)
LYMPHOCYTES # BLD AUTO: 0.2 10E9/L (ref 0.8–5.3)
LYMPHOCYTES NFR BLD AUTO: 3.6 %
MCH RBC QN AUTO: 29.4 PG (ref 26.5–33)
MCHC RBC AUTO-ENTMCNC: 31.7 G/DL (ref 31.5–36.5)
MCV RBC AUTO: 93 FL (ref 78–100)
MONOCYTES # BLD AUTO: 0.6 10E9/L (ref 0–1.3)
MONOCYTES NFR BLD AUTO: 12.7 %
NEUTROPHILS # BLD AUTO: 4.2 10E9/L (ref 1.6–8.3)
NEUTROPHILS NFR BLD AUTO: 82.8 %
NT-PROBNP SERPL-MCNC: 1697 PG/ML (ref 0–900)
O2/TOTAL GAS SETTING VFR VENT: 21 %
OVALOCYTES BLD QL SMEAR: SLIGHT
PCO2 BLDV: 42 MM HG (ref 40–50)
PH BLDV: 7.39 PH (ref 7.32–7.43)
PLATELET # BLD AUTO: 243 10E9/L (ref 150–450)
PLATELET # BLD EST: ABNORMAL 10*3/UL
PO2 BLDV: 23 MM HG (ref 25–47)
POIKILOCYTOSIS BLD QL SMEAR: SLIGHT
POTASSIUM SERPL-SCNC: 5.3 MMOL/L (ref 3.4–5.3)
PROT SERPL-MCNC: 6.5 G/DL (ref 6.8–8.8)
RBC # BLD AUTO: 3.84 10E12/L (ref 4.4–5.9)
SODIUM SERPL-SCNC: 125 MMOL/L (ref 133–144)
SPECIMEN SOURCE: NORMAL
TROPONIN I SERPL-MCNC: <0.015 UG/L (ref 0–0.04)
WBC # BLD AUTO: 5.1 10E9/L (ref 4–11)

## 2021-06-28 PROCEDURE — 71250 CT THORAX DX C-: CPT | Mod: 26 | Performed by: RADIOLOGY

## 2021-06-28 PROCEDURE — 84484 ASSAY OF TROPONIN QUANT: CPT | Performed by: EMERGENCY MEDICINE

## 2021-06-28 PROCEDURE — 87305 ASPERGILLUS AG IA: CPT | Performed by: EMERGENCY MEDICINE

## 2021-06-28 PROCEDURE — U0005 INFEC AGEN DETEC AMPLI PROBE: HCPCS | Performed by: EMERGENCY MEDICINE

## 2021-06-28 PROCEDURE — 82803 BLOOD GASES ANY COMBINATION: CPT | Performed by: EMERGENCY MEDICINE

## 2021-06-28 PROCEDURE — 85025 COMPLETE CBC W/AUTO DIFF WBC: CPT | Performed by: EMERGENCY MEDICINE

## 2021-06-28 PROCEDURE — 87800 DETECT AGNT MULT DNA DIREC: CPT | Performed by: EMERGENCY MEDICINE

## 2021-06-28 PROCEDURE — 93010 ELECTROCARDIOGRAM REPORT: CPT | Performed by: EMERGENCY MEDICINE

## 2021-06-28 PROCEDURE — 250N000013 HC RX MED GY IP 250 OP 250 PS 637: Performed by: EMERGENCY MEDICINE

## 2021-06-28 PROCEDURE — 83605 ASSAY OF LACTIC ACID: CPT | Performed by: EMERGENCY MEDICINE

## 2021-06-28 PROCEDURE — 83880 ASSAY OF NATRIURETIC PEPTIDE: CPT | Performed by: EMERGENCY MEDICINE

## 2021-06-28 PROCEDURE — 92960 CARDIOVERSION ELECTRIC EXT: CPT | Performed by: EMERGENCY MEDICINE

## 2021-06-28 PROCEDURE — 71250 CT THORAX DX C-: CPT

## 2021-06-28 PROCEDURE — 96366 THER/PROPH/DIAG IV INF ADDON: CPT | Performed by: EMERGENCY MEDICINE

## 2021-06-28 PROCEDURE — 80053 COMPREHEN METABOLIC PANEL: CPT | Performed by: EMERGENCY MEDICINE

## 2021-06-28 PROCEDURE — 87040 BLOOD CULTURE FOR BACTERIA: CPT | Performed by: EMERGENCY MEDICINE

## 2021-06-28 PROCEDURE — 99285 EMERGENCY DEPT VISIT HI MDM: CPT | Mod: 25 | Performed by: EMERGENCY MEDICINE

## 2021-06-28 PROCEDURE — 87449 NOS EACH ORGANISM AG IA: CPT | Performed by: EMERGENCY MEDICINE

## 2021-06-28 PROCEDURE — 87077 CULTURE AEROBIC IDENTIFY: CPT | Performed by: EMERGENCY MEDICINE

## 2021-06-28 PROCEDURE — 86140 C-REACTIVE PROTEIN: CPT | Performed by: EMERGENCY MEDICINE

## 2021-06-28 PROCEDURE — 84145 PROCALCITONIN (PCT): CPT | Performed by: EMERGENCY MEDICINE

## 2021-06-28 PROCEDURE — 250N000011 HC RX IP 250 OP 636: Performed by: EMERGENCY MEDICINE

## 2021-06-28 PROCEDURE — 87186 SC STD MICRODIL/AGAR DIL: CPT | Performed by: EMERGENCY MEDICINE

## 2021-06-28 PROCEDURE — 96365 THER/PROPH/DIAG IV INF INIT: CPT | Performed by: EMERGENCY MEDICINE

## 2021-06-28 PROCEDURE — 99283 EMERGENCY DEPT VISIT LOW MDM: CPT | Mod: 25 | Performed by: EMERGENCY MEDICINE

## 2021-06-28 PROCEDURE — C9803 HOPD COVID-19 SPEC COLLECT: HCPCS | Performed by: EMERGENCY MEDICINE

## 2021-06-28 PROCEDURE — 71045 X-RAY EXAM CHEST 1 VIEW: CPT | Mod: 26 | Performed by: RADIOLOGY

## 2021-06-28 PROCEDURE — 87799 DETECT AGENT NOS DNA QUANT: CPT | Performed by: EMERGENCY MEDICINE

## 2021-06-28 PROCEDURE — U0003 INFECTIOUS AGENT DETECTION BY NUCLEIC ACID (DNA OR RNA); SEVERE ACUTE RESPIRATORY SYNDROME CORONAVIRUS 2 (SARS-COV-2) (CORONAVIRUS DISEASE [COVID-19]), AMPLIFIED PROBE TECHNIQUE, MAKING USE OF HIGH THROUGHPUT TECHNOLOGIES AS DESCRIBED BY CMS-2020-01-R: HCPCS | Performed by: EMERGENCY MEDICINE

## 2021-06-28 PROCEDURE — 120N000003 HC R&B IMCU UMMC

## 2021-06-28 PROCEDURE — 74176 CT ABD & PELVIS W/O CONTRAST: CPT | Mod: 26 | Performed by: RADIOLOGY

## 2021-06-28 PROCEDURE — 71045 X-RAY EXAM CHEST 1 VIEW: CPT

## 2021-06-28 RX ORDER — VANCOMYCIN HYDROCHLORIDE 1 G/200ML
1000 INJECTION, SOLUTION INTRAVENOUS EVERY 24 HOURS
Status: COMPLETED | OUTPATIENT
Start: 2021-06-30 | End: 2021-07-02

## 2021-06-28 RX ORDER — ACETAMINOPHEN 325 MG/1
975 TABLET ORAL ONCE
Status: COMPLETED | OUTPATIENT
Start: 2021-06-28 | End: 2021-06-28

## 2021-06-28 RX ADMIN — ACETAMINOPHEN 975 MG: 325 TABLET, FILM COATED ORAL at 22:54

## 2021-06-28 RX ADMIN — CEFEPIME HYDROCHLORIDE 2 G: 2 INJECTION, POWDER, FOR SOLUTION INTRAVENOUS at 22:55

## 2021-06-28 ASSESSMENT — ENCOUNTER SYMPTOMS
COUGH: 0
CHILLS: 1
SHORTNESS OF BREATH: 0
APPETITE CHANGE: 1
WEAKNESS: 1
VOMITING: 0
ABDOMINAL PAIN: 0
BLOOD IN STOOL: 0
UNEXPECTED WEIGHT CHANGE: 0
FEVER: 1
NAUSEA: 0

## 2021-06-28 ASSESSMENT — MIFFLIN-ST. JEOR: SCORE: 1663.08

## 2021-06-28 NOTE — Clinical Note
dry, intact, no bleeding and no hematoma. 7 Fr RIJsheath removed, manual pressure held until hemostasis. No bleeding, oozing, or hematoma.

## 2021-06-28 NOTE — LETTER
Transition Communication Hand-off for Care Transitions to Next Level of Care Provider    Name: Jim Willingham  : 1955  MRN #: [id number]  Primary Care Provider: Ricardo Gómez     Primary Clinic: [facility] [place]  [address]  [facility] [address]     Reason for Hospitalization:  Transplanted heart (H) [Z94.1]  Fever [R50.9]  Pleural effusion, right [J90]  S/P orthotopic heart transplant (H) [Z94.1]  Pneumonia of right lower lobe due to infectious organism [J18.9]  Admit Date/Time: 2021  9:52 PM  Discharge Date: 2021  Payor Source: Payor: [employer] / Plan: [employer] OPEN ACCESS / Product Type: HMO /          Reason for Communication Hand-off Referral: Multiple providers/specialties    Discharge Plan: Patient is discharging home with resumption of home care-RN/PT/OT       Concern for non-adherence with plan of care:   N  Discharge Needs Assessment:  Needs      Most Recent Value   Equipment Currently Used at Home  walker, rolling, shower chair   # of Referrals Placed by CTS  Homecare, External Care Coordination        Follow-up plan:    Future Appointments   Date Time Provider Department Center   2021  6:00 AM Jolanta Chu OT [facility] O   2021  6:00 AM Farrah Bruno, PT [facility] O   2021  9:30 AM Shelia Means, BARRIE [facility]   2021 11:30 AM  LAB [facility]   2021  9:30 AM UU LAB GOLD WAITING [facility] O   2021 10:00 AM U2A ROOM 5 [facility] O   2021  9:30 AM UUECHR2 [facility] O   2021 10:30 AM UU LAB GOLD WAITING [facility] O   2021 11:00 AM U2A ROOM 16 [facility] O   2021  3:30 PM Rajani Wilder, APRN CNP [facility]   2021  7:45 AM  LAB UCLABR [facility]   2021 10:30 AM UU LAB GOLD WAITING [facility] O   2021 11:00 AM U2A ROOM 5 [facility] O   2021 12:00 AM UC ICD REMOTE UCCVSV [facility]   10/5/2021 10:30 AM  LAB GOLD WAITING [facility] O   10/5/2021  11:00 AM U2A ROOM 5 [facility] O       Any outstanding tests or procedures:        Radiology & Cardiology Orders     Future Labs/Procedures Complete By Expires    CT Chest w/o contrast  7/19/2021 (Approximate) 10/3/2021    Process Instructions:    Administration of IV contrast (contrast agent, dose, and amount) will be tailored to this examination per the appropriate written protocol listed in the Protocol E-Book, or by the supervising imaging provider.         Referrals     Future Labs/Procedures    Occupational Therapy Referral     Process Instructions:    Work Related Injury: Functional Capacity and Work Conditioning are only offered at [facility] (service can be provided by PT or OT).    *This therapy referral will be filtered to a centralized scheduling office at [facility] and the patient will receive a call to schedule an appointment at a [place] location most convenient for them. *    Comments:    If you have not heard from the scheduling office within 2 business days, please call [phone] for all locations, with the exception of [place], please call [phone] and [place], please call [phone].    Please be aware that coverage of these services is subject to the terms and limitations of your health insurance plan.  Call member services at your health plan with any benefit or coverage questions.      Physical Therapy Referral     Process Instructions:    Work Related Injury: Functional Capacity and Work Conditioning are only offered at [facility] (service can be provided by PT or OT).    *This therapy referral will be filtered to a centralized scheduling office at [facility] and the patient will receive a call to schedule an appointment at a [place] location most convenient for them. *    Comments:    If you have not heard from the scheduling office within 2 business days, please call [phone] for  all locations, with the exception of [place], please call [phone] and [facility], please call [phone].    Please be aware that coverage of these services is subject to the terms and limitations of your health insurance plan.  Call member services at your health plan with any benefit or coverage questions.      Home care nursing referral     Comments:    Resumption of home care services:     [facility]  [address] [place] [id number]  Phone [phone]   Fax [phone]     RN, PT, and OT      Your provider has ordered home care nursing services. If you have not been contacted within 2 days of your discharge please call the inpatient department phone number at [phone] .            Vi Clement RNCC    AVS/Discharge Summary is the source of truth; this is a helpful guide for improved communication of patient story

## 2021-06-29 ENCOUNTER — APPOINTMENT (OUTPATIENT)
Dept: CARDIOLOGY | Facility: CLINIC | Age: 66
DRG: 871 | End: 2021-06-29
Attending: STUDENT IN AN ORGANIZED HEALTH CARE EDUCATION/TRAINING PROGRAM
Payer: COMMERCIAL

## 2021-06-29 ENCOUNTER — APPOINTMENT (OUTPATIENT)
Dept: PHYSICAL THERAPY | Facility: CLINIC | Age: 66
DRG: 871 | End: 2021-06-29
Attending: STUDENT IN AN ORGANIZED HEALTH CARE EDUCATION/TRAINING PROGRAM
Payer: COMMERCIAL

## 2021-06-29 ENCOUNTER — APPOINTMENT (OUTPATIENT)
Dept: GENERAL RADIOLOGY | Facility: CLINIC | Age: 66
DRG: 871 | End: 2021-06-29
Attending: PEDIATRICS
Payer: COMMERCIAL

## 2021-06-29 ENCOUNTER — APPOINTMENT (OUTPATIENT)
Dept: ULTRASOUND IMAGING | Facility: CLINIC | Age: 66
DRG: 871 | End: 2021-06-29
Attending: NURSE PRACTITIONER
Payer: COMMERCIAL

## 2021-06-29 LAB
ALBUMIN SERPL-MCNC: 2.5 G/DL (ref 3.4–5)
ALP SERPL-CCNC: 217 U/L (ref 40–150)
ALT SERPL W P-5'-P-CCNC: 47 U/L (ref 0–70)
ANION GAP SERPL CALCULATED.3IONS-SCNC: 6 MMOL/L (ref 3–14)
APPEARANCE FLD: NORMAL
AST SERPL W P-5'-P-CCNC: 19 U/L (ref 0–45)
BASOPHILS # BLD AUTO: 0 10E9/L (ref 0–0.2)
BASOPHILS NFR BLD AUTO: 0 %
BILIRUB DIRECT SERPL-MCNC: 0.1 MG/DL (ref 0–0.2)
BILIRUB SERPL-MCNC: 0.6 MG/DL (ref 0.2–1.3)
BUN SERPL-MCNC: 41 MG/DL (ref 7–30)
C PNEUM DNA SPEC QL NAA+PROBE: NOT DETECTED
CALCIUM SERPL-MCNC: 8.3 MG/DL (ref 8.5–10.1)
CHLORIDE SERPL-SCNC: 100 MMOL/L (ref 94–109)
CO2 SERPL-SCNC: 23 MMOL/L (ref 20–32)
COLOR FLD: NORMAL
CREAT SERPL-MCNC: 1.81 MG/DL (ref 0.66–1.25)
CRP SERPL-MCNC: 27 MG/L (ref 0–8)
CRYPTOC AG SPEC QL: NORMAL
DIFFERENTIAL METHOD BLD: ABNORMAL
EOSINOPHIL # BLD AUTO: 0 10E9/L (ref 0–0.7)
EOSINOPHIL NFR BLD AUTO: 0 %
ERYTHROCYTE [DISTWIDTH] IN BLOOD BY AUTOMATED COUNT: 15.6 % (ref 10–15)
ERYTHROCYTE [DISTWIDTH] IN BLOOD BY AUTOMATED COUNT: 15.9 % (ref 10–15)
FLUAV H1 2009 PAND RNA SPEC QL NAA+PROBE: NOT DETECTED
FLUAV H1 RNA SPEC QL NAA+PROBE: NOT DETECTED
FLUAV H3 RNA SPEC QL NAA+PROBE: NOT DETECTED
FLUAV RNA SPEC QL NAA+PROBE: NOT DETECTED
FLUBV RNA SPEC QL NAA+PROBE: NOT DETECTED
GFR SERPL CREATININE-BSD FRML MDRD: 39 ML/MIN/{1.73_M2}
GLUCOSE BLDC GLUCOMTR-MCNC: 138 MG/DL (ref 70–99)
GLUCOSE BLDC GLUCOMTR-MCNC: 176 MG/DL (ref 70–99)
GLUCOSE BLDC GLUCOMTR-MCNC: 179 MG/DL (ref 70–99)
GLUCOSE BLDC GLUCOMTR-MCNC: 350 MG/DL (ref 70–99)
GLUCOSE BLDC GLUCOMTR-MCNC: 385 MG/DL (ref 70–99)
GLUCOSE FLD-MCNC: 169 MG/DL
GLUCOSE SERPL-MCNC: 354 MG/DL (ref 70–99)
HADV DNA SPEC QL NAA+PROBE: NOT DETECTED
HCOV PNL SPEC NAA+PROBE: NOT DETECTED
HCT VFR BLD AUTO: 28.3 % (ref 40–53)
HCT VFR BLD AUTO: 30.6 % (ref 40–53)
HGB BLD-MCNC: 8.8 G/DL (ref 13.3–17.7)
HGB BLD-MCNC: 9.4 G/DL (ref 13.3–17.7)
HMPV RNA SPEC QL NAA+PROBE: NOT DETECTED
HPIV1 RNA SPEC QL NAA+PROBE: NOT DETECTED
HPIV2 RNA SPEC QL NAA+PROBE: NOT DETECTED
HPIV3 RNA SPEC QL NAA+PROBE: NOT DETECTED
HPIV4 RNA SPEC QL NAA+PROBE: NOT DETECTED
INTERPRETATION ECG - MUSE: NORMAL
LABORATORY COMMENT REPORT: NORMAL
LDH FLD L TO P-CCNC: 335 U/L
LYMPHOCYTES # BLD AUTO: 1.4 10E9/L (ref 0.8–5.3)
LYMPHOCYTES NFR BLD AUTO: 17.3 %
LYMPHOCYTES NFR FLD MANUAL: 42 %
M PNEUMO DNA SPEC QL NAA+PROBE: NOT DETECTED
MAGNESIUM SERPL-MCNC: 1.5 MG/DL (ref 1.6–2.3)
MCH RBC QN AUTO: 28.7 PG (ref 26.5–33)
MCH RBC QN AUTO: 29.3 PG (ref 26.5–33)
MCHC RBC AUTO-ENTMCNC: 30.7 G/DL (ref 31.5–36.5)
MCHC RBC AUTO-ENTMCNC: 31.1 G/DL (ref 31.5–36.5)
MCV RBC AUTO: 93 FL (ref 78–100)
MCV RBC AUTO: 94 FL (ref 78–100)
METAMYELOCYTES # BLD: 0.4 10E9/L
METAMYELOCYTES NFR BLD MANUAL: 5.5 %
MICROBIOLOGIST REVIEW: NORMAL
MONOCYTES # BLD AUTO: 0.9 10E9/L (ref 0–1.3)
MONOCYTES NFR BLD AUTO: 11.8 %
MONOS+MACROS NFR FLD MANUAL: 16 %
MRSA DNA SPEC QL NAA+PROBE: NEGATIVE
MYELOCYTES # BLD: 0.4 10E9/L
MYELOCYTES NFR BLD MANUAL: 4.5 %
NEUTROPHILS # BLD AUTO: 4.9 10E9/L (ref 1.6–8.3)
NEUTROPHILS NFR BLD AUTO: 60.9 %
NEUTS BAND NFR FLD MANUAL: 42 %
NRBC # BLD AUTO: 0.1 10*3/UL
NRBC BLD AUTO-RTO: 1 /100
PH FLD: 7.6 PH
PLATELET # BLD AUTO: 164 10E9/L (ref 150–450)
PLATELET # BLD AUTO: 240 10E9/L (ref 150–450)
PLATELET # BLD EST: ABNORMAL 10*3/UL
POTASSIUM SERPL-SCNC: 5.3 MMOL/L (ref 3.4–5.3)
PROCALCITONIN SERPL-MCNC: 0.3 NG/ML
PROT FLD-MCNC: 2.9 G/DL
PROT SERPL-MCNC: 5.7 G/DL (ref 6.8–8.8)
RBC # BLD AUTO: 3 10E12/L (ref 4.4–5.9)
RBC # BLD AUTO: 3.28 10E12/L (ref 4.4–5.9)
RSV RNA SPEC QL NAA+PROBE: NOT DETECTED
RSV RNA SPEC QL NAA+PROBE: NOT DETECTED
RV+EV RNA SPEC QL NAA+PROBE: NOT DETECTED
SARS-COV-2 RNA RESP QL NAA+PROBE: NEGATIVE
SODIUM SERPL-SCNC: 129 MMOL/L (ref 133–144)
SPECIMEN SOURCE FLD: NORMAL
SPECIMEN SOURCE: NORMAL
TACROLIMUS BLD-MCNC: 6.8 UG/L (ref 5–15)
TME LAST DOSE: NORMAL H
VARIANT LYMPHS BLD QL SMEAR: PRESENT
WBC # BLD AUTO: 7.8 10E9/L (ref 4–11)
WBC # BLD AUTO: 8 10E9/L (ref 4–11)
WBC # FLD AUTO: 770 /UL

## 2021-06-29 PROCEDURE — 97161 PT EVAL LOW COMPLEX 20 MIN: CPT | Mod: GP

## 2021-06-29 PROCEDURE — 83986 ASSAY PH BODY FLUID NOS: CPT | Performed by: STUDENT IN AN ORGANIZED HEALTH CARE EDUCATION/TRAINING PROGRAM

## 2021-06-29 PROCEDURE — 84157 ASSAY OF PROTEIN OTHER: CPT | Performed by: STUDENT IN AN ORGANIZED HEALTH CARE EDUCATION/TRAINING PROGRAM

## 2021-06-29 PROCEDURE — 250N000009 HC RX 250: Performed by: EMERGENCY MEDICINE

## 2021-06-29 PROCEDURE — 120N000003 HC R&B IMCU UMMC

## 2021-06-29 PROCEDURE — 87581 M.PNEUMON DNA AMP PROBE: CPT | Performed by: STUDENT IN AN ORGANIZED HEALTH CARE EDUCATION/TRAINING PROGRAM

## 2021-06-29 PROCEDURE — 99223 1ST HOSP IP/OBS HIGH 75: CPT | Performed by: INTERNAL MEDICINE

## 2021-06-29 PROCEDURE — 76604 US EXAM CHEST: CPT | Mod: 26 | Performed by: RADIOLOGY

## 2021-06-29 PROCEDURE — 93321 DOPPLER ECHO F-UP/LMTD STD: CPT | Mod: 26 | Performed by: INTERNAL MEDICINE

## 2021-06-29 PROCEDURE — 89051 BODY FLUID CELL COUNT: CPT | Performed by: STUDENT IN AN ORGANIZED HEALTH CARE EDUCATION/TRAINING PROGRAM

## 2021-06-29 PROCEDURE — 86612 BLASTOMYCES ANTIBODY: CPT | Performed by: INTERNAL MEDICINE

## 2021-06-29 PROCEDURE — 86635 COCCIDIOIDES ANTIBODY: CPT | Performed by: INTERNAL MEDICINE

## 2021-06-29 PROCEDURE — 250N000011 HC RX IP 250 OP 636: Performed by: NURSE PRACTITIONER

## 2021-06-29 PROCEDURE — 94640 AIRWAY INHALATION TREATMENT: CPT | Performed by: EMERGENCY MEDICINE

## 2021-06-29 PROCEDURE — 87385 HISTOPLASMA CAPSUL AG IA: CPT | Performed by: INTERNAL MEDICINE

## 2021-06-29 PROCEDURE — 87486 CHLMYD PNEUM DNA AMP PROBE: CPT | Performed by: STUDENT IN AN ORGANIZED HEALTH CARE EDUCATION/TRAINING PROGRAM

## 2021-06-29 PROCEDURE — 250N000012 HC RX MED GY IP 250 OP 636 PS 637: Performed by: NURSE PRACTITIONER

## 2021-06-29 PROCEDURE — 80048 BASIC METABOLIC PNL TOTAL CA: CPT | Performed by: STUDENT IN AN ORGANIZED HEALTH CARE EDUCATION/TRAINING PROGRAM

## 2021-06-29 PROCEDURE — 96361 HYDRATE IV INFUSION ADD-ON: CPT | Performed by: EMERGENCY MEDICINE

## 2021-06-29 PROCEDURE — 86698 HISTOPLASMA ANTIBODY: CPT | Performed by: INTERNAL MEDICINE

## 2021-06-29 PROCEDURE — 87116 MYCOBACTERIA CULTURE: CPT | Performed by: STUDENT IN AN ORGANIZED HEALTH CARE EDUCATION/TRAINING PROGRAM

## 2021-06-29 PROCEDURE — 36415 COLL VENOUS BLD VENIPUNCTURE: CPT | Performed by: STUDENT IN AN ORGANIZED HEALTH CARE EDUCATION/TRAINING PROGRAM

## 2021-06-29 PROCEDURE — 87640 STAPH A DNA AMP PROBE: CPT | Performed by: INTERNAL MEDICINE

## 2021-06-29 PROCEDURE — 96372 THER/PROPH/DIAG INJ SC/IM: CPT | Mod: 59 | Performed by: EMERGENCY MEDICINE

## 2021-06-29 PROCEDURE — 250N000013 HC RX MED GY IP 250 OP 250 PS 637: Performed by: INTERNAL MEDICINE

## 2021-06-29 PROCEDURE — 93325 DOPPLER ECHO COLOR FLOW MAPG: CPT | Mod: 26 | Performed by: INTERNAL MEDICINE

## 2021-06-29 PROCEDURE — 250N000011 HC RX IP 250 OP 636: Performed by: EMERGENCY MEDICINE

## 2021-06-29 PROCEDURE — 87070 CULTURE OTHR SPECIMN AEROBIC: CPT | Performed by: STUDENT IN AN ORGANIZED HEALTH CARE EDUCATION/TRAINING PROGRAM

## 2021-06-29 PROCEDURE — 87102 FUNGUS ISOLATION CULTURE: CPT | Performed by: STUDENT IN AN ORGANIZED HEALTH CARE EDUCATION/TRAINING PROGRAM

## 2021-06-29 PROCEDURE — 250N000013 HC RX MED GY IP 250 OP 250 PS 637: Performed by: STUDENT IN AN ORGANIZED HEALTH CARE EDUCATION/TRAINING PROGRAM

## 2021-06-29 PROCEDURE — 87899 AGENT NOS ASSAY W/OPTIC: CPT | Performed by: INTERNAL MEDICINE

## 2021-06-29 PROCEDURE — 87015 SPECIMEN INFECT AGNT CONCNTJ: CPT | Performed by: STUDENT IN AN ORGANIZED HEALTH CARE EDUCATION/TRAINING PROGRAM

## 2021-06-29 PROCEDURE — 85027 COMPLETE CBC AUTOMATED: CPT | Performed by: INTERNAL MEDICINE

## 2021-06-29 PROCEDURE — 258N000003 HC RX IP 258 OP 636: Performed by: STUDENT IN AN ORGANIZED HEALTH CARE EDUCATION/TRAINING PROGRAM

## 2021-06-29 PROCEDURE — 87205 SMEAR GRAM STAIN: CPT | Performed by: STUDENT IN AN ORGANIZED HEALTH CARE EDUCATION/TRAINING PROGRAM

## 2021-06-29 PROCEDURE — 85025 COMPLETE CBC W/AUTO DIFF WBC: CPT | Performed by: INTERNAL MEDICINE

## 2021-06-29 PROCEDURE — 0W993ZZ DRAINAGE OF RIGHT PLEURAL CAVITY, PERCUTANEOUS APPROACH: ICD-10-PCS | Performed by: PEDIATRICS

## 2021-06-29 PROCEDURE — 36415 COLL VENOUS BLD VENIPUNCTURE: CPT | Performed by: INTERNAL MEDICINE

## 2021-06-29 PROCEDURE — 32555 ASPIRATE PLEURA W/ IMAGING: CPT | Performed by: PEDIATRICS

## 2021-06-29 PROCEDURE — 93308 TTE F-UP OR LMTD: CPT | Mod: 26 | Performed by: INTERNAL MEDICINE

## 2021-06-29 PROCEDURE — 250N000012 HC RX MED GY IP 250 OP 636 PS 637: Performed by: STUDENT IN AN ORGANIZED HEALTH CARE EDUCATION/TRAINING PROGRAM

## 2021-06-29 PROCEDURE — 87449 NOS EACH ORGANISM AG IA: CPT | Performed by: INTERNAL MEDICINE

## 2021-06-29 PROCEDURE — 97530 THERAPEUTIC ACTIVITIES: CPT | Mod: GP

## 2021-06-29 PROCEDURE — 83735 ASSAY OF MAGNESIUM: CPT | Performed by: STUDENT IN AN ORGANIZED HEALTH CARE EDUCATION/TRAINING PROGRAM

## 2021-06-29 PROCEDURE — 87103 BLOOD FUNGUS CULTURE: CPT | Performed by: INTERNAL MEDICINE

## 2021-06-29 PROCEDURE — 80197 ASSAY OF TACROLIMUS: CPT | Performed by: INTERNAL MEDICINE

## 2021-06-29 PROCEDURE — 87075 CULTR BACTERIA EXCEPT BLOOD: CPT | Performed by: STUDENT IN AN ORGANIZED HEALTH CARE EDUCATION/TRAINING PROGRAM

## 2021-06-29 PROCEDURE — 80076 HEPATIC FUNCTION PANEL: CPT | Performed by: STUDENT IN AN ORGANIZED HEALTH CARE EDUCATION/TRAINING PROGRAM

## 2021-06-29 PROCEDURE — 87633 RESP VIRUS 12-25 TARGETS: CPT | Performed by: STUDENT IN AN ORGANIZED HEALTH CARE EDUCATION/TRAINING PROGRAM

## 2021-06-29 PROCEDURE — 96366 THER/PROPH/DIAG IV INF ADDON: CPT | Performed by: EMERGENCY MEDICINE

## 2021-06-29 PROCEDURE — 999N000065 XR CHEST PORT 1 VIEW

## 2021-06-29 PROCEDURE — 258N000003 HC RX IP 258 OP 636: Performed by: EMERGENCY MEDICINE

## 2021-06-29 PROCEDURE — 76604 US EXAM CHEST: CPT

## 2021-06-29 PROCEDURE — 87206 SMEAR FLUORESCENT/ACID STAI: CPT | Performed by: STUDENT IN AN ORGANIZED HEALTH CARE EDUCATION/TRAINING PROGRAM

## 2021-06-29 PROCEDURE — 99222 1ST HOSP IP/OBS MODERATE 55: CPT | Mod: 24 | Performed by: INTERNAL MEDICINE

## 2021-06-29 PROCEDURE — 250N000011 HC RX IP 250 OP 636: Performed by: STUDENT IN AN ORGANIZED HEALTH CARE EDUCATION/TRAINING PROGRAM

## 2021-06-29 PROCEDURE — 96367 TX/PROPH/DG ADDL SEQ IV INF: CPT | Performed by: EMERGENCY MEDICINE

## 2021-06-29 PROCEDURE — 82945 GLUCOSE OTHER FLUID: CPT | Performed by: STUDENT IN AN ORGANIZED HEALTH CARE EDUCATION/TRAINING PROGRAM

## 2021-06-29 PROCEDURE — 87641 MR-STAPH DNA AMP PROBE: CPT | Performed by: INTERNAL MEDICINE

## 2021-06-29 PROCEDURE — 71045 X-RAY EXAM CHEST 1 VIEW: CPT | Mod: 26 | Performed by: RADIOLOGY

## 2021-06-29 PROCEDURE — 258N000003 HC RX IP 258 OP 636: Performed by: NURSE PRACTITIONER

## 2021-06-29 PROCEDURE — 86606 ASPERGILLUS ANTIBODY: CPT | Performed by: INTERNAL MEDICINE

## 2021-06-29 PROCEDURE — 999N001017 HC STATISTIC GLUCOSE BY METER IP

## 2021-06-29 PROCEDURE — 99223 1ST HOSP IP/OBS HIGH 75: CPT | Mod: 24 | Performed by: INTERNAL MEDICINE

## 2021-06-29 PROCEDURE — 93325 DOPPLER ECHO COLOR FLOW MAPG: CPT

## 2021-06-29 PROCEDURE — 83615 LACTATE (LD) (LDH) ENZYME: CPT | Performed by: STUDENT IN AN ORGANIZED HEALTH CARE EDUCATION/TRAINING PROGRAM

## 2021-06-29 RX ORDER — TACROLIMUS 5 MG/1
5 CAPSULE ORAL
Status: DISCONTINUED | OUTPATIENT
Start: 2021-06-29 | End: 2021-07-05 | Stop reason: HOSPADM

## 2021-06-29 RX ORDER — NICOTINE POLACRILEX 4 MG
15-30 LOZENGE BUCCAL
Status: DISCONTINUED | OUTPATIENT
Start: 2021-06-29 | End: 2021-07-05 | Stop reason: HOSPADM

## 2021-06-29 RX ORDER — MAGNESIUM SULFATE HEPTAHYDRATE 40 MG/ML
2 INJECTION, SOLUTION INTRAVENOUS
Status: COMPLETED | OUTPATIENT
Start: 2021-06-29 | End: 2021-06-29

## 2021-06-29 RX ORDER — NICOTINE POLACRILEX 4 MG
15-30 LOZENGE BUCCAL
Status: DISCONTINUED | OUTPATIENT
Start: 2021-06-29 | End: 2021-06-29

## 2021-06-29 RX ORDER — POLYETHYLENE GLYCOL 3350 17 G/17G
17 POWDER, FOR SOLUTION ORAL DAILY PRN
Status: DISCONTINUED | OUTPATIENT
Start: 2021-06-29 | End: 2021-07-01

## 2021-06-29 RX ORDER — NYSTATIN 100000/ML
1000000 SUSPENSION, ORAL (FINAL DOSE FORM) ORAL 4 TIMES DAILY
Status: DISCONTINUED | OUTPATIENT
Start: 2021-06-29 | End: 2021-07-05 | Stop reason: HOSPADM

## 2021-06-29 RX ORDER — DOXYCYCLINE 100 MG/1
100 CAPSULE ORAL 2 TIMES DAILY
Status: DISCONTINUED | OUTPATIENT
Start: 2021-06-29 | End: 2021-06-29

## 2021-06-29 RX ORDER — PREDNISONE 20 MG/1
20 TABLET ORAL EVERY MORNING
Status: DISCONTINUED | OUTPATIENT
Start: 2021-06-29 | End: 2021-07-05 | Stop reason: HOSPADM

## 2021-06-29 RX ORDER — ROSUVASTATIN CALCIUM 5 MG/1
10 TABLET, COATED ORAL DAILY
Status: DISCONTINUED | OUTPATIENT
Start: 2021-06-29 | End: 2021-07-05 | Stop reason: HOSPADM

## 2021-06-29 RX ORDER — MYCOPHENOLATE MOFETIL 250 MG/1
1000 CAPSULE ORAL 2 TIMES DAILY
Status: DISCONTINUED | OUTPATIENT
Start: 2021-06-29 | End: 2021-07-05 | Stop reason: HOSPADM

## 2021-06-29 RX ORDER — MONTELUKAST SODIUM 10 MG/1
10 TABLET ORAL AT BEDTIME
Status: DISCONTINUED | OUTPATIENT
Start: 2021-06-29 | End: 2021-07-05 | Stop reason: HOSPADM

## 2021-06-29 RX ORDER — GABAPENTIN 300 MG/1
600 CAPSULE ORAL 2 TIMES DAILY
Status: DISCONTINUED | OUTPATIENT
Start: 2021-06-29 | End: 2021-06-30

## 2021-06-29 RX ORDER — DEXTROSE MONOHYDRATE 25 G/50ML
25-50 INJECTION, SOLUTION INTRAVENOUS
Status: DISCONTINUED | OUTPATIENT
Start: 2021-06-29 | End: 2021-06-29

## 2021-06-29 RX ORDER — ALLOPURINOL 300 MG/1
300 TABLET ORAL DAILY
Status: DISCONTINUED | OUTPATIENT
Start: 2021-06-29 | End: 2021-07-05 | Stop reason: HOSPADM

## 2021-06-29 RX ORDER — VALGANCICLOVIR 450 MG/1
450 TABLET, FILM COATED ORAL DAILY
Status: DISCONTINUED | OUTPATIENT
Start: 2021-06-29 | End: 2021-06-30

## 2021-06-29 RX ORDER — BUPROPION HYDROCHLORIDE 75 MG/1
75 TABLET ORAL 2 TIMES DAILY
Status: DISCONTINUED | OUTPATIENT
Start: 2021-06-29 | End: 2021-07-05 | Stop reason: HOSPADM

## 2021-06-29 RX ORDER — MAGNESIUM HYDROXIDE/ALUMINUM HYDROXICE/SIMETHICONE 120; 1200; 1200 MG/30ML; MG/30ML; MG/30ML
30 SUSPENSION ORAL EVERY 4 HOURS PRN
Status: DISCONTINUED | OUTPATIENT
Start: 2021-06-29 | End: 2021-07-05 | Stop reason: HOSPADM

## 2021-06-29 RX ORDER — LIDOCAINE 40 MG/G
CREAM TOPICAL
Status: DISCONTINUED | OUTPATIENT
Start: 2021-06-29 | End: 2021-07-05 | Stop reason: HOSPADM

## 2021-06-29 RX ORDER — LEVALBUTEROL INHALATION SOLUTION 0.31 MG/3ML
0.31 SOLUTION RESPIRATORY (INHALATION) EVERY 6 HOURS PRN
Status: DISCONTINUED | OUTPATIENT
Start: 2021-06-29 | End: 2021-06-30

## 2021-06-29 RX ORDER — ACETAMINOPHEN 325 MG/1
650 TABLET ORAL EVERY 4 HOURS PRN
Status: DISCONTINUED | OUTPATIENT
Start: 2021-06-29 | End: 2021-07-02

## 2021-06-29 RX ORDER — DEXTROSE MONOHYDRATE 25 G/50ML
25-50 INJECTION, SOLUTION INTRAVENOUS
Status: DISCONTINUED | OUTPATIENT
Start: 2021-06-29 | End: 2021-07-05 | Stop reason: HOSPADM

## 2021-06-29 RX ORDER — ASPIRIN 81 MG/1
81 TABLET, CHEWABLE ORAL DAILY
Status: DISCONTINUED | OUTPATIENT
Start: 2021-06-29 | End: 2021-07-05 | Stop reason: HOSPADM

## 2021-06-29 RX ORDER — PANTOPRAZOLE SODIUM 40 MG/1
40 TABLET, DELAYED RELEASE ORAL 2 TIMES DAILY
Status: DISCONTINUED | OUTPATIENT
Start: 2021-06-29 | End: 2021-07-05 | Stop reason: HOSPADM

## 2021-06-29 RX ORDER — HEPARIN SODIUM 5000 [USP'U]/.5ML
5000 INJECTION, SOLUTION INTRAVENOUS; SUBCUTANEOUS EVERY 12 HOURS
Status: DISCONTINUED | OUTPATIENT
Start: 2021-06-29 | End: 2021-06-29

## 2021-06-29 RX ORDER — CYCLOBENZAPRINE HCL 5 MG
5 TABLET ORAL 3 TIMES DAILY PRN
Status: DISCONTINUED | OUTPATIENT
Start: 2021-06-29 | End: 2021-07-05 | Stop reason: HOSPADM

## 2021-06-29 RX ORDER — INSULIN LISPRO 100 [IU]/ML
INJECTION, SOLUTION INTRAVENOUS; SUBCUTANEOUS
Status: ON HOLD | COMMUNITY
Start: 2021-05-31 | End: 2021-07-05

## 2021-06-29 RX ORDER — TACROLIMUS 1 MG/1
4 CAPSULE ORAL
Status: DISCONTINUED | OUTPATIENT
Start: 2021-06-29 | End: 2021-06-29

## 2021-06-29 RX ORDER — SULFAMETHOXAZOLE AND TRIMETHOPRIM 400; 80 MG/1; MG/1
1 TABLET ORAL
Status: DISCONTINUED | OUTPATIENT
Start: 2021-06-29 | End: 2021-06-29

## 2021-06-29 RX ORDER — SULFAMETHOXAZOLE AND TRIMETHOPRIM 400; 80 MG/1; MG/1
1 TABLET ORAL DAILY
Status: DISCONTINUED | OUTPATIENT
Start: 2021-06-30 | End: 2021-06-30

## 2021-06-29 RX ORDER — TACROLIMUS 5 MG/1
5 CAPSULE ORAL
Status: DISCONTINUED | OUTPATIENT
Start: 2021-06-29 | End: 2021-07-04

## 2021-06-29 RX ORDER — IPRATROPIUM BROMIDE AND ALBUTEROL SULFATE 2.5; .5 MG/3ML; MG/3ML
3 SOLUTION RESPIRATORY (INHALATION) ONCE
Status: COMPLETED | OUTPATIENT
Start: 2021-06-29 | End: 2021-06-29

## 2021-06-29 RX ORDER — PROCHLORPERAZINE 25 MG/1
SUPPOSITORY RECTAL
COMMUNITY
Start: 2021-01-31 | End: 2022-03-18

## 2021-06-29 RX ORDER — GABAPENTIN 300 MG/1
300 CAPSULE ORAL EVERY MORNING
Status: DISCONTINUED | OUTPATIENT
Start: 2021-06-29 | End: 2021-07-05 | Stop reason: HOSPADM

## 2021-06-29 RX ADMIN — PANTOPRAZOLE SODIUM 40 MG: 40 TABLET, DELAYED RELEASE ORAL at 08:59

## 2021-06-29 RX ADMIN — NYSTATIN 1000000 UNITS: 500000 SUSPENSION ORAL at 09:15

## 2021-06-29 RX ADMIN — PREDNISONE 20 MG: 20 TABLET ORAL at 08:58

## 2021-06-29 RX ADMIN — AZITHROMYCIN MONOHYDRATE 500 MG: 500 INJECTION, POWDER, LYOPHILIZED, FOR SOLUTION INTRAVENOUS at 14:34

## 2021-06-29 RX ADMIN — CEFEPIME 2 G: 2 INJECTION, POWDER, FOR SOLUTION INTRAVENOUS at 12:00

## 2021-06-29 RX ADMIN — SODIUM CHLORIDE, POTASSIUM CHLORIDE, SODIUM LACTATE AND CALCIUM CHLORIDE 500 ML: 600; 310; 30; 20 INJECTION, SOLUTION INTRAVENOUS at 05:51

## 2021-06-29 RX ADMIN — ACETAMINOPHEN 650 MG: 325 TABLET, FILM COATED ORAL at 03:56

## 2021-06-29 RX ADMIN — GABAPENTIN 600 MG: 300 CAPSULE ORAL at 21:18

## 2021-06-29 RX ADMIN — DOXYCYCLINE HYCLATE 100 MG: 100 CAPSULE ORAL at 08:58

## 2021-06-29 RX ADMIN — ASPIRIN 81 MG CHEWABLE TABLET 81 MG: 81 TABLET CHEWABLE at 08:59

## 2021-06-29 RX ADMIN — INSULIN ASPART 9 UNITS: 100 INJECTION, SOLUTION INTRAVENOUS; SUBCUTANEOUS at 09:24

## 2021-06-29 RX ADMIN — TACROLIMUS 5 MG: 5 CAPSULE ORAL at 09:15

## 2021-06-29 RX ADMIN — MAGNESIUM SULFATE HEPTAHYDRATE 2 G: 40 INJECTION, SOLUTION INTRAVENOUS at 09:01

## 2021-06-29 RX ADMIN — MYCOPHENOLATE MOFETIL 1000 MG: 250 CAPSULE ORAL at 08:59

## 2021-06-29 RX ADMIN — FLUTICASONE FUROATE AND VILANTEROL TRIFENATATE 1 PUFF: 100; 25 POWDER RESPIRATORY (INHALATION) at 10:59

## 2021-06-29 RX ADMIN — SODIUM CHLORIDE, POTASSIUM CHLORIDE, SODIUM LACTATE AND CALCIUM CHLORIDE 1000 ML: 600; 310; 30; 20 INJECTION, SOLUTION INTRAVENOUS at 03:56

## 2021-06-29 RX ADMIN — Medication 1 TABLET: at 21:17

## 2021-06-29 RX ADMIN — NYSTATIN 1000000 UNITS: 500000 SUSPENSION ORAL at 21:19

## 2021-06-29 RX ADMIN — SODIUM CHLORIDE 500 ML: 9 INJECTION, SOLUTION INTRAVENOUS at 12:17

## 2021-06-29 RX ADMIN — Medication 37.5 MG: at 05:49

## 2021-06-29 RX ADMIN — ALLOPURINOL 300 MG: 300 TABLET ORAL at 08:59

## 2021-06-29 RX ADMIN — ROSUVASTATIN CALCIUM 10 MG: 5 TABLET, FILM COATED ORAL at 08:58

## 2021-06-29 RX ADMIN — MONTELUKAST 10 MG: 10 TABLET, FILM COATED ORAL at 05:49

## 2021-06-29 RX ADMIN — VALGANCICLOVIR 450 MG: 450 TABLET, FILM COATED ORAL at 08:59

## 2021-06-29 RX ADMIN — BUPROPION HYDROCHLORIDE 75 MG: 75 TABLET, FILM COATED ORAL at 21:17

## 2021-06-29 RX ADMIN — IPRATROPIUM BROMIDE AND ALBUTEROL SULFATE 3 ML: .5; 3 SOLUTION RESPIRATORY (INHALATION) at 04:27

## 2021-06-29 RX ADMIN — Medication 1 TABLET: at 10:57

## 2021-06-29 RX ADMIN — GABAPENTIN 600 MG: 300 CAPSULE ORAL at 12:00

## 2021-06-29 RX ADMIN — VANCOMYCIN HYDROCHLORIDE 2000 MG: 1 INJECTION, POWDER, LYOPHILIZED, FOR SOLUTION INTRAVENOUS at 00:11

## 2021-06-29 RX ADMIN — GABAPENTIN 300 MG: 300 CAPSULE ORAL at 08:58

## 2021-06-29 RX ADMIN — PANTOPRAZOLE SODIUM 40 MG: 40 TABLET, DELAYED RELEASE ORAL at 21:20

## 2021-06-29 RX ADMIN — MYCOPHENOLATE MOFETIL 1000 MG: 250 CAPSULE ORAL at 21:18

## 2021-06-29 RX ADMIN — TACROLIMUS 5 MG: 5 CAPSULE ORAL at 18:18

## 2021-06-29 RX ADMIN — INSULIN GLARGINE 20 UNITS: 100 INJECTION, SOLUTION SUBCUTANEOUS at 10:58

## 2021-06-29 RX ADMIN — UMECLIDINIUM 1 PUFF: 62.5 AEROSOL, POWDER ORAL at 10:58

## 2021-06-29 RX ADMIN — BUPROPION HYDROCHLORIDE 75 MG: 75 TABLET, FILM COATED ORAL at 10:57

## 2021-06-29 RX ADMIN — SULFAMETHOXAZOLE AND TRIMETHOPRIM 1 TABLET: 400; 80 TABLET ORAL at 08:59

## 2021-06-29 RX ADMIN — NYSTATIN 1000000 UNITS: 500000 SUSPENSION ORAL at 15:47

## 2021-06-29 RX ADMIN — MAGNESIUM SULFATE HEPTAHYDRATE 2 G: 40 INJECTION, SOLUTION INTRAVENOUS at 10:58

## 2021-06-29 RX ADMIN — NYSTATIN 1000000 UNITS: 500000 SUSPENSION ORAL at 12:04

## 2021-06-29 NOTE — CONSULTS
Procedure Note    Attending: Corey Delgadillo MD   Procedure: Right Thoracentesis  Indication: Evaluation of right pleural effusion, concern for infection  Risk Assessment: Average  Pre-procedure diagnosis: Right pleural effusion  Post-procedure diagnosis: Right pleural effusion    The risks and benefits of the procedure were explained to the patient who expressed understanding and opted to proceed.  Consent was obtained and placed in the chart and the patient was placed on pulse oximetry.  A time out was performed.    An ultrasound probe was used to identify an area of pleural fluid in the right hemithorax.  This area was prepped and draped in the usual sterile fashion.  7 ml of 1% lidocaine was instilled and fluid located using ultrasound guidance.  A small incision was made with an 11 blade.  The thoracentesis catheter and needle were inserted above the rib until fluid obtained then the needle removed.    Using a one-way valve, 270 ml of cloudy red/bloody colored fluid was removed (see below, left is the initial aspirated fluid and right is the final - appear to be same color). A specimen was sent for analysis.    The catheter was removed with the patient exhaling and an occlusive dressing placed.       Ultrasound revealed normal lung sliding after the procedure and a chest radiograph is pending.    The tolerated the procedure well, but did need some assistance to stay in a seated position. Cards cross [place] provider on [id number] contacted and discussed procedure and recommended close monitoring overnight with follow up of CXR and trending of hemoglobin given bloody fluid.  Please contact the Consult and Procedure Service if any concerns or complications arise.       Corey Delgadillo MD     DOS:  06/29/21

## 2021-06-29 NOTE — H&P
Cardiology History and Physical  Jim Willingham MRN: [id number]  Age: 66 year old, : 1955  Primary care provider: Ricardo Gómez            Assessment and Plan:     Jim Willingham is a 66 year old male with history of NICM s/p OHT (21) postoperative course was complicated by right pleural air leak with prolonged chest tube, COPD, CKDIII, DMII, anemia who presents with SOB, cough, fevers/chills, fatigue found to have sepsis likely secondary to CAP    Sepsis likely secondary to CAP  Chronic immunosupression  Moderate pleural effusion  Prior right pleural air leak s/p prolonged chest tube  Presenting with 2 day of non-productive cough, SOB, fevers/chills, fatigue with + sick contacts at home (wife with URI). Febrile (102.7), tachycardic (120s-150s), new RLL consolidation with moderate effusion. Pan CT unrevealing for alternative source. Will cover for pseudomonas and MRSA given immunosuppressed status as well as atypical. Suspect possible post-viral pneumonia. Low suspicion for COPD exacerbation. COVID negative. Fungal unlikely given acuity and will defer empiric coverage.  - start cefepime/vanc/doxy  - await blood cultures  - sputum culture/GS ordered  - RVP  - strep/legionella urinary antigen  - MRSA nares  - UA/UC  - EBV/CMV titers  - consider thoracentesis and/or bronchoscopy  - transplant ID consult    Sinus Tachycardia  NICM s/p OHT (21)  LAST BIOPSY (21):  - Acute cellular rejection: No rejection, Grade 0R (ISHLT 2004)   - Antibody-mediated rejection: No morphologic evidence of antibody-mediated rejection   - C3d and C4d are negative    LAST ANGIOGRAM: deferred in setting of elevated Cr    Volume status/diuetics: hypovolemic, hold PTA bumex, LR bolus 1L, consider additional liter to reach 30 ml/kg given sepsis  Immunosuppression:  - continue prednisone 20 mg daily  - continue cellcept 100 mg BID  - continue tacrolimus 5 mg QAM, 4 mg QPM, level ordered, goal 10-12    Serostatus: CMV:  D+/R+, EBV: D+/R+, Toxo D-/R-  Prophylaxis: Bactrim, Nystatin and Valcyte    - continue PTA ASA 81 mg  - continue PTA rosuvastatin 10 mg daily  - ECG ordered  - TTE in AM     CKD stage III  Cr 1.85 from recent baseline ~2.  - trend BMP     DMII  - PTA insulin regimen     Acute blood loss anemia and thrombocytopenia  Hgb stable, Plts WNL, no signs or symptoms of active bleeding    Depression  - PTA bupropion and amitriptyline    Hospital Checklist:  Diet: Cardiac  DVT ppx: heparin  LTD: PIVs  Code: FULL  Dispo: Cards      To be staffed in AM.    Renaldo Lawrence MD  Internal Medicine, PGY-2  [facility], [place]  Pager: [phone]      I have reviewed patient's vital signs, notes, medications, labs and imaging. I have also seen and examined the patient.  Please see progress note dated 6/29/21 for updated findings and plan.    Rose Conte MD  Section Head - Advanced Heart Failure, Transplantation and Mechanical Circulatory Support  Director - [facility]  Associate Professor of Medicine, [facility]              Chief Complaint:     SOB, cough, fevers/chills, fatigue          History of Present Illness:     Jim Willingham is a 66 year old male with history of NICM s/p OHT (5/6/21) postoperative course was complicated by right pleural air leak with prolonged chest tube, COPD, CKDIII, DMII, anemia who presents with SOB, cough, fevers/chills, fatigue.    Patient reports approximately 2 days of nonproductive cough, rhinorrhea, fevers and chills, progressive dyspnea now at rest, generalized fatigue, poor p.o. intake, anorexia and intermittent dizziness and generalized dull headache.  Reports recent sick contacts of wife and one other family member at home who had a upper respiratory illness.  Prior to the last 2 days he has felt well overall, working more with PT and increasing his activity level.  He reports that his weight is been around  190 pounds which is lower than usual for him.  He denies any recent travel, night sweats, denies nausea or vomiting, diarrhea or abdominal pain, notes chronic anosmia, denies dysuria, denies wounds, rashes, lesions, joint pain.    ROS:   12-pt ROS otherwise negative except as noted above          Past Medical History:     Past Medical History:   Diagnosis Date     Bariatric surgery status 2003     Benign essential hypertension 05/11/2017     Bilateral carpal tunnel syndrome 11/02/2020     Cardiomyopathy, unspecified (H) 05/08/2017     CKD (chronic kidney disease) stage 3, GFR 30-59 ml/min 05/11/2017     COPD (chronic obstructive pulmonary disease) (H) 11/02/2020     Depression 05/11/2017     Diabetes mellitus (H) 1995     Gouty arthropathy, chronic, without tophi 11/02/2020     H/O gastric bypass 05/11/2017     ICD (implantable cardioverter-defibrillator), biventricular, in situ 05/11/2017     LVAD (left ventricular assist device) present (H)      Major depression, recurrent, chronic (H) 11/02/2020     NICM (nonischemic cardiomyopathy) (H)/ EF 20% 05/11/2017    ECHO: LVEDd. 7.66 cm, Restrictive pattern , Severe mitral valve regurgitation     CECILIA (obstructive sleep apnea) 05/11/2017     Paroxysmal atrial fibrillation (H) 05/11/2017     Paroxysmal VT (H) 05/11/2017     Rotator cuff tear arthropathy of both shoulders 11/02/2020     Uncomplicated asthma      Vitamin B12 deficiency (non anemic) 05/11/2017              Past Surgical History:      Past Surgical History:   Procedure Laterality Date     ANESTHESIA CARDIOVERSION N/A 05/11/2020    Procedure: ANESTHESIA, FOR CARDIOVERSION @1100;  Surgeon: GENERIC ANESTHESIA PROVIDER;  Location: [facility] OR     CV HEART BIOPSY N/A 5/13/2021    Procedure: Heart Biopsy;  Surgeon: Scout Robins MD;  Location:  HEART CARDIAC CATH LAB     CV HEART BIOPSY N/A 5/20/2021    Procedure: Heart Biopsy;  Surgeon: Jeffrey Gibson MD;  Location:  HEART CARDIAC CATH LAB      CV HEART BIOPSY N/A 5/27/2021    Procedure: Heart Biopsy;  Surgeon: Jac Dover MD;  Location:  HEART CARDIAC CATH LAB     CV HEART BIOPSY N/A 6/7/2021    Procedure: CV HEART BIOPSY;  Surgeon: Jeffrey Gibson MD;  Location:  HEART CARDIAC CATH LAB     CV RIGHT HEART CATH MEASUREMENTS RECORDED N/A 07/24/2019    Procedure: CV RIGHT HEART CATH;  Surgeon: Renu Sears MD;  Location:  HEART CARDIAC CATH LAB     CV RIGHT HEART CATH MEASUREMENTS RECORDED N/A 08/05/2020    Procedure: CV RIGHT HEART CATH;  Surgeon: Nicola Seth MD;  Location:  HEART CARDIAC CATH LAB     CV RIGHT HEART CATH MEASUREMENTS RECORDED N/A 01/07/2021    Procedure: CV RIGHT HEART CATH;  Surgeon: Jac Dover MD;  Location:  HEART CARDIAC CATH LAB     CV RIGHT HEART CATH MEASUREMENTS RECORDED N/A 02/23/2021    Procedure: Heart Cath Right Heart Cath;  Surgeon: Jeffrey Gibson MD;  Location:  HEART CARDIAC CATH LAB     CV RIGHT HEART CATH MEASUREMENTS RECORDED N/A 03/23/2021    Procedure: Heart Cath Right Heart Cath. request for 3/23;  Surgeon: Jeffrey Gibson MD;  Location:  HEART CARDIAC CATH LAB     CV RIGHT HEART CATH MEASUREMENTS RECORDED N/A 5/13/2021    Procedure: Right Heart Cath;  Surgeon: Scout Robins MD;  Location:  HEART CARDIAC CATH LAB     CV RIGHT HEART CATH MEASUREMENTS RECORDED N/A 5/20/2021    Procedure: Right Heart Cath;  Surgeon: Jeffrey Gibson MD;  Location:  HEART CARDIAC CATH LAB     CV RIGHT HEART CATH MEASUREMENTS RECORDED N/A 5/27/2021    Procedure: Right Heart Cath;  Surgeon: Jac Dover MD;  Location:  HEART CARDIAC CATH LAB     CV RIGHT HEART CATH MEASUREMENTS RECORDED N/A 6/7/2021    Procedure: CV RIGHT HEART CATH;  Surgeon: Jeffrey Gibson MD;  Location:  HEART CARDIAC CATH LAB     GI SURGERY  2003    [facility]     INSERT VENTRICULAR ASSIST DEVICE LEFT (HEARTMATE II) N/A  2017    Procedure: INSERT VENTRICULAR ASSIST DEVICE LEFT (HEARTMATE II);  Median Sternotomy Heartmate II Left Ventricular Assist Device Insertion on Pump Oxygenator;  Surgeon: Ronnie Quigley MD;  Location:  OR     ORTHOPEDIC SURGERY      right knee wired     PICC DOUBLE LUMEN PLACEMENT Right 2020    5FR PICC DL. Length-43cm (1cm out).     PICC INSERTION - DOUBLE LUMEN Right 2021    IK/BRACH     RELEASE CARPAL TUNNEL BILATERAL Bilateral 2021    Procedure: Bilateral carpal tunnel release;  Surgeon: Jermaine Brand MD;  Location:  OR     TRANSPLANT HEART RECIPIENT N/A 2021    Procedure: Redo median sternotomy, lysis of adhesions, heart transplant recipient, on cardiopulmonary bypass, intraoperative transesophageal echocardiogram per anesthesia, Implantable Cardioverter Defibrillator (ICD) removal;  Surgeon: Ronnie Quigley MD;  Location:  OR              Social History:     Social History     Socioeconomic History     Marital status:      Spouse name: Not on file     Number of children: Not on file     Years of education: Not on file     Highest education level: Not on file   Occupational History     Not on file   Social Needs     Financial resource strain: Not on file     Food insecurity     Worry: Not on file     Inability: Not on file     Transportation needs     Medical: Not on file     Non-medical: Not on file   Tobacco Use     Smoking status: Former Smoker     Quit date:      Years since quittin.5     Smokeless tobacco: Never Used   Substance and Sexual Activity     Alcohol use: No     Drug use: No     Sexual activity: Yes     Partners: Female   Lifestyle     Physical activity     Days per week: Not on file     Minutes per session: Not on file     Stress: Not on file   Relationships     Social connections     Talks on phone: Not on file     Gets together: Not on file     Attends Denominational service: Not on file     Active member of club or organization: Not on  file     Attends meetings of clubs or organizations: Not on file     Relationship status: Not on file     Intimate partner violence     Fear of current or ex partner: Not on file     Emotionally abused: Not on file     Physically abused: Not on file     Forced sexual activity: Not on file   Other Topics Concern     Parent/sibling w/ CABG, MI or angioplasty before 65F 55M? No   Social History Narrative     Not on file              Family History:     Family History   Problem Relation Age of Onset     Cerebrovascular Disease Mother 64     Diabetes Mother      Hypertension Mother      Coronary Artery Disease Father      Diabetes Type 2  Father      Obesity Brother      Obesity Brother      Cerebrovascular Disease Daughter 40     Family history reviewed and updated in EPIC          Allergies:     Allergies   Allergen Reactions     Beer Shortness Of Breath     Grass Shortness Of Breath     Ace Inhibitors Cough     Dust Mites Other (See Comments)     Asthma     Mold Other (See Comments)     Asthma     Penicillins Other (See Comments)     Unknown - childhood exposure    Tolerated Zosyn 9/18-9/20/2020     Sulfa Drugs Other (See Comments) and Unknown     Unknown childhood reaction              Medications:     Prior to Admission Medications   Prescriptions Last Dose Informant Patient Reported? Taking?   ACE/ARB/ARNI NOT PRESCRIBED (INTENTIONAL)   Yes No   Sig: ACE & ARB not prescribed due to }   Fluticasone-Umeclidin-Vilanterol (TRELEGY ELLIPTA) 100-62.5-25 MCG/INH oral inhaler   No No   Sig: Inhale 1 puff into the lungs daily   Melatonin 10 MG CAPS   Yes No   Sig: Take 1 capsule by mouth At Bedtime   acetaminophen (TYLENOL) 325 MG tablet   No No   Sig: Take 2 tablets (650 mg) by mouth every 4 hours as needed for other (For optimal non-opioid multimodal pain management to improve pain control.)   albuterol (PROAIR HFA) 108 (90 Base) MCG/ACT inhaler   No No   Sig: Inhale 2 puffs into the lungs every 4 hours as needed for  shortness of breath / dyspnea or wheezing   allopurinol (ZYLOPRIM) 300 MG tablet   No No   Sig: Take 1 tablet (300 mg) by mouth daily   amitriptyline (ELAVIL) 25 MG tablet   No No   Sig: Take 1.5 tablets (37.5 mg) by mouth At Bedtime   aspirin (ASA) 81 MG chewable tablet   No No   Sig: Take 1 tablet (81 mg) by mouth daily   blood glucose VI test strip   No No   Sig: Use to test blood sugar four times daily or as directed.   blood glucose monitoring (ONE TOUCH ULTRA 2) meter device kit   No No   Sig: Use to test blood sugar four times daily or as directed.   buPROPion (WELLBUTRIN) 100 MG tablet   No No   Si.5 tablets (50 mg) by Oral or Feeding Tube route every evening   buPROPion (WELLBUTRIN) 75 MG tablet   No No   Sig: Take 1 tablet (75 mg) by mouth 2 times daily   bumetanide (BUMEX) 1 MG tablet   No No   Sig: Take 2 tablets (2 mg) by mouth daily   calcium citrate-vitamin D (CITRACAL) 315-250 MG-UNIT TABS per tablet   No No   Sig: Take 1 tablet by mouth 2 times daily   cyanocobalamin (VITAMIN B12) 1000 MCG/ML injection  Self Yes No   Sig: Inject 1,000 mcg into the muscle every 30 days   cyclobenzaprine (FLEXERIL) 5 MG tablet   Yes No   Sig: Take 5 mg by mouth 3 times daily as needed for muscle spasms   gabapentin (NEURONTIN) 300 MG capsule   No No   Sig: Take 1 capsule (300 mg) by mouth every morning   gabapentin (NEURONTIN) 300 MG capsule   No No   Sig: Take 2 capsules (600 mg) by mouth 2 times daily At 12pm and 8pm   insulin aspart (NOVOLOG PEN) 100 UNIT/ML pen   No No   Sig: Inject 1-10 Units Subcutaneous 3 times daily (before meals) Correction Scale - HIGH INSULIN RESISTANCE DOSING     Do Not give Correction Insulin if Pre-Meal BG less than 140.   For Pre-Meal  - 164 give 1 unit.   For Pre-Meal  - 189 give 2 units.   For Pre-Meal  - 214 give 3 units.   For Pre-Meal  - 239 give 4 units.   For Pre-Meal  - 264 give 5 units.   For Pre-Meal  - 289 give 6 units.   For Pre-Meal   - 314 give 7 units.   For Pre-Meal  - 339 give 8 units.   For Pre-Meal  - 364 give 9 units.   For Pre-Meal BG greater than or equal to 365 give 10 units  To be given with prandial insulin, and based on pre-meal blood glucose.   Notify provider if glucose greater than or equal to 350 mg/dL after administration of correction dose. If given at mealtime, administer within 30 minutes of start of meal   insulin aspart (NOVOLOG PEN) 100 UNIT/ML pen   No No   Sig: Inject 1-7 Units Subcutaneous At Bedtime HIGH INSULIN RESISTANCE DOSING    Do Not give Bedtime Correction Insulin if BG less than 200.   For  - 224 give 1 units.   For  - 249 give 2 units.   For  - 274 give 3 units.   For  - 299 give 4 units.   For  - 324 give 5 units.   For  - 349 give 6 units.   For BG greater than or equal to 350 give 7 units.   Notify provider if glucose greater than or equal to 350 mg/dL after administration of correction dose. If given at mealtime, administer within 30 minutes of start of meal   insulin glargine (LANTUS PEN) 100 UNIT/ML pen   No No   Sig: Inject 20 Units Subcutaneous every morning (before breakfast)   insulin pen needle (32G X 4 MM) 32G X 4 MM miscellaneous   No No   Sig: Use four pen needles daily or as directed.   montelukast (SINGULAIR) 10 MG tablet   No No   Sig: Take 1 tablet (10 mg) by mouth At Bedtime   mycophenolate (GENERIC EQUIVALENT) 250 MG capsule   No No   Sig: Take 4 capsules (1,000 mg) by mouth 2 times daily   nystatin (MYCOSTATIN) 362532 UNIT/ML suspension   No No   Sig: Swish and swallow 10 mLs (1,000,000 Units) in mouth 4 times daily   pantoprazole (PROTONIX) 40 MG EC tablet   No No   Sig: Take 1 tablet (40 mg) by mouth 2 times daily   polyethylene glycol (MIRALAX) 17 GM/Dose powder   Yes No   Sig: Take 1 capful by mouth daily as needed for constipation   potassium chloride ER (KLOR-CON M) 20 MEQ CR tablet   No No   Sig: Take 1 tablet (20 mEq) by mouth  2 times daily   predniSONE (DELTASONE) 10 MG tablet   No No   Sig: Take 2 tablets (20 mg) by mouth every morning   rosuvastatin (CRESTOR) 10 MG tablet   No No   Sig: Take 1 tablet (10 mg) by mouth daily   senna-docusate (SENOKOT-S/PERICOLACE) 8.6-50 MG tablet   No No   Sig: Take 2 tablets by mouth daily as needed for constipation   Patient not taking: Reported on 6/10/2021   sulfamethoxazole-trimethoprim (BACTRIM) 400-80 MG tablet   No No   Sig: Take 1 tablet by mouth every 48 hours   tacrolimus (GENERIC EQUIVALENT) 1 MG capsule   No No   Sig: Take 5 capsules (5 mg) by mouth every morning AND 4 capsules (4 mg) every evening.   traMADol (ULTRAM) 50 MG tablet   No No   Sig: Take 0.5-1 tablets (25-50 mg) by mouth every 6 hours as needed for moderate pain   valGANciclovir (VALCYTE) 450 MG tablet   No No   Sig: Take 1 tablet (450 mg) by mouth daily      Facility-Administered Medications: None                    Physical Exam:     B/P: 147/99, T: 100.4, P: 123, R: 20    Wt Readings from Last 4 Encounters:   06/28/21 89.9 kg (198 lb 1.6 oz)   06/21/21 87.2 kg (192 lb 3.2 oz)   06/07/21 88.8 kg (195 lb 12.8 oz)   05/31/21 94.4 kg (208 lb 3.2 oz)       No intake or output data in the 24 hours ending 06/28/21 8985    Constitutional: awake, alert, cooperative, no apparent distress  HENT: PERRL, EOM grossly intact, sclera anicteric, dryt mucous membranes  Respiratory: non-labored respirations on 1L NC, decreased bibasilar breath sounds R>L, no wheezes or crackles, intermittent dry cough  Cardiovascular: tachycardic regular rhythm, normal S1 and S2, no S3 or S4, and no murmur noted, no LE edema, JVP ~4  GI: soft, non-distended, non-tender  Skin: warm and dry, no rashes or lesions, cap refill >2 seconds with skin tenting  Neurologic: Cranial nerves II-XII are grossly intact, moving all extremities equally and independently          Data:       BMP  Recent Labs   Lab 06/28/21  2208 06/25/21  1133 06/24/21  0905   * 134 134    POTASSIUM 5.3 4.5 4.3   CHLORIDE 94 101 102   CO2 25 25 23   BUN 39* 48* 41*   CR 1.85* 2.07* 2.23*   * 314* 320*   QAMAR 8.6 8.5 8.0*   PHOS  --   --  3.5     CBC  Recent Labs   Lab 06/28/21 2208 06/25/21  1133 06/24/21  0905   WBC 5.1 4.1 4.4   HGB 11.3* 10.4* 10.7*   HCT 35.7* 32.6* 34.1*   MCV 93 91 93    211 202   LYMPH  --   --  33.0     INRNo lab results found in last 7 days.  LFTs  Recent Labs   Lab 06/28/21 2208 06/24/21  0905   ALKPHOS 268* 197*   AST 38 31   ALT 70 40   BILITOTAL 0.5 0.3   PROTTOTAL 6.5* 5.8*   ALBUMIN 3.2* 2.9*      INFNo lab results found in last 7 days.    Results for orders placed or performed during the hospital encounter of 06/28/21   XR Chest Port 1 View    Narrative    EXAM: CHEST SINGLE VIEW PORTABLE  LOCATION: [facility]  DATE/TIME: 6/28/2021 10:56 PM    INDICATION: Fever.  COMPARISON: 06/21/2021      Impression    IMPRESSION:   1. A small to moderate size region of patchy opacities in the right lung base, new. This is nonspecific and could represent pneumonia or atelectasis.  2. Blunting of the right costophrenic angle again noted and suggestive of a small to moderate-sized right pleural effusion.  3. No other significant interval change since the relatively recent comparison study.    CT Chest Abdomen Pelvis w/o Contrast    Narrative    EXAM: CT CHEST, ABDOMEN AND PELVIS WITHOUT CONTRAST  LOCATION: [facility]  DATE/TIME: 6/29/2021 12:18 AM    INDICATION: Sepsis.  COMPARISON: 05/26/2021.    TECHNIQUE: CT scan of the chest, abdomen, and pelvis was performed without IV contrast. Multiplanar reformats were obtained. Dose reduction techniques were used.  CONTRAST: None.    FINDINGS:    LUNGS AND PLEURA: A 3.8 x 2.6 cm patchy opacity is present in the central aspect of the right lower lobe (series 2 image 150), new. The lungs are otherwise clear. Moderate sized right pleural effusion, increased in size. No left pleural  effusion.    MEDIASTINUM/AXILLAE: Prior median sternotomy. A fragment of a cardiac lead is again noted coursing from the left subclavian vein to the superior vena cava. Atherosclerotic calcification in the aorta.    CORONARY ARTERY CALCIFICATION: Absent.    HEPATOBILIARY: A few small gallstones in the gallbladder.    SPLEEN: Unremarkable.    PANCREAS: Unremarkable.    ADRENAL GLANDS: Unremarkable.    KIDNEYS/BLADDER: Unremarkable.    BOWEL: Prior gastric surgery. The appendix is not visualized.    LYMPH NODES: Unremarkable.    PELVIC ORGANS: No acute findings.    MUSCULOSKELETAL: No acute findings.    OTHER: None.      Impression    IMPRESSION:   1. A small patchy opacity in the central aspect of the right lower lobe, new since 05/26/2021. This most likely represents pneumonia. Several additional patchy opacities that had been present within the lungs on the relatively recent comparison study   have resolved.  2. Moderate-sized right pleural effusion, increased in size since comparison study. A very small left pleural effusion that had been present on the comparison study has resolved.  3. No acute abnormality identified in the abdomen or pelvis.      *Note: Due to a large number of results and/or encounters for the requested time period, some results have not been displayed. A complete set of results can be found in Results Review.

## 2021-06-29 NOTE — ED PROVIDER NOTES
ED Provider Note  [facility]      History     Chief Complaint   Patient presents with     Fever     HPI  Jim Willingham is a 66 year old male with a medical history significant for NICM complicated by VT s/p CRT-D s/p HM II LVAD (6/19/2017) with subsequent OHT (5/6/2021) who presents to the Emergency Department via EMS for evaluation of fevers, chills and generalized weakness for the last couple of days.  He denies any cough, abdominal pain, nausea, vomiting, blood in the stool or rash.  He notes that he has not had a significant appetite over the last couple of days.  He denies any recent weight gain or leg swelling.  Patient reports that he has not yet gotten his COVID-19 vaccine as he cannot get it until he is 3 months post transplant.    Past Medical History  Past Medical History:   Diagnosis Date     Bariatric surgery status 2003     Benign essential hypertension 05/11/2017     Bilateral carpal tunnel syndrome 11/02/2020     Cardiomyopathy, unspecified (H) 05/08/2017     CKD (chronic kidney disease) stage 3, GFR 30-59 ml/min 05/11/2017     COPD (chronic obstructive pulmonary disease) (H) 11/02/2020     Depression 05/11/2017     Diabetes mellitus (H) 1995     Gouty arthropathy, chronic, without tophi 11/02/2020     H/O gastric bypass 05/11/2017     ICD (implantable cardioverter-defibrillator), biventricular, in situ 05/11/2017     LVAD (left ventricular assist device) present (H)      Major depression, recurrent, chronic (H) 11/02/2020     NICM (nonischemic cardiomyopathy) (H)/ EF 20% 05/11/2017    ECHO: LVEDd. 7.66 cm, Restrictive pattern , Severe mitral valve regurgitation     CECILIA (obstructive sleep apnea) 05/11/2017     Paroxysmal atrial fibrillation (H) 05/11/2017     Paroxysmal VT (H) 05/11/2017     Rotator cuff tear arthropathy of both shoulders 11/02/2020     Uncomplicated asthma      Vitamin B12 deficiency (non anemic) 05/11/2017     Past Surgical History:   Procedure  Laterality Date     ANESTHESIA CARDIOVERSION N/A 05/11/2020    Procedure: ANESTHESIA, FOR CARDIOVERSION @1100;  Surgeon: GENERIC ANESTHESIA PROVIDER;  Location: [facility] OR     CV HEART BIOPSY N/A 5/13/2021    Procedure: Heart Biopsy;  Surgeon: Scout Robins MD;  Location: [facility] HEART CARDIAC CATH LAB     CV HEART BIOPSY N/A 5/20/2021    Procedure: Heart Biopsy;  Surgeon: Jeffrey Gibson MD;  Location: [facility] HEART CARDIAC CATH LAB     CV HEART BIOPSY N/A 5/27/2021    Procedure: Heart Biopsy;  Surgeon: Jac Dover MD;  Location: [facility] HEART CARDIAC CATH LAB     CV HEART BIOPSY N/A 6/7/2021    Procedure: CV HEART BIOPSY;  Surgeon: Jeffrey Gibson MD;  Location: [facility] HEART CARDIAC CATH LAB     CV RIGHT HEART CATH MEASUREMENTS RECORDED N/A 07/24/2019    Procedure: CV RIGHT HEART CATH;  Surgeon: Renu Sears MD;  Location: [facility] HEART CARDIAC CATH LAB     CV RIGHT HEART CATH MEASUREMENTS RECORDED N/A 08/05/2020    Procedure: CV RIGHT HEART CATH;  Surgeon: Nicola Seth MD;  Location: [facility] HEART CARDIAC CATH LAB     CV RIGHT HEART CATH MEASUREMENTS RECORDED N/A 01/07/2021    Procedure: CV RIGHT HEART CATH;  Surgeon: Jac Dover MD;  [facility]     CV RIGHT HEART CATH MEASUREMENTS RECORDED N/A 02/23/2021    Procedure: Heart Cath Right Heart Cath;  Surgeon: Jeffrey Gibson MD;  Location: [facility] HEART CARD[facility]AC CATH LAB     CV RIGHT HEART CATH MEASUREMENTS RECORDED N/A 03/23/2021    Procedure: Heart Cath Right Heart Cath. request for 3/23;  Surgeon: Jeffrey Gibson MD;  [facility]     CV RIGHT HEART CATH MEASUREMENTS RECORDED N/A 5/13/2021    Procedure: Right Heart Cath;  Surgeon: Scout Robins MD;  Location:  HEART CARDIAC CATH LAB     CV RIGHT HEART CATH MEASUREMENTS RECORDED N/A 5/20/2021    Procedure: Right Heart Cath;  Surgeon: Jeffrey Gibson MD;  Location:  HEART CARDIAC CATH  LAB     CV RIGHT HEART CATH MEASUREMENTS RECORDED N/A 5/27/2021    Procedure: Right Heart Cath;  Surgeon: Jac Dover MD;  Location:  HEART CARDIAC CATH LAB     CV RIGHT HEART CATH MEASUREMENTS RECORDED N/A 6/7/2021    Procedure: CV RIGHT HEART CATH;  Surgeon: Jeffrey Gibson MD;  Location:  HEART CARDIAC CATH LAB     GI SURGERY  2003    [facility]     INSERT VENTRICULAR ASSIST DEVICE LEFT (HEARTMATE II) N/A 06/19/2017    Procedure: INSERT VENTRICULAR ASSIST DEVICE LEFT (HEARTMATE II);  Median Sternotomy Heartmate II Left Ventricular Assist Device Insertion on Pump Oxygenator;  Surgeon: Ronnie Quigley MD;  Location:  OR     ORTHOPEDIC SURGERY  1994    right knee wired     PICC DOUBLE LUMEN PLACEMENT Right 09/23/2020    5FR PICC DL. Length-43cm (1cm out).     PICC INSERTION - DOUBLE LUMEN Right 05/09/2021    IK/BRACH     RELEASE CARPAL TUNNEL BILATERAL Bilateral 02/18/2021    Procedure: Bilateral carpal tunnel release;  Surgeon: Jermaine Brand MD;  Location:  OR     TRANSPLANT HEART RECIPIENT N/A 05/05/2021    Procedure: Redo median sternotomy, lysis of adhesions, heart transplant recipient, on cardiopulmonary bypass, intraoperative transesophageal echocardiogram per anesthesia, Implantable Cardioverter Defibrillator (ICD) removal;  Surgeon: Ronnie Quigley MD;  Location:  OR     ACE/ARB/ARNI NOT PRESCRIBED (INTENTIONAL)  acetaminophen (TYLENOL) 325 MG tablet  albuterol (PROAIR HFA) 108 (90 Base) MCG/ACT inhaler  allopurinol (ZYLOPRIM) 300 MG tablet  amitriptyline (ELAVIL) 25 MG tablet  aspirin (ASA) 81 MG chewable tablet  blood glucose monitoring (ONE TOUCH ULTRA 2) meter device kit  blood glucose VI test strip  bumetanide (BUMEX) 1 MG tablet  buPROPion (WELLBUTRIN) 100 MG tablet  buPROPion (WELLBUTRIN) 75 MG tablet  calcium citrate-vitamin D (CITRACAL) 315-250 MG-UNIT TABS per tablet  cyanocobalamin (VITAMIN B12) 1000 MCG/ML injection  cyclobenzaprine (FLEXERIL) 5 MG  tablet  Fluticasone-Umeclidin-Vilanterol (TRELEGY ELLIPTA) 100-62.5-25 MCG/INH oral inhaler  gabapentin (NEURONTIN) 300 MG capsule  gabapentin (NEURONTIN) 300 MG capsule  insulin aspart (NOVOLOG PEN) 100 UNIT/ML pen  insulin aspart (NOVOLOG PEN) 100 UNIT/ML pen  insulin glargine (LANTUS PEN) 100 UNIT/ML pen  insulin pen needle (32G X 4 MM) 32G X 4 MM miscellaneous  Melatonin 10 MG CAPS  montelukast (SINGULAIR) 10 MG tablet  mycophenolate (GENERIC EQUIVALENT) 250 MG capsule  nystatin (MYCOSTATIN) 988896 UNIT/ML suspension  pantoprazole (PROTONIX) 40 MG EC tablet  polyethylene glycol (MIRALAX) 17 GM/Dose powder  potassium chloride ER (KLOR-CON M) 20 MEQ CR tablet  predniSONE (DELTASONE) 10 MG tablet  rosuvastatin (CRESTOR) 10 MG tablet  senna-docusate (SENOKOT-S/PERICOLACE) 8.6-50 MG tablet  sulfamethoxazole-trimethoprim (BACTRIM) 400-80 MG tablet  tacrolimus (GENERIC EQUIVALENT) 1 MG capsule  traMADol (ULTRAM) 50 MG tablet  valGANciclovir (VALCYTE) 450 MG tablet      Allergies   Allergen Reactions     Beer Shortness Of Breath     Grass Shortness Of Breath     Ace Inhibitors Cough     Dust Mites Other (See Comments)     Asthma     Mold Other (See Comments)     Asthma     Penicillins Other (See Comments)     Unknown - childhood exposure    Tolerated Zosyn -2020     Sulfa Drugs Other (See Comments) and Unknown     Unknown childhood reaction     Family History  Family History   Problem Relation Age of Onset     Cerebrovascular Disease Mother 64     Diabetes Mother      Hypertension Mother      Coronary Artery Disease Father      Diabetes Type 2  Father      Obesity Brother      Obesity Brother      Cerebrovascular Disease Daughter 40     Social History   Social History     Tobacco Use     Smoking status: Former Smoker     Quit date:      Years since quittin.5     Smokeless tobacco: Never Used   Substance Use Topics     Alcohol use: No     Drug use: No      Past medical history, past surgical history,  "medications, allergies, family history, and social history were reviewed with the patient. No additional pertinent items.       Review of Systems   Constitutional: Positive for appetite change (decrease), chills and fever. Negative for unexpected weight change.   Respiratory: Negative for cough and shortness of breath.    Cardiovascular: Negative for chest pain and leg swelling.   Gastrointestinal: Negative for abdominal pain, blood in stool, nausea and vomiting.   Skin: Negative for rash.   Neurological: Positive for weakness (generalized).   All other systems reviewed and are negative.      Physical Exam     ED Triage Vitals [06/28/21 2158]   Enc Vitals Group      BP (!) 147/99      Pulse 123      Resp 20      Temp 100.4  F (38  C)      Temp src Oral      SpO2 97 %      Weight 89.9 kg (198 lb 1.6 oz)      Height 1.727 m (5' 8\")     Physical Exam  Vitals signs and nursing note reviewed.   Constitutional:       Appearance: He is ill-appearing.   HENT:      Head: Normocephalic.      Right Ear: External ear normal.      Left Ear: External ear normal.      Nose: No rhinorrhea.      Mouth/Throat:      Pharynx: Oropharynx is clear.   Neck:      Musculoskeletal: No neck rigidity.   Cardiovascular:      Rate and Rhythm: Tachycardia present.      Pulses: Normal pulses.      Heart sounds: Murmur present.   Pulmonary:      Effort: Pulmonary effort is normal. No respiratory distress.      Breath sounds: Rales present.   Abdominal:      Tenderness: There is no abdominal tenderness. There is no guarding or rebound.   Musculoskeletal:         General: No tenderness.      Right lower leg: Edema present.      Left lower leg: Edema present.   Skin:     Capillary Refill: Capillary refill takes less than 2 seconds.      Coloration: Skin is not jaundiced.      Findings: No rash.   Neurological:      General: No focal deficit present.      Mental Status: He is alert and oriented to person, place, and time.   Psychiatric:         Speech: " Speech normal.         Behavior: Behavior is cooperative.         Cognition and Memory: Cognition is not impaired.         Procedures     10:00 PM  The patient was seen and examined by Parviz Toledo MD in Room ED19.                EKG Interpretation:      Interpreted by Parviz Toledo MD  Time reviewed: 2210  Symptoms at time of EKG: Fatigue, fever   Rhythm: sinus tachycardia  Rate: 120-130  Axis: Normal  Ectopy: none  Conduction: incomplete RBBB  ST Segments/ T Waves: No acute ischemic changes  Q Waves: none  Comparison to prior: Similar to [id number]    Clinical Impression: sinus tachycardia       Results for orders placed or performed during the hospital encounter of 06/28/21   XR Chest Port 1 View     Status: None    Narrative    EXAM: CHEST SINGLE VIEW PORTABLE  LOCATION: [facility]  DATE/TIME: 6/28/2021 10:56 PM    INDICATION: Fever.  COMPARISON: 06/21/2021      Impression    IMPRESSION:   1. A small to moderate size region of patchy opacities in the right lung base, new. This is nonspecific and could represent pneumonia or atelectasis.  2. Blunting of the right costophrenic angle again noted and suggestive of a small to moderate-sized right pleural effusion.  3. No other significant interval change since the relatively recent comparison study.    CT Chest Abdomen Pelvis w/o Contrast     Status: None    Narrative    EXAM: CT CHEST, ABDOMEN AND PELVIS WITHOUT CONTRAST  LOCATION: [facility]  DATE/TIME: 6/29/2021 12:18 AM    INDICATION: Sepsis.  COMPARISON: 05/26/2021.    TECHNIQUE: CT scan of the chest, abdomen, and pelvis was performed without IV contrast. Multiplanar reformats were obtained. Dose reduction techniques were used.  CONTRAST: None.    FINDINGS:    LUNGS AND PLEURA: A 3.8 x 2.6 cm patchy opacity is present in the central aspect of the right lower lobe (series 2 image 150), new. The lungs are otherwise clear. Moderate sized right pleural effusion, increased in size. No  left pleural effusion.    MEDIASTINUM/AXILLAE: Prior median sternotomy. A fragment of a cardiac lead is again noted coursing from the left subclavian vein to the superior vena cava. Atherosclerotic calcification in the aorta.    CORONARY ARTERY CALCIFICATION: Absent.    HEPATOBILIARY: A few small gallstones in the gallbladder.    SPLEEN: Unremarkable.    PANCREAS: Unremarkable.    ADRENAL GLANDS: Unremarkable.    KIDNEYS/BLADDER: Unremarkable.    BOWEL: Prior gastric surgery. The appendix is not visualized.    LYMPH NODES: Unremarkable.    PELVIC ORGANS: No acute findings.    MUSCULOSKELETAL: No acute findings.    OTHER: None.      Impression    IMPRESSION:   1. A small patchy opacity in the central aspect of the right lower lobe, new since 05/26/2021. This most likely represents pneumonia. Several additional patchy opacities that had been present within the lungs on the relatively recent comparison study   have resolved.  2. Moderate-sized right pleural effusion, increased in size since comparison study. A very small left pleural effusion that had been present on the comparison study has resolved.  3. No acute abnormality identified in the abdomen or pelvis.    CBC with platelets differential     Status: Abnormal   Result Value Ref Range    WBC 5.1 4.0 - 11.0 10e9/L    RBC Count 3.84 (L) 4.4 - 5.9 10e12/L    Hemoglobin 11.3 (L) 13.3 - 17.7 g/dL    Hematocrit 35.7 (L) 40.0 - 53.0 %    MCV 93 78 - 100 fl    MCH 29.4 26.5 - 33.0 pg    MCHC 31.7 31.5 - 36.5 g/dL    RDW 15.5 (H) 10.0 - 15.0 %    Platelet Count 243 150 - 450 10e9/L    Diff Method Manual Differential     % Neutrophils 82.8 %    % Lymphocytes 3.6 %    % Monocytes 12.7 %    % Eosinophils 0.0 %    % Basophils 0.9 %    Absolute Neutrophil 4.2 1.6 - 8.3 10e9/L    Absolute Lymphocytes 0.2 (L) 0.8 - 5.3 10e9/L    Absolute Monocytes 0.6 0.0 - 1.3 10e9/L    Absolute Eosinophils 0.0 0.0 - 0.7 10e9/L    Absolute Basophils 0.0 0.0 - 0.2 10e9/L    Anisocytosis Slight      Poikilocytosis Slight     Ovalocytes Slight     Platelet Estimate Confirming automated cell count    Comprehensive metabolic panel     Status: Abnormal   Result Value Ref Range    Sodium 125 (L) 133 - 144 mmol/L    Potassium 5.3 3.4 - 5.3 mmol/L    Chloride 94 94 - 109 mmol/L    Carbon Dioxide 25 20 - 32 mmol/L    Anion Gap 6 3 - 14 mmol/L    Glucose 487 (H) 70 - 99 mg/dL    Urea Nitrogen 39 (H) 7 - 30 mg/dL    Creatinine 1.85 (H) 0.66 - 1.25 mg/dL    GFR Estimate 38 (L) >60 mL/min/[1.73_m2]    GFR Estimate If Black 44 (L) >60 mL/min/[1.73_m2]    Calcium 8.6 8.5 - 10.1 mg/dL    Bilirubin Total 0.5 0.2 - 1.3 mg/dL    Albumin 3.2 (L) 3.4 - 5.0 g/dL    Protein Total 6.5 (L) 6.8 - 8.8 g/dL    Alkaline Phosphatase 268 (H) 40 - 150 U/L    ALT 70 0 - 70 U/L    AST 38 0 - 45 U/L   Lactic acid whole blood     Status: None   Result Value Ref Range    Lactic Acid 1.4 0.7 - 2.0 mmol/L   Blood gas venous     Status: Abnormal   Result Value Ref Range    Ph Venous 7.39 7.32 - 7.43 pH    PCO2 Venous 42 40 - 50 mm Hg    PO2 Venous 23 (L) 25 - 47 mm Hg    Bicarbonate Venous 26 21 - 28 mmol/L    Base Excess Venous 0.6 mmol/L    FIO2 21.0    Troponin I     Status: None   Result Value Ref Range    Troponin I ES <0.015 0.000 - 0.045 ug/L   Nt probnp inpatient (BNP)     Status: Abnormal   Result Value Ref Range    N-Terminal Pro BNP Inpatient 1,697 (H) 0 - 900 pg/mL   Asymptomatic SARS-CoV-2 COVID-19 Virus (Coronavirus) by PCR     Status: None    Specimen: Nasopharyngeal   Result Value Ref Range    SARS-CoV-2 Virus Specimen Source Nasopharyngeal     SARS-CoV-2 PCR Result NEGATIVE     SARS-CoV-2 PCR Comment       Testing was performed using the Xpert Xpress SARS-CoV-2 Assay on the Cepheid Gene-Xpert   Instrument Systems. Additional information about this Emergency Use Authorization (EUA)   assay can be found via the Lab Guide.     CRP inflammation     Status: Abnormal   Result Value Ref Range    CRP Inflammation 27.0 (H) 0.0 - 8.0 mg/L    Procalcitonin     Status: None   Result Value Ref Range    Procalcitonin 0.30 ng/ml   CMV DNA quantification     Status: None   Result Value Ref Range    CMV DNA Quantitation Specimen Canceled, Test credited     CMV Quant IU/mL Canceled, Test credited CMVND^CMV DNA Not Detected [IU]/mL    Log IU/mL of CMVQNT Canceled, Test credited <2.1 [Log_IU]/mL   Blood culture     Status: None (Preliminary result)    Specimen: Arm, Forearm Only, Left; Blood    Left Arm   Result Value Ref Range    Specimen Description Blood Left Arm     Culture Micro PENDING      Medications   sodium chloride (PF) 0.9% PF flush 3 mL (has no administration in time range)   sodium chloride (PF) 0.9% PF flush 3 mL (3 mLs Intracatheter Not Given 6/28/21 2302)   vancomycin (VANCOCIN) 2,000 mg in sodium chloride 0.9 % 500 mL intermittent infusion (2,000 mg Intravenous New Bag 6/29/21 0011)   vancomycin (VANCOCIN) 1000 mg in dextrose 5% 200 mL PREMIX (has no administration in time range)   ceFEPIme (MAXIPIME) 2 g vial to attach to  ml bag for ADULTS or 50 ml bag for PEDS (2 g Intravenous New Bag 6/28/21 2255)   acetaminophen (TYLENOL) tablet 975 mg (975 mg Oral Given 6/28/21 2254)        Assessments & Plan (with Medical Decision Making)   66-year-old male presenting with fever, rigors in the context of recent heart transplant.  He has no severe hemodynamic derangement is mentating properly.  Differential diagnosis includes sepsis, SARS-CoV-2, pneumonia, rejection, viral syndrome, electrolyte derangement, myocarditis, cellulitis, sternal infection, mediastinitis, abdominal or urinary infection.  Will discuss with heart transplant staff for consultation and further diagnostic/management recommendations.  Will initiate early antibiotics while obtaining further diagnostic analysis.  Anticipate admission.  Further documentation per ED Course notes.      I have reviewed the nursing notes.     ED Course     ED Course as of Jun 29 0058 Mon Jun 28,  2021 2206 Cardiac transplant team paged.       2207 Cefepime and vancomycin ordered   Temp: 100.4  F (38  C)   2219 On call heart transplant attending contacted, recommends contact with Cardiology 2 (Heart Failure) service for admission and further recommendations.       2333 Contacted on-call cardiologist (Dr. Rose Conte), recommends CT C/A/P, add PCR for CMV/EBV      2336 WBC 5.1  HGB 11.3     CBC with platelets differential(!)   2336 Na 125  Cr 1.85  Glucose 487 with HCO3 25 and AG 6  ALT 70  AST 38   Comprehensive metabolic panel(!)   2337 Lactic Acid: 1.4   2337 Troponin I ES: <0.015   2337 Ph Venous: 7.39   Tue Jun 29, 2021 0009 SARS-CoV-2 PCR Result: NEGATIVE   0009 N-Terminal Pro BNP Inpatient(!): 1,697   0009 XR Chest Port 1 View   0010 IMPRESSION:   1. A small to moderate size region of patchy opacities in the right lung base, new. This is nonspecific and could represent pneumonia or atelectasis.  2. Blunting of the right costophrenic angle again noted and suggestive of a small to moderate-sized right pleural effusion.  3. No other significant interval change since the relatively recent comparison study.       0057 CRP Inflammation(!): 27.0   0057 Procalcitonin: 0.30   0057                                                                  IMPRESSION:   1. A small patchy opacity in the central aspect of the right lower lobe, new since 05/26/2021. This most likely represents pneumonia. Several additional patchy opacities that had been present within the lungs on the comparison study have resolved.  2. Moderate-sized right pleural effusion, increased in size since comparison study. A very small left pleural effusion that had been present on the comparison study has resolved.  3. No acute abnormality identified in the abdomen or pelvis.    CT Chest Abdomen Pelvis w/o Contrast         Final diagnoses:   S/P orthotopic heart transplant (H)   Pneumonia of right lower lobe due to infectious organism    Pleural effusion, right     Disposition:  Admit to inpatient cardiology service    --  I, Kimo Tidwell, am serving as a trained medical scribe to document services personally performed by Parviz Toledo MD, based on the provider's statements to me.     IParviz MD, was physically present and have reviewed and verified the accuracy of this note documented by Kimo Tidwell.    Parviz Toledo MD  [facility] EMERGENCY DEPARTMENT  6/28/2021     Parviz Toledo MD  06/29/21 0106

## 2021-06-29 NOTE — CONSULTS
"  Endocrinology Consult     Jim Willingham MRN:[id number] YOB: 1955  Date of Admission:6/28/2021   Primary care provider: Ricardo Gómez     Reason for visit: Fever   Reason for Endocrine consult: \"Uncontrolled BS in recent heart transplant patient, here with pulmonary infection\"    HPI:  Jim Willingham is a 66 year old male with PMHx of NICM s/p orthotopic heart transplant on 05/06/21 (discharged on 05/31/21) on immunosuppression including prednisone, CKD III, T2DM, gastric bypass and postprandial hypoglycemia presenting with shortness of breath, cough, fevers and fatigue found to have sepsis likely secondary to community acquired pneumonia.     Per chart review, patient was initially admitted 02/08/2021 due to worsening functional status and renal function concerning for worsening heart failure - eventually received transplant 3 months into admission on 05/06/2021. Endocrine was consulted during admission before transplant for postprandial hypoglycemia and recommended high fiber diet, avoiding high carbohydrate meals, and multiple small meals throughout the day as well was acarbose 25mg daily with breakfast. Blood sugars mostly in 80s-150s during this time, not on any insulin or other diabetes treatment. They also ruled out adrenal insufficiency at this time as patient had history of long term steroid use for carpal tunnel syndrome (had tapered off steroids early in 2021). Patient required insulin drip post-operatively, presumably due to initiation of steroids, which was eventually transitioned to NPH BID + sliding scale and eventually he was discharged on lantus 20 units qAM and sliding scale insulin - preprandial novolog 1 unit for 15 over 140 and evening correction 1 unit for 25 over 200.     Per patient, he took the insulin as prescribed for a couple of weeks and monitored his glucoses with a continuous glucose monitor. However, he then had to get a new phone which he couldn't connect to his " "glucose monitor and stopped monitoring his sugars and taking his insulin all together a couple of weeks ago. He has not had any symptoms of hypoglycemia. Not taking any other diabetes medications, no acarbose since discharge.     Started having a nonproductive cough and shortness of breath about 2 days ago, wife had a cold at home. Very little oral intake over past 2 days due to symptoms.     Upon admission to ED, blood sugar was 487 -> 385 -> 354 -> 350. Then received 9 units novolog for blood sugar in 350s at 0924 and 20 units glargine (home basal insulin) at 1058. Also received dose of prednisone 20mg at 0858. Blood sugar then decreased to 138 at 1200 check.     Past diabetes history as well-documented in initial endocrine consult note from 03/06/21 also copied here:     \"Endocrine history:    The patient has history of type II diabetes which was diagnosed in 1995, which was being managed with oral hypoglycemic agents and insulin therapy    in 2003 the patient had gastric bypass surgery    he did not require any medical therapy for diabetes management for 18 years after his surgery    he does report having you hypoglycemic episodes after his bypass surgery, he said that he had to keep candy in his pocket to consume if he had low glucose readings.    He restarted metformin therapy around 5 to 6 years ago. He had also been subsequently started on short-acting insulin sliding scale for high glucose readings    in September 2020 the patient was also started on Lantus 6 units daily, metformin was discontinued due to worsening of his renal function. He was also on insulin sliding scale for correction of high glucose readings    he reports having good glycemic control with this regimen, denies any frequent hypoglycemic episodes\"    ROS:  Negative except as mentioned in HPI    Past Medical/Surgical History:  Past Medical History:   Diagnosis Date     Bariatric surgery status 2003     Benign essential hypertension " 05/11/2017     Bilateral carpal tunnel syndrome 11/02/2020     Cardiomyopathy, unspecified (H) 05/08/2017     CKD (chronic kidney disease) stage 3, GFR 30-59 ml/min 05/11/2017     COPD (chronic obstructive pulmonary disease) (H) 11/02/2020     Depression 05/11/2017     Diabetes mellitus (H) 1995     Gouty arthropathy, chronic, without tophi 11/02/2020     H/O gastric bypass 05/11/2017     ICD (implantable cardioverter-defibrillator), biventricular, in situ 05/11/2017     LVAD (left ventricular assist device) present (H)      Major depression, recurrent, chronic (H) 11/02/2020     NICM (nonischemic cardiomyopathy) (H)/ EF 20% 05/11/2017    ECHO: LVEDd. 7.66 cm, Restrictive pattern , Severe mitral valve regurgitation     CECILIA (obstructive sleep apnea) 05/11/2017     Paroxysmal atrial fibrillation (H) 05/11/2017     Paroxysmal VT (H) 05/11/2017     Rotator cuff tear arthropathy of both shoulders 11/02/2020     Uncomplicated asthma      Vitamin B12 deficiency (non anemic) 05/11/2017     Past Surgical History:   Procedure Laterality Date     ANESTHESIA CARDIOVERSION N/A 05/11/2020    Procedure: ANESTHESIA, FOR CARDIOVERSION @1100;  Surgeon: GENERIC ANESTHESIA PROVIDER;  Location: [facility] OR     CV HEART BIOPSY N/A 5/13/2021    Procedure: Heart Biopsy;  Surgeon: Scout Robins MD;  Location: [facility]     CV HEART BIOPSY N/A 5/20/2021    Procedure: Heart Biopsy;  Surgeon: Jeffrey Gibson MD;  Location: [facility]     CV HEART BIOPSY N/A 5/27/2021    Procedure: Heart Biopsy;  Surgeon: Jac Dover MD;  Location: [facility]     CV HEART BIOPSY N/A 6/7/2021    Procedure: CV HEART BIOPSY;  Surgeon: Jeffrey Gibson MD;  Location: [facility]     CV HEART BIOPSY N/A 6/21/2021    Procedure: CV HEART BIOPSY;  Surgeon: Scout Robins MD;  Location: [facility]     CV RIGHT HEART CATH MEASUREMENTS RECORDED N/A  07/24/2019    Procedure: CV RIGHT HEART CATH;  Surgeon: Renu Sears MD;  Location:  HEART CARDIAC CATH LAB     CV RIGHT HEART CATH MEASUREMENTS RECORDED N/A 08/05/2020    Procedure: CV RIGHT HEART CATH;  Surgeon: Nicola Seth MD;  Location:  HEART CARDIAC CATH LAB     CV RIGHT HEART CATH MEASUREMENTS RECORDED N/A 01/07/2021    Procedure: CV RIGHT HEART CATH;  Surgeon: Jac Dover MD;  Location:  HEART CARDIAC CATH LAB     CV RIGHT HEART CATH MEASUREMENTS RECORDED N/A 02/23/2021    Procedure: Heart Cath Right Heart Cath;  Surgeon: Jeffrey Gibson MD;  Location:  HEART CARDIAC CATH LAB     CV RIGHT HEART CATH MEASUREMENTS RECORDED N/A 03/23/2021    Procedure: Heart Cath Right Heart Cath. request for 3/23;  Surgeon: Jeffrey Gibson MD;  Location:  HEART CARDIAC CATH LAB     CV RIGHT HEART CATH MEASUREMENTS RECORDED N/A 5/13/2021    Procedure: Right Heart Cath;  Surgeon: Scout Robins MD;  Location:  HEART CARDIAC CATH LAB     CV RIGHT HEART CATH MEASUREMENTS RECORDED N/A 5/20/2021    Procedure: Right Heart Cath;  Surgeon: Jeffrey Gibson MD;  Location:  HEART CARDIAC CATH LAB     CV RIGHT HEART CATH MEASUREMENTS RECORDED N/A 5/27/2021    Procedure: Right Heart Cath;  Surgeon: Jac Dover MD;  Location:  HEART CARDIAC CATH LAB     CV RIGHT HEART CATH MEASUREMENTS RECORDED N/A 6/7/2021    Procedure: CV RIGHT HEART CATH;  Surgeon: Jeffrey Gibson MD;  Location:  HEART CARDIAC CATH LAB     CV RIGHT HEART CATH MEASUREMENTS RECORDED N/A 6/21/2021    Procedure: CV RIGHT HEART CATH;  Surgeon: Scout Robins MD;  Location:  HEART CARDIAC CATH LAB     GI SURGERY  2003    [facility]     INSERT VENTRICULAR ASSIST DEVICE LEFT (HEARTMATE II) N/A 06/19/2017    Procedure: INSERT VENTRICULAR ASSIST DEVICE LEFT (HEARTMATE II);  Median Sternotomy Heartmate II Left Ventricular Assist Device Insertion on Pump  Oxygenator;  Surgeon: Ronnie Quigley MD;  Location: [facility] OR     ORTHOPEDIC SURGERY  1994    right knee wired     PICC DOUBLE LUMEN PLACEMENT Right 09/23/2020    5FR PICC DL. Length-43cm (1cm out).     PICC INSERTION - DOUBLE LUMEN Right 05/09/2021    IK/BRACH     RELEASE CARPAL TUNNEL BILATERAL Bilateral 02/18/2021    Procedure: Bilateral carpal tunnel release;  Surgeon: Jermaine Brand MD;  Location: [facility] OR     TRANSPLANT HEART RECIPIENT N/A 05/05/2021    Procedure: Redo median sternotomy, lysis of adhesions, heart transplant recipient, on cardiopulmonary bypass, intraoperative transesophageal echocardiogram per anesthesia, Implantable Cardioverter Defibrillator (ICD) removal;  Surgeon: Ronnie Quigley MD;  Location: [facility] OR       Allergies:  Allergies   Allergen Reactions     Beer Shortness Of Breath     Grass Shortness Of Breath     Ace Inhibitors Cough     Dust Mites Other (See Comments)     Asthma     Mold Other (See Comments)     Asthma     Penicillins Other (See Comments)     Unknown - childhood exposure    Tolerated Zosyn 9/18-9/20/2020     Sulfa Drugs Other (See Comments) and Unknown     Unknown childhood reaction       PTA Meds:  Prior to Admission medications    Medication Sig Last Dose Taking? Auth Provider   acetaminophen (TYLENOL) 325 MG tablet Take 2 tablets (650 mg) by mouth every 4 hours as needed for other (For optimal non-opioid multimodal pain management to improve pain control.)  at prn Yes Clara Valentin PA-C   albuterol (PROAIR HFA) 108 (90 Base) MCG/ACT inhaler Inhale 2 puffs into the lungs every 4 hours as needed for shortness of breath / dyspnea or wheezing  at prn Yes Clara Valentin PA-C   allopurinol (ZYLOPRIM) 300 MG tablet Take 1 tablet (300 mg) by mouth daily 6/28/2021 at am Yes Clara Valentin PA-C   amitriptyline (ELAVIL) 25 MG tablet Take 1.5 tablets (37.5 mg) by mouth At Bedtime  Patient taking differently: Take 25 mg by mouth At Bedtime  6/28/2021 at pm Yes Clara Valentin PA-C    aspirin (ASA) 81 MG chewable tablet Take 1 tablet (81 mg) by mouth daily 6/28/2021 at am Yes Clara Valentin PA-C   bumetanide (BUMEX) 1 MG tablet Take 2 tablets (2 mg) by mouth daily  Patient taking differently: Take 1 mg by mouth daily  6/28/2021 at am Yes Delisa Montgomery MD   buPROPion (WELLBUTRIN) 100 MG tablet 0.5 tablets (50 mg) by Oral or Feeding Tube route every evening 6/28/2021 at pm Yes Clara Valentin PA-C   buPROPion (WELLBUTRIN) 75 MG tablet Take 1 tablet (75 mg) by mouth 2 times daily 6/28/2021 at am Yes Clara Valentin PA-C   calcium citrate-vitamin D (CITRACAL) 315-250 MG-UNIT TABS per tablet Take 1 tablet by mouth 2 times daily 6/28/2021 at pm Yes Clara Valentin PA-C   Fluticasone-Umeclidin-Vilanterol (TRELEGY ELLIPTA) 100-62.5-25 MCG/INH oral inhaler Inhale 1 puff into the lungs daily 6/28/2021 at Unknown time Yes Clara Valentin PA-C   gabapentin (NEURONTIN) 300 MG capsule Take 1 capsule (300 mg) by mouth every morning 6/28/2021 at am Yes Clara Valentin PA-C   gabapentin (NEURONTIN) 300 MG capsule Take 2 capsules (600 mg) by mouth 2 times daily At 12pm and 8pm 6/28/2021 at pm Yes Clara Valentin PA-C   insulin aspart (NOVOLOG PEN) 100 UNIT/ML pen Inject 1-10 Units Subcutaneous 3 times daily (before meals) Correction Scale - HIGH INSULIN RESISTANCE DOSING     Do Not give Correction Insulin if Pre-Meal BG less than 140.   For Pre-Meal  - 164 give 1 unit.   For Pre-Meal  - 189 give 2 units.   For Pre-Meal  - 214 give 3 units.   For Pre-Meal  - 239 give 4 units.   For Pre-Meal  - 264 give 5 units.   For Pre-Meal  - 289 give 6 units.   For Pre-Meal  - 314 give 7 units.   For Pre-Meal  - 339 give 8 units.   For Pre-Meal  - 364 give 9 units.   For Pre-Meal BG greater than or equal to 365 give 10 units  To be given with prandial insulin, and based on pre-meal blood glucose.   Notify provider if glucose greater than or equal  to 350 mg/dL after administration of correction dose. If given at mealtime, administer within 30 minutes of start of meal Past Week at Unknown time Yes Clara Valentin PA-C   insulin aspart (NOVOLOG PEN) 100 UNIT/ML pen Inject 1-7 Units Subcutaneous At Bedtime HIGH INSULIN RESISTANCE DOSING    Do Not give Bedtime Correction Insulin if BG less than 200.   For  - 224 give 1 units.   For  - 249 give 2 units.   For  - 274 give 3 units.   For  - 299 give 4 units.   For  - 324 give 5 units.   For  - 349 give 6 units.   For BG greater than or equal to 350 give 7 units.   Notify provider if glucose greater than or equal to 350 mg/dL after administration of correction dose. If given at mealtime, administer within 30 minutes of start of meal Past Week at Unknown time Yes Clara Valentin PA-C   insulin glargine (LANTUS PEN) 100 UNIT/ML pen Inject 20 Units Subcutaneous every morning (before breakfast) Past Week at Unknown time Yes Clara Valentin PA-C   Melatonin 10 MG CAPS Take 1 capsule by mouth At Bedtime 6/28/2021 at pm Yes Reported, Patient   montelukast (SINGULAIR) 10 MG tablet Take 1 tablet (10 mg) by mouth At Bedtime 6/28/2021 at pm Yes Clara Valentin PA-C   mycophenolate (GENERIC EQUIVALENT) 250 MG capsule Take 4 capsules (1,000 mg) by mouth 2 times daily 6/28/2021 at pm Yes Delisa Montgomery MD   nystatin (MYCOSTATIN) 621523 UNIT/ML suspension Swish and swallow 10 mLs (1,000,000 Units) in mouth 4 times daily 6/28/2021 at am Yes Delisa Montgomery MD   pantoprazole (PROTONIX) 40 MG EC tablet Take 1 tablet (40 mg) by mouth 2 times daily 6/28/2021 at pm Yes Clara Valentin PA-C   polyethylene glycol (MIRALAX) 17 GM/Dose powder Take 1 capful by mouth daily as needed for constipation  at prn Yes Reported, Patient   potassium chloride ER (KLOR-CON M) 20 MEQ CR tablet Take 1 tablet (20 mEq) by mouth 2 times daily 6/28/2021 at pm Yes Clara Valentin PA-C   predniSONE  (DELTASONE) 10 MG tablet Take 2 tablets (20 mg) by mouth every morning 6/28/2021 at am Yes Clara Valentin PA-C   rosuvastatin (CRESTOR) 10 MG tablet Take 1 tablet (10 mg) by mouth daily 6/28/2021 at am Yes Clara Valentin PA-C   sulfamethoxazole-trimethoprim (BACTRIM) 400-80 MG tablet Take 1 tablet by mouth every 48 hours 6/24/2021 at am Yes Clara Valentin PA-C   tacrolimus (GENERIC EQUIVALENT) 1 MG capsule Take 5 capsules (5 mg) by mouth every morning AND 4 capsules (4 mg) every evening. 6/28/2021 at pm Yes Delisa Montgomery MD   traMADol (ULTRAM) 50 MG tablet Take 0.5-1 tablets (25-50 mg) by mouth every 6 hours as needed for moderate pain  at prn Yes Clara Valentin PA-C   valGANciclovir (VALCYTE) 450 MG tablet Take 1 tablet (450 mg) by mouth daily 6/28/2021 at am Yes Clara Valentin PA-C   ACE/ARB/ARNI NOT PRESCRIBED (INTENTIONAL) ACE & ARB not prescribed due to Other: Per cardiology   Rose Conte MD   blood glucose monitoring (ONE TOUCH ULTRA 2) meter device kit Use to test blood sugar four times daily or as directed.   Soraya Marshall APRN CNP   blood glucose VI test strip Use to test blood sugar four times daily or as directed.   Soraya Marshall APRN CNP   Continuous Blood Gluc Transmit (DEXCOM G6 TRANSMITTER) MISC    Reported, Patient   cyanocobalamin (VITAMIN B12) 1000 MCG/ML injection Inject 1,000 mcg into the muscle every 30 days More than a month at Unknown time  Reported, Patient   cyclobenzaprine (FLEXERIL) 5 MG tablet Take 5 mg by mouth 3 times daily as needed for muscle spasms More than a month at prn  Reported, Patient   HUMALOG KWIKPEN 100 UNIT/ML soln USE 1 TO 7 UNITS SUBCUTANEOUS FOUR TIMES DAILY   Reported, Patient   insulin pen needle (32G X 4 MM) 32G X 4 MM miscellaneous Use four pen needles daily or as directed.   Soraya Marshall APRN CNP   senna-docusate (SENOKOT-S/PERICOLACE) 8.6-50 MG tablet Take 2 tablets by mouth daily as needed for  constipation  Patient not taking: Reported on 6/29/2021 Not Taking at Unknown time  Delisa Montgomery MD        Current Medications:   Current Facility-Administered Medications   Medication     0.9% sodium chloride BOLUS     acetaminophen (TYLENOL) tablet 650 mg     allopurinol (ZYLOPRIM) tablet 300 mg     alum & mag hydroxide-simethicone (MAALOX) suspension 30 mL     amitriptyline (ELAVIL) half-tab 37.5 mg     aspirin (ASA) chewable tablet 81 mg     buPROPion (WELLBUTRIN) tablet 75 mg     calcium citrate-vitamin D (CITRACAL) 315-250 MG-UNIT per tablet 1 tablet     ceFEPIme (MAXIPIME) 2 g vial to attach to  ml bag for ADULTS or 50 ml bag for PEDS     cyclobenzaprine (FLEXERIL) tablet 5 mg     glucose gel 15-30 g    Or     dextrose 50 % injection 25-50 mL    Or     glucagon injection 1 mg     doxycycline hyclate (VIBRAMYCIN) capsule 100 mg     fluticasone-vilanterol (BREO ELLIPTA) 100-25 MCG/INH inhaler 1 puff     gabapentin (NEURONTIN) capsule 300 mg     gabapentin (NEURONTIN) capsule 600 mg     [Held by provider] heparin ANTICOAGULANT injection 5,000 Units     insulin aspart (NovoLOG) injection (RAPID ACTING)     insulin aspart (NovoLOG) injection (RAPID ACTING)     insulin glargine (LANTUS PEN) injection 20 Units     levalbuterol (XOPENEX) neb solution 0.31 mg     lidocaine (LMX4) cream     lidocaine 1 % 0.1-1 mL     medication instruction     montelukast (SINGULAIR) tablet 10 mg     mycophenolate (GENERIC EQUIVALENT) capsule 1,000 mg     nystatin (MYCOSTATIN) suspension 1,000,000 Units     pantoprazole (PROTONIX) EC tablet 40 mg     polyethylene glycol (MIRALAX) Packet 17 g     predniSONE (DELTASONE) tablet 20 mg     rosuvastatin (CRESTOR) tablet 10 mg     sodium chloride (PF) 0.9% PF flush 3 mL     sodium chloride (PF) 0.9% PF flush 3 mL     sodium chloride (PF) 0.9% PF flush 3 mL     sodium chloride (PF) 0.9% PF flush 3 mL     sulfamethoxazole-trimethoprim (BACTRIM) 400-80 MG per tablet 1 tablet      tacrolimus (GENERIC EQUIVALENT) capsule 4 mg     tacrolimus (GENERIC EQUIVALENT) capsule 5 mg     traMADol (ULTRAM) half-tab 25-50 mg     umeclidinium (INCRUSE ELLIPTA) 62.5 MCG/INH inhaler 1 puff     valGANciclovir (VALCYTE) tablet 450 mg     [START ON 6/30/2021] vancomycin (VANCOCIN) 1000 mg in dextrose 5% 200 mL PREMIX     Current Outpatient Medications   Medication     acetaminophen (TYLENOL) 325 MG tablet     albuterol (PROAIR HFA) 108 (90 Base) MCG/ACT inhaler     allopurinol (ZYLOPRIM) 300 MG tablet     amitriptyline (ELAVIL) 25 MG tablet     aspirin (ASA) 81 MG chewable tablet     bumetanide (BUMEX) 1 MG tablet     buPROPion (WELLBUTRIN) 100 MG tablet     buPROPion (WELLBUTRIN) 75 MG tablet     calcium citrate-vitamin D (CITRACAL) 315-250 MG-UNIT TABS per tablet     Fluticasone-Umeclidin-Vilanterol (TRELEGY ELLIPTA) 100-62.5-25 MCG/INH oral inhaler     gabapentin (NEURONTIN) 300 MG capsule     gabapentin (NEURONTIN) 300 MG capsule     insulin aspart (NOVOLOG PEN) 100 UNIT/ML pen     insulin aspart (NOVOLOG PEN) 100 UNIT/ML pen     insulin glargine (LANTUS PEN) 100 UNIT/ML pen     Melatonin 10 MG CAPS     montelukast (SINGULAIR) 10 MG tablet     mycophenolate (GENERIC EQUIVALENT) 250 MG capsule     nystatin (MYCOSTATIN) 615978 UNIT/ML suspension     pantoprazole (PROTONIX) 40 MG EC tablet     polyethylene glycol (MIRALAX) 17 GM/Dose powder     potassium chloride ER (KLOR-CON M) 20 MEQ CR tablet     predniSONE (DELTASONE) 10 MG tablet     rosuvastatin (CRESTOR) 10 MG tablet     sulfamethoxazole-trimethoprim (BACTRIM) 400-80 MG tablet     tacrolimus (GENERIC EQUIVALENT) 1 MG capsule     traMADol (ULTRAM) 50 MG tablet     valGANciclovir (VALCYTE) 450 MG tablet     ACE/ARB/ARNI NOT PRESCRIBED (INTENTIONAL)     blood glucose monitoring (ONE TOUCH ULTRA 2) meter device kit     blood glucose VI test strip     Continuous Blood Gluc Transmit (DEXCOM G6 TRANSMITTER) MISC     cyanocobalamin (VITAMIN B12) 1000 MCG/ML  injection     cyclobenzaprine (FLEXERIL) 5 MG tablet     HUMALOG KWIKPEN 100 UNIT/ML soln     insulin pen needle (32G X 4 MM) 32G X 4 MM miscellaneous     senna-docusate (SENOKOT-S/PERICOLACE) 8.6-50 MG tablet       Family History:  Family History   Problem Relation Age of Onset     Cerebrovascular Disease Mother 64     Diabetes Mother      Hypertension Mother      Coronary Artery Disease Father      Diabetes Type 2  Father      Obesity Brother      Obesity Brother      Cerebrovascular Disease Daughter 40       Social History:  Social History     Tobacco Use     Smoking status: Former Smoker     Quit date:      Years since quittin.5     Smokeless tobacco: Never Used   Substance Use Topics     Alcohol use: No         Physical Examination:  Temp: 102.7  F (39.3  C) Temp src: Rectal BP: 109/62 Pulse: 107   Resp: 16 SpO2: 100 % O2 Device: None (Room air)    General: Fatigued appearing, laying in bed, dozing off during parts of interview and exam, occasionally shivering   Respiratory: Nasal cannula in place, saturating well but short of breath at rest   CV: Heart sounds distant, tachycardic, no murmurs/rubs/gallops appreciated, no lower extremity edema, extremities warm  Neuro: Alert and oriented to self and place, stated month was July, answering questions appropriately but slowly    Endocrine Labs:  Glucoses since admission:  487 -> 385 -> 354 -> 350 -> received 20 units glargine and 9 units novolog + 20 mg prednisone -> 138    Sodium 125 (associated with glucose 487) -> Sodium 129 (associated with glucose 354)           Assessment and Plan:   Jim Willingham is a 66 year old male with PMHx of NICM s/p orthotopic heart transplant on 21 (discharged on 21) on immunosuppression including prednisone, CKD III, T2DM, gastric bypass and postprandial hypoglycemia presenting with sepsis most likely secondary to CAP.    Type II Diabetes  Hx of postprandial hypoglycemia  Prior to transplant patient's glucoses  were in normal range with A1c 5.1% off of insulin. Now hyperglycemic, however, likely secondary to baseline impaired glucose tolerance with history of T2DM in setting of ongoing prednisone use for immunosuppression, acute illness, and not taking insulin over past ~2 weeks. Blood sugar responded well to home glargine + correction dose of novolog.   - Continue glargine 20 units qAM  - Recommend moderate dose sliding scale insulin (1 unit for every 50 over 140) - ordered for you  - If diet is ordered for patient, recommend also ordering meal time lantus - 1 unit for every 15 g of carbs  - q4 hour blood sugar checks  - hypoglycemia protocol  - If dose of prednisone is changed during hospitalization or at time of discharge, please let endocrine team know as this will alter plan for insulin  - Ensure patient has a plan for monitoring glucose and administering insulin at time of discharge (had not been taking insulin prior to admission as CGM was not compatible with new phone)       Patient seen and examined with attending, Dr. Mims.    Meryl Darby MD  Internal Medicine PGY-1    Attending tie-in note  I saw the patient with resident Dr. Darby and directly examined patient and discussed. Agree above note and plan.       Jacque Mims MD  Staff Physician  Endocrinology and Metabolism  [facility]  License: [id number]  Pager: [phone]

## 2021-06-29 NOTE — TELEPHONE ENCOUNTER
Jim Willingham is a 66 year old male with history of NICM EF 20% c/b VT s/p CRT-D s/p HMII LVAD 6/19/2017 with subsequent OHT 5/6/21. His postoperative course was complicated by right pleural air leak with prolonged chest tube and ileus requiring NG tube placement.     More SOB started 1 pm. Lately more tired. He also had fever  T 101. Reports wheezing. No phlegm, some mild cough, nasal drainage. Some chest tightness. Did PT today, did a lot of walking. No nausea, no vomiting, no blood in stool or urine.    Reports low appetite. Reports headache that started a couple of days ago. No sick contacts.     Told him to present to the ED to be evaluated.

## 2021-06-29 NOTE — PROGRESS NOTES
[facility]   Patient is currently open to home care services with [facility]. The patient is currently receiving RN PT OT services. [facility]  and team have been notified of patient admission. [facility] liaison will continue to follow patient during stay. If appropriate provide orders to resume home care at time of discharge.    Debi Meier RN BSN  [facility] Liaison  [phone]

## 2021-06-29 NOTE — ED TRIAGE NOTES
Pt BIBA [facility] with c/o Fever, SOB, and increased lethargy. Pt reports to have had a heart tansplant on the 8th of May. Pt states that this has been going on for a week and came in today because his  Told him to.

## 2021-06-29 NOTE — CONSULTS
[facility] INFECTIOUS DISEASES CONSULTATION     Patient:  Jim Willingham   YOB: 1955  Date of Visit:  06/29/2021  Date of Admission: 6/28/2021  Consult Requester:Rose Conte MD          Assessment and Recommendations:   Recommendations:  1. Continue Cefepime 2g q12hrs (renally adjusted for CrCl of 44) and IV Vancomycin at this time given septic presentation while infectious work up pending.   2. Would switch doxycycline to Azithromycin 500 mg daily x3 days for coverage of atypical pneumonia if no contraindications (QTc appeared normal).  3. Recommend pulmonary consult to consider a bronchoscopy and whether diagnostic/therapeutic thoracentesis is reasonable to work up new pulmonary infiltrate and effusion.  4. Please evaluate whether donor cultures resulted with any significant pathology. This will help guide infectious work up.   5. Send fungal antibodies, histoplasma Ag urine and serum, and blastomyces Ag urine and serum (ordered for you).   6. Will follow pending infectious work up.     Assessment:  Jim Willingham is a 66 year old male with PMHx significant for COPD, CKD stage III, DMII,  NICM s/p OHT (5/6/21) on immunosuppression who presents to the ED with SOB, cough, fever, chills and fatigue.     Febrile to 102.7 and tachycardic to 120s. BPs initially normal, now a bit soft around 91/79. O2 sats wnl on RA currently. WBC normal on admission but CRP mildly elevated to 27. Procal not useful with CKDIII. Lactate 1.4. No UA performed. From ED, aspergillus galactomannan, Fungitell, legionella urine Ag, strep pneumo urine Ag, MRSA nares, RVP pending. BCs NGTD. COVID PCR negative.     CXR with sm-mod patchy opacities in R lung base which could represent pna vs atelectasis, sm to mod R sided pleural effusion. CT CAP w/o contrast with sm patchy opacity in RLL new since 5/26 likely representing PNA. Other previous opacities resolved. Moderate R sided pleural effusion increased in size. No acute  abnormality in AP.      Previous ID Issues:  - S hominis bacteremia 9/2020, treated and 4 negative surveillance BCx.     Other ID issues:  - QTc interval:  438 msec on 6/28  - Pneumocystis prophylaxis:  Bactrim ppx  - Viral serostatus & prophylaxis:  CMV: D+/R+, EBV: D+/R+, Toxo D-/R-, on Valcyte  - Fungal prophylaxis:  Nystatin  - Immunization status: Will need a Pneumovax 23 vaccine three to six months post-transplant.    - Gamma globulin status:  838 12/21/20  - Isolation status: Good hand hygiene    Thank you for the consult. Transplant ID will continue to follow with you.     Candelaria Gonzalez PA-C  Infectious Diseases  Pager #[phone]          History of Present Illness:   Jim Willingham is a 66 year old male with PMHx significant for COPD, CKD stage III, DMII,  NICM s/p OHT (5/6/21) on immunosuppression who presents to the ED with SOB, cough, fever, chills and fatigue.     Patient endorses onset of nonproductive cough, rhinorrhea, fever (up to 102)/chills, and SOB as well as fatigue and poor PO intake around 6/26. Did have sick contact at home with URI sx (wife) and does have granddaugther living with them who has an active lifestyle outside of the home (sports). He has otherwise been feeling in his usual state of health prior to onset of sx. Denies sore throat, nausea/vomiting/diarrhea, abdominal pain, dysuria or hematuria, headache, vision changes or neck pain. No tooth pain.     Denies recent travel. Endorses not leaving the house or walking outside. No other known sick contacts beside wife and granddaughter.          Review of Systems:     CONSTITUTIONAL:  Fever, chills, malaise.   EYES: negative for icterus, conjunctival injection or drainage.  ENT:  negative for nasal congestion or sore throat. Positive for rhinorrhea.    RESPIRATORY:  Positive for dry cough and SOB at rest. Denies sputum production.  CARDIOVASCULAR:  negative for chest pain.  GASTROINTESTINAL:  negative for nausea, vomiting, diarrhea  and constipation.  GENITOURINARY:  negative for dysuria, frequency or urgency.   HEME:  No easy bruising.  INTEGUMENT:  negative for rash and pruritus.  NEURO:  Negative for headache, vision changes, or weakness.         Past Medical History:     Past Medical History:   Diagnosis Date     Bariatric surgery status 2003     Benign essential hypertension 05/11/2017     Bilateral carpal tunnel syndrome 11/02/2020     Cardiomyopathy, unspecified (H) 05/08/2017     CKD (chronic kidney disease) stage 3, GFR 30-59 ml/min 05/11/2017     COPD (chronic obstructive pulmonary disease) (H) 11/02/2020     Depression 05/11/2017     Diabetes mellitus (H) 1995     Gouty arthropathy, chronic, without tophi 11/02/2020     H/O gastric bypass 05/11/2017     ICD (implantable cardioverter-defibrillator), biventricular, in situ 05/11/2017     LVAD (left ventricular assist device) present (H)      Major depression, recurrent, chronic (H) 11/02/2020     NICM (nonischemic cardiomyopathy) (H)/ EF 20% 05/11/2017    ECHO: LVEDd. 7.66 cm, Restrictive pattern , Severe mitral valve regurgitation     CECILIA (obstructive sleep apnea) 05/11/2017     Paroxysmal atrial fibrillation (H) 05/11/2017     Paroxysmal VT (H) 05/11/2017     Rotator cuff tear arthropathy of both shoulders 11/02/2020     Uncomplicated asthma      Vitamin B12 deficiency (non anemic) 05/11/2017            Past Surgical History:     Past Surgical History:   Procedure Laterality Date     ANESTHESIA CARDIOVERSION N/A 05/11/2020    Procedure: ANESTHESIA, FOR CARDIOVERSION @1100;  Surgeon: GENERIC ANESTHESIA PROVIDER;  Location: [facility] OR     CV HEART BIOPSY N/A 5/13/2021    Procedure: Heart Biopsy;  Surgeon: Scout Robins MD;  Location: [facility]     CV HEART BIOPSY N/A 5/20/2021    Procedure: Heart Biopsy;  Surgeon: Jeffrey Gibson MD;  [facility]     CV HEART BIOPSY N/A 5/27/2021    Procedure: Heart Biopsy;  Surgeon: Jazzmine  Jac Rodriguez MD;  Location:  HEART CARDIAC CATH LAB     CV HEART BIOPSY N/A 6/7/2021    Procedure: CV HEART BIOPSY;  Surgeon: Jeffrey Gibson MD;  Location:  HEART CARDIAC CATH LAB     CV RIGHT HEART CATH MEASUREMENTS RECORDED N/A 07/24/2019    Procedure: CV RIGHT HEART CATH;  Surgeon: Renu Sears MD;  Location:  HEART CARDIAC CATH LAB     CV RIGHT HEART CATH MEASUREMENTS RECORDED N/A 08/05/2020    Procedure: CV RIGHT HEART CATH;  Surgeon: Nicola Seth MD;  Location:  HEART CARDIAC CATH LAB     CV RIGHT HEART CATH MEASUREMENTS RECORDED N/A 01/07/2021    Procedure: CV RIGHT HEART CATH;  Surgeon: Jac Dover MD;  Location:  HEART CARDIAC CATH LAB     CV RIGHT HEART CATH MEASUREMENTS RECORDED N/A 02/23/2021    Procedure: Heart Cath Right Heart Cath;  Surgeon: Jeffrey Gibson MD;  Location:  HEART CARDIAC CATH LAB     CV RIGHT HEART CATH MEASUREMENTS RECORDED N/A 03/23/2021    Procedure: Heart Cath Right Heart Cath. request for 3/23;  Surgeon: Jeffrey Gibson MD;  Location:  HEART CARDIAC CATH LAB     CV RIGHT HEART CATH MEASUREMENTS RECORDED N/A 5/13/2021    Procedure: Right Heart Cath;  Surgeon: Scout Robins MD;  Location:  HEART CARDIAC CATH LAB     CV RIGHT HEART CATH MEASUREMENTS RECORDED N/A 5/20/2021    Procedure: Right Heart Cath;  Surgeon: Jeffrey Gibson MD;  Location:  HEART CARDIAC CATH LAB     CV RIGHT HEART CATH MEASUREMENTS RECORDED N/A 5/27/2021    Procedure: Right Heart Cath;  Surgeon: Jac Dover MD;  Location:  HEART CARDIAC CATH LAB     CV RIGHT HEART CATH MEASUREMENTS RECORDED N/A 6/7/2021    Procedure: CV RIGHT HEART CATH;  Surgeon: Jeffrey Gibson MD;  Location:  HEART CARDIAC CATH LAB     GI SURGERY  2003    [facility]     INSERT VENTRICULAR ASSIST DEVICE LEFT (HEARTMATE II) N/A 06/19/2017    Procedure: INSERT VENTRICULAR ASSIST DEVICE LEFT (HEARTMATE II);  Median  Sternotomy Heartmate II Left Ventricular Assist Device Insertion on Pump Oxygenator;  Surgeon: Ronnie Quigley MD;  Location: [facility] OR     ORTHOPEDIC SURGERY      right knee wired     PICC DOUBLE LUMEN PLACEMENT Right 2020    5FR PICC DL. Length-43cm (1cm out).     PICC INSERTION - DOUBLE LUMEN Right 2021    IK/BRACH     RELEASE CARPAL TUNNEL BILATERAL Bilateral 2021    Procedure: Bilateral carpal tunnel release;  Surgeon: Jermaine Brand MD;  Location: [facility] OR     TRANSPLANT HEART RECIPIENT N/A 2021    Procedure: Redo median sternotomy, lysis of adhesions, heart transplant recipient, on cardiopulmonary bypass, intraoperative transesophageal echocardiogram per anesthesia, Implantable Cardioverter Defibrillator (ICD) removal;  Surgeon: Ronnie Quigley MD;  Location:  OR            Family History:     Family History   Problem Relation Age of Onset     Cerebrovascular Disease Mother 64     Diabetes Mother      Hypertension Mother      Coronary Artery Disease Father      Diabetes Type 2  Father      Obesity Brother      Obesity Brother      Cerebrovascular Disease Daughter 40            Social History:     Social History     Tobacco Use     Smoking status: Former Smoker     Quit date:      Years since quittin.5     Smokeless tobacco: Never Used   Substance Use Topics     Alcohol use: No     History   Sexual Activity     Sexual activity: Yes     Partners: Female            Current Medications:       allopurinol  300 mg Oral Daily     amitriptyline  37.5 mg Oral At Bedtime     aspirin  81 mg Oral Daily     buPROPion  75 mg Oral BID     calcium citrate-vitamin D  1 tablet Oral BID     ceFEPIme (MAXIPIME) IV  2 g Intravenous Q12H     doxycycline hyclate  100 mg Oral BID     Fluticasone-Umeclidin-Vilanterol  1 puff Inhalation Daily     gabapentin  300 mg Oral QAM     gabapentin  600 mg Oral BID     [Held by provider] heparin ANTICOAGULANT  5,000 Units Subcutaneous Q12H     insulin aspart  1-10  Units Subcutaneous TID AC     insulin aspart  1-7 Units Subcutaneous At Bedtime     insulin glargine  20 Units Subcutaneous QAM AC     magnesium sulfate  2 g Intravenous Q2H     montelukast  10 mg Oral At Bedtime     mycophenolate  1,000 mg Oral BID     nystatin  1,000,000 Units Swish & Swallow 4x Daily     pantoprazole  40 mg Oral BID     predniSONE  20 mg Oral QAM     rosuvastatin  10 mg Oral Daily     sodium chloride (PF)  3 mL Intracatheter Q8H     sodium chloride (PF)  3 mL Intracatheter Q8H     sulfamethoxazole-trimethoprim  1 tablet Oral Q48H     tacrolimus  4 mg Oral QPM     tacrolimus  5 mg Oral QAM     valGANciclovir  450 mg Oral Daily     [START ON 6/30/2021] vancomycin (VANCOCIN) IV  1,000 mg Intravenous Q24H            Allergies:     Allergies   Allergen Reactions     Beer Shortness Of Breath     Grass Shortness Of Breath     Ace Inhibitors Cough     Dust Mites Other (See Comments)     Asthma     Mold Other (See Comments)     Asthma     Penicillins Other (See Comments)     Unknown - childhood exposure    Tolerated Zosyn 9/18-9/20/2020     Sulfa Drugs Other (See Comments) and Unknown     Unknown childhood reaction            Physical Exam:   Vitals were reviewed  Temp:  [100.4  F (38  C)-102.7  F (39.3  C)] 102.7  F (39.3  C)  Pulse:  [122-126] 122  Resp:  [16-20] 16  BP: ()/(71-99) 97/71  SpO2:  [94 %-100 %] 100 %    Vitals:    06/28/21 2158   Weight: 89.9 kg (198 lb 1.6 oz)       Constitutional: Pleasant male seen sitting up in bed, in NAD. Alert and interactive.   HEENT: NCAT, anicteric sclerae, conjunctiva clear. Moist mucous membranes.  Respiratory: Tachypnec on 2L NC. Lungs are diminished bilaterally in bases, no focal crackles, difficult to take deep inspirations.   Cardiovascular: Regular rate and rhythm.  GI: Normoactive BS. Abdomen is soft, non-distended, and non-tender to palpation.   Skin: Warm and dry. No rashes or lesions on exposed surfaces.  Musculoskeletal: Extremities grossly  normal. No tenderness or edema present.   Neurologic: A &O x3, speech normal although becomes breathless quite quickly, answering questions appropriately. Moves all extremities spontaneously. Grossly non-focal.  Neuropsychiatric: Mentation and affect normal/appropriate.         Laboratory Data:     Microbiology:  Culture Micro   Date Value Ref Range Status   06/28/2021 No growth after 2 hours  Preliminary   06/28/2021 No growth after 2 hours  Preliminary   05/26/2021 No growth  Final   05/26/2021 No growth  Final   05/26/2021 No acid fast bacilli isolated after 34 days  Preliminary   05/25/2021 Heavy growth  Normal jaxon    Final   05/17/2021 No growth  Final   05/17/2021 No growth  Final   05/15/2021 No growth  Final   11/04/2020 No growth  Final   09/23/2020 No growth  Final   09/22/2020 No growth  Final   09/21/2020 No growth  Final   09/20/2020 No growth  Final   09/19/2020 No growth  Final   09/18/2020 (A)  Final    Cultured on the 2nd day of incubation:  Staphylococcus hominis  Susceptibility testing done on previous specimen     09/18/2020   Final    Critical Value/Significant Value, preliminary result only, called to and read back by  Tamy Leventhal RN 1724 9/20/20 AM     09/18/2020 No growth  Final   09/17/2020 (A)  Final    Cultured on the 1st day of incubation:  Staphylococcus hominis     09/17/2020   Final    Critical Value/Significant Value, preliminary result only, called to and read back by  John Howard RN @1414 09/18/2020 hdh/lm     09/17/2020   Final    (Note)  POSITIVE for Staphylococci other than S.aureus, S.epidermidis and  S.lugdunensis, by [employer] multiplex nucleic acid test.  Coagulase-negative staphylococci are the most common venipuncture or  collection associated skin CONTAMINANTS grown in blood cultures.  Final identification and antimicrobial susceptibility testing will be  verified by standard methods.    Specimen tested with [employer] multiplex, gram-positive blood culture  nucleic acid  test for the following targets: Staph aureus, Staph  epidermidis, Staph lugdunensis, other Staph species, Enterococcus  faecalis, Enterococcus faecium, Streptococcus species, S. agalactiae,  S. anginosus grp., S. pneumoniae, S. pyogenes, Listeria sp., mecA  (methicillin resistance) and Lucía/B (vancomycin resistance).    Critical Value/Significant Value called to and read back by Le Carmona RN. @1712. 9.18.20. BS.      09/17/2020 No growth  Final   01/21/2020 No growth  Final   01/21/2020 (A)  Final    Heavy growth  Haemophilus influenzae  Beta lactamase negative  Beta-lactamase negative Haemophilus influenzae are usually susceptible to ampicillin,   amoxacillin/clavulanic acid, levofloxacin, and 3rd generation cephalosporins, such as   ceftriaxone.     01/20/2020 No growth  Final   01/20/2020 No growth  Final   01/16/2020 (A)  Final    10,000 to 50,000 colonies/mL  Coagulase negative Staphylococcus     01/15/2020 No growth  Final   01/15/2020 (A)  Final    Cultured on the 1st day of incubation:  Staphylococcus epidermidis     01/15/2020   Final    Critical Value/Significant Value, preliminary result only, called to and read back by  Alphonse Cuba RN @ 1920. 1/16/20. AV     01/15/2020   Final    (Note)  POSITIVE for STAPHYLOCOCCUS EPIDERMIDIS and NEGATIVE for the mecA  gene (not resistant to methicillin) by Verigene nucleic acid test.  The mecA gene was not detected. Final identification and  antimicrobial susceptibility testing will be verified by standard  methods.    Specimen tested with Verigene multiplex, gram-positive blood culture  nucleic acid test for the following targets: Staph aureus, Staph  epidermidis, Staph lugdunensis, other Staph species, Enterococcus  faecalis, Enterococcus faecium, Streptococcus species, S. agalactiae,  S. anginosus grp., S. pneumoniae, S. pyogenes, Listeria sp., mecA  (methicillin resistance) and Lucía/B (vancomycin resistance).    Critical Value/Significant Value  called to and read back by Dr. Leos, @2214 01/16/20..     04/13/2018 No growth  Final   04/13/2018 No growth  Final   05/24/2017 No growth  Final   05/23/2017 Moderate growth Normal jaxon  Final       Inflammatory Markers    Recent Labs   Lab Test 06/28/21 2208 05/27/21  0449 05/26/21  0705 05/24/21  0544 05/20/21  0803 05/18/21  0746 05/17/21  0530 05/16/21  0555 10/27/20  1250 10/27/20  1250 10/20/20  1305 10/13/20  1320 10/06/20  1320 09/30/20  1130 04/13/18  1437 04/13/18  1437 05/26/17  0700 05/26/17  0700 05/25/17  0742   SED  --   --   --   --   --   --   --   --   --  10 8 10 20 9  --  18  --  25* 21*   CRP 27.0* 140.0* 220.0* 4.0 6.9 6.0 6.9 8.2*   < > 3.5 11.0* 12.0* 5.7 0.6*   < > 9.4*   < >  --   --     < > = values in this interval not displayed.       Metabolic Studies       Recent Labs   Lab Test 06/29/21  0631 06/28/21 2208 06/25/21  1133 06/24/21  0905 06/21/21  0933 06/17/21  0840 06/07/21  0807 06/07/21  0807 05/26/21  2324 05/26/21  2324 05/04/21  2313 05/04/21  2313   * 125* 134 134 131* 137   < > 140   < >  --    < > 135   POTASSIUM 5.3 5.3 4.5 4.3 4.8 4.5   < > 4.2   < >  --    < > 4.0   CHLORIDE 100 94 101 102 97 103   < > 105   < >  --    < > 101   CO2 23 25 25 23 26 28   < > 30   < >  --    < > 26   ANIONGAP 6 6 8 9 8 6   < > 5   < >  --    < > 7   BUN 41* 39* 48* 41* 46* 46*   < > 41*   < >  --    < > 50*   CR 1.81* 1.85* 2.07* 2.23* 2.31* 2.37*   < > 1.93*   < >  --    < > 1.60*   GFRESTIMATED 39* 38* 33* 30* 29* 28*   < > 36*   < >  --    < > 45*   * 487* 314* 320* 285* 307*   < > 128*   < >  --    < > 115*   A1C  --   --   --   --   --   --   --   --   --   --   --  5.1   QAMAR 8.3* 8.6 8.5 8.0* 8.6 8.4*   < > 8.2*   < >  --    < > 9.1   PHOS  --   --   --  3.5 3.7 3.5   < > 3.7  --   --    < > 3.8   MAG 1.5*  --   --  1.8 1.9 1.8   < > 1.7   < >  --    < > 2.3   LACT  --  1.4  --   --   --   --   --   --   --  1.6   < >  --    CKT  --   --   --   --  31  --   --  49   --   --   --   --     < > = values in this interval not displayed.       Hepatic Studies    Recent Labs   Lab Test 06/29/21  0631 06/28/21 2208 06/24/21  0905 06/21/21  0933 06/17/21  0840 06/10/21  0915 05/29/21  0606 05/29/21  0606 05/28/21 2229   BILITOTAL 0.6 0.5 0.3 0.4 0.4 0.4   < >  --   --    ALKPHOS 217* 268* 197* 200* 168* 174*   < >  --   --    ALBUMIN 2.5* 3.2* 2.9* 3.2* 3.1* 3.1*   < >  --   --    AST 19 38 31 21 9 8   < >  --   --    ALT 47 70 40 39 29 33   < >  --   --    LDH  --   --   --   --   --   --   --  262* 279*    < > = values in this interval not displayed.       Pancreatitis testing    Recent Labs   Lab Test 05/26/21  1340 05/04/21  2313 11/05/20  0907 08/05/20  0335 07/06/18  0856 06/08/17  0944   AMYLASE 49 93  --   --   --   --    LIPASE 25*  --   --  168  --   --    TRIG  --   --  91  --  123 124       Hematology Studies      Recent Labs   Lab Test 06/28/21 2208 06/25/21  1133 06/24/21  0905 06/21/21  1146 06/21/21  0933 06/17/21  0840 06/10/21  0915 06/07/21  0807 06/07/21  0807   WBC 5.1 4.1 4.4  --  9.3 14.3* 9.7  --  7.7   ANEU 4.2  --  2.3  --  8.5* 10.0* 7.1  --  5.8   ALYM 0.2*  --  1.4  --  0.3* 2.1 1.5  --  1.0   VIJAY 0.6  --  0.4  --  0.4 0.6 0.9  --  0.8   AEOS 0.0  --  0.0  --  0.0 0.3 0.0  --  0.0   HGB 11.3* 10.4* 10.7* 10.2* 10.7* 10.5* 10.4*   < > 10.4*   HCT 35.7* 32.6* 34.1*  --  34.1* 33.5* 33.5*  --  34.0*    211 202  --  163 216 406  --  352    < > = values in this interval not displayed.       Arterial Blood Gas Testing    Recent Labs   Lab Test 06/28/21  2247 05/09/21  0956 05/09/21  0902 05/09/21  0344 05/07/21  1900 05/07/21  1900 05/07/21  1700 05/07/21  1700 05/07/21  1420 05/07/21  0340 05/07/21  0340 05/06/21  2152   PH  --   --   --   --   --  7.40  --  7.38 7.39  --  7.38 7.37   PCO2  --   --   --   --   --  38  --  41 40  --  41 44   PO2  --   --   --   --   --  101  --  102 130*  --  153* 148*   HCO3  --   --   --   --   --  23  --  24 24  --   24 25   O2PER 21.0 21 21 REPORT AMENDED: 21.0   < > 2LNC   < > Canceled, Test credited  2L 40   < > 40.0  40.0 40  40    < > = values in this interval not displayed.        Urine Studies     Recent Labs   Lab Test 05/25/21  1230 05/16/21  0528 05/15/21  1730 05/07/21  0804 05/05/21  0515 03/08/21  1525   URINEPH 5.5 5.5 Unsatisfactory specimen - collected in wrong container 5.0 6.0 6.0   NITRITE Negative Negative Unsatisfactory specimen - collected in wrong container Negative Negative Negative   LEUKEST Negative Negative Unsatisfactory specimen - collected in wrong container Negative Negative Negative   WBCU 4 1  --  8* 0 0       Vancomycin Levels     Recent Labs   Lab Test 05/08/21 2004 05/07/21  1701 10/27/20  1250 10/20/20  1305 10/13/20  1320 10/06/20  1320   VANCOMYCIN 18.5 16.0 19.3 16.5 13.4 16.1     Recent Labs   Lab Test 05/08/21 2004 05/07/21  1701 10/27/20  1250   VANCOMYCIN 18.5 16.0 19.3     Hepatitis B Testing   Recent Labs   Lab Test 06/07/21  0807 05/04/21  2313   HBCAB Nonreactive Nonreactive   HEPBANG Nonreactive Nonreactive       Hepatitis C Testing     Hepatitis C Antibody   Date Value Ref Range Status   06/07/2021 Nonreactive NR^Nonreactive Final     Comment:     Assay performance characteristics have not been established for newborns,   infants, and children     05/04/2021 Nonreactive NR^Nonreactive Final     Comment:     Assay performance characteristics have not been established for newborns,   infants, and children         Respiratory Virus Testing    No results found for: RS, FLUAG    Last check of C difficile  No results found for: CDBPCT           Imaging:     Results for orders placed or performed during the hospital encounter of 06/28/21   XR Chest Port 1 View    Narrative    EXAM: CHEST SINGLE VIEW PORTABLE  LOCATION: [facility]  DATE/TIME: 6/28/2021 10:56 PM    INDICATION: Fever.  COMPARISON: 06/21/2021      Impression    IMPRESSION:   1. A small to moderate size  region of patchy opacities in the right lung base, new. This is nonspecific and could represent pneumonia or atelectasis.  2. Blunting of the right costophrenic angle again noted and suggestive of a small to moderate-sized right pleural effusion.  3. No other significant interval change since the relatively recent comparison study.    CT Chest Abdomen Pelvis w/o Contrast    Narrative    EXAM: CT CHEST, ABDOMEN AND PELVIS WITHOUT CONTRAST  LOCATION: [facility]  DATE/TIME: 6/29/2021 12:18 AM    INDICATION: Sepsis.  COMPARISON: 05/26/2021.    TECHNIQUE: CT scan of the chest, abdomen, and pelvis was performed without IV contrast. Multiplanar reformats were obtained. Dose reduction techniques were used.  CONTRAST: None.    FINDINGS:    LUNGS AND PLEURA: A 3.8 x 2.6 cm patchy opacity is present in the central aspect of the right lower lobe (series 2 image 150), new. The lungs are otherwise clear. Moderate sized right pleural effusion, increased in size. No left pleural effusion.    MEDIASTINUM/AXILLAE: Prior median sternotomy. A fragment of a cardiac lead is again noted coursing from the left subclavian vein to the superior vena cava. Atherosclerotic calcification in the aorta.    CORONARY ARTERY CALCIFICATION: Absent.    HEPATOBILIARY: A few small gallstones in the gallbladder.    SPLEEN: Unremarkable.    PANCREAS: Unremarkable.    ADRENAL GLANDS: Unremarkable.    KIDNEYS/BLADDER: Unremarkable.    BOWEL: Prior gastric surgery. The appendix is not visualized.    LYMPH NODES: Unremarkable.    PELVIC ORGANS: No acute findings.    MUSCULOSKELETAL: No acute findings.    OTHER: None.      Impression    IMPRESSION:   1. A small patchy opacity in the central aspect of the right lower lobe, new since 05/26/2021. This most likely represents pneumonia. Several additional patchy opacities that had been present within the lungs on the relatively recent comparison study   have resolved.  2. Moderate-sized right pleural  effusion, increased in size since comparison study. A very small left pleural effusion that had been present on the comparison study has resolved.  3. No acute abnormality identified in the abdomen or pelvis.    US Chest Pleural Effusion Imaging    Narrative    US chest pleural effusion, 2021    INDICATION: Pleural effusion, right side. Evaluate pocket size for  thoracentesis.    COMPARISON: Same day CT.    TECHNIQUE: Grayscale sonographic evaluation of the right chest for  pleural effusion.    FINDINGS:  Moderately sized anechoic fluid collection in the right pleural space,  measuring 10 cm in depth.      Impression    IMPRESSION:  Moderately sized right-sided pleural effusion.    I have personally reviewed the examination and initial interpretation  and I agree with the findings.    JOANNE MCMAHAN MD   [employer]    Narrative    [id number]  [id number]  [id number]  [id number]^KAROL^ALYCIA^YANELI     [facility],[place]  Echocardiography Laboratory  [address] [place] [id number]     Name: LOIS FULLER  MRN: [id number]  : 1955  Study Date: 2021 07:07 AM  Age: 66 yrs  Gender: Male  Patient Location: [facility]  Reason For Study: Transplant - Heart  Ordering Physician: ALYCIA ROBERSON  Performed By: Dlemi Darby RDCS     BSA: 2.0 m2  Height: 68 in  Weight: 198 lb  HR: 120  BP: 102/69 mmHg  ______________________________________________________________________________  Procedure  Limited Portable Echo Adult.  ______________________________________________________________________________  Interpretation Summary  Global and regional left ventricular function is normal with an EF of 60-65%.  Global right ventricular function is normal.  Dilation of the inferior vena cava is present with abnormal respiratory  variation in diameter.  No pericardial effusion is present.  ______________________________________________________________________________  Left Ventricle  Left  ventricular size is normal. Global and regional left ventricular function  is normal with an EF of 60-65%.     Right Ventricle  Global right ventricular function is normal. Mild right ventricular dilation  is present.     Mitral Valve  The mitral valve is normal.     Aortic Valve  Aortic valve is normal in structure and function.     Tricuspid Valve  The tricuspid valve is normal.     Vessels  Dilation of the inferior vena cava is present with abnormal respiratory  variation in diameter. IVC diameter >2.1 cm collapsing <50% with sniff  suggests a high RA pressure estimated at 15 mmHg or greater.     Pericardium  No pericardial effusion is present.     Compared to Previous Study  This study was compared with the study from 21 . Biventricular function is  stable.     ______________________________________________________________________________  Doppler Measurements & Calculations  TR max sloane: 151.0 cm/sec  TR max P.1 mmHg     ______________________________________________________________________________  Report approved by: Shayne Page 2021 08:33 AM           *Note: Due to a large number of results and/or encounters for the requested time period, some results have not been displayed. A complete set of results can be found in Results Review.       ECHO:  Limited   Interpretation Summary  Global and regional left ventricular function is normal with an EF of 60-65%.  Global right ventricular function is normal.  Dilation of the inferior vena cava is present with abnormal respiratory  variation in diameter.  No pericardial effusion is present.

## 2021-06-29 NOTE — PHARMACY-VANCOMYCIN DOSING SERVICE
Pharmacy Vancomycin Initial Note  Date of Service 2021  Patient's  1955  66 year old, male    Indication: fever in recent transplant patient    Current estimated CrCl = Estimated Creatinine Clearance: 43.9 mL/min (A) (based on SCr of 1.85 mg/dL (H)).    Creatinine for last 3 days  2021: 10:08 PM Creatinine 1.85 mg/dL    Recent Vancomycin Level(s) for last 3 days  No results found for requested labs within last 72 hours.      Vancomycin IV Administrations (past 72 hours)      No vancomycin orders with administrations in past 72 hours.                Nephrotoxins and other renal medications (From now, onward)    Start     Dose/Rate Route Frequency Ordered Stop    21 0000  vancomycin (VANCOCIN) 1000 mg in dextrose 5% 200 mL PREMIX      1,000 mg  200 mL/hr over 1 Hours Intravenous EVERY 24 HOURS 21 2310      21 2215  vancomycin (VANCOCIN) 2,000 mg in sodium chloride 0.9 % 500 mL intermittent infusion      2,000 mg  over 120 Minutes Intravenous ONCE 21 2211      21 2212  vancomycin place mandel - receiving intermittent dosing      1 each Intravenous SEE ADMIN INSTRUCTIONS 21 2212            Contrast Orders - past 72 hours (72h ago, onward)    None          Loading dose: 2000 mg at 00:00 2021.  Regimen: 1000 mg IV every 24 hours.  Start time: 23:09 on 2021  Exposure target: AUC24 (range)400-600 mg/L.hr   AUC24,ss: 435 mg/L.hr  Probability of AUC24 > 400: 60 %  Ctrough,ss: 13.8 mg/L  Probability of Ctrough,ss > 20: 16 %  Probability of nephrotoxicity (Lodise JAYLEEN ): 9 %          Plan:  1. Give vancomycin 2000mg IV x1 now, then start vancomycin 1000mg IV q24 hours  2. Vancomycin monitoring method: AUC  3. Vancomycin therapeutic monitoring goal: 400-600 mg*h/L  4. Pharmacy will check vancomycin levels as appropriate in 1-3 Days.    5. Serum creatinine levels will be ordered daily for the first week of therapy and at least twice weekly for subsequent  weeks.      Sorin Simons, PharmD, BCPS

## 2021-06-29 NOTE — PROGRESS NOTES
"ED PT Eval     06/29/21 1100   Quick Adds   Type of Visit Initial PT Evaluation   Living Environment   People in home grandchild(lidia);spouse   Current Living Arrangements house   Home Accessibility stairs to enter home;stairs within home   Number of Stairs, Main Entrance 1   Stair Railings, Main Entrance none   Number of Stairs, Within Home, Primary 8   Stair Railings, Within Home, Primary railing on right side (ascending)   Transportation Anticipated car, drives self;family or friend will provide   Living Environment Comments Per EMR patient lives in house with spouse, sister,  and 7 yo grandchild. 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 Patient primarily ambulating with FWW  and had been working towards SPC use   Disability/Function   Hearing Difficulty or Deaf no   Concentrating, Remembering or Making Decisions Difficulty no   Difficulty Communicating no   Walking or Climbing Stairs Difficulty no   Dressing/Bathing Difficulty no   Toileting issues no   Doing Errands Independently Difficulty (such as shopping) no   Fall history within last six months yes   Number of times patient has fallen within last six months 3  (potentially due to orthostasis)   Change in Functional Status Since Onset of Current Illness/Injury yes   General Information   Onset of Illness/Injury or Date of Surgery 06/28/21   Referring Physician Alexis Lawrence MD   Patient/Family Therapy Goals Statement (PT) To return home   Pertinent History of Current Problem (include personal factors and/or comorbidities that impact the POC) Per EMR \"Jim Willingham is a 66 year old male with history of NICM s/p OHT (5/6/21) postoperative course " "was complicated by right pleural air leak with prolonged chest tube, COPD, CKDIII, DMII, anemia who presents with SOB, cough, fevers/chills, fatigue found to have sepsis likely secondary to CAP\"   Existing Precautions/Restrictions fall;sternal  (8 week sternal precautions almost over (this week))   General Observations activity: ambulate   Cognition   Affect/Mental Status (Cognition) flat/blunted affect;low arousal/lethargic   Cognitive Status Comments Spouse answering majority of questions, patient lethargic and with difficulty initiating mobility   Pain Assessment   Patient Currently in Pain No   Posture    Posture Forward head position;Protracted shoulders   Range of Motion (ROM)   ROM Quick Adds ROM WFL   Strength   Strength Comments grossly deconditioned, unable to assess due to impaired participation   Bed Mobility   Bed Mobility supine-sit;sit-supine   Supine-Sit [place] (Bed Mobility) moderate assist (50% patient effort);2 person assist   Sit-Supine [place] (Bed Mobility) 2 person assist;maximum assist (25% patient effort)   Balance   Balance Comments 2 person mod A for sitting balance at EOB   Clinical Impression   Criteria for Skilled Therapeutic Intervention yes, treatment indicated   PT Diagnosis (PT) impaired functional mobility   Influenced by the following impairments impaired activity tolerance, decreased functional strength, impaired balance   Functional limitations due to impairments bed mobility, transfers, gait, stairs   Clinical Presentation Evolving/Changing   Clinical Presentation Rationale clincial judgement   Clinical Decision Making (Complexity) low complexity   Therapy Frequency (PT) 5x/week   Predicted Duration of Therapy Intervention (days/wks) 2 weeks   Planned Therapy Interventions (PT) balance training;bed mobility training;gait training;ROM (range of motion);stair training;strengthening;stretching;transfer training;neuromuscular re-education   Risk & Benefits of therapy " have been explained evaluation/treatment results reviewed;care plan/treatment goals reviewed;risks/benefits reviewed;current/potential barriers reviewed;participants voiced agreement with care plan;participants included;patient;spouse/significant other   PT Discharge Planning    PT Discharge Recommendation (DC Rec) Transitional Care Facility   PT Rationale for DC Rec Recommend TCU at this time as patient demonstrates greatly impaired mobility and unable to even tolerate seated position. Anticipate patient will recover well with continued inpatient treatment and likely progress to become appropriate for home.    PT Brief overview of current status  Ax1 for sitting EOB   Total Evaluation Time   Total Evaluation Time (Minutes) 8       Perfecto Harris PT, DPT  Pager #[phone]

## 2021-06-29 NOTE — PROGRESS NOTES
[facility]   Cardiology II Service / Advanced Heart Failure  Daily Progress Note      Patient: Jim Willingham  MRN: [id number]  Admission Date: 6/28/2021  Hospital Day # 1    Assessment and Plan: Jim Willingham is a 66 year old male with history of NICM s/p OHT (5/6/21) postoperative course was complicated by right pleural air leak with prolonged chest tube, COPD, CKDIII, DMII, anemia who presents with SOB, cough, fevers/chills, fatigue found to have sepsis felt secondary to CAP.    Today's Plan:  -diagnostic and therapeutic thora today  -transplant ID and pulmonary consulted  -continue current IS regimen  -diabetes management team consulted   -one-time NS bolus 500mL    # Sepsis likely secondary to CAP/RLL infiltrate  # Chronic immunosupression  # Moderate pleural effusion  # Prior right pleural air leak s/p prolonged chest tube  Presenting with two days of non-productive cough, SOB, fevers/chills, fatigue with + sick contacts at home (wife with URI). Febrile (102.7), tachycardic (120s-150s), new/worse RLL consolidation with moderate effusion. Pan CT unrevealing for alternative source. Low suspicion for COPD exacerbation. COVID negative. Fungal unlikely given acuity. RVP negative.   - transplant ID consulted   - continue cefepime/vanc   - change doxycycline to azithromycin  - pulmonary consulted   - diagnostic and therapeutic thoracentesis today per CAPS team   - may need bronch  - blood cultures ngtd, monitor  - strep/legionella urinary antigen pending  - MRSA nares  - EBV/CMV titers pending    # Status post OHT on 5/6/21, history of NICM and HMII LVAD  # Chronic immunosuppression  # Sinus tachycardia, exacerbated in the setting of sepsis   * TTE 6/7/21: EF 65-60%, RV normal, IVC normal  * RHC 6/21/21: RA 9 PA 32/20(25) PCWP 13 Kee CO/CI 6.1/3.1  * Angiogram deferred due to renal dysfunction    Graft Function:   --Volume: hypovolemic, on exam but IVC 2.6cm, gentle fluid bolus for hypotension  today  --BP: /70s   --HR: 120s, ECG confirms sinus, HR improved with IVF    Immunosuppression:   --prednisone 20 mg daily per outpt, taper per protocol   --Cellcept 1000 mg BID (decreased 6/21 due to abnormal CXR per Dr Montgomery)  --tacrolimus 5 mg in AM 4 mg in PM (goal pta 10-12), trough pending today    Serostatus: CMV: D+/R+, EBV: D+/R+, Toxo: D-/R-    Prophylaxis:   --CAV: aspirin 81 mg, rosuvastatin 10 mg daily  --Thrush: Nystatin   --PCP: Bactrim single strength daily  --GI: Protonix   --Osteoporosis: calcium/vitamin D   --CMV: Valcyte 450 mg daily x 3 mos renally dosed    # Hypovolemic hyponatremia  Na 125 on admission from wnl last check, improved to 129 with IVF. Also hyperglycemia BS 300s so corrected, Na 133.   - daily BMP    # CKD stage III, improved  Cr 1.85 from recent baseline ~2.   - daily BMP     # DMII  Recent BS 300s on BMPs, 487 on admission  - PTA insulin regimen for now  - endocrine diabetes consulted    # Anemia   # Thrombocytopenia, resolved  Hgb stable, plts WNL, no signs or symptoms of active bleeding. Multifactorial.      # Depression  - PTA bupropion and amitriptyline    FEN: regular  PROPHY:  Hold heparin, ambulatory  LINES:  PIV  DISPO:  pending  CODE STATUS:  Full code    Kiesha Wilder DNP, NP-C  Advanced Heart Failure/Cardiology II Service  Pager [phone] [facility] [id number]      Late entry - pt seen and examined on 6/29/21  I have seen and examined the patient as part of a shared visit with Kiesha Wilder NP.  I agree with the note above. I reviewed today's vital signs and medications. I have reviewed and discussed with the advanced practice provider their physical examination, assessment, and plan   Briefly, recent heart transplant patient admitted with sepsis 2/2 pneumonia  My key history/exam findings are: Pt feel some better but still with significant lethargy and sob.  BP and HR improved after IVF.  Grossly euvolemic on exam this afternoon.  The key management decisions made by  "me:  Continue broad spectrum antibiotics.  Consult transplant ID and pulmonary.  Continue MMF at 1000mg bid (recently reduced) and tacrolimus goal ~10.  May consider further reduction in immunosuppression if worsening/no improvement in clinical status over next 24-48 hours.    Rose Conte MD  Section Head - Advanced Heart Failure, Transplantation and Mechanical Circulatory Support  Director - Adult [facility]ovascular [facility]  Associate Professor of Medicine, [facility]      ================================================================    Subjective/24-Hr Events:   Last 24 hr care team notes reviewed. Feeling unwell, shortness of breath at rest. Requiring 2L oxygen. Non productive cough. Denies any other new concerns. No lightheadedness.     ROS:  4 point ROS including respiratory, CV, GI and  (other than that noted in the HPI) is negative.     Medications: Reviewed in EPIC.     Physical Exam:   BP 97/71   Pulse 122   Temp 102.7  F (39.3  C) (Rectal)   Resp 16   Ht 1.727 m (5' 8\")   Wt 89.9 kg (198 lb 1.6 oz)   SpO2 100%   BMI 30.12 kg/m      GENERAL: Appears uncomfortable, shortness of breath at rest.   HEENT: Eye symmetrical, no discharge or icterus bilaterally. Mucous membranes moist and without lesions.  NECK: Supple, JVD just above clavicle.   CV: Tachycardic and regular, +S1S2, no murmur, rub, or gallop.   RESPIRATORY: Respirations regular, even, and unlabored. Lungs dim throughout, no overt crackles or wheezes.   GI: Soft, obese and non distended with normoactive bowel sounds present in all quadrants. No tenderness, rebound, guarding.   EXTREMITIES: No peripheral edema. 2+ bilateral pedal pulses.   NEUROLOGIC: Alert and oriented x 3. No focal deficits.   MUSCULOSKELETAL: No joint swelling or tenderness.   SKIN: No jaundice. No rashes or lesions. Warm to touch.     Labs:  CMP  Recent Labs   Lab 06/29/21  0631 06/28/21  2208 06/25/21  1133 06/24/21  0905   * " 125* 134 134   POTASSIUM 5.3 5.3 4.5 4.3   CHLORIDE 100 94 101 102   CO2 23 25 25 23   ANIONGAP 6 6 8 9   * 487* 314* 320*   BUN 41* 39* 48* 41*   CR 1.81* 1.85* 2.07* 2.23*   GFRESTIMATED 39* 38* 33* 30*   GFRESTBLACK 45* 44* 38* 35*   QAMAR 8.3* 8.6 8.5 8.0*   MAG 1.5*  --   --  1.8   PHOS  --   --   --  3.5   PROTTOTAL 5.7* 6.5*  --  5.8*   ALBUMIN 2.5* 3.2*  --  2.9*   BILITOTAL 0.6 0.5  --  0.3   ALKPHOS 217* 268*  --  197*   AST 19 38  --  31   ALT 47 70  --  40       CBC  Recent Labs   Lab 06/28/21  2208 06/25/21  1133 06/24/21  0905   WBC 5.1 4.1 4.4   RBC 3.84* 3.60* 3.65*   HGB 11.3* 10.4* 10.7*   HCT 35.7* 32.6* 34.1*   MCV 93 91 93   MCH 29.4 28.9 29.3   MCHC 31.7 31.9 31.4*   RDW 15.5* 15.1* 15.1*    211 202     Time/Communication  I personally spent a total of 35 minutes. Of that 25 minutes was counseling/coordination of patient's care. Plan of care discussed with patient. See my note above for details.    Patient discussed with Dr. Conte.

## 2021-06-30 ENCOUNTER — TELEPHONE (OUTPATIENT)
Dept: FAMILY MEDICINE | Facility: CLINIC | Age: 66
End: 2021-06-30

## 2021-06-30 DIAGNOSIS — Z11.59 ENCOUNTER FOR SCREENING FOR OTHER VIRAL DISEASES: ICD-10-CM

## 2021-06-30 LAB
1,3 BETA GLUCAN SER-MCNC: 53 PG/ML
ALBUMIN SERPL-MCNC: 2.2 G/DL (ref 3.4–5)
ALBUMIN UR-MCNC: 50 MG/DL
ALP SERPL-CCNC: 165 U/L (ref 40–150)
ALT SERPL W P-5'-P-CCNC: 30 U/L (ref 0–70)
ANION GAP SERPL CALCULATED.3IONS-SCNC: 4 MMOL/L (ref 3–14)
ANISOCYTOSIS BLD QL SMEAR: SLIGHT
APPEARANCE UR: ABNORMAL
AST SERPL W P-5'-P-CCNC: 10 U/L (ref 0–45)
B-D GLUCAN INTERPRETATION (1,3): NEGATIVE
BASOPHILS # BLD AUTO: 0.1 10E9/L (ref 0–0.2)
BASOPHILS NFR BLD AUTO: 0.9 %
BILIRUB DIRECT SERPL-MCNC: 0.2 MG/DL (ref 0–0.2)
BILIRUB SERPL-MCNC: 0.5 MG/DL (ref 0.2–1.3)
BILIRUB UR QL STRIP: NEGATIVE
BUN SERPL-MCNC: 47 MG/DL (ref 7–30)
BURR CELLS BLD QL SMEAR: SLIGHT
CALCIUM SERPL-MCNC: 8.3 MG/DL (ref 8.5–10.1)
CHLORIDE SERPL-SCNC: 101 MMOL/L (ref 94–109)
CMV DNA SPEC NAA+PROBE-ACNC: NORMAL [IU]/ML
CMV DNA SPEC NAA+PROBE-LOG#: NORMAL {LOG_IU}/ML
CO2 SERPL-SCNC: 25 MMOL/L (ref 20–32)
COLOR UR AUTO: YELLOW
CREAT SERPL-MCNC: 2.24 MG/DL (ref 0.66–1.25)
DIFFERENTIAL METHOD BLD: ABNORMAL
EBV DNA # SPEC NAA+PROBE: NORMAL {COPIES}/ML
EBV DNA SPEC NAA+PROBE-LOG#: NORMAL {LOG_COPIES}/ML
EOSINOPHIL # BLD AUTO: 0 10E9/L (ref 0–0.7)
EOSINOPHIL NFR BLD AUTO: 0 %
ERYTHROCYTE [DISTWIDTH] IN BLOOD BY AUTOMATED COUNT: 15.9 % (ref 10–15)
GFR SERPL CREATININE-BSD FRML MDRD: 30 ML/MIN/{1.73_M2}
GLUCOSE BLDC GLUCOMTR-MCNC: 144 MG/DL (ref 70–99)
GLUCOSE BLDC GLUCOMTR-MCNC: 163 MG/DL (ref 70–99)
GLUCOSE BLDC GLUCOMTR-MCNC: 167 MG/DL (ref 70–99)
GLUCOSE BLDC GLUCOMTR-MCNC: 222 MG/DL (ref 70–99)
GLUCOSE BLDC GLUCOMTR-MCNC: 243 MG/DL (ref 70–99)
GLUCOSE BLDC GLUCOMTR-MCNC: 259 MG/DL (ref 70–99)
GLUCOSE SERPL-MCNC: 157 MG/DL (ref 70–99)
GLUCOSE UR STRIP-MCNC: NEGATIVE MG/DL
GRAM STN SPEC: NORMAL
HCT VFR BLD AUTO: 28.1 % (ref 40–53)
HGB BLD-MCNC: 8.9 G/DL (ref 13.3–17.7)
HGB UR QL STRIP: NEGATIVE
HYALINE CASTS #/AREA URNS LPF: 1 /LPF (ref 0–2)
INR PPP: 1.27 (ref 0.86–1.14)
KETONES UR STRIP-MCNC: ABNORMAL MG/DL
L PNEUMO1 AG UR QL IA: NORMAL
LACTATE BLD-SCNC: 0.7 MMOL/L (ref 0.7–2)
LDH SERPL L TO P-CCNC: 184 U/L (ref 85–227)
LEUKOCYTE ESTERASE UR QL STRIP: NEGATIVE
LYMPHOCYTES # BLD AUTO: 0.7 10E9/L (ref 0.8–5.3)
LYMPHOCYTES NFR BLD AUTO: 9.3 %
MAGNESIUM SERPL-MCNC: 2.2 MG/DL (ref 1.6–2.3)
MCH RBC QN AUTO: 29.3 PG (ref 26.5–33)
MCHC RBC AUTO-ENTMCNC: 31.7 G/DL (ref 31.5–36.5)
MCV RBC AUTO: 92 FL (ref 78–100)
METAMYELOCYTES # BLD: 0.3 10E9/L
METAMYELOCYTES NFR BLD MANUAL: 3.7 %
MONOCYTES # BLD AUTO: 0.6 10E9/L (ref 0–1.3)
MONOCYTES NFR BLD AUTO: 7.5 %
MYELOCYTES # BLD: 0.1 10E9/L
MYELOCYTES NFR BLD MANUAL: 0.9 %
NEUTROPHILS # BLD AUTO: 5.8 10E9/L (ref 1.6–8.3)
NEUTROPHILS NFR BLD AUTO: 77.7 %
NITRATE UR QL: NEGATIVE
NRBC # BLD AUTO: 0.1 10*3/UL
NRBC BLD AUTO-RTO: 2 /100
OVALOCYTES BLD QL SMEAR: SLIGHT
PH UR STRIP: 5 PH (ref 5–7)
PHOSPHATE SERPL-MCNC: 2.8 MG/DL (ref 2.5–4.5)
PLATELET # BLD AUTO: 184 10E9/L (ref 150–450)
PLATELET # BLD EST: ABNORMAL 10*3/UL
POIKILOCYTOSIS BLD QL SMEAR: SLIGHT
POTASSIUM SERPL-SCNC: 4.8 MMOL/L (ref 3.4–5.3)
PROT SERPL-MCNC: 5.1 G/DL (ref 6.8–8.8)
RBC # BLD AUTO: 3.04 10E12/L (ref 4.4–5.9)
RBC #/AREA URNS AUTO: 1 /HPF (ref 0–2)
S PNEUM AG SPEC QL: NORMAL
SODIUM SERPL-SCNC: 130 MMOL/L (ref 133–144)
SOURCE: ABNORMAL
SP GR UR STRIP: 1.02 (ref 1–1.03)
SPECIMEN SOURCE: NORMAL
SQUAMOUS #/AREA URNS AUTO: 0 /HPF (ref 0–1)
TACROLIMUS BLD-MCNC: 11.5 UG/L (ref 5–15)
TME LAST DOSE: NORMAL H
UROBILINOGEN UR STRIP-MCNC: NORMAL MG/DL (ref 0–2)
WBC # BLD AUTO: 7.5 10E9/L (ref 4–11)
WBC #/AREA URNS AUTO: 4 /HPF (ref 0–5)

## 2021-06-30 PROCEDURE — 84100 ASSAY OF PHOSPHORUS: CPT | Performed by: INTERNAL MEDICINE

## 2021-06-30 PROCEDURE — 250N000011 HC RX IP 250 OP 636: Performed by: NURSE PRACTITIONER

## 2021-06-30 PROCEDURE — 250N000012 HC RX MED GY IP 250 OP 636 PS 637: Performed by: NURSE PRACTITIONER

## 2021-06-30 PROCEDURE — 87899 AGENT NOS ASSAY W/OPTIC: CPT | Performed by: STUDENT IN AN ORGANIZED HEALTH CARE EDUCATION/TRAINING PROGRAM

## 2021-06-30 PROCEDURE — 87449 NOS EACH ORGANISM AG IA: CPT | Performed by: INTERNAL MEDICINE

## 2021-06-30 PROCEDURE — 80076 HEPATIC FUNCTION PANEL: CPT | Performed by: INTERNAL MEDICINE

## 2021-06-30 PROCEDURE — 999N001017 HC STATISTIC GLUCOSE BY METER IP

## 2021-06-30 PROCEDURE — 36415 COLL VENOUS BLD VENIPUNCTURE: CPT | Performed by: NURSE PRACTITIONER

## 2021-06-30 PROCEDURE — 36415 COLL VENOUS BLD VENIPUNCTURE: CPT | Performed by: INTERNAL MEDICINE

## 2021-06-30 PROCEDURE — 99222 1ST HOSP IP/OBS MODERATE 55: CPT | Mod: 24 | Performed by: INTERNAL MEDICINE

## 2021-06-30 PROCEDURE — 99232 SBSQ HOSP IP/OBS MODERATE 35: CPT | Mod: 24 | Performed by: INTERNAL MEDICINE

## 2021-06-30 PROCEDURE — 99232 SBSQ HOSP IP/OBS MODERATE 35: CPT | Performed by: INTERNAL MEDICINE

## 2021-06-30 PROCEDURE — 96372 THER/PROPH/DIAG INJ SC/IM: CPT | Mod: 59 | Performed by: EMERGENCY MEDICINE

## 2021-06-30 PROCEDURE — 250N000009 HC RX 250: Performed by: NURSE PRACTITIONER

## 2021-06-30 PROCEDURE — 83605 ASSAY OF LACTIC ACID: CPT | Performed by: INTERNAL MEDICINE

## 2021-06-30 PROCEDURE — 96366 THER/PROPH/DIAG IV INF ADDON: CPT | Performed by: EMERGENCY MEDICINE

## 2021-06-30 PROCEDURE — 83735 ASSAY OF MAGNESIUM: CPT | Performed by: INTERNAL MEDICINE

## 2021-06-30 PROCEDURE — 250N000012 HC RX MED GY IP 250 OP 636 PS 637: Performed by: STUDENT IN AN ORGANIZED HEALTH CARE EDUCATION/TRAINING PROGRAM

## 2021-06-30 PROCEDURE — 250N000011 HC RX IP 250 OP 636: Performed by: STUDENT IN AN ORGANIZED HEALTH CARE EDUCATION/TRAINING PROGRAM

## 2021-06-30 PROCEDURE — 85025 COMPLETE CBC W/AUTO DIFF WBC: CPT | Performed by: INTERNAL MEDICINE

## 2021-06-30 PROCEDURE — 258N000003 HC RX IP 258 OP 636: Performed by: NURSE PRACTITIONER

## 2021-06-30 PROCEDURE — 85610 PROTHROMBIN TIME: CPT | Performed by: INTERNAL MEDICINE

## 2021-06-30 PROCEDURE — 87385 HISTOPLASMA CAPSUL AG IA: CPT | Performed by: INTERNAL MEDICINE

## 2021-06-30 PROCEDURE — 84155 ASSAY OF PROTEIN SERUM: CPT | Performed by: INTERNAL MEDICINE

## 2021-06-30 PROCEDURE — 87040 BLOOD CULTURE FOR BACTERIA: CPT | Performed by: NURSE PRACTITIONER

## 2021-06-30 PROCEDURE — 99233 SBSQ HOSP IP/OBS HIGH 50: CPT | Performed by: INTERNAL MEDICINE

## 2021-06-30 PROCEDURE — 80048 BASIC METABOLIC PNL TOTAL CA: CPT | Performed by: INTERNAL MEDICINE

## 2021-06-30 PROCEDURE — 250N000013 HC RX MED GY IP 250 OP 250 PS 637: Performed by: NURSE PRACTITIONER

## 2021-06-30 PROCEDURE — 83615 LACTATE (LD) (LDH) ENZYME: CPT | Performed by: INTERNAL MEDICINE

## 2021-06-30 PROCEDURE — 250N000013 HC RX MED GY IP 250 OP 250 PS 637: Performed by: INTERNAL MEDICINE

## 2021-06-30 PROCEDURE — 80197 ASSAY OF TACROLIMUS: CPT | Performed by: INTERNAL MEDICINE

## 2021-06-30 PROCEDURE — 81001 URINALYSIS AUTO W/SCOPE: CPT | Performed by: INTERNAL MEDICINE

## 2021-06-30 PROCEDURE — 250N000013 HC RX MED GY IP 250 OP 250 PS 637: Performed by: STUDENT IN AN ORGANIZED HEALTH CARE EDUCATION/TRAINING PROGRAM

## 2021-06-30 PROCEDURE — 120N000003 HC R&B IMCU UMMC

## 2021-06-30 RX ORDER — VALGANCICLOVIR 450 MG/1
450 TABLET, FILM COATED ORAL EVERY OTHER DAY
Status: DISCONTINUED | OUTPATIENT
Start: 2021-07-01 | End: 2021-07-05 | Stop reason: HOSPADM

## 2021-06-30 RX ORDER — GABAPENTIN 300 MG/1
300 CAPSULE ORAL 2 TIMES DAILY
Status: DISCONTINUED | OUTPATIENT
Start: 2021-06-30 | End: 2021-07-05 | Stop reason: HOSPADM

## 2021-06-30 RX ORDER — SULFAMETHOXAZOLE AND TRIMETHOPRIM 400; 80 MG/1; MG/1
1 TABLET ORAL DAILY
Status: DISCONTINUED | OUTPATIENT
Start: 2021-06-30 | End: 2021-07-05 | Stop reason: HOSPADM

## 2021-06-30 RX ORDER — ALBUTEROL SULFATE 0.83 MG/ML
2.5 SOLUTION RESPIRATORY (INHALATION) EVERY 6 HOURS PRN
Status: DISCONTINUED | OUTPATIENT
Start: 2021-06-30 | End: 2021-07-01

## 2021-06-30 RX ADMIN — MYCOPHENOLATE MOFETIL 1000 MG: 250 CAPSULE ORAL at 20:23

## 2021-06-30 RX ADMIN — MONTELUKAST 10 MG: 10 TABLET, FILM COATED ORAL at 23:48

## 2021-06-30 RX ADMIN — VALGANCICLOVIR 450 MG: 450 TABLET, FILM COATED ORAL at 08:26

## 2021-06-30 RX ADMIN — VANCOMYCIN HYDROCHLORIDE 1000 MG: 1 INJECTION, SOLUTION INTRAVENOUS at 00:50

## 2021-06-30 RX ADMIN — BUPROPION HYDROCHLORIDE 75 MG: 75 TABLET, FILM COATED ORAL at 20:26

## 2021-06-30 RX ADMIN — PANTOPRAZOLE SODIUM 40 MG: 40 TABLET, DELAYED RELEASE ORAL at 08:23

## 2021-06-30 RX ADMIN — ALBUTEROL SULFATE 2.5 MG: 2.5 SOLUTION RESPIRATORY (INHALATION) at 08:52

## 2021-06-30 RX ADMIN — MYCOPHENOLATE MOFETIL 1000 MG: 250 CAPSULE ORAL at 08:25

## 2021-06-30 RX ADMIN — CEFEPIME 2 G: 2 INJECTION, POWDER, FOR SOLUTION INTRAVENOUS at 13:04

## 2021-06-30 RX ADMIN — MONTELUKAST 10 MG: 10 TABLET, FILM COATED ORAL at 00:22

## 2021-06-30 RX ADMIN — FLUTICASONE FUROATE AND VILANTEROL TRIFENATATE 1 PUFF: 100; 25 POWDER RESPIRATORY (INHALATION) at 08:35

## 2021-06-30 RX ADMIN — Medication 1 TABLET: at 20:26

## 2021-06-30 RX ADMIN — ALLOPURINOL 300 MG: 300 TABLET ORAL at 08:24

## 2021-06-30 RX ADMIN — PREDNISONE 20 MG: 20 TABLET ORAL at 08:23

## 2021-06-30 RX ADMIN — ASPIRIN 81 MG CHEWABLE TABLET 81 MG: 81 TABLET CHEWABLE at 08:23

## 2021-06-30 RX ADMIN — ROSUVASTATIN CALCIUM 10 MG: 5 TABLET, FILM COATED ORAL at 08:24

## 2021-06-30 RX ADMIN — SULFAMETHOXAZOLE AND TRIMETHOPRIM 1 TABLET: 400; 80 TABLET ORAL at 08:33

## 2021-06-30 RX ADMIN — TACROLIMUS 5 MG: 5 CAPSULE ORAL at 08:26

## 2021-06-30 RX ADMIN — NYSTATIN 1000000 UNITS: 500000 SUSPENSION ORAL at 17:22

## 2021-06-30 RX ADMIN — NYSTATIN 1000000 UNITS: 500000 SUSPENSION ORAL at 13:10

## 2021-06-30 RX ADMIN — NYSTATIN 1000000 UNITS: 500000 SUSPENSION ORAL at 20:24

## 2021-06-30 RX ADMIN — GABAPENTIN 300 MG: 300 CAPSULE ORAL at 13:08

## 2021-06-30 RX ADMIN — UMECLIDINIUM 1 PUFF: 62.5 AEROSOL, POWDER ORAL at 08:35

## 2021-06-30 RX ADMIN — PANTOPRAZOLE SODIUM 40 MG: 40 TABLET, DELAYED RELEASE ORAL at 20:23

## 2021-06-30 RX ADMIN — GABAPENTIN 300 MG: 300 CAPSULE ORAL at 20:23

## 2021-06-30 RX ADMIN — Medication 1 TABLET: at 08:24

## 2021-06-30 RX ADMIN — BUPROPION HYDROCHLORIDE 75 MG: 75 TABLET, FILM COATED ORAL at 08:27

## 2021-06-30 RX ADMIN — CEFEPIME 2 G: 2 INJECTION, POWDER, FOR SOLUTION INTRAVENOUS at 00:14

## 2021-06-30 RX ADMIN — NYSTATIN 1000000 UNITS: 500000 SUSPENSION ORAL at 08:28

## 2021-06-30 RX ADMIN — GABAPENTIN 300 MG: 300 CAPSULE ORAL at 08:24

## 2021-06-30 RX ADMIN — INSULIN GLARGINE 20 UNITS: 100 INJECTION, SOLUTION SUBCUTANEOUS at 08:41

## 2021-06-30 RX ADMIN — TACROLIMUS 5 MG: 5 CAPSULE ORAL at 17:22

## 2021-06-30 RX ADMIN — AZITHROMYCIN MONOHYDRATE 500 MG: 500 INJECTION, POWDER, LYOPHILIZED, FOR SOLUTION INTRAVENOUS at 15:03

## 2021-06-30 ASSESSMENT — ACTIVITIES OF DAILY LIVING (ADL)
WALKING_OR_CLIMBING_STAIRS_DIFFICULTY: NO
FALL_HISTORY_WITHIN_LAST_SIX_MONTHS: YES
NUMBER_OF_TIMES_PATIENT_HAS_FALLEN_WITHIN_LAST_SIX_MONTHS: 3
DOING_ERRANDS_INDEPENDENTLY_DIFFICULTY: NO
HEARING_DIFFICULTY_OR_DEAF: NO
DIFFICULTY_EATING/SWALLOWING: NO
EQUIPMENT_CURRENTLY_USED_AT_HOME: WALKER, ROLLING
DIFFICULTY_COMMUNICATING: NO
TOILETING_ISSUES: NO
DRESSING/BATHING_DIFFICULTY: NO
CONCENTRATING,_REMEMBERING_OR_MAKING_DECISIONS_DIFFICULTY: NO
WEAR_GLASSES_OR_BLIND: NO

## 2021-06-30 NOTE — PROGRESS NOTES
[facility] ID PROGRESS NOTE     Patient:  Jim Willingham   Date of birth 1955, Medical record number [id number]  Date of Visit:  06/30/2021  Date of Admission: 6/28/2021  Consult Requester:Rose Conte MD          Assessment and Plan:   Recommendations:  1. Continue Cefepime 2g q12hrs (renally adjusted for CrCl of 44) to treat patient empirically for a presumed bacterial pneumonia. Plan for a 7 day total course through 7/5. If pt discharged before completion, can transition to Levaquin 750 mg (daily to q48hrs depending on CrCl) at discharge to complete course.   2. 1/2 positive BCx from 6/28 after 30 hrs with MRSE from peripheral draw, suspect contaminant, however monitor pending cultures from 6/30. If cultures negative x48hrs, can discontinue IV Vancomycin.   3. Continue Azithromycin 500 mg daily x3 days for coverage of atypical pneumonia (through 7/1).  4. Appreciate pulmonary recommendations.  5. Please repeat CT chest in 2-3 weeks to assure RML/LL consolidation is not worsening. This would be more concerning of uncovered fungal infection.    Thank you for the consult. Infectious disease will sign off at this time. Do not hesitate to contact us with additional questions or with any positive infectious testing results requiring ID input.      Assessment:  Jim Willingham is a 66 year old male with PMHx significant for COPD, CKD stage III, DMII,  NICM s/p OHT (5/6/21) on immunosuppression who presents to the ED with SOB, cough, fever, chills and fatigue.      Febrile to 102.7 and tachycardic to 120s. BPs initially normal, now a bit soft around 91/79. O2 sats wnl on RA currently. WBC normal on admission but CRP mildly elevated to 27. Procal not useful with CKDIII. Lactate 1.4. No UA performed. From ED, aspergillus galactomannan, Fungitell, legionella urine Ag, strep pneumo urine Ag, MRSA nares, RVP pending. BCs 6/28 1/2 peripheral cultures positive after 30 hrs with MRSE, repeat BCx 6/30  pending. COVID PCR negative.      CXR with sm-mod patchy opacities in R lung base which could represent pna vs atelectasis, sm to mod R sided pleural effusion. CT CAP w/o contrast with sm patchy opacity in RLL new since 5/26 likely representing PNA. Other previous opacities resolved. Moderate R sided pleural effusion increased in size. No acute abnormality in AP.     Previous ID Issues:  - S hominis bacteremia 9/2020, treated and 4 negative surveillance BCx.      Other ID issues:  - QTc interval:  438 msec on 6/28  - Pneumocystis prophylaxis:  Bactrim ppx  - Viral serostatus & prophylaxis:  CMV: D+/R+, EBV: D+/R+, Toxo D-/R-, on Valcyte  - Fungal prophylaxis:  Nystatin  - Immunization status: Will need a Pneumovax 23 vaccine three to six months post-transplant.    - Gamma globulin status:  838 12/21/20  - Isolation status: Good hand hygiene    Candelaria Gonzalez PA-C  Infectious Diseases  Pager #[phone]          Interim History and Events:   Jim is feeling much better today per his report. Feeling as though he is breathing better. Less fatigued. Continues to have dry cough, no sputum production.          HPI:   Jim Willingham is a 66 year old male with PMHx significant for COPD, CKD stage III, DMII,  NICM s/p OHT (5/6/21) on immunosuppression who presents to the ED with SOB, cough, fever, chills and fatigue.      Patient endorses onset of nonproductive cough, rhinorrhea, fever (up to 102)/chills, and SOB as well as fatigue and poor PO intake around 6/26. Did have sick contact at home with URI sx (wife) and does have granddaugther living with them who has an active lifestyle outside of the home (sports). He has otherwise been feeling in his usual state of health prior to onset of sx. Denies sore throat, nausea/vomiting/diarrhea, abdominal pain, dysuria or hematuria, headache, vision changes or neck pain. No tooth pain.      Denies recent travel. Endorses not leaving the house or walking outside. No other known  sick contacts beside wife and granddaughter.          ROS:   -Focused 5 point ROS completed and pertinent positives/negatives listed in interim history.       Physical Examination:  Temp: 98.1  F (36.7  C) Temp src: Oral BP: 106/73 Pulse: 103     SpO2: 94 % O2 Device: None (Room air)      Vitals:    06/28/21 2158   Weight: 89.9 kg (198 lb 1.6 oz)       Constitutional: Pleasant male seen lying in bed, in NAD. Alert and interactive.   HEENT: NCAT, anicteric sclerae, conjunctiva clear. Moist mucous membranes.  Respiratory: Non-labored breathing, good air exchange on RA. Lungs are clear to auscultation bilaterally, without wheezing or focal crackles.  Cardiovascular: Regular rate and rhythm with no murmur, rub or gallop.  GI: Normoactive BS. Abdomen is soft, non-distended.  Skin: Warm and dry. No rashes or lesions on exposed surfaces.  Musculoskeletal: Extremities grossly normal. No tenderness or edema present.   Neurologic: A &O x3, speech normal, answering questions appropriately. Moves all extremities spontaneously. Grossly non-focal.  Neuropsychiatric: Mentation and affect normal/appropriate.  VAD: PIV    Medications:    allopurinol  300 mg Oral Daily     [Held by provider] amitriptyline  37.5 mg Oral At Bedtime     aspirin  81 mg Oral Daily     azithromycin  500 mg Intravenous Q24H     buPROPion  75 mg Oral BID     calcium citrate-vitamin D  1 tablet Oral BID     ceFEPIme (MAXIPIME) IV  2 g Intravenous Q12H     fluticasone-vilanterol  1 puff Inhalation Daily     gabapentin  300 mg Oral BID     gabapentin  300 mg Oral QAM     insulin aspart  1-6 Units Subcutaneous Q4H     insulin glargine  20 Units Subcutaneous QAM AC     montelukast  10 mg Oral At Bedtime     mycophenolate  1,000 mg Oral BID     nystatin  1,000,000 Units Swish & Swallow 4x Daily     pantoprazole  40 mg Oral BID     predniSONE  20 mg Oral QAM     rosuvastatin  10 mg Oral Daily     sodium chloride (PF)  3 mL Intracatheter Q8H     sodium chloride (PF)   3 mL Intracatheter Q8H     sulfamethoxazole-trimethoprim  1 tablet Oral Daily     tacrolimus  5 mg Oral QAM     tacrolimus  5 mg Oral QPM     umeclidinium  1 puff Inhalation Daily     [START ON 7/1/2021] valGANciclovir  450 mg Oral Every Other Day     vancomycin (VANCOCIN) IV  1,000 mg Intravenous Q24H       Infusions/Drips:    - MEDICATION INSTRUCTIONS -         Laboratory Data:   No results found for: ACD4    Inflammatory Markers    Recent Labs   Lab Test 06/28/21 2208 05/27/21  0449 05/26/21  0705 05/24/21  0544 05/20/21  0803 05/18/21  0746 05/17/21  0530 05/16/21  0555 10/27/20  1250 10/27/20  1250 10/20/20  1305 10/13/20  1320 10/06/20  1320 09/30/20  1130 04/13/18  1437 04/13/18  1437 05/26/17  0700 05/26/17  0700 05/25/17  0742   SED  --   --   --   --   --   --   --   --   --  10 8 10 20 9  --  18  --  25* 21*   CRP 27.0* 140.0* 220.0* 4.0 6.9 6.0 6.9 8.2*   < > 3.5 11.0* 12.0* 5.7 0.6*   < > 9.4*   < >  --   --     < > = values in this interval not displayed.       Metabolic Studies       Recent Labs   Lab Test 06/30/21  0339 06/29/21  0631 06/28/21 2208 06/25/21  1133 06/24/21  0905 06/21/21  0933 06/07/21  0807 06/07/21  0807 05/04/21  2313 05/04/21  2313   * 129* 125* 134 134 131*   < > 140   < > 135   POTASSIUM 4.8 5.3 5.3 4.5 4.3 4.8   < > 4.2   < > 4.0   CHLORIDE 101 100 94 101 102 97   < > 105   < > 101   CO2 25 23 25 25 23 26   < > 30   < > 26   ANIONGAP 4 6 6 8 9 8   < > 5   < > 7   BUN 47* 41* 39* 48* 41* 46*   < > 41*   < > 50*   CR 2.24* 1.81* 1.85* 2.07* 2.23* 2.31*   < > 1.93*   < > 1.60*   GFRESTIMATED 30* 39* 38* 33* 30* 29*   < > 36*   < > 45*   * 354* 487* 314* 320* 285*   < > 128*   < > 115*   A1C  --   --   --   --   --   --   --   --   --  5.1   AQMAR 8.3* 8.3* 8.6 8.5 8.0* 8.6   < > 8.2*   < > 9.1   PHOS 2.8  --   --   --  3.5 3.7   < > 3.7   < > 3.8   MAG 2.2 1.5*  --   --  1.8 1.9   < > 1.7   < > 2.3   LACT 0.7  --  1.4  --   --   --   --   --    < >  --    CKT  --    --   --   --   --  31  --  49  --   --     < > = values in this interval not displayed.       Hepatic Studies    Recent Labs   Lab Test 06/30/21 0339 06/29/21 0631 06/28/21 2208 06/24/21  0905 06/21/21  0933 06/17/21  0840 05/29/21  0606 05/29/21  0606   BILITOTAL 0.5 0.6 0.5 0.3 0.4 0.4   < >  --    ALKPHOS 165* 217* 268* 197* 200* 168*   < >  --    ALBUMIN 2.2* 2.5* 3.2* 2.9* 3.2* 3.1*   < >  --    AST 10 19 38 31 21 9   < >  --    ALT 30 47 70 40 39 29   < >  --      --   --   --   --   --   --  262*    < > = values in this interval not displayed.       Pancreatitis testing    Recent Labs   Lab Test 05/26/21  1340 05/04/21  2313 11/05/20  0907 08/05/20  0335 07/06/18  0856 06/08/17  0944   AMYLASE 49 93  --   --   --   --    LIPASE 25*  --   --  168  --   --    TRIG  --   --  91  --  123 124       Hematology Studies      Recent Labs   Lab Test 06/30/21 0339 06/29/21 2024 06/29/21  0912 06/28/21 2208 06/25/21  1133 06/24/21  0905 06/21/21  0933 06/21/21  0933 06/17/21  0840   WBC 7.5 7.8 8.0 5.1 4.1 4.4  --  9.3 14.3*   ANEU 5.8  --  4.9 4.2  --  2.3  --  8.5* 10.0*   ALYM 0.7*  --  1.4 0.2*  --  1.4  --  0.3* 2.1   VIJAY 0.6  --  0.9 0.6  --  0.4  --  0.4 0.6   AEOS 0.0  --  0.0 0.0  --  0.0  --  0.0 0.3   HGB 8.9* 8.8* 9.4* 11.3* 10.4* 10.7*   < > 10.7* 10.5*   HCT 28.1* 28.3* 30.6* 35.7* 32.6* 34.1*  --  34.1* 33.5*    164 240 243 211 202  --  163 216    < > = values in this interval not displayed.       Arterial Blood Gas Testing    Recent Labs   Lab Test 06/28/21  2247 05/09/21  0956 05/09/21  0902 05/09/21  0344 05/07/21  1900 05/07/21  1900 05/07/21  1700 05/07/21  1700 05/07/21  1420 05/07/21  0340 05/07/21  0340 05/06/21  2192   PH  --   --   --   --   --  7.40  --  7.38 7.39  --  7.38 7.37   PCO2  --   --   --   --   --  38  --  41 40  --  41 44   PO2  --   --   --   --   --  101  --  102 130*  --  153* 148*   HCO3  --   --   --   --   --  23  --  24 24  --  24 25   O2PER 21.0 21  21  REPORT AMENDED: 21.0   < > 2LNC   < > Canceled, Test credited  2L 40   < > 40.0  40.0 40  40    < > = values in this interval not displayed.        Urine Studies     Recent Labs   Lab Test 06/30/21  0640 05/25/21  1230 05/16/21  0528 05/15/21  1730 05/07/21  0804 05/05/21  0515   URINEPH 5.0 5.5 5.5 Unsatisfactory specimen - collected in wrong container 5.0 6.0   NITRITE Negative Negative Negative Unsatisfactory specimen - collected in wrong container Negative Negative   LEUKEST Negative Negative Negative Unsatisfactory specimen - collected in wrong container Negative Negative   WBCU 4 4 1  --  8* 0       Vancomycin Levels     Recent Labs   Lab Test 05/08/21  2004 05/07/21  1701 10/27/20  1250 10/20/20  1305 10/13/20  1320 10/06/20  1320   VANCOMYCIN 18.5 16.0 19.3 16.5 13.4 16.1       Tobramycin levels     No lab results found.    Gentamicin levels    No lab results found.    CSF testing   No lab results found.      Microbiology:  Culture Micro   Date Value Ref Range Status   06/29/2021 Culture negative monitoring continues  Preliminary   06/29/2021 PENDING  Preliminary   06/28/2021 No growth after 2 days  Preliminary   06/28/2021 (A)  Preliminary    Cultured on the 2nd day of incubation:  Gram positive cocci in clusters     06/28/2021   Preliminary    Critical Value/Significant Value, preliminary result only, called to and read back by  Lisa Rush RN 6/30/21 @ 0427 TF     06/28/2021   Preliminary    (Note)  POSITIVE for STAPHYLOCOCCUS EPIDERMIDIS and POSITIVE for the mecA  gene (resistant to methicillin) by [facility] multiplex nucleic acid  test. Final identification and antimicrobial susceptibility testing  will be verified by standard methods.    Specimen tested with [facility] multiplex, gram-positive blood culture  nucleic acid test for the following targets: Staph aureus, Staph  epidermidis, Staph lugdunensis, other Staph species, Enterococcus  faecalis, Enterococcus faecium, Streptococcus species, S.  agalactiae,  S. anginosus grp., S. pneumoniae, S. pyogenes, Listeria sp., mecA  (methicillin resistance) and Lucía/B (vancomycin resistance).    Critical Value/Significant Value called to and read back by Darcie Saeed RN at 0701 6.30.21. amd     05/26/2021 No growth  Final   05/26/2021 No growth  Final   05/26/2021 No acid fast bacilli isolated after 35 days  Preliminary   05/25/2021 Heavy growth  Normal jaxon    Final   05/17/2021 No growth  Final   05/17/2021 No growth  Final   05/15/2021 No growth  Final   11/04/2020 No growth  Final   09/23/2020 No growth  Final   09/22/2020 No growth  Final   09/21/2020 No growth  Final   09/20/2020 No growth  Final   09/19/2020 No growth  Final   09/18/2020 (A)  Final    Cultured on the 2nd day of incubation:  Staphylococcus hominis  Susceptibility testing done on previous specimen     09/18/2020   Final    Critical Value/Significant Value, preliminary result only, called to and read back by  Tamy Leventhal RN 1724 9/20/20 AM     09/18/2020 No growth  Final   09/17/2020 (A)  Final    Cultured on the 1st day of incubation:  Staphylococcus hominis     09/17/2020   Final    Critical Value/Significant Value, preliminary result only, called to and read back by  John Howard RN @1414 09/18/2020 hdh/lm     09/17/2020   Final    (Note)  POSITIVE for Staphylococci other than S.aureus, S.epidermidis and  S.lugdunensis, by [facility] multiplex nucleic acid test.  Coagulase-negative staphylococci are the most common venipuncture or  collection associated skin CONTAMINANTS grown in blood cultures.  Final identification and antimicrobial susceptibility testing will be  verified by standard methods.    Specimen tested with [facility] multiplex, gram-positive blood culture  nucleic acid test for the following targets: Staph aureus, Staph  epidermidis, Staph lugdunensis, other Staph species, Enterococcus  faecalis, Enterococcus faecium, Streptococcus species, S. agalactiae,  S. anginosus grp.,  S. pneumoniae, S. pyogenes, Listeria sp., mecA  (methicillin resistance) and Lucía/B (vancomycin resistance).    Critical Value/Significant Value called to and read back by Le Carmona RN. @1712. 9.18.20. BS.      09/17/2020 No growth  Final   01/21/2020 No growth  Final   01/21/2020 (A)  Final    Heavy growth  Haemophilus influenzae  Beta lactamase negative  Beta-lactamase negative Haemophilus influenzae are usually susceptible to ampicillin,   amoxacillin/clavulanic acid, levofloxacin, and 3rd generation cephalosporins, such as   ceftriaxone.     01/20/2020 No growth  Final   01/20/2020 No growth  Final   01/16/2020 (A)  Final    10,000 to 50,000 colonies/mL  Coagulase negative Staphylococcus     01/15/2020 No growth  Final   01/15/2020 (A)  Final    Cultured on the 1st day of incubation:  Staphylococcus epidermidis     01/15/2020   Final    Critical Value/Significant Value, preliminary result only, called to and read back by  Alphonse Cuba RN @ 1920. 1/16/20. AV     01/15/2020   Final    (Note)  POSITIVE for STAPHYLOCOCCUS EPIDERMIDIS and NEGATIVE for the mecA  gene (not resistant to methicillin) by [facility] nucleic acid test.  The mecA gene was not detected. Final identification and  antimicrobial susceptibility testing will be verified by standard  methods.    Specimen tested with [facility] multiplex, gram-positive blood culture  nucleic acid test for the following targets: Staph aureus, Staph  epidermidis, Staph lugdunensis, other Staph species, Enterococcus  faecalis, Enterococcus faecium, Streptococcus species, S. agalactiae,  S. anginosus grp., S. pneumoniae, S. pyogenes, Listeria sp., mecA  (methicillin resistance) and Lucía/B (vancomycin resistance).    Critical Value/Significant Value called to and read back by Dr. Leos, @2214 01/16/20.DH.     04/13/2018 No growth  Final   04/13/2018 No growth  Final   05/24/2017 No growth  Final   05/23/2017 Moderate growth Normal jaxon  Final       Last  check of C difficile  No results found for: CDBPCT    Imagin/29 CT CAP w/o contrast   IMPRESSION:   1. A small patchy opacity in the central aspect of the right lower lobe, new since 2021. This most likely represents pneumonia. Several additional patchy opacities that had been present within the lungs on the relatively recent comparison study   have resolved.  2. Moderate-sized right pleural effusion, increased in size since comparison study. A very small left pleural effusion that had been present on the comparison study has resolved.  3. No acute abnormality identified in the abdomen or pelvis.      CXR  IMPRESSION:   1. A small to moderate size region of patchy opacities in the right lung base, new. This is nonspecific and could represent pneumonia or atelectasis.  2. Blunting of the right costophrenic angle again noted and suggestive of a small to moderate-sized right pleural effusion.  3. No other significant interval change since the relatively recent comparison study.     ECHO:   Limited   Interpretation Summary  Global and regional left ventricular function is normal with an EF of 60-65%.  Global right ventricular function is normal.  Dilation of the inferior vena cava is present with abnormal respiratory  variation in diameter.  No pericardial effusion is present.

## 2021-06-30 NOTE — ED NOTES
[facility]   ED Nurse to Floor Handoff     Jim Willingham is a 66 year old male who speaks English and lives with family members,  in a home  They arrived in the ED by car from emergency room    ED Chief Complaint: Fever    ED Dx;   Final diagnoses:   S/P orthotopic heart transplant (H)   Pneumonia of right lower lobe due to infectious organism   Pleural effusion, right         Needed?: No    Allergies:   Allergies   Allergen Reactions     Beer Shortness Of Breath     Grass Shortness Of Breath     Ace Inhibitors Cough     Dust Mites Other (See Comments)     Asthma     Mold Other (See Comments)     Asthma     Penicillins Other (See Comments)     Unknown - childhood exposure    Tolerated Zosyn 9/18-9/20/2020     Sulfa Drugs Other (See Comments) and Unknown     Unknown childhood reaction   .  Past Medical Hx:   Past Medical History:   Diagnosis Date     Bariatric surgery status 2003     Benign essential hypertension 05/11/2017     Bilateral carpal tunnel syndrome 11/02/2020     Cardiomyopathy, unspecified (H) 05/08/2017     CKD (chronic kidney disease) stage 3, GFR 30-59 ml/min 05/11/2017     COPD (chronic obstructive pulmonary disease) (H) 11/02/2020     Depression 05/11/2017     Diabetes mellitus (H) 1995     Gouty arthropathy, chronic, without tophi 11/02/2020     H/O gastric bypass 05/11/2017     ICD (implantable cardioverter-defibrillator), biventricular, in situ 05/11/2017     LVAD (left ventricular assist device) present (H)      Major depression, recurrent, chronic (H) 11/02/2020     NICM (nonischemic cardiomyopathy) (H)/ EF 20% 05/11/2017    ECHO: LVEDd. 7.66 cm, Restrictive pattern , Severe mitral valve regurgitation     CECILIA (obstructive sleep apnea) 05/11/2017     Paroxysmal atrial fibrillation (H) 05/11/2017     Paroxysmal VT (H) 05/11/2017     Rotator cuff tear arthropathy of both shoulders 11/02/2020     Uncomplicated asthma      Vitamin B12  deficiency (non anemic) 05/11/2017      Baseline Mental status: WDL  Current Mental Status changes: at basesline    Infection present or suspected this encounter: yes respiratory  Sepsis suspected: No  Isolation type: No active isolations  Patient tested for COVID 19 prior to admission: YES     Activity level - Baseline/Home:  Independent  Activity Level - Current:   Stand with Assist    Bariatric equipment needed?: No    In the ED these meds were given:   Medications   sodium chloride (PF) 0.9% PF flush 3 mL (has no administration in time range)   sodium chloride (PF) 0.9% PF flush 3 mL (3 mLs Intracatheter Given 6/30/21 0022)   vancomycin (VANCOCIN) 1000 mg in dextrose 5% 200 mL PREMIX (0 mg Intravenous Stopped 6/30/21 0321)   allopurinol (ZYLOPRIM) tablet 300 mg (300 mg Oral Given 6/29/21 0859)   amitriptyline (ELAVIL) half-tab 37.5 mg ( Oral Automatically Held 7/5/21 2200)   aspirin (ASA) chewable tablet 81 mg (81 mg Oral Given 6/29/21 0859)   buPROPion (WELLBUTRIN) tablet 75 mg (75 mg Oral Given 6/29/21 2117)   calcium citrate-vitamin D (CITRACAL) 315-250 MG-UNIT per tablet 1 tablet (1 tablet Oral Given 6/29/21 2117)   cyclobenzaprine (FLEXERIL) tablet 5 mg (has no administration in time range)   gabapentin (NEURONTIN) capsule 300 mg (300 mg Oral Given 6/29/21 0858)   gabapentin (NEURONTIN) capsule 600 mg (600 mg Oral Given 6/29/21 2118)   insulin glargine (LANTUS PEN) injection 20 Units (20 Units Subcutaneous Given 6/29/21 1058)   montelukast (SINGULAIR) tablet 10 mg (10 mg Oral Given 6/30/21 0022)   mycophenolate (GENERIC EQUIVALENT) capsule 1,000 mg (1,000 mg Oral Given 6/29/21 2118)   nystatin (MYCOSTATIN) suspension 1,000,000 Units (1,000,000 Units Swish & Swallow Given 6/29/21 2119)   pantoprazole (PROTONIX) EC tablet 40 mg (40 mg Oral Given 6/29/21 2120)   polyethylene glycol (MIRALAX) Packet 17 g (has no administration in time range)   predniSONE (DELTASONE) tablet 20 mg (20 mg Oral Given 6/29/21 0184)    rosuvastatin (CRESTOR) tablet 10 mg (10 mg Oral Given 6/29/21 0858)   tacrolimus (GENERIC EQUIVALENT) capsule 5 mg (5 mg Oral Given 6/29/21 0915)   traMADol (ULTRAM) half-tab 25-50 mg (has no administration in time range)   valGANciclovir (VALCYTE) tablet 450 mg (450 mg Oral Given 6/29/21 0859)   lidocaine 1 % 0.1-1 mL (has no administration in time range)   lidocaine (LMX4) cream (has no administration in time range)   sodium chloride (PF) 0.9% PF flush 3 mL (3 mLs Intracatheter Given 6/29/21 1820)   sodium chloride (PF) 0.9% PF flush 3 mL (has no administration in time range)   medication instruction (has no administration in time range)   alum & mag hydroxide-simethicone (MAALOX) suspension 30 mL (has no administration in time range)   acetaminophen (TYLENOL) tablet 650 mg (650 mg Oral Given 6/29/21 0356)   ceFEPIme (MAXIPIME) 2 g vial to attach to  ml bag for ADULTS or 50 ml bag for PEDS (0 g Intravenous Stopped 6/30/21 0050)   glucose gel 15-30 g (has no administration in time range)     Or   dextrose 50 % injection 25-50 mL (has no administration in time range)     Or   glucagon injection 1 mg (has no administration in time range)   umeclidinium (INCRUSE ELLIPTA) 62.5 MCG/INH inhaler 1 puff (1 puff Inhalation Given 6/29/21 1058)   fluticasone-vilanterol (BREO ELLIPTA) 100-25 MCG/INH inhaler 1 puff (1 puff Inhalation Given 6/29/21 1059)   levalbuterol (XOPENEX) neb solution 0.31 mg (has no administration in time range)   azithromycin (ZITHROMAX) 500 mg in sodium chloride 0.9 % 250 mL intermittent infusion (0 mg Intravenous Stopped 6/29/21 2052)   sulfamethoxazole-trimethoprim (BACTRIM) 400-80 MG per tablet 1 tablet (has no administration in time range)   insulin aspart (NovoLOG) injection (RAPID ACTING) (1 Units Subcutaneous Given 6/30/21 0322)   tacrolimus (GENERIC EQUIVALENT) capsule 5 mg (5 mg Oral Given 6/29/21 1818)   ceFEPIme (MAXIPIME) 2 g vial to attach to  ml bag for ADULTS or 50 ml bag  for PEDS (0 g Intravenous Stopped 6/29/21 0143)   acetaminophen (TYLENOL) tablet 975 mg (975 mg Oral Given 6/28/21 2254)   vancomycin (VANCOCIN) 2,000 mg in sodium chloride 0.9 % 500 mL intermittent infusion (0 mg Intravenous Stopped 6/29/21 0535)   lactated ringers BOLUS 1,000 mL (0 mLs Intravenous Stopped 6/29/21 0703)   ipratropium - albuterol 0.5 mg/2.5 mg/3 mL (DUONEB) neb solution 3 mL (3 mLs Nebulization Given 6/29/21 0427)   lactated ringers BOLUS 500 mL (0 mLs Intravenous Stopped 6/29/21 1042)   magnesium sulfate 2 g in water intermittent infusion (0 g Intravenous Stopped 6/29/21 1342)   0.9% sodium chloride BOLUS (0 mLs Intravenous Stopped 6/29/21 1342)       Drips running?  No    Home pump  No    Current LDAs  Peripheral IV 06/28/21 Anterior;Right Upper forearm (Active)   Site Assessment WDL 06/28/21 2157   Line Status Saline locked 06/28/21 2157   Dressing Intervention New dressing  06/28/21 2157   Phlebitis Scale 0-->no symptoms 06/28/21 2157   Infiltration Scale 0 06/28/21 2157   Infiltration Site Treatment Method  None 06/28/21 2157   If infiltrated, was a Vesicant infusing? No 06/28/21 2157   Number of days: 2       Incision/Surgical Site 05/06/21 Anterior;Midline Sternum (Active)   Number of days: 55       Incision/Surgical Site 05/24/21 Anterior;Left  (Active)   Number of days: 37       Labs results:   Labs Ordered and Resulted from Time of ED Arrival Up to the Time of Departure from the ED   CBC WITH PLATELETS DIFFERENTIAL - Abnormal; Notable for the following components:       Result Value    RBC Count 3.84 (*)     Hemoglobin 11.3 (*)     Hematocrit 35.7 (*)     RDW 15.5 (*)     Absolute Lymphocytes 0.2 (*)     All other components within normal limits   COMPREHENSIVE METABOLIC PANEL - Abnormal; Notable for the following components:    Sodium 125 (*)     Glucose 487 (*)     Urea Nitrogen 39 (*)     Creatinine 1.85 (*)     GFR Estimate 38 (*)     GFR Estimate If Black 44 (*)     Albumin 3.2 (*)      Protein Total 6.5 (*)     Alkaline Phosphatase 268 (*)     All other components within normal limits   BLOOD GAS VENOUS - Abnormal; Notable for the following components:    PO2 Venous 23 (*)     All other components within normal limits   NT PROBNP INPATIENT - Abnormal; Notable for the following components:    N-Terminal Pro BNP Inpatient 1,697 (*)     All other components within normal limits   CRP INFLAMMATION - Abnormal; Notable for the following components:    CRP Inflammation 27.0 (*)     All other components within normal limits   HEPATIC PANEL - Abnormal; Notable for the following components:    Albumin 2.5 (*)     Protein Total 5.7 (*)     Alkaline Phosphatase 217 (*)     All other components within normal limits   BASIC METABOLIC PANEL - Abnormal; Notable for the following components:    Sodium 129 (*)     Glucose 354 (*)     Urea Nitrogen 41 (*)     Creatinine 1.81 (*)     GFR Estimate 39 (*)     GFR Estimate If Black 45 (*)     Calcium 8.3 (*)     All other components within normal limits   MAGNESIUM - Abnormal; Notable for the following components:    Magnesium 1.5 (*)     All other components within normal limits   GLUCOSE BY METER - Abnormal; Notable for the following components:    Glucose 385 (*)     All other components within normal limits   CBC WITH PLATELETS DIFFERENTIAL - Abnormal; Notable for the following components:    RBC Count 3.28 (*)     Hemoglobin 9.4 (*)     Hematocrit 30.6 (*)     MCHC 30.7 (*)     RDW 15.6 (*)     Nucleated RBCs 1 (*)     Absolute Metamyelocytes 0.4 (*)     Absolute Myelocytes 0.4 (*)     All other components within normal limits   GLUCOSE BY METER - Abnormal; Notable for the following components:    Glucose 350 (*)     All other components within normal limits   GLUCOSE BY METER - Abnormal; Notable for the following components:    Glucose 138 (*)     All other components within normal limits   GLUCOSE BY METER - Abnormal; Notable for the following components:     Glucose 176 (*)     All other components within normal limits   CBC WITH PLATELETS - Abnormal; Notable for the following components:    RBC Count 3.00 (*)     Hemoglobin 8.8 (*)     Hematocrit 28.3 (*)     MCHC 31.1 (*)     RDW 15.9 (*)     All other components within normal limits   GLUCOSE BY METER - Abnormal; Notable for the following components:    Glucose 179 (*)     All other components within normal limits   GLUCOSE BY METER - Abnormal; Notable for the following components:    Glucose 163 (*)     All other components within normal limits   BASIC METABOLIC PANEL - Abnormal; Notable for the following components:    Sodium 130 (*)     Glucose 157 (*)     Urea Nitrogen 47 (*)     Creatinine 2.24 (*)     GFR Estimate 30 (*)     GFR Estimate If Black 35 (*)     Calcium 8.3 (*)     All other components within normal limits   HEPATIC PANEL - Abnormal; Notable for the following components:    Albumin 2.2 (*)     Protein Total 5.1 (*)     Alkaline Phosphatase 165 (*)     All other components within normal limits   INR - Abnormal; Notable for the following components:    INR 1.27 (*)     All other components within normal limits   CBC WITH PLATELETS DIFFERENTIAL - Abnormal; Notable for the following components:    RBC Count 3.04 (*)     Hemoglobin 8.9 (*)     Hematocrit 28.1 (*)     RDW 15.9 (*)     Nucleated RBCs 2 (*)     Absolute Lymphocytes 0.7 (*)     Absolute Metamyelocytes 0.3 (*)     Absolute Myelocytes 0.1 (*)     All other components within normal limits   GLUCOSE BY METER - Abnormal; Notable for the following components:    Glucose 167 (*)     All other components within normal limits   ROUTINE UA WITH MICROSCOPIC - Abnormal; Notable for the following components:    Ketones Urine Trace (*)     Protein Albumin Urine 50 (*)     All other components within normal limits   BLOOD CULTURE - Abnormal; Notable for the following components:    Culture Micro   (*)     Value: Cultured on the 2nd day of incubation:  Gram  positive cocci in clusters      All other components within normal limits   LACTIC ACID WHOLE BLOOD   TROPONIN I   SARS-COV-2 (COVID-19) VIRUS RT-PCR   PROCALCITONIN   CMV DNA QUANTIFICATION   EBV DNA PCR QUANTITATIVE WHOLE BLOOD   LEGIONELLA PNEUMONIA ANTIGEN URINE   TACROLIMUS LEVEL   CMV DNA QUANTIFICATION   CRYPTOCOCCUS ANTIGEN   FUNGAL ANTIBODY BY IMMUNODIFFUSION   HISTOPLASMA CAPSULATUM ANTIGEN   TACROLIMUS LEVEL   MAGNESIUM   PHOSPHORUS   LACTIC ACID WHOLE BLOOD   LACTATE DEHYDROGENASE   GLUCOSE MONITOR NURSING POCT   GLUCOSE MONITOR NURSING POCT   IP ASSIGN PROVIDER TEAM TO TREATMENT TEAM   PULSE OXIMETRY NURSING   CARDIAC CONTINUOUS MONITORING   STRICT INTAKE AND OUTPUT   PERIPHERAL IV CATHETER   DAILY WEIGHTS   INTAKE AND OUTPUT   OBTAIN MEDICAL RECORDS   DOCUMENT   PATIENT EDUCATION   TELEMETRY MONITORING CARDIOLOGY ADULT   VITAL SIGNS AND PAIN ASSESSMENT   PULSE OXIMETRY NURSING   PERIPHERAL IV CATHETER   ACTIVITY   NOTIFY PROVIDER   PATIENT EDUCATION   NOTIFY PHYSICIAN   IP CARBOHYDRATE COUNTING BY NURSING   PATIENT EDUCATION   ASSESS FOR HYPOGLYCEMIA SYMPTOMS   NOTIFY PHYSICIAN   BLOOD CULTURE   RESPIRATORY PANEL PCR - NP SWAB   STREP PNEUMO AGN URINE OR CSF   ASPERGILLUS GALACTOMANNAN ANTIGEN   1,3-BETA D GLUCAN FUNGITELL   METHICILLIN RESIST/SENS S. AUREUS PCR   HISTOPLASMA CAPSULATUM AGN NON BLOOD   BLASTOMYCES AGN QUANT EIA NON BLOOD   BLASTOMYCES AGN QUANT EIA BLOOD   FUNGUS CULTURE BLOOD   CELL COUNT WITH DIFFERENTIAL FLUID   FLUID CULTURE AEROBIC BACTERIAL   GLUCOSE FLUID   GRAM STAIN   LACTATE DEHYDROGENASE FLUID   PROTEIN FLUID   ANAEROBIC BACTERIAL CULTURE   FUNGUS CULTURE   PH FLUID   AFB STAIN NON BLOOD   AFB CULTURE NON BLOOD   SPUTUM CULTURE AEROBIC BACTERIAL   GRAM STAIN       Imaging Studies:   Recent Results (from the past 24 hour(s))   Echo Limited    Narrative    [id number]  [id number]  [id number]  [id number]^KAROL^ALYCIA^YANELI     [facility]  [facility]  Echocardiography Laboratory  [address] [place] [id number]     Name: LOIS FULLER  MRN: [id number]  : 1955  Study Date: 2021 07:07 AM  Age: 66 yrs  Gender: Male  Patient [facility]: [facility]  Reason For Study: Transplant - Heart  Ordering Physician: ALYCIA ROBERSON  Performed By: Delmi Darby RDCS     BSA: 2.0 m2  Height: 68 in  Weight: 198 lb  HR: 120  BP: 102/69 mmHg  ______________________________________________________________________________  Procedure  Limited Portable Echo Adult.  ______________________________________________________________________________  Interpretation Summary  Global and regional left ventricular function is normal with an EF of 60-65%.  Global right ventricular function is normal.  Dilation of the inferior vena cava is present with abnormal respiratory  variation in diameter.  No pericardial effusion is present.  ______________________________________________________________________________  Left Ventricle  Left ventricular size is normal. Global and regional left ventricular function  is normal with an EF of 60-65%.     Right Ventricle  Global right ventricular function is normal. Mild right ventricular dilation  is present.     Mitral Valve  The mitral valve is normal.     Aortic Valve  Aortic valve is normal in structure and function.     Tricuspid Valve  The tricuspid valve is normal.     Vessels  Dilation of the inferior vena cava is present with abnormal respiratory  variation in diameter. IVC diameter >2.1 cm collapsing <50% with sniff  suggests a high RA pressure estimated at 15 mmHg or greater.     Pericardium  No pericardial effusion is present.     Compared to Previous Study  This study was compared with the study from 21 . Biventricular function is  stable.     ______________________________________________________________________________  Doppler Measurements & Calculations  TR max sloane: 151.0 cm/sec  TR max P.1 mmHg      ______________________________________________________________________________  Report approved by: Shayne Page 06/29/2021 08:33 AM         US Chest Pleural Effusion Imaging    Narrative    US chest pleural effusion, 6/29/2021    INDICATION: Pleural effusion, right side. Evaluate pocket size for  thoracentesis.    COMPARISON: Same day CT.    TECHNIQUE: Grayscale sonographic evaluation of the right chest for  pleural effusion.    FINDINGS:  Moderately sized anechoic fluid collection in the right pleural space,  measuring 10 cm in depth.      Impression    IMPRESSION:  Moderately sized right-sided pleural effusion.    I have personally reviewed the examination and initial interpretation  and I agree with the findings.    JOANNE MCMAHAN MD   POC US Guide for Thorcentesis    Impression    Limited chest ultrasound was performed and demonstrated an adequate fluid collection on the right side of the chest. Doppler of the skin demonstrated an area at this site without significant vasculature. A thoracentesis at this site was subsequently performed.    Corey Delgadillo MD     XR Chest Port 1 View    Narrative    EXAM: XR CHEST PORT 1 VIEW  6/29/2021 6:07 PM      HISTORY: Evaluate for pneumothorax post right thoracentesis    COMPARISON: CT: 6/29/2021    FINDINGS: Single view of the chest. Postoperative chest with median  sternotomy wires. Fracture of the superior most and second median  sternotomy wires, better characterized on same-day CT. Fragment of the  cardiac lead is again noted. No pneumothorax. Small right and probable  left pleural effusions. Normal cardiac silhouette. Bilateral  interstitial haziness. Visualized upper abdomen is unremarkable. No  aggressive osseous lesions.      Impression    IMPRESSION:  1. No pneumothorax.  2. Small right and probable left pleural effusion with bilateral  interstitial haziness, concerning for mild pulmonary edema  3. Fracture of the superior most and second median  "sternotomy wires,  better characterized on CT.    I have personally reviewed the examination and initial interpretation  and I agree with the findings.    JOANNE MCMAHAN MD       Recent vital signs:   BP (!) 144/67   Pulse 102   Temp (P) 100.5  F (38.1  C)   Resp 16   Ht 1.727 m (5' 8\")   Wt 89.9 kg (198 lb 1.6 oz)   SpO2 (!) 63%   BMI 30.12 kg/m      [place] Coma Scale Score: 15 (06/28/21 2158)       Cardiac Rhythm: Normal Sinus  Pt needs tele? Yes  Skin/wound Issues: None    Code Status: Full Code    Pain control: pt had none    Nausea control: pt had none    Abnormal labs/tests/findings requiring intervention:     Family present during ED course? No   Family Comments/Social Situation comments:     Tasks needing completion: Needs sputum sample.    Lisa Rush RN    [phone] [facility] ED      "

## 2021-06-30 NOTE — PROGRESS NOTES
[facility]   Cardiology II Service / Advanced Heart Failure  Daily Progress Note      Patient: Jim Willingham  MRN: [id number]  Admission Date: 6/28/2021  Hospital Day # 2    Assessment and Plan: Jim Willingham is a 66 year old male with history of NICM s/p OHT (5/6/21) postoperative course was complicated by right pleural air leak with prolonged chest tube, COPD, CKDIII, DMII, anemia who presents with SOB, cough, fevers/chills, fatigue found to have sepsis felt secondary to CAP.    Today's Plan:  -repeat blood cx x2 today, suspect contaminated 6/28 sample  -follow all cultures and studies  -follow-up ID and pulm recs  -repeat tacrolimus level in AM - MUST BE 12 HR TROUGH  -renally dose gabapentin and valcyte  -q4 hr while in ED including o2 liter flow (if on oxygen)  Addendum: donor cultures finalized 5/12 and negative    # Sepsis likely secondary to CAP/RLL infiltrate  # Chronic immunosupression  # Moderate pleural effusion  # Prior right pleural air leak s/p prolonged chest tube  # 1 of 2 blood cultures with staph epi  P/w two days of non-productive cough, SOB, fevers/chills, fatigue with + sick contacts at home (wife with URI). Febrile (102.7), tachycardic (120s-150s), new/worse RLL consolidation with moderate effusion. Pan CT unrevealing for alternative source. Low suspicion for COPD exacerbation. COVID negative. Fungal unlikely given acuity. RVP negative.   - transplant ID consulted   - continue cefepime/vanc   - changed doxycycline to azithromycin 6/29  - pulmonary consulted   - diagnostic and therapeutic thoracentesis 6/29, 270mL removed   - may need bronch  - blood cultures 1 of 2 bottles 6/28 with staph epi, suspect contaminant  - repeat blood cultures 6/30 pending  - pleural fluid cx pending, ngtd  - strep Ag negative  - histo and blasto pending  - aspergillus pending  - fungitell and fungal blood cx pending  - MRSA nares negative  - EBV/CMV negative  - repeat chest CT in 2-3 weeks per ID  to assure no e/o fungal PNA (~7/14-7/21)    # Status post OHT on 5/6/21, history of NICM and HMII LVAD  # Chronic immunosuppression  # Sinus tachycardia, exacerbated in the setting of sepsis   * TTE 6/7/21: EF 65-60%, RV normal, IVC normal  * RHC 6/21/21: RA 9 PA 32/20(25) PCWP 13 Kee CO/CI 6.1/3.1  * Angiogram deferred due to renal dysfunction    Graft Function:   --Volume: euvolemic, defer further IVF  --BP: wnl  --HR: improved to baseline 90s-100s with IVF    Immunosuppression:   --prednisone 20 mg daily per outpt, taper per protocol   --Cellcept 1000 mg BID (decreased 6/21 due to abnormal CXR per Dr Montgomery)  --tacrolimus increased to 5 mg bid on 6/29 for level 6.8, repeat trough in AM (goal 10-12)    Serostatus: CMV: D+/R+, EBV: D+/R+, Toxo: D-/R-    Prophylaxis:   --CAV: aspirin 81 mg, rosuvastatin 10 mg daily  --Thrush: Nystatin   --PCP: Bactrim single strength EOD (renally dosed)  --GI: Protonix   --Osteoporosis: calcium/vitamin D   --CMV: Valcyte 450 mg EOD x 3 mos renally dosed    # Hypovolemic hyponatremia, improved  Na 125 on admission from wnl last check, improved to 129 with IVF. Also hyperglycemia BS 300s so corrected, Na 133. Na 130 today.   - daily BMP    # CKD stage III  Cr 1.85 on admission from recent baseline ~2.   - daily BMP     # DMII  Recent BS 300s on BMPs, 487 on admission, now improved < 200.   - endocrine diabetes team managing    # Anemia   # Thrombocytopenia, resolved  Hgb stable, plts WNL, no signs or symptoms of active bleeding. Multifactorial.      # Depression  - PTA bupropion and amitriptyline on hold     FEN: Regular diet  PROPHY:  Deferred  LINES:  PIV  DISPO:  pending  CODE STATUS:  Full code    Kiesha Wilder DNP, NP-C  Advanced Heart Failure/Cardiology II Service  Pager [phone] [facility] [id number]      I have seen and examined the patient as part of a shared visit with Kiesha Wilder NP.  I agree with the note above. I reviewed today's vital signs and medications. I have reviewed  "and discussed with the advanced practice provider their physical examination, assessment, and plan   Briefly, recent heart transplant patient admitted with sepsis 2/2 pneumonia  My key history/exam findings are: Pt with significant clincial improvement compared to yesterday.  SOB improved but not yet back to baseline.  Hemodynamically stable.  Grossly euvolemic on exam this afternoon.  The key management decisions made by me:  Continue current dose of prednisone, MMF at 1000mg bid (recently reduced), tacrolimus goal ~10.  Antibiotics narrowed as per transplant ID recs.      Rose Conte MD  Section Head - Advanced Heart Failure, Transplantation and Mechanical Circulatory Support  Director - [facility]  Associate Professor of Medicine, [facility]    ================================================================    Subjective/24-Hr Events:   Last 24 hr care team notes reviewed. Feeling better today, more alert. Still shortness of breath with little activity. Cough remains non productive. No fevers overnight. No longer requiring oxygen.     ROS:  4 point ROS including respiratory, CV, GI and  (other than that noted in the HPI) is negative.     Medications: Reviewed in EPIC.     Physical Exam:   BP (!) 144/67   Pulse 105   Temp (P) 100.5  F (38.1  C)   Resp 16   Ht 1.727 m (5' 8\")   Wt 89.9 kg (198 lb 1.6 oz)   SpO2 97%   BMI 30.12 kg/m      GENERAL: Appears mild shortness of breath at rest, improved. Seated at bedside.   HEENT: Eye symmetrical, no discharge or icterus bilaterally. Mucous membranes moist and without lesions.  NECK: Supple, JVD just above clavicle at 90 degrees. .   CV: Tachycardic and regular, +S1S2, no murmur, rub, or gallop.   RESPIRATORY: Respirations regular, even, and unlabored. Lungs dim throughout, no overt crackles or wheezes.   GI: Soft, obese and non distended with normoactive bowel sounds present in all quadrants. No " tenderness, rebound, guarding.   EXTREMITIES: No peripheral edema. 2+ bilateral pedal pulses.   NEUROLOGIC: Alert and oriented x 3. No focal deficits.   MUSCULOSKELETAL: No joint swelling or tenderness.   SKIN: No jaundice. No rashes or lesions. Warm to touch.     Labs:  CMP  Recent Labs   Lab 06/30/21 0339 06/29/21 0631 06/28/21 2208 06/25/21  1133 06/24/21  0905   * 129* 125* 134 134   POTASSIUM 4.8 5.3 5.3 4.5 4.3   CHLORIDE 101 100 94 101 102   CO2 25 23 25 25 23   ANIONGAP 4 6 6 8 9   * 354* 487* 314* 320*   BUN 47* 41* 39* 48* 41*   CR 2.24* 1.81* 1.85* 2.07* 2.23*   GFRESTIMATED 30* 39* 38* 33* 30*   GFRESTBLACK 35* 45* 44* 38* 35*   QAMAR 8.3* 8.3* 8.6 8.5 8.0*   MAG 2.2 1.5*  --   --  1.8   PHOS 2.8  --   --   --  3.5   PROTTOTAL 5.1* 5.7* 6.5*  --  5.8*   ALBUMIN 2.2* 2.5* 3.2*  --  2.9*   BILITOTAL 0.5 0.6 0.5  --  0.3   ALKPHOS 165* 217* 268*  --  197*   AST 10 19 38  --  31   ALT 30 47 70  --  40       CBC  Recent Labs   Lab 06/30/21 0339 06/29/21 2024 06/29/21  0912 06/28/21 2208   WBC 7.5 7.8 8.0 5.1   RBC 3.04* 3.00* 3.28* 3.84*   HGB 8.9* 8.8* 9.4* 11.3*   HCT 28.1* 28.3* 30.6* 35.7*   MCV 92 94 93 93   MCH 29.3 29.3 28.7 29.4   MCHC 31.7 31.1* 30.7* 31.7   RDW 15.9* 15.9* 15.6* 15.5*    164 240 243     Time/Communication  I personally spent a total of 35 minutes. Of that 25 minutes was counseling/coordination of patient's care. Plan of care discussed with patient. See my note above for details.    Patient discussed with Dr. Conte.

## 2021-06-30 NOTE — PROGRESS NOTES
CLINICAL NUTRITION SERVICES - ASSESSMENT NOTE     Nutrition Prescription    RECOMMENDATIONS FOR MDs/PROVIDERS TO ORDER:  Order kcal counts if oral intake continues to be less than 50% of meals.     Malnutrition Status:    Non-severe malnutrition in the context of chronic illness    Recommendations already ordered by Registered Dietitian (RD):  Oral supplements  Snacks    Future/Additional Recommendations:  1. Continue regular diet order, as per team. Encourage intake at meals and intake of snacks/oral supplements.   2. Continue calcium/vitamin D, as ordered, since pt is on prednisone.   3. Monitor glycemic control. DM 2. Hgb A1c of 6.2 on 1/7/21, 5.1 on 5/4/21. BG was 144 on 6/30. H/o hypoglycemia. Endo following.   4. Monitor lytes (Phos, Mg++, and K+) for refeeding syndrome. If lytes trend low, aggressively replace. Note, trace ketones per urinalysis on 6/30.   5. Monitor K+ trends (K+ was 4/8 on 6/30) and potential need to adjust diet order.   6. Consider checking zinc status and supplement for short course if low. Note, zinc may be low due to possible infection.   7. Monitor need for bowel regimen, unknown date of last stool.   8. Order the following (Sylvester-en-y Gastric Bypass) if pt agreeable:    Multivitamin/minerals: adult dose 2 times daily    Vitamin B12: sublingual form of at least 500 mcg daily or injection of 1000 mcg monthly. Pt received B12 on 5/13/21. Had been ordered to receive vitamin B12 Q 30 days. Pt's vitamin B12 was 734 on 5/13/21.   9. If TFs are needed, rec place feeding tube (FT) via radiology due to RNY hx and initiate TFs, Osmolite 1.5 (concentrated, maintenance TF formula) and order Prosource TF modular (1 pkt daily). Initiate TFs at 15 mL/hr, advancing rate by 10 mL Q 8 hrs (or per provider discretion, pending hemodynamic stability) to goal rate of 60 mL/hr. Osmolite 1.5 Pete at goal of 60 mL/hr (1440ml/day) will provide: 2160+ kcals, 90+ g PRO, 1097 ml free H20, 293 g CHO, and 0 g fiber  "daily. Each packet of ProSource TF modular provides 40 kcals and 11 g protein.                 If begin tube feeds:               - Flush FT with 30 mL water Q 4 hrs for patency. Rec provider adjust free water flushes as needed, pending fluid status.              - Ensure K+/Mg++/Phos labs are ordered daily until TFs advance to goal infusion to evaluate for sx of refeeding with nutrition received. If lytes trend low, aggressively replace lytes.                  - If not already ordered, order a multivitamin/mineral (certavite 15 mL/day via FT) to help ensure micronutrient needs being met with suspected hypermetabolic demands and potential interruptions to TF infusions.              - Monitor TF and possible need to adjust nutrition support regimen if necessary, pending medical course and nutrition status.                  - Monitor need to check a metabolic cart study.                - Send a nutrition consult for \"Registered Dietitian to Order TF per Medical Nutrition Therapy Guidelines\" if desire RD to order TFs.               - If K+ trends high and if needs Novasource Renal TF (lower in K+) then rec a goal rate of 45 mL/hr.      REASON FOR ASSESSMENT  Jim Willingham is a/an 66 year old male assessed by the dietitian for Provider Order - poor po intake, anorexia and pending Admission Nutrition Risk Screen      NUTRITION/ADDITIONAL HISTORY  Per RD note 5/26/21: \"Clear Liquids since 5/26 am. NGT is currently clamped. Has orders for Glucerna at 14:00 (chocolate and butter pecan). Has a prn order for Special K bars. Previously on a regular diet 5/20-5/26 [diet advanced to regular on 5/27]. Per % intake flowsheets, pt consuming 100% with a good appetite 5/19, 75% with a good appetite 5/20, 100% with a good appetite 5/21, % with a good appetite 5/22, and % with a good appetite 5/23. [employer] (meal ordering system) indicates food/beverages sent 5/23-5/25 totaled 1178 kcals and 66 g protein daily on " "average. Estimated needs are 4911-9350 kcals/day (30-35 kcals/kg) and  grams protein/day (1.3-1.5+ grams of pro/kg). Three meals were ordered on 5/23 and then only one meal 5/24 and 5/25. Pt was sleeping when attempting to see, NGT in place. Noticed a fridge in his room and had a surplus of Glucerna in his room.\"     Per H & P, \"History of NICM s/p OHT (5/6/21) postoperative course was complicated by right pleural air leak with prolonged chest tube, COPD, CKDIII, DMII, anemia who presents with SOB, cough, fevers/chills, fatigue found to have sepsis likely secondary to CAP. Patient reports approximately 2 days of nonproductive cough, rhinorrhea, fevers and chills, progressive dyspnea now at rest, generalized fatigue, poor p.o. intake, anorexia and intermittent dizziness and generalized dull headache He reports that his weight is been around 190 pounds which is lower than usual for him.  He denies nausea or vomiting, diarrhea or abdominal pain, notes chronic anosmia.\" Pt was ordered to take, noting, bumex, citracal, vitamin B12 (1000 mcg every 30 days), novolog, Lantus, protonix, miralax, potassium, prednisone, and senna PTA.     Per discussion with pt, he does not always have an appetite. States he has been eating less in particular over the last three to four days. Per pt, \"eating nothing.\" Having early satiety and not feeling well with a lack of appetite. Pt likes and consumes chocolate Premier Protein at home intermittently. Does not routinely take a multivitamin with minerals or vitamin B12. No known food allergies as per discussion with pt/family.      CURRENT NUTRITION ORDERS  Diet: Regular since 6/30. NPO for a portion of the day 6/29.   Intake/Tolerance: Pt did not order any food on 6/29 and ordered two blueberry muffins with a cup of milk this morning for breakfast.     LABS  Labs reviewed    MEDICATIONS  Medications reviewed    ANTHROPOMETRICS  Height: 172.7 cm (5' 8\")  Most Recent Weiht: 89.9 kg (198 " lb 1.6 oz)    IBW: 70 kg   BMI: Obesity Grade I BMI 30-34.9, at the lowest end of this range.   Wt Hx: 118.4 kg (2/13/20), 114.2 kg (6/24/20), 106.2 kg (8/7/20), 103.9 kg (11/13/20), 94.8 kg (1/12/21), 99.5 kg (2/8/21 at time of admit during which he received a Tx), 100.2 kg (2/15/21), 98.2 kg (2/22), 100 kg (3/2), 99.7 kg (3/11), 99.9 kg (4/9), 100.4 kg (5/26/21), 89.9 kg (6/28/21, admit) - Weight fluctuations, likely due to fluid shifts and post-op status but actual wt loss as well. Pt has lost 10.5% of body wt over the last month.   Dosing Weight: 75 kg (adjusted, based on only wt so far this admission of 89.9 kg on 6/28)    ASSESSED NUTRITION NEEDS (for inpatient hospital stay)  Estimated Energy Needs: 7597-6433 kcals/day (25 - 30 kcals/kg)  Justification: Increased needs with stress factors, decreased needs with wt status.   Estimated Protein Needs:  grams protein/day (1.2 - 1.5 grams of pro/kg)  Justification: Increased needs with stress factors, pending renal function  Estimated Fluid Needs: 3469-6582 mL/day (25 - 30 mL/kg)   Justification: Maintenance needs or per team, pending fluid status    PHYSICAL FINDINGS/OTHER FINDINGS  See malnutrition section below.  GI: Last stool was PTA, per discussion with pt.   General: Dry mucous membranes    MALNUTRITION  % Intake: < 75% for > 7 days (non-severe), on average  % Weight Loss: > 5% in 1 month (severe) - Suspect severe actual wt loss.   Subcutaneous Fat Loss: None observed  Muscle Loss: None observed  Fluid Accumulation/Edema: None noted  Malnutrition Diagnosis: Non-severe malnutrition in the context of chronic illness    NUTRITION DIAGNOSIS  Inadequate oral intake related to decreased appetite with not feeling well and early satiety as evidenced by pt report of consuming nothing for the last three to four days.     INTERVENTIONS  Implementation  Nutrition Education: Encouraged intake of tolerated foods and beverages. Rec snacks/supplements as small,  frequent meals. Explained room service menu changes.   Medical food supplement therapy: Ordered Glucerna, butter pecan at 10:00 and vanilla at 14:00. Special K bar at supper daily.     Goals  Patient to consume % of nutritionally adequate meal trays TID, or the equivalent with supplements/snacks.     Monitoring/Evaluation  Progress toward goals will be monitored and evaluated per protocol.       Nutrition will continue to follow     Sana Costello, MS, RD, LD, [facility]   6C Pgr: [phone]

## 2021-06-30 NOTE — CONSULTS
Post Transplant Patient Social Work Assessment     Patient Name: Jim Willingham  : 1955  Age: 66 year old  MRN: [id number]  Date of transplant: 21    Patient known to me from follow up in the transplant program.  Admitted on 21 for SOB, cough, fevers/chills, fatigue found to have sepsis. Seen today in order to update assessment.      Presenting Information   Living Situation: Pt lives with his spouse, Cate, and adopted great granddaughter, Graham  Functional Status: Discharged to home post-transplant on 21. Has been at home with home care and home PT and OT, but independent with ADLs. Uses a walker to get around at home.  Cultural/Language/Spiritual Considerations: None    Support System  Primary Support Person Patient's wife-Cate  Other support:  Sister in [place]  Plan for support in immediate post-hospital period: Wife is currently providing 24-hour care/supervision post-transplant.    Health Care Directive  Decision Maker: patient  Alternate Decision Maker: Wife primary HCA with Sister as secondary  Health Care Directive: Copy in Chart    Mental Health/Coping:   History of Mental Health: Anxiety  History of Chemical Health: History of ETOH and Cannabis, but not in recent years. No use currently.  Current status: No concerns  Coping: Good coping skills  Services Needed/Recommended: None identified    Financial   Income: SSD, great granddaughters SSDI, adoption assistance   Impact of transplant on income: minimal  Insurance and medication coverage: Yes  Financial concerns: None  Resources needed: None identified    Discharge Plan   Patient and family discharge goal: Home with family when medically ready  Barriers to discharge: Medical condition    Education provided by SW: Social Work role inpatient setting, availability of support groups    Assessment and recommendations and plan:  Writer covering for patient's usual transplant SWer, but familiar with patient through support group.  Patient anticipates being in the hospital for a few days while being worked up for infection. He seems to have good coping skills, but complains of feeling very tired. Will join support group if feeling okay. No SW discharge planning needs identified. Currently, using home care with RN, PT and OT at home.

## 2021-06-30 NOTE — CONSULTS
Pulmonary consult    DOS: 6/30/2021     Reason for consult: infiltrate on CT    HPI: 66 year-old male admitted on 6/28. Status post heart transplant on 5/6/21 for non-ischemic cardiomyopathy; had LVAD and ICD removed during the procedure. Was hospitalized from 2/8/21 through 5/31/21. Hospitalization complicated by prolonged air leak in right lung and post-operative ileus; chest tube removed without complication on 5/23. Had CT chest/abdomen/pelvis performed on 5/18 for leukocytosis. CXR on 5/25 showing evolving LLL opacity and started on pip-tazo (completed 7 day course). Discharged to home.     Presents with 1-2 days of worsening dyspnea, non-productive cough, rhinorrhea, and Tmax of 101. CT chest performed on 6/28 showed patchy opacity in central RLL with moderate right-sided pleural effusion. Discussed case with cardiology team yesterday, and due to location of infiltrate recommended starting with diagnostic/therapeutic thoracentesis and empiric treatment for hospital-acquired organisms.     Thoracentesis performed yesterday with 250 mL bloody output. Labs consistent with exudate but not suggestive of empyema. Has been on room air since arrival with SpO2 in the high 90's.    Overall feeling much improved from time of admission. Breathing more comfortably, feels physically stronger, and mentation is clearer.     PMH/PSH:  1. COPD  2. CECILIA   3. Status post gastric bypass  4. Elevated left hemidiaphragm   5. Non-ischemic cardiomyopathy status post transplant 5/6/21  6. Type 2 DM  7. Depression  8. Stage III CKD    Immunosuppression/Prophylaxis:   1. Prednisone 20 mg daily  2.  mg bid  3. Tacrolimus 5/4 bid  4. TMP-SMX  5. Nystatin  6. Valacyclovir     Current antibiotics:   1. Azithromycin  2. Cefepime  3. Vancomycin    Pulmonary medications:   1. Fluticasone-vilanterol  2. Montelukast  3. Umeclidinium    SH: Recent sick contacts (granddaughter, wife). No recent travel. Living at home. No mold or recent water  "damage.    FH: Reviewed and not relevant to presenting complaint    ROS: complete ROS performed and otherwise normal    Exam:  BP (!) 144/67   Pulse 102   Temp (P) 100.5  F (38.1  C)   Resp 16   Ht 1.727 m (5' 8\")   Wt 89.9 kg (198 lb 1.6 oz)   SpO2 (!) 63%   BMI 30.12 kg/m    General: older male, laying in bed, appears comfortable, needs help sitting upright  HEENT: not wearing supplemental oxygen  Pulmonary: breathing comfortably on ambient air, decreased breath sounds at bases R > L, no wheezing  Cardiac: regular rhythm, normal rate  Abdomen: soft, non-tender  Skin: warm, dry  Neurologic: alert, oriented, answers all questions appropriately     Pleural fluid (6/29): Gram stain without organisms. PH 7.6. Glucose 169. Protein 2.9 (serum 5.1).  (serum 184). Cell count 770 (42% neutrophils). Cultures in process.      Histoplasma Ag in process  Fungal Ab in process  CMV PCR in process  Cryptococcal Ag negative  Respiratory viral panel negative  Blastomyces Ag in process  S.pneumo and Legionella Ag in process    Assessment: 66 year-old immunosuppressed male with recent heart transplant on 5/6 who presents with fever, dyspnea, and encephalopathy in the setting of new right-sided infiltrate on CT and moderate exudative pleural effusion. Overall picture is consistent with hospital-acquired pneumonia complicated by parapneumonic effusion. Clinically improving with empiric broad-spectrum antibiotics and therapeutic thoracentesis. Would defer bronchoscopy for the time being--suspect lavage specimen would be low-yield based on location. If he doesn't continue to improve will re-evaluate.     Recommendations:   -- no role for bronchoscopy at present time but will continue to assess  -- agree with empiric broad-spectrum antibiotics while awaiting culture data  -- no wheezing suggestive of simultaneous COPD exacerbation    Dann Bryant MD  Pulmonary fellow  "

## 2021-06-30 NOTE — PROGRESS NOTES
"Endocrine Progress Note  Patient: Jim Willingham   MRN: [id number]  Date of Service: 06/30/2021    Subjective:  Patient is feeling much better today - feels \"like himself again\". Appetite remains poor - had only one nutritional shake this morning. No hypoglycemia overnight or today.  As noted in consult H&P, patient had not been checking sugars or taking insulin in couple of weeks prior to admission. Wife said that it was due to CGM not being compatible with patient's new phone, but patient today mentioned he was having difficulty with fingersticks.     Physical Examination:  /71   Pulse 107   Temp 98.7  F (37.1  C) (Oral)   Resp 16   Ht 1.727 m (5' 8\")   Wt 89.9 kg (198 lb 1.6 oz)   SpO2 99%   BMI 30.12 kg/m    General: Laying in bed comfortably, in no acute distress  Respiratory: Breathing comfortably on room air, answering questions in complete sentences without appearing short of breath  Neuro: Alert and oriented, answering questions appropriately. Still maybe slightly confused - said to provider team that he \"saw us on TV\" yesterday when we introduced ourselves  Psych: Appropriate mood, affect    Medications:  Reviewed    Endocrine Labs:  Glucose 176 -> 179 (given 1 unit) -> 163 (1 unit) -> 157 (1 unit) -> 144 (1 unit)     Assessment and plan:    Type II Diabetes  Hx of postprandial hypoglycemia  Blood sugars have been stable between 144 and 176 since initiation of insulin inpatient - was NPO until this AM. Patient now has diet orders but appetite remains poor - had one nutrition drink this AM. Will initiate meal time lantus today as detailed below.  - Add meal time novolog - 1 unit for every 15 g of carbs - ordered for you  - Continue glargine 20 units qAM  - Continue moderate dose sliding scale insulin (1 unit for every 50 over 140)  - glucose checks before meals, at bedtime, at 0200  - hypoglycemia protocol  - If dose of prednisone is changed during hospitalization or at time of discharge, " please let endocrine team know as this will alter plan for insulin  - Diabetes educator consult placed to ensure patient has a plan for monitoring glucose and administering insulin at time of discharge (had not been taking insulin prior to admission)    Patient seen and examined with attending, Dr. Felicita Darby MD  Internal Medicine PGY-1    Attending tie-in note  I saw the patient with resident Dr. Darby and directly examined patient and discussed. Agree above note and plan.       Jacque Mims MD  Staff Physician  Endocrinology and Metabolism  [facility]  License: [place] [id number]  Pager: [phone]

## 2021-07-01 ENCOUNTER — APPOINTMENT (OUTPATIENT)
Dept: GENERAL RADIOLOGY | Facility: CLINIC | Age: 66
DRG: 871 | End: 2021-07-01
Attending: NURSE PRACTITIONER
Payer: COMMERCIAL

## 2021-07-01 ENCOUNTER — APPOINTMENT (OUTPATIENT)
Dept: PHYSICAL THERAPY | Facility: CLINIC | Age: 66
DRG: 871 | End: 2021-07-01
Payer: COMMERCIAL

## 2021-07-01 LAB
ALBUMIN SERPL-MCNC: 2 G/DL (ref 3.4–5)
ALP SERPL-CCNC: 178 U/L (ref 40–150)
ALT SERPL W P-5'-P-CCNC: 35 U/L (ref 0–70)
ANION GAP SERPL CALCULATED.3IONS-SCNC: 6 MMOL/L (ref 3–14)
ANISOCYTOSIS BLD QL SMEAR: SLIGHT
AST SERPL W P-5'-P-CCNC: 53 U/L (ref 0–45)
BASOPHILS # BLD AUTO: 0 10E9/L (ref 0–0.2)
BASOPHILS NFR BLD AUTO: 0 %
BILIRUB DIRECT SERPL-MCNC: 0.1 MG/DL (ref 0–0.2)
BILIRUB SERPL-MCNC: 0.3 MG/DL (ref 0.2–1.3)
BUN SERPL-MCNC: 42 MG/DL (ref 7–30)
BURR CELLS BLD QL SMEAR: SLIGHT
CALCIUM SERPL-MCNC: 8 MG/DL (ref 8.5–10.1)
CHLORIDE SERPL-SCNC: 103 MMOL/L (ref 94–109)
CO2 SERPL-SCNC: 23 MMOL/L (ref 20–32)
CREAT SERPL-MCNC: 2.09 MG/DL (ref 0.66–1.25)
DIFFERENTIAL METHOD BLD: ABNORMAL
EOSINOPHIL # BLD AUTO: 0 10E9/L (ref 0–0.7)
EOSINOPHIL NFR BLD AUTO: 0 %
ERYTHROCYTE [DISTWIDTH] IN BLOOD BY AUTOMATED COUNT: 16 % (ref 10–15)
GALACTOMANNAN AG SERPL QL IA: NEGATIVE
GALACTOMANNAN AG SERPL-ACNC: 0.06
GFR SERPL CREATININE-BSD FRML MDRD: 32 ML/MIN/{1.73_M2}
GLUCOSE BLDC GLUCOMTR-MCNC: 169 MG/DL (ref 70–99)
GLUCOSE BLDC GLUCOMTR-MCNC: 184 MG/DL (ref 70–99)
GLUCOSE BLDC GLUCOMTR-MCNC: 368 MG/DL (ref 70–99)
GLUCOSE BLDC GLUCOMTR-MCNC: 389 MG/DL (ref 70–99)
GLUCOSE BLDC GLUCOMTR-MCNC: 412 MG/DL (ref 70–99)
GLUCOSE SERPL-MCNC: 185 MG/DL (ref 70–99)
HCT VFR BLD AUTO: 25.7 % (ref 40–53)
HGB BLD-MCNC: 8 G/DL (ref 13.3–17.7)
LAB SCANNED RESULT: NORMAL
LAB SCANNED RESULT: NORMAL
LYMPHOCYTES # BLD AUTO: 0.5 10E9/L (ref 0.8–5.3)
LYMPHOCYTES NFR BLD AUTO: 7.2 %
MCH RBC QN AUTO: 29.2 PG (ref 26.5–33)
MCHC RBC AUTO-ENTMCNC: 31.1 G/DL (ref 31.5–36.5)
MCV RBC AUTO: 94 FL (ref 78–100)
METAMYELOCYTES # BLD: 0.1 10E9/L
METAMYELOCYTES NFR BLD MANUAL: 0.9 %
MONOCYTES # BLD AUTO: 0.3 10E9/L (ref 0–1.3)
MONOCYTES NFR BLD AUTO: 4.5 %
MYELOCYTES # BLD: 0.1 10E9/L
MYELOCYTES NFR BLD MANUAL: 1.8 %
NEUTROPHILS # BLD AUTO: 5.4 10E9/L (ref 1.6–8.3)
NEUTROPHILS NFR BLD AUTO: 85.6 %
OVALOCYTES BLD QL SMEAR: SLIGHT
PLATELET # BLD AUTO: 180 10E9/L (ref 150–450)
PLATELET # BLD EST: ABNORMAL 10*3/UL
POIKILOCYTOSIS BLD QL SMEAR: SLIGHT
POTASSIUM SERPL-SCNC: 4.4 MMOL/L (ref 3.4–5.3)
PROT SERPL-MCNC: 4.9 G/DL (ref 6.8–8.8)
RBC # BLD AUTO: 2.74 10E12/L (ref 4.4–5.9)
RBC INCLUSIONS BLD: SLIGHT
SODIUM SERPL-SCNC: 132 MMOL/L (ref 133–144)
TACROLIMUS BLD-MCNC: 9.7 UG/L (ref 5–15)
TME LAST DOSE: NORMAL H
WBC # BLD AUTO: 6.3 10E9/L (ref 4–11)

## 2021-07-01 PROCEDURE — 80197 ASSAY OF TACROLIMUS: CPT | Performed by: INTERNAL MEDICINE

## 2021-07-01 PROCEDURE — 999N000128 HC STATISTIC PERIPHERAL IV START W/O US GUIDANCE

## 2021-07-01 PROCEDURE — 250N000012 HC RX MED GY IP 250 OP 636 PS 637: Performed by: STUDENT IN AN ORGANIZED HEALTH CARE EDUCATION/TRAINING PROGRAM

## 2021-07-01 PROCEDURE — 85025 COMPLETE CBC W/AUTO DIFF WBC: CPT | Performed by: INTERNAL MEDICINE

## 2021-07-01 PROCEDURE — 80048 BASIC METABOLIC PNL TOTAL CA: CPT | Performed by: INTERNAL MEDICINE

## 2021-07-01 PROCEDURE — 99232 SBSQ HOSP IP/OBS MODERATE 35: CPT | Mod: 24 | Performed by: INTERNAL MEDICINE

## 2021-07-01 PROCEDURE — 80076 HEPATIC FUNCTION PANEL: CPT | Performed by: INTERNAL MEDICINE

## 2021-07-01 PROCEDURE — 99233 SBSQ HOSP IP/OBS HIGH 50: CPT | Performed by: NURSE PRACTITIONER

## 2021-07-01 PROCEDURE — 120N000003 HC R&B IMCU UMMC

## 2021-07-01 PROCEDURE — 94640 AIRWAY INHALATION TREATMENT: CPT | Mod: 76

## 2021-07-01 PROCEDURE — 94664 DEMO&/EVAL PT USE INHALER: CPT

## 2021-07-01 PROCEDURE — 97530 THERAPEUTIC ACTIVITIES: CPT | Mod: GP

## 2021-07-01 PROCEDURE — 250N000013 HC RX MED GY IP 250 OP 250 PS 637: Performed by: INTERNAL MEDICINE

## 2021-07-01 PROCEDURE — 36415 COLL VENOUS BLD VENIPUNCTURE: CPT | Performed by: INTERNAL MEDICINE

## 2021-07-01 PROCEDURE — 250N000013 HC RX MED GY IP 250 OP 250 PS 637: Performed by: STUDENT IN AN ORGANIZED HEALTH CARE EDUCATION/TRAINING PROGRAM

## 2021-07-01 PROCEDURE — 999N000127 HC STATISTIC PERIPHERAL IV START W US GUIDANCE

## 2021-07-01 PROCEDURE — 97110 THERAPEUTIC EXERCISES: CPT | Mod: GP

## 2021-07-01 PROCEDURE — 80197 ASSAY OF TACROLIMUS: CPT | Performed by: NURSE PRACTITIONER

## 2021-07-01 PROCEDURE — 250N000011 HC RX IP 250 OP 636: Performed by: STUDENT IN AN ORGANIZED HEALTH CARE EDUCATION/TRAINING PROGRAM

## 2021-07-01 PROCEDURE — 250N000011 HC RX IP 250 OP 636: Performed by: NURSE PRACTITIONER

## 2021-07-01 PROCEDURE — 250N000009 HC RX 250: Performed by: INTERNAL MEDICINE

## 2021-07-01 PROCEDURE — 999N001017 HC STATISTIC GLUCOSE BY METER IP

## 2021-07-01 PROCEDURE — 250N000013 HC RX MED GY IP 250 OP 250 PS 637: Performed by: NURSE PRACTITIONER

## 2021-07-01 PROCEDURE — 250N000009 HC RX 250: Performed by: NURSE PRACTITIONER

## 2021-07-01 PROCEDURE — 258N000003 HC RX IP 258 OP 636: Performed by: NURSE PRACTITIONER

## 2021-07-01 PROCEDURE — 97116 GAIT TRAINING THERAPY: CPT | Mod: GP

## 2021-07-01 PROCEDURE — 250N000012 HC RX MED GY IP 250 OP 636 PS 637: Performed by: NURSE PRACTITIONER

## 2021-07-01 PROCEDURE — 71045 X-RAY EXAM CHEST 1 VIEW: CPT | Mod: 26 | Performed by: RADIOLOGY

## 2021-07-01 PROCEDURE — 71045 X-RAY EXAM CHEST 1 VIEW: CPT

## 2021-07-01 PROCEDURE — 94640 AIRWAY INHALATION TREATMENT: CPT

## 2021-07-01 PROCEDURE — 999N000157 HC STATISTIC RCP TIME EA 10 MIN

## 2021-07-01 RX ORDER — POLYETHYLENE GLYCOL 3350 17 G/17G
17 POWDER, FOR SOLUTION ORAL DAILY
Status: DISCONTINUED | OUTPATIENT
Start: 2021-07-01 | End: 2021-07-05 | Stop reason: HOSPADM

## 2021-07-01 RX ORDER — AMOXICILLIN 250 MG
1 CAPSULE ORAL AT BEDTIME
Status: DISCONTINUED | OUTPATIENT
Start: 2021-07-01 | End: 2021-07-05 | Stop reason: HOSPADM

## 2021-07-01 RX ORDER — ALBUTEROL SULFATE 0.83 MG/ML
2.5 SOLUTION RESPIRATORY (INHALATION)
Status: DISCONTINUED | OUTPATIENT
Start: 2021-07-01 | End: 2021-07-05 | Stop reason: HOSPADM

## 2021-07-01 RX ADMIN — PANTOPRAZOLE SODIUM 40 MG: 40 TABLET, DELAYED RELEASE ORAL at 08:30

## 2021-07-01 RX ADMIN — AZITHROMYCIN MONOHYDRATE 500 MG: 500 INJECTION, POWDER, LYOPHILIZED, FOR SOLUTION INTRAVENOUS at 12:46

## 2021-07-01 RX ADMIN — GABAPENTIN 300 MG: 300 CAPSULE ORAL at 08:29

## 2021-07-01 RX ADMIN — VANCOMYCIN HYDROCHLORIDE 1000 MG: 1 INJECTION, SOLUTION INTRAVENOUS at 23:54

## 2021-07-01 RX ADMIN — UMECLIDINIUM 1 PUFF: 62.5 AEROSOL, POWDER ORAL at 08:29

## 2021-07-01 RX ADMIN — VALGANCICLOVIR 450 MG: 450 TABLET, FILM COATED ORAL at 08:30

## 2021-07-01 RX ADMIN — INSULIN GLARGINE 20 UNITS: 100 INJECTION, SOLUTION SUBCUTANEOUS at 08:42

## 2021-07-01 RX ADMIN — ACETAMINOPHEN 650 MG: 325 TABLET, FILM COATED ORAL at 09:21

## 2021-07-01 RX ADMIN — ALBUTEROL SULFATE 2.5 MG: 2.5 SOLUTION RESPIRATORY (INHALATION) at 22:03

## 2021-07-01 RX ADMIN — Medication 1 TABLET: at 19:56

## 2021-07-01 RX ADMIN — NYSTATIN 1000000 UNITS: 500000 SUSPENSION ORAL at 08:30

## 2021-07-01 RX ADMIN — ROSUVASTATIN CALCIUM 10 MG: 5 TABLET, FILM COATED ORAL at 08:30

## 2021-07-01 RX ADMIN — ALLOPURINOL 300 MG: 300 TABLET ORAL at 08:30

## 2021-07-01 RX ADMIN — DOCUSATE SODIUM 50 MG AND SENNOSIDES 8.6 MG 1 TABLET: 8.6; 5 TABLET, FILM COATED ORAL at 21:56

## 2021-07-01 RX ADMIN — TACROLIMUS 5 MG: 5 CAPSULE ORAL at 18:40

## 2021-07-01 RX ADMIN — BUPROPION HYDROCHLORIDE 75 MG: 75 TABLET, FILM COATED ORAL at 09:22

## 2021-07-01 RX ADMIN — CEFEPIME 2 G: 2 INJECTION, POWDER, FOR SOLUTION INTRAVENOUS at 15:10

## 2021-07-01 RX ADMIN — MYCOPHENOLATE MOFETIL 1000 MG: 250 CAPSULE ORAL at 19:56

## 2021-07-01 RX ADMIN — GABAPENTIN 300 MG: 300 CAPSULE ORAL at 19:56

## 2021-07-01 RX ADMIN — MONTELUKAST 10 MG: 10 TABLET, FILM COATED ORAL at 21:54

## 2021-07-01 RX ADMIN — PANTOPRAZOLE SODIUM 40 MG: 40 TABLET, DELAYED RELEASE ORAL at 19:58

## 2021-07-01 RX ADMIN — VANCOMYCIN HYDROCHLORIDE 1000 MG: 1 INJECTION, SOLUTION INTRAVENOUS at 01:14

## 2021-07-01 RX ADMIN — FLUTICASONE FUROATE AND VILANTEROL TRIFENATATE 1 PUFF: 100; 25 POWDER RESPIRATORY (INHALATION) at 08:28

## 2021-07-01 RX ADMIN — NYSTATIN 1000000 UNITS: 500000 SUSPENSION ORAL at 19:53

## 2021-07-01 RX ADMIN — NYSTATIN 1000000 UNITS: 500000 SUSPENSION ORAL at 15:10

## 2021-07-01 RX ADMIN — GABAPENTIN 300 MG: 300 CAPSULE ORAL at 12:46

## 2021-07-01 RX ADMIN — AMITRIPTYLINE HYDROCHLORIDE 25 MG: 25 TABLET, FILM COATED ORAL at 21:54

## 2021-07-01 RX ADMIN — ALBUTEROL SULFATE 2.5 MG: 2.5 SOLUTION RESPIRATORY (INHALATION) at 08:37

## 2021-07-01 RX ADMIN — BUPROPION HYDROCHLORIDE 75 MG: 75 TABLET, FILM COATED ORAL at 19:56

## 2021-07-01 RX ADMIN — MYCOPHENOLATE MOFETIL 1000 MG: 250 CAPSULE ORAL at 08:30

## 2021-07-01 RX ADMIN — Medication 1 TABLET: at 08:29

## 2021-07-01 RX ADMIN — ASPIRIN 81 MG CHEWABLE TABLET 81 MG: 81 TABLET CHEWABLE at 08:30

## 2021-07-01 RX ADMIN — CEFEPIME 2 G: 2 INJECTION, POWDER, FOR SOLUTION INTRAVENOUS at 02:41

## 2021-07-01 RX ADMIN — PREDNISONE 20 MG: 20 TABLET ORAL at 08:30

## 2021-07-01 RX ADMIN — TACROLIMUS 5 MG: 5 CAPSULE ORAL at 08:29

## 2021-07-01 RX ADMIN — SULFAMETHOXAZOLE AND TRIMETHOPRIM 1 TABLET: 400; 80 TABLET ORAL at 08:30

## 2021-07-01 RX ADMIN — NYSTATIN 1000000 UNITS: 500000 SUSPENSION ORAL at 12:46

## 2021-07-01 RX ADMIN — ALBUTEROL SULFATE 2.5 MG: 2.5 SOLUTION RESPIRATORY (INHALATION) at 15:48

## 2021-07-01 ASSESSMENT — MIFFLIN-ST. JEOR: SCORE: 1668.07

## 2021-07-01 NOTE — PROGRESS NOTES
"Endocrine Progress Note  Patient: Jim Willingham   MRN: [id number]  Date of Service: 07/01/2021    Subjective:  Patient is feeling well this morning. Has more of an appetite and was able to eat a bowl of Raisin Bran for breakfast. Has no concerns for us this morning.   Glucose luis to 243 yesterday afternoon - carb coverage was started after patient had consumed a nutrition drink. Stayed elevated in 200s throughout the evening, decreasing to 184 by 4am and 169 this morning before breakfast.    Physical Examination:  BP (!) 112/92   Pulse 96   Temp 97.9  F (36.6  C) (Oral)   Resp 21   Ht 1.727 m (5' 8\")   Wt 90.4 kg (199 lb 3.2 oz)   SpO2 99%   BMI 30.29 kg/m    General: Laying in bed comfortably, in no acute distress  Respiratory: Breathing comfortably on room air, answering questions in complete sentences without appearing short of breath  Neuro: Alert and oriented, answering questions appropriately and clearly  Psych: Appropriate mood, affect    Medications:  Reviewed    Endocrine Labs:  243 -> 259 around 8 pm 6/30, given 4 units insulin (1 for meal carb coverage, 3 sliding scale) -> 222 -> 1 unit sliding scale before bed -> 184 at 4am -> 169 before breakfast today (7/1)    Assessment and plan:    Type II Diabetes  Hx of postprandial hypoglycemia  Blood sugars were elevated into 200s yesterday (06/30) afternoon and evening, likely due to carb intake that was not covered by insulin earlier in the day. Blood glucose remained high (259 to 222) with current carb correction - will plan to increase meal time insulin today.  - Increase meal time novolog to 1 unit for every 12 g of carbs (from 1 unit to 15 carbs) - ordered  - Continue glargine 20 units qAM   - If blood glucose remains elevated throughout the afternoon today >160 despite increased carb coverage, will consider increasing glargine dose for tomorrow (7/2) AM   - Continue moderate dose sliding scale insulin (1 unit for every 50 over 140)  - glucose " checks before meals, at bedtime, at 0200  - hypoglycemia protocol  - If dose of prednisone is changed during hospitalization or at time of discharge, please let endocrine team know as this will alter plan for insulin  - Diabetes educator consult placed to ensure patient has a plan for monitoring glucose and administering insulin at time of discharge (had not been taking insulin prior to admission)    Patient seen and examined with attending, Dr. Felicita Darby MD  Internal Medicine PGY-1    Attending tie-in note  I saw the patient with resident Dr. Darby and directly examined patient and discussed. Agree above note and plan.       Jacque Mims MD  Staff Physician  Endocrinology and Metabolism  [facility]  License: [place] [id number]  Pager: [phone]

## 2021-07-01 NOTE — PROGRESS NOTES
[facility]   Cardiology II Service / Advanced Heart Failure  Daily Progress Note      Patient: Jim Willingham  MRN: [id number]  Admission Date: 6/28/2021  Hospital Day # 3    Assessment and Plan: Jim Willingham is a 66 year old male with history of NICM s/p OHT (5/6/21) postoperative course was complicated by right pleural air leak with prolonged chest tube, COPD, CKDIII, DMII, anemia who presents with SOB, cough, fevers/chills, fatigue found to have sepsis felt secondary to CAP.    Today's Plan:  -finish azithromycin today  -stop vancomycin 48hr from negative cx  -tacro level in AM  -monitor cultures    # Sepsis secondary to bacterial CAP/RLL infiltrate  # Chronic immunosupression  # Moderate pleural effusion  # Prior right pleural air leak s/p prolonged chest tube  # 1 of 2 blood cultures with staph epi 2/2 contaminant  P/w two days of non-productive cough, SOB, fevers/chills, fatigue with + sick contacts at home (wife with URI). Febrile (102.7), tachycardic (120s-150s), new/worse RLL consolidation with moderate effusion. Pan CT unrevealing for alternative source. Low suspicion for COPD exacerbation. COVID negative. Fungal unlikely given acuity. RVP negative.   - transplant ID consulted   - sp three days azitrhomycin   - continue cefepime, plan for 7d course (switch to levaquin when ready to discharge)   - continue vanco for 48 hr after negative blood cx  - pulmonary consulted   - diagnostic and therapeutic thoracentesis 6/29, 270mL removed   - cx ngtd  - blood cultures 1 of 2 bottles 6/28 with staph epi, suspect contaminant  - repeat blood cultures 6/30 pending, ngtd  - pleural fluid cx pending, ngtd  - strep Ag negative  - histo and blasto pending  - aspergillus negative  - fungitell negative and fungal blood cx pending  - MRSA nares negative  - EBV/CMV negative  - repeat chest CT in 2-3 weeks per ID to assure no e/o fungal PNA (~7/14-7/21)    # Status post OHT on 5/6/21, history of NICM and HMII  LVAD  # Chronic immunosuppression  # Sinus tachycardia, exacerbated in the setting of sepsis   * TTE 6/7/21: EF 65-60%, RV normal, IVC normal  * RHC 6/21/21: RA 9 PA 32/20(25) PCWP 13 Kee CO/CI 6.1/3.1  * Angiogram deferred due to renal dysfunction    Graft Function:   --Volume: euvolemic  --BP: wnl  --HR: improved to baseline 90s-100s with IVF    Immunosuppression:   --prednisone 20 mg daily per outpt, taper per protocol   --Cellcept 1000 mg BID (decreased 6/21 due to abnormal CXR per Dr Montgomery)  --tacrolimus 5 mg bid, trough 9.7 today (13hr) (goal 10-12)    Serostatus: CMV: D+/R+, EBV: D+/R+, Toxo: D-/R-    Prophylaxis:   --CAV: aspirin 81 mg, rosuvastatin 10 mg daily  --Thrush: Nystatin   --PCP: Bactrim single strength EOD (renally dosed)  --GI: Protonix   --Osteoporosis: calcium/vitamin D   --CMV: Valcyte 450 mg EOD x 3 mos renally dosed    # Hypovolemic hyponatremia, improved  Na 125 on admission from wnl last check, improved to 129 with IVF. Also hyperglycemia BS 300s so corrected, Na 133. Na 132 today.   - daily BMP    # CKD stage III  Cr 1.85 on admission from recent baseline ~2.   - daily BMP     # DMII  Recent BS 300s on BMPs, 487 on admission, now improved < 200.   - endocrine diabetes team managing    # Anemia   # Thrombocytopenia, resolved  Hgb stable, plts WNL, no signs or symptoms of active bleeding. Multifactorial.      # Depression  - PTA bupropion, resume amitriptyline     # Non severe malnutrition  - nutrition consulted        FEN: Regular diet  PROPHY:  Deferred  LINES:  PIV  DISPO:  Pending, possible discharge tomorrow  CODE STATUS:  Full code    Kiesha Wilder DNP, NP-C  Advanced Heart Failure/Cardiology II Service  Pager [phone] [facility] [id number]    ================================================================    Subjective/24-Hr Events:   Last 24 hr care team notes reviewed. Feeling better again today. Breathing improved, has non productive cough. Feels tired and difficulty with word  "finding but getting better. Denies neuro symptoms.     ROS:  4 point ROS including respiratory, CV, GI and  (other than that noted in the HPI) is negative.     Medications: Reviewed in EPIC.     Physical Exam:   BP 99/61   Pulse 93   Temp 97.8  F (36.6  C) (Oral)   Resp 16   Ht 1.727 m (5' 8\")   Wt 90.4 kg (199 lb 3.2 oz)   SpO2 97%   BMI 30.29 kg/m      GENERAL: Appears mild shortness of breath at rest, improved. Seated at bedside.   HEENT: Eye symmetrical, no discharge or icterus bilaterally. Mucous membranes moist and without lesions.  NECK: Supple, JVD just above clavicle at 90 degrees.   CV: Tachycardic and regular, +S1S2, no murmur, rub, or gallop.   RESPIRATORY: Respirations regular, even, and unlabored. Lungs dim at bases, no overt crackles or wheezes.   GI: Soft, obese and non distended with normoactive bowel sounds present in all quadrants. No tenderness, rebound, guarding.   EXTREMITIES: No peripheral edema. 2+ bilateral pedal pulses.   NEUROLOGIC: Alert and oriented x 3. No focal deficits.   MUSCULOSKELETAL: No joint swelling or tenderness.   SKIN: No jaundice. No rashes or lesions. Warm to touch.     Labs:  CMP  Recent Labs   Lab 07/01/21  0644 06/30/21  0339 06/29/21  0631 06/28/21  2208   * 130* 129* 125*   POTASSIUM 4.4 4.8 5.3 5.3   CHLORIDE 103 101 100 94   CO2 23 25 23 25   ANIONGAP 6 4 6 6   * 157* 354* 487*   BUN 42* 47* 41* 39*   CR 2.09* 2.24* 1.81* 1.85*   GFRESTIMATED 32* 30* 39* 38*   GFRESTBLACK 38* 35* 45* 44*   QAMAR 8.0* 8.3* 8.3* 8.6   MAG  --  2.2 1.5*  --    PHOS  --  2.8  --   --    PROTTOTAL 4.9* 5.1* 5.7* 6.5*   ALBUMIN 2.0* 2.2* 2.5* 3.2*   BILITOTAL 0.3 0.5 0.6 0.5   ALKPHOS 178* 165* 217* 268*   AST 53* 10 19 38   ALT 35 30 47 70       CBC  Recent Labs   Lab 07/01/21  0644 06/30/21  0339 06/29/21 2024 06/29/21  0912   WBC 6.3 7.5 7.8 8.0   RBC 2.74* 3.04* 3.00* 3.28*   HGB 8.0* 8.9* 8.8* 9.4*   HCT 25.7* 28.1* 28.3* 30.6*   MCV 94 92 94 93   MCH 29.2 29.3 " 29.3 28.7   [facility] 31.1* 31.7 31.1* 30.7*   RDW 16.0* 15.9* 15.9* 15.6*    184 164 240     Time/Communication  I personally spent a total of 35 minutes. Of that 25 minutes was counseling/coordination of patient's care. Plan of care discussed with patient. See my note above for details.    Patient discussed with Dr. Conte.

## 2021-07-01 NOTE — PLAN OF CARE
D AVSS with sat's 96% on room air. Heart regular/tachy and lung decreased in bases R>L. Voiced no c/o pain or nausea overnight and slept well between cares. PIV was pulled out accidentally by patient and new one placed by vascular access. No BM and voided adequate amounts into urinal at bedside.   I Vital's, assessment and med's per order.   A Resting in bed with call light in reach.   P Continue to monitor and update MD with changes.

## 2021-07-02 ENCOUNTER — APPOINTMENT (OUTPATIENT)
Dept: OCCUPATIONAL THERAPY | Facility: CLINIC | Age: 66
DRG: 871 | End: 2021-07-02
Payer: COMMERCIAL

## 2021-07-02 DIAGNOSIS — Z94.1 TRANSPLANTED HEART (H): Primary | ICD-10-CM

## 2021-07-02 LAB
ACID FAST STN SPEC QL: NORMAL
ACID FAST STN SPEC QL: NORMAL
ALBUMIN SERPL-MCNC: 2.2 G/DL (ref 3.4–5)
ALP SERPL-CCNC: 223 U/L (ref 40–150)
ALT SERPL W P-5'-P-CCNC: 50 U/L (ref 0–70)
ANION GAP SERPL CALCULATED.3IONS-SCNC: 6 MMOL/L (ref 3–14)
AST SERPL W P-5'-P-CCNC: 47 U/L (ref 0–45)
BACTERIA SPEC CULT: ABNORMAL
BASOPHILS # BLD AUTO: 0 10E9/L (ref 0–0.2)
BASOPHILS NFR BLD AUTO: 0.4 %
BILIRUB DIRECT SERPL-MCNC: 0.1 MG/DL (ref 0–0.2)
BILIRUB SERPL-MCNC: 0.3 MG/DL (ref 0.2–1.3)
BUN SERPL-MCNC: 40 MG/DL (ref 7–30)
CALCIUM SERPL-MCNC: 8.3 MG/DL (ref 8.5–10.1)
CHLORIDE SERPL-SCNC: 105 MMOL/L (ref 94–109)
CO2 SERPL-SCNC: 22 MMOL/L (ref 20–32)
CREAT SERPL-MCNC: 2.01 MG/DL (ref 0.66–1.25)
DIFFERENTIAL METHOD BLD: ABNORMAL
EOSINOPHIL # BLD AUTO: 0 10E9/L (ref 0–0.7)
EOSINOPHIL NFR BLD AUTO: 0.6 %
ERYTHROCYTE [DISTWIDTH] IN BLOOD BY AUTOMATED COUNT: 15.8 % (ref 10–15)
GFR SERPL CREATININE-BSD FRML MDRD: 34 ML/MIN/{1.73_M2}
GLUCOSE BLDC GLUCOMTR-MCNC: 153 MG/DL (ref 70–99)
GLUCOSE BLDC GLUCOMTR-MCNC: 182 MG/DL (ref 70–99)
GLUCOSE BLDC GLUCOMTR-MCNC: 228 MG/DL (ref 70–99)
GLUCOSE BLDC GLUCOMTR-MCNC: 247 MG/DL (ref 70–99)
GLUCOSE BLDC GLUCOMTR-MCNC: 285 MG/DL (ref 70–99)
GLUCOSE BLDC GLUCOMTR-MCNC: 314 MG/DL (ref 70–99)
GLUCOSE SERPL-MCNC: 170 MG/DL (ref 70–99)
HCT VFR BLD AUTO: 25.4 % (ref 40–53)
HGB BLD-MCNC: 7.9 G/DL (ref 13.3–17.7)
IMM GRANULOCYTES # BLD: 0.2 10E9/L (ref 0–0.4)
IMM GRANULOCYTES NFR BLD: 4.2 %
LAB SCANNED RESULT: NORMAL
LAB SCANNED RESULT: NORMAL
LYMPHOCYTES # BLD AUTO: 0.6 10E9/L (ref 0.8–5.3)
LYMPHOCYTES NFR BLD AUTO: 12.5 %
MAGNESIUM SERPL-MCNC: 2.1 MG/DL (ref 1.6–2.3)
MCH RBC QN AUTO: 29.3 PG (ref 26.5–33)
MCHC RBC AUTO-ENTMCNC: 31.1 G/DL (ref 31.5–36.5)
MCV RBC AUTO: 94 FL (ref 78–100)
MONOCYTES # BLD AUTO: 0.4 10E9/L (ref 0–1.3)
MONOCYTES NFR BLD AUTO: 8.4 %
NEUTROPHILS # BLD AUTO: 3.5 10E9/L (ref 1.6–8.3)
NEUTROPHILS NFR BLD AUTO: 73.9 %
NRBC # BLD AUTO: 0 10*3/UL
NRBC BLD AUTO-RTO: 0 /100
PLATELET # BLD AUTO: 179 10E9/L (ref 150–450)
POTASSIUM SERPL-SCNC: 4.3 MMOL/L (ref 3.4–5.3)
PROT SERPL-MCNC: 5.3 G/DL (ref 6.8–8.8)
RBC # BLD AUTO: 2.7 10E12/L (ref 4.4–5.9)
SODIUM SERPL-SCNC: 133 MMOL/L (ref 133–144)
SPECIMEN SOURCE: ABNORMAL
SPECIMEN SOURCE: NORMAL
TACROLIMUS BLD-MCNC: 9.8 UG/L (ref 5–15)
TME LAST DOSE: NORMAL H
WBC # BLD AUTO: 4.8 10E9/L (ref 4–11)

## 2021-07-02 PROCEDURE — 250N000013 HC RX MED GY IP 250 OP 250 PS 637: Performed by: NURSE PRACTITIONER

## 2021-07-02 PROCEDURE — 250N000013 HC RX MED GY IP 250 OP 250 PS 637: Performed by: STUDENT IN AN ORGANIZED HEALTH CARE EDUCATION/TRAINING PROGRAM

## 2021-07-02 PROCEDURE — 85025 COMPLETE CBC W/AUTO DIFF WBC: CPT | Performed by: INTERNAL MEDICINE

## 2021-07-02 PROCEDURE — 36415 COLL VENOUS BLD VENIPUNCTURE: CPT | Performed by: INTERNAL MEDICINE

## 2021-07-02 PROCEDURE — 80076 HEPATIC FUNCTION PANEL: CPT | Performed by: INTERNAL MEDICINE

## 2021-07-02 PROCEDURE — 250N000012 HC RX MED GY IP 250 OP 636 PS 637: Performed by: NURSE PRACTITIONER

## 2021-07-02 PROCEDURE — 250N000011 HC RX IP 250 OP 636: Performed by: NURSE PRACTITIONER

## 2021-07-02 PROCEDURE — 250N000009 HC RX 250: Performed by: INTERNAL MEDICINE

## 2021-07-02 PROCEDURE — 250N000011 HC RX IP 250 OP 636: Performed by: STUDENT IN AN ORGANIZED HEALTH CARE EDUCATION/TRAINING PROGRAM

## 2021-07-02 PROCEDURE — 80048 BASIC METABOLIC PNL TOTAL CA: CPT | Performed by: INTERNAL MEDICINE

## 2021-07-02 PROCEDURE — 97530 THERAPEUTIC ACTIVITIES: CPT | Mod: GO | Performed by: OCCUPATIONAL THERAPIST

## 2021-07-02 PROCEDURE — 999N001017 HC STATISTIC GLUCOSE BY METER IP

## 2021-07-02 PROCEDURE — 120N000003 HC R&B IMCU UMMC

## 2021-07-02 PROCEDURE — 250N000012 HC RX MED GY IP 250 OP 636 PS 637: Performed by: STUDENT IN AN ORGANIZED HEALTH CARE EDUCATION/TRAINING PROGRAM

## 2021-07-02 PROCEDURE — 94640 AIRWAY INHALATION TREATMENT: CPT | Mod: 76

## 2021-07-02 PROCEDURE — 80197 ASSAY OF TACROLIMUS: CPT | Performed by: INTERNAL MEDICINE

## 2021-07-02 PROCEDURE — 97535 SELF CARE MNGMENT TRAINING: CPT | Mod: GO | Performed by: OCCUPATIONAL THERAPIST

## 2021-07-02 PROCEDURE — 97165 OT EVAL LOW COMPLEX 30 MIN: CPT | Mod: GO | Performed by: OCCUPATIONAL THERAPIST

## 2021-07-02 PROCEDURE — 999N000157 HC STATISTIC RCP TIME EA 10 MIN

## 2021-07-02 PROCEDURE — 99232 SBSQ HOSP IP/OBS MODERATE 35: CPT | Mod: 24 | Performed by: INTERNAL MEDICINE

## 2021-07-02 PROCEDURE — 83735 ASSAY OF MAGNESIUM: CPT | Performed by: INTERNAL MEDICINE

## 2021-07-02 PROCEDURE — 99233 SBSQ HOSP IP/OBS HIGH 50: CPT | Performed by: NURSE PRACTITIONER

## 2021-07-02 RX ORDER — LIDOCAINE 40 MG/G
CREAM TOPICAL
Status: CANCELLED | OUTPATIENT
Start: 2021-07-02

## 2021-07-02 RX ORDER — ACETAMINOPHEN 325 MG/1
975 TABLET ORAL EVERY 6 HOURS PRN
Status: DISCONTINUED | OUTPATIENT
Start: 2021-07-02 | End: 2021-07-05 | Stop reason: HOSPADM

## 2021-07-02 RX ORDER — FUROSEMIDE 10 MG/ML
40 INJECTION INTRAMUSCULAR; INTRAVENOUS ONCE
Status: COMPLETED | OUTPATIENT
Start: 2021-07-02 | End: 2021-07-02

## 2021-07-02 RX ADMIN — ALLOPURINOL 300 MG: 300 TABLET ORAL at 08:16

## 2021-07-02 RX ADMIN — TACROLIMUS 5 MG: 5 CAPSULE ORAL at 18:20

## 2021-07-02 RX ADMIN — CEFEPIME 2 G: 2 INJECTION, POWDER, FOR SOLUTION INTRAVENOUS at 02:13

## 2021-07-02 RX ADMIN — BUPROPION HYDROCHLORIDE 75 MG: 75 TABLET, FILM COATED ORAL at 08:15

## 2021-07-02 RX ADMIN — TACROLIMUS 5 MG: 5 CAPSULE ORAL at 08:15

## 2021-07-02 RX ADMIN — PREDNISONE 20 MG: 20 TABLET ORAL at 08:16

## 2021-07-02 RX ADMIN — POLYETHYLENE GLYCOL 3350 17 G: 17 POWDER, FOR SOLUTION ORAL at 08:26

## 2021-07-02 RX ADMIN — NYSTATIN 1000000 UNITS: 500000 SUSPENSION ORAL at 11:26

## 2021-07-02 RX ADMIN — BUPROPION HYDROCHLORIDE 75 MG: 75 TABLET, FILM COATED ORAL at 20:35

## 2021-07-02 RX ADMIN — Medication 10 MG: at 21:47

## 2021-07-02 RX ADMIN — UMECLIDINIUM 1 PUFF: 62.5 AEROSOL, POWDER ORAL at 08:10

## 2021-07-02 RX ADMIN — MYCOPHENOLATE MOFETIL 1000 MG: 250 CAPSULE ORAL at 20:34

## 2021-07-02 RX ADMIN — FLUTICASONE FUROATE AND VILANTEROL TRIFENATATE 1 PUFF: 100; 25 POWDER RESPIRATORY (INHALATION) at 08:10

## 2021-07-02 RX ADMIN — PANTOPRAZOLE SODIUM 40 MG: 40 TABLET, DELAYED RELEASE ORAL at 20:34

## 2021-07-02 RX ADMIN — AMITRIPTYLINE HYDROCHLORIDE 25 MG: 25 TABLET, FILM COATED ORAL at 21:47

## 2021-07-02 RX ADMIN — MYCOPHENOLATE MOFETIL 1000 MG: 250 CAPSULE ORAL at 08:15

## 2021-07-02 RX ADMIN — ACETAMINOPHEN 975 MG: 325 TABLET, FILM COATED ORAL at 11:25

## 2021-07-02 RX ADMIN — SULFAMETHOXAZOLE AND TRIMETHOPRIM 1 TABLET: 400; 80 TABLET ORAL at 08:16

## 2021-07-02 RX ADMIN — CEFEPIME 2 G: 2 INJECTION, POWDER, FOR SOLUTION INTRAVENOUS at 14:05

## 2021-07-02 RX ADMIN — NYSTATIN 1000000 UNITS: 500000 SUSPENSION ORAL at 08:17

## 2021-07-02 RX ADMIN — Medication 1 TABLET: at 08:15

## 2021-07-02 RX ADMIN — ASPIRIN 81 MG CHEWABLE TABLET 81 MG: 81 TABLET CHEWABLE at 08:15

## 2021-07-02 RX ADMIN — GABAPENTIN 300 MG: 300 CAPSULE ORAL at 11:26

## 2021-07-02 RX ADMIN — Medication 1 TABLET: at 20:34

## 2021-07-02 RX ADMIN — Medication 25 MG: at 15:56

## 2021-07-02 RX ADMIN — NYSTATIN 1000000 UNITS: 500000 SUSPENSION ORAL at 20:35

## 2021-07-02 RX ADMIN — GABAPENTIN 300 MG: 300 CAPSULE ORAL at 20:35

## 2021-07-02 RX ADMIN — GABAPENTIN 300 MG: 300 CAPSULE ORAL at 08:16

## 2021-07-02 RX ADMIN — MONTELUKAST 10 MG: 10 TABLET, FILM COATED ORAL at 21:47

## 2021-07-02 RX ADMIN — NYSTATIN 1000000 UNITS: 500000 SUSPENSION ORAL at 15:46

## 2021-07-02 RX ADMIN — FUROSEMIDE 40 MG: 10 INJECTION, SOLUTION INTRAVENOUS at 08:11

## 2021-07-02 RX ADMIN — ROSUVASTATIN CALCIUM 10 MG: 5 TABLET, FILM COATED ORAL at 08:16

## 2021-07-02 RX ADMIN — ALBUTEROL SULFATE 2.5 MG: 2.5 SOLUTION RESPIRATORY (INHALATION) at 21:37

## 2021-07-02 RX ADMIN — PANTOPRAZOLE SODIUM 40 MG: 40 TABLET, DELAYED RELEASE ORAL at 08:16

## 2021-07-02 ASSESSMENT — MIFFLIN-ST. JEOR: SCORE: 1662.62

## 2021-07-02 NOTE — PROGRESS NOTES
Pulmonary follow-up    No recurrent fevers or signs of ongoing infection/inflammation.     Pulmonary will sign-off. Please call with any questions.     Dann Bryant MD  Pulmonary fellow

## 2021-07-02 NOTE — PROGRESS NOTES
"Endocrine Progress Note  Patient: Jim Willingham   MRN: [id number]  Date of Service: 07/02/2021    Subjective:  Per chart review and patient, CV team was originally considering discharge today but decided to keep him inpatient to finish antibiotic course.   Patient not feeling as well today - ongoing lightheadedness and headache. Headache slightly improved at time of visit about an hour after Tylenol.     Physical Examination:  BP (!) 129/94 (BP Location: Right arm)   Pulse 99   Temp 97.8  F (36.6  C) (Oral)   Resp 18   Ht 1.727 m (5' 8\")   Wt 89.8 kg (198 lb)   SpO2 99%   BMI 30.11 kg/m    General: Laying in bed comfortably, in no acute distress  Respiratory: Breathing comfortably on room air, answering questions in complete sentences without appearing short of breath  Neuro: Alert and oriented, answering questions appropriately and clearly  Psych: Appropriate mood, affect    Medications:  Reviewed    Endocrine Labs:  Glucose:   368 pre-dinner, got 5 units for carb coverage but no sliding scale -> 389 on bedtime check, given 4 units sliding scale -> 314 at 2am check, given 5 units -> 247 at 4am -> 170 -> 153 before breakfast, given 1 unit, got 24 units of glargine daily dose -> 182 pre-lunch 1 hour after glargine, given 5 units carb coverage     Assessment and plan:    Type II Diabetes  Hx of postprandial hypoglycemia  Blood sugars elevated yesterday evening - 368 before dinner. Received 5 units carb coverage but per chart review, no sliding scale insulin. Sugar stayed high at bedtime check and 2am check with current sliding scale insulin. Plan to increase insulin regimen as below today and continue to monitor.   - Increase meal time novolog to 1 unit for every 10 g of carbs (from 1 unit to 12 carbs) - ordered  - Increase glargine to 24 units qAM (from 20 units)    - If blood sugars maintained >160 by tomorrow AM, will plan to increase glargine to 26 or 28 units daily   - Increase sliding scale to high dose " - 1 unit for every 25 over 140 during the day, over 200 at night (from medium dose)  - glucose checks before meals, at bedtime, at 0200  - hypoglycemia protocol  - Endocrine to establish insulin discharge regimen based on inpatient sugars and prednisone outpatient taper plan  - Diabetes educator consult placed to ensure patient has a plan for monitoring glucose and administering insulin at time of discharge (had not been taking insulin prior to admission)    Patient seen and discussed with attending, Dr. Felicita Darby MD  Internal Medicine PGY-1    Attending tie-in note  I saw the patient with resident Dr. Darby and directly examined patient and discussed. Agree above note and plan.       Jacque Mims MD  Staff Physician  Endocrinology and Metabolism  [facility]  License: [place] [id number]  Pager: [phone]

## 2021-07-02 NOTE — PROGRESS NOTES
07/02/21 1525   Quick Adds   Type of Visit Initial Occupational Therapy Evaluation   Living Environment   People in home grandchild(lidia);spouse   Current Living Arrangements house   Home Accessibility stairs to enter home;stairs within home   Number of Stairs, Main Entrance 1   Stair Railings, Main Entrance none   Number of Stairs, Within Home, Primary 8   Stair Railings, Within Home, Primary railing on right side (ascending)   Transportation Anticipated family or friend will provide   Living Environment Comments Pt lives in split level home w/wife, sister, and 7 yr old daughter (adopted granddaughter) Pt is retired. Wife works as an RT at a children's hospital. Pt also worked as an RT for 26 years. Spends most of the time on main level. Tub shower, showers infrequently   Self-Care   Usual Activity Tolerance moderate   Current Activity Tolerance fair   Regular Exercise   (Receiving home PT/OT post transplant)   Equipment Currently Used at Home walker, rolling;shower chair   Activity/Exercise/Self-Care Comment reports just receieved 4ww for home, using shower chair- reports having goal of taking standing shower IND   Disability/Function   Hearing Difficulty or Deaf no   Wear Glasses or Blind no   Concentrating, Remembering or Making Decisions Difficulty no   Difficulty Communicating no   Difficulty Eating/Swallowing no   Walking or Climbing Stairs Difficulty yes   Walking or Climbing Stairs ambulation difficulty, requires equipment   Dressing/Bathing Difficulty yes   Dressing/Bathing bathing difficulty, requires equipment   Toileting issues no   Doing Errands Independently Difficulty (such as shopping) yes   Errands Management has assist   Fall history within last six months no   Change in Functional Status Since Onset of Current Illness/Injury yes   General Information   Onset of Illness/Injury or Date of Surgery 06/28/21   Referring Physician Alexis Lawrence MD   Patient/Family Therapy Goal Statement (OT)  increase strength, discharge home   Additional Occupational Profile Info/Pertinent History of Current Problem  Jim Willingham is a 66 year old male with history of NICM s/p OHT (5/6/21) postoperative course was complicated by right pleural air leak with prolonged chest tube, COPD, CKDIII, DMII, anemia who presents with SOB, cough, fevers/chills, fatigue found to have sepsis felt secondary to CAP.   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   Existing Precautions/Restrictions fall  (MD reports sternal prec lifted)   General Observations and Info Activity: ambulate   Cognitive Status Examination   Orientation Status orientation to person, place and time   Affect/Mental Status (Cognitive) WNL   Follows Commands WNL   Cognitive Status Comments reports confused on admission due to infection, feeling clear now   Visual Perception   Visual Impairment/Limitations WFL   Pain Assessment   Patient Currently in Pain No   Integumentary/Edema   Integumentary/Edema no deficits were identifed   Posture   Posture not impaired   Range of Motion Comprehensive   General Range of Motion no range of motion deficits identified   Strength Comprehensive (MMT)   Comment, General Manual Muscle Testing (MMT) Assessment NT; grossly deconditioned   Muscle Tone Assessment   Muscle Tone Quick Adds No deficits were identified   Coordination   Upper Extremity Coordination No deficits were identified   Bed Mobility   Bed Mobility supine-sit   Supine-Sit [place] (Bed Mobility) supervision   Transfers   Transfers sit-stand transfer;toilet transfer   Transfer Skill: Bed to Chair/Chair to Bed   Bed-Chair [place] (Transfers) supervision   Assistive Device (Bed-Chair Transfers) rolling walker   Sit-Stand Transfer   Sit-Stand [place] (Transfers) supervision   Toilet Transfer   Type (Toilet Transfer) sit-stand   [place] Level (Toilet Transfer) supervision   Activities of Daily Living   BADL  Assessment/Intervention toileting   Toileting   [place] Level (Toileting) set up   Assistive Devices (Toileting) commode chair   Instrumental Activities of Daily Living (IADL)   Previous Responsibilities meal prep;housekeeping;medication management;finances;driving;   IADL Comments Family assisting since discharge home   Clinical Impression   Criteria for Skilled Therapeutic Interventions Met (OT) yes   OT Diagnosis Decreased ADL indep   OT Problem List-Impairments impacting ADL problems related to;activity tolerance impaired;mobility;strength;balance   Assessment of Occupational Performance 3-5 Performance Deficits   Identified Performance Deficits dressing, bathing, home mgmt, household mobility   Planned Therapy Interventions (OT) ADL retraining;IADL retraining;cognition;strengthening;transfer training;home program guidelines;progressive activity/exercise;risk factor education   Clinical Decision Making Complexity (OT) low complexity   Therapy Frequency (OT) 6x/week   Predicted Duration of Therapy 2 weeks   Anticipated Equipment Needs Upon Discharge (OT)   (TBD)   Risk & Benefits of therapy have been explained care plan/treatment goals reviewed;evaluation/treatment results reviewed;patient   Comment-Clinical Impression Pt presenting below baseline with limitations in strength and endurance impacting ADL performance. Will benefit from skilled OT to help pt reach goals of being at safe level to discharge home   OT Discharge Planning    OT Discharge Recommendation (DC Rec) home with home care occupational therapy;Home with assist   OT Rationale for DC Rec Anticipate pt will progress to home with continued HHPT/OT with medical management of infection. Will update recs as appropriate   OT Brief overview of current status  SBA in room with fww   Total Evaluation Time (Minutes)   Total Evaluation Time (Minutes) 5

## 2021-07-02 NOTE — PLAN OF CARE
OT/6D: Cancel- Pt declining OT eval at time of attempt, reports feeling dizzy and 'woozy' not wanting to get out of bed. Will re-attempt later this date vs tomorrow as schedule allows.

## 2021-07-02 NOTE — PROGRESS NOTES
[facility]   Cardiology II Service / Advanced Heart Failure  Daily Progress Note      Patient: Jim Willingham  MRN: [id number]  Admission Date: 6/28/2021  Hospital Day # 4    Assessment and Plan: Jim Willingham is a 66 year old male with history of NICM s/p OHT (5/6/21) postoperative course was complicated by right pleural air leak with prolonged chest tube, COPD, CKDIII, DMII, anemia who presents with SOB, cough, fevers/chills, fatigue found to have sepsis felt secondary to CAP.    Today's Plan:  -monitor cultures  -one time dose IV lasix and assess response  -continue to work with therapies, discharge when safe for home    # Sepsis secondary to bacterial CAP/RLL infiltrate  # Chronic immunosupression  # Moderate pleural effusion  # Prior right pleural air leak s/p prolonged chest tube  # 1 of 2 blood cultures with staph epi 2/2 contaminant  P/w two days of non-productive cough, SOB, fevers/chills, fatigue with + sick contacts at home (wife with URI). Febrile (102.7), tachycardic (120s-150s), new/worse RLL consolidation with moderate effusion. Pan CT unrevealing for alternative source. Low suspicion for COPD exacerbation. COVID negative. Fungal unlikely given acuity. RVP negative.   - transplant ID consulted   - continue cefepime, plan for 7d course through 7/4 (switch to levaquin if ready to discharge)   - sp three days azithromycin 6/29-7/1   - sp vancomycin 6/29-7/1 (48 hr after negative blood cx)  - pulmonary consulted   - diagnostic and therapeutic thoracentesis 6/29, 270mL removed   - pleural cx ngtd  - blood cultures 1 of 2 bottles 6/28 with staph epi, suspect contaminant  - repeat blood cultures 6/30 pending, ngtd  - pleural fluid cx pending, ngtd  - strep Ag negative  - histo and blasto negative  - aspergillus negative  - fungitell negative and fungal blood cx pending  - MRSA nares negative  - EBV/CMV negative  - repeat chest CT in 2-3 weeks per ID to assure no e/o fungal PNA  (~7/14-7/21)    # Status post OHT on 5/6/21, history of NICM and HMII LVAD  # Chronic immunosuppression  # Sinus tachycardia, exacerbated in the setting of sepsis   * TTE 6/7/21: EF 65-60%, RV normal, IVC normal  * RHC 6/21/21: RA 9 PA 32/20(25) PCWP 13 Kee CO/CI 6.1/3.1  * Angiogram deferred due to renal dysfunction    Graft Function:   --Volume: slightly hypervolemic, IV lasix 40 mg once  --BP: wnl  --HR: improved to baseline 90s-100s with IVF    Immunosuppression:   --prednisone 20 mg daily per outpt, taper per protocol   --Cellcept 1000 mg BID (decreased 6/21 due to abnormal CXR per Dr Montgomery)  --tacrolimus 5 mg bid, trough 9.8 today (12.5hr) (goal 10-12)    Serostatus: CMV: D+/R+, EBV: D+/R+, Toxo: D-/R-    Prophylaxis:   --CAV: aspirin 81 mg, rosuvastatin 10 mg daily  --Thrush: Nystatin   --PCP: Bactrim single strength EOD (renally dosed)  --GI: Protonix   --Osteoporosis: calcium/vitamin D   --CMV: Valcyte 450 mg EOD x 3 mos renally dosed    # Hypovolemic hyponatremia, improved  Na 125 on admission from wnl last check, improved to 129 with IVF. Also hyperglycemia BS 300s so corrected, Na 133. Na 133 today.   - daily BMP    # CKD stage III  Cr 1.85 on admission from recent baseline ~2.   - daily BMP     # DMII  Recent BS 300s on BMPs, 487 on admission, now improved < 200.   - endocrine diabetes team managing    # Anemia   # Thrombocytopenia, resolved  Hgb stable, plts WNL, no signs or symptoms of active bleeding. Multifactorial.      # Depression  - PTA bupropion and amitriptyline     # Non severe malnutrition  - nutrition consulted    # Occipital headache  No neuro symptoms, normal exam. Suspect 2/2 muscle tension  - prn apap, heat    FEN: Regular diet  PROPHY:  Deferred  LINES:  PIV  DISPO:  Discharge pending therapy  CODE STATUS:  Full code    Kiesha Wilder DNP, NP-C  Advanced Heart Failure/Cardiology II Service  Pager [phone] [facility]  "[phone]    ================================================================    Subjective/24-Hr Events:   Last 24 hr care team notes reviewed. Felt shortness of breath last evening, CXR showed pulmonary edema. Better today. Tightness across chest improved. No fever, chills, or productive cough. No new complaints.      ROS:  4 point ROS including respiratory, CV, GI and  (other than that noted in the HPI) is negative.     Medications: Reviewed in EPIC.     Physical Exam:   /81   Pulse 99   Temp 98.4  F (36.9  C) (Oral)   Resp 14   Ht 1.727 m (5' 8\")   Wt 90.4 kg (199 lb 3.2 oz)   SpO2 98%   BMI 30.29 kg/m      GENERAL: Appears comfortable, seated at bedside.   HEENT: Eye symmetrical, no discharge or icterus bilaterally. Mucous membranes moist and without lesions.  NECK: Supple, JVD midneck at 90 degrees.   CV: Tachycardic and regular, +S1S2, no murmur, rub, or gallop.   RESPIRATORY: Respirations regular, even, and unlabored. Lungs dim at bases, no overt crackles or wheezes.   GI: Soft, obese and non distended with normoactive bowel sounds present in all quadrants. No tenderness, rebound, guarding.   EXTREMITIES: No peripheral edema. 2+ bilateral pedal pulses.   NEUROLOGIC: Alert and oriented x 3. No focal deficits.   MUSCULOSKELETAL: No joint swelling or tenderness.   SKIN: No jaundice. No rashes or lesions. Warm to touch.     Labs:  CMP  Recent Labs   Lab 07/02/21  0713 07/01/21  0644 06/30/21  0339 06/29/21  0631    132* 130* 129*   POTASSIUM 4.3 4.4 4.8 5.3   CHLORIDE 105 103 101 100   CO2 22 23 25 23   ANIONGAP 6 6 4 6   * 185* 157* 354*   BUN 40* 42* 47* 41*   CR 2.01* 2.09* 2.24* 1.81*   GFRESTIMATED 34* 32* 30* 39*   GFRESTBLACK 39* 38* 35* 45*   QAMAR 8.3* 8.0* 8.3* 8.3*   MAG 2.1  --  2.2 1.5*   PHOS  --   --  2.8  --    PROTTOTAL 5.3* 4.9* 5.1* 5.7*   ALBUMIN 2.2* 2.0* 2.2* 2.5*   BILITOTAL 0.3 0.3 0.5 0.6   ALKPHOS 223* 178* 165* 217*   AST 47* 53* 10 19   ALT 50 35 30 47 "       CBC  Recent Labs   Lab 07/02/21  0713 07/01/21  0644 06/30/21  0339 06/29/21 2024   WBC 4.8 6.3 7.5 7.8   RBC 2.70* 2.74* 3.04* 3.00*   HGB 7.9* 8.0* 8.9* 8.8*   HCT 25.4* 25.7* 28.1* 28.3*   MCV 94 94 92 94   MCH 29.3 29.2 29.3 29.3   MCHC 31.1* 31.1* 31.7 31.1*   RDW 15.8* 16.0* 15.9* 15.9*    180 184 164     Time/Communication  I personally spent a total of 35 minutes. Of that 25 minutes was counseling/coordination of patient's care. Plan of care discussed with patient. See my note above for details.    Patient discussed with Dr. Seth.

## 2021-07-02 NOTE — PLAN OF CARE
D AVSS with sat's 96% on room air. Heart regular/tachy and lung decreased in bases R>L. Voiced no c/o pain or nausea overnight and slept well between cares. Had BM  on day shift and voided adequate amounts into urinal at bedside. Blood glucose was 312 at 0200 and MD was paged. See one time order for Novolog 5 units.   I Vital's, assessment and med's per order.   A Resting in bed with call light in reach.   P Continue to monitor and update MD with changes.

## 2021-07-02 NOTE — PLAN OF CARE
Major Shift Events: A&Ox4. SR, HR 90-100s. Sating >97% on RA. Adequate UO via urinal. BM x1 this shift. Regular diet, minimal appetite. ACHS BG checks. Tylenol and tramadol given x1 for head pain. Ax1 to bedside commode.     Plan: Monitor BG. Work with therapy.     For vital signs and complete assessments, please see documentation flowsheets.

## 2021-07-03 ENCOUNTER — APPOINTMENT (OUTPATIENT)
Dept: CT IMAGING | Facility: CLINIC | Age: 66
DRG: 871 | End: 2021-07-03
Attending: NURSE PRACTITIONER
Payer: COMMERCIAL

## 2021-07-03 LAB
ALBUMIN SERPL-MCNC: 2.3 G/DL (ref 3.4–5)
ALP SERPL-CCNC: 262 U/L (ref 40–150)
ALT SERPL W P-5'-P-CCNC: 63 U/L (ref 0–70)
ANION GAP SERPL CALCULATED.3IONS-SCNC: 4 MMOL/L (ref 3–14)
ANISOCYTOSIS BLD QL SMEAR: SLIGHT
AST SERPL W P-5'-P-CCNC: 52 U/L (ref 0–45)
BASOPHILS # BLD AUTO: 0 10E9/L (ref 0–0.2)
BASOPHILS NFR BLD AUTO: 0 %
BILIRUB DIRECT SERPL-MCNC: 0.1 MG/DL (ref 0–0.2)
BILIRUB SERPL-MCNC: 0.3 MG/DL (ref 0.2–1.3)
BUN SERPL-MCNC: 35 MG/DL (ref 7–30)
BURR CELLS BLD QL SMEAR: ABNORMAL
CALCIUM SERPL-MCNC: 8.4 MG/DL (ref 8.5–10.1)
CHLORIDE SERPL-SCNC: 109 MMOL/L (ref 94–109)
CO2 SERPL-SCNC: 25 MMOL/L (ref 20–32)
CREAT SERPL-MCNC: 2.03 MG/DL (ref 0.66–1.25)
DIFFERENTIAL METHOD BLD: ABNORMAL
EOSINOPHIL # BLD AUTO: 0 10E9/L (ref 0–0.7)
EOSINOPHIL NFR BLD AUTO: 0.9 %
ERYTHROCYTE [DISTWIDTH] IN BLOOD BY AUTOMATED COUNT: 15.4 % (ref 10–15)
GFR SERPL CREATININE-BSD FRML MDRD: 34 ML/MIN/{1.73_M2}
GLUCOSE BLDC GLUCOMTR-MCNC: 142 MG/DL (ref 70–99)
GLUCOSE BLDC GLUCOMTR-MCNC: 157 MG/DL (ref 70–99)
GLUCOSE BLDC GLUCOMTR-MCNC: 193 MG/DL (ref 70–99)
GLUCOSE BLDC GLUCOMTR-MCNC: 200 MG/DL (ref 70–99)
GLUCOSE BLDC GLUCOMTR-MCNC: 236 MG/DL (ref 70–99)
GLUCOSE SERPL-MCNC: 138 MG/DL (ref 70–99)
HCT VFR BLD AUTO: 26.8 % (ref 40–53)
HGB BLD-MCNC: 8.2 G/DL (ref 13.3–17.7)
LYMPHOCYTES # BLD AUTO: 0.6 10E9/L (ref 0.8–5.3)
LYMPHOCYTES NFR BLD AUTO: 14.8 %
MAGNESIUM SERPL-MCNC: 2 MG/DL (ref 1.6–2.3)
MCH RBC QN AUTO: 28.7 PG (ref 26.5–33)
MCHC RBC AUTO-ENTMCNC: 30.6 G/DL (ref 31.5–36.5)
MCV RBC AUTO: 94 FL (ref 78–100)
MONOCYTES # BLD AUTO: 0.3 10E9/L (ref 0–1.3)
MONOCYTES NFR BLD AUTO: 7.4 %
NEUTROPHILS # BLD AUTO: 3.1 10E9/L (ref 1.6–8.3)
NEUTROPHILS NFR BLD AUTO: 76.9 %
OVALOCYTES BLD QL SMEAR: SLIGHT
PLATELET # BLD AUTO: 186 10E9/L (ref 150–450)
POIKILOCYTOSIS BLD QL SMEAR: ABNORMAL
POTASSIUM SERPL-SCNC: 4.8 MMOL/L (ref 3.4–5.3)
PROT SERPL-MCNC: 5.6 G/DL (ref 6.8–8.8)
RBC # BLD AUTO: 2.86 10E12/L (ref 4.4–5.9)
RBC INCLUSIONS BLD: SLIGHT
SODIUM SERPL-SCNC: 138 MMOL/L (ref 133–144)
WBC # BLD AUTO: 4 10E9/L (ref 4–11)

## 2021-07-03 PROCEDURE — 99233 SBSQ HOSP IP/OBS HIGH 50: CPT | Performed by: NURSE PRACTITIONER

## 2021-07-03 PROCEDURE — 250N000011 HC RX IP 250 OP 636: Performed by: STUDENT IN AN ORGANIZED HEALTH CARE EDUCATION/TRAINING PROGRAM

## 2021-07-03 PROCEDURE — 120N000003 HC R&B IMCU UMMC

## 2021-07-03 PROCEDURE — 70450 CT HEAD/BRAIN W/O DYE: CPT | Mod: 26 | Performed by: RADIOLOGY

## 2021-07-03 PROCEDURE — 85025 COMPLETE CBC W/AUTO DIFF WBC: CPT | Performed by: INTERNAL MEDICINE

## 2021-07-03 PROCEDURE — 999N001017 HC STATISTIC GLUCOSE BY METER IP

## 2021-07-03 PROCEDURE — 83735 ASSAY OF MAGNESIUM: CPT | Performed by: INTERNAL MEDICINE

## 2021-07-03 PROCEDURE — 250N000013 HC RX MED GY IP 250 OP 250 PS 637: Performed by: NURSE PRACTITIONER

## 2021-07-03 PROCEDURE — 94640 AIRWAY INHALATION TREATMENT: CPT

## 2021-07-03 PROCEDURE — 94640 AIRWAY INHALATION TREATMENT: CPT | Mod: 76

## 2021-07-03 PROCEDURE — 99207 PR SERVICE NOT STAFFED W/SUPERV PROV: CPT | Mod: 24 | Performed by: INTERNAL MEDICINE

## 2021-07-03 PROCEDURE — 80048 BASIC METABOLIC PNL TOTAL CA: CPT | Performed by: INTERNAL MEDICINE

## 2021-07-03 PROCEDURE — 250N000012 HC RX MED GY IP 250 OP 636 PS 637: Performed by: STUDENT IN AN ORGANIZED HEALTH CARE EDUCATION/TRAINING PROGRAM

## 2021-07-03 PROCEDURE — 999N000157 HC STATISTIC RCP TIME EA 10 MIN

## 2021-07-03 PROCEDURE — 250N000009 HC RX 250: Performed by: INTERNAL MEDICINE

## 2021-07-03 PROCEDURE — 250N000013 HC RX MED GY IP 250 OP 250 PS 637: Performed by: INTERNAL MEDICINE

## 2021-07-03 PROCEDURE — 80076 HEPATIC FUNCTION PANEL: CPT | Performed by: INTERNAL MEDICINE

## 2021-07-03 PROCEDURE — 250N000012 HC RX MED GY IP 250 OP 636 PS 637: Performed by: NURSE PRACTITIONER

## 2021-07-03 PROCEDURE — 70450 CT HEAD/BRAIN W/O DYE: CPT

## 2021-07-03 PROCEDURE — 36415 COLL VENOUS BLD VENIPUNCTURE: CPT | Performed by: INTERNAL MEDICINE

## 2021-07-03 PROCEDURE — 250N000013 HC RX MED GY IP 250 OP 250 PS 637: Performed by: STUDENT IN AN ORGANIZED HEALTH CARE EDUCATION/TRAINING PROGRAM

## 2021-07-03 RX ORDER — LEVOFLOXACIN 5 MG/ML
750 INJECTION, SOLUTION INTRAVENOUS EVERY 24 HOURS
Status: DISCONTINUED | OUTPATIENT
Start: 2021-07-03 | End: 2021-07-03

## 2021-07-03 RX ORDER — LEVOFLOXACIN 750 MG/1
750 TABLET, FILM COATED ORAL
Status: COMPLETED | OUTPATIENT
Start: 2021-07-03 | End: 2021-07-05

## 2021-07-03 RX ADMIN — Medication 25 MG: at 08:22

## 2021-07-03 RX ADMIN — Medication 10 MG: at 21:48

## 2021-07-03 RX ADMIN — NYSTATIN 1000000 UNITS: 500000 SUSPENSION ORAL at 11:25

## 2021-07-03 RX ADMIN — ALBUTEROL SULFATE 2.5 MG: 2.5 SOLUTION RESPIRATORY (INHALATION) at 13:05

## 2021-07-03 RX ADMIN — UMECLIDINIUM 1 PUFF: 62.5 AEROSOL, POWDER ORAL at 08:22

## 2021-07-03 RX ADMIN — FLUTICASONE FUROATE AND VILANTEROL TRIFENATATE 1 PUFF: 100; 25 POWDER RESPIRATORY (INHALATION) at 08:22

## 2021-07-03 RX ADMIN — LEVOFLOXACIN 750 MG: 750 TABLET, FILM COATED ORAL at 09:30

## 2021-07-03 RX ADMIN — TACROLIMUS 5 MG: 5 CAPSULE ORAL at 17:58

## 2021-07-03 RX ADMIN — BUPROPION HYDROCHLORIDE 75 MG: 75 TABLET, FILM COATED ORAL at 19:56

## 2021-07-03 RX ADMIN — Medication 1 TABLET: at 19:55

## 2021-07-03 RX ADMIN — NYSTATIN 1000000 UNITS: 500000 SUSPENSION ORAL at 19:55

## 2021-07-03 RX ADMIN — GABAPENTIN 300 MG: 300 CAPSULE ORAL at 19:55

## 2021-07-03 RX ADMIN — NYSTATIN 1000000 UNITS: 500000 SUSPENSION ORAL at 16:24

## 2021-07-03 RX ADMIN — PANTOPRAZOLE SODIUM 40 MG: 40 TABLET, DELAYED RELEASE ORAL at 08:23

## 2021-07-03 RX ADMIN — PREDNISONE 20 MG: 20 TABLET ORAL at 08:23

## 2021-07-03 RX ADMIN — MYCOPHENOLATE MOFETIL 1000 MG: 250 CAPSULE ORAL at 08:23

## 2021-07-03 RX ADMIN — ALBUTEROL SULFATE 2.5 MG: 2.5 SOLUTION RESPIRATORY (INHALATION) at 20:18

## 2021-07-03 RX ADMIN — MONTELUKAST 10 MG: 10 TABLET, FILM COATED ORAL at 21:48

## 2021-07-03 RX ADMIN — SULFAMETHOXAZOLE AND TRIMETHOPRIM 1 TABLET: 400; 80 TABLET ORAL at 08:23

## 2021-07-03 RX ADMIN — VALGANCICLOVIR 450 MG: 450 TABLET, FILM COATED ORAL at 08:23

## 2021-07-03 RX ADMIN — ROSUVASTATIN CALCIUM 10 MG: 5 TABLET, FILM COATED ORAL at 08:23

## 2021-07-03 RX ADMIN — BUPROPION HYDROCHLORIDE 75 MG: 75 TABLET, FILM COATED ORAL at 08:23

## 2021-07-03 RX ADMIN — DOCUSATE SODIUM 50 MG AND SENNOSIDES 8.6 MG 1 TABLET: 8.6; 5 TABLET, FILM COATED ORAL at 21:52

## 2021-07-03 RX ADMIN — TACROLIMUS 5 MG: 5 CAPSULE ORAL at 08:23

## 2021-07-03 RX ADMIN — GABAPENTIN 300 MG: 300 CAPSULE ORAL at 11:24

## 2021-07-03 RX ADMIN — MYCOPHENOLATE MOFETIL 1000 MG: 250 CAPSULE ORAL at 19:54

## 2021-07-03 RX ADMIN — Medication 1 TABLET: at 08:23

## 2021-07-03 RX ADMIN — PANTOPRAZOLE SODIUM 40 MG: 40 TABLET, DELAYED RELEASE ORAL at 19:55

## 2021-07-03 RX ADMIN — NYSTATIN 1000000 UNITS: 500000 SUSPENSION ORAL at 08:24

## 2021-07-03 RX ADMIN — CEFEPIME 2 G: 2 INJECTION, POWDER, FOR SOLUTION INTRAVENOUS at 02:16

## 2021-07-03 RX ADMIN — GABAPENTIN 300 MG: 300 CAPSULE ORAL at 08:23

## 2021-07-03 RX ADMIN — ASPIRIN 81 MG CHEWABLE TABLET 81 MG: 81 TABLET CHEWABLE at 08:22

## 2021-07-03 RX ADMIN — ALLOPURINOL 300 MG: 300 TABLET ORAL at 08:23

## 2021-07-03 RX ADMIN — ACETAMINOPHEN 975 MG: 325 TABLET, FILM COATED ORAL at 11:24

## 2021-07-03 RX ADMIN — ALBUTEROL SULFATE 2.5 MG: 2.5 SOLUTION RESPIRATORY (INHALATION) at 07:37

## 2021-07-03 RX ADMIN — AMITRIPTYLINE HYDROCHLORIDE 25 MG: 25 TABLET, FILM COATED ORAL at 21:48

## 2021-07-03 NOTE — PLAN OF CARE
OT: Cancel,  Pt waiting for CT on first attempt. Per PT, Pt reporting increased fatigue, tremors, and blurry vision in PM (per RN pt also orthostatic). Rescheduled per POC.

## 2021-07-03 NOTE — PLAN OF CARE
D AVSS with sat's 96% on room air. Heart regular/tachy and lung decreased in bases R>L. Voiced no c/o pain or nausea overnight and slept well between cares. Had BM  on day shift and voided adequate amounts into urinal at bedside. Blood glucose was 200 at 0200 which was improved from last night since increasing Lantus dose.   I Vital's, assessment and med's per order.   A Resting in bed with call light in reach.   P Continue to monitor and update MD with changes.

## 2021-07-03 NOTE — PROGRESS NOTES
Brief Endocrinology Follow up Note    Assessment and plan:     Type II Diabetes  Hx of postprandial hypoglycemia    Recent Labs   Lab 07/03/21  1758 07/03/21  1241 07/03/21  0724 07/03/21  0702 07/03/21  0220 07/02/21  2151 07/02/21  1819 07/02/21  0713 07/02/21  0713 07/01/21  0644 07/01/21  0644 06/30/21  0339 06/30/21  0339 06/29/21  0631 06/29/21  0631 06/28/21 2208 06/28/21 2208   GLC  --   --   --  138*  --   --   --   --  170*  --  185*  --  157*  --  354*  --  487*   * 157* 142*  --  200* 285* 228*   < >  --    < >  --    < >  --    < >  --    < >  --     < > = values in this interval not displayed.     Elevated postprandial BG to 285 on 7/2/2021    Plan  - Increase meal time novolog to 1 unit for every 8 g of carbs (from 1 unit to 15 carbs) - ordered  - Continue glargine 24 units qAM  - Continue high dose sliding scale insulin (1 unit for every 50 over 140)  - glucose checks before meals, at bedtime, at 0200  - hypoglycemia protocol  - If dose of prednisone is changed during hospitalization or at time of discharge, please let endocrine team know as this will alter plan for insulin  - Diabetes educator consult placed to ensure patient has a plan for monitoring glucose and administering insulin at time of discharge (had not been taking insulin prior to admission)     Patient seen and examined with attending, Dr. Felicita Ku  PGY 5  Fellow  Division of Endocrinology    Attending tie-in note  I discussed about the patient with endocrine fellow Dr. Ku. Agree above note and plan.       Jacque Mims MD  Staff Physician  Endocrinology and Metabolism  [facility]  License: [id number]  Pager: [phone]

## 2021-07-03 NOTE — PLAN OF CARE
Major Shift Events: A&Ox4. Pt reported worsening blurry vision, dizziness, and tremors this morning, NP notified. Head CT completed and ophthalmology consulted. SR, HR 90-100s. Orthostatic BP completed. 123/79 (lying), 126/80 (sitting), 97/84 (Standing). Sating >97% on RA. Adequate UO via urinal. No BM this shift. Regular diet, minimal appetite. ACHS BG checks. Tylenol and tramadol given x1 for head pain. Ax1 to chair.     Plan: Monitor BG. Work with therapy.      For vital signs and complete assessments, please see documentation flowsheets.

## 2021-07-03 NOTE — PLAN OF CARE
PT / 6D - Cxl. Pt not feeling well and declining on first attempt. Pt reporting increased fatigue, tremors, and blurry vision on second attempt (per RN pt also orthostatic). Rescheduled.

## 2021-07-03 NOTE — PROGRESS NOTES
[facility]   Cardiology II Service / Advanced Heart Failure  Daily Progress Note      Patient: Jim Willingham  MRN: [id number]  Admission Date: 6/28/2021  Hospital Day # 5    Assessment and Plan: Jim Willingham is a 66 year old male with history of NICM s/p OHT (5/6/21) postoperative course was complicated by right pleural air leak with prolonged chest tube, COPD, CKDIII, DMII, anemia who presents with SOB, cough, fevers/chills, fatigue found to have sepsis felt secondary to CAP.    Today's Plan:  -head CT  -optho consult  -change cefepime to levaquin given vague neuro symptoms   -consult neuro if no improvement  -continue to work with therapies, discharge when safe for home    # Sepsis secondary to bacterial CAP/RLL infiltrate  # Chronic immunosupression  # Moderate pleural effusion  # Prior right pleural air leak s/p prolonged chest tube  # 1 of 2 blood cultures with staph epi 2/2 contaminant  P/w two days of non-productive cough, SOB, fevers/chills, fatigue with + sick contacts at home (wife with URI). Febrile (102.7), tachycardic (120s-150s), new/worse RLL consolidation with moderate effusion. Pan CT unrevealing for alternative source. Low suspicion for COPD exacerbation. COVID negative. Fungal unlikely given acuity. RVP negative.   - transplant ID consulted   - cefepime started 6/28 plan for 7d course through 7/4; switch to levaquin 750 mg daily given neuro symptoms today   - sp 3d azithromycin 6/29-7/1   - sp vancomycin 6/29-7/1 (48 hr after negative blood cx)  - pulmonary consulted   - diagnostic and therapeutic thoracentesis 6/29, 270mL removed   - pleural cx ngtd  - blood cultures 1 of 2 bottles 6/28 with staph epi, suspect contaminant  - repeat blood cultures 6/30 pending, ngtd  - pleural fluid cx pending, ngtd  - strep Ag negative  - histo and blasto negative  - aspergillus negative  - fungitell negative and fungal blood cx negative  - MRSA nares negative  - EBV/CMV negative  - repeat chest  CT in 2-3 weeks per ID to assure no e/o fungal PNA (~7/14-7/21)    # Status post OHT on 5/6/21, history of NICM and HMII LVAD  # Chronic immunosuppression  # Sinus tachycardia, exacerbated in the setting of sepsis   * TTE 6/7/21: EF 65-60%, RV normal, IVC normal  * RHC 6/21/21: RA 9 PA 32/20(25) PCWP 13 Kee CO/CI 6.1/3.1  * Angiogram deferred due to renal dysfunction    Graft Function:   --Volume: grossly euvolemic, RHC on Monday per protocol  --BP: wnl  --HR: 90s-100s    Immunosuppression:   --prednisone 20 mg daily per outpt, taper per protocol   --Cellcept 1000 mg BID (decreased 6/21 due to abnormal CXR per Dr Montgomery)  --tacrolimus 5 mg bid, trough 9.8 today (12.5hr) (goal 10-12)    Serostatus: CMV: D+/R+, EBV: D+/R+, Toxo: D-/R-    Prophylaxis:   --CAV: aspirin 81 mg, rosuvastatin 10 mg daily  --Thrush: Nystatin   --PCP: Bactrim single strength EOD (renally dosed)  --GI: Protonix   --Osteoporosis: calcium/vitamin D   --CMV: Valcyte 450 mg EOD x 3 mos renally dosed    # Blurry vision, worsening  # Vision changes  # Mild frontal HA  # Dizziness  # Tremor, worsening  Blurry vision started 7/2, worsened AM of 7/3. New vision changes bilaterally, no eye pain. Also reporting vague HA, worsening tremors, and dizziness. Neuro exam is non focal. Tremors mild on exam. Blood sugars mostly within goal.   - stat head CT, though low suspicion of event  - optho consult to r/o elevated IOP given chronic steroids  - change cefepime to levaquin  - orthostatic BP  - tacro level yesterday wnl, recheck in AM  - consider head MRI to r/o PRES if all above wnl and not improved off cefepime, will have to check retained ICD lead fragment compatibility     # Hypovolemic hyponatremia, resolved  Na 125 on admission from wnl last check, improved to 129 with IVF. Also hyperglycemia BS 300s so corrected, Na 133.   - Na 138 today  - daily BMP    # CKD stage III  Cr 1.85 on admission from recent baseline ~2.   - daily BMP  - monitor tacro  "closely     # DMII  Recent BS 300s on BMPs, 487 on admission, now improved < 200.   - endocrine diabetes team managing    # Anemia   # Thrombocytopenia, resolved  Hgb stable, plts WNL, no signs or symptoms of active bleeding. Multifactorial.      # Depression  - PTA bupropion and amitriptyline     # Non severe malnutrition  - nutrition consulted    # Occipital headache  No neuro symptoms, normal exam. Suspect 2/2 muscle tension  - prn apap, heat    FEN: Regular diet  PROPHY:  Deferred  LINES:  PIV  DISPO:  Discharge pending therapy   CODE STATUS:  Full code    Kiesha Wilder, JOSE, NP-C  Advanced Heart Failure/Cardiology II Service  Pager [phone] [facility] [id number]    ================================================================    Subjective/24-Hr Events:   Last 24 hr care team notes reviewed. Breathing stable, no fever or chills. Describes several new or worsening symptoms today including blurry vision, \"flares\" in vision bilaterally, vague HA, dizziness, worsening tremor, and generally feeling unwell.     ROS:  4 point ROS including respiratory, CV, GI and  (other than that noted in the HPI) is negative.     Medications: Reviewed in EPIC.     Physical Exam:   /73   Pulse 96   Temp 98  F (36.7  C) (Oral)   Resp 14   Ht 1.727 m (5' 8\")   Wt 89.8 kg (198 lb)   SpO2 97%   BMI 30.11 kg/m      GENERAL: Appears comfortable, seated at bedside.   HEENT: Eye symmetrical, no discharge or icterus bilaterally. Mucous membranes moist and without lesions.  NECK: Supple, JVD 3cm above clavicle at 75 degrees.   CV: Tachycardic and regular, +S1S2, no murmur, rub, or gallop.   RESPIRATORY: Respirations regular, even, and unlabored. Lungs dim at bases, no overt crackles or wheezes.   GI: Soft, obese and non distended with normoactive bowel sounds present in all quadrants. No tenderness, rebound, guarding.   EXTREMITIES: No peripheral edema. 2+ bilateral pedal pulses.   NEUROLOGIC: Alert and oriented x 3. No focal " deficits. CNII-XII intact.   MUSCULOSKELETAL: No joint swelling or tenderness.   SKIN: No jaundice. No rashes or lesions. Warm to touch.     Labs:  CMP  Recent Labs   Lab 07/03/21  0702 07/02/21  0713 07/01/21  0644 06/30/21  0339 06/29/21  0631    133 132* 130* 129*   POTASSIUM 4.8 4.3 4.4 4.8 5.3   CHLORIDE 109 105 103 101 100   CO2 25 22 23 25 23   ANIONGAP 4 6 6 4 6   * 170* 185* 157* 354*   BUN 35* 40* 42* 47* 41*   CR 2.03* 2.01* 2.09* 2.24* 1.81*   GFRESTIMATED 34* 34* 32* 30* 39*   GFRESTBLACK 39* 39* 38* 35* 45*   QAMAR 8.4* 8.3* 8.0* 8.3* 8.3*   MAG 2.0 2.1  --  2.2 1.5*   PHOS  --   --   --  2.8  --    PROTTOTAL 5.6* 5.3* 4.9* 5.1* 5.7*   ALBUMIN 2.3* 2.2* 2.0* 2.2* 2.5*   BILITOTAL 0.3 0.3 0.3 0.5 0.6   ALKPHOS 262* 223* 178* 165* 217*   AST 52* 47* 53* 10 19   ALT 63 50 35 30 47       CBC  Recent Labs   Lab 07/03/21  0702 07/02/21  0713 07/01/21 0644 06/30/21  0339   WBC 4.0 4.8 6.3 7.5   RBC 2.86* 2.70* 2.74* 3.04*   HGB 8.2* 7.9* 8.0* 8.9*   HCT 26.8* 25.4* 25.7* 28.1*   MCV 94 94 94 92   MCH 28.7 29.3 29.2 29.3   MCHC 30.6* 31.1* 31.1* 31.7   RDW 15.4* 15.8* 16.0* 15.9*    179 180 184     Time/Communication  I personally spent a total of 45 minutes. Of that 25 minutes was counseling/coordination of patient's care. Plan of care discussed with patient. See my note above for details.    Patient discussed with Dr. Seth.

## 2021-07-04 LAB
ALBUMIN SERPL-MCNC: 2.2 G/DL (ref 3.4–5)
ALP SERPL-CCNC: 248 U/L (ref 40–150)
ALT SERPL W P-5'-P-CCNC: 50 U/L (ref 0–70)
ANION GAP SERPL CALCULATED.3IONS-SCNC: 5 MMOL/L (ref 3–14)
ASPERGILLUS AB SER QL ID: NORMAL
AST SERPL W P-5'-P-CCNC: 25 U/L (ref 0–45)
B DERMAT AB SER QL ID: NORMAL
BACTERIA SPEC CULT: NO GROWTH
BACTERIA SPEC CULT: NO GROWTH
BASOPHILS # BLD AUTO: 0 10E9/L (ref 0–0.2)
BASOPHILS NFR BLD AUTO: 0 %
BILIRUB DIRECT SERPL-MCNC: <0.1 MG/DL (ref 0–0.2)
BILIRUB SERPL-MCNC: 0.4 MG/DL (ref 0.2–1.3)
BUN SERPL-MCNC: 34 MG/DL (ref 7–30)
BURR CELLS BLD QL SMEAR: SLIGHT
CALCIUM SERPL-MCNC: 8.1 MG/DL (ref 8.5–10.1)
CHLORIDE SERPL-SCNC: 107 MMOL/L (ref 94–109)
CO2 SERPL-SCNC: 23 MMOL/L (ref 20–32)
COCCIDIOIDES AB SPEC QL ID: NORMAL
CREAT SERPL-MCNC: 2.18 MG/DL (ref 0.66–1.25)
DIFFERENTIAL METHOD BLD: ABNORMAL
EOSINOPHIL # BLD AUTO: 0 10E9/L (ref 0–0.7)
EOSINOPHIL NFR BLD AUTO: 0.9 %
ERYTHROCYTE [DISTWIDTH] IN BLOOD BY AUTOMATED COUNT: 15.3 % (ref 10–15)
GFR SERPL CREATININE-BSD FRML MDRD: 31 ML/MIN/{1.73_M2}
GLUCOSE BLDC GLUCOMTR-MCNC: 140 MG/DL (ref 70–99)
GLUCOSE BLDC GLUCOMTR-MCNC: 160 MG/DL (ref 70–99)
GLUCOSE BLDC GLUCOMTR-MCNC: 165 MG/DL (ref 70–99)
GLUCOSE BLDC GLUCOMTR-MCNC: 168 MG/DL (ref 70–99)
GLUCOSE BLDC GLUCOMTR-MCNC: 215 MG/DL (ref 70–99)
GLUCOSE SERPL-MCNC: 132 MG/DL (ref 70–99)
H CAPSUL AB TITR SER ID: NORMAL {TITER}
HCT VFR BLD AUTO: 25.8 % (ref 40–53)
HGB BLD-MCNC: 8 G/DL (ref 13.3–17.7)
LYMPHOCYTES # BLD AUTO: 0.4 10E9/L (ref 0.8–5.3)
LYMPHOCYTES NFR BLD AUTO: 12.3 %
MAGNESIUM SERPL-MCNC: 1.9 MG/DL (ref 1.6–2.3)
MCH RBC QN AUTO: 28.8 PG (ref 26.5–33)
MCHC RBC AUTO-ENTMCNC: 31 G/DL (ref 31.5–36.5)
MCV RBC AUTO: 93 FL (ref 78–100)
METAMYELOCYTES # BLD: 0 10E9/L
METAMYELOCYTES NFR BLD MANUAL: 0.9 %
MONOCYTES # BLD AUTO: 0.4 10E9/L (ref 0–1.3)
MONOCYTES NFR BLD AUTO: 10.5 %
MYELOCYTES # BLD: 0 10E9/L
MYELOCYTES NFR BLD MANUAL: 0.9 %
NEUTROPHILS # BLD AUTO: 2.6 10E9/L (ref 1.6–8.3)
NEUTROPHILS NFR BLD AUTO: 74.5 %
OVALOCYTES BLD QL SMEAR: SLIGHT
PLATELET # BLD AUTO: 166 10E9/L (ref 150–450)
PLATELET # BLD EST: ABNORMAL 10*3/UL
POIKILOCYTOSIS BLD QL SMEAR: SLIGHT
POTASSIUM SERPL-SCNC: 4.8 MMOL/L (ref 3.4–5.3)
PROT SERPL-MCNC: 5.2 G/DL (ref 6.8–8.8)
RBC # BLD AUTO: 2.78 10E12/L (ref 4.4–5.9)
SODIUM SERPL-SCNC: 134 MMOL/L (ref 133–144)
SPECIMEN SOURCE: NORMAL
SPECIMEN SOURCE: NORMAL
TACROLIMUS BLD-MCNC: 12.9 UG/L (ref 5–15)
TME LAST DOSE: NORMAL H
WBC # BLD AUTO: 3.5 10E9/L (ref 4–11)

## 2021-07-04 PROCEDURE — 80048 BASIC METABOLIC PNL TOTAL CA: CPT | Performed by: INTERNAL MEDICINE

## 2021-07-04 PROCEDURE — 120N000003 HC R&B IMCU UMMC

## 2021-07-04 PROCEDURE — 99232 SBSQ HOSP IP/OBS MODERATE 35: CPT | Mod: GC | Performed by: INTERNAL MEDICINE

## 2021-07-04 PROCEDURE — 36415 COLL VENOUS BLD VENIPUNCTURE: CPT | Performed by: NURSE PRACTITIONER

## 2021-07-04 PROCEDURE — 250N000012 HC RX MED GY IP 250 OP 636 PS 637: Performed by: STUDENT IN AN ORGANIZED HEALTH CARE EDUCATION/TRAINING PROGRAM

## 2021-07-04 PROCEDURE — 999N001017 HC STATISTIC GLUCOSE BY METER IP

## 2021-07-04 PROCEDURE — 85025 COMPLETE CBC W/AUTO DIFF WBC: CPT | Performed by: INTERNAL MEDICINE

## 2021-07-04 PROCEDURE — 94640 AIRWAY INHALATION TREATMENT: CPT | Mod: 76

## 2021-07-04 PROCEDURE — 250N000013 HC RX MED GY IP 250 OP 250 PS 637: Performed by: STUDENT IN AN ORGANIZED HEALTH CARE EDUCATION/TRAINING PROGRAM

## 2021-07-04 PROCEDURE — 94640 AIRWAY INHALATION TREATMENT: CPT

## 2021-07-04 PROCEDURE — 99207 PR SERVICE NOT STAFFED W/SUPERV PROV: CPT | Mod: 24 | Performed by: INTERNAL MEDICINE

## 2021-07-04 PROCEDURE — 80076 HEPATIC FUNCTION PANEL: CPT | Performed by: INTERNAL MEDICINE

## 2021-07-04 PROCEDURE — 80197 ASSAY OF TACROLIMUS: CPT | Performed by: NURSE PRACTITIONER

## 2021-07-04 PROCEDURE — 36415 COLL VENOUS BLD VENIPUNCTURE: CPT | Performed by: INTERNAL MEDICINE

## 2021-07-04 PROCEDURE — 250N000009 HC RX 250: Performed by: INTERNAL MEDICINE

## 2021-07-04 PROCEDURE — 250N000013 HC RX MED GY IP 250 OP 250 PS 637: Performed by: NURSE PRACTITIONER

## 2021-07-04 PROCEDURE — 83735 ASSAY OF MAGNESIUM: CPT | Performed by: INTERNAL MEDICINE

## 2021-07-04 PROCEDURE — 999N000157 HC STATISTIC RCP TIME EA 10 MIN

## 2021-07-04 RX ADMIN — ALBUTEROL SULFATE 2.5 MG: 2.5 SOLUTION RESPIRATORY (INHALATION) at 08:18

## 2021-07-04 RX ADMIN — ASPIRIN 81 MG CHEWABLE TABLET 81 MG: 81 TABLET CHEWABLE at 07:28

## 2021-07-04 RX ADMIN — ALLOPURINOL 300 MG: 300 TABLET ORAL at 07:28

## 2021-07-04 RX ADMIN — AMITRIPTYLINE HYDROCHLORIDE 25 MG: 25 TABLET, FILM COATED ORAL at 23:12

## 2021-07-04 RX ADMIN — Medication 50 MG: at 07:28

## 2021-07-04 RX ADMIN — GABAPENTIN 300 MG: 300 CAPSULE ORAL at 20:14

## 2021-07-04 RX ADMIN — TACROLIMUS 5 MG: 5 CAPSULE ORAL at 07:28

## 2021-07-04 RX ADMIN — GABAPENTIN 300 MG: 300 CAPSULE ORAL at 11:37

## 2021-07-04 RX ADMIN — ACETAMINOPHEN 975 MG: 325 TABLET, FILM COATED ORAL at 20:15

## 2021-07-04 RX ADMIN — Medication 50 MG: at 23:11

## 2021-07-04 RX ADMIN — FLUTICASONE FUROATE AND VILANTEROL TRIFENATATE 1 PUFF: 100; 25 POWDER RESPIRATORY (INHALATION) at 07:48

## 2021-07-04 RX ADMIN — MYCOPHENOLATE MOFETIL 1000 MG: 250 CAPSULE ORAL at 07:28

## 2021-07-04 RX ADMIN — UMECLIDINIUM 1 PUFF: 62.5 AEROSOL, POWDER ORAL at 07:29

## 2021-07-04 RX ADMIN — NYSTATIN 1000000 UNITS: 500000 SUSPENSION ORAL at 11:37

## 2021-07-04 RX ADMIN — Medication 1 TABLET: at 07:28

## 2021-07-04 RX ADMIN — GABAPENTIN 300 MG: 300 CAPSULE ORAL at 07:28

## 2021-07-04 RX ADMIN — ROSUVASTATIN CALCIUM 10 MG: 5 TABLET, FILM COATED ORAL at 09:15

## 2021-07-04 RX ADMIN — DOCUSATE SODIUM 50 MG AND SENNOSIDES 8.6 MG 1 TABLET: 8.6; 5 TABLET, FILM COATED ORAL at 23:11

## 2021-07-04 RX ADMIN — PANTOPRAZOLE SODIUM 40 MG: 40 TABLET, DELAYED RELEASE ORAL at 07:29

## 2021-07-04 RX ADMIN — MYCOPHENOLATE MOFETIL 1000 MG: 250 CAPSULE ORAL at 20:06

## 2021-07-04 RX ADMIN — PREDNISONE 20 MG: 20 TABLET ORAL at 07:28

## 2021-07-04 RX ADMIN — NYSTATIN 1000000 UNITS: 500000 SUSPENSION ORAL at 20:14

## 2021-07-04 RX ADMIN — BUPROPION HYDROCHLORIDE 75 MG: 75 TABLET, FILM COATED ORAL at 20:14

## 2021-07-04 RX ADMIN — PANTOPRAZOLE SODIUM 40 MG: 40 TABLET, DELAYED RELEASE ORAL at 20:14

## 2021-07-04 RX ADMIN — ALBUTEROL SULFATE 2.5 MG: 2.5 SOLUTION RESPIRATORY (INHALATION) at 20:53

## 2021-07-04 RX ADMIN — BUPROPION HYDROCHLORIDE 75 MG: 75 TABLET, FILM COATED ORAL at 07:28

## 2021-07-04 RX ADMIN — NYSTATIN 1000000 UNITS: 500000 SUSPENSION ORAL at 17:23

## 2021-07-04 RX ADMIN — SULFAMETHOXAZOLE AND TRIMETHOPRIM 1 TABLET: 400; 80 TABLET ORAL at 07:29

## 2021-07-04 RX ADMIN — Medication 50 MG: at 16:21

## 2021-07-04 RX ADMIN — MONTELUKAST 10 MG: 10 TABLET, FILM COATED ORAL at 23:11

## 2021-07-04 RX ADMIN — POLYETHYLENE GLYCOL 3350 17 G: 17 POWDER, FOR SOLUTION ORAL at 07:29

## 2021-07-04 RX ADMIN — Medication 10 MG: at 23:11

## 2021-07-04 RX ADMIN — NYSTATIN 1000000 UNITS: 500000 SUSPENSION ORAL at 07:29

## 2021-07-04 RX ADMIN — Medication 1 TABLET: at 20:14

## 2021-07-04 RX ADMIN — TACROLIMUS 4.5 MG: 1 CAPSULE ORAL at 18:09

## 2021-07-04 NOTE — PROGRESS NOTES
[facility]   Cardiology II Service / Advanced Heart Failure  Daily Progress Note      Patient: Jim Willingham  MRN: [id number]  Admission Date: 6/28/2021  Hospital Day # 6    Assessment and Plan: Jim Willingham is a 66 year old male with history of NICM s/p OHT (5/6/21) postoperative course was complicated by right pleural air leak with prolonged chest tube, COPD, CKDIII, DMII, anemia who presents with SOB, cough, fevers/chills, fatigue found to have sepsis felt secondary to CAP.    Today's Plan:  -No major changes today  -Decreased evening Tacro 4.5 mg. Repeat tacro level tomorow    # Sepsis secondary to bacterial CAP/RLL infiltrate  # Chronic immunosupression  # Moderate pleural effusion  # Prior right pleural air leak s/p prolonged chest tube  # 1 of 2 blood cultures with staph epi 2/2 contaminant  P/w two days of non-productive cough, SOB, fevers/chills, fatigue with + sick contacts at home (wife with URI). Febrile (102.7), tachycardic (120s-150s), new/worse RLL consolidation with moderate effusion. Pan CT unrevealing for alternative source. Low suspicion for COPD exacerbation. COVID negative. Fungal unlikely given acuity. RVP negative.   - transplant ID consulted   - cefepime started 6/28 plan for 7d course through 7/4; switch to levaquin 750 mg daily given neuro symptoms today   - sp 3d azithromycin 6/29-7/1   - sp vancomycin 6/29-7/1 (48 hr after negative blood cx)  - pulmonary consulted   - diagnostic and therapeutic thoracentesis 6/29, 270mL removed   - pleural cx ngtd  - blood cultures 1 of 2 bottles 6/28 with staph epi, suspect contaminant  - repeat blood cultures 6/30 pending, ngtd  - pleural fluid cx pending, ngtd  - strep Ag negative  - histo and blasto negative  - aspergillus negative  - fungitell negative and fungal blood cx negative  - MRSA nares negative  - EBV/CMV negative  - repeat chest CT in 2-3 weeks per ID to assure no e/o fungal PNA (~7/14-7/21)    # Status post OHT on 5/6/21,  history of NICM and HMII LVAD  # Chronic immunosuppression  # Sinus tachycardia, exacerbated in the setting of sepsis   * TTE 6/7/21: EF 65-60%, RV normal, IVC normal  * RHC 6/21/21: RA 9 PA 32/20(25) PCWP 13 Kee CO/CI 6.1/3.1  * Angiogram deferred due to renal dysfunction    Graft Function:   --Volume: grossly euvolemic, RHC on Monday per protocol  --BP: wnl  --HR: 90s-100s    Immunosuppression:   --prednisone 20 mg daily per outpt, taper per protocol   --Cellcept 1000 mg BID (decreased 6/21 due to abnormal CXR per Dr Montgomery)  --tacrolimus 5 mg bid, trough 9.8 today (12.5hr) (goal 10-12)    Serostatus: CMV: D+/R+, EBV: D+/R+, Toxo: D-/R-    Prophylaxis:   --CAV: aspirin 81 mg, rosuvastatin 10 mg daily  --Thrush: Nystatin   --PCP: Bactrim single strength EOD (renally dosed)  --GI: Protonix   --Osteoporosis: calcium/vitamin D   --CMV: Valcyte 450 mg EOD x 3 mos renally dosed    # Blurry vision, stable  # Mild frontal HA, improved  # Dizziness  # Tremor  Blurry vision started 7/2, worsened AM of 7/3. New vision changes bilaterally, no eye pain. Also reporting vague HA, worsening tremors, and dizziness. Neuro exam is non focal. Tremors mild on exam. Blood sugars mostly within goal.   - optho consult - recommended artificial tears  - consider head MRI to r/o PRES if all above wnl and not improved off cefepime, will have to check retained ICD lead fragment compatibility     # Hypovolemic hyponatremia, resolved  Na 125 on admission from wnl last check, improved to 129 with IVF. Also hyperglycemia BS 300s so corrected, Na 133.   - Na 138 today  - daily BMP    # CKD stage III  Cr 1.85 on admission from recent baseline ~2.   - daily BMP  - monitor tacro closely     # DMII  Recent BS 300s on BMPs, 487 on admission, now improved < 200.   - endocrine diabetes team managing    # Anemia   # Thrombocytopenia, resolved  Hgb stable, plts WNL, no signs or symptoms of active bleeding. Multifactorial.      # Depression  -  "PTA bupropion and amitriptyline     # Non severe malnutrition  - nutrition consulted    # Occipital headache  No neuro symptoms, normal exam. Suspect 2/2 muscle tension  - prn apap, heat    FEN: Regular diet  PROPHY:  Deferred  LINES:  PIV  DISPO:  Discharge pending therapy   CODE STATUS:  Full code    Patient discussed with Dr. Seth.    Kamran Hong MD  Advanced Heart Failure/Cardiology II Service  ASCOM [id number]    ================================================================    Subjective/24-Hr Events:   Last 24 hr care team notes reviewed.     No major overnight events. Vitals stable. Continues to endorse weakness but no new physical complaint.    ROS:  4 point ROS including respiratory, CV, GI and  (other than that noted in the HPI) is negative.     Medications: Reviewed in EPIC.     Physical Exam:   /81 (BP Location: Left arm)   Pulse 102   Temp 98.1  F (36.7  C) (Oral)   Resp 17   Ht 1.727 m (5' 8\")   Wt 89.8 kg (198 lb)   SpO2 97%   BMI 30.11 kg/m      GENERAL: Appears comfortable, seating and getting nebs from RT  HEENT: Eye symmetrical, no discharge or icterus bilaterally. Mucous membranes moist and without lesions.  NECK: Supple, JVD 3cm above clavicle at 75 degrees.   CV: Tachycardic and regular, +S1S2, no murmur, rub, or gallop.   RESPIRATORY: Respirations regular, even, and unlabored. Lungs dim at bases, no overt crackles or wheezes.   GI: Soft, obese and non distended with normoactive bowel sounds present in all quadrants. No tenderness, rebound, guarding.   EXTREMITIES: No peripheral edema. 2+ bilateral pedal pulses.   NEUROLOGIC: Alert and oriented x 3. No focal deficits. CNII-XII intact.   MUSCULOSKELETAL: No joint swelling or tenderness.   SKIN: No jaundice. No rashes or lesions. Warm to touch.     Labs:  CMP  Recent Labs   Lab 07/04/21  0721 07/03/21  0702 07/02/21  0713 07/01/21  0644 06/30/21  0339    138 133 132* 130*   POTASSIUM 4.8 4.8 4.3 4.4 4.8   CHLORIDE 107 109 " 105 103 101   CO2 23 25 22 23 25   ANIONGAP 5 4 6 6 4   * 138* 170* 185* 157*   BUN 34* 35* 40* 42* 47*   CR 2.18* 2.03* 2.01* 2.09* 2.24*   GFRESTIMATED 31* 34* 34* 32* 30*   GFRESTBLACK 36* 39* 39* 38* 35*   QAMAR 8.1* 8.4* 8.3* 8.0* 8.3*   MAG 1.9 2.0 2.1  --  2.2   PHOS  --   --   --   --  2.8   PROTTOTAL 5.2* 5.6* 5.3* 4.9* 5.1*   ALBUMIN 2.2* 2.3* 2.2* 2.0* 2.2*   BILITOTAL 0.4 0.3 0.3 0.3 0.5   ALKPHOS 248* 262* 223* 178* 165*   AST 25 52* 47* 53* 10   ALT 50 63 50 35 30       CBC  Recent Labs   Lab 07/04/21  0721 07/03/21  0702 07/02/21  0713 07/01/21  0644   WBC 3.5* 4.0 4.8 6.3   RBC 2.78* 2.86* 2.70* 2.74*   HGB 8.0* 8.2* 7.9* 8.0*   HCT 25.8* 26.8* 25.4* 25.7*   MCV 93 94 94 94   MCH 28.8 28.7 29.3 29.2   MCHC 31.0* 30.6* 31.1* 31.1*   RDW 15.3* 15.4* 15.8* 16.0*    186 179 180     .

## 2021-07-04 NOTE — CONSULTS
OPHTHALMOLOGY CONSULT NOTE  07/03/21    Patient: Jim Willingham      HISTORY OF PRESENTING ILLNESS:     Jim Willingham is a 66 year old male who is admitted for pneumonia in setting of recent heart transplant (5/6/21) who during hospitalization has had new onset transient blurry vision.      Notes that he gets mild blurring of his vision for a minute or two. This has happened a few times in the past few days. It is associated with light headedness that can happen in unpredictable timing. Vision does not grey out of black out, and there is no field cut. No new floaters and no new flashes. Vision returns to fully normal between the episodes.     Hospitalization significant for sepsis secondary to community acquired pneumonia, s/p heart transplant for non-ischemic cardiomyopathy now on chronic immune suppression.     Patient noted to have mildly positive orthostatic measurements.       10+ review of systems were otherwise negative except for that which has been stated above.      OCULAR/MEDICAL/SURGICAL HISTORIES:     Past Ocular History:  CE IOL each eye ~ 4 years ago in [place]     Past Medical History:   Diagnosis Date     Bariatric surgery status 2003     Benign essential hypertension 05/11/2017     Bilateral carpal tunnel syndrome 11/02/2020     Cardiomyopathy, unspecified (H) 05/08/2017     CKD (chronic kidney disease) stage 3, GFR 30-59 ml/min 05/11/2017     COPD (chronic obstructive pulmonary disease) (H) 11/02/2020     Depression 05/11/2017     Diabetes mellitus (H) 1995     Gouty arthropathy, chronic, without tophi 11/02/2020     H/O gastric bypass 05/11/2017     ICD (implantable cardioverter-defibrillator), biventricular, in situ 05/11/2017     LVAD (left ventricular assist device) present (H)      Major depression, recurrent, chronic (H) 11/02/2020     NICM (nonischemic cardiomyopathy) (H)/ EF 20% 05/11/2017    ECHO: LVEDd. 7.66 cm, Restrictive pattern , Severe mitral valve regurgitation     CECILIA  (obstructive sleep apnea) 05/11/2017     Paroxysmal atrial fibrillation (H) 05/11/2017     Paroxysmal VT (H) 05/11/2017     Rotator cuff tear arthropathy of both shoulders 11/02/2020     Uncomplicated asthma      Vitamin B12 deficiency (non anemic) 05/11/2017       Past Surgical History:   Procedure Laterality Date     ANESTHESIA CARDIOVERSION N/A 05/11/2020    Procedure: ANESTHESIA, FOR CARDIOVERSION @1100;  Surgeon: GENERIC ANESTHESIA PROVIDER;  Location: [facility] OR     CV HEART BIOPSY N/A 5/13/2021    Procedure: Heart Biopsy;  Surgeon: Scout Robins MD;  Location: [facility] HEART CARDIAC CATH LAB     CV HEART BIOPSY N/A 5/20/2021    Procedure: Heart Biopsy;  Surgeon: Jeffrey Gibson MD;  Location: [facility] HEART CARD[facility]AC CATH LAB     CV HEART BIOPSY N/A 5/27/2021    Procedure: Heart Biopsy;  Surgeon: Jac Dover MD;  Location: [facility]     CV HEART BIOPSY N/A 6/7/2021    Procedure: CV HEART BIOPSY;  Surgeon: Jeffrey Gibson MD;  Location: [facility] CATH LAB     CV HEART BIOPSY N/A 6/21/2021    Procedure: CV HEART BIOPSY;  Surgeon: Scout Robins MD;  Location: [facility]ATH LAB     CV RIGHT HEART CATH MEASUREMENTS RECORDED N/A 07/24/2019    Procedure: CV RIGHT HEART CATH;  Surgeon: Renu Sears MD;  Location: [facility]ATH [facility]     CV RIGHT HEART CATH MEASUREMENTS RECORDED N/A 08/05/2020    Procedure: CV RIGHT HEART CATH;  Surgeon: Nicola Seth MD;  Location: [facility] LAB     CV RIGHT HEART CATH MEASUREMENTS RECORDED N/A 01/07/2021    Procedure: CV RIGHT HEART CATH;  Surgeon: Jac Dover MD;  [facility]     CV RIGHT HEART CATH MEASUREMENTS RECORDED N/A 02/23/2021    Procedure: Heart Cath Right Heart Cath;  Surgeon: Jeffrey Gibson MD;  [facility]     CV RIGHT HEART CATH MEASUREMENTS RECORDED N/A 03/23/2021    Procedure: Heart Cath Right Heart  Cath. request for 3/23;  Surgeon: Jeffrey Gibson MD;  Location:  HEART CARDIAC CATH LAB     CV RIGHT HEART CATH MEASUREMENTS RECORDED N/A 5/13/2021    Procedure: Right Heart Cath;  Surgeon: Scout Robins MD;  Location:  HEART CARDIAC CATH LAB     CV RIGHT HEART CATH MEASUREMENTS RECORDED N/A 5/20/2021    Procedure: Right Heart Cath;  Surgeon: Jeffrey Gibson MD;  Location:  HEART CARDIAC CATH LAB     CV RIGHT HEART CATH MEASUREMENTS RECORDED N/A 5/27/2021    Procedure: Right Heart Cath;  Surgeon: Jac Dover MD;  Location:  HEART CARDIAC CATH LAB     CV RIGHT HEART CATH MEASUREMENTS RECORDED N/A 6/7/2021    Procedure: CV RIGHT HEART CATH;  Surgeon: Jeffrey Gibson MD;  Location:  HEART CARDIAC CATH LAB     CV RIGHT HEART CATH MEASUREMENTS RECORDED N/A 6/21/2021    Procedure: CV RIGHT HEART CATH;  Surgeon: Scout Robins MD;  Location:  HEART CARDIAC CATH LAB     GI SURGERY  2003    [place] [facility] [place]     INSERT VENTRICULAR ASSIST DEVICE LEFT (HEARTMATE II) N/A 06/19/2017    Procedure: INSERT VENTRICULAR ASSIST DEVICE LEFT (HEARTMATE II);  Median Sternotomy Heartmate II Left Ventricular Assist Device Insertion on Pump Oxygenator;  Surgeon: Ronnie Quigley MD;  Location:  OR     ORTHOPEDIC SURGERY  1994    right knee wired     PICC DOUBLE LUMEN PLACEMENT Right 09/23/2020    5FR PICC DL. Length-43cm (1cm out).     PICC INSERTION - DOUBLE LUMEN Right 05/09/2021    IK/BRACH     RELEASE CARPAL TUNNEL BILATERAL Bilateral 02/18/2021    Procedure: Bilateral carpal tunnel release;  Surgeon: Jermaine Brand MD;  Location:  OR     TRANSPLANT HEART RECIPIENT N/A 05/05/2021    Procedure: Redo median sternotomy, lysis of adhesions, heart transplant recipient, on cardiopulmonary bypass, intraoperative transesophageal echocardiogram per anesthesia, Implantable Cardioverter Defibrillator (ICD) removal;  Surgeon: Ronnie Quigley MD;  Location:  OR        EXAMINATION:     Base Eye Exam     Visual Acuity (Snellen - Linear)       Right Left    Near cc PH 20/25-2 PH 20/20-1          Tonometry (Tonopen, 7:03 PM)       Right Left    Pressure 16 15          Pupils       Pupils    Right PERRL    Left PERRL          Visual Fields (Counting fingers)       Left Right     Full Full          Extraocular Movement       Right Left     Full, Ortho Full, Ortho          Neuro/Psych     Oriented x3: Yes    Mood/Affect: Normal          Dilation     Both eyes: 1.0% Mydriacyl, 2.5% Devendra Synephrine @ 7:03 PM            Slit Lamp and Fundus Exam     External Exam       Right Left    External Normal Normal          Slit Lamp Exam       Right Left    Lids/Lashes Normal Normal    Conjunctiva/Sclera White and quiet White and quiet    Cornea Clear Clear    Anterior Chamber Deep and quiet Deep and quiet    Iris Round and reactive Round and reactive    Lens PC IOL PC IOL    Vitreous Normal Normal          Fundus Exam       Right Left    Disc Normal Normal    C/D Ratio 0.3 0.3     Macula Normal. flat Normal, flat    Vessels Normal Normal    Periphery Normal Normal                Labs/Studies/Imaging Performed  n/a     ASSESSMENT/PLAN:     Jim Willingham is a 66 year old male who admitted for CAP s/p OHT c/b by sepsis this admission consult for transient blurry vision.     #transient vision changes  Associated with symptoms of light headedness and dizziness.   Normal ocular structures with well healed intra-ocular lens each eye, normal IOP  Dry eye can cause transient blurring of vision that is mild and rapidly improves. Frequently occurs while hospitalized.  Recommendations   - Artificial tears bid both eyes   - investigate light headedness symptoms as in close association with vision changes supportive of possible blood pressure abnormalities or orthostatic abnormalities. Per primary team.    #heart transplant on immunosuppression  On ppx valcyte renal dosing  No signs or symptoms of CMV  retinitis on DFE    Ophthalmology will sign off at this time.  Follow up PRN.    It is our pleasure to participate in this patient's care and treatment. Please contact us with any further questions or concerns.      Estefany Cortes MD  Ophthalmology PGY2  [facility]  Pager: [phone]    Teaching Statement  I did not examine the patient, but was available to see the patient if needed. I have discussed the case with the resident and the assessment and plan as documented seems reasonable to me.    Amber Simental MD  Comprehensive Ophthalmology & Ocular Pathology  Department of Ophthalmology and Visual Neurosciences  [email or web address]  Pager [phone]

## 2021-07-04 NOTE — PROGRESS NOTES
Brief Endocrinology/ Diabetes Follow up Note    Assessment and plan:     Type II Diabetes  Hx of postprandial hypoglycemia    Recent Labs   Lab 07/04/21  1221 07/04/21  0736 07/04/21  0721 07/04/21  0316 07/03/21  2154 07/03/21  1758 07/03/21  1241 07/03/21  0702 07/03/21  0702 07/02/21  0713 07/02/21  0713 07/01/21  0644 07/01/21  0644 06/30/21  0339 06/30/21  0339 06/29/21  0631 06/29/21  0631   GLC  --   --  132*  --   --   --   --   --  138*  --  170*  --  185*  --  157*  --  354*   * 140*  --  168* 236* 193* 157*   < >  --    < >  --    < >  --    < >  --    < >  --     < > = values in this interval not displayed.     BGs overall better controlled however still noted postprandial BG to 190s - 230s    Plan  - Increase meal time novolog to 1 unit for every 6 g of carbs   - Continue glargine 24 units qAM  - Continue high dose sliding scale insulin (1 unit for every 50 over 140)  - glucose checks before meals, at bedtime, at 0200  - hypoglycemia protocol  - If dose of prednisone is changed during hospitalization or at time of discharge, please let endocrine team know as this will alter plan for insulin  - Diabetes educator consult placed to ensure patient has a plan for monitoring glucose and administering insulin at time of discharge (had not been taking insulin prior to admission)         Patient seen and examined with attending, Dr. Felicita Ku  PGY 5  Fellow  Division of Endocrinology    Attending tie-in note  I discussed about  the patient with endocrine fellow Dr. Ku. Agree above note and plan.       Jacque Mims MD  Staff Physician  Endocrinology and Metabolism  [facility]  License: [id number]  Pager: [phone]

## 2021-07-04 NOTE — PLAN OF CARE
Major Shift Events: A&Ox4. Pt still c/o of dizziness with change of position but denies eye blurriness today. Tremors unchanged. SR/STR, HR 90-100s. Sating >97% on RA. Adequate UO via urinal. No BM this shift. Regular diet, minimal appetite. ACHS BG checks. 1800 BG was 215 however pt had been drinking glucerna supplements throughout the afternoon so this number may be falsely high.Tramadol given x2 for head pain. Ax1 to chair, pt refusing to walk despite encouragement by RN d/t dizziness when standing, however pt has been doing leg exercises throughout the day provided by therapy.      Plan: Monitor BG. Ambulate as tolerated.      For vital signs and complete assessments, please see documentation flowsheets.

## 2021-07-04 NOTE — PLAN OF CARE
D AVSS with sat's 96% on room air. Heart regular/tachy and lung decreased in bases R>L. Voiced no c/o pain or nausea overnight and slept well between cares. Had BM two days ago and voided adequate amounts into urinal at bedside. Blood glucose was 168 at 0300 which was improved from last night since increasing Lantus dose. Voiced no c/o blurry vision to this nurse. Remains significantly weaker than previous day and continues to have noticeable tremors in extremities.   I Vital's, assessment and med's per order.   A Resting in bed with call light in reach.   P Continue to monitor and update MD with changes.

## 2021-07-05 ENCOUNTER — APPOINTMENT (OUTPATIENT)
Dept: PHYSICAL THERAPY | Facility: CLINIC | Age: 66
DRG: 871 | End: 2021-07-05
Payer: COMMERCIAL

## 2021-07-05 VITALS
DIASTOLIC BLOOD PRESSURE: 88 MMHG | HEART RATE: 97 BPM | BODY MASS INDEX: 29.64 KG/M2 | OXYGEN SATURATION: 97 % | RESPIRATION RATE: 16 BRPM | SYSTOLIC BLOOD PRESSURE: 123 MMHG | TEMPERATURE: 97.6 F | WEIGHT: 195.6 LBS | HEIGHT: 68 IN

## 2021-07-05 PROBLEM — Z94.1 HEART REPLACED BY TRANSPLANT (H): Status: ACTIVE | Noted: 2021-06-28

## 2021-07-05 LAB
ALBUMIN SERPL-MCNC: 2.3 G/DL (ref 3.4–5)
ALP SERPL-CCNC: 266 U/L (ref 40–150)
ALT SERPL W P-5'-P-CCNC: 50 U/L (ref 0–70)
ANION GAP SERPL CALCULATED.3IONS-SCNC: 5 MMOL/L (ref 3–14)
ANISOCYTOSIS BLD QL SMEAR: SLIGHT
AST SERPL W P-5'-P-CCNC: 26 U/L (ref 0–45)
BASOPHILS # BLD AUTO: 0 10E9/L (ref 0–0.2)
BASOPHILS NFR BLD AUTO: 0 %
BILIRUB DIRECT SERPL-MCNC: 0.1 MG/DL (ref 0–0.2)
BILIRUB SERPL-MCNC: 0.2 MG/DL (ref 0.2–1.3)
BUN SERPL-MCNC: 30 MG/DL (ref 7–30)
BURR CELLS BLD QL SMEAR: ABNORMAL
CALCIUM SERPL-MCNC: 8.4 MG/DL (ref 8.5–10.1)
CHLORIDE SERPL-SCNC: 105 MMOL/L (ref 94–109)
CO2 SERPL-SCNC: 22 MMOL/L (ref 20–32)
CREAT SERPL-MCNC: 2.25 MG/DL (ref 0.66–1.25)
DIFFERENTIAL METHOD BLD: ABNORMAL
EOSINOPHIL # BLD AUTO: 0 10E9/L (ref 0–0.7)
EOSINOPHIL NFR BLD AUTO: 0.9 %
ERYTHROCYTE [DISTWIDTH] IN BLOOD BY AUTOMATED COUNT: 15.4 % (ref 10–15)
GFR SERPL CREATININE-BSD FRML MDRD: 30 ML/MIN/{1.73_M2}
GLUCOSE BLDC GLUCOMTR-MCNC: 103 MG/DL (ref 70–99)
GLUCOSE BLDC GLUCOMTR-MCNC: 134 MG/DL (ref 70–99)
GLUCOSE SERPL-MCNC: 114 MG/DL (ref 70–99)
HCT VFR BLD AUTO: 27.2 % (ref 40–53)
HGB BLD-MCNC: 8.3 G/DL (ref 13.3–17.7)
HGB BLD-MCNC: 9 G/DL (ref 13.3–17.7)
HGB BLD-MCNC: 9 G/DL (ref 13.3–17.7)
LYMPHOCYTES # BLD AUTO: 0.8 10E9/L (ref 0.8–5.3)
LYMPHOCYTES NFR BLD AUTO: 21.2 %
MAGNESIUM SERPL-MCNC: 1.8 MG/DL (ref 1.6–2.3)
MCH RBC QN AUTO: 28.5 PG (ref 26.5–33)
MCHC RBC AUTO-ENTMCNC: 30.5 G/DL (ref 31.5–36.5)
MCV RBC AUTO: 94 FL (ref 78–100)
MONOCYTES # BLD AUTO: 0.5 10E9/L (ref 0–1.3)
MONOCYTES NFR BLD AUTO: 11.5 %
MYELOCYTES # BLD: 0 10E9/L
MYELOCYTES NFR BLD MANUAL: 0.9 %
NEUTROPHILS # BLD AUTO: 2.6 10E9/L (ref 1.6–8.3)
NEUTROPHILS NFR BLD AUTO: 65.5 %
OXYHGB MFR BLDV: 59 %
OXYHGB MFR BLDV: 59 %
PLATELET # BLD AUTO: 191 10E9/L (ref 150–450)
POIKILOCYTOSIS BLD QL SMEAR: ABNORMAL
POTASSIUM SERPL-SCNC: 4.8 MMOL/L (ref 3.4–5.3)
PROT SERPL-MCNC: 5.3 G/DL (ref 6.8–8.8)
RBC # BLD AUTO: 2.91 10E12/L (ref 4.4–5.9)
RBC INCLUSIONS BLD: SLIGHT
SODIUM SERPL-SCNC: 132 MMOL/L (ref 133–144)
TACROLIMUS BLD-MCNC: 10.7 UG/L (ref 5–15)
TME LAST DOSE: NORMAL H
WBC # BLD AUTO: 4 10E9/L (ref 4–11)

## 2021-07-05 PROCEDURE — 272N000002 HC OR SUPPLY OTHER OPNP: Performed by: INTERNAL MEDICINE

## 2021-07-05 PROCEDURE — 02BK3ZX EXCISION OF RIGHT VENTRICLE, PERCUTANEOUS APPROACH, DIAGNOSTIC: ICD-10-PCS | Performed by: INTERNAL MEDICINE

## 2021-07-05 PROCEDURE — 250N000009 HC RX 250: Performed by: INTERNAL MEDICINE

## 2021-07-05 PROCEDURE — 250N000012 HC RX MED GY IP 250 OP 636 PS 637: Performed by: STUDENT IN AN ORGANIZED HEALTH CARE EDUCATION/TRAINING PROGRAM

## 2021-07-05 PROCEDURE — 36415 COLL VENOUS BLD VENIPUNCTURE: CPT | Performed by: INTERNAL MEDICINE

## 2021-07-05 PROCEDURE — 97116 GAIT TRAINING THERAPY: CPT | Mod: GP

## 2021-07-05 PROCEDURE — 85025 COMPLETE CBC W/AUTO DIFF WBC: CPT | Performed by: INTERNAL MEDICINE

## 2021-07-05 PROCEDURE — 80197 ASSAY OF TACROLIMUS: CPT | Performed by: INTERNAL MEDICINE

## 2021-07-05 PROCEDURE — 250N000013 HC RX MED GY IP 250 OP 250 PS 637: Performed by: STUDENT IN AN ORGANIZED HEALTH CARE EDUCATION/TRAINING PROGRAM

## 2021-07-05 PROCEDURE — 99239 HOSP IP/OBS DSCHRG MGMT >30: CPT | Mod: 25 | Performed by: INTERNAL MEDICINE

## 2021-07-05 PROCEDURE — 80048 BASIC METABOLIC PNL TOTAL CA: CPT | Performed by: INTERNAL MEDICINE

## 2021-07-05 PROCEDURE — 999N001017 HC STATISTIC GLUCOSE BY METER IP

## 2021-07-05 PROCEDURE — C1894 INTRO/SHEATH, NON-LASER: HCPCS | Performed by: INTERNAL MEDICINE

## 2021-07-05 PROCEDURE — 94640 AIRWAY INHALATION TREATMENT: CPT

## 2021-07-05 PROCEDURE — 97530 THERAPEUTIC ACTIVITIES: CPT | Mod: GP

## 2021-07-05 PROCEDURE — 80197 ASSAY OF TACROLIMUS: CPT | Performed by: STUDENT IN AN ORGANIZED HEALTH CARE EDUCATION/TRAINING PROGRAM

## 2021-07-05 PROCEDURE — 80076 HEPATIC FUNCTION PANEL: CPT | Performed by: INTERNAL MEDICINE

## 2021-07-05 PROCEDURE — 250N000013 HC RX MED GY IP 250 OP 250 PS 637: Performed by: NURSE PRACTITIONER

## 2021-07-05 PROCEDURE — 99233 SBSQ HOSP IP/OBS HIGH 50: CPT | Performed by: CLINICAL NURSE SPECIALIST

## 2021-07-05 PROCEDURE — 88346 IMFLUOR 1ST 1ANTB STAIN PX: CPT | Mod: TC | Performed by: INTERNAL MEDICINE

## 2021-07-05 PROCEDURE — 999N000157 HC STATISTIC RCP TIME EA 10 MIN

## 2021-07-05 PROCEDURE — 85018 HEMOGLOBIN: CPT

## 2021-07-05 PROCEDURE — 250N000013 HC RX MED GY IP 250 OP 250 PS 637: Performed by: INTERNAL MEDICINE

## 2021-07-05 PROCEDURE — 88350 IMFLUOR EA ADDL 1ANTB STN PX: CPT | Mod: 26 | Performed by: PATHOLOGY

## 2021-07-05 PROCEDURE — 82810 BLOOD GASES O2 SAT ONLY: CPT

## 2021-07-05 PROCEDURE — 93505 ENDOMYOCARDIAL BIOPSY: CPT | Performed by: INTERNAL MEDICINE

## 2021-07-05 PROCEDURE — 88346 IMFLUOR 1ST 1ANTB STAIN PX: CPT | Mod: 26 | Performed by: PATHOLOGY

## 2021-07-05 PROCEDURE — 250N000009 HC RX 250: Performed by: STUDENT IN AN ORGANIZED HEALTH CARE EDUCATION/TRAINING PROGRAM

## 2021-07-05 PROCEDURE — 250N000011 HC RX IP 250 OP 636: Performed by: NURSE PRACTITIONER

## 2021-07-05 PROCEDURE — 88307 TISSUE EXAM BY PATHOLOGIST: CPT | Mod: 26 | Performed by: PATHOLOGY

## 2021-07-05 PROCEDURE — 272N000001 HC OR GENERAL SUPPLY STERILE: Performed by: INTERNAL MEDICINE

## 2021-07-05 PROCEDURE — 88307 TISSUE EXAM BY PATHOLOGIST: CPT | Mod: TC | Performed by: INTERNAL MEDICINE

## 2021-07-05 PROCEDURE — 4A023N6 MEASUREMENT OF CARDIAC SAMPLING AND PRESSURE, RIGHT HEART, PERCUTANEOUS APPROACH: ICD-10-PCS | Performed by: INTERNAL MEDICINE

## 2021-07-05 PROCEDURE — 83735 ASSAY OF MAGNESIUM: CPT | Performed by: INTERNAL MEDICINE

## 2021-07-05 PROCEDURE — 88350 IMFLUOR EA ADDL 1ANTB STN PX: CPT | Mod: TC | Performed by: INTERNAL MEDICINE

## 2021-07-05 RX ORDER — GABAPENTIN 300 MG/1
300 CAPSULE ORAL 2 TIMES DAILY
Qty: 60 CAPSULE | Refills: 1 | Status: SHIPPED | OUTPATIENT
Start: 2021-07-05 | End: 2021-09-28

## 2021-07-05 RX ORDER — SULFAMETHOXAZOLE AND TRIMETHOPRIM 400; 80 MG/1; MG/1
1 TABLET ORAL DAILY
Qty: 90 TABLET | Refills: 3 | Status: ON HOLD | OUTPATIENT
Start: 2021-07-05 | End: 2021-07-08

## 2021-07-05 RX ORDER — TACROLIMUS 1 MG/1
CAPSULE ORAL
Qty: 810 CAPSULE | Refills: 1 | COMMUNITY
Start: 2021-07-05 | End: 2021-07-08

## 2021-07-05 RX ORDER — BUMETANIDE 1 MG/1
TABLET ORAL
Qty: 90 TABLET | Refills: 11 | Status: ON HOLD | COMMUNITY
Start: 2021-07-05 | End: 2021-07-19

## 2021-07-05 RX ORDER — PREDNISONE 10 MG/1
20 TABLET ORAL EVERY MORNING
Qty: 120 TABLET | Refills: 1 | Status: SHIPPED | OUTPATIENT
Start: 2021-07-05 | End: 2021-07-08

## 2021-07-05 RX ORDER — BUMETANIDE 1 MG/1
1 TABLET ORAL DAILY
Status: DISCONTINUED | OUTPATIENT
Start: 2021-07-05 | End: 2021-07-05 | Stop reason: HOSPADM

## 2021-07-05 RX ORDER — TACROLIMUS 0.5 MG/1
CAPSULE ORAL
Qty: 30 CAPSULE | Refills: 3 | Status: SHIPPED | OUTPATIENT
Start: 2021-07-05 | End: 2021-07-08

## 2021-07-05 RX ORDER — POTASSIUM CHLORIDE 1500 MG/1
20 TABLET, EXTENDED RELEASE ORAL DAILY
Qty: 60 TABLET | Refills: 1 | COMMUNITY
Start: 2021-07-05 | End: 2021-07-05

## 2021-07-05 RX ORDER — INSULIN LISPRO 100 [IU]/ML
INJECTION, SOLUTION INTRAVENOUS; SUBCUTANEOUS
Qty: 3 ML | Refills: 3 | Status: ON HOLD | COMMUNITY
Start: 2021-07-05 | End: 2021-09-02

## 2021-07-05 RX ORDER — BUMETANIDE 0.25 MG/ML
2 INJECTION INTRAMUSCULAR; INTRAVENOUS ONCE
Status: COMPLETED | OUTPATIENT
Start: 2021-07-05 | End: 2021-07-05

## 2021-07-05 RX ORDER — BUMETANIDE 2 MG/1
2 TABLET ORAL DAILY
Status: DISCONTINUED | OUTPATIENT
Start: 2021-07-06 | End: 2021-07-05 | Stop reason: HOSPADM

## 2021-07-05 RX ORDER — VALGANCICLOVIR 450 MG/1
450 TABLET, FILM COATED ORAL EVERY OTHER DAY
Qty: 90 TABLET | Refills: 1 | Status: ON HOLD | COMMUNITY
Start: 2021-07-05 | End: 2021-07-08

## 2021-07-05 RX ORDER — ACETAMINOPHEN 325 MG/1
975 TABLET ORAL EVERY 6 HOURS PRN
Qty: 100 TABLET | Refills: 1 | Status: ON HOLD | OUTPATIENT
Start: 2021-07-05 | End: 2024-01-01

## 2021-07-05 RX ADMIN — BUMETANIDE 1 MG: 1 TABLET ORAL at 14:39

## 2021-07-05 RX ADMIN — GABAPENTIN 300 MG: 300 CAPSULE ORAL at 07:47

## 2021-07-05 RX ADMIN — GABAPENTIN 300 MG: 300 CAPSULE ORAL at 11:59

## 2021-07-05 RX ADMIN — POLYETHYLENE GLYCOL 3350 17 G: 17 POWDER, FOR SOLUTION ORAL at 07:48

## 2021-07-05 RX ADMIN — Medication 1 TABLET: at 07:46

## 2021-07-05 RX ADMIN — NYSTATIN 1000000 UNITS: 500000 SUSPENSION ORAL at 17:16

## 2021-07-05 RX ADMIN — SULFAMETHOXAZOLE AND TRIMETHOPRIM 1 TABLET: 400; 80 TABLET ORAL at 07:47

## 2021-07-05 RX ADMIN — TACROLIMUS 4.5 MG: 1 CAPSULE ORAL at 17:13

## 2021-07-05 RX ADMIN — UMECLIDINIUM 1 PUFF: 62.5 AEROSOL, POWDER ORAL at 07:49

## 2021-07-05 RX ADMIN — PANTOPRAZOLE SODIUM 40 MG: 40 TABLET, DELAYED RELEASE ORAL at 07:47

## 2021-07-05 RX ADMIN — BUMETANIDE 2 MG: 0.25 INJECTION, SOLUTION INTRAMUSCULAR; INTRAVENOUS at 11:58

## 2021-07-05 RX ADMIN — BUPROPION HYDROCHLORIDE 75 MG: 75 TABLET, FILM COATED ORAL at 07:47

## 2021-07-05 RX ADMIN — PREDNISONE 20 MG: 20 TABLET ORAL at 07:47

## 2021-07-05 RX ADMIN — LEVOFLOXACIN 750 MG: 750 TABLET, FILM COATED ORAL at 09:02

## 2021-07-05 RX ADMIN — ALLOPURINOL 300 MG: 300 TABLET ORAL at 07:47

## 2021-07-05 RX ADMIN — VALGANCICLOVIR 450 MG: 450 TABLET, FILM COATED ORAL at 09:02

## 2021-07-05 RX ADMIN — ASPIRIN 81 MG CHEWABLE TABLET 81 MG: 81 TABLET CHEWABLE at 07:46

## 2021-07-05 RX ADMIN — MINERAL OIL AND PETROLATUM: 150; 830 OINTMENT OPHTHALMIC at 00:16

## 2021-07-05 RX ADMIN — NYSTATIN 1000000 UNITS: 500000 SUSPENSION ORAL at 07:49

## 2021-07-05 RX ADMIN — MYCOPHENOLATE MOFETIL 1000 MG: 250 CAPSULE ORAL at 07:46

## 2021-07-05 RX ADMIN — ALBUTEROL SULFATE 2.5 MG: 2.5 SOLUTION RESPIRATORY (INHALATION) at 08:38

## 2021-07-05 RX ADMIN — FLUTICASONE FUROATE AND VILANTEROL TRIFENATATE 1 PUFF: 100; 25 POWDER RESPIRATORY (INHALATION) at 07:49

## 2021-07-05 RX ADMIN — ACETAMINOPHEN 975 MG: 325 TABLET, FILM COATED ORAL at 12:03

## 2021-07-05 RX ADMIN — ROSUVASTATIN CALCIUM 10 MG: 5 TABLET, FILM COATED ORAL at 07:46

## 2021-07-05 RX ADMIN — NYSTATIN 1000000 UNITS: 500000 SUSPENSION ORAL at 11:59

## 2021-07-05 RX ADMIN — ACETAMINOPHEN 975 MG: 325 TABLET, FILM COATED ORAL at 06:07

## 2021-07-05 RX ADMIN — TACROLIMUS 5 MG: 5 CAPSULE ORAL at 07:46

## 2021-07-05 ASSESSMENT — MIFFLIN-ST. JEOR: SCORE: 1651.74

## 2021-07-05 NOTE — DISCHARGE SUMMARY
[facility]   Cardiology II Service / Advanced Heart Failure  Discharge Summary     Jim Willingham MRN# [id number]   YOB: 1955 Age: 66 year old     DATE OF ADMISSION:  6/28/2021  DATE OF DISCHARGE: 7/5/2021  ADMITTING PROVIDER: Rose Conte MD  DISCHARGE PROVIDER: Nicola Seth MD and Kiesha Wilder DNP, ANP-C   PRIMARY PROVIDER:  Ricardo Gómez    ADMIT DIAGNOSES:   1. Sepsis likely secondary to CAP  2. Moderate pleural effusion, history of prior right pleural air leak with prolonged chest tube  3. S/p orthotopic heart transplant, chronic immunusuppression  4. Sinus tachycardia  5. CKD stage III  6. DMII  7. Acute anemia and thrombocytopenia    DISCHARGE DIAGNOSES:   1. Sepsis 2/2 CAP  2. Moderate pleural effusion s/p thoracentesis   3. S/p orthotopic heart transplant, chronic immunusuppression  4. CKD stage III  5. DMII and steroid induced hyperglycemia  6. Blurry vision with vision changes, HA, dizziness, tremor - improved, ?2/2 cefepime +/- orthostasis  7. Hyponatremia in the setting of hyperglycemia and hypovolemia  8. Anemia and thrombocytopenia, improved, multifactorial   9. Depression  10. Non severe malnutrition  11. Occipital HA ?occipital neuralgia, improved    FOLLOW-UP:  [] tacrolimus level and BMP Wednesday  [] transplant clinic visit Wednesday for fluid assessment, tacro monitoring  [] outpatient endocrine, appt requested via discharge navigator by diabetes NP  [] if renal function improves, may need to adjust Valcyte, Bactrim, gabapentin dosing  [] follow-up chest CT in 2-3 weeks (ordered)    PENDING RESULTS:   [] heart biopsy 7/5  [] final blood cultures from 6/30    HPI: Please see the detailed H & P by Jadon Lawrence and Dg from 6/28/2021. Briefly, Jim Willingham is a 66 year old male with history of NICM s/p OHT (5/6/21) postoperative course was complicated by right pleural air leak with prolonged chest tube, COPD, CKDIII, DMII, anemia who presents with SOB,  cough, fevers/chills, fatigue.     Patient reports approximately two days of nonproductive cough, rhinorrhea, fevers and chills, progressive dyspnea now at rest, generalized fatigue, poor p.o. intake, anorexia and intermittent dizziness and generalized dull headache.  Reports recent sick contacts of wife and one other family member at home who had a upper respiratory illness.  Prior to the last 2 days he has felt well overall, working more with PT and increasing his activity level.  He reports that his weight is been around 190 pounds which is lower than usual for him.  He denies any recent travel, night sweats, denies nausea or vomiting, diarrhea or abdominal pain, notes chronic anosmia, denies dysuria, denies wounds, rashes, lesions, joint pain.    HOSPITAL COURSE:   # Sepsis secondary to bacterial RLL CAP  # Chronic immunosupression  # Moderate pleural effusion  # Prior right pleural air leak s/p prolonged chest tube  # 1 of 2 blood cultures with staph epi 2/2 contaminant  P/w two days of non-productive cough, SOB, fevers/chills, fatigue with + sick contacts at home (wife with URI). Febrile (102.7), tachycardic (120s-150s), new/worse RLL consolidation with moderate effusion. Pan CT unrevealing for alternative source. Low suspicion for COPD exacerbation. COVID negative. Fungal unlikely given acuity. RVP negative.   - transplant ID consulted                - cefepime started 6/28, switched to levaquin 7/3, ended 7/4 (7d course)                  - sp 3d azithromycin 6/29-7/1                - sp vancomycin 6/29-7/1 (48 hr after negative blood cx)  - pulmonary consulted                - diagnostic and therapeutic thoracentesis 6/29, 270mL removed                - pleural cx negaive  - blood cultures 1 of 2 bottles 6/28 with staph epi, suspect contaminant  - repeat blood cultures 6/30 pending, ngtd  - pleural fluid cx pending, ngtd  - strep Ag negative  - histo and blasto negative  - aspergillus negative  - fungitell  negative and fungal blood cx negative  - MRSA nares negative  - EBV/CMV negative  - repeat chest CT in 2-3 weeks per ID to assure no e/o fungal PNA (~7/14-7/21)     # Status post OHT on 5/6/21, history of NICM and HMII LVAD  # Chronic immunosuppression  # Sinus tachycardia, exacerbated in the setting of sepsis   * TTE 6/7/21: EF 65-60%, RV normal, IVC normal  * RHC 6/21/21: RA 9 PA 32/20(25) PCWP 13 Kee CO/CI 6.1/3.1  * Angiogram deferred due to renal dysfunction  * RHC 7/5/21: RA 18 PA 45/30(35) PCWP 28 Td CI 2/7 Kee CI 3     Graft Function:   --Volume: fluid up in the setting of IVF and holding Bumex, resumed Bumex 2 mg in AM 1 mg in PM, close follow-up in clinic  --BP: wnl  --HR: 90s-100s     Immunosuppression:   --prednisone 20 mg daily, taper per protocol   --Cellcept 1000 mg BID (decreased 6/21)  --tacrolimus 5 mg in AM 4.5 mg in PM (adjusted 7/4), repeat level 7/7  (goal ~10)     Serostatus: CMV: D+/R+, EBV: D+/R+, Toxo: D-/R-     Prophylaxis:   --CAV: aspirin 81 mg, rosuvastatin 10 mg daily  --Thrush: Nystatin   --PCP: Bactrim single strength EOD (renally dosed)  --GI: Protonix bid  --Osteoporosis: calcium/vitamin D   --CMV: Valcyte 450 mg EOD x 3 mos renally dosed     # Blurry vision, vision changes  # Mild frontal HA, improved  # Dizziness +orthostatic  # Tremor 2/2 medications  Blurry vision started 7/2, worsened AM of 7/3. New vision changes bilaterally, no eye pain. Also reporting vague HA, worsening tremors, and dizziness. Neuro exam non focal. Tremors mild on exam. Blood sugars mostly within goal. Optho consulted to r/o elevated IOP, CMV retinitis, etc. Normal exam, recommended artificial tears. Symptoms possibly due to cefepime so changed to levaquin. Tacrolimus level 10-12. Symptoms resolved by 7/4 PM.      # Hyponatremia, in the setting of hyperglycemia, due to hypovolemia then hypervolemia  Na 125 on admission from [facility] last check, improved to 129 with IVF. Also hyperglycemia BS 300s so  "corrected, Na 133. Na 132 on discharge      # CKD stage III  Cr 1.85 on admission from recent baseline ~2. Cr 2.2 on day of discharge. Should improve with resumption of Bumex.      # DMII  Recent BS 300s on BMPs, 487 on admission, now improved < 200. Endocrine consulted and managed. Lantus increased to 24 units, he is to decrease to 20 units when prednisone decreases this week. novolog 4 units with Glucerna and 8 units with meals in addition to ssi. Instructions provided on AVS.     # Anemia   # Thrombocytopenia, resolved  Hgb stable, plts WNL, no signs or symptoms of active bleeding. Multifactorial.      # Depression  Continued PTA bupropion and amitriptyline      # Non severe malnutrition  Nutrition consulted      # Occipital headache, resolved  No neuro symptoms, normal exam. Suspect 2/2 muscle tension. Continue prn apap, heat.    Kiesha Wilder DNP, ANP-C  7/5/2021  Pager: [phone]    PHYSICAL EXAM:  Blood pressure (!) 121/93, pulse 101, temperature 97.7  F (36.5  C), temperature source Oral, resp. rate 18, height 1.727 m (5' 8\"), weight 88.7 kg (195 lb 9.6 oz), SpO2 95 %.    GENERAL: Appears comfortable, in no distress.  HEENT: Eye symmetrical and without discharge or icterus bilaterally. Mucous membranes moist and without lesions.  NECK: Supple, JVD midneck at 90 degrees  CV: Tachycardic and regular, +S1S2, no murmur, rub, or gallop.   RESPIRATORY: Respirations regular, even, and unlabored. Lungs CTA throughout.   GI: Soft and mildly distended with normoactive bowel sounds present in all quadrants. No tenderness, rebound, guarding.   EXTREMITIES: No peripheral edema. 2+ bilateral pedal pulses.   NEUROLOGIC: Alert and orientated x 3. No focal deficits.   MUSCULOSKELETAL: No joint swelling or tenderness.   SKIN: No jaundice. No rashes or lesions.     LABS:   Last CBC:   Recent Labs   Lab Test 07/05/21  1055 07/05/21  0642 07/05/21  0642   WBC  --   --  4.0   RBC  --   --  2.91*   HGB 9.0*   < > 8.3*   HCT  --   " --  27.2*   MCV  --   --  94   MCH  --   --  28.5   MCHC  --   --  30.5*   RDW  --   --  15.4*   PLT  --   --  191    < > = values in this interval not displayed.       Last CMP:  Recent Labs   Lab Test 07/05/21  0642   *   POTASSIUM 4.8   CHLORIDE 105   QAMAR 8.4*   CO2 22   BUN 30   CR 2.25*   *   AST 26   ALT 50   BILITOTAL 0.2   ALBUMIN 2.3*   PROTTOTAL 5.3*   ALKPHOS 266*       IMAGING:  Results for orders placed or performed during the hospital encounter of 06/28/21   XR Chest Port 1 View    Narrative    EXAM: CHEST SINGLE VIEW PORTABLE  LOCATION: [facility]  DATE/TIME: 6/28/2021 10:56 PM    INDICATION: Fever.  COMPARISON: 06/21/2021      Impression    IMPRESSION:   1. A small to moderate size region of patchy opacities in the right lung base, new. This is nonspecific and could represent pneumonia or atelectasis.  2. Blunting of the right costophrenic angle again noted and suggestive of a small to moderate-sized right pleural effusion.  3. No other significant interval change since the relatively recent comparison study.    CT Chest Abdomen Pelvis w/o Contrast    Narrative    EXAM: CT CHEST, ABDOMEN AND PELVIS WITHOUT CONTRAST  LOCATION: [facility]  DATE/TIME: 6/29/2021 12:18 AM    INDICATION: Sepsis.  COMPARISON: 05/26/2021.    TECHNIQUE: CT scan of the chest, abdomen, and pelvis was performed without IV contrast. Multiplanar reformats were obtained. Dose reduction techniques were used.  CONTRAST: None.    FINDINGS:    LUNGS AND PLEURA: A 3.8 x 2.6 cm patchy opacity is present in the central aspect of the right lower lobe (series 2 image 150), new. The lungs are otherwise clear. Moderate sized right pleural effusion, increased in size. No left pleural effusion.    MEDIASTINUM/AXILLAE: Prior median sternotomy. A fragment of a cardiac lead is again noted coursing from the left subclavian vein to the superior vena cava. Atherosclerotic calcification in the  aorta.    CORONARY ARTERY CALCIFICATION: Absent.    HEPATOBILIARY: A few small gallstones in the gallbladder.    SPLEEN: Unremarkable.    PANCREAS: Unremarkable.    ADRENAL GLANDS: Unremarkable.    KIDNEYS/BLADDER: Unremarkable.    BOWEL: Prior gastric surgery. The appendix is not visualized.    LYMPH NODES: Unremarkable.    PELVIC ORGANS: No acute findings.    MUSCULOSKELETAL: No acute findings.    OTHER: None.      Impression    IMPRESSION:   1. A small patchy opacity in the central aspect of the right lower lobe, new since 05/26/2021. This most likely represents pneumonia. Several additional patchy opacities that had been present within the lungs on the relatively recent comparison study   have resolved.  2. Moderate-sized right pleural effusion, increased in size since comparison study. A very small left pleural effusion that had been present on the comparison study has resolved.  3. No acute abnormality identified in the abdomen or pelvis.    US Chest Pleural Effusion Imaging    Narrative    US chest pleural effusion, 6/29/2021    INDICATION: Pleural effusion, right side. Evaluate pocket size for  thoracentesis.    COMPARISON: Same day CT.    TECHNIQUE: Grayscale sonographic evaluation of the right chest for  pleural effusion.    FINDINGS:  Moderately sized anechoic fluid collection in the right pleural space,  measuring 10 cm in depth.      Impression    IMPRESSION:  Moderately sized right-sided pleural effusion.    I have personally reviewed the examination and initial interpretation  and I agree with the findings.    JOANNE MCMAHAN MD   POC US Guide for Thorcentesis    Impression    Limited chest ultrasound was performed and demonstrated an adequate fluid collection on the right side of the chest. Doppler of the skin demonstrated an area at this site without significant vasculature. A thoracentesis at this site was subsequently performed.    Corey Delgadillo MD     XR Chest Port 1 View    Narrative    EXAM:  XR CHEST PORT 1 VIEW  6/29/2021 6:07 PM      HISTORY: Evaluate for pneumothorax post right thoracentesis    COMPARISON: CT: 6/29/2021    FINDINGS: Single view of the chest. Postoperative chest with median  sternotomy wires. Fracture of the superior most and second median  sternotomy wires, better characterized on same-day CT. Fragment of the  cardiac lead is again noted. No pneumothorax. Small right and probable  left pleural effusions. Normal cardiac silhouette. Bilateral  interstitial haziness. Visualized upper abdomen is unremarkable. No  aggressive osseous lesions.      Impression    IMPRESSION:  1. No pneumothorax.  2. Small right and probable left pleural effusion with bilateral  interstitial haziness, concerning for mild pulmonary edema  3. Fracture of the superior most and second median sternotomy wires,  better characterized on CT.    I have personally reviewed the examination and initial interpretation  and I agree with the findings.    JOANNE MCMAHAN MD   XR Chest Port 1 View    Narrative    EXAM: XR CHEST PORT 1 VIEW  7/1/2021 5:12 PM      HISTORY: acute sob after thoracentesis 6/29, currently being treated  for pulm infection    COMPARISON: X-ray 6/29/2021    FINDINGS: Single view of the chest. Postoperative chest with median  sternotomy wires. Fracture of the superior most and second median  sternotomy wires, similar to prior. Fragment of the cardiac lead is  again noted. No pneumothorax. Bilateral pleural effusions. Unchanged  cardiac mediastinal silhouette. Bilateral interstitial haziness, right  greater than left, slightly more pronounced when compared to prior.  Visualized upper abdomen is unremarkable. No aggressive osseous  lesions.      Impression    IMPRESSION:  1. Bilateral pleural effusions, right greater than left.  2. Bilateral interstitial haziness, right greater than left, likely  secondary to pulmonary edema.    I have personally reviewed the examination and initial interpretation  and I  agree with the findings.    ILYA HOBBS MD          SYSTEM ID:  [id number]   CT Head w/o Contrast    Narrative    CT HEAD W/O CONTRAST 7/3/2021 10:33 AM    Provided History: Dizziness, non-specific; new dizziness, vague HA,  vision changes, ro CVA  ICD-10:    Comparison: 2021.    Technique: Using multidetector thin collimation helical acquisition  technique, axial, coronal and sagittal CT images from the skull base  to the vertex were obtained without intravenous contrast.     Findings:    No intracranial hemorrhage, mass effect, or midline shift. The  ventricles are proportionate to the cerebral sulci. The gray to white  matter differentiation of the cerebral hemispheres is preserved. The  basal cisterns are patent.    The visualized paranasal sinuses are clear. The mastoid air cells are  clear.       Impression    Impression: No acute intracranial pathology.    I have personally reviewed the examination and initial interpretation  and I agree with the findings.    CONI MEDINA MD          SYSTEM ID:  [id number]   Echo Limited    Narrative    [id number]  [id number]  [id number]  [id number]^KAROL^ALYCIA^YANELI     [facility],[place]  Echocardiography Laboratory  [address] [place] [id number]     Name: LOIS FULLER  MRN: [id number]  : 1955  Study Date: 2021 07:07 AM  Age: 66 yrs  Gender: Male  Patient Location: [facility] ([facility]  Reason For Study: Transplant - Heart  Ordering Physician: ALYCIA ROBERSON  Performed By: Delmi Darby RDCS     BSA: 2.0 m2  Height: 68 in  Weight: 198 lb  HR: 120  BP: 102/69 mmHg  ______________________________________________________________________________  Procedure  Limited Portable Echo Adult.  ______________________________________________________________________________  Interpretation Summary  Global and regional left ventricular function is normal with an EF of 60-65%.  Global right ventricular function is normal.  Dilation of the  inferior vena cava is present with abnormal respiratory  variation in diameter.  No pericardial effusion is present.  ______________________________________________________________________________  Left Ventricle  Left ventricular size is normal. Global and regional left ventricular function  is normal with an EF of 60-65%.     Right Ventricle  Global right ventricular function is normal. Mild right ventricular dilation  is present.     Mitral Valve  The mitral valve is normal.     Aortic Valve  Aortic valve is normal in structure and function.     Tricuspid Valve  The tricuspid valve is normal.     Vessels  Dilation of the inferior vena cava is present with abnormal respiratory  variation in diameter. IVC diameter >2.1 cm collapsing <50% with sniff  suggests a high RA pressure estimated at 15 mmHg or greater.     Pericardium  No pericardial effusion is present.     Compared to Previous Study  This study was compared with the study from 21 . Biventricular function is  stable.     ______________________________________________________________________________  Doppler Measurements & Calculations  TR max sloane: 151.0 cm/sec  TR max P.1 mmHg     ______________________________________________________________________________  Report approved by: Shayne Page 2021 08:33 AM           *Note: Due to a large number of results and/or encounters for the requested time period, some results have not been displayed. A complete set of results can be found in Results Review.       PROCEDURES:  Thoracentesis, diagnostic and therapeutic  Right heart catheterization and heart biopsy    CONSULTATIONS:   Transplant infectious disease  Pulmonary  Interventional radiology  Endocrinology  Social work  Ophthalmology  Physical therapy  Occupational therapy     DISCHARGE MEDICATIONS:  Current Discharge Medication List      START taking these medications    Details   artificial tears OINT ophthalmic ointment Place 1 g into both  eyes At Bedtime  Qty: 1 g, Refills: 1    Associated Diagnoses: Dry eyes      tacrolimus (GENERIC EQUIVALENT) 0.5 MG capsule Take 5 mg in AM and 4.5 mg in PM (one 0.5 mg capsule plus four 1 mg capsules)  Qty: 30 capsule, Refills: 3    Associated Diagnoses: Transplanted heart (H)         CONTINUE these medications which have CHANGED    Details   acetaminophen (TYLENOL) 325 MG tablet Take 3 tablets (975 mg) by mouth every 6 hours as needed for other (For optimal non-opioid multimodal pain management to improve pain control.)  Qty: 100 tablet, Refills: 1    Associated Diagnoses: S/P orthotopic heart transplant (H)      amitriptyline (ELAVIL) 25 MG tablet Take 1 tablet (25 mg) by mouth At Bedtime    Associated Diagnoses: S/P orthotopic heart transplant (H)      bumetanide (BUMEX) 1 MG tablet Take 2 mg in the morning and 1 mg in the afternoons  Qty: 90 tablet, Refills: 11    Associated Diagnoses: S/P orthotopic heart transplant (H)      !! gabapentin (NEURONTIN) 300 MG capsule Take 1 capsule (300 mg) by mouth 2 times daily At 12pm and 8pm  Qty: 60 capsule, Refills: 1    Associated Diagnoses: S/P orthotopic heart transplant (H)      HUMALOG KWIKPEN 100 UNIT/ML soln Inject 4 units with Glucerna and 8 units with meals plus 1-9 units per sliding scale with meals and before bed.  Qty: 3 mL, Refills: 3      insulin glargine (LANTUS PEN) 100 UNIT/ML pen Inject 24 units daily, follow directions on AVS for timing  Qty: 15 mL, Refills: 1    Comments: If Lantus is not covered by insurance, may substitute Basaglar at same dose and frequency.    Associated Diagnoses: S/P orthotopic heart transplant (H)      predniSONE (DELTASONE) 10 MG tablet Take 2 tablets (20 mg) by mouth every morning  Qty: 120 tablet, Refills: 1    Associated Diagnoses: S/P orthotopic heart transplant (H)      sulfamethoxazole-trimethoprim (BACTRIM) 400-80 MG tablet Take 1 tablet by mouth daily  Qty: 90 tablet, Refills: 3    Associated Diagnoses: S/P orthotopic  heart transplant (H)      tacrolimus (GENERIC EQUIVALENT) 1 MG capsule Take 5 mg in the mornings and 4.5 mg in the evenings (four 1 mg capsules plus one 0.5 mg capsule)  Qty: 810 capsule, Refills: 1    Associated Diagnoses: S/P orthotopic heart transplant (H)      valGANciclovir (VALCYTE) 450 MG tablet Take 1 tablet (450 mg) by mouth every other day  Qty: 90 tablet, Refills: 1    Associated Diagnoses: S/P orthotopic heart transplant (H)       !! - Potential duplicate medications found. Please discuss with provider.      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 buPROPion (WELLBUTRIN) 100 MG tablet 0.5 tablets (50 mg) by Oral or Feeding Tube route every evening  Qty: 30 tablet, Refills: 1    Associated Diagnoses: S/P orthotopic heart transplant (H)      !! buPROPion (WELLBUTRIN) 75 MG tablet Take 1 tablet (75 mg) by mouth 2 times daily  Qty: 60 tablet, Refills: 1    Associated Diagnoses: S/P orthotopic heart transplant (H)      calcium citrate-vitamin D (CITRACAL) 315-250 MG-UNIT TABS per tablet Take 1 tablet by mouth 2 times daily  Qty: 60 tablet, Refills: 1    Associated Diagnoses: S/P orthotopic heart transplant (H)       Fluticasone-Umeclidin-Vilanterol (TRELEGY ELLIPTA) 100-62.5-25 MCG/INH oral inhaler Inhale 1 puff into the lungs daily  Qty: 1 each, Refills: 1    Associated Diagnoses: Chronic obstructive pulmonary disease, unspecified COPD type (H)      !! gabapentin (NEURONTIN) 300 MG capsule Take 1 capsule (300 mg) by mouth every morning  Qty: 30 capsule, Refills: 1    Associated Diagnoses: S/P orthotopic heart transplant (H)      Melatonin 10 MG CAPS Take 1 capsule by mouth At Bedtime      montelukast (SINGULAIR) 10 MG tablet Take 1 tablet (10 mg) by mouth At Bedtime  Qty: 90 tablet, Refills: 1    Associated Diagnoses: S/P orthotopic heart transplant (H)      mycophenolate (GENERIC EQUIVALENT) 250 MG capsule Take 4 capsules (1,000 mg) by mouth 2 times daily  Qty: 360 capsule, Refills: 1    Comments: TXP DT 5/6/2021 (Heart) TXP Dischg DT 5/31/2021 DX Heart replaced by transplant Z94.1 TX Center [facility] ([place], [place])  Associated Diagnoses: S/P orthotopic heart transplant (H)      nystatin (MYCOSTATIN) 742260 UNIT/ML suspension Swish and swallow 10 mLs (1,000,000 Units) in mouth 4 times daily  Qty: 1200 mL, Refills: 3    Associated Diagnoses: S/P orthotopic heart transplant (H)      pantoprazole (PROTONIX) 40 MG EC tablet Take 1 tablet (40 mg) by mouth 2 times daily  Qty: 60 tablet, Refills: 1    Associated Diagnoses: S/P orthotopic heart transplant (H)      polyethylene glycol (MIRALAX) 17 GM/Dose powder Take 1 capful by mouth daily as needed for constipation      rosuvastatin (CRESTOR) 10 MG tablet Take 1 tablet (10 mg) by mouth daily  Qty: 90 tablet, Refills: 1    Associated Diagnoses: S/P orthotopic heart transplant (H)      traMADol (ULTRAM) 50 MG tablet Take 0.5-1 tablets (25-50 mg) by mouth every 6 hours as needed for moderate pain  Qty: 25 tablet, Refills: 1    Associated Diagnoses: S/P orthotopic heart transplant (H)      ACE/ARB/ARNI NOT PRESCRIBED (INTENTIONAL) ACE & ARB  not prescribed due to CHOOSE REASON BEFORE SIGNING!!![id number]      blood glucose monitoring (ONE TOUCH ULTRA 2) meter device kit Use to test blood sugar four times daily or as directed.  Qty: 1 kit, Refills: 0    Associated Diagnoses: Type 2 diabetes mellitus with diabetic neuropathy, without long-term current use of insulin (H)      blood glucose VI test strip Use to test blood sugar four times daily or as directed.  Qty: 200 each, Refills: 0    Associated Diagnoses: Type 2 diabetes mellitus with diabetic neuropathy, without long-term current use of insulin (H)      Continuous Blood Gluc Transmit (DEXCOM G6 TRANSMITTER) MISC       cyanocobalamin (VITAMIN B12) 1000 MCG/ML injection Inject 1,000 mcg into the muscle every 30 days      cyclobenzaprine (FLEXERIL) 5 MG tablet Take 5 mg by mouth 3 times daily as needed for muscle spasms      insulin pen needle (32G X 4 MM) 32G X 4 MM miscellaneous Use four pen needles daily or as directed.  Qty: 110 each, Refills: 0    Associated Diagnoses: Type 2 diabetes mellitus with diabetic neuropathy, without long-term current use of insulin (H)      senna-docusate (SENOKOT-S/PERICOLACE) 8.6-50 MG tablet Take 2 tablets by mouth daily as needed for constipation  Qty: 180 tablet, Refills: 3    Associated Diagnoses: S/P orthotopic heart transplant (H)       !! - Potential duplicate medications found. Please discuss with provider.      STOP taking these medications       insulin aspart (NOVOLOG PEN) 100 UNIT/ML pen Comments:   Reason for Stopping:         insulin aspart (NOVOLOG PEN) 100 UNIT/ML pen Comments:   Reason for Stopping:         potassium chloride ER (KLOR-CON M) 20 MEQ CR tablet Comments:   Reason for Stopping:               DISCHARGE DISPOSITION: [place] [facility] will discharge to home in stable condition.     DISCHARGE INSTRUCTIONS:  Discharge Procedure Orders   CT Chest w/o contrast   Standing Status: Future Standing Exp. Date: 10/03/21     Order Specific Question Answer  Comments   Clinical Info for the Interpreting Provider sp recent oht, recent bacterial PNA, fu chest CT per ID to assure resolution/improvment    Priority Routine      Home care nursing referral   Referral Priority: Routine Referral Type: Home Health Therapies & Aides   Number of Visits Requested: 1     Physical Therapy Referral   Standing Status: Future   Referral Priority: Routine Referral Type: Rehab Therapy Physical Therapy   Number of Visits Requested: 1     Occupational Therapy Referral   Standing Status: Future   Referral Priority: Routine Referral Type: Occupational Therapy   Number of Visits Requested: 1     Reason for your hospital stay   Order Comments: Fever and shortness of breath due to bacterial lung infection     Adult [facility] Follow-up and recommended labs and tests   Order Comments: Follow up with transplant clinic as scheduled    You will need a repeat chest CT scan in about 2-3 weeks     Appointments on [place] and/or [facility] (with [facility] or [facility] provider or service). Call [phone] if you haven't heard regarding these appointments within 7 days of discharge.     Activity   Order Comments: Your activity upon discharge: activity as tolerated     Order Specific Question Answer Comments   Is discharge order? Yes      When to contact your care team   Order Comments: Call transplant coordinator if you have any of the following: temperature greater than 100, chills,  increased shortness of breath, increased cough, worsening blurry vision, headache, weakness,  or nausea, vomiting, diarrhea     Follow Up ([facility])   Order Comments: [facility]-- hospital follow up within 2 weeks.  With Dr. Mims or AFSANEH, please.    Appointments on [place] and/or [facility] (with [facility] or [facility] provider or service). Call [phone] if you haven't heard regarding these appointments within 7 days of discharge.     Diet   Order Comments: Follow this diet upon discharge: Orders  Placed This Encounter      Low sodium diet     Order Specific Question Answer Comments   Is discharge order? Yes        60 minutes spent in discharge, including >50% in counseling and coordination of care, medication review and plan of care recommended on follow up. Questions were answered, patient agrees to plan.

## 2021-07-05 NOTE — PROGRESS NOTES
Transplant Social Work Service Discharge Note      Patient Name:  Jim Willingham     Anticipated Discharge Date:  7/5/2021    Discharge Disposition:   Home Care:  resumption of [facility]      Additional Services/Equipment Arranged:  None      Persons notified of above discharge plan:  Pt and he reports he has notified his wife    Patient / Family response to discharge plan:  Pt is nervous to discharge but agreeable.     Education and resources provided by SW at discharge: role of transplant  in out patient setting and provided contact info for , availability of support groups      Plan:     Pt will discharge home with ongoing supervision from his wife. Pt appropriately coping with rehospitalization and eager to return home and continue with his home therapy program.

## 2021-07-05 NOTE — PROGRESS NOTES
Diabetes Consult Daily  Progress Note          Assessment/Plan:   Jim Willingham is a 66 year old male with history of NICM s/p OHT (5/6/21) postoperative course was complicated by right pleural air leak with prolonged chest tube, COPD, CKDIII, DMII, anemia admitted 6/28/21 with SOB, cough, fevers/chills, fatigue found to have sepsis felt secondary to CAP.      Fasting glucose in target, but post-prandial to 200s in setting of omitted aspart for supplements  - glargine 24 units every morning---> move to 1200 tomorrow, with eventual plan for HS dosing by discharge (pt's preferred schedule)  - aspart 1 unit per 6 grams carbs with meals and snacks  - aspart high resistance qAC, HS     Outpatient diabetes follow up: Rec follow up with Dr. Mims in [facility], within two weeks.  --appt requested via discharge navigator  Plan discussed with patient, bedside RN (please review insulin plan in AVS), and primary team.      Discharge Insulin Plan-  Lantus 24 units -- take it at 12:00 noon on 7/6,   Reduce your Lantus to 20 units AND move it to 4:00pm on 7/7 (because your prednisone will be tapered to 15 mg).  Finally, move the time to 8:00 pm starting 7/8    Novolog/Humalog  Take 4 units for a Glucerna  Take 8 units for a complete meal    And take Novolog/Humalog sliding scale based on blood glucose:  For Pre-Meal  - 164 give 1 unit.   For Pre-Meal  - 189 give 2 units.   For Pre-Meal  - 214 give 3 units.   For Pre-Meal  - 239 give 4 units.   For Pre-Meal  - 264 give 5 units.   For Pre-Meal  - 289 give 6 units.   For Pre-Meal  - 314 give 7 units.   For Pre-Meal  - 339 give 8 units.   For Pre-Meal BG = or > 340 give 9 units.    BEDTIME-  For  - 224 give 1 units.   For  - 249 give 2 units.   For  - 274 give 3 units.   For  - 299 give 4 units.   For  - 324 give 5 units.   For  - 349 give 6 units.   For BG = or >  Allergy serums received at [place].     Vials received below:    Vial Color Content                        Vial Size Expiration Date  Red 1:1 Trees  5 mL  4/25/2020  Red 1:1 Cat, Dog, Grass, Trees 5 mL  4/25/2020  Red 1:1 Weeds  5 mL  4/25/2020        Signature  Alicia Obrien         350 give 7 units.                Interval History:     The last 24 hours progress and nursing notes reviewed.      Drank two glucernas in the afternoon yesterday, not covered w/ aspart.  At home, Jim not used to taking prandial insulin.  Reinforced need for this.  Reviewed BG elevation r/t prednisone alone and additional signif elevation after carbs. Introduced concept fixed meal dose aspart for home.  Jim took glargine at night and prefers to return to that schedule.  Jim notes he has had word-finding trouble.    Was NPO for heart cath.  [place] okay on way down, more fatigued/weak upon return.  No appetite.  Using Glucerna and protein bars.    Per Cards,  Today's Plan:  -head CT  -optho consult  -change cefepime to levaquin given vague neuro symptoms   -consult neuro if no improvement  -continue to work with therapies, discharge when safe for home    Possible discharge to home later today and prednisone likely to decrease to 15 mg daily on Wed, following return of negative bx results.    Recent Labs   Lab 07/05/21  0752 07/05/21  0642 07/04/21  2313 07/04/21  1815 07/04/21  1221 07/04/21  0736 07/04/21  0721 07/04/21  0316 07/03/21  0702 07/03/21  0702 07/02/21  0713 07/02/21  0713 07/01/21  0644 07/01/21  0644 06/30/21  0339 06/30/21  0339   GLC  --  114*  --   --   --   --  132*  --   --  138*  --  170*  --  185*  --  157*   *  --  160* 215* 165* 140*  --  168*   < >  --    < >  --    < >  --    < >  --     < > = values in this interval not displayed.           Nutrition:     Orders Placed This Encounter      Diet      2 Gram Sodium Diet    Supplements: Glucerna        PTA Regimen:     Lantus 20 units (in the evening per patient, though med list said AM)  Novolog high resistance correction scale          Review of Systems:   See interval hx          Medications:   Prednisone 20 mg in the morning         Physical Exam:     Gen: Alert, in NAD , up in chair  HEENT: NC/AT,  hearing intact to  "conversational volume  Resp: Unlabored  Neuro: oriented x3, communicating clearly, needing some extra time, reports forgetting his thought  Psych: calm, even mood, open and engaged  BP 96/56   Pulse 108   Temp 97.7  F (36.5  C) (Oral)   Resp 14   Ht 1.727 m (5' 8\")   Wt 88.7 kg (195 lb 9.6 oz)   SpO2 95%   BMI 29.74 kg/m               Data:     Lab Results   Component Value Date    A1C 5.1 05/04/2021    A1C 6.2 01/07/2021    A1C 5.7 11/05/2020    A1C 7.5 08/05/2020    A1C 7.0 01/21/2020          No results found for: HEBMP, [id number], CREAT      CBC RESULTS:   Recent Labs   Lab Test 07/05/21  0642   WBC 4.0   RBC 2.91*   HGB 8.3*   HCT 27.2*   MCV 94   MCH 28.5   MCHC 30.5*   RDW 15.4*        Recent Labs   Lab Test 07/05/21  0642 07/04/21  0721   * 134   POTASSIUM 4.8 4.8   CHLORIDE 105 107   CO2 22 23   ANIONGAP 5 5   * 132*   BUN 30 34*   CR 2.25* 2.18*   QAMAR 8.4* 8.1*     Liver Function Studies -   Recent Labs   Lab Test 07/05/21  0642   PROTTOTAL 5.3*   ALBUMIN 2.3*   BILITOTAL 0.2   ALKPHOS 266*   AST 26   ALT 50     Lab Results   Component Value Date    INR 1.27 06/30/2021    INR 1.19 05/12/2021    INR 1.33 05/10/2021    INR 1.38 05/07/2021    INR 1.45 05/06/2021    INR 2.02 05/06/2021   I spent a total of 35 minutes bedside and on the inpatient unit managing the glycemic care of Jim Willingham. Over 50% of my time on the unit was spent counseling the patient  and/or coordinating care regarding prandial insulin addition to his familiar basal and correction scale.  See note for details.      Xiomara Machuca APRN -[id number]  To contact Endocrine Diabetes service:   From 8AM-4PM: page inpatient diabetes provider that is following the patient that day (see filed or incomplete progress notes/consult notes).  If uncertain of provider assignment: page job code [phone].  For questions or updates from 4PM-8AM: page the diabetes job code for on call fellow: [phone]    Please notify inpatient " diabetes service if changes are planned that will impact glycemia, such as to steroids, enteral feeding, parenteral feeding, dextrose fluids or procedures requiring prolonged NPO status.

## 2021-07-05 NOTE — DISCHARGE INSTRUCTIONS
Discharge Insulin Plan:  Lantus 24 units -- take at noon on 7/6  Reduce your Lantus to 20 units AND move it to 4:00pm on 7/7 (because your prednisone will likely be decreased when your biopsy comes back).   Finally, move the time to 8:00 pm starting 7/8    Novolog/Humalog  Take 4 units for a Glucerna  Take 8 units for a complete meal    And take Novolog/Humalog sliding scale based on blood glucose:  For Pre-Meal  - 164 give 1 unit.   For Pre-Meal  - 189 give 2 units.   For Pre-Meal  - 214 give 3 units.   For Pre-Meal  - 239 give 4 units.   For Pre-Meal  - 264 give 5 units.   For Pre-Meal  - 289 give 6 units.   For Pre-Meal  - 314 give 7 units.   For Pre-Meal  - 339 give 8 units.   For Pre-Meal BG = or > 340 give 9 units.    BEDTIME-  For  - 224 give 1 units.   For  - 249 give 2 units.   For  - 274 give 3 units.   For  - 299 give 4 units.   For  - 324 give 5 units.   For  - 349 give 6 units.   For BG = or > 350 give 7 units.       Appointment for follow up Diabetes care with Dr. Mims was requested for you.

## 2021-07-05 NOTE — PLAN OF CARE
Major Shift Events:    Remains a/ox4 and neuros intact. Afibrile. Blurriness has stopped. Headache more dull. NSR to ST. BP stable. RA. Coarse LS at times. Flipped to NPO this AM. GRACE WNL. Assist of 1.     Plan:   RHC today   PT/OT    For vital signs and complete assessments, please see documentation flowsheets.

## 2021-07-05 NOTE — PLAN OF CARE
Care Management Discharge Note    Discharge Date: 07/05/21     Discharge Disposition: Home Care, Home    Discharge Services: None    Discharge DME: None    Discharge Transportation: family or friend will provide    Handoff Referral Completed: Yes    Additional Information:  Patient is discharging home with resumption of [facility]-RN/PT/OT. Resumption orders are in place and intake is aware of discharge. No other discharge planning needs anticipated.     Vi Clement RNCC

## 2021-07-05 NOTE — PROGRESS NOTES
Discharged to: Home at 1835.  Belongings: Sent with patient  AVS (After Visit Summary) discussed with: patient, patient expressed an understanding of medications, appointments, and discharge summary.    Marlen Mcdermott RN

## 2021-07-06 ENCOUNTER — TELEPHONE (OUTPATIENT)
Dept: TRANSPLANT | Facility: CLINIC | Age: 66
End: 2021-07-06

## 2021-07-06 ENCOUNTER — PATIENT OUTREACH (OUTPATIENT)
Dept: CARE COORDINATION | Facility: CLINIC | Age: 66
End: 2021-07-06

## 2021-07-06 DIAGNOSIS — Z94.1 TRANSPLANTED HEART (H): Primary | ICD-10-CM

## 2021-07-06 DIAGNOSIS — Z71.89 OTHER SPECIFIED COUNSELING: ICD-10-CM

## 2021-07-06 LAB
BACTERIA SPEC CULT: NO GROWTH
BACTERIA SPEC CULT: NO GROWTH
BACTERIA SPEC CULT: NORMAL
COPATH REPORT: NORMAL
Lab: NORMAL
SPECIMEN SOURCE: NORMAL

## 2021-07-06 NOTE — PROGRESS NOTES
Clinic Care Coordination Contact      Patient has had a transplant within 6 months and should be contacted by the transplant RNCC. Will cancel/close post-hospital call rom [facility] at this time.         Christin Del Rosario MA  Connected Care Resource [facility]

## 2021-07-06 NOTE — PLAN OF CARE
Occupational Therapy Discharge Summary    Reason for therapy discharge:    Discharged to home with home therapy.    Progress towards therapy goal(s). See goals on Care Plan in [facility] electronic health record for goal details.  Goals partially met.  Barriers to achieving goals:   discharge from facility.    Therapy recommendation(s):    Continued therapy is recommended.  Rationale/Recommendations:  to increase activity tolerance and independence with ADL completion.

## 2021-07-07 ENCOUNTER — TELEPHONE (OUTPATIENT)
Dept: FAMILY MEDICINE | Facility: CLINIC | Age: 66
End: 2021-07-07

## 2021-07-07 ENCOUNTER — MEDICAL CORRESPONDENCE (OUTPATIENT)
Dept: HEALTH INFORMATION MANAGEMENT | Facility: CLINIC | Age: 66
End: 2021-07-07

## 2021-07-07 ENCOUNTER — TELEPHONE (OUTPATIENT)
Dept: TRANSPLANT | Facility: CLINIC | Age: 66
End: 2021-07-07

## 2021-07-07 DIAGNOSIS — E11.8 TYPE 2 DIABETES MELLITUS WITH COMPLICATION, WITH LONG-TERM CURRENT USE OF INSULIN (H): Primary | ICD-10-CM

## 2021-07-07 DIAGNOSIS — Z79.4 TYPE 2 DIABETES MELLITUS WITH COMPLICATION, WITH LONG-TERM CURRENT USE OF INSULIN (H): Primary | ICD-10-CM

## 2021-07-07 LAB
ANION GAP SERPL CALCULATED.3IONS-SCNC: 6 MMOL/L (ref 3–14)
ANISOCYTOSIS BLD QL SMEAR: SLIGHT
BASOPHILS # BLD AUTO: 0 10E9/L (ref 0–0.2)
BASOPHILS NFR BLD AUTO: 0 %
BUN SERPL-MCNC: 37 MG/DL (ref 7–30)
CALCIUM SERPL-MCNC: 8.3 MG/DL (ref 8.5–10.1)
CHLORIDE SERPL-SCNC: 106 MMOL/L (ref 94–109)
CO2 SERPL-SCNC: 21 MMOL/L (ref 20–32)
CREAT SERPL-MCNC: 2.62 MG/DL (ref 0.66–1.25)
DIFFERENTIAL METHOD BLD: ABNORMAL
EOSINOPHIL # BLD AUTO: 0.1 10E9/L (ref 0–0.7)
EOSINOPHIL NFR BLD AUTO: 0.9 %
ERYTHROCYTE [DISTWIDTH] IN BLOOD BY AUTOMATED COUNT: 15.4 % (ref 10–15)
GFR SERPL CREATININE-BSD FRML MDRD: 25 ML/MIN/{1.73_M2}
GLUCOSE SERPL-MCNC: 157 MG/DL (ref 70–99)
HCT VFR BLD AUTO: 30.9 % (ref 40–53)
HGB BLD-MCNC: 9.4 G/DL (ref 13.3–17.7)
LYMPHOCYTES # BLD AUTO: 1.3 10E9/L (ref 0.8–5.3)
LYMPHOCYTES NFR BLD AUTO: 24.1 %
MACROCYTES BLD QL SMEAR: PRESENT
MCH RBC QN AUTO: 28.3 PG (ref 26.5–33)
MCHC RBC AUTO-ENTMCNC: 30.4 G/DL (ref 31.5–36.5)
MCV RBC AUTO: 93 FL (ref 78–100)
METAMYELOCYTES # BLD: 0.1 10E9/L
METAMYELOCYTES NFR BLD MANUAL: 0.9 %
MONOCYTES # BLD AUTO: 0.5 10E9/L (ref 0–1.3)
MONOCYTES NFR BLD AUTO: 9.8 %
MYCOBACTERIUM SPEC CULT: NORMAL
MYELOCYTES # BLD: 0.1 10E9/L
MYELOCYTES NFR BLD MANUAL: 1.8 %
NEUTROPHILS # BLD AUTO: 3.5 10E9/L (ref 1.6–8.3)
NEUTROPHILS NFR BLD AUTO: 62.5 %
PLATELET # BLD AUTO: 276 10E9/L (ref 150–450)
PLATELET # BLD EST: ABNORMAL 10*3/UL
POIKILOCYTOSIS BLD QL SMEAR: SLIGHT
POTASSIUM SERPL-SCNC: 4.7 MMOL/L (ref 3.4–5.3)
RBC # BLD AUTO: 3.32 10E12/L (ref 4.4–5.9)
SODIUM SERPL-SCNC: 133 MMOL/L (ref 133–144)
SPECIMEN SOURCE: NORMAL
TACROLIMUS BLD-MCNC: 12.8 UG/L (ref 5–15)
TME LAST DOSE: NORMAL H
WBC # BLD AUTO: 5.6 10E9/L (ref 4–11)

## 2021-07-07 PROCEDURE — 80048 BASIC METABOLIC PNL TOTAL CA: CPT | Performed by: SURGERY

## 2021-07-07 PROCEDURE — 85025 COMPLETE CBC W/AUTO DIFF WBC: CPT | Performed by: SURGERY

## 2021-07-07 PROCEDURE — 80197 ASSAY OF TACROLIMUS: CPT | Performed by: SURGERY

## 2021-07-07 RX ORDER — LANCING DEVICE/LANCETS
KIT MISCELLANEOUS
Qty: 1 EACH | Refills: 0 | Status: SHIPPED | OUTPATIENT
Start: 2021-07-07 | End: 2022-05-24

## 2021-07-07 NOTE — TELEPHONE ENCOUNTER
.Reason for call:  Order   Order or referral being requested: new lancet device  Reason for request: lancet device is broken  Date needed: as soon as possible  Has the patient been seen by the PCP for this problem? YES    Additional comments: [facility]: [phone]    Phone number to reach patient:  Home number on file [phone] (home)    Best Time:  anytime    Can we leave a detailed message on this number?  YES    Travel screening: Negative

## 2021-07-07 NOTE — TELEPHONE ENCOUNTER
Pt was a no show to appointment so I called him to see if he was ok. Home health nurse was currently there drawing labs. Pt states he is fine, but feels weak and tired. BP stable per HH nurse. Weight 192 (was 195) at hospital. Cr is 2.6 was 2.2 before discharge. Pt is on bumex 2/1. Message sent to Dr. Montgomery for review.     Next set of appointments on 7/20. Follow up CT scheduled for that time. Home health RN will come weekly for now.

## 2021-07-08 ENCOUNTER — TELEPHONE (OUTPATIENT)
Dept: TRANSPLANT | Facility: CLINIC | Age: 66
End: 2021-07-08

## 2021-07-08 ENCOUNTER — APPOINTMENT (OUTPATIENT)
Dept: GENERAL RADIOLOGY | Facility: CLINIC | Age: 66
DRG: 186 | End: 2021-07-08
Attending: EMERGENCY MEDICINE
Payer: COMMERCIAL

## 2021-07-08 ENCOUNTER — APPOINTMENT (OUTPATIENT)
Dept: GENERAL RADIOLOGY | Facility: CLINIC | Age: 66
DRG: 186 | End: 2021-07-08
Payer: COMMERCIAL

## 2021-07-08 ENCOUNTER — HOSPITAL ENCOUNTER (INPATIENT)
Facility: CLINIC | Age: 66
LOS: 11 days | Discharge: HOME HEALTH | DRG: 186 | End: 2021-07-19
Attending: EMERGENCY MEDICINE | Admitting: INTERNAL MEDICINE
Payer: COMMERCIAL

## 2021-07-08 ENCOUNTER — TELEPHONE (OUTPATIENT)
Dept: CARE COORDINATION | Facility: CLINIC | Age: 66
End: 2021-07-08

## 2021-07-08 DIAGNOSIS — M1A.00X0 GOUTY ARTHROPATHY, CHRONIC, WITHOUT TOPHI: ICD-10-CM

## 2021-07-08 DIAGNOSIS — M62.81 GENERALIZED MUSCLE WEAKNESS: ICD-10-CM

## 2021-07-08 DIAGNOSIS — Z94.1 S/P ORTHOTOPIC HEART TRANSPLANT (H): ICD-10-CM

## 2021-07-08 DIAGNOSIS — Z94.1 HEART REPLACED BY TRANSPLANT (H): Primary | ICD-10-CM
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
## 2021-07-08 DIAGNOSIS — Z94.1 TRANSPLANTED HEART (H): ICD-10-CM

## 2021-07-08 DIAGNOSIS — Z11.52 ENCOUNTER FOR SCREENING LABORATORY TESTING FOR SEVERE ACUTE RESPIRATORY SYNDROME CORONAVIRUS 2 (SARS-COV-2): ICD-10-CM

## 2021-07-08 DIAGNOSIS — J18.9 COMMUNITY ACQUIRED PNEUMONIA, UNSPECIFIED LATERALITY: ICD-10-CM

## 2021-07-08 DIAGNOSIS — I31.39 PERICARDIAL EFFUSION: ICD-10-CM

## 2021-07-08 LAB
ALBUMIN SERPL-MCNC: 2.6 G/DL (ref 3.4–5)
ALP SERPL-CCNC: 292 U/L (ref 40–150)
ALT SERPL W P-5'-P-CCNC: 55 U/L (ref 0–70)
ANION GAP SERPL CALCULATED.3IONS-SCNC: 7 MMOL/L (ref 3–14)
ANISOCYTOSIS BLD QL SMEAR: NORMAL
ANISOCYTOSIS BLD QL SMEAR: SLIGHT
AST SERPL W P-5'-P-CCNC: ABNORMAL U/L (ref 0–45)
BASOPHILS # BLD AUTO: 0 10E9/L (ref 0–0.2)
BASOPHILS # BLD AUTO: NORMAL 10E9/L (ref 0–0.2)
BASOPHILS NFR BLD AUTO: 0 %
BASOPHILS NFR BLD AUTO: NORMAL %
BILIRUB SERPL-MCNC: 0.3 MG/DL (ref 0.2–1.3)
BUN SERPL-MCNC: 37 MG/DL (ref 7–30)
BURR CELLS BLD QL SMEAR: ABNORMAL
BURR CELLS BLD QL SMEAR: NORMAL
CALCIUM SERPL-MCNC: 7.9 MG/DL (ref 8.5–10.1)
CHLORIDE SERPL-SCNC: 104 MMOL/L (ref 94–109)
CO2 SERPL-SCNC: 22 MMOL/L (ref 20–32)
CREAT SERPL-MCNC: 2.37 MG/DL (ref 0.66–1.25)
CRP SERPL-MCNC: 87 MG/L (ref 0–8)
CRYSTALS SNV MICRO: ABNORMAL
DIFFERENTIAL METHOD BLD: ABNORMAL
DIFFERENTIAL METHOD BLD: NORMAL
EOSINOPHIL # BLD AUTO: 0 10E9/L (ref 0–0.7)
EOSINOPHIL # BLD AUTO: NORMAL 10E9/L (ref 0–0.7)
EOSINOPHIL NFR BLD AUTO: 0 %
EOSINOPHIL NFR BLD AUTO: NORMAL %
ERYTHROCYTE [DISTWIDTH] IN BLOOD BY AUTOMATED COUNT: 15.9 % (ref 10–15)
ERYTHROCYTE [DISTWIDTH] IN BLOOD BY AUTOMATED COUNT: NORMAL % (ref 10–15)
ERYTHROCYTE [SEDIMENTATION RATE] IN BLOOD BY WESTERGREN METHOD: 72 MM/H (ref 0–20)
GFR SERPL CREATININE-BSD FRML MDRD: 28 ML/MIN/{1.73_M2}
GLUCOSE BLDC GLUCOMTR-MCNC: 208 MG/DL (ref 70–99)
GLUCOSE SERPL-MCNC: 133 MG/DL (ref 70–99)
GRAM STN SPEC: NORMAL
GRAM STN SPEC: NORMAL
HCT VFR BLD AUTO: 28.9 % (ref 40–53)
HCT VFR BLD AUTO: NORMAL % (ref 40–53)
HGB BLD-MCNC: 8.9 G/DL (ref 13.3–17.7)
HGB BLD-MCNC: NORMAL G/DL (ref 13.3–17.7)
LABORATORY COMMENT REPORT: NORMAL
LACTATE BLD-SCNC: 1 MMOL/L (ref 0.7–2)
LYMPHOCYTES # BLD AUTO: 0.3 10E9/L (ref 0.8–5.3)
LYMPHOCYTES # BLD AUTO: NORMAL 10E9/L (ref 0.8–5.3)
LYMPHOCYTES NFR BLD AUTO: 4.2 %
LYMPHOCYTES NFR BLD AUTO: NORMAL %
MCH RBC QN AUTO: 29.1 PG (ref 26.5–33)
MCH RBC QN AUTO: NORMAL PG (ref 26.5–33)
MCHC RBC AUTO-ENTMCNC: 30.8 G/DL (ref 31.5–36.5)
MCHC RBC AUTO-ENTMCNC: NORMAL G/DL (ref 31.5–36.5)
MCV RBC AUTO: 94 FL (ref 78–100)
MCV RBC AUTO: NORMAL FL (ref 78–100)
METAMYELOCYTES # BLD: NORMAL 10E9/L
METAMYELOCYTES NFR BLD MANUAL: NORMAL %
MONOCYTES # BLD AUTO: 0.1 10E9/L (ref 0–1.3)
MONOCYTES # BLD AUTO: NORMAL 10E9/L (ref 0–1.3)
MONOCYTES NFR BLD AUTO: 1.7 %
MONOCYTES NFR BLD AUTO: NORMAL %
MYELOCYTES # BLD: 0.1 10E9/L
MYELOCYTES # BLD: NORMAL 10E9/L
MYELOCYTES NFR BLD MANUAL: 0.8 %
MYELOCYTES NFR BLD MANUAL: NORMAL %
NEUTROPHILS # BLD AUTO: 7.7 10E9/L (ref 1.6–8.3)
NEUTROPHILS # BLD AUTO: NORMAL 10E9/L (ref 1.6–8.3)
NEUTROPHILS NFR BLD AUTO: 92.5 %
NEUTROPHILS NFR BLD AUTO: NORMAL %
PLATELET # BLD AUTO: 281 10E9/L (ref 150–450)
PLATELET # BLD AUTO: NORMAL 10E9/L (ref 150–450)
PLATELET # BLD EST: ABNORMAL 10*3/UL
PLATELET # BLD EST: NORMAL 10*3/UL
POIKILOCYTOSIS BLD QL SMEAR: ABNORMAL
POIKILOCYTOSIS BLD QL SMEAR: NORMAL
POLYCHROMASIA BLD QL SMEAR: ABNORMAL
POLYCHROMASIA BLD QL SMEAR: NORMAL
POTASSIUM SERPL-SCNC: 4.6 MMOL/L (ref 3.4–5.3)
PROCALCITONIN SERPL-MCNC: 0.18 NG/ML
PROMYELOCYTES # BLD MANUAL: 0.1 10E9/L
PROMYELOCYTES NFR BLD MANUAL: 0.8 %
PROT SERPL-MCNC: 6.3 G/DL (ref 6.8–8.8)
RBC # BLD AUTO: 3.06 10E12/L (ref 4.4–5.9)
RBC # BLD AUTO: NORMAL 10E12/L (ref 4.4–5.9)
SARS-COV-2 RNA RESP QL NAA+PROBE: NEGATIVE
SODIUM SERPL-SCNC: 134 MMOL/L (ref 133–144)
SPECIMEN SOURCE FLD: NORMAL
SPECIMEN SOURCE SNV: ABNORMAL
SPECIMEN SOURCE: NORMAL
SPECIMEN SOURCE: NORMAL
TROPONIN I SERPL-MCNC: <0.015 UG/L (ref 0–0.04)
WBC # BLD AUTO: 8.3 10E9/L (ref 4–11)
WBC # BLD AUTO: NORMAL 10E9/L (ref 4–11)

## 2021-07-08 PROCEDURE — 83605 ASSAY OF LACTIC ACID: CPT | Performed by: EMERGENCY MEDICINE

## 2021-07-08 PROCEDURE — 250N000013 HC RX MED GY IP 250 OP 250 PS 637

## 2021-07-08 PROCEDURE — 87186 SC STD MICRODIL/AGAR DIL: CPT | Performed by: STUDENT IN AN ORGANIZED HEALTH CARE EDUCATION/TRAINING PROGRAM

## 2021-07-08 PROCEDURE — 93005 ELECTROCARDIOGRAM TRACING: CPT | Performed by: EMERGENCY MEDICINE

## 2021-07-08 PROCEDURE — 250N000009 HC RX 250

## 2021-07-08 PROCEDURE — 87075 CULTR BACTERIA EXCEPT BLOOD: CPT | Performed by: STUDENT IN AN ORGANIZED HEALTH CARE EDUCATION/TRAINING PROGRAM

## 2021-07-08 PROCEDURE — 73140 X-RAY EXAM OF FINGER(S): CPT | Mod: LT

## 2021-07-08 PROCEDURE — 73140 X-RAY EXAM OF FINGER(S): CPT | Mod: 26 | Performed by: RADIOLOGY

## 2021-07-08 PROCEDURE — 85025 COMPLETE CBC W/AUTO DIFF WBC: CPT | Performed by: EMERGENCY MEDICINE

## 2021-07-08 PROCEDURE — 84145 PROCALCITONIN (PCT): CPT | Performed by: EMERGENCY MEDICINE

## 2021-07-08 PROCEDURE — 87077 CULTURE AEROBIC IDENTIFY: CPT | Performed by: STUDENT IN AN ORGANIZED HEALTH CARE EDUCATION/TRAINING PROGRAM

## 2021-07-08 PROCEDURE — 71046 X-RAY EXAM CHEST 2 VIEWS: CPT

## 2021-07-08 PROCEDURE — 250N000012 HC RX MED GY IP 250 OP 636 PS 637

## 2021-07-08 PROCEDURE — 250N000013 HC RX MED GY IP 250 OP 250 PS 637: Performed by: STUDENT IN AN ORGANIZED HEALTH CARE EDUCATION/TRAINING PROGRAM

## 2021-07-08 PROCEDURE — 999N001017 HC STATISTIC GLUCOSE BY METER IP

## 2021-07-08 PROCEDURE — 250N000011 HC RX IP 250 OP 636

## 2021-07-08 PROCEDURE — 99285 EMERGENCY DEPT VISIT HI MDM: CPT | Performed by: EMERGENCY MEDICINE

## 2021-07-08 PROCEDURE — U0005 INFEC AGEN DETEC AMPLI PROBE: HCPCS | Performed by: EMERGENCY MEDICINE

## 2021-07-08 PROCEDURE — 87040 BLOOD CULTURE FOR BACTERIA: CPT | Performed by: EMERGENCY MEDICINE

## 2021-07-08 PROCEDURE — C9803 HOPD COVID-19 SPEC COLLECT: HCPCS | Performed by: EMERGENCY MEDICINE

## 2021-07-08 PROCEDURE — 36415 COLL VENOUS BLD VENIPUNCTURE: CPT | Performed by: EMERGENCY MEDICINE

## 2021-07-08 PROCEDURE — 84484 ASSAY OF TROPONIN QUANT: CPT | Performed by: EMERGENCY MEDICINE

## 2021-07-08 PROCEDURE — 86140 C-REACTIVE PROTEIN: CPT | Performed by: EMERGENCY MEDICINE

## 2021-07-08 PROCEDURE — 99285 EMERGENCY DEPT VISIT HI MDM: CPT | Mod: 25 | Performed by: EMERGENCY MEDICINE

## 2021-07-08 PROCEDURE — 87205 SMEAR GRAM STAIN: CPT | Performed by: STUDENT IN AN ORGANIZED HEALTH CARE EDUCATION/TRAINING PROGRAM

## 2021-07-08 PROCEDURE — 71046 X-RAY EXAM CHEST 2 VIEWS: CPT | Mod: 26 | Performed by: RADIOLOGY

## 2021-07-08 PROCEDURE — U0003 INFECTIOUS AGENT DETECTION BY NUCLEIC ACID (DNA OR RNA); SEVERE ACUTE RESPIRATORY SYNDROME CORONAVIRUS 2 (SARS-COV-2) (CORONAVIRUS DISEASE [COVID-19]), AMPLIFIED PROBE TECHNIQUE, MAKING USE OF HIGH THROUGHPUT TECHNOLOGIES AS DESCRIBED BY CMS-2020-01-R: HCPCS | Performed by: EMERGENCY MEDICINE

## 2021-07-08 PROCEDURE — 214N000001 HC R&B CCU UMMC

## 2021-07-08 PROCEDURE — 99223 1ST HOSP IP/OBS HIGH 75: CPT | Mod: 24 | Performed by: INTERNAL MEDICINE

## 2021-07-08 PROCEDURE — 85652 RBC SED RATE AUTOMATED: CPT | Performed by: EMERGENCY MEDICINE

## 2021-07-08 PROCEDURE — 87070 CULTURE OTHR SPECIMN AEROBIC: CPT | Performed by: STUDENT IN AN ORGANIZED HEALTH CARE EDUCATION/TRAINING PROGRAM

## 2021-07-08 PROCEDURE — 89060 EXAM SYNOVIAL FLUID CRYSTALS: CPT | Performed by: STUDENT IN AN ORGANIZED HEALTH CARE EDUCATION/TRAINING PROGRAM

## 2021-07-08 RX ORDER — VALGANCICLOVIR 450 MG/1
450 TABLET, FILM COATED ORAL DAILY
Status: ON HOLD | COMMUNITY
End: 2021-07-19

## 2021-07-08 RX ORDER — MYCOPHENOLATE MOFETIL 250 MG/1
1000 CAPSULE ORAL 2 TIMES DAILY
Status: DISCONTINUED | OUTPATIENT
Start: 2021-07-08 | End: 2021-07-15

## 2021-07-08 RX ORDER — NICOTINE POLACRILEX 4 MG
15-30 LOZENGE BUCCAL
Status: DISCONTINUED | OUTPATIENT
Start: 2021-07-08 | End: 2021-07-19 | Stop reason: HOSPADM

## 2021-07-08 RX ORDER — LIDOCAINE HYDROCHLORIDE 10 MG/ML
INJECTION, SOLUTION EPIDURAL; INFILTRATION; INTRACAUDAL; PERINEURAL
Status: DISCONTINUED
Start: 2021-07-08 | End: 2021-07-08 | Stop reason: HOSPADM

## 2021-07-08 RX ORDER — ALBUTEROL SULFATE 90 UG/1
2 AEROSOL, METERED RESPIRATORY (INHALATION) EVERY 4 HOURS PRN
Status: DISCONTINUED | OUTPATIENT
Start: 2021-07-08 | End: 2021-07-12

## 2021-07-08 RX ORDER — TACROLIMUS 1 MG/1
CAPSULE ORAL
Qty: 720 CAPSULE | Refills: 11 | Status: ON HOLD | OUTPATIENT
Start: 2021-07-08 | End: 2021-07-19

## 2021-07-08 RX ORDER — PANTOPRAZOLE SODIUM 40 MG/1
40 TABLET, DELAYED RELEASE ORAL 2 TIMES DAILY
Status: DISCONTINUED | OUTPATIENT
Start: 2021-07-08 | End: 2021-07-19 | Stop reason: HOSPADM

## 2021-07-08 RX ORDER — MONTELUKAST SODIUM 10 MG/1
10 TABLET ORAL AT BEDTIME
Status: DISCONTINUED | OUTPATIENT
Start: 2021-07-08 | End: 2021-07-19 | Stop reason: HOSPADM

## 2021-07-08 RX ORDER — ROSUVASTATIN CALCIUM 10 MG/1
10 TABLET, COATED ORAL DAILY
Status: DISCONTINUED | OUTPATIENT
Start: 2021-07-09 | End: 2021-07-19

## 2021-07-08 RX ORDER — ACETAMINOPHEN 325 MG/1
650 TABLET ORAL EVERY 4 HOURS PRN
Status: DISCONTINUED | OUTPATIENT
Start: 2021-07-08 | End: 2021-07-13

## 2021-07-08 RX ORDER — TACROLIMUS 5 MG/1
5 CAPSULE ORAL
Status: DISCONTINUED | OUTPATIENT
Start: 2021-07-08 | End: 2021-07-08

## 2021-07-08 RX ORDER — MAGNESIUM HYDROXIDE/ALUMINUM HYDROXICE/SIMETHICONE 120; 1200; 1200 MG/30ML; MG/30ML; MG/30ML
30 SUSPENSION ORAL EVERY 4 HOURS PRN
Status: DISCONTINUED | OUTPATIENT
Start: 2021-07-08 | End: 2021-07-19 | Stop reason: HOSPADM

## 2021-07-08 RX ORDER — GABAPENTIN 300 MG/1
300 CAPSULE ORAL EVERY MORNING
Status: DISCONTINUED | OUTPATIENT
Start: 2021-07-09 | End: 2021-07-19 | Stop reason: HOSPADM

## 2021-07-08 RX ORDER — NYSTATIN 100000/ML
1000000 SUSPENSION, ORAL (FINAL DOSE FORM) ORAL 4 TIMES DAILY
Status: DISCONTINUED | OUTPATIENT
Start: 2021-07-08 | End: 2021-07-19 | Stop reason: HOSPADM

## 2021-07-08 RX ORDER — ALLOPURINOL 300 MG/1
300 TABLET ORAL DAILY
Status: DISCONTINUED | OUTPATIENT
Start: 2021-07-09 | End: 2021-07-10

## 2021-07-08 RX ORDER — TACROLIMUS 0.5 MG/1
CAPSULE ORAL
Qty: 30 CAPSULE | Refills: 3 | Status: ON HOLD | OUTPATIENT
Start: 2021-07-08 | End: 2021-07-19

## 2021-07-08 RX ORDER — POLYETHYLENE GLYCOL 3350 17 G/17G
17 POWDER, FOR SOLUTION ORAL DAILY PRN
Status: DISCONTINUED | OUTPATIENT
Start: 2021-07-08 | End: 2021-07-12

## 2021-07-08 RX ORDER — GABAPENTIN 300 MG/1
300 CAPSULE ORAL 2 TIMES DAILY
Status: DISCONTINUED | OUTPATIENT
Start: 2021-07-08 | End: 2021-07-19 | Stop reason: HOSPADM

## 2021-07-08 RX ORDER — BUPROPION HYDROCHLORIDE 75 MG/1
75 TABLET ORAL 2 TIMES DAILY
Status: DISCONTINUED | OUTPATIENT
Start: 2021-07-08 | End: 2021-07-19 | Stop reason: HOSPADM

## 2021-07-08 RX ORDER — TACROLIMUS 5 MG/1
5 CAPSULE ORAL
Status: DISCONTINUED | OUTPATIENT
Start: 2021-07-09 | End: 2021-07-10

## 2021-07-08 RX ORDER — VALGANCICLOVIR 450 MG/1
450 TABLET, FILM COATED ORAL DAILY
Status: DISCONTINUED | OUTPATIENT
Start: 2021-07-09 | End: 2021-07-09

## 2021-07-08 RX ORDER — SULFAMETHOXAZOLE AND TRIMETHOPRIM 400; 80 MG/1; MG/1
1 TABLET ORAL EVERY OTHER DAY
Status: DISCONTINUED | OUTPATIENT
Start: 2021-07-09 | End: 2021-07-09

## 2021-07-08 RX ORDER — SULFAMETHOXAZOLE AND TRIMETHOPRIM 400; 80 MG/1; MG/1
1 TABLET ORAL EVERY OTHER DAY
Status: ON HOLD | COMMUNITY
End: 2021-07-19

## 2021-07-08 RX ORDER — PREDNISONE 10 MG/1
15 TABLET ORAL EVERY MORNING
Qty: 120 TABLET | Refills: 1 | Status: SHIPPED | OUTPATIENT
Start: 2021-07-08 | End: 2021-07-20

## 2021-07-08 RX ORDER — DEXTROSE MONOHYDRATE 25 G/50ML
25-50 INJECTION, SOLUTION INTRAVENOUS
Status: DISCONTINUED | OUTPATIENT
Start: 2021-07-08 | End: 2021-07-19 | Stop reason: HOSPADM

## 2021-07-08 RX ORDER — ASPIRIN 81 MG/1
81 TABLET, CHEWABLE ORAL DAILY
Status: DISCONTINUED | OUTPATIENT
Start: 2021-07-09 | End: 2021-07-19 | Stop reason: HOSPADM

## 2021-07-08 RX ORDER — LIDOCAINE 40 MG/G
CREAM TOPICAL
Status: DISCONTINUED | OUTPATIENT
Start: 2021-07-08 | End: 2021-07-19 | Stop reason: HOSPADM

## 2021-07-08 RX ORDER — ACETAMINOPHEN 650 MG/1
650 SUPPOSITORY RECTAL EVERY 4 HOURS PRN
Status: DISCONTINUED | OUTPATIENT
Start: 2021-07-08 | End: 2021-07-13

## 2021-07-08 RX ADMIN — MYCOPHENOLATE MOFETIL 1000 MG: 250 CAPSULE ORAL at 21:21

## 2021-07-08 RX ADMIN — TACROLIMUS 4.5 MG: 1 CAPSULE ORAL at 21:21

## 2021-07-08 RX ADMIN — GABAPENTIN 300 MG: 300 CAPSULE ORAL at 21:21

## 2021-07-08 RX ADMIN — AMITRIPTYLINE HYDROCHLORIDE 25 MG: 25 TABLET, FILM COATED ORAL at 21:21

## 2021-07-08 RX ADMIN — INSULIN GLARGINE 24 UNITS: 100 INJECTION, SOLUTION SUBCUTANEOUS at 22:23

## 2021-07-08 RX ADMIN — BUPROPION HYDROCHLORIDE 75 MG: 75 TABLET, FILM COATED ORAL at 21:21

## 2021-07-08 RX ADMIN — NYSTATIN 1000000 UNITS: 500000 SUSPENSION ORAL at 21:21

## 2021-07-08 RX ADMIN — Medication 50 MG: at 21:06

## 2021-07-08 RX ADMIN — MONTELUKAST 10 MG: 10 TABLET, FILM COATED ORAL at 21:21

## 2021-07-08 RX ADMIN — ACETAMINOPHEN 650 MG: 325 TABLET, FILM COATED ORAL at 21:06

## 2021-07-08 RX ADMIN — Medication 10 MG: at 22:23

## 2021-07-08 RX ADMIN — ENOXAPARIN SODIUM 40 MG: 40 INJECTION SUBCUTANEOUS at 21:46

## 2021-07-08 RX ADMIN — PANTOPRAZOLE SODIUM 40 MG: 40 TABLET, DELAYED RELEASE ORAL at 21:21

## 2021-07-08 RX ADMIN — MINERAL OIL AND PETROLATUM 1 G: 150; 830 OINTMENT OPHTHALMIC at 21:46

## 2021-07-08 ASSESSMENT — ENCOUNTER SYMPTOMS
APPETITE CHANGE: 1
WEAKNESS: 1
COUGH: 0
SHORTNESS OF BREATH: 0
FEVER: 0

## 2021-07-08 ASSESSMENT — MIFFLIN-ST. JEOR: SCORE: 1651.74

## 2021-07-08 NOTE — ED TRIAGE NOTES
Jim comes to the ED today for evaluation of generalized weakness.  He is having difficulty at home caring for self because he is so weak.  Recently discharged from the hospital 1 week ago after having pneumonia.

## 2021-07-08 NOTE — PROGRESS NOTES
Post Transplant Patient Social Work Assessment     Patient Name: Jim Willingham  : 1955  Age: 66 year old  MRN: [id number]  Date of transplant: 2021    Patient known to me from follow up in the transplant program.  Pt presenting to the ED for evaluation and potential TCU/ARU placement as he has sustained 3-4 falls since recent hospital discharge on 2021. Seen today to update assessment.      Presenting Information   Living Situation: Pt lives with his spouse, Cate, and adopted great granddaughter, Graham  Functional Status: Pt reports he is not at his functional baseline. Pt thinks he is weaker then when he left the hospital, earlier this week.   Cultural/Language/Spiritual Considerations: none     Support System  Primary Support Person Pt's wife, Cate   Other support: Adult children   Plan for support in immediate post-hospital period: Pt's wife has been providing ongoing supervision since pt was initially discharged following transplant, . Wife will continue to be available to provide supervision.     Health Care Directive  Decision Maker: Pt   Alternate Decision Maker: Wife-primary and sister-secondary   Health Care Directive: Copy in Chart    Mental Health/Coping:   History of Mental Health: Anxiety   History of Chemical Health: History of ETOH and Cannabis, but nothing in recent years.   Current status: Pt's anxiety had been well managed throughout prolonged hospitalization waiting for heart transplant. No CD concerns.   Coping: Pt's mood has been down as he hasn't regained his strength since returning home after transplant.   Services Needed/Recommended: Will continue to assess coping and mental health needs post-transplant, currently no further services recommended.     Financial   Income: SSD, great granddaughters SSDI, adoption assistance   Impact of transplant on income: minimal   Insurance and medication coverage: Yes  Financial concerns: None   Resources needed: None     Discharge  Plan   Patient and family discharge goal: Pt is open to placement for rehab--[facility] is assessing pt   Barriers to discharge: Medical Clearance     Education provided by SW: Social Work role inpatient setting, availability of support groups, discharge planning     Assessment and recommendations and plan:      Pt well known to writer from recent heart transplant hospitalization. Pt's wife contacted writer this morning that she was concerned about pt as he had had 3-4 falls since discharge home a few days ago. Pt being followed by [facility] but pt had not yet been seen by home therapies. Pt is agreeable to rehab placement.     Have discussed situation with [facility] admissions, in particular ARU. [facility] reviewed pt's recent hospitalization and at time of discharge therapy recommendations were for pt to discharge home.  Rehab is requesting re-eval by PT and OT to determine if pt is appropriate for ARU level of care.     Discussed situation with pt, wife and Dr. Ordonez.     Writer will continue to follow to assist with disposition.

## 2021-07-08 NOTE — H&P
[facility]    Cardiology History and Physical - Cards 2         Date of Admission:  7/8/2021    Assessment & Plan: SL    Jim Willingham is a 66 year old male with a history of NICM s/p OHT (5/6/21) postoperative course was complicated by right pleural air leak with prolonged chest tube, paroxysmal atrial fibrillation, COPD, CKDIII, DMII, anemia who presents to the hospital for generalized weakness and fatigue.    Generalized weakness 2/2 recent admission for CAP  Chronic immunosuppression  Moderate pleural effusion  Prior R pleural air leak s/p prolonged chest tube  3 day history of generalized fatigue after discharge on 7/5/21 after treatment for CAP and sepsis. With no other symptoms, weakness is likely 2/2 deconditioning, possible malnutrition. Low suspicion for infection given he is afebrile, WBC 8.3, and lack of other symptoms.   - Continue PTA Bactrim, Valganciclovir, Nystatin  - Possible thoracentesis of pleural effusion  - Await blood cultures  - PT/OT consult  - Nutrition consult    NICM s/p OCT (5/6/21)  Last biopsy (6/21/21):   - Acute cellular rejection: No rejection, Grade 0R (ISHLT 2004)   - Antibody-mediated rejection: No morphologic evidence of antibody-mediated rejection   - C3d and C4d are negative    Volume status/diuretics: hyper-euvolemic. PTA bumex 2 qAM, 1 in afternoon  Immunosuppression:   - Continue prednisone taper 15qAM now  - Continue cellcept 1000 BID  - Continue tacrolimus 5mg qAM, 4mg qPM. Level ordered (goal 10-12)     Serostatus: CMV: D+/R+, EBV: D+/R+, Toxo D-/R-  Prophylaxis: Bactrim, Nystatin and Valcyte     - Continue PTA ASA 81 mg  - Continue PTA rosuvastatin 10 mg daily    Swollen L pointer finger  Gout   Possible acute flare with one joint involvement in MCP of L pointer finger. However, with his status as a transplant patient with immunosuppression, past positive blood culture on last admission, concern for septic  arthritis.   - Consult ortho to tap the joint  - Continue PTA allopurinol     CKD Stage III  - Cr 2.37 from discharge Cr 2.25    DM II  - PTA insulin regimen     Depression  - PTA Wellbutrin 75 BID  - PTA Amitriptyline 25 at bedtime    COPD   - PTA Monetlukast, Trelegy Ellipta, Albuterol     Diet: Cardiac diet  DVT Prophylaxis: Enoxaparin (Lovenox) SQ  Russell Catheter: Not present  Code Status: Full         Disposition Plan   Expected discharge: 2 - 3 days, recommended to transitional care unit once his weakness improves.    Entered: Karthik Bentley MD 07/08/2021, 7:33 PM     The patient's care was discussed with the Attending Physician, Dr. Seth.    Karthik Bentley MD  [facility]  Please see sign in/sign out for up to date coverage information  ______________________________________________________________________    Chief Complaint   Weakness    History is obtained from the patient    History of Present Illness   Jim Willingham is a 66 year old male with a history of NICM s/p OHT (5/6/21) postoperative course was complicated by right pleural air leak with prolonged chest tube, paroxysmal atrial fibrillation, COPD, CKDIII, DMII, anemia who presents to the hospital for generalized weakness and fatigue. He was recently admitted on 6/28/21 for sepsis secondary to community-acquired pneumonia  in the [facility]. He was treated with cefepime (6/28-7/3), levaquin (7/3-7/4), azithromycin (6/29-7/1), and vancomycin (6/29-7/1) with 1/2 blood cultures with Staph. Epi 2/2 contaminant. A diagnostic and therapeutic thoracentesis was completed on 6/29 with 270ml removed. The pleural fluid was exudative and culture was negative. He was discharged home     Since discharge, he has been fatigued and weak while walking. He can walk through his hallway but has to sit down due to weakness at the end. He has fallen 3-4 times since discharge. He continues to have some lightheadedness after standing up too  fast that resolves within around 30 seconds. He has been sleeping 9 hours a night, has a poor appetite and eats only 1 meal a day.     Two days ago, he started to gradually have swelling in the MCP of his left pointer finger that acutely worsened today. He states it was similar to his past gout flares with pain to touch and movement that slightly improved with Tylenol.     He denies any fevers, chills, changes in vision, shortness of breath, chest pain, cough, swelling in extremities, nausea, vomiting, constipation, diarrhea, abdominal pain, heart palpitations.      Review of Systems    The 12 point Review of Systems is negative other than noted in the HPI.    Past Medical History    I have reviewed this patient's medical history and updated it with pertinent information if needed.   Past Medical History:   Diagnosis Date     Bariatric surgery status 2003     Benign essential hypertension 05/11/2017     Bilateral carpal tunnel syndrome 11/02/2020     Cardiomyopathy, unspecified (H) 05/08/2017     CKD (chronic kidney disease) stage 3, GFR 30-59 ml/min 05/11/2017     COPD (chronic obstructive pulmonary disease) (H) 11/02/2020     Depression 05/11/2017     Diabetes mellitus (H) 1995     Gouty arthropathy, chronic, without tophi 11/02/2020     H/O gastric bypass 05/11/2017     ICD (implantable cardioverter-defibrillator), biventricular, in situ 05/11/2017     LVAD (left ventricular assist device) present (H)      Major depression, recurrent, chronic (H) 11/02/2020     NICM (nonischemic cardiomyopathy) (H)/ EF 20% 05/11/2017    ECHO: LVEDd. 7.66 cm, Restrictive pattern , Severe mitral valve regurgitation     CECILIA (obstructive sleep apnea) 05/11/2017     Paroxysmal atrial fibrillation (H) 05/11/2017     Paroxysmal VT (H) 05/11/2017     Rotator cuff tear arthropathy of both shoulders 11/02/2020     Uncomplicated asthma      Vitamin B12 deficiency (non anemic) 05/11/2017       Past Surgical History   I have reviewed this  patient's surgical history and updated it with pertinent information if needed.  Past Surgical History:   Procedure Laterality Date     ANESTHESIA CARDIOVERSION N/A 05/11/2020    Procedure: ANESTHESIA, FOR CARDIOVERSION @1100;  Surgeon: GENERIC ANESTHESIA PROVIDER;  Location: [facility] OR     CV HEART BIOPSY N/A 5/13/2021    Procedure: Heart Biopsy;  Surgeon: Scout Robins MD;  Location: [facility] HEART CARDIAC CATH LAB     CV HEART BIOPSY N/A 5/20/2021    Procedure: Heart Biopsy;  Surgeon: Jeffrey Gibson MD;  Location: [facility]     CV HEART BIOPSY N/A 5/27/2021    Procedure: Heart Biopsy;  Surgeon: Jac Dover MD;  Location: [facility]     CV HEART BIOPSY N/A 6/7/2021    Procedure: CV HEART BIOPSY;  Surgeon: Jeffrey Gibson MD;  Location: [facility]     CV HEART BIOPSY N/A 6/21/2021    Procedure: CV HEART BIOPSY;  Surgeon: Scout Robins MD;  Location: [facility]     CV HEART BIOPSY N/A 7/5/2021    Procedure: CV HEART BIOPSY;  Surgeon: Jeffrey Gibson MD;  Location: [facility]     CV RIGHT HEART CATH MEASUREMENTS RECORDED N/A 07/24/2019    Procedure: CV RIGHT HEART CATH;  Surgeon: Renu Sears MD;  Location: [facility]     CV RIGHT HEART CATH MEASUREMENTS RECORDED N/A 08/05/2020    Procedure: CV RIGHT HEART CATH;  Surgeon: Nicola Seth MD;  Location: [facility]     CV RIGHT HEART CATH MEASUREMENTS RECORDED N/A 01/07/2021    Procedure: CV RIGHT HEART CATH;  Surgeon: Jac Dover MD;  Location: [facility]     CV RIGHT HEART CATH MEASUREMENTS RECORDED N/A 02/23/2021    Procedure: Heart Cath Right Heart Cath;  Surgeon: Jeffrey Gibson MD;  Location: [facility]     CV RIGHT HEART CATH MEASUREMENTS RECORDED N/A 03/23/2021    Procedure: Heart Cath Right Heart Cath. request for 3/23;  Surgeon:  Jeffrey Gibson MD;  Location:  HEART CARDIAC CATH LAB     CV RIGHT HEART CATH MEASUREMENTS RECORDED N/A 5/13/2021    Procedure: Right Heart Cath;  Surgeon: Scout Robins MD;  Location:  HEART CARDIAC CATH LAB     CV RIGHT HEART CATH MEASUREMENTS RECORDED N/A 5/20/2021    Procedure: Right Heart Cath;  Surgeon: Jeffrey Gibson MD;  Location:  HEART CARDIAC CATH LAB     CV RIGHT HEART CATH MEASUREMENTS RECORDED N/A 5/27/2021    Procedure: Right Heart Cath;  Surgeon: Jac Dover MD;  Location:  HEART CARDIAC CATH LAB     CV RIGHT HEART CATH MEASUREMENTS RECORDED N/A 6/7/2021    Procedure: CV RIGHT HEART CATH;  Surgeon: Jeffrey Gibson MD;  Location:  HEART CARDIAC CATH LAB     CV RIGHT HEART CATH MEASUREMENTS RECORDED N/A 6/21/2021    Procedure: CV RIGHT HEART CATH;  Surgeon: Scout Robins MD;  Location:  HEART CARDIAC CATH LAB     CV RIGHT HEART CATH MEASUREMENTS RECORDED N/A 7/5/2021    Procedure: CV RIGHT HEART CATH;  Surgeon: Jeffrey Gibson MD;  Location:  HEART CARDIAC CATH LAB     GI SURGERY  2003    [facility]     INSERT VENTRICULAR ASSIST DEVICE LEFT (HEARTMATE II) N/A 06/19/2017    Procedure: INSERT VENTRICULAR ASSIST DEVICE LEFT (HEARTMATE II);  Median Sternotomy Heartmate II Left Ventricular Assist Device Insertion on Pump Oxygenator;  Surgeon: Ronnie Quigley MD;  Location:  OR     ORTHOPEDIC SURGERY  1994    right knee wired     PICC DOUBLE LUMEN PLACEMENT Right 09/23/2020    5FR PICC DL. Length-43cm (1cm out).     PICC INSERTION - DOUBLE LUMEN Right 05/09/2021    IK/BRACH     RELEASE CARPAL TUNNEL BILATERAL Bilateral 02/18/2021    Procedure: Bilateral carpal tunnel release;  Surgeon: Jermaine Brand MD;  Location:  OR     TRANSPLANT HEART RECIPIENT N/A 05/05/2021    Procedure: Redo median sternotomy, lysis of adhesions, heart transplant recipient, on cardiopulmonary bypass, intraoperative transesophageal  echocardiogram per anesthesia, Implantable Cardioverter Defibrillator (ICD) removal;  Surgeon: Ronnie Quigley MD;  Location: [facility] OR       Social History   I have reviewed this patient's social history and updated it with pertinent information if needed.  Social History     Tobacco Use     Smoking status: Former Smoker     Quit date:      Years since quittin.5     Smokeless tobacco: Never Used   Substance Use Topics     Alcohol use: No     Drug use: No     Family History     I have reviewed this patient's family history and updated it with pertinent information if needed.  Family History   Problem Relation Age of Onset     Cerebrovascular Disease Mother 64     Diabetes Mother      Hypertension Mother      Coronary Artery Disease Father      Diabetes Type 2  Father      Obesity Brother      Obesity Brother      Cerebrovascular Disease Daughter 40       Prior to Admission Medications   Prior to Admission Medications   Prescriptions Last Dose Informant Patient Reported? Taking?   ACE/ARB/ARNI NOT PRESCRIBED (INTENTIONAL)  Self Yes No   Sig: ACE & ARB not prescribed due to Other:   Continuous Blood Gluc Transmit (DEXCOM G6 TRANSMITTER) MISC  Self Yes No   Fluticasone-Umeclidin-Vilanterol (TRELEGY ELLIPTA) 100-62.5-25 MCG/INH oral inhaler  Self No No   Sig: Inhale 1 puff into the lungs daily   HUMALOG KWIKPEN 100 UNIT/ML soln   Yes No   Sig: Inject 4 units with Glucerna and 8 units with meals plus 1-9 units per sliding scale with meals and before bed.   Melatonin 10 MG CAPS  Self Yes No   Sig: Take 1 capsule by mouth At Bedtime   acetaminophen (TYLENOL) 325 MG tablet   No No   Sig: Take 3 tablets (975 mg) by mouth every 6 hours as needed for other (For optimal non-opioid multimodal pain management to improve pain control.)   albuterol (PROAIR HFA) 108 (90 Base) MCG/ACT inhaler  Self No No   Sig: Inhale 2 puffs into the lungs every 4 hours as needed for shortness of breath / dyspnea or wheezing   allopurinol  (ZYLOPRIM) 300 MG tablet  Self No No   Sig: Take 1 tablet (300 mg) by mouth daily   amitriptyline (ELAVIL) 25 MG tablet   Yes No   Sig: Take 1 tablet (25 mg) by mouth At Bedtime   artificial tears OINT ophthalmic ointment   No No   Sig: Place 1 g into both eyes At Bedtime   aspirin (ASA) 81 MG chewable tablet  Self No No   Sig: Take 1 tablet (81 mg) by mouth daily   blood glucose (ONE TOUCH DELICA) lancing device   No No   Sig: Lancing device to be used with lancets.   blood glucose VI test strip  Self No No   Sig: Use to test blood sugar four times daily or as directed.   blood glucose monitoring (ONE TOUCH ULTRA 2) meter device kit  Self No No   Sig: Use to test blood sugar four times daily or as directed.   buPROPion (WELLBUTRIN) 75 MG tablet  Self No No   Sig: Take 1 tablet (75 mg) by mouth 2 times daily   bumetanide (BUMEX) 1 MG tablet   Yes No   Sig: Take 2 mg in the morning and 1 mg in the afternoons   calcium citrate-vitamin D (CITRACAL) 315-250 MG-UNIT TABS per tablet  Self No No   Sig: Take 1 tablet by mouth 2 times daily   cyanocobalamin (VITAMIN B12) 1000 MCG/ML injection  Self Yes No   Sig: Inject 1,000 mcg into the muscle every 30 days   cyclobenzaprine (FLEXERIL) 5 MG tablet  Self Yes No   Sig: Take 5 mg by mouth 3 times daily as needed for muscle spasms   gabapentin (NEURONTIN) 300 MG capsule  Self No No   Sig: Take 1 capsule (300 mg) by mouth every morning   gabapentin (NEURONTIN) 300 MG capsule   No No   Sig: Take 1 capsule (300 mg) by mouth 2 times daily At 12pm and 8pm   insulin glargine (LANTUS PEN) 100 UNIT/ML pen   No No   Sig: Inject 24 units daily, follow directions on AVS for timing   insulin pen needle (32G X 4 MM) 32G X 4 MM miscellaneous  Self No No   Sig: Use four pen needles daily or as directed.   montelukast (SINGULAIR) 10 MG tablet  Self No No   Sig: Take 1 tablet (10 mg) by mouth At Bedtime   mycophenolate (GENERIC EQUIVALENT) 250 MG capsule  Self No No   Sig: Take 4 capsules  (1,000 mg) by mouth 2 times daily   nystatin (MYCOSTATIN) 520580 UNIT/ML suspension  Self No No   Sig: Swish and swallow 10 mLs (1,000,000 Units) in mouth 4 times daily   pantoprazole (PROTONIX) 40 MG EC tablet  Self No No   Sig: Take 1 tablet (40 mg) by mouth 2 times daily   polyethylene glycol (MIRALAX) 17 GM/Dose powder  Self Yes No   Sig: Take 1 capful by mouth daily as needed for constipation   predniSONE (DELTASONE) 10 MG tablet   No No   Sig: Take 1.5 tablets (15 mg) by mouth every morning   rosuvastatin (CRESTOR) 10 MG tablet  Self No No   Sig: Take 1 tablet (10 mg) by mouth daily   senna-docusate (SENOKOT-S/PERICOLACE) 8.6-50 MG tablet  Self No No   Sig: Take 2 tablets by mouth daily as needed for constipation   Patient not taking: Reported on 6/29/2021   sulfamethoxazole-trimethoprim (BACTRIM) 400-80 MG tablet   No No   Sig: Take 1 tablet by mouth daily   tacrolimus (GENERIC EQUIVALENT) 0.5 MG capsule   No No   Sig: Take one capsule (0.5 mg) every am with Four 1 mg (4mg) capsules to equal 4.5 mg in am.   tacrolimus (GENERIC EQUIVALENT) 1 MG capsule   No No   Sig: Take 4 capsules (4 mg) by mouth every morning AND 4 capsules (4 mg) every evening.   traMADol (ULTRAM) 50 MG tablet  Self No No   Sig: Take 0.5-1 tablets (25-50 mg) by mouth every 6 hours as needed for moderate pain   valGANciclovir (VALCYTE) 450 MG tablet   Yes No   Sig: Take 1 tablet (450 mg) by mouth every other day      Facility-Administered Medications: None     Allergies   Allergies   Allergen Reactions     Grass Shortness Of Breath     Ace Inhibitors Cough     Dust Mites Other (See Comments)     Asthma     Mold Other (See Comments)     Asthma     Penicillins Other (See Comments)     Unknown - childhood exposure    Tolerated Zosyn 9/18-9/20/2020     Sulfa Drugs Other (See Comments) and Unknown     Unknown childhood reaction       Physical Exam   Vital Signs: Temp: 98.1  F (36.7  C) Temp src: Oral BP: 112/82 Pulse: 106   Resp: 18 SpO2: 99 %  O2 Device: None (Room air)    Weight: 195 lbs 9.6 oz    Constitutional: awake, alert, cooperative, no apparent distress, and appears stated age  Eyes: Lids and lashes normal, PERRLA, EOMI, sclera clear, conjunctiva normal  ENT: Normocephalic, without obvious abnormality, atraumatic, sinuses nontender on palpation, oral pharynx with moist mucous membranes  Hematologic / Lymphatic: no cervical lymphadenopathy  Respiratory: No increased work of breathing, good air exchange, clear to auscultation bilaterally, no crackles or wheezing  Cardiovascular: Normal apical impulse, regular rate and rhythm, normal S1 and S2, no S3 or S4, and no murmur noted  GI: Normal bowel sounds, soft, non-distended, non-tender, no masses palpated  Musculoskeletal: no lower extremity pitting edema present. There is no redness, warmth, or swelling of the joints except for the MCP joint in his left index finger that is tender to palpation and warm. Motor strength is 5 out of 5 all extremities bilaterally  Neurologic: Awake, alert, oriented to name, place and time.  Cranial nerves II-XII are grossly intact.  Motor is 5 out of 5 bilaterally.  Sensory is intact.      Data   Data reviewed today: I reviewed all medications, new labs and imaging results over the last 24 hours. I personally reviewed Cardiology specific data review: the chest x-ray image(s) showing moderate right pleural effusion      Recent Labs   Lab 07/08/21  1746 07/08/21  1533 07/07/21  0858 07/05/21  0642 07/05/21  0642   WBC 8.3 Canceled, Test credited 5.6  --  4.0   HGB 8.9* Canceled, Test credited 9.4*   < > 8.3*   MCV 94 Canceled, Test credited 93  --  94    Canceled, Test credited 276  --  191   NA  --  134 133  --  132*   POTASSIUM  --  4.6 4.7  --  4.8   CHLORIDE  --  104 106  --  105   CO2  --  22 21  --  22   BUN  --  37* 37*  --  30   CR  --  2.37* 2.62*  --  2.25*   ANIONGAP  --  7 6  --  5   QAMAR  --  7.9* 8.3*  --  8.4*   GLC  --  133* 157*  --  114*   ALBUMIN   --  2.6*  --   --  2.3*   PROTTOTAL  --  6.3*  --   --  5.3*   BILITOTAL  --  0.3  --   --  0.2   ALKPHOS  --  292*  --   --  266*   ALT  --  55  --   --  50   AST  --  Canceled, Test credited  --   --  26   TROPI  --  <0.015  --   --   --     < > = values in this interval not displayed.     Recent Results (from the past 24 hour(s))   XR Chest 2 Views    Narrative    EXAM: XR CHEST 2 VW  7/8/2021 4:08 PM     HISTORY:  weakness       COMPARISON:  Chest x-ray 7/1/2021, 6/29/2021.    FINDINGS:   AP and lateral upright chest x-ray. Postsurgical changes chest. Median  sternotomy wires with fractured top 2 wires unchanged. Abandoned left  chest wall ICD lead.    Trachea is midline. Cardiac silhouette is within normal limits.  Pulmonary vasculature is indistinct. Right greater than left mixed  interstitial and airspace opacities, increased from prior exam.  Moderate-sized right pleural effusion, increased from prior exam.  Stable small left pleural effusion. Aortic arch atherosclerotic  calcifications.     No acute osseous abnormality. Visualized soft tissues and upper  abdomen are unremarkable.       Impression    IMPRESSION:   1. Increased right greater than left mixed interstitial and airspace  opacities suggestive for pulmonary edema.  2. Moderate size right pleural effusion, increased from prior exam.   3. Stable small left pleural effusion.    I have personally reviewed the examination and initial interpretation  and I agree with the findings.    ILYA HOBBS MD         SYSTEM ID:  [id number]

## 2021-07-08 NOTE — ED PROVIDER NOTES
[place] EMERGENCY DEPARTMENT ([facility])  7/08/21     History     Chief Complaint   Patient presents with     Generalized Weakness     The history is provided by the patient and medical records.     Jim Willingham is a 66 year old male with a past medical history significant for nonischemic cardiomyopathy s/p orthotopic heart transplant (05/06/2021) c/b right pleural air leak with prolonged chest tube, paroxysmal atrial fibrillation, COPD, CKD type 3, type 2 diabetes mellitus, anemia who presents here to the Emergency Department due to generalized weakness.  Patient reports that he has been feeling weak and rundown since Monday.  He reports that this weakness has been causing him to fall.  Patient states that he has had 8 falls in the past 6 months.  He states that he fell out of bed last night, but that this occasionally happens because he has dreams where he dives and then he wakes up on the floor.  He reports that he has also had falls while he is getting out of a chair or while walking.  Patient reports that he is very wobbly when he walks.  Patient also endorses gout pain in the first knuckle of his left hand.  He reports that this started a few days ago.  He states it is very painful today and that he cannot use his left hand.  Patient reports that he has not been eating much because he has no appetite.  Patient denies fevers, chest pain, shortness of breath, cough.  Patient reports that he is on Bactrim because of his heart transplant.  He states that he was on Zosyn, but that they thought it was making him worse so they switched him to Levaquin.  Of note, patient reports that he had an LVAD for 4 years prior to his heart transplant.    Per review of patient's chart, patient was admitted here at [facility] with Cardiology from 06/28-07/05/2021 for sepsis likely secondary to community acquired pneumonia. Pan CT unrevealing for alternative source. Transplant ID was consulted and patient was started  on Cefepime (06/28), switched to Levaquin (07/03), and ended on 07/04.  He had 3 days azithromycin and 48 hours vancomycin.  He underwent diagnostic and therapeutic thoracentesis on 06/29 with 270 mL removed.      Past Medical History  Past Medical History:   Diagnosis Date     Bariatric surgery status 2003     Benign essential hypertension 05/11/2017     Bilateral carpal tunnel syndrome 11/02/2020     Cardiomyopathy, unspecified (H) 05/08/2017     CKD (chronic kidney disease) stage 3, GFR 30-59 ml/min 05/11/2017     COPD (chronic obstructive pulmonary disease) (H) 11/02/2020     Depression 05/11/2017     Diabetes mellitus (H) 1995     Gouty arthropathy, chronic, without tophi 11/02/2020     H/O gastric bypass 05/11/2017     ICD (implantable cardioverter-defibrillator), biventricular, in situ 05/11/2017     LVAD (left ventricular assist device) present (H)      Major depression, recurrent, chronic (H) 11/02/2020     NICM (nonischemic cardiomyopathy) (H)/ EF 20% 05/11/2017    ECHO: LVEDd. 7.66 cm, Restrictive pattern , Severe mitral valve regurgitation     CECILIA (obstructive sleep apnea) 05/11/2017     Paroxysmal atrial fibrillation (H) 05/11/2017     Paroxysmal VT (H) 05/11/2017     Rotator cuff tear arthropathy of both shoulders 11/02/2020     Uncomplicated asthma      Vitamin B12 deficiency (non anemic) 05/11/2017     Past Surgical History:   Procedure Laterality Date     ANESTHESIA CARDIOVERSION N/A 05/11/2020    Procedure: ANESTHESIA, FOR CARDIOVERSION @1100;  Surgeon: GENERIC ANESTHESIA PROVIDER;  Location: [facility] OR     CV HEART BIOPSY N/A 5/13/2021    Procedure: Heart Biopsy;  Surgeon: Scout Robins MD;  Location:  HEART CARDIAC CATH LAB     CV HEART BIOPSY N/A 5/20/2021    Procedure: Heart Biopsy;  Surgeon: Jeffrey Gibson MD;  Location:  HEART CARDIAC CATH LAB     CV HEART BIOPSY N/A 5/27/2021    Procedure: Heart Biopsy;  Surgeon: Jac Dover MD;  Location:  HEART CARDIAC  CATH LAB     CV HEART BIOPSY N/A 6/7/2021    Procedure: CV HEART BIOPSY;  Surgeon: Jeffrey Gibson MD;  [facility] CARD[facility] CATH LAB     CV HEART BIOPSY N/A 6/21/2021    Procedure: CV HEART BIOPSY;  Surgeon: Scout Robins MD;  [facility]: [facility] HEART CARDIAC CATH LAB     CV HEART BIOPSY N/A 7/5/2021    Procedure: CV HEART BIOPSY;  Surgeon: Jeffrey Gibson MD;  Location:  HEART CARDIAC CATH LAB     CV RIGHT HEART CATH MEASUREMENTS RECORDED N/A 07/24/2019    Procedure: CV RIGHT HEART CATH;  Surgeon: Renu Sears MD;  Location:  HEART CARDIAC CATH LAB     CV RIGHT HEART CATH MEASUREMENTS RECORDED N/A 08/05/2020    Procedure: CV RIGHT HEART CATH;  Surgeon: Nicola Seth MD;  Location:  HEART CARDIAC CATH LAB     CV RIGHT HEART CATH MEASUREMENTS RECORDED N/A 01/07/2021    Procedure: CV RIGHT HEART CATH;  Surgeon: Jca Dover MD;  Location:  HEART CARDIAC CATH LAB     CV RIGHT HEART CATH MEASUREMENTS RECORDED N/A 02/23/2021    Procedure: Heart Cath Right Heart Cath;  Surgeon: Jeffrey Gibson MD;  Location:  HEART CARDIAC CATH LAB     CV RIGHT HEART CATH MEASUREMENTS RECORDED N/A 03/23/2021    Procedure: Heart Cath Right Heart Cath. request for 3/23;  Surgeon: Jeffrey Gibson MD;  Location:  HEART CARDIAC CATH LAB     CV RIGHT HEART CATH MEASUREMENTS RECORDED N/A 5/13/2021    Procedure: Right Heart Cath;  Surgeon: Scout Robins MD;  Location:  HEART CARDIAC CATH LAB     CV RIGHT HEART CATH MEASUREMENTS RECORDED N/A 5/20/2021    Procedure: Right Heart Cath;  Surgeon: Jeffrey Gibson MD;  Location:  HEART CARDIAC CATH LAB     CV RIGHT HEART CATH MEASUREMENTS RECORDED N/A 5/27/2021    Procedure: Right Heart Cath;  Surgeon: Jac Dover MD;  Location:  HEART CARDIAC CATH LAB     CV RIGHT HEART CATH MEASUREMENTS RECORDED N/A 6/7/2021    Procedure: CV RIGHT HEART CATH;  Surgeon: Paige  Jeffrey Rogers MD;  Location:  HEART CARDIAC CATH LAB     CV RIGHT HEART CATH MEASUREMENTS RECORDED N/A 6/21/2021    Procedure: CV RIGHT HEART CATH;  Surgeon: Scout Robins MD;  Location:  HEART CARDIAC CATH LAB     CV RIGHT HEART CATH MEASUREMENTS RECORDED N/A 7/5/2021    Procedure: CV RIGHT HEART CATH;  Surgeon: Jeffrey Gibson MD;  Location:  HEART CARDIAC CATH LAB     GI SURGERY  2003    [place]     INSERT VENTRICULAR ASSIST DEVICE LEFT (HEARTMATE II) N/A 06/19/2017    Procedure: INSERT VENTRICULAR ASSIST DEVICE LEFT (HEARTMATE II);  Median Sternotomy Heartmate II Left Ventricular Assist Device Insertion on Pump Oxygenator;  Surgeon: Ronnie Quigley MD;  Location:  OR     ORTHOPEDIC SURGERY  1994    right knee wired     PICC DOUBLE LUMEN PLACEMENT Right 09/23/2020    5FR PICC DL. Length-43cm (1cm out).     PICC INSERTION - DOUBLE LUMEN Right 05/09/2021    IK/BRACH     RELEASE CARPAL TUNNEL BILATERAL Bilateral 02/18/2021    Procedure: Bilateral carpal tunnel release;  Surgeon: Jermaine Brand MD;  Location:  OR     TRANSPLANT HEART RECIPIENT N/A 05/05/2021    Procedure: Redo median sternotomy, lysis of adhesions, heart transplant recipient, on cardiopulmonary bypass, intraoperative transesophageal echocardiogram per anesthesia, Implantable Cardioverter Defibrillator (ICD) removal;  Surgeon: Ronnie Quigley MD;  Location:  OR     No current outpatient medications on file.    Allergies   Allergen Reactions     Grass Shortness Of Breath     Ace Inhibitors Cough     Dust Mites Other (See Comments)     Asthma     Mold Other (See Comments)     Asthma     Penicillins Other (See Comments)     Unknown - childhood exposure    Tolerated Zosyn 9/18-9/20/2020     Sulfa Drugs Other (See Comments) and Unknown     Unknown childhood reaction     Family History  Family History   Problem Relation Age of Onset     Cerebrovascular Disease Mother 64     Diabetes Mother      Hypertension Mother   "    Coronary Artery Disease Father      Diabetes Type 2  Father      Obesity Brother      Obesity Brother      Cerebrovascular Disease Daughter 40     Social History   Social History     Tobacco Use     Smoking status: Former Smoker     Quit date:      Years since quittin.5     Smokeless tobacco: Never Used   Substance Use Topics     Alcohol use: No     Drug use: No      Past medical history, past surgical history, medications, allergies, family history, and social history were reviewed with the patient. No additional pertinent items.       Review of Systems   Constitutional: Positive for appetite change. Negative for fever.   Respiratory: Negative for cough and shortness of breath.    Cardiovascular: Negative for chest pain.   Musculoskeletal:        Positive for left hand knuckle   Neurological: Positive for weakness.   All other systems reviewed and are negative.    A complete review of systems was performed with pertinent positives and negatives noted in the HPI, and all other systems negative.    Physical Exam   BP: 108/71  Pulse: 112  Temp: 98.1  F (36.7  C)  Resp: 18  Height: 172.7 cm (5' 8\")  Weight: 88.7 kg (195 lb 9.6 oz)  SpO2: 99 %  Physical Exam  Constitutional:       Appearance: He is well-developed.   HENT:      Head: Normocephalic and atraumatic.   Neck:      Musculoskeletal: Normal range of motion and neck supple.   Cardiovascular:      Rate and Rhythm: Normal rate and regular rhythm.      Heart sounds: Normal heart sounds.   Pulmonary:      Effort: Pulmonary effort is normal. No respiratory distress.      Breath sounds: No wheezing.      Comments: Well healing sternotomy scar  Abdominal:      General: There is no distension.      Palpations: Abdomen is soft.      Tenderness: There is no abdominal tenderness. There is no rebound.   Musculoskeletal:      Comments: Swelling in left hand index finger MCP joint;  Warm and tender to touch;  Able to flex and extend MCP joint but limited by pain  "   Skin:     General: Skin is warm.   Neurological:      Mental Status: He is alert and oriented to person, place, and time.      Cranial Nerves: No cranial nerve deficit.   Psychiatric:         Behavior: Behavior normal.         Thought Content: Thought content normal.         ED Course     3:26 PM  The patient was seen and examined by Shyanne Ordonez MD in Room ED05.   Procedures        The medical record was reviewed and interpreted.  Current labs reviewed and interpreted.       Results for orders placed or performed during the hospital encounter of 07/08/21   XR Chest 2 Views     Status: None    Narrative    EXAM: XR CHEST 2 VW  7/8/2021 4:08 PM     HISTORY:  weakness       COMPARISON:  Chest x-ray 7/1/2021, 6/29/2021.    FINDINGS:   AP and lateral upright chest x-ray. Postsurgical changes chest. Median  sternotomy wires with fractured top 2 wires unchanged. Abandoned left  chest wall ICD lead.    Trachea is midline. Cardiac silhouette is within normal limits.  Pulmonary vasculature is indistinct. Right greater than left mixed  interstitial and airspace opacities, increased from prior exam.  Moderate-sized right pleural effusion, increased from prior exam.  Stable small left pleural effusion. Aortic arch atherosclerotic  calcifications.     No acute osseous abnormality. Visualized soft tissues and upper  abdomen are unremarkable.       Impression    IMPRESSION:   1. Increased right greater than left mixed interstitial and airspace  opacities suggestive for pulmonary edema.  2. Moderate size right pleural effusion, increased from prior exam.   3. Stable small left pleural effusion.    I have personally reviewed the examination and initial interpretation  and I agree with the findings.    ILYA HOBBS MD         SYSTEM ID:  [id number]   XR Finger Left G/E 2 Views     Status: None    Narrative    EXAM: XR FINGER LEFT G/E 2 VIEWS  DATE/TIME: 7/8/2021 7:52 PM     INDICATION: Left finger joint swelling and pain.    COMPARISON: None.      Impression    IMPRESSION:  1.  No fracture or joint malalignment.  2.  Chondrocalcinosis and capsular calcification at the second and  third MCP joints.  3.  Small ossicle adjacent to the thumb CMC joint.       ANURADHA REMY MD         SYSTEM ID:  [id number]   Comprehensive metabolic panel     Status: Abnormal   Result Value Ref Range    Sodium 134 133 - 144 mmol/L    Potassium 4.6 3.4 - 5.3 mmol/L    Chloride 104 94 - 109 mmol/L    Carbon Dioxide 22 20 - 32 mmol/L    Anion Gap 7 3 - 14 mmol/L    Glucose 133 (H) 70 - 99 mg/dL    Urea Nitrogen 37 (H) 7 - 30 mg/dL    Creatinine 2.37 (H) 0.66 - 1.25 mg/dL    GFR Estimate 28 (L) >60 mL/min/[1.73_m2]    GFR Estimate If Black 32 (L) >60 mL/min/[1.73_m2]    Calcium 7.9 (L) 8.5 - 10.1 mg/dL    Bilirubin Total 0.3 0.2 - 1.3 mg/dL    Albumin 2.6 (L) 3.4 - 5.0 g/dL    Protein Total 6.3 (L) 6.8 - 8.8 g/dL    Alkaline Phosphatase 292 (H) 40 - 150 U/L    ALT 55 0 - 70 U/L    AST Canceled, Test credited 0 - 45 U/L   CBC with platelets differential     Status: None   Result Value Ref Range    WBC Canceled, Test credited 4.0 - 11.0 10e9/L    RBC Count Canceled, Test credited 4.4 - 5.9 10e12/L    Hemoglobin Canceled, Test credited 13.3 - 17.7 g/dL    Hematocrit Canceled, Test credited 40.0 - 53.0 %    MCV Canceled, Test credited 78 - 100 fl    MCH Canceled, Test credited 26.5 - 33.0 pg    MCHC Canceled, Test credited 31.5 - 36.5 g/dL    RDW Canceled, Test credited 10.0 - 15.0 %    Platelet Count Canceled, Test credited 150 - 450 10e9/L    Diff Method Canceled, Test credited     % Neutrophils Canceled, Test credited %    % Lymphocytes Canceled, Test credited %    % Monocytes Canceled, Test credited %    % Eosinophils Canceled, Test credited %    % Basophils Canceled, Test credited %    % Metamyelocytes Canceled, Test credited %    % Myelocytes Canceled, Test credited %    Absolute Neutrophil Canceled, Test credited 1.6 - 8.3 10e9/L    Absolute Lymphocytes  Canceled, Test credited 0.8 - 5.3 10e9/L    Absolute Monocytes Canceled, Test credited 0.0 - 1.3 10e9/L    Absolute Eosinophils Canceled, Test credited 0.0 - 0.7 10e9/L    Absolute Basophils Canceled, Test credited 0.0 - 0.2 10e9/L    Absolute Metamyelocytes Canceled, Test credited 0 10e9/L    Absolute Myelocytes Canceled, Test credited 0 10e9/L    Anisocytosis Canceled, Test credited     Poikilocytosis Canceled, Test credited     Polychromasia Canceled, Test credited     [place] Cells Canceled, Test credited     Platelet Estimate Canceled, Test credited    Lactic acid whole blood     Status: None   Result Value Ref Range    Lactic Acid 1.0 0.7 - 2.0 mmol/L   Procalcitonin     Status: None   Result Value Ref Range    Procalcitonin 0.18 ng/ml   Troponin I     Status: None   Result Value Ref Range    Troponin I ES <0.015 0.000 - 0.045 ug/L   Asymptomatic SARS-CoV-2 COVID-19 Virus (Coronavirus) by PCR     Status: None    Specimen: Nasopharyngeal   Result Value Ref Range    SARS-CoV-2 Virus Specimen Source Nasopharyngeal     SARS-CoV-2 PCR Result NEGATIVE     SARS-CoV-2 PCR Comment       Testing was performed using the Xpert Xpress SARS-CoV-2 Assay on the Cepheid Gene-Xpert   Instrument Systems. Additional information about this Emergency Use Authorization (EUA)   assay can be found via the Lab Guide.     CBC with platelets differential     Status: Abnormal   Result Value Ref Range    WBC 8.3 4.0 - 11.0 10e9/L    RBC Count 3.06 (L) 4.4 - 5.9 10e12/L    Hemoglobin 8.9 (L) 13.3 - 17.7 g/dL    Hematocrit 28.9 (L) 40.0 - 53.0 %    MCV 94 78 - 100 fl    MCH 29.1 26.5 - 33.0 pg    MCHC 30.8 (L) 31.5 - 36.5 g/dL    RDW 15.9 (H) 10.0 - 15.0 %    Platelet Count 281 150 - 450 10e9/L    Diff Method Manual Differential     % Neutrophils 92.5 %    % Lymphocytes 4.2 %    % Monocytes 1.7 %    % Eosinophils 0.0 %    % Basophils 0.0 %    % Myelocytes 0.8 %    % Promyelocytes 0.8 %    Absolute Neutrophil 7.7 1.6 - 8.3 10e9/L    Absolute  Lymphocytes 0.3 (L) 0.8 - 5.3 10e9/L    Absolute Monocytes 0.1 0.0 - 1.3 10e9/L    Absolute Eosinophils 0.0 0.0 - 0.7 10e9/L    Absolute Basophils 0.0 0.0 - 0.2 10e9/L    Absolute Myelocytes 0.1 (H) 0 10e9/L    Absolute Promyeloctyes 0.1 (H) 0 10e9/L    Anisocytosis Slight     Poikilocytosis Moderate     Polychromasia Moderate     [place] Cells Moderate     Platelet Estimate Confirming automated cell count    Erythrocyte sedimentation rate auto     Status: Abnormal   Result Value Ref Range    Sed Rate 72 (H) 0 - 20 mm/h   CRP inflammation     Status: Abnormal   Result Value Ref Range    CRP Inflammation 87.0 (H) 0.0 - 8.0 mg/L   EKG 12-lead, tracing only     Status: None (Preliminary result)   Result Value Ref Range    Interpretation ECG Click View Image link to view waveform and result    Blood culture     Status: None (Preliminary result)    Specimen: Hand, Right; Blood    Right Hand   Result Value Ref Range    Specimen Description Blood Right Hand     Culture Micro No growth after 1 hour      Medications   lidocaine 1 % 0.1-1 mL (has no administration in time range)   lidocaine (LMX4) cream (has no administration in time range)   sodium chloride (PF) 0.9% PF flush 3 mL (has no administration in time range)   sodium chloride (PF) 0.9% PF flush 3 mL (has no administration in time range)   medication instruction (has no administration in time range)   alum & mag hydroxide-simethicone (MAALOX) suspension 30 mL (has no administration in time range)   acetaminophen (TYLENOL) tablet 650 mg (has no administration in time range)   acetaminophen (TYLENOL) Suppository 650 mg (has no administration in time range)   enoxaparin ANTICOAGULANT (LOVENOX) injection 40 mg (has no administration in time range)   albuterol (PROAIR HFA/PROVENTIL HFA/VENTOLIN HFA) 108 (90 Base) MCG/ACT inhaler 2 puff (has no administration in time range)   allopurinol (ZYLOPRIM) tablet 300 mg (has no administration in time range)   amitriptyline (ELAVIL)  tablet 25 mg (has no administration in time range)   artificial tears ophthalmic ointment 1 g (has no administration in time range)   aspirin (ASA) chewable tablet 81 mg (has no administration in time range)   buPROPion (WELLBUTRIN) tablet 75 mg (has no administration in time range)   gabapentin (NEURONTIN) capsule 300 mg (has no administration in time range)   gabapentin (NEURONTIN) capsule 300 mg (has no administration in time range)   insulin glargine (LANTUS PEN) injection 24 Units (has no administration in time range)   montelukast (SINGULAIR) tablet 10 mg (has no administration in time range)   mycophenolate (GENERIC EQUIVALENT) capsule 1,000 mg (has no administration in time range)   nystatin (MYCOSTATIN) suspension 1,000,000 Units (has no administration in time range)   pantoprazole (PROTONIX) EC tablet 40 mg (has no administration in time range)   polyethylene glycol (MIRALAX) powder 17 g (has no administration in time range)   predniSONE (DELTASONE) tablet 15 mg (has no administration in time range)   rosuvastatin (CRESTOR) tablet 10 mg (has no administration in time range)   sulfamethoxazole-trimethoprim (BACTRIM) 400-80 MG per tablet 1 tablet (has no administration in time range)   traMADol (ULTRAM) half-tab 25-50 mg (has no administration in time range)   valGANciclovir (VALCYTE) tablet 450 mg (has no administration in time range)   tacrolimus (GENERIC EQUIVALENT) capsule 5 mg (has no administration in time range)   tacrolimus (GENERIC EQUIVALENT) capsule 4.5 mg (has no administration in time range)   lidocaine (PF) (XYLOCAINE) 1 % injection (has no administration in time range)   fluticasone-vilanterol (BREO ELLIPTA) 100-25 MCG/INH inhaler 1 puff (has no administration in time range)   umeclidinium (INCRUSE ELLIPTA) 62.5 MCG/INH inhaler 1 puff (has no administration in time range)   glucose gel 15-30 g (has no administration in time range)     Or   dextrose 50 % injection 25-50 mL (has no  administration in time range)     Or   glucagon injection 1 mg (has no administration in time range)        Assessments & Plan (with Medical Decision Making)  Patient is a very nice 66-year-old male with a history of a heart transplant at the beginning of May who presents to the ER due to ongoing weakness.  Patient has been feeling ongoing tiredness, has been stumbling with gait.  Patient's been having falls.  Patient was called by his care coordinator today and told to come to the ER because he seemed to have failure to thrive at home.  Patient was last discharged from the hospital 4 days prior after being diagnosed with a pneumonia.  Patient has recently finished his antibiotics.  The case was discussed with Dr. Rolo Bahena.  Patient will be admitted to the [facility] 2 service for further evaluation and care.  Patient and significant other agree with plan.  This part of the medical record was transcribed by Kateryna Mcfadden, Medical Scribe, from a dictation done by Shyanne Ordonez MD.        I have reviewed the nursing notes. I have reviewed the findings, diagnosis, plan and need for follow up with the patient.    Current Discharge Medication List          Final diagnoses:   Transplanted heart (H)   Generalized muscle weakness   Fall, initial encounter - weakness     I, Kateryna Pinzon, am serving as a trained medical scribe to document services personally performed by Shyanne Ordonez MD, based on the provider's statements to me.     I, Shyanne Ordonez MD, was physically present and have reviewed and verified the accuracy of this note documented by Kateryna Pinzon.    --  Shyanne Ordonez MD  [facility] EMERGENCY DEPARTMENT  7/8/2021     Shyanne Ordonez MD  07/08/21 [id number]

## 2021-07-08 NOTE — Clinical Note
Prepped: right neck. Prepped with: ChloraPrep. The patient was draped. .Pre-procedure site marking:Insertion site not predetermined

## 2021-07-08 NOTE — TELEPHONE ENCOUNTER
Pt c/o low grade fever, gout on left hand, and 3 falls since being home from hospital on 7/5. After discussing with Dr. Montgomery and Kiesha Wilder, all agree to send patient to ER for further evaluation. Both Jim and wife in agreement with plan.     ER called and report given to charge nurse, Zahira Engle, , also working on potential ARU placement.

## 2021-07-08 NOTE — PROGRESS NOTES
Transplant Social Work Services Phone Call      Data: Pt is s/p heart transplant on 5/6/2021. Pt most recently admitted 6/28-7/5/2021 for SOB, cough, fevers/chills and fatigue. Pt was discharged home with [facility].   Writer received a VM from pt's wife expressing safety concerns since pt was discharged home. Writer contacted wife and she reports pt has fallen 3-4 times, since recent discharge, with last fall being last night.   Writer discussed with pt's Transplant Coordinator, whom was in contact with pt's primary Cardiologist, and best for pt to return for admission to facilitate placement.   Writer called pt to discuss concerns and that recommendation was for him to return to the ED for admission and facilitate placement, preferably at  TCU/ARU as pt is a new heart transplant.     Intervention: Supportive Phone Call.   Assessment: Pt and wife are agreeable to recommendation to return for admission for placement. Wife will provide transportation after an appt she has for herself 11-12 today.   Education provided by SW: Ongoing Social Work support   Plan:    Due to concerns with multiple falls and pt has had poor oral intake, pt coming in for re-admission for placement. Pt's Transplant Coordinator and Cardiologist in agreement with plan. Writer will see pt and start referral to  Rehab when he is in the ED.

## 2021-07-09 ENCOUNTER — APPOINTMENT (OUTPATIENT)
Dept: OCCUPATIONAL THERAPY | Facility: CLINIC | Age: 66
DRG: 186 | End: 2021-07-09
Payer: COMMERCIAL

## 2021-07-09 ENCOUNTER — APPOINTMENT (OUTPATIENT)
Dept: PHYSICAL THERAPY | Facility: CLINIC | Age: 66
DRG: 186 | End: 2021-07-09
Payer: COMMERCIAL

## 2021-07-09 LAB
ALBUMIN UR-MCNC: 50 MG/DL
AMORPH CRY #/AREA URNS HPF: ABNORMAL /HPF
ANION GAP SERPL CALCULATED.3IONS-SCNC: 5 MMOL/L (ref 3–14)
APPEARANCE UR: ABNORMAL
BILIRUB UR QL STRIP: NEGATIVE
BUN SERPL-MCNC: 41 MG/DL (ref 7–30)
CALCIUM SERPL-MCNC: 7.5 MG/DL (ref 8.5–10.1)
CHLORIDE SERPL-SCNC: 108 MMOL/L (ref 94–109)
CO2 SERPL-SCNC: 23 MMOL/L (ref 20–32)
COLOR UR AUTO: YELLOW
CREAT SERPL-MCNC: 2.33 MG/DL (ref 0.66–1.25)
CRP SERPL-MCNC: 98 MG/L (ref 0–8)
ERYTHROCYTE [DISTWIDTH] IN BLOOD BY AUTOMATED COUNT: 15.9 % (ref 10–15)
GFR SERPL CREATININE-BSD FRML MDRD: 28 ML/MIN/{1.73_M2}
GLUCOSE BLDC GLUCOMTR-MCNC: 190 MG/DL (ref 70–99)
GLUCOSE SERPL-MCNC: 122 MG/DL (ref 70–99)
GLUCOSE UR STRIP-MCNC: NEGATIVE MG/DL
GRAN CASTS #/AREA URNS LPF: 1 /LPF
HCT VFR BLD AUTO: 30.4 % (ref 40–53)
HGB BLD-MCNC: 9.3 G/DL (ref 13.3–17.7)
HGB UR QL STRIP: NEGATIVE
HYALINE CASTS #/AREA URNS LPF: 1 /LPF (ref 0–2)
INTERPRETATION ECG - MUSE: NORMAL
KETONES UR STRIP-MCNC: NEGATIVE MG/DL
LEUKOCYTE ESTERASE UR QL STRIP: NEGATIVE
MAGNESIUM SERPL-MCNC: 1.6 MG/DL (ref 1.6–2.3)
MCH RBC QN AUTO: 28.3 PG (ref 26.5–33)
MCHC RBC AUTO-ENTMCNC: 30.6 G/DL (ref 31.5–36.5)
MCV RBC AUTO: 92 FL (ref 78–100)
MUCOUS THREADS #/AREA URNS LPF: PRESENT /LPF
NITRATE UR QL: NEGATIVE
PH UR STRIP: 5.5 PH (ref 5–7)
PLATELET # BLD AUTO: 292 10E9/L (ref 150–450)
POTASSIUM SERPL-SCNC: 4.8 MMOL/L (ref 3.4–5.3)
RBC # BLD AUTO: 3.29 10E12/L (ref 4.4–5.9)
RBC #/AREA URNS AUTO: 1 /HPF (ref 0–2)
SODIUM SERPL-SCNC: 136 MMOL/L (ref 133–144)
SOURCE: ABNORMAL
SP GR UR STRIP: 1.02 (ref 1–1.03)
SQUAMOUS #/AREA URNS AUTO: <1 /HPF (ref 0–1)
TACROLIMUS BLD-MCNC: 12.1 UG/L (ref 5–15)
TME LAST DOSE: NORMAL H
TSH SERPL DL<=0.005 MIU/L-ACNC: 3.16 MU/L (ref 0.4–4)
UROBILINOGEN UR STRIP-MCNC: NORMAL MG/DL (ref 0–2)
WBC # BLD AUTO: 7.1 10E9/L (ref 4–11)
WBC #/AREA URNS AUTO: 3 /HPF (ref 0–5)

## 2021-07-09 PROCEDURE — 80048 BASIC METABOLIC PNL TOTAL CA: CPT

## 2021-07-09 PROCEDURE — 250N000013 HC RX MED GY IP 250 OP 250 PS 637: Performed by: STUDENT IN AN ORGANIZED HEALTH CARE EDUCATION/TRAINING PROGRAM

## 2021-07-09 PROCEDURE — 250N000013 HC RX MED GY IP 250 OP 250 PS 637

## 2021-07-09 PROCEDURE — 85027 COMPLETE CBC AUTOMATED: CPT

## 2021-07-09 PROCEDURE — 87086 URINE CULTURE/COLONY COUNT: CPT | Performed by: EMERGENCY MEDICINE

## 2021-07-09 PROCEDURE — 250N000012 HC RX MED GY IP 250 OP 636 PS 637

## 2021-07-09 PROCEDURE — 250N000013 HC RX MED GY IP 250 OP 250 PS 637: Performed by: INTERNAL MEDICINE

## 2021-07-09 PROCEDURE — 97535 SELF CARE MNGMENT TRAINING: CPT | Mod: GO

## 2021-07-09 PROCEDURE — 81001 URINALYSIS AUTO W/SCOPE: CPT | Performed by: EMERGENCY MEDICINE

## 2021-07-09 PROCEDURE — 84443 ASSAY THYROID STIM HORMONE: CPT

## 2021-07-09 PROCEDURE — 99233 SBSQ HOSP IP/OBS HIGH 50: CPT | Mod: 24 | Performed by: INTERNAL MEDICINE

## 2021-07-09 PROCEDURE — 97165 OT EVAL LOW COMPLEX 30 MIN: CPT | Mod: GO

## 2021-07-09 PROCEDURE — 97530 THERAPEUTIC ACTIVITIES: CPT | Mod: GO

## 2021-07-09 PROCEDURE — 250N000009 HC RX 250: Performed by: INTERNAL MEDICINE

## 2021-07-09 PROCEDURE — 97530 THERAPEUTIC ACTIVITIES: CPT | Mod: GP

## 2021-07-09 PROCEDURE — 0R9X3ZX DRAINAGE OF LEFT FINGER PHALANGEAL JOINT, PERCUTANEOUS APPROACH, DIAGNOSTIC: ICD-10-PCS | Performed by: ORTHOPAEDIC SURGERY

## 2021-07-09 PROCEDURE — 97162 PT EVAL MOD COMPLEX 30 MIN: CPT | Mod: GP

## 2021-07-09 PROCEDURE — 36415 COLL VENOUS BLD VENIPUNCTURE: CPT

## 2021-07-09 PROCEDURE — 83735 ASSAY OF MAGNESIUM: CPT

## 2021-07-09 PROCEDURE — 999N001017 HC STATISTIC GLUCOSE BY METER IP

## 2021-07-09 PROCEDURE — 97116 GAIT TRAINING THERAPY: CPT | Mod: GP

## 2021-07-09 PROCEDURE — 86140 C-REACTIVE PROTEIN: CPT

## 2021-07-09 PROCEDURE — 214N000001 HC R&B CCU UMMC

## 2021-07-09 PROCEDURE — 250N000011 HC RX IP 250 OP 636

## 2021-07-09 PROCEDURE — 80197 ASSAY OF TACROLIMUS: CPT

## 2021-07-09 RX ORDER — SULFAMETHOXAZOLE AND TRIMETHOPRIM 400; 80 MG/1; MG/1
1 TABLET ORAL DAILY
Status: DISCONTINUED | OUTPATIENT
Start: 2021-07-10 | End: 2021-07-11

## 2021-07-09 RX ORDER — LIDOCAINE 40 MG/G
CREAM TOPICAL
Status: CANCELLED | OUTPATIENT
Start: 2021-07-09

## 2021-07-09 RX ORDER — VALGANCICLOVIR 450 MG/1
450 TABLET, FILM COATED ORAL EVERY OTHER DAY
Status: DISCONTINUED | OUTPATIENT
Start: 2021-07-10 | End: 2021-07-15

## 2021-07-09 RX ADMIN — SULFAMETHOXAZOLE AND TRIMETHOPRIM 1 TABLET: 400; 80 TABLET ORAL at 08:52

## 2021-07-09 RX ADMIN — NYSTATIN 1000000 UNITS: 500000 SUSPENSION ORAL at 08:52

## 2021-07-09 RX ADMIN — ASPIRIN 81 MG CHEWABLE TABLET 81 MG: 81 TABLET CHEWABLE at 08:52

## 2021-07-09 RX ADMIN — Medication 50 MG: at 15:18

## 2021-07-09 RX ADMIN — GABAPENTIN 300 MG: 300 CAPSULE ORAL at 08:51

## 2021-07-09 RX ADMIN — MONTELUKAST 10 MG: 10 TABLET, FILM COATED ORAL at 22:01

## 2021-07-09 RX ADMIN — AMITRIPTYLINE HYDROCHLORIDE 25 MG: 25 TABLET, FILM COATED ORAL at 22:01

## 2021-07-09 RX ADMIN — Medication 50 MG: at 08:48

## 2021-07-09 RX ADMIN — BUPROPION HYDROCHLORIDE 75 MG: 75 TABLET, FILM COATED ORAL at 20:41

## 2021-07-09 RX ADMIN — MYCOPHENOLATE MOFETIL 1000 MG: 250 CAPSULE ORAL at 20:41

## 2021-07-09 RX ADMIN — MYCOPHENOLATE MOFETIL 1000 MG: 250 CAPSULE ORAL at 08:51

## 2021-07-09 RX ADMIN — Medication 10 MG: at 22:01

## 2021-07-09 RX ADMIN — MINERAL OIL AND PETROLATUM 1 G: 150; 830 OINTMENT OPHTHALMIC at 22:16

## 2021-07-09 RX ADMIN — ANAKINRA 100 MG: 100 INJECTION, SOLUTION SUBCUTANEOUS at 16:05

## 2021-07-09 RX ADMIN — PREDNISONE 15 MG: 5 TABLET ORAL at 08:51

## 2021-07-09 RX ADMIN — TACROLIMUS 4.5 MG: 1 CAPSULE ORAL at 18:13

## 2021-07-09 RX ADMIN — ACETAMINOPHEN 650 MG: 325 TABLET, FILM COATED ORAL at 22:00

## 2021-07-09 RX ADMIN — GABAPENTIN 300 MG: 300 CAPSULE ORAL at 12:38

## 2021-07-09 RX ADMIN — PANTOPRAZOLE SODIUM 40 MG: 40 TABLET, DELAYED RELEASE ORAL at 20:41

## 2021-07-09 RX ADMIN — BUPROPION HYDROCHLORIDE 75 MG: 75 TABLET, FILM COATED ORAL at 08:52

## 2021-07-09 RX ADMIN — GABAPENTIN 300 MG: 300 CAPSULE ORAL at 20:41

## 2021-07-09 RX ADMIN — ENOXAPARIN SODIUM 40 MG: 40 INJECTION SUBCUTANEOUS at 20:41

## 2021-07-09 RX ADMIN — PANTOPRAZOLE SODIUM 40 MG: 40 TABLET, DELAYED RELEASE ORAL at 08:51

## 2021-07-09 RX ADMIN — NYSTATIN 1000000 UNITS: 500000 SUSPENSION ORAL at 20:42

## 2021-07-09 RX ADMIN — UMECLIDINIUM 1 PUFF: 62.5 AEROSOL, POWDER ORAL at 08:53

## 2021-07-09 RX ADMIN — ACETAMINOPHEN 650 MG: 325 TABLET, FILM COATED ORAL at 08:48

## 2021-07-09 RX ADMIN — ALLOPURINOL 300 MG: 300 TABLET ORAL at 08:51

## 2021-07-09 RX ADMIN — INSULIN GLARGINE 24 UNITS: 100 INJECTION, SOLUTION SUBCUTANEOUS at 22:01

## 2021-07-09 RX ADMIN — NYSTATIN 1000000 UNITS: 500000 SUSPENSION ORAL at 16:00

## 2021-07-09 RX ADMIN — ACETAMINOPHEN 650 MG: 325 TABLET, FILM COATED ORAL at 12:38

## 2021-07-09 RX ADMIN — Medication 50 MG: at 22:00

## 2021-07-09 RX ADMIN — FLUTICASONE FUROATE AND VILANTEROL TRIFENATATE 1 PUFF: 100; 25 POWDER RESPIRATORY (INHALATION) at 08:54

## 2021-07-09 RX ADMIN — TACROLIMUS 5 MG: 5 CAPSULE ORAL at 08:52

## 2021-07-09 RX ADMIN — ROSUVASTATIN CALCIUM 10 MG: 10 TABLET, FILM COATED ORAL at 08:52

## 2021-07-09 RX ADMIN — NYSTATIN 1000000 UNITS: 500000 SUSPENSION ORAL at 12:37

## 2021-07-09 ASSESSMENT — ACTIVITIES OF DAILY LIVING (ADL)
ADLS_ACUITY_SCORE: 14
ADLS_ACUITY_SCORE: 13

## 2021-07-09 ASSESSMENT — MIFFLIN-ST. JEOR: SCORE: 1624.07

## 2021-07-09 NOTE — PROGRESS NOTES
07/09/21 1031   Quick Adds   Type of Visit Initial Occupational Therapy Evaluation   Living Environment   People in home grandchild(lidia);spouse   Current Living Arrangements house   Home Accessibility stairs to enter home;stairs within home   Number of Stairs, Main Entrance 1   Stair Railings, Main Entrance none   Number of Stairs, Within Home, Primary 8   Stair Railings, Within Home, Primary railing on right side (ascending)   Transportation Anticipated family or friend will provide   Living Environment Comments tub shower with chair. uses 4ww at home. spouse A with all IADL's. Pt reports he was IND with most ADLs at home at d/c from initial hospitalization.    Self-Care   Usual Activity Tolerance moderate   Current Activity Tolerance poor   Regular Exercise No   Equipment Currently Used at Home walker, rolling;shower chair   Activity/Exercise/Self-Care Comment uses 4ww for all mobility. was becoming very short of breath and weak at home.    Disability/Function   Hearing Difficulty or Deaf no   Wear Glasses or Blind yes   Vision Management glasses   Concentrating, Remembering or Making Decisions Difficulty no   Difficulty Communicating no   Difficulty Eating/Swallowing no   Walking or Climbing Stairs Difficulty yes   Walking or Climbing Stairs ambulation difficulty, requires equipment   Mobility Management uses 4ww    Dressing/Bathing Difficulty no   Dressing/Bathing dressing difficulty, requires equipment   Dressing/Bathing Management use of shower chair    Toileting issues no   Doing Errands Independently Difficulty (such as shopping) yes   Errands Management spouse A with all errands   Fall history within last six months yes   Number of times patient has fallen within last six months   (pt reports he falls out of bed)   Change in Functional Status Since Onset of Current Illness/Injury yes   General Information   Onset of Illness/Injury or Date of Surgery 07/08/21   Referring Physician Rolo   Patient/Family  Therapy Goal Statement (OT) open to TCU stay    Additional Occupational Profile Info/Pertinent History of Current Problem Jim Willingham is a 66 year old male with a history of NICM s/p OHT (5/6/21) postoperative course was complicated by right pleural air leak with prolonged chest tube, paroxysmal atrial fibrillation, COPD, CKDIII, DMII, anemia who presents to the hospital for generalized weakness and fatigue   Existing Precautions/Restrictions fall   Left Upper Extremity (Weight-bearing Status) weight-bearing as tolerated (WBAT)   Cognitive Status Examination   Orientation Status orientation to person, place and time   Affect/Mental Status (Cognitive) WNL   Follows Commands WNL   Visual Perception   Visual Impairment/Limitations WNL   Sensory   Sensory Quick Adds No deficits were identified   Pain Assessment   Patient Currently in Pain Yes, see Vital Sign flowsheet   Integumentary/Edema   Integumentary/Edema Comments L index finger aspiration, gout    Posture   Posture kyphosis   Range of Motion Comprehensive   Comment, General Range of Motion LUE NT due to pain, RUE WFL   Strength Comprehensive (MMT)   Comment, General Manual Muscle Testing (MMT) Assessment generalized weakness, BUE/LE    Muscle Tone Assessment   Muscle Tone Quick Adds No deficits were identified   Coordination   Upper Extremity Coordination Left UE impaired   Instrumental Activities of Daily Living (IADL)   IADL Comments spouse A with all IADLs    Clinical Impression   Criteria for Skilled Therapeutic Interventions Met (OT) yes   OT Diagnosis dec IND with ADL/IADLs and transfers   OT Problem List-Impairments impacting ADL activity tolerance impaired;balance;pain;range of motion (ROM);strength   Assessment of Occupational Performance 5 or more Performance Deficits   Identified Performance Deficits all ADLS and transfers   Planned Therapy Interventions (OT) ADL retraining;ROM;strengthening;transfer training;home program guidelines   Clinical  Decision Making Complexity (OT) low complexity   Therapy Frequency (OT) 5x/week   Predicted Duration of Therapy 7 days   Risk & Benefits of therapy have been explained evaluation/treatment results reviewed;care plan/treatment goals reviewed;risks/benefits reviewed;current/potential barriers reviewed;patient   OT Discharge Planning    OT Discharge Recommendation (DC Rec) Transitional Care Facility   OT Rationale for DC Rec at this time, rec TCU as pt below baseline function and having difficulty caring for self at home due to inc weakness and fatigue. would benefit from TCU stay as pt below basleine function for ADL/IADLs and transfers. Limited by fatigue and weakness this date. At this time, pt would have difficulty tolerating 3 hours of therapy/day at [facility] therefore rec TCU.    OT Brief overview of current status  A x 1 with platform fWW    Total Evaluation Time (Minutes)   Total Evaluation Time (Minutes) 4

## 2021-07-09 NOTE — PROGRESS NOTES
CLINICAL NUTRITION SERVICES - ASSESSMENT NOTE     Nutrition Prescription    RECOMMENDATIONS FOR MDs/PROVIDERS TO ORDER:  1. Rec a regular diet order without a caffeine restriction (unless restricting caffeine is necessary) to encourage oral intake. Tray set-up assistance at meals.   2. Check vitamin B12 and folic acid labs. Rec supplement if folic acid is low. Rec supplement vitamin B12 (see rec #3)   3. Order the following (Sylvester-en-y Gastric Bypass):    Multivitamin/minerals: Adult dose 2 times daily to help ensure micronutrient needs are met.     Vitamin B12: Sublingual form (pt prefers) of at least 500 mcg daily. Pt received B12 on 5/13/21. Had been ordered to receive vitamin B12 Q 30 days. Pt's vitamin B12 was 734 on 5/13/21.   4. Consider an appetite stimulant.   5. Consider TFs if consuming less than 1225 kcals and 60 g protein daily. See future/additional recs below.     Malnutrition Status:    Severe malnutrition in the context of acute illness/injury on chronic illness    Recommendations already ordered by Registered Dietitian (RD):  Oral supplements  Kcal counts 7/9-7/11  Snack      Future/Additional Recommendations:  1. Continue fluid restriction as per team.   2. Order calcium/vitamin D twice daily since pt is on prednisone.   3. Monitor BG control. DM 2. Hgb A1c of 6.2 on 1/7/21, 5.1 on 5/4/21. BG was 122 on 7/9. H/o hypoglycemia.     4. Monitor lytes (Phos, Mg++, and K+) for refeeding syndrome. If lytes trend low, aggressively replace. Note, no urinary ketones per urinalysis on 7/9.  5. Monitor K+ trends (K+ was 4/8 on 7/9) and potential need to adjust diet order.   6. Consider checking zinc status and supplement for short course if low. Note, zinc may be low due to possible infection.   7. If TFs are needed, rec place feeding tube (FT) via radiology due to RNY hx and initiate TFs, order Osmolite 1.5 (concentrated, maintenance TF formula), and order Prosource TF modular (1 pkt daily). Initiate TFs at  "15 mL/hr, advancing rate by 10 mL Q 8 hrs (or per provider discretion, pending hemodynamic stability) to goal rate of 60 mL/hr. Osmolite 1.5 Pete at goal of 60 mL/hr (1440ml/day) will provide: 2160+ kcals, 90+ g PRO, 1097 ml free H20, 293 g CHO, and 0 g fiber daily. Each packet of ProSource TF modular provides 40 kcals and 11 g protein.                  If begin tube feeds:               - Flush FT with 30 mL water Q 4 hrs for patency. Rec provider adjust free water flushes as needed, pending fluid status.              - Ensure K+/Mg++/Phos labs are ordered daily until TFs advance to goal infusion to evaluate for sx of refeeding with nutrition received. If lytes trend low, aggressively replace lytes.                  - If not already ordered, order a multivitamin/mineral (certavite 15 mL/day via FT) to help ensure micronutrient needs being met with suspected hypermetabolic demands and potential interruptions to TF infusions.              - Monitor TF and possible need to adjust nutrition support regimen if necessary, pending medical course and nutrition status.                  - Monitor need to check a metabolic cart study.                - Send a nutrition consult for \"Registered Dietitian to Order TF per Medical Nutrition Therapy Guidelines\" if desire RD to order TFs.               - If K+ trends high and if needs Novasource Renal TF (lower in K+), then rec a goal rate of 45 mL/hr.      REASON FOR ASSESSMENT  Jim Willingham is a/an 66 year old male assessed by the dietitian for Admission Nutrition Risk Screen for malnutrition score of two or greater and Provider Order - \"Generalized weakness, fatigue since discharge 3 days ago. Eating 1 small meal a day. Concern for malnutrition.\"    NUTRITION/ADDITIONAL HISTORY  RD note 6/30/21: \"Per discussion with pt, he does not always have an appetite. States he has been eating less in particular over the last three to four days. Per pt, 'eating nothing.' Having early satiety " "and not feeling well with a lack of appetite. Pt likes and consumes chocolate Premier Protein at home intermittently. Does not routinely take a multivitamin with minerals or vitamin B12. No known food allergies as per discussion with pt/family.\"  Per H & P, \"History of NICM s/p OHT (5/6/21) postoperative course was complicated by right pleural air leak with prolonged chest tube, paroxysmal atrial fibrillation, COPD, CKDIII, DMII, anemia who presents to the hospital for generalized weakness and fatigue.He denies any fevers, chills, changes in vision, shortness of breath, chest pain, cough, swelling in extremities, nausea, vomiting, constipation, diarrhea, abdominal pain, heart palpitations.\" Pt ordered to take, noting, bumex, calcium/vitamin D, vitamin B12, insulin, protonix, miralax, senna, and prednisone PTA.   Per discussion with pt, he has been eating less since 6/20/21, about 20 days. States he is generally eating only supper (one meal per day). He does consume Premier Protein, up to two oral supplements daily. States he likes strawberry-flavored Premier Protein but currently has chocolate at home. States it has been a while (months) since he had vitamin B12 supplementation.     CURRENT NUTRITION ORDERS  Diet: Low Saturated Fat/2400 mg Sodium, no caffeine  Intake/Tolerance: No data as pt has just been admitted.     LABS  Labs reviewed    MEDICATIONS  Medications reviewed    ANTHROPOMETRICS  Height: 172.7 cm (5' 8\")  Most Recent Weight: 86 kg (189 lb 8 oz)    IBW: 70 kg   BMI: Overweight BMI 25-29.9  Wt Hx: 118.4 kg (2/13/20), 114.2 kg (6/24/20), 106.2 kg (8/7/20), 103.9 kg (11/13/20), 94.8 kg (1/12/21), 99.5 kg (2/8/21 at time of admit during which he received a Tx), 100.2 kg (2/15/21), 98.2 kg (2/22), 100 kg (3/2), 99.7 kg (3/11), 99.8 kg (4/7/21), 99.9 kg (4/9), 100.4 kg (5/26), 88.8 kg (6/7/21), 89.9 kg (6/28), 88.7 kg (7/8, admit), 86 kg (7/9) - Weight fluctuations, likely due to fluid shifts and post-op " status but actual wt loss as well. Pt has lost 13% of body wt over the last three months.  Dosing Weight: 74 kg (adjusted, based on lowest wt so far this admission of 86 kg on 7/9)    ASSESSED NUTRITION NEEDS (for inpatient hospital stay)  Estimated Energy Needs: 7198-8145 kcals/day (25 - 30 kcals/kg)  Justification: Increased needs with stress factors, decreased needs with wt status.   Estimated Protein Needs:  grams protein/day (1.2 - 1.5 grams of pro/kg)  Justification: Increased needs with stress factors, pending renal function  Estimated Fluid Needs: 5464-1174 mL/day (25 - 30 mL/kg)   Justification: Maintenance needs or per team, pending fluid status      PHYSICAL FINDINGS/OTHER FINDINGS  See malnutrition section below.  GI: Formed and soft stools. Last stool on 7/9    MALNUTRITION  % Intake: </= 50% for >/= 5 days (severe)  % Weight Loss: > 7.5% in 3 months (severe)  Subcutaneous Fat Loss: None observed  Muscle Loss: None observed  Fluid Accumulation/Edema: None noted  Malnutrition Diagnosis: Severe malnutrition in the context of acute illness/injury on chronic illness    NUTRITION DIAGNOSIS  Inadequate oral intake related to lack of appetite and early satiety as evidenced by pt report of consuming one meal per day, eating less for the last approximate 20 days, and has lost 13% of body wt over the last three months.    INTERVENTIONS  Implementation  Nutrition Education: Encouraged intake of tolerated foods and beverages. Rec snacks/supplements as small, frequent meals. Suggested trying to eat a small meal earlier in the day at home.     Ordered kcal counts 7/9-7/11  Medical food supplement therapy: Discussed oral supplements with pt. Ordered Glucerna, butter pecan at 10:00 and vanilla at 14:00. Rec he consume three Premier Protein oral supplements at home daily.   Collaboration with other providers: Paged team regarding recs for MDs/providers to order.    Modify composition of meals/snacks: Special K bar  at supper daily.     Goals  Patient to consume % of nutritionally adequate meal trays TID, or the equivalent with supplements/snacks.     Monitoring/Evaluation  Progress toward goals will be monitored and evaluated per protocol.        Nutrition will continue to follow.      Sana Costello MS, RD, LD, [facility]   6C Pgr: [phone]

## 2021-07-09 NOTE — UTILIZATION REVIEW
Admission Status; Secondary Review Determination       Under the authority of the Utilization Management Committee, the utilization review process indicated a secondary review on the above patient. The review outcome is based on review of the medical records, discussions with staff, and applying clinical experience noted on the date of the review.     (x) Inpatient Status Appropriate - This patient's medical care is consistent with medical management for inpatient care and reasonable inpatient medical practice.     RATIONALE FOR DETERMINATION    66 year old male w/ PMH nonischemic cardiomyopathy s/p heart transplant 5/6/2021, postoperative course was complicated by right pleural air leak with prolonged chest tube, paroxysmal atrial fibrillation, COPD, CKDIII, DMII, anemia, recently hospitalized 6/28 for sepsis w/ CAP, treated w/ cefepime, levaquin, azithromycin, and vancomycin w/ 1 of 2 blood cx w/ staph epi 2/2 contaminent; discharged home and re-presented to ED 7/8 for generalized weakness and fatigue w/ lightheadedness while walking; Ortho consulted for L index MCP swelling concerning for gout vs septic arthritis.   Also concern for recurrence of pleural effusion per primary team, discussed with cards 2 resident.  The expected length of stay at the time of admission was more than 2 nights because of the severity of illness, intensity of service provided, and risk for adverse outcome. Inpatient admission is appropriate.     This document was produced using voice recognition software       The information on this document is developed by the utilization review team in order for the business office to ensure compliance. This only denotes the appropriateness of proper admission status and does not reflect the quality of care rendered.   The definitions of Inpatient Status and Observation Status used in making the determination above are those provided in the CMS Coverage Manual, Chapter 1 and Chapter 6, section  70.4.   Sincerely,   MAAME PEREZ MD   System Medical Director   Utilization Management   [facility].

## 2021-07-09 NOTE — PLAN OF CARE
D: Admitted 7/09 for generalized weakness and fatigue.    Hx: NICM s/p OHT (5/6/21) postop course c/b right pleural air leak with prolonged chest tube, paroxysmal atrial fibrillation, COPD, CKDIII, DMII, and anemia.      I: Monitored vitals and assessed pt status.   Changed: UA/UC collected this AM  PRN: tylenol x1, Tramadol x1, melatonin at HS     A: A0x4. Pt with multiple falls PTA; bed alarm on for safety. VSS on RA. Tele shows ST, rate 100-120. Afebrile. Reported pain in L finger. Ortho saw pt and ricardo synovial fluid to rule out infection and help relieve pressure. Prn pain meds given with some relief. Urinating adequately. LBM 7/8. Up with Ax1-2. Appeared to rest comfortably overnight.      P: Awaiting placement at TCU/ARU. Continue to monitor Pt status and report changes to Cards 2.     [id number]  Rajani Holman RN on 7/9/2021 at 6:11 AM

## 2021-07-09 NOTE — PROGRESS NOTES
[facility]   Patient is currently open to home care services with [facility]. The patient is currently receiving RN PT OT services. [facility]  and team have been notified of patient admission. [facility] liaison will continue to follow patient during stay. If appropriate provide orders to resume home care at time of discharge.    Debi Meier RN BSN  [facility] Liaison  [phone]

## 2021-07-09 NOTE — PHARMACY-ADMISSION MEDICATION HISTORY
Admission Medication History Completed by Pharmacy    See [facility] Admission Navigator for allergy information, preferred outpatient pharmacy, prior to admission medications and immunization status.     Medication History Sources:     Patient    Dispense History    Changes made to PTA medication list (reason):    Added: None    Deleted:   o Vitamin B12 injection (self-reported, not had over a year since B12 levels were normal per patient)    Changed:  o Gabapentin 300mg tablet (1 capsule every morning, afternoon, & night --> 1 capsule every morning & afternoon, 2 capsules every night per patient)  o Prednisone (15mg every morning --> 20mg every morning & 5mg every evening per patient)  o Tacrolimus (changed to taking 4.5mg every morning & evening per patient)    Additional Information:    Patient knows medication history very well and last took medications at home on 7/8 AM since last discharge on 7/5.    Patient states he does not like getting repeated injections and requests Vitamin B12 sublingual tablets instead of injections if needed in the future.    Pt has been taking Bactrim (1 tablet daily) at home since last discharge.    Prior to Admission medications    Medication Sig Last Dose Taking? Auth Provider   acetaminophen (TYLENOL) 325 MG tablet Take 3 tablets (975 mg) by mouth every 6 hours as needed for other (For optimal non-opioid multimodal pain management to improve pain control.) 7/8/2021 at am Yes Rajani Wilder APRN CNP   albuterol (PROAIR HFA) 108 (90 Base) MCG/ACT inhaler Inhale 2 puffs into the lungs every 4 hours as needed for shortness of breath / dyspnea or wheezing 7/8/2021 at am Yes Clara Valentin PA-C   allopurinol (ZYLOPRIM) 300 MG tablet Take 1 tablet (300 mg) by mouth daily 7/8/2021 at am Yes Clara Valentin PA-C   amitriptyline (ELAVIL) 25 MG tablet Take 1 tablet (25 mg) by mouth At Bedtime 7/7/2021 at pm Yes Rajani Wilder APRN CNP   artificial tears OINT ophthalmic ointment Place  1 g into both eyes At Bedtime 7/7/2021 at pm Yes Rajani Wilder APRN CNP   aspirin (ASA) 81 MG chewable tablet Take 1 tablet (81 mg) by mouth daily 7/8/2021 at am Yes Clara Valentin PA-C   bumetanide (BUMEX) 1 MG tablet Take 2 mg in the morning and 1 mg in the afternoons 7/8/2021 at am Yes Rajani Wilder APRN CNP   buPROPion (WELLBUTRIN) 75 MG tablet Take 1 tablet (75 mg) by mouth 2 times daily 7/8/2021 at am Yes Clara Valentin PA-C   calcium citrate-vitamin D (CITRACAL) 315-250 MG-UNIT TABS per tablet Take 1 tablet by mouth 2 times daily 7/8/2021 at am Yes Clara Valentin PA-C   cyclobenzaprine (FLEXERIL) 5 MG tablet Take 5 mg by mouth 3 times daily as needed for muscle spasms More than a month Yes Reported, Patient   Fluticasone-Umeclidin-Vilanterol (TRELEGY ELLIPTA) 100-62.5-25 MCG/INH oral inhaler Inhale 1 puff into the lungs daily 7/8/2021 at am Yes Clara Valentin PA-C   HUMALOG KWIKPEN 100 UNIT/ML soln Inject 4 units with Glucerna and 8 units with meals plus 1-9 units per sliding scale with meals and before bed. 7/8/2021 at am Yes Rajani Wilder APRN CNP   insulin glargine (LANTUS PEN) 100 UNIT/ML pen Inject 24 units daily, follow directions on AVS for timing 7/7/2021 at pm Yes Rajani Wilder APRN CNP   Melatonin 10 MG CAPS Take 1 capsule by mouth At Bedtime 7/7/2021 at pm Yes Reported, Patient   montelukast (SINGULAIR) 10 MG tablet Take 1 tablet (10 mg) by mouth At Bedtime 7/7/2021 at pm Yes Clara Valentin PA-C   mycophenolate (GENERIC EQUIVALENT) 250 MG capsule Take 4 capsules (1,000 mg) by mouth 2 times daily 7/8/2021 at am Yes Delisa Montgomery MD   nystatin (MYCOSTATIN) 153871 UNIT/ML suspension Swish and swallow 10 mLs (1,000,000 Units) in mouth 4 times daily 7/8/2021 at am Yes Delisa Montgomery MD   pantoprazole (PROTONIX) 40 MG EC tablet Take 1 tablet (40 mg) by mouth 2 times daily 7/8/2021 at am Yes Clara Valentin PA-C   polyethylene glycol (MIRALAX) 17 GM/Dose  powder Take 1 capful by mouth daily as needed for constipation More than a month Yes Reported, Patient   predniSONE (DELTASONE) 10 MG tablet Take 1.5 tablets (15 mg) by mouth every morning  Patient taking differently: every morning Take 20mg by mouth every morning & 5mg every evening 7/8/2021 at am Yes Delisa Montgomery MD   rosuvastatin (CRESTOR) 10 MG tablet Take 1 tablet (10 mg) by mouth daily 7/8/2021 at am Yes Clara Valentin PA-C   senna-docusate (SENOKOT-S/PERICOLACE) 8.6-50 MG tablet Take 2 tablets by mouth daily as needed for constipation More than a month Yes Delisa Montgomery MD   sulfamethoxazole-trimethoprim (BACTRIM) 400-80 MG tablet Take 1 tablet by mouth every other day 7/8/2021 at am Yes Unknown, Entered By History   tacrolimus (GENERIC EQUIVALENT) 0.5 MG capsule Take one capsule (0.5 mg) every am with Four 1 mg (4mg) capsules to equal 4.5 mg in am.  Patient taking differently: Take one capsule (0.5 mg) and four 1 mg (4mg) capsules by mouth to equal 4.5 mg every morning & evening 7/8/2021 at am Yes Delisa Montgomery MD   tacrolimus (GENERIC EQUIVALENT) 1 MG capsule Take 4 capsules (4 mg) by mouth every morning AND 4 capsules (4 mg) every evening.  Patient taking differently: Take four capsules (4 mg) AND one 0.5mg capsule by mouth to equal 4.5mg every morning & evening 7/8/2021 at am Yes Delisa Montgomery MD   traMADol (ULTRAM) 50 MG tablet Take 0.5-1 tablets (25-50 mg) by mouth every 6 hours as needed for moderate pain 7/8/2021 at am Yes Clara Valentin PA-C   valGANciclovir (VALCYTE) 450 MG tablet Take 450 mg by mouth daily 7/8/2021 at am Yes Unknown, Entered By History   ACE/ARB/ARNI NOT PRESCRIBED (INTENTIONAL) ACE & ARB not prescribed due to Worsening renal function on ACE/ARB therapy   Rose Conte MD   blood glucose (ONE TOUCH DELICA) lancing device Lancing device to be used with lancets.   Ricardo Gómez MD   blood glucose monitoring (ONE TOUCH ULTRA 2)  meter device kit Use to test blood sugar four times daily or as directed.   Soraya Marshall APRN CNP   blood glucose VI test strip Use to test blood sugar four times daily or as directed.   Soraya Marshall APRN CNP   Continuous Blood Gluc Transmit (DEXCOM G6 TRANSMITTER) MISC    Reported, Patient   gabapentin (NEURONTIN) 300 MG capsule Take 1 capsule (300 mg) by mouth 2 times daily At 12pm and 8pm  Patient taking differently: Take 600 mg by mouth At Bedtime Take 2 capsules by mouth at bedtime   Rajani Widler APRN CNP   gabapentin (NEURONTIN) 300 MG capsule Take 1 capsule (300 mg) by mouth every morning  Patient taking differently: Take 300 mg by mouth Take 1 capsule by mouth every morning & afternoon   Clara Valentin PA-C   insulin pen needle (32G X 4 MM) 32G X 4 MM miscellaneous Use four pen needles daily or as directed.   Soraya Marshall APRN CNP        Date completed: 07/09/21    Medication history completed by: Neha Ward        I have reviewed student's note above. I confirmed with patient that at home, he was taking tacrolimus 4.5 mg in the morning and 4.5 in the evening, and prednisone 20 mg in the morning and 5 mg in the evening. Note that these dosing are slightly different from the latest orders on Epic.     Marielena Duarte PharmD, BCPS  7/9/2021 5:11 PM     Marielena Duarte PharmD, BCPS  7/9/2021 5:09 PM

## 2021-07-09 NOTE — CONSULTS
[facility], Orthopaedic Surgery Consultation    Jim Willingham MRN# [id number]   Age: 66 year old YOB: 1955     Date of Admission:  7/8/2021    Reason for consult: Left MCP rule out septic arthritis       Requesting physician: Karthik Bentley         Assessment and Plan:   Assessment and Plan:   66 year old male w/ PMH nonischemic cardiomyopathy s/p heart transplant 5/6/2021, postoperative course was complicated by right pleural air leak with prolonged chest tube, paroxysmal atrial fibrillation, COPD, CKDIII, DMII, anemia, recently hospitalized 6/28 for sepsis w/ CAP, treated w/ cefepime, levaquin, azithromycin, and vancomycin w/ 1 of 2 blood cx w/ staph epi 2/2 contaminent; discharged home and re-presented to ED 7/8 for generalized weakness and fatigue w/ lightheadedness while walking; Ortho consulted for L index MCP swelling concerning for gout vs septic arthritis.     7/8 Labs:  WBC 8.3  CRP 87  ESR 72    7/8 Left index MCP aspiration results:  - Cell count: unable to obtain due to insufficient fluid  - Crystals: positive CPPD crystals.  - Gram stain: negative  - Cultures: pending    Aspiration consistent w/ pseudogout. Defer management to primary team. Will continue to monitor cultures.    Cardiology Primary, appreciate cares  Activity: Activity as tolerated  Weight bearing status: WBAT  Bracing/Splinting: none  Imaging: none further needed  Antibiotics: per primary, currently on Bactrim  Diet: okay for diet from ortho perspective    Pain management: per primary; from ortho perspective recommend tylenol, ice, ACE wrap, and elevation above the level of the heart    Follow-up: None needed from ortho perspecitve  Disposition: Per primary    Signed:  This consultation has been discussed with Dr. Slaughter, Attending Physician.    Leonel Junior MD  Orthopaedic Surgery, PGY-4  Pager: [phone]            History of Present Illness:   History obtained from chart review and patient.    Pt is a 66 year old  male w h/o  nonischemic cardiomyopathy s/p heart transplant 5/6/2021, postoperative course was complicated by right pleural air leak with prolonged chest tube, paroxysmal atrial fibrillation, COPD, CKDIII, DMII, anemia, recently hospitalized 6/28 for sepsis w/ CAP, treated w/ cefepime, levaquin, azithromycin, and vancomycin w/ 1 of 2 blood cx w/ staph epi 2/2 contaminent; discharged home and re-presented to ED 7/8 for generalized weakness and fatigue w/ lightheadedness while walking; Ortho consulted for L index MCP swelling concerning for gout vs septic arthritis.     Patient had swelling, erythema, and pain of his MCP develop over the last couple of days. Notes he has had prior pain in this area related to gout.    Denies fevers/chills/HA/CP/SOB/N/V/abd pain.    SHx:  Tobacco: none  EtOH: none  Street drugs: none  Work: former Respiratory Therapist            Past Medical History:     Past Medical History:   Diagnosis Date     Bariatric surgery status 2003     Benign essential hypertension 05/11/2017     Bilateral carpal tunnel syndrome 11/02/2020     Cardiomyopathy, unspecified (H) 05/08/2017     CKD (chronic kidney disease) stage 3, GFR 30-59 ml/min 05/11/2017     COPD (chronic obstructive pulmonary disease) (H) 11/02/2020     Depression 05/11/2017     Diabetes mellitus (H) 1995     Gouty arthropathy, chronic, without tophi 11/02/2020     H/O gastric bypass 05/11/2017     ICD (implantable cardioverter-defibrillator), biventricular, in situ 05/11/2017     LVAD (left ventricular assist device) present (H)      Major depression, recurrent, chronic (H) 11/02/2020     NICM (nonischemic cardiomyopathy) (H)/ EF 20% 05/11/2017    ECHO: LVEDd. 7.66 cm, Restrictive pattern , Severe mitral valve regurgitation     CECILIA (obstructive sleep apnea) 05/11/2017     Paroxysmal atrial fibrillation (H) 05/11/2017     Paroxysmal VT (H) 05/11/2017     Rotator cuff tear arthropathy of both shoulders 11/02/2020     Uncomplicated asthma       Vitamin B12 deficiency (non anemic) 05/11/2017             Past Surgical History:     Past Surgical History:   Procedure Laterality Date     ANESTHESIA CARDIOVERSION N/A 05/11/2020    Procedure: ANESTHESIA, FOR CARDIOVERSION @1100;  Surgeon: GENERIC ANESTHESIA PROVIDER;  Location: [facility]     CV HEART BIOPSY N/A 5/13/2021    Procedure: Heart Biopsy;  Surgeon: Scout Robins MD;  Location: [facility]     CV HEART BIOPSY N/A 5/20/2021    Procedure: Heart Biopsy;  Surgeon: Jeffrey Gibson MD;  Location: [facility]     CV HEART BIOPSY N/A 5/27/2021    Procedure: Heart Biopsy;  Surgeon: Jac Dover MD;  Location: [facility]     CV HEART BIOPSY N/A 6/7/2021    Procedure: CV HEART BIOPSY;  Surgeon: Jeffrey Gibson MD;  Location: [facility]     CV HEART BIOPSY N/A 6/21/2021    Procedure: CV HEART BIOPSY;  Surgeon: Scout Robins MD;  [facility]: [facility]     CV HEART BIOPSY N/A 7/5/2021    Procedure: CV HEART BIOPSY;  Surgeon: Jeffrey Gibson MD;  Location: [facility]     CV RIGHT HEART CATH MEASUREMENTS RECORDED N/A 07/24/2019    Procedure: CV RIGHT HEART CATH;  Surgeon: Renu Sears MD;  Location: [facility]     CV RIGHT HEART CATH MEASUREMENTS RECORDED N/A 08/05/2020    Procedure: CV RIGHT HEART CATH;  Surgeon: Nicola Seth MD;  Location: [facility]     CV RIGHT HEART CATH MEASUREMENTS RECORDED N/A 01/07/2021    Procedure: CV RIGHT HEART CATH;  Surgeon: Jac Dover MD;  [facility]     CV RIGHT HEART CATH MEASUREMENTS RECORDED N/A 02/23/2021    Procedure: Heart Cath Right Heart Cath;  Surgeon: Jeffrey Gibson MD;  [facility]: [facility]     CV RIGHT HEART CATH MEASUREMENTS RECORDED N/A 03/23/2021    Procedure: Heart Cath Right Heart Cath. request for 3/23;   Surgeon: Jeffrey Gibson MD;  Location:  HEART CARDIAC CATH LAB     CV RIGHT HEART CATH MEASUREMENTS RECORDED N/A 5/13/2021    Procedure: Right Heart Cath;  Surgeon: Scout Robins MD;  Location:  HEART CARDIAC CATH LAB     CV RIGHT HEART CATH MEASUREMENTS RECORDED N/A 5/20/2021    Procedure: Right Heart Cath;  Surgeon: Jeffrey Gibson MD;  Location:  HEART CARDIAC CATH LAB     CV RIGHT HEART CATH MEASUREMENTS RECORDED N/A 5/27/2021    Procedure: Right Heart Cath;  Surgeon: Jac Dover MD;  Location:  HEART CARDIAC CATH LAB     CV RIGHT HEART CATH MEASUREMENTS RECORDED N/A 6/7/2021    Procedure: CV RIGHT HEART CATH;  Surgeon: Jeffrey Gibson MD;  Location:  HEART CARDIAC CATH LAB     CV RIGHT HEART CATH MEASUREMENTS RECORDED N/A 6/21/2021    Procedure: CV RIGHT HEART CATH;  Surgeon: Scout Robins MD;  Location:  HEART CARDIAC CATH LAB     CV RIGHT HEART CATH MEASUREMENTS RECORDED N/A 7/5/2021    Procedure: CV RIGHT HEART CATH;  Surgeon: Jeffrey Gibson MD;  Location:  HEART CARDIAC CATH LAB     GI SURGERY  2003    [facility]     INSERT VENTRICULAR ASSIST DEVICE LEFT (HEARTMATE II) N/A 06/19/2017    Procedure: INSERT VENTRICULAR ASSIST DEVICE LEFT (HEARTMATE II);  Median Sternotomy Heartmate II Left Ventricular Assist Device Insertion on Pump Oxygenator;  Surgeon: Ronnie Quigley MD;  Location:  OR     ORTHOPEDIC SURGERY  1994    right knee wired     PICC DOUBLE LUMEN PLACEMENT Right 09/23/2020    5FR PICC DL. Length-43cm (1cm out).     PICC INSERTION - DOUBLE LUMEN Right 05/09/2021    IK/BRACH     RELEASE CARPAL TUNNEL BILATERAL Bilateral 02/18/2021    Procedure: Bilateral carpal tunnel release;  Surgeon: Jermaine Brand MD;  Location:  OR     TRANSPLANT HEART RECIPIENT N/A 05/05/2021    Procedure: Redo median sternotomy, lysis of adhesions, heart transplant recipient, on cardiopulmonary bypass, intraoperative  transesophageal echocardiogram per anesthesia, Implantable Cardioverter Defibrillator (ICD) removal;  Surgeon: Ronnie Quigley MD;  Location:  OR             Social History:     Social History     Socioeconomic History     Marital status:      Spouse name: None     Number of children: None     Years of education: None     Highest education level: None   Occupational History     None   Social Needs     Financial resource strain: None     Food insecurity     Worry: None     Inability: None     Transportation needs     Medical: None     Non-medical: None   Tobacco Use     Smoking status: Former Smoker     Quit date:      Years since quittin.5     Smokeless tobacco: Never Used   Substance and Sexual Activity     Alcohol use: No     Drug use: No     Sexual activity: Yes     Partners: Female   Lifestyle     Physical activity     Days per week: None     Minutes per session: None     Stress: None   Relationships     Social connections     Talks on phone: None     Gets together: None     Attends [employer] service: None     Active member of club or organization: None     Attends meetings of clubs or organizations: None     Relationship status: None     Intimate partner violence     Fear of current or ex partner: None     Emotionally abused: None     Physically abused: None     Forced sexual activity: None   Other Topics Concern     Parent/sibling w/ CABG, MI or angioplasty before 65F 55M? No   Social History Narrative     None             Family History:     Family History   Problem Relation Age of Onset     Cerebrovascular Disease Mother 64     Diabetes Mother      Hypertension Mother      Coronary Artery Disease Father      Diabetes Type 2  Father      Obesity Brother      Obesity Brother      Cerebrovascular Disease Daughter 40              Medications:   reviewed          Allergies:      Allergies   Allergen Reactions     Grass Shortness Of Breath     Ace Inhibitors Cough     Dust Mites Other (See  "Comments)     Asthma     Mold Other (See Comments)     Asthma     Penicillins Other (See Comments)     Unknown - childhood exposure    Tolerated Zosyn 9/18-9/20/2020     Sulfa Drugs Other (See Comments) and Unknown     Unknown childhood reaction            Review of Systems:   Per HPI          Physical Exam:   VITAL SIGNS: /82   Pulse 106   Temp 98.1  F (36.7  C) (Oral)   Resp 18   Ht 1.727 m (5' 8\")   Wt 88.7 kg (195 lb 9.6 oz)   SpO2 99%   BMI 29.74 kg/m      Gen: Awake, appropriate, following commands, NAD.  Resp: Non-labored breathing  CV: Extr wwp, no peripheral cyanosis  MSK:  Focused exam of L hand demonstrates swelling and erythema of the 2nd MCP  TTP 2nd MCP  Painful ROM 2nd MCP w /30 deg of motion  Cap refill < 2 sec          Data:   All pertinent laboratory data reviewed  All pertinent imaging studies reviewed by me.    Recent Labs   Lab Test 07/08/21  1746 07/08/21  1533 07/07/21  0858 07/05/21  0642 07/05/21  0642 06/30/21  0339 06/30/21  0339 06/28/21  2208 06/28/21  2208 05/27/21  0449 05/27/21  0449 05/12/21  0622 05/12/21  0622 05/10/21  0330 05/10/21  0330 10/27/20  1250 10/27/20  1250 10/20/20  1305 10/13/20  1320   HGB 8.9* Canceled, Test credited 9.4*   < > 8.3*   < > 8.9*   < > 11.3*   < > 8.3*   < > 7.3*   < > 8.5*   < > 10.3* 10.2* 10.7*   SED  --   --   --   --   --   --   --   --   --   --   --   --   --   --   --   --  10 8 10   CRP  --  87.0*  --   --   --   --   --   --  27.0*  --  140.0*   < >  --   --   --   --  3.5 11.0* 12.0*   WBC 8.3 Canceled, Test credited 5.6  --  4.0   < > 7.5   < > 5.1   < > 11.7*   < > 11.1*   < > 10.9   < > 5.6 5.9 6.0   INR  --   --   --   --   --   --  1.27*  --   --   --   --   --  1.19*  --  1.33*   < > 1.84* 3.01* 2.64*    Canceled, Test credited 276  --  191   < > 184   < > 243   < > 128*   < > 148*   < > 114*   < > 280 248 208   CR  --  2.37* 2.62*  --  2.25*   < > 2.24*   < > 1.85*   < > 1.59*   < > 1.40*   < > 1.96*   < > 2.04* " 1.80* 1.31*   GLC  --  133* 157*  --  114*   < > 157*   < > 487*   < > 264*   < > 180*   < > 188*   < > 211* 95 96    < > = values in this interval not displayed.       Imaging:  L IF XR demonstrates chondrocalcinosis of the joints; no other abnormalities noted    Procedure Note: Left index finger MCP joint aspiration.  Verbal consent obtained. Correct patient, procedure, and laterality confirmed. Dorsal radial portal planned and marked. Landmarks palpated, joint line appreciated w/ wrist extended gently on a towel bump and axial traction on the index finger proximal phalanx. Skin prepped w/ alcohol. Using a 25G needle, 2cc of 1% lidocaine was injected superficially creating a wheal above the joint.     Skin next prepped w/ chlorhexidine. MCP entered via dorsal radial portal using a 21G needle. A pop was felt indicating entry into the capsule. A single drop bloody straw-colored fluid was aspirated. The needle was redirected multiple times while holding negative pressure but no additional fluid was obtained. Syringe removed and replaced with a saline flush. 2cc of saline injected into the MCP capsule, and was then reaspirated. Needle removed and injection site covered with a soft dressing. Patient tolerated the procedure well and remained neurovascularly intact throughout. Synovial fluid samples promptly labeled and delivered to lab.

## 2021-07-09 NOTE — PROGRESS NOTES
"   07/09/21 1509   Quick Adds   Type of Visit Initial PT Evaluation   Living Environment   People in home spouse;grandchild(lidia)   Current Living Arrangements house   Home Accessibility stairs to enter home;stairs within home   Number of Stairs, Main Entrance 1   Stair Railings, Main Entrance none   Number of Stairs, Within Home, Primary 8   Stair Railings, Within Home, Primary   (1 HR.)   Transportation Anticipated family or friend will provide   Living Environment Comments Pt has tub/shower with chair. Pt uses 4WW at home. Spouse has been providing assist with all I/ADLs.   Self-Care   Usual Activity Tolerance moderate   Current Activity Tolerance poor   Regular Exercise No   Equipment Currently Used at Home walker, rolling;shower chair   Disability/Function   Hearing Difficulty or Deaf no   Wear Glasses or Blind yes   Vision Management Glasses.   Fall history within last six months yes   Number of times patient has fallen within last six months 8   Change in Functional Status Since Onset of Current Illness/Injury yes   General Information   Onset of Illness/Injury or Date of Surgery 07/08/21   Referring Physician Karthik Bentley MD    Patient/Family Therapy Goals Statement (PT) Pt would like to get stronger.   Pertinent History of Current Problem (include personal factors and/or comorbidities that impact the POC) Per chart \"Jim Willingham is a 66 year old male with a history of NICM s/p OHT (5/6/21) postoperative course was complicated by right pleural air leak with prolonged chest tube, paroxysmal atrial fibrillation, COPD, CKDIII, DMII, anemia who presents to the hospital for generalized weakness and fatigue.\"   Existing Precautions/Restrictions fall   General Observations Activity - as tolerated.   Cognition   Orientation Status (Cognition) oriented x 4   Affect/Mental Status (Cognition) WNL   Follows Commands (Cognition) WNL   Pain Assessment   Patient Currently in Pain Yes, see Vital Sign flowsheet  (L hand.) "   Posture    Posture Forward head position;Protracted shoulders   Range of Motion (ROM)   ROM Comment B LE ROM WFL.   Strength   Strength Comments B LE strength at least >3/5; gross functional weakness/deconditioning present.   Bed Mobility   Comment (Bed Mobility) Pt completes supine <> sit IND.   Transfers   Transfer Safety Comments Pt transfers sit <> stand with CGA-Matthew.   Gait/Stairs (Locomotion)   Comment (Gait/Stairs) Pt ambulates x4 bouts of ~15-20ft with FWW (L UE platform attachment), CGA, and WC follow. Pt with slow gait speed and decreased step length/height B. Pt requires Matthew for walker management/navigation.   Balance   Balance Comments IND sitting balance at EOB. Pt requires walker with L UE platform and SBA for static standing balance. No overt LOB with ambulation this date though requires walker with L UE platform and CGA and slightly unsteady jered when fatigued.   Clinical Impression   Criteria for Skilled Therapeutic Intervention yes, treatment indicated   PT Diagnosis (PT) Impaired functional mobility   Influenced by the following impairments Weakness, impaired balance, decreased activity tolerance   Functional limitations due to impairments Bed mobility, transfers, gait, stairs, functional endurance   Clinical Presentation Evolving/Changing   Clinical Presentation Rationale Clinical judgement   Clinical Decision Making (Complexity) moderate complexity   Therapy Frequency (PT) 6x/week   Predicted Duration of Therapy Intervention (days/wks) 1 week   Planned Therapy Interventions (PT) balance training;bed mobility training;gait training;home exercise program;patient/family education;postural re-education;stair training;strengthening;transfer training   Risk & Benefits of therapy have been explained evaluation/treatment results reviewed;care plan/treatment goals reviewed;risks/benefits reviewed;patient   PT Discharge Planning    PT Discharge Recommendation (DC Rec) Transitional Care Facility;Acute  Rehab Center-Motivated patient will benefit from intensive, interdisciplinary therapy.  Anticipate will be able to tolerate 3 hours of therapy per day   PT Rationale for DC Rec Pt below functional baseline and will require continued skilled therapy to progress strength, balance, activity tolerance, and mobility safety/IND. At this time recommend TCU - pending improved activity tolerance would be appropriate for ARU.   Total Evaluation Time   Total Evaluation Time (Minutes) 8

## 2021-07-09 NOTE — PROGRESS NOTES
[facility]    Cardiology Progress Note- Cards 2        Date of Admission:  7/8/2021     Assessment & Plan: SL   Jim Willingham is a 66 year old male with a history of NICM s/p OHT (5/6/21) postoperative course was complicated by right pleural air leak with prolonged chest tube, paroxysmal atrial fibrillation, COPD, CKDIII, DMII, anemia who presents to the hospital for generalized weakness, fatigue and multiple falls.       Moderate pleural effusion  Prior R pleural air leak s/p prolonged chest tube  Chronic immunosuppression  No pulmonary symptoms, denies cough or increased sputum production. Has remained afebrile, WBC 8.3, and lack of other symptoms. However, given immunocompromised status, moderate pleural effusion is concerning.  -Thoracentesis with pleural fluid analysis    Generalized weakness  Mechanical falls  3 day history of generalized fatigue after discharge on 7/5/21 after treatment for CAP and sepsis. With no other symptoms, weakness is likely 2/2 deconditioning, possible malnutrition  -Check TSH/free T4 reflux  - PT/OT consult  - Nutrition consult    MCP pseudogout  Xray of L hand with chondrocalcinosis and capsular calcification at the second and third MCP joints. Synovial fluid analysis negative for gram stain but cultures pending  - Started on anankira 100 mg every other day for 3 doses   - Continue PTA allopurinol      NICM s/p OCT (5/6/21)  Last biopsy (6/21/21):   - Acute cellular rejection: No rejection, Grade 0R (ISHLT 2004)   - Antibody-mediated rejection: No morphologic evidence of antibody-mediated rejection   - C3d and C4d are negative     Volume status/diuretics: hyper-euvolemic. PTA bumex 2 qAM, 1 in afternoon  Immunosuppression:   - Continue prednisone taper 15qAM now  - Continue cellcept 1000 BID  - Continue tacrolimus 5mg qAM, 4mg qPM. Level ordered (goal 10-12 initially but considering decreasing to 8-10)     Serostatus: CMV:  D+/R+, EBV: D+/R+, Toxo D-/R-  Prophylaxis: Bactrim, Nystatin and Valcyte     - Continue PTA ASA 81 mg  - Continue PTA rosuvastatin 10 mg daily          CKD Stage III  - Cr 2.37 from discharge Cr 2.25     DM II  - PTA insulin regimen      Depression  - PTA Wellbutrin 75 BID  - PTA Amitriptyline 25 at bedtime     COPD   - PTA Monetlukast, Trelegy Ellipta, Albuterol        Diet: Cardiac diet  DVT Prophylaxis: Enoxaparin (Lovenox) SQ  Russell Catheter: Not present  Code Status: Full      Disposition Plan   Expected discharge: 2 - 3 days, recommended to workup pleural effusion      Entered: Kamran Hong MD 07/09/2021, 5:43 PM       The patient's care was discussed with the Attending Physician, Dr. Montgomery.    Kamran Hong MD   Internal Medicine PGY2  [facility]    ______________________________________________________________________    Interval History   No major overnight events.     Reports ongoing L hand pain. Denies chest pain, SOB or diarrhea. Endorses that feels weak and gets dizzy with getting off bed.    Data reviewed today: I reviewed all medications, new labs and imaging results over the last 24 hours.    Physical Exam   Vital Signs: Temp: 97.9  F (36.6  C) Temp src: Oral BP: 119/89 Pulse: 103   Resp: 18 SpO2: 98 % O2 Device: None (Room air)    Weight: 189 lbs 8 oz    Constitutional:  no apparent distress, and appears stated age  Eyes: sclera clear, conjunctiva normal  ENT:  oral pharynx with moist mucous membranes  Respiratory: No increased WOB, good air exchange, clear to auscultation bilaterally, no crackles or wheezing  Cardiovascular: Normal apical impulse, regular rate and rhythm, normal S1 and S2, no S3 or S4, and no murmur noted  GI: Normal bowel sounds, soft, non-distended, non-tender, no masses palpated  Musculoskeletal: no lower extremity pitting edema present. There is no redness, warmth, or swelling of the joints except for the MCP joint in his  left index finger that is tender to palpation and warm. Motor strength is 5 out of 5 all extremities bilaterally  Neurologic: Awake, alert, oriented to name, place and time.  Cranial nerves II-XII are grossly intact.  Motor is 5 out of 5 bilaterally.  Sensory is intact.

## 2021-07-09 NOTE — PROGRESS NOTES
Transplant Social Work Services Progress Note      Date of Initial Social Work Evaluation: 7/8/2021  Collaborated with: [facility] admissions, OT, Dr. Montgomery, pt and his wife, Cate    Data: Pt is s/p heart transplant on 5/6/2021. Pt admitted for ongoing weakness and falls at home. Discussed with Dr. Montgomery and pt likely not ready for discharge until early next week. [facility] continuing to assess and at this time anticipate TCU level of care.   Reviewed with pt and wife TCU recommendation with pt and wife and they are in agreement. We discussed that because pt is not vaccinated he will need to quarantine for 14 days at TCU.      Intervention: Supportive Visit, Discharge Planning   Assessment: Pt in better spirits today. He continues to be in agreement with rehab placement as he has not really been able to regain his strength after transplant.   Education provided by SW: Ongoing Social Work support   Plan:    Discharge Plans in Progress: [facility] admissions following--anticipate TCU     Barriers to d/c plan: Medical     Follow up Plan: SW to f/u next week for ongoing support and discharge planning.

## 2021-07-09 NOTE — PROGRESS NOTES
"D:Assisted with getting to side of bed.   At which time,   Reported feeling light headed and dizzy,  Which  Subside after a few minutes.   Up to bathroom with  OT for eval of  ADL'.  Used walker with right arm rest for control.   After finishing up,  Agreed to sit up  In chair and reported dizziness had passed.   Complained of pain in the left index finger.   Has already  Been medicated with plain tylenol and ultram 50mg.   Cards 2 MD notified.   Per MD ortho was \"notified\" and patient was informed of plan for \"intra articular injection(steroid injection).   Patient was agreeable to wait.   Rhythm is   No ectopy.  Bp 105/81  MAP 89 RR 20  O2 sat 97% on room air.   No edema.   No shortness of breath.   Was slightly winded with walking a short distance with tremors.   Reports feeling \"weak\" and had declined to order breakfast.   Agreed with encouragement.  Report having \"no taste\".   Ate a bowl of oatmeal and fresh fruit.      A:Very weak.   Actively participating in therapies.   Increased left hand and wrist pain with activity.   States,   \"it hurts bad when I use my left hand\".   No acute distress.   Breathing easy and maintaining normal O2  sats on room air.   Rhythm is stable..  No fever.  Vital signs are stable.   Condition is stable.    P;Calorie count.   Regular diet.   Up with one assist only and walker.   Remind to call for help.  Continue to assess tolerance to activity.   Medicate prn pain.  Monitor rhythm,  Temp and vital signs and O2 sats.   Notify MD with any acute changes.  "

## 2021-07-09 NOTE — PROGRESS NOTES
Admission          7/8/2021  8:30 PM  -----------------------------------------------------------  Diagnosis: weakness and fatigue      Admitted from: ED   Family Aware of Admission: yes  Belongings: remain with pt (cell phone with , shoes, clothing, briefcase, headphones, lab book, ring)   Admission Profile: Complete  Teaching: Orientation to unit, call don't fall, use of call light, meal times, visiting hours,  when to call for the RN (angina/sob/dizzyness, etc.), and enforced importance of safety.  Access: [facility]  Telemetry: Placed on pt  Ht./Wt.: in progress; pt feeling too weak at the moment   2 RN skin assessment: completed with Tori CLARK RN. Scar from healed sternal incision on chest, dry flaky skin, and scabs present on knee and R side of back. No other significant findings.     Temp:  [98.1  F (36.7  C)] 98.1  F (36.7  C)  Pulse:  [105-112] 112  Resp:  [16-18] 16  BP: ()/(71-82) 96/72  SpO2:  [96 %-100 %] 96 %

## 2021-07-09 NOTE — DISCHARGE INSTRUCTIONS
________________________________________________________  Discharge RN please fax discharge orders to home care agency:  [facility]  [address] [place] [address] [place] [address]  Phone [phone]   Fax [phone]   ________________________________________________________

## 2021-07-10 ENCOUNTER — APPOINTMENT (OUTPATIENT)
Dept: GENERAL RADIOLOGY | Facility: CLINIC | Age: 66
DRG: 186 | End: 2021-07-10
Attending: ORTHOPAEDIC SURGERY
Payer: COMMERCIAL

## 2021-07-10 LAB
AMYLASE FLD-CCNC: 29 U/L
ANION GAP SERPL CALCULATED.3IONS-SCNC: 5 MMOL/L (ref 3–14)
ANION GAP SERPL CALCULATED.3IONS-SCNC: 6 MMOL/L (ref 3–14)
APPEARANCE FLD: NORMAL
BACTERIA SPEC CULT: NO GROWTH
BUN SERPL-MCNC: 44 MG/DL (ref 7–30)
BUN SERPL-MCNC: 46 MG/DL (ref 7–30)
CALCIUM SERPL-MCNC: 7.8 MG/DL (ref 8.5–10.1)
CALCIUM SERPL-MCNC: 8.1 MG/DL (ref 8.5–10.1)
CHLORIDE SERPL-SCNC: 103 MMOL/L (ref 94–109)
CHLORIDE SERPL-SCNC: 105 MMOL/L (ref 94–109)
CO2 SERPL-SCNC: 23 MMOL/L (ref 20–32)
CO2 SERPL-SCNC: 24 MMOL/L (ref 20–32)
COLOR FLD: NORMAL
CREAT SERPL-MCNC: 2.4 MG/DL (ref 0.66–1.25)
CREAT SERPL-MCNC: 2.48 MG/DL (ref 0.66–1.25)
ERYTHROCYTE [DISTWIDTH] IN BLOOD BY AUTOMATED COUNT: 15.9 % (ref 10–15)
GFR SERPL CREATININE-BSD FRML MDRD: 26 ML/MIN/{1.73_M2}
GFR SERPL CREATININE-BSD FRML MDRD: 27 ML/MIN/{1.73_M2}
GLUCOSE BLDC GLUCOMTR-MCNC: 386 MG/DL (ref 70–99)
GLUCOSE FLD-MCNC: 102 MG/DL
GLUCOSE SERPL-MCNC: 258 MG/DL (ref 70–99)
GLUCOSE SERPL-MCNC: 77 MG/DL (ref 70–99)
HCT VFR BLD AUTO: 26 % (ref 40–53)
HGB BLD-MCNC: 8 G/DL (ref 13.3–17.7)
INR PPP: 1.06 (ref 0.86–1.14)
LDH FLD L TO P-CCNC: 458 U/L
LDH SERPL L TO P-CCNC: 220 U/L (ref 85–227)
LYMPHOCYTES NFR FLD MANUAL: 28 %
Lab: NORMAL
MAGNESIUM SERPL-MCNC: 1.8 MG/DL (ref 1.6–2.3)
MCH RBC QN AUTO: 28.6 PG (ref 26.5–33)
MCHC RBC AUTO-ENTMCNC: 30.8 G/DL (ref 31.5–36.5)
MCV RBC AUTO: 93 FL (ref 78–100)
MONOS+MACROS NFR FLD MANUAL: 20 %
NEUTS BAND NFR FLD MANUAL: 52 %
PH FLD: <6.8 PH
PLATELET # BLD AUTO: 314 10E9/L (ref 150–450)
POTASSIUM SERPL-SCNC: 4.6 MMOL/L (ref 3.4–5.3)
POTASSIUM SERPL-SCNC: 5.6 MMOL/L (ref 3.4–5.3)
POTASSIUM SERPL-SCNC: 5.8 MMOL/L (ref 3.4–5.3)
PROT FLD-MCNC: 2.7 G/DL
PROT SERPL-MCNC: 5.3 G/DL (ref 6.8–8.8)
RBC # BLD AUTO: 2.8 10E12/L (ref 4.4–5.9)
SODIUM SERPL-SCNC: 132 MMOL/L (ref 133–144)
SODIUM SERPL-SCNC: 135 MMOL/L (ref 133–144)
SPECIMEN SOURCE FLD: NORMAL
SPECIMEN SOURCE: NORMAL
WBC # BLD AUTO: 8.3 10E9/L (ref 4–11)
WBC # FLD AUTO: 384 /UL

## 2021-07-10 PROCEDURE — 999N001017 HC STATISTIC GLUCOSE BY METER IP

## 2021-07-10 PROCEDURE — 250N000012 HC RX MED GY IP 250 OP 636 PS 637: Performed by: PHYSICIAN ASSISTANT

## 2021-07-10 PROCEDURE — 80048 BASIC METABOLIC PNL TOTAL CA: CPT

## 2021-07-10 PROCEDURE — 83615 LACTATE (LD) (LDH) ENZYME: CPT | Performed by: STUDENT IN AN ORGANIZED HEALTH CARE EDUCATION/TRAINING PROGRAM

## 2021-07-10 PROCEDURE — 214N000001 HC R&B CCU UMMC

## 2021-07-10 PROCEDURE — 250N000013 HC RX MED GY IP 250 OP 250 PS 637: Performed by: STUDENT IN AN ORGANIZED HEALTH CARE EDUCATION/TRAINING PROGRAM

## 2021-07-10 PROCEDURE — 85027 COMPLETE CBC AUTOMATED: CPT

## 2021-07-10 PROCEDURE — 80048 BASIC METABOLIC PNL TOTAL CA: CPT | Performed by: PHYSICIAN ASSISTANT

## 2021-07-10 PROCEDURE — 89051 BODY FLUID CELL COUNT: CPT | Performed by: STUDENT IN AN ORGANIZED HEALTH CARE EDUCATION/TRAINING PROGRAM

## 2021-07-10 PROCEDURE — 99232 SBSQ HOSP IP/OBS MODERATE 35: CPT | Mod: 24 | Performed by: PHYSICIAN ASSISTANT

## 2021-07-10 PROCEDURE — 36415 COLL VENOUS BLD VENIPUNCTURE: CPT | Performed by: PHYSICIAN ASSISTANT

## 2021-07-10 PROCEDURE — 36415 COLL VENOUS BLD VENIPUNCTURE: CPT | Performed by: STUDENT IN AN ORGANIZED HEALTH CARE EDUCATION/TRAINING PROGRAM

## 2021-07-10 PROCEDURE — 36415 COLL VENOUS BLD VENIPUNCTURE: CPT

## 2021-07-10 PROCEDURE — 82150 ASSAY OF AMYLASE: CPT | Performed by: STUDENT IN AN ORGANIZED HEALTH CARE EDUCATION/TRAINING PROGRAM

## 2021-07-10 PROCEDURE — 0W993ZX DRAINAGE OF RIGHT PLEURAL CAVITY, PERCUTANEOUS APPROACH, DIAGNOSTIC: ICD-10-PCS | Performed by: ORTHOPAEDIC SURGERY

## 2021-07-10 PROCEDURE — 85610 PROTHROMBIN TIME: CPT | Performed by: PHYSICIAN ASSISTANT

## 2021-07-10 PROCEDURE — 84132 ASSAY OF SERUM POTASSIUM: CPT | Performed by: STUDENT IN AN ORGANIZED HEALTH CARE EDUCATION/TRAINING PROGRAM

## 2021-07-10 PROCEDURE — 82945 GLUCOSE OTHER FLUID: CPT | Performed by: STUDENT IN AN ORGANIZED HEALTH CARE EDUCATION/TRAINING PROGRAM

## 2021-07-10 PROCEDURE — 250N000012 HC RX MED GY IP 250 OP 636 PS 637

## 2021-07-10 PROCEDURE — 83986 ASSAY PH BODY FLUID NOS: CPT | Performed by: STUDENT IN AN ORGANIZED HEALTH CARE EDUCATION/TRAINING PROGRAM

## 2021-07-10 PROCEDURE — 250N000013 HC RX MED GY IP 250 OP 250 PS 637: Performed by: INTERNAL MEDICINE

## 2021-07-10 PROCEDURE — 32555 ASPIRATE PLEURA W/ IMAGING: CPT | Performed by: ORTHOPAEDIC SURGERY

## 2021-07-10 PROCEDURE — 87205 SMEAR GRAM STAIN: CPT | Performed by: PHYSICIAN ASSISTANT

## 2021-07-10 PROCEDURE — 71045 X-RAY EXAM CHEST 1 VIEW: CPT

## 2021-07-10 PROCEDURE — 87102 FUNGUS ISOLATION CULTURE: CPT | Performed by: INTERNAL MEDICINE

## 2021-07-10 PROCEDURE — 83735 ASSAY OF MAGNESIUM: CPT

## 2021-07-10 PROCEDURE — 84155 ASSAY OF PROTEIN SERUM: CPT | Performed by: STUDENT IN AN ORGANIZED HEALTH CARE EDUCATION/TRAINING PROGRAM

## 2021-07-10 PROCEDURE — 71045 X-RAY EXAM CHEST 1 VIEW: CPT | Mod: 26 | Performed by: RADIOLOGY

## 2021-07-10 PROCEDURE — 84157 ASSAY OF PROTEIN OTHER: CPT | Performed by: STUDENT IN AN ORGANIZED HEALTH CARE EDUCATION/TRAINING PROGRAM

## 2021-07-10 PROCEDURE — 250N000013 HC RX MED GY IP 250 OP 250 PS 637

## 2021-07-10 PROCEDURE — 250N000011 HC RX IP 250 OP 636: Performed by: PHYSICIAN ASSISTANT

## 2021-07-10 RX ORDER — ALLOPURINOL 100 MG/1
200 TABLET ORAL DAILY
Status: DISCONTINUED | OUTPATIENT
Start: 2021-07-11 | End: 2021-07-18

## 2021-07-10 RX ORDER — BUMETANIDE 0.25 MG/ML
2 INJECTION INTRAMUSCULAR; INTRAVENOUS 2 TIMES DAILY
Status: DISCONTINUED | OUTPATIENT
Start: 2021-07-10 | End: 2021-07-11

## 2021-07-10 RX ORDER — PREDNISONE 10 MG/1
10 TABLET ORAL DAILY
Status: DISCONTINUED | OUTPATIENT
Start: 2021-07-10 | End: 2021-07-11

## 2021-07-10 RX ORDER — TACROLIMUS 1 MG/1
4 CAPSULE ORAL
Status: DISCONTINUED | OUTPATIENT
Start: 2021-07-10 | End: 2021-07-19 | Stop reason: HOSPADM

## 2021-07-10 RX ADMIN — GABAPENTIN 300 MG: 300 CAPSULE ORAL at 12:27

## 2021-07-10 RX ADMIN — SULFAMETHOXAZOLE AND TRIMETHOPRIM 1 TABLET: 400; 80 TABLET ORAL at 08:43

## 2021-07-10 RX ADMIN — FLUTICASONE FUROATE AND VILANTEROL TRIFENATATE 1 PUFF: 100; 25 POWDER RESPIRATORY (INHALATION) at 08:41

## 2021-07-10 RX ADMIN — NYSTATIN 1000000 UNITS: 500000 SUSPENSION ORAL at 12:27

## 2021-07-10 RX ADMIN — NYSTATIN 1000000 UNITS: 500000 SUSPENSION ORAL at 16:15

## 2021-07-10 RX ADMIN — TACROLIMUS 4 MG: 1 CAPSULE ORAL at 18:09

## 2021-07-10 RX ADMIN — Medication 10 MG: at 22:10

## 2021-07-10 RX ADMIN — BUMETANIDE 2 MG: 0.25 INJECTION, SOLUTION INTRAMUSCULAR; INTRAVENOUS at 20:25

## 2021-07-10 RX ADMIN — ACETAMINOPHEN 650 MG: 325 TABLET, FILM COATED ORAL at 16:15

## 2021-07-10 RX ADMIN — BUMETANIDE 2 MG: 0.25 INJECTION, SOLUTION INTRAMUSCULAR; INTRAVENOUS at 08:44

## 2021-07-10 RX ADMIN — INSULIN GLARGINE 24 UNITS: 100 INJECTION, SOLUTION SUBCUTANEOUS at 22:11

## 2021-07-10 RX ADMIN — BUPROPION HYDROCHLORIDE 75 MG: 75 TABLET, FILM COATED ORAL at 08:42

## 2021-07-10 RX ADMIN — Medication 50 MG: at 16:15

## 2021-07-10 RX ADMIN — ASPIRIN 81 MG CHEWABLE TABLET 81 MG: 81 TABLET CHEWABLE at 08:42

## 2021-07-10 RX ADMIN — VALGANCICLOVIR 450 MG: 450 TABLET, FILM COATED ORAL at 08:43

## 2021-07-10 RX ADMIN — ALLOPURINOL 300 MG: 300 TABLET ORAL at 08:43

## 2021-07-10 RX ADMIN — UMECLIDINIUM 1 PUFF: 62.5 AEROSOL, POWDER ORAL at 08:41

## 2021-07-10 RX ADMIN — NYSTATIN 1000000 UNITS: 500000 SUSPENSION ORAL at 08:44

## 2021-07-10 RX ADMIN — MYCOPHENOLATE MOFETIL 1000 MG: 250 CAPSULE ORAL at 08:42

## 2021-07-10 RX ADMIN — GABAPENTIN 300 MG: 300 CAPSULE ORAL at 20:25

## 2021-07-10 RX ADMIN — ACETAMINOPHEN 650 MG: 325 TABLET, FILM COATED ORAL at 22:10

## 2021-07-10 RX ADMIN — ROSUVASTATIN CALCIUM 10 MG: 10 TABLET, FILM COATED ORAL at 08:42

## 2021-07-10 RX ADMIN — PANTOPRAZOLE SODIUM 40 MG: 40 TABLET, DELAYED RELEASE ORAL at 08:43

## 2021-07-10 RX ADMIN — MINERAL OIL AND PETROLATUM 1 G: 150; 830 OINTMENT OPHTHALMIC at 22:10

## 2021-07-10 RX ADMIN — TACROLIMUS 5 MG: 5 CAPSULE ORAL at 08:42

## 2021-07-10 RX ADMIN — PANTOPRAZOLE SODIUM 40 MG: 40 TABLET, DELAYED RELEASE ORAL at 20:25

## 2021-07-10 RX ADMIN — Medication 50 MG: at 22:09

## 2021-07-10 RX ADMIN — MONTELUKAST 10 MG: 10 TABLET, FILM COATED ORAL at 22:10

## 2021-07-10 RX ADMIN — GABAPENTIN 300 MG: 300 CAPSULE ORAL at 08:43

## 2021-07-10 RX ADMIN — Medication 50 MG: at 09:20

## 2021-07-10 RX ADMIN — AMITRIPTYLINE HYDROCHLORIDE 25 MG: 25 TABLET, FILM COATED ORAL at 22:10

## 2021-07-10 RX ADMIN — BUPROPION HYDROCHLORIDE 75 MG: 75 TABLET, FILM COATED ORAL at 20:25

## 2021-07-10 RX ADMIN — PREDNISONE 10 MG: 10 TABLET ORAL at 18:09

## 2021-07-10 RX ADMIN — ACETAMINOPHEN 650 MG: 325 TABLET, FILM COATED ORAL at 09:20

## 2021-07-10 RX ADMIN — NYSTATIN 1000000 UNITS: 500000 SUSPENSION ORAL at 20:24

## 2021-07-10 RX ADMIN — PREDNISONE 15 MG: 5 TABLET ORAL at 08:43

## 2021-07-10 RX ADMIN — MYCOPHENOLATE MOFETIL 1000 MG: 250 CAPSULE ORAL at 20:24

## 2021-07-10 ASSESSMENT — ACTIVITIES OF DAILY LIVING (ADL)
ADLS_ACUITY_SCORE: 15

## 2021-07-10 ASSESSMENT — MIFFLIN-ST. JEOR: SCORE: 1639.49

## 2021-07-10 NOTE — PROGRESS NOTES
Calorie Count  Intake recorded for: 9/7  Total Kcals: 1289 Total Protein: 28g  Kcals from Hospital Food: 1289   Protein: 28g  Kcals from Outside Food (average):0  Protein: 0g  # Meals Ordered from Kitchen: 4 meals  # Meals Recorded: 2 meals (First - 100% oatmeal w/ brown sugar, cantaloupe)      (100% 2.5 caesar dressing packets - from kitchen)   # Supplements Recorded: 100% of 2 Glucerna    Note: Pt made note of consuming chicken and possibly salad to go w/ caesar dressing packets but it was not ordered from the  kitchen and there was no specified amount so I could not calculate calories/protein.

## 2021-07-10 NOTE — PROGRESS NOTES
Brief Ortho Note    Paged by primary team regarding index MCP fluid culture that just resulted with GPCs in clusters in broth only on day 2. Gram stain had been negative, crystals positive for CPPD crystals. Cell count unable to be obtained due to dilution with saline for the aspiration. Primary team spoke with the ID service who feels that this is a contaminant and would not recommend starting antibiotics at this time, with plans to wait for speciation of the GPCs. I discussed this with Ortho staff, who are in agreement with the above plan.     Assessment and plan discussed with staff surgeon, Dr. Childress.    Siobhan Lozano MD  Orthopaedic Surgery PGY4  Pager: [phone]

## 2021-07-10 NOTE — PROGRESS NOTES
"D:NPO until after lunch for right thoracentesis in progress now.   Up out of bed this a.m. with  Use of transfer belt and walker to bathroom for provider assist ADL's.   Actively involved in self care.  Complained of tremors getting \"worse and making it difficult to walk and pain in my left hand\".  Patient verbalized this  To  Cards 2 NP.   Who addressed patient's concerns.   Medicated for left hand and wrist \"aching\" and increased pain with \"movement\".   Denies shortness of breath.  Lungs are diminished in the lower lobes bilaterally.  O2 sat 97% on room air.  Rhythm is SR/ST with no ectopy.  Bp 111//74  MAP 87-74,  Temp 97.8.    A:Tolerating activity.  Some fatigue.  Able to stand and transfer with one assist. Stand by while ambulating with walker and transfer belt on.   Moderate left hand pain.   Adequately controlled with   Po pain medication.   No fever.   Rhythm and vital signs are stable.  Breathing easy  And maintaining normal O2 sats on room air.  Condition is stable.  No acute changes.    P:Continue instruct to call for help.  Bed  Alarm during the night.  Continue use safety equipment with  Transfers and ambulation.   Continue to monitor temp, rhythm and vital signs and O2 sats.  Medicate prn left hand pain.  "

## 2021-07-10 NOTE — PROGRESS NOTES
[facility]   Cardiology II Service / Advanced Heart Failure  Daily Progress Note      Patient: Jim Willingham  MRN: [id number]  Admission Date: 7/8/2021  Hospital Day # 2    Assessment and Plan:    Jim Willingham is a 66 year old male with a history of NICM s/p OHT (5/6/21) postoperative course was complicated by right pleural air leak with prolonged chest tube, paroxysmal atrial fibrillation, COPD, CKDIII, DMII, anemia who presents to the hospital for generalized weakness, fatigue and multiple falls.    Today's Plan:  - IV bumex 2 mg BID  - BID labs while diuresing  - Thoracentesis today  - Holding lovenox, will replace with heparin once restarting ppx given cr  - OT/PT    # Status post OHT on 5/6/21, history of NICM c/b right pleural air leak w/prlonged CT, paroxysmal a fib and more recently sepsis 2/2/ CAP  # Chronic immunosuppression  * TTE 6/29/21 with normal graft function, LVEF of 60-65%  * RHC 7/5/21: Ra 18, PA 45/30/35, PCW 28, Kee CO/CI 5.4/2.7    Graft Function:   --Volume: hypervolemic, PTA bumex 2/1, increasing   --BP: At goal  --HR: 90s-100s, at goal    Immunosuppression:   --prednisone 20 mg in the morning, 5 mg at night at home, clarifying recent changes  --Cellcept 1000 mg BID (decreased for infections)  --tacrolimus 5 mg in the AM and 4.5 mg in the PM (goal 10-12), level 12.1 on 7/9, no dose changes    Serostatus: CMV: D+/R+, EBV: D+/R+, Toxo D-/R-    Prophylaxis:   --CAV: aspirin 81 mg and rosuvastatin 10 mg daily  --Thrush: Nystatin   --PCP: Bactrim single strength daily, will discuss today given renal dysfunction  --GI: Protonix   --Osteoporosis: calcium/vitamin D   --CMV: D+/R+, Valcyte 450 mg daily x 3 mos (D+/R+ or D-/R+)     Moderate pleural effusion  Prior R pleural air leak s/p prolonged chest tube  Recent CAP, s/p cefepime -> levaquin course ended 7/4  No pulmonary symptoms, denies cough or increased sputum production. Has remained afebrile, WBC 8.3, and lack of other  symptoms. However, given immunocompromised status, moderate pleural effusion is concerning.  - Thoracentesis with pleural fluid analysis, planned for 7/10 afternoon  - Needs f/u chest CT 7/14-7/14 given recent pna to r/o fungal pna  - Holding off on abx for now     Generalized weakness  Mechanical falls  3 day history of generalized fatigue after discharge on 7/5/21 after treatment for CAP and sepsis. TSH WNL.With no other symptoms, weakness is likely 2/2 hypervolemia, deconditioning, possible malnutrition  - PT/OT consult  - Nutrition consult     MCP pseudogout  Xray of L hand with chondrocalcinosis and capsular calcification at the second and third MCP joints. Synovial fluid analysis negative for gram stain (+ WBC, but no organisms, pseudogout crystals) cultures pending  - F/u synovial fluid culture, NGTD  - Started on anankira 100 mg every other day for 3 doses   - Continue PTA allopurinol     CKD Stage III  - Cr 2.37 from discharge Cr 2.25     DM II  - PTA insulin regimen      Depression  - PTA Wellbutrin 75 BID  - PTA Amitriptyline 25 at bedtime     COPD   - PTA Monetlukast, Trelegy Ellipta, Albuterol    Diet: Cardiac diet  DVT Prophylaxis: Holding for thoracentesis  Russell Catheter: Not present  Code Status: Full    Didi Butler PA-C  Advanced Heart Failure/Cardiology II Service  Pager [phone] [facility] [id number]    ================================================================    Subjective/24-Hr Events:   Last 24 hr care team notes reviewed. Feeling about the same today. Still feeling weak. Appetite is low. Breathing feels fine. Finger joints is swollen, but improving. Able to move it more today. No lightheadedness, dizziness, presyncope or syncope. No chest pain. No abdominal pain. No dysuria. No fevers or chills, no night sweats.    ROS:  4 point ROS including respiratory, CV, GI and  (other than that noted in the HPI) is negative.     Medications: Reviewed in EPIC.     Physical Exam:   /75 (BP  "Location: Right arm)   Pulse 98   Temp 97.8  F (36.6  C) (Oral)   Resp 18   Ht 1.727 m (5' 8\")   Wt 87.5 kg (192 lb 14.4 oz)   SpO2 98%   BMI 29.33 kg/m      GENERAL: Appears comfortable, in no distress.  HEENT: Eye symmetrical, no discharge or icterus bilaterally. Mucous membranes moist and without lesions.  NECK: Supple, JVD >14.   CV: RRR, +S1S2, no murmur, rub, or gallop.   RESPIRATORY: Respirations regular, even, and unlabored. Lungs CTA throughout.    GI: Soft and non distended with normoactive bowel sounds present in all quadrants. No tenderness, rebound, guarding.   EXTREMITIES: No peripheral edema. 2+ bilateral pedal pulses. All extremities are warm and well perfused  NEUROLOGIC: Alert and interacting appropriatly. No focal deficits.   MUSCULOSKELETAL: No joint swelling or tenderness.   SKIN: No jaundice. No rashes or lesions.     Labs:  CMP  Recent Labs   Lab 07/10/21  0624 07/09/21  0632 07/08/21  1533 07/07/21  0858 07/05/21  0642 07/04/21  0721    136 134 133 132* 134   POTASSIUM 4.6 4.8 4.6 4.7 4.8 4.8   CHLORIDE 105 108 104 106 105 107   CO2 23 23 22 21 22 23   ANIONGAP 6 5 7 6 5 5   GLC 77 122* 133* 157* 114* 132*   BUN 46* 41* 37* 37* 30 34*   CR 2.40* 2.33* 2.37* 2.62* 2.25* 2.18*   GFRESTIMATED 27* 28* 28* 25* 30* 31*   GFRESTBLACK 32* 33* 32* 29* 34* 36*   QAMAR 7.8* 7.5* 7.9* 8.3* 8.4* 8.1*   MAG 1.8 1.6  --   --  1.8 1.9   PROTTOTAL 5.3*  --  6.3*  --  5.3* 5.2*   ALBUMIN  --   --  2.6*  --  2.3* 2.2*   BILITOTAL  --   --  0.3  --  0.2 0.4   ALKPHOS  --   --  292*  --  266* 248*   AST  --   --  Canceled, Test credited  --  26 25   ALT  --   --  55  --  50 50       CBC  Recent Labs   Lab 07/10/21  0624 07/09/21  0632 07/08/21  1746 07/08/21  1533   WBC 8.3 7.1 8.3 Canceled, Test credited   RBC 2.80* 3.29* 3.06* Canceled, Test credited   HGB 8.0* 9.3* 8.9* Canceled, Test credited   HCT 26.0* 30.4* 28.9* Canceled, Test credited   MCV 93 92 94 Canceled, Test credited   MCH 28.6 28.3 29.1 " Canceled, Test credited   MCHC 30.8* 30.6* 30.8* Canceled, Test credited   RDW 15.9* 15.9* 15.9* Canceled, Test credited    292 281 Canceled, Test credited       INR  Recent Labs   Lab 07/10/21  0929   INR 1.06       Time/Communication  I personally spent a total of 35 minutes. Of that >15 minutes was counseling/coordination of patient's care. Plan of care discussed with patient. See my note above for details.    Patient discussed with Dr. Montgomery.

## 2021-07-10 NOTE — CONSULTS
Procedure Note    Attending: Subhash Martins MD  Procedure:Right Thoracentesis  Indication: pleural effusion  Risk Assessment: low  Pre-procedure diagnosis: pleural effusion  Post-procedure diagnosis: pleural effusion    The risks and benefits of the procedure were explained to Jim Willingham who expressed understanding and opted to proceed.  Consent was obtained and placed in the chart and the patient was placed on pulse oximetry.  A time out was performed.    An ultrasound probe was used to identify an area of pleural fluid in the right hemithorax.  This area was prepped and draped in the usual sterile fashion.  8 ml of 1% lidocaine was instilled and fluid located using ultrasound guidance. I initially attempted to advance the 8 fr valved kit catheter. Due to sig resistance within the soft tissue, the plastic catheter became kinked and could not be advanced. I then swapped out for a 5 fr Yueh catheter. On my initial pass with this catheter, I was unable to advance the catheter safety over the superior edge of the rib and so then removed the needed and moved cephalad. I was then able to easily advance into the pleural space there dark, red pleural fluid was encountered. I was able to collect approximately 60 ml of fluid for testing. Interestingly, I was similarly unable to advance the yueh catheter over the needle as it was becoming kinked at the skin. I discusesed the the patient our options at this time, due to his stability on room and air absence of symptoms from the pleural effusion, we elected to defer repeat thoracentesis for therapeutic reasons until results of testing were available. If needed, please re-consult our service and we can perform a therapeutic thoracentesis      The tolerated the procedure well.  Please contact the Consult and Procedure Service if any concerns or complications arise.       Subhash Martins MD    DOS:  7/10/2021

## 2021-07-10 NOTE — PLAN OF CARE
D: Hx:Pt with h/y of  NICM s/p OHT (5/6/21) postop course c/b right pleural air leak with prolonged chest tube, paroxysmal atrial fibrillation, COPD, CKDIII, DMII, and anemia. Admitted on 7/9 due to generalized weakness and fatigue.   I: Monitored vitals and assessed pt status.   PRN: Tylenol,melatonin,tramadol.     A: A0x4. VSS, on RA Generalized weakness with assist by 1 and walker. NSR. Afebrile. Reported pain in his L index (gout vs septic arthritis) ,controlled with Tylenol ,Ultram,hot pack with intermittent effect. No protocol for Mg . On gucci counts. Had salad with 5-6 slices of chicken.  FR 2 l. BM X 1.       Temp:  [97.6  F (36.4  C)-98.7  F (37.1  C)] 97.6  F (36.4  C)  Pulse:  [102-120] 106  Resp:  [16-18] 18  BP: ()/(66-89) 101/73  SpO2:  [96 %-99 %] 98 %      P: Continue to monitor Pt status and report changes to treatment team. On NPO since midnight. Plan for thoracentesis.

## 2021-07-11 ENCOUNTER — APPOINTMENT (OUTPATIENT)
Dept: PHYSICAL THERAPY | Facility: CLINIC | Age: 66
DRG: 186 | End: 2021-07-11
Payer: COMMERCIAL

## 2021-07-11 ENCOUNTER — APPOINTMENT (OUTPATIENT)
Dept: OCCUPATIONAL THERAPY | Facility: CLINIC | Age: 66
DRG: 186 | End: 2021-07-11
Payer: COMMERCIAL

## 2021-07-11 ENCOUNTER — APPOINTMENT (OUTPATIENT)
Dept: INTERVENTIONAL RADIOLOGY/VASCULAR | Facility: CLINIC | Age: 66
DRG: 186 | End: 2021-07-11
Attending: RADIOLOGY
Payer: COMMERCIAL

## 2021-07-11 LAB
ANION GAP SERPL CALCULATED.3IONS-SCNC: 6 MMOL/L (ref 3–14)
ANION GAP SERPL CALCULATED.3IONS-SCNC: 8 MMOL/L (ref 3–14)
BUN SERPL-MCNC: 49 MG/DL (ref 7–30)
BUN SERPL-MCNC: 49 MG/DL (ref 7–30)
CALCIUM SERPL-MCNC: 7.9 MG/DL (ref 8.5–10.1)
CALCIUM SERPL-MCNC: 7.9 MG/DL (ref 8.5–10.1)
CHLORIDE BLD-SCNC: 102 MMOL/L (ref 94–109)
CHLORIDE SERPL-SCNC: 104 MMOL/L (ref 94–109)
CO2 SERPL-SCNC: 21 MMOL/L (ref 20–32)
CO2 SERPL-SCNC: 23 MMOL/L (ref 20–32)
CREAT SERPL-MCNC: 2.7 MG/DL (ref 0.66–1.25)
CREAT SERPL-MCNC: 2.75 MG/DL (ref 0.66–1.25)
ERYTHROCYTE [DISTWIDTH] IN BLOOD BY AUTOMATED COUNT: 15.6 % (ref 10–15)
GFR SERPL CREATININE-BSD FRML MDRD: 23 ML/MIN/{1.73_M2}
GFR SERPL CREATININE-BSD FRML MDRD: 24 ML/MIN/1.73M2
GLUCOSE BLD-MCNC: 184 MG/DL (ref 70–99)
GLUCOSE BLDC GLUCOMTR-MCNC: 284 MG/DL (ref 70–99)
GLUCOSE BLDC GLUCOMTR-MCNC: 359 MG/DL (ref 70–99)
GLUCOSE SERPL-MCNC: 125 MG/DL (ref 70–99)
GRAM STAIN RESULT: NORMAL
GRAM STAIN RESULT: NORMAL
HCT VFR BLD AUTO: 28.4 % (ref 40–53)
HGB BLD-MCNC: 8.7 G/DL (ref 13.3–17.7)
MAGNESIUM SERPL-MCNC: 1.8 MG/DL (ref 1.6–2.3)
MCH RBC QN AUTO: 28.4 PG (ref 26.5–33)
MCHC RBC AUTO-ENTMCNC: 30.6 G/DL (ref 31.5–36.5)
MCV RBC AUTO: 93 FL (ref 78–100)
PLATELET # BLD AUTO: 358 10E9/L (ref 150–450)
POTASSIUM BLD-SCNC: 5.3 MMOL/L (ref 3.4–5.3)
POTASSIUM SERPL-SCNC: 5.1 MMOL/L (ref 3.4–5.3)
RBC # BLD AUTO: 3.06 10E12/L (ref 4.4–5.9)
SODIUM SERPL-SCNC: 131 MMOL/L (ref 133–144)
SODIUM SERPL-SCNC: 132 MMOL/L (ref 133–144)
TACROLIMUS BLD-MCNC: 11.4 UG/L (ref 5–15)
TME LAST DOSE: NORMAL H
TRIGL FLD-MCNC: 28 MG/DL
WBC # BLD AUTO: 7.8 10E9/L (ref 4–11)

## 2021-07-11 PROCEDURE — 89051 BODY FLUID CELL COUNT: CPT | Performed by: PHYSICIAN ASSISTANT

## 2021-07-11 PROCEDURE — 214N000001 HC R&B CCU UMMC

## 2021-07-11 PROCEDURE — 258N000001 HC RX 258: Performed by: STUDENT IN AN ORGANIZED HEALTH CARE EDUCATION/TRAINING PROGRAM

## 2021-07-11 PROCEDURE — 32557 INSERT CATH PLEURA W/ IMAGE: CPT

## 2021-07-11 PROCEDURE — 85027 COMPLETE CBC AUTOMATED: CPT

## 2021-07-11 PROCEDURE — 97110 THERAPEUTIC EXERCISES: CPT | Mod: GO

## 2021-07-11 PROCEDURE — 84478 ASSAY OF TRIGLYCERIDES: CPT | Performed by: PHYSICIAN ASSISTANT

## 2021-07-11 PROCEDURE — 32557 INSERT CATH PLEURA W/ IMAGE: CPT | Mod: RT | Performed by: RADIOLOGY

## 2021-07-11 PROCEDURE — 250N000009 HC RX 250: Performed by: INTERNAL MEDICINE

## 2021-07-11 PROCEDURE — 87556 M.TUBERCULO DNA AMP PROBE: CPT | Performed by: PHYSICIAN ASSISTANT

## 2021-07-11 PROCEDURE — 36415 COLL VENOUS BLD VENIPUNCTURE: CPT | Performed by: PHYSICIAN ASSISTANT

## 2021-07-11 PROCEDURE — 250N000013 HC RX MED GY IP 250 OP 250 PS 637: Performed by: PHYSICIAN ASSISTANT

## 2021-07-11 PROCEDURE — 0W9930Z DRAINAGE OF RIGHT PLEURAL CAVITY WITH DRAINAGE DEVICE, PERCUTANEOUS APPROACH: ICD-10-PCS | Performed by: RADIOLOGY

## 2021-07-11 PROCEDURE — 80197 ASSAY OF TACROLIMUS: CPT | Performed by: PHYSICIAN ASSISTANT

## 2021-07-11 PROCEDURE — 97116 GAIT TRAINING THERAPY: CPT | Mod: GP

## 2021-07-11 PROCEDURE — 97530 THERAPEUTIC ACTIVITIES: CPT | Mod: GP

## 2021-07-11 PROCEDURE — 82962 GLUCOSE BLOOD TEST: CPT | Performed by: INTERNAL MEDICINE

## 2021-07-11 PROCEDURE — 87075 CULTR BACTERIA EXCEPT BLOOD: CPT | Performed by: PHYSICIAN ASSISTANT

## 2021-07-11 PROCEDURE — 250N000009 HC RX 250: Performed by: STUDENT IN AN ORGANIZED HEALTH CARE EDUCATION/TRAINING PROGRAM

## 2021-07-11 PROCEDURE — 88305 TISSUE EXAM BY PATHOLOGIST: CPT | Mod: TC | Performed by: PHYSICIAN ASSISTANT

## 2021-07-11 PROCEDURE — 272N000196 HC ACCESSORY CR5

## 2021-07-11 PROCEDURE — 999N001017 HC STATISTIC GLUCOSE BY METER IP

## 2021-07-11 PROCEDURE — 87206 SMEAR FLUORESCENT/ACID STAI: CPT | Performed by: PHYSICIAN ASSISTANT

## 2021-07-11 PROCEDURE — 36415 COLL VENOUS BLD VENIPUNCTURE: CPT

## 2021-07-11 PROCEDURE — 87116 MYCOBACTERIA CULTURE: CPT | Performed by: PHYSICIAN ASSISTANT

## 2021-07-11 PROCEDURE — 99152 MOD SED SAME PHYS/QHP 5/>YRS: CPT | Mod: GC | Performed by: RADIOLOGY

## 2021-07-11 PROCEDURE — C1769 GUIDE WIRE: HCPCS

## 2021-07-11 PROCEDURE — 97535 SELF CARE MNGMENT TRAINING: CPT | Mod: GO

## 2021-07-11 PROCEDURE — 80048 BASIC METABOLIC PNL TOTAL CA: CPT | Performed by: PHYSICIAN ASSISTANT

## 2021-07-11 PROCEDURE — 99232 SBSQ HOSP IP/OBS MODERATE 35: CPT | Mod: 24 | Performed by: PHYSICIAN ASSISTANT

## 2021-07-11 PROCEDURE — 250N000012 HC RX MED GY IP 250 OP 636 PS 637: Performed by: PHYSICIAN ASSISTANT

## 2021-07-11 PROCEDURE — 250N000013 HC RX MED GY IP 250 OP 250 PS 637: Performed by: STUDENT IN AN ORGANIZED HEALTH CARE EDUCATION/TRAINING PROGRAM

## 2021-07-11 PROCEDURE — 83735 ASSAY OF MAGNESIUM: CPT

## 2021-07-11 PROCEDURE — 97110 THERAPEUTIC EXERCISES: CPT | Mod: GP

## 2021-07-11 PROCEDURE — 250N000013 HC RX MED GY IP 250 OP 250 PS 637

## 2021-07-11 PROCEDURE — 250N000012 HC RX MED GY IP 250 OP 636 PS 637

## 2021-07-11 PROCEDURE — 99223 1ST HOSP IP/OBS HIGH 75: CPT | Mod: 24 | Performed by: INTERNAL MEDICINE

## 2021-07-11 PROCEDURE — 87070 CULTURE OTHR SPECIMN AEROBIC: CPT | Performed by: INTERNAL MEDICINE

## 2021-07-11 PROCEDURE — C1729 CATH, DRAINAGE: HCPCS

## 2021-07-11 PROCEDURE — 87205 SMEAR GRAM STAIN: CPT | Performed by: PHYSICIAN ASSISTANT

## 2021-07-11 PROCEDURE — 250N000011 HC RX IP 250 OP 636: Performed by: STUDENT IN AN ORGANIZED HEALTH CARE EDUCATION/TRAINING PROGRAM

## 2021-07-11 PROCEDURE — 80048 BASIC METABOLIC PNL TOTAL CA: CPT

## 2021-07-11 RX ORDER — PENTAMIDINE ISETHIONATE 300 MG/300MG
300 INHALANT RESPIRATORY (INHALATION)
Status: DISCONTINUED | OUTPATIENT
Start: 2021-07-11 | End: 2021-07-14

## 2021-07-11 RX ORDER — LORAZEPAM 1 MG/1
1 TABLET ORAL
Status: COMPLETED | OUTPATIENT
Start: 2021-07-11 | End: 2021-07-11

## 2021-07-11 RX ORDER — NALOXONE HYDROCHLORIDE 0.4 MG/ML
0.2 INJECTION, SOLUTION INTRAMUSCULAR; INTRAVENOUS; SUBCUTANEOUS
Status: DISCONTINUED | OUTPATIENT
Start: 2021-07-11 | End: 2021-07-11 | Stop reason: HOSPADM

## 2021-07-11 RX ORDER — DEXTROSE MONOHYDRATE 25 G/50ML
50 INJECTION, SOLUTION INTRAVENOUS ONCE
Status: COMPLETED | OUTPATIENT
Start: 2021-07-11 | End: 2021-07-11

## 2021-07-11 RX ORDER — NALOXONE HYDROCHLORIDE 0.4 MG/ML
0.4 INJECTION, SOLUTION INTRAMUSCULAR; INTRAVENOUS; SUBCUTANEOUS
Status: DISCONTINUED | OUTPATIENT
Start: 2021-07-11 | End: 2021-07-11 | Stop reason: HOSPADM

## 2021-07-11 RX ORDER — ALBUTEROL SULFATE 0.83 MG/ML
2.5 SOLUTION RESPIRATORY (INHALATION)
Status: DISCONTINUED | OUTPATIENT
Start: 2021-07-11 | End: 2021-07-14

## 2021-07-11 RX ORDER — FENTANYL CITRATE 50 UG/ML
25-50 INJECTION, SOLUTION INTRAMUSCULAR; INTRAVENOUS EVERY 5 MIN PRN
Status: DISCONTINUED | OUTPATIENT
Start: 2021-07-11 | End: 2021-07-11 | Stop reason: HOSPADM

## 2021-07-11 RX ADMIN — UMECLIDINIUM 1 PUFF: 62.5 AEROSOL, POWDER ORAL at 09:06

## 2021-07-11 RX ADMIN — TACROLIMUS 4.5 MG: 1 CAPSULE ORAL at 09:03

## 2021-07-11 RX ADMIN — AMITRIPTYLINE HYDROCHLORIDE 25 MG: 25 TABLET, FILM COATED ORAL at 22:27

## 2021-07-11 RX ADMIN — GABAPENTIN 300 MG: 300 CAPSULE ORAL at 20:12

## 2021-07-11 RX ADMIN — TACROLIMUS 4 MG: 1 CAPSULE ORAL at 18:08

## 2021-07-11 RX ADMIN — GABAPENTIN 300 MG: 300 CAPSULE ORAL at 09:04

## 2021-07-11 RX ADMIN — INSULIN GLARGINE 24 UNITS: 100 INJECTION, SOLUTION SUBCUTANEOUS at 22:28

## 2021-07-11 RX ADMIN — NYSTATIN 1000000 UNITS: 500000 SUSPENSION ORAL at 22:27

## 2021-07-11 RX ADMIN — PANTOPRAZOLE SODIUM 40 MG: 40 TABLET, DELAYED RELEASE ORAL at 20:12

## 2021-07-11 RX ADMIN — MYCOPHENOLATE MOFETIL 1000 MG: 250 CAPSULE ORAL at 09:04

## 2021-07-11 RX ADMIN — Medication 50 MG: at 22:28

## 2021-07-11 RX ADMIN — NYSTATIN 1000000 UNITS: 500000 SUSPENSION ORAL at 16:30

## 2021-07-11 RX ADMIN — ACETAMINOPHEN 650 MG: 325 TABLET, FILM COATED ORAL at 22:28

## 2021-07-11 RX ADMIN — ALLOPURINOL 200 MG: 100 TABLET ORAL at 09:05

## 2021-07-11 RX ADMIN — ACETAMINOPHEN 650 MG: 325 TABLET, FILM COATED ORAL at 08:59

## 2021-07-11 RX ADMIN — HUMAN INSULIN 10 UNITS: 100 INJECTION, SOLUTION SUBCUTANEOUS at 04:35

## 2021-07-11 RX ADMIN — NYSTATIN 1000000 UNITS: 500000 SUSPENSION ORAL at 09:06

## 2021-07-11 RX ADMIN — ASPIRIN 81 MG CHEWABLE TABLET 81 MG: 81 TABLET CHEWABLE at 09:05

## 2021-07-11 RX ADMIN — ANAKINRA 100 MG: 100 INJECTION, SOLUTION SUBCUTANEOUS at 09:13

## 2021-07-11 RX ADMIN — PREDNISONE 15 MG: 5 TABLET ORAL at 09:04

## 2021-07-11 RX ADMIN — LIDOCAINE HYDROCHLORIDE 5 ML: 10 INJECTION, SOLUTION EPIDURAL; INFILTRATION; INTRACAUDAL; PERINEURAL at 15:45

## 2021-07-11 RX ADMIN — DEXTROSE 50 % IN WATER (D50W) INTRAVENOUS SYRINGE 50 ML: at 04:40

## 2021-07-11 RX ADMIN — FLUTICASONE FUROATE AND VILANTEROL TRIFENATATE 1 PUFF: 100; 25 POWDER RESPIRATORY (INHALATION) at 09:06

## 2021-07-11 RX ADMIN — FENTANYL CITRATE 25 MCG: 50 INJECTION INTRAMUSCULAR; INTRAVENOUS at 15:10

## 2021-07-11 RX ADMIN — Medication 50 MG: at 08:59

## 2021-07-11 RX ADMIN — MINERAL OIL AND PETROLATUM 1 G: 150; 830 OINTMENT OPHTHALMIC at 22:28

## 2021-07-11 RX ADMIN — ROSUVASTATIN CALCIUM 10 MG: 10 TABLET, FILM COATED ORAL at 09:04

## 2021-07-11 RX ADMIN — MONTELUKAST 10 MG: 10 TABLET, FILM COATED ORAL at 22:28

## 2021-07-11 RX ADMIN — NYSTATIN 1000000 UNITS: 500000 SUSPENSION ORAL at 12:38

## 2021-07-11 RX ADMIN — GABAPENTIN 300 MG: 300 CAPSULE ORAL at 12:38

## 2021-07-11 RX ADMIN — BUPROPION HYDROCHLORIDE 75 MG: 75 TABLET, FILM COATED ORAL at 20:11

## 2021-07-11 RX ADMIN — LORAZEPAM 1 MG: 1 TABLET ORAL at 12:38

## 2021-07-11 RX ADMIN — PANTOPRAZOLE SODIUM 40 MG: 40 TABLET, DELAYED RELEASE ORAL at 09:05

## 2021-07-11 RX ADMIN — MYCOPHENOLATE MOFETIL 1000 MG: 250 CAPSULE ORAL at 20:11

## 2021-07-11 RX ADMIN — FENTANYL CITRATE 50 MCG: 50 INJECTION INTRAMUSCULAR; INTRAVENOUS at 15:17

## 2021-07-11 RX ADMIN — BUPROPION HYDROCHLORIDE 75 MG: 75 TABLET, FILM COATED ORAL at 09:05

## 2021-07-11 ASSESSMENT — ACTIVITIES OF DAILY LIVING (ADL)
ADLS_ACUITY_SCORE: 15

## 2021-07-11 NOTE — PROGRESS NOTES
Interventional Radiology Intra-procedural Nursing Note    Patient Name: Jim Willingham  Medical Record Number: [id number]  Today's Date: July 11, 2021    Start Time: 1520  Puncture time: 1521  End of procedure time: 1540  Procedure: Image guided right chest tube placement  Fire Safety Score: 1    Consent Review/Timeout Performed by: Dr. Dayanna Johns  Procedure Performed By: Dr. Lyndon Maynard    Report given to: Helene MONTELONGO  Time pt departs:  1545  : NA    Other Notes:  Alert male transported via cart from inpatient room to IR Procedure Room 7 for planned intervention.  ID band confirmed and patient acknowledges understanding of planned procedure. Patient repositioned to procedure table via hover-mat and positioned sitting upright.  Patient prepped and draped per policy see VS flowsheet, MAR for further information.       Right posterior thoracic site identified using image guidance.  A total of 120 cc of serosanguinous colored fluid obtained via aspiration.  Chest tube placed, site cleansed and dressed per protocol.  Specimen sent for labs ordered.    Patient returned to inpatient room for post-procedure monitoring.    Nuris Crowe RN

## 2021-07-11 NOTE — CONSULTS
[facility]  Transplant Infectious Disease Consult Note - New Patient     Patient:  Jim Willingham, Date of birth 1955, Medical record number [id number]  Date of Visit:  07/11/2021  Consult requested by Dr. Nicola Seth for evaluation of Staph isolated in a synovial fluid culture s/p heart transplant.         Assessment and Recommendations:   Recommendations:  - Would not treat the 7/8/21 aerobic synovial fluid culture Staphylococcus epidermidis isolate at this time.  It is highly likely a culture contaminant.  - Send aerobic, anaerobic, fungal, and AFB pleural fluid stains / cultures from the upcoming therapeutic thoracentesis.  - Would also not re-treat the right exudative pleural effusion yet again, unless the the 7/10/21 or 7/11/21 pleural fluid cultures unexpectedly grow a potential pathogen.  (Please page me back if cultures turn positive.)  - Continue TMP-SMX and Valcyte prophylaxis as per transplant protocol.    Thanks for the consult on this complex patient. The case was discussed with the Cardiology 2 service.  Transplant ID will not follow with you, but please page me if additional ID questions or concerns arise.    Leonidas Prado MD  Pager [phone]    Assessment:  A 66 year old gentleman immunosuppressed (tacrolimus, mycophenolate, prednisone, now also anakinra) s/p a 5/6/21 heart transplant for nonischemic cardiomyopathy with a post-transplant course complicated by a right pleural air leak requiring a prolonged chest tube and paroxysmal atrial fibrillation.  He also has a history of COPD, type 2 diabetes mellitus, stage three chronic kidney disease, chronic anemia, and gout.  He was recently previously hospitalized at [facility] from  6/28 - 7/5/21 with a febrile presumed right lower lobe pneumonia and right pleural exudative effusion which were treated with empiric broad-spectrum antibiotics and right thoracentesis.  He is now readmitted to the [facility] Cardiology 2 service on  7/8/21 PM with persistent fatigue, generalized weakness, anorexia and malnutrition, a left second finger MCP acute arthritis, and a re-accumulated right pleural exudative effusion.  He has been consistently afebrile without a peripheral leukocytosis since 6/30/21.  He is otherwise stable.  A 7/8/21 left second MCP synovial fluid analysis was consistent with pseudogout, but the culture belatedly has grown Staph epidermidis.    ID issues:    - Left second finger MCP joint pseudogout with belated growth of Staph epidermidis on synovial fluid culture -- a presumptive culture contaminant:  The culture grew S epidermidis -- a frequently contaminating bacterium -- late at about forty hours of incubation.  The synovial fluid analysis clearly showed CPPD crystals.  This combination is highly suspicious for culture contamination.  In addition, without any antibiotics over the past week except for TMP-SMX prophylaxis, he has lacked fever or leukocytosis and an admission procalcitonin was negative.  At this point, the diagnosis of pseudogout seems very strong, so antibiotic therapy is not indicated.    - Re-accumulated right pleural exudative effusion:  The prior 6/29/21 pleural fluid cultures and the more recent 7/10/21 pleural fluid cultures are without growth to date, with both Gram stains showing no organisms (despite the presence of WBCs).  The broad spectrum antibiotic therapy during the prior  6/28 - 7/5/21 hospitalization does not seem to have prevented the re-accumulation of the right pleural effusion.  He lacks fever, leukocytosis, an elevated procalcitonin, or symptoms (this admission) of pneumonia.  At this point, despite the clear exudative quality by Light['s criteria, the re-accumulated right pleural effusion appears likely to be due to some non-infectious (or at least, non-bacterial) process, instead of a persistent bacterial empyema.  So, would not give another course of antibiotic therapy, unless a pleural  culture subsequently grows a plausible pathogen.  With the repeat therapeutic thoracentesis, sending repeat aerobic as well as anaerobic, fungal, and AFB cultures would be prudent, to be more certain.    Other ID issues:  - QTc interval:  455 msec on 7/8/21 EKG.  - Bacterial prophylaxis:  None indicated.  - Pneumocystis prophylaxis:  TMP-SMX.  - Viral serostatus & prophylaxis:  CMV / EBV / HSV-1 / VZV recipient seropositive.  On Valcyte universal prophylaxis.  - Fungal prophylaxis:  None indicated.  - Immunization status:  To sson post-transplant, but underimmunized.  Starting at three months post-transplant (early 8/21), should begin to receive pneumococcal, Shingrix, HBV, and Covid-19 vaccine series.  - Gamma globulin status:  Serum IgG was 838 pre-transplant on 12/21/20.  Not presently relevant.  - Isolation status: Routine.         History of Infectious Disease Illness:   Mr. Willingham is a 66 year old gentleman immunosuppressed (tacrolimus, mycophenolate, prednisone, now also anakinra) s/p a 5/6/21 heart transplant for nonischemic cardiomyopathy with a post-transplant course complicated by a right pleural air leak requiring a prolonged chest tube and paroxysmal atrial fibrillation.  He also has a history of COPD, type 2 diabetes mellitus, stage three chronic kidney disease, chronic anemia, and gout.  He was most recently previously hospitalized at [facility] from 6/28 - 7/5/21 with febrile sepsis due to right lower lobe healthcare-associated pneumonia, although no positive respiratory microbiology was apparently obtained (results are difficult to be certain about today after the [facility] laboratory system change last night).  One out of two blood cultures grew a presumably-contaminant Staph epidermidis.  In addition, he had an exudative right pleural effusion which was drained of 270 ml by thoracentesis on 6/29/21 with cultures negative.  The pneumonia and suspected empyema was treated empirically with cefepime (6/28 -  7/3/21), levofloxacin (7/3 - 7/4/21 to complete a five day course), azithromycin (6/29 - 7/1/21), and vancomycin (6/29 - 7/1/21, for the Staph epidermidis blood culture).  He defervesced quickly and has been afebrile since 6/30/21.    He was now re-admitted three days later to the [facility] Cardiology 2 service on 7/8/21 PM with persistent ambulatory generalized weakness and fatigue that had not improved since his 7/5/21 discharge.  He fell about three times during those three days and complained of intermittent orthostatic dizziness as well as a weak appetite and inanition.  In addition, two days prior to admission, he began to develop left second finger MCP swelling that worsened on the day of re-admission with erythema and marked pain to movement and palpation, similar to his past gout flares in that joint.  He lacks other arthralgias or swelling of other joints.  Upon admission, he lacked fever, chills, EENT symptoms, resting dyspnea, cough, chest pain, nausea / emesis, abdominal pain, diarrhea, or additional complaints upon admission.  Since then, he has remained afebrile (T max 98.7 degrees F) and has had a steadily normal peripheral WBC in the 5.6 - 8.3 range, with ahigh CRP (87 - 98) and ESR (72 on 7/8/21) but a negative admission procalcitonin (0.18 on 7/8/21).  So far, the only antimicrobial he has received during this hospitalization is prophylactic TMP-SMX.  A left hand x-ray on 7/8/21 showed chondrocalcinosis and capsular calcification of two left (second and third) MCP joints.  Arthrocentesis was performed on the left second MCP joint by [facility] on 7/8/21 with synovial fluid yielding CPPD crystals and a negative Gram stain (for organisms) with many PMNs, consistent with pseudogout. Ankirna was started on 7/9/21 and the joint is somewhat improved but still now swollen and painful.  Yesterday, 7/10/21, the 7/8/21 left second MCP synovial aspirated fluid culture belatedly isolated a  Staphylococcus epidermidis (susceptibilities pending) at about 42 hours of incubation.  Transplant ID was consulted regarding whether this needs to be treated.    In addition, an admission chest x-ray on 7/8/21 and a repeat chest x-ray on 7/10/21 showed re-accumulated right pleural effusion (after it had previously been drained on 6/29/21.  A diagnostic thoracentesis performed on 7/10/21 once again yielded an exudate (, pleural total protein 2.7, glucose 102) with 384 WBC (52% neutrophils, 28% L, 20% M).  The pleural Gram stain showed no organisms.  Cultures are reportedly no growth to date.  A repeat therapeutic right thoracentesis with placement of a drainage tube is planned for today and additional cultures will be sent.  Beyond these two issues, Mr. Willingham is overall slowly improving and says his strength and energy are slightly better than upon admission.  He is eating partial meals.  His pain control is adequate. He has received diuresis and his breathing is stable on room air.  He lacks additional new complaints over the past couple of days.       Transplants:  5/6/2021 (Heart), Postoperative day:  66.  Coordinator Yolette Rueda    Review of Systems:  CONSTITUTIONAL:  No fever, chills, or sweats since 6/30/21.  Increased fatigue, generalized weakness with ambulation, and marked anorexia / inanition prior to admission -- now slightly improved.  EYES: No eye pain, visual changes, or scleral icterus.  ENT:  No rhinorrhea, sinus pain, otalgia, hearing loss, tinnitus, sore throat, or oral pain.  LYMPH:  No edema.  RESPIRATORY:  Right pleural empyema of uncertain etiology.  No cough, increased sputum, or dyspnea.  CARDIOVASCULAR:  S/p heart transplant.  No chest pain, palpitations.  GASTROINTESTINAL:  No abdominal pain, nausea, vomiting, diarrhea or constipation.  GENITOURINARY:  Stage 3 chronic kidney disease.  No dysuria.  HEME:  Chronic anemia.  No easy bruising.  ENDOCRINE:  Controlled type 2 diabetes  mellitus.  MUSCULOSKELETAL:  Left second finger MCP joint edema / pain of pseudogout.  No other focal myalgias / arthralgias.  SKIN:  No rash or pruritus.  NEURO:  No headache.  PSYCH:  Negative.    Past Medical History:   Diagnosis Date     Bariatric surgery status 2003     Benign essential hypertension 05/11/2017     Bilateral carpal tunnel syndrome 11/02/2020     Cardiomyopathy, unspecified (H) 05/08/2017     CKD (chronic kidney disease) stage 3, GFR 30-59 ml/min 05/11/2017     COPD (chronic obstructive pulmonary disease) (H) 11/02/2020     Depression 05/11/2017     Diabetes mellitus (H) 1995     Gouty arthropathy, chronic, without tophi 11/02/2020     H/O gastric bypass 05/11/2017     ICD (implantable cardioverter-defibrillator), biventricular, in situ 05/11/2017     LVAD (left ventricular assist device) present (H)      Major depression, recurrent, chronic (H) 11/02/2020     NICM (nonischemic cardiomyopathy) (H)/ EF 20% 05/11/2017    ECHO: LVEDd. 7.66 cm, Restrictive pattern , Severe mitral valve regurgitation     CECILIA (obstructive sleep apnea) 05/11/2017     Paroxysmal atrial fibrillation (H) 05/11/2017     Paroxysmal VT (H) 05/11/2017     Rotator cuff tear arthropathy of both shoulders 11/02/2020     Uncomplicated asthma      Vitamin B12 deficiency (non anemic) 05/11/2017     Past Surgical History:   Procedure Laterality Date     ANESTHESIA CARDIOVERSION N/A 05/11/2020    Procedure: ANESTHESIA, FOR CARDIOVERSION @1100;  Surgeon: GENERIC ANESTHESIA PROVIDER;  Location: [facility] OR     CV HEART BIOPSY N/A 5/13/2021    Procedure: Heart Biopsy;  Surgeon: Scout Robins MD;  Location: [facility] HEART CARDIAC CATH LAB     CV HEART BIOPSY N/A 5/20/2021    Procedure: Heart Biopsy;  Surgeon: Jeffrey Gibson MD;  Location: [facility] HEART CARDIAC CATH LAB     CV HEART BIOPSY N/A 5/27/2021    Procedure: Heart Biopsy;  Surgeon: Jac Dover MD;  Location:  HEART CARDIAC CATH LAB     CV HEART BIOPSY N/A  6/7/2021    Procedure: CV HEART BIOPSY;  Surgeon: Jeffrey Gibson MD;  [facility]     CV HEART BIOPSY N/A 6/21/2021    Procedure: CV HEART BIOPSY;  Surgeon: Scout Robins MD;  [facility]ARD[facility]     CV HEART BIOPSY N/A 7/5/2021    Procedure: CV HEART BIOPSY;  Surgeon: Jeffrey Gibson MD;  Location:  HEART CARDIAC CATH LAB     CV RIGHT HEART CATH MEASUREMENTS RECORDED N/A 07/24/2019    Procedure: CV RIGHT HEART CATH;  Surgeon: Renu Sears MD;  Location:  HEART CARDIAC CATH LAB     CV RIGHT HEART CATH MEASUREMENTS RECORDED N/A 08/05/2020    Procedure: CV RIGHT HEART CATH;  Surgeon: Nicola Seth MD;  Location:  HEART CARDIAC CATH LAB     CV RIGHT HEART CATH MEASUREMENTS RECORDED N/A 01/07/2021    Procedure: CV RIGHT HEART CATH;  Surgeon: Jac Dover MD;  Location:  HEART CARDIAC CATH LAB     CV RIGHT HEART CATH MEASUREMENTS RECORDED N/A 02/23/2021    Procedure: Heart Cath Right Heart Cath;  Surgeon: Jeffrey Gibson MD;  Location:  HEART CARDIAC CATH LAB     CV RIGHT HEART CATH MEASUREMENTS RECORDED N/A 03/23/2021    Procedure: Heart Cath Right Heart Cath. request for 3/23;  Surgeon: Jeffrey Gibson MD;  Location:  HEART CARDIAC CATH LAB     CV RIGHT HEART CATH MEASUREMENTS RECORDED N/A 5/13/2021    Procedure: Right Heart Cath;  Surgeon: Scout Robins MD;  Location:  HEART CARDIAC CATH LAB     CV RIGHT HEART CATH MEASUREMENTS RECORDED N/A 5/20/2021    Procedure: Right Heart Cath;  Surgeon: Jeffrey Gibson MD;  Location:  HEART CARDIAC CATH LAB     CV RIGHT HEART CATH MEASUREMENTS RECORDED N/A 5/27/2021    Procedure: Right Heart Cath;  Surgeon: Jac Dover MD;  Location:  HEART CARDIAC CATH LAB     CV RIGHT HEART CATH MEASUREMENTS RECORDED N/A 6/7/2021    Procedure: CV RIGHT HEART CATH;  Surgeon: Jeffrey Gibson MD;  Location:   HEART CARDIAC CATH LAB     CV RIGHT HEART CATH MEASUREMENTS RECORDED N/A 2021    Procedure: CV RIGHT HEART CATH;  Surgeon: Scout Robins MD;  Location:  HEART CARDIAC CATH LAB     CV RIGHT HEART CATH MEASUREMENTS RECORDED N/A 2021    Procedure: CV RIGHT HEART CATH;  Surgeon: Jeffrey Gibson MD;  Location:  HEART CARDIAC CATH LAB     GI SURGERY      Sylvester en Y     INSERT VENTRICULAR ASSIST DEVICE LEFT (HEARTMATE II) N/A 2017    Procedure: INSERT VENTRICULAR ASSIST DEVICE LEFT (HEARTMATE II);  Median Sternotomy Heartmate II Left Ventricular Assist Device Insertion on Pump Oxygenator;  Surgeon: Ronnie Quigley MD;  Location:  OR     ORTHOPEDIC SURGERY      right knee wired     PICC DOUBLE LUMEN PLACEMENT Right 2020    5FR PICC DL. Length-43cm (1cm out).     PICC INSERTION - DOUBLE LUMEN Right 2021    IK/BRACH     RELEASE CARPAL TUNNEL BILATERAL Bilateral 2021    Procedure: Bilateral carpal tunnel release;  Surgeon: Jermaine Brand MD;  Location:  OR     TRANSPLANT HEART RECIPIENT N/A 2021    Procedure: Redo median sternotomy, lysis of adhesions, heart transplant recipient, on cardiopulmonary bypass, intraoperative transesophageal echocardiogram per anesthesia, Implantable Cardioverter Defibrillator (ICD) removal;  Surgeon: Ronnie Quigley MD;  Location:  OR     Family History   Problem Relation Age of Onset     Cerebrovascular Disease Mother 64     Diabetes Mother      Hypertension Mother      Coronary Artery Disease Father      Diabetes Type 2  Father      Obesity Brother      Obesity Brother      Cerebrovascular Disease Daughter 40     Social History     Tobacco Use     Smoking status: Former Smoker     Quit date:      Years since quittin.5     Smokeless tobacco: Never Used   Substance Use Topics     Alcohol use: No     Drug use: No   Formerly employed as a respiratory therapist.    Immunization History   Administered Date(s)  Administered     Influenza Quad, Recombinant, p-free (RIV4) 01/24/2020     Influenza, Quad, High Dose, Pf, 65yr + 09/26/2020     Pneumo Conj 13-V (2010&after) 02/23/2015     Tdap (Adacel,Boostrix) 11/02/2020     Patient Active Problem List   Diagnosis     Paroxysmal atrial fibrillation (H)     Type 2 diabetes mellitus with complication, with long-term current use of insulin (H)     Stage 3b chronic kidney disease     H/O gastric bypass     CECILIA (obstructive sleep apnea)     Vitamin B12 deficiency (non anemic)     Paroxysmal VT (H)     ICD (implantable cardioverter-defibrillator), [employer] Biventricular ICD - Not Dependent     NICM (nonischemic cardiomyopathy) (H)/ EF 20%     Heart failure (H)     LVAD (left ventricular assist device) present (H)     Long-term (current) use of anticoagulants [Z79.01]     Physical deconditioning     Chronic systolic congestive heart failure (H)     Iron deficiency anemia     Influenza     Dyspnea     Acute-on-chronic kidney injury (H)     Shortness of breath     WALTER (acute kidney injury) (H)     Bacteremia     Class 2 severe obesity due to excess calories with serious comorbidity and body mass index (BMI) of 35.0 to 35.9 in adult (H)     Bilateral carpal tunnel syndrome     Rotator cuff tear arthropathy of both shoulders     COPD (chronic obstructive pulmonary disease) (H)     Major depression, recurrent, chronic (H)     Gouty arthropathy, chronic, without tophi     Right carpal tunnel syndrome     Decompensated heart failure (H)     Need for prophylactic antibiotic     S/P orthotopic heart transplant (H)     Lung field abnormal     Transplanted heart (H)     Fever     Heart replaced by transplant (H)     Generalized muscle weakness     Fall, initial encounter          Current Medications & Allergies:       allopurinol  200 mg Oral Daily     amitriptyline  25 mg Oral At Bedtime     anakinra  100 mg Subcutaneous Every Other Day     artificial tears  1 inch Both Eyes At Bedtime      aspirin  81 mg Oral Daily     buPROPion  75 mg Oral BID     [Held by provider] enoxaparin ANTICOAGULANT  40 mg Subcutaneous Q24H     fluticasone-vilanterol  1 puff Inhalation Daily     gabapentin  300 mg Oral QAM     gabapentin  300 mg Oral BID     insulin glargine  24 Units Subcutaneous At Bedtime     montelukast  10 mg Oral At Bedtime     mycophenolate  1,000 mg Oral BID     nystatin  1,000,000 Units Swish & Swallow 4x Daily     pantoprazole  40 mg Oral BID     predniSONE  15 mg Oral QAM     rosuvastatin  10 mg Oral Daily     sodium chloride (PF)  3 mL Intracatheter Q8H     tacrolimus  4 mg Oral QPM     tacrolimus  4.5 mg Oral QAM     umeclidinium  1 puff Inhalation Daily     valGANciclovir  450 mg Oral Every Other Day     Infusions/Drips:      - MEDICATION INSTRUCTIONS -       Allergies   Allergen Reactions     Grass Shortness Of Breath     Ace Inhibitors Cough     Dust Mites Other (See Comments)     Asthma     Mold Other (See Comments)     Asthma     Penicillins Other (See Comments)     Unknown - childhood exposure    Tolerated Zosyn 9/18-9/20/2020     Sulfa Drugs Other (See Comments) and Unknown     Unknown childhood reaction          Physical Exam:     Patient Vitals for the past 24 hrs:   BP Temp Temp src Pulse Resp SpO2   07/11/21 0430 99/72 -- -- 104 18 97 %   07/10/21 2240 107/81 96.7  F (35.9  C) Oral 107 18 97 %   07/10/21 2018 115/76 97.7  F (36.5  C) Oral 108 18 98 %   07/10/21 1551 101/70 97.9  F (36.6  C) Oral 109 18 99 %     Ranges for vital signs over the past 24 hours:   Temp:  [96.7  F (35.9  C)-97.9  F (36.6  C)] 96.7  F (35.9  C)  Pulse:  [104-109] 104  Resp:  [18] 18  BP: ()/(70-81) 99/72  SpO2:  [97 %-99 %] 97 %  Vitals:    07/08/21 1420 07/09/21 0638 07/10/21 0700   Weight: 88.7 kg (195 lb 9.6 oz) 86 kg (189 lb 8 oz) 87.5 kg (192 lb 14.4 oz)     Intake/Output Summary (Last 24 hours) at 7/11/2021 1246  Last data filed at 7/10/2021 2233  Gross per 24 hour   Intake 280 ml   Output 400 ml    Net -120 ml     Physical Examination:  GENERAL:  Pleasant, conversant, [id number] and fatigued-appearing, 66 year old man in bed in no acute distress.  HEAD:  Normocephalic, atraumatic.  EYES:  EOMI, PERRL, anicteric sclerae without conjunctival injection.  ENT:  External auditory canals patent without discharge.  No supplemental oxygen.  Oropharynx is moist without exudates or ulcers.  NECK:  Supple.  LYMPH:  No cervical or supraclavicular lymphadenopathy  LUNGS:  Clear to auscultation bilaterally.  CARDIOVASCULAR:  Regular rate and rhythm, normal S1, S2, without murmur, gallop, or rub.  ABDOMEN:  Normal bowel sounds, soft, nontender, no hepatosplenomegaly.  BACK:  No CVA tenderness.  :  No Russell.  EXTREMITIES:  Left second finger MCP joint edema / tenderness / mild increased warmth and erythema (reportedly somewhat improved).  Distally warm, trace bipedal edema.  SKIN:  No acute rash or lesion.  Peripheral IV line lacks inflammation.  NEUROLOGIC:  Alert, oriented, moves extremities x 4.         Laboratory Data:     No results found for: ACD4    Inflammatory Markers    Recent Labs   Lab Test 07/09/21  0632 07/08/21  1746 07/08/21  1533 03/07/21  0720 03/06/21  1032 10/27/20  1250 10/20/20  1305 10/20/20  1305 09/10/20  0830   SED  --  72*  --   --   --  10  --  8  --    CRP 98.0*  --  87.0*  --    < > 3.5  --  11.0*  --    RAFFY  --   --   --  7.3  --   --    < >  --   --    PSA  --   --   --   --   --   --   --   --  0.35    < > = values in this interval not displayed.     Immune Globulin Studies     Recent Labs   Lab Test 12/21/20  1133      IGM 55        Metabolic Studies       Recent Labs   Lab Test 07/11/21  0710 07/10/21  2031 07/10/21  1741 07/08/21  1533 07/01/21  0644 06/30/21  0339 06/28/21  2208 06/21/21  0933 06/07/21  0807 05/05/21  0628 05/04/21  2313   *  --  132* 134  --  130* 125* 131* 140  --  135   POTASSIUM 5.1 5.8* 5.6* 4.6  --  4.8 5.3 4.8 4.2  --  4.0    CHLORIDE 104  --  103 104  --  101 94 97 105  --  101   CO2 23  --  24 22  --  25 25 26 30  --  26   ANIONGAP 6  --  5 7  --  4 6 8 5  --  7   BUN 49*  --  44* 37*  --  47* 39* 46* 41*  --  50*   CR 2.75*  --  2.48* 2.37*  --  2.24* 1.85* 2.31* 1.93*  --  1.60*   GFRESTIMATED 23*  --  26* 28*  --  30* 38* 29* 36*  --  45*   *  --  258* 133*  --  157* 487* 285* 128*  --  115*   A1C  --   --   --   --   --   --   --   --   --   --  5.1   QAMAR 7.9*  --  8.1* 7.9*  --  8.3* 8.6 8.6 8.2*  --  9.1   PHOS  --   --   --   --   --  2.8  --  3.7 3.7   < > 3.8   MAG 1.8  --   --   --    < > 2.2  --  1.9 1.7  --  2.3   LACT  --   --   --  1.0  --  0.7 1.4  --   --    < >  --    PCAL  --   --   --  0.18  --   --  0.30  --   --    < >  --    FGTL  --   --   --   --   --   --  53  --   --    < >  --    CKT  --   --   --   --   --   --   --  31 49  --   --     < > = values in this interval not displayed.     Hepatic Studies    Recent Labs   Lab Test 07/10/21  0624 07/08/21  1533 07/05/21  0642 06/30/21  0339   BILITOTAL  --  0.3 0.2 0.5   DBIL  --   --  0.1 0.2   ALKPHOS  --  292* 266* 165*   PROTTOTAL 5.3* 6.3* 5.3* 5.1*   ALBUMIN  --  2.6* 2.3* 2.2*   AST  --  Canceled, Test credited 26 10   ALT  --  55 50 30     --   --  184     Pancreatitis testing    Recent Labs   Lab Test 05/26/21  1340 05/04/21  2313 11/05/20  0907 08/05/20  0335   AMYLASE 49 93  --   --    LIPASE 25*  --   --  168   TRIG  --   --  91  --      Gout Labs      Recent Labs   Lab Test 03/12/21  0605 02/01/21  1127 01/09/21  0538 12/21/20  1133 11/30/20  0808   URIC 4.8 3.9 5.0 4.5 3.9     Hematology Studies      Recent Labs   Lab Test 07/11/21  0710 07/10/21  0624 07/09/21  0632 07/08/21  1746 07/08/21  1533 07/07/21  0858   WBC 7.8 8.3 7.1 8.3 Canceled, Test credited 5.6   ANEU  --   --   --  7.7 Canceled, Test credited 3.5   ALYM  --   --   --  0.3* Canceled, Test credited 1.3   VIJAY  --   --   --  0.1 Canceled, Test credited 0.5   AEOS  --   --    --  0.0 Canceled, Test credited 0.1   HGB 8.7* 8.0* 9.3* 8.9* Canceled, Test credited 9.4*   HCT 28.4* 26.0* 30.4* 28.9* Canceled, Test credited 30.9*    314 292 281 Canceled, Test credited 276     Clotting Studies    Recent Labs   Lab Test 07/10/21  0929 06/30/21  0339 05/12/21  0622 05/10/21  0330 05/06/21  0650   INR 1.06 1.27* 1.19* 1.33* 1.45*   PTT  --   --   --   --  35     Iron Testing    Recent Labs   Lab Test 07/11/21  0710 05/29/21  0606 05/13/21  0534 02/09/21  0518 01/22/21  0958 01/21/21  1350   IRON  --   --  38 28*  --  36   FEB  --   --  215* 285  --  381   IRONSAT  --   --  18 10*  --  10*   VITA  --   --  98 33  --   --    MCV 93 96 94  --    < >  --    FOLIC  --   --  14.4  --   --   --    B12  --   --  734  --   --   --    HAPT  --  110  --   --   --   --     < > = values in this interval not displayed.     Arterial Blood Gas Testing    Recent Labs   Lab Test 06/28/21  2247 05/09/21  0956 05/09/21  0902 05/09/21  0344 05/07/21  1900 05/07/21  1700 05/07/21  1420 05/07/21  0340 05/06/21  2152   PH  --   --   --   --  7.40 7.38 7.39 7.38 7.37   PCO2  --   --   --   --  38 41 40 41 44   PO2  --   --   --   --  101 102 130* 153* 148*   HCO3  --   --   --   --  23 24 24 24 25   O2PER 21.0 21 21 REPORT AMENDED: 21.0 2LNC Canceled, Test credited  2L 40 40.0  40.0 40  40     Thyroid Studies     Recent Labs   Lab Test 07/09/21  0632 02/01/21  1127 12/18/20  1018 06/24/20  0747 07/31/19  1050   TSH 3.16 3.52 3.45 1.30 1.23     Urine Studies     Recent Labs   Lab Test 07/09/21  0640 06/30/21  0640 05/25/21  1230 05/16/21  0528 05/15/21  1730 05/07/21  0804   URINEPH 5.5 5.0 5.5 5.5 Unsatisfactory specimen - collected in wrong container 5.0   NITRITE Negative Negative Negative Negative Unsatisfactory specimen - collected in wrong container Negative   LEUKEST Negative Negative Negative Negative Unsatisfactory specimen - collected in wrong container Negative   WBCU 3 4 4 1  --  8*     Medication  levels    Recent Labs   Lab Test 07/11/21  0710 05/08/21 2004   VANCOMYCIN  --  18.5   TACROL 11.4  --      Body fluid stats    Recent Labs   Lab Test 07/10/21  1507 07/08/21  2114 06/29/21  1710   FTYP Pleural fluid Canceled, Test credited Pleural fluid   FCOL Red  --  Bloody   FAPR Cloudy  --  Turbid   FWBC 384  --  770   FNEU 52  --  42   FLYM 28  --  42   FMONO 20  --  16   FTP 2.7  --  2.9   GS  --  No organisms seen  Many  WBC'S seen  predominantly PMN's   No organisms seen  Few  WBC'S seen    Many  Red blood cells seen       Microbiology:    Fungal testing  Recent Labs   Lab Test 06/29/21  1412 06/28/21  2208 05/25/21  1627   ASPI None Detected  --   --    FGTL  --  53 <31   ASPGAI  --  0.06 0.08   ASPGAA  --  Negative Negative     Last Culture results with specimen source  Culture Micro   Date Value Ref Range Status   07/09/2021 No growth  Final   07/08/2021 (A)  Preliminary    On day 2, isolated in broth only:  Gram positive cocci in clusters  Susceptibility testing in progress     07/08/2021   Preliminary    Critical Value/Significant Value, preliminary result only, called to and read back by  Neli Boswell RN 1527 7/10/21 AM      07/08/2021 Culture negative monitoring continues  Preliminary   07/08/2021 No growth after 3 days  Preliminary   07/08/2021 No growth after 3 days  Preliminary   06/30/2021 No growth  Final   06/30/2021 No growth  Final   06/29/2021 No growth  Final   06/29/2021 No anaerobes isolated  Final   06/29/2021 Culture negative after 1 week  Preliminary   06/29/2021   Preliminary    Culture received and in progress.  Positive AFB results are called as soon as detected.    Final report to follow in 7 to 8 weeks.     06/29/2021   Preliminary    Assayed at [facility], [address] [place] [id number] [phone]   06/29/2021 No growth after 10 days  Preliminary   06/28/2021 No growth  Final   06/28/2021 (A)  Final    Cultured on the 2nd day of incubation:  Staphylococcus  epidermidis     06/28/2021   Final    Critical Value/Significant Value, preliminary result only, called to and read back by  Lisa Rush RN 6/30/21 @ 0427 TF     06/28/2021   Final    (Note)  POSITIVE for STAPHYLOCOCCUS EPIDERMIDIS and POSITIVE for the mecA  gene (resistant to methicillin) by X3M Gamesigene multiplex nucleic acid  test. Final identification and antimicrobial susceptibility testing  will be verified by standard methods.    Specimen tested with Verigene multiplex, gram-positive blood culture  nucleic acid test for the following targets: Staph aureus, Staph  epidermidis, Staph lugdunensis, other Staph species, Enterococcus  faecalis, Enterococcus faecium, Streptococcus species, S. agalactiae,  S. anginosus grp., S. pneumoniae, S. pyogenes, Listeria sp., mecA  (methicillin resistance) and Lucía/B (vancomycin resistance).    Critical Value/Significant Value called to and read back by Darcie Saeed RN at 0701 6.30.21. amd     05/26/2021 No growth  Final    Specimen Description   Date Value Ref Range Status   07/09/2021 Midstream Urine  Final   07/08/2021 Synovial fluid Knee  Final   07/08/2021 Synovial fluid  Final   07/08/2021 Synovial fluid  Final   07/08/2021 Blood Right Hand  Final   07/08/2021 Blood Right Hand  Final   06/30/2021 Blood Right Hand  Final   06/30/2021 Blood Left Hand  Final   06/30/2021 Urine  Final   06/30/2021 Urine  Final   06/29/2021 Pleural fluid  Final   06/29/2021 Pleural fluid  Final   06/29/2021 Pleural fluid  Final   06/29/2021 Pleural fluid  Final   06/29/2021 Pleural fluid  Final   06/29/2021 Pleural fluid  Final   06/29/2021 Blood  Final   06/29/2021 Blood Right Hand  Final   06/29/2021 Nares  Final        Last check of C difficile  No results found for: CDBPCT    Syphilis Testing    Treponema Antibodies   Date Value Ref Range Status   09/10/2020 Nonreactive NR^Nonreactive Final     Comment:     Methodology Change: Test performed on the Dia[employer] Liaison XL by Treponema    pallidum Total Antibodies Assay as of 3.17.2020.       Quantiferon testing   Recent Labs   Lab Test 07/08/21  1746 07/08/21  1533 06/29/21  1710 09/10/20  0830   TBRST  --   --   --  Negative   LYMPH 4.2 Canceled, Test credited  --   --    AFBSMS  --   --  Negative for acid fast bacteria  Assayed at [facility] [address] [place] [address] [phone]  --      Virology:    Coronavirus-19 testing    Recent Labs   Lab Test 07/08/21  1627 06/28/21  2212 06/17/21  0902 06/12/21  1056 01/04/21  0947 09/17/20  1600   [id number] Nasopharyngeal Nasopharyngeal Nasopharyngeal Nasopharyngeal Nasopharyngeal Nasopharyngeal   SARSCOVRES NEGATIVE NEGATIVE NEGATIVE NEGATIVE NEGATIVE NEGATIVE   [id number]  --   --  Nasopharyngeal Nasopharyngeal Nasopharyngeal Nasopharyngeal   [id number]  --   --  Test received-See reflex to IDDL test SARS CoV2 (COVID-19) Virus RT-PCR Test received-See reflex to IDDL test SARS CoV2 (COVID-19) Virus RT-PCR Test received-See reflex to IDDL test SARS CoV2 (COVID-19) Virus RT-PCR Test received-See reflex to IDDL test SARS CoV2 (COVID-19) Virus RT-PCR     Respiratory virus (non-coronavirus-19) testing    Recent Labs   Lab Test 06/29/21  0914 03/05/21  1655 02/28/21  1020 01/15/20  2210   AFLU  --   --   --  Negative   IFLUA Not Detected Not Detected Not Detected Not Detected   INFZA  --   --  Negative  --    FLUAH1 Not Detected Not Detected Not Detected Not Detected   FU2160 Not Detected Not Detected Not Detected Not Detected   FLUAH3 Not Detected Not Detected Not Detected Not Detected   BFLU  --   --   --  Positive*   IFLUB Not Detected Not Detected Not Detected Detected, Abnormal Result*   INFZB  --   --  Negative  --    PIV1 Not Detected Not Detected Not Detected Not Detected   PIV2 Not Detected Not Detected Not Detected Not Detected   PIV3 Not Detected Not Detected Not Detected Not Detected   PIV4 Not Detected Not Detected Not Detected Not Detected   RSVA Not Detected Not Detected  Not Detected Not Detected   RSVB Not Detected Not Detected Not Detected Not Detected   HMPV Not Detected Not Detected Not Detected Not Detected   ADENOV Not Detected Not Detected Not Detected Not Detected   CORONA Not Detected Not Detected Not Detected Not Detected     CMV viral loads    Recent Labs   Lab Test 06/29/21  1412 06/28/21  2208 06/07/21  0807   CSPEC Plasma Canceled, Test credited Plasma   CMVLOG Not Calculated Canceled, Test credited Not Calculated     Log IU/mL of CMVQNT   Date Value Ref Range Status   06/29/2021 Not Calculated <2.1 [Log_IU]/mL Final   06/28/2021 Canceled, Test credited <2.1 [Log_IU]/mL Final     Comment:     Quantity not sufficient  NOTIFIED VIA ED TRACK BOARD AT 2355 ON 6/28/21 ELK     06/07/2021 Not Calculated <2.1 [Log_IU]/mL Final     EBV DNA Copies/mL   Date Value Ref Range Status   06/28/2021 EBV DNA Not Detected EBVNEG^EBV DNA Not Detected [Copies]/mL Final   06/07/2021 3,509 (A) EBVNEG^EBV DNA Not Detected [Copies]/mL Final     Hepatitis B Testing     Recent Labs   Lab Test 06/07/21  0807 05/04/21  2313 09/10/20  0830   AUSAB  --  0.00 0.00   HBCAB Nonreactive Nonreactive Nonreactive   HEPBANG Nonreactive Nonreactive Nonreactive     Hepatitis C Antibody   Date Value Ref Range Status   06/07/2021 Nonreactive NR^Nonreactive Final     Comment:     Assay performance characteristics have not been established for newborns,   infants, and children     05/04/2021 Nonreactive NR^Nonreactive Final     Comment:     Assay performance characteristics have not been established for newborns,   infants, and children       CMV Antibody IgG   Date Value Ref Range Status   05/04/2021 >8.0 (H) 0.0 - 0.8 AI Final     Comment:     Positive  Antibody index (AI) values reflect qualitative changes in antibody   concentration that cannot be directly associated with clinical condition or   disease state.     09/10/2020 >8.0 (H) 0.0 - 0.8 AI Final     Comment:     Positive  Antibody index (AI) values  reflect qualitative changes in antibody   concentration that cannot be directly associated with clinical condition or   disease state.       Varicella Zoster Virus Antibody IgG   Date Value Ref Range Status   09/10/2020 1.1 (H) 0.0 - 0.8 AI Final     Comment:     Positive, suggests prev. exposure and probable immunity  Antibody index (AI) values reflect qualitative changes in antibody   concentration that cannot be directly associated with clinical condition or   disease state.       EBV Capsid Antibody IgG   Date Value Ref Range Status   05/04/2021 5.4 (H) 0.0 - 0.8 AI Final     Comment:     Positive, suggests recent or past exposure  Antibody index (AI) values reflect qualitative changes in antibody   concentration that cannot be directly associated with clinical condition or   disease state.     09/10/2020 3.8 (H) 0.0 - 0.8 AI Final     Comment:     Positive, suggests recent or past exposure  Antibody index (AI) values reflect qualitative changes in antibody   concentration that cannot be directly associated with clinical condition or   disease state.       Toxoplasma Antibody IgG   Date Value Ref Range Status   09/10/2020 <3.0 0.0 - 7.1 IU/mL Final     Comment:     Negative- Absence of detectable Toxoplasma gondii IgG antibodies. A negative   result does not rule out acute infection.  The magnitude of the measured result is not indicative of the amount of   antibody present. The concentrations of anti-Toxoplasma gondii IgG in a given   specimen determined with assays from different manufacturers can vary due to   differences in assay methods and reagent specificity.       Herpes Simplex Virus Type 1 IgG   Date Value Ref Range Status   09/10/2020 >8.0 (H) 0.0 - 0.8 AI Final     Comment:     Positive.  IgG antibody to HSV-1 detected.  Antibody index (AI) values reflect qualitative changes in antibody   concentration that cannot be directly associated with clinical condition or   disease state.       Herpes Simplex  Virus Type 2 IgG   Date Value Ref Range Status   09/10/2020 <0.2 0.0 - 0.8 AI Final     Comment:     No HSV-2 IgG antibodies detected.  Antibody index (AI) values reflect qualitative changes in antibody   concentration that cannot be directly associated with clinical condition or   disease state.       Imaging:  Recent Results (from the past 48 hour(s))   POC US Guide for Thorcentesis    Impression    Moderate to large simple appearing pleural effusion. Sample sent for testing, see consult note for details   XR Chest Port 1 View    Narrative    Exam: XR CHEST PORT 1 VIEW, 7/10/2021 4:11 PM    Indication: follow-up diagnostic thoracentesis    Comparison: 7/8/2021 chest x-ray    Findings:   Portable, AP view the chest. Median sternotomy wires and retained ICD  leads are stable in position. Small decrease in size of right pleural  effusion. No left pleural effusion. No pneumothorax. No focal  consolidation. Streaky right basilar airspace opacities.       Impression    Impression: Decrease in size of right pleural effusion with overlying  airspace opacities, most suggestive of atelectasis. No discernible  pneumothorax.    I have personally reviewed the examination and initial interpretation  and I agree with the findings.    WLINER CADET MD         SYSTEM ID:  [id number]     7/10 CXR:  Decrease in size of right pleural effusion with overlying   airspace opacities, most suggestive of atelectasis. No discernible   Pneumothorax.  7/8 Left hand / finger x-rays:  No fracture or joint malalignment.  Chondrocalcinosis and capsular calcification at the second and third MCP joints.  Small ossicle adjacent to the thumb CMC joint.  7/8 CXR:  Increased right greater than left mixed interstitial and airspace opacities suggestive for pulmonary edema.  Moderate size right pleural effusion, increased from prior exam.  Stable small left pleural effusion.    6/28/21 Chest / abdm CT:  1. A small patchy opacity in the central aspect of  the right lower lobe, new since 05/26/2021. This most likely represents pneumonia.  Several additional patchy opacities that had been present within the lungs on the relatively recent comparison study have resolved.  2. Moderate-sized right pleural effusion, increased in size since comparison study. A very small left pleural effusion that had been present on the comparison study has resolved.  3. No acute abnormality identified in the abdomen or pelvis.

## 2021-07-11 NOTE — PROGRESS NOTES
"D:Up in chair for breakfast.  Medicated for lefft hand pain.   No shortness of breath.  O2 sat 98% on room air.  Up in chair after ADL's and assist with OT.  No edema.   Bactrim discontinued.  Temp 98.0   ,   ST with no ectopy.  Bp 106/80 MAP 90 .    A:Agreeable to increased activity  And working with PT/OT.  Pain well managed with po pain medication.   Rhythm is stable   No fever.  Vital signs are stable.   Condition is stable   Feeling \"a little bit stronger\".  Slowly progressing,    P:Up with assist only.   Medicate prn pain.    To IR for thoracentesis and possible right chest tube placement.  Monitor temp  Vital signs and rhythm and O2 sats.  "

## 2021-07-11 NOTE — PROGRESS NOTES
Calorie Count  Intake recorded for: 7/10  Total Kcals: 327 Total Protein: 12g  Kcals from Hospital Food: 327   Protein: 12g  Kcals from Outside Food (average):0  Protein: 0g  # Meals Ordered from Kitchen: 2 meals  # Meals Recorded: 2 meals (First - 50% fruit plate, 25% green cordova vegetables w/ white rice, green beans)     (Second - 50% beef pot roast & mashed potatoes w/ gravy, italian roasted vegetables)  # Supplements Recorded: No intake recorded

## 2021-07-11 NOTE — PLAN OF CARE
D:Pt with h/y of  NICM s/p OHT (5/6/21) postop course c/b right pleural air leak with prolonged chest tube, paroxysmal atrial fibrillation, COPD, CKDIII, DMII, and anemia. Plural Effusion R.  Admitted on 7/9 due to generalized weakness and fatigue    I: Monitored vitals and assessed pt status.     PRN:Tylenol,melatonin,tramadol.      A: A0x4. VSS, on RA ST with ,107. Afebrile.  Reported pain in his L index  ,controlled withTylenol,Ultram,hot pack with intermittent effect.On  FR2L Blood sugar elevated  at bedtime, likely not accurate since pt eat some fruits.  around 4 a.m.,MD cross cover aware. K+5.8, Insulin 10 U IV push + D50 W (charted in paper MAR due to system downtown) given at 4:35 per MD order. BS rechecked 359 recheck at 4:55,   BS at 6 a.m 185.   I/O this shift:  In: 180 [P.O.:180]  Out: 400 [Urine:400]    Temp:  [96.9  F (36.1  C)-97.9  F (36.6  C)] 97.7  F (36.5  C)  Pulse:  [] 108  Resp:  [16-18] 18  BP: (101-115)/(70-79) 115/76  SpO2:  [96 %-99 %] 98 %      P: Continue to monitor Pt status and report changes to treatment team.

## 2021-07-11 NOTE — PROGRESS NOTES
[facility]   Cardiology II Service / Advanced Heart Failure  Daily Progress Note      Patient: Jim Willingham  MRN: [id number]  Admission Date: 7/8/2021  Hospital Day # 3    Assessment and Plan:    Jim Willingham is a 66 year old male with a history of NICM s/p OHT (5/6/21) postoperative course was complicated by right pleural air leak with prolonged chest tube, paroxysmal atrial fibrillation, COPD, CKDIII, DMII, anemia who presents to the hospital for generalized weakness, fatigue and multiple falls.    Today's Plan:  - Holding bumex  today  - Diagnostic and therapeutic thoracentesis today with IR, possible chest tube placement if needed  - F/u gram stain and cultures from yesterday's thoracentesis  - Discussed with transplant ID, okay to hold off on antibiotics for now pending culture data  - F/u fungal cultures, cytology, tryiglycerides, and anerobic culture from today's thoracentesis  - Next tacrolimus level Tuesday  - Holding lovenox, will replace with heparin once restarting ppx given gfr  - OT/PT    # Status post OHT on 5/6/21, history of NICM c/b right pleural air leak w/prlonged CT, paroxysmal a fib and more recently sepsis 2/2/ CAP  # Chronic immunosuppression  * TTE 6/29/21 with normal graft function, LVEF of 60-65%  * RHC 7/5/21: Ra 18, PA 45/30/35, PCW 28, Kee CO/CI 5.4/2.7    Graft Function:   --Volume: unclear, will discuss with attending  --BP: At goal  --HR: 90s-100s, at goal    Immunosuppression:   --prednisone 15 mg daily (decreased for 7/5 biopsy)  --Cellcept 1000 mg BID (decreased for infections)  --tacrolimus 4.5 mg in the AM and 4 mg in the PM (goal decreased to8-10 d/t renal function and infection), decreased on 7/10, recheck Tuesday  Serostatus: CMV: D+/R+, EBV: D+/R+, Toxo D-/R-    Prophylaxis:   --CAV: aspirin 81 mg and rosuvastatin 10 mg daily  --Thrush: Nystatin   --PCP: HOLDING bactrim given renal dysfunction, will give pentamidine 7/11, due next in 28 days  --GI:  Protonix   --Osteoporosis: calcium/vitamin D   --CMV: D+/R+, Valcyte 450 mg every other day x 3 mos (D+/R+ or D-/R+)     Moderate pleural effusion  Prior R pleural air leak s/p prolonged chest tube  Recent CAP, s/p cefepime -> levaquin course ended 7/4  No pulmonary symptoms, denies cough or increased sputum production. Has remained afebrile, WBC 8.3, and lack of other symptoms. However, given immunocompromised status, moderate pleural effusion is concerning. Diagnostic thoracetnesis on 7/10 with exudate, non-purulant. Gram stain negative.    Pending tests  - Aerobic pleural culture from 7/10, NGTD  - Fungal pleural culture from 7/10, NGTD  - Aerobic pleural culture from 7/11  - Anerobic pleural culture from 7/11  - Acid Fast and Mycobacterial stains and culture from 7/11  - Triglycerides from 7/11    Other:  - Diagnostic and theraputic thoracentesis with possible CT placement 7/11 with IR  - Needs f/u chest CT 7/14-7/14 given recent pna to r/o fungal pna  - Holding off on abx for now     Generalized weakness  Mechanical falls  3 day history of generalized fatigue after discharge on 7/5/21 after treatment for CAP and sepsis. TSH WNL.With no other symptoms, weakness is likely 2/2 hypervolemia, deconditioning, possible malnutrition  - PT/OT consult  - Nutrition consult     MCP pseudogout  Xray of L hand with chondrocalcinosis and capsular calcification at the second and third MCP joints. Synovial fluid analysis negative for gram stain (+ WBC, but no organisms, pseudogout crystals). Culture with staph epi, thought to be a contaminent given radha in broth only on second day of monitoring as well as pseudogout crystals also present in the fluid and improvement on anakinra.   - Started on anankira 100 mg every other day for 3 doses   - Continue PTA allopurinol     CKD Stage III. Cr worsening this admission. Likely multifactorial fluid status and medication induced.  - Holding bactrim  - Decreasing valcyte  - Decreasing  "tacrolimus goal     DM II  - PTA insulin regimen      Depression  - PTA Wellbutrin 75 BID  - PTA Amitriptyline 25 at bedtime     COPD   - PTA Monetlukast, Trelegy Ellipta, Albuterol    Diet: Cardiac diet  DVT Prophylaxis: Holding for thoracentesis  Russell Catheter: Not present  Code Status: Full    Didi Butler PA-C  Advanced Heart Failure/Cardiology II Service  Pager [phone] [facility] [id number]    ================================================================    Subjective/24-Hr Events:   Last 24 hr care team notes reviewed. Feeling about the same today. Still feeling weak. Appetite is better. Breathing feels fine. Finger joints is swollen, but improving, better movement and less pain in the MCP joints.  No lightheadedness, dizziness, presyncope or syncope. No chest pain. No abdominal pain. No dysuria. No fevers or chills, no night sweats.    ROS:  4 point ROS including respiratory, CV, GI and  (other than that noted in the HPI) is negative.     Medications: Reviewed in EPIC.     Physical Exam:   /76 (BP Location: Right arm)   Pulse 101   Temp 98  F (36.7  C) (Oral)   Resp 18   Ht 1.727 m (5' 8\")   Wt 87.5 kg (192 lb 14.4 oz)   SpO2 99%   BMI 29.33 kg/m      GENERAL: Appears comfortable, in no distress.  HEENT: Eye symmetrical, no discharge or icterus bilaterally. Mucous membranes moist and without lesions.  NECK: Supple, JVD >14.   CV: RRR, +S1S2, no murmur, rub, or gallop.   RESPIRATORY: Respirations regular, even, and unlabored. Lungs CTA throughout.    GI: Soft and non distended with normoactive bowel sounds present in all quadrants. No tenderness, rebound, guarding.   EXTREMITIES: No peripheral edema. 2+ bilateral pedal pulses. All extremities are warm and well perfused  NEUROLOGIC: Alert and interacting appropriatly. No focal deficits.   MUSCULOSKELETAL: No joint swelling or tenderness.   SKIN: No jaundice. No rashes or lesions.     Labs:  CMP  Recent Labs   Lab 07/11/21  0710 " 07/10/21  2031 07/10/21  1741 07/10/21  0624 07/09/21  0632 07/08/21  1533 07/05/21  0642   *  --  132* 135 136 134 132*   POTASSIUM 5.1 5.8* 5.6* 4.6 4.8 4.6 4.8   CHLORIDE 104  --  103 105 108 104 105   CO2 23  --  24 23 23 22 22   ANIONGAP 6  --  5 6 5 7 5   *  --  258* 77 122* 133* 114*   BUN 49*  --  44* 46* 41* 37* 30   CR 2.75*  --  2.48* 2.40* 2.33* 2.37* 2.25*   GFRESTIMATED 23*  --  26* 27* 28* 28* 30*   GFRESTBLACK 27*  --  31* 32* 33* 32* 34*   QAMAR 7.9*  --  8.1* 7.8* 7.5* 7.9* 8.4*   MAG 1.8  --   --  1.8 1.6  --  1.8   PROTTOTAL  --   --   --  5.3*  --  6.3* 5.3*   ALBUMIN  --   --   --   --   --  2.6* 2.3*   BILITOTAL  --   --   --   --   --  0.3 0.2   ALKPHOS  --   --   --   --   --  292* 266*   AST  --   --   --   --   --  Canceled, Test credited 26   ALT  --   --   --   --   --  55 50       CBC  Recent Labs   Lab 07/11/21  0710 07/10/21  0624 07/09/21  0632 07/08/21  1746   WBC 7.8 8.3 7.1 8.3   RBC 3.06* 2.80* 3.29* 3.06*   HGB 8.7* 8.0* 9.3* 8.9*   HCT 28.4* 26.0* 30.4* 28.9*   MCV 93 93 92 94   MCH 28.4 28.6 28.3 29.1   MCHC 30.6* 30.8* 30.6* 30.8*   RDW 15.6* 15.9* 15.9* 15.9*    314 292 281       INR  Recent Labs   Lab 07/10/21  0929   INR 1.06       Time/Communication  I personally spent a total of 30 minutes. Of that >15 minutes was counseling/coordination of patient's care. Plan of care discussed with patient. See my note above for details.    Patient discussed with Dr. Montgomery.

## 2021-07-11 NOTE — SEDATION DOCUMENTATION
[facility] Procedure Note        Sedation:      Performed by: Lyndon Maynard MD  Authorized by: Lyndon Maynard MD    Pre-Procedure Assessment done at 1500.    Expected Level:  Minimal Sedation    Indication:  Sedation is required to allow for Chest tube placement    Consent obtained from patient after discussing the risks, benefits and alternatives.    PO Intake:  Not NPO. procedure to be performed with local and fentanyl only    ASA Class:  Class 2 - MILD SYSTEMIC DISEASE, NO ACUTE PROBLEMS, NO FUNCTIONAL LIMITATIONS.    Mallampati:  Grade 1:  Soft palate, uvula, tonsillar pillars, and posterior pharyngeal wall visible    Lungs: Lungs Clear with good breath sounds bilaterally.     Heart: Normal heart sounds and rate    History and physical reviewed and no updates needed. I have reviewed the lab findings, diagnostic data, medications, and the plan for sedation. I have determined this patient to be an appropriate candidate for the planned sedation and procedure and have reassessed the patient IMMEDIATELY PRIOR to sedation and procedure.

## 2021-07-11 NOTE — PROCEDURES
[facility]    Procedure: IR Procedure Note    Date/Time: 7/11/2021 3:43 PM  Performed by: Lyndon Maynard MD  Authorized by: Dayanna Velazquez MD   IR Fellow Physician:  Radiology Resident Physician: Lyndon Maynard      UNIVERSAL PROTOCOL   Site Marked: NA  Prior Images Obtained and Reviewed:  Yes  Required items: Required blood products, implants, devices and special equipment available    Patient identity confirmed:  Verbally with patient, arm band, provided demographic data and hospital-assigned identification number  Patient was reevaluated immediately before administering moderate or deep sedation or anesthesia  Confirmation Checklist:  Patient's identity using two indicators, relevant allergies, procedure was appropriate and matched the consent or emergent situation and correct equipment/implants were available  Time out: Immediately prior to the procedure a time out was called    Universal Protocol: the Joint Commission Universal Protocol was followed    Preparation: Patient was prepped and draped in usual sterile fashion           ANESTHESIA    Anesthesia: Local infiltration  Local Anesthetic:  Lidocaine 1% without epinephrine      SEDATION    Patient Sedated: Yes    Sedation Type:  Anxiolysis  Sedation:  Fentanyl  Vital signs: Vital signs monitored during sedation    See dictated procedure note for full details.  Findings: Successful placement of a right-sided nonlocking 12 [place] Flexima chest tube under ultrasound guidance.    Specimens: fluid and/or tissue for gram stain and culture, fluid and/or tissue for laboratory analysis and culture and fluid and/or tissue for laboratory analysis    Complications: None    Condition: Stable    Plan: Patient to return to the floor with chest tube to suction.    PROCEDURE   Patient Tolerance:  Patient tolerated the procedure well with no immediate complications  Describe Procedure: Successful placement of a right-sided  nonlocking 12 [place] Flexima chest tube under ultrasound guidance.    Length of time physician/provider present for 1:1 monitoring during sedation: 20

## 2021-07-11 NOTE — CONSULTS
INTERVENTIONAL RADIOLOGY CONSULT NOTE    Reason for referral:   Right sided chest tube placement.    History:   (5/6/21) postop course c/b right pleural air leak with prolonged chest tube, paroxysmal atrial fibrillation, COPD, CKDIII, DMII, and anemia. Plural Effusion R.  Admitted on 7/9 due to generalized weakness and fatigue found to have pleural effusion on the right. Thoracentesis on 7/10/21 revealed exudative properties. Cultures pending.    Labs:  Lab Results   Component Value Date    HGB 8.7 07/11/2021     Lab Results   Component Value Date     07/11/2021     Lab Results   Component Value Date    WBC 7.8 07/11/2021       Lab Results   Component Value Date    INR 1.06 07/10/2021       Lab Results   Component Value Date    PROTTOTAL 5.3 07/10/2021      Lab Results   Component Value Date    ALBUMIN 2.6 07/08/2021     Lab Results   Component Value Date    BILITOTAL 0.3 07/08/2021     No results found for: BILICONJ   Lab Results   Component Value Date    ALKPHOS 292 07/08/2021     Lab Results   Component Value Date    AST Canceled, Test credited 07/08/2021     Lab Results   Component Value Date    ALT 55 07/08/2021       Lab Results   Component Value Date    CR 2.75 07/11/2021     Lab Results   Component Value Date    BUN 49 07/11/2021       Imaging:   CXR        Assessment:   Jim Willingham is a 66 year old male with a history of heart transplantation who is having generalize weakness and fatigue. He was found to have a right sided pleural effusion which was exudative on thoracentesis studies. IR consulted to place right sided chest tube.    Plan:   IR to place right sided chest tube 7/11/21

## 2021-07-12 ENCOUNTER — APPOINTMENT (OUTPATIENT)
Dept: OCCUPATIONAL THERAPY | Facility: CLINIC | Age: 66
DRG: 186 | End: 2021-07-12
Payer: COMMERCIAL

## 2021-07-12 ENCOUNTER — APPOINTMENT (OUTPATIENT)
Dept: CARDIOLOGY | Facility: CLINIC | Age: 66
DRG: 186 | End: 2021-07-12
Attending: PHYSICIAN ASSISTANT
Payer: COMMERCIAL

## 2021-07-12 ENCOUNTER — APPOINTMENT (OUTPATIENT)
Dept: GENERAL RADIOLOGY | Facility: CLINIC | Age: 66
DRG: 186 | End: 2021-07-12
Payer: COMMERCIAL

## 2021-07-12 LAB
ALBUMIN SERPL-MCNC: 2.5 G/DL (ref 3.4–5)
ALP SERPL-CCNC: 324 U/L (ref 40–150)
ALT SERPL W P-5'-P-CCNC: 79 U/L (ref 0–70)
ANION GAP SERPL CALCULATED.3IONS-SCNC: 8 MMOL/L (ref 3–14)
ANION GAP SERPL CALCULATED.3IONS-SCNC: 9 MMOL/L (ref 3–14)
APPEARANCE FLD: CLEAR
AST SERPL W P-5'-P-CCNC: 100 U/L (ref 0–45)
BACTERIA SPEC CULT: ABNORMAL
BACTERIA SPEC CULT: ABNORMAL
BILIRUB DIRECT SERPL-MCNC: 0.1 MG/DL (ref 0–0.2)
BILIRUB SERPL-MCNC: 1.1 MG/DL (ref 0.2–1.3)
BUN SERPL-MCNC: 49 MG/DL (ref 7–30)
BUN SERPL-MCNC: 51 MG/DL (ref 7–30)
CALCIUM SERPL-MCNC: 8 MG/DL (ref 8.5–10.1)
CALCIUM SERPL-MCNC: 8 MG/DL (ref 8.5–10.1)
CHLORIDE BLD-SCNC: 102 MMOL/L (ref 94–109)
CHLORIDE BLD-SCNC: 104 MMOL/L (ref 94–109)
CO2 SERPL-SCNC: 21 MMOL/L (ref 20–32)
CO2 SERPL-SCNC: 21 MMOL/L (ref 20–32)
COLOR FLD: ABNORMAL
CREAT SERPL-MCNC: 2.26 MG/DL (ref 0.66–1.25)
CREAT SERPL-MCNC: 2.44 MG/DL (ref 0.66–1.25)
ERYTHROCYTE [DISTWIDTH] IN BLOOD BY AUTOMATED COUNT: 15.7 % (ref 10–15)
GFR SERPL CREATININE-BSD FRML MDRD: 27 ML/MIN/1.73M2
GFR SERPL CREATININE-BSD FRML MDRD: 30 ML/MIN/1.73M2
GLUCOSE BLD-MCNC: 181 MG/DL (ref 70–99)
GLUCOSE BLD-MCNC: 316 MG/DL (ref 70–99)
HCT VFR BLD AUTO: 31.6 % (ref 40–53)
HGB BLD-MCNC: 9.5 G/DL (ref 13.3–17.7)
LYMPHOCYTES NFR FLD MANUAL: 81 %
MAGNESIUM SERPL-MCNC: 1.9 MG/DL (ref 1.6–2.3)
MCH RBC QN AUTO: 28 PG (ref 26.5–33)
MCHC RBC AUTO-ENTMCNC: 30.1 G/DL (ref 31.5–36.5)
MCV RBC AUTO: 93 FL (ref 78–100)
MONOS+MACROS NFR FLD MANUAL: 9 %
NEUTS BAND NFR FLD MANUAL: 10 %
PLATELET # BLD AUTO: 330 10E3/UL (ref 150–450)
POTASSIUM BLD-SCNC: 4.9 MMOL/L (ref 3.4–5.3)
POTASSIUM BLD-SCNC: 5.2 MMOL/L (ref 3.4–5.3)
PROT SERPL-MCNC: 5.9 G/DL (ref 6.8–8.8)
RBC # BLD AUTO: 3.39 10E6/UL (ref 4.4–5.9)
SODIUM SERPL-SCNC: 131 MMOL/L (ref 133–144)
SODIUM SERPL-SCNC: 134 MMOL/L (ref 133–144)
SPECIMEN SOURCE: ABNORMAL
WBC # BLD AUTO: 4.8 10E3/UL (ref 4–11)
WBC # FLD AUTO: 330 /UL

## 2021-07-12 PROCEDURE — 250N000012 HC RX MED GY IP 250 OP 636 PS 637

## 2021-07-12 PROCEDURE — 85027 COMPLETE CBC AUTOMATED: CPT | Performed by: INTERNAL MEDICINE

## 2021-07-12 PROCEDURE — 93321 DOPPLER ECHO F-UP/LMTD STD: CPT | Mod: 26 | Performed by: INTERNAL MEDICINE

## 2021-07-12 PROCEDURE — 36415 COLL VENOUS BLD VENIPUNCTURE: CPT | Performed by: PHYSICIAN ASSISTANT

## 2021-07-12 PROCEDURE — 71045 X-RAY EXAM CHEST 1 VIEW: CPT

## 2021-07-12 PROCEDURE — 83735 ASSAY OF MAGNESIUM: CPT

## 2021-07-12 PROCEDURE — 93325 DOPPLER ECHO COLOR FLOW MAPG: CPT

## 2021-07-12 PROCEDURE — 82040 ASSAY OF SERUM ALBUMIN: CPT | Performed by: PHYSICIAN ASSISTANT

## 2021-07-12 PROCEDURE — 71045 X-RAY EXAM CHEST 1 VIEW: CPT | Mod: 26 | Performed by: RADIOLOGY

## 2021-07-12 PROCEDURE — 82247 BILIRUBIN TOTAL: CPT | Performed by: PHYSICIAN ASSISTANT

## 2021-07-12 PROCEDURE — 99232 SBSQ HOSP IP/OBS MODERATE 35: CPT | Mod: 24 | Performed by: PHYSICIAN ASSISTANT

## 2021-07-12 PROCEDURE — 93005 ELECTROCARDIOGRAM TRACING: CPT

## 2021-07-12 PROCEDURE — 250N000013 HC RX MED GY IP 250 OP 250 PS 637: Performed by: PHYSICIAN ASSISTANT

## 2021-07-12 PROCEDURE — 250N000013 HC RX MED GY IP 250 OP 250 PS 637: Performed by: INTERNAL MEDICINE

## 2021-07-12 PROCEDURE — 97535 SELF CARE MNGMENT TRAINING: CPT | Mod: GO

## 2021-07-12 PROCEDURE — 250N000013 HC RX MED GY IP 250 OP 250 PS 637: Performed by: STUDENT IN AN ORGANIZED HEALTH CARE EDUCATION/TRAINING PROGRAM

## 2021-07-12 PROCEDURE — 36415 COLL VENOUS BLD VENIPUNCTURE: CPT | Performed by: INTERNAL MEDICINE

## 2021-07-12 PROCEDURE — 214N000001 HC R&B CCU UMMC

## 2021-07-12 PROCEDURE — 94640 AIRWAY INHALATION TREATMENT: CPT

## 2021-07-12 PROCEDURE — 93325 DOPPLER ECHO COLOR FLOW MAPG: CPT | Mod: 26 | Performed by: INTERNAL MEDICINE

## 2021-07-12 PROCEDURE — 250N000009 HC RX 250: Performed by: PHYSICIAN ASSISTANT

## 2021-07-12 PROCEDURE — 250N000013 HC RX MED GY IP 250 OP 250 PS 637

## 2021-07-12 PROCEDURE — 80048 BASIC METABOLIC PNL TOTAL CA: CPT | Performed by: PHYSICIAN ASSISTANT

## 2021-07-12 PROCEDURE — 94642 AEROSOL INHALATION TREATMENT: CPT

## 2021-07-12 PROCEDURE — 93010 ELECTROCARDIOGRAM REPORT: CPT | Performed by: INTERNAL MEDICINE

## 2021-07-12 PROCEDURE — 999N000157 HC STATISTIC RCP TIME EA 10 MIN

## 2021-07-12 PROCEDURE — 250N000012 HC RX MED GY IP 250 OP 636 PS 637: Performed by: PHYSICIAN ASSISTANT

## 2021-07-12 PROCEDURE — 93308 TTE F-UP OR LMTD: CPT | Mod: 26 | Performed by: INTERNAL MEDICINE

## 2021-07-12 RX ORDER — LEVALBUTEROL INHALATION SOLUTION 0.63 MG/3ML
0.63 SOLUTION RESPIRATORY (INHALATION) EVERY 4 HOURS PRN
Status: DISCONTINUED | OUTPATIENT
Start: 2021-07-12 | End: 2021-07-19 | Stop reason: HOSPADM

## 2021-07-12 RX ORDER — HYDROXYZINE HYDROCHLORIDE 25 MG/1
25 TABLET, FILM COATED ORAL ONCE
Status: COMPLETED | OUTPATIENT
Start: 2021-07-12 | End: 2021-07-12

## 2021-07-12 RX ORDER — ALBUTEROL SULFATE 0.83 MG/ML
2.5 SOLUTION RESPIRATORY (INHALATION) EVERY 4 HOURS PRN
Status: DISCONTINUED | OUTPATIENT
Start: 2021-07-12 | End: 2021-07-12

## 2021-07-12 RX ORDER — POLYETHYLENE GLYCOL 3350 17 G/17G
17 POWDER, FOR SOLUTION ORAL 2 TIMES DAILY
Status: DISCONTINUED | OUTPATIENT
Start: 2021-07-12 | End: 2021-07-19 | Stop reason: HOSPADM

## 2021-07-12 RX ADMIN — MYCOPHENOLATE MOFETIL 1000 MG: 250 CAPSULE ORAL at 20:47

## 2021-07-12 RX ADMIN — ACETAMINOPHEN 650 MG: 325 TABLET, FILM COATED ORAL at 08:56

## 2021-07-12 RX ADMIN — POLYETHYLENE GLYCOL 3350 17 G: 17 POWDER, FOR SOLUTION ORAL at 09:38

## 2021-07-12 RX ADMIN — FLUTICASONE FUROATE AND VILANTEROL TRIFENATATE 1 PUFF: 100; 25 POWDER RESPIRATORY (INHALATION) at 08:55

## 2021-07-12 RX ADMIN — Medication 10 MG: at 21:47

## 2021-07-12 RX ADMIN — ROSUVASTATIN CALCIUM 10 MG: 10 TABLET, FILM COATED ORAL at 08:53

## 2021-07-12 RX ADMIN — BUPROPION HYDROCHLORIDE 75 MG: 75 TABLET, FILM COATED ORAL at 20:47

## 2021-07-12 RX ADMIN — ALBUTEROL SULFATE 2.5 MG: 2.5 SOLUTION RESPIRATORY (INHALATION) at 15:12

## 2021-07-12 RX ADMIN — NYSTATIN 1000000 UNITS: 500000 SUSPENSION ORAL at 08:54

## 2021-07-12 RX ADMIN — PENTAMIDINE ISETHIONATE 300 MG: 300 INHALANT RESPIRATORY (INHALATION) at 15:12

## 2021-07-12 RX ADMIN — TACROLIMUS 4.5 MG: 1 CAPSULE ORAL at 08:53

## 2021-07-12 RX ADMIN — UMECLIDINIUM 1 PUFF: 62.5 AEROSOL, POWDER ORAL at 08:55

## 2021-07-12 RX ADMIN — ALBUTEROL SULFATE 2.5 MG: 2.5 SOLUTION RESPIRATORY (INHALATION) at 17:18

## 2021-07-12 RX ADMIN — MONTELUKAST 10 MG: 10 TABLET, FILM COATED ORAL at 21:47

## 2021-07-12 RX ADMIN — Medication 50 MG: at 21:47

## 2021-07-12 RX ADMIN — GABAPENTIN 300 MG: 300 CAPSULE ORAL at 11:52

## 2021-07-12 RX ADMIN — PREDNISONE 15 MG: 5 TABLET ORAL at 08:54

## 2021-07-12 RX ADMIN — AMITRIPTYLINE HYDROCHLORIDE 25 MG: 25 TABLET, FILM COATED ORAL at 21:47

## 2021-07-12 RX ADMIN — GABAPENTIN 300 MG: 300 CAPSULE ORAL at 08:52

## 2021-07-12 RX ADMIN — GABAPENTIN 300 MG: 300 CAPSULE ORAL at 20:47

## 2021-07-12 RX ADMIN — ALLOPURINOL 200 MG: 100 TABLET ORAL at 08:52

## 2021-07-12 RX ADMIN — PANTOPRAZOLE SODIUM 40 MG: 40 TABLET, DELAYED RELEASE ORAL at 08:52

## 2021-07-12 RX ADMIN — Medication 50 MG: at 08:56

## 2021-07-12 RX ADMIN — MYCOPHENOLATE MOFETIL 1000 MG: 250 CAPSULE ORAL at 08:53

## 2021-07-12 RX ADMIN — NYSTATIN 1000000 UNITS: 500000 SUSPENSION ORAL at 16:43

## 2021-07-12 RX ADMIN — PANTOPRAZOLE SODIUM 40 MG: 40 TABLET, DELAYED RELEASE ORAL at 20:47

## 2021-07-12 RX ADMIN — NYSTATIN 1000000 UNITS: 500000 SUSPENSION ORAL at 11:52

## 2021-07-12 RX ADMIN — BUPROPION HYDROCHLORIDE 75 MG: 75 TABLET, FILM COATED ORAL at 08:53

## 2021-07-12 RX ADMIN — VALGANCICLOVIR 450 MG: 450 TABLET, FILM COATED ORAL at 08:53

## 2021-07-12 RX ADMIN — INSULIN GLARGINE 24 UNITS: 100 INJECTION, SOLUTION SUBCUTANEOUS at 20:47

## 2021-07-12 RX ADMIN — ASPIRIN 81 MG CHEWABLE TABLET 81 MG: 81 TABLET CHEWABLE at 08:53

## 2021-07-12 RX ADMIN — TACROLIMUS 4 MG: 1 CAPSULE ORAL at 17:32

## 2021-07-12 RX ADMIN — LEVALBUTEROL HYDROCHLORIDE 0.63 MG: 0.63 SOLUTION RESPIRATORY (INHALATION) at 18:15

## 2021-07-12 RX ADMIN — HYDROXYZINE HYDROCHLORIDE 25 MG: 25 TABLET, FILM COATED ORAL at 17:29

## 2021-07-12 RX ADMIN — NYSTATIN 1000000 UNITS: 500000 SUSPENSION ORAL at 20:47

## 2021-07-12 ASSESSMENT — ACTIVITIES OF DAILY LIVING (ADL)
ADLS_ACUITY_SCORE: 15

## 2021-07-12 ASSESSMENT — MIFFLIN-ST. JEOR: SCORE: 1632.68

## 2021-07-12 NOTE — CONSULTS
"THORACIC SURGERY CONSULT NOTE    Consult Reason: Chest Tube Management    HPI: Jim Willingham is a 66 year old male with a history of NICM s/p orthotopic heart transplant (5/6/21) postoperative course was complicated by right pleural air leak with prolonged chest tube, CAP s/p 7 day course antibiotics, paroxysmal atrial fibrillation, COPD, CKDIII, DMII, anemia who presented to the hospital for generalized weakness on 7/9.  He was found to have pleural effusion on the right.  On 7/10 he underwent thoracentesis and effusion was found to be exudative. IR did therapeutic thoracentesis and placed chest tube on 7/11. No growth from fluid. Transplant ID was consulted and recommended NOT treating the exudative pleural effusion unless cultures grow a pathogen - at this time no growth. Yesterday, had >1L of output and per note was \"milky\" in appearance however has only put out 30 mL since midnight. Fluid analysis was performed demonstrating exudative effusion but gram stain and culture without growth or organisms seen. TG 28.    Currently, he feels that he is a little bit stronger today. He states that after his heart transplant, he did have the prolonged chest tube for the air leak but then after it was removed in May he did not require another chest tube until now. He recently was hospitalized with pneumonia for which he was treated with antibiotics and subsequently developed malaise and fatigue which is why he presented on 7/9. He denied feeling short of breath. Currently no chest pain, fevers, chills, nausea or vomiting. No diarrhea or constipation.           A/P: Patient is a 66 year old male with NICM s/p orthotopic heart transplant 5/6 c/b prolonged air leak, pAF, COPD, CKD, DMII with recent PNA who presented with malaise and fatigue found to have large right sided exudative pleural effusion s/p IR chest tube 7/11. Thoracic surgery is consulted for assistance with chest tube management. Large output overnight however " minimal serosanguinous output today, low concern for infection given character and fluid analysis. Also no concern for chyle leak with TG 28 and fluid serosanguinous.    - CXR today (ordered)  - Daily CXR  - Continue chest tube to suction  - Will follow    Seen and discussed with fellow and staff    Nba Gaston MD  PGY-3, General Surgery    ---------------------------------------------------------------------------------------------------------------------  PMH:  Past Medical History:   Diagnosis Date     Bariatric surgery status 2003     Benign essential hypertension 05/11/2017     Bilateral carpal tunnel syndrome 11/02/2020     Cardiomyopathy, unspecified (H) 05/08/2017     CKD (chronic kidney disease) stage 3, GFR 30-59 ml/min 05/11/2017     COPD (chronic obstructive pulmonary disease) (H) 11/02/2020     Depression 05/11/2017     Diabetes mellitus (H) 1995     Gouty arthropathy, chronic, without tophi 11/02/2020     H/O gastric bypass 05/11/2017     ICD (implantable cardioverter-defibrillator), biventricular, in situ 05/11/2017     LVAD (left ventricular assist device) present (H)      Major depression, recurrent, chronic (H) 11/02/2020     NICM (nonischemic cardiomyopathy) (H)/ EF 20% 05/11/2017    ECHO: LVEDd. 7.66 cm, Restrictive pattern , Severe mitral valve regurgitation     CECILIA (obstructive sleep apnea) 05/11/2017     Paroxysmal atrial fibrillation (H) 05/11/2017     Paroxysmal VT (H) 05/11/2017     Rotator cuff tear arthropathy of both shoulders 11/02/2020     Uncomplicated asthma      Vitamin B12 deficiency (non anemic) 05/11/2017       PSH:  Past Surgical History:   Procedure Laterality Date     ANESTHESIA CARDIOVERSION N/A 05/11/2020    Procedure: ANESTHESIA, FOR CARDIOVERSION @1100;  Surgeon: GENERIC ANESTHESIA PROVIDER;  Location: [facility] OR     CV HEART BIOPSY N/A 5/13/2021    Procedure: Heart Biopsy;  Surgeon: Scout Robins MD;  Location:  HEART CARDIAC CATH LAB     CV HEART BIOPSY N/A  5/20/2021    Procedure: Heart Biopsy;  Surgeon: Jeffrey Gibson MD;  [facility]     CV HEART BIOPSY N/A 5/27/2021    Procedure: Heart Biopsy;  Surgeon: Jac Dover MD;  Location: [facility] HEART CARD[facility]AC CATH LAB     CV HEART BIOPSY N/A 6/7/2021    Procedure: CV HEART BIOPSY;  Surgeon: Jeffrey Gibson MD;  Location: [facility] HEART CARDIAC CATH LAB     CV HEART BIOPSY N/A 6/21/2021    Procedure: CV HEART BIOPSY;  Surgeon: Scout Robins MD;  Location: [facility] HEART CARD[facility]AC [facility]ATH LAB     CV HEART BIOPSY N/A 7/5/2021    Procedure: CV HEART BIOPSY;  Surgeon: Jeffrey Gibson MD;  Location:  HEART CARDIAC CATH LAB     CV RIGHT HEART CATH MEASUREMENTS RECORDED N/A 07/24/2019    Procedure: CV RIGHT HEART CATH;  Surgeon: Renu Sears MD;  [facility]     CV RIGHT HEART CATH MEASUREMENTS RECORDED N/A 08/05/2020    Procedure: CV RIGHT HEART CATH;  Surgeon: Nicola Seth MD;  [facility]     CV RIGHT HEART CATH MEASUREMENTS RECORDED N/A 01/07/2021    Procedure: CV RIGHT HEART CATH;  Surgeon: Jac Dover MD;  Location:  HEART CARDIAC CATH LAB     CV RIGHT HEART CATH MEASUREMENTS RECORDED N/A 02/23/2021    Procedure: Heart Cath Right Heart Cath;  Surgeon: Jeffrey Gibson MD;  [facility]     CV RIGHT HEART CATH MEASUREMENTS RECORDED N/A 03/23/2021    Procedure: Heart Cath Right Heart Cath. request for 3/23;  Surgeon: Jeffrey Gibson MD;  [facility]     CV RIGHT HEART CATH MEASUREMENTS RECORDED N/A 5/13/2021    Procedure: Right Heart Cath;  Surgeon: Scout Robins MD;  Location:  HEART CARDIAC CATH LAB     CV RIGHT HEART CATH MEASUREMENTS RECORDED N/A 5/20/2021    Procedure: Right Heart Cath;  Surgeon: Jeffrey Gibson MD;  Location:  HEART CARDIAC CATH LAB     CV RIGHT HEART CATH MEASUREMENTS  RECORDED N/A 5/27/2021    Procedure: Right Heart Cath;  Surgeon: Jac Dover MD;  Location:  HEART CARDIAC CATH LAB     CV RIGHT HEART CATH MEASUREMENTS RECORDED N/A 6/7/2021    Procedure: CV RIGHT HEART CATH;  Surgeon: Jeffrey Gibson MD;  Location:  HEART CARDIAC CATH LAB     CV RIGHT HEART CATH MEASUREMENTS RECORDED N/A 6/21/2021    Procedure: CV RIGHT HEART CATH;  Surgeon: Scout Robins MD;  Location:  HEART CARDIAC CATH LAB     CV RIGHT HEART CATH MEASUREMENTS RECORDED N/A 7/5/2021    Procedure: CV RIGHT HEART CATH;  Surgeon: Jeffrey Gibson MD;  Location:  HEART CARDIAC CATH LAB     GI SURGERY  2003    [facility]     INSERT VENTRICULAR ASSIST DEVICE LEFT (HEARTMATE II) N/A 06/19/2017    Procedure: INSERT VENTRICULAR ASSIST DEVICE LEFT (HEARTMATE II);  Median Sternotomy Heartmate II Left Ventricular Assist Device Insertion on Pump Oxygenator;  Surgeon: Ronnie Quigley MD;  Location:  OR     ORTHOPEDIC SURGERY  1994    right knee wired     PICC DOUBLE LUMEN PLACEMENT Right 09/23/2020    5FR PICC DL. Length-43cm (1cm out).     PICC INSERTION - DOUBLE LUMEN Right 05/09/2021    IK/BRACH     RELEASE CARPAL TUNNEL BILATERAL Bilateral 02/18/2021    Procedure: Bilateral carpal tunnel release;  Surgeon: Jermaine Brand MD;  Location:  OR     TRANSPLANT HEART RECIPIENT N/A 05/05/2021    Procedure: Redo median sternotomy, lysis of adhesions, heart transplant recipient, on cardiopulmonary bypass, intraoperative transesophageal echocardiogram per anesthesia, Implantable Cardioverter Defibrillator (ICD) removal;  Surgeon: Ronnie Quigley MD;  Location:  OR       FH:  family history includes Cerebrovascular Disease (age of onset: 40) in his daughter; Cerebrovascular Disease (age of onset: 64) in his mother; Coronary Artery Disease in his father; Diabetes in his mother; Diabetes Type 2  in his father; Hypertension in his mother; Obesity in his brother and  brother.    SH:  Social History     Socioeconomic History     Marital status:      Spouse name: Not on file     Number of children: Not on file     Years of education: Not on file     Highest education level: Not on file   Occupational History     Not on file   Tobacco Use     Smoking status: Former Smoker     Quit date:      Years since quittin.5     Smokeless tobacco: Never Used   Substance and Sexual Activity     Alcohol use: No     Drug use: No     Sexual activity: Yes     Partners: Female   Other Topics Concern     Parent/sibling w/ CABG, MI or angioplasty before 65F 55M? No   Social History Narrative     Not on file     Social Determinants of Health     Financial Resource Strain:      Difficulty of Paying Living Expenses:    Food Insecurity:      Worried About Running Out of Food in the Last Year:      Ran Out of Food in the Last Year:    Transportation Needs:      Lack of Transportation (Medical):      Lack of Transportation (Non-Medical):    Physical Activity:      Days of Exercise per Week:      Minutes of Exercise per Session:    Stress:      Feeling of Stress :    Social Connections:      Frequency of Communication with Friends and Family:      Frequency of Social Gatherings with Friends and Family:      Attends Confucianism Services:      Active Member of Clubs or Organizations:      Attends Club or Organization Meetings:      Marital Status:    Intimate Partner Violence:      Fear of Current or Ex-Partner:      Emotionally Abused:      Physically Abused:      Sexually Abused:          Allergies:  Allergies   Allergen Reactions     Grass Shortness Of Breath     Ace Inhibitors Cough     Dust Mites Other (See Comments)     Asthma     Mold Other (See Comments)     Asthma     Penicillins Other (See Comments)     Unknown - childhood exposure    Tolerated Zosyn -2020     Sulfa Drugs Other (See Comments) and Unknown     Unknown childhood reaction       Home Meds:    ROS: 10 point ROS  negative unless otherwise stated in HPI    Physical Exam:  Temp:  [97.6  F (36.4  C)-98  F (36.7  C)] 97.9  F (36.6  C)  Pulse:  [] 108  Resp:  [18-20] 20  BP: (105-131)/(68-95) 105/68  SpO2:  [97 %-100 %] 97 %    Gen: NAD, resting comfortably in bed  Lungs: Non labored breathing on RA, left posterior pigtail chest tube with serosanguinous output, no air leak, site c/d/i  CV: regular rhythm, normal rate  Abd: soft, nt, nd  Ext: wwp  Neuro: AOx3    Output: 1190 mL / 30 mL      Labs:  ABG No lab results found in last 7 days.  CBC  Recent Labs   Lab 07/11/21  0710 07/10/21  0624 07/09/21  0632 07/08/21  1746   WBC 7.8 8.3 7.1 8.3   HGB 8.7* 8.0* 9.3* 8.9*    314 292 281     BMP  Recent Labs   Lab 07/11/21  2240 07/11/21  1643 07/11/21  0710 07/10/21  2031 07/10/21  1741 07/10/21  0624   NA  --  131* 132*  --  132* 135   POTASSIUM  --  5.3 5.1 5.8* 5.6* 4.6   CHLORIDE  --  102 104  --  103 105   CO2  --  21 23  --  24 23   BUN  --  49* 49*  --  44* 46*   CR  --  2.70* 2.75*  --  2.48* 2.40*   * 184* 125*  --  258* 77     LFT  Recent Labs   Lab 07/10/21  0929 07/08/21  1533   AST  --  Canceled, Test credited   ALT  --  55   ALKPHOS  --  292*   BILITOTAL  --  0.3   ALBUMIN  --  2.6*   INR 1.06  --      PancreasNo lab results found in last 7 days.

## 2021-07-12 NOTE — PROGRESS NOTES
"CLINICAL NUTRITION SERVICES - BRIEF NOTE     Nutrition Prescription    RECOMMENDATIONS FOR MDs/PROVIDERS TO ORDER:  Consider an appetite stimulant as appropriate   Consider TFs if consuming less than 1225 kcals and 60 g protein daily. See future/additional recs below.     Malnutrition Status:    See previous RD note     Recommendations already ordered by Registered Dietitian (RD):  Calorie count    Future/Additional Recommendations:  If TFs are needed, rec place feeding tube (FT) via radiology due to RNY hx and initiate TFs, order Osmolite 1.5 (concentrated, maintenance TF formula), and order Prosource TF modular (1 pkt daily). Initiate TFs at 15 mL/hr, advancing rate by 10 mL Q 8 hrs (or per provider discretion, pending hemodynamic stability) to goal rate of 60 mL/hr. Osmolite 1.5 Pete at goal of 60 mL/hr (1440ml/day) will provide: 2160+ kcals, 90+ g PRO, 1097 ml free H20, 293 g CHO, and 0 g fiber daily. Each packet of ProSource TF modular provides 40 kcals and 11 g protein.                  If begin tube feeds:               - Flush FT with 30 mL water Q 4 hrs for patency. Rec provider adjust free water flushes as needed, pending fluid status.              - Ensure K+/Mg++/Phos labs are ordered daily until TFs advance to goal infusion to evaluate for sx of refeeding with nutrition received. If lytes trend low, aggressively replace lytes.                  - If not already ordered, order a multivitamin/mineral (certavite 15 mL/day via FT) to help ensure micronutrient needs being met with suspected hypermetabolic demands and potential interruptions to TF infusions.              - Monitor TF and possible need to adjust nutrition support regimen if necessary, pending medical course and nutrition status.                  - Monitor need to check a metabolic cart study.                - Send a nutrition consult for \"Registered Dietitian to Order TF per Medical Nutrition Therapy Guidelines\" if desire RD to order TFs.        "        - If K+ trends high and if needs Novasource Renal TF (lower in K+), then rec a goal rate of 45 mL/hr.        Findings  7/9        Total Kcals: 1289     Total Protein: 28g-4 meals ordered, 2 recorded, 10)% of 2 supplements  7/10      Total Kcals: 327       Total Protein: 12g-2 meals ordered, 2 recorded, no supplements recorded   7/11       Total Kcals: 433       Total Protein: 19g-2 meals ordered, 1 recorded, no supplements recorded     3 day average PO intake = 683 kcal (37% minimum energy needs) and 20 g protein (22% minimum protein needs)     Pt believes his appetite/intakes have been improving since admit. Stated he has been drinking 90% of Glucerna and 100% of special K supplements. Unable to fully assess calorie count d/t missing data. Pt believes he can increase intakes, wants to try another calorie count before other interventions (nutriton support) are considered. Pt did not want to make any adjustments to supplements at this time.       INTERVENTIONS  Implementation  Calorie count  Encouraged intakes of meals and supplements. Discussed higher calorie/higher protein menu items to order. Encouraged frequent meals/snacks       Follow up/Monitoring  Progress toward goals will be monitored and evaluated per protocol.       Rajani Briones MS, RD, LDN  Unit Pager [phone]

## 2021-07-12 NOTE — PLAN OF CARE
D: Pt with h/y of  NICM s/p OHT (5/6/21) postop course c/b right pleural air leak with prolonged chest tube, paroxysmal atrial fibrillation, COPD, CKDIII, DMII, and anemia. Plural Effusion R.  Admitted on 7/9 due to generalized weakness and fatigue per MD note.     I: Monitored vitals and assessed pt status.   PRN: Melatonin, Tylenol,tramadol  Changes: Bumex hold    A: A0x4. VSS, on RA. ST/SR. Afebrile. Pain in his L index controled with Tramadol,Tylenol. CT R placed on 7/11 to - 20 suc with 30 ml output.  Dressing CDI. On 2 L FR.    I/O this shift:  In: 100 [P.O.:100]  Out: 1190 [Chest Tube:1190]    Temp:  [96.7  F (35.9  C)-98  F (36.7  C)] 97.6  F (36.4  C)  Pulse:  [101-110] 110  Resp:  [18] 18  BP: ()/(72-91) 121/91  SpO2:  [97 %-100 %] 100 %      P: Continue to monitor Pt status and report changes to treatment team.

## 2021-07-12 NOTE — PROGRESS NOTES
Calorie Count  Intake recorded for: 7/11  Total Kcals: 433 Total Protein: 19g  Kcals from Hospital Food: 433  Protein: 19g  Kcals from Outside Food (average):0 Protein: 0g  # Meals Ordered from Kitchen: 2 meals   # Meals Recorded: 1 meal (First - 100% pollock, 2 servings mashed potatoes w/ gravy, mandarin oranges)  # Supplements Recorded: 0

## 2021-07-12 NOTE — PROGRESS NOTES
[facility]   Cardiology II Service / Advanced Heart Failure  Daily Progress Note      Patient: Jim Willingham  MRN: [id number]  Admission Date: 7/8/2021  Hospital Day # 4    Assessment and Plan:    Jim Willingham is a 66 year old male with a history of NICM s/p OHT (5/6/21) postoperative course was complicated by right pleural air leak with prolonged chest tube, paroxysmal atrial fibrillation, COPD, CKDIII, DMII, anemia who presents to the hospital for generalized weakness, fatigue and multiple falls.    Today's Plan:  - Holding bumex today until labs are resulted  - F/u ECHO Today (low voltage on EKG, elevated RHC pressures but poor response to diuretics)  - Appreciate thoracics consult re: chest tube management  - Continue to follow all pleural cultures (AFB, MCB, fungal, anarobic, aerobic)  - Appreciate transplant ID consult, now signed off  - F/u fungal cultures, cytology, tryiglycerides, and anerobic culture from today's thoracentesis  - Next tacrolimus level Tuesday  - Holding bactrim given renal dysfunction  - Pentamidine q28 days  - Holding lovenox, will replace with heparin once restarting ppx given gfr  - OT/PT    # Status post OHT on 5/6/21, history of NICM c/b right pleural air leak w/prlonged CT, paroxysmal a fib and more recently sepsis 2/2/ CAP  # Chronic immunosuppression  * TTE 6/29/21 with normal graft function, LVEF of 60-65%  * RHC 7/5/21: Ra 18, PA 45/30/35, PCW 28, Kee CO/CI 5.4/2.7    Graft Function:   --Volume: holding bumex currently  --BP: At goal  --HR: 90s-100s, at goal    Immunosuppression:   --Prednisone 15 mg daily (decreased for 7/5 biopsy)  --Cellcept 1000 mg BID (decreased for infections)  --Tacrolimus 4.5 mg in the AM and 4 mg in the PM (goal decreased to8-10 d/t renal function and infection), decreased on 7/10, recheck Tuesday (ordered)  Serostatus: CMV: D+/R+, EBV: D+/R+, Toxo D-/R-    Prophylaxis:   --CAV: aspirin 81 mg and rosuvastatin 10 mg daily  --Thrush:  Nystatin   --PCP: HOLDING bactrim given renal dysfunction, will give pentamidine 7/12, due next in 28 days  --GI: Protonix   --Osteoporosis: calcium/vitamin D   --CMV: D+/R+, Valcyte 450 mg every other day x 3 mos (D+/R+ or D-/R+)     Moderate pleural effusion  Prior R pleural air leak s/p prolonged chest tube  Recent CAP, s/p cefepime -> levaquin course ended 7/4  No pulmonary symptoms, denies cough or increased sputum production. Has remained afebrile, WBC SNL, and lack of other symptoms. However, given immunocompromised status, moderate pleural effusion is concerning. Diagnostic thoracetnesis on 7/10 with exudate, non-purulant. Gram stain negative. Triglycerides not suggestive of chylothroax.    Pending tests  - Aerobic pleural culture from 7/10, NGTD  - Fungal pleural culture from 7/10, NGTD  - Aerobic pleural culture from 7/11  - Anerobic pleural culture from 7/11  - Acid Fast and Mycobacterial stains and culture from 7/11    Other:  - Thoracics consult for CT management  - Chest tube to suction  - Holding off on any antibiotics for now  - Daily CXR  - Needs f/u chest CT 7/14-7/14 given recent pna to r/o fungal pna  - Holding off on abx for now     Generalized weakness  Mechanical falls  3 day history of generalized fatigue after discharge on 7/5/21 after treatment for CAP and sepsis. TSH WNL.With no other symptoms, weakness is likely 2/2 hypervolemia, deconditioning, possible malnutrition  - PT/OT consult  - Nutrition consult     MCP pseudogout  Xray of L hand with chondrocalcinosis and capsular calcification at the second and third MCP joints. Synovial fluid analysis negative for gram stain (+ WBC, but no organisms, pseudogout crystals). Culture with staph epi, thought to be a contaminent given radha in broth only on second day of monitoring as well as pseudogout crystals also present in the fluid and improvement on anakinra.   - Started on anankira 100 mg every other day for 3 doses   - Decreased allopurinol  "for renal function from 300 to 200    CKD Stage III. Cr worsening this admission. Likely multifactorial fluid status and medication induced.  - Holding bactrim  - Renal dosing of valcyte  - Decreasing tacrolimus goal  - Decreased allopruinol from 300 ->200    Hyperkalemia  - Stopping bactrim  - Trend daily     DM II  - PTA insulin regimen      Depression  - PTA Wellbutrin 75 BID  - PTA Amitriptyline 25 at bedtime     COPD   - PTA Monetlukast, Trelegy Ellipta, Albuterol    Diet: Cardiac diet  DVT Prophylaxis: Holding for thoracentesis  Russell Catheter: Not present  Code Status: Full    Didi Butler PA-C  Advanced Heart Failure/Cardiology II Service  Pager [phone] [facility] [id number]    ================================================================    Subjective/24-Hr Events:   Last 24 hr care team notes reviewed. Feeling about the same today. Still feeling weak. Appetite is better. Breathing feels fine. MCP is greatly improved today, better movement and less pain.  No lightheadedness, dizziness, presyncope or syncope. No chest pain. No abdominal pain. No dysuria. No fevers or chills, no night sweats.    ROS:  4 point ROS including respiratory, CV, GI and  (other than that noted in the HPI) is negative.     Medications: Reviewed in EPIC.     Physical Exam:   /68 (BP Location: Right arm)   Pulse 108   Temp 97.9  F (36.6  C) (Oral)   Resp 20   Ht 1.727 m (5' 8\")   Wt 86.8 kg (191 lb 6.4 oz)   SpO2 97%   BMI 29.10 kg/m      GENERAL: Appears comfortable, in no distress.  HEENT: Eye symmetrical, no discharge or icterus bilaterally. Mucous membranes moist and without lesions.  NECK: Supple, JVD >14.   CV: RRR, +S1S2, no murmur, rub, or gallop.   RESPIRATORY: Respirations regular, even, and unlabored. Lungs CTA throughout.    GI: Soft and non distended with normoactive bowel sounds present in all quadrants. No tenderness, rebound, guarding.   EXTREMITIES: No peripheral edema. 2+ bilateral pedal pulses. All " extremities are warm and well perfused  NEUROLOGIC: Alert and interacting appropriatly. No focal deficits.   MUSCULOSKELETAL: No joint swelling or tenderness.   SKIN: No jaundice. No rashes or lesions.     Labs:  CMP  Recent Labs   Lab 07/11/21  2240 07/11/21  1643 07/11/21  0710 07/10/21  2031 07/10/21  1741 07/10/21  0624 07/09/21  0632 07/08/21  1533   NA  --  131* 132*  --  132* 135 136 134   POTASSIUM  --  5.3 5.1 5.8* 5.6* 4.6 4.8 4.6   CHLORIDE  --  102 104  --  103 105 108 104   CO2  --  21 23  --  24 23 23 22   ANIONGAP  --  8 6  --  5 6 5 7   * 184* 125*  --  258* 77 122* 133*   BUN  --  49* 49*  --  44* 46* 41* 37*   CR  --  2.70* 2.75*  --  2.48* 2.40* 2.33* 2.37*   GFRESTIMATED  --  24* 23*  --  26* 27* 28* 28*   GFRESTBLACK  --   --  27*  --  31* 32* 33* 32*   QAMAR  --  7.9* 7.9*  --  8.1* 7.8* 7.5* 7.9*   MAG  --   --  1.8  --   --  1.8 1.6  --    PROTTOTAL  --   --   --   --   --  5.3*  --  6.3*   ALBUMIN  --   --   --   --   --   --   --  2.6*   BILITOTAL  --   --   --   --   --   --   --  0.3   ALKPHOS  --   --   --   --   --   --   --  292*   AST  --   --   --   --   --   --   --  Canceled, Test credited   ALT  --   --   --   --   --   --   --  55       CBC  Recent Labs   Lab 07/11/21  0710 07/10/21  0624 07/09/21  0632 07/08/21  1746   WBC 7.8 8.3 7.1 8.3   RBC 3.06* 2.80* 3.29* 3.06*   HGB 8.7* 8.0* 9.3* 8.9*   HCT 28.4* 26.0* 30.4* 28.9*   MCV 93 93 92 94   MCH 28.4 28.6 28.3 29.1   MCHC 30.6* 30.8* 30.6* 30.8*   RDW 15.6* 15.9* 15.9* 15.9*    314 292 281       INR  Recent Labs   Lab 07/10/21  0929   INR 1.06       Time/Communication  I personally spent a total of 30 minutes. Of that >15 minutes was counseling/coordination of patient's care. Plan of care discussed with patient. See my note above for details.    Patient discussed with Dr. Montgomery.

## 2021-07-12 NOTE — PROGRESS NOTES
Orthopedic Surgery Progress Note    Assessment and Plan:   66 year old male w/ PMH nonischemic cardiomyopathy s/p heart transplant 5/6/2021, postoperative course was complicated by right pleural air leak with prolonged chest tube, paroxysmal atrial fibrillation, COPD, CKDIII, DMII, anemia, recently hospitalized 6/28 for sepsis w/ CAP, treated w/ cefepime, levaquin, azithromycin, and vancomycin w/ 1 of 2 blood cx w/ staph epi 2/2 contaminent; discharged home and re-presented to ED 7/8 for generalized weakness and fatigue w/ lightheadedness while walking; Ortho consulted for L index MCP swelling concerning for gout vs septic arthritis.      7/8 Labs:  WBC 8.3  CRP 87  ESR 72     7/8 Left index MCP aspiration results:  - Cell count: unable to obtain due to insufficient fluid  - Crystals: positive CPPD crystals.  - Gram stain: negative  - Cultures: staph epidermidis in broth only, likely contaminant     Aspiration consistent w/ pseudogout. Defer management to primary team. Staph epidermidis likely contaminant; moreover if it were intra-articular, bedside irrigation was performed at time of aspiration for source control. Patient continues clinically improving. Ortho will signoff at this time, please call/page if questions/concerns arise.     Cardiology Primary, appreciate cares  Activity: Activity as tolerated  Weight bearing status: WBAT  Imaging: none further needed  Antibiotics: per primary  Diet: okay for diet from ortho perspective     Pain management: per primary; from ortho perspective recommend tylenol, ice, ACE wrap, and elevation above the level of the heart     Follow-up: None needed from ortho perspecitve  Disposition: Per primary     D/w Dr. Childress.     Leonel Junior MD  Orthopaedic Surgery, PGY-4  Pager: [phone]    =========================================================    Subjective:   Patient doing well, reports the pain in his finger is much improved; only has mild residual tenderness.  "    Exam:  /79 (BP Location: Right arm)   Pulse 99   Temp 97.7  F (36.5  C) (Oral)   Resp 18   Ht 1.727 m (5' 8\")   Wt 86.8 kg (191 lb 6.4 oz)   SpO2 98%   BMI 29.10 kg/m    Gen: Awake, alert, NAD  Resp: non-labored breathing  CV: Extr wwp  LUE:  Swelling and erythema decreased from prior, minor residual swelling present  Fully extends index MCP, able to make full fist  Mild tenderness to palpation  Fingers WW    Labs:  Recent Labs   Lab Test 07/11/21  2240 07/11/21  1643 07/11/21  0710 07/10/21  1741 07/10/21  0929 07/10/21  0624 07/09/21  0632 07/08/21  1746 07/08/21  1533 06/30/21  0339 06/29/21  0631 06/28/21  2208 05/12/21  1736 05/12/21  0622 10/30/20  0939 10/27/20  1250 10/20/20  1305   HGB  --   --  8.7*  --   --  8.0* 9.3* 8.9* Canceled, Test credited 8.9*   < > 11.3*   < > 7.3*   < > 10.3* 10.2*   SED  --   --   --   --   --   --   --  72*  --   --   --   --   --   --   --  10 8   CRP  --   --   --   --   --   --  98.0*  --  87.0*  --   --  27.0*   < >  --   --  3.5 11.0*   WBC  --   --  7.8  --   --  8.3 7.1 8.3 Canceled, Test credited 7.5   < > 5.1   < > 11.1*   < > 5.6 5.9   INR  --   --   --   --  1.06  --   --   --   --  1.27*  --   --   --  1.19*  --  1.84* 3.01*   PLT  --   --  358  --   --  314 292 281 Canceled, Test credited 184   < > 243   < > 148*   < > 280 248   CR  --  2.70* 2.75* 2.48*  --  2.40* 2.33*  --  2.37* 2.24*  --  1.85*  --  1.40*   < > 2.04* 1.80*   * 184* 125* 258*  --  77 122*  --  133* 157*  --  487*  --  180*   < > 211* 95    < > = values in this interval not displayed.     "

## 2021-07-13 ENCOUNTER — APPOINTMENT (OUTPATIENT)
Dept: CT IMAGING | Facility: CLINIC | Age: 66
DRG: 186 | End: 2021-07-13
Payer: COMMERCIAL

## 2021-07-13 ENCOUNTER — APPOINTMENT (OUTPATIENT)
Dept: PHYSICAL THERAPY | Facility: CLINIC | Age: 66
DRG: 186 | End: 2021-07-13
Payer: COMMERCIAL

## 2021-07-13 ENCOUNTER — APPOINTMENT (OUTPATIENT)
Dept: GENERAL RADIOLOGY | Facility: CLINIC | Age: 66
DRG: 186 | End: 2021-07-13
Payer: COMMERCIAL

## 2021-07-13 PROBLEM — Z95.810 ICD (IMPLANTABLE CARDIOVERTER-DEFIBRILLATOR), BIVENTRICULAR, IN SITU: Status: RESOLVED | Noted: 2017-05-11 | Resolved: 2021-07-13

## 2021-07-13 PROBLEM — J11.1 INFLUENZA: Status: RESOLVED | Noted: 2020-01-16 | Resolved: 2021-07-13

## 2021-07-13 PROBLEM — Z94.1 TRANSPLANTED HEART (H): Status: RESOLVED | Noted: 2021-02-05 | Resolved: 2021-07-13

## 2021-07-13 PROBLEM — R06.00 DYSPNEA: Status: RESOLVED | Noted: 2020-01-20 | Resolved: 2021-07-13

## 2021-07-13 PROBLEM — Z95.811 LVAD (LEFT VENTRICULAR ASSIST DEVICE) PRESENT (H): Status: RESOLVED | Noted: 2017-06-26 | Resolved: 2021-07-13

## 2021-07-13 PROBLEM — R78.81 BACTEREMIA: Status: RESOLVED | Noted: 2020-09-19 | Resolved: 2021-07-13

## 2021-07-13 PROBLEM — N17.9 ACUTE-ON-CHRONIC KIDNEY INJURY (H): Status: RESOLVED | Noted: 2020-08-05 | Resolved: 2021-07-13

## 2021-07-13 PROBLEM — R50.9 FEVER: Status: RESOLVED | Noted: 2021-06-28 | Resolved: 2021-07-13

## 2021-07-13 PROBLEM — N18.9 ACUTE-ON-CHRONIC KIDNEY INJURY (H): Status: RESOLVED | Noted: 2020-08-05 | Resolved: 2021-07-13

## 2021-07-13 PROBLEM — I50.9 DECOMPENSATED HEART FAILURE (H): Status: RESOLVED | Noted: 2021-02-08 | Resolved: 2021-07-13

## 2021-07-13 PROBLEM — I50.9 HEART FAILURE (H): Status: RESOLVED | Noted: 2017-06-11 | Resolved: 2021-07-13

## 2021-07-13 PROBLEM — R91.8 LUNG FIELD ABNORMAL: Status: RESOLVED | Noted: 2021-05-25 | Resolved: 2021-07-13

## 2021-07-13 LAB
ALBUMIN SERPL-MCNC: 2.2 G/DL (ref 3.4–5)
ALP SERPL-CCNC: 502 U/L (ref 40–150)
ALT SERPL W P-5'-P-CCNC: 285 U/L (ref 0–70)
ANION GAP SERPL CALCULATED.3IONS-SCNC: 4 MMOL/L (ref 3–14)
ANNOTATION COMMENT IMP: NOT DETECTED
APAP SERPL-MCNC: <2 MG/L (ref 10–30)
AST SERPL W P-5'-P-CCNC: 435 U/L (ref 0–45)
BILIRUB DIRECT SERPL-MCNC: <0.1 MG/DL (ref 0–0.2)
BILIRUB SERPL-MCNC: 0.9 MG/DL (ref 0.2–1.3)
BUN SERPL-MCNC: 49 MG/DL (ref 7–30)
CALCIUM SERPL-MCNC: 7.6 MG/DL (ref 8.5–10.1)
CHLORIDE BLD-SCNC: 105 MMOL/L (ref 94–109)
CO2 SERPL-SCNC: 25 MMOL/L (ref 20–32)
CREAT SERPL-MCNC: 2.1 MG/DL (ref 0.66–1.25)
CREAT SERPL-MCNC: 2.29 MG/DL (ref 0.66–1.25)
DEPRECATED M TB RPOB XXX QL NAA+PROBE: NORMAL
ERYTHROCYTE [DISTWIDTH] IN BLOOD BY AUTOMATED COUNT: 15.7 % (ref 10–15)
GFR SERPL CREATININE-BSD FRML MDRD: 29 ML/MIN/1.73M2
GFR SERPL CREATININE-BSD FRML MDRD: 32 ML/MIN/1.73M2
GLUCOSE BLD-MCNC: 206 MG/DL (ref 70–99)
HCT VFR BLD AUTO: 26.4 % (ref 40–53)
HGB BLD-MCNC: 8 G/DL (ref 13.3–17.7)
LDH SERPL L TO P-CCNC: 252 U/L (ref 85–227)
M TB DNA SPEC QL NAA+PROBE: NOT DETECTED
MAGNESIUM SERPL-MCNC: 2 MG/DL (ref 1.6–2.3)
MCH RBC QN AUTO: 28.5 PG (ref 26.5–33)
MCHC RBC AUTO-ENTMCNC: 30.3 G/DL (ref 31.5–36.5)
MCV RBC AUTO: 94 FL (ref 78–100)
PATH REPORT.COMMENTS IMP SPEC: NORMAL
PATH REPORT.FINAL DX SPEC: NORMAL
PATH REPORT.GROSS SPEC: NORMAL
PATH REPORT.RELEVANT HX SPEC: NORMAL
PLATELET # BLD AUTO: 313 10E3/UL (ref 150–450)
POTASSIUM BLD-SCNC: 4.8 MMOL/L (ref 3.4–5.3)
PROT SERPL-MCNC: 5.1 G/DL (ref 6.8–8.8)
RBC # BLD AUTO: 2.81 10E6/UL (ref 4.4–5.9)
SODIUM SERPL-SCNC: 134 MMOL/L (ref 133–144)
TACROLIMUS BLD-MCNC: 11.3 UG/L (ref 5–15)
TME LAST DOSE: NORMAL H
TME LAST DOSE: NORMAL H
WBC # BLD AUTO: 5.7 10E3/UL (ref 4–11)

## 2021-07-13 PROCEDURE — 97116 GAIT TRAINING THERAPY: CPT | Mod: GP

## 2021-07-13 PROCEDURE — 250N000013 HC RX MED GY IP 250 OP 250 PS 637

## 2021-07-13 PROCEDURE — 250N000013 HC RX MED GY IP 250 OP 250 PS 637: Performed by: PHYSICIAN ASSISTANT

## 2021-07-13 PROCEDURE — 87799 DETECT AGENT NOS DNA QUANT: CPT | Performed by: NURSE PRACTITIONER

## 2021-07-13 PROCEDURE — 80048 BASIC METABOLIC PNL TOTAL CA: CPT | Performed by: PHYSICIAN ASSISTANT

## 2021-07-13 PROCEDURE — 99232 SBSQ HOSP IP/OBS MODERATE 35: CPT | Mod: 24 | Performed by: NURSE PRACTITIONER

## 2021-07-13 PROCEDURE — 71045 X-RAY EXAM CHEST 1 VIEW: CPT | Mod: 26 | Performed by: RADIOLOGY

## 2021-07-13 PROCEDURE — 250N000012 HC RX MED GY IP 250 OP 636 PS 637: Performed by: PHYSICIAN ASSISTANT

## 2021-07-13 PROCEDURE — 82565 ASSAY OF CREATININE: CPT

## 2021-07-13 PROCEDURE — 250N000013 HC RX MED GY IP 250 OP 250 PS 637: Performed by: STUDENT IN AN ORGANIZED HEALTH CARE EDUCATION/TRAINING PROGRAM

## 2021-07-13 PROCEDURE — 80143 DRUG ASSAY ACETAMINOPHEN: CPT | Performed by: NURSE PRACTITIONER

## 2021-07-13 PROCEDURE — 82248 BILIRUBIN DIRECT: CPT | Performed by: PHYSICIAN ASSISTANT

## 2021-07-13 PROCEDURE — 80197 ASSAY OF TACROLIMUS: CPT | Performed by: PHYSICIAN ASSISTANT

## 2021-07-13 PROCEDURE — 71045 X-RAY EXAM CHEST 1 VIEW: CPT

## 2021-07-13 PROCEDURE — 97530 THERAPEUTIC ACTIVITIES: CPT | Mod: GP

## 2021-07-13 PROCEDURE — 88305 TISSUE EXAM BY PATHOLOGIST: CPT | Mod: 26 | Performed by: PATHOLOGY

## 2021-07-13 PROCEDURE — 82962 GLUCOSE BLOOD TEST: CPT | Performed by: INTERNAL MEDICINE

## 2021-07-13 PROCEDURE — 88112 CYTOPATH CELL ENHANCE TECH: CPT | Mod: 26 | Performed by: PATHOLOGY

## 2021-07-13 PROCEDURE — 83735 ASSAY OF MAGNESIUM: CPT | Performed by: PHYSICIAN ASSISTANT

## 2021-07-13 PROCEDURE — 71250 CT THORAX DX C-: CPT | Mod: 26 | Performed by: RADIOLOGY

## 2021-07-13 PROCEDURE — 83615 LACTATE (LD) (LDH) ENZYME: CPT | Performed by: NURSE PRACTITIONER

## 2021-07-13 PROCEDURE — 71250 CT THORAX DX C-: CPT

## 2021-07-13 PROCEDURE — 250N000009 HC RX 250: Performed by: INTERNAL MEDICINE

## 2021-07-13 PROCEDURE — 85027 COMPLETE CBC AUTOMATED: CPT | Performed by: PHYSICIAN ASSISTANT

## 2021-07-13 PROCEDURE — 36415 COLL VENOUS BLD VENIPUNCTURE: CPT | Performed by: NURSE PRACTITIONER

## 2021-07-13 PROCEDURE — 250N000012 HC RX MED GY IP 250 OP 636 PS 637

## 2021-07-13 PROCEDURE — 99221 1ST HOSP IP/OBS SF/LOW 40: CPT | Mod: 24 | Performed by: INTERNAL MEDICINE

## 2021-07-13 PROCEDURE — 214N000001 HC R&B CCU UMMC

## 2021-07-13 PROCEDURE — 250N000009 HC RX 250: Performed by: PHYSICIAN ASSISTANT

## 2021-07-13 PROCEDURE — 36415 COLL VENOUS BLD VENIPUNCTURE: CPT | Performed by: PHYSICIAN ASSISTANT

## 2021-07-13 RX ORDER — TACROLIMUS 1 MG/1
4 CAPSULE ORAL
Status: DISCONTINUED | OUTPATIENT
Start: 2021-07-14 | End: 2021-07-19 | Stop reason: HOSPADM

## 2021-07-13 RX ADMIN — GABAPENTIN 300 MG: 300 CAPSULE ORAL at 21:20

## 2021-07-13 RX ADMIN — TACROLIMUS 4 MG: 1 CAPSULE ORAL at 17:40

## 2021-07-13 RX ADMIN — GABAPENTIN 300 MG: 300 CAPSULE ORAL at 12:14

## 2021-07-13 RX ADMIN — Medication 10 MG: at 21:29

## 2021-07-13 RX ADMIN — ALLOPURINOL 200 MG: 100 TABLET ORAL at 08:54

## 2021-07-13 RX ADMIN — PREDNISONE 15 MG: 5 TABLET ORAL at 08:54

## 2021-07-13 RX ADMIN — MYCOPHENOLATE MOFETIL 1000 MG: 250 CAPSULE ORAL at 08:55

## 2021-07-13 RX ADMIN — BUPROPION HYDROCHLORIDE 75 MG: 75 TABLET, FILM COATED ORAL at 08:54

## 2021-07-13 RX ADMIN — AMITRIPTYLINE HYDROCHLORIDE 25 MG: 25 TABLET, FILM COATED ORAL at 21:20

## 2021-07-13 RX ADMIN — MYCOPHENOLATE MOFETIL 1000 MG: 250 CAPSULE ORAL at 21:19

## 2021-07-13 RX ADMIN — FLUTICASONE FUROATE AND VILANTEROL TRIFENATATE 1 PUFF: 100; 25 POWDER RESPIRATORY (INHALATION) at 08:55

## 2021-07-13 RX ADMIN — BUPROPION HYDROCHLORIDE 75 MG: 75 TABLET, FILM COATED ORAL at 21:20

## 2021-07-13 RX ADMIN — LEVALBUTEROL HYDROCHLORIDE 0.63 MG: 0.63 SOLUTION RESPIRATORY (INHALATION) at 16:17

## 2021-07-13 RX ADMIN — TACROLIMUS 4.5 MG: 1 CAPSULE ORAL at 08:55

## 2021-07-13 RX ADMIN — GABAPENTIN 300 MG: 300 CAPSULE ORAL at 08:54

## 2021-07-13 RX ADMIN — ANAKINRA 100 MG: 100 INJECTION, SOLUTION SUBCUTANEOUS at 08:56

## 2021-07-13 RX ADMIN — Medication 50 MG: at 21:29

## 2021-07-13 RX ADMIN — NYSTATIN 1000000 UNITS: 500000 SUSPENSION ORAL at 16:26

## 2021-07-13 RX ADMIN — MONTELUKAST 10 MG: 10 TABLET, FILM COATED ORAL at 21:19

## 2021-07-13 RX ADMIN — NYSTATIN 1000000 UNITS: 500000 SUSPENSION ORAL at 21:21

## 2021-07-13 RX ADMIN — ASPIRIN 81 MG CHEWABLE TABLET 81 MG: 81 TABLET CHEWABLE at 08:54

## 2021-07-13 RX ADMIN — PANTOPRAZOLE SODIUM 40 MG: 40 TABLET, DELAYED RELEASE ORAL at 08:54

## 2021-07-13 RX ADMIN — UMECLIDINIUM 1 PUFF: 62.5 AEROSOL, POWDER ORAL at 09:01

## 2021-07-13 RX ADMIN — PANTOPRAZOLE SODIUM 40 MG: 40 TABLET, DELAYED RELEASE ORAL at 21:20

## 2021-07-13 RX ADMIN — NYSTATIN 1000000 UNITS: 500000 SUSPENSION ORAL at 08:54

## 2021-07-13 RX ADMIN — NYSTATIN 1000000 UNITS: 500000 SUSPENSION ORAL at 12:14

## 2021-07-13 ASSESSMENT — ACTIVITIES OF DAILY LIVING (ADL)
ADLS_ACUITY_SCORE: 16
ADLS_ACUITY_SCORE: 16
ADLS_ACUITY_SCORE: 15
ADLS_ACUITY_SCORE: 15
ADLS_ACUITY_SCORE: 16
ADLS_ACUITY_SCORE: 16

## 2021-07-13 ASSESSMENT — MIFFLIN-ST. JEOR: SCORE: 1626.33

## 2021-07-13 NOTE — PROGRESS NOTES
[facility]   Cardiology II Service / Advanced Heart Failure  Daily Progress Note      Patient: Jim Willingham  MRN: [id number]  Admission Date: 7/8/2021  Hospital Day # 5    Assessment and Plan:    Jim Willingham is a 66 year old male with a history of NICM s/p OHT (5/6/21) postoperative course was complicated by right pleural air leak with prolonged chest tube, paroxysmal atrial fibrillation, COPD, CKDIII, DMII, anemia who presents to the hospital for generalized weakness, fatigue and multiple falls.    Today's Plan:  - hold Bumex today  - hepatology GI consult   - follow-up thoracic surgery recs for chest tube management  - continue to follow all pleural cultures (AFB, MCB, fungal, anarobic, aerobic)  - follow-up tacrolimus leve  - chest CT to follow-up pulmonary infection, will review with transplant ID  - continue calorie counts    # Status post OHT on 5/6/21, history of NICM   # c/b right pleural air leak w/prolonged CT, paroxysmal a fib  # Chronic immunosuppression  * TTE 6/29/21 with normal graft function, LVEF of 60-65%  * RHC 7/5/21: RA 18, PA 45/30/35, PCW 28, Kee CO/CI 5.4/2.7 at 195#    Graft Function:   --Volume: euvolemic by exam, Cr improved with holding Bumex despite elevated filling pressures last week  --BP: At goal  --HR: 90s-100s, at goal    Immunosuppression:   --Prednisone 15 mg daily, then per taper   --Cellcept 1000 mg BID (decreased for infection)  --Tacrolimus 4.5 mg in the AM and 4 mg in the PM (goal decreased to 8-10 d/t renal function and infection), pending today    Serostatus: CMV: D+/R+, EBV: D+/R+, Toxo D-/R-    Prophylaxis:   --CAV: aspirin 81 mg and HOLD rosuvastatin 10 mg daily for elevated LFTs  --Thrush: Nystatin   --PCP: HOLDING bactrim given renal dysfunction, s/p pentamidine 7/12, due q28 days  --GI: Protonix   --Osteoporosis: calcium/vitamin D   --CMV: D+/R+, Valcyte 450 mg renally dosed every other day x 3 mos     # Moderate pleural effusion  # Post op R  pleural air leak s/p prolonged chest tube  # Recent sepsis d/t CAP, s/p cefepime -> levaquin course ended 7/4  No pulmonary symptoms, denies cough or increased sputum production. Has remained afebrile, WBC SNL, and lack of other symptoms. However, given immunocompromised status, moderate pleural effusion is concerning. Diagnostic thoracetnesis on 7/10 with exudate, non-purulant. Gram stain negative. Triglycerides not suggestive of chylothroax.    Pending tests  - Aerobic pleural culture from 7/10, NGTD  - Fungal pleural culture from 7/10, NGTD  - Aerobic pleural culture from 7/11  - Anerobic pleural culture from 7/11  - Acid Fast and Mycobacterial stains and culture from 7/11    Other:  - Thoracics following for CT management  - Chest tube to suction  - Holding off on any antibiotics for now  - Daily CXR  - f/u chest CT 7/14-7/14 given recent pna to r/o fungal pna pending today     # Generalized weakness  # Mechanical falls  # Malnutrition   3 day history of generalized fatigue after discharge on 7/5/21 after treatment for CAP and sepsis. TSH WNL. With no other symptoms, weakness is likely 2/2 hypervolemia, deconditioning, possible malnutrition. Albumin 2.2.  - PT/OT following, dispo pending - ARU v TCU  - Nutrition following, on calorie counts     # Elevated liver enzymes  AST and ALT trending up this week; 435 and 285 respectively. Asymptomatic. No new medications except Anakinra. No symptoms.   - hepatology consult, defer imaging until recs  - holding statin and APAP  - will confirm donor hepatitis studies negative    # MCP pseudogout  Xray of L hand with chondrocalcinosis and capsular calcification at the second and third MCP joints. Synovial fluid analysis negative for gram stain (+ WBC, but no organisms, pseudogout crystals). Culture +staph epi, thought to be a contaminent given radha in broth only on second day of monitoring as well as pseudogout crystals also present in the fluid and improvement on anakinra.  "  - improving on anankira 100 mg every other day x 3 doses   - decreased allopurinol for renal function from 300 to 200 mg    # WALTER on CKD Stage III  Cr worsening this admission. Likely multifactorial fluid status and medication induced. Cr improving w holding Bumex.  - stopped bactrim  - renal dosing valcyte and allopurinol  - decreased tacrolimus goal     # DM II  - PTA insulin regimen      # Depression  - PTA Wellbutrin 75 BID  - PTA Amitriptyline 25 at bedtime     # COPD   - PTA Monetlukast, Trelegy Ellipta, Albuterol    Diet: regular diet  DVT Prophylaxis: ambulatory, start heparin IV when ok from chest tube standpoint  Russell Catheter: Not present  Code Status: Full    Kiesha Wilder, DNP, NP-C  Advanced Heart Failure/Cardiology 2 Service  Pager   7/13/2021  ================================================================    Subjective/24-Hr Events:   Last 24 hr care team notes reviewed. No changes today. No abdominal pains. Gout pain improving. Denies shortness of breath. Working with therapies.     ROS:  4 point ROS including respiratory, CV, GI and  (other than that noted in the HPI) is negative.     Medications: Reviewed in EPIC.     Physical Exam:   /80 (BP Location: Right arm)   Pulse 100   Temp 97.6  F (36.4  C) (Oral)   Resp 18   Ht 1.727 m (5' 8\")   Wt 86.8 kg (191 lb 6.4 oz)   SpO2 99%   BMI 29.10 kg/m      GENERAL: Appears comfortable, in no distress.  HEENT: Eye symmetrical, no discharge or icterus bilaterally. Mucous membranes moist and without lesions.  NECK: Supple, JVD difficult to appreciate.    CV: Tachycardic per baseline, +S1S2, no murmur, rub, or gallop.   RESPIRATORY: Respirations regular, even, and unlabored. Lungs CTA throughout, no crackles.    GI: Soft and non distended with normoactive bowel sounds present in all quadrants. No tenderness, rebound, guarding.   EXTREMITIES: No peripheral edema. 2+ bilateral pedal pulses. All extremities are warm and well " perfused  NEUROLOGIC: Alert and interacting appropriately. No focal deficits.   MUSCULOSKELETAL: No joint swelling or tenderness.   SKIN: No jaundice. No rashes or lesions.     Labs:  CMP  Recent Labs   Lab 07/13/21  0545 07/12/21  2119 07/12/21  1115 07/11/21  2240 07/11/21  1643 07/11/21  0710 07/10/21  1741 07/10/21  0624 07/09/21  0632 07/08/21  1533 07/08/21  1533    131* 134  --  131* 132* 132* 135 136  --  134   POTASSIUM 4.8 5.2 4.9  --  5.3 5.1 5.6* 4.6 4.8  --  4.6   CHLORIDE 105 102 104  --  102 104 103 105 108  --  104   CO2 25 21 21  --  21 23 24 23 23  --  22   ANIONGAP 4 8 9  --  8 6 5 6 5  --  7   * 316* 181* 264* 184* 125* 258* 77 122*  --  133*   BUN 49* 51* 49*  --  49* 49* 44* 46* 41*  --  37*   CR 2.10* 2.26* 2.44*  --  2.70* 2.75* 2.48* 2.40* 2.33*  --  2.37*   GFRESTIMATED 32* 30* 27*  --  24* 23* 26* 27* 28*  --  28*   GFRESTBLACK  --   --   --   --   --  27* 31* 32* 33*  --  32*   QAMAR 7.6* 8.0* 8.0*  --  7.9* 7.9* 8.1* 7.8* 7.5*  --  7.9*   MAG 2.0  --  1.9  --   --  1.8  --  1.8 1.6   < >  --    PROTTOTAL 5.1*  --  5.9*  --   --   --   --  5.3*  --   --  6.3*   ALBUMIN 2.2*  --  2.5*  --   --   --   --   --   --   --  2.6*   BILITOTAL 0.9  --  1.1  --   --   --   --   --   --   --  0.3   ALKPHOS 502*  --  324*  --   --   --   --   --   --   --  292*   *  --  100*  --   --   --   --   --   --   --  Canceled, Test credited   *  --  79*  --   --   --   --   --   --   --  55    < > = values in this interval not displayed.       CBC  Recent Labs   Lab 07/13/21  0545 07/12/21  2119 07/11/21  0710 07/10/21  0624   WBC 5.7 4.8 7.8 8.3   RBC 2.81* 3.39* 3.06* 2.80*   HGB 8.0* 9.5* 8.7* 8.0*   HCT 26.4* 31.6* 28.4* 26.0*   MCV 94 93 93 93   MCH 28.5 28.0 28.4 28.6   MCHC 30.3* 30.1* 30.6* 30.8*   RDW 15.7* 15.7* 15.6* 15.9*    330 358 314       INR  Recent Labs   Lab 07/10/21  0929   INR 1.06       Time/Communication  I personally spent a total of 30 minutes. Of that  >15 minutes was counseling/coordination of patient's care. Plan of care discussed with patient. See my note above for details.    Patient discussed with Dr. Montgomery.

## 2021-07-13 NOTE — PROGRESS NOTES
"D:Reports,,  \"My legs still feel weak,  But definitely better.   My left hand/thumb pain is better\".   Declined off for pain medication.   Pt indicated patient did well with ambulation.   Recommended patient push wheel chair to ambulate.    Denies shortness of breath.   Lungs are clear.  O2 sat 99% on room air.   No edema.   Rhythm is ST low 100's.   No ectopy..  Bp 99/63 MAP 75,  Temp 97.6 oral.    A:Minnimal pain.   Tolerating diet and activity well.   Patient indicated PT said, \"she does not think I will need to go to rehab from here\".   Rhythm is stable.   No fever.   Vital signs are stable.   Condition is improving   Pain in left had improving.    P;Ambulate qid.  Assess tolerance.   Assess level of comfort.   Monitor rhythm, temp and vital signs  "

## 2021-07-13 NOTE — PLAN OF CARE
D: admitted 7/9/21 for pleural effusion s/p chest tube placement    I: Monitored vitals and assessed pt status.   PRN: tramadol x1, tylenol, melatonin,     A: A0x4. VSS. Tachy with rates 100-120s at rest. Up to 150s with activities. On RA. Reports that breathing efforts is back to baseline after pentamidine neb irritation. Crackles in RLL. Tremors, numbness, tingling in hands at baseline. Pain in hands, treated with tramadol and tylenol. Poor appetite with dinner. Calorie count through 7/15. CT output of 10 mL this shift. UO of 350 mL.     P: Continue to monitor Pt status and report changes to Cards 2 team.

## 2021-07-13 NOTE — CONSULTS
[facility]    Hepatology Consult    Requesting provider: Dr. Seth    Consult requested for elevated LFTs/ALP      HPI:  66 year old male with Hx of NICM s/p orthotopic heart transplant (5/6/21) c/b Rt pleural leak, with prolonged Abx course, paroxysmal Afib, COPD, CKDIII, anemia, DMII, who was re-admitted to hospital with generalized weakness.    Pt stated that he had been feeling weak after discharge from hospital, no fever, chills, N/V/D/ abd pain. No itching or new rash. No Hx of liver related issues in past. He has been taking tylenol but no NSAIDS or herbal supplements.  Denies any worsening abdominal pain with oral intake.    No ETOH or smoking  Lives with his wife at home      Medical hx Surgical hx   Past Medical History:   Diagnosis Date     Bariatric surgery status 2003     Benign essential hypertension 05/11/2017     Bilateral carpal tunnel syndrome 11/02/2020     Cardiomyopathy, unspecified (H) 05/08/2017     CKD (chronic kidney disease) stage 3, GFR 30-59 ml/min 05/11/2017     COPD (chronic obstructive pulmonary disease) (H) 11/02/2020     Depression 05/11/2017     Diabetes mellitus (H) 1995     Gouty arthropathy, chronic, without tophi 11/02/2020     H/O gastric bypass 05/11/2017     ICD (implantable cardioverter-defibrillator), biventricular, in situ 05/11/2017     LVAD (left ventricular assist device) present (H)      Major depression, recurrent, chronic (H) 11/02/2020     NICM (nonischemic cardiomyopathy) (H)/ EF 20% 05/11/2017    ECHO: LVEDd. 7.66 cm, Restrictive pattern , Severe mitral valve regurgitation     CECILIA (obstructive sleep apnea) 05/11/2017     Paroxysmal atrial fibrillation (H) 05/11/2017     Paroxysmal VT (H) 05/11/2017     Rotator cuff tear arthropathy of both shoulders 11/02/2020     Uncomplicated asthma      Vitamin B12 deficiency (non anemic) 05/11/2017      Past Surgical History:   Procedure Laterality Date     ANESTHESIA CARDIOVERSION N/A 05/11/2020     Procedure: ANESTHESIA, FOR CARDIOVERSION @1100;  Surgeon: GENERIC ANESTHESIA PROVIDER;  Location: [facility] OR     CV HEART BIOPSY N/A 5/13/2021    Procedure: Heart Biopsy;  Surgeon: Scout Robins MD;  Location: [facility] HEART CARDIAC CATH LAB     CV HEART BIOPSY N/A 5/20/2021    Procedure: Heart Biopsy;  Surgeon: Jeffrey Gibson MD;  Location: [facility] HEART CARD[facility]AC CATH LAB     CV HEART BIOPSY N/A 5/27/2021    Procedure: Heart Biopsy;  Surgeon: Jac Dover MD;  Location: [facility] HEART CARD[facility]AC CATH LAB     CV HEART BIOPSY N/A 6/7/2021    Procedure: CV HEART BIOPSY;  Surgeon: Jeffrey Gibson MD;  Location: [facility] HEART CARDIAC CATH LAB     CV HEART BIOPSY N/A 6/21/2021    Procedure: CV HEART BIOPSY;  Surgeon: Scout Robins MD;  Location: [facility] HEART CARD[facility]AC CATH LAB     CV HEART BIOPSY N/A 7/5/2021    Procedure: CV HEART BIOPSY;  Surgeon: Jeffrey Gibson MD;  Location: [facility] HEART CARDIAC CATH LAB     CV RIGHT HEART CATH MEASUREMENTS RECORDED N/A 07/24/2019    Procedure: CV RIGHT HEART CATH;  Surgeon: Renu Sears MD;  Location:  HEART CARDIAC CATH LAB     CV RIGHT HEART CATH MEASUREMENTS RECORDED N/A 08/05/2020    Procedure: CV RIGHT HEART CATH;  Surgeon: Nicola Seth MD;  [facility]     CV RIGHT HEART CATH MEASUREMENTS RECORDED N/A 01/07/2021    Procedure: CV RIGHT HEART CATH;  Surgeon: Jac Dover MD;  Location:  HEART CARDIAC CATH LAB     CV RIGHT HEART CATH MEASUREMENTS RECORDED N/A 02/23/2021    Procedure: Heart Cath Right Heart Cath;  Surgeon: Jeffrey Gibson MD;  Location:  HEART CARDIAC CATH LAB     CV RIGHT HEART CATH MEASUREMENTS RECORDED N/A 03/23/2021    Procedure: Heart Cath Right Heart Cath. request for 3/23;  Surgeon: Jeffrey Gibson MD;  [facility]     CV RIGHT HEART CATH MEASUREMENTS RECORDED N/A 5/13/2021    Procedure: Right Heart Cath;  Surgeon: Julienne  MD Scout;  Location:  HEART CARDIAC CATH LAB     CV RIGHT HEART CATH MEASUREMENTS RECORDED N/A 5/20/2021    Procedure: Right Heart Cath;  Surgeon: Jeffrey Gibson MD;  Location:  HEART CARDIAC CATH LAB     CV RIGHT HEART CATH MEASUREMENTS RECORDED N/A 5/27/2021    Procedure: Right Heart Cath;  Surgeon: Jac Dover MD;  Location:  HEART CARDIAC CATH LAB     CV RIGHT HEART CATH MEASUREMENTS RECORDED N/A 6/7/2021    Procedure: CV RIGHT HEART CATH;  Surgeon: Jeffrey Gibson MD;  Location:  HEART CARDIAC CATH LAB     CV RIGHT HEART CATH MEASUREMENTS RECORDED N/A 6/21/2021    Procedure: CV RIGHT HEART CATH;  Surgeon: Scout Robins MD;  Location:  HEART CARDIAC CATH LAB     CV RIGHT HEART CATH MEASUREMENTS RECORDED N/A 7/5/2021    Procedure: CV RIGHT HEART CATH;  Surgeon: Jeffrey Gibson MD;  Location:  HEART CARDIAC CATH LAB     GI SURGERY  2003    [facility]     INSERT VENTRICULAR ASSIST DEVICE LEFT (HEARTMATE II) N/A 06/19/2017    Procedure: INSERT VENTRICULAR ASSIST DEVICE LEFT (HEARTMATE II);  Median Sternotomy Heartmate II Left Ventricular Assist Device Insertion on Pump Oxygenator;  Surgeon: Ronnie Quigley MD;  Location:  OR     ORTHOPEDIC SURGERY  1994    right knee wired     PICC DOUBLE LUMEN PLACEMENT Right 09/23/2020    5FR PICC DL. Length-43cm (1cm out).     PICC INSERTION - DOUBLE LUMEN Right 05/09/2021    IK/BRACH     RELEASE CARPAL TUNNEL BILATERAL Bilateral 02/18/2021    Procedure: Bilateral carpal tunnel release;  Surgeon: Jermaine Brand MD;  Location:  OR     TRANSPLANT HEART RECIPIENT N/A 05/05/2021    Procedure: Redo median sternotomy, lysis of adhesions, heart transplant recipient, on cardiopulmonary bypass, intraoperative transesophageal echocardiogram per anesthesia, Implantable Cardioverter Defibrillator (ICD) removal;  Surgeon: Ronnie Quigley MD;  Location:  OR          Medications  Current Facility-Administered  Medications   Medication Dose Route Frequency     albuterol  2.5 mg Nebulization Q28 Days    And     pentamidine  300 mg Inhalation Q28 Days     allopurinol  200 mg Oral Daily     amitriptyline  25 mg Oral At Bedtime     artificial tears  1 inch Both Eyes At Bedtime     aspirin  81 mg Oral Daily     buPROPion  75 mg Oral BID     fluticasone-vilanterol  1 puff Inhalation Daily     gabapentin  300 mg Oral QAM     gabapentin  300 mg Oral BID     insulin glargine  24 Units Subcutaneous At Bedtime     montelukast  10 mg Oral At Bedtime     mycophenolate  1,000 mg Oral BID     nystatin  1,000,000 Units Swish & Swallow 4x Daily     pantoprazole  40 mg Oral BID     polyethylene glycol  17 g Oral BID     predniSONE  15 mg Oral QAM     [Held by provider] rosuvastatin  10 mg Oral Daily     sodium chloride (PF)  3 mL Intracatheter Q8H     tacrolimus  4 mg Oral QPM     tacrolimus  4.5 mg Oral QAM     umeclidinium  1 puff Inhalation Daily     valGANciclovir  450 mg Oral Every Other Day       Allergies  Allergies   Allergen Reactions     Grass Shortness Of Breath     Ace Inhibitors Cough     Dust Mites Other (See Comments)     Asthma     Mold Other (See Comments)     Asthma     Penicillins Other (See Comments)     Unknown - childhood exposure    Tolerated Zosyn -2020     Sulfa Drugs Other (See Comments) and Unknown     Unknown childhood reaction       Family hx Social hx   Family History   Problem Relation Age of Onset     Cerebrovascular Disease Mother 64     Diabetes Mother      Hypertension Mother      Coronary Artery Disease Father      Diabetes Type 2  Father      Obesity Brother      Obesity Brother      Cerebrovascular Disease Daughter 40      Social History     Tobacco Use     Smoking status: Former Smoker     Quit date:      Years since quittin.5     Smokeless tobacco: Never Used   Substance Use Topics     Alcohol use: No     Drug use: No          Review of systems  A 10-point review of systems was  "negative.      Examination  /80 (BP Location: Right arm)   Pulse 100   Temp 97.6  F (36.4  C) (Oral)   Resp 18   Ht 1.727 m (5' 8\")   Wt 86.8 kg (191 lb 6.4 oz)   SpO2 99%   BMI 29.10 kg/m      Intake/Output Summary (Last 24 hours) at 7/13/2021 1020  Last data filed at 7/13/2021 0900  Gross per 24 hour   Intake 720 ml   Output 930 ml   Net -210 ml       Gen- well, NAD, A+Ox3, normal color  Eye- EOMI  ENT- MMM  CVS- S1, S2 heard  RS- CTA  Abd-NT/ND, no organomegaly, BS+  Extr- no ANETTE  Neuro- mild tremors      Laboratory  Lab Results   Component Value Date     07/13/2021     07/11/2021    POTASSIUM 4.8 07/13/2021    POTASSIUM 5.1 07/11/2021    CHLORIDE 105 07/13/2021    CHLORIDE 104 07/11/2021    CO2 25 07/13/2021    CO2 23 07/11/2021    BUN 49 07/13/2021    BUN 49 07/11/2021    CR 2.10 07/13/2021    CR 2.75 07/11/2021       Lab Results   Component Value Date    BILITOTAL 0.9 07/13/2021    BILITOTAL 0.3 07/08/2021     07/13/2021    ALT 55 07/08/2021     07/13/2021    AST Canceled, Test credited 07/08/2021    ALKPHOS 502 07/13/2021    ALKPHOS 292 07/08/2021       Lab Results   Component Value Date    ALBUMIN 2.2 07/13/2021    ALBUMIN 2.6 07/08/2021    PROTTOTAL 5.1 07/13/2021    PROTTOTAL 5.3 07/10/2021        Lab Results   Component Value Date    WBC 5.7 07/13/2021    WBC 7.8 07/11/2021    HGB 8.0 07/13/2021    HGB 8.7 07/11/2021    MCV 94 07/13/2021    MCV 93 07/11/2021     07/13/2021     07/11/2021       Lab Results   Component Value Date    INR 1.06 07/10/2021       Assessment  66 year old male with Hx of NICM s/p orthotopic heart transplant (5/6/21) c/b Rt pleural leak, with prolonged Abx course, paroxysmal Afib, COPD, CKDIII, anemia, DMII, who was re-admitted to hospital with generalized weakness.    Transaminases with elevated alk phos  Patient was noted to have mild elevation in his alk phos, which uptrend up to 160s-170s then downtrends back to normal even " before heart transplant.  Since his heart transplant his alk phos has been uptrending with normal liver enzymes.  His liver enzymes are noted to have elevation for the past 2 days with worsening since yesterday, AST more than ALT, T bili normal.  This pattern can be secondary to biliary sludge/obstruction causing elevation in his alk phos.  His last CT abdomen showed gallbladder sludge.  Less likely to be ischemic/congestive given stable T bili and recent OHT; and his alk phos has been elevated even prior to his transplant making DILI less concerning.  We can check GGT but given his elevated liver enzymes likelihood of it coming back elevated is high.  Right upper quadrant ultrasound can be done, if it shows dilated biliary tree, would recommend MRCP for further evaluation of biliary system.    Recommendations  --Recommend ultrasound right upper quadrant with Doppler  --Monitor liver enzymes and INR  --Avoid hepatotoxic medications      Thank you for involving us in this patient's care. Please do not hesitate to contact the GI service with any questions or concerns.     Pt care plan discussed with Dr. Leventhal, hepatology staff physician.    This note was created with voice recognition software, and while reviewed for accuracy, typos may remain.  Hailey Rust MD  Hepatology  #[id number]

## 2021-07-13 NOTE — PLAN OF CARE
"Neuro: A&Ox4. Tremors in hands  Cardiac: ST, HR 100s-120s. OVSS.   Respiratory: Sating 96% on RA.  GI/: Using bedside urinal. BM X1 along with one large unmeasured urine occurrence while stooling.   Diet/appetite: Reg diet, 2L FR. Calorie counts reinitiated today, ate most of a large breakfast and very little the rest of the day  Activity:  Assist of 1, up to chair and in halls with GB, walker .  Pain: Tylenol, ultram for L hand pain  Skin: No new deficits noted.  LDA's: R CT to water seal, R PIV SL     Plan: Continue with POC. Notify primary team with changes.    Shortly after pentamidine nebulizer, pt felt SOB, otherwise denied pain. VSS, -120s, BPs unchanged from baseline, RR 18-20, O2 96% on RA, although increased work of breathing and pursed lip breathing. MDs notified, assessed pt at bedside. Given albuterol neb, atarax and levalbuterol neb per order. States lungs feel unchanged, \"irritated\" but is able to take deeper breaths and subjectively has less shortness of breath and just feels fatigued. MDs updated. Levalbuterol avail PRN, see MAR.     "

## 2021-07-13 NOTE — PROGRESS NOTES
THORACIC & FOREGUT SURGERY    S:  No overnight events.  Pt seen at bedside resting comfortably.       O:  Vitals:    07/13/21 0430 07/13/21 0500 07/13/21 0700 07/13/21 1031   BP: 112/74  109/80    BP Location: Right arm  Right arm    Pulse: 99 98 100    Resp: 18  18    Temp:   97.6  F (36.4  C)    TempSrc:   Oral    SpO2: 98%  99%    Weight:    86.2 kg (190 lb)   Height:         AF/VSS  Resp non-labored  Distal extremities warm    CT well secured, serosang output, tidaling, no air leak    A&Ox3, NAD  Breathing non-labored  Soft, NDNT  Distal extremities warm    A/P: Patient is a 66 year old male with NICM s/p orthotopic heart transplant 5/6 c/b prolonged air leak, pAF, COPD, CKD, DMII with recent PNA who presented with malaise and fatigue found to have large right sided exudative pleural effusion s/p IR chest tube 7/11. Thoracic surgery is consulted for assistance with chest tube management. Large output overnight however minimal serosanguinous output today, low concern for infection given character and fluid analysis. Also no concern for chyle leak with TG 28 and fluid serosanguinous.    - CT scan today to eval residual fluid seen on CXR  - Remainder of plan dependant on CT result  - Thoracic to follow    Jeremias Dover PA-C  Thoracic & Foregut Surgery

## 2021-07-14 ENCOUNTER — APPOINTMENT (OUTPATIENT)
Dept: GENERAL RADIOLOGY | Facility: CLINIC | Age: 66
DRG: 186 | End: 2021-07-14
Payer: COMMERCIAL

## 2021-07-14 ENCOUNTER — APPOINTMENT (OUTPATIENT)
Dept: ULTRASOUND IMAGING | Facility: CLINIC | Age: 66
DRG: 186 | End: 2021-07-14
Attending: NURSE PRACTITIONER
Payer: COMMERCIAL

## 2021-07-14 LAB
ALBUMIN SERPL-MCNC: 2.2 G/DL (ref 3.4–5)
ALP SERPL-CCNC: 432 U/L (ref 40–150)
ALT SERPL W P-5'-P-CCNC: 184 U/L (ref 0–70)
ANION GAP SERPL CALCULATED.3IONS-SCNC: 5 MMOL/L (ref 3–14)
AST SERPL W P-5'-P-CCNC: 105 U/L (ref 0–45)
BACTERIA SPEC CULT: NO GROWTH
BACTERIA SPEC CULT: NO GROWTH
BILIRUB DIRECT SERPL-MCNC: <0.1 MG/DL (ref 0–0.2)
BILIRUB SERPL-MCNC: 0.2 MG/DL (ref 0.2–1.3)
BUN SERPL-MCNC: 45 MG/DL (ref 7–30)
CALCIUM SERPL-MCNC: 8.1 MG/DL (ref 8.5–10.1)
CHLORIDE BLD-SCNC: 105 MMOL/L (ref 94–109)
CMV DNA SPEC NAA+PROBE-ACNC: NOT DETECTED IU/ML
CO2 SERPL-SCNC: 25 MMOL/L (ref 20–32)
CREAT SERPL-MCNC: 2.02 MG/DL (ref 0.66–1.25)
EBV DNA COPIES/ML, INSTRUMENT: 1618 COPIES/ML
EBV DNA SPEC NAA+PROBE-LOG#: 3.2 {LOG_COPIES}/ML
ERYTHROCYTE [DISTWIDTH] IN BLOOD BY AUTOMATED COUNT: 15.8 % (ref 10–15)
GFR SERPL CREATININE-BSD FRML MDRD: 34 ML/MIN/1.73M2
GLUCOSE BLD-MCNC: 153 MG/DL (ref 70–99)
GLUCOSE BLDC GLUCOMTR-MCNC: 177 MG/DL (ref 70–99)
HCT VFR BLD AUTO: 27.2 % (ref 40–53)
HGB BLD-MCNC: 8.1 G/DL (ref 13.3–17.7)
INR PPP: 1.2 (ref 0.85–1.15)
MAGNESIUM SERPL-MCNC: 1.9 MG/DL (ref 1.6–2.3)
MCH RBC QN AUTO: 28 PG (ref 26.5–33)
MCHC RBC AUTO-ENTMCNC: 29.8 G/DL (ref 31.5–36.5)
MCV RBC AUTO: 94 FL (ref 78–100)
PLATELET # BLD AUTO: 339 10E3/UL (ref 150–450)
POTASSIUM BLD-SCNC: 4.7 MMOL/L (ref 3.4–5.3)
PROT SERPL-MCNC: 5 G/DL (ref 6.8–8.8)
RBC # BLD AUTO: 2.89 10E6/UL (ref 4.4–5.9)
SODIUM SERPL-SCNC: 135 MMOL/L (ref 133–144)
SPECIMEN SOURCE: NORMAL
SPECIMEN SOURCE: NORMAL
WBC # BLD AUTO: 6 10E3/UL (ref 4–11)

## 2021-07-14 PROCEDURE — 93975 VASCULAR STUDY: CPT | Mod: 26 | Performed by: RADIOLOGY

## 2021-07-14 PROCEDURE — 250N000013 HC RX MED GY IP 250 OP 250 PS 637: Performed by: INTERNAL MEDICINE

## 2021-07-14 PROCEDURE — 214N000001 HC R&B CCU UMMC

## 2021-07-14 PROCEDURE — 82248 BILIRUBIN DIRECT: CPT | Performed by: PHYSICIAN ASSISTANT

## 2021-07-14 PROCEDURE — 85610 PROTHROMBIN TIME: CPT | Performed by: NURSE PRACTITIONER

## 2021-07-14 PROCEDURE — 76700 US EXAM ABDOM COMPLETE: CPT

## 2021-07-14 PROCEDURE — 250N000012 HC RX MED GY IP 250 OP 636 PS 637: Performed by: PHYSICIAN ASSISTANT

## 2021-07-14 PROCEDURE — 99233 SBSQ HOSP IP/OBS HIGH 50: CPT | Mod: 24 | Performed by: PHYSICIAN ASSISTANT

## 2021-07-14 PROCEDURE — 250N000013 HC RX MED GY IP 250 OP 250 PS 637: Performed by: NURSE PRACTITIONER

## 2021-07-14 PROCEDURE — 250N000012 HC RX MED GY IP 250 OP 636 PS 637: Performed by: NURSE PRACTITIONER

## 2021-07-14 PROCEDURE — 87449 NOS EACH ORGANISM AG IA: CPT | Performed by: NURSE PRACTITIONER

## 2021-07-14 PROCEDURE — 999N000065 XR CHEST PORT 1 VIEW

## 2021-07-14 PROCEDURE — 85027 COMPLETE CBC AUTOMATED: CPT | Performed by: PHYSICIAN ASSISTANT

## 2021-07-14 PROCEDURE — 99232 SBSQ HOSP IP/OBS MODERATE 35: CPT | Mod: 24 | Performed by: INTERNAL MEDICINE

## 2021-07-14 PROCEDURE — 82962 GLUCOSE BLOOD TEST: CPT | Performed by: INTERNAL MEDICINE

## 2021-07-14 PROCEDURE — 36415 COLL VENOUS BLD VENIPUNCTURE: CPT | Performed by: NURSE PRACTITIONER

## 2021-07-14 PROCEDURE — 71045 X-RAY EXAM CHEST 1 VIEW: CPT

## 2021-07-14 PROCEDURE — 71045 X-RAY EXAM CHEST 1 VIEW: CPT | Mod: 26 | Performed by: STUDENT IN AN ORGANIZED HEALTH CARE EDUCATION/TRAINING PROGRAM

## 2021-07-14 PROCEDURE — 250N000013 HC RX MED GY IP 250 OP 250 PS 637: Performed by: STUDENT IN AN ORGANIZED HEALTH CARE EDUCATION/TRAINING PROGRAM

## 2021-07-14 PROCEDURE — 83735 ASSAY OF MAGNESIUM: CPT | Performed by: PHYSICIAN ASSISTANT

## 2021-07-14 PROCEDURE — 80053 COMPREHEN METABOLIC PANEL: CPT | Performed by: PHYSICIAN ASSISTANT

## 2021-07-14 PROCEDURE — 250N000013 HC RX MED GY IP 250 OP 250 PS 637

## 2021-07-14 PROCEDURE — 250N000012 HC RX MED GY IP 250 OP 636 PS 637

## 2021-07-14 PROCEDURE — 250N000013 HC RX MED GY IP 250 OP 250 PS 637: Performed by: PHYSICIAN ASSISTANT

## 2021-07-14 PROCEDURE — 87305 ASPERGILLUS AG IA: CPT | Performed by: NURSE PRACTITIONER

## 2021-07-14 PROCEDURE — 99232 SBSQ HOSP IP/OBS MODERATE 35: CPT | Mod: 24 | Performed by: NURSE PRACTITIONER

## 2021-07-14 PROCEDURE — 71045 X-RAY EXAM CHEST 1 VIEW: CPT | Mod: 26 | Performed by: RADIOLOGY

## 2021-07-14 RX ADMIN — UMECLIDINIUM 1 PUFF: 62.5 AEROSOL, POWDER ORAL at 09:24

## 2021-07-14 RX ADMIN — GABAPENTIN 300 MG: 300 CAPSULE ORAL at 11:57

## 2021-07-14 RX ADMIN — TACROLIMUS 4 MG: 1 CAPSULE ORAL at 17:40

## 2021-07-14 RX ADMIN — VALGANCICLOVIR 450 MG: 450 TABLET, FILM COATED ORAL at 09:12

## 2021-07-14 RX ADMIN — FLUTICASONE FUROATE AND VILANTEROL TRIFENATATE 1 PUFF: 100; 25 POWDER RESPIRATORY (INHALATION) at 09:24

## 2021-07-14 RX ADMIN — INSULIN GLARGINE 24 UNITS: 100 INJECTION, SOLUTION SUBCUTANEOUS at 21:36

## 2021-07-14 RX ADMIN — POLYETHYLENE GLYCOL 3350 17 G: 17 POWDER, FOR SOLUTION ORAL at 09:11

## 2021-07-14 RX ADMIN — NYSTATIN 1000000 UNITS: 500000 SUSPENSION ORAL at 09:11

## 2021-07-14 RX ADMIN — ALLOPURINOL 200 MG: 100 TABLET ORAL at 09:13

## 2021-07-14 RX ADMIN — AMITRIPTYLINE HYDROCHLORIDE 25 MG: 25 TABLET, FILM COATED ORAL at 21:32

## 2021-07-14 RX ADMIN — BUPROPION HYDROCHLORIDE 75 MG: 75 TABLET, FILM COATED ORAL at 20:03

## 2021-07-14 RX ADMIN — GABAPENTIN 300 MG: 300 CAPSULE ORAL at 20:03

## 2021-07-14 RX ADMIN — MONTELUKAST 10 MG: 10 TABLET, FILM COATED ORAL at 21:32

## 2021-07-14 RX ADMIN — POLYETHYLENE GLYCOL 3350 17 G: 17 POWDER, FOR SOLUTION ORAL at 21:30

## 2021-07-14 RX ADMIN — TACROLIMUS 4 MG: 1 CAPSULE ORAL at 09:12

## 2021-07-14 RX ADMIN — ASPIRIN 81 MG CHEWABLE TABLET 81 MG: 81 TABLET CHEWABLE at 09:13

## 2021-07-14 RX ADMIN — PANTOPRAZOLE SODIUM 40 MG: 40 TABLET, DELAYED RELEASE ORAL at 09:13

## 2021-07-14 RX ADMIN — CALCIUM CARBONATE 600 MG (1,500 MG)-VITAMIN D3 400 UNIT TABLET 1 TABLET: at 09:13

## 2021-07-14 RX ADMIN — CALCIUM CARBONATE 600 MG (1,500 MG)-VITAMIN D3 400 UNIT TABLET 1 TABLET: at 17:39

## 2021-07-14 RX ADMIN — NYSTATIN 1000000 UNITS: 500000 SUSPENSION ORAL at 11:57

## 2021-07-14 RX ADMIN — GABAPENTIN 300 MG: 300 CAPSULE ORAL at 09:13

## 2021-07-14 RX ADMIN — MYCOPHENOLATE MOFETIL 1000 MG: 250 CAPSULE ORAL at 20:02

## 2021-07-14 RX ADMIN — PREDNISONE 15 MG: 5 TABLET ORAL at 09:12

## 2021-07-14 RX ADMIN — PANTOPRAZOLE SODIUM 40 MG: 40 TABLET, DELAYED RELEASE ORAL at 20:03

## 2021-07-14 RX ADMIN — MYCOPHENOLATE MOFETIL 1000 MG: 250 CAPSULE ORAL at 09:12

## 2021-07-14 RX ADMIN — NYSTATIN 1000000 UNITS: 500000 SUSPENSION ORAL at 16:28

## 2021-07-14 RX ADMIN — BUPROPION HYDROCHLORIDE 75 MG: 75 TABLET, FILM COATED ORAL at 09:13

## 2021-07-14 RX ADMIN — Medication 50 MG: at 21:32

## 2021-07-14 RX ADMIN — Medication 10 MG: at 21:32

## 2021-07-14 RX ADMIN — NYSTATIN 1000000 UNITS: 500000 SUSPENSION ORAL at 20:03

## 2021-07-14 ASSESSMENT — ACTIVITIES OF DAILY LIVING (ADL)
ADLS_ACUITY_SCORE: 16
ADLS_ACUITY_SCORE: 15
ADLS_ACUITY_SCORE: 16
ADLS_ACUITY_SCORE: 16
ADLS_ACUITY_SCORE: 15
ADLS_ACUITY_SCORE: 15

## 2021-07-14 ASSESSMENT — MIFFLIN-ST. JEOR: SCORE: 1631.32

## 2021-07-14 NOTE — PLAN OF CARE
Pt admitted 7/8 with generalized weakness, fatigue , and multiple falls.  ST around 110-120's, VS'S on RA with left hand pain and medicated with prn Ultram.  Pt SOB earlier in shift and gave prn Lev albuterol.  EBV and CMV labs need to be collected, pt is on Sunshine cyte.  NPO at midnight for Abd US, hepatology consulted and recommend US.  Gave half dose of Lantus.  Zero out of CT and change dressing, site WNL.  Continue to monitor and with POC.

## 2021-07-14 NOTE — PLAN OF CARE
Pt A&Ox4, VSS, SBA.  CT pulled this shift.  Rhythm: SR/ST   No complaints of pain this shift.  Plan: Continue to work with therapies and discharge home vs. TCU.  Continue to monitor and contact Cards 2 with concerns.

## 2021-07-14 NOTE — PROGRESS NOTES
THORACIC & FOREGUT SURGERY    S:  No overnight events.  Pt seen at bedside resting comfortably.       O:  Vitals:    07/13/21 2324 07/14/21 0410 07/14/21 0614 07/14/21 0700   BP: 109/77 110/76  118/89   BP Location: Right arm Right arm  Right arm   Pulse: 105 98  99   Resp: 14 14 18   Temp: 98  F (36.7  C) 97.4  F (36.3  C)  98  F (36.7  C)   TempSrc: Oral Oral  Oral   SpO2: 98% 99%     Weight:   86.7 kg (191 lb 1.6 oz)    Height:         AF/VSS  Resp non-labored  Distal extremities warm    CT well secured, serosang output, tidaling, no air leak    A&Ox3, NAD  Breathing non-labored  Soft, NDNT  Distal extremities warm    A/P: Patient is a 66 year old male with NICM s/p orthotopic heart transplant 5/6 c/b prolonged air leak, pAF, COPD, CKD, DMII with recent PNA who presented with malaise and fatigue found to have large right sided exudative pleural effusion s/p IR chest tube 7/11. Thoracic surgery is consulted for assistance with chest tube management. Large output overnight however minimal serosanguinous output today, low concern for infection given character and fluid analysis. Also no concern for chyle leak with TG 28 and fluid serosanguinous.    - CT scan From yesterday showed minimal residual fluid.  - No indication for surgical intervention  - Recommend chest tube removal today  - Thoracic to sign off    Jeremias Dover PA-C  Thoracic & Foregut Surgery

## 2021-07-14 NOTE — PROGRESS NOTES
Dx: Admitted 7/8 for generalized weakness, fatigue, and multiple falls at home.   Hx: NICM s/p OHT (5/6/21) postop c/b right pleural air leak with prolonged chest tube, paroxysmal atrial fibrillation, COPD, CKDIII, DMII, and anemia.     VS: SR/ST with HR 90 - 110, , SaO2 98% on room air.   Neuro: A&O x4, denied pain, palpitations, dizziness, and nausea.  Respiratory: LS dim at bases with fine crackles in RLL.   Cardiac: S1/S2 heart sounds noted.   GI/: WDL. Denied nausea. Uses urinal.   Skin: R posterior Chest tube removed today. Dry gauze barrier applied. UTV drainage.   Pain: Denied.  Tests/procedures: US abdomen and Chest XR performed during shift, see Chart Review for results.    Lines: R PIV on hand.   Activity: Ambulate with 1 with walker and GB      Plan:  Continue with POC, continue to monitor liver enzymes, and notify team for changes.

## 2021-07-14 NOTE — PROGRESS NOTES
Transplant Social Work Services Progress Note      Date of Initial Social Work Evaluation: 7/8/2021  Collaborated with: Pt     Data: Pt admitted on 7/8/2021 for evaluation of weakness and falls at home. Pt is s/p heart transplant on 5/6/2021. Pt with recent admission for pneumonia 6/28-7/5/2021 and was discharged home with [facility].   Pt currently has chest tube. Pt working with therapies and now recommending home w/ home care. Discussed with pt and he feels he is getting stronger but does still feel weak. Pt here for at least a few more days and will continue to assess discharge needs. Pt is already open to [facility].     Intervention: Supportive Visit   Assessment: Pt is feeling stronger and more optimistic about his recovery post-transplant. Pt is hesitant with home recommendation, but still has a few more days here and will continue to work with therapies. Pt is motivated to work with therapies and work towards discharge home.   Education provided by SW: Ongoing Social Work support, discharge planning   Plan:    Discharge Plans in Progress: Anticipate pt will be able to discharge home w/ resumption of home therapies    Barriers to d/c plan: Medical Readiness     Follow up Plan: SW to continue to follow for support and discharge planning.

## 2021-07-14 NOTE — PROGRESS NOTES
Calorie Count  Intake recorded for: 7/13  Total Kcals: 0 Total Protein: 0g  Kcals from Hospital Food: 0   Protein: 0g  Kcals from Outside Food (average):0 Protein: 0g  # Meals Recorded: 1 meal ordered from kitchen, no intake recorded.   # Supplements Recorded: no intake recorded.

## 2021-07-14 NOTE — PLAN OF CARE
D: Admitted 7/8 for generalized weakness, fatigue and multiple falls.   Hx: NICM s/p OHT (5/6/21) postop c/b right pleural air leak with prolonged chest tube, paroxysmal atrial fibrillation, COPD, CKDIII, DMII, and anemia.      I: Monitored vitals and assessed pt status.   Changed: NPO since MN     A: A&Ox4. Hypoactive. BUE tremors present. VSS on RA. Tele shows SR/ST, rate 90-110s. Afebrile. Denied pain overnight. R pleural CT to -20 suction. Dressing CDI. ~10 mL output overnight. Oliguric. Poor appetite; on calorie counts though 7/15. Up with Ax1, GB, and walker. Appeared to rest comfortably between cares.      P: Abdominal Complete US with Doppler to evaluate for biliary disease or vascular injury today. Continue to monitor pt status and report changes to Cards 2.     [id number]  Rajani Holman RN on 7/14/2021 at 6:20 AM

## 2021-07-14 NOTE — PROGRESS NOTES
Brief Hepatology Note:  66 year old male with Hx of NICM s/p orthotopic heart transplant (5/6/21) c/b Rt pleural leak, with prolonged Abx course, paroxysmal Afib, COPD, CKDIII, anemia, DMII, who was re-admitted to hospital with generalized weakness.    Mild transaminitis noted upon labs, which have downtrended, likely due to passage of a small stone given his cholelithiasis or small ischemic event. His ALP has been elevated chronically and his US RUQ showing some steatosis, likely fatty liver disease with recent steroid use causing worsening in ALP. Consider weaning steroids early but would leave it up to primary team. Continue to monitor liver enzymes for improvement.    Case d/w Dr. Leventhal.    Hailey Rust  Hepatology Fellow

## 2021-07-14 NOTE — PROGRESS NOTES
[facility]   Cardiology II Service / Advanced Heart Failure  Daily Progress Note      Patient: Jim Willingham  MRN: [id number]  Admission Date: 7/8/2021  Hospital Day # 6    Assessment and Plan:    Jim Willingham is a 66 year old male with a history of NICM s/p OHT (5/6/21) postoperative course was complicated by right pleural air leak with prolonged chest tube, paroxysmal atrial fibrillation, COPD, CKDIII, DMII, anemia who presents to the hospital for generalized weakness, fatigue and multiple falls.    Today's Plan:  - chest tube out today  - repeat aspergillus and fungitell  - follow-up tacrolimus level tomorrow  - continue calorie counts, will discuss calorie counts    # Status post OHT on 5/6/21, history of NICM   # c/b right pleural air leak w/prolonged CT, paroxysmal a fib  # Chronic immunosuppression  * TTE 6/29/21 with normal graft function, LVEF of 60-65%  * RHC 7/5/21: RA 18, PA 45/30/35, PCW 28, Kee CO/CI 5.4/2.7 at 195#    Graft Function:   --Volume: euvolemic by exam, Cr improved with holding Bumex despite elevated filling pressures last week  --BP: At goal  --HR: 90s-100s, at goal    Immunosuppression:   --Prednisone 15 mg daily, then per taper   --Cellcept 1000 mg BID (decreased for infection)  --Tacrolimus 4 mg ibid(goal decreased to 8-10 d/t renal function and infection), rechck tomorrow    Serostatus: CMV: D+/R+, EBV: D+/R+, Toxo D-/R-    Prophylaxis:   --CAV: aspirin 81 mg and HOLD rosuvastatin 10 mg daily for elevated LFTs  --Thrush: Nystatin   --PCP: HOLDING bactrim given renal dysfunction, s/p pentamidine 7/12, due q28 days  --GI: Protonix   --Osteoporosis: calcium/vitamin D   --CMV: D+/R+, Valcyte 450 mg renally dosed every other day x 3 mos     # Moderate pleural effusion  # Post op R pleural air leak s/p prolonged chest tube  # Recent sepsis d/t CAP, s/p cefepime -> levaquin course ended 7/4  No pulmonary symptoms, denies cough or increased sputum production. Has remained  afebrile, WBC SNL, and lack of other symptoms. However, given immunocompromised status, moderate pleural effusion is concerning. Diagnostic thoracetnesis on 7/10 with exudate, non-purulant. Gram stain negative. Triglycerides not suggestive of chylothroax.    Pending tests  - Aerobic pleural culture from 7/10, NGTD  - Fungal pleural culture from 7/10, NGTD  - Aerobic pleural culture from 7/11  - Anerobic pleural culture from 7/11  - Acid Fast and Mycobacterial stains and culture from 7/11    Other:  - Thoracics following for CT management, removed today  - Holding off on antibiotics for now  - Daily CXR  - f/u chest CT 7/14-7/14 given recent pna to r/o fungal pna, still with cavitation  - transplant ID consulted, repeat fungitell and aspergillus     # Generalized weakness  # Mechanical falls  # Severe malnutrition in the setting of chronic illness   3 day history of generalized fatigue after discharge on 7/5/21 after treatment for CAP and sepsis. TSH WNL. With no other symptoms, weakness is likely 2/2 hypervolemia, deconditioning, possible malnutrition. Albumin 2.2.  - PT/OT following, cleared for home  - Nutrition following, on calorie counts     # Elevated liver enzymes  AST and ALT trending up this week; 435 and 285 respectively. Asymptomatic. No new medications except Anakinra. No symptoms. Improving without intervention.   - hepatology consulted   - abd US w hepatomegaly with steatosis  - holding statin and APAP    # MCP pseudogout  Xray of L hand with chondrocalcinosis and capsular calcification at the second and third MCP joints. Synovial fluid analysis negative for gram stain (+ WBC, but no organisms, pseudogout crystals). Culture +staph epi, thought to be a contaminent given radha in broth only on second day of monitoring as well as pseudogout crystals also present in the fluid and improvement on anakinra.   - improving on anankira 100 mg every other day x 3 doses   - decreased allopurinol for renal function  "from 300 to 200 mg    # WALTER on CKD Stage III  Cr worsening this admission. Likely multifactorial fluid status and medication induced. Cr improving w holding Bumex.  - stopped bactrim  - renal dosing valcyte and allopurinol  - decreased tacrolimus goal     # DM II  - PTA insulin regimen      # Depression  - PTA Wellbutrin 75 BID  - PTA Amitriptyline 25 at bedtime     # COPD   - PTA Monetlukast, Trelegy Ellipta, Albuterol    Diet: regular diet  DVT Prophylaxis: ambulatory, start heparin IV when ok from chest tube standpoint  Russell Catheter: Not present  Code Status: Full    Kiesha Wilder, DNP, NP-C  Advanced Heart Failure/Cardiology 2 Service  Pager   7/13/2021  ================================================================    Subjective/24-Hr Events:   Last 24 hr care team notes reviewed. No changes today. Feeling well. Appetite is ok. Afebrile, no abdominal pains.     ROS:  4 point ROS including respiratory, CV, GI and  (other than that noted in the HPI) is negative.     Medications: Reviewed in EPIC.     Physical Exam:   /89 (BP Location: Right arm)   Pulse 99   Temp 98  F (36.7  C) (Oral)   Resp 18   Ht 1.727 m (5' 8\")   Wt 86.7 kg (191 lb 1.6 oz)   SpO2 99%   BMI 29.06 kg/m      GENERAL: Appears comfortable, in no distress.  HEENT: Eye symmetrical, no discharge or icterus bilaterally. Mucous membranes moist and without lesions.  NECK: Supple, JVD difficult to appreciate.    CV: Tachycardic per baseline, +S1S2, no murmur, rub, or gallop.   RESPIRATORY: Respirations regular, even, and unlabored. Lungs CTA throughout, no crackles.    GI: Soft and non distended with normoactive bowel sounds present in all quadrants. No tenderness, rebound, guarding.   EXTREMITIES: No peripheral edema. 2+ bilateral pedal pulses. All extremities are warm and well perfused  NEUROLOGIC: Alert and interacting appropriately. No focal deficits.   MUSCULOSKELETAL: No joint swelling or tenderness.   SKIN: No " jaundice. No rashes or lesions.     Labs:  [facility]  Recent Labs   Lab 07/14/21  1222 07/14/21  0554 07/13/21 2118 07/13/21 2053 07/13/21  0545 07/12/21 2119 07/12/21  1115 07/11/21  1643 07/11/21  0710 07/10/21  2031 07/10/21  1741 07/10/21  0624 07/09/21  0632 07/08/21  1533 07/08/21  1533   NA  --  135  --   --  134 131* 134  --  132*   < > 132* 135 136  --  134   POTASSIUM  --  4.7  --   --  4.8 5.2 4.9  --  5.1   < > 5.6* 4.6 4.8  --  4.6   CHLORIDE  --  105  --   --  105 102 104  --  104   < > 103 105 108  --  104   CO2  --  25  --   --  25 21 21  --  23   < > 24 23 23  --  22   ANIONGAP  --  5  --   --  4 8 9  --  6   < > 5 6 5  --  7   * 153* 332*  --  206* 316* 181*   < > 125*   < > 258* 77 122*  --  133*   BUN  --  45*  --   --  49* 51* 49*  --  49*   < > 44* 46* 41*  --  37*   CR  --  2.02*  --  2.29* 2.10* 2.26* 2.44*  --  2.75*   < > 2.48* 2.40* 2.33*  --  2.37*   GFRESTIMATED  --  34*  --  29* 32* 30* 27*  --  23*   < > 26* 27* 28*  --  28*   GFRESTBLACK  --   --   --   --   --   --   --   --  27*  --  31* 32* 33*  --  32*   QAMAR  --  8.1*  --   --  7.6* 8.0* 8.0*  --  7.9*   < > 8.1* 7.8* 7.5*  --  7.9*   MAG  --  1.9  --   --  2.0  --  1.9  --  1.8  --   --  1.8 1.6   < >  --    PROTTOTAL  --  5.0*  --   --  5.1*  --  5.9*  --   --   --   --  5.3*  --   --  6.3*   ALBUMIN  --  2.2*  --   --  2.2*  --  2.5*  --   --   --   --   --   --   --  2.6*   BILITOTAL  --  0.2  --   --  0.9  --  1.1  --   --   --   --   --   --   --  0.3   ALKPHOS  --  432*  --   --  502*  --  324*  --   --   --   --   --   --   --  292*   AST  --  105*  --   --  435*  --  100*  --   --   --   --   --   --   --  Canceled, Test credited   ALT  --  184*  --   --  285*  --  79*  --   --   --   --   --   --   --  55    < > = values in this interval not displayed.       CBC  Recent Labs   Lab 07/14/21  0554 07/13/21  0545 07/12/21  2119 07/11/21  0710   WBC 6.0 5.7 4.8 7.8   RBC 2.89* 2.81* 3.39* 3.06*   HGB 8.1* 8.0* 9.5* 8.7*    HCT 27.2* 26.4* 31.6* 28.4*   MCV 94 94 93 93   MCH 28.0 28.5 28.0 28.4   MCHC 29.8* 30.3* 30.1* 30.6*   RDW 15.8* 15.7* 15.7* 15.6*    313 330 358       INR  Recent Labs   Lab 07/14/21  0554 07/10/21  0929   INR 1.20* 1.06       Time/Communication  I personally spent a total of 30 minutes. Of that >15 minutes was counseling/coordination of patient's care. Plan of care discussed with patient. See my note above for details.    Patient discussed with Dr. Montgomery.

## 2021-07-14 NOTE — PROGRESS NOTES
Transplant Infectious Disease Progress Note     Patient:  Jim Willingham   Date of birth 1955, Medical record number [id number]  Date of Visit:  07/14/2021  Date of Admission: 7/8/2021  Consult Requester:Nicola Seth MD          Assessment and Plan:   Recommendations:  1. Continue to monitor off of empiric abx  2. Please repeat BD glucan and Aspergillus galactomannan  3. Following previously collected pleural effusion cultures  4. Continue Valcyte and inhaled pentamidine ppx (last pentamidine 7/12/21)      Assessment/Discussion:  Jim Willingham is a 66 year old male with history significant for nonischemic cardiomyopathy now s/p heart transplant on 5/6/21 immunosuppressed on tacrolimus, mycophenolate, and prednisone) with course complicated by right pleural effusion and paroxysmal atrial fibrillation; additional history of COPD, type II DM, CKD stage III, chronic anemia, and gout. He was recently hospitalized at [facility] (6/28-7/5/21) with fever presumed due to right lower lobe pneumonia and right pleural exudative effusion s/p broad spectrum antibiotics and thoracentesis. Readmitted to [facility] on 7/8/21 with persistent fatigue, generalized weakness, left second finger MCP acute joint swelling, re-accumulation of right exudative pleural effesion.    #Right lower lobe cavitary lesion  #Left upper lobe 3mm nodules x2  Initially with RLL patchy opacity 6/28/21and exudative effusion. Was treated with cefepime (6/27-7/3), Azithromycin (6/29-7/1), then narrowed levofloxacin through 7/5/21. Initial work up included aspergillus galactomannan, blasto serum ag, fungal ab panel, histo urine ag, and BD glucan, all of which were unremarkable. CT repeated on 7/13, revealing RLL cavitary lesion and KALI nodules x3. Currently without fevers, leukocytosis, or respiratory symptoms. Previously had cough, which has resolved, and feels shortness of breath is improved. Unclear if seeing progression or resolution of this process. Fungal  infection such as aspergillus remains in ddx. Recommend repeating BD glucan and aspergillus galactomannan. Will consider next steps based on these results.    #Right pleural effusion, exudative  Cultures from 6/29 and 7/10 remain no growth to date. Both gram stains without organisms though noted WBCs. Was treated with empiric broad spectrum abx from 6/27-7/5. Now with re-accumulation s/p chest tube placement. No fevers, leukocytosis, or symptoms of pneumonia this admission. Remains exudative per Light's criteria. Feel that this is appears more consistent with non-infectious process rather than a bacterial empyema. Cultures repeated with chest tube placement on 7/11. Would not give another course of empiric abx at this time.     #Left second finger MCP joint pseudogout:  Pseudogout based on micro exam + for CPPD crystals. Late growth of S.epidermidis on synovial fluid culture, broth only at about 40 hours. Had been on Bactrim for PJP ppx, no fevers, leukocytosis, and procalcitonin negative. Now continues to improve. Most c/w contaminant.    Previous ID Issues:  - CoNS bacteremia pre-transplant (10/2020) while he had LVAD in place       Other ID issues:  - QTc interval:  472msec (7/12/21)  - Bacterial prophylaxis:  not indicated  - Pneumocystis prophylaxis:  inhaled pentamidine   - Viral serostatus: CMV +, EBV +, HSV-1+, VZV +, Toxo - (donor result not available)  - Viral prophylaxis:  Valcyte   - Fungal prophylaxis:  not indicated  - Immunization status:  Starting at three months post-transplant (early 8/21), should begin to receive pneumococcal, Shingrix, HBV, and Covid-19 vaccine series.  - Gamma globulin status:  838 pre-transplant (12/21/20)  - Isolation status: Good hand hygiene       Yadira Young PA-C  Infectious Disease  Pager # [phone]  07/14/2021         Interim History and Events:     Feeling well this morning. Has been working with therapies and feels that his limiting factor has been leg muscle fatigue.  "Does feel like he needs to catch his breath after exercise but does not feel that this has limited his activity level. Did have some coughing and bronchospasm after pentamidine two days ago but no issues since. No coughing, sputum production, orthopnea, or shortness of breath. Denies fever, rigors, sweats, chills, nausea, myalgias.         HPI:   Transplants:  5/6/2021 (Heart), Postoperative day:  69     Per Transplant ID Consult 7/11/21: \"Mr. Willingham is a 66 year old gentleman immunosuppressed (tacrolimus, mycophenolate, prednisone, now also anakinra) s/p a 5/6/21 heart transplant for nonischemic cardiomyopathy with a post-transplant course complicated by a right pleural air leak requiring a prolonged chest tube and paroxysmal atrial fibrillation.  He also has a history of COPD, type 2 diabetes mellitus, stage three chronic kidney disease, chronic anemia, and gout.  He was most recently previously hospitalized at [facility] from 6/28 - 7/5/21 with febrile sepsis due to right lower lobe healthcare-associated pneumonia, although no positive respiratory microbiology was apparently obtained (results are difficult to be certain about today after the [facility] laboratory system change last night).  One out of two blood cultures grew a presumably-contaminant Staph epidermidis.  In addition, he had an exudative right pleural effusion which was drained of 270 ml by thoracentesis on 6/29/21 with cultures negative.  The pneumonia and suspected empyema was treated empirically with cefepime (6/28 - 7/3/21), levofloxacin (7/3 - 7/4/21 to complete a five day course), azithromycin (6/29 - 7/1/21), and vancomycin (6/29 - 7/1/21, for the Staph epidermidis blood culture).  He defervesced quickly and has been afebrile since 6/30/21.     He was now re-admitted three days later to the [facility] service on 7/8/21 PM with persistent ambulatory generalized weakness and fatigue that had not improved since his 7/5/21 discharge.  He fell about " three times during those three days and complained of intermittent orthostatic dizziness as well as a weak appetite and inanition.  In addition, two days prior to admission, he began to develop left second finger MCP swelling that worsened on the day of re-admission with erythema and marked pain to movement and palpation, similar to his past gout flares in that joint.  He lacks other arthralgias or swelling of other joints.  Upon admission, he lacked fever, chills, EENT symptoms, resting dyspnea, cough, chest pain, nausea / emesis, abdominal pain, diarrhea, or additional complaints upon admission.  Since then, he has remained afebrile (T max 98.7 degrees F) and has had a steadily normal peripheral WBC in the 5.6 - 8.3 range, with ahigh CRP (87 - 98) and ESR (72 on 7/8/21) but a negative admission procalcitonin (0.18 on 7/8/21).  So far, the only antimicrobial he has received during this hospitalization is prophylactic TMP-SMX.  A left hand x-ray on 7/8/21 showed chondrocalcinosis and capsular calcification of two left (second and third) MCP joints.  Arthrocentesis was performed on the left second MCP joint by Orthopedics Surgery Consult on 7/8/21 with synovial fluid yielding CPPD crystals and a negative Gram stain (for organisms) with many PMNs, consistent with pseudogout. Ankirna was started on 7/9/21 and the joint is somewhat improved but still now swollen and painful.  Yesterday, 7/10/21, the 7/8/21 left second MCP synovial aspirated fluid culture belatedly isolated a Staphylococcus epidermidis (susceptibilities pending) at about 42 hours of incubation.  Transplant ID was consulted regarding whether this needs to be treated.     In addition, an admission chest x-ray on 7/8/21 and a repeat chest x-ray on 7/10/21 showed re-accumulated right pleural effusion (after it had previously been drained on 6/29/21.  A diagnostic thoracentesis performed on 7/10/21 once again yielded an exudate (, pleural total protein  "2.7, glucose 102) with 384 WBC (52% neutrophils, 28% L, 20% M).  The pleural Gram stain showed no organisms.  Cultures are reportedly no growth to date.  A repeat therapeutic right thoracentesis with placement of a drainage tube is planned for today and additional cultures will be sent.  Beyond these two issues, Mr. Willingham is overall slowly improving and says his strength and energy are slightly better than upon admission.  He is eating partial meals.  His pain control is adequate. He has received diuresis and his breathing is stable on room air.  He lacks additional new complaints over the past couple of days.\"       ROS:  10 point review of systems was completed, pertinent positives and negatives are outlined above             Physical Examination:  Temp: 97.6  F (36.4  C) Temp src: Axillary BP: 96/73 Pulse: 109   Resp: 16 SpO2: 97 % O2 Device: None (Room air)      Vitals:    07/09/21 0638 07/10/21 0700 07/12/21 0135 07/13/21 1031   Weight: 86 kg (189 lb 8 oz) 87.5 kg (192 lb 14.4 oz) 86.8 kg (191 lb 6.4 oz) 86.2 kg (190 lb)    07/14/21 0614   Weight: 86.7 kg (191 lb 1.6 oz)       Constitutional: Awake, alert, and oriented. Pleasant and cooperative with exam.  HEENT: NC/AT, EOMI, anicteric sclera, conjunctiva non-injected, OP with MMM  Respiratory: No increased work of breathing, CTAB, no crackles or wheezing.  Cardiovascular: RRR, no murmur noted. No peripheral edema.  GI: Normal bowel sounds, soft, non-distended and non-tender.  Skin: Warm, dry, well-perfused. No bruising, bleeding, rashes, or lesions on limited exam.  Extremities: Warm, no mottling or edema. Left 2nd digit MCP joint swollen but improving per patient report, able to make a fist and fully extend finger.  Neurologic: A&O. Answers questions appropriately, speech normal. Moves all extremities spontaneously.  Vascular access:  PIV on RUE CDI, non-tender, no surrounding erythema.    Medications:    allopurinol  200 mg Oral Daily     amitriptyline  25 " mg Oral At Bedtime     artificial tears  1 inch Both Eyes At Bedtime     aspirin  81 mg Oral Daily     buPROPion  75 mg Oral BID     calcium carbonate 600 mg-vitamin D 400 units  1 tablet Oral BID w/meals     fluticasone-vilanterol  1 puff Inhalation Daily     gabapentin  300 mg Oral QAM     gabapentin  300 mg Oral BID     insulin glargine  24 Units Subcutaneous At Bedtime     montelukast  10 mg Oral At Bedtime     mycophenolate  1,000 mg Oral BID     nystatin  1,000,000 Units Swish & Swallow 4x Daily     pantoprazole  40 mg Oral BID     polyethylene glycol  17 g Oral BID     predniSONE  15 mg Oral QAM     [Held by provider] rosuvastatin  10 mg Oral Daily     sodium chloride (PF)  3 mL Intracatheter Q8H     tacrolimus  4 mg Oral QAM     tacrolimus  4 mg Oral QPM     umeclidinium  1 puff Inhalation Daily     valGANciclovir  450 mg Oral Every Other Day       Infusions/Drips:    - MEDICATION INSTRUCTIONS -         Laboratory Data:   No results found for: ACD4    Inflammatory Markers    Recent Labs   Lab Test 07/09/21  0632 07/08/21  1746 07/08/21  1533 06/28/21  2208 05/27/21  0449 10/27/20  1250 10/20/20  1305 10/13/20  1320   SED  --  72*  --   --   --  10 8 10   CRP 98.0*  --  87.0* 27.0* 140.0* 3.5 11.0* 12.0*       Metabolic Studies       Recent Labs   Lab Test 07/14/21  1222 07/14/21  0554 07/13/21  2118 07/13/21  2053 07/13/21  0545 07/12/21  2119 07/12/21  1115 07/11/21  2240 07/11/21  1643 07/11/21  0710 07/01/21  0644 06/30/21  0339 06/25/21  1133 06/24/21  0905   NA  --  135  --   --  134 131* 134  --  131* 132*  --  130*  --  134   POTASSIUM  --  4.7  --   --  4.8 5.2 4.9  --  5.3 5.1  --  4.8  --  4.3   CHLORIDE  --  105  --   --  105 102 104  --  102 104  --  101  --  102   CO2  --  25  --   --  25 21 21  --  21 23  --  25  --  23   ANIONGAP  --  5  --   --  4 8 9  --  8 6  --  4  --  9   BUN  --  45*  --   --  49* 51* 49*  --  49* 49*  --  47*  --  41*   CR  --  2.02*  --  2.29* 2.10* 2.26* 2.44*  --   2.70* 2.75*   < > 2.24*  --  2.23*   GFRESTIMATED  --  34*  --  29* 32* 30* 27*  --  24* 23*   < > 30*  --  30*   * 153* 332*  --  206* 316* 181*  --  184* 125*   < > 157*  --  320*   QAMAR  --  8.1*  --   --  7.6* 8.0* 8.0*  --  7.9* 7.9*  --  8.3*  --  8.0*   PHOS  --   --   --   --   --   --   --   --   --   --   --  2.8  --  3.5   MAG  --  1.9  --   --  2.0  --  1.9   < >  --  1.8   < > 2.2   < > 1.8    < > = values in this interval not displayed.     Recent Labs   Lab Test 07/08/21  1533 06/30/21  0339 06/28/21 2208 05/26/21  2324   LACT 1.0 0.7 1.4 1.6     Recent Labs   Lab Test 06/21/21  0933 06/07/21  0807   CKT 31 49     Recent Labs   Lab Test 05/04/21  2313 01/07/21  0719 11/05/20  0907   A1C 5.1 6.2* 5.7*       Hepatic Studies    Recent Labs   Lab Test 07/14/21  0554 07/13/21 2053 07/13/21  0545 07/12/21  1115 07/08/21  1533   BILITOTAL 0.2  --  0.9 1.1 0.3   ALKPHOS 432*  --  502* 324* 292*   ALBUMIN 2.2*  --  2.2* 2.5* 2.6*   *  --  435* 100* Canceled, Test credited   *  --  285* 79* 55   LDH  --  252*  --   --   --        Pancreatitis testing    Recent Labs   Lab Test 05/26/21  1340 05/04/21  2313 11/05/20  0907 08/05/20  0335 07/06/18  0856 06/08/17  0944   AMYLASE 49 93  --   --   --   --    LIPASE 25*  --   --  168  --   --    TRIG  --   --  91  --  123 124       Hematology Studies      Recent Labs   Lab Test 07/14/21  0554 07/13/21  0545 07/12/21  2119 07/11/21  0710 07/10/21  0624   WBC 6.0 5.7 4.8 7.8 8.3   HGB 8.1* 8.0* 9.5* 8.7* 8.0*   HCT 27.2* 26.4* 31.6* 28.4* 26.0*    313 330 358 314     Recent Labs   Lab Test 07/08/21  1746 07/08/21  1533 07/07/21  0858 07/05/21  0642 07/04/21  0721   ANEU 7.7 Canceled, Test credited 3.5 2.6 2.6   ALYM 0.3* Canceled, Test credited 1.3 0.8 0.4*   VIJAY 0.1 Canceled, Test credited 0.5 0.5 0.4   AEOS 0.0 Canceled, Test credited 0.1 0.0 0.0       Arterial Blood Gas Testing    Recent Labs   Lab Test 06/28/21  2247 05/09/21  0956  05/09/21  0902 05/07/21  1900 05/07/21  1700 05/07/21  1420   PH  --   --   --  7.40 7.38 7.39   PCO2  --   --   --  38 41 40   PO2  --   --   --  101 102 130*   HCO3  --   --   --  23 24 24   O2PER 21.0 21 21 REPORT AMENDED: 2LNC Canceled, Test credited  2L 40        Urine Studies     Recent Labs   Lab Test 07/09/21  0640 06/30/21  0640 05/25/21  1230   URINEPH 5.5 5.0 5.5   NITRITE Negative Negative Negative   LEUKEST Negative Negative Negative   WBCU 3 4 4       Vancomycin Levels     Recent Labs   Lab Test 05/08/21 2004 05/07/21  1701 10/27/20  1250 10/20/20  1305   VANCOMYCIN 18.5 16.0 19.3 16.5       Tobramycin levels     No lab results found.    Gentamicin levels    No lab results found.    CSF testing   No lab results found.      Microbiology:  Culture Micro   Date Value Ref Range Status   07/09/2021 No growth  Final   07/08/2021 (A)  Preliminary    On day 2, isolated in broth only:  Staphylococcus epidermidis     07/08/2021   Preliminary    Critical Value/Significant Value, preliminary result only, called to and read back by  Neli Boswell RN 1527 7/10/21 AM      07/08/2021 Culture negative monitoring continues  Preliminary   07/08/2021 No growth  Final   07/08/2021 No growth  Final   06/30/2021 No growth  Final   06/30/2021 No growth  Final   06/29/2021 No growth  Final   06/29/2021 No anaerobes isolated  Final   06/29/2021 Culture negative after 2 weeks  Preliminary   06/29/2021   Preliminary    Culture received and in progress.  Positive AFB results are called as soon as detected.    Final report to follow in 7 to 8 weeks.     06/29/2021   Preliminary    Assayed at [facility] [address] [place] [id number] [phone]   06/29/2021 No growth after 14 days  Preliminary   06/28/2021 No growth  Final   06/28/2021 (A)  Final    Cultured on the 2nd day of incubation:  Staphylococcus epidermidis     06/28/2021   Final    Critical Value/Significant Value, preliminary result only, called to and  read back by  Lisa Rush RN 6/30/21 @ 0427 TF     06/28/2021   Final    (Note)  POSITIVE for STAPHYLOCOCCUS EPIDERMIDIS and POSITIVE for the mecA  gene (resistant to methicillin) by [employer] multiplex nucleic acid  test. Final identification and antimicrobial susceptibility testing  will be verified by standard methods.    Specimen tested with Verigene multiplex, gram-positive blood culture  nucleic acid test for the following targets: Staph aureus, Staph  epidermidis, Staph lugdunensis, other Staph species, Enterococcus  faecalis, Enterococcus faecium, Streptococcus species, S. agalactiae,  S. anginosus grp., S. pneumoniae, S. pyogenes, Listeria sp., mecA  (methicillin resistance) and Lucía/B (vancomycin resistance).    Critical Value/Significant Value called to and read back by Darcie Saeed RN at 0701 6.30.21. amd     05/26/2021 No growth  Final   05/26/2021 No growth  Final   05/26/2021 No acid fast bacilli isolated after 6 weeks  Final   05/25/2021 Heavy growth  Normal jaxon    Final   05/17/2021 No growth  Final   05/17/2021 No growth  Final   05/15/2021 No growth  Final   11/04/2020 No growth  Final   09/23/2020 No growth  Final   09/22/2020 No growth  Final   09/21/2020 No growth  Final   09/20/2020 No growth  Final   09/19/2020 No growth  Final   09/18/2020 (A)  Final    Cultured on the 2nd day of incubation:  Staphylococcus hominis  Susceptibility testing done on previous specimen     09/18/2020   Final    Critical Value/Significant Value, preliminary result only, called to and read back by  Tamy Leventhal RN 1724 9/20/20 AM     09/18/2020 No growth  Final   09/17/2020 (A)  Final    Cultured on the 1st day of incubation:  Staphylococcus hominis     09/17/2020   Final    Critical Value/Significant Value, preliminary result only, called to and read back by  John Howard RN @1414 09/18/2020 hdh/lm     09/17/2020   Final    (Note)  POSITIVE for Staphylococci other than S.aureus, S.epidermidis  and  S.lugdunensis, by [email or web address] multiplex nucleic acid test.  Coagulase-negative staphylococci are the most common venipuncture or  collection associated skin CONTAMINANTS grown in blood cultures.  Final identification and antimicrobial susceptibility testing will be  verified by standard methods.    Specimen tested with Verigene multiplex, gram-positive blood culture  nucleic acid test for the following targets: Staph aureus, Staph  epidermidis, Staph lugdunensis, other Staph species, Enterococcus  faecalis, Enterococcus faecium, Streptococcus species, S. agalactiae,  S. anginosus grp., S. pneumoniae, S. pyogenes, Listeria sp., mecA  (methicillin resistance) and Lucía/B (vancomycin resistance).    Critical Value/Significant Value called to and read back by Le Carmona RN. @1712. 9.18.20. BS.      09/17/2020 No growth  Final   01/21/2020 No growth  Final   01/21/2020 (A)  Final    Heavy growth  Haemophilus influenzae  Beta lactamase negative  Beta-lactamase negative Haemophilus influenzae are usually susceptible to ampicillin,   amoxacillin/clavulanic acid, levofloxacin, and 3rd generation cephalosporins, such as   ceftriaxone.     01/20/2020 No growth  Final   01/20/2020 No growth  Final   01/16/2020 (A)  Final    10,000 to 50,000 colonies/mL  Coagulase negative Staphylococcus     01/15/2020 No growth  Final   01/15/2020 (A)  Final    Cultured on the 1st day of incubation:  Staphylococcus epidermidis     01/15/2020   Final    Critical Value/Significant Value, preliminary result only, called to and read back by  Alphonse Cuba RN @ 1920. 1/16/20. AV     01/15/2020   Final    (Note)  POSITIVE for STAPHYLOCOCCUS EPIDERMIDIS and NEGATIVE for the mecA  gene (not resistant to methicillin) by Verigene nucleic acid test.  The mecA gene was not detected. Final identification and  antimicrobial susceptibility testing will be verified by standard  methods.    Specimen tested with Verigene multiplex, gram-positive  blood culture  nucleic acid test for the following targets: Staph aureus, Staph  epidermidis, Staph lugdunensis, other Staph species, Enterococcus  faecalis, Enterococcus faecium, Streptococcus species, S. agalactiae,  S. anginosus grp., S. pneumoniae, S. pyogenes, Listeria sp., mecA  (methicillin resistance) and Lucía/B (vancomycin resistance).    Critical Value/Significant Value called to and read back by Dr. Leos, @2214 01/16/20..     04/13/2018 No growth  Final   04/13/2018 No growth  Final   05/24/2017 No growth  Final   05/23/2017 Moderate growth Normal jaxon  Final       Last check of C difficile  No results found for: CDBPCT    Imaging:  CT Chest w/o Contrast (7/13/21)  IMPRESSION:   1. Right lower lobe pneumonia as well as cavitation of prior right  lower lobe medial consolidation. Likely atypical organisms. Interval  increased density of a 3 mm nodule in the left upper lobe since  6/29/2021 suggestive for additional nidus of infection. Recommend  continued short-term interval imaging follow-up.  2. Small right hydropneumothorax with stable positioning of posterior  right basilar chest tube. Fluid component is significantly decreased  when compared to prior exam 6/29/2021.  3. Significant debris within the esophagus which may be seen in the  setting of esophageal dysmotility. Follow-up esophagram may be  considered for further evaluation.  4. Mediastinal adenopathy, likely reactive.  5. Heart transplant changes, with fluid in the anterior mediastinum  tracking inferiorly subxiphoid. A contrast enhanced CT could be  helpful if concern for abscess, or FNA could be considered.    CXR (7/13/21)  Impression:   1. Small bilateral pleural effusions with bibasilar streakiness,  likely atelectasis.  2. Linear opacity overlying the right midlung, likely atelectasis.    CT Chest/Abd/Pelvis w/o contrast (6/28/21)                                                                   IMPRESSION:   1. A small patchy  opacity in the central aspect of the right lower lobe, new since 05/26/2021. This most likely represents pneumonia. Several additional patchy opacities that had been present within the lungs on the relatively recent comparison study   have resolved.  2. Moderate-sized right pleural effusion, increased in size since comparison study. A very small left pleural effusion that had been present on the comparison study has resolved.  3. No acute abnormality identified in the abdomen or pelvis.     ECHO:  7/12/21 Limited  Interpretation Summary  Limited Echo to assess for pericardial effusion.  Global and regional left ventricular function is normal with an EF of 60-65%.  Right ventricular function, chamber size, wall motion, and thickness are  normal.  Small loculated pericardial effusion with organized material anterior to right  ventricle measuring 1.4 cm in maximal diameter. No evidence of tamponade  physiology.  The inferior vena cava is normal.     This study was compared with the study from 6/29/2021. Small loculated  pericardial effusion is now present.

## 2021-07-15 ENCOUNTER — APPOINTMENT (OUTPATIENT)
Dept: OCCUPATIONAL THERAPY | Facility: CLINIC | Age: 66
DRG: 186 | End: 2021-07-15
Payer: COMMERCIAL

## 2021-07-15 ENCOUNTER — APPOINTMENT (OUTPATIENT)
Dept: GENERAL RADIOLOGY | Facility: CLINIC | Age: 66
DRG: 186 | End: 2021-07-15
Payer: COMMERCIAL

## 2021-07-15 ENCOUNTER — TELEPHONE (OUTPATIENT)
Dept: FAMILY MEDICINE | Facility: CLINIC | Age: 66
End: 2021-07-15

## 2021-07-15 ENCOUNTER — PREP FOR PROCEDURE (OUTPATIENT)
Dept: CARDIOLOGY | Facility: CLINIC | Age: 66
End: 2021-07-15

## 2021-07-15 ENCOUNTER — APPOINTMENT (OUTPATIENT)
Dept: PHYSICAL THERAPY | Facility: CLINIC | Age: 66
DRG: 186 | End: 2021-07-15
Payer: COMMERCIAL

## 2021-07-15 DIAGNOSIS — I31.39 PERICARDIAL EFFUSION: Primary | ICD-10-CM

## 2021-07-15 LAB
ALBUMIN SERPL-MCNC: 2.3 G/DL (ref 3.4–5)
ALP SERPL-CCNC: 458 U/L (ref 40–150)
ALT SERPL W P-5'-P-CCNC: 173 U/L (ref 0–70)
ANION GAP SERPL CALCULATED.3IONS-SCNC: 6 MMOL/L (ref 3–14)
AST SERPL W P-5'-P-CCNC: 112 U/L (ref 0–45)
ATRIAL RATE - MUSE: 106 BPM
BILIRUB DIRECT SERPL-MCNC: <0.1 MG/DL (ref 0–0.2)
BILIRUB SERPL-MCNC: 0.3 MG/DL (ref 0.2–1.3)
BUN SERPL-MCNC: 41 MG/DL (ref 7–30)
CALCIUM SERPL-MCNC: 7.8 MG/DL (ref 8.5–10.1)
CHLORIDE BLD-SCNC: 108 MMOL/L (ref 94–109)
CO2 SERPL-SCNC: 23 MMOL/L (ref 20–32)
CREAT SERPL-MCNC: 1.73 MG/DL (ref 0.66–1.25)
DIASTOLIC BLOOD PRESSURE - MUSE: NORMAL MMHG
ERYTHROCYTE [DISTWIDTH] IN BLOOD BY AUTOMATED COUNT: 15.9 % (ref 10–15)
GFR SERPL CREATININE-BSD FRML MDRD: 41 ML/MIN/1.73M2
GLUCOSE BLD-MCNC: 170 MG/DL (ref 70–99)
GLUCOSE BLDC GLUCOMTR-MCNC: 213 MG/DL (ref 70–99)
HCT VFR BLD AUTO: 26.6 % (ref 40–53)
HGB BLD-MCNC: 8.1 G/DL (ref 13.3–17.7)
INR PPP: 1.09 (ref 0.85–1.15)
INTERPRETATION ECG - MUSE: NORMAL
MAGNESIUM SERPL-MCNC: 2 MG/DL (ref 1.6–2.3)
MCH RBC QN AUTO: 28.4 PG (ref 26.5–33)
MCHC RBC AUTO-ENTMCNC: 30.5 G/DL (ref 31.5–36.5)
MCV RBC AUTO: 93 FL (ref 78–100)
P AXIS - MUSE: 63 DEGREES
PLATELET # BLD AUTO: 344 10E3/UL (ref 150–450)
POTASSIUM BLD-SCNC: 5 MMOL/L (ref 3.4–5.3)
PR INTERVAL - MUSE: 170 MS
PROT SERPL-MCNC: 5.2 G/DL (ref 6.8–8.8)
QRS DURATION - MUSE: 100 MS
QT - MUSE: 356 MS
QTC - MUSE: 472 MS
R AXIS - MUSE: 58 DEGREES
RBC # BLD AUTO: 2.85 10E6/UL (ref 4.4–5.9)
SODIUM SERPL-SCNC: 137 MMOL/L (ref 133–144)
SYSTOLIC BLOOD PRESSURE - MUSE: NORMAL MMHG
T AXIS - MUSE: 47 DEGREES
TACROLIMUS BLD-MCNC: 8 UG/L (ref 5–15)
TME LAST DOSE: NORMAL H
TME LAST DOSE: NORMAL H
VENTRICULAR RATE- MUSE: 106 BPM
WBC # BLD AUTO: 5.6 10E3/UL (ref 4–11)

## 2021-07-15 PROCEDURE — 214N000001 HC R&B CCU UMMC

## 2021-07-15 PROCEDURE — 250N000012 HC RX MED GY IP 250 OP 636 PS 637

## 2021-07-15 PROCEDURE — 99233 SBSQ HOSP IP/OBS HIGH 50: CPT | Mod: 24 | Performed by: PHYSICIAN ASSISTANT

## 2021-07-15 PROCEDURE — 85027 COMPLETE CBC AUTOMATED: CPT | Performed by: PHYSICIAN ASSISTANT

## 2021-07-15 PROCEDURE — 83735 ASSAY OF MAGNESIUM: CPT | Performed by: PHYSICIAN ASSISTANT

## 2021-07-15 PROCEDURE — 71045 X-RAY EXAM CHEST 1 VIEW: CPT | Mod: 26 | Performed by: RADIOLOGY

## 2021-07-15 PROCEDURE — 250N000012 HC RX MED GY IP 250 OP 636 PS 637: Performed by: PHYSICIAN ASSISTANT

## 2021-07-15 PROCEDURE — 97116 GAIT TRAINING THERAPY: CPT | Mod: GP | Performed by: REHABILITATION PRACTITIONER

## 2021-07-15 PROCEDURE — 250N000013 HC RX MED GY IP 250 OP 250 PS 637: Performed by: STUDENT IN AN ORGANIZED HEALTH CARE EDUCATION/TRAINING PROGRAM

## 2021-07-15 PROCEDURE — 85610 PROTHROMBIN TIME: CPT | Performed by: NURSE PRACTITIONER

## 2021-07-15 PROCEDURE — 71045 X-RAY EXAM CHEST 1 VIEW: CPT

## 2021-07-15 PROCEDURE — 36415 COLL VENOUS BLD VENIPUNCTURE: CPT | Performed by: NURSE PRACTITIONER

## 2021-07-15 PROCEDURE — 250N000012 HC RX MED GY IP 250 OP 636 PS 637: Performed by: NURSE PRACTITIONER

## 2021-07-15 PROCEDURE — 97110 THERAPEUTIC EXERCISES: CPT | Mod: GO

## 2021-07-15 PROCEDURE — 250N000013 HC RX MED GY IP 250 OP 250 PS 637

## 2021-07-15 PROCEDURE — 99232 SBSQ HOSP IP/OBS MODERATE 35: CPT | Mod: 24 | Performed by: NURSE PRACTITIONER

## 2021-07-15 PROCEDURE — 82248 BILIRUBIN DIRECT: CPT | Performed by: PHYSICIAN ASSISTANT

## 2021-07-15 PROCEDURE — 250N000013 HC RX MED GY IP 250 OP 250 PS 637: Performed by: PHYSICIAN ASSISTANT

## 2021-07-15 PROCEDURE — 250N000013 HC RX MED GY IP 250 OP 250 PS 637: Performed by: NURSE PRACTITIONER

## 2021-07-15 PROCEDURE — 80197 ASSAY OF TACROLIMUS: CPT | Performed by: NURSE PRACTITIONER

## 2021-07-15 PROCEDURE — 80048 BASIC METABOLIC PNL TOTAL CA: CPT | Performed by: PHYSICIAN ASSISTANT

## 2021-07-15 RX ORDER — MYCOPHENOLATE MOFETIL 250 MG/1
500 CAPSULE ORAL 2 TIMES DAILY
Status: DISCONTINUED | OUTPATIENT
Start: 2021-07-15 | End: 2021-07-19 | Stop reason: HOSPADM

## 2021-07-15 RX ORDER — VALGANCICLOVIR 450 MG/1
450 TABLET, FILM COATED ORAL DAILY
Status: DISCONTINUED | OUTPATIENT
Start: 2021-07-16 | End: 2021-07-18

## 2021-07-15 RX ADMIN — TACROLIMUS 4 MG: 1 CAPSULE ORAL at 07:53

## 2021-07-15 RX ADMIN — NYSTATIN 1000000 UNITS: 500000 SUSPENSION ORAL at 07:53

## 2021-07-15 RX ADMIN — Medication 10 MG: at 21:45

## 2021-07-15 RX ADMIN — ASPIRIN 81 MG CHEWABLE TABLET 81 MG: 81 TABLET CHEWABLE at 07:53

## 2021-07-15 RX ADMIN — NYSTATIN 1000000 UNITS: 500000 SUSPENSION ORAL at 16:40

## 2021-07-15 RX ADMIN — PANTOPRAZOLE SODIUM 40 MG: 40 TABLET, DELAYED RELEASE ORAL at 07:53

## 2021-07-15 RX ADMIN — TACROLIMUS 4 MG: 1 CAPSULE ORAL at 18:08

## 2021-07-15 RX ADMIN — PANTOPRAZOLE SODIUM 40 MG: 40 TABLET, DELAYED RELEASE ORAL at 19:39

## 2021-07-15 RX ADMIN — CALCIUM CARBONATE 600 MG (1,500 MG)-VITAMIN D3 400 UNIT TABLET 1 TABLET: at 18:08

## 2021-07-15 RX ADMIN — PREDNISONE 15 MG: 5 TABLET ORAL at 07:53

## 2021-07-15 RX ADMIN — BUPROPION HYDROCHLORIDE 75 MG: 75 TABLET, FILM COATED ORAL at 07:54

## 2021-07-15 RX ADMIN — MONTELUKAST 10 MG: 10 TABLET, FILM COATED ORAL at 21:44

## 2021-07-15 RX ADMIN — NYSTATIN 1000000 UNITS: 500000 SUSPENSION ORAL at 19:40

## 2021-07-15 RX ADMIN — INSULIN GLARGINE 24 UNITS: 100 INJECTION, SOLUTION SUBCUTANEOUS at 21:54

## 2021-07-15 RX ADMIN — CALCIUM CARBONATE 600 MG (1,500 MG)-VITAMIN D3 400 UNIT TABLET 1 TABLET: at 07:53

## 2021-07-15 RX ADMIN — ALLOPURINOL 200 MG: 100 TABLET ORAL at 07:53

## 2021-07-15 RX ADMIN — AMITRIPTYLINE HYDROCHLORIDE 25 MG: 25 TABLET, FILM COATED ORAL at 21:45

## 2021-07-15 RX ADMIN — MYCOPHENOLATE MOFETIL 1000 MG: 250 CAPSULE ORAL at 07:53

## 2021-07-15 RX ADMIN — FLUTICASONE FUROATE AND VILANTEROL TRIFENATATE 1 PUFF: 100; 25 POWDER RESPIRATORY (INHALATION) at 07:54

## 2021-07-15 RX ADMIN — MYCOPHENOLATE MOFETIL 500 MG: 250 CAPSULE ORAL at 19:40

## 2021-07-15 RX ADMIN — UMECLIDINIUM 1 PUFF: 62.5 AEROSOL, POWDER ORAL at 07:54

## 2021-07-15 RX ADMIN — BUPROPION HYDROCHLORIDE 75 MG: 75 TABLET, FILM COATED ORAL at 19:40

## 2021-07-15 RX ADMIN — NYSTATIN 1000000 UNITS: 500000 SUSPENSION ORAL at 13:08

## 2021-07-15 RX ADMIN — POLYETHYLENE GLYCOL 3350 17 G: 17 POWDER, FOR SOLUTION ORAL at 08:05

## 2021-07-15 RX ADMIN — GABAPENTIN 300 MG: 300 CAPSULE ORAL at 13:08

## 2021-07-15 RX ADMIN — GABAPENTIN 300 MG: 300 CAPSULE ORAL at 19:40

## 2021-07-15 RX ADMIN — Medication 50 MG: at 21:44

## 2021-07-15 RX ADMIN — GABAPENTIN 300 MG: 300 CAPSULE ORAL at 07:53

## 2021-07-15 ASSESSMENT — ACTIVITIES OF DAILY LIVING (ADL)
ADLS_ACUITY_SCORE: 16
ADLS_ACUITY_SCORE: 16
ADLS_ACUITY_SCORE: 15

## 2021-07-15 ASSESSMENT — MIFFLIN-ST. JEOR: SCORE: 1625.88

## 2021-07-15 NOTE — PLAN OF CARE
Temp: 98.3  F (36.8  C) Temp src: Oral BP: 116/80 Pulse: 99   Resp: 16 SpO2: 99 % O2 Device: None (Room air)       D: Admitted with weakness and multiple falls. Found to have R pleural effusion. Hx NICM s/p OHT (5/6/21) c/b R pleural air leak with prolonged CT, afib, COPD, CKD, DM, and anemia    I/A: Jim (he/him) is A&O x4. Tele in place, ST (100s). VSS on RA. PIV in place, SL. Dressings to CT sites WDL. Tramadol given for hand pain. Up with SBA. Slept well overnight    P: Continue to monitor and follow POC. Plan for discharge home vs TCU. Encourage work with therapies. Notify Cards 2 with changes

## 2021-07-15 NOTE — PROGRESS NOTES
Calorie Count  Intake recorded for: 7/14  Total Kcals: 375 Total Protein: 2g  Kcals from Hospital Food: 375  Protein: 2g  Kcals from Outside Food (average):0 Protein: 0g  # Meals Ordered from Kitchen: 2 meals   # Meals Recorded: 1 meal (First - 100% mandarin oranges, 75% spinach salad w/ croutons & balsamic vinaigrette)  # Supplements Recorded: 0

## 2021-07-15 NOTE — PROGRESS NOTES
[facility]   Cardiology II Service / Advanced Heart Failure  Daily Progress Note      Patient: Jim Willingham  MRN: [id number]  Admission Date: 7/8/2021  Hospital Day # 7    Assessment and Plan:    Jim Willingham is a 66 year old male with a history of NICM s/p OHT (5/6/21) postoperative course was complicated by right pleural air leak with prolonged chest tube, paroxysmal atrial fibrillation, COPD, CKDIII, DMII, anemia who presents to the hospital for generalized weakness, fatigue and multiple falls.    Today's Plan:  - CVTS consult for possible fluid aspiration  - follow-up aspergillus and fungitell  - repeat tacrolimus level on Saturday  - defer appetite stimulant for now  - consider resuming low dose Bumex    # Status post OHT on 5/6/21, history of NICM   # c/b right pleural air leak w/prolonged CT, paroxysmal a fib  # Chronic immunosuppression  * TTE 6/29/21 with normal graft function, LVEF of 60-65%  * RHC 7/5/21: RA 18, PA 45/30/35, PCW 28, Kee CO/CI 5.4/2.7 at 195#    Graft Function:   --Volume: euvolemic, Cr improving, may resume low dose Bumex  --BP: at goal  --HR: 90s-100s per baseline    Immunosuppression:   --Prednisone 15 mg daily, then per taper   --Cellcept 1000 mg BID (decreased for infection)  --Tacrolimus 4 mg bid (goal decreased to 8-10 d/t renal function and infection), trough 8 today, repeat on Saturday    Serostatus: CMV: D+/R+, EBV: D+/R+, Toxo D-/R-    Prophylaxis:   --CAV: aspirin 81 mg and HOLD rosuvastatin 10 mg daily for elevated LFTs  --Thrush: Nystatin   --PCP: d/c'ed bactrim given renal dysfunction, s/p pentamidine 7/12, due q28 days  --GI: Protonix   --Osteoporosis: calcium/vitamin D   --CMV: D+/R+, Valcyte 450 mg renally dosed every other day x 3 mos     # Moderate exudative pleural effusion  # Post op R pleural air leak s/p prolonged chest tube  # Recent sepsis d/t CAP, s/p cefepime -> levaquin course ended 7/4  No pulmonary symptoms, denies cough or increased sputum  production. Has remained afebrile, WBC SNL, and lack of other symptoms. However, given immunocompromised status, moderate pleural effusion is concerning. Diagnostic thoracetnesis on 7/10 with exudate, non-purulant. Gram stain negative. TG not suggestive of chylothroax.    Pending tests  - Aerobic pleural culture from 7/10, NGTD  - Fungal pleural culture from 7/10, NGTD  - Aerobic pleural culture from 7/11, NGTD  - Anerobic pleural culture from 7/11, NGTD  - Acid Fast and Mycobacterial stains and culture from 7/11    Other:  - Thoracics following for CT management, removed today  - Holding off on antibiotics for now  - Daily CXR  - f/u chest CT 7/14-7/14 given recent pna to r/o fungal pna, still with cavitation   - transplant ID reconsulted, repeat fungitell and aspergillus 7/14 pending     # Pericardial effusion  # Anterior mediastinal fluid tracking inferiorly subxiphoid  ?post surgical, c/f abscess. Effusion noted on TTE from 7/12, chest CT done 7/13 for above reasons showed anterior mediastinal fluid tracking inferiorly subxiphoid. Cannot do contrast CT due to renal dysfunction. Afebrile, normal WBC.   - transplant ID rec fluid aspiration prior to treatment  - CVTS consulted for recs  - defer abx for now    # Generalized weakness, improved  # Mechanical falls  # Severe malnutrition in the setting of chronic illness   Three day history of generalized fatigue after discharge 7/5/21 after treatment for CAP and sepsis. TSH WNL. With no other symptoms, weakness is likely 2/2 hypervolemia, deconditioning, possible malnutrition. Albumin 2.2.  - PT/OT following, cleared for home  - Nutrition following, on calorie counts     # Elevated liver enzymes 2/2 ?passed gallstone  AST and ALT trending up; 435 and 285 respectively. Asymptomatic. No new medications except Anakinra. No symptoms. Improving without intervention.   - hepatology consulted   - abd US w hepatomegaly with steatosis   - thought possibly 2/2 passed gallstone  "or shock liver though we have low suspicion for the latter  - holding statin and APAP    # MCP pseudogout  Xray of L hand with chondrocalcinosis and capsular calcification at the second and third MCP joints. Synovial fluid analysis negative for gram stain (+ WBC, but no organisms, pseudogout crystals). Culture +staph epi, thought to be a contaminent given radha in broth only on second day of monitoring as well as pseudogout crystals also present in the fluid and improvement on anakinra.   - improved on anankira 100 mg EOD  x 3 doses   - decreased allopurinol for renal function from 300 to 200 mg    # ?Esphageal dysmotility  Noted incidentally on CT chest 7/13.   - consider esophagram if symptomatic    # WALTER on CKD Stage III, resolved  Cr worsening this admission. Likely multifactorial fluid status and medication induced. Cr improving w holding Bumex and lower taco goal.   - stopped bactrim  - renal dosing   - decreased tacrolimus goal     # DM II  - PTA insulin regimen      # Depression  - PTA Wellbutrin 75 BID  - PTA Amitriptyline 25 at bedtime     # COPD   - PTA Monetlukast, Trelegy Ellipta, Albuterol    Diet: regular diet  DVT Prophylaxis: ambulatory  Russell Catheter: Not present  Code Status: Full    Kiesha Wilder, JOSE, NP-C  Advanced Heart Failure/Cardiology 2 Service  Pager     ================================================================    Subjective/24-Hr Events:   Last 24 hr care team notes reviewed. No changes today. Working well with therapies. Afebrile, denies new pains, shortness of breath, malaise. Appetite is improving.      ROS:  4 point ROS including respiratory, CV, GI and  (other than that noted in the HPI) is negative.     Medications: Reviewed in EPIC.     Physical Exam:   /85 (BP Location: Right arm)   Pulse 104   Temp 97.8  F (36.6  C) (Oral)   Resp 16   Ht 1.727 m (5' 8\")   Wt 86.1 kg (189 lb 14.4 oz)   SpO2 97%   BMI 28.87 kg/m      GENERAL: Appears comfortable, in " no distress.  HEENT: Eye symmetrical, no discharge or icterus bilaterally. Mucous membranes moist and without lesions.  NECK: Supple, JVD difficult to appreciate.    CV: Tachycardic per baseline, +S1S2, no murmur, rub, or gallop.   RESPIRATORY: Respirations regular, even, and unlabored. Lungs CTA throughout, no crackles.    GI: Soft and non distended with normoactive bowel sounds present in all quadrants. No tenderness, rebound, guarding.   EXTREMITIES: No peripheral edema. 2+ bilateral pedal pulses. All extremities are warm and well perfused  NEUROLOGIC: Alert and interacting appropriately. No focal deficits.   MUSCULOSKELETAL: No joint swelling or tenderness.   SKIN: No jaundice. No rashes or lesions.     Labs:  CMP  Recent Labs   Lab 07/15/21  0557 07/14/21  1222 07/14/21  0554 07/13/21 2118 07/13/21  2053 07/13/21  0545 07/12/21  2119 07/12/21  1115 07/11/21  1643 07/11/21  0710 07/10/21  2031 07/10/21  1741 07/10/21  0624 07/09/21  0632     --  135  --   --  134 131* 134  --  132*   < > 132* 135 136   POTASSIUM 5.0  --  4.7  --   --  4.8 5.2 4.9  --  5.1   < > 5.6* 4.6 4.8   CHLORIDE 108  --  105  --   --  105 102 104  --  104   < > 103 105 108   CO2 23  --  25  --   --  25 21 21  --  23   < > 24 23 23   ANIONGAP 6  --  5  --   --  4 8 9  --  6   < > 5 6 5   * 177* 153* 332*  --  206* 316* 181*   < > 125*   < > 258* 77 122*   BUN 41*  --  45*  --   --  49* 51* 49*  --  49*   < > 44* 46* 41*   CR 1.73*  --  2.02*  --  2.29* 2.10* 2.26* 2.44*  --  2.75*   < > 2.48* 2.40* 2.33*   GFRESTIMATED 41*  --  34*  --  29* 32* 30* 27*  --  23*   < > 26* 27* 28*   GFRESTBLACK  --   --   --   --   --   --   --   --   --  27*  --  31* 32* 33*   QAMAR 7.8*  --  8.1*  --   --  7.6* 8.0* 8.0*  --  7.9*   < > 8.1* 7.8* 7.5*   MAG 2.0  --  1.9  --   --  2.0  --  1.9  --  1.8  --   --  1.8 1.6   PROTTOTAL 5.2*  --  5.0*  --   --  5.1*  --  5.9*  --   --   --   --  5.3*  --    ALBUMIN 2.3*  --  2.2*  --   --  2.2*  --   2.5*  --   --   --   --   --   --    BILITOTAL 0.3  --  0.2  --   --  0.9  --  1.1  --   --   --   --   --   --    ALKPHOS 458*  --  432*  --   --  502*  --  324*  --   --   --   --   --   --    *  --  105*  --   --  435*  --  100*  --   --   --   --   --   --    *  --  184*  --   --  285*  --  79*  --   --   --   --   --   --     < > = values in this interval not displayed.       CBC  Recent Labs   Lab 07/15/21  0557 07/14/21  0554 07/13/21  0545 07/12/21  2119   WBC 5.6 6.0 5.7 4.8   RBC 2.85* 2.89* 2.81* 3.39*   HGB 8.1* 8.1* 8.0* 9.5*   HCT 26.6* 27.2* 26.4* 31.6*   MCV 93 94 94 93   MCH 28.4 28.0 28.5 28.0   MCHC 30.5* 29.8* 30.3* 30.1*   RDW 15.9* 15.8* 15.7* 15.7*    339 313 330       INR  Recent Labs   Lab 07/15/21  0557 07/14/21  0554 07/10/21  0929   INR 1.09 1.20* 1.06       Time/Communication  I personally spent a total of 30 minutes. Of that >15 minutes was counseling/coordination of patient's care. Plan of care discussed with patient. See my note above for details.    Patient discussed with Dr. Montgomery.

## 2021-07-15 NOTE — PROGRESS NOTES
Transplant Infectious Disease Progress Note     Patient:  Jim Willingham   Date of birth 1955, Medical record number [id number]  Date of Visit:  07/15/2021  Date of Admission: 7/8/2021  Consult Requester:Nicola Seth MD          Assessment and Plan:   Recommendations:  1. Continue to monitor off of empiric abx  2. Recommend aspiration of mediastinal fluid collection for cell counts, gram stain, and cultures  3. BD glucan and Aspergillus galactomannan pending  4. Following previously collected pleural effusion cultures  5. Continue Valcyte and inhaled pentamidine ppx (last pentamidine 7/12/21)  6. Check EBV monthly (next check around 8/10/21)      Assessment/Discussion:  Jim Willingham is a 66 year old male with history significant for nonischemic cardiomyopathy now s/p heart transplant on 5/6/21 immunosuppressed on tacrolimus, mycophenolate, and prednisone) with course complicated by right pleural effusion and paroxysmal atrial fibrillation; additional history of COPD, type II DM, CKD stage III, chronic anemia, and gout. He was recently hospitalized at [facility] (6/28-7/5/21) with fever presumed due to right lower lobe pneumonia and right pleural exudative effusion s/p broad spectrum antibiotics and thoracentesis. Readmitted to [facility] on 7/8/21 with persistent fatigue, generalized weakness, left second finger MCP acute joint swelling, re-accumulation of right exudative pleural effesion.    #Right lower lobe cavitary lesion  #Left upper lobe 3mm nodules x2  Initially with RLL patchy opacity 6/28/21and exudative effusion. Was treated with cefepime (6/27-7/3), Azithromycin (6/29-7/1), then narrowed levofloxacin through 7/5/21. Initial work up included aspergillus galactomannan, blasto serum ag, fungal ab panel, histo urine ag, and BD glucan, all of which were unremarkable. CT repeated on 7/13, revealing RLL cavitary lesion and KALI nodules x3. Currently without fevers, leukocytosis, or respiratory symptoms. Previously had  cough, which has resolved, and feels shortness of breath is improved. Unclear if seeing progression or resolution of this process. Fungal infection such as aspergillus remains in ddx. Recommend repeating BD glucan and aspergillus galactomannan. Will consider next steps based on these results.    #Mediastinal fluid collection  CT (7/13) noted fluid in anterior mediastinum tracking inferiorly subxiphoid. Unfortunately cannot use contrast 2/2 renal function. Recommend aspiration of collection if accessible to send for cell counts, gram stain, and Cx. Would not start empiric abx at this time, await further information from aspiration.    #Right pleural effusion, exudative  Cultures from 6/29 and 7/10 remain no growth to date. Both gram stains without organisms though noted WBCs. Was treated with empiric broad spectrum abx from 6/27-7/5. Now with re-accumulation s/p chest tube placement. No fevers, leukocytosis, or symptoms of pneumonia this admission. Remains exudative per Light's criteria. Feel that this is appears more consistent with non-infectious process rather than a bacterial empyema. Cultures repeated with chest tube placement on 7/11. Would not give another course of empiric abx at this time.     #Left second finger MCP joint pseudogout:  Pseudogout based on micro exam + for CPPD crystals. Late growth of S.epidermidis on synovial fluid culture, broth only at about 40 hours. Had been on Bactrim for PJP ppx, no fevers, leukocytosis, and procalcitonin negative. Now continues to improve. Most c/w contaminant.    #EBV viremia, low-grade  EBV 3509 with log 3.5 (6/7/21), undetected 6/28/21, then 1618 with log 3.2. Follow monthly EBV.      Previous ID Issues:  - CoNS bacteremia pre-transplant (10/2020) while he had LVAD in place       Other ID issues:  - QTc interval:  472msec (7/12/21)  - Bacterial prophylaxis:  not indicated  - Pneumocystis prophylaxis:  inhaled pentamidine   - Viral serostatus: CMV +, EBV +, HSV-1+,  "VZV +, Toxo - (donor result not available)  - Viral prophylaxis:  Valcyte   - Fungal prophylaxis:  not indicated  - Immunization status:  Starting at three months post-transplant (early 8/21), should begin to receive pneumococcal, Shingrix, HBV, and Covid-19 vaccine series.  - Gamma globulin status:  838 pre-transplant (12/21/20)  - Isolation status: Good hand hygiene       Yadira Young PA-C  Infectious Disease  Pager # [phone]  07/15/2021         Interim History and Events:     Feeling same as yesterday. States yesterday was a busy day with therapies and imaging and felt tired at the end of the day. No new symptoms overnight. Breathing feels good today, a little short of breath at end of therapy but no more than previous. Left shoulder has been sore with certain movements (has rotator cuff tear). No coughing, nausea, vomiting, diarrhea, chest pain.          HPI:   Transplants:  5/6/2021 (Heart), Postoperative day:  70     Per Transplant ID Consult 7/11/21: \"Mr. Willingham is a 66 year old gentleman immunosuppressed (tacrolimus, mycophenolate, prednisone, now also anakinra) s/p a 5/6/21 heart transplant for nonischemic cardiomyopathy with a post-transplant course complicated by a right pleural air leak requiring a prolonged chest tube and paroxysmal atrial fibrillation.  He also has a history of COPD, type 2 diabetes mellitus, stage three chronic kidney disease, chronic anemia, and gout.  He was most recently previously hospitalized at [facility] from 6/28 - 7/5/21 with febrile sepsis due to right lower lobe healthcare-associated pneumonia, although no positive respiratory microbiology was apparently obtained (results are difficult to be certain about today after the [employer] laboratory system change last night).  One out of two blood cultures grew a presumably-contaminant Staph epidermidis.  In addition, he had an exudative right pleural effusion which was drained of 270 ml by thoracentesis on 6/29/21 with cultures negative.  The " pneumonia and suspected empyema was treated empirically with cefepime (6/28 - 7/3/21), levofloxacin (7/3 - 7/4/21 to complete a five day course), azithromycin (6/29 - 7/1/21), and vancomycin (6/29 - 7/1/21, for the Staph epidermidis blood culture).  He defervesced quickly and has been afebrile since 6/30/21.     He was now re-admitted three days later to the [facility] Cardiology 2 service on 7/8/21 PM with persistent ambulatory generalized weakness and fatigue that had not improved since his 7/5/21 discharge.  He fell about three times during those three days and complained of intermittent orthostatic dizziness as well as a weak appetite and inanition.  In addition, two days prior to admission, he began to develop left second finger MCP swelling that worsened on the day of re-admission with erythema and marked pain to movement and palpation, similar to his past gout flares in that joint.  He lacks other arthralgias or swelling of other joints.  Upon admission, he lacked fever, chills, EENT symptoms, resting dyspnea, cough, chest pain, nausea / emesis, abdominal pain, diarrhea, or additional complaints upon admission.  Since then, he has remained afebrile (T max 98.7 degrees F) and has had a steadily normal peripheral WBC in the 5.6 - 8.3 range, with ahigh CRP (87 - 98) and ESR (72 on 7/8/21) but a negative admission procalcitonin (0.18 on 7/8/21).  So far, the only antimicrobial he has received during this hospitalization is prophylactic TMP-SMX.  A left hand x-ray on 7/8/21 showed chondrocalcinosis and capsular calcification of two left (second and third) MCP joints.  Arthrocentesis was performed on the left second MCP joint by Orthopedics Surgery Consult on 7/8/21 with synovial fluid yielding CPPD crystals and a negative Gram stain (for organisms) with many PMNs, consistent with pseudogout. Ankirna was started on 7/9/21 and the joint is somewhat improved but still now swollen and painful.  Yesterday, 7/10/21, the  "7/8/21 left second MCP synovial aspirated fluid culture belatedly isolated a Staphylococcus epidermidis (susceptibilities pending) at about 42 hours of incubation.  Transplant ID was consulted regarding whether this needs to be treated.     In addition, an admission chest x-ray on 7/8/21 and a repeat chest x-ray on 7/10/21 showed re-accumulated right pleural effusion (after it had previously been drained on 6/29/21.  A diagnostic thoracentesis performed on 7/10/21 once again yielded an exudate (, pleural total protein 2.7, glucose 102) with 384 WBC (52% neutrophils, 28% L, 20% M).  The pleural Gram stain showed no organisms.  Cultures are reportedly no growth to date.  A repeat therapeutic right thoracentesis with placement of a drainage tube is planned for today and additional cultures will be sent.  Beyond these two issues, Mr. Willingham is overall slowly improving and says his strength and energy are slightly better than upon admission.  He is eating partial meals.  His pain control is adequate. He has received diuresis and his breathing is stable on room air.  He lacks additional new complaints over the past couple of days.\"       ROS:  Focused 5 point review of systems was completed, pertinent positives and negatives are outlined above             Physical Examination:  Temp: 97.4  F (36.3  C) Temp src: Oral BP: 118/84 Pulse: 103   Resp: 18 SpO2: 97 % O2 Device: None (Room air)      Vitals:    07/10/21 0700 07/12/21 0135 07/13/21 1031 07/14/21 0614   Weight: 87.5 kg (192 lb 14.4 oz) 86.8 kg (191 lb 6.4 oz) 86.2 kg (190 lb) 86.7 kg (191 lb 1.6 oz)    07/15/21 0550   Weight: 86.1 kg (189 lb 14.4 oz)       Constitutional: Awake, alert, and oriented. Pleasant and cooperative with exam.  HEENT: NC/AT, anicteric sclera, conjunctiva non-injected, OP with MMM  Respiratory: No increased work of breathing, CTAB, no crackles or wheezing.  Cardiovascular: RRR, no murmur noted. No peripheral edema.  GI: Normal bowel " sounds, soft, non-distended and non-tender.  Skin: Warm, dry, well-perfused. No bruising, bleeding, rashes, or lesions on limited exam.  Extremities: Warm, no mottling or edema. Left 2nd digit MCP joint swollen but improving, able to make a fist and fully extend finger.  Vascular access:  PIV on RUE CDI, non-tender, no surrounding erythema.    Medications:    allopurinol  200 mg Oral Daily     amitriptyline  25 mg Oral At Bedtime     artificial tears  1 inch Both Eyes At Bedtime     aspirin  81 mg Oral Daily     buPROPion  75 mg Oral BID     calcium carbonate 600 mg-vitamin D 400 units  1 tablet Oral BID w/meals     fluticasone-vilanterol  1 puff Inhalation Daily     gabapentin  300 mg Oral QAM     gabapentin  300 mg Oral BID     insulin glargine  24 Units Subcutaneous At Bedtime     montelukast  10 mg Oral At Bedtime     mycophenolate  1,000 mg Oral BID     nystatin  1,000,000 Units Swish & Swallow 4x Daily     pantoprazole  40 mg Oral BID     polyethylene glycol  17 g Oral BID     predniSONE  15 mg Oral QAM     [Held by provider] rosuvastatin  10 mg Oral Daily     sodium chloride (PF)  3 mL Intracatheter Q8H     tacrolimus  4 mg Oral QAM     tacrolimus  4 mg Oral QPM     umeclidinium  1 puff Inhalation Daily     [START ON 7/16/2021] valGANciclovir  450 mg Oral Daily       Infusions/Drips:    - MEDICATION INSTRUCTIONS -         Laboratory Data:   No results found for: ACD4    Inflammatory Markers    Recent Labs   Lab Test 07/09/21  0632 07/08/21  1746 07/08/21  1533 06/28/21  2208 05/27/21  0449 10/27/20  1250 10/20/20  1305 10/13/20  1320   SED  --  72*  --   --   --  10 8 10   CRP 98.0*  --  87.0* 27.0* 140.0* 3.5 11.0* 12.0*       Metabolic Studies       Recent Labs   Lab Test 07/15/21  0557 07/14/21  1222 07/14/21  0554 07/13/21  2118 07/13/21  2053 07/13/21  0545 07/12/21  2119 07/12/21  1115 07/12/21  1115 07/11/21  2240 07/11/21  1643 07/01/21  0644 06/30/21  0339 06/25/21  1133 06/24/21  0905     --   135  --   --  134 131*  --  134  --  131*   < > 130*  --  134   POTASSIUM 5.0  --  4.7  --   --  4.8 5.2  --  4.9  --  5.3   < > 4.8  --  4.3   CHLORIDE 108  --  105  --   --  105 102  --  104  --  102   < > 101  --  102   CO2 23  --  25  --   --  25 21  --  21  --  21   < > 25  --  23   ANIONGAP 6  --  5  --   --  4 8  --  9  --  8   < > 4  --  9   BUN 41*  --  45*  --   --  49* 51*  --  49*  --  49*   < > 47*  --  41*   CR 1.73*  --  2.02*  --  2.29* 2.10* 2.26*  --  2.44*  --  2.70*   < > 2.24*  --  2.23*   GFRESTIMATED 41*  --  34*  --  29* 32* 30*  --  27*  --  24*   < > 30*  --  30*   * 177* 153* 332*  --  206* 316*   < > 181*  --  184*   < > 157*  --  320*   QAMAR 7.8*  --  8.1*  --   --  7.6* 8.0*  --  8.0*  --  7.9*  --  8.3*  --  8.0*   PHOS  --   --   --   --   --   --   --   --   --   --   --   --  2.8  --  3.5   MAG 2.0  --  1.9  --   --  2.0  --   --  1.9   < >  --    < > 2.2   < > 1.8    < > = values in this interval not displayed.     Recent Labs   Lab Test 07/08/21  1533 06/30/21 0339 06/28/21 2208 05/26/21  2324   LACT 1.0 0.7 1.4 1.6     Recent Labs   Lab Test 06/21/21  0933 06/07/21  0807   CKT 31 49     Recent Labs   Lab Test 05/04/21  2313 01/07/21  0719 11/05/20  0907   A1C 5.1 6.2* 5.7*       Hepatic Studies    Recent Labs   Lab Test 07/15/21  0557 07/14/21  0554 07/13/21  2053 07/13/21  0545 07/12/21  1115   BILITOTAL 0.3 0.2  --  0.9 1.1   ALKPHOS 458* 432*  --  502* 324*   ALBUMIN 2.3* 2.2*  --  2.2* 2.5*   * 105*  --  435* 100*   * 184*  --  285* 79*   LDH  --   --  252*  --   --        Pancreatitis testing    Recent Labs   Lab Test 05/26/21  1340 05/04/21  2313 11/05/20  0907 08/05/20  0335 07/06/18  0856 06/08/17  0944   AMYLASE 49 93  --   --   --   --    LIPASE 25*  --   --  168  --   --    TRIG  --   --  91  --  123 124       Hematology Studies      Recent Labs   Lab Test 07/15/21  0557 07/14/21  0554 07/13/21  0545 07/12/21  2119 07/11/21  0710   WBC 5.6 6.0 5.7  4.8 7.8   HGB 8.1* 8.1* 8.0* 9.5* 8.7*   HCT 26.6* 27.2* 26.4* 31.6* 28.4*    339 313 330 358     Recent Labs   Lab Test 07/08/21  1746 07/08/21  1533 07/07/21  0858 07/05/21  0642 07/04/21  0721   ANEU 7.7 Canceled, Test credited 3.5 2.6 2.6   ALYM 0.3* Canceled, Test credited 1.3 0.8 0.4*   VIJAY 0.1 Canceled, Test credited 0.5 0.5 0.4   AEOS 0.0 Canceled, Test credited 0.1 0.0 0.0       Arterial Blood Gas Testing    Recent Labs   Lab Test 06/28/21  2247 05/09/21  0956 05/09/21  0902 05/07/21  1900 05/07/21  1700 05/07/21  1420   PH  --   --   --  7.40 7.38 7.39   PCO2  --   --   --  38 41 40   PO2  --   --   --  101 102 130*   HCO3  --   --   --  23 24 24   O2PER 21.0 21  21 REPORT AMENDED: 2LNC Canceled, Test credited  2L 40        Urine Studies     Recent Labs   Lab Test 07/09/21  0640 06/30/21  0640 05/25/21  1230   URINEPH 5.5 5.0 5.5   NITRITE Negative Negative Negative   LEUKEST Negative Negative Negative   WBCU 3 4 4       Vancomycin Levels     Recent Labs   Lab Test 05/08/21  2004 05/07/21  1701 10/27/20  1250 10/20/20  1305   VANCOMYCIN 18.5 16.0 19.3 16.5       Tobramycin levels     No lab results found.    Gentamicin levels    No lab results found.    CSF testing   No lab results found.      Microbiology:  Culture Micro   Date Value Ref Range Status   07/09/2021 No growth  Final   07/08/2021 (A)  Preliminary    On day 2, isolated in broth only:  Staphylococcus epidermidis     07/08/2021   Preliminary    Critical Value/Significant Value, preliminary result only, called to and read back by  Neli Boswell RN 1527 7/10/21 AM      07/08/2021 Culture negative monitoring continues  Preliminary   07/08/2021 No growth  Final   07/08/2021 No growth  Final   06/30/2021 No growth  Final   06/30/2021 No growth  Final   06/29/2021 No growth  Final   06/29/2021 No anaerobes isolated  Final   06/29/2021 Culture negative after 2 weeks  Preliminary   06/29/2021   Preliminary    Culture received and in progress.   Positive AFB results are called as soon as detected.    Final report to follow in 7 to 8 weeks.     06/29/2021   Preliminary    Assayed at [facility] [address], [facility] [place] [address] [phone]   06/29/2021 No growth after 16 days  Preliminary   06/28/2021 No growth  Final   06/28/2021 (A)  Final    Cultured on the 2nd day of incubation:  Staphylococcus epidermidis     06/28/2021   Final    Critical Value/Significant Value, preliminary result only, called to and read back by  Lisa Rush RN 6/30/21 @ 0427      06/28/2021   Final    (Note)  POSITIVE for STAPHYLOCOCCUS EPIDERMIDIS and POSITIVE for the mecA  gene (resistant to methicillin) by [employer] multiplex nucleic acid  test. Final identification and antimicrobial susceptibility testing  will be verified by standard methods.    Specimen tested with nu[employer]igene multiplex, gram-positive blood culture  nucleic acid test for the following targets: Staph aureus, Staph  epidermidis, Staph lugdunensis, other Staph species, Enterococcus  faecalis, Enterococcus faecium, Streptococcus species, S. agalactiae,  S. anginosus grp., S. pneumoniae, S. pyogenes, Listeria sp., mecA  (methicillin resistance) and Lucía/B (vancomycin resistance).    Critical Value/Significant Value called to and read back by Darcie Saeed RN at 0701 6.30.21. amd     05/26/2021 No growth  Final   05/26/2021 No growth  Final   05/26/2021 No acid fast bacilli isolated after 6 weeks  Final   05/25/2021 Heavy growth  Normal jaxon    Final   05/17/2021 No growth  Final   05/17/2021 No growth  Final   05/15/2021 No growth  Final   11/04/2020 No growth  Final   09/23/2020 No growth  Final   09/22/2020 No growth  Final   09/21/2020 No growth  Final   09/20/2020 No growth  Final   09/19/2020 No growth  Final   09/18/2020 (A)  Final    Cultured on the 2nd day of incubation:  Staphylococcus hominis  Susceptibility testing done on previous specimen     09/18/2020   Final    Critical  Value/Significant Value, preliminary result only, called to and read back by  Tamy Leventhal RN 1724 9/20/20 AM     09/18/2020 No growth  Final   09/17/2020 (A)  Final    Cultured on the 1st day of incubation:  Staphylococcus hominis     09/17/2020   Final    Critical Value/Significant Value, preliminary result only, called to and read back by  John Howard RN @1414 09/18/2020 hdh/lm     09/17/2020   Final    (Note)  POSITIVE for Staphylococci other than S.aureus, S.epidermidis and  S.lugdunensis, by [employer] multiplex nucleic acid test.  Coagulase-negative staphylococci are the most common venipuncture or  collection associated skin CONTAMINANTS grown in blood cultures.  Final identification and antimicrobial susceptibility testing will be  verified by standard methods.    Specimen tested with Verigene multiplex, gram-positive blood culture  nucleic acid test for the following targets: Staph aureus, Staph  epidermidis, Staph lugdunensis, other Staph species, Enterococcus  faecalis, Enterococcus faecium, Streptococcus species, S. agalactiae,  S. anginosus grp., S. pneumoniae, S. pyogenes, Listeria sp., mecA  (methicillin resistance) and Lucía/B (vancomycin resistance).    Critical Value/Significant Value called to and read back by Le Carmona RN. @1712. 9.18.20. BS.      09/17/2020 No growth  Final   01/21/2020 No growth  Final   01/21/2020 (A)  Final    Heavy growth  Haemophilus influenzae  Beta lactamase negative  Beta-lactamase negative Haemophilus influenzae are usually susceptible to ampicillin,   amoxacillin/clavulanic acid, levofloxacin, and 3rd generation cephalosporins, such as   ceftriaxone.     01/20/2020 No growth  Final   01/20/2020 No growth  Final   01/16/2020 (A)  Final    10,000 to 50,000 colonies/mL  Coagulase negative Staphylococcus     01/15/2020 No growth  Final   01/15/2020 (A)  Final    Cultured on the 1st day of incubation:  Staphylococcus epidermidis     01/15/2020   Final    Critical  Value/Significant Value, preliminary result only, called to and read back by  Alphonse Cuba RN @ 1920. 1/16/20. AV     01/15/2020   Final    (Note)  POSITIVE for STAPHYLOCOCCUS EPIDERMIDIS and NEGATIVE for the mecA  gene (not resistant to methicillin) by Verigene nucleic acid test.  The mecA gene was not detected. Final identification and  antimicrobial susceptibility testing will be verified by standard  methods.    Specimen tested with Verigene multiplex, gram-positive blood culture  nucleic acid test for the following targets: Staph aureus, Staph  epidermidis, Staph lugdunensis, other Staph species, Enterococcus  faecalis, Enterococcus faecium, Streptococcus species, S. agalactiae,  S. anginosus grp., S. pneumoniae, S. pyogenes, Listeria sp., mecA  (methicillin resistance) and Lucía/B (vancomycin resistance).    Critical Value/Significant Value called to and read back by Dr. Leos, @2214 01/16/20.DH.     04/13/2018 No growth  Final   04/13/2018 No growth  Final   05/24/2017 No growth  Final   05/23/2017 Moderate growth Normal jaxon  Final       Last check of C difficile  No results found for: CDBPCT    Imaging:  CT Chest w/o Contrast (7/13/21)  IMPRESSION:   1. Right lower lobe pneumonia as well as cavitation of prior right  lower lobe medial consolidation. Likely atypical organisms. Interval  increased density of a 3 mm nodule in the left upper lobe since  6/29/2021 suggestive for additional nidus of infection. Recommend  continued short-term interval imaging follow-up.  2. Small right hydropneumothorax with stable positioning of posterior  right basilar chest tube. Fluid component is significantly decreased  when compared to prior exam 6/29/2021.  3. Significant debris within the esophagus which may be seen in the  setting of esophageal dysmotility. Follow-up esophagram may be  considered for further evaluation.  4. Mediastinal adenopathy, likely reactive.  5. Heart transplant changes, with fluid in the  anterior mediastinum  tracking inferiorly subxiphoid. A contrast enhanced CT could be  helpful if concern for abscess, or FNA could be considered.    CXR (7/13/21)  Impression:   1. Small bilateral pleural effusions with bibasilar streakiness,  likely atelectasis.  2. Linear opacity overlying the right midlung, likely atelectasis.    CT Chest/Abd/Pelvis w/o contrast (6/28/21)                                                                   IMPRESSION:   1. A small patchy opacity in the central aspect of the right lower lobe, new since 05/26/2021. This most likely represents pneumonia. Several additional patchy opacities that had been present within the lungs on the relatively recent comparison study   have resolved.  2. Moderate-sized right pleural effusion, increased in size since comparison study. A very small left pleural effusion that had been present on the comparison study has resolved.  3. No acute abnormality identified in the abdomen or pelvis.     ECHO:  7/12/21 Limited  Interpretation Summary  Limited Echo to assess for pericardial effusion.  Global and regional left ventricular function is normal with an EF of 60-65%.  Right ventricular function, chamber size, wall motion, and thickness are  normal.  Small loculated pericardial effusion with organized material anterior to right  ventricle measuring 1.4 cm in maximal diameter. No evidence of tamponade  physiology.  The inferior vena cava is normal.     This study was compared with the study from 6/29/2021. Small loculated  pericardial effusion is now present.

## 2021-07-16 ENCOUNTER — APPOINTMENT (OUTPATIENT)
Dept: GENERAL RADIOLOGY | Facility: CLINIC | Age: 66
DRG: 186 | End: 2021-07-16
Payer: COMMERCIAL

## 2021-07-16 LAB
1,3 BETA GLUCAN SER-MCNC: 67 PG/ML
ALBUMIN SERPL-MCNC: 2.4 G/DL (ref 3.4–5)
ALP SERPL-CCNC: 582 U/L (ref 40–150)
ALT SERPL W P-5'-P-CCNC: 256 U/L (ref 0–70)
ANION GAP SERPL CALCULATED.3IONS-SCNC: 4 MMOL/L (ref 3–14)
AST SERPL W P-5'-P-CCNC: 254 U/L (ref 0–45)
BACTERIA PLR CULT: NO GROWTH
BACTERIA PLR CULT: NO GROWTH
BILIRUB DIRECT SERPL-MCNC: <0.1 MG/DL (ref 0–0.2)
BILIRUB SERPL-MCNC: 0.3 MG/DL (ref 0.2–1.3)
BUN SERPL-MCNC: 34 MG/DL (ref 7–30)
CALCIUM SERPL-MCNC: 8.1 MG/DL (ref 8.5–10.1)
CHLORIDE BLD-SCNC: 110 MMOL/L (ref 94–109)
CO2 SERPL-SCNC: 23 MMOL/L (ref 20–32)
CREAT SERPL-MCNC: 1.39 MG/DL (ref 0.66–1.25)
CREAT SERPL-MCNC: 1.41 MG/DL (ref 0.66–1.25)
CRP SERPL-MCNC: 4.2 MG/L (ref 0–8)
ERYTHROCYTE [DISTWIDTH] IN BLOOD BY AUTOMATED COUNT: 16.2 % (ref 10–15)
GALACTOMANNAN AG SERPL QL IA: NEGATIVE
GALACTOMANNAN AG SPEC IA-ACNC: 0.05
GFR SERPL CREATININE-BSD FRML MDRD: 52 ML/MIN/1.73M2
GFR SERPL CREATININE-BSD FRML MDRD: 53 ML/MIN/1.73M2
GLUCOSE BLD-MCNC: 53 MG/DL (ref 70–99)
GLUCOSE BLDC GLUCOMTR-MCNC: 157 MG/DL (ref 70–99)
GRAM STAIN RESULT: NORMAL
GRAM STAIN RESULT: NORMAL
HCT VFR BLD AUTO: 30.2 % (ref 40–53)
HGB BLD-MCNC: 8.8 G/DL (ref 13.3–17.7)
HGB BLD-MCNC: 8.8 G/DL (ref 13.3–17.7)
INR PPP: 1.07 (ref 0.85–1.15)
MAGNESIUM SERPL-MCNC: 1.9 MG/DL (ref 1.6–2.3)
MCH RBC QN AUTO: 28 PG (ref 26.5–33)
MCHC RBC AUTO-ENTMCNC: 29.1 G/DL (ref 31.5–36.5)
MCV RBC AUTO: 96 FL (ref 78–100)
OBSERVATION IMP: ABNORMAL
OXYHGB MFR BLDA: 64 % (ref 92–100)
PLATELET # BLD AUTO: 367 10E3/UL (ref 150–450)
POTASSIUM BLD-SCNC: 4.6 MMOL/L (ref 3.4–5.3)
PROT SERPL-MCNC: 5.6 G/DL (ref 6.8–8.8)
RBC # BLD AUTO: 3.14 10E6/UL (ref 4.4–5.9)
SODIUM SERPL-SCNC: 137 MMOL/L (ref 133–144)
WBC # BLD AUTO: 6.7 10E3/UL (ref 4–11)

## 2021-07-16 PROCEDURE — 99233 SBSQ HOSP IP/OBS HIGH 50: CPT | Mod: 24 | Performed by: PHYSICIAN ASSISTANT

## 2021-07-16 PROCEDURE — 71045 X-RAY EXAM CHEST 1 VIEW: CPT | Mod: 26 | Performed by: RADIOLOGY

## 2021-07-16 PROCEDURE — 250N000013 HC RX MED GY IP 250 OP 250 PS 637: Performed by: NURSE PRACTITIONER

## 2021-07-16 PROCEDURE — 250N000012 HC RX MED GY IP 250 OP 636 PS 637: Performed by: NURSE PRACTITIONER

## 2021-07-16 PROCEDURE — 85018 HEMOGLOBIN: CPT

## 2021-07-16 PROCEDURE — 85027 COMPLETE CBC AUTOMATED: CPT | Performed by: PHYSICIAN ASSISTANT

## 2021-07-16 PROCEDURE — 99232 SBSQ HOSP IP/OBS MODERATE 35: CPT | Mod: 24 | Performed by: NURSE PRACTITIONER

## 2021-07-16 PROCEDURE — 250N000013 HC RX MED GY IP 250 OP 250 PS 637: Performed by: STUDENT IN AN ORGANIZED HEALTH CARE EDUCATION/TRAINING PROGRAM

## 2021-07-16 PROCEDURE — 82810 BLOOD GASES O2 SAT ONLY: CPT | Performed by: INTERNAL MEDICINE

## 2021-07-16 PROCEDURE — 85610 PROTHROMBIN TIME: CPT | Performed by: NURSE PRACTITIONER

## 2021-07-16 PROCEDURE — 250N000012 HC RX MED GY IP 250 OP 636 PS 637: Performed by: PHYSICIAN ASSISTANT

## 2021-07-16 PROCEDURE — 250N000012 HC RX MED GY IP 250 OP 636 PS 637

## 2021-07-16 PROCEDURE — 82565 ASSAY OF CREATININE: CPT

## 2021-07-16 PROCEDURE — 250N000013 HC RX MED GY IP 250 OP 250 PS 637

## 2021-07-16 PROCEDURE — C1894 INTRO/SHEATH, NON-LASER: HCPCS | Performed by: INTERNAL MEDICINE

## 2021-07-16 PROCEDURE — 71045 X-RAY EXAM CHEST 1 VIEW: CPT

## 2021-07-16 PROCEDURE — 36415 COLL VENOUS BLD VENIPUNCTURE: CPT | Performed by: NURSE PRACTITIONER

## 2021-07-16 PROCEDURE — 82810 BLOOD GASES O2 SAT ONLY: CPT

## 2021-07-16 PROCEDURE — 250N000013 HC RX MED GY IP 250 OP 250 PS 637: Performed by: PHYSICIAN ASSISTANT

## 2021-07-16 PROCEDURE — 214N000001 HC R&B CCU UMMC

## 2021-07-16 PROCEDURE — 272N000002 HC OR SUPPLY OTHER OPNP: Performed by: INTERNAL MEDICINE

## 2021-07-16 PROCEDURE — 86140 C-REACTIVE PROTEIN: CPT | Performed by: NURSE PRACTITIONER

## 2021-07-16 PROCEDURE — 93505 ENDOMYOCARDIAL BIOPSY: CPT | Mod: 26 | Performed by: INTERNAL MEDICINE

## 2021-07-16 PROCEDURE — 80048 BASIC METABOLIC PNL TOTAL CA: CPT | Performed by: PHYSICIAN ASSISTANT

## 2021-07-16 PROCEDURE — 250N000009 HC RX 250: Performed by: INTERNAL MEDICINE

## 2021-07-16 PROCEDURE — 82040 ASSAY OF SERUM ALBUMIN: CPT | Performed by: PHYSICIAN ASSISTANT

## 2021-07-16 PROCEDURE — 83735 ASSAY OF MAGNESIUM: CPT | Performed by: PHYSICIAN ASSISTANT

## 2021-07-16 PROCEDURE — 36415 COLL VENOUS BLD VENIPUNCTURE: CPT

## 2021-07-16 PROCEDURE — 88346 IMFLUOR 1ST 1ANTB STAIN PX: CPT | Mod: TC | Performed by: INTERNAL MEDICINE

## 2021-07-16 PROCEDURE — 272N000001 HC OR GENERAL SUPPLY STERILE: Performed by: INTERNAL MEDICINE

## 2021-07-16 PROCEDURE — 250N000013 HC RX MED GY IP 250 OP 250 PS 637: Performed by: INTERNAL MEDICINE

## 2021-07-16 PROCEDURE — 93505 ENDOMYOCARDIAL BIOPSY: CPT | Performed by: INTERNAL MEDICINE

## 2021-07-16 RX ORDER — LIDOCAINE 40 MG/G
CREAM TOPICAL
Status: DISCONTINUED | OUTPATIENT
Start: 2021-07-16 | End: 2021-07-16 | Stop reason: HOSPADM

## 2021-07-16 RX ADMIN — Medication 50 MG: at 08:02

## 2021-07-16 RX ADMIN — BUPROPION HYDROCHLORIDE 75 MG: 75 TABLET, FILM COATED ORAL at 19:53

## 2021-07-16 RX ADMIN — GABAPENTIN 300 MG: 300 CAPSULE ORAL at 12:32

## 2021-07-16 RX ADMIN — MYCOPHENOLATE MOFETIL 500 MG: 250 CAPSULE ORAL at 08:40

## 2021-07-16 RX ADMIN — PANTOPRAZOLE SODIUM 40 MG: 40 TABLET, DELAYED RELEASE ORAL at 08:39

## 2021-07-16 RX ADMIN — GABAPENTIN 300 MG: 300 CAPSULE ORAL at 19:53

## 2021-07-16 RX ADMIN — UMECLIDINIUM 1 PUFF: 62.5 AEROSOL, POWDER ORAL at 08:37

## 2021-07-16 RX ADMIN — Medication 50 MG: at 21:55

## 2021-07-16 RX ADMIN — PREDNISONE 15 MG: 5 TABLET ORAL at 08:40

## 2021-07-16 RX ADMIN — NYSTATIN 1000000 UNITS: 500000 SUSPENSION ORAL at 08:40

## 2021-07-16 RX ADMIN — Medication 10 MG: at 21:55

## 2021-07-16 RX ADMIN — PANTOPRAZOLE SODIUM 40 MG: 40 TABLET, DELAYED RELEASE ORAL at 19:54

## 2021-07-16 RX ADMIN — VALGANCICLOVIR 450 MG: 450 TABLET, FILM COATED ORAL at 08:40

## 2021-07-16 RX ADMIN — CALCIUM CARBONATE 600 MG (1,500 MG)-VITAMIN D3 400 UNIT TABLET 1 TABLET: at 08:48

## 2021-07-16 RX ADMIN — POLYETHYLENE GLYCOL 3350 17 G: 17 POWDER, FOR SOLUTION ORAL at 19:53

## 2021-07-16 RX ADMIN — GABAPENTIN 300 MG: 300 CAPSULE ORAL at 08:39

## 2021-07-16 RX ADMIN — ASPIRIN 81 MG CHEWABLE TABLET 81 MG: 81 TABLET CHEWABLE at 08:39

## 2021-07-16 RX ADMIN — NYSTATIN 1000000 UNITS: 500000 SUSPENSION ORAL at 19:53

## 2021-07-16 RX ADMIN — TACROLIMUS 4 MG: 1 CAPSULE ORAL at 17:56

## 2021-07-16 RX ADMIN — TACROLIMUS 4 MG: 1 CAPSULE ORAL at 08:40

## 2021-07-16 RX ADMIN — NYSTATIN 1000000 UNITS: 500000 SUSPENSION ORAL at 17:56

## 2021-07-16 RX ADMIN — AMITRIPTYLINE HYDROCHLORIDE 25 MG: 25 TABLET, FILM COATED ORAL at 21:50

## 2021-07-16 RX ADMIN — ALLOPURINOL 200 MG: 100 TABLET ORAL at 08:39

## 2021-07-16 RX ADMIN — NYSTATIN 1000000 UNITS: 500000 SUSPENSION ORAL at 12:32

## 2021-07-16 RX ADMIN — INSULIN GLARGINE 24 UNITS: 100 INJECTION, SOLUTION SUBCUTANEOUS at 21:50

## 2021-07-16 RX ADMIN — CALCIUM CARBONATE 600 MG (1,500 MG)-VITAMIN D3 400 UNIT TABLET 1 TABLET: at 17:57

## 2021-07-16 RX ADMIN — FLUTICASONE FUROATE AND VILANTEROL TRIFENATATE 1 PUFF: 100; 25 POWDER RESPIRATORY (INHALATION) at 08:48

## 2021-07-16 RX ADMIN — MONTELUKAST 10 MG: 10 TABLET, FILM COATED ORAL at 21:50

## 2021-07-16 RX ADMIN — MYCOPHENOLATE MOFETIL 500 MG: 250 CAPSULE ORAL at 19:53

## 2021-07-16 RX ADMIN — BUPROPION HYDROCHLORIDE 75 MG: 75 TABLET, FILM COATED ORAL at 08:39

## 2021-07-16 ASSESSMENT — ACTIVITIES OF DAILY LIVING (ADL)
ADLS_ACUITY_SCORE: 15
ADLS_ACUITY_SCORE: 16
ADLS_ACUITY_SCORE: 15

## 2021-07-16 ASSESSMENT — MIFFLIN-ST. JEOR: SCORE: 1635.41

## 2021-07-16 NOTE — PRE-PROCEDURE
GENERAL PRE-PROCEDURE:   Procedure:  Right heart catheterization and endomyocardial biopsy  Date/Time:  7/16/2021 3:20 PM    Written consent obtained?: Yes    Risks and benefits: Risks, benefits and alternatives were discussed    DC Plan: Appropriate discharge home plan in place for patients who are going home after procedure   Consent given by:  Patient  Patient states understanding of procedure being performed: Yes    Patient's understanding of procedure matches consent: Yes    Procedure consent matches procedure scheduled: Yes    Appropriately NPO:  Yes

## 2021-07-16 NOTE — PLAN OF CARE
Temp: 98.7  F (37.1  C) Temp src: Oral BP: 107/73 Pulse: 96   Resp: 14 SpO2: 99 % O2 Device: None (Room air)       D: Admitted with weakness and multiple falls. Found to have R pleural effusion. Hx NICM s/p OHT (5/6/21) c/b R pleural air leak with prolonged CT, afib, COPD, CKD, DM, and anemia     I/A: Jim (he/him) is A&O x4. Tele in place, SR-T. VSS on RA. PIV in place, SL. No c/o pain or SOB this shift. Up with SBA. Slept well overnight     P: Continue to monitor and follow POC. Plan for possible thoracic fluid aspiration. Notify Cards 2 with changes

## 2021-07-16 NOTE — PLAN OF CARE
Jim Willingham is a 66 year old male with a history of NICM s/p OHT (5/6/21) postoperative course was complicated by right pleural air leak with prolonged chest tube, paroxysmal atrial fibrillation, COPD, CKDIII, DMII, anemia who presents to the hospital for generalized weakness, fatigue and multiple falls.  Pt doing well, ambulated behind a WC in the ayers. VSS, ST. R back dressing CDI. Good I&O. A&O, pleasant.   21-Nov-2019 04:41

## 2021-07-16 NOTE — PLAN OF CARE
D/He is tired per his usual. He is eating supper. He tells me he hopes to go back home next week and not rehab. He napped much of shift and watched TV part of evening.  A/slow progress  P/hopefully home next week, monitor for changes

## 2021-07-16 NOTE — PROGRESS NOTES
Calorie Count    Intake recorded for: 7/15/2021  Total Kcals: 979 Total Protein: 22g    Kcals from Hospital Food: 979   Protein: 22g    Kcals from Outside Food (average):0 Protein: 0g    # Meals Ordered from Kitchen: 1 meal ordered     # Meals Recorded: 1 recorded (100% 3 butter, 1 brown sugar, cream of wheat, pound cake, 50% 2 syrup, 2 pieces Divehi toast, coffee)    # Supplements Recorded: 100% 1 Glucerna

## 2021-07-16 NOTE — PROGRESS NOTES
[facility]   Cardiology II Service / Advanced Heart Failure  Daily Progress Note      Patient: Jim Willingham  MRN: [id number]  Admission Date: 7/8/2021  Hospital Day # 8    Assessment and Plan:    Jim Willingham is a 66 year old male with a history of NICM s/p OHT (5/6/21) postoperative course was complicated by right pleural air leak with prolonged chest tube, paroxysmal atrial fibrillation, COPD, CKDIII, DMII, anemia who presents to the hospital for generalized weakness, fatigue and multiple falls.    Today's Plan:  - monitor clinically, defer empiric abx  - RHC and bx today (early given c/f constriction on last RHC)  - follow-up aspergillus, will discuss indeterminate fungitell w ID  - repeat tacrolimus level tomorrow    # Status post OHT on 5/6/21, history of NICM   # c/b right pleural air leak w/prolonged CT, paroxysmal a fib  # Chronic immunosuppression  * TTE 6/29/21 with normal graft function, LVEF of 60-65%  * RHC 7/5/21: RA 18, PA 45/30/35, PCW 28, Kee CO/CI 5.4/2.7 at 195#    Graft Function:   --Volume: euvolemic, Cr improving, RHC today  --BP: at goal  --HR: 90s-100s per baseline    Immunosuppression:   --Prednisone 15 mg daily, then per taper   --Cellcept 1000 mg BID (decreased for infection)  --Tacrolimus 4 mg bid (goal decreased to 8-10 d/t renal function and infection), trough 8 yesterday, repeat tomorrow    Serostatus: CMV: D+/R+, EBV: D+/R+, Toxo D-/R-    Prophylaxis:   --CAV: aspirin 81 mg and HOLD rosuvastatin 10 mg daily for elevated LFTs  --Thrush: Nystatin   --PCP: d/c'ed bactrim given renal dysfunction, s/p pentamidine 7/12, due q28 days  --GI: Protonix   --Osteoporosis: calcium/vitamin D   --CMV: D+/R+, Valcyte 450 mg increased to daily given improved Cr, through August per protocol    # Moderate exudative pleural effusion  # Post op R pleural air leak s/p prolonged chest tube  # Recent sepsis d/t CAP, s/p cefepime -> levaquin course ended 7/4  No pulmonary symptoms, denies  cough or increased sputum production. Has remained afebrile, WBC SNL, and lack of other symptoms. However, given immunocompromised status, moderate pleural effusion is concerning. Diagnostic thoracetnesis on 7/10 with exudate, non-purulant. Gram stain negative. TG not suggestive of chylothroax. Aerobic, fungal, anaerobic pleural cx from 7/10 and 7/11 negative. Acid Fast and Mycobacterial stains and culture from 7/11 negative.     Other:  - Thoracics following for CT management, removed 7/14  - deferred antibiotics   - faily CXR  - f/u chest CT 7/14 given recent pna to r/o fungal pna, still with cavitation   - transplant ID reconsulted, repeat fungitell and aspergillus 7/1   - fungitell indeterminate, no need to treat per ID   - aspergillus in process  - repeat chest CT 7/23, if worse, needs bronch per transplant ID     # Pericardial effusion c/f constriction  # Anterior mediastinal fluid tracking inferiorly subxiphoid c.f abscess  Effusion noted on TTE from 7/12, chest CT done 7/13 for above reasons showed anterior mediastinal fluid tracking inferiorly subxiphoid. ?post surgical sterilde fluid versus abscess. Cannot do contrast CT due to renal dysfunction. Afebrile, normal WBC. CRP 4.2, reassuring.   - transplant ID recommends fluid aspiration prior to abx  - CVTS consulted, prefer to monitor clinically for now given aspiration would involve a pericardial window  - consider chest CT with contrast if renal function continues to improve/stable  - early biopsy with RHC today to reassess hemodynamics    # Generalized weakness, improved  # Mechanical falls  # Severe malnutrition in the setting of chronic illness   # Hx Sylvester En Y  p/w generalized fatigue after discharge 7/5/21 after treatment for CAP and sepsis. TSH WNL. With no other symptoms, weakness is likely 2/2 hypervolemia, deconditioning, possible malnutrition. Albumin 2.2.  - PT/OT following, recs for home  - Nutrition following, on calorie counts (missed  calories in counts)  - discussed appetite stimulants, patient prefers to monitor as appetite is improving     # Elevated liver enzymes 2/2 ?passed gallstone  AST and ALT trending up; 435 and 285 respectively. Asymptomatic. No new medications except Anakinra. No symptoms, improved without intervention.   - hepatology consulted   - abd US w hepatomegaly with steatosis   - thought possibly 2/2 passed gallstone or shock liver though we have low suspicion for the latter  - holding statin and APAP  - monitor daily for now    # MCP pseudogout  Xray of L hand with chondrocalcinosis and capsular calcification at the second and third MCP joints. Synovial fluid analysis negative for gram stain (+ WBC, but no organisms, pseudogout crystals). Culture +staph epi, thought to be a contaminent given radha in broth only on second day of monitoring as well as pseudogout crystals also present in the fluid and improvement on anakinra.   - improved on anakira 100 mg EOD  x 3 doses   - decreased allopurinol for renal function from 300 to 200 mg    # ?Esphageal dysmotility  Noted incidentally on CT chest 7/13.   - consider esophagram if symptomatic    # WALTER on CKD Stage III, resolved  Cr worsening this admission. Likely multifactorial fluid status and medication induced. Cr improving w holding Bumex and lower taco goal.   - stopped bactrim  - renal dosing   - decreased tacrolimus goal as above     # DM II  - PTA insulin regimen   - may consult Endocrine     # Depression  - PTA Wellbutrin 75 BID  - PTA Amitriptyline 25 at bedtime     # COPD   - PTA Monetlukast, Trelegy Ellipta, Albuterol    Diet: regular diet  DVT Prophylaxis: ambulatory  Russell Catheter: Not present  Code Status: Full    Kiesha Wilder DNP, NP-C  Advanced Heart Failure/Cardiology 2 Service  Pager     ================================================================    Subjective/24-Hr Events:   Last 24 hr care team notes reviewed. No changes today. Remains afebrile,  "no cough or shortness of breath. Therapies are going well. Denies orthopnea, PND, chest pain.     ROS:  4 point ROS including respiratory, CV, GI and  (other than that noted in the HPI) is negative.     Medications: Reviewed in EPIC.     Physical Exam:   /79   Pulse 100   Temp 97.1  F (36.2  C) (Oral)   Resp 18   Ht 1.727 m (5' 8\")   Wt 87.1 kg (192 lb)   SpO2 99%   BMI 29.19 kg/m      GENERAL: Appears comfortable, in no distress.  HEENT: Eye symmetrical, no discharge or icterus bilaterally. Mucous membranes moist and without lesions.  NECK: Supple, JVD difficult to appreciate.    CV: Tachycardic per baseline, +S1S2, no murmur, rub, or gallop.   RESPIRATORY: Respirations regular, even, and unlabored. Lungs CTA throughout, no crackles.    GI: Soft and non distended with normoactive bowel sounds present in all quadrants. No tenderness, rebound, guarding.   EXTREMITIES: No peripheral edema. 2+ bilateral pedal pulses. All extremities are warm and well perfused  NEUROLOGIC: Alert and interacting appropriately. No focal deficits.   MUSCULOSKELETAL: No joint swelling or tenderness.   SKIN: No jaundice. No rashes or lesions.     Labs:  CMP  Recent Labs   Lab 07/16/21  0749 07/16/21  0614 07/15/21  2153 07/15/21  0557 07/14/21  0554 07/13/21  2053 07/13/21  0545 07/13/21  0545 07/11/21  1643 07/11/21  0710 07/10/21  2031 07/10/21  1741 07/10/21  0624   NA  --  137  --  137 135  --   --  134  --  132*   < > 132* 135   POTASSIUM  --  4.6  --  5.0 4.7  --   --  4.8  --  5.1   < > 5.6* 4.6   CHLORIDE  --  110*  --  108 105  --   --  105  --  104   < > 103 105   CO2  --  23  --  23 25  --   --  25  --  23   < > 24 23   ANIONGAP  --  4  --  6 5  --   --  4  --  6   < > 5 6   * 53* 213* 170* 153*  --    < > 206*   < > 125*   < > 258* 77   BUN  --  34*  --  41* 45*  --   --  49*  --  49*   < > 44* 46*   CR  --  1.41*  --  1.73* 2.02* 2.29*  --  2.10*  --  2.75*   < > 2.48* 2.40*   GFRESTIMATED  --  52*  --  41* " 34* 29*  --  32*  --  23*   < > 26* 27*   GFRESTBLACK  --   --   --   --   --   --   --   --   --  27*  --  31* 32*   QAMAR  --  8.1*  --  7.8* 8.1*  --   --  7.6*  --  7.9*   < > 8.1* 7.8*   MAG  --  1.9  --  2.0 1.9  --   --  2.0   < > 1.8  --   --  1.8   PROTTOTAL  --  5.6*  --  5.2* 5.0*  --   --  5.1*   < >  --   --   --  5.3*   ALBUMIN  --  2.4*  --  2.3* 2.2*  --   --  2.2*   < >  --   --   --   --    BILITOTAL  --  0.3  --  0.3 0.2  --   --  0.9   < >  --   --   --   --    ALKPHOS  --  582*  --  458* 432*  --   --  502*   < >  --   --   --   --    AST  --  254*  --  112* 105*  --   --  435*   < >  --   --   --   --    ALT  --  256*  --  173* 184*  --   --  285*   < >  --   --   --   --     < > = values in this interval not displayed.       CBC  Recent Labs   Lab 07/16/21  0614 07/15/21  0557 07/14/21  0554 07/13/21  0545   WBC 6.7 5.6 6.0 5.7   RBC 3.14* 2.85* 2.89* 2.81*   HGB 8.8* 8.1* 8.1* 8.0*   HCT 30.2* 26.6* 27.2* 26.4*   MCV 96 93 94 94   MCH 28.0 28.4 28.0 28.5   MCHC 29.1* 30.5* 29.8* 30.3*   RDW 16.2* 15.9* 15.8* 15.7*    344 339 313       INR  Recent Labs   Lab 07/16/21  0614 07/15/21  0557 07/14/21  0554 07/10/21  0929   INR 1.07 1.09 1.20* 1.06       Time/Communication  I personally spent a total of 30 minutes. Of that >15 minutes was counseling/coordination of patient's care. Plan of care discussed with patient. See my note above for details.    Patient discussed with Dr. Ferreira.

## 2021-07-16 NOTE — PROGRESS NOTES
Transplant Infectious Disease Progress Note     Patient:  Jim Willingham   Date of birth 1955, Medical record number [id number]  Date of Visit:  07/16/2021  Date of Admission: 7/8/2021  Consult Requester:Nicola Seth MD          Assessment and Plan:   Recommendations:  1. Continue to monitor off of empiric abx  2. Aspergillus galactomannan pending - please page SOT ID if positive  3. Repeat CT Chest around 7/23 (10 days after last CT)  - If worsening, please call SOT ID and would also recommend bronchoscopy  4. Repeat Fungitell at time of CT   5. Continue Valcyte and inhaled pentamidine ppx (last pentamidine 7/12/21)  6. Check EBV monthly (next check around 8/10/21)  7. ID will sign off at this time, please don't hesitate to contact the Solid Organ Transplant ID team should further questions arise.      Assessment/Discussion:  Jim Willingham is a 66 year old male with history significant for nonischemic cardiomyopathy now s/p heart transplant on 5/6/21 immunosuppressed on tacrolimus, mycophenolate, and prednisone) with course complicated by right pleural effusion and paroxysmal atrial fibrillation; additional history of COPD, type II DM, CKD stage III, chronic anemia, and gout. He was recently hospitalized at [facility] (6/28-7/5/21) with fever presumed due to right lower lobe pneumonia and right pleural exudative effusion s/p broad spectrum antibiotics and thoracentesis. Readmitted to [facility] on 7/8/21 with persistent fatigue, generalized weakness, left second finger MCP acute joint swelling, re-accumulation of right exudative pleural effesion.    #Right lower lobe cavitary lesion  #Left upper lobe 3mm nodules x2  Initially with RLL patchy opacity 6/28/21and exudative effusion. Was treated with cefepime (6/27-7/3), Azithromycin (6/29-7/1), then narrowed levofloxacin through 7/5/21. Initial work up included aspergillus galactomannan, blasto serum ag, fungal ab panel, histo urine ag, and BD glucan, all of which were  unremarkable. CT repeated on 7/13, revealing RLL cavitary lesion and KALI nodules x3. Currently without fevers, leukocytosis, or respiratory symptoms. Previously had cough, which has resolved, and feels shortness of breath is improved. Unclear if seeing progression or resolution of this process. Fungal infection such as aspergillus remains in ddx. BD glucan indeterminate at 67, reassuring. Aspergillus galactomannan pending- please call ID if positive. Recommend repeat CT chest and BD glucan about 10 days after last CT.    #Mediastinal fluid collection  CT (7/13) noted fluid in anterior mediastinum tracking inferiorly subxiphoid. Unfortunately cannot use contrast 2/2 renal function. Cardiothoracic surgery and cardiology having ongoing discussion regarding utility of draining as inflammatory markers remain low and would require a pericardial window if drainage were pursued.     #Right pleural effusion, exudative  Cultures from 6/29 and 7/10 remain no growth to date. Both gram stains without organisms though noted WBCs. Was treated with empiric broad spectrum abx from 6/27-7/5. Now with re-accumulation s/p chest tube placement. No fevers, leukocytosis, or symptoms of pneumonia this admission. Remains exudative per Light's criteria. Feel that this is appears more consistent with non-infectious process rather than a bacterial empyema. Cultures repeated with chest tube placement on 7/11. Would not give another course of empiric abx at this time.     #Left second finger MCP joint pseudogout:  Pseudogout based on micro exam + for CPPD crystals. Late growth of S.epidermidis on synovial fluid culture, broth only at about 40 hours. Had been on Bactrim for PJP ppx, no fevers, leukocytosis, and procalcitonin negative. Now continues to improve. Most c/w contaminant.    #EBV viremia, low-grade  EBV 3509 with log 3.5 (6/7/21), undetected 6/28/21, then 1618 with log 3.2. Follow monthly EBV.      Previous ID Issues:  - CoNS bacteremia  "pre-transplant (10/2020) while he had LVAD in place       Other ID issues:  - QTc interval:  472msec (7/12/21)  - Bacterial prophylaxis:  not indicated  - Pneumocystis prophylaxis:  inhaled pentamidine   - Viral serostatus: CMV +, EBV +, HSV-1+, VZV +, Toxo - (donor result not available)  - Viral prophylaxis:  Valcyte   - Fungal prophylaxis:  not indicated  - Immunization status:  Starting at three months post-transplant (early 8/21), should begin to receive pneumococcal, Shingrix, HBV, and Covid-19 vaccine series.  - Gamma globulin status:  838 pre-transplant (12/21/20)  - Isolation status: Good hand hygiene       Yadira Young PA-C  Infectious Disease  Pager # [phone]  07/16/2021         Interim History and Events:     No changes from overnight. Breathing feels same as yesterday, has been comfortable on RA. Mild occipital headache without photophobia, neck stiffness, or fever. No other pain. No nausea, vomiting, diarrhea.          HPI:   Transplants:  5/6/2021 (Heart), Postoperative day:  71     Per Transplant ID Consult 7/11/21: \"Mr. Willingham is a 66 year old gentleman immunosuppressed (tacrolimus, mycophenolate, prednisone, now also anakinra) s/p a 5/6/21 heart transplant for nonischemic cardiomyopathy with a post-transplant course complicated by a right pleural air leak requiring a prolonged chest tube and paroxysmal atrial fibrillation.  He also has a history of COPD, type 2 diabetes mellitus, stage three chronic kidney disease, chronic anemia, and gout.  He was most recently previously hospitalized at [facility] from 6/28 - 7/5/21 with febrile sepsis due to right lower lobe healthcare-associated pneumonia, although no positive respiratory microbiology was apparently obtained (results are difficult to be certain about today after the [employer] laboratory system change last night).  One out of two blood cultures grew a presumably-contaminant Staph epidermidis.  In addition, he had an exudative right pleural effusion which was " drained of 270 ml by thoracentesis on 6/29/21 with cultures negative.  The pneumonia and suspected empyema was treated empirically with cefepime (6/28 - 7/3/21), levofloxacin (7/3 - 7/4/21 to complete a five day course), azithromycin (6/29 - 7/1/21), and vancomycin (6/29 - 7/1/21, for the Staph epidermidis blood culture).  He defervesced quickly and has been afebrile since 6/30/21.     He was now re-admitted three days later to the [facility] Cardiology 2 service on 7/8/21 PM with persistent ambulatory generalized weakness and fatigue that had not improved since his 7/5/21 discharge.  He fell about three times during those three days and complained of intermittent orthostatic dizziness as well as a weak appetite and inanition.  In addition, two days prior to admission, he began to develop left second finger MCP swelling that worsened on the day of re-admission with erythema and marked pain to movement and palpation, similar to his past gout flares in that joint.  He lacks other arthralgias or swelling of other joints.  Upon admission, he lacked fever, chills, EENT symptoms, resting dyspnea, cough, chest pain, nausea / emesis, abdominal pain, diarrhea, or additional complaints upon admission.  Since then, he has remained afebrile (T max 98.7 degrees F) and has had a steadily normal peripheral WBC in the 5.6 - 8.3 range, with ahigh CRP (87 - 98) and ESR (72 on 7/8/21) but a negative admission procalcitonin (0.18 on 7/8/21).  So far, the only antimicrobial he has received during this hospitalization is prophylactic TMP-SMX.  A left hand x-ray on 7/8/21 showed chondrocalcinosis and capsular calcification of two left (second and third) MCP joints.  Arthrocentesis was performed on the left second MCP joint by Orthopedics Surgery Consult on 7/8/21 with synovial fluid yielding CPPD crystals and a negative Gram stain (for organisms) with many PMNs, consistent with pseudogout. Ankirna was started on 7/9/21 and the joint is somewhat  "improved but still now swollen and painful.  Yesterday, 7/10/21, the 7/8/21 left second MCP synovial aspirated fluid culture belatedly isolated a Staphylococcus epidermidis (susceptibilities pending) at about 42 hours of incubation.  Transplant ID was consulted regarding whether this needs to be treated.     In addition, an admission chest x-ray on 7/8/21 and a repeat chest x-ray on 7/10/21 showed re-accumulated right pleural effusion (after it had previously been drained on 6/29/21.  A diagnostic thoracentesis performed on 7/10/21 once again yielded an exudate (, pleural total protein 2.7, glucose 102) with 384 WBC (52% neutrophils, 28% L, 20% M).  The pleural Gram stain showed no organisms.  Cultures are reportedly no growth to date.  A repeat therapeutic right thoracentesis with placement of a drainage tube is planned for today and additional cultures will be sent.  Beyond these two issues, Mr. Willinhgam is overall slowly improving and says his strength and energy are slightly better than upon admission.  He is eating partial meals.  His pain control is adequate. He has received diuresis and his breathing is stable on room air.  He lacks additional new complaints over the past couple of days.\"       ROS:  Focused 5 point review of systems was completed, pertinent positives and negatives are outlined above             Physical Examination:  Temp: 97.1  F (36.2  C) Temp src: Oral BP: 106/79 Pulse: 100   Resp: 18 SpO2: 99 % O2 Device: None (Room air)      Vitals:    07/12/21 0135 07/13/21 1031 07/14/21 0614 07/15/21 0550   Weight: 86.8 kg (191 lb 6.4 oz) 86.2 kg (190 lb) 86.7 kg (191 lb 1.6 oz) 86.1 kg (189 lb 14.4 oz)    07/16/21 0600   Weight: 87.1 kg (192 lb)       Constitutional: Awake, alert, and oriented. Pleasant and cooperative with exam.  HEENT: NC/AT, anicteric sclera, conjunctiva non-injected, OP with MMM  Respiratory: No increased work of breathing, CTAB, no crackles or wheezing.  Cardiovascular: RRR, " no murmur noted. No peripheral edema.  GI: soft, nondistended.  Skin: Warm, dry, well-perfused.   Extremities: Warm, no mottling or edema. Left 2nd digit MCP joint mildly swollen- continuing to gradually improve, able to make a fist and fully extend finger.  Vascular access:  PIV on RUE CDI, non-tender, no surrounding erythema.    Medications:    allopurinol  200 mg Oral Daily     amitriptyline  25 mg Oral At Bedtime     artificial tears  1 inch Both Eyes At Bedtime     aspirin  81 mg Oral Daily     buPROPion  75 mg Oral BID     calcium carbonate 600 mg-vitamin D 400 units  1 tablet Oral BID w/meals     fluticasone-vilanterol  1 puff Inhalation Daily     gabapentin  300 mg Oral QAM     gabapentin  300 mg Oral BID     insulin glargine  24 Units Subcutaneous At Bedtime     montelukast  10 mg Oral At Bedtime     mycophenolate  500 mg Oral BID     nystatin  1,000,000 Units Swish & Swallow 4x Daily     pantoprazole  40 mg Oral BID     polyethylene glycol  17 g Oral BID     predniSONE  15 mg Oral QAM     [Held by provider] rosuvastatin  10 mg Oral Daily     sodium chloride (PF)  3 mL Intracatheter Q8H     tacrolimus  4 mg Oral QAM     tacrolimus  4 mg Oral QPM     umeclidinium  1 puff Inhalation Daily     valGANciclovir  450 mg Oral Daily       Infusions/Drips:    - MEDICATION INSTRUCTIONS -         Laboratory Data:   No results found for: ACD4    Inflammatory Markers    Recent Labs   Lab Test 07/16/21  0614 07/09/21  0632 07/08/21  1746 07/08/21  1533 06/28/21  2208 10/27/20  1250 10/20/20  1305 10/13/20  1320   SED  --   --  72*  --   --  10 8 10   CRP 4.2 98.0*  --  87.0* 27.0* 3.5 11.0* 12.0*       Metabolic Studies       Recent Labs   Lab Test 07/16/21  0749 07/16/21  0614 07/15/21  2153 07/15/21  0557 07/14/21  1222 07/14/21  0554 07/13/21  2118 07/13/21  2053 07/13/21  0545 07/12/21  2119 07/12/21  2119 07/12/21  1115 07/01/21  0644 06/30/21  0339 06/25/21  1133 06/24/21  0905   NA  --  137  --  137  --  135  --    --  134  --  131* 134   < > 130*  --  134   POTASSIUM  --  4.6  --  5.0  --  4.7  --   --  4.8  --  5.2 4.9   < > 4.8  --  4.3   CHLORIDE  --  110*  --  108  --  105  --   --  105  --  102 104   < > 101  --  102   CO2  --  23  --  23  --  25  --   --  25  --  21 21   < > 25  --  23   ANIONGAP  --  4  --  6  --  5  --   --  4  --  8 9   < > 4  --  9   BUN  --  34*  --  41*  --  45*  --   --  49*  --  51* 49*   < > 47*  --  41*   CR  --  1.41*  --  1.73*  --  2.02*  --  2.29* 2.10*  --  2.26* 2.44*   < > 2.24*  --  2.23*   GFRESTIMATED  --  52*  --  41*  --  34*  --  29* 32*  --  30* 27*   < > 30*  --  30*   * 53* 213* 170* 177* 153*  --   --  206*   < > 316* 181*   < > 157*  --  320*   QAMAR  --  8.1*  --  7.8*  --  8.1*  --   --  7.6*  --  8.0* 8.0*   < > 8.3*  --  8.0*   PHOS  --   --   --   --   --   --   --   --   --   --   --   --   --  2.8  --  3.5   MAG  --  1.9  --  2.0  --  1.9   < >  --  2.0  --   --  1.9   < > 2.2   < > 1.8    < > = values in this interval not displayed.     Recent Labs   Lab Test 07/08/21  1533 06/30/21 0339 06/28/21 2208 05/26/21 2324   LACT 1.0 0.7 1.4 1.6     Recent Labs   Lab Test 06/21/21  0933 06/07/21  0807   CKT 31 49     Recent Labs   Lab Test 05/04/21  2313 01/07/21  0719 11/05/20  0907   A1C 5.1 6.2* 5.7*       Hepatic Studies    Recent Labs   Lab Test 07/16/21  0614 07/15/21  0557 07/14/21  0554 07/13/21 2053 07/13/21  0545   BILITOTAL 0.3 0.3 0.2  --  0.9   ALKPHOS 582* 458* 432*  --  502*   ALBUMIN 2.4* 2.3* 2.2*  --  2.2*   * 112* 105*  --  435*   * 173* 184*  --  285*   LDH  --   --   --  252*  --        Pancreatitis testing    Recent Labs   Lab Test 05/26/21  1340 05/04/21  2313 11/05/20  0907 08/05/20  0335 07/06/18  0856 06/08/17  0944   AMYLASE 49 93  --   --   --   --    LIPASE 25*  --   --  168  --   --    TRIG  --   --  91  --  123 124       Hematology Studies      Recent Labs   Lab Test 07/16/21  0614 07/15/21  0557 07/14/21  0554  07/13/21  0545 07/12/21  2119   WBC 6.7 5.6 6.0 5.7 4.8   HGB 8.8* 8.1* 8.1* 8.0* 9.5*   HCT 30.2* 26.6* 27.2* 26.4* 31.6*    344 339 313 330     Recent Labs   Lab Test 07/08/21  1746 07/08/21  1533 07/07/21  0858 07/05/21  0642 07/04/21  0721   ANEU 7.7 Canceled, Test credited 3.5 2.6 2.6   ALYM 0.3* Canceled, Test credited 1.3 0.8 0.4*   VIJAY 0.1 Canceled, Test credited 0.5 0.5 0.4   AEOS 0.0 Canceled, Test credited 0.1 0.0 0.0       Arterial Blood Gas Testing    Recent Labs   Lab Test 06/28/21  2247 05/09/21  0956 05/09/21  0902 05/07/21  1900 05/07/21  1700 05/07/21  1420   PH  --   --   --  7.40 7.38 7.39   PCO2  --   --   --  38 41 40   PO2  --   --   --  101 102 130*   HCO3  --   --   --  23 24 24   O2PER 21.0 21  21 REPORT AMENDED: 2LNC Canceled, Test credited  2L 40        Urine Studies     Recent Labs   Lab Test 07/09/21  0640 06/30/21  0640 05/25/21  1230   URINEPH 5.5 5.0 5.5   NITRITE Negative Negative Negative   LEUKEST Negative Negative Negative   WBCU 3 4 4       Vancomycin Levels     Recent Labs   Lab Test 05/08/21 2004 05/07/21  1701 10/27/20  1250 10/20/20  1305   VANCOMYCIN 18.5 16.0 19.3 16.5       Tobramycin levels     No lab results found.    Gentamicin levels    No lab results found.    CSF testing   No lab results found.      Microbiology:  Culture Micro   Date Value Ref Range Status   07/09/2021 No growth  Final   07/08/2021 (A)  Preliminary    On day 2, isolated in broth only:  Staphylococcus epidermidis     07/08/2021   Preliminary    Critical Value/Significant Value, preliminary result only, called to and read back by  Neli Boswell RN 1527 7/10/21 AM      07/08/2021 Culture negative monitoring continues  Preliminary   07/08/2021 No growth  Final   07/08/2021 No growth  Final   06/30/2021 No growth  Final   06/30/2021 No growth  Final   06/29/2021 No growth  Final   06/29/2021 No anaerobes isolated  Final   06/29/2021 Culture negative after 2 weeks  Preliminary   06/29/2021    Preliminary    Culture received and in progress.  Positive AFB results are called as soon as detected.    Final report to follow in 7 to 8 weeks.     06/29/2021   Preliminary    Assayed at [facility] [employer] [address] [place] [id number] [phone]   06/29/2021 No growth after 17 days  Preliminary   06/28/2021 No growth  Final   06/28/2021 (A)  Final    Cultured on the 2nd day of incubation:  Staphylococcus epidermidis     06/28/2021   Final    Critical Value/Significant Value, preliminary result only, called to and read back by  Lisa Rush RN 6/30/21 @ 0427 TF     06/28/2021   Final    (Note)  POSITIVE for STAPHYLOCOCCUS EPIDERMIDIS and POSITIVE for the mecA  gene (resistant to methicillin) by Cangrade nucleic acid  test. Final identification and antimicrobial susceptibility testing  will be verified by standard methods.    Specimen tested with Compact Imagingigene multiplex, gram-positive blood culture  nucleic acid test for the following targets: Staph aureus, Staph  epidermidis, Staph lugdunensis, other Staph species, Enterococcus  faecalis, Enterococcus faecium, Streptococcus species, S. agalactiae,  S. anginosus grp., S. pneumoniae, S. pyogenes, Listeria sp., mecA  (methicillin resistance) and Lucía/B (vancomycin resistance).    Critical Value/Significant Value called to and read back by Darcie Saeed RN at 0701 6.30.21. amd     05/26/2021 No growth  Final   05/26/2021 No growth  Final   05/26/2021 No acid fast bacilli isolated after 6 weeks  Final   05/25/2021 Heavy growth  Normal jaxon    Final   05/17/2021 No growth  Final   05/17/2021 No growth  Final   05/15/2021 No growth  Final   11/04/2020 No growth  Final   09/23/2020 No growth  Final   09/22/2020 No growth  Final   09/21/2020 No growth  Final   09/20/2020 No growth  Final   09/19/2020 No growth  Final   09/18/2020 (A)  Final    Cultured on the 2nd day of incubation:  Staphylococcus hominis  Susceptibility testing done on previous  specimen     09/18/2020   Final    Critical Value/Significant Value, preliminary result only, called to and read back by  Tamy Leventhal RN 1724 9/20/20 AM     09/18/2020 No growth  Final   09/17/2020 (A)  Final    Cultured on the 1st day of incubation:  Staphylococcus hominis     09/17/2020   Final    Critical Value/Significant Value, preliminary result only, called to and read back by  John Howard RN @1414 09/18/2020 hdh/lm     09/17/2020   Final    (Note)  POSITIVE for Staphylococci other than S.aureus, S.epidermidis and  S.lugdunensis, by TagLabsigene multiplex nucleic acid test.  Coagulase-negative staphylococci are the most common venipuncture or  collection associated skin CONTAMINANTS grown in blood cultures.  Final identification and antimicrobial susceptibility testing will be  verified by standard methods.    Specimen tested with Verigene multiplex, gram-positive blood culture  nucleic acid test for the following targets: Staph aureus, Staph  epidermidis, Staph lugdunensis, other Staph species, Enterococcus  faecalis, Enterococcus faecium, Streptococcus species, S. agalactiae,  S. anginosus grp., S. pneumoniae, S. pyogenes, Listeria sp., mecA  (methicillin resistance) and Lucía/B (vancomycin resistance).    Critical Value/Significant Value called to and read back by Le Carmona RN. @1712. 9.18.20. BS.      09/17/2020 No growth  Final   01/21/2020 No growth  Final   01/21/2020 (A)  Final    Heavy growth  Haemophilus influenzae  Beta lactamase negative  Beta-lactamase negative Haemophilus influenzae are usually susceptible to ampicillin,   amoxacillin/clavulanic acid, levofloxacin, and 3rd generation cephalosporins, such as   ceftriaxone.     01/20/2020 No growth  Final   01/20/2020 No growth  Final   01/16/2020 (A)  Final    10,000 to 50,000 colonies/mL  Coagulase negative Staphylococcus     01/15/2020 No growth  Final   01/15/2020 (A)  Final    Cultured on the 1st day of incubation:  Staphylococcus  epidermidis     01/15/2020   Final    Critical Value/Significant Value, preliminary result only, called to and read back by  Alphonse Cuba RN @ 1920. 1/16/20. AV     01/15/2020   Final    (Note)  POSITIVE for STAPHYLOCOCCUS EPIDERMIDIS and NEGATIVE for the mecA  gene (not resistant to methicillin) by Verigene nucleic acid test.  The mecA gene was not detected. Final identification and  antimicrobial susceptibility testing will be verified by standard  methods.    Specimen tested with Verigene multiplex, gram-positive blood culture  nucleic acid test for the following targets: Staph aureus, Staph  epidermidis, Staph lugdunensis, other Staph species, Enterococcus  faecalis, Enterococcus faecium, Streptococcus species, S. agalactiae,  S. anginosus grp., S. pneumoniae, S. pyogenes, Listeria sp., mecA  (methicillin resistance) and Lucía/B (vancomycin resistance).    Critical Value/Significant Value called to and read back by Dr. Leos, @2214 01/16/20..     04/13/2018 No growth  Final   04/13/2018 No growth  Final   05/24/2017 No growth  Final   05/23/2017 Moderate growth Normal jaxon  Final       Last check of C difficile  No results found for: CDBPCT    Imaging:  CT Chest w/o Contrast (7/13/21)  IMPRESSION:   1. Right lower lobe pneumonia as well as cavitation of prior right  lower lobe medial consolidation. Likely atypical organisms. Interval  increased density of a 3 mm nodule in the left upper lobe since  6/29/2021 suggestive for additional nidus of infection. Recommend  continued short-term interval imaging follow-up.  2. Small right hydropneumothorax with stable positioning of posterior  right basilar chest tube. Fluid component is significantly decreased  when compared to prior exam 6/29/2021.  3. Significant debris within the esophagus which may be seen in the  setting of esophageal dysmotility. Follow-up esophagram may be  considered for further evaluation.  4. Mediastinal adenopathy, likely  reactive.  5. Heart transplant changes, with fluid in the anterior mediastinum  tracking inferiorly subxiphoid. A contrast enhanced CT could be  helpful if concern for abscess, or FNA could be considered.    CXR (7/13/21)  Impression:   1. Small bilateral pleural effusions with bibasilar streakiness,  likely atelectasis.  2. Linear opacity overlying the right midlung, likely atelectasis.    CT Chest/Abd/Pelvis w/o contrast (6/28/21)                                                                 IMPRESSION:   1. A small patchy opacity in the central aspect of the right lower lobe, new since 05/26/2021. This most likely represents pneumonia. Several additional patchy opacities that had been present within the lungs on the relatively recent comparison study   have resolved.  2. Moderate-sized right pleural effusion, increased in size since comparison study. A very small left pleural effusion that had been present on the comparison study has resolved.  3. No acute abnormality identified in the abdomen or pelvis.     ECHO:  7/12/21 Limited  Interpretation Summary  Limited Echo to assess for pericardial effusion.  Global and regional left ventricular function is normal with an EF of 60-65%.  Right ventricular function, chamber size, wall motion, and thickness are  normal.  Small loculated pericardial effusion with organized material anterior to right  ventricle measuring 1.4 cm in maximal diameter. No evidence of tamponade  physiology.  The inferior vena cava is normal.     This study was compared with the study from 6/29/2021. Small loculated  pericardial effusion is now present.

## 2021-07-16 NOTE — PROGRESS NOTES
HX:66 year old male with a history of NICM s/p OHT (5/6/21) postoperative course was complicated by right pleural air leak with prolonged chest tube, paroxysmal atrial fibrillation, COPD, CKDIII, DMII, anemia who presents to the hospital for generalized weakness, fatigue and multiple falls    Cardiac:  VS:VSS, MAP 92  IV:PIV-SL  Tubes:NA  Neuro:A/Ox4, flat affect  Resp:Denied shortness of breath  GI/:No BM this shift, declined medication. No urine documented but patient stated that he voided.   Nutrition:Good PO for breakfast, likes Glucerna, NPO after noon for RHC/bx  Endo:Glucose 53 with labs this AM. Symptomatic and asked for OJ, ate breakfast. No glucose checked until after eaten, . No novalog ordered, on lantus at bedtime only.  Skin:intact except for CT site on back.  Activity:stated he was up at bedsid for voiding, no urine documented. Stated he was up in his room with therapy, no ambulation in halls.   Pain:C/O headache, treated with ultram with some relief.   Social:No visitors, cell phone at bedside.  Plan:Monitor for symptoms from chest fluid collection. RHC/bx this afternoon. Continue current cares and notify providers with questions or concerns. Probable discharge Monday.

## 2021-07-16 NOTE — PROGRESS NOTES
CLINICAL NUTRITION SERVICES - REASSESSMENT NOTE     Nutrition Prescription    RECOMMENDATIONS FOR MDs/PROVIDERS TO ORDER:  1. Monitor BG with recent hypoglycemia (BG was 53 on 7/16/21 at 06:14).   2. Order the following (Sylvester-en-y Gastric Bypass):      Multivitamin/minerals: Adult dose 2 times daily to help ensure micronutrient needs are met.       Vitamin B12: Sublingual form (pt prefers) of at least 500 mcg daily. Pt received B12 on 5/13/21. Had been ordered to receive vitamin B12 Q 30 days. Pt's vitamin B12 was 734 on 5/13/21.   3. Consider an appetite stimulant.      Malnutrition Status:    Severe malnutrition in the context of acute illness/injury on chronic illness    Recommendations already ordered by Registered Dietitian (RD):  Modified oral supplements    Future/Additional Recommendations:  1. Continue regular diet, as ordered. Encourage intake of oral supplements and any food from home, as able.   2. Continue fluid restriction as per team.   3. Check vitamin B12 and folic acid labs. Rec supplement if folic acid is low. Rec supplement vitamin B12 as noted above.  4. Continue calcium/vitamin D twice daily since pt is on prednisone.   5. Monitor BG control. DM 2. Hgb A1c of 6.2 on 1/7/21, 5.1 on 5/4/21. H/o hypoglycemia previously and today (7/16).     6. Monitor K+ trends (K+ was 4.6 on 7/16) and potential need to adjust diet order.   7. Consider checking zinc status and supplement for short course if low. Note, zinc may be low due to possible infection.   8. If TFs are needed, rec place feeding tube (FT) via radiology due to RNY hx and initiate TFs, order Osmolite 1.5 (concentrated, maintenance TF formula), and order Prosource TF modular (1 pkt daily). Initiate TFs at 15 mL/hr, advancing rate by 10 mL Q 8 hrs (or per provider discretion, pending hemodynamic stability) to goal rate of 60 mL/hr. Osmolite 1.5 Pete at goal of 60 mL/hr (1440ml/day) will provide: 2160+ kcals, 90+ g PRO, 1097 ml free H20, 293 g  "CHO, and 0 g fiber daily. Each packet of ProSource TF modular provides 40 kcals and 11 g protein.                  If begin tube feeds:               - Flush FT with 30 mL water Q 4 hrs for patency. Rec provider adjust free water flushes as needed, pending fluid status.              - Ensure K+/Mg++/Phos labs are ordered daily until TFs advance to goal infusion to evaluate for sx of refeeding with nutrition received. If lytes trend low, aggressively replace lytes.                  - If not already ordered, order a multivitamin/mineral (certavite 15 mL/day via FT) to help ensure micronutrient needs being met with suspected hypermetabolic demands and potential interruptions to TF infusions.              - Monitor TF and possible need to adjust nutrition support regimen if necessary, pending medical course and nutrition status.                  - Monitor need to check a metabolic cart study.                - Send a nutrition consult for \"Registered Dietitian to Order TF per Medical Nutrition Therapy Guidelines\" if desire RD to order TFs.               - If K+ trends high and if needs Novasource Renal TF (lower in K+), then rec a goal rate of 45 mL/hr.      EVALUATION OF THE PROGRESS TOWARD GOALS   Diet: Regular since 7/14  Intake: Tolerating diet. Per % intake flowsheets, pt consuming 50% with a fair appetite 7/10, 25-75% with a poor to fair appetite 7/12, 75% with a fair appetite 7/14, and 100% on 7/15. Per discussion with pt, he is eating more now than at time of admit. Pt states he has more of an appetite due to feeling better. He has been more physically active. States the taste of food is starting to come back a little. Having one to two Glucernas daily. Pt report of consuming one-third to 50% of his usual oral intake. Has not eaten any outside food so far this admission      Kcal counts:   7/9        Total Kcals: 1289      Total Protein: 28g-4 meals ordered, 2 recorded, 10)% of 2 supplements   7/10      Total " Kcals: 327        Total Protein: 12g-2 meals ordered, 2 recorded, no supplements recorded    7/11      Total Kcals: 433        Total Protein: 19g-2 meals ordered, 1 recorded, no supplements recorded    *3 day average PO intake = 683 kcal (37% minimum energy needs) and 20 g protein (22% minimum protein needs)   7/13       Total Kcals: 0           Total Protein: 0g. # Meals Recorded: 1 meal ordered from kitchen, no intake recorded. # Supplements Recorded: no intake recorded.    7/14       Total Kcals: 375       Total Protein: 2g. # Meals Ordered from Kitchen: 2 meals. # Meals Recorded: 1 meal (First - 100% mandarin oranges, 75% spinach salad w/ croutons & balsamic vinaigrette) # Supplements Recorded: 0   7/15       Total Kcals: 979       Total Protein: 22g. # Meals Ordered from Kitchen: 1 meal ordered. # Meals Recorded: 1 recorded (100% 3 butter, 1 brown sugar, cream of wheat, pound cake, 50% 2 syrup, 2 pieces Luxembourgish toast, coffee). # Supplements Recorded: 100% 1 Glucerna. Per discussion with pt, he consumed a boxed salad and chicken noodle soup from the  nutrition area for supper and he also consumed a Glucerna and Special K bar.   * Pt consumed a two-day average of 677 kcals and 12 g protein daily. However, not all data recorded.      NEW FINDINGS   GI: Formed and brown stools. Last stool on 7/15. Having zero to one stool per day.   Wt Hx: 118.4 kg (2/13/20), 114.2 kg (6/24/20), 106.2 kg (8/7/20), 103.9 kg (11/13/20), 94.8 kg (1/12/21), 99.5 kg (2/8/21 at time of admit during which he received a Tx), 100.2 kg (2/15/21), 98.2 kg (2/22), 100 kg (3/2), 99.7 kg (3/11), 99.8 kg (4/7/21), 99.9 kg (4/9), 100.4 kg (5/26), 88.8 kg (6/7/21), 89.9 kg (6/28), 88.7 kg (7/8, admit), 86 kg (7/9), 87.1 kg (7/16) - Weight fluctuations, likely due to fluid shifts and post-op status but actual wt loss as well. Pt has lost 13% of body wt over the last approximate three months.    MALNUTRITION  % Intake: </= 50% for >/= 5 days  (severe)  % Weight Loss: > 7.5% in 3 months (severe)  Subcutaneous Fat Loss: None observed  Muscle Loss: None observed  Fluid Accumulation/Edema: Not meeting this criteria.     Malnutrition Diagnosis: Severe malnutrition in the context of acute illness/injury on chronic illness    Previous Goals   Patient to consume % of nutritionally adequate meal trays TID, or the equivalent with supplements/snacks.  Evaluation: Not meeting, but improving.     Previous Nutrition Diagnosis  Inadequate oral intake related to lack of appetite and early satiety as evidenced by pt report of consuming one meal per day, eating less for the last approximate 20 days, and has lost 13% of body wt over the last three months.  Evaluation: Unresolved. Updated below.     CURRENT NUTRITION DIAGNOSIS  Inadequate oral intake related to decreased appetite and taste changes as evidenced by pt consuming 25-50% of meals, at times, and pt report of consuming one-third to 50% of his usual oral intake.       INTERVENTIONS  Implementation  Medical food supplement therapy: Modified vanilla Glucerna to chocolate Glucerna as per discussion with pt.   Nutrition education for nutrition relationship to health/disease: Discussed hypoglycemia this morning with pt. Rec eating adequately for supper and later in the evening (per pt, may not have eaten enough food and received HS Lantus) to potentially help this. Encouraged oral intake and intake of oral supplements to help improve kcal/protein intake. Per discussion with pt, would prefer not to have a feeding tube.      Goals  Patient to consume % of nutritionally adequate meal trays TID, or the equivalent with supplements/snacks.    Monitoring/Evaluation  Progress toward goals will be monitored and evaluated per protocol.     Nutrition will continue to follow.      Sana Costello, MS, RD, LD, [facility]   6C Pgr: [phone]

## 2021-07-17 ENCOUNTER — APPOINTMENT (OUTPATIENT)
Dept: PHYSICAL THERAPY | Facility: CLINIC | Age: 66
DRG: 186 | End: 2021-07-17
Payer: COMMERCIAL

## 2021-07-17 ENCOUNTER — APPOINTMENT (OUTPATIENT)
Dept: GENERAL RADIOLOGY | Facility: CLINIC | Age: 66
DRG: 186 | End: 2021-07-17
Payer: COMMERCIAL

## 2021-07-17 LAB
ALBUMIN SERPL-MCNC: 2.4 G/DL (ref 3.4–5)
ALP SERPL-CCNC: 573 U/L (ref 40–150)
ALT SERPL W P-5'-P-CCNC: 273 U/L (ref 0–70)
ANION GAP SERPL CALCULATED.3IONS-SCNC: 6 MMOL/L (ref 3–14)
AST SERPL W P-5'-P-CCNC: 162 U/L (ref 0–45)
BILIRUB DIRECT SERPL-MCNC: 0.1 MG/DL (ref 0–0.2)
BILIRUB SERPL-MCNC: 0.4 MG/DL (ref 0.2–1.3)
BUN SERPL-MCNC: 32 MG/DL (ref 7–30)
CALCIUM SERPL-MCNC: 7.7 MG/DL (ref 8.5–10.1)
CHLORIDE BLD-SCNC: 108 MMOL/L (ref 94–109)
CO2 SERPL-SCNC: 23 MMOL/L (ref 20–32)
CREAT SERPL-MCNC: 1.29 MG/DL (ref 0.66–1.25)
ERYTHROCYTE [DISTWIDTH] IN BLOOD BY AUTOMATED COUNT: 16.4 % (ref 10–15)
GFR SERPL CREATININE-BSD FRML MDRD: 58 ML/MIN/1.73M2
GLUCOSE BLD-MCNC: 86 MG/DL (ref 70–99)
GLUCOSE BLDC GLUCOMTR-MCNC: 170 MG/DL (ref 70–99)
GLUCOSE BLDC GLUCOMTR-MCNC: 177 MG/DL (ref 70–99)
GLUCOSE BLDC GLUCOMTR-MCNC: 185 MG/DL (ref 70–99)
GLUCOSE BLDC GLUCOMTR-MCNC: 86 MG/DL (ref 70–99)
GLUCOSE BLDC GLUCOMTR-MCNC: 90 MG/DL (ref 70–99)
HCT VFR BLD AUTO: 28.9 % (ref 40–53)
HGB BLD-MCNC: 8.7 G/DL (ref 13.3–17.7)
INR PPP: 1.12 (ref 0.85–1.15)
MAGNESIUM SERPL-MCNC: 1.9 MG/DL (ref 1.6–2.3)
MCH RBC QN AUTO: 28.4 PG (ref 26.5–33)
MCHC RBC AUTO-ENTMCNC: 30.1 G/DL (ref 31.5–36.5)
MCV RBC AUTO: 94 FL (ref 78–100)
PLATELET # BLD AUTO: 329 10E3/UL (ref 150–450)
POTASSIUM BLD-SCNC: 4.6 MMOL/L (ref 3.4–5.3)
PROT SERPL-MCNC: 5.4 G/DL (ref 6.8–8.8)
RBC # BLD AUTO: 3.06 10E6/UL (ref 4.4–5.9)
SARS-COV-2 RNA RESP QL NAA+PROBE: NEGATIVE
SODIUM SERPL-SCNC: 137 MMOL/L (ref 133–144)
TACROLIMUS BLD-MCNC: 8.9 UG/L (ref 5–15)
TME LAST DOSE: NORMAL H
TME LAST DOSE: NORMAL H
WBC # BLD AUTO: 6.3 10E3/UL (ref 4–11)

## 2021-07-17 PROCEDURE — 80197 ASSAY OF TACROLIMUS: CPT | Performed by: NURSE PRACTITIONER

## 2021-07-17 PROCEDURE — 97116 GAIT TRAINING THERAPY: CPT | Mod: GP

## 2021-07-17 PROCEDURE — 214N000001 HC R&B CCU UMMC

## 2021-07-17 PROCEDURE — 80053 COMPREHEN METABOLIC PANEL: CPT | Performed by: PHYSICIAN ASSISTANT

## 2021-07-17 PROCEDURE — 82248 BILIRUBIN DIRECT: CPT | Performed by: PHYSICIAN ASSISTANT

## 2021-07-17 PROCEDURE — 250N000013 HC RX MED GY IP 250 OP 250 PS 637: Performed by: STUDENT IN AN ORGANIZED HEALTH CARE EDUCATION/TRAINING PROGRAM

## 2021-07-17 PROCEDURE — U0003 INFECTIOUS AGENT DETECTION BY NUCLEIC ACID (DNA OR RNA); SEVERE ACUTE RESPIRATORY SYNDROME CORONAVIRUS 2 (SARS-COV-2) (CORONAVIRUS DISEASE [COVID-19]), AMPLIFIED PROBE TECHNIQUE, MAKING USE OF HIGH THROUGHPUT TECHNOLOGIES AS DESCRIBED BY CMS-2020-01-R: HCPCS | Performed by: NURSE PRACTITIONER

## 2021-07-17 PROCEDURE — 97530 THERAPEUTIC ACTIVITIES: CPT | Mod: GP

## 2021-07-17 PROCEDURE — 250N000012 HC RX MED GY IP 250 OP 636 PS 637: Performed by: NURSE PRACTITIONER

## 2021-07-17 PROCEDURE — 82310 ASSAY OF CALCIUM: CPT | Performed by: PHYSICIAN ASSISTANT

## 2021-07-17 PROCEDURE — 85027 COMPLETE CBC AUTOMATED: CPT | Performed by: PHYSICIAN ASSISTANT

## 2021-07-17 PROCEDURE — 71045 X-RAY EXAM CHEST 1 VIEW: CPT

## 2021-07-17 PROCEDURE — 250N000012 HC RX MED GY IP 250 OP 636 PS 637

## 2021-07-17 PROCEDURE — 71045 X-RAY EXAM CHEST 1 VIEW: CPT | Mod: 26 | Performed by: RADIOLOGY

## 2021-07-17 PROCEDURE — 36415 COLL VENOUS BLD VENIPUNCTURE: CPT | Performed by: NURSE PRACTITIONER

## 2021-07-17 PROCEDURE — 250N000013 HC RX MED GY IP 250 OP 250 PS 637: Performed by: PHYSICIAN ASSISTANT

## 2021-07-17 PROCEDURE — 250N000013 HC RX MED GY IP 250 OP 250 PS 637: Performed by: NURSE PRACTITIONER

## 2021-07-17 PROCEDURE — 250N000013 HC RX MED GY IP 250 OP 250 PS 637

## 2021-07-17 PROCEDURE — 83735 ASSAY OF MAGNESIUM: CPT | Performed by: PHYSICIAN ASSISTANT

## 2021-07-17 PROCEDURE — 250N000013 HC RX MED GY IP 250 OP 250 PS 637: Performed by: INTERNAL MEDICINE

## 2021-07-17 PROCEDURE — 85610 PROTHROMBIN TIME: CPT | Performed by: NURSE PRACTITIONER

## 2021-07-17 PROCEDURE — 250N000012 HC RX MED GY IP 250 OP 636 PS 637: Performed by: PHYSICIAN ASSISTANT

## 2021-07-17 PROCEDURE — 99232 SBSQ HOSP IP/OBS MODERATE 35: CPT | Mod: 24 | Performed by: NURSE PRACTITIONER

## 2021-07-17 RX ORDER — NICOTINE POLACRILEX 4 MG
15-30 LOZENGE BUCCAL
Status: DISCONTINUED | OUTPATIENT
Start: 2021-07-17 | End: 2021-07-17

## 2021-07-17 RX ORDER — DEXTROSE MONOHYDRATE 25 G/50ML
25-50 INJECTION, SOLUTION INTRAVENOUS
Status: DISCONTINUED | OUTPATIENT
Start: 2021-07-17 | End: 2021-07-17

## 2021-07-17 RX ADMIN — INSULIN ASPART 1 UNITS: 100 INJECTION, SOLUTION INTRAVENOUS; SUBCUTANEOUS at 17:49

## 2021-07-17 RX ADMIN — NYSTATIN 1000000 UNITS: 500000 SUSPENSION ORAL at 17:49

## 2021-07-17 RX ADMIN — NYSTATIN 1000000 UNITS: 500000 SUSPENSION ORAL at 12:30

## 2021-07-17 RX ADMIN — Medication 25 MG: at 08:44

## 2021-07-17 RX ADMIN — VALGANCICLOVIR 450 MG: 450 TABLET, FILM COATED ORAL at 08:18

## 2021-07-17 RX ADMIN — GABAPENTIN 300 MG: 300 CAPSULE ORAL at 12:30

## 2021-07-17 RX ADMIN — PANTOPRAZOLE SODIUM 40 MG: 40 TABLET, DELAYED RELEASE ORAL at 08:17

## 2021-07-17 RX ADMIN — Medication 10 MG: at 22:01

## 2021-07-17 RX ADMIN — FLUTICASONE FUROATE AND VILANTEROL TRIFENATATE 1 PUFF: 100; 25 POWDER RESPIRATORY (INHALATION) at 08:17

## 2021-07-17 RX ADMIN — MONTELUKAST 10 MG: 10 TABLET, FILM COATED ORAL at 22:01

## 2021-07-17 RX ADMIN — AMITRIPTYLINE HYDROCHLORIDE 25 MG: 25 TABLET, FILM COATED ORAL at 22:01

## 2021-07-17 RX ADMIN — ALLOPURINOL 200 MG: 100 TABLET ORAL at 08:17

## 2021-07-17 RX ADMIN — NYSTATIN 1000000 UNITS: 500000 SUSPENSION ORAL at 08:18

## 2021-07-17 RX ADMIN — CALCIUM CARBONATE 600 MG (1,500 MG)-VITAMIN D3 400 UNIT TABLET 1 TABLET: at 17:50

## 2021-07-17 RX ADMIN — UMECLIDINIUM 1 PUFF: 62.5 AEROSOL, POWDER ORAL at 08:17

## 2021-07-17 RX ADMIN — BUPROPION HYDROCHLORIDE 75 MG: 75 TABLET, FILM COATED ORAL at 08:17

## 2021-07-17 RX ADMIN — MYCOPHENOLATE MOFETIL 500 MG: 250 CAPSULE ORAL at 08:18

## 2021-07-17 RX ADMIN — PANTOPRAZOLE SODIUM 40 MG: 40 TABLET, DELAYED RELEASE ORAL at 20:27

## 2021-07-17 RX ADMIN — GABAPENTIN 300 MG: 300 CAPSULE ORAL at 08:17

## 2021-07-17 RX ADMIN — MYCOPHENOLATE MOFETIL 500 MG: 250 CAPSULE ORAL at 20:28

## 2021-07-17 RX ADMIN — TACROLIMUS 4 MG: 1 CAPSULE ORAL at 17:50

## 2021-07-17 RX ADMIN — TACROLIMUS 4 MG: 1 CAPSULE ORAL at 08:18

## 2021-07-17 RX ADMIN — Medication 50 MG: at 22:01

## 2021-07-17 RX ADMIN — POLYETHYLENE GLYCOL 3350 17 G: 17 POWDER, FOR SOLUTION ORAL at 08:18

## 2021-07-17 RX ADMIN — PREDNISONE 15 MG: 5 TABLET ORAL at 08:18

## 2021-07-17 RX ADMIN — Medication 25 MG: at 08:38

## 2021-07-17 RX ADMIN — NYSTATIN 1000000 UNITS: 500000 SUSPENSION ORAL at 20:27

## 2021-07-17 RX ADMIN — ASPIRIN 81 MG CHEWABLE TABLET 81 MG: 81 TABLET CHEWABLE at 08:18

## 2021-07-17 RX ADMIN — GABAPENTIN 300 MG: 300 CAPSULE ORAL at 20:28

## 2021-07-17 RX ADMIN — CALCIUM CARBONATE 600 MG (1,500 MG)-VITAMIN D3 400 UNIT TABLET 1 TABLET: at 08:17

## 2021-07-17 RX ADMIN — BUPROPION HYDROCHLORIDE 75 MG: 75 TABLET, FILM COATED ORAL at 20:27

## 2021-07-17 ASSESSMENT — ACTIVITIES OF DAILY LIVING (ADL)
ADLS_ACUITY_SCORE: 15

## 2021-07-17 NOTE — PLAN OF CARE
D/He is napping, but easily arousable. Drank supplements for supper and some saved food for bedtime.  P/monitor for changes

## 2021-07-17 NOTE — PLAN OF CARE
Pt admitted 7/8 with weakness, fatigue, and recent falls found to have pneumonia and right pleural effusion. PMH includes NICM s/p OSH 5/6/21 c/b persistent R pleural airleak requiring prolonged chest tube, pAFib, COPD, CKD3, DM2, and anemia.     Neuro: A&O x 4. Calls appropriately. Afebrile. Slept well overnight.   Cardiac: SR/ST 90's-110's. 's. Denies dizziness, chest pain, or palpitations.  Respiratory: Sating well on RA. RESENDIZ. LS clear.  GI/: Good UOP. Last BM 7/15.   Endocrine: Lantus given at HS. Checked overnight BG   Diet/Appetite: Regular diet. 2L FR.   Skin: Dressing on back where chest tube was pulled CDI . Bandaid on right neck at [facility] site CDI.  LDA: R PIV SL  Activity: AO1 with walker.   Pain: Denies     Plan: Continue to monitor and alert team with any changes or concerns.

## 2021-07-17 NOTE — PROGRESS NOTES
[facility]   Cardiology II Service / Advanced Heart Failure  Daily Progress Note  Date of Service: 7/17/2021      Patient: Jim Willingham  MRN: [id number]  Admission Date: 7/8/2021  Hospital Day # 9    Assessment and Plan: Jim Willingham is a 66 year old male with a history of NICM s/p OHT (5/6/21) postoperative course was complicated by right pleural air leak with prolonged chest tube, paroxysmal atrial fibrillation, COPD, CKD Stage III, DM Type II, and anemia. He underwent recent hospitalization for CAP complicated by cavitary lesions and exudative effusion presenting to the hospital for generalized weakness, fatigue and multiple falls.    Moderate Right exudative effusion. RLL Cavitary lesion. Diagnostic thoracentesis 7/10 consistent with exudate, TG negative for chylothorax. Aerobic, fungal, anaerobic pleural cx from 7/10 and 7/11 negative. Acid Fast and Mycobacterial stains and culture from 7/11 negative. Repeat Chest CT 7/14 consistent with RLL cavitary lesion and KALI nodules x3. BD glucan indeterminate at 67.   - Aspergillus pending.   - Appreciate Transplant ID consult.   - Repeat CT 7/23.  - Chest X-ray unchanged, repeat in AM.     Mediastinal fluid collection. Pericardial effusion. Newly identified pericardial effusion 7/12/21 1.4 cm in diameter. Chest CT 7/13 consistent with anterior mediastinal fluid tracking.   - Appreciate Transplant ID consult. Unable to obtain specimen without surgical procedure.   - Appreciate CVTS consult. Monitor clinically at this time.   - Consider repeat echo in 1-2 weeks to reassess effusion.   - Cardiac biopsy pending with normal filling pressures and output.     S/p OHT. 5/6/21 complicated by right pleural air leak and prolonged CT, Afib, and CAP with cavitary lesion as above.   RHC 7/16 with mRA-7, mPCW-11, MICHELLE CO-6.41, and MICHELLE CI-3.19. Echo 7/12 with EF 60-65%, normal RV function, and pericardial effusion without evidence of tamponade, and normal IVC.    Immunosuppression: Prednisone 15 mg po daily, Cellcept 1000 mg po BID, and Tac 4 mg po BID. Tac level 8.9 (goal 8-10).   Serostatus: CMV: D+/R+, EBV: D+/R+, Toxo D-/R-  Prophylaxis: Nystatin, Pentamidine (7/12), and Valcyte.   - Continue ASA. Crestor held in setting of transaminitis.   - Cardiac biopsy pending.     Severe protein calorie malnutrition. History of Sylvester En Y.   - Appreciate Nutrition consult.   - Appetite improving.     Transaminitis with questionable Choledocholithiasis. Abdominal US consistent with hepatomegaly with steatosis, CBD 2 mm, and patent vasculature.   - Appreciate Hepatology consult.   - Tbili WNL. ALT improving with mild rise in AST. Continue to trend.   - Med review consistent with Allopurinol and Valcyte may contribute. However, present on admission.     Left MCP Pseudogout. Left Hand X-ray 7/8/21 with chondrocalcinosis and capsular calcification at the second and third MCP joints. Synovial fluid analysis negative for gram stain (+ WBC, but no organisms, pseudogout crystals). Culture +staph epi, thought to be a contaminent given growth in broth only on second day of monitoring as well as pseudogout crystals also present in the fluid and improvement on anakinra.   - Anakinra 100 mg subcutaneous times 3 doses.   - Allopurinol 200 mg po daily.     DM Type II, controlled.   - Decrease Lantus to 20 units in setting of AM hypoglycemia.   - Novolog low intensity sliding scale insulin.     Generalized weakness. Frequent falls.   - Appreciate PT/OT. Cleared for discharge to home.     Chronic Medical Conditions:   Depression. Continue Wellbutrin 75 BID  COPD. Continue Monetlukast, Trelegy Ellipta, Albuterol    FEN: Regular diet   PROPHY:  Ambulation. Protonix  LINES: PIV  DISPO: anticipate discharge to home in AM  CODE STATUS: Full Code   ================================================================    Interval History/ROS: He notes he is feeling much better. He denies fatigue, fever,  "chills, chest pain, palpitations, cough, nausea, vomiting, diarrhea, and LE edema. He is tolerating oral intake and ambulation.     Last 24 hr care team notes reviewed.   ROS:  4 point ROS including Respiratory, CV, GI and , other than that noted in the HPI, is negative.     Medications: Reviewed in EPIC.     Physical Exam:   /82 (BP Location: Left arm)   Pulse 100   Temp 98  F (36.7  C) (Oral)   Resp 16   Ht 1.727 m (5' 8\")   Wt 87.1 kg (192 lb)   SpO2 99%   BMI 29.19 kg/m    GENERAL: Appears alert and oriented times three.   HEENT: Eye symmetrical and free of discharge bilaterally. Mucous membranes moist and without lesions.  NECK: Supple and without lymphadenopathy. JVD at clavicular line.   CV: RRR, S1S2 present without murmur, rub, or gallop.   RESPIRATORY: Respirations regular, even, and unlabored. Lungs CTA throughout.   GI: Soft and non distended with normoactive bowel sounds present in all quadrants. No tenderness, rebound, guarding. No organomegaly.   EXTREMITIES: No peripheral edema. 2+ bilateral pedal pulses.   NEUROLOGIC: Alert and orientated x 3. CN II-XII grossly intact. No focal deficits.   MUSCULOSKELETAL: No joint swelling or tenderness.   SKIN: No jaundice. No rashes or lesions.     Data:  CMPRecent Labs   Lab 07/17/21  1241 07/17/21  0800 07/17/21  0626 07/17/21  0212 07/16/21  2023 07/16/21  0614 07/15/21  0557 07/14/21  0554 07/11/21  1643 07/11/21  0710 07/10/21  2031 07/10/21  1741   NA  --   --  137  --   --  137 137 135  --  132*   < > 132*   POTASSIUM  --   --  4.6  --   --  4.6 5.0 4.7  --  5.1   < > 5.6*   CHLORIDE  --   --  108  --   --  110* 108 105  --  104   < > 103   CO2  --   --  23  --   --  23 23 25  --  23   < > 24   ANIONGAP  --   --  6  --   --  4 6 5  --  6   < > 5   GLC 90 86 86 185*  --  53* 170* 153*   < > 125*   < > 258*   BUN  --   --  32*  --   --  34* 41* 45*  --  49*   < > 44*   CR  --   --  1.29*  --  1.39* 1.41* 1.73* 2.02*  --  2.75*   < > 2.48* "   GFRESTIMATED  --   --  58*  --  53* 52* 41* 34*  --  23*   < > 26*   GFRESTBLACK  --   --   --   --   --   --   --   --   --  27*  --  31*   QAMAR  --   --  7.7*  --   --  8.1* 7.8* 8.1*  --  7.9*   < > 8.1*   MAG  --   --  1.9  --   --  1.9 2.0 1.9   < > 1.8  --   --    PROTTOTAL  --   --  5.4*  --   --  5.6* 5.2* 5.0*   < >  --   --   --    ALBUMIN  --   --  2.4*  --   --  2.4* 2.3* 2.2*   < >  --   --   --    BILITOTAL  --   --  0.4  --   --  0.3 0.3 0.2   < >  --   --   --    ALKPHOS  --   --  573*  --   --  582* 458* 432*   < >  --   --   --    AST  --   --  162*  --   --  254* 112* 105*   < >  --   --   --    ALT  --   --  273*  --   --  256* 173* 184*   < >  --   --   --     < > = values in this interval not displayed.     CBC  Recent Labs   Lab 07/17/21 0626 07/16/21  1608 07/16/21  0614 07/15/21  0557 07/14/21  0554   WBC 6.3  --  6.7 5.6 6.0   RBC 3.06*  --  3.14* 2.85* 2.89*   HGB 8.7* 8.8* 8.8* 8.1* 8.1*   HCT 28.9*  --  30.2* 26.6* 27.2*   MCV 94  --  96 93 94   MCH 28.4  --  28.0 28.4 28.0   MCHC 30.1*  --  29.1* 30.5* 29.8*   RDW 16.4*  --  16.2* 15.9* 15.8*     --  367 344 339     INR  Recent Labs   Lab 07/17/21  0626 07/16/21  0614 07/15/21  0557 07/14/21  0554   INR 1.12 1.07 1.09 1.20*       Patient discussed with Dr. Mccarty.      Soraya Marshall [facility]  7/17/2021

## 2021-07-17 NOTE — PROGRESS NOTES
ANTICOAGULATION FOLLOW-UP CLINIC VISIT    Patient Name:  Jim Willingham  Date:  2019  Contact Type:  Telephone    SUBJECTIVE:     Patient Findings     Comments:   No changes or concerns.           OBJECTIVE    INR   Date Value Ref Range Status   2019 2.68 (H) 0.86 - 1.14 Final       ASSESSMENT / PLAN  INR assessment THER    Recheck INR In: 2 WEEKS    INR Location Clinic      Anticoagulation Summary  As of 2019    INR goal:   2.0-3.0   TTR:   79.6 % (1.8 y)   INR used for dosin.68 (2019)   Warfarin maintenance plan:   10 mg (5 mg x 2) every Mon, Fri; 7.5 mg (5 mg x 1.5) all other days   Full warfarin instructions:   10 mg every Mon, Fri; 7.5 mg all other days   Weekly warfarin total:   57.5 mg   Plan last modified:   Maru Alonso RN (2019)   Next INR check:   2019   Priority:   INR   Target end date:       Indications    LVAD (left ventricular assist device) present (H) [Z95.811]  Long-term (current) use of anticoagulants [Z79.01] [Z79.01]             Anticoagulation Episode Summary     INR check location:       Preferred lab:       Send INR reminders to:   [facility]    Comments:   HIPPA Forms mailed 17  Labs drawn either at [facility] or [facility]  LVAD placed 17   II  ASA 81 mg daily      Anticoagulation Care Providers     Provider Role Specialty Phone number    UlisesDelisa MD [facility] Cardiology [phone]            See the Encounter Report to view Anticoagulation Flowsheet and Dosing Calendar (Go to Encounters tab in chart review, and find the Anticoagulation Therapy Visit)  Spoke with patient.  Patient had LVAD placed on:   2017  Patient's current Aspirin dose: 81mg  LVAD Protocol followed: yes   Delisa Hillman RN                 
18-May-2021

## 2021-07-18 ENCOUNTER — APPOINTMENT (OUTPATIENT)
Dept: OCCUPATIONAL THERAPY | Facility: CLINIC | Age: 66
DRG: 186 | End: 2021-07-18
Payer: COMMERCIAL

## 2021-07-18 ENCOUNTER — APPOINTMENT (OUTPATIENT)
Dept: GENERAL RADIOLOGY | Facility: CLINIC | Age: 66
DRG: 186 | End: 2021-07-18
Payer: COMMERCIAL

## 2021-07-18 LAB
ALBUMIN SERPL-MCNC: 2.2 G/DL (ref 3.4–5)
ALP SERPL-CCNC: 577 U/L (ref 40–150)
ALT SERPL W P-5'-P-CCNC: 278 U/L (ref 0–70)
ANION GAP SERPL CALCULATED.3IONS-SCNC: 4 MMOL/L (ref 3–14)
AST SERPL W P-5'-P-CCNC: 211 U/L (ref 0–45)
BACTERIA PLR CULT: NORMAL
BILIRUB DIRECT SERPL-MCNC: <0.1 MG/DL (ref 0–0.2)
BILIRUB SERPL-MCNC: 0.3 MG/DL (ref 0.2–1.3)
BUN SERPL-MCNC: 28 MG/DL (ref 7–30)
CALCIUM SERPL-MCNC: 7.9 MG/DL (ref 8.5–10.1)
CHLORIDE BLD-SCNC: 108 MMOL/L (ref 94–109)
CO2 SERPL-SCNC: 24 MMOL/L (ref 20–32)
CREAT SERPL-MCNC: 1.24 MG/DL (ref 0.66–1.25)
ERYTHROCYTE [DISTWIDTH] IN BLOOD BY AUTOMATED COUNT: 16.6 % (ref 10–15)
GFR SERPL CREATININE-BSD FRML MDRD: 61 ML/MIN/1.73M2
GLUCOSE BLD-MCNC: 89 MG/DL (ref 70–99)
GLUCOSE BLDC GLUCOMTR-MCNC: 235 MG/DL (ref 70–99)
GLUCOSE BLDC GLUCOMTR-MCNC: 243 MG/DL (ref 70–99)
GLUCOSE BLDC GLUCOMTR-MCNC: 305 MG/DL (ref 70–99)
GLUCOSE BLDC GLUCOMTR-MCNC: 74 MG/DL (ref 70–99)
GLUCOSE BLDC GLUCOMTR-MCNC: 90 MG/DL (ref 70–99)
GLUCOSE BLDC GLUCOMTR-MCNC: 94 MG/DL (ref 70–99)
HCT VFR BLD AUTO: 27.2 % (ref 40–53)
HGB BLD-MCNC: 8.2 G/DL (ref 13.3–17.7)
INR PPP: 1.11 (ref 0.85–1.15)
MAGNESIUM SERPL-MCNC: 1.9 MG/DL (ref 1.6–2.3)
MCH RBC QN AUTO: 28.7 PG (ref 26.5–33)
MCHC RBC AUTO-ENTMCNC: 30.1 G/DL (ref 31.5–36.5)
MCV RBC AUTO: 95 FL (ref 78–100)
PLATELET # BLD AUTO: 285 10E3/UL (ref 150–450)
POTASSIUM BLD-SCNC: 4.7 MMOL/L (ref 3.4–5.3)
PROT SERPL-MCNC: 5 G/DL (ref 6.8–8.8)
RBC # BLD AUTO: 2.86 10E6/UL (ref 4.4–5.9)
SODIUM SERPL-SCNC: 136 MMOL/L (ref 133–144)
WBC # BLD AUTO: 4.9 10E3/UL (ref 4–11)

## 2021-07-18 PROCEDURE — 250N000012 HC RX MED GY IP 250 OP 636 PS 637: Performed by: PHYSICIAN ASSISTANT

## 2021-07-18 PROCEDURE — 250N000009 HC RX 250: Performed by: NURSE PRACTITIONER

## 2021-07-18 PROCEDURE — 250N000012 HC RX MED GY IP 250 OP 636 PS 637: Performed by: NURSE PRACTITIONER

## 2021-07-18 PROCEDURE — 83735 ASSAY OF MAGNESIUM: CPT | Performed by: PHYSICIAN ASSISTANT

## 2021-07-18 PROCEDURE — 86140 C-REACTIVE PROTEIN: CPT | Performed by: INTERNAL MEDICINE

## 2021-07-18 PROCEDURE — 80048 BASIC METABOLIC PNL TOTAL CA: CPT | Performed by: PHYSICIAN ASSISTANT

## 2021-07-18 PROCEDURE — 71045 X-RAY EXAM CHEST 1 VIEW: CPT | Mod: 26 | Performed by: RADIOLOGY

## 2021-07-18 PROCEDURE — 82040 ASSAY OF SERUM ALBUMIN: CPT | Performed by: PHYSICIAN ASSISTANT

## 2021-07-18 PROCEDURE — 250N000013 HC RX MED GY IP 250 OP 250 PS 637

## 2021-07-18 PROCEDURE — 250N000013 HC RX MED GY IP 250 OP 250 PS 637: Performed by: NURSE PRACTITIONER

## 2021-07-18 PROCEDURE — 250N000012 HC RX MED GY IP 250 OP 636 PS 637

## 2021-07-18 PROCEDURE — 71045 X-RAY EXAM CHEST 1 VIEW: CPT

## 2021-07-18 PROCEDURE — 250N000013 HC RX MED GY IP 250 OP 250 PS 637: Performed by: INTERNAL MEDICINE

## 2021-07-18 PROCEDURE — 36415 COLL VENOUS BLD VENIPUNCTURE: CPT | Performed by: NURSE PRACTITIONER

## 2021-07-18 PROCEDURE — 02BM3ZX EXCISION OF VENTRICULAR SEPTUM, PERCUTANEOUS APPROACH, DIAGNOSTIC: ICD-10-PCS | Performed by: INTERNAL MEDICINE

## 2021-07-18 PROCEDURE — 4A023N6 MEASUREMENT OF CARDIAC SAMPLING AND PRESSURE, RIGHT HEART, PERCUTANEOUS APPROACH: ICD-10-PCS | Performed by: INTERNAL MEDICINE

## 2021-07-18 PROCEDURE — 85610 PROTHROMBIN TIME: CPT | Performed by: NURSE PRACTITIONER

## 2021-07-18 PROCEDURE — 250N000013 HC RX MED GY IP 250 OP 250 PS 637: Performed by: STUDENT IN AN ORGANIZED HEALTH CARE EDUCATION/TRAINING PROGRAM

## 2021-07-18 PROCEDURE — 97530 THERAPEUTIC ACTIVITIES: CPT | Mod: GO

## 2021-07-18 PROCEDURE — 99232 SBSQ HOSP IP/OBS MODERATE 35: CPT | Mod: 24 | Performed by: NURSE PRACTITIONER

## 2021-07-18 PROCEDURE — 85027 COMPLETE CBC AUTOMATED: CPT | Performed by: PHYSICIAN ASSISTANT

## 2021-07-18 PROCEDURE — 250N000013 HC RX MED GY IP 250 OP 250 PS 637: Performed by: PHYSICIAN ASSISTANT

## 2021-07-18 PROCEDURE — 214N000001 HC R&B CCU UMMC

## 2021-07-18 RX ORDER — ALLOPURINOL 100 MG/1
200 TABLET ORAL DAILY
Status: DISCONTINUED | OUTPATIENT
Start: 2021-07-19 | End: 2021-07-19 | Stop reason: HOSPADM

## 2021-07-18 RX ORDER — ALLOPURINOL 100 MG/1
100 TABLET ORAL DAILY
Status: DISCONTINUED | OUTPATIENT
Start: 2021-07-19 | End: 2021-07-18

## 2021-07-18 RX ORDER — VALGANCICLOVIR 450 MG/1
900 TABLET, FILM COATED ORAL DAILY
Status: DISCONTINUED | OUTPATIENT
Start: 2021-07-19 | End: 2021-07-19 | Stop reason: HOSPADM

## 2021-07-18 RX ADMIN — MONTELUKAST 10 MG: 10 TABLET, FILM COATED ORAL at 22:03

## 2021-07-18 RX ADMIN — CALCIUM CARBONATE 600 MG (1,500 MG)-VITAMIN D3 400 UNIT TABLET 1 TABLET: at 17:09

## 2021-07-18 RX ADMIN — Medication 50 MG: at 20:11

## 2021-07-18 RX ADMIN — PREDNISONE 15 MG: 5 TABLET ORAL at 08:13

## 2021-07-18 RX ADMIN — UMECLIDINIUM 1 PUFF: 62.5 AEROSOL, POWDER ORAL at 08:15

## 2021-07-18 RX ADMIN — BUPROPION HYDROCHLORIDE 75 MG: 75 TABLET, FILM COATED ORAL at 08:13

## 2021-07-18 RX ADMIN — TACROLIMUS 4 MG: 1 CAPSULE ORAL at 08:13

## 2021-07-18 RX ADMIN — MYCOPHENOLATE MOFETIL 500 MG: 250 CAPSULE ORAL at 20:12

## 2021-07-18 RX ADMIN — ASPIRIN 81 MG CHEWABLE TABLET 81 MG: 81 TABLET CHEWABLE at 08:14

## 2021-07-18 RX ADMIN — GABAPENTIN 300 MG: 300 CAPSULE ORAL at 20:11

## 2021-07-18 RX ADMIN — VALGANCICLOVIR 450 MG: 450 TABLET, FILM COATED ORAL at 08:12

## 2021-07-18 RX ADMIN — NYSTATIN 1000000 UNITS: 500000 SUSPENSION ORAL at 20:12

## 2021-07-18 RX ADMIN — TACROLIMUS 4 MG: 1 CAPSULE ORAL at 17:09

## 2021-07-18 RX ADMIN — MYCOPHENOLATE MOFETIL 500 MG: 250 CAPSULE ORAL at 08:13

## 2021-07-18 RX ADMIN — ALLOPURINOL 200 MG: 100 TABLET ORAL at 08:14

## 2021-07-18 RX ADMIN — Medication 10 MG: at 22:02

## 2021-07-18 RX ADMIN — NYSTATIN 1000000 UNITS: 500000 SUSPENSION ORAL at 08:12

## 2021-07-18 RX ADMIN — GABAPENTIN 300 MG: 300 CAPSULE ORAL at 12:05

## 2021-07-18 RX ADMIN — INSULIN ASPART 1 UNITS: 100 INJECTION, SOLUTION INTRAVENOUS; SUBCUTANEOUS at 17:09

## 2021-07-18 RX ADMIN — PANTOPRAZOLE SODIUM 40 MG: 40 TABLET, DELAYED RELEASE ORAL at 20:12

## 2021-07-18 RX ADMIN — PANTOPRAZOLE SODIUM 40 MG: 40 TABLET, DELAYED RELEASE ORAL at 08:14

## 2021-07-18 RX ADMIN — GABAPENTIN 300 MG: 300 CAPSULE ORAL at 08:13

## 2021-07-18 RX ADMIN — NYSTATIN 1000000 UNITS: 500000 SUSPENSION ORAL at 15:44

## 2021-07-18 RX ADMIN — ANAKINRA 100 MG: 100 INJECTION, SOLUTION SUBCUTANEOUS at 16:42

## 2021-07-18 RX ADMIN — AMITRIPTYLINE HYDROCHLORIDE 25 MG: 25 TABLET, FILM COATED ORAL at 22:03

## 2021-07-18 RX ADMIN — CALCIUM CARBONATE 600 MG (1,500 MG)-VITAMIN D3 400 UNIT TABLET 1 TABLET: at 08:14

## 2021-07-18 RX ADMIN — Medication 50 MG: at 08:19

## 2021-07-18 RX ADMIN — NYSTATIN 1000000 UNITS: 500000 SUSPENSION ORAL at 12:04

## 2021-07-18 RX ADMIN — BUPROPION HYDROCHLORIDE 75 MG: 75 TABLET, FILM COATED ORAL at 20:12

## 2021-07-18 RX ADMIN — FLUTICASONE FUROATE AND VILANTEROL TRIFENATATE 1 PUFF: 100; 25 POWDER RESPIRATORY (INHALATION) at 08:15

## 2021-07-18 ASSESSMENT — ACTIVITIES OF DAILY LIVING (ADL)
ADLS_ACUITY_SCORE: 15

## 2021-07-18 ASSESSMENT — MIFFLIN-ST. JEOR: SCORE: 1658.99

## 2021-07-18 NOTE — PROGRESS NOTES
[facility]   Cardiology II Service / Advanced Heart Failure  Daily Progress Note  Date of Service: 7/18/2021      Patient: Jim Willingham  MRN: [id number]  Admission Date: 7/8/2021  Hospital Day # 10       Faculty Attestation  Duran Mccarty M.D.    I personally saw and examined this patient, reviewed recent laboratories and imaging studies, discussed the case with the housestaff, and conveyed plan to the patient.  I answered all questions from patient and/or family. I agree with the examination, assessment and plan outlined here.          Assessment and Plan: Jim Willingham is a 66 year old male with a history of NICM s/p OHT (5/6/21) postoperative course was complicated by right pleural air leak with prolonged chest tube, paroxysmal atrial fibrillation, COPD, CKD Stage III, DM Type II, and anemia. He underwent recent hospitalization for CAP complicated by cavitary lesions and exudative effusion presenting to the hospital for generalized weakness, fatigue and multiple falls.     Moderate Right exudative effusion. RLL Cavitary lesion. Diagnostic thoracentesis 7/10 consistent with exudate, TG negative for chylothorax. Aerobic, fungal, anaerobic pleural cx from 7/10 and 7/11 negative. Acid Fast and Mycobacterial stains and culture from 7/11 negative. Repeat Chest CT 7/14 consistent with RLL cavitary lesion and KALI nodules x3. BD glucan indeterminate at 67.   - Aspergillus negative.   - Appreciate Transplant ID consult.   - Repeat CT 7/23.  - Chest X-ray with decreased effusion, increased streaky opacities.     Mediastinal fluid collection. Pericardial effusion. Newly identified pericardial effusion 7/12/21 1.4 cm in diameter. Chest CT 7/13 consistent with anterior mediastinal fluid tracking.   - Appreciate Transplant ID consult. Unable to obtain specimen without surgical procedure.   - Appreciate CVTS consult. Monitor clinically at this time.   - Consider repeat echo in 1-2 weeks to reassess effusion.    - Cardiac biopsy pending with normal filling pressures and output.      S/p OHT. 5/6/21 complicated by right pleural air leak and prolonged CT, Afib, and CAP with cavitary lesion as above.   RHC 7/16 with mRA-7, mPCW-11, MICHELLE CO-6.41, and MICHELLE CI-3.19. Echo 7/12 with EF 60-65%, normal RV function, and pericardial effusion without evidence of tamponade, and normal IVC.   Immunosuppression: Prednisone 15 mg po daily, Cellcept 500 mg po BID, and Tac 4 mg po BID. Tac level 8.9 (goal 8-10). Repeat level in AM.   Serostatus: CMV: D+/R+, EBV: D+/R+, Toxo D-/R-  Prophylaxis: Nystatin, Pentamidine (7/12), and Valcyte.   - Continue ASA. Crestor held in setting of transaminitis.   - Cardiac biopsy pending.      Severe protein calorie malnutrition. History of Sylvester En Y.   - Appreciate Nutrition consult.   - Appetite improving.      Transaminitis with questionable Choledocholithiasis. Abdominal US consistent with hepatomegaly with steatosis, CBD 2 mm, and patent vasculature.   - Appreciate Hepatology consult.   - Tbili WNL. Persistent transaminitis.   - Case discussed with hepatology, continue current regimen and trend outpt. No current contraindication with Anakinra.      Left MCP Pseudogout, acute flare. Left Hand X-ray 7/8/21 with chondrocalcinosis and capsular calcification at the second and third MCP joints. Synovial fluid analysis negative for gram stain (+ WBC, but no organisms, pseudogout crystals). Culture +staph epi, thought to be a contaminent given growth in broth only on second day of monitoring as well as pseudogout crystals also present in the fluid and improvement on anakinra.   - Anakinra 100 mg today, script sent to pharmacy with out of pocket cost $450.   - Allopurinol 200 mg po daily.      DM Type II, controlled.   - Lantus decreased to 18 units daily.   - Novolog low intensity sliding scale insulin.      Generalized weakness. Frequent falls.   - Appreciate PT/OT. Cleared for discharge to home.      Chronic  "Medical Conditions:   Depression. Continue Wellbutrin 75 BID  COPD. Continue Monetlukast, Trelegy Ellipta, Albuterol     FEN: Regular diet   PROPHY:  Ambulation. Protonix  LINES: PIV  DISPO: anticipate discharge to home in AM pending stable LFT's and improvement in acute gout flare.   CODE STATUS: Full Code   ================================================================    Interval History/ROS: He complains of acute gout pain to left hand this AM. He notes persistent poor appetite, but feels it is improving. He denies fever, chills, chest pain, RESENDIZ, cough, nausea, vomiting, diarrhea, and LE edema. He is tolerating ambulation with therapy.     Last 24 hr care team notes reviewed.   ROS:  4 point ROS including Respiratory, CV, GI and , other than that noted in the HPI, is negative.     Medications: Reviewed in EPIC.     Physical Exam:   /80 (BP Location: Left arm)   Pulse 98   Temp 97.3  F (36.3  C) (Oral)   Resp 18   Ht 1.727 m (5' 8\")   Wt 89.4 kg (197 lb 3.2 oz)   SpO2 97%   BMI 29.98 kg/m    GENERAL: Appears alert and oriented times three.   HEENT: Eye symmetrical and free of discharge bilaterally. Mucous membranes moist and without lesions.  NECK: Supple and without lymphadenopathy. JVD at clavicular line  CV: RRR, S1S2 present without murmur, rub, or gallop.   RESPIRATORY: Respirations regular, even, and unlabored. Lungs CTA throughout.   GI: Soft and non distended with normoactive bowel sounds present in all quadrants. No tenderness, rebound, guarding. No organomegaly.   EXTREMITIES: No peripheral edema. 2+ bilateral pedal pulses.   NEUROLOGIC: Alert and orientated x 3. CN II-XII grossly intact. No focal deficits.   MUSCULOSKELETAL: No joint swelling or tenderness.   SKIN: No jaundice. No rashes or lesions.     Data:  CMPRecent Labs   Lab 07/18/21  0803 07/18/21  0552 07/18/21  0414 07/17/21  2200 07/17/21  0626 07/16/21  2023 07/16/21  0614 07/15/21  0557   NA  --  136  --   --  137  --  137 " 137   POTASSIUM  --  4.7  --   --  4.6  --  4.6 5.0   CHLORIDE  --  108  --   --  108  --  110* 108   CO2  --  24  --   --  23  --  23 23   ANIONGAP  --  4  --   --  6  --  4 6   GLC 74 89 90 170* 86  --  53* 170*   BUN  --  28  --   --  32*  --  34* 41*   CR  --  1.24  --   --  1.29* 1.39* 1.41* 1.73*   GFRESTIMATED  --  61  --   --  58* 53* 52* 41*   QAMAR  --  7.9*  --   --  7.7*  --  8.1* 7.8*   MAG  --  1.9  --   --  1.9  --  1.9 2.0   PROTTOTAL  --  5.0*  --   --  5.4*  --  5.6* 5.2*   ALBUMIN  --  2.2*  --   --  2.4*  --  2.4* 2.3*   BILITOTAL  --  0.3  --   --  0.4  --  0.3 0.3   ALKPHOS  --  577*  --   --  573*  --  582* 458*   AST  --  211*  --   --  162*  --  254* 112*   ALT  --  278*  --   --  273*  --  256* 173*     CBC  Recent Labs   Lab 07/18/21  0552 07/17/21  0626 07/16/21  1608 07/16/21  0614 07/15/21  0557   WBC 4.9 6.3  --  6.7 5.6   RBC 2.86* 3.06*  --  3.14* 2.85*   HGB 8.2* 8.7* 8.8* 8.8* 8.1*   HCT 27.2* 28.9*  --  30.2* 26.6*   MCV 95 94  --  96 93   MCH 28.7 28.4  --  28.0 28.4   MCHC 30.1* 30.1*  --  29.1* 30.5*   RDW 16.6* 16.4*  --  16.2* 15.9*    329  --  367 344     INR  Recent Labs   Lab 07/18/21  0552 07/17/21  0626 07/16/21  0614 07/15/21  0557   INR 1.11 1.12 1.07 1.09       Patient seen and discussed with Dr. Mccarty.      Soraya [facility]  7/18/2021      used

## 2021-07-18 NOTE — PROGRESS NOTES
HX:66 year old male with a history of NICM s/p OHT (5/6/21) postoperative course was complicated by right pleural air leak with prolonged chest tube, paroxysmal atrial fibrillation, COPD, CKDIII, DMII, anemia who presents to the hospital for generalized weakness, fatigue and multiple falls.     Cardiac:SR/ST   VS:VSS, MAPS 88,92  IV:PIV-SL  Tubes:NA  Neuro:A/Ox4, more interactive today  Resp:Denied shortness of breath  GI/:Voiding fair amount, states he had BM this AM but flushed it, declined medication.  Nutrition:Good PO for breakfast and lunch, Likes Glucerna, declined breakfast, ate most of lunch.  Endo:No need for mealtime insulin. Glucose values 79 and 94. Lantus decreased.   Skin:Intact except for CT site on back.  Activity:Up at bedside independently, ambulated in halls and to gym with therapy and walker.   Pain:C/O headache, treated with ultram with some relief. Gout pain-receiving allopurinol. To receive 1 dose Anakendra today.  Social:No visitors, on cell phone throughout shift.  Plan:Monitor for symptoms from chest fluid collection. Probable discontinue to home Monday pending labs.  Continue current cares and notify providers with questions or concerns.

## 2021-07-18 NOTE — PLAN OF CARE
"/81 (BP Location: Left arm)   Pulse 104   Temp 97.9  F (36.6  C) (Oral)   Resp 16   Ht 1.727 m (5' 8\")   Wt 89.4 kg (197 lb 3.2 oz)   SpO2 98%   BMI 29.98 kg/m      Shift: [id number]  Reason for Admission: generalized weakness, fatigue and multiple falls  VS: VSS on RA. ST  Neuros: A/Ox4, able to make needs known  GI/: No BMs reported this shift  Nutrition: Tolerating Regular diet. 2L FR  Drains/Lines: PIV SL  Activity: Pt up ad francisco in room  Pain/Nausea: Denies both  Labs: BG ACHS  Skin: Old CT site on back- CDI  Plan of care: Plan for possible discharge tomorrow. Will continue to monitor and follow POC.  "

## 2021-07-19 ENCOUNTER — DOCUMENTATION ONLY (OUTPATIENT)
Dept: CARDIOLOGY | Facility: CLINIC | Age: 66
End: 2021-07-19

## 2021-07-19 VITALS
HEIGHT: 68 IN | SYSTOLIC BLOOD PRESSURE: 107 MMHG | OXYGEN SATURATION: 99 % | BODY MASS INDEX: 29.89 KG/M2 | WEIGHT: 197.2 LBS | RESPIRATION RATE: 16 BRPM | TEMPERATURE: 98.1 F | HEART RATE: 100 BPM | DIASTOLIC BLOOD PRESSURE: 79 MMHG

## 2021-07-19 LAB
ALBUMIN SERPL-MCNC: 2.2 G/DL (ref 3.4–5)
ALP SERPL-CCNC: 537 U/L (ref 40–150)
ALT SERPL W P-5'-P-CCNC: 233 U/L (ref 0–70)
ANION GAP SERPL CALCULATED.3IONS-SCNC: 3 MMOL/L (ref 3–14)
AST SERPL W P-5'-P-CCNC: 118 U/L (ref 0–45)
BILIRUB DIRECT SERPL-MCNC: <0.1 MG/DL (ref 0–0.2)
BILIRUB SERPL-MCNC: 0.3 MG/DL (ref 0.2–1.3)
BUN SERPL-MCNC: 27 MG/DL (ref 7–30)
CALCIUM SERPL-MCNC: 7.9 MG/DL (ref 8.5–10.1)
CHLORIDE BLD-SCNC: 109 MMOL/L (ref 94–109)
CO2 SERPL-SCNC: 24 MMOL/L (ref 20–32)
CREAT SERPL-MCNC: 1.26 MG/DL (ref 0.66–1.25)
CRP SERPL-MCNC: 13 MG/L (ref 0–8)
ERYTHROCYTE [DISTWIDTH] IN BLOOD BY AUTOMATED COUNT: 16.6 % (ref 10–15)
GFR SERPL CREATININE-BSD FRML MDRD: 60 ML/MIN/1.73M2
GLUCOSE BLD-MCNC: 90 MG/DL (ref 70–99)
HCT VFR BLD AUTO: 28.2 % (ref 40–53)
HGB BLD-MCNC: 8.4 G/DL (ref 13.3–17.7)
INR PPP: 1.16 (ref 0.85–1.15)
MAGNESIUM SERPL-MCNC: 2 MG/DL (ref 1.6–2.3)
MCH RBC QN AUTO: 28.5 PG (ref 26.5–33)
MCHC RBC AUTO-ENTMCNC: 29.8 G/DL (ref 31.5–36.5)
MCV RBC AUTO: 96 FL (ref 78–100)
PATH REPORT.COMMENTS IMP SPEC: NORMAL
PATH REPORT.COMMENTS IMP SPEC: NORMAL
PATH REPORT.FINAL DX SPEC: NORMAL
PATH REPORT.GROSS SPEC: NORMAL
PATH REPORT.MICROSCOPIC SPEC OTHER STN: NORMAL
PATH REPORT.RELEVANT HX SPEC: NORMAL
PHOTO IMAGE: NORMAL
PLATELET # BLD AUTO: 300 10E3/UL (ref 150–450)
POTASSIUM BLD-SCNC: 4.9 MMOL/L (ref 3.4–5.3)
PROT SERPL-MCNC: 5.1 G/DL (ref 6.8–8.8)
RBC # BLD AUTO: 2.95 10E6/UL (ref 4.4–5.9)
SODIUM SERPL-SCNC: 136 MMOL/L (ref 133–144)
TACROLIMUS BLD-MCNC: 8 UG/L (ref 5–15)
TME LAST DOSE: NORMAL H
TME LAST DOSE: NORMAL H
WBC # BLD AUTO: 5.6 10E3/UL (ref 4–11)

## 2021-07-19 PROCEDURE — 88307 TISSUE EXAM BY PATHOLOGIST: CPT | Mod: 26 | Performed by: PATHOLOGY

## 2021-07-19 PROCEDURE — 80197 ASSAY OF TACROLIMUS: CPT | Performed by: NURSE PRACTITIONER

## 2021-07-19 PROCEDURE — 86140 C-REACTIVE PROTEIN: CPT | Performed by: NURSE PRACTITIONER

## 2021-07-19 PROCEDURE — 80053 COMPREHEN METABOLIC PANEL: CPT | Performed by: PHYSICIAN ASSISTANT

## 2021-07-19 PROCEDURE — 85027 COMPLETE CBC AUTOMATED: CPT | Performed by: PHYSICIAN ASSISTANT

## 2021-07-19 PROCEDURE — 82310 ASSAY OF CALCIUM: CPT | Performed by: PHYSICIAN ASSISTANT

## 2021-07-19 PROCEDURE — 250N000012 HC RX MED GY IP 250 OP 636 PS 637

## 2021-07-19 PROCEDURE — 250N000013 HC RX MED GY IP 250 OP 250 PS 637

## 2021-07-19 PROCEDURE — 88350 IMFLUOR EA ADDL 1ANTB STN PX: CPT | Mod: 26 | Performed by: PATHOLOGY

## 2021-07-19 PROCEDURE — 250N000013 HC RX MED GY IP 250 OP 250 PS 637: Performed by: NURSE PRACTITIONER

## 2021-07-19 PROCEDURE — 85610 PROTHROMBIN TIME: CPT | Performed by: NURSE PRACTITIONER

## 2021-07-19 PROCEDURE — 99239 HOSP IP/OBS DSCHRG MGMT >30: CPT | Mod: 24 | Performed by: NURSE PRACTITIONER

## 2021-07-19 PROCEDURE — 36415 COLL VENOUS BLD VENIPUNCTURE: CPT | Performed by: NURSE PRACTITIONER

## 2021-07-19 PROCEDURE — 83735 ASSAY OF MAGNESIUM: CPT | Performed by: PHYSICIAN ASSISTANT

## 2021-07-19 PROCEDURE — 88346 IMFLUOR 1ST 1ANTB STAIN PX: CPT | Mod: 26 | Performed by: PATHOLOGY

## 2021-07-19 PROCEDURE — 250N000009 HC RX 250: Performed by: NURSE PRACTITIONER

## 2021-07-19 PROCEDURE — 80197 ASSAY OF TACROLIMUS: CPT | Performed by: INTERNAL MEDICINE

## 2021-07-19 PROCEDURE — 250N000012 HC RX MED GY IP 250 OP 636 PS 637: Performed by: NURSE PRACTITIONER

## 2021-07-19 RX ORDER — VALGANCICLOVIR 450 MG/1
900 TABLET, FILM COATED ORAL DAILY
COMMUNITY
Start: 2021-07-19 | End: 2021-08-18

## 2021-07-19 RX ORDER — TACROLIMUS 1 MG/1
4 CAPSULE ORAL 2 TIMES DAILY
Qty: 720 CAPSULE | Refills: 11 | COMMUNITY
Start: 2021-07-19 | End: 2021-07-23

## 2021-07-19 RX ORDER — MYCOPHENOLATE MOFETIL 250 MG/1
500 CAPSULE ORAL 2 TIMES DAILY
Qty: 360 CAPSULE | Refills: 1 | Status: ON HOLD | COMMUNITY
Start: 2021-07-19 | End: 2021-09-02

## 2021-07-19 RX ADMIN — CALCIUM CARBONATE 600 MG (1,500 MG)-VITAMIN D3 400 UNIT TABLET 1 TABLET: at 08:35

## 2021-07-19 RX ADMIN — Medication 50 MG: at 08:34

## 2021-07-19 RX ADMIN — UMECLIDINIUM 1 PUFF: 62.5 AEROSOL, POWDER ORAL at 08:33

## 2021-07-19 RX ADMIN — ANAKINRA 100 MG: 100 INJECTION, SOLUTION SUBCUTANEOUS at 09:59

## 2021-07-19 RX ADMIN — GABAPENTIN 300 MG: 300 CAPSULE ORAL at 08:34

## 2021-07-19 RX ADMIN — TACROLIMUS 4 MG: 1 CAPSULE ORAL at 08:34

## 2021-07-19 RX ADMIN — VALGANCICLOVIR 900 MG: 450 TABLET, FILM COATED ORAL at 08:34

## 2021-07-19 RX ADMIN — PANTOPRAZOLE SODIUM 40 MG: 40 TABLET, DELAYED RELEASE ORAL at 08:34

## 2021-07-19 RX ADMIN — MYCOPHENOLATE MOFETIL 500 MG: 250 CAPSULE ORAL at 08:34

## 2021-07-19 RX ADMIN — BUPROPION HYDROCHLORIDE 75 MG: 75 TABLET, FILM COATED ORAL at 08:34

## 2021-07-19 RX ADMIN — PREDNISONE 15 MG: 5 TABLET ORAL at 08:34

## 2021-07-19 RX ADMIN — NYSTATIN 1000000 UNITS: 500000 SUSPENSION ORAL at 08:34

## 2021-07-19 RX ADMIN — ASPIRIN 81 MG CHEWABLE TABLET 81 MG: 81 TABLET CHEWABLE at 08:34

## 2021-07-19 RX ADMIN — ALLOPURINOL 200 MG: 100 TABLET ORAL at 08:34

## 2021-07-19 RX ADMIN — FLUTICASONE FUROATE AND VILANTEROL TRIFENATATE 1 PUFF: 100; 25 POWDER RESPIRATORY (INHALATION) at 08:33

## 2021-07-19 ASSESSMENT — ACTIVITIES OF DAILY LIVING (ADL)
ADLS_ACUITY_SCORE: 15

## 2021-07-19 NOTE — PLAN OF CARE
Occupational Therapy Discharge Summary    Reason for therapy discharge:    Discharged to home with home therapy.    Progress towards therapy goal(s). See goals on Care Plan in [facility] electronic health record for goal details.  Adequate for discharge home     Therapy recommendation(s):    Continued therapy is recommended.  Rationale/Recommendations:  Recommend HH OT to progress ax tolerance and strength for ADL/IADL tasks..

## 2021-07-19 NOTE — PLAN OF CARE
Physical Therapy Discharge Summary    Reason for therapy discharge:    Discharged to home with home therapy.    Progress towards therapy goal(s). See goals on Care Plan in [facility] electronic health record for goal details.  Goals met/adequate for discharge    Therapy recommendation(s):    Continued therapy is recommended.  Rationale/Recommendations:  increase strength and activity tolerance.

## 2021-07-19 NOTE — PROGRESS NOTES
Prior Authorization Denial    Kineret 100mg/0.67ml syringes  Date Initiated: 7/19/2021  Date Completed: 7/20/2021  Prior Auth Type: Clinical                Status: Denied    Denial Date: 07/20/2021   Denial Reasoning: Diagnosis not FDA Approved   Appeal Info: Call [phone]    Insurance: [employer] - Phone [phone] Fax [phone]  ID: [id number]  Case Number: [id number] / [id number]   Submitted Via: Octavio Burroughs  [facility] Liaison  Ph: [phone] Pager: [phone]

## 2021-07-19 NOTE — PLAN OF CARE
Pt cleared for discharge to home this morning. Labs stable. Gout pain improved since starting subcutaneous injections. Plan for pt to return for chest CT on 7/23 with labs prior. Pt to resume home care services as well for RN & PT/OT. No new rxs needed. Reviewed AVS with pt-questions answered. Pt's wife to provide transportation home.

## 2021-07-19 NOTE — PROGRESS NOTES
Care Management Discharge Note    Discharge Date: 07/19/2021     Discharge Disposition: Home    Discharge Services: Home Care    Discharge DME: N/A    Discharge Transportation: family or friend will provide    Private pay costs discussed: N/A    Additional Information:  Per NP, pt medically ready for discharge today. PT/OT now recommending discharge to home with  PT/OT. This writer placed resumption orders for [facility] for RN/PT/OT and updated liason of plan for discharge today.     CC will continue to monitor patient's medical condition and progress towards discharge.  Rin Ceballos RN BSN  6C Unit Care Coordinator  Phone number: [phone]  Pager: [phone]

## 2021-07-19 NOTE — DISCHARGE SUMMARY
[facility]   Cardiology II Service / Advanced Heart Failure  Discharge Summary     Jim Willingham MRN# [id number]   YOB: 1955 Age: 66 year old     DATE OF ADMISSION:  7/8/2021  DATE OF DISCHARGE:  7/19/2021  ADMITTING PROVIDER: Nicola Seth MD  DISCHARGE PROVIDER: Soraya Marshall FNP-C  PRIMARY PROVIDER:   Ricardo Gómez    ADMIT DIAGNOSES:   1. Moderate Right Exudative Effusion  2. RLL Cavitary Lesion    DISCHARGE DIAGNOSES:   1. S/p OHT  2. Mediastinal fluid collection   3. Severe Protein Calorie Malnutrition  4. Transaminitis secondary to questionable choledocholithiasis  5. Left MCP Pseudogout, acute flare  6. DM Type II, Controlled  7. Generalized weakness  8. Depression  9. COPD    HPI: Please see the detailed H & P by Dr. Seth from 7/8/2021. Jim Willingham is a 66 year old male with a history of NICM s/p OHT (5/6/21) postoperative course was complicated by right pleural air leak with prolonged chest tube, paroxysmal atrial fibrillation, COPD, CKDIII, DMII, anemia who presents to the hospital for generalized weakness and fatigue. He was recently admitted on 6/28/21 for sepsis secondary to community-acquired pneumonia  in the RLL. He was treated with cefepime (6/28-7/3), levaquin (7/3-7/4), azithromycin (6/29-7/1), and vancomycin (6/29-7/1) with 1/2 blood cultures with Staph. Epi 2/2 contaminant. A diagnostic and therapeutic thoracentesis was completed on 6/29 with 270ml removed. The pleural fluid was exudative and culture was negative. He was discharged home      Since discharge, he has been fatigued and weak while walking. He can walk through his hallway but has to sit down due to weakness at the end. He has fallen 3-4 times since discharge. He continues to have some lightheadedness after standing up too fast that resolves within around 30 seconds. He has been sleeping 9 hours a night, has a poor appetite and eats only 1 meal a day.      Two days ago, he started to gradually have  "swelling in the MCP of his left pointer finger that acutely worsened today. He states it was similar to his past gout flares with pain to touch and movement that slightly improved with Tylenol.      He denies any fevers, chills, changes in vision, shortness of breath, chest pain, cough, swelling in extremities, nausea, vomiting, constipation, diarrhea, abdominal pain, heart palpitations.     PHYSICAL EXAM:  Blood pressure 107/79, pulse 100, temperature 98.1  F (36.7  C), temperature source Oral, resp. rate 16, height 1.727 m (5' 8\"), weight 89.4 kg (197 lb 3.2 oz), SpO2 99 %.  GENERAL: Appears alert and oriented times three.   HEENT: Eye symmetrical and free of discharge bilaterally. Mucous membranes moist and without lesions.  NECK: Supple and without lymphadenopathy. JVD below clavicular line.   CV: RRR, S1S2 present without murmur, rub, or gallop.   RESPIRATORY: Respirations regular, even, and unlabored. Lungs CTA throughout.   GI: Soft and non distended with normoactive bowel sounds present in all quadrants. No tenderness, rebound, guarding. No organomegaly.   EXTREMITIES: No peripheral edema. 2+ bilateral pedal pulses.   NEUROLOGIC: Alert and orientated x 3. CN II-XII grossly intact. No focal deficits.   MUSCULOSKELETAL: No joint swelling or tenderness.   SKIN: No jaundice. No rashes or lesions.     LABS:   Last CBC:   Recent Labs   Lab Test 07/19/21  0630   WBC 5.6   RBC 2.95*   HGB 8.4*   HCT 28.2*   MCV 96   MCH 28.5   MCHC 29.8*   RDW 16.6*          Last CMP:  Recent Labs   Lab Test 07/19/21  0630      POTASSIUM 4.9   CHLORIDE 109   QAMAR 7.9*   CO2 24   BUN 27   CR 1.26*   GLC 90   *   *   BILITOTAL 0.3   ALBUMIN 2.2*   PROTTOTAL 5.1*   ALKPHOS 537*       IMAGING:  Results for orders placed or performed during the hospital encounter of 07/08/21   XR Chest 2 Views    Narrative    EXAM: XR CHEST 2 VW  7/8/2021 4:08 PM     HISTORY:  weakness       COMPARISON:  Chest x-ray 7/1/2021, " 6/29/2021.    FINDINGS:   AP and lateral upright chest x-ray. Postsurgical changes chest. Median  sternotomy wires with fractured top 2 wires unchanged. Abandoned left  chest wall ICD lead.    Trachea is midline. Cardiac silhouette is within normal limits.  Pulmonary vasculature is indistinct. Right greater than left mixed  interstitial and airspace opacities, increased from prior exam.  Moderate-sized right pleural effusion, increased from prior exam.  Stable small left pleural effusion. Aortic arch atherosclerotic  calcifications.     No acute osseous abnormality. Visualized soft tissues and upper  abdomen are unremarkable.       Impression    IMPRESSION:   1. Increased right greater than left mixed interstitial and airspace  opacities suggestive for pulmonary edema.  2. Moderate size right pleural effusion, increased from prior exam.   3. Stable small left pleural effusion.    I have personally reviewed the examination and initial interpretation  and I agree with the findings.    ILYA HOBBS MD         SYSTEM ID:  [id number]   XR Finger Left G/E 2 Views    Narrative    EXAM: XR FINGER LEFT G/E 2 VIEWS  DATE/TIME: 7/8/2021 7:52 PM     INDICATION: Left finger joint swelling and pain.   COMPARISON: None.      Impression    IMPRESSION:  1.  No fracture or joint malalignment.  2.  Chondrocalcinosis and capsular calcification at the second and  third MCP joints.  3.  Small ossicle adjacent to the thumb CMC joint.       ANURADHA REMY MD         SYSTEM ID:  [id number]   POC US Guide for Thorcentesis    Impression    Moderate to large simple appearing pleural effusion. Sample sent for testing, see consult note for details   XR Chest Port 1 View    Narrative    Exam: XR CHEST PORT 1 VIEW, 7/10/2021 4:11 PM    Indication: follow-up diagnostic thoracentesis    Comparison: 7/8/2021 chest x-ray    Findings:   Portable, AP view the chest. Median sternotomy wires and retained ICD  leads are stable in position. Small decrease  in size of right pleural  effusion. No left pleural effusion. No pneumothorax. No focal  consolidation. Streaky right basilar airspace opacities.       Impression    Impression: Decrease in size of right pleural effusion with overlying  airspace opacities, most suggestive of atelectasis. No discernible  pneumothorax.    I have personally reviewed the examination and initial interpretation  and I agree with the findings.    WILNER CADET MD         SYSTEM ID:  [id number]   IR Chest Tube Place Non Tunneled Right    Narrative    History: The patient has a history of a right pleural effusion. Chest  tube placement was requested.    Clinical History: pleural effusion.     Comparisons: Chest x-ray 7/10/2021    Staff: Dayanna Goode MD    Fellow/Resident: Lyndon Maynard MD.     Monitoring: Patient was placed on continuous monitoring with  intravenous conscious sedation administered by the IR nursing staff  and supervised by the IR attending. Patient remained stable throughout  the procedure.     Medications:  1. Versed IV: 0 mg  2. Fentanyl IV: 75 mcg    Sedation time, face-to-face: 20 minutes    Fluoroscopy time: 0 minutes    Complication: None    Procedure details and findings:     Ultrasound was used to evaluate the right chest. An effusion was seen  with the patient in the upright position. The chest was then prepped  and draped. 1% lidocaine was used to anesthetize the subcutaneous  tissues down to the level of the rib. A Yueh needle was advanced over  the top of the rib and into the pleural space. Upon return of fluid,  the Yueh catheter was advanced off the needle. A wire was placed  through the Yueh catheter and the tract was dilated to 12 American. Over  the wire an 12 American Skater catheter was advanced into the pleural  space. The catheter was secured in place with a suture and a sterile  bandage was applied.       Impression    IMPRESSION:   Successful placement of a 12 American nonlocking skater  pigtail  catheter.    PLAN:   Patient to return to floor chest tube to suction.   XR Chest Port 1 View    Narrative    Portable chest    INDICATIONS: Follow-up pleural effusion status post chest tube  placement    COMPARISON: 7/10/2021    FINDINGS: Continued blunting of the right costophrenic angle. Trace  left costophrenic angle blunting is similar in appearance. Median  sternotomy. Retained ICD lead again noted via a bridging innominate  vein approach. Aortic arch atherosclerosis. Heart size appears normal.  No definite pneumothorax.      Impression    IMPRESSION: Continued presence of right pleural effusion. Median  sternotomy. Trace left pleural effusion unchanged.    PADILLA MCCORMICK MD         SYSTEM ID:  [id number]   XR Chest Port 1 View    Narrative    Exam: Chest x-ray, 1 view 7/13/2021 6:17 AM    Indication: Monitor pleural effusion, status post chest tube    Comparison: X-ray 7/12/2021    Findings:   Postoperative chest with fractured superior most and second median  sternotomy wires, similar to prior. Right basilar chest tube. Retained  ICD lead. No pneumothorax. Small bilateral pleural effusions.  Unchanged cardiac silhouette. Right basilar haziness. Linear opacity  overlying the right mid lung. Upper abdomen is unremarkable. No  aggressive osseous lesions.      Impression    Impression:   1. Small bilateral pleural effusions with bibasilar streakiness,  likely atelectasis.  2. Linear opacity overlying the right midlung, likely atelectasis.    I have personally reviewed the examination and initial interpretation  and I agree with the findings.    TERA LEGGETT DO         SYSTEM ID:  [id number]   CT Chest w/o Contrast    Narrative    EXAMINATION: CT CHEST W/O CONTRAST, 7/13/2021 2:27 PM    TECHNIQUE:  Helical CT images from the thoracic inlet through the lung  bases were obtained without IV contrast.     COMPARISON: Chest x-ray same day.    HISTORY: Pneumonia, effusion or abscess suspected, xray  done    FINDINGS:  Support devices: Abandoned left chest wall ICD lead. Posterior right  basilar chest tube in stable position.    Chest:     Thyroid gland is normal. Heart transplant size is normal. No  pericardial effusion. No significant coronary artery calcifications.  Thoracic aorta is normal in caliber with scattered atherosclerotic  calcifications in conventional three-vessel branching pattern of the  aortic arch. Main pulmonary artery is normal in caliber.    Enlarged pretracheal lymph node measuring up to 12 mm (series 2, image  25). No axillary adenopathy. Significant debris within the esophagus.  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 Trachea and  central airways are clear. Small right hydropneumothorax, unchanged  with associated basilar compressive atelectasis. Multifocal right  lower lobe peribronchovascular consolidation as seen on series 6,  image 172. . There are two 3 mm nodules in the left upper lobe (series  6, image 122), the most anterior demonstrates increased density  compared to prior exam 6/29/2021.    Abdomen: Examination of the upper abdomen is limited. Post surgical  Sylvester-en-Y gastric bypass changes. No adrenal mass.    Bones: No suspicious osseous lesion. Bilateral gynecomastia.      Soft Tissues: No suspicious mass.      Impression    IMPRESSION:   1. Right lower lobe pneumonia as well as cavitation of prior right  lower lobe medial consolidation. Likely atypical organisms. Interval  increased density of a 3 mm nodule in the left upper lobe since  6/29/2021 suggestive for additional nidus of infection. Recommend  continued short-term interval imaging follow-up.  2. Small right hydropneumothorax with stable positioning of posterior  right basilar chest tube. Fluid component is significantly decreased  when compared to prior exam 6/29/2021.  3. Significant debris within the esophagus which may be seen in the  setting of esophageal dysmotility. Follow-up esophagram may  be  considered for further evaluation.  4. Mediastinal adenopathy, likely reactive.  5. Heart transplant changes, with fluid in the anterior mediastinum  tracking inferiorly subxiphoid. A contrast enhanced CT could be  helpful if concern for abscess, or FNA could be considered.    I have personally reviewed the examination and initial interpretation  and I agree with the findings.    ILYA HOBBS MD         SYSTEM ID:  [id number]   US Abdomen Complete w Doppler Complete    Narrative    EXAMINATION: US ABDOMEN COMPLETE WITH DOPPLER COMPLETE 7/14/2021 8:40  AM     COMPARISON: 6/29/2021 CT    HISTORY: Elevated LFTs, evaluate for biliary disease or vascular  injury.    TECHNIQUE: The abdomen was scanned in standard fashion with  specialized ultrasound transducer(s) using both gray-scale, color  Doppler, and spectral flow techniques.    Findings:  Liver: The liver demonstrates increased parenchymal echogenicity,  measuring 19.4 cm in craniocaudal dimension. Focal areas of fatty  sparing No evidence of a focal hepatic mass.     Extrahepatic portal vein flow is antegrade at 36 cm/s.  Right portal vein flow is antegrade, measuring 31 cm/s.  Left portal vein flow is antegrade, measuring 29 cm/s.    Flow in the hepatic artery is towards the liver and:  28 peak systolic  0.64 resistive index.     The splenic vein is patent and flow is towards the liver.  The left,  middle, and right hepatic veins are patent with flow towards the IVC.  The IVC is patent with flow towards the heart.   The visualized aorta  is not dilated.    Gallbladder: There is no wall thickening, pericholecystic fluid.  Negative sonographic Winn's sign per technologist. Mobile adherent  shadowing gallstones in the gallbladder. Bile Ducts: Both the intra-  and extrahepatic biliary system are of normal caliber.  The common  bile duct measures 2 mm.    Pancreas: Visualized portions of the head and body of the pancreas are  unremarkable.     Kidneys: Both  kidneys are of mildly increased echotexture, without  mass or hydronephrosis. No shadowing stones. Small exophytic simple  cyst of the midpole right kidney. Renal lengths: right- 9.6 cm, left-  10.7 cm.    Spleen: The spleen measures 9.7 cm in length.    Fluid: No evidence of ascites or pleural effusions.      Impression    Impression:   1.  Cholelithiasis without findings of cholecystitis.  2.  Hepatomegaly with steatosis. No biliary dilatation.  3.  Small exophytic simple cyst of the midpole right kidney.    I have personally reviewed the examination and initial interpretation  and I agree with the findings.    JOANNE MCMAHAN MD         SYSTEM ID:  [id number]   XR Chest Port 1 View    Narrative    EXAM: XR CHEST PORT 1 VIEW  7/14/2021 6:10 AM      HISTORY: monitor pleural effusion s/p chest tube    COMPARISON: CT 7/13/2021, x-ray: 7/13/2021    FINDINGS: Single frontal view of the chest. Postoperative chest with  fractured superior most and second sternotomy wires, similar to prior.  Abandoned ICD lead. Right basilar chest tube, similar to prior. No  pneumothorax. Right greater than left pleural effusions. Unchanged  cardiac silhouette. Linear opacity overlying the right midlung. Upper  abdomen is unremarkable. No aggressive osseous lesions.      Impression    IMPRESSION:    1. Right greater than left pleural effusions with bibasilar  streakiness, likely atelectasis versus pulmonary edema.  2. Unchanged opacity in the right mid zone, compared to chest x-ray  7/13/2011, likely representing atelectasis.  3. Bilateral perihilar streakiness, may represent pulmonary edema  versus atelectasis.    I have personally reviewed the examination and initial interpretation  and I agree with the findings.    KENROY CASON MD         SYSTEM ID:  [id number]   XR Chest Port 1 View    Narrative    XR CHEST PORT 1 VIEW  7/14/2021 2:01 PM      HISTORY: Follow up ct pull    COMPARISON: Chest x-ray same day, 7/13/2021.    FINDINGS:    Portable AP 30 degree upright chest x-ray. Postsurgical changes of the  chest. Stable fracturing of the most superior median sternotomy wire.  Additional median sternotomy wires are intact. Abandoned left ICD  lead. Right basilar chest tube removed.    Trachea is midline. Cardiac silhouette is within normal limits.  Pulmonary vasculature is distinct on the left. Moderate size right  pleural effusion is unchanged. Additional effusion/atelectasis along  the minor fissure is unchanged. Small right apical pneumothorax,  unchanged from prior exam when accounting for differences in patient  positioning. No pneumothorax on the left. Small left pleural effusion  likely slightly increased given the increased blunting of the left  costophrenic angle. Unchanged right greater than left perihilar  opacities.    Bony structures appear intact.      Impression    IMPRESSION:   1. Right chest tube removed with grossly unchanged small right apical  pneumothorax.  2. Unchanged right greater than left perihilar opacities, atelectasis  versus pulmonary edema.  3. Essentially unchanged right pleural effusion with slightly  increased left pleural effusion.    I have personally reviewed the examination and initial interpretation  and I agree with the findings.    PADILLA MCCORMICK MD         SYSTEM ID:  [id number]   XR Chest Port 1 View    Narrative    EXAM: XR CHEST PORT 1 VIEW  7/15/2021 6:30 AM      HISTORY: monitor pleural effusion s/p chest tube    COMPARISON: X-ray 7/14/2021    FINDINGS: Single view of the chest. Postoperative chest with fracture  of the superiormost median sternotomy wire, similar to prior.  Abandoned cardiac pacer lead. Small right apical pneumothorax,  unchanged from prior. No left pneumothorax. Unchanged bilateral  pleural effusions, right greater than left. Fluid along the right  minor fissure, unchanged. Unchanged cardiac silhouette. Unchanged  right greater than left perihilar opacities. Visualized upper  abdomen  and bones are unremarkable.      Impression    IMPRESSION:  1. Stable right apical pneumothorax.  2. Stable bilateral pleural effusions, right greater than left.  3. Right greater than left perihilar opacities with fluid in the right  minor fissure, concerning for continued mild pulmonary edema versus  atelectasis.    I have personally reviewed the examination and initial interpretation  and I agree with the findings.    JOANNE MCMAHAN MD         SYSTEM ID:  [id number]   XR Chest Port 1 View    Narrative    Exam: Chest x-ray, 1 view, 7/16/2021 6:25 AM    Indication: Monitor effusions    Comparison: X-ray 7/15/2021    Findings:   Postoperative chest with fractured superior most sternotomy wire,  similar to prior. Abandoned  pacer/implantable cardiac defibrillator  lead. Small right apical pneumothorax, similar to prior. No left  apical pneumothorax. Bilateral pleural effusions, right greater than  left. Fluid in the right minor fissure. Unchanged cardiac silhouette.  Right greater than left perihilar and lower lobe opacities, similar to  prior. Visualized upper abdomen is unremarkable. No aggressive osseous  lesions.      Impression    Impression:   1. Small apical pneumothorax, similar to prior.  2. Bilateral pleural effusions, right greater than left.  3. Right greater than left perihilar and lower lobe opacities, similar  to prior.    I have personally reviewed the examination and initial interpretation  and I agree with the findings.    JEROME CUMMINGS MD         SYSTEM ID:  [id number]   XR Chest Port 1 View    Narrative    Exam:  Chest X-ray 7/17/2021 9:10 AM    History: monitor pleural effusion s/p chest tube    Comparison: 7/16/2021  Findings:   Single portal AP view of the chest. Postoperative chest with fractured  superior most sternotomy wire. Abandoned pacemaker lead. Trace right  apical pneumothorax, decreased from prior. Bilateral pleural  effusions, right greater than the left, unchanged.  Unchanged bilateral  perihilar and basilar opacities.      Impression    Impression:   1.  Slightly decreased trace right apical pneumothorax.  2.  Stable bilateral pleural effusions, perihilar and bibasilar  opacities. No chest tube is seen.    JOANNE MCMAHAN MD         SYSTEM ID:  [id number]   XR Chest Port 1 View    Narrative    Exam: AP chest radiograph 2021 8:28 AM.    HISTORY: Monitor pleural effusion status post chest tube.    COMPARISON: 2021, 2021.    FINDINGS: AP chest radiograph. Postsurgical changes of the chest.  Stable catheter sheath and sternotomy wires. Trachea is midline, heart  size is unchanged. Bilateral perihilar interstitial streaky opacities.  Stable to slightly improved right pleural effusion with associated  right basilar atelectasis. No or pneumothorax. No acute osseous or  upper abdominal abnormality.      Impression    IMPRESSION:   1. Stable to slightly reduced right pleural effusion with associated  right basilar atelectasis.  2. Slightly increased bilateral perihilar interstitial streaky  opacities, atelectasis versus infection.    I have personally reviewed the examination and initial interpretation  and I agree with the findings.    JOANNE MCMAHAN MD         SYSTEM ID:  [id number]   Echocardiogram Limited    Narrative    [id number]  [id number]  [id number]  [id number]^AMNA     [facility]  [address] [place] [id number]     Name: LOIS FULLER  MRN: [id number]  : 1955  Study Date: 2021 01:19 PM  Age: 66 yrs  Gender: Male  [place] [facility] [place]  Reason For Study: Transplant - Heart, eval for pericardial effusion  Ordering Physician: AMNA WINTERS  Performed By: Afshin Benoit RDCS     BSA: 2.0 m2  Height: 68 in  Weight: 191 lb  HR: 105  BP: 105/68 mmHg  ______________________________________________________________________________  Procedure  Limited Portable Echo  Adult.  ______________________________________________________________________________  Interpretation Summary  Limited Echo to assess for pericardial effusion.  Global and regional left ventricular function is normal with an EF of 60-65%.  Right ventricular function, chamber size, wall motion, and thickness are  normal.  Small loculated pericardial effusion with organized material anterior to right  ventricle measuring 1.4 cm in maximal diameter. No evidence of tamponade  physiology.  The inferior vena cava is normal.     This study was compared with the study from 6/29/2021. Small loculated  pericardial effusion is now present.     ______________________________________________________________________________  Left Ventricle  Global and regional left ventricular function is normal with an EF of 60-65%.  No regional wall motion abnormalities are seen.     Right Ventricle  Right ventricular function, chamber size, wall motion, and thickness are  normal.     Mitral Valve  The mitral valve is normal.     Aortic Valve  The aortic valve cannot be assessed.     Tricuspid Valve  The tricuspid valve is normal.     Pulmonic Valve  The pulmonic valve cannot be assessed.     Vessels  The inferior vena cava is normal. The aorta root cannot be assessed. The  thoracic aorta cannot be assessed. IVC diameter <2.1 cm collapsing >50% with  sniff suggests a normal RA pressure of 3 mmHg.     Pericardium  Small loculated pericardial effusion with organized material anterior to right  ventricle measuring 1.4 cm in maximal diameter. No evidence of tamponade  physiology.     Compared to Previous Study  This study was compared with the study from 6/29/2021 . Small loculated  pericardial effusion is now present.     Attestation  I have personally viewed the imaging and agree with the interpretation and  report as documented by the fellow, Christin Denson, and/or edited by  me.  ______________________________________________________________________________  Report approved by: Shayne Richardson 07/12/2021 01:58 PM     ______________________________________________________________________________      Cardiac Catheterization    Narrative      Normal hemodynamics.        *Note: Due to a large number of results and/or encounters for the requested time period, some results have not been displayed. A complete set of results can be found in Results Review.     CONSULTATIONS:   1. ID  2. CVTS    HOSPITAL COURSE:   Moderate Right exudative effusion. RLL Cavitary lesion. [place] was admitted for persistent weakness and frequent falls following admission and treatment for complicated CAP. Appreciate Transplant ID consult. He underwent diagnostic thoracentesis 7/10 consistent with exudate, TG negative for chylothorax. Aerobic, fungal, anaerobic pleural cx from 7/10 and 7/11 negative. Acid Fast and Mycobacterial stains and culture from 7/11 negative. Repeat Chest CT 7/14 consistent with RLL cavitary lesion and KALI nodules x3. BD glucan indeterminate at 67. Aspergillus negative. Chest X-ray with decreased effusion, increased streaky opacities 7/18. Case discussed with ID and noted recommendation for follow up with clearance for discharge. Repeat CT Chest/Abd/Pelvis with contrast 7/23/21. Will need labs prior to ensure renal function stable to proceed.      Mediastinal fluid collection. Pericardial effusion. Newly identified pericardial effusion 7/12/21 1.4 cm in diameter per echo. Chest CT 7/13 consistent with anterior mediastinal fluid tracking. Appreciate Transplant ID consult. Unable to obtain specimen without surgical procedure  Per Appreciate CVTS consult. Monitor clinically at this time.   - Cardiac biopsy pending with normal filling pressures and output.      S/p OHT. 5/6/21 complicated by right pleural air leak and prolonged CT, Afib, and CAP with cavitary lesion as above.   RHC 7/16 with  mRA-7, mPCW-11, MICHELLE CO-6.41, and MICHELLE CI-3.19. Echo 7/12 with EF 60-65%, normal RV function, and pericardial effusion without evidence of tamponade, and normal IVC.   Immunosuppression: Prednisone 15 mg po daily, Cellcept 500 mg po BID, and Tac 4 mg po BID. Tac level 8.9 (goal 8-10). Repeat level pending.   Serostatus: CMV: D+/R+, EBV: D+/R+, Toxo D-/R-  Prophylaxis: Nystatin, Pentamidine (7/12), and Valcyte.   - Continue ASA. Crestor held in setting of transaminitis.   - Cardiac biopsy pending.      Severe protein calorie malnutrition. History of [place] En Y. Appreciate Nutrition consult. Appetite improving and tolerating oral intake well prior to discharge.      Transaminitis with questionable Choledocholithiasis. Abdominal US consistent with hepatomegaly with steatosis, CBD 2 mm, and patent vasculature. Appreciate Hepatology consult. Tbili WNL. Persistent transaminitis, improving. Case discussed with hepatology, continue current regimen and trend outpt. No current contraindication with Anakinra. Repeat CMP 7/23.     Left MCP Pseudogout, acute flare improving. Left Hand X-ray 7/8/21 with chondrocalcinosis and capsular calcification at the second and third MCP joints. Synovial fluid analysis negative for gram stain (+ WBC, but no organisms, pseudogout crystals). Culture +staph epi, thought to be a contaminent given growth in broth only on second day of monitoring as well as pseudogout crystals also present in the fluid and improvement on anakinra. Anakinra 100 mg times two inpatient with improvement in symptoms. Allopurinol 300 mg po daily at discharge given improved renal function.      DM Type II, controlled.  Lantus decreased to 18 units daily due to hypoglycemia. Novolog low intensity sliding scale insulin.      Generalized weakness. Frequent falls. Appreciate PT/OT. Cleared for discharge to home with home PT/OT.      Chronic Medical Conditions:   Depression. Continue Wellbutrin 75 BID  COPD.  Continue Monetlukast, Trelegy Ellipta, Albuterol    DISCHARGE MEDICATIONS:  Discharge Medication List as of 7/19/2021  9:36 AM      CONTINUE these medications which have CHANGED    Details   insulin glargine (LANTUS PEN) 100 UNIT/ML pen Inject 18 Units Subcutaneous At Bedtime, Disp-15 mL, R-1, HistoricalIf Lantus is not covered by insurance, may substitute Basaglar at same dose and frequency.        mycophenolate (GENERIC EQUIVALENT) 250 MG capsule Take 2 capsules (500 mg) by mouth 2 times daily, Disp-360 capsule, R-1, Historical      tacrolimus (GENERIC EQUIVALENT) 1 MG capsule Take 4 capsules (4 mg) by mouth 2 times daily, Disp-720 capsule, R-11, Historical      valGANciclovir (VALCYTE) 450 MG tablet Take 2 tablets (900 mg) by mouth daily, Historical         CONTINUE these medications which have NOT CHANGED    Details   acetaminophen (TYLENOL) 325 MG tablet Take 3 tablets (975 mg) by mouth every 6 hours as needed for other (For optimal non-opioid multimodal pain management to improve pain control.), Disp-100 tablet, R-1, E-Prescribe      albuterol (PROAIR HFA) 108 (90 Base) MCG/ACT inhaler Inhale 2 puffs into the lungs every 4 hours as needed for shortness of breath / dyspnea or wheezing, Disp-6.7 g, R-1, E-PrescribePharmacy may dispense brand covered by insurance (Proair, or proventil or ventolin or generic albuterol inhaler)      allopurinol (ZYLOPRIM) 300 MG tablet Take 1 tablet (300 mg) by mouth daily, Disp-90 tablet, R-1, E-Prescribe      amitriptyline (ELAVIL) 25 MG tablet Take 1 tablet (25 mg) by mouth At Bedtime, Historical      artificial tears OINT ophthalmic ointment Place 1 g into both eyes At Bedtime, Disp-1 g, R-1, E-Prescribe      aspirin (ASA) 81 MG chewable tablet Take 1 tablet (81 mg) by mouth daily, Disp-100 tablet, R-1, E-Prescribe      buPROPion (WELLBUTRIN) 75 MG tablet Take 1 tablet (75 mg) by mouth 2 times daily, Disp-60 tablet, R-1, E-Prescribe      calcium citrate-vitamin D (CITRACAL)  315-250 MG-UNIT TABS per tablet Take 1 tablet by mouth 2 times daily, Disp-60 tablet, R-1, E-Prescribe      cyclobenzaprine (FLEXERIL) 5 MG tablet Take 5 mg by mouth 3 times daily as needed for muscle spasms, Historical      Fluticasone-Umeclidin-Vilanterol (TRELEGY ELLIPTA) 100-62.5-25 MCG/INH oral inhaler Inhale 1 puff into the lungs daily, Disp-1 each, R-1, E-Prescribe      HUMALOG KWIKPEN 100 UNIT/ML soln Inject 4 units with Glucerna and 8 units with meals plus 1-9 units per sliding scale with meals and before bed., Disp-3 mL, R-3, CASTILLO, Historical      Melatonin 10 MG CAPS Take 1 capsule by mouth At Bedtime, Historical      montelukast (SINGULAIR) 10 MG tablet Take 1 tablet (10 mg) by mouth At Bedtime, Disp-90 tablet, R-1, E-Prescribe      nystatin (MYCOSTATIN) 477241 UNIT/ML suspension Swish and swallow 10 mLs (1,000,000 Units) in mouth 4 times dailyDisp-1200 mL, [id number]      pantoprazole (PROTONIX) 40 MG EC tablet Take 1 tablet (40 mg) by mouth 2 times daily, Disp-60 tablet, R-1, E-Prescribe      polyethylene glycol (MIRALAX) 17 GM/Dose powder Take 1 capful by mouth daily as needed for constipation, Historical      predniSONE (DELTASONE) 10 MG tablet Take 1.5 tablets (15 mg) by mouth every morning, Disp-120 tablet, R-1, E-PrescribeTXP DT 5/6/2021 (Heart) TXP Dischg DT 5/31/2021 DX Heart replaced by transplant Z94.1 [facility] [place] ([place], [place])      senna-docusate (SENOKOT-S/PERICOLACE) 8.6-50 MG tablet Take 2 tablets by mouth daily as needed for constipation, Disp-180 tablet, R-3, E-Prescribe      traMADol (ULTRAM) 50 MG tablet Take 0.5-1 tablets (25-50 mg) by mouth every 6 hours as needed for moderate pain, Disp-25 tablet, R-1, E-Prescribe      ACE/ARB/ARNI NOT PRESCRIBED (INTENTIONAL) Historical      blood glucose (ONE TOUCH DELICA) lancing device Lancing device to be used with lancets.Disp-1 each, [id number]      blood glucose monitoring (ONE TOUCH  ULTRA 2) meter device kit Use to test blood sugar four times daily or as directed.Disp-1 kit, [id number]      blood glucose VI test strip Use to test blood sugar four times daily or as directed., Disp-200 each, R-0, E-Prescribe      Continuous Blood Gluc Transmit (DEXCOM G6 TRANSMITTER) MISC Historical      !! gabapentin (NEURONTIN) 300 MG capsule Take 1 capsule (300 mg) by mouth 2 times daily At 12pm and 8pm, Disp-60 capsule, R-1, E-Prescribe      !! gabapentin (NEURONTIN) 300 MG capsule Take 1 capsule (300 mg) by mouth every morning, Disp-30 capsule, R-1, E-Prescribe      insulin pen needle (32G X 4 MM) 32G X 4 MM miscellaneous Use four pen needles daily or as directed.Disp-110 each, [id number]       !! - Potential duplicate medications found. Please discuss with provider.      STOP taking these medications       bumetanide (BUMEX) 1 MG tablet Comments:   Reason for Stopping:         rosuvastatin (CRESTOR) 10 MG tablet Comments:   Reason for Stopping:         sulfamethoxazole-trimethoprim (BACTRIM) 400-80 MG tablet Comments:   Reason for Stopping:         tacrolimus (GENERIC EQUIVALENT) 0.5 MG capsule Comments:   Reason for Stopping:               DISCHARGE DISPOSITION: Jim DAGO Willingham will discharge to home in stable condition.     DISCHARGE INSTRUCTIONS:  Discharge Procedure Orders   CTA Chest Abdomen Pelvis w Contrast   Standing Status: Future Standing Exp. Date: 07/19/22     Order Specific Question Answer Comments   Priority Routine      Home care nursing referral   Referral Priority: Routine Referral Type: Home Health Therapies & Aides   Number of Visits Requested: 1     Home care nursing referral     Medication Therapy Management Referral   Referral Priority: Routine Referral Type: Med Therapy Management   Requested Specialty: Pharmacist   Number of Visits Requested: 1     Reason for your hospital stay   Order Comments: You were admitted to the     Activity   Order Comments: Your activity upon  discharge: activity as tolerated     Order Specific Question Answer Comments   Is discharge order? Yes      Follow Up ([facility])   Order Comments: Follow up CT Chest  with labs prior beta d glucan  Repeat CBC with diff, CMP, Tac level, and CRP .  Follow up as scheduled 21 in Cardiology clinic with RHC and echo prior.     Appointments on [place] and/or [facility] (with [facility] or [facility] provider or service). Call [phone] if you haven't heard regarding these appointments within 7 days of discharge.     Full Code     Order Specific Question Answer Comments   Code status determined by: Discussion with patient/ legal decision maker      Echocardiogram Complete   Standing Status: Future Standing Exp. Date: 22   Order Comments: Administration of IV contrast will be tailored to this examination per the appropriate written protocol listed in the Echocardiography department Protocol Book, or by the supervising Cardiologist. This may result in an order change.    Use of contrast is at the discretion of the supervising Cardiologist.   Scheduling Instructions: Please call your clinic of choice to schedule this procedure:    [facility]:   [phone]  [facility]:   [phone]  [facility]:  [phone]  [facility]): [phone]  [facility]:  [phone]  [facility]): [phone]  [facility] [phone]     Order Specific Question Answer Comments   Preferred procedure? Echo Complete    Procedure to be scheduled at [facility] [12]    Perform bubble study? No    Use strain protocol? (Cardiology use only) No    Is this being done for TAVR planning? (Cardiology use only) No      Diet   Order Comments: Follow this diet upon discharge: Regular     Order Specific Question Answer Comments   Is discharge order? Yes      Case Request Cath Lab: Heart Cath Right Heart Cath, Heart Cath Heart Biopsy   Standing Status: Future Standing Exp. Date: 22      Order Specific Question Answer Comments   Patient Class: Outpatient [102]    Scheduling Questions for Order Above RHC only    Scheduling Questions for Order Above other    H&E Yes    Immunofluorescence Yes    Reason for exam: routine bx, RHC to reassess filling pressures given c/f constriction on echo    Clinical Tier: Tier 1 (High) [100]      Case Request Cath Lab: Heart Cath Right Heart Cath, Heart Cath Heart Biopsy   Standing Status: Future Standing Exp. Date: 07/16/22     Order Specific Question Answer Comments   Patient Class: Outpatient [102]    Scheduling Questions for Order Above RHC only    Scheduling Questions for Order Above other    H&E Yes    Immunofluorescence Yes    Reason for exam: rhc bx, r/o constriction    Clinical Tier: Tier 1 (High) [100]        45 minutes spent in discharge, including >50% in counseling and coordination of care, medication review and plan of care recommended on follow up. Please do not hesitate to contact me should there be questions regarding the hospital course or discharge plan.      TIM Sood CNP FNP-C  7/19/2021  [phone]

## 2021-07-20 DIAGNOSIS — Z94.1 TRANSPLANTED HEART (H): Primary | ICD-10-CM

## 2021-07-20 DIAGNOSIS — Z94.1 S/P ORTHOTOPIC HEART TRANSPLANT (H): ICD-10-CM

## 2021-07-20 LAB
CRP SERPL-MCNC: 7.7 MG/L (ref 0–8)
TACROLIMUS BLD-MCNC: 8.1 UG/L (ref 5–15)
TME LAST DOSE: NORMAL H
TME LAST DOSE: NORMAL H

## 2021-07-20 RX ORDER — PREDNISONE 10 MG/1
15 TABLET ORAL EVERY MORNING
Qty: 120 TABLET | Refills: 1 | Status: SHIPPED | OUTPATIENT
Start: 2021-07-20 | End: 2021-08-05

## 2021-07-20 NOTE — PROGRESS NOTES
"Pt called to check in since being discharged on 7/19. Pt states he is currently doing exercises. Waiting for pt/ot to call to schedule appointments. Pt due for labs and repeat CT on 7/23. Appointments made and times reviewed with patient. Pt states he is a little \"puffy\" in bilateral lower ext this morning. Weight is 201.2 on home scale. Was 194 in hospital. Writer will review with Soraya Marshall and call patient back if any changes. Pt to call writer if any questions or concerns arise. Pt verbalized understanding and agrees with plan.   "

## 2021-07-21 ENCOUNTER — TELEPHONE (OUTPATIENT)
Dept: TRANSPLANT | Facility: CLINIC | Age: 66
End: 2021-07-21

## 2021-07-21 NOTE — TELEPHONE ENCOUNTER
Home health nurse called to check in. They will be going to see [place] twice weekly for the next few weeks. Mondays/Thursdays. Orders re-sent for labs. Low sodium diet re-enforced. Today's weight is down to 198 (was 202 yesterday). Appt time reviewed for Friday. BP stable today. Temp 97.  Ofelia (home health Rn) will call with any questions or concerns.

## 2021-07-22 LAB
BACTERIA SPEC CULT: NORMAL
BACTERIA SPEC CULT: NORMAL
Lab: NORMAL
SPECIMEN SOURCE: NORMAL

## 2021-07-23 ENCOUNTER — TELEPHONE (OUTPATIENT)
Dept: CARDIOLOGY | Facility: CLINIC | Age: 66
End: 2021-07-23

## 2021-07-23 ENCOUNTER — ANCILLARY PROCEDURE (OUTPATIENT)
Dept: CT IMAGING | Facility: CLINIC | Age: 66
End: 2021-07-23
Attending: NURSE PRACTITIONER
Payer: COMMERCIAL

## 2021-07-23 ENCOUNTER — TELEPHONE (OUTPATIENT)
Dept: TRANSPLANT | Facility: CLINIC | Age: 66
End: 2021-07-23

## 2021-07-23 ENCOUNTER — LAB (OUTPATIENT)
Dept: LAB | Facility: CLINIC | Age: 66
End: 2021-07-23

## 2021-07-23 ENCOUNTER — ANCILLARY PROCEDURE (OUTPATIENT)
Dept: CARDIOLOGY | Facility: CLINIC | Age: 66
End: 2021-07-23
Attending: NURSE PRACTITIONER
Payer: COMMERCIAL

## 2021-07-23 DIAGNOSIS — J18.9 COMMUNITY ACQUIRED PNEUMONIA, UNSPECIFIED LATERALITY: ICD-10-CM

## 2021-07-23 DIAGNOSIS — Z94.1 S/P ORTHOTOPIC HEART TRANSPLANT (H): ICD-10-CM

## 2021-07-23 DIAGNOSIS — Z94.1 TRANSPLANTED HEART (H): ICD-10-CM

## 2021-07-23 DIAGNOSIS — M10.9 GOUT: Primary | ICD-10-CM

## 2021-07-23 DIAGNOSIS — Z94.1 TRANSPLANTED HEART (H): Primary | ICD-10-CM

## 2021-07-23 LAB
ANION GAP SERPL CALCULATED.3IONS-SCNC: 5 MMOL/L (ref 3–14)
BUN SERPL-MCNC: 25 MG/DL (ref 7–30)
CALCIUM SERPL-MCNC: 8.4 MG/DL (ref 8.5–10.1)
CHLORIDE BLD-SCNC: 110 MMOL/L (ref 94–109)
CO2 SERPL-SCNC: 25 MMOL/L (ref 20–32)
CREAT SERPL-MCNC: 1.15 MG/DL (ref 0.66–1.25)
ERYTHROCYTE [DISTWIDTH] IN BLOOD BY AUTOMATED COUNT: 16.6 % (ref 10–15)
GFR SERPL CREATININE-BSD FRML MDRD: 67 ML/MIN/1.73M2
GLUCOSE BLD-MCNC: 110 MG/DL (ref 70–99)
HCT VFR BLD AUTO: 28.7 % (ref 40–53)
HGB BLD-MCNC: 8.6 G/DL (ref 13.3–17.7)
LVEF ECHO: NORMAL
MCH RBC QN AUTO: 28.6 PG (ref 26.5–33)
MCHC RBC AUTO-ENTMCNC: 30 G/DL (ref 31.5–36.5)
MCV RBC AUTO: 95 FL (ref 78–100)
PLATELET # BLD AUTO: 242 10E3/UL (ref 150–450)
POTASSIUM BLD-SCNC: 4.6 MMOL/L (ref 3.4–5.3)
RBC # BLD AUTO: 3.01 10E6/UL (ref 4.4–5.9)
SODIUM SERPL-SCNC: 140 MMOL/L (ref 133–144)
TACROLIMUS BLD-MCNC: 4.9 UG/L (ref 5–15)
TME LAST DOSE: ABNORMAL H
TME LAST DOSE: ABNORMAL H
WBC # BLD AUTO: 5.5 10E3/UL (ref 4–11)

## 2021-07-23 PROCEDURE — 71260 CT THORAX DX C+: CPT | Mod: GC | Performed by: RADIOLOGY

## 2021-07-23 PROCEDURE — 93306 TTE W/DOPPLER COMPLETE: CPT | Performed by: STUDENT IN AN ORGANIZED HEALTH CARE EDUCATION/TRAINING PROGRAM

## 2021-07-23 PROCEDURE — 85027 COMPLETE CBC AUTOMATED: CPT | Performed by: PATHOLOGY

## 2021-07-23 PROCEDURE — 36415 COLL VENOUS BLD VENIPUNCTURE: CPT | Performed by: PATHOLOGY

## 2021-07-23 PROCEDURE — 80048 BASIC METABOLIC PNL TOTAL CA: CPT | Performed by: PATHOLOGY

## 2021-07-23 PROCEDURE — 74177 CT ABD & PELVIS W/CONTRAST: CPT | Mod: GC | Performed by: RADIOLOGY

## 2021-07-23 PROCEDURE — 80197 ASSAY OF TACROLIMUS: CPT | Performed by: INTERNAL MEDICINE

## 2021-07-23 RX ORDER — COLCHICINE 0.6 MG/1
1.2 TABLET ORAL ONCE
Qty: 2 TABLET | Refills: 0 | Status: SHIPPED | OUTPATIENT
Start: 2021-07-23 | End: 2021-07-26

## 2021-07-23 RX ORDER — TACROLIMUS 1 MG/1
CAPSULE ORAL
Qty: 900 CAPSULE | Refills: 11 | Status: SHIPPED | OUTPATIENT
Start: 2021-07-23 | End: 2021-07-28

## 2021-07-23 RX ORDER — IOPAMIDOL 755 MG/ML
120 INJECTION, SOLUTION INTRAVASCULAR ONCE
Status: COMPLETED | OUTPATIENT
Start: 2021-07-23 | End: 2021-07-23

## 2021-07-23 RX ADMIN — IOPAMIDOL 120 ML: 755 INJECTION, SOLUTION INTRAVASCULAR at 08:14

## 2021-07-23 NOTE — PROGRESS NOTES
Echo reviewed with Soraya Marshall. No changes at this time. Continue to watch for recurrent dizziness and notify us if it persists. Weekend on call coordinator contact reviewed.     Colchicine 1.2 mg x 1 ordered and sent to local pharmacy.     Tac level 4.9. Confirmed 12 hour trough by patient. Goal 8-10. Current dose 4 mg bid. Pt confirmed he's been taking 1 mg tablets. So 4 tablets in am and 4 tablets in pm. Instructions given to increase to 5 mg twice a day. Repeat a level on Monday with home health RN. 12 hour trough reviewed.     Awaiting CT results. Once reviewed by provider, Writer will contact patient with any additional changes.

## 2021-07-23 NOTE — TELEPHONE ENCOUNTER
Call returned. Pt finished testing at hospital and c/o dizzy episode and recurrent gout on finger. CT results pending. Pt continues to be allopurinol 300 mg daily. Providers do not want prednisone taper due to recent fungal infection.     Discussed the above with Soraya. Once we have CT results we will touch base re: plan.

## 2021-07-23 NOTE — TELEPHONE ENCOUNTER
Patient Call: General  Route to LPN    Reason for call: connect with pt regarding questions for coordinator    Call back needed? Yes    Return Call Needed  Same as documented in contacts section  When to return call?: Greater than one day: Route standard priority

## 2021-07-23 NOTE — TELEPHONE ENCOUNTER
Case discussed with Dr. Prado with ID and Dr. Montgomery with Cardiology. Will connect with Pulmonary to expedite outpatient workup for bronchoscopy. He denies RESENDIZ and cough and remains stable with ambulation. Pt updated on results and will obtain Tac level in AM. If any sign of worsening fever, chills, RESENDIZ, or cough he will notify us ASAP.   Soraya Marshall, APRN CNP  7/23/2021

## 2021-07-23 NOTE — TELEPHONE ENCOUNTER
"Transplant Infectious Disease MD Documentation Note:    After speaking earlier this PM with Ms. Marshall (see her note below) about Mr. Willingham' repeat chest CT scan from earlier today, I now have finally had a chance to look at the scan personally and now have a different view.  The significant improvement in the prior right lower lobe pulmonary changes (consolidations, probable cavitation) is heartening and makes it very likely that was due to a bacterial pneumonic process, not some other type of infection.  His prior pleural effusion is almost entirely resolved on the new CT scan and the microbiology studies from the 7/11/21 thoracentesis were negative (as were serum Fungitell and galactomman antigen assays from 7/14/21).  The \"thick-walled, slightly rim-enhancing\" anterior mediastinal fluid collection is unchanged versus the 7/13/21 CT scan and is felt by both Radiology and (reportedly) by CVTS to be seroma rather than any infection.  There are several sub-half-centimeter nodules in the bilateral upper lobes which are stable or at most very slightly increased in size from the prior 7/13/21 scan.  Of note, there is no lymphadenopathy, reactive or otherwise.  Overall, this is not a repeat CT scan that seems very suggestive of fungal or any other atypical pneumonic infection.  Especially given that he lacks respiratory or constitutional symptoms, simply monitoring his chest with a repeat CT scan seems reasonable -- it seems attempting a bronchoscopy at this point would likely be very low yield.    So, for now, assuming he remains consistently asymptomatic, we will simply repeat his chest CT scan in four weeks.  Involving Pulmonary at this point seems excessive to me.  I discussed this update with Ms. Marshall again, who will discuss it with the patient.    Leonidas Prado MD  "

## 2021-07-23 NOTE — TELEPHONE ENCOUNTER
Pt called to inform that Soraya read CT results and is going to discuss with the ID team. A Plan will be communicated on Monday.     Since patient's tacro level was low, we would like a repeat level drawn on Saturday 7/24. 12 hour trough reviewed. Pt stated he will try his best to get an appointment and get there tomorrow. Orders in.

## 2021-07-24 ENCOUNTER — TELEPHONE (OUTPATIENT)
Dept: CARDIOLOGY | Facility: CLINIC | Age: 66
End: 2021-07-24

## 2021-07-24 NOTE — TELEPHONE ENCOUNTER
Attempted to contact patient for update regarding CT scan. Following a detailed review with Dr. Prado with ID plan would be to repeat CT scan of Chest. No current indication for bronchoscopy per ID. Left message updating patient given no answer.   Soraya Marshall, APRN CNP  7/24/2021

## 2021-07-26 ENCOUNTER — TELEPHONE (OUTPATIENT)
Dept: TRANSPLANT | Facility: CLINIC | Age: 66
End: 2021-07-26

## 2021-07-26 ENCOUNTER — VIRTUAL VISIT (OUTPATIENT)
Dept: PHARMACY | Facility: CLINIC | Age: 66
End: 2021-07-26
Attending: INTERNAL MEDICINE
Payer: COMMERCIAL

## 2021-07-26 DIAGNOSIS — M1A.00X0 GOUTY ARTHROPATHY, CHRONIC, WITHOUT TOPHI: ICD-10-CM

## 2021-07-26 DIAGNOSIS — M12.812 ROTATOR CUFF TEAR ARTHROPATHY OF BOTH SHOULDERS: ICD-10-CM

## 2021-07-26 DIAGNOSIS — M75.102 ROTATOR CUFF TEAR ARTHROPATHY OF BOTH SHOULDERS: ICD-10-CM

## 2021-07-26 DIAGNOSIS — J44.9 CHRONIC OBSTRUCTIVE PULMONARY DISEASE, UNSPECIFIED COPD TYPE (H): ICD-10-CM

## 2021-07-26 DIAGNOSIS — G47.00 INSOMNIA, UNSPECIFIED TYPE: ICD-10-CM

## 2021-07-26 DIAGNOSIS — K21.9 GASTROESOPHAGEAL REFLUX DISEASE, UNSPECIFIED WHETHER ESOPHAGITIS PRESENT: ICD-10-CM

## 2021-07-26 DIAGNOSIS — M75.101 ROTATOR CUFF TEAR ARTHROPATHY OF BOTH SHOULDERS: ICD-10-CM

## 2021-07-26 DIAGNOSIS — E78.5 DYSLIPIDEMIA: ICD-10-CM

## 2021-07-26 DIAGNOSIS — M12.811 ROTATOR CUFF TEAR ARTHROPATHY OF BOTH SHOULDERS: ICD-10-CM

## 2021-07-26 DIAGNOSIS — E11.8 TYPE 2 DIABETES MELLITUS WITH COMPLICATION, WITH LONG-TERM CURRENT USE OF INSULIN (H): ICD-10-CM

## 2021-07-26 DIAGNOSIS — Z79.4 TYPE 2 DIABETES MELLITUS WITH COMPLICATION, WITH LONG-TERM CURRENT USE OF INSULIN (H): ICD-10-CM

## 2021-07-26 DIAGNOSIS — Z94.1 TRANSPLANTED HEART (H): Primary | ICD-10-CM

## 2021-07-26 DIAGNOSIS — K59.00 CONSTIPATION, UNSPECIFIED CONSTIPATION TYPE: ICD-10-CM

## 2021-07-26 PROCEDURE — 99607 MTMS BY PHARM ADDL 15 MIN: CPT | Performed by: PHARMACIST

## 2021-07-26 PROCEDURE — 99606 MTMS BY PHARM EST 15 MIN: CPT | Performed by: PHARMACIST

## 2021-07-26 NOTE — PROGRESS NOTES
Medication Therapy Management (MTM) Encounter    ASSESSMENT:                            Medication Adherence/Access: Reminded patient the importance of taking txp medications 12 hours apart.     Heart Transplant:  Stable.     Type 2 Diabetes:  unclear how well controlled patient's BG are as he just cleared his meter. Will check up on him in a couple of weeks to see where he is running.     Shoulder Pain: No changes today. Pt to follow up with PCP to discuss Amitriptyline increases.      GERD: Stable.     Gout: Have not drawn an Uric acid since transplant. Will draw one as patient is having frequent gout flares.     Hyperlipidemia/ CVD prevention: Holding statin due to elevated LFTs    COPD: Stable.     Insomnia: Stable.     Constipation: Stable.     PLAN:                            Pt to...  1. Take Transplant meds at 9 am and 9pm, 12 hours apart.  2. Draw Uric acid tomorrow with rest of labs.   3. Follow up with PCP for shoulder pain and amitriptyline dose.     Follow-up: 8/10 at 2:30 PM    SUBJECTIVE/OBJECTIVE:                          Jim Willingham is a 66 year old male called for a transitions of care visit. He was discharged from [facility] on 7/19 for moderate right exudative effusion and RLL cavitary lesion. Weakness has been improving.      Reason for visit: 2-3 months post txp.    Allergies/ADRs: Reviewed in chart  Past Medical History: Reviewed in chart  Tobacco: He reports that he quit smoking about 26 years ago. He has never used smokeless tobacco.  Alcohol: not currently using    Medication Adherence/Access: Patient uses pill box(es) and has assistance with medication administration: family member, Cate helps due to tremors/ some confusion.  Patient takes medications 4 time(s) per day. 9am, 12pm, 5pm, 9-10 pm  Per patient, misses medication 0 times per week.   The patient fills medications at [place]: YES.     Heart Transplant:  Current immunosuppressants include Tacrolimus 4mg BID, Prednisone 10mg qAM  tapering, and Mycophenolate Mofetil 500mg twice daily.  Pt reports moderate Tremors, which have improved over time. Still hard to write.   Transplant date: 21  Estimated Creatinine Clearance: 70.5 mL/min (based on SCr of 1.15 mg/dL).  CMV prophylaxis: Valcyte 900 mg once daily treat for 3 months   PCP prophylaxis: Bactrim sliding scale every other day for 6 months post txp  Antifungal Prophylaxis: Nystatin 10 mL QID until off steroids.   Supplements: Calcium/D twice daily.   Tx Coordinator: Yolette Rueda, Using Med Card: Yes  Immunizations: annual flu shot ; Diednwkthy10:  unknown; Prevnar 13: ; TDaP:  ; Shingrix: unknown, HBV: unknown     Type 2 Diabetes:  Currently taking Lantus 18 units daily, Novolog sliding scale TID CM   Blood sugar monitorin time(s) daily. Ranges (patient reported): Fastin this morning 160-180, lots of times <140.  Fasting labs 90, 110  Symptoms of low blood sugar? Stomach ache when he goes low, but no lows since being home  Symptoms of high blood sugar? None  Dr. Mims is his endocrinology, was on metformin, but stopped in  due to worsening kidney function.     Shoulder Pain: Pt is taking Tramadol 50mg at bedtime prn (2 tabs in the last week and a half), Amitriptyline 25mg at bedtime, Gabapentin 300mg TID. Avoiding Acetaminophen due to elevated LFTs .Needs bilateral rotator cuff repairs, pain worsening as prednisone decreasing.      GERD: Current medications include: Protonix (pantoprazole) 40mg twice daily. Pt reports heartburn, for which he takes Tums. Heartburn occurs if he did not eat before takign his transplant medications. Patient feels that current regimen is effective.    Gout: Pt is taking Allopurinol 300mg daily. Having a gout flare right now. Was being treated with Anikindra in the hospital, last Saturday took Colchicine 2 tablets. Not much improved after taht so far.   Uric Acid   Date Value Ref Range Status   2021 4.8 3.5 - 7.2 mg/dL Final      Hyperlipidemia/ CVD prevention: ASPIRIN 81mg daily (no bleed sx), holding statin currently due to elevated LFTS.  Patient reports   The 10-year ASCVD risk score (Emerald KRISHNA Jr., et al., 2013) is: 12.1%    Values used to calculate the score:      Age: 66 years      Sex: Male      Is Non- : No      Diabetic: Yes      Tobacco smoker: No      Systolic Blood Pressure: 107 mmHg      Is BP treated: No      HDL Cholesterol: 87 mg/dL      Total Cholesterol: 209 mg/dL  Recent Labs   Lab Test 11/05/20  0907 07/06/18  0856   CHOL 209* 216*   HDL 87 63   * 128*   TRIG 91 123     COPD: Current medications: Trelegy Ellipta once daily. Patient rinses their mouth after using steroid inhaler.    Patient is not experiencing side effects.   Patient reports the following symptoms: none. Breathing better since his hospitalization    Insomnia: Current medications include: melatonin 10mg at bedtime. Patient reports no issues at this time. This works well.     Constipation: Pt is taking Miralax prn to keep him regular. Since discharge fairly normal BMs.     Today's Vitals: There were no vitals taken for this visit.  ----------------  Post Discharge Medication Reconciliation Status: discharge medications reconciled and changed, per note/orders.    I spent 35 minutes minutes with this patient today. All changes were made via collaborative practice agreement with Dr. Gómez. A copy of the visit note was provided to the patient's referring provider.    The patient was sent via [employer] a summary of these recommendations.     Akshat Parkinson, PharmD  [facility] Pharmacist    Phone: [phone]     Telemedicine Visit Details  Type of service:  Telephone visit  Start Time: 10:30 AM  End Time: 11:05 AM  Originating Location (patient location): Home  Distant Location (provider location):  [facility]     Medication Therapy Recommendations  COPD (chronic obstructive pulmonary disease) (H)    Current  Medication: umeclidinium (INCRUSE ELLIPTA) 62.5 MCG/INH oral inhaler (Discontinued)   Rationale: More cost-effective medication available - Cost - Adherence   Recommendation: Change Medication - Trelegy Ellipta 200-62.5-25 MCG/INH Aepb   Status: No Longer Relevant         Gastroesophageal reflux disease with esophagitis without hemorrhage    Current Medication: pantoprazole (PROTONIX) 40 MG EC tablet (Discontinued)   Rationale: No medical indication at this time - Unnecessary medication therapy - Indication   Recommendation: Discontinue Medication   Status: No Longer Relevant         Gouty arthropathy, chronic, without tophi    Current Medication: allopurinol (ZYLOPRIM) 300 MG tablet   Rationale: Medication requires monitoring - Needs additional monitoring   Recommendation: Order Lab   Status: Accepted per CPA         Rotator cuff tear arthropathy of both shoulders    Current Medication: amitriptyline (ELAVIL) 25 MG tablet   Rationale: Dose too low - Dosage too low - Effectiveness   Recommendation: Make Appt with PCP   Status: Accepted - no CPA Needed         Transplanted heart (H)    Current Medication: tacrolimus (GENERIC EQUIVALENT) 1 MG capsule   Rationale: Does not understand instructions - Adherence - Adherence   Recommendation: Provide Education   Status: Patient Agreed - Adherence/Education         Vitamin B12 deficiency (non anemic)    Current Medication: cyanocobalamin (VITAMIN B12) 1000 MCG/ML injection (Discontinued)   Rationale: Medication requires monitoring - Needs additional monitoring   Recommendation: Order Lab   Status: No Longer Relevant

## 2021-07-26 NOTE — PATIENT INSTRUCTIONS
Recommendations from today's MTM visit:                                                       1. Take Transplant meds (Tacrolimus and Mycophenolate Mofetil) at 9 am and 9pm, 12 hours apart.  2. Draw Uric acid tomorrow with rest of labs.   3. Follow up with PCP for shoulder pain and amitriptyline dose.     Follow-up: 8/10 at 2:30 PM    It was great to speak with you today.  I value your experience and would be very thankful for your time with providing feedback on our clinic survey. You may receive a survey via email or text message in the next few days.     To schedule another MTM appointment, please call the clinic directly or you may call the MTM scheduling line at [phone] or toll-free at [phone].     My Clinical Pharmacist's contact information:                                                      Please feel free to contact me with any questions or concerns you have.      Akshat Parkinson, PharmELLIE  MTM Pharmacist    Phone: [phone]

## 2021-07-26 NOTE — TELEPHONE ENCOUNTER
Pt called coordinator this morning stating he took his meds yesterday evening early at 5 pm and his blood draw won't be an accurate 12 hour trough today with the home health RN. He also stated he did not make it to his appointment on Saturday to get labs drawn. Writer explained the importance of follow up. His recent tacro level was a lot lower than his goal so we want to be sure it was an accurate level. Pt will have labs repeated Tuesday am. He will make an appt at [facility] and he will prepare for a 12 hour trough.

## 2021-07-27 ENCOUNTER — LAB (OUTPATIENT)
Dept: LAB | Facility: CLINIC | Age: 66
End: 2021-07-27
Payer: COMMERCIAL

## 2021-07-27 ENCOUNTER — TELEPHONE (OUTPATIENT)
Dept: FAMILY MEDICINE | Facility: CLINIC | Age: 66
End: 2021-07-27

## 2021-07-27 DIAGNOSIS — Z94.1 TRANSPLANTED HEART (H): Primary | ICD-10-CM

## 2021-07-27 DIAGNOSIS — Z94.1 TRANSPLANTED HEART (H): ICD-10-CM

## 2021-07-27 LAB
ALBUMIN SERPL-MCNC: 3 G/DL (ref 3.4–5)
ALP SERPL-CCNC: 516 U/L (ref 40–150)
ALT SERPL W P-5'-P-CCNC: 152 U/L (ref 0–70)
ANION GAP SERPL CALCULATED.3IONS-SCNC: 4 MMOL/L (ref 3–14)
AST SERPL W P-5'-P-CCNC: 41 U/L (ref 0–45)
BACTERIA SPEC CULT: NORMAL
BASOPHILS # BLD MANUAL: 0 10E3/UL (ref 0–0.2)
BASOPHILS NFR BLD MANUAL: 0 %
BILIRUB SERPL-MCNC: 0.4 MG/DL (ref 0.2–1.3)
BUN SERPL-MCNC: 16 MG/DL (ref 7–30)
BURR CELLS BLD QL SMEAR: SLIGHT
CALCIUM SERPL-MCNC: 8.6 MG/DL (ref 8.5–10.1)
CHLORIDE BLD-SCNC: 111 MMOL/L (ref 94–109)
CO2 SERPL-SCNC: 25 MMOL/L (ref 20–32)
CREAT SERPL-MCNC: 1.16 MG/DL (ref 0.66–1.25)
ELLIPTOCYTES BLD QL SMEAR: SLIGHT
EOSINOPHIL # BLD MANUAL: 0.1 10E3/UL (ref 0–0.7)
EOSINOPHIL NFR BLD MANUAL: 2 %
ERYTHROCYTE [DISTWIDTH] IN BLOOD BY AUTOMATED COUNT: 16.6 % (ref 10–15)
FUNGUS SPEC CULT: NORMAL
GFR SERPL CREATININE-BSD FRML MDRD: 66 ML/MIN/1.73M2
GLUCOSE BLD-MCNC: 102 MG/DL (ref 70–99)
HCT VFR BLD AUTO: 29.2 % (ref 40–53)
HGB BLD-MCNC: 8.6 G/DL (ref 13.3–17.7)
LYMPHOCYTES # BLD MANUAL: 1.5 10E3/UL (ref 0.8–5.3)
LYMPHOCYTES NFR BLD MANUAL: 32 %
Lab: NORMAL
MCH RBC QN AUTO: 27.7 PG (ref 26.5–33)
MCHC RBC AUTO-ENTMCNC: 29.5 G/DL (ref 31.5–36.5)
MCV RBC AUTO: 94 FL (ref 78–100)
MONOCYTES # BLD MANUAL: 0.5 10E3/UL (ref 0–1.3)
MONOCYTES NFR BLD MANUAL: 10 %
NEUTROPHILS # BLD MANUAL: 2.6 10E3/UL (ref 1.6–8.3)
NEUTROPHILS NFR BLD MANUAL: 56 %
PLAT MORPH BLD: ABNORMAL
PLATELET # BLD AUTO: 232 10E3/UL (ref 150–450)
POTASSIUM BLD-SCNC: 4.5 MMOL/L (ref 3.4–5.3)
PROT SERPL-MCNC: 6 G/DL (ref 6.8–8.8)
RBC # BLD AUTO: 3.1 10E6/UL (ref 4.4–5.9)
RBC MORPH BLD: ABNORMAL
SODIUM SERPL-SCNC: 140 MMOL/L (ref 133–144)
SPECIMEN SOURCE: NORMAL
SPECIMEN SOURCE: NORMAL
TACROLIMUS BLD-MCNC: 4.6 UG/L (ref 5–15)
TME LAST DOSE: ABNORMAL H
TME LAST DOSE: ABNORMAL H
WBC # BLD AUTO: 4.6 10E3/UL (ref 4–11)

## 2021-07-27 PROCEDURE — 85027 COMPLETE CBC AUTOMATED: CPT

## 2021-07-27 PROCEDURE — 80053 COMPREHEN METABOLIC PANEL: CPT

## 2021-07-27 PROCEDURE — 36415 COLL VENOUS BLD VENIPUNCTURE: CPT

## 2021-07-27 PROCEDURE — 80197 ASSAY OF TACROLIMUS: CPT

## 2021-07-27 NOTE — TELEPHONE ENCOUNTER
Cheyanne with [facility] calling needing verbal orders    Needing OT orders, to see 1x this week for reassessment, recertification is due next week for home care      Cheyanne given verbal orders for OT      Tammie RUIZN, RN

## 2021-07-28 ENCOUNTER — TELEPHONE (OUTPATIENT)
Dept: FAMILY MEDICINE | Facility: CLINIC | Age: 66
End: 2021-07-28

## 2021-07-28 DIAGNOSIS — Z94.1 S/P ORTHOTOPIC HEART TRANSPLANT (H): ICD-10-CM

## 2021-07-28 RX ORDER — TACROLIMUS 1 MG/1
CAPSULE ORAL
Qty: 900 CAPSULE | Refills: 11 | COMMUNITY
Start: 2021-07-28 | End: 2021-08-04

## 2021-07-28 NOTE — TELEPHONE ENCOUNTER
Form received from [facility].  Placed on Dr. Gómez's desk for signature.

## 2021-07-29 ENCOUNTER — TELEPHONE (OUTPATIENT)
Dept: FAMILY MEDICINE | Facility: CLINIC | Age: 66
End: 2021-07-29

## 2021-07-29 NOTE — TELEPHONE ENCOUNTER
Form received from [facility].  Placed on Dr. Gómez's desk for signature.

## 2021-08-02 ENCOUNTER — LAB REQUISITION (OUTPATIENT)
Dept: LAB | Facility: CLINIC | Age: 66
End: 2021-08-02
Payer: COMMERCIAL

## 2021-08-02 ENCOUNTER — MEDICAL CORRESPONDENCE (OUTPATIENT)
Dept: HEALTH INFORMATION MANAGEMENT | Facility: CLINIC | Age: 66
End: 2021-08-02

## 2021-08-02 DIAGNOSIS — Z94.1 HEART TRANSPLANT STATUS (H): ICD-10-CM

## 2021-08-02 DIAGNOSIS — Z79.899 OTHER LONG TERM (CURRENT) DRUG THERAPY: ICD-10-CM

## 2021-08-02 LAB
ANION GAP SERPL CALCULATED.3IONS-SCNC: 10 MMOL/L (ref 3–14)
BUN SERPL-MCNC: 22 MG/DL (ref 7–30)
CALCIUM SERPL-MCNC: 8.1 MG/DL (ref 8.5–10.1)
CHLORIDE BLD-SCNC: 105 MMOL/L (ref 94–109)
CO2 SERPL-SCNC: 24 MMOL/L (ref 20–32)
CREAT SERPL-MCNC: 1.11 MG/DL (ref 0.66–1.25)
ERYTHROCYTE [DISTWIDTH] IN BLOOD BY AUTOMATED COUNT: 16.8 % (ref 10–15)
GFR SERPL CREATININE-BSD FRML MDRD: 70 ML/MIN/1.73M2
GLUCOSE BLD-MCNC: 71 MG/DL (ref 70–99)
HCT VFR BLD AUTO: 30.9 % (ref 40–53)
HGB BLD-MCNC: 9.2 G/DL (ref 13.3–17.7)
MCH RBC QN AUTO: 27.9 PG (ref 26.5–33)
MCHC RBC AUTO-ENTMCNC: 29.8 G/DL (ref 31.5–36.5)
MCV RBC AUTO: 94 FL (ref 78–100)
PLATELET # BLD AUTO: 293 10E3/UL (ref 150–450)
POTASSIUM BLD-SCNC: 4 MMOL/L (ref 3.4–5.3)
RBC # BLD AUTO: 3.3 10E6/UL (ref 4.4–5.9)
SODIUM SERPL-SCNC: 139 MMOL/L (ref 133–144)
TACROLIMUS BLD-MCNC: 5.1 UG/L (ref 5–15)
TME LAST DOSE: NORMAL H
TME LAST DOSE: NORMAL H
WBC # BLD AUTO: 4.2 10E3/UL (ref 4–11)

## 2021-08-02 PROCEDURE — 36415 COLL VENOUS BLD VENIPUNCTURE: CPT | Mod: ORL | Performed by: INTERNAL MEDICINE

## 2021-08-02 PROCEDURE — 80197 ASSAY OF TACROLIMUS: CPT | Mod: ORL | Performed by: INTERNAL MEDICINE

## 2021-08-02 PROCEDURE — 85027 COMPLETE CBC AUTOMATED: CPT | Mod: ORL | Performed by: INTERNAL MEDICINE

## 2021-08-02 PROCEDURE — 80048 BASIC METABOLIC PNL TOTAL CA: CPT | Mod: ORL | Performed by: INTERNAL MEDICINE

## 2021-08-03 ENCOUNTER — TELEPHONE (OUTPATIENT)
Dept: FAMILY MEDICINE | Facility: CLINIC | Age: 66
End: 2021-08-03

## 2021-08-03 ENCOUNTER — LAB (OUTPATIENT)
Dept: LAB | Facility: CLINIC | Age: 66
End: 2021-08-03
Attending: INTERNAL MEDICINE
Payer: COMMERCIAL

## 2021-08-03 ENCOUNTER — TELEPHONE (OUTPATIENT)
Dept: TRANSPLANT | Facility: CLINIC | Age: 66
End: 2021-08-03

## 2021-08-03 DIAGNOSIS — Z11.59 ENCOUNTER FOR SCREENING FOR OTHER VIRAL DISEASES: ICD-10-CM

## 2021-08-03 PROCEDURE — U0005 INFEC AGEN DETEC AMPLI PROBE: HCPCS

## 2021-08-03 PROCEDURE — U0003 INFECTIOUS AGENT DETECTION BY NUCLEIC ACID (DNA OR RNA); SEVERE ACUTE RESPIRATORY SYNDROME CORONAVIRUS 2 (SARS-COV-2) (CORONAVIRUS DISEASE [COVID-19]), AMPLIFIED PROBE TECHNIQUE, MAKING USE OF HIGH THROUGHPUT TECHNOLOGIES AS DESCRIBED BY CMS-2020-01-R: HCPCS

## 2021-08-03 NOTE — TELEPHONE ENCOUNTER
Routed to provider to review and advise.    REGINALD Gilbert, from [facility] calling requesting the following verbal orders for pt:    1. Skilled Nursing 1 x per week for 8 weeks for weekly transplant lab draws.    2. PT evaluation.    3. OT evaluation completed - requesting ongoing OT 2 x per week for 2 weeks, followed by 1 x per week for 5 weeks.    JOSEPH FlowerN, RN

## 2021-08-03 NOTE — TELEPHONE ENCOUNTER
Provider Call: Home Care  [facility] [phone]  Wanted to discuss care plan and needs order for PT  Callback needed? Yes    Return Call Needed  Same as documented in contacts section  When to return call?: Same day: [facility]

## 2021-08-04 ENCOUNTER — TELEPHONE (OUTPATIENT)
Dept: CARDIOLOGY | Facility: CLINIC | Age: 66
End: 2021-08-04

## 2021-08-04 DIAGNOSIS — Z94.1 TRANSPLANTED HEART (H): Primary | ICD-10-CM

## 2021-08-04 LAB — SARS-COV-2 RNA RESP QL NAA+PROBE: NEGATIVE

## 2021-08-04 RX ORDER — LIDOCAINE 40 MG/G
CREAM TOPICAL
Status: CANCELLED | OUTPATIENT
Start: 2021-08-04

## 2021-08-04 RX ORDER — TACROLIMUS 1 MG/1
CAPSULE ORAL
Qty: 900 CAPSULE | Refills: 11 | Status: SHIPPED | OUTPATIENT
Start: 2021-08-04 | End: 2021-08-10

## 2021-08-04 NOTE — NURSING NOTE
Transplant Coordinator Note    Reason for visit: 3 month follow up  Coordinator: Present     Health concerns addressed today:  1. Subtherapeutic tac levels??  2.  Bump Left lower ribcage. Pushed on it and it disappeared? Hernia.   3. Chest pains in different areas. Muscular skeletal pains.   4. Repeat CT on 9/2 to evaluate Moderate Right exudative effusion and RLL Cavitary lesion pleural effusion.   5. Reoccurring gout. Today on left hand. Previously in foot and knee.  ~Anakinra 100 mg times two inpatient with improvement in symptoms. Allopurinol 300 mg po daily at discharge given improved renal function. Message sent to Rheumatology.   6. Discuss baseline angio at next appointments with Dr. Montgomery.       Serostatus: CMV: D+/R+, EBV: D+/R+, Toxo D-/R-  Prophylaxis: Nystatin, Pentamidine (7/12), and Valcyte.       Immunosuppressants:  tacro goal 8-10 (decreased early due to infections) Current dose 6 mg twice a day.    mg bid.   Prednisone 10 mg daily.       Resulted labs, right heart catheterization, and echo reviewed with patient. Medication record reviewed and reconciled  Questions and concerns addressed  Pt verbalized an understanding of plan of care.     Patient Instructions  1. If heart biopsy is negative, will decrease prednisone to 5 mg daily.   2. Stop Nystatin and start troches four times a day. Repeat tacro level on Saturday.   3. I will set up a Pentamidine neb and let you know.   4. Please let us know if you experience any pain with your hernia.   5. I will let you know what rheumatology says about Anakinra to help treat your reoccurring gout episodes.   6. Please wear your compression stockings if you have them. Also, elevate your feet when able.   7. I will talk with Dr. Montgomery and infectious disease to see if you can get covid vaccines now.     Next transplant clinic appointment: 4 months on 9/2 w/ labs, clinic with Dr. Montgomery, CT of chest, and rhc/hbx.   Next lab draw: Saturday 8/7.    Coordinator will call with all pending results.     Please call transplant coordinator with any questions:    Yolette Rueda RN BSN   Post Heart Transplant Nurse Coordinator  [facility]  Questions: [phone]

## 2021-08-05 ENCOUNTER — APPOINTMENT (OUTPATIENT)
Dept: MEDSURG UNIT | Facility: CLINIC | Age: 66
End: 2021-08-05
Attending: INTERNAL MEDICINE
Payer: COMMERCIAL

## 2021-08-05 ENCOUNTER — LAB (OUTPATIENT)
Dept: LAB | Facility: CLINIC | Age: 66
End: 2021-08-05
Attending: INTERNAL MEDICINE
Payer: COMMERCIAL

## 2021-08-05 ENCOUNTER — HOSPITAL ENCOUNTER (OUTPATIENT)
Facility: CLINIC | Age: 66
Discharge: HOME | End: 2021-08-05
Attending: INTERNAL MEDICINE | Admitting: INTERNAL MEDICINE
Payer: COMMERCIAL

## 2021-08-05 ENCOUNTER — HOSPITAL ENCOUNTER (OUTPATIENT)
Dept: CARDIOLOGY | Facility: CLINIC | Age: 66
End: 2021-08-05
Attending: INTERNAL MEDICINE | Admitting: INTERNAL MEDICINE
Payer: COMMERCIAL

## 2021-08-05 ENCOUNTER — OFFICE VISIT (OUTPATIENT)
Dept: CARDIOLOGY | Facility: CLINIC | Age: 66
End: 2021-08-05
Attending: INTERNAL MEDICINE
Payer: COMMERCIAL

## 2021-08-05 VITALS
HEART RATE: 118 BPM | BODY MASS INDEX: 30.56 KG/M2 | DIASTOLIC BLOOD PRESSURE: 75 MMHG | WEIGHT: 206.3 LBS | HEIGHT: 69 IN | OXYGEN SATURATION: 99 % | SYSTOLIC BLOOD PRESSURE: 118 MMHG

## 2021-08-05 VITALS
RESPIRATION RATE: 18 BRPM | HEART RATE: 111 BPM | OXYGEN SATURATION: 98 % | DIASTOLIC BLOOD PRESSURE: 101 MMHG | SYSTOLIC BLOOD PRESSURE: 135 MMHG | TEMPERATURE: 97.6 F

## 2021-08-05 DIAGNOSIS — Z94.1 TRANSPLANTED HEART (H): ICD-10-CM

## 2021-08-05 DIAGNOSIS — Z94.1 HEART REPLACED BY TRANSPLANT (H): Primary | ICD-10-CM

## 2021-08-05 DIAGNOSIS — M1A.0420 CHRONIC GOUT OF LEFT HAND, UNSPECIFIED CAUSE: ICD-10-CM

## 2021-08-05 DIAGNOSIS — Z94.1 S/P ORTHOTOPIC HEART TRANSPLANT (H): Primary | ICD-10-CM

## 2021-08-05 DIAGNOSIS — D50.9 IRON DEFICIENCY ANEMIA, UNSPECIFIED IRON DEFICIENCY ANEMIA TYPE: ICD-10-CM

## 2021-08-05 DIAGNOSIS — Z94.1 S/P ORTHOTOPIC HEART TRANSPLANT (H): ICD-10-CM

## 2021-08-05 DIAGNOSIS — E11.40 TYPE 2 DIABETES MELLITUS WITH DIABETIC NEUROPATHY, WITHOUT LONG-TERM CURRENT USE OF INSULIN (H): ICD-10-CM

## 2021-08-05 DIAGNOSIS — M62.81 MUSCLE WEAKNESS (GENERALIZED): ICD-10-CM

## 2021-08-05 DIAGNOSIS — N18.30 STAGE 3 CHRONIC KIDNEY DISEASE, UNSPECIFIED WHETHER STAGE 3A OR 3B CKD (H): ICD-10-CM

## 2021-08-05 DIAGNOSIS — I31.39 PERICARDIAL EFFUSION: ICD-10-CM

## 2021-08-05 DIAGNOSIS — J44.9 CHRONIC OBSTRUCTIVE PULMONARY DISEASE, UNSPECIFIED COPD TYPE (H): ICD-10-CM

## 2021-08-05 DIAGNOSIS — D84.9 IMMUNOSUPPRESSION (H): ICD-10-CM

## 2021-08-05 LAB
ALBUMIN SERPL-MCNC: 2.9 G/DL (ref 3.4–5)
ALP SERPL-CCNC: 281 U/L (ref 40–150)
ALT SERPL W P-5'-P-CCNC: 36 U/L (ref 0–70)
ANION GAP SERPL CALCULATED.3IONS-SCNC: 6 MMOL/L (ref 3–14)
AST SERPL W P-5'-P-CCNC: 12 U/L (ref 0–45)
BASOPHILS # BLD MANUAL: 0.2 10E3/UL (ref 0–0.2)
BASOPHILS NFR BLD MANUAL: 4 %
BILIRUB SERPL-MCNC: 0.5 MG/DL (ref 0.2–1.3)
BUN SERPL-MCNC: 27 MG/DL (ref 7–30)
BURR CELLS BLD QL SMEAR: SLIGHT
CALCIUM SERPL-MCNC: 8.2 MG/DL (ref 8.5–10.1)
CHLORIDE BLD-SCNC: 107 MMOL/L (ref 94–109)
CO2 SERPL-SCNC: 24 MMOL/L (ref 20–32)
CREAT SERPL-MCNC: 1.45 MG/DL (ref 0.66–1.25)
EOSINOPHIL # BLD MANUAL: 0.1 10E3/UL (ref 0–0.7)
EOSINOPHIL NFR BLD MANUAL: 2 %
ERYTHROCYTE [DISTWIDTH] IN BLOOD BY AUTOMATED COUNT: 16.7 % (ref 10–15)
FERRITIN SERPL-MCNC: 98 NG/ML (ref 26–388)
FRAGMENTS BLD QL SMEAR: SLIGHT
GFR SERPL CREATININE-BSD FRML MDRD: 51 ML/MIN/1.73M2
GLUCOSE BLD-MCNC: 85 MG/DL (ref 70–99)
HCT VFR BLD AUTO: 31.4 % (ref 40–53)
HGB BLD-MCNC: 9 G/DL (ref 13.3–17.7)
HGB BLD-MCNC: 9.2 G/DL (ref 13.3–17.7)
IRON SATN MFR SERPL: 13 % (ref 15–46)
IRON SERPL-MCNC: 30 UG/DL (ref 35–180)
LVEF ECHO: NORMAL
LYMPHOCYTES # BLD MANUAL: 1.2 10E3/UL (ref 0.8–5.3)
LYMPHOCYTES NFR BLD MANUAL: 28 %
MAGNESIUM SERPL-MCNC: 1.8 MG/DL (ref 1.6–2.3)
MCH RBC QN AUTO: 27.9 PG (ref 26.5–33)
MCHC RBC AUTO-ENTMCNC: 29.3 G/DL (ref 31.5–36.5)
MCV RBC AUTO: 95 FL (ref 78–100)
MONOCYTES # BLD MANUAL: 0.2 10E3/UL (ref 0–1.3)
MONOCYTES NFR BLD MANUAL: 4 %
MYELOCYTES # BLD MANUAL: 0 10E3/UL
MYELOCYTES NFR BLD MANUAL: 1 %
NEUTROPHILS # BLD MANUAL: 2.6 10E3/UL (ref 1.6–8.3)
NEUTROPHILS NFR BLD MANUAL: 61 %
OXYHGB MFR BLDV: 64 % (ref 92–100)
PHOSPHATE SERPL-MCNC: 2.4 MG/DL (ref 2.5–4.5)
PLAT MORPH BLD: ABNORMAL
PLATELET # BLD AUTO: 280 10E3/UL (ref 150–450)
POTASSIUM BLD-SCNC: 4.3 MMOL/L (ref 3.4–5.3)
PROT SERPL-MCNC: 6.4 G/DL (ref 6.8–8.8)
RBC # BLD AUTO: 3.3 10E6/UL (ref 4.4–5.9)
RBC MORPH BLD: ABNORMAL
SODIUM SERPL-SCNC: 137 MMOL/L (ref 133–144)
TACROLIMUS BLD-MCNC: 5.8 UG/L (ref 5–15)
TIBC SERPL-MCNC: 240 UG/DL (ref 240–430)
TME LAST DOSE: NORMAL H
TME LAST DOSE: NORMAL H
WBC # BLD AUTO: 4.2 10E3/UL (ref 4–11)

## 2021-08-05 PROCEDURE — 86833 HLA CLASS II HIGH DEFIN QUAL: CPT

## 2021-08-05 PROCEDURE — 85018 HEMOGLOBIN: CPT

## 2021-08-05 PROCEDURE — 88307 TISSUE EXAM BY PATHOLOGIST: CPT | Mod: 26 | Performed by: PATHOLOGY

## 2021-08-05 PROCEDURE — 84100 ASSAY OF PHOSPHORUS: CPT

## 2021-08-05 PROCEDURE — 84238 ASSAY NONENDOCRINE RECEPTOR: CPT

## 2021-08-05 PROCEDURE — 82810 BLOOD GASES O2 SAT ONLY: CPT

## 2021-08-05 PROCEDURE — 88346 IMFLUOR 1ST 1ANTB STAIN PX: CPT | Mod: 26 | Performed by: PATHOLOGY

## 2021-08-05 PROCEDURE — 250N000009 HC RX 250: Performed by: INTERNAL MEDICINE

## 2021-08-05 PROCEDURE — 99214 OFFICE O/P EST MOD 30 MIN: CPT | Mod: 25 | Performed by: NURSE PRACTITIONER

## 2021-08-05 PROCEDURE — 93505 ENDOMYOCARDIAL BIOPSY: CPT | Performed by: INTERNAL MEDICINE

## 2021-08-05 PROCEDURE — 83550 IRON BINDING TEST: CPT

## 2021-08-05 PROCEDURE — C1894 INTRO/SHEATH, NON-LASER: HCPCS | Performed by: INTERNAL MEDICINE

## 2021-08-05 PROCEDURE — 87799 DETECT AGENT NOS DNA QUANT: CPT

## 2021-08-05 PROCEDURE — 85027 COMPLETE CBC AUTOMATED: CPT

## 2021-08-05 PROCEDURE — 82728 ASSAY OF FERRITIN: CPT

## 2021-08-05 PROCEDURE — 93306 TTE W/DOPPLER COMPLETE: CPT | Mod: 26 | Performed by: INTERNAL MEDICINE

## 2021-08-05 PROCEDURE — 83735 ASSAY OF MAGNESIUM: CPT

## 2021-08-05 PROCEDURE — 999N000132 HC STATISTIC PP CARE STAGE 1

## 2021-08-05 PROCEDURE — 36415 COLL VENOUS BLD VENIPUNCTURE: CPT

## 2021-08-05 PROCEDURE — 88350 IMFLUOR EA ADDL 1ANTB STN PX: CPT | Mod: 26 | Performed by: PATHOLOGY

## 2021-08-05 PROCEDURE — 88346 IMFLUOR 1ST 1ANTB STAIN PX: CPT | Mod: TC | Performed by: INTERNAL MEDICINE

## 2021-08-05 PROCEDURE — G0463 HOSPITAL OUTPT CLINIC VISIT: HCPCS

## 2021-08-05 PROCEDURE — 80197 ASSAY OF TACROLIMUS: CPT

## 2021-08-05 PROCEDURE — 272N000001 HC OR GENERAL SUPPLY STERILE: Performed by: INTERNAL MEDICINE

## 2021-08-05 PROCEDURE — 93306 TTE W/DOPPLER COMPLETE: CPT

## 2021-08-05 PROCEDURE — 86832 HLA CLASS I HIGH DEFIN QUAL: CPT

## 2021-08-05 PROCEDURE — 86352 CELL FUNCTION ASSAY W/STIM: CPT

## 2021-08-05 PROCEDURE — 250N000009 HC RX 250: Performed by: NURSE PRACTITIONER

## 2021-08-05 PROCEDURE — 80053 COMPREHEN METABOLIC PANEL: CPT

## 2021-08-05 RX ORDER — CLOTRIMAZOLE 10 MG/1
10 LOZENGE ORAL 4 TIMES DAILY
Qty: 360 TROCHE | Refills: 3 | Status: ON HOLD | OUTPATIENT
Start: 2021-08-05 | End: 2021-09-02

## 2021-08-05 RX ORDER — PREDNISONE 10 MG/1
10 TABLET ORAL EVERY MORNING
Qty: 90 TABLET | Refills: 0 | COMMUNITY
Start: 2021-08-05 | End: 2021-08-06

## 2021-08-05 RX ORDER — LIDOCAINE 40 MG/G
CREAM TOPICAL
Status: COMPLETED | OUTPATIENT
Start: 2021-08-05 | End: 2021-08-05

## 2021-08-05 RX ADMIN — LIDOCAINE: 40 CREAM TOPICAL at 11:45

## 2021-08-05 ASSESSMENT — MIFFLIN-ST. JEOR: SCORE: 1713.27

## 2021-08-05 ASSESSMENT — PAIN SCALES - GENERAL: PAINLEVEL: NO PAIN (0)

## 2021-08-05 NOTE — NURSING NOTE
Chief Complaint   Patient presents with     Follow Up     return heart tx 10 week     Vitals were taken and medications reconciled.    Pablo Mitchell, EMT  3:21 PM

## 2021-08-05 NOTE — PROGRESS NOTES
Patient returned to 2A post RHC and biopsy.  VSS from baseline.  Denies pain.  Right neck site C/D/I, no hematoma.  PO food and fluids at bedside.  Discharge teaching done by prep RN - patient with no further questions.  Patient appropriate for immediate discharge.  Patient will call for wheelchair ride to lobby once dressed and ready to go.

## 2021-08-05 NOTE — PROGRESS NOTES
[facility]    HPI:   Mr. Willingham is a 66 year old male who presents to clinic today for routine heart transplant follow-up. Patient has history of NICM s/p OHT (5/6/21) postoperative course was complicated by right pleural air leak with prolonged chest tube, paroxysmal atrial fibrillation, COPD, CKDIII, DMII, anemia. He was recently admitted on 6/28/21 for sepsis secondary to community-acquired RLL pneumonia. A diagnostic and therapeutic thoracentesis completed on 6/29 with 270ml removed. Pleural fluid was exudative and culture was negative. He was readmitted 7/8-7/19/21 for ongoing weakness and gout pain. Found to have recurrent R exudative pleural effusion and underwent another diagnostic thoracentesis c/w exudate. TG negative for chylothorax. Aerobic, fungal, anaerobic pleural cx from 7/10 and 7/11 negative. Acid Fast and Mycobacterial stains and culture from 7/11 negative. Repeat Chest CT 7/14 consistent with RLL cavitary lesion and KALI nodules x3. BD glucan indeterminate at 67. Aspergillus negative. Also found fewly identified pericardial effusion 7/12/21 1.4 cm in diameter per echo. Chest CT 7/13 consistent with anterior mediastinal fluid tracking. Plan is to monitor. Lastly, developed transaminitis with questionable choledocholithiasis. Abdominal US consistent with hepatomegaly with steatosis, CBD 2 mm, and patent vasculature. Hepatology consulted and plan is to monitor. His left MCP gout flare was treated with Anakinra.     Since most recent hospital discharge patient reports feeling well.  His gout pain has resolved.  He endorses occasional fleeting chest pains when bending forward but lasting no more than a few seconds and this has been present for some time/unchanged.  Denies any exertional chest pain or shortness of breath.  He also has some shoulder pain which is positional as well.  He has been working on arm exercises given to him by rehab.  Endorses mild lower extreme edema but  denies orthopnea, PND, significant shortness of breath.  He reports a good appetite.  Blood pressures are 110 over 70s 80s.  Patient denies recent fever, chills, cough, night sweats, oral lesions, rashes, nausea, vomiting, diarrhea.      PAST MEDICAL HISTORY:  Past Medical History:   Diagnosis Date     Bariatric surgery status 2003     Benign essential hypertension 05/11/2017     Bilateral carpal tunnel syndrome 11/02/2020     Cardiomyopathy, unspecified (H) 05/08/2017     CKD (chronic kidney disease) stage 3, GFR 30-59 ml/min 05/11/2017     COPD (chronic obstructive pulmonary disease) (H) 11/02/2020     Depression 05/11/2017     Diabetes mellitus (H) 1995     Gouty arthropathy, chronic, without tophi 11/02/2020     H/O gastric bypass 05/11/2017     ICD (implantable cardioverter-defibrillator), biventricular, in situ 05/11/2017     LVAD (left ventricular assist device) present (H)      Major depression, recurrent, chronic (H) 11/02/2020     NICM (nonischemic cardiomyopathy) (H)/ EF 20% 05/11/2017    ECHO: LVEDd. 7.66 cm, Restrictive pattern , Severe mitral valve regurgitation     CECILIA (obstructive sleep apnea) 05/11/2017     Paroxysmal atrial fibrillation (H) 05/11/2017     Paroxysmal VT (H) 05/11/2017     Rotator cuff tear arthropathy of both shoulders 11/02/2020     Uncomplicated asthma      Vitamin B12 deficiency (non anemic) 05/11/2017       FAMILY HISTORY:  Family History   Problem Relation Age of Onset     Cerebrovascular Disease Mother 64     Diabetes Mother      Hypertension Mother      Coronary Artery Disease Father      Diabetes Type 2  Father      Obesity Brother      Obesity Brother      Cerebrovascular Disease Daughter 40       SOCIAL HISTORY:  Socioeconomic History     Marital status:      Spouse name: None     Number of children: None     Years of education: None     Highest education level: None   Occupational History     None   Tobacco Use     Smoking status: Former Smoker     Quit date: 1995  "    Years since quittin.6     Smokeless tobacco: Never Used   Substance and Sexual Activity     Alcohol use: No     Drug use: No     Sexual activity: Yes     Partners: Female   Other Topics Concern     Parent/sibling w/ CABG, MI or angioplasty before 65F 55M? No   Social History Narrative     None     Financial Resource Strain:      Difficulty of Paying Living Expenses:    Food Insecurity:      Worried About Running Out of Food in the Last Year:      Ran Out of Food in the Last Year:    Transportation Needs:      Lack of Transportation (Medical):      Lack of Transportation (Non-Medical):    Physical Activity:      Days of Exercise per Week:      Minutes of Exercise per Session:    Stress:      Feeling of Stress :    Social Connections:      Frequency of Communication with Friends and Family:      Frequency of Social Gatherings with Friends and Family:      Attends [employer] Services:      Active Member of Clubs or Organizations:      Attends Club or Organization Meetings:      Marital Status:    Intimate Partner Violence:      Fear of Current or Ex-Partner:      Emotionally Abused:      Physically Abused:      Sexually Abused:        CURRENT MEDICATIONS:  See below     ROS:  See HPI    EXAM:  /75 (BP Location: Right arm, Patient Position: Chair, Cuff Size: Adult Regular)   Pulse 118   Ht 1.748 m (5' 8.82\")   Wt 93.6 kg (206 lb 4.8 oz)   SpO2 99%   BMI 30.63 kg/m      GENERAL: Appears comfortable, in no acute distress.   HEENT: Eye symmetrical, no discharge or icterus bilaterally. Mucous membranes moist and without lesions. No thrush.   CV: Tachycardic and regular, +S1S2, no murmur, rub, or gallop. JVP at clavicle.   RESPIRATORY: Respirations regular, even, and unlabored. Lungs CTA throughout.   GI: Soft, obese and non distended with normoactive bowel sounds present in all quadrants. No tenderness, rebound, guarding. No hepatomegaly.   EXTREMITIES: Trace BLE peripheral edema. 2+ bilateral pedal " pulses.   NEUROLOGIC: Alert and oriented x 3. No focal deficits.   MUSCULOSKELETAL: No joint swelling or tenderness.   SKIN: No jaundice. No rashes or lesions.     Labs - reviewed with patient in clinic today:  CBC RESULTS:  Lab Results   Component Value Date    WBC 4.2 08/05/2021    WBC 7.8 07/11/2021    RBC 3.30 (L) 08/05/2021    RBC 3.06 (L) 07/11/2021    HGB 9.0 (L) 08/05/2021    HGB 9.2 (L) 08/05/2021    HGB 8.7 (L) 07/11/2021    HCT 31.4 (L) 08/05/2021    HCT 28.4 (L) 07/11/2021    MCV 95 08/05/2021    MCV 93 07/11/2021    MCH 27.9 08/05/2021    MCH 28.4 07/11/2021    MCHC 29.3 (L) 08/05/2021    MCHC 30.6 (L) 07/11/2021    RDW 16.7 (H) 08/05/2021    RDW 15.6 (H) 07/11/2021     08/05/2021     07/11/2021       CMP RESULTS:  Lab Results   Component Value Date     08/05/2021     (L) 07/11/2021    POTASSIUM 4.3 08/05/2021    POTASSIUM 5.1 07/11/2021    CHLORIDE 107 08/05/2021    CHLORIDE 104 07/11/2021    CO2 24 08/05/2021    CO2 23 07/11/2021    ANIONGAP 6 08/05/2021    ANIONGAP 6 07/11/2021    GLC 85 08/05/2021     (H) 07/11/2021    BUN 27 08/05/2021    BUN 49 (H) 07/11/2021    CR 1.45 (H) 08/05/2021    CR 2.75 (H) 07/11/2021    GFRESTIMATED 51 (L) 08/05/2021    GFRESTIMATED 23 (L) 07/11/2021    GFRESTBLACK 27 (L) 07/11/2021    QAMAR 8.2 (L) 08/05/2021    QAMAR 7.9 (L) 07/11/2021    BILITOTAL 0.5 08/05/2021    BILITOTAL 0.3 07/08/2021    ALBUMIN 2.9 (L) 08/05/2021    ALBUMIN 2.6 (L) 07/08/2021    ALKPHOS 281 (H) 08/05/2021    ALKPHOS 292 (H) 07/08/2021    ALT 36 08/05/2021    ALT 55 07/08/2021    AST 12 08/05/2021    AST Canceled, Test credited 07/08/2021        INR RESULTS:  Lab Results   Component Value Date    INR 1.16 (H) 07/19/2021    INR 1.06 07/10/2021       LIPID RESULTS:  Lab Results   Component Value Date    CHOL 209 (H) 11/05/2020    HDL 87 11/05/2020     (H) 11/05/2020    TRIG 91 11/05/2020       IMMUNOSUPPRESSANT LEVELS:  Lab Results   Component Value Date     TACROL 5.8 08/05/2021    TACROL 11.4 07/11/2021    DOSTAC  08/05/2021      Comment:      Last dose information not provided.    DOSTAC Not Provided 07/11/2021       No components found for: CK  Lab Results   Component Value Date    MAG 1.8 08/05/2021    MAG 1.8 07/11/2021     Lab Results   Component Value Date    A1C 5.1 05/04/2021     Lab Results   Component Value Date    PHOS 2.4 (L) 08/05/2021    PHOS 2.8 06/30/2021     Lab Results   Component Value Date    NTBNP 866 (H) 07/31/2019     Lab Results   Component Value Date    SAITESTMET SA [facility] 06/07/2021    SAICELL Class I 06/07/2021    [id number] Cw:12 06/07/2021    [id number] None 06/07/2021    SAIREPCOM  06/07/2021      Test performed by modified procedure. Serum heat inactivated and tested   by a modified (Check) protocol including fetal calf serum addition.   High-risk, mfi >3,000. Mod-risk, mfi 500-3,000.       Lab Results   Component Value Date    SAIITESTME [facility] 06/07/2021    SAIICELL Class II 06/07/2021    [id number] None 06/07/2021    [id number] None 06/07/2021    SAIIREPCOM  06/07/2021      Test performed by modified procedure. Serum heat inactivated and tested   by a modified (Check) protocol including fetal calf serum addition.   High-risk, mfi >3,000. Mod-risk, mfi 500-3,000.       Lab Results   Component Value Date    CSPEC Plasma 06/29/2021       Diagnostic Studies:  RHC today  RA - --/--/10  RV - 34/10  PA - 35/15/25  PCWP - --/--/14  PA Sat - 64.4%  Hgb - 9.0  Kee CO/CI - 6/2.95  TD CO/CI - 6.27/3.08  PVR - 1.83 KWOK   SVR - 1240 dynes    TTE 7/23/21  Status post orthotopic heart transplant on 05/06/2021.  Global and regional left ventricular function is hyperkinetic with an EF of  65-70%.  Global right ventricular function is normal. The right ventricle is normal  size.  No significant valvular abnormalities. There is only trace tricuspid  regurgitation.  A small, loculated pericardial effusion is still seen near the RV apex.  This study was  compared with the study from 07/12/2021. The pericardial  effusion that was anterior to the RV is smaller in size, but still present.  The RA pressure is slightly higher. No change in the biventricular function.    Assessment/Plan:  Mr. Willingham is a 66 year old male who is s/p orthotopic heart transplant on 5/6/21 who presents to clinic for routine follow up and hospital follow-up . Patient has history of NICM, COPD, CKD, gout, DM2. Recent post transplant course c/b CAP and exudative left pleural effusion s/p thoracentesis x2 and recurrent gout flares.    # Status post OHT on 5/6/21, history of NICM  # Chronic immunosuppression  Complicated by right pleural air leak and prolonged CT, Afib, and CAP with cavitary lesion as above. RHC 7/16 with mRA-7, mPCW-11, MICHELLE CO-6.41, and MICHELLE CI-3.19. Echo 7/12 with EF 60-65%, normal RV function, and pericardial effusion without evidence of tamponade, and normal IVC. No history of rejection or DSAs.     Immunosuppression:  - Prednisone taper per protocol with negative bx  - Tacrolimus, trough level goal 8-10, has been low for awhile - change nystatin to clotrimazole as below and monitor closely.   - Cellcept 1500 mg bid    Prophylaxis:  - Thrush:change Nystatin to clotrimazole to help boost tacro levels  - PCP: due for pentamidine, may be able to use Bactrim next month if renal function stable  - PPI: pantoprazole   - CAV: ASA 81 mg and statin on hold w elevated LFTs, start when LFTs stable  - Osteoporosis: calcium and vitamin D  - CMV: Valcyte x 3 months - ok to stop per protocol    Serostatus:CMV: D+/R+, EBV: D+/R+, Toxo D-/R-    # Moderate right exudative effusion. RLL Cavitary lesion. Diagnostic thoracentesis 7/10 consistent with exudate, TG negative for chylothorax. Aerobic, fungal, anaerobic pleural cx from 7/10 and 7/11 negative. Acid Fast and Mycobacterial stains and culture from 7/11 negative. Repeat Chest CT 7/14 consistent with RLL cavitary lesion and KALI nodules x3. BD  glucan indeterminate at 67. Aspergillus negative. Transplant ID following.      # Mediastinal fluid collection. Pericardial effusion. Noted 7/12/21 1.4 cm in diameter. Chest CT 7/13 consistent with anterior mediastinal fluid tracking. Transplant ID and CVTS consulted last admission. Last RHC near normal filling pressures. Monitor with serial echo - last 7/23 showed smaller effusion.      # CKD. Cr improving, 1.4 today.      # MICHAEL. Iron studies low. Hgb ~9. Start ferrous sulfate.     # Severe protein calorie malnutrition. History of Sylvester En Y. Albumin 2.9 Referral to nutrition prn.      # Transaminitis, resolved, with questionable Choledocholithiasis. Abdominal US consistent with hepatomegaly with steatosis, CBD 2 mm, and patent vasculature. Trend per protocol.      # Left MCP Pseudogout, acute flare, resolved. Gout ongoing issues despite being on prednisone. Relieved with repeat courses of Anakinra. Will reach out to rheumatology to see if this is an option as an outpatient. Continue allopurinol for now.      # DM Type II, controlled. Follows with endocrine.  # Generalized weakness. Outpatient therapies.  # Depression. He is on wellbutrin 75 BID.  # COPD. He is on singulair, Trelegy Ellipta, Albuterol.            25 minutes spent face-to-face with patient, >50% in counseling and/or coordination of care as described above      Kiesha Wilder DNP, NP-C  8/5/2021          TOMMY SCHULER

## 2021-08-05 NOTE — DISCHARGE INSTRUCTIONS
[facility]                        Interventional Cardiology  Discharge Instructions   Post Right Heart Cath      AFTER YOU GO HOME:    DO drink plenty of fluids    DO resume your regular diet and medications unless otherwise instructed by your Primary Physician    Do Not scrub the procedure site vigorously    No lotion or powder to the puncture site for 3 days    CALL YOUR PRIMARY PHYSICIAN IF: You may resume all normal activity.  Monitor neck site for bleeding, swelling, or voice changes. If you notice bleeding or swelling immediately apply pressure to the site and call number below to speak with Cardiology Fellow.  If you experience any changes in your breathing you should call your doctor immediately or come to the closest Emergency Department.  Do not drive yourself.    ADDITIONAL INSTRUCTIONS: Medications: You are to resume all home medications including anticoagulation therapy unless otherwise advised by your primary cardiologist or nurse coordinator.    Follow Up: Per your primary cardiology team    If you have any questions or concerns regarding your procedure site please call [phone] at anytime and ask for Cardiology Fellow on call.  They are available 24 hours a day.  You may also contact the Cardiology Clinic after hours number at [phone].                                                       Telephone Numbers [phone] Monday-Friday 8:00 am to 4:30 pm    [phone] After 4:30 pm Monday-Friday, Weekends & Holidays  Ask for Interventional Cardiologist on call. Someone is on call 24 hours/day   [facility] toll free number [phone] Monday-Friday 8:00 am to 4:30 pm   [facility] Emergency Dept [phone]

## 2021-08-05 NOTE — PROGRESS NOTES
Arrived for RHC & biopsy procedure in CCL. AFVSS. Denies pain. Labs resulted. BG check with labs. Pt's wife, Cate, to drive him home post procedure #[phone].

## 2021-08-05 NOTE — LETTER
8/5/2021       RE: Jim Willingham  [address] [facility] [address]       Dear Colleague,    Thank you for the opportunity to participate in the care of your patient, Jim Willingham, at the [facility] [place] [facility]. Please see a copy of my visit note below.    ADULT HEART TRANSPLANT CLINIC    HPI:   Mr. Willingham is a 66 year old male who presents to clinic today for routine heart transplant follow-up. Patient has history of NICM s/p OHT (5/6/21) postoperative course was complicated by right pleural air leak with prolonged chest tube, paroxysmal atrial fibrillation, COPD, CKDIII, DMII, anemia. He was recently admitted on 6/28/21 for sepsis secondary to community-acquired RLL pneumonia. A diagnostic and therapeutic thoracentesis completed on 6/29 with 270ml removed. Pleural fluid was exudative and culture was negative. He was readmitted 7/8-7/19/21 for ongoing weakness and gout pain. Found to have recurrent R exudative pleural effusion and underwent another diagnostic thoracentesis c/w exudate. TG negative for chylothorax. Aerobic, fungal, anaerobic pleural cx from 7/10 and 7/11 negative. Acid Fast and Mycobacterial stains and culture from 7/11 negative. Repeat Chest CT 7/14 consistent with RLL cavitary lesion and KALI nodules x3. BD glucan indeterminate at 67. Aspergillus negative. Also found fewly identified pericardial effusion 7/12/21 1.4 cm in diameter per echo. Chest CT 7/13 consistent with anterior mediastinal fluid tracking. Plan is to monitor. Lastly, developed transaminitis with questionable choledocholithiasis. Abdominal US consistent with hepatomegaly with steatosis, CBD 2 mm, and patent vasculature. Hepatology consulted and plan is to monitor. His left MCP gout flare was treated with Anakinra.     Since most recent hospital discharge patient reports feeling well.  His gout pain has resolved.  He endorses occasional fleeting chest  pains when bending forward but lasting no more than a few seconds and this has been present for some time/unchanged.  Denies any exertional chest pain or shortness of breath.  He also has some shoulder pain which is positional as well.  He has been working on arm exercises given to him by rehab.  Endorses mild lower extreme edema but denies orthopnea, PND, significant shortness of breath.  He reports a good appetite.  Blood pressures are 110 over 70s 80s.  Patient denies recent fever, chills, cough, night sweats, oral lesions, rashes, nausea, vomiting, diarrhea.      PAST MEDICAL HISTORY:  Past Medical History:   Diagnosis Date     Bariatric surgery status 2003     Benign essential hypertension 05/11/2017     Bilateral carpal tunnel syndrome 11/02/2020     Cardiomyopathy, unspecified (H) 05/08/2017     CKD (chronic kidney disease) stage 3, GFR 30-59 ml/min 05/11/2017     COPD (chronic obstructive pulmonary disease) (H) 11/02/2020     Depression 05/11/2017     Diabetes mellitus (H) 1995     Gouty arthropathy, chronic, without tophi 11/02/2020     H/O gastric bypass 05/11/2017     ICD (implantable cardioverter-defibrillator), biventricular, in situ 05/11/2017     LVAD (left ventricular assist device) present (H)      Major depression, recurrent, chronic (H) 11/02/2020     NICM (nonischemic cardiomyopathy) (H)/ EF 20% 05/11/2017    ECHO: LVEDd. 7.66 cm, Restrictive pattern , Severe mitral valve regurgitation     CECILIA (obstructive sleep apnea) 05/11/2017     Paroxysmal atrial fibrillation (H) 05/11/2017     Paroxysmal VT (H) 05/11/2017     Rotator cuff tear arthropathy of both shoulders 11/02/2020     Uncomplicated asthma      Vitamin B12 deficiency (non anemic) 05/11/2017       FAMILY HISTORY:  Family History   Problem Relation Age of Onset     Cerebrovascular Disease Mother 64     Diabetes Mother      Hypertension Mother      Coronary Artery Disease Father      Diabetes Type 2  Father      Obesity Brother      Obesity  "Brother      Cerebrovascular Disease Daughter 40       SOCIAL HISTORY:  Socioeconomic History     Marital status:      Spouse name: None     Number of children: None     Years of education: None     Highest education level: None   Occupational History     None   Tobacco Use     Smoking status: Former Smoker     Quit date:      Years since quittin.6     Smokeless tobacco: Never Used   Substance and Sexual Activity     Alcohol use: No     Drug use: No     Sexual activity: Yes     Partners: Female   Other Topics Concern     Parent/sibling w/ CABG, MI or angioplasty before 65F 55M? No   Social History Narrative     None     Financial Resource Strain:      Difficulty of Paying Living Expenses:    Food Insecurity:      Worried About Running Out of Food in the Last Year:      Ran Out of Food in the Last Year:    Transportation Needs:      Lack of Transportation (Medical):      Lack of Transportation (Non-Medical):    Physical Activity:      Days of Exercise per Week:      Minutes of Exercise per Session:    Stress:      Feeling of Stress :    Social Connections:      Frequency of Communication with Friends and Family:      Frequency of Social Gatherings with Friends and Family:      Attends Jehov[employer]'s witness Services:      Active Member of Clubs or Organizations:      Attends Club or Organization Meetings:      Marital Status:    Intimate Partner Violence:      Fear of Current or Ex-Partner:      Emotionally Abused:      Physically Abused:      Sexually Abused:        CURRENT MEDICATIONS:  See below     ROS:  See HPI    EXAM:  /75 (BP Location: Right arm, Patient Position: Chair, Cuff Size: Adult Regular)   Pulse 118   Ht 1.748 m (5' 8.82\")   Wt 93.6 kg (206 lb 4.8 oz)   SpO2 99%   BMI 30.63 kg/m      GENERAL: Appears comfortable, in no acute distress.   HEENT: Eye symmetrical, no discharge or icterus bilaterally. Mucous membranes moist and without lesions. No thrush.   CV: Tachycardic and regular, " +S1S2, no murmur, rub, or gallop. JVP at clavicle.   RESPIRATORY: Respirations regular, even, and unlabored. Lungs CTA throughout.   GI: Soft, obese and non distended with normoactive bowel sounds present in all quadrants. No tenderness, rebound, guarding. No hepatomegaly.   EXTREMITIES: Trace BLE peripheral edema. 2+ bilateral pedal pulses.   NEUROLOGIC: Alert and oriented x 3. No focal deficits.   MUSCULOSKELETAL: No joint swelling or tenderness.   SKIN: No jaundice. No rashes or lesions.     Labs - reviewed with patient in clinic today:  CBC RESULTS:  Lab Results   Component Value Date    WBC 4.2 08/05/2021    WBC 7.8 07/11/2021    RBC 3.30 (L) 08/05/2021    RBC 3.06 (L) 07/11/2021    HGB 9.0 (L) 08/05/2021    HGB 9.2 (L) 08/05/2021    HGB 8.7 (L) 07/11/2021    HCT 31.4 (L) 08/05/2021    HCT 28.4 (L) 07/11/2021    MCV 95 08/05/2021    MCV 93 07/11/2021    MCH 27.9 08/05/2021    MCH 28.4 07/11/2021    MCHC 29.3 (L) 08/05/2021    MCHC 30.6 (L) 07/11/2021    RDW 16.7 (H) 08/05/2021    RDW 15.6 (H) 07/11/2021     08/05/2021     07/11/2021       CMP RESULTS:  Lab Results   Component Value Date     08/05/2021     (L) 07/11/2021    POTASSIUM 4.3 08/05/2021    POTASSIUM 5.1 07/11/2021    CHLORIDE 107 08/05/2021    CHLORIDE 104 07/11/2021    CO2 24 08/05/2021    CO2 23 07/11/2021    ANIONGAP 6 08/05/2021    ANIONGAP 6 07/11/2021    GLC 85 08/05/2021     (H) 07/11/2021    BUN 27 08/05/2021    BUN 49 (H) 07/11/2021    CR 1.45 (H) 08/05/2021    CR 2.75 (H) 07/11/2021    GFRESTIMATED 51 (L) 08/05/2021    GFRESTIMATED 23 (L) 07/11/2021    GFRESTBLACK 27 (L) 07/11/2021    QAMAR 8.2 (L) 08/05/2021    QAMAR 7.9 (L) 07/11/2021    BILITOTAL 0.5 08/05/2021    BILITOTAL 0.3 07/08/2021    ALBUMIN 2.9 (L) 08/05/2021    ALBUMIN 2.6 (L) 07/08/2021    ALKPHOS 281 (H) 08/05/2021    ALKPHOS 292 (H) 07/08/2021    ALT 36 08/05/2021    ALT 55 07/08/2021    AST 12 08/05/2021    AST Canceled, Test credited 07/08/2021         INR RESULTS:  Lab Results   Component Value Date    INR 1.16 (H) 07/19/2021    INR 1.06 07/10/2021       LIPID RESULTS:  Lab Results   Component Value Date    CHOL 209 (H) 11/05/2020    HDL 87 11/05/2020     (H) 11/05/2020    TRIG 91 11/05/2020       IMMUNOSUPPRESSANT LEVELS:  Lab Results   Component Value Date    TACROL 5.8 08/05/2021    TACROL 11.4 07/11/2021    DOSTAC  08/05/2021      Comment:      Last dose information not provided.    DOSTAC Not Provided 07/11/2021       No components found for: CK  Lab Results   Component Value Date    MAG 1.8 08/05/2021    MAG 1.8 07/11/2021     Lab Results   Component Value Date    A1C 5.1 05/04/2021     Lab Results   Component Value Date    PHOS 2.4 (L) 08/05/2021    PHOS 2.8 06/30/2021     Lab Results   Component Value Date    NTBNP 866 (H) 07/31/2019     Lab Results   Component Value Date    SAITESTMET [facility] 06/07/2021    SAICELL Class I 06/07/2021    NF2ENZBAW Cw:12 06/07/2021    SB9QFHMQTG None 06/07/2021    SAIREPCOM  06/07/2021      Test performed by modified procedure. Serum heat inactivated and tested   by a modified (Grant) protocol including fetal calf serum addition.   High-risk, mfi >3,000. Mod-risk, mfi 500-3,000.       Lab Results   Component Value Date    SAIITESTME [facility] 06/07/2021    SAIICELL Class II 06/07/2021    [id number] None 06/07/2021    [id number] None 06/07/2021    SAIIREPCOM  06/07/2021      Test performed by modified procedure. Serum heat inactivated and tested   by a modified (Grant) protocol including fetal calf serum addition.   High-risk, mfi >3,000. Mod-risk, mfi 500-3,000.       Lab Results   Component Value Date    CSPEC Plasma 06/29/2021       Diagnostic Studies:  RHC today  RA - --/--/10  RV - 34/10  PA - 35/15/25  PCWP - --/--/14  PA Sat - 64.4%  Hgb - 9.0  Kee CO/CI - 6/2.95  TD CO/CI - 6.27/3.08  PVR - 1.83 KWOK   SVR - 1240 dynes    TTE 7/23/21  Status post orthotopic heart transplant on 05/06/2021.  Global and regional  left ventricular function is hyperkinetic with an EF of  65-70%.  Global right ventricular function is normal. The right ventricle is normal  size.  No significant valvular abnormalities. There is only trace tricuspid  regurgitation.  A small, loculated pericardial effusion is still seen near the RV apex.  This study was compared with the study from 07/12/2021. The pericardial  effusion that was anterior to the RV is smaller in size, but still present.  The RA pressure is slightly higher. No change in the biventricular function.    Assessment/Plan:  Mr. Willingham is a 66 year old male who is s/p orthotopic heart transplant on 5/6/21 who presents to clinic for routine follow up and hospital follow-up . Patient has history of NICM, COPD, CKD, gout, DM2. Recent post transplant course c/b CAP and exudative left pleural effusion s/p thoracentesis x2 and recurrent gout flares.    # Status post OHT on 5/6/21, history of NICM  # Chronic immunosuppression  Complicated by right pleural air leak and prolonged CT, Afib, and CAP with cavitary lesion as above. RHC 7/16 with mRA-7, mPCW-11, MICHELLE CO-6.41, and MICHELLE CI-3.19. Echo 7/12 with EF 60-65%, normal RV function, and pericardial effusion without evidence of tamponade, and normal IVC. No history of rejection or DSAs.     Immunosuppression:  - Prednisone taper per protocol with negative bx  - Tacrolimus, trough level goal 8-10, has been low for awhile - change nystatin to clotrimazole as below and monitor closely.   - Cellcept 1500 mg bid    Prophylaxis:  - Thrush:change Nystatin to clotrimazole to help boost tacro levels  - PCP: due for pentamidine, may be able to use Bactrim next month if renal function stable  - PPI: pantoprazole   - CAV: ASA 81 mg and statin on hold w elevated LFTs, start when LFTs stable  - Osteoporosis: calcium and vitamin D  - CMV: Valcyte x 3 months - ok to stop per protocol    Serostatus:CMV: D+/R+, EBV: D+/R+, Toxo D-/R-    # Moderate right exudative  effusion. RLL Cavitary lesion. Diagnostic thoracentesis 7/10 consistent with exudate, TG negative for chylothorax. Aerobic, fungal, anaerobic pleural cx from 7/10 and 7/11 negative. Acid Fast and Mycobacterial stains and culture from 7/11 negative. Repeat Chest CT 7/14 consistent with RLL cavitary lesion and KALI nodules x3. BD glucan indeterminate at 67. Aspergillus negative. Transplant ID following.      # Mediastinal fluid collection. Pericardial effusion. Noted 7/12/21 1.4 cm in diameter. Chest CT 7/13 consistent with anterior mediastinal fluid tracking. Transplant ID and CVTS consulted last admission. Last RHC near normal filling pressures. Monitor with serial echo - last 7/23 showed smaller effusion.      # CKD. Cr improving, 1.4 today.      # MICHAEL. Iron studies low. Hgb ~9. Start ferrous sulfate.     # Severe protein calorie malnutrition. History of Sylvester En Y. Albumin 2.9 Referral to nutrition prn.      # Transaminitis, resolved, with questionable Choledocholithiasis. Abdominal US consistent with hepatomegaly with steatosis, CBD 2 mm, and patent vasculature. Trend per protocol.      # Left MCP Pseudogout, acute flare, resolved. Gout ongoing issues despite being on prednisone. Relieved with repeat courses of Anakinra. Will reach out to rheumatology to see if this is an option as an outpatient. Continue allopurinol for now.      # DM Type II, controlled. Follows with endocrine.  # Generalized weakness. Outpatient therapies.  # Depression. He is on wellbutrin 75 BID.  # COPD. He is on singulair, Trelegy Ellipta, Albuterol.            25 minutes spent face-to-face with patient, >50% in counseling and/or coordination of care as described above      Kiesha Wilder DNP, NP-C  8/5/2021          TOMMY SCHULER

## 2021-08-05 NOTE — Clinical Note
dry, intact, no bleeding and no hematoma. 7fr RIJ sheath removed, manual pressure applied , hemostasis achieved, bandage placed.

## 2021-08-06 ENCOUNTER — TELEPHONE (OUTPATIENT)
Dept: CARDIOLOGY | Facility: CLINIC | Age: 66
End: 2021-08-06

## 2021-08-06 ENCOUNTER — LAB REQUISITION (OUTPATIENT)
Dept: LAB | Facility: CLINIC | Age: 66
End: 2021-08-06
Payer: COMMERCIAL

## 2021-08-06 ENCOUNTER — TELEPHONE (OUTPATIENT)
Dept: RHEUMATOLOGY | Facility: CLINIC | Age: 66
End: 2021-08-06

## 2021-08-06 ENCOUNTER — TELEPHONE (OUTPATIENT)
Dept: TRANSPLANT | Facility: CLINIC | Age: 66
End: 2021-08-06

## 2021-08-06 DIAGNOSIS — Z94.1 S/P ORTHOTOPIC HEART TRANSPLANT (H): ICD-10-CM

## 2021-08-06 DIAGNOSIS — M1A.3790 CHRONIC GOUT DUE TO RENAL IMPAIRMENT INVOLVING FOOT WITHOUT TOPHUS, UNSPECIFIED LATERALITY: Primary | ICD-10-CM

## 2021-08-06 DIAGNOSIS — M10.369 ACUTE GOUT DUE TO RENAL IMPAIRMENT INVOLVING KNEE, UNSPECIFIED LATERALITY: ICD-10-CM

## 2021-08-06 DIAGNOSIS — D64.9 ANEMIA: Primary | ICD-10-CM

## 2021-08-06 LAB
EBV DNA # SPEC NAA+PROBE: NORMAL {COPIES}/ML
EBV DNA SPEC NAA+PROBE-LOG#: <2.7 {LOG_COPIES}/ML
HGB BLD-MCNC: 8.8 G/DL (ref 13.3–17.7)
OXYHGB MFR BLDV: 64 % (ref 92–100)
PATH REPORT.COMMENTS IMP SPEC: NORMAL
PATH REPORT.COMMENTS IMP SPEC: NORMAL
PATH REPORT.FINAL DX SPEC: NORMAL
PATH REPORT.GROSS SPEC: NORMAL
PATH REPORT.MICROSCOPIC SPEC OTHER STN: NORMAL
PATH REPORT.RELEVANT HX SPEC: NORMAL
PHOTO IMAGE: NORMAL

## 2021-08-06 RX ORDER — PREDNISONE 10 MG/1
5 TABLET ORAL EVERY MORNING
Qty: 90 TABLET | Refills: 0 | Status: ON HOLD | OUTPATIENT
Start: 2021-08-06 | End: 2021-09-02

## 2021-08-06 RX ORDER — ANAKINRA 100 MG/.67ML
100 INJECTION, SOLUTION SUBCUTANEOUS DAILY
Qty: 20.1 ML | Refills: 0 | Status: ON HOLD | OUTPATIENT
Start: 2021-08-06 | End: 2021-12-17

## 2021-08-06 RX ORDER — FERROUS SULFATE 325(65) MG
325 TABLET ORAL
Qty: 90 TABLET | Refills: 3 | Status: ON HOLD | OUTPATIENT
Start: 2021-08-06 | End: 2021-12-17

## 2021-08-06 NOTE — TELEPHONE ENCOUNTER
Please start iron supplement once a day for anemia.     Rheumatology is setting up an appointment with you for gout management.     Heart biopsy is negative, please decrease prednisone to 5 mg daily.

## 2021-08-06 NOTE — TELEPHONE ENCOUNTER
Health Call Center    Phone Message    May a detailed message be left on voicemail: yes     Reason for Call: Order(s): Other:   Reason for requested: PT  Date needed: 8/6/2021  Provider name: Delisa Montgomery MD from PT at [facility] called to request orders for PT treatment 2x per week for 4 weeks. He stated he only needs a verbal response at [phone]. You may leave a VM if he is unable to answer.       Action Taken: Message routed to:  [facility] ([facility] Cardiology    Travel Screening: Not Applicable

## 2021-08-07 LAB — STFR SERPL-MCNC: 7.3 MG/L

## 2021-08-09 ENCOUNTER — TELEPHONE (OUTPATIENT)
Dept: TRANSPLANT | Facility: CLINIC | Age: 66
End: 2021-08-09

## 2021-08-09 ENCOUNTER — MEDICAL CORRESPONDENCE (OUTPATIENT)
Dept: HEALTH INFORMATION MANAGEMENT | Facility: CLINIC | Age: 66
End: 2021-08-09

## 2021-08-09 ENCOUNTER — TELEPHONE (OUTPATIENT)
Dept: RHEUMATOLOGY | Facility: CLINIC | Age: 66
End: 2021-08-09

## 2021-08-09 ENCOUNTER — TELEPHONE (OUTPATIENT)
Dept: FAMILY MEDICINE | Facility: CLINIC | Age: 66
End: 2021-08-09

## 2021-08-09 ENCOUNTER — LAB REQUISITION (OUTPATIENT)
Dept: LAB | Facility: CLINIC | Age: 66
End: 2021-08-09
Payer: COMMERCIAL

## 2021-08-09 DIAGNOSIS — M1A.3790 CHRONIC GOUT DUE TO RENAL IMPAIRMENT INVOLVING FOOT WITHOUT TOPHUS, UNSPECIFIED LATERALITY: Primary | ICD-10-CM

## 2021-08-09 DIAGNOSIS — Z94.1 TRANSPLANTED HEART (H): Primary | ICD-10-CM

## 2021-08-09 DIAGNOSIS — Z51.81 ENCOUNTER FOR THERAPEUTIC DRUG LEVEL MONITORING: ICD-10-CM

## 2021-08-09 DIAGNOSIS — Z79.899 OTHER LONG TERM (CURRENT) DRUG THERAPY: ICD-10-CM

## 2021-08-09 DIAGNOSIS — Z48.21 ENCOUNTER FOR AFTERCARE FOLLOWING HEART TRANSPLANT (H): ICD-10-CM

## 2021-08-09 DIAGNOSIS — M10.369 ACUTE GOUT DUE TO RENAL IMPAIRMENT INVOLVING KNEE, UNSPECIFIED LATERALITY: Primary | ICD-10-CM

## 2021-08-09 LAB
ANION GAP SERPL CALCULATED.3IONS-SCNC: 6 MMOL/L (ref 3–14)
BACTERIA PLR CULT: NO GROWTH
BUN SERPL-MCNC: 23 MG/DL (ref 7–30)
CALCIUM SERPL-MCNC: 8.2 MG/DL (ref 8.5–10.1)
CHLORIDE BLD-SCNC: 108 MMOL/L (ref 94–109)
CO2 SERPL-SCNC: 25 MMOL/L (ref 20–32)
CREAT SERPL-MCNC: 1.44 MG/DL (ref 0.66–1.25)
ERYTHROCYTE [DISTWIDTH] IN BLOOD BY AUTOMATED COUNT: 16.5 % (ref 10–15)
GFR SERPL CREATININE-BSD FRML MDRD: 51 ML/MIN/1.73M2
GLUCOSE BLD-MCNC: 94 MG/DL (ref 70–99)
HCT VFR BLD AUTO: 29.1 % (ref 40–53)
HGB BLD-MCNC: 8.8 G/DL (ref 13.3–17.7)
IMMUKNOW IMMUNE CELL FUNCTION: 323 NG/ML
MCH RBC QN AUTO: 27.9 PG (ref 26.5–33)
MCHC RBC AUTO-ENTMCNC: 30.2 G/DL (ref 31.5–36.5)
MCV RBC AUTO: 92 FL (ref 78–100)
PLATELET # BLD AUTO: 284 10E3/UL (ref 150–450)
POTASSIUM BLD-SCNC: 3.7 MMOL/L (ref 3.4–5.3)
RBC # BLD AUTO: 3.15 10E6/UL (ref 4.4–5.9)
SODIUM SERPL-SCNC: 139 MMOL/L (ref 133–144)
WBC # BLD AUTO: 4.1 10E3/UL (ref 4–11)

## 2021-08-09 PROCEDURE — 80048 BASIC METABOLIC PNL TOTAL CA: CPT | Performed by: INTERNAL MEDICINE

## 2021-08-09 PROCEDURE — 85027 COMPLETE CBC AUTOMATED: CPT | Performed by: INTERNAL MEDICINE

## 2021-08-09 PROCEDURE — 36415 COLL VENOUS BLD VENIPUNCTURE: CPT | Performed by: INTERNAL MEDICINE

## 2021-08-09 PROCEDURE — 80197 ASSAY OF TACROLIMUS: CPT | Performed by: INTERNAL MEDICINE

## 2021-08-09 RX ORDER — ALBUTEROL SULFATE 0.83 MG/ML
2.5 SOLUTION RESPIRATORY (INHALATION) ONCE
Status: CANCELLED
Start: 2021-08-10 | End: 2021-08-10

## 2021-08-09 RX ORDER — PENTAMIDINE ISETHIONATE 300 MG/300MG
300 INHALANT RESPIRATORY (INHALATION) ONCE
Status: CANCELLED
Start: 2021-08-10 | End: 2021-08-10

## 2021-08-09 RX ORDER — ALBUTEROL SULFATE 90 UG/1
1-2 AEROSOL, METERED RESPIRATORY (INHALATION)
Status: CANCELLED
Start: 2021-08-10

## 2021-08-09 RX ORDER — PENTAMIDINE ISETHIONATE 300 MG/300MG
300 INHALANT RESPIRATORY (INHALATION) ONCE
Qty: 1 EACH | Refills: 0 | Status: SHIPPED | OUTPATIENT
Start: 2021-08-09 | End: 2021-08-09

## 2021-08-09 RX ORDER — PANTOPRAZOLE SODIUM 40 MG/1
40 TABLET, DELAYED RELEASE ORAL 2 TIMES DAILY
Qty: 60 TABLET | Refills: 1 | Status: SHIPPED | OUTPATIENT
Start: 2021-08-09 | End: 2021-10-11

## 2021-08-09 RX ORDER — ALBUTEROL SULFATE 0.83 MG/ML
2.5 SOLUTION RESPIRATORY (INHALATION) ONCE
Qty: 3 ML | Refills: 0 | Status: SHIPPED | OUTPATIENT
Start: 2021-08-09 | End: 2021-08-09

## 2021-08-09 RX ORDER — PREDNISONE 20 MG/1
40 TABLET ORAL DAILY
Qty: 10 TABLET | Refills: 1 | Status: SHIPPED | OUTPATIENT
Start: 2021-08-09 | End: 2021-08-14

## 2021-08-09 RX ORDER — DIPHENHYDRAMINE HYDROCHLORIDE 50 MG/ML
50 INJECTION INTRAMUSCULAR; INTRAVENOUS
Status: CANCELLED
Start: 2021-08-10

## 2021-08-09 RX ORDER — NALOXONE HYDROCHLORIDE 0.4 MG/ML
0.2 INJECTION, SOLUTION INTRAMUSCULAR; INTRAVENOUS; SUBCUTANEOUS
Status: CANCELLED | OUTPATIENT
Start: 2021-08-10

## 2021-08-09 RX ORDER — METHYLPREDNISOLONE SODIUM SUCCINATE 125 MG/2ML
125 INJECTION, POWDER, LYOPHILIZED, FOR SOLUTION INTRAMUSCULAR; INTRAVENOUS
Status: CANCELLED
Start: 2021-08-10

## 2021-08-09 RX ORDER — MEPERIDINE HYDROCHLORIDE 25 MG/ML
25 INJECTION INTRAMUSCULAR; INTRAVENOUS; SUBCUTANEOUS EVERY 30 MIN PRN
Status: CANCELLED | OUTPATIENT
Start: 2021-08-10

## 2021-08-09 RX ORDER — EPINEPHRINE 1 MG/ML
0.3 INJECTION, SOLUTION, CONCENTRATE INTRAVENOUS EVERY 5 MIN PRN
Status: CANCELLED | OUTPATIENT
Start: 2021-08-10

## 2021-08-09 RX ORDER — ALBUTEROL SULFATE 0.83 MG/ML
2.5 SOLUTION RESPIRATORY (INHALATION)
Status: CANCELLED | OUTPATIENT
Start: 2021-08-10

## 2021-08-09 NOTE — TELEPHONE ENCOUNTER
Medication Appeal Initiation    We have initiated an appeal for the requested medication:  Medication: Appeal for Kineret   Appeal Start Date:  8/9/2021  Insurance Company: [employer] - Phone [phone] Fax [phone]  Comments:   Letter in letters tab

## 2021-08-09 NOTE — TELEPHONE ENCOUNTER
As long as he is able to manage his fluid and serum glucose, we can treat with prednisone until we can get him his anakinra. I will place order for 40mg daily x5 days, no taper. He is very reliable but needs to keep close eye on these issues with his co-morbidities.    Thanks

## 2021-08-09 NOTE — TELEPHONE ENCOUNTER
Provider Call: H/C Ofelia # [phone]  Reason for Call: Gout and care plan   Callback needed? Yes    Return Call Needed  Same as documented in contacts section  When to return call?: Same day: Route High Priority

## 2021-08-09 NOTE — TELEPHONE ENCOUNTER
Called and spoke with a patient regarding gout flare, he is struggling with left hand pain. Started in the forefinger and now has spread throughout hand and has reached ring finger. Pt states he has not been able to tolerate anything touching that hand at all. Has tried ice and heat, does not get any relief. Pt would like something to help with the pain. We discussed pain medicine and flares, he would still like something to help with the pain.    He is currently taking Allopurinol 300 mg daily,  mg BID, Tacrolimus 6 mg BID and Prednisone 5 mg.  Pt had labs done on Thursday 8/5, have added on Uric Acid level.    Pt will see Dr. Pettit on 8/12 at 3 pm via virtual visit.    Sunshine Jc RN  Rheumatology Clinic

## 2021-08-09 NOTE — TELEPHONE ENCOUNTER
Called and updated pt, he will increase prednisone to 40 mg a day. He will watch for swelling and weight gain and will watch for increased blood sugars.  Updated him on plan for anakinra.    Sunshine Jc RN  Rheumatology Clinic

## 2021-08-09 NOTE — TELEPHONE ENCOUNTER
Ofelia with home health is with patient right now. Pt is c/o   Left hand gout 10/10 pain. Pt is taking tramadol for pain, but it isn't helping.   Also, alternating cold/warm packs. NO fevers. VSS. Tacro and labs drawn this am. Rheumatology is in the process of scheduling with patient for gout management. Prednisone decreased on Friday to 5 mg daily for negative heart biopsy. Message sent to providers for review.

## 2021-08-10 ENCOUNTER — VIRTUAL VISIT (OUTPATIENT)
Dept: PHARMACY | Facility: CLINIC | Age: 66
End: 2021-08-10
Payer: COMMERCIAL

## 2021-08-10 ENCOUNTER — TELEPHONE (OUTPATIENT)
Dept: TRANSPLANT | Facility: CLINIC | Age: 66
End: 2021-08-10

## 2021-08-10 DIAGNOSIS — Z79.4 TYPE 2 DIABETES MELLITUS WITH COMPLICATION, WITH LONG-TERM CURRENT USE OF INSULIN (H): Primary | ICD-10-CM

## 2021-08-10 DIAGNOSIS — M1A.00X0 GOUTY ARTHROPATHY, CHRONIC, WITHOUT TOPHI: ICD-10-CM

## 2021-08-10 DIAGNOSIS — Z94.1 S/P ORTHOTOPIC HEART TRANSPLANT (H): ICD-10-CM

## 2021-08-10 DIAGNOSIS — E11.8 TYPE 2 DIABETES MELLITUS WITH COMPLICATION, WITH LONG-TERM CURRENT USE OF INSULIN (H): Primary | ICD-10-CM

## 2021-08-10 LAB
ALLOMAP SCORE (EXTERNAL): 38 (ref 0–40)
NEGATIVE PREDICTIVE VALUE PERCENT (EXTERNAL): 97.9 %
POSITIVE PREDICTIVE VALUE PERCENT (EXTERNAL): NORMAL %
TACROLIMUS BLD-MCNC: 15.6 UG/L (ref 5–15)
TME LAST DOSE: ABNORMAL H
TME LAST DOSE: ABNORMAL H

## 2021-08-10 PROCEDURE — 99607 MTMS BY PHARM ADDL 15 MIN: CPT | Performed by: PHARMACIST

## 2021-08-10 PROCEDURE — 99606 MTMS BY PHARM EST 15 MIN: CPT | Performed by: PHARMACIST

## 2021-08-10 RX ORDER — TACROLIMUS 1 MG/1
CAPSULE ORAL
Qty: 900 CAPSULE | Refills: 11 | Status: SHIPPED | OUTPATIENT
Start: 2021-08-10 | End: 2021-08-18

## 2021-08-10 NOTE — TELEPHONE ENCOUNTER
Gout- minimal improvement. Only on day two of prednisone taper.     Pt didn't show for scheduled pentamidine Neb that was scheduled for today. Neb re-scheduled for 8/11 at 10 am. Time and instructions discussed with patient. Pt verbalized understanding.     Tacro result still pending.

## 2021-08-10 NOTE — PROGRESS NOTES
Medication Therapy Management (MTM) Encounter    ASSESSMENT:                            Medication Adherence/Access: No issues identified    Type 2 Diabetes: Spoke with coordinator, txp team would like MTM to make insulin adjustments while on 5 days burst. BG >200 currently when prior was well controlled. Increase Lantus to 26 units once daily. Give Humalog sliding scale before each meal TID.    Gout: See diabetes.    PLAN:                            1. While on prednisone increase Lantus to 26 units daily  2. Give Humalog sliding scale 3 times daily before meals, if you are consistently above 200 before meals, add 2 units to your sliding scale dose while on prednisone.  3. Once off prednisone go back to normal insulin.    Follow-up: 2 months      SUBJECTIVE/OBJECTIVE:                          Jim Willingham is a 66 year old male called for a follow-up visit. He was referred to me from txp team.  Today's visit is a follow-up MTM visit from      Reason for visit: diabetes follow up. Pt is on a steroid burst for a gout flare.    Allergies/ADRs: Reviewed in chart  Past Medical History: Reviewed in chart  Tobacco: He reports that he quit smoking about 26 years ago. He has never used smokeless tobacco.  Alcohol: not currently using    Medication Adherence/Access: no issues reported     Type 2 Diabetes:  Currently taking Lantus 18 units daily, Novolog sliding scale TID CM   Blood sugar monitorin time(s) daily. Ranges (patient reported): 107-141 before starting Prednisone yesterday.   This morning 269 (took 6 units of insulin)  Symptoms of low blood sugar? Stomach ache when he goes low, but no lows since being home  Symptoms of high blood sugar? None  Dr. Mims is his endocrinology, was on metformin, but stopped in  due to worsening kidney function.     Gout: Pt is taking Prednisone 40mg daily x 5 days. Started yesterday. Taking Allopurinol 300mg daily.     Today's Vitals: There were no vitals taken for this  visit.  ----------------    I spent 17 minutes with this patient today. All changes were made via approval by txp team. A copy of the visit note was provided to the patient's referring provider.    The patient was sent via iFollo a summary of these recommendations.     Akshat Parkinson PharmD  [facility] Pharmacist    Phone: [phone]     Telemedicine Visit Details  Type of service:  Telephone visit  Start Time: 2:38 PM  End Time: 2:55 PM  Originating Location (patient location): [place]  Distant Location (provider location):  [facility]     Medication Therapy Recommendations  Type 2 diabetes mellitus with complication, with long-term current use of insulin (H)    Current Medication: insulin glargine (LANTUS PEN) 100 UNIT/ML pen   Rationale: Dose too low - Dosage too low - Effectiveness   Recommendation: Increase Dose   Status: Accepted per Provider

## 2021-08-10 NOTE — RESULT ENCOUNTER NOTE
Tac level 15.6. goal 8-10. Confirmed 12 hour trough. Pt started on troches last week for sub therapeutic tac levels. Current dose 6 mg twice a day. Instructions given to decrease to 5/5 and repeat in 1 week. Pt called with the above. No answer and unable to leave a VM.

## 2021-08-11 ENCOUNTER — TELEPHONE (OUTPATIENT)
Dept: TRANSPLANT | Facility: CLINIC | Age: 66
End: 2021-08-11

## 2021-08-11 DIAGNOSIS — Z94.1 HEART REPLACED BY TRANSPLANT (H): Primary | ICD-10-CM

## 2021-08-11 LAB
DONOR IDENTIFICATION: NORMAL
DSA COMMENTS: NORMAL
DSA PRESENT: NO
DSA TEST METHOD: NORMAL
ORGAN: NORMAL
SA 1 CELL: NORMAL
SA 1 TEST METHOD: NORMAL
SA 2 CELL: NORMAL
SA 2 TEST METHOD: NORMAL
SA1 HI RISK ABY: NORMAL
SA1 MOD RISK ABY: NORMAL
SA2 HI RISK ABY: NORMAL
SA2 MOD RISK ABY: NORMAL
UNACCEPTABLE ANTIGENS: NORMAL
UNOS CPRA: 0
ZZZSA 1  COMMENTS: NORMAL
ZZZSA 2 COMMENTS: NORMAL

## 2021-08-11 PROCEDURE — 94640 AIRWAY INHALATION TREATMENT: CPT | Performed by: INTERNAL MEDICINE

## 2021-08-11 PROCEDURE — 94642 AEROSOL INHALATION TREATMENT: CPT | Performed by: INTERNAL MEDICINE

## 2021-08-11 RX ORDER — ALBUTEROL SULFATE 0.83 MG/ML
2.5 SOLUTION RESPIRATORY (INHALATION) ONCE
Status: CANCELLED
Start: 2021-09-08 | End: 2021-09-08

## 2021-08-11 RX ORDER — PENTAMIDINE ISETHIONATE 300 MG/300MG
300 INHALANT RESPIRATORY (INHALATION) ONCE
Status: COMPLETED | OUTPATIENT
Start: 2021-08-11 | End: 2021-08-11

## 2021-08-11 RX ORDER — EPINEPHRINE 1 MG/ML
0.3 INJECTION, SOLUTION, CONCENTRATE INTRAVENOUS EVERY 5 MIN PRN
Status: CANCELLED | OUTPATIENT
Start: 2021-09-08

## 2021-08-11 RX ORDER — PENTAMIDINE ISETHIONATE 300 MG/300MG
300 INHALANT RESPIRATORY (INHALATION) ONCE
Status: CANCELLED
Start: 2021-09-08 | End: 2021-09-08

## 2021-08-11 RX ORDER — MEPERIDINE HYDROCHLORIDE 25 MG/ML
25 INJECTION INTRAMUSCULAR; INTRAVENOUS; SUBCUTANEOUS EVERY 30 MIN PRN
Status: CANCELLED | OUTPATIENT
Start: 2021-09-08

## 2021-08-11 RX ORDER — ALBUTEROL SULFATE 90 UG/1
1-2 AEROSOL, METERED RESPIRATORY (INHALATION)
Status: CANCELLED
Start: 2021-09-08

## 2021-08-11 RX ORDER — ALBUTEROL SULFATE 0.83 MG/ML
2.5 SOLUTION RESPIRATORY (INHALATION) ONCE
Status: COMPLETED | OUTPATIENT
Start: 2021-08-11 | End: 2021-08-11

## 2021-08-11 RX ORDER — ALBUTEROL SULFATE 0.83 MG/ML
2.5 SOLUTION RESPIRATORY (INHALATION)
Status: CANCELLED | OUTPATIENT
Start: 2021-09-08

## 2021-08-11 RX ORDER — NALOXONE HYDROCHLORIDE 0.4 MG/ML
0.2 INJECTION, SOLUTION INTRAMUSCULAR; INTRAVENOUS; SUBCUTANEOUS
Status: CANCELLED | OUTPATIENT
Start: 2021-09-08

## 2021-08-11 RX ORDER — DIPHENHYDRAMINE HYDROCHLORIDE 50 MG/ML
50 INJECTION INTRAMUSCULAR; INTRAVENOUS
Status: CANCELLED
Start: 2021-09-08

## 2021-08-11 RX ORDER — METHYLPREDNISOLONE SODIUM SUCCINATE 125 MG/2ML
125 INJECTION, POWDER, LYOPHILIZED, FOR SOLUTION INTRAMUSCULAR; INTRAVENOUS
Status: CANCELLED
Start: 2021-09-08

## 2021-08-11 RX ADMIN — ALBUTEROL SULFATE 2.5 MG: 0.83 SOLUTION RESPIRATORY (INHALATION) at 10:20

## 2021-08-11 RX ADMIN — PENTAMIDINE ISETHIONATE 300 MG: 300 INHALANT RESPIRATORY (INHALATION) at 10:22

## 2021-08-11 NOTE — TELEPHONE ENCOUNTER
Tac level 15.6. goal 8-10. Confirmed 12 hour trough. Pt started on troches last week for sub therapeutic tac levels. Current dose 6 mg twice a day. Instructions given to decrease to 5/5 and repeat on Monday with home health RN>     Pentamidine appt for today at 10 confirmed.

## 2021-08-11 NOTE — PROGRESS NOTES
Patient was seen today for a Pentamidine nebulizer tx ordered by Dr. Delisa Montgomery.    Patient was first given 2.5 mg Albuterol  nebulizer, after which Pentamidine 300 mg (Lot # [id number]) mixed with 6cc Sterile H2O was administered through a filtered nebulizer.    Pre-treatment: SpO2 = 99%     HR = 111     BBS = clear  Post-treatment: SpO2 = 99%     HR =125     BBS = reduced, but clear    Patient mentioned adverse reaction to last treatment (done without pre-albuterol tx), and breath sounds reduced with patient feeling tight after Pentamidine.  Patient given another 2.5 mg Albuterol neb and feeling better and released.    Procedure was completed today by Jessica Smith.

## 2021-08-12 ENCOUNTER — VIRTUAL VISIT (OUTPATIENT)
Dept: RHEUMATOLOGY | Facility: CLINIC | Age: 66
End: 2021-08-12
Attending: INTERNAL MEDICINE
Payer: COMMERCIAL

## 2021-08-12 ENCOUNTER — MYC REFILL (OUTPATIENT)
Dept: FAMILY MEDICINE | Facility: CLINIC | Age: 66
End: 2021-08-12

## 2021-08-12 DIAGNOSIS — Z79.899 HIGH RISK MEDICATION USE: ICD-10-CM

## 2021-08-12 DIAGNOSIS — Z79.52 LONG TERM (CURRENT) USE OF SYSTEMIC STEROIDS: ICD-10-CM

## 2021-08-12 DIAGNOSIS — M10.342 ACUTE GOUT DUE TO RENAL IMPAIRMENT INVOLVING LEFT HAND: Primary | ICD-10-CM

## 2021-08-12 PROCEDURE — 99214 OFFICE O/P EST MOD 30 MIN: CPT | Mod: GT | Performed by: INTERNAL MEDICINE

## 2021-08-12 RX ORDER — PREDNISONE 10 MG/1
TABLET ORAL
Qty: 24 TABLET | Refills: 2 | Status: ON HOLD | OUTPATIENT
Start: 2021-08-12 | End: 2021-09-02

## 2021-08-12 RX ORDER — VALGANCICLOVIR 450 MG/1
900 TABLET, FILM COATED ORAL DAILY
Status: CANCELLED | OUTPATIENT
Start: 2021-08-12

## 2021-08-12 NOTE — PROGRESS NOTES
Jim is a 66 year old who is being evaluated via a billable video visit.      How would you like to obtain your AVS? MyChart    Will anyone else be joining your video visit? No      Video-Visit Details    Type of service:  Video Visit    Start: 08/12/2021 03:02 pm   Stop: 08/12/2021 03:23 pm  Originating Location (pt. Location): Home    Distant Location (provider location):  [facility] [place]     Platform used for Video Visit:Giacomo Pettit MD  Rheumatology

## 2021-08-12 NOTE — PROGRESS NOTES
Outpatient Rheumatology Video Follow-up  This visit was conducted via synchronous VIDEO visit due to the current COVID-19 crisis to reduce patient risk.  Verbal consent was obtained and is documented below.    Name: Jim Willingham    MRN [id number]   Today's date: 8/12/21         Reason for visit: Gout follow-up        Assessment & Plan:   66 year old male with a history of gout, CTS,  NICM, (EF 20%) s/p LVAD placement (6/19/17), afib, RV failure, CKD3, DM2 c/b neuropathy, CECILIA, HTN, asthma who underwent OHT on 5/6/2021 who I follow for management of gout. When first evaluated by me in 9/2020 he had been on 20mg prednisone and 100mg allopurinol for 3 years without titration. Uric acid was 9 on 2/21/2020. His allopurinol was slowly up titrated to 300mg and he remains of prednisone 5mg daily after his OHT which we are also using for paradoxical flare prophylaxis.     Acute on chronic gout: Active.  Uric acid 4.8 on 5/28. At goal. Will continue on allopurinol 300mg daily. His current acute inflammatory arthritis location/quality of symptoms is consistent with prior flares and will treat with prednisone taper 30mg daily x4 days, then 20mg daily x4 days, then 10mg daily x4 days then back to baseline of 5mg daily. We are working to obtain anakinra approval to avoid the prednisone effects on this heart transplant patient with added infection risk/volume effects.    High risk medication: allopurinol  -No side effects  -Continuing to monitor tox with labs    Long term systemic steroid use: Current taper, and baseline of 5mg s/p OHT    PLAN:  1) Continue on allopurinol 300mg daily  2) Prednisone taper 30mg daily x4 days, then 20mg daily x4 days, then 10mg daily x4 days then back to baseline of 5mg daily.   3) Anakinra approval pending  4) Follow-up in 6 weeks with labs the week prior    Elbert Pettit MD  Rheumatology     I spent a total of 30 minutes on the date of service on chart review, patient video encounter,  documentation.     Subjective:   Left index finger is main issue, also in 3rd digit on the left hand. Functionally some improvement in ability to flex index finger since increasing steroids. Overall not getting worse, thinks it is stable. Reports the pain is some improved. Now able to open fridge without pain. Though still very tender. Still some warmth, this is some improved. Was previously hot. No longer. No break in skin. This is a site that he previously had gout. Previously when in the hospital, had greater MTP, knee, and all MCPs.  This current flare has been going on for 7 days. This includes the 4 days that he has been on 40mg of prednisone for per my instructions prior to this visit. Still on 300mg of allopurinol. No side effects ont his. Baseline dose of 5mg of prednisone. Does report some tremors of his hands, though this was present prior to higher dose of prednisone but worse on prednisone compared to not. Otherwise no new fever, chills. No weight changes. No chest pain/SOB. Cardiac rehab going well. ROS otherwise negative.     Past Medical History  Past Medical History:   Diagnosis Date     Bariatric surgery status 2003     Benign essential hypertension 05/11/2017     Bilateral carpal tunnel syndrome 11/02/2020     Cardiomyopathy, unspecified (H) 05/08/2017     CKD (chronic kidney disease) stage 3, GFR 30-59 ml/min 05/11/2017     COPD (chronic obstructive pulmonary disease) (H) 11/02/2020     Depression 05/11/2017     Diabetes mellitus (H) 1995     Gouty arthropathy, chronic, without tophi 11/02/2020     H/O gastric bypass 05/11/2017     ICD (implantable cardioverter-defibrillator), biventricular, in situ 05/11/2017     LVAD (left ventricular assist device) present (H)      Major depression, recurrent, chronic (H) 11/02/2020     NICM (nonischemic cardiomyopathy) (H)/ EF 20% 05/11/2017    ECHO: LVEDd. 7.66 cm, Restrictive pattern , Severe mitral valve regurgitation     CECILIA (obstructive sleep apnea)  05/11/2017     Paroxysmal atrial fibrillation (H) 05/11/2017     Paroxysmal VT (H) 05/11/2017     Rotator cuff tear arthropathy of both shoulders 11/02/2020     Uncomplicated asthma      Vitamin B12 deficiency (non anemic) 05/11/2017     Past Surgical History  Past Surgical History:   Procedure Laterality Date     ANESTHESIA CARDIOVERSION N/A 05/11/2020    Procedure: ANESTHESIA, FOR CARDIOVERSION @1100;  Surgeon: GENERIC ANESTHESIA PROVIDER;  Location: [facility] OR     CV HEART BIOPSY N/A 5/13/2021    Procedure: Heart Biopsy;  Surgeon: Scout Robins MD;  Location: [facility]     CV HEART BIOPSY N/A 5/20/2021    Procedure: Heart Biopsy;  Surgeon: Jeffrey Gibson MD;  Location: [facility]     CV HEART BIOPSY N/A 5/27/2021    Procedure: Heart Biopsy;  Surgeon: Jac Dover MD;  Location: [facility]     CV HEART BIOPSY N/A 6/7/2021    Procedure: CV HEART BIOPSY;  Surgeon: Jeffrey Gibson MD;  Location: [facility]     CV HEART BIOPSY N/A 6/21/2021    Procedure: CV HEART BIOPSY;  Surgeon: Scout Robins MD;  [facility]     CV HEART BIOPSY N/A 7/5/2021    Procedure: CV HEART BIOPSY;  Surgeon: Jeffrey Gibson MD;  [facility]     CV HEART BIOPSY N/A 7/16/2021    Procedure: Heart Biopsy;  Surgeon: Amadeo Art MD;  [facility]: [facility]     CV HEART BIOPSY N/A 8/5/2021    Procedure: Heart Biopsy;  Surgeon: Jeffrey Gibson MD;  [facility]     CV RIGHT HEART CATH MEASUREMENTS RECORDED N/A 07/24/2019    Procedure: CV RIGHT HEART CATH;  Surgeon: Renu Sears MD;  Location:  HE[facility] CARDIAC CATH LAB     CV RIGHT HEART CATH MEASUREMENTS RECORDED N/A 08/05/2020    Procedure: CV RIGHT HEART CATH;  Surgeon: Nicola Seth MD;  [facility]     CV RIGHT HEART CATH MEASUREMENTS  RECORDED N/A 01/07/2021    Procedure: CV RIGHT HEART CATH;  Surgeon: Jac Dover MD;  Location:  [facility] CARDI[facility] CATH LAB     CV RIGHT HEART CATH MEASUREMENTS RECORDED N/A 02/23/2021    Procedure: Heart Cath Right Heart Cath;  Surgeon: Jeffrey Gibson MD;  Location:  HE[facility] CARDIAC CATH LAB     CV RIGHT HEART CATH MEASUREMENTS RECORDED N/A 03/23/2021    Procedure: Heart Cath Right Heart Cath. request for 3/23;  Surgeon: Jeffrey Gibson MD;  Location:  [facility]ARD[facility] CATH [facility]     CV RIGHT HEART CATH MEASUREMENTS RECORDED N/A 5/13/2021    Procedure: Right Heart Cath;  Surgeon: Scout Robins MD;  Location:  HE[facility] CARDIAC CATH LAB     CV RIGHT HEART CATH MEASUREMENTS RECORDED N/A 5/20/2021    Procedure: Right Heart Cath;  Surgeon: Jeffrey Gibson MD;  Location:  HEART CARDIAC CATH LAB     CV RIGHT HEART CATH MEASUREMENTS RECORDED N/A 5/27/2021    Procedure: Right Heart Cath;  Surgeon: Jac Dover MD;  Location:  HE[facility] CARDIAC CATH LAB     CV RIGHT HEART CATH MEASUREMENTS RECORDED N/A 6/7/2021    Procedure: CV RIGHT HEART CATH;  Surgeon: Jeffrey Gibson MD;  Location:  HE[facility] CARDI[facility] CATH LAB     CV RIGHT HEART CATH MEASUREMENTS RECORDED N/A 6/21/2021    Procedure: CV RIGHT HEART CATH;  Surgeon: Scout Robins MD;  Location:  HE[facility] CARDIAC CATH LAB     CV RIGHT HEART CATH MEASUREMENTS RECORDED N/A 7/5/2021    Procedure: CV RIGHT HEART CATH;  Surgeon: Jeffrey Gibson MD;  Location:  HE[facility] CARDI[facility] CATH LAB     CV RIGHT HEART CATH MEASUREMENTS RECORDED N/A 7/16/2021    Procedure: Right Heart Cath;  Surgeon: Amadeo Art MD;  Location:  HEART CARDIAC CATH LAB     CV RIGHT HEART CATH MEASUREMENTS RECORDED N/A 8/5/2021    Procedure: CV RIGHT HEART CATH;  Surgeon: Jeffrey Gibson MD;  Location:  HEART CARDIAC CATH LAB     GI SURGERY  2003    [facility]     INSERT VENTRICULAR ASSIST  DEVICE LEFT (HEARTMATE II) N/A 06/19/2017    Procedure: INSERT VENTRICULAR ASSIST DEVICE LEFT (HEARTMATE II);  Median Sternotomy Heartmate II Left Ventricular Assist Device Insertion on Pump Oxygenator;  Surgeon: Ronnie Quigley MD;  Location: [facility] OR     IR CHEST TUBE PLACEMENT NON-TUNNELED RIGHT  7/11/2021     ORTHOPEDIC SURGERY  1994    right knee wired     PICC DOUBLE LUMEN PLACEMENT Right 09/23/2020    5FR PICC DL. Length-43cm (1cm out).     PICC INSERTION - DOUBLE LUMEN Right 05/09/2021    IK/BRACH     RELEASE CARPAL TUNNEL BILATERAL Bilateral 02/18/2021    Procedure: Bilateral carpal tunnel release;  Surgeon: Jermaine Brand MD;  Location: [facility] OR     TRANSPLANT HEART RECIPIENT N/A 05/05/2021    Procedure: Redo median sternotomy, lysis of adhesions, heart transplant recipient, on cardiopulmonary bypass, intraoperative transesophageal echocardiogram per anesthesia, Implantable Cardioverter Defibrillator (ICD) removal;  Surgeon: Ronnie Quigley MD;  Location: [facility] OR       Medications  Current Outpatient Medications   Medication     allopurinol (ZYLOPRIM) 300 MG tablet     amitriptyline (ELAVIL) 25 MG tablet     anakinra (KINERET) 100 MG/0.67ML SOSY injection     aspirin (ASA) 81 MG chewable tablet     blood glucose (ONE TOUCH DELICA) lancing device     blood glucose monitoring (ONE TOUCH ULTRA 2) meter device kit     blood glucose VI test strip     buPROPion (WELLBUTRIN) 75 MG tablet     calcium citrate-vitamin D (CITRACAL) 315-250 MG-UNIT TABS per tablet     clotrimazole (MYCELEX) 10 MG lozenge     ferrous sulfate (FEROSUL) 325 (65 Fe) MG tablet     Fluticasone-Umeclidin-Vilanterol (TRELEGY ELLIPTA) 100-62.5-25 MCG/INH oral inhaler     gabapentin (NEURONTIN) 300 MG capsule     HUMALOG KWIKPEN 100 UNIT/ML soln     insulin glargine (LANTUS PEN) 100 UNIT/ML pen     insulin pen needle (32G X 4 MM) 32G X 4 MM miscellaneous     Melatonin 10 MG CAPS     montelukast (SINGULAIR) 10 MG tablet     mycophenolate (GENERIC  EQUIVALENT) 250 MG capsule     pantoprazole (PROTONIX) 40 MG EC tablet     predniSONE (DELTASONE) 10 MG tablet     predniSONE (DELTASONE) 20 MG tablet     tacrolimus (GENERIC EQUIVALENT) 1 MG capsule     traMADol (ULTRAM) 50 MG tablet     valGANciclovir (VALCYTE) 450 MG tablet     ACE/ARB/ARNI NOT PRESCRIBED (INTENTIONAL)     acetaminophen (TYLENOL) 325 MG tablet     artificial tears OINT ophthalmic ointment     Continuous Blood Gluc Transmit (DEXCOM G6 TRANSMITTER) MISC     gabapentin (NEURONTIN) 300 MG capsule     polyethylene glycol (MIRALAX) 17 GM/Dose powder     senna-docusate (SENOKOT-S/PERICOLACE) 8.6-50 MG tablet     No current facility-administered medications for this visit.     Allergies  Allergies   Allergen Reactions     Grass Shortness Of Breath     Ace Inhibitors Cough     Dust Mites Other (See Comments)     Asthma     Mold Other (See Comments)     Asthma     Penicillins Other (See Comments)     Unknown - childhood exposure    Tolerated Zosyn -2020     Sulfa Drugs Other (See Comments) and Unknown     Unknown childhood reaction     Family History  Family History   Problem Relation Age of Onset     Cerebrovascular Disease Mother 64     Diabetes Mother      Hypertension Mother      Coronary Artery Disease Father      Diabetes Type 2  Father      Obesity Brother      Obesity Brother      Cerebrovascular Disease Daughter 40   No other family history of autoimmune disease    Social History  Social History     Tobacco Use     Smoking status: Former Smoker     Quit date:      Years since quittin.6     Smokeless tobacco: Never Used   Substance Use Topics     Alcohol use: No     Drug use: No        Objective:   No vitals for video visit  GEN: Sitting up at table, NAD, pleasant as usual  HEENT: no facial rash, sclera clear, no inflammatory nasal bridge or external ear changes  CV: No upper extremity edema  Pulm: speaking in full sentences, no cough, no audible wheeze  Abdomen: not  distended  Skin: no acute cutaneous lesions appreciated  MSK: left dorsum hand with obvious edema of the 2nd-4th MCPs with extension towards but not involving the wrist. Has tenderness to self palpation at this site. Cannot make a full fist due to swelling. Right hand unaffected.     Labs:  8/9/21  WBC 4.1  HGB 8.8    Cr 1.44    Cr 1.94  sUA 4.9  Cholesterol 209  Trig 91  HDL 87    A1C 5.7  WBC 4.8  HGB 10      10/9/20  WBC 6.2  HGB 10.6    Cr 1.34  GFR 65    Jan 2020 serum uric acid 9

## 2021-08-12 NOTE — LETTER
8/12/2021       RE: Jim Willingham  [address] [facility] [address]     Dear Colleague,    Thank you for referring your patient, Jim Willingham, to the [facility] RHEUMATOLOGY CLINIC [place] at [facility]. Please see a copy of my visit note below.    Jim is a 66 year old who is being evaluated via a billable video visit.      How would you like to obtain your AVS? MyChart    Will anyone else be joining your video visit? No      Video-Visit Details    Type of service:  Video Visit    Start: 08/12/2021 03:02 pm   Stop: 08/12/2021 03:23 pm  Originating Location (pt. Location): Home    Distant Location (provider location):  [facility] RHEUMATOLOGY [facility]     Platform used for Video Visit:Giacomo Pettit MD  Rheumatology          Outpatient Rheumatology Video Follow-up  This visit was conducted via synchronous VIDEO visit due to the current COVID-19 crisis to reduce patient risk.  Verbal consent was obtained and is documented below.    Name: Jim Willingham    MRN [id number]   Today's date: 8/12/21         Reason for visit: Gout follow-up        Assessment & Plan:   66 year old male with a history of gout, CTS,  NICM, (EF 20%) s/p LVAD placement (6/19/17), afib, RV failure, CKD3, DM2 c/b neuropathy, CECILIA, HTN, asthma who underwent OHT on 5/6/2021 who I follow for management of gout. When first evaluated by me in 9/2020 he had been on 20mg prednisone and 100mg allopurinol for 3 years without titration. Uric acid was 9 on 2/21/2020. His allopurinol was slowly up titrated to 300mg and he remains of prednisone 5mg daily after his OHT which we are also using for paradoxical flare prophylaxis.     Acute on chronic gout: Active.  Uric acid 4.8 on 5/28. At goal. Will continue on allopurinol 300mg daily. His current acute inflammatory arthritis location/quality of symptoms is consistent with prior flares and will treat with prednisone  taper 30mg daily x4 days, then 20mg daily x4 days, then 10mg daily x4 days then back to baseline of 5mg daily. We are working to obtain anakinra approval to avoid the prednisone effects on this heart transplant patient with added infection risk/volume effects.    High risk medication: allopurinol  -No side effects  -Continuing to monitor tox with labs    Long term systemic steroid use: Current taper, and baseline of 5mg s/p OHT    PLAN:  1) Continue on allopurinol 300mg daily  2) Prednisone taper 30mg daily x4 days, then 20mg daily x4 days, then 10mg daily x4 days then back to baseline of 5mg daily.   3) Anakinra approval pending  4) Follow-up in 6 weeks with labs the week prior    Elbert Pettit MD  Rheumatology     I spent a total of 30 minutes on the date of service on chart review, patient video encounter, documentation.     Subjective:   Left index finger is main issue, also in 3rd digit on the left hand. Functionally some improvement in ability to flex index finger since increasing steroids. Overall not getting worse, thinks it is stable. Reports the pain is some improved. Now able to open fridge without pain. Though still very tender. Still some warmth, this is some improved. Was previously hot. No longer. No break in skin. This is a site that he previously had gout. Previously when in the hospital, had greater MTP, knee, and all MCPs.  This current flare has been going on for 7 days. This includes the 4 days that he has been on 40mg of prednisone for per my instructions prior to this visit. Still on 300mg of allopurinol. No side effects ont his. Baseline dose of 5mg of prednisone. Does report some tremors of his hands, though this was present prior to higher dose of prednisone but worse on prednisone compared to not. Otherwise no new fever, chills. No weight changes. No chest pain/SOB. Cardiac rehab going well. ROS otherwise negative.     Past Medical History  Past Medical History:   Diagnosis Date      Bariatric surgery status 2003     Benign essential hypertension 05/11/2017     Bilateral carpal tunnel syndrome 11/02/2020     Cardiomyopathy, unspecified (H) 05/08/2017     CKD (chronic kidney disease) stage 3, GFR 30-59 ml/min 05/11/2017     COPD (chronic obstructive pulmonary disease) (H) 11/02/2020     Depression 05/11/2017     Diabetes mellitus (H) 1995     Gouty arthropathy, chronic, without tophi 11/02/2020     H/O gastric bypass 05/11/2017     ICD (implantable cardioverter-defibrillator), biventricular, in situ 05/11/2017     LVAD (left ventricular assist device) present (H)      Major depression, recurrent, chronic (H) 11/02/2020     NICM (nonischemic cardiomyopathy) (H)/ EF 20% 05/11/2017    ECHO: LVEDd. 7.66 cm, Restrictive pattern , Severe mitral valve regurgitation     CECILIA (obstructive sleep apnea) 05/11/2017     Paroxysmal atrial fibrillation (H) 05/11/2017     Paroxysmal VT (H) 05/11/2017     Rotator cuff tear arthropathy of both shoulders 11/02/2020     Uncomplicated asthma      Vitamin B12 deficiency (non anemic) 05/11/2017     Past Surgical History  Past Surgical History:   Procedure Laterality Date     ANESTHESIA CARDIOVERSION N/A 05/11/2020    Procedure: ANESTHESIA, FOR CARDIOVERSION @1100;  Surgeon: GENERIC ANESTHESIA PROVIDER;  Location: [facility] OR     CV HEART BIOPSY N/A 5/13/2021    Procedure: Heart Biopsy;  Surgeon: Scout Robins MD;  Location: [facility] HEART CARDIAC CATH LAB     CV HEART BIOPSY N/A 5/20/2021    Procedure: Heart Biopsy;  Surgeon: Jeffrey Gibson MD;  Location: [facility] HEART CARDIAC CATH LAB     CV HEART BIOPSY N/A 5/27/2021    Procedure: Heart Biopsy;  Surgeon: Jac Dover MD;  Location: [facility] HEART CARDIAC CATH LAB     CV HEART BIOPSY N/A 6/7/2021    Procedure: CV HEART BIOPSY;  Surgeon: Jeffrey Gibson MD;  Location: [facility] HEART CARDIAC CATH LAB     CV HEART BIOPSY N/A 6/21/2021    Procedure: CV HEART BIOPSY;  Surgeon: Scout Robins MD;   Location:  HEART CARDIAC CATH LAB     CV HEART BIOPSY N/A 7/5/2021    Procedure: CV HEART BIOPSY;  Surgeon: Jeffrey Gibson MD;  Location:  HEART CARDIAC CATH LAB     CV HEART BIOPSY N/A 7/16/2021    Procedure: Heart Biopsy;  Surgeon: Amadeo Art MD;  Location:  HEART CARDIAC CATH LAB     CV HEART BIOPSY N/A 8/5/2021    Procedure: Heart Biopsy;  Surgeon: Jeffrey Gibson MD;  Location:  HEART CARDIAC CATH LAB     CV RIGHT HEART CATH MEASUREMENTS RECORDED N/A 07/24/2019    Procedure: CV RIGHT HEART CATH;  Surgeon: Renu Sears MD;  Location:  HEART CARDIAC CATH LAB     CV RIGHT HEART CATH MEASUREMENTS RECORDED N/A 08/05/2020    Procedure: CV RIGHT HEART CATH;  Surgeon: Nicola Seth MD;  Location:  HEART CARDIAC CATH LAB     CV RIGHT HEART CATH MEASUREMENTS RECORDED N/A 01/07/2021    Procedure: CV RIGHT HEART CATH;  Surgeon: Jac Dover MD;  Location:  HEART CARDIAC CATH LAB     CV RIGHT HEART CATH MEASUREMENTS RECORDED N/A 02/23/2021    Procedure: Heart Cath Right Heart Cath;  Surgeon: Jeffrey Gibson MD;  Location:  HEART CARDIAC CATH LAB     CV RIGHT HEART CATH MEASUREMENTS RECORDED N/A 03/23/2021    Procedure: Heart Cath Right Heart Cath. request for 3/23;  Surgeon: Jeffrey Gibson MD;  Location:  HEART CARDIAC CATH LAB     CV RIGHT HEART CATH MEASUREMENTS RECORDED N/A 5/13/2021    Procedure: Right Heart Cath;  Surgeon: Scout Robins MD;  Location:  HEART CARDIAC CATH LAB     CV RIGHT HEART CATH MEASUREMENTS RECORDED N/A 5/20/2021    Procedure: Right Heart Cath;  Surgeon: Jeffrey Gibson MD;  Location:  HEART CARDIAC CATH LAB     CV RIGHT HEART CATH MEASUREMENTS RECORDED N/A 5/27/2021    Procedure: Right Heart Cath;  Surgeon: Jac Dover MD;  Location:  HEART CARDIAC CATH LAB     CV RIGHT HEART CATH MEASUREMENTS RECORDED N/A 6/7/2021    Procedure: CV RIGHT HEART CATH;   Surgeon: Jeffrey Gibson MD;  Location:  HEART CARDIAC CATH LAB     CV RIGHT HEART CATH MEASUREMENTS RECORDED N/A 6/21/2021    Procedure: CV RIGHT HEART CATH;  Surgeon: Scout Robins MD;  Location:  HEART CARDIAC CATH LAB     CV RIGHT HEART CATH MEASUREMENTS RECORDED N/A 7/5/2021    Procedure: CV RIGHT HEART CATH;  Surgeon: Jeffrey Gibson MD;  Location:  HEART CARDIAC CATH LAB     CV RIGHT HEART CATH MEASUREMENTS RECORDED N/A 7/16/2021    Procedure: Right Heart Cath;  Surgeon: Amadeo Art MD;  Location:  HEART CARDIAC CATH LAB     CV RIGHT HEART CATH MEASUREMENTS RECORDED N/A 8/5/2021    Procedure: CV RIGHT HEART CATH;  Surgeon: Jeffrey Gibson MD;  Location:  HEART CARDIAC CATH LAB     GI SURGERY  2003    [facility]     INSERT VENTRICULAR ASSIST DEVICE LEFT (HEARTMATE II) N/A 06/19/2017    Procedure: INSERT VENTRICULAR ASSIST DEVICE LEFT (HEARTMATE II);  Median Sternotomy Heartmate II Left Ventricular Assist Device Insertion on Pump Oxygenator;  Surgeon: Ronnie Quigley MD;  Location:  OR     IR CHEST TUBE PLACEMENT NON-TUNNELED RIGHT  7/11/2021     ORTHOPEDIC SURGERY  1994    right knee wired     PICC DOUBLE LUMEN PLACEMENT Right 09/23/2020    5FR PICC DL. Length-43cm (1cm out).     PICC INSERTION - DOUBLE LUMEN Right 05/09/2021    IK/BRACH     RELEASE CARPAL TUNNEL BILATERAL Bilateral 02/18/2021    Procedure: Bilateral carpal tunnel release;  Surgeon: Jermaine Brand MD;  Location:  OR     TRANSPLANT HEART RECIPIENT N/A 05/05/2021    Procedure: Redo median sternotomy, lysis of adhesions, heart transplant recipient, on cardiopulmonary bypass, intraoperative transesophageal echocardiogram per anesthesia, Implantable Cardioverter Defibrillator (ICD) removal;  Surgeon: Ronnie Quigley MD;  Location:  OR       Medications  Current Outpatient Medications   Medication     allopurinol (ZYLOPRIM) 300 MG tablet     amitriptyline (ELAVIL) 25 MG tablet      anakinra (KINERET) 100 MG/0.67ML SOSY injection     aspirin (ASA) 81 MG chewable tablet     blood glucose (ONE TOUCH DELICA) lancing device     blood glucose monitoring (ONE TOUCH ULTRA 2) meter device kit     blood glucose VI test strip     buPROPion (WELLBUTRIN) 75 MG tablet     calcium citrate-vitamin D (CITRACAL) 315-250 MG-UNIT TABS per tablet     clotrimazole (MYCELEX) 10 MG lozenge     ferrous sulfate (FEROSUL) 325 (65 Fe) MG tablet     Fluticasone-Umeclidin-Vilanterol (TRELEGY ELLIPTA) 100-62.5-25 MCG/INH oral inhaler     gabapentin (NEURONTIN) 300 MG capsule     HUMALOG KWIKPEN 100 UNIT/ML soln     insulin glargine (LANTUS PEN) 100 UNIT/ML pen     insulin pen needle (32G X 4 MM) 32G X 4 MM miscellaneous     Melatonin 10 MG CAPS     montelukast (SINGULAIR) 10 MG tablet     mycophenolate (GENERIC EQUIVALENT) 250 MG capsule     pantoprazole (PROTONIX) 40 MG EC tablet     predniSONE (DELTASONE) 10 MG tablet     predniSONE (DELTASONE) 20 MG tablet     tacrolimus (GENERIC EQUIVALENT) 1 MG capsule     traMADol (ULTRAM) 50 MG tablet     valGANciclovir (VALCYTE) 450 MG tablet     ACE/ARB/ARNI NOT PRESCRIBED (INTENTIONAL)     acetaminophen (TYLENOL) 325 MG tablet     artificial tears OINT ophthalmic ointment     Continuous Blood Gluc Transmit (DEXCOM G6 TRANSMITTER) MISC     gabapentin (NEURONTIN) 300 MG capsule     polyethylene glycol (MIRALAX) 17 GM/Dose powder     senna-docusate (SENOKOT-S/PERICOLACE) 8.6-50 MG tablet     No current facility-administered medications for this visit.     Allergies  Allergies   Allergen Reactions     Grass Shortness Of Breath     Ace Inhibitors Cough     Dust Mites Other (See Comments)     Asthma     Mold Other (See Comments)     Asthma     Penicillins Other (See Comments)     Unknown - childhood exposure    Tolerated Zosyn 9/18-9/20/2020     Sulfa Drugs Other (See Comments) and Unknown     Unknown childhood reaction     Family History  Family History   Problem Relation Age of  Onset     Cerebrovascular Disease Mother 64     Diabetes Mother      Hypertension Mother      Coronary Artery Disease Father      Diabetes Type 2  Father      Obesity Brother      Obesity Brother      Cerebrovascular Disease Daughter 40   No other family history of autoimmune disease    Social History  Social History     Tobacco Use     Smoking status: Former Smoker     Quit date:      Years since quittin.6     Smokeless tobacco: Never Used   Substance Use Topics     Alcohol use: No     Drug use: No        Objective:   No vitals for video visit  GEN: Sitting up at table, NAD, pleasant as usual  HEENT: no facial rash, sclera clear, no inflammatory nasal bridge or external ear changes  CV: No upper extremity edema  Pulm: speaking in full sentences, no cough, no audible wheeze  Abdomen: not distended  Skin: no acute cutaneous lesions appreciated  MSK: left dorsum hand with obvious edema of the 2nd-4th MCPs with extension towards but not involving the wrist. Has tenderness to self palpation at this site. Cannot make a full fist due to swelling. Right hand unaffected.     Labs:  21  WBC 4.1  HGB 8.8    Cr 1.44    Cr 1.94  sUA 4.9  Cholesterol 209  Trig 91  HDL 87    A1C 5.7  WBC 4.8  HGB 10      10/9/20  WBC 6.2  HGB 10.6    Cr 1.34  GFR 65    2020 serum uric acid 9

## 2021-08-13 ENCOUNTER — TELEPHONE (OUTPATIENT)
Dept: PHARMACY | Facility: CLINIC | Age: 66
End: 2021-08-13

## 2021-08-13 NOTE — TELEPHONE ENCOUNTER
Duplicate refill request for Valganciclovir 450 mg.  See other refill request dated 8/12/21.     Ronit Ray RN  [facility]

## 2021-08-13 NOTE — TELEPHONE ENCOUNTER
Patient has started a taper of prednisone. Last dose of 40mg today, then 2 days of 30mg, then 2 days of 20mg, then 2 days of 10mg, 2 days of 5mg.     FBG are about 170s.   Rest of the day are 200s.     Dr. Pettit requests I help patient with his insulin during his prednisone taper. See below for changes:    1. Add 2 units of Humalog to the sliding scale.     2. prednisone 30mg daily x 4 days: Give 26 units of Lantus.   Prednisone 20mg daily x 4 days, give 24 units of Lantus  10mg daily x 4 days, give 22 units of Lantus  5mg daily x 4 days, give 20 units of Lantus  Then when of prednisone, give 18 units of Lantus daily.     Akshat Parkinson, PharmD  [facility] Pharmacist    Phone: [phone]

## 2021-08-16 ENCOUNTER — TELEPHONE (OUTPATIENT)
Dept: TRANSPLANT | Facility: CLINIC | Age: 66
End: 2021-08-16

## 2021-08-16 DIAGNOSIS — E11.40 TYPE 2 DIABETES MELLITUS WITH DIABETIC NEUROPATHY, WITHOUT LONG-TERM CURRENT USE OF INSULIN (H): ICD-10-CM

## 2021-08-16 NOTE — TELEPHONE ENCOUNTER
[facility] did not have staffing to send RN to [facility] this am. Pt due to have a recheck of tac level after last week starting troches and having a level of 15. We decreased tac dosing to 5 mg bid. [facility] to go to home on 8/17 and check a level.

## 2021-08-16 NOTE — TELEPHONE ENCOUNTER
Form faxed to [phone] and placed in scan bin to be scanned into chart.      Akilah BOYCE)(ROBIN)

## 2021-08-17 ENCOUNTER — MEDICAL CORRESPONDENCE (OUTPATIENT)
Dept: HEALTH INFORMATION MANAGEMENT | Facility: CLINIC | Age: 66
End: 2021-08-17

## 2021-08-17 ENCOUNTER — TELEPHONE (OUTPATIENT)
Dept: FAMILY MEDICINE | Facility: CLINIC | Age: 66
End: 2021-08-17

## 2021-08-17 ENCOUNTER — LAB REQUISITION (OUTPATIENT)
Dept: LAB | Facility: CLINIC | Age: 66
End: 2021-08-17
Payer: COMMERCIAL

## 2021-08-17 DIAGNOSIS — Z48.21 ENCOUNTER FOR AFTERCARE FOLLOWING HEART TRANSPLANT (H): ICD-10-CM

## 2021-08-17 LAB
ANION GAP SERPL CALCULATED.3IONS-SCNC: 5 MMOL/L (ref 3–14)
BUN SERPL-MCNC: 51 MG/DL (ref 7–30)
CALCIUM SERPL-MCNC: 8.1 MG/DL (ref 8.5–10.1)
CHLORIDE BLD-SCNC: 104 MMOL/L (ref 94–109)
CO2 SERPL-SCNC: 25 MMOL/L (ref 20–32)
CREAT SERPL-MCNC: 2.28 MG/DL (ref 0.66–1.25)
ERYTHROCYTE [DISTWIDTH] IN BLOOD BY AUTOMATED COUNT: 16.1 % (ref 10–15)
GFR SERPL CREATININE-BSD FRML MDRD: 29 ML/MIN/1.73M2
GLUCOSE BLD-MCNC: 149 MG/DL (ref 70–99)
HCT VFR BLD AUTO: 29.5 % (ref 40–53)
HGB BLD-MCNC: 9 G/DL (ref 13.3–17.7)
MCH RBC QN AUTO: 27.2 PG (ref 26.5–33)
MCHC RBC AUTO-ENTMCNC: 30.5 G/DL (ref 31.5–36.5)
MCV RBC AUTO: 89 FL (ref 78–100)
PLATELET # BLD AUTO: 342 10E3/UL (ref 150–450)
POTASSIUM BLD-SCNC: 4.5 MMOL/L (ref 3.4–5.3)
RBC # BLD AUTO: 3.31 10E6/UL (ref 4.4–5.9)
SODIUM SERPL-SCNC: 134 MMOL/L (ref 133–144)
TACROLIMUS BLD-MCNC: 13.8 UG/L (ref 5–15)
TME LAST DOSE: NORMAL H
TME LAST DOSE: NORMAL H
WBC # BLD AUTO: 6.1 10E3/UL (ref 4–11)

## 2021-08-17 PROCEDURE — 80048 BASIC METABOLIC PNL TOTAL CA: CPT | Mod: ORL | Performed by: INTERNAL MEDICINE

## 2021-08-17 PROCEDURE — 85027 COMPLETE CBC AUTOMATED: CPT | Mod: ORL | Performed by: INTERNAL MEDICINE

## 2021-08-17 PROCEDURE — 80197 ASSAY OF TACROLIMUS: CPT | Mod: ORL | Performed by: INTERNAL MEDICINE

## 2021-08-17 NOTE — TELEPHONE ENCOUNTER
Called to check in on [place] and perform appropriate triage for report of fall below, left message to call back and ask to speak to RN     Nohemi Schaefer RN, BSN, CMSRN  [facility]

## 2021-08-17 NOTE — TELEPHONE ENCOUNTER
Gulshan with [facility] called to make Dr. Gómez aware that patient reported fall this morning, he was getting out of bed and tripped over a toy laying on floor, did bump shoulder and head on dresser, no injuries noted, does have a little bit of headache, patient was advised if pain worsens worsens or gets nauseated, go to ED.  Gulshan can be reached at  [phone]

## 2021-08-18 ENCOUNTER — TELEPHONE (OUTPATIENT)
Dept: TRANSPLANT | Facility: CLINIC | Age: 66
End: 2021-08-18

## 2021-08-18 DIAGNOSIS — Z94.1 S/P ORTHOTOPIC HEART TRANSPLANT (H): ICD-10-CM

## 2021-08-18 RX ORDER — TACROLIMUS 1 MG/1
CAPSULE ORAL
Qty: 900 CAPSULE | Refills: 11 | Status: ON HOLD | OUTPATIENT
Start: 2021-08-18 | End: 2021-09-02

## 2021-08-18 NOTE — TELEPHONE ENCOUNTER
Called patient.  He stated that he has chronic left shoulder pain from rotator cuff tear.  No worsening in shoulder pain today. No bruises noted.  Had a slight headache and felt unsteady yesterday following the fall but denies any symptoms today.  He stated that he is now back to baseline but will continue to monitor for any symptoms       Cheri Collazo RN  [facility]

## 2021-08-18 NOTE — TELEPHONE ENCOUNTER
Recent tac level 13.8 goal 8-10. Decreased dose to 4 mg bid. Cr bumped from 1.4 to 2.2. Pt weight is 195 (baseline 191-103). Pt will continue to monitor. If weight continues to rise, pt will notify coordinator.  BMP and tac recheck on Monday 8/23.

## 2021-08-19 NOTE — TELEPHONE ENCOUNTER
Called HTP appeals again, LVM and asked for an update - if not call back by EOD, will call main line

## 2021-08-20 ENCOUNTER — TELEPHONE (OUTPATIENT)
Dept: RHEUMATOLOGY | Facility: CLINIC | Age: 66
End: 2021-08-20

## 2021-08-20 DIAGNOSIS — M10.9 ACUTE GOUT OF RIGHT HAND, UNSPECIFIED CAUSE: Primary | ICD-10-CM

## 2021-08-20 RX ORDER — PREDNISONE 10 MG/1
20 TABLET ORAL DAILY
Qty: 28 TABLET | Refills: 0 | Status: ON HOLD | OUTPATIENT
Start: 2021-08-20 | End: 2021-09-02

## 2021-08-20 NOTE — TELEPHONE ENCOUNTER
[facility] Call Center    Phone Message    May a detailed message be left on voicemail: yes     Reason for Call: Symptoms or Concerns     If patient has red-flag symptoms, warm transfer to triage line    Current symptom or concern: gout    Symptoms have been present for:  2 week(s)    Has patient previously been seen for this? Yes    By Dr. Pettit      Are there any new or worsening symptoms? Yes: moving down his arm      Action Taken: Other: Rheum    Travel Screening: Not Applicable

## 2021-08-20 NOTE — TELEPHONE ENCOUNTER
SUBJECTIVE/OBJECTIVE                                                    Jim Willingham is a 66 year old male who calls with pain in pointer finger on right hand that has moved up to wrist and shoots up to elbow.    Patient has diagnosis of: Gout  Rheumatology Provider: Dr. Elbert Pettit      Duration: Started 2 weeks ago in pointer finger on right hand, the wrist pain started 3 days ago and patient states it is getting worse.    Onset: gradual    Description: Sharp, Shooting, Stabbing and Throbbing    Location: pointer finger on right hand, right wrist    Intensity:  7/10 unless he bumps it and then he states it is 11/10    Pain Quality: aching, stabbing, sharp, throbbing, shooting. Patient stated that it comes in bursts, but also a constant ache    Accompanying signs and symptoms: none    Aggravating factors include:  Any time it is bumped or has to use his had to  something    Functionality: Pain keeps me from doing most of what I need to do    Relieving factors include: Heat, Tylenol and Other medications: patient states he is currently on a prednisone taper: today he takes his last dose of 20mg, then he takes 10mg a day for 3 days and then down to 5mg daily      History  Previous similar problem: YES  Is this a usual flare?: Yes       Current Therapies:  Prednisone? Yes    Dose: today he takes his last dose of 20mg, then he takes 10mg a day for 3 days and then down to 5mg daily  Infusion Therapy? No    Drug Name: N/A   Next Infusion: N/A  Current Rheumatology meds:   Home Therapies:  heat and OTC pain medications (tylenol)      Problems taking medications regularly: No    Medication side effects: No significant side effects    Difficulty with ADLs: sleeping and other: using right hand is painful    Allergies:   Allergies   Allergen Reactions     Grass Shortness Of Breath     Ace Inhibitors Cough     Dust Mites Other (See Comments)     Asthma     Mold Other (See Comments)     Asthma     Penicillins  Other (See Comments)     Unknown - childhood exposure    Tolerated Zosyn 9/18-9/20/2020     Sulfa Drugs Other (See Comments) and Unknown     Unknown childhood reaction     Additional history: as documented  Preferred Pharmacy:    [facility] - [place], [place] - [address] [id number] [address] [place] AT [address] [facility]  [address]   Call back phone number:  [phone] (home) ,   Telephone Information:   Mobile [phone]       Routing to RN for review.    leg length discrepancy=k

## 2021-08-23 ENCOUNTER — TELEPHONE (OUTPATIENT)
Dept: TRANSPLANT | Facility: CLINIC | Age: 66
End: 2021-08-23

## 2021-08-23 ENCOUNTER — MEDICAL CORRESPONDENCE (OUTPATIENT)
Dept: HEALTH INFORMATION MANAGEMENT | Facility: CLINIC | Age: 66
End: 2021-08-23

## 2021-08-23 ENCOUNTER — LAB REQUISITION (OUTPATIENT)
Dept: LAB | Facility: CLINIC | Age: 66
End: 2021-08-23
Payer: COMMERCIAL

## 2021-08-23 DIAGNOSIS — Z94.1 TRANSPLANTED HEART (H): Primary | ICD-10-CM

## 2021-08-23 DIAGNOSIS — Z48.21 ENCOUNTER FOR AFTERCARE FOLLOWING HEART TRANSPLANT (H): ICD-10-CM

## 2021-08-23 LAB
ANION GAP SERPL CALCULATED.3IONS-SCNC: 5 MMOL/L (ref 3–14)
BUN SERPL-MCNC: 34 MG/DL (ref 7–30)
CALCIUM SERPL-MCNC: 8.3 MG/DL (ref 8.5–10.1)
CHLORIDE BLD-SCNC: 103 MMOL/L (ref 94–109)
CO2 SERPL-SCNC: 27 MMOL/L (ref 20–32)
CREAT SERPL-MCNC: 1.99 MG/DL (ref 0.66–1.25)
ERYTHROCYTE [DISTWIDTH] IN BLOOD BY AUTOMATED COUNT: 16.6 % (ref 10–15)
GFR SERPL CREATININE-BSD FRML MDRD: 34 ML/MIN/1.73M2
GLUCOSE BLD-MCNC: 172 MG/DL (ref 70–99)
HCT VFR BLD AUTO: 29.7 % (ref 40–53)
HGB BLD-MCNC: 8.9 G/DL (ref 13.3–17.7)
MCH RBC QN AUTO: 26.9 PG (ref 26.5–33)
MCHC RBC AUTO-ENTMCNC: 30 G/DL (ref 31.5–36.5)
MCV RBC AUTO: 90 FL (ref 78–100)
PLATELET # BLD AUTO: 328 10E3/UL (ref 150–450)
POTASSIUM BLD-SCNC: 4 MMOL/L (ref 3.4–5.3)
RBC # BLD AUTO: 3.31 10E6/UL (ref 4.4–5.9)
SODIUM SERPL-SCNC: 135 MMOL/L (ref 133–144)
TACROLIMUS BLD-MCNC: 6 UG/L (ref 5–15)
TME LAST DOSE: NORMAL H
TME LAST DOSE: NORMAL H
WBC # BLD AUTO: 5.1 10E3/UL (ref 4–11)

## 2021-08-23 PROCEDURE — 80197 ASSAY OF TACROLIMUS: CPT | Mod: ORL | Performed by: INTERNAL MEDICINE

## 2021-08-23 PROCEDURE — 80048 BASIC METABOLIC PNL TOTAL CA: CPT | Mod: ORL | Performed by: INTERNAL MEDICINE

## 2021-08-23 PROCEDURE — 85027 COMPLETE CBC AUTOMATED: CPT | Mod: ORL | Performed by: INTERNAL MEDICINE

## 2021-08-23 NOTE — TELEPHONE ENCOUNTER
Patient called stating he feels like he is getting a cold has a slight fever of 99.3 with some shortness of breath.

## 2021-08-23 NOTE — TELEPHONE ENCOUNTER
Home health RN called stating the patient c/o the following:     Chills  Temp 98.7  Baseline weight  191, today's 187.9    Gout still painful. Insurance denied anakinara. 5/10 pain. Last day of prednisone 20 mg then will decrease to 10 mg per taper.     .   /67    CBC with differential added to today's lab.     Message sent to rheumatology. Dr.Luke Pettit suggested patient increase prednisone to 40 mg daily x 5 days for recurrent gout. Message sent to Dr. Montgomery for approval. Above orders communicated to patient. Pt verbalized understanding of plan.

## 2021-08-24 ENCOUNTER — HOSPITAL ENCOUNTER (INPATIENT)
Facility: CLINIC | Age: 66
LOS: 9 days | Discharge: HOME HEALTH | DRG: 177 | End: 2021-09-02
Attending: EMERGENCY MEDICINE | Admitting: INTERNAL MEDICINE
Payer: COMMERCIAL

## 2021-08-24 ENCOUNTER — APPOINTMENT (OUTPATIENT)
Dept: GENERAL RADIOLOGY | Facility: CLINIC | Age: 66
DRG: 177 | End: 2021-08-24
Attending: EMERGENCY MEDICINE
Payer: COMMERCIAL

## 2021-08-24 ENCOUNTER — APPOINTMENT (OUTPATIENT)
Dept: CT IMAGING | Facility: CLINIC | Age: 66
DRG: 177 | End: 2021-08-24
Attending: NURSE PRACTITIONER
Payer: COMMERCIAL

## 2021-08-24 DIAGNOSIS — R00.0 SINUS TACHYCARDIA: ICD-10-CM

## 2021-08-24 DIAGNOSIS — Z94.1 S/P ORTHOTOPIC HEART TRANSPLANT (H): Primary | ICD-10-CM

## 2021-08-24 DIAGNOSIS — U07.1 LAB TEST POSITIVE FOR DETECTION OF COVID-19 VIRUS: ICD-10-CM

## 2021-08-24 DIAGNOSIS — M1A.00X0 GOUTY ARTHROPATHY, CHRONIC, WITHOUT TOPHI: ICD-10-CM

## 2021-08-24 DIAGNOSIS — R50.9 FEVER IN ADULT: ICD-10-CM

## 2021-08-24 DIAGNOSIS — Z94.1 STATUS POST HEART TRANSPLANTATION (H): ICD-10-CM

## 2021-08-24 DIAGNOSIS — R06.02 SHORTNESS OF BREATH: ICD-10-CM

## 2021-08-24 DIAGNOSIS — Z94.1 TRANSPLANTED HEART (H): ICD-10-CM

## 2021-08-24 DIAGNOSIS — J98.4 CAVITARY LESION OF LUNG: ICD-10-CM

## 2021-08-24 LAB
ALBUMIN SERPL-MCNC: 2.6 G/DL (ref 3.4–5)
ALBUMIN UR-MCNC: 50 MG/DL
ALP SERPL-CCNC: 122 U/L (ref 40–150)
ALT SERPL W P-5'-P-CCNC: 17 U/L (ref 0–70)
ANION GAP SERPL CALCULATED.3IONS-SCNC: 4 MMOL/L (ref 3–14)
APPEARANCE UR: CLEAR
AST SERPL W P-5'-P-CCNC: 10 U/L (ref 0–45)
ATRIAL RATE - MUSE: 132 BPM
BASE EXCESS BLDV CALC-SCNC: 0.4 MMOL/L (ref -7.7–1.9)
BASOPHILS # BLD MANUAL: 0.1 10E3/UL (ref 0–0.2)
BASOPHILS NFR BLD MANUAL: 2 %
BILIRUB SERPL-MCNC: 0.6 MG/DL (ref 0.2–1.3)
BILIRUB UR QL STRIP: NEGATIVE
BUN SERPL-MCNC: 32 MG/DL (ref 7–30)
CALCIUM SERPL-MCNC: 8 MG/DL (ref 8.5–10.1)
CHLORIDE BLD-SCNC: 106 MMOL/L (ref 94–109)
CMV DNA SPEC NAA+PROBE-ACNC: NOT DETECTED IU/ML
CO2 SERPL-SCNC: 26 MMOL/L (ref 20–32)
COLOR UR AUTO: ABNORMAL
CREAT SERPL-MCNC: 1.91 MG/DL (ref 0.66–1.25)
D DIMER PPP FEU-MCNC: 3.27 UG/ML FEU (ref 0–0.5)
DIASTOLIC BLOOD PRESSURE - MUSE: NORMAL MMHG
ELLIPTOCYTES BLD QL SMEAR: SLIGHT
EOSINOPHIL # BLD MANUAL: 0 10E3/UL (ref 0–0.7)
EOSINOPHIL NFR BLD MANUAL: 0 %
ERYTHROCYTE [DISTWIDTH] IN BLOOD BY AUTOMATED COUNT: 16.6 % (ref 10–15)
GFR SERPL CREATININE-BSD FRML MDRD: 36 ML/MIN/1.73M2
GLUCOSE BLD-MCNC: 192 MG/DL (ref 70–99)
GLUCOSE BLDC GLUCOMTR-MCNC: 392 MG/DL (ref 70–99)
GLUCOSE BLDC GLUCOMTR-MCNC: 403 MG/DL (ref 70–99)
GLUCOSE UR STRIP-MCNC: NEGATIVE MG/DL
HCO3 BLDV-SCNC: 25 MMOL/L (ref 21–28)
HCO3 BLDV-SCNC: 25 MMOL/L (ref 21–28)
HCT VFR BLD AUTO: 28.9 % (ref 40–53)
HGB BLD-MCNC: 8.8 G/DL (ref 13.3–17.7)
HGB UR QL STRIP: NEGATIVE
HOLD SPECIMEN: NORMAL
INTERPRETATION ECG - MUSE: NORMAL
KETONES UR STRIP-MCNC: NEGATIVE MG/DL
LACTATE BLD-SCNC: 0.8 MMOL/L
LEUKOCYTE ESTERASE UR QL STRIP: NEGATIVE
LYMPHOCYTES # BLD MANUAL: 0.4 10E3/UL (ref 0.8–5.3)
LYMPHOCYTES NFR BLD MANUAL: 8 %
MCH RBC QN AUTO: 27.4 PG (ref 26.5–33)
MCHC RBC AUTO-ENTMCNC: 30.4 G/DL (ref 31.5–36.5)
MCV RBC AUTO: 90 FL (ref 78–100)
MONOCYTES # BLD MANUAL: 0.6 10E3/UL (ref 0–1.3)
MONOCYTES NFR BLD MANUAL: 11 %
MUCOUS THREADS #/AREA URNS LPF: PRESENT /LPF
NEUTROPHILS # BLD MANUAL: 4 10E3/UL (ref 1.6–8.3)
NEUTROPHILS NFR BLD MANUAL: 79 %
NITRATE UR QL: NEGATIVE
NT-PROBNP SERPL-MCNC: 3277 PG/ML (ref 0–900)
O2/TOTAL GAS SETTING VFR VENT: 99 %
OXYHGB MFR BLDV: 60 % (ref 70–75)
P AXIS - MUSE: 71 DEGREES
PCO2 BLDV: 40 MM HG (ref 40–50)
PCO2 BLDV: 40 MM HG (ref 40–50)
PH BLDV: 7.4 [PH] (ref 7.32–7.43)
PH BLDV: 7.41 [PH] (ref 7.32–7.43)
PH UR STRIP: 7 [PH] (ref 5–7)
PLAT MORPH BLD: ABNORMAL
PLATELET # BLD AUTO: 295 10E3/UL (ref 150–450)
PO2 BLDV: 31 MM HG (ref 25–47)
PO2 BLDV: 32 MM HG (ref 25–47)
POTASSIUM BLD-SCNC: 4.1 MMOL/L (ref 3.4–5.3)
PR INTERVAL - MUSE: 142 MS
PROCALCITONIN SERPL-MCNC: 0.16 NG/ML
PROT SERPL-MCNC: 6.2 G/DL (ref 6.8–8.8)
QRS DURATION - MUSE: 84 MS
QT - MUSE: 284 MS
QTC - MUSE: 420 MS
R AXIS - MUSE: 64 DEGREES
RBC # BLD AUTO: 3.21 10E6/UL (ref 4.4–5.9)
RBC MORPH BLD: ABNORMAL
RBC URINE: 1 /HPF
SAO2 % BLDV: 60 % (ref 94–100)
SARS-COV-2 RNA RESP QL NAA+PROBE: POSITIVE
SODIUM SERPL-SCNC: 136 MMOL/L (ref 133–144)
SP GR UR STRIP: 1.02 (ref 1–1.03)
SQUAMOUS EPITHELIAL: <1 /HPF
SYSTOLIC BLOOD PRESSURE - MUSE: NORMAL MMHG
T AXIS - MUSE: 50 DEGREES
TROPONIN I SERPL-MCNC: <0.015 UG/L (ref 0–0.04)
URATE SERPL-MCNC: 5.3 MG/DL (ref 3.5–7.2)
UROBILINOGEN UR STRIP-MCNC: NORMAL MG/DL
VENTRICULAR RATE- MUSE: 132 BPM
WBC # BLD AUTO: 5.1 10E3/UL (ref 4–11)
WBC URINE: 2 /HPF

## 2021-08-24 PROCEDURE — 96375 TX/PRO/DX INJ NEW DRUG ADDON: CPT

## 2021-08-24 PROCEDURE — 99291 CRITICAL CARE FIRST HOUR: CPT | Mod: 25 | Performed by: EMERGENCY MEDICINE

## 2021-08-24 PROCEDURE — 85027 COMPLETE CBC AUTOMATED: CPT | Performed by: EMERGENCY MEDICINE

## 2021-08-24 PROCEDURE — U0005 INFEC AGEN DETEC AMPLI PROBE: HCPCS | Performed by: EMERGENCY MEDICINE

## 2021-08-24 PROCEDURE — 81001 URINALYSIS AUTO W/SCOPE: CPT | Performed by: EMERGENCY MEDICINE

## 2021-08-24 PROCEDURE — 36415 COLL VENOUS BLD VENIPUNCTURE: CPT | Performed by: EMERGENCY MEDICINE

## 2021-08-24 PROCEDURE — 93010 ELECTROCARDIOGRAM REPORT: CPT | Performed by: EMERGENCY MEDICINE

## 2021-08-24 PROCEDURE — 96365 THER/PROPH/DIAG IV INF INIT: CPT | Mod: 59

## 2021-08-24 PROCEDURE — 71045 X-RAY EXAM CHEST 1 VIEW: CPT

## 2021-08-24 PROCEDURE — 36415 COLL VENOUS BLD VENIPUNCTURE: CPT | Performed by: NURSE PRACTITIONER

## 2021-08-24 PROCEDURE — 84484 ASSAY OF TROPONIN QUANT: CPT | Performed by: EMERGENCY MEDICINE

## 2021-08-24 PROCEDURE — 250N000009 HC RX 250: Performed by: NURSE PRACTITIONER

## 2021-08-24 PROCEDURE — 250N000013 HC RX MED GY IP 250 OP 250 PS 637: Performed by: STUDENT IN AN ORGANIZED HEALTH CARE EDUCATION/TRAINING PROGRAM

## 2021-08-24 PROCEDURE — 96368 THER/DIAG CONCURRENT INF: CPT

## 2021-08-24 PROCEDURE — 258N000003 HC RX IP 258 OP 636: Performed by: EMERGENCY MEDICINE

## 2021-08-24 PROCEDURE — 82803 BLOOD GASES ANY COMBINATION: CPT

## 2021-08-24 PROCEDURE — 71250 CT THORAX DX C-: CPT | Mod: 26

## 2021-08-24 PROCEDURE — 82805 BLOOD GASES W/O2 SATURATION: CPT | Performed by: NURSE PRACTITIONER

## 2021-08-24 PROCEDURE — 250N000013 HC RX MED GY IP 250 OP 250 PS 637: Performed by: NURSE PRACTITIONER

## 2021-08-24 PROCEDURE — 96367 TX/PROPH/DG ADDL SEQ IV INF: CPT

## 2021-08-24 PROCEDURE — 250N000012 HC RX MED GY IP 250 OP 636 PS 637: Performed by: NURSE PRACTITIONER

## 2021-08-24 PROCEDURE — 93005 ELECTROCARDIOGRAM TRACING: CPT

## 2021-08-24 PROCEDURE — 250N000011 HC RX IP 250 OP 636: Performed by: NURSE PRACTITIONER

## 2021-08-24 PROCEDURE — 96361 HYDRATE IV INFUSION ADD-ON: CPT

## 2021-08-24 PROCEDURE — 99292 CRITICAL CARE ADDL 30 MIN: CPT | Mod: 25

## 2021-08-24 PROCEDURE — 99291 CRITICAL CARE FIRST HOUR: CPT | Mod: 25

## 2021-08-24 PROCEDURE — 85379 FIBRIN DEGRADATION QUANT: CPT | Performed by: NURSE PRACTITIONER

## 2021-08-24 PROCEDURE — 99222 1ST HOSP IP/OBS MODERATE 55: CPT | Performed by: INTERNAL MEDICINE

## 2021-08-24 PROCEDURE — C9803 HOPD COVID-19 SPEC COLLECT: HCPCS

## 2021-08-24 PROCEDURE — 250N000013 HC RX MED GY IP 250 OP 250 PS 637: Performed by: EMERGENCY MEDICINE

## 2021-08-24 PROCEDURE — 83880 ASSAY OF NATRIURETIC PEPTIDE: CPT | Performed by: NURSE PRACTITIONER

## 2021-08-24 PROCEDURE — 83735 ASSAY OF MAGNESIUM: CPT | Performed by: STUDENT IN AN ORGANIZED HEALTH CARE EDUCATION/TRAINING PROGRAM

## 2021-08-24 PROCEDURE — 87799 DETECT AGENT NOS DNA QUANT: CPT | Performed by: NURSE PRACTITIONER

## 2021-08-24 PROCEDURE — 258N000003 HC RX IP 258 OP 636: Performed by: NURSE PRACTITIONER

## 2021-08-24 PROCEDURE — 120N000003 HC R&B IMCU UMMC

## 2021-08-24 PROCEDURE — 82040 ASSAY OF SERUM ALBUMIN: CPT | Performed by: EMERGENCY MEDICINE

## 2021-08-24 PROCEDURE — 71250 CT THORAX DX C-: CPT

## 2021-08-24 PROCEDURE — 99285 EMERGENCY DEPT VISIT HI MDM: CPT | Mod: 25

## 2021-08-24 PROCEDURE — XW033E5 INTRODUCTION OF REMDESIVIR ANTI-INFECTIVE INTO PERIPHERAL VEIN, PERCUTANEOUS APPROACH, NEW TECHNOLOGY GROUP 5: ICD-10-PCS | Performed by: INTERNAL MEDICINE

## 2021-08-24 PROCEDURE — 84550 ASSAY OF BLOOD/URIC ACID: CPT | Performed by: NURSE PRACTITIONER

## 2021-08-24 PROCEDURE — 71045 X-RAY EXAM CHEST 1 VIEW: CPT | Mod: 26 | Performed by: RADIOLOGY

## 2021-08-24 PROCEDURE — 999N000127 HC STATISTIC PERIPHERAL IV START W US GUIDANCE

## 2021-08-24 PROCEDURE — 87040 BLOOD CULTURE FOR BACTERIA: CPT | Performed by: EMERGENCY MEDICINE

## 2021-08-24 PROCEDURE — 99223 1ST HOSP IP/OBS HIGH 75: CPT | Mod: AI | Performed by: INTERNAL MEDICINE

## 2021-08-24 PROCEDURE — 84145 PROCALCITONIN (PCT): CPT | Performed by: EMERGENCY MEDICINE

## 2021-08-24 PROCEDURE — 250N000011 HC RX IP 250 OP 636: Performed by: EMERGENCY MEDICINE

## 2021-08-24 RX ORDER — ANAKINRA 100 MG/.67ML
INJECTION, SOLUTION SUBCUTANEOUS
Qty: 4.69 ML | Refills: 11 | Status: SHIPPED | OUTPATIENT
Start: 2021-08-24 | End: 2021-11-18

## 2021-08-24 RX ORDER — ALBUTEROL SULFATE 90 UG/1
2 AEROSOL, METERED RESPIRATORY (INHALATION) ONCE
Status: COMPLETED | OUTPATIENT
Start: 2021-08-24 | End: 2021-08-24

## 2021-08-24 RX ORDER — NICOTINE POLACRILEX 4 MG
15-30 LOZENGE BUCCAL
Status: DISCONTINUED | OUTPATIENT
Start: 2021-08-24 | End: 2021-09-02 | Stop reason: HOSPADM

## 2021-08-24 RX ORDER — GABAPENTIN 300 MG/1
300 CAPSULE ORAL 3 TIMES DAILY
Status: DISCONTINUED | OUTPATIENT
Start: 2021-08-24 | End: 2021-09-02 | Stop reason: HOSPADM

## 2021-08-24 RX ORDER — PANTOPRAZOLE SODIUM 40 MG/1
40 TABLET, DELAYED RELEASE ORAL
Status: DISCONTINUED | OUTPATIENT
Start: 2021-08-25 | End: 2021-09-02 | Stop reason: HOSPADM

## 2021-08-24 RX ORDER — NALOXONE HYDROCHLORIDE 0.4 MG/ML
0.2 INJECTION, SOLUTION INTRAMUSCULAR; INTRAVENOUS; SUBCUTANEOUS
Status: DISCONTINUED | OUTPATIENT
Start: 2021-08-24 | End: 2021-09-02 | Stop reason: HOSPADM

## 2021-08-24 RX ORDER — DEXTROSE MONOHYDRATE 25 G/50ML
25-50 INJECTION, SOLUTION INTRAVENOUS
Status: DISCONTINUED | OUTPATIENT
Start: 2021-08-24 | End: 2021-09-02 | Stop reason: HOSPADM

## 2021-08-24 RX ORDER — ALBUTEROL SULFATE 90 UG/1
2 AEROSOL, METERED RESPIRATORY (INHALATION) EVERY 6 HOURS PRN
Status: DISCONTINUED | OUTPATIENT
Start: 2021-08-24 | End: 2021-09-02 | Stop reason: HOSPADM

## 2021-08-24 RX ORDER — SENNOSIDES 8.6 MG
8.6 TABLET ORAL 2 TIMES DAILY PRN
Status: DISCONTINUED | OUTPATIENT
Start: 2021-08-24 | End: 2021-09-02 | Stop reason: HOSPADM

## 2021-08-24 RX ORDER — BUPROPION HYDROCHLORIDE 75 MG/1
75 TABLET ORAL 2 TIMES DAILY
Status: DISCONTINUED | OUTPATIENT
Start: 2021-08-24 | End: 2021-09-02 | Stop reason: HOSPADM

## 2021-08-24 RX ORDER — MONTELUKAST SODIUM 10 MG/1
10 TABLET ORAL AT BEDTIME
Status: DISCONTINUED | OUTPATIENT
Start: 2021-08-24 | End: 2021-09-02 | Stop reason: HOSPADM

## 2021-08-24 RX ORDER — POLYETHYLENE GLYCOL 3350 17 G/17G
17 POWDER, FOR SOLUTION ORAL DAILY PRN
Status: DISCONTINUED | OUTPATIENT
Start: 2021-08-24 | End: 2021-09-02 | Stop reason: HOSPADM

## 2021-08-24 RX ORDER — ACETAMINOPHEN 325 MG/1
650 TABLET ORAL
Status: DISCONTINUED | OUTPATIENT
Start: 2021-08-24 | End: 2021-09-02 | Stop reason: HOSPADM

## 2021-08-24 RX ORDER — TACROLIMUS 1 MG/1
4 CAPSULE ORAL
Status: DISCONTINUED | OUTPATIENT
Start: 2021-08-24 | End: 2021-09-02 | Stop reason: HOSPADM

## 2021-08-24 RX ORDER — CLOTRIMAZOLE 10 MG/1
1 LOZENGE ORAL 4 TIMES DAILY
Status: DISCONTINUED | OUTPATIENT
Start: 2021-08-24 | End: 2021-08-29

## 2021-08-24 RX ORDER — NALOXONE HYDROCHLORIDE 0.4 MG/ML
0.4 INJECTION, SOLUTION INTRAMUSCULAR; INTRAVENOUS; SUBCUTANEOUS
Status: DISCONTINUED | OUTPATIENT
Start: 2021-08-24 | End: 2021-09-02 | Stop reason: HOSPADM

## 2021-08-24 RX ORDER — ASPIRIN 81 MG/1
81 TABLET ORAL DAILY
Status: DISCONTINUED | OUTPATIENT
Start: 2021-08-24 | End: 2021-09-02 | Stop reason: HOSPADM

## 2021-08-24 RX ORDER — IBUPROFEN 600 MG/1
600 TABLET, FILM COATED ORAL
Status: DISCONTINUED | OUTPATIENT
Start: 2021-08-24 | End: 2021-08-24

## 2021-08-24 RX ORDER — ACETAMINOPHEN 650 MG/1
650 SUPPOSITORY RECTAL
Status: DISCONTINUED | OUTPATIENT
Start: 2021-08-24 | End: 2021-09-02 | Stop reason: HOSPADM

## 2021-08-24 RX ADMIN — CALCIUM CARBONATE 600 MG (1,500 MG)-VITAMIN D3 400 UNIT TABLET 1 TABLET: at 12:00

## 2021-08-24 RX ADMIN — ACETAMINOPHEN 650 MG: 325 TABLET, FILM COATED ORAL at 07:03

## 2021-08-24 RX ADMIN — ENOXAPARIN SODIUM 40 MG: 40 INJECTION SUBCUTANEOUS at 17:43

## 2021-08-24 RX ADMIN — MONTELUKAST 10 MG: 10 TABLET, FILM COATED ORAL at 22:22

## 2021-08-24 RX ADMIN — AMITRIPTYLINE HYDROCHLORIDE 25 MG: 25 TABLET, FILM COATED ORAL at 22:22

## 2021-08-24 RX ADMIN — INSULIN GLARGINE 15 UNITS: 100 INJECTION, SOLUTION SUBCUTANEOUS at 23:21

## 2021-08-24 RX ADMIN — TACROLIMUS 4 MG: 1 CAPSULE ORAL at 11:44

## 2021-08-24 RX ADMIN — CLOTRIMAZOLE 1 LOZENGE: 10 LOZENGE ORAL at 20:58

## 2021-08-24 RX ADMIN — REMDESIVIR 200 MG: 100 INJECTION, POWDER, LYOPHILIZED, FOR SOLUTION INTRAVENOUS at 17:47

## 2021-08-24 RX ADMIN — AZITHROMYCIN MONOHYDRATE 500 MG: 500 INJECTION, POWDER, LYOPHILIZED, FOR SOLUTION INTRAVENOUS at 07:04

## 2021-08-24 RX ADMIN — Medication 50 MG: at 22:22

## 2021-08-24 RX ADMIN — ASPIRIN 81 MG: 81 TABLET, COATED ORAL at 11:59

## 2021-08-24 RX ADMIN — TACROLIMUS 4 MG: 1 CAPSULE ORAL at 17:44

## 2021-08-24 RX ADMIN — CALCIUM CARBONATE 600 MG (1,500 MG)-VITAMIN D3 400 UNIT TABLET 1 TABLET: at 17:44

## 2021-08-24 RX ADMIN — CLOTRIMAZOLE 1 LOZENGE: 10 LOZENGE ORAL at 13:24

## 2021-08-24 RX ADMIN — IMDEVIMAB: 300 INJECTION, SOLUTION, CONCENTRATE INTRAVENOUS at 19:49

## 2021-08-24 RX ADMIN — SODIUM CHLORIDE 50 ML: 9 INJECTION, SOLUTION INTRAVENOUS at 18:46

## 2021-08-24 RX ADMIN — BUPROPION HYDROCHLORIDE 75 MG: 75 TABLET, FILM COATED ORAL at 20:58

## 2021-08-24 RX ADMIN — SODIUM CHLORIDE 1000 ML: 9 INJECTION, SOLUTION INTRAVENOUS at 06:56

## 2021-08-24 RX ADMIN — HYDROCORTISONE SODIUM SUCCINATE 100 MG: 100 INJECTION, POWDER, FOR SOLUTION INTRAMUSCULAR; INTRAVENOUS at 12:28

## 2021-08-24 RX ADMIN — CEFEPIME HYDROCHLORIDE 2 G: 2 INJECTION, POWDER, FOR SOLUTION INTRAVENOUS at 06:59

## 2021-08-24 RX ADMIN — CLOTRIMAZOLE 1 LOZENGE: 10 LOZENGE ORAL at 17:45

## 2021-08-24 RX ADMIN — GABAPENTIN 300 MG: 300 CAPSULE ORAL at 20:58

## 2021-08-24 RX ADMIN — ALBUTEROL SULFATE 2 PUFF: 90 AEROSOL, METERED RESPIRATORY (INHALATION) at 10:40

## 2021-08-24 RX ADMIN — GABAPENTIN 300 MG: 300 CAPSULE ORAL at 17:44

## 2021-08-24 RX ADMIN — Medication 10 MG: at 23:21

## 2021-08-24 ASSESSMENT — ACTIVITIES OF DAILY LIVING (ADL)
TOILETING_ISSUES: NO
NUMBER_OF_TIMES_PATIENT_HAS_FALLEN_WITHIN_LAST_SIX_MONTHS: 4
FALL_HISTORY_WITHIN_LAST_SIX_MONTHS: YES
EQUIPMENT_CURRENTLY_USED_AT_HOME: WALKER, STANDARD
HEARING_DIFFICULTY_OR_DEAF: NO
DIFFICULTY_EATING/SWALLOWING: NO
DOING_ERRANDS_INDEPENDENTLY_DIFFICULTY: YES
DIFFICULTY_COMMUNICATING: NO
VISION_MANAGEMENT: GLASSES AT HOME
PATIENT_/_FAMILY_COMMUNICATION_STYLE: SPOKEN LANGUAGE (ENGLISH OR BILINGUAL)
DRESSING/BATHING_DIFFICULTY: NO
CONCENTRATING,_REMEMBERING_OR_MAKING_DECISIONS_DIFFICULTY: NO
WEAR_GLASSES_OR_BLIND: YES
WALKING_OR_CLIMBING_STAIRS_DIFFICULTY: YES
WHICH_OF_THE_ABOVE_FUNCTIONAL_RISKS_HAD_A_RECENT_ONSET_OR_CHANGE?: FALL HISTORY
WALKING_OR_CLIMBING_STAIRS: AMBULATION DIFFICULTY, REQUIRES EQUIPMENT

## 2021-08-24 ASSESSMENT — MIFFLIN-ST. JEOR: SCORE: 1615.9

## 2021-08-24 NOTE — CONSULTS
[facility]    Transplant Infectious Diseases Inpatient Consultation      Jim Willingham MRN# [id number]   YOB: 1955 Age: 66 year old   Date of Admission and time: 8/24/2021  5:09 AM     Reason for consult: I was asked by Dr. Mccarty to evaluate this patient for COVID-19 infection.             Recommendations:   1. COVID-19 monoclonal AB x1.   2. Please discontinue hydrocortisone and prednisone unless you absolutely have to wean the steroids off slowly.   - I understand that we are stopping high dose prednisone but if you need 5-10 mg of prednisone for antirejection effect, this is OK from ID stand of point.   3. Remdesivir 200 mg x1 today followed 100 mg daily starting tomorrow for total of 5 days.   4. May use anakinra and other non-steroidal agents for gout/pseudogout control.   5. No indications for antibiotics as of now.   6. CT chest to follow up on RLL infiltrate vs cavity.         Summary of Presentation:   Transplants:  5/6/2021 (Heart), Postoperative day:  110     This patient is a 66 year old male with NICMP s/p LVAD in 2017 and OHT in 5/2021. Received basiliximab for induction. Currently on TAC/MMF (held due to COVID-19)/prednisone at high dose for gout/pseudogout.   Now with COVID-19 infection.         Active Problems and Infectious Diseases Issues:   1. COVID-19 infection.   Though the patient is on high dose prednisone for gout, I think this patient would greatly benefit from the monoclonal antibodies therapy if we can stop the high dose steroids and manage the gout with non-steroidal antiinflammatory agents including anakinra.   Would also benefit from remdesivir.   No evidence of bacterial pneumonia, but will continue to monitor as this patient is at risk for superimposed bacterial and fungal infection given high dose steroids and recent RLL pneumonia.         Old Problems and Infectious Diseases Issues:   1. RUPINDER hominis BSI in 9/2020 treated with vancomycin  for 6 weeks, presumed to be LVAD related.  2. MRSFARTUN in a hand joint in broth only deemed a contaminant on 7/8/2021 with inflamed joint due to acute pseudogout.   3. RLL pneumonia with exudative pleural effusion in 7/2021. The effusion was negative for fungal/bacterial/AFB cx. Treated with IV ABx then levaquin. Urine and serum Blasto and Histo Ag were negative, A galactomannan was negative for BD glucan was intermediate. No antifungal therapy was given with repeat CT 7/23/2021 showed improvement.    Other Infectious Disease issues include:  - QTc 420 as of 8/24/2021.   - PCP prophylaxis: bactrim and dapsone and most recently pentamidine on 7/11/2021 and 8/11/2021.   - Serostatus: CMV D+/R+, EBV D+/R+, HSV1+/2-, VZV +, Toxo D-/R-  - Immunization status: when the patient is more stable, he will need the prevnar then the pneumovax vaccines, also the shingrix vaccine and the COVID-19 vaccine but when he is not on such a high dose of prednisone.   - Gamma globulin status: 838 pre-transplant.       Thank you very much Dr. Mccarty for involving me in the care of Mr. Jim Willingham. Please do not hesitate to call me for any question.     Attestation:  I interviewed the patient and obtained history from the patient, and by reviewing the patient's chart including outside records, microbiological data, and radiological data. All data are summarized in this notes.  Catherine Grier MD  [facility]  Contact information available via [facility] Paging/Directory    08/24/2021             History of Present Illness:   Transplants:  5/6/2021 (Heart), Postoperative day:  110     This patient is a 66 year old male with NICMP s/p LVAD in 2017 then OHT in 5/2021.   Recently hospitalized in 6/2021 and 7/2021 with RLL pneumonia and effusion.   In 6/2021 he was admitted with cough, fever and shortness of breath and was treated with IV vancomycin and cefepime then zithromax was added and eventually the  patient was discharged on levaquin. He was readmitted in 7/2021 with fatigue, repeat CT showing cavity in the RLL and right effusion. The effusion was an exudate with negative fungal, bacteria, and AFB. Urine and serum Blasto and Histo Ag were negative, A galactomannan was negative for BD glucan was intermediate. No antifungal therapy was given with repeat CT 7/23/2021 showed improvement.    He also suffered from pseudogout treated with high dose steroids.     The patient started to experience fever and shortness 2 days ago. He presented to ED 8/24/2021 with the same and was found with COVID-19. ID consulted.   In the ED he started to cough and experience diarrhea of softer stool.             Review of Systems:      As mentioned in the HPI otherwise negative by reviewing constitutional symptoms, central and peripheral neurological systems, respiratory system, cardiac system, GI system,  system, musculoskeletal, skin, allergy, and lymphatics.                  Past Medical History:     Past Medical History:   Diagnosis Date     Bariatric surgery status 2003     Benign essential hypertension 05/11/2017     Bilateral carpal tunnel syndrome 11/02/2020     Cardiomyopathy, unspecified (H) 05/08/2017     CKD (chronic kidney disease) stage 3, GFR 30-59 ml/min 05/11/2017     COPD (chronic obstructive pulmonary disease) (H) 11/02/2020     Depression 05/11/2017     Diabetes mellitus (H) 1995     Gouty arthropathy, chronic, without tophi 11/02/2020     H/O gastric bypass 05/11/2017     ICD (implantable cardioverter-defibrillator), biventricular, in situ 05/11/2017     LVAD (left ventricular assist device) present (H)      Major depression, recurrent, chronic (H) 11/02/2020     NICM (nonischemic cardiomyopathy) (H)/ EF 20% 05/11/2017    ECHO: LVEDd. 7.66 cm, Restrictive pattern , Severe mitral valve regurgitation     CECILIA (obstructive sleep apnea) 05/11/2017     Paroxysmal atrial fibrillation (H) 05/11/2017     Paroxysmal VT (H)  05/11/2017     Rotator cuff tear arthropathy of both shoulders 11/02/2020     Uncomplicated asthma      Vitamin B12 deficiency (non anemic) 05/11/2017            Past Surgical History:     Past Surgical History:   Procedure Laterality Date     ANESTHESIA CARDIOVERSION N/A 05/11/2020    Procedure: ANESTHESIA, FOR CARDIOVERSION @1100;  Surgeon: GENERIC ANESTHESIA PROVIDER;  Location: [facility] OR     CV HEART BIOPSY N/A 5/13/2021    Procedure: Heart Biopsy;  Surgeon: Scout Robins MD;  Location: [facility] HEART CARDIAC CATH LAB     CV HEART BIOPSY N/A 5/20/2021    Procedure: Heart Biopsy;  Surgeon: Jeffrey Gibson MD;  Location: [facility] HEART CARDIAC CATH LAB     CV HEART BIOPSY N/A 5/27/2021    Procedure: Heart Biopsy;  Surgeon: Jac Dover MD;  Location: [facility] HEART CARD[facility]AC CATH [facility]     CV HEART BIOPSY N/A 6/7/2021    Procedure: CV HEART BIOPSY;  Surgeon: Jeffrey Gibson MD;  Location: [facility] HEART CARD[facility]AC CATH LAB     CV HEART BIOPSY N/A 6/21/2021    Procedure: CV HEART BIOPSY;  Surgeon: Scout Robins MD;  [facility]: [facility]     CV HEART BIOPSY N/A 7/5/2021    Procedure: CV HEART BIOPSY;  Surgeon: Jeffrey Gibson MD;  [facility]: [facility]ARD[facility]AC [facility]     CV HEART BIOPSY N/A 7/16/2021    Procedure: Heart Biopsy;  Surgeon: Amadeo Art MD;  [facility]: [facility]     CV HEART BIOPSY N/A 8/5/2021    Procedure: Heart Biopsy;  Surgeon: Jeffrey Gibson MD;  Location: [facility] HEART [facility]ARD[facility]AC [facility]ATH LAB     CV RIGHT HEART CATH MEASUREMENTS RECORDED N/A 07/24/2019    Procedure: CV RIGHT HEART CATH;  Surgeon: Renu Sears MD;  Location: [facility]AC [facility]     CV RIGHT HEART CATH MEASUREMENTS RECORDED N/A 08/05/2020    Procedure: CV RIGHT HEART CATH;  Surgeon: Nicola Seth MD;  Location: [facility]     CV RIGHT HEART CATH MEASUREMENTS RECORDED N/A 01/07/2021    Procedure: CV RIGHT HEART CATH;  Surgeon:  Jac Dover MD;  Location:  HEART CARDIAC CATH LAB     CV RIGHT HEART CATH MEASUREMENTS RECORDED N/A 02/23/2021    Procedure: Heart Cath Right Heart Cath;  Surgeon: Jeffrey Gibson MD;  Location:  HEART CARDIAC CATH LAB     CV RIGHT HEART CATH MEASUREMENTS RECORDED N/A 03/23/2021    Procedure: Heart Cath Right Heart Cath. request for 3/23;  Surgeon: Jeffrey Gibson MD;  Location:  HEART CARDIAC CATH LAB     CV RIGHT HEART CATH MEASUREMENTS RECORDED N/A 5/13/2021    Procedure: Right Heart Cath;  Surgeon: Scout Robins MD;  Location:  HEART CARDIAC CATH LAB     CV RIGHT HEART CATH MEASUREMENTS RECORDED N/A 5/20/2021    Procedure: Right Heart Cath;  Surgeon: Jeffrey Gibson MD;  Location:  HEART CARDIAC CATH LAB     CV RIGHT HEART CATH MEASUREMENTS RECORDED N/A 5/27/2021    Procedure: Right Heart Cath;  Surgeon: Jac Dover MD;  Location:  HEART CARDIAC CATH LAB     CV RIGHT HEART CATH MEASUREMENTS RECORDED N/A 6/7/2021    Procedure: CV RIGHT HEART CATH;  Surgeon: Jeffrey Gibson MD;  Location:  HEART CARDIAC CATH LAB     CV RIGHT HEART CATH MEASUREMENTS RECORDED N/A 6/21/2021    Procedure: CV RIGHT HEART CATH;  Surgeon: Scout Robins MD;  Location:  HEART CARDIAC CATH LAB     CV RIGHT HEART CATH MEASUREMENTS RECORDED N/A 7/5/2021    Procedure: CV RIGHT HEART CATH;  Surgeon: Jeffrey Gibson MD;  Location:  HEART CARDIAC CATH LAB     CV RIGHT HEART CATH MEASUREMENTS RECORDED N/A 7/16/2021    Procedure: Right Heart Cath;  Surgeon: Amadeo Art MD;  Location:  HEART CARDIAC CATH LAB     CV RIGHT HEART CATH MEASUREMENTS RECORDED N/A 8/5/2021    Procedure: CV RIGHT HEART CATH;  Surgeon: Jeffrey Gibson MD;  Location:  HEART CARDIAC CATH LAB     GI SURGERY  2003    [facility]     INSERT VENTRICULAR ASSIST DEVICE LEFT (HEARTMATE II) N/A 06/19/2017    Procedure: INSERT  VENTRICULAR ASSIST DEVICE LEFT (HEARTMATE II);  Median Sternotomy Heartmate II Left Ventricular Assist Device Insertion on Pump Oxygenator;  Surgeon: Ronnie Quigley MD;  Location: [facility] OR     IR CHEST TUBE PLACEMENT NON-TUNNELED RIGHT  2021     ORTHOPEDIC SURGERY  1994    right knee wired     PICC DOUBLE LUMEN PLACEMENT Right 2020    5FR PICC DL. Length-43cm (1cm out).     PICC INSERTION - DOUBLE LUMEN Right 2021    IK/BRACH     RELEASE CARPAL TUNNEL BILATERAL Bilateral 2021    Procedure: Bilateral carpal tunnel release;  Surgeon: Jermaine Brand MD;  Location: [facility] OR     TRANSPLANT HEART RECIPIENT N/A 2021    Procedure: Redo median sternotomy, lysis of adhesions, heart transplant recipient, on cardiopulmonary bypass, intraoperative transesophageal echocardiogram per anesthesia, Implantable Cardioverter Defibrillator (ICD) removal;  Surgeon: Ronnie Quigley MD;  Location: [facility] OR            Social History:     Social History     Tobacco Use     Smoking status: Former Smoker     Quit date:      Years since quittin.6     Smokeless tobacco: Never Used   Substance Use Topics     Alcohol use: No            Family History:   I have reviewed this patient's family history  Family History   Problem Relation Age of Onset     Cerebrovascular Disease Mother 64     Diabetes Mother      Hypertension Mother      Coronary Artery Disease Father      Diabetes Type 2  Father      Obesity Brother      Obesity Brother      Cerebrovascular Disease Daughter 40            Immunizations:     Immunization History   Administered Date(s) Administered     Influenza Quad, Recombinant, p-free (RIV4) 2020     Influenza, Quad, High Dose, Pf, 65yr + 2020     Pneumo Conj 13-V (2010&after) 2015     Tdap (Adacel,Boostrix) 2020            Allergies:     Allergies   Allergen Reactions     Grass Shortness Of Breath     Ace Inhibitors Cough     Dust Mites Other (See Comments)     Asthma     Mold Other  (See Comments)     Asthma     Penicillins Other (See Comments)     Unknown - childhood exposure    Tolerated Zosyn 9/18-9/20/2020     Sulfa Drugs Other (See Comments) and Unknown     Unknown childhood reaction             Medications:   Medications that Require Transfusion:     ACE/ARB/ARNI NOT PRESCRIBED       BETA BLOCKER NOT PRESCRIBED       Scheduled Medications:     aspirin  81 mg Oral Daily     calcium carbonate 600 mg-vitamin D 400 units  1 tablet Oral BID w/meals     clotrimazole  1 lozenge Buccal 4x Daily     hydrocortisone sodium succinate PF  100 mg Intravenous Once     sodium chloride (PF)  3 mL Intracatheter Q8H     tacrolimus  4 mg Oral BID IS            Physical Exam:   Temp: 98.3  F (36.8  C) Temp src: Oral BP: 124/86 Pulse: (!) 124   Resp: 22 SpO2: 99 % O2 Device: Nasal cannula Oxygen Delivery: 2 LPM    Wt Readings from Last 4 Encounters:   08/24/21 85.1 kg (187 lb 11.2 oz)   08/05/21 93.6 kg (206 lb 4.8 oz)   07/18/21 89.4 kg (197 lb 3.2 oz)   07/05/21 88.7 kg (195 lb 9.6 oz)     Constitutional: awake, alert, cooperative, no apparent distress and appears at stated age, well nourished.   Head, ENT, Eyes, and Neck: Normocephalic, sinuses non-tender to palpation, external ears without lesions, moist buccal mucosa without oral thrush or ulcers, tonsils without swelling, erythema, or exudate, no tenderness palpating teeth, good dentition, gums without necrosis or abscesses.   PERRL, EOMI, pink conjunctivae, non-icteric sclera.   Neck supple without rigidity, no cervical/axillary/inguinal LA bilaterally.    Neurologic: Patient is moving all extremities without focal deficit, no focal sensory loss.   Lungs: CTA bilaterally, no accessory muscle use, no dullness to percussion and no abnormal tactile fremitus.   CVS: RRR, normal S1/S2, no murmur, PMI was not displaced.   Abdomen: non-tender, non-distended, no masses, no bruit, no shifting dullness, normal BS.   Extremities: no pitting edema of bilateral  lower extremities, no ulcers, the right arm and hand are more swollen with limited ROM of the right hand joints compared to the LUE, without erythema.   Skin: no induration, fluctuation or discharge, and no rash            Data:   No results found for: ACD4    Inflammatory Markers    Recent Labs   Lab Test 07/19/21  0630 07/18/21  0552 07/16/21  0614 07/09/21  0632 07/08/21  1746 07/08/21  1533 06/28/21  2208 05/27/21  0449 05/26/21  0705 10/27/20  1250 10/20/20  1305 10/13/20  1320 10/06/20  1320 09/30/20  1130 04/13/18  1437 05/26/17  0700   SED  --   --   --   --  72*  --   --   --   --  10 8 10 20 9 18 25*   CRP 13.0* 7.7 4.2 98.0*  --  87.0* 27.0* 140.0* 220.0* 3.5 11.0* 12.0* 5.7 0.6* 9.4*  --        Immune Globulin Studies     Recent Labs   Lab Test 12/21/20  1133      IGM 55          Metabolic Studies       Recent Labs   Lab Test 08/24/21  0613 08/24/21  0559 08/23/21  0918 08/17/21  1015 08/09/21  0910 08/05/21  0931 08/02/21  0850 07/19/21  0630 07/09/21  0632 07/08/21  1533 07/01/21  0644 06/30/21  0339 06/24/21  0905 06/21/21  0933 06/07/21  0807 05/05/21  0628 05/04/21  2313     --  135 134 139 137 139 136   < > 134  --  130*  --  131* 140  --  135   POTASSIUM 4.1  --  4.0 4.5 3.7 4.3 4.0 4.9   < > 4.6  --  4.8  --  4.8 4.2  --  4.0   CHLORIDE 106  --  103 104 108 107 105 109   < > 104  --  101  --  97 105  --  101   CO2 26  --  27 25 25 24 24 24   < > 22  --  25  --  26 30  --  26   ANIONGAP 4  --  5 5 6 6 10 3   < > 7  --  4  --  8 5  --  7   BUN 32*  --  34* 51* 23 27 22 27   < > 37*  --  47*  --  46* 41*  --  50*   CR 1.91*  --  1.99* 2.28* 1.44* 1.45* 1.11 1.26*   < > 2.37*  --  2.24*  --  2.31* 1.93*  --  1.60*   GFRESTIMATED 36*  --  34* 29* 51* 51* 70 60*   < > 28*  --  30*  --  29* 36*  --  45*   *  --  172* 149* 94 85 71 90   < > 133*  --  157*  --  285* 128*  --  115*   A1C  --   --   --   --   --   --   --   --   --   --   --   --   --   --   --   --  5.1   QAMAR  8.0*  --  8.3* 8.1* 8.2* 8.2* 8.1* 7.9*   < > 7.9*  --  8.3*  --  8.6 8.2*  --  9.1   PHOS  --   --   --   --   --  2.4*  --   --   --   --   --  2.8  --  3.7 3.7   < > 3.8   MAG  --   --   --   --   --  1.8  --  2.0   < >  --    < > 2.2  --  1.9 1.7  --  2.3   LACT  --  0.8  --   --   --   --   --   --   --  1.0  --  0.7   < >  --   --    < >  --    CKT  --   --   --   --   --   --   --   --   --   --   --   --   --  31 49  --   --     < > = values in this interval not displayed.       Hepatic Studies    Recent Labs   Lab Test 08/24/21 0613 08/05/21  0931 07/27/21  1027 07/19/21  0630 07/18/21  0552 07/17/21  0626 07/13/21  2053 07/10/21  0624   BILITOTAL 0.6 0.5 0.4 0.3 0.3 0.4  --   --    ALKPHOS 122 281* 516* 537* 577* 573*  --   --    ALBUMIN 2.6* 2.9* 3.0* 2.2* 2.2* 2.4*  --   --    AST 10 12 41 118* 211* 162*  --   --    ALT 17 36 152* 233* 278* 273*  --   --    LDH  --   --   --   --   --   --  252* 220       Pancreatitis testing    Recent Labs   Lab Test 05/26/21  1340 05/04/21  2313 11/05/20  0907 08/05/20  0335 07/06/18  0856 06/08/17  0944   AMYLASE 49 93  --   --   --   --    LIPASE 25*  --   --  168  --   --    TRIG  --   --  91  --  123 124       Hematology Studies      Recent Labs   Lab Test 08/24/21 0613 08/23/21  0918 08/17/21  1015 08/09/21  0910 08/05/21  1247 08/05/21  0931 08/02/21  0850 07/27/21  1027 07/09/21  0632 07/08/21  1746 07/08/21  1533 07/07/21  0858   WBC 5.1 5.1 6.1 4.1  --  4.2 4.2 4.6   < > 8.3 Canceled, Test credited 5.6   ANEU 4.0  --   --   --   --  2.6  --  2.6  --  7.7 Canceled, Test credited 3.5   ALYM 0.4*  --   --   --   --  1.2  --  1.5  --  0.3* Canceled, Test credited 1.3   VIJAY 0.6  --   --   --   --  0.2  --  0.5  --  0.1 Canceled, Test credited 0.5   AEOS 0.0  --   --   --   --  0.1  --  0.1  --  0.0 Canceled, Test credited 0.1   HGB 8.8* 8.9* 9.0* 8.8* 9.0* 9.2* 9.2* 8.6*   < > 8.9* Canceled, Test credited 9.4*   HCT 28.9* 29.7* 29.5* 29.1*  --  31.4* 30.9* 29.2*    < > 28.9* Canceled, Test credited 30.9*    328 342 284  --  280 293 232   < > 281 Canceled, Test credited 276    < > = values in this interval not displayed.       Clotting Studies    Recent Labs   Lab Test 07/19/21  0630 07/18/21  0552 07/17/21  0626 07/16/21  0614 05/07/21  0340 05/06/21  0650 05/06/21  0426 05/04/21  2313 09/17/20  1554   INR 1.16* 1.11 1.12 1.07   < > 1.45* 2.02* 2.00* 3.68*   PTT  --   --   --   --   --  35 26 37 51*    < > = values in this interval not displayed.       Arterial Blood Gas Testing    Recent Labs   Lab Test 08/24/21  1142 08/24/21  0559 06/28/21  2247 05/09/21  0956 05/09/21  0902 05/07/21  1900 05/07/21  1700 05/07/21  1420 05/07/21  0340 05/06/21  2152   PH  --  7.40  --   --   --  7.40 7.38 7.39 7.38 7.37   PCO2  --   --   --   --   --  38 41 40 41 44   PO2  --   --   --   --   --  101 102 130* 153* 148*   HCO3  --   --   --   --   --  23 24 24 24 25   O2PER 99  --  21.0 21  21 REPORT AMENDED: 2LNC Canceled, Test credited  2L 40 40.0  40.0 40  40        Urine Studies     Recent Labs   Lab Test 08/24/21  0726 07/09/21  0640 06/30/21  0640 05/25/21  1230 05/16/21  0528   URINEPH 7.0 5.5 5.0 5.5 5.5   NITRITE Negative Negative Negative Negative Negative   LEUKEST Negative Negative Negative Negative Negative   WBCU 2 3 4 4 1       Vancomycin Levels     Recent Labs   Lab Test 05/08/21 2004 05/07/21  1701 10/27/20  1250 10/20/20  1305 10/13/20  1320 10/06/20  1320   VANCOMYCIN 18.5 16.0 19.3 16.5 13.4 16.1       Tobramycin levels     No lab results found.    Gentamicin levels    No lab results found.    Tacrolimus levels    Invalid input(s): TACROLIMUS, TAC, TACR  Transplant Immunosuppression Labs Latest Ref Rng & Units 8/24/2021 8/23/2021 8/17/2021 8/9/2021 8/5/2021   Tacro Level 5.0 - 15.0 ug/L - 6.0 13.8 15.6(H) 5.8   Tacro Level - - - - - -   Creat 0.66 - 1.25 mg/dL 1.91(H) 1.99(H) 2.28(H) 1.44(H) 1.45(H)   BUN 7 - 30 mg/dL 32(H) 34(H) 51(H) 23 27   WBC 4.0 - 11.0  10e3/uL 5.1 5.1 6.1 4.1 4.2   Neutrophil % 79 - - - 61   ANEU 1.6 - 8.3 10e3/uL 4.0 - - - 2.6       Microbiology:  Blood cx 8/24/2021 pending.   Last check of C difficile  No results found for: CDBPCT    Virology:  CMV viral loads    CMV Quant IU/mL   Date Value Ref Range Status   06/29/2021 CMV DNA Not Detected CMVND^CMV DNA Not Detected [IU]/mL Final     Comment:     Mutations within the highly conserved regions of the viral genome covered by   the KEENA AmpliPrep/KEENA TaqMan CMV Test primers and/or probes have been   identified and may result in under-quantitation of or failure to detect the   virus.  Supplemental testing methods should be used for testing when this is   suspected.  The KEENA AmpliPrep/KEENA TaqMan CMV Test is an FDA-approved in vitro nucleic   acid amplification test for the quantitation of cytomegalovirus DNA in human   plasma (EDTA plasma) using the KEENA AmpliPrep Instrument for automated viral   nucleic acid extraction and the Stremor TaqMan Analyzer or Stremor TaqMan for   automated Real Time amplification and detection of the viral nucleic acid   target.  Titer results are reported in International Units/mL (IU/mL using 1st WHO   International standard for Human Cytomegalovirus for Nucleic Acid   Amplification based assays. The conversion factor between CMV DNA copis/mL (as   defined by the Roche KEENA TaqMan CMV test) and International Units is the   CMV DNA concentration in IU/mL x 1.1 copies/IU = CMV DNA in copies/mL.  This assay has received FDA approval for the testing of human plasma only. The   Infectious Disease Diagnostic Laboratory at the [facility], [place], has validated the performance characteristics of the   Roche CMV assay for plasma, bronchial alveolar lavage/wash and urine.     06/28/2021 Canceled, Test credited CMVND^CMV DNA Not Detected [IU]/mL Final     Comment:     Quantity not sufficient  NOTIFIED VIA ED TRACK BOARD AT 9070 ON 6/28/21  ELK     06/07/2021 CMV DNA Not Detected CMVND^CMV DNA Not Detected [IU]/mL Final     Comment:     Mutations within the highly conserved regions of the viral genome covered by   the KEENA AmpliPrep/KEENA TaqMan CMV Test primers and/or probes have been   identified and may result in under-quantitation of or failure to detect the   virus.  Supplemental testing methods should be used for testing when this is   suspected.  The KEENA AmpliPrep/KEENA TaqMan CMV Test is an FDA-approved in vitro nucleic   acid amplification test for the quantitation of cytomegalovirus DNA in human   plasma (EDTA plasma) using the KEENA AmpliPrep Instrument for automated viral   nucleic acid extraction and the KEENA TaqMan Analyzer or FIZZA TaqMan for   automated Real Time amplification and detection of the viral nucleic acid   target.  Titer results are reported in International Units/mL (IU/mL using 1st WHO   International standard for Human Cytomegalovirus for Nucleic Acid   Amplification based assays. The conversion factor between CMV DNA copis/mL (as   defined by the Roche KEENA TaqMan CMV test) and International Units is the   CMV DNA concentration in IU/mL x 1.1 copies/IU = CMV DNA in copies/mL.  This assay has received FDA approval for the testing of human plasma only. The   Infectious Disease Diagnostic Laboratory at the [facility], [place], has validated the performance characteristics of the   Roche CMV assay for plasma, bronchial alveolar lavage/wash and urine.       CMV DNA IU/mL   Date Value Ref Range Status   07/13/2021 Not Detected Not Detected IU/mL Final     CMV viral loads    Recent Labs   Lab Test 07/13/21 2053 06/29/21  1412 06/28/21  2208 06/07/21  0807   CMVQNT Not Detected CMV DNA Not Detected Canceled, Test credited CMV DNA Not Detected   CSPEC  --  Plasma Canceled, Test credited Plasma   CMVLOG  --  Not Calculated Canceled, Test credited Not Calculated       CMV viral loads    CMV  Quant IU/mL   Date Value Ref Range Status   06/29/2021 CMV DNA Not Detected CMVND^CMV DNA Not Detected [IU]/mL Final     Comment:     Mutations within the highly conserved regions of the viral genome covered by   the KEENA AmpliPrep/KEENA TaqMan CMV Test primers and/or probes have been   identified and may result in under-quantitation of or failure to detect the   virus.  Supplemental testing methods should be used for testing when this is   suspected.  The KEENA AmpliPrep/KEENA TaqMan CMV Test is an FDA-approved in vitro nucleic   acid amplification test for the quantitation of cytomegalovirus DNA in human   plasma (EDTA plasma) using the KEENA AmpliPrep Instrument for automated viral   nucleic acid extraction and the 6Scan TaqMan Analyzer or Retrotope for   automated Real Time amplification and detection of the viral nucleic acid   target.  Titer results are reported in International Units/mL (IU/mL using 1st WHO   International standard for Human Cytomegalovirus for Nucleic Acid   Amplification based assays. The conversion factor between CMV DNA copis/mL (as   defined by the Roche KEENA TaqMan CMV test) and International Units is the   CMV DNA concentration in IU/mL x 1.1 copies/IU = CMV DNA in copies/mL.  This assay has received FDA approval for the testing of human plasma only. The   [facility] at the [facility], [place], has validated the performance characteristics of the   Roche CMV assay for plasma, bronchial alveolar lavage/wash and urine.     06/28/2021 Canceled, Test credited CMVND^CMV DNA Not Detected [IU]/mL Final     Comment:     Quantity not sufficient  NOTIFIED VIA ED TRACK BOARD AT 2355 ON 6/28/21 EL[facility]     06/07/2021 CMV DNA Not Detected CMVND^CMV DNA Not Detected [IU]/mL Final     Comment:     Mutations within the highly conserved regions of the viral genome covered by   the KEENA AmpliPrep/KEENA TaqMan CMV Test primers and/or probes  have been   identified and may result in under-quantitation of or failure to detect the   virus.  Supplemental testing methods should be used for testing when this is   suspected.  The KEENA AmpliPrep/KEENA TaqMan CMV Test is an FDA-approved in vitro nucleic   acid amplification test for the quantitation of cytomegalovirus DNA in human   plasma (EDTA plasma) using the KEENA AmpliPrep Instrument for automated viral   nucleic acid extraction and the KEENA TaqMan Analyzer or KEENA TaqMan for   automated Real Time amplification and detection of the viral nucleic acid   target.  Titer results are reported in International Units/mL (IU/mL using 1st WHO   International standard for Human Cytomegalovirus for Nucleic Acid   Amplification based assays. The conversion factor between CMV DNA copis/mL (as   defined by the Roche KEENA TaqMan CMV test) and International Units is the   CMV DNA concentration in IU/mL x 1.1 copies/IU = CMV DNA in copies/mL.  This assay has received FDA approval for the testing of human plasma only. The   [facility] Laboratory at the [facility], [place], has validated the performance characteristics of the   Roche CMV assay for plasma, bronchial alveolar lavage/wash and urine.       CMV DNA IU/mL   Date Value Ref Range Status   07/13/2021 Not Detected Not Detected IU/mL Final     Log IU/mL of CMVQNT   Date Value Ref Range Status   06/29/2021 Not Calculated <2.1 [Log_IU]/mL Final   06/28/2021 Canceled, Test credited <2.1 [Log_IU]/mL Final     Comment:     Quantity not sufficient  NOTIFIED VIA ED TRACK BOARD AT 3264 ON 6/28/21 ELK     06/07/2021 Not Calculated <2.1 [Log_IU]/mL Final       CMV resistance testing  No lab results found.  No results found for: CMVCID, CMVFOS, CMVGAN     COVID-19 PCR Results    COVID-19 PCR Results 5/4/21 5/12/21 5/20/21 6/12/21 6/12/21 6/17/21 6/17/21 6/28/21 7/8/21 7/17/21 8/3/21 8/24/21       [id number]         COVID-19 Virus PCR to [facility] - Result    Test received-See reflex to IDDL test SARS CoV2 (COVID-19) Virus RT-PCR  Test received-See reflex to IDDL test SARS CoV2 (COVID-19) Virus RT-PCR         COVID-19 Virus PCR to [facility] - Source    Nasopharyngeal  Nasopharyngeal         SARS-CoV-2 Virus Specimen Source Nasopharyngeal Nasopharyngeal Nasopharyngeal  Nasopharyngeal  Nasopharyngeal Nasopharyngeal Nasopharyngeal      Flu A/B & SARS-COV-2 PCR Source               SARS-CoV-2 PCR Result NEGATIVE NEGATIVE NEGATIVE  NEGATIVE  NEGATIVE NEGATIVE NEGATIVE      SARS CoV2 PCR          Negative Negative Positive (A)   (A) Abnormal value       Comments are available for some flowsheets but are not being displayed.             No results found for: H6RES    EBV DNA Copies/mL   Date Value Ref Range Status   08/05/2021   Final     Comment:     EBV DNA Detetected below the reportable range of 500 copies/mL   06/28/2021 EBV DNA Not Detected EBVNEG^EBV DNA Not Detected [Copies]/mL Final   06/07/2021 3,509 (A) EBVNEG^EBV DNA Not Detected [Copies]/mL Final       CMV Antibody IgG   Date Value Ref Range Status   05/04/2021 >8.0 (H) 0.0 - 0.8 AI Final     Comment:     Positive  Antibody index (AI) values reflect qualitative changes in antibody   concentration that cannot be directly associated with clinical condition or   disease state.     09/10/2020 >8.0 (H) 0.0 - 0.8 AI Final     Comment:     Positive  Antibody index (AI) values reflect qualitative changes in antibody   concentration that cannot be directly associated with clinical condition or   disease state.         Toxoplasma Antibody IgG   Date Value Ref Range Status   09/10/2020 <3.0 0.0 - 7.1 IU/mL Final     Comment:     Negative- Absence of detectable Toxoplasma gondii IgG antibodies. A negative   result does not rule out acute infection.  The magnitude of the measured result is not indicative of the amount of   antibody present. The concentrations of anti-Toxoplasma gondii  IgG in a given   specimen determined with assays from different manufacturers can vary due to   differences in assay methods and reagent specificity.           Imaging:  CXR 8/24/2021 reviewed by myself.   IMPRESSION: Small cavitary nodule of the medial right lower lobe on prior CTA chest not well seen by radiographic technique. Little change in scarring, pleural thickening, and small pleural effusion of the mid and lower right lung. Left lung largely   clear. Poststernotomy and coronary artery bypass grafting. 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 No visible pneumothorax.    CT c/a/p 7/23/2021 with contrast  IMPRESSION:   1. Decreased right lower lobe cavitary lesion, with nearly resolved  right basilar consolidative opacities. Stable to slightly increased  scattered infectious peribronchovascular nodular and tree-in-bud  opacities.  Continued attention on followup recommended.  2. Grossly similar anterior mediastinal fluid collection as above.  This is favored to represent postoperative seroma given minimal change  compared to noncontrast chest CT and lack of significant associated  inflammatory fat stranding. This was previously aspirated on  7/15/2021, recommend correlating with these results as well as  patient's clinical status.  3. Decreased small residual right hydropneumothorax.  4. Continued bibasilar subsegmental atelectasis and predominately  right basilar bronchiectasis.  5. Cholelithiasis. Mild pericholecystic fluid is nonspecific and can  be seen in the setting of volume overload. No gallbladder wall  thickening.        Catherine Grier MD  [facility]  Contact information available via [facility] Paging/Directory     08/24/2021

## 2021-08-24 NOTE — PROGRESS NOTES
[facility]   Patient is currently open to home care services with [facility]. The patient is currently receiving RN PT OT services. [facility]  and team have been notified of patient admission. [facility] liaison will continue to follow patient during stay. If appropriate provide orders to resume home care at time of discharge.    Debi Meier RN BSN  [facility] Liaison  [phone]

## 2021-08-24 NOTE — ED NOTES
Bed: ED24  Expected date:   Expected time:   Means of arrival:   Comments:  [facility] [address]23  66M cough, fever SOB

## 2021-08-24 NOTE — TELEPHONE ENCOUNTER
[facility] called for additional information - called Juan M [facility] back at [phone].  Spoke to Dr. Pettit - he is okay with 7 day fills at a time - relayed to Juan M at [facility] about 7 day fills and he states that he will approve.  There is no limit to how many times [place] can refill RX.  Routing to provider for new RX outlining 7 syringes at a time with appropriate number of refills.

## 2021-08-24 NOTE — ED TRIAGE NOTES
[facility]21  66M cough, fever SOB   BIBA from home, asleep around 0100 severe SOB, COPD asthma, used inhaler at home x2, no home neb, supplemental O2, 98%, sat up breathing better, tempo 99.0 forehead. Cold for last couple day, unproductive cough, duoned in route, tach mid 130's 110's/x

## 2021-08-24 NOTE — Clinical Note
dry, intact, no bleeding and no hematoma. 6 Fr sheath removed from the right AC vein. Manual pressure held. Hemostasis obtained. Band aid applied.

## 2021-08-24 NOTE — H&P
[facility]   Cardiology II Service / Advanced Heart Failure  History & Physical      I personally saw and examined this patient with ANP and agree with assessment and plan to treat this  66 year old transplant recipient admitted with abrupt shortnes of breath and positve test for COVID.  Jim Vega  : 1955  MRN # [id number]    ADMIT DATE: 2021    PCP: Ricardo Gómez    CHIEF COMPLAINT: shortness of breath, headache    HPI:   Mr. Jim Willingham is a 66yr old male with a history of NICM (s/p HM2 LVAD 2017), VT, AFib, CKD, DMII, COPD, now s/p OHT 21, who presented to the ER with shortness of breath and headache.    He reports that he started feeling SOB and developed a low-grade fever about 2-3 days ago.  He awoke this morning at ~1am feeling extremely short of breath.  His temp has been running 98-99 .  He also has had a cough, congestion, and sore throat.  He has not been going out much, and has not had any ill contacts that he is aware of.  He has not been active and has been unable to walk much due to his gout.  His gout pain has not improved since his initial prednisone burst.  He has not had any recent Anakinra.  He states that the gout pain started in his fingers and has now spread up to his elbow.  His appetite is not great, but he is forcing himself to eat regularly.  He denies nausea, vomiting, diarrhea, and constipation.  His weight is down, was 187#, and he denies fluid retention.  He is not taking diuretics.  He notes stable BPs when checked.  He fell last week and hit his head on a dresser, and notes ongoing, intermittent headaches since.  He otherwise denies chest pain, palpitations, dizziness, acute vision changes, and signs of bleeding.    While in the ED, he was found to be febrile (101.4 ) and tachycardic (HRs 130s).  CXR was negative for acute changes.  Labs showed stable electrolytes, renal function, liver function, and blood counts.  Troponin was  negative, EKG showed sinus tachycardia but no other changes.  Procal negative, lactic acid 0.8, BCs pending.  He given IV fluids and started on cefepime and azithromycin for possible sepsis, and was then found to be positive for COVID-19.  He was consequently admitted to the Cards2 service for further care.    ROS:   A complete review of systems was performed with pertinent positives and negatives noted in the HPI, and all other systems negative.    PMH:  Past Medical History:   Diagnosis Date     Bariatric surgery status 2003     Benign essential hypertension 05/11/2017     Bilateral carpal tunnel syndrome 11/02/2020     Cardiomyopathy, unspecified (H) 05/08/2017     CKD (chronic kidney disease) stage 3, GFR 30-59 ml/min 05/11/2017     COPD (chronic obstructive pulmonary disease) (H) 11/02/2020     Depression 05/11/2017     Diabetes mellitus (H) 1995     Gouty arthropathy, chronic, without tophi 11/02/2020     H/O gastric bypass 05/11/2017     ICD (implantable cardioverter-defibrillator), biventricular, in situ 05/11/2017     LVAD (left ventricular assist device) present (H)      Major depression, recurrent, chronic (H) 11/02/2020     NICM (nonischemic cardiomyopathy) (H)/ EF 20% 05/11/2017    ECHO: LVEDd. 7.66 cm, Restrictive pattern , Severe mitral valve regurgitation     CECILIA (obstructive sleep apnea) 05/11/2017     Paroxysmal atrial fibrillation (H) 05/11/2017     Paroxysmal VT (H) 05/11/2017     Rotator cuff tear arthropathy of both shoulders 11/02/2020     Uncomplicated asthma      Vitamin B12 deficiency (non anemic) 05/11/2017       PSH:  Past Surgical History:   Procedure Laterality Date     ANESTHESIA CARDIOVERSION N/A 05/11/2020    Procedure: ANESTHESIA, FOR CARDIOVERSION @1100;  Surgeon: GENERIC ANESTHESIA PROVIDER;  Location: [facility] OR     CV HEART BIOPSY N/A 5/13/2021    Procedure: Heart Biopsy;  Surgeon: Scout Robins MD;  [facility]     CV HEART BIOPSY N/A 5/20/2021     Procedure: Heart Biopsy;  Surgeon: Jeffrey Gibson MD;  [facility]     CV HEART BIOPSY N/A 5/27/2021    Procedure: Heart Biopsy;  Surgeon: Jac Dover MD;  [facility]     CV HEART BIOPSY N/A 6/7/2021    Procedure: CV HEART BIOPSY;  Surgeon: Jeffrey Gibson MD;  [facility]     CV HEART BIOPSY N/A 6/21/2021    Procedure: CV HEART BIOPSY;  Surgeon: Scout Robins MD;  [facility]     CV HEART BIOPSY N/A 7/5/2021    Procedure: CV HEART BIOPSY;  Surgeon: Jeffrey Gibson MD;  [facility]     CV HEART BIOPSY N/A 7/16/2021    Procedure: Heart Biopsy;  Surgeon: Amadeo Art MD;  Location:  HEART CARDIAC CATH LAB     CV HEART BIOPSY N/A 8/5/2021    Procedure: Heart Biopsy;  Surgeon: Jeffrey Gibson MD;  Location:  HEART CARDIAC CATH LAB     CV RIGHT HEART CATH MEASUREMENTS RECORDED N/A 07/24/2019    Procedure: CV RIGHT HEART CATH;  Surgeon: Renu Sears MD;  Location:  HEART CARDIAC CATH LAB     CV RIGHT HEART CATH MEASUREMENTS RECORDED N/A 08/05/2020    Procedure: CV RIGHT HEART CATH;  Surgeon: Nicola Seth MD;  Location:  HEART CARDIAC CATH LAB     CV RIGHT HEART CATH MEASUREMENTS RECORDED N/A 01/07/2021    Procedure: CV RIGHT HEART CATH;  Surgeon: Jac Dover MD;  [facility]     CV RIGHT HEART CATH MEASUREMENTS RECORDED N/A 02/23/2021    Procedure: Heart Cath Right Heart Cath;  Surgeon: Jeffrey Gibson MD;  Location:  HEART CARDIAC CATH LAB     CV RIGHT HEART CATH MEASUREMENTS RECORDED N/A 03/23/2021    Procedure: Heart Cath Right Heart Cath. request for 3/23;  Surgeon: Jeffrey Gibson MD;  Location:  HEART CARDIAC CATH LAB     CV RIGHT HEART CATH MEASUREMENTS RECORDED N/A 5/13/2021    Procedure: Right Heart Cath;  Surgeon: Julienne  MD Scout;  Location:  HEART CARDIAC CATH LAB     CV RIGHT HEART CATH MEASUREMENTS RECORDED N/A 5/20/2021    Procedure: Right Heart Cath;  Surgeon: Jeffrey Gibson MD;  Location:  HEART CARDIAC CATH LAB     CV RIGHT HEART CATH MEASUREMENTS RECORDED N/A 5/27/2021    Procedure: Right Heart Cath;  Surgeon: Jac Dover MD;  Location:  HEART CARDIAC CATH LAB     CV RIGHT HEART CATH MEASUREMENTS RECORDED N/A 6/7/2021    Procedure: CV RIGHT HEART CATH;  Surgeon: Jeffrey Gibson MD;  Location:  HEART CARDIAC CATH LAB     CV RIGHT HEART CATH MEASUREMENTS RECORDED N/A 6/21/2021    Procedure: CV RIGHT HEART CATH;  Surgeon: Scout Robins MD;  Location:  HEART CARDIAC CATH LAB     CV RIGHT HEART CATH MEASUREMENTS RECORDED N/A 7/5/2021    Procedure: CV RIGHT HEART CATH;  Surgeon: Jeffrey Gibson MD;  Location:  HEART CARDIAC CATH LAB     CV RIGHT HEART CATH MEASUREMENTS RECORDED N/A 7/16/2021    Procedure: Right Heart Cath;  Surgeon: Amadeo Art MD;  Location:  HEART CARDIAC CATH LAB     CV RIGHT HEART CATH MEASUREMENTS RECORDED N/A 8/5/2021    Procedure: CV RIGHT HEART CATH;  Surgeon: Jeffrey Gibson MD;  Location:  HEART CARDIAC CATH LAB     GI SURGERY  2003    Sylvester en Y     INSERT VENTRICULAR ASSIST DEVICE LEFT (HEARTMATE II) N/A 06/19/2017    Procedure: INSERT VENTRICULAR ASSIST DEVICE LEFT (HEARTMATE II);  Median Sternotomy Heartmate II Left Ventricular Assist Device Insertion on Pump Oxygenator;  Surgeon: Ronnie Quigley MD;  Location: [facility] OR     IR CHEST TUBE PLACEMENT NON-TUNNELED RIGHT  7/11/2021     ORTHOPEDIC SURGERY  1994    right knee wired     PICC DOUBLE LUMEN PLACEMENT Right 09/23/2020    5FR PICC DL. Length-43cm (1cm out).     PICC INSERTION - DOUBLE LUMEN Right 05/09/2021    IK/BRACH     RELEASE CARPAL TUNNEL BILATERAL Bilateral 02/18/2021    Procedure: Bilateral carpal tunnel release;  Surgeon: Jermaine Brand  MD;  Location: [facility] OR     TRANSPLANT HEART RECIPIENT N/A 05/05/2021    Procedure: Redo median sternotomy, lysis of adhesions, heart transplant recipient, on cardiopulmonary bypass, intraoperative transesophageal echocardiogram per anesthesia, Implantable Cardioverter Defibrillator (ICD) removal;  Surgeon: Ronnie Quigley MD;  Location: [facility] OR       MEDICATIONS:  Prior to Admission Medications   Prescriptions Last Dose Informant Patient Reported? Taking?   Continuous Blood Gluc Transmit (DEXCOM G6 TRANSMITTER) MISC Unknown at Unknown time Self Yes No   Fluticasone-Umeclidin-Vilanterol (TRELEGY ELLIPTA) 100-62.5-25 MCG/INH oral inhaler 8/23/2021 at Unknown time Self No Yes   Sig: Inhale 1 puff into the lungs daily   HUMALOG KWIKPEN 100 UNIT/ML soln Past Week at Unknown time Self Yes Yes   Sig: Inject 4 units with Glucerna and 8 units with meals plus 1-9 units per sliding scale with meals and before bed.   Melatonin 10 MG CAPS 8/23/2021 at pm Self Yes Yes   Sig: Take 1 capsule by mouth At Bedtime   VENTOLIN  (90 Base) MCG/ACT inhaler Unknown at prn Self No No   Sig: INHALE 2 PUFFS BY MOUTH EVERY 4 HOURS AS NEEDED. MAX OF 12 PUFFS PER 24 HOURS   acetaminophen (TYLENOL) 325 MG tablet Unknown at prn Self No No   Sig: Take 3 tablets (975 mg) by mouth every 6 hours as needed for other (For optimal non-opioid multimodal pain management to improve pain control.)   allopurinol (ZYLOPRIM) 300 MG tablet 8/23/2021 at am Self No Yes   Sig: Take 1 tablet (300 mg) by mouth daily   amitriptyline (ELAVIL) 25 MG tablet 8/23/2021 at pm Self No Yes   Sig: Take 1 tablet (25 mg) by mouth At Bedtime   anakinra (KINERET) 100 MG/0.67ML SOSY injection More than a month at Unknown time Self No No   Sig: Inject 0.67 mLs (100 mg) Subcutaneous daily For 3 days. Hold for signs of infection, then seek medical attention.   anakinra (KINERET) 100 MG/0.67ML SOSY injection Unknown at Unknown time  No No   Sig: Inject subcutaneously once every 24  hours for 3 days. Contact MD if still with gout symptoms on day 3. Hold for signs of infection, then seek medical attention.   aspirin (ASA) 81 MG chewable tablet 8/23/2021 at am Self No Yes   Sig: Take 1 tablet (81 mg) by mouth daily   blood glucose (NO BRAND SPECIFIED) test strip Unknown at Unknown time Self No No   Sig: Use to test blood sugar four times daily or as directed.   blood glucose (ONE TOUCH DELICA) lancing device Unknown at Unknown time Self No No   Sig: Lancing device to be used with lancets.   blood glucose monitoring (ONE TOUCH ULTRA 2) meter device kit Unknown at Unknown time Self No No   Sig: Use to test blood sugar four times daily or as directed.   buPROPion (WELLBUTRIN) 75 MG tablet 8/23/2021 at pm Self No Yes   Sig: Take 1 tablet (75 mg) by mouth 2 times daily   calcium citrate-vitamin D (CITRACAL) 315-250 MG-UNIT TABS per tablet 8/23/2021 at pm Self No Yes   Sig: Take 1 tablet by mouth 2 times daily   clotrimazole (MYCELEX) 10 MG lozenge 8/23/2021 at pm Self No Yes   Sig: Place 1 lozenge (10 mg) inside cheek 4 times daily   ferrous sulfate (FEROSUL) 325 (65 Fe) MG tablet 8/23/2021 at am Self No Yes   Sig: Take 1 tablet (325 mg) by mouth daily (with breakfast)   gabapentin (NEURONTIN) 300 MG capsule 8/23/2021 at pm Self No Yes   Sig: Take 1 capsule (300 mg) by mouth every morning   Patient taking differently: Take 300 mg by mouth 3 times daily    gabapentin (NEURONTIN) 300 MG capsule 8/23/2021 at Unknown time Self No Yes   Sig: Take 1 capsule (300 mg) by mouth 2 times daily At 12pm and 8pm   insulin glargine (LANTUS PEN) 100 UNIT/ML pen Past Week at pm Self Yes Yes   Sig: Inject 18 Units Subcutaneous At Bedtime   insulin pen needle (32G X 4 MM) 32G X 4 MM miscellaneous Unknown at Unknown time Self No No   Sig: Use four pen needles daily or as directed.   montelukast (SINGULAIR) 10 MG tablet 8/23/2021 at pm Self No Yes   Sig: Take 1 tablet (10 mg) by mouth At Bedtime   mycophenolate (GENERIC  EQUIVALENT) 250 MG capsule 8/23/2021 at pm Self Yes Yes   Sig: Take 2 capsules (500 mg) by mouth 2 times daily   pantoprazole (PROTONIX) 40 MG EC tablet 8/23/2021 at pm Self No Yes   Sig: Take 1 tablet (40 mg) by mouth 2 times daily   predniSONE (DELTASONE) 10 MG tablet 8/23/2021 at Unknown time Self No Yes   Sig: Take 0.5 tablets (5 mg) by mouth every morning   Patient taking differently: Take 5 mg by mouth every morning Start on 40mg 8/23/21   predniSONE (DELTASONE) 10 MG tablet Unknown at Unknown time Self No No   Sig: Take 3 tablets (30 mg) by mouth daily for 4 days, THEN 2 tablets (20 mg) daily for 4 days, THEN 1 tablet (10 mg) daily for 4 days.   predniSONE (DELTASONE) 10 MG tablet 8/23/2021 at am Self No Yes   Sig: Take 2 tablets (20 mg) by mouth daily for 14 days   tacrolimus (GENERIC EQUIVALENT) 1 MG capsule 8/23/2021 at pm Self No Yes   Sig: Take 4 capsules (4 mg) by mouth every morning AND 4 capsules (4 mg) every evening.   traMADol (ULTRAM) 50 MG tablet Unknown at prn Self No No   Sig: Take 0.5-1 tablets (25-50 mg) by mouth every 6 hours as needed for moderate pain      Facility-Administered Medications: None        ALLERGIES:     Allergies   Allergen Reactions     Grass Shortness Of Breath     Ace Inhibitors Cough     Dust Mites Other (See Comments)     Asthma     Mold Other (See Comments)     Asthma     Penicillins Other (See Comments)     Unknown - childhood exposure    Tolerated Zosyn 9/18-9/20/2020     Sulfa Drugs Other (See Comments) and Unknown     Unknown childhood reaction       FAMILY HISTORY:  Family History   Problem Relation Age of Onset     Cerebrovascular Disease Mother 64     Diabetes Mother      Hypertension Mother      Coronary Artery Disease Father      Diabetes Type 2  Father      Obesity Brother      Obesity Brother      Cerebrovascular Disease Daughter 40       SOCIAL HISTORY:  Social History     Socioeconomic History     Marital status:      Spouse name: Not on file      "Number of children: Not on file     Years of education: Not on file     Highest education level: Not on file   Occupational History     Not on file   Tobacco Use     Smoking status: Former Smoker     Quit date:      Years since quittin.6     Smokeless tobacco: Never Used   Substance and Sexual Activity     Alcohol use: No     Drug use: No     Sexual activity: Yes     Partners: Female   Other Topics Concern     Parent/sibling w/ CABG, MI or angioplasty before 65F 55M? No   Social History Narrative     Not on file     Social Determinants of Health     Financial Resource Strain:      Difficulty of Paying Living Expenses:    Food Insecurity:      Worried About Running Out of Food in the Last Year:      Ran Out of Food in the Last Year:    Transportation Needs:      Lack of Transportation (Medical):      Lack of Transportation (Non-Medical):    Physical Activity:      Days of Exercise per Week:      Minutes of Exercise per Session:    Stress:      Feeling of Stress :    Social Connections:      Frequency of Communication with Friends and Family:      Frequency of Social Gatherings with Friends and Family:      Attends [employer] Services:      Active Member of Clubs or Organizations:      Attends Club or Organization Meetings:      Marital Status:    Intimate Partner Violence:      Fear of Current or Ex-Partner:      Emotionally Abused:      Physically Abused:      Sexually Abused:        PHYSICAL EXAM:  Blood pressure 119/86, pulse (!) 130, temperature 100.4  F (38  C), resp. rate 22, height 1.727 m (5' 8\"), weight 85.1 kg (187 lb 11.2 oz), SpO2 95 %.  GENERAL: Appears alert and oriented times three. Lying in bed, visibly fatigued.  HEENT: Eye symmetrical and free of discharge bilaterally. Mucous membranes moist and without lesions. No thrush noted on tongue or cheeks.  NECK: Supple and without lymphadenopathy. JVD negative when sitting upright.   CV: tachycardic but RRR, S1S2 present without murmur, rub, or " gallop.   RESPIRATORY: Respirations shallow, no wheezing, lung sounds clear but diminished on left side  GI: Soft and non distended with normoactive bowel sounds present in all quadrants. No tenderness, rebound, guarding. No organomegaly.   EXTREMITIES: No peripheral edema. 2+ bilateral pedal pulses.   NEUROLOGIC: Alert and orientated x 3. CN II-XII grossly intact. No focal deficits.   MUSCULOSKELETAL: No joint swelling or tenderness.   SKIN: No jaundice. No rashes or lesions.     LABS:  CBC:  Recent Labs   Lab Test 08/24/21  0613   WBC 5.1   RBC 3.21*   HGB 8.8*   HCT 28.9*   MCV 90   MCH 27.4   MCHC 30.4*   RDW 16.6*          CMP:  Recent Labs   Lab Test 08/24/21  0613      POTASSIUM 4.1   CHLORIDE 106   QAMAR 8.0*   CO2 26   BUN 32*   CR 1.91*   *   AST 10   ALT 17   BILITOTAL 0.6   ALBUMIN 2.6*   PROTTOTAL 6.2*   ALKPHOS 122       IMAGING:  TTE 8/5/21  Interpretation Summary  Right ventricular function, chamber size, wall motion, and thickness are  normal.  Left ventricular size, wall motion and function are normal. The ejection  fraction is 60-65%.  No significant valvular abnormalities present.  Trivial loculated pericardial effusion is present.  IVC diameter and respiratory changes fall into an intermediate range  suggesting an RA pressure of 8 mmHg.  There has been no change.    RHC 8/5/21  Hemodynamics RA - --/--/10  RV - 34/10  PA - 35/15/25  PCWP - --/--/14  PA Sat - 64.4%  Hgb - 9.0  Kee CO/CI - 6/2.95  TD CO/CI - 6.27/3.08  PVR - 1.83 KWOK   SVR - 1240 dynes-sec/cm5 (MAP - 103     Chest/Abd/Pelvis CT 7/23/21  IMPRESSION:   1. Decreased right lower lobe cavitary lesion, with nearly resolved  right basilar consolidative opacities. Stable to slightly increased  scattered infectious peribronchovascular nodular and tree-in-bud  opacities.  Continued attention on followup recommended.  2. Grossly similar anterior mediastinal fluid collection as above.  This is favored to represent  postoperative seroma given minimal change  compared to noncontrast chest CT and lack of significant associated  inflammatory fat stranding. This was previously aspirated on  7/15/2021, recommend correlating with these results as well as  patient's clinical status.  3. Decreased small residual right hydropneumothorax.  4. Continued bibasilar subsegmental atelectasis and predominately  right basilar bronchiectasis.  5. Cholelithiasis. Mild pericholecystic fluid is nonspecific and can  be seen in the setting of volume overload. No gallbladder wall  thickening.    CXR 8/24/21  Small cavitary nodule of the medial right lower lobe on prior CTA chest not well seen by radiographic technique. Little change in scarring, pleural thickening, and small pleural effusion of the mid and lower right lung. Left lung largely clear. Poststernotomy and coronary artery bypass grafting. 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 No visible pneumothorax.      ASSESSMENT & PLAN:  Mr. Jim Willingham is a 66yr old male with a history of NICM (s/p HM2 LVAD 6/2017), VT, AFib, CKD, DMII, COPD, now s/p OHT 5/6/21, who presented to the ER with shortness of breath and headache.    NICM, s/p OHT 5/6/21  His post-transplant course has been c/b right pleural air leak (required prolonged chest tube), pAFib, CAP (RLL, 6/2021), recurrent pleural effusions (s/p thoracenteses x2 6/2021 and 7/2021), RLL cavitary lesions (per chest CT 7/14/21, BD glucan indeterminate, aspergillus negative), pericardial effusion (1.4cm per TTE 7/12/21, conservatively managed), low-grade EBV viremia, and recurrent/persistent gout flare.    Rejection history:  none  AlloMap scores:  n/a  DSAs:  none  Coronary angio/Ischemic eval:  Deferred due to renal dysfunction  Last RHC:  8/5/21, mildly elevated biventricular filling pressures with RA 10, mPA 25, PCW 14, and CI 2.95.  Echo/cMRI:  TTE 8/5/21 showed stable graft function, with LVEF 60-65% and normal RV size/function, and  trivial loculated pericardial effusion.    Serostatus:  - CMV D+/R+  - EBV D+/R+  - Toxo D-/R-    Immunosuppression:  - holding PTA prednisone given COVID-19 status (see below) --> PTA dose 5mg daily, prior to prednisone burst for gout (see below)  - Holding MMF (PTA dose 1500mg twice daily)  - continue tacrolimus, goal level 8-10.  Continue tacro 4mg twice daily (PTA dose), and will check level tomorrow.    PPx:  - CAV:  Aspirin 81mg daily.  Statin has been held due to elevated LFTs, defer restarting now.  - GI:  Pantoprazole 40mg daily  - Osteoporosis:  Calcium/vitamin D supplements  - CMV:  Completed 3m Valcyte therapy --> checking CMV levels today  - PCP:  Received pentamidine 8/11, due q28 days  - Thrush:  Clotrimazole troches QID -- started 8/5 to augment tacro levels  - Toxo:  n/a    Graft function:  - BPs:  Controlled  - HRs:  Tachycardic, 130s  - fluid status:  Euvolemic, not on diuretics.  Adding NTproBNP.      COVID-19 infection  He is not currently showing any signs of bacterial pneumonia, but per ID, he is at risk for developing superimposed bacterial and fungal infections given his recent high-dose steroids (for gout) and RLL pneumonia.  Per Transplant ID --  I think this patient would greatly benefit from the monoclonal antibodies therapy if we can stop the high dose steroids and manage the gout with non-steroidal antiinflammatory agents including anakinra.  D-Dimer 3.27.  - starting lovenox 0.5mg/kg subcutaneous q12 hours  - COVID-19 monoclonal antibodies x1 --- ok'd and recommended per ID, discussed with pharmacy  - remdesivir 200mg x1 today, followed by 100mg daily starting tomorrow, for a total of 5 days  - hydrocortisone 100mg IV x 1 given (for adrenal insufficiency).  Will avoid additional steroids as able.  - Per ID, no indication for additional antibiotics currently but will continue to closely monitor clinical status  - repeat Chest CT to follow-up RLL infiltrate vs cavity  - NTproBNP  pending  - CMV and EBV levels pending    Trivial pericardial effusion  Per TTE 7/12/21, will repeat TTE.    RLL pneumonia  Recurrent exudative pleural effusions (6/2021, 7/2021)  RLL cavitary lesion vs infiltrate  The effusion was negative for fungal/bacterial/AFB cx.  Treated with IV ABx then levaquin.  Urine and serum Blasto and Histo Ag were negative, A galactomannan was negative, and BD glucan was intermediate.  No antifungal therapy was given with repeat CT 7/23/2021 showing improvement.  - repeating chest CT to follow-up RLL infiltrate vs cavity    EBV viremia  EBV has been lowly-detected.  - EBV level today    Gout/pseudogout  Predated transplant.  Has had recurrent flares following transplant, and continues to follow with rheumatology.  He notes that his current flare started in his fingers, and has now progressed up to his elbow.  The pain limits his activity.  He has been treated with Anakinra, with positive results, but has not been approved for outpatient use by his insurance.  He recently completed a steroid burst per rheum (30mg x 4 days, 20mg x 4 days, 10mg x 4 days, then back to 5mg per transplant protocol), with little effect, so was advised to start a prednisone 40mg x 5 days today -- which he has not yet started.  He was given a one-time dose of hydrocortisone for adrenal insufficiency this am.  Spoke with rheumatology --> will plan to monitor his symptoms following the hydrocortisone, and will consult rheum tomorrow if his symptoms persist as he could be considered for ongoing Anakinra therapy.  - consult rheum tomorrow to consider Anakinra pending gout symptoms tomorrow  - defer PTA allopurinol therapy given active flare    Iron deficiency anemia  Iron sat 8/5 was 13%.  He was started on po iron, but note that absorption is impaired while on po PPI.  Will plan to administer IV iron once he is clinically stable.    WALTER on CKD  Creatinine had been stable at 1.4-1.6, but has been elevated to 1.9-2s  in the recent weeks.  Likely ddx include tacro therapy and mild hypovolemia.  He received IVF in the ED.  He is not on diuretics.  Will closely monitor tacro levels.  - continue to trend BMP daily    Transaminitis, resolved  Auestionable Choledocholithiasis  Abdominal ultrasound 7/14/21 showing hepatomegaly with steatosis and no biliary dilatation.  LFTs stable on arrival today.  - trend LFTs    Severe protein calorie malnutrition  History of Sylvester En Y gastric bypass  Albumin on arrival 2.6.  - nutrition consult, appreciate rec's     DMII:  Starting lantus 15units at bedtime (PTA 18units), med sliding scale insulin, hypoglycemia protocol.  May need to consider endo consult pending BG trend.  Depression:  PTA wellbutrin 75mg BID  COPD:  PTA singulair, Trelegy Ellipta, Albuterol.         FEN: regular diet  PROPHY:  ambulate  LINES:  PIV  DISPO:  pending  CODE STATUS:  Full code        The above was reviewed with Dr. Mccarty.      Shelia Means, DNP, FNP-BC, CHFN  Advanced Heart Failure Nurse Practitioner  [facility]

## 2021-08-24 NOTE — Clinical Note
Potential access sites were evaluated for patency using ultrasound.   The Right Brachiocephalic Vein was selected. Access was obtained under with Sonosite and Fluoroscopic guidance using a micropuncture 21 guage needle with direct visualization of needle entry.

## 2021-08-24 NOTE — PROGRESS NOTES
CLINICAL NUTRITION SERVICES  Acknowledging provider consult: 2 g Na ed     Pt currently boarding in ED. Will address when pt transfers to inpatient unit.    Karla Arias RD, LD  6B pager: [phone]

## 2021-08-24 NOTE — ED PROVIDER NOTES
History     Chief Complaint   Patient presents with     Shortness of Breath     HPI  Jim Willingham is a 66 year old male with PMH notable for COPD, cardiomyopathy status post heart transplant in May 2021 who presents to the ED with shortness of breath.  Patient reports he awoke at 1 AM feeling very short of breath.  No chest pain.  He has had congestion, sore throat, cough, and subjective fevers for the past couple days.  Temperatures have been in the 99s F.  He has not been vaccinated for COVID-19. No abdominal pain, no vomiting, no diarrhea.  Patient has had a frontal headache for the past couple days as well, no neck pain or stiffness.     Patient received a DuoNeb with EMS on route to the ED.  He reports that it helped quite a bit.    Past Medical History  Past Medical History:   Diagnosis Date     Bariatric surgery status 2003     Benign essential hypertension 05/11/2017     Bilateral carpal tunnel syndrome 11/02/2020     Cardiomyopathy, unspecified (H) 05/08/2017     CKD (chronic kidney disease) stage 3, GFR 30-59 ml/min 05/11/2017     COPD (chronic obstructive pulmonary disease) (H) 11/02/2020     Depression 05/11/2017     Diabetes mellitus (H) 1995     Gouty arthropathy, chronic, without tophi 11/02/2020     H/O gastric bypass 05/11/2017     ICD (implantable cardioverter-defibrillator), biventricular, in situ 05/11/2017     LVAD (left ventricular assist device) present (H)      Major depression, recurrent, chronic (H) 11/02/2020     NICM (nonischemic cardiomyopathy) (H)/ EF 20% 05/11/2017    ECHO: LVEDd. 7.66 cm, Restrictive pattern , Severe mitral valve regurgitation     CECILIA (obstructive sleep apnea) 05/11/2017     Paroxysmal atrial fibrillation (H) 05/11/2017     Paroxysmal VT (H) 05/11/2017     Rotator cuff tear arthropathy of both shoulders 11/02/2020     Uncomplicated asthma      Vitamin B12 deficiency (non anemic) 05/11/2017     Past Surgical History:   Procedure Laterality Date     ANESTHESIA  CARDIOVERSION N/A 05/11/2020    Procedure: ANESTHESIA, FOR CARDIOVERSION @1100;  Surgeon: GENERIC ANESTHESIA PROVIDER;  [facility]: [facility]     CV HEART BIOPSY N/A 5/13/2021    Procedure: Heart Biopsy;  Surgeon: Scout Robins MD;  [facility]AC [facility]     CV HEART BIOPSY N/A 5/20/2021    Procedure: Heart Biopsy;  Surgeon: Jeffrey Gibson MD;  [facility]     CV HEART BIOPSY N/A 5/27/2021    Procedure: Heart Biopsy;  Surgeon: Jac Dover MD;  [facility]     CV HEART BIOPSY N/A 6/7/2021    Procedure: CV HEART BIOPSY;  Surgeon: Jeffrey Gibson MD;  [facility]     CV HEART BIOPSY N/A 6/21/2021    Procedure: CV HEART BIOPSY;  Surgeon: Scout Robins MD;  [facility]     CV HEART BIOPSY N/A 7/5/2021    Procedure: CV HEART BIOPSY;  Surgeon: Jeffrey Gibson MD;  [facility]     CV HEART BIOPSY N/A 7/16/2021    Procedure: Heart Biopsy;  Surgeon: Amadeo Art MD;  [facility]     CV HEART BIOPSY N/A 8/5/2021    Procedure: Heart Biopsy;  Surgeon: Jeffrey Gibson MD;  [facility]     CV RIGHT HEART CATH MEASUREMENTS RECORDED N/A 07/24/2019    Procedure: CV RIGHT HEART CATH;  Surgeon: Renu Sears MD;  [facility]     CV RIGHT HEART CATH MEASUREMENTS RECORDED N/A 08/05/2020    Procedure: CV RIGHT HEART CATH;  Surgeon: Nicola Seth MD;  [facility]     CV RIGHT HEART CATH MEASUREMENTS RECORDED N/A 01/07/2021    Procedure: CV RIGHT HEART CATH;  Surgeon: Jac Dover MD;  [facility]     CV RIGHT HEART CATH MEASUREMENTS RECORDED N/A 02/23/2021    Procedure: Heart Cath Right Heart Cath;  Surgeon: Jeffrey Gibson MD;  Location:  HEART CARDIAC CATH LAB     CV  RIGHT HEART CATH MEASUREMENTS RECORDED N/A 03/23/2021    Procedure: Heart Cath Right Heart Cath. request for 3/23;  Surgeon: Jeffrey Gibson MD;  Location:  HEART CARDIAC CATH LAB     CV RIGHT HEART CATH MEASUREMENTS RECORDED N/A 5/13/2021    Procedure: Right Heart Cath;  Surgeon: Scout Robins MD;  Location:  HEART CARDIAC CATH LAB     CV RIGHT HEART CATH MEASUREMENTS RECORDED N/A 5/20/2021    Procedure: Right Heart Cath;  Surgeon: Jeffrey Gibson MD;  Location:  HEART CARDIAC CATH LAB     CV RIGHT HEART CATH MEASUREMENTS RECORDED N/A 5/27/2021    Procedure: Right Heart Cath;  Surgeon: Jac Dover MD;  Location:  HEART CARDIAC CATH LAB     CV RIGHT HEART CATH MEASUREMENTS RECORDED N/A 6/7/2021    Procedure: CV RIGHT HEART CATH;  Surgeon: Jeffrey Gibson MD;  Location:  HEART CARDIAC CATH LAB     CV RIGHT HEART CATH MEASUREMENTS RECORDED N/A 6/21/2021    Procedure: CV RIGHT HEART CATH;  Surgeon: Scout Robins MD;  Location:  HEART CARDIAC CATH LAB     CV RIGHT HEART CATH MEASUREMENTS RECORDED N/A 7/5/2021    Procedure: CV RIGHT HEART CATH;  Surgeon: Jeffrey Gibson MD;  Location:  HEART CARDIAC CATH LAB     CV RIGHT HEART CATH MEASUREMENTS RECORDED N/A 7/16/2021    Procedure: Right Heart Cath;  Surgeon: Amadeo Art MD;  Location:  HEART CARDIAC CATH LAB     CV RIGHT HEART CATH MEASUREMENTS RECORDED N/A 8/5/2021    Procedure: CV RIGHT HEART CATH;  Surgeon: Jeffrey Gibson MD;  Location:  HEART CARDIAC CATH LAB     GI SURGERY  2003    [facility]     INSERT VENTRICULAR ASSIST DEVICE LEFT (HEARTMATE II) N/A 06/19/2017    Procedure: INSERT VENTRICULAR ASSIST DEVICE LEFT (HEARTMATE II);  Median Sternotomy Heartmate II Left Ventricular Assist Device Insertion on Pump Oxygenator;  Surgeon: Ronnie Quigley MD;  Location: [facility]     IR CHEST TUBE PLACEMENT NON-TUNNELED RIGHT  7/11/2021     ORTHOPEDIC  SURGERY  1994    right knee wired     PICC DOUBLE LUMEN PLACEMENT Right 09/23/2020    5FR PICC DL. Length-43cm (1cm out).     PICC INSERTION - DOUBLE LUMEN Right 05/09/2021    IK/BRACH     RELEASE CARPAL TUNNEL BILATERAL Bilateral 02/18/2021    Procedure: Bilateral carpal tunnel release;  Surgeon: Jermaine Brand MD;  Location: [facility] OR     TRANSPLANT HEART RECIPIENT N/A 05/05/2021    Procedure: Redo median sternotomy, lysis of adhesions, heart transplant recipient, on cardiopulmonary bypass, intraoperative transesophageal echocardiogram per anesthesia, Implantable Cardioverter Defibrillator (ICD) removal;  Surgeon: Ronnie Quigley MD;  Location: [facility] OR     ACE/ARB/ARNI NOT PRESCRIBED (INTENTIONAL)  acetaminophen (TYLENOL) 325 MG tablet  allopurinol (ZYLOPRIM) 300 MG tablet  amitriptyline (ELAVIL) 25 MG tablet  anakinra (KINERET) 100 MG/0.67ML SOSY injection  artificial tears OINT ophthalmic ointment  aspirin (ASA) 81 MG chewable tablet  blood glucose (NO BRAND SPECIFIED) test strip  blood glucose (ONE TOUCH DELICA) lancing device  blood glucose monitoring (ONE TOUCH ULTRA 2) meter device kit  buPROPion (WELLBUTRIN) 75 MG tablet  calcium citrate-vitamin D (CITRACAL) 315-250 MG-UNIT TABS per tablet  clotrimazole (MYCELEX) 10 MG lozenge  Continuous Blood Gluc Transmit (DEXCOM G6 TRANSMITTER) MISC  ferrous sulfate (FEROSUL) 325 (65 Fe) MG tablet  Fluticasone-Umeclidin-Vilanterol (TRELEGY ELLIPTA) 100-62.5-25 MCG/INH oral inhaler  gabapentin (NEURONTIN) 300 MG capsule  gabapentin (NEURONTIN) 300 MG capsule  HUMALOG KWIKPEN 100 UNIT/ML soln  insulin glargine (LANTUS PEN) 100 UNIT/ML pen  insulin pen needle (32G X 4 MM) 32G X 4 MM miscellaneous  Melatonin 10 MG CAPS  montelukast (SINGULAIR) 10 MG tablet  mycophenolate (GENERIC EQUIVALENT) 250 MG capsule  pantoprazole (PROTONIX) 40 MG EC tablet  polyethylene glycol (MIRALAX) 17 GM/Dose powder  predniSONE (DELTASONE) 10 MG tablet  predniSONE (DELTASONE) 10 MG tablet  predniSONE  "(DELTASONE) 10 MG tablet  senna-docusate (SENOKOT-S/PERICOLACE) 8.6-50 MG tablet  tacrolimus (GENERIC EQUIVALENT) 1 MG capsule  traMADol (ULTRAM) 50 MG tablet  VENTOLIN  (90 Base) MCG/ACT inhaler      Allergies   Allergen Reactions     Grass Shortness Of Breath     Ace Inhibitors Cough     Dust Mites Other (See Comments)     Asthma     Mold Other (See Comments)     Asthma     Penicillins Other (See Comments)     Unknown - childhood exposure    Tolerated Zosyn -2020     Sulfa Drugs Other (See Comments) and Unknown     Unknown childhood reaction     Social History   Social History     Tobacco Use     Smoking status: Former Smoker     Quit date:      Years since quittin.6     Smokeless tobacco: Never Used   Substance Use Topics     Alcohol use: No     Drug use: No      Past medical history and social history were reviewed with the patient. Additional pertinent items: None     Review of Systems  A complete review of systems was performed with pertinent positives and negatives noted in the HPI, and all other systems negative.    Physical Exam   BP: 126/86  Pulse: (!) 136  Temp: (!) 101.4  F (38.6  C)  Resp: 20  Height: 172.7 cm (5' 8\")  Weight: 85.1 kg (187 lb 11.2 oz)  SpO2: 95 %    Physical Exam  General: tired appearing. Appears stated age.   HENT: MMM, no oropharyngeal lesions  Eyes: PERRL, normal sclerae  Neck: non-tender, supple  Cardio: tachycardic rate. Regular rhythm. Extremities well perfused  Resp: mildly increased work of breathing, mildly increased respiratory rate. Mildly coarse breath sounds diffusely.   Abdomen: no tenderness, non-distended, no rebound, no guarding  Neuro: alert and fully oriented, is somewhat slow to respond and mildly confused. CN II-XII grossly intact. Grossly normal strength and sensation in all extremities.   MSK: no deformities. Grossly normal ROM in extremities.   Integumentary/Skin: no rash visualized, normal color  Psych: normal affect, normal " behavior    ED Course      Procedures            EKG Interpretation:      Interpreted by Sujit Bell MD  Time reviewed: 0657  Symptoms at time of EKG: dyspnea, tachycardia, history of heart transplant   Rhythm: sinus tachycardia  Rate: 130-140  Axis: Normal  Ectopy: none  Conduction: normal  ST Segments/ T Waves: Non-specific ST-T wave changes  Q Waves: none  Comparison to prior: Increased rate, morphology otherwise unchanged from 7/12/2021    Clinical Impression: sinus tachycardia with morphology similar to previous and no clear evidence of acute ischemia      Critical Care Addendum  My initial assessment, based on my review of prehospital provider report, focused history and physical exam, established that Jim Willingham has suspicion for sepsis and need for evaluation and early goal-directed therapy, which requires immediate intervention, and therefore he is critically ill.     After the initial assessment, the care team initiated multiple lab tests, initiated IV fluid administration, initiated medication therapy with cefepime and azithromycin and consulted with cardiology to provide stabilization care. Due to the critical nature of this patient, I reassessed nursing observations, vital signs, physical exam, review of cardiac rhythm monitor, 12 lead ECG analysis, interpretation of imaging studies and respiratory status multiple times prior to his disposition.     Time also spent performing documentation, reviewing test results, discussion with consultants and coordination of care.     Critical care time (excluding teaching time and procedures): 40 minutes.        Labs Ordered and Resulted from Time of ED Arrival Up to the Time of Departure from the ED   COMPREHENSIVE METABOLIC PANEL - Abnormal; Notable for the following components:       Result Value    Urea Nitrogen 32 (*)     Creatinine 1.91 (*)     Calcium 8.0 (*)     Glucose 192 (*)     Protein Total 6.2 (*)     Albumin 2.6 (*)     GFR Estimate 36 (*)      All other components within normal limits   ISTAT GASES LACTATE VENOUS POCT - Abnormal; Notable for the following components:    O2 Sat, Venous POCT 60 (*)     All other components within normal limits   CBC WITH PLATELETS AND DIFFERENTIAL - Abnormal; Notable for the following components:    RBC Count 3.21 (*)     Hemoglobin 8.8 (*)     Hematocrit 28.9 (*)     MCHC 30.4 (*)     RDW 16.6 (*)     All other components within normal limits   DIFFERENTIAL - Abnormal; Notable for the following components:    Absolute Lymphocytes 0.4 (*)     Elliptocytes Slight (*)     All other components within normal limits   ROUTINE UA WITH MICROSCOPIC REFLEX TO CULTURE - Abnormal; Notable for the following components:    Protein Albumin Urine 50  (*)     Mucus Urine Present (*)     All other components within normal limits    Narrative:     Urine Culture not indicated   PROCALCITONIN - Normal   TROPONIN I - Normal   CBC WITH PLATELETS & DIFFERENTIAL    Narrative:     The following orders were created for panel order CBC with platelets differential.  Procedure                               Abnormality         Status                     ---------                               -----------         ------                     CBC with platelets and d...[[id number]]  Abnormal            Final result               Manual Differential[[id number]]          Abnormal            Final result                 Please view results for these tests on the individual orders.   BLOOD GAS ARTERIAL   COVID-19 VIRUS (CORONAVIRUS) BY PCR   EXTRA BLUE TOP TUBE   EXTRA RED TOP TUBE   ISTAT CG4 GASES LACTATE KP NURSING POCT   PULSE OXIMETRY NURSING   CARDIAC CONTINUOUS MONITORING   STRICT INTAKE AND OUTPUT   PERIPHERAL IV CATHETER   BLOOD CULTURE   BLOOD CULTURE   EXTRA TUBE    Narrative:     The following orders were created for panel order Extra Tube.  Procedure                               Abnormality         Status                     ---------                                -----------         ------                     Extra Blue Top Tube[[id number]]                                                         Extra Red Top Tube[[id number]]                                                            Please view results for these tests on the individual orders.     XR Chest Port 1 View   Final Result   IMPRESSION: Small cavitary nodule of the medial right lower lobe on prior CTA chest not well seen by radiographic technique. Little change in scarring, pleural thickening, and small pleural effusion of the mid and lower right lung. Left lung largely    clear. Poststernotomy and coronary artery bypass grafting. 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 No visible pneumothorax.             Assessments & Plan (with Medical Decision Making)   Patient presenting with fever, SOB, tachycardia. Vitals in the ED notable for HR in 130s, temp 101.4 F, SpO2 in upper 90s on 2 L by NC. Nursing notes reviewed. Initial differential diagnosis includes but not limited to pneumonia, COVID-19, sepsis, COPD exacerbation, ACS.     Shortness of breath had improved significantly after DuoNeb with EMS.    With fever and tachycardia, there is high initial suspicion for sepsis.  Early goal-directed therapy undertaken.  Cultures drawn, cefepime and azithromycin given for broad-spectrum coverage with suspicion of respiratory source.  Chest x-ray showed multiple areas of consolidation that were fairly similar to previous.  Lactate within normal limits, WBC within normal limits.  Procalcitonin 0.16, consistent with low risk range for bacterial infection.  COVID-19 swab sent and pending.    The complete clinical picture is most consistent with shortness of breath, fever, tachycardia with suspicion of COVID-19 or other viral URI in the context of immunosuppression and heart transplant.  Case discussed with Dr. Mccarty of cardiology.  After counseling on the diagnosis, work-up, and treatment plan, the  patient was admitted to [facility].      Final diagnoses:   Fever in adult   Shortness of breath   Sinus tachycardia   Status post heart transplantation (H)     New Prescriptions    No medications on file       --  Sujit Bell MD   Emergency Medicine   [facility] EMERGENCY DEPARTMENT  8/24/2021     Sujit Bell MD  08/24/21 0808

## 2021-08-25 ENCOUNTER — TELEPHONE (OUTPATIENT)
Dept: RHEUMATOLOGY | Facility: CLINIC | Age: 66
End: 2021-08-25

## 2021-08-25 LAB
ACANTHOCYTES BLD QL SMEAR: SLIGHT
ANION GAP SERPL CALCULATED.3IONS-SCNC: 5 MMOL/L (ref 3–14)
BASOPHILS # BLD MANUAL: 0 10E3/UL (ref 0–0.2)
BASOPHILS NFR BLD MANUAL: 0 %
BUN SERPL-MCNC: 32 MG/DL (ref 7–30)
CALCIUM SERPL-MCNC: 7.5 MG/DL (ref 8.5–10.1)
CHLORIDE BLD-SCNC: 106 MMOL/L (ref 94–109)
CO2 SERPL-SCNC: 24 MMOL/L (ref 20–32)
CREAT SERPL-MCNC: 1.83 MG/DL (ref 0.66–1.25)
CRYPTOC AG SPEC QL: NEGATIVE
EBV DNA # SPEC NAA+PROBE: <500 COPIES/ML
EBV DNA SPEC NAA+PROBE-LOG#: <2.7 {LOG_COPIES}/ML
ELLIPTOCYTES BLD QL SMEAR: SLIGHT
EOSINOPHIL # BLD MANUAL: 0 10E3/UL (ref 0–0.7)
EOSINOPHIL NFR BLD MANUAL: 0 %
ERYTHROCYTE [DISTWIDTH] IN BLOOD BY AUTOMATED COUNT: 16.8 % (ref 10–15)
FRAGMENTS BLD QL SMEAR: SLIGHT
GFR SERPL CREATININE-BSD FRML MDRD: 38 ML/MIN/1.73M2
GLUCOSE BLD-MCNC: 215 MG/DL (ref 70–99)
GLUCOSE BLDC GLUCOMTR-MCNC: 108 MG/DL (ref 70–99)
GLUCOSE BLDC GLUCOMTR-MCNC: 185 MG/DL (ref 70–99)
GLUCOSE BLDC GLUCOMTR-MCNC: 254 MG/DL (ref 70–99)
GLUCOSE BLDC GLUCOMTR-MCNC: 321 MG/DL (ref 70–99)
HCT VFR BLD AUTO: 26.5 % (ref 40–53)
HGB BLD-MCNC: 7.9 G/DL (ref 13.3–17.7)
HOLD SPECIMEN: NORMAL
LYMPHOCYTES # BLD MANUAL: 0.6 10E3/UL (ref 0.8–5.3)
LYMPHOCYTES NFR BLD MANUAL: 9 %
MAGNESIUM SERPL-MCNC: 1.5 MG/DL (ref 1.6–2.3)
MAGNESIUM SERPL-MCNC: 1.6 MG/DL (ref 1.6–2.3)
MCH RBC QN AUTO: 27.1 PG (ref 26.5–33)
MCHC RBC AUTO-ENTMCNC: 29.8 G/DL (ref 31.5–36.5)
MCV RBC AUTO: 91 FL (ref 78–100)
MONOCYTES # BLD MANUAL: 0.6 10E3/UL (ref 0–1.3)
MONOCYTES NFR BLD MANUAL: 9 %
MRSA DNA SPEC QL NAA+PROBE: NEGATIVE
NEUTROPHILS # BLD MANUAL: 5.7 10E3/UL (ref 1.6–8.3)
NEUTROPHILS NFR BLD MANUAL: 82 %
PLAT MORPH BLD: ABNORMAL
PLATELET # BLD AUTO: 252 10E3/UL (ref 150–450)
POTASSIUM BLD-SCNC: 4 MMOL/L (ref 3.4–5.3)
RBC # BLD AUTO: 2.91 10E6/UL (ref 4.4–5.9)
RBC MORPH BLD: ABNORMAL
SA TARGET DNA: NEGATIVE
SARS-COV-2 RNA RESP QL NAA+PROBE: POSITIVE
SODIUM SERPL-SCNC: 135 MMOL/L (ref 133–144)
TACROLIMUS BLD-MCNC: 9 UG/L (ref 5–15)
TME LAST DOSE: NORMAL H
TME LAST DOSE: NORMAL H
WBC # BLD AUTO: 7 10E3/UL (ref 4–11)

## 2021-08-25 PROCEDURE — 999N000128 HC STATISTIC PERIPHERAL IV START W/O US GUIDANCE

## 2021-08-25 PROCEDURE — 250N000012 HC RX MED GY IP 250 OP 636 PS 637: Performed by: STUDENT IN AN ORGANIZED HEALTH CARE EDUCATION/TRAINING PROGRAM

## 2021-08-25 PROCEDURE — 99233 SBSQ HOSP IP/OBS HIGH 50: CPT | Mod: GC | Performed by: INTERNAL MEDICINE

## 2021-08-25 PROCEDURE — 250N000013 HC RX MED GY IP 250 OP 250 PS 637: Performed by: NURSE PRACTITIONER

## 2021-08-25 PROCEDURE — 99233 SBSQ HOSP IP/OBS HIGH 50: CPT | Performed by: INTERNAL MEDICINE

## 2021-08-25 PROCEDURE — 120N000005 HC R&B MS OVERFLOW UMMC

## 2021-08-25 PROCEDURE — 80197 ASSAY OF TACROLIMUS: CPT | Performed by: NURSE PRACTITIONER

## 2021-08-25 PROCEDURE — U0003 INFECTIOUS AGENT DETECTION BY NUCLEIC ACID (DNA OR RNA); SEVERE ACUTE RESPIRATORY SYNDROME CORONAVIRUS 2 (SARS-COV-2) (CORONAVIRUS DISEASE [COVID-19]), AMPLIFIED PROBE TECHNIQUE, MAKING USE OF HIGH THROUGHPUT TECHNOLOGIES AS DESCRIBED BY CMS-2020-01-R: HCPCS | Performed by: INTERNAL MEDICINE

## 2021-08-25 PROCEDURE — 36415 COLL VENOUS BLD VENIPUNCTURE: CPT | Performed by: INTERNAL MEDICINE

## 2021-08-25 PROCEDURE — 250N000011 HC RX IP 250 OP 636: Performed by: NURSE PRACTITIONER

## 2021-08-25 PROCEDURE — 258N000003 HC RX IP 258 OP 636: Performed by: STUDENT IN AN ORGANIZED HEALTH CARE EDUCATION/TRAINING PROGRAM

## 2021-08-25 PROCEDURE — 87305 ASPERGILLUS AG IA: CPT | Performed by: INTERNAL MEDICINE

## 2021-08-25 PROCEDURE — 250N000009 HC RX 250: Performed by: NURSE PRACTITIONER

## 2021-08-25 PROCEDURE — 87449 NOS EACH ORGANISM AG IA: CPT | Performed by: INTERNAL MEDICINE

## 2021-08-25 PROCEDURE — 250N000011 HC RX IP 250 OP 636: Performed by: INTERNAL MEDICINE

## 2021-08-25 PROCEDURE — 258N000003 HC RX IP 258 OP 636: Performed by: INTERNAL MEDICINE

## 2021-08-25 PROCEDURE — 87899 AGENT NOS ASSAY W/OPTIC: CPT | Performed by: INTERNAL MEDICINE

## 2021-08-25 PROCEDURE — 250N000011 HC RX IP 250 OP 636: Performed by: STUDENT IN AN ORGANIZED HEALTH CARE EDUCATION/TRAINING PROGRAM

## 2021-08-25 PROCEDURE — 250N000012 HC RX MED GY IP 250 OP 636 PS 637: Performed by: NURSE PRACTITIONER

## 2021-08-25 PROCEDURE — 36415 COLL VENOUS BLD VENIPUNCTURE: CPT | Performed by: NURSE PRACTITIONER

## 2021-08-25 PROCEDURE — 258N000003 HC RX IP 258 OP 636: Performed by: NURSE PRACTITIONER

## 2021-08-25 PROCEDURE — 94660 CPAP INITIATION&MGMT: CPT

## 2021-08-25 PROCEDURE — 80048 BASIC METABOLIC PNL TOTAL CA: CPT | Performed by: NURSE PRACTITIONER

## 2021-08-25 PROCEDURE — 83735 ASSAY OF MAGNESIUM: CPT | Performed by: NURSE PRACTITIONER

## 2021-08-25 PROCEDURE — 87641 MR-STAPH DNA AMP PROBE: CPT | Performed by: INTERNAL MEDICINE

## 2021-08-25 PROCEDURE — 999N000157 HC STATISTIC RCP TIME EA 10 MIN

## 2021-08-25 PROCEDURE — 250N000013 HC RX MED GY IP 250 OP 250 PS 637: Performed by: INTERNAL MEDICINE

## 2021-08-25 PROCEDURE — 87640 STAPH A DNA AMP PROBE: CPT | Performed by: INTERNAL MEDICINE

## 2021-08-25 PROCEDURE — 85027 COMPLETE CBC AUTOMATED: CPT | Performed by: NURSE PRACTITIONER

## 2021-08-25 PROCEDURE — 250N000013 HC RX MED GY IP 250 OP 250 PS 637: Performed by: STUDENT IN AN ORGANIZED HEALTH CARE EDUCATION/TRAINING PROGRAM

## 2021-08-25 RX ORDER — PREDNISONE 5 MG/1
5 TABLET ORAL DAILY
Status: DISCONTINUED | OUTPATIENT
Start: 2021-08-25 | End: 2021-09-02 | Stop reason: HOSPADM

## 2021-08-25 RX ORDER — PIPERACILLIN SODIUM, TAZOBACTAM SODIUM 4; .5 G/20ML; G/20ML
4.5 INJECTION, POWDER, LYOPHILIZED, FOR SOLUTION INTRAVENOUS EVERY 6 HOURS
Status: DISCONTINUED | OUTPATIENT
Start: 2021-08-25 | End: 2021-08-26

## 2021-08-25 RX ADMIN — ASPIRIN 81 MG: 81 TABLET, COATED ORAL at 09:37

## 2021-08-25 RX ADMIN — GABAPENTIN 300 MG: 300 CAPSULE ORAL at 09:38

## 2021-08-25 RX ADMIN — CLOTRIMAZOLE 1 LOZENGE: 10 LOZENGE ORAL at 16:42

## 2021-08-25 RX ADMIN — PREDNISONE 5 MG: 5 TABLET ORAL at 09:39

## 2021-08-25 RX ADMIN — CALCIUM CARBONATE 600 MG (1,500 MG)-VITAMIN D3 400 UNIT TABLET 1 TABLET: at 09:38

## 2021-08-25 RX ADMIN — CALCIUM CARBONATE 600 MG (1,500 MG)-VITAMIN D3 400 UNIT TABLET 1 TABLET: at 18:09

## 2021-08-25 RX ADMIN — CLOTRIMAZOLE 1 LOZENGE: 10 LOZENGE ORAL at 20:19

## 2021-08-25 RX ADMIN — INSULIN ASPART 1 UNITS: 100 INJECTION, SOLUTION INTRAVENOUS; SUBCUTANEOUS at 18:09

## 2021-08-25 RX ADMIN — FLUTICASONE FUROATE AND VILANTEROL TRIFENATATE 1 PUFF: 100; 25 POWDER RESPIRATORY (INHALATION) at 09:38

## 2021-08-25 RX ADMIN — CLOTRIMAZOLE 1 LOZENGE: 10 LOZENGE ORAL at 09:38

## 2021-08-25 RX ADMIN — ENOXAPARIN SODIUM 40 MG: 40 INJECTION SUBCUTANEOUS at 04:54

## 2021-08-25 RX ADMIN — TACROLIMUS 4 MG: 1 CAPSULE ORAL at 09:39

## 2021-08-25 RX ADMIN — BUPROPION HYDROCHLORIDE 75 MG: 75 TABLET, FILM COATED ORAL at 09:38

## 2021-08-25 RX ADMIN — CLOTRIMAZOLE 1 LOZENGE: 10 LOZENGE ORAL at 12:32

## 2021-08-25 RX ADMIN — PANTOPRAZOLE SODIUM 40 MG: 40 TABLET, DELAYED RELEASE ORAL at 09:39

## 2021-08-25 RX ADMIN — VANCOMYCIN HYDROCHLORIDE 1750 MG: 10 INJECTION, POWDER, LYOPHILIZED, FOR SOLUTION INTRAVENOUS at 22:51

## 2021-08-25 RX ADMIN — ENOXAPARIN SODIUM 40 MG: 40 INJECTION SUBCUTANEOUS at 16:42

## 2021-08-25 RX ADMIN — AMITRIPTYLINE HYDROCHLORIDE 25 MG: 25 TABLET, FILM COATED ORAL at 21:27

## 2021-08-25 RX ADMIN — REMDESIVIR 100 MG: 100 INJECTION, POWDER, LYOPHILIZED, FOR SOLUTION INTRAVENOUS at 18:04

## 2021-08-25 RX ADMIN — SODIUM CHLORIDE 50 ML: 9 INJECTION, SOLUTION INTRAVENOUS at 18:53

## 2021-08-25 RX ADMIN — GABAPENTIN 300 MG: 300 CAPSULE ORAL at 20:20

## 2021-08-25 RX ADMIN — GABAPENTIN 300 MG: 300 CAPSULE ORAL at 15:07

## 2021-08-25 RX ADMIN — BUPROPION HYDROCHLORIDE 75 MG: 75 TABLET, FILM COATED ORAL at 20:20

## 2021-08-25 RX ADMIN — MONTELUKAST 10 MG: 10 TABLET, FILM COATED ORAL at 21:27

## 2021-08-25 RX ADMIN — TACROLIMUS 4 MG: 1 CAPSULE ORAL at 18:08

## 2021-08-25 RX ADMIN — MICAFUNGIN SODIUM 150 MG: 50 INJECTION, POWDER, LYOPHILIZED, FOR SOLUTION INTRAVENOUS at 21:27

## 2021-08-25 RX ADMIN — UMECLIDINIUM 1 PUFF: 62.5 AEROSOL, POWDER ORAL at 09:39

## 2021-08-25 RX ADMIN — INSULIN GLARGINE 15 UNITS: 100 INJECTION, SOLUTION SUBCUTANEOUS at 22:51

## 2021-08-25 RX ADMIN — PIPERACILLIN SODIUM AND TAZOBACTAM SODIUM 4.5 G: 4; .5 INJECTION, POWDER, LYOPHILIZED, FOR SOLUTION INTRAVENOUS at 20:19

## 2021-08-25 RX ADMIN — Medication 10 MG: at 21:27

## 2021-08-25 ASSESSMENT — ACTIVITIES OF DAILY LIVING (ADL)
ADLS_ACUITY_SCORE: 15

## 2021-08-25 ASSESSMENT — MIFFLIN-ST. JEOR: SCORE: 1621.5

## 2021-08-25 NOTE — TELEPHONE ENCOUNTER
MEDICATION APPEAL APPROVED    Medication: Appeal for Kineret - APPROVED   Authorization Effective Date: 8/25/2021  Authorization Expiration Date: 8/24/2022  Approved Dose/Quantity: 7 FOR 7 DAYS   Reference #:     Insurance Company: [employer] - Phone [phone] Fax [phone]  Expected CoPay:       CoPay Card Available:      Foundation Assistance Needed:    Which Pharmacy is filling the prescription (Not needed for infusion/clinic administered): [facility] - [place], [place] - [address]

## 2021-08-25 NOTE — PROGRESS NOTES
Cardiology Progress Note        Faculty Attestation  Duran Mccarty M.D.    COVID pneumonia  Orthotopic cardiac transplantation  Gout  Chronic immunosuppression with steroid dependence  DM  New pulmonary infiltrate concerning for fungal infection  Anemia with iron deficiency  CKD  Iron deficiency anemia    45 minutes reviewing films, coordinating care with ID and pulmonary    Plan:  1. Fungal pneumonia evaluation and probable bronchoscopy  2. Iron replacement therapy  3. Steroid maintenance  4. Add empiric anti-fungals  5. Retest for COVID  I personally saw and examined this patient, reviewed recent laboratories and imaging studies, discussed the case with the housestaff, and conveyed plan to the patient.  I answered all questions from patient . I agree with the examination, assessment and plan outlined here.      Results for LOIS FULLER (MRN [id number]) as of 8/25/2021 17:24    Results for ALINA LOIS DAGO (MRN [id number]) as of 8/25/2021 17:24   Ref. Range 7/27/2021 10:27 8/2/2021 08:50 8/5/2021 09:31 8/9/2021 09:10 8/17/2021 10:15 8/23/2021 09:18 8/24/2021 06:13 8/25/2021 05:42   Creatinine Latest Ref Range: 0.66 - 1.25 mg/dL 1.16 1.11 1.45 (H) 1.44 (H) 2.28 (H) 1.99 (H) 1.91 (H) 1.83 (H)              Ref. Range 8/25/2021 05:42   Sodium Latest Ref Range: 133 - 144 mmol/L 135   Potassium Latest Ref Range: 3.4 - 5.3 mmol/L 4.0   Chloride Latest Ref Range: 94 - 109 mmol/L 106   Carbon Dioxide Latest Ref Range: 20 - 32 mmol/L 24   Urea Nitrogen Latest Ref Range: 7 - 30 mg/dL 32 (H)   Creatinine Latest Ref Range: 0.66 - 1.25 mg/dL 1.83 (H)   GFR Estimate Latest Ref Range: >60 mL/min/1.73m2 38 (L)   Calcium Latest Ref Range: 8.5 - 10.1 mg/dL 7.5 (L)   Anion Gap Latest Ref Range: 3 - 14 mmol/L 5   Magnesium Latest Ref Range: 1.6 - 2.3 mg/dL 1.6   Glucose Latest Ref Range: 70 - 99 mg/dL 215 (H)   WBC Latest Ref Range: 4.0 - 11.0 10e3/uL 7.0   Hemoglobin Latest Ref Range: 13.3 - 17.7 g/dL 7.9 (L)   Hematocrit Latest  Ref Range: 40.0 - 53.0 % 26.5 (L)   Platelet Count Latest Ref Range: 150 - 450 10e3/uL 252   RBC Count Latest Ref Range: 4.40 - 5.90 10e6/uL 2.91 (L)   MCV Latest Ref Range: 78 - 100 fL 91   MCH Latest Ref Range: 26.5 - 33.0 pg 27.1   MCHC Latest Ref Range: 31.5 - 36.5 g/dL 29.8 (L)   RDW Latest Ref Range: 10.0 - 15.0 % 16.8 (H)   % Neutrophils Latest Units: % 82   % Lymphocytes Latest Units: % 9   % Monocytes Latest Units: % 9   % Eosinophils Latest Units: % 0   Absolute Basophils Latest Ref Range: 0.0 - 0.2 10e3/uL 0.0   % Basophils Latest Units: % 0   Absolute Neutrophil Latest Ref Range: 1.6 - 8.3 10e3/uL 5.7   Absolute Lymphocytes Latest Ref Range: 0.8 - 5.3 10e3/uL 0.6 (L)   Absolute Monocytes Latest Ref Range: 0.0 - 1.3 10e3/uL 0.6   Absolute Eosinophils Latest Ref Range: 0.0 - 0.7 10e3/uL 0.0   RBC Morphology Unknown Confirmed RBC Indices   Platelet Morphology Latest Ref Range: Automated Count Confirmed. Platelet morphology is normal.  Automated Count Confirmed. Platelet morphology is normal.   RBC Fragments Latest Ref Range: None Seen  Slight (A)   Acanthocytes Latest Ref Range: None Seen  Slight (A)   Elliptocytes Latest Ref Range: None Seen  Slight (A)   Tacrolimus Last Dose Date Unknown See Comment   Tacrolimus Last Dose Time Unknown See Comment   Tacrolimus Level Latest Ref Range: 5.0 - 15.0 ug/L 9.0                     Assessment & Plan   Mr. Jim Willingham is a 66yr old male with a history of NICM (s/p HM2 LVAD 6/2017), VT, AFib, CKD, DMII, COPD, now s/p OHT 5/6/21, who presented to the ER with shortness of breath and headache, found to have COVID PNA.     Changes Today:  - Continue remdesivir  - Restart PTA prednisone  - Discuss with ID regarding CT findings results   - Follow up tacro level     NICM, s/p OHT 5/6/21  His post-transplant course has been c/b right pleural air leak (required prolonged chest tube), pAFib, CAP (RLL, 6/2021), recurrent pleural effusions (s/p thoracenteses x2 6/2021 and  7/2021), RLL cavitary lesions (per chest CT 7/14/21, BD glucan indeterminate, aspergillus negative), pericardial effusion (1.4cm per TTE 7/12/21, conservatively managed), low-grade EBV viremia, and recurrent/persistent gout flare.     Rejection history:  none  AlloMap scores:  n/a  DSAs:  none  Coronary angio/Ischemic eval:  Deferred due to renal dysfunction  Last RHC:  8/5/21, mildly elevated biventricular filling pressures with RA 10, mPA 25, PCW 14, and CI 2.95.  Echo/cMRI:  TTE 8/5/21 showed stable graft function, with LVEF 60-65% and normal RV size/function, and trivial loculated pericardial effusion.     Serostatus:  - CMV D+/R+  - EBV D+/R+  - Toxo D-/R-     Immunosuppression:  - Restart baseline prednisone 5mg   - Holding MMF (PTA dose 1500mg twice daily)  - continue tacrolimus, goal level 8-10.  Continue tacro 4mg twice daily (PTA dose)   - Tacro level pending     PPx:  - CAV:  Aspirin 81mg daily.  Statin has been held due to elevated LFTs, defer restarting now.  - GI:  Pantoprazole 40mg daily  - Osteoporosis:  Calcium/vitamin D supplements  - CMV:  Completed 3m Valcyte therapy --> checking CMV levels today  - PCP:  Received pentamidine 8/11, due q28 days  - Thrush:  Clotrimazole troches QID -- started 8/5 to augment tacro levels  - Toxo:  n/a     Graft function:  - BPs:  Controlled  - HRs:  Tachycardic, 100s  - fluid status:  Euvolemic, not on diuretics (NT-ProBNP 3k)        COVID-19 PNA  Presented with dyspnea and fevers. Found to be covid positive. He is not currently showing any signs of bacterial pneumonia, but per ID, he is at risk for developing superimposed bacterial and fungal infections given his recent high-dose steroids (for gout) and RLL pneumonia.   D-Dimer 3.27.   - Status post monoclonal ab x1.  - Starting lovenox 0.5mg/kg subcutaneous q12 hours  - COVID-19 monoclonal antibodies x1 --- ok'd and recommended per ID, discussed with pharmacy  - Continue remdesivir 100mg daily for a total of 5  days (day 2/5)  - hydrocortisone 100mg IV x 1 given (for adrenal insufficiency).  Monitor vitals  - Per ID, no indication for additional antibiotics currently but will continue to closely monitor clinical status  - CMV quant not detected      Trivial pericardial effusion  Per TTE 7/12/21, will repeat TTE.     RLL pneumonia  Recurrent exudative pleural effusions (6/2021, 7/2021)  RLL cavitary lesion vs infiltrate  The effusion was negative for fungal/bacterial/AFB cx.  Treated with IV ABx then levaquin.  Urine and serum Blasto and Histo Ag were negative, A galactomannan was negative, and BD glucan was intermediate.  No antifungal therapy was given with repeat CT 7/23/2021 showing improvement.  - repeating chest CT showed peripheral consolidative opacity in the left upper lobe which  appears partially cavitating  - Will follow up with transplant ID     EBV viremia  EBV has been lowly-detected.  - EBV level today     Gout/pseudogout  Predated transplant.  Has had recurrent flares following transplant, and continues to follow with rheumatology.  He notes that his current flare started in his fingers, and has now progressed up to his elbow.  The pain limits his activity.  He has been treated with Anakinra, with positive results, but has not been approved for outpatient use by his insurance.  He recently completed a steroid burst per rheum (30mg x 4 days, 20mg x 4 days, 10mg x 4 days, then back to 5mg per transplant protocol), with little effect, so was advised to start a prednisone 40mg x 5 days today -- which he has not yet started.  He was given a one-time dose of hydrocortisone for adrenal insufficiency this am.  Spoke with rheumatology --> will plan to monitor his symptoms following the hydrocortisone which seem to be improving per patient report.  - consult rheum if worsening gout for consideration of anakinra   - defer PTA allopurinol therapy given active flare     Iron deficiency anemia  Iron sat 8/5 was 13%.  He  was started on po iron, but note that absorption is impaired while on po PPI.  Will plan to administer IV iron once he is clinically stable.     WALTER on CKD  Creatinine had been stable at 1.4-1.6, but has been elevated to 1.9-2s in the recent weeks.  Likely ddx include tacro therapy and mild hypovolemia.  He received IVF in the ED.  He is not on diuretics.  Will closely monitor tacro levels.  - continue to trend BMP daily     Transaminitis, resolved  Auestionable Choledocholithiasis  Abdominal ultrasound 7/14/21 showing hepatomegaly with steatosis and no biliary dilatation.  LFTs stable on arrival today.  - trend LFTs     Severe protein calorie malnutrition  History of Sylvester En Y gastric bypass  Albumin on arrival 2.6.  - nutrition consult, appreciate rec's      DMII:  Starting lantus 15units at bedtime (PTA 18units), med sliding scale insulin, hypoglycemia protocol.  May need to consider endo consult pending BG trend.  Depression:  PTA wellbutrin 75mg BID  COPD:  PTA singulair, Trelegy Ellipta, Albuterol.           FEN: regular diet  PROPHY:  ambulate  LINES:  PIV  DISPO:  pending  CODE STATUS:  Full code           The above was reviewed with Dr. Mccarty.    Corey Robertson MD  Cardiology Fellow  [phone]    Interval History   MARIVEL overnight. VSS. Reviewed RN notes. Feels slightly short of breath this morning, better with his CPAP. Able to ambulate to the restroom without shortness of breath. Denies leg swelling. Gout pain has improved today.      Physical Exam   Temp: 98.9  F (37.2  C) Temp src: Axillary BP: (!) 114/90 Pulse: 106   Resp: 14 SpO2: 99 % O2 Device: None (Room air) Oxygen Delivery: 2 LPM  Vitals:    08/24/21 0515 08/25/21 0450   Weight: 85.1 kg (187 lb 11.2 oz) 85.7 kg (188 lb 15 oz)     Vital Signs with Ranges  Temp:  [97.8  F (36.6  C)-98.9  F (37.2  C)] 98.9  F (37.2  C)  Pulse:  [104-129] 106  Resp:  [14-18] 14  BP: (112-137)/(82-90) 114/90  FiO2 (%):  [30 %] 30 %  SpO2:  [94 %-100 %] 99 %  I/O  "last 3 completed shifts:  In: 2070 [P.O.:720; IV Piggyback:1350]  Out: 700 [Urine:700]     , Blood pressure (!) 114/90, pulse 106, temperature 98.9  F (37.2  C), temperature source Axillary, resp. rate 14, height 1.727 m (5' 8\"), weight 85.7 kg (188 lb 15 oz), SpO2 99 %.  188 lbs 14.95 oz  GEN:  Alert, oriented x 3, appears comfortable, NAD.  CV:  Regular rate and rhythm, no murmur or JVD.  S1 + S2 noted, no S3 or S4.  LUNGS:  Breathing comfortably. Fine crackles bilaterally.   ABD:  Active bowel sounds, soft, non-tender/non-distended.  No rebound/guarding/rigidity.  EXT:  No edema or cyanosis.  Mild swelling of L 2/3 MTP and wrist without overlying erythema. Full ROM of left elbow.      Medications     ACE/ARB/ARNI NOT PRESCRIBED       BETA BLOCKER NOT PRESCRIBED         remdesivir  100 mg Intravenous Q24H    And     sodium chloride 0.9%  50 mL Intravenous Q24H     amitriptyline  25 mg Oral At Bedtime     aspirin  81 mg Oral Daily     buPROPion  75 mg Oral BID     calcium carbonate 600 mg-vitamin D 400 units  1 tablet Oral BID w/meals     clotrimazole  1 lozenge Buccal 4x Daily     enoxaparin ANTICOAGULANT  0.5 mg/kg Subcutaneous Q12H     fluticasone-vilanterol  1 puff Inhalation Daily     gabapentin  300 mg Oral TID     insulin aspart  1-7 Units Subcutaneous TID AC     insulin aspart  1-5 Units Subcutaneous At Bedtime     insulin glargine  15 Units Subcutaneous At Bedtime     melatonin  10 mg Oral At Bedtime     montelukast  10 mg Oral At Bedtime     pantoprazole  40 mg Oral QAM AC     predniSONE  5 mg Oral Daily     sodium chloride (PF)  3 mL Intracatheter Q8H     tacrolimus  4 mg Oral BID IS     umeclidinium  1 puff Inhalation Daily       Data   Recent Labs   Lab 08/25/21  0936 08/25/21  0542 08/25/21  0204 08/24/21  0613 08/23/21  0918 08/23/21  0918   WBC  --  7.0  --  5.1  --  5.1   HGB  --  7.9*  --  8.8*  --  8.9*   MCV  --  91  --  90  --  90   PLT  --  252  --  295  --  328   NA  --  135  --  136  --  135 "   POTASSIUM  --  4.0  --  4.1  --  4.0   CHLORIDE  --  106  --  106  --  103   CO2  --  24  --  26  --  27   BUN  --  32*  --  32*  --  34*   CR  --  1.83*  --  1.91*  --  1.99*   ANIONGAP  --  5  --  4  --  5   QAMAR  --  7.5*  --  8.0*  --  8.3*   * 215* 321* 192*   < > 172*   ALBUMIN  --   --   --  2.6*  --   --    PROTTOTAL  --   --   --  6.2*  --   --    BILITOTAL  --   --   --  0.6  --   --    ALKPHOS  --   --   --  122  --   --    ALT  --   --   --  17  --   --    AST  --   --   --  10  --   --    TROPONIN  --   --   --  <0.015  --   --     < > = values in this interval not displayed.       Recent Results (from the past 24 hour(s))   CT Chest w/o Contrast    Narrative    CT CHEST W/O CONTRAST 8/24/2021 4:41 PM    History: Pneumonia, effusion or abscess suspected, x-ray done;  Respiratory illness, nondiagnostic x-ray    Comparison: Chest abdomen pelvis CT 7/23/2021, chest CT 7/13/2021,  same day chest radiograph    Technique: CT of the chest was obtained without intravenous contrast.  Axial, coronal, and sagittal reconstructions were obtained and  reviewed.    Findings:     Lungs: There is peripheral consolidative opacity in the left upper  lobe, which appears partially cavitating. Previous right lower lobe  cavitary lesion has resolved, with residual scarring. Similar extent  of right basilar atelectasis/scarring with traction bronchiectasis.  Bronchiectasis in the left lower lobe. Nodular-like thickening in the  lateral aspect of the pleura overlying the right lower lobe is stable.  Continued loculated right pleural effusion, with extension to the  anterior mediastinum. Pneumothorax component has resolved. Multiple  pulmonary nodules, measuring up to 5 mm in the left upper lobe, which  are new (series 6 image 73). There is tree-in-bud nodularity in the  medial right upper lobe (series 6 image 81)  Airways: Small amount of debris within the trachea.  Vessels: Main pulmonary artery and aorta are normal in  caliber.  Abandoned ICD lead.  Heart: Postsurgical changes of heart transplant. Heart size is normal  without pericardial effusion  Lymph nodes: No suspicious mediastinal or hilar lymphadenopathy.  Thyroid: Within normal limits.  Esophagus: Within normal limits    Upper abdomen: Surgical changes of gastric bypass. Otherwise  unremarkable.    Bones and soft tissues: No suspicious axillary lymphadenopathy or soft  tissue mass. No suspicious osseous lesion. Degenerative changes in the  spine.      Impression    Impression:   1. New peripheral consolidative opacity in the left upper lobe which  appears partially cavitating. There are also multiple new pulmonary  nodules measuring up to 5 mm. Differential includes pneumonia,  including atypical pneumonias with possible developing abscess  formation, organizing pneumonia, and septic pulmonary emboli. Previous  right lower lobe cavitary lesion appears resolved with residual  scarring.  2. Continued loculated right pleural effusion and anterior mediastinal  simple fluid collection. Pneumothorax component has resolved.  3. Stable bilateral lower lobe bronchiectasis and scarring.    I have personally reviewed the examination and initial interpretation  and I agree with the findings.    RODRIGUEZ GAY MD         SYSTEM ID:  [id number]

## 2021-08-25 NOTE — PROGRESS NOTES
[facility]    Transplant Infectious Diseases Inpatient Progress Note      Jim Willingham MRN# [id number]   YOB: 1955 Age: 66 year old   Date of Admission and time: 8/24/2021  5:09 AM             Recommendations:   1. Please repeat COVID-19 test (ordered for you).   2. Please consult pulmonary for possible bronchoscopy.   3. BD glucan, A galactomannan, CRAG (ordered for you).  4. Urine for Histoplasma Ag.   5. Please avoid high dose steroids for gout/pseudogout therapy.   - May use anakinra and other non-steroidal agents for gout/pseudogout control.   6. Continue remdesivir for a total of 5 days for now.   7. Please start vancomycin, zosyn 3.375 g q6 hr, micafungin 150 mg daily.   8. Nasal swab for MRSA screen (ordered for you).         Summary of Presentation:   Transplants:  5/6/2021 (Heart), Postoperative day:  111     This patient is a 66 year old male with NICMP s/p LVAD in 2017 and OHT in 5/2021. Received basiliximab for induction. Currently on TAC/MMF (held due to COVID-19)/prednisone at high dose for gout/pseudogout.   Now with COVID-19 infection.         Active Problems and Infectious Diseases Issues:   1. COVID-19 infection.   2. KALI consolidation/pneumonia.     I am now not certain that the patient has COVID-19 since he is satting 100% on room air with CT findings not c/w COVID-19.   The other possibility is COVID-19 infection in addition to KALI pneumonia.     The differential diagnosis for the KALI pneumonia in this immunocompromised patient is bacterial (P aeruginosa, S aureus, other....), aspiration pneumonia (patient reported episodes of aspiration), fungal infection (high dose steroids for gout/pseudogout), and less likely mycobacterial infection.   It is interesting that the patient has similar findings in the RLL in 7/2021, I am wondering if this is due to aspiration.     Will initiate workup to rule bacterial vs fungal vs mycobacteria vs aspiration  pneumonia.     For COVID-19 will repeat the test to rule ou false positive test.   The patient is on room air; he received one dose of monoclonal AB and currently on remdesivir.   Meanwhile will initiate vancomycin, zosyn, micafungin.   Will check MRSA screening to evaluate the need for vancomycin.          Old Problems and Infectious Diseases Issues:   1. RUPINDER hominis BSI in 9/2020 treated with vancomycin for 6 weeks, presumed to be LVAD related.  2. MRSE in a hand joint in broth only deemed a contaminant on 7/8/2021 with inflamed joint due to acute pseudogout.   3. RLL pneumonia with exudative pleural effusion in 7/2021. The effusion was negative for fungal/bacterial/AFB cx. Treated with IV ABx then levaquin. Urine and serum Blasto and Histo Ag were negative, A galactomannan was negative for BD glucan was intermediate. No antifungal therapy was given with repeat CT 7/23/2021 showed improvement.    Other Infectious Disease issues include:  - QTc 420 as of 8/24/2021.   - PCP prophylaxis: bactrim and dapsone and most recently pentamidine on 7/11/2021 and 8/11/2021.   - Serostatus: CMV D+/R+, EBV D+/R+, HSV1+/2-, VZV +, Toxo D-/R-  - Immunization status: when the patient is more stable, he will need the prevnar then the pneumovax vaccines, also the shingrix vaccine and the COVID-19 vaccine but when he is not on such a high dose of prednisone.   - Gamma globulin status: 838 pre-transplant.         Attestation:  I interviewed the patient and obtained history from the patient, and by reviewing the patient's chart including outside records, microbiological data, and radiological data. All data are summarized in this notes.  Catherine Grier MD  [facility]  Contact information available via [facility] Paging/Directory    08/25/2021           Interim History:   Still with subjective shortness of breath, dry cough and left sided pleuritic chest pain.          History of Present Illness:    Transplants:  5/6/2021 (Heart), Postoperative day:  111     This patient is a 66 year old male with NICMP s/p LVAD in 2017 then OHT in 5/2021.   Recently hospitalized in 6/2021 and 7/2021 with RLL pneumonia and effusion.   In 6/2021 he was admitted with cough, fever and shortness of breath and was treated with IV vancomycin and cefepime then zithromax was added and eventually the patient was discharged on levaquin. He was readmitted in 7/2021 with fatigue, repeat CT showing cavity in the RLL and right effusion. The effusion was an exudate with negative fungal, bacteria, and AFB. Urine and serum Blasto and Histo Ag were negative, A galactomannan was negative for BD glucan was intermediate. No antifungal therapy was given with repeat CT 7/23/2021 showed improvement.    He also suffered from pseudogout treated with high dose steroids.     The patient started to experience fever and shortness 2 days ago. He presented to ED 8/24/2021 with the same and was found with COVID-19. ID consulted.   In the ED he started to cough and experience diarrhea of softer stool.             Review of Systems:      As mentioned in the HPI otherwise negative by reviewing constitutional symptoms, central and peripheral neurological systems, respiratory system, cardiac system, GI system,  system, musculoskeletal, skin, allergy, and lymphatics.                Immunizations:     Immunization History   Administered Date(s) Administered     Influenza Quad, Recombinant, p-free (RIV4) 01/24/2020     Influenza, Quad, High Dose, Pf, 65yr + 09/26/2020     Pneumo Conj 13-V (2010&after) 02/23/2015     Tdap (Adacel,Boostrix) 11/02/2020            Allergies:     Allergies   Allergen Reactions     Grass Shortness Of Breath     Ace Inhibitors Cough     Dust Mites Other (See Comments)     Asthma     Mold Other (See Comments)     Asthma     Penicillins Other (See Comments)     Unknown - childhood exposure    Tolerated Zosyn 9/18-9/20/2020     Sulfa Drugs  Other (See Comments) and Unknown     Unknown childhood reaction             Medications:   Medications that Require Transfusion:     ACE/ARB/ARNI NOT PRESCRIBED       BETA BLOCKER NOT PRESCRIBED       Scheduled Medications:     remdesivir  100 mg Intravenous Q24H    And     sodium chloride 0.9%  50 mL Intravenous Q24H     amitriptyline  25 mg Oral At Bedtime     aspirin  81 mg Oral Daily     buPROPion  75 mg Oral BID     calcium carbonate 600 mg-vitamin D 400 units  1 tablet Oral BID w/meals     clotrimazole  1 lozenge Buccal 4x Daily     enoxaparin ANTICOAGULANT  0.5 mg/kg Subcutaneous Q12H     fluticasone-vilanterol  1 puff Inhalation Daily     gabapentin  300 mg Oral TID     insulin aspart  1-7 Units Subcutaneous TID AC     insulin aspart  1-5 Units Subcutaneous At Bedtime     insulin glargine  15 Units Subcutaneous At Bedtime     melatonin  10 mg Oral At Bedtime     montelukast  10 mg Oral At Bedtime     pantoprazole  40 mg Oral QAM AC     predniSONE  5 mg Oral Daily     sodium chloride (PF)  3 mL Intracatheter Q8H     tacrolimus  4 mg Oral BID IS     umeclidinium  1 puff Inhalation Daily            Physical Exam:   Temp: 98.9  F (37.2  C) Temp src: Axillary BP: (!) 114/90 Pulse: 106   Resp: 14 SpO2: 99 % O2 Device: None (Room air) Oxygen Delivery: 2 LPM    Wt Readings from Last 4 Encounters:   08/25/21 85.7 kg (188 lb 15 oz)   08/05/21 93.6 kg (206 lb 4.8 oz)   07/18/21 89.4 kg (197 lb 3.2 oz)   07/05/21 88.7 kg (195 lb 9.6 oz)     Constitutional: awake, alert, cooperative, no apparent distress and appears at stated age, well nourished.   Neurologic: Patient is moving all extremities without focal deficit, no focal sensory loss.   Lungs: CTA bilaterally, no accessory muscle use, no dullness to percussion and no abnormal tactile fremitus.   CVS: RRR, normal S1/S2, no murmur, PMI was not displaced.   Abdomen: non-tender, non-distended, no masses, no bruit, no shifting dullness, normal BS.   Skin: no induration,  fluctuation or discharge, and no rash            Data:   No results found for: ACD4    Inflammatory Markers    Recent Labs   Lab Test 07/19/21  0630 07/18/21  0552 07/16/21  0614 07/09/21  0632 07/08/21  1746 07/08/21  1533 06/28/21  2208 05/27/21  0449 05/26/21  0705 10/27/20  1250 10/20/20  1305 10/13/20  1320 10/06/20  1320 09/30/20  1130 04/13/18  1437 05/26/17  0700   SED  --   --   --   --  72*  --   --   --   --  10 8 10 20 9 18 25*   CRP 13.0* 7.7 4.2 98.0*  --  87.0* 27.0* 140.0* 220.0* 3.5 11.0* 12.0* 5.7 0.6* 9.4*  --        Immune Globulin Studies     Recent Labs   Lab Test 12/21/20  1133      IGM 55          Metabolic Studies       Recent Labs   Lab Test 08/25/21  0936 08/25/21  0542 08/25/21  0204 08/24/21  2221 08/24/21  1758 08/24/21  0613 08/24/21  0559 08/23/21  0918 08/17/21  1015 08/09/21  0910 08/05/21  0931 08/05/21  0931 07/09/21  0632 07/08/21  1533 07/01/21  0644 06/30/21  0339 06/24/21  0905 06/21/21  0933 06/07/21  0807 05/05/21  0628 05/04/21  2313   NA  --  135  --   --   --  136  --  135 134 139  --  137   < > 134  --  130*  --  131* 140  --  135   POTASSIUM  --  4.0  --   --   --  4.1  --  4.0 4.5 3.7  --  4.3   < > 4.6  --  4.8  --  4.8 4.2  --  4.0   CHLORIDE  --  106  --   --   --  106  --  103 104 108  --  107   < > 104  --  101  --  97 105  --  101   CO2  --  24  --   --   --  26  --  27 25 25  --  24   < > 22  --  25  --  26 30  --  26   ANIONGAP  --  5  --   --   --  4  --  5 5 6  --  6   < > 7  --  4  --  8 5  --  7   BUN  --  32*  --   --   --  32*  --  34* 51* 23  --  27   < > 37*  --  47*  --  46* 41*  --  50*   CR  --  1.83*  --   --   --  1.91*  --  1.99* 2.28* 1.44*  --  1.45*   < > 2.37*  --  2.24*  --  2.31* 1.93*  --  1.60*   GFRESTIMATED  --  38*  --   --   --  36*  --  34* 29* 51*  --  51*   < > 28*  --  30*  --  29* 36*  --  45*   * 215* 321* 392* 403* 192*  --  172* 149* 94   < > 85   < > 133*  --  157*  --  285* 128*  --  115*   A1C  --   --    --   --   --   --   --   --   --   --   --   --   --   --   --   --   --   --   --   --  5.1   QAMAR  --  7.5*  --   --   --  8.0*  --  8.3* 8.1* 8.2*  --  8.2*   < > 7.9*  --  8.3*  --  8.6 8.2*  --  9.1   PHOS  --   --   --   --   --   --   --   --   --   --   --  2.4*  --   --   --  2.8  --  3.7 3.7   < > 3.8   MAG  --  1.6  --   --   --  1.5*  --   --   --   --   --  1.8   < >  --    < > 2.2  --  1.9 1.7  --  2.3   LACT  --   --   --   --   --   --  0.8  --   --   --   --   --   --  1.0  --  0.7   < >  --   --    < >  --    CKT  --   --   --   --   --   --   --   --   --   --   --   --   --   --   --   --   --  31 49  --   --     < > = values in this interval not displayed.       Hepatic Studies    Recent Labs   Lab Test 08/24/21  0613 08/05/21  0931 07/27/21  1027 07/19/21  0630 07/18/21  0552 07/17/21  0626 07/13/21  2053 07/10/21  0624   BILITOTAL 0.6 0.5 0.4 0.3 0.3 0.4  --   --    ALKPHOS 122 281* 516* 537* 577* 573*  --   --    ALBUMIN 2.6* 2.9* 3.0* 2.2* 2.2* 2.4*  --   --    AST 10 12 41 118* 211* 162*  --   --    ALT 17 36 152* 233* 278* 273*  --   --    LDH  --   --   --   --   --   --  252* 220       Pancreatitis testing    Recent Labs   Lab Test 05/26/21  1340 05/04/21  2313 11/05/20  0907 08/05/20  0335 07/06/18  0856 06/08/17  0944   AMYLASE 49 93  --   --   --   --    LIPASE 25*  --   --  168  --   --    TRIG  --   --  91  --  123 124       Hematology Studies      Recent Labs   Lab Test 08/25/21  0542 08/24/21  0613 08/23/21  0918 08/17/21  1015 08/09/21  0910 08/05/21  1247 08/05/21  0931 07/27/21  1027 07/16/21  1608 07/09/21  0632 07/08/21  1746 07/08/21  1533   WBC 7.0 5.1 5.1 6.1 4.1  --  4.2 4.6  --    < > 8.3 Canceled, Test credited   ANEU 5.7 4.0  --   --   --   --  2.6 2.6  --   --  7.7 Canceled, Test credited   ALYM 0.6* 0.4*  --   --   --   --  1.2 1.5  --   --  0.3* Canceled, Test credited   VIJAY 0.6 0.6  --   --   --   --  0.2 0.5  --   --  0.1 Canceled, Test credited   AEOS 0.0 0.0  --    --   --   --  0.1 0.1  --   --  0.0 Canceled, Test credited   HGB 7.9* 8.8* 8.9* 9.0* 8.8* 9.0* 9.2* 8.6*  --    < > 8.9* Canceled, Test credited   HCT 26.5* 28.9* 29.7* 29.5* 29.1*  --  31.4* 29.2*  --    < > 28.9* Canceled, Test credited    295 328 342 284  --  280 232   < >   < > 281 Canceled, Test credited    < > = values in this interval not displayed.       Clotting Studies    Recent Labs   Lab Test 07/19/21  0630 07/18/21  0552 07/17/21  0626 07/16/21  0614 05/07/21  0340 05/06/21  0650 05/06/21  0426 05/04/21  2313 09/17/20  1554   INR 1.16* 1.11 1.12 1.07   < > 1.45* 2.02* 2.00* 3.68*   PTT  --   --   --   --   --  35 26 37 51*    < > = values in this interval not displayed.       Arterial Blood Gas Testing    Recent Labs   Lab Test 08/24/21  1142 08/24/21  0559 06/28/21  2247 05/09/21  0956 05/09/21  0902 05/07/21  1900 05/07/21  1700 05/07/21  1420 05/07/21  0340 05/06/21  2152   PH  --  7.40  --   --   --  7.40 7.38 7.39 7.38 7.37   PCO2  --   --   --   --   --  38 41 40 41 44   PO2  --   --   --   --   --  101 102 130* 153* 148*   HCO3  --   --   --   --   --  23 24 24 24 25   O2PER 99  --  21.0 21 21 REPORT AMENDED: 2LNC Canceled, Test credited  2L 40 40.0  40.0 40  40        Urine Studies     Recent Labs   Lab Test 08/24/21  0726 07/09/21  0640 06/30/21  0640 05/25/21  1230 05/16/21  0528   URINEPH 7.0 5.5 5.0 5.5 5.5   NITRITE Negative Negative Negative Negative Negative   LEUKEST Negative Negative Negative Negative Negative   WBCU 2 3 4 4 1       Vancomycin Levels     Recent Labs   Lab Test 05/08/21 2004 05/07/21  1701 10/27/20  1250 10/20/20  1305 10/13/20  1320 10/06/20  1320   VANCOMYCIN 18.5 16.0 19.3 16.5 13.4 16.1       Tobramycin levels     No lab results found.    Gentamicin levels    No lab results found.    Tacrolimus levels    Invalid input(s): TACROLIMUS, TAC, TACR  Transplant Immunosuppression Labs Latest Ref Rng & Units 8/25/2021 8/24/2021 8/23/2021 8/17/2021 8/9/2021    Tacro Level 5.0 - 15.0 ug/L - - 6.0 13.8 15.6(H)   Tacro Level - - - - - -   Creat 0.66 - 1.25 mg/dL 1.83(H) 1.91(H) 1.99(H) 2.28(H) 1.44(H)   BUN 7 - 30 mg/dL 32(H) 32(H) 34(H) 51(H) 23   WBC 4.0 - 11.0 10e3/uL 7.0 5.1 5.1 6.1 4.1   Neutrophil % 82 79 - - -   ANEU 1.6 - 8.3 10e3/uL 5.7 4.0 - - -       Microbiology:  Blood cx 8/24/2021 pending.   Last check of C difficile  No results found for: CDBPCT    Virology:  CMV viral loads    CMV Quant IU/mL   Date Value Ref Range Status   06/29/2021 CMV DNA Not Detected CMVND^CMV DNA Not Detected [IU]/mL Final     Comment:     Mutations within the highly conserved regions of the viral genome covered by   the KEENA AmpliPrep/KEENA TaqMan CMV Test primers and/or probes have been   identified and may result in under-quantitation of or failure to detect the   virus.  Supplemental testing methods should be used for testing when this is   suspected.  The KEENA AmpliPrep/KEENA TaqMan CMV Test is an FDA-approved in vitro nucleic   acid amplification test for the quantitation of cytomegalovirus DNA in human   plasma (EDTA plasma) using the KEENA AmpliPrep Instrument for automated viral   nucleic acid extraction and the [employer] TaqMan Analyzer or Style on Screen for   automated Real Time amplification and detection of the viral nucleic acid   target.  Titer results are reported in International Units/mL (IU/mL using 1st WHO   International standard for Human Cytomegalovirus for Nucleic Acid   Amplification based assays. The conversion factor between CMV DNA copis/mL (as   defined by the Roche KEENA TaqMan CMV test) and International Units is the   CMV DNA concentration in IU/mL x 1.1 copies/IU = CMV DNA in copies/mL.  This assay has received FDA approval for the testing of human plasma only. The   Infectious Disease Diagnostic Laboratory at the [facility], [place], has validated the performance characteristics of the   Roche CMV assay for plasma, bronchial  alveolar lavage/wash and urine.     06/28/2021 Canceled, Test credited CMVND^CMV DNA Not Detected [IU]/mL Final     Comment:     Quantity not sufficient  NOTIFIED VIA ED TRACK BOARD AT 2355 ON 6/28/21 ELK     06/07/2021 CMV DNA Not Detected CMVND^CMV DNA Not Detected [IU]/mL Final     Comment:     Mutations within the highly conserved regions of the viral genome covered by   the KEENA AmpliPrep/KEENA TaqMan CMV Test primers and/or probes have been   identified and may result in under-quantitation of or failure to detect the   virus.  Supplemental testing methods should be used for testing when this is   suspected.  The KEENA AmpliPrep/KEENA TaqMan CMV Test is an FDA-approved in vitro nucleic   acid amplification test for the quantitation of cytomegalovirus DNA in human   plasma (EDTA plasma) using the KEENA AmpliPrep Instrument for automated viral   nucleic acid extraction and the [employer] TaqMan Analyzer or [employer] TaqMan for   automated Real Time amplification and detection of the viral nucleic acid   target.  Titer results are reported in International Units/mL (IU/mL using 1st WHO   International standard for Human Cytomegalovirus for Nucleic Acid   Amplification based assays. The conversion factor between CMV DNA copis/mL (as   defined by the Roche KEENA TaqMan CMV test) and International Units is the   CMV DNA concentration in IU/mL x 1.1 copies/IU = CMV DNA in copies/mL.  This assay has received FDA approval for the testing of human plasma only. The   Infectious Disease Diagnostic Laboratory at the [facility], [place], has validated the performance characteristics of the   Roche CMV assay for plasma, bronchial alveolar lavage/wash and urine.       CMV DNA IU/mL   Date Value Ref Range Status   08/24/2021 Not Detected Not Detected IU/mL Final   07/13/2021 Not Detected Not Detected IU/mL Final     CMV viral loads    Recent Labs   Lab Test 08/24/21  1143 07/13/21  2053 06/29/21  1412  06/28/21  2208 06/07/21  0807   CMVQNT Not Detected Not Detected CMV DNA Not Detected Canceled, Test credited CMV DNA Not Detected   CSPEC  --   --  Plasma Canceled, Test credited Plasma   CMVLOG  --   --  Not Calculated Canceled, Test credited Not Calculated       CMV viral loads    CMV Quant IU/mL   Date Value Ref Range Status   06/29/2021 CMV DNA Not Detected CMVND^CMV DNA Not Detected [IU]/mL Final     Comment:     Mutations within the highly conserved regions of the viral genome covered by   the KEENA AmpliPrep/KEENA TaqMan CMV Test primers and/or probes have been   identified and may result in under-quantitation of or failure to detect the   virus.  Supplemental testing methods should be used for testing when this is   suspected.  The KEENA AmpliPrep/KEENA TaqMan CMV Test is an FDA-approved in vitro nucleic   acid amplification test for the quantitation of cytomegalovirus DNA in human   plasma (EDTA plasma) using the KEENA AmpliPrep Instrument for automated viral   nucleic acid extraction and the [employer] TaqMan Analyzer or [employer] TaqMan for   automated Real Time amplification and detection of the viral nucleic acid   target.  Titer results are reported in International Units/mL (IU/mL using 1st WHO   International standard for Human Cytomegalovirus for Nucleic Acid   Amplification based assays. The conversion factor between CMV DNA copis/mL (as   defined by the Roche KEENA TaqMan CMV test) and International Units is the   CMV DNA concentration in IU/mL x 1.1 copies/IU = CMV DNA in copies/mL.  This assay has received FDA approval for the testing of human plasma only. The   Infectious Disease Diagnostic Laboratory at the [facility], [place], has validated the performance characteristics of the   Roche CMV assay for plasma, bronchial alveolar lavage/wash and urine.     06/28/2021 Canceled, Test credited CMVND^CMV DNA Not Detected [IU]/mL Final     Comment:     Quantity not  sufficient  NOTIFIED VIA ED TRACK BOARD AT 2355 ON 6/28/21 ELK     06/07/2021 CMV DNA Not Detected CMVND^CMV DNA Not Detected [IU]/mL Final     Comment:     Mutations within the highly conserved regions of the viral genome covered by   the KEENA AmpliPrep/KEENA TaqMan CMV Test primers and/or probes have been   identified and may result in under-quantitation of or failure to detect the   virus.  Supplemental testing methods should be used for testing when this is   suspected.  The KEENA AmpliPrep/KEENA TaqMan CMV Test is an FDA-approved in vitro nucleic   acid amplification test for the quantitation of cytomegalovirus DNA in human   plasma (EDTA plasma) using the KEENA AmpliPrep Instrument for automated viral   nucleic acid extraction and the KEENA TaqMan Analyzer or [employer] TaqMan for   automated Real Time amplification and detection of the viral nucleic acid   target.  Titer results are reported in International Units/mL (IU/mL using 1st WHO   International standard for Human Cytomegalovirus for Nucleic Acid   Amplification based assays. The conversion factor between CMV DNA copis/mL (as   defined by the Roche KEENA TaqMan CMV test) and International Units is the   CMV DNA concentration in IU/mL x 1.1 copies/IU = CMV DNA in copies/mL.  This assay has received FDA approval for the testing of human plasma only. The   Infectious Disease Diagnostic Laboratory at the [facility], [place], has validated the performance characteristics of the   Roche CMV assay for plasma, bronchial alveolar lavage/wash and urine.       CMV DNA IU/mL   Date Value Ref Range Status   08/24/2021 Not Detected Not Detected IU/mL Final   07/13/2021 Not Detected Not Detected IU/mL Final     Log IU/mL of CMVQNT   Date Value Ref Range Status   06/29/2021 Not Calculated <2.1 [Log_IU]/mL Final   06/28/2021 Canceled, Test credited <2.1 [Log_IU]/mL Final     Comment:     Quantity not sufficient  NOTIFIED VIA ED TRACK BOARD  AT 2355 ON 6/28/21 ELK     06/07/2021 Not Calculated <2.1 [Log_IU]/mL Final       CMV resistance testing  No lab results found.  No results found for: CMVCID, CMVFOS, CMVGAN     COVID-19 PCR Results    COVID-19 PCR Results 5/4/21 5/12/21 5/20/21 6/12/21 6/12/21 6/17/21 6/17/21 6/28/21 7/8/21 7/17/21 8/3/21 8/24/21       1056 1056 0902 0902        COVID-19 Virus PCR to [facility] - Result    Test received-See reflex to IDDL test SARS CoV2 (COVID-19) Virus RT-PCR  Test received-See reflex to IDDL test SARS CoV2 (COVID-19) Virus RT-PCR         COVID-19 Virus PCR to [facility] - Source    Nasopharyngeal  Nasopharyngeal         SARS-CoV-2 Virus Specimen Source Nasopharyngeal Nasopharyngeal Nasopharyngeal  Nasopharyngeal  Nasopharyngeal Nasopharyngeal Nasopharyngeal      Flu A/B & SARS-COV-2 PCR Source               SARS-CoV-2 PCR Result NEGATIVE NEGATIVE NEGATIVE  NEGATIVE  NEGATIVE NEGATIVE NEGATIVE      SARS CoV2 PCR          Negative Negative Positive (A)   (A) Abnormal value       Comments are available for some flowsheets but are not being displayed.             No results found for: H6RES    EBV DNA Copies/mL   Date Value Ref Range Status   08/05/2021   Final     Comment:     EBV DNA Detetected below the reportable range of 500 copies/mL   06/28/2021 EBV DNA Not Detected EBVNEG^EBV DNA Not Detected [Copies]/mL Final   06/07/2021 3,509 (A) EBVNEG^EBV DNA Not Detected [Copies]/mL Final       CMV Antibody IgG   Date Value Ref Range Status   05/04/2021 >8.0 (H) 0.0 - 0.8 AI Final     Comment:     Positive  Antibody index (AI) values reflect qualitative changes in antibody   concentration that cannot be directly associated with clinical condition or   disease state.     09/10/2020 >8.0 (H) 0.0 - 0.8 AI Final     Comment:     Positive  Antibody index (AI) values reflect qualitative changes in antibody   concentration that cannot be directly associated with clinical condition or   disease state.         Toxoplasma Antibody IgG    Date Value Ref Range Status   09/10/2020 <3.0 0.0 - 7.1 IU/mL Final     Comment:     Negative- Absence of detectable Toxoplasma gondii IgG antibodies. A negative   result does not rule out acute infection.  The magnitude of the measured result is not indicative of the amount of   antibody present. The concentrations of anti-Toxoplasma gondii IgG in a given   specimen determined with assays from different manufacturers can vary due to   differences in assay methods and reagent specificity.           Imaging:  CT chest 8/24/21, reviewed by myself.   Impression:   1. New peripheral consolidative opacity in the left upper lobe which  appears partially cavitating. There are also multiple new pulmonary  nodules measuring up to 5 mm. Differential includes pneumonia,  including atypical pneumonias with possible developing abscess  formation, organizing pneumonia, and septic pulmonary emboli. Previous  right lower lobe cavitary lesion appears resolved with residual  scarring.  2. Continued loculated right pleural effusion and anterior mediastinal  simple fluid collection. Pneumothorax component has resolved.  3. Stable bilateral lower lobe bronchiectasis and scarring.    CXR 8/24/2021 reviewed by myself.   IMPRESSION: Small cavitary nodule of the medial right lower lobe on prior CTA chest not well seen by radiographic technique. Little change in scarring, pleural thickening, and small pleural effusion of the mid and lower right lung. Left lung largely   clear. Poststernotomy and coronary artery bypass grafting. 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 No visible pneumothorax.    CT c/a/p 7/23/2021 with contrast  IMPRESSION:   1. Decreased right lower lobe cavitary lesion, with nearly resolved  right basilar consolidative opacities. Stable to slightly increased  scattered infectious peribronchovascular nodular and tree-in-bud  opacities.  Continued attention on followup recommended.  2. Grossly similar anterior  mediastinal fluid collection as above.  This is favored to represent postoperative seroma given minimal change  compared to noncontrast chest CT and lack of significant associated  inflammatory fat stranding. This was previously aspirated on  7/15/2021, recommend correlating with these results as well as  patient's clinical status.  3. Decreased small residual right hydropneumothorax.  4. Continued bibasilar subsegmental atelectasis and predominately  right basilar bronchiectasis.  5. Cholelithiasis. Mild pericholecystic fluid is nonspecific and can  be seen in the setting of volume overload. No gallbladder wall  thickening.        Catherine Grier MD  [facility]  Contact information available via [facility] Paging/Directory     08/25/2021

## 2021-08-25 NOTE — PLAN OF CARE
Neuro: A&Ox4.   Cardiac: ST. VSS.   Respiratory: Sating 98% on RA.  GI/: Adequate urine output. Urine sample needed.  Diet/appetite: Tolerating regular diet. Eating fair. Denies nausea.   Activity:  Assist of 1, in room.  Pain: At acceptable level on current regimen. Intermittent rib pain, heat applied. Pt stated that pain not affected by tramadol.   Skin: No new deficits noted.  LDA's: PIVx1    Plan: Continue with POC. Notify primary team with changes. Covid swab recollected today, MRSA/MSSA swab completed, urine sample needed.

## 2021-08-25 NOTE — PROGRESS NOTES
Admission          8/24/2021  5:09 AM  -----------------------------------------------------------  Reason for admission:  Primary team notified of pt arrival.  Admitted from: [facility].  Via: stretcher  Accompanied by: none  Belongings: Placed in closet  Admission Profile: complete  Teaching: orientation to unit and call light- call light within reach, call don't fall, use of console, meal times, when to call for the RN, and enforced importance of safety   Access: PIV  Telemetry:Placed on pt  Ht./Wt.: complete  Code Status verified on armband: no-prior code, will get code status ordered by team.   2 RN Skin Assessment Completed By: Michelle AREVALO and April CARVAJAL   Med Rec completed: yes-completed previously.   Bed surface reassessed with algorithm and charted: yes  New bed surface ordered: no  Suction/Ambu bag/Flowmeter at bedside: yes    Pt status:    Temp:  [98.3  F (36.8  C)-101.4  F (38.6  C)] 98.8  F (37.1  C)  Pulse:  [107-136] 114  Resp:  [16-22] 18  BP: (106-142)/(65-89) 112/83  SpO2:  [94 %-99 %] 99 %

## 2021-08-25 NOTE — TELEPHONE ENCOUNTER
Called and verified that a week supply is what should be supplied to patient.  Review of chart shows that pt is in the hospital with COVID right now.  He has been approved by ID to continue anakinra in the hospital because they have taken him off high dose prednisone.    Sunshine Jc RN  [facility]

## 2021-08-25 NOTE — TELEPHONE ENCOUNTER
[facility] Call Center    Phone Message    May a detailed message be left on voicemail: yes     Reason for Call: Medication Question or concern regarding medication   Prescription Clarification  Name of Medication: Kineret   Prescribing Provider: Dr Pettit    Pharmacy: [facility] pharmacy    What on the order needs clarification? The pharmacy is calling to clarify quantity for the pt.     Action Taken: Message routed to:  [facility] ([facility]    Travel Screening: Not Applicable

## 2021-08-25 NOTE — PHARMACY-VANCOMYCIN DOSING SERVICE
"Pharmacy Vancomycin Initial Note  Date of Service 2021  Patient's  1955  66 year old, male    Indication: Community Acquired Pneumonia    Current estimated CrCl = Estimated Creatinine Clearance: 43.4 mL/min (A) (based on SCr of 1.83 mg/dL (H)).    Creatinine for last 3 days  2021:  9:18 AM Creatinine 1.99 mg/dL  2021:  6:13 AM Creatinine 1.91 mg/dL  2021:  5:42 AM Creatinine 1.83 mg/dL    Recent Vancomycin Level(s) for last 3 days  No results found for requested labs within last 72 hours.      Vancomycin IV Administrations (past 72 hours)      No vancomycin orders with administrations in past 72 hours.                Nephrotoxins and other renal medications (From now, onward)    Start     Dose/Rate Route Frequency Ordered Stop    21 1800  vancomycin 1250 mg in 0.9% NaCl 250 mL intermittent infusion 1,250 mg      1,250 mg  over 90 Minutes Intravenous EVERY 24 HOURS 21 17421 1800  piperacillin-tazobactam (ZOSYN) 4.5 g vial to attach to  mL bag     Note to Pharmacy: For [facility], [facility] and [facility]: For Zosyn-naive patients, use the \"Zosyn initial dose + extended infusion\" order panel.    4.5 g  over 30 Minutes Intravenous EVERY 6 HOURS 21 17421 1800  vancomycin (VANCOCIN) 1,750 mg in sodium chloride 0.9 % 500 mL intermittent infusion      1,750 mg  over 120 Minutes Intravenous ONCE 21 17421 1120  tacrolimus (GENERIC EQUIVALENT) capsule 4 mg      4 mg Oral 2 TIMES DAILY. 21 1119            Contrast Orders - past 72 hours (72h ago, onward)    None        Loading dose: 1750 mg at 18:00 2021.  Regimen: 1250 mg IV every 24 hours.  Start time: 17:47 on 2021  Exposure target: AUC24 (range)400-600 mg/L.hr   AUC24,ss: 540 mg/L.hr  Probability of AUC24 > 400: 81 %  Ctrough,ss: 17.1 mg/L  Probability of Ctrough,ss > 20: 35 %  Probability of nephrotoxicity (Lodise JAYLEEN 2009): 13 %          Plan:  1. Start vancomycin  1750 mg " IV once followed by 1250mg IV q24h.   2. Vancomycin monitoring method: AUC  3. Vancomycin therapeutic monitoring goal: 400-600 mg*h/L  4. Pharmacy will check vancomycin levels as appropriate in 3-5 Days.    5. Serum creatinine levels will be ordered daily for the first week of therapy and at least twice weekly for subsequent weeks.      Zheng Donaldson RPH

## 2021-08-25 NOTE — PLAN OF CARE
"Neuro: A&Ox4.   Cardiac: ST in the 100's-110's. No chest pain. BPs within parameters.   Respiratory: Sating greater than 92% on on RA when awake, home CPAP at night with filter in place per RT.   GI/: No BM overnight. Voiding using urinal.   Diet/appetite: Tolerating liquids overnight. No PO intake.   Activity:  Assist of 1, up to chair and in halls. Generalized weakness.   Pain: At acceptable level on current regimen. PRN tramadol given x 1 for generalized pains/gout pain.   Skin: No new deficits noted. Scattered bruising and right shin scab.   LDA's: PIV x 1.  Plan: Continue with POC. Notify primary team with changes.    /82 (BP Location: Right arm)   Pulse 104   Temp 97.8  F (36.6  C) (Axillary)   Resp 14   Ht 1.727 m (5' 8\")   Wt 85.7 kg (188 lb 15 oz)   SpO2 100%   BMI 28.73 kg/m        "

## 2021-08-25 NOTE — ED NOTES
[facility]   ED Nurse to Floor Handoff     Jim Willingham is a 66 year old male who speaks English and lives with a spouse,  in a home  They arrived in the ED by ambulance from home    ED Chief Complaint: Shortness of Breath    ED Dx;   Final diagnoses:   Fever in adult   Shortness of breath   Sinus tachycardia   Status post heart transplantation (H)         Needed?: No    Allergies:   Allergies   Allergen Reactions     Grass Shortness Of Breath     Ace Inhibitors Cough     Dust Mites Other (See Comments)     Asthma     Mold Other (See Comments)     Asthma     Penicillins Other (See Comments)     Unknown - childhood exposure    Tolerated Zosyn 9/18-9/20/2020     Sulfa Drugs Other (See Comments) and Unknown     Unknown childhood reaction   .  Past Medical Hx:   Past Medical History:   Diagnosis Date     Bariatric surgery status 2003     Benign essential hypertension 05/11/2017     Bilateral carpal tunnel syndrome 11/02/2020     Cardiomyopathy, unspecified (H) 05/08/2017     CKD (chronic kidney disease) stage 3, GFR 30-59 ml/min 05/11/2017     COPD (chronic obstructive pulmonary disease) (H) 11/02/2020     Depression 05/11/2017     Diabetes mellitus (H) 1995     Gouty arthropathy, chronic, without tophi 11/02/2020     H/O gastric bypass 05/11/2017     ICD (implantable cardioverter-defibrillator), biventricular, in situ 05/11/2017     LVAD (left ventricular assist device) present (H)      Major depression, recurrent, chronic (H) 11/02/2020     NICM (nonischemic cardiomyopathy) (H)/ EF 20% 05/11/2017    ECHO: LVEDd. 7.66 cm, Restrictive pattern , Severe mitral valve regurgitation     CECILIA (obstructive sleep apnea) 05/11/2017     Paroxysmal atrial fibrillation (H) 05/11/2017     Paroxysmal VT (H) 05/11/2017     Rotator cuff tear arthropathy of both shoulders 11/02/2020     Uncomplicated asthma      Vitamin B12 deficiency (non anemic) 05/11/2017      Baseline Mental  status: WDL  Current Mental Status changes: at basesline    Infection present or suspected this encounter: yes respiratory and COVID r/o and special precautions  Sepsis suspected: No  Isolation type: Special Precautions-COVID-19  Patient tested for COVID 19 prior to admission: YES     Activity level - Baseline/Home:  Independent  Activity Level - Current:   Stand with Assist    Bariatric equipment needed?: No    In the ED these meds were given:   Medications   sodium chloride (PF) 0.9% PF flush 3 mL (has no administration in time range)   sodium chloride (PF) 0.9% PF flush 3 mL (3 mLs Intracatheter Given 8/24/21 1228)   acetaminophen (TYLENOL) tablet 650 mg (650 mg Oral Given 8/24/21 0703)     Or   acetaminophen (TYLENOL) Suppository 650 mg ( Rectal See Alternative 8/24/21 0703)   ibuprofen (ADVIL/MOTRIN) tablet 600 mg (has no administration in time range)   Reason ACE/ARB/ARNI order not selected (has no administration in time range)   Reason beta blocker not prescribed (has no administration in time range)   tacrolimus (GENERIC EQUIVALENT) capsule 4 mg (4 mg Oral Given 8/24/21 1744)   aspirin EC tablet 81 mg (81 mg Oral Given 8/24/21 1159)   calcium carbonate 600 mg-vitamin D 400 units (CALTRATE) per tablet 1 tablet (1 tablet Oral Given 8/24/21 1744)   clotrimazole (MYCELEX) lozenge 1 lozenge (1 lozenge Buccal Given 8/24/21 1745)   pantoprazole (PROTONIX) EC tablet 40 mg (has no administration in time range)   remdesivir 100 mg in sodium chloride 0.9 % 250 mL intermittent infusion (has no administration in time range)     And   0.9% sodium chloride BOLUS (has no administration in time range)   glucose gel 15-30 g (has no administration in time range)     Or   dextrose 50 % injection 25-50 mL (has no administration in time range)     Or   glucagon injection 1 mg (has no administration in time range)   insulin glargine (LANTUS PEN) injection 15 Units (has no administration in time range)   insulin aspart (NovoLOG)  injection (RAPID ACTING) (1 Units Subcutaneous Not Given 8/24/21 1759)   insulin aspart (NovoLOG) injection (RAPID ACTING) (has no administration in time range)   amitriptyline (ELAVIL) tablet 25 mg (has no administration in time range)   buPROPion (WELLBUTRIN) tablet 75 mg (has no administration in time range)   gabapentin (NEURONTIN) capsule 300 mg (300 mg Oral Given 8/24/21 1744)   montelukast (SINGULAIR) tablet 10 mg (has no administration in time range)   traMADol (ULTRAM) half-tab 25-50 mg (has no administration in time range)   albuterol (PROAIR HFA/PROVENTIL HFA/VENTOLIN HFA) 108 (90 Base) MCG/ACT inhaler 2 puff (has no administration in time range)   artificial tears ophthalmic ointment (has no administration in time range)   polyethylene glycol (MIRALAX) Packet 17 g (has no administration in time range)   sennosides (SENOKOT) tablet 8.6 mg (has no administration in time range)   enoxaparin ANTICOAGULANT (LOVENOX) injection 40 mg (40 mg Subcutaneous Given 8/24/21 1743)   0.9% sodium chloride BOLUS (0 mL/kg × 93.6 kg Intravenous Stopped 8/24/21 0756)   azithromycin (ZITHROMAX) 500 mg in sodium chloride 0.9 % 250 mL intermittent infusion (0 mg Intravenous Stopped 8/24/21 0827)   ceFEPIme (MAXIPIME) 2 g vial to attach to  ml bag for ADULTS or 50 ml bag for PEDS (0 g Intravenous Stopped 8/24/21 0827)   albuterol (PROAIR HFA/PROVENTIL HFA/VENTOLIN HFA) 108 (90 Base) MCG/ACT inhaler 2 puff (2 puffs Inhalation Given 8/24/21 1040)   hydrocortisone sodium succinate PF (solu-CORTEF) injection 100 mg (100 mg Intravenous Given 8/24/21 1228)   remdesivir 200 mg in sodium chloride 0.9 % 250 mL intermittent infusion (0 mg Intravenous Stopped 8/24/21 1842)     Followed by   0.9% sodium chloride BOLUS (0 mLs Intravenous Stopped 8/24/21 1948)   casirivimab ([id number]) 600 mg, imdevimab ([id number]) 600 mg in sodium chloride 0.9 % 60 mL intermittent infusion ( Intravenous New Bag 8/24/21 1949)       Drips running?   Yes    Home pump  No    Current LDAs  Peripheral IV 07/23/21 Anterior;Right (Active)   Number of days: 32       Peripheral IV 08/24/21 Right;Anterior Upper forearm (Active)   Site Assessment WDL 08/24/21 1234   Line Status Saline locked 08/24/21 1234   Dressing Intervention New dressing  08/24/21 0715   Phlebitis Scale 0-->no symptoms 08/24/21 0715   Infiltration Scale 0 08/24/21 0715   Number of days: 0       Wound Back Surgical NA (Active)   Number of days: 41       Incision/Surgical Site 05/06/21 Anterior;Midline Sternum (Active)   Number of days: 110       Incision/Surgical Site 05/24/21 Anterior;Left  (Active)   Number of days: 92       Labs results:   Labs Ordered and Resulted from Time of ED Arrival Up to the Time of Departure from the ED   COMPREHENSIVE METABOLIC PANEL - Abnormal; Notable for the following components:       Result Value    Urea Nitrogen 32 (*)     Creatinine 1.91 (*)     Calcium 8.0 (*)     Glucose 192 (*)     Protein Total 6.2 (*)     Albumin 2.6 (*)     GFR Estimate 36 (*)     All other components within normal limits   COVID-19 VIRUS (CORONAVIRUS) BY PCR - Abnormal; Notable for the following components:    SARS CoV2 PCR Positive (*)     All other components within normal limits    Narrative:     Testing was performed using the Xpert Xpress SARS-CoV-2 Assay on the  Cepheid Gene-Xpert Instrument Systems. Additional information about  this Emergency Use Authorization (EUA) assay can be found via the Lab  Guide. This test should be ordered for the detection of SARS-CoV-2 in  individuals who meet SARS-CoV-2 clinical and/or epidemiological  criteria. Test performance is unknown in asymptomatic patients. This  test is for in vitro diagnostic use under the FDA EUA for  laboratories certified under CLIA to perform high complexity testing.  This test has not been FDA cleared or approved. A negative result  does not rule out the presence of PCR inhibitors in the specimen or  target RNA in  concentration below the limit of detection for the  assay. The possibility of a false negative should be considered if  the patient's recent exposure or clinical presentation suggests  COVID-19. This test was validated by the [facility]. This laboratory is certified under  the Clinical Laboratory Improvement Amendments of 1988 (CLIA-88) as  qualified to perform high complexity laboratory testing.     ISTAT GASES LACTATE VENOUS POCT - Abnormal; Notable for the following components:    O2 Sat, Venous POCT 60 (*)     All other components within normal limits   CBC WITH PLATELETS AND DIFFERENTIAL - Abnormal; Notable for the following components:    RBC Count 3.21 (*)     Hemoglobin 8.8 (*)     Hematocrit 28.9 (*)     MCHC 30.4 (*)     RDW 16.6 (*)     All other components within normal limits   DIFFERENTIAL - Abnormal; Notable for the following components:    Absolute Lymphocytes 0.4 (*)     Elliptocytes Slight (*)     All other components within normal limits   ROUTINE UA WITH MICROSCOPIC REFLEX TO CULTURE - Abnormal; Notable for the following components:    Protein Albumin Urine 50  (*)     Mucus Urine Present (*)     All other components within normal limits    Narrative:     Urine Culture not indicated   BLOOD GAS VENOUS WITH OXYHEMOGLOBIN - Abnormal; Notable for the following components:    Oxyhemoglobin Venous 60 (*)     All other components within normal limits   D DIMER QUANTITATIVE - Abnormal; Notable for the following components:    D-Dimer Quantitative 3.27 (*)     All other components within normal limits    Narrative:     This D-dimer assay is intended for use in conjunction with a clinical pretest probability assessment model to exclude pulmonary embolism (PE) and deep venous thrombosis (DVT) in outpatients suspected of PE or DVT. The cut-off value is 0.50 ug/mL FEU.   NT PROBNP INPATIENT - Abnormal; Notable for the following components:    N terminal Pro  BNP Inpatient 3,277 (*)     All other components within normal limits   GLUCOSE BY METER - Abnormal; Notable for the following components:    GLUCOSE BY METER POCT 403 (*)     All other components within normal limits   PROCALCITONIN - Normal   TROPONIN I - Normal   URIC ACID - Normal   BLOOD CULTURE - Normal   BLOOD CULTURE - Normal   CBC WITH PLATELETS & DIFFERENTIAL    Narrative:     The following orders were created for panel order CBC with platelets differential.  Procedure                               Abnormality         Status                     ---------                               -----------         ------                     CBC with platelets and d...[[id number]]  Abnormal            Final result               Manual Differential[[id number]]          Abnormal            Final result                 Please view results for these tests on the individual orders.   EXTRA RED TOP TUBE   EXTRA BLUE TOP TUBE   EBV DNA PCR QUANTITATIVE WHOLE BLOOD   GLUCOSE MONITOR NURSING POCT   GLUCOSE MONITOR NURSING POCT   GLUCOSE MONITOR NURSING POCT   ISTAT CG4 GASES LACTATE KP NURSING POCT   IP ASSIGN PROVIDER TEAM TO TREATMENT TEAM   PULSE OXIMETRY NURSING   CARDIAC CONTINUOUS MONITORING   STRICT INTAKE AND OUTPUT   PERIPHERAL IV CATHETER   VITAL SIGNS   PULSE OXIMETRY NURSING   MEASURE HEIGHT AND WEIGHT   STRICT INTAKE AND OUTPUT   ACTIVITY   TRANSPORT PATIENT   CARDIAC CONTINUOUS MONITORING   ACTIVITY   APPLY PNEUMATIC COMPRESSION DEVICE (PCD)   IP CARBOHYDRATE COUNTING BY NURSING   ASSESS FOR HYPOGLYCEMIA SYMPTOMS   NOTIFY PHYSICIAN   CMV QUANTITATIVE, PCR   EXTRA TUBE    Narrative:     The following orders were created for panel order Extra Tube.  Procedure                               Abnormality         Status                     ---------                               -----------         ------                     Extra Blue Top Tube[[id number]]                                                         Extra Red  Top Tube[[id number]]                               Final result                 Please view results for these tests on the individual orders.       Imaging Studies:   Recent Results (from the past 24 hour(s))   XR Chest Port 1 View    Narrative    EXAM: XR CHEST PORT 1 VIEW  LOCATION: [facility]  DATE/TIME: 8/24/2021 6:05 AM    INDICATION: cough, fever, SOB  COMPARISON: 07/18/2021. CTA chest 07/23/2021.      Impression    IMPRESSION: Small cavitary nodule of the medial right lower lobe on prior CTA chest not well seen by radiographic technique. Little change in scarring, pleural thickening, and small pleural effusion of the mid and lower right lung. Left lung largely   clear. Poststernotomy and coronary artery bypass grafting. 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 No visible pneumothorax.   CT Chest w/o Contrast    Narrative    CT CHEST W/O CONTRAST 8/24/2021 4:41 PM    History: Pneumonia, effusion or abscess suspected, x-ray done;  Respiratory illness, nondiagnostic x-ray    Comparison: Chest abdomen pelvis CT 7/23/2021, chest CT 7/13/2021,  same day chest radiograph    Technique: CT of the chest was obtained without intravenous contrast.  Axial, coronal, and sagittal reconstructions were obtained and  reviewed.    Findings:     Lungs: There is peripheral consolidative opacity in the left upper  lobe, which appears partially cavitating. Previous right lower lobe  cavitary lesion has resolved, with residual scarring. Similar extent  of right basilar atelectasis/scarring with traction bronchiectasis.  Bronchiectasis in the left lower lobe. Nodular-like thickening in the  lateral aspect of the pleura overlying the right lower lobe is stable.  Continued loculated right pleural effusion, with extension to the  anterior mediastinum. Pneumothorax component has resolved. Multiple  pulmonary nodules, measuring up to 5 mm in the left upper lobe, which  are new (series 6 image  "73). There is tree-in-bud nodularity in the  medial right upper lobe (series 6 image 81)  Airways: Small amount of debris within the trachea.  Vessels: Main pulmonary artery and aorta are normal in caliber.  Abandoned ICD lead.  Heart: Postsurgical changes of heart transplant. Heart size is normal  without pericardial effusion  Lymph nodes: No suspicious mediastinal or hilar lymphadenopathy.  Thyroid: Within normal limits.  Esophagus: Within normal limits    Upper abdomen: Surgical changes of gastric bypass. Otherwise  unremarkable.    Bones and soft tissues: No suspicious axillary lymphadenopathy or soft  tissue mass. No suspicious osseous lesion. Degenerative changes in the  spine.      Impression    Impression:   1. New peripheral consolidative opacity in the left upper lobe which  appears partially cavitating. There are also multiple new pulmonary  nodules measuring up to 5 mm. Differential includes pneumonia,  including atypical pneumonias with possible developing abscess  formation, organizing pneumonia, and septic pulmonary emboli. Previous  right lower lobe cavitary lesion appears resolved with residual  scarring.  2. Continued loculated right pleural effusion and anterior mediastinal  simple fluid collection. Pneumothorax component has resolved.  3. Stable bilateral lower lobe bronchiectasis and scarring.    I have personally reviewed the examination and initial interpretation  and I agree with the findings.    RODRIGUEZ GAY MD         SYSTEM ID:  [id number]       Recent vital signs:   /86   Pulse 112   Temp 98.8  F (37.1  C)   Resp 22   Ht 1.727 m (5' 8\")   Wt 85.1 kg (187 lb 11.2 oz)   SpO2 97%   BMI 28.54 kg/m      [place] Coma Scale Score: 15 (08/24/21 1046)       Cardiac Rhythm: Tachycardia  Pt needs tele? Yes  Skin/wound Issues: None    Code Status: Full Code    Pain control: good    Nausea control: good    Abnormal labs/tests/findings requiring intervention: see epic    Family present " during ED course? No   Family Comments/Social Situation comments: covid, wife stopped by to drop off stuff    Tasks needing completion: None    Zeynep Fontana, RN    [phone] [facility]

## 2021-08-26 ENCOUNTER — APPOINTMENT (OUTPATIENT)
Dept: OCCUPATIONAL THERAPY | Facility: CLINIC | Age: 66
DRG: 177 | End: 2021-08-26
Attending: NURSE PRACTITIONER
Payer: COMMERCIAL

## 2021-08-26 LAB
ANION GAP SERPL CALCULATED.3IONS-SCNC: 5 MMOL/L (ref 3–14)
BASOPHILS # BLD MANUAL: 0.1 10E3/UL (ref 0–0.2)
BASOPHILS NFR BLD MANUAL: 2 %
BUN SERPL-MCNC: 34 MG/DL (ref 7–30)
BURR CELLS BLD QL SMEAR: SLIGHT
CALCIUM SERPL-MCNC: 7.4 MG/DL (ref 8.5–10.1)
CHLORIDE BLD-SCNC: 107 MMOL/L (ref 94–109)
CO2 SERPL-SCNC: 23 MMOL/L (ref 20–32)
CREAT SERPL-MCNC: 2.01 MG/DL (ref 0.66–1.25)
DACRYOCYTES BLD QL SMEAR: SLIGHT
ELLIPTOCYTES BLD QL SMEAR: SLIGHT
EOSINOPHIL # BLD MANUAL: 0 10E3/UL (ref 0–0.7)
EOSINOPHIL NFR BLD MANUAL: 1 %
ERYTHROCYTE [DISTWIDTH] IN BLOOD BY AUTOMATED COUNT: 17 % (ref 10–15)
FRAGMENTS BLD QL SMEAR: SLIGHT
GFR SERPL CREATININE-BSD FRML MDRD: 34 ML/MIN/1.73M2
GLUCOSE BLD-MCNC: 118 MG/DL (ref 70–99)
GLUCOSE BLDC GLUCOMTR-MCNC: 105 MG/DL (ref 70–99)
GLUCOSE BLDC GLUCOMTR-MCNC: 125 MG/DL (ref 70–99)
GLUCOSE BLDC GLUCOMTR-MCNC: 172 MG/DL (ref 70–99)
GLUCOSE BLDC GLUCOMTR-MCNC: 206 MG/DL (ref 70–99)
GLUCOSE BLDC GLUCOMTR-MCNC: 248 MG/DL (ref 70–99)
HCT VFR BLD AUTO: 24.3 % (ref 40–53)
HGB BLD-MCNC: 7.3 G/DL (ref 13.3–17.7)
LYMPHOCYTES # BLD MANUAL: 0.7 10E3/UL (ref 0.8–5.3)
LYMPHOCYTES NFR BLD MANUAL: 15 %
MAGNESIUM SERPL-MCNC: 1.5 MG/DL (ref 1.6–2.3)
MCH RBC QN AUTO: 26.7 PG (ref 26.5–33)
MCHC RBC AUTO-ENTMCNC: 30 G/DL (ref 31.5–36.5)
MCV RBC AUTO: 89 FL (ref 78–100)
MONOCYTES # BLD MANUAL: 0.4 10E3/UL (ref 0–1.3)
MONOCYTES NFR BLD MANUAL: 9 %
MYCOBACTERIUM SPEC CULT: NORMAL
MYCOBACTERIUM SPEC CULT: NORMAL
NEUTROPHILS # BLD MANUAL: 3.3 10E3/UL (ref 1.6–8.3)
NEUTROPHILS NFR BLD MANUAL: 73 %
PLAT MORPH BLD: ABNORMAL
PLATELET # BLD AUTO: 233 10E3/UL (ref 150–450)
POTASSIUM BLD-SCNC: 3.6 MMOL/L (ref 3.4–5.3)
RBC # BLD AUTO: 2.73 10E6/UL (ref 4.4–5.9)
RBC MORPH BLD: ABNORMAL
SODIUM SERPL-SCNC: 135 MMOL/L (ref 133–144)
SPECIMEN SOURCE: NORMAL
WBC # BLD AUTO: 4.5 10E3/UL (ref 4–11)

## 2021-08-26 PROCEDURE — 36415 COLL VENOUS BLD VENIPUNCTURE: CPT | Performed by: NURSE PRACTITIONER

## 2021-08-26 PROCEDURE — 99233 SBSQ HOSP IP/OBS HIGH 50: CPT | Performed by: INTERNAL MEDICINE

## 2021-08-26 PROCEDURE — 80048 BASIC METABOLIC PNL TOTAL CA: CPT | Performed by: NURSE PRACTITIONER

## 2021-08-26 PROCEDURE — 250N000013 HC RX MED GY IP 250 OP 250 PS 637: Performed by: STUDENT IN AN ORGANIZED HEALTH CARE EDUCATION/TRAINING PROGRAM

## 2021-08-26 PROCEDURE — 97165 OT EVAL LOW COMPLEX 30 MIN: CPT | Mod: GO

## 2021-08-26 PROCEDURE — 250N000013 HC RX MED GY IP 250 OP 250 PS 637: Performed by: NURSE PRACTITIONER

## 2021-08-26 PROCEDURE — 83735 ASSAY OF MAGNESIUM: CPT | Performed by: NURSE PRACTITIONER

## 2021-08-26 PROCEDURE — 258N000003 HC RX IP 258 OP 636: Performed by: STUDENT IN AN ORGANIZED HEALTH CARE EDUCATION/TRAINING PROGRAM

## 2021-08-26 PROCEDURE — 94660 CPAP INITIATION&MGMT: CPT

## 2021-08-26 PROCEDURE — 250N000011 HC RX IP 250 OP 636: Performed by: NURSE PRACTITIONER

## 2021-08-26 PROCEDURE — 258N000003 HC RX IP 258 OP 636: Performed by: NURSE PRACTITIONER

## 2021-08-26 PROCEDURE — 250N000012 HC RX MED GY IP 250 OP 636 PS 637: Performed by: NURSE PRACTITIONER

## 2021-08-26 PROCEDURE — 250N000012 HC RX MED GY IP 250 OP 636 PS 637: Performed by: STUDENT IN AN ORGANIZED HEALTH CARE EDUCATION/TRAINING PROGRAM

## 2021-08-26 PROCEDURE — 250N000011 HC RX IP 250 OP 636: Performed by: INTERNAL MEDICINE

## 2021-08-26 PROCEDURE — 97110 THERAPEUTIC EXERCISES: CPT | Mod: GO

## 2021-08-26 PROCEDURE — 250N000011 HC RX IP 250 OP 636: Performed by: STUDENT IN AN ORGANIZED HEALTH CARE EDUCATION/TRAINING PROGRAM

## 2021-08-26 PROCEDURE — 250N000009 HC RX 250: Performed by: STUDENT IN AN ORGANIZED HEALTH CARE EDUCATION/TRAINING PROGRAM

## 2021-08-26 PROCEDURE — 999N000157 HC STATISTIC RCP TIME EA 10 MIN

## 2021-08-26 PROCEDURE — 87385 HISTOPLASMA CAPSUL AG IA: CPT | Performed by: INTERNAL MEDICINE

## 2021-08-26 PROCEDURE — 99233 SBSQ HOSP IP/OBS HIGH 50: CPT | Mod: GC | Performed by: INTERNAL MEDICINE

## 2021-08-26 PROCEDURE — 120N000005 HC R&B MS OVERFLOW UMMC

## 2021-08-26 PROCEDURE — 999N000128 HC STATISTIC PERIPHERAL IV START W/O US GUIDANCE

## 2021-08-26 PROCEDURE — 250N000009 HC RX 250: Performed by: NURSE PRACTITIONER

## 2021-08-26 PROCEDURE — 85027 COMPLETE CBC AUTOMATED: CPT | Performed by: NURSE PRACTITIONER

## 2021-08-26 RX ORDER — MAGNESIUM SULFATE HEPTAHYDRATE 40 MG/ML
2 INJECTION, SOLUTION INTRAVENOUS ONCE
Status: COMPLETED | OUTPATIENT
Start: 2021-08-26 | End: 2021-08-26

## 2021-08-26 RX ORDER — DIPHENHYDRAMINE HYDROCHLORIDE 50 MG/ML
50 INJECTION INTRAMUSCULAR; INTRAVENOUS
Status: DISCONTINUED | OUTPATIENT
Start: 2021-08-26 | End: 2021-09-02 | Stop reason: HOSPADM

## 2021-08-26 RX ORDER — PIPERACILLIN SODIUM, TAZOBACTAM SODIUM 3; .375 G/15ML; G/15ML
3.38 INJECTION, POWDER, LYOPHILIZED, FOR SOLUTION INTRAVENOUS EVERY 6 HOURS
Status: DISCONTINUED | OUTPATIENT
Start: 2021-08-26 | End: 2021-09-01

## 2021-08-26 RX ORDER — METHYLPREDNISOLONE SODIUM SUCCINATE 125 MG/2ML
125 INJECTION, POWDER, LYOPHILIZED, FOR SOLUTION INTRAMUSCULAR; INTRAVENOUS
Status: DISCONTINUED | OUTPATIENT
Start: 2021-08-26 | End: 2021-09-02 | Stop reason: HOSPADM

## 2021-08-26 RX ADMIN — ENOXAPARIN SODIUM 40 MG: 40 INJECTION SUBCUTANEOUS at 16:31

## 2021-08-26 RX ADMIN — BUPROPION HYDROCHLORIDE 75 MG: 75 TABLET, FILM COATED ORAL at 07:48

## 2021-08-26 RX ADMIN — CLOTRIMAZOLE 1 LOZENGE: 10 LOZENGE ORAL at 19:54

## 2021-08-26 RX ADMIN — CLOTRIMAZOLE 1 LOZENGE: 10 LOZENGE ORAL at 11:54

## 2021-08-26 RX ADMIN — FLUTICASONE FUROATE AND VILANTEROL TRIFENATATE 1 PUFF: 100; 25 POWDER RESPIRATORY (INHALATION) at 07:57

## 2021-08-26 RX ADMIN — IRON SUCROSE 200 MG: 20 INJECTION, SOLUTION INTRAVENOUS at 16:31

## 2021-08-26 RX ADMIN — TACROLIMUS 4 MG: 1 CAPSULE ORAL at 07:47

## 2021-08-26 RX ADMIN — MICAFUNGIN SODIUM 150 MG: 50 INJECTION, POWDER, LYOPHILIZED, FOR SOLUTION INTRAVENOUS at 22:13

## 2021-08-26 RX ADMIN — INSULIN ASPART 2 UNITS: 100 INJECTION, SOLUTION INTRAVENOUS; SUBCUTANEOUS at 18:08

## 2021-08-26 RX ADMIN — PIPERACILLIN AND TAZOBACTAM 3.38 G: 3; .375 INJECTION, POWDER, LYOPHILIZED, FOR SOLUTION INTRAVENOUS at 19:54

## 2021-08-26 RX ADMIN — PANTOPRAZOLE SODIUM 40 MG: 40 TABLET, DELAYED RELEASE ORAL at 07:48

## 2021-08-26 RX ADMIN — AMITRIPTYLINE HYDROCHLORIDE 25 MG: 25 TABLET, FILM COATED ORAL at 22:13

## 2021-08-26 RX ADMIN — UMECLIDINIUM 1 PUFF: 62.5 AEROSOL, POWDER ORAL at 07:57

## 2021-08-26 RX ADMIN — MONTELUKAST 10 MG: 10 TABLET, FILM COATED ORAL at 22:13

## 2021-08-26 RX ADMIN — GABAPENTIN 300 MG: 300 CAPSULE ORAL at 07:48

## 2021-08-26 RX ADMIN — MAGNESIUM SULFATE IN WATER 2 G: 40 INJECTION, SOLUTION INTRAVENOUS at 07:47

## 2021-08-26 RX ADMIN — Medication 1 MG: at 09:34

## 2021-08-26 RX ADMIN — GABAPENTIN 300 MG: 300 CAPSULE ORAL at 14:01

## 2021-08-26 RX ADMIN — GABAPENTIN 300 MG: 300 CAPSULE ORAL at 19:54

## 2021-08-26 RX ADMIN — ENOXAPARIN SODIUM 40 MG: 40 INJECTION SUBCUTANEOUS at 04:11

## 2021-08-26 RX ADMIN — PIPERACILLIN SODIUM AND TAZOBACTAM SODIUM 4.5 G: 4; .5 INJECTION, POWDER, LYOPHILIZED, FOR SOLUTION INTRAVENOUS at 02:12

## 2021-08-26 RX ADMIN — INSULIN GLARGINE 15 UNITS: 100 INJECTION, SOLUTION SUBCUTANEOUS at 22:14

## 2021-08-26 RX ADMIN — REMDESIVIR 100 MG: 100 INJECTION, POWDER, LYOPHILIZED, FOR SOLUTION INTRAVENOUS at 18:01

## 2021-08-26 RX ADMIN — PREDNISONE 5 MG: 5 TABLET ORAL at 07:48

## 2021-08-26 RX ADMIN — Medication 10 MG: at 22:13

## 2021-08-26 RX ADMIN — BUPROPION HYDROCHLORIDE 75 MG: 75 TABLET, FILM COATED ORAL at 19:54

## 2021-08-26 RX ADMIN — CALCIUM CARBONATE 600 MG (1,500 MG)-VITAMIN D3 400 UNIT TABLET 1 TABLET: at 07:48

## 2021-08-26 RX ADMIN — Medication 1 MG: at 20:21

## 2021-08-26 RX ADMIN — CLOTRIMAZOLE 1 LOZENGE: 10 LOZENGE ORAL at 07:48

## 2021-08-26 RX ADMIN — ANAKINRA 100 MG: 100 INJECTION, SOLUTION SUBCUTANEOUS at 18:01

## 2021-08-26 RX ADMIN — ACETAMINOPHEN 650 MG: 325 TABLET, FILM COATED ORAL at 04:32

## 2021-08-26 RX ADMIN — PIPERACILLIN SODIUM AND TAZOBACTAM SODIUM 4.5 G: 4; .5 INJECTION, POWDER, LYOPHILIZED, FOR SOLUTION INTRAVENOUS at 07:47

## 2021-08-26 RX ADMIN — TACROLIMUS 4 MG: 1 CAPSULE ORAL at 18:01

## 2021-08-26 RX ADMIN — CALCIUM CARBONATE 600 MG (1,500 MG)-VITAMIN D3 400 UNIT TABLET 1 TABLET: at 18:01

## 2021-08-26 RX ADMIN — SODIUM CHLORIDE 50 ML: 9 INJECTION, SOLUTION INTRAVENOUS at 18:02

## 2021-08-26 RX ADMIN — CLOTRIMAZOLE 1 LOZENGE: 10 LOZENGE ORAL at 16:31

## 2021-08-26 RX ADMIN — PIPERACILLIN AND TAZOBACTAM 3.38 G: 3; .375 INJECTION, POWDER, LYOPHILIZED, FOR SOLUTION INTRAVENOUS at 14:01

## 2021-08-26 RX ADMIN — ASPIRIN 81 MG: 81 TABLET, COATED ORAL at 07:48

## 2021-08-26 ASSESSMENT — ACTIVITIES OF DAILY LIVING (ADL)
ADLS_ACUITY_SCORE: 13
ADLS_ACUITY_SCORE: 14
ADLS_ACUITY_SCORE: 14
PREVIOUS_RESPONSIBILITIES: MEAL PREP;HOUSEKEEPING;FINANCES;MEDICATION MANAGEMENT
ADLS_ACUITY_SCORE: 13

## 2021-08-26 ASSESSMENT — MIFFLIN-ST. JEOR: SCORE: 1637.22

## 2021-08-26 NOTE — PROGRESS NOTES
[facility]    Transplant Infectious Diseases Inpatient Progress Note      Jim Willingham MRN# [id number]   YOB: 1955 Age: 66 year old   Date of Admission and time: 8/24/2021  5:09 AM             Recommendations:   1. Continue zosyn and micafungin.   2. Discontinue vancomycin.   3. Will follow on BD glucan, A galactomannan, and urine Histoplasma.   - if A galactomannan and Histoplasma Ag are undetected, please re-consult pulmonary for bronchoscopy with BAL.   - if BD glucan is positive, will likely also need bronchoscopy and BAL since its sensitivity is very low.   4. Please continue to avoid high dose steroids for gout/pseudogout therapy.   - May use anakinra and other non-steroidal agents for gout/pseudogout control.   5. Continue remdesivir for a total of 5 days for now.         Summary of Presentation:   Transplants:  5/6/2021 (Heart), Postoperative day:  112     This patient is a 66 year old male with NICMP s/p LVAD in 2017 and OHT in 5/2021. Received basiliximab for induction. Currently on TAC/MMF (held due to COVID-19)/prednisone at high dose for gout/pseudogout.   Now with COVID-19 infection.         Active Problems and Infectious Diseases Issues:   1. COVID-19 infection.   2. KALI consolidation/pneumonia.     It looks like the patient has two infectious processes; COVID-19 infection and KALI pneumonia.   The differential diagnosis for the KALI pneumonia in this immunocompromised patient is bacterial (P aeruginosa, other....), aspiration pneumonia (patient reported episodes of aspiration), fungal infection (high dose steroids for gout/pseudogout), and less likely mycobacterial infection.   It is interesting that the patient has similar findings in the RLL in 7/2021, I am wondering if this is due to aspiration.   ABx and antifungal were initiated 8/25/21.   Now that the MRSA screen is negative we can discontinue vancomycin.     To avoid exposure to active COVID-19, will  wait for the non-invasive workup tests; however if the noninvasive workup is not diagnostic, will need bronchoscopy with BAL.     The patient is on room air; he received one dose of monoclonal AB 8/24/2021 and currently on remdesivir.         Old Problems and Infectious Diseases Issues:   1. RUPINDER hominis BSI in 9/2020 treated with vancomycin for 6 weeks, presumed to be LVAD related.  2. MRSE in a hand joint in broth only deemed a contaminant on 7/8/2021 with inflamed joint due to acute pseudogout.   3. RLL pneumonia with exudative pleural effusion in 7/2021. The effusion was negative for fungal/bacterial/AFB cx. Treated with IV ABx then levaquin. Urine and serum Blasto and Histo Ag were negative, A galactomannan was negative for BD glucan was intermediate. No antifungal therapy was given with repeat CT 7/23/2021 showed improvement.    Other Infectious Disease issues include:  - QTc 420 as of 8/24/2021.   - PCP prophylaxis: bactrim and dapsone and most recently pentamidine on 7/11/2021 and 8/11/2021.   - Serostatus: CMV D+/R+, EBV D+/R+, HSV1+/2-, VZV +, Toxo D-/R-  - Immunization status: when the patient is more stable, he will need the prevnar then the pneumovax vaccines, also the shingrix vaccine and the COVID-19 vaccine but when he is not on such a high dose of prednisone.   - Gamma globulin status: 838 pre-transplant.         Attestation:  I interviewed the patient and obtained history from the patient, and by reviewing the patient's chart including outside records, microbiological data, and radiological data. All data are summarized in this notes.  Catherine Grier MD  [facility]  Contact information available via [facility] Paging/Directory    08/26/2021           Interim History:   Still with dry cough and left sided chest pain.  Today c/o bodyaches and muscle pain.   No fever or chills.   With generalized fatigue.           History of Present Illness:   Transplants:   5/6/2021 (Heart), Postoperative day:  112     This patient is a 66 year old male with NICMP s/p LVAD in 2017 then OHT in 5/2021.   Recently hospitalized in 6/2021 and 7/2021 with RLL pneumonia and effusion.   In 6/2021 he was admitted with cough, fever and shortness of breath and was treated with IV vancomycin and cefepime then zithromax was added and eventually the patient was discharged on levaquin. He was readmitted in 7/2021 with fatigue, repeat CT showing cavity in the RLL and right effusion. The effusion was an exudate with negative fungal, bacteria, and AFB. Urine and serum Blasto and Histo Ag were negative, A galactomannan was negative for BD glucan was intermediate. No antifungal therapy was given with repeat CT 7/23/2021 showed improvement.    He also suffered from pseudogout treated with high dose steroids.     The patient started to experience fever and shortness 2 days ago. He presented to ED 8/24/2021 with the same and was found with COVID-19. ID consulted.   In the ED he started to cough and experience diarrhea of softer stool.             Review of Systems:      As mentioned in the HPI otherwise negative by reviewing constitutional symptoms, central and peripheral neurological systems, respiratory system, cardiac system, GI system,  system, musculoskeletal, skin, allergy, and lymphatics.                Immunizations:     Immunization History   Administered Date(s) Administered     Influenza Quad, Recombinant, p-free (RIV4) 01/24/2020     Influenza, Quad, High Dose, Pf, 65yr + 09/26/2020     Pneumo Conj 13-V (2010&after) 02/23/2015     Tdap (Adacel,Boostrix) 11/02/2020            Allergies:     Allergies   Allergen Reactions     Grass Shortness Of Breath     Ace Inhibitors Cough     Dust Mites Other (See Comments)     Asthma     Mold Other (See Comments)     Asthma     Penicillins Other (See Comments)     Unknown - childhood exposure    Tolerated Zosyn 9/18-9/20/2020     Sulfa Drugs Other (See  Comments) and Unknown     Unknown childhood reaction             Medications:   Medications that Require Transfusion:     ACE/ARB/ARNI NOT PRESCRIBED       BETA BLOCKER NOT PRESCRIBED       Scheduled Medications:     remdesivir  100 mg Intravenous Q24H    And     sodium chloride 0.9%  50 mL Intravenous Q24H     amitriptyline  25 mg Oral At Bedtime     anakinra  100 mg Subcutaneous Daily     aspirin  81 mg Oral Daily     buPROPion  75 mg Oral BID     calcium carbonate 600 mg-vitamin D 400 units  1 tablet Oral BID w/meals     clotrimazole  1 lozenge Buccal 4x Daily     enoxaparin ANTICOAGULANT  0.5 mg/kg Subcutaneous Q12H     fluticasone-vilanterol  1 puff Inhalation Daily     gabapentin  300 mg Oral TID     insulin aspart  1-7 Units Subcutaneous TID AC     insulin aspart  1-5 Units Subcutaneous At Bedtime     insulin glargine  15 Units Subcutaneous At Bedtime     iron sucrose (VENOFER) intermittent infusion (200 mg)  200 mg Intravenous Q24H     melatonin  10 mg Oral At Bedtime     micafungin  150 mg Intravenous Q24H     montelukast  10 mg Oral At Bedtime     pantoprazole  40 mg Oral QAM AC     piperacillin-tazobactam  3.375 g Intravenous Q6H     predniSONE  5 mg Oral Daily     sodium chloride (PF)  3 mL Intracatheter Q8H     tacrolimus  4 mg Oral BID IS     umeclidinium  1 puff Inhalation Daily     vancomycin  1,250 mg Intravenous Q24H            Physical Exam:   Temp: 98.3  F (36.8  C) Temp src: Oral BP: 117/80 Pulse: 101   Resp: 20 SpO2: 96 % O2 Device: None (Room air)      Wt Readings from Last 4 Encounters:   08/26/21 87.3 kg (192 lb 6.4 oz)   08/05/21 93.6 kg (206 lb 4.8 oz)   07/18/21 89.4 kg (197 lb 3.2 oz)   07/05/21 88.7 kg (195 lb 9.6 oz)     Constitutional: awake, alert, cooperative, no apparent distress and appears at stated age, well nourished.   Neurologic: Patient is moving all extremities without focal deficit, no focal sensory loss.   Lungs: CTA bilaterally, no accessory muscle use, no dullness to  percussion and no abnormal tactile fremitus.   CVS: RRR, normal S1/S2, no murmur, PMI was not displaced.   Abdomen: non-tender, non-distended, no masses, no bruit, no shifting dullness, normal BS.   Skin: no induration, fluctuation or discharge, and no rash            Data:   No results found for: ACD4    Inflammatory Markers    Recent Labs   Lab Test 07/19/21  0630 07/18/21  0552 07/16/21  0614 07/09/21  0632 07/08/21  1746 07/08/21  1533 06/28/21  2208 05/27/21  0449 05/26/21  0705 10/27/20  1250 10/20/20  1305 10/13/20  1320 10/06/20  1320 09/30/20  1130 04/13/18  1437 05/26/17  0700   SED  --   --   --   --  72*  --   --   --   --  10 8 10 20 9 18 25*   CRP 13.0* 7.7 4.2 98.0*  --  87.0* 27.0* 140.0* 220.0* 3.5 11.0* 12.0* 5.7 0.6* 9.4*  --        Immune Globulin Studies     Recent Labs   Lab Test 12/21/20  1133      IGM 55          Metabolic Studies       Recent Labs   Lab Test 08/26/21  1153 08/26/21  0745 08/26/21  0539 08/26/21  0219 08/25/21  2259 08/25/21  1645 08/25/21  0542 08/24/21  0613 08/24/21  0559 08/23/21  0918 08/17/21  1015 08/09/21  0910 08/05/21  0931 08/05/21  0931 07/09/21  0632 07/08/21  1533 07/01/21  0644 06/30/21  0339 06/24/21  0905 06/21/21  0933 06/07/21  0807 05/05/21  0628 05/04/21  2313   NA  --   --  135  --   --   --  135 136  --  135 134 139  --  137   < > 134  --  130*  --  131* 140  --  135   POTASSIUM  --   --  3.6  --   --   --  4.0 4.1  --  4.0 4.5 3.7  --  4.3   < > 4.6  --  4.8  --  4.8 4.2  --  4.0   CHLORIDE  --   --  107  --   --   --  106 106  --  103 104 108  --  107   < > 104  --  101  --  97 105  --  101   CO2  --   --  23  --   --   --  24 26  --  27 25 25  --  24   < > 22  --  25  --  26 30  --  26   ANIONGAP  --   --  5  --   --   --  5 4  --  5 5 6  --  6   < > 7  --  4  --  8 5  --  7   BUN  --   --  34*  --   --   --  32* 32*  --  34* 51* 23  --  27   < > 37*  --  47*  --  46* 41*  --  50*   CR  --   --  2.01*  --   --   --  1.83* 1.91*  --   1.99* 2.28* 1.44*  --  1.45*   < > 2.37*  --  2.24*  --  2.31* 1.93*  --  1.60*   GFRESTIMATED  --   --  34*  --   --   --  38* 36*  --  34* 29* 51*  --  51*   < > 28*  --  30*  --  29* 36*  --  45*   * 105* 118* 172* 254* 185* 215* 192*  --  172* 149* 94   < > 85   < > 133*  --  157*  --  285* 128*  --  115*   A1C  --   --   --   --   --   --   --   --   --   --   --   --   --   --   --   --   --   --   --   --   --   --  5.1   QAMAR  --   --  7.4*  --   --   --  7.5* 8.0*  --  8.3* 8.1* 8.2*  --  8.2*   < > 7.9*  --  8.3*  --  8.6 8.2*  --  9.1   PHOS  --   --   --   --   --   --   --   --   --   --   --   --   --  2.4*  --   --   --  2.8  --  3.7 3.7   < > 3.8   MAG  --   --  1.5*  --   --   --  1.6 1.5*  --   --   --   --   --  1.8   < >  --    < > 2.2  --  1.9 1.7  --  2.3   LACT  --   --   --   --   --   --   --   --  0.8  --   --   --   --   --   --  1.0  --  0.7   < >  --   --    < >  --    CKT  --   --   --   --   --   --   --   --   --   --   --   --   --   --   --   --   --   --   --  31 49  --   --     < > = values in this interval not displayed.       Hepatic Studies    Recent Labs   Lab Test 08/24/21  0613 08/05/21 0931 07/27/21  1027 07/19/21  0630 07/18/21  0552 07/17/21  0626 07/13/21  2053 07/10/21  0624   BILITOTAL 0.6 0.5 0.4 0.3 0.3 0.4  --   --    ALKPHOS 122 281* 516* 537* 577* 573*  --   --    ALBUMIN 2.6* 2.9* 3.0* 2.2* 2.2* 2.4*  --   --    AST 10 12 41 118* 211* 162*  --   --    ALT 17 36 152* 233* 278* 273*  --   --    LDH  --   --   --   --   --   --  252* 220       Pancreatitis testing    Recent Labs   Lab Test 05/26/21  1340 05/04/21  2313 11/05/20  0907 08/05/20  0335 07/06/18  0856 06/08/17  0944   AMYLASE 49 93  --   --   --   --    LIPASE 25*  --   --  168  --   --    TRIG  --   --  91  --  123 124       Hematology Studies      Recent Labs   Lab Test 08/26/21  0539 08/25/21  0542 08/24/21  0613 08/23/21  0918 08/17/21  1015 08/09/21  0910 08/05/21  0931 08/02/21  0850  07/27/21  1027 07/09/21  0632 07/08/21  1746   WBC 4.5 7.0 5.1 5.1 6.1 4.1 4.2  --  4.6   < > 8.3   ANEU 3.3 5.7 4.0  --   --   --  2.6  --  2.6  --  7.7   ALYM 0.7* 0.6* 0.4*  --   --   --  1.2  --  1.5  --  0.3*   VIJAY 0.4 0.6 0.6  --   --   --  0.2  --  0.5  --  0.1   AEOS 0.0 0.0 0.0  --   --   --  0.1  --  0.1  --  0.0   HGB 7.3* 7.9* 8.8* 8.9* 9.0* 8.8* 9.2*   < > 8.6*   < > 8.9*   HCT 24.3* 26.5* 28.9* 29.7* 29.5* 29.1* 31.4*  --  29.2*   < > 28.9*    252 295 328 342 284 280  --  232   < > 281    < > = values in this interval not displayed.       Clotting Studies    Recent Labs   Lab Test 07/19/21  0630 07/18/21  0552 07/17/21  0626 07/16/21  0614 05/07/21  0340 05/06/21  0650 05/06/21  0426 05/04/21  2313 09/17/20  1554   INR 1.16* 1.11 1.12 1.07   < > 1.45* 2.02* 2.00* 3.68*   PTT  --   --   --   --   --  35 26 37 51*    < > = values in this interval not displayed.       Arterial Blood Gas Testing    Recent Labs   Lab Test 08/24/21  1142 08/24/21  0559 06/28/21  2247 05/09/21  0956 05/09/21  0902 05/07/21  1900 05/07/21  1700 05/07/21  1420 05/07/21  0340 05/06/21  2152   PH  --  7.40  --   --   --  7.40 7.38 7.39 7.38 7.37   PCO2  --   --   --   --   --  38 41 40 41 44   PO2  --   --   --   --   --  101 102 130* 153* 148*   HCO3  --   --   --   --   --  23 24 24 24 25   O2PER 99  --  21.0 21 21 REPORT AMENDED: 2LNC Canceled, Test credited  2L 40 40.0  40.0 40  40        Urine Studies     Recent Labs   Lab Test 08/24/21  0726 07/09/21  0640 06/30/21  0640 05/25/21  1230 05/16/21  0528   URINEPH 7.0 5.5 5.0 5.5 5.5   NITRITE Negative Negative Negative Negative Negative   LEUKEST Negative Negative Negative Negative Negative   WBCU 2 3 4 4 1       Vancomycin Levels     Recent Labs   Lab Test 05/08/21 2004 05/07/21  1701 10/27/20  1250 10/20/20  1305 10/13/20  1320 10/06/20  1320   VANCOMYCIN 18.5 16.0 19.3 16.5 13.4 16.1       Tobramycin levels     No lab results found.    Gentamicin levels    No  lab results found.    Tacrolimus levels    Invalid input(s): TACROLIMUS, TAC, TACR  Transplant Immunosuppression Labs Latest Ref Rng & Units 8/26/2021 8/25/2021 8/24/2021 8/23/2021 8/17/2021   Tacro Level 5.0 - 15.0 ug/L - 9.0 - 6.0 13.8   Tacro Level - - - - - -   Creat 0.66 - 1.25 mg/dL 2.01(H) 1.83(H) 1.91(H) 1.99(H) 2.28(H)   BUN 7 - 30 mg/dL 34(H) 32(H) 32(H) 34(H) 51(H)   WBC 4.0 - 11.0 10e3/uL 4.5 7.0 5.1 5.1 6.1   Neutrophil % 73 82 79 - -   ANEU 1.6 - 8.3 10e3/uL 3.3 5.7 4.0 - -       Microbiology:  Blood cx 8/24/2021 negative to date.   Last check of C difficile  No results found for: CDBPCT    Virology:  CMV viral loads    CMV Quant IU/mL   Date Value Ref Range Status   06/29/2021 CMV DNA Not Detected CMVND^CMV DNA Not Detected [IU]/mL Final     Comment:     Mutations within the highly conserved regions of the viral genome covered by   the KEENA AmpliPrep/KEENA TaqMan CMV Test primers and/or probes have been   identified and may result in under-quantitation of or failure to detect the   virus.  Supplemental testing methods should be used for testing when this is   suspected.  The KEENA AmpliPrep/KEENA TaqMan CMV Test is an FDA-approved in vitro nucleic   acid amplification test for the quantitation of cytomegalovirus DNA in human   plasma (EDTA plasma) using the KEENA AmpliPrep Instrument for automated viral   nucleic acid extraction and the [employer] TaqMan Analyzer or [employer] for   automated Real Time amplification and detection of the viral nucleic acid   target.  Titer results are reported in International Units/mL (IU/mL using 1st WHO   International standard for Human Cytomegalovirus for Nucleic Acid   Amplification based assays. The conversion factor between CMV DNA copis/mL (as   defined by the Roche KEENA TaqMan CMV test) and International Units is the   CMV DNA concentration in IU/mL x 1.1 copies/IU = CMV DNA in copies/mL.  This assay has received FDA approval for the testing of human plasma  only. The   Infectious Disease Diagnostic Laboratory at the [facility], [place], has validated the performance characteristics of the   Roche CMV assay for plasma, bronchial alveolar lavage/wash and urine.     06/28/2021 Canceled, Test credited CMVND^CMV DNA Not Detected [IU]/mL Final     Comment:     Quantity not sufficient  NOTIFIED VIA [employer] [facility] TRACK BOARD AT 2355 ON 6/28/21 ELK     06/07/2021 CMV DNA Not Detected CMVND^CMV DNA Not Detected [IU]/mL Final     Comment:     Mutations within the highly conserved regions of the viral genome covered by   the KEENA AmpliPrep/KEENA TaqMan CMV Test primers and/or probes have been   identified and may result in under-quantitation of or failure to detect the   virus.  Supplemental testing methods should be used for testing when this is   suspected.  The KEENA AmpliPrep/KEENA TaqMan CMV Test is an FDA-approved in vitro nucleic   acid amplification test for the quantitation of cytomegalovirus DNA in human   plasma (EDTA plasma) using the KEENA AmpliPrep Instrument for automated viral   nucleic acid extraction and the KEENA TaqMan Analyzer or [email or web address] TaqMan for   automated Real Time amplification and detection of the viral nucleic acid   target.  Titer results are reported in International Units/mL (IU/mL using 1st WHO   International standard for Human Cytomegalovirus for Nucleic Acid   Amplification based assays. The conversion factor between CMV DNA copis/mL (as   defined by the Roche KEENA TaqMan CMV test) and International Units is the   CMV DNA concentration in IU/mL x 1.1 copies/IU = CMV DNA in copies/mL.  This assay has received FDA approval for the testing of human plasma only. The   Infectious Disease Diagnostic Laboratory at the [facility], [place], has validated the performance characteristics of the   Roche CMV assay for plasma, bronchial alveolar lavage/wash and urine.       CMV DNA IU/mL   Date Value Ref  Range Status   08/24/2021 Not Detected Not Detected IU/mL Final   07/13/2021 Not Detected Not Detected IU/mL Final     CMV viral loads    Recent Labs   Lab Test 08/24/21  1143 07/13/21 2053 06/29/21  1412 06/28/21  2208 06/07/21  0807   CMVQNT Not Detected Not Detected CMV DNA Not Detected Canceled, Test credited CMV DNA Not Detected   CSPEC  --   --  Plasma Canceled, Test credited Plasma   CMVLOG  --   --  Not Calculated Canceled, Test credited Not Calculated       CMV viral loads    CMV Quant IU/mL   Date Value Ref Range Status   06/29/2021 CMV DNA Not Detected CMVND^CMV DNA Not Detected [IU]/mL Final     Comment:     Mutations within the highly conserved regions of the viral genome covered by   the KEENA AmpliPrep/KEENA TaqMan CMV Test primers and/or probes have been   identified and may result in under-quantitation of or failure to detect the   virus.  Supplemental testing methods should be used for testing when this is   suspected.  The KEENA AmpliPrep/KEENA TaqMan CMV Test is an FDA-approved in vitro nucleic   acid amplification test for the quantitation of cytomegalovirus DNA in human   plasma (EDTA plasma) using the KEENA AmpliPrep Instrument for automated viral   nucleic acid extraction and the KEENA TaqMan Analyzer or [employer] TaqMan for   automated Real Time amplification and detection of the viral nucleic acid   target.  Titer results are reported in International Units/mL (IU/mL using 1st WHO   International standard for Human Cytomegalovirus for Nucleic Acid   Amplification based assays. The conversion factor between CMV DNA copis/mL (as   defined by the Roche KEENA TaqMan CMV test) and International Units is the   CMV DNA concentration in IU/mL x 1.1 copies/IU = CMV DNA in copies/mL.  This assay has received FDA approval for the testing of human plasma only. The   Infectious Disease Diagnostic Laboratory at the [facility], [place], has validated the performance  characteristics of the   Roche CMV assay for plasma, bronchial alveolar lavage/wash and urine.     06/28/2021 Canceled, Test credited CMVND^CMV DNA Not Detected [IU]/mL Final     Comment:     Quantity not sufficient  NOTIFIED VIA ED TRACK BOARD AT 2355 ON 6/28/21 ELK     06/07/2021 CMV DNA Not Detected CMVND^CMV DNA Not Detected [IU]/mL Final     Comment:     Mutations within the highly conserved regions of the viral genome covered by   the KEENA AmpliPrep/KEENA TaqMan CMV Test primers and/or probes have been   identified and may result in under-quantitation of or failure to detect the   virus.  Supplemental testing methods should be used for testing when this is   suspected.  The KEENA AmpliPrep/KEENA TaqMan CMV Test is an FDA-approved in vitro nucleic   acid amplification test for the quantitation of cytomegalovirus DNA in human   plasma (EDTA plasma) using the KEENA AmpliPrep Instrument for automated viral   nucleic acid extraction and the [employer] TaqMan Analyzer or [employer] TaqMan for   automated Real Time amplification and detection of the viral nucleic acid   target.  Titer results are reported in International Units/mL (IU/mL using 1st WHO   International standard for Human Cytomegalovirus for Nucleic Acid   Amplification based assays. The conversion factor between CMV DNA copis/mL (as   defined by the Roche KEENA TaqMan CMV test) and International Units is the   CMV DNA concentration in IU/mL x 1.1 copies/IU = CMV DNA in copies/mL.  This assay has received FDA approval for the testing of human plasma only. The   Infectious Disease Diagnostic Laboratory at the [facility], [place], has validated the performance characteristics of the   Roche CMV assay for plasma, bronchial alveolar lavage/wash and urine.       CMV DNA IU/mL   Date Value Ref Range Status   08/24/2021 Not Detected Not Detected IU/mL Final   07/13/2021 Not Detected Not Detected IU/mL Final     Log IU/mL of CMVQNT   Date  Value Ref Range Status   06/29/2021 Not Calculated <2.1 [Log_IU]/mL Final   06/28/2021 Canceled, Test credited <2.1 [Log_IU]/mL Final     Comment:     Quantity not sufficient  NOTIFIED VIA ED TRACK BOARD AT 2355 ON 6/28/21 ELK     06/07/2021 Not Calculated <2.1 [Log_IU]/mL Final       CMV resistance testing  No lab results found.  No results found for: CMVCID, CMVFOS, CMVGAN     COVID-19 PCR Results    COVID-19 PCR Results 5/12/21 5/20/21 6/12/21 6/12/21 6/17/21 6/17/21 6/28/21 7/8/21 7/17/21 8/3/21 8/24/21 8/25/21      1056 1056 0902 0902         COVID-19 Virus PCR to [facility] - Result   Test received-See reflex to IDDL test SARS CoV2 (COVID-19) Virus RT-PCR  Test received-See reflex to IDDL test SARS CoV2 (COVID-19) Virus RT-PCR          COVID-19 Virus PCR to [facility] - Source   Nasopharyngeal  Nasopharyngeal          SARS-CoV-2 Virus Specimen Source Nasopharyngeal Nasopharyngeal  Nasopharyngeal  Nasopharyngeal Nasopharyngeal Nasopharyngeal       Flu A/B & SARS-COV-2 PCR Source               SARS-CoV-2 PCR Result NEGATIVE NEGATIVE  NEGATIVE  NEGATIVE NEGATIVE NEGATIVE       SARS CoV2 PCR         Negative Negative Positive (A) Positive (A)   (A) Abnormal value       Comments are available for some flowsheets but are not being displayed.             No results found for: H6RES    EBV DNA Copies/mL   Date Value Ref Range Status   08/24/2021 <500 (A) Not Detected copies/mL Final     Comment:     EBV DNA Detected below the reportable range of 500 copies/mL   08/05/2021   Final     Comment:     EBV DNA Detetected below the reportable range of 500 copies/mL   06/28/2021 EBV DNA Not Detected EBVNEG^EBV DNA Not Detected [Copies]/mL Final   06/07/2021 3,509 (A) EBVNEG^EBV DNA Not Detected [Copies]/mL Final       CMV Antibody IgG   Date Value Ref Range Status   05/04/2021 >8.0 (H) 0.0 - 0.8 AI Final     Comment:     Positive  Antibody index (AI) values reflect qualitative changes in antibody   concentration that cannot be  directly associated with clinical condition or   disease state.     09/10/2020 >8.0 (H) 0.0 - 0.8 AI Final     Comment:     Positive  Antibody index (AI) values reflect qualitative changes in antibody   concentration that cannot be directly associated with clinical condition or   disease state.         Toxoplasma Antibody IgG   Date Value Ref Range Status   09/10/2020 <3.0 0.0 - 7.1 IU/mL Final     Comment:     Negative- Absence of detectable Toxoplasma gondii IgG antibodies. A negative   result does not rule out acute infection.  The magnitude of the measured result is not indicative of the amount of   antibody present. The concentrations of anti-Toxoplasma gondii IgG in a given   specimen determined with assays from different manufacturers can vary due to   differences in assay methods and reagent specificity.           Imaging:  CT chest 8/24/21, reviewed by myself.   Impression:   1. New peripheral consolidative opacity in the left upper lobe which  appears partially cavitating. There are also multiple new pulmonary  nodules measuring up to 5 mm. Differential includes pneumonia,  including atypical pneumonias with possible developing abscess  formation, organizing pneumonia, and septic pulmonary emboli. Previous  right lower lobe cavitary lesion appears resolved with residual  scarring.  2. Continued loculated right pleural effusion and anterior mediastinal  simple fluid collection. Pneumothorax component has resolved.  3. Stable bilateral lower lobe bronchiectasis and scarring.    CXR 8/24/2021 reviewed by myself.   IMPRESSION: Small cavitary nodule of the medial right lower lobe on prior CTA chest not well seen by radiographic technique. Little change in scarring, pleural thickening, and small pleural effusion of the mid and lower right lung. Left lung largely   clear. Poststernotomy and coronary artery bypass grafting. 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 No visible pneumothorax.    CT c/a/p 7/23/2021  with contrast  IMPRESSION:   1. Decreased right lower lobe cavitary lesion, with nearly resolved  right basilar consolidative opacities. Stable to slightly increased  scattered infectious peribronchovascular nodular and tree-in-bud  opacities.  Continued attention on followup recommended.  2. Grossly similar anterior mediastinal fluid collection as above.  This is favored to represent postoperative seroma given minimal change  compared to noncontrast chest CT and lack of significant associated  inflammatory fat stranding. This was previously aspirated on  7/15/2021, recommend correlating with these results as well as  patient's clinical status.  3. Decreased small residual right hydropneumothorax.  4. Continued bibasilar subsegmental atelectasis and predominately  right basilar bronchiectasis.  5. Cholelithiasis. Mild pericholecystic fluid is nonspecific and can  be seen in the setting of volume overload. No gallbladder wall  thickening.        Catherine Grier MD  [facility]  Contact information available via [facility] Paging/Directory     08/26/2021

## 2021-08-26 NOTE — PHARMACY-TCU REVIEW OF H&P
-------------------CRITICAL LAB VALUE-------------------    Lab Value: Covid 19   Time of notification: 8:35 PM  MD notified: Dr. Mccarty  Patient status: Stable  Temp:  [97.8  F (36.6  C)-98.9  F (37.2  C)] 98.6  F (37  C)  Pulse:  [104-115] 105  Resp:  [14-24] 24  BP: (108-122)/(70-90) 115/79  FiO2 (%):  [30 %] 30 %  SpO2:  [96 %-100 %] 99 %  Orders received: No orders. Test was a re check. Patient already on precautions.

## 2021-08-26 NOTE — PROGRESS NOTES
08/26/21 1400   Quick Adds   Type of Visit Initial Occupational Therapy Evaluation   Living Environment   People in home grandchild(lidia);spouse   Current Living Arrangements house   Home Accessibility stairs to enter home;stairs within home   Number of Stairs, Main Entrance 1   Stair Railings, Main Entrance none   Number of Stairs, Within Home, Primary 8   Stair Railings, Within Home, Primary railing on right side (ascending)   Transportation Anticipated car, drives self;family or friend will provide   Living Environment Comments Pt lives in a split level home with his wife and 5 y/o grandchild. Spends most of his time on the upper level of the home.    Self-Care   Usual Activity Tolerance good   Current Activity Tolerance moderate   Activity/Exercise Type   ( PT/OT)   Exercise Amount/Frequency 2 times/wk   Equipment Currently Used at Home walker, rolling   Activity/Exercise/Self-Care Comment Pt reports he has a walker at home but only uses it sometimes, no other AE reported.    Instrumental Activities of Daily Living (IADL)   Previous Responsibilities meal prep;housekeeping;finances;medication management   IADL Comments Pt reports he has been getting assist with laundry from his spouse and grocery shopping, has been driving locally, does not drive on the freeway.    Disability/Function   Hearing Difficulty or Deaf no   Wear Glasses or Blind yes   Vision Management glasses   Concentrating, Remembering or Making Decisions Difficulty no   Difficulty Communicating no   Difficulty Eating/Swallowing no   Walking or Climbing Stairs Difficulty yes   Walking or Climbing Stairs ambulation difficulty, requires equipment   Mobility Management Sometimes uses walker at home, does not use often.    Dressing/Bathing Difficulty no   Toileting issues no   Doing Errands Independently Difficulty (such as shopping) yes   Errands Management Family assists with errand management.    Fall history within last six months yes   Number  of times patient has fallen within last six months 1   Change in Functional Status Since Onset of Current Illness/Injury yes   General Information   Onset of Illness/Injury or Date of Surgery 08/24/21   Referring Physician Shelia Means NP   Patient/Family Therapy Goal Statement (OT) return home    Additional Occupational Profile Info/Pertinent History of Current Problem Per chart: Mr. Jim Willingham is a 66yr old male with a history of NICM (s/p HM2 LVAD 6/2017), VT, AFib, CKD, DMII, COPD, now s/p OHT 5/6/21, who presented to the ER with shortness of breath and headache, found to have COVID PNA.    Existing Precautions/Restrictions no known precautions/restrictions   Left Upper Extremity (Weight-bearing Status) full weight-bearing (FWB)   Right Upper Extremity (Weight-bearing Status) full weight-bearing (FWB)   Left Lower Extremity (Weight-bearing Status) full weight-bearing (FWB)   Right Lower Extremity (Weight-bearing Status) full weight-bearing (FWB)   General Observations and Info Activity: ambulate    Cognitive Status Examination   Orientation Status orientation to person, place and time   Affect/Mental Status (Cognitive) WNL   Follows Commands WNL   Cognitive Status Comments Mildly lethargic, reports recent difficulty with word-finding and STM, would benefit from further cognitive testing.    Visual Perception   Visual Impairment/Limitations WNL   Sensory   Sensory Quick Adds No deficits were identified   Pain Assessment   Patient Currently in Pain Yes, see Vital Sign flowsheet   Integumentary/Edema   Integumentary/Edema no deficits were identifed   Posture   Posture not impaired   Range of Motion Comprehensive   General Range of Motion no range of motion deficits identified   Strength Comprehensive (MMT)   General Manual Muscle Testing (MMT) Assessment no strength deficits identified   Coordination   Upper Extremity Coordination No deficits were identified   Gross Motor Coordination No deficits identified    Fine Motor Coordination Not tested   Bed Mobility   Comment (Bed Mobility) SBA    Transfer Skill: Bed to Chair/Chair to Bed   Transfer Comments CGA   Sit-Stand Transfer   Sit/Stand Transfer Comments SBA    Balance   Balance Comments Mildly unsteady on feet with standing, no overt LOB noted.    Clinical Impression   Criteria for Skilled Therapeutic Interventions Met (OT) yes;skilled treatment is necessary   OT Diagnosis Decreased activity tolerance and functional strength.    OT Problem List-Impairments impacting ADL problems related to;activity tolerance impaired;balance;cognition;strength   Assessment of Occupational Performance 1-3 Performance Deficits   Identified Performance Deficits home management, community mobility, bathing    Planned Therapy Interventions (OT) IADL retraining;cognition;ADL retraining;strengthening;progressive activity/exercise;home program guidelines   Clinical Decision Making Complexity (OT) low complexity   Therapy Frequency (OT) 6x/week   Predicted Duration of Therapy 1 week   Risk & Benefits of therapy have been explained evaluation/treatment results reviewed;care plan/treatment goals reviewed;risks/benefits reviewed;current/potential barriers reviewed;participants voiced agreement with care plan;participants included;patient   OT Discharge Planning    OT Discharge Recommendation (DC Rec) Home with assist;home with home care occupational therapy   OT Rationale for DC Rec Pt presents below baseline level of function with limited activity tolerance, would benefit from  PT/OT to progress functional strength and IND with all mobility and ADLs/IADLs within home environment.    OT Brief overview of current status  SBA sit<>stands, CGA for weight shifting.    Total Evaluation Time (Minutes)   Total Evaluation Time (Minutes) 5

## 2021-08-26 NOTE — PROGRESS NOTES
CLINICAL NUTRITION SERVICES - BRIEF NOTE     New Findings:  Received consult for low sodium diet while in Emergency Department   -Patient is currently NPO for Bronchoscopy this AM     Diet Order: Regular (8/24)     Interventions  Spoke with provider regarding low sodium education- provider deemed unnessary at this time and discontinued consult.     RD to follow per protocol.    Karla Arias RD, LD  6B pager: [phone]

## 2021-08-26 NOTE — PLAN OF CARE
Neuro: A&Ox4. Able to make needs known. Follows commands.  Cardiac: Sinus rhythm/sinus tach. HR 90s-100s  Respiratory: Sating >92% on RA. Using BIPAP at HS.   GI/: Adequate urine output. No BM this shift.   Diet/appetite: NPO at midnight. Has bronch this AM.   Activity:  SBA with GB   Pain: Generalized aching. Received tylenol x1 with relief.   Skin: Bruising from a previous fall.   LDA's: PIV R forearm.     Plan: Continue with POC. Notify primary team with changes.

## 2021-08-26 NOTE — PLAN OF CARE
Neuro: A&Ox4. Lethargic earlier today, but alert this afternoon.  Cardiac: Afebrile. ST 100s. VSS.   Respiratory: Sating >92% on RA. RESENDIZ.  GI/: Adequate urine output. No BM today.   Diet/appetite: Tolerating regular diet. No appetite today.   Activity:  Assist of 1.  Pain: Given dilaudid x1 for pleuritic chest pain.   Skin: No new deficits noted.   LDA's:  -L PIV     Plan: IV iron given today. Continue with POC. Notify primary team with changes.

## 2021-08-26 NOTE — CONSULTS
Care Management Initial Consult    General Information  Assessment completed with: VM-chart review,         Primary Care Provider verified and updated as needed:     Readmission within the last 30 days:   No                Communication Assessment  Patient's communication style: spoken language (English or Bilingual)    Hearing Difficulty or Deaf: no   Wear Glasses or Blind: yes    Cognitive  Cognitive/Neuro/Behavioral: .WDL except, arousability  Level of Consciousness: lethargic  Arousal Level: arouses to voice  Orientation: oriented x 4  Mood/Behavior: calm, cooperative  Best Language: 0 - No aphasia  Speech: clear, spontaneous, logical    Living Environment:   People in home: grandchild(lidia), spouse   (Wife, Cate)  Current living Arrangements: house      Able to return to prior arrangements: yes       Family/Social Support:  Care provided by:  Wife assists as needed                   Description of Support System:    Involved and suportive       Current Resources:   Patient receiving home care services: [facility]                                                                       #[phone]    Skilled Nursing, Physicial Therapy, Occupational Therapy  Community Resources: Home Care ([facility])  Equipment currently used at home: walker, standard  Supplies currently used at home:  none    Employment/Financial:         Financial Concerns:   None identified          Lifestyle & Psychosocial Needs:  Social Determinants of Health     Tobacco Use: Medium Risk     Smoking Tobacco Use: Former Smoker     Smokeless Tobacco Use: Never Used   Alcohol Use:      Frequency of Alcohol Consumption:      Average Number of Drinks:      Frequency of Binge Drinking:    Financial Resource Strain:      Difficulty of Paying Living Expenses:    Food Insecurity:      Worried About Running Out of Food in the Last Year:      Ran Out of Food in the Last Year:    Transportation Needs:      Lack of Transportation  (Medical):      Lack of Transportation (Non-Medical):    Physical Activity:      Days of Exercise per Week:      Minutes of Exercise per Session:    Stress:      Feeling of Stress :    Social Connections:      Frequency of Communication with Friends and Family:      Frequency of Social Gatherings with Friends and Family:      Attends Latter day Services:      Active Member of Clubs or Organizations:      Attends Club or Organization Meetings:      Marital Status:    Intimate Partner Violence:      Fear of Current or Ex-Partner:      Emotionally Abused:      Physically Abused:      Sexually Abused:    Depression: Not at risk     PHQ-2 Score: 0   Housing Stability:      Unable to Pay for Housing in the Last Year:      Number of Places Lived in the Last Year:      Unstable Housing in the Last Year:        Functional Status:  Prior to admission patient needed assistance with homemaking                   Values/Beliefs:  Spiritual, Cultural Beliefs, Latter day Practices, Values that affect care:                 Additional Information:  Pt with history of Heart Transplant 5/21 admitted [facility]. Prior to admission Pt was living with his Wife in [place]. [facility] has been providing intermittent skilled Nsg, PT/OT visits. I have added resumption orders to the discharge orders.  CC will follow.    Adamaris Vides RN   6B care coordinator #[phone]

## 2021-08-26 NOTE — PROGRESS NOTES
Cardiology Progress Note      Faculty Attestation  Duran Mccarty M.D.    I personally saw and examined this patient, reviewed recent laboratories and imaging studies, discussed the case with the housestaff, and conveyed plan to the patient.  I answered all questions from patient and/or family. I agree with the examination, assessment and plan outlined here.  Assessment and care becoming more difficult as evidence of new cavitary lesions.      :    Assessment & Plan   Mr. Jim Willingham is a 66yr old male with a history of NICM (s/p HM2 LVAD 6/2017), VT, AFib, CKD, DMII, COPD, now s/p OHT 5/6/21, who presented to the ER with shortness of breath and headache, found to have COVID PNA.      Changes Today:  - Continue remdesivir  - Started on zosyn/vanco/micafungin yesterday  - Discussed Anakinra with Rheum, plan to start 100mg SubQ daily for 3 days  - IV Venofer for MICHAEL  - Deferring Bronch at this time per Pulmonary and ID     NICM, s/p OHT 5/6/21  His post-transplant course has been c/b right pleural air leak (required prolonged chest tube), pAFib, CAP (RLL, 6/2021), recurrent pleural effusions (s/p thoracenteses x2 6/2021 and 7/2021), RLL cavitary lesions (per chest CT 7/14/21, BD glucan indeterminate, aspergillus negative), pericardial effusion (1.4cm per TTE 7/12/21, conservatively managed), low-grade EBV viremia, and recurrent/persistent gout flare.     Rejection history:  none  AlloMap scores:  n/a  DSAs:  none  Coronary angio/Ischemic eval:  Deferred due to renal dysfunction  Last RHC:  8/5/21, mildly elevated biventricular filling pressures with RA 10, mPA 25, PCW 14, and CI 2.95.  Echo/cMRI:  TTE 8/5/21 showed stable graft function, with LVEF 60-65% and normal RV size/function, and trivial loculated pericardial effusion.     Serostatus:  - CMV D+/R+  - EBV D+/R+  - Toxo D-/R-     Immunosuppression:  - Continue baseline prednisone 5mg   - Holding MMF (PTA dose 1500mg twice daily)  - continue tacrolimus, goal  level 8-10 (tacro level 9.0 on 8/25).  Continue tacro 4mg twice daily (PTA dose)     PPx:  - CAV:  Aspirin 81mg daily.  Statin has been held due to elevated LFTs, defer restarting now.  - GI:  Pantoprazole 40mg daily  - Osteoporosis:  Calcium/vitamin D supplements  - CMV:  Completed 3m Valcyte therapy --> checking CMV levels today  - PCP:  Received pentamidine 8/11, due q28 days  - Thrush:  Clotrimazole troches QID -- started 8/5 to augment tacro levels  - Toxo:  n/a     Graft function:  - BPs:  Controlled  - HRs:  Tachycardic, 100s  - fluid status:  Euvolemic, not on diuretics (NT-ProBNP 3k)        COVID-19 PNA  Presented with dyspnea and fevers. Found to be covid positive (x2). D-Dimer 3.27. Currently on RA.   - Llovenox 0.5mg/kg subcutaneous q12 hours  - S/p COVID-19 monoclonal antibodies x1 --- ok'd and recommended per ID, discussed with pharmacy  - Continue remdesivir 100mg daily for a total of 5 days (day 3/5)     RLL pneumonia  Recurrent exudative pleural effusions (6/2021, 7/2021)  Cavitary Lung Lesion  The effusion was negative for fungal/bacterial/AFB cx.  Treated with IV ABx then levaquin.  Urine and serum Blasto and Histo Ag were negative, A galactomannan was negative, and BD glucan was intermediate. Repeat chest CT showed peripheral consolidative opacity in the left upper lobe which appears partially cavitating. Complaining of pleuritic chest pain on the side of the cavitating lesion.   - Continue zosyn/vanc/micafungin  - Transplant ID following  - Discussed with pulm, will defer bronchoscopy at this time given COVID + status  - Low dose PO hydromorphone only for severe breakthrough pain.     EBV viremia  EBV quant <500 this admission.     Gout/pseudogout  Predated transplant.  Has had recurrent flares following transplant, and continues to follow with rheumatology.  He notes that his current flare started in his fingers, and has now progressed up to his elbow.  The pain limits his activity.  He has  been treated with Anakinra, with positive results, but has not been approved for outpatient use by his insurance.  He recently completed a steroid burst per rheum (30mg x 4 days, 20mg x 4 days, 10mg x 4 days, then back to 5mg per transplant protocol), with little effect, so was advised to start a prednisone 40mg x 5 days today -- which he has not yet started.  He was given a one-time dose of hydrocortisone for adrenal insufficiency. He continues to complain of pain. Will discuss with Rheumatology  - Start Anakinra 100mg SubQ qday for 3 days   - If not improving, will formally consult Rheum tomorrow  - defer PTA allopurinol therapy given active flare     Iron deficiency anemia  Iron sat 8/5 was 13%.  He was started on po iron, but note that absorption is impaired while on po PPI.   - Start IV Venofer      WALTER on CKD  Creatinine had been stable at 1.4-1.6, but has been elevated to 1.9-2s in the recent weeks.  Likely ddx include tacro therapy and mild hypovolemia.  He received IVF in the ED.  He is not on diuretics.  Will closely monitor tacro levels.  - continue to trend BMP daily     Transaminitis, resolved  Auestionable Choledocholithiasis  Abdominal ultrasound 7/14/21 showing hepatomegaly with steatosis and no biliary dilatation.  LFTs stable on arrival today.  - trend LFTs     Severe protein calorie malnutrition  History of Sylvester En Y gastric bypass  Albumin on arrival 2.6.  - nutrition consult, appreciate rec's      DMII:  Starting lantus 15units at bedtime (PTA 18units), med sliding scale insulin, hypoglycemia protocol.  May need to consider endo consult pending BG trend.  Depression:  PTA wellbutrin 75mg BID  COPD:  PTA singulair, Trelegy Ellipta, Albuterol    Patient seen and discussed with Dr. Lul Robertson MD  Cardiology Fellow  [phone]    Interval History   Complaining of left sided chest pain, sharp, worse with certain movements (positional). Well controlled if he is just laying still. Has  "tried tramadol and tylenol which have not really helped. Otherwise just feels very fatigued.     Physical Exam   Temp: 98.1  F (36.7  C) Temp src: Oral BP: 120/80 Pulse: 105   Resp: 22 SpO2: 96 % O2 Device: None (Room air)    Vitals:    08/24/21 0515 08/25/21 0450 08/26/21 0500   Weight: 85.1 kg (187 lb 11.2 oz) 85.7 kg (188 lb 15 oz) 87.3 kg (192 lb 6.4 oz)     Vital Signs with Ranges  Temp:  [97.9  F (36.6  C)-99.4  F (37.4  C)] 98.1  F (36.7  C)  Pulse:  [] 105  Resp:  [16-24] 22  BP: (108-120)/(70-86) 120/80  SpO2:  [96 %-100 %] 96 %  I/O last 3 completed shifts:  In: 600 [P.O.:600]  Out: 800 [Urine:800]     , Blood pressure 120/80, pulse 105, temperature 98.1  F (36.7  C), resp. rate 22, height 1.727 m (5' 8\"), weight 87.3 kg (192 lb 6.4 oz), SpO2 96 %.  192 lbs 6.4 oz  GEN:  Alert, oriented x 3, appears comfortable, NAD.  CV:  Regular rate and rhythm, no murmur or JVD.  S1 + S2 noted, no S3 or S4.  LUNGS:  Breathing comfortably. Fine crackles bilaterally.   ABD:  Active bowel sounds, soft, non-tender/non-distended.  No rebound/guarding/rigidity.  EXT:  No edema or cyanosis.  Mild swelling of L 2/3 MTP and wrist without overlying erythema. Full ROM of left elbow.      Medications     ACE/ARB/ARNI NOT PRESCRIBED       BETA BLOCKER NOT PRESCRIBED         remdesivir  100 mg Intravenous Q24H    And     sodium chloride 0.9%  50 mL Intravenous Q24H     amitriptyline  25 mg Oral At Bedtime     aspirin  81 mg Oral Daily     buPROPion  75 mg Oral BID     calcium carbonate 600 mg-vitamin D 400 units  1 tablet Oral BID w/meals     clotrimazole  1 lozenge Buccal 4x Daily     enoxaparin ANTICOAGULANT  0.5 mg/kg Subcutaneous Q12H     fluticasone-vilanterol  1 puff Inhalation Daily     gabapentin  300 mg Oral TID     insulin aspart  1-7 Units Subcutaneous TID AC     insulin aspart  1-5 Units Subcutaneous At Bedtime     insulin glargine  15 Units Subcutaneous At Bedtime     magnesium sulfate  2 g Intravenous Once     " melatonin  10 mg Oral At Bedtime     micafungin  150 mg Intravenous Q24H     montelukast  10 mg Oral At Bedtime     pantoprazole  40 mg Oral QAM AC     piperacillin-tazobactam  4.5 g Intravenous Q6H     predniSONE  5 mg Oral Daily     sodium chloride (PF)  3 mL Intracatheter Q8H     tacrolimus  4 mg Oral BID IS     umeclidinium  1 puff Inhalation Daily     vancomycin  1,250 mg Intravenous Q24H       Data   Recent Labs   Lab 08/26/21  0745 08/26/21  0539 08/26/21  0219 08/25/21  0542 08/24/21  0613   WBC  --  4.5  --  7.0 5.1   HGB  --  7.3*  --  7.9* 8.8*   MCV  --  89  --  91 90   PLT  --  233  --  252 295   NA  --  135  --  135 136   POTASSIUM  --  3.6  --  4.0 4.1   CHLORIDE  --  107  --  106 106   CO2  --  23  --  24 26   BUN  --  34*  --  32* 32*   CR  --  2.01*  --  1.83* 1.91*   ANIONGAP  --  5  --  5 4   QAMAR  --  7.4*  --  7.5* 8.0*   * 118* 172* 215* 192*   ALBUMIN  --   --   --   --  2.6*   PROTTOTAL  --   --   --   --  6.2*   BILITOTAL  --   --   --   --  0.6   ALKPHOS  --   --   --   --  122   ALT  --   --   --   --  17   AST  --   --   --   --  10   TROPONIN  --   --   --   --  <0.015       No results found for this or any previous visit (from the past 24 hour(s)).

## 2021-08-27 LAB
1,3 BETA GLUCAN SER-MCNC: <31 PG/ML
ACANTHOCYTES BLD QL SMEAR: SLIGHT
ANION GAP SERPL CALCULATED.3IONS-SCNC: 7 MMOL/L (ref 3–14)
BASOPHILS # BLD MANUAL: 0.1 10E3/UL (ref 0–0.2)
BASOPHILS NFR BLD MANUAL: 2 %
BUN SERPL-MCNC: 30 MG/DL (ref 7–30)
BURR CELLS BLD QL SMEAR: SLIGHT
CALCIUM SERPL-MCNC: 7.9 MG/DL (ref 8.5–10.1)
CHLORIDE BLD-SCNC: 106 MMOL/L (ref 94–109)
CO2 SERPL-SCNC: 22 MMOL/L (ref 20–32)
CREAT SERPL-MCNC: 2.04 MG/DL (ref 0.66–1.25)
CRP SERPL-MCNC: 160 MG/L (ref 0–8)
EOSINOPHIL # BLD MANUAL: 0.1 10E3/UL (ref 0–0.7)
EOSINOPHIL NFR BLD MANUAL: 2 %
ERYTHROCYTE [DISTWIDTH] IN BLOOD BY AUTOMATED COUNT: 17 % (ref 10–15)
GALACTOMANNAN AG SERPL QL IA: NEGATIVE
GALACTOMANNAN AG SPEC IA-ACNC: 0.05
GFR SERPL CREATININE-BSD FRML MDRD: 33 ML/MIN/1.73M2
GLUCOSE BLD-MCNC: 134 MG/DL (ref 70–99)
GLUCOSE BLDC GLUCOMTR-MCNC: 129 MG/DL (ref 70–99)
GLUCOSE BLDC GLUCOMTR-MCNC: 147 MG/DL (ref 70–99)
GLUCOSE BLDC GLUCOMTR-MCNC: 160 MG/DL (ref 70–99)
GLUCOSE BLDC GLUCOMTR-MCNC: 175 MG/DL (ref 70–99)
GLUCOSE BLDC GLUCOMTR-MCNC: 69 MG/DL (ref 70–99)
GLUCOSE BLDC GLUCOMTR-MCNC: 81 MG/DL (ref 70–99)
HCT VFR BLD AUTO: 23.7 % (ref 40–53)
HGB BLD-MCNC: 7.1 G/DL (ref 13.3–17.7)
LYMPHOCYTES # BLD MANUAL: 0.7 10E3/UL (ref 0.8–5.3)
LYMPHOCYTES NFR BLD MANUAL: 27 %
MAGNESIUM SERPL-MCNC: 1.7 MG/DL (ref 1.6–2.3)
MCH RBC QN AUTO: 27.1 PG (ref 26.5–33)
MCHC RBC AUTO-ENTMCNC: 30 G/DL (ref 31.5–36.5)
MCV RBC AUTO: 91 FL (ref 78–100)
METAMYELOCYTES # BLD MANUAL: 0.1 10E3/UL
METAMYELOCYTES NFR BLD MANUAL: 4 %
MONOCYTES # BLD MANUAL: 0.4 10E3/UL (ref 0–1.3)
MONOCYTES NFR BLD MANUAL: 14 %
NEUTROPHILS # BLD MANUAL: 1.3 10E3/UL (ref 1.6–8.3)
NEUTROPHILS NFR BLD MANUAL: 51 %
OBSERVATION IMP: NEGATIVE
PLAT MORPH BLD: ABNORMAL
PLATELET # BLD AUTO: 226 10E3/UL (ref 150–450)
POTASSIUM BLD-SCNC: 3.7 MMOL/L (ref 3.4–5.3)
RBC # BLD AUTO: 2.62 10E6/UL (ref 4.4–5.9)
RBC MORPH BLD: ABNORMAL
SODIUM SERPL-SCNC: 135 MMOL/L (ref 133–144)
WBC # BLD AUTO: 2.6 10E3/UL (ref 4–11)

## 2021-08-27 PROCEDURE — 250N000012 HC RX MED GY IP 250 OP 636 PS 637: Performed by: STUDENT IN AN ORGANIZED HEALTH CARE EDUCATION/TRAINING PROGRAM

## 2021-08-27 PROCEDURE — 250N000012 HC RX MED GY IP 250 OP 636 PS 637: Performed by: NURSE PRACTITIONER

## 2021-08-27 PROCEDURE — 250N000011 HC RX IP 250 OP 636: Performed by: NURSE PRACTITIONER

## 2021-08-27 PROCEDURE — 250N000009 HC RX 250: Performed by: NURSE PRACTITIONER

## 2021-08-27 PROCEDURE — 258N000003 HC RX IP 258 OP 636: Performed by: NURSE PRACTITIONER

## 2021-08-27 PROCEDURE — 250N000013 HC RX MED GY IP 250 OP 250 PS 637: Performed by: STUDENT IN AN ORGANIZED HEALTH CARE EDUCATION/TRAINING PROGRAM

## 2021-08-27 PROCEDURE — 258N000003 HC RX IP 258 OP 636: Performed by: STUDENT IN AN ORGANIZED HEALTH CARE EDUCATION/TRAINING PROGRAM

## 2021-08-27 PROCEDURE — 83735 ASSAY OF MAGNESIUM: CPT | Performed by: STUDENT IN AN ORGANIZED HEALTH CARE EDUCATION/TRAINING PROGRAM

## 2021-08-27 PROCEDURE — 85027 COMPLETE CBC AUTOMATED: CPT | Performed by: NURSE PRACTITIONER

## 2021-08-27 PROCEDURE — 36415 COLL VENOUS BLD VENIPUNCTURE: CPT | Performed by: NURSE PRACTITIONER

## 2021-08-27 PROCEDURE — 250N000011 HC RX IP 250 OP 636: Performed by: INTERNAL MEDICINE

## 2021-08-27 PROCEDURE — 250N000013 HC RX MED GY IP 250 OP 250 PS 637: Performed by: NURSE PRACTITIONER

## 2021-08-27 PROCEDURE — 250N000009 HC RX 250: Performed by: STUDENT IN AN ORGANIZED HEALTH CARE EDUCATION/TRAINING PROGRAM

## 2021-08-27 PROCEDURE — 99233 SBSQ HOSP IP/OBS HIGH 50: CPT | Performed by: INTERNAL MEDICINE

## 2021-08-27 PROCEDURE — 250N000011 HC RX IP 250 OP 636: Performed by: STUDENT IN AN ORGANIZED HEALTH CARE EDUCATION/TRAINING PROGRAM

## 2021-08-27 PROCEDURE — 86140 C-REACTIVE PROTEIN: CPT | Performed by: STUDENT IN AN ORGANIZED HEALTH CARE EDUCATION/TRAINING PROGRAM

## 2021-08-27 PROCEDURE — 120N000005 HC R&B MS OVERFLOW UMMC

## 2021-08-27 PROCEDURE — 80048 BASIC METABOLIC PNL TOTAL CA: CPT | Performed by: NURSE PRACTITIONER

## 2021-08-27 PROCEDURE — 99233 SBSQ HOSP IP/OBS HIGH 50: CPT | Mod: GC | Performed by: INTERNAL MEDICINE

## 2021-08-27 RX ADMIN — GABAPENTIN 300 MG: 300 CAPSULE ORAL at 20:00

## 2021-08-27 RX ADMIN — GABAPENTIN 300 MG: 300 CAPSULE ORAL at 14:39

## 2021-08-27 RX ADMIN — PANTOPRAZOLE SODIUM 40 MG: 40 TABLET, DELAYED RELEASE ORAL at 09:19

## 2021-08-27 RX ADMIN — PIPERACILLIN AND TAZOBACTAM 3.38 G: 3; .375 INJECTION, POWDER, LYOPHILIZED, FOR SOLUTION INTRAVENOUS at 09:18

## 2021-08-27 RX ADMIN — PREDNISONE 5 MG: 5 TABLET ORAL at 09:19

## 2021-08-27 RX ADMIN — CLOTRIMAZOLE 1 LOZENGE: 10 LOZENGE ORAL at 11:59

## 2021-08-27 RX ADMIN — PIPERACILLIN AND TAZOBACTAM 3.38 G: 3; .375 INJECTION, POWDER, LYOPHILIZED, FOR SOLUTION INTRAVENOUS at 03:00

## 2021-08-27 RX ADMIN — BUPROPION HYDROCHLORIDE 75 MG: 75 TABLET, FILM COATED ORAL at 09:19

## 2021-08-27 RX ADMIN — AMITRIPTYLINE HYDROCHLORIDE 25 MG: 25 TABLET, FILM COATED ORAL at 21:15

## 2021-08-27 RX ADMIN — CLOTRIMAZOLE 1 LOZENGE: 10 LOZENGE ORAL at 16:54

## 2021-08-27 RX ADMIN — ASPIRIN 81 MG: 81 TABLET, COATED ORAL at 09:18

## 2021-08-27 RX ADMIN — TACROLIMUS 4 MG: 1 CAPSULE ORAL at 09:18

## 2021-08-27 RX ADMIN — Medication 10 MG: at 21:15

## 2021-08-27 RX ADMIN — ANAKINRA 100 MG: 100 INJECTION, SOLUTION SUBCUTANEOUS at 16:54

## 2021-08-27 RX ADMIN — ENOXAPARIN SODIUM 40 MG: 40 INJECTION SUBCUTANEOUS at 04:43

## 2021-08-27 RX ADMIN — CLOTRIMAZOLE 1 LOZENGE: 10 LOZENGE ORAL at 20:00

## 2021-08-27 RX ADMIN — REMDESIVIR 100 MG: 100 INJECTION, POWDER, LYOPHILIZED, FOR SOLUTION INTRAVENOUS at 18:12

## 2021-08-27 RX ADMIN — UMECLIDINIUM 1 PUFF: 62.5 AEROSOL, POWDER ORAL at 09:18

## 2021-08-27 RX ADMIN — BUPROPION HYDROCHLORIDE 75 MG: 75 TABLET, FILM COATED ORAL at 20:00

## 2021-08-27 RX ADMIN — TACROLIMUS 4 MG: 1 CAPSULE ORAL at 18:12

## 2021-08-27 RX ADMIN — PIPERACILLIN AND TAZOBACTAM 3.38 G: 3; .375 INJECTION, POWDER, LYOPHILIZED, FOR SOLUTION INTRAVENOUS at 14:40

## 2021-08-27 RX ADMIN — GABAPENTIN 300 MG: 300 CAPSULE ORAL at 09:19

## 2021-08-27 RX ADMIN — INSULIN GLARGINE 15 UNITS: 100 INJECTION, SOLUTION SUBCUTANEOUS at 21:23

## 2021-08-27 RX ADMIN — CALCIUM CARBONATE 600 MG (1,500 MG)-VITAMIN D3 400 UNIT TABLET 1 TABLET: at 18:12

## 2021-08-27 RX ADMIN — CLOTRIMAZOLE 1 LOZENGE: 10 LOZENGE ORAL at 09:19

## 2021-08-27 RX ADMIN — Medication 1 MG: at 09:40

## 2021-08-27 RX ADMIN — CALCIUM CARBONATE 600 MG (1,500 MG)-VITAMIN D3 400 UNIT TABLET 1 TABLET: at 09:19

## 2021-08-27 RX ADMIN — IRON SUCROSE 200 MG: 20 INJECTION, SOLUTION INTRAVENOUS at 16:55

## 2021-08-27 RX ADMIN — PIPERACILLIN AND TAZOBACTAM 3.38 G: 3; .375 INJECTION, POWDER, LYOPHILIZED, FOR SOLUTION INTRAVENOUS at 20:01

## 2021-08-27 RX ADMIN — MICAFUNGIN SODIUM 150 MG: 50 INJECTION, POWDER, LYOPHILIZED, FOR SOLUTION INTRAVENOUS at 21:15

## 2021-08-27 RX ADMIN — Medication 1 MG: at 21:38

## 2021-08-27 RX ADMIN — SODIUM CHLORIDE 50 ML: 9 INJECTION, SOLUTION INTRAVENOUS at 18:13

## 2021-08-27 RX ADMIN — ENOXAPARIN SODIUM 40 MG: 40 INJECTION SUBCUTANEOUS at 16:54

## 2021-08-27 RX ADMIN — MONTELUKAST 10 MG: 10 TABLET, FILM COATED ORAL at 21:15

## 2021-08-27 RX ADMIN — FLUTICASONE FUROATE AND VILANTEROL TRIFENATATE 1 PUFF: 100; 25 POWDER RESPIRATORY (INHALATION) at 09:18

## 2021-08-27 ASSESSMENT — ACTIVITIES OF DAILY LIVING (ADL)
ADLS_ACUITY_SCORE: 13

## 2021-08-27 ASSESSMENT — MIFFLIN-ST. JEOR: SCORE: 1644.5

## 2021-08-27 NOTE — PROGRESS NOTES
[facility]    Transplant Infectious Diseases Inpatient Progress Note      Jim Willingham MRN# [id number]   YOB: 1955 Age: 66 year old   Date of Admission and time: 8/24/2021  5:09 AM             Recommendations:   1. Continue zosyn and micafungin.   2. If A galactomannan is negative, please re-consult pulmonary for bronchoscopy with BAL.   3. Please continue to avoid high dose steroids for gout/pseudogout therapy.   4. Continue remdesivir for a total of 5 days for now.   5. Monitor CBC with diff.   If ANC <800 may give G-CSF 3 mcg/kg/day until ANC >=800.     Dr. Washington is available over the weekend for questions. Dr. Londono will assume the patient's care on Monday.         Summary of Presentation:   Transplants:  5/6/2021 (Heart), Postoperative day:  113     This patient is a 66 year old male with NICMP s/p LVAD in 2017 and OHT in 5/2021. Received basiliximab for induction. Currently on TAC/MMF (held due to COVID-19)/prednisone at high dose for gout/pseudogout.   Now with COVID-19 infection.         Active Problems and Infectious Diseases Issues:   1. COVID-19 infection.   2. KALI consolidation/pneumonia.     It looks like the patient has two infectious processes; COVID-19 infection and KALI pneumonia.   The differential diagnosis for the KALI pneumonia in this immunocompromised patient is bacterial (P aeruginosa, other....), aspiration pneumonia (patient reported episodes of aspiration), fungal infection (high dose steroids for gout/pseudogout), and less likely mycobacterial infection.   It is interesting that the patient has similar findings in the RLL in 7/2021, I am wondering if this is due to aspiration.   ABx and antifungal were initiated 8/25/21. The MRSA screen was negative so vancomycin was discontinued.     To avoid exposure to active COVID-19, will wait for the non-invasive workup tests; however if the noninvasive workup is not diagnostic, will need bronchoscopy  with BAL.     The patient is on room air; he received one dose of monoclonal AB 8/24/2021 and currently on remdesivir for total of 5 days.     3. Leukopenia.   Likely combination of drug-induced (zosyn, anakinra, etc...), sepsis and viral illness.   Continue to monitor and support with G-CSF 3 mcg/kg/day if ANC <800 and continue until ANC >=800.         Old Problems and Infectious Diseases Issues:   1. RUPINDER hominis BSI in 9/2020 treated with vancomycin for 6 weeks, presumed to be LVAD related.  2. MRSE in a hand joint in broth only deemed a contaminant on 7/8/2021 with inflamed joint due to acute pseudogout.   3. RLL pneumonia with exudative pleural effusion in 7/2021. The effusion was negative for fungal/bacterial/AFB cx. Treated with IV ABx then levaquin. Urine and serum Blasto and Histo Ag were negative, A galactomannan was negative for BD glucan was intermediate. No antifungal therapy was given with repeat CT 7/23/2021 showed improvement.    Other Infectious Disease issues include:  - QTc 420 as of 8/24/2021.   - PCP prophylaxis: bactrim and dapsone and most recently pentamidine on 7/11/2021 and 8/11/2021.   - Serostatus: CMV D+/R+, EBV D+/R+, HSV1+/2-, VZV +, Toxo D-/R-  - Immunization status: when the patient is more stable, he will need the prevnar then the pneumovax vaccines, also the shingrix vaccine and the COVID-19 vaccine but when he is not on such a high dose of prednisone.   - Gamma globulin status: 838 pre-transplant.         Attestation:  I interviewed the patient and obtained history from the patient, and by reviewing the patient's chart including outside records, microbiological data, and radiological data. All data are summarized in this notes.  Catherine Grier MD  [facility]  Contact information available via [facility] Paging/Directory    08/27/2021           Interim History:   Still with dry cough that is improving.   Still with pleuritic left sided chest  pain that is not improving.  Still with bodyaches and muscle pain that are improving.   No fever or chills.   With generalized fatigue.           History of Present Illness:   Transplants:  5/6/2021 (Heart), Postoperative day:  113     This patient is a 66 year old male with NICMP s/p LVAD in 2017 then OHT in 5/2021.   Recently hospitalized in 6/2021 and 7/2021 with RLL pneumonia and effusion.   In 6/2021 he was admitted with cough, fever and shortness of breath and was treated with IV vancomycin and cefepime then zithromax was added and eventually the patient was discharged on levaquin. He was readmitted in 7/2021 with fatigue, repeat CT showing cavity in the RLL and right effusion. The effusion was an exudate with negative fungal, bacteria, and AFB. Urine and serum Blasto and Histo Ag were negative, A galactomannan was negative for BD glucan was intermediate. No antifungal therapy was given with repeat CT 7/23/2021 showed improvement.    He also suffered from pseudogout treated with high dose steroids.     The patient started to experience fever and shortness 2 days ago. He presented to ED 8/24/2021 with the same and was found with COVID-19. ID consulted.   In the ED he started to cough and experience diarrhea of softer stool.             Review of Systems:      As mentioned in the HPI otherwise negative by reviewing constitutional symptoms, central and peripheral neurological systems, respiratory system, cardiac system, GI system,  system, musculoskeletal, skin, allergy, and lymphatics.                Immunizations:     Immunization History   Administered Date(s) Administered     Influenza Quad, Recombinant, p-free (RIV4) 01/24/2020     Influenza, Quad, High Dose, Pf, 65yr + 09/26/2020     Pneumo Conj 13-V (2010&after) 02/23/2015     Tdap (Adacel,Boostrix) 11/02/2020            Allergies:     Allergies   Allergen Reactions     Grass Shortness Of Breath     Ace Inhibitors Cough     Dust Mites Other (See  Comments)     Asthma     Mold Other (See Comments)     Asthma     Penicillins Other (See Comments)     Unknown - childhood exposure    Tolerated Zosyn 9/18-9/20/2020     Sulfa Drugs Other (See Comments) and Unknown     Unknown childhood reaction             Medications:   Medications that Require Transfusion:     ACE/ARB/ARNI NOT PRESCRIBED       BETA BLOCKER NOT PRESCRIBED       Scheduled Medications:     remdesivir  100 mg Intravenous Q24H    And     sodium chloride 0.9%  50 mL Intravenous Q24H     amitriptyline  25 mg Oral At Bedtime     anakinra  100 mg Subcutaneous Q24H     aspirin  81 mg Oral Daily     buPROPion  75 mg Oral BID     calcium carbonate 600 mg-vitamin D 400 units  1 tablet Oral BID w/meals     clotrimazole  1 lozenge Buccal 4x Daily     enoxaparin ANTICOAGULANT  0.5 mg/kg Subcutaneous Q12H     fluticasone-vilanterol  1 puff Inhalation Daily     gabapentin  300 mg Oral TID     insulin aspart  1-7 Units Subcutaneous TID AC     insulin aspart  1-5 Units Subcutaneous At Bedtime     insulin glargine  15 Units Subcutaneous At Bedtime     iron sucrose (VENOFER) intermittent infusion (200 mg)  200 mg Intravenous Q24H     melatonin  10 mg Oral At Bedtime     micafungin  150 mg Intravenous Q24H     montelukast  10 mg Oral At Bedtime     pantoprazole  40 mg Oral QAM AC     piperacillin-tazobactam  3.375 g Intravenous Q6H     predniSONE  5 mg Oral Daily     sodium chloride (PF)  3 mL Intracatheter Q8H     tacrolimus  4 mg Oral BID IS     umeclidinium  1 puff Inhalation Daily            Physical Exam:   Temp: 97.9  F (36.6  C) Temp src: Oral BP: 111/85 Pulse: 93   Resp: 18 SpO2: 94 % O2 Device: None (Room air)      Wt Readings from Last 4 Encounters:   08/27/21 88 kg (194 lb 0.1 oz)   08/05/21 93.6 kg (206 lb 4.8 oz)   07/18/21 89.4 kg (197 lb 3.2 oz)   07/05/21 88.7 kg (195 lb 9.6 oz)     Constitutional: awake, alert, cooperative, no apparent distress and appears at stated age, well nourished.   Neurologic:  Patient is moving all extremities without focal deficit, no focal sensory loss.   Lungs: CTA bilaterally, no accessory muscle use, no dullness to percussion and no abnormal tactile fremitus.   CVS: RRR, normal S1/S2, no murmur, PMI was not displaced.   Extremities: decrease swelling of the left hand.   Abdomen: non-tender, non-distended, no masses, no bruit, no shifting dullness, normal BS.   Skin: no induration, fluctuation or discharge, and no rash            Data:   No results found for: ACD4    Inflammatory Markers    Recent Labs   Lab Test 08/27/21  0550 07/19/21  0630 07/18/21  0552 07/16/21  0614 07/09/21  0632 07/08/21  1746 07/08/21  1533 06/28/21  2208 05/27/21  0449 10/27/20  1250 10/20/20  1305 10/13/20  1320 10/06/20  1320 09/30/20  1130 04/13/18  1437 05/26/17  0700   SED  --   --   --   --   --  72*  --   --   --  10 8 10 20 9 18 25*   .0* 13.0* 7.7 4.2 98.0*  --  87.0* 27.0* 140.0* 3.5 11.0* 12.0* 5.7 0.6* 9.4*  --        Immune Globulin Studies     Recent Labs   Lab Test 12/21/20  1133      IGM 55          Metabolic Studies       Recent Labs   Lab Test 08/27/21  0550 08/26/21  2219 08/26/21  1808 08/26/21  1153 08/26/21  0745 08/26/21  0539 08/25/21  0542 08/24/21  0613 08/24/21  0559 08/23/21  0918 08/17/21  1015 08/05/21  0931 08/05/21  0931 07/09/21  0632 07/08/21  1533 07/01/21  0644 06/30/21  0339 06/24/21  0905 06/21/21  0933 06/07/21  0807 05/05/21  0628 05/04/21  2313     --   --   --   --  135 135 136  --  135 134  --  137   < > 134  --  130*  --  131* 140  --  135   POTASSIUM 3.7  --   --   --   --  3.6 4.0 4.1  --  4.0 4.5  --  4.3   < > 4.6  --  4.8  --  4.8 4.2  --  4.0   CHLORIDE 106  --   --   --   --  107 106 106  --  103 104  --  107   < > 104  --  101  --  97 105  --  101   CO2 22  --   --   --   --  23 24 26  --  27 25  --  24   < > 22  --  25  --  26 30  --  26   ANIONGAP 7  --   --   --   --  5 5 4  --  5 5  --  6   < > 7  --  4  --  8 5  --  7   BUN  30  --   --   --   --  34* 32* 32*  --  34* 51*  --  27   < > 37*  --  47*  --  46* 41*  --  50*   CR 2.04*  --   --   --   --  2.01* 1.83* 1.91*  --  1.99* 2.28*  --  1.45*   < > 2.37*  --  2.24*  --  2.31* 1.93*  --  1.60*   GFRESTIMATED 33*  --   --   --   --  34* 38* 36*  --  34* 29*  --  51*   < > 28*  --  30*  --  29* 36*  --  45*   * 248* 206* 125* 105* 118* 215* 192*  --  172* 149*   < > 85   < > 133*  --  157*  --  285* 128*  --  115*   A1C  --   --   --   --   --   --   --   --   --   --   --   --   --   --   --   --   --   --   --   --   --  5.1   QAMAR 7.9*  --   --   --   --  7.4* 7.5* 8.0*  --  8.3* 8.1*  --  8.2*   < > 7.9*  --  8.3*  --  8.6 8.2*  --  9.1   PHOS  --   --   --   --   --   --   --   --   --   --   --   --  2.4*  --   --   --  2.8  --  3.7 3.7   < > 3.8   MAG 1.7  --   --   --   --  1.5* 1.6 1.5*  --   --   --   --  1.8   < >  --    < > 2.2  --  1.9 1.7  --  2.3   LACT  --   --   --   --   --   --   --   --  0.8  --   --   --   --   --  1.0  --  0.7   < >  --   --    < >  --    CKT  --   --   --   --   --   --   --   --   --   --   --   --   --   --   --   --   --   --  31 49  --   --     < > = values in this interval not displayed.       Hepatic Studies    Recent Labs   Lab Test 08/24/21  0613 08/05/21  0931 07/27/21  1027 07/19/21  0630 07/18/21  0552 07/17/21  0626 07/13/21  2053 07/10/21  0624   BILITOTAL 0.6 0.5 0.4 0.3 0.3 0.4  --   --    ALKPHOS 122 281* 516* 537* 577* 573*  --   --    ALBUMIN 2.6* 2.9* 3.0* 2.2* 2.2* 2.4*  --   --    AST 10 12 41 118* 211* 162*  --   --    ALT 17 36 152* 233* 278* 273*  --   --    LDH  --   --   --   --   --   --  252* 220       Pancreatitis testing    Recent Labs   Lab Test 05/26/21  1340 05/04/21  2313 11/05/20  0907 08/05/20  0335 07/06/18  0856 06/08/17  0944   AMYLASE 49 93  --   --   --   --    LIPASE 25*  --   --  168  --   --    TRIG  --   --  91  --  123 124       Hematology Studies      Recent Labs   Lab Test 08/27/21  0550  08/26/21  0539 08/25/21  0542 08/24/21  0613 08/23/21  0918 08/17/21  1015 08/05/21  1247 08/05/21  0931 08/02/21  0850 07/27/21  1027   WBC 2.6* 4.5 7.0 5.1 5.1 6.1   < > 4.2  --  4.6   ANEU 1.3* 3.3 5.7 4.0  --   --   --  2.6  --  2.6   ALYM 0.7* 0.7* 0.6* 0.4*  --   --   --  1.2  --  1.5   VIJAY 0.4 0.4 0.6 0.6  --   --   --  0.2  --  0.5   AEOS 0.1 0.0 0.0 0.0  --   --   --  0.1  --  0.1   HGB 7.1* 7.3* 7.9* 8.8* 8.9* 9.0*   < > 9.2*   < > 8.6*   HCT 23.7* 24.3* 26.5* 28.9* 29.7* 29.5*   < > 31.4*  --  29.2*    233 252 295 328 342   < > 280  --  232    < > = values in this interval not displayed.       Clotting Studies    Recent Labs   Lab Test 07/19/21  0630 07/18/21  0552 07/17/21  0626 07/16/21  0614 05/07/21  0340 05/06/21  0650 05/06/21  0426 05/04/21  2313 09/17/20  1554   INR 1.16* 1.11 1.12 1.07   < > 1.45* 2.02* 2.00* 3.68*   PTT  --   --   --   --   --  35 26 37 51*    < > = values in this interval not displayed.       Arterial Blood Gas Testing    Recent Labs   Lab Test 08/24/21  1142 08/24/21  0559 06/28/21  2247 05/09/21  0956 05/09/21  0902 05/07/21  1900 05/07/21  1700 05/07/21  1420 05/07/21 0340 05/06/21 2152   PH  --  7.40  --   --   --  7.40 7.38 7.39 7.38 7.37   PCO2  --   --   --   --   --  38 41 40 41 44   PO2  --   --   --   --   --  101 102 130* 153* 148*   HCO3  --   --   --   --   --  23 24 24 24 25   O2PER 99  --  21.0 21  21 REPORT AMENDED: 2LNC Canceled, Test credited  2L 40 40.0  40.0 40  40        Urine Studies     Recent Labs   Lab Test 08/24/21  0726 07/09/21  0640 06/30/21  0640 05/25/21  1230 05/16/21  0528   URINEPH 7.0 5.5 5.0 5.5 5.5   NITRITE Negative Negative Negative Negative Negative   LEUKEST Negative Negative Negative Negative Negative   WBCU 2 3 4 4 1       Vancomycin Levels     Recent Labs   Lab Test 05/08/21 2004 05/07/21  1701 10/27/20  1250 10/20/20  1305 10/13/20  1320 10/06/20  1320   VANCOMYCIN 18.5 16.0 19.3 16.5 13.4 16.1       Tobramycin levels      No lab results found.    Gentamicin levels    No lab results found.    Tacrolimus levels    Invalid input(s): TACROLIMUS, TAC, TACR  Transplant Immunosuppression Labs Latest Ref Rng & Units 8/27/2021 8/26/2021 8/25/2021 8/24/2021 8/23/2021   Tacro Level 5.0 - 15.0 ug/L - - 9.0 - 6.0   Tacro Level - - - - - -   Creat 0.66 - 1.25 mg/dL 2.04(H) 2.01(H) 1.83(H) 1.91(H) 1.99(H)   BUN 7 - 30 mg/dL 30 34(H) 32(H) 32(H) 34(H)   WBC 4.0 - 11.0 10e3/uL 2.6(L) 4.5 7.0 5.1 5.1   Neutrophil % 51 73 82 79 -   ANEU 1.6 - 8.3 10e3/uL 1.3(L) 3.3 5.7 4.0 -       Microbiology:  Blood cx 8/24/2021 negative to date.   Last check of C difficile  No results found for: CDBPCT    Virology:  CMV viral loads    CMV Quant IU/mL   Date Value Ref Range Status   06/29/2021 CMV DNA Not Detected CMVND^CMV DNA Not Detected [IU]/mL Final     Comment:     Mutations within the highly conserved regions of the viral genome covered by   the KEENA AmpliPrep/KEENA TaqMan CMV Test primers and/or probes have been   identified and may result in under-quantitation of or failure to detect the   virus.  Supplemental testing methods should be used for testing when this is   suspected.  The KEENA AmpliPrep/KEENA TaqMan CMV Test is an FDA-approved in vitro nucleic   acid amplification test for the quantitation of cytomegalovirus DNA in human   plasma (EDTA plasma) using the KEENA AmpliPrep Instrument for automated viral   nucleic acid extraction and the [employer] TaqMan Analyzer or [employer] for   automated Real Time amplification and detection of the viral nucleic acid   target.  Titer results are reported in International Units/mL (IU/mL using 1st WHO   International standard for Human Cytomegalovirus for Nucleic Acid   Amplification based assays. The conversion factor between CMV DNA copis/mL (as   defined by the Roche KEENA TaqMan CMV test) and International Units is the   CMV DNA concentration in IU/mL x 1.1 copies/IU = CMV DNA in copies/mL.  This assay has  received FDA approval for the testing of human plasma only. The   Infectious Disease Diagnostic Laboratory at the [facility], [place], has validated the performance characteristics of the   Roche CMV assay for plasma, bronchial alveolar lavage/wash and urine.     06/28/2021 Canceled, Test credited CMVND^CMV DNA Not Detected [IU]/mL Final     Comment:     Quantity not sufficient  NOTIFIED VIA [employer] TRACK BOARD AT 2355 ON 6/28/21 ELK     06/07/2021 CMV DNA Not Detected CMVND^CMV DNA Not Detected [IU]/mL Final     Comment:     Mutations within the highly conserved regions of the viral genome covered by   the KEENA AmpliPrep/KEENA TaqMan CMV Test primers and/or probes have been   identified and may result in under-quantitation of or failure to detect the   virus.  Supplemental testing methods should be used for testing when this is   suspected.  The KEENA AmpliPrep/KEENA TaqMan CMV Test is an FDA-approved in vitro nucleic   acid amplification test for the quantitation of cytomegalovirus DNA in human   plasma (EDTA plasma) using the KEENA AmpliPrep Instrument for automated viral   nucleic acid extraction and the KEENA TaqMan Analyzer or [employer] TaqMan for   automated Real Time amplification and detection of the viral nucleic acid   target.  Titer results are reported in International Units/mL (IU/mL using 1st WHO   International standard for Human Cytomegalovirus for Nucleic Acid   Amplification based assays. The conversion factor between CMV DNA copis/mL (as   defined by the Roche KEENA TaqMan CMV test) and International Units is the   CMV DNA concentration in IU/mL x 1.1 copies/IU = CMV DNA in copies/mL.  This assay has received FDA approval for the testing of human plasma only. The   Infectious Disease Diagnostic Laboratory at the [facility], [place], has validated the performance characteristics of the   Roche CMV assay for plasma, bronchial alveolar  lavage/wash and urine.       CMV DNA IU/mL   Date Value Ref Range Status   08/24/2021 Not Detected Not Detected IU/mL Final   07/13/2021 Not Detected Not Detected IU/mL Final     CMV viral loads    Recent Labs   Lab Test 08/24/21  1143 07/13/21  2053 06/29/21  1412 06/28/21  2208 06/07/21  0807   CMVQNT Not Detected Not Detected CMV DNA Not Detected Canceled, Test credited CMV DNA Not Detected   CSPEC  --   --  Plasma Canceled, Test credited Plasma   CMVLOG  --   --  Not Calculated Canceled, Test credited Not Calculated       CMV viral loads    CMV Quant IU/mL   Date Value Ref Range Status   06/29/2021 CMV DNA Not Detected CMVND^CMV DNA Not Detected [IU]/mL Final     Comment:     Mutations within the highly conserved regions of the viral genome covered by   the KEENA AmpliPrep/KEENA TaqMan CMV Test primers and/or probes have been   identified and may result in under-quantitation of or failure to detect the   virus.  Supplemental testing methods should be used for testing when this is   suspected.  The KEENA AmpliPrep/KEENA TaqMan CMV Test is an FDA-approved in vitro nucleic   acid amplification test for the quantitation of cytomegalovirus DNA in human   plasma (EDTA plasma) using the KEENA AmpliPrep Instrument for automated viral   nucleic acid extraction and the KEENA TaqMan Analyzer or [employer] TaqMan for   automated Real Time amplification and detection of the viral nucleic acid   target.  Titer results are reported in International Units/mL (IU/mL using 1st WHO   International standard for Human Cytomegalovirus for Nucleic Acid   Amplification based assays. The conversion factor between CMV DNA copis/mL (as   defined by the Roche KEENA TaqMan CMV test) and International Units is the   CMV DNA concentration in IU/mL x 1.1 copies/IU = CMV DNA in copies/mL.  This assay has received FDA approval for the testing of human plasma only. The   Infectious Disease Diagnostic Laboratory at the [facility]  [facility], has validated the performance characteristics of the   Roche CMV assay for plasma, bronchial alveolar lavage/wash and urine.     06/28/2021 Canceled, Test credited CMVND^CMV DNA Not Detected [IU]/mL Final     Comment:     Quantity not sufficient  NOTIFIED VIA ED TRACK BOARD AT 2355 ON 6/28/21 ELK     06/07/2021 CMV DNA Not Detected CMVND^CMV DNA Not Detected [IU]/mL Final     Comment:     Mutations within the highly conserved regions of the viral genome covered by   the KEENA AmpliPrep/KEENA TaqMan CMV Test primers and/or probes have been   identified and may result in under-quantitation of or failure to detect the   virus.  Supplemental testing methods should be used for testing when this is   suspected.  The KEENA AmpliPrep/KEENA TaqMan CMV Test is an FDA-approved in vitro nucleic   acid amplification test for the quantitation of cytomegalovirus DNA in human   plasma (EDTA plasma) using the KEENA AmpliPrep Instrument for automated viral   nucleic acid extraction and the [employer] TaqMan Analyzer or [employer] TaqMan for   automated Real Time amplification and detection of the viral nucleic acid   target.  Titer results are reported in International Units/mL (IU/mL using 1st WHO   International standard for Human Cytomegalovirus for Nucleic Acid   Amplification based assays. The conversion factor between CMV DNA copis/mL (as   defined by the Roche KEENA TaqMan CMV test) and International Units is the   CMV DNA concentration in IU/mL x 1.1 copies/IU = CMV DNA in copies/mL.  This assay has received FDA approval for the testing of human plasma only. The   Infectious Disease Diagnostic Laboratory at the [facility], [place], has validated the performance characteristics of the   Roche CMV assay for plasma, bronchial alveolar lavage/wash and urine.       CMV DNA IU/mL   Date Value Ref Range Status   08/24/2021 Not Detected Not Detected IU/mL Final   07/13/2021 Not Detected Not  Detected IU/mL Final     Log IU/mL of CMVQNT   Date Value Ref Range Status   06/29/2021 Not Calculated <2.1 [Log_IU]/mL Final   06/28/2021 Canceled, Test credited <2.1 [Log_IU]/mL Final     Comment:     Quantity not sufficient  NOTIFIED VIA ED TRACK BOARD AT 2355 ON 6/28/21 ELK     06/07/2021 Not Calculated <2.1 [Log_IU]/mL Final       CMV resistance testing  No lab results found.  No results found for: CMVCID, CMVFOS, CMVGAN     COVID-19 PCR Results    COVID-19 PCR Results 5/12/21 5/20/21 6/12/21 6/12/21 6/17/21 6/17/21 6/28/21 7/8/21 7/17/21 8/3/21 8/24/21 8/25/21      1056 1056 0902 0902         COVID-19 Virus PCR to [facility] - Result   Test received-See reflex to IDDL test SARS CoV2 (COVID-19) Virus RT-PCR  Test received-See reflex to IDDL test SARS CoV2 (COVID-19) Virus RT-PCR          COVID-19 Virus PCR to [facility] - Source   Nasopharyngeal  Nasopharyngeal          SARS-CoV-2 Virus Specimen Source Nasopharyngeal Nasopharyngeal  Nasopharyngeal  Nasopharyngeal Nasopharyngeal Nasopharyngeal       Flu A/B & SARS-COV-2 PCR Source               SARS-CoV-2 PCR Result NEGATIVE NEGATIVE  NEGATIVE  NEGATIVE NEGATIVE NEGATIVE       SARS CoV2 PCR         Negative Negative Positive (A) Positive (A)   (A) Abnormal value       Comments are available for some flowsheets but are not being displayed.             No results found for: H6RES    EBV DNA Copies/mL   Date Value Ref Range Status   08/24/2021 <500 (A) Not Detected copies/mL Final     Comment:     EBV DNA Detected below the reportable range of 500 copies/mL   08/05/2021   Final     Comment:     EBV DNA Detetected below the reportable range of 500 copies/mL   06/28/2021 EBV DNA Not Detected EBVNEG^EBV DNA Not Detected [Copies]/mL Final   06/07/2021 3,509 (A) EBVNEG^EBV DNA Not Detected [Copies]/mL Final       CMV Antibody IgG   Date Value Ref Range Status   05/04/2021 >8.0 (H) 0.0 - 0.8 AI Final     Comment:     Positive  Antibody index (AI) values reflect qualitative  changes in antibody   concentration that cannot be directly associated with clinical condition or   disease state.     09/10/2020 >8.0 (H) 0.0 - 0.8 AI Final     Comment:     Positive  Antibody index (AI) values reflect qualitative changes in antibody   concentration that cannot be directly associated with clinical condition or   disease state.         Toxoplasma Antibody IgG   Date Value Ref Range Status   09/10/2020 <3.0 0.0 - 7.1 IU/mL Final     Comment:     Negative- Absence of detectable Toxoplasma gondii IgG antibodies. A negative   result does not rule out acute infection.  The magnitude of the measured result is not indicative of the amount of   antibody present. The concentrations of anti-Toxoplasma gondii IgG in a given   specimen determined with assays from different manufacturers can vary due to   differences in assay methods and reagent specificity.           Imaging:  CT chest 8/24/21, reviewed by myself.   Impression:   1. New peripheral consolidative opacity in the left upper lobe which  appears partially cavitating. There are also multiple new pulmonary  nodules measuring up to 5 mm. Differential includes pneumonia,  including atypical pneumonias with possible developing abscess  formation, organizing pneumonia, and septic pulmonary emboli. Previous  right lower lobe cavitary lesion appears resolved with residual  scarring.  2. Continued loculated right pleural effusion and anterior mediastinal  simple fluid collection. Pneumothorax component has resolved.  3. Stable bilateral lower lobe bronchiectasis and scarring.    CXR 8/24/2021 reviewed by myself.   IMPRESSION: Small cavitary nodule of the medial right lower lobe on prior CTA chest not well seen by radiographic technique. Little change in scarring, pleural thickening, and small pleural effusion of the mid and lower right lung. Left lung largely   clear. Poststernotomy and coronary artery bypass grafting. No change in the abandoned pacemaker  lead. No visible pneumothorax.    CT c/a/p 7/23/2021 with contrast  IMPRESSION:   1. Decreased right lower lobe cavitary lesion, with nearly resolved  right basilar consolidative opacities. Stable to slightly increased  scattered infectious peribronchovascular nodular and tree-in-bud  opacities.  Continued attention on followup recommended.  2. Grossly similar anterior mediastinal fluid collection as above.  This is favored to represent postoperative seroma given minimal change  compared to noncontrast chest CT and lack of significant associated  inflammatory fat stranding. This was previously aspirated on  7/15/2021, recommend correlating with these results as well as  patient's clinical status.  3. Decreased small residual right hydropneumothorax.  4. Continued bibasilar subsegmental atelectasis and predominately  right basilar bronchiectasis.  5. Cholelithiasis. Mild pericholecystic fluid is nonspecific and can  be seen in the setting of volume overload. No gallbladder wall  thickening.        Catherine Grier MD  [facility]  Contact information available via [facility] Paging/Directory     08/27/2021

## 2021-08-27 NOTE — PLAN OF CARE
Neuro: A&Ox4. Calls appropriately, afebrile.   Cardiac: SR/ST, HR 's, VSS. Denies chest pain and SOB.  Respiratory: Sating >95% on RA. Lung sounds clear/diminished. CPAP at night.   GI/: Adequate urine output via urinal, BM X1.   Diet/appetite: Tolerating regular diet. Eating well. ACHS BG checks.   Activity:  Assist of SBA, up to bedside commode.   Pain: PRN Dilaudid given x1 for generalized body aches.   Skin: No new deficits noted.   LDA's: Right PIV x1.     Plan: Continue with POC. Notify primary team with changes.

## 2021-08-27 NOTE — PROGRESS NOTES
Cardiology Progress Note    I personally saw and examined this patient with the housestaff and agree with assessment and plan.  I personally coordinated care with pulmonary medicine and infectious disease to facilitate assessment of pulmonary infection status and optimal empiric adjustment of antibiotics, anti-fungal agents and immunosuppressive agents and conveyed our thoughts to the patient.  40 minutes    Assessment & Plan   Mr. Jim Willingham is a 66yr old male with a history of NICM (s/p HM2 LVAD 6/2017), VT, AFib, CKD, DMII, COPD, now s/p OHT 5/6/21, who presented to the ER with shortness of breath and headache, found to have COVID PNA.      Changes Today:  - New neutropenia, discussed with ID; continue to monitor  - Continue Remdesivir  - Continue zosyn/micafungin  - 1,3 Beta D Glucan and Galactomanan negative   - May need bronch, will discuss with pulm     NICM, s/p OHT 5/6/21  His post-transplant course has been c/b right pleural air leak (required prolonged chest tube), pAFib, CAP (RLL, 6/2021), recurrent pleural effusions (s/p thoracenteses x2 6/2021 and 7/2021), RLL cavitary lesions (per chest CT 7/14/21, BD glucan indeterminate, aspergillus negative), pericardial effusion (1.4cm per TTE 7/12/21, conservatively managed), low-grade EBV viremia, and recurrent/persistent gout flare.     Rejection history:  none  AlloMap scores:  n/a  DSAs:  none  Coronary angio/Ischemic eval:  Deferred due to renal dysfunction  Last RHC:  8/5/21, mildly elevated biventricular filling pressures with RA 10, mPA 25, PCW 14, and CI 2.95.  Echo/cMRI:  TTE 8/5/21 showed stable graft function, with LVEF 60-65% and normal RV size/function, and trivial loculated pericardial effusion.     Serostatus:  - CMV D+/R+  - EBV D+/R+  - Toxo D-/R-     Immunosuppression:  - Continue baseline prednisone 5mg   - Holding MMF given COVID (PTA dose 1500mg twice daily)  - continue tacrolimus, goal level 8-10 (tacro level 9.0 on 8/25).  Continue tacro  4mg twice daily (PTA dose)     PPx:  - CAV:  Aspirin 81mg daily.  Statin has been held due to elevated LFTs, defer restarting now.  - GI:  Pantoprazole 40mg daily  - Osteoporosis:  Calcium/vitamin D supplements  - CMV:  Completed 3m Valcyte therapy   - PCP:  Received pentamidine 8/11, due q28 days  - Thrush:  Clotrimazole troches QID -- started 8/5 to augment tacro levels  - Toxo:  n/a     Graft function:  - BPs:  Controlled  - HRs:  Tachycardic, 100s  - fluid status:  Euvolemic, not on diuretics (NT-ProBNP 3k)        COVID-19 PNA  Presented with dyspnea and fevers. Found to be covid positive (x2). D-Dimer 3.27. Currently on RA.   - Llovenox 0.5mg/kg subcutaneous q12 hours  - S/p COVID-19 monoclonal antibodies x1 --- ok'd and recommended per ID, discussed with pharmacy  - Continue remdesivir 100mg daily for a total of 5 days (day 4/5)     RLL pneumonia  Recurrent exudative pleural effusions (6/2021, 7/2021)  Cavitary Lung Lesion  The effusion was negative for fungal/bacterial/AFB cx.  Treated with IV ABx then levaquin.  Urine and serum Blasto and Histo Ag were negative, A galactomannan was negative, and BD glucan was intermediate. Repeat chest CT showed peripheral consolidative opacity in the left upper lobe which appears partially cavitating. Has associated pleuritic chest pain. 1,3 Beta D Glucan and Aspergillus galactomanan negative  - Continue zosyn/micafungin  - Transplant ID following  - May need bronch given negative fungal serologies, will discuss with ID/Pulm  - Low dose PO hydromorphone only for severe breakthrough pain.     EBV viremia  EBV quant <500 this admission.     Gout/pseudogout  Predated transplant.  Has had recurrent flares following transplant, and continues to follow with rheumatology.  He notes that his current flare started in his fingers, and has now progressed up to his elbow.  The pain limits his activity.  He has been treated with Anakinra, with positive results, but has not been approved  for outpatient use by his insurance.  He recently completed a steroid burst per rheum (30mg x 4 days, 20mg x 4 days, 10mg x 4 days, then back to 5mg per transplant protocol), with little effect, so was advised to start a prednisone 40mg x 5 days today -- which he has not yet started.  He was given a one-time dose of hydrocortisone for adrenal insufficiency. He continues to complain of pain.   - Start Anakinra 100mg SubQ qday for 3 days (Day 2/3)              - If not improving, will formally consult Rheum  - defer PTA allopurinol therapy given active flare     Iron deficiency anemia  Iron sat 8/5 was 13%.  He was started on po iron, but note that absorption is impaired while on po PPI.   - Start IV Venofer      WALTER on CKD  Creatinine had been stable at 1.4-1.6, but has been elevated to 1.9-2s in the recent weeks.  Likely ddx include tacro therapy and mild hypovolemia.  He received IVF in the ED.  He is not on diuretics.  Will closely monitor tacro levels. Stable around 2.   - continue to trend BMP daily     Transaminitis, resolved  Auestionable Choledocholithiasis  Abdominal ultrasound 7/14/21 showing hepatomegaly with steatosis and no biliary dilatation.  LFTs stable on admission.   - trend LFTs     Severe protein calorie malnutrition  History of Sylvester En Y gastric bypass  Albumin on arrival 2.6.  - nutrition consult, appreciate rec's      DMII:  Starting lantus 15units at bedtime (PTA 18units), med sliding scale insulin, hypoglycemia protocol.  May need to consider endo consult pending BG trend.  Depression:  PTA wellbutrin 75mg BID  COPD:  PTA singulair, Trelegy Ellipta, Albuterol     Patient seen and discussed with Dr. Lul Robertson MD  Cardiology Fellow  [phone]    Interval History   No events overnight. Feels about the same. Pain has improved slightly. Breathing is unchanged, no dyspnea reported.    Physical Exam   Temp: 97.9  F (36.6  C) Temp src: Oral BP: 111/85 Pulse: 93   Resp: 18 SpO2: 94  "% O2 Device: None (Room air)    Vitals:    08/25/21 0450 08/26/21 0500 08/27/21 0600   Weight: 85.7 kg (188 lb 15 oz) 87.3 kg (192 lb 6.4 oz) 88 kg (194 lb 0.1 oz)     Vital Signs with Ranges  Temp:  [97.8  F (36.6  C)-98.1  F (36.7  C)] 97.9  F (36.6  C)  Pulse:  [] 93  Resp:  [17-20] 18  BP: (111-135)/(80-90) 111/85  SpO2:  [94 %-98 %] 94 %  I/O last 3 completed shifts:  In: 2410 [P.O.:1700; I.V.:710]  Out: 1375 [Urine:1375]     , Blood pressure 111/85, pulse 93, temperature 97.9  F (36.6  C), temperature source Oral, resp. rate 18, height 1.727 m (5' 8\"), weight 88 kg (194 lb 0.1 oz), SpO2 94 %.  194 lbs .08 oz  GEN:  Alert, oriented x 3, appears comfortable, NAD.  CV:  Regular rate and rhythm, no murmur or JVD.  S1 + S2 noted, no S3 or S4.  LUNGS:  Breathing comfortably. No crackles appreciated.   ABD:  Active bowel sounds, soft, non-tender/non-distended.  No rebound/guarding/rigidity.  EXT:  No edema or cyanosis.  Mild swelling of L 2/3 MTP and wrist without overlying erythema (unchanged).    Medications     ACE/ARB/ARNI NOT PRESCRIBED       BETA BLOCKER NOT PRESCRIBED         remdesivir  100 mg Intravenous Q24H    And     sodium chloride 0.9%  50 mL Intravenous Q24H     amitriptyline  25 mg Oral At Bedtime     anakinra  100 mg Subcutaneous Q24H     aspirin  81 mg Oral Daily     buPROPion  75 mg Oral BID     calcium carbonate 600 mg-vitamin D 400 units  1 tablet Oral BID w/meals     clotrimazole  1 lozenge Buccal 4x Daily     enoxaparin ANTICOAGULANT  0.5 mg/kg Subcutaneous Q12H     fluticasone-vilanterol  1 puff Inhalation Daily     gabapentin  300 mg Oral TID     insulin aspart  1-7 Units Subcutaneous TID AC     insulin aspart  1-5 Units Subcutaneous At Bedtime     insulin glargine  15 Units Subcutaneous At Bedtime     iron sucrose (VENOFER) intermittent infusion (200 mg)  200 mg Intravenous Q24H     melatonin  10 mg Oral At Bedtime     micafungin  150 mg Intravenous Q24H     montelukast  10 mg Oral At " Bedtime     pantoprazole  40 mg Oral QAM AC     piperacillin-tazobactam  3.375 g Intravenous Q6H     predniSONE  5 mg Oral Daily     sodium chloride (PF)  3 mL Intracatheter Q8H     tacrolimus  4 mg Oral BID IS     umeclidinium  1 puff Inhalation Daily       Data   Recent Labs   Lab 08/27/21  0550 08/26/21  2219 08/26/21  1808 08/26/21  0539 08/25/21  0542 08/24/21  0613   WBC 2.6*  --   --  4.5 7.0 5.1   HGB 7.1*  --   --  7.3* 7.9* 8.8*   MCV 91  --   --  89 91 90     --   --  233 252 295     --   --  135 135 136   POTASSIUM 3.7  --   --  3.6 4.0 4.1   CHLORIDE 106  --   --  107 106 106   CO2 22  --   --  23 24 26   BUN 30  --   --  34* 32* 32*   CR 2.04*  --   --  2.01* 1.83* 1.91*   ANIONGAP 7  --   --  5 5 4   QAMAR 7.9*  --   --  7.4* 7.5* 8.0*   * 248* 206* 118* 215* 192*   ALBUMIN  --   --   --   --   --  2.6*   PROTTOTAL  --   --   --   --   --  6.2*   BILITOTAL  --   --   --   --   --  0.6   ALKPHOS  --   --   --   --   --  122   ALT  --   --   --   --   --  17   AST  --   --   --   --   --  10   TROPONIN  --   --   --   --   --  <0.015       No results found for this or any previous visit (from the past 24 hour(s)).

## 2021-08-27 NOTE — PLAN OF CARE
Neuro: A&Ox4. Able to make needs known. Follows commands.   Cardiac: SR/ST. HR 90s-110s  Respiratory: Sating >94% on RA. Using CPAP at HS.   GI/: Adequate urine output. LBM 8/25 per pt.   Diet/appetite: Regular diet.   Activity:  A of 1 with GB and walker.   Pain: At acceptable level on current regimen. PRN dilaudid given x1 for chest/pleuritic pain with some relief.   Skin: Generalized bruising.   LDA's: R forearm PIV. L arm PIV infiltrated. Cold compress applied. Swelling decreasing.     Plan: Continue with POC. Notify primary team with changes.

## 2021-08-28 ENCOUNTER — HEALTH MAINTENANCE LETTER (OUTPATIENT)
Age: 66
End: 2021-08-28

## 2021-08-28 ENCOUNTER — APPOINTMENT (OUTPATIENT)
Dept: OCCUPATIONAL THERAPY | Facility: CLINIC | Age: 66
DRG: 177 | End: 2021-08-28
Payer: COMMERCIAL

## 2021-08-28 LAB
ALBUMIN SERPL-MCNC: 1.9 G/DL (ref 3.4–5)
ALP SERPL-CCNC: 77 U/L (ref 40–150)
ALT SERPL W P-5'-P-CCNC: 10 U/L (ref 0–70)
ANION GAP SERPL CALCULATED.3IONS-SCNC: 2 MMOL/L (ref 3–14)
AST SERPL W P-5'-P-CCNC: 11 U/L (ref 0–45)
BASOPHILS # BLD AUTO: 0 10E3/UL (ref 0–0.2)
BASOPHILS NFR BLD AUTO: 1 %
BILIRUB DIRECT SERPL-MCNC: 0.1 MG/DL (ref 0–0.2)
BILIRUB SERPL-MCNC: 0.3 MG/DL (ref 0.2–1.3)
BUN SERPL-MCNC: 23 MG/DL (ref 7–30)
CALCIUM SERPL-MCNC: 7.7 MG/DL (ref 8.5–10.1)
CHLORIDE BLD-SCNC: 110 MMOL/L (ref 94–109)
CO2 SERPL-SCNC: 25 MMOL/L (ref 20–32)
CREAT SERPL-MCNC: 1.89 MG/DL (ref 0.66–1.25)
ELLIPTOCYTES BLD QL SMEAR: SLIGHT
EOSINOPHIL # BLD AUTO: 0.1 10E3/UL (ref 0–0.7)
EOSINOPHIL NFR BLD AUTO: 2 %
ERYTHROCYTE [DISTWIDTH] IN BLOOD BY AUTOMATED COUNT: 16.9 % (ref 10–15)
GFR SERPL CREATININE-BSD FRML MDRD: 37 ML/MIN/1.73M2
GLUCOSE BLD-MCNC: 64 MG/DL (ref 70–99)
GLUCOSE BLDC GLUCOMTR-MCNC: 105 MG/DL (ref 70–99)
GLUCOSE BLDC GLUCOMTR-MCNC: 112 MG/DL (ref 70–99)
GLUCOSE BLDC GLUCOMTR-MCNC: 136 MG/DL (ref 70–99)
GLUCOSE BLDC GLUCOMTR-MCNC: 143 MG/DL (ref 70–99)
GLUCOSE BLDC GLUCOMTR-MCNC: 47 MG/DL (ref 70–99)
GLUCOSE BLDC GLUCOMTR-MCNC: 92 MG/DL (ref 70–99)
GLUCOSE BLDC GLUCOMTR-MCNC: 93 MG/DL (ref 70–99)
HCT VFR BLD AUTO: 24.9 % (ref 40–53)
HGB BLD-MCNC: 7.5 G/DL (ref 13.3–17.7)
IMM GRANULOCYTES # BLD: 0.1 10E3/UL
IMM GRANULOCYTES NFR BLD: 2 %
LYMPHOCYTES # BLD AUTO: 0.8 10E3/UL (ref 0.8–5.3)
LYMPHOCYTES NFR BLD AUTO: 31 %
MAGNESIUM SERPL-MCNC: 1.7 MG/DL (ref 1.6–2.3)
MCH RBC QN AUTO: 26.5 PG (ref 26.5–33)
MCHC RBC AUTO-ENTMCNC: 30.1 G/DL (ref 31.5–36.5)
MCV RBC AUTO: 88 FL (ref 78–100)
MONOCYTES # BLD AUTO: 0.7 10E3/UL (ref 0–1.3)
MONOCYTES NFR BLD AUTO: 27 %
NEUTROPHILS # BLD AUTO: 1 10E3/UL (ref 1.6–8.3)
NEUTROPHILS NFR BLD AUTO: 37 %
NRBC # BLD AUTO: 0 10E3/UL
NRBC BLD AUTO-RTO: 0 /100
PLAT MORPH BLD: ABNORMAL
PLATELET # BLD AUTO: 222 10E3/UL (ref 150–450)
POTASSIUM BLD-SCNC: 3.4 MMOL/L (ref 3.4–5.3)
PROT SERPL-MCNC: 5.4 G/DL (ref 6.8–8.8)
RBC # BLD AUTO: 2.83 10E6/UL (ref 4.4–5.9)
RBC MORPH BLD: ABNORMAL
SODIUM SERPL-SCNC: 137 MMOL/L (ref 133–144)
VARIANT LYMPHS BLD QL SMEAR: PRESENT
WBC # BLD AUTO: 2.6 10E3/UL (ref 4–11)

## 2021-08-28 PROCEDURE — 250N000009 HC RX 250: Performed by: NURSE PRACTITIONER

## 2021-08-28 PROCEDURE — 250N000013 HC RX MED GY IP 250 OP 250 PS 637: Performed by: STUDENT IN AN ORGANIZED HEALTH CARE EDUCATION/TRAINING PROGRAM

## 2021-08-28 PROCEDURE — 97535 SELF CARE MNGMENT TRAINING: CPT | Mod: GO

## 2021-08-28 PROCEDURE — 80048 BASIC METABOLIC PNL TOTAL CA: CPT | Performed by: NURSE PRACTITIONER

## 2021-08-28 PROCEDURE — 99233 SBSQ HOSP IP/OBS HIGH 50: CPT | Mod: 24 | Performed by: INTERNAL MEDICINE

## 2021-08-28 PROCEDURE — 36415 COLL VENOUS BLD VENIPUNCTURE: CPT | Performed by: NURSE PRACTITIONER

## 2021-08-28 PROCEDURE — 83735 ASSAY OF MAGNESIUM: CPT | Performed by: NURSE PRACTITIONER

## 2021-08-28 PROCEDURE — 85025 COMPLETE CBC W/AUTO DIFF WBC: CPT | Performed by: NURSE PRACTITIONER

## 2021-08-28 PROCEDURE — 250N000011 HC RX IP 250 OP 636: Performed by: INTERNAL MEDICINE

## 2021-08-28 PROCEDURE — 258N000003 HC RX IP 258 OP 636: Performed by: NURSE PRACTITIONER

## 2021-08-28 PROCEDURE — 94660 CPAP INITIATION&MGMT: CPT

## 2021-08-28 PROCEDURE — 258N000003 HC RX IP 258 OP 636: Performed by: STUDENT IN AN ORGANIZED HEALTH CARE EDUCATION/TRAINING PROGRAM

## 2021-08-28 PROCEDURE — 99222 1ST HOSP IP/OBS MODERATE 55: CPT | Mod: GC | Performed by: INTERNAL MEDICINE

## 2021-08-28 PROCEDURE — 999N000157 HC STATISTIC RCP TIME EA 10 MIN

## 2021-08-28 PROCEDURE — 250N000012 HC RX MED GY IP 250 OP 636 PS 637: Performed by: NURSE PRACTITIONER

## 2021-08-28 PROCEDURE — 250N000012 HC RX MED GY IP 250 OP 636 PS 637: Performed by: STUDENT IN AN ORGANIZED HEALTH CARE EDUCATION/TRAINING PROGRAM

## 2021-08-28 PROCEDURE — 250N000013 HC RX MED GY IP 250 OP 250 PS 637: Performed by: NURSE PRACTITIONER

## 2021-08-28 PROCEDURE — 82248 BILIRUBIN DIRECT: CPT | Performed by: STUDENT IN AN ORGANIZED HEALTH CARE EDUCATION/TRAINING PROGRAM

## 2021-08-28 PROCEDURE — 250N000011 HC RX IP 250 OP 636: Performed by: STUDENT IN AN ORGANIZED HEALTH CARE EDUCATION/TRAINING PROGRAM

## 2021-08-28 PROCEDURE — 250N000009 HC RX 250: Performed by: STUDENT IN AN ORGANIZED HEALTH CARE EDUCATION/TRAINING PROGRAM

## 2021-08-28 PROCEDURE — 120N000005 HC R&B MS OVERFLOW UMMC

## 2021-08-28 PROCEDURE — 250N000011 HC RX IP 250 OP 636: Performed by: NURSE PRACTITIONER

## 2021-08-28 RX ORDER — POTASSIUM CHLORIDE 1.5 G/1.58G
20 POWDER, FOR SOLUTION ORAL ONCE
Status: COMPLETED | OUTPATIENT
Start: 2021-08-28 | End: 2021-08-28

## 2021-08-28 RX ADMIN — CLOTRIMAZOLE 1 LOZENGE: 10 LOZENGE ORAL at 08:27

## 2021-08-28 RX ADMIN — ENOXAPARIN SODIUM 40 MG: 40 INJECTION SUBCUTANEOUS at 15:50

## 2021-08-28 RX ADMIN — FLUTICASONE FUROATE AND VILANTEROL TRIFENATATE 1 PUFF: 100; 25 POWDER RESPIRATORY (INHALATION) at 08:33

## 2021-08-28 RX ADMIN — BUPROPION HYDROCHLORIDE 75 MG: 75 TABLET, FILM COATED ORAL at 08:27

## 2021-08-28 RX ADMIN — GABAPENTIN 300 MG: 300 CAPSULE ORAL at 08:26

## 2021-08-28 RX ADMIN — PIPERACILLIN AND TAZOBACTAM 3.38 G: 3; .375 INJECTION, POWDER, LYOPHILIZED, FOR SOLUTION INTRAVENOUS at 02:08

## 2021-08-28 RX ADMIN — CLOTRIMAZOLE 1 LOZENGE: 10 LOZENGE ORAL at 20:19

## 2021-08-28 RX ADMIN — PIPERACILLIN AND TAZOBACTAM 3.38 G: 3; .375 INJECTION, POWDER, LYOPHILIZED, FOR SOLUTION INTRAVENOUS at 20:19

## 2021-08-28 RX ADMIN — PANTOPRAZOLE SODIUM 40 MG: 40 TABLET, DELAYED RELEASE ORAL at 08:27

## 2021-08-28 RX ADMIN — CLOTRIMAZOLE 1 LOZENGE: 10 LOZENGE ORAL at 11:36

## 2021-08-28 RX ADMIN — SODIUM CHLORIDE 50 ML: 9 INJECTION, SOLUTION INTRAVENOUS at 18:45

## 2021-08-28 RX ADMIN — CALCIUM CARBONATE 600 MG (1,500 MG)-VITAMIN D3 400 UNIT TABLET 1 TABLET: at 08:26

## 2021-08-28 RX ADMIN — TACROLIMUS 4 MG: 1 CAPSULE ORAL at 08:26

## 2021-08-28 RX ADMIN — UMECLIDINIUM 1 PUFF: 62.5 AEROSOL, POWDER ORAL at 08:33

## 2021-08-28 RX ADMIN — PIPERACILLIN AND TAZOBACTAM 3.38 G: 3; .375 INJECTION, POWDER, LYOPHILIZED, FOR SOLUTION INTRAVENOUS at 08:18

## 2021-08-28 RX ADMIN — MICAFUNGIN SODIUM 150 MG: 50 INJECTION, POWDER, LYOPHILIZED, FOR SOLUTION INTRAVENOUS at 21:25

## 2021-08-28 RX ADMIN — GABAPENTIN 300 MG: 300 CAPSULE ORAL at 13:59

## 2021-08-28 RX ADMIN — ASPIRIN 81 MG: 81 TABLET, COATED ORAL at 08:26

## 2021-08-28 RX ADMIN — REMDESIVIR 100 MG: 100 INJECTION, POWDER, LYOPHILIZED, FOR SOLUTION INTRAVENOUS at 18:44

## 2021-08-28 RX ADMIN — AMITRIPTYLINE HYDROCHLORIDE 25 MG: 25 TABLET, FILM COATED ORAL at 21:25

## 2021-08-28 RX ADMIN — BUPROPION HYDROCHLORIDE 75 MG: 75 TABLET, FILM COATED ORAL at 20:19

## 2021-08-28 RX ADMIN — ANAKINRA 100 MG: 100 INJECTION, SOLUTION SUBCUTANEOUS at 17:20

## 2021-08-28 RX ADMIN — Medication 10 MG: at 21:25

## 2021-08-28 RX ADMIN — TACROLIMUS 4 MG: 1 CAPSULE ORAL at 17:20

## 2021-08-28 RX ADMIN — IRON SUCROSE 200 MG: 20 INJECTION, SOLUTION INTRAVENOUS at 17:19

## 2021-08-28 RX ADMIN — PIPERACILLIN AND TAZOBACTAM 3.38 G: 3; .375 INJECTION, POWDER, LYOPHILIZED, FOR SOLUTION INTRAVENOUS at 14:00

## 2021-08-28 RX ADMIN — ENOXAPARIN SODIUM 40 MG: 40 INJECTION SUBCUTANEOUS at 03:51

## 2021-08-28 RX ADMIN — CLOTRIMAZOLE 1 LOZENGE: 10 LOZENGE ORAL at 15:50

## 2021-08-28 RX ADMIN — PREDNISONE 5 MG: 5 TABLET ORAL at 08:27

## 2021-08-28 RX ADMIN — CALCIUM CARBONATE 600 MG (1,500 MG)-VITAMIN D3 400 UNIT TABLET 1 TABLET: at 17:20

## 2021-08-28 RX ADMIN — POTASSIUM CHLORIDE 20 MEQ: 1.5 POWDER, FOR SOLUTION ORAL at 08:24

## 2021-08-28 RX ADMIN — MONTELUKAST 10 MG: 10 TABLET, FILM COATED ORAL at 21:25

## 2021-08-28 RX ADMIN — GABAPENTIN 300 MG: 300 CAPSULE ORAL at 20:19

## 2021-08-28 ASSESSMENT — ACTIVITIES OF DAILY LIVING (ADL)
ADLS_ACUITY_SCORE: 13

## 2021-08-28 ASSESSMENT — MIFFLIN-ST. JEOR: SCORE: 1659.5

## 2021-08-28 NOTE — CONSULTS
[facility] Pulmonology Consultation    Jim Willingham  MRN: [id number]  : 1955    Date of Admission: 2021  Date of Service: 21    Reason for consult:  Abn CT in immunocompromised host, request for bronchoscopy  Requesting physician:  Dr. Mccarty    Assessment:  Mr. Willingham is a 66 year old with history of cardiac transplant, who presents with malaise, fever, SOB who tested positive for COVID-19. He was noted to have KALI and LLL opacities on CT scan which are concerning for atypical vs opportunistic vs fungal etiologies. Serologic testing unremarkable therefore pursuing bronchoscopy. We did discuss that he is at higher risk for complications (notably acute respiratory failure) due to his underlying moderate-severe obstructive lung disease. He is willing to undergo the procedure.    Recommendations:  -- NPO at midnight for likely bronchoscopy with lavage on   -- defer to ID and primary for anti-infectives  -- agree with inhaler therapy as ordered    Chief complaint:  Malaise, fever, shortness of breath    HPI:  Presented to ED with malaise, shortness of breath, headache. CT scan with KALI opacity, LLL opacity, and RLL opacity. He tested positive for COVID 19.    He received a heart transplant in 2021, and has been maintained on immunosuppression (basiliximab for induction, prednisone/mmf/tacro now. Ppx with pentamidine nebs most recently).  He was hospitalized in 2021, had a thoracentesis and workup for fungal infection, was ultimately discharged with course of levofloxacin and he improved.    He had been feeling well prior to the days before admission. Since admission, he has worsening shortness of breath. He has not had a productive cough. He has been walking around his room. Poor appetite. Also has some bandlike pain to his back.     He generally uses trelegy ellipta, prn albuterol inhalers at home. He also takes montelukast. His breathing generally feels ok.    He had  frequent bronchitis episodes until his 40s, but was doing well after that.    He was never a heavy smoker, would have a cigarette or two while drinking EtOH with his friends, but hasn't done that since 1990s.     Review of systems: complete 10 point review of systems completed and negative except as noted above in HPI.    Medical history:    Past Medical History:   Diagnosis Date     Bariatric surgery status 2003     Benign essential hypertension 05/11/2017     Bilateral carpal tunnel syndrome 11/02/2020     Cardiomyopathy, unspecified (H) 05/08/2017     CKD (chronic kidney disease) stage 3, GFR 30-59 ml/min 05/11/2017     COPD (chronic obstructive pulmonary disease) (H) 11/02/2020     Depression 05/11/2017     Diabetes mellitus (H) 1995     Gouty arthropathy, chronic, without tophi 11/02/2020     H/O gastric bypass 05/11/2017     ICD (implantable cardioverter-defibrillator), biventricular, in situ 05/11/2017     LVAD (left ventricular assist device) present (H)      Major depression, recurrent, chronic (H) 11/02/2020     NICM (nonischemic cardiomyopathy) (H)/ EF 20% 05/11/2017    ECHO: LVEDd. 7.66 cm, Restrictive pattern , Severe mitral valve regurgitation     CECILIA (obstructive sleep apnea) 05/11/2017     Paroxysmal atrial fibrillation (H) 05/11/2017     Paroxysmal VT (H) 05/11/2017     Rotator cuff tear arthropathy of both shoulders 11/02/2020     Uncomplicated asthma      Vitamin B12 deficiency (non anemic) 05/11/2017     Social history: as above, intermittent cigarette through 1990s. 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 No vaping. No EtOH, previously would drink some beer.     Surgical history:    Past Surgical History:   Procedure Laterality Date     ANESTHESIA CARDIOVERSION N/A 05/11/2020    Procedure: ANESTHESIA, FOR CARDIOVERSION @1100;  Surgeon: GENERIC ANESTHESIA PROVIDER;  Location: [facility] OR     CV HEART BIOPSY N/A 5/13/2021     Procedure: Heart Biopsy;  Surgeon: Scout Robins MD;  [facility]     CV HEART BIOPSY N/A 5/20/2021    Procedure: Heart Biopsy;  Surgeon: Jeffrey Gibson MD;  [facility]     CV HEART BIOPSY N/A 5/27/2021    Procedure: Heart Biopsy;  Surgeon: Jac Dover MD;  [facility]     CV HEART BIOPSY N/A 6/7/2021    Procedure: CV HEART BIOPSY;  Surgeon: Jeffrey Gibson MD;  [facility]     CV HEART BIOPSY N/A 6/21/2021    Procedure: CV HEART BIOPSY;  Surgeon: Scout Robins MD;  Location:  HEART CARDIAC CATH LAB     CV HEART BIOPSY N/A 7/5/2021    Procedure: CV HEART BIOPSY;  Surgeon: Jeffrey Gibson MD;  [facility]     CV HEART BIOPSY N/A 7/16/2021    Procedure: Heart Biopsy;  Surgeon: Amadeo Art MD;  [facility]     CV HEART BIOPSY N/A 8/5/2021    Procedure: Heart Biopsy;  Surgeon: Jeffrey Gibson MD;  Location:  HEART CARDIAC CATH LAB     CV RIGHT HEART CATH MEASUREMENTS RECORDED N/A 07/24/2019    Procedure: CV RIGHT HEART CATH;  Surgeon: Renu Sears MD;  Location:  HEART CARDIAC CATH LAB     CV RIGHT HEART CATH MEASUREMENTS RECORDED N/A 08/05/2020    Procedure: CV RIGHT HEART CATH;  Surgeon: Nicola Seth MD;  Location:  HEART CARDIAC CATH LAB     CV RIGHT HEART CATH MEASUREMENTS RECORDED N/A 01/07/2021    Procedure: CV RIGHT HEART CATH;  Surgeon: Jac Dover MD;  Location:  HEART CARDIAC CATH LAB     CV RIGHT HEART CATH MEASUREMENTS RECORDED N/A 02/23/2021    Procedure: Heart Cath Right Heart Cath;  Surgeon: Jeffrey Gibson MD;  Location:  HEART CARDIAC CATH LAB     CV RIGHT HEART CATH MEASUREMENTS RECORDED N/A 03/23/2021    Procedure: Heart Cath Right Heart Cath. request for 3/23;  Surgeon: Jeffrey Gibson MD;  Location:   HEART CARDIAC CATH LAB     CV RIGHT HEART CATH MEASUREMENTS RECORDED N/A 5/13/2021    Procedure: Right Heart Cath;  Surgeon: Scout Robins MD;  Location:  HEART CARDIAC CATH LAB     CV RIGHT HEART CATH MEASUREMENTS RECORDED N/A 5/20/2021    Procedure: Right Heart Cath;  Surgeon: Jeffrey Gibson MD;  Location:  HEART CARDIAC CATH LAB     CV RIGHT HEART CATH MEASUREMENTS RECORDED N/A 5/27/2021    Procedure: Right Heart Cath;  Surgeon: Jac Dover MD;  Location:  HEART CARDIAC CATH LAB     CV RIGHT HEART CATH MEASUREMENTS RECORDED N/A 6/7/2021    Procedure: CV RIGHT HEART CATH;  Surgeon: Jeffrey Gibson MD;  Location:  HEART CARDIAC CATH LAB     CV RIGHT HEART CATH MEASUREMENTS RECORDED N/A 6/21/2021    Procedure: CV RIGHT HEART CATH;  Surgeon: Scout Robins MD;  Location:  HEART CARDIAC CATH LAB     CV RIGHT HEART CATH MEASUREMENTS RECORDED N/A 7/5/2021    Procedure: CV RIGHT HEART CATH;  Surgeon: Jeffrey Gibson MD;  Location:  HEART CARDIAC CATH LAB     CV RIGHT HEART CATH MEASUREMENTS RECORDED N/A 7/16/2021    Procedure: Right Heart Cath;  Surgeon: Amadeo Art MD;  Location:  HEART CARDIAC CATH LAB     CV RIGHT HEART CATH MEASUREMENTS RECORDED N/A 8/5/2021    Procedure: CV RIGHT HEART CATH;  Surgeon: Jeffrey Gibson MD;  Location:  HEART CARDIAC CATH LAB     GI SURGERY  2003    Sylvester en Y     INSERT VENTRICULAR ASSIST DEVICE LEFT (HEARTMATE II) N/A 06/19/2017    Procedure: INSERT VENTRICULAR ASSIST DEVICE LEFT (HEARTMATE II);  Median Sternotomy Heartmate II Left Ventricular Assist Device Insertion on Pump Oxygenator;  Surgeon: Ronnie Quigley MD;  Location: [facility] OR     IR CHEST TUBE PLACEMENT NON-TUNNELED RIGHT  7/11/2021     ORTHOPEDIC SURGERY  1994    right knee wired     PICC DOUBLE LUMEN PLACEMENT Right 09/23/2020    5FR PICC DL. Length-43cm (1cm out).     PICC INSERTION - DOUBLE LUMEN Right 05/09/2021     IK/BRACH     RELEASE CARPAL TUNNEL BILATERAL Bilateral 02/18/2021    Procedure: Bilateral carpal tunnel release;  Surgeon: Jermaine Brand MD;  Location: [facility] OR     TRANSPLANT HEART RECIPIENT N/A 05/05/2021    Procedure: Redo median sternotomy, lysis of adhesions, heart transplant recipient, on cardiopulmonary bypass, intraoperative transesophageal echocardiogram per anesthesia, Implantable Cardioverter Defibrillator (ICD) removal;  Surgeon: Ronnie Quigley MD;  Location: [facility] OR     Family history: I have reviewed family history in EMR.  Family History   Problem Relation Age of Onset     Cerebrovascular Disease Mother 64     Diabetes Mother      Hypertension Mother      Coronary Artery Disease Father      Diabetes Type 2  Father      Obesity Brother      Obesity Brother      Cerebrovascular Disease Daughter 40       Immunizations:  prevnar 13 2/2015    Allergies:    Allergies   Allergen Reactions     Grass Shortness Of Breath     Ace Inhibitors Cough     Dust Mites Other (See Comments)     Asthma     Mold Other (See Comments)     Asthma     Penicillins Other (See Comments)     Unknown - childhood exposure    Tolerated Zosyn 9/18-9/20/2020     Sulfa Drugs Other (See Comments) and Unknown     Unknown childhood reaction     Medications:    Current Facility-Administered Medications   Medication     remdesivir 100 mg in sodium chloride 0.9 % 250 mL intermittent infusion    And     0.9% sodium chloride BOLUS     acetaminophen (TYLENOL) tablet 650 mg    Or     acetaminophen (TYLENOL) Suppository 650 mg     albuterol (PROAIR HFA/PROVENTIL HFA/VENTOLIN HFA) 108 (90 Base) MCG/ACT inhaler 2 puff     amitriptyline (ELAVIL) tablet 25 mg     anakinra (KINERET) subcutaneous injection 100 mg     artificial tears ophthalmic ointment     aspirin EC tablet 81 mg     buPROPion (WELLBUTRIN) tablet 75 mg     calcium carbonate 600 mg-vitamin D 400 units (CALTRATE) per tablet 1 tablet     clotrimazole (MYCELEX) lozenge 1 lozenge     glucose gel  "15-30 g    Or     dextrose 50 % injection 25-50 mL    Or     glucagon injection 1 mg     diphenhydrAMINE (BENADRYL) injection 50 mg     enoxaparin ANTICOAGULANT (LOVENOX) injection 40 mg     EPINEPHrine (ADRENALIN) kit 0.3 mg     famotidine (PEPCID) injection 20 mg     fluticasone-vilanterol (BREO ELLIPTA) 100-25 MCG/INH inhaler 1 puff     gabapentin (NEURONTIN) capsule 300 mg     HYDROmorphone (DILAUDID) half-tab 1-2 mg     insulin aspart (NovoLOG) injection (RAPID ACTING)     insulin aspart (NovoLOG) injection (RAPID ACTING)     insulin glargine (LANTUS PEN) injection 10 Units     iron sucrose (VENOFER) 200 mg in sodium chloride 0.9 % 110 mL intermittent infusion     melatonin tablet 10 mg     methylPREDNISolone sodium succinate (solu-MEDROL) injection 125 mg     micafungin (MYCAMINE) 150 mg in sodium chloride 0.9 % 100 mL intermittent infusion     montelukast (SINGULAIR) tablet 10 mg     naloxone (NARCAN) injection 0.2 mg    Or     naloxone (NARCAN) injection 0.4 mg    Or     naloxone (NARCAN) injection 0.2 mg    Or     naloxone (NARCAN) injection 0.4 mg     pantoprazole (PROTONIX) EC tablet 40 mg     piperacillin-tazobactam (ZOSYN) 3.375 g vial to attach to  mL bag     polyethylene glycol (MIRALAX) Packet 17 g     predniSONE (DELTASONE) tablet 5 mg     Reason ACE/ARB/ARNI order not selected     Reason beta blocker not prescribed     sennosides (SENOKOT) tablet 8.6 mg     sodium chloride (PF) 0.9% PF flush 3 mL     sodium chloride (PF) 0.9% PF flush 3 mL     tacrolimus (GENERIC EQUIVALENT) capsule 4 mg     traMADol (ULTRAM) half-tab 25-50 mg     umeclidinium (INCRUSE ELLIPTA) 62.5 MCG/INH inhaler 1 puff     Objective:  BP (!) 139/96 (BP Location: Right arm)   Pulse 94   Temp 98  F (36.7  C) (Oral)   Resp 20   Ht 1.727 m (5' 8\")   Wt 89.5 kg (197 lb 5 oz)   SpO2 99%   BMI 30.00 kg/m      Gen: in no acute distress, sitting upright in bed  HEENT: MMM, no oral lesions appreciated  CV: RRR, no murmurs " appreciated, extremities warm, no peripheral edema  Pulm: no increased work of breathing, diminished breath sounds RLL, otherwise clear to auscultation bilaterally  GI: soft, not distended  MSK: no gross deformities, moving all extremities  Integ: no rashes or lesions appreciated  Neuro: speech clear, alert and oriented  Psych: calm, appropriate    Data:    I have personally reviewed laboratory data. Notably: Cr ~1.9, WBC 2.6, Hgb 7.5, plt 22. procal 8/24 0.16.  I have personally reviewed imaging available. Notably: CT with improvement of RLL opacity from June, new left upper and lower lobe infiltrates.  I have personally reviewed cardiac studies. Notably: TTE with EF 60-65%, rv intact, LVEF 60-65%, no valvular abn, trivial pericardial effusion  Most recent PFTs: 1/21/21 FEV1 1.24, FVC 2.38, FEV/FVC 52, DLCO 76% pred.    Kateryna Gleason DO  Pulmonary fellow  Pager: [phone]    Patient discussed and seen with Dr. Berger.

## 2021-08-28 NOTE — PROGRESS NOTES
Cardiology Progress Note    Assessment & Plan   Mr. Jim Willingham is a 66yr old male with a history of NICM (s/p HM2 LVAD 6/2017), VT, AFib, CKD, DMII, COPD, now s/p OHT 5/6/21, who presented to the ER with shortness of breath and headache, found to have COVID PNA and KALI cavitary lesion.      Changes Today:  - Continue Remdesivir  - Continue zosyn/micafungin  - Decrease Lantus to 10 units at bedtime, given AM hypoglycemia  - Check daily tacrolimus level  - Echocardiogram  - Consulted pulmonary for bronchoscopy with BAL     NICM, s/p OHT 5/6/21  His post-transplant course has been c/b right pleural air leak (required prolonged chest tube), pAFib, CAP (RLL, 6/2021), recurrent pleural effusions (s/p thoracenteses x2 6/2021 and 7/2021), RLL cavitary lesions (per chest CT 7/14/21, BD glucan indeterminate, aspergillus negative), pericardial effusion (1.4cm per TTE 7/12/21, conservatively managed), low-grade EBV viremia, and recurrent/persistent gout flare.     Rejection history:  none  AlloMap scores:  n/a  DSAs:  none  Coronary angio/Ischemic eval:  Deferred due to renal dysfunction  Last RHC:  8/5/21, mildly elevated biventricular filling pressures with RA 10, mPA 25, PCW 14, and CI 2.95.  Echo/cMRI:  TTE 8/5/21 showed stable graft function, with LVEF 60-65% and normal RV size/function, and trivial loculated pericardial effusion.  - repeat echo     Serostatus:  - CMV D+/R+  - EBV D+/R+  - Toxo D-/R-     Immunosuppression:  - Continue baseline prednisone 5mg   - Holding MMF given COVID (PTA dose 1500mg twice daily)  - continue tacrolimus, goal level 8-10 (tacro level 9.0 on 8/25).  Continue tacro 4mg twice daily (PTA dose)  - check tacrolimus levels daily     PPx:  - CAV:  Aspirin 81mg daily.  Statin had been held due to elevated LFTs, defer restarting now.  - GI:  Pantoprazole 40mg daily  - Osteoporosis:  Calcium/vitamin D supplements  - CMV:  Completed 3m Valcyte therapy, level undetectable on admission  - PCP:   Received pentamidine 8/11, due q28 days  - Thrush:  Clotrimazole troches QID -- started 8/5 to augment tacro levels  - Toxo:  n/a     Graft function:  - BPs:  Controlled  - HRs:  borderline Tachycardic, 80s-90s  - fluid status:  Euvolemic, not on diuretics       COVID-19 PNA  Presented with dyspnea and fevers. Found to be covid positive (x2). D-Dimer 3.27. Currently on RA.   - Llovenox 0.5mg/kg subcutaneous q12 hours  - S/p COVID-19 monoclonal antibodies x1 --- ok'd and recommended per ID, discussed with pharmacy  - Continue remdesivir 100mg daily for a total of 5 days (day 5/5)  - CBC with diff trends     Recurrent exudative pleural effusions (6/2021, 7/2021)  KALI Cavitary Lung Lesion  The effusion was negative for fungal/bacterial/AFB cx.  Treated with IV ABx then levaquin.  Urine and serum Blasto and Histo Ag were negative, A galactomannan was negative, and BD glucan was intermediate. Repeat chest CT showed peripheral consolidative opacity in the left upper lobe which appears partially cavitating. Has associated pleuritic chest pain. 1,3 Beta D Glucan and Aspergillus galactomanan negative  - Continue zosyn/micafungin  - Transplant ID following  - Consulted pulmonary for bronchoscopy with BAL  - Low dose PO hydromorphone only for severe breakthrough pain.     EBV viremia  EBV quant <500 this admission.     Gout/pseudogout  Predated transplant.  Has had recurrent flares following transplant, and continues to follow with rheumatology.  He notes that his current flare started in his fingers, and has now progressed up to his elbow.  The pain limits his activity.  He has been treated with Anakinra, with positive results, but has not been approved for outpatient use by his insurance.  He recently completed a steroid burst per rheum (30mg x 4 days, 20mg x 4 days, 10mg x 4 days, then back to 5mg per transplant protocol), with little effect, so was advised to start a prednisone 40mg x 5 days today -- which he has not yet  started.  He was given a one-time dose of hydrocortisone for adrenal insufficiency. He continues to complain of pain.   - Start Anakinra 100mg SubQ qday for 3 days (Day 3/3). If not improving, will formally consult Rheum  - defer PTA allopurinol therapy given active flare     Iron deficiency anemia  Iron sat 8/5 was 13%.  He was started on po iron, but note that absorption is impaired while on po PPI.   - Start IV Venofer (8/26-8/30)     WALTER on CKD  Creatinine had been stable at 1.4-1.6, but has been elevated to 1.9-2s in the recent weeks.  Likely ddx include tacro therapy and mild hypovolemia.  He received IVF in the ED.  He is not on diuretics.  Will closely monitor tacro levels. Stable around 2.   - continue to trend BMP daily     Transaminitis, resolved  Questionable Choledocholithiasis  Abdominal ultrasound 7/14/21 showing hepatomegaly with steatosis and no biliary dilatation.  LFTs stable on admission.   - trend LFTs     Severe protein calorie malnutrition  History of Sylvester En Y gastric bypass  Albumin on arrival 2.6.  - nutrition consult      DMII:  Decrease lantus to 10 units at bedtime (PTA 18units), given AM hypoglycemia, med sliding scale insulin, hypoglycemia protocol.  May need to consider endo consult pending BG trend.  Depression:  PTA wellbutrin 75mg BID  COPD:  PTA singulair, Trelegy Ellipta, Albuterol     Patient seen and discussed with Dr. Dg Del Rio [facility]  Internal Medicine Resident, PGY-2  Pager: [phone]      Interval History   No events overnight. Feels about the same. Pain has improved slightly. Breathing is unchanged, no dyspnea reported.    Physical Exam   Temp: 97.7  F (36.5  C) Temp src: Oral BP: 118/84 Pulse: 100   Resp: 18 SpO2: 97 % O2 Device: None (Room air)    Vitals:    08/26/21 0500 08/27/21 0600 08/28/21 0500   Weight: 87.3 kg (192 lb 6.4 oz) 88 kg (194 lb 0.1 oz) 89.5 kg (197 lb 5 oz)     Vital Signs with Ranges  Temp:  [97.4  F (36.3  C)-98.2  F (36.8  C)] 97.7  F  "(36.5  C)  Pulse:  [] 100  Resp:  [15-18] 18  BP: (104-133)/(67-95) 118/84  FiO2 (%):  [30 %] 30 %  SpO2:  [94 %-100 %] 97 %  I/O last 3 completed shifts:  In: 1320 [P.O.:860; I.V.:460]  Out: 400 [Urine:400]     , Blood pressure 118/84, pulse 100, temperature 97.7  F (36.5  C), temperature source Oral, resp. rate 18, height 1.727 m (5' 8\"), weight 89.5 kg (197 lb 5 oz), SpO2 97 %.  197 lbs 4.99 oz  GEN:  Alert, oriented x 3, appears comfortable, NAD.  HEENT: NC/AT, no scleral icterus  CV:  Regular rate and rhythm, no murmur or JVD.  S1 + S2 noted, no S3 or S4.  LUNGS:  Breathing comfortably. No crackles appreciated.   ABD:  +bowel sounds, soft, non-tender/non-distended.  No rebound/guarding/rigidity.  EXT:  No edema or cyanosis.  Warm.  NEURO: alert and oriented, moving all 4 extremities to command     Medications    ACE/ARB/ARNI NOT PRESCRIBED      BETA BLOCKER NOT PRESCRIBED        remdesivir  100 mg Intravenous Q24H    And    sodium chloride 0.9%  50 mL Intravenous Q24H    amitriptyline  25 mg Oral At Bedtime    anakinra  100 mg Subcutaneous Q24H    aspirin  81 mg Oral Daily    buPROPion  75 mg Oral BID    calcium carbonate 600 mg-vitamin D 400 units  1 tablet Oral BID w/meals    clotrimazole  1 lozenge Buccal 4x Daily    enoxaparin ANTICOAGULANT  0.5 mg/kg Subcutaneous Q12H    fluticasone-vilanterol  1 puff Inhalation Daily    gabapentin  300 mg Oral TID    insulin aspart  1-7 Units Subcutaneous TID AC    insulin aspart  1-5 Units Subcutaneous At Bedtime    insulin glargine  10 Units Subcutaneous At Bedtime    iron sucrose (VENOFER) intermittent infusion (200 mg)  200 mg Intravenous Q24H    melatonin  10 mg Oral At Bedtime    micafungin  150 mg Intravenous Q24H    montelukast  10 mg Oral At Bedtime    pantoprazole  40 mg Oral QAM AC    piperacillin-tazobactam  3.375 g Intravenous Q6H    predniSONE  5 mg Oral Daily    sodium chloride (PF)  3 mL Intracatheter Q8H    tacrolimus  4 mg Oral BID IS    " umeclidinium  1 puff Inhalation Daily       Data   Recent Labs   Lab 08/28/21  1130 08/28/21  0944 08/28/21  0839 08/28/21  0633 08/27/21  0550 08/26/21  0539 08/24/21  1758 08/24/21  0613   WBC  --   --   --  2.6* 2.6* 4.5   < > 5.1   HGB  --   --   --  7.5* 7.1* 7.3*   < > 8.8*   MCV  --   --   --  88 91 89   < > 90   PLT  --   --   --  222 226 233   < > 295   NA  --   --   --  137 135 135   < > 136   POTASSIUM  --   --   --  3.4 3.7 3.6   < > 4.1   CHLORIDE  --   --   --  110* 106 107   < > 106   CO2  --   --   --  25 22 23   < > 26   BUN  --   --   --  23 30 34*   < > 32*   CR  --   --   --  1.89* 2.04* 2.01*   < > 1.91*   ANIONGAP  --   --   --  2* 7 5   < > 4   QAMAR  --   --   --  7.7* 7.9* 7.4*   < > 8.0*   * 92 93 64* 134* 118*   < > 192*   ALBUMIN  --   --   --   --   --   --   --  2.6*   PROTTOTAL  --   --   --   --   --   --   --  6.2*   BILITOTAL  --   --   --   --   --   --   --  0.6   ALKPHOS  --   --   --   --   --   --   --  122   ALT  --   --   --   --   --   --   --  17   AST  --   --   --   --   --   --   --  10   TROPONIN  --   --   --   --   --   --   --  <0.015    < > = values in this interval not displayed.       No results found for this or any previous visit (from the past 24 hour(s)).

## 2021-08-28 NOTE — PLAN OF CARE
Neuro: A&Ox4.   Cardiac: SR. VSS.   Respiratory: Sating adequately on RA, CPAP used overnight.  GI/: Adequate urine output. BM X1-loose  Diet/appetite: Tolerating regular diet.   Activity:  Assist of SBA.  Pain: At acceptable level on current regimen.   Skin: No new deficits noted.  LDA's: PIV    Plan: Continue with POC. Notify primary team with changes. Pt slept well overnight.

## 2021-08-29 ENCOUNTER — APPOINTMENT (OUTPATIENT)
Dept: OCCUPATIONAL THERAPY | Facility: CLINIC | Age: 66
DRG: 177 | End: 2021-08-29
Payer: COMMERCIAL

## 2021-08-29 ENCOUNTER — APPOINTMENT (OUTPATIENT)
Dept: CARDIOLOGY | Facility: CLINIC | Age: 66
DRG: 177 | End: 2021-08-29
Attending: STUDENT IN AN ORGANIZED HEALTH CARE EDUCATION/TRAINING PROGRAM
Payer: COMMERCIAL

## 2021-08-29 LAB
ANION GAP SERPL CALCULATED.3IONS-SCNC: 5 MMOL/L (ref 3–14)
BACTERIA BLD CULT: NO GROWTH
BACTERIA BLD CULT: NO GROWTH
BASOPHILS # BLD MANUAL: 0.1 10E3/UL (ref 0–0.2)
BASOPHILS NFR BLD MANUAL: 3 %
BUN SERPL-MCNC: 16 MG/DL (ref 7–30)
CALCIUM SERPL-MCNC: 7.9 MG/DL (ref 8.5–10.1)
CHLORIDE BLD-SCNC: 111 MMOL/L (ref 94–109)
CO2 SERPL-SCNC: 26 MMOL/L (ref 20–32)
CREAT SERPL-MCNC: 1.78 MG/DL (ref 0.66–1.25)
EOSINOPHIL # BLD MANUAL: 0.1 10E3/UL (ref 0–0.7)
EOSINOPHIL NFR BLD MANUAL: 2 %
ERYTHROCYTE [DISTWIDTH] IN BLOOD BY AUTOMATED COUNT: 16.9 % (ref 10–15)
FRAGMENTS BLD QL SMEAR: SLIGHT
GFR SERPL CREATININE-BSD FRML MDRD: 39 ML/MIN/1.73M2
GLUCOSE BLD-MCNC: 50 MG/DL (ref 70–99)
GLUCOSE BLDC GLUCOMTR-MCNC: 105 MG/DL (ref 70–99)
GLUCOSE BLDC GLUCOMTR-MCNC: 108 MG/DL (ref 70–99)
GLUCOSE BLDC GLUCOMTR-MCNC: 125 MG/DL (ref 70–99)
GLUCOSE BLDC GLUCOMTR-MCNC: 142 MG/DL (ref 70–99)
GLUCOSE BLDC GLUCOMTR-MCNC: 166 MG/DL (ref 70–99)
GLUCOSE BLDC GLUCOMTR-MCNC: 200 MG/DL (ref 70–99)
GLUCOSE BLDC GLUCOMTR-MCNC: 219 MG/DL (ref 70–99)
GLUCOSE BLDC GLUCOMTR-MCNC: 225 MG/DL (ref 70–99)
GLUCOSE BLDC GLUCOMTR-MCNC: 51 MG/DL (ref 70–99)
GLUCOSE BLDC GLUCOMTR-MCNC: 73 MG/DL (ref 70–99)
GLUCOSE BLDC GLUCOMTR-MCNC: 83 MG/DL (ref 70–99)
HCT VFR BLD AUTO: 25.3 % (ref 40–53)
HGB BLD-MCNC: 7.7 G/DL (ref 13.3–17.7)
LVEF ECHO: NORMAL
LYMPHOCYTES # BLD MANUAL: 0.9 10E3/UL (ref 0.8–5.3)
LYMPHOCYTES NFR BLD MANUAL: 25 %
MAGNESIUM SERPL-MCNC: 1.5 MG/DL (ref 1.6–2.3)
MCH RBC QN AUTO: 27 PG (ref 26.5–33)
MCHC RBC AUTO-ENTMCNC: 30.4 G/DL (ref 31.5–36.5)
MCV RBC AUTO: 89 FL (ref 78–100)
MONOCYTES # BLD MANUAL: 0.8 10E3/UL (ref 0–1.3)
MONOCYTES NFR BLD MANUAL: 23 %
NEUTROPHILS # BLD MANUAL: 1.7 10E3/UL (ref 1.6–8.3)
NEUTROPHILS NFR BLD MANUAL: 47 %
PLAT MORPH BLD: ABNORMAL
PLATELET # BLD AUTO: 253 10E3/UL (ref 150–450)
POTASSIUM BLD-SCNC: 3.3 MMOL/L (ref 3.4–5.3)
RBC # BLD AUTO: 2.85 10E6/UL (ref 4.4–5.9)
RBC MORPH BLD: ABNORMAL
SODIUM SERPL-SCNC: 142 MMOL/L (ref 133–144)
TACROLIMUS BLD-MCNC: 13.3 UG/L (ref 5–15)
TME LAST DOSE: NORMAL H
TME LAST DOSE: NORMAL H
VARIANT LYMPHS BLD QL SMEAR: PRESENT
WBC # BLD AUTO: 3.6 10E3/UL (ref 4–11)

## 2021-08-29 PROCEDURE — 36415 COLL VENOUS BLD VENIPUNCTURE: CPT | Performed by: NURSE PRACTITIONER

## 2021-08-29 PROCEDURE — 120N000005 HC R&B MS OVERFLOW UMMC

## 2021-08-29 PROCEDURE — 250N000011 HC RX IP 250 OP 636: Performed by: STUDENT IN AN ORGANIZED HEALTH CARE EDUCATION/TRAINING PROGRAM

## 2021-08-29 PROCEDURE — 93308 TTE F-UP OR LMTD: CPT | Mod: 26 | Performed by: STUDENT IN AN ORGANIZED HEALTH CARE EDUCATION/TRAINING PROGRAM

## 2021-08-29 PROCEDURE — 250N000013 HC RX MED GY IP 250 OP 250 PS 637: Performed by: STUDENT IN AN ORGANIZED HEALTH CARE EDUCATION/TRAINING PROGRAM

## 2021-08-29 PROCEDURE — 93325 DOPPLER ECHO COLOR FLOW MAPG: CPT | Mod: 26 | Performed by: STUDENT IN AN ORGANIZED HEALTH CARE EDUCATION/TRAINING PROGRAM

## 2021-08-29 PROCEDURE — 80197 ASSAY OF TACROLIMUS: CPT

## 2021-08-29 PROCEDURE — 250N000011 HC RX IP 250 OP 636

## 2021-08-29 PROCEDURE — 258N000003 HC RX IP 258 OP 636: Performed by: STUDENT IN AN ORGANIZED HEALTH CARE EDUCATION/TRAINING PROGRAM

## 2021-08-29 PROCEDURE — 250N000013 HC RX MED GY IP 250 OP 250 PS 637

## 2021-08-29 PROCEDURE — 250N000011 HC RX IP 250 OP 636: Performed by: INTERNAL MEDICINE

## 2021-08-29 PROCEDURE — 93308 TTE F-UP OR LMTD: CPT

## 2021-08-29 PROCEDURE — 97110 THERAPEUTIC EXERCISES: CPT | Mod: GO

## 2021-08-29 PROCEDURE — 250N000011 HC RX IP 250 OP 636: Performed by: NURSE PRACTITIONER

## 2021-08-29 PROCEDURE — 250N000012 HC RX MED GY IP 250 OP 636 PS 637: Performed by: STUDENT IN AN ORGANIZED HEALTH CARE EDUCATION/TRAINING PROGRAM

## 2021-08-29 PROCEDURE — 83735 ASSAY OF MAGNESIUM: CPT | Performed by: INTERNAL MEDICINE

## 2021-08-29 PROCEDURE — 80048 BASIC METABOLIC PNL TOTAL CA: CPT | Performed by: NURSE PRACTITIONER

## 2021-08-29 PROCEDURE — 250N000013 HC RX MED GY IP 250 OP 250 PS 637: Performed by: NURSE PRACTITIONER

## 2021-08-29 PROCEDURE — 99233 SBSQ HOSP IP/OBS HIGH 50: CPT | Mod: 24 | Performed by: INTERNAL MEDICINE

## 2021-08-29 PROCEDURE — 250N000012 HC RX MED GY IP 250 OP 636 PS 637: Performed by: NURSE PRACTITIONER

## 2021-08-29 PROCEDURE — 85048 AUTOMATED LEUKOCYTE COUNT: CPT | Performed by: NURSE PRACTITIONER

## 2021-08-29 PROCEDURE — 93321 DOPPLER ECHO F-UP/LMTD STD: CPT | Mod: 26 | Performed by: STUDENT IN AN ORGANIZED HEALTH CARE EDUCATION/TRAINING PROGRAM

## 2021-08-29 RX ORDER — POTASSIUM CHLORIDE 1.5 G/1.58G
20 POWDER, FOR SOLUTION ORAL ONCE
Status: COMPLETED | OUTPATIENT
Start: 2021-08-29 | End: 2021-08-29

## 2021-08-29 RX ORDER — MAGNESIUM SULFATE 1 G/100ML
1 INJECTION INTRAVENOUS ONCE
Status: COMPLETED | OUTPATIENT
Start: 2021-08-29 | End: 2021-08-29

## 2021-08-29 RX ADMIN — Medication 2 MG: at 00:03

## 2021-08-29 RX ADMIN — PIPERACILLIN AND TAZOBACTAM 3.38 G: 3; .375 INJECTION, POWDER, LYOPHILIZED, FOR SOLUTION INTRAVENOUS at 02:10

## 2021-08-29 RX ADMIN — ASPIRIN 81 MG: 81 TABLET, COATED ORAL at 08:47

## 2021-08-29 RX ADMIN — BUPROPION HYDROCHLORIDE 75 MG: 75 TABLET, FILM COATED ORAL at 08:47

## 2021-08-29 RX ADMIN — ALBUTEROL SULFATE 2 PUFF: 90 AEROSOL, METERED RESPIRATORY (INHALATION) at 20:26

## 2021-08-29 RX ADMIN — PIPERACILLIN AND TAZOBACTAM 3.38 G: 3; .375 INJECTION, POWDER, LYOPHILIZED, FOR SOLUTION INTRAVENOUS at 14:48

## 2021-08-29 RX ADMIN — GABAPENTIN 300 MG: 300 CAPSULE ORAL at 08:46

## 2021-08-29 RX ADMIN — CALCIUM CARBONATE 600 MG (1,500 MG)-VITAMIN D3 400 UNIT TABLET 1 TABLET: at 08:47

## 2021-08-29 RX ADMIN — MONTELUKAST 10 MG: 10 TABLET, FILM COATED ORAL at 22:04

## 2021-08-29 RX ADMIN — PANTOPRAZOLE SODIUM 40 MG: 40 TABLET, DELAYED RELEASE ORAL at 08:46

## 2021-08-29 RX ADMIN — TACROLIMUS 4 MG: 1 CAPSULE ORAL at 08:46

## 2021-08-29 RX ADMIN — MICAFUNGIN SODIUM 150 MG: 50 INJECTION, POWDER, LYOPHILIZED, FOR SOLUTION INTRAVENOUS at 21:12

## 2021-08-29 RX ADMIN — MAGNESIUM SULFATE 1 G: 1 INJECTION INTRAVENOUS at 13:19

## 2021-08-29 RX ADMIN — MAGNESIUM SULFATE HEPTAHYDRATE 3 G: 500 INJECTION, SOLUTION INTRAMUSCULAR; INTRAVENOUS at 23:59

## 2021-08-29 RX ADMIN — Medication 10 MG: at 22:04

## 2021-08-29 RX ADMIN — CALCIUM CARBONATE 600 MG (1,500 MG)-VITAMIN D3 400 UNIT TABLET 1 TABLET: at 17:24

## 2021-08-29 RX ADMIN — PIPERACILLIN AND TAZOBACTAM 3.38 G: 3; .375 INJECTION, POWDER, LYOPHILIZED, FOR SOLUTION INTRAVENOUS at 20:16

## 2021-08-29 RX ADMIN — IRON SUCROSE 200 MG: 20 INJECTION, SOLUTION INTRAVENOUS at 17:24

## 2021-08-29 RX ADMIN — FLUTICASONE FUROATE AND VILANTEROL TRIFENATATE 1 PUFF: 100; 25 POWDER RESPIRATORY (INHALATION) at 08:47

## 2021-08-29 RX ADMIN — ENOXAPARIN SODIUM 40 MG: 40 INJECTION SUBCUTANEOUS at 17:24

## 2021-08-29 RX ADMIN — UMECLIDINIUM 1 PUFF: 62.5 AEROSOL, POWDER ORAL at 08:47

## 2021-08-29 RX ADMIN — GABAPENTIN 300 MG: 300 CAPSULE ORAL at 20:16

## 2021-08-29 RX ADMIN — PIPERACILLIN AND TAZOBACTAM 3.38 G: 3; .375 INJECTION, POWDER, LYOPHILIZED, FOR SOLUTION INTRAVENOUS at 08:46

## 2021-08-29 RX ADMIN — ENOXAPARIN SODIUM 40 MG: 40 INJECTION SUBCUTANEOUS at 04:14

## 2021-08-29 RX ADMIN — CLOTRIMAZOLE 1 LOZENGE: 10 LOZENGE ORAL at 08:47

## 2021-08-29 RX ADMIN — PREDNISONE 5 MG: 5 TABLET ORAL at 08:47

## 2021-08-29 RX ADMIN — ACETAMINOPHEN 650 MG: 325 TABLET, FILM COATED ORAL at 09:01

## 2021-08-29 RX ADMIN — POTASSIUM CHLORIDE 20 MEQ: 1.5 POWDER, FOR SOLUTION ORAL at 13:20

## 2021-08-29 RX ADMIN — BUPROPION HYDROCHLORIDE 75 MG: 75 TABLET, FILM COATED ORAL at 20:16

## 2021-08-29 RX ADMIN — GABAPENTIN 300 MG: 300 CAPSULE ORAL at 14:48

## 2021-08-29 RX ADMIN — ACETAMINOPHEN 650 MG: 325 TABLET, FILM COATED ORAL at 14:58

## 2021-08-29 RX ADMIN — TACROLIMUS 4 MG: 1 CAPSULE ORAL at 17:24

## 2021-08-29 RX ADMIN — POTASSIUM CHLORIDE 20 MEQ: 1.5 POWDER, FOR SOLUTION ORAL at 11:16

## 2021-08-29 RX ADMIN — INSULIN ASPART 2 UNITS: 100 INJECTION, SOLUTION INTRAVENOUS; SUBCUTANEOUS at 17:24

## 2021-08-29 RX ADMIN — CLOTRIMAZOLE 1 LOZENGE: 10 LOZENGE ORAL at 11:17

## 2021-08-29 RX ADMIN — AMITRIPTYLINE HYDROCHLORIDE 25 MG: 25 TABLET, FILM COATED ORAL at 22:04

## 2021-08-29 ASSESSMENT — ACTIVITIES OF DAILY LIVING (ADL)
ADLS_ACUITY_SCORE: 13

## 2021-08-29 ASSESSMENT — MIFFLIN-ST. JEOR: SCORE: 1671.5

## 2021-08-29 NOTE — PLAN OF CARE
Neuro: A&Ox4.   Cardiac: SR. VSS.   Respiratory: Sating adequately on RA, CPAP at night.  GI/: Adequate urine output. BM X1-loose  Diet/appetite: Tolerating regular diet. Eating well. NPO at midnight in preporation of Bronch today.  Activity:  Assist of SBA, up to commode throughout the night.  Pain: At acceptable level on current regimen.   Skin: No new deficits noted.  LDA's: PIV    Plan: Continue with POC. Notify primary team with changes. Pt slept well overnight.

## 2021-08-29 NOTE — PLAN OF CARE
"Neuro: A&Ox4.   Cardiac: SR-ST. BP (!) 139/96 (BP Location: Right arm)   Pulse 94   Temp 98  F (36.7  C) (Oral)   Resp 20   Ht 1.727 m (5' 8\")   Wt 89.5 kg (197 lb 5 oz)   SpO2 99%   BMI 30.00 kg/m      Respiratory: Sating  on RA, complains of SOB at times   GI/: Adequate urine output. BM X3 loose   Diet/appetite: Tolerating Reg diet. Poor appetite, needs encouragement   Activity: SBA up in room, independent up to commode  Pain: Complains of back pain, but refused dilaudid  Skin: Generalized bruising   LDA's: PIVx1     Plan: Continue with POC. Notify primary team with changes.   "

## 2021-08-29 NOTE — PROGRESS NOTES
Cardiology Progress Note    Assessment & Plan   Mr. Jim Willingham is a 66yr old male with a history of NICM (s/p HM2 LVAD 6/2017), VT, AFib, CKD, DMII, COPD, now s/p OHT 5/6/21, who presented to the ER with shortness of breath and headache, found to have COVID PNA and KALI cavitary lesion.      Changes Today:  - Continue Remdesivir  - Continue zosyn/micafungin  - Hold Lantus, given AM hypoglycemia, NPO after midnight for bronch tomorrow  - Stop clotrimazole troches, given elevated tacrolimus level  - Bronch with BAL on Monday, 8/30. NPO at midnight.   - Patient due for RHC, biopsy. Will order for these to be done while inpatient (orders put in for 8/31)  - Discuss antibiotic plan with ID     NICM, s/p OHT 5/6/21  His post-transplant course has been c/b right pleural air leak (required prolonged chest tube), pAFib, CAP (RLL, 6/2021), recurrent pleural effusions (s/p thoracenteses x2 6/2021 and 7/2021), RLL cavitary lesions (per chest CT 7/14/21, BD glucan indeterminate, aspergillus negative), pericardial effusion (1.4cm per TTE 7/12/21, conservatively managed), low-grade EBV viremia, and recurrent/persistent gout flare.     Rejection history:  none  AlloMap scores:  n/a  DSAs:  none  Coronary angio/Ischemic eval:  Deferred due to renal dysfunction  Last RHC:  8/5/21, mildly elevated biventricular filling pressures with RA 10, mPA 25, PCW 14, and CI 2.95.  Echo/cMRI:  TTE 8/5/21 showed stable graft function, with LVEF 60-65% and normal RV size/function, and trivial loculated pericardial effusion.  -Follow-up repeat echo read  - Patient due for RHC, biopsy. Will order for these to be done while inpatient (orders put in for 8/31)     Serostatus:  - CMV D+/R+  - EBV D+/R+  - Toxo D-/R-     Immunosuppression:  - Continue baseline prednisone 5mg   - Holding MMF given COVID (PTA dose 1500mg twice daily)  - continue tacrolimus, goal level 8-10 (tacro level 9.0 on 8/25).  Continue tacro 4mg twice daily (PTA dose)  - check  tacrolimus levels daily. Follow up level     PPx:  - CAV:  Aspirin 81mg daily.  Statin had been held due to elevated LFTs, defer restarting now.  - GI:  Pantoprazole 40mg daily  - Osteoporosis:  Calcium/vitamin D supplements  - CMV:  Completed 3m Valcyte therapy, level undetectable on admission  - PCP:  Received pentamidine 8/11, due q28 days  - Thrush:  Clotrimazole troches QID -- started 8/5 to augment tacro levels (stopped on 8/29, given elevated tacro level)  - Toxo:  n/a     Graft function:  - BPs:  Controlled  - HRs:  borderline Tachycardic, 80s-90s  - fluid status:  Euvolemic, not on diuretics       COVID-19 PNA  Presented with dyspnea and fevers. Found to be covid positive (x2). D-Dimer 3.27. Currently on RA.   - Lovenox 0.5mg/kg subcutaneous q12 hours  - S/p COVID-19 monoclonal antibodies x1 --- ok'd and recommended per ID, discussed with pharmacy  - S/p 5 days remdesivir 100mg daily  - CBC with diff trends     Recurrent exudative pleural effusions (6/2021, 7/2021)  KALI Cavitary Lung Lesion  The effusion was negative for fungal/bacterial/AFB cx.  Treated with IV ABx then levaquin.  Urine and serum Blasto and Histo Ag were negative, A galactomannan was negative, and BD glucan was intermediate. Repeat chest CT showed peripheral consolidative opacity in the left upper lobe which appears partially cavitating. Has associated pleuritic chest pain. 1,3 Beta D Glucan and Aspergillus galactomanan negative  - Continue zosyn/micafungin  - Transplant ID following  - Consulted pulmonary for bronchoscopy with BAL. Plans for bronch/BAL on Monday, 8/30. NPO at midnight.   - Low dose PO hydromorphone only for severe breakthrough pain.     EBV viremia  EBV quant <500 this admission.     Gout/pseudogout  Predated transplant.  Has had recurrent flares following transplant, and continues to follow with rheumatology.  He notes that his current flare started in his fingers, and has now progressed up to his elbow.  The pain limits  his activity.  He has been treated with Anakinra, with positive results, but has not been approved for outpatient use by his insurance.  He recently completed a steroid burst per rheum (30mg x 4 days, 20mg x 4 days, 10mg x 4 days, then back to 5mg per transplant protocol), with little effect, so was advised to start a prednisone 40mg x 5 days today -- which he has not yet started.  He was given a one-time dose of hydrocortisone for adrenal insufficiency. He continues to complain of pain.   - S/p Anakinra 100mg SubQ qday for 3 days. Patient denies complaints currently. If further concerns, will formally consult Rheum  - defer PTA allopurinol therapy given active flare     Iron deficiency anemia  Iron sat 8/5 was 13%.  He was started on po iron, but note that absorption is impaired while on po PPI.   - IV Venofer (8/26-8/30)     WALTER on CKD  Creatinine had been stable at 1.4-1.6, but has been elevated to 1.9-2s in the recent weeks.  Likely ddx include tacro therapy and mild hypovolemia.  He received IVF in the ED.  He is not on diuretics.  Will closely monitor tacro levels. Stable around 2, slightly downtrending.   - continue to trend BMP daily     Transaminitis, resolved  Questionable Choledocholithiasis  Abdominal ultrasound 7/14/21 showing hepatomegaly with steatosis and no biliary dilatation.  LFTs stable on admission.   - trend LFTs     Severe protein calorie malnutrition  History of Sylvester En Y gastric bypass  Albumin on arrival 2.6.  - nutrition consult      DMII:  Decrease lantus to 10 units at bedtime (PTA 18units), given AM hypoglycemia, med sliding scale insulin, hypoglycemia protocol.  Holding on 8/29, 8/30 d/t AM hypoglycemia  Depression:  PTA wellbutrin 75mg BID  COPD:  PTA singulair, Trelegy Ellipta, Albuterol     Patient seen and discussed with Dr. Dg Winters  Internal Medicine Resident, PGY-2  Pager: [phone]      Interval History   No events overnight. Feels about the same, but mainly  "nervous about the bronch. Breathing is unchanged, no dyspnea reported. Denies abdominal pain. Endorses looser bowel movements last night.    Physical Exam   Temp: 98  F (36.7  C) Temp src: Oral BP: (!) 133/91 Pulse: 105   Resp: 19 SpO2: 97 % O2 Device: None (Room air)    Vitals:    08/27/21 0600 08/28/21 0500 08/29/21 0412   Weight: 88 kg (194 lb 0.1 oz) 89.5 kg (197 lb 5 oz) 90.7 kg (199 lb 15.3 oz)     Vital Signs with Ranges  Temp:  [97.6  F (36.4  C)-98.3  F (36.8  C)] 98  F (36.7  C)  Pulse:  [] 105  Resp:  [16-20] 19  BP: (115-139)/() 133/91  FiO2 (%):  [30 %] 30 %  SpO2:  [97 %-99 %] 97 %  I/O last 3 completed shifts:  In: 1330 [P.O.:580; I.V.:700; IV Piggyback:50]  Out: 800 [Urine:800]     , Blood pressure (!) 133/91, pulse 105, temperature 98  F (36.7  C), temperature source Oral, resp. rate 19, height 1.727 m (5' 8\"), weight 90.7 kg (199 lb 15.3 oz), SpO2 97 %.  199 lbs 15.32 oz  GEN:  Alert, oriented x 3, appears comfortable, NAD, lying down and resting in bed  HEENT: NC/AT, no scleral icterus  CV:  Regular rate and rhythm, no murmur, JVP 8 cm.  S1 + S2 noted.  LUNGS:  Breathing comfortably. No crackles appreciated.   ABD:  +bowel sounds, soft, non-tender/non-distended.  No rebound/guarding/rigidity.  EXT:  No edema or cyanosis.  Warm.  NEURO: alert and oriented, moving all 4 extremities to command     Medications     ACE/ARB/ARNI NOT PRESCRIBED       BETA BLOCKER NOT PRESCRIBED         amitriptyline  25 mg Oral At Bedtime     aspirin  81 mg Oral Daily     buPROPion  75 mg Oral BID     calcium carbonate 600 mg-vitamin D 400 units  1 tablet Oral BID w/meals     clotrimazole  1 lozenge Buccal 4x Daily     enoxaparin ANTICOAGULANT  0.5 mg/kg Subcutaneous Q12H     fluticasone-vilanterol  1 puff Inhalation Daily     gabapentin  300 mg Oral TID     insulin aspart  1-7 Units Subcutaneous TID AC     insulin aspart  1-5 Units Subcutaneous At Bedtime     [Held by provider] insulin glargine  10 Units " Subcutaneous At Bedtime     iron sucrose (VENOFER) intermittent infusion (200 mg)  200 mg Intravenous Q24H     magnesium sulfate  1 g Intravenous Once     melatonin  10 mg Oral At Bedtime     micafungin  150 mg Intravenous Q24H     montelukast  10 mg Oral At Bedtime     pantoprazole  40 mg Oral QAM AC     piperacillin-tazobactam  3.375 g Intravenous Q6H     potassium chloride  20 mEq Oral Once     predniSONE  5 mg Oral Daily     sodium chloride (PF)  3 mL Intracatheter Q8H     tacrolimus  4 mg Oral BID IS     umeclidinium  1 puff Inhalation Daily       Data   Recent Labs   Lab 08/29/21  1013 08/29/21  0950 08/29/21  0901 08/29/21  0825 08/28/21  0633 08/27/21  0550 08/24/21  1758 08/24/21  0613   WBC  --   --   --  3.6* 2.6* 2.6*   < > 5.1   HGB  --   --   --  7.7* 7.5* 7.1*   < > 8.8*   MCV  --   --   --  89 88 91   < > 90   PLT  --   --   --  253 222 226   < > 295   NA  --   --   --  142 137 135   < > 136   POTASSIUM  --   --   --  3.3* 3.4 3.7   < > 4.1   CHLORIDE  --   --   --  111* 110* 106   < > 106   CO2  --   --   --  26 25 22   < > 26   BUN  --   --   --  16 23 30   < > 32*   CR  --   --   --  1.78* 1.89* 2.04*   < > 1.91*   ANIONGAP  --   --   --  5 2* 7   < > 4   QAMAR  --   --   --  7.9* 7.7* 7.9*   < > 8.0*   * 73 83 50* 64* 134*   < > 192*   ALBUMIN  --   --   --   --  1.9*  --   --  2.6*   PROTTOTAL  --   --   --   --  5.4*  --   --  6.2*   BILITOTAL  --   --   --   --  0.3  --   --  0.6   ALKPHOS  --   --   --   --  77  --   --  122   ALT  --   --   --   --  10  --   --  17   AST  --   --   --   --  11  --   --  10   TROPONIN  --   --   --   --   --   --   --  <0.015    < > = values in this interval not displayed.       No results found for this or any previous visit (from the past 24 hour(s)).

## 2021-08-30 DIAGNOSIS — Z11.59 ENCOUNTER FOR SCREENING FOR OTHER VIRAL DISEASES: ICD-10-CM

## 2021-08-30 LAB
ACANTHOCYTES BLD QL SMEAR: SLIGHT
ANION GAP SERPL CALCULATED.3IONS-SCNC: 4 MMOL/L (ref 3–14)
APPEARANCE FLD: CLEAR
BASOPHILS # BLD MANUAL: 0.1 10E3/UL (ref 0–0.2)
BASOPHILS NFR BLD MANUAL: 3 %
BUN SERPL-MCNC: 12 MG/DL (ref 7–30)
CALCIUM SERPL-MCNC: 7.8 MG/DL (ref 8.5–10.1)
CHLORIDE BLD-SCNC: 108 MMOL/L (ref 94–109)
CO2 SERPL-SCNC: 24 MMOL/L (ref 20–32)
COLOR FLD: COLORLESS
CREAT SERPL-MCNC: 1.57 MG/DL (ref 0.66–1.25)
ELLIPTOCYTES BLD QL SMEAR: SLIGHT
EOSINOPHIL # BLD MANUAL: 0.2 10E3/UL (ref 0–0.7)
EOSINOPHIL NFR BLD MANUAL: 4 %
ERYTHROCYTE [DISTWIDTH] IN BLOOD BY AUTOMATED COUNT: 17.2 % (ref 10–15)
GFR SERPL CREATININE-BSD FRML MDRD: 46 ML/MIN/1.73M2
GLUCOSE BLD-MCNC: 131 MG/DL (ref 70–99)
GLUCOSE BLDC GLUCOMTR-MCNC: 116 MG/DL (ref 70–99)
GLUCOSE BLDC GLUCOMTR-MCNC: 124 MG/DL (ref 70–99)
GLUCOSE BLDC GLUCOMTR-MCNC: 134 MG/DL (ref 70–99)
GLUCOSE BLDC GLUCOMTR-MCNC: 147 MG/DL (ref 70–99)
GLUCOSE BLDC GLUCOMTR-MCNC: 161 MG/DL (ref 70–99)
GLUCOSE BLDC GLUCOMTR-MCNC: 165 MG/DL (ref 70–99)
GLUCOSE BLDC GLUCOMTR-MCNC: 170 MG/DL (ref 70–99)
GLUCOSE BLDC GLUCOMTR-MCNC: 189 MG/DL (ref 70–99)
GRAM STAIN RESULT: NORMAL
GRAM STAIN RESULT: NORMAL
HCT VFR BLD AUTO: 25.2 % (ref 40–53)
HGB BLD-MCNC: 7.4 G/DL (ref 13.3–17.7)
LYMPHOCYTES # BLD MANUAL: 1.2 10E3/UL (ref 0.8–5.3)
LYMPHOCYTES NFR BLD MANUAL: 31 %
LYMPHOCYTES NFR FLD MANUAL: NORMAL %
MAGNESIUM SERPL-MCNC: 2 MG/DL (ref 1.6–2.3)
MCH RBC QN AUTO: 26.5 PG (ref 26.5–33)
MCHC RBC AUTO-ENTMCNC: 29.4 G/DL (ref 31.5–36.5)
MCV RBC AUTO: 90 FL (ref 78–100)
MONOCYTES # BLD MANUAL: 0.6 10E3/UL (ref 0–1.3)
MONOCYTES NFR BLD MANUAL: 15 %
MONOS+MACROS NFR FLD MANUAL: 100 %
NEUTROPHILS # BLD MANUAL: 1.8 10E3/UL (ref 1.6–8.3)
NEUTROPHILS NFR BLD MANUAL: 47 %
NEUTS BAND NFR FLD MANUAL: NORMAL %
PATH REPORT.COMMENTS IMP SPEC: NORMAL
PATH REPORT.COMMENTS IMP SPEC: NORMAL
PATH REPORT.FINAL DX SPEC: NORMAL
PATH REPORT.GROSS SPEC: NORMAL
PATH REPORT.RELEVANT HX SPEC: NORMAL
PLAT MORPH BLD: ABNORMAL
PLATELET # BLD AUTO: 234 10E3/UL (ref 150–450)
POTASSIUM BLD-SCNC: 3.3 MMOL/L (ref 3.4–5.3)
RBC # BLD AUTO: 2.79 10E6/UL (ref 4.4–5.9)
RBC MORPH BLD: ABNORMAL
SCANNED LAB RESULT: NORMAL
SODIUM SERPL-SCNC: 136 MMOL/L (ref 133–144)
TACROLIMUS BLD-MCNC: 11.6 UG/L (ref 5–15)
TME LAST DOSE: NORMAL H
TME LAST DOSE: NORMAL H
WBC # BLD AUTO: 3.8 10E3/UL (ref 4–11)
WBC # FLD AUTO: 3 /UL

## 2021-08-30 PROCEDURE — 87106 FUNGI IDENTIFICATION YEAST: CPT | Performed by: INTERNAL MEDICINE

## 2021-08-30 PROCEDURE — 31624 DX BRONCHOSCOPE/LAVAGE: CPT | Performed by: INTERNAL MEDICINE

## 2021-08-30 PROCEDURE — 250N000011 HC RX IP 250 OP 636: Performed by: INTERNAL MEDICINE

## 2021-08-30 PROCEDURE — 250N000009 HC RX 250: Performed by: INTERNAL MEDICINE

## 2021-08-30 PROCEDURE — 250N000013 HC RX MED GY IP 250 OP 250 PS 637: Performed by: STUDENT IN AN ORGANIZED HEALTH CARE EDUCATION/TRAINING PROGRAM

## 2021-08-30 PROCEDURE — 250N000009 HC RX 250

## 2021-08-30 PROCEDURE — 258N000003 HC RX IP 258 OP 636: Performed by: STUDENT IN AN ORGANIZED HEALTH CARE EDUCATION/TRAINING PROGRAM

## 2021-08-30 PROCEDURE — 31624 DX BRONCHOSCOPE/LAVAGE: CPT | Mod: GC | Performed by: INTERNAL MEDICINE

## 2021-08-30 PROCEDURE — 0B9G8ZZ DRAINAGE OF LEFT UPPER LUNG LOBE, VIA NATURAL OR ARTIFICIAL OPENING ENDOSCOPIC: ICD-10-PCS | Performed by: INTERNAL MEDICINE

## 2021-08-30 PROCEDURE — 36415 COLL VENOUS BLD VENIPUNCTURE: CPT

## 2021-08-30 PROCEDURE — 84999 UNLISTED CHEMISTRY PROCEDURE: CPT | Performed by: INTERNAL MEDICINE

## 2021-08-30 PROCEDURE — 87116 MYCOBACTERIA CULTURE: CPT | Performed by: INTERNAL MEDICINE

## 2021-08-30 PROCEDURE — 89051 BODY FLUID CELL COUNT: CPT | Performed by: INTERNAL MEDICINE

## 2021-08-30 PROCEDURE — 87633 RESP VIRUS 12-25 TARGETS: CPT | Performed by: INTERNAL MEDICINE

## 2021-08-30 PROCEDURE — 80197 ASSAY OF TACROLIMUS: CPT

## 2021-08-30 PROCEDURE — 88108 CYTOPATH CONCENTRATE TECH: CPT | Mod: 26 | Performed by: PATHOLOGY

## 2021-08-30 PROCEDURE — 88312 SPECIAL STAINS GROUP 1: CPT | Mod: TC | Performed by: INTERNAL MEDICINE

## 2021-08-30 PROCEDURE — 250N000011 HC RX IP 250 OP 636: Performed by: STUDENT IN AN ORGANIZED HEALTH CARE EDUCATION/TRAINING PROGRAM

## 2021-08-30 PROCEDURE — 87102 FUNGUS ISOLATION CULTURE: CPT | Performed by: INTERNAL MEDICINE

## 2021-08-30 PROCEDURE — 88312 SPECIAL STAINS GROUP 1: CPT | Mod: 26 | Performed by: PATHOLOGY

## 2021-08-30 PROCEDURE — 80048 BASIC METABOLIC PNL TOTAL CA: CPT | Performed by: NURSE PRACTITIONER

## 2021-08-30 PROCEDURE — 85027 COMPLETE CBC AUTOMATED: CPT | Performed by: NURSE PRACTITIONER

## 2021-08-30 PROCEDURE — 87305 ASPERGILLUS AG IA: CPT | Performed by: INTERNAL MEDICINE

## 2021-08-30 PROCEDURE — 87205 SMEAR GRAM STAIN: CPT | Performed by: INTERNAL MEDICINE

## 2021-08-30 PROCEDURE — 99233 SBSQ HOSP IP/OBS HIGH 50: CPT | Mod: 24 | Performed by: INTERNAL MEDICINE

## 2021-08-30 PROCEDURE — 99232 SBSQ HOSP IP/OBS MODERATE 35: CPT | Mod: 24 | Performed by: INTERNAL MEDICINE

## 2021-08-30 PROCEDURE — 87077 CULTURE AEROBIC IDENTIFY: CPT | Performed by: INTERNAL MEDICINE

## 2021-08-30 PROCEDURE — 87081 CULTURE SCREEN ONLY: CPT | Performed by: INTERNAL MEDICINE

## 2021-08-30 PROCEDURE — 250N000012 HC RX MED GY IP 250 OP 636 PS 637: Performed by: STUDENT IN AN ORGANIZED HEALTH CARE EDUCATION/TRAINING PROGRAM

## 2021-08-30 PROCEDURE — 250N000013 HC RX MED GY IP 250 OP 250 PS 637: Performed by: NURSE PRACTITIONER

## 2021-08-30 PROCEDURE — 87999 UNLISTED MICROBIOLOGY PX: CPT | Performed by: INTERNAL MEDICINE

## 2021-08-30 PROCEDURE — 87798 DETECT AGENT NOS DNA AMP: CPT | Performed by: INTERNAL MEDICINE

## 2021-08-30 PROCEDURE — 250N000011 HC RX IP 250 OP 636: Performed by: NURSE PRACTITIONER

## 2021-08-30 PROCEDURE — 120N000005 HC R&B MS OVERFLOW UMMC

## 2021-08-30 PROCEDURE — 250N000013 HC RX MED GY IP 250 OP 250 PS 637

## 2021-08-30 PROCEDURE — 89050 BODY FLUID CELL COUNT: CPT | Performed by: INTERNAL MEDICINE

## 2021-08-30 PROCEDURE — G0500 MOD SEDAT ENDO SERVICE >5YRS: HCPCS | Performed by: INTERNAL MEDICINE

## 2021-08-30 PROCEDURE — 250N000012 HC RX MED GY IP 250 OP 636 PS 637: Performed by: NURSE PRACTITIONER

## 2021-08-30 PROCEDURE — 87206 SMEAR FLUORESCENT/ACID STAI: CPT | Performed by: INTERNAL MEDICINE

## 2021-08-30 PROCEDURE — 83735 ASSAY OF MAGNESIUM: CPT | Performed by: STUDENT IN AN ORGANIZED HEALTH CARE EDUCATION/TRAINING PROGRAM

## 2021-08-30 RX ORDER — FENTANYL CITRATE 50 UG/ML
INJECTION, SOLUTION INTRAMUSCULAR; INTRAVENOUS PRN
Status: COMPLETED | OUTPATIENT
Start: 2021-08-30 | End: 2021-08-30

## 2021-08-30 RX ORDER — LIDOCAINE HYDROCHLORIDE 40 MG/ML
INJECTION, SOLUTION RETROBULBAR PRN
Status: COMPLETED | OUTPATIENT
Start: 2021-08-30 | End: 2021-08-30

## 2021-08-30 RX ORDER — LIDOCAINE HYDROCHLORIDE 10 MG/ML
INJECTION, SOLUTION INFILTRATION; PERINEURAL PRN
Status: COMPLETED | OUTPATIENT
Start: 2021-08-30 | End: 2021-08-30

## 2021-08-30 RX ORDER — MAGNESIUM HYDROXIDE 1200 MG/15ML
LIQUID ORAL PRN
Status: COMPLETED | OUTPATIENT
Start: 2021-08-30 | End: 2021-08-30

## 2021-08-30 RX ORDER — POTASSIUM CHLORIDE 750 MG/1
40 TABLET, EXTENDED RELEASE ORAL ONCE
Status: COMPLETED | OUTPATIENT
Start: 2021-08-30 | End: 2021-08-30

## 2021-08-30 RX ORDER — LIDOCAINE HYDROCHLORIDE 20 MG/ML
SOLUTION OROPHARYNGEAL PRN
Status: COMPLETED | OUTPATIENT
Start: 2021-08-30 | End: 2021-08-30

## 2021-08-30 RX ADMIN — PREDNISONE 5 MG: 5 TABLET ORAL at 08:21

## 2021-08-30 RX ADMIN — SODIUM CHLORIDE 60 ML: 900 IRRIGANT IRRIGATION at 12:49

## 2021-08-30 RX ADMIN — MIDAZOLAM 1 MG: 1 INJECTION INTRAMUSCULAR; INTRAVENOUS at 12:40

## 2021-08-30 RX ADMIN — MONTELUKAST 10 MG: 10 TABLET, FILM COATED ORAL at 21:50

## 2021-08-30 RX ADMIN — PIPERACILLIN AND TAZOBACTAM 3.38 G: 3; .375 INJECTION, POWDER, LYOPHILIZED, FOR SOLUTION INTRAVENOUS at 21:36

## 2021-08-30 RX ADMIN — CALCIUM CARBONATE 600 MG (1,500 MG)-VITAMIN D3 400 UNIT TABLET 1 TABLET: at 17:19

## 2021-08-30 RX ADMIN — Medication 50 MG: at 21:50

## 2021-08-30 RX ADMIN — GABAPENTIN 300 MG: 300 CAPSULE ORAL at 21:36

## 2021-08-30 RX ADMIN — GABAPENTIN 300 MG: 300 CAPSULE ORAL at 13:57

## 2021-08-30 RX ADMIN — ASPIRIN 81 MG: 81 TABLET, COATED ORAL at 08:21

## 2021-08-30 RX ADMIN — MICAFUNGIN SODIUM 150 MG: 50 INJECTION, POWDER, LYOPHILIZED, FOR SOLUTION INTRAVENOUS at 22:14

## 2021-08-30 RX ADMIN — BUPROPION HYDROCHLORIDE 75 MG: 75 TABLET, FILM COATED ORAL at 21:35

## 2021-08-30 RX ADMIN — TACROLIMUS 4 MG: 1 CAPSULE ORAL at 17:19

## 2021-08-30 RX ADMIN — ENOXAPARIN SODIUM 40 MG: 40 INJECTION SUBCUTANEOUS at 04:08

## 2021-08-30 RX ADMIN — IRON SUCROSE 200 MG: 20 INJECTION, SOLUTION INTRAVENOUS at 17:20

## 2021-08-30 RX ADMIN — LIDOCAINE HYDROCHLORIDE 12 ML: 10 INJECTION, SOLUTION INFILTRATION; PERINEURAL at 12:44

## 2021-08-30 RX ADMIN — PANTOPRAZOLE SODIUM 40 MG: 40 TABLET, DELAYED RELEASE ORAL at 08:21

## 2021-08-30 RX ADMIN — Medication 1 MG: at 21:51

## 2021-08-30 RX ADMIN — LIDOCAINE HYDROCHLORIDE 9 ML: 40 INJECTION, SOLUTION RETROBULBAR; TOPICAL at 12:43

## 2021-08-30 RX ADMIN — CALCIUM CARBONATE 600 MG (1,500 MG)-VITAMIN D3 400 UNIT TABLET 1 TABLET: at 08:21

## 2021-08-30 RX ADMIN — SODIUM CHLORIDE 120 ML: 900 IRRIGANT IRRIGATION at 12:46

## 2021-08-30 RX ADMIN — Medication 2 MG: at 08:33

## 2021-08-30 RX ADMIN — UMECLIDINIUM 1 PUFF: 62.5 AEROSOL, POWDER ORAL at 08:24

## 2021-08-30 RX ADMIN — PIPERACILLIN AND TAZOBACTAM 3.38 G: 3; .375 INJECTION, POWDER, LYOPHILIZED, FOR SOLUTION INTRAVENOUS at 02:25

## 2021-08-30 RX ADMIN — FENTANYL CITRATE 50 MCG: 50 INJECTION, SOLUTION INTRAMUSCULAR; INTRAVENOUS at 12:39

## 2021-08-30 RX ADMIN — ENOXAPARIN SODIUM 40 MG: 40 INJECTION SUBCUTANEOUS at 16:06

## 2021-08-30 RX ADMIN — PIPERACILLIN AND TAZOBACTAM 3.38 G: 3; .375 INJECTION, POWDER, LYOPHILIZED, FOR SOLUTION INTRAVENOUS at 08:22

## 2021-08-30 RX ADMIN — GABAPENTIN 300 MG: 300 CAPSULE ORAL at 08:21

## 2021-08-30 RX ADMIN — BUPROPION HYDROCHLORIDE 75 MG: 75 TABLET, FILM COATED ORAL at 08:21

## 2021-08-30 RX ADMIN — FLUTICASONE FUROATE AND VILANTEROL TRIFENATATE 1 PUFF: 100; 25 POWDER RESPIRATORY (INHALATION) at 08:24

## 2021-08-30 RX ADMIN — PIPERACILLIN AND TAZOBACTAM 3.38 G: 3; .375 INJECTION, POWDER, LYOPHILIZED, FOR SOLUTION INTRAVENOUS at 13:57

## 2021-08-30 RX ADMIN — LIDOCAINE HYDROCHLORIDE 5 ML: 20 SOLUTION ORAL; TOPICAL at 12:38

## 2021-08-30 RX ADMIN — AMITRIPTYLINE HYDROCHLORIDE 25 MG: 25 TABLET, FILM COATED ORAL at 21:50

## 2021-08-30 RX ADMIN — Medication 10 MG: at 21:50

## 2021-08-30 RX ADMIN — POTASSIUM CHLORIDE 40 MEQ: 750 TABLET, EXTENDED RELEASE ORAL at 09:44

## 2021-08-30 RX ADMIN — INSULIN ASPART 1 UNITS: 100 INJECTION, SOLUTION INTRAVENOUS; SUBCUTANEOUS at 17:19

## 2021-08-30 RX ADMIN — TACROLIMUS 4 MG: 1 CAPSULE ORAL at 08:20

## 2021-08-30 ASSESSMENT — ACTIVITIES OF DAILY LIVING (ADL)
ADLS_ACUITY_SCORE: 13

## 2021-08-30 NOTE — OR NURSING
Procedure: Bronchoscopy with BAL (180 mL in, 30 mL returned)  Sedation: Conscious sedation   Specimens: Handled by RT  O2: 2 LPM NC    Patient tolerated procedure well. Patient stable on transfer to . Report called to bedside RN.

## 2021-08-30 NOTE — PLAN OF CARE
"Neuro: A&Ox4.   Cardiac: SR-ST. /85   Pulse 101   Temp 97.5  F (36.4  C) (Oral)   Resp 20   Ht 1.727 m (5' 8\")   Wt 90.7 kg (199 lb 15.3 oz)   SpO2 93%   BMI 30.40 kg/m       Respiratory: Sating  on RA, complains of SOB at times   GI/: Adequate urine output. BM X1 loose   Diet/appetite: Tolerating Reg diet. Poor appetite, needs encouragement   Activity: SBA up in room, independent up to commode  Pain: Complains of back pain, but refused dilaudid  Skin: Generalized bruising   LDA's: PIVx1      Plan: Completed Bronch with lavage today. Continue with POC. Notify primary team with changes.          "

## 2021-08-30 NOTE — PROVIDER NOTIFICATION
This nurse called and spoke to CARDS 2 provider Michelle. Pt has RN managed mag, resulted at 1.5 this AM. Day provider ordered 1g of mag, however per RN managed protocol pt was to receive 4g of mag. Provider gave a verbal order for 3g of mag to be given now so pt gets a total of 4g today. Verbal order was read back and clarified.

## 2021-08-30 NOTE — PLAN OF CARE
Neuro: A&Ox4.   Cardiac: SR. VSS.   Respiratory: Sating adequately on RA. Refused CPAP until 0400 this morning.  GI/: Adequate urine output. BM X1  Diet/appetite: Tolerating regular diet. NPO after midnight in anticipation of bronch today.   Activity:  Up independently in room.  Pain: At acceptable level on current regimen.   Skin: No new deficits noted.  LDA's: PIV    Plan: Continue with POC. Notify primary team with changes. Mag replaced last night.

## 2021-08-30 NOTE — PLAN OF CARE
"Neuro: A&Ox4.   Cardiac: SR-ST. BP (!) 142/98 (BP Location: Left arm)   Pulse 103   Temp 97.4  F (36.3  C) (Oral)   Resp 16   Ht 1.727 m (5' 8\")   Wt 90.7 kg (199 lb 15.3 oz)   SpO2 99%   BMI 30.40 kg/m             Respiratory: Sating  on RA, complains of SOB at times   GI/: Adequate urine output. BM X2 loose   Diet/appetite: Tolerating Reg diet. Poor appetite, needs encouragement   Activity: SBA up in room, independent up to commode  Pain: Complains of back pain, but refused dilaudid  Skin: Generalized bruising   LDA's: PIVx1      Plan: Continue with POC. Notify primary team with changes.         "

## 2021-08-30 NOTE — PROGRESS NOTES
Cardiology Progress Note    Assessment & Plan   Mr. Jim Willingham is a 66yr old male with a history of NICM (s/p HM2 LVAD 6/2017), VT, AFib, CKD, DMII, COPD, now s/p OHT 5/6/21, who presented to the ER with shortness of breath and headache, found to have COVID PNA and KALI cavitary lesion.      Changes Today:  - Continue zosyn/micafungin  - bronch with BAL performed 8/20  - Stop clotrimazole troches, given elevated tacrolimus level   - Patient due for RHC, biopsy. Will order for these to be done while inpatient (orders put in for 8/31)  - Will discuss antibiotic plan with transplant ID and future disposition     NICM, s/p OHT 5/6/21  His post-transplant course has been c/b right pleural air leak (required prolonged chest tube), pAFib, CAP (RLL, 6/2021), recurrent pleural effusions (s/p thoracenteses x2 6/2021 and 7/2021), RLL cavitary lesions (per chest CT 7/14/21, BD glucan indeterminate, aspergillus negative), pericardial effusion (1.4cm per TTE 7/12/21, conservatively managed), low-grade EBV viremia, and recurrent/persistent gout flare.     Rejection history:  none  AlloMap scores:  n/a  DSAs:  none  Coronary angio/Ischemic eval:  Deferred due to renal dysfunction  Last RHC:  8/5/21, mildly elevated biventricular filling pressures with RA 10, mPA 25, PCW 14, and CI 2.95.  Echo/cMRI:  TTE 8/5/21 showed stable graft function, with LVEF 60-65% and normal RV size/function, and trivial loculated pericardial effusion.  -Follow-up repeat echo read  - Patient due for RHC, biopsy. Will order for these to be done while inpatient (orders put in for 8/31)     Serostatus:  - CMV D+/R+  - EBV D+/R+  - Toxo D-/R-     Immunosuppression:  - Continue baseline prednisone 5mg   - Holding MMF given COVID (PTA dose 1500mg twice daily)  - continue tacrolimus, goal level 8-10 (tacro level 9.0 on 8/25).  Continue tacro 4mg twice daily (PTA dose)  - check tacrolimus levels daily. Follow up level     PPx:  - CAV:  Aspirin 81mg daily.  Statin  had been held due to elevated LFTs, defer restarting now.  - GI:  Pantoprazole 40mg daily  - Osteoporosis:  Calcium/vitamin D supplements  - CMV:  Completed 3m Valcyte therapy, level undetectable on admission  - PCP:  Received pentamidine 8/11, due q28 days  - Thrush:  Clotrimazole troches QID -- started 8/5 to augment tacro levels (stopped on 8/29, given elevated tacro level)  - Toxo:  n/a     Graft function:  - BPs:  Controlled  - HRs:  borderline Tachycardic, 80s-90s  - fluid status:  Euvolemic, not on diuretics       COVID-19 PNA  Presented with dyspnea and fevers. Found to be covid positive (x2). D-Dimer 3.27. Currently on RA.   - Lovenox 0.5mg/kg subcutaneous q12 hours  - S/p COVID-19 monoclonal antibodies x1 --- ok'd and recommended per ID, discussed with pharmacy  - S/p 5 days remdesivir 100mg daily  - CBC with diff trends     Recurrent exudative pleural effusions (6/2021, 7/2021)  KALI Cavitary Lung Lesion  The effusion was negative for fungal/bacterial/AFB cx.  Treated with IV ABx then levaquin.  Urine and serum Blasto and Histo Ag were negative, A galactomannan was negative, and BD glucan was intermediate. Repeat chest CT showed peripheral consolidative opacity in the left upper lobe which appears partially cavitating. Has associated pleuritic chest pain. 1,3 Beta D Glucan and Aspergillus galactomanan negative  - Continue zosyn/micafungin  - Transplant ID following  - bronch/BAL performed 8/30 without complications, will await final results   - Low dose PO hydromorphone only for severe breakthrough pain.     EBV viremia  EBV quant <500 this admission.     Gout/pseudogout  Predated transplant.  Has had recurrent flares following transplant, and continues to follow with rheumatology.  He notes that his current flare started in his fingers, and has now progressed up to his elbow.  The pain limits his activity.  He has been treated with Anakinra, with positive results, but has not been approved for outpatient  use by his insurance.  He recently completed a steroid burst per rheum (30mg x 4 days, 20mg x 4 days, 10mg x 4 days, then back to 5mg per transplant protocol), with little effect, so was advised to start a prednisone 40mg x 5 days today -- which he has not yet started.  He was given a one-time dose of hydrocortisone for adrenal insufficiency. He continues to complain of pain.   - S/p Anakinra 100mg SubQ qday for 3 days. Patient denies complaints currently. If further concerns, will formally consult Rheum  - defer PTA allopurinol therapy given active flare     Iron deficiency anemia  Iron sat 8/5 was 13%.  He was started on po iron, but note that absorption is impaired while on po PPI.   - IV Venofer (8/26-8/30)     WALTER on CKD  Creatinine had been stable at 1.4-1.6, but has been elevated to 1.9-2s in the recent weeks.  Likely ddx include tacro therapy and mild hypovolemia.  He received IVF in the ED.  He is not on diuretics.  Will closely monitor tacro levels. Stable around 2, slightly downtrending.   - continue to trend BMP daily     Transaminitis, resolved  Questionable Choledocholithiasis  Abdominal ultrasound 7/14/21 showing hepatomegaly with steatosis and no biliary dilatation.  LFTs stable on admission.   - trend LFTs     Severe protein calorie malnutrition  History of Sylvester En Y gastric bypass  Albumin on arrival 2.6.  - nutrition consult      DMII:  Decrease lantus to 10 units at bedtime (PTA 18units), given AM hypoglycemia, med sliding scale insulin, hypoglycemia protocol.  Holding on 8/29, 8/30 d/t AM hypoglycemia  Depression:  PTA wellbutrin 75mg BID  COPD:  PTA singulair, Trelegy Ellipta, Albuterol     Patient seen and discussed with Dr. Dg Downing  Internal Medicine Resident, PGY-1  Pager: [phone]      Interval History   No events overnight. Feels similar to previous day except might have some more pain with deep breathing, otherwise no new chest pains or troubles with  "breathing.    Physical Exam   Temp: 97.5  F (36.4  C) Temp src: Oral BP: 113/79 Pulse: 105   Resp: 20 SpO2: 93 % O2 Device: None (Room air)    Vitals:    08/27/21 0600 08/28/21 0500 08/29/21 0412   Weight: 88 kg (194 lb 0.1 oz) 89.5 kg (197 lb 5 oz) 90.7 kg (199 lb 15.3 oz)     Vital Signs with Ranges  Temp:  [97.4  F (36.3  C)-98.2  F (36.8  C)] 97.5  F (36.4  C)  Pulse:  [] 105  Resp:  [11-55] 20  BP: (104-156)/() 113/79  FiO2 (%):  [30 %] 30 %  SpO2:  [93 %-100 %] 93 %  I/O last 3 completed shifts:  In: 800 [P.O.:600; I.V.:200]  Out: 775 [Urine:775]     , Blood pressure 113/79, pulse 105, temperature 97.5  F (36.4  C), temperature source Oral, resp. rate 20, height 1.727 m (5' 8\"), weight 90.7 kg (199 lb 15.3 oz), SpO2 93 %.  199 lbs 15.32 oz  GEN:  Alert, oriented x 3, appears comfortable, NAD, lying down and resting in bed  HEENT: NC/AT, no scleral icterus  CV:  Regular rate and rhythm, no murmur, JVP 8 cm.  S1 + S2 noted.  LUNGS:  Breathing comfortably. No crackles appreciated.   ABD:  +bowel sounds, soft, non-tender/non-distended.  No rebound/guarding/rigidity.  EXT:  No edema or cyanosis.  Warm.  NEURO: alert and oriented, moving all 4 extremities to command     Medications     ACE/ARB/ARNI NOT PRESCRIBED       BETA BLOCKER NOT PRESCRIBED         amitriptyline  25 mg Oral At Bedtime     aspirin  81 mg Oral Daily     buPROPion  75 mg Oral BID     calcium carbonate 600 mg-vitamin D 400 units  1 tablet Oral BID w/meals     enoxaparin ANTICOAGULANT  0.5 mg/kg Subcutaneous Q12H     fluticasone-vilanterol  1 puff Inhalation Daily     gabapentin  300 mg Oral TID     insulin aspart  1-7 Units Subcutaneous TID AC     insulin aspart  1-5 Units Subcutaneous At Bedtime     [Held by provider] insulin glargine  10 Units Subcutaneous At Bedtime     iron sucrose (VENOFER) intermittent infusion (200 mg)  200 mg Intravenous Q24H     melatonin  10 mg Oral At Bedtime     micafungin  150 mg Intravenous Q24H     " montelukast  10 mg Oral At Bedtime     pantoprazole  40 mg Oral QAM AC     piperacillin-tazobactam  3.375 g Intravenous Q6H     predniSONE  5 mg Oral Daily     sodium chloride (PF)  3 mL Intracatheter Q8H     tacrolimus  4 mg Oral BID IS     umeclidinium  1 puff Inhalation Daily       Data   Recent Labs   Lab 08/30/21  1045 08/30/21  0752 08/30/21  0627 08/29/21  0825 08/28/21  0633 08/24/21  1758 08/24/21  0613   WBC  --   --  3.8* 3.6* 2.6*   < > 5.1   HGB  --   --  7.4* 7.7* 7.5*   < > 8.8*   MCV  --   --  90 89 88   < > 90   PLT  --   --  234 253 222   < > 295   NA  --   --  136 142 137   < > 136   POTASSIUM  --   --  3.3* 3.3* 3.4   < > 4.1   CHLORIDE  --   --  108 111* 110*   < > 106   CO2  --   --  24 26 25   < > 26   BUN  --   --  12 16 23   < > 32*   CR  --   --  1.57* 1.78* 1.89*   < > 1.91*   ANIONGAP  --   --  4 5 2*   < > 4   QAMAR  --   --  7.8* 7.9* 7.7*   < > 8.0*   * 124* 131* 50* 64*   < > 192*   ALBUMIN  --   --   --   --  1.9*  --  2.6*   PROTTOTAL  --   --   --   --  5.4*  --  6.2*   BILITOTAL  --   --   --   --  0.3  --  0.6   ALKPHOS  --   --   --   --  77  --  122   ALT  --   --   --   --  10  --  17   AST  --   --   --   --  11  --  10   TROPONIN  --   --   --   --   --   --  <0.015    < > = values in this interval not displayed.       No results found for this or any previous visit (from the past 24 hour(s)).

## 2021-08-31 LAB
ANION GAP SERPL CALCULATED.3IONS-SCNC: 6 MMOL/L (ref 3–14)
BASOPHILS # BLD MANUAL: 0 10E3/UL (ref 0–0.2)
BASOPHILS NFR BLD MANUAL: 1 %
BITE CELLS BLD QL SMEAR: SLIGHT
BUN SERPL-MCNC: 10 MG/DL (ref 7–30)
BURR CELLS BLD QL SMEAR: SLIGHT
CALCIUM SERPL-MCNC: 7.8 MG/DL (ref 8.5–10.1)
CHLORIDE BLD-SCNC: 108 MMOL/L (ref 94–109)
CMV DNA SPEC NAA+PROBE-ACNC: NOT DETECTED IU/ML
CO2 SERPL-SCNC: 24 MMOL/L (ref 20–32)
CREAT SERPL-MCNC: 1.65 MG/DL (ref 0.66–1.25)
EOSINOPHIL # BLD MANUAL: 0.2 10E3/UL (ref 0–0.7)
EOSINOPHIL NFR BLD MANUAL: 4 %
ERYTHROCYTE [DISTWIDTH] IN BLOOD BY AUTOMATED COUNT: 17.5 % (ref 10–15)
FLUAV H1 2009 PAND RNA SPEC QL NAA+PROBE: NEGATIVE
FLUAV H1 RNA SPEC QL NAA+PROBE: NEGATIVE
FLUAV H3 RNA SPEC QL NAA+PROBE: NEGATIVE
FLUAV RNA SPEC QL NAA+PROBE: NEGATIVE
FLUBV RNA SPEC QL NAA+PROBE: NEGATIVE
GFR SERPL CREATININE-BSD FRML MDRD: 43 ML/MIN/1.73M2
GLUCOSE BLD-MCNC: 116 MG/DL (ref 70–99)
GLUCOSE BLDC GLUCOMTR-MCNC: 111 MG/DL (ref 70–99)
GLUCOSE BLDC GLUCOMTR-MCNC: 111 MG/DL (ref 70–99)
GLUCOSE BLDC GLUCOMTR-MCNC: 142 MG/DL (ref 70–99)
GLUCOSE BLDC GLUCOMTR-MCNC: 157 MG/DL (ref 70–99)
GLUCOSE BLDC GLUCOMTR-MCNC: 200 MG/DL (ref 70–99)
HADV DNA SPEC QL NAA+PROBE: NEGATIVE
HADV DNA SPEC QL NAA+PROBE: NEGATIVE
HCT VFR BLD AUTO: 28 % (ref 40–53)
HGB BLD-MCNC: 8.3 G/DL (ref 13.3–17.7)
HGB BLD-MCNC: 8.5 G/DL (ref 13.3–17.7)
HMPV RNA SPEC QL NAA+PROBE: NEGATIVE
HPIV1 RNA SPEC QL NAA+PROBE: NEGATIVE
HPIV2 RNA SPEC QL NAA+PROBE: NEGATIVE
HPIV3 RNA SPEC QL NAA+PROBE: NEGATIVE
LYMPHOCYTES # BLD MANUAL: 1.7 10E3/UL (ref 0.8–5.3)
LYMPHOCYTES NFR BLD MANUAL: 35 %
MAGNESIUM SERPL-MCNC: 1.9 MG/DL (ref 1.6–2.3)
MCH RBC QN AUTO: 27.2 PG (ref 26.5–33)
MCHC RBC AUTO-ENTMCNC: 29.6 G/DL (ref 31.5–36.5)
MCV RBC AUTO: 92 FL (ref 78–100)
METAMYELOCYTES # BLD MANUAL: 0 10E3/UL
METAMYELOCYTES NFR BLD MANUAL: 1 %
MONOCYTES # BLD MANUAL: 0.9 10E3/UL (ref 0–1.3)
MONOCYTES NFR BLD MANUAL: 19 %
MYELOCYTES # BLD MANUAL: 0.2 10E3/UL
MYELOCYTES NFR BLD MANUAL: 5 %
NEUTROPHILS # BLD MANUAL: 1.7 10E3/UL (ref 1.6–8.3)
NEUTROPHILS NFR BLD MANUAL: 35 %
NRBC # BLD AUTO: 0.1 10E3/UL
NRBC BLD MANUAL-RTO: 2 %
OXYHGB MFR BLDV: 53 % (ref 92–100)
PLAT MORPH BLD: ABNORMAL
PLATELET # BLD AUTO: 275 10E3/UL (ref 150–450)
POTASSIUM BLD-SCNC: 4 MMOL/L (ref 3.4–5.3)
RBC # BLD AUTO: 3.05 10E6/UL (ref 4.4–5.9)
RBC MORPH BLD: ABNORMAL
RHINOVIRUS RNA SPEC QL NAA+PROBE: NEGATIVE
RSV RNA SPEC QL NAA+PROBE: NEGATIVE
RSV RNA SPEC QL NAA+PROBE: NEGATIVE
SODIUM SERPL-SCNC: 138 MMOL/L (ref 133–144)
TACROLIMUS BLD-MCNC: 9.5 UG/L (ref 5–15)
TME LAST DOSE: NORMAL H
TME LAST DOSE: NORMAL H
WBC # BLD AUTO: 4.9 10E3/UL (ref 4–11)

## 2021-08-31 PROCEDURE — 999N000007 HC SITE CHECK

## 2021-08-31 PROCEDURE — 250N000011 HC RX IP 250 OP 636: Performed by: INTERNAL MEDICINE

## 2021-08-31 PROCEDURE — 85018 HEMOGLOBIN: CPT

## 2021-08-31 PROCEDURE — 93010 ELECTROCARDIOGRAM REPORT: CPT | Performed by: INTERNAL MEDICINE

## 2021-08-31 PROCEDURE — C1887 CATHETER, GUIDING: HCPCS | Performed by: INTERNAL MEDICINE

## 2021-08-31 PROCEDURE — 82810 BLOOD GASES O2 SAT ONLY: CPT

## 2021-08-31 PROCEDURE — 250N000012 HC RX MED GY IP 250 OP 636 PS 637: Performed by: NURSE PRACTITIONER

## 2021-08-31 PROCEDURE — 36415 COLL VENOUS BLD VENIPUNCTURE: CPT | Performed by: NURSE PRACTITIONER

## 2021-08-31 PROCEDURE — 4A023N6 MEASUREMENT OF CARDIAC SAMPLING AND PRESSURE, RIGHT HEART, PERCUTANEOUS APPROACH: ICD-10-PCS | Performed by: INTERNAL MEDICINE

## 2021-08-31 PROCEDURE — 99233 SBSQ HOSP IP/OBS HIGH 50: CPT | Mod: 24 | Performed by: INTERNAL MEDICINE

## 2021-08-31 PROCEDURE — 80197 ASSAY OF TACROLIMUS: CPT

## 2021-08-31 PROCEDURE — 272N000002 HC OR SUPPLY OTHER OPNP: Performed by: INTERNAL MEDICINE

## 2021-08-31 PROCEDURE — 272N000001 HC OR GENERAL SUPPLY STERILE: Performed by: INTERNAL MEDICINE

## 2021-08-31 PROCEDURE — 250N000009 HC RX 250: Performed by: INTERNAL MEDICINE

## 2021-08-31 PROCEDURE — 250N000013 HC RX MED GY IP 250 OP 250 PS 637: Performed by: NURSE PRACTITIONER

## 2021-08-31 PROCEDURE — 02BM3ZX EXCISION OF VENTRICULAR SEPTUM, PERCUTANEOUS APPROACH, DIAGNOSTIC: ICD-10-PCS | Performed by: INTERNAL MEDICINE

## 2021-08-31 PROCEDURE — 250N000013 HC RX MED GY IP 250 OP 250 PS 637: Performed by: STUDENT IN AN ORGANIZED HEALTH CARE EDUCATION/TRAINING PROGRAM

## 2021-08-31 PROCEDURE — 120N000005 HC R&B MS OVERFLOW UMMC

## 2021-08-31 PROCEDURE — 99232 SBSQ HOSP IP/OBS MODERATE 35: CPT | Mod: 24 | Performed by: INTERNAL MEDICINE

## 2021-08-31 PROCEDURE — C1894 INTRO/SHEATH, NON-LASER: HCPCS | Performed by: INTERNAL MEDICINE

## 2021-08-31 PROCEDURE — 82374 ASSAY BLOOD CARBON DIOXIDE: CPT | Performed by: NURSE PRACTITIONER

## 2021-08-31 PROCEDURE — 250N000013 HC RX MED GY IP 250 OP 250 PS 637: Performed by: INTERNAL MEDICINE

## 2021-08-31 PROCEDURE — 93505 ENDOMYOCARDIAL BIOPSY: CPT | Performed by: INTERNAL MEDICINE

## 2021-08-31 PROCEDURE — 250N000012 HC RX MED GY IP 250 OP 636 PS 637: Performed by: STUDENT IN AN ORGANIZED HEALTH CARE EDUCATION/TRAINING PROGRAM

## 2021-08-31 PROCEDURE — 83735 ASSAY OF MAGNESIUM: CPT | Performed by: INTERNAL MEDICINE

## 2021-08-31 PROCEDURE — 88346 IMFLUOR 1ST 1ANTB STAIN PX: CPT | Mod: TC | Performed by: INTERNAL MEDICINE

## 2021-08-31 PROCEDURE — 999N000128 HC STATISTIC PERIPHERAL IV START W/O US GUIDANCE

## 2021-08-31 PROCEDURE — 85027 COMPLETE CBC AUTOMATED: CPT | Performed by: NURSE PRACTITIONER

## 2021-08-31 PROCEDURE — 93005 ELECTROCARDIOGRAM TRACING: CPT

## 2021-08-31 RX ORDER — LIDOCAINE 40 MG/G
CREAM TOPICAL
Status: DISCONTINUED | OUTPATIENT
Start: 2021-08-31 | End: 2021-08-31 | Stop reason: HOSPADM

## 2021-08-31 RX ORDER — MULTIPLE VITAMINS W/ MINERALS TAB 9MG-400MCG
1 TAB ORAL DAILY
Status: DISCONTINUED | OUTPATIENT
Start: 2021-08-31 | End: 2021-09-02 | Stop reason: HOSPADM

## 2021-08-31 RX ADMIN — ALBUTEROL SULFATE 2 PUFF: 90 AEROSOL, METERED RESPIRATORY (INHALATION) at 16:18

## 2021-08-31 RX ADMIN — CALCIUM CARBONATE 600 MG (1,500 MG)-VITAMIN D3 400 UNIT TABLET 1 TABLET: at 08:29

## 2021-08-31 RX ADMIN — ASPIRIN 81 MG: 81 TABLET, COATED ORAL at 08:29

## 2021-08-31 RX ADMIN — PREDNISONE 5 MG: 5 TABLET ORAL at 08:29

## 2021-08-31 RX ADMIN — BUPROPION HYDROCHLORIDE 75 MG: 75 TABLET, FILM COATED ORAL at 08:29

## 2021-08-31 RX ADMIN — GABAPENTIN 300 MG: 300 CAPSULE ORAL at 13:32

## 2021-08-31 RX ADMIN — TACROLIMUS 4 MG: 1 CAPSULE ORAL at 08:29

## 2021-08-31 RX ADMIN — UMECLIDINIUM 1 PUFF: 62.5 AEROSOL, POWDER ORAL at 08:29

## 2021-08-31 RX ADMIN — GABAPENTIN 300 MG: 300 CAPSULE ORAL at 08:29

## 2021-08-31 RX ADMIN — MULTIPLE VITAMINS W/ MINERALS TAB 1 TABLET: TAB at 12:25

## 2021-08-31 RX ADMIN — FLUTICASONE FUROATE AND VILANTEROL TRIFENATATE 1 PUFF: 100; 25 POWDER RESPIRATORY (INHALATION) at 08:30

## 2021-08-31 RX ADMIN — GABAPENTIN 300 MG: 300 CAPSULE ORAL at 20:08

## 2021-08-31 RX ADMIN — Medication 10 MG: at 22:00

## 2021-08-31 RX ADMIN — AMITRIPTYLINE HYDROCHLORIDE 25 MG: 25 TABLET, FILM COATED ORAL at 22:00

## 2021-08-31 RX ADMIN — BUPROPION HYDROCHLORIDE 75 MG: 75 TABLET, FILM COATED ORAL at 20:08

## 2021-08-31 RX ADMIN — PIPERACILLIN AND TAZOBACTAM 3.38 G: 3; .375 INJECTION, POWDER, LYOPHILIZED, FOR SOLUTION INTRAVENOUS at 08:28

## 2021-08-31 RX ADMIN — CALCIUM CARBONATE 600 MG (1,500 MG)-VITAMIN D3 400 UNIT TABLET 1 TABLET: at 18:13

## 2021-08-31 RX ADMIN — PIPERACILLIN AND TAZOBACTAM 3.38 G: 3; .375 INJECTION, POWDER, LYOPHILIZED, FOR SOLUTION INTRAVENOUS at 20:07

## 2021-08-31 RX ADMIN — PIPERACILLIN AND TAZOBACTAM 3.38 G: 3; .375 INJECTION, POWDER, LYOPHILIZED, FOR SOLUTION INTRAVENOUS at 13:32

## 2021-08-31 RX ADMIN — TACROLIMUS 4 MG: 1 CAPSULE ORAL at 18:13

## 2021-08-31 RX ADMIN — INSULIN ASPART 1 UNITS: 100 INJECTION, SOLUTION INTRAVENOUS; SUBCUTANEOUS at 16:20

## 2021-08-31 RX ADMIN — MONTELUKAST 10 MG: 10 TABLET, FILM COATED ORAL at 22:00

## 2021-08-31 RX ADMIN — PIPERACILLIN AND TAZOBACTAM 3.38 G: 3; .375 INJECTION, POWDER, LYOPHILIZED, FOR SOLUTION INTRAVENOUS at 02:57

## 2021-08-31 RX ADMIN — PANTOPRAZOLE SODIUM 40 MG: 40 TABLET, DELAYED RELEASE ORAL at 08:29

## 2021-08-31 ASSESSMENT — MIFFLIN-ST. JEOR: SCORE: 1651.5

## 2021-08-31 ASSESSMENT — ACTIVITIES OF DAILY LIVING (ADL)
ADLS_ACUITY_SCORE: 13

## 2021-08-31 NOTE — PROGRESS NOTES
[facility]  Transplant Infectious Disease Progress Note     Patient:  Jim Willingham, Date of birth 1955, Medical record number [id number]  Date of Visit:  08/30/2021         Assessment and Recommendations:   66 year old male with NICMP s/p LVAD in 2017 and OHT in 5/2021 who presented with COVID 19 and KALI cavitary lesion.     1. Mild to moderate COVID pneumonia + KALI cavitary consolidation: tested positive on 8/24. Received 5 days of remdesivir. CT chest showed KALI consolidative opacity w/ partial cavitation and multiple pulmonary nodules. Previous RLL cavitary lesion resolved. Serum Asp gm and 1,3 BD glucan, CRAG, MRSA nares negative. Underwent bronchoscopy today. Pending AFB, aerobic culture, fungal culture, nocardia culture, Legionella, respiratory PCR, CMV PCR. Actinomyces culture, PJP, Asp gm. Cytology did not reveal fungal, viral or Pneumocystis effects seen. COVID 19 can be associated w/ Aspergillus or other fungal infections so attempting to rule this out. Blood cultures have been negative. 8/28 w/o significant valvular abnormalities making septic emboli less likely.  If cultures are negative at 2-3 days will send for 16 and 28S.     2. Leukopenia: Likely multifactorial. ANC okay.    Previous ID problems:  1. RUPINDER hominis BSI in 9/2020 treated with vancomycin for 6 weeks, presumed to be LVAD related.  2. MRSE in a hand joint in broth only deemed a contaminant on 7/8/2021 with inflamed joint due to acute pseudogout.   3. RLL pneumonia with exudative pleural effusion in 7/2021. The effusion was negative for fungal/bacterial/AFB cx. Treated with IV ABx then levaquin. Urine and serum Blasto and Histo Ag were negative, A galactomannan was negative for BD glucan was intermediate. No antifungal therapy was given with repeat CT 7/23/2021 showed improvement.    Other Infectious Diseases Concerns Include:  - QTc interval: QTc 420  - Bacterial prophylaxis: on treatment  - PCP  prophylaxis:pentamidine 8/11  - Viral serostatus & prophylaxis:CMV D+/R+, EBV D+/R+, HSV1+/2-, VZV +, Toxo D-/R-  - Fungal prophylaxis: on treatment  - Immunization status: when more stable, candidate for prevnar then the pneumovax vaccines, shingrix vaccine and the COVID-19 vaccine but when he is not on such a high dose of prednisone.    - Immunoglobulin status: IgG 838  - Isolation status: COVID special precautions  - Immunosuppression: Basiliximab for induction. TAC/MMF (held)/pred    Recommendations:  1. Bronchoscopy 8/30 pending-will tailor antibiotics based on results  2. Continue pip/tazo 3.375 mg IV q 6 hours and micafungin 150 mg for now    Transplant Infectious Disease will continue to follow with you.      Kateryna Londono DO.   Pager [phone]        Interval History:   Since was last seen by ID, 8/27/21. Today is my first day seeing this hospital stay. Bronchoscopy performed today. Started to have a productive cough w/ brown/green sputum. Denies fevers or chills.       Transplants:  5/6/2021 (Heart), Postoperative day:  116.  Coordinator Yolette Rueda    Review of Systems:Remaining systems all reviewed     Antimicrobial History  Micafungin 8/25-C  Pip/tazo 8/25-C  Remdesivir 8/24-8/28  Vancomycin 8/25    Immunosuppression prednisone 5, tacrolimus         Current Medications & Allergies:       amitriptyline  25 mg Oral At Bedtime     aspirin  81 mg Oral Daily     buPROPion  75 mg Oral BID     calcium carbonate 600 mg-vitamin D 400 units  1 tablet Oral BID w/meals     fluticasone-vilanterol  1 puff Inhalation Daily     gabapentin  300 mg Oral TID     insulin aspart  1-7 Units Subcutaneous TID AC     insulin aspart  1-5 Units Subcutaneous At Bedtime     [Held by provider] insulin glargine  10 Units Subcutaneous At Bedtime     melatonin  10 mg Oral At Bedtime     micafungin  150 mg Intravenous Q24H     montelukast  10 mg Oral At Bedtime     pantoprazole  40 mg Oral QAM AC     piperacillin-tazobactam   3.375 g Intravenous Q6H     predniSONE  5 mg Oral Daily     sodium chloride (PF)  3 mL Intracatheter Q8H     tacrolimus  4 mg Oral BID IS     umeclidinium  1 puff Inhalation Daily       Infusions/Drips:    ACE/ARB/ARNI NOT PRESCRIBED       BETA BLOCKER NOT PRESCRIBED         Allergies   Allergen Reactions     Grass Shortness Of Breath     Ace Inhibitors Cough     Dust Mites Other (See Comments)     Asthma     Mold Other (See Comments)     Asthma     Penicillins Other (See Comments)     Unknown - childhood exposure    Tolerated Zosyn 9/18-9/20/2020     Sulfa Drugs Other (See Comments) and Unknown     Unknown childhood reaction            Physical Exam:   Ranges for vital signs:  Temp:  [97.4  F (36.3  C)-97.8  F (36.6  C)] 97.8  F (36.6  C)  Pulse:  [] 96  Resp:  [11-55] 16  BP: (104-144)/() 131/101  FiO2 (%):  [30 %] 30 %  SpO2:  [93 %-100 %] 95 %  Vitals:    08/27/21 0600 08/28/21 0500 08/29/21 0412   Weight: 88 kg (194 lb 0.1 oz) 89.5 kg (197 lb 5 oz) 90.7 kg (199 lb 15.3 oz)       Physical Examination:  GENERAL:in bed. in no acute distress.  HEAD:  Head is normocephalic, atraumatic   EYES:  Eyes have anicteric sclerae without conjunctival injection. Pupils reactive bilaterally.   ENT:  Oropharynx is moist without exudates or ulcers. No sinus tenderness.   LUNGS:  Clear but diminished to auscultation bilaterally. No accessory muscle use. Not on oxygen.   CARDIOVASCULAR:  Regular rate and rhythm with no murmurs  ABDOMEN:  Normal bowel sounds, soft, non-tender, non-distended.   SKIN:  No acute rashes.    EXTREMITIES: No joint erythema or swelling.   NEUROLOGIC:  Grossly nonfocal. Active x4 extremities. Alert. Oriented.          Laboratory Data:     Metabolic Studies       Recent Labs   Lab Test 08/30/21  1843 08/30/21  0627 08/29/21  0825 08/25/21  1645 08/25/21  1427 08/24/21  1758 08/24/21  0613 08/24/21  0559 08/23/21  0918 08/05/21  0931 07/09/21  0632 07/08/21  1533 06/24/21  0905 06/21/21  0933  06/07/21  0807 05/05/21 0628 05/04/21  2313   NA  --  136 142   < >  --    < > 136  --   --  137   < > 134  --  131* 140  --  135   POTASSIUM  --  3.3* 3.3*   < >  --    < > 4.1  --   --  4.3   < > 4.6  --  4.8 4.2  --  4.0   CHLORIDE  --  108 111*   < >  --    < > 106  --   --  107   < > 104  --  97 105  --  101   CO2  --  24 26   < >  --    < > 26  --   --  24   < > 22  --  26 30  --  26   ANIONGAP  --  4 5   < >  --    < > 4  --   --  6   < > 7  --  8 5  --  7   BUN  --  12 16   < >  --    < > 32*  --   --  27   < > 37*  --  46* 41*  --  50*   CR  --  1.57* 1.78*   < >  --    < > 1.91*  --   --  1.45*   < > 2.37*  --  2.31* 1.93*  --  1.60*   GFRESTIMATED  --  46* 39*   < >  --    < > 36*  --   --  51*   < > 28*  --  29* 36*  --  45*   * 131* 50*   < >  --    < > 192*  --    < > 85   < > 133*  --  285* 128*  --  115*   A1C  --   --   --   --   --   --   --   --   --   --   --   --   --   --   --   --  5.1   QAMAR  --  7.8* 7.9*   < >  --    < > 8.0*  --   --  8.2*   < > 7.9*  --  8.6 8.2*  --  9.1   PHOS  --   --   --   --   --   --   --   --   --  2.4*  --   --   --  3.7 3.7   < > 3.8   MAG  --  2.0 1.5*   < >  --    < > 1.5*  --   --  1.8   < >  --   --  1.9 1.7  --  2.3   LACT  --   --   --   --   --   --   --  0.8  --   --   --  1.0   < >  --   --    < >  --    PCAL  --   --   --   --   --   --  0.16  --   --   --   --  0.18   < >  --   --    < >  --    FGTL  --   --   --   --  <31  --   --   --   --   --    < >  --    < >  --   --    < >  --    CKT  --   --   --   --   --   --   --   --   --   --   --   --   --  31 49  --   --     < > = values in this interval not displayed.       Hepatic Studies    Recent Labs   Lab Test 08/28/21  0633 08/24/21  0613 08/05/21  0931 07/13/21  2053 07/10/21  0624   BILITOTAL 0.3 0.6 0.5  --   --    DBIL 0.1  --   --   --   --    ALKPHOS 77 122 281*  --   --    PROTTOTAL 5.4* 6.2* 6.4*  --  5.3*   ALBUMIN 1.9* 2.6* 2.9*  --   --    AST 11 10 12  --   --    ALT 10 17 36   --   --    LDH  --   --   --  252* 220       Hematology Studies      Recent Labs   Lab Test 08/30/21  0627 08/29/21  0825 08/28/21  0633 08/27/21  0550 08/27/21  0550 08/26/21  0539 08/25/21  0542   WBC 3.8* 3.6* 2.6*  --  2.6* 4.5 7.0   ANEU 1.8 1.7  --   --  1.3* 3.3 5.7   ALYM 1.2 0.9  --   --  0.7* 0.7* 0.6*   VIJAY 0.6 0.8  --   --  0.4 0.4 0.6   AEOS 0.2 0.1  --   --  0.1 0.0 0.0   HGB 7.4* 7.7* 7.5*   < > 7.1* 7.3* 7.9*   HCT 25.2* 25.3* 24.9*   < > 23.7* 24.3* 26.5*    253 222   < > 226 233 252    < > = values in this interval not displayed.       Arterial Blood Gas Testing    Recent Labs   Lab Test 08/24/21  1142 08/24/21  0559 06/28/21  2247 05/09/21  0956 05/09/21  0902 05/07/21  1900 05/07/21  1700 05/07/21  1420 05/07/21  0340 05/06/21  2152   PH  --  7.40  --   --   --  7.40 7.38 7.39 7.38 7.37   PCO2  --   --   --   --   --  38 41 40 41 44   PO2  --   --   --   --   --  101 102 130* 153* 148*   HCO3  --   --   --   --   --  23 24 24 24 25   O2PER 99  --  21.0 21  21 REPORT AMENDED: 2LNC Canceled, Test credited  2L 40 40.0  40.0 40  40        Medication levels    Recent Labs   Lab Test 08/30/21  0627 05/13/21  0534 05/08/21 2004   VANCOMYCIN  --   --  18.5   TACROL 11.6   < >  --     < > = values in this interval not displayed.       Inflammatory Markers    Recent Labs   Lab Test 08/27/21  0550 07/19/21  0630 07/18/21  0552 07/16/21  0614 07/09/21  0632 07/08/21  1746 07/08/21  1533 10/27/20  1250 10/20/20  1305 10/13/20  1320 10/06/20  1320 09/30/20  1130   SED  --   --   --   --   --  72*  --  10 8 10 20 9   .0* 13.0* 7.7 4.2 98.0*  --  87.0* 3.5 11.0* 12.0* 5.7 0.6*       Immune Globulin Studies     Recent Labs   Lab Test 12/21/20  1133      IGM 55          Pancreatitis testing    Recent Labs   Lab Test 05/26/21  1340 05/04/21  2313 11/05/20  0907 08/05/20  0335   AMYLASE 49 93  --   --    LIPASE 25*  --   --  168   TRIG  --   --  91  --        Gout Labs      Recent  Labs   Lab Test 08/24/21  0613 03/12/21  0605 02/01/21  1127 01/09/21  0538 12/21/20  1133   URIC 5.3 4.8 3.9 5.0 4.5       Clotting Studies    Recent Labs   Lab Test 07/19/21  0630 07/18/21  0552 07/17/21  0626 07/16/21  0614 05/07/21  0340 05/06/21  0650   INR 1.16* 1.11 1.12 1.07   < > 1.45*   PTT  --   --   --   --   --  35    < > = values in this interval not displayed.       Iron Testing    Recent Labs   Lab Test 08/30/21  0627 08/05/21  0931 05/30/21  0526 05/29/21  0606 05/13/21  0534 02/09/21  0518   IRON  --  30*  --   --  38 28*   FEB  --  240  --   --  215* 285   IRONSAT  --  13*  --   --  18 10*   VITA  --  98  --   --  98 33   MCV 90 95   < > 96 94  --    FOLIC  --   --   --   --  14.4  --    B12  --   --   --   --  734  --    HAPT  --   --   --  110  --   --     < > = values in this interval not displayed.       Thyroid Studies     Recent Labs   Lab Test 07/09/21  0632 02/01/21  1127 12/18/20  1018 06/24/20  0747 07/31/19  1050   TSH 3.16 3.52 3.45 1.30 1.23       Body fluid stats    Recent Labs   Lab Test 07/11/21  1539 07/10/21  1507 07/08/21  2114 06/29/21  1710   FTYP  --  Pleural fluid Canceled, Test credited Pleural fluid   FCOL Red* Red  --  Bloody   FAPR Clear Cloudy  --  Turbid   FWBC 330 384  --  770   FNEU 10 52  --  42   FLYM 81 28  --  42   FMONO 9 20  --  16   FTP  --  2.7  --  2.9   GS  --   --  No organisms seen  Many  WBC'S seen  predominantly PMN's   No organisms seen  Few  WBC'S seen    Many  Red blood cells seen         Microbiology:  Fungal testing  Recent Labs   Lab Test 08/25/21  1427 07/14/21  1633 06/29/21  1412 06/28/21  2208 05/25/21  1627   ASPI  --   --  None Detected  --   --    FGTL <31 67  --  53 <31   ASPGAI 0.05 0.05  --  0.06 0.08   ASPGAA Negative Negative  --  Negative Negative       Last Culture results with specimen source  Culture Micro   Date Value Ref Range Status   07/09/2021 No growth  Final   07/08/2021 (A)  Preliminary    On day 2, isolated in broth  only:  Staphylococcus epidermidis     07/08/2021   Preliminary    Critical Value/Significant Value, preliminary result only, called to and read back by  Neli Boswell RN 1527 7/10/21 AM      07/08/2021 No anaerobes isolated  Final   07/08/2021   Final    Since this specimen was not transported in the proper anaerobic transport media, the   absence of anaerobes in this culture does not rule out the presence of anaerobes in this   specimen.     07/08/2021 No growth  Final   07/08/2021 No growth  Final   06/30/2021 No growth  Final   06/30/2021 No growth  Final   06/29/2021 No growth  Final   06/29/2021 No anaerobes isolated  Final   06/29/2021 Culture negative after 4 weeks  Final   06/29/2021 Culture negative for acid fast bacilli  Final   06/29/2021   Final    Assayed at [facility] [address] [place] [id number] [phone]   06/29/2021 No growth after 4 weeks  Final   06/28/2021 No growth  Final        Virology:  Respiratory virus testing    Recent Labs   Lab Test 07/08/21  1627 06/29/21  0914 03/05/21  1655 02/28/21  1020 02/28/21  1020 05/08/20  0910 01/15/20  2210   AFLU  --   --   --   --   --   --  Negative   IFLUA  --  Not Detected Not Detected  --  Not Detected   < > Not Detected   INFZA  --   --   --   --  Negative  --   --    FLUAH1  --  Not Detected Not Detected  --  Not Detected   < > Not Detected   [id number]  --  Not Detected Not Detected  --  Not Detected   < > Not Detected   FLUAH3  --  Not Detected Not Detected  --  Not Detected   < > Not Detected   BFLU  --   --   --   --   --   --  Positive*   IFLUB  --  Not Detected Not Detected  --  Not Detected   < > Detected, Abnormal Result*   INFZB  --   --   --   --  Negative  --   --    PIV1  --  Not Detected Not Detected  --  Not Detected   < > Not Detected   PIV2  --  Not Detected Not Detected  --  Not Detected   < > Not Detected   PIV3  --  Not Detected Not Detected  --  Not Detected   < > Not Detected   PIV4  --  Not Detected Not Detected   --  Not Detected   < > Not Detected   RSVA  --  Not Detected Not Detected  --  Not Detected   < > Not Detected   RSVB  --  Not Detected Not Detected  --  Not Detected   < > Not Detected   HMPV  --  Not Detected Not Detected  --  Not Detected   < > Not Detected   ADENOV  --  Not Detected Not Detected  --  Not Detected   < > Not Detected   CORONA  --  Not Detected Not Detected  --  Not Detected   < > Not Detected   [id number] Nasopharyngeal  --   --    < >  --   --   --     < > = values in this interval not displayed.       Log IU/mL of CMVQNT   Date Value Ref Range Status   2021 Not Calculated <2.1 [Log_IU]/mL Final   2021 Canceled, Test credited <2.1 [Log_IU]/mL Final     Comment:     Quantity not sufficient  NOTIFIED VIA ED TRACK BOARD AT 2355 ON 21 ELK     2021 Not Calculated <2.1 [Log_IU]/mL Final       No results found for: H6RES    EBV DNA Copies/mL   Date Value Ref Range Status   2021 <500 (A) Not Detected copies/mL Final     Comment:     EBV DNA Detected below the reportable range of 500 copies/mL   2021   Final     Comment:     EBV DNA Detetected below the reportable range of 500 copies/mL   2021 EBV DNA Not Detected EBVNEG^EBV DNA Not Detected [Copies]/mL Final   2021 3,509 (A) EBVNEG^EBV DNA Not Detected [Copies]/mL Final     Urine Studies     Recent Labs   Lab Test 21  0726 21  0640 21  0640 21  1230 21  0528   URINEPH 7.0 5.5 5.0 5.5 5.5   NITRITE Negative Negative Negative Negative Negative   LEUKEST Negative Negative Negative Negative Negative   WBCU 2 3 4 4 1     Imaging:  Recent Results (from the past 48 hour(s))   Echo Limited   Result Value    LVEF  55-60%    Narrative    [id number]  [id number]  [id number]  [id number]^MIKAYLA^[facility],[place]  Echocardiography Laboratory  [address] [place] [id number]     Name: LOIS FULLER  MRN: [id number]  : 1955  Study Date:  08/29/2021 11:19 AM  Age: 66 yrs  Gender: Male  Patient Location: [facility]  Reason For Study: Heart Transplant  Ordering Physician: KOMAL DEGROOT  Performed By: Kayleigh Slaughter RDCS     BSA: 2.0 m2  Height: 68 in  Weight: 198 lb  HR: 100  BP: 127/91 mmHg  ______________________________________________________________________________  Procedure  Limited Portable Echo Adult.  ______________________________________________________________________________  Interpretation Summary  Global and regional left ventricular function is normal with an EF of 55-60%.  Global right ventricular function is normal. The right ventricle is normal  size.  No significant valvular abnormalities. There is trace tricuspid regurgitation.  The estimated PA systolic pressure is 24 mmHg.  IVC diameter and respiratory changes fall into an intermediate range  suggesting an RA pressure of 8 mmHg.  No pericardial effusion is present.  This study was compared with the study from 08/05/2021. There are no  significant changes in the biventricular function or RA pressure. The  loculated pericardial effusion is no longer visualized.  ______________________________________________________________________________  Left Ventricle  Global and regional left ventricular function is normal with an EF of 55-60%.  Left ventricular size is normal. Relative wall thickness is increased  consistent with concentric remodeling. Diastolic function not assessed due to  heart transplant.     Right Ventricle  Global right ventricular function is normal. The right ventricle is normal  size.     Atria  The left atrium is enlarged due to cardiac transplantation. The right atrium  is enlarged due to cardiac transplantation.     Mitral Valve  The mitral valve is normal. Trace mitral insufficiency is present.     Aortic Valve  The valve leaflets are not well visualized. On Doppler interrogation, there is  no significant stenosis or regurgitation.     Tricuspid Valve  The  valve leaflets are not well visualized. Trace tricuspid insufficiency is  present. The right ventricular systolic pressure is approximated at 16.3 mmHg  plus the right atrial pressure.     Pulmonic Valve  The pulmonic valve cannot be assessed.     Vessels  The aorta root cannot be assessed. The thoracic aorta cannot be assessed. IVC  diameter and respiratory changes fall into an intermediate range suggesting an  RA pressure of 8 mmHg.     Pericardium  No pericardial effusion is present.     Compared to Previous Study  This study was compared with the study from 2021 . There are no  significant changes in the biventricular function or RA pressure. The  loculated pericardial effusion is no longer visualized.  ______________________________________________________________________________  MMode/2D Measurements & Calculations     IVSd: 0.86 cm  LVIDd: 4.6 cm  LVIDs: 3.2 cm  LVPWd: 1.2 cm  FS: 31.4 %  LV mass(C)d: 167.3 grams  LV mass(C)dI: 82.2 grams/m2  RWT: 0.52     Doppler Measurements & Calculations  TR max sloane: 202.0 cm/sec  TR max P.3 mmHg     ______________________________________________________________________________  Report approved by: Shayne Solorio 2021 12:58 PM

## 2021-08-31 NOTE — PROGRESS NOTES
Md notified that pt c/o inc SOB and chest tightness. VSS. Sating 100% on RA. Dr Winters ordered EKG and troponin level to be drawn. Will cont to follow poc and notify team with any changes.

## 2021-08-31 NOTE — PLAN OF CARE
Neuro: A&Ox4.pleasant.   Cardiac: SR. VSS.   Respiratory: Sating > 93% on RA.  GI/: Adequate urine output per urinal. BM X2. Loose stools.  Diet/appetite: Tolerating regular diet. Poor appetite.  Activity:  Assist of 1 w/ walker, up to chair.   Pain: At acceptable level on current regimen.   Skin: No new deficits noted.  LDA's: left piv.    Plan:  right heart cath completed today.Continue with POC. Notify primary team with changes.

## 2021-08-31 NOTE — PROGRESS NOTES
Cardiology Progress Note    Assessment & Plan   Mr. Jim Willingham is a 66yr old male with a history of NICM (s/p HM2 LVAD 6/2017), VT, AFib, CKD, DMII, COPD, now s/p OHT 5/6/21, who presented to the ER with shortness of breath and headache, found to have COVID PNA and KALI cavitary lesion.      Changes Today:  - Stop micafungin  - Will transition from Zosyn to Levofloxacin on 9/1 to complete total 14 day course. Based on renal function, dosing currently would be 750 mg q48 hours  - Follow up bronchoscopy studies  - RHC, biopsy today     NICM, s/p OHT 5/6/21  His post-transplant course has been c/b right pleural air leak (required prolonged chest tube), pAFib, CAP (RLL, 6/2021), recurrent pleural effusions (s/p thoracenteses x2 6/2021 and 7/2021), RLL cavitary lesions (per chest CT 7/14/21, BD glucan indeterminate, aspergillus negative), pericardial effusion (1.4cm per TTE 7/12/21, conservatively managed), low-grade EBV viremia, and recurrent/persistent gout flare.     Rejection history:  none  AlloMap scores:  n/a  DSAs:  none  Coronary angio/Ischemic eval:  Deferred due to renal dysfunction  Last RHC:  8/5/21, mildly elevated biventricular filling pressures with RA 10, mPA 25, PCW 14, and CI 2.95.  Echo/cMRI:  TTE 8/5/21 showed stable graft function, with LVEF 60-65% and normal RV size/function, and trivial loculated pericardial effusion.  -Follow-up repeat echo read  - RHC, biopsy 8/31     Serostatus:  - CMV D+/R+  - EBV D+/R+  - Toxo D-/R-     Immunosuppression:  - Continue baseline prednisone 5mg   - Holding MMF given COVID (PTA dose 1500mg twice daily)  - continue tacrolimus, goal level 8-10 (tacro level 9.0 on 8/25).  Continue tacro 4mg twice daily (PTA dose)  - check tacrolimus levels daily. Follow up level     PPx:  - CAV:  Aspirin 81mg daily.  Statin had been held due to elevated LFTs, defer restarting now.  - GI:  Pantoprazole 40mg daily  - Osteoporosis:  Calcium/vitamin D supplements  - CMV:  Completed 3m  Valcyte therapy, level undetectable on admission  - PCP:  Received pentamidine 8/11, due q28 days  - Thrush:  Clotrimazole troches QID -- started 8/5 to augment tacro levels (stopped on 8/29, given elevated tacro level)  - Toxo:  n/a     Graft function:  - BPs:  Controlled  - HRs:  borderline Tachycardic, 80s-90s  - fluid status:  Euvolemic, not on diuretics       COVID-19 PNA  Presented with dyspnea and fevers. Found to be covid positive (x2). D-Dimer 3.27. Currently on RA.   - Lovenox 0.5mg/kg subcutaneous q12 hours  - S/p COVID-19 monoclonal antibodies x1 --- ok'd and recommended per ID, discussed with pharmacy  - S/p 5 days remdesivir 100mg daily  - CBC with diff trends     Recurrent exudative pleural effusions (6/2021, 7/2021)  KALI Cavitary Lung Lesion  The effusion was negative for fungal/bacterial/AFB cx.  Treated with IV ABx then levaquin.  Urine and serum Blasto and Histo Ag were negative, A galactomannan was negative, and BD glucan was intermediate. Repeat chest CT showed peripheral consolidative opacity in the left upper lobe which appears partially cavitating. Has associated pleuritic chest pain. 1,3 Beta D Glucan and Aspergillus galactomanan negative.  - Transplant ID following, appreciate recs   - Stop micafungin   - Continue Zosyn into 9/1   - Will transition from Zosyn to Levofloxacin on 9/1 to complete total 14 day course. Based on renal function, dosing currently would be 750 mg q48 hours   - Repeat CT scan in 4-6 weeks   - Follow up with ID in clinic in 4 weeks  - Follow-up final bronchoscopy results     EBV viremia  EBV quant <500 this admission.     Gout/pseudogout  Predated transplant.  Has had recurrent flares following transplant, and continues to follow with rheumatology.  He notes that his current flare started in his fingers, and has now progressed up to his elbow.  The pain limits his activity.  He has been treated with Anakinra, with positive results, but has not been approved for  outpatient use by his insurance.  He recently completed a steroid burst per rheum (30mg x 4 days, 20mg x 4 days, 10mg x 4 days, then back to 5mg per transplant protocol), with little effect, so was advised to start a prednisone 40mg x 5 days today -- which he has not yet started.  He was given a one-time dose of hydrocortisone for adrenal insufficiency. He continues to complain of pain.   - S/p Anakinra 100mg SubQ qday for 3 days. Patient denies complaints currently. If further concerns, will formally consult Rheum  - defer PTA allopurinol therapy given active flare     Iron deficiency anemia  Iron sat 8/5 was 13%.  He was started on po iron, but note that absorption is impaired while on po PPI. S/p IV Venofer (8/26-8/30)     WALTER on CKD  Creatinine had been stable at 1.4-1.6, but has been elevated to 1.9-2s in the recent weeks.  Likely ddx include tacro therapy and mild hypovolemia.  He received IVF in the ED.  He is not on diuretics.  Will closely monitor tacro levels. Stable around 2, slightly downtrending.   - continue to trend BMP daily     Transaminitis, resolved  Questionable Choledocholithiasis  Abdominal ultrasound 7/14/21 showing hepatomegaly with steatosis and no biliary dilatation.  LFTs stable on admission.   - trend LFTs     Severe protein calorie malnutrition  History of Sylvester En Y gastric bypass  Albumin on arrival 2.6.  - nutrition consult      DMII:  Decrease lantus to 10 units at bedtime (PTA 18units), given AM hypoglycemia, med sliding scale insulin, hypoglycemia protocol.    - Holding Lantus d/t AM hypoglycemia, well-controlled blood sugars  Depression:  PTA wellbutrin 75mg BID  COPD:  PTA singulair, Trelegy Ellipta, Albuterol     Patient seen and discussed with Dr. Dg [facility]  Internal Medicine Resident, PGY-2  Pager: [phone]    Interval History   No events overnight. Feels similar to yesterday. Denies SOB, chest pain, abdominal pain, fevers, chills, nausea, vomiting.  "    Physical Exam   Temp: 98.3  F (36.8  C) Temp src: Oral BP: (!) 138/97 Pulse: 93   Resp: 20 SpO2: 97 % O2 Device: None (Room air)    Vitals:    08/28/21 0500 08/29/21 0412 08/31/21 0625   Weight: 89.5 kg (197 lb 5 oz) 90.7 kg (199 lb 15.3 oz) 88.7 kg (195 lb 8.8 oz)     Vital Signs with Ranges  Temp:  [97.5  F (36.4  C)-98.6  F (37  C)] 98.3  F (36.8  C)  Pulse:  [] 93  Resp:  [14-20] 20  BP: (113-145)/() 138/97  SpO2:  [86 %-100 %] 97 %  I/O last 3 completed shifts:  In: 1150 [P.O.:900; I.V.:250]  Out: 675 [Urine:675]     , Blood pressure (!) 138/97, pulse 93, temperature 98.3  F (36.8  C), resp. rate 20, height 1.727 m (5' 8\"), weight 88.7 kg (195 lb 8.8 oz), SpO2 97 %.  195 lbs 8.77 oz  GEN:  Alert, oriented x 3, appears comfortable, NAD, lying down and resting in bed  HEENT: NC/AT, no scleral icterus  CV:  Regular rate and rhythm, no murmur, JVP 8 cm.  +S1, S2.  LUNGS:  Breathing comfortably. No crackles appreciated.   ABD:  +bowel sounds, soft, non-tender/non-distended.  No rebound/guarding/rigidity.  EXT:  No edema or cyanosis.  Warm. +pedal and radial pulses  NEURO: alert and oriented, moving all 4 extremities to command     Medications     ACE/ARB/ARNI NOT PRESCRIBED       BETA BLOCKER NOT PRESCRIBED         amitriptyline  25 mg Oral At Bedtime     aspirin  81 mg Oral Daily     buPROPion  75 mg Oral BID     calcium carbonate 600 mg-vitamin D 400 units  1 tablet Oral BID w/meals     fluticasone-vilanterol  1 puff Inhalation Daily     gabapentin  300 mg Oral TID     insulin aspart  1-7 Units Subcutaneous TID AC     insulin aspart  1-5 Units Subcutaneous At Bedtime     [Held by provider] insulin glargine  10 Units Subcutaneous At Bedtime     melatonin  10 mg Oral At Bedtime     montelukast  10 mg Oral At Bedtime     multivitamin w/minerals  1 tablet Oral Daily     pantoprazole  40 mg Oral QAM AC     piperacillin-tazobactam  3.375 g Intravenous Q6H     predniSONE  5 mg Oral Daily     sodium " chloride (PF)  3 mL Intracatheter Q8H     tacrolimus  4 mg Oral BID IS     umeclidinium  1 puff Inhalation Daily       Data   Recent Labs   Lab 08/31/21  1224 08/31/21  0958 08/31/21  0816 08/31/21  0624 08/30/21  0627 08/29/21  0825 08/28/21  0633   WBC  --   --   --  4.9 3.8* 3.6* 2.6*   HGB  --  8.5*  --  8.3* 7.4* 7.7* 7.5*   MCV  --   --   --  92 90 89 88   PLT  --   --   --  275 234 253 222   NA  --   --   --  138 136 142 137   POTASSIUM  --   --   --  4.0 3.3* 3.3* 3.4   CHLORIDE  --   --   --  108 108 111* 110*   CO2  --   --   --  24 24 26 25   BUN  --   --   --  10 12 16 23   CR  --   --   --  1.65* 1.57* 1.78* 1.89*   ANIONGAP  --   --   --  6 4 5 2*   QAMAR  --   --   --  7.8* 7.8* 7.9* 7.7*   *  --  111* 116* 131* 50* 64*   ALBUMIN  --   --   --   --   --   --  1.9*   PROTTOTAL  --   --   --   --   --   --  5.4*   BILITOTAL  --   --   --   --   --   --  0.3   ALKPHOS  --   --   --   --   --   --  77   ALT  --   --   --   --   --   --  10   AST  --   --   --   --   --   --  11       Recent Results (from the past 24 hour(s))   Cardiac Catheterization    Narrative      Fluoroscopy ultrasound was used to visualize.    Right sided filling pressures are normal.    Normal PA pressures.    Left sided filling pressures are normal.    Normal cardiac output level.     Endomyocardial Biopsy.

## 2021-08-31 NOTE — PROGRESS NOTES
[facility]  Transplant Infectious Disease Progress Note     Patient:  Jim Willingham, Date of birth 1955, Medical record number [id number]  Date of Visit:  08/31/2021         Assessment and Recommendations:   66 year old male with NICMP s/p LVAD in 2017 and OHT in 5/2021 who presented with COVID 19 and KALI consolidative and cavitary lesion.     1. Mild to moderate COVID pneumonia + KALI cavitary consolidation: tested positive for COVID on 8/24. Received 5 days of remdesivir. CT chest showed KALI consolidative opacity w/ partial cavitation and multiple pulmonary nodules. Previous RLL cavitary lesion resolved. Reviewed CT with radiology-the presence of bronchograms w/o invading into the chest wall is suggestive of a bacterial etiology. It does not appear typical for Aspergillus pneumonia. There is evidence that this could be developing into an organizing pneumonia or abscess. Blood cultures have been negative and 8/28 TTE w/o significant valvular abnormalities making septic emboli less likely.  Serum Asp gm and 1,3 BD glucan, CRAG, MRSA nares negative. 8/30 bronchoscopy pending. Pending tests include AFB, aerobic culture, fungal culture, nocardia culture, Legionella, respiratory PCR, CMV PCR, actinomyces culture, PJP, and Asp gm. Cytology did not reveal fungal, viral or Pneumocystis effects seen.  If cultures are negative at 2-3 days will attempt to send specimen for 16 and 28S.     2. Leukopenia: Likely multifactorial. ANC okay.    Previous ID problems:  1. RUPINDER hominis BSI in 9/2020 treated with vancomycin for 6 weeks, presumed to be LVAD related.  2. MRSE in a hand joint in broth only deemed a contaminant on 7/8/2021 with inflamed joint due to acute pseudogout.   3. RLL pneumonia with exudative pleural effusion in 7/2021. The effusion was negative for fungal/bacterial/AFB cx. Treated with IV ABx then levaquin. Urine and serum Blasto and Histo Ag were negative, A galactomannan was  negative for BD glucan was intermediate. No antifungal therapy was given with repeat CT 7/23/2021 showed improvement.    Other Infectious Diseases Concerns Include:  - QTc interval: QTc 420  - Bacterial prophylaxis: on treatment  - PCP prophylaxis:pentamidine 8/11  - Viral serostatus & prophylaxis:CMV D+/R+, EBV D+/R+, HSV1+/2-, VZV +, Toxo D-/R-  - Fungal prophylaxis: on treatment  - Immunization status: when more stable, candidate for prevnar then the pneumovax vaccines, shingrix vaccine and the COVID-19 vaccine but when he is not on such a high dose of prednisone.    - Immunoglobulin status: IgG 838  - Isolation status: COVID special precautions  - Immunosuppression: Basiliximab for induction. TAC/MMF (held)/pred    Recommendations:  1. Continue pip/tazo 3.375 g IV q 6 hours through tomorrow then transition to Levofloxacin 750 mg po q 48 hours to complete another 7 days for a total of 14 days (crcl 49 and QTc 420).   2. Okay to stop micafungin  3. Repeat CT chest in 4 weeks-please order and arrange at the time of discharge  4. Will arrange ID follow up in 4-6 weeks  5. Please alert me if there are abnormal results on the bronchoscopy  6. Will attempt to get micro approval for 16S and 28S  7. Transplant ID will sign off. Call with questions or concerns.     Kateryna Londono DO.   Pager [phone]        Interval History:   Bronchoscopy results are pending.  Afebrile. Overall he feels better compared to when he was admitted. Continues to have a productive cough w/ brown, green sputum. Reviewed CT with radiology. Denies subjective fevers, chills or night sweats.       Transplants:  5/6/2021 (Heart), Postoperative day:  117.  Coordinator Yolette Rueda    Review of Systems:Remaining systems all reviewed     Antimicrobial History  Micafungin 8/25-C  Pip/tazo 8/25-C  Remdesivir 8/24-8/28  Vancomycin 8/25    Immunosuppression prednisone 5, tacrolimus         Current Medications & Allergies:       amitriptyline  25 mg  Oral At Bedtime     aspirin  81 mg Oral Daily     buPROPion  75 mg Oral BID     calcium carbonate 600 mg-vitamin D 400 units  1 tablet Oral BID w/meals     fluticasone-vilanterol  1 puff Inhalation Daily     gabapentin  300 mg Oral TID     insulin aspart  1-7 Units Subcutaneous TID AC     insulin aspart  1-5 Units Subcutaneous At Bedtime     [Held by provider] insulin glargine  10 Units Subcutaneous At Bedtime     melatonin  10 mg Oral At Bedtime     micafungin  150 mg Intravenous Q24H     montelukast  10 mg Oral At Bedtime     multivitamin w/minerals  1 tablet Oral Daily     pantoprazole  40 mg Oral QAM AC     piperacillin-tazobactam  3.375 g Intravenous Q6H     predniSONE  5 mg Oral Daily     sodium chloride (PF)  3 mL Intracatheter Q8H     tacrolimus  4 mg Oral BID IS     umeclidinium  1 puff Inhalation Daily       Infusions/Drips:    ACE/ARB/ARNI NOT PRESCRIBED       BETA BLOCKER NOT PRESCRIBED         Allergies   Allergen Reactions     Grass Shortness Of Breath     Ace Inhibitors Cough     Dust Mites Other (See Comments)     Asthma     Mold Other (See Comments)     Asthma     Penicillins Other (See Comments)     Unknown - childhood exposure    Tolerated Zosyn 9/18-9/20/2020     Sulfa Drugs Other (See Comments) and Unknown     Unknown childhood reaction            Physical Exam:   Ranges for vital signs:  Temp:  [97.5  F (36.4  C)-98.6  F (37  C)] 98.3  F (36.8  C)  Pulse:  [] 93  Resp:  [11-22] 20  BP: (104-145)/() 144/94  SpO2:  [86 %-100 %] 86 %  Vitals:    08/28/21 0500 08/29/21 0412 08/31/21 0625   Weight: 89.5 kg (197 lb 5 oz) 90.7 kg (199 lb 15.3 oz) 88.7 kg (195 lb 8.8 oz)       Physical Examination:  GENERAL:in bed. in no acute distress. Pleasant.   HEAD:  Head is normocephalic, atraumatic   EYES:  Eyes have anicteric sclerae without conjunctival injection.  ENT:  Oropharynx is moist without exudates or ulcers. No sinus tenderness.   LUNGS:  Clear but diminished to auscultation bilaterally.  No accessory muscle use. Not on oxygen.   CARDIOVASCULAR:  Regular rate and rhythm with no murmurs  ABDOMEN:  Normal bowel sounds, soft, non-tender, non-distended.   SKIN:  No acute rashes.    EXTREMITIES: No joint erythema or swelling.   NEUROLOGIC:  Grossly nonfocal. Active x4 extremities. Alert. Oriented.          Laboratory Data:     Metabolic Studies       Recent Labs   Lab Test 08/31/21  1224 08/31/21  0624 08/30/21  0627 08/25/21  1645 08/25/21  1427 08/24/21  1758 08/24/21  0613 08/24/21  0559 08/23/21  0918 08/05/21  0931 07/09/21  0632 07/08/21  1533 06/24/21  0905 06/21/21  0933 06/07/21  0807 05/05/21  0628 05/04/21  2313   NA  --  138 136   < >  --    < > 136  --   --  137   < > 134  --  131* 140  --  135   POTASSIUM  --  4.0 3.3*   < >  --    < > 4.1  --   --  4.3   < > 4.6  --  4.8 4.2  --  4.0   CHLORIDE  --  108 108   < >  --    < > 106  --   --  107   < > 104  --  97 105  --  101   CO2  --  24 24   < >  --    < > 26  --   --  24   < > 22  --  26 30  --  26   ANIONGAP  --  6 4   < >  --    < > 4  --   --  6   < > 7  --  8 5  --  7   BUN  --  10 12   < >  --    < > 32*  --   --  27   < > 37*  --  46* 41*  --  50*   CR  --  1.65* 1.57*   < >  --    < > 1.91*  --   --  1.45*   < > 2.37*  --  2.31* 1.93*  --  1.60*   GFRESTIMATED  --  43* 46*   < >  --    < > 36*  --   --  51*   < > 28*  --  29* 36*  --  45*   * 116* 131*   < >  --    < > 192*  --    < > 85   < > 133*  --  285* 128*  --  115*   A1C  --   --   --   --   --   --   --   --   --   --   --   --   --   --   --   --  5.1   QAMAR  --  7.8* 7.8*   < >  --    < > 8.0*  --   --  8.2*   < > 7.9*  --  8.6 8.2*  --  9.1   PHOS  --   --   --   --   --   --   --   --   --  2.4*  --   --   --  3.7 3.7   < > 3.8   MAG  --  1.9 2.0   < >  --    < > 1.5*  --   --  1.8   < >  --   --  1.9 1.7  --  2.3   LACT  --   --   --   --   --   --   --  0.8  --   --   --  1.0   < >  --   --    < >  --    PCAL  --   --   --   --   --   --  0.16  --   --   --   --   0.18   < >  --   --    < >  --    FGTL  --   --   --   --  <31  --   --   --   --   --    < >  --    < >  --   --    < >  --    CKT  --   --   --   --   --   --   --   --   --   --   --   --   --  31 49  --   --     < > = values in this interval not displayed.       Hepatic Studies    Recent Labs   Lab Test 08/28/21  0633 08/24/21  0613 08/05/21 0931 07/13/21  2053 07/10/21  0624   BILITOTAL 0.3 0.6 0.5  --   --    DBIL 0.1  --   --   --   --    ALKPHOS 77 122 281*  --   --    PROTTOTAL 5.4* 6.2* 6.4*  --  5.3*   ALBUMIN 1.9* 2.6* 2.9*  --   --    AST 11 10 12  --   --    ALT 10 17 36  --   --    LDH  --   --   --  252* 220       Hematology Studies      Recent Labs   Lab Test 08/31/21  0958 08/31/21  0624 08/30/21  0627 08/29/21  0825 08/28/21  0633 08/27/21  0550 08/27/21  0550 08/26/21  0539   WBC  --  4.9 3.8* 3.6* 2.6*  --  2.6* 4.5   ANEU  --  1.7 1.8 1.7  --   --  1.3* 3.3   ALYM  --  1.7 1.2 0.9  --   --  0.7* 0.7*   VIJAY  --  0.9 0.6 0.8  --   --  0.4 0.4   AEOS  --  0.2 0.2 0.1  --   --  0.1 0.0   HGB 8.5* 8.3* 7.4* 7.7* 7.5*   < > 7.1* 7.3*   HCT  --  28.0* 25.2* 25.3* 24.9*   < > 23.7* 24.3*   PLT  --  275 234 253 222   < > 226 233    < > = values in this interval not displayed.       Arterial Blood Gas Testing    Recent Labs   Lab Test 08/24/21  1142 08/24/21  0559 06/28/21  2247 05/09/21  0956 05/09/21  0902 05/07/21  1900 05/07/21  1700 05/07/21  1420 05/07/21  0340 05/06/21  2152   PH  --  7.40  --   --   --  7.40 7.38 7.39 7.38 7.37   PCO2  --   --   --   --   --  38 41 40 41 44   PO2  --   --   --   --   --  101 102 130* 153* 148*   HCO3  --   --   --   --   --  23 24 24 24 25   O2PER 99  --  21.0 21  21 REPORT AMENDED: 2LNC Canceled, Test credited  2L 40 40.0  40.0 40  40        Medication levels    Recent Labs   Lab Test 08/30/21  0627 05/13/21  0534 05/08/21 2004   VANCOMYCIN  --   --  18.5   TACROL 11.6   < >  --     < > = values in this interval not displayed.       Inflammatory Markers     Recent Labs   Lab Test 08/27/21  0550 07/19/21  0630 07/18/21  0552 07/16/21  0614 07/09/21  0632 07/08/21  1746 07/08/21  1533 10/27/20  1250 10/20/20  1305 10/13/20  1320 10/06/20  1320 09/30/20  1130   SED  --   --   --   --   --  72*  --  10 8 10 20 9   .0* 13.0* 7.7 4.2 98.0*  --  87.0* 3.5 11.0* 12.0* 5.7 0.6*       Immune Globulin Studies     Recent Labs   Lab Test 12/21/20  1133      IGM 55          Pancreatitis testing    Recent Labs   Lab Test 05/26/21  1340 05/04/21  2313 11/05/20  0907 08/05/20  0335   AMYLASE 49 93  --   --    LIPASE 25*  --   --  168   TRIG  --   --  91  --        Gout Labs      Recent Labs   Lab Test 08/24/21  0613 03/12/21  0605 02/01/21  1127 01/09/21  0538 12/21/20  1133   URIC 5.3 4.8 3.9 5.0 4.5       Clotting Studies    Recent Labs   Lab Test 07/19/21  0630 07/18/21  0552 07/17/21  0626 07/16/21  0614 05/07/21  0340 05/06/21  0650   INR 1.16* 1.11 1.12 1.07   < > 1.45*   PTT  --   --   --   --   --  35    < > = values in this interval not displayed.       Iron Testing    Recent Labs   Lab Test 08/31/21  0624 08/05/21  0931 05/30/21  0526 05/29/21  0606 05/13/21  0534 02/09/21  0518   IRON  --  30*  --   --  38 28*   FEB  --  240  --   --  215* 285   IRONSAT  --  13*  --   --  18 10*   VITA  --  98  --   --  98 33   MCV 92 95   < > 96 94  --    FOLIC  --   --   --   --  14.4  --    B12  --   --   --   --  734  --    HAPT  --   --   --  110  --   --     < > = values in this interval not displayed.       Thyroid Studies     Recent Labs   Lab Test 07/09/21  0632 02/01/21  1127 12/18/20  1018 06/24/20  0747 07/31/19  1050   TSH 3.16 3.52 3.45 1.30 1.23       Body fluid stats    Recent Labs   Lab Test 08/30/21  1103 08/30/21  1103 07/11/21  1539 07/10/21  1507 07/08/21  2114 06/29/21  1710 06/29/21  1710   FTYP  --   --   --  Pleural fluid Canceled, Test credited  --  Pleural fluid   FCOL Colorless  --  Red* Red  --   --  Bloody   FAPR Clear  --  Clear Cloudy  --     < > Turbid   FWBC 3  --  330 384  --   --  770   FNEU  --   --  10 52  --   --  42   FLYM  --   --  81 28  --   --  42   FMONO  --  100 9 20  --    < > 16   FTP  --   --   --  2.7  --   --  2.9   GS  --   --   --   --  No organisms seen  Many  WBC'S seen  predominantly PMN's    --  No organisms seen  Few  WBC'S seen    Many  Red blood cells seen      < > = values in this interval not displayed.       Microbiology:  Fungal testing  Recent Labs   Lab Test 08/25/21  1427 07/14/21  1633 06/29/21  1412 06/28/21  2208 05/25/21  1627   ASPI  --   --  None Detected  --   --    FGTL <31 67  --  53 <31   ASPGAI 0.05 0.05  --  0.06 0.08   ASPGAA Negative Negative  --  Negative Negative       Last Culture results with specimen source  Culture Micro   Date Value Ref Range Status   07/09/2021 No growth  Final   07/08/2021 (A)  Preliminary    On day 2, isolated in broth only:  Staphylococcus epidermidis     07/08/2021   Preliminary    Critical Value/Significant Value, preliminary result only, called to and read back by  Neli Boswell RN 1527 7/10/21 AM      07/08/2021 No anaerobes isolated  Final   07/08/2021   Final    Since this specimen was not transported in the proper anaerobic transport media, the   absence of anaerobes in this culture does not rule out the presence of anaerobes in this   specimen.     07/08/2021 No growth  Final   07/08/2021 No growth  Final   06/30/2021 No growth  Final   06/30/2021 No growth  Final   06/29/2021 No growth  Final   06/29/2021 No anaerobes isolated  Final   06/29/2021 Culture negative after 4 weeks  Final   06/29/2021 Culture negative for acid fast bacilli  Final   06/29/2021   Final    Assayed at [facility] [address] [place] [id number] [phone]   06/29/2021 No growth after 4 weeks  Final   06/28/2021 No growth  Final        Virology:  Respiratory virus testing    Recent Labs   Lab Test 07/08/21  1627 06/29/21  0914 03/05/21  1655 02/28/21  1020 02/28/21  1020  05/08/20  0910 01/15/20  2210   AFLU  --   --   --   --   --   --  Negative   IFLUA  --  Not Detected Not Detected  --  Not Detected   < > Not Detected   INFZA  --   --   --   --  Negative  --   --    FLUAH1  --  Not Detected Not Detected  --  Not Detected   < > Not Detected   [id number]  --  Not Detected Not Detected  --  Not Detected   < > Not Detected   FLUAH3  --  Not Detected Not Detected  --  Not Detected   < > Not Detected   BFLU  --   --   --   --   --   --  Positive*   IFLUB  --  Not Detected Not Detected  --  Not Detected   < > Detected, Abnormal Result*   INFZB  --   --   --   --  Negative  --   --    PIV1  --  Not Detected Not Detected  --  Not Detected   < > Not Detected   PIV2  --  Not Detected Not Detected  --  Not Detected   < > Not Detected   PIV3  --  Not Detected Not Detected  --  Not Detected   < > Not Detected   PIV4  --  Not Detected Not Detected  --  Not Detected   < > Not Detected   RSVA  --  Not Detected Not Detected  --  Not Detected   < > Not Detected   RSVB  --  Not Detected Not Detected  --  Not Detected   < > Not Detected   HMPV  --  Not Detected Not Detected  --  Not Detected   < > Not Detected   ADENOV  --  Not Detected Not Detected  --  Not Detected   < > Not Detected   CORONA  --  Not Detected Not Detected  --  Not Detected   < > Not Detected   [id number] Nasopharyngeal  --   --    < >  --   --   --     < > = values in this interval not displayed.       Log IU/mL of CMVQNT   Date Value Ref Range Status   06/29/2021 Not Calculated <2.1 [Log_IU]/mL Final   06/28/2021 Canceled, Test credited <2.1 [Log_IU]/mL Final     Comment:     Quantity not sufficient  NOTIFIED VIA ED TRACK BOARD AT 2355 ON 6/28/21 ELK     06/07/2021 Not Calculated <2.1 [Log_IU]/mL Final       No results found for: H6RES    EBV DNA Copies/mL   Date Value Ref Range Status   08/24/2021 <500 (A) Not Detected copies/mL Final     Comment:     EBV DNA Detected below the reportable range of 500 copies/mL   08/05/2021   Final      Comment:     EBV DNA Detetected below the reportable range of 500 copies/mL   06/28/2021 EBV DNA Not Detected EBVNEG^EBV DNA Not Detected [Copies]/mL Final   06/07/2021 3,509 (A) EBVNEG^EBV DNA Not Detected [Copies]/mL Final     Urine Studies     Recent Labs   Lab Test 08/24/21  0726 07/09/21  0640 06/30/21  0640 05/25/21  1230 05/16/21  0528   URINEPH 7.0 5.5 5.0 5.5 5.5   NITRITE Negative Negative Negative Negative Negative   LEUKEST Negative Negative Negative Negative Negative   WBCU 2 3 4 4 1     Imaging:  Recent Results (from the past 48 hour(s))   Cardiac Catheterization    Narrative      Fluoroscopy ultrasound was used to visualize.    Right sided filling pressures are normal.    Normal PA pressures.    Left sided filling pressures are normal.    Normal cardiac output level.     Endomyocardial Biopsy.

## 2021-08-31 NOTE — DISCHARGE SUMMARY
[facility]   Cardiology II Service / Advanced Heart Failure  Discharge Summary     Jim Willingham MRN# [id number]   YOB: 1955 Age: 66 year old     DATE OF ADMISSION:  8/24/2021  DATE OF DISCHARGE: 9/2/2021  ADMITTING PROVIDER: Durna Mccarty MD  DISCHARGE PROVIDER: Renu Sears MD   PRIMARY PROVIDER: Ricardo Gómez    ADMIT DIAGNOSES:   1. NICM s/p OHT  2. COVID-19 Infection  3. KALI Cavitary Lesion  4. Chronic Kidney Disease  5. Type 2 Diabetes Mellitus  6. Iron Deficiency Anemia  7. Gout/Pseudogout  8. COPD    DISCHARGE DIAGNOSES:   1. NICM s/p OHT  2. COVID-19 Infection  3. KALI Cavitary Lesion  4. Chronic Kidney Disease  5. Type 2 Diabetes Mellitus  6. Iron Deficiency Anemia  7. Gout/Pseudogout  8. COPD    HPI: Please see the detailed H & P by Shelia Means from 8/24/2021. Briefly, Mr. Jim Willingham is a 66yr old male with a history of NICM (s/p HM2 LVAD 6/2017), VT, AFib, CKD, DMII, COPD, now s/p OHT 5/6/21, who presented to the ER with shortness of breath and headache.     He reports that he started feeling SOB and developed a low-grade fever about 2-3 days ago.  He awoke this morning at ~1am feeling extremely short of breath.  His temp has been running 98-99 .  He also has had a cough, congestion, and sore throat.  He has not been going out much, and has not had any ill contacts that he is aware of.  He has not been active and has been unable to walk much due to his gout.  His gout pain has not improved since his initial prednisone burst.  He has not had any recent Anakinra.  He states that the gout pain started in his fingers and has now spread up to his elbow.  His appetite is not great, but he is forcing himself to eat regularly.  He denies nausea, vomiting, diarrhea, and constipation.  His weight is down, was 187#, and he denies fluid retention.  He is not taking diuretics.  He notes stable BPs when checked.  He fell last week and hit his head on a dresser, and  "notes ongoing, intermittent headaches since.  He otherwise denies chest pain, palpitations, dizziness, acute vision changes, and signs of bleeding.     While in the ED, he was found to be febrile (101.4 ) and tachycardic (HRs 130s).  CXR was negative for acute changes.  Labs showed stable electrolytes, renal function, liver function, and blood counts.  Troponin was negative, EKG showed sinus tachycardia but no other changes.  Procal negative, lactic acid 0.8, BCs pending.  He given IV fluids and started on cefepime and azithromycin for possible sepsis, and was then found to be positive for COVID-19.  He was consequently admitted to the [facility] service for further care.    PHYSICAL EXAM:  Blood pressure (!) 125/90, pulse 105, temperature 98.3  F (36.8  C), temperature source Oral, resp. rate 16, height 1.727 m (5' 8\"), weight 88.7 kg (195 lb 8.8 oz), SpO2 93 %.  GEN:  Alert, oriented x 3, appears comfortable, NAD, lying down and resting in bed  HEENT: NC/AT, no scleral icterus  CV:  Regular rate and rhythm, no murmur, JVP 8 cm.  +S1, S2.  LUNGS:  Breathing comfortably. No crackles appreciated.   ABD:  +bowel sounds, soft, non-tender/non-distended.  No rebound/guarding/rigidity.  EXT:  No edema or cyanosis.  Warm. +pedal and radial pulses. Swelling most prominent around MCP joints on left hand with erythema and exquisite tenderness to palpation  NEURO: alert and oriented, moving all 4 extremities to command     LABS:   Last CBC:   Recent Labs   Lab Test 08/31/21  0958 08/31/21  0624   WBC  --  4.9   RBC  --  3.05*   HGB 8.5* 8.3*   HCT  --  28.0*   MCV  --  92   MCH  --  27.2   MCHC  --  29.6*   RDW  --  17.5*   PLT  --  275       Last CMP:  Recent Labs   Lab Test 08/31/21  1620 08/31/21  0624 08/28/21  0822 08/28/21  0633   NA  --  138   < > 137   POTASSIUM  --  4.0   < > 3.4   CHLORIDE  --  108   < > 110*   QAMAR  --  7.8*   < > 7.7*   CO2  --  24   < > 25   BUN  --  10   < > 23   CR  --  1.65*   < > 1.89*   * " 116*   < > 64*   AST  --   --   --  11   ALT  --   --   --  10   BILITOTAL  --   --   --  0.3   ALBUMIN  --   --   --  1.9*   PROTTOTAL  --   --   --  5.4*   ALKPHOS  --   --   --  77    < > = values in this interval not displayed.       IMAGING:  Results for orders placed or performed during the hospital encounter of 08/24/21   XR Chest Port 1 View    Narrative    EXAM: XR CHEST PORT 1 VIEW  LOCATION: [facility]  DATE/TIME: 8/24/2021 6:05 AM    INDICATION: cough, fever, SOB  COMPARISON: 07/18/2021. CTA chest 07/23/2021.      Impression    IMPRESSION: Small cavitary nodule of the medial right lower lobe on prior CTA chest not well seen by radiographic technique. Little change in scarring, pleural thickening, and small pleural effusion of the mid and lower right lung. Left lung largely   clear. Poststernotomy and coronary artery bypass grafting. 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 No visible pneumothorax.   CT Chest w/o Contrast    Narrative    CT CHEST W/O CONTRAST 8/24/2021 4:41 PM    History: Pneumonia, effusion or abscess suspected, x-ray done;  Respiratory illness, nondiagnostic x-ray    Comparison: Chest abdomen pelvis CT 7/23/2021, chest CT 7/13/2021,  same day chest radiograph    Technique: CT of the chest was obtained without intravenous contrast.  Axial, coronal, and sagittal reconstructions were obtained and  reviewed.    Findings:     Lungs: There is peripheral consolidative opacity in the left upper  lobe, which appears partially cavitating. Previous right lower lobe  cavitary lesion has resolved, with residual scarring. Similar extent  of right basilar atelectasis/scarring with traction bronchiectasis.  Bronchiectasis in the left lower lobe. Nodular-like thickening in the  lateral aspect of the pleura overlying the right lower lobe is stable.  Continued loculated right pleural effusion, with extension to the  anterior mediastinum. Pneumothorax  component has resolved. Multiple  pulmonary nodules, measuring up to 5 mm in the left upper lobe, which  are new (series 6 image 73). There is tree-in-bud nodularity in the  medial right upper lobe (series 6 image 81)  Airways: Small amount of debris within the trachea.  Vessels: Main pulmonary artery and aorta are normal in caliber.  Abandoned ICD lead.  Heart: Postsurgical changes of heart transplant. Heart size is normal  without pericardial effusion  Lymph nodes: No suspicious mediastinal or hilar lymphadenopathy.  Thyroid: Within normal limits.  Esophagus: Within normal limits    Upper abdomen: Surgical changes of gastric bypass. Otherwise  unremarkable.    Bones and soft tissues: No suspicious axillary lymphadenopathy or soft  tissue mass. No suspicious osseous lesion. Degenerative changes in the  spine.      Impression    Impression:   1. New peripheral consolidative opacity in the left upper lobe which  appears partially cavitating. There are also multiple new pulmonary  nodules measuring up to 5 mm. Differential includes pneumonia,  including atypical pneumonias with possible developing abscess  formation, organizing pneumonia, and septic pulmonary emboli. Previous  right lower lobe cavitary lesion appears resolved with residual  scarring.  2. Continued loculated right pleural effusion and anterior mediastinal  simple fluid collection. Pneumothorax component has resolved.  3. Stable bilateral lower lobe bronchiectasis and scarring.    I have personally reviewed the examination and initial interpretation  and I agree with the findings.    RODRIGUEZ GAY MD         SYSTEM ID:  [id number]   Echo Limited     Value    LVEF  55-60%    Narrative    [id number]  [id number]  [id number]  [id number]^KOMAL     [facility],[place]  Echocardiography Laboratory  [address] [place] [id number]     Name: LOIS FULLER  MRN: [id number]  : 1955  Study Date: 2021 11:19 AM  Age:  66 yrs  Gender: Male  [facility]  Reason For Study: Heart Transplant  Ordering Physician: KOMAL DEGROOT  Performed By: Kayleigh Slaughter RDCS     BSA: 2.0 m2  Height: 68 in  Weight: 198 lb  HR: 100  BP: 127/91 mmHg  ______________________________________________________________________________  Procedure  Limited Portable Echo Adult.  ______________________________________________________________________________  Interpretation Summary  Global and regional left ventricular function is normal with an EF of 55-60%.  Global right ventricular function is normal. The right ventricle is normal  size.  No significant valvular abnormalities. There is trace tricuspid regurgitation.  The estimated PA systolic pressure is 24 mmHg.  IVC diameter and respiratory changes fall into an intermediate range  suggesting an RA pressure of 8 mmHg.  No pericardial effusion is present.  This study was compared with the study from 08/05/2021. There are no  significant changes in the biventricular function or RA pressure. The  loculated pericardial effusion is no longer visualized.  ______________________________________________________________________________  Left Ventricle  Global and regional left ventricular function is normal with an EF of 55-60%.  Left ventricular size is normal. Relative wall thickness is increased  consistent with concentric remodeling. Diastolic function not assessed due to  heart transplant.     Right Ventricle  Global right ventricular function is normal. The right ventricle is normal  size.     Atria  The left atrium is enlarged due to cardiac transplantation. The right atrium  is enlarged due to cardiac transplantation.     Mitral Valve  The mitral valve is normal. Trace mitral insufficiency is present.     Aortic Valve  The valve leaflets are not well visualized. On Doppler interrogation, there is  no significant stenosis or regurgitation.     Tricuspid Valve  The valve leaflets are not well  visualized. Trace tricuspid insufficiency is  present. The right ventricular systolic pressure is approximated at 16.3 mmHg  plus the right atrial pressure.     Pulmonic Valve  The pulmonic valve cannot be assessed.     Vessels  The aorta root cannot be assessed. The thoracic aorta cannot be assessed. IVC  diameter and respiratory changes fall into an intermediate range suggesting an  RA pressure of 8 mmHg.     Pericardium  No pericardial effusion is present.     Compared to Previous Study  This study was compared with the study from 2021 . There are no  significant changes in the biventricular function or RA pressure. The  loculated pericardial effusion is no longer visualized.  ______________________________________________________________________________  MMode/2D Measurements & Calculations     IVSd: 0.86 cm  LVIDd: 4.6 cm  LVIDs: 3.2 cm  LVPWd: 1.2 cm  FS: 31.4 %  LV mass(C)d: 167.3 grams  LV mass(C)dI: 82.2 grams/m2  RWT: 0.52     Doppler Measurements & Calculations  TR max sloane: 202.0 cm/sec  TR max P.3 mmHg     ______________________________________________________________________________  Report approved by: Shayne Solorio 2021 12:58 PM         Cardiac Catheterization    Narrative      Right sided filling pressures are mildly elevated.    Mild elevated pulmonary hypertension.    Left sided filling pressures are mildly elevated.    Normal cardiac output level.    Hemodynamic data has been modified in Epic per physician review.    Successful endomyocardial biopsy. Results pending.        *Note: Due to a large number of results and/or encounters for the requested time period, some results have not been displayed. A complete set of results can be found in Results Review.       PROCEDURES:   - Bronchoscopy with BAL  - Right heart cath  - Heart biopsy    CONSULTATIONS:   Pulmonology  Infectious Disease  Rheumatology    HOSPITAL COURSE BY PROBLEM:     YARED s/p OHT 21  His post-transplant  course has been c/b right pleural air leak (required prolonged chest tube), pAFib, CAP (RLL, 6/2021), recurrent pleural effusions (s/p thoracenteses x2 6/2021 and 7/2021), RLL cavitary lesions (per chest CT 7/14/21, BD glucan indeterminate, aspergillus negative), pericardial effusion (1.4cm per TTE 7/12/21, conservatively managed), low-grade EBV viremia, and recurrent/persistent gout flare.     Rejection history:  none  AlloMap scores:  n/a  DSAs:  none  Coronary angio/Ischemic eval:  Deferred due to renal dysfunction  Last RHC:  8/5/21, mildly elevated biventricular filling pressures with RA 10, mPA 25, PCW 14, and CI 2.95.  Echo/cMRI:  TTE 8/5/21 showed stable graft function, with LVEF 60-65% and normal RV size/function, and trivial loculated pericardial effusion.  - RHC, biopsy 8/31  Final Diagnosis   Heart, allograft, right ventricle, endomyocardial biopsy:  - Acute cellular rejection: No rejection, Grade 0R (ISHLT 2004)  - Antibody-mediated rejection: No morphologic evidence of antibody-mediated rejection  - C3d and C4d are negative (see comment)     Serostatus:  - CMV D+/R+  - EBV D+/R+  - Toxo D-/R-     Immunosuppression:  - Continue baseline prednisone 5mg   - MMF initially held given COVID (PTA dose 1500mg twice daily). Started back at 500 mg BID on 9/1/21 with plans for further management as an outpatient.  - continue tacrolimus, goal level 8-10 (tacro level 6.3 on 9/2).  Tacrolimus dose changed to 4mg qAM and 5mg qPM. Follow up level on Monday 9/6/2021.     PPx:  - CAV:  Aspirin 81mg daily.  Statin had been held due to elevated LFTs, defer restarting now.  - GI:  Pantoprazole 40mg daily  - Osteoporosis:  Calcium/vitamin D supplements  - CMV:  Completed 3m Valcyte therapy, level undetectable on admission  - PCP:  Received pentamidine 8/11, due q28 days  - Thrush:  Clotrimazole troches QID -- started 8/5 to augment tacro levels (stopped on 8/29, given elevated tacro level)  - Toxo:  n/a     Graft  function:  - BPs:  Controlled  - HRs:  borderline Tachycardic, 80s-90s  - fluid status:  Euvolemic, not on diuretics       COVID-19 PNA  Presented with dyspnea and fevers. Found to be covid positive (x2). D-Dimer 3.27. Currently on RA.   - S/p COVID-19 monoclonal antibodies x1 --- ok'd and recommended per ID, discussed with pharmacy  - S/p 5 days remdesivir 100mg daily     Recurrent exudative pleural effusions (6/2021, 7/2021)  KALI Cavitary Lung Lesion  The effusion was negative for fungal/bacterial/AFB cx.  Treated with IV ABx then levaquin.  Urine and serum Blasto and Histo Ag were negative, A galactomannan was negative, and BD glucan was intermediate. Repeat chest CT showed peripheral consolidative opacity in the left upper lobe which appears partially cavitating. Has associated pleuritic chest pain. 1,3 Beta D Glucan and Aspergillus galactomanan negative. Initially on zosyn and micafungin. Per ID, upon reviewing CT study with radiology, imaging most suggestive of bacterial etiology, and not typical for fungal etiology. Bronchoscopy with BAL performed on 8/30. Micafungin stopped and plans to transition from Zosyn to levofloxacin to complete longer, 14-day course, given imaging appearance, concerns it could become an abscess. Bronch cultures on 9/1 returned with staph aureus, felt to be MSSA given recent negative MRSA nares. Based on this, changed plan from levofloxacin to augmentin for finishing out antibiotic course. Yeast felt to be a contaminant.   - Transplant ID following, appreciate recs              - Stop micafungin              - Stopped Zosyn on 9/1              - Discussed with ID. Given Staph Aureus on bronch culture, transition from Zosyn to Augmentin 875/125 PO BID for additional 2 weeks from 8/31 (EOT: 9/13)              - Repeat CT scan in 4-6 weeks              - Follow up with ID in clinic in 4-6 weeks   - Follow-up final bronchoscopy results     EBV viremia  EBV quant <500 this  admission.     Gout/pseudogout  Predated transplant.  Has had recurrent flares following transplant, and continues to follow with rheumatology.  He notes that his current flare started in his fingers, and has now progressed up to his elbow.  The pain limits his activity.  He has been treated with Anakinra, with positive results, but has not been approved for outpatient use by his insurance.  He recently completed a steroid burst per rheum (30mg x 4 days, 20mg x 4 days, 10mg x 4 days, then back to 5mg per transplant protocol), with little effect, so was advised to start a prednisone 40mg x 5 days today -- which he has not yet started.  He was given a one-time dose of hydrocortisone for adrenal insufficiency. He continues to complain of pain.   - S/p Anakinra 100mg SubQ qday for 3 days, with resolution of symptoms  - Patient with recurrent, severe left hand symptoms/pain on 9/1. Rheumatology consulted              - Restart PTA allopurinol              - Anakinra 100 mg on 9/1 and 9/2               - Will discharge on 0.6 mg BID colchicine until outpatient anakinra arrives   - Close outpatient follow-up in rheum clinic with Dr. Pettit     Iron deficiency anemia  Iron sat 8/5 was 13%.  He was started on po iron, but note that absorption is impaired while on po PPI. S/p IV Venofer (8/26-8/30)     DMII:    PTA Lantus 18 units. Decreased to 10 units at bedtime while inpatient, and then held, due to AM hypoglycemia.  - Continue holding PTA Lantus, given well-controlled blood sugars in the hospital. He will continue to monitor his blood sugars at home and closely follow-up with PCP as an outpatient    PENDING RESULTS:   Final Bronchoscopy Microbiology Results    DISCHARGE MEDICATIONS:  Current Discharge Medication List      START taking these medications    Details   amoxicillin-clavulanate (AUGMENTIN) 875-125 MG tablet Take 1 tablet by mouth every 12 hours for 12 days  Qty: 24 tablet, Refills: 0    Associated  Diagnoses: Cavitary lesion of lung      colchicine (COLCYRS) 0.6 MG tablet Take 1 tablet (0.6 mg) by mouth 2 times daily  Qty: 60 tablet, Refills: 0    Associated Diagnoses: Gouty arthropathy, chronic, without tophi         CONTINUE these medications which have CHANGED    Details   mycophenolate (GENERIC EQUIVALENT) 250 MG capsule Take 2 capsules (500 mg) by mouth 2 times daily  Qty: 120 capsule, Refills: 0    Associated Diagnoses: S/P orthotopic heart transplant (H)      predniSONE (DELTASONE) 5 MG tablet Take 1 tablet (5 mg) by mouth daily  Qty: 30 tablet, Refills: 0    Associated Diagnoses: S/P orthotopic heart transplant (H)      tacrolimus (GENERIC EQUIVALENT) 1 MG capsule Take 4 capsules (4 mg) by mouth every morning AND 5 capsules (5 mg) every evening.  Qty: 900 capsule, Refills: 11    Associated Diagnoses: S/P orthotopic heart transplant (H)         CONTINUE these medications which have NOT CHANGED    Details   allopurinol (ZYLOPRIM) 300 MG tablet Take 1 tablet (300 mg) by mouth daily  Qty: 90 tablet, Refills: 1    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, unspecified laterality      amitriptyline (ELAVIL) 25 MG tablet Take 1 tablet (25 mg) by mouth At Bedtime  Qty: 90 tablet, Refills: 0    Associated Diagnoses: S/P orthotopic heart transplant (H)      aspirin (ASA) 81 MG chewable tablet Take 1 tablet (81 mg) by mouth daily  Qty: 100 tablet, Refills: 1    Associated Diagnoses: S/P orthotopic heart transplant (H)      buPROPion (WELLBUTRIN) 75 MG tablet Take 1 tablet (75 mg) by mouth 2 times daily  Qty: 60 tablet, Refills: 1    Associated Diagnoses: S/P orthotopic heart transplant (H)      calcium citrate-vitamin D (CITRACAL) 315-250 MG-UNIT TABS per tablet Take 1 tablet by mouth 2 times daily  Qty: 60 tablet, Refills: 1    Associated Diagnoses: S/P orthotopic heart transplant (H)      ferrous sulfate (FEROSUL) 325 (65 Fe) MG tablet Take 1 tablet (325 mg) by  mouth daily (with breakfast)  Qty: 90 tablet, Refills: 3    Associated Diagnoses: Anemia      Fluticasone-Umeclidin-Vilanterol (TRELEGY ELLIPTA) 100-62.5-25 MCG/INH oral inhaler Inhale 1 puff into the lungs daily  Qty: 1 each, Refills: 1    Associated Diagnoses: Chronic obstructive pulmonary disease, unspecified COPD type (H)      !! gabapentin (NEURONTIN) 300 MG capsule Take 1 capsule (300 mg) by mouth 2 times daily At 12pm and 8pm  Qty: 60 capsule, Refills: 1    Associated Diagnoses: S/P orthotopic heart transplant (H)      !! gabapentin (NEURONTIN) 300 MG capsule Take 1 capsule (300 mg) by mouth every morning  Qty: 30 capsule, Refills: 1    Associated Diagnoses: S/P orthotopic heart transplant (H)      Melatonin 10 MG CAPS Take 1 capsule by mouth At Bedtime      montelukast (SINGULAIR) 10 MG tablet Take 1 tablet (10 mg) by mouth At Bedtime  Qty: 90 tablet, Refills: 1    Associated Diagnoses: S/P orthotopic heart transplant (H)      pantoprazole (PROTONIX) 40 MG EC tablet Take 1 tablet (40 mg) by mouth 2 times daily  Qty: 60 tablet, Refills: 1    Associated Diagnoses: S/P orthotopic heart transplant (H)      acetaminophen (TYLENOL) 325 MG tablet Take 3 tablets (975 mg) by mouth every 6 hours as needed for other (For optimal non-opioid multimodal pain management to improve pain control.)  Qty: 100 tablet, Refills: 1    Associated Diagnoses: S/P orthotopic heart transplant (H)      !! anakinra (KINERET) 100 MG/0.67ML SOSY injection Inject subcutaneously once every 24 hours for 3 days. Contact MD if still with gout symptoms on day 3. Hold for signs of infection, then seek medical attention.  Qty: 4.69 mL, Refills: 11    Associated Diagnoses: Acute gout due to renal impairment involving knee, unspecified laterality      !! anakinra (KINERET) 100 MG/0.67ML SOSY injection Inject 0.67 mLs (100 mg) Subcutaneous daily For 3 days. Hold for signs of infection, then seek medical attention.  Qty: 20.1 mL, Refills: 0     Comments: DUPLICATE  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, unspecified laterality; Acute gout due to renal impairment involving knee, unspecified laterality      blood glucose (NO BRAND SPECIFIED) test strip Use to test blood sugar four times daily or as directed.  Qty: 200 strip, Refills: 3    Associated Diagnoses: Type 2 diabetes mellitus with diabetic neuropathy, without long-term current use of insulin (H)      blood glucose (ONE TOUCH DELICA) lancing device Lancing device to be used with lancets.  Qty: 1 each, Refills: 0    Associated Diagnoses: Type 2 diabetes mellitus with complication, with long-term current use of insulin (H)      blood glucose monitoring (ONE TOUCH ULTRA 2) meter device kit Use to test blood sugar four times daily or as directed.  Qty: 1 kit, Refills: 0    Associated Diagnoses: Type 2 diabetes mellitus with diabetic neuropathy, without long-term current use of insulin (H)      Continuous Blood Gluc Transmit (DEXCOM G6 TRANSMITTER) MISC       insulin pen needle (32G X 4 MM) 32G X 4 MM miscellaneous Use four pen needles daily or as directed.  Qty: 110 each, Refills: 0    Associated Diagnoses: Type 2 diabetes mellitus with diabetic neuropathy, without long-term current use of insulin (H)      traMADol (ULTRAM) 50 MG tablet Take 0.5-1 tablets (25-50 mg) by mouth every 6 hours as needed for moderate pain  Qty: 25 tablet, Refills: 1    Associated Diagnoses: S/P orthotopic heart transplant (H)      VENTOLIN  (90 Base) MCG/ACT inhaler INHALE 2 PUFFS BY MOUTH EVERY 4 HOURS AS NEEDED. MAX OF 12 PUFFS PER 24 HOURS  Qty: 18 g, Refills: 3    Associated Diagnoses: Chronic obstructive pulmonary disease, unspecified COPD type (H)       !! - Potential duplicate medications found. Please discuss with provider.      STOP taking these medications       clotrimazole (MYCELEX) 10 MG lozenge Comments:   Reason for Stopping:         HUMALOG KWIKPEN 100  UNIT/ML soln Comments:   Reason for Stopping:         insulin glargine (LANTUS PEN) 100 UNIT/ML pen Comments:   Reason for Stopping:               DISCHARGE DISPOSITION: Jim Willingham will discharge to home in stable condition.     DISCHARGE INSTRUCTIONS:  Discharge Procedure Orders   Medication Therapy Management Referral   Referral Priority: Routine Referral Type: Med Therapy Management   Requested Specialty: Pharmacist   Number of Visits Requested: 1     Home care nursing referral     Home Care PT Referral for Hospital Discharge   Referral Priority: Routine Referral Type: Home Health Therapies & Aides   Number of Visits Requested: 1     Home Care OT Referral for Hospital Discharge   Referral Priority: Routine Referral Type: Consultation   Number of Visits Requested: 1       60 minutes spent in discharge, including >50% in counseling and coordination of care, medication review and plan of care recommended on follow up. Questions were answered.      It was our pleasure to care for Jim Willingham during this hospitalization. Please do not hesitate to contact me should there be questions regarding the hospital course or discharge plan.      Corey Robertson MD  Cardiology Fellow   [phone]

## 2021-08-31 NOTE — PLAN OF CARE
Neuro: A&Ox4. Follows commands. Able to make needs known.   Cardiac: SR/ST. HR 90s-115s. BP 120s-130s/90s    Respiratory: Sating >94% on RA. CPAP at HS.   GI/: Adequate urine output. No BM this shift. LBM 8/30.   Diet/appetite: NPO since 0000. Has R heart cath this AM.   Activity:  SBA/independent with sitting at edge of bed. Will call appropriately.   Pain: PRN dilauded given x1 for pleuritic pain with relief.   Skin: Generalized bruising.   LDA's: 1 PIV R arm infusing TKO.     Plan: Continue with POC. Notify primary team with changes.

## 2021-08-31 NOTE — PROGRESS NOTES
CLINICAL NUTRITION SERVICES - ASSESSMENT NOTE     Nutrition Prescription    RECOMMENDATIONS FOR MDs/PROVIDERS TO ORDER:  None at this time     Malnutrition Status:    Severe malnutrition in the context of acute on chronic illness     Recommendations already ordered by Registered Dietitian (RD):  Glucerna at 10 am and 2 pm snack   Special K bar at 2 pm snack   Thera-vit-m daily for micronutrient needs with poor PO intake and hx/o Sylvester-en-Y    Future/Additional Recommendations:  - Monitor adequacy of PO intake vs need for additional snacks/supplements  - Monitor weight trends and hydration status  - Encourage meals BID at minimum     Recommend the following baseline supplementation with hx/o Sylvester-en-Y gastric bypass:    MVI+M, 2 tablets daily (With at least 18 mg iron, 10 to 15 mg zinc and 2,000 international unit(s) vitamin A)    Vitamin D3, 500-1000 mcg daily (sublingual, liquid or nasal spray) once daily OR 1000 mcg injection per month    B complex, 1 tablet daily     Iron (for menstruating women or pending lab status), 1 tablet Vitron C OR 1 tablet (325 mg) ferrous sulfate + 500 mg tablet Vitamin C daily      REASON FOR ASSESSMENT  Jim Willingham is a/an 66 year old male assessed by the dietitian for LOS    Per chart review, PMH significant for COPD, Sylvester-en-Y gastric bypass (2017) and cardiomyopathy s/p heart transplant in May 2021. Admitted 2/2 COVID-19 infection with SOB, congestion, sore throat, cough and subjective fevers for a couple days PTA.     NUTRITION HISTORY  - Patient well known to clinical nutrition services with recent heart transplant. Last assessed by RD on 7/16/21. At this time it was noted pt had poor appetite and early satiety. Typically had only been eating 1 meal daily + up to 2 Premier protein shakes daily.   - Noted per H&P, pt uses a CGM (dexcom), humalog (4 units with Glucerna supplements and 8 units with meals plus 1-9 units per sliding scale) and 18 units lantus daily. Additionally  "takes standard post heart transplant medications (mycophenolate, prednisone and tacrolimus), citracal BID and 325 mg ferrous sulfate daily.  - Negative for urine ketones on admission.  - No known food allergies.    - Per admission nutrition risk screen, pt reported no recent weight loss and no decreased appetite, though question accuracy of this given nutrition hx a month ago per above and up to 14% wt loss per review of records as below.   - Spoke with pt via phone this afternoon who notes his appetite has been \"OK\" recently. Eating 1-2 meals daily (usually 2). Drinks 2 glucerna supplements daily at home. Additionally reports liking special K bars.     CURRENT NUTRITION ORDERS  Diet: Regular  Intake/Tolerance: Variable PO intake over admission (%) of meals. Only eating ~1 small meal daily from the kitchen. Pt reports appetite is OK, wanting glucerna supplements and special K bars which writer sent now and will order as snacks.     LABS  Labs reviewed  - Cr 1.65 (H), GFR 43 (L) --> improving, WALTER on CKD per provider notes   - K+ and Mg WNL  - -189    MEDICATIONS  Medications reviewed  - caltrate BID  - medium sliding scale insulin   - 10 units lantus daily     ANTHROPOMETRICS  Height: 172.7 cm (5' 8\")  Most Recent Weight: 88.7 kg (195 lb 8.8 oz)    IBW: 70 kg  BMI: Overweight BMI 25-29.9  Weight History: Wt appears stable over the past month. However, pt has up to 32 lb wt loss over the past 3-4 months (14%) which is clinically significant. Wt has been trending up over admission - suspect wt down on admit 2/2 dehydration with COVID-19 infection.   Wt Readings from Last 10 Encounters:   08/31/21 88.7 kg (195 lb 8.8 oz)   08/05/21 93.6 kg (206 lb 4.8 oz)   07/18/21 89.4 kg (197 lb 3.2 oz)   07/05/21 88.7 kg (195 lb 9.6 oz)   06/21/21 87.2 kg (192 lb 3.2 oz)   06/07/21 88.8 kg (195 lb 12.8 oz)   05/31/21 94.4 kg (208 lb 3.2 oz)   04/27/21 102.5 kg (226 lb)   04/21/21 103 kg (227 lb)   04/14/21 101.2 kg " (223 lb)     Dosing Weight: 75 kg (adjusted) - based on current wt of 88.7 kg and IBW of 70 kg    ASSESSED NUTRITION NEEDS  Estimated Energy Needs: 3720-5472 kcals/day (25 - 30 kcals/kg)  Justification: Maintenance per wt status   Estimated Protein Needs:  grams protein/day (1.2 - 1.5 grams of pro/kg)  Justification: Hypercatabolism with acute illness  Estimated Fluid Needs: (1 mL/kcal)   Justification: Maintenance    PHYSICAL FINDINGS  See malnutrition section below.    From chart:  - Last BM yesterday, daily BM x 1-4 since 8/27  - No edema noted  - Dry, bruised skin   - Pt had right heart cath this AM    MALNUTRITION  % Intake: </= 50% for >/= 5 days (severe)  % Weight Loss: > 7.5% in 3 months (severe)  Subcutaneous Fat Loss: Unable to assess with COVID-19 precautions   Muscle Loss: Unable to assess with COVID-19 precautions   Fluid Accumulation/Edema: None noted  Malnutrition Diagnosis: Severe malnutrition in the context of acute on chronic illness     NUTRITION DIAGNOSIS  Inadequate oral intake related to decreased appetite 2/2 COVID-19 infection and chronically at baseline x ~3 months as evidenced by variable intake of 1 meal daily x 1 week over admission, up to 14% wt loss in the past 3-4 months and pt report of intake down from baseline for several months PTA       INTERVENTIONS  Implementation  Nutrition Education: Introduced RD and role of care. Encouraged high protein intakes with infection. Discussed importance of adequate calorie and protein intake and encouraged meals BID at minimum. Reviewed available snacks/ONS and indications for use.    Collaboration with other providers - pt discussed this morning during interdisciplinary rounds   Medical food supplement therapy  Multivitamin/mineral supplement therapy     Goals  Patient to consume % of nutritionally adequate meal trays TID, or the equivalent with supplements/snacks.     Monitoring/Evaluation  Progress toward goals will be monitored and  evaluated per protocol.    Dominique Aranda RD, LD  6B RD Pager: [phone]

## 2021-09-01 LAB
ACANTHOCYTES BLD QL SMEAR: SLIGHT
ANION GAP SERPL CALCULATED.3IONS-SCNC: 6 MMOL/L (ref 3–14)
ATRIAL RATE - MUSE: 106 BPM
BASOPHILS # BLD MANUAL: 0.1 10E3/UL (ref 0–0.2)
BASOPHILS NFR BLD MANUAL: 2 %
BUN SERPL-MCNC: 9 MG/DL (ref 7–30)
CALCIUM SERPL-MCNC: 7.6 MG/DL (ref 8.5–10.1)
CHLORIDE BLD-SCNC: 107 MMOL/L (ref 94–109)
CO2 SERPL-SCNC: 25 MMOL/L (ref 20–32)
CREAT SERPL-MCNC: 1.54 MG/DL (ref 0.66–1.25)
DIASTOLIC BLOOD PRESSURE - MUSE: NORMAL MMHG
EOSINOPHIL # BLD MANUAL: 0 10E3/UL (ref 0–0.7)
EOSINOPHIL NFR BLD MANUAL: 0 %
ERYTHROCYTE [DISTWIDTH] IN BLOOD BY AUTOMATED COUNT: 18 % (ref 10–15)
GALACTOMANNAN AG BAL QL: NEGATIVE
GALACTOMANNAN AG SPEC IA-ACNC: 0.08
GFR SERPL CREATININE-BSD FRML MDRD: 47 ML/MIN/1.73M2
GLUCOSE BLD-MCNC: 107 MG/DL (ref 70–99)
GLUCOSE BLDC GLUCOMTR-MCNC: 104 MG/DL (ref 70–99)
GLUCOSE BLDC GLUCOMTR-MCNC: 114 MG/DL (ref 70–99)
GLUCOSE BLDC GLUCOMTR-MCNC: 130 MG/DL (ref 70–99)
GLUCOSE BLDC GLUCOMTR-MCNC: 151 MG/DL (ref 70–99)
GLUCOSE BLDC GLUCOMTR-MCNC: 172 MG/DL (ref 70–99)
HCT VFR BLD AUTO: 26.6 % (ref 40–53)
HGB BLD-MCNC: 7.8 G/DL (ref 13.3–17.7)
INTERPRETATION ECG - MUSE: NORMAL
LYMPHOCYTES # BLD MANUAL: 1.5 10E3/UL (ref 0.8–5.3)
LYMPHOCYTES NFR BLD MANUAL: 30 %
Lab: NORMAL
MAGNESIUM SERPL-MCNC: 1.6 MG/DL (ref 1.6–2.3)
MCH RBC QN AUTO: 26.8 PG (ref 26.5–33)
MCHC RBC AUTO-ENTMCNC: 29.3 G/DL (ref 31.5–36.5)
MCV RBC AUTO: 91 FL (ref 78–100)
METAMYELOCYTES # BLD MANUAL: 0.2 10E3/UL
METAMYELOCYTES NFR BLD MANUAL: 3 %
MONOCYTES # BLD MANUAL: 1.1 10E3/UL (ref 0–1.3)
MONOCYTES NFR BLD MANUAL: 21 %
MYELOCYTES # BLD MANUAL: 0.3 10E3/UL
MYELOCYTES NFR BLD MANUAL: 5 %
NEUTROPHILS # BLD MANUAL: 2 10E3/UL (ref 1.6–8.3)
NEUTROPHILS NFR BLD MANUAL: 39 %
P AXIS - MUSE: 56 DEGREES
PATH REPORT.COMMENTS IMP SPEC: NORMAL
PATH REPORT.FINAL DX SPEC: NORMAL
PATH REPORT.GROSS SPEC: NORMAL
PATH REPORT.MICROSCOPIC SPEC OTHER STN: NORMAL
PATH REPORT.RELEVANT HX SPEC: NORMAL
PERFORMING LABORATORY: NORMAL
PHOTO IMAGE: NORMAL
PLAT MORPH BLD: ABNORMAL
PLATELET # BLD AUTO: 288 10E3/UL (ref 150–450)
POTASSIUM BLD-SCNC: 3.4 MMOL/L (ref 3.4–5.3)
PR INTERVAL - MUSE: 158 MS
QRS DURATION - MUSE: 92 MS
QT - MUSE: 344 MS
QTC - MUSE: 456 MS
R AXIS - MUSE: 65 DEGREES
RBC # BLD AUTO: 2.91 10E6/UL (ref 4.4–5.9)
RBC MORPH BLD: ABNORMAL
SODIUM SERPL-SCNC: 138 MMOL/L (ref 133–144)
SYSTOLIC BLOOD PRESSURE - MUSE: NORMAL MMHG
T AXIS - MUSE: 54 DEGREES
TACROLIMUS BLD-MCNC: 6.7 UG/L (ref 5–15)
TEST NAME: NORMAL
TME LAST DOSE: NORMAL H
TME LAST DOSE: NORMAL H
VARIANT LYMPHS BLD QL SMEAR: PRESENT
VENTRICULAR RATE- MUSE: 106 BPM
WBC # BLD AUTO: 5 10E3/UL (ref 4–11)

## 2021-09-01 PROCEDURE — 250N000013 HC RX MED GY IP 250 OP 250 PS 637: Performed by: STUDENT IN AN ORGANIZED HEALTH CARE EDUCATION/TRAINING PROGRAM

## 2021-09-01 PROCEDURE — 80197 ASSAY OF TACROLIMUS: CPT

## 2021-09-01 PROCEDURE — 36415 COLL VENOUS BLD VENIPUNCTURE: CPT | Performed by: NURSE PRACTITIONER

## 2021-09-01 PROCEDURE — 250N000009 HC RX 250: Performed by: STUDENT IN AN ORGANIZED HEALTH CARE EDUCATION/TRAINING PROGRAM

## 2021-09-01 PROCEDURE — 99232 SBSQ HOSP IP/OBS MODERATE 35: CPT | Mod: 24 | Performed by: INTERNAL MEDICINE

## 2021-09-01 PROCEDURE — 85027 COMPLETE CBC AUTOMATED: CPT | Performed by: NURSE PRACTITIONER

## 2021-09-01 PROCEDURE — 250N000013 HC RX MED GY IP 250 OP 250 PS 637: Performed by: INTERNAL MEDICINE

## 2021-09-01 PROCEDURE — 250N000011 HC RX IP 250 OP 636: Performed by: INTERNAL MEDICINE

## 2021-09-01 PROCEDURE — 88346 IMFLUOR 1ST 1ANTB STAIN PX: CPT | Mod: 26 | Performed by: PATHOLOGY

## 2021-09-01 PROCEDURE — 88350 IMFLUOR EA ADDL 1ANTB STN PX: CPT | Mod: 26 | Performed by: PATHOLOGY

## 2021-09-01 PROCEDURE — 250N000012 HC RX MED GY IP 250 OP 636 PS 637: Performed by: STUDENT IN AN ORGANIZED HEALTH CARE EDUCATION/TRAINING PROGRAM

## 2021-09-01 PROCEDURE — 999N000157 HC STATISTIC RCP TIME EA 10 MIN

## 2021-09-01 PROCEDURE — 250N000012 HC RX MED GY IP 250 OP 636 PS 637: Performed by: NURSE PRACTITIONER

## 2021-09-01 PROCEDURE — 250N000013 HC RX MED GY IP 250 OP 250 PS 637

## 2021-09-01 PROCEDURE — 88307 TISSUE EXAM BY PATHOLOGIST: CPT | Mod: 26 | Performed by: PATHOLOGY

## 2021-09-01 PROCEDURE — 120N000005 HC R&B MS OVERFLOW UMMC

## 2021-09-01 PROCEDURE — 80048 BASIC METABOLIC PNL TOTAL CA: CPT | Performed by: NURSE PRACTITIONER

## 2021-09-01 PROCEDURE — 250N000013 HC RX MED GY IP 250 OP 250 PS 637: Performed by: NURSE PRACTITIONER

## 2021-09-01 PROCEDURE — 99223 1ST HOSP IP/OBS HIGH 75: CPT | Mod: GC | Performed by: INTERNAL MEDICINE

## 2021-09-01 PROCEDURE — 94660 CPAP INITIATION&MGMT: CPT

## 2021-09-01 PROCEDURE — 99233 SBSQ HOSP IP/OBS HIGH 50: CPT | Mod: 24 | Performed by: INTERNAL MEDICINE

## 2021-09-01 PROCEDURE — 83735 ASSAY OF MAGNESIUM: CPT | Performed by: NURSE PRACTITIONER

## 2021-09-01 RX ORDER — MYCOPHENOLATE MOFETIL 250 MG/1
500 CAPSULE ORAL
Status: DISCONTINUED | OUTPATIENT
Start: 2021-09-01 | End: 2021-09-02 | Stop reason: HOSPADM

## 2021-09-01 RX ORDER — ALLOPURINOL 300 MG/1
300 TABLET ORAL DAILY
Status: DISCONTINUED | OUTPATIENT
Start: 2021-09-01 | End: 2021-09-02 | Stop reason: HOSPADM

## 2021-09-01 RX ORDER — LEVOFLOXACIN 750 MG/1
750 TABLET, FILM COATED ORAL
Status: DISCONTINUED | OUTPATIENT
Start: 2021-09-01 | End: 2021-09-01

## 2021-09-01 RX ADMIN — CALCIUM CARBONATE 600 MG (1,500 MG)-VITAMIN D3 400 UNIT TABLET 1 TABLET: at 18:22

## 2021-09-01 RX ADMIN — MULTIPLE VITAMINS W/ MINERALS TAB 1 TABLET: TAB at 07:59

## 2021-09-01 RX ADMIN — Medication 50 MG: at 11:35

## 2021-09-01 RX ADMIN — MYCOPHENOLATE MOFETIL 500 MG: 250 CAPSULE ORAL at 18:22

## 2021-09-01 RX ADMIN — ALBUTEROL SULFATE 2 PUFF: 90 AEROSOL, METERED RESPIRATORY (INHALATION) at 08:01

## 2021-09-01 RX ADMIN — INSULIN ASPART 1 UNITS: 100 INJECTION, SOLUTION INTRAVENOUS; SUBCUTANEOUS at 17:05

## 2021-09-01 RX ADMIN — MONTELUKAST 10 MG: 10 TABLET, FILM COATED ORAL at 21:40

## 2021-09-01 RX ADMIN — GABAPENTIN 300 MG: 300 CAPSULE ORAL at 13:42

## 2021-09-01 RX ADMIN — BUPROPION HYDROCHLORIDE 75 MG: 75 TABLET, FILM COATED ORAL at 07:59

## 2021-09-01 RX ADMIN — CALCIUM CARBONATE 600 MG (1,500 MG)-VITAMIN D3 400 UNIT TABLET 1 TABLET: at 07:58

## 2021-09-01 RX ADMIN — BUPROPION HYDROCHLORIDE 75 MG: 75 TABLET, FILM COATED ORAL at 21:40

## 2021-09-01 RX ADMIN — ALLOPURINOL 300 MG: 300 TABLET ORAL at 17:04

## 2021-09-01 RX ADMIN — ACETAMINOPHEN 650 MG: 325 TABLET, FILM COATED ORAL at 10:27

## 2021-09-01 RX ADMIN — TACROLIMUS 4 MG: 1 CAPSULE ORAL at 07:59

## 2021-09-01 RX ADMIN — AMOXICILLIN AND CLAVULANATE POTASSIUM 1 TABLET: 875; 125 TABLET, FILM COATED ORAL at 21:40

## 2021-09-01 RX ADMIN — Medication 1 MG: at 22:21

## 2021-09-01 RX ADMIN — TACROLIMUS 4 MG: 1 CAPSULE ORAL at 18:22

## 2021-09-01 RX ADMIN — Medication 10 MG: at 21:40

## 2021-09-01 RX ADMIN — Medication 1 MG: at 10:27

## 2021-09-01 RX ADMIN — ANAKINRA 100 MG: 100 INJECTION, SOLUTION SUBCUTANEOUS at 21:39

## 2021-09-01 RX ADMIN — AMITRIPTYLINE HYDROCHLORIDE 25 MG: 25 TABLET, FILM COATED ORAL at 21:40

## 2021-09-01 RX ADMIN — LEVOFLOXACIN 750 MG: 750 TABLET, FILM COATED ORAL at 13:42

## 2021-09-01 RX ADMIN — GABAPENTIN 300 MG: 300 CAPSULE ORAL at 21:40

## 2021-09-01 RX ADMIN — PIPERACILLIN AND TAZOBACTAM 3.38 G: 3; .375 INJECTION, POWDER, LYOPHILIZED, FOR SOLUTION INTRAVENOUS at 02:41

## 2021-09-01 RX ADMIN — ASPIRIN 81 MG: 81 TABLET, COATED ORAL at 07:59

## 2021-09-01 RX ADMIN — GABAPENTIN 300 MG: 300 CAPSULE ORAL at 07:58

## 2021-09-01 RX ADMIN — UMECLIDINIUM 1 PUFF: 62.5 AEROSOL, POWDER ORAL at 07:59

## 2021-09-01 RX ADMIN — PIPERACILLIN AND TAZOBACTAM 3.38 G: 3; .375 INJECTION, POWDER, LYOPHILIZED, FOR SOLUTION INTRAVENOUS at 07:59

## 2021-09-01 RX ADMIN — PANTOPRAZOLE SODIUM 40 MG: 40 TABLET, DELAYED RELEASE ORAL at 07:59

## 2021-09-01 RX ADMIN — PREDNISONE 5 MG: 5 TABLET ORAL at 07:59

## 2021-09-01 RX ADMIN — FLUTICASONE FUROATE AND VILANTEROL TRIFENATATE 1 PUFF: 100; 25 POWDER RESPIRATORY (INHALATION) at 07:59

## 2021-09-01 ASSESSMENT — ACTIVITIES OF DAILY LIVING (ADL)
ADLS_ACUITY_SCORE: 13

## 2021-09-01 NOTE — PROGRESS NOTES
Cardiology Progress Note    Assessment & Plan   Mr. Jim Willingham is a 66yr old male with a history of NICM (s/p HM2 LVAD 6/2017), VT, AFib, CKD, DMII, COPD, now s/p OHT 5/6/21, who presented to the ER with shortness of breath and headache, found to have COVID PNA and KALI cavitary lesion.      Changes Today:  - Rheum consult, given recurrent gout/pseudogout concerns   - Restart PTA allopurinol   - Start anakinra 100 mg qday until discharge (given discharge anticipated on 9/2)   - If he goes home before 3 doses of anakinra, pt should take 0.6 mg BID colchicine until outpatient anakinra arrives  - Discussed with ID. Given Staph Aureus on bronch culture, transition from Zosyn to Augmentin for additional 2 weeks from 8/31 (EOT: 9/13)  - Follow up bronchoscopy studies.   - Giving significant gout related pain/symptoms in his left hand (his dominant hand), will wait for rheumatology impression, better control of gout symptoms before sending him home. Plan to discharge on 9/2     NICM, s/p OHT 5/6/21  His post-transplant course has been c/b right pleural air leak (required prolonged chest tube), pAFib, CAP (RLL, 6/2021), recurrent pleural effusions (s/p thoracenteses x2 6/2021 and 7/2021), RLL cavitary lesions (per chest CT 7/14/21, BD glucan indeterminate, aspergillus negative), pericardial effusion (1.4cm per TTE 7/12/21, conservatively managed), low-grade EBV viremia, and recurrent/persistent gout flare.     Rejection history:  none  AlloMap scores:  n/a  DSAs:  none  Coronary angio/Ischemic eval:  Deferred due to renal dysfunction  Last RHC:  8/5/21, mildly elevated biventricular filling pressures with RA 10, mPA 25, PCW 14, and CI 2.95.  Echo/cMRI:  TTE 8/5/21 showed stable graft function, with LVEF 60-65% and normal RV size/function, and trivial loculated pericardial effusion.  - RHC, biopsy 8/31     Serostatus:  - CMV D+/R+  - EBV D+/R+  - Toxo D-/R-     Immunosuppression:  - Continue baseline prednisone 5mg   - MMF  initially held given COVID (PTA dose 1500mg twice daily). Started back at 500 mg BID on 9/1/21 with plans for further management as an outpatient.  - continue tacrolimus, goal level 8-10 (tacro level 9.0 on 8/25).  Continue tacro 4mg twice daily (PTA dose)  - check tacrolimus levels daily while inpatient. Will check next tacrolimus level on Monday, 9/6, as an outpatient after discharge     PPx:  - CAV:  Aspirin 81mg daily.  Statin had been held due to elevated LFTs, defer restarting now.  - GI:  Pantoprazole 40mg daily  - Osteoporosis:  Calcium/vitamin D supplements  - CMV:  Completed 3m Valcyte therapy, level undetectable on admission  - PCP:  Received pentamidine 8/11, due q28 days  - Thrush:  Clotrimazole troches QID -- started 8/5 to augment tacro levels (stopped on 8/29, given elevated tacro level)  - Toxo:  n/a     Graft function:  - BPs:  Controlled  - HRs:  borderline Tachycardic, 80s-90s  - fluid status:  Euvolemic, not on diuretics       COVID-19 PNA  Presented with dyspnea and fevers. Found to be covid positive (x2). D-Dimer 3.27. Currently on RA.   - Lovenox 0.5mg/kg subcutaneous q12 hours  - S/p COVID-19 monoclonal antibodies x1 --- ok'd and recommended per ID, discussed with pharmacy  - S/p 5 days remdesivir 100mg daily  - CBC with diff trend     Recurrent exudative pleural effusions (6/2021, 7/2021)  KALI Cavitary Lung Lesion  The effusion was negative for fungal/bacterial/AFB cx.  Treated with IV ABx then levaquin.  Urine and serum Blasto and Histo Ag were negative, A galactomannan was negative, and BD glucan was intermediate. Repeat chest CT showed peripheral consolidative opacity in the left upper lobe which appears partially cavitating. Has associated pleuritic chest pain. 1,3 Beta D Glucan and Aspergillus galactomanan negative. Transplant ID reviewed imaging with radiology, and ultimately, based on complete impression, felt that it was most consistent with a bacterial etiology for pneumonia, with  some evidence it could be developing into an organizing pneumonia/abscess. Based on this, a more prolonged antibiotic course was recommended. Bronch cultures on 9/1 returned with staph aureus, felt to be MSSA given recent negative MRSA nares. Based on this, changed plan from levofloxacin to augmentin for finishing out antibiotic course. Yeast felt to be a contaminant.   - Transplant ID following, raulito torres   - Stopped micafungin   - Stopped Zosyn on 9/1   - Discussed with ID. Given Staph Aureus on bronch culture, transition from Zosyn to Augmentin for additional 2 weeks from 8/31 (EOT: 9/13)   - Repeat CT scan in 4-6 weeks   - Follow up with ID in clinic in 4-6 weeks  - Follow-up final bronchoscopy results.      EBV viremia  EBV quant <500 this admission.     Gout/pseudogout  Predated transplant.  Has had recurrent flares following transplant, and continues to follow with rheumatology.  He notes that his current flare started in his fingers, and has now progressed up to his elbow.  The pain limits his activity.  He has been treated with Anakinra, with positive results, but has not been approved for outpatient use by his insurance.  He recently completed a steroid burst per rheum (30mg x 4 days, 20mg x 4 days, 10mg x 4 days, then back to 5mg per transplant protocol), with little effect, so was advised to start a prednisone 40mg x 5 days today -- which he has not yet started.  He was given a one-time dose of hydrocortisone for adrenal insufficiency. He continues to complain of pain.    - S/p Anakinra 100mg SubQ qday for 3 days, with resolution of symptoms  - Patient with recurrent, severe left hand symptoms/pain on 9/1.   - Rheumatology consulted, appreciate brian   - Restart PTA allopurinol   - Start anakinra 100 mg qday until discharge (given discharge anticipated on 9/2)   - If he goes home before 3 doses of anakinra, pt should take 0.6 mg BID colchicine until outpatient anakinra arrives  - defer PTA  allopurinol therapy given active flare     Iron deficiency anemia  Iron sat 8/5 was 13%.  He was started on po iron, but note that absorption is impaired while on po PPI. S/p IV Venofer (8/26-8/30)     WALTER on CKD  Creatinine had been stable at 1.4-1.6, but has been elevated to 1.9-2s in the recent weeks.  Likely ddx include tacro therapy and mild hypovolemia.  He received IVF in the ED.  He is not on diuretics.  Will closely monitor tacro levels. Stable around 2, slightly downtrending.   - continue to trend BMP      Severe protein calorie malnutrition  History of Sylvester En Y gastric bypass  Albumin on arrival 2.6.  - nutrition consult      DMII:    PTA Lantus 18 units. Decreased to 10 units at bedtime while inpatient, and then held, due to AM hypoglycemia.  - Continue holding PTA Lantus, given well-controlled blood sugars in the hospital. Will have patient closely follow-up with PCP as an outpatient  Depression:  PTA wellbutrin 75mg BID  COPD:  PTA singulair, Trelegy Ellipta, Albuterol     Patient seen and discussed with Dr. Dg Del Rio [facility]  Internal Medicine Resident, PGY-2  Pager: [phone]    Interval History   No events overnight. Brief time period of shortness of breath after RHC/biopsy yesterday. Reports significant left hand pain this morning related to his gout. Denies any problems yesterday. Otherwise denies shortness of breath, chest pain this AM.     Physical Exam   Temp: 98  F (36.7  C) Temp src: Oral BP: 106/74 Pulse: 106   Resp: 18 SpO2: 98 % O2 Device: None (Room air)    Vitals:    08/28/21 0500 08/29/21 0412 08/31/21 0625   Weight: 89.5 kg (197 lb 5 oz) 90.7 kg (199 lb 15.3 oz) 88.7 kg (195 lb 8.8 oz)     Vital Signs with Ranges  Temp:  [97.4  F (36.3  C)-98.3  F (36.8  C)] 98  F (36.7  C)  Pulse:  [] 106  Resp:  [14-23] 18  BP: (106-175)/() 106/74  SpO2:  [93 %-100 %] 98 %  I/O last 3 completed shifts:  In: -   Out: 875 [Urine:875]     , Blood pressure 106/74, pulse 106,  "temperature 98  F (36.7  C), temperature source Oral, resp. rate 18, height 1.727 m (5' 8\"), weight 88.7 kg (195 lb 8.8 oz), SpO2 98 %.  195 lbs 8.77 oz  GEN:  Alert, oriented x 3, appears comfortable, NAD, lying down and resting in bed  HEENT: NC/AT, no scleral icterus  CV:  Regular rate and rhythm, no murmur, JVP 8 cm.  +S1, S2.  LUNGS:  Breathing comfortably. No crackles appreciated.   ABD:  +bowel sounds, soft, non-tender/non-distended.  No rebound/guarding/rigidity.  EXT:  No edema or cyanosis.  Warm. +pedal and radial pulses. Swelling most prominent around MCP joints on left hand with erythema and exquisite tenderness to palpation  NEURO: alert and oriented, moving all 4 extremities to command     Medications     ACE/ARB/ARNI NOT PRESCRIBED       BETA BLOCKER NOT PRESCRIBED         amitriptyline  25 mg Oral At Bedtime     aspirin  81 mg Oral Daily     buPROPion  75 mg Oral BID     calcium carbonate 600 mg-vitamin D 400 units  1 tablet Oral BID w/meals     fluticasone-vilanterol  1 puff Inhalation Daily     gabapentin  300 mg Oral TID     insulin aspart  1-7 Units Subcutaneous TID AC     insulin aspart  1-5 Units Subcutaneous At Bedtime     [Held by provider] insulin glargine  10 Units Subcutaneous At Bedtime     levofloxacin  750 mg Oral Q48H     melatonin  10 mg Oral At Bedtime     montelukast  10 mg Oral At Bedtime     multivitamin w/minerals  1 tablet Oral Daily     mycophenolate  500 mg Oral BID IS     pantoprazole  40 mg Oral QAM AC     predniSONE  5 mg Oral Daily     sodium chloride (PF)  3 mL Intracatheter Q8H     tacrolimus  4 mg Oral BID IS     umeclidinium  1 puff Inhalation Daily       Data   Recent Labs   Lab 09/01/21  1117 09/01/21  0805 09/01/21  0637 08/31/21  0958 08/31/21  0624 08/30/21  0627 08/28/21  0822 08/28/21  0633   WBC  --   --  5.0  --  4.9 3.8*   < > 2.6*   HGB  --   --  7.8* 8.5* 8.3* 7.4*   < > 7.5*   MCV  --   --  91  --  92 90   < > 88   PLT  --   --  288  --  275 234   < > 222 "   NA  --   --  138  --  138 136   < > 137   POTASSIUM  --   --  3.4  --  4.0 3.3*   < > 3.4   CHLORIDE  --   --  107  --  108 108   < > 110*   CO2  --   --  25  --  24 24   < > 25   BUN  --   --  9  --  10 12   < > 23   CR  --   --  1.54*  --  1.65* 1.57*   < > 1.89*   ANIONGAP  --   --  6  --  6 4   < > 2*   QAMAR  --   --  7.6*  --  7.8* 7.8*   < > 7.7*   * 104* 107*  --  116* 131*   < > 64*   ALBUMIN  --   --   --   --   --   --   --  1.9*   PROTTOTAL  --   --   --   --   --   --   --  5.4*   BILITOTAL  --   --   --   --   --   --   --  0.3   ALKPHOS  --   --   --   --   --   --   --  77   ALT  --   --   --   --   --   --   --  10   AST  --   --   --   --   --   --   --  11    < > = values in this interval not displayed.       No results found for this or any previous visit (from the past 24 hour(s)).

## 2021-09-01 NOTE — PROGRESS NOTES
[facility]  Transplant Infectious Disease Progress Note     Patient:  Jim Willingham, Date of birth 1955, Medical record number [id number]  Date of Visit:  09/01/2021         Assessment and Recommendations:   66 year old male with NICMP s/p LVAD in 2017 and OHT in 5/2021 who presented with COVID 19 and KALI consolidative and cavitary lesion.     1. Mild to moderate COVID pneumonia + KALI cavitary consolidation: tested positive for COVID on 8/24. Received 5 days of remdesivir. CT chest showed KALI consolidative opacity w/ partial cavitation and multiple pulmonary nodules. Previous RLL cavitary lesion resolved. Reviewed CT with radiology-the presence of bronchograms w/o invading into the chest wall is suggestive of a bacterial etiology. It does not appear typical for Aspergillus pneumonia. There is evidence that this could be developing into an organizing pneumonia or abscess. Blood cultures have been negative and 8/28 TTE w/o significant valvular abnormalities making septic emboli less likely.  Serum Asp gm and 1,3 BD glucan, CRAG, MRSA nares negative. 8/30 BAL Asp gm negative. Bronchoscopy culture has grown Staphylococcus aureus and yeast. Pending tests include AFB, fungal culture, nocardia culture, Legionella, respiratory PCR, CMV PCR, actinomyces culture, PJP and explify.     2. Leukopenia: Likely multifactorial. ANC okay.    Previous ID problems:  1. RUPINDER hominis BSI in 9/2020 treated with vancomycin for 6 weeks, presumed to be LVAD related.  2. MRSE in a hand joint in broth only deemed a contaminant on 7/8/2021 with inflamed joint due to acute pseudogout.   3. RLL pneumonia with exudative pleural effusion in 7/2021. The effusion was negative for fungal/bacterial/AFB cx. Treated with IV ABx then levaquin. Urine and serum Blasto and Histo Ag were negative, A galactomannan was negative for BD glucan was intermediate. No antifungal therapy was given with repeat CT 7/23/2021 showed  improvement.  4. H influenzae pneumonia (1/2020)    Other Infectious Diseases Concerns Include:  - QTc interval: QTc 420  - Bacterial prophylaxis: on treatment  - PCP prophylaxis:pentamidine 8/11  - Viral serostatus & prophylaxis:CMV D+/R+, EBV D+/R+, HSV1+/2-, VZV +, Toxo D-/R-  - Fungal prophylaxis: on treatment/  - Immunization status: candidate for prevnar then the pneumovax vaccines, shingrix vaccine and the COVID-19 vaccine but when he is not on such a high dose of prednisone.    - Immunoglobulin status: IgG 838  - Isolation status: COVID special precautions  - Immunosuppression: Basiliximab for induction. TAC/MMF (held)/pred    Recommendations:  1. Staphylococcus aureus isolated on BAL culture. MRSA nares negative. Will provide coverage for MSSA but susceptibilities are pending.  2. Stop levofloxacin  3. Start amox 875/ clav 125 po BID x 2 weeks from 8/30-9/2  4. Repeat CT chest in 4-6 weeks-please order and arrange at the time of discharge  5. Will arrange ID follow up in 4-6 weeks  6. Please alert me if there are more abnormal results on the bronchoscopy  7. Pending tests include AFB, fungal culture, nocardia culture, Legionella, respiratory PCR, CMV PCR, actinomyces culture, PJP and explify.     Kateryna Londono DO.   Pager [phone]  Infectious Diseases Staff        Interval History:   Afebrile. Feels about the same as yesterday. Denies subjective fevers, chills, nausea, vomiting.   Had a penicillin allergy when he was a baby. Per the patient he has tolerated amoxicillin in the past. Has received zosyn w/o issues.       Transplants:  5/6/2021 (Heart), Postoperative day:  118.  Coordinator Yolette Rueda    Review of Systems:Remaining systems all reviewed     Antimicrobial History  Micafungin 8/25-8/30  Pip/tazo 8/25-8/30  Remdesivir 8/24-8/28  Vancomycin 8/25  Levofloxacin 9/1    Immunosuppression prednisone 5, tacrolimus         Current Medications & Allergies:       allopurinol  300 mg Oral Daily      amitriptyline  25 mg Oral At Bedtime     amoxicillin-clavulanate  1 tablet Oral Q12H ELI     anakinra  100 mg Subcutaneous Daily     aspirin  81 mg Oral Daily     buPROPion  75 mg Oral BID     calcium carbonate 600 mg-vitamin D 400 units  1 tablet Oral BID w/meals     fluticasone-vilanterol  1 puff Inhalation Daily     gabapentin  300 mg Oral TID     insulin aspart  1-7 Units Subcutaneous TID AC     insulin aspart  1-5 Units Subcutaneous At Bedtime     [Held by provider] insulin glargine  10 Units Subcutaneous At Bedtime     melatonin  10 mg Oral At Bedtime     montelukast  10 mg Oral At Bedtime     multivitamin w/minerals  1 tablet Oral Daily     mycophenolate  500 mg Oral BID IS     pantoprazole  40 mg Oral QAM AC     predniSONE  5 mg Oral Daily     sodium chloride (PF)  3 mL Intracatheter Q8H     tacrolimus  4 mg Oral BID IS     umeclidinium  1 puff Inhalation Daily       Infusions/Drips:    ACE/ARB/ARNI NOT PRESCRIBED       BETA BLOCKER NOT PRESCRIBED         Allergies   Allergen Reactions     Grass Shortness Of Breath     Ace Inhibitors Cough     Dust Mites Other (See Comments)     Asthma     Mold Other (See Comments)     Asthma     Penicillins Other (See Comments)     Unknown - childhood exposure    Tolerated Zosyn 9/18-9/20/2020     Sulfa Drugs Other (See Comments) and Unknown     Unknown childhood reaction            Physical Exam:   Ranges for vital signs:  Temp:  [97.4  F (36.3  C)-98.2  F (36.8  C)] 98  F (36.7  C)  Pulse:  [] 106  Resp:  [14-23] 18  BP: (106-175)/() 106/74  SpO2:  [97 %-100 %] 98 %  Vitals:    08/28/21 0500 08/29/21 0412 08/31/21 0625   Weight: 89.5 kg (197 lb 5 oz) 90.7 kg (199 lb 15.3 oz) 88.7 kg (195 lb 8.8 oz)       Physical Examination:  GENERAL:in bed. in no acute distress. Pleasant.   HEAD:  Head is normocephalic, atraumatic   EYES:  Eyes have anicteric sclerae without conjunctival injection.  LUNGS:  No accessory muscle use. Not on oxygen.   EXTREMITIES: right hand  swelling over the PIP joints.  NEUROLOGIC:  Grossly nonfocal. Active x4 extremities. Alert. Oriented.          Laboratory Data:     Metabolic Studies       Recent Labs   Lab Test 09/01/21  1117 09/01/21  0637 08/31/21  0624 08/25/21  1645 08/25/21  1427 08/24/21  1758 08/24/21  0613 08/24/21  0559 08/23/21  0918 08/05/21  0931 07/09/21  0632 07/08/21  1533 06/24/21  0905 06/21/21  0933 06/07/21  0807 05/05/21  0628 05/04/21  2313   NA  --  138 138   < >  --    < > 136  --   --  137   < > 134  --  131* 140  --  135   POTASSIUM  --  3.4 4.0   < >  --    < > 4.1  --   --  4.3   < > 4.6  --  4.8 4.2  --  4.0   CHLORIDE  --  107 108   < >  --    < > 106  --   --  107   < > 104  --  97 105  --  101   CO2  --  25 24   < >  --    < > 26  --   --  24   < > 22  --  26 30  --  26   ANIONGAP  --  6 6   < >  --    < > 4  --   --  6   < > 7  --  8 5  --  7   BUN  --  9 10   < >  --    < > 32*  --   --  27   < > 37*  --  46* 41*  --  50*   CR  --  1.54* 1.65*   < >  --    < > 1.91*  --   --  1.45*   < > 2.37*  --  2.31* 1.93*  --  1.60*   GFRESTIMATED  --  47* 43*   < >  --    < > 36*  --   --  51*   < > 28*  --  29* 36*  --  45*   * 107* 116*   < >  --    < > 192*  --    < > 85   < > 133*  --  285* 128*  --  115*   A1C  --   --   --   --   --   --   --   --   --   --   --   --   --   --   --   --  5.1   QAMAR  --  7.6* 7.8*   < >  --    < > 8.0*  --   --  8.2*   < > 7.9*  --  8.6 8.2*  --  9.1   PHOS  --   --   --   --   --   --   --   --   --  2.4*  --   --   --  3.7 3.7   < > 3.8   MAG  --  1.6 1.9   < >  --    < > 1.5*  --   --  1.8   < >  --   --  1.9 1.7  --  2.3   LACT  --   --   --   --   --   --   --  0.8  --   --   --  1.0   < >  --   --    < >  --    PCAL  --   --   --   --   --   --  0.16  --   --   --   --  0.18   < >  --   --    < >  --    FGTL  --   --   --   --  <31  --   --   --   --   --    < >  --    < >  --   --    < >  --    CKT  --   --   --   --   --   --   --   --   --   --   --   --   --  31 49  --    --     < > = values in this interval not displayed.       Hepatic Studies    Recent Labs   Lab Test 08/28/21  0633 08/24/21  0613 08/05/21  0931 07/13/21  2053 07/10/21  0624   BILITOTAL 0.3 0.6 0.5  --   --    DBIL 0.1  --   --   --   --    ALKPHOS 77 122 281*  --   --    PROTTOTAL 5.4* 6.2* 6.4*  --  5.3*   ALBUMIN 1.9* 2.6* 2.9*  --   --    AST 11 10 12  --   --    ALT 10 17 36  --   --    LDH  --   --   --  252* 220       Hematology Studies      Recent Labs   Lab Test 09/01/21  0637 08/31/21  0624 08/30/21  0627 08/30/21  0627 08/29/21  0825 08/28/21  0633 08/27/21  0550 08/27/21  0550   WBC 5.0 4.9  --  3.8* 3.6* 2.6*  --  2.6*   ANEU 2.0 1.7  --  1.8 1.7  --   --  1.3*   ALYM 1.5 1.7  --  1.2 0.9  --   --  0.7*   VIJAY 1.1 0.9  --  0.6 0.8  --   --  0.4   AEOS 0.0 0.2  --  0.2 0.1  --   --  0.1   HGB 7.8* 8.3*   < > 7.4* 7.7* 7.5*   < > 7.1*   HCT 26.6* 28.0*  --  25.2* 25.3* 24.9*   < > 23.7*    275  --  234 253 222   < > 226    < > = values in this interval not displayed.       Arterial Blood Gas Testing    Recent Labs   Lab Test 08/24/21  1142 08/24/21  0559 06/28/21  2247 05/09/21  0956 05/09/21  0902 05/07/21  1900 05/07/21  1700 05/07/21  1420 05/07/21  0340 05/06/21  2152   PH  --  7.40  --   --   --  7.40 7.38 7.39 7.38 7.37   PCO2  --   --   --   --   --  38 41 40 41 44   PO2  --   --   --   --   --  101 102 130* 153* 148*   HCO3  --   --   --   --   --  23 24 24 24 25   O2PER 99  --  21.0 21  21 REPORT AMENDED: 2LNC Canceled, Test credited  2L 40 40.0  40.0 40  40        Medication levels    Recent Labs   Lab Test 09/01/21  0637 05/13/21  0534 05/08/21 2004   VANCOMYCIN  --   --  18.5   TACROL 6.7   < >  --     < > = values in this interval not displayed.       Inflammatory Markers    Recent Labs   Lab Test 08/27/21  0550 07/19/21  0630 07/18/21  0552 07/16/21  0614 07/09/21  0632 07/08/21  1746 07/08/21  1533 10/27/20  1250 10/20/20  1305 10/13/20  1320 10/06/20  1320 09/30/20  1130   SED   --   --   --   --   --  72*  --  10 8 10 20 9   .0* 13.0* 7.7 4.2 98.0*  --  87.0* 3.5 11.0* 12.0* 5.7 0.6*       Immune Globulin Studies     Recent Labs   Lab Test 12/21/20  1133      IGM 55          Pancreatitis testing    Recent Labs   Lab Test 05/26/21  1340 05/04/21  2313 11/05/20  0907 08/05/20  0335   AMYLASE 49 93  --   --    LIPASE 25*  --   --  168   TRIG  --   --  91  --        Gout Labs      Recent Labs   Lab Test 08/24/21  0613 03/12/21  0605 02/01/21  1127 01/09/21  0538 12/21/20  1133   URIC 5.3 4.8 3.9 5.0 4.5       Clotting Studies    Recent Labs   Lab Test 07/19/21  0630 07/18/21  0552 07/17/21  0626 07/16/21  0614 05/07/21  0340 05/06/21  0650   INR 1.16* 1.11 1.12 1.07   < > 1.45*   PTT  --   --   --   --   --  35    < > = values in this interval not displayed.       Iron Testing    Recent Labs   Lab Test 09/01/21  0637 08/05/21  0931 05/30/21  0526 05/29/21  0606 05/13/21  0534 02/09/21  0518   IRON  --  30*  --   --  38 28*   FEB  --  240  --   --  215* 285   IRONSAT  --  13*  --   --  18 10*   VITA  --  98  --   --  98 33   MCV 91 95   < > 96 94  --    FOLIC  --   --   --   --  14.4  --    B12  --   --   --   --  734  --    HAPT  --   --   --  110  --   --     < > = values in this interval not displayed.       Thyroid Studies     Recent Labs   Lab Test 07/09/21  0632 02/01/21  1127 12/18/20  1018 06/24/20  0747 07/31/19  1050   TSH 3.16 3.52 3.45 1.30 1.23       Body fluid stats    Recent Labs   Lab Test 08/30/21  1103 08/30/21  1103 07/11/21  1539 07/10/21  1507 07/08/21  2114 06/29/21  1710 06/29/21  1710   FTYP  --   --   --  Pleural fluid Canceled, Test credited  --  Pleural fluid   FCOL Colorless  --  Red* Red  --   --  Bloody   FAPR Clear  --  Clear Cloudy  --    < > Turbid   FWBC 3  --  330 384  --   --  770   FNEU  --   --  10 52  --   --  42   FLYM  --   --  81 28  --   --  42   FMONO  --  100 9 20  --    < > 16   FTP  --   --   --  2.7  --   --  2.9   GS  --   --    --   --  No organisms seen  Many  WBC'S seen  predominantly PMN's    --  No organisms seen  Few  WBC'S seen    Many  Red blood cells seen      < > = values in this interval not displayed.       Microbiology:  Fungal testing  Recent Labs   Lab Test 08/30/21  1103 08/25/21  1427 07/14/21  1633 06/29/21  1412 06/28/21  2208 05/25/21  1627   ASPI  --   --   --  None Detected  --   --    FGTL  --  <31 67  --  53 <31   ASPGAI 0.08 0.05 0.05  --  0.06 0.08   ASPAG Negative  --   --   --   --   --    ASPGAA  --  Negative Negative  --  Negative Negative       Last Culture results with specimen source    8/30 BAL culture: yeast and Staph aureus    Culture Micro   Date Value Ref Range Status   07/09/2021 No growth  Final   07/08/2021 (A)  Preliminary    On day 2, isolated in broth only:  Staphylococcus epidermidis     07/08/2021   Preliminary    Critical Value/Significant Value, preliminary result only, called to and read back by  Neli Boswell RN 1527 7/10/21 AM      07/08/2021 No anaerobes isolated  Final   07/08/2021   Final    Since this specimen was not transported in the proper anaerobic transport media, the   absence of anaerobes in this culture does not rule out the presence of anaerobes in this   specimen.     07/08/2021 No growth  Final   07/08/2021 No growth  Final   06/30/2021 No growth  Final   06/30/2021 No growth  Final   06/29/2021 No growth  Final   06/29/2021 No anaerobes isolated  Final   06/29/2021 Culture negative after 4 weeks  Final   06/29/2021 Culture negative for acid fast bacilli  Final   06/29/2021   Final    Assayed at [facility] [address] [place] [id number] [phone]   06/29/2021 No growth after 4 weeks  Final   06/28/2021 No growth  Final        Virology:  Respiratory virus testing    Recent Labs   Lab Test 08/30/21  1103 07/08/21  1627 06/29/21  0914 03/05/21  1655 02/28/21  1020 02/28/21  1020 05/08/20  0910 01/15/20  2210   AFLU  --   --   --   --   --   --   --   Negative   IFLUA Negative  --  Not Detected Not Detected  --  Not Detected   < > Not Detected   INFZA  --   --   --   --   --  Negative  --   --    FLUAH1 Negative  --  Not Detected Not Detected  --  Not Detected   < > Not Detected   [id number] Negative  --  Not Detected Not Detected  --  Not Detected   < > Not Detected   FLUAH3 Negative  --  Not Detected Not Detected  --  Not Detected   < > Not Detected   BFLU  --   --   --   --   --   --   --  Positive*   IFLUB Negative  --  Not Detected Not Detected  --  Not Detected   < > Detected, Abnormal Result*   INFZB  --   --   --   --   --  Negative  --   --    PIV1 Negative  --  Not Detected Not Detected  --  Not Detected   < > Not Detected   PIV2 Negative  --  Not Detected Not Detected  --  Not Detected   < > Not Detected   PIV3 Negative  --  Not Detected Not Detected  --  Not Detected   < > Not Detected   PIV4  --   --  Not Detected Not Detected  --  Not Detected   < > Not Detected   HRVS Negative  --   --   --   --   --   --   --    RSVA Negative  --  Not Detected Not Detected  --  Not Detected   < > Not Detected   RSVB Negative  --  Not Detected Not Detected  --  Not Detected   < > Not Detected   HMPV Negative  --  Not Detected Not Detected  --  Not Detected   < > Not Detected   ADVBE Negative  --   --   --   --   --   --   --    ADVC Negative  --   --   --   --   --   --   --    ADENOV  --   --  Not Detected Not Detected  --  Not Detected   < > Not Detected   CORONA  --   --  Not Detected Not Detected  --  Not Detected   < > Not Detected   [id number]  --  Nasopharyngeal  --   --    < >  --   --   --     < > = values in this interval not displayed.       Log IU/mL of CMVQNT   Date Value Ref Range Status   06/29/2021 Not Calculated <2.1 [Log_IU]/mL Final   06/28/2021 Canceled, Test credited <2.1 [Log_IU]/mL Final     Comment:     Quantity not sufficient  NOTIFIED VIA [facility] TRACK BOARD AT 8729 ON 6/28/21 ELK     06/07/2021 Not Calculated <2.1 [Log_IU]/mL Final       EBV DNA  Copies/mL   Date Value Ref Range Status   08/24/2021 <500 (A) Not Detected copies/mL Final     Comment:     EBV DNA Detected below the reportable range of 500 copies/mL   08/05/2021   Final     Comment:     EBV DNA Detetected below the reportable range of 500 copies/mL   06/28/2021 EBV DNA Not Detected EBVNEG^EBV DNA Not Detected [Copies]/mL Final   06/07/2021 3,509 (A) EBVNEG^EBV DNA Not Detected [Copies]/mL Final     Urine Studies     Recent Labs   Lab Test 08/24/21  0726 07/09/21  0640 06/30/21  0640 05/25/21  1230 05/16/21  0528   URINEPH 7.0 5.5 5.0 5.5 5.5   NITRITE Negative Negative Negative Negative Negative   LEUKEST Negative Negative Negative Negative Negative   WBCU 2 3 4 4 1     Imaging:  Recent Results (from the past 48 hour(s))   Cardiac Catheterization    Narrative      Right sided filling pressures are mildly elevated.    Mild elevated pulmonary hypertension.    Left sided filling pressures are mildly elevated.    Normal cardiac output level.    Hemodynamic data has been modified in Epic per physician review.    Successful endomyocardial biopsy. Results pending.

## 2021-09-01 NOTE — PROGRESS NOTES
Transplant Social Work Services Progress Note      Date of Initial Social Work Evaluation: 8/31/2021  Collaborated with: Anil 2 and Pt, via telephone    Data: Pt is s/p heart transplant on 5/6/2021. Pt admitted 8/24 positive for Covid w/ symptoms. Pt is nearing readiness to discharge, possibly tomorrow. Writer was able to reach pt on his room phone. He reports his wife did test positive for Covid, but she has been able to remain at home; reports she is starting to feel better. Pt feels he is safe to discharge home w/ resumption of [facility] services.     Intervention: Supportive Phone Call, Discharge Planning   Assessment: Pt is anticipating discharge home tomorrow. Pt's wife also recently diagnosed with Covid and she is at home.   Education provided by SW: Ongoing Social Work support   Plan:    Discharge Plans in Progress: Anticipate home tomorrow     Barriers to d/c plan: Medical Readiness     Follow up Plan: SW to continue to follow.

## 2021-09-01 NOTE — PLAN OF CARE
Neuro: A&Ox4. Able to make needs known. Follows commands. N/T in extremities at baseline.    Cardiac: SR/ST HR 90s-100s. BP elevated this shift. See flow sheets for details.  Respiratory: Sating >94% on RA. Using CPAP at HS. BLL crackles. No change in SOB from previous shift. Team notified/aware.   GI/: Adequate urine output. BM x2. Loose stools. Continent.   Diet/appetite: Regular diet   Activity:  SBA to BSC. Independently transfer to EOB. Calls appropriately.   Pain: At acceptable level on current regimen.   Skin: Generalized bruising.   LDA's: PIV R arm running TKO.     Plan: Continue with POC. Notify primary team with changes.

## 2021-09-01 NOTE — PLAN OF CARE
Neuro: A&Ox4. pleasant.   Cardiac: SR/ST. VSS.       Respiratory: Sating > 93% on RA.  GI/: Adequate urine output per urinal. BM X2. Loose stools.  Diet/appetite: Tolerating regular diet. Poor appetite.  Activity:  Assist of 1 w/ walker, up to chair.   Pain: c/o left hand pain d/t gout flare up. PRN dilaudid and ultram given w/ no relief.   Skin: Left hand knuckles swollen d/t gout flare up.  LDA's: left piv.     Plan:  Continue with POC. Notify primary team with changes.

## 2021-09-01 NOTE — CONSULTS
Rheumatology Consult Note-Resident    Jim Willingham MRN# [id number]   Age: 66 year old YOB: 1955     Date of Admission: 8/24/2021    Reason for consult: Pt w/ history of difficult to control gout, followed by rheum as an outpatient. S/p anakinra x3 days earlier in admission, but sxs have returned. Pt otherwise ready for d/c. Add'l mgmt recs for gout    Requesting Physician: Karthik Wilson MD      Assessment & Plan:     ASSESSMENT:  Jim Willingham is a 66 year old male with a history of gout, NICM (EF 20%) s/p OHT 5/6/21, afib, RV failure, CKD3, DM2 c/b neuropathy, CECILIA, HTN, asthma who is admitted for SOB 2/2 COVID PNA and KALI cavitary lesion. Rheumatology was consulted for management recs re: gout flare.    Pt with long-standing difficult to manage gout/pseudogout. Diagnosis confirmed on L 2nd MCP joint aspiration 7/2021 with CPPD crystals (?and possibly monosodium urate crystals as well, unclear) and given hx, pt likely has underlying gout as well. Clinical picture and acute inflammatory arthritis on exam are consistent with gout/pseudogout, no concern for other forms of inflammatory arthritis at this time. He has likely had increased flares over last several months in setting of frequent prolonged hospitalizations, interrupted allopurinol dosing, and intermittent diuretic use. Pt has history of inadequate response to steroids and moderate response to colchicine. He responds well to anakinra, including 3 day treatment 8/26-28 this hospitalization, but gout/pseudogout has since recurred. Have previously not been able to use anakinra as outpatient while insurance approval was pending - now approved. Would recommend treating with another 3 day course of anakinra. This can be started as an inpatient and depending on if anakinra is available as outpatient, course could completed as an outpatient or could use colchicine as a bridge to outpatient anakinra. Given more recent studies, continuing PTA  allopurinol despite ongoing flare will likely provide longer-term benefit without prolonging flare so allopurinol should be restarted.    DIAGNOSIS:  1. Acute on chronic gout/pseudogout  2. COVID pneumonia  3. KALI cavitary lesion  4. NICM s/p OHT (5/2021)    PLAN:  - Anakinra 100 mg qday subcutaneous today and tomorrow (and if still inpatient, pt can complete 3 day course)  - Restart PTA allopurinol 300 mg qday  - We will talk with rheum clinic coordinators re: status of outpatient anakinra (pt was approved but unclear when doses will be available at home)  - If pt discharges and doesn't have anakinra available at home, would recommend outpatient PO colchicine 0.6 mg BID until outpatient anakinra arrives   > We believe there is little to no risk of bone marrow suppression with this oral dosing of colchicine and thus benefits outweigh risks  - We will arrange for close outpatient follow-up in rheum clinic (with Dr. Pettit)      I discussed the findings and recommendations with the patient.  I communicated the assessment and plan to the consulting team.    Case seen and discussed with Dr. Crowley who agrees with above assessment and plan.     Thank you Duran Mccarty, for for this interesting consult. Please contact us if there are any questions.     Hallie Mays MD  Internal Medicine PGY-2    I saw this patient with the medical resident. I agree with the stated findings and recommendations which reflect our joint impressions and plan.    Leonel Crowley M.D.  Staff Rheumatologist, [facility]  Pager [phone]        [facility]     Jim Willingham is a 66 year old male with a history of gout, NICM (EF 20%) s/p OHT 5/6/21, afib, RV failure, CKD3, DM2 c/b neuropathy, CECILIA, HTN, asthma who is admitted for SOB 2/2 COVID PNA and KALI cavitary lesion. Rheumatology was consulted for management recs re: gout flare.    Pt has history of difficult to control gout flares, progressively worsening over past 6 months. Gout  has previously affected multiple joints (bilateral toes and ankles, knees) but recently it's predominately affected his L 1-3rd MCP and L wrist. Gout largely resistant to treatment with steroids but has had good response to Anakinra including multiple rounds of treatment in March/April and July 2021 while hospitalized. During July hospitalization, pt had L hand x-rays which showed chondrocalcinosis and capsular calcifications of L 2nd and 3rd MCPs, had 2nd MCP joint aspiration with CPPD crystals visualized (+/- MSU crystals according to comment, unclear). Pt did well for a while after discharge from hospital in July but has since had fairly constant L hand and wrist pain and swelling. Received 1.2 mg of colchicine x1 with moderate benefit (previous reports were that colchicine was used without benefit earlier this year). Pt was seen in rhematology clinic 8/12 and was started on a prednisone taper (30mg x 4 days, 20mg x 4 days, 10mg x 4 days, then back to 5mg daily per transplant protocol) with very little benefit. Rheum clinic had been working on insurance approval for outpatient anakinra but it was not approved at that point.     L hand gout/pseudogout flared again on day prior to admission, pt was instructed to start 5 day prednisone 40 mg burst but was hospitalized for SOB (d/t COVID and KALI cavitary lesion) before he could start that. He got one dose of stress-dose hydrocortisone without benefit and has since been on PTA dose of prednisone 5 mg qday. He got monoclonal ab and remdesivir for covid and IV antibiotics for pneumonia. Rheumatology was curb-sided last week as pt had progressive L 1st-3rd MCP and wrist pain and swelling and was given 3 days of anakinra 100 mg qday (8/26-28) with good results and near complete resolution of pain and swelling. However, significant L hand/wrist pain returned 9/1 AM. Pt reports worsening of L 2nd and 3rd MCP pain and swelling, mild wrist pain. Pain and swelling are limiting  his daily activities, pt is left handed. No fevers/chills, redness to joints. No other joint pain or swelling at this time. PTA allopurinol has been held by primary team during admit in setting of gout flare.    Of note, per notes from 8/25, outpatient anakinra prior authorization has now been approved.    Patient denies any fevers, chills, weight loss, vision changes, headaches, focal weakness or numbness.  Denies any Raynaud's, myalgias,  Alopecia, rash, photosensitivity, nasal or oral ulcers, ear fullness or drainage.   No hematuria, no history of blood clots.       HISTORY REVIEW:  Past Medical History:   Diagnosis Date    Bariatric surgery status 2003    Benign essential hypertension 05/11/2017    Bilateral carpal tunnel syndrome 11/02/2020    Cardiomyopathy, unspecified (H) 05/08/2017    CKD (chronic kidney disease) stage 3, GFR 30-59 ml/min 05/11/2017    COPD (chronic obstructive pulmonary disease) (H) 11/02/2020    Depression 05/11/2017    Diabetes mellitus (H) 1995    Gouty arthropathy, chronic, without tophi 11/02/2020    H/O gastric bypass 05/11/2017    ICD (implantable cardioverter-defibrillator), biventricular, in situ 05/11/2017    LVAD (left ventricular assist device) present (H)     Major depression, recurrent, chronic (H) 11/02/2020    NICM (nonischemic cardiomyopathy) (H)/ EF 20% 05/11/2017    ECHO: LVEDd. 7.66 cm, Restrictive pattern , Severe mitral valve regurgitation    CECILIA (obstructive sleep apnea) 05/11/2017    Paroxysmal atrial fibrillation (H) 05/11/2017    Paroxysmal VT (H) 05/11/2017    Rotator cuff tear arthropathy of both shoulders 11/02/2020    Uncomplicated asthma     Vitamin B12 deficiency (non anemic) 05/11/2017     Past Surgical History:   Procedure Laterality Date    ANESTHESIA CARDIOVERSION N/A 05/11/2020    Procedure: ANESTHESIA, FOR CARDIOVERSION @1100;  Surgeon: GENERIC ANESTHESIA PROVIDER;  Location: [facility] OR    BRONCHOSCOPY (RIGID OR FLEXIBLE), DIAGNOSTIC N/A 8/30/2021     Procedure: BRONCHOSCOPY, WITH BRONCHOALVEOLAR LAVAGE;  Surgeon: Perlman, David Morris, MD;  Location: [facility]    CV HEART BIOPSY N/A 5/13/2021    Procedure: Heart Biopsy;  Surgeon: Scout Robins MD;  Location:  HEART CARDIAC CATH LAB    CV HEART BIOPSY N/A 5/20/2021    Procedure: Heart Biopsy;  Surgeon: Jeffrey Gibson MD;  Location:  HEART CARDIAC CATH LAB    CV HEART BIOPSY N/A 5/27/2021    Procedure: Heart Biopsy;  Surgeon: Jac Dover MD;  Location:  HEART CARDIAC CATH LAB    CV HEART BIOPSY N/A 6/7/2021    Procedure: CV HEART BIOPSY;  Surgeon: Jeffrey Gibson MD;  Location:  HEART CARDIAC CATH LAB    CV HEART BIOPSY N/A 6/21/2021    Procedure: CV HEART BIOPSY;  Surgeon: Scout Robins MD;  Location:  HEART CARDIAC CATH LAB    CV HEART BIOPSY N/A 7/5/2021    Procedure: CV HEART BIOPSY;  Surgeon: Jeffrey Gibson MD;  Location:  HEART CARDIAC CATH LAB    CV HEART BIOPSY N/A 7/16/2021    Procedure: Heart Biopsy;  Surgeon: Amadeo Art MD;  Location:  HEART CARDIAC CATH LAB    CV HEART BIOPSY N/A 8/5/2021    Procedure: Heart Biopsy;  Surgeon: Jeffrey Gibson MD;  Location:  HEART CARDIAC CATH LAB    CV HEART BIOPSY N/A 8/31/2021    Procedure: Heart Cath Heart Biopsy;  Surgeon: Jeffrey Gibson MD;  Location:  HEART CARDIAC CATH LAB    CV RIGHT HEART CATH MEASUREMENTS RECORDED N/A 07/24/2019    Procedure: CV RIGHT HEART CATH;  Surgeon: Renu Sears MD;  Location:  HEART CARDIAC CATH LAB    CV RIGHT HEART CATH MEASUREMENTS RECORDED N/A 08/05/2020    Procedure: CV RIGHT HEART CATH;  Surgeon: Nicola Seth MD;  Location:  HEART CARDIAC CATH LAB    CV RIGHT HEART CATH MEASUREMENTS RECORDED N/A 01/07/2021    Procedure: CV RIGHT HEART CATH;  Surgeon: Jac Dover MD;  Location:  HEART CARDIAC CATH LAB    CV RIGHT HEART CATH MEASUREMENTS RECORDED N/A 02/23/2021    Procedure: Heart  Cath Right Heart Cath;  Surgeon: Jeffrey Gibson MD;  Location:  HEART CARDIAC CATH LAB    CV RIGHT HEART CATH MEASUREMENTS RECORDED N/A 03/23/2021    Procedure: Heart Cath Right Heart Cath. request for 3/23;  Surgeon: Jeffrey Gibson MD;  Location:  HEART CARDIAC CATH LAB    CV RIGHT HEART CATH MEASUREMENTS RECORDED N/A 5/13/2021    Procedure: Right Heart Cath;  Surgeon: Scout Robins MD;  Location:  HEART CARDIAC CATH LAB    CV RIGHT HEART CATH MEASUREMENTS RECORDED N/A 5/20/2021    Procedure: Right Heart Cath;  Surgeon: Jeffrey Gibson MD;  Location:  HEART CARDIAC CATH LAB    CV RIGHT HEART CATH MEASUREMENTS RECORDED N/A 5/27/2021    Procedure: Right Heart Cath;  Surgeon: Jac Dover MD;  Location:  HEART CARDIAC CATH LAB    CV RIGHT HEART CATH MEASUREMENTS RECORDED N/A 6/7/2021    Procedure: CV RIGHT HEART CATH;  Surgeon: Jeffrey Gibson MD;  Location:  HEART CARDIAC CATH LAB    CV RIGHT HEART CATH MEASUREMENTS RECORDED N/A 6/21/2021    Procedure: CV RIGHT HEART CATH;  Surgeon: Scout Robins MD;  Location:  HEART CARDIAC CATH LAB    CV RIGHT HEART CATH MEASUREMENTS RECORDED N/A 7/5/2021    Procedure: CV RIGHT HEART CATH;  Surgeon: Jeffrey Gibson MD;  Location:  HEART CARDIAC CATH LAB    CV RIGHT HEART CATH MEASUREMENTS RECORDED N/A 7/16/2021    Procedure: Right Heart Cath;  Surgeon: Amadeo Art MD;  Location:  HEART CARDIAC CATH LAB    CV RIGHT HEART CATH MEASUREMENTS RECORDED N/A 8/5/2021    Procedure: CV RIGHT HEART CATH;  Surgeon: Jeffrey Gibson MD;  Location:  HEART CARDIAC CATH LAB    CV RIGHT HEART CATH MEASUREMENTS RECORDED N/A 8/31/2021    Procedure: Heart Cath Right Heart Cath;  Surgeon: Jeffrey Gibson MD;  Location:  HEART CARDIAC CATH LAB    GI SURGERY  2003    [facility]    INSERT VENTRICULAR ASSIST DEVICE LEFT (HEARTMATE II) N/A  2017    Procedure: INSERT VENTRICULAR ASSIST DEVICE LEFT (HEARTMATE II);  Median Sternotomy Heartmate II Left Ventricular Assist Device Insertion on Pump Oxygenator;  Surgeon: Ronnie Quigley MD;  Location: [facility] OR    IR CHEST TUBE PLACEMENT NON-TUNNELED RIGHT  2021    ORTHOPEDIC SURGERY  1994    right knee wired    PICC DOUBLE LUMEN PLACEMENT Right 2020    5FR PICC DL. Length-43cm (1cm out).    PICC INSERTION - DOUBLE LUMEN Right 2021    IK/BRACH    RELEASE CARPAL TUNNEL BILATERAL Bilateral 2021    Procedure: Bilateral carpal tunnel release;  Surgeon: Jermaine Brand MD;  Location: [facility] OR    TRANSPLANT HEART RECIPIENT N/A 2021    Procedure: Redo median sternotomy, lysis of adhesions, heart transplant recipient, on cardiopulmonary bypass, intraoperative transesophageal echocardiogram per anesthesia, Implantable Cardioverter Defibrillator (ICD) removal;  Surgeon: Ronnie Quigley MD;  Location: [facility] OR     Family History   Problem Relation Age of Onset    Cerebrovascular Disease Mother 64    Diabetes Mother     Hypertension Mother     Coronary Artery Disease Father     Diabetes Type 2  Father     Obesity Brother     Obesity Brother     Cerebrovascular Disease Daughter 40     Social History     Socioeconomic History    Marital status:      Spouse name: Not on file    Number of children: Not on file    Years of education: Not on file    Highest education level: Not on file   Occupational History    Not on file   Tobacco Use    Smoking status: Former Smoker     Quit date:      Years since quittin.6    Smokeless tobacco: Never Used   Substance and Sexual Activity    Alcohol use: No    Drug use: No    Sexual activity: Yes     Partners: Female   Other Topics Concern    Parent/sibling w/ CABG, MI or angioplasty before 65F 55M? No   Social History Narrative    Not on file     Social Determinants of Health     Financial Resource Strain:     Difficulty of Paying Living Expenses:    Food  Insecurity:     Worried About Running Out of Food in the Last Year:     Ran Out of Food in the Last Year:    Transportation Needs:     Lack of Transportation (Medical):     Lack of Transportation (Non-Medical):    Physical Activity:     Days of Exercise per Week:     Minutes of Exercise per Session:    Stress:     Feeling of Stress :    Social Connections:     Frequency of Communication with Friends and Family:     Frequency of Social Gatherings with Friends and Family:     Attends Taoism Services:     Active Member of Clubs or Organizations:     Attends Club or Organization Meetings:     Marital Status:    Intimate Partner Violence:     Fear of Current or Ex-Partner:     Emotionally Abused:     Physically Abused:     Sexually Abused:      Patient Active Problem List   Diagnosis    Paroxysmal atrial fibrillation (H)    Type 2 diabetes mellitus with complication, with long-term current use of insulin (H)    Stage 3b chronic kidney disease    H/O gastric bypass    CECILIA (obstructive sleep apnea)    Vitamin B12 deficiency (non anemic)    Paroxysmal VT (H)    NICM (nonischemic cardiomyopathy) (H)/ EF 20%    Long-term (current) use of anticoagulants [Z79.01]    Physical deconditioning    Chronic systolic congestive heart failure (H)    Iron deficiency anemia    Shortness of breath    WALTER (acute kidney injury) (H)    Class 2 severe obesity due to excess calories with serious comorbidity and body mass index (BMI) of 35.0 to 35.9 in adult (H)    Bilateral carpal tunnel syndrome    Rotator cuff tear arthropathy of both shoulders    COPD (chronic obstructive pulmonary disease) (H)    Major depression, recurrent, chronic (H)    Gouty arthropathy, chronic, without tophi    Right carpal tunnel syndrome    Need for prophylactic antibiotic    S/P orthotopic heart transplant (H)    Heart replaced by transplant (H)    Generalized muscle weakness    Fall, initial encounter    Pericardial effusion    Sinus tachycardia    Status post  "heart transplantation (H)    Fever in adult     Allergies   Allergen Reactions    Grass Shortness Of Breath    Ace Inhibitors Cough    Dust Mites Other (See Comments)     Asthma    Mold Other (See Comments)     Asthma    Penicillins Other (See Comments)     Unknown - childhood exposure    Tolerated Zosyn 9/18-9/20/2020    Sulfa Drugs Other (See Comments) and Unknown     Unknown childhood reaction         ROS     A 10 point ROS was performed with pertinent findings listed above.      Objective     PHYSICAL EXAM  BP (!) 135/96 (BP Location: Left arm)   Pulse 92   Temp 97.5  F (36.4  C) (Axillary)   Resp 23   Ht 1.727 m (5' 8\")   Wt 88.7 kg (195 lb 8.8 oz)   SpO2 99%   BMI 29.73 kg/m    Wt Readings from Last 4 Encounters:   08/31/21 88.7 kg (195 lb 8.8 oz)   08/05/21 93.6 kg (206 lb 4.8 oz)   07/18/21 89.4 kg (197 lb 3.2 oz)   07/05/21 88.7 kg (195 lb 9.6 oz)     Constitutional: non-toxic appearing male in NAD, sitting up in bed  Eyes: nl EOM, conjunctiva, sclera  ENT: nl external ears, nose, hearing, lips, teeth, gums, throat  No mucous membrane lesions, normal saliva pool  Resp: lungs clear to auscultation, non-labored respirations on room air  CV: RRR, no murmurs, rubs or gallops, no edema  GI: no ABD mass or tenderness, no HSM  : not tested  Lymph: no cervical lymphadenopathynodes  MS: Prominent swelling and warmth overlying L 2nd and 3rd MCP joints, mild L wrist effusion. No redness. Tender to palpation in 2nd and 3rd MCP joint, more mild tenderness to palpation of L 1st and 4th MCP, 2nd and 3rd PIPs, and L wrist. Limited AROM in L 1st-3rd digits and L wrist. Cannot make fist with L hand, can make full fist with R hand. The rest of joints including R hand/wrist, bilateral elbows, shoulders, knees, ankles, and toes were non-tender to palpation and without evidence of active synovitis.  Skin: no nail pitting, alopecia, rash, nodules or lesions  Neuro: AAOx3, CN II - XII grossly intact, HEAD  Psych: nl " judgement, orientation, memory, affect.    DATA:  Outside Records: Not Applicable  Outside Xrays: Not Applicable  CBC:  Recent Labs   Lab Test 09/01/21  0637 08/31/21  0958 08/31/21 0624 08/30/21 0627   WBC 5.0  --  4.9 3.8*   RBC 2.91*  --  3.05* 2.79*   HGB 7.8* 8.5* 8.3* 7.4*   HCT 26.6*  --  28.0* 25.2*   MCV 91  --  92 90   MCH 26.8  --  27.2 26.5   MCHC 29.3*  --  29.6* 29.4*   RDW 18.0*  --  17.5* 17.2*     --  275 234       BMP:  Recent Labs   Lab Test 09/01/21  0805 09/01/21  0637 09/01/21  0238 08/31/21 0624 08/30/21 0627   NA  --  138  --  138 136   POTASSIUM  --  3.4  --  4.0 3.3*   CHLORIDE  --  107  --  108 108   CO2  --  25  --  24 24   ANIONGAP  --  6  --  6 4   * 107* 151* 116* 131*   BUN  --  9  --  10 12   CR  --  1.54*  --  1.65* 1.57*   GFRESTIMATED  --  47*  --  43* 46*   QAMAR  --  7.6*  --  7.8* 7.8*       LFT:  Recent Labs   Lab Test 08/28/21  0633 08/24/21  0613 08/05/21  0931   PROTTOTAL 5.4* 6.2* 6.4*   ALBUMIN 1.9* 2.6* 2.9*   BILITOTAL 0.3 0.6 0.5   ALKPHOS 77 122 281*   AST 11 10 12   ALT 10 17 36       No results found for: CKTOTAL  TSH   Date Value Ref Range Status   07/09/2021 3.16 0.40 - 4.00 mU/L Final     Lab Results   Component Value Date    URIC 5.3 08/24/2021    URIC 4.8 03/12/2021    URIC 3.9 02/01/2021    URIC 5.0 01/09/2021       Inflammatory markers  Lab Results   Component Value Date    .0 08/27/2021    CRP 13.0 07/19/2021    CRP 7.7 07/18/2021    CRP 98.0 07/09/2021    CRP 87.0 07/08/2021    CRP 27.0 06/28/2021     Lab Results   Component Value Date    SED 72 07/08/2021    SED 10 10/27/2020    SED 8 10/20/2020     Ferritin   Date Value Ref Range Status   08/05/2021 98 26 - 388 ng/mL Final   05/13/2021 98 26 - 388 ng/mL Final   02/09/2021 33 26 - 388 ng/mL Final   07/31/2019 41 26 - 388 ng/mL Final       UA RESULTS:  Recent Labs   Lab Test 08/24/21  0726 07/09/21  0640 06/30/21  0640   COLOR Light Yellow Yellow Yellow   APPEARANCE Clear Slightly  Cloudy Slightly Cloudy   URINEGLC Negative Negative Negative   URINEBILI Negative Negative Negative   URINEKETONE Negative Negative Trace*   SG 1.016 1.020 1.022   UBLD Negative Negative Negative   URINEPH 7.0 5.5 5.0   PROTEIN 50 * 50* 50*   NITRITE Negative Negative Negative   LEUKEST Negative Negative Negative   RBCU 1 1 1   WBCU 2 3 4         Autoimmunity labs:     No results found for: RHF  No results found for: CCPIGG  No results found for: ANCA  No results found for: E3SIWXE  No results found for: T8UCGOP  No results found for: BRITTANY  No results found for: DNA  No results found for: RNPIGG, SMIGG, SSAIGG, SSBIGG, SCLIGG    IMAGING:    CT Chest 8/24/21:  1. New peripheral consolidative opacity in the left upper lobe which  appears partially cavitating. There are also multiple new pulmonary  nodules measuring up to 5 mm. Differential includes pneumonia,  including atypical pneumonias with possible developing abscess  formation, organizing pneumonia, and septic pulmonary emboli. Previous  right lower lobe cavitary lesion appears resolved with residual  scarring.  2. Continued loculated right pleural effusion and anterior mediastinal  simple fluid collection. Pneumothorax component has resolved.  3. Stable bilateral lower lobe bronchiectasis and scarring.    L hand x-ray (7/8/21):  1.  No fracture or joint malalignment.  2.  Chondrocalcinosis and capsular calcification at the second and  third MCP joints.  3.  Small ossicle adjacent to the thumb CMC joint.

## 2021-09-02 ENCOUNTER — APPOINTMENT (OUTPATIENT)
Dept: OCCUPATIONAL THERAPY | Facility: CLINIC | Age: 66
DRG: 177 | End: 2021-09-02
Payer: COMMERCIAL

## 2021-09-02 VITALS
WEIGHT: 186.29 LBS | OXYGEN SATURATION: 96 % | HEART RATE: 109 BPM | BODY MASS INDEX: 28.23 KG/M2 | TEMPERATURE: 98.2 F | HEIGHT: 68 IN | RESPIRATION RATE: 18 BRPM | SYSTOLIC BLOOD PRESSURE: 134 MMHG | DIASTOLIC BLOOD PRESSURE: 95 MMHG

## 2021-09-02 DIAGNOSIS — U07.1 CLINICAL DIAGNOSIS OF COVID-19: Primary | ICD-10-CM

## 2021-09-02 LAB
ACANTHOCYTES BLD QL SMEAR: SLIGHT
ANION GAP SERPL CALCULATED.3IONS-SCNC: 5 MMOL/L (ref 3–14)
BACTERIA BRONCH: ABNORMAL
BACTERIA BRONCH: ABNORMAL
BASOPHILS # BLD MANUAL: 0.2 10E3/UL (ref 0–0.2)
BASOPHILS NFR BLD MANUAL: 3 %
BUN SERPL-MCNC: 9 MG/DL (ref 7–30)
CALCIUM SERPL-MCNC: 7.9 MG/DL (ref 8.5–10.1)
CHLORIDE BLD-SCNC: 106 MMOL/L (ref 94–109)
CO2 SERPL-SCNC: 26 MMOL/L (ref 20–32)
CREAT SERPL-MCNC: 1.58 MG/DL (ref 0.66–1.25)
EOSINOPHIL # BLD MANUAL: 0.2 10E3/UL (ref 0–0.7)
EOSINOPHIL NFR BLD MANUAL: 3 %
ERYTHROCYTE [DISTWIDTH] IN BLOOD BY AUTOMATED COUNT: 18.3 % (ref 10–15)
GFR SERPL CREATININE-BSD FRML MDRD: 46 ML/MIN/1.73M2
GLUCOSE BLD-MCNC: 107 MG/DL (ref 70–99)
GLUCOSE BLDC GLUCOMTR-MCNC: 144 MG/DL (ref 70–99)
GLUCOSE BLDC GLUCOMTR-MCNC: 147 MG/DL (ref 70–99)
GLUCOSE BLDC GLUCOMTR-MCNC: 157 MG/DL (ref 70–99)
GLUCOSE BLDC GLUCOMTR-MCNC: 99 MG/DL (ref 70–99)
HCT VFR BLD AUTO: 26.8 % (ref 40–53)
HGB BLD-MCNC: 8 G/DL (ref 13.3–17.7)
LYMPHOCYTES # BLD MANUAL: 1.3 10E3/UL (ref 0.8–5.3)
LYMPHOCYTES NFR BLD MANUAL: 20 %
MAGNESIUM SERPL-MCNC: 1.6 MG/DL (ref 1.6–2.3)
MCH RBC QN AUTO: 26.9 PG (ref 26.5–33)
MCHC RBC AUTO-ENTMCNC: 29.9 G/DL (ref 31.5–36.5)
MCV RBC AUTO: 90 FL (ref 78–100)
METAMYELOCYTES # BLD MANUAL: 0.1 10E3/UL
METAMYELOCYTES NFR BLD MANUAL: 2 %
MONOCYTES # BLD MANUAL: 1.1 10E3/UL (ref 0–1.3)
MONOCYTES NFR BLD MANUAL: 17 %
MYELOCYTES # BLD MANUAL: 0.2 10E3/UL
MYELOCYTES NFR BLD MANUAL: 3 %
NEUTROPHILS # BLD MANUAL: 3.4 10E3/UL (ref 1.6–8.3)
NEUTROPHILS NFR BLD MANUAL: 52 %
P JIROVECII DNA SPEC QL NAA+PROBE: NOT DETECTED
PLAT MORPH BLD: ABNORMAL
PLATELET # BLD AUTO: 283 10E3/UL (ref 150–450)
POTASSIUM BLD-SCNC: 3.4 MMOL/L (ref 3.4–5.3)
RBC # BLD AUTO: 2.97 10E6/UL (ref 4.4–5.9)
RBC MORPH BLD: ABNORMAL
SODIUM SERPL-SCNC: 137 MMOL/L (ref 133–144)
TACROLIMUS BLD-MCNC: 6.3 UG/L (ref 5–15)
TME LAST DOSE: NORMAL H
TME LAST DOSE: NORMAL H
TOXIC GRANULES BLD QL SMEAR: PRESENT
WBC # BLD AUTO: 6.5 10E3/UL (ref 4–11)

## 2021-09-02 PROCEDURE — 94660 CPAP INITIATION&MGMT: CPT

## 2021-09-02 PROCEDURE — 250N000009 HC RX 250: Performed by: STUDENT IN AN ORGANIZED HEALTH CARE EDUCATION/TRAINING PROGRAM

## 2021-09-02 PROCEDURE — 250N000013 HC RX MED GY IP 250 OP 250 PS 637: Performed by: INTERNAL MEDICINE

## 2021-09-02 PROCEDURE — 85027 COMPLETE CBC AUTOMATED: CPT | Performed by: NURSE PRACTITIONER

## 2021-09-02 PROCEDURE — 250N000012 HC RX MED GY IP 250 OP 636 PS 637: Performed by: STUDENT IN AN ORGANIZED HEALTH CARE EDUCATION/TRAINING PROGRAM

## 2021-09-02 PROCEDURE — 80048 BASIC METABOLIC PNL TOTAL CA: CPT | Performed by: NURSE PRACTITIONER

## 2021-09-02 PROCEDURE — 83735 ASSAY OF MAGNESIUM: CPT | Performed by: NURSE PRACTITIONER

## 2021-09-02 PROCEDURE — 97535 SELF CARE MNGMENT TRAINING: CPT | Mod: GO

## 2021-09-02 PROCEDURE — 36415 COLL VENOUS BLD VENIPUNCTURE: CPT | Performed by: NURSE PRACTITIONER

## 2021-09-02 PROCEDURE — 99238 HOSP IP/OBS DSCHRG MGMT 30/<: CPT | Mod: GC | Performed by: INTERNAL MEDICINE

## 2021-09-02 PROCEDURE — 250N000013 HC RX MED GY IP 250 OP 250 PS 637: Performed by: NURSE PRACTITIONER

## 2021-09-02 PROCEDURE — 250N000013 HC RX MED GY IP 250 OP 250 PS 637: Performed by: STUDENT IN AN ORGANIZED HEALTH CARE EDUCATION/TRAINING PROGRAM

## 2021-09-02 PROCEDURE — 250N000013 HC RX MED GY IP 250 OP 250 PS 637

## 2021-09-02 PROCEDURE — 250N000012 HC RX MED GY IP 250 OP 636 PS 637: Performed by: NURSE PRACTITIONER

## 2021-09-02 PROCEDURE — 80197 ASSAY OF TACROLIMUS: CPT

## 2021-09-02 RX ORDER — PREDNISONE 5 MG/1
5 TABLET ORAL DAILY
Qty: 30 TABLET | Refills: 0 | Status: SHIPPED | OUTPATIENT
Start: 2021-09-02 | End: 2021-09-23

## 2021-09-02 RX ORDER — TACROLIMUS 1 MG/1
CAPSULE ORAL
Qty: 900 CAPSULE | Refills: 11
Start: 2021-09-02 | End: 2021-09-10

## 2021-09-02 RX ORDER — MYCOPHENOLATE MOFETIL 250 MG/1
500 CAPSULE ORAL 2 TIMES DAILY
Qty: 120 CAPSULE | Refills: 0 | Status: SHIPPED | OUTPATIENT
Start: 2021-09-02 | End: 2021-09-07

## 2021-09-02 RX ORDER — COLCHICINE 0.6 MG/1
0.6 TABLET ORAL 2 TIMES DAILY
Qty: 60 TABLET | Refills: 0 | Status: SHIPPED | OUTPATIENT
Start: 2021-09-02 | End: 2021-09-10 | Stop reason: ALTCHOICE

## 2021-09-02 RX ADMIN — FLUTICASONE FUROATE AND VILANTEROL TRIFENATATE 1 PUFF: 100; 25 POWDER RESPIRATORY (INHALATION) at 08:07

## 2021-09-02 RX ADMIN — TACROLIMUS 4 MG: 1 CAPSULE ORAL at 08:03

## 2021-09-02 RX ADMIN — MULTIPLE VITAMINS W/ MINERALS TAB 1 TABLET: TAB at 08:05

## 2021-09-02 RX ADMIN — UMECLIDINIUM 1 PUFF: 62.5 AEROSOL, POWDER ORAL at 08:07

## 2021-09-02 RX ADMIN — BUPROPION HYDROCHLORIDE 75 MG: 75 TABLET, FILM COATED ORAL at 08:04

## 2021-09-02 RX ADMIN — Medication 1 MG: at 04:56

## 2021-09-02 RX ADMIN — MYCOPHENOLATE MOFETIL 500 MG: 250 CAPSULE ORAL at 08:04

## 2021-09-02 RX ADMIN — GABAPENTIN 300 MG: 300 CAPSULE ORAL at 13:33

## 2021-09-02 RX ADMIN — ANAKINRA 100 MG: 100 INJECTION, SOLUTION SUBCUTANEOUS at 16:24

## 2021-09-02 RX ADMIN — INSULIN ASPART 1 UNITS: 100 INJECTION, SOLUTION INTRAVENOUS; SUBCUTANEOUS at 13:32

## 2021-09-02 RX ADMIN — AMOXICILLIN AND CLAVULANATE POTASSIUM 1 TABLET: 875; 125 TABLET, FILM COATED ORAL at 08:05

## 2021-09-02 RX ADMIN — PREDNISONE 5 MG: 5 TABLET ORAL at 08:04

## 2021-09-02 RX ADMIN — PANTOPRAZOLE SODIUM 40 MG: 40 TABLET, DELAYED RELEASE ORAL at 08:04

## 2021-09-02 RX ADMIN — ALLOPURINOL 300 MG: 300 TABLET ORAL at 08:05

## 2021-09-02 RX ADMIN — CALCIUM CARBONATE 600 MG (1,500 MG)-VITAMIN D3 400 UNIT TABLET 1 TABLET: at 08:05

## 2021-09-02 RX ADMIN — ASPIRIN 81 MG: 81 TABLET, COATED ORAL at 08:05

## 2021-09-02 RX ADMIN — GABAPENTIN 300 MG: 300 CAPSULE ORAL at 08:05

## 2021-09-02 ASSESSMENT — ACTIVITIES OF DAILY LIVING (ADL)
ADLS_ACUITY_SCORE: 13

## 2021-09-02 ASSESSMENT — MIFFLIN-ST. JEOR: SCORE: 1609.5

## 2021-09-02 NOTE — PLAN OF CARE
"/72 (BP Location: Right arm, Cuff Size: Adult Regular)   Pulse 96   Temp 98.1  F (36.7  C) (Oral)   Resp 17   Ht 1.727 m (5' 8\")   Wt 88.7 kg (195 lb 8.8 oz)   SpO2 98%   BMI 29.73 kg/m      Neuro: A&Ox4. Cooperative and pleasant. Able to make needs known, calls appropiately  Cardiac: SR-ST. HR: 90's-110's, BP's 100's-110's/70's-80's. afebrile  VSS.   Respiratory: Sating>95% on RA when awake and 30% BiPAP at night   GI/: Adequate urine output via commode- large void unmeasured d/t mixed with stool overnight. BM X1- loose  Diet/appetite: Tolerating Regualr diet. Poor appetite, needs encouragement  Activity:  Ind- SBA in room, needs set up for commode, up in chair, ambulates with walker   Pain: Pain becoming more well controlled overnight, pt primary complaint is of gout flair up pain in L hand/knuckles. Kineret added. Dilaudid oral given x2 overnight  Skin: nuckles of left hand swollen and red, hurts to move, otherwise no new deficits noted.  LDA's:  R-PIV: SL    Plan: Possible discharge today, pending second dose of Kineret. Continue with POC. Notify primary team with changes.     Maricarmen Parson RN  [facility] Intermediate Care Unit    "

## 2021-09-03 ENCOUNTER — TELEPHONE (OUTPATIENT)
Dept: TRANSPLANT | Facility: CLINIC | Age: 66
End: 2021-09-03

## 2021-09-03 ENCOUNTER — TELEPHONE (OUTPATIENT)
Dept: RHEUMATOLOGY | Facility: CLINIC | Age: 66
End: 2021-09-03

## 2021-09-03 ENCOUNTER — TELEPHONE (OUTPATIENT)
Dept: CARDIOLOGY | Facility: CLINIC | Age: 66
End: 2021-09-03

## 2021-09-03 NOTE — PLAN OF CARE
DISCHARGE                         9/2/2021  6:58 PM  ----------------------------------------------------------------------------  Discharged to: Home  Via: private transportation  Accompanied by: Family / sister  Discharge Instructions: diet, activity, medications, follow up appointments, when to call the MD, aftercare instructions.  Prescriptions: To be filled by [facility] outpatient   pharmacy; medication list reviewed & sent with pt  Follow Up Appointments: arranged; information given  Belongings: All sent with pt  IV: d/c'd  Telemetry: d/c'd  Pt exhibits understanding of above discharge instructions; all questions answered.    Discharge Paperwork: Signed, copied, and sent home with patient.

## 2021-09-03 NOTE — TELEPHONE ENCOUNTER
After discussing with ID, the following communicated to patient:     Pt to be quarantined for 20 days from first positive covid test. 9/12.     Home health to collect a cdiff test tomorrow on 9/4 for c/o diarrhea.     Home health will see patient on 9/4.     PT/OT orders in     Rheum and ID follow up appointments made.     CT chest and possible pentamidine orders still need to be made.

## 2021-09-03 NOTE — LETTER
September 3, 2021    Home Health  Fax: [phone]    Patient Name: Jim Willingham   :  1955   MRN: [id number]  ICD10:  z94.1 , z79.899    Subject: Request for Labs    Jim Willingham is a heart transplant patient currently being followed by the [facility].  We would like to request your assistance in obtaining the following laboratory tests which are part of our routine surveillance program for heart transplant recipients.  (Items needed are checked.)    Please fax the lab results as soon as they are available to:   Thoracic Transplant Department  Fax Number:  [phone]     Item Frequency   x CBC with platelets  and Weekly with home health visits. Qty: 20 Expires 2022.    x Basic metabolic panel (including:  BUN,   Serum Creatinine, Sodium, Potassium, Chloride,   CO2, Calcium, Magnesium, Phosphorus, and   Glucose)  and Weekly with home health visits. Qty: 20 Expires 2022.    x  tacrolimus/Prograf level (12 hour trough)  (Should be mailed to the [facility] in the  provided to you by the patient.) Weekly with home health visits. Qty: 20 Expires 2022.    x Stool specimen for cdiff Once on 2021   x PT/OT home evaluation 2021   x Home health RN visit 2/weekly 2021     Thank you  for your continued support and the opportunity to collaborate in the care of this patient.  If you have any questions, please call the Thoracic Transplant Team at [phone] or [phone].      Delisa Montgomery M.D.  Division of Cardiology   of Medicine

## 2021-09-03 NOTE — TELEPHONE ENCOUNTER
Spoke with the patient and confirmed ID and rhemuetalogy appointments on 9/30/21and  10/7/21 .  Informed patient an itinerary can be accessed on [employer].

## 2021-09-03 NOTE — TELEPHONE ENCOUNTER
Message sent to Dr. Montgomery to review follow up appt. Pt is already scheduled for 9/21. Pt was recently positive for covid on 8/24. Rheum and ID follow up appointments also needed. Call out to home health RN to confirm when she is seeing patient next (pt due for tac level on Monday 9/6).

## 2021-09-03 NOTE — TELEPHONE ENCOUNTER
[facility] Call Center    Phone Message    May a detailed message be left on voicemail: yes     Reason for Call: Other: Patient has hospital discharge instructions to Follow up with Cardiology [facility] in 1 week, with Tacrolimus level on Monday, 9/6 - next available is 09/16/21 at this time. Please follow up with pt     Action Taken: Message routed to:  [facility] Heart    Travel Screening: Not Applicable

## 2021-09-03 NOTE — LETTER
September 3, 2021    Home Health  Fax: [phone]    Patient Name: Jim Willingham   :  1955   MRN: [id number]  ICD10:  z94.1 , z79.899    Subject: Request for Labs    Jim Willingham is a heart transplant patient currently being followed by the [facility].  We would like to request your assistance in obtaining the following laboratory tests which are part of our routine surveillance program for heart transplant recipients.  (Items needed are checked.)    Please fax the lab results as soon as they are available to:   Thoracic Transplant Department  Fax Number:  [phone]     Item Frequency   x CBC with platelets  and Weekly with home health visits. Qty: 20 Expires 2022.    x Basic metabolic panel (including:  BUN,   Serum Creatinine, Sodium, Potassium, Chloride,   CO2, Calcium, Magnesium, Phosphorus, and   Glucose)  and Weekly with home health visits. Qty: 20 Expires 2022.    x  tacrolimus/Prograf level (12 hour trough)  (Should be mailed to the [facility] in the  provided to you by the patient.) Weekly with home health visits. Qty: 20 Expires 2022.    x Stool specimen for cdiff Once on 2021   x PT/OT home evaluation 2021   x Home health RN visit 2/weekly 2021     Thank you  for your continued support and the opportunity to collaborate in the care of this patient.  If you have any questions, please call the Thoracic Transplant Team at [phone] or [phone].      Delisa Montgomery M.D.  Division of Cardiology   of Medicine

## 2021-09-03 NOTE — TELEPHONE ENCOUNTER
Pt called to discuss discharge follow up. Pt states he is still experiencing body aches and diarrhea but otherwise feels fine. Medication reviewed. Pt questioned whether he still needs to be on nystatin/troches. No mouth sores at this time. Troches were dc'd as inpateint. Rheum and ID follow up appointments made. Call out to home health to confirm follow up. Message sent to ID to discuss covid protocols, diarrhea, and need for pentamidine neb. Pt also inquiring about home PT/OT. Orders in.

## 2021-09-03 NOTE — PLAN OF CARE
Occupational Therapy Discharge Summary    Reason for therapy discharge:    Discharged to home with home therapy.    Progress towards therapy goal(s). See goals on Care Plan in [facility] electronic health record for goal details.  Goals partially met.  Barriers to achieving goals:   discharge from facility.    Therapy recommendation(s):    Continued therapy is recommended.  Rationale/Recommendations:  Pt will benefit from continued HH OT to maximize functional independence, strength and endurance.

## 2021-09-04 ENCOUNTER — MEDICAL CORRESPONDENCE (OUTPATIENT)
Dept: HEALTH INFORMATION MANAGEMENT | Facility: CLINIC | Age: 66
End: 2021-09-04

## 2021-09-04 ENCOUNTER — LAB REQUISITION (OUTPATIENT)
Dept: LAB | Facility: CLINIC | Age: 66
End: 2021-09-04
Payer: COMMERCIAL

## 2021-09-04 DIAGNOSIS — Z79.899 OTHER LONG TERM (CURRENT) DRUG THERAPY: ICD-10-CM

## 2021-09-04 DIAGNOSIS — Z94.1 HEART TRANSPLANT STATUS (H): ICD-10-CM

## 2021-09-04 LAB
ANION GAP SERPL CALCULATED.3IONS-SCNC: 6 MMOL/L (ref 3–14)
BUN SERPL-MCNC: 10 MG/DL (ref 7–30)
CALCIUM SERPL-MCNC: 8.4 MG/DL (ref 8.5–10.1)
CHLORIDE BLD-SCNC: 107 MMOL/L (ref 94–109)
CO2 SERPL-SCNC: 25 MMOL/L (ref 20–32)
CREAT SERPL-MCNC: 1.84 MG/DL (ref 0.66–1.25)
ERYTHROCYTE [DISTWIDTH] IN BLOOD BY AUTOMATED COUNT: 18.5 % (ref 10–15)
GFR SERPL CREATININE-BSD FRML MDRD: 38 ML/MIN/1.73M2
GLUCOSE BLD-MCNC: 95 MG/DL (ref 70–99)
HCT VFR BLD AUTO: 31.9 % (ref 40–53)
HGB BLD-MCNC: 9.4 G/DL (ref 13.3–17.7)
MCH RBC QN AUTO: 26.9 PG (ref 26.5–33)
MCHC RBC AUTO-ENTMCNC: 29.5 G/DL (ref 31.5–36.5)
MCV RBC AUTO: 91 FL (ref 78–100)
PLATELET # BLD AUTO: 368 10E3/UL (ref 150–450)
POTASSIUM BLD-SCNC: 3.3 MMOL/L (ref 3.4–5.3)
RBC # BLD AUTO: 3.5 10E6/UL (ref 4.4–5.9)
SODIUM SERPL-SCNC: 138 MMOL/L (ref 133–144)
WBC # BLD AUTO: 5.9 10E3/UL (ref 4–11)

## 2021-09-04 PROCEDURE — 85027 COMPLETE CBC AUTOMATED: CPT | Performed by: INTERNAL MEDICINE

## 2021-09-04 PROCEDURE — 80048 BASIC METABOLIC PNL TOTAL CA: CPT | Performed by: INTERNAL MEDICINE

## 2021-09-06 LAB — ACID FAST STAIN (ARUP): NORMAL

## 2021-09-07 ENCOUNTER — TEAM CONFERENCE (OUTPATIENT)
Dept: TRANSPLANT | Facility: CLINIC | Age: 66
End: 2021-09-07

## 2021-09-07 DIAGNOSIS — Z94.1 S/P ORTHOTOPIC HEART TRANSPLANT (H): ICD-10-CM

## 2021-09-07 LAB — MISCELLANEOUS TEST 1 (ARUP): NORMAL

## 2021-09-07 RX ORDER — MYCOPHENOLATE MOFETIL 250 MG/1
500 CAPSULE ORAL 2 TIMES DAILY
Qty: 120 CAPSULE | Refills: 3 | Status: SHIPPED | OUTPATIENT
Start: 2021-09-07 | End: 2021-10-25

## 2021-09-07 NOTE — TELEPHONE ENCOUNTER
Home health confirmed they will see patient on 9/9 and do cdiff and tacrolimus level. Then back to 2x/week. Pt updated.     Potassium 3.3. Pt encouraged to increase oral intake of potassium. We will see what repeat lab is on 9/9.     Pt has insurance questions. Message left for Kadie.

## 2021-09-07 NOTE — PROGRESS NOTES
Call placed to [facility] at [phone]. Pt wasn't seen on Monday 9/6 and cdiff wasn't collected. Tacro was also suppose to be drawn on Monday 9/6.

## 2021-09-08 ENCOUNTER — TELEPHONE (OUTPATIENT)
Dept: CARE COORDINATION | Facility: CLINIC | Age: 66
End: 2021-09-08

## 2021-09-08 ENCOUNTER — TELEPHONE (OUTPATIENT)
Dept: TRANSPLANT | Facility: CLINIC | Age: 66
End: 2021-09-08

## 2021-09-08 LAB — BRONCHOSCOPY: NORMAL

## 2021-09-08 NOTE — TELEPHONE ENCOUNTER
Home care PT called for PT orders 2x a week for 3 weeks starting next week for Mobility and strength and a verbal can be left on the VM.

## 2021-09-08 NOTE — PROGRESS NOTES
Transplant Social Work Services Phone Call      Data: Pt is s/p heart transplant 5/6/2021. Pt called and informed writer that he will be losing his insurance in a few weeks. Unfortunately his wife lost her job and she carried the insurance. Pt has a few options he is working on currently and does not require writer or financial counselors immediate help. Pt reports he is receiving a new shipment of medications this week so will be fine with medications for the next month. We discussed patient assistance programs and he will let writer know.     Intervention: Supportive, Community Resources   Assessment: Pt working to resolve insurance issues. Pt understands the importance maintaining insurance   Education provided by SW: Ongoing Social Work support   Plan:    Pt is following up on several options for insurance. Currently does not require assistance from writer but he will keep writer updated on situation.

## 2021-09-09 ENCOUNTER — LAB REQUISITION (OUTPATIENT)
Dept: LAB | Facility: CLINIC | Age: 66
End: 2021-09-09
Payer: COMMERCIAL

## 2021-09-09 ENCOUNTER — TELEPHONE (OUTPATIENT)
Dept: INFECTIOUS DISEASES | Facility: CLINIC | Age: 66
End: 2021-09-09

## 2021-09-09 ENCOUNTER — TELEPHONE (OUTPATIENT)
Dept: TRANSPLANT | Facility: CLINIC | Age: 66
End: 2021-09-09

## 2021-09-09 DIAGNOSIS — Z48.21 ENCOUNTER FOR AFTERCARE FOLLOWING HEART TRANSPLANT (H): ICD-10-CM

## 2021-09-09 DIAGNOSIS — Z79.899 OTHER LONG TERM (CURRENT) DRUG THERAPY: ICD-10-CM

## 2021-09-09 DIAGNOSIS — B44.1 PULMONARY ASPERGILLOSIS (H): Primary | ICD-10-CM

## 2021-09-09 DIAGNOSIS — Z94.1 S/P ORTHOTOPIC HEART TRANSPLANT (H): ICD-10-CM

## 2021-09-09 LAB
ANION GAP SERPL CALCULATED.3IONS-SCNC: 4 MMOL/L (ref 3–14)
BUN SERPL-MCNC: 15 MG/DL (ref 7–30)
CALCIUM SERPL-MCNC: 8.3 MG/DL (ref 8.5–10.1)
CHLORIDE BLD-SCNC: 112 MMOL/L (ref 94–109)
CO2 SERPL-SCNC: 24 MMOL/L (ref 20–32)
CREAT SERPL-MCNC: 1.53 MG/DL (ref 0.66–1.25)
ERYTHROCYTE [DISTWIDTH] IN BLOOD BY AUTOMATED COUNT: 19.8 % (ref 10–15)
GFR SERPL CREATININE-BSD FRML MDRD: 47 ML/MIN/1.73M2
GLUCOSE BLD-MCNC: 77 MG/DL (ref 70–99)
HCT VFR BLD AUTO: 29.9 % (ref 40–53)
HGB BLD-MCNC: 9 G/DL (ref 13.3–17.7)
MCH RBC QN AUTO: 27.8 PG (ref 26.5–33)
MCHC RBC AUTO-ENTMCNC: 30.1 G/DL (ref 31.5–36.5)
MCV RBC AUTO: 92 FL (ref 78–100)
PLATELET # BLD AUTO: 350 10E3/UL (ref 150–450)
POTASSIUM BLD-SCNC: 3.7 MMOL/L (ref 3.4–5.3)
RBC # BLD AUTO: 3.24 10E6/UL (ref 4.4–5.9)
SODIUM SERPL-SCNC: 140 MMOL/L (ref 133–144)
TACROLIMUS BLD-MCNC: 5.5 UG/L (ref 5–15)
TME LAST DOSE: NORMAL H
TME LAST DOSE: NORMAL H
WBC # BLD AUTO: 9.7 10E3/UL (ref 4–11)

## 2021-09-09 PROCEDURE — 80048 BASIC METABOLIC PNL TOTAL CA: CPT | Performed by: INTERNAL MEDICINE

## 2021-09-09 PROCEDURE — 85027 COMPLETE CBC AUTOMATED: CPT | Performed by: INTERNAL MEDICINE

## 2021-09-09 PROCEDURE — 80197 ASSAY OF TACROLIMUS: CPT | Performed by: INTERNAL MEDICINE

## 2021-09-09 RX ORDER — VORICONAZOLE 200 MG/1
300 TABLET, FILM COATED ORAL 2 TIMES DAILY
Qty: 90 TABLET | Refills: 1 | Status: SHIPPED | OUTPATIENT
Start: 2021-09-09 | End: 2021-10-22

## 2021-09-09 RX ORDER — BUPROPION HYDROCHLORIDE 75 MG/1
75 TABLET ORAL 2 TIMES DAILY
Qty: 60 TABLET | Refills: 0 | Status: SHIPPED | OUTPATIENT
Start: 2021-09-09 | End: 2021-09-28

## 2021-09-09 NOTE — TELEPHONE ENCOUNTER
Called patient to inform him of chest CT added to 9/21 appointments.    Patient states today that home health nurse ricardo labs but he was unable to provide stool sample for c.diff test. Patient has sample cup and home health nurse able to  this coming Monday.    Patient also requesting refills for amitriptyline and bupropion today. Requested that patient call PCP office to scheduled PCP follow-up appointment today. Short term refills provided.    Patient with questions about gout medication. Pt received anakinra. Per discharge paperwork patient should take his 3rd dose of anakinra and stop taking colchicine. Advised patient to follow-up with Rheumatology for further questions.     Patient verbalized understanding and next steps.

## 2021-09-09 NOTE — TELEPHONE ENCOUNTER
[facility] Call Center    Phone Message    May a detailed message be left on voicemail: yes     Reason for Call: Medication Refill Request    Has the patient contacted the pharmacy for the refill? Yes   Name of medication being requested: amitriptyline (ELAVIL) 25 MG tablet AND buPROPion (WELLBUTRIN) 75 MG tablet  Provider who prescribed the medication: Dr. Montgomery  Pharmacy: [id number] [place] [address] Phone: [phone]   Date medication is needed: asap  Yunior also requested if Dr. Montgomery could look at the medications that [place] is on and any non specialty ones be sent to this [facility] as well.   Action Taken: Message routed to:  [facility] Cardio    Travel Screening: Not Applicable

## 2021-09-09 NOTE — TELEPHONE ENCOUNTER
I reviewed Jim Willingham 8/30 BAL culture results. 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  Given his transplant status, cavitary lesion, recent COVID diagnosis I will opt to treat with voriconazole.  He is on colchicine and tacrolimus.   -Will inquire with Dr. Pettit if Jim can stop the colchicine given the drug drug interaction (he has recently received anakinra). Similar drug drug interaction with posaconazole.  -Voriconazole 300 mg po BID (may need prior authorization)  -Voriconazole level and CMP 7 days after starting the voriconazole.   -Monitor tacrolimus levels more closely while starting voriconazole.  -Spoke with transplant coordinator Kateryna and patient to discuss the above. I also sent a message to Dr. Pettit re colchicine. Per the patient he may be stopping the colchicine this week.      Kateryna Londono  Infectious Diseases

## 2021-09-10 ENCOUNTER — TELEPHONE (OUTPATIENT)
Dept: TRANSPLANT | Facility: CLINIC | Age: 66
End: 2021-09-10

## 2021-09-10 ENCOUNTER — TELEPHONE (OUTPATIENT)
Dept: CARDIOLOGY | Facility: CLINIC | Age: 66
End: 2021-09-10

## 2021-09-10 DIAGNOSIS — Z94.1 S/P ORTHOTOPIC HEART TRANSPLANT (H): ICD-10-CM

## 2021-09-10 RX ORDER — SULFAMETHOXAZOLE AND TRIMETHOPRIM 400; 80 MG/1; MG/1
1 TABLET ORAL
Qty: 30 TABLET | Refills: 1 | Status: SHIPPED | OUTPATIENT
Start: 2021-09-10 | End: 2021-09-11

## 2021-09-10 RX ORDER — TACROLIMUS 1 MG/1
CAPSULE ORAL
Qty: 900 CAPSULE | Refills: 11 | COMMUNITY
Start: 2021-09-10 | End: 2021-09-15

## 2021-09-10 NOTE — TELEPHONE ENCOUNTER
[facility] Call Center    Phone Message    May a detailed message be left on voicemail: yes     Reason for Call: Other: Madeline from [facility] home [facility] would like some orders to conitnue PT  2x aweek for 3 weeks      Action Taken: Message routed to:  [facility] Cardio    Travel Screening: Not Applicable

## 2021-09-10 NOTE — TELEPHONE ENCOUNTER
Pt called in with the following questions or concerns:     Pt received yassine. Pt seeking clarification on dosing. Message sent to Rheumatology. He states he still feels gout, but it is greatly improved.     vori prescription. Is a PA required?     Stop colchicine. Med list updated.      Pt inquiring about when he can get covid vaccine. After discussing with Kateryna Londono, it was decided that he should wait 30-40 days after last positive covid test. Being that he isn't symptomatic and that he just was positive, she thinks its ok to wait a little longer.     Bactrim vs pentamidine. Kateryna Londono prefers bactrim. Pt experiences bronchospasms with pentamidine. Cr 1.5. Message sent to Dr. Montgomery to confirm. Pt should be on until 6 month post heart transplant.     Tushar Londono said ok to CX ID follow up scheduled on 9/29. Wait 4-6 weeks to get repeat CT after starting vori. Then schedule a ID follow up appt.

## 2021-09-10 NOTE — PROGRESS NOTES
Per jackson Wills to resume bactrim 400-80 mg every other day. Prescription sent and patient notified.

## 2021-09-11 DIAGNOSIS — Z94.1 S/P ORTHOTOPIC HEART TRANSPLANT (H): ICD-10-CM

## 2021-09-11 RX ORDER — SULFAMETHOXAZOLE AND TRIMETHOPRIM 400; 80 MG/1; MG/1
1 TABLET ORAL
Qty: 90 TABLET | Refills: 3 | Status: SHIPPED | OUTPATIENT
Start: 2021-09-11 | End: 2021-09-23

## 2021-09-11 NOTE — RESULT ENCOUNTER NOTE
Patient followed in the Transplant clinic.  Transplant RN Coordinator to follow up.    Yudi Correa RN on 9/11/2021 at 8:46 AM

## 2021-09-13 ENCOUNTER — LAB REQUISITION (OUTPATIENT)
Dept: LAB | Facility: CLINIC | Age: 66
End: 2021-09-13
Payer: COMMERCIAL

## 2021-09-13 ENCOUNTER — TELEPHONE (OUTPATIENT)
Dept: INFECTIOUS DISEASES | Facility: CLINIC | Age: 66
End: 2021-09-13

## 2021-09-13 DIAGNOSIS — R19.7 DIARRHEA, UNSPECIFIED: ICD-10-CM

## 2021-09-13 DIAGNOSIS — Z48.21 ENCOUNTER FOR AFTERCARE FOLLOWING HEART TRANSPLANT (H): ICD-10-CM

## 2021-09-13 LAB
ANION GAP SERPL CALCULATED.3IONS-SCNC: 3 MMOL/L (ref 3–14)
BACTERIA BRONCH: NORMAL
BACTERIA BRONCH: NORMAL
BASOPHILS # BLD AUTO: 0.1 10E3/UL (ref 0–0.2)
BASOPHILS NFR BLD AUTO: 1 %
BUN SERPL-MCNC: 17 MG/DL (ref 7–30)
C DIFF TOX B STL QL: NEGATIVE
CALCIUM SERPL-MCNC: 8.3 MG/DL (ref 8.5–10.1)
CHLORIDE BLD-SCNC: 112 MMOL/L (ref 94–109)
CO2 SERPL-SCNC: 24 MMOL/L (ref 20–32)
CREAT SERPL-MCNC: 1.49 MG/DL (ref 0.66–1.25)
EOSINOPHIL # BLD AUTO: 0.2 10E3/UL (ref 0–0.7)
EOSINOPHIL NFR BLD AUTO: 3 %
ERYTHROCYTE [DISTWIDTH] IN BLOOD BY AUTOMATED COUNT: 20.6 % (ref 10–15)
GFR SERPL CREATININE-BSD FRML MDRD: 49 ML/MIN/1.73M2
GLUCOSE BLD-MCNC: 85 MG/DL (ref 70–99)
HCT VFR BLD AUTO: 31.3 % (ref 40–53)
HGB BLD-MCNC: 9.4 G/DL (ref 13.3–17.7)
IMM GRANULOCYTES # BLD: 0.1 10E3/UL
IMM GRANULOCYTES NFR BLD: 2 %
LYMPHOCYTES # BLD AUTO: 1.5 10E3/UL (ref 0.8–5.3)
LYMPHOCYTES NFR BLD AUTO: 19 %
MAGNESIUM SERPL-MCNC: 1.8 MG/DL (ref 1.6–2.3)
MCH RBC QN AUTO: 28.1 PG (ref 26.5–33)
MCHC RBC AUTO-ENTMCNC: 30 G/DL (ref 31.5–36.5)
MCV RBC AUTO: 94 FL (ref 78–100)
MONOCYTES # BLD AUTO: 1.1 10E3/UL (ref 0–1.3)
MONOCYTES NFR BLD AUTO: 14 %
NEUTROPHILS # BLD AUTO: 4.8 10E3/UL (ref 1.6–8.3)
NEUTROPHILS NFR BLD AUTO: 61 %
NRBC # BLD AUTO: 0 10E3/UL
NRBC BLD AUTO-RTO: 0 /100
PHOSPHATE SERPL-MCNC: 2 MG/DL (ref 2.5–4.5)
PLATELET # BLD AUTO: 322 10E3/UL (ref 150–450)
POTASSIUM BLD-SCNC: 3.3 MMOL/L (ref 3.4–5.3)
RBC # BLD AUTO: 3.34 10E6/UL (ref 4.4–5.9)
SODIUM SERPL-SCNC: 139 MMOL/L (ref 133–144)
WBC # BLD AUTO: 7.8 10E3/UL (ref 4–11)

## 2021-09-13 PROCEDURE — 87493 C DIFF AMPLIFIED PROBE: CPT | Performed by: INTERNAL MEDICINE

## 2021-09-13 PROCEDURE — 85025 COMPLETE CBC W/AUTO DIFF WBC: CPT | Performed by: INTERNAL MEDICINE

## 2021-09-13 PROCEDURE — 83735 ASSAY OF MAGNESIUM: CPT | Performed by: INTERNAL MEDICINE

## 2021-09-13 PROCEDURE — 84100 ASSAY OF PHOSPHORUS: CPT | Performed by: INTERNAL MEDICINE

## 2021-09-13 PROCEDURE — 80048 BASIC METABOLIC PNL TOTAL CA: CPT | Performed by: INTERNAL MEDICINE

## 2021-09-13 NOTE — TELEPHONE ENCOUNTER
Prior Authorization Approval    Authorization Effective Date: 8/11/2021  Authorization Expiration Date: 9/10/2022  Medication: Voriconazole  Approved Dose/Quantity: 90  Reference #:     Insurance Company: [employer] - Phone [phone] Fax [phone]  Expected CoPay:       CoPay Card Available:      Foundation Assistance Needed:    Which Pharmacy is filling the prescription (Not needed for infusion/clinic administered): [facility] - [place], [place]  [facility] [address]  Pharmacy Notified: Yes  Patient Notified: Yes

## 2021-09-15 ENCOUNTER — TELEPHONE (OUTPATIENT)
Dept: CARDIOLOGY | Facility: CLINIC | Age: 66
End: 2021-09-15

## 2021-09-15 ENCOUNTER — TELEPHONE (OUTPATIENT)
Dept: TRANSPLANT | Facility: CLINIC | Age: 66
End: 2021-09-15

## 2021-09-15 ENCOUNTER — PRE VISIT (OUTPATIENT)
Dept: CARDIOLOGY | Facility: CLINIC | Age: 66
End: 2021-09-15

## 2021-09-15 DIAGNOSIS — Z94.1 S/P ORTHOTOPIC HEART TRANSPLANT (H): ICD-10-CM

## 2021-09-15 DIAGNOSIS — Z94.1 TRANSPLANTED HEART (H): Primary | ICD-10-CM

## 2021-09-15 DIAGNOSIS — Z13.220 LIPID SCREENING: ICD-10-CM

## 2021-09-15 RX ORDER — TACROLIMUS 1 MG/1
CAPSULE ORAL
Qty: 900 CAPSULE | Refills: 11 | Status: SHIPPED | OUTPATIENT
Start: 2021-09-15 | End: 2021-09-17

## 2021-09-15 RX ORDER — LIDOCAINE 40 MG/G
CREAM TOPICAL
Status: CANCELLED | OUTPATIENT
Start: 2021-09-15

## 2021-09-15 NOTE — TELEPHONE ENCOUNTER
After discussing with Akshat Parkinson in pharmacy, pt directed to decrease tacro to 2 mg twice a day when he starts vori and repeat a level on Friday.     Message sent to schedulers to schedule follow of CT of chest and appt with ID 4-6 weeks from starting vori.

## 2021-09-15 NOTE — TELEPHONE ENCOUNTER
Pt called to inform me he will be receiving his vori in the mail today. Message sent to pharmacy to advise on what dose of tacrolimus to decrease to. Pt to hold off starting until he hears from me. Pt verbalizes understanding.

## 2021-09-15 NOTE — TELEPHONE ENCOUNTER
Spoke with the patient and confirmed CT csn and ID appointments on 10/15/21 and 10/21/21.  Informed patient an itinerary can be accessed on [employer].

## 2021-09-16 ENCOUNTER — LAB REQUISITION (OUTPATIENT)
Dept: LAB | Facility: CLINIC | Age: 66
End: 2021-09-16
Payer: COMMERCIAL

## 2021-09-16 DIAGNOSIS — Z48.21 ENCOUNTER FOR AFTERCARE FOLLOWING HEART TRANSPLANT (H): ICD-10-CM

## 2021-09-16 LAB
ANION GAP SERPL CALCULATED.3IONS-SCNC: 5 MMOL/L (ref 3–14)
BASOPHILS # BLD AUTO: 0.1 10E3/UL (ref 0–0.2)
BASOPHILS NFR BLD AUTO: 1 %
BUN SERPL-MCNC: 16 MG/DL (ref 7–30)
CALCIUM SERPL-MCNC: 8.2 MG/DL (ref 8.5–10.1)
CHLORIDE BLD-SCNC: 111 MMOL/L (ref 94–109)
CO2 SERPL-SCNC: 24 MMOL/L (ref 20–32)
CREAT SERPL-MCNC: 1.55 MG/DL (ref 0.66–1.25)
EOSINOPHIL # BLD AUTO: 0.1 10E3/UL (ref 0–0.7)
EOSINOPHIL NFR BLD AUTO: 1 %
ERYTHROCYTE [DISTWIDTH] IN BLOOD BY AUTOMATED COUNT: 21.1 % (ref 10–15)
GFR SERPL CREATININE-BSD FRML MDRD: 47 ML/MIN/1.73M2
GLUCOSE BLD-MCNC: 73 MG/DL (ref 70–99)
HCT VFR BLD AUTO: 30.6 % (ref 40–53)
HGB BLD-MCNC: 9.1 G/DL (ref 13.3–17.7)
IMM GRANULOCYTES # BLD: 0.1 10E3/UL
IMM GRANULOCYTES NFR BLD: 2 %
LYMPHOCYTES # BLD AUTO: 1.4 10E3/UL (ref 0.8–5.3)
LYMPHOCYTES NFR BLD AUTO: 20 %
MAGNESIUM SERPL-MCNC: 1.6 MG/DL (ref 1.6–2.3)
MCH RBC QN AUTO: 28.4 PG (ref 26.5–33)
MCHC RBC AUTO-ENTMCNC: 29.7 G/DL (ref 31.5–36.5)
MCV RBC AUTO: 96 FL (ref 78–100)
MONOCYTES # BLD AUTO: 1.1 10E3/UL (ref 0–1.3)
MONOCYTES NFR BLD AUTO: 15 %
NEUTROPHILS # BLD AUTO: 4.2 10E3/UL (ref 1.6–8.3)
NEUTROPHILS NFR BLD AUTO: 61 %
NRBC # BLD AUTO: 0 10E3/UL
NRBC BLD AUTO-RTO: 0 /100
OXYHGB MFR BLDV: 64 % (ref 92–100)
PHOSPHATE SERPL-MCNC: 2.1 MG/DL (ref 2.5–4.5)
PLATELET # BLD AUTO: 281 10E3/UL (ref 150–450)
POTASSIUM BLD-SCNC: 3.8 MMOL/L (ref 3.4–5.3)
RBC # BLD AUTO: 3.2 10E6/UL (ref 4.4–5.9)
SODIUM SERPL-SCNC: 140 MMOL/L (ref 133–144)
WBC # BLD AUTO: 7 10E3/UL (ref 4–11)

## 2021-09-16 PROCEDURE — 84100 ASSAY OF PHOSPHORUS: CPT | Performed by: INTERNAL MEDICINE

## 2021-09-16 PROCEDURE — 83735 ASSAY OF MAGNESIUM: CPT | Performed by: INTERNAL MEDICINE

## 2021-09-16 PROCEDURE — 85025 COMPLETE CBC W/AUTO DIFF WBC: CPT | Performed by: INTERNAL MEDICINE

## 2021-09-16 PROCEDURE — 80048 BASIC METABOLIC PNL TOTAL CA: CPT | Performed by: INTERNAL MEDICINE

## 2021-09-16 PROCEDURE — 80197 ASSAY OF TACROLIMUS: CPT | Performed by: INTERNAL MEDICINE

## 2021-09-17 ENCOUNTER — DOCUMENTATION ONLY (OUTPATIENT)
Dept: TRANSPLANT | Facility: CLINIC | Age: 66
End: 2021-09-17

## 2021-09-17 ENCOUNTER — TELEPHONE (OUTPATIENT)
Dept: FAMILY MEDICINE | Facility: CLINIC | Age: 66
End: 2021-09-17

## 2021-09-17 DIAGNOSIS — Z94.1 S/P ORTHOTOPIC HEART TRANSPLANT (H): ICD-10-CM

## 2021-09-17 LAB
TACROLIMUS BLD-MCNC: 5.9 UG/L (ref 5–15)
TME LAST DOSE: NORMAL H
TME LAST DOSE: NORMAL H

## 2021-09-17 RX ORDER — TACROLIMUS 1 MG/1
CAPSULE ORAL
Qty: 900 CAPSULE | Refills: 11 | Status: SHIPPED | OUTPATIENT
Start: 2021-09-17 | End: 2021-09-24

## 2021-09-17 NOTE — RESULT ENCOUNTER NOTE
Patient followed in the Transplant clinic.  Transplant RN Coordinator to follow up.    Yudi Correa RN on 9/17/2021 at 11:50 AM

## 2021-09-17 NOTE — PROGRESS NOTES
Pt called to discuss the following:     Foot drop discussed with Dr. Montgomery. She recommends following up with your PMD for further evaluation.     Pt is currently working on getting in with Dr. Gómez, his PMD. Pt also directed to present to ortho walk in clinic for evaluation and possible boot fit.     Pt verbalized understanding and agrees with plan.     Tacro level 5.9. Confirmed 12 hour trough. Pt started vori on 9/16. Goal 8-10. Current dose 2 mg bid. Instructions given to increase to 3/2. Repeat next week. Med list updated.

## 2021-09-20 ENCOUNTER — TELEPHONE (OUTPATIENT)
Dept: TRANSPLANT | Facility: CLINIC | Age: 66
End: 2021-09-20

## 2021-09-20 ENCOUNTER — LAB REQUISITION (OUTPATIENT)
Dept: LAB | Facility: CLINIC | Age: 66
End: 2021-09-20
Payer: COMMERCIAL

## 2021-09-20 ENCOUNTER — MEDICAL CORRESPONDENCE (OUTPATIENT)
Dept: HEALTH INFORMATION MANAGEMENT | Facility: CLINIC | Age: 66
End: 2021-09-20

## 2021-09-20 ENCOUNTER — TELEPHONE (OUTPATIENT)
Dept: CARDIOLOGY | Facility: CLINIC | Age: 66
End: 2021-09-20

## 2021-09-20 DIAGNOSIS — Z48.21 ENCOUNTER FOR AFTERCARE FOLLOWING HEART TRANSPLANT (H): ICD-10-CM

## 2021-09-20 LAB
ANION GAP SERPL CALCULATED.3IONS-SCNC: 9 MMOL/L (ref 3–14)
BUN SERPL-MCNC: 26 MG/DL (ref 7–30)
CALCIUM SERPL-MCNC: 8.3 MG/DL (ref 8.5–10.1)
CHLORIDE BLD-SCNC: 110 MMOL/L (ref 94–109)
CO2 SERPL-SCNC: 23 MMOL/L (ref 20–32)
CREAT SERPL-MCNC: 1.93 MG/DL (ref 0.66–1.25)
ERYTHROCYTE [DISTWIDTH] IN BLOOD BY AUTOMATED COUNT: 20.6 % (ref 10–15)
GFR SERPL CREATININE-BSD FRML MDRD: 36 ML/MIN/1.73M2
GLUCOSE BLD-MCNC: 92 MG/DL (ref 70–99)
HCT VFR BLD AUTO: 30.7 % (ref 40–53)
HGB BLD-MCNC: 9.2 G/DL (ref 13.3–17.7)
MCH RBC QN AUTO: 28.4 PG (ref 26.5–33)
MCHC RBC AUTO-ENTMCNC: 30 G/DL (ref 31.5–36.5)
MCV RBC AUTO: 95 FL (ref 78–100)
PLATELET # BLD AUTO: 232 10E3/UL (ref 150–450)
POTASSIUM BLD-SCNC: 3.5 MMOL/L (ref 3.4–5.3)
RBC # BLD AUTO: 3.24 10E6/UL (ref 4.4–5.9)
SODIUM SERPL-SCNC: 142 MMOL/L (ref 133–144)
TACROLIMUS BLD-MCNC: 25.1 UG/L (ref 5–15)
TME LAST DOSE: ABNORMAL H
TME LAST DOSE: ABNORMAL H
WBC # BLD AUTO: 5.5 10E3/UL (ref 4–11)

## 2021-09-20 PROCEDURE — 85027 COMPLETE CBC AUTOMATED: CPT | Performed by: INTERNAL MEDICINE

## 2021-09-20 PROCEDURE — 80197 ASSAY OF TACROLIMUS: CPT | Performed by: INTERNAL MEDICINE

## 2021-09-20 PROCEDURE — 80048 BASIC METABOLIC PNL TOTAL CA: CPT | Performed by: INTERNAL MEDICINE

## 2021-09-20 ASSESSMENT — ENCOUNTER SYMPTOMS
LIGHT-HEADEDNESS: 1
MUSCLE WEAKNESS: 1
DECREASED APPETITE: 0
MYALGIAS: 1
HYPOTENSION: 0
SLEEP DISTURBANCES DUE TO BREATHING: 0
ABDOMINAL PAIN: 0
RECTAL PAIN: 0
FEVER: 0
WEIGHT GAIN: 0
BLOOD IN STOOL: 0
SPEECH CHANGE: 1
SPUTUM PRODUCTION: 0
CHILLS: 1
ORTHOPNEA: 0
CONSTIPATION: 0
HEADACHES: 1
TINGLING: 1
DOUBLE VISION: 0
SMELL DISTURBANCE: 0
SINUS CONGESTION: 0
PALPITATIONS: 0
HEMOPTYSIS: 0
STIFFNESS: 1
EYE WATERING: 0
WEAKNESS: 1
SYNCOPE: 0
DYSPNEA ON EXERTION: 1
DISTURBANCES IN COORDINATION: 1
TROUBLE SWALLOWING: 0
ARTHRALGIAS: 1
SORE THROAT: 0
EYE IRRITATION: 0
POLYPHAGIA: 0
POLYDIPSIA: 0
HEARTBURN: 0
MUSCLE CRAMPS: 0
HYPERTENSION: 0
INCREASED ENERGY: 1
ALTERED TEMPERATURE REGULATION: 1
WEIGHT LOSS: 1
NUMBNESS: 1
HOARSE VOICE: 1
JAUNDICE: 0
BOWEL INCONTINENCE: 0
SINUS PAIN: 0
MEMORY LOSS: 1
LEG PAIN: 0
SNORES LOUDLY: 0
SHORTNESS OF BREATH: 1
EYE REDNESS: 0
WHEEZING: 1
COUGH: 0
SEIZURES: 0
JOINT SWELLING: 0
TASTE DISTURBANCE: 0
POSTURAL DYSPNEA: 0
BACK PAIN: 1
BLOATING: 0
NECK PAIN: 0
EXERCISE INTOLERANCE: 1
COUGH DISTURBING SLEEP: 0
FATIGUE: 1
DIARRHEA: 1
NAUSEA: 0
HALLUCINATIONS: 1
NIGHT SWEATS: 0
EYE PAIN: 0
DIZZINESS: 1
VOMITING: 0
LOSS OF CONSCIOUSNESS: 0
TREMORS: 1
NECK MASS: 0

## 2021-09-20 NOTE — NURSING NOTE
Transplant Coordinator Note    Reason for visit: 4 month follow up  Coordinator: Present       Health concerns addressed today:  1. Bump in cr~tac level high.   2. Foot drop~ ortho referral submitted.  3. Blurry vision?  4. Fungal infection~on vori. ID follow up scheduled.   5. Diarrhea? Resolved.   6. Statin deferred for elevated LFT's while inpatient. Hold off now.   7. New tac goal 6-8.   8. If hbx is negative, ok to stop pred and bactrim.       Immunosuppressants:  Tacro level 5.9. Confirmed 12 hour trough. Pt started vori on 9/16. Goal 8-10. Current dose 2 mg bid. Instructions given to increase to 3/2. Repeat next week. Med list updated.      bid    No DSAS  Baseline angiogram? Deferred for now.   Immuknow 323 8/5      Resulted labs reviewed with patient  Medication record reviewed and reconciled  Questions and concerns addressed  Pt verbalized an understanding of plan of care.     Patient Instructions  1. I put in ortho referral to follow up your left foot drop.  2. Hold tacro until Thursday. Repeat labs Thursday morning then we will discuss what dose to restart.   3. Hold bactrim until Thursday as well.   4. Continue to increase your activity.   5. Covid vaccines 30-40 days after your covid positive test.    6. Keep up the good work! Hang in there!     Next transplant clinic appointment:  10/5  Next lab draw: Thursday  Coordinator will call with all pending results.     Please call transplant coordinator with any questions:    Yolette Rueda RN BSN   Post Heart Transplant Nurse Coordinator  [facility]  Questions: [phone]

## 2021-09-20 NOTE — TELEPHONE ENCOUNTER
Call complete for pre procedure reminder, travel screen and updated visitor policy.    Per heart transplant criteria, Covid test not done.

## 2021-09-20 NOTE — TELEPHONE ENCOUNTER
Home health nurse called stating the following:     C/o Blurry over the weekend. He didn't check a BP at the time, but currently it is normal at 117/88 and . No blood sugar during that time either. No c/o headache    She ricardo a tacro, bmp, and cbc.     Pt encouraged to check BP and blood sugar if blurry vision occurs again.     Will discuss further at appointments on 9/21.

## 2021-09-21 ENCOUNTER — APPOINTMENT (OUTPATIENT)
Dept: MEDSURG UNIT | Facility: CLINIC | Age: 66
End: 2021-09-21
Attending: INTERNAL MEDICINE
Payer: COMMERCIAL

## 2021-09-21 ENCOUNTER — LAB (OUTPATIENT)
Dept: LAB | Facility: CLINIC | Age: 66
End: 2021-09-21
Payer: COMMERCIAL

## 2021-09-21 ENCOUNTER — OFFICE VISIT (OUTPATIENT)
Dept: CARDIOLOGY | Facility: CLINIC | Age: 66
End: 2021-09-21
Attending: INTERNAL MEDICINE
Payer: COMMERCIAL

## 2021-09-21 ENCOUNTER — HOSPITAL ENCOUNTER (OUTPATIENT)
Facility: CLINIC | Age: 66
Discharge: HOME | End: 2021-09-21
Attending: INTERNAL MEDICINE | Admitting: INTERNAL MEDICINE
Payer: COMMERCIAL

## 2021-09-21 VITALS
HEIGHT: 68 IN | BODY MASS INDEX: 28.19 KG/M2 | WEIGHT: 186 LBS | TEMPERATURE: 97.8 F | OXYGEN SATURATION: 99 % | HEART RATE: 103 BPM | RESPIRATION RATE: 20 BRPM | SYSTOLIC BLOOD PRESSURE: 134 MMHG | DIASTOLIC BLOOD PRESSURE: 84 MMHG

## 2021-09-21 VITALS
OXYGEN SATURATION: 99 % | WEIGHT: 186 LBS | SYSTOLIC BLOOD PRESSURE: 134 MMHG | BODY MASS INDEX: 28.19 KG/M2 | HEIGHT: 68 IN | HEART RATE: 112 BPM | DIASTOLIC BLOOD PRESSURE: 87 MMHG

## 2021-09-21 DIAGNOSIS — Z13.220 LIPID SCREENING: ICD-10-CM

## 2021-09-21 DIAGNOSIS — B44.1 PULMONARY ASPERGILLOSIS (H): ICD-10-CM

## 2021-09-21 DIAGNOSIS — Z94.1 TRANSPLANTED HEART (H): ICD-10-CM

## 2021-09-21 DIAGNOSIS — Z94.1 TRANSPLANTED HEART (H): Primary | ICD-10-CM

## 2021-09-21 LAB
ALBUMIN SERPL-MCNC: 2.8 G/DL (ref 3.4–5)
ALP SERPL-CCNC: 122 U/L (ref 40–150)
ALT SERPL W P-5'-P-CCNC: 16 U/L (ref 0–70)
ANION GAP SERPL CALCULATED.3IONS-SCNC: 9 MMOL/L (ref 3–14)
AST SERPL W P-5'-P-CCNC: 19 U/L (ref 0–45)
BASOPHILS # BLD AUTO: 0 10E3/UL (ref 0–0.2)
BASOPHILS NFR BLD AUTO: 1 %
BILIRUB SERPL-MCNC: 0.3 MG/DL (ref 0.2–1.3)
BUN SERPL-MCNC: 29 MG/DL (ref 7–30)
CALCIUM SERPL-MCNC: 8.4 MG/DL (ref 8.5–10.1)
CHLORIDE BLD-SCNC: 114 MMOL/L (ref 94–109)
CHOLEST SERPL-MCNC: 146 MG/DL
CK SERPL-CCNC: 57 U/L (ref 30–300)
CMV DNA SPEC NAA+PROBE-ACNC: NOT DETECTED IU/ML
CO2 SERPL-SCNC: 21 MMOL/L (ref 20–32)
CREAT SERPL-MCNC: 2.39 MG/DL (ref 0.66–1.25)
EOSINOPHIL # BLD AUTO: 0.1 10E3/UL (ref 0–0.7)
EOSINOPHIL NFR BLD AUTO: 3 %
ERYTHROCYTE [DISTWIDTH] IN BLOOD BY AUTOMATED COUNT: 20.5 % (ref 10–15)
FASTING STATUS PATIENT QL REPORTED: NO
GFR SERPL CREATININE-BSD FRML MDRD: 28 ML/MIN/1.73M2
GLUCOSE BLD-MCNC: 151 MG/DL (ref 70–99)
HCT VFR BLD AUTO: 32.3 % (ref 40–53)
HDLC SERPL-MCNC: 67 MG/DL
HGB BLD-MCNC: 9.1 G/DL (ref 13.3–17.7)
HGB BLD-MCNC: 9.3 G/DL (ref 13.3–17.7)
HGB BLD-MCNC: 9.6 G/DL (ref 13.3–17.7)
IMM GRANULOCYTES # BLD: 0.1 10E3/UL
IMM GRANULOCYTES NFR BLD: 2 %
LDLC SERPL CALC-MCNC: 60 MG/DL
LYMPHOCYTES # BLD AUTO: 1.4 10E3/UL (ref 0.8–5.3)
LYMPHOCYTES NFR BLD AUTO: 30 %
MAGNESIUM SERPL-MCNC: 2 MG/DL (ref 1.6–2.3)
MCH RBC QN AUTO: 28.6 PG (ref 26.5–33)
MCHC RBC AUTO-ENTMCNC: 29.7 G/DL (ref 31.5–36.5)
MCV RBC AUTO: 96 FL (ref 78–100)
MONOCYTES # BLD AUTO: 0.6 10E3/UL (ref 0–1.3)
MONOCYTES NFR BLD AUTO: 13 %
NEUTROPHILS # BLD AUTO: 2.4 10E3/UL (ref 1.6–8.3)
NEUTROPHILS NFR BLD AUTO: 51 %
NONHDLC SERPL-MCNC: 79 MG/DL
NRBC # BLD AUTO: 0 10E3/UL
NRBC BLD AUTO-RTO: 0 /100
OXYHGB MFR BLDA: 87 % (ref 92–100)
OXYHGB MFR BLDV: 68 % (ref 92–100)
PHOSPHATE SERPL-MCNC: 2.9 MG/DL (ref 2.5–4.5)
PLATELET # BLD AUTO: 224 10E3/UL (ref 150–450)
POTASSIUM BLD-SCNC: 3.7 MMOL/L (ref 3.4–5.3)
PROT SERPL-MCNC: 6.4 G/DL (ref 6.8–8.8)
RBC # BLD AUTO: 3.36 10E6/UL (ref 4.4–5.9)
SODIUM SERPL-SCNC: 144 MMOL/L (ref 133–144)
TRIGL SERPL-MCNC: 94 MG/DL
WBC # BLD AUTO: 4.6 10E3/UL (ref 4–11)

## 2021-09-21 PROCEDURE — 250N000009 HC RX 250: Performed by: INTERNAL MEDICINE

## 2021-09-21 PROCEDURE — 80061 LIPID PANEL: CPT | Performed by: PATHOLOGY

## 2021-09-21 PROCEDURE — 84100 ASSAY OF PHOSPHORUS: CPT | Performed by: PATHOLOGY

## 2021-09-21 PROCEDURE — 88307 TISSUE EXAM BY PATHOLOGIST: CPT | Mod: 26 | Performed by: PATHOLOGY

## 2021-09-21 PROCEDURE — 80285 DRUG ASSAY VORICONAZOLE: CPT | Mod: 90 | Performed by: PATHOLOGY

## 2021-09-21 PROCEDURE — 82810 BLOOD GASES O2 SAT ONLY: CPT

## 2021-09-21 PROCEDURE — 82550 ASSAY OF CK (CPK): CPT | Performed by: PATHOLOGY

## 2021-09-21 PROCEDURE — 93505 ENDOMYOCARDIAL BIOPSY: CPT | Performed by: INTERNAL MEDICINE

## 2021-09-21 PROCEDURE — 83735 ASSAY OF MAGNESIUM: CPT | Performed by: PATHOLOGY

## 2021-09-21 PROCEDURE — C1894 INTRO/SHEATH, NON-LASER: HCPCS | Performed by: INTERNAL MEDICINE

## 2021-09-21 PROCEDURE — 88350 IMFLUOR EA ADDL 1ANTB STN PX: CPT | Mod: 26 | Performed by: PATHOLOGY

## 2021-09-21 PROCEDURE — 85018 HEMOGLOBIN: CPT

## 2021-09-21 PROCEDURE — 36415 COLL VENOUS BLD VENIPUNCTURE: CPT | Performed by: PATHOLOGY

## 2021-09-21 PROCEDURE — 999N000132 HC STATISTIC PP CARE STAGE 1

## 2021-09-21 PROCEDURE — G0463 HOSPITAL OUTPT CLINIC VISIT: HCPCS

## 2021-09-21 PROCEDURE — 87799 DETECT AGENT NOS DNA QUANT: CPT | Mod: 90 | Performed by: PATHOLOGY

## 2021-09-21 PROCEDURE — 272N000001 HC OR GENERAL SUPPLY STERILE: Performed by: INTERNAL MEDICINE

## 2021-09-21 PROCEDURE — 88350 IMFLUOR EA ADDL 1ANTB STN PX: CPT | Mod: TC | Performed by: INTERNAL MEDICINE

## 2021-09-21 PROCEDURE — 80053 COMPREHEN METABOLIC PANEL: CPT | Performed by: PATHOLOGY

## 2021-09-21 PROCEDURE — 85025 COMPLETE CBC W/AUTO DIFF WBC: CPT | Performed by: PATHOLOGY

## 2021-09-21 PROCEDURE — 99215 OFFICE O/P EST HI 40 MIN: CPT | Performed by: INTERNAL MEDICINE

## 2021-09-21 PROCEDURE — 88346 IMFLUOR 1ST 1ANTB STAIN PX: CPT | Mod: 26 | Performed by: PATHOLOGY

## 2021-09-21 RX ORDER — LIDOCAINE 40 MG/G
CREAM TOPICAL
Status: COMPLETED | OUTPATIENT
Start: 2021-09-21 | End: 2021-09-21

## 2021-09-21 RX ADMIN — LIDOCAINE: 40 CREAM TOPICAL at 10:40

## 2021-09-21 ASSESSMENT — MIFFLIN-ST. JEOR
SCORE: 1606.81
SCORE: 1608.19

## 2021-09-21 ASSESSMENT — PAIN SCALES - GENERAL: PAINLEVEL: NO PAIN (0)

## 2021-09-21 NOTE — NURSING NOTE
Chief Complaint   Patient presents with     Follow Up     ump return heart tx     Vitals were taken and medications reconciled.    Pablo Mitchell, EMT  8:46 AM

## 2021-09-21 NOTE — DISCHARGE INSTRUCTIONS
[facility]                        Interventional Cardiology  Discharge Instructions   Post Right Heart Cath      AFTER YOU GO HOME:    DO drink plenty of fluids    DO resume your regular diet and medications unless otherwise instructed by your Primary Physician    Do Not scrub the procedure site vigorously    No lotion or powder to the puncture site for 3 days    CALL YOUR PRIMARY PHYSICIAN IF: You may resume all normal activity.  Monitor neck site for bleeding, swelling, or voice changes. If you notice bleeding or swelling immediately apply pressure to the site and call number below to speak with Cardiology Fellow.  If you experience any changes in your breathing you should call your doctor immediately or come to the closest Emergency Department.  Do not drive yourself.    ADDITIONAL INSTRUCTIONS: Medications: You are to resume all home medications including anticoagulation therapy unless otherwise advised by your primary cardiologist or nurse coordinator.    Follow Up: Per your primary cardiology team    If you have any questions or concerns regarding your procedure site please call [phone] at anytime and ask for Cardiology Fellow on call.  They are available 24 hours a day.  You may also contact the Cardiology Clinic after hours number at [phone].                                                       Telephone Numbers [phone] Monday-Friday 8:00 am to 4:30 pm    [phone] After 4:30 pm Monday-Friday, Weekends & Holidays  Ask for Interventional Cardiologist on call. Someone is on call 24 hours/day   [facility] toll free number [phone] Monday-Friday 8:00 am to 4:30 pm   [facility] Emergency Dept [phone]

## 2021-09-21 NOTE — PATIENT INSTRUCTIONS
Patient Instructions  1. I put in ortho referral to follow up your left foot drop.  2. Hold tacro until Thursday. Repeat labs Thursday morning then we will discuss what dose to restart.   3. Hold bactrim until Thursday as well.   4. Continue to increase your activity.   5. Covid vaccines 30-40 days after your covid positive test.    6. Keep up the good work! Hang in there!     Next transplant clinic appointment:  10/5  Next lab draw: Thursday  Coordinator will call with all pending results.     Please call transplant coordinator with any questions:    Yolette Rueda RN BSN   Post Heart Transplant Nurse Coordinator  [facility]  Questions: [phone]

## 2021-09-21 NOTE — LETTER
9/21/2021      RE: Jim Willingham  [address] [facility] [address]       Dear Colleague,    Thank you for the opportunity to participate in the care of your patient, Jim Willingham, at the [facility] [place] [facility]. Please see a copy of my visit note below.    See dictation       Service Date: 09/21/2021    This is a followup heart transplant visit.    HISTORY OF PRESENT ILLNESS:  Briefly, Mr. Willingham is a 66-year-old man with nonischemic cardiomyopathy who had LVAD placement as bridge therapy and ultimately underwent orthotopic heart transplant on 05/06/2021.  His postoperative course has been complex and I will summarize it here.  He had a right pleural leak with a prolonged chest tube.  He had pneumonia in the hospital early after heart transplantation.  He then had later a persistent pleural fluid collection, which was exudative, but repeatedly cultured negative.  He was readmitted one time for ongoing weakness and gout pain.  He again underwent therapeutic paracentesis and this was found to be exudative.  It was not chylothorax, aerobic, fungal, anaerobic cultures were all negative.  Repeat imaging of his chest found a right upper lobe cavity lesion, but studies of this were all indeterminate.  Therefore, he was not placed on antifungals at that time.  Chest CT 07/13/2021 had an anterior mediastinal fluid tracking.  Plan was to monitor at that time.  He then developed transaminase elevation with questionable choledocholithiasis.  Ultrasound did not show need for further intervention at that time and LFTs normalized.    He then was admitted with COVID-19 and found to have also a simultaneous left upper lobe cavitary lesion.  He is now on antifungals, which he has been on for 4 or 5 days and tacrolimus level came back extremely elevated today.  Renal function is also worse and he has been shaking further.  He has recently been started  on anakinra for gout.    With report to his overall energy level since transplant, he feels that he will catch his stride and then quickly with each complication that has happened has been weak again.  He reports normal appetite.  No diarrhea right now. He is not coughing or having shortness of breath and does feel his stamina is improving.  He has ongoing low back pain with associated numbness in his foot, which is leading to some gait instability.  He feels a little too weak to be out walking in the neighborhood and has to use steps.  He is doing home physical therapy.    He denies fever or chills.  He is tolerating all medications but does notice more tremulousness in the last few days since starting the voriconazole.  No confusion.    FAMILY HISTORY:  No changes from prior.    SOCIAL HISTORY:  His significant other just lost her job and this may compromise their insurance and they are working with  but this is an additional stressor.    CURRENT MEDICATIONS:  Reviewed in clinic and include allopurinol 300 daily, Elavil 25 at bedtime, anakinra subcutaneous for 3 days, injections as needed, aspirin 81 mg daily, Wellbutrin 75 b.i.d., iron 325 daily, Neurontin 300 3 times a day, melatonin at bedtime, mycophenolate 500 twice daily, Protonix 40 b.i.d., prednisone 5 daily, Bactrim recently started back 400/80 every 48, Prograf 3 mg/2 mg, voriconazole 300 twice a day, CellCept 500 twice a day.    REVIEW OF SYSTEMS:    CONSTITUTIONAL:  No fevers, chills or sweats over the last week.  Weight is overall stable.  Appetite is okay.  ENT:  No visual disturbance, earaches, epistaxis, sore throat.  ALLERGIES:  Negative.  RESPIRATORY:  No cough or hemoptysis.  CARDIOVASCULAR:  As per HPI.  GASTROINTESTINAL:  No nausea, vomiting, hematemesis, melena, hematochezia.  GENITOURINARY:  No urinary frequency, dysuria, hematuria.  SKIN:  Negative.  PSYCHIATRIC:  Definitely feeling stressed about their finances and down about  his current state of health.  NEUROLOGIC:  Right foot drop.  ENDOCRINE:  Blood sugars are controlled.  MUSCULOSKELETAL:  Generalized weakness.    PHYSICAL EXAMINATION:    VITAL SIGNS:  Blood pressure 134/87, pulse 112, weight 186, oxygen saturation 99%, BMI 28.5.  GENERAL:  The patient appears comfortable, in no acute distress.  HEENT:  Anicteric.  Extraocular movements intact.  Oropharynx clear.  No thrush.  NECK:  No thyromegaly or masses.    CARDIOVASCULAR:  Normal S1, S2.  JVP is less than 10.  PMI focal.  No rub.    LUNGS:  Clear to auscultation bilaterally.  No dullness to percussion and no areas of decreased breath sounds.  ABDOMEN:  Soft, nontender, nondistended.  EXTREMITIES:  No clubbing, cyanosis or edema.  Overall, thin.    LABORATORY DATA:  Reviewed today.  Demonstrates creatinine is up to 2.39, which is a big jump. Chloride 114, albumin 2.8.  White blood cell count 4.6, hemoglobin 9.6, CMV negative.  Surgical pathology is pending.  Cardiac catheterization will be later today.  Immuno from 08/05/2021 was 323 showing at goal level of immunosuppression.  Last echocardiogram from 07/23/2021 shows hyperkinetic.  Ejection fraction of 65%-70%, small loculated pericardial effusion near the RV apex which has been present since early July. Donor specific antibodies negative.  He has not yet had a baseline angiogram due to renal dysfunction. Last hemodynamics were normal.    ASSESSMENT AND RECOMMENDATIONS:  In summary, this is a very pleasant 66-year-old man with a cardiac transplant after LVAD as bridge therapy on 05/06/2021 who has had a relatively complex postoperative course.  This is routine post-transplant followup.    Overall, based on the fact that he has had now likely a fungal pneumonia as well as 2 other pneumonias, I feel that we need to be considering lowering his immunosuppression overall which we have already done, but I think we could go further.    I am lowering his tac level to 6-8.  We will  keep him on CellCept 500 b.i.d.  He will hold his tac.  While efforts were made to reduce his tac dose significantly starting voriconazole, it appeared that the vori took a while to kick in and we have clearly over shot today.  We will hold the tac for the next 2 days and get a level on Thursday and make a decision about dosing at that point, but it may be that we have him on 1 mg b.i.d. to achieve 6-8.    Now that his gout is better controlled, I would like to stop his prednisone if this biopsy today is negative.  This will allow us to stop his Bactrim as well, although ID may have a different opinion about whether or not he needs ongoing PJP prophylaxis.  He has ID followup and it may be that they get a T cell subset to make this decision, but my instinct given renal dysfunction (which we have more than one explanation for) would be to stop prednisone and Bactrim now, again if this biopsy is negative.    With respect to his ongoing infectious issues, sero status CMV positive-positive, EBV positive-positive, toxo negative-negative.    Left upper lobe cavitary lesion.  He is currently on voriconazole and has ID followup scheduled.  Again, we are lowering tac considerably for this.  He also has a right exudative effusion with right lower lobe cavitary lesion.  Perhaps this may improve on antifungals, but repeated fungal studies have been negative as have bacterial workup for this culture.    He has a mediastinal fluid collection that we are just following over time.  I suspect we will have another CT at some point.    Chronic kidney disease with WALTER today.  I suspect this is due to tac and Bactrim combined.  We are stopping both today.  Hopefully, we can stay off the Bactrim longer term and run his tac closer to where it needs to be.    Recent transaminitis, questionable choledocholithiasis.  I am holding his statin right now and would prefer to stay off of it until we have simpler times.    Recurrent gout flare.   Appreciate Rheumatology assistance as this limits his mobility.  He is on anakinra as well as allopurinol.    Neuropathy.    Polypharmacy.  Just reviewing all of his medications today including the Neurontin, Elavil, the SSRI and now the voriconazole, I am concerned about QT prolongation and also just toxicity due to the number of medications he is on.  I would like him to see the pharmacist to see whether we can simplify this or engage with his primary care physician/neurology about reducing his number of neuropathy/depression/sleep meds because again I am concerned about toxicity.    For his emphysema, he is on several inhalers and follows with Pulmonary.      I am okay holding off on his baseline angiogram for now and it may be that we just do not get this before a year  He had an extremely young donor.  Therefore, if he ends up having coronary disease at year 1, this will likely be due to graft vasculopathy and not that it came with the donor.  Again, I would like to hold off for now.    We will be seeing him closely in clinic. For his deconditioning, I have really emphasized to him today that walking is the key to get him better.  He is going to see his primary/ortho about his foot drop to address that, but as soon as he is more stable with home PT, he needs to be walking longer and longer and longer distances and working on nutrition.    For his almost lack of insurance, we are having him engage with social work to see what we can do to provide support.            Please do not hesitate to contact me if you have any questions/concerns.     Sincerely,     Delisa Montgomery MD

## 2021-09-21 NOTE — PROGRESS NOTES
Patient returned to 2A post RHC and biopsy.  VSS, right neck site clean/dry/intact, no hematoma, and denies pain. Patient tolerated PO food and fluids. Teaching was done and discharge instructions were given.  Patient appropriate for immediate discharge once dressed and finished eating.  Will discharge independently from hospital to home.

## 2021-09-21 NOTE — PROGRESS NOTES
Prep for RHC/HBX complete. LMX cream on right neck. Labs resulted from earlier clinic appointment. Patient resting in bed watching television. Allergy and fall precautions bands placed. Patient uses cane, sometimes a walker, and has a cane with him today. Patient plans to drive himself home post procedure. Waiting for consent to be signed.     Consent signed.

## 2021-09-22 ENCOUNTER — TELEPHONE (OUTPATIENT)
Dept: TRANSPLANT | Facility: CLINIC | Age: 66
End: 2021-09-22

## 2021-09-22 DIAGNOSIS — Z94.1 TRANSPLANTED HEART (H): Primary | ICD-10-CM

## 2021-09-22 LAB
EBV DNA COPIES/ML, INSTRUMENT: ABNORMAL COPIES/ML
EBV DNA SPEC NAA+PROBE-LOG#: 4.1 {LOG_COPIES}/ML
PATH REPORT.COMMENTS IMP SPEC: NORMAL
PATH REPORT.COMMENTS IMP SPEC: NORMAL
PATH REPORT.FINAL DX SPEC: NORMAL
PATH REPORT.GROSS SPEC: NORMAL
PATH REPORT.MICROSCOPIC SPEC OTHER STN: NORMAL
PATH REPORT.RELEVANT HX SPEC: NORMAL
PHOTO IMAGE: NORMAL

## 2021-09-22 NOTE — TELEPHONE ENCOUNTER
Pt called to discuss having EKG done per Dr. Montgomery. She is concerned about QT prolongation with the start of vori. No answer. Unable to leave a message.

## 2021-09-22 NOTE — TELEPHONE ENCOUNTER
home health notified of importance of getting timed tacro trough during visit tomorrow. Ofelia didn't answer, so JESUS left.

## 2021-09-22 NOTE — PROGRESS NOTES
Service Date: 09/21/2021    This is a followup heart transplant visit.    HISTORY OF PRESENT ILLNESS:  Briefly, Mr. Willingham is a 66-year-old man with nonischemic cardiomyopathy who had LVAD placement as bridge therapy and ultimately underwent orthotopic heart transplant on 05/06/2021.  His postoperative course has been complex and I will summarize it here.  He had a right pleural leak with a prolonged chest tube.  He had pneumonia in the hospital early after heart transplantation.  He then had later a persistent pleural fluid collection, which was exudative, but repeatedly cultured negative.  He was readmitted one time for ongoing weakness and gout pain.  He again underwent therapeutic paracentesis and this was found to be exudative.  It was not chylothorax, aerobic, fungal, anaerobic cultures were all negative.  Repeat imaging of his chest found a right upper lobe cavity lesion, but studies of this were all indeterminate.  Therefore, he was not placed on antifungals at that time.  Chest CT 07/13/2021 had an anterior mediastinal fluid tracking.  Plan was to monitor at that time.  He then developed transaminase elevation with questionable choledocholithiasis.  Ultrasound did not show need for further intervention at that time and LFTs normalized.    He then was admitted with COVID-19 and found to have also a simultaneous left upper lobe cavitary lesion.  He is now on antifungals, which he has been on for 4 or 5 days and tacrolimus level came back extremely elevated today.  Renal function is also worse and he has been shaking further.  He has recently been started on anakinra for gout.    With report to his overall energy level since transplant, he feels that he will catch his stride and then quickly with each complication that has happened has been weak again.  He reports normal appetite.  No diarrhea right now. He is not coughing or having shortness of breath and does feel his stamina is improving.  He has ongoing low  back pain with associated numbness in his foot, which is leading to some gait instability.  He feels a little too weak to be out walking in the neighborhood and has to use steps.  He is doing home physical therapy.    He denies fever or chills.  He is tolerating all medications but does notice more tremulousness in the last few days since starting the voriconazole.  No confusion.    FAMILY HISTORY:  No changes from prior.    SOCIAL HISTORY:  His significant other just lost her job and this may compromise their insurance and they are working with  but this is an additional stressor.    CURRENT MEDICATIONS:  Reviewed in clinic and include allopurinol 300 daily, Elavil 25 at bedtime, anakinra subcutaneous for 3 days, injections as needed, aspirin 81 mg daily, Wellbutrin 75 b.i.d., iron 325 daily, Neurontin 300 3 times a day, melatonin at bedtime, mycophenolate 500 twice daily, Protonix 40 b.i.d., prednisone 5 daily, Bactrim recently started back 400/80 every 48, Prograf 3 mg/2 mg, voriconazole 300 twice a day, CellCept 500 twice a day.    REVIEW OF SYSTEMS:    CONSTITUTIONAL:  No fevers, chills or sweats over the last week.  Weight is overall stable.  Appetite is okay.  ENT:  No visual disturbance, earaches, epistaxis, sore throat.  ALLERGIES:  Negative.  RESPIRATORY:  No cough or hemoptysis.  CARDIOVASCULAR:  As per HPI.  GASTROINTESTINAL:  No nausea, vomiting, hematemesis, melena, hematochezia.  GENITOURINARY:  No urinary frequency, dysuria, hematuria.  SKIN:  Negative.  PSYCHIATRIC:  Definitely feeling stressed about their finances and down about his current state of health.  NEUROLOGIC:  Right foot drop.  ENDOCRINE:  Blood sugars are controlled.  MUSCULOSKELETAL:  Generalized weakness.    PHYSICAL EXAMINATION:    VITAL SIGNS:  Blood pressure 134/87, pulse 112, weight 186, oxygen saturation 99%, BMI 28.5.  GENERAL:  The patient appears comfortable, in no acute distress.  HEENT:  Anicteric.  Extraocular  movements intact.  Oropharynx clear.  No thrush.  NECK:  No thyromegaly or masses.    CARDIOVASCULAR:  Normal S1, S2.  JVP is less than 10.  PMI focal.  No rub.    LUNGS:  Clear to auscultation bilaterally.  No dullness to percussion and no areas of decreased breath sounds.  ABDOMEN:  Soft, nontender, nondistended.  EXTREMITIES:  No clubbing, cyanosis or edema.  Overall, thin.    LABORATORY DATA:  Reviewed today.  Demonstrates creatinine is up to 2.39, which is a big jump. Chloride 114, albumin 2.8.  White blood cell count 4.6, hemoglobin 9.6, CMV negative.  Surgical pathology is pending.  Cardiac catheterization will be later today.  Immuno from 08/05/2021 was 323 showing at goal level of immunosuppression.  Last echocardiogram from 07/23/2021 shows hyperkinetic.  Ejection fraction of 65%-70%, small loculated pericardial effusion near the RV apex which has been present since early July. Donor specific antibodies negative.  He has not yet had a baseline angiogram due to renal dysfunction. Last hemodynamics were normal.    ASSESSMENT AND RECOMMENDATIONS:  In summary, this is a very pleasant 66-year-old man with a cardiac transplant after LVAD as bridge therapy on 05/06/2021 who has had a relatively complex postoperative course.  This is routine post-transplant followup.    Overall, based on the fact that he has had now likely a fungal pneumonia as well as 2 other pneumonias, I feel that we need to be considering lowering his immunosuppression overall which we have already done, but I think we could go further.    I am lowering his tac level to 6-8.  We will keep him on CellCept 500 b.i.d.  He will hold his tac.  While efforts were made to reduce his tac dose significantly starting voriconazole, it appeared that the vori took a while to kick in and we have clearly over shot today.  We will hold the tac for the next 2 days and get a level on Thursday and make a decision about dosing at that point, but it may be that we  have him on 1 mg b.i.d. to achieve 6-8.    Now that his gout is better controlled, I would like to stop his prednisone if this biopsy today is negative.  This will allow us to stop his Bactrim as well, although ID may have a different opinion about whether or not he needs ongoing PJP prophylaxis.  He has ID followup and it may be that they get a T cell subset to make this decision, but my instinct given renal dysfunction (which we have more than one explanation for) would be to stop prednisone and Bactrim now, again if this biopsy is negative.    With respect to his ongoing infectious issues, sero status CMV positive-positive, EBV positive-positive, toxo negative-negative.    Left upper lobe cavitary lesion.  He is currently on voriconazole and has ID followup scheduled.  Again, we are lowering tac considerably for this.  He also has a right exudative effusion with right lower lobe cavitary lesion.  Perhaps this may improve on antifungals, but repeated fungal studies have been negative as have bacterial workup for this culture.    He has a mediastinal fluid collection that we are just following over time.  I suspect we will have another CT at some point.    Chronic kidney disease with WALTER today.  I suspect this is due to tac and Bactrim combined.  We are stopping both today.  Hopefully, we can stay off the Bactrim longer term and run his tac closer to where it needs to be.    Recent transaminitis, questionable choledocholithiasis.  I am holding his statin right now and would prefer to stay off of it until we have simpler times.    Recurrent gout flare.  Appreciate Rheumatology assistance as this limits his mobility.  He is on anakinra as well as allopurinol.    Neuropathy.    Polypharmacy.  Just reviewing all of his medications today including the Neurontin, Elavil, the SSRI and now the voriconazole, I am concerned about QT prolongation and also just toxicity due to the number of medications he is on.  I would like  him to see the pharmacist to see whether we can simplify this or engage with his primary care physician/neurology about reducing his number of neuropathy/depression/sleep meds because again I am concerned about toxicity.    For his emphysema, he is on several inhalers and follows with Pulmonary.      I am okay holding off on his baseline angiogram for now and it may be that we just do not get this before a year  He had an extremely young donor.  Therefore, if he ends up having coronary disease at year 1, this will likely be due to graft vasculopathy and not that it came with the donor.  Again, I would like to hold off for now.    We will be seeing him closely in clinic. For his deconditioning, I have really emphasized to him today that walking is the key to get him better.  He is going to see his primary/ortho about his foot drop to address that, but as soon as he is more stable with home PT, he needs to be walking longer and longer and longer distances and working on nutrition.    For his almost lack of insurance, we are having him engage with social work to see what we can do to provide support.    Delisa Montgomery MD

## 2021-09-23 ENCOUNTER — TELEPHONE (OUTPATIENT)
Dept: TRANSPLANT | Facility: CLINIC | Age: 66
End: 2021-09-23

## 2021-09-23 DIAGNOSIS — Z94.1 TRANSPLANTED HEART (H): Primary | ICD-10-CM

## 2021-09-23 LAB — VORICONAZOLE SERPL-MCNC: 5.2 UG/ML (ref 1–5.5)

## 2021-09-23 NOTE — TELEPHONE ENCOUNTER
Home health is currently in patients home and isn't able to drawn blood. She's attempted 4x's. Pt due for a tacro level since holding since Tuesday for an elevated level. Pt to make an appt at [facility] to have level drawn.

## 2021-09-23 NOTE — PROGRESS NOTES
Negative heart biopsy. Per last clinic visit, ok to stop bactrim and prednisone. Pt aware and medlist updated.

## 2021-09-24 ENCOUNTER — OFFICE VISIT (OUTPATIENT)
Dept: CARDIOLOGY | Facility: CLINIC | Age: 66
End: 2021-09-24
Attending: INTERNAL MEDICINE
Payer: COMMERCIAL

## 2021-09-24 ENCOUNTER — LAB (OUTPATIENT)
Dept: LAB | Facility: CLINIC | Age: 66
End: 2021-09-24
Payer: COMMERCIAL

## 2021-09-24 ENCOUNTER — TELEPHONE (OUTPATIENT)
Dept: TRANSPLANT | Facility: CLINIC | Age: 66
End: 2021-09-24

## 2021-09-24 ENCOUNTER — MEDICAL CORRESPONDENCE (OUTPATIENT)
Dept: HEALTH INFORMATION MANAGEMENT | Facility: CLINIC | Age: 66
End: 2021-09-24

## 2021-09-24 DIAGNOSIS — Z94.1 TRANSPLANTED HEART (H): ICD-10-CM

## 2021-09-24 DIAGNOSIS — M1A.3790 CHRONIC GOUT DUE TO RENAL IMPAIRMENT INVOLVING FOOT WITHOUT TOPHUS, UNSPECIFIED LATERALITY: ICD-10-CM

## 2021-09-24 DIAGNOSIS — Z94.1 S/P ORTHOTOPIC HEART TRANSPLANT (H): ICD-10-CM

## 2021-09-24 LAB
ALLOMAP SCORE (EXTERNAL): 35 (ref 0–40)
ANION GAP SERPL CALCULATED.3IONS-SCNC: 8 MMOL/L (ref 3–14)
BUN SERPL-MCNC: 29 MG/DL (ref 7–30)
CALCIUM SERPL-MCNC: 8.6 MG/DL (ref 8.5–10.1)
CHLORIDE BLD-SCNC: 112 MMOL/L (ref 94–109)
CO2 SERPL-SCNC: 20 MMOL/L (ref 20–32)
CREAT SERPL-MCNC: 2.17 MG/DL (ref 0.66–1.25)
ERYTHROCYTE [DISTWIDTH] IN BLOOD BY AUTOMATED COUNT: 20.5 % (ref 10–15)
GFR SERPL CREATININE-BSD FRML MDRD: 31 ML/MIN/1.73M2
GLUCOSE BLD-MCNC: 124 MG/DL (ref 70–99)
HCT VFR BLD AUTO: 33.3 % (ref 40–53)
HGB BLD-MCNC: 10 G/DL (ref 13.3–17.7)
MCH RBC QN AUTO: 28.7 PG (ref 26.5–33)
MCHC RBC AUTO-ENTMCNC: 30 G/DL (ref 31.5–36.5)
MCV RBC AUTO: 96 FL (ref 78–100)
NEGATIVE PREDICTIVE VALUE PERCENT (EXTERNAL): 98.1 %
PLATELET # BLD AUTO: 214 10E3/UL (ref 150–450)
POSITIVE PREDICTIVE VALUE PERCENT (EXTERNAL): 5.7 %
POTASSIUM BLD-SCNC: 4.8 MMOL/L (ref 3.4–5.3)
RBC # BLD AUTO: 3.48 10E6/UL (ref 4.4–5.9)
SODIUM SERPL-SCNC: 140 MMOL/L (ref 133–144)
TACROLIMUS BLD-MCNC: 7.2 UG/L (ref 5–15)
TME LAST DOSE: NORMAL H
TME LAST DOSE: NORMAL H
URATE SERPL-MCNC: 4.4 MG/DL (ref 3.5–7.2)
WBC # BLD AUTO: 8 10E3/UL (ref 4–11)

## 2021-09-24 PROCEDURE — 93005 ELECTROCARDIOGRAM TRACING: CPT

## 2021-09-24 PROCEDURE — 84550 ASSAY OF BLOOD/URIC ACID: CPT | Performed by: PATHOLOGY

## 2021-09-24 PROCEDURE — G0463 HOSPITAL OUTPT CLINIC VISIT: HCPCS | Mod: 25

## 2021-09-24 PROCEDURE — 80197 ASSAY OF TACROLIMUS: CPT | Mod: 90 | Performed by: PATHOLOGY

## 2021-09-24 PROCEDURE — 80048 BASIC METABOLIC PNL TOTAL CA: CPT | Performed by: PATHOLOGY

## 2021-09-24 PROCEDURE — 36415 COLL VENOUS BLD VENIPUNCTURE: CPT | Performed by: PATHOLOGY

## 2021-09-24 PROCEDURE — 85027 COMPLETE CBC AUTOMATED: CPT | Performed by: PATHOLOGY

## 2021-09-24 RX ORDER — TACROLIMUS 1 MG/1
CAPSULE ORAL
Qty: 900 CAPSULE | Refills: 11 | Status: SHIPPED | OUTPATIENT
Start: 2021-09-24 | End: 2021-10-01

## 2021-09-24 NOTE — LETTER
9/24/2021      RE: Jim Willingham  [address] [facility] [address]       Dear Colleague,    Thank you for the opportunity to participate in the care of your patient, Jim Willingham, at the [facility] HE[facility]. Please see a copy of my visit note below.    .      Please do not hesitate to contact me if you have any questions/concerns.     Sincerely,      CVC CMA

## 2021-09-24 NOTE — LETTER
9/24/2021      RE: Jim Willingham  [address] [facility]  [place] [address]       .       CVC CMA

## 2021-09-24 NOTE — TELEPHONE ENCOUNTER
Tac level 7.2. Goal 6-8. Good 12 hour trough. Pt currently holding tac for an elevated level. Pt instructed to resume 2 mg in am and 1 mg in pm. Repeat on Monday with home health.     Ekg completed in clinic. Reviewed with Dr. Montgomery. No further concerns at this time.

## 2021-09-27 ENCOUNTER — TELEPHONE (OUTPATIENT)
Dept: TRANSPLANT | Facility: CLINIC | Age: 66
End: 2021-09-27

## 2021-09-27 ENCOUNTER — TELEPHONE (OUTPATIENT)
Dept: CARDIOLOGY | Facility: CLINIC | Age: 66
End: 2021-09-27

## 2021-09-27 ENCOUNTER — LAB (OUTPATIENT)
Dept: LAB | Facility: CLINIC | Age: 66
End: 2021-09-27
Payer: COMMERCIAL

## 2021-09-27 ENCOUNTER — MEDICAL CORRESPONDENCE (OUTPATIENT)
Dept: HEALTH INFORMATION MANAGEMENT | Facility: CLINIC | Age: 66
End: 2021-09-27

## 2021-09-27 ENCOUNTER — LAB REQUISITION (OUTPATIENT)
Dept: LAB | Facility: CLINIC | Age: 66
End: 2021-09-27
Payer: COMMERCIAL

## 2021-09-27 ENCOUNTER — HOSPITAL ENCOUNTER (EMERGENCY)
Facility: CLINIC | Age: 66
Discharge: HOME | End: 2021-09-28
Attending: EMERGENCY MEDICINE | Admitting: EMERGENCY MEDICINE
Payer: COMMERCIAL

## 2021-09-27 DIAGNOSIS — E87.5 HYPERKALEMIA: ICD-10-CM

## 2021-09-27 DIAGNOSIS — Z94.1 S/P ORTHOTOPIC HEART TRANSPLANT (H): ICD-10-CM

## 2021-09-27 DIAGNOSIS — Z94.1 TRANSPLANTED HEART (H): Primary | ICD-10-CM

## 2021-09-27 DIAGNOSIS — Z79.899 OTHER LONG TERM (CURRENT) DRUG THERAPY: ICD-10-CM

## 2021-09-27 DIAGNOSIS — Z94.1 TRANSPLANTED HEART (H): ICD-10-CM

## 2021-09-27 DIAGNOSIS — Z48.21 ENCOUNTER FOR AFTERCARE FOLLOWING HEART TRANSPLANT (H): ICD-10-CM

## 2021-09-27 LAB
ALBUMIN UR-MCNC: 10 MG/DL
ANION GAP SERPL CALCULATED.3IONS-SCNC: 4 MMOL/L (ref 3–14)
ANION GAP SERPL CALCULATED.3IONS-SCNC: 4 MMOL/L (ref 3–14)
ANION GAP SERPL CALCULATED.3IONS-SCNC: 5 MMOL/L (ref 3–14)
ANION GAP SERPL CALCULATED.3IONS-SCNC: 5 MMOL/L (ref 3–14)
APPEARANCE UR: CLEAR
ATRIAL RATE - MUSE: 109 BPM
BACTERIA BRONCH: ABNORMAL
BACTERIA BRONCH: NO GROWTH
BASOPHILS # BLD AUTO: 0.1 10E3/UL (ref 0–0.2)
BASOPHILS # BLD AUTO: 0.1 10E3/UL (ref 0–0.2)
BASOPHILS NFR BLD AUTO: 1 %
BASOPHILS NFR BLD AUTO: 1 %
BILIRUB UR QL STRIP: NEGATIVE
BUN SERPL-MCNC: 35 MG/DL (ref 7–30)
BUN SERPL-MCNC: 37 MG/DL (ref 7–30)
BUN SERPL-MCNC: 37 MG/DL (ref 7–30)
BUN SERPL-MCNC: 38 MG/DL (ref 7–30)
CALCIUM SERPL-MCNC: 8.1 MG/DL (ref 8.5–10.1)
CALCIUM SERPL-MCNC: 8.2 MG/DL (ref 8.5–10.1)
CALCIUM SERPL-MCNC: 8.3 MG/DL (ref 8.5–10.1)
CALCIUM SERPL-MCNC: 8.6 MG/DL (ref 8.5–10.1)
CHLORIDE BLD-SCNC: 111 MMOL/L (ref 94–109)
CHLORIDE BLD-SCNC: 112 MMOL/L (ref 94–109)
CHLORIDE BLD-SCNC: 114 MMOL/L (ref 94–109)
CHLORIDE BLD-SCNC: 115 MMOL/L (ref 94–109)
CO2 SERPL-SCNC: 19 MMOL/L (ref 20–32)
CO2 SERPL-SCNC: 21 MMOL/L (ref 20–32)
CO2 SERPL-SCNC: 22 MMOL/L (ref 20–32)
CO2 SERPL-SCNC: 22 MMOL/L (ref 20–32)
COLOR UR AUTO: ABNORMAL
CREAT SERPL-MCNC: 1.82 MG/DL (ref 0.66–1.25)
CREAT SERPL-MCNC: 1.83 MG/DL (ref 0.66–1.25)
CREAT SERPL-MCNC: 1.94 MG/DL (ref 0.66–1.25)
CREAT SERPL-MCNC: 2.06 MG/DL (ref 0.66–1.25)
DIASTOLIC BLOOD PRESSURE - MUSE: NORMAL MMHG
EOSINOPHIL # BLD AUTO: 0.2 10E3/UL (ref 0–0.7)
EOSINOPHIL # BLD AUTO: 0.3 10E3/UL (ref 0–0.7)
EOSINOPHIL NFR BLD AUTO: 2 %
EOSINOPHIL NFR BLD AUTO: 3 %
ERYTHROCYTE [DISTWIDTH] IN BLOOD BY AUTOMATED COUNT: 20.8 % (ref 10–15)
ERYTHROCYTE [DISTWIDTH] IN BLOOD BY AUTOMATED COUNT: 20.9 % (ref 10–15)
GFR SERPL CREATININE-BSD FRML MDRD: 33 ML/MIN/1.73M2
GFR SERPL CREATININE-BSD FRML MDRD: 36 ML/MIN/1.73M2
GFR SERPL CREATININE-BSD FRML MDRD: 38 ML/MIN/1.73M2
GFR SERPL CREATININE-BSD FRML MDRD: 38 ML/MIN/1.73M2
GLUCOSE BLD-MCNC: 144 MG/DL (ref 70–99)
GLUCOSE BLD-MCNC: 186 MG/DL (ref 70–99)
GLUCOSE BLD-MCNC: 80 MG/DL (ref 70–99)
GLUCOSE BLD-MCNC: 98 MG/DL (ref 70–99)
GLUCOSE UR STRIP-MCNC: NEGATIVE MG/DL
HCT VFR BLD AUTO: 31.6 % (ref 40–53)
HCT VFR BLD AUTO: 32.8 % (ref 40–53)
HGB BLD-MCNC: 9.3 G/DL (ref 13.3–17.7)
HGB BLD-MCNC: 9.7 G/DL (ref 13.3–17.7)
HGB UR QL STRIP: NEGATIVE
HOLD SPECIMEN: NORMAL
HOLD SPECIMEN: NORMAL
IMM GRANULOCYTES # BLD: 0.1 10E3/UL
IMM GRANULOCYTES # BLD: 0.1 10E3/UL
IMM GRANULOCYTES NFR BLD: 1 %
IMM GRANULOCYTES NFR BLD: 1 %
INTERPRETATION ECG - MUSE: NORMAL
KETONES UR STRIP-MCNC: NEGATIVE MG/DL
LEUKOCYTE ESTERASE UR QL STRIP: NEGATIVE
LYMPHOCYTES # BLD AUTO: 1.5 10E3/UL (ref 0.8–5.3)
LYMPHOCYTES # BLD AUTO: 1.7 10E3/UL (ref 0.8–5.3)
LYMPHOCYTES NFR BLD AUTO: 20 %
LYMPHOCYTES NFR BLD AUTO: 22 %
MAGNESIUM SERPL-MCNC: 2 MG/DL (ref 1.6–2.3)
MCH RBC QN AUTO: 28.8 PG (ref 26.5–33)
MCH RBC QN AUTO: 29 PG (ref 26.5–33)
MCHC RBC AUTO-ENTMCNC: 29.4 G/DL (ref 31.5–36.5)
MCHC RBC AUTO-ENTMCNC: 29.6 G/DL (ref 31.5–36.5)
MCV RBC AUTO: 98 FL (ref 78–100)
MCV RBC AUTO: 98 FL (ref 78–100)
MONOCYTES # BLD AUTO: 0.8 10E3/UL (ref 0–1.3)
MONOCYTES # BLD AUTO: 0.8 10E3/UL (ref 0–1.3)
MONOCYTES NFR BLD AUTO: 10 %
MONOCYTES NFR BLD AUTO: 11 %
MUCOUS THREADS #/AREA URNS LPF: PRESENT /LPF
NEUTROPHILS # BLD AUTO: 4.7 10E3/UL (ref 1.6–8.3)
NEUTROPHILS # BLD AUTO: 4.8 10E3/UL (ref 1.6–8.3)
NEUTROPHILS NFR BLD AUTO: 63 %
NEUTROPHILS NFR BLD AUTO: 65 %
NITRATE UR QL: NEGATIVE
NRBC # BLD AUTO: 0 10E3/UL
NRBC # BLD AUTO: 0 10E3/UL
NRBC BLD AUTO-RTO: 0 /100
NRBC BLD AUTO-RTO: 0 /100
P AXIS - MUSE: 65 DEGREES
PH UR STRIP: 5.5 [PH] (ref 5–7)
PHOSPHATE SERPL-MCNC: 2.7 MG/DL (ref 2.5–4.5)
PLATELET # BLD AUTO: 238 10E3/UL (ref 150–450)
PLATELET # BLD AUTO: 239 10E3/UL (ref 150–450)
POTASSIUM BLD-SCNC: 5.8 MMOL/L (ref 3.4–5.3)
POTASSIUM BLD-SCNC: 6.2 MMOL/L (ref 3.4–5.3)
POTASSIUM BLD-SCNC: 6.3 MMOL/L (ref 3.4–5.3)
POTASSIUM BLD-SCNC: 6.6 MMOL/L (ref 3.4–5.3)
PR INTERVAL - MUSE: 158 MS
QRS DURATION - MUSE: 90 MS
QT - MUSE: 314 MS
QTC - MUSE: 422 MS
R AXIS - MUSE: 25 DEGREES
RBC # BLD AUTO: 3.23 10E6/UL (ref 4.4–5.9)
RBC # BLD AUTO: 3.35 10E6/UL (ref 4.4–5.9)
RBC URINE: 0 /HPF
SODIUM SERPL-SCNC: 137 MMOL/L (ref 133–144)
SODIUM SERPL-SCNC: 138 MMOL/L (ref 133–144)
SODIUM SERPL-SCNC: 139 MMOL/L (ref 133–144)
SODIUM SERPL-SCNC: 140 MMOL/L (ref 133–144)
SP GR UR STRIP: 1.01 (ref 1–1.03)
SQUAMOUS EPITHELIAL: <1 /HPF
SYSTOLIC BLOOD PRESSURE - MUSE: NORMAL MMHG
T AXIS - MUSE: 43 DEGREES
TACROLIMUS BLD-MCNC: 13.9 UG/L (ref 5–15)
TME LAST DOSE: NORMAL H
TME LAST DOSE: NORMAL H
TROPONIN I SERPL-MCNC: <0.015 UG/L (ref 0–0.04)
UROBILINOGEN UR STRIP-MCNC: NORMAL MG/DL
VENTRICULAR RATE- MUSE: 109 BPM
WBC # BLD AUTO: 7.4 10E3/UL (ref 4–11)
WBC # BLD AUTO: 7.5 10E3/UL (ref 4–11)
WBC URINE: <1 /HPF

## 2021-09-27 PROCEDURE — 36415 COLL VENOUS BLD VENIPUNCTURE: CPT | Performed by: EMERGENCY MEDICINE

## 2021-09-27 PROCEDURE — 80048 BASIC METABOLIC PNL TOTAL CA: CPT | Mod: 91 | Performed by: EMERGENCY MEDICINE

## 2021-09-27 PROCEDURE — 36415 COLL VENOUS BLD VENIPUNCTURE: CPT | Performed by: PATHOLOGY

## 2021-09-27 PROCEDURE — 81001 URINALYSIS AUTO W/SCOPE: CPT | Performed by: EMERGENCY MEDICINE

## 2021-09-27 PROCEDURE — 258N000003 HC RX IP 258 OP 636: Performed by: EMERGENCY MEDICINE

## 2021-09-27 PROCEDURE — 80048 BASIC METABOLIC PNL TOTAL CA: CPT | Performed by: INTERNAL MEDICINE

## 2021-09-27 PROCEDURE — 93005 ELECTROCARDIOGRAM TRACING: CPT | Performed by: EMERGENCY MEDICINE

## 2021-09-27 PROCEDURE — 85025 COMPLETE CBC W/AUTO DIFF WBC: CPT | Performed by: EMERGENCY MEDICINE

## 2021-09-27 PROCEDURE — 80048 BASIC METABOLIC PNL TOTAL CA: CPT | Performed by: PATHOLOGY

## 2021-09-27 PROCEDURE — 80197 ASSAY OF TACROLIMUS: CPT | Performed by: INTERNAL MEDICINE

## 2021-09-27 PROCEDURE — 99284 EMERGENCY DEPT VISIT MOD MDM: CPT | Mod: 25 | Performed by: EMERGENCY MEDICINE

## 2021-09-27 PROCEDURE — 84100 ASSAY OF PHOSPHORUS: CPT | Performed by: INTERNAL MEDICINE

## 2021-09-27 PROCEDURE — 99285 EMERGENCY DEPT VISIT HI MDM: CPT | Mod: 25 | Performed by: EMERGENCY MEDICINE

## 2021-09-27 PROCEDURE — 96361 HYDRATE IV INFUSION ADD-ON: CPT | Performed by: EMERGENCY MEDICINE

## 2021-09-27 PROCEDURE — 85025 COMPLETE CBC W/AUTO DIFF WBC: CPT | Performed by: INTERNAL MEDICINE

## 2021-09-27 PROCEDURE — 93010 ELECTROCARDIOGRAM REPORT: CPT | Performed by: EMERGENCY MEDICINE

## 2021-09-27 PROCEDURE — 250N000013 HC RX MED GY IP 250 OP 250 PS 637: Performed by: EMERGENCY MEDICINE

## 2021-09-27 PROCEDURE — 84484 ASSAY OF TROPONIN QUANT: CPT | Performed by: EMERGENCY MEDICINE

## 2021-09-27 PROCEDURE — 96374 THER/PROPH/DIAG INJ IV PUSH: CPT | Performed by: EMERGENCY MEDICINE

## 2021-09-27 PROCEDURE — 83735 ASSAY OF MAGNESIUM: CPT | Performed by: INTERNAL MEDICINE

## 2021-09-27 PROCEDURE — 250N000011 HC RX IP 250 OP 636: Performed by: EMERGENCY MEDICINE

## 2021-09-27 RX ORDER — FUROSEMIDE 10 MG/ML
40 INJECTION INTRAMUSCULAR; INTRAVENOUS ONCE
Status: COMPLETED | OUTPATIENT
Start: 2021-09-27 | End: 2021-09-27

## 2021-09-27 RX ADMIN — SODIUM CHLORIDE 1000 ML: 9 INJECTION, SOLUTION INTRAVENOUS at 20:13

## 2021-09-27 RX ADMIN — Medication 1 TABLET: at 22:09

## 2021-09-27 RX ADMIN — FUROSEMIDE 40 MG: 10 INJECTION, SOLUTION INTRAVENOUS at 22:09

## 2021-09-27 ASSESSMENT — ENCOUNTER SYMPTOMS
COUGH: 0
VOMITING: 0
BLOOD IN STOOL: 0
FLANK PAIN: 0
HEADACHES: 0
AGITATION: 0
CONFUSION: 0
EYE REDNESS: 0
SHORTNESS OF BREATH: 1
EYE DISCHARGE: 0
NECK STIFFNESS: 0
BACK PAIN: 0
DIARRHEA: 0
FEVER: 0
CHILLS: 0
DYSURIA: 0
NAUSEA: 0
ABDOMINAL PAIN: 0
SEIZURES: 0
PALPITATIONS: 0
NERVOUS/ANXIOUS: 0
MYALGIAS: 0
CHEST TIGHTNESS: 0
NUMBNESS: 0
FREQUENCY: 0
WEAKNESS: 0
SORE THROAT: 0
LIGHT-HEADEDNESS: 0

## 2021-09-27 ASSESSMENT — MIFFLIN-ST. JEOR: SCORE: 1623.16

## 2021-09-27 NOTE — TELEPHONE ENCOUNTER
Repeat potassium = 6.3. Dr. Montgomery aware and wants patient to be evaluated in ER. Instructions communicated to patient. Pt will present from clinic to ER. Report called to charge nurse, Michelle. No further questions at this time.

## 2021-09-27 NOTE — PROGRESS NOTES
Called with a critical potassium of 6.2. After reviewing with Dr. Montgomery repeat labs and EKG to be completed. STAT. Pt notified and will be coming into clinic for both of the above. Orders in and appt made.

## 2021-09-27 NOTE — ED TRIAGE NOTES
66 year old male with history of heart transplant, CKD, COPD, and DM II, presents for recheck of potassium level.  Patient was seen in clinic earlier today for EKG and blood draw, and was called on his way home, stating he needed to come to ED for abnormal labs.  Patient now notes left sided chest pain.

## 2021-09-28 ENCOUNTER — TELEPHONE (OUTPATIENT)
Dept: TRANSPLANT | Facility: CLINIC | Age: 66
End: 2021-09-28

## 2021-09-28 ENCOUNTER — DOCUMENTATION ONLY (OUTPATIENT)
Dept: TRANSPLANT | Facility: CLINIC | Age: 66
End: 2021-09-28

## 2021-09-28 VITALS
HEIGHT: 68 IN | BODY MASS INDEX: 28.69 KG/M2 | HEART RATE: 109 BPM | WEIGHT: 189.3 LBS | DIASTOLIC BLOOD PRESSURE: 96 MMHG | SYSTOLIC BLOOD PRESSURE: 142 MMHG | OXYGEN SATURATION: 98 % | TEMPERATURE: 98.2 F | RESPIRATION RATE: 16 BRPM

## 2021-09-28 DIAGNOSIS — J44.9 CHRONIC OBSTRUCTIVE PULMONARY DISEASE, UNSPECIFIED COPD TYPE (H): ICD-10-CM

## 2021-09-28 DIAGNOSIS — Z94.1 S/P ORTHOTOPIC HEART TRANSPLANT (H): ICD-10-CM

## 2021-09-28 LAB
ATRIAL RATE - MUSE: 114 BPM
DIASTOLIC BLOOD PRESSURE - MUSE: NORMAL MMHG
INTERPRETATION ECG - MUSE: NORMAL
P AXIS - MUSE: 27 DEGREES
PR INTERVAL - MUSE: 164 MS
QRS DURATION - MUSE: 88 MS
QT - MUSE: 306 MS
QTC - MUSE: 421 MS
R AXIS - MUSE: 47 DEGREES
SYSTOLIC BLOOD PRESSURE - MUSE: NORMAL MMHG
T AXIS - MUSE: 42 DEGREES
VENTRICULAR RATE- MUSE: 114 BPM

## 2021-09-28 PROCEDURE — 96361 HYDRATE IV INFUSION ADD-ON: CPT | Performed by: EMERGENCY MEDICINE

## 2021-09-28 PROCEDURE — 258N000003 HC RX IP 258 OP 636: Performed by: EMERGENCY MEDICINE

## 2021-09-28 RX ORDER — BUPROPION HYDROCHLORIDE 75 MG/1
75 TABLET ORAL 2 TIMES DAILY
Qty: 60 TABLET | Refills: 1 | Status: SHIPPED | OUTPATIENT
Start: 2021-09-28 | End: 2021-11-18

## 2021-09-28 RX ORDER — TRAMADOL HYDROCHLORIDE 50 MG/1
25-50 TABLET ORAL EVERY 6 HOURS PRN
Qty: 25 TABLET | Refills: 1 | Status: SHIPPED | OUTPATIENT
Start: 2021-09-28 | End: 2021-11-18

## 2021-09-28 RX ORDER — GABAPENTIN 300 MG/1
300 CAPSULE ORAL 2 TIMES DAILY
Qty: 60 CAPSULE | Refills: 1 | Status: SHIPPED | OUTPATIENT
Start: 2021-09-28 | End: 2021-10-05

## 2021-09-28 RX ORDER — ALBUTEROL SULFATE 90 UG/1
INHALANT RESPIRATORY (INHALATION)
Qty: 18 G | Refills: 3 | Status: SHIPPED | OUTPATIENT
Start: 2021-09-28 | End: 2021-11-18

## 2021-09-28 RX ORDER — MONTELUKAST SODIUM 10 MG/1
10 TABLET ORAL AT BEDTIME
Qty: 90 TABLET | Refills: 0 | Status: SHIPPED | OUTPATIENT
Start: 2021-09-28 | End: 2021-11-18

## 2021-09-28 RX ADMIN — SODIUM CHLORIDE 500 ML: 9 INJECTION, SOLUTION INTRAVENOUS at 00:14

## 2021-09-28 NOTE — TELEPHONE ENCOUNTER
Pt called. He was in ER until 1 am. They gave IV fluids. Pt hadn't been taking calcium citrate so they believe this is why his potassium was so high. Prescription refilled. Pt to have repeat bmp and tacro on Thursday with home health RN.     Pt tacro level was 13. Pt thinks this was a good 12.5 hour trough. Level prior was wnl. Will repeat on Thursday and adjust accordingly.     Pt states they are doing cobra for 1 month. This will allow him to stay with same pharmacy for now.

## 2021-09-28 NOTE — ED PROVIDER NOTES
[place] EMERGENCY DEPARTMENT ([facility])  9/27/21 ED 29   History     Chief Complaint   Patient presents with     Abnormal Labs     The history is provided by the patient and medical records.     Jim Willingham is a 66 year old male with history of orthotopic heart transplant on 5/6/2021 who presents for further evaluation of elevated potassium.  Patient had outpatient labs done today showing elevated potassium of 6.2.  This was rechecked and was found again to be high at 6.3.  Dr. Montgomery reviewed these findings and instructed patient to come to the emergency department for further evaluation.  Patient has no fever or chills.  He has been eating well without nausea or vomiting.  He reports some vague chest discomfort and shortness of breath which she has noted since his surgery.  Nothing seems to be different.  He has no known exposures.  He has no dysuria or urinary frequency.  He has no melena or bright red blood per rectum.    Patient reports that he ran out of his calcium citrate pills last week and has had difficulty in refilling the prescription.  He has been on an antifungal medication for his lungs for the past month.    Past Medical History  Past Medical History:   Diagnosis Date     Bariatric surgery status 2003     Benign essential hypertension 05/11/2017     Bilateral carpal tunnel syndrome 11/02/2020     Cardiomyopathy, unspecified (H) 05/08/2017     CKD (chronic kidney disease) stage 3, GFR 30-59 ml/min 05/11/2017     COPD (chronic obstructive pulmonary disease) (H) 11/02/2020     Depression 05/11/2017     Diabetes mellitus (H) 1995     Gouty arthropathy, chronic, without tophi 11/02/2020     H/O gastric bypass 05/11/2017     ICD (implantable cardioverter-defibrillator), biventricular, in situ 05/11/2017     LVAD (left ventricular assist device) present (H)      Major depression, recurrent, chronic (H) 11/02/2020     NICM (nonischemic cardiomyopathy) (H)/ EF 20% 05/11/2017    ECHO:  LVEDd. 7.66 cm, Restrictive pattern , Severe mitral valve regurgitation     CECILIA (obstructive sleep apnea) 05/11/2017     Paroxysmal atrial fibrillation (H) 05/11/2017     Paroxysmal VT (H) 05/11/2017     Rotator cuff tear arthropathy of both shoulders 11/02/2020     Uncomplicated asthma      Vitamin B12 deficiency (non anemic) 05/11/2017     Past Surgical History:   Procedure Laterality Date     ANESTHESIA CARDIOVERSION N/A 05/11/2020    Procedure: ANESTHESIA, FOR CARDIOVERSION @1100;  Surgeon: GENERIC ANESTHESIA PROVIDER;  Location: [facility] OR     BRONCHOSCOPY (RIGID OR FLEXIBLE), DIAGNOSTIC N/A 8/30/2021    Procedure: BRONCHOSCOPY, WITH BRONCHOALVEOLAR LAVAGE;  Surgeon: Perlman, David Morris, MD;  Location:  GI     CV HEART BIOPSY N/A 5/13/2021    Procedure: Heart Biopsy;  Surgeon: Scout Robins MD;  Location:  HEART CARDIAC CATH LAB     CV HEART BIOPSY N/A 5/20/2021    Procedure: Heart Biopsy;  Surgeon: Jeffrey Gibson MD;  Location:  HEART CARDIAC CATH LAB     CV HEART BIOPSY N/A 5/27/2021    Procedure: Heart Biopsy;  Surgeon: Jac Dover MD;  Location:  HEART CARDIAC CATH LAB     CV HEART BIOPSY N/A 6/7/2021    Procedure: CV HEART BIOPSY;  Surgeon: Jeffrey Gibson MD;  Location:  HEART CARDIAC CATH LAB     CV HEART BIOPSY N/A 6/21/2021    Procedure: CV HEART BIOPSY;  Surgeon: Scout Robins MD;  Location:  HEART CARDIAC CATH LAB     CV HEART BIOPSY N/A 7/5/2021    Procedure: CV HEART BIOPSY;  Surgeon: Jeffrey Gibson MD;  [facility]: [facility] CARD[facility] C[facility] LAB     CV HEART BIOPSY N/A 7/16/2021    Procedure: Heart Biopsy;  Surgeon: Amadeo Art MD;  Location:  HEART CARDIAC CATH LAB     CV HEART BIOPSY N/A 8/5/2021    Procedure: Heart Biopsy;  Surgeon: Jeffrey Gibson MD;  Location:  HEART CARDIAC CATH LAB     CV HEART BIOPSY N/A 8/31/2021    Procedure: Heart Cath Heart Biopsy;  Surgeon: Jeffrey Gibson  MD Sue;  Location:  HEART CARDIAC CATH LAB     CV HEART BIOPSY N/A 9/21/2021    Procedure: CV HEART BIOPSY;  Surgeon: Jeffrey Gibson MD;  Location:  HEART CARDIAC CATH LAB     CV RIGHT HEART CATH MEASUREMENTS RECORDED N/A 07/24/2019    Procedure: CV RIGHT HEART CATH;  Surgeon: Renu Sears MD;  Location:  HEART CARDIAC CATH LAB     CV RIGHT HEART CATH MEASUREMENTS RECORDED N/A 08/05/2020    Procedure: CV RIGHT HEART CATH;  Surgeon: Nicola Seth MD;  Location:  HEART CARDIAC CATH LAB     CV RIGHT HEART CATH MEASUREMENTS RECORDED N/A 01/07/2021    Procedure: CV RIGHT HEART CATH;  Surgeon: Jac Dover MD;  Location:  HEART CARDIAC CATH LAB     CV RIGHT HEART CATH MEASUREMENTS RECORDED N/A 02/23/2021    Procedure: Heart Cath Right Heart Cath;  Surgeon: Jeffrey Gibson MD;  Location:  HEART CARDIAC CATH LAB     CV RIGHT HEART CATH MEASUREMENTS RECORDED N/A 03/23/2021    Procedure: Heart Cath Right Heart Cath. request for 3/23;  Surgeon: Jeffrey Gibson MD;  Location:  HEART CARDIAC CATH LAB     CV RIGHT HEART CATH MEASUREMENTS RECORDED N/A 5/13/2021    Procedure: Right Heart Cath;  Surgeon: Scout Robins MD;  Location:  HEART CARDIAC CATH LAB     CV RIGHT HEART CATH MEASUREMENTS RECORDED N/A 5/20/2021    Procedure: Right Heart Cath;  Surgeon: Jeffrey Gibson MD;  Location:  HEART CARDIAC CATH LAB     CV RIGHT HEART CATH MEASUREMENTS RECORDED N/A 5/27/2021    Procedure: Right Heart Cath;  Surgeon: Jac Dover MD;  Location:  HEART CARDIAC CATH LAB     CV RIGHT HEART CATH MEASUREMENTS RECORDED N/A 6/7/2021    Procedure: CV RIGHT HEART CATH;  Surgeon: Jeffrey Gibson MD;  Location:  HEART CARDIAC CATH LAB     CV RIGHT HEART CATH MEASUREMENTS RECORDED N/A 6/21/2021    Procedure: CV RIGHT HEART CATH;  Surgeon: Scout Robins MD;  Location:  HEART CARDIAC CATH LAB     CV RIGHT  HEART CATH MEASUREMENTS RECORDED N/A 7/5/2021    Procedure: CV RIGHT HEART CATH;  Surgeon: Jeffrey Gibson MD;  Location:  HEART CARDIAC CATH LAB     CV RIGHT HEART CATH MEASUREMENTS RECORDED N/A 7/16/2021    Procedure: Right Heart Cath;  Surgeon: Amadeo Art MD;  Location:  HEART CARDIAC CATH LAB     CV RIGHT HEART CATH MEASUREMENTS RECORDED N/A 8/5/2021    Procedure: CV RIGHT HEART CATH;  Surgeon: Jeffrey Gibson MD;  Location:  HEART CARDIAC CATH LAB     CV RIGHT HEART CATH MEASUREMENTS RECORDED N/A 8/31/2021    Procedure: Heart Cath Right Heart Cath;  Surgeon: Jeffrey Gibson MD;  Location:  HEART CARDIAC CATH LAB     CV RIGHT HEART CATH MEASUREMENTS RECORDED N/A 9/21/2021    Procedure: CV RIGHT HEART CATH;  Surgeon: Jeffrey Gibson MD;  Location:  HEART CARDIAC CATH LAB     GI SURGERY  2003    [facility]     INSERT VENTRICULAR ASSIST DEVICE LEFT (HEARTMATE II) N/A 06/19/2017    Procedure: INSERT VENTRICULAR ASSIST DEVICE LEFT (HEARTMATE II);  Median Sternotomy Heartmate II Left Ventricular Assist Device Insertion on Pump Oxygenator;  Surgeon: Ronnie Quigley MD;  Location:  OR     IR CHEST TUBE PLACEMENT NON-TUNNELED RIGHT  7/11/2021     ORTHOPEDIC SURGERY  1994    right knee wired     PICC DOUBLE LUMEN PLACEMENT Right 09/23/2020    5FR PICC DL. Length-43cm (1cm out).     PICC INSERTION - DOUBLE LUMEN Right 05/09/2021    IK/BRACH     RELEASE CARPAL TUNNEL BILATERAL Bilateral 02/18/2021    Procedure: Bilateral carpal tunnel release;  Surgeon: Jermaine Brand MD;  Location:  OR     TRANSPLANT HEART RECIPIENT N/A 05/05/2021    Procedure: Redo median sternotomy, lysis of adhesions, heart transplant recipient, on cardiopulmonary bypass, intraoperative transesophageal echocardiogram per anesthesia, Implantable Cardioverter Defibrillator (ICD) removal;  Surgeon: Ronnie Quigley MD;  Location:  OR     acetaminophen (TYLENOL) 325 MG  tablet  allopurinol (ZYLOPRIM) 300 MG tablet  amitriptyline (ELAVIL) 25 MG tablet  anakinra (KINERET) 100 MG/0.67ML SOSY injection  aspirin (ASA) 81 MG chewable tablet  blood glucose (NO BRAND SPECIFIED) test strip  blood glucose (ONE TOUCH DELICA) lancing device  Continuous Blood Gluc Transmit (DEXCOM G6 TRANSMITTER) MISC  ferrous sulfate (FEROSUL) 325 (65 Fe) MG tablet  Fluticasone-Umeclidin-Vilanterol (TRELEGY ELLIPTA) 100-62.5-25 MCG/INH oral inhaler  gabapentin (NEURONTIN) 300 MG capsule  insulin pen needle (32G X 4 MM) 32G X 4 MM miscellaneous  Melatonin 10 MG CAPS  mycophenolate (GENERIC EQUIVALENT) 250 MG capsule  pantoprazole (PROTONIX) 40 MG EC tablet  tacrolimus (GENERIC EQUIVALENT) 1 MG capsule  voriconazole (VFEND) 200 MG tablet  albuterol (VENTOLIN HFA) 108 (90 Base) MCG/ACT inhaler  anakinra (KINERET) 100 MG/0.67ML SOSY injection  blood glucose monitoring (ONE TOUCH ULTRA 2) meter device kit  buPROPion (WELLBUTRIN) 75 MG tablet  calcium citrate-vitamin D (CITRACAL) 315-250 MG-UNIT TABS per tablet  gabapentin (NEURONTIN) 300 MG capsule  montelukast (SINGULAIR) 10 MG tablet  traMADol (ULTRAM) 50 MG tablet      Allergies   Allergen Reactions     Grass Shortness Of Breath     Ace Inhibitors Cough     Cats      Dust Mites Other (See Comments)     Asthma     Mold Other (See Comments)     Asthma     Penicillins Other (See Comments)     Unknown - childhood exposure    Tolerated Zosyn 9/18-9/20/2020    Per patient report he has tolerated amoxicillin     Sulfa Drugs Other (See Comments) and Unknown     Unknown childhood reaction     Family History  Family History   Problem Relation Age of Onset     Cerebrovascular Disease Mother 64     Diabetes Mother      Hypertension Mother      Coronary Artery Disease Father      Diabetes Type 2  Father      Obesity Brother      Obesity Brother      Cerebrovascular Disease Daughter 40     Social History   Social History     Tobacco Use     Smoking status: Former Smoker      "Quit date:      Years since quittin.7     Smokeless tobacco: Never Used   Substance Use Topics     Alcohol use: No     Drug use: No      Past medical history, past surgical history, medications, allergies, family history, and social history were reviewed with the patient. No additional pertinent items.       Review of Systems   Constitutional: Negative for chills and fever.   HENT: Negative for sore throat.    Eyes: Negative for discharge and redness.   Respiratory: Positive for shortness of breath (Unchanged). Negative for cough and chest tightness.    Cardiovascular: Positive for chest pain (unchanged). Negative for palpitations and leg swelling.   Gastrointestinal: Negative for abdominal pain, blood in stool, diarrhea, nausea and vomiting.   Genitourinary: Negative for dysuria, flank pain and frequency.   Musculoskeletal: Negative for back pain, myalgias and neck stiffness.   Skin: Negative for pallor.   Neurological: Negative for seizures, weakness, light-headedness, numbness and headaches.   Psychiatric/Behavioral: Negative for agitation and confusion. The patient is not nervous/anxious.        Physical Exam   BP: (!) 146/99  Pulse: 115  Temp: 98.2  F (36.8  C)  Resp: 16  Height: 172.7 cm (5' 8\")  Weight: 85.9 kg (189 lb 4.8 oz)  SpO2: 99 %  Physical Exam  Vitals and nursing note reviewed.   Constitutional:       General: He is not in acute distress.     Appearance: He is well-developed. He is not diaphoretic.   HENT:      Head: Normocephalic and atraumatic.   Eyes:      General: No scleral icterus.     Pupils: Pupils are equal, round, and reactive to light.   Cardiovascular:      Rate and Rhythm: Normal rate and regular rhythm.      Heart sounds: Normal heart sounds. No murmur heard.     Pulmonary:      Effort: No respiratory distress.      Breath sounds: No stridor. No wheezing or rales.   Abdominal:      General: Bowel sounds are normal.      Palpations: Abdomen is soft.      Tenderness: There is no " abdominal tenderness.   Musculoskeletal:         General: No tenderness.      Cervical back: Normal range of motion and neck supple.   Skin:     General: Skin is warm and dry.      Coloration: Skin is not pale.      Findings: No erythema or rash.   Neurological:      Mental Status: He is alert and oriented to person, place, and time.      Sensory: No sensory deficit.      Coordination: Coordination normal.         ED Course      Procedures       EKG was performed.  It was read by me at 1909 p.m.  It showed a normal sinus rhythm with a rate of 114.  It is a low voltage QRS.  There are no acute abnormalities.    Patient reports to me that his pulse rate tends to run in the low 100s.       Results for orders placed or performed during the hospital encounter of 09/27/21   Basic metabolic panel     Status: Abnormal   Result Value Ref Range    Sodium 137 133 - 144 mmol/L    Potassium 6.6 (HH) 3.4 - 5.3 mmol/L    Chloride 112 (H) 94 - 109 mmol/L    Carbon Dioxide (CO2) 21 20 - 32 mmol/L    Anion Gap 4 3 - 14 mmol/L    Urea Nitrogen 37 (H) 7 - 30 mg/dL    Creatinine 1.83 (H) 0.66 - 1.25 mg/dL    Calcium 8.2 (L) 8.5 - 10.1 mg/dL    Glucose 80 70 - 99 mg/dL    GFR Estimate 38 (L) >60 mL/min/1.73m2   Troponin I     Status: Normal   Result Value Ref Range    Troponin I <0.015 0.000 - 0.045 ug/L   CBC with platelets and differential     Status: Abnormal   Result Value Ref Range    WBC Count 7.4 4.0 - 11.0 10e3/uL    RBC Count 3.23 (L) 4.40 - 5.90 10e6/uL    Hemoglobin 9.3 (L) 13.3 - 17.7 g/dL    Hematocrit 31.6 (L) 40.0 - 53.0 %    MCV 98 78 - 100 fL    MCH 28.8 26.5 - 33.0 pg    MCHC 29.4 (L) 31.5 - 36.5 g/dL    RDW 20.8 (H) 10.0 - 15.0 %    Platelet Count 239 150 - 450 10e3/uL    % Neutrophils 63 %    % Lymphocytes 22 %    % Monocytes 11 %    % Eosinophils 2 %    % Basophils 1 %    % Immature Granulocytes 1 %    NRBCs per 100 WBC 0 <1 /100    Absolute Neutrophils 4.7 1.6 - 8.3 10e3/uL    Absolute Lymphocytes 1.7 0.8 - 5.3  10e3/uL    Absolute Monocytes 0.8 0.0 - 1.3 10e3/uL    Absolute Eosinophils 0.2 0.0 - 0.7 10e3/uL    Absolute Basophils 0.1 0.0 - 0.2 10e3/uL    Absolute Immature Granulocytes 0.1 (H) <=0.0 10e3/uL    Absolute NRBCs 0.0 10e3/uL   Basic metabolic panel     Status: Abnormal   Result Value Ref Range    Sodium 140 133 - 144 mmol/L    Potassium 6.3 (HH) 3.4 - 5.3 mmol/L    Chloride 114 (H) 94 - 109 mmol/L    Carbon Dioxide (CO2) 22 20 - 32 mmol/L    Anion Gap 4 3 - 14 mmol/L    Urea Nitrogen 38 (H) 7 - 30 mg/dL    Creatinine 1.94 (H) 0.66 - 1.25 mg/dL    Calcium 8.1 (L) 8.5 - 10.1 mg/dL    Glucose 144 (H) 70 - 99 mg/dL    GFR Estimate 36 (L) >60 mL/min/1.73m2   Results for orders placed or performed in visit on 09/27/21   Basic metabolic panel     Status: Abnormal   Result Value Ref Range    Sodium 139 133 - 144 mmol/L    Potassium 6.2 (HH) 3.4 - 5.3 mmol/L    Chloride 115 (H) 94 - 109 mmol/L    Carbon Dioxide (CO2) 19 (L) 20 - 32 mmol/L    Anion Gap 5 3 - 14 mmol/L    Urea Nitrogen 37 (H) 7 - 30 mg/dL    Creatinine 2.06 (H) 0.66 - 1.25 mg/dL    Calcium 8.3 (L) 8.5 - 10.1 mg/dL    Glucose 186 (H) 70 - 99 mg/dL    GFR Estimate 33 (L) >60 mL/min/1.73m2   Magnesium     Status: Normal   Result Value Ref Range    Magnesium 2.0 1.6 - 2.3 mg/dL   Phosphorus     Status: Normal   Result Value Ref Range    Phosphorus 2.7 2.5 - 4.5 mg/dL   CBC with platelets and differential     Status: Abnormal   Result Value Ref Range    WBC Count 7.5 4.0 - 11.0 10e3/uL    RBC Count 3.35 (L) 4.40 - 5.90 10e6/uL    Hemoglobin 9.7 (L) 13.3 - 17.7 g/dL    Hematocrit 32.8 (L) 40.0 - 53.0 %    MCV 98 78 - 100 fL    MCH 29.0 26.5 - 33.0 pg    MCHC 29.6 (L) 31.5 - 36.5 g/dL    RDW 20.9 (H) 10.0 - 15.0 %    Platelet Count 238 150 - 450 10e3/uL    % Neutrophils 65 %    % Lymphocytes 20 %    % Monocytes 10 %    % Eosinophils 3 %    % Basophils 1 %    % Immature Granulocytes 1 %    NRBCs per 100 WBC 0 <1 /100    Absolute Neutrophils 4.8 1.6 - 8.3 10e3/uL     Absolute Lymphocytes 1.5 0.8 - 5.3 10e3/uL    Absolute Monocytes 0.8 0.0 - 1.3 10e3/uL    Absolute Eosinophils 0.3 0.0 - 0.7 10e3/uL    Absolute Basophils 0.1 0.0 - 0.2 10e3/uL    Absolute Immature Granulocytes 0.1 (H) <=0.0 10e3/uL    Absolute NRBCs 0.0 10e3/uL     Medications   0.9% sodium chloride BOLUS (0 mLs Intravenous Stopped 9/27/21 2131)   furosemide (LASIX) injection 40 mg (40 mg Intravenous Given 9/27/21 2209)   0.9% sodium chloride BOLUS (0 mLs Intravenous Stopped 9/28/21 0055)        Assessments & Plan (with Medical Decision Making)   Patient is a 66-year-old male with a history of heart transplant last May who presents for evaluation of hyperkalemia.  Patient reports some chest discomfort and shortness of breath since his surgery which he reports is not getting worse.  He notes no urine complaints.  He has no headache.    On arrival, patient's blood pressure was 146/99 with a pulse rate of 115 and temperature 98.2.  His respirations are 16 and O2 saturation 99%.  His heart rate is slightly above his baseline.    EKG did not show any evidence of hyperkalemia.    Potassium was repeated in the emergency department and was 6.6.  His creatinine was 1.83 with a BUN of 37 which was suggestive of a prerenal condition.  Patient was given an initial liter of fluids with a dose of Lasix.  His potassium improved to 5.8.  His creatinine was 1.82 with a BUN of 35.  He was given an additional 500 cc of fluid, and on lung auscultation there is no evidence of volume retention.    Patient's troponin was less than 0.015.  Urinalysis showed a small amount of protein, and was otherwise unremarkable.    Patient likely has a mild degree of dehydration.  He has been off calcium which may be contributing to his elevated potassiums.  With fluids and Lasix, his potassium is trending in the right direction.  He was instructed on a low potassium diet.  Patient will follow-up later this week with a repeat basic metabolic profile to  make sure that his potassium stay near normal.    I have reviewed the nursing notes. I have reviewed the findings, diagnosis, plan and need for follow up with the patient.    Discharge Medication List as of 9/28/2021 12:56 AM          Final diagnoses:   Hyperkalemia       --    [facility] EMERGENCY DEPARTMENT  9/27/2021     Nba Dumont MD  09/28/21 2019

## 2021-09-28 NOTE — TELEPHONE ENCOUNTER
Routing refill request to provider for review/approval because:  Drug not on the [facility] refill protocol   No flowsheet data found.    Next 5 appointments (look out 90 days)    Oct 07, 2021  8:30 AM  (Arrive by 8:15 AM)  Return Visit with Elbert Pettit MD  [facility] ([facility] ) [address] [place] [address] [phone]   Oct 26, 2021  4:00 PM  Office Visit with Ricardo Gómez MD  [facility] ([facility] ) [address] [place] [address] [phone]        Zoey Mcmahon RN

## 2021-09-28 NOTE — TELEPHONE ENCOUNTER
Reason for call:  Medication   If this is a refill request, has the caller requested the refill from the pharmacy already? Yes  Will the patient be using a [facility]? No  Name of the pharmacy and phone number for the current request: [facility]    Name of the medication requested: traMADol (ULTRAM) 50 MG tablet,VENTOLIN  (90 Base) MCG/ACT inhaler,buPROPion (WELLBUTRIN) 75 MG tablet,montelukast (SINGULAIR) 10 MG tablet,gabapentin (NEURONTIN) 300 MG capsule,calcium citrate-vitamin D (CITRACAL) 315-250 MG-UNIT TABS per tablet    Other request: patient has other medications that need refills please call patient    Phone number to reach patient:  Cell number on file:    Telephone Information:   Mobile [phone]       Best Time:  Anytime     Can we leave a detailed message on this number?  YES    Travel screening: Not Applicable

## 2021-09-28 NOTE — PROGRESS NOTES
Home health contacted at [phone] ([facility]).     To discuss labs on Thursday 9/30. Tacro, bmp, and cbc needed.

## 2021-09-28 NOTE — DISCHARGE INSTRUCTIONS
Low potassium diet  Push fluids  Follow-up in 2 to 3 days with primary care and recheck potassium  Restart your calcium

## 2021-09-29 DIAGNOSIS — Z94.1 TRANSPLANTED HEART (H): ICD-10-CM

## 2021-09-29 LAB
ATRIAL RATE - MUSE: 109 BPM
DIASTOLIC BLOOD PRESSURE - MUSE: NORMAL MMHG
INTERPRETATION ECG - MUSE: NORMAL
P AXIS - MUSE: 71 DEGREES
PR INTERVAL - MUSE: 166 MS
QRS DURATION - MUSE: 90 MS
QT - MUSE: 334 MS
QTC - MUSE: 449 MS
R AXIS - MUSE: 63 DEGREES
SYSTOLIC BLOOD PRESSURE - MUSE: NORMAL MMHG
T AXIS - MUSE: 49 DEGREES
VENTRICULAR RATE- MUSE: 109 BPM

## 2021-09-30 ENCOUNTER — TELEPHONE (OUTPATIENT)
Dept: TRANSPLANT | Facility: CLINIC | Age: 66
End: 2021-09-30

## 2021-09-30 ENCOUNTER — LAB REQUISITION (OUTPATIENT)
Dept: LAB | Facility: CLINIC | Age: 66
End: 2021-09-30
Payer: COMMERCIAL

## 2021-09-30 DIAGNOSIS — Z48.21 ENCOUNTER FOR AFTERCARE FOLLOWING HEART TRANSPLANT (H): ICD-10-CM

## 2021-09-30 LAB
ANION GAP SERPL CALCULATED.3IONS-SCNC: 2 MMOL/L (ref 3–14)
BUN SERPL-MCNC: 29 MG/DL (ref 7–30)
CALCIUM SERPL-MCNC: 8.7 MG/DL (ref 8.5–10.1)
CHLORIDE BLD-SCNC: 111 MMOL/L (ref 94–109)
CO2 SERPL-SCNC: 23 MMOL/L (ref 20–32)
CREAT SERPL-MCNC: 1.83 MG/DL (ref 0.66–1.25)
ERYTHROCYTE [DISTWIDTH] IN BLOOD BY AUTOMATED COUNT: 19.7 % (ref 10–15)
GFR SERPL CREATININE-BSD FRML MDRD: 38 ML/MIN/1.73M2
GLUCOSE BLD-MCNC: 100 MG/DL (ref 70–99)
HCT VFR BLD AUTO: 32.1 % (ref 40–53)
HGB BLD-MCNC: 9.6 G/DL (ref 13.3–17.7)
MCH RBC QN AUTO: 29.4 PG (ref 26.5–33)
MCHC RBC AUTO-ENTMCNC: 29.9 G/DL (ref 31.5–36.5)
MCV RBC AUTO: 99 FL (ref 78–100)
PLATELET # BLD AUTO: 240 10E3/UL (ref 150–450)
POTASSIUM BLD-SCNC: 5.9 MMOL/L (ref 3.4–5.3)
RBC # BLD AUTO: 3.26 10E6/UL (ref 4.4–5.9)
SODIUM SERPL-SCNC: 136 MMOL/L (ref 133–144)
TACROLIMUS BLD-MCNC: 10.6 UG/L (ref 5–15)
TME LAST DOSE: NORMAL H
TME LAST DOSE: NORMAL H
WBC # BLD AUTO: 6.2 10E3/UL (ref 4–11)

## 2021-09-30 PROCEDURE — 80197 ASSAY OF TACROLIMUS: CPT | Performed by: INTERNAL MEDICINE

## 2021-09-30 PROCEDURE — 85027 COMPLETE CBC AUTOMATED: CPT | Performed by: INTERNAL MEDICINE

## 2021-09-30 PROCEDURE — 80048 BASIC METABOLIC PNL TOTAL CA: CPT | Performed by: INTERNAL MEDICINE

## 2021-09-30 NOTE — TELEPHONE ENCOUNTER
Provider Call: Home Care  [facility] # [phone]  Wanted to discuss home visits/order    Callback needed? Yes    Return Call Needed  Same as documented in contacts section  When to return call?: Greater than one day: Route standard priority

## 2021-10-01 ENCOUNTER — TELEPHONE (OUTPATIENT)
Dept: TRANSPLANT | Facility: CLINIC | Age: 66
End: 2021-10-01

## 2021-10-01 DIAGNOSIS — Z94.1 S/P ORTHOTOPIC HEART TRANSPLANT (H): ICD-10-CM

## 2021-10-01 DIAGNOSIS — Z94.1 TRANSPLANTED HEART (H): Primary | ICD-10-CM

## 2021-10-01 RX ORDER — LIDOCAINE 40 MG/G
CREAM TOPICAL
Status: CANCELLED | OUTPATIENT
Start: 2021-10-01

## 2021-10-01 RX ORDER — TACROLIMUS 1 MG/1
CAPSULE ORAL
Qty: 900 CAPSULE | Refills: 11 | COMMUNITY
Start: 2021-10-01 | End: 2021-10-14

## 2021-10-04 ENCOUNTER — TELEPHONE (OUTPATIENT)
Dept: CARDIOLOGY | Facility: CLINIC | Age: 66
End: 2021-10-04

## 2021-10-04 NOTE — TELEPHONE ENCOUNTER
Call complete for pre procedure reminder and updated visitor policy.  Per heart transplant criteria, Covid test not done.

## 2021-10-05 ENCOUNTER — HOSPITAL ENCOUNTER (OUTPATIENT)
Facility: CLINIC | Age: 66
Discharge: HOME | End: 2021-10-05
Attending: INTERNAL MEDICINE | Admitting: INTERNAL MEDICINE
Payer: COMMERCIAL

## 2021-10-05 ENCOUNTER — OFFICE VISIT (OUTPATIENT)
Dept: ORTHOPEDICS | Facility: CLINIC | Age: 66
End: 2021-10-05
Payer: COMMERCIAL

## 2021-10-05 ENCOUNTER — OFFICE VISIT (OUTPATIENT)
Dept: CARDIOLOGY | Facility: CLINIC | Age: 66
End: 2021-10-05
Attending: INTERNAL MEDICINE
Payer: COMMERCIAL

## 2021-10-05 ENCOUNTER — APPOINTMENT (OUTPATIENT)
Dept: MEDSURG UNIT | Facility: CLINIC | Age: 66
End: 2021-10-05
Attending: INTERNAL MEDICINE
Payer: COMMERCIAL

## 2021-10-05 ENCOUNTER — LAB (OUTPATIENT)
Dept: LAB | Facility: CLINIC | Age: 66
End: 2021-10-05
Payer: COMMERCIAL

## 2021-10-05 ENCOUNTER — ANCILLARY PROCEDURE (OUTPATIENT)
Dept: GENERAL RADIOLOGY | Facility: CLINIC | Age: 66
End: 2021-10-05
Attending: FAMILY MEDICINE
Payer: COMMERCIAL

## 2021-10-05 ENCOUNTER — TELEPHONE (OUTPATIENT)
Dept: FAMILY MEDICINE | Facility: CLINIC | Age: 66
End: 2021-10-05

## 2021-10-05 VITALS
DIASTOLIC BLOOD PRESSURE: 79 MMHG | BODY MASS INDEX: 28.34 KG/M2 | HEART RATE: 107 BPM | OXYGEN SATURATION: 98 % | WEIGHT: 187 LBS | SYSTOLIC BLOOD PRESSURE: 113 MMHG | HEIGHT: 68 IN

## 2021-10-05 VITALS
RESPIRATION RATE: 16 BRPM | DIASTOLIC BLOOD PRESSURE: 88 MMHG | OXYGEN SATURATION: 99 % | HEART RATE: 102 BPM | SYSTOLIC BLOOD PRESSURE: 118 MMHG

## 2021-10-05 VITALS — WEIGHT: 187 LBS | BODY MASS INDEX: 28.34 KG/M2 | HEIGHT: 68 IN

## 2021-10-05 DIAGNOSIS — M51.369 LUMBAR DEGENERATIVE DISC DISEASE: ICD-10-CM

## 2021-10-05 DIAGNOSIS — Z86.59 HISTORY OF CLAUSTROPHOBIA: ICD-10-CM

## 2021-10-05 DIAGNOSIS — M21.372 ACQUIRED LEFT FOOT DROP: ICD-10-CM

## 2021-10-05 DIAGNOSIS — Z94.1 TRANSPLANTED HEART (H): ICD-10-CM

## 2021-10-05 DIAGNOSIS — Z94.1 S/P ORTHOTOPIC HEART TRANSPLANT (H): ICD-10-CM

## 2021-10-05 DIAGNOSIS — Z94.1 HEART REPLACED BY TRANSPLANT (H): ICD-10-CM

## 2021-10-05 DIAGNOSIS — M21.372 ACQUIRED LEFT FOOT DROP: Primary | ICD-10-CM

## 2021-10-05 DIAGNOSIS — M1A.3790 CHRONIC GOUT DUE TO RENAL IMPAIRMENT INVOLVING FOOT WITHOUT TOPHUS, UNSPECIFIED LATERALITY: ICD-10-CM

## 2021-10-05 DIAGNOSIS — Z94.1 HEART TRANSPLANT STATUS (H): ICD-10-CM

## 2021-10-05 LAB
ANION GAP SERPL CALCULATED.3IONS-SCNC: 8 MMOL/L (ref 3–14)
BASOPHILS # BLD AUTO: 0.1 10E3/UL (ref 0–0.2)
BASOPHILS NFR BLD AUTO: 1 %
BUN SERPL-MCNC: 37 MG/DL (ref 7–30)
CALCIUM SERPL-MCNC: 9.1 MG/DL (ref 8.5–10.1)
CHLORIDE BLD-SCNC: 108 MMOL/L (ref 94–109)
CK SERPL-CCNC: 44 U/L (ref 30–300)
CMV DNA SPEC NAA+PROBE-ACNC: NOT DETECTED IU/ML
CO2 SERPL-SCNC: 22 MMOL/L (ref 20–32)
CREAT SERPL-MCNC: 2.11 MG/DL (ref 0.66–1.25)
EOSINOPHIL # BLD AUTO: 0.2 10E3/UL (ref 0–0.7)
EOSINOPHIL NFR BLD AUTO: 3 %
ERYTHROCYTE [DISTWIDTH] IN BLOOD BY AUTOMATED COUNT: 18.6 % (ref 10–15)
GFR SERPL CREATININE-BSD FRML MDRD: 32 ML/MIN/1.73M2
GLUCOSE BLD-MCNC: 89 MG/DL (ref 70–99)
HCT VFR BLD AUTO: 33.6 % (ref 40–53)
HGB BLD-MCNC: 10 G/DL (ref 13.3–17.7)
HGB BLD-MCNC: 9.4 G/DL (ref 13.3–17.7)
IMM GRANULOCYTES # BLD: 0 10E3/UL
IMM GRANULOCYTES NFR BLD: 1 %
LYMPHOCYTES # BLD AUTO: 1.9 10E3/UL (ref 0.8–5.3)
LYMPHOCYTES NFR BLD AUTO: 30 %
MAGNESIUM SERPL-MCNC: 1.8 MG/DL (ref 1.6–2.3)
MCH RBC QN AUTO: 29.2 PG (ref 26.5–33)
MCHC RBC AUTO-ENTMCNC: 29.8 G/DL (ref 31.5–36.5)
MCV RBC AUTO: 98 FL (ref 78–100)
MONOCYTES # BLD AUTO: 0.7 10E3/UL (ref 0–1.3)
MONOCYTES NFR BLD AUTO: 11 %
NEUTROPHILS # BLD AUTO: 3.6 10E3/UL (ref 1.6–8.3)
NEUTROPHILS NFR BLD AUTO: 54 %
NRBC # BLD AUTO: 0 10E3/UL
NRBC BLD AUTO-RTO: 0 /100
OXYHGB MFR BLDV: 64 % (ref 92–100)
PHOSPHATE SERPL-MCNC: 3.8 MG/DL (ref 2.5–4.5)
PLATELET # BLD AUTO: 267 10E3/UL (ref 150–450)
POTASSIUM BLD-SCNC: 5.1 MMOL/L (ref 3.4–5.3)
RBC # BLD AUTO: 3.42 10E6/UL (ref 4.4–5.9)
SODIUM SERPL-SCNC: 138 MMOL/L (ref 133–144)
TACROLIMUS BLD-MCNC: 16.4 UG/L (ref 5–15)
TME LAST DOSE: ABNORMAL H
TME LAST DOSE: ABNORMAL H
WBC # BLD AUTO: 6.5 10E3/UL (ref 4–11)

## 2021-10-05 PROCEDURE — 86833 HLA CLASS II HIGH DEFIN QUAL: CPT | Performed by: PATHOLOGY

## 2021-10-05 PROCEDURE — 88346 IMFLUOR 1ST 1ANTB STAIN PX: CPT | Mod: 26 | Performed by: PATHOLOGY

## 2021-10-05 PROCEDURE — 85018 HEMOGLOBIN: CPT

## 2021-10-05 PROCEDURE — 72100 X-RAY EXAM L-S SPINE 2/3 VWS: CPT | Performed by: RADIOLOGY

## 2021-10-05 PROCEDURE — 82550 ASSAY OF CK (CPK): CPT | Performed by: PATHOLOGY

## 2021-10-05 PROCEDURE — 88346 IMFLUOR 1ST 1ANTB STAIN PX: CPT | Mod: TC | Performed by: INTERNAL MEDICINE

## 2021-10-05 PROCEDURE — 88350 IMFLUOR EA ADDL 1ANTB STN PX: CPT | Mod: 26 | Performed by: PATHOLOGY

## 2021-10-05 PROCEDURE — 250N000009 HC RX 250: Performed by: INTERNAL MEDICINE

## 2021-10-05 PROCEDURE — 85025 COMPLETE CBC W/AUTO DIFF WBC: CPT | Performed by: PATHOLOGY

## 2021-10-05 PROCEDURE — 86832 HLA CLASS I HIGH DEFIN QUAL: CPT | Performed by: PATHOLOGY

## 2021-10-05 PROCEDURE — G0463 HOSPITAL OUTPT CLINIC VISIT: HCPCS

## 2021-10-05 PROCEDURE — 87799 DETECT AGENT NOS DNA QUANT: CPT | Mod: 90 | Performed by: PATHOLOGY

## 2021-10-05 PROCEDURE — 99215 OFFICE O/P EST HI 40 MIN: CPT | Performed by: NURSE PRACTITIONER

## 2021-10-05 PROCEDURE — C1894 INTRO/SHEATH, NON-LASER: HCPCS | Performed by: INTERNAL MEDICINE

## 2021-10-05 PROCEDURE — 999N000142 HC STATISTIC PROCEDURE PREP ONLY

## 2021-10-05 PROCEDURE — 272N000001 HC OR GENERAL SUPPLY STERILE: Performed by: INTERNAL MEDICINE

## 2021-10-05 PROCEDURE — 88307 TISSUE EXAM BY PATHOLOGIST: CPT | Mod: 26 | Performed by: PATHOLOGY

## 2021-10-05 PROCEDURE — 999N000132 HC STATISTIC PP CARE STAGE 1

## 2021-10-05 PROCEDURE — 93505 ENDOMYOCARDIAL BIOPSY: CPT | Performed by: INTERNAL MEDICINE

## 2021-10-05 PROCEDURE — 84100 ASSAY OF PHOSPHORUS: CPT | Performed by: PATHOLOGY

## 2021-10-05 PROCEDURE — 80048 BASIC METABOLIC PNL TOTAL CA: CPT | Performed by: PATHOLOGY

## 2021-10-05 PROCEDURE — 80197 ASSAY OF TACROLIMUS: CPT | Mod: 90 | Performed by: PATHOLOGY

## 2021-10-05 PROCEDURE — 82810 BLOOD GASES O2 SAT ONLY: CPT

## 2021-10-05 PROCEDURE — 83735 ASSAY OF MAGNESIUM: CPT | Performed by: PATHOLOGY

## 2021-10-05 PROCEDURE — 36415 COLL VENOUS BLD VENIPUNCTURE: CPT | Performed by: PATHOLOGY

## 2021-10-05 PROCEDURE — 99204 OFFICE O/P NEW MOD 45 MIN: CPT | Performed by: FAMILY MEDICINE

## 2021-10-05 RX ORDER — ALLOPURINOL 300 MG/1
300 TABLET ORAL DAILY
Qty: 90 TABLET | Refills: 1 | Status: SHIPPED | OUTPATIENT
Start: 2021-10-05 | End: 2021-10-05

## 2021-10-05 RX ORDER — LIDOCAINE 40 MG/G
CREAM TOPICAL
Status: COMPLETED | OUTPATIENT
Start: 2021-10-05 | End: 2021-10-05

## 2021-10-05 RX ORDER — GABAPENTIN 300 MG/1
300 CAPSULE ORAL 3 TIMES DAILY
Qty: 60 CAPSULE | Refills: 1 | Status: ON HOLD | COMMUNITY
Start: 2021-10-05 | End: 2021-12-13

## 2021-10-05 RX ORDER — DIAZEPAM 5 MG
10 TABLET ORAL
Qty: 2 TABLET | Refills: 0 | Status: ON HOLD | OUTPATIENT
Start: 2021-10-05 | End: 2021-11-04

## 2021-10-05 RX ORDER — ALLOPURINOL 300 MG/1
300 TABLET ORAL DAILY
Qty: 30 TABLET | Refills: 0 | Status: SHIPPED | OUTPATIENT
Start: 2021-10-05 | End: 2021-11-18

## 2021-10-05 RX ADMIN — LIDOCAINE: 40 CREAM TOPICAL at 10:43

## 2021-10-05 ASSESSMENT — PAIN SCALES - GENERAL: PAINLEVEL: NO PAIN (0)

## 2021-10-05 ASSESSMENT — MIFFLIN-ST. JEOR
SCORE: 1612.73
SCORE: 1612.73

## 2021-10-05 ASSESSMENT — PATIENT HEALTH QUESTIONNAIRE - PHQ9: SUM OF ALL RESPONSES TO PHQ QUESTIONS 1-9: 0

## 2021-10-05 NOTE — PRE-PROCEDURE
GENERAL PRE-PROCEDURE:   Procedure:  Right heart catheterization, endomyocardial biopsy  Date/Time:  10/5/2021 10:58 AM    Written consent obtained?: Yes    Risks and benefits: Risks, benefits and alternatives were discussed    DC Plan: Appropriate discharge home plan in place for patients who are going home after procedure   Consent given by:  Patient  Patient states understanding of procedure being performed: Yes    Patient's understanding of procedure matches consent: Yes    Procedure consent matches procedure scheduled: Yes    Appropriately NPO:  Yes  Lungs:  Lungs clear with good breath sounds bilaterally  Heart:  Normal heart sounds and rate  History & Physical reviewed:  History and physical reviewed and no updates needed  Statement of review:  I have reviewed the lab findings, diagnostic data, medications, and the plan for sedation    Nini Caraballo PA-C  [facility]

## 2021-10-05 NOTE — PROGRESS NOTES
Pt tolerated oral intake. Pt discharged to [facility] via shuttle.

## 2021-10-05 NOTE — LETTER
10/5/2021      RE: Jim Willingham  [address] [facility] [address]       Dear Colleague,    Thank you for the opportunity to participate in the care of your patient, Jim Willingham, at the [facility] [place] [facility]. Please see a copy of my visit note below.    ADULT HEART TRANSPLANT CLINIC  October 5, 2021    HPI:   Mr. Jim Willingham is a 66yr old male with a history of NICM (s/p HM2 LVAD 6/2017), VT, AFib, CKD, DMII, COPD, now s/p OHT 5/6/21, who presents to clinic for routine surveillance.    His post-transplant course has been c/b right pleural air leak (required prolonged chest tube), pAFib, CAP (RLL, 6/2021), recurrent pleural effusions (s/p thoracenteses x2 6/2021 and 7/2021, exudative, repeatedly cultured negative), RLL cavitary lesions (per chest CT 7/14/21, BD glucan indeterminate, aspergillus negative, not started on antifungals), pericardial effusion (1.4cm per TTE 7/12/21, conservatively managed), low-grade EBV viremia, and recurrent/persistent gout flare (now on Anakinra).  He was recently hospitalized 8/24-9/2 with COVID-19, where he received monoclonal antibodies and remdesivir x5 days.  He was found to have a KALI cavitary lesion, so was started on voriconazole.    Rejection history:  none  AlloMap scores:  38 (8/2021), 35 (9/2021)  DSAs:  none  Coronary angio/Ischemic eval:  Deferred due to renal dysfunction --->  Note that per Dr. Montgomery, may defer until his baseline as he had a young donor  Last RHC:  9/21/21 showed mildly elevated biventricular filling pressures with RA 9, mPA 30, PCW 19, and CI 3.21.  Echo/cMRI:  TTE 8/29/21 showed stable graft function, with LVEF 55-60%, normal RV size/function, and resolution of pericardial effusion.    Since his last visit, he states that he feels okay overall.  He feels weaker, and notes that his balance has deteriorated.  His LLE strength seems worse in the recent weeks.  He is  using a cane and walker.  he is working with home PT, who is said that he has made some progress.  He has noted some improvement in his endurance.  He is trying to walk more regularly.  His breathing has been stable, he continues to note shortness of breath with a fair amount of exertion.  He is sleeping okay, and is able to lie flat in bed with no PND/orthopnea.  His appetite has been good, and he is eating much more regularly and drinking a protein shake at least daily.  He continues to have diarrhea off and on, but states that this has improved and his stools are now partially formed.  He is still having a few episodes per day, and notes that this is better than where he was at a few weeks ago.  He otherwise denies nausea and vomiting.  His weight is down, stating that he was down to 179#this morning.  He had been up to 195#in the recent history.  He was down to 186# a few weeks ago.  He denies fluid retention.  BPs have remained stable, ranging 110/70s.  His heart rates remain 100-110s, and rise with exercise.  He is occasionally lightheaded when rising too quickly or moving too quickly.  He has ongoing headaches, which have been present since transplant and remained stable.  He feels like his short-term memory has been impaired and is wondering if he had a stroke, as he feels like he has instances where he is unable to say what he is trying to say.  This has been going on for the last few months and has remained stable.  He believes the symptoms have worsened since his July.  His tremors have drastically improved, and he notes that he can actually right now.  He had blurred vision 1 day, which then went away and has not recurred, and he did not check his blood pressures at this time.  He has been taking anakinra as needed for gout.  He notes that his hand swelling has drastically improved since hospital discharge.  He otherwise denies chest pain, palpitations, falls, acute vision changes, fevers, chills, cough,  sore throat, and signs of bleeding.        Serostatus:  CMV D+/R+  EBV D+/R+  Toxo D-/R-      Patient Active Problem List    Diagnosis Date Noted     Sinus tachycardia 08/24/2021     Priority: Medium     Status post heart transplantation (H) 08/24/2021     Priority: Medium     Fever in adult 08/24/2021     Priority: Medium     Generalized muscle weakness 07/08/2021     Priority: Medium     Fall, initial encounter 07/08/2021     Priority: Medium     Pericardial effusion 07/08/2021     Priority: Medium     Added automatically from request for surgery [id number]       Heart replaced by transplant (H) 06/28/2021     Priority: Medium     Added automatically from request for surgery [id number]       Need for prophylactic antibiotic 05/12/2021     Priority: Medium     S/P orthotopic heart transplant (H) 02/05/2021     Priority: Medium     Added automatically from request for surgery [id number]       Right carpal tunnel syndrome 01/13/2021     Priority: Medium     Added automatically from request for surgery [id number]       Class 2 severe obesity due to excess calories with serious comorbidity and body mass index (BMI) of 35.0 to 35.9 in adult (H) 11/02/2020     Priority: Medium     Bilateral carpal tunnel syndrome 11/02/2020     Priority: Medium     Rotator cuff tear arthropathy of both shoulders 11/02/2020     Priority: Medium     COPD (chronic obstructive pulmonary disease) (H) 11/02/2020     Priority: Medium     Major depression, recurrent, chronic (H) 11/02/2020     Priority: Medium     Gouty arthropathy, chronic, without tophi 11/02/2020     Priority: Medium     Shortness of breath 08/05/2020     Priority: Medium     WALTER (acute kidney injury) (H) 08/05/2020     Priority: Medium     Iron deficiency anemia 07/31/2018     Priority: Medium     Chronic systolic congestive heart failure (H) 07/10/2018     Priority: Medium     Physical deconditioning 06/30/2017     Priority: Medium     Long-term (current) use of anticoagulants [Z79.01]  06/26/2017     Priority: Medium     Paroxysmal atrial fibrillation (H) 05/11/2017     Priority: Medium     Type 2 diabetes mellitus with complication, with long-term current use of insulin (H) 05/11/2017     Priority: Medium     Stage 3b chronic kidney disease (H) 05/11/2017     Priority: Medium     H/O gastric bypass 05/11/2017     Priority: Medium     CECILIA (obstructive sleep apnea) 05/11/2017     Priority: Medium     Vitamin B12 deficiency (non anemic) 05/11/2017     Priority: Medium     Paroxysmal VT (H) 05/11/2017     Priority: Medium     NICM (nonischemic cardiomyopathy) (H)/ EF 20% 05/11/2017     Priority: Medium     ECHO: LVEDd. 7.66 cm, Restrictive pattern , Severe mitral valve regurgitation         PAST MEDICAL HISTORY:  Past Medical History:   Diagnosis Date     Bariatric surgery status 2003     Benign essential hypertension 05/11/2017     Bilateral carpal tunnel syndrome 11/02/2020     Cardiomyopathy, unspecified (H) 05/08/2017     CKD (chronic kidney disease) stage 3, GFR 30-59 ml/min 05/11/2017     COPD (chronic obstructive pulmonary disease) (H) 11/02/2020     Depression 05/11/2017     Diabetes mellitus (H) 1995     Gouty arthropathy, chronic, without tophi 11/02/2020     H/O gastric bypass 05/11/2017     ICD (implantable cardioverter-defibrillator), biventricular, in situ 05/11/2017     LVAD (left ventricular assist device) present (H)      Major depression, recurrent, chronic (H) 11/02/2020     NICM (nonischemic cardiomyopathy) (H)/ EF 20% 05/11/2017    ECHO: LVEDd. 7.66 cm, Restrictive pattern , Severe mitral valve regurgitation     CECILIA (obstructive sleep apnea) 05/11/2017     Paroxysmal atrial fibrillation (H) 05/11/2017     Paroxysmal VT (H) 05/11/2017     Rotator cuff tear arthropathy of both shoulders 11/02/2020     Uncomplicated asthma      Vitamin B12 deficiency (non anemic) 05/11/2017       CURRENT MEDICATIONS:  Prescription Medications as of 10/5/2021       Rx Number Disp Refills Start End  Last Dispensed Date Next Fill Date Owning Pharmacy    acetaminophen (TYLENOL) 325 MG tablet  100 tablet 1 7/5/2021    [address]    Sig: Take 3 tablets (975 mg) by mouth every 6 hours as needed for other (For optimal non-opioid multimodal pain management to improve pain control.)    Class: E-Prescribe    Route: Oral    albuterol (VENTOLIN HFA) 108 (90 Base) MCG/ACT inhaler  18 g 3 9/28/2021    [facility] #[id number] [place] [address] [facility] [address]    Sig: INHALE 2 PUFFS BY MOUTH EVERY 4 HOURS AS NEEDED. MAX OF 12 PUFFS PER 24 HOURS    Class: E-Prescribe    Notes to Pharmacy: Pharmacy may dispense brand covered by insurance (Proair, or proventil or ventolin or generic albuterol inhaler)    allopurinol (ZYLOPRIM) 300 MG tablet  90 tablet 1 5/31/2021    [address]    Sig: Take 1 tablet (300 mg) by mouth daily    Class: E-Prescribe    Route: Oral    amitriptyline (ELAVIL) 25 MG tablet  30 tablet 0 9/9/2021    [id number] [address] [facility] [address]    Sig: Take 1 tablet (25 mg) by mouth At Bedtime    Class: E-Prescribe    Route: Oral    anakinra (KINERET) 100 MG/0.67ML SOSY injection  4.69 mL 11 8/24/2021    [facility] [address]    Sig: Inject subcutaneously once every 24 hours for 3 days. Contact MD if still with gout symptoms on day 3. Hold for signs of infection, then seek medical attention.    Class: E-Prescribe    anakinra (KINERET) 100 MG/0.67ML SOSY injection  20.1 mL 0 8/6/2021 9/27/2021   [facility] [place] [address]    Sig: Inject 0.67 mLs (100 mg) Subcutaneous daily For 3 days. Hold for signs of infection, then seek medical attention.    Class: Local Print    Notes to Pharmacy: DUPLICATE    Route: Subcutaneous     aspirin (ASA) 81 MG chewable tablet  100 tablet 1 6/1/2021    [facility] [place], [place] - [address] [place] [address]    Sig: Take 1 tablet (81 mg) by mouth daily    Class: E-Prescribe    Route: Oral    blood glucose (NO BRAND SPECIFIED) test strip  200 strip 3 8/16/2021    [facility] #[id number] [facility] [address] [facility] [address]    Sig: Use to test blood sugar four times daily or as directed.    Class: E-Prescribe    blood glucose (ONE TOUCH DELICA) lancing device  1 each 0 7/7/2021    [facility] #[id number] [facility] [address] AT [facility] [address]    Sig: Lancing device to be used with lancets.    Class: E-Prescribe    blood glucose monitoring (ONE TOUCH ULTRA 2) meter device kit  1 kit 0 1/24/2020    [facility] #[address] [place] [address] AT [facility] [address]    Sig: Use to test blood sugar four times daily or as directed.    Class: E-Prescribe    buPROPion (WELLBUTRIN) 75 MG tablet  60 tablet 1 9/28/2021    [id number] [address] [facility] [address]    Sig: Take 1 tablet (75 mg) by mouth 2 times daily    Class: E-Prescribe    Route: Oral    calcium citrate-vitamin D (CITRACAL) 315-250 MG-UNIT TABS per tablet  60 tablet 1 9/28/2021    [facility] [address] [place] [address] [address] AT [facility] [address]    Sig: Take 1 tablet by mouth 2 times daily    Class: E-Prescribe    Route: Oral    Continuous Blood Gluc Transmit (DEXCOM G6 TRANSMITTER) MISC    1/31/2021        Class: Historical    ferrous sulfate (FEROSUL) 325 (65 Fe) MG tablet  90 tablet 3 8/6/2021    [facility] #[id number] [facility] [address] [facility] [address]    Sig: Take 1 tablet (325 mg) by mouth daily (with breakfast)    Class: E-Prescribe    Route: Oral    Fluticasone-Umeclidin-Vilanterol (TRELEGY ELLIPTA) 100-62.5-25 MCG/INH oral inhaler  1 each 1  5/31/2021    [facility] [place], [place] - [address] [facility]    Sig: Inhale 1 puff into the lungs daily    Class: E-Prescribe    Route: Inhalation    gabapentin (NEURONTIN) 300 MG capsule  60 capsule 1 9/28/2021    [id number] [address] AT [facility]  [address]    Sig: Take 1 capsule (300 mg) by mouth 2 times daily At 12pm and 8pm    Class: E-Prescribe    Route: Oral    gabapentin (NEURONTIN) 300 MG capsule  30 capsule 1 5/31/2021    [facility] [place], [place] - [address] [facility]    Sig: Take 1 capsule (300 mg) by mouth every morning    Class: E-Prescribe    Route: Oral    insulin pen needle (32G X 4 MM) 32G X 4 MM miscellaneous  110 each 0 1/24/2020    [facility] #[id number] [facility] [address] [facility] [address]    Sig: Use four pen needles daily or as directed.    Class: E-Prescribe    Melatonin 10 MG CAPS            Sig: Take 1 capsule by mouth At Bedtime    Class: Historical    Route: Oral    montelukast (SINGULAIR) 10 MG tablet  90 tablet 0 9/28/2021    [id number] [address] AT [facility]  [address]    Sig: Take 1 tablet (10 mg) by mouth At Bedtime    Class: E-Prescribe    Route: Oral    mycophenolate (GENERIC EQUIVALENT) 250 MG capsule  120 capsule 3 9/7/2021    [facility] - [place], [place] - [address] [facility] [address]    Sig: Take 2 capsules (500 mg) by mouth 2 times daily    Class: E-Prescribe    Notes to Pharmacy: TXP DT 5/6/2021 (Heart) TXP Dischg DT 5/31/2021 DX Heart replaced by transplant Z94.1 [facility] ([place], [place])    Route: Oral    pantoprazole (PROTONIX) 40 MG EC tablet  60 tablet 1 8/9/2021    [id number] [employer] [address] AT [facility]  [address]    Sig: Take 1 tablet (40 mg) by mouth 2 times daily    Class: E-Prescribe    Route: Oral  "   tacrolimus (GENERIC EQUIVALENT) 1 MG capsule  900 capsule 11 10/1/2021        Sig: Take 1 capsule (1 mg) by mouth every morning AND 1 capsule (1 mg) every evening.    Class: Historical    Notes to Pharmacy: TXP DT 5/6/2021 (Heart) TXP Dischg DT 5/31/2021 DX Heart replaced by transplant Z94.1 [facility] ([place], [place])    Route: Oral    traMADol (ULTRAM) 50 MG tablet  25 tablet 1 9/28/2021    [facility] #[id number] [facility] [address] AT [facility]  [address]    Sig: Take 0.5-1 tablets (25-50 mg) by mouth every 6 hours as needed for moderate pain    Class: E-Prescribe    Route: Oral    voriconazole (VFEND) 200 MG tablet  90 tablet 1 9/9/2021    [facility] - [place], [place] - [address]    Sig: Take 1.5 tablets (300 mg) by mouth 2 times daily    Class: E-Prescribe    Route: Oral    Prior authorization: Closed - The  is not the PA processor for this patient and medication combination.          ROS:   Constitutional: No fever, chills, or sweats. Weight loss.   ENT: No visual disturbance, ear ache, epistaxis, sore throat.   Allergies/Immunologic: Negative.   Respiratory: As per HPI.  Cardiovascular: As per HPI.   GI: As per HPI.  : No urinary frequency, dysuria, or hematuria.   Integument: Negative.   Psychiatric: Negative.   Neuro: Negative.   Endocrinology: Negative.   Musculoskeletal: As per HPI.    Exam:  /79 (BP Location: Left arm, Patient Position: Chair, Cuff Size: Adult Regular)   Pulse 107   Ht 1.727 m (5' 8\")   Wt 84.8 kg (187 lb)   SpO2 98%   BMI 28.43 kg/m    In general, the patient is a pleasant male in no apparent distress.    HEENT: NC/AT. PERRLA. EOMI.  Sclerae white, not injected.    Neck:  No adenopathy, No thyromegaly.    COR: No jugular venous distention when sitting upright.  RRR.  Normal S1 S2 splits physiologically.  No murmur, rub click, or gallop.    Lungs:  CTA. No " rhonchi.    Abdomen: soft, nontender, nondistended.  No organomegaly.  Extremities:  No clubbing, cyanosis, or LE edema.    Neuro: Alert & Oriented x 3, grossly non focal.  Integument: No open lesions, rashes, or jaundice.    Labs:  CBC RESULTS:   Lab Results   Component Value Date    WBC 6.2 09/30/2021    WBC 7.8 07/11/2021    RBC 3.26 (L) 09/30/2021    RBC 3.06 (L) 07/11/2021    HGB 9.6 (L) 09/30/2021    HGB 8.7 (L) 07/11/2021    HCT 32.1 (L) 09/30/2021    HCT 28.4 (L) 07/11/2021    MCV 99 09/30/2021    MCV 93 07/11/2021    MCH 29.4 09/30/2021    MCH 28.4 07/11/2021    MCHC 29.9 (L) 09/30/2021    MCHC 30.6 (L) 07/11/2021    RDW 19.7 (H) 09/30/2021    RDW 15.6 (H) 07/11/2021     09/30/2021     07/11/2021       BMP RESULTS:  Lab Results   Component Value Date     09/30/2021     (L) 07/11/2021    POTASSIUM 5.9 (H) 09/30/2021    POTASSIUM 5.1 07/11/2021    CHLORIDE 111 (H) 09/30/2021    CHLORIDE 104 07/11/2021    CO2 23 09/30/2021    CO2 23 07/11/2021    ANIONGAP 2 (L) 09/30/2021    ANIONGAP 6 07/11/2021     (H) 09/30/2021     (H) 07/11/2021    BUN 29 09/30/2021    BUN 49 (H) 07/11/2021    CR 1.83 (H) 09/30/2021    CR 2.75 (H) 07/11/2021    GFRESTIMATED 38 (L) 09/30/2021    GFRESTIMATED 23 (L) 07/11/2021    GFRESTBLACK 27 (L) 07/11/2021    QAMAR 8.7 09/30/2021    QAMAR 7.9 (L) 07/11/2021      LIPID RESULTS:  Lab Results   Component Value Date    CHOL 146 09/21/2021    CHOL 209 (H) 11/05/2020    HDL 67 09/21/2021    HDL 87 11/05/2020    LDL 60 09/21/2021     (H) 11/05/2020    TRIG 94 09/21/2021    TRIG 91 11/05/2020    NHDL 79 09/21/2021    NHDL 122 11/05/2020       IMMUNOSUPPRESSANT LEVELS  Lab Results   Component Value Date    TACROL 10.6 09/30/2021    TACROL 11.4 07/11/2021    DOSTAC  09/30/2021      Comment:      Last dose information not provided.    DOSTAC Not Provided 07/11/2021       No components found for: CK  Lab Results   Component Value Date    MAG 2.0 09/27/2021     MAG 1.8 07/11/2021     Lab Results   Component Value Date    A1C 5.1 05/04/2021     Lab Results   Component Value Date    PHOS 2.7 09/27/2021    PHOS 2.8 06/30/2021     Lab Results   Component Value Date    NTBNPI 3,277 (H) 08/24/2021    NTBNPI 1,697 (H) 06/28/2021     Lab Results   Component Value Date    SAITESTMET [facility] 08/05/2021    SAITESTMET [facility] 06/07/2021    SAICELL Class I 08/05/2021    SAICELL Class I 06/07/2021    NV5KGSAHC Cw:12 08/05/2021    OB4VBHTFA Cw:12 06/07/2021    PC8HVRKDSG None 08/05/2021    FF9MYBQHKG None 06/07/2021    SAIREPCOM  08/05/2021      Test performed by modified procedure. Serum heat inactivated and tested by a modified ([facility] [place]) protocol including fetal calf serum addition. High-risk, mfi >3,000. Mod-risk, mfi 500-3,000.    SAIREPCOM  06/07/2021      Test performed by modified procedure. Serum heat inactivated and tested   by a modified ([facility]) protocol including fetal calf serum addition.   High-risk, mfi >3,000. Mod-risk, mfi 500-3,000.       Lab Results   Component Value Date    SAIITESTME [facility] 08/05/2021    SAIITESTME [facility] 06/07/2021    SAIICELL Class II 08/05/2021    SAIICELL Class II 06/07/2021    UR1VPEARI None 08/05/2021    VV2EXHXHS None 06/07/2021    GV5VUFRHWU None 08/05/2021    YE1FDTOUOR None 06/07/2021    SAIIREPCOM  08/05/2021      Test performed by modified procedure. Serum heat inactivated and tested by a modified ([facility] [place]) protocol including fetal calf serum addition. High-risk, mfi >3,000. Mod-risk, mfi 500-3,000.    SAIIREPCOM  06/07/2021      Test performed by modified procedure. Serum heat inactivated and tested   by a modified ([place]) protocol including fetal calf serum addition.   High-risk, mfi >3,000. Mod-risk, mfi 500-3,000.       Lab Results   Component Value Date    CSPEC Plasma 06/29/2021       Assessment and Plan:  Mr. Jim Willingham is a 66yr old male with a history of NICM (s/p HM2 LVAD 6/2017), VT, AFib, CKD, DMII, COPD, now s/p OHT 5/6/21, who  presents to clinic for routine surveillance.    Labs today show stable electrolytes, renal function, and blood counts.  DSAs, EBV, CMV, and tacro levels are in process.    He will have a RHC and biopsy following today's appointment.    Mr. Willingham appears stable today.  His blood pressures remain controlled.  His weight is down, and he appears euvolemic.    Given his short-term memory loss and word finding episodes, we will plan for him to repeat a head CT without contrast (defer contrast due to renal dysfunction).    He has not yet seen orthopedic provider regarding his foot drop.  Advised that he be evaluated in the orthopedic walk-in clinic today for further evaluation.    He is wondering if he can stop his amitriptyline so we will stop this today.    Encouraged him to increase his protein intake, and okayed him to take protein shakes twice daily.  Also encouraged him to work on walking more regularly.    We will plan for him to return to clinic per protocol, or sooner should new concerns arise.      YARED, s/p OHT 5/6/21  His post-transplant course has been c/b right pleural air leak (required prolonged chest tube), pAFib, CAP (RLL, 6/2021), recurrent pleural effusions (s/p thoracenteses x2 6/2021 and 7/2021, exudative, repeatedly cultured negative), RLL cavitary lesions (per chest CT 7/14/21, BD glucan indeterminate, aspergillus negative, not started on antifungals), pericardial effusion (1.4cm per TTE 7/12/21, conservatively managed), low-grade EBV viremia, and recurrent/persistent gout flare (now on Anakinra).  He was recently hospitalized 8/24-9/2 with COVID-19, where he received monoclonal antibodies and remdesivir x5 days.      Rejection history:  none  AlloMap scores:  38 (8/2021), 35 (9/2021)  DSAs:  none  Coronary angio/Ischemic eval:  Deferred due to renal dysfunction --->  Note that per Dr. Montgomery, may defer until his baseline as he had a young donor  Last RHC:  9/21/21 showed mildly elevated  biventricular filling pressures with RA 9, mPA 30, PCW 19, and CI 3.21.  Echo/cMRI:  TTE 8/29/21 showed stable graft function, with LVEF 55-60%, normal RV size/function, and resolution of pericardial effusion.    Serostatus:  - CMV D+/R+  - EBV D+/R+  - Toxo D-/R-    Immunosuppression:  - MMF 500mg twice daily (dose decreased due to multiple pneumonias)  - tacro, goal level 6-8 (level decrease due to multiple pneumonias).  He will need a level drawn this week as he took his am dose today.    PPx:  - CAV:  Aspirin 81mg daily.  Statin has been held due to recent transaminitis and questionable cholelithiasis.  - GI: Pantoprazole 40mg twice daily  - Osteoporosis: Calcium/vitamin D supplements    Graft function:  - BPs: Controlled, not on antihypertensives  - HRs:  100-110s  - fluid status: Euvolemic, not on diuretics    Health maintenance:  - Encouraged more regular aerobic activity  - needs to be evaluated in the orthopedic clinic  - Head CT this week    Recurrent exudative pleural effusions (6/2021, 7/2021)  KALI cavitary lesion  Mediastinal fluid collection  The effusions were negative for fungal/bacterial/AFB cx, so were treated with IV ABx then levaquin.  Urine and serum Blasto and Histo Ag were negative, A galactomannan was negative, and BD glucan was intermediate.  No antifungal therapy was started, with repeat CT 7/23/2021 showing improvement, but during last hospitalization, was found to have a consolidative opacity in the KALI.  He was started on zosyn and micafungin.  1,3 Beta D Glucan and Aspergillus galactomanan negative.  Per ID, imaging most suggestive of bacterial etiology, and not typical for fungal etiology.  Completed an extended course of antibiotics per ID, and was started on vori.  He will repeat a chest CT in the coming weeks.      EBV viremia  EBV has been lowly-detected, level today in process.     Gout/pseudogout  Predated transplant.  Has had recurrent flares following transplant that have  required steroid bursts and Anakinra treatment.  He continues to follow with rheumatology.  Continue allopurinol and anakinra.    WALTER on CKD  Creatinine had been stable at 1.4-1.6, but has been elevated to 1.9-2s in the recent weeks.  Likely secondary to tacro and mild hypovolemia. Encouraged adequate hydration, and will continue to monitor.     Recent transaminitis:  LFTs have improved, holding statin for now.  DMII:  management per PCP/endo  Depression:  Wellbutrin 75mg BID  COPD:  singulair, Trelegy Ellipta, Albuterol.  Trivial pericardial effusion, resolved:  Per TTE 7/12/21, resolved per TTE 9/2021.  COVID-19 infection: s/p monoclonal antibodies and remdesivir (8/2021).    The above was reviewed with Mr. Willingham, who verbalized understanding and will call with further questions/concerns.    50 minutes spent with patient, along with preparing for visit, reviewing follow up, and completing documentation.    Shelia Means, JOSE, FNP-BC, CHFN  Advanced Heart Failure Nurse Practitioner  [facility]  Patient Care Team:  Riacrdo Gómez MD as PCP - General (Internal Medicine)  Elfego Hayden MD as MD (Internal Medicine)  Delisa Montgomery MD as Referring Physician (Cardiology)  Chase Qureshi MD as MD (Internal Medicine-Hematology & Oncology)  Kadie Pedro LICSW as  ( - Clinical)  Jacque Mims MD as Assigned Endocrinology Provider  Elbert Pettit MD as Assigned Rheumatology Provider  Margaret Sinclair MD as Assigned Infectious Disease Provider  Forest Gonzalez MD as Assigned Palliative Care Provider  Ricardo Gómez MD as Assigned PCP  Nba Ag DO as Assigned Musculoskeletal Provider  Angelina Recinos [facility] as Pharmacist (Pharmacist)  Yolette Rueda, RN as Transplant Coordinator  Akshat Parkinson [facility] as Pharmacist (Pharmacist)  Francesca Odom [facility] as Pharmacist (Pharmacist)  Marcos Washington MD as MD (Infectious  Diseases)

## 2021-10-05 NOTE — PATIENT INSTRUCTIONS
Patient Instructions  1. Please stop in ortho clinic after today's visit.   2. Ok to stop amitriptyline.   3. Repeat tacro Thursday with home health  4.     Next transplant clinic appointment:  6 month follow up in 1 month.   Next lab draw: Thursday.   Coordinator will call with all pending results.     Please call transplant coordinator with any questions:    Yolette Rueda RN BSN   Post Heart Transplant Nurse Coordinator  [facility]  Questions: [phone]

## 2021-10-05 NOTE — NURSING NOTE
Transplant Coordinator Note    Reason for visit: 5 month follow up.   Coordinator: Present         Health concerns addressed today:  1. Statin deferred for elevated LFT's while inpatient. Hold off now per RC.   2. New tac goal 6-8. early  3. We stopped pred and bactrim last month with negative biopsy.  4. Meeting with PMD to discuss stopping amitriptyline.   5. Left foot drop. Ortho referral submitted.   6. F/u with rheum and ID.   7. Balance off. Fall.   8. Diarrhea off and on.   9. Potassium improved 5.1.   10. Cr 2.11.   11. SOB w/ exertion.  12. Losing weight.   13. Short term memory issues.       Immunosuppressants:  Tacro 6-8. Current 1 mg bid.       bid     No DSAS  Baseline angiogram? Deferred for now.   Immuknow 323 8/5      Resulted labs reviewed with patient  Medication record reviewed and reconciled  Questions and concerns addressed  Pt verbalized an understanding of plan of care.     Patient Instructions  1. Please stop in ortho clinic after today's visit.   2. Ok to stop amitriptyline.   3. Repeat tacro Thursday with home health  4.     Next transplant clinic appointment:  6 month follow up in 1 month.   Next lab draw: Thursday.   Coordinator will call with all pending results.     Please call transplant coordinator with any questions:    Yolette Rueda RN BSN   Post Heart Transplant Nurse Coordinator  [facility]  Questions: [phone]

## 2021-10-05 NOTE — PROGRESS NOTES
ADULT [facility]ANS[facility]  October 5, 2021    HPI:   Mr. Jim Willingham is a 66yr old male with a history of NICM (s/p HM2 LVAD 6/2017), VT, AFib, CKD, DMII, COPD, now s/p OHT 5/6/21, who presents to clinic for routine surveillance.    His post-transplant course has been c/b right pleural air leak (required prolonged chest tube), pAFib, CAP (RLL, 6/2021), recurrent pleural effusions (s/p thoracenteses x2 6/2021 and 7/2021, exudative, repeatedly cultured negative), RLL cavitary lesions (per chest CT 7/14/21, BD glucan indeterminate, aspergillus negative, not started on antifungals), pericardial effusion (1.4cm per TTE 7/12/21, conservatively managed), low-grade EBV viremia, and recurrent/persistent gout flare (now on Anakinra).  He was recently hospitalized 8/24-9/2 with COVID-19, where he received monoclonal antibodies and remdesivir x5 days.  He was found to have a KALI cavitary lesion, so was started on voriconazole.    Rejection history:  none  AlloMap scores:  38 (8/2021), 35 (9/2021)  DSAs:  none  Coronary angio/Ischemic eval:  Deferred due to renal dysfunction --->  Note that per Dr. Montgomery, may defer until his baseline as he had a young donor  Last RHC:  9/21/21 showed mildly elevated biventricular filling pressures with RA 9, mPA 30, PCW 19, and CI 3.21.  Echo/cMRI:  TTE 8/29/21 showed stable graft function, with LVEF 55-60%, normal RV size/function, and resolution of pericardial effusion.    Since his last visit, he states that he feels okay overall.  He feels weaker, and notes that his balance has deteriorated.  His LLE strength seems worse in the recent weeks.  He is using a cane and walker.  he is working with home PT, who is said that he has made some progress.  He has noted some improvement in his endurance.  He is trying to walk more regularly.  His breathing has been stable, he continues to note shortness of breath with a fair amount of exertion.  He is sleeping okay, and is able to lie flat in  bed with no PND/orthopnea.  His appetite has been good, and he is eating much more regularly and drinking a protein shake at least daily.  He continues to have diarrhea off and on, but states that this has improved and his stools are now partially formed.  He is still having a few episodes per day, and notes that this is better than where he was at a few weeks ago.  He otherwise denies nausea and vomiting.  His weight is down, stating that he was down to 179#this morning.  He had been up to 195#in the recent history.  He was down to 186# a few weeks ago.  He denies fluid retention.  BPs have remained stable, ranging 110/70s.  His heart rates remain 100-110s, and rise with exercise.  He is occasionally lightheaded when rising too quickly or moving too quickly.  He has ongoing headaches, which have been present since transplant and remained stable.  He feels like his short-term memory has been impaired and is wondering if he had a stroke, as he feels like he has instances where he is unable to say what he is trying to say.  This has been going on for the last few months and has remained stable.  He believes the symptoms have worsened since his July.  His tremors have drastically improved, and he notes that he can actually right now.  He had blurred vision 1 day, which then went away and has not recurred, and he did not check his blood pressures at this time.  He has been taking anakinra as needed for gout.  He notes that his hand swelling has drastically improved since hospital discharge.  He otherwise denies chest pain, palpitations, falls, acute vision changes, fevers, chills, cough, sore throat, and signs of bleeding.        Serostatus:  CMV D+/R+  EBV D+/R+  Toxo D-/R-      Patient Active Problem List    Diagnosis Date Noted     Sinus tachycardia 08/24/2021     Priority: Medium     Status post heart transplantation (H) 08/24/2021     Priority: Medium     Fever in adult 08/24/2021     Priority: Medium      Generalized muscle weakness 07/08/2021     Priority: Medium     Fall, initial encounter 07/08/2021     Priority: Medium     Pericardial effusion 07/08/2021     Priority: Medium     Added automatically from request for surgery [id number]       Heart replaced by transplant (H) 06/28/2021     Priority: Medium     Added automatically from request for surgery [id number]       Need for prophylactic antibiotic 05/12/2021     Priority: Medium     S/P orthotopic heart transplant (H) 02/05/2021     Priority: Medium     Added automatically from request for surgery [id number]       Right carpal tunnel syndrome 01/13/2021     Priority: Medium     Added automatically from request for surgery [id number]       Class 2 severe obesity due to excess calories with serious comorbidity and body mass index (BMI) of 35.0 to 35.9 in adult (H) 11/02/2020     Priority: Medium     Bilateral carpal tunnel syndrome 11/02/2020     Priority: Medium     Rotator cuff tear arthropathy of both shoulders 11/02/2020     Priority: Medium     COPD (chronic obstructive pulmonary disease) (H) 11/02/2020     Priority: Medium     Major depression, recurrent, chronic (H) 11/02/2020     Priority: Medium     Gouty arthropathy, chronic, without tophi 11/02/2020     Priority: Medium     Shortness of breath 08/05/2020     Priority: Medium     WALTER (acute kidney injury) (H) 08/05/2020     Priority: Medium     Iron deficiency anemia 07/31/2018     Priority: Medium     Chronic systolic congestive heart failure (H) 07/10/2018     Priority: Medium     Physical deconditioning 06/30/2017     Priority: Medium     Long-term (current) use of anticoagulants [Z79.01] 06/26/2017     Priority: Medium     Paroxysmal atrial fibrillation (H) 05/11/2017     Priority: Medium     Type 2 diabetes mellitus with complication, with long-term current use of insulin (H) 05/11/2017     Priority: Medium     Stage 3b chronic kidney disease (H) 05/11/2017     Priority: Medium     H/O gastric bypass  05/11/2017     Priority: Medium     CECILIA (obstructive sleep apnea) 05/11/2017     Priority: Medium     Vitamin B12 deficiency (non anemic) 05/11/2017     Priority: Medium     Paroxysmal VT (H) 05/11/2017     Priority: Medium     NICM (nonischemic cardiomyopathy) (H)/ EF 20% 05/11/2017     Priority: Medium     ECHO: LVEDd. 7.66 cm, Restrictive pattern , Severe mitral valve regurgitation         PAST MEDICAL HISTORY:  Past Medical History:   Diagnosis Date     Bariatric surgery status 2003     Benign essential hypertension 05/11/2017     Bilateral carpal tunnel syndrome 11/02/2020     Cardiomyopathy, unspecified (H) 05/08/2017     CKD (chronic kidney disease) stage 3, GFR 30-59 ml/min 05/11/2017     COPD (chronic obstructive pulmonary disease) (H) 11/02/2020     Depression 05/11/2017     Diabetes mellitus (H) 1995     Gouty arthropathy, chronic, without tophi 11/02/2020     H/O gastric bypass 05/11/2017     ICD (implantable cardioverter-defibrillator), biventricular, in situ 05/11/2017     LVAD (left ventricular assist device) present (H)      Major depression, recurrent, chronic (H) 11/02/2020     NICM (nonischemic cardiomyopathy) (H)/ EF 20% 05/11/2017    ECHO: LVEDd. 7.66 cm, Restrictive pattern , Severe mitral valve regurgitation     CECILIA (obstructive sleep apnea) 05/11/2017     Paroxysmal atrial fibrillation (H) 05/11/2017     Paroxysmal VT (H) 05/11/2017     Rotator cuff tear arthropathy of both shoulders 11/02/2020     Uncomplicated asthma      Vitamin B12 deficiency (non anemic) 05/11/2017       CURRENT MEDICATIONS:  Prescription Medications as of 10/5/2021       Rx Number Disp Refills Start End Last Dispensed Date Next Fill Date Owning Pharmacy    acetaminophen (TYLENOL) 325 MG tablet  100 tablet 1 7/5/2021    [facility] - [place], [place] - [address] [facility] [address]    Sig: Take 3 tablets (975 mg) by mouth every 6 hours as needed for other (For optimal non-opioid multimodal pain management to  improve pain control.)    Class: E-Prescribe    Route: Oral    albuterol (VENTOLIN HFA) 108 (90 Base) MCG/ACT inhaler  18 g 3 9/28/2021    [id number] [address] [facility] [address]    Sig: INHALE 2 PUFFS BY MOUTH EVERY 4 HOURS AS NEEDED. MAX OF 12 PUFFS PER 24 HOURS    Class: E-Prescribe    Notes to Pharmacy: Pharmacy may dispense brand covered by insurance (Proair, or proventil or ventolin or generic albuterol inhaler)    allopurinol (ZYLOPRIM) 300 MG tablet  90 tablet 1 5/31/2021    [address]    Sig: Take 1 tablet (300 mg) by mouth daily    Class: E-Prescribe    Route: Oral    amitriptyline (ELAVIL) 25 MG tablet  30 tablet 0 9/9/2021    [facility] [employer] [id number] [address] AT [facility] [address]    Sig: Take 1 tablet (25 mg) by mouth At Bedtime    Class: E-Prescribe    Route: Oral    anakinra (KINERET) 100 MG/0.67ML SOSY injection  4.69 mL 11 8/24/2021    [facility]    Sig: Inject subcutaneously once every 24 hours for 3 days. Contact MD if still with gout symptoms on day 3. Hold for signs of infection, then seek medical attention.    Class: E-Prescribe    anakinra (KINERET) 100 MG/0.67ML SOSY injection  20.1 mL 0 8/6/2021 9/27/2021   [facility]    Sig: Inject 0.67 mLs (100 mg) Subcutaneous daily For 3 days. Hold for signs of infection, then seek medical attention.    Class: Local Print    Notes to Pharmacy: DUPLICATE    Route: Subcutaneous    aspirin (ASA) 81 MG chewable tablet  100 tablet 1 6/1/2021    [address]    Sig: Take 1 tablet (81 mg) by mouth daily    Class: E-Prescribe    Route: Oral    blood glucose (NO BRAND SPECIFIED) test strip  200 strip 3 8/16/2021    [id number] -  [place] [address] AT Count [facility] [address]    Sig: Use to test blood sugar four times daily or as directed.    Class: E-Prescribe    blood glucose (ONE TOUCH DELICA) lancing device  1 each 0 7/7/2021    [facility] [employer] [address] [place] [address] [address] Count includes the [facility] [address]    Sig: Lancing device to be used with lancets.    Class: E-Prescribe    blood glucose monitoring (ONE TOUCH ULTRA 2) meter device kit  1 kit 0 1/24/2020    [facility] [employer] [facility] [place] [address] [address] AT Count includes the [facility] [address]    Sig: Use to test blood sugar four times daily or as directed.    Class: E-Prescribe    buPROPion (WELLBUTRIN) 75 MG tablet  60 tablet 1 9/28/2021    [facility] [employer] [facility] [place] [address] Count includes the [facility] [address]    Sig: Take 1 tablet (75 mg) by mouth 2 times daily    Class: E-Prescribe    Route: Oral    calcium citrate-vitamin D (CITRACAL) 315-250 MG-UNIT TABS per tablet  60 tablet 1 9/28/2021    [facility] [employer] [facility] [place] [address] [address] AT Count includes the [facility] [address]    Sig: Take 1 tablet by mouth 2 times daily    Class: E-Prescribe    Route: Oral    Continuous Blood Gluc Transmit (DEXCOM G6 TRANSMITTER) MISC    1/31/2021        Class: Historical    ferrous sulfate (FEROSUL) 325 (65 Fe) MG tablet  90 tablet 3 8/6/2021    [facility] [employer] #[id number] [place] [address] AT Count [facility] the [facility] [address]    Sig: Take 1 tablet (325 mg) by mouth daily (with breakfast)    Class: E-Prescribe    Route: Oral    Fluticasone-Umeclidin-Vilanterol (TRELEGY ELLIPTA) 100-62.5-25 MCG/INH oral inhaler  1 each 1 5/31/2021    [facility] [place], [place] - [address] [place] [address]    Sig: Inhale 1 puff into the lungs daily    Class: E-Prescribe    Route: Inhalation    gabapentin (NEURONTIN) 300 MG capsule  60 capsule 1 9/28/2021    [id number] - [place] - [address] Count includes the [facility] [address]    Sig: Take 1 capsule (300 mg) by mouth 2 times daily At 12pm and 8pm    Class: E-Prescribe    Route: Oral    gabapentin (NEURONTIN) 300 MG capsule  30 capsule 1 5/31/2021    [facility] - [place], [place] - [address]    Sig: Take 1 capsule (300 mg) by mouth every morning    Class: E-Prescribe    Route: Oral    insulin pen needle (32G X 4 MM) 32G X 4 MM miscellaneous  110 each 0 1/24/2020    [id number] [address] AT [facility]  [address]    Sig: Use four pen needles daily or as directed.    Class: E-Prescribe    Melatonin 10 MG CAPS            Sig: Take 1 capsule by mouth At Bedtime    Class: Historical    Route: Oral    montelukast (SINGULAIR) 10 MG tablet  90 tablet 0 9/28/2021    [id number] [address] [facility] [address]    Sig: Take 1 tablet (10 mg) by mouth At Bedtime    Class: E-Prescribe    Route: Oral    mycophenolate (GENERIC EQUIVALENT) 250 MG capsule  120 capsule 3 9/7/2021    [facility] - [place], [place] - [address]    Sig: Take 2 capsules (500 mg) by mouth 2 times daily    Class: E-Prescribe    Notes to Pharmacy: TXP DT 5/6/2021 (Heart) TXP Dischg DT 5/31/2021 DX Heart replaced by transplant Z94.1 TX [facility] ([place], [place])    Route: Oral    pantoprazole (PROTONIX) 40 MG EC tablet  60 tablet 1 8/9/2021    [id number] - [facility] [address] AT [facility]  [address]    Sig: Take 1 tablet (40 mg) by mouth 2 times daily    Class: E-Prescribe    Route: Oral    tacrolimus (GENERIC EQUIVALENT) 1 MG capsule  900 capsule 11 10/1/2021        Sig: Take 1 capsule (1 mg) by mouth every morning AND 1 capsule (1 mg) every evening.    Class: Historical    Notes to Pharmacy: TXP DT 5/6/2021 (Heart) TXP Dischg DT 5/31/2021 DX Heart replaced by transplant Z94.1 TX [facility]  "[facility] ([place], [place])    Route: Oral    traMADol (ULTRAM) 50 MG tablet  25 tablet 1 9/28/2021    [facility][id number] - [facility] [address] [facility]  [address]    Sig: Take 0.5-1 tablets (25-50 mg) by mouth every 6 hours as needed for moderate pain    Class: E-Prescribe    Route: Oral    voriconazole (VFEND) 200 MG tablet  90 tablet 1 9/9/2021    [facility] - [place], [place] - [address]    Sig: Take 1.5 tablets (300 mg) by mouth 2 times daily    Class: E-Prescribe    Route: Oral    Prior authorization: Closed - The  is not the PA processor for this patient and medication combination.          ROS:   Constitutional: No fever, chills, or sweats. Weight loss.   ENT: No visual disturbance, ear ache, epistaxis, sore throat.   Allergies/Immunologic: Negative.   Respiratory: As per HPI.  Cardiovascular: As per HPI.   GI: As per HPI.  : No urinary frequency, dysuria, or hematuria.   Integument: Negative.   Psychiatric: Negative.   Neuro: Negative.   Endocrinology: Negative.   Musculoskeletal: As per HPI.    Exam:  /79 (BP Location: Left arm, Patient Position: Chair, Cuff Size: Adult Regular)   Pulse 107   Ht 1.727 m (5' 8\")   Wt 84.8 kg (187 lb)   SpO2 98%   BMI 28.43 kg/m    In general, the patient is a pleasant male in no apparent distress.    HEENT: NC/AT. PERRLA. EOMI.  Sclerae white, not injected.    Neck:  No adenopathy, No thyromegaly.    COR: No jugular venous distention when sitting upright.  RRR.  Normal S1 S2 splits physiologically.  No murmur, rub click, or gallop.    Lungs:  CTA. No rhonchi.    Abdomen: soft, nontender, nondistended.  No organomegaly.  Extremities:  No clubbing, cyanosis, or LE edema.    Neuro: Alert & Oriented x 3, grossly non focal.  Integument: No open lesions, rashes, or jaundice.    Labs:  CBC RESULTS:   Lab Results   Component Value Date    WBC 6.2 09/30/2021    WBC 7.8 07/11/2021 "    RBC 3.26 (L) 09/30/2021    RBC 3.06 (L) 07/11/2021    HGB 9.6 (L) 09/30/2021    HGB 8.7 (L) 07/11/2021    HCT 32.1 (L) 09/30/2021    HCT 28.4 (L) 07/11/2021    MCV 99 09/30/2021    MCV 93 07/11/2021    MCH 29.4 09/30/2021    MCH 28.4 07/11/2021    MCHC 29.9 (L) 09/30/2021    MCHC 30.6 (L) 07/11/2021    RDW 19.7 (H) 09/30/2021    RDW 15.6 (H) 07/11/2021     09/30/2021     07/11/2021       BMP RESULTS:  Lab Results   Component Value Date     09/30/2021     (L) 07/11/2021    POTASSIUM 5.9 (H) 09/30/2021    POTASSIUM 5.1 07/11/2021    CHLORIDE 111 (H) 09/30/2021    CHLORIDE 104 07/11/2021    CO2 23 09/30/2021    CO2 23 07/11/2021    ANIONGAP 2 (L) 09/30/2021    ANIONGAP 6 07/11/2021     (H) 09/30/2021     (H) 07/11/2021    BUN 29 09/30/2021    BUN 49 (H) 07/11/2021    CR 1.83 (H) 09/30/2021    CR 2.75 (H) 07/11/2021    GFRESTIMATED 38 (L) 09/30/2021    GFRESTIMATED 23 (L) 07/11/2021    GFRESTBLACK 27 (L) 07/11/2021    QAMAR 8.7 09/30/2021    QAMAR 7.9 (L) 07/11/2021      LIPID RESULTS:  Lab Results   Component Value Date    CHOL 146 09/21/2021    CHOL 209 (H) 11/05/2020    HDL 67 09/21/2021    HDL 87 11/05/2020    LDL 60 09/21/2021     (H) 11/05/2020    TRIG 94 09/21/2021    TRIG 91 11/05/2020    NHDL 79 09/21/2021    NHDL 122 11/05/2020       IMMUNOSUPPRESSANT LEVELS  Lab Results   Component Value Date    TACROL 10.6 09/30/2021    TACROL 11.4 07/11/2021    DOSTAC  09/30/2021      Comment:      Last dose information not provided.    DOSTAC Not Provided 07/11/2021       No components found for: CK  Lab Results   Component Value Date    MAG 2.0 09/27/2021    MAG 1.8 07/11/2021     Lab Results   Component Value Date    A1C 5.1 05/04/2021     Lab Results   Component Value Date    PHOS 2.7 09/27/2021    PHOS 2.8 06/30/2021     Lab Results   Component Value Date    NTBNPI 3,277 (H) 08/24/2021    NTBNPI 1,697 (H) 06/28/2021     Lab Results   Component Value Date    SAITESTMET SA  FCS 08/05/2021    SAITESTMET [facility] 06/07/2021    SAICELL Class I 08/05/2021    SAICELL Class I 06/07/2021    [id number] Cw:12 08/05/2021    [id number] Cw:12 06/07/2021    [id number] None 08/05/2021    [id number] None 06/07/2021    SAIREPCOM  08/05/2021      Test performed by modified procedure. Serum heat inactivated and tested by a modified (Shonto) protocol including fetal calf serum addition. High-risk, mfi >3,000. Mod-risk, mfi 500-3,000.    SAIREPCOM  06/07/2021      Test performed by modified procedure. Serum heat inactivated and tested   by a modified (Shonto) protocol including fetal calf serum addition.   High-risk, mfi >3,000. Mod-risk, mfi 500-3,000.       Lab Results   Component Value Date    SAIITESTME [facility] 08/05/2021    SAIITESTME [facility] 06/07/2021    SAIICELL Class II 08/05/2021    SAIICELL Class II 06/07/2021    [id number] None 08/05/2021    [id number] None 06/07/2021    [id number] None 08/05/2021    [id number] None 06/07/2021    SAIIREPCOM  08/05/2021      Test performed by modified procedure. Serum heat inactivated and tested by a modified (Shonto) protocol including fetal calf serum addition. High-risk, mfi >3,000. Mod-risk, mfi 500-3,000.    SAIIREPCOM  06/07/2021      Test performed by modified procedure. Serum heat inactivated and tested   by a modified (Shonto) protocol including fetal calf serum addition.   High-risk, mfi >3,000. Mod-risk, mfi 500-3,000.       Lab Results   Component Value Date    CSPEC Plasma 06/29/2021       Assessment and Plan:  Mr. Jim Willingham is a 66yr old male with a history of NICM (s/p HM2 LVAD 6/2017), VT, AFib, CKD, DMII, COPD, now s/p OHT 5/6/21, who presents to clinic for routine surveillance.    Labs today show stable electrolytes, renal function, and blood counts.  DSAs, EBV, CMV, and tacro levels are in process.    He will have a RHC and biopsy following today's appointment.    Mr. Willingham appears stable today.  His blood pressures remain controlled.  His weight is  down, and he appears euvolemic.    Given his short-term memory loss and word finding episodes, we will plan for him to repeat a head CT without contrast (defer contrast due to renal dysfunction).    He has not yet seen orthopedic provider regarding his foot drop.  Advised that he be evaluated in the orthopedic walk-in clinic today for further evaluation.    He is wondering if he can stop his amitriptyline so we will stop this today.    Encouraged him to increase his protein intake, and okayed him to take protein shakes twice daily.  Also encouraged him to work on walking more regularly.    We will plan for him to return to clinic per protocol, or sooner should new concerns arise.      NICM, s/p OHT 5/6/21  His post-transplant course has been c/b right pleural air leak (required prolonged chest tube), pAFib, CAP (RLL, 6/2021), recurrent pleural effusions (s/p thoracenteses x2 6/2021 and 7/2021, exudative, repeatedly cultured negative), RLL cavitary lesions (per chest CT 7/14/21, BD glucan indeterminate, aspergillus negative, not started on antifungals), pericardial effusion (1.4cm per TTE 7/12/21, conservatively managed), low-grade EBV viremia, and recurrent/persistent gout flare (now on Anakinra).  He was recently hospitalized 8/24-9/2 with COVID-19, where he received monoclonal antibodies and remdesivir x5 days.      Rejection history:  none  AlloMap scores:  38 (8/2021), 35 (9/2021)  DSAs:  none  Coronary angio/Ischemic eval:  Deferred due to renal dysfunction --->  Note that per Dr. Montgomery, may defer until his baseline as he had a young donor  Last RHC:  9/21/21 showed mildly elevated biventricular filling pressures with RA 9, mPA 30, PCW 19, and CI 3.21.  Echo/cMRI:  TTE 8/29/21 showed stable graft function, with LVEF 55-60%, normal RV size/function, and resolution of pericardial effusion.    Serostatus:  - CMV D+/R+  - EBV D+/R+  - Toxo D-/R-    Immunosuppression:  - MMF 500mg twice daily (dose decreased due  to multiple pneumonias)  - tacro, goal level 6-8 (level decrease due to multiple pneumonias).  He will need a level drawn this week as he took his am dose today.    PPx:  - CAV:  Aspirin 81mg daily.  Statin has been held due to recent transaminitis and questionable cholelithiasis.  - GI: Pantoprazole 40mg twice daily  - Osteoporosis: Calcium/vitamin D supplements    Graft function:  - BPs: Controlled, not on antihypertensives  - HRs:  100-110s  - fluid status: Euvolemic, not on diuretics    Health maintenance:  - Encouraged more regular aerobic activity  - needs to be evaluated in the orthopedic clinic  - Head CT this week    Recurrent exudative pleural effusions (6/2021, 7/2021)  KALI cavitary lesion  Mediastinal fluid collection  The effusions were negative for fungal/bacterial/AFB cx, so were treated with IV ABx then levaquin.  Urine and serum Blasto and Histo Ag were negative, A galactomannan was negative, and BD glucan was intermediate.  No antifungal therapy was started, with repeat CT 7/23/2021 showing improvement, but during last hospitalization, was found to have a consolidative opacity in the KALI.  He was started on zosyn and micafungin.  1,3 Beta D Glucan and Aspergillus galactomanan negative.  Per ID, imaging most suggestive of bacterial etiology, and not typical for fungal etiology.  Completed an extended course of antibiotics per ID, and was started on vori.  He will repeat a chest CT in the coming weeks.      EBV viremia  EBV has been lowly-detected, level today in process.     Gout/pseudogout  Predated transplant.  Has had recurrent flares following transplant that have required steroid bursts and Anakinra treatment.  He continues to follow with rheumatology.  Continue allopurinol and anakinra.    WALTER on CKD  Creatinine had been stable at 1.4-1.6, but has been elevated to 1.9-2s in the recent weeks.  Likely secondary to tacro and mild hypovolemia. Encouraged adequate hydration, and will continue to  monitor.     Recent transaminitis:  LFTs have improved, holding statin for now.  DMII:  management per PCP/endo  Depression:  Wellbutrin 75mg BID  COPD:  singulair, Trelegy Ellipta, Albuterol.  Trivial pericardial effusion, resolved:  Per TTE 7/12/21, resolved per TTE 9/2021.  COVID-19 infection: s/p monoclonal antibodies and remdesivir (8/2021).      The above was reviewed with  Willingham, who verbalized understanding and will call with further questions/concerns.    50 minutes spent with patient, along with preparing for visit, reviewing follow up, and completing documentation.      Shelia Means, JOSE, FNP-BC, CHFN  Advanced Heart Failure Nurse Practitioner  [facility]  Patient Care Team:  Ricardo Gómez MD as PCP - General (Internal Medicine)  Elfego Hayden MD as MD (Internal Medicine)  Delisa Montgomery MD as Referring Physician (Cardiology)  Chase Qureshi MD as MD (Internal Medicine-Hematology & Oncology)  Kadie Pedro LICSW as  ( - Clinical)  Delisa Montgomery MD as Assigned Heart and Vascular Provider  Jacque Mims MD as Assigned Endocrinology Provider  Elbert Pettit MD as Assigned Rheumatology Provider  Chase Qureshi MD as Assigned Pulmonology Provider  Margaret Sinclair MD as Assigned Infectious Disease Provider  Forest Gonzalez MD as Assigned Palliative Care Provider  Ricardo Gómez MD as Assigned PCP  Nba Ag DO as Assigned Musculoskeletal Provider  Angelina Recinos [facility] as Pharmacist (Pharmacist)  Yolette Rueda RN as Transplant Coordinator  Akshat Parkinson [facility] as Pharmacist (Pharmacist)  Francesca Odom [facility] as Pharmacist (Pharmacist)  Marcos Washington MD as MD (Infectious Diseases)

## 2021-10-05 NOTE — PROGRESS NOTES
Pt arrived on 2a post RHC and biopsy. VSS Ra. Dressing c/d/i. No pain. Discharge instructions reviewed by Clara BARDALES RN, copy given to pt. Pt eating and drinking.

## 2021-10-05 NOTE — NURSING NOTE
Chief Complaint   Patient presents with     Follow Up     Transplant      Vitals were taken and medications were reconciled.   Johnnie Silva, EMT  8:33 AM

## 2021-10-05 NOTE — TELEPHONE ENCOUNTER
Called patient to reschedule appointment per Gómez. Patient answered and we were able to change that appointment.

## 2021-10-05 NOTE — PROGRESS NOTES
CHIEF COMPLAINT:  Pain of the Left Foot       HISTORY OF PRESENT ILLNESS  Mr. Willingham is a pleasant 66 year old year old male who presents to clinic today with a left foot drop.  Sundown explains that he had a heart transplant and began noticing he was slapping his foot against the ground while he walked.     Onset: gradual  Location: left ankle  Quality:  weakness  Duration: 2 months   Severity: N/A  Timing:constant  Modifying factors:  resting and non-use makes it better, movement and use makes it worse  Associated signs & symptoms: weakness of the tibialis anterior   Previous similar pain: No  Treatments to date: Home Physical therapy    Additional history: as documented    Review of Systems:    Have you recently had a a fever, chills, weight loss? No    Do you have any vision problems? No    Do you have any chest pain or edema? No    Do you have any shortness of breath or wheezing?  No    Do you have stomach problems? No    Do you have any numbness or focal weakness? Yes, left leg weakness    Do you have diabetes? Yes, type 2    Do you have problems with bleeding or clotting? No    Do you have an rashes or other skin lesions? No    MEDICAL HISTORY  Patient Active Problem List   Diagnosis     Paroxysmal atrial fibrillation (H)     Type 2 diabetes mellitus with complication, with long-term current use of insulin (H)     Stage 3b chronic kidney disease (H)     H/O gastric bypass     CECILIA (obstructive sleep apnea)     Vitamin B12 deficiency (non anemic)     Paroxysmal VT (H)     NICM (nonischemic cardiomyopathy) (H)/ EF 20%     Long-term (current) use of anticoagulants [Z79.01]     Physical deconditioning     Chronic systolic congestive heart failure (H)     Iron deficiency anemia     Shortness of breath     WALTER (acute kidney injury) (H)     Class 2 severe obesity due to excess calories with serious comorbidity and body mass index (BMI) of 35.0 to 35.9 in adult (H)     Bilateral carpal tunnel syndrome     Rotator cuff  tear arthropathy of both shoulders     COPD (chronic obstructive pulmonary disease) (H)     Major depression, recurrent, chronic (H)     Gouty arthropathy, chronic, without tophi     Right carpal tunnel syndrome     Need for prophylactic antibiotic     S/P orthotopic heart transplant (H)     Heart replaced by transplant (H)     Generalized muscle weakness     Fall, initial encounter     Pericardial effusion     Sinus tachycardia     Status post heart transplantation (H)     Fever in adult       Current Outpatient Medications   Medication Sig Dispense Refill     acetaminophen (TYLENOL) 325 MG tablet Take 3 tablets (975 mg) by mouth every 6 hours as needed for other (For optimal non-opioid multimodal pain management to improve pain control.) 100 tablet 1     albuterol (VENTOLIN HFA) 108 (90 Base) MCG/ACT inhaler INHALE 2 PUFFS BY MOUTH EVERY 4 HOURS AS NEEDED. MAX OF 12 PUFFS PER 24 HOURS 18 g 3     allopurinol (ZYLOPRIM) 300 MG tablet Take 1 tablet (300 mg) by mouth daily 30 tablet 0     anakinra (KINERET) 100 MG/0.67ML SOSY injection Inject subcutaneously once every 24 hours for 3 days. Contact MD if still with gout symptoms on day 3. Hold for signs of infection, then seek medical attention. 4.69 mL 11     anakinra (KINERET) 100 MG/0.67ML SOSY injection Inject 0.67 mLs (100 mg) Subcutaneous daily For 3 days. Hold for signs of infection, then seek medical attention. 20.1 mL 0     aspirin (ASA) 81 MG chewable tablet Take 1 tablet (81 mg) by mouth daily 100 tablet 1     blood glucose (NO BRAND SPECIFIED) test strip Use to test blood sugar four times daily or as directed. 200 strip 3     blood glucose (ONE TOUCH DELICA) lancing device Lancing device to be used with lancets. 1 each 0     blood glucose monitoring (ONE TOUCH ULTRA 2) meter device kit Use to test blood sugar four times daily or as directed. 1 kit 0     buPROPion (WELLBUTRIN) 75 MG tablet Take 1 tablet (75 mg) by mouth 2 times daily 60 tablet 1     calcium  citrate-vitamin D (CITRACAL) 315-250 MG-UNIT TABS per tablet Take 1 tablet by mouth 2 times daily 60 tablet 1     Continuous Blood Gluc Transmit (DEXCOM G6 TRANSMITTER) MISC        ferrous sulfate (FEROSUL) 325 (65 Fe) MG tablet Take 1 tablet (325 mg) by mouth daily (with breakfast) 90 tablet 3     Fluticasone-Umeclidin-Vilanterol (TRELEGY ELLIPTA) 100-62.5-25 MCG/INH oral inhaler Inhale 1 puff into the lungs daily 1 each 1     gabapentin (NEURONTIN) 300 MG capsule Take 1 capsule (300 mg) by mouth 3 times daily 60 capsule 1     insulin pen needle (32G X 4 MM) 32G X 4 MM miscellaneous Use four pen needles daily or as directed. 110 each 0     Melatonin 10 MG CAPS Take 1 capsule by mouth At Bedtime       montelukast (SINGULAIR) 10 MG tablet Take 1 tablet (10 mg) by mouth At Bedtime 90 tablet 0     mycophenolate (GENERIC EQUIVALENT) 250 MG capsule Take 2 capsules (500 mg) by mouth 2 times daily 120 capsule 3     pantoprazole (PROTONIX) 40 MG EC tablet Take 1 tablet (40 mg) by mouth 2 times daily 60 tablet 1     tacrolimus (GENERIC EQUIVALENT) 1 MG capsule Take 1 capsule (1 mg) by mouth every morning AND 1 capsule (1 mg) every evening. 900 capsule 11     traMADol (ULTRAM) 50 MG tablet Take 0.5-1 tablets (25-50 mg) by mouth every 6 hours as needed for moderate pain 25 tablet 1     voriconazole (VFEND) 200 MG tablet Take 1.5 tablets (300 mg) by mouth 2 times daily 90 tablet 1       Allergies   Allergen Reactions     Grass Shortness Of Breath     Ace Inhibitors Cough     Cats      Dust Mites Other (See Comments)     Asthma     Mold Other (See Comments)     Asthma     Penicillins Other (See Comments)     Unknown - childhood exposure    Tolerated Zosyn 9/18-9/20/2020    Per patient report he has tolerated amoxicillin     Sulfa Drugs Other (See Comments) and Unknown     Unknown childhood reaction       Family History   Problem Relation Age of Onset     Cerebrovascular Disease Mother 64     Diabetes Mother      Hypertension  "Mother      Coronary Artery Disease Father      Diabetes Type 2  Father      Obesity Brother      Obesity Brother      Cerebrovascular Disease Daughter 40       Additional medical/Social/Surgical histories reviewed in [facility] and updated as appropriate.       PHYSICAL EXAM  Ht 1.727 m (5' 8\")   Wt 84.8 kg (187 lb)   BMI 28.43 kg/m      General  - normal appearance, in no obvious distress  Musculoskeletal - lumbar spine  - stance: mild slappage of left foot affecting left lower extremity  - inspection: normal bone and joint alignment, no obvious scoliosis  - palpation: bilateral lumbar paravertebral spasm  - ROM: pain with bilateral rotation, flexion past 30 deg  - strength: lower extremities 5/5 in all planes  - special tests:  (-) straight leg raise bilaterally  (-) slump test  Neuro  - no sensory deficit, 3-/5 ankle dorsiflexion 5/5 ankle plantarflexion 3-/5 hallux extension.  3+/5 ankle eversion, 5/5 ankle inversion, 5/5 knee extension and flexion.  Skin  - no ecchymosis, erythema, warmth, or induration, no obvious rash  Psych  - interactive, appropriate, normal mood and affect    IMAGING : XR lumbar 2V. Final results and radiologist's interpretation, available in the [facility] health record. Images were reviewed with the patient/family members in the office today. My personal interpretation of the performed imaging is arthritic changes of facet joints. L3-L4 moderate disc space narrowing     ASSESSMENT & PLAN  Mr. Willingham is a 66 year old year old male who presents to clinic today with acute left foot drop in the setting of low back pain. Concern for L5 nerve root impingement. XR with lumbar degenerative changes.    Diagnosis:   1. Foot drop acquired, left foot  2. Claustrophobia  3. S/p heart transplant  4. Lumbar DDD    -MRI lumbar spine without contrast  -Diazepam for claustrophobia during mri  -Continue PT for lower extremity tibialis anterior and extensor hallucis strengthening  -Due to concurrent medications for " heart transplant and DM2 on insulin will avoid NSAID course or oral corticosteroids.  -Follow up after MRI.    It was a pleasure seeing Jim today.    Nba Ag DO, CAQSM  Primary Care Sports Medicine

## 2021-10-05 NOTE — LETTER
10/5/2021      RE: Jim Willingham  [address] [facility] [address]       CHIEF COMPLAINT:  Pain of the Left Foot       HISTORY OF PRESENT ILLNESS  Mr. Willingham is a pleasant 66 year old year old male who presents to clinic today with a left foot drop.  Jim explains that he had a heart transplant and began noticing he was slapping his foot against the ground while he walked.     Onset: gradual  Location: left ankle  Quality:  weakness  Duration: 2 months   Severity: N/A  Timing:constant  Modifying factors:  resting and non-use makes it better, movement and use makes it worse  Associated signs & symptoms: weakness of the tibialis anterior   Previous similar pain: No  Treatments to date: Home Physical therapy    Additional history: as documented    Review of Systems:    Have you recently had a a fever, chills, weight loss? No    Do you have any vision problems? No    Do you have any chest pain or edema? No    Do you have any shortness of breath or wheezing?  No    Do you have stomach problems? No    Do you have any numbness or focal weakness? Yes, left leg weakness    Do you have diabetes? Yes, type 2    Do you have problems with bleeding or clotting? No    Do you have an rashes or other skin lesions? No    MEDICAL HISTORY  Patient Active Problem List   Diagnosis     Paroxysmal atrial fibrillation (H)     Type 2 diabetes mellitus with complication, with long-term current use of insulin (H)     Stage 3b chronic kidney disease (H)     H/O gastric bypass     CECILIA (obstructive sleep apnea)     Vitamin B12 deficiency (non anemic)     Paroxysmal VT (H)     NICM (nonischemic cardiomyopathy) (H)/ EF 20%     Long-term (current) use of anticoagulants [Z79.01]     Physical deconditioning     Chronic systolic congestive heart failure (H)     Iron deficiency anemia     Shortness of breath     WALTER (acute kidney injury) (H)     Class 2 severe obesity due to excess calories with serious comorbidity and body mass index (BMI)  of 35.0 to 35.9 in adult (H)     Bilateral carpal tunnel syndrome     Rotator cuff tear arthropathy of both shoulders     COPD (chronic obstructive pulmonary disease) (H)     Major depression, recurrent, chronic (H)     Gouty arthropathy, chronic, without tophi     Right carpal tunnel syndrome     Need for prophylactic antibiotic     S/P orthotopic heart transplant (H)     Heart replaced by transplant (H)     Generalized muscle weakness     Fall, initial encounter     Pericardial effusion     Sinus tachycardia     Status post heart transplantation (H)     Fever in adult       Current Outpatient Medications   Medication Sig Dispense Refill     acetaminophen (TYLENOL) 325 MG tablet Take 3 tablets (975 mg) by mouth every 6 hours as needed for other (For optimal non-opioid multimodal pain management to improve pain control.) 100 tablet 1     albuterol (VENTOLIN HFA) 108 (90 Base) MCG/ACT inhaler INHALE 2 PUFFS BY MOUTH EVERY 4 HOURS AS NEEDED. MAX OF 12 PUFFS PER 24 HOURS 18 g 3     allopurinol (ZYLOPRIM) 300 MG tablet Take 1 tablet (300 mg) by mouth daily 30 tablet 0     anakinra (KINERET) 100 MG/0.67ML SOSY injection Inject subcutaneously once every 24 hours for 3 days. Contact MD if still with gout symptoms on day 3. Hold for signs of infection, then seek medical attention. 4.69 mL 11     anakinra (KINERET) 100 MG/0.67ML SOSY injection Inject 0.67 mLs (100 mg) Subcutaneous daily For 3 days. Hold for signs of infection, then seek medical attention. 20.1 mL 0     aspirin (ASA) 81 MG chewable tablet Take 1 tablet (81 mg) by mouth daily 100 tablet 1     blood glucose (NO BRAND SPECIFIED) test strip Use to test blood sugar four times daily or as directed. 200 strip 3     blood glucose (ONE TOUCH DELICA) lancing device Lancing device to be used with lancets. 1 each 0     blood glucose monitoring (ONE TOUCH ULTRA 2) meter device kit Use to test blood sugar four times daily or as directed. 1 kit 0     buPROPion (WELLBUTRIN)  75 MG tablet Take 1 tablet (75 mg) by mouth 2 times daily 60 tablet 1     calcium citrate-vitamin D (CITRACAL) 315-250 MG-UNIT TABS per tablet Take 1 tablet by mouth 2 times daily 60 tablet 1     Continuous Blood Gluc Transmit (DEXCOM G6 TRANSMITTER) MISC        ferrous sulfate (FEROSUL) 325 (65 Fe) MG tablet Take 1 tablet (325 mg) by mouth daily (with breakfast) 90 tablet 3     Fluticasone-Umeclidin-Vilanterol (TRELEGY ELLIPTA) 100-62.5-25 MCG/INH oral inhaler Inhale 1 puff into the lungs daily 1 each 1     gabapentin (NEURONTIN) 300 MG capsule Take 1 capsule (300 mg) by mouth 3 times daily 60 capsule 1     insulin pen needle (32G X 4 MM) 32G X 4 MM miscellaneous Use four pen needles daily or as directed. 110 each 0     Melatonin 10 MG CAPS Take 1 capsule by mouth At Bedtime       montelukast (SINGULAIR) 10 MG tablet Take 1 tablet (10 mg) by mouth At Bedtime 90 tablet 0     mycophenolate (GENERIC EQUIVALENT) 250 MG capsule Take 2 capsules (500 mg) by mouth 2 times daily 120 capsule 3     pantoprazole (PROTONIX) 40 MG EC tablet Take 1 tablet (40 mg) by mouth 2 times daily 60 tablet 1     tacrolimus (GENERIC EQUIVALENT) 1 MG capsule Take 1 capsule (1 mg) by mouth every morning AND 1 capsule (1 mg) every evening. 900 capsule 11     traMADol (ULTRAM) 50 MG tablet Take 0.5-1 tablets (25-50 mg) by mouth every 6 hours as needed for moderate pain 25 tablet 1     voriconazole (VFEND) 200 MG tablet Take 1.5 tablets (300 mg) by mouth 2 times daily 90 tablet 1       Allergies   Allergen Reactions     Grass Shortness Of Breath     Ace Inhibitors Cough     Cats      Dust Mites Other (See Comments)     Asthma     Mold Other (See Comments)     Asthma     Penicillins Other (See Comments)     Unknown - childhood exposure    Tolerated Zosyn 9/18-9/20/2020    Per patient report he has tolerated amoxicillin     Sulfa Drugs Other (See Comments) and Unknown     Unknown childhood reaction       Family History   Problem Relation Age of  "Onset     Cerebrovascular Disease Mother 64     Diabetes Mother      Hypertension Mother      Coronary Artery Disease Father      Diabetes Type 2  Father      Obesity Brother      Obesity Brother      Cerebrovascular Disease Daughter 40       Additional medical/Social/Surgical histories reviewed in [facility] and updated as appropriate.       PHYSICAL EXAM  Ht 1.727 m (5' 8\")   Wt 84.8 kg (187 lb)   BMI 28.43 kg/m      General  - normal appearance, in no obvious distress  Musculoskeletal - lumbar spine  - stance: mild slappage of left foot affecting left lower extremity  - inspection: normal bone and joint alignment, no obvious scoliosis  - palpation: bilateral lumbar paravertebral spasm  - ROM: pain with bilateral rotation, flexion past 30 deg  - strength: lower extremities 5/5 in all planes  - special tests:  (-) straight leg raise bilaterally  (-) slump test  Neuro  - no sensory deficit, 3-/5 ankle dorsiflexion 5/5 ankle plantarflexion 3-/5 hallux extension.  3+/5 ankle eversion, 5/5 ankle inversion, 5/5 knee extension and flexion.  Skin  - no ecchymosis, erythema, warmth, or induration, no obvious rash  Psych  - interactive, appropriate, normal mood and affect    IMAGING : XR lumbar 2V. Final results and radiologist's interpretation, available in the [facility] health record. Images were reviewed with the patient/family members in the office today. My personal interpretation of the performed imaging is arthritic changes of facet joints. L3-L4 moderate disc space narrowing     ASSESSMENT & PLAN  Mr. Willingham is a 66 year old year old male who presents to clinic today with acute left foot drop in the setting of low back pain. Concern for L5 nerve root impingement. XR with lumbar degenerative changes.    Diagnosis:   1. Foot drop acquired, left foot  2. Claustrophobia  3. S/p heart transplant  4. Lumbar DDD    -MRI lumbar spine without contrast  -Diazepam for claustrophobia during mri  -Continue PT for lower extremity tibialis " anterior and extensor hallucis strengthening  -Due to concurrent medications for heart transplant and DM2 on insulin will avoid NSAID course or oral corticosteroids.  -Follow up after MRI.    It was a pleasure seeing Jim today.    Nba Ag DO, CAQSM  Primary Care Sports Medicine

## 2021-10-06 ENCOUNTER — LAB REQUISITION (OUTPATIENT)
Dept: LAB | Facility: CLINIC | Age: 66
End: 2021-10-06
Payer: COMMERCIAL

## 2021-10-06 LAB
EBV DNA COPIES/ML, INSTRUMENT: 8217 COPIES/ML
EBV DNA SPEC NAA+PROBE-LOG#: 3.9 {LOG_COPIES}/ML
PATH REPORT.COMMENTS IMP SPEC: NORMAL
PATH REPORT.FINAL DX SPEC: NORMAL
PATH REPORT.GROSS SPEC: NORMAL
PATH REPORT.MICROSCOPIC SPEC OTHER STN: NORMAL
PATH REPORT.RELEVANT HX SPEC: NORMAL
PHOTO IMAGE: NORMAL

## 2021-10-07 ENCOUNTER — TELEPHONE (OUTPATIENT)
Dept: ORTHOPEDICS | Facility: CLINIC | Age: 66
End: 2021-10-07

## 2021-10-07 ENCOUNTER — LAB REQUISITION (OUTPATIENT)
Dept: LAB | Facility: CLINIC | Age: 66
End: 2021-10-07
Payer: COMMERCIAL

## 2021-10-07 DIAGNOSIS — Z48.21 ENCOUNTER FOR AFTERCARE FOLLOWING HEART TRANSPLANT (H): ICD-10-CM

## 2021-10-07 DIAGNOSIS — E11.22 TYPE 2 DIABETES MELLITUS WITH DIABETIC CHRONIC KIDNEY DISEASE (H): ICD-10-CM

## 2021-10-07 DIAGNOSIS — N18.9 CHRONIC KIDNEY DISEASE, UNSPECIFIED: ICD-10-CM

## 2021-10-07 LAB
ANION GAP SERPL CALCULATED.3IONS-SCNC: 3 MMOL/L (ref 3–14)
BASOPHILS # BLD AUTO: 0.1 10E3/UL (ref 0–0.2)
BASOPHILS NFR BLD AUTO: 1 %
BUN SERPL-MCNC: 38 MG/DL (ref 7–30)
CALCIUM SERPL-MCNC: 8.5 MG/DL (ref 8.5–10.1)
CHLORIDE BLD-SCNC: 110 MMOL/L (ref 94–109)
CO2 SERPL-SCNC: 21 MMOL/L (ref 20–32)
CREAT SERPL-MCNC: 2.52 MG/DL (ref 0.66–1.25)
DONOR IDENTIFICATION: NORMAL
DSA COMMENTS: NORMAL
DSA PRESENT: NO
DSA TEST METHOD: NORMAL
EOSINOPHIL # BLD AUTO: 0.2 10E3/UL (ref 0–0.7)
EOSINOPHIL NFR BLD AUTO: 3 %
ERYTHROCYTE [DISTWIDTH] IN BLOOD BY AUTOMATED COUNT: 18 % (ref 10–15)
GFR SERPL CREATININE-BSD FRML MDRD: 26 ML/MIN/1.73M2
GLUCOSE BLD-MCNC: 79 MG/DL (ref 70–99)
HCT VFR BLD AUTO: 31.9 % (ref 40–53)
HGB BLD-MCNC: 9.7 G/DL (ref 13.3–17.7)
IMM GRANULOCYTES # BLD: 0 10E3/UL
IMM GRANULOCYTES NFR BLD: 0 %
LYMPHOCYTES # BLD AUTO: 1.8 10E3/UL (ref 0.8–5.3)
LYMPHOCYTES NFR BLD AUTO: 30 %
MCH RBC QN AUTO: 28.8 PG (ref 26.5–33)
MCHC RBC AUTO-ENTMCNC: 30.4 G/DL (ref 31.5–36.5)
MCV RBC AUTO: 95 FL (ref 78–100)
MONOCYTES # BLD AUTO: 0.7 10E3/UL (ref 0–1.3)
MONOCYTES NFR BLD AUTO: 12 %
NEUTROPHILS # BLD AUTO: 3.2 10E3/UL (ref 1.6–8.3)
NEUTROPHILS NFR BLD AUTO: 54 %
NRBC # BLD AUTO: 0 10E3/UL
NRBC BLD AUTO-RTO: 0 /100
ORGAN: NORMAL
PLATELET # BLD AUTO: 306 10E3/UL (ref 150–450)
POTASSIUM BLD-SCNC: 5.5 MMOL/L (ref 3.4–5.3)
RBC # BLD AUTO: 3.37 10E6/UL (ref 4.4–5.9)
SA 1 CELL: NORMAL
SA 1 TEST METHOD: NORMAL
SA 2 CELL: NORMAL
SA 2 TEST METHOD: NORMAL
SA1 HI RISK ABY: NORMAL
SA1 MOD RISK ABY: NORMAL
SA2 HI RISK ABY: NORMAL
SA2 MOD RISK ABY: NORMAL
SODIUM SERPL-SCNC: 134 MMOL/L (ref 133–144)
TACROLIMUS BLD-MCNC: 9.2 UG/L (ref 5–15)
TME LAST DOSE: NORMAL H
TME LAST DOSE: NORMAL H
UNACCEPTABLE ANTIGENS: NORMAL
UNOS CPRA: 0
WBC # BLD AUTO: 6 10E3/UL (ref 4–11)
ZZZSA 1  COMMENTS: NORMAL
ZZZSA 2 COMMENTS: NORMAL

## 2021-10-07 PROCEDURE — 80197 ASSAY OF TACROLIMUS: CPT | Performed by: INTERNAL MEDICINE

## 2021-10-07 PROCEDURE — 85025 COMPLETE CBC W/AUTO DIFF WBC: CPT | Performed by: INTERNAL MEDICINE

## 2021-10-07 PROCEDURE — 80048 BASIC METABOLIC PNL TOTAL CA: CPT | Performed by: INTERNAL MEDICINE

## 2021-10-07 NOTE — TELEPHONE ENCOUNTER
[facility] Call Center    Phone Message    May a detailed message be left on voicemail: yes     Reason for Call: Other: [facility] stated that the MRI appointment scheduled for 10/12 will have to be cancelled. They got word back from the radiologist stating that there is still a pacing wire in the patient's chest from a heart transplant. The wire lights up like a Errol tree and that is not safe. Radiology prefers that patient stick to the CT (scheduled for 10/15). Imaging asked that someone reach out to the patient and let him know.     Action Taken: Message routed to:  [facility] ([facility] Sports Medicine    Travel Screening: Not Applicable

## 2021-10-08 ENCOUNTER — TELEPHONE (OUTPATIENT)
Dept: CARDIOLOGY | Facility: CLINIC | Age: 66
End: 2021-10-08

## 2021-10-08 ENCOUNTER — TELEPHONE (OUTPATIENT)
Dept: PHARMACY | Facility: CLINIC | Age: 66
End: 2021-10-08

## 2021-10-08 DIAGNOSIS — B44.9 ASPERGILLUS (H): Primary | ICD-10-CM

## 2021-10-08 DIAGNOSIS — Z94.1 S/P ORTHOTOPIC HEART TRANSPLANT (H): ICD-10-CM

## 2021-10-08 NOTE — TELEPHONE ENCOUNTER
Clinical Pharmacy Consult:                                                      Transplant Specific: 2 month post transplant medication review  Could not contact  Date of Transplant: 05/06/2021  Type of Transplant: heart  First Transplant: yes  History of rejection: no    Immunosuppression Regimen   TAC 1mg qAM & 1mg qPM and MMF 500mg qAM & 500mg qPM  Patient specific goal: 6-8  Most recent level: 9.2, date 10/7/21  Immunosuppressant Levels:  Supratherapeutic  Pt adherent to lab draws: yes  Scr:   Lab Results   Component Value Date    CR 2.52 10/07/2021    CR 2.75 07/11/2021     Side effects: Could not contact    Prophylactic Medications  Antibacterial:  Bactrim ss daily  Scheduled Discontinue Date: Therapy Complete    Antifungal: Not needed thus far  Scheduled Discontinue Date: N/A    Antiviral: CrCl 40 to 59 mL/minute: Valcyte 450 mg once daily   Scheduled Discontinue Date: Therapy Complete    Acid Reducer: Protonix (pantoprazole)  Scheduled Reviewed Date: managed by clinic    Thrombosis Prevention: Aspirin 81 mg PO daily  Scheduled Discontinue Date: managed by clinic    Blood Pressure Management  Frequency of home Blood Pressure checks: Could not contact  Most recent home BP:    Patient Blood pressure goal: <140/90  Patient blood pressure at goal:     Hospitalizations/ER visits since last assessment:        Med rec/DUR performed: yes  Med Rec Discrepancies: no    Could not contact, appears compliant, last tacro level high.    Robby Montiel RPH

## 2021-10-08 NOTE — TELEPHONE ENCOUNTER
AT called pt to inform him that he will be unable to have the MRI done due to the pacer wire. Pt had already been called and told the news. Pt had no further questions and thanked the AT for the call.     Jarvis Rosas MA, LAT, ATC

## 2021-10-08 NOTE — TELEPHONE ENCOUNTER
Labs reviewed by CN:      drink a lot more fluids   limit K in his diet,   and then repeat labs on Monday

## 2021-10-08 NOTE — RESULT ENCOUNTER NOTE
Repeat tac 9.2. Goal 6-8. Cr 2.5. Confirmed 12 hour trough. Current dose 1 mg bid. Decrease to 0.5 mg in am and 1 mg in pm. Repeat in 1 week.

## 2021-10-11 RX ORDER — PANTOPRAZOLE SODIUM 40 MG/1
TABLET, DELAYED RELEASE ORAL
Qty: 60 TABLET | Refills: 1 | Status: SHIPPED | OUTPATIENT
Start: 2021-10-11 | End: 2021-12-21

## 2021-10-12 ENCOUNTER — TELEPHONE (OUTPATIENT)
Dept: FAMILY MEDICINE | Facility: CLINIC | Age: 66
End: 2021-10-12

## 2021-10-12 ENCOUNTER — MEDICAL CORRESPONDENCE (OUTPATIENT)
Dept: HEALTH INFORMATION MANAGEMENT | Facility: CLINIC | Age: 66
End: 2021-10-12

## 2021-10-13 ENCOUNTER — LAB REQUISITION (OUTPATIENT)
Dept: LAB | Facility: CLINIC | Age: 66
End: 2021-10-13
Payer: COMMERCIAL

## 2021-10-13 DIAGNOSIS — Z94.1 TRANSPLANTED HEART (H): Primary | ICD-10-CM

## 2021-10-13 DIAGNOSIS — Z48.21 ENCOUNTER FOR AFTERCARE FOLLOWING HEART TRANSPLANT (H): ICD-10-CM

## 2021-10-13 LAB
ANION GAP SERPL CALCULATED.3IONS-SCNC: 3 MMOL/L (ref 3–14)
BUN SERPL-MCNC: 45 MG/DL (ref 7–30)
CALCIUM SERPL-MCNC: 8.6 MG/DL (ref 8.5–10.1)
CHLORIDE BLD-SCNC: 110 MMOL/L (ref 94–109)
CO2 SERPL-SCNC: 24 MMOL/L (ref 20–32)
CREAT SERPL-MCNC: 2.41 MG/DL (ref 0.66–1.25)
ERYTHROCYTE [DISTWIDTH] IN BLOOD BY AUTOMATED COUNT: 17 % (ref 10–15)
GFR SERPL CREATININE-BSD FRML MDRD: 27 ML/MIN/1.73M2
GLUCOSE BLD-MCNC: 81 MG/DL (ref 70–99)
HCT VFR BLD AUTO: 36.9 % (ref 40–53)
HGB BLD-MCNC: 11.2 G/DL (ref 13.3–17.7)
MCH RBC QN AUTO: 29.4 PG (ref 26.5–33)
MCHC RBC AUTO-ENTMCNC: 30.4 G/DL (ref 31.5–36.5)
MCV RBC AUTO: 97 FL (ref 78–100)
PLATELET # BLD AUTO: 347 10E3/UL (ref 150–450)
POTASSIUM BLD-SCNC: 4.9 MMOL/L (ref 3.4–5.3)
RBC # BLD AUTO: 3.81 10E6/UL (ref 4.4–5.9)
SODIUM SERPL-SCNC: 137 MMOL/L (ref 133–144)
TACROLIMUS BLD-MCNC: 9.6 UG/L (ref 5–15)
TME LAST DOSE: NORMAL H
TME LAST DOSE: NORMAL H
WBC # BLD AUTO: 4.8 10E3/UL (ref 4–11)

## 2021-10-13 PROCEDURE — 80048 BASIC METABOLIC PNL TOTAL CA: CPT | Performed by: INTERNAL MEDICINE

## 2021-10-13 PROCEDURE — 80197 ASSAY OF TACROLIMUS: CPT | Performed by: INTERNAL MEDICINE

## 2021-10-13 PROCEDURE — 85027 COMPLETE CBC AUTOMATED: CPT | Performed by: INTERNAL MEDICINE

## 2021-10-14 DIAGNOSIS — Z94.1 TRANSPLANTED HEART (H): Primary | ICD-10-CM

## 2021-10-14 DIAGNOSIS — Z94.1 S/P ORTHOTOPIC HEART TRANSPLANT (H): ICD-10-CM

## 2021-10-14 RX ORDER — TACROLIMUS 0.5 MG/1
0.5 CAPSULE ORAL EVERY EVENING
Qty: 90 CAPSULE | Refills: 3 | Status: SHIPPED | OUTPATIENT
Start: 2021-10-14 | End: 2021-10-21

## 2021-10-14 RX ORDER — TACROLIMUS 1 MG/1
CAPSULE ORAL
Qty: 900 CAPSULE | Refills: 11 | COMMUNITY
Start: 2021-10-14 | End: 2021-11-05

## 2021-10-14 NOTE — RESULT ENCOUNTER NOTE
Pt called to discuss tacro = 9.6. Goal 6-8. Recently decreased to 0.5/1 for a level of 9.2. Pt states he never made the above changes because he didn't have 0.5 mg tablets. Prescription sent. Pt to make changes above tonight. Recheck in 1 week.      Report given to Chasity Hill at this time. Care handed over.

## 2021-10-15 ENCOUNTER — TELEPHONE (OUTPATIENT)
Dept: TRANSPLANT | Facility: CLINIC | Age: 66
End: 2021-10-15

## 2021-10-15 ENCOUNTER — LAB (OUTPATIENT)
Dept: LAB | Facility: CLINIC | Age: 66
End: 2021-10-15

## 2021-10-15 ENCOUNTER — ANCILLARY PROCEDURE (OUTPATIENT)
Dept: CT IMAGING | Facility: CLINIC | Age: 66
End: 2021-10-15
Attending: INTERNAL MEDICINE
Payer: COMMERCIAL

## 2021-10-15 ENCOUNTER — TELEPHONE (OUTPATIENT)
Dept: ORTHOPEDICS | Facility: CLINIC | Age: 66
End: 2021-10-15

## 2021-10-15 DIAGNOSIS — Z94.1 TRANSPLANTED HEART (H): ICD-10-CM

## 2021-10-15 DIAGNOSIS — U07.1 CLINICAL DIAGNOSIS OF COVID-19: ICD-10-CM

## 2021-10-15 DIAGNOSIS — M21.372 ACQUIRED LEFT FOOT DROP: Primary | ICD-10-CM

## 2021-10-15 DIAGNOSIS — B44.9 ASPERGILLUS (H): ICD-10-CM

## 2021-10-15 LAB
ANION GAP SERPL CALCULATED.3IONS-SCNC: 2 MMOL/L (ref 3–14)
BUN SERPL-MCNC: 47 MG/DL (ref 7–30)
CALCIUM SERPL-MCNC: 8.8 MG/DL (ref 8.5–10.1)
CHLORIDE BLD-SCNC: 113 MMOL/L (ref 94–109)
CO2 SERPL-SCNC: 22 MMOL/L (ref 20–32)
CREAT SERPL-MCNC: 2.12 MG/DL (ref 0.66–1.25)
ERYTHROCYTE [DISTWIDTH] IN BLOOD BY AUTOMATED COUNT: 17 % (ref 10–15)
GFR SERPL CREATININE-BSD FRML MDRD: 32 ML/MIN/1.73M2
GLUCOSE BLD-MCNC: 111 MG/DL (ref 70–99)
HCT VFR BLD AUTO: 33.5 % (ref 40–53)
HGB BLD-MCNC: 10.1 G/DL (ref 13.3–17.7)
MCH RBC QN AUTO: 29.3 PG (ref 26.5–33)
MCHC RBC AUTO-ENTMCNC: 30.1 G/DL (ref 31.5–36.5)
MCV RBC AUTO: 97 FL (ref 78–100)
PLATELET # BLD AUTO: 267 10E3/UL (ref 150–450)
POTASSIUM BLD-SCNC: 5 MMOL/L (ref 3.4–5.3)
RBC # BLD AUTO: 3.45 10E6/UL (ref 4.4–5.9)
SODIUM SERPL-SCNC: 137 MMOL/L (ref 133–144)
TACROLIMUS BLD-MCNC: 8.3 UG/L (ref 5–15)
TME LAST DOSE: NORMAL H
TME LAST DOSE: NORMAL H
WBC # BLD AUTO: 4.5 10E3/UL (ref 4–11)

## 2021-10-15 PROCEDURE — 71250 CT THORAX DX C-: CPT | Mod: GC | Performed by: RADIOLOGY

## 2021-10-15 PROCEDURE — 85027 COMPLETE CBC AUTOMATED: CPT | Performed by: PATHOLOGY

## 2021-10-15 PROCEDURE — 36415 COLL VENOUS BLD VENIPUNCTURE: CPT | Performed by: PATHOLOGY

## 2021-10-15 PROCEDURE — 80197 ASSAY OF TACROLIMUS: CPT | Mod: 90 | Performed by: PATHOLOGY

## 2021-10-15 PROCEDURE — 80285 DRUG ASSAY VORICONAZOLE: CPT | Mod: 90 | Performed by: PATHOLOGY

## 2021-10-15 PROCEDURE — 99000 SPECIMEN HANDLING OFFICE-LAB: CPT | Performed by: PATHOLOGY

## 2021-10-15 PROCEDURE — 80048 BASIC METABOLIC PNL TOTAL CA: CPT | Performed by: PATHOLOGY

## 2021-10-15 NOTE — TELEPHONE ENCOUNTER
Pt had labs and CT done today. Vori level was ordered but lab missed drawing it. Lab called and they are adding it on.

## 2021-10-15 NOTE — ANESTHESIA POSTPROCEDURE EVALUATION
Patient: Jim Willingham    Procedure: Procedure(s):  Redo median sternotomy, lysis of adhesions, heart transplant recipient, on cardiopulmonary bypass, intraoperative transesophageal echocardiogram per anesthesia, Implantable Cardioverter Defibrillator (ICD) removal       Diagnosis:Myopathy [G72.9]  Diagnosis Additional Information: No value filed.    Anesthesia Type:  General    Note:  Disposition: Admission; ICU            ICU Sign Out: Anesthesiologist/ICU physician sign out WAS performed   Postop Pain Control: Uneventful            Sign Out: Well controlled pain   PONV: No   Neuro/Psych: Uneventful            Sign Out: PLANNED postop sedation   Airway/Respiratory: Uneventful            Sign Out: AIRWAY IN SITU/Resp. Support               Airway in situ/Resp. Support: ETT                 Reason: Planned Pre-op   CV/Hemodynamics: Uneventful            Sign Out: Acceptable CV status   Other NRE:    DID A NON-ROUTINE EVENT OCCUR?            Last vitals:  Vitals Value Taken Time   /88 10/05/21 1135   Temp 36.8  C (98.2  F) 09/27/21 1850   Pulse 102 10/05/21 1135   Resp 16 10/05/21 1135   SpO2 99 % 10/05/21 1135       Electronically Signed By: Corey Tamez MD  October 15, 2021  2:17 PM

## 2021-10-15 NOTE — TELEPHONE ENCOUNTER
Patient ID: Ambrocio is a 26 year old male.    Chief Complaint   Patient presents with   • MEDICATION ISSUE     pt here to discuss Zoloft medication     1) Anxiety - here for f/u.   2) Snoring with large tonsils  3) Hypertrophic tonsils  4) Tongue lesion - negative bx.       MEDICATION ISSUE   Associated symptoms include fatigue. Pertinent negatives include no rash.       Past Medical History:   Diagnosis Date   • Anxiety      History reviewed. No pertinent surgical history.  Family History   Problem Relation Age of Onset   • Diabetes Father      Social History     Socioeconomic History   • Marital status: Single     Spouse name: Not on file   • Number of children: Not on file   • Years of education: Not on file   • Highest education level: Not on file   Social Needs   • Financial resource strain: Not on file   • Food insecurity - worry: Not on file   • Food insecurity - inability: Not on file   • Transportation needs - medical: Not on file   • Transportation needs - non-medical: Not on file   Occupational History   • Not on file   Tobacco Use   • Smoking status: Never Smoker   • Smokeless tobacco: Never Used   Substance and Sexual Activity   • Alcohol use: Yes     Comment: social    • Drug use: Not on file   • Sexual activity: Not on file   Other Topics Concern   • Not on file   Social History Narrative   • Not on file     Current Outpatient Medications   Medication Sig Dispense Refill   • sertraline (ZOLOFT) 100 MG tablet Take 1 tablet by mouth daily. 90 tablet 1   • nystatin (MYCOSTATIN) 129840 UNIT/GM powder APPLY TO AFFECTED AREA TWICE A DAY     • hydroCORTisone (CORTIZONE) 2.5 % ointment 1 application by Extracorporeal route every 12 hours.       No current facility-administered medications for this visit.      ALLERGIES:   Allergen Reactions   • Ceftriaxone Other (See Comments)         Review of Systems   Constitutional: Positive for fatigue.   Respiratory: Negative.    Cardiovascular: Negative.   I spoke with the patient regarding his upcoming appointment with Dr. Ag and I let him know that since he was unable to complete the MRI we would like him to get a CT scan. The patient stated that he just had a CT scan this morning however it was a chest CT and we will need to get a lumbar CT. The patient understood and said that he would call to get the CT and would try to get it prior to Tuesday's appointment, if he is unable to get it scheduled and completed prior to Tuesday he was instructed to call to reschedule the follow up with Dr. Ag if he is unable to get the CT. He had no further questions at this time.     JERRY Jorgensen     Gastrointestinal: Negative.    Genitourinary: Negative.    Skin: Negative for rash.   Neurological: Negative.    Psychiatric/Behavioral: The patient is nervous/anxious.         Vitals:    02/04/19 1058   BP: 125/82   Pulse: 100   SpO2: 98%   Weight: 122 kg (269 lb 1.1 oz)   Height: 5' 9\" (1.753 m)       Physical Exam   Constitutional: He is oriented to person, place, and time. No distress.   HENT:   Right Ear: External ear normal.   Left Ear: External ear normal.   Nose: Nose normal.   Mouth/Throat: Oropharynx is clear and moist. No oropharyngeal exudate.   Hypertrophic noninfected tonsils   Eyes: Conjunctivae and EOM are normal. Pupils are equal, round, and reactive to light. Right eye exhibits no discharge. Left eye exhibits no discharge. No scleral icterus.   Neck: Normal range of motion. No JVD present. No thyromegaly present.   Cardiovascular: Normal rate, regular rhythm, normal heart sounds and intact distal pulses. Exam reveals no friction rub.   No murmur heard.  Pulmonary/Chest: Effort normal and breath sounds normal. No stridor. No respiratory distress. He has no wheezes. He has no rales. He exhibits no tenderness.   Abdominal: Soft. Bowel sounds are normal. He exhibits no distension and no mass. There is no tenderness. There is no rebound and no guarding.   Musculoskeletal: He exhibits no edema, tenderness or deformity.   Lymphadenopathy:     He has no cervical adenopathy.   Neurological: He is alert and oriented to person, place, and time. He has normal reflexes. No cranial nerve deficit. Coordination normal.   Skin: Skin is warm. No rash noted. He is not diaphoretic. No erythema. No pallor.   Psychiatric: He has a normal mood and affect. His behavior is normal. Judgment and thought content normal.       Assessment/Plan:   Problem List Items Addressed This Visit        Behavioral    Generalized anxiety disorder - Primary     Highly anxious person.  Was restarted on Zoloft 50 mg last visit and was told  to increase to 100 mg after 1 week (see last note).   Pt currently on 100 mg for past 10 days.  Patient had a panic attack last night, still obsessed with these occasional oral lesions that he is seeing.  Follow-up in 1 month.            Respiratory    Hypertrophy of tonsils alone     Sleep study positive for mild sleep apnea with hypertrophic tonsils.  Most likely will need tonsillectomy.  Referral given to ENT, will follow up with his ENT physician.         Relevant Orders    SERVICE TO ENT    Snoring     Sleep study positive for mild sleep apnea with hypertrophic tonsils.  Most likely will need tonsillectomy.  Referral given to ENT, will follow up with his ENT physician.         Relevant Orders    SERVICE TO ENT    SASHA (obstructive sleep apnea)     Sleep study positive for mild sleep apnea with hypertrophic tonsils.  Most likely will need tonsillectomy.  Referral given to ENT, will follow up with his ENT physician.         Relevant Orders    SERVICE TO ENT      Path report reviewed from tongue biopsy, negative.            Adilson Arias, DO

## 2021-10-18 ENCOUNTER — ANCILLARY PROCEDURE (OUTPATIENT)
Dept: CT IMAGING | Facility: CLINIC | Age: 66
End: 2021-10-18
Attending: FAMILY MEDICINE
Payer: COMMERCIAL

## 2021-10-18 DIAGNOSIS — M21.372 ACQUIRED LEFT FOOT DROP: ICD-10-CM

## 2021-10-18 PROCEDURE — 72131 CT LUMBAR SPINE W/O DYE: CPT | Performed by: RADIOLOGY

## 2021-10-18 NOTE — RESULT ENCOUNTER NOTE
Tacro 8.3. Goal 6-8. Decrease tacro to 1 mg in am and 0.5 mg in evening. Repeat in 1 week. Cr trending downward at 2.1.

## 2021-10-19 LAB — VORICONAZOLE SERPL-MCNC: 2.3 UG/ML (ref 1–5.5)

## 2021-10-20 ENCOUNTER — LAB REQUISITION (OUTPATIENT)
Dept: LAB | Facility: CLINIC | Age: 66
End: 2021-10-20
Payer: COMMERCIAL

## 2021-10-20 DIAGNOSIS — Z48.21 ENCOUNTER FOR AFTERCARE FOLLOWING HEART TRANSPLANT (H): ICD-10-CM

## 2021-10-20 LAB
ANION GAP SERPL CALCULATED.3IONS-SCNC: 4 MMOL/L (ref 3–14)
BUN SERPL-MCNC: 32 MG/DL (ref 7–30)
CALCIUM SERPL-MCNC: 8.3 MG/DL (ref 8.5–10.1)
CHLORIDE BLD-SCNC: 111 MMOL/L (ref 94–109)
CO2 SERPL-SCNC: 21 MMOL/L (ref 20–32)
CREAT SERPL-MCNC: 1.66 MG/DL (ref 0.66–1.25)
ERYTHROCYTE [DISTWIDTH] IN BLOOD BY AUTOMATED COUNT: 16.7 % (ref 10–15)
GFR SERPL CREATININE-BSD FRML MDRD: 43 ML/MIN/1.73M2
GLUCOSE BLD-MCNC: 161 MG/DL (ref 70–99)
HCT VFR BLD AUTO: 32.9 % (ref 40–53)
HGB BLD-MCNC: 10.1 G/DL (ref 13.3–17.7)
MCH RBC QN AUTO: 29.4 PG (ref 26.5–33)
MCHC RBC AUTO-ENTMCNC: 30.7 G/DL (ref 31.5–36.5)
MCV RBC AUTO: 96 FL (ref 78–100)
PLATELET # BLD AUTO: 288 10E3/UL (ref 150–450)
POTASSIUM BLD-SCNC: 4.5 MMOL/L (ref 3.4–5.3)
RBC # BLD AUTO: 3.44 10E6/UL (ref 4.4–5.9)
SODIUM SERPL-SCNC: 136 MMOL/L (ref 133–144)
TACROLIMUS BLD-MCNC: <3 UG/L (ref 5–15)
TME LAST DOSE: ABNORMAL H
TME LAST DOSE: ABNORMAL H
WBC # BLD AUTO: 4.9 10E3/UL (ref 4–11)

## 2021-10-20 PROCEDURE — 85027 COMPLETE CBC AUTOMATED: CPT | Performed by: INTERNAL MEDICINE

## 2021-10-20 PROCEDURE — 80048 BASIC METABOLIC PNL TOTAL CA: CPT | Performed by: INTERNAL MEDICINE

## 2021-10-20 PROCEDURE — 80197 ASSAY OF TACROLIMUS: CPT | Performed by: INTERNAL MEDICINE

## 2021-10-21 DIAGNOSIS — Z94.1 TRANSPLANTED HEART (H): ICD-10-CM

## 2021-10-21 DIAGNOSIS — B44.1 PULMONARY ASPERGILLOSIS (H): ICD-10-CM

## 2021-10-21 RX ORDER — TACROLIMUS 0.5 MG/1
1.5 CAPSULE ORAL EVERY EVENING
Qty: 270 CAPSULE | Refills: 3 | COMMUNITY
Start: 2021-10-21 | End: 2021-11-05

## 2021-10-21 NOTE — RESULT ENCOUNTER NOTE
Tac level <3. Confirmed 12 hour trough. No changes in vori. Current dose 1mg/ 0.5.. Cr 1.6. instructions given to increase to 1 mg and 1.5 mg in evening. Repeat 10/27.

## 2021-10-22 ENCOUNTER — VIRTUAL VISIT (OUTPATIENT)
Dept: ORTHOPEDICS | Facility: CLINIC | Age: 66
End: 2021-10-22
Payer: COMMERCIAL

## 2021-10-22 DIAGNOSIS — M21.372 ACQUIRED LEFT FOOT DROP: Primary | ICD-10-CM

## 2021-10-22 PROCEDURE — 99213 OFFICE O/P EST LOW 20 MIN: CPT | Mod: TEL | Performed by: FAMILY MEDICINE

## 2021-10-22 RX ORDER — VORICONAZOLE 200 MG/1
300 TABLET, FILM COATED ORAL 2 TIMES DAILY
Qty: 90 TABLET | Refills: 1 | Status: SHIPPED | OUTPATIENT
Start: 2021-10-22 | End: 2021-12-11

## 2021-10-22 NOTE — LETTER
10/22/2021       RE: Jim Willingham  [address] [place] [address]     Dear Colleague,    Thank you for referring your patient, Jim Willingham, to the [facility] [place] at [facility]. Please see a copy of my visit note below.    ESTABLISHED PATIENT FOLLOW-UP VIRTUAL:  No chief complaint on file.       This encounter was conducted via telephone in lieu of face-to-face encounter due to precautions implemented during COVID-19 pandemic.     What phone number would you like to be contacted at? [phone]  How would you like to obtain your AVS? MyChart      HISTORY OF PRESENT ILLNESS  Mr. Willingham is a pleasant 66 year old year old male who presents via telephone today for follow-up of foot drop/low back Sx.    Most Sx have remained the same, but his foot Sx have improved.    Date of injury: ~8/2021  Date last seen: 10/5/21  Following Therapeutic Plan: yes  Pain: slight improvement  Function: Improved gait and notes that his foot no longer drops or slaps the ground when he walks.  He states that he has improved great toe extension and function however his ankle remains weak.  He had been working with his PT and cardiac rehab and this is ending.  He is hoping to consider dedicated physical therapy for lower extremity.    No significant pain.    Additional medical/Social/Surgical histories reviewed in [facility] and updated as appropriate.    REVIEW OF SYSTEMS (10/22/2021)  CONSTITUTIONAL: Denies fever and weight loss  GASTROINTESTINAL: Denies abdominal pain, nausea, vomiting  MUSCULOSKELETAL: See HPI  SKIN: Denies any recent rash or lesion  NEUROLOGICAL: Denies numbness or focal weakness    IMAGING :   CT LUMBAR SPINE W/O CONTRAST 10/18/2021 3:38 PM     History: Lumbar radiculopathy, no red flags; Unable to obtain MRI due  to metallic pacer lead.  Foot drop acquired.; Acquired left foot drop.  ICD-10: Acquired left foot  drop     Comparison: None available.     Technique: Using multidetector thin collimation helical acquisition  technique, axial, coronal and sagittal CT images through the lumbar  spine were obtained without intravenous contrast.      Findings: There are 5 lumbar type vertebrae. Regarding alignment, the  lumbar spine alignment appears preserved. There is mild disc space  narrowing at at L2-3, L3-4, and L4-5. No definite lesions are noted  within the vertebra. Schmorl's nodules in the endplates at L3-4.  Findings on a level by level basis are as follows:     L1-L2: Minimal broad-based posterior disc bulge but no significant  spinal canal stenosis or neural foraminal narrowing.. Bilateral facet  arthropathy.     L2-L3: Disc bulge with facet arthropathy and thickening of the  ligamentum flavum without significant spinal canal stenosis. The  neural foramen are mildly to moderately narrowed bilaterally..     L3-L4: Circumferential disc bulge with faint annular calcification  with facet arthropathy and thickening of ligamentum flavum without  significant spinal canal stenosis.. Mild-to-moderate bilateral  foraminal narrowing.     L4-L5: Circumferential disc bulge with faint annular calcification  with facet arthropathy and thickening of ligamentum flavum but no  spinal canal stenosis. The neural foramen are mildly narrowed  bilaterally..     L5-S1: Disc bulge with facet arthropathy but no spinal canal stenosis.  Neural foramen are mildly narrowed bilaterally..     The visualized adjacent paraspinous tissues are grossly within normal  limits. Aortoiliac calcifications.                                                                      Impression:   Multilevel lumbar spondylosis without significant spinal canal  stenosis. Multilevel neural foraminal narrowings as detailed above..     JEWELS HORTON MD      ASSESSMENT & PLAN  Mr. Willingham is a 66 year old year old male with past medical history of heart transplant presenting  to discuss CT lumbar results regarding left foot drop.    Foot drop improving to some degree, no longer experiencing slappage type gait.  Worked with PT in cardiac rehab now ending. CT without significant stenosis or cause for foot drop.     Diagnosis: Acquired left foot drop, lumbar degenerative disc disease.    I have recommended we proceed with an EMG today to further look into peripheral nerve impingement.  I provided an order for formal physical therapy so they can focus solely on his lower extremity strength and consider peroneal nerve gliding.  I would also like to see him in clinic after EMG is complete so that we complete manual muscle testing and review progress.    List he understands and I will see him back hopefully in the next 2 to 3 weeks.    It was a pleasure seeing Jmi.    Total Telephone Time: 14 min  Nba Ag DO, CAQSM  Primary Care Sports Medicine  Department of Orthopedic Surgery  [facility]

## 2021-10-22 NOTE — NURSING NOTE
Provided EMG, Sports, and RAJAT scheduling numbers. Pt understands his instructions and will follow-up with Dr. Ag after EMG.     - Emmanuel Drake, ATC

## 2021-10-22 NOTE — LETTER
10/22/2021      RE: Jim Willingham  [address]  [place] [address]       ESTABLISHED PATIENT FOLLOW-UP VIRTUAL:  No chief complaint on file.       This encounter was conducted via telephone in lieu of face-to-face encounter due to precautions implemented during COVID-19 pandemic.     What phone number would you like to be contacted at? [phone]  How would you like to obtain your AVS? MyChart      HISTORY OF PRESENT ILLNESS  Mr. Willingham is a pleasant 66 year old year old male who presents via telephone today for follow-up of foot drop/low back Sx.    Most Sx have remained the same, but his foot Sx have improved.    Date of injury: ~8/2021  Date last seen: 10/5/21  Following Therapeutic Plan: yes  Pain: slight improvement  Function: Improved gait and notes that his foot no longer drops or slaps the ground when he walks.  He states that he has improved great toe extension and function however his ankle remains weak.  He had been working with his PT and cardiac rehab and this is ending.  He is hoping to consider dedicated physical therapy for lower extremity.    No significant pain.    Additional medical/Social/Surgical histories reviewed in [facility] and updated as appropriate.    REVIEW OF SYSTEMS (10/22/2021)  CONSTITUTIONAL: Denies fever and weight loss  GASTROINTESTINAL: Denies abdominal pain, nausea, vomiting  MUSCULOSKELETAL: See HPI  SKIN: Denies any recent rash or lesion  NEUROLOGICAL: Denies numbness or focal weakness    IMAGING :   CT LUMBAR SPINE W/O CONTRAST 10/18/2021 3:38 PM     History: Lumbar radiculopathy, no red flags; Unable to obtain MRI due  to metallic pacer lead.  Foot drop acquired.; Acquired left foot drop.  ICD-10: Acquired left foot drop     Comparison: None available.     Technique: Using multidetector thin collimation helical acquisition  technique, axial, coronal and sagittal CT images through the lumbar  spine were obtained without intravenous contrast.      Findings: There are 5  lumbar type vertebrae. Regarding alignment, the  lumbar spine alignment appears preserved. There is mild disc space  narrowing at at L2-3, L3-4, and L4-5. No definite lesions are noted  within the vertebra. Schmorl's nodules in the endplates at L3-4.  Findings on a level by level basis are as follows:     L1-L2: Minimal broad-based posterior disc bulge but no significant  spinal canal stenosis or neural foraminal narrowing.. Bilateral facet  arthropathy.     L2-L3: Disc bulge with facet arthropathy and thickening of the  ligamentum flavum without significant spinal canal stenosis. The  neural foramen are mildly to moderately narrowed bilaterally..     L3-L4: Circumferential disc bulge with faint annular calcification  with facet arthropathy and thickening of ligamentum flavum without  significant spinal canal stenosis.. Mild-to-moderate bilateral  foraminal narrowing.     L4-L5: Circumferential disc bulge with faint annular calcification  with facet arthropathy and thickening of ligamentum flavum but no  spinal canal stenosis. The neural foramen are mildly narrowed  bilaterally..     L5-S1: Disc bulge with facet arthropathy but no spinal canal stenosis.  Neural foramen are mildly narrowed bilaterally..     The visualized adjacent paraspinous tissues are grossly within normal  limits. Aortoiliac calcifications.                                                                      Impression:   Multilevel lumbar spondylosis without significant spinal canal  stenosis. Multilevel neural foraminal narrowings as detailed above..     JEWELS HORTON MD      ASSESSMENT & PLAN  Mr. Willingham is a 66 year old year old male with past medical history of heart transplant presenting to discuss CT lumbar results regarding left foot drop.    Foot drop improving to some degree, no longer experiencing slappage type gait.  Worked with PT in cardiac rehab now ending. CT without significant stenosis or cause for foot drop.     Diagnosis:  Acquired left foot drop, lumbar degenerative disc disease.    I have recommended we proceed with an EMG today to further look into peripheral nerve impingement.  I provided an order for formal physical therapy so they can focus solely on his lower extremity strength and consider peroneal nerve gliding.  I would also like to see him in clinic after EMG is complete so that we complete manual muscle testing and review progress.    List he understands and I will see him back hopefully in the next 2 to 3 weeks.    It was a pleasure seeing Jim.    Total Telephone Time: 14 min  Nba Ag DO, [facility]  Primary Care Sports Medicine  Department of Orthopedic Surgery  [facility]          Nba gA DO

## 2021-10-22 NOTE — PROGRESS NOTES
ESTABLISHED PATIENT FOLLOW-UP VIRTUAL:  No chief complaint on file.       This encounter was conducted via telephone in lieu of face-to-face encounter due to precautions implemented during COVID-19 pandemic.     What phone number would you like to be contacted at? [phone]  How would you like to obtain your AVS? MyChart      HISTORY OF PRESENT ILLNESS  Mr. Willingham is a pleasant 66 year old year old male who presents via telephone today for follow-up of foot drop/low back Sx.    Most Sx have remained the same, but his foot Sx have improved.    Date of injury: ~8/2021  Date last seen: 10/5/21  Following Therapeutic Plan: yes  Pain: slight improvement  Function: Improved gait and notes that his foot no longer drops or slaps the ground when he walks.  He states that he has improved great toe extension and function however his ankle remains weak.  He had been working with his PT and cardiac rehab and this is ending.  He is hoping to consider dedicated physical therapy for lower extremity.    No significant pain.    Additional medical/Social/Surgical histories reviewed in [facility] and updated as appropriate.    REVIEW OF SYSTEMS (10/22/2021)  CONSTITUTIONAL: Denies fever and weight loss  GASTROINTESTINAL: Denies abdominal pain, nausea, vomiting  MUSCULOSKELETAL: See HPI  SKIN: Denies any recent rash or lesion  NEUROLOGICAL: Denies numbness or focal weakness    IMAGING :   CT LUMBAR SPINE W/O CONTRAST 10/18/2021 3:38 PM     History: Lumbar radiculopathy, no red flags; Unable to obtain MRI due  to metallic pacer lead.  Foot drop acquired.; Acquired left foot drop.  ICD-10: Acquired left foot drop     Comparison: None available.     Technique: Using multidetector thin collimation helical acquisition  technique, axial, coronal and sagittal CT images through the lumbar  spine were obtained without intravenous contrast.      Findings: There are 5 lumbar type vertebrae. Regarding alignment, the  lumbar spine alignment appears  preserved. There is mild disc space  narrowing at at L2-3, L3-4, and L4-5. No definite lesions are noted  within the vertebra. Schmorl's nodules in the endplates at L3-4.  Findings on a level by level basis are as follows:     L1-L2: Minimal broad-based posterior disc bulge but no significant  spinal canal stenosis or neural foraminal narrowing.. Bilateral facet  arthropathy.     L2-L3: Disc bulge with facet arthropathy and thickening of the  ligamentum flavum without significant spinal canal stenosis. The  neural foramen are mildly to moderately narrowed bilaterally..     L3-L4: Circumferential disc bulge with faint annular calcification  with facet arthropathy and thickening of ligamentum flavum without  significant spinal canal stenosis.. Mild-to-moderate bilateral  foraminal narrowing.     L4-L5: Circumferential disc bulge with faint annular calcification  with facet arthropathy and thickening of ligamentum flavum but no  spinal canal stenosis. The neural foramen are mildly narrowed  bilaterally..     L5-S1: Disc bulge with facet arthropathy but no spinal canal stenosis.  Neural foramen are mildly narrowed bilaterally..     The visualized adjacent paraspinous tissues are grossly within normal  limits. Aortoiliac calcifications.                                                                      Impression:   Multilevel lumbar spondylosis without significant spinal canal  stenosis. Multilevel neural foraminal narrowings as detailed above..     JEWELS HORTON MD      ASSESSMENT & PLAN  Mr. Willingham is a 66 year old year old male with past medical history of heart transplant presenting to discuss CT lumbar results regarding left foot drop.    Foot drop improving to some degree, no longer experiencing slappage type gait.  Worked with PT in cardiac rehab now ending. CT without significant stenosis or cause for foot drop.     Diagnosis: Acquired left foot drop, lumbar degenerative disc disease.    I have recommended we  proceed with an EMG today to further look into peripheral nerve impingement.  I provided an order for formal physical therapy so they can focus solely on his lower extremity strength and consider peroneal nerve gliding.  I would also like to see him in clinic after EMG is complete so that we complete manual muscle testing and review progress.    List he understands and I will see him back hopefully in the next 2 to 3 weeks.    It was a pleasure seeing Jim.    Total Telephone Time: 14 min  Nba Ag DO, TRICIA  Primary Care Sports Medicine  Department of Orthopedic Surgery  [facility]

## 2021-10-23 ENCOUNTER — HEALTH MAINTENANCE LETTER (OUTPATIENT)
Age: 66
End: 2021-10-23

## 2021-10-25 ENCOUNTER — VIRTUAL VISIT (OUTPATIENT)
Dept: INFECTIOUS DISEASES | Facility: CLINIC | Age: 66
End: 2021-10-25
Attending: STUDENT IN AN ORGANIZED HEALTH CARE EDUCATION/TRAINING PROGRAM
Payer: COMMERCIAL

## 2021-10-25 ENCOUNTER — TELEPHONE (OUTPATIENT)
Dept: CARDIOLOGY | Facility: CLINIC | Age: 66
End: 2021-10-25

## 2021-10-25 DIAGNOSIS — B44.9 ASPERGILLUS (H): Primary | ICD-10-CM

## 2021-10-25 DIAGNOSIS — Z94.1 TRANSPLANTED HEART (H): Primary | ICD-10-CM

## 2021-10-25 DIAGNOSIS — Z94.1 STATUS POST HEART TRANSPLANTATION (H): ICD-10-CM

## 2021-10-25 DIAGNOSIS — Z94.1 S/P ORTHOTOPIC HEART TRANSPLANT (H): ICD-10-CM

## 2021-10-25 DIAGNOSIS — Z23 NEED FOR VACCINATION: ICD-10-CM

## 2021-10-25 DIAGNOSIS — D84.9 IMMUNOSUPPRESSED STATUS (H): ICD-10-CM

## 2021-10-25 DIAGNOSIS — Z86.16 HISTORY OF COVID-19: ICD-10-CM

## 2021-10-25 DIAGNOSIS — J15.211: ICD-10-CM

## 2021-10-25 PROCEDURE — 99215 OFFICE O/P EST HI 40 MIN: CPT | Mod: GT | Performed by: STUDENT IN AN ORGANIZED HEALTH CARE EDUCATION/TRAINING PROGRAM

## 2021-10-25 RX ORDER — MYCOPHENOLATE MOFETIL 250 MG/1
500 CAPSULE ORAL 2 TIMES DAILY
Qty: 120 CAPSULE | Refills: 3 | Status: SHIPPED | OUTPATIENT
Start: 2021-10-25 | End: 2022-02-21

## 2021-10-25 ASSESSMENT — PAIN SCALES - GENERAL: PAINLEVEL: NO PAIN (0)

## 2021-10-25 NOTE — TELEPHONE ENCOUNTER
Pt saw ID. Will continue Vori for 1 month. Prescription to be sent to patient by [facility] pharmacy this evening.     Importance of not  missing meds was discussed. On call coordinator number reviewed. Pt restarted Vori today. Repeat tac level on Wednesday.     Pt verbalized understanding.

## 2021-10-25 NOTE — PROGRESS NOTES
Jim is a 66 year old who is being evaluated via a billable video visit.      How would you like to obtain your AVS? MyChart  If the video visit is dropped, the invitation should be resent by: Text to cell phone: [phone]  Will anyone else be joining your video visit? No      Video Start Time: 3.14 pm  Video-Visit Details    Type of service:  Video Visit    Video End Time:3:43 PM    Originating Location (pt. Location): Home    Distant Location (provider location):  [facility] INFECTI[facility] DISE[facility]IN[facility] [place]     Platform used for Video Visit: [employer]     Total time including chart review, care-coordination and documentation time on the date of encounter - 60 mins      [facility]  Transplant Infectious Disease Progress Note     Patient:  Jim Willingham, Date of birth 1955, Medical record number [id number]  Date of Visit:  10/25/2021         Assessment and Recommendations:     66 year old male with NICMP s/p LVAD in 2017 and OHT in 5/2021 who presented with 2-3 day history of fever and SOB and found to be COVID 19 pos with KALI consolidative and cavitartion.      1. Mild to moderate KALI cavitary consolidation: tested positive for COVID on 8/24. Received monoclonal antibodies and 5 days of remdesivir.   Serum Asp gm and 1,3 BD glucan, CRAG, MRSA nares negative. 8/30 BAL Asp gm negative. Bronchoscopy culture has grown Staphylococcus aureus and Candida. He was dced on augmentin for the MSSA. After discharge aspergillus fumigatus grew in culture, so he was started on voriconazole 300 mg bid 33 days ago and has tolerated it well. He ran out of it 3 days ago but is supposed to get a refill today.     In retrospect I think he had COVID pneumonia with possibly secondary bacterial infection with MSSA based on clinical presentation, CT findings, response to treatment clinically and radiologically. I dont think he had aspergillus pneumonia. However recovery of aspergillus  (colonization) in a heart transplant recipient less than 1 year out of transplant is a risk factor for invasive aspergillosis and also in covid positive patients. Therefore even though I dont think he had aspergillus pneumonia I will continue aspergillus therapy to complete a 2 months duration to prevent invasive disease.       Previous ID problems:  1. RUPINDER hominis BSI in 9/2020 treated with vancomycin for 6 weeks, presumed to be LVAD related.  2. MRSE in a hand joint in broth only deemed a contaminant on 7/8/2021 with inflamed joint due to acute pseudogout.   3. RLL pneumonia with exudative pleural effusion in 7/2021. The effusion was negative for fungal/bacterial/AFB cx. Treated with IV ABx then levaquin. Urine and serum Blasto and Histo Ag were negative, A galactomannan was negative for BD glucan was intermediate. No antifungal therapy was given with repeat CT 7/23/2021 showed improvement.  4. H influenzae pneumonia (1/2020)    Other Infectious Diseases Concerns Include:  - QTc interval: QTc 420  - Bacterial prophylaxis: none   - PCP prophylaxis:pentamidine 8/11, stopped in Sep ? As 6 months completed and did not tolerate it well with SOB   - Viral serostatus & prophylaxis:CMV D+/R+, EBV D+/R+, HSV1+/2-, VZV +, Toxo D-/R-  - Fungal prophylaxis: on treatment  - Immunization status: candidate for prevnar then the pneumovax vaccines, shingrix vaccine and the COVID-19 vaccine as he is now 6 months out of vaccine   - Immunoglobulin status: IgG 838  - Immunosuppression: Basiliximab for induction. TAC/MMF     Recommendations:  - continue voriconazole 300 mg po bid for 1 more month to complete a 2 month therapy  - patient to inform heart transplant coordinator when he is to be done with vori, so that tac dose can be adjusted  - recommend Flu, covid, pneumovax, shingrix vaccinations since he is 6 months out of transplant and is on lower immunosuppression.     Anisa Quiñones  , [employer]   [place]  Division of Infectious Disease, Department of Internal Medicine  Pager [phone]          Interval History:     This is a follow up after hospital discharge. First time seeing patient. Seen by my colleague as an inpatient.   Briefly HPI  66 year old male with NICMP s/p LVAD in 2017 and OHT in 5/2021 on tacrolimus, cellcept 500 mg bid currently who presented on 8.28 with sudden onset of SOB and febrile for 2-3 days. He was found to be covid positive, CT chest showed left lung consolidation with some cavitation. He underwent bronch and cx grew MSSA. He was initially on zosyn and transitioned to augmentin at the time of discharge. He rcvd monoclonal antibodies x1  and 5 days of remdesivir. He reports that SOB resolved soon after receiving nebs in the ER. 8/28 TTE w/o significant valvular abnormalities making septic emboli less likely. After discharge bronch results came back positive for aspergillus fumigatus. Therefore he was started on voriconazole 300 mg bid about a 33 days ago. He tolerated it well and ran out of it 3 days ago and pharmacy has promised to refill it today.     He had a follow up CT chest 10/15 and that shows almost complete resolution of the cavitary consolidation. vori level has been good.    Ref. Range 9/21/2021 08:21 10/15/2021 12:26   Voriconazole Latest Ref Range: 1.0 - 5.5 ug/mL 5.2 2.3         Transplants:  5/6/2021 (Heart), Postoperative day:  172.  Coordinator Yolette Rueda        Allergies   Allergen Reactions     Grass Shortness Of Breath     Ace Inhibitors Cough     Cats      Dust Mites Other (See Comments)     Asthma     Mold Other (See Comments)     Asthma     Penicillins Other (See Comments)     Unknown - childhood exposure    Tolerated Zosyn 9/18-9/20/2020    Per patient report he has tolerated amoxicillin     Sulfa Drugs Other (See Comments) and Unknown     Unknown childhood reaction            Physical Exam:   Unable to examine due to virtual visit           Laboratory Data:     Metabolic Studies       Recent Labs   Lab Test 10/20/21  0835 10/15/21  1227 10/15/21  1227 10/07/21  0920 10/05/21  0820 09/24/21  0801 09/21/21  0821 08/25/21  1645 08/25/21  1427 08/24/21  1758 08/24/21  0613 08/24/21  0559 08/23/21  0918 07/09/21  0632 07/08/21  1533 05/05/21  0628 05/04/21  2313     --  137   < > 138   < > 144   < >  --    < > 136  --    < >   < > 134   < > 135   POTASSIUM 4.5  --  5.0   < > 5.1   < > 3.7   < >  --    < > 4.1  --    < >   < > 4.6   < > 4.0   CHLORIDE 111*  --  113*   < > 108   < > 114*   < >  --    < > 106  --    < >   < > 104   < > 101   CO2 21  --  22   < > 22   < > 21   < >  --    < > 26  --    < >   < > 22   < > 26   ANIONGAP 4   < > 2*   < > 8   < > 9   < >  --    < > 4  --    < >   < > 7   < > 7   BUN 32*   < > 47*   < > 37*   < > 29   < >  --    < > 32*  --    < >   < > 37*   < > 50*   CR 1.66*  --  2.12*   < > 2.11*   < > 2.39*   < >  --    < > 1.91*  --    < >   < > 2.37*   < > 1.60*   GFRESTIMATED 43*   < > 32*   < > 32*   < > 28*   < >  --    < > 36*  --    < >   < > 28*   < > 45*   *   < > 111*   < > 89   < > 151*   < >  --    < > 192*  --    < >   < > 133*   < > 115*   A1C  --   --   --   --   --   --   --   --   --   --   --   --   --   --   --   --  5.1   QAMAR 8.3*  --  8.8   < > 9.1   < > 8.4*   < >  --    < > 8.0*  --    < >   < > 7.9*   < > 9.1   PHOS  --   --   --   --  3.8   < > 2.9   < >  --   --   --   --   --    < >  --    < > 3.8   MAG  --   --   --   --  1.8   < > 2.0   < >  --    < > 1.5*  --   --    < >  --    < > 2.3   LACT  --   --   --   --   --   --   --   --   --   --   --  0.8  --   --  1.0   < >  --    PCAL  --   --   --   --   --   --   --   --   --   --  0.16  --   --   --  0.18   < >  --    FGTL  --   --   --   --   --   --   --   --  <31  --   --   --   --    < >  --    < >  --    CKT  --   --   --   --  44  --  57  --   --   --   --   --   --   --   --    < >  --     < > = values in this interval not  displayed.       Hepatic Studies    Recent Labs   Lab Test 09/21/21  0821 08/28/21  0633 08/24/21  0613 08/24/21  0613 07/14/21  0554 07/13/21 2053 07/12/21  1115 07/10/21  0624   BILITOTAL 0.3 0.3  --  0.6   < >  --    < >  --    DBIL  --  0.1  --   --    < >  --    < >  --    ALKPHOS 122 77  --  122   < >  --    < >  --    PROTTOTAL 6.4* 5.4*   < > 6.2*   < >  --    < > 5.3*   ALBUMIN 2.8* 1.9*  --  2.6*   < >  --    < >  --    AST 19 11  --  10   < >  --    < >  --    ALT 16 10  --  17   < >  --    < >  --    LDH  --   --   --   --   --  252*  --  220    < > = values in this interval not displayed.       Hematology Studies      Recent Labs   Lab Test 10/20/21  0835 10/15/21  1226 10/15/21  1226 10/13/21  0900 10/13/21  0900 10/07/21  0920 10/07/21  0920 10/05/21  1119 10/05/21  0820 09/30/21  0905 09/30/21  0905 09/04/21  1034 09/02/21  0620 09/01/21  0637 09/01/21  0637   WBC 4.9  --  4.5  --  4.8  --  6.0  --  6.5  --  6.2   < > 6.5   < > 5.0   ANEU  --   --   --   --   --   --   --   --   --   --   --   --  3.4  --  2.0   ALYM  --   --   --   --   --   --   --   --   --   --   --   --  1.3  --  1.5   VIJAY  --   --   --   --   --   --   --   --   --   --   --   --  1.1  --  1.1   AEOS  --   --   --   --   --   --   --   --   --   --   --   --  0.2  --  0.0   HGB 10.1*   < > 10.1*   < > 11.2*   < > 9.7*   < > 10.0*   < > 9.6*   < > 8.0*   < > 7.8*   HCT 32.9*   < > 33.5*   < > 36.9*   < > 31.9*   < > 33.6*   < > 32.1*   < > 26.8*   < > 26.6*      < > 267   < > 347   < > 306   < > 267   < > 240   < > 283   < > 288    < > = values in this interval not displayed.       Inflammatory Markers    Recent Labs   Lab Test 08/27/21  0550 07/19/21  0630 07/18/21  0552 07/16/21  0614 07/09/21  0632 07/08/21  1746 07/08/21  1533 05/13/21  0534 10/27/20  1250 10/20/20  1305 10/20/20  1305 10/13/20  1320 10/13/20  1320 10/06/20  1320 10/06/20  1320 09/30/20  1130 09/30/20  1130   SED  --   --   --   --   --  72*  --    --  10  --  8  --  10  --  20  --  9   .0* 13.0* 7.7 4.2 98.0*  --  87.0*   < > 3.5   < > 11.0*   < > 12.0*   < > 5.7   < > 0.6*    < > = values in this interval not displayed.       Immune Globulin Studies     Recent Labs   Lab Test 12/21/20  1133      IGM 55          Microbiology:    8/30 BAL : candida and MSSA and Aspergillus fumigatus, Asp GM neg   8/25, 8/24  NP COVID pos  8/25 Serum BDG and ASp GM  Neg, crypto antigen neg, urine histo antigen neg, fungal antibodies by ID neg     Imaging:  Ct chest 10/15/21  1. Nearly resolved left upper lobe consolidative opacity with residual  scarring and groundglass opacity.  2.  Similar to slightly decreased size of loculated right pleural  effusion and near resolution of anterior mediastinal simple fluid  density fluid collection.  3. Resolution of right upper lobe medial tree in bud nodularity.  4. Decreasing size of right apical pulmonary nodules, likely  representing an infectious or inflammatory etiology.    CT chest 8/24/21  1. New peripheral consolidative opacity in the left upper lobe which  appears partially cavitating. There are also multiple new pulmonary  nodules measuring up to 5 mm. Differential includes pneumonia,  including atypical pneumonias with possible developing abscess  formation, organizing pneumonia, and septic pulmonary emboli. Previous  right lower lobe cavitary lesion appears resolved with residual  scarring.  2. Continued loculated right pleural effusion and anterior mediastinal  simple fluid collection. Pneumothorax component has resolved.  3. Stable bilateral lower lobe bronchiectasis and scarring.

## 2021-10-25 NOTE — PROGRESS NOTES
Pt called stating he is out of vori. He has taken the medication for 3 days. Prescription sent in last week. ID contacted to review plan.     Pt also stated he is out of MMF. Pt reminded he can call on call coordinator on the weekends if he ever run into a problem like this. Prescription sent. Pt reminded the importance of not missing IMS meds. Pt verbalized understanding.

## 2021-10-25 NOTE — LETTER
10/25/2021       RE: Jim Willingham  [address] [facility] [address]     Dear Colleague,    Thank you for referring your patient, Jim Willingham, to the [facility]IOUS DISEASE CLIN[facility] [place] [facility]. Please see a copy of my visit note below.    Jim is a 66 year old who is being evaluated via a billable video visit.      How would you like to obtain your AVS? MyChart  If the video visit is dropped, the invitation should be resent by: Text to cell phone: [phone]  Will anyone else be joining your video visit? No      Video Start Time: 3.14 pm  Video-Visit Details    Type of service:  Video Visit    Video End Time:3:43 PM    Originating Location (pt. Location): Home    Distant Location (provider location):  [facility] INFECTIOUS DISEASE CLIN[facility] [place]     Platform used for Video Visit: [employer]     Total time including chart review, care-coordination and documentation time on the date of encounter - 60 mins      [facility]  Transplant Infectious Disease Progress Note     Patient:  Jim Willingham, Date of birth 1955, Medical record number [id number]  Date of Visit:  10/25/2021         Assessment and Recommendations:     66 year old male with NICMP s/p LVAD in 2017 and OHT in 5/2021 who presented with 2-3 day history of fever and SOB and found to be COVID 19 pos with KALI consolidative and cavitartion.      1. Mild to moderate KALI cavitary consolidation: tested positive for COVID on 8/24. Received monoclonal antibodies and 5 days of remdesivir.   Serum Asp gm and 1,3 BD glucan, CRAG, MRSA nares negative. 8/30 BAL Asp gm negative. Bronchoscopy culture has grown Staphylococcus aureus and Candida. He was dced on augmentin for the MSSA. After discharge aspergillus fumigatus grew in culture, so he was started on voriconazole 300 mg bid 33 days ago and has tolerated it well. He ran out of it 3 days ago  but is supposed to get a refill today.     In retrospect I think he had COVID pneumonia with possibly secondary bacterial infection with MSSA based on clinical presentation, CT findings, response to treatment clinically and radiologically. I dont think he had aspergillus pneumonia. However recovery of aspergillus (colonization) in a heart transplant recipient less than 1 year out of transplant is a risk factor for invasive aspergillosis and also in covid positive patients. Therefore even though I dont think he had aspergillus pneumonia I will continue aspergillus therapy to complete a 2 months duration to prevent invasive disease.       Previous ID problems:  1. RUPINDER hominis BSI in 9/2020 treated with vancomycin for 6 weeks, presumed to be LVAD related.  2. MRSE in a hand joint in broth only deemed a contaminant on 7/8/2021 with inflamed joint due to acute pseudogout.   3. RLL pneumonia with exudative pleural effusion in 7/2021. The effusion was negative for fungal/bacterial/AFB cx. Treated with IV ABx then levaquin. Urine and serum Blasto and Histo Ag were negative, A galactomannan was negative for BD glucan was intermediate. No antifungal therapy was given with repeat CT 7/23/2021 showed improvement.  4. H influenzae pneumonia (1/2020)    Other Infectious Diseases Concerns Include:  - QTc interval: QTc 420  - Bacterial prophylaxis: none   - PCP prophylaxis:pentamidine 8/11, stopped in Sep ? As 6 months completed and did not tolerate it well with SOB   - Viral serostatus & prophylaxis:CMV D+/R+, EBV D+/R+, HSV1+/2-, VZV +, Toxo D-/R-  - Fungal prophylaxis: on treatment  - Immunization status: candidate for prevnar then the pneumovax vaccines, shingrix vaccine and the COVID-19 vaccine as he is now 6 months out of vaccine   - Immunoglobulin status: IgG 838  - Immunosuppression: Basiliximab for induction. TAC/MMF     Recommendations:  - continue voriconazole 300 mg po bid for 1 more month to complete a 2 month  therapy  - patient to inform heart transplant coordinator when he is to be done with vori, so that tac dose can be adjusted  - recommend Flu, covid, pneumovax, shingrix vaccinations since he is 6 months out of transplant and is on lower immunosuppression.     Anisa Quiñones  , [facility]  Division of Infectious Disease, Department of Internal Medicine  Pager [phone]          Interval History:     This is a follow up after hospital discharge. First time seeing patient. Seen by my colleague as an inpatient.   Briefly HPI  66 year old male with NICMP s/p LVAD in 2017 and OHT in 5/2021 on tacrolimus, cellcept 500 mg bid currently who presented on 8.28 with sudden onset of SOB and febrile for 2-3 days. He was found to be covid positive, CT chest showed left lung consolidation with some cavitation. He underwent bronch and cx grew MSSA. He was initially on zosyn and transitioned to augmentin at the time of discharge. He rcvd monoclonal antibodies x1  and 5 days of remdesivir. He reports that SOB resolved soon after receiving nebs in the ER. 8/28 TTE w/o significant valvular abnormalities making septic emboli less likely. After discharge bronch results came back positive for aspergillus fumigatus. Therefore he was started on voriconazole 300 mg bid about a 33 days ago. He tolerated it well and ran out of it 3 days ago and pharmacy has promised to refill it today.     He had a follow up CT chest 10/15 and that shows almost complete resolution of the cavitary consolidation. vori level has been good.    Ref. Range 9/21/2021 08:21 10/15/2021 12:26   Voriconazole Latest Ref Range: 1.0 - 5.5 ug/mL 5.2 2.3         Transplants:  5/6/2021 (Heart), Postoperative day:  172.  Coordinator Yolette Rueda        Allergies   Allergen Reactions     Grass Shortness Of Breath     Ace Inhibitors Cough     Cats      Dust Mites Other (See Comments)     Asthma     Mold Other (See Comments)     Asthma      Penicillins Other (See Comments)     Unknown - childhood exposure    Tolerated Zosyn 9/18-9/20/2020    Per patient report he has tolerated amoxicillin     Sulfa Drugs Other (See Comments) and Unknown     Unknown childhood reaction            Physical Exam:   Unable to examine due to virtual visit          Laboratory Data:     Metabolic Studies       Recent Labs   Lab Test 10/20/21  0835 10/15/21  1227 10/15/21  1227 10/07/21  0920 10/05/21  0820 09/24/21  0801 09/21/21  0821 08/25/21  1645 08/25/21  1427 08/24/21  1758 08/24/21  0613 08/24/21  0559 08/23/21  0918 07/09/21  0632 07/08/21  1533 05/05/21  0628 05/04/21  2313     --  137   < > 138   < > 144   < >  --    < > 136  --    < >   < > 134   < > 135   POTASSIUM 4.5  --  5.0   < > 5.1   < > 3.7   < >  --    < > 4.1  --    < >   < > 4.6   < > 4.0   CHLORIDE 111*  --  113*   < > 108   < > 114*   < >  --    < > 106  --    < >   < > 104   < > 101   CO2 21  --  22   < > 22   < > 21   < >  --    < > 26  --    < >   < > 22   < > 26   ANIONGAP 4   < > 2*   < > 8   < > 9   < >  --    < > 4  --    < >   < > 7   < > 7   BUN 32*   < > 47*   < > 37*   < > 29   < >  --    < > 32*  --    < >   < > 37*   < > 50*   CR 1.66*  --  2.12*   < > 2.11*   < > 2.39*   < >  --    < > 1.91*  --    < >   < > 2.37*   < > 1.60*   GFRESTIMATED 43*   < > 32*   < > 32*   < > 28*   < >  --    < > 36*  --    < >   < > 28*   < > 45*   *   < > 111*   < > 89   < > 151*   < >  --    < > 192*  --    < >   < > 133*   < > 115*   A1C  --   --   --   --   --   --   --   --   --   --   --   --   --   --   --   --  5.1   QAMAR 8.3*  --  8.8   < > 9.1   < > 8.4*   < >  --    < > 8.0*  --    < >   < > 7.9*   < > 9.1   PHOS  --   --   --   --  3.8   < > 2.9   < >  --   --   --   --   --    < >  --    < > 3.8   MAG  --   --   --   --  1.8   < > 2.0   < >  --    < > 1.5*  --   --    < >  --    < > 2.3   LACT  --   --   --   --   --   --   --   --   --   --   --  0.8  --   --  1.0   < >  --    PCAL  --    --   --   --   --   --   --   --   --   --  0.16  --   --   --  0.18   < >  --    FGTL  --   --   --   --   --   --   --   --  <31  --   --   --   --    < >  --    < >  --    CKT  --   --   --   --  44  --  57  --   --   --   --   --   --   --   --    < >  --     < > = values in this interval not displayed.       Hepatic Studies    Recent Labs   Lab Test 09/21/21  0821 08/28/21  0633 08/24/21  0613 08/24/21  0613 07/14/21  0554 07/13/21 2053 07/12/21  1115 07/10/21  0624   BILITOTAL 0.3 0.3  --  0.6   < >  --    < >  --    DBIL  --  0.1  --   --    < >  --    < >  --    ALKPHOS 122 77  --  122   < >  --    < >  --    PROTTOTAL 6.4* 5.4*   < > 6.2*   < >  --    < > 5.3*   ALBUMIN 2.8* 1.9*  --  2.6*   < >  --    < >  --    AST 19 11  --  10   < >  --    < >  --    ALT 16 10  --  17   < >  --    < >  --    LDH  --   --   --   --   --  252*  --  220    < > = values in this interval not displayed.       Hematology Studies      Recent Labs   Lab Test 10/20/21  0835 10/15/21  1226 10/15/21  1226 10/13/21  0900 10/13/21  0900 10/07/21  0920 10/07/21  0920 10/05/21  1119 10/05/21  0820 09/30/21  0905 09/30/21  0905 09/04/21  1034 09/02/21  0620 09/01/21  0637 09/01/21  0637   WBC 4.9  --  4.5  --  4.8  --  6.0  --  6.5  --  6.2   < > 6.5   < > 5.0   ANEU  --   --   --   --   --   --   --   --   --   --   --   --  3.4  --  2.0   ALYM  --   --   --   --   --   --   --   --   --   --   --   --  1.3  --  1.5   VIJAY  --   --   --   --   --   --   --   --   --   --   --   --  1.1  --  1.1   AEOS  --   --   --   --   --   --   --   --   --   --   --   --  0.2  --  0.0   HGB 10.1*   < > 10.1*   < > 11.2*   < > 9.7*   < > 10.0*   < > 9.6*   < > 8.0*   < > 7.8*   HCT 32.9*   < > 33.5*   < > 36.9*   < > 31.9*   < > 33.6*   < > 32.1*   < > 26.8*   < > 26.6*      < > 267   < > 347   < > 306   < > 267   < > 240   < > 283   < > 288    < > = values in this interval not displayed.       Inflammatory Markers    Recent Labs   Lab  Test 08/27/21  0550 07/19/21  0630 07/18/21  0552 07/16/21  0614 07/09/21  0632 07/08/21  1746 07/08/21  1533 05/13/21  0534 10/27/20  1250 10/20/20  1305 10/20/20  1305 10/13/20  1320 10/13/20  1320 10/06/20  1320 10/06/20  1320 09/30/20  1130 09/30/20  1130   SED  --   --   --   --   --  72*  --   --  10  --  8  --  10  --  20  --  9   .0* 13.0* 7.7 4.2 98.0*  --  87.0*   < > 3.5   < > 11.0*   < > 12.0*   < > 5.7   < > 0.6*    < > = values in this interval not displayed.       Immune Globulin Studies     Recent Labs   Lab Test 12/21/20  1133      IGM 55          Microbiology:    8/30 BAL : candida and MSSA and Aspergillus fumigatus, Asp GM neg   8/25, 8/24  NP COVID pos  8/25 Serum BDG and ASp GM  Neg, crypto antigen neg, urine histo antigen neg, fungal antibodies by ID neg     Imaging:  Ct chest 10/15/21  1. Nearly resolved left upper lobe consolidative opacity with residual  scarring and groundglass opacity.  2.  Similar to slightly decreased size of loculated right pleural  effusion and near resolution of anterior mediastinal simple fluid  density fluid collection.  3. Resolution of right upper lobe medial tree in bud nodularity.  4. Decreasing size of right apical pulmonary nodules, likely  representing an infectious or inflammatory etiology.    CT chest 8/24/21  1. New peripheral consolidative opacity in the left upper lobe which  appears partially cavitating. There are also multiple new pulmonary  nodules measuring up to 5 mm. Differential includes pneumonia,  including atypical pneumonias with possible developing abscess  formation, organizing pneumonia, and septic pulmonary emboli. Previous  right lower lobe cavitary lesion appears resolved with residual  scarring.  2. Continued loculated right pleural effusion and anterior mediastinal  simple fluid collection. Pneumothorax component has resolved.  3. Stable bilateral lower lobe bronchiectasis and scarring.

## 2021-10-27 ENCOUNTER — LAB (OUTPATIENT)
Dept: LAB | Facility: CLINIC | Age: 66
End: 2021-10-27
Payer: COMMERCIAL

## 2021-10-27 DIAGNOSIS — Z94.1 TRANSPLANTED HEART (H): ICD-10-CM

## 2021-10-27 LAB
ALBUMIN SERPL-MCNC: 3.6 G/DL (ref 3.4–5)
ALP SERPL-CCNC: 143 U/L (ref 40–150)
ALT SERPL W P-5'-P-CCNC: 19 U/L (ref 0–70)
ANION GAP SERPL CALCULATED.3IONS-SCNC: 5 MMOL/L (ref 3–14)
AST SERPL W P-5'-P-CCNC: 16 U/L (ref 0–45)
BILIRUB SERPL-MCNC: 0.3 MG/DL (ref 0.2–1.3)
BUN SERPL-MCNC: 33 MG/DL (ref 7–30)
CALCIUM SERPL-MCNC: 8.6 MG/DL (ref 8.5–10.1)
CHLORIDE BLD-SCNC: 103 MMOL/L (ref 94–109)
CO2 SERPL-SCNC: 28 MMOL/L (ref 20–32)
CREAT SERPL-MCNC: 2.02 MG/DL (ref 0.66–1.25)
ERYTHROCYTE [DISTWIDTH] IN BLOOD BY AUTOMATED COUNT: 15.7 % (ref 10–15)
GFR SERPL CREATININE-BSD FRML MDRD: 34 ML/MIN/1.73M2
GLUCOSE BLD-MCNC: 112 MG/DL (ref 70–99)
HCT VFR BLD AUTO: 33.3 % (ref 40–53)
HGB BLD-MCNC: 10.5 G/DL (ref 13.3–17.7)
HOLD SPECIMEN: NORMAL
MCH RBC QN AUTO: 29.6 PG (ref 26.5–33)
MCHC RBC AUTO-ENTMCNC: 31.5 G/DL (ref 31.5–36.5)
MCV RBC AUTO: 94 FL (ref 78–100)
PLATELET # BLD AUTO: 264 10E3/UL (ref 150–450)
POTASSIUM BLD-SCNC: 4.1 MMOL/L (ref 3.4–5.3)
PROT SERPL-MCNC: 7.1 G/DL (ref 6.8–8.8)
RBC # BLD AUTO: 3.55 10E6/UL (ref 4.4–5.9)
SODIUM SERPL-SCNC: 136 MMOL/L (ref 133–144)
TACROLIMUS BLD-MCNC: 5.9 UG/L (ref 5–15)
TME LAST DOSE: NORMAL H
TME LAST DOSE: NORMAL H
WBC # BLD AUTO: 4.9 10E3/UL (ref 4–11)

## 2021-10-27 PROCEDURE — 80053 COMPREHEN METABOLIC PANEL: CPT

## 2021-10-27 PROCEDURE — 85027 COMPLETE CBC AUTOMATED: CPT

## 2021-10-27 PROCEDURE — 36415 COLL VENOUS BLD VENIPUNCTURE: CPT

## 2021-10-27 PROCEDURE — 80197 ASSAY OF TACROLIMUS: CPT

## 2021-10-28 ENCOUNTER — PRE VISIT (OUTPATIENT)
Dept: CARDIOLOGY | Facility: CLINIC | Age: 66
End: 2021-10-28

## 2021-10-28 DIAGNOSIS — Z94.1 TRANSPLANTED HEART (H): Primary | ICD-10-CM

## 2021-10-28 RX ORDER — LIDOCAINE 40 MG/G
CREAM TOPICAL
Status: CANCELLED | OUTPATIENT
Start: 2021-10-28

## 2021-10-29 LAB — ACID FAST STAIN (ARUP): NORMAL

## 2021-10-31 ENCOUNTER — LAB (OUTPATIENT)
Dept: URGENT CARE | Facility: URGENT CARE | Age: 66
End: 2021-10-31
Attending: SPECIALIST
Payer: COMMERCIAL

## 2021-10-31 DIAGNOSIS — Z11.59 ENCOUNTER FOR SCREENING FOR OTHER VIRAL DISEASES: ICD-10-CM

## 2021-10-31 PROCEDURE — U0005 INFEC AGEN DETEC AMPLI PROBE: HCPCS

## 2021-10-31 PROCEDURE — U0003 INFECTIOUS AGENT DETECTION BY NUCLEIC ACID (DNA OR RNA); SEVERE ACUTE RESPIRATORY SYNDROME CORONAVIRUS 2 (SARS-COV-2) (CORONAVIRUS DISEASE [COVID-19]), AMPLIFIED PROBE TECHNIQUE, MAKING USE OF HIGH THROUGHPUT TECHNOLOGIES AS DESCRIBED BY CMS-2020-01-R: HCPCS

## 2021-11-01 LAB — SARS-COV-2 RNA RESP QL NAA+PROBE: NEGATIVE

## 2021-11-02 NOTE — NURSING NOTE
Transplant Coordinator Note    Reason for visit: 6 month follow up.  Coordinator: Present       Health concerns addressed today:  1. Recently saw ID:   Recommendations:  - continue voriconazole 300 mg po bid for 1 more month to complete a 2 month therapy  - patient to inform heart transplant coordinator when he is to be done with vori, so that tac dose can be adjusted  - recommend Flu, covid, pneumovax, shingrix vaccinations since he is 6 months out of transplant and is on lower immunosuppression.     2. Statin deferred for elevated LFT's while inpatient. Hold off now per     3. Cr 2. Nephrology referral 11/30    4. PMD appt 11/9    5. Baseline weight 175 lbs right now.     6. If hbx is negative, lets change tac goal to 4-6. Also if immuknow is ok. Wbc = 4.4.     Immunosuppressants:  Tacro 6-8 (early). Current 1 mg/1.5 mg      bid     No DSAS  Baseline angiogram? Deferred for now.   Immuknow 323 8/5      Resulted labs reviewed with patient  Medication record reviewed and reconciled  Questions and concerns addressed  Pt verbalized an understanding of plan of care.     Patient Instructions  1. Try interval training on bike. 2 mins hard, 2 minutes light.   2. Plan for angio at 1st annual for baseline.   3. Keep up the good work. Let me know if you need anything.     Next transplant clinic appointment: 8 month w/ echo and labs (allomap)  Next lab draw: TBD, depending on today's labs.   Coordinator will call with all pending results.     Please call transplant coordinator with any questions:    Yolette Rueda RN BSN   Post Heart Transplant Nurse Coordinator  [facility]  Questions: [phone]

## 2021-11-03 ENCOUNTER — TELEPHONE (OUTPATIENT)
Dept: CARDIOLOGY | Facility: CLINIC | Age: 66
End: 2021-11-03

## 2021-11-03 ENCOUNTER — TELEPHONE (OUTPATIENT)
Dept: TRANSPLANT | Facility: CLINIC | Age: 66
End: 2021-11-03

## 2021-11-03 NOTE — PROGRESS NOTES
Service Date November 4, 2021    This is a followup heart transplant visit.  6 month visit    HISTORY OF PRESENT ILLNESS:  Briefly, Mr. Willingham is a 66-year-old man with nonischemic cardiomyopathy who had LVAD placement as bridge therapy and ultimately underwent orthotopic heart transplant on 05/06/2021.  His postoperative course has been complex and I will summarize it here.  He had a right pleural leak with a prolonged chest tube.  He had pneumonia in the hospital early after heart transplantation.  He then had later a persistent pleural fluid collection, which was exudative, but repeatedly cultured negative.  He was readmitted one time for ongoing weakness and gout pain.  He again underwent therapeutic paracentesis and this was found to be exudative.  It was not chylothorax, aerobic, fungal, anaerobic cultures were all negative.  Repeat imaging of his chest found a right upper lobe cavity lesion, but studies of this were all indeterminate.  Therefore, he was not placed on antifungals at that time.  Chest CT 07/13/2021 had an anterior mediastinal fluid tracking.  Plan was to monitor at that time.  He then developed transaminase elevation with questionable choledocholithiasis.  Ultrasound did not show need for further intervention at that time and LFTs normalized.    He then was admitted with COVID-19 and found to have also a simultaneous left upper lobe cavitary lesion.  He is now on antifungals, plan is now one more month of vori and then will stop.     At today's visit he is feeling better than he has in some time.  He feels his strength is returning.  His appetite is normalized.  His gout is under control.  He is using his exercise bike several times a week and is tracking his steps and they are improving.    He denies any cough, fever or chills.  His breathing feels excellent.  He denies PND orthopnea or lower extremity edema.     He has some questions about his immunosuppression.       FAMILY HISTORY:  No  changes from prior.    SOCIAL HISTORY:  His significant other just lost her job and this may compromise their insurance and they are working with  but this is an additional stressor.    CURRENT MEDICATIONS:      Current Outpatient Medications   Medication Sig Dispense Refill     acetaminophen (TYLENOL) 325 MG tablet Take 3 tablets (975 mg) by mouth every 6 hours as needed for other (For optimal non-opioid multimodal pain management to improve pain control.) 100 tablet 1     albuterol (VENTOLIN HFA) 108 (90 Base) MCG/ACT inhaler INHALE 2 PUFFS BY MOUTH EVERY 4 HOURS AS NEEDED. MAX OF 12 PUFFS PER 24 HOURS 18 g 3     allopurinol (ZYLOPRIM) 300 MG tablet Take 1 tablet (300 mg) by mouth daily 30 tablet 0     anakinra (KINERET) 100 MG/0.67ML SOSY injection Inject subcutaneously once every 24 hours for 3 days. Contact MD if still with gout symptoms on day 3. Hold for signs of infection, then seek medical attention. 4.69 mL 11     aspirin (ASA) 81 MG chewable tablet Take 1 tablet (81 mg) by mouth daily 100 tablet 1     blood glucose (NO BRAND SPECIFIED) test strip Use to test blood sugar four times daily or as directed. 200 strip 3     blood glucose (ONE TOUCH DELICA) lancing device Lancing device to be used with lancets. 1 each 0     blood glucose monitoring (ONE TOUCH ULTRA 2) meter device kit Use to test blood sugar four times daily or as directed. 1 kit 0     buPROPion (WELLBUTRIN) 75 MG tablet Take 1 tablet (75 mg) by mouth 2 times daily 60 tablet 1     calcium citrate-vitamin D (CITRACAL) 315-250 MG-UNIT TABS per tablet Take 1 tablet by mouth 2 times daily 60 tablet 1     Continuous Blood Gluc Transmit (DEXCOM G6 TRANSMITTER) MISC        ferrous sulfate (FEROSUL) 325 (65 Fe) MG tablet Take 1 tablet (325 mg) by mouth daily (with breakfast) 90 tablet 3     Fluticasone-Umeclidin-Vilanterol (TRELEGY ELLIPTA) 100-62.5-25 MCG/INH oral inhaler Inhale 1 puff into the lungs daily 1 each 1     gabapentin  (NEURONTIN) 300 MG capsule Take 1 capsule (300 mg) by mouth 3 times daily 60 capsule 1     insulin pen needle (32G X 4 MM) 32G X 4 MM miscellaneous Use four pen needles daily or as directed. 110 each 0     Melatonin 10 MG CAPS Take 1 capsule by mouth At Bedtime       montelukast (SINGULAIR) 10 MG tablet Take 1 tablet (10 mg) by mouth At Bedtime 90 tablet 0     mycophenolate (GENERIC EQUIVALENT) 250 MG capsule Take 2 capsules (500 mg) by mouth 2 times daily 120 capsule 3     pantoprazole (PROTONIX) 40 MG EC tablet TAKE 1 TABLET(40 MG) BY MOUTH TWICE DAILY 60 tablet 1     tacrolimus (GENERIC EQUIVALENT) 0.5 MG capsule Take 3 capsules (1.5 mg) by mouth every evening 270 capsule 3     tacrolimus (GENERIC EQUIVALENT) 1 MG capsule Take 1 capsule (1 mg) by mouth every morning. 900 capsule 11     traMADol (ULTRAM) 50 MG tablet Take 0.5-1 tablets (25-50 mg) by mouth every 6 hours as needed for moderate pain 25 tablet 1     voriconazole (VFEND) 200 MG tablet Take 1.5 tablets (300 mg) by mouth 2 times daily 90 tablet 1     anakinra (KINERET) 100 MG/0.67ML SOSY injection Inject 0.67 mLs (100 mg) Subcutaneous daily For 3 days. Hold for signs of infection, then seek medical attention. 20.1 mL 0     diazepam (VALIUM) 5 MG tablet Take 2 tablets (10 mg) by mouth once as needed for anxiety Pre-procedural anxiolytic for MRI.  Please take 1 tab 30-60 minutes prior to MRI.  Take 2nd tab if anxiety or claustrophobia persists upon entering machine.  Avoid taking narcotics in conjunction with this medication.  Avoid alcohol intake 24 hours prior to and after taking this medication. (Patient not taking: Reported on 10/25/2021) 2 tablet 0         REVIEW OF SYSTEMS:    CONSTITUTIONAL:  No fevers, chills or sweats over the last week. Gaining some weight intentionally  Appetite is normal now   ENT:  No visual disturbance, earaches, epistaxis, sore throat.  ALLERGIES:  Negative.  RESPIRATORY:  No cough or hemoptysis.  CARDIOVASCULAR:  As per  "HPI.  GASTROINTESTINAL:  No nausea, vomiting, hematemesis, melena, hematochezia.  GENITOURINARY:  No urinary frequency, dysuria, hematuria.  SKIN:  Negative.  PSYCHIATRIC:  Stable   NEUROLOGIC:  Right foot drop.  ENDOCRINE:  Blood sugars are controlled.  MUSCULOSKELETAL:  Gout controlled     PHYSICAL EXAMINATION:    VITAL SIGNS:  /87 (BP Location: Left arm, Patient Position: Chair, Cuff Size: Adult Regular)   Pulse 111   Ht 1.727 m (5' 8\")   Wt 79.4 kg (175 lb)   SpO2 99%   BMI 26.61 kg/m      GENERAL:  The patient appears comfortable, in no acute distress.  He is getting stronger  HEENT:  Anicteric.  Extraocular movements intact.  Oropharynx clear.  No thrush.  NECK:  No thyromegaly or masses.    CARDIOVASCULAR:  Normal S1, S2.  JVP is less than 10.  PMI focal.  No rub.    LUNGS:  Clear to auscultation bilaterally.  No dullness to percussion and no areas of decreased breath sounds.  ABDOMEN:  Soft, nontender, nondistended.  EXTREMITIES:  No clubbing, cyanosis or edema.      LABORATORY DATA:     Data reviewed today demonstrate a creatinine of 2.2  Potassium 4.7    White blood cell count 4.4 hemoglobin 11.3 platelets 289    Immuneknow is pending     Donor specific antibodies negative.  He has not yet had a baseline angiogram due to renal dysfunction. Last hemodynamics were normal.      RHC.bx today         ASSESSMENT AND RECOMMENDATIONS:  In summary, this is a very pleasant 66-year-old man with a cardiac transplant after LVAD as bridge therapy on 05/06/2021 who has had a relatively complex postoperative course.  This is routine post-transplant followup.    I think he has finally catching his stride post transplant.  His initial course was complicated b infections - he has not had rejection.  He also has ongoing renal dysfunction.  At this point I would be favoring running his immunosuppression on the lower side rather than the higher side.  We will repeat DSA's and an immuknow pending from today.  He also " has another biopsy pending.  He also has low level EBV viremia in the past this is again pending today.     If the above testing looks like there is no rejection, no DSA's and it appears as though we are over immunosuppressing him I will lower his Tac goal to 4-6      I am not opposed to coming off of calcineurin inhibition entirely but I would like to wait until or after voriconazole before making adjustments to his immunosuppression to avoid wide swings again.     For now we will keep him on tacrolimus 5-7, CellCept 500 twice daily.  May lower pending the above results      Serostatus : CMV positive-positive, EBV positive-positive, toxo negative-negative.      Left upper lobe cavitary lesion.  He is currently on voriconazole and has ID followup scheduled.  Scheduled to come off this in a month we will need to watch his Prograf level closely after that    He has a mediastinal fluid collection that we are just following over time.  I suspect we will have another CT at some point.    Chronic kidney disease - seeing renal later this month- may come off of CNI - see above     Recent transaminitis, resolved    Recurrent gout flare.  Appreciate Rheumatology assistance as this limits his mobility.  He is on anakinra as well as allopurinol.    Neuropathy- stable     Polypharmacy.  Recently reviewed by pharmacy    For his emphysema, he is on several inhalers and follows with Pulmonary.      I am okay holding off on his baseline angiogram for now and it may be that we just do not get this before a year        For his deconditioning, he is making great strides.  Encouragement provided today and I recommended that he do high intensity interval training settings on his.      RTC per protocol     Delisa Montgomery MD

## 2021-11-03 NOTE — TELEPHONE ENCOUNTER
Patient Call: has appt tomorrow  11/04/2021 was wondering if he could get the flu shot/?      Call back needed? Yes    Return Call Needed  Same as documented in contacts section  When to return call?: Same day: Route High Priority

## 2021-11-03 NOTE — TELEPHONE ENCOUNTER
Called  patient to complete Travel Screen for Cardiac Cath Lab appointment on 11/4 and inform patient of updated Visitor Policy.     No answer, VM full.   covid neg

## 2021-11-03 NOTE — TELEPHONE ENCOUNTER
Call returned to patient. Pt to present to pharmacy in between appointments to receive the flu shot. appt times and instructions reviewed. No further questions at this time.

## 2021-11-04 ENCOUNTER — APPOINTMENT (OUTPATIENT)
Dept: CARDIOLOGY | Facility: CLINIC | Age: 66
End: 2021-11-04
Attending: INTERNAL MEDICINE
Payer: COMMERCIAL

## 2021-11-04 ENCOUNTER — HOSPITAL ENCOUNTER (OUTPATIENT)
Facility: CLINIC | Age: 66
Discharge: HOME | End: 2021-11-04
Attending: INTERNAL MEDICINE | Admitting: INTERNAL MEDICINE
Payer: COMMERCIAL

## 2021-11-04 ENCOUNTER — LAB (OUTPATIENT)
Dept: LAB | Facility: CLINIC | Age: 66
End: 2021-11-04
Payer: COMMERCIAL

## 2021-11-04 ENCOUNTER — APPOINTMENT (OUTPATIENT)
Dept: MEDSURG UNIT | Facility: CLINIC | Age: 66
End: 2021-11-04
Attending: INTERNAL MEDICINE
Payer: COMMERCIAL

## 2021-11-04 VITALS
DIASTOLIC BLOOD PRESSURE: 109 MMHG | SYSTOLIC BLOOD PRESSURE: 142 MMHG | OXYGEN SATURATION: 97 % | HEART RATE: 110 BPM | TEMPERATURE: 98 F | RESPIRATION RATE: 18 BRPM | BODY MASS INDEX: 26.52 KG/M2 | HEIGHT: 68 IN | WEIGHT: 175 LBS

## 2021-11-04 VITALS
WEIGHT: 175 LBS | SYSTOLIC BLOOD PRESSURE: 132 MMHG | BODY MASS INDEX: 26.52 KG/M2 | OXYGEN SATURATION: 99 % | DIASTOLIC BLOOD PRESSURE: 87 MMHG | HEART RATE: 111 BPM | HEIGHT: 68 IN

## 2021-11-04 DIAGNOSIS — Z94.1 TRANSPLANTED HEART (H): ICD-10-CM

## 2021-11-04 DIAGNOSIS — Z94.1 TRANSPLANTED HEART (H): Primary | ICD-10-CM

## 2021-11-04 DIAGNOSIS — Z94.1 HEART REPLACED BY TRANSPLANT (H): Primary | ICD-10-CM

## 2021-11-04 LAB
ALBUMIN SERPL-MCNC: 3.5 G/DL (ref 3.4–5)
ALP SERPL-CCNC: 136 U/L (ref 40–150)
ALT SERPL W P-5'-P-CCNC: 14 U/L (ref 0–70)
ANION GAP SERPL CALCULATED.3IONS-SCNC: 4 MMOL/L (ref 3–14)
AST SERPL W P-5'-P-CCNC: 10 U/L (ref 0–45)
BASOPHILS # BLD AUTO: 0.1 10E3/UL (ref 0–0.2)
BASOPHILS NFR BLD AUTO: 2 %
BILIRUB SERPL-MCNC: 0.4 MG/DL (ref 0.2–1.3)
BUN SERPL-MCNC: 41 MG/DL (ref 7–30)
CALCIUM SERPL-MCNC: 8.9 MG/DL (ref 8.5–10.1)
CHLORIDE BLD-SCNC: 109 MMOL/L (ref 94–109)
CK SERPL-CCNC: 85 U/L (ref 30–300)
CMV DNA SPEC NAA+PROBE-ACNC: NOT DETECTED IU/ML
CO2 SERPL-SCNC: 24 MMOL/L (ref 20–32)
CREAT SERPL-MCNC: 2.2 MG/DL (ref 0.66–1.25)
EOSINOPHIL # BLD AUTO: 0.2 10E3/UL (ref 0–0.7)
EOSINOPHIL NFR BLD AUTO: 4 %
ERYTHROCYTE [DISTWIDTH] IN BLOOD BY AUTOMATED COUNT: 14.8 % (ref 10–15)
GFR SERPL CREATININE-BSD FRML MDRD: 31 ML/MIN/1.73M2
GLUCOSE BLD-MCNC: 88 MG/DL (ref 70–99)
HCT VFR BLD AUTO: 36.9 % (ref 40–53)
HGB BLD-MCNC: 11.3 G/DL (ref 13.3–17.7)
HGB BLD-MCNC: 9.5 G/DL (ref 13.3–17.7)
IMM GRANULOCYTES # BLD: 0 10E3/UL
IMM GRANULOCYTES NFR BLD: 0 %
LVEF ECHO: NORMAL
LYMPHOCYTES # BLD AUTO: 1.9 10E3/UL (ref 0.8–5.3)
LYMPHOCYTES NFR BLD AUTO: 43 %
MAGNESIUM SERPL-MCNC: 1.9 MG/DL (ref 1.6–2.3)
MCH RBC QN AUTO: 29.7 PG (ref 26.5–33)
MCHC RBC AUTO-ENTMCNC: 30.6 G/DL (ref 31.5–36.5)
MCV RBC AUTO: 97 FL (ref 78–100)
MONOCYTES # BLD AUTO: 0.7 10E3/UL (ref 0–1.3)
MONOCYTES NFR BLD AUTO: 15 %
NEUTROPHILS # BLD AUTO: 1.6 10E3/UL (ref 1.6–8.3)
NEUTROPHILS NFR BLD AUTO: 36 %
NRBC # BLD AUTO: 0 10E3/UL
NRBC BLD AUTO-RTO: 0 /100
OXYHGB MFR BLDV: 70 % (ref 92–100)
PHOSPHATE SERPL-MCNC: 3.2 MG/DL (ref 2.5–4.5)
PLATELET # BLD AUTO: 289 10E3/UL (ref 150–450)
POTASSIUM BLD-SCNC: 4.7 MMOL/L (ref 3.4–5.3)
PROT SERPL-MCNC: 7 G/DL (ref 6.8–8.8)
RBC # BLD AUTO: 3.8 10E6/UL (ref 4.4–5.9)
SODIUM SERPL-SCNC: 137 MMOL/L (ref 133–144)
TACROLIMUS BLD-MCNC: 16.7 UG/L (ref 5–15)
TME LAST DOSE: ABNORMAL H
TME LAST DOSE: ABNORMAL H
WBC # BLD AUTO: 4.4 10E3/UL (ref 4–11)

## 2021-11-04 PROCEDURE — 83735 ASSAY OF MAGNESIUM: CPT | Performed by: PATHOLOGY

## 2021-11-04 PROCEDURE — G0463 HOSPITAL OUTPT CLINIC VISIT: HCPCS | Mod: 25

## 2021-11-04 PROCEDURE — 88346 IMFLUOR 1ST 1ANTB STAIN PX: CPT | Mod: TC | Performed by: INTERNAL MEDICINE

## 2021-11-04 PROCEDURE — 272N000001 HC OR GENERAL SUPPLY STERILE: Performed by: INTERNAL MEDICINE

## 2021-11-04 PROCEDURE — 93306 TTE W/DOPPLER COMPLETE: CPT

## 2021-11-04 PROCEDURE — 80053 COMPREHEN METABOLIC PANEL: CPT | Performed by: PATHOLOGY

## 2021-11-04 PROCEDURE — 999N000132 HC STATISTIC PP CARE STAGE 1

## 2021-11-04 PROCEDURE — 250N000009 HC RX 250: Performed by: INTERNAL MEDICINE

## 2021-11-04 PROCEDURE — 93505 ENDOMYOCARDIAL BIOPSY: CPT | Performed by: INTERNAL MEDICINE

## 2021-11-04 PROCEDURE — 93321 DOPPLER ECHO F-UP/LMTD STD: CPT | Mod: 26 | Performed by: INTERNAL MEDICINE

## 2021-11-04 PROCEDURE — 80197 ASSAY OF TACROLIMUS: CPT | Mod: 90 | Performed by: PATHOLOGY

## 2021-11-04 PROCEDURE — 99214 OFFICE O/P EST MOD 30 MIN: CPT | Mod: 25 | Performed by: INTERNAL MEDICINE

## 2021-11-04 PROCEDURE — 99000 SPECIMEN HANDLING OFFICE-LAB: CPT | Performed by: PATHOLOGY

## 2021-11-04 PROCEDURE — 85018 HEMOGLOBIN: CPT

## 2021-11-04 PROCEDURE — C1894 INTRO/SHEATH, NON-LASER: HCPCS | Performed by: INTERNAL MEDICINE

## 2021-11-04 PROCEDURE — 87799 DETECT AGENT NOS DNA QUANT: CPT | Mod: 90 | Performed by: PATHOLOGY

## 2021-11-04 PROCEDURE — 82550 ASSAY OF CK (CPK): CPT | Performed by: PATHOLOGY

## 2021-11-04 PROCEDURE — 93325 DOPPLER ECHO COLOR FLOW MAPG: CPT | Mod: 26 | Performed by: INTERNAL MEDICINE

## 2021-11-04 PROCEDURE — 85025 COMPLETE CBC W/AUTO DIFF WBC: CPT | Performed by: PATHOLOGY

## 2021-11-04 PROCEDURE — 86352 CELL FUNCTION ASSAY W/STIM: CPT | Mod: 90 | Performed by: PATHOLOGY

## 2021-11-04 PROCEDURE — 36415 COLL VENOUS BLD VENIPUNCTURE: CPT | Performed by: PATHOLOGY

## 2021-11-04 PROCEDURE — 93308 TTE F-UP OR LMTD: CPT | Mod: 26 | Performed by: INTERNAL MEDICINE

## 2021-11-04 PROCEDURE — 84100 ASSAY OF PHOSPHORUS: CPT | Performed by: PATHOLOGY

## 2021-11-04 PROCEDURE — 88307 TISSUE EXAM BY PATHOLOGIST: CPT | Mod: 26 | Performed by: PATHOLOGY

## 2021-11-04 PROCEDURE — 93306 TTE W/DOPPLER COMPLETE: CPT | Mod: 26 | Performed by: INTERNAL MEDICINE

## 2021-11-04 PROCEDURE — 88350 IMFLUOR EA ADDL 1ANTB STN PX: CPT | Mod: 26 | Performed by: PATHOLOGY

## 2021-11-04 PROCEDURE — 93325 DOPPLER ECHO COLOR FLOW MAPG: CPT | Mod: XU

## 2021-11-04 PROCEDURE — 88346 IMFLUOR 1ST 1ANTB STAIN PX: CPT | Mod: 26 | Performed by: PATHOLOGY

## 2021-11-04 PROCEDURE — 82810 BLOOD GASES O2 SAT ONLY: CPT

## 2021-11-04 RX ORDER — LIDOCAINE 40 MG/G
CREAM TOPICAL
Status: COMPLETED | OUTPATIENT
Start: 2021-11-04 | End: 2021-11-04

## 2021-11-04 RX ADMIN — LIDOCAINE: 40 CREAM TOPICAL at 10:19

## 2021-11-04 ASSESSMENT — MIFFLIN-ST. JEOR
SCORE: 1558.29
SCORE: 1558.29

## 2021-11-04 ASSESSMENT — PAIN SCALES - GENERAL: PAINLEVEL: NO PAIN (0)

## 2021-11-04 NOTE — PATIENT INSTRUCTIONS
Patient Instructions  1. Try interval training on bike. 2 mins hard, 2 minutes light.   2. Plan for angio at 1st annual for baseline.   3. Keep up the good work. Let me know if you need anything.     Next transplant clinic appointment: 8 month w/ echo and labs (allomap)  Next lab draw: TBD, depending on today's labs.   Coordinator will call with all pending results.     Please call transplant coordinator with any questions:    Yolette Rueda RN BSN   Post Heart Transplant Nurse Coordinator  [facility]  Questions: [phone]

## 2021-11-04 NOTE — NURSING NOTE
Chief Complaint   Patient presents with     Follow Up     heart transplant return      Vitals were taken and medications were reconciled.   Johnnie Silva, EMT  7:55 AM

## 2021-11-04 NOTE — PROGRESS NOTES
Ok to jeannie pt pre-procedure care complete per CCL RN even though not all labs have been fully processed.

## 2021-11-04 NOTE — LETTER
11/4/2021      RE: Jim Willingham  [address] [facility] [address]       Dear Colleague,    Thank you for the opportunity to participate in the care of your patient, Jim Willingham, at the [facility] [place] [facility]. Please see a copy of my visit note below.    Service Date November 4, 2021    This is a followup heart transplant visit.  6 month visit    HISTORY OF PRESENT ILLNESS:  Briefly, Mr. Willingham is a 66-year-old man with nonischemic cardiomyopathy who had LVAD placement as bridge therapy and ultimately underwent orthotopic heart transplant on 05/06/2021.  His postoperative course has been complex and I will summarize it here.  He had a right pleural leak with a prolonged chest tube.  He had pneumonia in the hospital early after heart transplantation.  He then had later a persistent pleural fluid collection, which was exudative, but repeatedly cultured negative.  He was readmitted one time for ongoing weakness and gout pain.  He again underwent therapeutic paracentesis and this was found to be exudative.  It was not chylothorax, aerobic, fungal, anaerobic cultures were all negative.  Repeat imaging of his chest found a right upper lobe cavity lesion, but studies of this were all indeterminate.  Therefore, he was not placed on antifungals at that time.  Chest CT 07/13/2021 had an anterior mediastinal fluid tracking.  Plan was to monitor at that time.  He then developed transaminase elevation with questionable choledocholithiasis.  Ultrasound did not show need for further intervention at that time and LFTs normalized.    He then was admitted with COVID-19 and found to have also a simultaneous left upper lobe cavitary lesion.  He is now on antifungals, plan is now one more month of vori and then will stop.     At today's visit he is feeling better than he has in some time.  He feels his strength is returning.  His appetite is  normalized.  His gout is under control.  He is using his exercise bike several times a week and is tracking his steps and they are improving.    He denies any cough, fever or chills.  His breathing feels excellent.  He denies PND orthopnea or lower extremity edema.     He has some questions about his immunosuppression.       FAMILY HISTORY:  No changes from prior.    SOCIAL HISTORY:  His significant other just lost her job and this may compromise their insurance and they are working with  but this is an additional stressor.    CURRENT MEDICATIONS:      Current Outpatient Medications   Medication Sig Dispense Refill     acetaminophen (TYLENOL) 325 MG tablet Take 3 tablets (975 mg) by mouth every 6 hours as needed for other (For optimal non-opioid multimodal pain management to improve pain control.) 100 tablet 1     albuterol (VENTOLIN HFA) 108 (90 Base) MCG/ACT inhaler INHALE 2 PUFFS BY MOUTH EVERY 4 HOURS AS NEEDED. MAX OF 12 PUFFS PER 24 HOURS 18 g 3     allopurinol (ZYLOPRIM) 300 MG tablet Take 1 tablet (300 mg) by mouth daily 30 tablet 0     anakinra (KINERET) 100 MG/0.67ML SOSY injection Inject subcutaneously once every 24 hours for 3 days. Contact MD if still with gout symptoms on day 3. Hold for signs of infection, then seek medical attention. 4.69 mL 11     aspirin (ASA) 81 MG chewable tablet Take 1 tablet (81 mg) by mouth daily 100 tablet 1     blood glucose (NO BRAND SPECIFIED) test strip Use to test blood sugar four times daily or as directed. 200 strip 3     blood glucose (ONE TOUCH DELICA) lancing device Lancing device to be used with lancets. 1 each 0     blood glucose monitoring (ONE TOUCH ULTRA 2) meter device kit Use to test blood sugar four times daily or as directed. 1 kit 0     buPROPion (WELLBUTRIN) 75 MG tablet Take 1 tablet (75 mg) by mouth 2 times daily 60 tablet 1     calcium citrate-vitamin D (CITRACAL) 315-250 MG-UNIT TABS per tablet Take 1 tablet by mouth 2 times daily 60  tablet 1     Continuous Blood Gluc Transmit (DEXCOM G6 TRANSMITTER) MISC        ferrous sulfate (FEROSUL) 325 (65 Fe) MG tablet Take 1 tablet (325 mg) by mouth daily (with breakfast) 90 tablet 3     Fluticasone-Umeclidin-Vilanterol (TRELEGY ELLIPTA) 100-62.5-25 MCG/INH oral inhaler Inhale 1 puff into the lungs daily 1 each 1     gabapentin (NEURONTIN) 300 MG capsule Take 1 capsule (300 mg) by mouth 3 times daily 60 capsule 1     insulin pen needle (32G X 4 MM) 32G X 4 MM miscellaneous Use four pen needles daily or as directed. 110 each 0     Melatonin 10 MG CAPS Take 1 capsule by mouth At Bedtime       montelukast (SINGULAIR) 10 MG tablet Take 1 tablet (10 mg) by mouth At Bedtime 90 tablet 0     mycophenolate (GENERIC EQUIVALENT) 250 MG capsule Take 2 capsules (500 mg) by mouth 2 times daily 120 capsule 3     pantoprazole (PROTONIX) 40 MG EC tablet TAKE 1 TABLET(40 MG) BY MOUTH TWICE DAILY 60 tablet 1     tacrolimus (GENERIC EQUIVALENT) 0.5 MG capsule Take 3 capsules (1.5 mg) by mouth every evening 270 capsule 3     tacrolimus (GENERIC EQUIVALENT) 1 MG capsule Take 1 capsule (1 mg) by mouth every morning. 900 capsule 11     traMADol (ULTRAM) 50 MG tablet Take 0.5-1 tablets (25-50 mg) by mouth every 6 hours as needed for moderate pain 25 tablet 1     voriconazole (VFEND) 200 MG tablet Take 1.5 tablets (300 mg) by mouth 2 times daily 90 tablet 1     anakinra (KINERET) 100 MG/0.67ML SOSY injection Inject 0.67 mLs (100 mg) Subcutaneous daily For 3 days. Hold for signs of infection, then seek medical attention. 20.1 mL 0     diazepam (VALIUM) 5 MG tablet Take 2 tablets (10 mg) by mouth once as needed for anxiety Pre-procedural anxiolytic for MRI.  Please take 1 tab 30-60 minutes prior to MRI.  Take 2nd tab if anxiety or claustrophobia persists upon entering machine.  Avoid taking narcotics in conjunction with this medication.  Avoid alcohol intake 24 hours prior to and after taking this medication. (Patient not taking:  "Reported on 10/25/2021) 2 tablet 0         REVIEW OF SYSTEMS:    CONSTITUTIONAL:  No fevers, chills or sweats over the last week. Gaining some weight intentionally  Appetite is normal now   ENT:  No visual disturbance, earaches, epistaxis, sore throat.  ALLERGIES:  Negative.  RESPIRATORY:  No cough or hemoptysis.  CARDIOVASCULAR:  As per HPI.  GASTROINTESTINAL:  No nausea, vomiting, hematemesis, melena, hematochezia.  GENITOURINARY:  No urinary frequency, dysuria, hematuria.  SKIN:  Negative.  PSYCHIATRIC:  Stable   NEUROLOGIC:  Right foot drop.  ENDOCRINE:  Blood sugars are controlled.  MUSCULOSKELETAL:  Gout controlled     PHYSICAL EXAMINATION:    VITAL SIGNS:  /87 (BP Location: Left arm, Patient Position: Chair, Cuff Size: Adult Regular)   Pulse 111   Ht 1.727 m (5' 8\")   Wt 79.4 kg (175 lb)   SpO2 99%   BMI 26.61 kg/m      GENERAL:  The patient appears comfortable, in no acute distress.  He is getting stronger  HEENT:  Anicteric.  Extraocular movements intact.  Oropharynx clear.  No thrush.  NECK:  No thyromegaly or masses.    CARDIOVASCULAR:  Normal S1, S2.  JVP is less than 10.  PMI focal.  No rub.    LUNGS:  Clear to auscultation bilaterally.  No dullness to percussion and no areas of decreased breath sounds.  ABDOMEN:  Soft, nontender, nondistended.  EXTREMITIES:  No clubbing, cyanosis or edema.      LABORATORY DATA:     Data reviewed today demonstrate a creatinine of 2.2  Potassium 4.7    White blood cell count 4.4 hemoglobin 11.3 platelets 289    Immuneknow is pending     Donor specific antibodies negative.  He has not yet had a baseline angiogram due to renal dysfunction. Last hemodynamics were normal.      RHC.bx today         ASSESSMENT AND RECOMMENDATIONS:  In summary, this is a very pleasant 66-year-old man with a cardiac transplant after LVAD as bridge therapy on 05/06/2021 who has had a relatively complex postoperative course.  This is routine post-transplant followup.    I think he has " finally catching his stride post transplant.  His initial course was complicated b infections - he has not had rejection.  He also has ongoing renal dysfunction.  At this point I would be favoring running his immunosuppression on the lower side rather than the higher side.  We will repeat DSA's and an immuknow pending from today.  He also has another biopsy pending.  He also has low level EBV viremia in the past this is again pending today.     If the above testing looks like there is no rejection, no DSA's and it appears as though we are over immunosuppressing him I will lower his Tac goal to 4-6      I am not opposed to coming off of calcineurin inhibition entirely but I would like to wait until or after voriconazole before making adjustments to his immunosuppression to avoid wide swings again.     For now we will keep him on tacrolimus 5-7, CellCept 500 twice daily.  May lower pending the above results      Serostatus : CMV positive-positive, EBV positive-positive, toxo negative-negative.      Left upper lobe cavitary lesion.  He is currently on voriconazole and has ID followup scheduled.  Scheduled to come off this in a month we will need to watch his Prograf level closely after that    He has a mediastinal fluid collection that we are just following over time.  I suspect we will have another CT at some point.    Chronic kidney disease - seeing renal later this month- may come off of CNI - see above     Recent transaminitis, resolved    Recurrent gout flare.  Appreciate Rheumatology assistance as this limits his mobility.  He is on anakinra as well as allopurinol.    Neuropathy- stable     Polypharmacy.  Recently reviewed by pharmacy    For his emphysema, he is on several inhalers and follows with Pulmonary.      I am okay holding off on his baseline angiogram for now and it may be that we just do not get this before a year        For his deconditioning, he is making great strides.  Encouragement provided today  and I recommended that he do high intensity interval training settings on his.      RTC per protocol     Delisa Montgomery MD

## 2021-11-04 NOTE — PROGRESS NOTES
Pt arrived back from [facility] to 2A. Vitally stable ex HTN, denies pain. RIJ site C/D/I, negative for hematoma. Tolerating PO intake. Discharge instructions reviewed and all questions answered. Pt will drive self home as he did not receive sedation.

## 2021-11-04 NOTE — PRE-PROCEDURE
GENERAL PRE-PROCEDURE:   Procedure:  Right heart catheterization, endomyocardial biopsy  Date/Time:  11/4/2021 10:34 AM    Written consent obtained?: Yes    Risks and benefits: Risks, benefits and alternatives were discussed    DC Plan: Appropriate discharge home plan in place for patients who are going home after procedure   Consent given by:  Patient  Patient states understanding of procedure being performed: Yes    Patient's understanding of procedure matches consent: Yes    Procedure consent matches procedure scheduled: Yes    Appropriately NPO:  Yes  Normal work of breathing. No acute distress.   Labs reviewed.   Questions answered.     Nini Caraballo PA-C  [facility]

## 2021-11-04 NOTE — PROGRESS NOTES
Pt arrived for RHC. Vitally stable, ex tachy (baseline per pt). Denies pain. RIJ site covered with tegaderm. H&P reviewed. Consent still needs to be signed. Labs pending. Pt not receiving sedation, so will be driving self home.

## 2021-11-05 ENCOUNTER — TELEPHONE (OUTPATIENT)
Dept: TRANSPLANT | Facility: CLINIC | Age: 66
End: 2021-11-05

## 2021-11-05 DIAGNOSIS — Z94.1 TRANSPLANTED HEART (H): ICD-10-CM

## 2021-11-05 DIAGNOSIS — Z94.1 S/P ORTHOTOPIC HEART TRANSPLANT (H): ICD-10-CM

## 2021-11-05 DIAGNOSIS — Z94.1 TRANSPLANTED HEART (H): Primary | ICD-10-CM

## 2021-11-05 LAB
EBV DNA COPIES/ML, INSTRUMENT: 4813 COPIES/ML
EBV DNA SPEC NAA+PROBE-LOG#: 3.7 {LOG_COPIES}/ML
PATH REPORT.COMMENTS IMP SPEC: NORMAL
PATH REPORT.FINAL DX SPEC: NORMAL
PATH REPORT.GROSS SPEC: NORMAL
PATH REPORT.MICROSCOPIC SPEC OTHER STN: NORMAL
PATH REPORT.RELEVANT HX SPEC: NORMAL
PHOTO IMAGE: NORMAL

## 2021-11-05 RX ORDER — TACROLIMUS 1 MG/1
CAPSULE ORAL
Qty: 900 CAPSULE | Refills: 11 | COMMUNITY
Start: 2021-11-05 | End: 2021-12-09

## 2021-11-05 RX ORDER — TACROLIMUS 0.5 MG/1
CAPSULE ORAL
Qty: 270 CAPSULE | Refills: 3 | Status: SHIPPED | OUTPATIENT
Start: 2021-11-05 | End: 2021-12-04

## 2021-11-05 NOTE — TELEPHONE ENCOUNTER
Pt called to discuss tacro = 16.7. Goal 6-8. Current dose 1/1.5. confirmed 12 hour trough. Pt takes vori same time everyday. Last week he went without vori for a couple days, we believe this is why the spike. Pt instructed to decrease dose to 0.5 mg bid and repeat on Wednesday 11/10 at 915 in [place]. Standing orders in.

## 2021-11-08 LAB — IMMUKNOW IMMUNE CELL FUNCTION: 263 NG/ML

## 2021-11-10 LAB
ALLOMAP SCORE (EXTERNAL): 32 (ref 0–40)
NEGATIVE PREDICTIVE VALUE PERCENT (EXTERNAL): 98 %
POSITIVE PREDICTIVE VALUE PERCENT (EXTERNAL): 2.9 %

## 2021-11-11 ENCOUNTER — LAB (OUTPATIENT)
Dept: LAB | Facility: CLINIC | Age: 66
End: 2021-11-11
Payer: COMMERCIAL

## 2021-11-11 DIAGNOSIS — Z94.1 TRANSPLANTED HEART (H): ICD-10-CM

## 2021-11-11 DIAGNOSIS — Z94.1 HEART REPLACED BY TRANSPLANT (H): ICD-10-CM

## 2021-11-11 DIAGNOSIS — M10.9 GOUT: ICD-10-CM

## 2021-11-11 LAB
ANION GAP SERPL CALCULATED.3IONS-SCNC: 3 MMOL/L (ref 3–14)
BUN SERPL-MCNC: 31 MG/DL (ref 7–30)
CALCIUM SERPL-MCNC: 9.2 MG/DL (ref 8.5–10.1)
CHLORIDE BLD-SCNC: 110 MMOL/L (ref 94–109)
CO2 SERPL-SCNC: 24 MMOL/L (ref 20–32)
CREAT SERPL-MCNC: 2.13 MG/DL (ref 0.66–1.25)
ERYTHROCYTE [DISTWIDTH] IN BLOOD BY AUTOMATED COUNT: 14.7 % (ref 10–15)
GFR SERPL CREATININE-BSD FRML MDRD: 32 ML/MIN/1.73M2
GLUCOSE BLD-MCNC: 103 MG/DL (ref 70–99)
HCT VFR BLD AUTO: 35.8 % (ref 40–53)
HGB BLD-MCNC: 11.1 G/DL (ref 13.3–17.7)
MCH RBC QN AUTO: 30.2 PG (ref 26.5–33)
MCHC RBC AUTO-ENTMCNC: 31 G/DL (ref 31.5–36.5)
MCV RBC AUTO: 97 FL (ref 78–100)
PLATELET # BLD AUTO: 286 10E3/UL (ref 150–450)
POTASSIUM BLD-SCNC: 4.9 MMOL/L (ref 3.4–5.3)
RBC # BLD AUTO: 3.68 10E6/UL (ref 4.4–5.9)
SODIUM SERPL-SCNC: 137 MMOL/L (ref 133–144)
URATE SERPL-MCNC: 4 MG/DL (ref 3.5–7.2)
WBC # BLD AUTO: 4.8 10E3/UL (ref 4–11)

## 2021-11-11 PROCEDURE — 36415 COLL VENOUS BLD VENIPUNCTURE: CPT

## 2021-11-11 PROCEDURE — 86832 HLA CLASS I HIGH DEFIN QUAL: CPT

## 2021-11-11 PROCEDURE — 84550 ASSAY OF BLOOD/URIC ACID: CPT

## 2021-11-11 PROCEDURE — 80197 ASSAY OF TACROLIMUS: CPT

## 2021-11-11 PROCEDURE — 86833 HLA CLASS II HIGH DEFIN QUAL: CPT

## 2021-11-11 PROCEDURE — 80048 BASIC METABOLIC PNL TOTAL CA: CPT

## 2021-11-11 PROCEDURE — 85027 COMPLETE CBC AUTOMATED: CPT

## 2021-11-12 ENCOUNTER — TELEPHONE (OUTPATIENT)
Dept: TRANSPLANT | Facility: CLINIC | Age: 66
End: 2021-11-12
Payer: COMMERCIAL

## 2021-11-12 LAB
CMV DNA SPEC NAA+PROBE-ACNC: NOT DETECTED IU/ML
TACROLIMUS BLD-MCNC: 6.7 UG/L (ref 5–15)
TME LAST DOSE: NORMAL H
TME LAST DOSE: NORMAL H

## 2021-11-12 NOTE — TELEPHONE ENCOUNTER
Results called to patient  -Tacro level 6.7 (goal 6-8), ~12-hour trough, current dose 0.5 mg daily. No dose change.  *pt on vori until end of Nov, reminded pt to touch base with coordinator for tacro adjustment when coming off of vori  -BMP and CBC stable  -CMV not detected    Pt awaiting referral and scheduling for cardiac rehab.

## 2021-11-13 NOTE — PROGRESS NOTES
Assessment & Plan     1.  S/p orthotopic heart transplant on 5/5/2021.  Patient recovered well. On Tacrolimus.  No evidence of rejection  2.  Stage IIIb chronic kidney disease with history of WALTER post transplant.  Currently renal function stable with creatinine 2.13 GFR 32 measured on 11/11/2021  3.  Type 2 diabetes mellitus.  With severe weight loss since heart transplant, diabetes seems to have resolved.  I will have A1c checked with next blood draw and get back to him.  4.  Obstructive sleep apnea.  Patient still using CPAP.  I think he needs to be reassessed particularly with significant weight loss.  Will refer to sleep lab for consultation and reassessment.  5.  Chronic gout currently being treated with allopurinol 3 mg a day and also intermittent anakinra.  6.  Rotator cuff arthropathy of both shoulders with chronic pain.  Currently being managed with Tylenol and occasional use of tramadol.  Tramadol refill provided.  7.  Pulmonary cavitary lesion right upper lobe with Aspergillus fumigatus identified during bronchoscopy and been treated with voriconazole.  Supposed to be treated for 2 months.  Currently not symptomatic.  8.  Vitamin  B12 deficiency by history.  Currently not on B12.  Will check vitamin B12 level in the near future.  9.  Influenza vaccine provided.  10.  Mild anemia probably related to chronic kidney disease but currently on iron therapy for iron deficiency as well.  Most recent hemoglobin was 11.1 on 11/11/2021.  11.  COPD.  He is currently on Trelegy and albuterol as rescue inhaler.  Also on Singulair.  Doing well.  12.  Major depression-stable.  Patient to continue with bupropion.    Advised to follow-up in March.  A total of 50 minutes spent.  Reviewed chart extensively including multiple recent hospitalizations and transplant.  Review of test results and interpretation of test.  Patient visit and documentation.     BMI:   Estimated body mass index is 26.96 kg/m  as calculated from the  "following:    Height as of this encounter: 1.727 m (5' 8\").    Weight as of this encounter: 80.4 kg (177 lb 4.8 oz).           No follow-ups on file.    Ricardo Gómez MD  [facility] KOLTON Fernandez is a 66 year old who presents for the following health issues     History of Present Illness       COPD:  He presents for follow up of COPD.  Overall, COPD symptoms are better since last visit. He has less than usual fatigue or shortness of breath with exertion and no shortness of breath at rest.He rarely coughs and does not have change in sputum. No recent fever. He can walk 2-5 blocks without stopping to rest. He can walk 1 flights of stairs without resting.The patient has had no ED, urgent care, or hospital admissions because of COPD since the last visit.     Migraines:   Since the patient's last clinic visit, headaches are: no change  The patient is getting headaches:  Often  He is not able to do normal daily activities when he has a migraine.  The patient is taking the following rescue/relief medications:  Tylenol   Patient states \"I get only a small amount of relief\" from the rescue/relief medications.   The patient is taking the following medications to prevent migraines:  No medications to prevent migraines  In the past 4 weeks, the patient has gone to an Urgent Care or Emergency Room 0 times times due to headaches.He consumes 0 sweetened beverage(s) daily. He exercises with enough effort to increase his heart rate 3 or less days per week.   He is taking medications regularly.     66-year-old gentleman presents for follow-up.  I last saw him in January but since then he underwent heart transplant for heart failure on 5/5/2021.  This was complicated by a light pleural leak requiring prolonged chest tube further complicated by right lower lobe pneumonia with effusion requiring thoracentesis.    He had further complication of gout flareup requiring hospitalization.  A CT scan on " 7/13/2021 had shown anterior mediastinal fluid tracking and had developed elevated transaminases with questionable choledocholithiasis.  Eventually his transaminases normalized.  In August he was admitted with Covid 919 infection and was found to have left upper lobe cavitary lesion on CT scan.  His bronchoscopy at one time had shown Aspergillus.  He was started on voriconazole by infectious disease and is to continue for total of 2 months.  Currently he has no respiratory symptoms.  Denies shortness of breath.  He does have COPD and uses Trelegy and albuterol as rescue inhaler.    He lost a lot of weight.  He has had previous Sylvester-en-Y gastric bypass.  He has chronic kidney disease.  He did have WALTER at post cardiac transplant.  His blood sugars have been good.  He complains of poor balance but no orthostatic dizziness.  Poor strength.  For a while he had physical therapy at home.  He is scheduled to start cardiac rehab.  Prior to weight loss he was diagnosed with obstructive sleep apnea and has been using CPAP.    His biggest complaint right now is bilateral shoulder pain.  Using Tylenol on a as needed basis and tramadol once in a while.  He is getting by.  He feels his depression is well managed with bupropion.  Generalized weakness is slowly improving.        Review of Systems   Constitutional, HEENT, cardiovascular, pulmonary, GI, , musculoskeletal, neuro, skin, endocrine and psych systems are negative, except as otherwise noted.      Objective    There were no vitals taken for this visit.  There is no height or weight on file to calculate BMI.  Physical Exam   GENERAL: healthy, alert and no distress  EYES: Eyes grossly normal to inspection, PERRL and conjunctivae and sclerae normal  HENT: ear canals and TM's normal, nose and mouth without ulcers or lesions  NECK: no adenopathy, no asymmetry, masses, or scars and thyroid normal to palpation  RESP: lungs clear to auscultation - no rales, rhonchi or wheezes  CV:  regular rate and rhythm, normal S1 S2, no S3 or S4, no murmur, click or rub, no peripheral edema and peripheral pulses strong  ABDOMEN: soft, nontender, no hepatosplenomegaly, no masses and bowel sounds normal  MS: no gross musculoskeletal defects noted, no edema  SKIN: no suspicious lesions or rashes  NEURO: Normal strength and tone, mentation intact and speech normal  PSYCH: mentation appears normal, affect normal/bright

## 2021-11-18 ENCOUNTER — OFFICE VISIT (OUTPATIENT)
Dept: FAMILY MEDICINE | Facility: CLINIC | Age: 66
End: 2021-11-18
Payer: COMMERCIAL

## 2021-11-18 VITALS
BODY MASS INDEX: 26.87 KG/M2 | HEIGHT: 68 IN | TEMPERATURE: 98.7 F | DIASTOLIC BLOOD PRESSURE: 66 MMHG | OXYGEN SATURATION: 98 % | HEART RATE: 105 BPM | WEIGHT: 177.3 LBS | RESPIRATION RATE: 18 BRPM | SYSTOLIC BLOOD PRESSURE: 103 MMHG

## 2021-11-18 DIAGNOSIS — M12.812 ROTATOR CUFF TEAR ARTHROPATHY OF BOTH SHOULDERS: ICD-10-CM

## 2021-11-18 DIAGNOSIS — G47.33 OSA (OBSTRUCTIVE SLEEP APNEA): ICD-10-CM

## 2021-11-18 DIAGNOSIS — M75.102 ROTATOR CUFF TEAR ARTHROPATHY OF BOTH SHOULDERS: ICD-10-CM

## 2021-11-18 DIAGNOSIS — J44.9 CHRONIC OBSTRUCTIVE PULMONARY DISEASE, UNSPECIFIED COPD TYPE (H): ICD-10-CM

## 2021-11-18 DIAGNOSIS — M1A.3790 CHRONIC GOUT DUE TO RENAL IMPAIRMENT INVOLVING FOOT WITHOUT TOPHUS, UNSPECIFIED LATERALITY: ICD-10-CM

## 2021-11-18 DIAGNOSIS — Z94.1 S/P ORTHOTOPIC HEART TRANSPLANT (H): Primary | ICD-10-CM

## 2021-11-18 DIAGNOSIS — M12.811 ROTATOR CUFF TEAR ARTHROPATHY OF BOTH SHOULDERS: ICD-10-CM

## 2021-11-18 DIAGNOSIS — F33.9 MAJOR DEPRESSION, RECURRENT, CHRONIC (H): ICD-10-CM

## 2021-11-18 DIAGNOSIS — J98.4 PULMONARY CAVITARY LESION: ICD-10-CM

## 2021-11-18 DIAGNOSIS — E53.8 VITAMIN B12 DEFICIENCY (NON ANEMIC): ICD-10-CM

## 2021-11-18 DIAGNOSIS — Z23 NEED FOR PROPHYLACTIC VACCINATION AND INOCULATION AGAINST INFLUENZA: ICD-10-CM

## 2021-11-18 DIAGNOSIS — E11.8 TYPE 2 DIABETES MELLITUS WITH COMPLICATION, WITH LONG-TERM CURRENT USE OF INSULIN (H): ICD-10-CM

## 2021-11-18 DIAGNOSIS — D50.9 IRON DEFICIENCY ANEMIA, UNSPECIFIED IRON DEFICIENCY ANEMIA TYPE: ICD-10-CM

## 2021-11-18 DIAGNOSIS — M75.101 ROTATOR CUFF TEAR ARTHROPATHY OF BOTH SHOULDERS: ICD-10-CM

## 2021-11-18 DIAGNOSIS — N18.32 STAGE 3B CHRONIC KIDNEY DISEASE (H): ICD-10-CM

## 2021-11-18 DIAGNOSIS — Z79.4 TYPE 2 DIABETES MELLITUS WITH COMPLICATION, WITH LONG-TERM CURRENT USE OF INSULIN (H): ICD-10-CM

## 2021-11-18 PROBLEM — I31.39 PERICARDIAL EFFUSION: Status: RESOLVED | Noted: 2021-07-08 | Resolved: 2021-11-18

## 2021-11-18 PROBLEM — N17.9 AKI (ACUTE KIDNEY INJURY) (H): Status: RESOLVED | Noted: 2020-08-05 | Resolved: 2021-11-18

## 2021-11-18 PROBLEM — G56.01 RIGHT CARPAL TUNNEL SYNDROME: Status: RESOLVED | Noted: 2021-01-13 | Resolved: 2021-11-18

## 2021-11-18 PROBLEM — R06.02 SHORTNESS OF BREATH: Status: RESOLVED | Noted: 2020-08-05 | Resolved: 2021-11-18

## 2021-11-18 PROBLEM — R50.9 FEVER IN ADULT: Status: RESOLVED | Noted: 2021-08-24 | Resolved: 2021-11-18
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
## 2021-11-18 PROBLEM — R00.0 SINUS TACHYCARDIA: Status: RESOLVED | Noted: 2021-08-24 | Resolved: 2021-11-18

## 2021-11-18 PROBLEM — Z79.01 LONG TERM CURRENT USE OF ANTICOAGULANT THERAPY: Status: RESOLVED | Noted: 2017-06-26 | Resolved: 2021-11-18

## 2021-11-18 PROBLEM — E66.01 CLASS 2 SEVERE OBESITY DUE TO EXCESS CALORIES WITH SERIOUS COMORBIDITY AND BODY MASS INDEX (BMI) OF 35.0 TO 35.9 IN ADULT (H): Status: RESOLVED | Noted: 2020-11-02 | Resolved: 2021-11-18

## 2021-11-18 PROBLEM — I48.0 PAROXYSMAL ATRIAL FIBRILLATION (H): Status: RESOLVED | Noted: 2017-05-11 | Resolved: 2021-11-18

## 2021-11-18 PROBLEM — I47.29 PAROXYSMAL VT (H): Status: RESOLVED | Noted: 2017-05-11 | Resolved: 2021-11-18

## 2021-11-18 PROBLEM — E66.812 CLASS 2 SEVERE OBESITY DUE TO EXCESS CALORIES WITH SERIOUS COMORBIDITY AND BODY MASS INDEX (BMI) OF 35.0 TO 35.9 IN ADULT (H): Status: RESOLVED | Noted: 2020-11-02 | Resolved: 2021-11-18

## 2021-11-18 PROCEDURE — 99215 OFFICE O/P EST HI 40 MIN: CPT | Mod: 25 | Performed by: INTERNAL MEDICINE

## 2021-11-18 PROCEDURE — 90682 RIV4 VACC RECOMBINANT DNA IM: CPT | Performed by: INTERNAL MEDICINE

## 2021-11-18 PROCEDURE — 90471 IMMUNIZATION ADMIN: CPT | Performed by: INTERNAL MEDICINE

## 2021-11-18 RX ORDER — MONTELUKAST SODIUM 10 MG/1
10 TABLET ORAL AT BEDTIME
Qty: 90 TABLET | Refills: 1 | Status: SHIPPED | OUTPATIENT
Start: 2021-11-18 | End: 2022-01-03

## 2021-11-18 RX ORDER — TRAMADOL HYDROCHLORIDE 50 MG/1
50-100 TABLET ORAL EVERY 6 HOURS PRN
Qty: 60 TABLET | Refills: 1 | Status: SHIPPED | OUTPATIENT
Start: 2021-11-18 | End: 2022-02-15

## 2021-11-18 RX ORDER — ALLOPURINOL 300 MG/1
300 TABLET ORAL DAILY
Qty: 90 TABLET | Refills: 1 | Status: SHIPPED | OUTPATIENT
Start: 2021-11-18 | End: 2022-09-27

## 2021-11-18 RX ORDER — ALBUTEROL SULFATE 90 UG/1
AEROSOL, METERED RESPIRATORY (INHALATION)
Qty: 18 G | Refills: 11 | Status: SHIPPED | OUTPATIENT
Start: 2021-11-18 | End: 2022-09-27

## 2021-11-18 RX ORDER — BUPROPION HYDROCHLORIDE 75 MG/1
75 TABLET ORAL 2 TIMES DAILY
Qty: 180 TABLET | Refills: 1 | Status: SHIPPED | OUTPATIENT
Start: 2021-11-18 | End: 2022-08-25

## 2021-11-18 RX ORDER — GABAPENTIN 300 MG/1
300 CAPSULE ORAL 3 TIMES DAILY
Qty: 60 CAPSULE | Refills: 1 | Status: CANCELLED | OUTPATIENT
Start: 2021-11-18

## 2021-11-18 ASSESSMENT — MIFFLIN-ST. JEOR: SCORE: 1568.73

## 2021-11-18 ASSESSMENT — PAIN SCALES - GENERAL: PAINLEVEL: SEVERE PAIN (6)

## 2021-11-22 ENCOUNTER — TELEPHONE (OUTPATIENT)
Dept: TRANSPLANT | Facility: CLINIC | Age: 66
End: 2021-11-22
Payer: COMMERCIAL

## 2021-11-22 DIAGNOSIS — Z94.1 TRANSPLANTED HEART (H): Primary | ICD-10-CM

## 2021-11-23 ENCOUNTER — TELEPHONE (OUTPATIENT)
Dept: TRANSPLANT | Facility: CLINIC | Age: 66
End: 2021-11-23

## 2021-11-23 ENCOUNTER — LAB (OUTPATIENT)
Dept: LAB | Facility: CLINIC | Age: 66
End: 2021-11-23
Payer: COMMERCIAL

## 2021-11-23 DIAGNOSIS — E11.8 TYPE 2 DIABETES MELLITUS WITH COMPLICATION, WITH LONG-TERM CURRENT USE OF INSULIN (H): ICD-10-CM

## 2021-11-23 DIAGNOSIS — Z94.1 TRANSPLANTED HEART (H): ICD-10-CM

## 2021-11-23 DIAGNOSIS — Z79.4 TYPE 2 DIABETES MELLITUS WITH COMPLICATION, WITH LONG-TERM CURRENT USE OF INSULIN (H): ICD-10-CM

## 2021-11-23 DIAGNOSIS — E53.8 VITAMIN B12 DEFICIENCY (NON ANEMIC): ICD-10-CM

## 2021-11-23 LAB
ANION GAP SERPL CALCULATED.3IONS-SCNC: 5 MMOL/L (ref 3–14)
BUN SERPL-MCNC: 45 MG/DL (ref 7–30)
CALCIUM SERPL-MCNC: 9.4 MG/DL (ref 8.5–10.1)
CHLORIDE BLD-SCNC: 105 MMOL/L (ref 94–109)
CO2 SERPL-SCNC: 24 MMOL/L (ref 20–32)
CREAT SERPL-MCNC: 2.25 MG/DL (ref 0.66–1.25)
ERYTHROCYTE [DISTWIDTH] IN BLOOD BY AUTOMATED COUNT: 13.8 % (ref 10–15)
GFR SERPL CREATININE-BSD FRML MDRD: 30 ML/MIN/1.73M2
GLUCOSE BLD-MCNC: 111 MG/DL (ref 70–99)
HBA1C MFR BLD: 5 % (ref 0–5.6)
HCT VFR BLD AUTO: 38.5 % (ref 40–53)
HGB BLD-MCNC: 12.1 G/DL (ref 13.3–17.7)
MCH RBC QN AUTO: 29.9 PG (ref 26.5–33)
MCHC RBC AUTO-ENTMCNC: 31.4 G/DL (ref 31.5–36.5)
MCV RBC AUTO: 95 FL (ref 78–100)
PLATELET # BLD AUTO: 309 10E3/UL (ref 150–450)
POTASSIUM BLD-SCNC: 4.8 MMOL/L (ref 3.4–5.3)
RBC # BLD AUTO: 4.05 10E6/UL (ref 4.4–5.9)
SODIUM SERPL-SCNC: 134 MMOL/L (ref 133–144)
TACROLIMUS BLD-MCNC: 6.2 UG/L (ref 5–15)
TME LAST DOSE: NORMAL H
TME LAST DOSE: NORMAL H
VIT B12 SERPL-MCNC: 436 PG/ML (ref 193–986)
WBC # BLD AUTO: 5.7 10E3/UL (ref 4–11)

## 2021-11-23 PROCEDURE — 82607 VITAMIN B-12: CPT

## 2021-11-23 PROCEDURE — 36415 COLL VENOUS BLD VENIPUNCTURE: CPT

## 2021-11-23 PROCEDURE — 80048 BASIC METABOLIC PNL TOTAL CA: CPT

## 2021-11-23 PROCEDURE — 85027 COMPLETE CBC AUTOMATED: CPT

## 2021-11-23 PROCEDURE — 83036 HEMOGLOBIN GLYCOSYLATED A1C: CPT

## 2021-11-23 PROCEDURE — 80197 ASSAY OF TACROLIMUS: CPT

## 2021-11-23 NOTE — TELEPHONE ENCOUNTER
1/22. Pt called stating he has a headache. He has some tension in between his shoulders that is not new, but seems to be more bothersome lately. Pt is taking ultram to help. Pt is also taking tylenol with some headache relief. Writer suggested on hydrating and getting labs on 11/23. Pt verbalized understanding and agrees with the plan.

## 2021-11-30 ENCOUNTER — TELEPHONE (OUTPATIENT)
Dept: CARDIOLOGY | Facility: CLINIC | Age: 66
End: 2021-11-30

## 2021-11-30 ENCOUNTER — TELEPHONE (OUTPATIENT)
Dept: RHEUMATOLOGY | Facility: CLINIC | Age: 66
End: 2021-11-30

## 2021-11-30 DIAGNOSIS — Z94.1 TRANSPLANTED HEART (H): Primary | ICD-10-CM

## 2021-11-30 NOTE — TELEPHONE ENCOUNTER
[employer] sent patient application for prescription financial assistance. Please call Ph: [phone] if application has not been received.

## 2021-11-30 NOTE — TELEPHONE ENCOUNTER
Call attempted again to patient. Pt answer. Pt confirms CT of head w/o contrast. Requesting [place] location. appt moved to [place]. Pt verbalized understanding.

## 2021-11-30 NOTE — PROGRESS NOTES
After reviewing symptoms with Dr. Montgomery. A CT of head without contrast ordered to evaluate headaches. CT setup for 12/2. Pt called to communicate plan, no answer. Unable to leave a VM.

## 2021-12-01 ENCOUNTER — ANCILLARY PROCEDURE (OUTPATIENT)
Dept: CT IMAGING | Facility: CLINIC | Age: 66
End: 2021-12-01
Attending: INTERNAL MEDICINE
Payer: COMMERCIAL

## 2021-12-01 DIAGNOSIS — Z94.1 TRANSPLANTED HEART (H): ICD-10-CM

## 2021-12-01 PROCEDURE — 70450 CT HEAD/BRAIN W/O DYE: CPT | Mod: GC | Performed by: RADIOLOGY

## 2021-12-02 NOTE — TELEPHONE ENCOUNTER
Application for [employer] (all pages except patient portion that requires patient signature) faxed to [email or web address]

## 2021-12-03 ENCOUNTER — LAB (OUTPATIENT)
Dept: LAB | Facility: CLINIC | Age: 66
End: 2021-12-03
Payer: COMMERCIAL

## 2021-12-03 DIAGNOSIS — E11.8 TYPE 2 DIABETES MELLITUS WITH COMPLICATION, WITH LONG-TERM CURRENT USE OF INSULIN (H): ICD-10-CM

## 2021-12-03 DIAGNOSIS — Z94.1 TRANSPLANTED HEART (H): ICD-10-CM

## 2021-12-03 DIAGNOSIS — Z79.4 TYPE 2 DIABETES MELLITUS WITH COMPLICATION, WITH LONG-TERM CURRENT USE OF INSULIN (H): ICD-10-CM

## 2021-12-03 LAB
ANION GAP SERPL CALCULATED.3IONS-SCNC: 7 MMOL/L (ref 3–14)
BUN SERPL-MCNC: 35 MG/DL (ref 7–30)
CALCIUM SERPL-MCNC: 9.1 MG/DL (ref 8.5–10.1)
CHLORIDE BLD-SCNC: 105 MMOL/L (ref 94–109)
CO2 SERPL-SCNC: 21 MMOL/L (ref 20–32)
CREAT SERPL-MCNC: 1.42 MG/DL (ref 0.66–1.25)
ERYTHROCYTE [DISTWIDTH] IN BLOOD BY AUTOMATED COUNT: 13.3 % (ref 10–15)
GFR SERPL CREATININE-BSD FRML MDRD: 52 ML/MIN/1.73M2
GLUCOSE BLD-MCNC: 113 MG/DL (ref 70–99)
HBA1C MFR BLD: 5.4 % (ref 0–5.6)
HCT VFR BLD AUTO: 37.4 % (ref 40–53)
HGB BLD-MCNC: 11.8 G/DL (ref 13.3–17.7)
MCH RBC QN AUTO: 30.3 PG (ref 26.5–33)
MCHC RBC AUTO-ENTMCNC: 31.6 G/DL (ref 31.5–36.5)
MCV RBC AUTO: 96 FL (ref 78–100)
PLATELET # BLD AUTO: 290 10E3/UL (ref 150–450)
POTASSIUM BLD-SCNC: 4.3 MMOL/L (ref 3.4–5.3)
RBC # BLD AUTO: 3.9 10E6/UL (ref 4.4–5.9)
SODIUM SERPL-SCNC: 133 MMOL/L (ref 133–144)
TACROLIMUS BLD-MCNC: <3 UG/L (ref 5–15)
TME LAST DOSE: ABNORMAL H
TME LAST DOSE: ABNORMAL H
WBC # BLD AUTO: 5.2 10E3/UL (ref 4–11)

## 2021-12-03 PROCEDURE — 80048 BASIC METABOLIC PNL TOTAL CA: CPT

## 2021-12-03 PROCEDURE — 83036 HEMOGLOBIN GLYCOSYLATED A1C: CPT

## 2021-12-03 PROCEDURE — 85027 COMPLETE CBC AUTOMATED: CPT

## 2021-12-03 PROCEDURE — 36415 COLL VENOUS BLD VENIPUNCTURE: CPT

## 2021-12-03 PROCEDURE — 80197 ASSAY OF TACROLIMUS: CPT

## 2021-12-04 DIAGNOSIS — Z94.1 TRANSPLANTED HEART (H): ICD-10-CM

## 2021-12-04 RX ORDER — TACROLIMUS 0.5 MG/1
CAPSULE ORAL
Qty: 270 CAPSULE | Refills: 3 | Status: SHIPPED | OUTPATIENT
Start: 2021-12-04 | End: 2021-12-06

## 2021-12-04 NOTE — RESULT ENCOUNTER NOTE
Tac level <3. Confirmed 12 hour trough. Goal 6-8. Pt recently stopped vori. Current dose 0.5 bid. Increase to 1 mg bid. Repeat in 1 week.

## 2021-12-06 ENCOUNTER — TELEPHONE (OUTPATIENT)
Dept: TRANSPLANT | Facility: CLINIC | Age: 66
End: 2021-12-06
Payer: COMMERCIAL

## 2021-12-06 DIAGNOSIS — Z94.1 TRANSPLANTED HEART (H): ICD-10-CM

## 2021-12-06 RX ORDER — TACROLIMUS 0.5 MG/1
CAPSULE ORAL
Qty: 540 CAPSULE | Refills: 3 | Status: ON HOLD | COMMUNITY
Start: 2021-12-06 | End: 2021-12-13

## 2021-12-06 NOTE — TELEPHONE ENCOUNTER
Dr. Montgomery reviewed recent tac increase and she thinks we should increase higher. Dose increased from 1 mg bid to 1.5 mg bid. Recheck this week as scheduled. Pt notified and verbalized understanding of adjustment.

## 2021-12-08 ENCOUNTER — LAB (OUTPATIENT)
Dept: LAB | Facility: CLINIC | Age: 66
End: 2021-12-08
Payer: COMMERCIAL

## 2021-12-08 DIAGNOSIS — Z94.1 TRANSPLANTED HEART (H): ICD-10-CM

## 2021-12-08 LAB
ANION GAP SERPL CALCULATED.3IONS-SCNC: 5 MMOL/L (ref 3–14)
BUN SERPL-MCNC: 32 MG/DL (ref 7–30)
CALCIUM SERPL-MCNC: 8.6 MG/DL (ref 8.5–10.1)
CHLORIDE BLD-SCNC: 110 MMOL/L (ref 94–109)
CO2 SERPL-SCNC: 24 MMOL/L (ref 20–32)
CREAT SERPL-MCNC: 1.6 MG/DL (ref 0.66–1.25)
ERYTHROCYTE [DISTWIDTH] IN BLOOD BY AUTOMATED COUNT: 13.3 % (ref 10–15)
GFR SERPL CREATININE-BSD FRML MDRD: 45 ML/MIN/1.73M2
GLUCOSE BLD-MCNC: 107 MG/DL (ref 70–99)
HCT VFR BLD AUTO: 35.2 % (ref 40–53)
HGB BLD-MCNC: 11.2 G/DL (ref 13.3–17.7)
MCH RBC QN AUTO: 30.4 PG (ref 26.5–33)
MCHC RBC AUTO-ENTMCNC: 31.8 G/DL (ref 31.5–36.5)
MCV RBC AUTO: 96 FL (ref 78–100)
PLATELET # BLD AUTO: 292 10E3/UL (ref 150–450)
POTASSIUM BLD-SCNC: 4.1 MMOL/L (ref 3.4–5.3)
RBC # BLD AUTO: 3.68 10E6/UL (ref 4.4–5.9)
SODIUM SERPL-SCNC: 139 MMOL/L (ref 133–144)
TACROLIMUS BLD-MCNC: <3 UG/L (ref 5–15)
TME LAST DOSE: ABNORMAL H
TME LAST DOSE: ABNORMAL H
WBC # BLD AUTO: 5.3 10E3/UL (ref 4–11)

## 2021-12-08 PROCEDURE — 80048 BASIC METABOLIC PNL TOTAL CA: CPT

## 2021-12-08 PROCEDURE — 36415 COLL VENOUS BLD VENIPUNCTURE: CPT

## 2021-12-08 PROCEDURE — 80197 ASSAY OF TACROLIMUS: CPT

## 2021-12-08 PROCEDURE — 85027 COMPLETE CBC AUTOMATED: CPT

## 2021-12-09 ENCOUNTER — TELEPHONE (OUTPATIENT)
Dept: TRANSPLANT | Facility: CLINIC | Age: 66
End: 2021-12-09
Payer: COMMERCIAL

## 2021-12-09 DIAGNOSIS — Z94.1 S/P ORTHOTOPIC HEART TRANSPLANT (H): ICD-10-CM

## 2021-12-09 RX ORDER — TACROLIMUS 1 MG/1
CAPSULE ORAL
Qty: 900 CAPSULE | Refills: 11 | Status: ON HOLD | OUTPATIENT
Start: 2021-12-09 | End: 2021-12-13

## 2021-12-09 NOTE — TELEPHONE ENCOUNTER
Tac level <3. After discussing with Dr. Montgomery, pt called and instructed to take 3 mg tonight. Recheck tacro level tomorrow (appt made for 840 at [facility]). Then take 2 mg after blood draw. Pt verbalized understanding.

## 2021-12-09 NOTE — TELEPHONE ENCOUNTER
Pt called to discuss recent tac level <3 after increasing to 1.5 mg bid. No answer. Unable to leave a VM.

## 2021-12-09 NOTE — TELEPHONE ENCOUNTER
Returning Call: General    Reason for call: Patient returning call for [place], he is unsure what is wrong with his VM but is aware of issue.     Call back needed? Yes    Return Call Needed

## 2021-12-10 ENCOUNTER — LAB (OUTPATIENT)
Dept: LAB | Facility: CLINIC | Age: 66
End: 2021-12-10
Payer: COMMERCIAL

## 2021-12-10 DIAGNOSIS — Z94.1 TRANSPLANTED HEART (H): ICD-10-CM

## 2021-12-10 LAB
TACROLIMUS BLD-MCNC: <3 UG/L (ref 5–15)
TME LAST DOSE: ABNORMAL H
TME LAST DOSE: ABNORMAL H

## 2021-12-10 PROCEDURE — 36415 COLL VENOUS BLD VENIPUNCTURE: CPT

## 2021-12-10 PROCEDURE — 80197 ASSAY OF TACROLIMUS: CPT

## 2021-12-11 ENCOUNTER — TELEPHONE (OUTPATIENT)
Dept: TRANSPLANT | Facility: CLINIC | Age: 66
End: 2021-12-11
Payer: COMMERCIAL

## 2021-12-11 ENCOUNTER — HOSPITAL ENCOUNTER (INPATIENT)
Facility: CLINIC | Age: 66
LOS: 2 days | Discharge: HOME | DRG: 949 | End: 2021-12-13
Attending: INTERNAL MEDICINE | Admitting: INTERNAL MEDICINE
Payer: COMMERCIAL

## 2021-12-11 DIAGNOSIS — Z79.621 ENCOUNTER FOR MONITORING TACROLIMUS THERAPY: ICD-10-CM

## 2021-12-11 DIAGNOSIS — Z20.822 LAB TEST NEGATIVE FOR COVID-19 VIRUS: ICD-10-CM

## 2021-12-11 DIAGNOSIS — Z51.81 ENCOUNTER FOR MONITORING TACROLIMUS THERAPY: ICD-10-CM

## 2021-12-11 DIAGNOSIS — Z94.1 S/P ORTHOTOPIC HEART TRANSPLANT (H): ICD-10-CM

## 2021-12-11 LAB
ALBUMIN SERPL-MCNC: 3.6 G/DL (ref 3.4–5)
ALP SERPL-CCNC: 123 U/L (ref 40–150)
ALT SERPL W P-5'-P-CCNC: 22 U/L (ref 0–70)
ANION GAP SERPL CALCULATED.3IONS-SCNC: 5 MMOL/L (ref 3–14)
AST SERPL W P-5'-P-CCNC: 19 U/L (ref 0–45)
BASOPHILS # BLD AUTO: 0.1 10E3/UL (ref 0–0.2)
BASOPHILS NFR BLD AUTO: 1 %
BILIRUB SERPL-MCNC: 0.3 MG/DL (ref 0.2–1.3)
BUN SERPL-MCNC: 24 MG/DL (ref 7–30)
CALCIUM SERPL-MCNC: 8.8 MG/DL (ref 8.5–10.1)
CHLORIDE BLD-SCNC: 110 MMOL/L (ref 94–109)
CO2 SERPL-SCNC: 21 MMOL/L (ref 20–32)
CREAT SERPL-MCNC: 1.19 MG/DL (ref 0.66–1.25)
EOSINOPHIL # BLD AUTO: 0.4 10E3/UL (ref 0–0.7)
EOSINOPHIL NFR BLD AUTO: 5 %
ERYTHROCYTE [DISTWIDTH] IN BLOOD BY AUTOMATED COUNT: 13.5 % (ref 10–15)
GFR SERPL CREATININE-BSD FRML MDRD: 64 ML/MIN/1.73M2
GLUCOSE BLD-MCNC: 94 MG/DL (ref 70–99)
HCT VFR BLD AUTO: 35.6 % (ref 40–53)
HGB BLD-MCNC: 11.2 G/DL (ref 13.3–17.7)
IMM GRANULOCYTES # BLD: 0 10E3/UL
IMM GRANULOCYTES NFR BLD: 0 %
LYMPHOCYTES # BLD AUTO: 1.6 10E3/UL (ref 0.8–5.3)
LYMPHOCYTES NFR BLD AUTO: 22 %
MAGNESIUM SERPL-MCNC: 2.1 MG/DL (ref 1.6–2.3)
MCH RBC QN AUTO: 30.3 PG (ref 26.5–33)
MCHC RBC AUTO-ENTMCNC: 31.5 G/DL (ref 31.5–36.5)
MCV RBC AUTO: 96 FL (ref 78–100)
MONOCYTES # BLD AUTO: 0.7 10E3/UL (ref 0–1.3)
MONOCYTES NFR BLD AUTO: 10 %
NEUTROPHILS # BLD AUTO: 4.4 10E3/UL (ref 1.6–8.3)
NEUTROPHILS NFR BLD AUTO: 62 %
NRBC # BLD AUTO: 0 10E3/UL
NRBC BLD AUTO-RTO: 0 /100
PLATELET # BLD AUTO: 264 10E3/UL (ref 150–450)
POTASSIUM BLD-SCNC: 4.5 MMOL/L (ref 3.4–5.3)
PROT SERPL-MCNC: 7 G/DL (ref 6.8–8.8)
RBC # BLD AUTO: 3.7 10E6/UL (ref 4.4–5.9)
SARS-COV-2 RNA RESP QL NAA+PROBE: NEGATIVE
SODIUM SERPL-SCNC: 136 MMOL/L (ref 133–144)
WBC # BLD AUTO: 7.1 10E3/UL (ref 4–11)

## 2021-12-11 PROCEDURE — 96365 THER/PROPH/DIAG IV INF INIT: CPT | Performed by: EMERGENCY MEDICINE

## 2021-12-11 PROCEDURE — 99285 EMERGENCY DEPT VISIT HI MDM: CPT | Mod: 25 | Performed by: EMERGENCY MEDICINE

## 2021-12-11 PROCEDURE — 80053 COMPREHEN METABOLIC PANEL: CPT | Performed by: EMERGENCY MEDICINE

## 2021-12-11 PROCEDURE — 250N000013 HC RX MED GY IP 250 OP 250 PS 637: Performed by: STUDENT IN AN ORGANIZED HEALTH CARE EDUCATION/TRAINING PROGRAM

## 2021-12-11 PROCEDURE — 36415 COLL VENOUS BLD VENIPUNCTURE: CPT | Performed by: EMERGENCY MEDICINE

## 2021-12-11 PROCEDURE — 250N000011 HC RX IP 250 OP 636

## 2021-12-11 PROCEDURE — 99285 EMERGENCY DEPT VISIT HI MDM: CPT | Performed by: EMERGENCY MEDICINE

## 2021-12-11 PROCEDURE — 258N000003 HC RX IP 258 OP 636

## 2021-12-11 PROCEDURE — 83735 ASSAY OF MAGNESIUM: CPT

## 2021-12-11 PROCEDURE — 250N000012 HC RX MED GY IP 250 OP 636 PS 637: Performed by: INTERNAL MEDICINE

## 2021-12-11 PROCEDURE — 250N000013 HC RX MED GY IP 250 OP 250 PS 637

## 2021-12-11 PROCEDURE — 96366 THER/PROPH/DIAG IV INF ADDON: CPT | Performed by: EMERGENCY MEDICINE

## 2021-12-11 PROCEDURE — 120N000003 HC R&B IMCU UMMC

## 2021-12-11 PROCEDURE — 80197 ASSAY OF TACROLIMUS: CPT | Performed by: EMERGENCY MEDICINE

## 2021-12-11 PROCEDURE — C9803 HOPD COVID-19 SPEC COLLECT: HCPCS | Performed by: EMERGENCY MEDICINE

## 2021-12-11 PROCEDURE — U0005 INFEC AGEN DETEC AMPLI PROBE: HCPCS

## 2021-12-11 PROCEDURE — 85025 COMPLETE CBC W/AUTO DIFF WBC: CPT | Performed by: EMERGENCY MEDICINE

## 2021-12-11 PROCEDURE — 99223 1ST HOSP IP/OBS HIGH 75: CPT | Mod: GC | Performed by: INTERNAL MEDICINE

## 2021-12-11 RX ORDER — ASPIRIN 81 MG/1
81 TABLET, CHEWABLE ORAL DAILY
Status: DISCONTINUED | OUTPATIENT
Start: 2021-12-12 | End: 2021-12-13 | Stop reason: HOSPADM

## 2021-12-11 RX ORDER — HEPARIN SODIUM 5000 [USP'U]/.5ML
5000 INJECTION, SOLUTION INTRAVENOUS; SUBCUTANEOUS EVERY 12 HOURS
Status: DISCONTINUED | OUTPATIENT
Start: 2021-12-11 | End: 2021-12-13 | Stop reason: HOSPADM

## 2021-12-11 RX ORDER — MYCOPHENOLATE MOFETIL 250 MG/1
500 CAPSULE ORAL
Status: DISCONTINUED | OUTPATIENT
Start: 2021-12-11 | End: 2021-12-13 | Stop reason: HOSPADM

## 2021-12-11 RX ORDER — MYCOPHENOLATE MOFETIL 250 MG/1
500 CAPSULE ORAL 2 TIMES DAILY
Status: DISCONTINUED | OUTPATIENT
Start: 2021-12-11 | End: 2021-12-11

## 2021-12-11 RX ORDER — FERROUS SULFATE 325(65) MG
325 TABLET ORAL
Status: DISCONTINUED | OUTPATIENT
Start: 2021-12-12 | End: 2021-12-11

## 2021-12-11 RX ORDER — TRAMADOL HYDROCHLORIDE 50 MG/1
50-100 TABLET ORAL EVERY 6 HOURS PRN
Status: DISCONTINUED | OUTPATIENT
Start: 2021-12-11 | End: 2021-12-13 | Stop reason: HOSPADM

## 2021-12-11 RX ORDER — ALBUTEROL SULFATE 90 UG/1
2 AEROSOL, METERED RESPIRATORY (INHALATION) EVERY 4 HOURS PRN
Status: DISCONTINUED | OUTPATIENT
Start: 2021-12-11 | End: 2021-12-13 | Stop reason: HOSPADM

## 2021-12-11 RX ORDER — ALLOPURINOL 300 MG/1
300 TABLET ORAL DAILY
Status: DISCONTINUED | OUTPATIENT
Start: 2021-12-12 | End: 2021-12-13 | Stop reason: HOSPADM

## 2021-12-11 RX ORDER — PANTOPRAZOLE SODIUM 40 MG/1
40 TABLET, DELAYED RELEASE ORAL
Status: DISCONTINUED | OUTPATIENT
Start: 2021-12-12 | End: 2021-12-13 | Stop reason: HOSPADM

## 2021-12-11 RX ORDER — MONTELUKAST SODIUM 10 MG/1
10 TABLET ORAL AT BEDTIME
Status: DISCONTINUED | OUTPATIENT
Start: 2021-12-11 | End: 2021-12-13 | Stop reason: HOSPADM

## 2021-12-11 RX ORDER — ACETAMINOPHEN 325 MG/1
975 TABLET ORAL EVERY 6 HOURS PRN
Status: DISCONTINUED | OUTPATIENT
Start: 2021-12-11 | End: 2021-12-13 | Stop reason: HOSPADM

## 2021-12-11 RX ORDER — BUPROPION HYDROCHLORIDE 75 MG/1
75 TABLET ORAL 2 TIMES DAILY
Status: DISCONTINUED | OUTPATIENT
Start: 2021-12-11 | End: 2021-12-13 | Stop reason: HOSPADM

## 2021-12-11 RX ORDER — FERROUS SULFATE 325(65) MG
325 TABLET ORAL
Status: DISCONTINUED | OUTPATIENT
Start: 2021-12-12 | End: 2021-12-13 | Stop reason: HOSPADM

## 2021-12-11 RX ADMIN — HEPARIN SODIUM 5000 UNITS: 10000 INJECTION, SOLUTION INTRAVENOUS; SUBCUTANEOUS at 20:44

## 2021-12-11 RX ADMIN — Medication 10 MG: at 22:08

## 2021-12-11 RX ADMIN — MONTELUKAST 10 MG: 10 TABLET, FILM COATED ORAL at 22:07

## 2021-12-11 RX ADMIN — DEXTROSE MONOHYDRATE 83 MCG/HR: 5 INJECTION, SOLUTION INTRAVENOUS at 16:44

## 2021-12-11 RX ADMIN — Medication 1 TABLET: at 20:44

## 2021-12-11 RX ADMIN — ACETAMINOPHEN 975 MG: 325 TABLET, FILM COATED ORAL at 21:00

## 2021-12-11 RX ADMIN — MYCOPHENOLATE MOFETIL 500 MG: 250 CAPSULE ORAL at 21:00

## 2021-12-11 RX ADMIN — BUPROPION HYDROCHLORIDE 75 MG: 75 TABLET, FILM COATED ORAL at 20:44

## 2021-12-11 ASSESSMENT — ACTIVITIES OF DAILY LIVING (ADL)
HEARING_DIFFICULTY_OR_DEAF: NO
DIFFICULTY_COMMUNICATING: NO
ADLS_ACUITY_SCORE: 7
DIFFICULTY_EATING/SWALLOWING: NO
DRESSING/BATHING_DIFFICULTY: NO
WEAR_GLASSES_OR_BLIND: YES
ADLS_ACUITY_SCORE: 4
NUMBER_OF_TIMES_PATIENT_HAS_FALLEN_WITHIN_LAST_SIX_MONTHS: 6
DOING_ERRANDS_INDEPENDENTLY_DIFFICULTY: NO
ADLS_ACUITY_SCORE: 10
WHICH_OF_THE_ABOVE_FUNCTIONAL_RISKS_HAD_A_RECENT_ONSET_OR_CHANGE?: FALL HISTORY
ADLS_ACUITY_SCORE: 7
ADLS_ACUITY_SCORE: 7
WALKING_OR_CLIMBING_STAIRS_DIFFICULTY: NO
ADLS_ACUITY_SCORE: 7
TOILETING_ISSUES: NO
CONCENTRATING,_REMEMBERING_OR_MAKING_DECISIONS_DIFFICULTY: NO
ADLS_ACUITY_SCORE: 7
FALL_HISTORY_WITHIN_LAST_SIX_MONTHS: YES
ADLS_ACUITY_SCORE: 7

## 2021-12-11 ASSESSMENT — ENCOUNTER SYMPTOMS: SHORTNESS OF BREATH: 0

## 2021-12-11 ASSESSMENT — MIFFLIN-ST. JEOR: SCORE: 1535.61

## 2021-12-11 NOTE — TELEPHONE ENCOUNTER
Tac level continues to be <3 for 3 consecutive checks. Pt stopped vori at the end of Nov. Pt was taking 2 mg bid (took 3 mg on 12/9). Cr 1.6. Per Dr. Montgomery and Dr. Conte pt to present to ER for a tac gtt. Pt directed to take morning dose prior to going in. Pt 7 months post heart transplant. Continues to be on  mg bid.  ER notified. Pt verbalized understanding of next steps.

## 2021-12-11 NOTE — H&P
Cardiology- Heart Failure Service  Cards 2 Team     History and Physical    Date of Admission:  12/11/2021  Date of Service (when I saw the patient): 12/11/21    Assessment & Plan   Mr. Jim Willingham is a 66yr old male with a history of NICM (s/p HM2 LVAD 6/2017), AFib, CKD, DMII, COPD, now s/p OHT 5/6/21 which was complicated by KALI cavitary lesion treated for aspergillus pneumonia treated with 2 months of voriconazole, COVID pneumonia  who is admitted after having persistently undetectable tacrolimus levels since 12/3 which is secondary to coming off the voriconazole.      Subtherapeutic tacrolimus level  -started on tacro drip based on total daily po dose of 6mg --> currently on 83mcg hour  -will check tacro level with AM labs and decide on po tacro dosing. Previous goal 6-8 as of SOT note 11/12/21.  -He is otherwise doing well with clinically no signs of rejection. Basic labs today overall stable, with improved renal function likely due to the subtherapeutic tacrolimus.     NICM, s/p OHT 5/6/21  His post-transplant course has been c/b right pleural air leak (required prolonged chest tube), pAFib, CAP (RLL, 6/2021), recurrent pleural effusions (s/p thoracenteses x2 6/2021 and 7/2021), RLL cavitary lesions (per chest CT 7/14/21, BD glucan indeterminate, aspergillus negative), pericardial effusion (1.4cm per TTE 7/12/21, conservatively managed), low-grade EBV viremia, COVID pneumonia and KALI cavitary lesion positive on BAL for aspergillus treated for 2 months with voriconazole     Rejection history:  none  AlloMap scores:  n/a  DSAs:  none  Coronary angio/Ischemic eval:  Deferred due to renal dysfunction  Last RHC:  10/5/21, RA 4, 29/14/19, PCWP 12, and Kee CO/CI 6.6/3.4, TD CO/CI 5.5/2.8  Echo/cMRI:  TTE 8/29/21 showed stable graft function, with LVEF 60-65% and normal RV size/function, and trivial loculated pericardial effusion.     Serostatus:  - CMV D+/R+  - EBV D+/R+  - Toxo  D-/R-     Immunosuppression:  - undetectable tacro dose, suspected inadequate dosing after coming off of the voriconazole, now on tacro drip, will check tacro level tomorrow and adjust accordingly  -continue cellcept 500mg bid    PPx:  - CAV:  Aspirin 81mg daily.  Statin has been held due to elevated LFTs, defer restarting now.  - GI:  Pantoprazole 40mg daily  - Osteoporosis:  Calcium/vitamin D supplements  - CMV:  Completed 3m Valcyte therapy   - PCP:  Received pentamidine 8/11, due q28 days  - Thrush:    - Toxo:  n/a     Graft function:  - BPs:  Controlled  - HRs:  Tachycardic, 100s, normal post transplant   - fluid status:  Euvolemic, not on diuretics      Stabe 3b CKD  Creatinine has been as high as 2.5, in the setting of tacrolimus, some episodes of hypovolemia.  Improved today to 1.2.   - continue to trend BMP daily    EBV viremia  EBV quant 4800 11/4, down trended from 8200 10/5/21, no concern to recheck at this time     Gout/pseudogout  -Predated transplant, no active flares  -continue PTA allopurinol      Depression  -stable, continue PTA wellbutrin 75mg BID  COPD  -stable, continue PTA singulair, Trelegy Ellipta, Albuterol      Hx of COVID pneumonia    Hx of RLL pneumonia  Hx of recurrent exudative pleural effusions (6/2021, 7/2021)  Hx of KALI Cavitary Lung Lesion aspergillus BAL+, s/p voriconazole    Hx of Sylvester En Y gastric bypass         FEN:  peripherals  DVT Prophylaxis: Heparin SQ  Code Status: Full Code  Disposition: Expected discharge in 3-5 days once tacrolimus level and dose stable and therapeutic.    Pt seen and discussed with the staff attending Dr. Conte, who agrees with the assessment and plan.    Molly Segal MD  Advanced HF Cardiology Fellow      Late entry - pt seen and examined on 12/11/21  I have reviewed today's vital signs, notes, medications, labs and imaging. I have also seen and examined the patient and agree with the findings and plan as outlined above.    Rose Conte,  MD  Section Head - Advanced Heart Failure, Transplantation and Mechanical Circulatory Support  Director - [facility]  Associate Professor of Medicine, [facility]        Primary Care Physician   Ricardo Gómez    Chief Complaint   Sent in for undetectable tacrolimus level 7 months post heart transplant    History of Present Illness      Mr. Jim Willingham is a 66yr old male with a history of NICM (s/p HM2 LVAD 6/2017), AFib, CKD, DMII, COPD, now s/p OHT 5/6/21 who is admitted after having persistently undetectable tacrolimus levels since 12/3.    His post-transplant course has been c/b right pleural air leak (required prolonged chest tube), pAFib, CAP (RLL, 6/2021), recurrent pleural effusions (s/p thoracenteses x2 6/2021 and 7/2021, exudative, repeatedly cultured negative), RLL cavitary lesions (per chest CT 7/14/21, BD glucan indeterminate, aspergillus negative, not started on antifungals), pericardial effusion (1.4cm per TTE 7/12/21, conservatively managed), low-grade EBV viremia, and recurrent/persistent gout flare (now on Anakinra).  He was recently hospitalized 8/24-9/2 with COVID-19, where he received monoclonal antibodies and remdesivir x5 days.  He was found to have a KALI cavitary lesion with bronchoscopy showing aspergillus on 8/30/21, so was started on voriconazole that admission.      He completed 2 months total of voriconazole at the end of November. Right before stopping voriconazole, he was on 0.5mg bid.  He was increased sequentially to 2mg bid, but despite this had undetectable levels on 3 checks since 12/3.      He was then directed to the ED to be admitted, for tacrolimus drip.  Otherwise for immunosuppression he continues on cellcept 500mg bid. He reports compliance with all his medications.   He reported headaches at the end of November, CTH was ordered and done 12/1 which did not show any acute pathology or changes.     He does report dry eyes  that he thinks is allergies, gets better with occasional Benadryl use.  He has L shoulder rotator cuff injury and so was using tramadol around the clock for that and when he was doing that he got constipated. In the past few weeks has only been using tramadol every other day no more issues with constipation.  He denies fever, chills, nausea, vomiting, other URI symptoms, chest pain, SOB, abdominal swelling or pain, constipation, diarrhea, melena or hematochezia, hematuria or dysuria, numbness, weakness, tingling, LE edema, orthopnea, PND, lightheadedness, dizziness, palpitations, or syncope.     He was able to use the snowblower yesterday to shovel his driveway and clean off his car without any limitations and he was very excited about that as last year this time he wouldn't have been able to do that pre transplant. He feels very well, and has gained some weight recently. No big appetite issues, everything tastes salty.      Past Medical History    I have reviewed this patient's medical history and updated it with pertinent information if needed.   Past Medical History:   Diagnosis Date     Bariatric surgery status 2003     Benign essential hypertension 05/11/2017     Bilateral carpal tunnel syndrome 11/02/2020     Cardiomyopathy, unspecified (H) 05/08/2017     CKD (chronic kidney disease) stage 3, GFR 30-59 ml/min (H) 05/11/2017     COPD (chronic obstructive pulmonary disease) (H) 11/02/2020     Depression 05/11/2017     Diabetes mellitus (H) 1995     Gouty arthropathy, chronic, without tophi 11/02/2020     H/O gastric bypass 05/11/2017     ICD (implantable cardioverter-defibrillator), biventricular, in situ 05/11/2017     LVAD (left ventricular assist device) present (H)      Major depression, recurrent, chronic (H) 11/02/2020     NICM (nonischemic cardiomyopathy) (H)/ EF 20% 05/11/2017    ECHO: LVEDd. 7.66 cm, Restrictive pattern , Severe mitral valve regurgitation     CECILIA (obstructive sleep apnea) 05/11/2017      Paroxysmal atrial fibrillation (H) 05/11/2017     Paroxysmal VT (H) 05/11/2017     Pulmonary cavitary lesion 11/18/2021     Rotator cuff tear arthropathy of both shoulders 11/02/2020     Uncomplicated asthma      Vitamin B12 deficiency (non anemic) 05/11/2017     Past Surgical History   I have reviewed this patient's surgical history and updated it with pertinent information if needed.  Past Surgical History:   Procedure Laterality Date     ANESTHESIA CARDIOVERSION N/A 05/11/2020    Procedure: ANESTHESIA, FOR CARDIOVERSION @1100;  Surgeon: GENERIC ANESTHESIA PROVIDER;  Location: [facility] OR     BRONCHOSCOPY (RIGID OR FLEXIBLE), DIAGNOSTIC N/A 8/30/2021    Procedure: BRONCHOSCOPY, WITH BRONCHOALVEOLAR LAVAGE;  Surgeon: Perlman, David Morris, MD;  Location: [facility] GI     GI SURGERY  2003    Sylvester en Y     INSERT VENTRICULAR ASSIST DEVICE LEFT (HEARTMATE II) N/A 06/19/2017    Procedure: INSERT VENTRICULAR ASSIST DEVICE LEFT (HEARTMATE II);  Median Sternotomy Heartmate II Left Ventricular Assist Device Insertion on Pump Oxygenator;  Surgeon: Ronnie Quigley MD;  Location: [facility] OR     IR CHEST TUBE PLACEMENT NON-TUNNELED RIGHT  7/11/2021     ORTHOPEDIC SURGERY  1994    right knee wired     PICC DOUBLE LUMEN PLACEMENT Right 09/23/2020    5FR PICC DL. Length-43cm (1cm out).     PICC INSERTION - DOUBLE LUMEN Right 05/09/2021    IK/BRACH     RELEASE CARPAL TUNNEL BILATERAL Bilateral 02/18/2021    Procedure: Bilateral carpal tunnel release;  Surgeon: Jermaine Brand MD;  Location: [facility] OR     TRANSPLANT HEART RECIPIENT N/A 05/05/2021    Procedure: Redo median sternotomy, lysis of adhesions, heart transplant recipient, on cardiopulmonary bypass, intraoperative transesophageal echocardiogram per anesthesia, Implantable Cardioverter Defibrillator (ICD) removal;  Surgeon: Ronnie Quigley MD;  Location: [facility] OR       Prior to Admission Medications   Prior to Admission Medications   Prescriptions Last Dose Informant Patient Reported? Taking?    Continuous Blood Gluc Transmit (DEXCOM G6 TRANSMITTER) MISC  Self Yes No   Fluticasone-Umeclidin-Vilanterol (TRELEGY ELLIPTA) 100-62.5-25 MCG/INH oral inhaler   No No   Sig: Inhale 1 puff into the lungs daily   Melatonin 10 MG CAPS  Self Yes No   Sig: Take 1 capsule by mouth At Bedtime   acetaminophen (TYLENOL) 325 MG tablet  Self No No   Sig: Take 3 tablets (975 mg) by mouth every 6 hours as needed for other (For optimal non-opioid multimodal pain management to improve pain control.)   albuterol (VENTOLIN HFA) 108 (90 Base) MCG/ACT inhaler   No No   Sig: INHALE 2 PUFFS BY MOUTH EVERY 4 HOURS AS NEEDED. MAX OF 12 PUFFS PER 24 HOURS   allopurinol (ZYLOPRIM) 300 MG tablet   No No   Sig: Take 1 tablet (300 mg) by mouth daily   anakinra (KINERET) 100 MG/0.67ML SOSY injection  Self No No   Sig: Inject 0.67 mLs (100 mg) Subcutaneous daily For 3 days. Hold for signs of infection, then seek medical attention.   aspirin (ASA) 81 MG chewable tablet  Self No No   Sig: Take 1 tablet (81 mg) by mouth daily   blood glucose (NO BRAND SPECIFIED) test strip  Self No No   Sig: Use to test blood sugar four times daily or as directed.   blood glucose (ONE TOUCH DELICA) lancing device  Self No No   Sig: Lancing device to be used with lancets.   blood glucose monitoring (ONE TOUCH ULTRA 2) meter device kit  Self No No   Sig: Use to test blood sugar four times daily or as directed.   buPROPion (WELLBUTRIN) 75 MG tablet   No No   Sig: Take 1 tablet (75 mg) by mouth 2 times daily   calcium citrate-vitamin D (CITRACAL) 315-250 MG-UNIT TABS per tablet   No No   Sig: Take 1 tablet by mouth 2 times daily   ferrous sulfate (FEROSUL) 325 (65 Fe) MG tablet  Self No No   Sig: Take 1 tablet (325 mg) by mouth daily (with breakfast)   gabapentin (NEURONTIN) 300 MG capsule   Yes No   Sig: Take 1 capsule (300 mg) by mouth 3 times daily   insulin pen needle (32G X 4 MM) 32G X 4 MM miscellaneous  Self No No   Sig: Use four pen needles daily or as  directed.   montelukast (SINGULAIR) 10 MG tablet   No No   Sig: Take 1 tablet (10 mg) by mouth At Bedtime   mycophenolate (GENERIC EQUIVALENT) 250 MG capsule 2021 at Unknown time  No Yes   Sig: Take 2 capsules (500 mg) by mouth 2 times daily   pantoprazole (PROTONIX) 40 MG EC tablet   No No   Sig: TAKE 1 TABLET(40 MG) BY MOUTH TWICE DAILY   tacrolimus (GENERIC EQUIVALENT) 0.5 MG capsule   Yes No   Sig: Take 3 capsules (1.5 mg) by mouth every morning AND 3 capsules (1.5 mg) every evening.   tacrolimus (GENERIC EQUIVALENT) 1 MG capsule 2021 at Unknown time  No Yes   Sig: Take 2 capsules (2 mg) by mouth every morning AND 2 capsules (2 mg) every evening.   traMADol (ULTRAM) 50 MG tablet   No No   Sig: Take 1-2 tablets ( mg) by mouth every 6 hours as needed for moderate pain      Facility-Administered Medications: None     Allergies   Allergies   Allergen Reactions     Grass Shortness Of Breath     Ace Inhibitors Cough     Cats      Dust Mites Other (See Comments)     Asthma     Mold Other (See Comments)     Asthma     Penicillins Other (See Comments)     Unknown - childhood exposure    Tolerated Zosyn -2020    Per patient report he has tolerated amoxicillin     Sulfa Drugs Other (See Comments) and Unknown     Unknown childhood reaction     Social History   Social History     Tobacco Use     Smoking status: Former Smoker     Quit date:      Years since quittin.9     Smokeless tobacco: Never Used   Substance Use Topics     Alcohol use: No     Drug use: No     Family History   I have reviewed this patient's family history and updated it with pertinent information if needed.  Family History   Problem Relation Age of Onset     Cerebrovascular Disease Mother 64     Diabetes Mother      Hypertension Mother      Coronary Artery Disease Father      Diabetes Type 2  Father      Obesity Brother      Obesity Brother      Cerebrovascular Disease Daughter 40     Review of Systems   The 10 point  Review of Systems is negative other than noted in the HPI or here.     Physical Exam   Temp: 98.6  F (37  C) Temp src: Oral BP: 123/78 Pulse: 110   Resp: 16 SpO2: 99 % O2 Device: None (Room air)    Vital Signs with Ranges  Temp:  [98.6  F (37  C)] 98.6  F (37  C)  Pulse:  [110] 110  Resp:  [16] 16  BP: (123)/(78) 123/78  SpO2:  [99 %] 99 %  170 lbs 0 oz    GEN:  Alert, interactive, appears comfortable, NAD. Compared to previously does look like he is feeling very well.   HEENT:  Normocephalic/atraumatic, no scleral icterus, no nasal discharge, OP clear with MMM, no thrush or other oral lesions.  CV:  Regular rhythm, slightly tachycardic no murmur or JVD.  JVP 6cm.   LUNGS:  Clear to auscultation bilaterally, no wheezes or crackles.   ABD:  Active bowel sounds, soft, non-tender/non-distended.  No rebound/guarding/rigidity.  EXT:  No edema or cyanosis.  Hands/feet warm to touch with good signs of peripheral perfusion.    SKIN:  Dry to touch, no exanthems noted in the visualized areas.  NEURO:  Alert and oriented, no new focal deficits appreciated.  PSCYH: Normal mood and affect    Data   Data reviewed today:  I personally reviewed TTE, RHC as detailed in assessment and plan, as well as labs.     Recent Labs   Lab 12/08/21  0911 12/08/21  0910   WBC 5.3  --    HGB 11.2*  --    MCV 96  --      --    NA  --  139   POTASSIUM  --  4.1   CHLORIDE  --  110*   CO2  --  24   BUN  --  32*   CR  --  1.60*   ANIONGAP  --  5   QAMAR  --  8.6   GLC  --  107*       No results found for this or any previous visit (from the past 24 hour(s)).

## 2021-12-11 NOTE — ED PROVIDER NOTES
[place] EMERGENCY DEPARTMENT ([facility])  12/11/21  History     Chief Complaint   Patient presents with     Abnormal Labs     The history is provided by the patient and medical records.     Jim Willingham is a 66 year old male with a past medical history significant for s/p orthotopic heart transplant (5/5/2021, immunosuppressed), stage 3b CKD, type 2 diabetes mellitus, COPD, and RUL pulmonary cavity lesion who presents to the Emergency Department for evaluation of abnormal labs. He has been subtherapeutic on his tacrolimus levels (less than 3 for 3 consecutive checks). Denies sxs such as shortness of breath, chest pain. Was able to clear snow off his driveway for the first time with new heart yesterday. Cardiology wanted patient initiated on Tacrolimus drip and so presents to the Emergency Department.       Past Medical History  Past Medical History:   Diagnosis Date     Bariatric surgery status 2003     Benign essential hypertension 05/11/2017     Bilateral carpal tunnel syndrome 11/02/2020     Cardiomyopathy, unspecified (H) 05/08/2017     CKD (chronic kidney disease) stage 3, GFR 30-59 ml/min (H) 05/11/2017     COPD (chronic obstructive pulmonary disease) (H) 11/02/2020     Depression 05/11/2017     Diabetes mellitus (H) 1995     Gouty arthropathy, chronic, without tophi 11/02/2020     H/O gastric bypass 05/11/2017     ICD (implantable cardioverter-defibrillator), biventricular, in situ 05/11/2017     LVAD (left ventricular assist device) present (H)      Major depression, recurrent, chronic (H) 11/02/2020     NICM (nonischemic cardiomyopathy) (H)/ EF 20% 05/11/2017    ECHO: LVEDd. 7.66 cm, Restrictive pattern , Severe mitral valve regurgitation     CECILIA (obstructive sleep apnea) 05/11/2017     Paroxysmal atrial fibrillation (H) 05/11/2017     Paroxysmal VT (H) 05/11/2017     Pulmonary cavitary lesion 11/18/2021     Rotator cuff tear arthropathy of both shoulders 11/02/2020     Uncomplicated asthma       Vitamin B12 deficiency (non anemic) 05/11/2017     Past Surgical History:   Procedure Laterality Date     ANESTHESIA CARDIOVERSION N/A 05/11/2020    Procedure: ANESTHESIA, FOR CARDIOVERSION @1100;  Surgeon: GENERIC ANESTHESIA PROVIDER;  Location: [facility] OR     BRONCHOSCOPY (RIGID OR FLEXIBLE), DIAGNOSTIC N/A 8/30/2021    Procedure: BRONCHOSCOPY, WITH BRONCHOALVEOLAR LAVAGE;  Surgeon: Perlman, David Morris, MD;  Location:  GI     CV HEART BIOPSY N/A 5/13/2021    Procedure: Heart Biopsy;  Surgeon: Scout Robins MD;  [facility]: [facility] HE[facility] CARDIAC CATH LAB     CV HEART BIOPSY N/A 5/20/2021    Procedure: Heart Biopsy;  Surgeon: Jeffrey Gibson MD;  Location:  HEART CARDIAC CATH LAB     CV HEART BIOPSY N/A 5/27/2021    Procedure: Heart Biopsy;  Surgeon: Jac Dover MD;  Location: [facility]     CV HEART BIOPSY N/A 6/7/2021    Procedure: CV HEART BIOPSY;  Surgeon: Jeffrey Gibson MD;  Location:  HEART CARDIAC CATH LAB     CV HEART BIOPSY N/A 6/21/2021    Procedure: CV HEART BIOPSY;  Surgeon: Scout Robins MD;  Location: [facility] C[facility] CATH LAB     CV HEART BIOPSY N/A 7/5/2021    Procedure: CV HEART BIOPSY;  Surgeon: Jeffrey Gibson MD;  Location: [facility] CARD[facility] CATH LAB     CV HEART BIOPSY N/A 7/16/2021    Procedure: Heart Biopsy;  Surgeon: Amadeo Art MD;  Location: [facility] C[facility]     CV HEART BIOPSY N/A 8/5/2021    Procedure: Heart Biopsy;  Surgeon: Jeffrey Gibson MD;  Location:  HEART CARDIAC CATH LAB     CV HEART BIOPSY N/A 8/31/2021    Procedure: Heart Cath Heart Biopsy;  Surgeon: Jeffrey Gibson MD;  Location:  HEART CARDIAC CATH LAB     CV HEART BIOPSY N/A 9/21/2021    Procedure: CV HEART BIOPSY;  Surgeon: Jeffrey Gibson MD;  Location:  HEART CARDIAC CATH LAB     CV HEART BIOPSY N/A 10/5/2021    Procedure: CV HEART BIOPSY;  Surgeon: Jeffrey Gibson  MD Sue;  Location:  HEART CARDIAC CATH LAB     CV HEART BIOPSY N/A 11/4/2021    Procedure: CV HEART BIOPSY;  Surgeon: Nicola Seth MD;  Location:  HEART CARDIAC CATH LAB     CV RIGHT HEART CATH MEASUREMENTS RECORDED N/A 07/24/2019    Procedure: CV RIGHT HEART CATH;  Surgeon: Renu Sears MD;  Location:  HEART CARDIAC CATH LAB     CV RIGHT HEART CATH MEASUREMENTS RECORDED N/A 08/05/2020    Procedure: CV RIGHT HEART CATH;  Surgeon: Nicola Seth MD;  Location:  HEART CARDIAC CATH LAB     CV RIGHT HEART CATH MEASUREMENTS RECORDED N/A 01/07/2021    Procedure: CV RIGHT HEART CATH;  Surgeon: Jac Dover MD;  Location:  HEART CARDIAC CATH LAB     CV RIGHT HEART CATH MEASUREMENTS RECORDED N/A 02/23/2021    Procedure: Heart Cath Right Heart Cath;  Surgeon: Jeffrey Gibson MD;  Location:  HEART CARDIAC CATH LAB     CV RIGHT HEART CATH MEASUREMENTS RECORDED N/A 03/23/2021    Procedure: Heart Cath Right Heart Cath. request for 3/23;  Surgeon: Jeffrey Gibson MD;  Location:  HEART CARDIAC CATH LAB     CV RIGHT HEART CATH MEASUREMENTS RECORDED N/A 5/13/2021    Procedure: Right Heart Cath;  Surgeon: Scout Robins MD;  Location:  HEART CARDIAC CATH LAB     CV RIGHT HEART CATH MEASUREMENTS RECORDED N/A 5/20/2021    Procedure: Right Heart Cath;  Surgeon: Jeffrey Gibson MD;  Location:  HEART CARDIAC CATH LAB     CV RIGHT HEART CATH MEASUREMENTS RECORDED N/A 5/27/2021    Procedure: Right Heart Cath;  Surgeon: Jac Dover MD;  Location:  HEART CARDIAC CATH LAB     CV RIGHT HEART CATH MEASUREMENTS RECORDED N/A 6/7/2021    Procedure: CV RIGHT HEART CATH;  Surgeon: Jeffrey Gibson MD;  Location:  HEART CARDIAC CATH LAB     CV RIGHT HEART CATH MEASUREMENTS RECORDED N/A 6/21/2021    Procedure: CV RIGHT HEART CATH;  Surgeon: Scout Robins MD;  Location:  HEART CARDIAC CATH LAB     CV RIGHT HEART CATH  MEASUREMENTS RECORDED N/A 7/5/2021    Procedure: CV RIGHT HEART CATH;  Surgeon: Jeffrey Gibson MD;  Location:  HEART CARDIAC CATH LAB     CV RIGHT HEART CATH MEASUREMENTS RECORDED N/A 7/16/2021    Procedure: Right Heart Cath;  Surgeon: Amadeo Art MD;  Location:  HEART CARDIAC CATH LAB     CV RIGHT HEART CATH MEASUREMENTS RECORDED N/A 8/5/2021    Procedure: CV RIGHT HEART CATH;  Surgeon: Jeffrey Gibson MD;  Location:  HEART CARDIAC CATH LAB     CV RIGHT HEART CATH MEASUREMENTS RECORDED N/A 8/31/2021    Procedure: Heart Cath Right Heart Cath;  Surgeon: Jeffrey Gibson MD;  Location:  HEART CARDIAC CATH LAB     CV RIGHT HEART CATH MEASUREMENTS RECORDED N/A 9/21/2021    Procedure: CV RIGHT HEART CATH;  Surgeon: Jeffrey Gibson MD;  Location:  HEART CARDIAC CATH LAB     CV RIGHT HEART CATH MEASUREMENTS RECORDED N/A 10/5/2021    Procedure: CV RIGHT HEART CATH;  Surgeon: Jeffrey Gibson MD;  Location:  HEART CARDIAC CATH LAB     CV RIGHT HEART CATH MEASUREMENTS RECORDED N/A 11/4/2021    Procedure: CV RIGHT HEART CATH;  Surgeon: Nicola Seth MD;  Location:  HEART CARDIAC CATH LAB     GI SURGERY  2003    [facility]     INSERT VENTRICULAR ASSIST DEVICE LEFT (HEARTMATE II) N/A 06/19/2017    Procedure: INSERT VENTRICULAR ASSIST DEVICE LEFT (HEARTMATE II);  Median Sternotomy Heartmate II Left Ventricular Assist Device Insertion on Pump Oxygenator;  Surgeon: Ronnie Quigley MD;  Location:  OR     IR CHEST TUBE PLACEMENT NON-TUNNELED RIGHT  7/11/2021     ORTHOPEDIC SURGERY  1994    right knee wired     PICC DOUBLE LUMEN PLACEMENT Right 09/23/2020    5FR PICC DL. Length-43cm (1cm out).     PICC INSERTION - DOUBLE LUMEN Right 05/09/2021    IK/BRACH     RELEASE CARPAL TUNNEL BILATERAL Bilateral 02/18/2021    Procedure: Bilateral carpal tunnel release;  Surgeon: Jermaine Brand MD;  Location: [facility] OR     TRANSPLANT HEART RECIPIENT N/A  05/05/2021    Procedure: Redo median sternotomy, lysis of adhesions, heart transplant recipient, on cardiopulmonary bypass, intraoperative transesophageal echocardiogram per anesthesia, Implantable Cardioverter Defibrillator (ICD) removal;  Surgeon: Ronnie Quigley MD;  Location:  OR     mycophenolate (GENERIC EQUIVALENT) 250 MG capsule  tacrolimus (GENERIC EQUIVALENT) 1 MG capsule  acetaminophen (TYLENOL) 325 MG tablet  albuterol (VENTOLIN HFA) 108 (90 Base) MCG/ACT inhaler  allopurinol (ZYLOPRIM) 300 MG tablet  anakinra (KINERET) 100 MG/0.67ML SOSY injection  aspirin (ASA) 81 MG chewable tablet  blood glucose (NO BRAND SPECIFIED) test strip  blood glucose (ONE TOUCH DELICA) lancing device  blood glucose monitoring (ONE TOUCH ULTRA 2) meter device kit  buPROPion (WELLBUTRIN) 75 MG tablet  calcium citrate-vitamin D (CITRACAL) 315-250 MG-UNIT TABS per tablet  Continuous Blood Gluc Transmit (DEXCOM G6 TRANSMITTER) MISC  ferrous sulfate (FEROSUL) 325 (65 Fe) MG tablet  Fluticasone-Umeclidin-Vilanterol (TRELEGY ELLIPTA) 100-62.5-25 MCG/INH oral inhaler  gabapentin (NEURONTIN) 300 MG capsule  insulin pen needle (32G X 4 MM) 32G X 4 MM miscellaneous  Melatonin 10 MG CAPS  montelukast (SINGULAIR) 10 MG tablet  pantoprazole (PROTONIX) 40 MG EC tablet  tacrolimus (GENERIC EQUIVALENT) 0.5 MG capsule  traMADol (ULTRAM) 50 MG tablet      Allergies   Allergen Reactions     Grass Shortness Of Breath     Ace Inhibitors Cough     Cats      Dust Mites Other (See Comments)     Asthma     Mold Other (See Comments)     Asthma     Penicillins Other (See Comments)     Unknown - childhood exposure    Tolerated Zosyn 9/18-9/20/2020    Per patient report he has tolerated amoxicillin     Sulfa Drugs Other (See Comments) and Unknown     Unknown childhood reaction     Family History  Family History   Problem Relation Age of Onset     Cerebrovascular Disease Mother 64     Diabetes Mother      Hypertension Mother      Coronary Artery Disease  "Father      Diabetes Type 2  Father      Obesity Brother      Obesity Brother      Cerebrovascular Disease Daughter 40     Social History   Social History     Tobacco Use     Smoking status: Former Smoker     Quit date:      Years since quittin.9     Smokeless tobacco: Never Used   Substance Use Topics     Alcohol use: No     Drug use: No      Past medical history, past surgical history, medications, allergies, family history, and social history were reviewed with the patient. No additional pertinent items.     I have reviewed the Medications, Allergies, Past Medical and Surgical History, and Social History in the Epic system.    Review of Systems   Respiratory: Negative for shortness of breath.    Cardiovascular: Negative for chest pain.   All other systems reviewed and are negative.    A complete review of systems was performed with pertinent positives and negatives noted in the HPI, and all other systems negative.    Physical Exam   BP: 123/78  Pulse: 110  Temp: 98.6  F (37  C)  Resp: 16  Height: 172.7 cm (5' 8\")  Weight: 77.1 kg (170 lb)  SpO2: 99 %      Physical Exam  Vitals and nursing note reviewed.   Constitutional:       General: He is not in acute distress.     Appearance: Normal appearance. He is well-developed and normal weight. He is not ill-appearing or diaphoretic.   HENT:      Head: Normocephalic and atraumatic.      Nose: Nose normal.      Mouth/Throat:      Mouth: Mucous membranes are moist.   Eyes:      General: No scleral icterus.     Conjunctiva/sclera: Conjunctivae normal.   Cardiovascular:      Rate and Rhythm: Normal rate.   Pulmonary:      Effort: Pulmonary effort is normal. No respiratory distress.      Breath sounds: No stridor.   Abdominal:      General: There is no distension.   Musculoskeletal:         General: No deformity or signs of injury. Normal range of motion.      Cervical back: Normal range of motion and neck supple. No rigidity.   Skin:     General: Skin is warm and " dry.      Coloration: Skin is not jaundiced or pale.      Findings: No rash.   Neurological:      General: No focal deficit present.      Mental Status: He is alert and oriented to person, place, and time.   Psychiatric:         Mood and Affect: Mood normal.         Behavior: Behavior normal.         Thought Content: Thought content normal.         ED Course          Procedures                   No results found. However, due to the size of the patient record, not all encounters were searched. Please check Results Review for a complete set of results.  Medications   tacrolimus (PROGRAF) 20 mcg/mL in D5W 250 mL (has no administration in time range)             Assessments & Plan (with Medical Decision Making)   Jim Willingham is a 66 year old male with a past medical history significant for s/p orthotopic heart transplant (5/5/2021, immunosuppressed), stage 3b CKD, type 2 diabetes mellitus, COPD, and RUL pulmonary cavity lesion who presents to the Emergency Department for evaluation of abnormal labs.    Ddx: tacrolimus subtherapeutic, heart transplant, med monitoring    Discussed with Dr. Rose Conte. Patient sent in for Tacro level and to be started on a tacro drip. Will need admission to [facility] 2. They will place tacro orders. Also requesting basic labs. These are ordered. Patient still in waiting room. Asymptomatic. Feeling well. Updated on plan for admission and bring back to initiate labs and tacro infusion once bed/staff available. Bed request placed for Cards 2 admission.     Patient signed out to Dr. Gonzalez at shift change.     I have reviewed the nursing notes.    I have reviewed the findings, diagnosis, plan and need for follow up with the patient.    New Prescriptions    No medications on file       Final diagnoses:   Encounter for monitoring tacrolimus therapy       12/11/2021   [facility], located within [facility] EMERGENCY DEPARTMENT     Apryl Presley MD  12/11/21 [id number]

## 2021-12-11 NOTE — ED TRIAGE NOTES
Pt presents because Tac level continues to be <3 for 3 consecutive checks. Pt 7 months post heart transplant.

## 2021-12-12 LAB
ALBUMIN SERPL-MCNC: 2.8 G/DL (ref 3.4–5)
ALP SERPL-CCNC: 111 U/L (ref 40–150)
ALT SERPL W P-5'-P-CCNC: 17 U/L (ref 0–70)
ANION GAP SERPL CALCULATED.3IONS-SCNC: 5 MMOL/L (ref 3–14)
AST SERPL W P-5'-P-CCNC: 13 U/L (ref 0–45)
BASOPHILS # BLD AUTO: 0.1 10E3/UL (ref 0–0.2)
BASOPHILS NFR BLD AUTO: 2 %
BILIRUB SERPL-MCNC: 0.5 MG/DL (ref 0.2–1.3)
BUN SERPL-MCNC: 22 MG/DL (ref 7–30)
CALCIUM SERPL-MCNC: 8.6 MG/DL (ref 8.5–10.1)
CHLORIDE BLD-SCNC: 112 MMOL/L (ref 94–109)
CO2 SERPL-SCNC: 21 MMOL/L (ref 20–32)
CREAT SERPL-MCNC: 1.15 MG/DL (ref 0.66–1.25)
EOSINOPHIL # BLD AUTO: 0.3 10E3/UL (ref 0–0.7)
EOSINOPHIL NFR BLD AUTO: 5 %
ERYTHROCYTE [DISTWIDTH] IN BLOOD BY AUTOMATED COUNT: 13.3 % (ref 10–15)
GFR SERPL CREATININE-BSD FRML MDRD: 67 ML/MIN/1.73M2
GLUCOSE BLD-MCNC: 94 MG/DL (ref 70–99)
HCT VFR BLD AUTO: 31.6 % (ref 40–53)
HGB BLD-MCNC: 10.2 G/DL (ref 13.3–17.7)
IMM GRANULOCYTES # BLD: 0 10E3/UL
IMM GRANULOCYTES NFR BLD: 0 %
LYMPHOCYTES # BLD AUTO: 1.7 10E3/UL (ref 0.8–5.3)
LYMPHOCYTES NFR BLD AUTO: 31 %
MAGNESIUM SERPL-MCNC: 2 MG/DL (ref 1.6–2.3)
MCH RBC QN AUTO: 30.5 PG (ref 26.5–33)
MCHC RBC AUTO-ENTMCNC: 32.3 G/DL (ref 31.5–36.5)
MCV RBC AUTO: 95 FL (ref 78–100)
MONOCYTES # BLD AUTO: 0.5 10E3/UL (ref 0–1.3)
MONOCYTES NFR BLD AUTO: 10 %
NEUTROPHILS # BLD AUTO: 2.8 10E3/UL (ref 1.6–8.3)
NEUTROPHILS NFR BLD AUTO: 52 %
NRBC # BLD AUTO: 0 10E3/UL
NRBC BLD AUTO-RTO: 0 /100
PLATELET # BLD AUTO: 226 10E3/UL (ref 150–450)
POTASSIUM BLD-SCNC: 4.4 MMOL/L (ref 3.4–5.3)
PROT SERPL-MCNC: 5.9 G/DL (ref 6.8–8.8)
RBC # BLD AUTO: 3.34 10E6/UL (ref 4.4–5.9)
SODIUM SERPL-SCNC: 138 MMOL/L (ref 133–144)
TACROLIMUS BLD-MCNC: 4.2 UG/L (ref 5–15)
TACROLIMUS BLD-MCNC: 7.2 UG/L (ref 5–15)
TME LAST DOSE: ABNORMAL H
TME LAST DOSE: ABNORMAL H
TME LAST DOSE: NORMAL H
TME LAST DOSE: NORMAL H
WBC # BLD AUTO: 5.4 10E3/UL (ref 4–11)

## 2021-12-12 PROCEDURE — 85025 COMPLETE CBC W/AUTO DIFF WBC: CPT

## 2021-12-12 PROCEDURE — 250N000011 HC RX IP 250 OP 636: Performed by: NURSE PRACTITIONER

## 2021-12-12 PROCEDURE — 250N000011 HC RX IP 250 OP 636

## 2021-12-12 PROCEDURE — 250N000012 HC RX MED GY IP 250 OP 636 PS 637: Performed by: INTERNAL MEDICINE

## 2021-12-12 PROCEDURE — 83735 ASSAY OF MAGNESIUM: CPT

## 2021-12-12 PROCEDURE — 120N000003 HC R&B IMCU UMMC

## 2021-12-12 PROCEDURE — 36415 COLL VENOUS BLD VENIPUNCTURE: CPT

## 2021-12-12 PROCEDURE — 250N000013 HC RX MED GY IP 250 OP 250 PS 637: Performed by: STUDENT IN AN ORGANIZED HEALTH CARE EDUCATION/TRAINING PROGRAM

## 2021-12-12 PROCEDURE — 80053 COMPREHEN METABOLIC PANEL: CPT

## 2021-12-12 PROCEDURE — 258N000003 HC RX IP 258 OP 636: Performed by: NURSE PRACTITIONER

## 2021-12-12 PROCEDURE — 82040 ASSAY OF SERUM ALBUMIN: CPT

## 2021-12-12 PROCEDURE — 250N000012 HC RX MED GY IP 250 OP 636 PS 637: Performed by: NURSE PRACTITIONER

## 2021-12-12 PROCEDURE — 80197 ASSAY OF TACROLIMUS: CPT

## 2021-12-12 PROCEDURE — 99232 SBSQ HOSP IP/OBS MODERATE 35: CPT | Performed by: NURSE PRACTITIONER

## 2021-12-12 PROCEDURE — 999N000128 HC STATISTIC PERIPHERAL IV START W/O US GUIDANCE

## 2021-12-12 PROCEDURE — 250N000013 HC RX MED GY IP 250 OP 250 PS 637: Performed by: INTERNAL MEDICINE

## 2021-12-12 PROCEDURE — 250N000013 HC RX MED GY IP 250 OP 250 PS 637

## 2021-12-12 RX ORDER — TACROLIMUS 1 MG/1
3 CAPSULE ORAL
Status: DISCONTINUED | OUTPATIENT
Start: 2021-12-12 | End: 2021-12-13 | Stop reason: HOSPADM

## 2021-12-12 RX ADMIN — UMECLIDINIUM 1 PUFF: 62.5 AEROSOL, POWDER ORAL at 10:23

## 2021-12-12 RX ADMIN — TRAMADOL HYDROCHLORIDE 100 MG: 50 TABLET ORAL at 21:30

## 2021-12-12 RX ADMIN — TACROLIMUS 3 MG: 1 CAPSULE ORAL at 18:03

## 2021-12-12 RX ADMIN — PANTOPRAZOLE SODIUM 40 MG: 40 TABLET, DELAYED RELEASE ORAL at 08:53

## 2021-12-12 RX ADMIN — Medication 10 MG: at 21:30

## 2021-12-12 RX ADMIN — DEXTROSE MONOHYDRATE 83 MCG/HR: 5 INJECTION, SOLUTION INTRAVENOUS at 15:56

## 2021-12-12 RX ADMIN — HEPARIN SODIUM 5000 UNITS: 10000 INJECTION, SOLUTION INTRAVENOUS; SUBCUTANEOUS at 19:41

## 2021-12-12 RX ADMIN — ASPIRIN 81 MG CHEWABLE TABLET 81 MG: 81 TABLET CHEWABLE at 08:53

## 2021-12-12 RX ADMIN — FERROUS SULFATE TAB 325 MG (65 MG ELEMENTAL FE) 325 MG: 325 (65 FE) TAB at 13:05

## 2021-12-12 RX ADMIN — FLUTICASONE FUROATE AND VILANTEROL TRIFENATATE 1 PUFF: 100; 25 POWDER RESPIRATORY (INHALATION) at 10:23

## 2021-12-12 RX ADMIN — ALLOPURINOL 300 MG: 300 TABLET ORAL at 08:53

## 2021-12-12 RX ADMIN — Medication 1 TABLET: at 08:53

## 2021-12-12 RX ADMIN — ACETAMINOPHEN 975 MG: 325 TABLET, FILM COATED ORAL at 17:33

## 2021-12-12 RX ADMIN — Medication 1 TABLET: at 19:41

## 2021-12-12 RX ADMIN — BUPROPION HYDROCHLORIDE 75 MG: 75 TABLET, FILM COATED ORAL at 19:41

## 2021-12-12 RX ADMIN — MONTELUKAST 10 MG: 10 TABLET, FILM COATED ORAL at 21:30

## 2021-12-12 RX ADMIN — HEPARIN SODIUM 5000 UNITS: 10000 INJECTION, SOLUTION INTRAVENOUS; SUBCUTANEOUS at 08:53

## 2021-12-12 RX ADMIN — BUPROPION HYDROCHLORIDE 75 MG: 75 TABLET, FILM COATED ORAL at 08:53

## 2021-12-12 RX ADMIN — MYCOPHENOLATE MOFETIL 500 MG: 250 CAPSULE ORAL at 08:53

## 2021-12-12 RX ADMIN — MYCOPHENOLATE MOFETIL 500 MG: 250 CAPSULE ORAL at 18:03

## 2021-12-12 RX ADMIN — ACETAMINOPHEN 975 MG: 325 TABLET, FILM COATED ORAL at 03:11

## 2021-12-12 ASSESSMENT — ACTIVITIES OF DAILY LIVING (ADL)
ADLS_ACUITY_SCORE: 10

## 2021-12-12 ASSESSMENT — MIFFLIN-ST. JEOR: SCORE: 1599.12

## 2021-12-12 NOTE — PROGRESS NOTES
Admitted from: ED to [address] [place] [address] at 1940 PM.  Reason for admission/transfer: For monitoring tacrolimus therapy  2 RN skin assessment: completed by Shaniqua DOLL RN and Steph DICKERSON RN  Result of skin assessment and interventions/actions: Surgical Incision Sites in midline chest and scattered scabs in abdominal area.  Height, weight, drug calc weight: Done  Patient belongings (see Flowsheet): With Patient  MDRO education added to care plan: Yes  ?

## 2021-12-12 NOTE — PROGRESS NOTES
[facility]   Cardiology II Service / Advanced Heart Failure  Daily Progress Note  Date of Service: 12/12/2021      Patient: Jim Willingham  MRN: [id number]  Admission Date: 12/11/2021  Hospital Day # 1    Assessment and Plan:  Mr. Jim Wililngham is a 66yr old male with a history of NICM (s/p HM2 LVAD 6/2017) s/p OHT 5/6/21, VT, AFib, CKD, DMII, and COPD who was admitted due to persistently subtherapeutic tacrolimus levels after completing voriconazole therapy.      PLAN:  - stop tacro infusion at 1800  - start tacro 3mg twice daily, first dose at 1800  - repeat tacro level tomorrow  - anticipate discharge home tomorrow if tacro level remains therapeutic (~6)      Subtherapeutic tacrolimus levels  Recently completed 2m voriconazole therapy for KALI cavitary lesion.  Tacro levels have since been subtherapeutic.  He was admitted and started on a tacro infusion @ 83mcg/hr (calculated off of total daily po tacro dose of 6mg).  Tacro level this am is in process --> we will determine his po tacro dosing, based off of his tacro level.  Tacro goal level 6-8 -- will review ongoing immunosuppression management with Dr. Montgomery.    NICM, s/p OHT 5/6/21  His post-transplant course has been c/b right pleural air leak (required prolonged chest tube), pAFib, CAP (RLL, 6/2021), recurrent pleural effusions (s/p thoracenteses x2 6/2021 and 7/2021, exudative, repeatedly cultured negative), RLL cavitary lesions (per chest CT 7/14/21, BD glucan indeterminate, aspergillus negative, not started on antifungals), pericardial effusion (1.4cm per TTE 7/12/21, conservatively managed), low-grade EBV viremia, recurrent/persistent gout flare, transaminase elevation with questionable choledocholithiasis (conservatively managed with resolution), COVID-19 (8/2021, s/p monoclonal antibodies and remdesivir x5 days), and 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     Rejection history:  none  AlloMap scores:  <  35 recently  DSAs:  none  Coronary angio/Ischemic eval:  Deferred due to renal dysfunction --->  Note that per Dr. Montgomery, may defer until his baseline as he had a young donor  Last RHC:  11/4/21 showed mildly normal biventricular filling pressures with RA 4, mPA 18, PCW 10, and CI 3.53.  Echo/cMRI:  TTE 11/2021 showed stable graft function, with LVEF 55-60%, normal RV size/function, and trivial pericardial effusion.    Serostatus:  - CMV D+/R+  - EBV D+/R+  - Toxo D-/R-     Immunosuppression:  - MMF 500mg twice daily (dose decreased due to multiple pneumonias)  - tacro, goal level ~6.  Tacro level today 7.2.  We will stop tacrolimus infusion and then start tacrolimus 3mg twice daily, with first dose at 1800 today.     PPx:  - CAV:  Aspirin 81mg daily.  Statin has been held due to recent transaminitis and questionable cholelithiasis, will resume as outpatient.  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  He remains asymptomatic.     Gout/pseudogout  Predated transplant.  Has had recurrent flares following transplant that have required steroid bursts and Anakinra treatment.  He continues to follow with rheumatology.  Continue allopurinol and anakinra.     CKD  Creatinine has typically ranged 1.5-1.9, up to 2.5s, in the setting of tacro and hypovolemia.  This admission, creatinine has improved, likely secondary to subtherapeutic tacro levels.  Will continue to monitor.  Follows with renal.    Gout:  Predated transplant.  Limits mobility, follows with rheum as outpatient.  Continue allopurinol.  PTA anakinra held.  Recurrent exudative pleural effusions (6/2021, 7/2021), resolved.   Recent transaminitis:  " LFTs have improved, holding statin, will resume as outpatient.  DMII:  management per PCP/endo  Depression:  Wellbutrin 75mg BID  COPD:  singulair, Trelegy Ellipta, Albuterol, follows with pulmonary.  COVID-19 infection: s/p monoclonal antibodies and remdesivir (8/2021).      =============================================================    Interval History/ROS:   Mr. Willingham states that he feels quite well.  He has continued improvement in strength and endurance, and states that his balance has improved.  He has not had any recent falls.  His breathing has been good, and he denies shortness of breath.  He has been sleeping well and denies PND and orthopnea.  His appetite has been good, and he is eating regularly without nausea, vomiting, diarrhea, constipation.  His weight per his home scale has ranged approximately 170#, and he denies fluid retention.  He otherwise denies chest pain, palpitations, persistent dizziness, headaches, acute vision changes, fevers, chills, cough, sore throat, and signs bleeding.    Last 24 hr care team notes reviewed.   ROS:  4 point ROS including Respiratory, CV, GI and , other than that noted in the HPI, is negative.     Medications: Reviewed in EPIC.     Physical Exam:   /78 (BP Location: Right arm)   Pulse 102   Temp 98.3  F (36.8  C) (Oral)   Resp 17   Ht 1.727 m (5' 8\")   Wt 83.5 kg (184 lb)   SpO2 98%   BMI 27.98 kg/m    GENERAL: Appears alert and oriented times three.   HEENT: Eye symmetrical and free of discharge bilaterally. Mucous membranes moist and without lesions.  NECK: Supple and without lymphadenopathy. JVD negative when sitting upright.  CV: RRR, S1S2 present without murmur, rub, or gallop.   RESPIRATORY: Respirations regular, even, and unlabored. Lungs CTA throughout.   GI: Soft and non distended with normoactive bowel sounds present in all quadrants. No tenderness, rebound, guarding. No organomegaly.   EXTREMITIES: No peripheral edema. 2+ bilateral pedal " pulses.   NEUROLOGIC: Alert and orientated x 3. CN II-XII grossly intact. No focal deficits.   MUSCULOSKELETAL: No joint swelling or tenderness.   SKIN: No jaundice. No rashes or lesions.     Data:  CMPRecent Labs   Lab 12/12/21  0706 12/11/21  1601 12/08/21  0910    136 139   POTASSIUM 4.4 4.5 4.1   CHLORIDE 112* 110* 110*   CO2 21 21 24   ANIONGAP 5 5 5   GLC 94 94 107*   BUN 22 24 32*   CR 1.15 1.19 1.60*   GFRESTIMATED 67 64 45*   QAMAR 8.6 8.8 8.6   MAG 2.0 2.1  --    PROTTOTAL 5.9* 7.0  --    ALBUMIN 2.8* 3.6  --    BILITOTAL 0.5 0.3  --    ALKPHOS 111 123  --    AST 13 19  --    ALT 17 22  --      CBC  Recent Labs   Lab 12/12/21  0706 12/11/21  1601 12/08/21  0911   WBC 5.4 7.1 5.3   RBC 3.34* 3.70* 3.68*   HGB 10.2* 11.2* 11.2*   HCT 31.6* 35.6* 35.2*   MCV 95 96 96   MCH 30.5 30.3 30.4   MCHC 32.3 31.5 31.8   RDW 13.3 13.5 13.3    264 292       Patient discussed with Dr. Conte.      Shelia Means, DNP, FNP-BC, CHFN  Advanced Heart Failure Nurse Practitioner  [facility]

## 2021-12-12 NOTE — PLAN OF CARE
Major Shift events: Transferred from ED to  67 for Tacrolimus Therapy Monitoring.  Neuro: Neuros unchanged. A&Ox4. Follows commands. PERRLA.  Cardiac: NSR. SBP ranges 120s-130s. HR ranges 90s-100s.   Respiratory: Sating >90% on RA.    GI/: Adequate urine Output via Urinal. No BM this shift.   Diet/appetite: On Regular Diet. Tolerating diet well. With Good appetite.  Activity:  Steady Gait. SBA with transfers and ambulation.  Pain: PRN Tylenol utilized for pain, with relief noted.  Skin: Surgical incision site in midline chest and scattered scabs in abdominal region.  LDA's: R anterior upper forearm IV infusing Tacrolimus at 83 mcg/Hr.     Plan: Continue with POC. Notify primary team with changes.     For vital signs and complete assessments, please see documentation flowsheets.

## 2021-12-13 VITALS
BODY MASS INDEX: 27.25 KG/M2 | SYSTOLIC BLOOD PRESSURE: 110 MMHG | RESPIRATION RATE: 19 BRPM | OXYGEN SATURATION: 98 % | DIASTOLIC BLOOD PRESSURE: 84 MMHG | TEMPERATURE: 98 F | HEIGHT: 68 IN | WEIGHT: 179.8 LBS | HEART RATE: 95 BPM

## 2021-12-13 DIAGNOSIS — Z94.1 TRANSPLANTED HEART (H): Primary | ICD-10-CM

## 2021-12-13 LAB
ANION GAP SERPL CALCULATED.3IONS-SCNC: 7 MMOL/L (ref 3–14)
BUN SERPL-MCNC: 18 MG/DL (ref 7–30)
CALCIUM SERPL-MCNC: 8.4 MG/DL (ref 8.5–10.1)
CHLORIDE BLD-SCNC: 111 MMOL/L (ref 94–109)
CO2 SERPL-SCNC: 20 MMOL/L (ref 20–32)
CREAT SERPL-MCNC: 1.08 MG/DL (ref 0.66–1.25)
ERYTHROCYTE [DISTWIDTH] IN BLOOD BY AUTOMATED COUNT: 13.3 % (ref 10–15)
GFR SERPL CREATININE-BSD FRML MDRD: 72 ML/MIN/1.73M2
GLUCOSE BLD-MCNC: 90 MG/DL (ref 70–99)
HCT VFR BLD AUTO: 30.7 % (ref 40–53)
HGB BLD-MCNC: 9.8 G/DL (ref 13.3–17.7)
MAGNESIUM SERPL-MCNC: 2.1 MG/DL (ref 1.6–2.3)
MCH RBC QN AUTO: 30.2 PG (ref 26.5–33)
MCHC RBC AUTO-ENTMCNC: 31.9 G/DL (ref 31.5–36.5)
MCV RBC AUTO: 95 FL (ref 78–100)
PLATELET # BLD AUTO: 205 10E3/UL (ref 150–450)
POTASSIUM BLD-SCNC: 3.9 MMOL/L (ref 3.4–5.3)
RBC # BLD AUTO: 3.24 10E6/UL (ref 4.4–5.9)
SODIUM SERPL-SCNC: 138 MMOL/L (ref 133–144)
TACROLIMUS BLD-MCNC: 5.1 UG/L (ref 5–15)
TME LAST DOSE: NORMAL H
TME LAST DOSE: NORMAL H
WBC # BLD AUTO: 4.6 10E3/UL (ref 4–11)

## 2021-12-13 PROCEDURE — 0001A HC ADMIN COVID VAC PFIZER, 1ST DOSE: CPT | Performed by: INTERNAL MEDICINE

## 2021-12-13 PROCEDURE — 91300 HC RX IP 250 OP 636: CPT | Performed by: INTERNAL MEDICINE

## 2021-12-13 PROCEDURE — 85027 COMPLETE CBC AUTOMATED: CPT | Performed by: NURSE PRACTITIONER

## 2021-12-13 PROCEDURE — 250N000013 HC RX MED GY IP 250 OP 250 PS 637: Performed by: INTERNAL MEDICINE

## 2021-12-13 PROCEDURE — 250N000011 HC RX IP 250 OP 636

## 2021-12-13 PROCEDURE — 250N000012 HC RX MED GY IP 250 OP 636 PS 637: Performed by: NURSE PRACTITIONER

## 2021-12-13 PROCEDURE — 83735 ASSAY OF MAGNESIUM: CPT

## 2021-12-13 PROCEDURE — 82310 ASSAY OF CALCIUM: CPT | Performed by: NURSE PRACTITIONER

## 2021-12-13 PROCEDURE — 250N000012 HC RX MED GY IP 250 OP 636 PS 637: Performed by: INTERNAL MEDICINE

## 2021-12-13 PROCEDURE — 250N000011 HC RX IP 250 OP 636: Performed by: INTERNAL MEDICINE

## 2021-12-13 PROCEDURE — 250N000013 HC RX MED GY IP 250 OP 250 PS 637

## 2021-12-13 PROCEDURE — 36415 COLL VENOUS BLD VENIPUNCTURE: CPT | Performed by: NURSE PRACTITIONER

## 2021-12-13 PROCEDURE — 80197 ASSAY OF TACROLIMUS: CPT

## 2021-12-13 PROCEDURE — 99239 HOSP IP/OBS DSCHRG MGMT >30: CPT | Performed by: NURSE PRACTITIONER

## 2021-12-13 RX ORDER — DIPHENHYDRAMINE HYDROCHLORIDE 50 MG/ML
50 INJECTION INTRAMUSCULAR; INTRAVENOUS
Status: DISCONTINUED | OUTPATIENT
Start: 2021-12-13 | End: 2021-12-13 | Stop reason: HOSPADM

## 2021-12-13 RX ORDER — DIPHENHYDRAMINE HCL 25 MG
50 CAPSULE ORAL
Status: DISCONTINUED | OUTPATIENT
Start: 2021-12-13 | End: 2021-12-13

## 2021-12-13 RX ORDER — EPINEPHRINE 0.3 MG/.3ML
0.3 INJECTION SUBCUTANEOUS
Status: DISCONTINUED | OUTPATIENT
Start: 2021-12-13 | End: 2021-12-13 | Stop reason: HOSPADM

## 2021-12-13 RX ORDER — ACETAMINOPHEN 325 MG/1
650 TABLET ORAL EVERY 4 HOURS PRN
Status: DISCONTINUED | OUTPATIENT
Start: 2021-12-13 | End: 2021-12-13 | Stop reason: HOSPADM

## 2021-12-13 RX ORDER — DIPHENHYDRAMINE HYDROCHLORIDE 50 MG/ML
50 INJECTION INTRAMUSCULAR; INTRAVENOUS
Status: DISCONTINUED | OUTPATIENT
Start: 2021-12-13 | End: 2021-12-13

## 2021-12-13 RX ORDER — DIPHENHYDRAMINE HCL 25 MG
50 CAPSULE ORAL
Status: DISCONTINUED | OUTPATIENT
Start: 2021-12-13 | End: 2021-12-13 | Stop reason: HOSPADM

## 2021-12-13 RX ORDER — TACROLIMUS 1 MG/1
CAPSULE ORAL
Qty: 900 CAPSULE | Refills: 11 | Status: ON HOLD | COMMUNITY
Start: 2021-12-13 | End: 2021-12-17

## 2021-12-13 RX ADMIN — TACROLIMUS 3 MG: 1 CAPSULE ORAL at 07:30

## 2021-12-13 RX ADMIN — PANTOPRAZOLE SODIUM 40 MG: 40 TABLET, DELAYED RELEASE ORAL at 07:29

## 2021-12-13 RX ADMIN — RNA INGREDIENT BNT-162B2 0.3 ML: 0.23 INJECTION, SUSPENSION INTRAMUSCULAR at 14:10

## 2021-12-13 RX ADMIN — Medication 1 TABLET: at 07:29

## 2021-12-13 RX ADMIN — ACETAMINOPHEN 975 MG: 325 TABLET, FILM COATED ORAL at 07:28

## 2021-12-13 RX ADMIN — FLUTICASONE FUROATE AND VILANTEROL TRIFENATATE 1 PUFF: 100; 25 POWDER RESPIRATORY (INHALATION) at 07:28

## 2021-12-13 RX ADMIN — TRAMADOL HYDROCHLORIDE 100 MG: 50 TABLET ORAL at 07:29

## 2021-12-13 RX ADMIN — UMECLIDINIUM 1 PUFF: 62.5 AEROSOL, POWDER ORAL at 07:28

## 2021-12-13 RX ADMIN — MYCOPHENOLATE MOFETIL 500 MG: 250 CAPSULE ORAL at 07:29

## 2021-12-13 RX ADMIN — HEPARIN SODIUM 5000 UNITS: 10000 INJECTION, SOLUTION INTRAVENOUS; SUBCUTANEOUS at 07:30

## 2021-12-13 RX ADMIN — BUPROPION HYDROCHLORIDE 75 MG: 75 TABLET, FILM COATED ORAL at 07:28

## 2021-12-13 RX ADMIN — ASPIRIN 81 MG CHEWABLE TABLET 81 MG: 81 TABLET CHEWABLE at 07:29

## 2021-12-13 RX ADMIN — ALLOPURINOL 300 MG: 300 TABLET ORAL at 07:29

## 2021-12-13 RX ADMIN — FERROUS SULFATE TAB 325 MG (65 MG ELEMENTAL FE) 325 MG: 325 (65 FE) TAB at 12:52

## 2021-12-13 ASSESSMENT — ACTIVITIES OF DAILY LIVING (ADL)
ADLS_ACUITY_SCORE: 10
ADLS_ACUITY_SCORE: 10
ADLS_ACUITY_SCORE: 8
ADLS_ACUITY_SCORE: 10
DEPENDENT_IADLS:: INDEPENDENT
ADLS_ACUITY_SCORE: 10
ADLS_ACUITY_SCORE: 10
ADLS_ACUITY_SCORE: 8
ADLS_ACUITY_SCORE: 10
ADLS_ACUITY_SCORE: 8
ADLS_ACUITY_SCORE: 10

## 2021-12-13 ASSESSMENT — MIFFLIN-ST. JEOR: SCORE: 1580.07

## 2021-12-13 NOTE — PLAN OF CARE
Major Shift events: No Major Shift events.  Neuro: Neuros unchanged. A&Ox4. Follows commands. PERRLA.  Cardiac: SR. SBP ranges 120s-130s. HR ranges 90s-100s.   Respiratory: Sating >90% on RA.    GI/: Adequate urine Output via Urinal. No BM this shift.   Diet/appetite: On Regular Diet. Tolerating diet well. With Good appetite.  Activity:  Steady Gait. SBA with transfers and ambulation.  Pain: PRN Tramadol utilized for pain, with relief noted.  Skin: No new skin deficits noted.  LDA's: L anterior lower forearm IV on SL.     Plan: Continue with POC. Notify primary team with changes.     For vital signs and complete assessments, please see documentation flowsheets.

## 2021-12-13 NOTE — DISCHARGE SUMMARY
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 Conte from 12/11/2021.     Briefly, Mr. Jim Willingham is a 66yr old male with a history of NICM (s/p HM2 LVAD 6/2017) s/p OHT 5/6/21, VT, AFib, CKD, DMII, and COPD who was admitted due to persistently subtherapeutic tacrolimus levels after completing voriconazole therapy.    PHYSICAL EXAM:  Blood pressure 110/84, pulse 95, temperature 98  F (36.7  C), temperature source Oral, resp. rate 19, height 1.727 m (5' 8\"), weight 81.6 kg (179 lb 12.8 oz), SpO2 98 %.  GENERAL: Appears alert and oriented times three. Lying in bed, NAD.  HEENT: Eye symmetrical and free of discharge bilaterally. Mucous membranes moist and without lesions.  NECK: Supple and without lymphadenopathy. JVD negative when lying at 45 .   CV: RRR, S1S2 present without murmur, rub, or gallop.   RESPIRATORY: Respirations regular, even, and unlabored. Lungs CTA throughout.   GI: Soft and non distended with normoactive bowel sounds present in all quadrants. No tenderness, rebound, guarding. No organomegaly.   EXTREMITIES: No LE edema. 2+ bilateral pedal pulses.   NEUROLOGIC: Alert and orientated x 3. CN II-XII grossly intact. No focal deficits.   MUSCULOSKELETAL: No joint swelling or tenderness.   SKIN: No jaundice. No rashes or lesions.     LABS:   Last CBC: "   Recent Labs   Lab Test 12/13/21  0534   WBC 4.6   RBC 3.24*   HGB 9.8*   HCT 30.7*   MCV 95   MCH 30.2   MCHC 31.9   RDW 13.3          Last CMP:  Recent Labs   Lab Test 12/13/21  0534 12/12/21  0706    138   POTASSIUM 3.9 4.4   CHLORIDE 111* 112*   QAMAR 8.4* 8.6   CO2 20 21   BUN 18 22   CR 1.08 1.15   GLC 90 94   AST  --  13   ALT  --  17   BILITOTAL  --  0.5   ALBUMIN  --  2.8*   PROTTOTAL  --  5.9*   ALKPHOS  --  111       IMAGING:  Results for orders placed or performed in visit on 12/01/21   CT Head w/o Contrast    Narrative    CT HEAD W/O CONTRAST 12/1/2021 12:28 PM    History: persistent headaches, history of transplanted heart   ICD-10: Transplanted heart (H)    Comparison: CT 7/3/2021    Technique: Using multidetector thin collimation helical acquisition  technique, axial, coronal and sagittal CT images from the skull base  to the vertex were obtained without intravenous contrast.   (topogram) image(s) also obtained and reviewed.    Findings: There is no intracranial hemorrhage, mass effect, or midline  shift. Gray/white matter differentiation in both cerebral hemispheres  is preserved. Ventricles are proportionate to the cerebral sulci. The  basal cisterns are clear.    The bony calvaria and the bones of the skull base are normal. The  visualized portions of the paranasal sinuses and mastoid air cells are  clear.      Impression    Impression:  No acute intracranial pathology.     I have personally reviewed the examination and initial interpretation  and I agree with the findings.    SHEY SOTOMAYOR MD         SYSTEM ID:  [id number]     *Note: Due to a large number of results and/or encounters for the requested time period, some results have not been displayed. A complete set of results can be found in Results Review.       PROCEDURES:  none    CONSULTATIONS:   none    HOSPITAL COURSE:   Subtherapeutic tacrolimus levels  Recently completed 2m voriconazole therapy for KALI cavitary  lesion.  Tacro levels have since been subtherapeutic.  He was admitted and started on a tacro infusion @ 83mcg/hr (calculated off of total daily po tacro dose of 6mg).  Tacro level 12/12 improved to 7.2, and was 5.1 today.  Advised that he take tacrolimus 3mg in the am and 4mg in the pm.  Will plan for him to repeat a level on Wednesday.  Messaged outpatient Tx coordinator and Dr. Montgomery re: ongoing tacro management/goals.     NICM, s/p OHT 5/6/21  His post-transplant course has been c/b right pleural air leak (required prolonged chest tube), pAFib, CAP (RLL, 6/2021), recurrent pleural effusions (s/p thoracenteses x2 6/2021 and 7/2021, exudative, repeatedly cultured negative), RLL cavitary lesions (per chest CT 7/14/21, BD glucan indeterminate, aspergillus negative, not started on antifungals), pericardial effusion (1.4cm per TTE 7/12/21, conservatively managed), low-grade EBV viremia, recurrent/persistent gout flare, transaminase elevation with questionable choledocholithiasis (conservatively managed with resolution), COVID-19 (8/2021, s/p monoclonal antibodies and remdesivir x5 days), and 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     Rejection history:  none  AlloMap scores:  < 35 recently  DSAs:  none  Coronary angio/Ischemic eval:  Deferred due to renal dysfunction --->  Note that per Dr. Montgomery, may defer until his baseline as he had a young donor  Last RHC:  11/4/21 showed mildly normal biventricular filling pressures with RA 4, mPA 18, PCW 10, and CI 3.53.  Echo/cMRI:  TTE 11/2021 showed stable graft function, with LVEF 55-60%, normal RV size/function, and trivial pericardial effusion.     Serostatus:  - CMV D+/R+  - EBV D+/R+  - Toxo D-/R-     Immunosuppression:  - MMF 500mg twice daily (dose decreased due to multiple pneumonias)  - tacro, goal level ~6.  Advised that he take tacro 3mg/4mg, and repeat level Wednesday.     PPx:  - CAV:  Aspirin 81mg daily.  Statin has been  held due to recent transaminitis and questionable cholelithiasis, will resume as outpatient.  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  He remains asymptomatic.     Gout/pseudogout  Predated transplant.  Has had recurrent flares following transplant that have required steroid bursts and Anakinra treatment.  He continues to follow with rheumatology.  Continue allopurinol and anakinra.     CKD  Creatinine has typically ranged 1.5-1.9, up to 2.5s, in the setting of tacro and hypovolemia.  This admission, creatinine has improved, likely secondary to subtherapeutic tacro levels.  Will continue to monitor.  Follows with renal.     Severe Protein-Calorie Malnutrition, in the context ofacute on chronic illness  % Weight Loss:> 20% in 1 year (severe)  Subcutaneous Fat Loss:Facial region, Upper arm and Lower arm:Moderate  Muscle Loss:Temporal, Facial &jaw region, Scapular bone, Thoracic region (clavicle, acromium bone, deltoid, trapezius, pectoral), Upper arm (bicep, tricep), Lower arm (forearm) and Dorsal hand: Moderate      Gout:  Predated transplant.  Limits mobility, follows with rheum as outpatient.  Continue allopurinol.  PTA anakinra held.  Recurrent exudative pleural effusions (6/2021, 7/2021), resolved.   Recent transaminitis:  LFTs have improved, holding statin, will resume as outpatient.  DMII:  management per PCP/endo  Depression:  Wellbutrin 75mg BID  COPD:  singulair, Trelegy Ellipta, Albuterol, follows with pulmonary.  COVID-19 infection: s/p monoclonal antibodies and remdesivir (8/2021).  He requested covid-vaccination prior to discharge, which was given.    PENDING RESULTS:   none    DISCHARGE  MEDICATIONS:  Current Discharge Medication List      CONTINUE these medications which have CHANGED    Details   tacrolimus (GENERIC EQUIVALENT) 1 MG capsule Take 3 capsules (3 mg) by mouth every morning AND 4 capsules (4 mg) every evening.  Qty: 900 capsule, Refills: 11    Associated Diagnoses: S/P orthotopic heart transplant (H)         CONTINUE these medications which have NOT CHANGED    Details   anakinra (KINERET) 100 MG/0.67ML SOSY injection Inject 0.67 mLs (100 mg) Subcutaneous daily For 3 days. Hold for signs of infection, then seek medical attention.  Qty: 20.1 mL, Refills: 0    Comments: DUPLICATE  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, unspecified laterality; Acute gout due to renal impairment involving knee, unspecified laterality      aspirin (ASA) 81 MG chewable tablet Take 1 tablet (81 mg) by mouth daily  Qty: 100 tablet, Refills: 1    Associated Diagnoses: S/P orthotopic heart transplant (H)      blood glucose (NO BRAND SPECIFIED) test strip Use to test blood sugar four times daily or as directed.  Qty: 200 strip, Refills: 3    Associated Diagnoses: Type 2 diabetes mellitus with diabetic neuropathy, without long-term current use of insulin (H)      blood glucose (ONE TOUCH DELICA) lancing device Lancing device to be used with lancets.  Qty: 1 each, Refills: 0    Associated Diagnoses: Type 2 diabetes mellitus with complication, with long-term current use of insulin (H)      blood glucose monitoring (ONE TOUCH ULTRA 2) meter device kit Use to test blood sugar four times daily or as directed.  Qty: 1 kit, Refills: 0    Associated Diagnoses: Type 2 diabetes mellitus with diabetic neuropathy, without long-term current use of insulin (H)      buPROPion (WELLBUTRIN) 75 MG tablet Take 1 tablet (75 mg) by mouth 2 times daily  Qty: 180 tablet, Refills: 1    Associated Diagnoses: S/P orthotopic heart transplant (H)      calcium citrate-vitamin D (CITRACAL)  315-250 MG-UNIT TABS per tablet Take 1 tablet by mouth 2 times daily  Qty: 180 tablet, Refills: 3    Associated Diagnoses: S/P orthotopic heart transplant (H)      Continuous Blood Gluc Transmit (DEXCOM G6 TRANSMITTER) MISC       ferrous sulfate (FEROSUL) 325 (65 Fe) MG tablet Take 1 tablet (325 mg) by mouth daily (with breakfast)  Qty: 90 tablet, Refills: 3    Associated Diagnoses: Anemia      Fluticasone-Umeclidin-Vilanterol (TRELEGY ELLIPTA) 100-62.5-25 MCG/INH oral inhaler Inhale 1 puff into the lungs daily  Qty: 1 each, Refills: 11    Associated Diagnoses: Chronic obstructive pulmonary disease, unspecified COPD type (H)      insulin pen needle (32G X 4 MM) 32G X 4 MM miscellaneous Use four pen needles daily or as directed.  Qty: 110 each, Refills: 0    Associated Diagnoses: Type 2 diabetes mellitus with diabetic neuropathy, without long-term current use of insulin (H)      Melatonin 10 MG CAPS Take 1 capsule by mouth At Bedtime      montelukast (SINGULAIR) 10 MG tablet Take 1 tablet (10 mg) by mouth At Bedtime  Qty: 90 tablet, Refills: 1    Associated Diagnoses: S/P orthotopic heart transplant (H)      mycophenolate (GENERIC EQUIVALENT) 250 MG capsule Take 2 capsules (500 mg) by mouth 2 times daily  Qty: 120 capsule, Refills: 3    Comments: TXP DT 5/6/2021 (Heart) TXP Dischg DT 5/31/2021 DX Heart replaced by transplant Z94.1 TX [facility] ([place], [place])  Associated Diagnoses: S/P orthotopic heart transplant (H)      pantoprazole (PROTONIX) 40 MG EC tablet TAKE 1 TABLET(40 MG) BY MOUTH TWICE DAILY  Qty: 60 tablet, Refills: 1    Associated Diagnoses: S/P orthotopic heart transplant (H)      traMADol (ULTRAM) 50 MG tablet Take 1-2 tablets ( mg) by mouth every 6 hours as needed for moderate pain  Qty: 60 tablet, Refills: 1    Associated Diagnoses: S/P orthotopic heart transplant (H)      acetaminophen (TYLENOL) 325 MG tablet Take 3 tablets (975 mg) by mouth  every 6 hours as needed for other (For optimal non-opioid multimodal pain management to improve pain control.)  Qty: 100 tablet, Refills: 1    Associated Diagnoses: S/P orthotopic heart transplant (H)      albuterol (VENTOLIN HFA) 108 (90 Base) MCG/ACT inhaler INHALE 2 PUFFS BY MOUTH EVERY 4 HOURS AS NEEDED. MAX OF 12 PUFFS PER 24 HOURS  Qty: 18 g, Refills: 11    Comments: Pharmacy may dispense brand covered by insurance (Proair, or proventil or ventolin or generic albuterol inhaler)  Associated Diagnoses: Chronic obstructive pulmonary disease, unspecified COPD type (H)      allopurinol (ZYLOPRIM) 300 MG tablet Take 1 tablet (300 mg) by mouth daily  Qty: 90 tablet, Refills: 1    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, unspecified laterality         STOP taking these medications       gabapentin (NEURONTIN) 300 MG capsule Comments:   Reason for Stopping:         tacrolimus (GENERIC EQUIVALENT) 0.5 MG capsule Comments:   Reason for Stopping:               DISCHARGE DISPOSITION:   Mr. Jim Willingham will discharge to home in stable condition.     DISCHARGE INSTRUCTIONS:  1.  Take tacro 3mg in the morning and 4mg in the evening  2.  Repeat tacrolimus level on Wednesday      Discharge Procedure Orders   Reason for your hospital stay   Order Comments: You were admitted to the hospital for IV tacrolimus, as your tacro levels were low.     Activity   Order Comments: Your activity upon discharge: activity as tolerated     Order Specific Question Answer Comments   Is discharge order? Yes      Adult [facility] Follow-up and recommended labs and tests   Order Comments: Repeat tacro level on Wednesday  Follow up in transplant clinic, per protocol     Diet   Order Comments: Follow this diet upon discharge: Orders Placed This Encounter      Regular Diet Adult     Order Specific Question Answer Comments   Is discharge order? Yes        45 minutes spent in discharge, including >50% in  counseling and coordination of care, medication review and plan of care recommended on follow up. Questions were answered, and he verbalized understanding of information presented.     It was our pleasure to care for Mr. Willingham during his hospitalization. Please do not hesitate to contact me should there be questions regarding the hospital course or discharge plan.        The above was reviewed with Dr. Conte.    Shelia Means, JOSE, FNP-BC, CHFN  Advanced Heart Failure Nurse Practitioner  [facility]        Pt's condition and duscharge plan discussed with AFSANEH but patient not seen personally by me today.    Rose Conte MD  Section Head - Advanced Heart Failure, Transplantation and Mechanical Circulatory Support  Director - Adult Congenital and Cardiovascular Genetics Center  Associate Professor of Medicine, [facility]

## 2021-12-13 NOTE — DISCHARGE INSTRUCTIONS
1.  Take tacro 3mg in the morning and 4mg in the evening  2.  Repeat tacrolimus level on Wednesday

## 2021-12-13 NOTE — PLAN OF CARE
Vss and afebrile. Continued on tacro drip until 18:00, 3mg tacro given at that time. Denies chest pain. Call light appropriate, voids using urinal, Bmx1 today. Plan to draw tacro level with AM labs and give additional dose as ordered at 8am tomorrow morning. Patient eager to discharge pending levels to see his granddaughter.     Megan Lopez RN CCRN on 12/12/2021 at 6:55 PM

## 2021-12-13 NOTE — CONSULTS
Post Transplant Patient Social Work Assessment     Patient Name: Jim Willingham  : 1955  Age: 66 year old  MRN: [id number]  Date of transplant: Heart Transplant 2021    Patient known to me from follow up in the transplant program.  Admitted on ?21 for Starting a tacrolimus drip. Seen today to update assessment.      Presenting Information   Living Situation: Pt lives with his spouse, Cate, and adopted great granddaughter, Graham  Functional Status: Jim reports that he is actually feeling well, but does have ups and downs. Talks about how he doesn't have to use his walker or cane very much anymore and was able to recently shovel his driveway for the first time in 3-4 years.  Cultural/Language/Spiritual Considerations: None    Support System  Primary Support Person Wife-Cate  Other support:  Adult Children, extended family  Plan for support in immediate post-hospital period: Home with family when medically stable    Health Care Directive  Decision Maker: Patient able to make own decisions  Alternate Decision Maker: Wife  Health Care Directive: Copy in Chart    Mental Health/Coping:   History of Mental Health: Anxiety  History of Chemical Health: History of ETOH and Cannabis, but nothing in recent years  Current status: No concerns  Coping: good coping skills. 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  Impact of transplant on income: Minimal  Insurance and medication coverage: Yes  Financial concerns: None  Resources needed: None    Discharge Plan   Patient and family discharge goal: Home when Tac levels are therapeutic  Barriers to discharge: None    Education provided by SW: Social Work role inpatient setting, availability of support groups    Assessment and recommendations and plan:  Writer familiar from Jim's history of heart  transplant and participation in transplant support group. Jim reports that he was admitted just to start an IV to get his tacrolimus levels back to where they should be and should be discharge home soon. No anticipated discharge needs identified. Jim hopes to join support group this Thursday.

## 2021-12-13 NOTE — PROGRESS NOTES
"CLINICAL NUTRITION SERVICES - ASSESSMENT NOTE     Nutrition Prescription    RECOMMENDATIONS FOR MDs/PROVIDERS TO ORDER:  None at this time     Malnutrition Status:    Severe malnutrition in the context of acute on chronic illness    Recommendations already ordered by Registered Dietitian (RD):  Pt likely discharging today, ordered a Glucerna for pt to be sent via Room Service    Future/Additional Recommendations:  Monitor PO intake      REASON FOR ASSESSMENT  Jim Willingham is a/an 66 year old male assessed by the dietitian for Admission Nutrition Risk Screen for positive- weight loss    NUTRITION HISTORY  Clinical History: NICM (s/p HM2 LVAD 6/2017), AFib, CKD, DMII, COPD, now s/p OHT 5/6/21 which was complicated by KALI cavitary lesion treated for aspergillus pneumonia treated with 2 months of voriconazole, COVID pneumonia  who is admitted after having persistently undetectable tacrolimus levels since 12/3 which is secondary to coming off the voriconazole.      Pt well known to clinical nutrition services with recent heart transplant. Last assessed by RD on 8/31/21. Pt reports that his appetite has improved although he does get full quickly. Drinks Glucerna supplements at home.     CURRENT NUTRITION ORDERS  Diet: Regular  Intake/Tolerance: Pt ate a fruit plate and cottage cheese for lunch.     LABS  Labs reviewed    MEDICATIONS  Medications reviewed  Calcium citrate-vitamin D  Ferosul  Protonix  Tacrolimus    ANTHROPOMETRICS  Height: 172.7 cm (5' 8\")  Most Recent Weight: 81.6 kg (179 lb 12.8 oz)    IBW: 70 kg (117%)   BMI: Overweight BMI 25-29.9  Weight History: Weight loss of 21.7 kg (21%) over the past year. Weight has started to increase over the past 1.5 months.   Wt Readings from Last 25 Encounters:   12/13/21 81.6 kg (179 lb 12.8 oz)   11/18/21 80.4 kg (177 lb 4.8 oz)   11/04/21 79.4 kg (175 lb)   11/04/21 79.4 kg (175 lb)   10/05/21 84.8 kg (187 lb)   10/05/21 84.8 kg (187 lb)   09/27/21 85.9 kg (189 lb 4.8 " oz)   09/21/21 84.4 kg (186 lb)   09/21/21 84.4 kg (186 lb)   09/02/21 84.5 kg (186 lb 4.6 oz)   08/05/21 93.6 kg (206 lb 4.8 oz)   07/18/21 89.4 kg (197 lb 3.2 oz)   07/05/21 88.7 kg (195 lb 9.6 oz)   06/21/21 87.2 kg (192 lb 3.2 oz)   06/07/21 88.8 kg (195 lb 12.8 oz)   05/31/21 94.4 kg (208 lb 3.2 oz)   04/27/21 102.5 kg (226 lb)   04/21/21 103 kg (227 lb)   04/14/21 101.2 kg (223 lb)   04/07/21 99.8 kg (220 lb)   03/30/21 101.6 kg (224 lb)   03/18/21 98 kg (216 lb)   03/11/21 99.3 kg (219 lb)   02/05/21 98.3 kg (216 lb 11.2 oz)   01/26/21 103.3 kg (227 lb 12.8 oz)     Dosing Weight: 82 kg (most recent weight)     ASSESSED NUTRITION NEEDS  Estimated Energy Needs: 2618-6234 kcals/day (25 - 30 kcals/kg)  Justification: Maintenance, high end for repletion  Estimated Protein Needs: 100-125 grams protein/day (1.2 - 1.5 grams of pro/kg)  Justification: Repletion  Estimated Fluid Needs: 1 mL/kcal  Justification: Maintenance    PHYSICAL FINDINGS  See malnutrition section below.    MALNUTRITION  % Intake: Decreased intake does not meet criteria  % Weight Loss: > 20% in 1 year (severe)  Subcutaneous Fat Loss: Facial region, Upper arm and Lower arm: Moderate   Muscle Loss: Temporal, Facial & jaw region, Scapular bone, Thoracic region (clavicle, acromium bone, deltoid, trapezius, pectoral), Upper arm (bicep, tricep), Lower arm  (forearm) and Dorsal hand: Moderate  Fluid Accumulation/Edema: None noted  Malnutrition Diagnosis: Severe malnutrition in the context of acute on chronic illness    NUTRITION DIAGNOSIS  Unintended weight loss related to previous decreased appetite as evidenced by pt report and 21% weight loss over the past 1 year.       INTERVENTIONS  Implementation  Nutrition Education: Discussed current PO intake/appetite, weight trends, and role of RD. Encouraged small frequent meals and continuing to drink nutrition supplements.    Medical food supplement therapy: Sent Carla for pt.      Goals  Patient to  consume % of nutritionally adequate meal trays TID, or the equivalent with supplements/snacks.     Monitoring/Evaluation  Progress toward goals will be monitored and evaluated per protocol.    Tori Shannon RD, LD  6D RD pager [phone]

## 2021-12-13 NOTE — PROGRESS NOTES
Pt is being discharged to home. AVS given and all questions answered. Pt told to make appointment for second vaccine shot at a drug store or try to make it at his PCP's clinic. Pt is told to make the appointment in 3-4 weeks. Pt understood. Pt has a lab drawing on Wednesday for his Tacolimus level. No medications needed upon discharge. All education completed.

## 2021-12-14 ENCOUNTER — TELEPHONE (OUTPATIENT)
Dept: TRANSPLANT | Facility: CLINIC | Age: 66
End: 2021-12-14

## 2021-12-14 NOTE — TELEPHONE ENCOUNTER
Pt c/o constipation. He states he had a small bm last evening but still feels bloated. He ran out of miralax. Writer encouraged him to increase water intake, move, and get another stool softener on board. Pt is going to pharmacy today. If no relief, pt to notify writer. Pt verbalized and agrees with plan.

## 2021-12-15 ENCOUNTER — APPOINTMENT (OUTPATIENT)
Dept: GENERAL RADIOLOGY | Facility: CLINIC | Age: 66
DRG: 871 | End: 2021-12-15
Attending: EMERGENCY MEDICINE
Payer: COMMERCIAL

## 2021-12-15 ENCOUNTER — APPOINTMENT (OUTPATIENT)
Dept: CT IMAGING | Facility: CLINIC | Age: 66
DRG: 871 | End: 2021-12-15
Attending: EMERGENCY MEDICINE
Payer: COMMERCIAL

## 2021-12-15 ENCOUNTER — HOSPITAL ENCOUNTER (INPATIENT)
Facility: CLINIC | Age: 66
LOS: 2 days | Discharge: HOME | DRG: 871 | End: 2021-12-17
Attending: EMERGENCY MEDICINE | Admitting: INTERNAL MEDICINE
Payer: COMMERCIAL

## 2021-12-15 ENCOUNTER — APPOINTMENT (OUTPATIENT)
Dept: CT IMAGING | Facility: CLINIC | Age: 66
DRG: 871 | End: 2021-12-15
Attending: STUDENT IN AN ORGANIZED HEALTH CARE EDUCATION/TRAINING PROGRAM
Payer: COMMERCIAL

## 2021-12-15 DIAGNOSIS — M1A.3790 CHRONIC GOUT DUE TO RENAL IMPAIRMENT INVOLVING FOOT WITHOUT TOPHUS, UNSPECIFIED LATERALITY: ICD-10-CM

## 2021-12-15 DIAGNOSIS — Z94.1 S/P ORTHOTOPIC HEART TRANSPLANT (H): ICD-10-CM

## 2021-12-15 DIAGNOSIS — R47.01 EXPRESSIVE APHASIA: ICD-10-CM

## 2021-12-15 DIAGNOSIS — Z94.1 HEART REPLACED BY TRANSPLANT (H): ICD-10-CM

## 2021-12-15 DIAGNOSIS — K59.03 DRUG-INDUCED CONSTIPATION: ICD-10-CM
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
## 2021-12-15 DIAGNOSIS — M10.369 ACUTE GOUT DUE TO RENAL IMPAIRMENT INVOLVING KNEE, UNSPECIFIED LATERALITY: ICD-10-CM

## 2021-12-15 DIAGNOSIS — Z20.822 LAB TEST NEGATIVE FOR COVID-19 VIRUS: ICD-10-CM

## 2021-12-15 DIAGNOSIS — J18.9 HOSPITAL-ACQUIRED PNEUMONIA: ICD-10-CM

## 2021-12-15 DIAGNOSIS — J18.9 PNEUMONIA OF RIGHT LUNG DUE TO INFECTIOUS ORGANISM, UNSPECIFIED PART OF LUNG: Primary | ICD-10-CM

## 2021-12-15 LAB
ABO/RH(D): NORMAL
ALBUMIN UR-MCNC: 10 MG/DL
AMMONIA PLAS-SCNC: <10 UMOL/L (ref 10–50)
AMMONIA PLAS-SCNC: NORMAL UMOL/L
AMPHETAMINES UR QL SCN: NORMAL
ANION GAP SERPL CALCULATED.3IONS-SCNC: 8 MMOL/L (ref 3–14)
ANTIBODY SCREEN: NEGATIVE
APPEARANCE UR: CLEAR
APTT PPP: 34 SECONDS (ref 22–38)
BARBITURATES UR QL: NORMAL
BASOPHILS # BLD AUTO: 0.1 10E3/UL (ref 0–0.2)
BASOPHILS NFR BLD AUTO: 0 %
BENZODIAZ UR QL: NORMAL
BILIRUB UR QL STRIP: NEGATIVE
BUN SERPL-MCNC: 25 MG/DL (ref 7–30)
CALCIUM SERPL-MCNC: 8.7 MG/DL (ref 8.5–10.1)
CANNABINOIDS UR QL SCN: NORMAL
CHLORIDE BLD-SCNC: 105 MMOL/L (ref 94–109)
CO2 SERPL-SCNC: 23 MMOL/L (ref 20–32)
COCAINE UR QL: NORMAL
COLOR UR AUTO: ABNORMAL
CREAT BLD-MCNC: 1.7 MG/DL (ref 0.7–1.3)
CREAT SERPL-MCNC: 1.56 MG/DL (ref 0.66–1.25)
EOSINOPHIL # BLD AUTO: 0.1 10E3/UL (ref 0–0.7)
EOSINOPHIL NFR BLD AUTO: 0 %
ERYTHROCYTE [DISTWIDTH] IN BLOOD BY AUTOMATED COUNT: 13.7 % (ref 10–15)
ETHANOL SERPL-MCNC: <0.01 G/DL
FLUAV RNA SPEC QL NAA+PROBE: NEGATIVE
FLUBV RNA RESP QL NAA+PROBE: NEGATIVE
GFR SERPL CREATININE-BSD FRML MDRD: 42 ML/MIN/1.73M2
GFR SERPL CREATININE-BSD FRML MDRD: 46 ML/MIN/1.73M2
GLUCOSE BLD-MCNC: 174 MG/DL (ref 70–99)
GLUCOSE BLDC GLUCOMTR-MCNC: 123 MG/DL (ref 70–99)
GLUCOSE BLDC GLUCOMTR-MCNC: 179 MG/DL (ref 70–99)
GLUCOSE UR STRIP-MCNC: NEGATIVE MG/DL
HCT VFR BLD AUTO: 34.9 % (ref 40–53)
HGB BLD-MCNC: 11.1 G/DL (ref 13.3–17.7)
HGB UR QL STRIP: NEGATIVE
HOLD SPECIMEN: NORMAL
HYALINE CASTS: 4 /LPF
IMM GRANULOCYTES # BLD: 0.1 10E3/UL
IMM GRANULOCYTES NFR BLD: 1 %
INR PPP: 1.25 (ref 0.85–1.15)
KETONES UR STRIP-MCNC: NEGATIVE MG/DL
LACTATE SERPL-SCNC: 1 MMOL/L (ref 0.7–2)
LACTATE SERPL-SCNC: 2.4 MMOL/L (ref 0.7–2)
LEUKOCYTE ESTERASE UR QL STRIP: NEGATIVE
LYMPHOCYTES # BLD AUTO: 0.7 10E3/UL (ref 0.8–5.3)
LYMPHOCYTES NFR BLD AUTO: 4 %
MCH RBC QN AUTO: 30.2 PG (ref 26.5–33)
MCHC RBC AUTO-ENTMCNC: 31.8 G/DL (ref 31.5–36.5)
MCV RBC AUTO: 95 FL (ref 78–100)
MONOCYTES # BLD AUTO: 1 10E3/UL (ref 0–1.3)
MONOCYTES NFR BLD AUTO: 6 %
NEUTROPHILS # BLD AUTO: 15.4 10E3/UL (ref 1.6–8.3)
NEUTROPHILS NFR BLD AUTO: 89 %
NITRATE UR QL: NEGATIVE
NRBC # BLD AUTO: 0 10E3/UL
NRBC BLD AUTO-RTO: 0 /100
OPIATES UR QL SCN: NORMAL
PH UR STRIP: 5.5 [PH] (ref 5–7)
PLATELET # BLD AUTO: 215 10E3/UL (ref 150–450)
POTASSIUM BLD-SCNC: 4.3 MMOL/L (ref 3.4–5.3)
RBC # BLD AUTO: 3.68 10E6/UL (ref 4.4–5.9)
RBC URINE: 2 /HPF
RSV RNA SPEC NAA+PROBE: NEGATIVE
SARS-COV-2 RNA RESP QL NAA+PROBE: NEGATIVE
SODIUM SERPL-SCNC: 136 MMOL/L (ref 133–144)
SP GR UR STRIP: 1.03 (ref 1–1.03)
SPECIMEN EXPIRATION DATE: NORMAL
SQUAMOUS EPITHELIAL: <1 /HPF
TACROLIMUS BLD-MCNC: 6.3 UG/L (ref 5–15)
TME LAST DOSE: NORMAL H
TME LAST DOSE: NORMAL H
TRANSITIONAL EPI: <1 /HPF
TROPONIN I SERPL HS-MCNC: 10 NG/L
TSH SERPL DL<=0.005 MIU/L-ACNC: 2.08 MU/L (ref 0.4–4)
UROBILINOGEN UR STRIP-MCNC: NORMAL MG/DL
WBC # BLD AUTO: 17.3 10E3/UL (ref 4–11)
WBC URINE: 1 /HPF

## 2021-12-15 PROCEDURE — 71045 X-RAY EXAM CHEST 1 VIEW: CPT | Mod: 26 | Performed by: RADIOLOGY

## 2021-12-15 PROCEDURE — C9803 HOPD COVID-19 SPEC COLLECT: HCPCS | Performed by: EMERGENCY MEDICINE

## 2021-12-15 PROCEDURE — 70498 CT ANGIOGRAPHY NECK: CPT | Mod: 26 | Performed by: RADIOLOGY

## 2021-12-15 PROCEDURE — 87637 SARSCOV2&INF A&B&RSV AMP PRB: CPT | Performed by: EMERGENCY MEDICINE

## 2021-12-15 PROCEDURE — 96365 THER/PROPH/DIAG IV INF INIT: CPT | Mod: 59 | Performed by: EMERGENCY MEDICINE

## 2021-12-15 PROCEDURE — 258N000003 HC RX IP 258 OP 636: Performed by: STUDENT IN AN ORGANIZED HEALTH CARE EDUCATION/TRAINING PROGRAM

## 2021-12-15 PROCEDURE — 81001 URINALYSIS AUTO W/SCOPE: CPT | Performed by: EMERGENCY MEDICINE

## 2021-12-15 PROCEDURE — 87449 NOS EACH ORGANISM AG IA: CPT | Performed by: NURSE PRACTITIONER

## 2021-12-15 PROCEDURE — 36415 COLL VENOUS BLD VENIPUNCTURE: CPT | Performed by: EMERGENCY MEDICINE

## 2021-12-15 PROCEDURE — 80307 DRUG TEST PRSMV CHEM ANLYZR: CPT | Performed by: INTERNAL MEDICINE

## 2021-12-15 PROCEDURE — 87305 ASPERGILLUS AG IA: CPT | Performed by: NURSE PRACTITIONER

## 2021-12-15 PROCEDURE — 99285 EMERGENCY DEPT VISIT HI MDM: CPT | Mod: 25 | Performed by: EMERGENCY MEDICINE

## 2021-12-15 PROCEDURE — 250N000011 HC RX IP 250 OP 636: Performed by: STUDENT IN AN ORGANIZED HEALTH CARE EDUCATION/TRAINING PROGRAM

## 2021-12-15 PROCEDURE — 82565 ASSAY OF CREATININE: CPT

## 2021-12-15 PROCEDURE — 250N000011 HC RX IP 250 OP 636: Performed by: EMERGENCY MEDICINE

## 2021-12-15 PROCEDURE — 36415 COLL VENOUS BLD VENIPUNCTURE: CPT | Performed by: NURSE PRACTITIONER

## 2021-12-15 PROCEDURE — 93005 ELECTROCARDIOGRAM TRACING: CPT | Performed by: EMERGENCY MEDICINE

## 2021-12-15 PROCEDURE — 70498 CT ANGIOGRAPHY NECK: CPT

## 2021-12-15 PROCEDURE — 84484 ASSAY OF TROPONIN QUANT: CPT | Performed by: EMERGENCY MEDICINE

## 2021-12-15 PROCEDURE — 80307 DRUG TEST PRSMV CHEM ANLYZR: CPT | Performed by: NURSE PRACTITIONER

## 2021-12-15 PROCEDURE — 80048 BASIC METABOLIC PNL TOTAL CA: CPT | Performed by: EMERGENCY MEDICINE

## 2021-12-15 PROCEDURE — 84443 ASSAY THYROID STIM HORMONE: CPT | Performed by: NURSE PRACTITIONER

## 2021-12-15 PROCEDURE — 83605 ASSAY OF LACTIC ACID: CPT | Performed by: STUDENT IN AN ORGANIZED HEALTH CARE EDUCATION/TRAINING PROGRAM

## 2021-12-15 PROCEDURE — 86901 BLOOD TYPING SEROLOGIC RH(D): CPT | Performed by: EMERGENCY MEDICINE

## 2021-12-15 PROCEDURE — 70496 CT ANGIOGRAPHY HEAD: CPT | Mod: 26 | Performed by: RADIOLOGY

## 2021-12-15 PROCEDURE — 96366 THER/PROPH/DIAG IV INF ADDON: CPT | Performed by: EMERGENCY MEDICINE

## 2021-12-15 PROCEDURE — 82077 ASSAY SPEC XCP UR&BREATH IA: CPT | Performed by: NURSE PRACTITIONER

## 2021-12-15 PROCEDURE — 250N000012 HC RX MED GY IP 250 OP 636 PS 637: Performed by: NURSE PRACTITIONER

## 2021-12-15 PROCEDURE — 250N000013 HC RX MED GY IP 250 OP 250 PS 637: Performed by: EMERGENCY MEDICINE

## 2021-12-15 PROCEDURE — 82140 ASSAY OF AMMONIA: CPT | Performed by: EMERGENCY MEDICINE

## 2021-12-15 PROCEDURE — 93010 ELECTROCARDIOGRAM REPORT: CPT | Performed by: EMERGENCY MEDICINE

## 2021-12-15 PROCEDURE — 999N000128 HC STATISTIC PERIPHERAL IV START W/O US GUIDANCE

## 2021-12-15 PROCEDURE — 85730 THROMBOPLASTIN TIME PARTIAL: CPT | Performed by: EMERGENCY MEDICINE

## 2021-12-15 PROCEDURE — 71045 X-RAY EXAM CHEST 1 VIEW: CPT

## 2021-12-15 PROCEDURE — 85610 PROTHROMBIN TIME: CPT | Performed by: EMERGENCY MEDICINE

## 2021-12-15 PROCEDURE — 70450 CT HEAD/BRAIN W/O DYE: CPT

## 2021-12-15 PROCEDURE — 36415 COLL VENOUS BLD VENIPUNCTURE: CPT | Performed by: STUDENT IN AN ORGANIZED HEALTH CARE EDUCATION/TRAINING PROGRAM

## 2021-12-15 PROCEDURE — 86850 RBC ANTIBODY SCREEN: CPT | Performed by: EMERGENCY MEDICINE

## 2021-12-15 PROCEDURE — 250N000013 HC RX MED GY IP 250 OP 250 PS 637: Performed by: NURSE PRACTITIONER

## 2021-12-15 PROCEDURE — 250N000012 HC RX MED GY IP 250 OP 636 PS 637: Performed by: EMERGENCY MEDICINE

## 2021-12-15 PROCEDURE — 250N000012 HC RX MED GY IP 250 OP 636 PS 637

## 2021-12-15 PROCEDURE — 85025 COMPLETE CBC W/AUTO DIFF WBC: CPT | Performed by: EMERGENCY MEDICINE

## 2021-12-15 PROCEDURE — 71250 CT THORAX DX C-: CPT | Mod: 26 | Performed by: RADIOLOGY

## 2021-12-15 PROCEDURE — 70450 CT HEAD/BRAIN W/O DYE: CPT | Mod: 26 | Performed by: RADIOLOGY

## 2021-12-15 PROCEDURE — 99223 1ST HOSP IP/OBS HIGH 75: CPT | Performed by: INTERNAL MEDICINE

## 2021-12-15 PROCEDURE — 0042T CT HEAD PERFUSION WITH CONTRAST: CPT | Mod: GC | Performed by: RADIOLOGY

## 2021-12-15 PROCEDURE — 83605 ASSAY OF LACTIC ACID: CPT | Performed by: EMERGENCY MEDICINE

## 2021-12-15 PROCEDURE — 120N000001 HC R&B MED SURG/OB

## 2021-12-15 PROCEDURE — 71250 CT THORAX DX C-: CPT

## 2021-12-15 PROCEDURE — 0042T CT HEAD PERFUSION WITH CONTRAST: CPT

## 2021-12-15 PROCEDURE — 80197 ASSAY OF TACROLIMUS: CPT | Performed by: EMERGENCY MEDICINE

## 2021-12-15 PROCEDURE — 99222 1ST HOSP IP/OBS MODERATE 55: CPT | Mod: GC | Performed by: PSYCHIATRY & NEUROLOGY

## 2021-12-15 RX ORDER — TACROLIMUS 1 MG/1
4 CAPSULE ORAL ONCE
Status: COMPLETED | OUTPATIENT
Start: 2021-12-15 | End: 2021-12-15

## 2021-12-15 RX ORDER — MYCOPHENOLATE MOFETIL 250 MG/1
500 CAPSULE ORAL 2 TIMES DAILY
Status: DISCONTINUED | OUTPATIENT
Start: 2021-12-15 | End: 2021-12-17 | Stop reason: HOSPADM

## 2021-12-15 RX ORDER — PIPERACILLIN SODIUM, TAZOBACTAM SODIUM 4; .5 G/20ML; G/20ML
4.5 INJECTION, POWDER, LYOPHILIZED, FOR SOLUTION INTRAVENOUS EVERY 6 HOURS
Status: DISCONTINUED | OUTPATIENT
Start: 2021-12-15 | End: 2021-12-17 | Stop reason: HOSPADM

## 2021-12-15 RX ORDER — BUPROPION HYDROCHLORIDE 75 MG/1
75 TABLET ORAL 2 TIMES DAILY
Status: DISCONTINUED | OUTPATIENT
Start: 2021-12-15 | End: 2021-12-17 | Stop reason: HOSPADM

## 2021-12-15 RX ORDER — ASPIRIN 81 MG/1
81 TABLET, CHEWABLE ORAL DAILY
Status: DISCONTINUED | OUTPATIENT
Start: 2021-12-16 | End: 2021-12-17 | Stop reason: HOSPADM

## 2021-12-15 RX ORDER — ALLOPURINOL 300 MG/1
300 TABLET ORAL DAILY
Status: DISCONTINUED | OUTPATIENT
Start: 2021-12-15 | End: 2021-12-17 | Stop reason: HOSPADM

## 2021-12-15 RX ORDER — IOPAMIDOL 755 MG/ML
75 INJECTION, SOLUTION INTRAVASCULAR ONCE
Status: COMPLETED | OUTPATIENT
Start: 2021-12-15 | End: 2021-12-15

## 2021-12-15 RX ORDER — ASPIRIN 325 MG
325 TABLET ORAL ONCE
Status: COMPLETED | OUTPATIENT
Start: 2021-12-15 | End: 2021-12-15

## 2021-12-15 RX ORDER — TACROLIMUS 1 MG/1
3 CAPSULE ORAL
Status: DISCONTINUED | OUTPATIENT
Start: 2021-12-16 | End: 2021-12-16

## 2021-12-15 RX ORDER — AMOXICILLIN 250 MG
1 CAPSULE ORAL AT BEDTIME
Status: DISCONTINUED | OUTPATIENT
Start: 2021-12-15 | End: 2021-12-17 | Stop reason: HOSPADM

## 2021-12-15 RX ORDER — ACETAMINOPHEN 325 MG/1
975 TABLET ORAL EVERY 6 HOURS PRN
Status: DISCONTINUED | OUTPATIENT
Start: 2021-12-15 | End: 2021-12-17 | Stop reason: HOSPADM

## 2021-12-15 RX ORDER — IOPAMIDOL 755 MG/ML
40 INJECTION, SOLUTION INTRAVASCULAR ONCE
Status: COMPLETED | OUTPATIENT
Start: 2021-12-15 | End: 2021-12-15

## 2021-12-15 RX ORDER — TACROLIMUS 1 MG/1
3 CAPSULE ORAL
Status: DISCONTINUED | OUTPATIENT
Start: 2021-12-15 | End: 2021-12-15

## 2021-12-15 RX ORDER — LORAZEPAM 0.5 MG/1
0.5 TABLET ORAL ONCE
Status: DISCONTINUED | OUTPATIENT
Start: 2021-12-15 | End: 2021-12-16

## 2021-12-15 RX ORDER — PIPERACILLIN SODIUM, TAZOBACTAM SODIUM 4; .5 G/20ML; G/20ML
4.5 INJECTION, POWDER, LYOPHILIZED, FOR SOLUTION INTRAVENOUS ONCE
Status: COMPLETED | OUTPATIENT
Start: 2021-12-15 | End: 2021-12-15

## 2021-12-15 RX ORDER — PANTOPRAZOLE SODIUM 40 MG/1
40 TABLET, DELAYED RELEASE ORAL
Status: DISCONTINUED | OUTPATIENT
Start: 2021-12-15 | End: 2021-12-17 | Stop reason: HOSPADM

## 2021-12-15 RX ORDER — TACROLIMUS 1 MG/1
3 CAPSULE ORAL
Status: COMPLETED | OUTPATIENT
Start: 2021-12-15 | End: 2021-12-15

## 2021-12-15 RX ORDER — ALBUTEROL SULFATE 90 UG/1
2 AEROSOL, METERED RESPIRATORY (INHALATION) EVERY 4 HOURS PRN
Status: DISCONTINUED | OUTPATIENT
Start: 2021-12-15 | End: 2021-12-17 | Stop reason: HOSPADM

## 2021-12-15 RX ORDER — LORAZEPAM 2 MG/ML
0.25 INJECTION INTRAMUSCULAR ONCE
Status: DISCONTINUED | OUTPATIENT
Start: 2021-12-15 | End: 2021-12-16

## 2021-12-15 RX ORDER — DEXTROSE MONOHYDRATE 25 G/50ML
25-50 INJECTION, SOLUTION INTRAVENOUS
Status: DISCONTINUED | OUTPATIENT
Start: 2021-12-15 | End: 2021-12-17 | Stop reason: HOSPADM

## 2021-12-15 RX ORDER — NICOTINE POLACRILEX 4 MG
15-30 LOZENGE BUCCAL
Status: DISCONTINUED | OUTPATIENT
Start: 2021-12-15 | End: 2021-12-17 | Stop reason: HOSPADM

## 2021-12-15 RX ORDER — MONTELUKAST SODIUM 10 MG/1
10 TABLET ORAL AT BEDTIME
Status: DISCONTINUED | OUTPATIENT
Start: 2021-12-15 | End: 2021-12-17 | Stop reason: HOSPADM

## 2021-12-15 RX ORDER — FERROUS SULFATE 325(65) MG
325 TABLET ORAL
Status: DISCONTINUED | OUTPATIENT
Start: 2021-12-16 | End: 2021-12-17 | Stop reason: HOSPADM

## 2021-12-15 RX ADMIN — IOPAMIDOL 40 ML: 755 INJECTION, SOLUTION INTRAVENOUS at 10:59

## 2021-12-15 RX ADMIN — PIPERACILLIN SODIUM,TAZOBACTAM SODIUM 4.5 G: 4; .5 INJECTION, POWDER, FOR SOLUTION INTRAVENOUS at 21:10

## 2021-12-15 RX ADMIN — Medication 1 TABLET: at 19:39

## 2021-12-15 RX ADMIN — ASPIRIN 325 MG ORAL TABLET 325 MG: 325 PILL ORAL at 13:00

## 2021-12-15 RX ADMIN — PANTOPRAZOLE SODIUM 40 MG: 40 TABLET, DELAYED RELEASE ORAL at 19:40

## 2021-12-15 RX ADMIN — ACETAMINOPHEN 975 MG: 325 TABLET, FILM COATED ORAL at 18:47

## 2021-12-15 RX ADMIN — FLUTICASONE FUROATE AND VILANTEROL TRIFENATATE 1 PUFF: 100; 25 POWDER RESPIRATORY (INHALATION) at 20:35

## 2021-12-15 RX ADMIN — MYCOPHENOLATE MOFETIL 500 MG: 250 CAPSULE ORAL at 15:31

## 2021-12-15 RX ADMIN — BUPROPION HYDROCHLORIDE 75 MG: 75 TABLET, FILM COATED ORAL at 19:39

## 2021-12-15 RX ADMIN — IOPAMIDOL 75 ML: 755 INJECTION, SOLUTION INTRAVENOUS at 10:22

## 2021-12-15 RX ADMIN — PIPERACILLIN AND TAZOBACTAM 4.5 G: 4; .5 INJECTION, POWDER, FOR SOLUTION INTRAVENOUS at 13:00

## 2021-12-15 RX ADMIN — DOCUSATE SODIUM 50 MG AND SENNOSIDES 8.6 MG 1 TABLET: 8.6; 5 TABLET, FILM COATED ORAL at 21:11

## 2021-12-15 RX ADMIN — MONTELUKAST 10 MG: 10 TABLET, FILM COATED ORAL at 20:36

## 2021-12-15 RX ADMIN — MYCOPHENOLATE MOFETIL 500 MG: 250 CAPSULE ORAL at 19:39

## 2021-12-15 RX ADMIN — UMECLIDINIUM 1 PUFF: 62.5 AEROSOL, POWDER ORAL at 20:35

## 2021-12-15 RX ADMIN — SODIUM CHLORIDE, POTASSIUM CHLORIDE, SODIUM LACTATE AND CALCIUM CHLORIDE 500 ML: 600; 310; 30; 20 INJECTION, SOLUTION INTRAVENOUS at 21:15

## 2021-12-15 RX ADMIN — TACROLIMUS 3 MG: 1 CAPSULE ORAL at 14:10

## 2021-12-15 RX ADMIN — TACROLIMUS 4 MG: 1 CAPSULE ORAL at 19:39

## 2021-12-15 RX ADMIN — ALLOPURINOL 300 MG: 300 TABLET ORAL at 15:31

## 2021-12-15 ASSESSMENT — ACTIVITIES OF DAILY LIVING (ADL)
ADLS_ACUITY_SCORE: 9
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
## 2021-12-15 NOTE — PROGRESS NOTES
Stroke Code Nurse-Responder Note    Arrival Time to Stroke Code: 1014    Stroke Code Team interventions:   CT,CTA, CT perfusion, labs    ED/Bedside Nurse providing handoff: Prisca    Time left for CT: 1018    Time arrived to next location (ED/Unit/IR): 1019    ED/Bedside Nurse given handoff (name/time):Prisca, 1050    Helen Pepe RN
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
## 2021-12-15 NOTE — PHARMACY
Pharmacy Stroke Code Response  Pharmacist responded as part of the Stroke Code Team activation to patient care area: Emergency Department.  The Stroke Team determined that the patient was not a candidate for IV thrombolytic therapy and the pharmacy team was dismissed at 1110.
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
## 2021-12-16 ENCOUNTER — APPOINTMENT (OUTPATIENT)
Dept: MRI IMAGING | Facility: CLINIC | Age: 66
DRG: 871 | End: 2021-12-16
Attending: STUDENT IN AN ORGANIZED HEALTH CARE EDUCATION/TRAINING PROGRAM
Payer: COMMERCIAL

## 2021-12-16 PROBLEM — Z86.19: Status: ACTIVE | Noted: 2021-12-16

## 2021-12-16 LAB
AMPHETAMINES UR QL SCN: NORMAL
ANION GAP SERPL CALCULATED.3IONS-SCNC: 11 MMOL/L (ref 3–14)
ATRIAL RATE - MUSE: 109 BPM
BARBITURATES UR QL: NORMAL
BENZODIAZ UR QL: NORMAL
BUN SERPL-MCNC: 21 MG/DL (ref 7–30)
CALCIUM SERPL-MCNC: 8.5 MG/DL (ref 8.5–10.1)
CANNABINOIDS UR QL SCN: NORMAL
CHLORIDE BLD-SCNC: 105 MMOL/L (ref 94–109)
CO2 SERPL-SCNC: 22 MMOL/L (ref 20–32)
COCAINE UR QL: NORMAL
CREAT SERPL-MCNC: 1.52 MG/DL (ref 0.66–1.25)
DIASTOLIC BLOOD PRESSURE - MUSE: NORMAL MMHG
ERYTHROCYTE [DISTWIDTH] IN BLOOD BY AUTOMATED COUNT: 13.7 % (ref 10–15)
GFR SERPL CREATININE-BSD FRML MDRD: 48 ML/MIN/1.73M2
GLUCOSE BLD-MCNC: 104 MG/DL (ref 70–99)
GLUCOSE BLDC GLUCOMTR-MCNC: 160 MG/DL (ref 70–99)
GLUCOSE BLDC GLUCOMTR-MCNC: 204 MG/DL (ref 70–99)
GLUCOSE BLDC GLUCOMTR-MCNC: 92 MG/DL (ref 70–99)
GLUCOSE BLDC GLUCOMTR-MCNC: 94 MG/DL (ref 70–99)
HCT VFR BLD AUTO: 31.7 % (ref 40–53)
HGB BLD-MCNC: 10.1 G/DL (ref 13.3–17.7)
INTERPRETATION ECG - MUSE: NORMAL
LACTATE SERPL-SCNC: 0.7 MMOL/L (ref 0.7–2)
MCH RBC QN AUTO: 30.1 PG (ref 26.5–33)
MCHC RBC AUTO-ENTMCNC: 31.9 G/DL (ref 31.5–36.5)
MCV RBC AUTO: 95 FL (ref 78–100)
OPIATES UR QL SCN: NORMAL
P AXIS - MUSE: 48 DEGREES
PLATELET # BLD AUTO: 196 10E3/UL (ref 150–450)
POTASSIUM BLD-SCNC: 4.1 MMOL/L (ref 3.4–5.3)
PR INTERVAL - MUSE: 148 MS
QRS DURATION - MUSE: 92 MS
QT - MUSE: 342 MS
QTC - MUSE: 460 MS
R AXIS - MUSE: 41 DEGREES
RBC # BLD AUTO: 3.35 10E6/UL (ref 4.4–5.9)
SODIUM SERPL-SCNC: 138 MMOL/L (ref 133–144)
SYSTOLIC BLOOD PRESSURE - MUSE: NORMAL MMHG
T AXIS - MUSE: 47 DEGREES
TACROLIMUS BLD-MCNC: 3.6 UG/L (ref 5–15)
TME LAST DOSE: ABNORMAL H
TME LAST DOSE: ABNORMAL H
VENTRICULAR RATE- MUSE: 109 BPM
WBC # BLD AUTO: 11.4 10E3/UL (ref 4–11)

## 2021-12-16 PROCEDURE — 85027 COMPLETE CBC AUTOMATED: CPT | Performed by: NURSE PRACTITIONER

## 2021-12-16 PROCEDURE — 70551 MRI BRAIN STEM W/O DYE: CPT | Mod: 26 | Performed by: RADIOLOGY

## 2021-12-16 PROCEDURE — 250N000013 HC RX MED GY IP 250 OP 250 PS 637: Performed by: STUDENT IN AN ORGANIZED HEALTH CARE EDUCATION/TRAINING PROGRAM

## 2021-12-16 PROCEDURE — 250N000013 HC RX MED GY IP 250 OP 250 PS 637: Performed by: NURSE PRACTITIONER

## 2021-12-16 PROCEDURE — 250N000011 HC RX IP 250 OP 636: Performed by: STUDENT IN AN ORGANIZED HEALTH CARE EDUCATION/TRAINING PROGRAM

## 2021-12-16 PROCEDURE — 99232 SBSQ HOSP IP/OBS MODERATE 35: CPT | Performed by: INTERNAL MEDICINE

## 2021-12-16 PROCEDURE — 87040 BLOOD CULTURE FOR BACTERIA: CPT | Performed by: INTERNAL MEDICINE

## 2021-12-16 PROCEDURE — 99223 1ST HOSP IP/OBS HIGH 75: CPT | Mod: GC | Performed by: INTERNAL MEDICINE

## 2021-12-16 PROCEDURE — 250N000012 HC RX MED GY IP 250 OP 636 PS 637: Performed by: INTERNAL MEDICINE

## 2021-12-16 PROCEDURE — 36415 COLL VENOUS BLD VENIPUNCTURE: CPT | Performed by: INTERNAL MEDICINE

## 2021-12-16 PROCEDURE — 96361 HYDRATE IV INFUSION ADD-ON: CPT | Performed by: EMERGENCY MEDICINE

## 2021-12-16 PROCEDURE — 96366 THER/PROPH/DIAG IV INF ADDON: CPT | Performed by: EMERGENCY MEDICINE

## 2021-12-16 PROCEDURE — 250N000012 HC RX MED GY IP 250 OP 636 PS 637: Performed by: NURSE PRACTITIONER

## 2021-12-16 PROCEDURE — 82310 ASSAY OF CALCIUM: CPT | Performed by: NURSE PRACTITIONER

## 2021-12-16 PROCEDURE — 36415 COLL VENOUS BLD VENIPUNCTURE: CPT | Performed by: NURSE PRACTITIONER

## 2021-12-16 PROCEDURE — 214N000001 HC R&B CCU UMMC

## 2021-12-16 PROCEDURE — 80197 ASSAY OF TACROLIMUS: CPT | Performed by: NURSE PRACTITIONER

## 2021-12-16 PROCEDURE — 70551 MRI BRAIN STEM W/O DYE: CPT

## 2021-12-16 PROCEDURE — 250N000011 HC RX IP 250 OP 636: Performed by: NURSE PRACTITIONER

## 2021-12-16 RX ORDER — TACROLIMUS 1 MG/1
3 CAPSULE ORAL
Status: DISCONTINUED | OUTPATIENT
Start: 2021-12-17 | End: 2021-12-16

## 2021-12-16 RX ORDER — TACROLIMUS 5 MG/1
5 CAPSULE ORAL ONCE
Status: COMPLETED | OUTPATIENT
Start: 2021-12-16 | End: 2021-12-16

## 2021-12-16 RX ORDER — LANOLIN ALCOHOL/MO/W.PET/CERES
3 CREAM (GRAM) TOPICAL AT BEDTIME
Status: DISCONTINUED | OUTPATIENT
Start: 2021-12-16 | End: 2021-12-17 | Stop reason: HOSPADM

## 2021-12-16 RX ORDER — LORAZEPAM 2 MG/ML
0.5 INJECTION INTRAMUSCULAR ONCE
Status: COMPLETED | OUTPATIENT
Start: 2021-12-16 | End: 2021-12-16

## 2021-12-16 RX ORDER — TACROLIMUS 1 MG/1
4 CAPSULE ORAL
Status: DISCONTINUED | OUTPATIENT
Start: 2021-12-17 | End: 2021-12-17 | Stop reason: HOSPADM

## 2021-12-16 RX ORDER — TACROLIMUS 1 MG/1
4 CAPSULE ORAL
Status: DISCONTINUED | OUTPATIENT
Start: 2021-12-16 | End: 2021-12-16

## 2021-12-16 RX ORDER — DOCUSATE SODIUM 100 MG/1
100 CAPSULE, LIQUID FILLED ORAL 2 TIMES DAILY
Status: DISCONTINUED | OUTPATIENT
Start: 2021-12-16 | End: 2021-12-17 | Stop reason: HOSPADM

## 2021-12-16 RX ADMIN — PIPERACILLIN SODIUM,TAZOBACTAM SODIUM 4.5 G: 4; .5 INJECTION, POWDER, FOR SOLUTION INTRAVENOUS at 15:50

## 2021-12-16 RX ADMIN — PIPERACILLIN SODIUM,TAZOBACTAM SODIUM 4.5 G: 4; .5 INJECTION, POWDER, FOR SOLUTION INTRAVENOUS at 08:37

## 2021-12-16 RX ADMIN — BUPROPION HYDROCHLORIDE 75 MG: 75 TABLET, FILM COATED ORAL at 21:08

## 2021-12-16 RX ADMIN — FLUTICASONE FUROATE AND VILANTEROL TRIFENATATE 1 PUFF: 100; 25 POWDER RESPIRATORY (INHALATION) at 08:26

## 2021-12-16 RX ADMIN — MYCOPHENOLATE MOFETIL 500 MG: 250 CAPSULE ORAL at 20:08

## 2021-12-16 RX ADMIN — BUPROPION HYDROCHLORIDE 75 MG: 75 TABLET, FILM COATED ORAL at 08:22

## 2021-12-16 RX ADMIN — ACETAMINOPHEN 975 MG: 325 TABLET, FILM COATED ORAL at 08:36

## 2021-12-16 RX ADMIN — FERROUS SULFATE TAB 325 MG (65 MG ELEMENTAL FE) 325 MG: 325 (65 FE) TAB at 08:22

## 2021-12-16 RX ADMIN — TACROLIMUS 5 MG: 5 CAPSULE ORAL at 17:32

## 2021-12-16 RX ADMIN — Medication 3 MG: at 22:46

## 2021-12-16 RX ADMIN — ASPIRIN 81 MG CHEWABLE TABLET 81 MG: 81 TABLET CHEWABLE at 08:22

## 2021-12-16 RX ADMIN — PIPERACILLIN SODIUM,TAZOBACTAM SODIUM 4.5 G: 4; .5 INJECTION, POWDER, FOR SOLUTION INTRAVENOUS at 21:08

## 2021-12-16 RX ADMIN — MYCOPHENOLATE MOFETIL 500 MG: 250 CAPSULE ORAL at 08:22

## 2021-12-16 RX ADMIN — Medication 1 TABLET: at 20:08

## 2021-12-16 RX ADMIN — PANTOPRAZOLE SODIUM 40 MG: 40 TABLET, DELAYED RELEASE ORAL at 08:22

## 2021-12-16 RX ADMIN — PIPERACILLIN SODIUM,TAZOBACTAM SODIUM 4.5 G: 4; .5 INJECTION, POWDER, FOR SOLUTION INTRAVENOUS at 02:28

## 2021-12-16 RX ADMIN — LORAZEPAM 0.5 MG: 2 INJECTION INTRAMUSCULAR; INTRAVENOUS at 09:44

## 2021-12-16 RX ADMIN — TACROLIMUS 3 MG: 1 CAPSULE ORAL at 08:21

## 2021-12-16 RX ADMIN — Medication 1 TABLET: at 08:22

## 2021-12-16 RX ADMIN — DOCUSATE SODIUM 100 MG: 100 CAPSULE, LIQUID FILLED ORAL at 20:08

## 2021-12-16 RX ADMIN — PANTOPRAZOLE SODIUM 40 MG: 40 TABLET, DELAYED RELEASE ORAL at 15:48

## 2021-12-16 RX ADMIN — UMECLIDINIUM 1 PUFF: 62.5 AEROSOL, POWDER ORAL at 08:26

## 2021-12-16 RX ADMIN — MONTELUKAST 10 MG: 10 TABLET, FILM COATED ORAL at 22:46

## 2021-12-16 RX ADMIN — ALLOPURINOL 300 MG: 300 TABLET ORAL at 08:22

## 2021-12-16 RX ADMIN — DOCUSATE SODIUM 100 MG: 100 CAPSULE, LIQUID FILLED ORAL at 11:52

## 2021-12-16 RX ADMIN — ACETAMINOPHEN 975 MG: 325 TABLET, FILM COATED ORAL at 22:59

## 2021-12-16 ASSESSMENT — ACTIVITIES OF DAILY LIVING (ADL)
ADLS_ACUITY_SCORE: 13
TOILETING_ISSUES: NO
ADLS_ACUITY_SCORE: 17
ADLS_ACUITY_SCORE: 9
ADLS_ACUITY_SCORE: 13
ADLS_ACUITY_SCORE: 9
DRESSING/BATHING_DIFFICULTY: NO
PATIENT_/_FAMILY_COMMUNICATION_STYLE: SPOKEN LANGUAGE (ENGLISH OR BILINGUAL)
NUMBER_OF_TIMES_PATIENT_HAS_FALLEN_WITHIN_LAST_SIX_MONTHS: 9
ADLS_ACUITY_SCORE: 9
DOING_ERRANDS_INDEPENDENTLY_DIFFICULTY: NO
ADLS_ACUITY_SCORE: 13
CONCENTRATING,_REMEMBERING_OR_MAKING_DECISIONS_DIFFICULTY: YES
ADLS_ACUITY_SCORE: 13
WEAR_GLASSES_OR_BLIND: YES
DEPENDENT_IADLS:: INDEPENDENT
FALL_HISTORY_WITHIN_LAST_SIX_MONTHS: YES
WHICH_OF_THE_ABOVE_FUNCTIONAL_RISKS_HAD_A_RECENT_ONSET_OR_CHANGE?: FALL HISTORY
ADLS_ACUITY_SCORE: 9
EQUIPMENT_CURRENTLY_USED_AT_HOME: CANE, STRAIGHT
ADLS_ACUITY_SCORE: 9
ADLS_ACUITY_SCORE: 9
DIFFICULTY_EATING/SWALLOWING: NO
VISION_MANAGEMENT: GLASSES
HEARING_DIFFICULTY_OR_DEAF: NO
DIFFICULTY_COMMUNICATING: NO
WALKING_OR_CLIMBING_STAIRS_DIFFICULTY: NO
ADLS_ACUITY_SCORE: 9
ADLS_ACUITY_SCORE: 17
ADLS_ACUITY_SCORE: 17
ADLS_ACUITY_SCORE: 13
ADLS_ACUITY_SCORE: 17
ADLS_ACUITY_SCORE: 17
ADLS_ACUITY_SCORE: 13
ADLS_ACUITY_SCORE: 9
ADLS_ACUITY_SCORE: 17

## 2021-12-16 ASSESSMENT — MIFFLIN-ST. JEOR: SCORE: 1535.16
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
## 2021-12-16 NOTE — PLAN OF CARE
Admission          12/15/2021 10:08 AM  -----------------------------------------------------------  Diagnosis: Pneumonia.     Admitted from: ED.    Report given from: REGINALD Kearns  Via: Wheelchair.   Accompanied by: Transport.   Family Aware of Admission: Yes.  Belongings: Coat, cell phone, , pants, shirt, watch, wallet.   Admission Profile: Complete  Teaching: Orientation to unit, call don't fall, use of call light, meal times, visiting hours,  when to call for the RN (angina/sob/dizzyness, etc.), and enforced importance of safety.  Access: 2 PIVs on the right.   Telemetry: Placed on pt  Ht./Wt.: Complete  2 RN skin assessment: Yasmani  Temp:  [97.8  F (36.6  C)-98  F (36.7  C)] 98  F (36.7  C)  Pulse:  [] 110  Resp:  [16-20] 18  BP: (121-155)/() 121/89  SpO2:  [92 %-99 %] 92 %
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
## 2021-12-16 NOTE — PLAN OF CARE
0700 to 1530:/106,HR 99,A&Ox4 denied pain and nausea,up with SBA voided adequately no BM,LS clear ,BS+,c/o constipation colace given,head MRI showed no acute stroke,evidence of few chronic microhemorrhages,blood culture and urine culture needs to be collected.Will continue to monitor.

## 2021-12-16 NOTE — CONSULTS
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 Indeterminate*  --  Negative Negative   ASPGAI 0.08 0.05 0.05  --  0.06 0.08   ASPAG Negative  --   --   --   --   --    ASPGAA  --  Negative Negative  --  Negative Negative       Last Culture results with specimen source  Culture   Date Value Ref Range Status   08/30/2021 No Actinomyces isolated  Final   08/30/2021 No Legionella species isolated  Final   08/30/2021 No Growth  Final   08/30/2021 Candida albicans (A)  Final   08/30/2021 Candida parapsilosis (A)  Final   08/30/2021 Aspergillus fumigatus (A)  Final   08/30/2021 1+ Normal jaxon  Final   08/30/2021 1+ Staphylococcus aureus (A)  Final   08/24/2021 No Growth  Final   08/24/2021 No Growth  Final   07/11/2021 No anaerobic organisms  isolated  Final   07/11/2021 No Growth  Final   07/10/2021 No Growth  Final   07/10/2021 No Growth  Final     Culture Micro   Date Value Ref Range Status   07/09/2021 No growth  Final   07/08/2021 (A)  Preliminary    On day 2, isolated in broth only:  Staphylococcus epidermidis     07/08/2021   Preliminary    Critical Value/Significant Value, preliminary result only, called to and read back by  Neli Boswell RN 1527 7/10/21 AM      07/08/2021 No anaerobes isolated  Final   07/08/2021   Final    Since this specimen was not transported in the proper anaerobic transport media, the   absence of anaerobes in this culture does not rule out the presence of anaerobes in this   specimen.     07/08/2021 No growth  Final   07/08/2021 No growth  Final   06/30/2021 No growth  Final   06/30/2021 No growth  Final   06/29/2021 No growth  Final   06/29/2021 No anaerobes isolated  Final   06/29/2021 Culture negative after 4 weeks  Final   06/29/2021 Culture negative for acid fast bacilli  Final   06/29/2021   Final    Assayed at [facility] [address] [place] [id number] [phone]   06/29/2021 No growth after 4 weeks  Final   06/28/2021 No growth  Final   06/28/2021 (A)  Final    Cultured on the 2nd day of incubation:  Staphylococcus epidermidis     06/28/2021   Final    Critical Value/Significant Value, preliminary result only, called to and read back by  Lisa Rush RN 6/30/21 @ 0427 TF     06/28/2021   Final    (Note)  POSITIVE for STAPHYLOCOCCUS EPIDERMIDIS and POSITIVE for the mecA  gene (resistant to methicillin) by myEDmatchigene multiplex nucleic acid  test. Final identification and antimicrobial susceptibility testing  will be verified by standard methods.    Specimen tested with Verigene multiplex, gram-positive blood culture  nucleic acid test for the following targets: Staph aureus, Staph  epidermidis, Staph lugdunensis, other Staph species, Enterococcus  faecalis, Enterococcus faecium, Streptococcus  species, S. agalactiae,  S. anginosus grp., S. pneumoniae, S. pyogenes, Listeria sp., mecA  (methicillin resistance) and Lucía/B (vancomycin resistance).    Critical Value/Significant Value called to and read back by Darcie Saeed RN at 0701 6.30.21. amd      Specimen Description   Date Value Ref Range Status   07/09/2021 Midstream Urine  Final   07/08/2021 Synovial fluid Knee  Final   07/08/2021 Synovial fluid  Final   07/08/2021 Synovial fluid  Final   07/08/2021 Blood Right Hand  Final   07/08/2021 Blood Right Hand  Final   06/30/2021 Blood Right Hand  Final   06/30/2021 Blood Left Hand  Final   06/30/2021 Urine  Final   06/30/2021 Urine  Final   06/29/2021 Pleural fluid  Final   06/29/2021 Pleural fluid  Final   06/29/2021 Pleural fluid  Final   06/29/2021 Pleural fluid  Final   06/29/2021 Pleural fluid  Final   06/29/2021 Pleural fluid  Final   06/29/2021 Blood  Final   06/29/2021 Blood Right Hand  Final   06/29/2021 Nares  Final        Last check of C difficile  C Difficile Toxin B by PCR   Date Value Ref Range Status   09/13/2021 Negative Negative Final     Comment:     A negative result does not exclude actual disease due to C. difficile and may be due to improper collection, handling and storage of the specimen or the number of organisms in the specimen is below the detection limit of the assay.       Syphilis Testing    Treponema Antibodies   Date Value Ref Range Status   09/10/2020 Nonreactive NR^Nonreactive Final     Comment:     Methodology Change: Test performed on the [employer] Liaison XL by Treponema   pallidum Total Antibodies Assay as of 3.17.2020.         Quantiferon testing   Recent Labs   Lab Test 12/15/21  1015 12/12/21  0706 06/30/21  0339 06/29/21  1710 09/17/20  1554 09/10/20  0830   TBRST  --   --   --   --   --  Negative   LYMPH 4 31   < >  --    < >  --    AFBSMS  --   --   --  Negative for acid fast bacteria  Assayed at [facility] [address] [place] [address]  [phone]  --   --     < > = values in this interval not displayed.       Virology:  Coronavirus-19 testing    Recent Labs   Lab Test 12/15/21  1207 12/11/21  1825 10/31/21  1513 08/25/21  1139 07/17/21  1049 07/08/21  1627 06/28/21  2212 06/17/21  0902   [id number] Negative Negative Negative Positive*   < > NEGATIVE   < > Test received-See reflex to IDDL test SARS CoV2 (COVID-19) Virus RT-PCR  NEGATIVE   [id number]  --   --   --   --   --  Nasopharyngeal   < > Nasopharyngeal   [id number]  --   --   --   --   --   --   --  Nasopharyngeal    < > = values in this interval not displayed.       Respiratory virus (non-coronavirus-19) testing    Recent Labs   Lab Test 12/15/21  1207 08/30/21  1103 06/29/21  0914 03/05/21  1655 02/12/21  0915 01/15/20  2210   AFLU  --   --   --   --   --  Negative   IFLUA  --  Negative Not Detected Not Detected   < > Not Detected   INFZA Negative  --   --   --    < >  --    FLUAH1  --  Negative Not Detected Not Detected   < > Not Detected   BY6308  --  Negative Not Detected Not Detected   < > Not Detected   FLUAH3  --  Negative Not Detected Not Detected   < > Not Detected   BFLU  --   --   --   --   --  Positive*   IFLUB  --  Negative Not Detected Not Detected   < > Detected, Abnormal Result*   INFZB Negative  --   --   --    < >  --    PIV1  --  Negative Not Detected Not Detected   < > Not Detected   PIV2  --  Negative Not Detected Not Detected   < > Not Detected   PIV3  --  Negative Not Detected Not Detected   < > Not Detected   PIV4  --   --  Not Detected Not Detected   < > Not Detected   IRSV Negative  --   --   --   --   --    HRVS  --  Negative  --   --   --   --    RSVA  --  Negative Not Detected Not Detected   < > Not Detected   RSVB  --  Negative Not Detected Not Detected   < > Not Detected   HMPV  --  Negative Not Detected Not Detected   < > Not Detected   ADVBE  --  Negative  --   --   --   --    ADVC  --  Negative  --   --   --   --    ADENOV  --   --  Not Detected Not  Detected   < > Not Detected   CORONA  --   --  Not Detected Not Detected   < > Not Detected    < > = values in this interval not displayed.       CMV viral loads    Recent Labs   Lab Test 11/11/21  1212 11/04/21  0719 10/05/21  0819 09/21/21  0821 08/30/21  1103 08/24/21  1143 07/13/21  2053 06/29/21  1412 06/28/21  2208   CMVQNT Not Detected Not Detected Not Detected Not Detected Not Detected Not Detected Not Detected CMV DNA Not Detected Canceled, Test credited   CSPEC  --   --   --   --   --   --   --  Plasma Canceled, Test credited   CMVLOG  --   --   --   --   --   --   --  Not Calculated Canceled, Test credited       CMV resistance testing  No lab results found.    No results found for: H6RES    EBV DNA Copies/mL   Date Value Ref Range Status   11/04/2021 4,813 (H) <=0 copies/mL Final   10/05/2021 8,217 (H) <=0 copies/mL Final   09/21/2021 12,557 (H) <=0 copies/mL Final   08/24/2021 <500 (A) Not Detected copies/mL Final     Comment:     EBV DNA Detected below the reportable range of 500 copies/mL   08/05/2021   Final     Comment:     EBV DNA Detetected below the reportable range of 500 copies/mL   07/13/2021 1,618 (H) <=0 copies/mL Final   06/28/2021 EBV DNA Not Detected EBVNEG^EBV DNA Not Detected [Copies]/mL Final   06/07/2021 3,509 (A) EBVNEG^EBV DNA Not Detected [Copies]/mL Final       No results found for: 01460, BKRES    Parvovirus Testing  No lab results found.    Invalid input(s): PRVRES    Adenovirus Testing  No lab results found.    Invalid input(s): ADENAB, ADENOVIRUS, ADQT    Hepatitis B Testing     Recent Labs   Lab Test 06/07/21  0807 05/04/21  2313 04/13/21  0900 09/10/20  0830   AUSAB  --  0.00  --  0.00   HBCAB Nonreactive Nonreactive  --  Nonreactive   HEPBANG Nonreactive Nonreactive   < > Nonreactive    < > = values in this interval not displayed.        Hepatitis C Antibody   Date Value Ref Range Status   06/07/2021 Nonreactive NR^Nonreactive Final     Comment:     Assay performance  characteristics have not been established for newborns,   infants, and children     05/04/2021 Nonreactive NR^Nonreactive Final     Comment:     Assay performance characteristics have not been established for newborns,   infants, and children         CMV Antibody IgG   Date Value Ref Range Status   05/04/2021 >8.0 (H) 0.0 - 0.8 AI Final     Comment:     Positive  Antibody index (AI) values reflect qualitative changes in antibody   concentration that cannot be directly associated with clinical condition or   disease state.     09/10/2020 >8.0 (H) 0.0 - 0.8 AI Final     Comment:     Positive  Antibody index (AI) values reflect qualitative changes in antibody   concentration that cannot be directly associated with clinical condition or   disease state.       Varicella Zoster Virus Antibody IgG   Date Value Ref Range Status   09/10/2020 1.1 (H) 0.0 - 0.8 AI Final     Comment:     Positive, suggests prev. exposure and probable immunity  Antibody index (AI) values reflect qualitative changes in antibody   concentration that cannot be directly associated with clinical condition or   disease state.       EBV Capsid Antibody IgG   Date Value Ref Range Status   05/04/2021 5.4 (H) 0.0 - 0.8 AI Final     Comment:     Positive, suggests recent or past exposure  Antibody index (AI) values reflect qualitative changes in antibody   concentration that cannot be directly associated with clinical condition or   disease state.     09/10/2020 3.8 (H) 0.0 - 0.8 AI Final     Comment:     Positive, suggests recent or past exposure  Antibody index (AI) values reflect qualitative changes in antibody   concentration that cannot be directly associated with clinical condition or   disease state.       Toxoplasma Antibody IgG   Date Value Ref Range Status   09/10/2020 <3.0 0.0 - 7.1 IU/mL Final     Comment:     Negative- Absence of detectable Toxoplasma gondii IgG antibodies. A negative   result does not rule out acute infection.  The magnitude  of the measured result is not indicative of the amount of   antibody present. The concentrations of anti-Toxoplasma gondii IgG in a given   specimen determined with assays from different manufacturers can vary due to   differences in assay methods and reagent specificity.       Herpes Simplex Virus Type 1 IgG   Date Value Ref Range Status   09/10/2020 >8.0 (H) 0.0 - 0.8 AI Final     Comment:     Positive.  IgG antibody to HSV-1 detected.  Antibody index (AI) values reflect qualitative changes in antibody   concentration that cannot be directly associated with clinical condition or   disease state.       Herpes Simplex Virus Type 2 IgG   Date Value Ref Range Status   09/10/2020 <0.2 0.0 - 0.8 AI Final     Comment:     No HSV-2 IgG antibodies detected.  Antibody index (AI) values reflect qualitative changes in antibody   concentration that cannot be directly associated with clinical condition or   disease state.         Imaging:  Recent Results (from the past 48 hour(s))   CT Head w/o Contrast    Narrative    CT HEAD W/O CONTRAST 12/15/2021 10:53 AM    Provided History: Code Stroke to evaluate for potential thrombolysis  and thrombectomy.  PLEASE READ IMMEDIATELY.    Comparison: Head CT 12/1/2021.    Technique: Using multidetector thin collimation helical acquisition  technique, axial, coronal and sagittal CT images from the skull base  to the vertex were obtained without intravenous contrast.     Findings:    No intracranial hemorrhage, mass effect, or midline shift. The  ventricles are proportionate to the cerebral sulci. The gray to white  matter differentiation of the cerebral hemispheres is preserved. The  basal cisterns are patent. Scattered punctate foci of hypoattenuation  in the periventricular and supraventricular white matter, which is  nonspecific but likely secondary to small vessel ischemic disease.  Tiny hypodensity in the anterior limb of the right internal capsule  which likely represents a chronic  lacunar infarct.    The visualized paranasal sinuses are clear. The mastoid air cells are  clear.       Impression    Impression:   1. No acute intracranial hemorrhage.  2. ASPECT Score: 10/10.  3. 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  4. Stable mild chronic white matter changes, likely sequela of small  vessel ischemic disease.    I have personally reviewed the examination and initial interpretation  and I agree with the findings.    JEWELS HORTON MD         SYSTEM ID:  [id number]   CTA Head Neck with Contrast    Narrative    CTA  HEAD NECK WITH CONTRAST 12/15/2021 10:53 AM    Head CT without contrast  CT angiogram of the neck   CT angiogram of the base of the brain with contrast  Reconstruction by the Radiologist on the 3D workstation    Provided History:  Code Stroke to evaluate for potential thrombolysis  and thrombectomy.  PLEASE READ IMMEDIATELY.    Comparison:  Head CT 12/15/2021, 12/1/2001, 7/3/2021.      Technique:  HEAD and NECK CTA: During rapid bolus intravenous injection of  nonionic contrast material, axial images were obtained using thin  collimation multidetector helical technique from the base of the skull  through the [place]. This CT angiogram data was reconstructed  at thin intervals with mild overlap. Images were sent to the [employer]  workstation, and 3D reconstructions were obtained. The axial source  images, multiplanar reformations, 3D reconstructions in both maximum  intensity projection display and volume rendered models were reviewed,  with reconstructions performed by the technologist and the  radiologist.    Contrast: iopamidol (ISOVUE-370) solution 75 mL    Findings:  Head CTA demonstrates no aneurysm or stenosis of the major  intracranial arteries.    Neck CTA demonstrates no stenosis of the major cervical arteries.  Common origin of the left common carotid and right innominate  arteries, normal variant. The origins of the great vessels from  the  aortic arch are patent. The normal distal right internal carotid  artery measures 5 mm. The normal distal left internal carotid artery  measures 5 mm.     No mass is noted within the visualized portions of the cervical soft  tissues or lung apices. Multifocal patchy ground glass opacities in  the upper lobes. Enlarged mediastinal lymph nodes. Mild-to-moderate  diffuse anasarca.      Impression    Impression:    1. Head CTA demonstrates no aneurysm or stenosis of the major  intracranial arteries.   2. Neck CTA demonstrates no stenosis of the major cervical arteries.   3. Multifocal patchy ground glass opacities in the upper lobes, likely  representing infection. COVID-19 not excluded.    I have personally reviewed the examination and initial interpretation  and I agree with the findings.    JEWELS HORTON MD         SYSTEM ID:  [id number]   CT Head Perfusion w Contrast    Narrative    CT Perfusion Study of the head, 12/15/2021    History: Evaluate mismatch between penumbra and core infarct   Comparison: CT 12/15/2021, 12/1/2021    Contrast Dose: iopamidol (ISOVUE-370) solution 40 mL    Technique:   CT Perfusion: Dynamic perfusion CT of the brain was performed at  multiple levels, with 10 mm slice thickness; levels were obtained  during 2 separate injections:  1) the first centered at the level of   the basal ganglia, and 2) centered at the level of the top of the  lateral ventricles. Each scan required approximately 40 cc's of  intravenous nonionic contrast, per each of the 2  injections  performed.  Images were reviewed on the [employer] 3D workstation.    Findings:     CT Perfusion: No evidence of ischemia or acute infarction. There is  normal perfusion of the brain at the visualized levels.      Impression    Impression:   No evidence of ischemia or acute infarction on the CT Perfusion  examination.    I have personally reviewed the examination and initial interpretation  and I agree with the findings.    JEEWLS  MD CLIFFORD         SYSTEM ID:  [id number]   XR Chest Port 1 View    Narrative    Portable chest    INDICATION: Confusion    COMPARISON: Chest CT 10/15/2021 and most recent plain film 8/24/2021    Findings: Heart size normal. Mild elevation of left hemidiaphragm  persists. New slightly radiodense opacity laterally in the right lung  field on the CT scan, there was pleural thickening in this area. Given  the presence of right costophrenic angle haziness, this could indicate  loculated component of pleural effusion. CT scan of the chest may be  helpful for more detailed evaluation. There is atherosclerotic  calcification of the aortic knob. Median sternotomy again present.  Abandoned ICD wire/lead again noted from left subclavian approach into  the proximal SVC.      Impression    IMPRESSION: Increased radiodensity in the right lateral midlung field  which may indicate loculated pleural effusion. However, CT may be  helpful for further evaluation to exclude consolidation/mass. Right  pleural effusion suggested as well with costophrenic angle blunting.  Atherosclerosis.    PADILLA MCCORMICK MD         SYSTEM ID:  [id number]   Chest CT w/o contrast    Narrative    EXAMINATION: CT CHEST W/O CONTRAST, 12/15/2021 1:03 PM    TECHNIQUE:  Helical CT images from the thoracic inlet through the lung  bases were obtained without IV contrast.     COMPARISON: Same-day chest x-ray, chest CT 10/15/2021    HISTORY: Abnormal xray - pleural effusion    FINDINGS:    Chest: Thyroid gland is unremarkable. Tracheobronchial tree is  patent.. Small hiatal hernia. Postoperative changes of heart  transplantation. Normal heart size. No significant pericardial  effusion. Thoracic aorta and main pulmonary artery measure within  normal limits. Atherosclerosis of the thoracic aorta proximal great  vessels. Bovine arch. No suspicious lymphadenopathy. No pneumothorax.  Similar trace loculated right pleural effusion. Biapical scarring.  Scattered  bronchocentric and some peripheral groundglass and  consolidative opacities throughout the right lung, predominantly in  the upper lobe. Right middle and bilateral lower lobe bronchiectasis.  Scattered sub-6 mm solid nodules are unchanged, for example a 3 mm  nodule in the left upper lobe (series 6, 102).    Upper abdomen: Sylvester-en-Y. Excreted contrast in the renal collecting  systems.    Bones/soft tissues: Degenerative changes in the visualized spine. No  acute osseous abnormalities or suspicious bony lesions. Gynecomastia.  Anasarca. Unchanged sternal nonunion with sternotomy. Dystrophic  calcifications in the previous left ICD chest pocket site with  abandoned lead tip in the mid SVC.      Impression    IMPRESSION:  1. Scattered bronchocentric and some peripheral groundglass and  consolidative opacities throughout the right lung, predominantly in  the upper lobe, concerning for infection.   2. Similar trace loculated right pleural effusion.  3. Anasarca.  4. The remainder of the exam is not significantly changed since  10/15/2021.    I have personally reviewed the examination and initial interpretation  and I agree with the findings.    PADILLA MCCORMICK MD         SYSTEM ID:  [id number]   MR Brain for Stroke Limited    Narrative    MRI brain without contrast 12/16/2021    Provided History:  acute stroke assessment.    Comparison:  CT, CTA, and CT perfusion from previous day.    Technique:   Axial and sagittal diffusion-weighted (with ADC map), axial FLAIR, and  axial susceptibility-weighted images of the brain were obtained  without the administration of intravenous contrast.    Findings:   No mass effect, midline shift, or extra-axial fluid collection.  Ventricles are proportionate to the cerebral sulci. Diffusion weighted  images demonstrate no definite acute infarct. Scattered cerebral and  cerebellar punctate foci of low signal throughout the white matter.  Patchy to confluent periventricular and subcortical  FLAIR  hyperintensities, nonspecific, however, likely sequela of small vessel  ischemic disease.     Trace right mastoid effusion, left mastoid air cells are clear.  Paranasal sinuses are clear. Bilateral pseudophakia.      Impression    Impression:   1. No evidence of acute infarction or intracranial hemorrhage.  2. Numerous scattered punctate foci of cerebral and cerebellar  hypointense signal on susceptibility weighted images representing  prior microhemorrhages, possibly early amyloid angiopathy.    I have personally reviewed the examination and initial interpretation  and I agree with the findings.    CONI MEDINA MD         SYSTEM ID:  [id number]

## 2021-12-16 NOTE — PROGRESS NOTES
Notified by RN that patient had new lactate of 2.4 from previously normal. Chart reviewed and noted that zosyn hadn't been reordered by admitting provider. Reordered sepsis-dosing zosyn and gave 500 mL of lactated ringers with repeat lactate at 12/16 0000.    Jam Slaughter MD  [phone]
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
## 2021-12-17 VITALS
RESPIRATION RATE: 18 BRPM | BODY MASS INDEX: 25.61 KG/M2 | DIASTOLIC BLOOD PRESSURE: 86 MMHG | HEART RATE: 93 BPM | OXYGEN SATURATION: 97 % | SYSTOLIC BLOOD PRESSURE: 110 MMHG | WEIGHT: 169 LBS | TEMPERATURE: 98.3 F | HEIGHT: 68 IN

## 2021-12-17 LAB
1,3 BETA GLUCAN SER-MCNC: <31 PG/ML
ANION GAP SERPL CALCULATED.3IONS-SCNC: 10 MMOL/L (ref 3–14)
BUN SERPL-MCNC: 21 MG/DL (ref 7–30)
CALCIUM SERPL-MCNC: 8.3 MG/DL (ref 8.5–10.1)
CHLORIDE BLD-SCNC: 107 MMOL/L (ref 94–109)
CO2 SERPL-SCNC: 20 MMOL/L (ref 20–32)
CREAT SERPL-MCNC: 1.64 MG/DL (ref 0.66–1.25)
ERYTHROCYTE [DISTWIDTH] IN BLOOD BY AUTOMATED COUNT: 13.5 % (ref 10–15)
GALACTOMANNAN AG SERPL QL IA: NEGATIVE
GALACTOMANNAN AG SPEC IA-ACNC: 0.04
GFR SERPL CREATININE-BSD FRML MDRD: 44 ML/MIN/1.73M2
GLUCOSE BLD-MCNC: 87 MG/DL (ref 70–99)
HCT VFR BLD AUTO: 32.1 % (ref 40–53)
HGB BLD-MCNC: 10.2 G/DL (ref 13.3–17.7)
MCH RBC QN AUTO: 29.6 PG (ref 26.5–33)
MCHC RBC AUTO-ENTMCNC: 31.8 G/DL (ref 31.5–36.5)
MCV RBC AUTO: 93 FL (ref 78–100)
OBSERVATION IMP: NEGATIVE
PLATELET # BLD AUTO: 201 10E3/UL (ref 150–450)
POTASSIUM BLD-SCNC: 3.8 MMOL/L (ref 3.4–5.3)
RBC # BLD AUTO: 3.45 10E6/UL (ref 4.4–5.9)
SODIUM SERPL-SCNC: 137 MMOL/L (ref 133–144)
TACROLIMUS BLD-MCNC: 4.1 UG/L (ref 5–15)
TME LAST DOSE: ABNORMAL H
TME LAST DOSE: ABNORMAL H
WBC # BLD AUTO: 7.6 10E3/UL (ref 4–11)

## 2021-12-17 PROCEDURE — 250N000012 HC RX MED GY IP 250 OP 636 PS 637: Performed by: INTERNAL MEDICINE

## 2021-12-17 PROCEDURE — 36415 COLL VENOUS BLD VENIPUNCTURE: CPT | Performed by: NURSE PRACTITIONER

## 2021-12-17 PROCEDURE — 80197 ASSAY OF TACROLIMUS: CPT | Performed by: NURSE PRACTITIONER

## 2021-12-17 PROCEDURE — 250N000013 HC RX MED GY IP 250 OP 250 PS 637: Performed by: STUDENT IN AN ORGANIZED HEALTH CARE EDUCATION/TRAINING PROGRAM

## 2021-12-17 PROCEDURE — 82310 ASSAY OF CALCIUM: CPT | Performed by: NURSE PRACTITIONER

## 2021-12-17 PROCEDURE — 250N000011 HC RX IP 250 OP 636: Performed by: STUDENT IN AN ORGANIZED HEALTH CARE EDUCATION/TRAINING PROGRAM

## 2021-12-17 PROCEDURE — 99239 HOSP IP/OBS DSCHRG MGMT >30: CPT | Performed by: INTERNAL MEDICINE

## 2021-12-17 PROCEDURE — 85014 HEMATOCRIT: CPT | Performed by: NURSE PRACTITIONER

## 2021-12-17 PROCEDURE — 250N000012 HC RX MED GY IP 250 OP 636 PS 637: Performed by: NURSE PRACTITIONER

## 2021-12-17 PROCEDURE — 250N000013 HC RX MED GY IP 250 OP 250 PS 637: Performed by: NURSE PRACTITIONER

## 2021-12-17 RX ORDER — TACROLIMUS 1 MG/1
CAPSULE ORAL
Qty: 900 CAPSULE | Refills: 11 | COMMUNITY
Start: 2021-12-17 | End: 2022-02-05

## 2021-12-17 RX ORDER — DOCUSATE SODIUM 100 MG/1
100 CAPSULE, LIQUID FILLED ORAL 2 TIMES DAILY
Qty: 60 CAPSULE | Refills: 3 | Status: SHIPPED | OUTPATIENT
Start: 2021-12-17 | End: 2023-06-07

## 2021-12-17 RX ORDER — NALOXONE HYDROCHLORIDE 0.4 MG/ML
0.2 INJECTION, SOLUTION INTRAMUSCULAR; INTRAVENOUS; SUBCUTANEOUS
Status: DISCONTINUED | OUTPATIENT
Start: 2021-12-17 | End: 2021-12-17 | Stop reason: HOSPADM

## 2021-12-17 RX ORDER — DOXYCYCLINE 100 MG/1
100 CAPSULE ORAL 2 TIMES DAILY
Qty: 38 CAPSULE | Refills: 0 | Status: SHIPPED | OUTPATIENT
Start: 2021-12-17 | End: 2022-01-05

## 2021-12-17 RX ORDER — ROSUVASTATIN CALCIUM 10 MG/1
10 TABLET, COATED ORAL DAILY
Qty: 90 TABLET | Refills: 3 | Status: SHIPPED | OUTPATIENT
Start: 2021-12-18 | End: 2022-09-27

## 2021-12-17 RX ORDER — TRAMADOL HYDROCHLORIDE 50 MG/1
50-100 TABLET ORAL EVERY 6 HOURS PRN
Status: DISCONTINUED | OUTPATIENT
Start: 2021-12-17 | End: 2021-12-17 | Stop reason: HOSPADM

## 2021-12-17 RX ORDER — NALOXONE HYDROCHLORIDE 0.4 MG/ML
0.4 INJECTION, SOLUTION INTRAMUSCULAR; INTRAVENOUS; SUBCUTANEOUS
Status: DISCONTINUED | OUTPATIENT
Start: 2021-12-17 | End: 2021-12-17 | Stop reason: HOSPADM

## 2021-12-17 RX ORDER — ROSUVASTATIN CALCIUM 10 MG/1
10 TABLET, COATED ORAL DAILY
Status: DISCONTINUED | OUTPATIENT
Start: 2021-12-17 | End: 2021-12-17 | Stop reason: HOSPADM

## 2021-12-17 RX ORDER — ANAKINRA 100 MG/.67ML
100 INJECTION, SOLUTION SUBCUTANEOUS DAILY
Qty: 20.1 ML | Refills: 0 | COMMUNITY
Start: 2021-12-17 | End: 2022-01-10

## 2021-12-17 RX ADMIN — TACROLIMUS 4 MG: 1 CAPSULE ORAL at 08:03

## 2021-12-17 RX ADMIN — TRAMADOL HYDROCHLORIDE 100 MG: 50 TABLET, FILM COATED ORAL at 04:10

## 2021-12-17 RX ADMIN — PIPERACILLIN SODIUM,TAZOBACTAM SODIUM 4.5 G: 4; .5 INJECTION, POWDER, FOR SOLUTION INTRAVENOUS at 04:02

## 2021-12-17 RX ADMIN — MYCOPHENOLATE MOFETIL 500 MG: 250 CAPSULE ORAL at 08:02

## 2021-12-17 RX ADMIN — ACETAMINOPHEN 975 MG: 325 TABLET, FILM COATED ORAL at 08:02

## 2021-12-17 RX ADMIN — PIPERACILLIN SODIUM,TAZOBACTAM SODIUM 4.5 G: 4; .5 INJECTION, POWDER, FOR SOLUTION INTRAVENOUS at 09:24

## 2021-12-17 RX ADMIN — Medication 1 TABLET: at 08:03

## 2021-12-17 RX ADMIN — TRAMADOL HYDROCHLORIDE 100 MG: 50 TABLET, FILM COATED ORAL at 10:06

## 2021-12-17 RX ADMIN — ASPIRIN 81 MG CHEWABLE TABLET 81 MG: 81 TABLET CHEWABLE at 08:02

## 2021-12-17 RX ADMIN — DOCUSATE SODIUM 100 MG: 100 CAPSULE, LIQUID FILLED ORAL at 10:06

## 2021-12-17 RX ADMIN — BUPROPION HYDROCHLORIDE 75 MG: 75 TABLET, FILM COATED ORAL at 09:39

## 2021-12-17 RX ADMIN — PANTOPRAZOLE SODIUM 40 MG: 40 TABLET, DELAYED RELEASE ORAL at 08:03

## 2021-12-17 RX ADMIN — ROSUVASTATIN CALCIUM 10 MG: 10 TABLET, FILM COATED ORAL at 09:24

## 2021-12-17 RX ADMIN — ALLOPURINOL 300 MG: 300 TABLET ORAL at 08:02

## 2021-12-17 RX ADMIN — FERROUS SULFATE TAB 325 MG (65 MG ELEMENTAL FE) 325 MG: 325 (65 FE) TAB at 08:03

## 2021-12-17 ASSESSMENT — ACTIVITIES OF DAILY LIVING (ADL)
ADLS_ACUITY_SCORE: 13
ADLS_ACUITY_SCORE: 7
ADLS_ACUITY_SCORE: 13
ADLS_ACUITY_SCORE: 7

## 2021-12-17 ASSESSMENT — MIFFLIN-ST. JEOR: SCORE: 1531.08

## 2021-12-17 NOTE — PLAN OF CARE
Afebrile, vss. Tylenol given x 1 and tramadol x 1 for shoulder pain rating 8/10 with relief noted with the tramadol. Melatonin given at bedtime as sleep aid. Patient alert and oriented and can let his needs be known. Was actively participating in cares and assessments. Room air continues, patient tolerating. Rhythm sinus. Patient is warm and well perfused with palpable pulses throughout. PO intake adequate. Voiding using the bedside urinal. Patient reported bowel movement. PIV in place and patent. No overt skin issues noted. Updated on plan of care. Will continue to monitor and assess.

## 2021-12-17 NOTE — DISCHARGE INSTRUCTIONS
START doxycycline 100 mg twice a day, start around dinner time tonight  STOP taking ferous sulfate (iron) while you are taking doxycycline  We will help you schedule follow-up appt with Pulmonology  Please get labs on Saturday if able otherwise Monday    Call us if you have fever, chills, cough, shortness of breath, dizziness, lightheadedness, change in mental status

## 2021-12-17 NOTE — PLAN OF CARE
Pt without any complaints; any aphasia issues resolved. Neuros intact. Pt cleared to discharge to home today.  Received IV dose zosyn this morning but will change to oral abxs on discharge.  Plan to have labs drawn this sat if possible, otherwise Monday 12/20. Pt already has follow up clinic appt set.  Pt  independent with cares in room. Pt's wife to provide transport home when Rxs ready.
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
## 2021-12-17 NOTE — PROVIDER NOTIFICATION
Notified provider of elevated BP's, patient asymptomatic.     Plan was continue to monitor for any changes and notify care team if so.

## 2021-12-19 ENCOUNTER — DOCUMENTATION ONLY (OUTPATIENT)
Dept: TRANSPLANT | Facility: CLINIC | Age: 66
End: 2021-12-19
Payer: COMMERCIAL

## 2021-12-19 DIAGNOSIS — Z94.1 TRANSPLANTED HEART (H): Primary | ICD-10-CM

## 2021-12-19 NOTE — PROGRESS NOTES
Pt called to check in after being discharged from hospital. Pt states he is doing good with no complaints. Upcoming labs and appointments reviewed. 12 hour trough also reviewed. Pt verbalized understanding.

## 2021-12-20 ENCOUNTER — LAB (OUTPATIENT)
Dept: LAB | Facility: CLINIC | Age: 66
End: 2021-12-20
Payer: COMMERCIAL

## 2021-12-20 DIAGNOSIS — Z94.1 TRANSPLANTED HEART (H): ICD-10-CM

## 2021-12-20 DIAGNOSIS — Z94.1 S/P ORTHOTOPIC HEART TRANSPLANT (H): ICD-10-CM

## 2021-12-20 LAB
ANION GAP SERPL CALCULATED.3IONS-SCNC: 7 MMOL/L (ref 3–14)
BUN SERPL-MCNC: 28 MG/DL (ref 7–30)
CALCIUM SERPL-MCNC: 9.2 MG/DL (ref 8.5–10.1)
CHLORIDE BLD-SCNC: 106 MMOL/L (ref 94–109)
CO2 SERPL-SCNC: 24 MMOL/L (ref 20–32)
CREAT SERPL-MCNC: 1.7 MG/DL (ref 0.66–1.25)
ERYTHROCYTE [DISTWIDTH] IN BLOOD BY AUTOMATED COUNT: 13.2 % (ref 10–15)
GFR SERPL CREATININE-BSD FRML MDRD: 42 ML/MIN/1.73M2
GLUCOSE BLD-MCNC: 128 MG/DL (ref 70–99)
HCT VFR BLD AUTO: 35.9 % (ref 40–53)
HGB BLD-MCNC: 11.4 G/DL (ref 13.3–17.7)
MCH RBC QN AUTO: 29.8 PG (ref 26.5–33)
MCHC RBC AUTO-ENTMCNC: 31.8 G/DL (ref 31.5–36.5)
MCV RBC AUTO: 94 FL (ref 78–100)
PLATELET # BLD AUTO: 255 10E3/UL (ref 150–450)
POTASSIUM BLD-SCNC: 4.4 MMOL/L (ref 3.4–5.3)
RBC # BLD AUTO: 3.82 10E6/UL (ref 4.4–5.9)
SODIUM SERPL-SCNC: 137 MMOL/L (ref 133–144)
TACROLIMUS BLD-MCNC: 4.3 UG/L (ref 5–15)
TME LAST DOSE: ABNORMAL H
TME LAST DOSE: ABNORMAL H
WBC # BLD AUTO: 5.5 10E3/UL (ref 4–11)

## 2021-12-20 PROCEDURE — 85027 COMPLETE CBC AUTOMATED: CPT

## 2021-12-20 PROCEDURE — 80048 BASIC METABOLIC PNL TOTAL CA: CPT

## 2021-12-20 PROCEDURE — 80197 ASSAY OF TACROLIMUS: CPT

## 2021-12-20 PROCEDURE — 36415 COLL VENOUS BLD VENIPUNCTURE: CPT

## 2021-12-21 ENCOUNTER — DOCUMENTATION ONLY (OUTPATIENT)
Dept: TRANSPLANT | Facility: CLINIC | Age: 66
End: 2021-12-21
Payer: COMMERCIAL

## 2021-12-21 LAB
BACTERIA BLD CULT: NO GROWTH
BACTERIA BLD CULT: NO GROWTH

## 2021-12-21 RX ORDER — PANTOPRAZOLE SODIUM 40 MG/1
TABLET, DELAYED RELEASE ORAL
Qty: 60 TABLET | Refills: 1 | Status: SHIPPED | OUTPATIENT
Start: 2021-12-21 | End: 2022-03-16

## 2021-12-22 ENCOUNTER — TELEPHONE (OUTPATIENT)
Dept: PULMONOLOGY | Facility: CLINIC | Age: 66
End: 2021-12-22
Payer: COMMERCIAL

## 2021-12-22 NOTE — TELEPHONE ENCOUNTER
Attempted to LVM with direct call back to discuss scheduling a follow up appt in the next 2-3 weeks, per pt request, after receiving communication from call center that they were unable to find an appt in that time frame. There are openings in Jan and as pt was previously seen by Dr. Qureshi less than 3 years ago, pt is a return pt and just needs a new provider. Will send [employer] [facility] message instead.

## 2021-12-22 NOTE — RESULT ENCOUNTER NOTE
Tac level 4.3. Goal 5-7. Cr 1.7. Current dose 4 mg bid. Confirmed 12 hour trough. Instructions given to increase to 4.5 mg in am and keep 4 mg in pm. Repeat in 1 week.

## 2021-12-23 DIAGNOSIS — Z94.1 TRANSPLANTED HEART (H): Primary | ICD-10-CM

## 2021-12-23 RX ORDER — TACROLIMUS 0.5 MG/1
0.5 CAPSULE ORAL DAILY
Qty: 90 CAPSULE | Refills: 3 | Status: SHIPPED | OUTPATIENT
Start: 2021-12-23 | End: 2022-05-07

## 2021-12-27 ENCOUNTER — TELEPHONE (OUTPATIENT)
Dept: PULMONOLOGY | Facility: CLINIC | Age: 66
End: 2021-12-27
Payer: COMMERCIAL

## 2021-12-27 NOTE — TELEPHONE ENCOUNTER
Spoke with patient after he requested pulm appt in next 2-3 weeks; previously saw Dr. Qureshi who is no longer with our clinic, therefore scheduled for an opening in Jan, per his request, with Dr. Woods. Details confirmed with pt who preferred video visit.

## 2022-01-01 NOTE — PROGRESS NOTES
ANTICOAGULATION FOLLOW-UP CLINIC VISIT    Patient Name:  Jim Willingham  Date:  2020  Contact Type:  Telephone    SUBJECTIVE:  Patient Findings     Comments:   Patient started Amiodarone on 20.        Clinical Outcomes     Comments:   Patient started Amiodarone on 20.           OBJECTIVE    Recent labs: (last 7 days)     20  0747   INR 2.63*       ASSESSMENT / PLAN  No question data found.  Anticoagulation Summary  As of 2020    INR goal:   2.0-3.0   TTR:   61.4 % (11.7 mo)   INR used for dosin.63 (2020)   Warfarin maintenance plan:   10 mg (5 mg x 2) every Wed, Fri; 7.5 mg (5 mg x 1.5) all other days   Full warfarin instructions:   : 7.5 mg; : 7.5 mg; Otherwise 10 mg every Wed, Fri; 7.5 mg all other days   Weekly warfarin total:   57.5 mg   Plan last modified:   Maru Alonso RN (6/10/2020)   Next INR check:   2020   Priority:   Critical   Target end date:   Indefinite    Indications    LVAD (left ventricular assist device) present (H) [Z95.811]  Long-term (current) use of anticoagulants [Z79.01] [Z79.01]  Chronic systolic congestive heart failure (H) [I50.22]             Anticoagulation Episode Summary     INR check location:       Preferred lab:       Send INR reminders to:   [facility]    Comments:   HIPPA Forms mailed 17  Labs drawn either at [facility] or [facility], See 3/5/20 ADDENDUM, pt. weekly INR >2.0 for Cardioversion  LVAD placed 17   II  ASA 81 mg daily      Anticoagulation Care Providers     Provider Role Specialty Phone number    Delisa Montgomery MD Referring Cardiology [phone]            See the Encounter Report to view Anticoagulation Flowsheet and Dosing Calendar (Go to Encounters tab in chart review, and find the Anticoagulation Therapy Visit)    Spoke with patient.     Maru Alonso RN                 
see L&D notes

## 2022-01-03 ENCOUNTER — PRE VISIT (OUTPATIENT)
Dept: TRANSPLANT | Facility: CLINIC | Age: 67
End: 2022-01-03
Payer: COMMERCIAL

## 2022-01-03 DIAGNOSIS — Z94.1 TRANSPLANTED HEART (H): Primary | ICD-10-CM

## 2022-01-04 ENCOUNTER — TELEPHONE (OUTPATIENT)
Dept: TRANSPLANT | Facility: CLINIC | Age: 67
End: 2022-01-04
Payer: COMMERCIAL

## 2022-01-04 NOTE — TELEPHONE ENCOUNTER
Wife was exposed to son who has tested positive for covid. Wife has been vaccinated but is experiencing some symptoms of cough, chills, aches. [place] has been vaccinated x1. Due to wife having symptoms, appointments for today canceled.

## 2022-01-05 ENCOUNTER — APPOINTMENT (OUTPATIENT)
Dept: URGENT CARE | Facility: CLINIC | Age: 67
End: 2022-01-05
Payer: COMMERCIAL

## 2022-01-05 ENCOUNTER — TELEPHONE (OUTPATIENT)
Dept: TRANSPLANT | Facility: CLINIC | Age: 67
End: 2022-01-05
Payer: COMMERCIAL

## 2022-01-05 DIAGNOSIS — Z94.1 TRANSPLANTED HEART (H): Primary | ICD-10-CM

## 2022-01-05 NOTE — TELEPHONE ENCOUNTER
Call returned. Pt needing covid orders to get tested. His wife is positive. Minor symptoms as of now. Orders in. Pt verbalized understanding.   
pts wife positive for COVID today  He needs orders to go get tested   
ambulatory

## 2022-01-10 ENCOUNTER — TELEPHONE (OUTPATIENT)
Dept: RHEUMATOLOGY | Facility: CLINIC | Age: 67
End: 2022-01-10
Payer: COMMERCIAL

## 2022-01-10 DIAGNOSIS — M10.369 ACUTE GOUT DUE TO RENAL IMPAIRMENT INVOLVING KNEE, UNSPECIFIED LATERALITY: ICD-10-CM

## 2022-01-10 DIAGNOSIS — M1A.3790 CHRONIC GOUT DUE TO RENAL IMPAIRMENT INVOLVING FOOT WITHOUT TOPHUS, UNSPECIFIED LATERALITY: ICD-10-CM

## 2022-01-10 RX ORDER — ANAKINRA 100 MG/.67ML
100 INJECTION, SOLUTION SUBCUTANEOUS DAILY
Qty: 20.1 ML | Refills: 6 | Status: ON HOLD | OUTPATIENT
Start: 2022-01-10 | End: 2024-01-01

## 2022-01-10 NOTE — TELEPHONE ENCOUNTER
[facility] Call Center    Phone Message    May a detailed message be left on voicemail: no     Reason for Call: Medication Question or concern regarding medication   Prescription Clarification  Name of Medication: Anakinra (Kineret)   Prescribing Provider: Dr. Pettit   Pharmacy: [facility]   What on the order needs clarification? Needs PA completed. Pt will need this medication ASAP (they were in the process of shipping this out to him when they realized it would need a PA). Pharmacy would like to be notified as soon as this PA is completed so they can ship this out to him.       Action Taken: Message routed to:  [facility]    Travel Screening: Not Applicable

## 2022-01-10 NOTE — PLAN OF CARE
Problem: Goal/Outcome  Goal: Goal Outcome Summary  Outcome: Improving  FOCUS/GOAL  Pain management, Medical management and Mobility     ASSESSMENT, INTERVENTIONS AND CONTINUING PLAN FOR GOAL:  Pt's vitals, MAP and BGs within normal. LVAD function within parameters. No alarms this shift. Pt able to perform LVAD systems check and transfer from wall power to battery power himself with only staff supervision. Pt continent of bladder. Set up with urinal. No bm this shift. Pt needing only SBA-CGA with transfers using a walker. Prn ultram and prn dilaudid given per request to keep chest incision pain under good control.            Quality 130: Documentation Of Current Medications In The Medical Record: Current Medications Documented Detail Level: Detailed

## 2022-01-11 NOTE — TELEPHONE ENCOUNTER
PA on file valid through 8/2022 that states only 7 syringes can be dispensed at a time.  Contacted patient to convert medication to [facility]VSP.  He will call me back with Kineret ONtrack information.  Script released to SP.

## 2022-01-12 ENCOUNTER — VIRTUAL VISIT (OUTPATIENT)
Dept: SLEEP MEDICINE | Facility: CLINIC | Age: 67
End: 2022-01-12
Attending: INTERNAL MEDICINE
Payer: COMMERCIAL

## 2022-01-12 VITALS — WEIGHT: 171 LBS | BODY MASS INDEX: 25.91 KG/M2 | HEIGHT: 68 IN

## 2022-01-12 DIAGNOSIS — G47.33 OSA (OBSTRUCTIVE SLEEP APNEA): Primary | ICD-10-CM

## 2022-01-12 PROCEDURE — 99205 OFFICE O/P NEW HI 60 MIN: CPT | Mod: 95 | Performed by: INTERNAL MEDICINE

## 2022-01-12 ASSESSMENT — SLEEP AND FATIGUE QUESTIONNAIRES
HOW LIKELY ARE YOU TO NOD OFF OR FALL ASLEEP WHILE LYING DOWN TO REST IN THE AFTERNOON WHEN CIRCUMSTANCES PERMIT: HIGH CHANCE OF DOZING
HOW LIKELY ARE YOU TO NOD OFF OR FALL ASLEEP WHILE SITTING AND READING: MODERATE CHANCE OF DOZING
HOW LIKELY ARE YOU TO NOD OFF OR FALL ASLEEP WHILE SITTING AND TALKING TO SOMEONE: WOULD NEVER DOZE
HOW LIKELY ARE YOU TO NOD OFF OR FALL ASLEEP IN A CAR, WHILE STOPPED FOR A FEW MINUTES IN TRAFFIC: WOULD NEVER DOZE
HOW LIKELY ARE YOU TO NOD OFF OR FALL ASLEEP WHILE SITTING INACTIVE IN A PUBLIC PLACE: SLIGHT CHANCE OF DOZING
HOW LIKELY ARE YOU TO NOD OFF OR FALL ASLEEP WHILE WATCHING TV: MODERATE CHANCE OF DOZING
HOW LIKELY ARE YOU TO NOD OFF OR FALL ASLEEP WHEN YOU ARE A PASSENGER IN A CAR FOR AN HOUR WITHOUT A BREAK: SLIGHT CHANCE OF DOZING
HOW LIKELY ARE YOU TO NOD OFF OR FALL ASLEEP WHILE SITTING QUIETLY AFTER LUNCH WITHOUT ALCOHOL: MODERATE CHANCE OF DOZING

## 2022-01-12 ASSESSMENT — ENCOUNTER SYMPTOMS
MUSCLE WEAKNESS: 1
NECK PAIN: 1
HEADACHES: 1
JOINT SWELLING: 0
MUSCLE CRAMPS: 0
MEMORY LOSS: 1
POOR WOUND HEALING: 0
SPEECH CHANGE: 1
DEPRESSION: 0
PANIC: 0
PARALYSIS: 0
INSOMNIA: 1
SKIN CHANGES: 0
ARTHRALGIAS: 1
DISTURBANCES IN COORDINATION: 1
NAIL CHANGES: 0
TREMORS: 1
DIZZINESS: 1
TINGLING: 1
DECREASED CONCENTRATION: 1
BACK PAIN: 1
LOSS OF CONSCIOUSNESS: 0
NUMBNESS: 1
SEIZURES: 0
NERVOUS/ANXIOUS: 0
WEAKNESS: 1
MYALGIAS: 1
STIFFNESS: 1

## 2022-01-12 ASSESSMENT — MIFFLIN-ST. JEOR: SCORE: 1540.15

## 2022-01-12 NOTE — PROGRESS NOTES
"Jim is a 66 year old who is being evaluated via a billable video visit.      How would you like to obtain your AVS? MyChart  If the video visit is dropped, the invitation should be resent by: Text to cell phone: [phone]  Will anyone else be joining your video visit? No        Video-Visit Details    Type of service:  Video Visit    Video Start Time: Start: 01/12/2022 01:10 pm  Stop: 01/12/2022 01:53 pm    Originating Location (pt. Location): Home    Distant Location (provider location):  [facility] [place]     Platform used for Video Visit: [facility]  Sleep Consultation Note     Date on this visit: 1/12/2022    Jim Willingham is sent by Ricardo Gómez for a sleep consultation regarding reevaluation of previously diagnosed obstructive sleep apnea  Primary Physician: Ricardo Gómez     Chief complaint: \" It appears I may not have sleep apnea\"    Jim Willingham 66 year old with PMH CECILIA,  CKD Stage III, DM Type II,  HTN, COPD, and NICM status post heart transplant 5/6/2021.  He  presents to sleep clinic for a video visit for reevaluation of previously diagnosed obstructive sleep apnea    Sleep disordered breathing  He was diagnosed with obstructive sleep apnea in 1993, and has been using  CPAP since then.  He reports the pressure settings on his auto CPAP device ranged between 11-13 cms of water.  He underwent heart transplant surgery last year  His heaviest weight was 362 lbs; after he underwent gastric bypass his weight decreased to 230 lbs.  He subsequently developed CHF, underwent,LVAD implantation and gained  weight up to 260 pounds. He  lost weight for the transplant surgery to 235 lbs, since then has been losing weight and his current weight now 168 lbs.(he has never weighed this much since 1974).  He could not tolerate CPAP since he lost weight and discontinued  CPAP treatment since Thanksgiving 2021, and feels that he may not have sleep apnea " anymore.    There are no reports of snoring/apneas/gasping/choking/morning headaches since he discontinued that CPAP treatment.  He reports dry mouth - attributes to some of his medications.  He sleeps in all body positions except on his stomach.  He reports waking up feeling rested most mornings and has been noticing improvement in the overall energy levels particularly in the last 6 weeks and denies excessive daytime sleepiness.    Sleep Schedule/Sleep Complaints//Daytime Functioning   During weekdays, Jim goes to bed at 12MN  and wakes up at 550 AM. He  wakes up to get his great granddaughter ready for school.   Occasionally he may go back to bed after his great granddaughter leaves for school,  but most  times he is up for the rest of the today.  Sometimes active mind may affect his sleep. He has bilateral rotator cuff issues that can sometimes affect his sleep for which he uses Tylenol arthritis /tramadol  On weekends, He falls asleep at 12MN  and if he turns off alarm he can sleep till 9 AM.  He wakes up once  throughout the night  to use bathroom .   After awakening, He is able to fall back asleep right away  Reports feeling rested upon awakening from sleep.  He reports improvement in his energy levels  particularly  in the last 6 weeks.  He denies excessive daytime sleepiness.    Patient  denies drowsiness while driving.  Jim naps sometimes not often for  1-1.5 hrs and feels refreshed after naps.    SLEEP SCALES:  Patient's [place] Sleepiness score 11/24   Insomnia severity index:8    Restless Legs symptoms  Jim does not complain of restlessness feelings in the legs.    Sleep Behaviors  He denies any cataplexy/sleep hallucinations.  He denies  sleep walking, sleep eating.   Reports occasional sleep talking  He  reports acting out dreams that started about 2 years ago, where he has be physically active. Last episode occurred late Nov/early Dec 2021  Episodes have been less frequent.  His dreams  usually involve fighting with somebody/he is saving someone/he is engaged in athletic activity and has awakened waving hands in air, holding his fist as if he holding on someone's shirt.  He reported an episode when he fell off the bed more than 1 year ago while he was tackling somebody in his dream.  He reports anosmia.  He takes melatonin 10 mg for insomnia occasionally, but not on a nightly basis.  He does not know whether the episodes of dream enactment behavior, occurred on the nights when he did  not take melatonin.    Allergies:    Allergies   Allergen Reactions     Grass Shortness Of Breath     Ace Inhibitors Cough     Cats      Dust Mites Other (See Comments)     Asthma     Mold Other (See Comments)     Asthma     Penicillins Other (See Comments)     Unknown - childhood exposure    Tolerated Zosyn 9/18-9/20/2020    Per patient report he has tolerated amoxicillin     Sulfa Drugs Other (See Comments) and Unknown     Unknown childhood reaction       Medications:    Current Outpatient Medications   Medication Sig Dispense Refill     acetaminophen (TYLENOL) 325 MG tablet Take 3 tablets (975 mg) by mouth every 6 hours as needed for other (For optimal non-opioid multimodal pain management to improve pain control.) 100 tablet 1     albuterol (VENTOLIN HFA) 108 (90 Base) MCG/ACT inhaler INHALE 2 PUFFS BY MOUTH EVERY 4 HOURS AS NEEDED. MAX OF 12 PUFFS PER 24 HOURS 18 g 11     allopurinol (ZYLOPRIM) 300 MG tablet Take 1 tablet (300 mg) by mouth daily 90 tablet 1     anakinra (KINERET) 100 MG/0.67ML SOSY injection Inject 0.67 mLs (100 mg) Subcutaneous daily As directed by rheumatology 20.1 mL 6     aspirin (ASA) 81 MG chewable tablet Take 1 tablet (81 mg) by mouth daily 100 tablet 1     blood glucose (NO BRAND SPECIFIED) test strip Use to test blood sugar four times daily or as directed. 200 strip 3     blood glucose (ONE TOUCH DELICA) lancing device Lancing device to be used with lancets. 1 each 0     blood glucose  monitoring (ONE TOUCH ULTRA 2) meter device kit Use to test blood sugar four times daily or as directed. 1 kit 0     buPROPion (WELLBUTRIN) 75 MG tablet Take 1 tablet (75 mg) by mouth 2 times daily 180 tablet 1     calcium citrate-vitamin D (CITRACAL) 315-250 MG-UNIT TABS per tablet Take 1 tablet by mouth 2 times daily 180 tablet 3     Continuous Blood Gluc Transmit (DEXCOM G6 TRANSMITTER) MISC        docusate sodium (COLACE) 100 MG capsule Take 1 capsule (100 mg) by mouth 2 times daily 60 capsule 3     Fluticasone-Umeclidin-Vilanterol (TRELEGY ELLIPTA) 100-62.5-25 MCG/INH oral inhaler Inhale 1 puff into the lungs daily 1 each 11     insulin pen needle (32G X 4 MM) 32G X 4 MM miscellaneous Use four pen needles daily or as directed. 110 each 0     Melatonin 10 MG CAPS Take 1 capsule by mouth At Bedtime       montelukast (SINGULAIR) 10 MG tablet TAKE 1 TABLET(10 MG) BY MOUTH AT BEDTIME 90 tablet 1     mycophenolate (GENERIC EQUIVALENT) 250 MG capsule Take 2 capsules (500 mg) by mouth 2 times daily 120 capsule 3     pantoprazole (PROTONIX) 40 MG EC tablet TAKE 1 TABLET(40 MG) BY MOUTH TWICE DAILY 60 tablet 1     rosuvastatin (CRESTOR) 10 MG tablet Take 1 tablet (10 mg) by mouth daily 90 tablet 3     tacrolimus (GENERIC EQUIVALENT) 0.5 MG capsule Take 1 capsule (0.5 mg) by mouth daily W/ four (1 mg) capsules to equal 4.5 mg in morning. 90 capsule 3     tacrolimus (GENERIC EQUIVALENT) 1 MG capsule Take 4 capsules (4 mg) by mouth every morning AND 4 capsules (4 mg) every evening. 900 capsule 11     traMADol (ULTRAM) 50 MG tablet Take 1-2 tablets ( mg) by mouth every 6 hours as needed for moderate pain 60 tablet 1       Problem List:  Patient Active Problem List    Diagnosis Date Noted     Hx of aspergillosis 12/16/2021     Priority: Medium     Expressive aphasia 12/15/2021     Priority: Medium     Hospital-acquired pneumonia 12/15/2021     Priority: Medium     Encounter for monitoring tacrolimus therapy 12/11/2021      Priority: Medium     Pulmonary cavitary lesion 11/18/2021     Priority: Medium     Generalized muscle weakness 07/08/2021     Priority: Medium     Heart replaced by transplant (H) 06/28/2021     Priority: Medium     Added automatically from request for surgery [id number]       Abnormal CT scan of lung 05/25/2021     Priority: Medium     Need for prophylactic antibiotic 05/12/2021     Priority: Medium     S/P orthotopic heart transplant (H) 02/05/2021     Priority: Medium     Added automatically from request for surgery [id number]       Bilateral carpal tunnel syndrome 11/02/2020     Priority: Medium     Rotator cuff tear arthropathy of both shoulders 11/02/2020     Priority: Medium     COPD (chronic obstructive pulmonary disease) (H) 11/02/2020     Priority: Medium     Major depression, recurrent, chronic (H) 11/02/2020     Priority: Medium     Gouty arthropathy, chronic, without tophi 11/02/2020     Priority: Medium     Iron deficiency anemia 07/31/2018     Priority: Medium     Chronic systolic congestive heart failure (H) 07/10/2018     Priority: Medium     Physical deconditioning 06/30/2017     Priority: Medium     Type 2 diabetes mellitus with complication, with long-term current use of insulin (H) 05/11/2017     Priority: Medium     Stage 3b chronic kidney disease (H) 05/11/2017     Priority: Medium     H/O gastric bypass 05/11/2017     Priority: Medium     CECILIA (obstructive sleep apnea) 05/11/2017     Priority: Medium     Vitamin B12 deficiency (non anemic) 05/11/2017     Priority: Medium     NICM (nonischemic cardiomyopathy) (H)/ EF 20% 05/11/2017     Priority: Medium     ECHO: LVEDd. 7.66 cm, Restrictive pattern , Severe mitral valve regurgitation          Past Medical/Surgical History:  Past Medical History:   Diagnosis Date     Bariatric surgery status 2003     Benign essential hypertension 05/11/2017     Bilateral carpal tunnel syndrome 11/02/2020     Cardiomyopathy, unspecified (H) 05/08/2017     CKD  (chronic kidney disease) stage 3, GFR 30-59 ml/min (H) 05/11/2017     COPD (chronic obstructive pulmonary disease) (H) 11/02/2020     Depression 05/11/2017     Diabetes mellitus (H) 1995     Gouty arthropathy, chronic, without tophi 11/02/2020     H/O gastric bypass 05/11/2017     ICD (implantable cardioverter-defibrillator), biventricular, in situ 05/11/2017     LVAD (left ventricular assist device) present (H)      Major depression, recurrent, chronic (H) 11/02/2020     NICM (nonischemic cardiomyopathy) (H)/ EF 20% 05/11/2017    ECHO: LVEDd. 7.66 cm, Restrictive pattern , Severe mitral valve regurgitation     CECILIA (obstructive sleep apnea) 05/11/2017     Paroxysmal atrial fibrillation (H) 05/11/2017     Paroxysmal VT (H) 05/11/2017     Pulmonary cavitary lesion 11/18/2021     Rotator cuff tear arthropathy of both shoulders 11/02/2020     Uncomplicated asthma      Vitamin B12 deficiency (non anemic) 05/11/2017     Past Surgical History:   Procedure Laterality Date     ANESTHESIA CARDIOVERSION N/A 05/11/2020    Procedure: ANESTHESIA, FOR CARDIOVERSION @1100;  Surgeon: GENERIC ANESTHESIA PROVIDER;  Location: [facility] OR     BRONCHOSCOPY (RIGID OR FLEXIBLE), DIAGNOSTIC N/A 8/30/2021    Procedure: BRONCHOSCOPY, WITH BRONCHOALVEOLAR LAVAGE;  Surgeon: Perlman, David Morris, MD;  Location: [facility] GI     CV HEART BIOPSY N/A 5/13/2021    Procedure: Heart Biopsy;  Surgeon: Scout Robins MD;  Location:  HEART CARDIAC CATH LAB     CV HEART BIOPSY N/A 5/20/2021    Procedure: Heart Biopsy;  Surgeon: Jeffrey Gibson MD;  Location:  HEART CARDIAC CATH LAB     CV HEART BIOPSY N/A 5/27/2021    Procedure: Heart Biopsy;  Surgeon: Jac Dover MD;  [facility]     CV HEART BIOPSY N/A 6/7/2021    Procedure: CV HEART BIOPSY;  Surgeon: Jeffrey Gibson MD;  Location:  HEART CARDIAC CATH LAB     CV HEART BIOPSY N/A 6/21/2021    Procedure: CV HEART BIOPSY;  Surgeon:  Scout Robins MD;  [facility]     CV HEART BIOPSY N/A 7/5/2021    Procedure: CV HEART BIOPSY;  Surgeon: Jeffrey Gibson MD;  [facility]     CV HEART BIOPSY N/A 7/16/2021    Procedure: Heart Biopsy;  Surgeon: Amadeo Art MD;  [facility]     CV HEART BIOPSY N/A 8/5/2021    Procedure: Heart Biopsy;  Surgeon: Jeffrey Gibson MD;  [facility]     CV HEART BIOPSY N/A 8/31/2021    Procedure: Heart Cath Heart Biopsy;  Surgeon: Jeffrey Gibson MD;  [facility]     CV HEART BIOPSY N/A 9/21/2021    Procedure: CV HEART BIOPSY;  Surgeon: Jeffrey Gibson MD;  [facility]     CV HEART BIOPSY N/A 10/5/2021    Procedure: CV HEART BIOPSY;  Surgeon: Jeffrey Gibson MD;  [facility]     CV HEART BIOPSY N/A 11/4/2021    Procedure: CV HEART BIOPSY;  Surgeon: Nicola Seth MD;  Location:  HEART CARDIAC CATH LAB     CV RIGHT HEART CATH MEASUREMENTS RECORDED N/A 07/24/2019    Procedure: CV RIGHT HEART CATH;  Surgeon: Renu Sears MD;  Location:  HEART CARDIAC CATH LAB     CV RIGHT HEART CATH MEASUREMENTS RECORDED N/A 08/05/2020    Procedure: CV RIGHT HEART CATH;  Surgeon: Nicola Seth MD;  Location:  HEART CARDIAC CATH LAB     CV RIGHT HEART CATH MEASUREMENTS RECORDED N/A 01/07/2021    Procedure: CV RIGHT HEART CATH;  Surgeon: Jac Dover MD;  Location:  HEART CARDIAC CATH LAB     CV RIGHT HEART CATH MEASUREMENTS RECORDED N/A 02/23/2021    Procedure: Heart Cath Right Heart Cath;  Surgeon: Jeffrey Gibson MD;  Location:  HEART CARDIAC CATH LAB     CV RIGHT HEART CATH MEASUREMENTS RECORDED N/A 03/23/2021    Procedure: Heart Cath Right Heart Cath. request for 3/23;  Surgeon: Jeffrey Gibson MD;  Location:  HEART CARDIAC CATH  LAB     CV RIGHT HEART CATH MEASUREMENTS RECORDED N/A 5/13/2021    Procedure: Right Heart Cath;  Surgeon: Scout Robins MD;  Location:  HEART CARDIAC CATH LAB     CV RIGHT HEART CATH MEASUREMENTS RECORDED N/A 5/20/2021    Procedure: Right Heart Cath;  Surgeon: Jeffrey Gibson MD;  Location:  HEART CARDIAC CATH LAB     CV RIGHT HEART CATH MEASUREMENTS RECORDED N/A 5/27/2021    Procedure: Right Heart Cath;  Surgeon: Jac Dover MD;  Location:  HEART CARDIAC CATH LAB     CV RIGHT HEART CATH MEASUREMENTS RECORDED N/A 6/7/2021    Procedure: CV RIGHT HEART CATH;  Surgeon: Jeffrey Gibson MD;  Location:  HEART CARDIAC CATH LAB     CV RIGHT HEART CATH MEASUREMENTS RECORDED N/A 6/21/2021    Procedure: CV RIGHT HEART CATH;  Surgeon: Scout Robins MD;  Location:  HEART CARDIAC CATH LAB     CV RIGHT HEART CATH MEASUREMENTS RECORDED N/A 7/5/2021    Procedure: CV RIGHT HEART CATH;  Surgeon: Jeffrey Gibson MD;  Location:  HEART CARDIAC CATH LAB     CV RIGHT HEART CATH MEASUREMENTS RECORDED N/A 7/16/2021    Procedure: Right Heart Cath;  Surgeon: Amadeo Art MD;  Location:  HEART CARDIAC CATH LAB     CV RIGHT HEART CATH MEASUREMENTS RECORDED N/A 8/5/2021    Procedure: CV RIGHT HEART CATH;  Surgeon: Jeffrey Gibson MD;  Location:  HEART CARDIAC CATH LAB     CV RIGHT HEART CATH MEASUREMENTS RECORDED N/A 8/31/2021    Procedure: Heart Cath Right Heart Cath;  Surgeon: Jeffrey Gibson MD;  Location:  HEART CARDIAC CATH LAB     CV RIGHT HEART CATH MEASUREMENTS RECORDED N/A 9/21/2021    Procedure: CV RIGHT HEART CATH;  Surgeon: Jeffrey Gibson MD;  Location:  HEART CARDIAC CATH LAB     CV RIGHT HEART CATH MEASUREMENTS RECORDED N/A 10/5/2021    Procedure: CV RIGHT HEART CATH;  Surgeon: Jeffrey Gibson MD;  Location:  HEART CARDIAC CATH LAB     CV RIGHT HEART CATH MEASUREMENTS  RECORDED N/A 2021    Procedure: CV RIGHT HEART CATH;  Surgeon: Nicola Seth MD;  Location:  HEART CARDIAC CATH LAB     GI SURGERY      Sylvester en Y     INSERT VENTRICULAR ASSIST DEVICE LEFT (HEARTMATE II) N/A 2017    Procedure: INSERT VENTRICULAR ASSIST DEVICE LEFT (HEARTMATE II);  Median Sternotomy Heartmate II Left Ventricular Assist Device Insertion on Pump Oxygenator;  Surgeon: Ronnie Quigley MD;  Location[facility]     IR CHEST TUBE PLACEMENT NON-TUNNELED RIGHT  2021     ORTHOPEDIC SURGERY      right knee wired     PICC DOUBLE LUMEN PLACEMENT Right 2020    5FR PICC DL. Length-43cm (1cm out).     PICC INSERTION - DOUBLE LUMEN Right 2021    IK/BRACH     RELEASE CARPAL TUNNEL BILATERAL Bilateral 2021    Procedure: Bilateral carpal tunnel release;  Surgeon: Jermaine Brand MD;  Location: [facility] OR     TRANSPLANT HEART RECIPIENT N/A 2021    Procedure: Redo median sternotomy, lysis of adhesions, heart transplant recipient, on cardiopulmonary bypass, intraoperative transesophageal echocardiogram per anesthesia, Implantable Cardioverter Defibrillator (ICD) removal;  Surgeon: Ronnie Quigley MD;  Location: [facility] OR       Social History:  Social History     Socioeconomic History     Marital status:      Spouse name: Not on file     Number of children: Not on file     Years of education: Not on file     Highest education level: Not on file   Occupational History     Not on file   Tobacco Use     Smoking status: Former Smoker     Quit date:      Years since quittin.0     Smokeless tobacco: Never Used   Substance and Sexual Activity     Alcohol use: No     Drug use: No     Sexual activity: Yes     Partners: Female   Other Topics Concern     Parent/sibling w/ CABG, MI or angioplasty before 65F 55M? No   Social History Narrative     Not on file     Social Determinants of Health     Financial Resource Strain: Not on file   Food Insecurity: Not on file   Transportation Needs:  "Not on file   Physical Activity: Not on file   Stress: Not on file   Social Connections: Not on file   Intimate Partner Violence: Not on file   Housing Stability: Not on file       Family History:  Family History   Problem Relation Age of Onset     Cerebrovascular Disease Mother 64     Diabetes Mother      Hypertension Mother      Coronary Artery Disease Father      Diabetes Type 2  Father      Obesity Brother      Obesity Brother      Cerebrovascular Disease Daughter 40       Review of Systems:   Answers for HPI/ROS submitted by the patient on 1/12/2022  General Symptoms: No  Skin Symptoms: Yes  HENT Symptoms: No  EYE SYMPTOMS: No  HEART SYMPTOMS: No  LUNG SYMPTOMS: No  INTESTINAL SYMPTOMS: No  URINARY SYMPTOMS: No  REPRODUCTIVE SYMPTOMS: Yes  SKELETAL SYMPTOMS: Yes  BLOOD SYMPTOMS: No  NERVOUS SYSTEM SYMPTOMS: Yes  MENTAL HEALTH SYMPTOMS: Yes  Changes in hair: No  Changes in moles/birth marks: No  Itching: Yes  Rashes: No  Changes in nails: No  Acne: No  Change in facial hair: No  Warts: No  Non-healing sores: No  Scarring: No  Flaking of skin: Yes  Color changes of hands/feet in cold : No  Sun sensitivity: No  Skin thickening: No  Scrotal pain or swelling: No  Erectile dysfunction: No  Penile discharge: No  Genital ulcers: No  Reduced libido: No  Back pain: Yes  Muscle aches: Yes  Neck pain: Yes  Swollen joints: No  Joint pain: Yes  Bone pain: Yes  Muscle cramps: No  Muscle weakness: Yes  Joint stiffness: Yes  Bone fracture: No  Trouble with coordination: Yes  Dizziness or trouble with balance: Yes  Fainting or black-out spells: No  Memory loss: Yes  Headache: Yes  Seizures: No  Speech problems: Yes  Tingling: Yes  Tremor: Yes  Weakness: Yes  Difficulty walking: Yes  Paralysis: No  Numbness: Yes  Nervous or Anxious: No  Depression: No  Trouble sleeping: Yes  Trouble thinking or concentrating: Yes  Mood changes: No  Panic attacks: No        Physical Examination:  Vitals: Ht 1.727 m (5' 8\")   Wt 77.6 kg (171 lb)  "  BMI 26.00 kg/m    BMI= Body mass index is 26 kg/m .    Lockhart Total Score 1/12/2022   Total score - Lockhart 11        General: No apparent distress, appropriately groomed  Head: Normocephalic, atraumatic  Chest: No cough, no audible wheezing, able to talk in full sentences  Skin: no rash  Psych: coherent speech, normal rate and volume, able to articulate logical thoughts, able   to abstract reason, no tangential thoughts, no hallucinations   or delusions  His  affect is normal  Neuro:  Mental status: Alert and  Oriented X 3  Speech: normal      OTHER TESTS/LABS:   I have reviewed the labs and made my comment in the assessment and plan.    Impression/Plan:  Previously diagnosed obstructive sleep apnea: Patient discontinued CPAP treatment due to intolerance after losing significant weight.  He no longer reports symptoms suggestive of CECILIA. Recommend obtaining supervised polysomnogram to reevaluate for sleep-related breathing disorder. Polysomnography reviewed.    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  We discussed the association of RBD with neurodegenerative condition such as Parkinson's disease and dementia of Lewy body disease.  Recommended taking 1/2 tab of melatonin 10 mg strength about one hour before bed and continue to monitor for any future episodes of dream enactment behavior.  If there are recurrences, he was instructed to take an extra 3 mg of melatonin and get back to me if episodes  recur.  We discussed safety of the sleep environment to prevent trauma to self/partner.  After the polysomnogram is completed , will refer patient to my colleague Neurology/Sleep specilaist Dr. Martínez An to obtain screening for neurodegenerative disease.    Insufficient sleep: We discussed the consequences of insufficient sleep. Recommended to  "follow regular sleep schedule and aim at obtaining at least 7 to 8 hours per night and avoid sleep deprivation.    We discussed weight management with healthy diet, and exercise.    Patient was strongly advised to avoid driving, operating any heavy machinery or other hazardous situations while drowsy or sleepy.  Patient was counseled on the importance of driving while alert, to pull over if drowsy, or nap before getting into the vehicle if sleepy.      Plan is to communicate results of sleep study in 10-14 days via video visit per pt request .     CC: Ricardo Gómez    The above note was dictated using voice recognition software. Although reviewed after completion, some word and grammatical error may remain . Please contact the author for any clarifications.    \"I spent a total of 70 minutes  face to face with Jim Willingham during today's video visit. Most  of this time was spent counseling the patient and  coordinating care regarding  CECILIA, PSG, abnormal sleep-related behaviors, REM sleep behavior disorder, increasing sleep duration, weight management and chart review., including documentation and further activities as noted above.\"     MD ROBIN Dominguez [facility] [address] [address] [phone]  Dept: [phone]                      "

## 2022-01-12 NOTE — PATIENT INSTRUCTIONS
Your BMI is Body mass index is 26 kg/m .    What is BMI?  Body mass index (BMI) is one way to tell whether you are at a healthy weight, overweight, or obese. It measures your weight in relation to your height.  A BMI of 18.5 to 24.9 is in the healthy range. A person with a BMI of 25 to 29.9 is considered overweight, and someone with a BMI of 30 or greater is considered obese.  Another way to find out if you are at risk for health problems caused by overweight and obesity is to measure your waist. If you are a woman and your waist is more than 35 inches, or if you are a man and your waist is more than 40 inches, your risk of disease may be higher.  More than two-thirds of American adults are considered overweight or obese. Being overweight or obese increases the risk for further weight gain.  Excess weight may lead to heart disease and diabetes. Creating and following plans for healthy eating and physical activity may help you improve your health.    Methods for maintaining or losing weight.  Weight control is part of healthy lifestyle and includes exercise, emotional health, and healthy eating habits.  Careful eating habits lifelong is the mainstay of weight control.  Though there are significant health benefits from weight loss, long-term weight loss with diet alone may be very difficult to achieve- studies show long-term success with dietary management in less than 10% of people. Attaining a healthy weight may be especially difficult to achieve in those with severe obesity. In some cases, medications, devices and surgical management might be considered.    What can you do?  If you are overweight or obese and are interested in methods for weight loss, you should discuss this with your provider. In addition, we recommend that you review healthy life styles and methods for weight loss available through the National Institutes of Health patient information sites:  "  http://win.niddk.nih.gov/publications/index.htm            MY TREATMENT INFORMATION FOR SLEEP APNEA-  Jim Willingham    DOCTOR : Sheyla Fletcher MD    Am I having a sleep study at a sleep center?  --->Due to insurance clearance delays, you will be contacted within 2-4 weeks to schedule    Am I having a home sleep study?  --->Watch the video for the device you are using:    -/drop off device-   https://www.Storitz.com/watch?v=yGGFBdELGhk    -Disposable device sent out require phone/computer application-   https://www.Storitz.com/watch?v=BCce_vbiwxE      Frequently asked questions:  1. What is Obstructive Sleep Apnea (CECILIA)? CECILIA is the most common type of sleep apnea. Apnea means, \"without breath.\"  Apnea is most often caused by narrowing or collapse of the upper airway as muscles relax during sleep.   Almost everyone has occasional apneas. Most people with sleep apnea have had brief interruptions at night frequently for many years.  The severity of sleep apnea is related to how frequent and severe the events are.   2. What are the consequences of CECILIA? Symptoms include: feeling sleepy during the day, snoring loudly, gasping or stopping of breathing, trouble sleeping, and occasionally morning headaches or heartburn at night.  Sleepiness can be serious and even increase the risk of falling asleep while driving. Other health consequences may include development of high blood pressure and other cardiovascular disease in persons who are susceptible. Untreated CECILIA  can contribute to heart disease, stroke and diabetes.   3. What are the treatment options? In most situations, sleep apnea is a lifelong disease that must be managed with daily therapy. Medications are not effective for sleep apnea and surgery is generally not considered until other therapies have been tried. Your treatment is your choice . Continuous Positive Airway (CPAP) works right away and is the therapy that is effective in " nearly everyone. An oral device to hold your jaw forward is usually the next most reliable option. Other options include postioning devices (to keep you off your back), weight loss, and surgery including a tongue pacing device. There is more detail about some of these options below.  4. Are my sleep studies covered by insurance? Although we will request verification of coverage, we advise you also check in advance of the study to ensure there is coverage.    Important tips for those choosing CPAP and similar devices   Know your equipment:  CPAP is continuous positive airway pressure that prevents obstructive sleep apnea by keeping the throat from collapsing while you are sleeping. In most cases, the device is  smart  and can slowly self-adjusts if your throat collapses and keeps a record every day of how well you are treated-this information is available to you and your care team.  BPAP is bilevel positive airway pressure that keeps your throat open and also assists each breath with a pressure boost to maintain adequate breathing.  Special kinds of BPAP are used in patients who have inadequate breathing from lung or heart disease. In most cases, the device is  smart  and can slowly self-adjusts to assist breathing. Like CPAP, the device keeps a record of how well you are treated.  Your mask is your connection to the device. You get to choose what feels most comfortable and the staff will help to make sure if fits. Here: are some examples of the different masks that are available:       Key points to remember on your journey with sleep apnea:  1. Sleep study.  PAP devices often need to be adjusted during a sleep study to show that they are effective and adjusted right.  2. Good tips to remember: Try wearing just the mask during a quiet time during the day so your body adapts to wearing it. A humidifier is recommended for comfort in most cases to prevent drying of your nose and throat. Allergy medication from your  provider may help you if you are having nasal congestion.  3. Getting settled-in. It takes more than one night for most of us to get used to wearing a mask. Try wearing just the mask during a quiet time during the day so your body adapts to wearing it. A humidifier is recommended for comfort in most cases. Our team will work with you carefully on the first day and will be in contact within 4 days and again at 2 and 4 weeks for advice and remote device adjustments. Your therapy is evaluated by the device each day.   4. Use it every night. The more you are able to sleep naturally for 7-8 hours, the more likely you will have good sleep and to prevent health risks or symptoms from sleep apnea. Even if you use it 4 hours it helps. Occasionally all of us are unable to use a medical therapy, in sleep apnea, it is not dangerous to miss one night.   5. Communicate. Call our skilled team on the number provided on the first day if your visit for problems that make it difficult to wear the device. Over 2 out of 3 patients can learn to wear the device long-term with help from our team. Remember to call our team or your sleep providers if you are unable to wear the device as we may have other solutions for those who cannot adapt to mask CPAP therapy. It is recommended that you sleep your sleep provider within the first 3 months and yearly after that if you are not having problems.   6. Use it for your health. We encourage use of CPAP masks during daytime quiet periods to allow your face and brain to adapt to the sensation of CPAP so that it will be a more natural sensation to awaken to at night or during naps. This can be very useful during the first few weeks or months of adapting to CPAP though it does not help medically to wear CPAP during wakefulness and  should not be used as a strategy just to meet guidelines.  7. Take care of your equipment. Make sure you clean your mask and tubing using directions every day and that your  filter and mask are replaced as recommended or if they are not working.     BESIDES CPAP, WHAT OTHER THERAPIES ARE THERE?    Positioning Device  Positioning devices are generally used when sleep apnea is mild and only occurs on your back.This example shows a pillow that straps around the waist. It may be appropriate for those whose sleep study shows milder sleep apnea that occurs primarily when lying flat on one's back. Preliminary studies have shown benefit but effectiveness at home may need to be verified by a home sleep test. These devices are generally not covered by medical insurance.  Examples of devices that maintain sleeping on the back to prevent snoring and mild sleep apnea.    Belt type body positioner  http://Click[employer] KIDS.ImagineOptix/    Electronic reminder  http://nightshifttherapy.com/  http://www.[employer] [place].ImagineOptix.au/      Oral Appliance  What is oral appliance therapy?  An oral appliance device fits on your teeth at night like a retainer used after having braces. The device is made by a specialized dentist and requires several visits over 1-2 months before a manufactured device is made to fit your teeth and is adjusted to prevent your sleep apnea. Once an oral device is working properly, snoring should be improved. A home sleep test may be recommended at that time if to determine whether the sleep apnea is adequately treated.       Some things to remember:  -Oral devices are often, but not always, covered by your medical insurance. Be sure to check with your insurance provider.   -If you are referred for oral therapy, you will be given a list of specialized dentists to consider or you may choose to visit the Web site of the American Academy of Dental Sleep Medicine  -Oral devices are less likely to work if you have severe sleep apnea or are extremely overweight.     More detailed information  An oral appliance is a small acrylic device that fits over the upper and lower teeth  (similar to a retainer or a mouth guard).  This device slightly moves jaw forward, which moves the base of the tongue forward, opens the airway, improves breathing for effective treat snoring and obstructive sleep apnea in perhaps 7 out of 10 people .  The best working devices are custom-made by a dental device  after a mold is made of the teeth 1, 2, 3.  When is an oral appliance indicated?  Oral appliance therapy is recommended as a first-line treatment for patients with primary snoring, mild sleep apnea, and for patients with moderate sleep apnea who prefer appliance therapy to use of CPAP4, 5. Severity of sleep apnea is determined by sleep testing and is based on the number of respiratory events per hour of sleep.   How successful is oral appliance therapy?  The success rate of oral appliance therapy in patients with mild sleep apnea is 75-80% while in patients with moderate sleep apnea it is 50-70%. The chance of success in patients with severe sleep apnea is 40-50%. The research also shows that oral appliances have a beneficial effect on the cardiovascular health of CECILIA patients at the same magnitude as CPAP therapy7.  Oral appliances should be a second-line treatment in cases of severe sleep apnea, but if not completely successful then a combination therapy utilizing CPAP plus oral appliance therapy may be effective. Oral appliances tend to be effective in a broad range of patients although studies show that the patients who have the highest success are females, younger patients, those with milder disease, and less severe obesity. 3, 6.   Finding a dentist that practices dental sleep medicine  Specific training is available through the American Academy of Dental Sleep Medicine for dentists interested in working in the field of sleep. To find a dentist who is educated in the field of sleep and the use of oral appliances, near you, visit the Web site of the American Academy of Dental Sleep Medicine.    References  1. farooq Stuart al.  Objectively measured vs self-reported compliance during oral appliance therapy for sleep-disordered breathing. Chest 2013; 144(5): 0717-2009.  2. Lauri, et al. Objective measurement of compliance during oral appliance therapy for sleep-disordered breathing. Thorax 2013; 68(1): 91-96.  3. Ifeanyi et al. Mandibular advancement devices in 620 men and women with CECILIA and snoring: tolerability and predictors of treatment success. Chest 2004; 125: 8250-4078.  4. Compa et al. Oral appliances for snoring and CECILIA: a review. Sleep 2006; 29: 244-262.  5. Uvaldo et al. Oral appliance treatment for CECILIA: an update. J Clin Sleep Med 2014; 10(2): 215-227.  6. Wilfredo et al. Predictors of OSAH treatment outcome. J Dent Res 2007; 86: 2912-8290.      Weight Loss:    Weight loss is a long-term strategy that may improve sleep apnea in some patients.    Weight management is a personal decision and the decision should be based on your interest and the potential benefits.  If you are interested in exploring weight loss strategies, the following discussion covers the impact on weight loss on sleep apnea and the approaches that may be successful.    Being overweight does not necessarily mean you will have health consequences.  Those who have BMI over 35 or over 27 with existing medical conditions carries greater risk.   Weight loss decreases severity of sleep apnea in most people with obesity. For those with mild obesity who have developed snoring with weight gain, even 15-30 pound weight loss can improve and occasionally eliminate sleep apnea.  Structured and life-long dietary and health habits are necessary to lose weight and keep healthier weight levels.     Though there may be significant health benefits from weight loss, long-term weight loss is very difficult to achieve- studies show success with dietary management in less than 10% of people. In addition, substantial weight loss may require years of dietary control and  may be difficult if patients have severe obesity. In these cases, surgical management may be considered.  Finally, older individuals who have tolerated obesity without health complications may be less likely to benefit from weight loss strategies.        Your BMI is Body mass index is 26 kg/m .  Weight management is a personal decision.  If you are interested in exploring weight loss strategies, the following discussion covers the approaches that may be successful. Body mass index (BMI) is one way to tell whether you are at a healthy weight, overweight, or obese. It measures your weight in relation to your height.  A BMI of 18.5 to 24.9 is in the healthy range. A person with a BMI of 25 to 29.9 is considered overweight, and someone with a BMI of 30 or greater is considered obese. More than two-thirds of American adults are considered overweight or obese.  Being overweight or obese increases the risk for further weight gain. Excess weight may lead to heart disease and diabetes.  Creating and following plans for healthy eating and physical activity may help you improve your health.  Weight control is part of healthy lifestyle and includes exercise, emotional health, and healthy eating habits. Careful eating habits lifelong are the mainstay of weight control. Though there are significant health benefits from weight loss, long-term weight loss with diet alone may be very difficult to achieve- studies show long-term success with dietary management in less than 10% of people. Attaining a healthy weight may be especially difficult to achieve in those with severe obesity. In some cases, medications, devices and surgical management might be considered.  What can you do?  If you are overweight or obese and are interested in methods for weight loss, you should discuss this with your provider.     Consider reducing daily calorie intake by 500 calories.     Keep a food journal.     Avoiding skipping meals, consider cutting  portions instead.    Diet combined with exercise helps maintain muscle while optimizing fat loss. Strength training is particularly important for building and maintaining muscle mass. Exercise helps reduce stress, increase energy, and improves fitness. Increasing exercise without diet control, however, may not burn enough calories to loose weight.       Start walking three days a week 10-20 minutes at a time    Work towards walking thirty minutes five days a week     Eventually, increase the speed of your walking for 1-2 minutes at time    And look into health and wellness programs that may be available through your health insurance provider, employer, local community center, or [facility].    Surgery:    Surgery for obstructive sleep apnea is considered generally only when other therapies fail to work. Surgery may be discussed with you if you are having a difficult time tolerating CPAP and or when there is an abnormal structure that requires surgical correction.  Nose and throat surgeries often enlarge the airway to prevent collapse.  Most of these surgeries create pain for 1-2 weeks and up to half of the most common surgeries are not effective throughout life.  You should carefully discuss the benefits and drawbacks to surgery with your sleep provider and surgeon to determine if it is the best solution for you.   More information  Surgery for CECILIA is directed at areas that are responsible for narrowing or complete obstruction of the airway during sleep.  There are a wide range of procedures available to enlarge and/or stabilize the airway to prevent blockage of breathing in the three major areas where it can occur: the palate, tongue, and nasal regions.  Successful surgical treatment depends on the accurate identification of the factors responsible for obstructive sleep apnea in each person.  A personalized approach is required because there is no single treatment that works well for everyone.  Because of anatomic  variation, consultation with an examination by a sleep surgeon is a critical first step in determining what surgical options are best for each patient.  In some cases, examination during sedation may be recommended in order to guide the selection of procedures.  Patients will be counseled about risks and benefits as well as the typical recovery course after surgery. Surgery is typically not a cure for a person s CECILIA.  However, surgery will often significantly improve one s CECILIA severity (termed  success rate ).  Even in the absence of a cure, surgery will decrease the cardiovascular risk associated with OSA7; improve overall quality of life8 (sleepiness, functionality, sleep quality, etc).      Palate Procedures:  Patients with CECILIA often have narrowing of their airway in the region of their tonsils and uvula.  The goals of palate procedures are to widen the airway in this region as well as to help the tissues resist collapse.  Modern palate procedure techniques focus on tissue conservation and soft tissue rearrangement, rather than tissue removal.  Often the uvula is preserved in this procedure. Residual sleep apnea is common in patient after pharyngoplasty with an average reduction in sleep apnea events of 33%2.      Tongue Procedures:  ExamWhile patients are awake, the muscles that surround the throat are active and keep this region open for breathing. These muscles relax during sleep, allowing the tongue and other structures to collapse and block breathing.  There are several different tongue procedures available.  Selection of a tongue base procedure depends on characteristics seen on physical exam.  Generally, procedures are aimed at removing bulky tissues in this area or preventing the back of the tongue from falling back during sleep.  Success rates for tongue surgery range from 50-62%3.    Hypoglossal Nerve Stimulation:  Hypoglossal nerve stimulation has recently received approval from the [place] Food  and Drug Administration for the treatment of obstructive sleep apnea.  This is based on research showing that the system was safe and effective in treating sleep apnea6.  Results showed that the median AHI score decreased 68%, from 29.3 to 9.0. This therapy uses an implant system that senses breathing patterns and delivers mild stimulation to airway muscles, which keeps the airway open during sleep.  The system consists of three fully implanted components: a small generator (similar in size to a pacemaker), a breathing sensor, and a stimulation lead.  Using a small handheld remote, a patient turns the therapy on before bed and off upon awakening.    Candidates for this device must be greater than 22 years of age, have moderate to severe CECILIA (AHI between 20-65), BMI less than 32, have tried CPAP/oral appliance without success, and have appropriate upper airway anatomy (determined by a sleep endoscopy performed by Dr. Huff).    Hypoglossal Nerve Stimulation Pathway:    The sleep surgeon s office will work with the patient through the insurance prior-authorization process (including communications and appeals).    Nasal Procedures:  Nasal obstruction can interfere with nasal breathing during the day and night.  Studies have shown that relief of nasal obstruction can improve the ability of some patients to tolerate positive airway pressure therapy for obstructive sleep apnea1.  Treatment options include medications such as nasal saline, topical corticosteroid and antihistamine sprays, and oral medications such as antihistamines or decongestants. Non-surgical treatments can include external nasal dilators for selected patients. If these are not successful by themselves, surgery can improve the nasal airway either alone or in combination with these other options.      Combination Procedures:  Combination of surgical procedures and other treatments may be recommended, particularly if patients have more than one area of  narrowing or persistent positional disease.  The success rate of combination surgery ranges from 66-80%2,3.    References  1. Ganga ONEILL. The Role of the Nose in Snoring and Obstructive Sleep Apnoea: An Update.  Eur Arch Otorhinolaryngol. 2011; 268: 1365-73.  2.  Bety SM; Prerna JA; Gage JR; Pallanch JF; Clover MB; Marv SG; Petra MENDES. Surgical modifications of the upper airway for obstructive sleep apnea in adults: a systematic review and meta-analysis. SLEEP 2010;33(10):2946-1532. Stan WILLOUGHBY. Hypopharyngeal surgery in obstructive sleep apnea: an evidence-based medicine review.  Arch Otolaryngol Head Neck Surg. 2006 Feb;132(2):206-13.  3. Art YH1, Martha Y, Kings DIANE. The efficacy of anatomically based multilevel surgery for obstructive sleep apnea. Otolaryngol Head Neck Surg. 2003 Oct;129(4):327-35.  4. Stan WILLOUGHBY, Goldberg A. Hypopharyngeal Surgery in Obstructive Sleep Apnea: An Evidence-Based Medicine Review. Arch Otolaryngol Head Neck Surg. 2006 Feb;132(2):206-13.  5. China PJ et al. Upper-Airway Stimulation for Obstructive Sleep Apnea.  N Engl J Med. 2014 Jan 9;370(2):139-49.  6. Anabel Y et al. Increased Incidence of Cardiovascular Disease in Middle-aged Men with Obstructive Sleep Apnea. Am J Respir Crit Care Med; 2002 166: 159-165  7. Womack EM et al. Studying Life Effects and Effectiveness of Palatopharyngoplasty (SLEEP) study: Subjective Outcomes of Isolated Uvulopalatopharyngoplasty. Otolaryngol Head Neck Surg. 2011; 144: 623-631.        WHAT IF I ONLY HAVE SNORING?      Mandibular advancement devices, lateral sleep positioning, long-term weight loss and treatment of nasal allergies have been shown to improve snoring.    Exercising tongue muscles with a game (https://www.ncbi.nlm.nih.gov/pubmed/04346937) or stimulating the tongue during the day with a device (https://doi.org/10.3390/xai20198103) have improved snoring in some individuals.    Remember to Drive Safe... Drive Alive     Sleep health  profoundly affects your health, mood, and your safety.  Thirty three percent of the population (one in three of us) is not getting enough sleep and many have a sleep disorder. Not getting enough sleep or having an untreated / undertreated sleep condition may make us sleepy without even knowing it. In fact, our driving could be dramatically impaired due to our sleep health. As your provider, here are some things I would like you to know about driving:     Here are some warning signs for impairment and dangerous drowsy driving:              -Having been awake more than 16 hours               -Looking tired               -Eyelid drooping              -Head nodding (it could be too late at this point)              -Driving for more than 30 minutes     Some things you could do to make the driving safer if you are experiencing some drowsiness:              -Stop driving and rest              -Call for transportation              -Make sure your sleep disorder is adequately treated     Some things that have been shown NOT to work when experiencing drowsiness while driving:              -Turning on the radio              -Opening windows              -Eating any  distracting  /  entertaining  foods (e.g., sunflower seeds, candy, or any other)              -Talking on the phone      Your decision may not only impact your life, but also the life of others. Please, remember to drive safe for yourself and all of us.    Dream enactment behaviors  For the dream enactment behaviors, it was discussed that it could  be due to confusional arousal, medications, or REM sleep behavior disorder (RBD). RBD is a neurophysiological substrate for some neurodegenerative disorders like alpha synucleinopathies (Parkinsons disease, Dementia of lewy body and Multisystem Atrophy).    We recommend you to keep your bedroom environment safe:    no weapons in the bedroom    Keep some padding on the floor    keep sharp objects away from the bed and  remove any potentially dangerous objects from the bedroom                  placing barriers on the side of the bed     moving the bed away from the window      Recommended taking 1/2 tab of melatonin 10 mg strength about one hour before bed and continue to monitor for any future episodes of dream enactment behavior.  If there are recurrences, please  take an extra 3 mg of melatonin and get back to us if episodes recur.      Referral to Neurology/Sleep specialist Dr. Martínez An has been provided to obtain screening for neurodegenerative disease.    Insufficient sleep: We discussed the consequences of insufficient sleep. Recommended to follow regular sleep schedule and aim at obtaining at least 7 to 8 hours per night and avoid sleep deprivation.

## 2022-01-19 ENCOUNTER — MYC MEDICAL ADVICE (OUTPATIENT)
Dept: FAMILY MEDICINE | Facility: CLINIC | Age: 67
End: 2022-01-19
Payer: COMMERCIAL

## 2022-01-19 DIAGNOSIS — Z94.1 S/P ORTHOTOPIC HEART TRANSPLANT (H): ICD-10-CM

## 2022-01-19 RX ORDER — GABAPENTIN 300 MG/1
CAPSULE ORAL
Qty: 60 CAPSULE | Refills: 0 | Status: SHIPPED | OUTPATIENT
Start: 2022-01-19 | End: 2022-05-16

## 2022-01-19 NOTE — TELEPHONE ENCOUNTER
Patient requesting to restart gabapentin d/t increased neuropathy.     To provider to review & advise if refill appropriate.   Would you like visit to address this?    Zeynep Vicente RN, BSN  [facility]

## 2022-01-26 ENCOUNTER — VIRTUAL VISIT (OUTPATIENT)
Dept: PULMONOLOGY | Facility: CLINIC | Age: 67
End: 2022-01-26
Attending: INTERNAL MEDICINE
Payer: COMMERCIAL

## 2022-01-26 DIAGNOSIS — J43.2 CENTRILOBULAR EMPHYSEMA (H): Primary | ICD-10-CM

## 2022-01-26 PROCEDURE — 99214 OFFICE O/P EST MOD 30 MIN: CPT | Mod: 95 | Performed by: INTERNAL MEDICINE

## 2022-01-26 ASSESSMENT — ENCOUNTER SYMPTOMS
WHEEZING: 0
FEVER: 0
SPUTUM PRODUCTION: 0
HEMOPTYSIS: 0
COUGH: 0
SHORTNESS OF BREATH: 0
CHILLS: 0

## 2022-01-26 NOTE — PROGRESS NOTES
Jim is a 66 year old who is being evaluated via a billable telephone visit.      What phone number would you like to be contacted at? [phone] (home)     How would you like to obtain your AVS? Mary  Phone call duration: 11 minutes          Pulmonology Clinic Follow up Visit       Jim Willingham MRN# [id number]   YOB: 1955 Age: 66 year old     Date of Visit: 1/26/2022    Reason for Visit: Follow-up COPD          Assessment and Plan:     Jim Willingham is a 66 year old male with a history of CKD Stage III, DM Type II, CECILIA, HTN, COPD,  and NICM status post heart transplant 5/6/2021.     ## COPD, severe  Diagnosed in approximately 2014, but maintained on albuterol until approximately 2019 when he was started on Breo and Incruse.  This was then changed to Trelegy in 2020, however he was transiently without insurance and therefore unable to afford the Trelegy and noticed no change in his breathing when he stopped taking it.  Was hospitalized for possible pneumonia in December 2020, otherwise no exacerbations.  Currently uses Trelegy approximately 1 time per week.  Previously had significant difficulty with dyspnea, however it appears that this was largely due to his heart disease.  Since undergoing heart transplant on 5/6/2021 his respiratory status is dramatically improved.  He states he previously had difficulty even walking around his yard, but this winter he has been able to snow blow his entire driveway without dyspnea.  -Given his immunocompromise status and severity of his COPD I encouraged him to reach  restart his Trelegy now that he has insurance.  If Trelegy remains expensive he will contact us for an alternative therapy.      ## CECILIA  Symptoms much improved with weight loss.  He continues to follow with sleep clinic and has a repeat PSG ordered.      ## History of tobacco use  He was a light smoker (less than 1 pack/week) for several days.  Quit smoking in 1985.          Return to  "clinic: 6 months    I personally spent 37 minutes on the date of the encounter doing chart review, history and exam, documentation and further activities per the note.      José Woods M.D.  Pulmonary & Critical Care  Pager: Click Here to page          History of Present Illness / Interval History:     Jim Willingham is a 66 year old male with a history of CKD Stage III, DM Type II, CECILIA, HTN, COPD,  and NICM status post heart transplant 5/6/2021.    Last seen 8/31/20    Feels a COPD has \"really been under control\".  He feels that most of his respiratory complaints are actually secondary to his heart disease and his breathing and exercise tolerance has been significantly improved since undergoing heart transplant in May of last year.  He has no wheezing or coughing.  He previously had trouble just walking around his driveway, now he is able to snow blow his driveway without trouble.  With unusually strenuous exertion he does experience mild dyspnea, but recovers more quickly than previous.    Prior to his transplant he was transiently without health insurance and was unable to afford his Trelegy so has only been using it intermittently.  He still has his albuterol inhaler and nebulizer, but only uses his albuterol once a week and has not needed his nebulizer since his transplant surgery.    He has a previous diagnosis of CECILIA and was prescribed CPAP, though he feels this is significantly improved with weight loss.  He longer has daytime sleepiness or morning headaches.  He was recently reevaluated in the sleep clinic and has a repeat PSG pending.            Review of Systems:     Review of Systems   Constitutional: Negative for chills, fever and malaise/fatigue.   Respiratory: Negative for cough, hemoptysis, sputum production, shortness of breath and wheezing.             Physical Examination:     There were no vitals taken for this visit.    Physical Exam  Exam limited as this was a phone visit, but the " patient was not in any audible distress.  He is able to speak in full sentences without any evidence of dyspnea.          Data:     PFT: 1/21/21        The FVC, FEV1, and FEV1/FVC ratio are reduced.  The lung capacity is within normal limits.  The preliminary diffusion capacity is reduced, however when corrected for the patient's hemoglobin the diffusion capacity is within normal limits.    IMPRESSION:  Severe obstructive disease      CT chest 12/15/2021  1. Scattered bronchocentric and some peripheral groundglass and  consolidative opacities throughout the right lung, predominantly in  the upper lobe, concerning for infection.   2. Similar trace loculated right pleural effusion.  3. Anasarca.  4. The remainder of the exam is not significantly changed since  10/15/2021.    All studies listed here were independently reviewed and interpreted by me today.         Medications:     Current Outpatient Medications   Medication     acetaminophen (TYLENOL) 325 MG tablet     albuterol (VENTOLIN HFA) 108 (90 Base) MCG/ACT inhaler     allopurinol (ZYLOPRIM) 300 MG tablet     anakinra (KINERET) 100 MG/0.67ML SOSY injection     aspirin (ASA) 81 MG chewable tablet     blood glucose (NO BRAND SPECIFIED) test strip     blood glucose (ONE TOUCH DELICA) lancing device     blood glucose monitoring (ONE TOUCH ULTRA 2) meter device kit     buPROPion (WELLBUTRIN) 75 MG tablet     calcium citrate-vitamin D (CITRACAL) 315-250 MG-UNIT TABS per tablet     docusate sodium (COLACE) 100 MG capsule     Fluticasone-Umeclidin-Vilanterol (TRELEGY ELLIPTA) 100-62.5-25 MCG/INH oral inhaler     gabapentin (NEURONTIN) 300 MG capsule     insulin pen needle (32G X 4 MM) 32G X 4 MM miscellaneous     Melatonin 10 MG CAPS     montelukast (SINGULAIR) 10 MG tablet     mycophenolate (GENERIC EQUIVALENT) 250 MG capsule     pantoprazole (PROTONIX) 40 MG EC tablet     rosuvastatin (CRESTOR) 10 MG tablet     tacrolimus (GENERIC EQUIVALENT) 0.5 MG capsule      tacrolimus (GENERIC EQUIVALENT) 1 MG capsule     traMADol (ULTRAM) 50 MG tablet     Continuous Blood Gluc Transmit (DEXCOM G6 TRANSMITTER) MISC     No current facility-administered medications for this visit.             The above note was dictated using voice recognition software and may include typographical errors. Please contact the author for any clarifications.

## 2022-01-26 NOTE — LETTER
1/26/2022     RE: Jim Willingham  [address]  [place] [address]    Dear Colleague,    Thank you for referring your patient, Jim Willingham, to the [facility]G [facility]. Please see a copy of my visit note below.    Jim is a 66 year old who is being evaluated via a billable telephone visit.      What phone number would you like to be contacted at? [phone] (home)     How would you like to obtain your AVS? Leyou softwarehart  Phone call duration: 11 minutes          Pulmonology Clinic Follow up Visit       Jim Willingham MRN# [id number]   YOB: 1955 Age: 66 year old     Date of Visit: 1/26/2022    Reason for Visit: Follow-up COPD          Assessment and Plan:     Jim Willingham is a 66 year old male with a history of CKD Stage III, DM Type II, CECILIA, HTN, COPD,  and NICM status post heart transplant 5/6/2021.     ## COPD, severe  Diagnosed in approximately 2014, but maintained on albuterol until approximately 2019 when he was started on Breo and Incruse.  This was then changed to Trelegy in 2020, however he was transiently without insurance and therefore unable to afford the Trelegy and noticed no change in his breathing when he stopped taking it.  Was hospitalized for possible pneumonia in December 2020, otherwise no exacerbations.  Currently uses Trelegy approximately 1 time per week.  Previously had significant difficulty with dyspnea, however it appears that this was largely due to his heart disease.  Since undergoing heart transplant on 5/6/2021 his respiratory status is dramatically improved.  He states he previously had difficulty even walking around his yard, but this winter he has been able to snow blow his entire driveway without dyspnea.  -Given his immunocompromise status and severity of his COPD I encouraged him to reach  restart his Trelegy now that he has insurance.  If Trelegy remains expensive he will contact us for an alternative  "therapy.      ## CECILIA  Symptoms much improved with weight loss.  He continues to follow with sleep clinic and has a repeat PSG ordered.      ## History of tobacco use  He was a light smoker (less than 1 pack/week) for several days.  Quit smoking in 1985.          Return to clinic: 6 months    I personally spent 37 minutes on the date of the encounter doing chart review, history and exam, documentation and further activities per the note.      José Woods M.D.  Pulmonary & Critical Care  Pager: Click Here to page          History of Present Illness / Interval History:     Jim Willingham is a 66 year old male with a history of CKD Stage III, DM Type II, CECILIA, HTN, COPD,  and NICM status post heart transplant 5/6/2021.    Last seen 8/31/20    Feels a COPD has \"really been under control\".  He feels that most of his respiratory complaints are actually secondary to his heart disease and his breathing and exercise tolerance has been significantly improved since undergoing heart transplant in May of last year.  He has no wheezing or coughing.  He previously had trouble just walking around his driveway, now he is able to snow blow his driveway without trouble.  With unusually strenuous exertion he does experience mild dyspnea, but recovers more quickly than previous.    Prior to his transplant he was transiently without health insurance and was unable to afford his Trelegy so has only been using it intermittently.  He still has his albuterol inhaler and nebulizer, but only uses his albuterol once a week and has not needed his nebulizer since his transplant surgery.    He has a previous diagnosis of CECILIA and was prescribed CPAP, though he feels this is significantly improved with weight loss.  He longer has daytime sleepiness or morning headaches.  He was recently reevaluated in the sleep clinic and has a repeat PSG pending.            Review of Systems:     Review of Systems   Constitutional: Negative for chills, fever " and malaise/fatigue.   Respiratory: Negative for cough, hemoptysis, sputum production, shortness of breath and wheezing.             Physical Examination:     There were no vitals taken for this visit.    Physical Exam  Exam limited as this was a phone visit, but the patient was not in any audible distress.  He is able to speak in full sentences without any evidence of dyspnea.          Data:     PFT: 1/21/21      The FVC, FEV1, and FEV1/FVC ratio are reduced.  The lung capacity is within normal limits.  The preliminary diffusion capacity is reduced, however when corrected for the patient's hemoglobin the diffusion capacity is within normal limits.      IMPRESSION:  Severe obstructive disease    CT chest 12/15/2021  1. Scattered bronchocentric and some peripheral groundglass and  consolidative opacities throughout the right lung, predominantly in  the upper lobe, concerning for infection.   2. Similar trace loculated right pleural effusion.  3. Anasarca.  4. The remainder of the exam is not significantly changed since  10/15/2021.    All studies listed here were independently reviewed and interpreted by me today.         Medications:     Current Outpatient Medications   Medication     acetaminophen (TYLENOL) 325 MG tablet     albuterol (VENTOLIN HFA) 108 (90 Base) MCG/ACT inhaler     allopurinol (ZYLOPRIM) 300 MG tablet     anakinra (KINERET) 100 MG/0.67ML SOSY injection     aspirin (ASA) 81 MG chewable tablet     blood glucose (NO BRAND SPECIFIED) test strip     blood glucose (ONE TOUCH DELICA) lancing device     blood glucose monitoring (ONE TOUCH ULTRA 2) meter device kit     buPROPion (WELLBUTRIN) 75 MG tablet     calcium citrate-vitamin D (CITRACAL) 315-250 MG-UNIT TABS per tablet     docusate sodium (COLACE) 100 MG capsule     Fluticasone-Umeclidin-Vilanterol (TRELEGY ELLIPTA) 100-62.5-25 MCG/INH oral inhaler     gabapentin (NEURONTIN) 300 MG capsule     insulin pen needle (32G X 4 MM) 32G X 4 MM miscellaneous      Melatonin 10 MG CAPS     montelukast (SINGULAIR) 10 MG tablet     mycophenolate (GENERIC EQUIVALENT) 250 MG capsule     pantoprazole (PROTONIX) 40 MG EC tablet     rosuvastatin (CRESTOR) 10 MG tablet     tacrolimus (GENERIC EQUIVALENT) 0.5 MG capsule     tacrolimus (GENERIC EQUIVALENT) 1 MG capsule     traMADol (ULTRAM) 50 MG tablet     Continuous Blood Gluc Transmit (DEXCOM G6 TRANSMITTER) MISC     No current facility-administered medications for this visit.     The above note was dictated using voice recognition software and may include typographical errors. Please contact the author for any clarifications.    Again, thank you for allowing me to participate in the care of your patient.      Sincerely,  José Woods MD

## 2022-01-31 ENCOUNTER — TELEPHONE (OUTPATIENT)
Dept: TRANSPLANT | Facility: CLINIC | Age: 67
End: 2022-01-31

## 2022-01-31 NOTE — TELEPHONE ENCOUNTER
Pt called to check in. He says he doing great. He thinks he's gained a little weight. He is currently weighting 185 lbs but is cooking more and thinks he is stronger. He's been cooking all the meals in their home, doing house work, and took grand daughter tubing. PMD started gabapentin for neuropathy. Since 8 month appointments were canceled in January. We rescheduled an echo and appt for 2/16. Echo will be completed 2/15 in  due to unavailability. Pt agrees with plan.

## 2022-02-04 ENCOUNTER — PRE VISIT (OUTPATIENT)
Dept: CARDIOLOGY | Facility: CLINIC | Age: 67
End: 2022-02-04
Payer: COMMERCIAL

## 2022-02-04 DIAGNOSIS — Z94.1 TRANSPLANTED HEART (H): Primary | ICD-10-CM

## 2022-02-05 ENCOUNTER — TELEPHONE (OUTPATIENT)
Dept: TRANSPLANT | Facility: CLINIC | Age: 67
End: 2022-02-05
Payer: COMMERCIAL

## 2022-02-05 DIAGNOSIS — Z94.1 S/P ORTHOTOPIC HEART TRANSPLANT (H): ICD-10-CM

## 2022-02-05 RX ORDER — TACROLIMUS 1 MG/1
CAPSULE ORAL
Qty: 900 CAPSULE | Refills: 11 | Status: SHIPPED | OUTPATIENT
Start: 2022-02-05 | End: 2022-05-07

## 2022-02-05 NOTE — TELEPHONE ENCOUNTER
Patient called to report he was out of tacrolimus. Rx increased since hospitalization in December to current dose of 4 mg/4.5mg. Patient noted his local pharmacy still has on record old dosage, patient was unable to  prescription.     New Rx for 4mg AM/ 4mg PM sent to local pharmacy as patient noted he had 0.5 tablets.     Message sent to transplant coordinator.

## 2022-02-07 ENCOUNTER — IMMUNIZATION (OUTPATIENT)
Dept: NURSING | Facility: CLINIC | Age: 67
End: 2022-02-07
Attending: INTERNAL MEDICINE
Payer: COMMERCIAL

## 2022-02-07 PROCEDURE — 0052A COVID-19,PF,PFIZER (12+ YRS): CPT

## 2022-02-07 PROCEDURE — 91305 COVID-19,PF,PFIZER (12+ YRS): CPT

## 2022-02-15 ENCOUNTER — ANCILLARY PROCEDURE (OUTPATIENT)
Dept: CARDIOLOGY | Facility: CLINIC | Age: 67
End: 2022-02-15
Attending: INTERNAL MEDICINE
Payer: COMMERCIAL

## 2022-02-15 PROCEDURE — 93306 TTE W/DOPPLER COMPLETE: CPT | Performed by: INTERNAL MEDICINE

## 2022-02-16 ENCOUNTER — OFFICE VISIT (OUTPATIENT)
Dept: CARDIOLOGY | Facility: CLINIC | Age: 67
End: 2022-02-16
Attending: INTERNAL MEDICINE
Payer: COMMERCIAL

## 2022-02-16 ENCOUNTER — LAB (OUTPATIENT)
Dept: LAB | Facility: CLINIC | Age: 67
End: 2022-02-16
Payer: COMMERCIAL

## 2022-02-16 VITALS
OXYGEN SATURATION: 100 % | HEART RATE: 104 BPM | BODY MASS INDEX: 29.33 KG/M2 | SYSTOLIC BLOOD PRESSURE: 143 MMHG | HEIGHT: 69 IN | DIASTOLIC BLOOD PRESSURE: 85 MMHG | WEIGHT: 198 LBS

## 2022-02-16 DIAGNOSIS — Z94.1 TRANSPLANTED HEART (H): ICD-10-CM

## 2022-02-16 DIAGNOSIS — Z94.1 TRANSPLANTED HEART (H): Primary | ICD-10-CM

## 2022-02-16 LAB
ANION GAP SERPL CALCULATED.3IONS-SCNC: 9 MMOL/L (ref 3–14)
BUN SERPL-MCNC: 40 MG/DL (ref 7–30)
CALCIUM SERPL-MCNC: 8.8 MG/DL (ref 8.5–10.1)
CHLORIDE BLD-SCNC: 107 MMOL/L (ref 94–109)
CK SERPL-CCNC: 195 U/L (ref 30–300)
CMV DNA SPEC NAA+PROBE-ACNC: NOT DETECTED IU/ML
CO2 SERPL-SCNC: 21 MMOL/L (ref 20–32)
CREAT SERPL-MCNC: 2.02 MG/DL (ref 0.66–1.25)
ERYTHROCYTE [DISTWIDTH] IN BLOOD BY AUTOMATED COUNT: 12.3 % (ref 10–15)
GFR SERPL CREATININE-BSD FRML MDRD: 36 ML/MIN/1.73M2
GLUCOSE BLD-MCNC: 89 MG/DL (ref 70–99)
HCT VFR BLD AUTO: 35.3 % (ref 40–53)
HGB BLD-MCNC: 11 G/DL (ref 13.3–17.7)
MAGNESIUM SERPL-MCNC: 2 MG/DL (ref 1.6–2.3)
MCH RBC QN AUTO: 30.2 PG (ref 26.5–33)
MCHC RBC AUTO-ENTMCNC: 31.2 G/DL (ref 31.5–36.5)
MCV RBC AUTO: 97 FL (ref 78–100)
PHOSPHATE SERPL-MCNC: 4.4 MG/DL (ref 2.5–4.5)
PLATELET # BLD AUTO: 211 10E3/UL (ref 150–450)
POTASSIUM BLD-SCNC: 4.1 MMOL/L (ref 3.4–5.3)
RBC # BLD AUTO: 3.64 10E6/UL (ref 4.4–5.9)
SODIUM SERPL-SCNC: 137 MMOL/L (ref 133–144)
TACROLIMUS BLD-MCNC: 4.4 UG/L (ref 5–15)
TME LAST DOSE: ABNORMAL H
TME LAST DOSE: ABNORMAL H
WBC # BLD AUTO: 4.6 10E3/UL (ref 4–11)

## 2022-02-16 PROCEDURE — 83735 ASSAY OF MAGNESIUM: CPT | Performed by: PATHOLOGY

## 2022-02-16 PROCEDURE — 99215 OFFICE O/P EST HI 40 MIN: CPT | Performed by: NURSE PRACTITIONER

## 2022-02-16 PROCEDURE — 82550 ASSAY OF CK (CPK): CPT | Performed by: PATHOLOGY

## 2022-02-16 PROCEDURE — 80048 BASIC METABOLIC PNL TOTAL CA: CPT | Performed by: PATHOLOGY

## 2022-02-16 PROCEDURE — 85027 COMPLETE CBC AUTOMATED: CPT | Performed by: PATHOLOGY

## 2022-02-16 PROCEDURE — 87799 DETECT AGENT NOS DNA QUANT: CPT | Mod: 90 | Performed by: PATHOLOGY

## 2022-02-16 PROCEDURE — 99000 SPECIMEN HANDLING OFFICE-LAB: CPT | Performed by: PATHOLOGY

## 2022-02-16 PROCEDURE — G0463 HOSPITAL OUTPT CLINIC VISIT: HCPCS

## 2022-02-16 PROCEDURE — 80197 ASSAY OF TACROLIMUS: CPT | Mod: 90 | Performed by: PATHOLOGY

## 2022-02-16 PROCEDURE — 84100 ASSAY OF PHOSPHORUS: CPT | Performed by: PATHOLOGY

## 2022-02-16 PROCEDURE — 36415 COLL VENOUS BLD VENIPUNCTURE: CPT | Performed by: PATHOLOGY

## 2022-02-16 ASSESSMENT — PAIN SCALES - GENERAL: PAINLEVEL: NO PAIN (0)
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
## 2022-02-16 NOTE — NURSING NOTE
Chief Complaint   Patient presents with     Follow Up     present for consult for LVAD pt with new a-fib     Vitals were taken and medications were reconciled.   CAMRYN Montalvo  10:06 AM

## 2022-02-16 NOTE — PATIENT INSTRUCTIONS
Patient Instructions  1. 6 months from 3rd dose for your 4th vaccine of covid.   2. Once we have your tac level back we will discuss when to repeat labs.   3. Dermatology referral made. They should reach out to schedule. If not let me know. Also, make sure you see a dentist regularly.    4. Keep up the good work!     Next transplant clinic appointment:  10 month on 3/16  Next lab draw: TBD  Coordinator will call with all pending results.     Please call transplant coordinator with any questions:    Yolette Rueda RN BSN   Post Heart Transplant Nurse Coordinator  [facility]  Questions: [phone]
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
## 2022-02-16 NOTE — NURSING NOTE
Transplant Coordinator Note    Reason for visit: 9th month visit. Missed 8 month due to covid exposure.   Coordinator: Present         Health concerns addressed today:  1. 192 lbs, no fluid weight.   2. Less foggy.   3. Riding bike at home.   4. Home BP's are WNL.   5. Elevated cr 2. Restarted gabapentin and tylenol (that won't hurt kidneys).   6. Discuss with Dr. Montgomery about switching to evero.       Immunosuppressants:  Tacro goal 4-6. Current dose 4.5/4. Last immuknow 263      bid     No DSAS  Baseline angiogram? Deferred for now.   Immuknow 323 8/5     Routine screenings:    Derm: Due, referral submitted.   Dental: Due  Colonoscopy: Last in 8/20016. Due 8/2021  Prostate: 0.35 Today's pending.   Eye: up to date.   Flu/Pneumonia: in process of getting.   Covid: 2/3, 3/11 for 3rd.     Echo and resulted labs reviewed with patient  Medication record reviewed and reconciled  Questions and concerns addressed  Pt verbalized an understanding of plan of care.     Patient Instructions  1. 6 months from 3rd dose for your 4th vaccine of covid.   2. Once we have your tac level back we will discuss when to repeat labs.   3. Dermatology referral made. They should reach out to schedule. If not let me know. Also, make sure you see a dentist regularly.    4. Keep up the good work!     Next transplant clinic appointment:  10 month on 3/16  Next lab draw: TBD  Coordinator will call with all pending results.     Please call transplant coordinator with any questions:    Yolette Rueda RN BSN   Post Heart Transplant Nurse Coordinator  [facility]  Questions: [phone]

## 2022-02-17 LAB
EBV DNA COPIES/ML, INSTRUMENT: 1994 COPIES/ML
EBV DNA SPEC NAA+PROBE-LOG#: 3.3 {LOG_COPIES}/ML

## 2022-02-20 DIAGNOSIS — Z94.1 S/P ORTHOTOPIC HEART TRANSPLANT (H): ICD-10-CM

## 2022-02-21 RX ORDER — MYCOPHENOLATE MOFETIL 250 MG/1
CAPSULE ORAL
Qty: 360 CAPSULE | Refills: 3 | Status: SHIPPED | OUTPATIENT
Start: 2022-02-21 | End: 2022-02-25

## 2022-02-25 DIAGNOSIS — Z94.1 S/P ORTHOTOPIC HEART TRANSPLANT (H): ICD-10-CM

## 2022-02-25 RX ORDER — MYCOPHENOLATE MOFETIL 250 MG/1
CAPSULE ORAL
Qty: 360 CAPSULE | Refills: 3 | Status: SHIPPED | OUTPATIENT
Start: 2022-02-25 | End: 2023-04-04

## 2022-03-11 ENCOUNTER — IMMUNIZATION (OUTPATIENT)
Dept: NURSING | Facility: CLINIC | Age: 67
End: 2022-03-11
Payer: COMMERCIAL

## 2022-03-11 DIAGNOSIS — Z94.1 TRANSPLANTED HEART (H): Primary | ICD-10-CM

## 2022-03-11 PROCEDURE — 0053A COVID-19,PF,PFIZER (12+ YRS): CPT

## 2022-03-11 PROCEDURE — 91305 COVID-19,PF,PFIZER (12+ YRS): CPT

## 2022-03-11 NOTE — NURSING NOTE
Transplant Coordinator Note     Reason for visit: 10  Month follow up.    Coordinator: Present          Health concerns addressed today:  1. Discuss with Dr. Montgomery about switching to evero?? Not at this time. Continue to monitor.    2. First ritesh 5/17 and 5/18  3. Can you reschedule sleep appt on 5/17?  4. Talk to Dr. CEE about angio?? Ok to do w/ cr =2. biplane  5. Started cardiac rehab last week.   6. Joint pain- only takes tylenol. Last Dexa 2020. Trigger fingers.   7. Weight is up, but good weight. No fluid retention.   8. Home BP's are good.   9. Ebv trending down.      Immunosuppressants:  Tacro goal 4-6. Current dose 4.5/4. Last immuknow 263      bid     No DSAS  Baseline angiogram? Deferred for now.   Immuknow 323 8/5     Routine screenings:    Derm: Due, referral submitted. Pt needs to schedule.   Dental: Due  Colonoscopy: Last in 8/20016. Due 8/2021  Prostate: 0.35   Eye: up to date.   Flu/Pneumonia: in process of getting.   Covid: 2/3, 3/11 (3rd).       Echo and resulted labs reviewed with patient  Medication record reviewed and reconciled  Questions and concerns addressed  Pt verbalized an understanding of plan of care.      Patient Instructions  1. Discuss switching sleep appt on 5/17?  2. I'm going to get you in to nephrology to keep an eye on your kidneys in next 6 months.   3. I will let you know results from today.   4. We will help get you in with Rheumatology  5. Stop Protonix   6. Please schedule with Derm and see a dentist.      Next transplant clinic appointment:  1st annual w/ biplane angiogram, rhc/hbx, cardiac MRI.   Next lab draw: TBD  Coordinator will call with all pending results.      Please call transplant coordinator with any questions:     Yolette Rueda RN BSN   Post Heart Transplant Nurse Coordinator  [facility]  Questions: [phone]

## 2022-03-15 LAB — SCANNED LAB RESULT: NORMAL

## 2022-03-16 ENCOUNTER — OFFICE VISIT (OUTPATIENT)
Dept: CARDIOLOGY | Facility: CLINIC | Age: 67
End: 2022-03-16
Attending: INTERNAL MEDICINE
Payer: COMMERCIAL

## 2022-03-16 ENCOUNTER — LAB (OUTPATIENT)
Dept: LAB | Facility: CLINIC | Age: 67
End: 2022-03-16
Payer: COMMERCIAL

## 2022-03-16 ENCOUNTER — ANCILLARY PROCEDURE (OUTPATIENT)
Dept: CARDIOLOGY | Facility: CLINIC | Age: 67
End: 2022-03-16
Attending: NURSE PRACTITIONER
Payer: COMMERCIAL

## 2022-03-16 VITALS
OXYGEN SATURATION: 99 % | BODY MASS INDEX: 29.77 KG/M2 | HEIGHT: 69 IN | HEART RATE: 108 BPM | WEIGHT: 201 LBS | DIASTOLIC BLOOD PRESSURE: 78 MMHG | SYSTOLIC BLOOD PRESSURE: 126 MMHG

## 2022-03-16 DIAGNOSIS — Z94.1 TRANSPLANTED HEART (H): Primary | ICD-10-CM

## 2022-03-16 DIAGNOSIS — Z94.1 HEART REPLACED BY TRANSPLANT (H): ICD-10-CM

## 2022-03-16 DIAGNOSIS — Z94.1 TRANSPLANTED HEART (H): ICD-10-CM

## 2022-03-16 LAB
ALBUMIN SERPL-MCNC: 3.7 G/DL (ref 3.4–5)
ALP SERPL-CCNC: 115 U/L (ref 40–150)
ALT SERPL W P-5'-P-CCNC: 31 U/L (ref 0–70)
ANION GAP SERPL CALCULATED.3IONS-SCNC: 8 MMOL/L (ref 3–14)
AST SERPL W P-5'-P-CCNC: 25 U/L (ref 0–45)
BASOPHILS # BLD AUTO: 0.1 10E3/UL (ref 0–0.2)
BASOPHILS NFR BLD AUTO: 1 %
BILIRUB SERPL-MCNC: 0.4 MG/DL (ref 0.2–1.3)
BUN SERPL-MCNC: 48 MG/DL (ref 7–30)
CALCIUM SERPL-MCNC: 8.6 MG/DL (ref 8.5–10.1)
CHLORIDE BLD-SCNC: 108 MMOL/L (ref 94–109)
CK SERPL-CCNC: 141 U/L (ref 30–300)
CMV DNA SPEC NAA+PROBE-ACNC: NOT DETECTED IU/ML
CO2 SERPL-SCNC: 23 MMOL/L (ref 20–32)
CREAT SERPL-MCNC: 2 MG/DL (ref 0.66–1.25)
EOSINOPHIL # BLD AUTO: 0.2 10E3/UL (ref 0–0.7)
EOSINOPHIL NFR BLD AUTO: 5 %
ERYTHROCYTE [DISTWIDTH] IN BLOOD BY AUTOMATED COUNT: 11.9 % (ref 10–15)
GFR SERPL CREATININE-BSD FRML MDRD: 37 ML/MIN/1.73M2
GLUCOSE BLD-MCNC: 101 MG/DL (ref 70–99)
HCT VFR BLD AUTO: 36.9 % (ref 40–53)
HGB BLD-MCNC: 11.4 G/DL (ref 13.3–17.7)
IMM GRANULOCYTES # BLD: 0 10E3/UL
IMM GRANULOCYTES NFR BLD: 0 %
LYMPHOCYTES # BLD AUTO: 1.5 10E3/UL (ref 0.8–5.3)
LYMPHOCYTES NFR BLD AUTO: 30 %
MAGNESIUM SERPL-MCNC: 2 MG/DL (ref 1.6–2.3)
MCH RBC QN AUTO: 29.6 PG (ref 26.5–33)
MCHC RBC AUTO-ENTMCNC: 30.9 G/DL (ref 31.5–36.5)
MCV RBC AUTO: 96 FL (ref 78–100)
MONOCYTES # BLD AUTO: 0.7 10E3/UL (ref 0–1.3)
MONOCYTES NFR BLD AUTO: 15 %
NEUTROPHILS # BLD AUTO: 2.4 10E3/UL (ref 1.6–8.3)
NEUTROPHILS NFR BLD AUTO: 49 %
NRBC # BLD AUTO: 0 10E3/UL
NRBC BLD AUTO-RTO: 0 /100
PHOSPHATE SERPL-MCNC: 4.6 MG/DL (ref 2.5–4.5)
PLATELET # BLD AUTO: 231 10E3/UL (ref 150–450)
POTASSIUM BLD-SCNC: 4.2 MMOL/L (ref 3.4–5.3)
PROT SERPL-MCNC: 6.7 G/DL (ref 6.8–8.8)
RBC # BLD AUTO: 3.85 10E6/UL (ref 4.4–5.9)
SODIUM SERPL-SCNC: 139 MMOL/L (ref 133–144)
TACROLIMUS BLD-MCNC: 4.1 UG/L (ref 5–15)
TME LAST DOSE: ABNORMAL H
TME LAST DOSE: ABNORMAL H
WBC # BLD AUTO: 4.9 10E3/UL (ref 4–11)

## 2022-03-16 PROCEDURE — 36415 COLL VENOUS BLD VENIPUNCTURE: CPT | Performed by: PATHOLOGY

## 2022-03-16 PROCEDURE — 99000 SPECIMEN HANDLING OFFICE-LAB: CPT | Performed by: PATHOLOGY

## 2022-03-16 PROCEDURE — 85025 COMPLETE CBC W/AUTO DIFF WBC: CPT | Performed by: PATHOLOGY

## 2022-03-16 PROCEDURE — 99214 OFFICE O/P EST MOD 30 MIN: CPT | Mod: 25 | Performed by: INTERNAL MEDICINE

## 2022-03-16 PROCEDURE — G0463 HOSPITAL OUTPT CLINIC VISIT: HCPCS

## 2022-03-16 PROCEDURE — 84100 ASSAY OF PHOSPHORUS: CPT | Performed by: PATHOLOGY

## 2022-03-16 PROCEDURE — 80197 ASSAY OF TACROLIMUS: CPT | Mod: 90 | Performed by: PATHOLOGY

## 2022-03-16 PROCEDURE — 87799 DETECT AGENT NOS DNA QUANT: CPT | Mod: 90 | Performed by: PATHOLOGY

## 2022-03-16 PROCEDURE — 82550 ASSAY OF CK (CPK): CPT | Performed by: PATHOLOGY

## 2022-03-16 PROCEDURE — 93306 TTE W/DOPPLER COMPLETE: CPT | Performed by: INTERNAL MEDICINE

## 2022-03-16 PROCEDURE — 83735 ASSAY OF MAGNESIUM: CPT | Performed by: PATHOLOGY

## 2022-03-16 PROCEDURE — 80053 COMPREHEN METABOLIC PANEL: CPT | Performed by: PATHOLOGY

## 2022-03-16 ASSESSMENT — PAIN SCALES - GENERAL: PAINLEVEL: NO PAIN (0)
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
## 2022-03-16 NOTE — NURSING NOTE
Chief Complaint   Patient presents with     Follow Up     Return Heart Transplant   Vitals were taken and medications reconciled.    Andrew Davis, EMT  8:56 AM
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
## 2022-03-16 NOTE — PATIENT INSTRUCTIONS
Patient Instructions  1. Discuss switching sleep appt on 5/17?  2. I'm going to get you in to nephrology to keep an eye on your kidneys in next 6 months.   3. I will let you know results from today.   4. We will help get you in with Rheumatology  5. Stop Protonix   6. Please schedule with Derm and see a dentist.      Next transplant clinic appointment:  1st annual w/ biplane angiogram, rhc/hbx, cardiac MRI.   Next lab draw: TBD  Coordinator will call with all pending results.      Please call transplant coordinator with any questions:     Yolette Rueda RN BSN   Post Heart Transplant Nurse Coordinator  [facility]  Questions: [phone]

## 2022-03-17 LAB
EBV DNA COPIES/ML, INSTRUMENT: 1387 COPIES/ML
EBV DNA SPEC NAA+PROBE-LOG#: 3.1 {LOG_COPIES}/ML

## 2022-03-18 ENCOUNTER — TELEPHONE (OUTPATIENT)
Dept: FAMILY MEDICINE | Facility: CLINIC | Age: 67
End: 2022-03-18

## 2022-03-18 ENCOUNTER — OFFICE VISIT (OUTPATIENT)
Dept: FAMILY MEDICINE | Facility: CLINIC | Age: 67
End: 2022-03-18
Payer: COMMERCIAL

## 2022-03-18 VITALS
RESPIRATION RATE: 18 BRPM | OXYGEN SATURATION: 99 % | HEART RATE: 105 BPM | HEIGHT: 68 IN | WEIGHT: 203.7 LBS | TEMPERATURE: 98.1 F | DIASTOLIC BLOOD PRESSURE: 69 MMHG | BODY MASS INDEX: 30.87 KG/M2 | SYSTOLIC BLOOD PRESSURE: 125 MMHG

## 2022-03-18 DIAGNOSIS — M12.812 ROTATOR CUFF TEAR ARTHROPATHY OF BOTH SHOULDERS: Primary | ICD-10-CM

## 2022-03-18 DIAGNOSIS — M75.101 ROTATOR CUFF TEAR ARTHROPATHY OF BOTH SHOULDERS: Primary | ICD-10-CM

## 2022-03-18 DIAGNOSIS — B27.00 EPSTEIN-BARR VIRUS VIREMIA: ICD-10-CM

## 2022-03-18 DIAGNOSIS — R20.0 NUMBNESS AND TINGLING IN RIGHT HAND: ICD-10-CM

## 2022-03-18 DIAGNOSIS — M75.102 ROTATOR CUFF TEAR ARTHROPATHY OF BOTH SHOULDERS: Primary | ICD-10-CM

## 2022-03-18 DIAGNOSIS — Z86.19 HX OF ASPERGILLOSIS: ICD-10-CM

## 2022-03-18 DIAGNOSIS — M12.811 ROTATOR CUFF TEAR ARTHROPATHY OF BOTH SHOULDERS: Primary | ICD-10-CM

## 2022-03-18 DIAGNOSIS — J44.9 CHRONIC OBSTRUCTIVE PULMONARY DISEASE, UNSPECIFIED COPD TYPE (H): ICD-10-CM

## 2022-03-18 DIAGNOSIS — G47.33 OSA (OBSTRUCTIVE SLEEP APNEA): ICD-10-CM

## 2022-03-18 DIAGNOSIS — D64.9 MILD ANEMIA: ICD-10-CM

## 2022-03-18 DIAGNOSIS — Z94.1 S/P ORTHOTOPIC HEART TRANSPLANT (H): ICD-10-CM

## 2022-03-18 DIAGNOSIS — F33.9 MAJOR DEPRESSION, RECURRENT, CHRONIC (H): ICD-10-CM

## 2022-03-18 DIAGNOSIS — N18.32 STAGE 3B CHRONIC KIDNEY DISEASE (H): ICD-10-CM

## 2022-03-18 DIAGNOSIS — M1A.00X0 GOUTY ARTHROPATHY, CHRONIC, WITHOUT TOPHI: ICD-10-CM

## 2022-03-18 DIAGNOSIS — R20.2 NUMBNESS AND TINGLING IN RIGHT HAND: ICD-10-CM

## 2022-03-18 DIAGNOSIS — E11.42 TYPE 2 DIABETES MELLITUS WITH DIABETIC POLYNEUROPATHY, WITHOUT LONG-TERM CURRENT USE OF INSULIN (H): ICD-10-CM

## 2022-03-18 LAB
ALLOMAP SCORE (EXTERNAL): 33 (ref 0–40)
NEGATIVE PREDICTIVE VALUE PERCENT (EXTERNAL): 99.1 %
POSITIVE PREDICTIVE VALUE PERCENT (EXTERNAL): 3.8 %

## 2022-03-18 PROCEDURE — 99214 OFFICE O/P EST MOD 30 MIN: CPT | Performed by: INTERNAL MEDICINE

## 2022-03-18 RX ORDER — TRAMADOL HYDROCHLORIDE 50 MG/1
TABLET ORAL
Qty: 120 TABLET | Refills: 3 | Status: SHIPPED | OUTPATIENT
Start: 2022-03-18 | End: 2022-09-27

## 2022-03-18 ASSESSMENT — PATIENT HEALTH QUESTIONNAIRE - PHQ9
SUM OF ALL RESPONSES TO PHQ QUESTIONS 1-9: 4
SUM OF ALL RESPONSES TO PHQ QUESTIONS 1-9: 4
10. IF YOU CHECKED OFF ANY PROBLEMS, HOW DIFFICULT HAVE THESE PROBLEMS MADE IT FOR YOU TO DO YOUR WORK, TAKE CARE OF THINGS AT HOME, OR GET ALONG WITH OTHER PEOPLE: SOMEWHAT DIFFICULT

## 2022-03-18 ASSESSMENT — ANXIETY QUESTIONNAIRES
5. BEING SO RESTLESS THAT IT IS HARD TO SIT STILL: NOT AT ALL
2. NOT BEING ABLE TO STOP OR CONTROL WORRYING: SEVERAL DAYS
GAD7 TOTAL SCORE: 3
1. FEELING NERVOUS, ANXIOUS, OR ON EDGE: NOT AT ALL
7. FEELING AFRAID AS IF SOMETHING AWFUL MIGHT HAPPEN: NOT AT ALL
3. WORRYING TOO MUCH ABOUT DIFFERENT THINGS: SEVERAL DAYS
6. BECOMING EASILY ANNOYED OR IRRITABLE: SEVERAL DAYS
7. FEELING AFRAID AS IF SOMETHING AWFUL MIGHT HAPPEN: NOT AT ALL
4. TROUBLE RELAXING: NOT AT ALL
GAD7 TOTAL SCORE: 3
GAD7 TOTAL SCORE: 3

## 2022-03-18 ASSESSMENT — PAIN SCALES - GENERAL: PAINLEVEL: MODERATE PAIN (4)

## 2022-03-18 NOTE — TELEPHONE ENCOUNTER
Please submit prior authorization for tramadol.  Patient cannot be on nonsteroidal anti-inflammatory medication because of heart transplant and chronic kidney disease.

## 2022-03-18 NOTE — PROGRESS NOTES
Assessment & Plan     1.  Rotator cuff tear arthropathy of both shoulders with chronic pain.  Agreed to increase tramadol dose.  He will take 50 mg Tylenol in the morning and afternoon.  100 mg at bedtime.  Inform patient that I would not be willing to increase the dose any further.  2.  History of orthotopic heart transplant for cardiomyopathy on 5/6/2021.  Currently on mycophenolate and tacrolimus.  Followed by transplant team.  Patient starting cardiac rehab in 2 weeks.  He is able to now walk at least a block at a time without dyspnea.  3.  Low-grade Leigh-Barr virus viremia.  4.  Numbness and tingling right hand.  Had bilateral carpal tunnel release surgery on 2/18/2021.  Also has depleting contracture right hand.  Advised to wear wrist splint at night since his symptoms are more so at night or early morning.  5.  Diabetic polyneuropathy related to previous type 2 diabetes.  Currently on gabapentin 600 mg twice a day.  He does have numbness and some pain in both feet.  At this point I recommend staying with the current dose of gabapentin and do not recommend any increase.  6.  Stage IIIb chronic kidney disease remaining stable.  Creatinine 2.0 GFR 37 measured on 3/16/2022 phosphorus was borderline at 4.6.  Had WALTER post transplant and renal function did not return to baseline.  7.  History of aspergillosis and pulmonary cavitation.  Patient completed treatment with variconazole few months ago.  8.  Gouty arthropathy chronic without tophi.  On allopurinol 3 mg a day.  Last uric acid level was good at 4.0 on 11/11/2021.  Patient has upcoming rheumatology appointment.  9.  Major depression recurrent chronic clinically in remission with bupropion 75 mg twice a day.  Continue same.  10.  COPD-stable.  Patient on Trelegy, albuterol as needed.  11.  Previous history obstructive sleep apnea which clinically seems to resolve with weight loss.  Patient is good to have reassessment with sleep study on April 29.  12.   Past history of type 2 diabetes mellitus which with weight loss done after heart transplant has resolved.  Blood sugars are in normal range in the last A1c was 5.4 on 12/3/2021.  13.  Mild anemia with hemoglobin 11.4 on 3/16/2022.  Most likely related to anemia of chronic kidney disease.        Return in about 6 months (around 9/18/2022) for Routine Visit.    Ricardo Gómez MD  [facility] KOLTON Fernandez is a 66 year old who presents for the following health issues     History of Present Illness     Asthma:  He presents for follow up of asthma.  He has no cough, no wheezing, and some shortness of breath. He is using a relief medication a few times a week. He does not miss any doses of his controller medication throughout the week.Patient is aware of the following triggers: none. The patient has had a visit to the Emergency Room, Urgent Care or Hospital due to asthma since the last clinic visit. He has been to the Emergency Room or Urgent Care 1 time.He has had a Hospitalization 0 times.    Back Pain:  He presents for follow up of back pain. Patient's back pain is a chronic problem.  Location of back pain:  Right lower back, left lower back, right shoulder and left shoulder  Description of back pain: dull ache, sharp and shooting  Back pain spreads: nowhere    Since patient first noticed back pain, pain is: always present, but gets better and worse  Does back pain interfere with his job:  Not applicable      COPD:  He presents for follow up of COPD.  Overall, COPD symptoms are stable since last visit. He has same as usual fatigue or shortness of breath with exertion and no shortness of breath at rest.He sometimes coughs and does not have change in sputum. No recent fever. He can walk less than 1 block without stopping to rest. He can walk 1 flights of stairs without resting.The patient has had no ED, urgent care, or hospital admissions because of COPD since the last visit.     Mental  Health Follow-up:                    Today's PHQ-9         PHQ-9 Total Score: 4  PHQ-9 Q9 Thoughts of better off dead/self-harm past 2 weeks :   (P) Not at all    How difficult have these problems made it for you to do your work, take care of things at home, or get along with other people: Somewhat difficult    Today's BRIAN-7 Score: 3    Diabetes:   He presents for follow up of diabetes.  He is not checking blood glucose. He has no concerns regarding his diabetes at this time.  He is having numbness in feet.         He eats 2-3 servings of fruits and vegetables daily.He consumes 0 sweetened beverage(s) daily.He exercises with enough effort to increase his heart rate 9 or less minutes per day.  He exercises with enough effort to increase his heart rate 3 or less days per week.   He is taking medications regularly.     66-year-old gentleman with orthotopic heart transplant on 5/6/2021, chronic kidney disease stage III, gout, osteoarthritis and other health problems listed in the problem list.  He states he has been doing really well overall except for the severe joint pain particular shoulders.         Review of Systems   Constitutional, HEENT, cardiovascular, pulmonary, GI, , musculoskeletal, neuro, skin, endocrine and psych systems are negative, except as otherwise noted.      Objective    There were no vitals taken for this visit.  There is no height or weight on file to calculate BMI.  Physical Exam   GENERAL: healthy, alert and no distress  NECK: no adenopathy, no asymmetry, masses, or scars and thyroid normal to palpation  RESP: lungs clear to auscultation - no rales, rhonchi or wheezes  CV: regular rate and rhythm, normal S1 S2, no S3 or S4, no murmur, click or rub, no peripheral edema and peripheral pulses strong  ABDOMEN: soft, nontender, no hepatosplenomegaly, no masses and bowel sounds normal  MS: Bilateral mild tenderness of the shoulders.  No effusion or redness.  Range of motion bilaterally restricted  with abduction less than 90 degrees both shoulders.  Some hypertrophy of the small joints of the hand and knees noted consistent with osteoarthritis.  Examination of the right hand reveals deepening contracture involving the middle finger.  Tinel sign is negative.  Neurological: Touch perception reduced in the feet.

## 2022-03-18 NOTE — TELEPHONE ENCOUNTER
Pharmacy contacted clinic via fax  traMADol (ULTRAM) 50 MG tablet  Not covered by patient insurance. Alternative available?  Prior authorization if no alternative    Patient ID [id number]  Plan [phone]

## 2022-03-19 ASSESSMENT — ANXIETY QUESTIONNAIRES: GAD7 TOTAL SCORE: 3

## 2022-03-19 ASSESSMENT — PATIENT HEALTH QUESTIONNAIRE - PHQ9: SUM OF ALL RESPONSES TO PHQ QUESTIONS 1-9: 4

## 2022-03-23 NOTE — TELEPHONE ENCOUNTER
Prior Authorization Not Needed per Insurance    Medication: traMADol (ULTRAM) 50 MG tablet  Insurance Company: [employer] - Phone [phone] Fax [phone]  Expected CoPay:      Pharmacy Filling the Rx: [id number] [id number] AT [facility] [address]  Pharmacy Notified: Yes  Patient Notified: Yes    Drug is covered by current benefit plan. No further PA activity needed  Spoke with pharmacy- medication processed through insurance.

## 2022-04-08 DIAGNOSIS — Z11.59 ENCOUNTER FOR SCREENING FOR OTHER VIRAL DISEASES: Primary | ICD-10-CM

## 2022-04-09 ENCOUNTER — HEALTH MAINTENANCE LETTER (OUTPATIENT)
Age: 67
End: 2022-04-09

## 2022-04-14 ENCOUNTER — TELEPHONE (OUTPATIENT)
Dept: PULMONOLOGY | Facility: CLINIC | Age: 67
End: 2022-04-14
Payer: COMMERCIAL

## 2022-04-25 NOTE — PROGRESS NOTES
HX:66 year old male with a history of NICM s/p OHT (5/6/21) postoperative course was complicated by right pleural air leak with prolonged chest tube, paroxysmal atrial fibrillation, COPD, CKDIII, DMII, anemia who presents to the hospital for generalized weakness, fatigue and multiple falls     Cardiac:  VS:VSS, MAP 91  IV:PIV-SL  Tubes:NA  Neuro:A/Ox4, flat affect  Resp:Denied shortness of breath  GI/:Voiding fair amount, No BM this shift, received medication.  Nutrition:Good PO for breakfast and lunch, likes Glucerna  Endo:Glucose 86 this AM. Symptomatic and asked for apple juice, ate breakfast. Lunch glucose 90. Received orders for sliding scale, but not needed this shift. Lantus dose decreased.  Skin:Intact except for CT site on back.  Activity:Up at bedside independently, ambulated in halls and to gym with therapy and walker.   Pain:C/O headache, treated with ultram with some relief.   Social:No visitors, on cell phone throughout shift.  Plan:Monitor for symptoms from chest fluid collection. Potential fluid collection aspiration on Monday. Continue current cares and notify providers with questions or concerns. Probable discharge Monday.   Patient understands condition, prognosis and need for follow up care.

## 2022-04-26 ENCOUNTER — TELEPHONE (OUTPATIENT)
Dept: TRANSPLANT | Facility: CLINIC | Age: 67
End: 2022-04-26

## 2022-04-26 ENCOUNTER — OFFICE VISIT (OUTPATIENT)
Dept: URGENT CARE | Facility: URGENT CARE | Age: 67
End: 2022-04-26
Payer: COMMERCIAL

## 2022-04-26 VITALS
HEART RATE: 110 BPM | OXYGEN SATURATION: 97 % | DIASTOLIC BLOOD PRESSURE: 77 MMHG | BODY MASS INDEX: 32.49 KG/M2 | HEIGHT: 68 IN | SYSTOLIC BLOOD PRESSURE: 134 MMHG | WEIGHT: 214.4 LBS | TEMPERATURE: 97.7 F

## 2022-04-26 DIAGNOSIS — Z94.1 HEART REPLACED BY TRANSPLANT (H): ICD-10-CM

## 2022-04-26 DIAGNOSIS — J06.9 UPPER RESPIRATORY TRACT INFECTION, UNSPECIFIED TYPE: ICD-10-CM

## 2022-04-26 DIAGNOSIS — R73.03 PREDIABETES: ICD-10-CM

## 2022-04-26 DIAGNOSIS — R06.09 DYSPNEA ON EXERTION: Primary | ICD-10-CM

## 2022-04-26 LAB
BASOPHILS # BLD AUTO: 0 10E3/UL (ref 0–0.2)
BASOPHILS NFR BLD AUTO: 0 %
EOSINOPHIL # BLD AUTO: 0 10E3/UL (ref 0–0.7)
EOSINOPHIL NFR BLD AUTO: 0 %
ERYTHROCYTE [DISTWIDTH] IN BLOOD BY AUTOMATED COUNT: 13.1 % (ref 10–15)
FLUAV AG SPEC QL IA: NEGATIVE
FLUBV AG SPEC QL IA: NEGATIVE
HBA1C MFR BLD: 5.9 % (ref 0–5.6)
HCT VFR BLD AUTO: 34.4 % (ref 40–53)
HGB BLD-MCNC: 10.6 G/DL (ref 13.3–17.7)
IMM GRANULOCYTES # BLD: 0 10E3/UL
IMM GRANULOCYTES NFR BLD: 0 %
LYMPHOCYTES # BLD AUTO: 0.7 10E3/UL (ref 0.8–5.3)
LYMPHOCYTES NFR BLD AUTO: 7 %
MCH RBC QN AUTO: 29.5 PG (ref 26.5–33)
MCHC RBC AUTO-ENTMCNC: 30.8 G/DL (ref 31.5–36.5)
MCV RBC AUTO: 96 FL (ref 78–100)
MONOCYTES # BLD AUTO: 0.4 10E3/UL (ref 0–1.3)
MONOCYTES NFR BLD AUTO: 4 %
NEUTROPHILS # BLD AUTO: 9.6 10E3/UL (ref 1.6–8.3)
NEUTROPHILS NFR BLD AUTO: 89 %
PLATELET # BLD AUTO: 222 10E3/UL (ref 150–450)
RBC # BLD AUTO: 3.59 10E6/UL (ref 4.4–5.9)
WBC # BLD AUTO: 10.8 10E3/UL (ref 4–11)

## 2022-04-26 PROCEDURE — 85025 COMPLETE CBC W/AUTO DIFF WBC: CPT | Performed by: EMERGENCY MEDICINE

## 2022-04-26 PROCEDURE — U0005 INFEC AGEN DETEC AMPLI PROBE: HCPCS | Performed by: EMERGENCY MEDICINE

## 2022-04-26 PROCEDURE — 99215 OFFICE O/P EST HI 40 MIN: CPT | Performed by: EMERGENCY MEDICINE

## 2022-04-26 PROCEDURE — U0003 INFECTIOUS AGENT DETECTION BY NUCLEIC ACID (DNA OR RNA); SEVERE ACUTE RESPIRATORY SYNDROME CORONAVIRUS 2 (SARS-COV-2) (CORONAVIRUS DISEASE [COVID-19]), AMPLIFIED PROBE TECHNIQUE, MAKING USE OF HIGH THROUGHPUT TECHNOLOGIES AS DESCRIBED BY CMS-2020-01-R: HCPCS | Performed by: EMERGENCY MEDICINE

## 2022-04-26 PROCEDURE — 93000 ELECTROCARDIOGRAM COMPLETE: CPT | Performed by: EMERGENCY MEDICINE

## 2022-04-26 PROCEDURE — 36415 COLL VENOUS BLD VENIPUNCTURE: CPT | Performed by: EMERGENCY MEDICINE

## 2022-04-26 PROCEDURE — 87804 INFLUENZA ASSAY W/OPTIC: CPT | Performed by: EMERGENCY MEDICINE

## 2022-04-26 PROCEDURE — 83036 HEMOGLOBIN GLYCOSYLATED A1C: CPT | Performed by: EMERGENCY MEDICINE

## 2022-04-26 NOTE — TELEPHONE ENCOUNTER
[employer] message sent to patient. EKG reviewed by NP and unchanged from prior. Requested that patient update transplant coordinator with new or worsening cold symptoms.

## 2022-04-26 NOTE — PROGRESS NOTES
"Assessment & Plan     Diagnosis:    (R06.00) Dyspnea on exertion  (primary encounter diagnosis)    (R73.03) Prediabetes    (Z94.1) Heart replaced by transplant (H)    (J06.9) Upper respiratory tract infection, unspecified type      Medical Decision Making:  Jim Willingham is a 66 year old male with complex PMH including CKD, COPD and s/p heart transplant 5/6/21 who presents for evaluation of sore throat and dyspnea on exertion.  He has no signs of strep throat, PTA or history of clinical exam concerning for retropharyngeal abscess or epiglottitis.    Patient notes that he was bringing in the laundry today and was feeling short of breath with this which is unusual for him.  Does note that he has a history of COPD and gets frequent bouts of bronchitis, but has not for some time.  He states that he is having a bit of a sore throat, or \"tightness in the throat\" and upper chest, but no chest pain.  On exam he is well-appearing, tachycardic, in no respiratory distress.  He is not hypoxic, febrile, chest x-ray is clear, influenza testing is negative.  COVID-19 testing is pending.  CBC demonstrates mild anemia near baseline, white blood cell count is 10.8.  Hemoglobin A1c obtained per his request is 5.9; follow up with PCP for this.    EKG shows sinus tachycardia and there is evidence of RBBB in V1. Questionable T wave inversion in V2/V3.  No prior EKG to compare s/p cardiac transplant; prior in our system in care everywhere shows paced rhythm with native heart. He has no chest pain at this time.  Given his complex past medical history, unclear etiology of RBBB on EKG, and concern for dyspnea on exertion I recommend the patient go to the ER now for further evaluation. Patient voices understanding and agreement with the plan. All questions answered.      MICHEL Bolton [facility]    Subjective     Jim Willingham is a 66 year old male who presents to clinic today for the following health " "issues:  Chief Complaint   Patient presents with     Shortness of Breath     HX OF BRONCHITIS. HAD A HEART TRANSPLANT ABOUT A YEAR AND HALF AGO     Cough       HPI    URI Adult    Onset of symptoms was 2 day(s) ago.  Course of illness is worsening.    Severity moderate  Current and Associated symptoms: shortness of breath, cough - non-productive and sore throat and some tightness in the throat.  Predisposing factors include HX of COPD and heart transplant.       Patient describes the symptoms as \"I got winded bringing in the laundry today which concerned me.\"     Patient denies any chest pain, leg swelling, nausea, vomiting, diarrhea, difficulties swallowing, breathing difficulty at rest.     Review of Systems    See HPI    Objective      Vitals: /77 (BP Location: Right arm, Patient Position: Sitting, Cuff Size: Adult Regular)   Pulse 110   Temp 97.7  F (36.5  C) (Tympanic)   Ht 1.727 m (5' 8\")   Wt 97.3 kg (214 lb 6.4 oz)   SpO2 97%   BMI 32.60 kg/m    Resp: 20    Patient Vitals for the past 24 hrs:   BP Temp Temp src Pulse SpO2 Height Weight   04/26/22 1150 134/77 97.7  F (36.5  C) Tympanic 110 97 % 1.727 m (5' 8\") 97.3 kg (214 lb 6.4 oz)       Vital signs reviewed by: Bijan Traore PA-C    Physical Exam   Constitutional: Patient is alert and cooperative. No acute distress.  Neck: No JVD  Cardiovascular: tachycardic. Regular rhythm.   Pulmonary/Chest: Lungs are clear to auscultation throughout. Effort normal. No respiratory distress. No wheezes, rales or rhonchi. Occasional cough.  GI: Abdomen is soft and non-tender throughout.  Neurological: Alert and oriented x3. Strength and sensation are intact and symmetric in the bilateral upper and lower extremities.   Skin: No rash noted on visualized skin.  Psychiatric:The patient has a normal mood and affect.     Labs/Imaging:  Results for orders placed or performed in visit on 04/26/22   XR Chest 2 Views     Status: None    Narrative    CHEST TWO VIEWS  " 4/26/2022 12:52 PM     HISTORY: Dyspnea on exertion.    COMPARISON: December 15, 2021       Impression    IMPRESSION:  There are no acute infiltrates. The cardiac silhouette is  not enlarged. Pulmonary vasculature is unremarkable.     EUGENIO YIN MD         SYSTEM ID:  [id number]   Results for orders placed or performed in visit on 04/26/22   Hemoglobin A1c     Status: Abnormal   Result Value Ref Range    Hemoglobin A1C 5.9 (H) 0.0 - 5.6 %   CBC with platelets and differential     Status: Abnormal   Result Value Ref Range    WBC Count 10.8 4.0 - 11.0 10e3/uL    RBC Count 3.59 (L) 4.40 - 5.90 10e6/uL    Hemoglobin 10.6 (L) 13.3 - 17.7 g/dL    Hematocrit 34.4 (L) 40.0 - 53.0 %    MCV 96 78 - 100 fL    MCH 29.5 26.5 - 33.0 pg    MCHC 30.8 (L) 31.5 - 36.5 g/dL    RDW 13.1 10.0 - 15.0 %    Platelet Count 222 150 - 450 10e3/uL    % Neutrophils 89 %    % Lymphocytes 7 %    % Monocytes 4 %    % Eosinophils 0 %    % Basophils 0 %    % Immature Granulocytes 0 %    Absolute Neutrophils 9.6 (H) 1.6 - 8.3 10e3/uL    Absolute Lymphocytes 0.7 (L) 0.8 - 5.3 10e3/uL    Absolute Monocytes 0.4 0.0 - 1.3 10e3/uL    Absolute Eosinophils 0.0 0.0 - 0.7 10e3/uL    Absolute Basophils 0.0 0.0 - 0.2 10e3/uL    Absolute Immature Granulocytes 0.0 <=0.4 10e3/uL   Influenza A & B Antigen - Clinic Collect     Status: Normal    Specimen: Nose; Swab   Result Value Ref Range    Influenza A antigen Negative Negative    Influenza B antigen Negative Negative    Narrative    Test results must be correlated with clinical data. If necessary, results should be confirmed by a molecular assay or viral culture.   CBC with platelets and differential     Status: Abnormal    Narrative    The following orders were created for panel order CBC with platelets and differential.  Procedure                               Abnormality         Status                     ---------                               -----------         ------                     CBC with  platelets and d...[[id number]]  Abnormal            Final result                 Please view results for these tests on the individual orders.       EKG:  EKG - Reviewed and interpreted by: Bijan Traore PA-C (please see electronic EKG file for full report).  Sinus. Tachycardic. regular rhythm. RBBB V1.  ?T-wave inversion V2/V3.  Prior comparison was with native heart - No prior EKG to compare s/p heart transplant.    Rate: 110 bpm   NC: 156 ms  QTc: 419 ms        Bijan Traore PA-C, April 26, 2022

## 2022-04-26 NOTE — TELEPHONE ENCOUNTER
Patient Called went to urgent Care [facility]  Home now   Wanted to discuss       Call back needed? Yes    Return Call Needed  Same as documented in contacts section  When to return call?: Same day: Route [facility]

## 2022-04-26 NOTE — PATIENT INSTRUCTIONS
Go to the ER for further evaliuaiton. Unclear EKG with no prior to compare since heart transplant for our electronic medial record system.

## 2022-04-26 NOTE — TELEPHONE ENCOUNTER
"Returned call to patient. Pt reporting that he went to urgent care today for mild shortness of breath with activity and intermittent dry cough. Pt reports mild chest congestion. Influenzas negative. Covid test in process. Pt returned home from urgent care and called to have someone review his 12-lead EKG. PA that saw patient in urgent care advised \"Go to the ER for further evaluation. Unclear EKG with no prior to compare since heart transplant for our electronic medial record system\".    EKG appears wnl/unchanged from prior;  w/BBB. Requested NP in clinic today to evaluate EKG and will return message to patient.     Ok to take OTC cold medicine if needed. Advised patient to avoid cold medicine with pseudoephedrine. Patient verbalized understanding.   "

## 2022-04-27 LAB — SARS-COV-2 RNA RESP QL NAA+PROBE: NEGATIVE

## 2022-05-07 ENCOUNTER — TELEPHONE (OUTPATIENT)
Dept: TRANSPLANT | Facility: CLINIC | Age: 67
End: 2022-05-07
Payer: COMMERCIAL

## 2022-05-07 DIAGNOSIS — Z94.1 S/P ORTHOTOPIC HEART TRANSPLANT (H): ICD-10-CM

## 2022-05-07 DIAGNOSIS — Z94.1 TRANSPLANTED HEART (H): ICD-10-CM

## 2022-05-07 RX ORDER — TACROLIMUS 1 MG/1
CAPSULE ORAL
Qty: 240 CAPSULE | Refills: 11 | Status: SHIPPED | OUTPATIENT
Start: 2022-05-07 | End: 2023-05-16

## 2022-05-07 RX ORDER — TACROLIMUS 0.5 MG/1
CAPSULE ORAL
Qty: 30 CAPSULE | Refills: 11 | Status: SHIPPED | OUTPATIENT
Start: 2022-05-07 | End: 2023-05-16

## 2022-05-07 NOTE — TELEPHONE ENCOUNTER
TRIAGE Pt notes he is out of tac - found it cheapest at [facility]. Needed scripts filled today. DONE

## 2022-05-10 ENCOUNTER — TELEPHONE (OUTPATIENT)
Dept: TRANSPLANT | Facility: CLINIC | Age: 67
End: 2022-05-10
Payer: COMMERCIAL

## 2022-05-10 NOTE — TELEPHONE ENCOUNTER
Patient called to touch base with the RNCC regarding some SOB patient has been sick, but is getting better and also needs to talk about a procedure for next week.

## 2022-05-10 NOTE — TELEPHONE ENCOUNTER
Call returned to patient. Pt states he is a little sob but is still recovering from a recent bronchitis. O2 sats are WNL.  Pt due for rhc/hbx with covid testing prior for next Tuesday 5/17, but pt also informed me his insurance changed and now he doesn't think the [facility] is within network. Message sent to SW to inquire if there is anything we can do. In the meantime, writer encouraged pt to present to ER if sob gets worse.

## 2022-05-11 ENCOUNTER — TELEPHONE (OUTPATIENT)
Dept: FAMILY MEDICINE | Facility: CLINIC | Age: 67
End: 2022-05-11
Payer: COMMERCIAL

## 2022-05-13 ENCOUNTER — LAB (OUTPATIENT)
Dept: URGENT CARE | Facility: URGENT CARE | Age: 67
End: 2022-05-13
Attending: SPECIALIST
Payer: COMMERCIAL

## 2022-05-13 DIAGNOSIS — Z11.59 ENCOUNTER FOR SCREENING FOR OTHER VIRAL DISEASES: ICD-10-CM

## 2022-05-13 PROCEDURE — U0003 INFECTIOUS AGENT DETECTION BY NUCLEIC ACID (DNA OR RNA); SEVERE ACUTE RESPIRATORY SYNDROME CORONAVIRUS 2 (SARS-COV-2) (CORONAVIRUS DISEASE [COVID-19]), AMPLIFIED PROBE TECHNIQUE, MAKING USE OF HIGH THROUGHPUT TECHNOLOGIES AS DESCRIBED BY CMS-2020-01-R: HCPCS

## 2022-05-13 PROCEDURE — U0005 INFEC AGEN DETEC AMPLI PROBE: HCPCS

## 2022-05-14 LAB — SARS-COV-2 RNA RESP QL NAA+PROBE: NEGATIVE

## 2022-05-16 ENCOUNTER — TELEPHONE (OUTPATIENT)
Dept: CARDIOLOGY | Facility: CLINIC | Age: 67
End: 2022-05-16
Payer: COMMERCIAL

## 2022-05-16 ENCOUNTER — TELEPHONE (OUTPATIENT)
Dept: TRANSPLANT | Facility: CLINIC | Age: 67
End: 2022-05-16
Payer: COMMERCIAL

## 2022-05-16 DIAGNOSIS — Z94.1 TRANSPLANTED HEART (H): Primary | ICD-10-CM

## 2022-05-16 RX ORDER — LIDOCAINE 40 MG/G
CREAM TOPICAL
Status: CANCELLED | OUTPATIENT
Start: 2022-05-16

## 2022-05-16 RX ORDER — ASPIRIN 81 MG/1
243 TABLET, CHEWABLE ORAL ONCE
Status: CANCELLED | OUTPATIENT
Start: 2022-05-16

## 2022-05-16 RX ORDER — SODIUM CHLORIDE 9 MG/ML
INJECTION, SOLUTION INTRAVENOUS CONTINUOUS
Status: CANCELLED | OUTPATIENT
Start: 2022-05-16

## 2022-05-16 RX ORDER — ASPIRIN 325 MG
325 TABLET ORAL ONCE
Status: CANCELLED | OUTPATIENT
Start: 2022-05-16 | End: 2022-05-16

## 2022-05-16 NOTE — TELEPHONE ENCOUNTER
Call complete for pre procedure reminder, travel screen and updated visitor policy.  5/13/22 COVID Negative  Sharmila Bermudez RN

## 2022-05-17 ENCOUNTER — APPOINTMENT (OUTPATIENT)
Dept: MEDSURG UNIT | Facility: CLINIC | Age: 67
End: 2022-05-17
Attending: INTERNAL MEDICINE
Payer: COMMERCIAL

## 2022-05-17 ENCOUNTER — APPOINTMENT (OUTPATIENT)
Dept: LAB | Facility: CLINIC | Age: 67
End: 2022-05-17
Attending: INTERNAL MEDICINE
Payer: COMMERCIAL

## 2022-05-17 ENCOUNTER — HOSPITAL ENCOUNTER (OUTPATIENT)
Facility: CLINIC | Age: 67
Discharge: HOME | End: 2022-05-17
Attending: INTERNAL MEDICINE | Admitting: INTERNAL MEDICINE
Payer: COMMERCIAL

## 2022-05-17 ENCOUNTER — HOSPITAL ENCOUNTER (OUTPATIENT)
Dept: GENERAL RADIOLOGY | Facility: CLINIC | Age: 67
Discharge: HOME | End: 2022-05-17
Attending: INTERNAL MEDICINE | Admitting: INTERNAL MEDICINE
Payer: COMMERCIAL

## 2022-05-17 VITALS
DIASTOLIC BLOOD PRESSURE: 93 MMHG | BODY MASS INDEX: 29.25 KG/M2 | OXYGEN SATURATION: 98 % | SYSTOLIC BLOOD PRESSURE: 134 MMHG | HEART RATE: 104 BPM | TEMPERATURE: 97.8 F | RESPIRATION RATE: 16 BRPM | HEIGHT: 68 IN | WEIGHT: 193 LBS

## 2022-05-17 DIAGNOSIS — Z94.1 TRANSPLANTED HEART (H): ICD-10-CM

## 2022-05-17 PROBLEM — Z98.890 STATUS POST CORONARY ANGIOGRAM: Status: ACTIVE | Noted: 2022-05-17

## 2022-05-17 LAB
ALBUMIN SERPL-MCNC: 3.5 G/DL (ref 3.4–5)
ALP SERPL-CCNC: 141 U/L (ref 40–150)
ALT SERPL W P-5'-P-CCNC: 24 U/L (ref 0–70)
ANION GAP SERPL CALCULATED.3IONS-SCNC: 8 MMOL/L (ref 3–14)
AST SERPL W P-5'-P-CCNC: 18 U/L (ref 0–45)
BASOPHILS # BLD AUTO: 0.1 10E3/UL (ref 0–0.2)
BASOPHILS NFR BLD AUTO: 1 %
BILIRUB SERPL-MCNC: 0.7 MG/DL (ref 0.2–1.3)
BUN SERPL-MCNC: 32 MG/DL (ref 7–30)
CALCIUM SERPL-MCNC: 8.8 MG/DL (ref 8.5–10.1)
CHLORIDE BLD-SCNC: 110 MMOL/L (ref 94–109)
CHOLEST SERPL-MCNC: 116 MG/DL
CK SERPL-CCNC: 96 U/L (ref 30–300)
CMV DNA SPEC NAA+PROBE-ACNC: NOT DETECTED IU/ML
CO2 SERPL-SCNC: 20 MMOL/L (ref 20–32)
CREAT SERPL-MCNC: 1.76 MG/DL (ref 0.66–1.25)
EOSINOPHIL # BLD AUTO: 0.3 10E3/UL (ref 0–0.7)
EOSINOPHIL NFR BLD AUTO: 4 %
ERYTHROCYTE [DISTWIDTH] IN BLOOD BY AUTOMATED COUNT: 13.2 % (ref 10–15)
FASTING STATUS PATIENT QL REPORTED: YES
GFR SERPL CREATININE-BSD FRML MDRD: 43 ML/MIN/1.73M2
GLUCOSE BLD-MCNC: 123 MG/DL (ref 70–99)
HBA1C MFR BLD: 5.5 % (ref 0–5.6)
HCT VFR BLD AUTO: 36.4 % (ref 40–53)
HDLC SERPL-MCNC: 61 MG/DL
HGB BLD-MCNC: 11.3 G/DL (ref 13.3–17.7)
HGB BLD-MCNC: 11.3 G/DL (ref 13.3–17.7)
IMM GRANULOCYTES # BLD: 0 10E3/UL
IMM GRANULOCYTES NFR BLD: 1 %
LDLC SERPL CALC-MCNC: 42 MG/DL
LYMPHOCYTES # BLD AUTO: 1.5 10E3/UL (ref 0.8–5.3)
LYMPHOCYTES NFR BLD AUTO: 23 %
MAGNESIUM SERPL-MCNC: 1.9 MG/DL (ref 1.6–2.3)
MCH RBC QN AUTO: 29.2 PG (ref 26.5–33)
MCHC RBC AUTO-ENTMCNC: 31 G/DL (ref 31.5–36.5)
MCV RBC AUTO: 94 FL (ref 78–100)
MONOCYTES # BLD AUTO: 0.9 10E3/UL (ref 0–1.3)
MONOCYTES NFR BLD AUTO: 13 %
NEUTROPHILS # BLD AUTO: 3.8 10E3/UL (ref 1.6–8.3)
NEUTROPHILS NFR BLD AUTO: 58 %
NONHDLC SERPL-MCNC: 55 MG/DL
NRBC # BLD AUTO: 0 10E3/UL
NRBC BLD AUTO-RTO: 0 /100
OXYHGB MFR BLDV: 70 % (ref 92–100)
PHOSPHATE SERPL-MCNC: 3.5 MG/DL (ref 2.5–4.5)
PLATELET # BLD AUTO: 288 10E3/UL (ref 150–450)
POTASSIUM BLD-SCNC: 4 MMOL/L (ref 3.4–5.3)
PROT SERPL-MCNC: 7.2 G/DL (ref 6.8–8.8)
PSA SERPL-MCNC: 0.5 UG/L (ref 0–4)
RBC # BLD AUTO: 3.87 10E6/UL (ref 4.4–5.9)
SODIUM SERPL-SCNC: 138 MMOL/L (ref 133–144)
TACROLIMUS BLD-MCNC: 4.9 UG/L (ref 5–15)
TME LAST DOSE: ABNORMAL H
TME LAST DOSE: ABNORMAL H
TRIGL SERPL-MCNC: 64 MG/DL
TSH SERPL DL<=0.005 MIU/L-ACNC: 1.7 MU/L (ref 0.4–4)
WBC # BLD AUTO: 6.6 10E3/UL (ref 4–11)

## 2022-05-17 PROCEDURE — 999N000133 HC STATISTIC PP CARE STAGE 2

## 2022-05-17 PROCEDURE — 36415 COLL VENOUS BLD VENIPUNCTURE: CPT | Performed by: INTERNAL MEDICINE

## 2022-05-17 PROCEDURE — 86352 CELL FUNCTION ASSAY W/STIM: CPT | Performed by: INTERNAL MEDICINE

## 2022-05-17 PROCEDURE — 83036 HEMOGLOBIN GLYCOSYLATED A1C: CPT | Performed by: INTERNAL MEDICINE

## 2022-05-17 PROCEDURE — C1887 CATHETER, GUIDING: HCPCS | Performed by: INTERNAL MEDICINE

## 2022-05-17 PROCEDURE — 258N000003 HC RX IP 258 OP 636: Performed by: INTERNAL MEDICINE

## 2022-05-17 PROCEDURE — 93010 ELECTROCARDIOGRAM REPORT: CPT | Performed by: INTERNAL MEDICINE

## 2022-05-17 PROCEDURE — 80061 LIPID PANEL: CPT | Performed by: INTERNAL MEDICINE

## 2022-05-17 PROCEDURE — 88346 IMFLUOR 1ST 1ANTB STAIN PX: CPT | Mod: 26 | Performed by: PATHOLOGY

## 2022-05-17 PROCEDURE — 93454 CORONARY ARTERY ANGIO S&I: CPT | Mod: 26 | Performed by: INTERNAL MEDICINE

## 2022-05-17 PROCEDURE — 93505 ENDOMYOCARDIAL BIOPSY: CPT | Performed by: INTERNAL MEDICINE

## 2022-05-17 PROCEDURE — 84100 ASSAY OF PHOSPHORUS: CPT | Performed by: INTERNAL MEDICINE

## 2022-05-17 PROCEDURE — 80197 ASSAY OF TACROLIMUS: CPT | Performed by: INTERNAL MEDICINE

## 2022-05-17 PROCEDURE — 999N000054 HC STATISTIC EKG NON-CHARGEABLE

## 2022-05-17 PROCEDURE — 999N000142 HC STATISTIC PROCEDURE PREP ONLY

## 2022-05-17 PROCEDURE — 84443 ASSAY THYROID STIM HORMONE: CPT | Performed by: INTERNAL MEDICINE

## 2022-05-17 PROCEDURE — G0103 PSA SCREENING: HCPCS | Performed by: INTERNAL MEDICINE

## 2022-05-17 PROCEDURE — 85018 HEMOGLOBIN: CPT

## 2022-05-17 PROCEDURE — 83735 ASSAY OF MAGNESIUM: CPT | Performed by: INTERNAL MEDICINE

## 2022-05-17 PROCEDURE — 86832 HLA CLASS I HIGH DEFIN QUAL: CPT | Performed by: INTERNAL MEDICINE

## 2022-05-17 PROCEDURE — 88350 IMFLUOR EA ADDL 1ANTB STN PX: CPT | Mod: TC | Performed by: INTERNAL MEDICINE

## 2022-05-17 PROCEDURE — 71046 X-RAY EXAM CHEST 2 VIEWS: CPT | Mod: 26 | Performed by: RADIOLOGY

## 2022-05-17 PROCEDURE — 93005 ELECTROCARDIOGRAM TRACING: CPT

## 2022-05-17 PROCEDURE — 93505 ENDOMYOCARDIAL BIOPSY: CPT | Mod: 26 | Performed by: INTERNAL MEDICINE

## 2022-05-17 PROCEDURE — 99153 MOD SED SAME PHYS/QHP EA: CPT | Performed by: INTERNAL MEDICINE

## 2022-05-17 PROCEDURE — 250N000013 HC RX MED GY IP 250 OP 250 PS 637: Performed by: INTERNAL MEDICINE

## 2022-05-17 PROCEDURE — 85025 COMPLETE CBC W/AUTO DIFF WBC: CPT | Performed by: INTERNAL MEDICINE

## 2022-05-17 PROCEDURE — 250N000011 HC RX IP 250 OP 636: Performed by: INTERNAL MEDICINE

## 2022-05-17 PROCEDURE — 86833 HLA CLASS II HIGH DEFIN QUAL: CPT | Performed by: INTERNAL MEDICINE

## 2022-05-17 PROCEDURE — 80053 COMPREHEN METABOLIC PANEL: CPT | Performed by: INTERNAL MEDICINE

## 2022-05-17 PROCEDURE — 99152 MOD SED SAME PHYS/QHP 5/>YRS: CPT | Performed by: INTERNAL MEDICINE

## 2022-05-17 PROCEDURE — 99152 MOD SED SAME PHYS/QHP 5/>YRS: CPT | Mod: GC | Performed by: INTERNAL MEDICINE

## 2022-05-17 PROCEDURE — 272N000001 HC OR GENERAL SUPPLY STERILE: Performed by: INTERNAL MEDICINE

## 2022-05-17 PROCEDURE — 250N000009 HC RX 250: Performed by: INTERNAL MEDICINE

## 2022-05-17 PROCEDURE — 87799 DETECT AGENT NOS DNA QUANT: CPT | Performed by: INTERNAL MEDICINE

## 2022-05-17 PROCEDURE — 88307 TISSUE EXAM BY PATHOLOGIST: CPT | Mod: 26 | Performed by: PATHOLOGY

## 2022-05-17 PROCEDURE — 71046 X-RAY EXAM CHEST 2 VIEWS: CPT

## 2022-05-17 PROCEDURE — 88350 IMFLUOR EA ADDL 1ANTB STN PX: CPT | Mod: 26 | Performed by: PATHOLOGY

## 2022-05-17 PROCEDURE — 82810 BLOOD GASES O2 SAT ONLY: CPT

## 2022-05-17 PROCEDURE — 99153 MOD SED SAME PHYS/QHP EA: CPT | Mod: GC | Performed by: INTERNAL MEDICINE

## 2022-05-17 PROCEDURE — 93456 R HRT CORONARY ARTERY ANGIO: CPT | Performed by: INTERNAL MEDICINE

## 2022-05-17 PROCEDURE — 82550 ASSAY OF CK (CPK): CPT | Performed by: INTERNAL MEDICINE

## 2022-05-17 PROCEDURE — C1894 INTRO/SHEATH, NON-LASER: HCPCS | Performed by: INTERNAL MEDICINE

## 2022-05-17 RX ORDER — EPTIFIBATIDE 2 MG/ML
180 INJECTION, SOLUTION INTRAVENOUS EVERY 10 MIN PRN
Status: DISCONTINUED | OUTPATIENT
Start: 2022-05-17 | End: 2022-05-17 | Stop reason: HOSPADM

## 2022-05-17 RX ORDER — ACETAMINOPHEN 325 MG/1
650 TABLET ORAL EVERY 4 HOURS PRN
Status: DISCONTINUED | OUTPATIENT
Start: 2022-05-17 | End: 2022-05-17 | Stop reason: HOSPADM

## 2022-05-17 RX ORDER — ARGATROBAN 1 MG/ML
350 INJECTION, SOLUTION INTRAVENOUS
Status: DISCONTINUED | OUTPATIENT
Start: 2022-05-17 | End: 2022-05-17 | Stop reason: HOSPADM

## 2022-05-17 RX ORDER — NALOXONE HYDROCHLORIDE 0.4 MG/ML
0.4 INJECTION, SOLUTION INTRAMUSCULAR; INTRAVENOUS; SUBCUTANEOUS
Status: DISCONTINUED | OUTPATIENT
Start: 2022-05-17 | End: 2022-05-17 | Stop reason: HOSPADM

## 2022-05-17 RX ORDER — ATROPINE SULFATE 0.1 MG/ML
0.5 INJECTION INTRAVENOUS
Status: DISCONTINUED | OUTPATIENT
Start: 2022-05-17 | End: 2022-05-17 | Stop reason: HOSPADM

## 2022-05-17 RX ORDER — EPTIFIBATIDE 2 MG/ML
1 INJECTION, SOLUTION INTRAVENOUS CONTINUOUS PRN
Status: DISCONTINUED | OUTPATIENT
Start: 2022-05-17 | End: 2022-05-17 | Stop reason: HOSPADM

## 2022-05-17 RX ORDER — LIDOCAINE 40 MG/G
CREAM TOPICAL
Status: DISCONTINUED | OUTPATIENT
Start: 2022-05-17 | End: 2022-05-17 | Stop reason: HOSPADM

## 2022-05-17 RX ORDER — ASPIRIN 325 MG
325 TABLET ORAL ONCE
Status: COMPLETED | OUTPATIENT
Start: 2022-05-17 | End: 2022-05-17

## 2022-05-17 RX ORDER — ARGATROBAN 1 MG/ML
150 INJECTION, SOLUTION INTRAVENOUS
Status: DISCONTINUED | OUTPATIENT
Start: 2022-05-17 | End: 2022-05-17 | Stop reason: HOSPADM

## 2022-05-17 RX ORDER — NITROGLYCERIN 20 MG/100ML
10-200 INJECTION INTRAVENOUS CONTINUOUS PRN
Status: DISCONTINUED | OUTPATIENT
Start: 2022-05-17 | End: 2022-05-17 | Stop reason: HOSPADM

## 2022-05-17 RX ORDER — NALOXONE HYDROCHLORIDE 0.4 MG/ML
0.2 INJECTION, SOLUTION INTRAMUSCULAR; INTRAVENOUS; SUBCUTANEOUS
Status: DISCONTINUED | OUTPATIENT
Start: 2022-05-17 | End: 2022-05-17 | Stop reason: HOSPADM

## 2022-05-17 RX ORDER — TIROFIBAN HYDROCHLORIDE 50 UG/ML
0.07 INJECTION INTRAVENOUS CONTINUOUS PRN
Status: DISCONTINUED | OUTPATIENT
Start: 2022-05-17 | End: 2022-05-17 | Stop reason: HOSPADM

## 2022-05-17 RX ORDER — FLUMAZENIL 0.1 MG/ML
0.2 INJECTION, SOLUTION INTRAVENOUS
Status: DISCONTINUED | OUTPATIENT
Start: 2022-05-17 | End: 2022-05-17 | Stop reason: HOSPADM

## 2022-05-17 RX ORDER — FENTANYL CITRATE 50 UG/ML
25 INJECTION, SOLUTION INTRAMUSCULAR; INTRAVENOUS
Status: DISCONTINUED | OUTPATIENT
Start: 2022-05-17 | End: 2022-05-17 | Stop reason: HOSPADM

## 2022-05-17 RX ORDER — FENTANYL CITRATE 50 UG/ML
INJECTION, SOLUTION INTRAMUSCULAR; INTRAVENOUS
Status: DISCONTINUED | OUTPATIENT
Start: 2022-05-17 | End: 2022-05-17 | Stop reason: HOSPADM

## 2022-05-17 RX ORDER — ASPIRIN 81 MG/1
243 TABLET, CHEWABLE ORAL ONCE
Status: COMPLETED | OUTPATIENT
Start: 2022-05-17 | End: 2022-05-17

## 2022-05-17 RX ORDER — OXYCODONE HYDROCHLORIDE 10 MG/1
10 TABLET ORAL EVERY 4 HOURS PRN
Status: DISCONTINUED | OUTPATIENT
Start: 2022-05-17 | End: 2022-05-17 | Stop reason: HOSPADM

## 2022-05-17 RX ORDER — HEPARIN SODIUM 10000 [USP'U]/100ML
100-1000 INJECTION, SOLUTION INTRAVENOUS CONTINUOUS PRN
Status: DISCONTINUED | OUTPATIENT
Start: 2022-05-17 | End: 2022-05-17 | Stop reason: HOSPADM

## 2022-05-17 RX ORDER — OXYCODONE HYDROCHLORIDE 5 MG/1
5 TABLET ORAL EVERY 4 HOURS PRN
Status: DISCONTINUED | OUTPATIENT
Start: 2022-05-17 | End: 2022-05-17 | Stop reason: HOSPADM

## 2022-05-17 RX ORDER — EPTIFIBATIDE 2 MG/ML
2 INJECTION, SOLUTION INTRAVENOUS CONTINUOUS PRN
Status: DISCONTINUED | OUTPATIENT
Start: 2022-05-17 | End: 2022-05-17 | Stop reason: HOSPADM

## 2022-05-17 RX ORDER — NITROGLYCERIN 5 MG/ML
VIAL (ML) INTRAVENOUS
Status: DISCONTINUED | OUTPATIENT
Start: 2022-05-17 | End: 2022-05-17 | Stop reason: HOSPADM

## 2022-05-17 RX ORDER — DOPAMINE HYDROCHLORIDE 160 MG/100ML
2-20 INJECTION, SOLUTION INTRAVENOUS CONTINUOUS PRN
Status: DISCONTINUED | OUTPATIENT
Start: 2022-05-17 | End: 2022-05-17 | Stop reason: HOSPADM

## 2022-05-17 RX ORDER — DOBUTAMINE HYDROCHLORIDE 200 MG/100ML
2-20 INJECTION INTRAVENOUS CONTINUOUS PRN
Status: DISCONTINUED | OUTPATIENT
Start: 2022-05-17 | End: 2022-05-17 | Stop reason: HOSPADM

## 2022-05-17 RX ORDER — SODIUM CHLORIDE 9 MG/ML
INJECTION, SOLUTION INTRAVENOUS CONTINUOUS
Status: DISCONTINUED | OUTPATIENT
Start: 2022-05-17 | End: 2022-05-17 | Stop reason: HOSPADM

## 2022-05-17 RX ORDER — ONDANSETRON 4 MG/1
4 TABLET, ORALLY DISINTEGRATING ORAL EVERY 6 HOURS PRN
Status: DISCONTINUED | OUTPATIENT
Start: 2022-05-17 | End: 2022-05-17 | Stop reason: HOSPADM

## 2022-05-17 RX ORDER — NICARDIPINE HYDROCHLORIDE 2.5 MG/ML
INJECTION INTRAVENOUS
Status: DISCONTINUED | OUTPATIENT
Start: 2022-05-17 | End: 2022-05-17 | Stop reason: HOSPADM

## 2022-05-17 RX ORDER — HEPARIN SODIUM 1000 [USP'U]/ML
INJECTION, SOLUTION INTRAVENOUS; SUBCUTANEOUS
Status: DISCONTINUED | OUTPATIENT
Start: 2022-05-17 | End: 2022-05-17 | Stop reason: HOSPADM

## 2022-05-17 RX ADMIN — LIDOCAINE: 40 CREAM TOPICAL at 10:18

## 2022-05-17 RX ADMIN — SODIUM CHLORIDE: 9 INJECTION, SOLUTION INTRAVENOUS at 10:18

## 2022-05-17 RX ADMIN — ASPIRIN 325 MG ORAL TABLET 325 MG: 325 PILL ORAL at 10:18

## 2022-05-17 NOTE — DISCHARGE INSTRUCTIONS
Going Home after Coronary Angiogram and Right Heart Catheterization  ______________________________________________  After you go home:  Have an adult stay with you for 24 hours.  Drink plenty of fluids.  You may eat your normal diet, unless your doctor tells you otherwise.  For 24 hours:  Relax and take it easy.  Do NOT smoke.  Do NOT make any important or legal decisions.  Do NOT drive or operate machines at home or at work.  Do NOT drink alcohol.  Remove the Band-Aid after 24 hours. If there is minor oozing, apply another Band-aid and remove it after 12 hours.  For 2 days, do NOT have sex or do any heavy exercise.  Do NOT take a bath, or use a hot tub or pool for at least 3 days. You may shower.    Care of wrist site  It is normal to have soreness at the puncture site and mild tingling in your hand for up to 3 days.  For 2 days, do not use your hand or arm to support your weight (such as rising from a chair) or bend your wrist (such as lifting a garage door).  For 2 days, do not lift more than 5 pounds or exercise your arm (tennis, golf or bowling).    If you start bleeding from the site in your arm:  Sit down and press firmly on the site with your fingers for 10 minutes. Call your doctor as soon as you can.  If the bleeding stops, sit still and keep your wrist straight for 2 hours.    Care of neck site  Monitor neck site for bleeding, swelling, or voice changes. If you notice bleeding or swelling immediately apply pressure to the site and call number below to speak with Cardiology Fellow.  If you experience any changes in your breathing you should call your doctor immediately or come to the closest Emergency Department.    Call 911 right away if you have bleeding that is heavy or does not stop.    Call your doctor if:  You have a large or growing hard lump around the site.  The site is red, swollen, hot or tender.  Blood or fluid is draining from the site.  You have chills or a fever greater than 101 F  (38 C).  Your leg or arm feels numb or cool.  You have hives, a rash or unusual itching.    [facility] [place]:  [phone] (7 days a week)

## 2022-05-17 NOTE — H&P
History and Physical: Cardiology Cath Lab Service    Jim Willingham MRN# [id number]   YOB: 1955 Age: 66 year old         Assessment and plan:   Jim Willingham is a 66 year old male with a history of 66yr old male with a history of NICM (s/p HM2 LVAD 6/2017) s/p OHT 5/6/21, VT, AFib, CKD, DMII, and COPD who is referred by Dr. Montgomery for RHC/heart biopsy/coronary angiogram for routine surveillance     # Routine coronary angiogram, RHC, heart biopsy  - No contraindications noted, will move forward with the procedure(s).  - Of note, patient did eat 1 oreo this AM ~ 5 AM causing cMRI to be cancelled  - Patient will be admitted as OP to Obs unit post procedure, with plans to discharge home after post procedure orders have been met    TIM Mata, CNP  [facility] Cardiology Cath Lab Services  [phone]    HPI:   Jim Willingham is a 66 year old male with a history of 66yr old male with a history of NICM (s/p HM2 LVAD 6/2017) s/p OHT 5/6/21, VT, AFib, CKD, DMII, and COPD who is referred by Dr. Montgomery for RHC/heart biopsy/coronary angiogram for routine surveillance   Patient reports feeling well today. Excited for upcoming trip to [place] with granddaughter in June. Endorses intermittent RESENDIZ but attributes this to his COPD. No chest pain, dyspnea at rest, lower extremity edema, dizziness, lightheadedness, palpitations, presyncope, or syncope. No recent illness, fevers, chills, nausea, vomiting, diarrhea, dysuria, or abdominal pain. No headache, visual changes, numbness, tingling, or weakness. No bleeding or clotting problems.     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 7.66 cm, Restrictive pattern , Severe mitral valve regurgitation     CECILIA (obstructive sleep apnea) 05/11/2017     Paroxysmal atrial fibrillation (H) 05/11/2017     Paroxysmal VT (H) 05/11/2017     Pulmonary cavitary lesion 11/18/2021     Rotator cuff tear arthropathy of both shoulders 11/02/2020     Type 2 diabetes mellitus with diabetic polyneuropathy (H) 3/18/2022     Uncomplicated asthma      Vitamin B12 deficiency (non anemic) 05/11/2017       Past Surgical History:   Procedure Laterality Date     ANESTHESIA CARDIOVERSION N/A 05/11/2020    Procedure: ANESTHESIA, FOR CARDIOVERSION @1100;  Surgeon: GENERIC ANESTHESIA PROVIDER;  Location: [facility] OR     BRONCHOSCOPY (RIGID OR FLEXIBLE), DIAGNOSTIC N/A 8/30/2021    Procedure: BRONCHOSCOPY, WITH BRONCHOALVEOLAR LAVAGE;  Surgeon: Perlman, David Morris, MD;  Location: [facility] GI     CV HEART BIOPSY N/A 5/13/2021    Procedure: Heart Biopsy;  Surgeon: Scout Robins MD;  Location:  HEART CARDIAC CATH LAB     CV HEART BIOPSY N/A 5/20/2021    Procedure: Heart Biopsy;  Surgeon: Jeffrey Gibson MD;  Location:  HEART CARDIAC CATH LAB     CV HEART BIOPSY N/A 5/27/2021    Procedure: Heart Biopsy;  Surgeon: Jac Dover MD;  [facility]     CV HEART BIOPSY N/A 6/7/2021    Procedure: CV HEART BIOPSY;  Surgeon: Jeffrey Gibson MD;  Location:  HEART CARDIAC CATH LAB     CV HEART BIOPSY N/A 6/21/2021    Procedure: CV HEART BIOPSY;  Surgeon: Julienne  MD Scout;  [facility]     CV HEART BIOPSY N/A 7/5/2021    Procedure: CV HEART BIOPSY;  Surgeon: Jeffrey Gibson MD;  [facility]     CV HEART BIOPSY N/A 7/16/2021    Procedure: Heart Biopsy;  Surgeon: Amadeo Art MD;  [facility]: [facility]     CV HEART BIOPSY N/A 8/5/2021    Procedure: Heart Biopsy;  Surgeon: Jeffrey Gibson MD;  [facility]     CV HEART BIOPSY N/A 8/31/2021    Procedure: Heart Cath Heart Biopsy;  Surgeon: Jeffrey Gibson MD;  [facility]     CV HEART BIOPSY N/A 9/21/2021    Procedure: CV HEART BIOPSY;  Surgeon: Jeffrey Gibson MD;  [facility]     CV HEART BIOPSY N/A 10/5/2021    Procedure: CV HEART BIOPSY;  Surgeon: Jeffrey Gibson MD;  [facility]     CV HEART BIOPSY N/A 11/4/2021    Procedure: CV HEART BIOPSY;  Surgeon: Nicola Seth MD;  Location:  HEART CARDIAC CATH LAB     CV RIGHT HEART CATH MEASUREMENTS RECORDED N/A 07/24/2019    Procedure: CV RIGHT HEART CATH;  Surgeon: Renu Sears MD;  Location:  HEART CARDIAC CATH LAB     CV RIGHT HEART CATH MEASUREMENTS RECORDED N/A 08/05/2020    Procedure: CV RIGHT HEART CATH;  Surgeon: Nicola Seth MD;  [facility]     CV RIGHT HEART CATH MEASUREMENTS RECORDED N/A 01/07/2021    Procedure: CV RIGHT HEART CATH;  Surgeon: Jac Dover MD;  [facility]     CV RIGHT HEART CATH MEASUREMENTS RECORDED N/A 02/23/2021    Procedure: Heart Cath Right Heart Cath;  Surgeon: Jeffrey Gibson MD;  Location:  HEART CARDIAC CATH LAB     CV RIGHT HEART CATH MEASUREMENTS RECORDED N/A 03/23/2021    Procedure: Heart Cath Right Heart Cath. request for 3/23;  Surgeon: Jeffrey Gibson MD;  Location:  HEART CARDIAC CATH LAB     CV  RIGHT HEART CATH MEASUREMENTS RECORDED N/A 5/13/2021    Procedure: Right Heart Cath;  Surgeon: Scout Robins MD;  Location:  HEART CARDIAC CATH LAB     CV RIGHT HEART CATH MEASUREMENTS RECORDED N/A 5/20/2021    Procedure: Right Heart Cath;  Surgeon: Jeffrey Gibson MD;  Location:  HEART CARDIAC CATH LAB     CV RIGHT HEART CATH MEASUREMENTS RECORDED N/A 5/27/2021    Procedure: Right Heart Cath;  Surgeon: Jac Dover MD;  Location:  HEART CARDIAC CATH LAB     CV RIGHT HEART CATH MEASUREMENTS RECORDED N/A 6/7/2021    Procedure: CV RIGHT HEART CATH;  Surgeon: Jeffrey Gibson MD;  Location:  HEART CARDIAC CATH LAB     CV RIGHT HEART CATH MEASUREMENTS RECORDED N/A 6/21/2021    Procedure: CV RIGHT HEART CATH;  Surgeon: Scout Robins MD;  Location:  HEART CARDIAC CATH LAB     CV RIGHT HEART CATH MEASUREMENTS RECORDED N/A 7/5/2021    Procedure: CV RIGHT HEART CATH;  Surgeon: Jeffrey Gibson MD;  Location:  HEART CARDIAC CATH LAB     CV RIGHT HEART CATH MEASUREMENTS RECORDED N/A 7/16/2021    Procedure: Right Heart Cath;  Surgeon: Amadeo Art MD;  Location:  HEART CARDIAC CATH LAB     CV RIGHT HEART CATH MEASUREMENTS RECORDED N/A 8/5/2021    Procedure: CV RIGHT HEART CATH;  Surgeon: Jeffrey Gibson MD;  Location:  HEART CARDIAC CATH LAB     CV RIGHT HEART CATH MEASUREMENTS RECORDED N/A 8/31/2021    Procedure: Heart Cath Right Heart Cath;  Surgeon: Jeffrey Gibson MD;  Location:  HEART CARDIAC CATH LAB     CV RIGHT HEART CATH MEASUREMENTS RECORDED N/A 9/21/2021    Procedure: CV RIGHT HEART CATH;  Surgeon: Jeffrey Gibson MD;  Location:  HEART CARDIAC CATH LAB     CV RIGHT HEART CATH MEASUREMENTS RECORDED N/A 10/5/2021    Procedure: CV RIGHT HEART CATH;  Surgeon: Jeffrey Gibson MD;  Location:  HEART CARDIAC CATH LAB     CV RIGHT HEART CATH MEASUREMENTS RECORDED N/A  11/4/2021    Procedure: CV RIGHT HEART CATH;  Surgeon: Nicola Seth MD;  [facility]     GI SURGERY  2003    [facility]     INSERT VENTRICULAR ASSIST DEVICE LEFT (HEARTMATE II) N/A 06/19/2017    Procedure: INSERT VENTRICULAR ASSIST DEVICE LEFT (HEARTMATE II);  Median Sternotomy Heartmate II Left Ventricular Assist Device Insertion on Pump Oxygenator;  Surgeon: Ronnie Quigley MD;  Location: [facility] OR     IR CHEST TUBE PLACEMENT NON-TUNNELED RIGHT  7/11/2021     ORTHOPEDIC SURGERY  1994    right knee wired     PICC DOUBLE LUMEN PLACEMENT Right 09/23/2020    5FR PICC DL. Length-43cm (1cm out).     PICC INSERTION - DOUBLE LUMEN Right 05/09/2021    IK/BRACH     RELEASE CARPAL TUNNEL BILATERAL Bilateral 02/18/2021    Procedure: Bilateral carpal tunnel release;  Surgeon: Jermaine Brand MD;  Location: [facility] OR     TRANSPLANT HEART RECIPIENT N/A 05/05/2021    Procedure: Redo median sternotomy, lysis of adhesions, heart transplant recipient, on cardiopulmonary bypass, intraoperative transesophageal echocardiogram per anesthesia, Implantable Cardioverter Defibrillator (ICD) removal;  Surgeon: Ronnie Quigley MD;  Location: [facility] OR       No current facility-administered medications on file prior to encounter.  acetaminophen (TYLENOL) 325 MG tablet, Take 3 tablets (975 mg) by mouth every 6 hours as needed for other (For optimal non-opioid multimodal pain management to improve pain control.)  albuterol (VENTOLIN HFA) 108 (90 Base) MCG/ACT inhaler, INHALE 2 PUFFS BY MOUTH EVERY 4 HOURS AS NEEDED. MAX OF 12 PUFFS PER 24 HOURS  allopurinol (ZYLOPRIM) 300 MG tablet, Take 1 tablet (300 mg) by mouth daily  anakinra (KINERET) 100 MG/0.67ML SOSY injection, Inject 0.67 mLs (100 mg) Subcutaneous daily As directed by rheumatology  aspirin (ASA) 81 MG chewable tablet, Take 1 tablet (81 mg) by mouth daily  blood glucose (NO BRAND SPECIFIED) test strip, Use to test blood sugar four times daily or as directed.  blood glucose (ONE  TOUCH DELICA) lancing device, Lancing device to be used with lancets.  blood glucose monitoring (ONE TOUCH ULTRA 2) meter device kit, Use to test blood sugar four times daily or as directed.  buPROPion (WELLBUTRIN) 75 MG tablet, Take 1 tablet (75 mg) by mouth 2 times daily  calcium citrate-vitamin D (CITRACAL) 315-250 MG-UNIT TABS per tablet, Take 1 tablet by mouth 2 times daily  docusate sodium (COLACE) 100 MG capsule, Take 1 capsule (100 mg) by mouth 2 times daily  Fluticasone-Umeclidin-Vilanterol (TRELEGY ELLIPTA) 100-62.5-25 MCG/INH oral inhaler, Inhale 1 puff into the lungs daily  insulin pen needle (32G X 4 MM) 32G X 4 MM miscellaneous, Use four pen needles daily or as directed.  Melatonin 10 MG CAPS, Take 1 capsule by mouth At Bedtime  montelukast (SINGULAIR) 10 MG tablet, TAKE 1 TABLET(10 MG) BY MOUTH AT BEDTIME  mycophenolate (GENERIC EQUIVALENT) 250 MG capsule, TAKE 2 CAPSULES(500 MG) BY MOUTH TWICE DAILY  rosuvastatin (CRESTOR) 10 MG tablet, Take 1 tablet (10 mg) by mouth daily        Family History   Problem Relation Age of Onset     Cerebrovascular Disease Mother 64     Diabetes Mother      Hypertension Mother      Coronary Artery Disease Father      Diabetes Type 2  Father      Obesity Brother      Obesity Brother      Cerebrovascular Disease Daughter 40       Social History     Tobacco Use     Smoking status: Former Smoker     Quit date:      Years since quittin.3     Smokeless tobacco: Never Used   Substance Use Topics     Alcohol use: No       Allergies   Allergen Reactions     Grass Shortness Of Breath     Ace Inhibitors Cough     Cats      Dust Mites Other (See Comments)     Asthma     Mold Other (See Comments)     Asthma     Penicillins Other (See Comments)     Unknown - childhood exposure    Tolerated Zosyn -2020    Per patient report he has tolerated amoxicillin     Sulfa Drugs Other (See Comments) and Unknown     Unknown childhood reaction         ROS:   All systems reviewed  and negative unless documented in HPI.     Physical Examination:  Vitals: There were no vitals taken for this visit.  BMI= There is no height or weight on file to calculate BMI.    GENERAL APPEARANCE: Pleasant and well appearing elderly male. Appears comfortable and in no acute distress. Alert and interactive.   HEENT: NCAT. Sclera clear.   NECK: JVP not elevated  CHEST: Normal work of breathing on room air without use of accessory muscles, no retractions. Lungs clear to auscultation without rales, rhonchi or wheezes,  CARDIOVASCULAR: regular rate and rhythm, normal S1 and S2, no S3 or S4 and no murmur, click or rub. Radial and pedal pulses palpable bilaterally.   EXTREMITIES: warm, no lower extremity edema, no clubbing or cyanosis, warm and well perfused  NEURO: alert and oriented to person/place/time, normal speech, moves all extremities  PSYCH: mood and affect appropriate  SKIN: no ecchymoses, no rashes, warm and dry to touch      Laboratory:  CMP  Recent Labs   Lab 05/17/22  0746      POTASSIUM 4.0   CHLORIDE 110*   CO2 20   ANIONGAP 8   *   BUN 32*   CR 1.76*   GFRESTIMATED 43*   QAMAR 8.8   MAG 1.9   PHOS 3.5   PROTTOTAL 7.2   ALBUMIN 3.5   BILITOTAL 0.7   ALKPHOS 141   AST 18   ALT 24     CBC  Recent Labs   Lab 05/17/22  0746   WBC 6.6   RBC 3.87*   HGB 11.3*   HCT 36.4*   MCV 94   MCH 29.2   MCHC 31.0*   RDW 13.2          EKG 5/17/22:     TTE 3/16/22:  Interpretation Summary  Global and regional left ventricular function is hyperkinetic with an EF >70%.  Right ventricular function, chamber size, wall motion, and thickness are  normal.  Pulmonary artery systolic pressure is normal.  The inferior vena cava is normal.  No pericardial effusion is present.  No significant changes noted.

## 2022-05-17 NOTE — PRE-PROCEDURE
GENERAL PRE-PROCEDURE:   Procedure:  Coronary angiogram, RCH, heart biopsy  Date/Time:  5/17/2022 8:56 AM    Verbal consent obtained?: Yes    Written consent obtained?: Yes    Risks and benefits: Risks, benefits and alternatives were discussed    DC Plan: Appropriate discharge home plan in place for patients who are going home after procedure   Consent given by:  Patient  Patient states understanding of procedure being performed: Yes    Patient's understanding of procedure matches consent: Yes    Procedure consent matches procedure scheduled: Yes    Appropriately NPO:  Yes  Mallampati  :  Grade 2- soft palate, base of uvula, tonsillar pillars, and portion of posterior pharyngeal wall visible  Lungs:  Lungs clear with good breath sounds bilaterally  Heart:  Normal heart sounds and rate  History & Physical reviewed:  History and physical reviewed and no updates needed  Statement of review:  I have reviewed the lab findings, diagnostic data, medications, and the plan for sedation

## 2022-05-17 NOTE — PROGRESS NOTES
Patient tolerated recovery stage well. VSS, right wrist and right neck site clean/dry/intact, no hematoma, and denies pain. Patient tolerated PO food and fluids. Teaching was done and discharge instructions were given. Patient ambulated, voided, and PIV was removed. Patient discharged from the hospital via wheel chair to home with family.

## 2022-05-17 NOTE — PROGRESS NOTES
Pt arrived to unit for CORS/RHC. Prep completed, IV placed, NS gtt infusing, 325mg aspirin given, groin site prepped, pedal pulses +2. Wife, Cate, will provide transportation post procedure.

## 2022-05-17 NOTE — Clinical Note
dry, intact, no bleeding and no hematoma. 7Fr sheath pulled from RIJ, manual pressure held, primapore at site. 6Fr sheath removed from RRA. TR band in place. 14cc in balloon.

## 2022-05-17 NOTE — PROGRESS NOTES
Arrived to 3C following CCL procedure. Food and fluid provided. Plan of care discussed. No active pt complaints.

## 2022-05-17 NOTE — TELEPHONE ENCOUNTER
Pt called stating unsure if insurance is all straightened out for upcoming procedures. After discussing with Suyapa Whipple, sounds like patient is ok to proceed with procedures.

## 2022-05-18 LAB
ATRIAL RATE - MUSE: 100 BPM
DIASTOLIC BLOOD PRESSURE - MUSE: NORMAL MMHG
DONOR IDENTIFICATION: NORMAL
DSA COMMENTS: NORMAL
DSA PRESENT: NO
DSA TEST METHOD: NORMAL
EBV DNA COPIES/ML, INSTRUMENT: 2596 COPIES/ML
EBV DNA SPEC NAA+PROBE-LOG#: 3.4 {LOG_COPIES}/ML
INTERPRETATION ECG - MUSE: NORMAL
ORGAN: NORMAL
P AXIS - MUSE: 46 DEGREES
PATH REPORT.COMMENTS IMP SPEC: NORMAL
PATH REPORT.COMMENTS IMP SPEC: NORMAL
PATH REPORT.FINAL DX SPEC: NORMAL
PATH REPORT.GROSS SPEC: NORMAL
PATH REPORT.MICROSCOPIC SPEC OTHER STN: NORMAL
PATH REPORT.RELEVANT HX SPEC: NORMAL
PHOTO IMAGE: NORMAL
PR INTERVAL - MUSE: 170 MS
QRS DURATION - MUSE: 98 MS
QT - MUSE: 354 MS
QTC - MUSE: 456 MS
R AXIS - MUSE: 65 DEGREES
SA 1 CELL: NORMAL
SA 1 TEST METHOD: NORMAL
SA 2 CELL: NORMAL
SA 2 TEST METHOD: NORMAL
SA1 HI RISK ABY: NORMAL
SA1 MOD RISK ABY: NORMAL
SA2 HI RISK ABY: NORMAL
SA2 MOD RISK ABY: NORMAL
SYSTOLIC BLOOD PRESSURE - MUSE: NORMAL MMHG
T AXIS - MUSE: 41 DEGREES
UNACCEPTABLE ANTIGENS: NORMAL
UNOS CPRA: 0
VENTRICULAR RATE- MUSE: 100 BPM
ZZZSA 1  COMMENTS: NORMAL
ZZZSA 2 COMMENTS: NORMAL

## 2022-05-19 LAB
ALLOMAP SCORE (EXTERNAL): 30 % (ref 0–40)
IMMUKNOW IMMUNE CELL FUNCTION: 348 NG/ML
NEGATIVE PREDICTIVE VALUE PERCENT (EXTERNAL): 98.7 %
POSITIVE PREDICTIVE VALUE PERCENT (EXTERNAL): 2.1 %

## 2022-05-24 ENCOUNTER — OFFICE VISIT (OUTPATIENT)
Dept: CARDIOLOGY | Facility: CLINIC | Age: 67
End: 2022-05-24
Attending: NURSE PRACTITIONER
Payer: COMMERCIAL

## 2022-05-24 VITALS
HEART RATE: 109 BPM | BODY MASS INDEX: 30.59 KG/M2 | OXYGEN SATURATION: 99 % | HEIGHT: 69 IN | WEIGHT: 206.5 LBS | DIASTOLIC BLOOD PRESSURE: 79 MMHG | SYSTOLIC BLOOD PRESSURE: 115 MMHG

## 2022-05-24 DIAGNOSIS — Z94.1 TRANSPLANTED HEART (H): Primary | ICD-10-CM

## 2022-05-24 PROCEDURE — G0463 HOSPITAL OUTPT CLINIC VISIT: HCPCS

## 2022-05-24 PROCEDURE — M0220 HC INJECTION TIXAGEVIMAB & CILGAVIMAB (EVUSHELD): HCPCS | Performed by: NURSE PRACTITIONER

## 2022-05-24 PROCEDURE — 250N000011 HC RX IP 250 OP 636: Performed by: NURSE PRACTITIONER

## 2022-05-24 PROCEDURE — 99215 OFFICE O/P EST HI 40 MIN: CPT | Performed by: NURSE PRACTITIONER

## 2022-05-24 PROCEDURE — 96372 THER/PROPH/DIAG INJ SC/IM: CPT | Performed by: NURSE PRACTITIONER

## 2022-05-24 RX ADMIN — AZD7442 6 ML: KIT at 08:08

## 2022-05-24 ASSESSMENT — PAIN SCALES - GENERAL: PAINLEVEL: NO PAIN (0)

## 2022-05-24 NOTE — PROGRESS NOTES
[facility]  May 24, 2022    HPI:   Mr. Jim Willingham is a 66yr old male with a history of NICM (s/p HM2 LVAD 6/2017) s/p OHT 5/6/21, VT, AFib, CKD, DMII, and COPD who presents to clinic for routine surveillance.    His post-transplant course has been c/b right pleural air leak (required prolonged chest tube), pAFib, CAP (RLL, 6/2021), recurrent pleural effusions (s/p thoracenteses x2 6/2021 and 7/2021, exudative, repeatedly cultured negative), RLL cavitary lesions (per chest CT 7/14/21, BD glucan indeterminate, aspergillus negative, not started on antifungals), pericardial effusion (1.4cm per TTE 7/12/21, conservatively managed), low-grade EBV viremia, recurrent/persistent gout flare, transaminase elevation with questionable choledocholithiasis (conservatively managed with resolution), COVID-19 (8/2021, s/p monoclonal antibodies and remdesivir x5 days), and 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     Rejection history:  none  AlloMap scores:  < 35 recently  DSAs:  none  Coronary angio/Ischemic eval:  Routine post-transplant baseline cor angio has been deferred due to renal dysfunction --->  Note that per Dr. Montgomery, may defer until his baseline as he had a young donor.  Last RHC:  11/4/21 showed mildly normal biventricular filling pressures with RA 4, mPA 18, PCW 10, and CI 3.53.  Echo/cMRI:  TTE 11/2021 showed stable graft function, with LVEF >70% and normal RV size/function.    Since his last visit, he states that he feels well.  He is walking regularly, and does not get SOB.  He has been sleeping well, and is able to lie flat without PND and orthopnea.  His appetite has been good, and he is eating regularly without nausea, vomiting, diarrhea, and constipation.  His weight has been stable overall, ranging 193-197# usually, but was up to 200# today, which he attributes to diet.  He does not feel any fluid retention today.  His BPs have remained stable, ranging  110/70-80s.  He has had an ongoing cold, which he attributes to allergies.  He started taking claritin, which has helped.  He otherwise denies chest pain, palpitations, dizziness, falls, new headaches, acute vision changes, fevers, chills, cough, sore throat, and signs of bleeding.    Serostatus:  CMV D+/R+  EBV D+/R+  Toxo D-/R-    Patient Active Problem List    Diagnosis Date Noted     Status post coronary angiogram 05/17/2022     Priority: Medium     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     Priority: Medium     Type 2 diabetes mellitus with diabetic polyneuropathy (H) 03/18/2022     Priority: Medium     Mild anemia 03/18/2022     Priority: Medium     Hx of aspergillosis 12/16/2021     Priority: Medium     Expressive aphasia 12/15/2021     Priority: Medium     Hospital-acquired pneumonia 12/15/2021     Priority: Medium     Encounter for monitoring tacrolimus therapy 12/11/2021     Priority: Medium     Pulmonary cavitary lesion 11/18/2021     Priority: Medium     Generalized muscle weakness 07/08/2021     Priority: Medium     Heart replaced by transplant (H) 06/28/2021     Priority: Medium     Added automatically from request for surgery [id number]       Abnormal CT scan of lung 05/25/2021     Priority: Medium     Need for prophylactic antibiotic 05/12/2021     Priority: Medium     S/P orthotopic heart transplant (H) 02/05/2021     Priority: Medium     Added automatically from request for surgery [id number]       Bilateral carpal tunnel syndrome 11/02/2020     Priority: Medium     Rotator cuff tear arthropathy of both shoulders 11/02/2020     Priority: Medium     COPD (chronic obstructive pulmonary disease) (H) 11/02/2020     Priority: Medium     Major depression, recurrent, chronic (H) 11/02/2020     Priority: Medium     Gouty arthropathy, chronic, without tophi 11/02/2020     Priority: Medium     Iron deficiency anemia 07/31/2018     Priority: Medium     Chronic systolic congestive heart failure (H) 07/10/2018      Priority: Medium     Physical deconditioning 06/30/2017     Priority: Medium     Type 2 diabetes mellitus with complication, with long-term current use of insulin (H) 05/11/2017     Priority: Medium     Stage 3b chronic kidney disease (H) 05/11/2017     Priority: Medium     H/O gastric bypass 05/11/2017     Priority: Medium     CECILIA (obstructive sleep apnea) 05/11/2017     Priority: Medium     Vitamin B12 deficiency (non anemic) 05/11/2017     Priority: Medium     NICM (nonischemic cardiomyopathy) (H)/ EF 20% 05/11/2017     Priority: Medium     ECHO: LVEDd. 7.66 cm, Restrictive pattern , Severe mitral valve regurgitation         PAST MEDICAL HISTORY:  Past Medical History:   Diagnosis Date     Bariatric surgery status 2003     Benign essential hypertension 05/11/2017     Bilateral carpal tunnel syndrome 11/02/2020     Cardiomyopathy, unspecified (H) 05/08/2017     CKD (chronic kidney disease) stage 3, GFR 30-59 ml/min (H) 05/11/2017     COPD (chronic obstructive pulmonary disease) (H) 11/02/2020     Depression 05/11/2017     Diabetes mellitus (H) 1995     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 7.66 cm, Restrictive pattern , Severe mitral valve regurgitation     CECILIA (obstructive sleep apnea) 05/11/2017     Paroxysmal atrial fibrillation (H) 05/11/2017     Paroxysmal VT (H) 05/11/2017     Pulmonary cavitary lesion 11/18/2021     Rotator cuff tear arthropathy of both shoulders 11/02/2020     Type 2 diabetes mellitus with diabetic polyneuropathy (H) 3/18/2022     Uncomplicated asthma      Vitamin B12 deficiency (non anemic) 05/11/2017        CURRENT MEDICATIONS:  Prescription Medications as of 5/24/2022       Rx Number Disp Refills Start End Last Dispensed Date Next Fill Date Owning Pharmacy    acetaminophen (TYLENOL) 325 MG tablet  100 tablet 1 7/5/2021    [address]    Sig: Take 3 tablets (975 mg) by mouth every 6 hours as needed for other (For optimal non-opioid multimodal pain management to improve pain control.)    Class: E-Prescribe    Route: Oral    albuterol (VENTOLIN HFA) 108 (90 Base) MCG/ACT inhaler  18 g 11 11/18/2021    [id number] [place] [phone] AT [facility]  [address]    Sig: INHALE 2 PUFFS BY MOUTH EVERY 4 HOURS AS NEEDED. MAX OF 12 PUFFS PER 24 HOURS    Class: E-Prescribe    Notes to Pharmacy: Pharmacy may dispense brand covered by insurance (Proair, or proventil or ventolin or generic albuterol inhaler)    allopurinol (ZYLOPRIM) 300 MG tablet  90 tablet 1 11/18/2021    [id number] [address] AT [facility]  [address]    Sig: Take 1 tablet (300 mg) by mouth daily    Class: E-Prescribe    Route: Oral    anakinra (KINERET) 100 MG/0.67ML SOSY injection  20.1 mL 6 1/10/2022 2/9/2022   [facility] [address]    Sig: Inject 0.67 mLs (100 mg) Subcutaneous daily As directed by rheumatology    Class: E-Prescribe    Route: Subcutaneous    aspirin (ASA) 81 MG chewable tablet  100 tablet 1 6/1/2021    [address]    Sig: Take 1 tablet (81 mg) by mouth daily    Class: E-Prescribe    Route: Oral    blood glucose (NO BRAND SPECIFIED) test strip  200 strip 3 8/16/2021    [id number] [address] AT [facility]  [address]    Sig: Use to test blood sugar four times daily or as directed.    Class: E-Prescribe    blood glucose (ONE TOUCH DELICA) lancing device  1  each 0 7/7/2021    [id number] [place] [address] [facility] [address]    Sig: Lancing device to be used with lancets.    Class: E-Prescribe    blood glucose monitoring (ONE TOUCH ULTRA 2) meter device kit  1 kit 0 1/24/2020    [facility] #[id number] [place] [address] [facility] [address]    Sig: Use to test blood sugar four times daily or as directed.    Class: E-Prescribe    buPROPion (WELLBUTRIN) 75 MG tablet  180 tablet 1 11/18/2021    [facility] #[address] [place] [address] [facility] [address]    Sig: Take 1 tablet (75 mg) by mouth 2 times daily    Class: E-Prescribe    Route: Oral    calcium citrate-vitamin D (CITRACAL) 315-250 MG-UNIT TABS per tablet  180 tablet 3 11/18/2021    [facility] [address] [place] [address] [facility] [address]    Sig: Take 1 tablet by mouth 2 times daily    Class: E-Prescribe    Route: Oral    docusate sodium (COLACE) 100 MG capsule  60 capsule 3 12/17/2021    [place], [place] - [address] [place] [address]    Sig: Take 1 capsule (100 mg) by mouth 2 times daily    Class: E-Prescribe    Route: Oral    Fluticasone-Umeclidin-Vilanterol (TRELEGY ELLIPTA) 100-62.5-25 MCG/INH oral inhaler  1 each 11 11/18/2021    [facility] #[id number] [facility] [address] [facility] [address]    Sig: Inhale 1 puff into the lungs daily    Class: E-Prescribe    Route: Inhalation    gabapentin (NEURONTIN) 300 MG capsule  60 capsule 0 5/16/2022    [facility] #[id number] [place] [id number] [facility] [address]    Sig: Take 1 capsule (300 mg) by mouth 2 times daily    Class: E-Prescribe    Route: Oral    insulin pen needle (32G X 4 MM) 32G X 4 MM miscellaneous  110 each 0 1/24/2020    [facility] #[id number] - [place], [place] - [address] AT [facility]  [address]    Sig: Use four pen needles  daily or as directed.    Class: E-Prescribe    Melatonin 10 MG CAPS            Sig: Take 1 capsule by mouth At Bedtime    Class: Historical    Route: Oral    montelukast (SINGULAIR) 10 MG tablet  90 tablet 1 1/3/2022    [id number] [address] AT [facility]  [address]    Sig: TAKE 1 TABLET(10 MG) BY MOUTH AT BEDTIME    Class: E-Prescribe    mycophenolate (GENERIC EQUIVALENT) 250 MG capsule  360 capsule 3 2/25/2022    [facility] [place] [address] [facility]    Sig: TAKE 2 CAPSULES(500 MG) BY MOUTH TWICE DAILY    Class: E-Prescribe    Notes to Pharmacy: TXP DT 5/6/2021 (Heart) TXP Dischg DT 5/31/2021 DX Heart replaced by transplant Z94.1 [facility] ([place], [place])    rosuvastatin (CRESTOR) 10 MG tablet  90 tablet 3 12/18/2021    [facility], [place] - [address] [place] [address]    Sig: Take 1 tablet (10 mg) by mouth daily    Class: E-Prescribe    Route: Oral    tacrolimus (GENERIC EQUIVALENT) 0.5 MG capsule  30 capsule 11 5/7/2022    [facility] [address] [place] [address]    Sig: Take ONE cap with FOUR 1 mg caps (4.5 mg) every AM    Class: E-Prescribe    Notes to Pharmacy: TXP DT 5/6/2021 (Heart) TXP Dischg DT 5/31/2021 DX Heart replaced by transplant Z94.1 [facility] ([place], [place])    tacrolimus (GENERIC EQUIVALENT) 1 MG capsule  240 capsule 11 5/7/2022    [facility] [address] [place] [address] [address] [facility]    Sig: Take FOUR caps with ONE 0.5 mg cap (4.5 mg) every AM and FOUR caps (4 mg) every PM    Class: E-Prescribe    Notes to Pharmacy: TXP DT 5/6/2021 (Heart) TXP Dischg DT 5/31/2021 DX Heart replaced by transplant Z94.1 [facility] ([place], [place])    traMADol (ULTRAM) 50 MG tablet  120 tablet 3 3/18/2022    [facility][id number]  [facility] [address] AT [facility]  [address]    Sig: One tablet in am, one table in afternoon and two tablet at bedtime    Class: E-Prescribe          ROS:   Constitutional: No fever, chills, or sweats. Weight overall stable.   ENT: No visual disturbance, ear ache, epistaxis, sore throat.   Allergies/Immunologic: Negative.   Respiratory: As per HPI.   Cardiovascular: As per HPI.   GI: No nausea, vomiting, hematemesis, melena, or hematochezia.   : No urinary frequency, dysuria, or hematuria.   Integument: Negative.   Psychiatric: Negative.   Neuro: Negative.   Endocrinology: Negative.   Musculoskeletal: Negative.    Exam:  /79 (BP Location: Right arm, Patient Position: Chair, Cuff Size: Adult Regular)   Pulse 109   Ht 1.755 m (5' 9.09\")   Wt 93.7 kg (206 lb 8 oz)   SpO2 99%   BMI 30.41 kg/m    In general, the patient is a pleasant male in no apparent distress.    HEENT: NC/AT. RICHARD. EOMI.  Sclerae white, not injected.    Neck:  No adenopathy, No thyromegaly.    COR: No jugular venous distention when sitting upright.  RRR.  Normal S1 S2 splits physiologically.  No murmur, rub click, or gallop.    Lungs:  CTA. No rhonchi.    Abdomen: soft, nontender, nondistended.  No organomegaly.  Extremities:  No clubbing, cyanosis, or LE edema.    Neuro: Alert & Oriented x 3, grossly non focal.  Integument: no open lesions, rashes, or jaundice.    Labs:  CBC RESULTS:   Lab Results   Component Value Date    WBC 6.6 05/17/2022    WBC 7.8 07/11/2021    RBC 3.87 (L) 05/17/2022    RBC 3.06 (L) 07/11/2021    HGB 11.3 (L) 05/17/2022    HGB 11.3 (L) 05/17/2022    HGB 8.7 (L) 07/11/2021    HCT 36.4 (L) 05/17/2022    HCT 28.4 (L) 07/11/2021    MCV 94 05/17/2022    MCV 93 07/11/2021    MCH 29.2 05/17/2022    MCH 28.4 07/11/2021    MCHC 31.0 (L) 05/17/2022    MCHC 30.6 (L) 07/11/2021    RDW 13.2 05/17/2022    RDW 15.6 (H) 07/11/2021     05/17/2022     07/11/2021       BMP RESULTS:  Lab Results "   Component Value Date     05/17/2022     (L) 07/11/2021    POTASSIUM 4.0 05/17/2022    POTASSIUM 5.1 07/11/2021    CHLORIDE 110 (H) 05/17/2022    CHLORIDE 104 07/11/2021    CO2 20 05/17/2022    CO2 23 07/11/2021    ANIONGAP 8 05/17/2022    ANIONGAP 6 07/11/2021     (H) 05/17/2022     (H) 07/11/2021    BUN 32 (H) 05/17/2022    BUN 49 (H) 07/11/2021    CR 1.76 (H) 05/17/2022    CR 2.75 (H) 07/11/2021    GFRESTIMATED 43 (L) 05/17/2022    GFRESTIMATED 42 (L) 12/15/2021    GFRESTIMATED 23 (L) 07/11/2021    GFRESTBLACK 27 (L) 07/11/2021    QAMAR 8.8 05/17/2022    QAMAR 7.9 (L) 07/11/2021      LIPID RESULTS:  Lab Results   Component Value Date    CHOL 116 05/17/2022    CHOL 209 (H) 11/05/2020    HDL 61 05/17/2022    HDL 87 11/05/2020    LDL 42 05/17/2022     (H) 11/05/2020    TRIG 64 05/17/2022    TRIG 91 11/05/2020    NHDL 55 05/17/2022    NHDL 122 11/05/2020       IMMUNOSUPPRESSANT LEVELS  Lab Results   Component Value Date    TACROL 4.9 (L) 05/17/2022    TACROL 11.4 07/11/2021    DOSTAC 5/17/2022 05/17/2022    DOSTAC Not Provided 07/11/2021       No components found for: CK  Lab Results   Component Value Date    MAG 1.9 05/17/2022    MAG 1.8 07/11/2021     Lab Results   Component Value Date    A1C 5.5 05/17/2022    A1C 5.1 05/04/2021     Lab Results   Component Value Date    PHOS 3.5 05/17/2022    PHOS 2.8 06/30/2021     Lab Results   Component Value Date    NTBNPI 3,277 (H) 08/24/2021    NTBNPI 1,697 (H) 06/28/2021     Lab Results   Component Value Date    SAITESTMET SA FCS 05/17/2022    SAITESTMET SA FCS 06/07/2021    SAICELL Class I 05/17/2022    SAICELL Class I 06/07/2021    [id number] Cw:12 05/17/2022    CE9SVILGI Cw:12 06/07/2021    [id number] None 05/17/2022    [id number] None 06/07/2021    SAIREPCOM  05/17/2022      Test performed by modified procedure. Serum heat inactivated and tested by a modified ([place]) protocol including fetal calf serum addition. High-risk, mfi >3,000.  Mod-risk, mfi 500-3,000.    SAIREPCOM  06/07/2021      Test performed by modified procedure. Serum heat inactivated and tested   by a modified ([place]) protocol including fetal calf serum addition.   High-risk, mfi >3,000. Mod-risk, mfi 500-3,000.       Lab Results   Component Value Date    SAIITESTME SA FCS 05/17/2022    SAIITESTME SA [facility] 06/07/2021    SAIICELL Class II 05/17/2022    SAIICELL Class II 06/07/2021    [id number] None 05/17/2022    [id number] None 06/07/2021    [id number] None 05/17/2022    [id number] None 06/07/2021    SAIIREPCOM  05/17/2022      Test performed by modified procedure. Serum heat inactivated and tested by a modified ([place]) protocol including fetal calf serum addition. High-risk, mfi >3,000. Mod-risk, mfi 500-3,000.    SAIIREPCOM  06/07/2021      Test performed by modified procedure. Serum heat inactivated and tested   by a modified ([place]) protocol including fetal calf serum addition.   High-risk, mfi >3,000. Mod-risk, mfi 500-3,000.       Lab Results   Component Value Date    CSPEC Plasma 06/29/2021       Assessment and Plan:  Mr. Jim Willingham is a 66yr old male with a history of NICM (s/p HM2 LVAD 6/2017) s/p OHT 5/6/21, VT, AFib, CKD, DMII, and COPD who presents to clinic for routine surveillance.    Labs from 5/17/22 showed stable electrolytes, improved renal function, stable liver function, thyroid function, cholesterol panel, and blood counts.  HgbA1c 5.5%, PSA 0.5.  CMV negative, EBV stable.  No DSAs, AlloMap 30, and tacro level 4.9.  Biopsy was negative for rejection.    CXR was negative for acute changes.    Coronary angiogram showed minimal, nonobstructive CAV (20% stenosis in the ostial RCA to prox RCA), and RHC showed normal biventricular filling pressures with RA 5, mPA 22, PCW 9, CI 2.9.    He will have a cMRI in the coming days.    Mr. Willingham appears well.  His BPs are controlled.  His weight trend is overall stable, and he appears euvolemic today and per RHC last  week.    Given the CAV seen on his angio and his ongoing renal dysfunction, will plan to review ongoing IMS plan with Dr. Montgomery.  Informed him that if we were to make any changes, that he would need a biopsy 1 month after the change.    We will plan for him to follow-up per protocol, or sooner should new concerns arise.      NICM, s/p OHT 5/6/21  His post-transplant course has been c/b right pleural air leak (required prolonged chest tube), pAFib, CAP (RLL, 6/2021), recurrent pleural effusions (s/p thoracenteses x2 6/2021 and 7/2021, exudative, repeatedly cultured negative), RLL cavitary lesions (per chest CT 7/14/21, BD glucan indeterminate, aspergillus negative, not started on antifungals), pericardial effusion (1.4cm per TTE 7/12/21, conservatively managed), low-grade EBV viremia, recurrent/persistent gout flare, transaminase elevation with questionable choledocholithiasis (conservatively managed with resolution), COVID-19 (8/2021, s/p monoclonal antibodies and remdesivir x5 days), and 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     Rejection history:  none  AlloMap scores:  < 35 recently  DSAs:  none  Coronary angio/Ischemic eval:  Routine post-transplant baseline cor angio was deferred due to renal dysfunction.  Cor angio from last week as noted above.  Last RHC:  as above  Echo/cMRI:  TTE 11/2021 showed stable graft function, with LVEF >70% and normal RV size/function.    Serostatus:  - CMV D+/R+  - EBV D+/R+  - Toxo D-/R-     Immunosuppression:  - MMF 500mg twice daily (dose decreased due to multiple pneumonias)  - tacro, goal level 4-6.  Tacro level last week was 4.9.  Given renal dysfunction and mild CAV, will review ongoing tacro goal and IMS plan with Dr. Montgomery.     PPx:  - CAV:  Aspirin 81mg daily and rosuvastatin 10mg daily.  - GI: Pantoprazole 40mg daily  - Osteoporosis: Calcium/vitamin D supplements     Graft function:  - BPs: Controlled, not on antihypertensives  -  fluid status: Euvolemic, not on diuretics    Health Maintenance:  - derm exam scheduled for tomorrow  - eye exam overdue, going to schedule, last was roughly 1 year ago  - dental exam overdue  - covid vaccinations -- will get 4th on 6/8, has not gotten Evushield (will see if he can get this today)  - flu shot 2021  - needs shingrix -- discussed waiting at least 2 weeks after COVID vaccine  - pneumonia shots unclear, advised that he d/w PCP     Septic encephalopathy, resolved  Expressive aphasia, resolved  Presented 12/15/21 with expressive aphasia/word garbling with complete understanding of others. Wife reports not noticing this occurring prior to day of admission. Patient denied headaches, loss of consciousness, or vision changes. Likely septic encephalopathy given WBC count (17.3).  - neuro consulted  - CT head 12/15- negative for ischemia or acute infarction  - CTA head and neck w/ contrast 12/15 - no aneurysms or stenosis of major intracranial/cervical arteries  - Groundglass opacities found on CTA and chest XR, likely infection.   - MRI brain 1/16 without acute findings, no c/f PRES  - urine drug screen and ethanol negative     Sepsis due to right lung PNA  WBC count 17.3 on admission, Chest CT completed 12/15 upon admission shows groundglass opacities and consolidative opacities throughout right lung, concerning for infection. WBC decreased to 11.4 after one day of IV zosyn and 7.6 on day of discharge. Transplant ID consulted. Zosyn 4.5g IV [id number] changed to doxycycline 100 mg bid x 3 weeks per transplant ID (through 1/5).     KALI cavitary lesion  +Aspergillus (9/2021)  S/p 2 months of voriconazole, follows with Transplant ID.     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  He remains asymptomatic.  Level stable.     Gout/pseudogout  Predated transplant, has restricted mobility.  Has had recurrent flares following transplant that have required steroid bursts and Anakinra treatment.  He continues to  follow with rheumatology.  Continue allopurinol and anakinra as needed -- note no recent flares, no recent anakinra.  Needs to follow-up with rheum.     CKD  Creatinine has typically ranged 1.5-1.9, remains stable.  He appears euvolemic.  He recently restarted leah.  Will discuss ongoing plan for tacro/immunosuppression with Dr. Montgomery.     Recurrent exudative pleural effusions (6/2021, 7/2021), resolved.   DMII:  management per PCP/endo  Depression:  Wellbutrin 75mg BID  COPD:  singulair, Trelegy Ellipta, Albuterol, follows with pulmonary.  COVID-19 infection: s/p monoclonal antibodies and remdesivir (8/2021).           The above was reviewed with Mr. Willingham, who verbalized understanding and will call with further questions/concerns.     45 minutes spent with patient, along with preparing for visit, reviewing follow up, and completing documentation.        Shelia Means, JOSE, FNP-BC, CHFN  Advanced Heart Failure Nurse Practitioner  [facility]  Patient Care Team:  Ricardo Gómez MD as PCP - General (Internal Medicine)  Elfego Hayden MD as MD (Internal Medicine)  Delisa Montgomery MD as Referring Physician (Cardiology)  Chase Qureshi MD as MD (Internal Medicine-Hematology & Oncology)  Kadie Pedro LICSW as  ( - Clinical)  Delisa Montgomery MD as Assigned Heart and Vascular Provider  Elbert Pettit MD as Assigned Rheumatology Provider  Forest Gonzalez MD as Assigned Palliative Care Provider  Nba Ag DO as Assigned Musculoskeletal Provider  Yolette Rueda RN as Transplant Coordinator  Akshat Parkinson [facility] as Pharmacist (Pharmacist)  Marcos Washington MD as MD (Infectious Diseases)  Lyle Sarmiento DO as MD (Nephrology)  Anisa Quiñones MD as Assigned Infectious Disease Provider  Ricardo Gómez MD as Assigned PCP  Sheyla Fletcher MD as Assigned Sleep Provider  Corey Melendrez  MD Nba as MD (Dermatology)  Delisa Montgomery MD as Referring Physician (Cardiovascular Disease)  HIEU FLORIAN

## 2022-05-24 NOTE — PATIENT INSTRUCTIONS
Patient Instructions  1. I will order evusheld for you to receive today. I will double check if its ok to get your covid vaccine on 6/8.   2. Continue claritin  3. Wait until after [place] to get your shingles shot.   4. We will put a referral in again for nephrology (kidney) team.   5. No changes with your tacro dosing.   6. Enjoy Tabatha!!!    Next transplant clinic appointment:  16 month appointment  Next lab draw: at next appointments.  Coordinator will call with all pending results.     Please call transplant coordinator with any questions:    Yolette Rueda RN BSN   Post Heart Transplant Nurse Coordinator  [facility]  Questions: [phone]

## 2022-05-24 NOTE — NURSING NOTE
Transplant Coordinator Note    Reason for visit: 1st annual w/ angio/rhc, Cxr, labs   Cardiac MRI 5/31.   Coordinator: Present       Health concerns addressed today:  1. Rheum, derm, nephrology follow up?  2. Joint pains.   3. Sob better? Yes. Allergies discussed. Cont claritin.   4. Weight up a bit. Not feeling       Immunosuppressants:  Tacro goal 4-6. Current dose 4.5/4.       bid     No DSAS  Baseline angiogram?    Immuknow [id number]     Routine screenings:    Derm: 5/25/22  Dental: Due  Colonoscopy: Last  8/2021  Prostate: 0.35   Eye: Due  Flu/Pneumonia: up to date   Covid: 2/3, 3/11.  4th on 6/8/22.   Shingles: Due      Rhc/angio, cxr, and resulted labs reviewed with patient  Medication record reviewed and reconciled  Questions and concerns addressed  Pt verbalized an understanding of plan of care.     Patient Instructions  1. I will order evusheld for you to receive today. I will double check if its ok to get your covid vaccine on 6/8.   2. Continue claritin  3. Wait until after [place] to get your shingles shot.   4. We will put a referral in again for nephrology (kidney) team.   5. No changes with your tacro dosing.   6. Enjoy Tabatha!!!    Next transplant clinic appointment:  16 month appointment  Next lab draw: at next appointments.  Coordinator will call with all pending results.     Please call transplant coordinator with any questions:    Yolette Rueda RN BSN   Post Heart Transplant Nurse Coordinator  [facility]  Questions: [phone]

## 2022-05-24 NOTE — NURSING NOTE
Chief Complaint   Patient presents with     Follow Up     Return heart transplant     Vitals were taken and medications reconciled.    CAMRYN Garrett  7:06 AM

## 2022-05-24 NOTE — LETTER
5/24/2022      RE: Jim Willingham  [address] [facility] [id number]       Dear Colleague,    Thank you for the opportunity to participate in the care of your patient, Jim Willingham, at the [facility] [place] [facility]. Please see a copy of my visit note below.    ADULT HEART TRANSPLANT CLINIC  May 24, 2022    HPI:   Mr. Jim Willingham is a 66yr old male with a history of NICM (s/p HM2 LVAD 6/2017) s/p OHT 5/6/21, VT, AFib, CKD, DMII, and COPD who presents to clinic for routine surveillance.    His post-transplant course has been c/b right pleural air leak (required prolonged chest tube), pAFib, CAP (RLL, 6/2021), recurrent pleural effusions (s/p thoracenteses x2 6/2021 and 7/2021, exudative, repeatedly cultured negative), RLL cavitary lesions (per chest CT 7/14/21, BD glucan indeterminate, aspergillus negative, not started on antifungals), pericardial effusion (1.4cm per TTE 7/12/21, conservatively managed), low-grade EBV viremia, recurrent/persistent gout flare, transaminase elevation with questionable choledocholithiasis (conservatively managed with resolution), COVID-19 (8/2021, s/p monoclonal antibodies and remdesivir x5 days), and 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     Rejection history:  none  AlloMap scores:  < 35 recently  DSAs:  none  Coronary angio/Ischemic eval:  Routine post-transplant baseline cor angio has been deferred due to renal dysfunction --->  Note that per Dr. Montgomery, may defer until his baseline as he had a young donor.  Last RHC:  11/4/21 showed mildly normal biventricular filling pressures with RA 4, mPA 18, PCW 10, and CI 3.53.  Echo/cMRI:  TTE 11/2021 showed stable graft function, with LVEF >70% and normal RV size/function.    Since his last visit, he states that he feels well.  He is walking regularly, and does not get SOB.  He has been sleeping well, and is able to lie flat  without PND and orthopnea.  His appetite has been good, and he is eating regularly without nausea, vomiting, diarrhea, and constipation.  His weight has been stable overall, ranging 193-197# usually, but was up to 200# today, which he attributes to diet.  He does not feel any fluid retention today.  His BPs have remained stable, ranging 110/70-80s.  He has had an ongoing cold, which he attributes to allergies.  He started taking claritin, which has helped.  He otherwise denies chest pain, palpitations, dizziness, falls, new headaches, acute vision changes, fevers, chills, cough, sore throat, and signs of bleeding.    Serostatus:  CMV D+/R+  EBV D+/R+  Toxo D-/R-    Patient Active Problem List    Diagnosis Date Noted     Status post coronary angiogram 05/17/2022     Priority: Medium     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     Priority: Medium     Type 2 diabetes mellitus with diabetic polyneuropathy (H) 03/18/2022     Priority: Medium     Mild anemia 03/18/2022     Priority: Medium     Hx of aspergillosis 12/16/2021     Priority: Medium     Expressive aphasia 12/15/2021     Priority: Medium     Hospital-acquired pneumonia 12/15/2021     Priority: Medium     Encounter for monitoring tacrolimus therapy 12/11/2021     Priority: Medium     Pulmonary cavitary lesion 11/18/2021     Priority: Medium     Generalized muscle weakness 07/08/2021     Priority: Medium     Heart replaced by transplant (H) 06/28/2021     Priority: Medium     Added automatically from request for surgery [id number]       Abnormal CT scan of lung 05/25/2021     Priority: Medium     Need for prophylactic antibiotic 05/12/2021     Priority: Medium     S/P orthotopic heart transplant (H) 02/05/2021     Priority: Medium     Added automatically from request for surgery [id number]       Bilateral carpal tunnel syndrome 11/02/2020     Priority: Medium     Rotator cuff tear arthropathy of both shoulders 11/02/2020     Priority: Medium     COPD (chronic  obstructive pulmonary disease) (H) 11/02/2020     Priority: Medium     Major depression, recurrent, chronic (H) 11/02/2020     Priority: Medium     Gouty arthropathy, chronic, without tophi 11/02/2020     Priority: Medium     Iron deficiency anemia 07/31/2018     Priority: Medium     Chronic systolic congestive heart failure (H) 07/10/2018     Priority: Medium     Physical deconditioning 06/30/2017     Priority: Medium     Type 2 diabetes mellitus with complication, with long-term current use of insulin (H) 05/11/2017     Priority: Medium     Stage 3b chronic kidney disease (H) 05/11/2017     Priority: Medium     H/O gastric bypass 05/11/2017     Priority: Medium     CECILIA (obstructive sleep apnea) 05/11/2017     Priority: Medium     Vitamin B12 deficiency (non anemic) 05/11/2017     Priority: Medium     NICM (nonischemic cardiomyopathy) (H)/ EF 20% 05/11/2017     Priority: Medium     ECHO: LVEDd. 7.66 cm, Restrictive pattern , Severe mitral valve regurgitation         PAST MEDICAL HISTORY:  Past Medical History:   Diagnosis Date     Bariatric surgery status 2003     Benign essential hypertension 05/11/2017     Bilateral carpal tunnel syndrome 11/02/2020     Cardiomyopathy, unspecified (H) 05/08/2017     CKD (chronic kidney disease) stage 3, GFR 30-59 ml/min (H) 05/11/2017     COPD (chronic obstructive pulmonary disease) (H) 11/02/2020     Depression 05/11/2017     Diabetes mellitus (H) 1995     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 7.66 cm, Restrictive pattern , Severe mitral valve regurgitation     CECILIA (obstructive sleep apnea)  05/11/2017     Paroxysmal atrial fibrillation (H) 05/11/2017     Paroxysmal VT (H) 05/11/2017     Pulmonary cavitary lesion 11/18/2021     Rotator cuff tear arthropathy of both shoulders 11/02/2020     Type 2 diabetes mellitus with diabetic polyneuropathy (H) 3/18/2022     Uncomplicated asthma      Vitamin B12 deficiency (non anemic) 05/11/2017       CURRENT MEDICATIONS:  Prescription Medications as of 5/24/2022       Rx Number Disp Refills Start End Last Dispensed Date Next Fill Date Owning Pharmacy    acetaminophen (TYLENOL) 325 MG tablet  100 tablet 1 7/5/2021    [facility] [place], [place] - [address]    Sig: Take 3 tablets (975 mg) by mouth every 6 hours as needed for other (For optimal non-opioid multimodal pain management to improve pain control.)    Class: E-Prescribe    Route: Oral    albuterol (VENTOLIN HFA) 108 (90 Base) MCG/ACT inhaler  18 g 11 11/18/2021    [facility][id number] [facility] [id number] [facility] [address]    Sig: INHALE 2 PUFFS BY MOUTH EVERY 4 HOURS AS NEEDED. MAX OF 12 PUFFS PER 24 HOURS    Class: E-Prescribe    Notes to Pharmacy: Pharmacy may dispense brand covered by insurance (Proair, or proventil or ventolin or generic albuterol inhaler)    allopurinol (ZYLOPRIM) 300 MG tablet  90 tablet 1 11/18/2021    [facility] #[id number] [facility] [address] [facility] [address]    Sig: Take 1 tablet (300 mg) by mouth daily    Class: E-Prescribe    Route: Oral    anakinra (KINERET) 100 MG/0.67ML SOSY injection  20.1 mL 6 1/10/2022 2/9/2022   [facility] - [place], [place] - [address] [place] [address] [place]    Sig: Inject 0.67 mLs (100 mg) Subcutaneous daily As directed by rheumatology    Class: E-Prescribe    Route: Subcutaneous    aspirin (ASA) 81 MG chewable tablet  100 tablet 1 6/1/2021    [facility] - [place], [place] - [address]    Sig: Take 1 tablet (81 mg) by mouth daily     Class: E-Prescribe    Route: Oral    blood glucose (NO BRAND SPECIFIED) test strip  200 strip 3 8/16/2021    [facility] [address] [place] [address] [address] [facility] [address]    Sig: Use to test blood sugar four times daily or as directed.    Class: E-Prescribe    blood glucose (ONE TOUCH DELICA) lancing device  1 each 0 7/7/2021    [facility] [address] [place] [address] [address] [facility] [address]    Sig: Lancing device to be used with lancets.    Class: E-Prescribe    blood glucose monitoring (ONE TOUCH ULTRA 2) meter device kit  1 kit 0 1/24/2020    [facility] [address] [place] [address] [address] [facility] [address]    Sig: Use to test blood sugar four times daily or as directed.    Class: E-Prescribe    buPROPion (WELLBUTRIN) 75 MG tablet  180 tablet 1 11/18/2021    [facility] [address] [place] [address] [address] [facility] [address]    Sig: Take 1 tablet (75 mg) by mouth 2 times daily    Class: E-Prescribe    Route: Oral    calcium citrate-vitamin D (CITRACAL) 315-250 MG-UNIT TABS per tablet  180 tablet 3 11/18/2021    [facility] [address] [place] [address] [address] [facility] [address]    Sig: Take 1 tablet by mouth 2 times daily    Class: E-Prescribe    Route: Oral    docusate sodium (COLACE) 100 MG capsule  60 capsule 3 12/17/2021    [facility] [place], [place] - [address]    Sig: Take 1 capsule (100 mg) by mouth 2 times daily    Class: E-Prescribe    Route: Oral    Fluticasone-Umeclidin-Vilanterol (TRELEGY ELLIPTA) 100-62.5-25 MCG/INH oral inhaler  1 each 11 11/18/2021    [id number] [place] [address] [facility] [address]    Sig: Inhale 1 puff into the lungs daily    Class: E-Prescribe    Route: Inhalation    gabapentin (NEURONTIN) 300 MG capsule  60 capsule 0 5/16/2022    [facility][id number] - [place], [place] -  [address] [facility] [address]    Sig: Take 1 capsule (300 mg) by mouth 2 times daily    Class: E-Prescribe    Route: Oral    insulin pen needle (32G X 4 MM) 32G X 4 MM miscellaneous  110 each 0 1/24/2020    [facility][id number] [facility] [address] [facility] [address]    Sig: Use four pen needles daily or as directed.    Class: E-Prescribe    Melatonin 10 MG CAPS            Sig: Take 1 capsule by mouth At Bedtime    Class: Historical    Route: Oral    montelukast (SINGULAIR) 10 MG tablet  90 tablet 1 1/3/2022    [facility] #[id number] - [facility] [address] [facility] [address]    Sig: TAKE 1 TABLET(10 MG) BY MOUTH AT BEDTIME    Class: E-Prescribe    mycophenolate (GENERIC EQUIVALENT) 250 MG capsule  360 capsule 3 2/25/2022    [facility] [address] [place] [address] [address] [facility]    Sig: TAKE 2 CAPSULES(500 MG) BY MOUTH TWICE DAILY    Class: E-Prescribe    Notes to Pharmacy: TXP DT 5/6/2021 (Heart) TXP Dischg DT 5/31/2021 DX Heart replaced by transplant Z94.1 TX [facility] ([place], [place])    rosuvastatin (CRESTOR) 10 MG tablet  90 tablet 3 12/18/2021    [facility] - [place], [place] - [address] [place] [address]    Sig: Take 1 tablet (10 mg) by mouth daily    Class: E-Prescribe    Route: Oral    tacrolimus (GENERIC EQUIVALENT) 0.5 MG capsule  30 capsule 11 5/7/2022    [facility] [address] [place] [address] [address] [facility]    Sig: Take ONE cap with FOUR 1 mg caps (4.5 mg) every AM    Class: E-Prescribe    Notes to Pharmacy: TXP DT 5/6/2021 (Heart) TXP Dischg DT 5/31/2021 DX Heart replaced by transplant Z94.1 TX [facility] ([place], [place])    tacrolimus (GENERIC EQUIVALENT) 1 MG capsule  240 capsule 11 5/7/2022    [facility] [address] [place] [address] [address] [facility]    Sig: Take FOUR caps with ONE 0.5 mg cap  "(4.5 mg) every AM and FOUR caps (4 mg) every PM    Class: E-Prescribe    Notes to Pharmacy: TXP DT 5/6/2021 (Heart) TXP Dischg DT 5/31/2021 DX Heart replaced by transplant Z94.1 [place] [facility] ([place], [place])    traMADol (ULTRAM) 50 MG tablet  120 tablet 3 3/18/2022    [facility] #[id number] [place] [id number] [facility]  [address]    Sig: One tablet in am, one table in afternoon and two tablet at bedtime    Class: E-Prescribe          ROS:   Constitutional: No fever, chills, or sweats. Weight overall stable.   ENT: No visual disturbance, ear ache, epistaxis, sore throat.   Allergies/Immunologic: Negative.   Respiratory: As per HPI.   Cardiovascular: As per HPI.   GI: No nausea, vomiting, hematemesis, melena, or hematochezia.   : No urinary frequency, dysuria, or hematuria.   Integument: Negative.   Psychiatric: Negative.   Neuro: Negative.   Endocrinology: Negative.   Musculoskeletal: Negative.    Exam:  /79 (BP Location: Right arm, Patient Position: Chair, Cuff Size: Adult Regular)   Pulse 109   Ht 1.755 m (5' 9.09\")   Wt 93.7 kg (206 lb 8 oz)   SpO2 99%   BMI 30.41 kg/m    In general, the patient is a pleasant male in no apparent distress.    HEENT: NC/AT. PERRLA. EOMI.  Sclerae white, not injected.    Neck:  No adenopathy, No thyromegaly.    COR: No jugular venous distention when sitting upright.  RRR.  Normal S1 S2 splits physiologically.  No murmur, rub click, or gallop.    Lungs:  CTA. No rhonchi.    Abdomen: soft, nontender, nondistended.  No organomegaly.  Extremities:  No clubbing, cyanosis, or LE edema.    Neuro: Alert & Oriented x 3, grossly non focal.  Integument: no open lesions, rashes, or jaundice.    Labs:  CBC RESULTS:   Lab Results   Component Value Date    WBC 6.6 05/17/2022    WBC 7.8 07/11/2021    RBC 3.87 (L) 05/17/2022    RBC 3.06 (L) 07/11/2021    HGB 11.3 (L) 05/17/2022    HGB 11.3 (L) 05/17/2022    HGB " 8.7 (L) 07/11/2021    HCT 36.4 (L) 05/17/2022    HCT 28.4 (L) 07/11/2021    MCV 94 05/17/2022    MCV 93 07/11/2021    MCH 29.2 05/17/2022    MCH 28.4 07/11/2021    MCHC 31.0 (L) 05/17/2022    MCHC 30.6 (L) 07/11/2021    RDW 13.2 05/17/2022    RDW 15.6 (H) 07/11/2021     05/17/2022     07/11/2021       BMP RESULTS:  Lab Results   Component Value Date     05/17/2022     (L) 07/11/2021    POTASSIUM 4.0 05/17/2022    POTASSIUM 5.1 07/11/2021    CHLORIDE 110 (H) 05/17/2022    CHLORIDE 104 07/11/2021    CO2 20 05/17/2022    CO2 23 07/11/2021    ANIONGAP 8 05/17/2022    ANIONGAP 6 07/11/2021     (H) 05/17/2022     (H) 07/11/2021    BUN 32 (H) 05/17/2022    BUN 49 (H) 07/11/2021    CR 1.76 (H) 05/17/2022    CR 2.75 (H) 07/11/2021    GFRESTIMATED 43 (L) 05/17/2022    GFRESTIMATED 42 (L) 12/15/2021    GFRESTIMATED 23 (L) 07/11/2021    GFRESTBLACK 27 (L) 07/11/2021    QAMAR 8.8 05/17/2022    QAMAR 7.9 (L) 07/11/2021      LIPID RESULTS:  Lab Results   Component Value Date    CHOL 116 05/17/2022    CHOL 209 (H) 11/05/2020    HDL 61 05/17/2022    HDL 87 11/05/2020    LDL 42 05/17/2022     (H) 11/05/2020    TRIG 64 05/17/2022    TRIG 91 11/05/2020    NHDL 55 05/17/2022    NHDL 122 11/05/2020       IMMUNOSUPPRESSANT LEVELS  Lab Results   Component Value Date    TACROL 4.9 (L) 05/17/2022    TACROL 11.4 07/11/2021    DOSTAC 5/17/2022 05/17/2022    DOSTAC Not Provided 07/11/2021       No components found for: CK  Lab Results   Component Value Date    MAG 1.9 05/17/2022    MAG 1.8 07/11/2021     Lab Results   Component Value Date    A1C 5.5 05/17/2022    A1C 5.1 05/04/2021     Lab Results   Component Value Date    PHOS 3.5 05/17/2022    PHOS 2.8 06/30/2021     Lab Results   Component Value Date    NTBNPI 3,277 (H) 08/24/2021    NTBNPI 1,697 (H) 06/28/2021     Lab Results   Component Value Date    SAITESTMET [facility] 05/17/2022    SAITESET [facility] 06/07/2021    JR Class I 05/17/2022     SAICELL Class I 06/07/2021    [id number] Cw:12 05/17/2022    GM6NDXLUF Cw:12 06/07/2021    [id number] None 05/17/2022    NY1QLAFZBC None 06/07/2021    SAIREPCOM  05/17/2022      Test performed by modified procedure. Serum heat inactivated and tested by a modified ([place]) protocol including fetal calf serum addition. High-risk, mfi >3,000. Mod-risk, mfi 500-3,000.    SAIREPCOM  06/07/2021      Test performed by modified procedure. Serum heat inactivated and tested   by a modified ([place]) protocol including fetal calf serum addition.   High-risk, mfi >3,000. Mod-risk, mfi 500-3,000.       Lab Results   Component Value Date    SAIITESTME  FCS 05/17/2022    SAIITESTME  FCS 06/07/2021    SAIICELL Class II 05/17/2022    SAIICELL Class II 06/07/2021    [id number] None 05/17/2022    RF6NNIBPK None 06/07/2021    [id number] None 05/17/2022    [id number] None 06/07/2021    SAIIREPCOM  05/17/2022      Test performed by modified procedure. Serum heat inactivated and tested by a modified ([place]) protocol including fetal calf serum addition. High-risk, mfi >3,000. Mod-risk, mfi 500-3,000.    SAIIREPCOM  06/07/2021      Test performed by modified procedure. Serum heat inactivated and tested   by a modified ([place]) protocol including fetal calf serum addition.   High-risk, mfi >3,000. Mod-risk, mfi 500-3,000.       Lab Results   Component Value Date    CSPEC Plasma 06/29/2021       Assessment and Plan:  Mr. Jim Willingham is a 66yr old male with a history of NICM (s/p HM2 LVAD 6/2017) s/p OHT 5/6/21, VT, AFib, CKD, DMII, and COPD who presents to clinic for routine surveillance.    Labs from 5/17/22 showed stable electrolytes, improved renal function, stable liver function, thyroid function, cholesterol panel, and blood counts.  HgbA1c 5.5%, PSA 0.5.  CMV negative, EBV stable.  No DSAs, AlloMap 30, and tacro level 4.9.  Biopsy was negative for rejection.    CXR was negative for acute changes.    Coronary angiogram showed minimal,  nonobstructive CAV (20% stenosis in the ostial RCA to prox RCA), and RHC showed normal biventricular filling pressures with RA 5, mPA 22, PCW 9, CI 2.9.    He will have a cMRI in the coming days.    Mr. Willingham appears well.  His BPs are controlled.  His weight trend is overall stable, and he appears euvolemic today and per RHC last week.    Given the CAV seen on his angio and his ongoing renal dysfunction, will plan to review ongoing IMS plan with Dr. Montgomery.  Informed him that if we were to make any changes, that he would need a biopsy 1 month after the change.    We will plan for him to follow-up per protocol, or sooner should new concerns arise.      NICM, s/p OHT 5/6/21  His post-transplant course has been c/b right pleural air leak (required prolonged chest tube), pAFib, CAP (RLL, 6/2021), recurrent pleural effusions (s/p thoracenteses x2 6/2021 and 7/2021, exudative, repeatedly cultured negative), RLL cavitary lesions (per chest CT 7/14/21, BD glucan indeterminate, aspergillus negative, not started on antifungals), pericardial effusion (1.4cm per TTE 7/12/21, conservatively managed), low-grade EBV viremia, recurrent/persistent gout flare, transaminase elevation with questionable choledocholithiasis (conservatively managed with resolution), COVID-19 (8/2021, s/p monoclonal antibodies and remdesivir x5 days), and 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     Rejection history:  none  AlloMap scores:  < 35 recently  DSAs:  none  Coronary angio/Ischemic eval:  Routine post-transplant baseline cor angio was deferred due to renal dysfunction.  Cor angio from last week as noted above.  Last RHC:  as above  Echo/cMRI:  TTE 11/2021 showed stable graft function, with LVEF >70% and normal RV size/function.    Serostatus:  - CMV D+/R+  - EBV D+/R+  - Toxo D-/R-     Immunosuppression:  - MMF 500mg twice daily (dose decreased due to multiple pneumonias)  - tacro, goal level 4-6.  Tacro level  last week was 4.9.  Given renal dysfunction and mild CAV, will review ongoing tacro goal and IMS plan with Dr. Montgomery.     PPx:  - CAV:  Aspirin 81mg daily and rosuvastatin 10mg daily.  - GI: Pantoprazole 40mg daily  - Osteoporosis: Calcium/vitamin D supplements     Graft function:  - BPs: Controlled, not on antihypertensives  - fluid status: Euvolemic, not on diuretics    Health Maintenance:  - derm exam scheduled for tomorrow  - eye exam overdue, going to schedule, last was roughly 1 year ago  - dental exam overdue  - covid vaccinations -- will get 4th on 6/8, has not gotten Evushield (will see if he can get this today)  - flu shot 2021  - needs shingrix -- discussed waiting at least 2 weeks after COVID vaccine  - pneumonia shots unclear, advised that he d/w PCP     Septic encephalopathy, resolved  Expressive aphasia, resolved  Presented 12/15/21 with expressive aphasia/word garbling with complete understanding of others. Wife reports not noticing this occurring prior to day of admission. Patient denied headaches, loss of consciousness, or vision changes. Likely septic encephalopathy given WBC count (17.3).  - neuro consulted  - CT head 12/15- negative for ischemia or acute infarction  - CTA head and neck w/ contrast 12/15 - no aneurysms or stenosis of major intracranial/cervical arteries  - Groundglass opacities found on CTA and chest XR, likely infection.   - MRI brain 1/16 without acute findings, no c/f PRES  - urine drug screen and ethanol negative     Sepsis due to right lung PNA  WBC count 17.3 on admission, Chest CT completed 12/15 upon admission shows groundglass opacities and consolidative opacities throughout right lung, concerning for infection. WBC decreased to 11.4 after one day of IV zosyn and 7.6 on day of discharge. Transplant ID consulted. Zosyn 4.5g IV [id number] changed to doxycycline 100 mg bid x 3 weeks per transplant ID (through 1/5).     KALI cavitary lesion  +Aspergillus (9/2021)  S/p 2  months of voriconazole, follows with Transplant ID.     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  He remains asymptomatic.  Level stable.     Gout/pseudogout  Predated transplant, has restricted mobility.  Has had recurrent flares following transplant that have required steroid bursts and Anakinra treatment.  He continues to follow with rheumatology.  Continue allopurinol and anakinra as needed -- note no recent flares, no recent anakinra.  Needs to follow-up with rheum.     CKD  Creatinine has typically ranged 1.5-1.9, remains stable.  He appears euvolemic.  He recently restarted leah.  Will discuss ongoing plan for tacro/immunosuppression with Dr. Montgomery.     Recurrent exudative pleural effusions (6/2021, 7/2021), resolved.   DMII:  management per PCP/endo  Depression:  Wellbutrin 75mg BID  COPD:  singulair, Trelegy Ellipta, Albuterol, follows with pulmonary.  COVID-19 infection: s/p monoclonal antibodies and remdesivir (8/2021).           The above was reviewed with Mr. Willingham, who verbalized understanding and will call with further questions/concerns.     45 minutes spent with patient, along with preparing for visit, reviewing follow up, and completing documentation.        Shelia Means, JOSE, FNP-BC, CHFN  Advanced Heart Failure Nurse Practitioner  [facility]  Patient Care Team:  Ricardo Gómez MD as PCP - General (Internal Medicine)  Elfego Hayden MD as MD (Internal Medicine)  Delisa Montgomery MD as Referring Physician (Cardiology)  Chase Qureshi MD as MD (Internal Medicine-Hematology & Oncology)  Kadie Pedro LICSW as  ( - Clinical)  Elbert Pettit MD as Assigned Rheumatology Provider  Forest Gonzalez MD as Assigned Palliative Care Provider  Nba Ag DO as Assigned Musculoskeletal Provider  Yolette Rueda RN as Transplant Coordinator  [facility] as Pharmacist (Pharmacist)  Felix  MD Marcos as MD (Infectious Diseases)  Lyle Sarmiento DO as MD (Nephrology)  Ainsa Quiñones MD as Assigned Infectious Disease Provider  Sheyla Fletcher MD as Assigned Sleep Provider  Corey Melendrez MD as MD (Dermatology)

## 2022-05-27 ENCOUNTER — DOCUMENTATION ONLY (OUTPATIENT)
Dept: TRANSPLANT | Facility: CLINIC | Age: 67
End: 2022-05-27
Payer: COMMERCIAL

## 2022-05-27 DIAGNOSIS — Z94.1 TRANSPLANTED HEART (H): Primary | ICD-10-CM

## 2022-05-27 NOTE — PROGRESS NOTES
After discussing with Dr. Montgomery, no changing IMS at this time. Pt is doing well. 16 month orders in for Sept.

## 2022-05-31 ENCOUNTER — HOSPITAL ENCOUNTER (OUTPATIENT)
Dept: MRI IMAGING | Facility: CLINIC | Age: 67
Discharge: HOME | End: 2022-05-31
Attending: INTERNAL MEDICINE | Admitting: INTERNAL MEDICINE
Payer: COMMERCIAL

## 2022-05-31 VITALS
OXYGEN SATURATION: 97 % | HEART RATE: 93 BPM | DIASTOLIC BLOOD PRESSURE: 93 MMHG | SYSTOLIC BLOOD PRESSURE: 122 MMHG | RESPIRATION RATE: 14 BRPM

## 2022-05-31 PROCEDURE — 75565 CARD MRI VELOC FLOW MAPPING: CPT

## 2022-05-31 PROCEDURE — 75563 CARD MRI W/STRESS IMG & DYE: CPT | Mod: 26 | Performed by: STUDENT IN AN ORGANIZED HEALTH CARE EDUCATION/TRAINING PROGRAM

## 2022-05-31 PROCEDURE — 999N000054 HC STATISTIC EKG NON-CHARGEABLE

## 2022-05-31 PROCEDURE — 255N000002 HC RX 255 OP 636: Performed by: INTERNAL MEDICINE

## 2022-05-31 PROCEDURE — 93018 CV STRESS TEST I&R ONLY: CPT | Performed by: STUDENT IN AN ORGANIZED HEALTH CARE EDUCATION/TRAINING PROGRAM

## 2022-05-31 PROCEDURE — 75565 CARD MRI VELOC FLOW MAPPING: CPT | Mod: 26 | Performed by: STUDENT IN AN ORGANIZED HEALTH CARE EDUCATION/TRAINING PROGRAM

## 2022-05-31 PROCEDURE — 93005 ELECTROCARDIOGRAM TRACING: CPT

## 2022-05-31 PROCEDURE — 93016 CV STRESS TEST SUPVJ ONLY: CPT | Performed by: STUDENT IN AN ORGANIZED HEALTH CARE EDUCATION/TRAINING PROGRAM

## 2022-05-31 PROCEDURE — A9585 GADOBUTROL INJECTION: HCPCS | Performed by: INTERNAL MEDICINE

## 2022-05-31 PROCEDURE — 250N000013 HC RX MED GY IP 250 OP 250 PS 637: Performed by: STUDENT IN AN ORGANIZED HEALTH CARE EDUCATION/TRAINING PROGRAM

## 2022-05-31 PROCEDURE — 93010 ELECTROCARDIOGRAM REPORT: CPT | Mod: 76 | Performed by: INTERNAL MEDICINE

## 2022-05-31 PROCEDURE — 250N000011 HC RX IP 250 OP 636: Performed by: STUDENT IN AN ORGANIZED HEALTH CARE EDUCATION/TRAINING PROGRAM

## 2022-05-31 RX ORDER — CAFFEINE CITRATE 20 MG/ML
60 SOLUTION INTRAVENOUS
Status: DISCONTINUED | OUTPATIENT
Start: 2022-05-31 | End: 2022-06-01 | Stop reason: HOSPADM

## 2022-05-31 RX ORDER — DIPHENHYDRAMINE HCL 25 MG
25 CAPSULE ORAL
Status: DISCONTINUED | OUTPATIENT
Start: 2022-05-31 | End: 2022-06-01 | Stop reason: HOSPADM

## 2022-05-31 RX ORDER — ALBUTEROL SULFATE 90 UG/1
2 AEROSOL, METERED RESPIRATORY (INHALATION) EVERY 5 MIN PRN
Status: DISCONTINUED | OUTPATIENT
Start: 2022-05-31 | End: 2022-06-01 | Stop reason: HOSPADM

## 2022-05-31 RX ORDER — ONDANSETRON 2 MG/ML
4 INJECTION INTRAMUSCULAR; INTRAVENOUS
Status: DISCONTINUED | OUTPATIENT
Start: 2022-05-31 | End: 2022-06-01 | Stop reason: HOSPADM

## 2022-05-31 RX ORDER — METHYLPREDNISOLONE SODIUM SUCCINATE 125 MG/2ML
125 INJECTION, POWDER, LYOPHILIZED, FOR SOLUTION INTRAMUSCULAR; INTRAVENOUS
Status: DISCONTINUED | OUTPATIENT
Start: 2022-05-31 | End: 2022-06-01 | Stop reason: HOSPADM

## 2022-05-31 RX ORDER — GADOBUTROL 604.72 MG/ML
10 INJECTION INTRAVENOUS ONCE
Status: COMPLETED | OUTPATIENT
Start: 2022-05-31 | End: 2022-05-31

## 2022-05-31 RX ORDER — REGADENOSON 0.08 MG/ML
0.4 INJECTION, SOLUTION INTRAVENOUS ONCE
Status: COMPLETED | OUTPATIENT
Start: 2022-05-31 | End: 2022-05-31

## 2022-05-31 RX ORDER — ACYCLOVIR 200 MG/1
0-1 CAPSULE ORAL
Status: DISCONTINUED | OUTPATIENT
Start: 2022-05-31 | End: 2022-06-01 | Stop reason: HOSPADM

## 2022-05-31 RX ORDER — AMINOPHYLLINE 25 MG/ML
100 INJECTION, SOLUTION INTRAVENOUS ONCE
Status: COMPLETED | OUTPATIENT
Start: 2022-05-31 | End: 2022-05-31

## 2022-05-31 RX ORDER — DIAZEPAM 5 MG
5 TABLET ORAL EVERY 30 MIN PRN
Status: DISCONTINUED | OUTPATIENT
Start: 2022-05-31 | End: 2022-06-01 | Stop reason: HOSPADM

## 2022-05-31 RX ORDER — DIPHENHYDRAMINE HYDROCHLORIDE 50 MG/ML
25-50 INJECTION INTRAMUSCULAR; INTRAVENOUS
Status: DISCONTINUED | OUTPATIENT
Start: 2022-05-31 | End: 2022-06-01 | Stop reason: HOSPADM

## 2022-05-31 RX ADMIN — GADOBUTROL 10 ML: 604.72 INJECTION INTRAVENOUS at 14:06

## 2022-05-31 RX ADMIN — REGADENOSON 0.4 MG: 0.08 INJECTION, SOLUTION INTRAVENOUS at 14:24

## 2022-05-31 RX ADMIN — DIAZEPAM 5 MG: 5 TABLET ORAL at 13:36

## 2022-05-31 RX ADMIN — GADOBUTROL 10 ML: 604.72 INJECTION INTRAVENOUS at 14:04

## 2022-05-31 RX ADMIN — AMINOPHYLLINE 100 MG: 25 INJECTION, SOLUTION INTRAVENOUS at 14:28

## 2022-05-31 NOTE — PROGRESS NOTES
Patient presents for cardiac stress MRI and denies caffeine consumption in the past 12 hours.  Patient is educated on procedure for cardiac stress MRI including the medications that will be used and their possible side effects.  Lungs are clear bilaterally.  Patient tolerated the Lexiscan injection reporting only a heaviness in his arms which patient states has resolved by five minutes post injection.  Aminophylline is given per protocol and patient is monitored x 10 mn, then is left under the care of MRI staff.

## 2022-06-01 ENCOUNTER — E-VISIT (OUTPATIENT)
Dept: FAMILY MEDICINE | Facility: CLINIC | Age: 67
End: 2022-06-01
Payer: COMMERCIAL

## 2022-06-01 ENCOUNTER — TELEPHONE (OUTPATIENT)
Dept: TRANSPLANT | Facility: CLINIC | Age: 67
End: 2022-06-01
Payer: COMMERCIAL

## 2022-06-01 DIAGNOSIS — F41.8 SITUATIONAL ANXIETY: Primary | ICD-10-CM

## 2022-06-01 LAB
ATRIAL RATE - MUSE: 93 BPM
ATRIAL RATE - MUSE: 94 BPM
DIASTOLIC BLOOD PRESSURE - MUSE: NORMAL MMHG
DIASTOLIC BLOOD PRESSURE - MUSE: NORMAL MMHG
INTERPRETATION ECG - MUSE: NORMAL
INTERPRETATION ECG - MUSE: NORMAL
P AXIS - MUSE: 62 DEGREES
P AXIS - MUSE: 64 DEGREES
PR INTERVAL - MUSE: 142 MS
PR INTERVAL - MUSE: 156 MS
QRS DURATION - MUSE: 88 MS
QRS DURATION - MUSE: 94 MS
QT - MUSE: 356 MS
QT - MUSE: 362 MS
QTC - MUSE: 445 MS
QTC - MUSE: 450 MS
R AXIS - MUSE: 26 DEGREES
R AXIS - MUSE: 87 DEGREES
SYSTOLIC BLOOD PRESSURE - MUSE: NORMAL MMHG
SYSTOLIC BLOOD PRESSURE - MUSE: NORMAL MMHG
T AXIS - MUSE: 59 DEGREES
T AXIS - MUSE: 65 DEGREES
VENTRICULAR RATE- MUSE: 93 BPM
VENTRICULAR RATE- MUSE: 94 BPM

## 2022-06-01 PROCEDURE — 99421 OL DIG E/M SVC 5-10 MIN: CPT | Performed by: INTERNAL MEDICINE

## 2022-06-01 RX ORDER — ALPRAZOLAM 1 MG
TABLET ORAL
Qty: 10 TABLET | Refills: 0 | Status: SHIPPED | OUTPATIENT
Start: 2022-06-01 | End: 2023-03-28

## 2022-06-01 ASSESSMENT — ANXIETY QUESTIONNAIRES
7. FEELING AFRAID AS IF SOMETHING AWFUL MIGHT HAPPEN: NOT AT ALL
GAD7 TOTAL SCORE: 2
6. BECOMING EASILY ANNOYED OR IRRITABLE: SEVERAL DAYS
5. BEING SO RESTLESS THAT IT IS HARD TO SIT STILL: NOT AT ALL
8. IF YOU CHECKED OFF ANY PROBLEMS, HOW DIFFICULT HAVE THESE MADE IT FOR YOU TO DO YOUR WORK, TAKE CARE OF THINGS AT HOME, OR GET ALONG WITH OTHER PEOPLE?: NOT DIFFICULT AT ALL
7. FEELING AFRAID AS IF SOMETHING AWFUL MIGHT HAPPEN: NOT AT ALL
GAD7 TOTAL SCORE: 2
3. WORRYING TOO MUCH ABOUT DIFFERENT THINGS: NOT AT ALL
2. NOT BEING ABLE TO STOP OR CONTROL WORRYING: NOT AT ALL
GAD7 TOTAL SCORE: 2
4. TROUBLE RELAXING: SEVERAL DAYS
1. FEELING NERVOUS, ANXIOUS, OR ON EDGE: NOT AT ALL

## 2022-06-01 NOTE — TELEPHONE ENCOUNTER
Call returned to patient. Recommended to get covid vaccine 3 months after [place]. Pt verbalized understanding. Cardiac MRI reviewed. Next appt in Sept for 16th month follow up.

## 2022-06-01 NOTE — TELEPHONE ENCOUNTER
Pt has questions if he should cancel his upcoming COVID vaccine  For June 8th  He has a different vaccine last week  We were going to call him back if they next one needed to be cancelled

## 2022-06-02 ASSESSMENT — ANXIETY QUESTIONNAIRES: GAD7 TOTAL SCORE: 2

## 2022-06-02 NOTE — PATIENT INSTRUCTIONS
Thank you for choosing us for your care. I have placed an order for a prescription so that you can start treatment. View your full visit summary for details by clicking on the link below. Your pharmacist will able to address any questions you may have about the medication.      If you're not feeling better within 4-6 weeks please schedule an appointment.  You can schedule an appointment right here in [facility], or call [phone]  If the visit is for the same symptoms as your eVisit, we'll refund the cost of your eVisit if seen within seven days.

## 2022-06-04 ENCOUNTER — HEALTH MAINTENANCE LETTER (OUTPATIENT)
Age: 67
End: 2022-06-04

## 2022-06-07 ENCOUNTER — TELEPHONE (OUTPATIENT)
Dept: FAMILY MEDICINE | Facility: CLINIC | Age: 67
End: 2022-06-07
Payer: COMMERCIAL

## 2022-06-07 DIAGNOSIS — Z94.1 S/P ORTHOTOPIC HEART TRANSPLANT (H): ICD-10-CM

## 2022-06-07 NOTE — TELEPHONE ENCOUNTER
I called and talked to the pharmacist.  Insurance is not covering given 7-day supply of tramadol.  He thinks the patient may have changed insurance.  He is going to check into with little more and then get back to regarding refill of tramadol.

## 2022-06-07 NOTE — TELEPHONE ENCOUNTER
traMADol (ULTRAM) 50 MG tablet    Per pharmacy:  The patients insurance requires a new Rx.   Please fax back for a new Rx approval.  Refills exceeded on controlled med  Please send new Rx

## 2022-06-09 ENCOUNTER — DOCUMENTATION ONLY (OUTPATIENT)
Dept: TRANSPLANT | Facility: CLINIC | Age: 67
End: 2022-06-09
Payer: COMMERCIAL

## 2022-06-09 ASSESSMENT — ENCOUNTER SYMPTOMS: NEW SYMPTOMS OF CORONARY ARTERY DISEASE: 0

## 2022-07-10 NOTE — ED TRIAGE NOTES
Patient BIBA with flu like symptoms for last week.  Symptoms have been getting worse since Thursday.  Tried duo neb at home and had albuterol neb by EMS.  Patient is on 2L of O2.  Patient has an LVAD.   No

## 2022-07-16 NOTE — Clinical Note
Potential access sites were evaluated for patency using ultrasound.   The right jugular vein was selected. Access was obtained under with Sonosite guidance using a standard 18 guage needle with direct visualization of needle entry.      No

## 2022-07-19 ENCOUNTER — THERAPY VISIT (OUTPATIENT)
Dept: SLEEP MEDICINE | Facility: CLINIC | Age: 67
End: 2022-07-19
Payer: COMMERCIAL

## 2022-07-19 ENCOUNTER — DOCUMENTATION ONLY (OUTPATIENT)
Dept: SLEEP MEDICINE | Facility: CLINIC | Age: 67
End: 2022-07-19

## 2022-07-19 DIAGNOSIS — G47.33 OSA (OBSTRUCTIVE SLEEP APNEA): ICD-10-CM

## 2022-07-19 PROCEDURE — 95810 POLYSOM 6/> YRS 4/> PARAM: CPT | Performed by: INTERNAL MEDICINE

## 2022-07-30 ENCOUNTER — HEALTH MAINTENANCE LETTER (OUTPATIENT)
Age: 67
End: 2022-07-30

## 2022-07-31 DIAGNOSIS — Z94.1 S/P ORTHOTOPIC HEART TRANSPLANT (H): ICD-10-CM

## 2022-08-03 RX ORDER — GABAPENTIN 300 MG/1
CAPSULE ORAL
Qty: 60 CAPSULE | Refills: 0 | OUTPATIENT
Start: 2022-08-03

## 2022-08-03 RX ORDER — GABAPENTIN 300 MG/1
300 CAPSULE ORAL 2 TIMES DAILY
Qty: 60 CAPSULE | Refills: 0 | Status: SHIPPED | OUTPATIENT
Start: 2022-08-03 | End: 2022-09-27

## 2022-08-14 NOTE — PROCEDURES
" SLEEP STUDY INTERPRETATION  DIAGNOSTIC POLYSOMNOGRAPHY REPORT      Patient: LOIS FULLER  YOB: 1955  Study Date: 7/19/2022  MRN: [id number]  Referring Provider: Ricardo Gómez MD  Ordering Provider: -    Indications for Polysomnography: The patient is a 67-year-old Male who is 5' 5\" and weighs 171.0 lbs. His BMI is 28.5, McCune sleepiness scale 11 and neck circumference is 40 cm. Relevant medical history includes CECILIA, CKD Stage III, DM Type II, HTN, COPD, and NICM status post heart transplant 5/6/2021.  A diagnostic polysomnogram was performed to evaluate for sleep apnea/ RBD /hypoxemia.    Polysomnogram Data: A full night polysomnogram recorded the standard physiologic parameters including EEG, EOG, EMG, ECG, nasal and oral airflow. Respiratory parameters of chest and abdominal movements were recorded with respiratory inductance plethysmography. Oxygen saturation was recorded by pulse oximetry. Hypopnea scoring rule used: 1B 4%.    Sleep Architecture: All sleep stages were seen.  REM sleep was reduced.  The total recording time of the polysomnogram was 389.9 minutes. The total sleep time was 327.5 minutes. Sleep latency was decreased at 3.9 minutes without the use of a sleep aid. REM latency was 76.5 minutes. Arousal index was normal at 9.7 arousals per hour. Sleep efficiency was slightly decreased at 84.0%. Wake after sleep onset was 58.5 minutes. The patient spent 15.1% of total sleep time in Stage N1, 54.0% in Stage N2, 16.5% in Stage N3, and 14.4% in REM. Time in REM supine was 8.5 minutes.    Respiration: Snoring was reported, but there is no evidence of clinically significant sleep-related breathing disorder.  Events ? The polysomnogram revealed a presence of - obstructive, - central, and - mixed apneas resulting in an apnea index of - events per hour. There were 13 obstructive hypopneas and - central hypopneas resulting in an obstructive hypopnea index of 2.4 and central hypopnea index of - " events per hour. The combined apnea/hypopnea index was 2.4 events per hour (central apnea/hypopnea index was - events per hour). The REM AHI was 6.4 events per hour. The supine AHI was 1.1 events per hour. The RERA index was 2.0 events per hour.  The RDI was 4.4 events per hour.    Snoring - was reported as mild.    Respiratory rate and pattern - was notable for normal respiratory rate and pattern.    Sustained Sleep Associated Hypoventilation - Transcutaneous carbon dioxide monitoring was not used; however significant hypoventilation was not suggested by oximetry.    Sleep Associated Hypoxemia - (Greater than 5 minutes O2 sat at or below 88%) was not present. Baseline oxygen saturation was 93.8%. Lowest oxygen saturation was 88.0%. Time spent less than or equal to 88% was 0.2 minutes. Time spent less than or equal to 89% was 0.3 minutes. Frequent number of limb movements caused the oximeter at times to go in and out.    Movement Activity: Frequent periodic limb movements were observed during both non-REM and REM sleep.  However, they were not associated with significant arousals.  REM sleep without atonia was noted with arm and leg movements, mumbling and yelling, suggestive of REM sleep behavior disorder(RBD)  Periodic Limb Activity - There were 599 PLMs during the entire study. The PLM index was 109.7 movements per hour. The PLM Arousal Index was 3.3 per hour.    REM EMG Activity - Excessive transient/sustained muscle activity was present.    Nocturnal Behavior - Abnormal sleep related behaviors were noted during/arising out of REM sleep including arm and leg movements, mumbling and yelling.     Bruxism - Not apparent.    Cardiac Summary: Sinus tachycardia was noted throughout the study.  The average pulse rate was 96.6 bpm. The minimum pulse rate was 89.0 bpm while the maximum pulse rate was 102.0 bpm.       Assessment:     All sleep stages were seen.  REM sleep was reduced.    Snoring was reported. but there is  no evidence of clinically significant sleep-related breathing disorder.    Frequent periodic limb movements were observed during both non-REM and REM sleep.  However, they were not associated with significant arousals.  REM sleep without atonia was noted with arm and leg movements, mumbling and yelling, suggestive of REM sleep behavior disorder.    Sinus tachycardia was noted throughout the study.    Recommendations:     Patient may be a candidate for dental appliance through referral to Sleep Dentistry for the treatment of snoring.    Pharmacologic therapy should be used for management of restless legs syndrome only if present and clinically indicated and not based on the presence of periodic limb movements alone.     RBD: Suggest treatment with melatonin, advising patient about safety of sleep environment and obtaining neurology consultation to obtain screening for neurodegenerative diseases.    Sinus tachycardia throughout the study. Suggest follow up with Cardiology.    Advice regarding the risks of drowsy driving.    Suggest optimizing sleep schedule and avoiding sleep deprivation.    Weight management (if BMI > 30).    Diagnostic Codes:   Periodic Limb Movement Disorder G47.61  Parasomnia, REM Behavior Disorder G47.52       7/19/2022 [place] [facility] Sleep Study (171.0 lbs) - AHI 2.4, RDI 4.4, Supine AHI 1.1, REM AHI 6.4, Low O2 88.0%, Time Spent ?88% 0.2 minutes / Time Spent ?89% 0.3 minutes.     _____________________________________   Electronically Signed By: (Holly Fletcher MD), 8/13/22

## 2022-08-15 LAB — SLPCOMP: NORMAL

## 2022-08-25 NOTE — PLAN OF CARE
D: Admitted with decompensated HF-c/o RESENDIZ/fatigue  PMH: NICM EF 20% c/b VT s/p CRT-D s/p HMII LVAD 6/19/2017 , Afib-s/p DCCV, CKD3,COPD  He is now listed status 2E for transplant     I: Monitored vitals and assessed pt status. Do not disturb orders [id number]  Running: SL'd  PRN: none     A: A0x4. VSS, on RA. Monitor SR  ST  w/1st degree AVB, 0-6 PVC w/r cplt, r/bigem. Last BM 3/5. Good UO. LVAD #s WNL-no issues or alarms overnight. Pt slept well . No issues or complaints    No intake/output data recorded.    Temp:  [97.3  F (36.3  C)-98.2  F (36.8  C)] 97.8  F (36.6  C)  Pulse:  [62-82] 72  Resp:  [16-18] 18  BP: ()/(61-76) 99/69  SpO2:  [96 %-100 %] 96 %      P: Continue to monitor Pt status and report changes to treatment team. Status 2E for heart transplant         abnormal/expand...

## 2022-08-28 NOTE — PROGRESS NOTES
[facility]   Cardiology II Service / Advanced Heart Failure  Daily Progress Note      Patient: Jim Willingham  MRN: [id number]  Admission Date: 2/8/2021  Hospital Day # 58    Assessment and Plan:  Mr. Jim Willingham is a 65yr old male with a history of NICM (LVEF < 30% per TTE 1/2021), s/p HM2 LVAD (6/2017), VT, AFib, DMII, CKD, and COPD who presented with worsening symptoms and for consideration of heart transplantation.  He has been listed for transplant, and is currently status 2E, BG O+.  His exception expires 4/8.  He has demonstrated refractory hypervolemia in spite of aggressive medication titration.      PLAN:  - no changes to plan of care today    Chronic systolic heart failure secondary to NCM (LVEF < 30% per TTE 1/2021)  Stage D, NYHA Class III  - ACEi/ARB/ARNi:  Deferred due to renal function  - Afterload reduction:  Hydralazine 75mg TID and isordil 10mg TID  - BB:  Contraindicated due to low cardiac output  - Aldosterone antagonist:  Deferred due to renal function  - SCD ppx:  CRT-D  - Fluid status:  euvolemic- continue bumex 4mg po twice daily     S/p HMII LVAD (6/2017)  - Antiplatelet:  Aspirin 81mg daily  - Anticoagulation:  Warfarin, dosed per pharmacy with goal INR 2.5-3 (goal increased due to power spikes/elevated flows during this hospitalization).  INR today 2.32  - MAPs:  Goal 65-85, overall at goal  - LDH: 309 (4/2), stable     VT  AFib  HRs controlled.  - Continue amiodarone 200mg once daily  - BB deferred as noted above  - continue warfarin, as noted above     WALTER on CKD III, resolved  Creatinine 2s on admission, has improved and stabilized, ranging ~1.4-1.9 overall.  - continue diuresis, as noted above  - continue to trend BMP     DMII  Low cortisol levels  Concern for hypoglycemia, resolved  H/o bariatric surgery  Last HgbA1c 1/2021 was 6.2%.  Has been on prednisone for management of carpal tunnel syndrome for about 7 years, tapered down from 9/2020 and ultimately stopped  2/2021.  Developed symptomatic hypoglycemia (BGs 50s on 3/4), so endocrine was consulted.  C-peptide was 13.9, proinsulin was 18.4 (3/4, he was normoglycemic with post-prandial glucose 84 at that time).  Serum cortisol was 7.7 (3/6), cosnytopin stim test was WNL.  Lantus and sliding scale insulin have been discontinued.  He has continued to have hypoglycemia, so was started on acarbose 4/2.  - endocrine consult, appreciate rec's  - continue to monitor BGs q4 hours  - continue acarbose 25mg daily  - per endo --> Fingerstick glucose readings might not be accurate for assessment of hypoglycemia, if the patient has another glucose reading <55 (without insulin therapy), plasma glucose should be sent for confirmation of hypoglycemia prior to correction.  While the patient is off insulin therapy, POC glucose readings above 60 are acceptable for the patient        OTHER:  Carpel tunnel, bilateral:  Evidence of thenar atrophy, s/p steroid injection 11/2020.  S/p bilateral release 2/18/21.  Appreciate ortho/plastics consults, signed off.  Continue gabapentin 300mg/600mg/600mg and tylenol as needed.  COPD/asthma:  Continue PTA breo ellipta, breo incruse, and albuterol as needed.  Rhinovirus, resolved:  S/p 5-day course of cefdinir.  Repeat respiratory virus panel was negative.  Depression:  Continue PTA buproprion.  Staph Hominis Bacteremia:  No need for surveillance blood cx per transplant ID.   Acute Gout flare:  Anakinra for daily ppx and allopurinol, per rheum.      FEN: 3 gram sodium diet   PROPHY:  Coumadin  LINES:  PIV   DISPO:  TBD pending cardiac transplant.   CODE STATUS: Full Code     =============================================================    Interval History/ROS:   Mr. Willingham states that he feels well today.  His breathing has been good, and he denies SOB, PND, and orthopnea. No bloating.  He is eating, with good appetite and denies nausea, vomiting, diarrhea, and constipation.   He  Continues to deny chest  "pain, palpitations, dizziness, headaches, acute vision changes, fevers, chills, cough, sore throat, and signs of bleeding.    Last 24 hr care team notes reviewed.   ROS:  4 point ROS including Respiratory, CV, GI and , other than that noted in the HPI, is negative.     Medications: Reviewed in EPIC.     Physical Exam:   BP 91/63 (BP Location: Left arm)   Pulse 90   Temp 97.9  F (36.6  C) (Oral)   Resp 16   Ht 1.727 m (5' 8\")   Wt 99.8 kg (220 lb 1.6 oz)   SpO2 98%   BMI 33.47 kg/m    GENERAL: Appears alert and interacting appropriatly. Sitting upright at edge of bed, NAD.  HEENT: Eye symmetrical and free of discharge bilaterally. Mucous membranes moist and without lesions.  NECK: Supple and without lymphadenopathy. JVD just above clavicle when sitting upright   CV: +LVAD hum  RESPIRATORY: Respirations regular, even, and unlabored. Lungs CTA throughout.   GI: Soft and non distended with normoactive bowel sounds present in all quadrants. No tenderness, rebound, guarding. No organomegaly.   EXTREMITIES: No LE edema. All extremities are warm and well perfused.  NEUROLOGIC: Alert and interacting appropriatly. No focal deficits.   MUSCULOSKELETAL: No joint swelling or tenderness.   SKIN: No jaundice. No rashes or lesions. Driveline covered, dressing c/d/i..    Data:  CMP  Recent Labs   Lab 04/07/21  0617 04/06/21  0548 04/05/21  0536 04/04/21  0556    135 135 135   POTASSIUM 4.1 3.9 3.7 4.1   CHLORIDE 103 102 102 102   CO2 27 26 28 25   ANIONGAP 6 7 6 8   GLC 96 108* 97 92   BUN 49* 46* 44* 46*   CR 1.68* 1.72* 1.91* 1.79*   GFRESTIMATED 42* 41* 36* 39*   GFRESTBLACK 49* 48* 42* 45*   QAMAR 8.9 8.5 8.6 8.7   MAG 2.6* 2.5* 2.5* 2.6*   PROTTOTAL  --   --  6.4*  --    ALBUMIN  --   --  3.6  --    BILITOTAL  --   --  0.5  --    ALKPHOS  --   --  108  --    AST  --   --  35  --    ALT  --   --  40  --      CBC  Recent Labs   Lab 04/05/21  0536 04/02/21  0651   WBC 3.9* 3.7*   RBC 4.12* 4.18*   HGB 12.4* 12.2* "   HCT 38.7* 38.6*   MCV 94 92   MCH 30.1 29.2   MCHC 32.0 31.6   RDW 15.9* 16.3*    190     INR  Recent Labs   Lab 04/07/21  0617 04/06/21  0548 04/05/21  0536 04/04/21  0556   INR 2.32* 2.48* 2.95* 2.94*       Patient discussed with Dr. Montgomery.      Didi Butler PA-C  [facility]     Unknown if ever smoked

## 2022-09-13 DIAGNOSIS — Z94.1 TRANSPLANTED HEART (H): Primary | ICD-10-CM

## 2022-09-13 DIAGNOSIS — Z13.220 LIPID SCREENING: ICD-10-CM

## 2022-09-14 ASSESSMENT — ENCOUNTER SYMPTOMS
MUSCLE CRAMPS: 0
HEADACHES: 0
SPEECH CHANGE: 0
DISTURBANCES IN COORDINATION: 0
MUSCLE WEAKNESS: 0
DIZZINESS: 0
MYALGIAS: 1
SEIZURES: 0
JOINT SWELLING: 0
NECK PAIN: 0
ARTHRALGIAS: 1
LOSS OF CONSCIOUSNESS: 0
NUMBNESS: 0
STIFFNESS: 1
WEAKNESS: 0
MEMORY LOSS: 0
TREMORS: 1

## 2022-09-15 ENCOUNTER — ANCILLARY PROCEDURE (OUTPATIENT)
Dept: CARDIOLOGY | Facility: CLINIC | Age: 67
End: 2022-09-15
Attending: INTERNAL MEDICINE
Payer: COMMERCIAL

## 2022-09-15 ENCOUNTER — LAB (OUTPATIENT)
Dept: LAB | Facility: CLINIC | Age: 67
End: 2022-09-15
Attending: INTERNAL MEDICINE
Payer: COMMERCIAL

## 2022-09-15 VITALS
DIASTOLIC BLOOD PRESSURE: 93 MMHG | SYSTOLIC BLOOD PRESSURE: 131 MMHG | BODY MASS INDEX: 29.56 KG/M2 | HEART RATE: 104 BPM | OXYGEN SATURATION: 98 % | WEIGHT: 200.7 LBS

## 2022-09-15 DIAGNOSIS — I48.91 A-FIB (H): Primary | ICD-10-CM

## 2022-09-15 DIAGNOSIS — Z94.1 TRANSPLANTED HEART (H): ICD-10-CM

## 2022-09-15 DIAGNOSIS — Z13.220 LIPID SCREENING: ICD-10-CM

## 2022-09-15 LAB
ALBUMIN SERPL BCG-MCNC: 4.4 G/DL (ref 3.5–5.2)
ALP SERPL-CCNC: 165 U/L (ref 40–129)
ALT SERPL W P-5'-P-CCNC: 22 U/L (ref 10–50)
ANION GAP SERPL CALCULATED.3IONS-SCNC: 12 MMOL/L (ref 7–15)
ANION GAP SERPL CALCULATED.3IONS-SCNC: 13 MMOL/L (ref 7–15)
AST SERPL W P-5'-P-CCNC: 26 U/L (ref 10–50)
BASOPHILS # BLD AUTO: 0 10E3/UL (ref 0–0.2)
BASOPHILS NFR BLD AUTO: 1 %
BILIRUB SERPL-MCNC: 0.4 MG/DL
BUN SERPL-MCNC: 36.5 MG/DL (ref 8–23)
BUN SERPL-MCNC: 36.6 MG/DL (ref 8–23)
CALCIUM SERPL-MCNC: 8.9 MG/DL (ref 8.8–10.2)
CALCIUM SERPL-MCNC: 9 MG/DL (ref 8.8–10.2)
CHLORIDE SERPL-SCNC: 104 MMOL/L (ref 98–107)
CHLORIDE SERPL-SCNC: 104 MMOL/L (ref 98–107)
CHOLEST SERPL-MCNC: 136 MG/DL
CK SERPL-CCNC: 190 U/L (ref 39–308)
CMV DNA SPEC NAA+PROBE-ACNC: NOT DETECTED IU/ML
CREAT SERPL-MCNC: 1.72 MG/DL (ref 0.67–1.17)
CREAT SERPL-MCNC: 1.73 MG/DL (ref 0.67–1.17)
DEPRECATED HCO3 PLAS-SCNC: 20 MMOL/L (ref 22–29)
DEPRECATED HCO3 PLAS-SCNC: 21 MMOL/L (ref 22–29)
EOSINOPHIL # BLD AUTO: 0.2 10E3/UL (ref 0–0.7)
EOSINOPHIL NFR BLD AUTO: 5 %
ERYTHROCYTE [DISTWIDTH] IN BLOOD BY AUTOMATED COUNT: 12.5 % (ref 10–15)
GFR SERPL CREATININE-BSD FRML MDRD: 43 ML/MIN/1.73M2
GFR SERPL CREATININE-BSD FRML MDRD: 44 ML/MIN/1.73M2
GLUCOSE SERPL-MCNC: 107 MG/DL (ref 70–99)
GLUCOSE SERPL-MCNC: 108 MG/DL (ref 70–99)
HCT VFR BLD AUTO: 39.2 % (ref 40–53)
HDLC SERPL-MCNC: 71 MG/DL
HGB BLD-MCNC: 12.4 G/DL (ref 13.3–17.7)
IMM GRANULOCYTES # BLD: 0 10E3/UL
IMM GRANULOCYTES NFR BLD: 0 %
LDLC SERPL CALC-MCNC: 49 MG/DL
LVEF ECHO: NORMAL
LYMPHOCYTES # BLD AUTO: 1.8 10E3/UL (ref 0.8–5.3)
LYMPHOCYTES NFR BLD AUTO: 38 %
MAGNESIUM SERPL-MCNC: 2 MG/DL (ref 1.7–2.3)
MCH RBC QN AUTO: 30.3 PG (ref 26.5–33)
MCHC RBC AUTO-ENTMCNC: 31.6 G/DL (ref 31.5–36.5)
MCV RBC AUTO: 96 FL (ref 78–100)
MONOCYTES # BLD AUTO: 0.5 10E3/UL (ref 0–1.3)
MONOCYTES NFR BLD AUTO: 11 %
NEUTROPHILS # BLD AUTO: 2.2 10E3/UL (ref 1.6–8.3)
NEUTROPHILS NFR BLD AUTO: 45 %
NONHDLC SERPL-MCNC: 65 MG/DL
NRBC # BLD AUTO: 0 10E3/UL
NRBC BLD AUTO-RTO: 0 /100
PHOSPHATE SERPL-MCNC: 4.4 MG/DL (ref 2.5–4.5)
PLATELET # BLD AUTO: 223 10E3/UL (ref 150–450)
POTASSIUM SERPL-SCNC: 4.6 MMOL/L (ref 3.4–5.3)
POTASSIUM SERPL-SCNC: 4.7 MMOL/L (ref 3.4–5.3)
PROT SERPL-MCNC: 6.9 G/DL (ref 6.4–8.3)
RBC # BLD AUTO: 4.09 10E6/UL (ref 4.4–5.9)
SODIUM SERPL-SCNC: 137 MMOL/L (ref 136–145)
SODIUM SERPL-SCNC: 137 MMOL/L (ref 136–145)
TRIGL SERPL-MCNC: 82 MG/DL
WBC # BLD AUTO: 4.8 10E3/UL (ref 4–11)

## 2022-09-15 PROCEDURE — 80069 RENAL FUNCTION PANEL: CPT | Performed by: PATHOLOGY

## 2022-09-15 PROCEDURE — 82247 BILIRUBIN TOTAL: CPT | Performed by: PATHOLOGY

## 2022-09-15 PROCEDURE — G0463 HOSPITAL OUTPT CLINIC VISIT: HCPCS

## 2022-09-15 PROCEDURE — 84155 ASSAY OF PROTEIN SERUM: CPT | Performed by: PATHOLOGY

## 2022-09-15 PROCEDURE — 85025 COMPLETE CBC W/AUTO DIFF WBC: CPT | Performed by: PATHOLOGY

## 2022-09-15 PROCEDURE — 80061 LIPID PANEL: CPT | Performed by: INTERNAL MEDICINE

## 2022-09-15 PROCEDURE — 93306 TTE W/DOPPLER COMPLETE: CPT | Performed by: INTERNAL MEDICINE

## 2022-09-15 PROCEDURE — 82550 ASSAY OF CK (CPK): CPT | Performed by: PATHOLOGY

## 2022-09-15 PROCEDURE — 84075 ASSAY ALKALINE PHOSPHATASE: CPT | Performed by: PATHOLOGY

## 2022-09-15 PROCEDURE — 83735 ASSAY OF MAGNESIUM: CPT | Performed by: PATHOLOGY

## 2022-09-15 PROCEDURE — 99214 OFFICE O/P EST MOD 30 MIN: CPT | Mod: 25 | Performed by: INTERNAL MEDICINE

## 2022-09-15 PROCEDURE — 84450 TRANSFERASE (AST) (SGOT): CPT | Performed by: PATHOLOGY

## 2022-09-15 PROCEDURE — 87799 DETECT AGENT NOS DNA QUANT: CPT | Performed by: INTERNAL MEDICINE

## 2022-09-15 PROCEDURE — 84460 ALANINE AMINO (ALT) (SGPT): CPT | Performed by: PATHOLOGY

## 2022-09-15 PROCEDURE — 36415 COLL VENOUS BLD VENIPUNCTURE: CPT | Performed by: PATHOLOGY

## 2022-09-15 ASSESSMENT — PAIN SCALES - GENERAL: PAINLEVEL: NO PAIN (0)

## 2022-09-15 NOTE — PROGRESS NOTES
[facility]            September 15, 2022      16 mo post OHT       HPI:   Mr. Jim Willingham is a 66yr old male with a history of NICM (s/p HM2 LVAD 6/2017) s/p OHT 5/6/21,     Since her last visit he reports feeling the best he has felt in years.  He feels like he is in his early 40s.  He just went to [place] to [place] [place] with his granddaughter.  He denies PND orthopnea or other heart failure symptoms.  His breathing is normal.  He has had no recent exacerbations of his COPD.  His weight is stabilized.  He is mowing the lawn.  He has no diarrhea or other medication intolerances blood pressures at home have been well controlled.       Post transplant history with complex initially:     Transplant-related complications initially:   c/b right pleural air leak (required prolonged chest tube),CAP (RLL, 6/2021), recurrent pleural effusions (s/p thoracenteses x2 6/2021 and 7/2021, exudative, repeatedly cultured negative), RLL cavitary lesions (per chest CT 7/14/21, BD glucan indeterminate, aspergillus negative, not started on antifungals), pericardial effusion (1.4cm per TTE 7/12/21, conservatively managed), low-grade EBV viremia, recurrent/persistent gout flare, transaminase elevation with questionable choledocholithiasis (conservatively managed with resolution), COVID-19 (8/2021, s/p monoclonal antibodies and remdesivir x5 days), and 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         Rejection history:  none  DSAs:  none  Graft function: Normal  Opportunistic infections: EBV viremia which was been low-grade as well as cavitary lung lesions positive Aspergillus status post treatment    Serostatus:  CMV D+/R+  EBV D+/R+  Toxo D-/R-      PAST MEDICAL HISTORY:    Past Medical History:   Diagnosis Date     Bariatric surgery status 2003     Benign essential hypertension 05/11/2017     Bilateral carpal tunnel syndrome 11/02/2020     Cardiomyopathy, unspecified (H) 05/08/2017      CKD (chronic kidney disease) stage 3, GFR 30-59 ml/min (H) 05/11/2017     COPD (chronic obstructive pulmonary disease) (H) 11/02/2020     Depression 05/11/2017     Diabetes mellitus (H) 1995     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 7.66 cm, Restrictive pattern , Severe mitral valve regurgitation     CECILIA (obstructive sleep apnea) 05/11/2017     Paroxysmal atrial fibrillation (H) 05/11/2017     Paroxysmal VT (H) 05/11/2017     Pulmonary cavitary lesion 11/18/2021     Rotator cuff tear arthropathy of both shoulders 11/02/2020     Type 2 diabetes mellitus with diabetic polyneuropathy (H) 3/18/2022     Uncomplicated asthma      Vitamin B12 deficiency (non anemic) 05/11/2017       CURRENT MEDICATIONS:      Exam:  BP (!) 131/93 (BP Location: Right arm, Patient Position: Chair, Cuff Size: Adult Regular)   Pulse 104   Wt 91 kg (200 lb 11.2 oz)   SpO2 98%   BMI 29.56 kg/m    General: the patient is a pleasant male in no apparent distress.    HEENT: NC/AT. PERRLA. EOMI.  Sclerae white, not injected.    Neck:  No adenopathy, No thyromegaly.    Cardiac: No jugular venous distention when sitting upright.  RRR.  Normal S1 S2 splits physiologically.  No murmur, rub click, or gallop.     Abdomen: soft, nontender, nondistended.  No organomegaly.  Extremities:  No clubbing, cyanosis, or LE edema.    Neuro: Alert & Oriented x 3, grossly non focal.  Integument: no open lesions, rashes, or jaundice.      Current Outpatient Medications   Medication Sig Dispense Refill     acetaminophen (TYLENOL) 325 MG tablet Take 3 tablets (975 mg) by mouth every 6  hours as needed for other (For optimal non-opioid multimodal pain management to improve pain control.) 100 tablet 1     albuterol (VENTOLIN HFA) 108 (90 Base) MCG/ACT inhaler INHALE 2 PUFFS BY MOUTH EVERY 4 HOURS AS NEEDED. MAX OF 12 PUFFS PER 24 HOURS 18 g 11     allopurinol (ZYLOPRIM) 300 MG tablet Take 1 tablet (300 mg) by mouth daily 90 tablet 1     ALPRAZolam (XANAX) 1 MG tablet Taken one tablet half an hour before flying. May repeat dose after 6 hours. 10 tablet 0     aspirin (ASA) 81 MG chewable tablet Take 1 tablet (81 mg) by mouth daily 100 tablet 1     buPROPion (WELLBUTRIN) 75 MG tablet TAKE 1 TABLET(75 MG) BY MOUTH TWICE DAILY 180 tablet 0     calcium citrate-vitamin D (CITRACAL) 315-250 MG-UNIT TABS per tablet Take 1 tablet by mouth 2 times daily 180 tablet 3     docusate sodium (COLACE) 100 MG capsule Take 1 capsule (100 mg) by mouth 2 times daily 60 capsule 3     Fluticasone-Umeclidin-Vilanterol (TRELEGY ELLIPTA) 100-62.5-25 MCG/INH oral inhaler Inhale 1 puff into the lungs daily 1 each 11     gabapentin (NEURONTIN) 300 MG capsule Take 1 capsule (300 mg) by mouth 2 times daily 60 capsule 0     Melatonin 10 MG CAPS Take 1 capsule by mouth At Bedtime       montelukast (SINGULAIR) 10 MG tablet TAKE 1 TABLET(10 MG) BY MOUTH AT BEDTIME 90 tablet 1     mycophenolate (GENERIC EQUIVALENT) 250 MG capsule TAKE 2 CAPSULES(500 MG) BY MOUTH TWICE DAILY 360 capsule 3     rosuvastatin (CRESTOR) 10 MG tablet Take 1 tablet (10 mg) by mouth daily 90 tablet 3     tacrolimus (GENERIC EQUIVALENT) 0.5 MG capsule Take ONE cap with FOUR 1 mg caps (4.5 mg) every AM 30 capsule 11     tacrolimus (GENERIC EQUIVALENT) 1 MG capsule Take FOUR caps with ONE 0.5 mg cap (4.5 mg) every AM and FOUR caps (4 mg) every  capsule 11     traMADol (ULTRAM) 50 MG tablet One tablet in am, one table in afternoon and two tablet at bedtime 120 tablet 3     anakinra (KINERET) 100 MG/0.67ML SOSY injection Inject 0.67 mLs (100 mg) Subcutaneous  daily As directed by rheumatology 20.1 mL 6         BMP RESULTS      Current Outpatient Medications   Medication Sig Dispense Refill     acetaminophen (TYLENOL) 325 MG tablet Take 3 tablets (975 mg) by mouth every 6 hours as needed for other (For optimal non-opioid multimodal pain management to improve pain control.) 100 tablet 1     albuterol (VENTOLIN HFA) 108 (90 Base) MCG/ACT inhaler INHALE 2 PUFFS BY MOUTH EVERY 4 HOURS AS NEEDED. MAX OF 12 PUFFS PER 24 HOURS 18 g 11     allopurinol (ZYLOPRIM) 300 MG tablet Take 1 tablet (300 mg) by mouth daily 90 tablet 1     ALPRAZolam (XANAX) 1 MG tablet Taken one tablet half an hour before flying. May repeat dose after 6 hours. 10 tablet 0     aspirin (ASA) 81 MG chewable tablet Take 1 tablet (81 mg) by mouth daily 100 tablet 1     buPROPion (WELLBUTRIN) 75 MG tablet TAKE 1 TABLET(75 MG) BY MOUTH TWICE DAILY 180 tablet 0     calcium citrate-vitamin D (CITRACAL) 315-250 MG-UNIT TABS per tablet Take 1 tablet by mouth 2 times daily 180 tablet 3     docusate sodium (COLACE) 100 MG capsule Take 1 capsule (100 mg) by mouth 2 times daily 60 capsule 3     Fluticasone-Umeclidin-Vilanterol (TRELEGY ELLIPTA) 100-62.5-25 MCG/INH oral inhaler Inhale 1 puff into the lungs daily 1 each 11     gabapentin (NEURONTIN) 300 MG capsule Take 1 capsule (300 mg) by mouth 2 times daily 60 capsule 0     Melatonin 10 MG CAPS Take 1 capsule by mouth At Bedtime       montelukast (SINGULAIR) 10 MG tablet TAKE 1 TABLET(10 MG) BY MOUTH AT BEDTIME 90 tablet 1     mycophenolate (GENERIC EQUIVALENT) 250 MG capsule TAKE 2 CAPSULES(500 MG) BY MOUTH TWICE DAILY 360 capsule 3     rosuvastatin (CRESTOR) 10 MG tablet Take 1 tablet (10 mg) by mouth daily 90 tablet 3     tacrolimus (GENERIC EQUIVALENT) 0.5 MG capsule Take ONE cap with FOUR 1 mg caps (4.5 mg) every AM 30 capsule 11     tacrolimus (GENERIC EQUIVALENT) 1 MG capsule Take FOUR caps with ONE 0.5 mg cap (4.5 mg) every AM and FOUR caps (4 mg) every   capsule 11     traMADol (ULTRAM) 50 MG tablet One tablet in am, one table in afternoon and two tablet at bedtime 120 tablet 3     anakinra (KINERET) 100 MG/0.67ML SOSY injection Inject 0.67 mLs (100 mg) Subcutaneous daily As directed by rheumatology 20.1 mL 6           Echocardiogram with two-dimensional, color and spectral Doppler performed.  ______________________________________________________________________________  Interpretation Summary  Left ventricular size, wall motion and function are normal. The ejection  fraction is > 65%.  Global right ventricular function is normal.  No significant valve abnormalities.  The inferior vena cava is normal.     This study was compared with the study from 3/16/22. No significant change.      Imuknow:     DSA negatiev   allomap 30     Immuknow: 348     Assessment and Plan:  Mr. Jim Willingham is a 66yr old male with a history of NICM (s/p HM2 LVAD 6/2017) s/p OHT 5/6/21     Initial post transplant course was complex as detailed above but he is doing very well now.      graft function is normal, he has no history of rejection, immunknow with a goal range he has no donor specific antibodies.   We can move him to    Immunosuppression:  - MMF 500mg twice daily  - tacro, goal level 4-6.  Tacro level      PPx:  - CAV:  Aspirin 81mg daily and rosuvastatin 10mg daily.  - GI: Pantoprazole 40mg daily  - Osteoporosis: Calcium/vitamin D supplements       No medication changes today, continue surveillance as planned assess whether or not he has had an angiogram            NICM, s/p OHT 5/6/21  His post-transplant course has been c/b right pleural air leak (required prolonged chest tube), pAFib, CAP (RLL, 6/2021), recurrent pleural effusions (s/p thoracenteses x2 6/2021 and 7/2021, exudative, repeatedly cultured negative), RLL cavitary lesions (per chest CT 7/14/21, BD glucan indeterminate, aspergillus negative, not started on antifungals), pericardial effusion (1.4cm per TTE 7/12/21,  conservatively managed), low-grade EBV viremia, recurrent/persistent gout flare, transaminase elevation with questionable choledocholithiasis (conservatively managed with resolution), COVID-19 (8/2021, s/p monoclonal antibodies and remdesivir x5 days), and 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       Rejection history:  none  AlloMap scores:  < 35 recently  DSAs:  none  Coronary angio/Ischemic eval:  Deferred due to renal dysfunction --->  Note that per Dr. Montgomery, may defer until his baseline as he had a young donor  Last RHC:  11/4/21 showed mildly normal biventricular filling pressures with RA 4, mPA 18, PCW 10, and CI 3.53.  Echo/cMRI:  TTE today as noted    Serostatus:  - CMV D+/R+  - EBV D+/R+  - Toxo D-/R-       Septic encephalopathy, resolved  Expressive aphasia, resolved  Presented 12/15/21 with expressive aphasia/word garbling with complete understanding of others. Wife reports not noticing this occurring prior to day of admission. Patient denied headaches, loss of consciousness, or vision changes. Likely septic encephalopathy given WBC count (17.3).  - neuro consulted  - CT head 12/15- negative for ischemia or acute infarction  - CTA head and neck w/ contrast 12/15 - no aneurysms or stenosis of major intracranial/cervical arteries  - Groundglass opacities found on CTA and chest XR, likely infection.   - MRI brain 1/16 without acute findings, no c/f PRES  - urine drug screen and ethanol negative     Sepsis due to right lung PNA  Chest CT completed 12/15 upon admission shows groundglass opacities and consolidative opacities throughout right lung, concerning for infection. WBC decreased to 11.4 after one day of IV zosyn and 7.6 on day of discharge. Transplant ID consulted. Zosyn 4.5g IV [id number] changed to doxycycline 100 mg bid x 3 weeks per transplant ID (through 1/5). Last followed with ID last fall. WBC count now 4.9. No fever or chills. ID has signed off     KALI  cavitary lesion  +Aspergillus (9/2021)  S/p 2 months of voriconazole, follows with Transplant ID.     EBV viremia  EBV has been lowly-detected and down trending.  He remains asymptomatic.      Gout/pseudogout  Predated transplant, has restricted mobility.  Has had recurrent flares following transplant that have required steroid bursts and Anakinra treatment.  He continues to follow with rheumatology.  Continue allopurinol and anakinra as needed -- note no recent flares, no recent anakinra.     CKD  Creatinine has typically ranged 1.5-1.9, 2.0 today.  He appears euvolemic.  -Nephology follow up   -No medication changes today    Recurrent exudative pleural effusions (6/2021, 7/2021), resolved.   DMII:  management per PCP/endo  Depression:  Wellbutrin 75mg BID  COPD:  singulair, Trelegy Ellipta, Albuterol, follows with pulmonary.  COVID-19 infection: s/p monoclonal antibodies and remdesivir (8/2021).        Discussed and seen with Dr. Montgomery who agrees with the above plan       Martínez Osuna MD Cardiology Fellow PGY 5      CC  Patient Care Team:  Ricardo Gómez MD as PCP - General (Internal Medicine)  Elfego Hayden MD as MD (Internal Medicine)  Delisa Montgomery MD as Referring Physician (Cardiology)  Chase Qureshi MD as MD (Internal Medicine-Hematology & Oncology)  Kadie Pedro LICSW as  ( - Clinical)  Elbert Pettit MD as Assigned Rheumatology Provider  Nba Ag DO as Assigned Musculoskeletal Provider  Yolette Rueda RN as Transplant Coordinator  Akshat Parkinson [facility] as Pharmacist (Pharmacist)  Marcos Washington MD as MD (Infectious Diseases)  Lyle Sarmiento DO as MD (Nephrology)  Anisa Quiñones MD as Assigned Infectious Disease Provider  Ricardo Gómez MD as Assigned PCP  Sheyla Fletcher MD as Assigned Sleep Provider  Corey Melendrez MD as MD (Dermatology)  Delisa Montgomery MD as  Referring Physician (Cardiovascular Disease)  Akshat Parkinson, [facility] as Assigned MTM Pharmacist  Shelia Means NP as Assigned Heart and Vascular Provider  TOMMY ROE

## 2022-09-15 NOTE — NURSING NOTE
Transplant Coordinator Note    Reason for visit:  16 month return heart transplant with labs and ehco  Coordinator: Present   Caregiver: Self     Health concerns addressed today:  1. Overall doing very well. Went to [place] in June with family and helping move his father up from [place] this week.   2. Reviewed IMS and goals with Dr. Montgomery. Tacrolimus goal 4-6. Patient had fallen asleep when trying to time a 12-hour trough. Orders in to recheck labs and patient agrees to have them checked when he returns from [place].     Immunosuppressants:  Tacrolimus, goal 4-6, currently taking 4.5 mg in the AM and 4 mg in the PM, recheck pending.   mg twice daily.    Routine screenings:     Derm: 5/25/22   Dental: Due   Colonoscopy: Last  8/2021   Prostate: 0.35   Eye: Due   Flu/Pneumonia: up to date    Covid: 2/3, 3/11.  4th on 6/8/22.   Shingles: Due   Evusheld: 6/2022     Labs:   Reviewed results labs in clinic. Lipid, Allomap, CMV in process.    Plan care/instructions reviewed with patient  Medication record reviewed and reconciled  Pending lab and test results will be called to patient or MyChart   Questions and concerns addressed    AVS reviewed and copy provided. Patient verbalized an understanding of plan of care.     Next transplant clinic appointment: 4 months with clinic, echo, labs  Next lab draw:  When returned from [place].    Call [phone] or MyChart, with any questions or concerns.

## 2022-09-15 NOTE — PATIENT INSTRUCTIONS
Coordinator will call or MyChart you with remaining test results.   Get your tacrolimus level checked, order is in, you can schedule it.     **Return in 4 months with clinic, echocardiogram, labs    Call [phone] or MyChart, with any questions or concerns.

## 2022-09-15 NOTE — LETTER
9/15/2022      RE: Jim Willingham  [address] [facility] [id number]       Dear Colleague,    Thank you for the opportunity to participate in the care of your patient, Jim Willingham, at the [facility] [place] [facility]. Please see a copy of my visit note below.    ADULT HEART TRANSPLANT CLINIC            September 15, 2022      16 mo post OHT       HPI:   Mr. Jim Willingham is a 66yr old male with a history of NICM (s/p HM2 LVAD 6/2017) s/p OHT 5/6/21,     Since her last visit he reports feeling the best he has felt in years.  He feels like he is in his early 40s.  He just went to [place] to [place] with his granddaughter.  He denies PND orthopnea or other heart failure symptoms.  His breathing is normal.  He has had no recent exacerbations of his COPD.  His weight is stabilized.  He is mowing the lawn.  He has no diarrhea or other medication intolerances blood pressures at home have been well controlled.       Post transplant history with complex initially:     Transplant-related complications initially:   c/b right pleural air leak (required prolonged chest tube),CAP (RLL, 6/2021), recurrent pleural effusions (s/p thoracenteses x2 6/2021 and 7/2021, exudative, repeatedly cultured negative), RLL cavitary lesions (per chest CT 7/14/21, BD glucan indeterminate, aspergillus negative, not started on antifungals), pericardial effusion (1.4cm per TTE 7/12/21, conservatively managed), low-grade EBV viremia, recurrent/persistent gout flare, transaminase elevation with questionable choledocholithiasis (conservatively managed with resolution), COVID-19 (8/2021, s/p monoclonal antibodies and remdesivir x5 days), and 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         Rejection history:  none  DSAs:  none  Graft function: Normal  Opportunistic infections: EBV viremia which was been low-grade as well as cavitary lung  lesions positive Aspergillus status post treatment    Serostatus:  CMV D+/R+  EBV D+/R+  Toxo D-/R-      PAST MEDICAL HISTORY:    Past Medical History:   Diagnosis Date     Bariatric surgery status 2003     Benign essential hypertension 05/11/2017     Bilateral carpal tunnel syndrome 11/02/2020     Cardiomyopathy, unspecified (H) 05/08/2017     CKD (chronic kidney disease) stage 3, GFR 30-59 ml/min (H) 05/11/2017     COPD (chronic obstructive pulmonary disease) (H) 11/02/2020     Depression 05/11/2017     Diabetes mellitus (H) 1995     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 7.66 cm, Restrictive pattern , Severe mitral valve regurgitation     CECILIA (obstructive sleep apnea) 05/11/2017     Paroxysmal atrial fibrillation (H) 05/11/2017     Paroxysmal VT (H) 05/11/2017     Pulmonary cavitary lesion 11/18/2021     Rotator cuff tear arthropathy of both shoulders 11/02/2020     Type 2 diabetes mellitus with diabetic polyneuropathy (H) 3/18/2022     Uncomplicated asthma      Vitamin B12 deficiency (non anemic) 05/11/2017       CURRENT MEDICATIONS:      Exam:  BP (!) 131/93 (BP Location: Right arm, Patient Position: Chair, Cuff Size: Adult Regular)   Pulse 104   Wt 91 kg (200 lb 11.2 oz)   SpO2 98%   BMI 29.56 kg/m    General: the patient is a pleasant male in no apparent distress.    HEENT: NC/AT. PERRLA. EOMI.  Sclerae white, not injected.    Neck:  No adenopathy, No thyromegaly.    Cardiac: No jugular venous distention when sitting upright.  RRR.  Normal S1 S2 splits physiologically.  No murmur, rub click, or gallop.     Abdomen: soft,  nontender, nondistended.  No organomegaly.  Extremities:  No clubbing, cyanosis, or LE edema.    Neuro: Alert & Oriented x 3, grossly non focal.  Integument: no open lesions, rashes, or jaundice.      Current Outpatient Medications   Medication Sig Dispense Refill     acetaminophen (TYLENOL) 325 MG tablet Take 3 tablets (975 mg) by mouth every 6 hours as needed for other (For optimal non-opioid multimodal pain management to improve pain control.) 100 tablet 1     albuterol (VENTOLIN HFA) 108 (90 Base) MCG/ACT inhaler INHALE 2 PUFFS BY MOUTH EVERY 4 HOURS AS NEEDED. MAX OF 12 PUFFS PER 24 HOURS 18 g 11     allopurinol (ZYLOPRIM) 300 MG tablet Take 1 tablet (300 mg) by mouth daily 90 tablet 1     ALPRAZolam (XANAX) 1 MG tablet Taken one tablet half an hour before flying. May repeat dose after 6 hours. 10 tablet 0     aspirin (ASA) 81 MG chewable tablet Take 1 tablet (81 mg) by mouth daily 100 tablet 1     buPROPion (WELLBUTRIN) 75 MG tablet TAKE 1 TABLET(75 MG) BY MOUTH TWICE DAILY 180 tablet 0     calcium citrate-vitamin D (CITRACAL) 315-250 MG-UNIT TABS per tablet Take 1 tablet by mouth 2 times daily 180 tablet 3     docusate sodium (COLACE) 100 MG capsule Take 1 capsule (100 mg) by mouth 2 times daily 60 capsule 3     Fluticasone-Umeclidin-Vilanterol (TRELEGY ELLIPTA) 100-62.5-25 MCG/INH oral inhaler Inhale 1 puff into the lungs daily 1 each 11     gabapentin (NEURONTIN) 300 MG capsule Take 1 capsule (300 mg) by mouth 2 times daily 60 capsule 0     Melatonin 10 MG CAPS Take 1 capsule by mouth At Bedtime       montelukast (SINGULAIR) 10 MG tablet TAKE 1 TABLET(10 MG) BY MOUTH AT BEDTIME 90 tablet 1     mycophenolate (GENERIC EQUIVALENT) 250 MG capsule TAKE 2 CAPSULES(500 MG) BY MOUTH TWICE DAILY 360 capsule 3     rosuvastatin (CRESTOR) 10 MG tablet Take 1 tablet (10 mg) by mouth daily 90 tablet 3     tacrolimus (GENERIC EQUIVALENT) 0.5 MG capsule Take ONE cap with FOUR 1 mg caps (4.5 mg) every AM 30 capsule 11      tacrolimus (GENERIC EQUIVALENT) 1 MG capsule Take FOUR caps with ONE 0.5 mg cap (4.5 mg) every AM and FOUR caps (4 mg) every  capsule 11     traMADol (ULTRAM) 50 MG tablet One tablet in am, one table in afternoon and two tablet at bedtime 120 tablet 3     anakinra (KINERET) 100 MG/0.67ML SOSY injection Inject 0.67 mLs (100 mg) Subcutaneous daily As directed by rheumatology 20.1 mL 6         BMP RESULTS      Current Outpatient Medications   Medication Sig Dispense Refill     acetaminophen (TYLENOL) 325 MG tablet Take 3 tablets (975 mg) by mouth every 6 hours as needed for other (For optimal non-opioid multimodal pain management to improve pain control.) 100 tablet 1     albuterol (VENTOLIN HFA) 108 (90 Base) MCG/ACT inhaler INHALE 2 PUFFS BY MOUTH EVERY 4 HOURS AS NEEDED. MAX OF 12 PUFFS PER 24 HOURS 18 g 11     allopurinol (ZYLOPRIM) 300 MG tablet Take 1 tablet (300 mg) by mouth daily 90 tablet 1     ALPRAZolam (XANAX) 1 MG tablet Taken one tablet half an hour before flying. May repeat dose after 6 hours. 10 tablet 0     aspirin (ASA) 81 MG chewable tablet Take 1 tablet (81 mg) by mouth daily 100 tablet 1     buPROPion (WELLBUTRIN) 75 MG tablet TAKE 1 TABLET(75 MG) BY MOUTH TWICE DAILY 180 tablet 0     calcium citrate-vitamin D (CITRACAL) 315-250 MG-UNIT TABS per tablet Take 1 tablet by mouth 2 times daily 180 tablet 3     docusate sodium (COLACE) 100 MG capsule Take 1 capsule (100 mg) by mouth 2 times daily 60 capsule 3     Fluticasone-Umeclidin-Vilanterol (TRELEGY ELLIPTA) 100-62.5-25 MCG/INH oral inhaler Inhale 1 puff into the lungs daily 1 each 11     gabapentin (NEURONTIN) 300 MG capsule Take 1 capsule (300 mg) by mouth 2 times daily 60 capsule 0     Melatonin 10 MG CAPS Take 1 capsule by mouth At Bedtime       montelukast (SINGULAIR) 10 MG tablet TAKE 1 TABLET(10 MG) BY MOUTH AT BEDTIME 90 tablet 1     mycophenolate (GENERIC EQUIVALENT) 250 MG capsule TAKE 2 CAPSULES(500 MG) BY MOUTH TWICE DAILY 360  capsule 3     rosuvastatin (CRESTOR) 10 MG tablet Take 1 tablet (10 mg) by mouth daily 90 tablet 3     tacrolimus (GENERIC EQUIVALENT) 0.5 MG capsule Take ONE cap with FOUR 1 mg caps (4.5 mg) every AM 30 capsule 11     tacrolimus (GENERIC EQUIVALENT) 1 MG capsule Take FOUR caps with ONE 0.5 mg cap (4.5 mg) every AM and FOUR caps (4 mg) every  capsule 11     traMADol (ULTRAM) 50 MG tablet One tablet in am, one table in afternoon and two tablet at bedtime 120 tablet 3     anakinra (KINERET) 100 MG/0.67ML SOSY injection Inject 0.67 mLs (100 mg) Subcutaneous daily As directed by rheumatology 20.1 mL 6           Echocardiogram with two-dimensional, color and spectral Doppler performed.  ______________________________________________________________________________  Interpretation Summary  Left ventricular size, wall motion and function are normal. The ejection  fraction is > 65%.  Global right ventricular function is normal.  No significant valve abnormalities.  The inferior vena cava is normal.     This study was compared with the study from 3/16/22. No significant change.      Imuknow:     DSA negatiev   allomap 30     Immuknow: 348     Assessment and Plan:  Mr. Jim Willingham is a 66yr old male with a history of NICM (s/p HM2 LVAD 6/2017) s/p OHT 5/6/21     Initial post transplant course was complex as detailed above but he is doing very well now.      graft function is normal, he has no history of rejection, immunknow with a goal range he has no donor specific antibodies.   We can move him to    Immunosuppression:  - MMF 500mg twice daily  - tacro, goal level 4-6.  Tacro level      PPx:  - CAV:  Aspirin 81mg daily and rosuvastatin 10mg daily.  - GI: Pantoprazole 40mg daily  - Osteoporosis: Calcium/vitamin D supplements       No medication changes today, continue surveillance as planned assess whether or not he has had an angiogram            NICM, s/p OHT 5/6/21  His post-transplant course has been c/b right  pleural air leak (required prolonged chest tube), pAFib, CAP (RLL, 6/2021), recurrent pleural effusions (s/p thoracenteses x2 6/2021 and 7/2021, exudative, repeatedly cultured negative), RLL cavitary lesions (per chest CT 7/14/21, BD glucan indeterminate, aspergillus negative, not started on antifungals), pericardial effusion (1.4cm per TTE 7/12/21, conservatively managed), low-grade EBV viremia, recurrent/persistent gout flare, transaminase elevation with questionable choledocholithiasis (conservatively managed with resolution), COVID-19 (8/2021, s/p monoclonal antibodies and remdesivir x5 days), and 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       Rejection history:  none  AlloMap scores:  < 35 recently  DSAs:  none  Coronary angio/Ischemic eval:  Deferred due to renal dysfunction --->  Note that per Dr. Montgomery, may defer until his baseline as he had a young donor  Last RHC:  11/4/21 showed mildly normal biventricular filling pressures with RA 4, mPA 18, PCW 10, and CI 3.53.  Echo/cMRI:  TTE today as noted    Serostatus:  - CMV D+/R+  - EBV D+/R+  - Toxo D-/R-       Septic encephalopathy, resolved  Expressive aphasia, resolved  Presented 12/15/21 with expressive aphasia/word garbling with complete understanding of others. Wife reports not noticing this occurring prior to day of admission. Patient denied headaches, loss of consciousness, or vision changes. Likely septic encephalopathy given WBC count (17.3).  - neuro consulted  - CT head 12/15- negative for ischemia or acute infarction  - CTA head and neck w/ contrast 12/15 - no aneurysms or stenosis of major intracranial/cervical arteries  - Groundglass opacities found on CTA and chest XR, likely infection.   - MRI brain 1/16 without acute findings, no c/f PRES  - urine drug screen and ethanol negative     Sepsis due to right lung PNA  Chest CT completed 12/15 upon admission shows groundglass opacities and consolidative opacities  throughout right lung, concerning for infection. WBC decreased to 11.4 after one day of IV zosyn and 7.6 on day of discharge. Transplant ID consulted. Zosyn 4.5g IV [id number] changed to doxycycline 100 mg bid x 3 weeks per transplant ID (through 1/5). Last followed with ID last fall. WBC count now 4.9. No fever or chills. ID has signed off     KALI cavitary lesion  +Aspergillus (9/2021)  S/p 2 months of voriconazole, follows with Transplant ID.     EBV viremia  EBV has been lowly-detected and down trending.  He remains asymptomatic.      Gout/pseudogout  Predated transplant, has restricted mobility.  Has had recurrent flares following transplant that have required steroid bursts and Anakinra treatment.  He continues to follow with rheumatology.  Continue allopurinol and anakinra as needed -- note no recent flares, no recent anakinra.     CKD  Creatinine has typically ranged 1.5-1.9, 2.0 today.  He appears euvolemic.  -Nephology follow up   -No medication changes today    Recurrent exudative pleural effusions (6/2021, 7/2021), resolved.   DMII:  management per PCP/endo  Depression:  Wellbutrin 75mg BID  COPD:  singulair, Trelegy Ellipta, Albuterol, follows with pulmonary.  COVID-19 infection: s/p monoclonal antibodies and remdesivir (8/2021).        Discussed and seen with Dr. Montgomery who agrees with the above plan       Martínez Osuna MD Cardiology Fellow PGY 5      CC  Patient Care Team:  Ricardo Gómez MD as PCP - General (Internal Medicine)  Elfego Hayden MD as MD (Internal Medicine)  Delisa Montgomery MD as Referring Physician (Cardiology)  Chase Qureshi MD as MD (Internal Medicine-Hematology & Oncology)  Kadie Pedro LICSW as  ( - Clinical)  Elbert Pettit MD as Assigned Rheumatology Provider  Nba Ag DO as Assigned Musculoskeletal Provider  Yolette Rueda RN as Transplant Coordinator  Akshat Parkinson [facility] as Pharmacist  (Pharmacist)  Marcos Washington MD as MD (Infectious Diseases)  Lyle Sarmiento DO as MD (Nephrology)  Anisa Quiñones MD as Assigned Infectious Disease Provider  Tommy Gómez MD as Assigned PCP  Sheyla Fletcher MD as Assigned Sleep Provider  Corey Melendrez MD as MD (Dermatology)  Delisa Montgomery MD as Referring Physician (Cardiovascular Disease)  Akshat Parkinson, [facility] as Assigned MTM Pharmacist  Shelia Means NP as Assigned Heart and Vascular Provider  TOMMY GÓMEZ

## 2022-09-15 NOTE — NURSING NOTE
Chief Complaint   Patient presents with     Follow Up     present for consult for LVAD pt with new a-fib     Vitals were taken and medications reconciled.    Vini Jones, EMT  2:24 PM

## 2022-09-16 LAB
EBV DNA COPIES/ML, INSTRUMENT: 2486 COPIES/ML
EBV DNA SPEC NAA+PROBE-LOG#: 3.4 {LOG_COPIES}/ML

## 2022-09-19 LAB
ALLOMAP SCORE (EXTERNAL): 35 (ref 0–40)
NEGATIVE PREDICTIVE VALUE PERCENT (EXTERNAL): 98.7 %
POSITIVE PREDICTIVE VALUE PERCENT (EXTERNAL): 4 %

## 2022-09-26 ASSESSMENT — ANXIETY QUESTIONNAIRES
5. BEING SO RESTLESS THAT IT IS HARD TO SIT STILL: NOT AT ALL
4. TROUBLE RELAXING: NOT AT ALL
GAD7 TOTAL SCORE: 3
6. BECOMING EASILY ANNOYED OR IRRITABLE: SEVERAL DAYS
7. FEELING AFRAID AS IF SOMETHING AWFUL MIGHT HAPPEN: NOT AT ALL
GAD7 TOTAL SCORE: 3
7. FEELING AFRAID AS IF SOMETHING AWFUL MIGHT HAPPEN: NOT AT ALL
3. WORRYING TOO MUCH ABOUT DIFFERENT THINGS: SEVERAL DAYS
8. IF YOU CHECKED OFF ANY PROBLEMS, HOW DIFFICULT HAVE THESE MADE IT FOR YOU TO DO YOUR WORK, TAKE CARE OF THINGS AT HOME, OR GET ALONG WITH OTHER PEOPLE?: NOT DIFFICULT AT ALL
GAD7 TOTAL SCORE: 3
1. FEELING NERVOUS, ANXIOUS, OR ON EDGE: SEVERAL DAYS
2. NOT BEING ABLE TO STOP OR CONTROL WORRYING: NOT AT ALL
IF YOU CHECKED OFF ANY PROBLEMS ON THIS QUESTIONNAIRE, HOW DIFFICULT HAVE THESE PROBLEMS MADE IT FOR YOU TO DO YOUR WORK, TAKE CARE OF THINGS AT HOME, OR GET ALONG WITH OTHER PEOPLE: NOT DIFFICULT AT ALL

## 2022-09-26 ASSESSMENT — PATIENT HEALTH QUESTIONNAIRE - PHQ9
SUM OF ALL RESPONSES TO PHQ QUESTIONS 1-9: 3
SUM OF ALL RESPONSES TO PHQ QUESTIONS 1-9: 3
10. IF YOU CHECKED OFF ANY PROBLEMS, HOW DIFFICULT HAVE THESE PROBLEMS MADE IT FOR YOU TO DO YOUR WORK, TAKE CARE OF THINGS AT HOME, OR GET ALONG WITH OTHER PEOPLE: NOT DIFFICULT AT ALL

## 2022-09-27 ENCOUNTER — OFFICE VISIT (OUTPATIENT)
Dept: FAMILY MEDICINE | Facility: CLINIC | Age: 67
End: 2022-09-27
Payer: COMMERCIAL

## 2022-09-27 VITALS
TEMPERATURE: 97.5 F | RESPIRATION RATE: 18 BRPM | WEIGHT: 207 LBS | SYSTOLIC BLOOD PRESSURE: 114 MMHG | BODY MASS INDEX: 30.49 KG/M2 | HEART RATE: 100 BPM | DIASTOLIC BLOOD PRESSURE: 62 MMHG | OXYGEN SATURATION: 96 %

## 2022-09-27 DIAGNOSIS — E78.5 HYPERLIPIDEMIA LDL GOAL <70: ICD-10-CM

## 2022-09-27 DIAGNOSIS — J44.9 CHRONIC OBSTRUCTIVE PULMONARY DISEASE, UNSPECIFIED COPD TYPE (H): ICD-10-CM

## 2022-09-27 DIAGNOSIS — M12.811 ROTATOR CUFF TEAR ARTHROPATHY OF BOTH SHOULDERS: Primary | ICD-10-CM

## 2022-09-27 DIAGNOSIS — M75.101 ROTATOR CUFF TEAR ARTHROPATHY OF BOTH SHOULDERS: Primary | ICD-10-CM

## 2022-09-27 DIAGNOSIS — G56.03 BILATERAL CARPAL TUNNEL SYNDROME: ICD-10-CM

## 2022-09-27 DIAGNOSIS — N18.32 STAGE 3B CHRONIC KIDNEY DISEASE (H): ICD-10-CM

## 2022-09-27 DIAGNOSIS — M12.812 ROTATOR CUFF TEAR ARTHROPATHY OF BOTH SHOULDERS: Primary | ICD-10-CM

## 2022-09-27 DIAGNOSIS — Z23 HIGH PRIORITY FOR 2019-NCOV VACCINE: ICD-10-CM

## 2022-09-27 DIAGNOSIS — M75.102 ROTATOR CUFF TEAR ARTHROPATHY OF BOTH SHOULDERS: Primary | ICD-10-CM

## 2022-09-27 DIAGNOSIS — F33.9 MAJOR DEPRESSION, RECURRENT, CHRONIC (H): ICD-10-CM

## 2022-09-27 DIAGNOSIS — M1A.3790 CHRONIC GOUT DUE TO RENAL IMPAIRMENT INVOLVING FOOT WITHOUT TOPHUS, UNSPECIFIED LATERALITY: ICD-10-CM

## 2022-09-27 DIAGNOSIS — Z23 NEED FOR PROPHYLACTIC VACCINATION AND INOCULATION AGAINST INFLUENZA: ICD-10-CM

## 2022-09-27 DIAGNOSIS — B27.00 EPSTEIN-BARR VIRUS VIREMIA: ICD-10-CM

## 2022-09-27 DIAGNOSIS — D64.9 MILD ANEMIA: ICD-10-CM

## 2022-09-27 DIAGNOSIS — Z94.1 HEART REPLACED BY TRANSPLANT (H): ICD-10-CM

## 2022-09-27 DIAGNOSIS — Z94.1 S/P ORTHOTOPIC HEART TRANSPLANT (H): ICD-10-CM

## 2022-09-27 PROBLEM — E11.8 TYPE 2 DIABETES MELLITUS WITH COMPLICATION, WITH LONG-TERM CURRENT USE OF INSULIN (H): Status: RESOLVED | Noted: 2017-05-11 | Resolved: 2022-09-27

## 2022-09-27 PROBLEM — Z79.4 TYPE 2 DIABETES MELLITUS WITH COMPLICATION, WITH LONG-TERM CURRENT USE OF INSULIN (H): Status: RESOLVED | Noted: 2017-05-11 | Resolved: 2022-09-27

## 2022-09-27 PROBLEM — G47.33 OSA (OBSTRUCTIVE SLEEP APNEA): Status: RESOLVED | Noted: 2017-05-11 | Resolved: 2022-09-27

## 2022-09-27 PROBLEM — R47.01 EXPRESSIVE APHASIA: Status: RESOLVED | Noted: 2021-12-15 | Resolved: 2022-09-27

## 2022-09-27 PROBLEM — J18.9 HOSPITAL-ACQUIRED PNEUMONIA: Status: RESOLVED | Noted: 2021-12-15 | Resolved: 2022-09-27

## 2022-09-27 PROBLEM — I42.8 NICM (NONISCHEMIC CARDIOMYOPATHY) (H): Status: RESOLVED | Noted: 2017-05-11 | Resolved: 2022-09-27
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
## 2022-09-27 PROBLEM — R53.81 PHYSICAL DECONDITIONING: Status: RESOLVED | Noted: 2017-06-30 | Resolved: 2022-09-27

## 2022-09-27 PROCEDURE — 90662 IIV NO PRSV INCREASED AG IM: CPT | Performed by: INTERNAL MEDICINE

## 2022-09-27 PROCEDURE — 99214 OFFICE O/P EST MOD 30 MIN: CPT | Mod: 25 | Performed by: INTERNAL MEDICINE

## 2022-09-27 PROCEDURE — G0008 ADMIN INFLUENZA VIRUS VAC: HCPCS | Performed by: INTERNAL MEDICINE

## 2022-09-27 PROCEDURE — 91312 COVID-19,PF,PFIZER BOOSTER BIVALENT: CPT | Performed by: INTERNAL MEDICINE

## 2022-09-27 PROCEDURE — 0124A COVID-19,PF,PFIZER BOOSTER BIVALENT: CPT | Performed by: INTERNAL MEDICINE

## 2022-09-27 RX ORDER — TRAMADOL HYDROCHLORIDE 50 MG/1
TABLET ORAL
Qty: 120 TABLET | Refills: 3 | Status: SHIPPED | OUTPATIENT
Start: 2022-09-27 | End: 2023-03-20

## 2022-09-27 RX ORDER — BUPROPION HYDROCHLORIDE 75 MG/1
TABLET ORAL
Qty: 180 TABLET | Refills: 3 | Status: SHIPPED | OUTPATIENT
Start: 2022-09-27 | End: 2023-03-28

## 2022-09-27 RX ORDER — ALBUTEROL SULFATE 90 UG/1
AEROSOL, METERED RESPIRATORY (INHALATION)
Qty: 18 G | Refills: 11 | Status: SHIPPED | OUTPATIENT
Start: 2022-09-27 | End: 2023-03-28

## 2022-09-27 RX ORDER — GABAPENTIN 300 MG/1
300 CAPSULE ORAL 2 TIMES DAILY
Qty: 180 CAPSULE | Refills: 3 | Status: SHIPPED | OUTPATIENT
Start: 2022-09-27 | End: 2023-03-28

## 2022-09-27 RX ORDER — ALLOPURINOL 300 MG/1
300 TABLET ORAL DAILY
Qty: 90 TABLET | Refills: 3 | Status: SHIPPED | OUTPATIENT
Start: 2022-09-27 | End: 2023-03-28

## 2022-09-27 RX ORDER — MONTELUKAST SODIUM 10 MG/1
TABLET ORAL
Qty: 90 TABLET | Refills: 3 | Status: SHIPPED | OUTPATIENT
Start: 2022-09-27 | End: 2023-03-28

## 2022-09-27 RX ORDER — ROSUVASTATIN CALCIUM 10 MG/1
10 TABLET, COATED ORAL DAILY
Qty: 90 TABLET | Refills: 3 | Status: SHIPPED | OUTPATIENT
Start: 2022-09-27 | End: 2023-03-28

## 2022-09-27 ASSESSMENT — PATIENT HEALTH QUESTIONNAIRE - PHQ9
SUM OF ALL RESPONSES TO PHQ QUESTIONS 1-9: 3
10. IF YOU CHECKED OFF ANY PROBLEMS, HOW DIFFICULT HAVE THESE PROBLEMS MADE IT FOR YOU TO DO YOUR WORK, TAKE CARE OF THINGS AT HOME, OR GET ALONG WITH OTHER PEOPLE: NOT DIFFICULT AT ALL

## 2022-09-27 ASSESSMENT — PAIN SCALES - GENERAL: PAINLEVEL: MODERATE PAIN (4)

## 2022-09-27 ASSESSMENT — ANXIETY QUESTIONNAIRES: GAD7 TOTAL SCORE: 3

## 2022-09-27 NOTE — PROGRESS NOTES
"  Assessment & Plan     1.  Rotator cuff tear arthropathy of both shoulders with chronic pain but also has generalized pain.  Has been taking gabapentin as well as tramadol.  Refills provided.  2.  Bilateral carpal tunnel syndrome-recurrent.  Right worse than left.  Wear splint all the time.  Symptom in the right hand getting worse towards refer patient for EMG nerve conduction study.  3.  Status post orthotopic heart transplant for cardiomyopathy on 5/6/2021.  4.  Low-grade EB virus viremia.  5.  Stage IIIb chronic kidney disease stable.  6.  Gouty arthropathy chronic without tophi.  Continue with allopurinol 300 mg daily.  Refills provided.   7.  COPD stable on trilogy and albuterol which were refilled today.  8.  Mild anemia stable.  9.  Diabetic neuropathy for which she takes gabapentin.  No diabetes has resolved he continues to have neuropathy symptoms.  10.  Type 2 diabetes mellitus resolved following weight reduction, status post gastric bypass s/p heart transplant.  11.  Major depression disorder under good control with Wellbutrin.  12.  Hyperlipidemia been treated with rosuvastatin.  11.  Influenza vaccine and by by Valent COVID-19 booster provided today.    Reviewed the patient's recent lab.  I will have him return for follow-up in 6 months.  Refills of medication provided for a year.         BMI:   Estimated body mass index is 30.49 kg/m  as calculated from the following:    Height as of 5/24/22: 1.755 m (5' 9.09\").    Weight as of this encounter: 93.9 kg (207 lb).       Return in about 6 months (around 3/27/2023) for Routine Visit.    Ricardo Gómez MD  [facility]    Baylee Fernandez is a 67 year old, presenting for the following health issues:  Imm/Inj (COVID-19 VACCINE)      Imm/Inj    History of Present Illness     Asthma:  He presents for follow up of asthma.  He has no cough, no wheezing, and some shortness of breath. He is using a relief medication a few times a week. He " does not miss any doses of his controller medication throughout the week.Patient is aware of the following triggers: same as previous visit. The patient has not had a visit to the Emergency Room, Urgent Care or Hospital due to asthma since the last clinic visit.     Back Pain:  He presents for follow up of back pain. Patient's back pain is a chronic problem.  Location of back pain:  Left upper back, right shoulder, left shoulder and left side of waist  Description of back pain: dull ache, sharp, shooting, stabbing and other  Back pain spreads: nowhere    Since patient first noticed back pain, pain is: always present, but gets better and worse  Does back pain interfere with his job:  Not applicable      CKD: He uses over the counter pain medication, including tyenol, three times daily.    COPD:  He presents for follow up of COPD.  Overall, COPD symptoms are stable since last visit. He has same as usual fatigue or shortness of breath with exertion and no shortness of breath at rest.He rarely coughs and does not have change in sputum. No recent fever. He can walk less than 1 block without stopping to rest. He can walk 2 flights of stairs without resting.The patient has had no ED, urgent care, or hospital admissions because of COPD since the last visit.     Mental Health Follow-up:  Patient presents to follow-up on Depression & Anxiety.Patient's depression since last visit has been:  Good  The patient is not having other symptoms associated with depression.  Patient's anxiety since last visit has been:  Good  The patient is having other symptoms associated with anxiety.  Any significant life events: financial concerns  Patient is feeling anxious or having panic attacks.  Patient has no concerns about alcohol or drug use.    Diabetes:   He presents for follow up of diabetes.  He is not checking blood glucose. He has no concerns regarding his diabetes at this time.  He is having numbness in feet. The patient has not had a  diabetic eye exam in the last 12 months.         Heart Failure:  He presents for follow up of heart failure. He is experiencing shortness of breath with activity only, which is same as usual. He is experiencing lower extremity edema which is same as usual. He denies orthopenea and is not coughing at night. Patient is checking weight daily. He has recently had a None. He has no side effects from medications.  He has had no other medical visits for heart failure since the last visit.    Hypertension: He presents for follow up of hypertension.  He does check blood pressure  regularly outside of the clinic. Outpatient blood pressures have not been over 140/90. He follows a low salt diet.     Vascular Disease:  He presents for follow up of vascular disease.  He never takes nitroglycerin. He takes daily aspirin.     Today's PHQ-9         PHQ-9 Total Score: 3    PHQ-9 Q9 Thoughts of better off dead/self-harm past 2 weeks :   Not at all    How difficult have these problems made it for you to do your work, take care of things at home, or get along with other people: Not difficult at all  Today's BRIAN-7 Score: 3       Patient comes in for follow-up of multiple health problems as stated in the problem list.  His biggest complaint is joint pain and muscle aching.  Mostly shoulders hurt.  He has known rotator cuff syndrome.  He also complains of tingling numbness of both hands and fingers but particularly right.  He has had previous carpal tunnels surgeries.  He has been wearing a splint yet the symptom of the right hand seems to be getting worse.  He is otherwise done well.  He went to  [place] with his granddaughter and had a good trip.  He walked quite a bit.  He denies chest pain or shortness of breath.      Review of Systems   Constitutional, HEENT, cardiovascular, pulmonary, GI, , musculoskeletal, neuro, skin, endocrine and psych systems are negative, except as otherwise noted.      Objective    /62   Pulse  100   Temp 97.5  F (36.4  C) (Tympanic)   Resp 18   Wt 93.9 kg (207 lb)   SpO2 96%   BMI 30.49 kg/m    Body mass index is 30.49 kg/m .  Physical Exam   GENERAL: healthy, alert and no distress  NECK: no adenopathy, no asymmetry, masses, or scars and thyroid normal to palpation  RESP: lungs clear to auscultation - no rales, rhonchi or wheezes  CV: regular rate and rhythm, normal S1 S2, no S3 or S4, no murmur, click or rub, no peripheral edema and peripheral pulses strong  ABDOMEN: soft, nontender, no hepatosplenomegaly, no masses and bowel sounds normal  MS: no gross musculoskeletal defects noted, no edema     Latest Reference Range & Units 09/15/22 11:02   Sodium 136 - 145 mmol/L  136 - 145 mmol/L 137  137   Potassium 3.4 - 5.3 mmol/L  3.4 - 5.3 mmol/L 4.6  4.7   Chloride 98 - 107 mmol/L  98 - 107 mmol/L 104  104   Carbon Dioxide (CO2) 22 - 29 mmol/L  22 - 29 mmol/L 20 (L)  21 (L)   Urea Nitrogen 8.0 - 23.0 mg/dL  8.0 - 23.0 mg/dL 36.5 (H)  36.6 (H)   Creatinine 0.67 - 1.17 mg/dL  0.67 - 1.17 mg/dL 1.72 (H)  1.73 (H)   GFR Estimate >60 mL/min/1.73m2  >60 mL/min/1.73m2 44 (L)  43 (L)   Calcium 8.8 - 10.2 mg/dL  8.8 - 10.2 mg/dL 8.9  9.0   Anion Gap 7 - 15 mmol/L  7 - 15 mmol/L 13  12   Magnesium 1.7 - 2.3 mg/dL 2.0   Phosphorus 2.5 - 4.5 mg/dL 4.4   Albumin 3.5 - 5.2 g/dL 4.4   Protein Total 6.4 - 8.3 g/dL 6.9   Alkaline Phosphatase 40 - 129 U/L 165 (H)   ALT 10 - 50 U/L 22   AST 10 - 50 U/L 26   Bilirubin Total <=1.2 mg/dL 0.4   Cholesterol <200 mg/dL 136   CK Total 39 - 308 U/L 190   Glucose 70 - 99 mg/dL  70 - 99 mg/dL 107 (H)  108 (H)   HDL Cholesterol >=40 mg/dL 71   LDL Cholesterol Calculated <=100 mg/dL 49   Non HDL Cholesterol <130 mg/dL 65   Triglycerides <150 mg/dL 82   WBC 4.0 - 11.0 10e3/uL 4.8   Hemoglobin 13.3 - 17.7 g/dL 12.4 (L)   Hematocrit 40.0 - 53.0 % 39.2 (L)   Platelet Count 150 - 450 10e3/uL 223   RBC Count 4.40 - 5.90 10e6/uL 4.09 (L)   MCV 78 - 100 fL 96   MCH 26.5 - 33.0 pg 30.3   MCHC  31.5 - 36.5 g/dL 31.6   RDW 10.0 - 15.0 % 12.5   % Neutrophils % 45   % Lymphocytes % 38   % Monocytes % 11   % Eosinophils % 5   % Basophils % 1   Absolute Basophils 0.0 - 0.2 10e3/uL 0.0   Absolute Eosinophils 0.0 - 0.7 10e3/uL 0.2   Absolute Immature Granulocytes <=0.4 10e3/uL 0.0   Absolute Lymphocytes 0.8 - 5.3 10e3/uL 1.8   Absolute Monocytes 0.0 - 1.3 10e3/uL 0.5   % Immature Granulocytes % 0   (L): Data is abnormally low  (H): Data is abnormally high

## 2022-10-03 ENCOUNTER — OFFICE VISIT (OUTPATIENT)
Dept: NEUROLOGY | Facility: CLINIC | Age: 67
End: 2022-10-03
Attending: INTERNAL MEDICINE
Payer: COMMERCIAL

## 2022-10-03 DIAGNOSIS — R29.898 HAND WEAKNESS: Primary | ICD-10-CM

## 2022-10-03 DIAGNOSIS — G56.03 BILATERAL CARPAL TUNNEL SYNDROME: ICD-10-CM

## 2022-10-03 PROCEDURE — 95911 NRV CNDJ TEST 9-10 STUDIES: CPT | Performed by: PSYCHIATRY & NEUROLOGY

## 2022-10-03 PROCEDURE — 95886 MUSC TEST DONE W/N TEST COMP: CPT | Performed by: PSYCHIATRY & NEUROLOGY

## 2022-10-03 NOTE — LETTER
10/3/2022         RE: Jim Willingham  [address] [facility] [address]        Dear Colleague,    Thank you for referring your patient, Jim Willingham, to the [facility] [place]. Please see a copy of my visit note below.                        [facility]  Electrodiagnostic Laboratory                 Department of Neurology                                                                                                         Test Date:  10/3/2022    Patient: Jim Willingham : 1955 Physician: Nba Aguirre MD   Sex: Male AGE: 67 year Ref Phys:    ID#: [id number]   Technician: Aidan Garcia     Clinical Information:  Jim Willingham is a 67 year old man with a past history of carpal tunnel release who is referred because of recurrent sensory symptoms in both hands.    Techniques:  Motor and sensory conduction studies were done with surface recording electrodes. EMG was done with a concentric needle electrode.     Results:  Bilateral median sensory nerve action potentials were absent. Bilateral radial sensory conduction studies demonstrated mild attenuation of amplitude bilaterally and mild slowing of conduction on the left. Ulnar sensory conduction studies demonstrated severe attenuation of amplitude and moderately severe slowing of conduction bilaterally. Median motor conduction studies demonstrated moderately severe attenuation of amplitude bilaterally, worse on the left and moderately severe prolongation of distal latency bilaterally. A left ulnar motor conduction study recording from abductor pollicis brevis demonstrated mild prolongation of distal latency and mild slowing of conduction across the elbow. A right ulnar motor conduction study was normal. A left ulnar motor conduction study recording from first dorsal interosseous demonstrated minimal slowing of conduction and was otherwise normal. Electromyography of the right upper extremity demonstrated mildly  reduced recruitment in the abductor pollicis brevis and was otherwise unremarkable.     Interpretation:  This is an abnormal study, demonstrating electrophysiologic evidence of the followin. Severe bilateral median neuropathy, likely at the level of the wrist. Electrophysiologic testing does not readily distinguish between established prior injury and new, active injury. Clinical correlation is advised.  2. Bilateral ulnar neuropathy at the wrist is not excluded.  3. Moderately severe sensorimotor axonal polyneuropathy.    ___________________________  Nba Aguirre MD        Nerve Conduction Studies  Motor Sites      Latency Amplitude Neg. Amp Diff Segment Distance Velocity Neg. Dur Neg Area Diff Temperature Comment   Site (ms) Norm (mV) Norm %  cm m/s Norm ms %  C    Left Median (APB) Motor   Wrist 8.5  < 4.4 1.90  > 5.0  Wrist-APB 8   8.6  32    Elbow 13.3 - 1.91 - 0.53 Elbow-Wrist 23.5 49  > 48 10.1 24.2 31.9    Arm 3.0 - 3.1 -      6.7  32.2 uln stim to APB, rich.canneaux check   Right Median (APB) Motor   Wrist 8.5  < 4.4 3.7  > 5.0  Wrist-APB 8   7.4  32.3    Elbow 14.0 - 3.3 - -10.8 Elbow-Wrist 25 45  > 48 7.7 -6.0 32    Left Median/Ulnar (Lumb-Dors Int II) Motor        Median (Lumb I)   Wrist 6.8 - 0.27 -      8.1  32         Ulnar (Dorsal Int (manus))   Wrist 3.1 - 0.80 -      7.0  32.1    Right Median/Ulnar (Lumb-Dors Int II) Motor        Median (Lumb I)   Wrist 8.6 - 0.30 -      8.4  33         Ulnar (Dorsal Int (manus))   Wrist 3.0 - 1.15 -      6.7  33    Left Ulnar (ADM) Motor   Wrist 3.7  < 3.5 5.5  > 5.0  Wrist-ADM 8   6.0  33.1    Bel Elbow 7.5 - 5.2 - -5.5 Bel Elbow-Wrist 21.5 57  > 48 6.7 -9.3 32.7    Abv Elbow 10.5 - 5.1 - -1.92 Abv Elbow-Bel Elbow 13 43  > 48 6.6 -4.0 32.6    Right Ulnar (ADM) Motor   Wrist 3.5  < 3.5 5.3  > 5.0  Wrist-ADM 8   6.5  33.1    Bel Elbow 8.1 - 5.4 - 1.89 Bel Elbow-Wrist 23 50  > 48 6.7 -4.6 33    Abv Elbow 10.5 - 5.1 - -5.6 Abv Elbow-Bel Elbow 12 50  > 48 6.9 -2.4  33.1    Left Ulnar (FDI) Motor   Wrist 4.2 - 5.5 -      5.6  32.7    Bel Elbow 8.8 - 5.2 - -5.5 Bel Elbow-Wrist 21.5 47  > 48 5.7 -3.8 32.6    Abv Elbow 11.5 - 4.9 - -5.8 Abv Elbow-Bel Elbow 12.8 47  > 48 5.8 -2.6 32.5      Sensory Sites      Onset Lat Peak Lat Amp (O-P) Amp (P-P) Segment Distance Velocity Temperature Comment   Site ms ms  V Norm  V  cm m/s Norm  C    Left Median Sensory   Wrist-Dig II NR NR NR  > 10 NR Wrist-Dig II 14 NR  > 48 32.1    Right Median Sensory   Wrist-Dig II NR NR NR  > 10 NR Wrist-Dig II 14 NR  > 48 33.2    Left Radial Sensory   Forearm-Wrist 2.2 2.9 10  > 15 11 Forearm-Wrist 10 45 - 32.1    Right Radial Sensory   Forearm-Wrist 2.0 2.6 13  > 15 9 Forearm-Wrist 10 50 - 33.2    Left Ulnar Sensory   Wrist-Dig V 3.6 4.0 2  > 8 2 Wrist-Dig V 12.5 35  > 48 31.7    Right Ulnar Sensory   Wrist-Dig V 3.5 4.3 4  > 8 8 Wrist-Dig V 12.5 36  > 48 33.2        Electromyography     Side Muscle Ins Act Fibs/PSW Fasc HF Amp Dur Poly Recrt Int Pat   Right Pronator Teres Nml None Nml 0 Nml Nml 0 Nml Nml   Right Biceps Nml None Nml 0 Nml Nml 0 Nml Nml   Right EDC Nml None Nml 0 Nml Nml 0 Nml Nml   Right FPL Nml None Nml 0 Nml Nml 0 Nml Nml   Right FDI Nml None Nml 0 Nml Nml 0 Nml Nml   Right APB Nml None Nml 0 Nml Nml 0 Neldia Nml   Right Triceps Nml None Nml 0 Nml Nml 0 Nml Nml   Right C7 Parasp Nml None Nml 0              NCS Waveforms:    Motor                         Sensory                                  Again, thank you for allowing me to participate in the care of your patient.        Sincerely,        Nba Aguirre MD

## 2022-10-03 NOTE — PROGRESS NOTES
[facility]  Electrodiagnostic Laboratory                 Department of Neurology                                                                                                         Test Date:  10/3/2022    Patient: Jim Willingham : 1955 Physician: Nba Aguirre MD   Sex: Male AGE: 67 year Ref Phys:    ID#: [id number]   Technician: Aidan Garcia     Clinical Information:  Jim Willingham is a 67 year old man with a past history of carpal tunnel release who is referred because of recurrent sensory symptoms in both hands.    Techniques:  Motor and sensory conduction studies were done with surface recording electrodes. EMG was done with a concentric needle electrode.     Results:  Bilateral median sensory nerve action potentials were absent. Bilateral radial sensory conduction studies demonstrated mild attenuation of amplitude bilaterally and mild slowing of conduction on the left. Ulnar sensory conduction studies demonstrated severe attenuation of amplitude and moderately severe slowing of conduction bilaterally. Median motor conduction studies demonstrated moderately severe attenuation of amplitude bilaterally, worse on the left and moderately severe prolongation of distal latency bilaterally. A left ulnar motor conduction study recording from abductor pollicis brevis demonstrated mild prolongation of distal latency and mild slowing of conduction across the elbow. A right ulnar motor conduction study was normal. A left ulnar motor conduction study recording from first dorsal interosseous demonstrated minimal slowing of conduction and was otherwise normal. Electromyography of the right upper extremity demonstrated mildly reduced recruitment in the abductor pollicis brevis and was otherwise unremarkable.     Interpretation:  This is an abnormal study, demonstrating electrophysiologic evidence of the followin. Severe bilateral median neuropathy, likely at the level of the wrist.  Electrophysiologic testing does not readily distinguish between established prior injury and new, active injury. Clinical correlation is advised.  2. Bilateral ulnar neuropathy at the wrist is not excluded.  3. Moderately severe sensorimotor axonal polyneuropathy.    ___________________________  Nba Aguirre MD        Nerve Conduction Studies  Motor Sites      Latency Amplitude Neg. Amp Diff Segment Distance Velocity Neg. Dur Neg Area Diff Temperature Comment   Site (ms) Norm (mV) Norm %  cm m/s Norm ms %  C    Left Median (APB) Motor   Wrist 8.5  < 4.4 1.90  > 5.0  Wrist-APB 8   8.6  32    Elbow 13.3 - 1.91 - 0.53 Elbow-Wrist 23.5 49  > 48 10.1 24.2 31.9    Arm 3.0 - 3.1 -      6.7  32.2 uln stim to APB, rich.canneaux check   Right Median (APB) Motor   Wrist 8.5  < 4.4 3.7  > 5.0  Wrist-APB 8   7.4  32.3    Elbow 14.0 - 3.3 - -10.8 Elbow-Wrist 25 45  > 48 7.7 -6.0 32    Left Median/Ulnar (Lumb-Dors Int II) Motor        Median (Lumb I)   Wrist 6.8 - 0.27 -      8.1  32         Ulnar (Dorsal Int (manus))   Wrist 3.1 - 0.80 -      7.0  32.1    Right Median/Ulnar (Lumb-Dors Int II) Motor        Median (Lumb I)   Wrist 8.6 - 0.30 -      8.4  33         Ulnar (Dorsal Int (manus))   Wrist 3.0 - 1.15 -      6.7  33    Left Ulnar (ADM) Motor   Wrist 3.7  < 3.5 5.5  > 5.0  Wrist-ADM 8   6.0  33.1    Bel Elbow 7.5 - 5.2 - -5.5 Bel Elbow-Wrist 21.5 57  > 48 6.7 -9.3 32.7    Abv Elbow 10.5 - 5.1 - -1.92 Abv Elbow-Bel Elbow 13 43  > 48 6.6 -4.0 32.6    Right Ulnar (ADM) Motor   Wrist 3.5  < 3.5 5.3  > 5.0  Wrist-ADM 8   6.5  33.1    Bel Elbow 8.1 - 5.4 - 1.89 Bel Elbow-Wrist 23 50  > 48 6.7 -4.6 33    Abv Elbow 10.5 - 5.1 - -5.6 Abv Elbow-Bel Elbow 12 50  > 48 6.9 -2.4 33.1    Left Ulnar (FDI) Motor   Wrist 4.2 - 5.5 -      5.6  32.7    Bel Elbow 8.8 - 5.2 - -5.5 Bel Elbow-Wrist 21.5 47  > 48 5.7 -3.8 32.6    Abv Elbow 11.5 - 4.9 - -5.8 Abv Elbow-Bel Elbow 12.8 47  > 48 5.8 -2.6 32.5      Sensory Sites      Onset Lat Peak Lat Amp  (O-P) Amp (P-P) Segment Distance Velocity Temperature Comment   Site ms ms  V Norm  V  cm m/s Norm  C    Left Median Sensory   Wrist-Dig II NR NR NR  > 10 NR Wrist-Dig II 14 NR  > 48 32.1    Right Median Sensory   Wrist-Dig II NR NR NR  > 10 NR Wrist-Dig II 14 NR  > 48 33.2    Left Radial Sensory   Forearm-Wrist 2.2 2.9 10  > 15 11 Forearm-Wrist 10 45 - 32.1    Right Radial Sensory   Forearm-Wrist 2.0 2.6 13  > 15 9 Forearm-Wrist 10 50 - 33.2    Left Ulnar Sensory   Wrist-Dig V 3.6 4.0 2  > 8 2 Wrist-Dig V 12.5 35  > 48 31.7    Right Ulnar Sensory   Wrist-Dig V 3.5 4.3 4  > 8 8 Wrist-Dig V 12.5 36  > 48 33.2        Electromyography     Side Muscle Ins Act Fibs/PSW Fasc HF Amp Dur Poly Recrt Int Pat   Right Pronator Teres Nml None Nml 0 Nml Nml 0 Nml Nml   Right Biceps Nml None Nml 0 Nml Nml 0 Nml Nml   Right EDC Nml None Nml 0 Nml Nml 0 Nml Nml   Right FPL Nml None Nml 0 Nml Nml 0 Nml Nml   Right FDI Nml None Nml 0 Nml Nml 0 Nml Nml   Right APB Nml None Nml 0 Nml Nml 0 Nelida Nml   Right Triceps Nml None Nml 0 Nml Nml 0 Nml Nml   Right C7 Parasp Nml None Nml 0              NCS Waveforms:    Motor                         Sensory

## 2022-10-06 DIAGNOSIS — G56.03 BILATERAL CARPAL TUNNEL SYNDROME: Primary | ICD-10-CM

## 2022-10-06 NOTE — PROGRESS NOTES
The EMG nerve conduction study show evidence of severe bilateral carpal tunnel syndrome.  He has had previous surgery.  Symptoms are severe so we will refer to orthopedics.

## 2022-10-09 ENCOUNTER — HEALTH MAINTENANCE LETTER (OUTPATIENT)
Age: 67
End: 2022-10-09

## 2022-10-27 DIAGNOSIS — Z94.1 TRANSPLANTED HEART (H): Primary | ICD-10-CM

## 2022-10-28 ENCOUNTER — NURSE TRIAGE (OUTPATIENT)
Dept: NURSING | Facility: CLINIC | Age: 67
End: 2022-10-28

## 2022-10-28 ENCOUNTER — ANCILLARY PROCEDURE (OUTPATIENT)
Dept: GENERAL RADIOLOGY | Facility: CLINIC | Age: 67
End: 2022-10-28
Attending: PHYSICIAN ASSISTANT
Payer: COMMERCIAL

## 2022-10-28 ENCOUNTER — OFFICE VISIT (OUTPATIENT)
Dept: URGENT CARE | Facility: URGENT CARE | Age: 67
End: 2022-10-28
Payer: COMMERCIAL

## 2022-10-28 VITALS
HEART RATE: 95 BPM | WEIGHT: 209.8 LBS | TEMPERATURE: 97.5 F | DIASTOLIC BLOOD PRESSURE: 79 MMHG | OXYGEN SATURATION: 97 % | BODY MASS INDEX: 30.9 KG/M2 | SYSTOLIC BLOOD PRESSURE: 126 MMHG

## 2022-10-28 DIAGNOSIS — R50.9 FEVER, UNSPECIFIED FEVER CAUSE: ICD-10-CM

## 2022-10-28 DIAGNOSIS — J44.1 CHRONIC OBSTRUCTIVE PULMONARY DISEASE WITH ACUTE EXACERBATION (H): ICD-10-CM

## 2022-10-28 DIAGNOSIS — E11.42 TYPE 2 DIABETES MELLITUS WITH DIABETIC POLYNEUROPATHY, WITHOUT LONG-TERM CURRENT USE OF INSULIN (H): ICD-10-CM

## 2022-10-28 DIAGNOSIS — D84.9 IMMUNOCOMPROMISED (H): ICD-10-CM

## 2022-10-28 DIAGNOSIS — J20.9 ACUTE BRONCHITIS WITH COEXISTING CONDITION REQUIRING PROPHYLACTIC TREATMENT: ICD-10-CM

## 2022-10-28 DIAGNOSIS — J10.1 INFLUENZA B: Primary | ICD-10-CM

## 2022-10-28 DIAGNOSIS — Z94.1 HEART REPLACED BY TRANSPLANT (H): ICD-10-CM

## 2022-10-28 LAB
FLUAV AG SPEC QL IA: NEGATIVE
FLUBV AG SPEC QL IA: POSITIVE

## 2022-10-28 PROCEDURE — U0003 INFECTIOUS AGENT DETECTION BY NUCLEIC ACID (DNA OR RNA); SEVERE ACUTE RESPIRATORY SYNDROME CORONAVIRUS 2 (SARS-COV-2) (CORONAVIRUS DISEASE [COVID-19]), AMPLIFIED PROBE TECHNIQUE, MAKING USE OF HIGH THROUGHPUT TECHNOLOGIES AS DESCRIBED BY CMS-2020-01-R: HCPCS | Performed by: PHYSICIAN ASSISTANT

## 2022-10-28 PROCEDURE — 87804 INFLUENZA ASSAY W/OPTIC: CPT | Performed by: PHYSICIAN ASSISTANT

## 2022-10-28 PROCEDURE — U0005 INFEC AGEN DETEC AMPLI PROBE: HCPCS | Performed by: PHYSICIAN ASSISTANT

## 2022-10-28 PROCEDURE — 99214 OFFICE O/P EST MOD 30 MIN: CPT | Mod: CS | Performed by: PHYSICIAN ASSISTANT

## 2022-10-28 PROCEDURE — 71046 X-RAY EXAM CHEST 2 VIEWS: CPT | Mod: TC | Performed by: RADIOLOGY

## 2022-10-28 RX ORDER — DOXYCYCLINE HYCLATE 100 MG
100 TABLET ORAL 2 TIMES DAILY
Qty: 20 TABLET | Refills: 0 | Status: SHIPPED | OUTPATIENT
Start: 2022-10-28 | End: 2022-11-07

## 2022-10-28 RX ORDER — OSELTAMIVIR PHOSPHATE 75 MG/1
75 CAPSULE ORAL 2 TIMES DAILY
Qty: 10 CAPSULE | Refills: 0 | Status: SHIPPED | OUTPATIENT
Start: 2022-10-28 | End: 2022-11-02

## 2022-10-28 ASSESSMENT — ENCOUNTER SYMPTOMS
ADENOPATHY: 0
FATIGUE: 1
HEADACHES: 1
DIARRHEA: 0
SINUS PAIN: 0
MYALGIAS: 0
FEVER: 0
CONSTIPATION: 0
FLANK PAIN: 0
NECK PAIN: 0
SORE THROAT: 1
CHILLS: 0
SINUS PRESSURE: 0
COUGH: 1
NECK STIFFNESS: 0
SHORTNESS OF BREATH: 1
NAUSEA: 0
RHINORRHEA: 1
VOMITING: 0
DYSURIA: 0
ABDOMINAL PAIN: 0

## 2022-10-28 NOTE — TELEPHONE ENCOUNTER
RN called patient and relayed provider message below. Patient verbalized understanding and states he will go to urgent care now.     No further questions from patient.    Camila Méndez RN    [facility]

## 2022-10-28 NOTE — TELEPHONE ENCOUNTER
I do not have any openings today for him to be seen by me.  He will have to go to urgent care.  Ask him to go to [place] urgent care clinic.  He will probably need a chest x-ray.

## 2022-10-28 NOTE — TELEPHONE ENCOUNTER
"Nurse Triage SBAR    Is this a 2nd Level Triage? YES, LICENSED PRACTITIONER REVIEW IS REQUIRED    Situation: 17 mos post heart transplant. COPD, Asthma. URI x 4-5 days now. Wet cough, runny nose, mild shortness of breath and wheezing off and on. Fever of 99.9 x 4 days when he normally runs around 97.0.    No appointments available at all today or next week. Pt has had bad experiences with urgent care, as the heart transplant, \"freaks them out\", per patient. He would like to avoid that if possible and would like to see if he can be squeezed in and be seen today in office, if COVID neg.       Background: Patient, Jim, calling. Consent: not needed.    Assessment:  Temp of 99.9 using infrared forehead thermometer.  Wet cough, producing light green sputum. HA. Already has body aches, but no increase in body aches. Fever x 4 days now, but low grade. Occasional wheezing and mild shortness of breath, when up and moving around only,  but it is mild, and only off and on. Has his albuterol inhaler, and that does help with it, but he is using is more often.   Denies chest pain, chest tightness/heaviness.   H/O COPD, asthma.   He has NOT done a COVID test yet, but does have one at home, which he stated that he would check before we call him back.    Protocol Recommended Disposition:  Discuss with PCP and call back to patient within 1 hour.    Recommendation: According to the protocol, Patient should wait for a call back after we route a message to his provider to discuss next steps. Advised Patient to wait for a call back after we speak to a provider, and then will call him back. Do the home COVID test so we can be sure, as that would change his treatment plan. Care advice given. Patient verbalizes understanding and agrees with plan of care. Reviewed concerning symptoms and when to call back and patient verbalized understanding and agreed to follow care advice given.       Cherie Pitts RN  [facility] Nurse " Advisor  10/28/2022 at 8:28 AM         Does the patient meet one of the following criteria for ADS visit consideration? 16+ years old, with an [facility] PCP   Yes.     TIP  Providers, please consider if this condition is appropriate for management at one of our Acute and Diagnostic Services sites.      If patient is a good candidate, please use dotphrase <dot>triageresponse and select Refer to ADS to document.      Reason for Disposition    MILD difficulty breathing (e.g., minimal/no SOB at rest, SOB with walking, pulse <100)    Additional Information    Negative: SEVERE difficulty breathing (e.g., struggling for each breath, speaks in single words)    Negative: Difficult to awaken or acting confused (e.g., disoriented, slurred speech)    Negative: Bluish (or gray) lips or face now    Negative: Shock suspected (e.g., cold/pale/clammy skin, too weak to stand, low BP, rapid pulse)    Negative: Sounds like a life-threatening emergency to the triager    Negative: [1] Diagnosed or suspected COVID-19 AND [2] symptoms lasting 3 or more weeks    Negative: [1] COVID-19 exposure AND [2] no symptoms    Negative: COVID-19 vaccine reaction suspected (e.g., fever, headache, muscle aches) occurring 1 to 3 days after getting vaccine    Negative: COVID-19 vaccine, questions about    Negative: [1] Lives with someone known to have influenza (flu test positive) AND [2] flu-like symptoms (e.g., cough, runny nose, sore throat, SOB; with or without fever)    Negative: [1] Adult with possible COVID-19 symptoms AND [2] triager concerned about severity of symptoms or other causes    Negative: COVID-19 and breastfeeding, questions about    Negative: SEVERE or constant chest pain or pressure  (Exception: Mild central chest pain, present only when coughing.)    Negative: MODERATE difficulty breathing (e.g., speaks in phrases, SOB even at rest, pulse 100-120)    Negative: Headache and stiff neck (can't touch chin to chest)    Negative: Oxygen level  (e.g., pulse oximetry) 90 percent or lower    Negative: Chest pain or pressure  (Exception: MILD central chest pain, present only when coughing)    Negative: Patient sounds very sick or weak to the triager    Protocols used: CORONAVIRUS (COVID-19) DIAGNOSED OR [id number]

## 2022-10-28 NOTE — PROGRESS NOTES
Chief Complaint:     Chief Complaint   Patient presents with     URI     Runny nose, cough, green sputum, wheezing (using inhaler more often). Onset- Sunday      Shortness of Breath       Results for orders placed or performed in visit on 10/28/22   XR Chest 2 Views     Status: None    Narrative    EXAM: XR CHEST 2 VIEWS  LOCATION: [facility]  DATE/TIME: 10/28/2022 4:43 PM    INDICATION:  Fever, unspecified fever cause  COMPARISON: 5/17/2022      Impression    IMPRESSION: No change. Previous CABG. Old fractured pacemaker lead unchanged in position. Heart size normal. Stable blunting at left costophrenic angle, lungs otherwise clear.   Results for orders placed or performed in visit on 10/28/22   Influenza A & B Antigen - Clinic Collect     Status: Abnormal    Specimen: Nose; Swab   Result Value Ref Range    Influenza A antigen Negative Negative    Influenza B antigen Positive (A) Negative    Narrative    Test results must be correlated with clinical data. If necessary, results should be confirmed by a molecular assay or viral culture.       Medical Decision Making:    Vital signs reviewed by Woody Ernandez PA-C  /79 (BP Location: Left arm, Patient Position: Sitting, Cuff Size: Adult Regular)   Pulse 95   Temp 97.5  F (36.4  C) (Tympanic)   Wt 95.2 kg (209 lb 12.8 oz)   SpO2 97%   BMI 30.90 kg/m      Differential Diagnosis:  Bronchitis-viral, Influenza, Pneumonia, Viral syndrome and Viral upper respiratory illness        ASSESSMENT    1. Influenza B    2. Acute bronchitis with coexisting condition requiring prophylactic treatment    3. Type 2 diabetes mellitus with diabetic polyneuropathy, without long-term current use of insulin (H)    4. Chronic obstructive pulmonary disease with acute exacerbation (H)    5. Heart replaced by transplant (H)    6. Immunocompromised (H)    7. Fever, unspecified fever cause        PLAN    Patient is in no acute distress.    Temp is 97.5 in  clinic today, lung sounds were clear, and O2 sats at 97% on RA.    CXR was negative for any acute infiltrates or consolidations per my read.    Influenza was positive for B.  Rx for Tamiflu today.  COVID swab collected in clinic.    With symptoms and Hx, Rx for Doxycycline sent in.  Rest, Push fluids, vaporizer, elevation of head of bed.  Ibuprofen and or Tylenol for any fever or body aches.  Over the counter cough suppressant- PRN- as discussed.   Patient instructed to monitor blood sugars closely with illness.  Continue taking your medications as prescribed and follow up with PCP for any DM medication changes if needed.  If symptoms worsen, recheck immediately otherwise follow up with your PCP in 1 week if symptoms are not improving.  Worrisome symptoms discussed with instructions to go to the ED.  Patient verbalized understanding and agreed with this plan.    Labs:    Results for orders placed or performed in visit on 10/28/22   XR Chest 2 Views     Status: None    Narrative    EXAM: XR CHEST 2 VIEWS  LOCATION: [facility]  DATE/TIME: 10/28/2022 4:43 PM    INDICATION:  Fever, unspecified fever cause  COMPARISON: 5/17/2022      Impression    IMPRESSION: No change. Previous CABG. Old fractured pacemaker lead unchanged in position. Heart size normal. Stable blunting at left costophrenic angle, lungs otherwise clear.   Results for orders placed or performed in visit on 10/28/22   Influenza A & B Antigen - Clinic Collect     Status: Abnormal    Specimen: Nose; Swab   Result Value Ref Range    Influenza A antigen Negative Negative    Influenza B antigen Positive (A) Negative    Narrative    Test results must be correlated with clinical data. If necessary, results should be confirmed by a molecular assay or viral culture.        Vital signs reviewed by Woody Ernandez PA-C  /79 (BP Location: Left arm, Patient Position: Sitting, Cuff Size: Adult Regular)   Pulse 95   Temp 97.5  F (36.4   C) (Tympanic)   Wt 95.2 kg (209 lb 12.8 oz)   SpO2 97%   BMI 30.90 kg/m      Current Meds      Current Outpatient Medications:      acetaminophen (TYLENOL) 325 MG tablet, Take 3 tablets (975 mg) by mouth every 6 hours as needed for other (For optimal non-opioid multimodal pain management to improve pain control.), Disp: 100 tablet, Rfl: 1     albuterol (VENTOLIN HFA) 108 (90 Base) MCG/ACT inhaler, INHALE 2 PUFFS BY MOUTH EVERY 4 HOURS AS NEEDED. MAX OF 12 PUFFS PER 24 HOURS, Disp: 18 g, Rfl: 11     allopurinol (ZYLOPRIM) 300 MG tablet, Take 1 tablet (300 mg) by mouth daily, Disp: 90 tablet, Rfl: 3     ALPRAZolam (XANAX) 1 MG tablet, Taken one tablet half an hour before flying. May repeat dose after 6 hours., Disp: 10 tablet, Rfl: 0     aspirin (ASA) 81 MG chewable tablet, Take 1 tablet (81 mg) by mouth daily, Disp: 100 tablet, Rfl: 1     buPROPion (WELLBUTRIN) 75 MG tablet, TAKE 1 TABLET(75 MG) BY MOUTH TWICE DAILY, Disp: 180 tablet, Rfl: 3     calcium citrate-vitamin D (CITRACAL) 315-250 MG-UNIT TABS per tablet, Take 1 tablet by mouth 2 times daily, Disp: 180 tablet, Rfl: 3     docusate sodium (COLACE) 100 MG capsule, Take 1 capsule (100 mg) by mouth 2 times daily, Disp: 60 capsule, Rfl: 3     doxycycline hyclate (VIBRA-TABS) 100 MG tablet, Take 1 tablet (100 mg) by mouth 2 times daily for 10 days, Disp: 20 tablet, Rfl: 0     Fluticasone-Umeclidin-Vilanterol (TRELEGY ELLIPTA) 100-62.5-25 MCG/INH oral inhaler, Inhale 1 puff into the lungs daily, Disp: 1 each, Rfl: 11     gabapentin (NEURONTIN) 300 MG capsule, Take 1 capsule (300 mg) by mouth 2 times daily, Disp: 180 capsule, Rfl: 3     Melatonin 10 MG CAPS, Take 1 capsule by mouth At Bedtime, Disp: , Rfl:      montelukast (SINGULAIR) 10 MG tablet, TAKE 1 TABLET(10 MG) BY MOUTH AT BEDTIME, Disp: 90 tablet, Rfl: 3     mycophenolate (GENERIC EQUIVALENT) 250 MG capsule, TAKE 2 CAPSULES(500 MG) BY MOUTH TWICE DAILY, Disp: 360 capsule, Rfl: 3     oseltamivir (TAMIFLU)  75 MG capsule, Take 1 capsule (75 mg) by mouth 2 times daily for 5 days, Disp: 10 capsule, Rfl: 0     rosuvastatin (CRESTOR) 10 MG tablet, Take 1 tablet (10 mg) by mouth daily, Disp: 90 tablet, Rfl: 3     tacrolimus (GENERIC EQUIVALENT) 0.5 MG capsule, Take ONE cap with FOUR 1 mg caps (4.5 mg) every AM, Disp: 30 capsule, Rfl: 11     tacrolimus (GENERIC EQUIVALENT) 1 MG capsule, Take FOUR caps with ONE 0.5 mg cap (4.5 mg) every AM and FOUR caps (4 mg) every PM, Disp: 240 capsule, Rfl: 11     traMADol (ULTRAM) 50 MG tablet, One tablet in am, one table in afternoon and two tablet at bedtime, Disp: 120 tablet, Rfl: 3     anakinra (KINERET) 100 MG/0.67ML SOSY injection, Inject 0.67 mLs (100 mg) Subcutaneous daily As directed by rheumatology, Disp: 20.1 mL, Rfl: 6      Respiratory History    frequent episodes of bronchitis, COPD and heart Disease      SUBJECTIVE    HPI: Jim Willingham is an 67 year old male who presents with fatigue, fever, SOB, and productive cough which started 5 days ago and has worsened, with greenish sputum. Patient states that he has a history of recurrent bronchitis, pneumonia, and he is currently immunosuppressed. Patient has a complicated medical Hx including COPD, heart transplant, and DM2. He denies fever, diarrhea, chills, N/V, or respiratory distress.     Patient denies any recent travel or exposure to known COVID positive tested individual.      ROS:     Review of Systems   Constitutional: Positive for fatigue. Negative for chills and fever.   HENT: Positive for rhinorrhea and sore throat. Negative for ear pain, sinus pressure and sinus pain.    Eyes: Negative for visual disturbance.   Respiratory: Positive for cough and shortness of breath.    Cardiovascular: Negative for chest pain.   Gastrointestinal: Negative for abdominal pain, constipation, diarrhea, nausea and vomiting.   Endocrine: Negative for cold intolerance and heat intolerance.   Genitourinary: Negative for dysuria and flank  pain.   Musculoskeletal: Negative for myalgias, neck pain and neck stiffness.   Allergic/Immunologic: Positive for immunocompromised state.   Neurological: Positive for headaches.   Hematological: Negative for adenopathy.         Family History   Family History   Problem Relation Age of Onset     Cerebrovascular Disease Mother 64     Diabetes Mother      Hypertension Mother      Coronary Artery Disease Father      Diabetes Type 2  Father      Obesity Brother      Obesity Brother      Cerebrovascular Disease Daughter 40        Problem history  Patient Active Problem List   Diagnosis     Stage 3b chronic kidney disease (H)     H/O gastric bypass     Vitamin B12 deficiency (non anemic)     Chronic systolic congestive heart failure (H)     Iron deficiency anemia     Bilateral carpal tunnel syndrome     Rotator cuff tear arthropathy of both shoulders     COPD (chronic obstructive pulmonary disease) (H)     Major depression, recurrent, chronic (H)     Gouty arthropathy, chronic, without tophi     Need for prophylactic antibiotic     S/P orthotopic heart transplant (H)     Abnormal CT scan of lung     Heart replaced by transplant (H)     Generalized muscle weakness     Pulmonary cavitary lesion     Encounter for monitoring tacrolimus therapy     Hx of aspergillosis     Leigh-Barr virus viremia     Type 2 diabetes mellitus with diabetic polyneuropathy (H)     Mild anemia     Status post coronary angiogram     Hyperlipidemia LDL goal <70        Allergies  Allergies   Allergen Reactions     Grass Shortness Of Breath     Ace Inhibitors Cough     Cats      Dust Mites Other (See Comments)     Asthma     Mold Other (See Comments)     Asthma     Penicillins Other (See Comments)     Unknown - childhood exposure    Tolerated Zosyn 9/18-9/20/2020    Per patient report he has tolerated amoxicillin     Sulfa Drugs Other (See Comments) and Unknown     Unknown childhood reaction        Social History  Social History     Socioeconomic  History     Marital status:      Spouse name: Not on file     Number of children: Not on file     Years of education: Not on file     Highest education level: Not on file   Occupational History     Not on file   Tobacco Use     Smoking status: Former     Types: Cigarettes     Quit date:      Years since quittin.8     Smokeless tobacco: Never   Substance and Sexual Activity     Alcohol use: No     Drug use: No     Sexual activity: Yes     Partners: Female   Other Topics Concern     Parent/sibling w/ CABG, MI or angioplasty before 65F 55M? No   Social History Narrative     Not on file     Social Determinants of Health     Financial Resource Strain: Not on file   Food Insecurity: Not on file   Transportation Needs: Not on file   Physical Activity: Not on file   Stress: Not on file   Social Connections: Not on file   Intimate Partner Violence: Not on file   Housing Stability: Not on file        OBJECTIVE     Vital signs reviewed by Woody Ernandez PA-C  /79 (BP Location: Left arm, Patient Position: Sitting, Cuff Size: Adult Regular)   Pulse 95   Temp 97.5  F (36.4  C) (Tympanic)   Wt 95.2 kg (209 lb 12.8 oz)   SpO2 97%   BMI 30.90 kg/m       Physical Exam  Constitutional:       General: He is not in acute distress.     Appearance: Normal appearance. He is not ill-appearing, toxic-appearing or diaphoretic.   HENT:      Head: Normocephalic and atraumatic.      Right Ear: Tympanic membrane, ear canal and external ear normal.      Left Ear: Tympanic membrane, ear canal and external ear normal.      Nose: Rhinorrhea present.      Mouth/Throat:      Mouth: Mucous membranes are moist.      Pharynx: Oropharynx is clear.   Eyes:      General: No scleral icterus.        Right eye: No discharge.         Left eye: No discharge.      Extraocular Movements: Extraocular movements intact.      Conjunctiva/sclera: Conjunctivae normal.   Cardiovascular:      Rate and Rhythm: Normal rate and regular rhythm.    Pulmonary:      Effort: Pulmonary effort is normal. No respiratory distress.      Breath sounds: Normal breath sounds.   Abdominal:      Tenderness: There is no abdominal tenderness. There is no guarding.   Musculoskeletal:         General: Normal range of motion.      Cervical back: Normal range of motion. No rigidity.   Skin:     General: Skin is warm and dry.   Neurological:      General: No focal deficit present.      Mental Status: He is alert and oriented to person, place, and time.   Psychiatric:         Mood and Affect: Mood normal.         Behavior: Behavior normal.         Thought Content: Thought content normal.         Judgment: Judgment normal.           Woody Ernandez PA-C  10/28/2022, 4:06 PM

## 2022-10-29 LAB — SARS-COV-2 RNA RESP QL NAA+PROBE: NEGATIVE

## 2022-12-02 ENCOUNTER — LAB (OUTPATIENT)
Dept: LAB | Facility: CLINIC | Age: 67
End: 2022-12-02
Payer: COMMERCIAL

## 2022-12-02 DIAGNOSIS — Z94.1 TRANSPLANTED HEART (H): ICD-10-CM

## 2022-12-02 LAB
ERYTHROCYTE [DISTWIDTH] IN BLOOD BY AUTOMATED COUNT: 13.9 % (ref 10–15)
HCT VFR BLD AUTO: 35.1 % (ref 40–53)
HGB BLD-MCNC: 10.8 G/DL (ref 13.3–17.7)
MCH RBC QN AUTO: 30.3 PG (ref 26.5–33)
MCHC RBC AUTO-ENTMCNC: 30.8 G/DL (ref 31.5–36.5)
MCV RBC AUTO: 98 FL (ref 78–100)
PLATELET # BLD AUTO: 212 10E3/UL (ref 150–450)
RBC # BLD AUTO: 3.57 10E6/UL (ref 4.4–5.9)
TACROLIMUS BLD-MCNC: 6.1 UG/L (ref 5–15)
TME LAST DOSE: NORMAL H
TME LAST DOSE: NORMAL H
WBC # BLD AUTO: 4.7 10E3/UL (ref 4–11)

## 2022-12-02 PROCEDURE — 80197 ASSAY OF TACROLIMUS: CPT

## 2022-12-02 PROCEDURE — 36415 COLL VENOUS BLD VENIPUNCTURE: CPT

## 2022-12-02 PROCEDURE — 85027 COMPLETE CBC AUTOMATED: CPT

## 2022-12-12 NOTE — TELEPHONE ENCOUNTER
Pt called to review the following:     Tac level 10.6. goal 6-8 (recently decreased by Dr. Montgomery on 9/21). Cr 1.8. Current dose 2/1. Instructions given to decrease to 1 mg bid. Repeat at appt next week.     Home health will come next Thursday and do labs if we need them.     We will repeat EBV at next set of appts as well. Watch for symptoms.     Remind him he was suppose to have appt with pharmacy yesterday and he missed it. Set up another appt to discuss stopping amitrip.     Potassium 5.9. avoid potassium rich foods. Take meds as directed.     PT saw patient today. He had a fall. He was getting out of recliner and tripped. He did not hit head. PT will continue to work with patient.     10/5 appt reviewed with patient.        FAMILY HISTORY:  Family history of cardiac disorder, Paternal    Sibling  Still living? Unknown  Family history of COPD (chronic obstructive pulmonary disease), Age at diagnosis: Age Unknown

## 2022-12-29 ENCOUNTER — PRE VISIT (OUTPATIENT)
Dept: TRANSPLANT | Facility: CLINIC | Age: 67
End: 2022-12-29

## 2022-12-29 DIAGNOSIS — Z94.1 HEART REPLACED BY TRANSPLANT (H): Primary | ICD-10-CM

## 2022-12-30 ENCOUNTER — TELEPHONE (OUTPATIENT)
Dept: TRANSPLANT | Facility: CLINIC | Age: 67
End: 2022-12-30

## 2022-12-30 NOTE — TELEPHONE ENCOUNTER
Called patient to discuss lab draw for upcoming appt next week. Patient will have tacrolimus level drawn at local clinic earlier in the week (for timing purposes) and will have the rest of his labs drawn as scheduled on clinic day 1/5 at  (includes allomap). Patient verbalized understanding and next steps.

## 2023-01-01 ENCOUNTER — TELEPHONE (OUTPATIENT)
Dept: CARDIOLOGY | Facility: CLINIC | Age: 68
End: 2023-01-01

## 2023-01-01 ENCOUNTER — TELEPHONE (OUTPATIENT)
Dept: FAMILY MEDICINE | Facility: CLINIC | Age: 68
End: 2023-01-01

## 2023-01-01 ENCOUNTER — OFFICE VISIT (OUTPATIENT)
Dept: URGENT CARE | Facility: URGENT CARE | Age: 68
End: 2023-01-01
Payer: COMMERCIAL

## 2023-01-01 VITALS
BODY MASS INDEX: 26.65 KG/M2 | SYSTOLIC BLOOD PRESSURE: 123 MMHG | OXYGEN SATURATION: 97 % | RESPIRATION RATE: 18 BRPM | DIASTOLIC BLOOD PRESSURE: 79 MMHG | HEART RATE: 96 BPM | TEMPERATURE: 97.4 F | WEIGHT: 182 LBS

## 2023-01-01 DIAGNOSIS — Z13.220 LIPID SCREENING: ICD-10-CM

## 2023-01-01 DIAGNOSIS — J45.20 MILD INTERMITTENT ASTHMATIC BRONCHITIS WITHOUT COMPLICATION: Primary | ICD-10-CM

## 2023-01-01 DIAGNOSIS — Z12.5 PROSTATE CANCER SCREENING: ICD-10-CM

## 2023-01-01 DIAGNOSIS — Z13.29 THYROID DISORDER SCREENING: ICD-10-CM

## 2023-01-01 DIAGNOSIS — E78.5 HYPERLIPIDEMIA LDL GOAL <70: ICD-10-CM

## 2023-01-01 DIAGNOSIS — Z94.1 S/P ORTHOTOPIC HEART TRANSPLANT (H): ICD-10-CM

## 2023-01-01 DIAGNOSIS — Z94.1 TRANSPLANTED HEART (H): Primary | ICD-10-CM

## 2023-01-01 DIAGNOSIS — M1A.3790 CHRONIC GOUT DUE TO RENAL IMPAIRMENT INVOLVING FOOT WITHOUT TOPHUS, UNSPECIFIED LATERALITY: ICD-10-CM

## 2023-01-01 DIAGNOSIS — E11.9 DIABETES MELLITUS (H): ICD-10-CM

## 2023-01-01 DIAGNOSIS — D84.9 IMMUNOCOMPROMISED PATIENT (H): ICD-10-CM

## 2023-01-01 LAB — SARS-COV-2 RNA RESP QL NAA+PROBE: NEGATIVE

## 2023-01-01 PROCEDURE — 87635 SARS-COV-2 COVID-19 AMP PRB: CPT | Performed by: PHYSICIAN ASSISTANT

## 2023-01-01 PROCEDURE — 99213 OFFICE O/P EST LOW 20 MIN: CPT | Performed by: PHYSICIAN ASSISTANT

## 2023-01-01 RX ORDER — TACROLIMUS 1 MG/1
CAPSULE ORAL
Qty: 600 CAPSULE | Refills: 2 | Status: SHIPPED | OUTPATIENT
Start: 2023-01-01 | End: 2023-01-01

## 2023-01-01 RX ORDER — ALLOPURINOL 300 MG/1
1 TABLET ORAL DAILY
Qty: 100 TABLET | Refills: 3 | Status: SHIPPED | OUTPATIENT
Start: 2023-01-01 | End: 2024-01-01

## 2023-01-01 RX ORDER — ROSUVASTATIN CALCIUM 10 MG/1
10 TABLET, COATED ORAL DAILY
Qty: 90 TABLET | Refills: 3 | Status: ON HOLD | OUTPATIENT
Start: 2023-01-01 | End: 2024-01-01

## 2023-01-01 RX ORDER — PREDNISONE 20 MG/1
40 TABLET ORAL DAILY
Qty: 14 TABLET | Refills: 0 | Status: SHIPPED | OUTPATIENT
Start: 2023-01-01 | End: 2023-01-01

## 2023-01-01 RX ORDER — GABAPENTIN 300 MG/1
300 CAPSULE ORAL 2 TIMES DAILY
Qty: 180 CAPSULE | Refills: 3 | Status: SHIPPED | OUTPATIENT
Start: 2023-01-01 | End: 2024-01-01

## 2023-01-01 RX ORDER — BENZONATATE 200 MG/1
200 CAPSULE ORAL 3 TIMES DAILY PRN
Qty: 30 CAPSULE | Refills: 0 | Status: SHIPPED | OUTPATIENT
Start: 2023-01-01 | End: 2023-01-01

## 2023-01-01 RX ORDER — AZITHROMYCIN 250 MG/1
TABLET, FILM COATED ORAL
Qty: 6 TABLET | Refills: 0 | Status: ON HOLD | OUTPATIENT
Start: 2023-01-01 | End: 2024-01-01

## 2023-01-01 RX ORDER — TACROLIMUS 1 MG/1
CAPSULE ORAL
Qty: 180 CAPSULE | Refills: 3 | Status: SHIPPED | OUTPATIENT
Start: 2023-01-01 | End: 2024-01-01

## 2023-01-01 RX ORDER — TACROLIMUS 0.5 MG/1
CAPSULE ORAL
Qty: 50 CAPSULE | Refills: 6 | Status: SHIPPED | OUTPATIENT
Start: 2023-01-01 | End: 2024-01-01

## 2023-01-01 ASSESSMENT — ENCOUNTER SYMPTOMS
COUGH: 1
CHEST TIGHTNESS: 0
SORE THROAT: 0
PALPITATIONS: 0
SINUS PRESSURE: 0
CHILLS: 1
CARDIOVASCULAR NEGATIVE: 1
NAUSEA: 1
WHEEZING: 0
FATIGUE: 0
RHINORRHEA: 0
SHORTNESS OF BREATH: 1
SINUS PAIN: 0
FEVER: 0

## 2023-01-03 ENCOUNTER — TELEPHONE (OUTPATIENT)
Dept: TRANSPLANT | Facility: CLINIC | Age: 68
End: 2023-01-03

## 2023-01-06 ENCOUNTER — OFFICE VISIT (OUTPATIENT)
Dept: URGENT CARE | Facility: URGENT CARE | Age: 68
End: 2023-01-06
Payer: COMMERCIAL

## 2023-01-06 ENCOUNTER — ANCILLARY PROCEDURE (OUTPATIENT)
Dept: GENERAL RADIOLOGY | Facility: CLINIC | Age: 68
End: 2023-01-06
Attending: PHYSICIAN ASSISTANT
Payer: COMMERCIAL

## 2023-01-06 VITALS
DIASTOLIC BLOOD PRESSURE: 85 MMHG | BODY MASS INDEX: 29.66 KG/M2 | OXYGEN SATURATION: 99 % | WEIGHT: 201.4 LBS | TEMPERATURE: 97.5 F | HEART RATE: 104 BPM | SYSTOLIC BLOOD PRESSURE: 142 MMHG

## 2023-01-06 DIAGNOSIS — J44.1 CHRONIC OBSTRUCTIVE PULMONARY DISEASE WITH ACUTE EXACERBATION (H): ICD-10-CM

## 2023-01-06 DIAGNOSIS — D84.9 IMMUNOCOMPROMISED (H): ICD-10-CM

## 2023-01-06 DIAGNOSIS — Z94.1 HEART REPLACED BY TRANSPLANT (H): ICD-10-CM

## 2023-01-06 DIAGNOSIS — J11.1 INFLUENZA-LIKE ILLNESS: ICD-10-CM

## 2023-01-06 DIAGNOSIS — N18.32 STAGE 3B CHRONIC KIDNEY DISEASE (H): ICD-10-CM

## 2023-01-06 DIAGNOSIS — J20.9 ACUTE BRONCHITIS WITH COEXISTING CONDITION REQUIRING PROPHYLACTIC TREATMENT: Primary | ICD-10-CM

## 2023-01-06 LAB
FLUAV AG SPEC QL IA: NEGATIVE
FLUBV AG SPEC QL IA: POSITIVE

## 2023-01-06 PROCEDURE — 87804 INFLUENZA ASSAY W/OPTIC: CPT | Performed by: PHYSICIAN ASSISTANT

## 2023-01-06 PROCEDURE — 71046 X-RAY EXAM CHEST 2 VIEWS: CPT | Mod: TC | Performed by: RADIOLOGY

## 2023-01-06 PROCEDURE — U0003 INFECTIOUS AGENT DETECTION BY NUCLEIC ACID (DNA OR RNA); SEVERE ACUTE RESPIRATORY SYNDROME CORONAVIRUS 2 (SARS-COV-2) (CORONAVIRUS DISEASE [COVID-19]), AMPLIFIED PROBE TECHNIQUE, MAKING USE OF HIGH THROUGHPUT TECHNOLOGIES AS DESCRIBED BY CMS-2020-01-R: HCPCS | Performed by: PHYSICIAN ASSISTANT

## 2023-01-06 PROCEDURE — 99215 OFFICE O/P EST HI 40 MIN: CPT | Performed by: PHYSICIAN ASSISTANT

## 2023-01-06 PROCEDURE — U0005 INFEC AGEN DETEC AMPLI PROBE: HCPCS | Performed by: PHYSICIAN ASSISTANT

## 2023-01-06 RX ORDER — DOXYCYCLINE 100 MG/1
100 TABLET ORAL DAILY
Qty: 10 TABLET | Refills: 0 | Status: SHIPPED | OUTPATIENT
Start: 2023-01-06 | End: 2023-01-16

## 2023-01-06 ASSESSMENT — ENCOUNTER SYMPTOMS
NAUSEA: 0
PALPITATIONS: 0
MYALGIAS: 0
DYSURIA: 0
VOMITING: 0
FREQUENCY: 0
SINUS PAIN: 0
NEUROLOGICAL NEGATIVE: 1
ABDOMINAL PAIN: 0
CARDIOVASCULAR NEGATIVE: 1
WHEEZING: 0
CONSTITUTIONAL NEGATIVE: 1
DIARRHEA: 0
SHORTNESS OF BREATH: 0
CHEST TIGHTNESS: 0
COUGH: 1
SORE THROAT: 0
SINUS PRESSURE: 0
ALLERGIC/IMMUNOLOGIC NEGATIVE: 1
HEADACHES: 0
CHILLS: 0
HEMATURIA: 0
MUSCULOSKELETAL NEGATIVE: 1
GASTROINTESTINAL NEGATIVE: 1
FEVER: 0

## 2023-01-06 NOTE — PROGRESS NOTES
Chief Complaint:     Chief Complaint   Patient presents with     URI     Cough, yellow sputum production, headache, runny nose, low grade fever. Negative covid test on Tuesday. Onset- 12/31       Results for orders placed or performed in visit on 01/06/23   XR Chest 2 Views     Status: None (Preliminary result)    Narrative    CHEST TWO VIEWS   1/6/2023 1:23 PM     HISTORY: Influenza-like illness    COMPARISON: Chest x-ray 10/28/2022.      Impression    IMPRESSION: No new acute cardiopulmonary disease identified. Stable  mild atelectasis at the left lung base. Stable sternotomy and old  pacer lead at the mediastinum and upper left chest.   Results for orders placed or performed in visit on 01/06/23   Influenza A & B Antigen - Clinic Collect     Status: Abnormal    Specimen: Nose; Swab   Result Value Ref Range    Influenza A antigen Negative Negative    Influenza B antigen Positive (A) Negative    Narrative    Test results must be correlated with clinical data. If necessary, results should be confirmed by a molecular assay or viral culture.       Medical Decision Making:    Vital signs reviewed by Woody Ernandez PA-C  BP (!) 142/85 (BP Location: Left arm, Patient Position: Sitting, Cuff Size: Adult Regular)   Pulse 104   Temp 97.5  F (36.4  C) (Tympanic)   Wt 91.4 kg (201 lb 6.4 oz)   SpO2 99%   BMI 29.66 kg/m      Differential Diagnosis:  URI Adult/Peds:  Bronchitis-viral, Influenza, Pneumonia, Viral syndrome and Viral upper respiratory illness        ASSESSMENT    1. Acute bronchitis with coexisting condition requiring prophylactic treatment    2. Type 2 diabetes mellitus with diabetic polyneuropathy, without long-term current use of insulin (H)    3. Heart replaced by transplant (H)    4. Immunocompromised (H)    5. Chronic obstructive pulmonary disease with acute exacerbation (H)    6. Stage 3b chronic kidney disease (H)    7. Influenza-like illness        PLAN    Patient is in no acute distress.    Temp  is 97.5 in clinic today, lung sounds were clear, and O2 sats at 99% on RA.    CXR was negative for any acute infiltrates or consolidations per my read.  COPD is controlled at this time.  No indication for steroid treatment. Patient declined refill of inhalers.     Influenza was positive for B.  Patient was positive for influenza B on 10/28/22 and is vaccinated.  No tamiflu at this time due to length of symptoms.    COVID swab collected in clinic today.  With symptoms and Hx, Rx for Doxycycline sent in.  Rest, Push fluids, vaporizer, elevation of head of bed.  Tylenol for any fever or body aches.  NSAIDS contraindicated with CKD.  Over the counter cough suppressant- PRN- as discussed.   Follow up with your PCP in 1 week if symptoms are not improving.  Worrisome symptoms discussed with instructions to go to the ED.  Patient verbalized understanding and agreed with this plan.    52 minutes was spent in the care of this patient including chart review, HPI, ROS, PE, review of plan, and placing of orders.      Labs:    Results for orders placed or performed in visit on 01/06/23   XR Chest 2 Views     Status: None (Preliminary result)    Narrative    CHEST TWO VIEWS   1/6/2023 1:23 PM     HISTORY: Influenza-like illness    COMPARISON: Chest x-ray 10/28/2022.      Impression    IMPRESSION: No new acute cardiopulmonary disease identified. Stable  mild atelectasis at the left lung base. Stable sternotomy and old  pacer lead at the mediastinum and upper left chest.   Results for orders placed or performed in visit on 01/06/23   Influenza A & B Antigen - Clinic Collect     Status: Abnormal    Specimen: Nose; Swab   Result Value Ref Range    Influenza A antigen Negative Negative    Influenza B antigen Positive (A) Negative    Narrative    Test results must be correlated with clinical data. If necessary, results should be confirmed by a molecular assay or viral culture.        Vital signs reviewed by Woody Ernandez PA-C  BP (!)  142/85 (BP Location: Left arm, Patient Position: Sitting, Cuff Size: Adult Regular)   Pulse 104   Temp 97.5  F (36.4  C) (Tympanic)   Wt 91.4 kg (201 lb 6.4 oz)   SpO2 99%   BMI 29.66 kg/m      Current Meds      Current Outpatient Medications:      acetaminophen (TYLENOL) 325 MG tablet, Take 3 tablets (975 mg) by mouth every 6 hours as needed for other (For optimal non-opioid multimodal pain management to improve pain control.), Disp: 100 tablet, Rfl: 1     albuterol (VENTOLIN HFA) 108 (90 Base) MCG/ACT inhaler, INHALE 2 PUFFS BY MOUTH EVERY 4 HOURS AS NEEDED. MAX OF 12 PUFFS PER 24 HOURS, Disp: 18 g, Rfl: 11     allopurinol (ZYLOPRIM) 300 MG tablet, Take 1 tablet (300 mg) by mouth daily, Disp: 90 tablet, Rfl: 3     ALPRAZolam (XANAX) 1 MG tablet, Taken one tablet half an hour before flying. May repeat dose after 6 hours., Disp: 10 tablet, Rfl: 0     aspirin (ASA) 81 MG chewable tablet, Take 1 tablet (81 mg) by mouth daily, Disp: 100 tablet, Rfl: 1     buPROPion (WELLBUTRIN) 75 MG tablet, TAKE 1 TABLET(75 MG) BY MOUTH TWICE DAILY, Disp: 180 tablet, Rfl: 3     calcium citrate-vitamin D (CITRACAL) 315-250 MG-UNIT TABS per tablet, Take 1 tablet by mouth 2 times daily, Disp: 180 tablet, Rfl: 3     docusate sodium (COLACE) 100 MG capsule, Take 1 capsule (100 mg) by mouth 2 times daily, Disp: 60 capsule, Rfl: 3     doxycycline monohydrate (ADOXA) 100 MG tablet, Take 1 tablet (100 mg) by mouth daily for 10 days, Disp: 10 tablet, Rfl: 0     Fluticasone-Umeclidin-Vilanterol (TRELEGY ELLIPTA) 100-62.5-25 MCG/INH oral inhaler, Inhale 1 puff into the lungs daily, Disp: 1 each, Rfl: 11     gabapentin (NEURONTIN) 300 MG capsule, Take 1 capsule (300 mg) by mouth 2 times daily, Disp: 180 capsule, Rfl: 3     Melatonin 10 MG CAPS, Take 1 capsule by mouth At Bedtime, Disp: , Rfl:      montelukast (SINGULAIR) 10 MG tablet, TAKE 1 TABLET(10 MG) BY MOUTH AT BEDTIME, Disp: 90 tablet, Rfl: 3     mycophenolate (GENERIC EQUIVALENT) 250  MG capsule, TAKE 2 CAPSULES(500 MG) BY MOUTH TWICE DAILY, Disp: 360 capsule, Rfl: 3     rosuvastatin (CRESTOR) 10 MG tablet, Take 1 tablet (10 mg) by mouth daily, Disp: 90 tablet, Rfl: 3     tacrolimus (GENERIC EQUIVALENT) 0.5 MG capsule, Take ONE cap with FOUR 1 mg caps (4.5 mg) every AM, Disp: 30 capsule, Rfl: 11     tacrolimus (GENERIC EQUIVALENT) 1 MG capsule, Take FOUR caps with ONE 0.5 mg cap (4.5 mg) every AM and FOUR caps (4 mg) every PM, Disp: 240 capsule, Rfl: 11     traMADol (ULTRAM) 50 MG tablet, One tablet in am, one table in afternoon and two tablet at bedtime, Disp: 120 tablet, Rfl: 3     anakinra (KINERET) 100 MG/0.67ML SOSY injection, Inject 0.67 mLs (100 mg) Subcutaneous daily As directed by rheumatology, Disp: 20.1 mL, Rfl: 6      Respiratory History    occasional episodes of bronchitis, pneumonia and COPD      SUBJECTIVE    HPI: Jim Willingham is an 67 year old male who presents with chest congestion, cough occasional, productive mucoid and yellow, fever, headache and nasal discharge clear.  Patient has a complicated medical Hx including recurrent bronchitis, pneumonia, immunosuppressed status, CKD, COPD, heart transplant, and DM2.  Symptoms began 1  weeks ago and has unchanged.  There is no palpitations, shortness of breath, wheezing or chest pain.  Patient is eating and drinking well.  No nausea, vomiting, or diarrhea.    Patient denies any recent travel or exposure to known COVID positive tested individual.      ROS:     Review of Systems   Constitutional: Negative.  Negative for chills and fever.   HENT: Positive for congestion. Negative for ear discharge, ear pain, sinus pressure, sinus pain and sore throat.    Respiratory: Positive for cough. Negative for chest tightness, shortness of breath and wheezing.    Cardiovascular: Negative.  Negative for chest pain and palpitations.   Gastrointestinal: Negative.  Negative for abdominal pain, diarrhea, nausea and vomiting.   Genitourinary: Negative  for dysuria, frequency, hematuria and urgency.   Musculoskeletal: Negative.  Negative for myalgias.   Skin: Negative for rash.   Allergic/Immunologic: Negative.  Negative for immunocompromised state.   Neurological: Negative.  Negative for headaches.         Family History   Family History   Problem Relation Age of Onset     Cerebrovascular Disease Mother 64     Diabetes Mother      Hypertension Mother      Coronary Artery Disease Father      Diabetes Type 2  Father      Obesity Brother      Obesity Brother      Cerebrovascular Disease Daughter 40        Problem history  Patient Active Problem List   Diagnosis     Stage 3b chronic kidney disease (H)     H/O gastric bypass     Vitamin B12 deficiency (non anemic)     Chronic systolic congestive heart failure (H)     Iron deficiency anemia     Bilateral carpal tunnel syndrome     Rotator cuff tear arthropathy of both shoulders     COPD (chronic obstructive pulmonary disease) (H)     Major depression, recurrent, chronic (H)     Gouty arthropathy, chronic, without tophi     Need for prophylactic antibiotic     S/P orthotopic heart transplant (H)     Abnormal CT scan of lung     Heart replaced by transplant (H)     Generalized muscle weakness     Pulmonary cavitary lesion     Encounter for monitoring tacrolimus therapy     Hx of aspergillosis     Leigh-Barr virus viremia     Type 2 diabetes mellitus with diabetic polyneuropathy (H)     Mild anemia     Status post coronary angiogram     Hyperlipidemia LDL goal <70        Allergies  Allergies   Allergen Reactions     Grass Shortness Of Breath     Ace Inhibitors Cough     Cats      Dust Mites Other (See Comments)     Asthma     Mold Other (See Comments)     Asthma     Penicillins Other (See Comments)     Unknown - childhood exposure    Tolerated Zosyn 9/18-9/20/2020    Per patient report he has tolerated amoxicillin     Sulfa Drugs Other (See Comments) and Unknown     Unknown childhood reaction        Social  History  Social History     Socioeconomic History     Marital status:      Spouse name: Not on file     Number of children: Not on file     Years of education: Not on file     Highest education level: Not on file   Occupational History     Not on file   Tobacco Use     Smoking status: Former     Types: Cigarettes     Quit date:      Years since quittin.0     Smokeless tobacco: Never   Substance and Sexual Activity     Alcohol use: No     Drug use: No     Sexual activity: Yes     Partners: Female   Other Topics Concern     Parent/sibling w/ CABG, MI or angioplasty before 65F 55M? No   Social History Narrative     Not on file     Social Determinants of Health     Financial Resource Strain: Not on file   Food Insecurity: Not on file   Transportation Needs: Not on file   Physical Activity: Not on file   Stress: Not on file   Social Connections: Not on file   Intimate Partner Violence: Not on file   Housing Stability: Not on file        OBJECTIVE     Vital signs reviewed by Woody Ernandez PA-C  BP (!) 142/85 (BP Location: Left arm, Patient Position: Sitting, Cuff Size: Adult Regular)   Pulse 104   Temp 97.5  F (36.4  C) (Tympanic)   Wt 91.4 kg (201 lb 6.4 oz)   SpO2 99%   BMI 29.66 kg/m       Physical Exam  Vitals reviewed.   Constitutional:       General: He is not in acute distress.     Appearance: He is well-developed. He is not ill-appearing, toxic-appearing or diaphoretic.   HENT:      Head: Normocephalic and atraumatic.      Right Ear: Hearing, tympanic membrane, ear canal and external ear normal. No drainage, swelling or tenderness. Tympanic membrane is not perforated, erythematous, retracted or bulging.      Left Ear: Hearing, tympanic membrane, ear canal and external ear normal. No drainage, swelling or tenderness. Tympanic membrane is not perforated, erythematous, retracted or bulging.      Nose: Congestion and rhinorrhea present. No nasal tenderness or mucosal edema.      Right  Turbinates: Not enlarged or swollen.      Left Turbinates: Not enlarged or swollen.      Right Sinus: No maxillary sinus tenderness or frontal sinus tenderness.      Left Sinus: No maxillary sinus tenderness or frontal sinus tenderness.      Mouth/Throat:      Pharynx: Posterior oropharyngeal erythema present. No pharyngeal swelling, oropharyngeal exudate or uvula swelling.      Tonsils: No tonsillar exudate. 0 on the right. 0 on the left.   Eyes:      General: Lids are normal.         Right eye: No discharge.         Left eye: No discharge.      Conjunctiva/sclera: Conjunctivae normal.      Right eye: Right conjunctiva is not injected. No exudate.     Left eye: Left conjunctiva is not injected. No exudate.     Pupils: Pupils are equal, round, and reactive to light.   Cardiovascular:      Rate and Rhythm: Normal rate and regular rhythm.      Heart sounds: Normal heart sounds. No murmur heard.    No friction rub. No gallop.   Pulmonary:      Effort: Pulmonary effort is normal. No accessory muscle usage, respiratory distress or retractions.      Breath sounds: Normal breath sounds and air entry. No stridor, decreased air movement or transmitted upper airway sounds. No decreased breath sounds, wheezing, rhonchi or rales.   Chest:      Chest wall: No tenderness.   Abdominal:      General: Bowel sounds are normal. There is no distension.      Palpations: Abdomen is soft. Abdomen is not rigid. There is no mass.      Tenderness: There is no abdominal tenderness. There is no guarding or rebound.   Musculoskeletal:         General: Normal range of motion.      Cervical back: Normal range of motion and neck supple.   Lymphadenopathy:      Head:      Right side of head: No submental, submandibular, tonsillar, preauricular or posterior auricular adenopathy.      Left side of head: No submental, submandibular, tonsillar, preauricular or posterior auricular adenopathy.      Cervical:      Right cervical: No superficial or  posterior cervical adenopathy.     Left cervical: No superficial or posterior cervical adenopathy.   Skin:     General: Skin is warm.      Capillary Refill: Capillary refill takes less than 2 seconds.   Neurological:      Mental Status: He is alert and oriented to person, place, and time.      Cranial Nerves: No cranial nerve deficit.      Sensory: No sensory deficit.      Motor: No abnormal muscle tone.      Coordination: Coordination normal.      Deep Tendon Reflexes: Reflexes normal.   Psychiatric:         Behavior: Behavior normal. Behavior is cooperative.         Thought Content: Thought content normal.         Judgment: Judgment normal.           Woody Ernandez PA-C  1/6/2023, 12:58 PM

## 2023-01-07 LAB — SARS-COV-2 RNA RESP QL NAA+PROBE: NEGATIVE

## 2023-01-13 DIAGNOSIS — Z94.1 TRANSPLANTED HEART (H): Primary | ICD-10-CM

## 2023-01-16 ENCOUNTER — ANCILLARY PROCEDURE (OUTPATIENT)
Dept: CARDIOLOGY | Facility: CLINIC | Age: 68
End: 2023-01-16
Attending: INTERNAL MEDICINE
Payer: COMMERCIAL

## 2023-01-16 ENCOUNTER — LAB (OUTPATIENT)
Dept: LAB | Facility: CLINIC | Age: 68
End: 2023-01-16
Payer: COMMERCIAL

## 2023-01-16 VITALS
WEIGHT: 209.8 LBS | HEART RATE: 98 BPM | HEIGHT: 68 IN | OXYGEN SATURATION: 97 % | BODY MASS INDEX: 31.8 KG/M2 | DIASTOLIC BLOOD PRESSURE: 86 MMHG | SYSTOLIC BLOOD PRESSURE: 132 MMHG

## 2023-01-16 DIAGNOSIS — Z94.1 HEART REPLACED BY TRANSPLANT (H): ICD-10-CM

## 2023-01-16 DIAGNOSIS — Z94.1 TRANSPLANTED HEART (H): ICD-10-CM

## 2023-01-16 DIAGNOSIS — Z94.1 TRANSPLANTED HEART (H): Primary | ICD-10-CM

## 2023-01-16 LAB
ALBUMIN SERPL BCG-MCNC: 4.1 G/DL (ref 3.5–5.2)
ALP SERPL-CCNC: 150 U/L (ref 40–129)
ALT SERPL W P-5'-P-CCNC: 38 U/L (ref 10–50)
ANION GAP SERPL CALCULATED.3IONS-SCNC: 10 MMOL/L (ref 7–15)
AST SERPL W P-5'-P-CCNC: 34 U/L (ref 10–50)
BASOPHILS # BLD AUTO: 0.1 10E3/UL (ref 0–0.2)
BASOPHILS NFR BLD AUTO: 1 %
BILIRUB SERPL-MCNC: 0.4 MG/DL
BUN SERPL-MCNC: 36.9 MG/DL (ref 8–23)
CALCIUM SERPL-MCNC: 7.7 MG/DL (ref 8.8–10.2)
CHLORIDE SERPL-SCNC: 106 MMOL/L (ref 98–107)
CREAT SERPL-MCNC: 1.47 MG/DL (ref 0.67–1.17)
DEPRECATED HCO3 PLAS-SCNC: 21 MMOL/L (ref 22–29)
EOSINOPHIL # BLD AUTO: 0.2 10E3/UL (ref 0–0.7)
EOSINOPHIL NFR BLD AUTO: 3 %
ERYTHROCYTE [DISTWIDTH] IN BLOOD BY AUTOMATED COUNT: 14 % (ref 10–15)
GFR SERPL CREATININE-BSD FRML MDRD: 53 ML/MIN/1.73M2
GLUCOSE SERPL-MCNC: 196 MG/DL (ref 70–99)
HCT VFR BLD AUTO: 38.5 % (ref 40–53)
HGB BLD-MCNC: 11.6 G/DL (ref 13.3–17.7)
IMM GRANULOCYTES # BLD: 0 10E3/UL
IMM GRANULOCYTES NFR BLD: 1 %
LVEF ECHO: NORMAL
LYMPHOCYTES # BLD AUTO: 1 10E3/UL (ref 0.8–5.3)
LYMPHOCYTES NFR BLD AUTO: 13 %
MAGNESIUM SERPL-MCNC: 2 MG/DL (ref 1.7–2.3)
MCH RBC QN AUTO: 29.9 PG (ref 26.5–33)
MCHC RBC AUTO-ENTMCNC: 30.1 G/DL (ref 31.5–36.5)
MCV RBC AUTO: 99 FL (ref 78–100)
MONOCYTES # BLD AUTO: 0.4 10E3/UL (ref 0–1.3)
MONOCYTES NFR BLD AUTO: 5 %
NEUTROPHILS # BLD AUTO: 6 10E3/UL (ref 1.6–8.3)
NEUTROPHILS NFR BLD AUTO: 77 %
NRBC # BLD AUTO: 0 10E3/UL
NRBC BLD AUTO-RTO: 0 /100
PHOSPHATE SERPL-MCNC: 3.6 MG/DL (ref 2.5–4.5)
PLATELET # BLD AUTO: 276 10E3/UL (ref 150–450)
POTASSIUM SERPL-SCNC: 5.4 MMOL/L (ref 3.4–5.3)
PROT SERPL-MCNC: 6.4 G/DL (ref 6.4–8.3)
RBC # BLD AUTO: 3.88 10E6/UL (ref 4.4–5.9)
SODIUM SERPL-SCNC: 137 MMOL/L (ref 136–145)
WBC # BLD AUTO: 7.7 10E3/UL (ref 4–11)

## 2023-01-16 PROCEDURE — 80053 COMPREHEN METABOLIC PANEL: CPT | Performed by: PATHOLOGY

## 2023-01-16 PROCEDURE — G0463 HOSPITAL OUTPT CLINIC VISIT: HCPCS | Performed by: INTERNAL MEDICINE

## 2023-01-16 PROCEDURE — 80197 ASSAY OF TACROLIMUS: CPT | Performed by: INTERNAL MEDICINE

## 2023-01-16 PROCEDURE — 83735 ASSAY OF MAGNESIUM: CPT | Performed by: PATHOLOGY

## 2023-01-16 PROCEDURE — 85025 COMPLETE CBC W/AUTO DIFF WBC: CPT | Performed by: PATHOLOGY

## 2023-01-16 PROCEDURE — 93306 TTE W/DOPPLER COMPLETE: CPT | Performed by: INTERNAL MEDICINE

## 2023-01-16 PROCEDURE — G0463 HOSPITAL OUTPT CLINIC VISIT: HCPCS

## 2023-01-16 PROCEDURE — 99214 OFFICE O/P EST MOD 30 MIN: CPT | Mod: 25 | Performed by: INTERNAL MEDICINE

## 2023-01-16 PROCEDURE — 87799 DETECT AGENT NOS DNA QUANT: CPT | Performed by: INTERNAL MEDICINE

## 2023-01-16 PROCEDURE — 36415 COLL VENOUS BLD VENIPUNCTURE: CPT | Performed by: PATHOLOGY

## 2023-01-16 PROCEDURE — 84100 ASSAY OF PHOSPHORUS: CPT | Performed by: PATHOLOGY

## 2023-01-16 ASSESSMENT — PAIN SCALES - GENERAL: PAINLEVEL: NO PAIN (0)

## 2023-01-16 NOTE — NURSING NOTE
Transplant Coordinator Note    Reason for visit: 20 month follow up (late)   Coordinator: Present        Health concerns addressed today:  1. Potassium 5.4. Hemolyzed??  2.   3.       Immunosuppressants:  Tacrolimus, goal 4-6, currently taking 4.5 mg in the AM and 4 mg in the PM, recheck pending.   mg twice daily.     Routine screenings:     Derm: 5/25/22   Dental: Due   Colonoscopy: Last  8/2021   Prostate: 0.35   Eye: Due   Flu/Pneumonia: up to date    Covid: 2/3, 3/11.  4th on 6/8/22.   Shingles: Due   Evusheld: 6/2022       Resulted labs and echo reviewed with patient  Medication record reviewed and reconciled  Questions and concerns addressed  Pt verbalized an understanding of plan of care.     Patient Instructions  1. When you have time, please repeat a bmp.   2. Keep everything the same!   3. Keep up the good work!  4. Please follow up with your PMD re: testosterone shots. Ok from a cardiology perspective.     Next transplant clinic appointment: 2nd annual in May.   Next lab draw: 1 week.   Coordinator will call with all pending results.     Please call transplant coordinator with any questions:    Yolette Rueda RN BSN   Post Heart Transplant Nurse Coordinator  [facility]  Questions: [phone]

## 2023-01-16 NOTE — LETTER
1/16/2023      RE: Jim Willingham  [address] [facility] [address]       Dear Colleague,    Thank you for the opportunity to participate in the care of your patient, Jim Willingham, at the [facility] [place] at [facility]. Please see a copy of my visit note below.    ADULT HEART TRANSPLANT CLINIC    January 16, 2023    Mr. iJm Willingham is a 66yr old male with a history of NICM (s/p HM2 LVAD 6/2017) s/p OHT 5/6/21,     Since her last visit he reports feeling the best he has felt in years.  He feels like he is in his early 40s.  Went to [place] to [place] with his granddaughter this past year.     He denies PND orthopnea or other heart failure symptoms.    Tolerating his immunosuppression. No diarrhea.     Did have influenza this fall, full recovery.     Transplant-related complications initially:   c/b right pleural air leak (required prolonged chest tube),CAP (RLL, 6/2021), recurrent pleural effusions (s/p thoracenteses x2 6/2021 and 7/2021, exudative, repeatedly cultured negative), RLL cavitary lesions (per chest CT 7/14/21, BD glucan indeterminate, aspergillus negative, not started on antifungals), pericardial effusion (1.4cm per TTE 7/12/21, conservatively managed), low-grade EBV viremia, recurrent/persistent gout flare, transaminase elevation with questionable choledocholithiasis (conservatively managed with resolution), COVID-19 (8/2021, s/p monoclonal antibodies and remdesivir x5 days), and 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      Rejection history:  none  DSAs:  none  Graft function: Normal  Opportunistic infections: EBV viremia which was been low-grade as well as cavitary lung lesions positive Aspergillus status post treatment    Serostatus:  CMV D+/R+  EBV D+/R+  Toxo D-/R-      PAST MEDICAL HISTORY:    Past Medical History:   Diagnosis Date     Bariatric surgery status 2003     Benign  essential hypertension 05/11/2017     Bilateral carpal tunnel syndrome 11/02/2020     Cardiomyopathy, unspecified (H) 05/08/2017     CKD (chronic kidney disease) stage 3, GFR 30-59 ml/min (H) 05/11/2017     COPD (chronic obstructive pulmonary disease) (H) 11/02/2020     Depression 05/11/2017     Diabetes mellitus (H) 1995     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 7.66 cm, Restrictive pattern , Severe mitral valve regurgitation     CECILIA (obstructive sleep apnea) 05/11/2017     Paroxysmal atrial fibrillation (H) 05/11/2017     Paroxysmal VT (H) 05/11/2017     Pulmonary cavitary lesion 11/18/2021     Rotator cuff tear arthropathy of both shoulders 11/02/2020     Type 2 diabetes mellitus with diabetic polyneuropathy (H) 3/18/2022     Uncomplicated asthma      Vitamin B12 deficiency (non anemic) 05/11/2017       CURRENT MEDICATIONS:  Current Outpatient Medications   Medication Sig Dispense Refill     acetaminophen (TYLENOL) 325 MG tablet Take 3 tablets (975 mg) by mouth every 6 hours as needed for other (For optimal non-opioid multimodal pain management to improve pain control.) 100 tablet 1     albuterol (VENTOLIN HFA) 108 (90 Base) MCG/ACT inhaler INHALE 2 PUFFS BY MOUTH EVERY 4 HOURS AS NEEDED. MAX OF 12 PUFFS PER 24 HOURS 18 g 11     allopurinol (ZYLOPRIM) 300 MG tablet Take 1 tablet (300 mg) by mouth daily 90 tablet 3     ALPRAZolam (XANAX) 1 MG tablet Taken one tablet half an hour before flying. May repeat dose after 6 hours. 10 tablet 0     aspirin (ASA) 81 MG chewable tablet Take 1 tablet (81  mg) by mouth daily 100 tablet 1     buPROPion (WELLBUTRIN) 75 MG tablet TAKE 1 TABLET(75 MG) BY MOUTH TWICE DAILY 180 tablet 3     calcium citrate-vitamin D (CITRACAL) 315-250 MG-UNIT TABS per tablet Take 1 tablet by mouth 2 times daily 180 tablet 3     docusate sodium (COLACE) 100 MG capsule Take 1 capsule (100 mg) by mouth 2 times daily 60 capsule 3     gabapentin (NEURONTIN) 300 MG capsule Take 1 capsule (300 mg) by mouth 2 times daily 180 capsule 3     Melatonin 10 MG CAPS Take 1 capsule by mouth At Bedtime       montelukast (SINGULAIR) 10 MG tablet TAKE 1 TABLET(10 MG) BY MOUTH AT BEDTIME 90 tablet 3     mycophenolate (GENERIC EQUIVALENT) 250 MG capsule TAKE 2 CAPSULES(500 MG) BY MOUTH TWICE DAILY 360 capsule 3     rosuvastatin (CRESTOR) 10 MG tablet Take 1 tablet (10 mg) by mouth daily 90 tablet 3     tacrolimus (GENERIC EQUIVALENT) 0.5 MG capsule Take ONE cap with FOUR 1 mg caps (4.5 mg) every AM 30 capsule 11     tacrolimus (GENERIC EQUIVALENT) 1 MG capsule Take FOUR caps with ONE 0.5 mg cap (4.5 mg) every AM and FOUR caps (4 mg) every  capsule 11     traMADol (ULTRAM) 50 MG tablet One tablet in am, one table in afternoon and two tablet at bedtime 120 tablet 3     anakinra (KINERET) 100 MG/0.67ML SOSY injection Inject 0.67 mLs (100 mg) Subcutaneous daily As directed by rheumatology 20.1 mL 6     doxycycline monohydrate (ADOXA) 100 MG tablet Take 1 tablet (100 mg) by mouth daily for 10 days (Patient not taking: Reported on 1/16/2023) 10 tablet 0     Fluticasone-Umeclidin-Vilanterol (TRELEGY ELLIPTA) 100-62.5-25 MCG/INH oral inhaler Inhale 1 puff into the lungs daily (Patient not taking: Reported on 1/16/2023) 1 each 11           ROS:   Constitutional: No fever, chills, or sweats. Weight stable   ENT: No visual disturbance, ear ache, epistaxis, sore throat.   Allergies/Immunologic: Negative.   Respiratory: some chronic cough, no hemoptysis.   Cardiovascular: As per HPI.   GI: No nausea, vomiting,  "hematemesis, melena, or hematochezia.   : No urinary frequency, dysuria, or hematuria.   Integument: Negative.   Psychiatric: Negative.   Neuro: Negative  Endocrinology: neg   Musculoskeletal: gout controlled       Exam:  /86 (BP Location: Right arm, Patient Position: Sitting, Cuff Size: Adult Regular)   Pulse 98   Ht 1.72 m (5' 7.72\")   Wt 95.2 kg (209 lb 12.8 oz)   SpO2 97%   BMI 32.17 kg/m    General: the patient is a pleasant male in no apparent distress.    HEENT: NC/AT. PERRLA. EOMI.  Sclerae white, not injected.    Neck:  No adenopathy, No thyromegaly.    Cardiac: No jugular venous distention when sitting upright.  RRR.  Normal S1 S2 splits physiologically.  No murmur, rub click, or gallop.     Abdomen: soft, nontender, nondistended.  No organomegaly.  Extremities:  No clubbing, cyanosis, or LE edema.    Neuro: Alert & Oriented x 3, grossly non focal.  Integument: no open lesions, rashes, or jaundice.      Echo: 1/16/2022 personally reviewed   Echocardiogram with two-dimensional, color and spectral Doppler performed.  Interpretation Summary  Global and regional left ventricular function is normal with an EF of 60-65%.  Right ventricular function, chamber size, wall motion, and thickness are  normal.  Pulmonary artery systolic pressure is normal.  Dilation of the inferior vena cava is present with normal respiratory  variation in diameter.  No pericardial effusion is present.  No significant changes noted.      DSA negative  allomap 35      Last Immuknow: 348     Cr 1.4 (improved)   Na 137   K 5.4     WBC 7.7   Hgb: 11.6   Plt: 212      Assessment and Plan:  Mr. Jim Willingham is a 67 yr old male with a history of NICM (s/p HM2 LVAD 6/2017) s/p OHT 5/6/21     Initial post transplant course was complex as detailed above but he is doing very well now.      graft function is normal, he has no history of rejection, immunknow with a goal range he has no donor specific antibodies. "       Immunosuppression:  - MMF 500mg twice daily  - tacro, goal level 4-6.       PPx:  - CAV:  Aspirin 81mg daily and rosuvastatin 10mg daily.  - GI: Pantoprazole 40mg daily  - Osteoporosis: Calcium/vitamin D supplements     Rejection history:  none  AlloMap scores:  < 35 recently  DSAs:  none      Serostatus:  - CMV D+/R+  - EBV D+/R+  - Toxo D-/R-     KALI cavitary lesion  +Aspergillus (9/2021)  S/p 2 months of voriconazole- resolved .     EBV viremia- low  level   - stable He remains asymptomatic.         CKD-stable to improved     Health care maintenance: per coordinator note     RTC for 2 nd annual         Please do not hesitate to contact me if you have any questions/concerns.     Sincerely,     Delisa Montgomery MD      CC  Patient Care Team:  Ricardo Gómez MD as PCP - General (Internal Medicine)  Elfego Hayden MD as MD (Internal Medicine)  Delisa Montgomery MD as Referring Physician (Cardiology)  Chase Qureshi MD as MD (Internal Medicine-Hematology & Oncology)  Kadie Pedro LICSW as  ( - Clinical)  Elbert Pettit MD as Assigned Rheumatology Provider  Nba Ag DO as Assigned Musculoskeletal Provider  Yolette Rueda, REGINALD as Transplant Coordinator  Akshat Parkinson [facility] as Pharmacist (Pharmacist)  Marcos Washington MD as MD (Infectious Diseases)  Lyle Sarmiento DO as MD (Nephrology)  Anisa Quiñones MD as Assigned Infectious Disease Provider  Ricardo Gómez MD as Assigned PCP  Sheyla Fletcher MD as Assigned Sleep Provider  Corey Melendrez MD as MD (Dermatology)  Nba Aguirre MD as MD (Neurology)  Akshat Parkinson [facility] as Assigned MTM Pharmacist  Nba Aguirre MD as Assigned Neuroscience Provider

## 2023-01-16 NOTE — NURSING NOTE
Chief Complaint   Patient presents with     Follow Up     Return heart transplant           Vitals were taken and medications reconciled.     Perfecto Mckeon, EMT   3:52 PM

## 2023-01-16 NOTE — PROGRESS NOTES
ADULT HEART TRANSPLANT CLINIC    January 16, 2023    Mr. Jim Willingham is a 66yr old male with a history of NICM (s/p HM2 LVAD 6/2017) s/p OHT 5/6/21,     Since her last visit he reports feeling the best he has felt in years.  He feels like he is in his early 40s.  Went to [place] to [place] with his granddaughter this past year.     He denies PND orthopnea or other heart failure symptoms.    Tolerating his immunosuppression. No diarrhea.     Did have influenza this fall, full recovery.     Transplant-related complications initially:   c/b right pleural air leak (required prolonged chest tube),CAP (RLL, 6/2021), recurrent pleural effusions (s/p thoracenteses x2 6/2021 and 7/2021, exudative, repeatedly cultured negative), RLL cavitary lesions (per chest CT 7/14/21, BD glucan indeterminate, aspergillus negative, not started on antifungals), pericardial effusion (1.4cm per TTE 7/12/21, conservatively managed), low-grade EBV viremia, recurrent/persistent gout flare, transaminase elevation with questionable choledocholithiasis (conservatively managed with resolution), COVID-19 (8/2021, s/p monoclonal antibodies and remdesivir x5 days), and 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      Rejection history:  none  DSAs:  none  Graft function: Normal  Opportunistic infections: EBV viremia which was been low-grade as well as cavitary lung lesions positive Aspergillus status post treatment    Serostatus:  CMV D+/R+  EBV D+/R+  Toxo D-/R-      PAST MEDICAL HISTORY:    Past Medical History:   Diagnosis Date     Bariatric surgery status 2003     Benign essential hypertension 05/11/2017     Bilateral carpal tunnel syndrome 11/02/2020     Cardiomyopathy, unspecified (H) 05/08/2017     CKD (chronic kidney disease) stage 3, GFR 30-59 ml/min (H) 05/11/2017     COPD (chronic obstructive pulmonary disease) (H) 11/02/2020     Depression 05/11/2017     Diabetes mellitus (H) 1995     Leigh-Barr virus  viremia 3/18/2022     Gouty arthropathy, chronic, without tophi 11/02/2020     H/O gastric bypass 05/11/2017     Hyperlipidemia LDL goal <70 9/27/2022     ICD (implantable cardioverter-defibrillator), biventricular, in situ 05/11/2017     LVAD (left ventricular assist device) present (H)      Major depression, recurrent, chronic (H) 11/02/2020     Mild anemia 3/18/2022     NICM (nonischemic cardiomyopathy) (H)/ EF 20% 05/11/2017    ECHO: LVEDd. 7.66 cm, Restrictive pattern , Severe mitral valve regurgitation     CECILIA (obstructive sleep apnea) 05/11/2017     Paroxysmal atrial fibrillation (H) 05/11/2017     Paroxysmal VT (H) 05/11/2017     Pulmonary cavitary lesion 11/18/2021     Rotator cuff tear arthropathy of both shoulders 11/02/2020     Type 2 diabetes mellitus with diabetic polyneuropathy (H) 3/18/2022     Uncomplicated asthma      Vitamin B12 deficiency (non anemic) 05/11/2017       CURRENT MEDICATIONS:  Current Outpatient Medications   Medication Sig Dispense Refill     acetaminophen (TYLENOL) 325 MG tablet Take 3 tablets (975 mg) by mouth every 6 hours as needed for other (For optimal non-opioid multimodal pain management to improve pain control.) 100 tablet 1     albuterol (VENTOLIN HFA) 108 (90 Base) MCG/ACT inhaler INHALE 2 PUFFS BY MOUTH EVERY 4 HOURS AS NEEDED. MAX OF 12 PUFFS PER 24 HOURS 18 g 11     allopurinol (ZYLOPRIM) 300 MG tablet Take 1 tablet (300 mg) by mouth daily 90 tablet 3     ALPRAZolam (XANAX) 1 MG tablet Taken one tablet half an hour before flying. May repeat dose after 6 hours. 10 tablet 0     aspirin (ASA) 81 MG chewable tablet Take 1 tablet (81 mg) by mouth daily 100 tablet 1     buPROPion (WELLBUTRIN) 75 MG tablet TAKE 1 TABLET(75 MG) BY MOUTH TWICE DAILY 180 tablet 3     calcium citrate-vitamin D (CITRACAL) 315-250 MG-UNIT TABS per tablet Take 1 tablet by mouth 2 times daily 180 tablet 3     docusate sodium (COLACE) 100 MG capsule Take 1 capsule (100 mg) by mouth 2 times daily 60  "capsule 3     gabapentin (NEURONTIN) 300 MG capsule Take 1 capsule (300 mg) by mouth 2 times daily 180 capsule 3     Melatonin 10 MG CAPS Take 1 capsule by mouth At Bedtime       montelukast (SINGULAIR) 10 MG tablet TAKE 1 TABLET(10 MG) BY MOUTH AT BEDTIME 90 tablet 3     mycophenolate (GENERIC EQUIVALENT) 250 MG capsule TAKE 2 CAPSULES(500 MG) BY MOUTH TWICE DAILY 360 capsule 3     rosuvastatin (CRESTOR) 10 MG tablet Take 1 tablet (10 mg) by mouth daily 90 tablet 3     tacrolimus (GENERIC EQUIVALENT) 0.5 MG capsule Take ONE cap with FOUR 1 mg caps (4.5 mg) every AM 30 capsule 11     tacrolimus (GENERIC EQUIVALENT) 1 MG capsule Take FOUR caps with ONE 0.5 mg cap (4.5 mg) every AM and FOUR caps (4 mg) every  capsule 11     traMADol (ULTRAM) 50 MG tablet One tablet in am, one table in afternoon and two tablet at bedtime 120 tablet 3     anakinra (KINERET) 100 MG/0.67ML SOSY injection Inject 0.67 mLs (100 mg) Subcutaneous daily As directed by rheumatology 20.1 mL 6     doxycycline monohydrate (ADOXA) 100 MG tablet Take 1 tablet (100 mg) by mouth daily for 10 days (Patient not taking: Reported on 1/16/2023) 10 tablet 0     Fluticasone-Umeclidin-Vilanterol (TRELEGY ELLIPTA) 100-62.5-25 MCG/INH oral inhaler Inhale 1 puff into the lungs daily (Patient not taking: Reported on 1/16/2023) 1 each 11           ROS:   Constitutional: No fever, chills, or sweats. Weight stable   ENT: No visual disturbance, ear ache, epistaxis, sore throat.   Allergies/Immunologic: Negative.   Respiratory: some chronic cough, no hemoptysis.   Cardiovascular: As per HPI.   GI: No nausea, vomiting, hematemesis, melena, or hematochezia.   : No urinary frequency, dysuria, or hematuria.   Integument: Negative.   Psychiatric: Negative.   Neuro: Negative  Endocrinology: neg   Musculoskeletal: gout controlled       Exam:  /86 (BP Location: Right arm, Patient Position: Sitting, Cuff Size: Adult Regular)   Pulse 98   Ht 1.72 m (5' 7.72\")   " Wt 95.2 kg (209 lb 12.8 oz)   SpO2 97%   BMI 32.17 kg/m    General: the patient is a pleasant male in no apparent distress.    HEENT: NC/AT. PERRLA. EOMI.  Sclerae white, not injected.    Neck:  No adenopathy, No thyromegaly.    Cardiac: No jugular venous distention when sitting upright.  RRR.  Normal S1 S2 splits physiologically.  No murmur, rub click, or gallop.     Abdomen: soft, nontender, nondistended.  No organomegaly.  Extremities:  No clubbing, cyanosis, or LE edema.    Neuro: Alert & Oriented x 3, grossly non focal.  Integument: no open lesions, rashes, or jaundice.      Echo: 1/16/2022 personally reviewed   Echocardiogram with two-dimensional, color and spectral Doppler performed.  Interpretation Summary  Global and regional left ventricular function is normal with an EF of 60-65%.  Right ventricular function, chamber size, wall motion, and thickness are  normal.  Pulmonary artery systolic pressure is normal.  Dilation of the inferior vena cava is present with normal respiratory  variation in diameter.  No pericardial effusion is present.  No significant changes noted.      DSA negative  allomap 35      Last Immuknow: 348     Cr 1.4 (improved)   Na 137   K 5.4     WBC 7.7   Hgb: 11.6   Plt: 212      Assessment and Plan:  Mr. Jim Willingham is a 67 yr old male with a history of NICM (s/p HM2 LVAD 6/2017) s/p OHT 5/6/21     Initial post transplant course was complex as detailed above but he is doing very well now.      graft function is normal, he has no history of rejection, immunknow with a goal range he has no donor specific antibodies.       Immunosuppression:  - MMF 500mg twice daily  - tacro, goal level 4-6.       PPx:  - CAV:  Aspirin 81mg daily and rosuvastatin 10mg daily.  - GI: Pantoprazole 40mg daily  - Osteoporosis: Calcium/vitamin D supplements     Rejection history:  none  AlloMap scores:  < 35 recently  DSAs:  none      Serostatus:  - CMV D+/R+  - EBV D+/R+  - Toxo D-/R-     KALI cavitary  lesion  +Aspergillus (9/2021)  S/p 2 months of voriconazole- resolved .     EBV viremia- low  level   - stable He remains asymptomatic.         CKD-stable to improved     Health care maintenance: per coordinator note     RTC for 2 nd annual         CC  Patient Care Team:  Tommy Gómez MD as PCP - General (Internal Medicine)  Elfego Hayden MD as MD (Internal Medicine)  Delisa Montgomery MD as Referring Physician (Cardiology)  Chase Qureshi MD as MD (Internal Medicine-Hematology & Oncology)  Kadie Pedro LICSW as  ( - Clinical)  Elbert Pettit MD as Assigned Rheumatology Provider  Nba Ag DO as Assigned Musculoskeletal Provider  Yolette Rueda RN as Transplant Coordinator  Akshat Parkinson [facility] as Pharmacist (Pharmacist)  Marcos Washington MD as MD (Infectious Diseases)  Lyle Sarmiento DO as MD (Nephrology)  Anisa Quiñones MD as Assigned Infectious Disease Provider  Tommy Gómez MD as Assigned PCP  Sheyla Fletcher MD as Assigned Sleep Provider  Corey Melendrez MD as MD (Dermatology)  Delisa Montgomery MD as Referring Physician (Cardiovascular Disease)  Delisa Montgomery MD as Assigned Heart and Vascular Provider  Nba Aguirre MD as MD (Neurology)  Akshat Parkinson, [facility] as Assigned MTM Pharmacist  Nba Aguirre MD as Assigned Neuroscience Provider  Tommy Gómez MD as Assigned Pain Medication Provider  TOMMY GÓMEZ

## 2023-01-16 NOTE — PATIENT INSTRUCTIONS
Patient Instructions  1. When you have time, please repeat a bmp.   2. I will contact you if any changes with your blood work today.   3. Keep up the good work!  4. Please follow up with your PMD re: testosterone shots. Ok from a cardiology perspective.     Next transplant clinic appointment: 2nd annual in May.   Next lab draw: 1 week.   Coordinator will call with all pending results.     Please call transplant coordinator with any questions:    Yolette Rueda RN BSN   Post Heart Transplant Nurse Coordinator  [facility]  Questions: [phone]

## 2023-01-17 LAB
EBV DNA # SPEC NAA+PROBE: <500 COPIES/ML
EBV DNA SPEC NAA+PROBE-LOG#: <2.7 {LOG_COPIES}/ML
TACROLIMUS BLD-MCNC: 5.4 UG/L (ref 5–15)
TME LAST DOSE: NORMAL H
TME LAST DOSE: NORMAL H

## 2023-02-15 DIAGNOSIS — Z94.1 TRANSPLANTED HEART (H): Primary | ICD-10-CM

## 2023-02-18 ENCOUNTER — HEALTH MAINTENANCE LETTER (OUTPATIENT)
Age: 68
End: 2023-02-18

## 2023-02-28 LAB — SCANNED LAB RESULT: NORMAL

## 2023-03-06 NOTE — NURSING NOTE
Chief Complaint   Patient presents with     Follow Up For     VAD     Vitals were taken and medications were reconciled.     Luis E Hernandez MA  11:46 AM     Detail Level: Detailed Quality 226: Preventive Care And Screening: Tobacco Use: Screening And Cessation Intervention: Patient screened for tobacco use and is an ex/non-smoker Quality 130: Documentation Of Current Medications In The Medical Record: Current Medications Documented

## 2023-03-17 ENCOUNTER — LAB (OUTPATIENT)
Dept: LAB | Facility: CLINIC | Age: 68
End: 2023-03-17
Payer: COMMERCIAL

## 2023-03-17 DIAGNOSIS — Z94.1 TRANSPLANTED HEART (H): ICD-10-CM

## 2023-03-17 LAB
ANION GAP SERPL CALCULATED.3IONS-SCNC: 2 MMOL/L (ref 3–14)
BUN SERPL-MCNC: 35 MG/DL (ref 7–30)
CALCIUM SERPL-MCNC: 7.3 MG/DL (ref 8.5–10.1)
CHLORIDE BLD-SCNC: 116 MMOL/L (ref 94–109)
CO2 SERPL-SCNC: 23 MMOL/L (ref 20–32)
CREAT SERPL-MCNC: 1.85 MG/DL (ref 0.66–1.25)
GFR SERPL CREATININE-BSD FRML MDRD: 40 ML/MIN/1.73M2
GLUCOSE BLD-MCNC: 68 MG/DL (ref 70–99)
POTASSIUM BLD-SCNC: 4.7 MMOL/L (ref 3.4–5.3)
SODIUM SERPL-SCNC: 141 MMOL/L (ref 133–144)

## 2023-03-17 PROCEDURE — 80048 BASIC METABOLIC PNL TOTAL CA: CPT

## 2023-03-17 PROCEDURE — 36415 COLL VENOUS BLD VENIPUNCTURE: CPT

## 2023-03-19 DIAGNOSIS — Z94.1 S/P ORTHOTOPIC HEART TRANSPLANT (H): ICD-10-CM

## 2023-03-20 ENCOUNTER — TELEPHONE (OUTPATIENT)
Dept: TRANSPLANT | Facility: CLINIC | Age: 68
End: 2023-03-20
Payer: COMMERCIAL

## 2023-03-20 DIAGNOSIS — Z94.1 TRANSPLANTED HEART (H): Primary | ICD-10-CM

## 2023-03-20 RX ORDER — TRAMADOL HYDROCHLORIDE 50 MG/1
TABLET ORAL
Qty: 120 TABLET | Refills: 0 | Status: SHIPPED | OUTPATIENT
Start: 2023-03-20 | End: 2023-06-13

## 2023-03-20 NOTE — TELEPHONE ENCOUNTER
Repeat labs cr = 1.85. Pt states weight is the same and no fluid retention in belly or legs. Pt is doing door dash and he's not really drinking enough. Last tac was on 1/16 = 5.4. Writer suggested focusing on hydration and repeat labs in 1-2 days with a tacrolimus. Orders in. Pt verbalized understanding.

## 2023-03-28 ENCOUNTER — OFFICE VISIT (OUTPATIENT)
Dept: FAMILY MEDICINE | Facility: CLINIC | Age: 68
End: 2023-03-28
Payer: COMMERCIAL

## 2023-03-28 VITALS
RESPIRATION RATE: 26 BRPM | HEART RATE: 98 BPM | SYSTOLIC BLOOD PRESSURE: 122 MMHG | TEMPERATURE: 98.4 F | DIASTOLIC BLOOD PRESSURE: 77 MMHG | HEIGHT: 66 IN | BODY MASS INDEX: 32.3 KG/M2 | WEIGHT: 201 LBS | OXYGEN SATURATION: 97 %

## 2023-03-28 DIAGNOSIS — E53.8 VITAMIN B12 DEFICIENCY (NON ANEMIC): ICD-10-CM

## 2023-03-28 DIAGNOSIS — F33.9 MAJOR DEPRESSION, RECURRENT, CHRONIC (H): ICD-10-CM

## 2023-03-28 DIAGNOSIS — M75.101 ROTATOR CUFF TEAR ARTHROPATHY OF BOTH SHOULDERS: ICD-10-CM

## 2023-03-28 DIAGNOSIS — G56.03 BILATERAL CARPAL TUNNEL SYNDROME: ICD-10-CM

## 2023-03-28 DIAGNOSIS — E78.5 HYPERLIPIDEMIA LDL GOAL <70: ICD-10-CM

## 2023-03-28 DIAGNOSIS — M1A.00X0 GOUTY ARTHROPATHY, CHRONIC, WITHOUT TOPHI: ICD-10-CM

## 2023-03-28 DIAGNOSIS — M1A.3790 CHRONIC GOUT DUE TO RENAL IMPAIRMENT INVOLVING FOOT WITHOUT TOPHUS, UNSPECIFIED LATERALITY: ICD-10-CM

## 2023-03-28 DIAGNOSIS — Z98.84 H/O GASTRIC BYPASS: ICD-10-CM

## 2023-03-28 DIAGNOSIS — J44.9 CHRONIC OBSTRUCTIVE PULMONARY DISEASE, UNSPECIFIED COPD TYPE (H): ICD-10-CM

## 2023-03-28 DIAGNOSIS — M12.812 ROTATOR CUFF TEAR ARTHROPATHY OF BOTH SHOULDERS: ICD-10-CM

## 2023-03-28 DIAGNOSIS — R53.83 OTHER FATIGUE: ICD-10-CM

## 2023-03-28 DIAGNOSIS — G62.9 PERIPHERAL POLYNEUROPATHY: ICD-10-CM

## 2023-03-28 DIAGNOSIS — M75.102 ROTATOR CUFF TEAR ARTHROPATHY OF BOTH SHOULDERS: ICD-10-CM

## 2023-03-28 DIAGNOSIS — M12.811 ROTATOR CUFF TEAR ARTHROPATHY OF BOTH SHOULDERS: ICD-10-CM

## 2023-03-28 DIAGNOSIS — J32.9 CHRONIC CONGESTION OF PARANASAL SINUS: Primary | ICD-10-CM

## 2023-03-28 DIAGNOSIS — Z94.1 S/P ORTHOTOPIC HEART TRANSPLANT (H): ICD-10-CM

## 2023-03-28 DIAGNOSIS — N18.32 STAGE 3B CHRONIC KIDNEY DISEASE (H): ICD-10-CM

## 2023-03-28 DIAGNOSIS — Z86.39 HISTORY OF TYPE 2 DIABETES MELLITUS: ICD-10-CM

## 2023-03-28 DIAGNOSIS — Z94.1 TRANSPLANTED HEART (H): ICD-10-CM

## 2023-03-28 DIAGNOSIS — M62.81 GENERALIZED MUSCLE WEAKNESS: Primary | ICD-10-CM

## 2023-03-28 PROBLEM — I50.22 CHRONIC SYSTOLIC CONGESTIVE HEART FAILURE (H): Status: RESOLVED | Noted: 2018-07-10 | Resolved: 2023-03-28

## 2023-03-28 PROBLEM — E11.42 TYPE 2 DIABETES MELLITUS WITH DIABETIC POLYNEUROPATHY (H): Status: RESOLVED | Noted: 2022-03-18 | Resolved: 2023-03-28

## 2023-03-28 LAB
ANION GAP SERPL CALCULATED.3IONS-SCNC: 4 MMOL/L (ref 3–14)
BUN SERPL-MCNC: 25 MG/DL (ref 7–30)
CALCIUM SERPL-MCNC: 8.3 MG/DL (ref 8.5–10.1)
CHLORIDE BLD-SCNC: 111 MMOL/L (ref 94–109)
CO2 SERPL-SCNC: 23 MMOL/L (ref 20–32)
CREAT SERPL-MCNC: 1.86 MG/DL (ref 0.66–1.25)
CREAT UR-MCNC: 207 MG/DL
GFR SERPL CREATININE-BSD FRML MDRD: 40 ML/MIN/1.73M2
GLUCOSE BLD-MCNC: 107 MG/DL (ref 70–99)
HBA1C MFR BLD: 5.4 % (ref 0–5.6)
MICROALBUMIN UR-MCNC: 76 MG/L
MICROALBUMIN/CREAT UR: 36.71 MG/G CR (ref 0–17)
POTASSIUM BLD-SCNC: 4.3 MMOL/L (ref 3.4–5.3)
PTH-INTACT SERPL-MCNC: 529 PG/ML (ref 15–65)
SODIUM SERPL-SCNC: 138 MMOL/L (ref 133–144)
TACROLIMUS BLD-MCNC: 6.5 UG/L (ref 5–15)
TME LAST DOSE: NORMAL H
TME LAST DOSE: NORMAL H
VIT B12 SERPL-MCNC: 367 PG/ML (ref 232–1245)

## 2023-03-28 PROCEDURE — 82607 VITAMIN B-12: CPT | Performed by: INTERNAL MEDICINE

## 2023-03-28 PROCEDURE — 82043 UR ALBUMIN QUANTITATIVE: CPT | Performed by: INTERNAL MEDICINE

## 2023-03-28 PROCEDURE — 84403 ASSAY OF TOTAL TESTOSTERONE: CPT | Performed by: INTERNAL MEDICINE

## 2023-03-28 PROCEDURE — 84270 ASSAY OF SEX HORMONE GLOBUL: CPT | Performed by: INTERNAL MEDICINE

## 2023-03-28 PROCEDURE — 83970 ASSAY OF PARATHORMONE: CPT | Performed by: INTERNAL MEDICINE

## 2023-03-28 PROCEDURE — 83036 HEMOGLOBIN GLYCOSYLATED A1C: CPT | Performed by: INTERNAL MEDICINE

## 2023-03-28 PROCEDURE — 80048 BASIC METABOLIC PNL TOTAL CA: CPT | Performed by: INTERNAL MEDICINE

## 2023-03-28 PROCEDURE — 99214 OFFICE O/P EST MOD 30 MIN: CPT | Performed by: INTERNAL MEDICINE

## 2023-03-28 PROCEDURE — 80197 ASSAY OF TACROLIMUS: CPT | Performed by: INTERNAL MEDICINE

## 2023-03-28 PROCEDURE — 82570 ASSAY OF URINE CREATININE: CPT | Performed by: INTERNAL MEDICINE

## 2023-03-28 PROCEDURE — 36415 COLL VENOUS BLD VENIPUNCTURE: CPT | Performed by: INTERNAL MEDICINE

## 2023-03-28 RX ORDER — GABAPENTIN 300 MG/1
300 CAPSULE ORAL 2 TIMES DAILY
Qty: 180 CAPSULE | Refills: 3 | Status: SHIPPED | OUTPATIENT
Start: 2023-03-28 | End: 2023-01-01

## 2023-03-28 RX ORDER — ALBUTEROL SULFATE 90 UG/1
AEROSOL, METERED RESPIRATORY (INHALATION)
Qty: 18 G | Refills: 11 | Status: SHIPPED | OUTPATIENT
Start: 2023-03-28 | End: 2023-10-04

## 2023-03-28 RX ORDER — MONTELUKAST SODIUM 10 MG/1
TABLET ORAL
Qty: 90 TABLET | Refills: 3 | Status: SHIPPED | OUTPATIENT
Start: 2023-03-28 | End: 2024-01-01

## 2023-03-28 RX ORDER — ROSUVASTATIN CALCIUM 10 MG/1
10 TABLET, COATED ORAL DAILY
Qty: 90 TABLET | Refills: 3 | Status: SHIPPED | OUTPATIENT
Start: 2023-03-28 | End: 2023-01-01

## 2023-03-28 RX ORDER — ALLOPURINOL 300 MG/1
300 TABLET ORAL DAILY
Qty: 90 TABLET | Refills: 3 | Status: SHIPPED | OUTPATIENT
Start: 2023-03-28 | End: 2023-01-01

## 2023-03-28 RX ORDER — BUPROPION HYDROCHLORIDE 75 MG/1
TABLET ORAL
Qty: 180 TABLET | Refills: 3 | Status: SHIPPED | OUTPATIENT
Start: 2023-03-28 | End: 2024-01-01

## 2023-03-28 ASSESSMENT — PAIN SCALES - GENERAL: PAINLEVEL: NO PAIN (0)

## 2023-03-28 ASSESSMENT — PATIENT HEALTH QUESTIONNAIRE - PHQ9
SUM OF ALL RESPONSES TO PHQ QUESTIONS 1-9: 2
10. IF YOU CHECKED OFF ANY PROBLEMS, HOW DIFFICULT HAVE THESE PROBLEMS MADE IT FOR YOU TO DO YOUR WORK, TAKE CARE OF THINGS AT HOME, OR GET ALONG WITH OTHER PEOPLE: NOT DIFFICULT AT ALL
SUM OF ALL RESPONSES TO PHQ QUESTIONS 1-9: 2

## 2023-03-28 NOTE — PROGRESS NOTES
"  Assessment & Plan     1.  Generalized muscle weakness.  No obvious cause but patient concerned about low testosterone.  Indicates that a couple of decades ago he was treated for low testosterone.  We will proceed with checking testosterone level.  2.  Fatigue could be multifactorial but will check B12 level particular with history of Sylvester-en-Y gastric bypass.  3.  Vitamin B 12 deficiency in the past.  I will be checking vitamin B-12 level.  4.  Status post Orthotropic heart transplant doing well.  Patient on diet reaction medication.  5.  Chronic kidney disease stage IIIb.  Most recent creatinine 1.85 GFR 40 measured on 3/17/2023.  Relatively stable.  6.  Chronic gout due to renal impairment without tophi.  No recent flareup.  Treated with allopurinol 300 mg daily.  7.  Chronic obstructive lung disease.  Stable.  He uses albuterol on a as needed basis.  He stopped trilogy because of cost.  8.  Major depression recurrent chronic stable with bupropion 75 mg twice daily which she will continue.  9.  Hyperlipidemia with a goal to keep LDL below 70.  Currently on rosuvastatin 10 mg a day.  Cholesterol 136, HDL 71 LDL 49 measured on 9/15/2022.  He will continue same.  10.  History of type 2 diabetes mellitus resolved after weight loss.  Last hemoglobin A1c was 5.5 on 5/17/2022.  I will recheck it today.  11.  History of Sylvester-en-Y gastric bypass for obesity.  Weight is staying stable around 200 pounds  13.  Peripheral neuropathy related to previous diabetes.  Symptoms unchanged.  He continues to be on gabapentin and refill provided.  14.  Rotator cuff tear arthropathy of both shoulders with chronic pain.  Patient is on tramadol 20 mg total a day chronically for it.  15.  Bilateral carpal tunnel syndrome also has some Dupuytren contracture of the right hand.     BMI:   Estimated body mass index is 32.44 kg/m  as calculated from the following:    Height as of this encounter: 1.676 m (5' 6\").    Weight as of this encounter: " 91.2 kg (201 lb).         Ricardo Gómez MD  [facility] KOLTON Fernandez is a 67 year old, presenting for the following health issues:  Recheck Medication  No flowsheet data found.  History of Present Illness       Reason for visit:  Follow up    He eats 2-3 servings of fruits and vegetables daily.He consumes 0 sweetened beverage(s) daily.He exercises with enough effort to increase his heart rate 9 or less minutes per day.  He exercises with enough effort to increase his heart rate 3 or less days per week.   He is taking medications regularly.    Today's PHQ-9         PHQ-9 Total Score: 2    PHQ-9 Q9 Thoughts of better off dead/self-harm past 2 weeks :   Not at all    How difficult have these problems made it for you to do your work, take care of things at home, or get along with other people: Not difficult at all     Jim is a 67-year-old gentleman with s/p heart transplant who comes in for follow-up of multiple chronic health issues which include COPD, hyperlipidemia, neuropathy, chronic kidney disease, peripheral neuropathy, bilateral shoulder arthropathy with chronic pain.  He is complaining of a lot of muscle weakness and fatigue.  He would like to have his testosterone level checked.  He was on testosterone therapy 1 time a decade or 2 ago.  He has no libido but it does not seem to bother him.  He has been taking his medication as prescribed.  He still uses tramadol up to 200 mg a day for chronic pain particularly her shoulders.  He continues to gabapentin for neuropathy.  Has not had a recent gout flareup.  He currently on allopurinol 20 mg a day.  He feels his depression is under control with bupropion.  He had taken himself off of it for a short period and he felt down and depressed and restarted.  He is denying chest pain or shortness of breath.  Following gastric bypass his diabetes resolved.  His weight has been stable around 200 pounds.  Overall he feels good.    Review  "of Systems   Constitutional, HEENT, cardiovascular, pulmonary, GI, , musculoskeletal, neuro, skin, endocrine and psych systems are negative, except as otherwise noted.      Objective    /77   Pulse 98   Temp 98.4  F (36.9  C) (Oral)   Resp 26   Ht 1.676 m (5' 6\")   Wt 91.2 kg (201 lb)   SpO2 97%   BMI 32.44 kg/m    Body mass index is 32.44 kg/m .  Physical Exam   GENERAL: healthy, alert and no distress  NECK: no adenopathy, no asymmetry, masses, or scars and thyroid normal to palpation  RESP: lungs clear to auscultation - no rales, rhonchi or wheezes  CV: regular rate and rhythm, normal S1 S2, no S3 or S4, no murmur, click or rub, no peripheral edema and peripheral pulses strong  ABDOMEN: soft, nontender, no hepatosplenomegaly, no masses and bowel sounds normal  MS: no gross musculoskeletal defects noted, no edema                    "

## 2023-03-29 LAB — SHBG SERPL-SCNC: 70 NMOL/L (ref 11–80)

## 2023-03-30 LAB
TESTOST FREE SERPL-MCNC: 8.71 NG/DL
TESTOST SERPL-MCNC: 672 NG/DL (ref 240–950)

## 2023-04-03 DIAGNOSIS — Z94.1 S/P ORTHOTOPIC HEART TRANSPLANT (H): ICD-10-CM

## 2023-04-04 RX ORDER — MYCOPHENOLATE MOFETIL 250 MG/1
CAPSULE ORAL
Qty: 360 CAPSULE | Refills: 3 | Status: SHIPPED | OUTPATIENT
Start: 2023-04-04 | End: 2023-06-13

## 2023-04-24 ENCOUNTER — OFFICE VISIT (OUTPATIENT)
Dept: FAMILY MEDICINE | Facility: CLINIC | Age: 68
End: 2023-04-24
Payer: COMMERCIAL

## 2023-04-24 ENCOUNTER — NURSE TRIAGE (OUTPATIENT)
Dept: FAMILY MEDICINE | Facility: CLINIC | Age: 68
End: 2023-04-24

## 2023-04-24 VITALS
RESPIRATION RATE: 21 BRPM | SYSTOLIC BLOOD PRESSURE: 111 MMHG | HEIGHT: 69 IN | HEART RATE: 101 BPM | WEIGHT: 200 LBS | TEMPERATURE: 97.8 F | BODY MASS INDEX: 29.62 KG/M2 | DIASTOLIC BLOOD PRESSURE: 64 MMHG | OXYGEN SATURATION: 100 %

## 2023-04-24 DIAGNOSIS — Z94.1 HEART REPLACED BY TRANSPLANT (H): Primary | ICD-10-CM

## 2023-04-24 DIAGNOSIS — M79.89 LEFT ARM SWELLING: ICD-10-CM

## 2023-04-24 PROCEDURE — 99214 OFFICE O/P EST MOD 30 MIN: CPT | Performed by: INTERNAL MEDICINE

## 2023-04-24 ASSESSMENT — PAIN SCALES - GENERAL: PAINLEVEL: NO PAIN (0)

## 2023-04-24 NOTE — PROGRESS NOTES
"  Assessment & Plan     Left arm swelling  Noticed a swelling in the left upper extremity  He first noticed the swelling on Saturday  Swelling is more prominent when he flexes his forearm  Not painful  Freely mobile  On examination there is barely any swelling noticed when the arm is fully extended  However when he flexes his arm there is a freely mobile 3 cm x 3 cm swelling which appears to be subcutaneous  Most probably this could be lipoma  We will get ultrasound for further evaluation  - US [facility]; Future    Heart replaced by transplant (H)  He is on mycophenolate mofetil and tacrolimus  Follows up with cardiology        25 minutes spent by me on the date of the encounter doing chart review, history and exam, documentation and further activities per the note       BMI:   Estimated body mass index is 29.53 kg/m  as calculated from the following:    Height as of this encounter: 1.753 m (5' 9\").    Weight as of this encounter: 90.7 kg (200 lb).           Apollo Sullivan MD  [facility] KLOTON Fernandez is a 67 year old, presenting for the following health issues:  Follow Up        4/24/2023    10:20 AM   Additional Questions   Roomed by Jacquelin GENTILE   Accompanied by Self         4/24/2023    10:20 AM   Patient Reported Additional Medications   Patient reports taking the following new medications None     History of Present Illness       Reason for visit:  Large bump on l biscep  Symptom onset:  1-3 days ago  Symptoms include:  Bump  Symptom intensity:  Moderate    He eats 2-3 servings of fruits and vegetables daily.He consumes 1 sweetened beverage(s) daily.He exercises with enough effort to increase his heart rate 9 or less minutes per day.  He exercises with enough effort to increase his heart rate 3 or less days per week.   He is taking medications regularly.               Review of Systems   Constitutional, HEENT, cardiovascular, pulmonary, gi and gu systems are negative, except as " "otherwise noted.      Objective    /64 (BP Location: Right arm, Patient Position: Right side, Cuff Size: Adult Regular)   Pulse 101   Temp 97.8  F (36.6  C) (Oral)   Resp 21   Ht 1.753 m (5' 9\")   Wt 90.7 kg (200 lb)   SpO2 100%   BMI 29.53 kg/m    Body mass index is 29.53 kg/m .  Physical Exam   GENERAL: healthy, alert and no distress  EYES: Eyes grossly normal to inspection, PERRL and conjunctivae and sclerae normal  MS: Swelling of the left upper extremity  Swelling is more prominent when he flexes his upper arm and it disappears when he extends it  3 cm x 3 cm freely mobile swelling noted  Appears to be subcutaneous  NEURO: Normal strength and tone, mentation intact and speech normal  PSYCH: mentation appears normal, affect normal/bright                    "

## 2023-04-24 NOTE — TELEPHONE ENCOUNTER
"Patient calling in with concerns of new lesion-like area on outside of left bicep.    Patient states that he first noticed it this last Saturday when his granddaughter was asking him to flex his muscles and this \"growth-like area\" popped out of his arm. Patient states he can't see the lesion/growth when he isn't flexing, but he can still feel it. Patient states that it feels egg-shaped, but smaller than a golf ball. Patient states it is not hard, more malleable. Not painful, but is quite prominent when flexing bicep. Not present on other bicep. No discoloration or swelling present near area. Patient denies fever or any other symptoms. Patient concerned as he is 2 years post heart transplant and is immunocompromised (on tacro and mycophenolate).    As patient was having a harder time explaining the possible lesion and with patient's post solid organ tx, did get patient in with BA provider today for in-person appointment to be assessed. Patient aware of check in time, no further questions or concerns.      JOSEPH JoynerN, RN  [facility]      Reason for Disposition    Caller can't describe it clearly    Patient wants to be seen    Additional Information    Negative: Patient sounds very sick or weak to the triager    Negative: Fever and bump is tender to touch    Negative: Looks infected (e.g., spreading redness, red streak, pus)    Negative: Looks like a boil, infected sore, or deep ulcer    Answer Assessment - Initial Assessment Questions  1. APPEARANCE of LESION: \"What does it look like?\"       Growth on outside of left bicep  2. SIZE: \"How big is it?\" (e.g., inches, cm; or compare to size of pinhead, tip of pen, eraser, coin, pea, grape, ping pong ball)       Bit smaller than golf ball  3. COLOR: \"What color is it?\" \"Is there more than one color?\"      No color, under skin  4. SHAPE: \"What shape is it?\" (e.g., round, irregular)      Irregular, egg shaped  5. RAISED: \"Does it stick up " "above the skin or is it flat?\" (e.g., raised or elevated)      Sticks above the skin, especially when flexing bicep  6. TENDER: \"Does it hurt when you touch it?\"  (Scale 1-10; or mild, moderate, severe)      No  7. LOCATION: \"Where is it located?\"       Outside of left bicep  8. ONSET: \"When did it first appear?\"       First noticed it last Saturday  9. NUMBER: \"Is there just one?\" or \"Are there others?\"      Just one  10. CAUSE: \"What do you think it is?\"        Not sure, muscle injury or growth?  11. OTHER SYMPTOMS: \"Do you have any other symptoms?\" (e.g., fever)        No  12. PREGNANCY: \"Is there any chance you are pregnant?\" \"When was your last menstrual period?\"        N/A    Protocols used: SKIN LESION - MOLES OR GROWTHS-A-OH      "

## 2023-05-14 DIAGNOSIS — Z94.1 TRANSPLANTED HEART (H): ICD-10-CM

## 2023-05-14 DIAGNOSIS — Z94.1 S/P ORTHOTOPIC HEART TRANSPLANT (H): ICD-10-CM

## 2023-05-16 ENCOUNTER — TELEPHONE (OUTPATIENT)
Dept: TRANSPLANT | Facility: CLINIC | Age: 68
End: 2023-05-16

## 2023-05-16 RX ORDER — TACROLIMUS 0.5 MG/1
CAPSULE ORAL
Qty: 30 CAPSULE | Refills: 11 | Status: SHIPPED | OUTPATIENT
Start: 2023-05-16 | End: 2023-06-07

## 2023-05-16 RX ORDER — TACROLIMUS 1 MG/1
CAPSULE ORAL
Qty: 90 CAPSULE | Refills: 11 | Status: SHIPPED | OUTPATIENT
Start: 2023-05-16 | End: 2023-07-13

## 2023-05-16 NOTE — TELEPHONE ENCOUNTER
Pt has upcoming procedures and co-pays running $1,000 and at this time he will be unable to afford it  Needs to discuss

## 2023-05-17 ENCOUNTER — TELEPHONE (OUTPATIENT)
Dept: TRANSPLANT | Facility: CLINIC | Age: 68
End: 2023-05-17
Payer: COMMERCIAL

## 2023-05-17 NOTE — TELEPHONE ENCOUNTER
Pt states he can't afford to come to all visits. Pt is scheduled for 2nd annual and he says each individual visit is a $270 copay. He wants to cancel cardiac MRI, cxr, and clinic. He will still come to appointments on 5/23 (lab/cors/rhc/biopsy). Pt will schedule at a later date. Pt was also in touch with SW to discuss insurance issues.

## 2023-05-19 DIAGNOSIS — Z13.29 THYROID DISORDER SCREENING: ICD-10-CM

## 2023-05-19 DIAGNOSIS — Z13.220 LIPID SCREENING: ICD-10-CM

## 2023-05-19 DIAGNOSIS — Z94.1 TRANSPLANTED HEART (H): Primary | ICD-10-CM

## 2023-05-19 DIAGNOSIS — Z12.5 PROSTATE CANCER SCREENING: ICD-10-CM

## 2023-05-19 RX ORDER — SODIUM CHLORIDE 9 MG/ML
INJECTION, SOLUTION INTRAVENOUS CONTINUOUS
Status: CANCELLED | OUTPATIENT
Start: 2023-05-19

## 2023-05-19 RX ORDER — ASPIRIN 325 MG
325 TABLET ORAL ONCE
Status: CANCELLED | OUTPATIENT
Start: 2023-05-19 | End: 2023-05-19

## 2023-05-19 RX ORDER — ASPIRIN 81 MG/1
243 TABLET, CHEWABLE ORAL ONCE
Status: CANCELLED | OUTPATIENT
Start: 2023-05-19

## 2023-05-19 RX ORDER — LIDOCAINE 40 MG/G
CREAM TOPICAL
Status: CANCELLED | OUTPATIENT
Start: 2023-05-19

## 2023-05-23 ENCOUNTER — HOSPITAL ENCOUNTER (OUTPATIENT)
Facility: CLINIC | Age: 68
Discharge: HOME | End: 2023-05-23
Attending: INTERNAL MEDICINE | Admitting: INTERNAL MEDICINE
Payer: COMMERCIAL

## 2023-05-23 ENCOUNTER — APPOINTMENT (OUTPATIENT)
Dept: MEDSURG UNIT | Facility: CLINIC | Age: 68
End: 2023-05-23
Attending: INTERNAL MEDICINE
Payer: COMMERCIAL

## 2023-05-23 ENCOUNTER — APPOINTMENT (OUTPATIENT)
Dept: LAB | Facility: CLINIC | Age: 68
End: 2023-05-23
Attending: INTERNAL MEDICINE
Payer: COMMERCIAL

## 2023-05-23 VITALS
TEMPERATURE: 98 F | HEART RATE: 100 BPM | DIASTOLIC BLOOD PRESSURE: 98 MMHG | HEIGHT: 69 IN | SYSTOLIC BLOOD PRESSURE: 128 MMHG | BODY MASS INDEX: 29.84 KG/M2 | WEIGHT: 201.5 LBS | OXYGEN SATURATION: 100 % | RESPIRATION RATE: 16 BRPM

## 2023-05-23 DIAGNOSIS — Z12.5 PROSTATE CANCER SCREENING: ICD-10-CM

## 2023-05-23 DIAGNOSIS — Z13.220 LIPID SCREENING: ICD-10-CM

## 2023-05-23 DIAGNOSIS — Z13.29 THYROID DISORDER SCREENING: ICD-10-CM

## 2023-05-23 DIAGNOSIS — Z94.1 TRANSPLANTED HEART (H): ICD-10-CM

## 2023-05-23 LAB
ALBUMIN SERPL BCG-MCNC: 4.5 G/DL (ref 3.5–5.2)
ALP SERPL-CCNC: 162 U/L (ref 40–129)
ALT SERPL W P-5'-P-CCNC: 44 U/L (ref 10–50)
ANION GAP SERPL CALCULATED.3IONS-SCNC: 13 MMOL/L (ref 7–15)
AST SERPL W P-5'-P-CCNC: 36 U/L (ref 10–50)
BASOPHILS # BLD AUTO: 0 10E3/UL (ref 0–0.2)
BASOPHILS NFR BLD AUTO: 1 %
BILIRUB SERPL-MCNC: 0.5 MG/DL
BUN SERPL-MCNC: 36 MG/DL (ref 8–23)
CALCIUM SERPL-MCNC: 7.6 MG/DL (ref 8.8–10.2)
CHLORIDE SERPL-SCNC: 108 MMOL/L (ref 98–107)
CHOLEST SERPL-MCNC: 119 MG/DL
CK SERPL-CCNC: 281 U/L (ref 39–308)
CREAT SERPL-MCNC: 1.75 MG/DL (ref 0.67–1.17)
DEPRECATED HCO3 PLAS-SCNC: 18 MMOL/L (ref 22–29)
EOSINOPHIL # BLD AUTO: 0.1 10E3/UL (ref 0–0.7)
EOSINOPHIL NFR BLD AUTO: 3 %
ERYTHROCYTE [DISTWIDTH] IN BLOOD BY AUTOMATED COUNT: 13.4 % (ref 10–15)
GFR SERPL CREATININE-BSD FRML MDRD: 43 ML/MIN/1.73M2
GLUCOSE SERPL-MCNC: 89 MG/DL (ref 70–99)
HCT VFR BLD AUTO: 39 % (ref 40–53)
HDLC SERPL-MCNC: 73 MG/DL
HGB BLD-MCNC: 11.3 G/DL (ref 13.3–17.7)
HGB BLD-MCNC: 11.9 G/DL (ref 13.3–17.7)
IMM GRANULOCYTES # BLD: 0 10E3/UL
IMM GRANULOCYTES NFR BLD: 0 %
LDLC SERPL CALC-MCNC: 36 MG/DL
LYMPHOCYTES # BLD AUTO: 1.2 10E3/UL (ref 0.8–5.3)
LYMPHOCYTES NFR BLD AUTO: 30 %
MAGNESIUM SERPL-MCNC: 2.2 MG/DL (ref 1.7–2.3)
MCH RBC QN AUTO: 30.1 PG (ref 26.5–33)
MCHC RBC AUTO-ENTMCNC: 30.5 G/DL (ref 31.5–36.5)
MCV RBC AUTO: 99 FL (ref 78–100)
MONOCYTES # BLD AUTO: 0.4 10E3/UL (ref 0–1.3)
MONOCYTES NFR BLD AUTO: 10 %
NEUTROPHILS # BLD AUTO: 2.4 10E3/UL (ref 1.6–8.3)
NEUTROPHILS NFR BLD AUTO: 56 %
NONHDLC SERPL-MCNC: 46 MG/DL
NRBC # BLD AUTO: 0 10E3/UL
NRBC BLD AUTO-RTO: 0 /100
OXYHGB MFR BLDV: 73 % (ref 92–100)
PHOSPHATE SERPL-MCNC: 4 MG/DL (ref 2.5–4.5)
PLATELET # BLD AUTO: 209 10E3/UL (ref 150–450)
POTASSIUM SERPL-SCNC: 4.8 MMOL/L (ref 3.4–5.3)
PROT SERPL-MCNC: 6.6 G/DL (ref 6.4–8.3)
PSA SERPL DL<=0.01 NG/ML-MCNC: 0.36 NG/ML (ref 0–4.5)
RBC # BLD AUTO: 3.95 10E6/UL (ref 4.4–5.9)
SODIUM SERPL-SCNC: 139 MMOL/L (ref 136–145)
TACROLIMUS BLD-MCNC: 7.3 UG/L (ref 5–15)
TME LAST DOSE: NORMAL H
TME LAST DOSE: NORMAL H
TRIGL SERPL-MCNC: 49 MG/DL
TSH SERPL DL<=0.005 MIU/L-ACNC: 2.07 UIU/ML (ref 0.3–4.2)
WBC # BLD AUTO: 4.2 10E3/UL (ref 4–11)

## 2023-05-23 PROCEDURE — 250N000011 HC RX IP 250 OP 636: Performed by: INTERNAL MEDICINE

## 2023-05-23 PROCEDURE — C1751 CATH, INF, PER/CENT/MIDLINE: HCPCS | Performed by: INTERNAL MEDICINE

## 2023-05-23 PROCEDURE — 272N000001 HC OR GENERAL SUPPLY STERILE: Performed by: INTERNAL MEDICINE

## 2023-05-23 PROCEDURE — 80197 ASSAY OF TACROLIMUS: CPT | Performed by: INTERNAL MEDICINE

## 2023-05-23 PROCEDURE — 250N000013 HC RX MED GY IP 250 OP 250 PS 637: Performed by: INTERNAL MEDICINE

## 2023-05-23 PROCEDURE — 87799 DETECT AGENT NOS DNA QUANT: CPT | Performed by: INTERNAL MEDICINE

## 2023-05-23 PROCEDURE — 999N000248 HC STATISTIC IV INSERT WITH US BY RN

## 2023-05-23 PROCEDURE — 93454 CORONARY ARTERY ANGIO S&I: CPT | Performed by: INTERNAL MEDICINE

## 2023-05-23 PROCEDURE — 88350 IMFLUOR EA ADDL 1ANTB STN PX: CPT | Mod: 26 | Performed by: PATHOLOGY

## 2023-05-23 PROCEDURE — G0103 PSA SCREENING: HCPCS | Performed by: INTERNAL MEDICINE

## 2023-05-23 PROCEDURE — 86832 HLA CLASS I HIGH DEFIN QUAL: CPT | Performed by: INTERNAL MEDICINE

## 2023-05-23 PROCEDURE — 88307 TISSUE EXAM BY PATHOLOGIST: CPT | Mod: 26 | Performed by: PATHOLOGY

## 2023-05-23 PROCEDURE — 250N000009 HC RX 250: Performed by: INTERNAL MEDICINE

## 2023-05-23 PROCEDURE — 82810 BLOOD GASES O2 SAT ONLY: CPT

## 2023-05-23 PROCEDURE — 82550 ASSAY OF CK (CPK): CPT | Performed by: INTERNAL MEDICINE

## 2023-05-23 PROCEDURE — 80061 LIPID PANEL: CPT | Performed by: INTERNAL MEDICINE

## 2023-05-23 PROCEDURE — 999N000134 HC STATISTIC PP CARE STAGE 3

## 2023-05-23 PROCEDURE — 85018 HEMOGLOBIN: CPT

## 2023-05-23 PROCEDURE — C1894 INTRO/SHEATH, NON-LASER: HCPCS | Performed by: INTERNAL MEDICINE

## 2023-05-23 PROCEDURE — 99153 MOD SED SAME PHYS/QHP EA: CPT | Performed by: INTERNAL MEDICINE

## 2023-05-23 PROCEDURE — 80053 COMPREHEN METABOLIC PANEL: CPT | Performed by: INTERNAL MEDICINE

## 2023-05-23 PROCEDURE — 99152 MOD SED SAME PHYS/QHP 5/>YRS: CPT | Performed by: INTERNAL MEDICINE

## 2023-05-23 PROCEDURE — 36415 COLL VENOUS BLD VENIPUNCTURE: CPT | Performed by: INTERNAL MEDICINE

## 2023-05-23 PROCEDURE — 93454 CORONARY ARTERY ANGIO S&I: CPT | Mod: 26 | Performed by: INTERNAL MEDICINE

## 2023-05-23 PROCEDURE — 258N000003 HC RX IP 258 OP 636: Performed by: INTERNAL MEDICINE

## 2023-05-23 PROCEDURE — 93456 R HRT CORONARY ARTERY ANGIO: CPT | Performed by: INTERNAL MEDICINE

## 2023-05-23 PROCEDURE — 84100 ASSAY OF PHOSPHORUS: CPT | Performed by: INTERNAL MEDICINE

## 2023-05-23 PROCEDURE — 88307 TISSUE EXAM BY PATHOLOGIST: CPT | Mod: TC | Performed by: INTERNAL MEDICINE

## 2023-05-23 PROCEDURE — 86833 HLA CLASS II HIGH DEFIN QUAL: CPT | Performed by: INTERNAL MEDICINE

## 2023-05-23 PROCEDURE — 999N000142 HC STATISTIC PROCEDURE PREP ONLY

## 2023-05-23 PROCEDURE — 88350 IMFLUOR EA ADDL 1ANTB STN PX: CPT | Mod: TC | Performed by: INTERNAL MEDICINE

## 2023-05-23 PROCEDURE — C1887 CATHETER, GUIDING: HCPCS | Performed by: INTERNAL MEDICINE

## 2023-05-23 PROCEDURE — 86352 CELL FUNCTION ASSAY W/STIM: CPT | Performed by: INTERNAL MEDICINE

## 2023-05-23 PROCEDURE — 999N000054 HC STATISTIC EKG NON-CHARGEABLE

## 2023-05-23 PROCEDURE — 84443 ASSAY THYROID STIM HORMONE: CPT | Performed by: INTERNAL MEDICINE

## 2023-05-23 PROCEDURE — 99152 MOD SED SAME PHYS/QHP 5/>YRS: CPT | Mod: GC | Performed by: INTERNAL MEDICINE

## 2023-05-23 PROCEDURE — 83735 ASSAY OF MAGNESIUM: CPT | Performed by: INTERNAL MEDICINE

## 2023-05-23 PROCEDURE — 93005 ELECTROCARDIOGRAM TRACING: CPT

## 2023-05-23 PROCEDURE — 85025 COMPLETE CBC W/AUTO DIFF WBC: CPT | Performed by: INTERNAL MEDICINE

## 2023-05-23 PROCEDURE — 93505 ENDOMYOCARDIAL BIOPSY: CPT | Mod: 26 | Performed by: INTERNAL MEDICINE

## 2023-05-23 PROCEDURE — 88346 IMFLUOR 1ST 1ANTB STAIN PX: CPT | Mod: 26 | Performed by: PATHOLOGY

## 2023-05-23 PROCEDURE — C1769 GUIDE WIRE: HCPCS | Performed by: INTERNAL MEDICINE

## 2023-05-23 PROCEDURE — 93505 ENDOMYOCARDIAL BIOPSY: CPT | Performed by: INTERNAL MEDICINE

## 2023-05-23 RX ORDER — SODIUM CHLORIDE 9 MG/ML
INJECTION, SOLUTION INTRAVENOUS CONTINUOUS
Status: DISCONTINUED | OUTPATIENT
Start: 2023-05-23 | End: 2023-05-23 | Stop reason: HOSPADM

## 2023-05-23 RX ORDER — ATROPINE SULFATE 0.1 MG/ML
0.5 INJECTION INTRAVENOUS
Status: DISCONTINUED | OUTPATIENT
Start: 2023-05-23 | End: 2023-05-23 | Stop reason: HOSPADM

## 2023-05-23 RX ORDER — ASPIRIN 81 MG/1
243 TABLET, CHEWABLE ORAL ONCE
Status: COMPLETED | OUTPATIENT
Start: 2023-05-23 | End: 2023-05-23

## 2023-05-23 RX ORDER — OXYCODONE HYDROCHLORIDE 5 MG/1
5 TABLET ORAL EVERY 4 HOURS PRN
Status: DISCONTINUED | OUTPATIENT
Start: 2023-05-23 | End: 2023-05-23 | Stop reason: HOSPADM

## 2023-05-23 RX ORDER — NALOXONE HYDROCHLORIDE 0.4 MG/ML
0.2 INJECTION, SOLUTION INTRAMUSCULAR; INTRAVENOUS; SUBCUTANEOUS
Status: DISCONTINUED | OUTPATIENT
Start: 2023-05-23 | End: 2023-05-23 | Stop reason: HOSPADM

## 2023-05-23 RX ORDER — NITROGLYCERIN 5 MG/ML
VIAL (ML) INTRAVENOUS
Status: DISCONTINUED | OUTPATIENT
Start: 2023-05-23 | End: 2023-05-23 | Stop reason: HOSPADM

## 2023-05-23 RX ORDER — NICARDIPINE HYDROCHLORIDE 2.5 MG/ML
INJECTION INTRAVENOUS
Status: DISCONTINUED | OUTPATIENT
Start: 2023-05-23 | End: 2023-05-23 | Stop reason: HOSPADM

## 2023-05-23 RX ORDER — HEPARIN SODIUM 1000 [USP'U]/ML
INJECTION, SOLUTION INTRAVENOUS; SUBCUTANEOUS
Status: DISCONTINUED | OUTPATIENT
Start: 2023-05-23 | End: 2023-05-23 | Stop reason: HOSPADM

## 2023-05-23 RX ORDER — LIDOCAINE 40 MG/G
CREAM TOPICAL
Status: DISCONTINUED | OUTPATIENT
Start: 2023-05-23 | End: 2023-05-23 | Stop reason: HOSPADM

## 2023-05-23 RX ORDER — OXYCODONE HYDROCHLORIDE 5 MG/1
10 TABLET ORAL EVERY 4 HOURS PRN
Status: DISCONTINUED | OUTPATIENT
Start: 2023-05-23 | End: 2023-05-23 | Stop reason: HOSPADM

## 2023-05-23 RX ORDER — IOPAMIDOL 755 MG/ML
INJECTION, SOLUTION INTRAVASCULAR
Status: DISCONTINUED | OUTPATIENT
Start: 2023-05-23 | End: 2023-05-23 | Stop reason: HOSPADM

## 2023-05-23 RX ORDER — FENTANYL CITRATE 50 UG/ML
INJECTION, SOLUTION INTRAMUSCULAR; INTRAVENOUS
Status: DISCONTINUED | OUTPATIENT
Start: 2023-05-23 | End: 2023-05-23 | Stop reason: HOSPADM

## 2023-05-23 RX ORDER — NALOXONE HYDROCHLORIDE 0.4 MG/ML
0.4 INJECTION, SOLUTION INTRAMUSCULAR; INTRAVENOUS; SUBCUTANEOUS
Status: DISCONTINUED | OUTPATIENT
Start: 2023-05-23 | End: 2023-05-23 | Stop reason: HOSPADM

## 2023-05-23 RX ORDER — FENTANYL CITRATE 50 UG/ML
25 INJECTION, SOLUTION INTRAMUSCULAR; INTRAVENOUS
Status: DISCONTINUED | OUTPATIENT
Start: 2023-05-23 | End: 2023-05-23 | Stop reason: HOSPADM

## 2023-05-23 RX ORDER — ASPIRIN 325 MG
325 TABLET ORAL ONCE
Status: COMPLETED | OUTPATIENT
Start: 2023-05-23 | End: 2023-05-23

## 2023-05-23 RX ORDER — FLUMAZENIL 0.1 MG/ML
0.2 INJECTION, SOLUTION INTRAVENOUS
Status: DISCONTINUED | OUTPATIENT
Start: 2023-05-23 | End: 2023-05-23 | Stop reason: HOSPADM

## 2023-05-23 RX ADMIN — SODIUM CHLORIDE: 9 INJECTION, SOLUTION INTRAVENOUS at 09:38

## 2023-05-23 RX ADMIN — LIDOCAINE: 40 CREAM TOPICAL at 09:47

## 2023-05-23 RX ADMIN — ASPIRIN 325 MG ORAL TABLET 325 MG: 325 PILL ORAL at 09:39

## 2023-05-23 ASSESSMENT — ACTIVITIES OF DAILY LIVING (ADL)
ADLS_ACUITY_SCORE: 35

## 2023-05-23 NOTE — Clinical Note
Potential access sites were evaluated for patency using ultrasound.   The right internal jugular vein was selected. Access was obtained under with Sonosite and Fluoroscopic guidance using a standard 18 guage needle with direct visualization of needle entry.

## 2023-05-23 NOTE — PROGRESS NOTES
Pre procedure complete. VSS, pt denies pain and is appropriately NPO. Canelo groin prepped. Pedal pulses marked. Cate (wife) will transport home post procedure.

## 2023-05-23 NOTE — Clinical Note
dry, intact, no bleeding and no hematoma. 7fr sheath removed from RIJ. MP held hemostasis obtained band aid applied  6fr sheath removed from R rad artery. TR band applied see flowsheet

## 2023-05-23 NOTE — PROGRESS NOTES
D/I/A:  Patient is tolerating liquids and foods, ambulating, urinating, puncture sites are stable (no bleeding and no hematoma) and patient has a .  A+O x4 and making needs known.  CCL access sites C/D/I; no bleeding or hematoma; CMS intact.  VSSA.  SR/ST (baseline) on monitor.  IV access removed.  Education completed and outlined in AVS or handout: medications reviewed with patient.  Questions answered prior to discharge.  Belongings returned to patient at discharge.    P: Discharged to self care.  Patient to follow up with appts as per discharge instruction.

## 2023-05-23 NOTE — DISCHARGE INSTRUCTIONS
Going Home after an Angioplasty or Stent Placement (Cardiac)  ______________________________________________      After you go home:  Have an adult stay with you for 24 hours.  Drink plenty of fluids.  You may eat your normal diet, unless your doctor tells you otherwise.  For 24 hours:  Relax and take it easy.  Do NOT smoke.  Do NOT make any important or legal decisions.  Do NOT drive or operate machines at home or at work.  Do NOT drink alcohol.  Remove the Band-Aid after 24 hours. If there is minor oozing, apply another Band-aid and remove it after 12 hours.  For 2 days, do NOT have sex or do any heavy exercise.  Do NOT take a bath, or use a hot tub or pool for at least 3 days. You may shower.    Care of neck site  Do not scrub the procedure site vigorously  No lotion or powder to the puncture site for 3 days  Keep dressing in place for 24 hours    Care of wrist or arm site  It is normal to have soreness at the puncture site and mild tingling in your hand for up to 3 days.  For 2 days, do not use your hand or arm to support your weight (such as rising from a chair) or bend your wrist (such as lifting a garage door).  For 2 days, do not lift more than 5 pounds or exercise your arm (tennis, golf or bowling).    If you start bleeding from the site in your arm:  Sit down and press firmly on the site with your fingers for 10 minutes. Call your doctor as soon as you can.  If the bleeding stops, sit still and keep your wrist straight for 2 hours.    Medicines  If you have started taking Plavix or Effient, do not stop taking it until you talk to your heart doctor (cardiologist).  If you are on metformin (Glucophage), do not restart it until you have blood tests (within 2 to 3 days after discharge). When your doctor tells you it is safe, you may restart the metformin.  If you have stopped any other medicines, check with your nurse or provider about when to restart them.    Call 911 right away if you have bleeding that is  heavy or does not stop.    Call your doctor if:  You have a large or growing hard lump around the site.  The site is red, swollen, hot or tender.  Blood or fluid is draining from the site.  You have chills or a fever greater than 101 F (38 C).  Your leg or arm feels numb or cool.  You have hives, a rash or unusual itching.      [facility] [place]:  [phone] (7 days a week)

## 2023-05-24 LAB
ATRIAL RATE - MUSE: 94 BPM
CMV DNA SPEC NAA+PROBE-ACNC: NOT DETECTED IU/ML
DIASTOLIC BLOOD PRESSURE - MUSE: NORMAL MMHG
EBV DNA COPIES/ML, INSTRUMENT: 5913 COPIES/ML
EBV DNA SPEC NAA+PROBE-LOG#: 3.8 {LOG_COPIES}/ML
INTERPRETATION ECG - MUSE: NORMAL
P AXIS - MUSE: 31 DEGREES
PATH REPORT.COMMENTS IMP SPEC: NORMAL
PATH REPORT.COMMENTS IMP SPEC: NORMAL
PATH REPORT.FINAL DX SPEC: NORMAL
PATH REPORT.GROSS SPEC: NORMAL
PATH REPORT.MICROSCOPIC SPEC OTHER STN: NORMAL
PATH REPORT.RELEVANT HX SPEC: NORMAL
PHOTO IMAGE: NORMAL
PR INTERVAL - MUSE: 166 MS
QRS DURATION - MUSE: 94 MS
QT - MUSE: 368 MS
QTC - MUSE: 460 MS
R AXIS - MUSE: 40 DEGREES
SYSTOLIC BLOOD PRESSURE - MUSE: NORMAL MMHG
T AXIS - MUSE: 49 DEGREES
VENTRICULAR RATE- MUSE: 94 BPM

## 2023-05-25 LAB
ALLOMAP SCORE (EXTERNAL): 32 (ref 0–40)
DONOR IDENTIFICATION: NORMAL
DSA COMMENTS: NORMAL
DSA PRESENT: NO
DSA TEST METHOD: NORMAL
IMMUKNOW IMMUNE CELL FUNCTION: 200 NG/ML
NEGATIVE PREDICTIVE VALUE PERCENT (EXTERNAL): 99 %
ORGAN: NORMAL
POSITIVE PREDICTIVE VALUE PERCENT (EXTERNAL): 2.9 %
SA 1 CELL: NORMAL
SA 1 TEST METHOD: NORMAL
SA 2 CELL: NORMAL
SA 2 TEST METHOD: NORMAL
SA1 HI RISK ABY: NORMAL
SA1 MOD RISK ABY: NORMAL
SA2 HI RISK ABY: NORMAL
SA2 MOD RISK ABY: NORMAL
UNACCEPTABLE ANTIGENS: NORMAL
UNOS CPRA: 0
ZZZSA 1  COMMENTS: NORMAL
ZZZSA 2 COMMENTS: NORMAL

## 2023-06-06 NOTE — NURSING NOTE
Transplant Coordinator Note    Reason for visit: 2nd annual testing on 5/23. Cardiac MRI 8/21.   Coordinator: Present       HAPPY BIRTHDAY!!      Health concerns addressed today:  1. 20% RCA lesion  2. PCWP 15, PA 26 RA 8  3. No DSAS  4. Immuknow = 200  5. 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 Cr = 1.7 -> Referral for Nephrology but patient never showed. 11/2021    Immunosuppressants:  Tacrolimus, goal 4-6, currently taking 4.5 mg in the AM and 4 mg in the PM, Current level 7.3.    mg twice daily.     Routine screenings:     Derm:  last 5/25/22   Dental: Due   Colonoscopy: DUE  8/2021 (last 2016)  Prostate: 0.35   Eye: Due   Flu/Pneumonia: up to date    Covid: 2/3, 3/11.  4th on 6/8/22. 9/22  Shingles: Due ?  Evusheld: 6/2022         Testing and resulted labs reviewed with patient  Medication record reviewed and reconciled  Questions and concerns addressed  Pt verbalized an understanding of plan of care.     Patient Instructions  1. HAPPY BIRTHDAY!!  2. Please follow up with routine screenings, due for dermatology and eye doctor.   3. Please get a  shingrix vaccine at any pharmacy  (2 shot series) .   4. Please get covid booster. 2 weeks apart from any other vaccines.   5. Due for colonoscopy   6. Stop in [place] on your way out to see if you can do CXR today, then we'll cancel on 6/12.   7. Decrease tacro to 4 mg twice a day. Repeat in 1 week.     Next transplant clinic appointment: we will schedule your 30 month appointments.   Next lab draw: Next appointments.   Coordinator will call with all pending results.     Please call transplant coordinator with any questions:    Yolette Rueda RN BSN   Post Heart Transplant Nurse Coordinator  [facility]  Questions: [phone]

## 2023-06-07 ENCOUNTER — ANCILLARY PROCEDURE (OUTPATIENT)
Dept: GENERAL RADIOLOGY | Facility: CLINIC | Age: 68
End: 2023-06-07
Attending: INTERNAL MEDICINE
Payer: COMMERCIAL

## 2023-06-07 ENCOUNTER — OFFICE VISIT (OUTPATIENT)
Dept: CARDIOLOGY | Facility: CLINIC | Age: 68
End: 2023-06-07
Attending: NURSE PRACTITIONER

## 2023-06-07 VITALS
HEIGHT: 69 IN | OXYGEN SATURATION: 96 % | HEART RATE: 98 BPM | WEIGHT: 197.7 LBS | DIASTOLIC BLOOD PRESSURE: 89 MMHG | SYSTOLIC BLOOD PRESSURE: 136 MMHG | BODY MASS INDEX: 29.28 KG/M2

## 2023-06-07 DIAGNOSIS — Z94.1 TRANSPLANTED HEART (H): ICD-10-CM

## 2023-06-07 DIAGNOSIS — Z94.1 TRANSPLANTED HEART (H): Primary | ICD-10-CM

## 2023-06-07 PROCEDURE — G0463 HOSPITAL OUTPT CLINIC VISIT: HCPCS | Performed by: NURSE PRACTITIONER

## 2023-06-07 PROCEDURE — 71046 X-RAY EXAM CHEST 2 VIEWS: CPT | Performed by: RADIOLOGY

## 2023-06-07 PROCEDURE — 99215 OFFICE O/P EST HI 40 MIN: CPT | Performed by: NURSE PRACTITIONER

## 2023-06-07 RX ORDER — IPRATROPIUM BROMIDE AND ALBUTEROL SULFATE 2.5; .5 MG/3ML; MG/3ML
SOLUTION RESPIRATORY (INHALATION)
COMMUNITY
End: 2023-09-17

## 2023-06-07 RX ORDER — DIPHENHYDRAMINE HCL 25 MG
25 TABLET ORAL EVERY 6 HOURS PRN
COMMUNITY

## 2023-06-07 ASSESSMENT — PAIN SCALES - GENERAL: PAINLEVEL: NO PAIN (0)

## 2023-06-07 NOTE — PROGRESS NOTES
ADULT HEART TRANSPLANT CLINIC  June 7, 2023    HPI:   Mr. Jim Willingham is a 68yr old male with a history of NICM (s/p HM2 LVAD 6/2017) s/p OHT 5/6/21, VT, AFib, CKD, DMII, and COPD who presents to clinic for routine surveillance.     His post-transplant course was c/b right pleural air leak (required prolonged chest tube), pAFib, CAP (RLL, 6/2021), recurrent pleural effusions (s/p thoracenteses x2 6/2021 and 7/2021, exudative, repeatedly cultured negative), RLL cavitary lesions (per chest CT 7/14/21, BD glucan indeterminate, aspergillus negative, not started on antifungals), pericardial effusion (1.4cm per TTE 7/12/21, conservatively managed), low-grade EBV viremia, recurrent/persistent gout flare, transaminase elevation with questionable choledocholithiasis (conservatively managed with resolution), COVID-19 (8/2021, s/p monoclonal antibodies and remdesivir x5 days), and 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     Rejection history:  none  AlloMap scores:  < 35  DSAs:  none  Coronary angio/Ischemic eval:  Coronary angio 5/2022 showed minimal CAD (20% stenosis in RCA), and was negative for obstruction.  Last RHC:  5/2022 showed mildly normal biventricular filling pressures with RA 5, mPA 22, PCW 9, and CI 2.9.  Echo/cMRI:  TTE 1/2023 showed stable graft function, with LVEF 60-65% and normal RV size/function.     Since his last visit, he states that he has been doing well.  He remains active, with no exertional concerns.  His breathing has been well.  He has had allergies, and has noted mild SOB for which he takes his inhaler as needed.  Otherwise, breathing remains normal, and he denies PND and orthopnea.  His appetite has been well, and he is eating regularly without nausea, vomiting, diarrhea, and constipation.  His weight has been stable, ranging 195-200#, and he denies fluid retention.  His BPs have been stable, ranging 100-110/60s at home.  He otherwise denies chest pain,  palpitations, dizziness, new headaches, acute vision changes, fevers, chills, cough, sore throat, and signs of bleeding.       Serostatus:  CMV D+/R+  EBV D+/R+  Toxo D-/R-    Patient Active Problem List    Diagnosis Date Noted     Transplanted heart (H) 05/23/2023     Priority: Medium     Thyroid disorder screening 05/23/2023     Priority: Medium     Lipid screening 05/23/2023     Priority: Medium     Prostate cancer screening 05/23/2023     Priority: Medium     Peripheral polyneuropathy 03/28/2023     Priority: Medium     History of type 2 diabetes mellitus 03/28/2023     Priority: Medium     Hyperlipidemia LDL goal <70 09/27/2022     Priority: Medium     Status post coronary angiogram 05/17/2022     Priority: Medium     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     Priority: Medium     Mild anemia 03/18/2022     Priority: Medium     Hx of aspergillosis 12/16/2021     Priority: Medium     Encounter for monitoring tacrolimus therapy 12/11/2021     Priority: Medium     Pulmonary cavitary lesion 11/18/2021     Priority: Medium     Generalized muscle weakness 07/08/2021     Priority: Medium     Abnormal CT scan of lung 05/25/2021     Priority: Medium     Need for prophylactic antibiotic 05/12/2021     Priority: Medium     S/P orthotopic heart transplant (H) 02/05/2021     Priority: Medium     Added automatically from request for surgery [id number]       Bilateral carpal tunnel syndrome 11/02/2020     Priority: Medium     Rotator cuff tear arthropathy of both shoulders 11/02/2020     Priority: Medium     COPD (chronic obstructive pulmonary disease) (H) 11/02/2020     Priority: Medium     Major depression, recurrent, chronic (H) 11/02/2020     Priority: Medium     Gouty arthropathy, chronic, without tophi 11/02/2020     Priority: Medium     Iron deficiency anemia 07/31/2018     Priority: Medium     Stage 3b chronic kidney disease (H) 05/11/2017     Priority: Medium     H/O gastric bypass 05/11/2017     Priority: Medium      Vitamin B12 deficiency (non anemic) 05/11/2017     Priority: Medium       PAST MEDICAL HISTORY:  Past Medical History:   Diagnosis Date     Bariatric surgery status 2003     Benign essential hypertension 05/11/2017     Bilateral carpal tunnel syndrome 11/02/2020     Cardiomyopathy, unspecified (H) 05/08/2017     CKD (chronic kidney disease) stage 3, GFR 30-59 ml/min (H) 05/11/2017     COPD (chronic obstructive pulmonary disease) (H) 11/02/2020     Depression 05/11/2017     Diabetes mellitus (H) 1995     Leigh-Barr virus viremia 3/18/2022     Gouty arthropathy, chronic, without tophi 11/02/2020     H/O gastric bypass 05/11/2017     History of type 2 diabetes mellitus 3/28/2023     Hyperlipidemia LDL goal <70 9/27/2022     ICD (implantable cardioverter-defibrillator), biventricular, in situ 05/11/2017     LVAD (left ventricular assist device) present (H)      Major depression, recurrent, chronic (H) 11/02/2020     Mild anemia 3/18/2022     NICM (nonischemic cardiomyopathy) (H)/ EF 20% 05/11/2017    ECHO: LVEDd. 7.66 cm, Restrictive pattern , Severe mitral valve regurgitation     CECILIA (obstructive sleep apnea) 05/11/2017     Paroxysmal atrial fibrillation (H) 05/11/2017     Paroxysmal VT (H) 05/11/2017     Peripheral polyneuropathy 3/28/2023     Pulmonary cavitary lesion 11/18/2021     Rotator cuff tear arthropathy of both shoulders 11/02/2020     Type 2 diabetes mellitus with diabetic polyneuropathy (H) 3/18/2022     Uncomplicated asthma      Vitamin B12 deficiency (non anemic) 05/11/2017       CURRENT MEDICATIONS:  Prescription Medications as of 6/7/2023       Rx Number Disp Refills Start End Last Dispensed Date Next Fill Date Owning Pharmacy    acetaminophen (TYLENOL) 325 MG tablet  100 tablet 1 7/5/2021    [facility] - [place], [place] - [address]    Sig: Take 3 tablets (975 mg) by mouth every 6 hours as needed for other (For optimal non-opioid multimodal pain management to improve  pain control.)    Class: E-Prescribe    Route: Oral    albuterol (VENTOLIN HFA) 108 (90 Base) MCG/ACT inhaler  18 g 11 3/28/2023    [facility] [address] [place] [address]    Sig: INHALE 2 PUFFS BY MOUTH EVERY 4 HOURS AS NEEDED. MAX OF 12 PUFFS PER 24 HOURS    Class: E-Prescribe    Notes to Pharmacy: Pharmacy may dispense brand covered by insurance (Proair, or proventil or ventolin or generic albuterol inhaler)    allopurinol (ZYLOPRIM) 300 MG tablet  90 tablet 3 3/28/2023    [facility] [address] [place] [address]    Sig: Take 1 tablet (300 mg) by mouth daily    Class: E-Prescribe    Route: Oral    anakinra (KINERET) 100 MG/0.67ML SOSY injection  20.1 mL 6 1/10/2022 2/9/2022   [facility] - [address]    Sig: Inject 0.67 mLs (100 mg) Subcutaneous daily As directed by rheumatology    Class: E-Prescribe    Route: Subcutaneous    aspirin (ASA) 81 MG chewable tablet  100 tablet 1 6/1/2021    [place], [place] - [address] [place] [address]    Sig: Take 1 tablet (81 mg) by mouth daily    Class: E-Prescribe    Route: Oral    buPROPion (WELLBUTRIN) 75 MG tablet  180 tablet 3 3/28/2023    [facility] [address] [place] [address]    Sig: TAKE 1 TABLET(75 MG) BY MOUTH TWICE DAILY    Class: E-Prescribe    calcium citrate-vitamin D (CITRACAL) 315-250 MG-UNIT TABS per tablet  180 tablet 3 11/18/2021    [id number] [address] [facility] [address]    Sig: Take 1 tablet by mouth 2 times daily    Class: E-Prescribe    Route: Oral    docusate sodium (COLACE) 100 MG capsule  60 capsule 3 12/17/2021    [place], [place] - [address] [place] [address]    Sig: Take 1 capsule (100 mg) by mouth 2 times daily    Class: E-Prescribe    Route: Oral    Fluticasone-Umeclidin-Vilanterol (TRELEGY ELLIPTA) 100-62.5-25 MCG/INH oral  inhaler  1 each 11 9/27/2022    [facility] [address] [place] [address], [address]    Sig: Inhale 1 puff into the lungs daily    Class: E-Prescribe    Route: Inhalation    gabapentin (NEURONTIN) 300 MG capsule  180 capsule 3 3/28/2023    [facility] [address] [place] [address], [address]    Sig: Take 1 capsule (300 mg) by mouth 2 times daily    Class: E-Prescribe    Route: Oral    Melatonin 10 MG CAPS            Sig: Take 1 capsule by mouth At Bedtime    Class: Historical    Route: Oral    montelukast (SINGULAIR) 10 MG tablet  90 tablet 3 3/28/2023    [facility] [address] [place] [address], [address]    Sig: TAKE 1 TABLET(10 MG) BY MOUTH AT BEDTIME    Class: E-Prescribe    mycophenolate (GENERIC EQUIVALENT) 250 MG capsule  360 capsule 3 4/4/2023    [facility] [address] [place] [address], [address]    Sig: TAKE TWO CAPSULES BY MOUTH TWICE A DAY    Class: E-Prescribe    rosuvastatin (CRESTOR) 10 MG tablet  90 tablet 3 3/28/2023    [facility] [address] [place] [address]    Sig: Take 1 tablet (10 mg) by mouth daily    Class: E-Prescribe    Route: Oral    tacrolimus (GENERIC EQUIVALENT) 0.5 MG capsule  30 capsule 11 5/16/2023    [facility] [address] [place] [address]    Sig: TAKE ONE CAPSULE BY MOUTH EVERY MORNING    Class: E-Prescribe    tacrolimus (GENERIC EQUIVALENT) 1 MG capsule  90 capsule 11 5/16/2023    [facility] [address] [place] [address]    Sig: TAKE FOUR CAPSULES BY MOUTH IN THE MORNING AND FOUR CAPSULES BY MOUTH IN THE EVENING    Class: E-Prescribe    traMADol (ULTRAM) 50 MG tablet  120 tablet 0 3/20/2023    [facility] [address] [place] [address]    Sig: TAKE ONE TABLET BY MOUTH EVERY MORNING AND IN THE AFTERNOON AND TAKE TWO TABLETS BY MOUTH AT BEDTIME    Class: E-Prescribe          ROS:   Answers for HPI/ROS submitted by the  "patient on 6/5/2023  General Symptoms: No  Skin Symptoms: No  HENT Symptoms: No  EYE SYMPTOMS: No  HEART SYMPTOMS: No  LUNG SYMPTOMS: No  INTESTINAL SYMPTOMS: No  URINARY SYMPTOMS: No  REPRODUCTIVE SYMPTOMS: No  SKELETAL SYMPTOMS: No  BLOOD SYMPTOMS: No  NERVOUS SYSTEM SYMPTOMS: No  MENTAL HEALTH SYMPTOMS: No    Exam:  /89 (BP Location: Right arm, Patient Position: Sitting, Cuff Size: Adult Regular)   Pulse 98   Ht 1.753 m (5' 9.02\")   Wt 89.7 kg (197 lb 11.2 oz)   SpO2 96%   BMI 29.18 kg/m    In general, the patient is a pleasant male in no apparent distress.    HEENT: NC/AT. PERRLA. EOMI.  Sclerae white, not injected.    Neck:  No adenopathy, No thyromegaly.    COR: No jugular venous distention when sitting upright.  RRR.  Normal S1 S2 splits physiologically.  No murmur, rub click, or gallop.    Lungs:  CTA. No rhonchi.    Abdomen: soft, nontender, nondistended.  No organomegaly.  Extremities:  No clubbing, cyanosis, or LE edema.    Neuro: Alert & Oriented x 3, grossly non focal.  Integument: no open lesions, rashes, or jaundice.    Labs:  CBC RESULTS:   Lab Results   Component Value Date    WBC 4.2 05/23/2023    WBC 7.8 07/11/2021    RBC 3.95 (L) 05/23/2023    RBC 3.06 (L) 07/11/2021    HGB 11.3 (L) 05/23/2023    HGB 11.9 (L) 05/23/2023    HGB 8.7 (L) 07/11/2021    HCT 39.0 (L) 05/23/2023    HCT 28.4 (L) 07/11/2021    MCV 99 05/23/2023    MCV 93 07/11/2021    MCH 30.1 05/23/2023    MCH 28.4 07/11/2021    MCHC 30.5 (L) 05/23/2023    MCHC 30.6 (L) 07/11/2021    RDW 13.4 05/23/2023    RDW 15.6 (H) 07/11/2021     05/23/2023     07/11/2021       BMP RESULTS:  Lab Results   Component Value Date     05/23/2023     (L) 07/11/2021    POTASSIUM 4.8 05/23/2023    POTASSIUM 4.3 03/28/2023    POTASSIUM 5.1 07/11/2021    CHLORIDE 108 (H) 05/23/2023    CHLORIDE 111 (H) 03/28/2023    CHLORIDE 104 07/11/2021    CO2 18 (L) 05/23/2023    CO2 23 03/28/2023    CO2 23 07/11/2021    ANIONGAP 13 " 05/23/2023    ANIONGAP 4 03/28/2023    ANIONGAP 6 07/11/2021    GLC 89 05/23/2023     (H) 03/28/2023     (H) 07/11/2021    BUN 36.0 (H) 05/23/2023    BUN 25 03/28/2023    BUN 49 (H) 07/11/2021    CR 1.75 (H) 05/23/2023    CR 2.75 (H) 07/11/2021    GFRESTIMATED 43 (L) 05/23/2023    GFRESTIMATED 42 (L) 12/15/2021    GFRESTIMATED 23 (L) 07/11/2021    GFRESTBLACK 27 (L) 07/11/2021    QAMAR 7.6 (L) 05/23/2023    QAMAR 7.9 (L) 07/11/2021      LIPID RESULTS:  Lab Results   Component Value Date    CHOL 119 05/23/2023    CHOL 209 (H) 11/05/2020    HDL 73 05/23/2023    HDL 87 11/05/2020    LDL 36 05/23/2023     (H) 11/05/2020    TRIG 49 05/23/2023    TRIG 91 11/05/2020    NHDL 46 05/23/2023    NHDL 122 11/05/2020       IMMUNOSUPPRESSANT LEVELS  Lab Results   Component Value Date    TACROL 7.3 05/23/2023    TACROL 11.4 07/11/2021    DOSTAC 5/22/2023 05/23/2023    DOSTAC Not Provided 07/11/2021       No components found for: CK  Lab Results   Component Value Date    MAG 2.2 05/23/2023    MAG 1.8 07/11/2021     Lab Results   Component Value Date    A1C 5.4 03/28/2023    A1C 5.1 05/04/2021     Lab Results   Component Value Date    PHOS 4.0 05/23/2023    PHOS 2.8 06/30/2021     Lab Results   Component Value Date    NTBNPI 3,277 (H) 08/24/2021    NTBNPI 1,697 (H) 06/28/2021     Lab Results   Component Value Date    SAITESTMET SA EDTA FCS 05/23/2023    SAITESTMET SA FCS 06/07/2021    SAICELL Class I 05/23/2023    SAICELL Class I 06/07/2021    [id number] Cw:12 05/23/2023    [id number] Cw:12 06/07/2021    [id number] None 05/23/2023    [id number] None 06/07/2021    SAIREPCOM  05/23/2023      HLA PRA Test performed by modified testing procedure that may also include pretreatment of serum. Pretreatment may be the addition of fetal calf serum, EDTA, and/or adsorption.  High-risk, MFI > 3,000.  Mod-risk, -3,000.    [facility]  06[facility]      Test performed by modified procedure. Serum heat inactivated and tested   by  a modified (Lamoure) protocol including fetal calf serum addition.   High-risk, mfi >3,000. Mod-risk, mfi 500-3,000.       Lab Results   Component Value Date    SAIITESTME SA EDTA FCS 05/23/2023    SAIITESTME SA FCS 06/07/2021    SAIICELL Class II 05/23/2023    SAIICELL Class II 06/07/2021    [id number] None 05/23/2023    [id number] None 06/07/2021    [id number] None 05/23/2023    [id number] None 06/07/2021    SAIIREPCOM  05/23/2023      HLA PRA Test performed by modified testing procedure that may also include pretreatment of serum. Pretreatment may be the addition of fetal calf serum, EDTA, and/or adsorption.  High-risk, MFI > 3,000.  Mod-risk, -3,000.    SAIIREPCOM  06/07/2021      Test performed by modified procedure. Serum heat inactivated and tested   by a modified (Lamoure) protocol including fetal calf serum addition.   High-risk, mfi >3,000. Mod-risk, mfi 500-3,000.       Lab Results   Component Value Date    CSPEC Plasma 06/29/2021     Assessment and Plan:  Mr. Jim Willingham is a 68yr old male with a history of NICM (s/p HM2 LVAD 6/2017) s/p OHT 5/6/21, VT, AFib, CKD, DMII, and COPD who presents to clinic for routine surveillance.    Labs from 5/23/23 showed stable electrolytes, renal function, FLP, TSH, and blood counts.  EBV level 5913 copies/3.8 logs.  No DSAs, AlloMap 32, ImmuKnow 200.  Tacro level 7.3.  Biopsy was negative for rejection.    Coronary angio 5/23/23 was negative for obstruction, and showed stable 20% lesion in RCA.  RHC showed normal biventricular filling pressures, with RA 8, mPA 26, PCW 15, and CI 3.2.    He will have a CXR following today's appt, and will have a stress cMRI in the coming weeks.    Mr. Willingham appears well today.  His BPs are controlled.  His weight trend remains stable, and he appears euvolemic.    Advised that he follow-up in clinic per protocol, or sooner should new concerns arise.        NICM, s/p OHT 5/6/21  His post-transplant course was c/b right pleural air leak  (required prolonged chest tube), pAFib, CAP (RLL, 6/2021), recurrent pleural effusions (s/p thoracenteses x2 6/2021 and 7/2021, exudative, repeatedly cultured negative), RLL cavitary lesions (per chest CT 7/14/21, BD glucan indeterminate, aspergillus negative, not started on antifungals), pericardial effusion (1.4cm per TTE 7/12/21, conservatively managed), low-grade EBV viremia, recurrent/persistent gout flare, transaminase elevation with questionable choledocholithiasis (conservatively managed with resolution), COVID-19 (8/2021, s/p monoclonal antibodies and remdesivir x5 days), and 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     Rejection history:  none  AlloMap scores:  < 35  DSAs:  none  Coronary angio/Ischemic eval:  Coronary angio 5/2022 showed minimal CAD (20% stenosis in RCA), and was negative for obstruction.  Last RHC:  5/2022 showed mildly normal biventricular filling pressures with RA 5, mPA 22, PCW 9, and CI 2.9.  Echo/cMRI:  TTE 1/2023 showed stable graft function, with LVEF 60-65% and normal RV size/function.     Serostatus:  - CMV D+/R+  - EBV D+/R+  - Toxo D-/R-     Immunosuppression:  - MMF 500mg twice daily (dose decreased due to multiple pneumonias)  - tacro, goal level 4-6.  Tacro level was 7.3.    PPx:  - CAV:  Aspirin 81mg daily and rosuvastatin 10mg daily.  - GI: Pantoprazole 40mg daily  - Osteoporosis: Calcium/vitamin D supplements -- discussed that he should restart this for bone health     Graft function:  - BPs: Controlled, not on antihypertensives  - fluid status: Euvolemic, not on diuretics     Health Maintenance:  - derm exam overdue  - eye exam due now  - dental exam UTD  - last colo 2016, was due 2021 -- discussed completing this now  - needs shingrix   - covid booster due  - pneumonia shots unclear, advised that he d/w PCP      Septic encephalopathy, resolved  Expressive aphasia, resolved  Presented 12/15/21 with expressive aphasia/word garbling with  complete understanding of others. Wife reports not noticing this occurring prior to day of admission. Patient denied headaches, loss of consciousness, or vision changes. Likely septic encephalopathy given WBC count (17.3).  - neuro consulted  - CT head 12/15- negative for ischemia or acute infarction  - CTA head and neck w/ contrast 12/15 - no aneurysms or stenosis of major intracranial/cervical arteries  - Groundglass opacities found on CTA and chest XR, likely infection.   - MRI brain 1/16 without acute findings, no c/f PRES  - urine drug screen and ethanol negative     Sepsis due to right lung PNA  WBC count 17.3 on admission, Chest CT completed 12/15 upon admission shows groundglass opacities and consolidative opacities throughout right lung, concerning for infection. WBC decreased to 11.4 after one day of IV zosyn and 7.6 on day of discharge. Transplant ID consulted. Zosyn 4.5g IV [id number] changed to doxycycline 100 mg bid x 3 weeks per transplant ID (through 1/5).     KALI cavitary lesion  +Aspergillus (9/2021)  S/p 2 months of voriconazole, follows with Transplant ID.     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  He remains asymptomatic, levels remain stable.     Gout/pseudogout  Predated transplant, has restricted mobility.  Has had recurrent flares following transplant that have required steroid bursts and Anakinra treatment.  He continues to follow with rheumatology.  Continue allopurinol and anakinra as needed -- note no recent flares, no recent anakinra.  Needs to follow-up with rheum.     CKD  Creatinine has typically ranged 1.5-1.9, remains stable.  He has been referred to nephrology, but has not been seen.  Will re-refer today.       Recurrent exudative pleural effusions (6/2021, 7/2021), resolved.   DMII:  management per PCP/endo  Depression:  Wellbutrin 75mg BID  COPD:  singulair, Trelegy Ellipta, Albuterol, follows with pulmonary.  COVID-19 infection: s/p monoclonal antibodies and  remdesivir (8/2021).         The above was reviewed with Mr. Willingham, who verbalized understanding and will call with further questions/concerns.    50 minutes spent with patient, along with preparing for visit, reviewing follow up, and completing documentation.      Shelia Means DNP, FNP-BC  Advanced Heart Failure Nurse Practitioner  [facility]  Patient Care Team:  Ricardo Gómez MD as PCP - General (Internal Medicine)  Elfego Hayden MD as MD (Internal Medicine)  Catalina Montgomery MD as Referring Physician (Cardiology)  Chase Qureshi MD as MD (Internal Medicine-Hematology & Oncology)  Kadie Pedro LICSW as  ( - Clinical)  Yolette Rueda RN as Transplant Coordinator  Akshat Parkinson [facility] as Pharmacist (Pharmacist)  Marcos Washington MD as MD (Infectious Diseases)  Lyle Sarmiento DO as MD (Nephrology)  Ricardo Gómez MD as Assigned PCP  Sheyla Fletcher MD as Assigned Sleep Provider  Corey Melendrez MD as MD (Dermatology)  Catalina Montgomery MD as Referring Physician (Cardiovascular Disease)  Catalina Montgomery MD as Assigned Heart and Vascular Provider  Nba Aguirre MD as MD (Neurology)  Nba Aguirre MD as Assigned Neuroscience Provider  Ricardo Gómez MD as Assigned Pain Medication Provider  Shelia Means NP as Nurse Practitioner (Cardiovascular Disease)  CATALINA MONTGOMERY

## 2023-06-07 NOTE — PATIENT INSTRUCTIONS
Patient Instructions  1. HAPPY BIRTHDAY!!  2. Please follow up with routine screenings, due for dermatology and eye doctor.   3. Please get a  shingrix vaccine at any pharmacy  (2 shot series) .   4. Please get covid booster. 2 weeks apart from any other vaccines.   5. Due for colonoscopy   6. Stop in [place] on your way out to see if you can do CXR today, then we'll cancel on 6/12.   7. Decrease tacro to 4 mg twice a day. Repeat in 1 week.     Next transplant clinic appointment: we will schedule your 30 month appointments.   Next lab draw: Next appointments.   Coordinator will call with all pending results.     Please call transplant coordinator with any questions:    Yolette Rueda RN BSN   Post Heart Transplant Nurse Coordinator  [facility]  Questions: [phone]

## 2023-06-07 NOTE — NURSING NOTE
Chief Complaint   Patient presents with     Follow Up     Post-transplant follow up       Vitals were taken, medications reconciled.    Tiffany Knight, EMT   7:30 AM

## 2023-06-07 NOTE — LETTER
6/7/2023      RE: Jim Willingham  [address] [facility] [address]       Dear Colleague,    Thank you for the opportunity to participate in the care of your patient, Jim Willingham, at the [facility]. Please see a copy of my visit note below.    ADULT HEART TRANSPLANT CLINIC  June 7, 2023    HPI:   Mr. Jim Willingham is a 68yr old male with a history of NICM (s/p HM2 LVAD 6/2017) s/p OHT 5/6/21, VT, AFib, CKD, DMII, and COPD who presents to clinic for routine surveillance.     His post-transplant course was c/b right pleural air leak (required prolonged chest tube), pAFib, CAP (RLL, 6/2021), recurrent pleural effusions (s/p thoracenteses x2 6/2021 and 7/2021, exudative, repeatedly cultured negative), RLL cavitary lesions (per chest CT 7/14/21, BD glucan indeterminate, aspergillus negative, not started on antifungals), pericardial effusion (1.4cm per TTE 7/12/21, conservatively managed), low-grade EBV viremia, recurrent/persistent gout flare, transaminase elevation with questionable choledocholithiasis (conservatively managed with resolution), COVID-19 (8/2021, s/p monoclonal antibodies and remdesivir x5 days), and 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     Rejection history:  none  AlloMap scores:  < 35  DSAs:  none  Coronary angio/Ischemic eval:  Coronary angio 5/2022 showed minimal CAD (20% stenosis in RCA), and was negative for obstruction.  Last RHC:  5/2022 showed mildly normal biventricular filling pressures with RA 5, mPA 22, PCW 9, and CI 2.9.  Echo/cMRI:  TTE 1/2023 showed stable graft function, with LVEF 60-65% and normal RV size/function.     Since his last visit, he states that he has been doing well.  He remains active, with no exertional concerns.  His breathing has been well.  He has had allergies, and has noted mild SOB for which he takes his inhaler as needed.  Otherwise,  breathing remains normal, and he denies PND and orthopnea.  His appetite has been well, and he is eating regularly without nausea, vomiting, diarrhea, and constipation.  His weight has been stable, ranging 195-200#, and he denies fluid retention.  His BPs have been stable, ranging 100-110/60s at home.  He otherwise denies chest pain, palpitations, dizziness, new headaches, acute vision changes, fevers, chills, cough, sore throat, and signs of bleeding.       Serostatus:  CMV D+/R+  EBV D+/R+  Toxo D-/R-    Patient Active Problem List    Diagnosis Date Noted    Transplanted heart (H) 05/23/2023     Priority: Medium    Thyroid disorder screening 05/23/2023     Priority: Medium    Lipid screening 05/23/2023     Priority: Medium    Prostate cancer screening 05/23/2023     Priority: Medium    Peripheral polyneuropathy 03/28/2023     Priority: Medium    History of type 2 diabetes mellitus 03/28/2023     Priority: Medium    Hyperlipidemia LDL goal <70 09/27/2022     Priority: Medium    Status post coronary angiogram 05/17/2022     Priority: Medium    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     Priority: Medium    Mild anemia 03/18/2022     Priority: Medium    Hx of aspergillosis 12/16/2021     Priority: Medium    Encounter for monitoring tacrolimus therapy 12/11/2021     Priority: Medium    Pulmonary cavitary lesion 11/18/2021     Priority: Medium    Generalized muscle weakness 07/08/2021     Priority: Medium    Abnormal CT scan of lung 05/25/2021     Priority: Medium    Need for prophylactic antibiotic 05/12/2021     Priority: Medium    S/P orthotopic heart transplant (H) 02/05/2021     Priority: Medium     Added automatically from request for surgery [id number]      Bilateral carpal tunnel syndrome 11/02/2020     Priority: Medium    Rotator cuff tear arthropathy of both shoulders 11/02/2020     Priority: Medium    COPD (chronic obstructive pulmonary disease) (H) 11/02/2020     Priority: Medium    Major depression, recurrent,  chronic (H) 11/02/2020     Priority: Medium    Gouty arthropathy, chronic, without tophi 11/02/2020     Priority: Medium    Iron deficiency anemia 07/31/2018     Priority: Medium    Stage 3b chronic kidney disease (H) 05/11/2017     Priority: Medium    H/O gastric bypass 05/11/2017     Priority: Medium    Vitamin B12 deficiency (non anemic) 05/11/2017     Priority: Medium       PAST MEDICAL HISTORY:  Past Medical History:   Diagnosis Date    Bariatric surgery status 2003    Benign essential hypertension 05/11/2017    Bilateral carpal tunnel syndrome 11/02/2020    Cardiomyopathy, unspecified (H) 05/08/2017    CKD (chronic kidney disease) stage 3, GFR 30-59 ml/min (H) 05/11/2017    COPD (chronic obstructive pulmonary disease) (H) 11/02/2020    Depression 05/11/2017    Diabetes mellitus (H) 1995    Leigh-Barr virus viremia 3/18/2022    Gouty arthropathy, chronic, without tophi 11/02/2020    H/O gastric bypass 05/11/2017    History of type 2 diabetes mellitus 3/28/2023    Hyperlipidemia LDL goal <70 9/27/2022    ICD (implantable cardioverter-defibrillator), biventricular, in situ 05/11/2017    LVAD (left ventricular assist device) present (H)     Major depression, recurrent, chronic (H) 11/02/2020    Mild anemia 3/18/2022    NICM (nonischemic cardiomyopathy) (H)/ EF 20% 05/11/2017    ECHO: LVEDd. 7.66 cm, Restrictive pattern , Severe mitral valve regurgitation    CECILIA (obstructive sleep apnea) 05/11/2017    Paroxysmal atrial fibrillation (H) 05/11/2017    Paroxysmal VT (H) 05/11/2017    Peripheral polyneuropathy 3/28/2023    Pulmonary cavitary lesion 11/18/2021    Rotator cuff tear arthropathy of both shoulders 11/02/2020    Type 2 diabetes mellitus with diabetic polyneuropathy (H) 3/18/2022    Uncomplicated asthma     Vitamin B12 deficiency (non anemic) 05/11/2017       CURRENT MEDICATIONS:  Prescription Medications as of 6/7/2023         Rx Number Disp Refills Start End Last Dispensed Date Next Fill Date Owning  Pharmacy    acetaminophen (TYLENOL) 325 MG tablet  100 tablet 1 7/5/2021    [place], [place] - [address] [place] [address]    Sig: Take 3 tablets (975 mg) by mouth every 6 hours as needed for other (For optimal non-opioid multimodal pain management to improve pain control.)    Class: E-Prescribe    Route: Oral    albuterol (VENTOLIN HFA) 108 (90 Base) MCG/ACT inhaler  18 g 11 3/28/2023    [facility] [address] [place] [address]    Sig: INHALE 2 PUFFS BY MOUTH EVERY 4 HOURS AS NEEDED. MAX OF 12 PUFFS PER 24 HOURS    Class: E-Prescribe    Notes to Pharmacy: Pharmacy may dispense brand covered by insurance (Proair, or proventil or ventolin or generic albuterol inhaler)    allopurinol (ZYLOPRIM) 300 MG tablet  90 tablet 3 3/28/2023    [facility] [address] [place] [address]    Sig: Take 1 tablet (300 mg) by mouth daily    Class: E-Prescribe    Route: Oral    anakinra (KINERET) 100 MG/0.67ML SOSY injection  20.1 mL 6 1/10/2022 2/9/2022   [facility] - [place], [place] - [address] [place] [address]    Sig: Inject 0.67 mLs (100 mg) Subcutaneous daily As directed by rheumatology    Class: E-Prescribe    Route: Subcutaneous    aspirin (ASA) 81 MG chewable tablet  100 tablet 1 6/1/2021    [place], [place] - [address] [place] [address]    Sig: Take 1 tablet (81 mg) by mouth daily    Class: E-Prescribe    Route: Oral    buPROPion (WELLBUTRIN) 75 MG tablet  180 tablet 3 3/28/2023    [facility] [address] [place] [address] [address] [facility]    Sig: TAKE 1 TABLET(75 MG) BY MOUTH TWICE DAILY    Class: E-Prescribe    calcium citrate-vitamin D (CITRACAL) 315-250 MG-UNIT TABS per tablet  180 tablet 3 11/18/2021    [id number] - [employer] [address] [facility]  [address]    Sig: Take 1 tablet by mouth 2 times daily    Class: E-Prescribe    Route: Oral    docusate sodium (COLACE) 100  MG capsule  60 capsule 3 12/17/2021    [place], [place] - [address] [facility]    Sig: Take 1 capsule (100 mg) by mouth 2 times daily    Class: E-Prescribe    Route: Oral    Fluticasone-Umeclidin-Vilanterol (TRELEGY ELLIPTA) 100-62.5-25 MCG/INH oral inhaler  1 each 11 9/27/2022    [facility] [address] [place] [address], [address]    Sig: Inhale 1 puff into the lungs daily    Class: E-Prescribe    Route: Inhalation    gabapentin (NEURONTIN) 300 MG capsule  180 capsule 3 3/28/2023    [facility] [address] [place] [address], [address]    Sig: Take 1 capsule (300 mg) by mouth 2 times daily    Class: E-Prescribe    Route: Oral    Melatonin 10 MG CAPS            Sig: Take 1 capsule by mouth At Bedtime    Class: Historical    Route: Oral    montelukast (SINGULAIR) 10 MG tablet  90 tablet 3 3/28/2023    [facility] [address] [place] [address], [address]    Sig: TAKE 1 TABLET(10 MG) BY MOUTH AT BEDTIME    Class: E-Prescribe    mycophenolate (GENERIC EQUIVALENT) 250 MG capsule  360 capsule 3 4/4/2023    [facility] [address] [place] [address]    Sig: TAKE TWO CAPSULES BY MOUTH TWICE A DAY    Class: E-Prescribe    rosuvastatin (CRESTOR) 10 MG tablet  90 tablet 3 3/28/2023    [facility] [address] [place] [address]    Sig: Take 1 tablet (10 mg) by mouth daily    Class: E-Prescribe    Route: Oral    tacrolimus (GENERIC EQUIVALENT) 0.5 MG capsule  30 capsule 11 5/16/2023    [facility] [address] [place] [address]    Sig: TAKE ONE CAPSULE BY MOUTH EVERY MORNING    Class: E-Prescribe    tacrolimus (GENERIC EQUIVALENT) 1 MG capsule  90 capsule 11 5/16/2023    [facility] [address] [place] [address]    Sig: TAKE FOUR CAPSULES BY MOUTH IN THE MORNING AND FOUR CAPSULES BY MOUTH IN THE EVENING    Class: E-Prescribe    traMADol (ULTRAM) 50 MG tablet   "120 tablet 0 3/20/2023    [facility] [id number] [facility] [address] [facility]    Sig: TAKE ONE TABLET BY MOUTH EVERY MORNING AND IN THE AFTERNOON AND TAKE TWO TABLETS BY MOUTH AT BEDTIME    Class: E-Prescribe            ROS:   Answers for HPI/ROS submitted by the patient on 6/5/2023  General Symptoms: No  Skin Symptoms: No  HENT Symptoms: No  EYE SYMPTOMS: No  HEART SYMPTOMS: No  LUNG SYMPTOMS: No  INTESTINAL SYMPTOMS: No  URINARY SYMPTOMS: No  REPRODUCTIVE SYMPTOMS: No  SKELETAL SYMPTOMS: No  BLOOD SYMPTOMS: No  NERVOUS SYSTEM SYMPTOMS: No  MENTAL HEALTH SYMPTOMS: No    Exam:  /89 (BP Location: Right arm, Patient Position: Sitting, Cuff Size: Adult Regular)   Pulse 98   Ht 1.753 m (5' 9.02\")   Wt 89.7 kg (197 lb 11.2 oz)   SpO2 96%   BMI 29.18 kg/m    In general, the patient is a pleasant male in no apparent distress.    HEENT: NC/AT. PERRLA. EOMI.  Sclerae white, not injected.    Neck:  No adenopathy, No thyromegaly.    COR: No jugular venous distention when sitting upright.  RRR.  Normal S1 S2 splits physiologically.  No murmur, rub click, or gallop.    Lungs:  CTA. No rhonchi.    Abdomen: soft, nontender, nondistended.  No organomegaly.  Extremities:  No clubbing, cyanosis, or LE edema.    Neuro: Alert & Oriented x 3, grossly non focal.  Integument: no open lesions, rashes, or jaundice.    Labs:  CBC RESULTS:   Lab Results   Component Value Date    WBC 4.2 05/23/2023    WBC 7.8 07/11/2021    RBC 3.95 (L) 05/23/2023    RBC 3.06 (L) 07/11/2021    HGB 11.3 (L) 05/23/2023    HGB 11.9 (L) 05/23/2023    HGB 8.7 (L) 07/11/2021    HCT 39.0 (L) 05/23/2023    HCT 28.4 (L) 07/11/2021    MCV 99 05/23/2023    MCV 93 07/11/2021    MCH 30.1 05/23/2023    MCH 28.4 07/11/2021    MCHC 30.5 (L) 05/23/2023    MCHC 30.6 (L) 07/11/2021    RDW 13.4 05/23/2023    RDW 15.6 (H) 07/11/2021     05/23/2023     07/11/2021       BMP RESULTS:  Lab Results   Component Value Date     05/23/2023    NA " 132 (L) 07/11/2021    POTASSIUM 4.8 05/23/2023    POTASSIUM 4.3 03/28/2023    POTASSIUM 5.1 07/11/2021    CHLORIDE 108 (H) 05/23/2023    CHLORIDE 111 (H) 03/28/2023    CHLORIDE 104 07/11/2021    CO2 18 (L) 05/23/2023    CO2 23 03/28/2023    CO2 23 07/11/2021    ANIONGAP 13 05/23/2023    ANIONGAP 4 03/28/2023    ANIONGAP 6 07/11/2021    GLC 89 05/23/2023     (H) 03/28/2023     (H) 07/11/2021    BUN 36.0 (H) 05/23/2023    BUN 25 03/28/2023    BUN 49 (H) 07/11/2021    CR 1.75 (H) 05/23/2023    CR 2.75 (H) 07/11/2021    GFRESTIMATED 43 (L) 05/23/2023    GFRESTIMATED 42 (L) 12/15/2021    GFRESTIMATED 23 (L) 07/11/2021    GFRESTBLACK 27 (L) 07/11/2021    QAMAR 7.6 (L) 05/23/2023    QAMAR 7.9 (L) 07/11/2021      LIPID RESULTS:  Lab Results   Component Value Date    CHOL 119 05/23/2023    CHOL 209 (H) 11/05/2020    HDL 73 05/23/2023    HDL 87 11/05/2020    LDL 36 05/23/2023     (H) 11/05/2020    TRIG 49 05/23/2023    TRIG 91 11/05/2020    NHDL 46 05/23/2023    NHDL 122 11/05/2020       IMMUNOSUPPRESSANT LEVELS  Lab Results   Component Value Date    TACROL 7.3 05/23/2023    TACROL 11.4 07/11/2021    DOSTAC 5/22/2023 05/23/2023    DOSTAC Not Provided 07/11/2021       No components found for: CK  Lab Results   Component Value Date    MAG 2.2 05/23/2023    MAG 1.8 07/11/2021     Lab Results   Component Value Date    A1C 5.4 03/28/2023    A1C 5.1 05/04/2021     Lab Results   Component Value Date    PHOS 4.0 05/23/2023    PHOS 2.8 06/30/2021     Lab Results   Component Value Date    NTBNPI 3,277 (H) 08/24/2021    NTBNPI 1,697 (H) 06/28/2021     Lab Results   Component Value Date    SAITESTMET SA EDTA FCS 05/23/2023    SAITESTMET SA FCS 06/07/2021    SAICELL Class I 05/23/2023    SAICELL Class I 06/07/2021    [id number] Cw:12 05/23/2023    [id number] Cw:12 06/07/2021    [id number] None 05/23/2023    [id number] None 06/07/2021    SAIREPCOM  05/23/2023      HLA PRA Test performed by modified testing procedure that may  also include pretreatment of serum. Pretreatment may be the addition of fetal calf serum, EDTA, and/or adsorption.  High-risk, MFI > 3,000.  Mod-risk, -3,000.    SAIREPCOM  06/07/2021      Test performed by modified procedure. Serum heat inactivated and tested   by a modified (Fowler) protocol including fetal calf serum addition.   High-risk, mfi >3,000. Mod-risk, mfi 500-3,000.       Lab Results   Component Value Date    SAIITESTME SA EDTA FCS 05/23/2023    SAIITESTME SA FCS 06/07/2021    SAIICELL Class II 05/23/2023    SAIICELL Class II 06/07/2021    [id number] None 05/23/2023    [id number] None 06/07/2021    [id number] None 05/23/2023    [id number] None 06/07/2021    SAIIREPCOM  05/23/2023      HLA PRA Test performed by modified testing procedure that may also include pretreatment of serum. Pretreatment may be the addition of fetal calf serum, EDTA, and/or adsorption.  High-risk, MFI > 3,000.  Mod-risk, -3,000.    SAIIREPCOM  06/07/2021      Test performed by modified procedure. Serum heat inactivated and tested   by a modified (Fowler) protocol including fetal calf serum addition.   High-risk, mfi >3,000. Mod-risk, mfi 500-3,000.       Lab Results   Component Value Date    CSPEC Plasma 06/29/2021     Assessment and Plan:  Mr. Jim Willingham is a 68yr old male with a history of NICM (s/p HM2 LVAD 6/2017) s/p OHT 5/6/21, VT, AFib, CKD, DMII, and COPD who presents to clinic for routine surveillance.    Labs from 5/23/23 showed stable electrolytes, renal function, FLP, TSH, and blood counts.  EBV level 5913 copies/3.8 logs.  No DSAs, AlloMap 32, ImmuKnow 200.  Tacro level 7.3.  Biopsy was negative for rejection.    Coronary angio 5/23/23 was negative for obstruction, and showed stable 20% lesion in RCA.  RHC showed normal biventricular filling pressures, with RA 8, mPA 26, PCW 15, and CI 3.2.    He will have a CXR following today's appt, and will have a stress cMRI in the coming weeks.    Mr. Willingham appears  well today.  His BPs are controlled.  His weight trend remains stable, and he appears euvolemic.    Advised that he follow-up in clinic per protocol, or sooner should new concerns arise.        KAILAM, s/p OHT 5/6/21  His post-transplant course was c/b right pleural air leak (required prolonged chest tube), pAFib, CAP (RLL, 6/2021), recurrent pleural effusions (s/p thoracenteses x2 6/2021 and 7/2021, exudative, repeatedly cultured negative), RLL cavitary lesions (per chest CT 7/14/21, BD glucan indeterminate, aspergillus negative, not started on antifungals), pericardial effusion (1.4cm per TTE 7/12/21, conservatively managed), low-grade EBV viremia, recurrent/persistent gout flare, transaminase elevation with questionable choledocholithiasis (conservatively managed with resolution), COVID-19 (8/2021, s/p monoclonal antibodies and remdesivir x5 days), and 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     Rejection history:  none  AlloMap scores:  < 35  DSAs:  none  Coronary angio/Ischemic eval:  Coronary angio 5/2022 showed minimal CAD (20% stenosis in RCA), and was negative for obstruction.  Last RHC:  5/2022 showed mildly normal biventricular filling pressures with RA 5, mPA 22, PCW 9, and CI 2.9.  Echo/cMRI:  TTE 1/2023 showed stable graft function, with LVEF 60-65% and normal RV size/function.     Serostatus:  - CMV D+/R+  - EBV D+/R+  - Toxo D-/R-     Immunosuppression:  - MMF 500mg twice daily (dose decreased due to multiple pneumonias)  - tacro, goal level 4-6.  Tacro level was 7.3.    PPx:  - CAV:  Aspirin 81mg daily and rosuvastatin 10mg daily.  - GI: Pantoprazole 40mg daily  - Osteoporosis: Calcium/vitamin D supplements -- discussed that he should restart this for bone health     Graft function:  - BPs: Controlled, not on antihypertensives  - fluid status: Euvolemic, not on diuretics     Health Maintenance:  - derm exam overdue  - eye exam due now  - dental exam UTD  - last colo  2016, was due 2021 -- discussed completing this now  - needs shingrix   - covid booster due  - pneumonia shots unclear, advised that he d/w PCP      Septic encephalopathy, resolved  Expressive aphasia, resolved  Presented 12/15/21 with expressive aphasia/word garbling with complete understanding of others. Wife reports not noticing this occurring prior to day of admission. Patient denied headaches, loss of consciousness, or vision changes. Likely septic encephalopathy given WBC count (17.3).  - neuro consulted  - CT head 12/15- negative for ischemia or acute infarction  - CTA head and neck w/ contrast 12/15 - no aneurysms or stenosis of major intracranial/cervical arteries  - Groundglass opacities found on CTA and chest XR, likely infection.   - MRI brain 1/16 without acute findings, no c/f PRES  - urine drug screen and ethanol negative     Sepsis due to right lung PNA  WBC count 17.3 on admission, Chest CT completed 12/15 upon admission shows groundglass opacities and consolidative opacities throughout right lung, concerning for infection. WBC decreased to 11.4 after one day of IV zosyn and 7.6 on day of discharge. Transplant ID consulted. Zosyn 4.5g IV [id number] changed to doxycycline 100 mg bid x 3 weeks per transplant ID (through 1/5).     KALI cavitary lesion  +Aspergillus (9/2021)  S/p 2 months of voriconazole, follows with Transplant ID.     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  He remains asymptomatic, levels remain stable.     Gout/pseudogout  Predated transplant, has restricted mobility.  Has had recurrent flares following transplant that have required steroid bursts and Anakinra treatment.  He continues to follow with rheumatology.  Continue allopurinol and anakinra as needed -- note no recent flares, no recent anakinra.  Needs to follow-up with rheum.     CKD  Creatinine has typically ranged 1.5-1.9, remains stable.  He has been referred to nephrology, but has not been seen.  Will re-refer  today.       Recurrent exudative pleural effusions (6/2021, 7/2021), resolved.   DMII:  management per PCP/endo  Depression:  Wellbutrin 75mg BID  COPD:  singulair, Trelegy Ellipta, Albuterol, follows with pulmonary.  COVID-19 infection: s/p monoclonal antibodies and remdesivir (8/2021).         The above was reviewed with Mr. Willingham, who verbalized understanding and will call with further questions/concerns.    50 minutes spent with patient, along with preparing for visit, reviewing follow up, and completing documentation.      Shelia Means, JOSE, FN-BC  Advanced Heart Failure Nurse Practitioner  [facility]  Patient Care Team:  Ricardo Gómez MD as PCP - General (Internal Medicine)  Elfego Hayden MD as MD (Internal Medicine)  Delisa Montgomery MD as Referring Physician (Cardiology)  Chase Qureshi MD as MD (Internal Medicine-Hematology & Oncology)  Kadie Pedro LICSW as  ( - Clinical)

## 2023-06-13 ENCOUNTER — TELEPHONE (OUTPATIENT)
Dept: TRANSPLANT | Facility: CLINIC | Age: 68
End: 2023-06-13
Payer: COMMERCIAL

## 2023-06-13 DIAGNOSIS — Z94.1 S/P ORTHOTOPIC HEART TRANSPLANT (H): ICD-10-CM

## 2023-06-13 DIAGNOSIS — Z94.1 TRANSPLANTED HEART (H): Primary | ICD-10-CM

## 2023-06-13 RX ORDER — MYCOPHENOLATE MOFETIL 250 MG/1
CAPSULE ORAL
Qty: 360 CAPSULE | Refills: 3 | COMMUNITY
Start: 2023-06-13 | End: 2023-07-28

## 2023-06-13 NOTE — TELEPHONE ENCOUNTER
After reviewing immuknow = 200 with Dr. Montgomery and Shelia Means, ok to decrease MMF to 250 mg bid. Medlist updated and patient made aware. No further questions or complaints at this time.

## 2023-06-19 ENCOUNTER — MYC MEDICAL ADVICE (OUTPATIENT)
Dept: FAMILY MEDICINE | Facility: CLINIC | Age: 68
End: 2023-06-19

## 2023-06-19 ENCOUNTER — TELEPHONE (OUTPATIENT)
Dept: FAMILY MEDICINE | Facility: CLINIC | Age: 68
End: 2023-06-19
Payer: COMMERCIAL

## 2023-06-19 NOTE — TELEPHONE ENCOUNTER
Patient Quality Outreach    Patient is due for the following:   Diabetes -  eGFR, Eye Exam and Foot Exam  Hypertension -  BP check  Physical Annual Wellness Visit      Topic Date Due     Zoster (Shingles) Vaccine (1 of 2) Never done     Pneumococcal Vaccine (2 - PPSV23 if available, else PCV20) 04/20/2015     Chronic Opioid Use -  Urine Drug Screen, BRIAN-7 and PHQ-9    Next Steps:   Patient has upcoming appointment, these items will be addressed at that time. on 09/26/2023 with .    Type of outreach:    Sent Linkfluence message.      Questions for provider review:    None           Maricarmen Tian MA

## 2023-07-03 NOTE — PLAN OF CARE
Neuro: A/Ox4. Slept between cares. Less lethargic than previous night.  Cardiac: SR/ST with HR  BP elevated, Hydralazine ordered x 1 per MD.   Respiratory: O2 sats 96% RA. LS crackles in base. RESENDIZ at times. Albuterol inhaler given.   GI/: Voiding adequate and had large loose BM this am. NG clamped. No nausea all shift.   Diet/appetite: NPO for RHC  Activity: Up with assist of 1 to chair  Pain: Denies pain  Skin: Sternal inc CDI, old CT dressing CDI  LDA's: PICC and PIV x 1  Tacro gtt infusing as ordered. Gets BID IV Cellcept. BG running high 255, PT NPO, did not cover yet this am with sliding scale. Received 2 units PRBC overnight. Recheck hgb 8.3. Lactic recheck 1.6. Will continue to monitor pt.      Plan: RHC and bx today.  Notify primary team with changes.     0 (no pain/absence of nonverbal indicators of pain)

## 2023-07-12 ENCOUNTER — LAB (OUTPATIENT)
Dept: LAB | Facility: CLINIC | Age: 68
End: 2023-07-12
Payer: COMMERCIAL

## 2023-07-12 DIAGNOSIS — Z94.1 TRANSPLANTED HEART (H): ICD-10-CM

## 2023-07-12 LAB
ERYTHROCYTE [DISTWIDTH] IN BLOOD BY AUTOMATED COUNT: 13.1 % (ref 10–15)
HCT VFR BLD AUTO: 37.1 % (ref 40–53)
HGB BLD-MCNC: 11.5 G/DL (ref 13.3–17.7)
MCH RBC QN AUTO: 30.4 PG (ref 26.5–33)
MCHC RBC AUTO-ENTMCNC: 31 G/DL (ref 31.5–36.5)
MCV RBC AUTO: 98 FL (ref 78–100)
PLATELET # BLD AUTO: 195 10E3/UL (ref 150–450)
RBC # BLD AUTO: 3.78 10E6/UL (ref 4.4–5.9)
WBC # BLD AUTO: 5.1 10E3/UL (ref 4–11)

## 2023-07-12 PROCEDURE — 80048 BASIC METABOLIC PNL TOTAL CA: CPT

## 2023-07-12 PROCEDURE — 80197 ASSAY OF TACROLIMUS: CPT

## 2023-07-12 PROCEDURE — 85027 COMPLETE CBC AUTOMATED: CPT

## 2023-07-12 PROCEDURE — 36415 COLL VENOUS BLD VENIPUNCTURE: CPT

## 2023-07-13 DIAGNOSIS — Z94.1 S/P ORTHOTOPIC HEART TRANSPLANT (H): ICD-10-CM

## 2023-07-13 LAB
ANION GAP SERPL CALCULATED.3IONS-SCNC: 10 MMOL/L (ref 7–15)
BUN SERPL-MCNC: 40.7 MG/DL (ref 8–23)
CALCIUM SERPL-MCNC: 7.5 MG/DL (ref 8.8–10.2)
CHLORIDE SERPL-SCNC: 113 MMOL/L (ref 98–107)
CREAT SERPL-MCNC: 1.81 MG/DL (ref 0.67–1.17)
DEPRECATED HCO3 PLAS-SCNC: 17 MMOL/L (ref 22–29)
GFR SERPL CREATININE-BSD FRML MDRD: 41 ML/MIN/1.73M2
GLUCOSE SERPL-MCNC: 55 MG/DL (ref 70–99)
POTASSIUM SERPL-SCNC: 5 MMOL/L (ref 3.4–5.3)
SODIUM SERPL-SCNC: 140 MMOL/L (ref 136–145)
TACROLIMUS BLD-MCNC: 6.9 UG/L (ref 5–15)
TME LAST DOSE: NORMAL H
TME LAST DOSE: NORMAL H

## 2023-07-13 RX ORDER — TACROLIMUS 1 MG/1
CAPSULE ORAL
Qty: 90 CAPSULE | Refills: 11 | COMMUNITY
Start: 2023-07-13 | End: 2023-07-21

## 2023-07-20 ENCOUNTER — LAB (OUTPATIENT)
Dept: LAB | Facility: CLINIC | Age: 68
End: 2023-07-20
Payer: COMMERCIAL

## 2023-07-20 DIAGNOSIS — Z94.1 TRANSPLANTED HEART (H): ICD-10-CM

## 2023-07-20 LAB
ANION GAP SERPL CALCULATED.3IONS-SCNC: 10 MMOL/L (ref 7–15)
BUN SERPL-MCNC: 28.3 MG/DL (ref 8–23)
CALCIUM SERPL-MCNC: 8.1 MG/DL (ref 8.8–10.2)
CHLORIDE SERPL-SCNC: 108 MMOL/L (ref 98–107)
CREAT SERPL-MCNC: 1.75 MG/DL (ref 0.67–1.17)
DEPRECATED HCO3 PLAS-SCNC: 18 MMOL/L (ref 22–29)
ERYTHROCYTE [DISTWIDTH] IN BLOOD BY AUTOMATED COUNT: 13.2 % (ref 10–15)
GFR SERPL CREATININE-BSD FRML MDRD: 42 ML/MIN/1.73M2
GLUCOSE SERPL-MCNC: 131 MG/DL (ref 70–99)
HCT VFR BLD AUTO: 39.9 % (ref 40–53)
HGB BLD-MCNC: 12.8 G/DL (ref 13.3–17.7)
MCH RBC QN AUTO: 30.7 PG (ref 26.5–33)
MCHC RBC AUTO-ENTMCNC: 32.1 G/DL (ref 31.5–36.5)
MCV RBC AUTO: 96 FL (ref 78–100)
PLATELET # BLD AUTO: 192 10E3/UL (ref 150–450)
POTASSIUM SERPL-SCNC: 4.3 MMOL/L (ref 3.4–5.3)
RBC # BLD AUTO: 4.17 10E6/UL (ref 4.4–5.9)
SODIUM SERPL-SCNC: 136 MMOL/L (ref 136–145)
TACROLIMUS BLD-MCNC: 6.4 UG/L (ref 5–15)
TME LAST DOSE: NORMAL H
TME LAST DOSE: NORMAL H
WBC # BLD AUTO: 4.2 10E3/UL (ref 4–11)

## 2023-07-20 PROCEDURE — 80048 BASIC METABOLIC PNL TOTAL CA: CPT

## 2023-07-20 PROCEDURE — 85027 COMPLETE CBC AUTOMATED: CPT

## 2023-07-20 PROCEDURE — 80197 ASSAY OF TACROLIMUS: CPT

## 2023-07-20 PROCEDURE — 36415 COLL VENOUS BLD VENIPUNCTURE: CPT

## 2023-07-21 DIAGNOSIS — Z94.1 S/P ORTHOTOPIC HEART TRANSPLANT (H): ICD-10-CM

## 2023-07-21 RX ORDER — TACROLIMUS 0.5 MG/1
CAPSULE ORAL
Qty: 30 CAPSULE | Refills: 4 | Status: SHIPPED | OUTPATIENT
Start: 2023-07-21 | End: 2023-08-07

## 2023-07-21 RX ORDER — TACROLIMUS 1 MG/1
CAPSULE ORAL
Qty: 90 CAPSULE | Refills: 11 | Status: SHIPPED | OUTPATIENT
Start: 2023-07-21 | End: 2023-08-07

## 2023-07-21 NOTE — RESULT ENCOUNTER NOTE
Tacrolimus level 6.4. Goal 4-6. 12hr trough. Current dose 4mg in the AM and 3mg in the PM. Decrease dose to 3.5mg in the morning and 3mg in the evening. Repeat level in one week. Called pt to review and give instructions. Pt verbalized understanding.

## 2023-07-27 DIAGNOSIS — Z94.1 S/P ORTHOTOPIC HEART TRANSPLANT (H): ICD-10-CM

## 2023-07-28 DIAGNOSIS — Z94.1 S/P ORTHOTOPIC HEART TRANSPLANT (H): ICD-10-CM

## 2023-07-28 RX ORDER — TRAMADOL HYDROCHLORIDE 50 MG/1
TABLET ORAL
Qty: 120 TABLET | Refills: 0 | Status: SHIPPED | OUTPATIENT
Start: 2023-07-28 | End: 2024-01-01

## 2023-07-28 RX ORDER — MYCOPHENOLATE MOFETIL 250 MG/1
CAPSULE ORAL
Qty: 360 CAPSULE | Refills: 3 | Status: SHIPPED | OUTPATIENT
Start: 2023-07-28 | End: 2023-08-08

## 2023-08-07 DIAGNOSIS — Z94.1 S/P ORTHOTOPIC HEART TRANSPLANT (H): ICD-10-CM

## 2023-08-07 RX ORDER — TACROLIMUS 0.5 MG/1
CAPSULE ORAL
Qty: 30 CAPSULE | Refills: 4 | Status: SHIPPED | OUTPATIENT
Start: 2023-08-07 | End: 2023-01-01

## 2023-08-07 RX ORDER — TACROLIMUS 1 MG/1
CAPSULE ORAL
Qty: 90 CAPSULE | Refills: 11 | Status: SHIPPED | OUTPATIENT
Start: 2023-08-07 | End: 2023-01-01

## 2023-08-08 ENCOUNTER — TELEPHONE (OUTPATIENT)
Dept: TRANSPLANT | Facility: CLINIC | Age: 68
End: 2023-08-08

## 2023-08-08 DIAGNOSIS — Z94.1 S/P ORTHOTOPIC HEART TRANSPLANT (H): ICD-10-CM

## 2023-08-08 RX ORDER — MYCOPHENOLATE MOFETIL 250 MG/1
CAPSULE ORAL
Qty: 180 CAPSULE | Refills: 3 | Status: ON HOLD | OUTPATIENT
Start: 2023-08-08 | End: 2024-01-01

## 2023-08-08 NOTE — TELEPHONE ENCOUNTER
Patient Call: General  Route to LPN    Reason for call: Jim is wanting Coordinator to send a refill request to [facility] [address] [place] for his Mycophenolate 250 mg due to his optum Home delivery will not be able to get it to him for 3 wks patient is looking for a 30 day supply [phone]     Call back needed? Yes    Return Call Needed  Same as documented in contacts section  When to return call?: Same day: Route High Priority

## 2023-08-19 ENCOUNTER — HEALTH MAINTENANCE LETTER (OUTPATIENT)
Age: 68
End: 2023-08-19

## 2023-08-21 ENCOUNTER — HOSPITAL ENCOUNTER (OUTPATIENT)
Dept: MRI IMAGING | Facility: CLINIC | Age: 68
Discharge: HOME | End: 2023-08-21
Attending: INTERNAL MEDICINE | Admitting: INTERNAL MEDICINE
Payer: COMMERCIAL

## 2023-08-21 VITALS
SYSTOLIC BLOOD PRESSURE: 149 MMHG | OXYGEN SATURATION: 100 % | DIASTOLIC BLOOD PRESSURE: 107 MMHG | HEART RATE: 97 BPM | RESPIRATION RATE: 16 BRPM

## 2023-08-21 DIAGNOSIS — Z94.1 TRANSPLANTED HEART (H): ICD-10-CM

## 2023-08-21 PROCEDURE — 75563 CARD MRI W/STRESS IMG & DYE: CPT | Mod: 26 | Performed by: RADIOLOGY

## 2023-08-21 PROCEDURE — 250N000013 HC RX MED GY IP 250 OP 250 PS 637: Performed by: RADIOLOGY

## 2023-08-21 PROCEDURE — 250N000011 HC RX IP 250 OP 636: Mod: JZ | Performed by: RADIOLOGY

## 2023-08-21 PROCEDURE — 93005 ELECTROCARDIOGRAM TRACING: CPT

## 2023-08-21 PROCEDURE — 93018 CV STRESS TEST I&R ONLY: CPT | Performed by: RADIOLOGY

## 2023-08-21 PROCEDURE — 93016 CV STRESS TEST SUPVJ ONLY: CPT | Performed by: RADIOLOGY

## 2023-08-21 PROCEDURE — 75563 CARD MRI W/STRESS IMG & DYE: CPT

## 2023-08-21 PROCEDURE — A9585 GADOBUTROL INJECTION: HCPCS | Mod: JZ | Performed by: INTERNAL MEDICINE

## 2023-08-21 PROCEDURE — 255N000002 HC RX 255 OP 636: Mod: JZ | Performed by: INTERNAL MEDICINE

## 2023-08-21 RX ORDER — DIPHENHYDRAMINE HYDROCHLORIDE 50 MG/ML
25-50 INJECTION INTRAMUSCULAR; INTRAVENOUS
Status: DISCONTINUED | OUTPATIENT
Start: 2023-08-21 | End: 2023-08-22 | Stop reason: HOSPADM

## 2023-08-21 RX ORDER — ONDANSETRON 2 MG/ML
4 INJECTION INTRAMUSCULAR; INTRAVENOUS
Status: DISCONTINUED | OUTPATIENT
Start: 2023-08-21 | End: 2023-08-22 | Stop reason: HOSPADM

## 2023-08-21 RX ORDER — DIAZEPAM 5 MG
5 TABLET ORAL EVERY 30 MIN PRN
Status: DISCONTINUED | OUTPATIENT
Start: 2023-08-21 | End: 2023-08-22 | Stop reason: HOSPADM

## 2023-08-21 RX ORDER — ALBUTEROL SULFATE 90 UG/1
2 AEROSOL, METERED RESPIRATORY (INHALATION) EVERY 5 MIN PRN
Status: DISCONTINUED | OUTPATIENT
Start: 2023-08-21 | End: 2023-08-22 | Stop reason: HOSPADM

## 2023-08-21 RX ORDER — AMINOPHYLLINE 25 MG/ML
100 INJECTION, SOLUTION INTRAVENOUS ONCE
Status: COMPLETED | OUTPATIENT
Start: 2023-08-21 | End: 2023-08-21

## 2023-08-21 RX ORDER — REGADENOSON 0.08 MG/ML
0.4 INJECTION, SOLUTION INTRAVENOUS ONCE
Status: COMPLETED | OUTPATIENT
Start: 2023-08-21 | End: 2023-08-21

## 2023-08-21 RX ORDER — DIPHENHYDRAMINE HCL 25 MG
25 CAPSULE ORAL
Status: DISCONTINUED | OUTPATIENT
Start: 2023-08-21 | End: 2023-08-22 | Stop reason: HOSPADM

## 2023-08-21 RX ORDER — GADOBUTROL 604.72 MG/ML
10 INJECTION INTRAVENOUS ONCE
Status: COMPLETED | OUTPATIENT
Start: 2023-08-21 | End: 2023-08-21

## 2023-08-21 RX ORDER — CAFFEINE CITRATE 20 MG/ML
60 SOLUTION INTRAVENOUS
Status: DISCONTINUED | OUTPATIENT
Start: 2023-08-21 | End: 2023-08-22 | Stop reason: HOSPADM

## 2023-08-21 RX ORDER — METHYLPREDNISOLONE SODIUM SUCCINATE 125 MG/2ML
125 INJECTION, POWDER, LYOPHILIZED, FOR SOLUTION INTRAMUSCULAR; INTRAVENOUS
Status: DISCONTINUED | OUTPATIENT
Start: 2023-08-21 | End: 2023-08-22 | Stop reason: HOSPADM

## 2023-08-21 RX ORDER — ACYCLOVIR 200 MG/1
0-1 CAPSULE ORAL
Status: DISCONTINUED | OUTPATIENT
Start: 2023-08-21 | End: 2023-08-22 | Stop reason: HOSPADM

## 2023-08-21 RX ADMIN — REGADENOSON 0.4 MG: 0.08 INJECTION, SOLUTION INTRAVENOUS at 09:46

## 2023-08-21 RX ADMIN — DIAZEPAM 5 MG: 5 TABLET ORAL at 07:46

## 2023-08-21 RX ADMIN — GADOBUTROL 10 ML: 604.72 INJECTION INTRAVENOUS at 09:07

## 2023-08-21 RX ADMIN — AMINOPHYLLINE 100 MG: 25 INJECTION, SOLUTION INTRAVENOUS at 09:49

## 2023-08-22 LAB
ATRIAL RATE - MUSE: 94 BPM
ATRIAL RATE - MUSE: 95 BPM
DIASTOLIC BLOOD PRESSURE - MUSE: NORMAL MMHG
DIASTOLIC BLOOD PRESSURE - MUSE: NORMAL MMHG
INTERPRETATION ECG - MUSE: NORMAL
INTERPRETATION ECG - MUSE: NORMAL
P AXIS - MUSE: 60 DEGREES
P AXIS - MUSE: 64 DEGREES
PR INTERVAL - MUSE: 160 MS
PR INTERVAL - MUSE: 162 MS
QRS DURATION - MUSE: 92 MS
QRS DURATION - MUSE: 96 MS
QT - MUSE: 370 MS
QT - MUSE: 376 MS
QTC - MUSE: 462 MS
QTC - MUSE: 472 MS
R AXIS - MUSE: 33 DEGREES
R AXIS - MUSE: 44 DEGREES
SYSTOLIC BLOOD PRESSURE - MUSE: NORMAL MMHG
SYSTOLIC BLOOD PRESSURE - MUSE: NORMAL MMHG
T AXIS - MUSE: 40 DEGREES
T AXIS - MUSE: 41 DEGREES
VENTRICULAR RATE- MUSE: 94 BPM
VENTRICULAR RATE- MUSE: 95 BPM

## 2023-09-17 ENCOUNTER — ANCILLARY PROCEDURE (OUTPATIENT)
Dept: GENERAL RADIOLOGY | Facility: CLINIC | Age: 68
End: 2023-09-17
Attending: NURSE PRACTITIONER
Payer: COMMERCIAL

## 2023-09-17 ENCOUNTER — OFFICE VISIT (OUTPATIENT)
Dept: URGENT CARE | Facility: URGENT CARE | Age: 68
End: 2023-09-17
Payer: COMMERCIAL

## 2023-09-17 VITALS
OXYGEN SATURATION: 99 % | WEIGHT: 186.4 LBS | RESPIRATION RATE: 20 BRPM | SYSTOLIC BLOOD PRESSURE: 120 MMHG | HEART RATE: 98 BPM | DIASTOLIC BLOOD PRESSURE: 89 MMHG | TEMPERATURE: 97.6 F | BODY MASS INDEX: 27.51 KG/M2

## 2023-09-17 DIAGNOSIS — R05.1 ACUTE COUGH: ICD-10-CM

## 2023-09-17 DIAGNOSIS — J20.9 ACUTE BRONCHITIS, UNSPECIFIED ORGANISM: Primary | ICD-10-CM

## 2023-09-17 DIAGNOSIS — Z94.1 HEART REPLACED BY TRANSPLANT (H): ICD-10-CM

## 2023-09-17 DIAGNOSIS — Z86.39 HISTORY OF TYPE 2 DIABETES MELLITUS: ICD-10-CM

## 2023-09-17 DIAGNOSIS — D84.9 IMMUNOCOMPROMISED (H): ICD-10-CM

## 2023-09-17 DIAGNOSIS — I42.8 OTHER CARDIOMYOPATHIES (H): ICD-10-CM

## 2023-09-17 DIAGNOSIS — N18.32 STAGE 3B CHRONIC KIDNEY DISEASE (H): ICD-10-CM

## 2023-09-17 DIAGNOSIS — J44.9 CHRONIC OBSTRUCTIVE PULMONARY DISEASE, UNSPECIFIED COPD TYPE (H): ICD-10-CM

## 2023-09-17 PROBLEM — J45.909 ASTHMA: Status: ACTIVE | Noted: 2023-09-17

## 2023-09-17 PROBLEM — I10 HTN (HYPERTENSION): Status: ACTIVE | Noted: 2023-09-17

## 2023-09-17 PROBLEM — Z86.0101 HISTORY OF ADENOMATOUS POLYP OF COLON: Status: ACTIVE | Noted: 2023-09-17

## 2023-09-17 PROBLEM — R20.2 PARESTHESIAS: Status: ACTIVE | Noted: 2023-09-17

## 2023-09-17 PROBLEM — I21.4 NSTEMI (NON-ST ELEVATION MYOCARDIAL INFARCTION) (H): Status: ACTIVE | Noted: 2017-06-19

## 2023-09-17 PROBLEM — E29.1 HYPOGONADISM IN MALE: Status: ACTIVE | Noted: 2023-09-17

## 2023-09-17 PROCEDURE — 71046 X-RAY EXAM CHEST 2 VIEWS: CPT | Mod: TC | Performed by: RADIOLOGY

## 2023-09-17 PROCEDURE — 87635 SARS-COV-2 COVID-19 AMP PRB: CPT | Performed by: NURSE PRACTITIONER

## 2023-09-17 PROCEDURE — 99214 OFFICE O/P EST MOD 30 MIN: CPT | Performed by: NURSE PRACTITIONER

## 2023-09-17 RX ORDER — IPRATROPIUM BROMIDE AND ALBUTEROL SULFATE 2.5; .5 MG/3ML; MG/3ML
SOLUTION RESPIRATORY (INHALATION)
Qty: 90 ML | Refills: 0 | Status: SHIPPED | OUTPATIENT
Start: 2023-09-17 | End: 2024-01-01

## 2023-09-17 RX ORDER — PREDNISONE 20 MG/1
TABLET ORAL
Qty: 20 TABLET | Refills: 0 | Status: SHIPPED | OUTPATIENT
Start: 2023-09-17 | End: 2023-01-01

## 2023-09-17 NOTE — PROGRESS NOTES
Assessment & Plan      Diagnosis Comments   1. Acute bronchitis, unspecified organism  ipratropium - albuterol 0.5 mg/2.5 mg/3 mL (DUONEB) 0.5-2.5 (3) MG/3ML neb solution, predniSONE (DELTASONE) 20 MG tablet recommend treatment for acute bronchitis likely related to viral infection pending COVID test would recommend patient continue to self isolate until symptoms have improved.  We discussed red flags that would warrant emergent evaluation.  Reassuring radiology read of chest x-ray   Recommend treatment with prednisone for bronchial cough and inflammation.  Refill given for DuoNeb.      2. Acute cough  XR Chest 2 Views, ipratropium - albuterol 0.5 mg/2.5 mg/3 mL (DUONEB) 0.5-2.5 (3) MG/3ML neb solution, Symptomatic COVID-19 Virus (Coronavirus) by PCR Nose, predniSONE (DELTASONE) 20 MG tablet       3. Heart replaced by transplant (H)  XR Chest 2 Views, Symptomatic COVID-19 Virus (Coronavirus) by PCR Nose, predniSONE (DELTASONE) 20 MG tablet       4. Immunocompromised (H)  XR Chest 2 Views, Symptomatic COVID-19 Virus (Coronavirus) by PCR Nose, predniSONE (DELTASONE) 20 MG tablet       5. Chronic obstructive pulmonary disease, unspecified COPD type (H)        6. History of type 2 diabetes mellitus        7. Stage 3b chronic kidney disease (H)        8. Other cardiomyopathies (H)             Differential diagnosis include pneumonia and COVID.  Chest x-ray ordered to rule out pneumonia.  COVID test ordered at this visit. Prednisone ordered to help relief hacking cough, with yellowish-green mucus production, and SOB. Duoneb ordered to help loosen secretions and clear airway.   Due to history of heart transplant chest x-ray was ordered at this visit to rule out pneumonia.  Patient had no acute cardio pulmonary abnormalities shown on chest x-ray.  COVID test pending.  Patient is agreeable to seeking care in the ED if chest tightening and shortness of breath worsens with treatment.  Discussed the need for rest and hydration.   Patient verbalized understanding of th plan and is in agreement.  We will follow-up with primary with further concerns.    30 minutes spent on the date of the encounter doing chart review, review of outside records, review of test results, interpretation of tests, patient visit, and documentation       TIM [facility] CARE SUYAPA Fernandez is a 68 year old male who presents to clinic today for the following health issues: Upper respiratory infection.  Patient states that his symptoms started about a week ago and has gradually gotten worse.  Symptoms include hacking cough, yellowish-green mucus, shortness of breath, and chest tightness.  He denies fever, fatigue, decreased appetite,wheezing, headache and nasal congestion.   Chief Complaint   Patient presents with    URI     X4DAYS; no fever, SOB, coughing up green mucus     HPI    Patient with a complex medical history including heart transplant, type 2 diabetes, COPD and asthma, CKD. Presents to clinic with onset of symptoms about a week ago and states progressively getting worse.  Current symptoms include hacking cough, thick yellowish-green phlegm, shortness of breath and chest tightness.   He states he has been needing to use his inhaler slightly more than normal.  He has used nebulizer in past however states he is out of DuoNeb solution.  He states since his heart transplant his diabetes has been well managed he currently is not on any medication for this.  Does not monitor his glucose on a regular basis.              Review of Systems  Constitutional, HEENT, cardiovascular, pulmonary, gi and gu systems are negative, except as otherwise noted.      Objective    /89   Pulse 98   Temp 97.6  F (36.4  C) (Tympanic)   Resp 20   Wt 84.6 kg (186 lb 6.4 oz)   SpO2 99%   BMI 27.51 kg/m       Physical Exam   GENERAL: healthy, alert and no distress  NECK: no adenopathy, no asymmetry, masses, or scars  and thyroid normal to palpation  RESP: lungs clear to auscultation - no rales, rhonchi or wheezes. C/o SOB and chest tightness.   CV: History of heart transplant. Regular rate and rhythm, normal S1 S2, no S3 or S4, no murmur, click or rub, no peripheral edema and peripheral pulses strong  MS: no gross musculoskeletal defects noted, no edema  NEURO: Normal strength and tone, mentation intact and speech normal  PSYCH: mentation appears normal, affect normal/bright    Results for orders placed or performed in visit on 09/17/23   XR Chest 2 Views     Status: None    Narrative    EXAM: XR CHEST 2 VIEWS  LOCATION: [facility]  DATE: 9/17/2023    INDICATION:  Acute cough, Heart replaced by transplant (H), Immunocompromised (H)  COMPARISON: 6/7/2023      Impression    IMPRESSION: No acute cardiopulmonary abnormality. Unchanged chronic blunting of the right costophrenic angle. Status post heart transplant with median sternotomy wires and old pacemaker lead.

## 2023-09-18 LAB — SARS-COV-2 RNA RESP QL NAA+PROBE: NEGATIVE

## 2023-09-21 DIAGNOSIS — Z94.1 TRANSPLANTED HEART (H): Primary | ICD-10-CM

## 2023-09-25 ASSESSMENT — PATIENT HEALTH QUESTIONNAIRE - PHQ9
10. IF YOU CHECKED OFF ANY PROBLEMS, HOW DIFFICULT HAVE THESE PROBLEMS MADE IT FOR YOU TO DO YOUR WORK, TAKE CARE OF THINGS AT HOME, OR GET ALONG WITH OTHER PEOPLE: NOT DIFFICULT AT ALL
SUM OF ALL RESPONSES TO PHQ QUESTIONS 1-9: 2
SUM OF ALL RESPONSES TO PHQ QUESTIONS 1-9: 2

## 2023-09-26 ENCOUNTER — OFFICE VISIT (OUTPATIENT)
Dept: FAMILY MEDICINE | Facility: CLINIC | Age: 68
End: 2023-09-26
Payer: COMMERCIAL

## 2023-09-26 VITALS
SYSTOLIC BLOOD PRESSURE: 137 MMHG | OXYGEN SATURATION: 98 % | HEIGHT: 69 IN | RESPIRATION RATE: 26 BRPM | WEIGHT: 203.6 LBS | TEMPERATURE: 98.3 F | HEART RATE: 100 BPM | DIASTOLIC BLOOD PRESSURE: 87 MMHG | BODY MASS INDEX: 30.16 KG/M2

## 2023-09-26 DIAGNOSIS — J44.9 CHRONIC OBSTRUCTIVE PULMONARY DISEASE, UNSPECIFIED COPD TYPE (H): ICD-10-CM

## 2023-09-26 DIAGNOSIS — R73.01 IFG (IMPAIRED FASTING GLUCOSE): ICD-10-CM

## 2023-09-26 DIAGNOSIS — F33.9 MAJOR DEPRESSION, RECURRENT, CHRONIC (H): ICD-10-CM

## 2023-09-26 DIAGNOSIS — M15.9 GENERALIZED OSTEOARTHRITIS: ICD-10-CM

## 2023-09-26 DIAGNOSIS — M1A.00X0 GOUTY ARTHROPATHY, CHRONIC, WITHOUT TOPHI: ICD-10-CM

## 2023-09-26 DIAGNOSIS — Z23 NEED FOR PNEUMOCOCCAL 20-VALENT CONJUGATE VACCINATION: ICD-10-CM

## 2023-09-26 DIAGNOSIS — N18.32 STAGE 3B CHRONIC KIDNEY DISEASE (H): Primary | ICD-10-CM

## 2023-09-26 DIAGNOSIS — Z23 NEED FOR INFLUENZA VACCINATION: ICD-10-CM

## 2023-09-26 DIAGNOSIS — Z94.1 S/P ORTHOTOPIC HEART TRANSPLANT (H): ICD-10-CM

## 2023-09-26 DIAGNOSIS — E78.5 HYPERLIPIDEMIA LDL GOAL <70: ICD-10-CM

## 2023-09-26 PROBLEM — Z86.39 HISTORY OF TYPE 2 DIABETES MELLITUS: Status: RESOLVED | Noted: 2023-03-28 | Resolved: 2023-09-26

## 2023-09-26 PROCEDURE — 99214 OFFICE O/P EST MOD 30 MIN: CPT | Mod: 25 | Performed by: INTERNAL MEDICINE

## 2023-09-26 PROCEDURE — G0009 ADMIN PNEUMOCOCCAL VACCINE: HCPCS | Performed by: INTERNAL MEDICINE

## 2023-09-26 PROCEDURE — G0008 ADMIN INFLUENZA VIRUS VAC: HCPCS | Performed by: INTERNAL MEDICINE

## 2023-09-26 PROCEDURE — 90677 PCV20 VACCINE IM: CPT | Performed by: INTERNAL MEDICINE

## 2023-09-26 PROCEDURE — 90662 IIV NO PRSV INCREASED AG IM: CPT | Performed by: INTERNAL MEDICINE

## 2023-09-26 NOTE — NURSING NOTE
Prior to immunization administration, verified patients identity using patient s name and date of birth. Please see Immunization Activity for additional information.     Screening Questionnaire for Adult Immunization    Are you sick today?   No   Do you have allergies to medications, food, a vaccine component or latex?   No   Have you ever had a serious reaction after receiving a vaccination?   No   Do you have a long-term health problem with heart, lung, kidney, or metabolic disease (e.g., diabetes), asthma, a blood disorder, no spleen, complement component deficiency, a cochlear implant, or a spinal fluid leak?  Are you on long-term aspirin therapy?   No   Do you have cancer, leukemia, HIV/AIDS, or any other immune system problem?   No   Do you have a parent, brother, or sister with an immune system problem?   No   In the past 3 months, have you taken medications that affect  your immune system, such as prednisone, other steroids, or anticancer drugs; drugs for the treatment of rheumatoid arthritis, Crohn s disease, or psoriasis; or have you had radiation treatments?   No   Have you had a seizure, or a brain or other nervous system problem?   No   During the past year, have you received a transfusion of blood or blood    products, or been given immune (gamma) globulin or antiviral drug?   No   For women: Are you pregnant or is there a chance you could become       pregnant during the next month?   No   Have you received any vaccinations in the past 4 weeks?   No     Immunization questionnaire answers were all negative.      Patient instructed to remain in clinic for 15 minutes afterwards, and to report any adverse reactions.     Screening performed by Liyah Jama MA on 9/26/2023 at 11:26 AM.

## 2023-09-26 NOTE — COMMUNITY RESOURCES LIST (ENGLISH)
09/26/2023   [facility] [employer]  N/A  For questions about this resource list or additional care needs, please contact your primary care clinic or care manager.  Phone: [phone]   Email: N/A   Address: [address] [place] [id number]   Hours: N/A        Financial Stability       Utility payment assistance  1  [employer] [facility]) - Low Income Home Energy Assistance Program (LIHEAP) Distance: 5.93 miles      In-Person   [address] [place] [address]  Language: English  Hours: Mon 8:00 AM - 4:30 PM , Tue 8:00 AM - 7:00 PM , Wed 8:00 AM - 4:30 PM , Thu 8:00 AM - 7:00 PM , Fri 8:00 AM - 4:30 PM  Fees: Free   Phone: (392) 145-1655 Email: info@[facility] Website: https://[facility]/     2  [facility] - Emergency Assistance Program (EAP) - Utilities Distance: 6.14 miles      In-Person, Phone/Virtual   [address] [place] [address]  Language: American Sign Language, English  Hours: Mon - Fri 8:00 AM - 4:00 PM  Fees: Free   Phone: (612) 869-2259 Email: lilia@[employer]. Website: https://www.[employer]/residents#human-services          Important Numbers & Websites       Emergency Services   911  [facility] Services   311  Poison Control   (408) 452-6700  Suicide Prevention Lifeline   (375) 695-7562 (TALK)  Child Abuse Hotline   (797) 549-3989 (4-A-Child)  Sexual Assault Hotline   (327) 792-6748 (HOPE)  National Runaway Safeline   (796) 688-1338 (RUNAWAY)  All-Options Talkline   (335) 851-5726  Substance Abuse Referral   (688) 340-7730 (HELP)

## 2023-09-26 NOTE — COMMUNITY RESOURCES LIST (ENGLISH)
09/26/2023   [facility] [employer]  N/A  For questions about this resource list or additional care needs, please contact your primary care clinic or care manager.  Phone: [phone]   Email: N/A   Address: [address] [place] [address]   Hours: N/A        Financial Stability       Utility payment assistance  1  [employer] [facility] ([facility]) - Low Income Home Energy Assistance Program (LIHEAP) Distance: 5.93 miles      In-Person   [address] [place] [address]  Language: English  Hours: Mon 8:00 AM - 4:30 PM , Tue 8:00 AM - 7:00 PM , Wed 8:00 AM - 4:30 PM , Thu 8:00 AM - 7:00 PM , Fri 8:00 AM - 4:30 PM  Fees: Free   Phone: (673) 709-9556 Email: info@[facility] Website: https://[facility]/     2  [facility] - Emergency Assistance Program (EAP) - Utilities Distance: 6.14 miles      In-Person, Phone/Virtual   [address] [place] [address]  Language: American Sign Language, English  Hours: Mon - Fri 8:00 AM - 4:00 PM  Fees: Free   Phone: (637) 949-1297 Email: lilia@[employer]. Website: https://www.[email or web address]/residents#human-services          Important Numbers & Websites       Emergency Services   911  [facility] Services   311  Poison Control   (551) 526-6243  Suicide Prevention Lifeline   (910) 391-4565 (TALK)  Child Abuse Hotline   (140) 517-1365 (4-A-Child)  Sexual Assault Hotline   (605) 743-7594 (HOPE)  National Runaway Safeline   (678) 664-1360 (RUNAWAY)  All-Options Talkline   (664) 269-7020  Substance Abuse Referral   (470) 774-8716 (HELP)     Medical Necessity Clause: This procedure was medically necessary because the lesion that was treated was:

## 2023-09-26 NOTE — PROGRESS NOTES
"  Assessment & Plan     1.  Stage IIIb chronic kidney disease remaining stable.  Has regular lab work to transplant center.  2.  Chronic obstructive pulmonary disease stable.  Will provide Pneumovax and flu vaccine today.  Continue with Singulair and albuterol as needed.  3.  Major depression recurrent chronic stable with bupropion which she will continue.  4.  Status post orthotopic heart transplant.  Patient antirejection medication doing well.  5.  Chronic gouty arthropathy.  No recent flareups.  Patient continues to be on allopurinol 3 mg a day.  6.  Hyperlipidemia been treated with rosuvastatin.  Cholesterol 119, LDL 36 and HDL 74 on 5/23/2023.  Continue same.  7.  Impaired fasting glucose.  I will have A1c and a microalbumin checked in December when he is another lab draw.  He has a previous history of diabetes type 2 which resolved following weight loss as a result of gastric bypass.  8.  Pneumovax 20 Valent vaccine provided today.  9.  Influenza vaccine provided today.  10.  Patient advised to get new COVID-vaccine and RSV vaccine as soon as it becomes available.  11.  Generalized osteoarthritis and some chronic pain for which he is using gabapentin and occasionally using tramadol on as needed basis.  Strongly recommend minimal use of tramadol.       BMI:   Estimated body mass index is 29.81 kg/m  as calculated from the following:    Height as of this encounter: 1.76 m (5' 9.29\").    Weight as of this encounter: 92.4 kg (203 lb 9.6 oz).         Ricardo Gómez MD  [facility] KOLTON Fernandez is a 68 year old, presenting for the following health issues:  No chief complaint on file.        9/26/2023    10:43 AM   Additional Questions   Roomed by Liyah   Accompanied by Self         9/26/2023    10:43 AM   Patient Reported Additional Medications   Patient reports taking the following new medications None       Arthritis    History of Present Illness       Reason for visit:  Follow " Mica consumes 0 sweetened beverage(s) daily.He exercises with enough effort to increase his heart rate 10 to 19 minutes per day.  He exercises with enough effort to increase his heart rate 3 or less days per week.   He is taking medications regularly.         Concern - joint pain  Onset: years  Description: sharp and dull pain  Intensity: moderate  Progression of Symptoms:  worsening  Accompanying Signs & Symptoms: none  Previous history of similar problem: none  Precipitating factors:        Worsened by: none  Alleviating factors:        Improved by: tramadol   Therapies tried and outcome: None    Patient comes in for follow-up multiple health issues which include s/p heart transplant, COPD, chronic kidney disease, hyperlipidemia, impaired fasting glucose with previous history of type 2 diabetes mellitus prior to gastric bypass.  Can planes of joint pain.  He has apparently generalized osteoarthritis.  He is taking gabapentin for chronic pain and also on a as needed basis using tramadol.  He feels his breathing is fine right now.  No dizziness or lightheadedness.  Mood is stable.  Takes his medication as prescribed.    Review of Systems   Musculoskeletal:  Positive for arthritis.      Constitutional, HEENT, cardiovascular, pulmonary, GI, , musculoskeletal, neuro, skin, endocrine and psych systems are negative, except as otherwise noted.      Objective    There were no vitals taken for this visit.  There is no height or weight on file to calculate BMI.  Physical Exam   GENERAL: healthy, alert and no distress  NECK: no adenopathy, no asymmetry, masses, or scars and thyroid normal to palpation  RESP: lungs clear to auscultation - no rales, rhonchi or wheezes  CV: regular rate and rhythm, normal S1 S2, no S3 or S4, no murmur, click or rub, no peripheral edema and peripheral pulses strong  ABDOMEN: soft, nontender, no hepatosplenomegaly, no masses and bowel sounds normal  MS: no gross musculoskeletal defects noted, no  edema

## 2023-09-30 DIAGNOSIS — J44.9 CHRONIC OBSTRUCTIVE PULMONARY DISEASE, UNSPECIFIED COPD TYPE (H): ICD-10-CM

## 2023-10-04 RX ORDER — ALBUTEROL SULFATE 90 UG/1
2 AEROSOL, METERED RESPIRATORY (INHALATION) EVERY 4 HOURS PRN
Qty: 40.2 G | Refills: 2 | Status: SHIPPED | OUTPATIENT
Start: 2023-10-04 | End: 2024-01-01

## 2023-11-21 NOTE — PROGRESS NOTES
Subjective   Vista is a 68 year old, presenting for the following health issues:  Cough and Shortness of Breath    HPI   Acute Illness  Acute illness concerns:   Onset/Duration: 4days  Symptoms:  Fever: No  Chills/Sweats: YES  Headache (location?): No  Sinus Pressure: No  Conjunctivitis:  No  Ear Pain: no  Rhinorrhea: No  Congestion: No  Sore Throat: No  Cough: YES-productive of yellow sputum, with shortness of breath  Wheeze: No  Decreased Appetite: No  Nausea: YES  Vomiting: No  Diarrhea: No  Dysuria/Freq.: No  Dysuria or Hematuria: No  Fatigue/Achiness: No  Sick/Strep Exposure: No but has known hx of asthma.  S/p heart transplant.  Reports chronic changes to his smell but no loss of taste  Therapies tried and outcome: rest,fluids,inhaler with minimal relief    Patient Active Problem List   Diagnosis    Stage 3b chronic kidney disease (H)    H/O gastric bypass    Vitamin B12 deficiency (non anemic)    Iron deficiency anemia    Bilateral carpal tunnel syndrome    Rotator cuff tear arthropathy of both shoulders    COPD (chronic obstructive pulmonary disease) (H)    Major depression, recurrent, chronic (H24)    Gouty arthropathy, chronic, without tophi    Need for prophylactic antibiotic    S/P orthotopic heart transplant (H)    Abnormal CT scan of lung    Generalized muscle weakness    Pulmonary cavitary lesion    Encounter for monitoring tacrolimus therapy    Hx of aspergillosis    Leigh-Barr virus viremia    Mild anemia    Status post coronary angiogram    Hyperlipidemia LDL goal <70    Peripheral polyneuropathy    Transplanted heart (H)    Thyroid disorder screening    Lipid screening    Prostate cancer screening    Asthma    History of adenomatous polyp of colon    HTN (hypertension)    Hypogonadism in male    NSTEMI (non-ST elevation myocardial infarction) (H)    Paresthesias    Idiopathic cardiomyopathy (H)    IFG (impaired fasting glucose)    Generalized osteoarthritis     Current Outpatient Medications    Medication    acetaminophen (TYLENOL) 325 MG tablet    albuterol (PROAIR HFA/PROVENTIL HFA/VENTOLIN HFA) 108 (90 Base) MCG/ACT inhaler    allopurinol (ZYLOPRIM) 300 MG tablet    anakinra (KINERET) 100 MG/0.67ML SOSY injection    aspirin (ASA) 81 MG chewable tablet    buPROPion (WELLBUTRIN) 75 MG tablet    calcium citrate-vitamin D (CITRACAL) 315-250 MG-UNIT TABS per tablet    diphenhydrAMINE (BENADRYL) 25 MG tablet    gabapentin (NEURONTIN) 300 MG capsule    ipratropium - albuterol 0.5 mg/2.5 mg/3 mL (DUONEB) 0.5-2.5 (3) MG/3ML neb solution    Melatonin 10 MG CAPS    montelukast (SINGULAIR) 10 MG tablet    mycophenolate (GENERIC EQUIVALENT) 250 MG capsule    predniSONE (DELTASONE) 20 MG tablet    rosuvastatin (CRESTOR) 10 MG tablet    tacrolimus (GENERIC EQUIVALENT) 0.5 MG capsule    tacrolimus (GENERIC) 1 MG capsule    traMADol (ULTRAM) 50 MG tablet     No current facility-administered medications for this visit.        Allergies   Allergen Reactions    Grass Shortness Of Breath    Ace Inhibitors Cough    Cats     Dust Mites Other (See Comments)     Asthma    Mold Other (See Comments)     Asthma    Penicillins Other (See Comments)     Unknown - childhood exposure    Tolerated Zosyn 9/18-9/20/2020    Per patient report he has tolerated amoxicillin    Sulfa Antibiotics Other (See Comments) and Unknown     Unknown childhood reaction       Review of Systems   Constitutional:  Positive for chills. Negative for fatigue and fever.   HENT:  Negative for congestion, ear discharge, ear pain, hearing loss, rhinorrhea, sinus pressure, sinus pain and sore throat.    Respiratory:  Positive for cough and shortness of breath. Negative for chest tightness and wheezing.    Cardiovascular: Negative.  Negative for chest pain, palpitations and peripheral edema.   Gastrointestinal:  Positive for nausea.   All other systems reviewed and are negative.           Objective    /79   Pulse 96   Temp 97.4  F (36.3  C) (Tympanic)    Resp 18   Wt 82.6 kg (182 lb)   SpO2 97%   BMI 26.65 kg/m    Body mass index is 26.65 kg/m .  Physical Exam  Vitals and nursing note reviewed.   Constitutional:       General: He is not in acute distress.     Appearance: Normal appearance. He is normal weight. He is not ill-appearing.   HENT:      Head: Normocephalic and atraumatic.      Ears:      Comments: TMs are intact without any erythema or bulging bilaterally.  Airway is patent.     Nose: Nose normal.      Mouth/Throat:      Lips: Pink.      Mouth: Mucous membranes are moist.      Pharynx: Oropharynx is clear. Uvula midline. No pharyngeal swelling, oropharyngeal exudate, posterior oropharyngeal erythema or uvula swelling.      Tonsils: No tonsillar exudate or tonsillar abscesses.   Eyes:      General: No scleral icterus.     Conjunctiva/sclera: Conjunctivae normal.      Pupils: Pupils are equal, round, and reactive to light.   Neck:      Thyroid: No thyromegaly.   Cardiovascular:      Rate and Rhythm: Normal rate and regular rhythm.      Pulses: Normal pulses.      Heart sounds: Normal heart sounds, S1 normal and S2 normal. No murmur heard.     No friction rub. No gallop.   Pulmonary:      Effort: Pulmonary effort is normal. No tachypnea, accessory muscle usage, respiratory distress or retractions.      Breath sounds: Normal breath sounds and air entry. No stridor. No decreased breath sounds, wheezing, rhonchi or rales.   Musculoskeletal:      Cervical back: Normal range of motion and neck supple.   Lymphadenopathy:      Cervical: No cervical adenopathy.   Skin:     General: Skin is warm and dry.      Findings: No rash.   Neurological:      Mental Status: He is alert and oriented to person, place, and time.   Psychiatric:         Mood and Affect: Mood normal.         Behavior: Behavior normal.         Thought Content: Thought content normal.         Judgment: Judgment normal.          Assessment/Plan:  Mild intermittent asthmatic bronchitis without  complication:   Will treat with zithromax X5days, [id number], tessalon perles, and continue with his albuterol inh as needed for symptoms.  Will also check covid as well.  Recommend treatment with rest, fluids and chicken soup. Tylenol/ibuprofen prn fever/pain.  Recheck in clinic if symptoms worsen or if symptoms do not improve.  To the ER if she develops hemoptysis, chest pain, fevers>102, worsening shortness of breath/wheezing.    -     azithromycin (ZITHROMAX) 250 MG tablet; 2 tablets the first day, then 1 tablet daily for the next 4 days  -     predniSONE (DELTASONE) 20 MG tablet; Take 2 tablets (40 mg) by mouth daily for 7 days  -     benzonatate (TESSALON) 200 MG capsule; Take 1 capsule (200 mg) by mouth 3 times daily as needed for cough  -     Symptomatic COVID-19 Virus (Coronavirus) by PCR    Immunocompromised patient (H24)        Neli Spain PA-C

## 2023-11-27 NOTE — TELEPHONE ENCOUNTER
Patient Quality Outreach    Patient is due for the following:   Physical Annual Wellness Visit    Next Steps:   Patient has upcoming appointment, these items will be addressed at that time.  Appointment scheduled for 3/27/24    Type of outreach:    Did not contact    Next Steps:  Reach out within 90 days via  no need; has appt. .    Max number of attempts reached:  has appointment    Questions for provider review:    None           Diane L. Schoenherr, RN

## 2023-12-11 NOTE — TELEPHONE ENCOUNTER
Left Voicemail (1st Attempt) and Sent Mychart (1st Attempt) for the patient to call back and reschedule the following if they would still like to see Dr. Montgomery rather than Dr. Coombs. The appt w/ Dr. Coombs has already been scheduled and nothing needs to be changed if the pt is OK w/ this schedule.    Appointment type: Return Heart Transplant  Provider: Dr. Coombs  Return date: 12/14/23  Specialty phone number: [phone] option 1  Additional appointment(s) needed: Appts are already scheduled. Only reschedule if pt prefers Dr. Montgomery.   Additonal Notes: 12/11 LVM and MYC to inform pt that Dr. Montgomery appt has been rescheduled to Dr. Coombs and that nothing else has changed. Told pt to call back if need to reschedule. MB

## 2024-01-01 ENCOUNTER — ANCILLARY PROCEDURE (OUTPATIENT)
Dept: ULTRASOUND IMAGING | Facility: CLINIC | Age: 69
End: 2024-01-01
Attending: SURGERY
Payer: COMMERCIAL

## 2024-01-01 ENCOUNTER — DOCUMENTATION ONLY (OUTPATIENT)
Dept: ANTICOAGULATION | Facility: CLINIC | Age: 69
End: 2024-01-01
Payer: COMMERCIAL

## 2024-01-01 ENCOUNTER — TELEPHONE (OUTPATIENT)
Dept: GASTROENTEROLOGY | Facility: CLINIC | Age: 69
End: 2024-01-01
Payer: COMMERCIAL

## 2024-01-01 ENCOUNTER — OFFICE VISIT (OUTPATIENT)
Dept: CARDIOLOGY | Facility: CLINIC | Age: 69
End: 2024-01-01
Attending: INTERNAL MEDICINE
Payer: COMMERCIAL

## 2024-01-01 ENCOUNTER — LAB (OUTPATIENT)
Dept: LAB | Facility: CLINIC | Age: 69
End: 2024-01-01
Payer: COMMERCIAL

## 2024-01-01 ENCOUNTER — PATIENT OUTREACH (OUTPATIENT)
Dept: NEPHROLOGY | Facility: CLINIC | Age: 69
End: 2024-01-01

## 2024-01-01 ENCOUNTER — ORDERS ONLY (AUTO-RELEASED) (OUTPATIENT)
Dept: TRANSPLANT | Facility: CLINIC | Age: 69
End: 2024-01-01
Payer: COMMERCIAL

## 2024-01-01 ENCOUNTER — ANTICOAGULATION THERAPY VISIT (OUTPATIENT)
Dept: ANTICOAGULATION | Facility: CLINIC | Age: 69
End: 2024-01-01
Payer: COMMERCIAL

## 2024-01-01 ENCOUNTER — PATIENT OUTREACH (OUTPATIENT)
Dept: NEPHROLOGY | Facility: CLINIC | Age: 69
End: 2024-01-01
Payer: COMMERCIAL

## 2024-01-01 ENCOUNTER — TELEPHONE (OUTPATIENT)
Dept: TRANSPLANT | Facility: CLINIC | Age: 69
End: 2024-01-01
Payer: COMMERCIAL

## 2024-01-01 ENCOUNTER — MYC MEDICAL ADVICE (OUTPATIENT)
Dept: FAMILY MEDICINE | Facility: CLINIC | Age: 69
End: 2024-01-01
Payer: COMMERCIAL

## 2024-01-01 ENCOUNTER — APPOINTMENT (OUTPATIENT)
Dept: SPEECH THERAPY | Facility: CLINIC | Age: 69
DRG: 314 | End: 2024-01-01
Payer: COMMERCIAL

## 2024-01-01 ENCOUNTER — APPOINTMENT (OUTPATIENT)
Dept: NUCLEAR MEDICINE | Facility: CLINIC | Age: 69
DRG: 176 | End: 2024-01-01
Attending: EMERGENCY MEDICINE
Payer: COMMERCIAL

## 2024-01-01 ENCOUNTER — OFFICE VISIT (OUTPATIENT)
Dept: FAMILY MEDICINE | Facility: CLINIC | Age: 69
End: 2024-01-01
Attending: INTERNAL MEDICINE
Payer: COMMERCIAL

## 2024-01-01 ENCOUNTER — TRANSFERRED RECORDS (OUTPATIENT)
Dept: HEALTH INFORMATION MANAGEMENT | Facility: CLINIC | Age: 69
End: 2024-01-01
Payer: COMMERCIAL

## 2024-01-01 ENCOUNTER — APPOINTMENT (OUTPATIENT)
Dept: GENERAL RADIOLOGY | Facility: CLINIC | Age: 69
DRG: 335 | End: 2024-01-01
Payer: COMMERCIAL

## 2024-01-01 ENCOUNTER — APPOINTMENT (OUTPATIENT)
Dept: OCCUPATIONAL THERAPY | Facility: CLINIC | Age: 69
DRG: 176 | End: 2024-01-01
Payer: COMMERCIAL

## 2024-01-01 ENCOUNTER — ANTICOAGULATION THERAPY VISIT (OUTPATIENT)
Dept: ANTICOAGULATION | Facility: CLINIC | Age: 69
End: 2024-01-01

## 2024-01-01 ENCOUNTER — TELEPHONE (OUTPATIENT)
Dept: TRANSPLANT | Facility: CLINIC | Age: 69
End: 2024-01-01

## 2024-01-01 ENCOUNTER — APPOINTMENT (OUTPATIENT)
Dept: CARDIOLOGY | Facility: CLINIC | Age: 69
DRG: 335 | End: 2024-01-01
Payer: COMMERCIAL

## 2024-01-01 ENCOUNTER — PATIENT OUTREACH (OUTPATIENT)
Dept: FAMILY MEDICINE | Facility: CLINIC | Age: 69
End: 2024-01-01
Payer: COMMERCIAL

## 2024-01-01 ENCOUNTER — APPOINTMENT (OUTPATIENT)
Dept: PHYSICAL THERAPY | Facility: CLINIC | Age: 69
DRG: 335 | End: 2024-01-01
Payer: COMMERCIAL

## 2024-01-01 ENCOUNTER — HOSPITAL ENCOUNTER (INPATIENT)
Facility: CLINIC | Age: 69
LOS: 9 days | Discharge: HOME | DRG: 176 | End: 2024-05-29
Attending: EMERGENCY MEDICINE | Admitting: INTERNAL MEDICINE
Payer: COMMERCIAL

## 2024-01-01 ENCOUNTER — HOSPITAL ENCOUNTER (OUTPATIENT)
Facility: CLINIC | Age: 69
Discharge: HOME | End: 2024-09-13
Attending: STUDENT IN AN ORGANIZED HEALTH CARE EDUCATION/TRAINING PROGRAM | Admitting: PHYSICIAN ASSISTANT
Payer: COMMERCIAL

## 2024-01-01 ENCOUNTER — HOSPITAL ENCOUNTER (INPATIENT)
Facility: CLINIC | Age: 69
LOS: 5 days | Discharge: HOME | DRG: 314 | End: 2024-08-28
Attending: EMERGENCY MEDICINE | Admitting: STUDENT IN AN ORGANIZED HEALTH CARE EDUCATION/TRAINING PROGRAM
Payer: COMMERCIAL

## 2024-01-01 ENCOUNTER — APPOINTMENT (OUTPATIENT)
Dept: TRANSPLANT | Facility: CLINIC | Age: 69
End: 2024-01-01
Attending: NURSE PRACTITIONER
Payer: COMMERCIAL

## 2024-01-01 ENCOUNTER — TELEPHONE (OUTPATIENT)
Dept: UROLOGY | Facility: CLINIC | Age: 69
End: 2024-01-01

## 2024-01-01 ENCOUNTER — APPOINTMENT (OUTPATIENT)
Dept: GENERAL RADIOLOGY | Facility: CLINIC | Age: 69
DRG: 314 | End: 2024-01-01
Payer: COMMERCIAL

## 2024-01-01 ENCOUNTER — PREP FOR PROCEDURE (OUTPATIENT)
Dept: TRANSPLANT | Facility: CLINIC | Age: 69
End: 2024-01-01

## 2024-01-01 ENCOUNTER — ANCILLARY PROCEDURE (OUTPATIENT)
Dept: GENERAL RADIOLOGY | Facility: CLINIC | Age: 69
End: 2024-01-01
Attending: NURSE PRACTITIONER
Payer: COMMERCIAL

## 2024-01-01 ENCOUNTER — ALLIED HEALTH/NURSE VISIT (OUTPATIENT)
Dept: ENDOCRINOLOGY | Facility: CLINIC | Age: 69
End: 2024-01-01
Payer: COMMERCIAL

## 2024-01-01 ENCOUNTER — OFFICE VISIT (OUTPATIENT)
Dept: UROLOGY | Facility: CLINIC | Age: 69
End: 2024-01-01
Payer: COMMERCIAL

## 2024-01-01 ENCOUNTER — LAB (OUTPATIENT)
Dept: LAB | Facility: CLINIC | Age: 69
End: 2024-01-01
Attending: NURSE PRACTITIONER
Payer: COMMERCIAL

## 2024-01-01 ENCOUNTER — APPOINTMENT (OUTPATIENT)
Dept: ULTRASOUND IMAGING | Facility: CLINIC | Age: 69
DRG: 176 | End: 2024-01-01
Attending: EMERGENCY MEDICINE
Payer: COMMERCIAL

## 2024-01-01 ENCOUNTER — TEAM CONFERENCE (OUTPATIENT)
Dept: TRANSPLANT | Facility: CLINIC | Age: 69
End: 2024-01-01
Payer: COMMERCIAL

## 2024-01-01 ENCOUNTER — APPOINTMENT (OUTPATIENT)
Dept: GENERAL RADIOLOGY | Facility: CLINIC | Age: 69
End: 2024-01-01
Attending: FAMILY MEDICINE
Payer: COMMERCIAL

## 2024-01-01 ENCOUNTER — APPOINTMENT (OUTPATIENT)
Dept: CT IMAGING | Facility: CLINIC | Age: 69
End: 2024-01-01
Attending: FAMILY MEDICINE
Payer: COMMERCIAL

## 2024-01-01 ENCOUNTER — HOSPITAL ENCOUNTER (EMERGENCY)
Facility: CLINIC | Age: 69
Discharge: HOME | End: 2024-07-05
Attending: FAMILY MEDICINE | Admitting: FAMILY MEDICINE
Payer: COMMERCIAL

## 2024-01-01 ENCOUNTER — HOSPITAL ENCOUNTER (OUTPATIENT)
Facility: CLINIC | Age: 69
Discharge: HOME | End: 2024-04-18
Attending: INTERNAL MEDICINE | Admitting: INTERNAL MEDICINE
Payer: COMMERCIAL

## 2024-01-01 ENCOUNTER — NURSE TRIAGE (OUTPATIENT)
Dept: NURSING | Facility: CLINIC | Age: 69
End: 2024-01-01

## 2024-01-01 ENCOUNTER — HOSPITAL ENCOUNTER (INPATIENT)
Facility: CLINIC | Age: 69
LOS: 6 days | Discharge: HOME | DRG: 335 | End: 2024-01-09
Attending: FAMILY MEDICINE | Admitting: SURGERY
Payer: COMMERCIAL

## 2024-01-01 ENCOUNTER — TELEPHONE (OUTPATIENT)
Dept: NEPHROLOGY | Facility: CLINIC | Age: 69
End: 2024-01-01
Payer: COMMERCIAL

## 2024-01-01 ENCOUNTER — HEALTH MAINTENANCE LETTER (OUTPATIENT)
Age: 69
End: 2024-01-01

## 2024-01-01 ENCOUNTER — ANESTHESIA EVENT (OUTPATIENT)
Dept: SURGERY | Facility: CLINIC | Age: 69
DRG: 335 | End: 2024-01-01
Payer: COMMERCIAL

## 2024-01-01 ENCOUNTER — APPOINTMENT (OUTPATIENT)
Dept: ULTRASOUND IMAGING | Facility: CLINIC | Age: 69
DRG: 176 | End: 2024-01-01
Attending: PHYSICIAN ASSISTANT
Payer: COMMERCIAL

## 2024-01-01 ENCOUNTER — APPOINTMENT (OUTPATIENT)
Dept: CT IMAGING | Facility: CLINIC | Age: 69
DRG: 335 | End: 2024-01-01
Attending: PHYSICIAN ASSISTANT
Payer: COMMERCIAL

## 2024-01-01 ENCOUNTER — TELEPHONE (OUTPATIENT)
Dept: ANTICOAGULATION | Facility: CLINIC | Age: 69
End: 2024-01-01
Payer: COMMERCIAL

## 2024-01-01 ENCOUNTER — REFERRAL (OUTPATIENT)
Dept: TRANSPLANT | Facility: CLINIC | Age: 69
End: 2024-01-01

## 2024-01-01 ENCOUNTER — APPOINTMENT (OUTPATIENT)
Dept: ULTRASOUND IMAGING | Facility: CLINIC | Age: 69
DRG: 335 | End: 2024-01-01
Attending: PHYSICIAN ASSISTANT
Payer: COMMERCIAL

## 2024-01-01 ENCOUNTER — APPOINTMENT (OUTPATIENT)
Dept: GENERAL RADIOLOGY | Facility: CLINIC | Age: 69
DRG: 176 | End: 2024-01-01
Attending: EMERGENCY MEDICINE
Payer: COMMERCIAL

## 2024-01-01 ENCOUNTER — APPOINTMENT (OUTPATIENT)
Dept: OCCUPATIONAL THERAPY | Facility: CLINIC | Age: 69
DRG: 335 | End: 2024-01-01
Payer: COMMERCIAL

## 2024-01-01 ENCOUNTER — OFFICE VISIT (OUTPATIENT)
Dept: TRANSPLANT | Facility: CLINIC | Age: 69
End: 2024-01-01
Attending: SURGERY
Payer: COMMERCIAL

## 2024-01-01 ENCOUNTER — OFFICE VISIT (OUTPATIENT)
Dept: URGENT CARE | Facility: URGENT CARE | Age: 69
End: 2024-01-01
Payer: COMMERCIAL

## 2024-01-01 ENCOUNTER — ALLIED HEALTH/NURSE VISIT (OUTPATIENT)
Dept: CARDIOLOGY | Facility: CLINIC | Age: 69
End: 2024-01-01
Payer: COMMERCIAL

## 2024-01-01 ENCOUNTER — HOSPITAL ENCOUNTER (OUTPATIENT)
Facility: CLINIC | Age: 69
End: 2024-01-01
Attending: SURGERY | Admitting: SURGERY
Payer: COMMERCIAL

## 2024-01-01 ENCOUNTER — OFFICE VISIT (OUTPATIENT)
Dept: NEPHROLOGY | Facility: CLINIC | Age: 69
End: 2024-01-01
Payer: COMMERCIAL

## 2024-01-01 ENCOUNTER — APPOINTMENT (OUTPATIENT)
Dept: INTERVENTIONAL RADIOLOGY/VASCULAR | Facility: CLINIC | Age: 69
End: 2024-01-01
Payer: COMMERCIAL

## 2024-01-01 ENCOUNTER — APPOINTMENT (OUTPATIENT)
Dept: CARDIOLOGY | Facility: CLINIC | Age: 69
DRG: 176 | End: 2024-01-01
Attending: STUDENT IN AN ORGANIZED HEALTH CARE EDUCATION/TRAINING PROGRAM
Payer: COMMERCIAL

## 2024-01-01 ENCOUNTER — APPOINTMENT (OUTPATIENT)
Dept: CT IMAGING | Facility: CLINIC | Age: 69
DRG: 176 | End: 2024-01-01
Attending: PHYSICIAN ASSISTANT
Payer: COMMERCIAL

## 2024-01-01 ENCOUNTER — APPOINTMENT (OUTPATIENT)
Dept: MEDSURG UNIT | Facility: CLINIC | Age: 69
End: 2024-01-01
Attending: STUDENT IN AN ORGANIZED HEALTH CARE EDUCATION/TRAINING PROGRAM
Payer: COMMERCIAL

## 2024-01-01 ENCOUNTER — TELEPHONE (OUTPATIENT)
Dept: FAMILY MEDICINE | Facility: CLINIC | Age: 69
End: 2024-01-01
Payer: COMMERCIAL

## 2024-01-01 ENCOUNTER — APPOINTMENT (OUTPATIENT)
Dept: GENERAL RADIOLOGY | Facility: CLINIC | Age: 69
DRG: 314 | End: 2024-01-01
Attending: STUDENT IN AN ORGANIZED HEALTH CARE EDUCATION/TRAINING PROGRAM
Payer: COMMERCIAL

## 2024-01-01 ENCOUNTER — APPOINTMENT (OUTPATIENT)
Dept: MRI IMAGING | Facility: CLINIC | Age: 69
DRG: 176 | End: 2024-01-01
Attending: PHYSICIAN ASSISTANT
Payer: COMMERCIAL

## 2024-01-01 ENCOUNTER — TELEPHONE (OUTPATIENT)
Dept: CARDIOLOGY | Facility: CLINIC | Age: 69
End: 2024-01-01
Payer: COMMERCIAL

## 2024-01-01 ENCOUNTER — DOCUMENTATION ONLY (OUTPATIENT)
Dept: TRANSPLANT | Facility: CLINIC | Age: 69
End: 2024-01-01
Payer: COMMERCIAL

## 2024-01-01 ENCOUNTER — HOSPITAL ENCOUNTER (OUTPATIENT)
Age: 69
End: 2024-01-01
Attending: STUDENT IN AN ORGANIZED HEALTH CARE EDUCATION/TRAINING PROGRAM
Payer: COMMERCIAL

## 2024-01-01 ENCOUNTER — APPOINTMENT (OUTPATIENT)
Dept: CARDIOLOGY | Facility: CLINIC | Age: 69
DRG: 176 | End: 2024-01-01
Attending: EMERGENCY MEDICINE
Payer: COMMERCIAL

## 2024-01-01 ENCOUNTER — APPOINTMENT (OUTPATIENT)
Dept: CT IMAGING | Facility: CLINIC | Age: 69
DRG: 314 | End: 2024-01-01
Payer: COMMERCIAL

## 2024-01-01 ENCOUNTER — DOCUMENTATION ONLY (OUTPATIENT)
Dept: NEPHROLOGY | Facility: CLINIC | Age: 69
End: 2024-01-01

## 2024-01-01 ENCOUNTER — TRANSFERRED RECORDS (OUTPATIENT)
Dept: MULTI SPECIALTY CLINIC | Facility: CLINIC | Age: 69
End: 2024-01-01

## 2024-01-01 ENCOUNTER — TELEPHONE (OUTPATIENT)
Dept: SURGERY | Facility: CLINIC | Age: 69
End: 2024-01-01

## 2024-01-01 ENCOUNTER — APPOINTMENT (OUTPATIENT)
Dept: CARDIOLOGY | Facility: CLINIC | Age: 69
DRG: 314 | End: 2024-01-01
Attending: STUDENT IN AN ORGANIZED HEALTH CARE EDUCATION/TRAINING PROGRAM
Payer: COMMERCIAL

## 2024-01-01 ENCOUNTER — APPOINTMENT (OUTPATIENT)
Dept: SPEECH THERAPY | Facility: CLINIC | Age: 69
DRG: 314 | End: 2024-01-01
Attending: STUDENT IN AN ORGANIZED HEALTH CARE EDUCATION/TRAINING PROGRAM
Payer: COMMERCIAL

## 2024-01-01 ENCOUNTER — VIRTUAL VISIT (OUTPATIENT)
Dept: TRANSPLANT | Facility: CLINIC | Age: 69
End: 2024-01-01
Payer: COMMERCIAL

## 2024-01-01 ENCOUNTER — ALLIED HEALTH/NURSE VISIT (OUTPATIENT)
Dept: TRANSPLANT | Facility: CLINIC | Age: 69
End: 2024-01-01

## 2024-01-01 ENCOUNTER — VIRTUAL VISIT (OUTPATIENT)
Dept: HEMATOLOGY | Facility: CLINIC | Age: 69
End: 2024-01-01
Attending: STUDENT IN AN ORGANIZED HEALTH CARE EDUCATION/TRAINING PROGRAM
Payer: COMMERCIAL

## 2024-01-01 ENCOUNTER — OFFICE VISIT (OUTPATIENT)
Dept: FAMILY MEDICINE | Facility: CLINIC | Age: 69
End: 2024-01-01
Payer: COMMERCIAL

## 2024-01-01 ENCOUNTER — ANESTHESIA (OUTPATIENT)
Dept: SURGERY | Facility: CLINIC | Age: 69
DRG: 335 | End: 2024-01-01
Payer: COMMERCIAL

## 2024-01-01 VITALS
SYSTOLIC BLOOD PRESSURE: 108 MMHG | DIASTOLIC BLOOD PRESSURE: 78 MMHG | WEIGHT: 179.6 LBS | OXYGEN SATURATION: 94 % | TEMPERATURE: 97.9 F | HEIGHT: 68 IN | RESPIRATION RATE: 18 BRPM | HEART RATE: 119 BPM | BODY MASS INDEX: 27.22 KG/M2

## 2024-01-01 VITALS
HEART RATE: 116 BPM | BODY MASS INDEX: 30.46 KG/M2 | OXYGEN SATURATION: 99 % | SYSTOLIC BLOOD PRESSURE: 100 MMHG | DIASTOLIC BLOOD PRESSURE: 69 MMHG | HEIGHT: 68 IN | WEIGHT: 201 LBS

## 2024-01-01 VITALS
HEART RATE: 117 BPM | OXYGEN SATURATION: 100 % | DIASTOLIC BLOOD PRESSURE: 85 MMHG | WEIGHT: 195.4 LBS | SYSTOLIC BLOOD PRESSURE: 116 MMHG | BODY MASS INDEX: 29.71 KG/M2

## 2024-01-01 VITALS
OXYGEN SATURATION: 100 % | DIASTOLIC BLOOD PRESSURE: 85 MMHG | TEMPERATURE: 97.5 F | WEIGHT: 195.3 LBS | HEART RATE: 85 BPM | SYSTOLIC BLOOD PRESSURE: 117 MMHG | BODY MASS INDEX: 29.6 KG/M2 | HEIGHT: 68 IN | RESPIRATION RATE: 18 BRPM

## 2024-01-01 VITALS
WEIGHT: 177.69 LBS | DIASTOLIC BLOOD PRESSURE: 98 MMHG | HEIGHT: 68 IN | SYSTOLIC BLOOD PRESSURE: 131 MMHG | RESPIRATION RATE: 20 BRPM | TEMPERATURE: 99 F | BODY MASS INDEX: 26.93 KG/M2 | HEART RATE: 103 BPM | OXYGEN SATURATION: 98 %

## 2024-01-01 VITALS
DIASTOLIC BLOOD PRESSURE: 102 MMHG | BODY MASS INDEX: 29.6 KG/M2 | OXYGEN SATURATION: 97 % | OXYGEN SATURATION: 99 % | DIASTOLIC BLOOD PRESSURE: 69 MMHG | HEART RATE: 106 BPM | SYSTOLIC BLOOD PRESSURE: 106 MMHG | WEIGHT: 194.6 LBS | SYSTOLIC BLOOD PRESSURE: 150 MMHG | RESPIRATION RATE: 17 BRPM | HEART RATE: 97 BPM

## 2024-01-01 VITALS
OXYGEN SATURATION: 98 % | WEIGHT: 190 LBS | SYSTOLIC BLOOD PRESSURE: 97 MMHG | DIASTOLIC BLOOD PRESSURE: 64 MMHG | TEMPERATURE: 98 F | RESPIRATION RATE: 18 BRPM | BODY MASS INDEX: 28.79 KG/M2 | HEART RATE: 116 BPM | HEIGHT: 68 IN

## 2024-01-01 VITALS
BODY MASS INDEX: 28.79 KG/M2 | OXYGEN SATURATION: 99 % | TEMPERATURE: 99.1 F | WEIGHT: 190 LBS | DIASTOLIC BLOOD PRESSURE: 58 MMHG | SYSTOLIC BLOOD PRESSURE: 99 MMHG | RESPIRATION RATE: 18 BRPM | HEART RATE: 118 BPM | HEIGHT: 68 IN

## 2024-01-01 VITALS
OXYGEN SATURATION: 99 % | RESPIRATION RATE: 20 BRPM | WEIGHT: 195 LBS | TEMPERATURE: 97.5 F | BODY MASS INDEX: 29.65 KG/M2 | HEART RATE: 101 BPM | SYSTOLIC BLOOD PRESSURE: 132 MMHG | DIASTOLIC BLOOD PRESSURE: 89 MMHG

## 2024-01-01 VITALS
BODY MASS INDEX: 29.96 KG/M2 | SYSTOLIC BLOOD PRESSURE: 101 MMHG | RESPIRATION RATE: 24 BRPM | TEMPERATURE: 97.7 F | DIASTOLIC BLOOD PRESSURE: 69 MMHG | OXYGEN SATURATION: 100 % | HEART RATE: 120 BPM | WEIGHT: 197 LBS

## 2024-01-01 VITALS — WEIGHT: 214 LBS | BODY MASS INDEX: 32.43 KG/M2 | HEIGHT: 68 IN

## 2024-01-01 VITALS
OXYGEN SATURATION: 100 % | BODY MASS INDEX: 31.22 KG/M2 | WEIGHT: 206 LBS | SYSTOLIC BLOOD PRESSURE: 122 MMHG | RESPIRATION RATE: 18 BRPM | DIASTOLIC BLOOD PRESSURE: 78 MMHG | HEIGHT: 68 IN | TEMPERATURE: 97.9 F | HEART RATE: 100 BPM

## 2024-01-01 VITALS
SYSTOLIC BLOOD PRESSURE: 115 MMHG | HEART RATE: 103 BPM | BODY MASS INDEX: 31.7 KG/M2 | WEIGHT: 208.5 LBS | OXYGEN SATURATION: 99 % | DIASTOLIC BLOOD PRESSURE: 79 MMHG

## 2024-01-01 VITALS
DIASTOLIC BLOOD PRESSURE: 76 MMHG | WEIGHT: 203 LBS | BODY MASS INDEX: 30.87 KG/M2 | SYSTOLIC BLOOD PRESSURE: 114 MMHG | OXYGEN SATURATION: 100 % | HEART RATE: 108 BPM

## 2024-01-01 VITALS
HEART RATE: 113 BPM | OXYGEN SATURATION: 99 % | SYSTOLIC BLOOD PRESSURE: 125 MMHG | DIASTOLIC BLOOD PRESSURE: 89 MMHG | WEIGHT: 215 LBS | BODY MASS INDEX: 32.69 KG/M2

## 2024-01-01 VITALS
OXYGEN SATURATION: 97 % | DIASTOLIC BLOOD PRESSURE: 88 MMHG | SYSTOLIC BLOOD PRESSURE: 152 MMHG | BODY MASS INDEX: 30.67 KG/M2 | HEART RATE: 110 BPM | WEIGHT: 201.7 LBS

## 2024-01-01 VITALS
WEIGHT: 187 LBS | DIASTOLIC BLOOD PRESSURE: 66 MMHG | SYSTOLIC BLOOD PRESSURE: 103 MMHG | TEMPERATURE: 98.1 F | HEART RATE: 118 BPM | OXYGEN SATURATION: 99 % | BODY MASS INDEX: 28.43 KG/M2

## 2024-01-01 VITALS
BODY MASS INDEX: 32.23 KG/M2 | DIASTOLIC BLOOD PRESSURE: 80 MMHG | WEIGHT: 212 LBS | SYSTOLIC BLOOD PRESSURE: 114 MMHG | OXYGEN SATURATION: 98 % | HEART RATE: 114 BPM

## 2024-01-01 VITALS
DIASTOLIC BLOOD PRESSURE: 87 MMHG | OXYGEN SATURATION: 100 % | SYSTOLIC BLOOD PRESSURE: 112 MMHG | WEIGHT: 186.1 LBS | HEART RATE: 111 BPM | TEMPERATURE: 98.2 F | HEIGHT: 68 IN | BODY MASS INDEX: 28.2 KG/M2 | RESPIRATION RATE: 16 BRPM

## 2024-01-01 VITALS — BODY MASS INDEX: 31.32 KG/M2 | WEIGHT: 206 LBS

## 2024-01-01 DIAGNOSIS — Z94.1 TRANSPLANTED HEART (H): ICD-10-CM

## 2024-01-01 DIAGNOSIS — R55 SYNCOPE, UNSPECIFIED SYNCOPE TYPE: ICD-10-CM

## 2024-01-01 DIAGNOSIS — N18.4 CHRONIC KIDNEY DISEASE, STAGE IV (SEVERE) (H): ICD-10-CM

## 2024-01-01 DIAGNOSIS — Z86.711 HISTORY OF PULMONARY EMBOLISM: ICD-10-CM

## 2024-01-01 DIAGNOSIS — I26.93 SINGLE SUBSEGMENTAL PULMONARY EMBOLISM WITHOUT ACUTE COR PULMONALE (H): ICD-10-CM

## 2024-01-01 DIAGNOSIS — Z98.84 H/O GASTRIC BYPASS: ICD-10-CM

## 2024-01-01 DIAGNOSIS — E78.5 HYPERLIPIDEMIA LDL GOAL <70: ICD-10-CM

## 2024-01-01 DIAGNOSIS — E11.9 DIABETES MELLITUS (H): ICD-10-CM

## 2024-01-01 DIAGNOSIS — N18.4 CHRONIC KIDNEY DISEASE (CKD), ACTIVE MEDICAL MANAGEMENT WITHOUT DIALYSIS, STAGE 4 (SEVERE) (H): ICD-10-CM

## 2024-01-01 DIAGNOSIS — N18.9 CHRONIC KIDNEY DISEASE, UNSPECIFIED CKD STAGE: ICD-10-CM

## 2024-01-01 DIAGNOSIS — Z94.1 TRANSPLANTED HEART (H): Primary | ICD-10-CM

## 2024-01-01 DIAGNOSIS — Z99.2 ESRD ON HEMODIALYSIS (H): ICD-10-CM

## 2024-01-01 DIAGNOSIS — Z01.818 PRE-TRANSPLANT EVALUATION FOR KIDNEY TRANSPLANT: ICD-10-CM

## 2024-01-01 DIAGNOSIS — E11.21 CONTROLLED TYPE 2 DIABETES MELLITUS WITH DIABETIC NEPHROPATHY, WITHOUT LONG-TERM CURRENT USE OF INSULIN (H): ICD-10-CM

## 2024-01-01 DIAGNOSIS — R19.7 DIARRHEA: ICD-10-CM

## 2024-01-01 DIAGNOSIS — Z94.1 HEART REPLACED BY TRANSPLANT (H): ICD-10-CM

## 2024-01-01 DIAGNOSIS — I48.91 ATRIAL FIBRILLATION, UNSPECIFIED TYPE (H): Primary | ICD-10-CM

## 2024-01-01 DIAGNOSIS — N17.9 ACUTE KIDNEY INJURY (H): Primary | ICD-10-CM

## 2024-01-01 DIAGNOSIS — I26.99 PULMONARY EMBOLISM (H): Primary | ICD-10-CM

## 2024-01-01 DIAGNOSIS — Z23 ENCOUNTER FOR IMMUNIZATION: ICD-10-CM

## 2024-01-01 DIAGNOSIS — I26.99 PULMONARY EMBOLISM (H): ICD-10-CM

## 2024-01-01 DIAGNOSIS — N18.4 CHRONIC KIDNEY DISEASE (CKD), ACTIVE MEDICAL MANAGEMENT WITHOUT DIALYSIS, STAGE 4 (SEVERE) (H): Primary | ICD-10-CM

## 2024-01-01 DIAGNOSIS — Z86.711 HISTORY OF PULMONARY EMBOLISM: Primary | ICD-10-CM

## 2024-01-01 DIAGNOSIS — Z13.220 LIPID SCREENING: ICD-10-CM

## 2024-01-01 DIAGNOSIS — Z94.1 S/P ORTHOTOPIC HEART TRANSPLANT (H): ICD-10-CM

## 2024-01-01 DIAGNOSIS — N18.32 STAGE 3B CHRONIC KIDNEY DISEASE (H): ICD-10-CM

## 2024-01-01 DIAGNOSIS — Z76.82 AWAITING ORGAN TRANSPLANT: Primary | ICD-10-CM

## 2024-01-01 DIAGNOSIS — E61.1 IRON DEFICIENCY: ICD-10-CM

## 2024-01-01 DIAGNOSIS — N17.9 ACUTE KIDNEY INJURY (H): ICD-10-CM

## 2024-01-01 DIAGNOSIS — Z98.890 S/P LAPAROSCOPY WITH LYSIS OF ADHESIONS: Primary | ICD-10-CM

## 2024-01-01 DIAGNOSIS — N18.6 ESRD ON HEMODIALYSIS (H): ICD-10-CM

## 2024-01-01 DIAGNOSIS — R97.20 ELEVATED PROSTATE SPECIFIC ANTIGEN (PSA): Primary | ICD-10-CM

## 2024-01-01 DIAGNOSIS — Z11.1 SCREENING FOR TUBERCULOSIS: ICD-10-CM

## 2024-01-01 DIAGNOSIS — Z01.818 ENCOUNTER FOR OTHER PREPROCEDURAL EXAMINATION: ICD-10-CM

## 2024-01-01 DIAGNOSIS — S01.91XA LACERATION OF HEAD WITHOUT FOREIGN BODY, UNSPECIFIED PART OF HEAD, INITIAL ENCOUNTER: ICD-10-CM

## 2024-01-01 DIAGNOSIS — M1A.00X0 GOUTY ARTHROPATHY, CHRONIC, WITHOUT TOPHI: ICD-10-CM

## 2024-01-01 DIAGNOSIS — Z86.0101 HISTORY OF ADENOMATOUS POLYP OF COLON: ICD-10-CM

## 2024-01-01 DIAGNOSIS — N18.5 STAGE 5 CHRONIC KIDNEY DISEASE NOT ON CHRONIC DIALYSIS (H): Primary | ICD-10-CM

## 2024-01-01 DIAGNOSIS — N13.8 BPH WITH OBSTRUCTION/LOWER URINARY TRACT SYMPTOMS: ICD-10-CM

## 2024-01-01 DIAGNOSIS — Z01.818 PRE-TRANSPLANT EVALUATION FOR ESRD (END STAGE RENAL DISEASE): Primary | ICD-10-CM

## 2024-01-01 DIAGNOSIS — J44.9 CHRONIC OBSTRUCTIVE PULMONARY DISEASE, UNSPECIFIED COPD TYPE (H): ICD-10-CM

## 2024-01-01 DIAGNOSIS — F33.9 MAJOR DEPRESSION, RECURRENT, CHRONIC (H): ICD-10-CM

## 2024-01-01 DIAGNOSIS — D64.9 MILD ANEMIA: ICD-10-CM

## 2024-01-01 DIAGNOSIS — Z76.82 ORGAN TRANSPLANT CANDIDATE: ICD-10-CM

## 2024-01-01 DIAGNOSIS — N40.1 BPH WITH OBSTRUCTION/LOWER URINARY TRACT SYMPTOMS: ICD-10-CM

## 2024-01-01 DIAGNOSIS — R97.20 ELEVATED PROSTATE SPECIFIC ANTIGEN (PSA): ICD-10-CM

## 2024-01-01 DIAGNOSIS — N25.81 SECONDARY HYPERPARATHYROIDISM (H): ICD-10-CM

## 2024-01-01 DIAGNOSIS — J69.0 ASPIRATION PNEUMONIA, UNSPECIFIED ASPIRATION PNEUMONIA TYPE, UNSPECIFIED LATERALITY, UNSPECIFIED PART OF LUNG (H): ICD-10-CM

## 2024-01-01 DIAGNOSIS — M1A.3790 CHRONIC GOUT DUE TO RENAL IMPAIRMENT INVOLVING FOOT WITHOUT TOPHUS, UNSPECIFIED LATERALITY: ICD-10-CM

## 2024-01-01 DIAGNOSIS — N18.5 STAGE 5 CHRONIC KIDNEY DISEASE NOT ON CHRONIC DIALYSIS (H): ICD-10-CM

## 2024-01-01 DIAGNOSIS — R73.01 IFG (IMPAIRED FASTING GLUCOSE): Primary | ICD-10-CM

## 2024-01-01 DIAGNOSIS — R19.7 DIARRHEA: Primary | ICD-10-CM

## 2024-01-01 DIAGNOSIS — E11.9 DIABETES MELLITUS (H): Primary | ICD-10-CM

## 2024-01-01 DIAGNOSIS — K91.1 POSTSURGICAL DUMPING SYNDROME: ICD-10-CM

## 2024-01-01 DIAGNOSIS — N05.8 OXALATE NEPHROPATHY: ICD-10-CM

## 2024-01-01 DIAGNOSIS — G62.9 PERIPHERAL POLYNEUROPATHY: ICD-10-CM

## 2024-01-01 DIAGNOSIS — Z76.82 ORGAN TRANSPLANT CANDIDATE: Primary | ICD-10-CM

## 2024-01-01 DIAGNOSIS — K56.609 SMALL BOWEL OBSTRUCTION (H): Primary | ICD-10-CM

## 2024-01-01 DIAGNOSIS — I10 HTN (HYPERTENSION): ICD-10-CM

## 2024-01-01 DIAGNOSIS — R05.1 ACUTE COUGH: Primary | ICD-10-CM

## 2024-01-01 DIAGNOSIS — Z11.59 SCREENING FOR VIRAL DISEASE: ICD-10-CM

## 2024-01-01 DIAGNOSIS — I50.9 NEW ONSET OF CONGESTIVE HEART FAILURE (H): ICD-10-CM

## 2024-01-01 DIAGNOSIS — Z86.0101 HX OF ADENOMATOUS COLONIC POLYPS: Primary | ICD-10-CM

## 2024-01-01 DIAGNOSIS — N18.6 ESRD (END STAGE RENAL DISEASE) ON DIALYSIS (H): Primary | ICD-10-CM

## 2024-01-01 DIAGNOSIS — D50.9 IRON DEFICIENCY ANEMIA, UNSPECIFIED IRON DEFICIENCY ANEMIA TYPE: Primary | ICD-10-CM

## 2024-01-01 DIAGNOSIS — N18.4 ANEMIA IN STAGE 4 CHRONIC KIDNEY DISEASE (H): ICD-10-CM

## 2024-01-01 DIAGNOSIS — Z99.2 ESRD ON DIALYSIS (H): Primary | ICD-10-CM

## 2024-01-01 DIAGNOSIS — J47.0 BRONCHIECTASIS WITH ACUTE LOWER RESPIRATORY INFECTION (H): ICD-10-CM

## 2024-01-01 DIAGNOSIS — D50.9 IRON DEFICIENCY ANEMIA, UNSPECIFIED IRON DEFICIENCY ANEMIA TYPE: ICD-10-CM

## 2024-01-01 DIAGNOSIS — Z13.29 THYROID DISORDER SCREENING: ICD-10-CM

## 2024-01-01 DIAGNOSIS — H61.23 BILATERAL IMPACTED CERUMEN: ICD-10-CM

## 2024-01-01 DIAGNOSIS — R79.89 ELEVATED TROPONIN: ICD-10-CM

## 2024-01-01 DIAGNOSIS — Z86.0100 HISTORY OF COLONIC POLYPS: Primary | ICD-10-CM

## 2024-01-01 DIAGNOSIS — J45.20 MILD INTERMITTENT ASTHMA, UNSPECIFIED WHETHER COMPLICATED: ICD-10-CM

## 2024-01-01 DIAGNOSIS — N18.6 ESRD (END STAGE RENAL DISEASE) (H): Primary | ICD-10-CM

## 2024-01-01 DIAGNOSIS — Z79.01 ANTICOAGULATED ON COUMADIN: ICD-10-CM

## 2024-01-01 DIAGNOSIS — E53.8 VITAMIN B12 DEFICIENCY (NON ANEMIC): ICD-10-CM

## 2024-01-01 DIAGNOSIS — Z99.2 ESRD (END STAGE RENAL DISEASE) ON DIALYSIS (H): Primary | ICD-10-CM

## 2024-01-01 DIAGNOSIS — J44.9 CHRONIC OBSTRUCTIVE PULMONARY DISEASE, UNSPECIFIED COPD TYPE (H): Primary | ICD-10-CM

## 2024-01-01 DIAGNOSIS — N18.6 ESRD ON DIALYSIS (H): Primary | ICD-10-CM

## 2024-01-01 DIAGNOSIS — Z99.2 ENCOUNTER REGARDING VASCULAR ACCESS FOR DIALYSIS FOR END-STAGE RENAL DISEASE (H): Primary | ICD-10-CM

## 2024-01-01 DIAGNOSIS — N18.6 ESRD (END STAGE RENAL DISEASE) (H): ICD-10-CM

## 2024-01-01 DIAGNOSIS — D63.1 ANEMIA IN STAGE 4 CHRONIC KIDNEY DISEASE (H): ICD-10-CM

## 2024-01-01 DIAGNOSIS — Z01.818 ENCOUNTER FOR OTHER PREPROCEDURAL EXAMINATION: Primary | ICD-10-CM

## 2024-01-01 DIAGNOSIS — E55.9 VITAMIN D DEFICIENCY: ICD-10-CM

## 2024-01-01 DIAGNOSIS — N18.4 CHRONIC RENAL DISEASE, STAGE IV (H): Primary | ICD-10-CM

## 2024-01-01 DIAGNOSIS — R73.01 IFG (IMPAIRED FASTING GLUCOSE): ICD-10-CM

## 2024-01-01 DIAGNOSIS — D84.9 IMMUNOCOMPROMISED PATIENT (H): ICD-10-CM

## 2024-01-01 DIAGNOSIS — N18.6 ENCOUNTER REGARDING VASCULAR ACCESS FOR DIALYSIS FOR END-STAGE RENAL DISEASE (H): Primary | ICD-10-CM

## 2024-01-01 DIAGNOSIS — I26.93 SINGLE SUBSEGMENTAL PULMONARY EMBOLISM WITHOUT ACUTE COR PULMONALE (H): Primary | ICD-10-CM

## 2024-01-01 DIAGNOSIS — N18.32 STAGE 3B CHRONIC KIDNEY DISEASE (H): Primary | ICD-10-CM

## 2024-01-01 DIAGNOSIS — R50.9 LOW GRADE FEVER: ICD-10-CM

## 2024-01-01 DIAGNOSIS — J18.9 COMMUNITY ACQUIRED PNEUMONIA, UNSPECIFIED LATERALITY: ICD-10-CM

## 2024-01-01 DIAGNOSIS — N18.4 CHRONIC RENAL DISEASE, STAGE IV (H): ICD-10-CM

## 2024-01-01 DIAGNOSIS — E78.5 HYPERLIPIDEMIA, UNSPECIFIED HYPERLIPIDEMIA TYPE: ICD-10-CM

## 2024-01-01 DIAGNOSIS — Z12.5 PROSTATE CANCER SCREENING: ICD-10-CM

## 2024-01-01 DIAGNOSIS — I15.9 SECONDARY HYPERTENSION: ICD-10-CM

## 2024-01-01 DIAGNOSIS — I50.9 ACUTE CONGESTIVE HEART FAILURE, UNSPECIFIED HEART FAILURE TYPE (H): ICD-10-CM

## 2024-01-01 DIAGNOSIS — Z12.5 SCREENING FOR PROSTATE CANCER: ICD-10-CM

## 2024-01-01 DIAGNOSIS — I10 PRIMARY HYPERTENSION: ICD-10-CM

## 2024-01-01 DIAGNOSIS — R00.1 BRADYCARDIA: Primary | ICD-10-CM

## 2024-01-01 DIAGNOSIS — Z23 HIGH PRIORITY FOR 2019-NCOV VACCINE: ICD-10-CM

## 2024-01-01 DIAGNOSIS — Z94.1 HISTORY OF HEART TRANSPLANT (H): ICD-10-CM

## 2024-01-01 DIAGNOSIS — S09.90XA INJURY OF HEAD, INITIAL ENCOUNTER: ICD-10-CM

## 2024-01-01 DIAGNOSIS — Z11.1 SCREENING FOR TUBERCULOSIS: Primary | ICD-10-CM

## 2024-01-01 DIAGNOSIS — Z11.52 ENCOUNTER FOR SCREENING FOR COVID-19: ICD-10-CM

## 2024-01-01 DIAGNOSIS — K56.609 SMALL BOWEL OBSTRUCTION (H): ICD-10-CM

## 2024-01-01 LAB
A*: NORMAL
A*LOCUS SEROLOGIC EQUIVALENT: 1
A*LOCUS: NORMAL
A*SEROLOGIC EQUIVALENT: 31
A1 AB TITR SERPL: >256 {TITER}
A1 AB TITR SERPL: >256 {TITER}
ABO/RH(D): NORMAL
ABO/RH(D): NORMAL
ABTEST METHOD: NORMAL
ALBUMIN MFR UR ELPH: 14.8 MG/DL
ALBUMIN MFR UR ELPH: 164 MG/DL
ALBUMIN MFR UR ELPH: 18.7 MG/DL
ALBUMIN MFR UR ELPH: 21.6 MG/DL
ALBUMIN MFR UR ELPH: 46.5 MG/DL
ALBUMIN SERPL BCG-MCNC: 3.1 G/DL (ref 3.5–5.2)
ALBUMIN SERPL BCG-MCNC: 3.2 G/DL (ref 3.5–5.2)
ALBUMIN SERPL BCG-MCNC: 3.2 G/DL (ref 3.5–5.2)
ALBUMIN SERPL BCG-MCNC: 3.5 G/DL (ref 3.5–5.2)
ALBUMIN SERPL BCG-MCNC: 3.7 G/DL (ref 3.5–5.2)
ALBUMIN SERPL BCG-MCNC: 3.7 G/DL (ref 3.5–5.2)
ALBUMIN SERPL BCG-MCNC: 3.8 G/DL (ref 3.5–5.2)
ALBUMIN SERPL BCG-MCNC: 3.9 G/DL (ref 3.5–5.2)
ALBUMIN SERPL BCG-MCNC: 4 G/DL (ref 3.5–5.2)
ALBUMIN SERPL BCG-MCNC: 4.1 G/DL (ref 3.5–5.2)
ALBUMIN SERPL BCG-MCNC: 4.2 G/DL (ref 3.5–5.2)
ALBUMIN SERPL BCG-MCNC: 4.3 G/DL (ref 3.5–5.2)
ALBUMIN SERPL BCG-MCNC: 4.4 G/DL (ref 3.5–5.2)
ALBUMIN SERPL BCG-MCNC: 4.4 G/DL (ref 3.5–5.2)
ALBUMIN SERPL BCG-MCNC: 4.5 G/DL (ref 3.5–5.2)
ALBUMIN SERPL ELPH-MCNC: 3.9 G/DL (ref 3.7–5.1)
ALBUMIN UR-MCNC: 30 MG/DL
ALBUMIN UR-MCNC: 50 MG/DL
ALBUMIN UR-MCNC: ABNORMAL MG/DL
ALBUMIN UR-MCNC: NEGATIVE MG/DL
ALLEN'S TEST: ABNORMAL
ALLEN'S TEST: NO
ALP SERPL-CCNC: 101 U/L (ref 40–150)
ALP SERPL-CCNC: 105 U/L (ref 40–150)
ALP SERPL-CCNC: 110 U/L (ref 40–150)
ALP SERPL-CCNC: 111 U/L (ref 40–150)
ALP SERPL-CCNC: 118 U/L (ref 40–150)
ALP SERPL-CCNC: 132 U/L (ref 40–150)
ALP SERPL-CCNC: 137 U/L (ref 40–150)
ALP SERPL-CCNC: 150 U/L (ref 40–150)
ALP SERPL-CCNC: 80 U/L (ref 40–150)
ALP SERPL-CCNC: 82 U/L (ref 40–150)
ALP SERPL-CCNC: 84 U/L (ref 40–150)
ALP SERPL-CCNC: 85 U/L (ref 40–150)
ALP SERPL-CCNC: 86 U/L (ref 40–150)
ALP SERPL-CCNC: 87 U/L (ref 40–150)
ALP SERPL-CCNC: 87 U/L (ref 40–150)
ALP SERPL-CCNC: 89 U/L (ref 40–150)
ALP SERPL-CCNC: 91 U/L (ref 40–150)
ALP SERPL-CCNC: 93 U/L (ref 40–150)
ALP SERPL-CCNC: 93 U/L (ref 40–150)
ALP SERPL-CCNC: 95 U/L (ref 40–150)
ALP SERPL-CCNC: 95 U/L (ref 40–150)
ALP SERPL-CCNC: 96 U/L (ref 40–150)
ALP SERPL-CCNC: 97 U/L (ref 40–150)
ALPHA1 GLOB SERPL ELPH-MCNC: 0.3 G/DL (ref 0.2–0.4)
ALPHA2 GLOB SERPL ELPH-MCNC: 0.5 G/DL (ref 0.5–0.9)
ALT SERPL W P-5'-P-CCNC: 11 U/L (ref 0–70)
ALT SERPL W P-5'-P-CCNC: 12 U/L (ref 0–70)
ALT SERPL W P-5'-P-CCNC: 14 U/L (ref 0–70)
ALT SERPL W P-5'-P-CCNC: 15 U/L (ref 0–70)
ALT SERPL W P-5'-P-CCNC: 16 U/L (ref 0–70)
ALT SERPL W P-5'-P-CCNC: 16 U/L (ref 0–70)
ALT SERPL W P-5'-P-CCNC: 17 U/L (ref 0–70)
ALT SERPL W P-5'-P-CCNC: 20 U/L (ref 0–70)
ALT SERPL W P-5'-P-CCNC: 20 U/L (ref 0–70)
ALT SERPL W P-5'-P-CCNC: 21 U/L (ref 0–70)
ALT SERPL W P-5'-P-CCNC: 24 U/L (ref 0–70)
ALT SERPL W P-5'-P-CCNC: 25 U/L (ref 0–70)
ALT SERPL W P-5'-P-CCNC: 25 U/L (ref 0–70)
ALT SERPL W P-5'-P-CCNC: 26 U/L (ref 0–70)
ALT SERPL W P-5'-P-CCNC: 31 U/L (ref 0–70)
ALT SERPL W P-5'-P-CCNC: 33 U/L (ref 0–70)
ALT SERPL W P-5'-P-CCNC: 38 U/L (ref 0–70)
ALT SERPL W P-5'-P-CCNC: 49 U/L (ref 0–70)
ALT SERPL W P-5'-P-CCNC: 8 U/L (ref 0–70)
ALT SERPL W P-5'-P-CCNC: 9 U/L (ref 0–70)
ALT SERPL W P-5'-P-CCNC: 9 U/L (ref 0–70)
ANCA AB PATTERN SER IF-IMP: NORMAL
ANION GAP SERPL CALCULATED.3IONS-SCNC: 10 MMOL/L (ref 7–15)
ANION GAP SERPL CALCULATED.3IONS-SCNC: 11 MMOL/L (ref 7–15)
ANION GAP SERPL CALCULATED.3IONS-SCNC: 12 MMOL/L (ref 7–15)
ANION GAP SERPL CALCULATED.3IONS-SCNC: 13 MMOL/L (ref 7–15)
ANION GAP SERPL CALCULATED.3IONS-SCNC: 14 MMOL/L (ref 7–15)
ANION GAP SERPL CALCULATED.3IONS-SCNC: 15 MMOL/L (ref 7–15)
ANION GAP SERPL CALCULATED.3IONS-SCNC: 16 MMOL/L (ref 7–15)
ANION GAP SERPL CALCULATED.3IONS-SCNC: 17 MMOL/L (ref 7–15)
ANION GAP SERPL CALCULATED.3IONS-SCNC: 18 MMOL/L (ref 7–15)
ANION GAP SERPL CALCULATED.3IONS-SCNC: 19 MMOL/L (ref 7–15)
ANION GAP SERPL CALCULATED.3IONS-SCNC: 19 MMOL/L (ref 7–15)
ANION GAP SERPL CALCULATED.3IONS-SCNC: 21 MMOL/L (ref 7–15)
ANION GAP SERPL CALCULATED.3IONS-SCNC: 21 MMOL/L (ref 7–15)
ANION GAP SERPL CALCULATED.3IONS-SCNC: 8 MMOL/L (ref 7–15)
ANTIBODY SCREEN: NEGATIVE
ANTIBODY TITER IGM SCREEN: NEGATIVE
APPEARANCE UR: CLEAR
APTT PPP: 35 SECONDS (ref 22–38)
APTT PPP: 36 SECONDS (ref 22–38)
APTT PPP: 87 SECONDS (ref 22–38)
AST SERPL W P-5'-P-CCNC: 13 U/L (ref 0–45)
AST SERPL W P-5'-P-CCNC: 14 U/L (ref 0–45)
AST SERPL W P-5'-P-CCNC: 14 U/L (ref 0–45)
AST SERPL W P-5'-P-CCNC: 15 U/L (ref 0–45)
AST SERPL W P-5'-P-CCNC: 16 U/L (ref 0–45)
AST SERPL W P-5'-P-CCNC: 16 U/L (ref 0–45)
AST SERPL W P-5'-P-CCNC: 18 U/L (ref 0–45)
AST SERPL W P-5'-P-CCNC: 19 U/L (ref 0–45)
AST SERPL W P-5'-P-CCNC: 21 U/L (ref 0–45)
AST SERPL W P-5'-P-CCNC: 24 U/L (ref 0–45)
AST SERPL W P-5'-P-CCNC: 24 U/L (ref 0–45)
AST SERPL W P-5'-P-CCNC: 30 U/L (ref 0–45)
AST SERPL W P-5'-P-CCNC: 31 U/L (ref 0–45)
AST SERPL W P-5'-P-CCNC: 32 U/L (ref 0–45)
AST SERPL W P-5'-P-CCNC: 34 U/L (ref 0–45)
AST SERPL W P-5'-P-CCNC: 36 U/L (ref 0–45)
AST SERPL W P-5'-P-CCNC: 41 U/L (ref 0–45)
AST SERPL W P-5'-P-CCNC: 41 U/L (ref 0–45)
AST SERPL W P-5'-P-CCNC: 44 U/L (ref 0–45)
AST SERPL W P-5'-P-CCNC: 48 U/L (ref 0–45)
AST SERPL W P-5'-P-CCNC: NORMAL U/L
ATRIAL RATE - MUSE: 104 BPM
ATRIAL RATE - MUSE: 110 BPM
ATRIAL RATE - MUSE: 116 BPM
ATRIAL RATE - MUSE: 129 BPM
ATRIAL RATE - MUSE: 133 BPM
ATRIAL RATE - MUSE: 92 BPM
ATRIAL RATE - MUSE: 92 BPM
ATRIAL RATE - MUSE: 96 BPM
ATRIAL RATE - MUSE: NORMAL BPM
B IGG TITR SERPL: >256 {TITER}
B*: NORMAL
B*LOCUS SEROLOGIC EQUIVALENT: 7
B*LOCUS: NORMAL
B*SEROLOGIC EQUIVALENT: 57
B-GLOBULIN SERPL ELPH-MCNC: 0.6 G/DL (ref 0.6–1)
BACTERIA #/AREA URNS HPF: ABNORMAL /HPF
BACTERIA #/AREA URNS HPF: ABNORMAL /HPF
BACTERIA BLD CULT: NO GROWTH
BACTERIA SPT CULT: ABNORMAL
BASE EXCESS BLDA CALC-SCNC: -10 MMOL/L (ref -9.6–2)
BASE EXCESS BLDA CALC-SCNC: -10.1 MMOL/L (ref -9.6–2)
BASE EXCESS BLDA CALC-SCNC: -10.9 MMOL/L (ref -9.6–2)
BASE EXCESS BLDA CALC-SCNC: -7.9 MMOL/L (ref -9–1.8)
BASE EXCESS BLDA CALC-SCNC: -9.1 MMOL/L (ref -9–1.8)
BASE EXCESS BLDA CALC-SCNC: -9.1 MMOL/L (ref -9–1.8)
BASE EXCESS BLDA CALC-SCNC: -9.5 MMOL/L (ref -9–1.8)
BASOPHILS # BLD AUTO: 0 10E3/UL (ref 0–0.2)
BASOPHILS # BLD AUTO: 0.1 10E3/UL (ref 0–0.2)
BASOPHILS NFR BLD AUTO: 0 %
BASOPHILS NFR BLD AUTO: 0 %
BASOPHILS NFR BLD AUTO: 1 %
BILIRUB DIRECT SERPL-MCNC: 0.21 MG/DL (ref 0–0.3)
BILIRUB DIRECT SERPL-MCNC: 0.22 MG/DL (ref 0–0.3)
BILIRUB DIRECT SERPL-MCNC: 0.22 MG/DL (ref 0–0.3)
BILIRUB DIRECT SERPL-MCNC: 0.25 MG/DL (ref 0–0.3)
BILIRUB DIRECT SERPL-MCNC: 0.28 MG/DL (ref 0–0.3)
BILIRUB DIRECT SERPL-MCNC: 0.28 MG/DL (ref 0–0.3)
BILIRUB DIRECT SERPL-MCNC: 0.29 MG/DL (ref 0–0.3)
BILIRUB DIRECT SERPL-MCNC: 0.32 MG/DL (ref 0–0.3)
BILIRUB DIRECT SERPL-MCNC: NORMAL MG/DL
BILIRUB DIRECT SERPL-MCNC: NORMAL MG/DL
BILIRUB SERPL-MCNC: 0.4 MG/DL
BILIRUB SERPL-MCNC: 0.4 MG/DL
BILIRUB SERPL-MCNC: 0.5 MG/DL
BILIRUB SERPL-MCNC: 0.6 MG/DL
BILIRUB SERPL-MCNC: 0.7 MG/DL
BILIRUB SERPL-MCNC: 0.9 MG/DL
BILIRUB SERPL-MCNC: 0.9 MG/DL
BILIRUB SERPL-MCNC: 1 MG/DL
BILIRUB SERPL-MCNC: 1.3 MG/DL
BILIRUB UR QL STRIP: NEGATIVE
BK VIRUS DNA IU/ML, INSTRUMENT (6800): 4040 IU/ML
BK VIRUS SPECIMEN TYPE: ABNORMAL
BK VIRUS SPECIMEN TYPE: NORMAL
BKV DNA # SPEC NAA+PROBE: NOT DETECTED IU/ML
BKV DNA SPEC NAA+PROBE-LOG#: 3.6 {LOG_COPIES}/ML
BUN SERPL-MCNC: 102 MG/DL (ref 8–23)
BUN SERPL-MCNC: 108 MG/DL (ref 8–23)
BUN SERPL-MCNC: 108 MG/DL (ref 8–23)
BUN SERPL-MCNC: 109 MG/DL (ref 8–23)
BUN SERPL-MCNC: 110 MG/DL (ref 8–23)
BUN SERPL-MCNC: 112 MG/DL (ref 8–23)
BUN SERPL-MCNC: 113 MG/DL (ref 8–23)
BUN SERPL-MCNC: 117 MG/DL (ref 8–23)
BUN SERPL-MCNC: 121 MG/DL (ref 8–23)
BUN SERPL-MCNC: 121 MG/DL (ref 8–23)
BUN SERPL-MCNC: 140 MG/DL (ref 8–23)
BUN SERPL-MCNC: 147 MG/DL (ref 8–23)
BUN SERPL-MCNC: 22.8 MG/DL (ref 8–23)
BUN SERPL-MCNC: 28.1 MG/DL (ref 8–23)
BUN SERPL-MCNC: 29.6 MG/DL (ref 8–23)
BUN SERPL-MCNC: 30.1 MG/DL (ref 8–23)
BUN SERPL-MCNC: 34.6 MG/DL (ref 8–23)
BUN SERPL-MCNC: 35.3 MG/DL (ref 8–23)
BUN SERPL-MCNC: 40.9 MG/DL (ref 8–23)
BUN SERPL-MCNC: 41 MG/DL (ref 8–23)
BUN SERPL-MCNC: 43.2 MG/DL (ref 8–23)
BUN SERPL-MCNC: 45.9 MG/DL (ref 8–23)
BUN SERPL-MCNC: 48.3 MG/DL (ref 8–23)
BUN SERPL-MCNC: 48.4 MG/DL (ref 8–23)
BUN SERPL-MCNC: 50.3 MG/DL (ref 8–23)
BUN SERPL-MCNC: 50.3 MG/DL (ref 8–23)
BUN SERPL-MCNC: 53.2 MG/DL (ref 8–23)
BUN SERPL-MCNC: 56.1 MG/DL (ref 8–23)
BUN SERPL-MCNC: 57.2 MG/DL (ref 8–23)
BUN SERPL-MCNC: 61.2 MG/DL (ref 8–23)
BUN SERPL-MCNC: 62.5 MG/DL (ref 8–23)
BUN SERPL-MCNC: 64.6 MG/DL (ref 8–23)
BUN SERPL-MCNC: 66.9 MG/DL (ref 8–23)
BUN SERPL-MCNC: 68.4 MG/DL (ref 8–23)
BUN SERPL-MCNC: 70.4 MG/DL (ref 8–23)
BUN SERPL-MCNC: 70.4 MG/DL (ref 8–23)
BUN SERPL-MCNC: 71.2 MG/DL (ref 8–23)
BUN SERPL-MCNC: 74.7 MG/DL (ref 8–23)
BUN SERPL-MCNC: 76.2 MG/DL (ref 8–23)
BUN SERPL-MCNC: 76.3 MG/DL (ref 8–23)
BUN SERPL-MCNC: 78 MG/DL (ref 8–23)
BUN SERPL-MCNC: 78.3 MG/DL (ref 8–23)
BUN SERPL-MCNC: 80.1 MG/DL (ref 8–23)
BUN SERPL-MCNC: 82.2 MG/DL (ref 8–23)
BUN SERPL-MCNC: 83 MG/DL (ref 8–23)
BUN SERPL-MCNC: 84.5 MG/DL (ref 8–23)
BUN SERPL-MCNC: 84.9 MG/DL (ref 8–23)
BUN SERPL-MCNC: 86.2 MG/DL (ref 8–23)
BUN SERPL-MCNC: 86.8 MG/DL (ref 8–23)
BUN SERPL-MCNC: 90.3 MG/DL (ref 8–23)
BUN SERPL-MCNC: 91.2 MG/DL (ref 8–23)
BUN SERPL-MCNC: 92.2 MG/DL (ref 8–23)
BUN SERPL-MCNC: 94.9 MG/DL (ref 8–23)
BUN SERPL-MCNC: 99.7 MG/DL (ref 8–23)
BW-1: NORMAL
BW-2: NORMAL
C PEPTIDE SERPL-MCNC: 6.1 NG/ML (ref 0.9–6.9)
C PNEUM DNA SPEC QL NAA+PROBE: NOT DETECTED
C PNEUM DNA SPEC QL NAA+PROBE: NOT DETECTED
C*: NORMAL
C*LOCUS SEROLOGIC EQUIVALENT: 6
C*LOCUS: NORMAL
C*SEROLOGIC EQUIVALENT: 7
C-ANCA TITR SER IF: NORMAL {TITER}
C3 SERPL-MCNC: 93 MG/DL (ref 81–157)
C4 SERPL-MCNC: 26 MG/DL (ref 13–39)
CA-I BLD-MCNC: 4.1 MG/DL (ref 4.4–5.2)
CA-I BLD-MCNC: 4.2 MG/DL (ref 4.4–5.2)
CA-I BLD-MCNC: 4.2 MG/DL (ref 4.4–5.2)
CA-I BLD-MCNC: 4.3 MG/DL (ref 4.4–5.2)
CA-I BLD-MCNC: 4.4 MG/DL (ref 4.4–5.2)
CALCIUM SERPL-MCNC: 7.1 MG/DL (ref 8.8–10.2)
CALCIUM SERPL-MCNC: 7.6 MG/DL (ref 8.8–10.2)
CALCIUM SERPL-MCNC: 7.7 MG/DL (ref 8.8–10.2)
CALCIUM SERPL-MCNC: 7.8 MG/DL (ref 8.8–10.2)
CALCIUM SERPL-MCNC: 8 MG/DL (ref 8.8–10.2)
CALCIUM SERPL-MCNC: 8.2 MG/DL (ref 8.8–10.4)
CALCIUM SERPL-MCNC: 8.2 MG/DL (ref 8.8–10.4)
CALCIUM SERPL-MCNC: 8.3 MG/DL (ref 8.8–10.2)
CALCIUM SERPL-MCNC: 8.4 MG/DL (ref 8.8–10.2)
CALCIUM SERPL-MCNC: 8.4 MG/DL (ref 8.8–10.4)
CALCIUM SERPL-MCNC: 8.4 MG/DL (ref 8.8–10.4)
CALCIUM SERPL-MCNC: 8.5 MG/DL (ref 8.8–10.2)
CALCIUM SERPL-MCNC: 8.5 MG/DL (ref 8.8–10.4)
CALCIUM SERPL-MCNC: 8.6 MG/DL (ref 8.8–10.2)
CALCIUM SERPL-MCNC: 8.6 MG/DL (ref 8.8–10.4)
CALCIUM SERPL-MCNC: 8.6 MG/DL (ref 8.8–10.4)
CALCIUM SERPL-MCNC: 8.7 MG/DL (ref 8.8–10.2)
CALCIUM SERPL-MCNC: 8.7 MG/DL (ref 8.8–10.4)
CALCIUM SERPL-MCNC: 8.8 MG/DL (ref 8.8–10.2)
CALCIUM SERPL-MCNC: 8.8 MG/DL (ref 8.8–10.4)
CALCIUM SERPL-MCNC: 8.9 MG/DL (ref 8.8–10.2)
CALCIUM SERPL-MCNC: 8.9 MG/DL (ref 8.8–10.4)
CALCIUM SERPL-MCNC: 9 MG/DL (ref 8.8–10.2)
CALCIUM SERPL-MCNC: 9 MG/DL (ref 8.8–10.2)
CALCIUM SERPL-MCNC: 9 MG/DL (ref 8.8–10.4)
CALCIUM SERPL-MCNC: 9.1 MG/DL (ref 8.8–10.2)
CALCIUM SERPL-MCNC: 9.1 MG/DL (ref 8.8–10.2)
CALCIUM SERPL-MCNC: 9.2 MG/DL (ref 8.8–10.4)
CALCIUM SERPL-MCNC: 9.2 MG/DL (ref 8.8–10.4)
CALCIUM SERPL-MCNC: 9.4 MG/DL (ref 8.8–10.4)
CARDIOLIPIN IGG SER IA-ACNC: 4.7 GPL-U/ML
CARDIOLIPIN IGG SER IA-ACNC: NEGATIVE
CARDIOLIPIN IGM SER IA-ACNC: 3.5 MPL-U/ML
CARDIOLIPIN IGM SER IA-ACNC: NEGATIVE
CHLORIDE SERPL-SCNC: 100 MMOL/L (ref 98–107)
CHLORIDE SERPL-SCNC: 100 MMOL/L (ref 98–107)
CHLORIDE SERPL-SCNC: 101 MMOL/L (ref 98–107)
CHLORIDE SERPL-SCNC: 102 MMOL/L (ref 98–107)
CHLORIDE SERPL-SCNC: 103 MMOL/L (ref 98–107)
CHLORIDE SERPL-SCNC: 104 MMOL/L (ref 98–107)
CHLORIDE SERPL-SCNC: 105 MMOL/L (ref 98–107)
CHLORIDE SERPL-SCNC: 106 MMOL/L (ref 98–107)
CHLORIDE SERPL-SCNC: 107 MMOL/L (ref 98–107)
CHLORIDE SERPL-SCNC: 108 MMOL/L (ref 98–107)
CHLORIDE SERPL-SCNC: 109 MMOL/L (ref 98–107)
CHLORIDE SERPL-SCNC: 111 MMOL/L (ref 98–107)
CHLORIDE SERPL-SCNC: 115 MMOL/L (ref 98–107)
CHLORIDE SERPL-SCNC: 90 MMOL/L (ref 98–107)
CHLORIDE SERPL-SCNC: 91 MMOL/L (ref 98–107)
CHLORIDE SERPL-SCNC: 93 MMOL/L (ref 98–107)
CHLORIDE SERPL-SCNC: 93 MMOL/L (ref 98–107)
CHLORIDE SERPL-SCNC: 94 MMOL/L (ref 98–107)
CHLORIDE SERPL-SCNC: 94 MMOL/L (ref 98–107)
CHLORIDE SERPL-SCNC: 95 MMOL/L (ref 98–107)
CHLORIDE SERPL-SCNC: 95 MMOL/L (ref 98–107)
CHLORIDE SERPL-SCNC: 96 MMOL/L (ref 98–107)
CHLORIDE SERPL-SCNC: 97 MMOL/L (ref 98–107)
CHLORIDE SERPL-SCNC: 97 MMOL/L (ref 98–107)
CHLORIDE SERPL-SCNC: 98 MMOL/L (ref 98–107)
CHLORIDE SERPL-SCNC: 99 MMOL/L (ref 98–107)
CHOLEST SERPL-MCNC: 112 MG/DL
CMV IGG SERPL IA-ACNC: >10 U/ML
CMV IGG SERPL IA-ACNC: ABNORMAL
COLONOSCOPY: NORMAL
COLOR UR AUTO: ABNORMAL
COLOR UR AUTO: NORMAL
COLOR UR AUTO: YELLOW
COLOR UR AUTO: YELLOW
CORTIS SERPL-MCNC: 8.8 UG/DL
CREAT SERPL-MCNC: 1.42 MG/DL (ref 0.67–1.17)
CREAT SERPL-MCNC: 1.43 MG/DL (ref 0.67–1.17)
CREAT SERPL-MCNC: 1.47 MG/DL (ref 0.67–1.17)
CREAT SERPL-MCNC: 1.58 MG/DL (ref 0.67–1.17)
CREAT SERPL-MCNC: 1.63 MG/DL (ref 0.67–1.17)
CREAT SERPL-MCNC: 1.75 MG/DL (ref 0.67–1.17)
CREAT SERPL-MCNC: 1.83 MG/DL (ref 0.67–1.17)
CREAT SERPL-MCNC: 2.05 MG/DL (ref 0.67–1.17)
CREAT SERPL-MCNC: 2.74 MG/DL (ref 0.67–1.17)
CREAT SERPL-MCNC: 2.81 MG/DL (ref 0.67–1.17)
CREAT SERPL-MCNC: 2.84 MG/DL (ref 0.67–1.17)
CREAT SERPL-MCNC: 2.85 MG/DL (ref 0.67–1.17)
CREAT SERPL-MCNC: 2.86 MG/DL (ref 0.67–1.17)
CREAT SERPL-MCNC: 2.87 MG/DL (ref 0.67–1.17)
CREAT SERPL-MCNC: 2.89 MG/DL (ref 0.67–1.17)
CREAT SERPL-MCNC: 2.92 MG/DL (ref 0.67–1.17)
CREAT SERPL-MCNC: 2.92 MG/DL (ref 0.67–1.17)
CREAT SERPL-MCNC: 2.96 MG/DL (ref 0.67–1.17)
CREAT SERPL-MCNC: 2.97 MG/DL (ref 0.67–1.17)
CREAT SERPL-MCNC: 3.04 MG/DL (ref 0.67–1.17)
CREAT SERPL-MCNC: 3.05 MG/DL (ref 0.67–1.17)
CREAT SERPL-MCNC: 3.05 MG/DL (ref 0.67–1.17)
CREAT SERPL-MCNC: 3.07 MG/DL (ref 0.67–1.17)
CREAT SERPL-MCNC: 3.19 MG/DL (ref 0.67–1.17)
CREAT SERPL-MCNC: 3.3 MG/DL (ref 0.67–1.17)
CREAT SERPL-MCNC: 3.8 MG/DL (ref 0.67–1.17)
CREAT SERPL-MCNC: 3.81 MG/DL (ref 0.67–1.17)
CREAT SERPL-MCNC: 3.82 MG/DL (ref 0.67–1.17)
CREAT SERPL-MCNC: 3.86 MG/DL (ref 0.67–1.17)
CREAT SERPL-MCNC: 3.95 MG/DL (ref 0.67–1.17)
CREAT SERPL-MCNC: 4.22 MG/DL (ref 0.67–1.17)
CREAT SERPL-MCNC: 4.48 MG/DL (ref 0.67–1.17)
CREAT SERPL-MCNC: 4.5 MG/DL (ref 0.67–1.17)
CREAT SERPL-MCNC: 4.54 MG/DL (ref 0.67–1.17)
CREAT SERPL-MCNC: 4.55 MG/DL (ref 0.67–1.17)
CREAT SERPL-MCNC: 4.62 MG/DL (ref 0.67–1.17)
CREAT SERPL-MCNC: 4.65 MG/DL (ref 0.67–1.17)
CREAT SERPL-MCNC: 4.68 MG/DL (ref 0.67–1.17)
CREAT SERPL-MCNC: 4.75 MG/DL (ref 0.67–1.17)
CREAT SERPL-MCNC: 4.78 MG/DL (ref 0.67–1.17)
CREAT SERPL-MCNC: 4.84 MG/DL (ref 0.67–1.17)
CREAT SERPL-MCNC: 4.97 MG/DL (ref 0.67–1.17)
CREAT SERPL-MCNC: 4.99 MG/DL (ref 0.67–1.17)
CREAT SERPL-MCNC: 5.03 MG/DL (ref 0.67–1.17)
CREAT SERPL-MCNC: 5.11 MG/DL (ref 0.67–1.17)
CREAT SERPL-MCNC: 5.2 MG/DL (ref 0.67–1.17)
CREAT SERPL-MCNC: 5.25 MG/DL (ref 0.67–1.17)
CREAT SERPL-MCNC: 5.31 MG/DL (ref 0.67–1.17)
CREAT SERPL-MCNC: 5.33 MG/DL (ref 0.67–1.17)
CREAT SERPL-MCNC: 5.6 MG/DL (ref 0.67–1.17)
CREAT SERPL-MCNC: 5.7 MG/DL (ref 0.67–1.17)
CREAT SERPL-MCNC: 5.75 MG/DL (ref 0.67–1.17)
CREAT SERPL-MCNC: 5.93 MG/DL (ref 0.67–1.17)
CREAT SERPL-MCNC: 6.15 MG/DL (ref 0.67–1.17)
CREAT UR-MCNC: 138 MG/DL
CREAT UR-MCNC: 184 MG/DL
CREAT UR-MCNC: 54 MG/DL
CREAT UR-MCNC: 57.4 MG/DL
CREAT UR-MCNC: 57.4 MG/DL
CREAT UR-MCNC: 78.6 MG/DL
CREAT UR-MCNC: 96.9 MG/DL
CYSTATIN C (ROCHE): 4.3 MG/L (ref 0.6–1)
D DIMER PPP FEU-MCNC: 2.11 UG/ML FEU (ref 0–0.5)
DEPRECATED HCO3 PLAS-SCNC: 16 MMOL/L (ref 22–29)
DEPRECATED HCO3 PLAS-SCNC: 17 MMOL/L (ref 22–29)
DEPRECATED HCO3 PLAS-SCNC: 17 MMOL/L (ref 22–29)
DEPRECATED HCO3 PLAS-SCNC: 18 MMOL/L (ref 22–29)
DEPRECATED HCO3 PLAS-SCNC: 19 MMOL/L (ref 22–29)
DEPRECATED HCO3 PLAS-SCNC: 20 MMOL/L (ref 22–29)
DEPRECATED HCO3 PLAS-SCNC: 21 MMOL/L (ref 22–29)
DEPRECATED HCO3 PLAS-SCNC: 23 MMOL/L (ref 22–29)
DEPRECATED HCO3 PLAS-SCNC: 23 MMOL/L (ref 22–29)
DEPRECATED HCO3 PLAS-SCNC: 24 MMOL/L (ref 22–29)
DIASTOLIC BLOOD PRESSURE - MUSE: NORMAL MMHG
DONOR IDENTIFICATION: NORMAL
DPA1*: NORMAL
DPB1*: NORMAL
DQA1*: NORMAL
DQA1*LOCUS: NORMAL
DQB1*: NORMAL
DQB1*LOCUS SEROLOGIC EQUIVALENT: 2
DQB1*LOCUS: NORMAL
DQB1*SEROLOGIC EQUIVALENT: 6
DQB9: 2079
DRB1*: NORMAL
DRB1*LOCUS SEROLOGIC EQUIVALENT: 17
DRB1*LOCUS: NORMAL
DRB1*SEROLOGIC EQUIVALENT: 15
DRB3*LOCUS SEROLOGIC EQUIVALENT: 52
DRB3*LOCUS: NORMAL
DRB5*: NORMAL
DRB5*SEROLOGIC EQUIVALENT: 51
DRSSO TEST METHOD: NORMAL
DRVVT CONFIRM NORMALIZED RATIO: 1.04
DRVVT SCREEN MIX RATIO: 0.95
DRVVT SCREEN RATIO: 1.17
DSA COMMENTS: NORMAL
DSA PRESENT: YES
DSA TEST METHOD: NORMAL
DSDNA AB SER-ACNC: 1.7 IU/ML
EBV DNA SERPL NAA+PROBE-ACNC: NOT DETECTED IU/ML
EBV VCA IGG SER IA-ACNC: 650 U/ML
EBV VCA IGG SER IA-ACNC: POSITIVE
EGFRCR SERPLBLD CKD-EPI 2021: 10 ML/MIN/1.73M2
EGFRCR SERPLBLD CKD-EPI 2021: 11 ML/MIN/1.73M2
EGFRCR SERPLBLD CKD-EPI 2021: 12 ML/MIN/1.73M2
EGFRCR SERPLBLD CKD-EPI 2021: 13 ML/MIN/1.73M2
EGFRCR SERPLBLD CKD-EPI 2021: 14 ML/MIN/1.73M2
EGFRCR SERPLBLD CKD-EPI 2021: 14 ML/MIN/1.73M2
EGFRCR SERPLBLD CKD-EPI 2021: 15 ML/MIN/1.73M2
EGFRCR SERPLBLD CKD-EPI 2021: 16 ML/MIN/1.73M2
EGFRCR SERPLBLD CKD-EPI 2021: 16 ML/MIN/1.73M2
EGFRCR SERPLBLD CKD-EPI 2021: 17 ML/MIN/1.73M2
EGFRCR SERPLBLD CKD-EPI 2021: 20 ML/MIN/1.73M2
EGFRCR SERPLBLD CKD-EPI 2021: 21 ML/MIN/1.73M2
EGFRCR SERPLBLD CKD-EPI 2021: 22 ML/MIN/1.73M2
EGFRCR SERPLBLD CKD-EPI 2021: 23 ML/MIN/1.73M2
EGFRCR SERPLBLD CKD-EPI 2021: 24 ML/MIN/1.73M2
EGFRCR SERPLBLD CKD-EPI 2021: 25 ML/MIN/1.73M2
EGFRCR SERPLBLD CKD-EPI 2021: 35 ML/MIN/1.73M2
EGFRCR SERPLBLD CKD-EPI 2021: 40 ML/MIN/1.73M2
EGFRCR SERPLBLD CKD-EPI 2021: 42 ML/MIN/1.73M2
EGFRCR SERPLBLD CKD-EPI 2021: 46 ML/MIN/1.73M2
EGFRCR SERPLBLD CKD-EPI 2021: 48 ML/MIN/1.73M2
EGFRCR SERPLBLD CKD-EPI 2021: 52 ML/MIN/1.73M2
EGFRCR SERPLBLD CKD-EPI 2021: 54 ML/MIN/1.73M2
EGFRCR SERPLBLD CKD-EPI 2021: 54 ML/MIN/1.73M2
EGFRCR SERPLBLD CKD-EPI 2021: 9 ML/MIN/1.73M2
EOSINOPHIL # BLD AUTO: 0 10E3/UL (ref 0–0.7)
EOSINOPHIL # BLD AUTO: 0 10E3/UL (ref 0–0.7)
EOSINOPHIL # BLD AUTO: 0.1 10E3/UL (ref 0–0.7)
EOSINOPHIL # BLD AUTO: 0.1 10E3/UL (ref 0–0.7)
EOSINOPHIL # BLD AUTO: 0.2 10E3/UL (ref 0–0.7)
EOSINOPHIL # BLD AUTO: 0.3 10E3/UL (ref 0–0.7)
EOSINOPHIL # BLD AUTO: 0.5 10E3/UL (ref 0–0.7)
EOSINOPHIL # BLD AUTO: 0.6 10E3/UL (ref 0–0.7)
EOSINOPHIL # BLD AUTO: 0.7 10E3/UL (ref 0–0.7)
EOSINOPHIL # BLD AUTO: 0.8 10E3/UL (ref 0–0.7)
EOSINOPHIL # BLD AUTO: 0.9 10E3/UL (ref 0–0.7)
EOSINOPHIL # BLD AUTO: 1 10E3/UL (ref 0–0.7)
EOSINOPHIL NFR BLD AUTO: 0 %
EOSINOPHIL NFR BLD AUTO: 0 %
EOSINOPHIL NFR BLD AUTO: 11 %
EOSINOPHIL NFR BLD AUTO: 11 %
EOSINOPHIL NFR BLD AUTO: 12 %
EOSINOPHIL NFR BLD AUTO: 13 %
EOSINOPHIL NFR BLD AUTO: 13 %
EOSINOPHIL NFR BLD AUTO: 15 %
EOSINOPHIL NFR BLD AUTO: 16 %
EOSINOPHIL NFR BLD AUTO: 2 %
EOSINOPHIL NFR BLD AUTO: 3 %
EOSINOPHIL NFR BLD AUTO: 8 %
EOSINOPHIL NFR BLD AUTO: 9 %
ERYTHROCYTE [DISTWIDTH] IN BLOOD BY AUTOMATED COUNT: 13.3 % (ref 10–15)
ERYTHROCYTE [DISTWIDTH] IN BLOOD BY AUTOMATED COUNT: 13.4 % (ref 10–15)
ERYTHROCYTE [DISTWIDTH] IN BLOOD BY AUTOMATED COUNT: 13.5 % (ref 10–15)
ERYTHROCYTE [DISTWIDTH] IN BLOOD BY AUTOMATED COUNT: 13.6 % (ref 10–15)
ERYTHROCYTE [DISTWIDTH] IN BLOOD BY AUTOMATED COUNT: 13.7 % (ref 10–15)
ERYTHROCYTE [DISTWIDTH] IN BLOOD BY AUTOMATED COUNT: 13.8 % (ref 10–15)
ERYTHROCYTE [DISTWIDTH] IN BLOOD BY AUTOMATED COUNT: 13.9 % (ref 10–15)
ERYTHROCYTE [DISTWIDTH] IN BLOOD BY AUTOMATED COUNT: 14 % (ref 10–15)
ERYTHROCYTE [DISTWIDTH] IN BLOOD BY AUTOMATED COUNT: 14.1 % (ref 10–15)
ERYTHROCYTE [DISTWIDTH] IN BLOOD BY AUTOMATED COUNT: 14.2 % (ref 10–15)
ERYTHROCYTE [DISTWIDTH] IN BLOOD BY AUTOMATED COUNT: 14.4 % (ref 10–15)
ERYTHROCYTE [DISTWIDTH] IN BLOOD BY AUTOMATED COUNT: 14.5 % (ref 10–15)
ERYTHROCYTE [DISTWIDTH] IN BLOOD BY AUTOMATED COUNT: 14.6 % (ref 10–15)
ERYTHROCYTE [DISTWIDTH] IN BLOOD BY AUTOMATED COUNT: 14.6 % (ref 10–15)
ERYTHROCYTE [DISTWIDTH] IN BLOOD BY AUTOMATED COUNT: 14.7 % (ref 10–15)
ERYTHROCYTE [DISTWIDTH] IN BLOOD BY AUTOMATED COUNT: 14.7 % (ref 10–15)
ERYTHROCYTE [DISTWIDTH] IN BLOOD BY AUTOMATED COUNT: 15.4 % (ref 10–15)
FASTING STATUS PATIENT QL REPORTED: NO
FERRITIN SERPL-MCNC: 151 NG/ML (ref 31–409)
FERRITIN SERPL-MCNC: 24 NG/ML (ref 31–409)
FERRITIN SERPL-MCNC: 241 NG/ML (ref 31–409)
FLUAV H1 2009 PAND RNA SPEC QL NAA+PROBE: NOT DETECTED
FLUAV H1 2009 PAND RNA SPEC QL NAA+PROBE: NOT DETECTED
FLUAV H1 RNA SPEC QL NAA+PROBE: NOT DETECTED
FLUAV H1 RNA SPEC QL NAA+PROBE: NOT DETECTED
FLUAV H3 RNA SPEC QL NAA+PROBE: NOT DETECTED
FLUAV H3 RNA SPEC QL NAA+PROBE: NOT DETECTED
FLUAV RNA SPEC QL NAA+PROBE: NEGATIVE
FLUAV RNA SPEC QL NAA+PROBE: NEGATIVE
FLUAV RNA SPEC QL NAA+PROBE: NOT DETECTED
FLUAV RNA SPEC QL NAA+PROBE: NOT DETECTED
FLUBV RNA RESP QL NAA+PROBE: NEGATIVE
FLUBV RNA RESP QL NAA+PROBE: NEGATIVE
FLUBV RNA SPEC QL NAA+PROBE: NOT DETECTED
FLUBV RNA SPEC QL NAA+PROBE: NOT DETECTED
GAMMA GLOB SERPL ELPH-MCNC: 0.8 G/DL (ref 0.7–1.6)
GAMMA INTERFERON BACKGROUND BLD IA-ACNC: 0.04 IU/ML
GAMMA INTERFERON BACKGROUND BLD IA-ACNC: 0.06 IU/ML
GFR/BSA.PRED SERPLBLD CYS-BASED-ARV: 11 ML/MIN/1.73M2
GLUCOSE BLD-MCNC: 203 MG/DL (ref 70–99)
GLUCOSE BLD-MCNC: 206 MG/DL (ref 70–99)
GLUCOSE BLD-MCNC: 208 MG/DL (ref 70–99)
GLUCOSE BLDC GLUCOMTR-MCNC: 108 MG/DL (ref 70–99)
GLUCOSE BLDC GLUCOMTR-MCNC: 108 MG/DL (ref 70–99)
GLUCOSE BLDC GLUCOMTR-MCNC: 115 MG/DL (ref 70–99)
GLUCOSE BLDC GLUCOMTR-MCNC: 115 MG/DL (ref 70–99)
GLUCOSE BLDC GLUCOMTR-MCNC: 120 MG/DL (ref 70–99)
GLUCOSE BLDC GLUCOMTR-MCNC: 123 MG/DL (ref 70–99)
GLUCOSE BLDC GLUCOMTR-MCNC: 132 MG/DL (ref 70–99)
GLUCOSE BLDC GLUCOMTR-MCNC: 132 MG/DL (ref 70–99)
GLUCOSE BLDC GLUCOMTR-MCNC: 138 MG/DL (ref 70–99)
GLUCOSE BLDC GLUCOMTR-MCNC: 142 MG/DL (ref 70–99)
GLUCOSE BLDC GLUCOMTR-MCNC: 144 MG/DL (ref 70–99)
GLUCOSE BLDC GLUCOMTR-MCNC: 147 MG/DL (ref 70–99)
GLUCOSE BLDC GLUCOMTR-MCNC: 155 MG/DL (ref 70–99)
GLUCOSE BLDC GLUCOMTR-MCNC: 157 MG/DL (ref 70–99)
GLUCOSE BLDC GLUCOMTR-MCNC: 159 MG/DL (ref 70–99)
GLUCOSE BLDC GLUCOMTR-MCNC: 159 MG/DL (ref 70–99)
GLUCOSE BLDC GLUCOMTR-MCNC: 162 MG/DL (ref 70–99)
GLUCOSE BLDC GLUCOMTR-MCNC: 163 MG/DL (ref 70–99)
GLUCOSE BLDC GLUCOMTR-MCNC: 166 MG/DL (ref 70–99)
GLUCOSE BLDC GLUCOMTR-MCNC: 166 MG/DL (ref 70–99)
GLUCOSE BLDC GLUCOMTR-MCNC: 169 MG/DL (ref 70–99)
GLUCOSE BLDC GLUCOMTR-MCNC: 170 MG/DL (ref 70–99)
GLUCOSE BLDC GLUCOMTR-MCNC: 182 MG/DL (ref 70–99)
GLUCOSE BLDC GLUCOMTR-MCNC: 182 MG/DL (ref 70–99)
GLUCOSE BLDC GLUCOMTR-MCNC: 187 MG/DL (ref 70–99)
GLUCOSE BLDC GLUCOMTR-MCNC: 195 MG/DL (ref 70–99)
GLUCOSE BLDC GLUCOMTR-MCNC: 195 MG/DL (ref 70–99)
GLUCOSE BLDC GLUCOMTR-MCNC: 196 MG/DL (ref 70–99)
GLUCOSE BLDC GLUCOMTR-MCNC: 196 MG/DL (ref 70–99)
GLUCOSE BLDC GLUCOMTR-MCNC: 204 MG/DL (ref 70–99)
GLUCOSE BLDC GLUCOMTR-MCNC: 91 MG/DL (ref 70–99)
GLUCOSE SERPL-MCNC: 100 MG/DL (ref 70–99)
GLUCOSE SERPL-MCNC: 103 MG/DL (ref 70–99)
GLUCOSE SERPL-MCNC: 103 MG/DL (ref 70–99)
GLUCOSE SERPL-MCNC: 105 MG/DL (ref 70–99)
GLUCOSE SERPL-MCNC: 107 MG/DL (ref 70–99)
GLUCOSE SERPL-MCNC: 110 MG/DL (ref 70–99)
GLUCOSE SERPL-MCNC: 111 MG/DL (ref 70–99)
GLUCOSE SERPL-MCNC: 111 MG/DL (ref 70–99)
GLUCOSE SERPL-MCNC: 112 MG/DL (ref 70–99)
GLUCOSE SERPL-MCNC: 121 MG/DL (ref 70–99)
GLUCOSE SERPL-MCNC: 121 MG/DL (ref 70–99)
GLUCOSE SERPL-MCNC: 122 MG/DL (ref 70–99)
GLUCOSE SERPL-MCNC: 124 MG/DL (ref 70–99)
GLUCOSE SERPL-MCNC: 127 MG/DL (ref 70–99)
GLUCOSE SERPL-MCNC: 130 MG/DL (ref 70–99)
GLUCOSE SERPL-MCNC: 139 MG/DL (ref 70–99)
GLUCOSE SERPL-MCNC: 143 MG/DL (ref 70–99)
GLUCOSE SERPL-MCNC: 145 MG/DL (ref 70–99)
GLUCOSE SERPL-MCNC: 145 MG/DL (ref 70–99)
GLUCOSE SERPL-MCNC: 147 MG/DL (ref 70–99)
GLUCOSE SERPL-MCNC: 151 MG/DL (ref 70–99)
GLUCOSE SERPL-MCNC: 154 MG/DL (ref 70–99)
GLUCOSE SERPL-MCNC: 155 MG/DL (ref 70–99)
GLUCOSE SERPL-MCNC: 176 MG/DL (ref 70–99)
GLUCOSE SERPL-MCNC: 188 MG/DL (ref 70–99)
GLUCOSE SERPL-MCNC: 191 MG/DL (ref 70–99)
GLUCOSE SERPL-MCNC: 199 MG/DL (ref 70–99)
GLUCOSE SERPL-MCNC: 205 MG/DL (ref 70–99)
GLUCOSE SERPL-MCNC: 211 MG/DL (ref 70–99)
GLUCOSE SERPL-MCNC: 214 MG/DL (ref 70–99)
GLUCOSE SERPL-MCNC: 219 MG/DL (ref 70–99)
GLUCOSE SERPL-MCNC: 249 MG/DL (ref 70–99)
GLUCOSE SERPL-MCNC: 63 MG/DL (ref 70–99)
GLUCOSE SERPL-MCNC: 75 MG/DL (ref 70–99)
GLUCOSE SERPL-MCNC: 85 MG/DL (ref 70–99)
GLUCOSE SERPL-MCNC: 87 MG/DL (ref 70–99)
GLUCOSE SERPL-MCNC: 91 MG/DL (ref 70–99)
GLUCOSE SERPL-MCNC: 92 MG/DL (ref 70–99)
GLUCOSE SERPL-MCNC: 94 MG/DL (ref 70–99)
GLUCOSE SERPL-MCNC: 94 MG/DL (ref 70–99)
GLUCOSE SERPL-MCNC: 95 MG/DL (ref 70–99)
GLUCOSE SERPL-MCNC: 95 MG/DL (ref 70–99)
GLUCOSE SERPL-MCNC: 97 MG/DL (ref 70–99)
GLUCOSE SERPL-MCNC: 98 MG/DL (ref 70–99)
GLUCOSE SERPL-MCNC: 99 MG/DL (ref 70–99)
GLUCOSE UR STRIP-MCNC: NEGATIVE MG/DL
GRAM STAIN RESULT: ABNORMAL
HADV DNA SPEC QL NAA+PROBE: NOT DETECTED
HADV DNA SPEC QL NAA+PROBE: NOT DETECTED
HAPTOGLOB SERPL-MCNC: 59 MG/DL (ref 30–200)
HBA1C MFR BLD: 5.4 %
HBA1C MFR BLD: 5.6 % (ref 0–5.6)
HBA1C MFR BLD: 5.8 % (ref 0–5.6)
HBA1C MFR BLD: 5.8 % (ref 0–5.6)
HBV CORE AB SERPL QL IA: REACTIVE
HBV CORE AB SERPL QL IA: REACTIVE
HBV CORE IGM SERPL QL IA: NONREACTIVE
HBV CORE IGM SERPL QL IA: NONREACTIVE
HBV SURFACE AB SERPL IA-ACNC: <3.5 M[IU]/ML
HBV SURFACE AB SERPL IA-ACNC: <3.5 M[IU]/ML
HBV SURFACE AB SERPL IA-ACNC: NONREACTIVE M[IU]/ML
HBV SURFACE AB SERPL IA-ACNC: NONREACTIVE M[IU]/ML
HBV SURFACE AG SERPL QL IA: NONREACTIVE
HBV SURFACE AG SERPL QL IA: NONREACTIVE
HCO3 BLD-SCNC: 18 MMOL/L (ref 21–28)
HCO3 BLD-SCNC: 18 MMOL/L (ref 21–28)
HCO3 BLD-SCNC: 19 MMOL/L (ref 21–28)
HCO3 BLD-SCNC: 20 MMOL/L (ref 21–28)
HCO3 BLDA-SCNC: 20 MMOL/L (ref 21–28)
HCO3 BLDA-SCNC: 21 MMOL/L (ref 21–28)
HCO3 BLDA-SCNC: 21 MMOL/L (ref 21–28)
HCO3 SERPL-SCNC: 19 MMOL/L (ref 22–29)
HCO3 SERPL-SCNC: 20 MMOL/L (ref 22–29)
HCO3 SERPL-SCNC: 21 MMOL/L (ref 22–29)
HCO3 SERPL-SCNC: 22 MMOL/L (ref 22–29)
HCO3 SERPL-SCNC: 23 MMOL/L (ref 22–29)
HCO3 SERPL-SCNC: 24 MMOL/L (ref 22–29)
HCO3 SERPL-SCNC: 24 MMOL/L (ref 22–29)
HCO3 SERPL-SCNC: 26 MMOL/L (ref 22–29)
HCO3 SERPL-SCNC: 28 MMOL/L (ref 22–29)
HCOV PNL SPEC NAA+PROBE: NOT DETECTED
HCOV PNL SPEC NAA+PROBE: NOT DETECTED
HCT VFR BLD AUTO: 30.9 % (ref 40–53)
HCT VFR BLD AUTO: 31.5 % (ref 40–53)
HCT VFR BLD AUTO: 32 % (ref 40–53)
HCT VFR BLD AUTO: 32.1 % (ref 40–53)
HCT VFR BLD AUTO: 32.3 % (ref 40–53)
HCT VFR BLD AUTO: 32.4 % (ref 40–53)
HCT VFR BLD AUTO: 32.5 % (ref 40–53)
HCT VFR BLD AUTO: 32.6 % (ref 40–53)
HCT VFR BLD AUTO: 32.7 % (ref 40–53)
HCT VFR BLD AUTO: 33.1 % (ref 40–53)
HCT VFR BLD AUTO: 33.3 % (ref 40–53)
HCT VFR BLD AUTO: 33.7 % (ref 40–53)
HCT VFR BLD AUTO: 33.8 % (ref 40–53)
HCT VFR BLD AUTO: 33.8 % (ref 40–53)
HCT VFR BLD AUTO: 34 % (ref 40–53)
HCT VFR BLD AUTO: 34.2 % (ref 40–53)
HCT VFR BLD AUTO: 34.4 % (ref 40–53)
HCT VFR BLD AUTO: 34.7 % (ref 40–53)
HCT VFR BLD AUTO: 34.7 % (ref 40–53)
HCT VFR BLD AUTO: 34.9 % (ref 40–53)
HCT VFR BLD AUTO: 35.5 % (ref 40–53)
HCT VFR BLD AUTO: 35.7 % (ref 40–53)
HCT VFR BLD AUTO: 35.8 % (ref 40–53)
HCT VFR BLD AUTO: 36.1 % (ref 40–53)
HCT VFR BLD AUTO: 36.6 % (ref 40–53)
HCT VFR BLD AUTO: 37.5 % (ref 40–53)
HCT VFR BLD AUTO: 37.9 % (ref 40–53)
HCT VFR BLD AUTO: 39.3 % (ref 40–53)
HCT VFR BLD AUTO: 40.2 % (ref 40–53)
HCT VFR BLD AUTO: 40.3 % (ref 40–53)
HCV AB SERPL QL IA: NONREACTIVE
HDLC SERPL-MCNC: 65 MG/DL
HGB BLD-MCNC: 10 G/DL (ref 13.3–17.7)
HGB BLD-MCNC: 10.1 G/DL (ref 13.3–17.7)
HGB BLD-MCNC: 10.1 G/DL (ref 13.3–17.7)
HGB BLD-MCNC: 10.2 G/DL (ref 13.3–17.7)
HGB BLD-MCNC: 10.2 G/DL (ref 13.3–17.7)
HGB BLD-MCNC: 10.3 G/DL (ref 13.3–17.7)
HGB BLD-MCNC: 10.4 G/DL (ref 13.3–17.7)
HGB BLD-MCNC: 10.5 G/DL (ref 13.3–17.7)
HGB BLD-MCNC: 10.6 G/DL (ref 13.3–17.7)
HGB BLD-MCNC: 10.6 G/DL (ref 13.3–17.7)
HGB BLD-MCNC: 10.7 G/DL (ref 13.3–17.7)
HGB BLD-MCNC: 11 G/DL (ref 13.3–17.7)
HGB BLD-MCNC: 11 G/DL (ref 13.3–17.7)
HGB BLD-MCNC: 11.2 G/DL (ref 13.3–17.7)
HGB BLD-MCNC: 11.4 G/DL (ref 13.3–17.7)
HGB BLD-MCNC: 11.5 G/DL (ref 13.3–17.7)
HGB BLD-MCNC: 11.8 G/DL (ref 13.3–17.7)
HGB BLD-MCNC: 11.8 G/DL (ref 13.3–17.7)
HGB BLD-MCNC: 11.9 G/DL (ref 13.3–17.7)
HGB BLD-MCNC: 11.9 G/DL (ref 13.3–17.7)
HGB BLD-MCNC: 12 G/DL (ref 13.3–17.7)
HGB BLD-MCNC: 12.4 G/DL (ref 13.3–17.7)
HGB BLD-MCNC: 12.6 G/DL (ref 13.3–17.7)
HGB BLD-MCNC: 13 G/DL (ref 13.3–17.7)
HGB BLD-MCNC: 13 G/DL (ref 13.3–17.7)
HGB BLD-MCNC: 13.1 G/DL (ref 13.3–17.7)
HGB BLD-MCNC: 9.6 G/DL (ref 13.3–17.7)
HGB BLD-MCNC: 9.8 G/DL (ref 13.3–17.7)
HGB BLD-MCNC: 9.8 G/DL (ref 13.3–17.7)
HGB BLD-MCNC: 9.9 G/DL (ref 13.3–17.7)
HGB UR QL STRIP: ABNORMAL
HGB UR QL STRIP: ABNORMAL
HGB UR QL STRIP: NEGATIVE
HIV 1+2 AB+HIV1 P24 AG SERPL QL IA: NONREACTIVE
HMPV RNA SPEC QL NAA+PROBE: NOT DETECTED
HMPV RNA SPEC QL NAA+PROBE: NOT DETECTED
HOLD SPECIMEN: NORMAL
HPIV1 RNA SPEC QL NAA+PROBE: NOT DETECTED
HPIV1 RNA SPEC QL NAA+PROBE: NOT DETECTED
HPIV2 RNA SPEC QL NAA+PROBE: NOT DETECTED
HPIV2 RNA SPEC QL NAA+PROBE: NOT DETECTED
HPIV3 RNA SPEC QL NAA+PROBE: NOT DETECTED
HPIV3 RNA SPEC QL NAA+PROBE: NOT DETECTED
HPIV4 RNA SPEC QL NAA+PROBE: NOT DETECTED
HPIV4 RNA SPEC QL NAA+PROBE: NOT DETECTED
IMM GRANULOCYTES # BLD: 0 10E3/UL
IMM GRANULOCYTES # BLD: 0.1 10E3/UL
IMM GRANULOCYTES NFR BLD: 0 %
IMM GRANULOCYTES NFR BLD: 1 %
IMMUKNOW IMMUNE CELL FUNCTION: 535 NG/ML
INR PPP: 1.26 (ref 0.85–1.15)
INR PPP: 1.29 (ref 0.85–1.15)
INR PPP: 1.3 (ref 0.85–1.15)
INR PPP: 1.36 (ref 0.85–1.15)
INR PPP: 1.4 (ref 0.85–1.15)
INR PPP: 1.49 (ref 0.85–1.15)
INR PPP: 1.56 (ref 0.85–1.15)
INR PPP: 1.6 (ref 0.85–1.15)
INR PPP: 1.69 (ref 0.85–1.15)
INR PPP: 1.73 (ref 0.85–1.15)
INR PPP: 1.74 (ref 0.85–1.15)
INR PPP: 1.76 (ref 0.85–1.15)
INR PPP: 1.77 (ref 0.85–1.15)
INR PPP: 1.79 (ref 0.85–1.15)
INR PPP: 1.81 (ref 0.85–1.15)
INR PPP: 1.9 (ref 0.85–1.15)
INR PPP: 1.94 (ref 0.85–1.15)
INR PPP: 1.94 (ref 0.85–1.15)
INR PPP: 1.95 (ref 0.85–1.15)
INR PPP: 1.97 (ref 0.85–1.15)
INR PPP: 1.97 (ref 0.85–1.15)
INR PPP: 2.1 (ref 0.85–1.15)
INR PPP: 2.13 (ref 0.85–1.15)
INR PPP: 2.15 (ref 0.85–1.15)
INR PPP: 2.27 (ref 0.85–1.15)
INR PPP: 2.31 (ref 0.85–1.15)
INR PPP: 2.36 (ref 0.85–1.15)
INR PPP: 2.56 (ref 0.85–1.15)
INR PPP: 2.68 (ref 0.85–1.15)
INR PPP: 2.69 (ref 0.85–1.15)
INR PPP: 2.72 (ref 0.85–1.15)
INR PPP: 3.25 (ref 0.85–1.15)
INR PPP: 3.29 (ref 0.85–1.15)
INR PPP: 3.35 (ref 0.85–1.15)
INR PPP: 3.36 (ref 0.85–1.15)
INR PPP: 3.54 (ref 0.85–1.15)
INR PPP: 3.59 (ref 0.85–1.15)
INR PPP: 3.97 (ref 0.85–1.15)
INTERPRETATION ECG - MUSE: NORMAL
IRON BINDING CAPACITY (ROCHE): 222 UG/DL (ref 240–430)
IRON BINDING CAPACITY (ROCHE): 255 UG/DL (ref 240–430)
IRON BINDING CAPACITY (ROCHE): 306 UG/DL (ref 240–430)
IRON SATN MFR SERPL: 16 % (ref 15–46)
IRON SATN MFR SERPL: 17 % (ref 15–46)
IRON SATN MFR SERPL: 5 % (ref 15–46)
IRON SERPL-MCNC: 16 UG/DL (ref 61–157)
IRON SERPL-MCNC: 38 UG/DL (ref 61–157)
IRON SERPL-MCNC: 40 UG/DL (ref 61–157)
KAPPA LC FREE SER-MCNC: 7.38 MG/DL (ref 0.33–1.94)
KAPPA LC FREE/LAMBDA FREE SER NEPH: 1.31 {RATIO} (ref 0.26–1.65)
KAPPA LC UR-MCNC: <0.9 MG/DL
KAPPA LC/LAMBDA UR: NORMAL {RATIO} (ref 0.7–6.2)
KETONES UR STRIP-MCNC: ABNORMAL MG/DL
KETONES UR STRIP-MCNC: NEGATIVE MG/DL
LA PPP-IMP: NEGATIVE
LACTATE BLD-SCNC: 0.7 MMOL/L
LACTATE BLD-SCNC: 0.8 MMOL/L
LACTATE BLD-SCNC: 0.8 MMOL/L
LACTATE SERPL-SCNC: 0.9 MMOL/L (ref 0.7–2)
LACTATE SERPL-SCNC: 1 MMOL/L (ref 0.7–2)
LACTATE SERPL-SCNC: 1 MMOL/L (ref 0.7–2)
LACTATE SERPL-SCNC: 1.3 MMOL/L (ref 0.7–2)
LACTATE SERPL-SCNC: 1.4 MMOL/L (ref 0.7–2)
LACTATE SERPL-SCNC: 2 MMOL/L (ref 0.7–2)
LAMBDA LC FREE SERPL-MCNC: 5.64 MG/DL (ref 0.57–2.63)
LAMBDA LC UR-MCNC: <0.7 MG/DL
LDH SERPL L TO P-CCNC: 194 U/L (ref 0–250)
LDLC SERPL CALC-MCNC: 31 MG/DL
LEUKOCYTE ESTERASE UR QL STRIP: ABNORMAL
LEUKOCYTE ESTERASE UR QL STRIP: NEGATIVE
LIPASE SERPL-CCNC: 19 U/L (ref 13–60)
LUPUS INTERPRETATION: ABNORMAL
LVEF ECHO: NORMAL
LYMPHOCYTES # BLD AUTO: 0.7 10E3/UL (ref 0.8–5.3)
LYMPHOCYTES # BLD AUTO: 1 10E3/UL (ref 0.8–5.3)
LYMPHOCYTES # BLD AUTO: 1 10E3/UL (ref 0.8–5.3)
LYMPHOCYTES # BLD AUTO: 1.2 10E3/UL (ref 0.8–5.3)
LYMPHOCYTES # BLD AUTO: 1.3 10E3/UL (ref 0.8–5.3)
LYMPHOCYTES # BLD AUTO: 1.3 10E3/UL (ref 0.8–5.3)
LYMPHOCYTES # BLD AUTO: 1.5 10E3/UL (ref 0.8–5.3)
LYMPHOCYTES # BLD AUTO: 1.6 10E3/UL (ref 0.8–5.3)
LYMPHOCYTES # BLD AUTO: 1.6 10E3/UL (ref 0.8–5.3)
LYMPHOCYTES # BLD AUTO: 1.7 10E3/UL (ref 0.8–5.3)
LYMPHOCYTES # BLD AUTO: 1.8 10E3/UL (ref 0.8–5.3)
LYMPHOCYTES # BLD AUTO: 1.9 10E3/UL (ref 0.8–5.3)
LYMPHOCYTES # BLD AUTO: 1.9 10E3/UL (ref 0.8–5.3)
LYMPHOCYTES NFR BLD AUTO: 10 %
LYMPHOCYTES NFR BLD AUTO: 19 %
LYMPHOCYTES NFR BLD AUTO: 20 %
LYMPHOCYTES NFR BLD AUTO: 20 %
LYMPHOCYTES NFR BLD AUTO: 21 %
LYMPHOCYTES NFR BLD AUTO: 22 %
LYMPHOCYTES NFR BLD AUTO: 23 %
LYMPHOCYTES NFR BLD AUTO: 25 %
LYMPHOCYTES NFR BLD AUTO: 25 %
LYMPHOCYTES NFR BLD AUTO: 28 %
LYMPHOCYTES NFR BLD AUTO: 29 %
LYMPHOCYTES NFR BLD AUTO: 30 %
LYMPHOCYTES NFR BLD AUTO: 30 %
LYMPHOCYTES NFR BLD AUTO: 31 %
LYMPHOCYTES NFR BLD AUTO: 34 %
M PNEUMO DNA SPEC QL NAA+PROBE: NOT DETECTED
M PNEUMO DNA SPEC QL NAA+PROBE: NOT DETECTED
M PROTEIN SERPL ELPH-MCNC: 0 G/DL
M TB IFN-G BLD-IMP: NEGATIVE
M TB IFN-G BLD-IMP: NEGATIVE
M TB IFN-G CD4+ BCKGRND COR BLD-ACNC: 3.77 IU/ML
M TB IFN-G CD4+ BCKGRND COR BLD-ACNC: 9.94 IU/ML
MAGNESIUM SERPL-MCNC: 1.8 MG/DL (ref 1.7–2.3)
MAGNESIUM SERPL-MCNC: 1.9 MG/DL (ref 1.7–2.3)
MAGNESIUM SERPL-MCNC: 2 MG/DL (ref 1.7–2.3)
MAGNESIUM SERPL-MCNC: 2.1 MG/DL (ref 1.7–2.3)
MAGNESIUM SERPL-MCNC: 2.1 MG/DL (ref 1.7–2.3)
MAGNESIUM SERPL-MCNC: 2.2 MG/DL (ref 1.7–2.3)
MAGNESIUM SERPL-MCNC: 2.3 MG/DL (ref 1.7–2.3)
MAGNESIUM SERPL-MCNC: 2.4 MG/DL (ref 1.7–2.3)
MAGNESIUM SERPL-MCNC: 2.5 MG/DL (ref 1.7–2.3)
MAGNESIUM SERPL-MCNC: 2.7 MG/DL (ref 1.7–2.3)
MCH RBC QN AUTO: 28.9 PG (ref 26.5–33)
MCH RBC QN AUTO: 29.2 PG (ref 26.5–33)
MCH RBC QN AUTO: 29.3 PG (ref 26.5–33)
MCH RBC QN AUTO: 29.4 PG (ref 26.5–33)
MCH RBC QN AUTO: 29.5 PG (ref 26.5–33)
MCH RBC QN AUTO: 29.5 PG (ref 26.5–33)
MCH RBC QN AUTO: 29.6 PG (ref 26.5–33)
MCH RBC QN AUTO: 29.6 PG (ref 26.5–33)
MCH RBC QN AUTO: 29.8 PG (ref 26.5–33)
MCH RBC QN AUTO: 29.9 PG (ref 26.5–33)
MCH RBC QN AUTO: 30.1 PG (ref 26.5–33)
MCH RBC QN AUTO: 30.1 PG (ref 26.5–33)
MCH RBC QN AUTO: 30.2 PG (ref 26.5–33)
MCH RBC QN AUTO: 30.7 PG (ref 26.5–33)
MCH RBC QN AUTO: 31 PG (ref 26.5–33)
MCH RBC QN AUTO: 31.1 PG (ref 26.5–33)
MCH RBC QN AUTO: 31.3 PG (ref 26.5–33)
MCH RBC QN AUTO: 31.4 PG (ref 26.5–33)
MCH RBC QN AUTO: 31.4 PG (ref 26.5–33)
MCH RBC QN AUTO: 31.5 PG (ref 26.5–33)
MCH RBC QN AUTO: 31.7 PG (ref 26.5–33)
MCHC RBC AUTO-ENTMCNC: 29.6 G/DL (ref 31.5–36.5)
MCHC RBC AUTO-ENTMCNC: 30 G/DL (ref 31.5–36.5)
MCHC RBC AUTO-ENTMCNC: 30.5 G/DL (ref 31.5–36.5)
MCHC RBC AUTO-ENTMCNC: 30.6 G/DL (ref 31.5–36.5)
MCHC RBC AUTO-ENTMCNC: 30.6 G/DL (ref 31.5–36.5)
MCHC RBC AUTO-ENTMCNC: 30.7 G/DL (ref 31.5–36.5)
MCHC RBC AUTO-ENTMCNC: 30.8 G/DL (ref 31.5–36.5)
MCHC RBC AUTO-ENTMCNC: 30.9 G/DL (ref 31.5–36.5)
MCHC RBC AUTO-ENTMCNC: 30.9 G/DL (ref 31.5–36.5)
MCHC RBC AUTO-ENTMCNC: 31.1 G/DL (ref 31.5–36.5)
MCHC RBC AUTO-ENTMCNC: 31.1 G/DL (ref 31.5–36.5)
MCHC RBC AUTO-ENTMCNC: 31.3 G/DL (ref 31.5–36.5)
MCHC RBC AUTO-ENTMCNC: 31.4 G/DL (ref 31.5–36.5)
MCHC RBC AUTO-ENTMCNC: 31.4 G/DL (ref 31.5–36.5)
MCHC RBC AUTO-ENTMCNC: 31.5 G/DL (ref 31.5–36.5)
MCHC RBC AUTO-ENTMCNC: 31.5 G/DL (ref 31.5–36.5)
MCHC RBC AUTO-ENTMCNC: 31.6 G/DL (ref 31.5–36.5)
MCHC RBC AUTO-ENTMCNC: 31.6 G/DL (ref 31.5–36.5)
MCHC RBC AUTO-ENTMCNC: 31.7 G/DL (ref 31.5–36.5)
MCHC RBC AUTO-ENTMCNC: 31.8 G/DL (ref 31.5–36.5)
MCHC RBC AUTO-ENTMCNC: 31.9 G/DL (ref 31.5–36.5)
MCHC RBC AUTO-ENTMCNC: 32 G/DL (ref 31.5–36.5)
MCHC RBC AUTO-ENTMCNC: 32 G/DL (ref 31.5–36.5)
MCHC RBC AUTO-ENTMCNC: 32.2 G/DL (ref 31.5–36.5)
MCHC RBC AUTO-ENTMCNC: 32.2 G/DL (ref 31.5–36.5)
MCHC RBC AUTO-ENTMCNC: 32.3 G/DL (ref 31.5–36.5)
MCHC RBC AUTO-ENTMCNC: 32.4 G/DL (ref 31.5–36.5)
MCHC RBC AUTO-ENTMCNC: 32.7 G/DL (ref 31.5–36.5)
MCV RBC AUTO: 100 FL (ref 78–100)
MCV RBC AUTO: 100 FL (ref 78–100)
MCV RBC AUTO: 103 FL (ref 78–100)
MCV RBC AUTO: 105 FL (ref 78–100)
MCV RBC AUTO: 91 FL (ref 78–100)
MCV RBC AUTO: 92 FL (ref 78–100)
MCV RBC AUTO: 93 FL (ref 78–100)
MCV RBC AUTO: 94 FL (ref 78–100)
MCV RBC AUTO: 95 FL (ref 78–100)
MCV RBC AUTO: 96 FL (ref 78–100)
MCV RBC AUTO: 97 FL (ref 78–100)
MCV RBC AUTO: 97 FL (ref 78–100)
MCV RBC AUTO: 98 FL (ref 78–100)
MCV RBC AUTO: 98 FL (ref 78–100)
MCV RBC AUTO: 99 FL (ref 78–100)
MICROALBUMIN UR-MCNC: 464 MG/L
MICROALBUMIN/CREAT UR: 336.23 MG/G CR (ref 0–17)
MITOGEN IGNF BCKGRD COR BLD-ACNC: 0 IU/ML
MITOGEN IGNF BCKGRD COR BLD-ACNC: 0.01 IU/ML
MONOCYTES # BLD AUTO: 0.1 10E3/UL (ref 0–1.3)
MONOCYTES # BLD AUTO: 0.4 10E3/UL (ref 0–1.3)
MONOCYTES # BLD AUTO: 0.5 10E3/UL (ref 0–1.3)
MONOCYTES # BLD AUTO: 0.5 10E3/UL (ref 0–1.3)
MONOCYTES # BLD AUTO: 0.6 10E3/UL (ref 0–1.3)
MONOCYTES # BLD AUTO: 0.7 10E3/UL (ref 0–1.3)
MONOCYTES # BLD AUTO: 0.8 10E3/UL (ref 0–1.3)
MONOCYTES # BLD AUTO: 0.9 10E3/UL (ref 0–1.3)
MONOCYTES NFR BLD AUTO: 10 %
MONOCYTES NFR BLD AUTO: 11 %
MONOCYTES NFR BLD AUTO: 12 %
MONOCYTES NFR BLD AUTO: 13 %
MONOCYTES NFR BLD AUTO: 13 %
MONOCYTES NFR BLD AUTO: 14 %
MONOCYTES NFR BLD AUTO: 15 %
MONOCYTES NFR BLD AUTO: 16 %
MONOCYTES NFR BLD AUTO: 16 %
MONOCYTES NFR BLD AUTO: 4 %
MONOCYTES NFR BLD AUTO: 6 %
MONOCYTES NFR BLD AUTO: 8 %
MONOCYTES NFR BLD AUTO: 8 %
MONOCYTES NFR BLD AUTO: 9 %
MRSA DNA SPEC QL NAA+PROBE: NEGATIVE
MUCOUS THREADS #/AREA URNS LPF: PRESENT /LPF
MUCOUS THREADS #/AREA URNS LPF: PRESENT /LPF
NEUTROPHILS # BLD AUTO: 2.2 10E3/UL (ref 1.6–8.3)
NEUTROPHILS # BLD AUTO: 2.3 10E3/UL (ref 1.6–8.3)
NEUTROPHILS # BLD AUTO: 2.3 10E3/UL (ref 1.6–8.3)
NEUTROPHILS # BLD AUTO: 2.4 10E3/UL (ref 1.6–8.3)
NEUTROPHILS # BLD AUTO: 2.4 10E3/UL (ref 1.6–8.3)
NEUTROPHILS # BLD AUTO: 2.5 10E3/UL (ref 1.6–8.3)
NEUTROPHILS # BLD AUTO: 2.5 10E3/UL (ref 1.6–8.3)
NEUTROPHILS # BLD AUTO: 2.6 10E3/UL (ref 1.6–8.3)
NEUTROPHILS # BLD AUTO: 2.7 10E3/UL (ref 1.6–8.3)
NEUTROPHILS # BLD AUTO: 2.7 10E3/UL (ref 1.6–8.3)
NEUTROPHILS # BLD AUTO: 2.8 10E3/UL (ref 1.6–8.3)
NEUTROPHILS # BLD AUTO: 2.9 10E3/UL (ref 1.6–8.3)
NEUTROPHILS # BLD AUTO: 3 10E3/UL (ref 1.6–8.3)
NEUTROPHILS # BLD AUTO: 3.1 10E3/UL (ref 1.6–8.3)
NEUTROPHILS # BLD AUTO: 3.7 10E3/UL (ref 1.6–8.3)
NEUTROPHILS # BLD AUTO: 3.7 10E3/UL (ref 1.6–8.3)
NEUTROPHILS # BLD AUTO: 4.8 10E3/UL (ref 1.6–8.3)
NEUTROPHILS # BLD AUTO: 5.3 10E3/UL (ref 1.6–8.3)
NEUTROPHILS # BLD AUTO: 5.7 10E3/UL (ref 1.6–8.3)
NEUTROPHILS # BLD AUTO: 7.9 10E3/UL (ref 1.6–8.3)
NEUTROPHILS NFR BLD AUTO: 41 %
NEUTROPHILS NFR BLD AUTO: 41 %
NEUTROPHILS NFR BLD AUTO: 42 %
NEUTROPHILS NFR BLD AUTO: 42 %
NEUTROPHILS NFR BLD AUTO: 44 %
NEUTROPHILS NFR BLD AUTO: 45 %
NEUTROPHILS NFR BLD AUTO: 45 %
NEUTROPHILS NFR BLD AUTO: 46 %
NEUTROPHILS NFR BLD AUTO: 46 %
NEUTROPHILS NFR BLD AUTO: 48 %
NEUTROPHILS NFR BLD AUTO: 48 %
NEUTROPHILS NFR BLD AUTO: 49 %
NEUTROPHILS NFR BLD AUTO: 49 %
NEUTROPHILS NFR BLD AUTO: 50 %
NEUTROPHILS NFR BLD AUTO: 51 %
NEUTROPHILS NFR BLD AUTO: 55 %
NEUTROPHILS NFR BLD AUTO: 61 %
NEUTROPHILS NFR BLD AUTO: 65 %
NEUTROPHILS NFR BLD AUTO: 66 %
NEUTROPHILS NFR BLD AUTO: 72 %
NEUTROPHILS NFR BLD AUTO: 75 %
NEUTROPHILS NFR BLD AUTO: 81 %
NITRATE UR QL: NEGATIVE
NONHDLC SERPL-MCNC: 47 MG/DL
NRBC # BLD AUTO: 0 10E3/UL
NRBC BLD AUTO-RTO: 0 /100
NT-PROBNP SERPL-MCNC: ABNORMAL PG/ML (ref 0–900)
O2/TOTAL GAS SETTING VFR VENT: 21 %
O2/TOTAL GAS SETTING VFR VENT: 30 %
O2/TOTAL GAS SETTING VFR VENT: 40 %
O2/TOTAL GAS SETTING VFR VENT: 50 %
O2/TOTAL GAS SETTING VFR VENT: 60 %
O2/TOTAL GAS SETTING VFR VENT: 71 %
O2/TOTAL GAS SETTING VFR VENT: 80 %
ORGAN: NORMAL
OXALATE SERPL-SCNC: 14 UMOL/L
OXALATE SERPL-SCNC: 19.8 UMOL/L
OXALATE SERPL-SCNC: 3.9 UMOL/L
OXALATE SERPL-SCNC: 8.6 UMOL/L
P AXIS - MUSE: 55 DEGREES
P AXIS - MUSE: 65 DEGREES
P AXIS - MUSE: 67 DEGREES
P AXIS - MUSE: 68 DEGREES
P AXIS - MUSE: 69 DEGREES
P AXIS - MUSE: 73 DEGREES
P AXIS - MUSE: NORMAL DEGREES
PATH REPORT.ADDENDUM SPEC: NORMAL
PATH REPORT.COMMENTS IMP SPEC: NORMAL
PATH REPORT.FINAL DX SPEC: NORMAL
PATH REPORT.FINAL DX SPEC: NORMAL
PATH REPORT.GROSS SPEC: NORMAL
PATH REPORT.GROSS SPEC: NORMAL
PATH REPORT.MICROSCOPIC SPEC OTHER STN: NORMAL
PATH REPORT.MICROSCOPIC SPEC OTHER STN: NORMAL
PATH REPORT.RELEVANT HX SPEC: NORMAL
PATH REPORT.RELEVANT HX SPEC: NORMAL
PCO2 BLD: 40 MM HG (ref 35–45)
PCO2 BLD: 42 MM HG (ref 35–45)
PCO2 BLD: 44 MM HG (ref 35–45)
PCO2 BLD: 55 MM HG (ref 35–45)
PCO2 BLDA: 60 MM HG (ref 35–45)
PCO2 BLDA: 71 MM HG (ref 35–45)
PCO2 BLDA: 81 MM HG (ref 35–45)
PH BLD: 7.16 [PH] (ref 7.35–7.45)
PH BLD: 7.23 [PH] (ref 7.35–7.45)
PH BLD: 7.24 [PH] (ref 7.35–7.45)
PH BLD: 7.27 [PH] (ref 7.35–7.45)
PH BLDA: 7.03 [PH] (ref 7.35–7.45)
PH BLDA: 7.08 [PH] (ref 7.35–7.45)
PH BLDA: 7.12 [PH] (ref 7.35–7.45)
PH UR STRIP: 5 [PH] (ref 5–7)
PH UR STRIP: 5.5 [PH] (ref 5–7)
PH UR STRIP: 6.5 [PH] (ref 5–7)
PH UR STRIP: 7 [PH] (ref 5–7)
PHOSPHATE SERPL-MCNC: 3.8 MG/DL (ref 2.5–4.5)
PHOSPHATE SERPL-MCNC: 3.8 MG/DL (ref 2.5–4.5)
PHOSPHATE SERPL-MCNC: 4.1 MG/DL (ref 2.5–4.5)
PHOSPHATE SERPL-MCNC: 4.2 MG/DL (ref 2.5–4.5)
PHOSPHATE SERPL-MCNC: 4.3 MG/DL (ref 2.5–4.5)
PHOSPHATE SERPL-MCNC: 4.5 MG/DL (ref 2.5–4.5)
PHOSPHATE SERPL-MCNC: 4.6 MG/DL (ref 2.5–4.5)
PHOSPHATE SERPL-MCNC: 5.3 MG/DL (ref 2.5–4.5)
PHOSPHATE SERPL-MCNC: 5.8 MG/DL (ref 2.5–4.5)
PHOSPHATE SERPL-MCNC: 6.1 MG/DL (ref 2.5–4.5)
PHOSPHATE SERPL-MCNC: 6.1 MG/DL (ref 2.5–4.5)
PHOSPHATE SERPL-MCNC: 6.2 MG/DL (ref 2.5–4.5)
PHOSPHATE SERPL-MCNC: 6.2 MG/DL (ref 2.5–4.5)
PHOSPHATE SERPL-MCNC: 6.4 MG/DL (ref 2.5–4.5)
PHOSPHATE SERPL-MCNC: 6.5 MG/DL (ref 2.5–4.5)
PHOSPHATE SERPL-MCNC: 6.6 MG/DL (ref 2.5–4.5)
PHOSPHATE SERPL-MCNC: 6.6 MG/DL (ref 2.5–4.5)
PHOSPHATE SERPL-MCNC: 8.2 MG/DL (ref 2.5–4.5)
PHOTO IMAGE: NORMAL
PHOTO IMAGE: NORMAL
PLATELET # BLD AUTO: 155 10E3/UL (ref 150–450)
PLATELET # BLD AUTO: 156 10E3/UL (ref 150–450)
PLATELET # BLD AUTO: 157 10E3/UL (ref 150–450)
PLATELET # BLD AUTO: 158 10E3/UL (ref 150–450)
PLATELET # BLD AUTO: 161 10E3/UL (ref 150–450)
PLATELET # BLD AUTO: 164 10E3/UL (ref 150–450)
PLATELET # BLD AUTO: 164 10E3/UL (ref 150–450)
PLATELET # BLD AUTO: 166 10E3/UL (ref 150–450)
PLATELET # BLD AUTO: 176 10E3/UL (ref 150–450)
PLATELET # BLD AUTO: 178 10E3/UL (ref 150–450)
PLATELET # BLD AUTO: 180 10E3/UL (ref 150–450)
PLATELET # BLD AUTO: 182 10E3/UL (ref 150–450)
PLATELET # BLD AUTO: 187 10E3/UL (ref 150–450)
PLATELET # BLD AUTO: 188 10E3/UL (ref 150–450)
PLATELET # BLD AUTO: 188 10E3/UL (ref 150–450)
PLATELET # BLD AUTO: 189 10E3/UL (ref 150–450)
PLATELET # BLD AUTO: 190 10E3/UL (ref 150–450)
PLATELET # BLD AUTO: 192 10E3/UL (ref 150–450)
PLATELET # BLD AUTO: 203 10E3/UL (ref 150–450)
PLATELET # BLD AUTO: 206 10E3/UL (ref 150–450)
PLATELET # BLD AUTO: 215 10E3/UL (ref 150–450)
PLATELET # BLD AUTO: 223 10E3/UL (ref 150–450)
PLATELET # BLD AUTO: 223 10E3/UL (ref 150–450)
PLATELET # BLD AUTO: 224 10E3/UL (ref 150–450)
PLATELET # BLD AUTO: 233 10E3/UL (ref 150–450)
PLATELET # BLD AUTO: 238 10E3/UL (ref 150–450)
PLATELET # BLD AUTO: 240 10E3/UL (ref 150–450)
PLATELET # BLD AUTO: 251 10E3/UL (ref 150–450)
PLATELET # BLD AUTO: 270 10E3/UL (ref 150–450)
PLATELET # BLD AUTO: 393 10E3/UL (ref 150–450)
PLATELET NEUTRALIZATION: -4 SECONDS
PO2 BLD: 109 MM HG (ref 80–105)
PO2 BLD: 114 MM HG (ref 80–105)
PO2 BLD: 83 MM HG (ref 80–105)
PO2 BLD: 86 MM HG (ref 80–105)
PO2 BLDA: 103 MM HG (ref 80–105)
PO2 BLDA: 182 MM HG (ref 80–105)
PO2 BLDA: 89 MM HG (ref 80–105)
POTASSIUM BLD-SCNC: 5.4 MMOL/L (ref 3.5–5)
POTASSIUM BLD-SCNC: 5.5 MMOL/L (ref 3.5–5)
POTASSIUM BLD-SCNC: 5.6 MMOL/L (ref 3.5–5)
POTASSIUM SERPL-SCNC: 3 MMOL/L (ref 3.4–5.3)
POTASSIUM SERPL-SCNC: 3.2 MMOL/L (ref 3.4–5.3)
POTASSIUM SERPL-SCNC: 3.3 MMOL/L (ref 3.4–5.3)
POTASSIUM SERPL-SCNC: 3.4 MMOL/L (ref 3.4–5.3)
POTASSIUM SERPL-SCNC: 3.4 MMOL/L (ref 3.4–5.3)
POTASSIUM SERPL-SCNC: 3.5 MMOL/L (ref 3.4–5.3)
POTASSIUM SERPL-SCNC: 3.5 MMOL/L (ref 3.4–5.3)
POTASSIUM SERPL-SCNC: 3.9 MMOL/L (ref 3.4–5.3)
POTASSIUM SERPL-SCNC: 4 MMOL/L (ref 3.4–5.3)
POTASSIUM SERPL-SCNC: 4.1 MMOL/L (ref 3.4–5.3)
POTASSIUM SERPL-SCNC: 4.2 MMOL/L (ref 3.4–5.3)
POTASSIUM SERPL-SCNC: 4.3 MMOL/L (ref 3.4–5.3)
POTASSIUM SERPL-SCNC: 4.4 MMOL/L (ref 3.4–5.3)
POTASSIUM SERPL-SCNC: 4.5 MMOL/L (ref 3.4–5.3)
POTASSIUM SERPL-SCNC: 4.6 MMOL/L (ref 3.4–5.3)
POTASSIUM SERPL-SCNC: 4.7 MMOL/L (ref 3.4–5.3)
POTASSIUM SERPL-SCNC: 4.8 MMOL/L (ref 3.4–5.3)
POTASSIUM SERPL-SCNC: 4.8 MMOL/L (ref 3.4–5.3)
POTASSIUM SERPL-SCNC: 4.9 MMOL/L (ref 3.4–5.3)
POTASSIUM SERPL-SCNC: 5 MMOL/L (ref 3.4–5.3)
POTASSIUM SERPL-SCNC: 5.1 MMOL/L (ref 3.4–5.3)
POTASSIUM SERPL-SCNC: 5.2 MMOL/L (ref 3.4–5.3)
POTASSIUM SERPL-SCNC: 5.3 MMOL/L (ref 3.4–5.3)
POTASSIUM SERPL-SCNC: 5.5 MMOL/L (ref 3.4–5.3)
POTASSIUM SERPL-SCNC: 5.6 MMOL/L (ref 3.4–5.3)
PR INTERVAL - MUSE: 142 MS
PR INTERVAL - MUSE: 164 MS
PR INTERVAL - MUSE: 188 MS
PR INTERVAL - MUSE: 196 MS
PR INTERVAL - MUSE: 200 MS
PR INTERVAL - MUSE: 202 MS
PR INTERVAL - MUSE: NORMAL MS
PROT ELPH PNL UR ELPH: NORMAL
PROT PATTERN SERPL ELPH-IMP: NORMAL
PROT PATTERN SERPL IFE-IMP: NORMAL
PROT PATTERN UR ELPH-IMP: NORMAL
PROT SERPL-MCNC: 5.6 G/DL (ref 6.4–8.3)
PROT SERPL-MCNC: 5.9 G/DL (ref 6.4–8.3)
PROT SERPL-MCNC: 6 G/DL (ref 6.4–8.3)
PROT SERPL-MCNC: 6 G/DL (ref 6.4–8.3)
PROT SERPL-MCNC: 6.1 G/DL (ref 6.4–8.3)
PROT SERPL-MCNC: 6.2 G/DL (ref 6.4–8.3)
PROT SERPL-MCNC: 6.4 G/DL (ref 6.4–8.3)
PROT SERPL-MCNC: 6.5 G/DL (ref 6.4–8.3)
PROT SERPL-MCNC: 6.6 G/DL (ref 6.4–8.3)
PROT SERPL-MCNC: 6.7 G/DL (ref 6.4–8.3)
PROT SERPL-MCNC: 6.8 G/DL (ref 6.4–8.3)
PROT SERPL-MCNC: 6.9 G/DL (ref 6.4–8.3)
PROT SERPL-MCNC: 7 G/DL (ref 6.4–8.3)
PROT SERPL-MCNC: 7.1 G/DL (ref 6.4–8.3)
PROT SERPL-MCNC: 7.1 G/DL (ref 6.4–8.3)
PROT SERPL-MCNC: 7.2 G/DL (ref 6.4–8.3)
PROT SERPL-MCNC: 7.3 G/DL (ref 6.4–8.3)
PROT SERPL-MCNC: 7.3 G/DL (ref 6.4–8.3)
PROT/CREAT 24H UR: 0.24 MG/MG CR (ref 0–0.2)
PROT/CREAT 24H UR: 0.26 MG/MG CR (ref 0–0.2)
PROT/CREAT 24H UR: 0.4 MG/MG CR (ref 0–0.2)
PROT/CREAT 24H UR: 0.48 MG/MG CR (ref 0–0.2)
PROT/CREAT 24H UR: 0.89 MG/MG CR (ref 0–0.2)
PSA SERPL DL<=0.01 NG/ML-MCNC: 0.37 NG/ML (ref 0–4.5)
PSA SERPL DL<=0.01 NG/ML-MCNC: 0.51 NG/ML (ref 0–4.5)
PSA SERPL DL<=0.01 NG/ML-MCNC: 5.86 NG/ML (ref 0–4.5)
PTH-INTACT SERPL-MCNC: 649 PG/ML (ref 15–65)
PTT 1:2 MIX: 37 SECONDS (ref 31–45)
PTT RATIO: 1.46
QRS DURATION - MUSE: 100 MS
QRS DURATION - MUSE: 106 MS
QRS DURATION - MUSE: 108 MS
QRS DURATION - MUSE: 110 MS
QRS DURATION - MUSE: 90 MS
QRS DURATION - MUSE: 94 MS
QRS DURATION - MUSE: 98 MS
QT - MUSE: 288 MS
QT - MUSE: 290 MS
QT - MUSE: 346 MS
QT - MUSE: 354 MS
QT - MUSE: 372 MS
QT - MUSE: 384 MS
QT - MUSE: 388 MS
QT - MUSE: 414 MS
QT - MUSE: 464 MS
QTC - MUSE: 400 MS
QTC - MUSE: 431 MS
QTC - MUSE: 437 MS
QTC - MUSE: 468 MS
QTC - MUSE: 469 MS
QTC - MUSE: 474 MS
QTC - MUSE: 568 MS
QTC - MUSE: 585 MS
QTC - MUSE: 631 MS
QUANTIFERON MITOGEN: 10 IU/ML
QUANTIFERON MITOGEN: 3.81 IU/ML
QUANTIFERON NIL TUBE: 0.04 IU/ML
QUANTIFERON NIL TUBE: 0.06 IU/ML
QUANTIFERON TB1 TUBE: 0.04 IU/ML
QUANTIFERON TB1 TUBE: 0.06 IU/ML
QUANTIFERON TB2 TUBE: 0.05
QUANTIFERON TB2 TUBE: 0.06
R AXIS - MUSE: 181 DEGREES
R AXIS - MUSE: 199 DEGREES
R AXIS - MUSE: 205 DEGREES
R AXIS - MUSE: 208 DEGREES
R AXIS - MUSE: 214 DEGREES
R AXIS - MUSE: 220 DEGREES
R AXIS - MUSE: 231 DEGREES
R AXIS - MUSE: 59 DEGREES
R AXIS - MUSE: 66 DEGREES
RADIOLOGIST FLAGS: ABNORMAL
RADIOLOGIST FLAGS: NORMAL
RBC # BLD AUTO: 3.14 10E6/UL (ref 4.4–5.9)
RBC # BLD AUTO: 3.25 10E6/UL (ref 4.4–5.9)
RBC # BLD AUTO: 3.34 10E6/UL (ref 4.4–5.9)
RBC # BLD AUTO: 3.36 10E6/UL (ref 4.4–5.9)
RBC # BLD AUTO: 3.36 10E6/UL (ref 4.4–5.9)
RBC # BLD AUTO: 3.38 10E6/UL (ref 4.4–5.9)
RBC # BLD AUTO: 3.38 10E6/UL (ref 4.4–5.9)
RBC # BLD AUTO: 3.42 10E6/UL (ref 4.4–5.9)
RBC # BLD AUTO: 3.46 10E6/UL (ref 4.4–5.9)
RBC # BLD AUTO: 3.48 10E6/UL (ref 4.4–5.9)
RBC # BLD AUTO: 3.49 10E6/UL (ref 4.4–5.9)
RBC # BLD AUTO: 3.54 10E6/UL (ref 4.4–5.9)
RBC # BLD AUTO: 3.57 10E6/UL (ref 4.4–5.9)
RBC # BLD AUTO: 3.58 10E6/UL (ref 4.4–5.9)
RBC # BLD AUTO: 3.59 10E6/UL (ref 4.4–5.9)
RBC # BLD AUTO: 3.6 10E6/UL (ref 4.4–5.9)
RBC # BLD AUTO: 3.6 10E6/UL (ref 4.4–5.9)
RBC # BLD AUTO: 3.61 10E6/UL (ref 4.4–5.9)
RBC # BLD AUTO: 3.63 10E6/UL (ref 4.4–5.9)
RBC # BLD AUTO: 3.67 10E6/UL (ref 4.4–5.9)
RBC # BLD AUTO: 3.69 10E6/UL (ref 4.4–5.9)
RBC # BLD AUTO: 3.76 10E6/UL (ref 4.4–5.9)
RBC # BLD AUTO: 3.78 10E6/UL (ref 4.4–5.9)
RBC # BLD AUTO: 3.83 10E6/UL (ref 4.4–5.9)
RBC # BLD AUTO: 3.84 10E6/UL (ref 4.4–5.9)
RBC # BLD AUTO: 3.95 10E6/UL (ref 4.4–5.9)
RBC # BLD AUTO: 3.96 10E6/UL (ref 4.4–5.9)
RBC # BLD AUTO: 3.97 10E6/UL (ref 4.4–5.9)
RBC # BLD AUTO: 3.98 10E6/UL (ref 4.4–5.9)
RBC # BLD AUTO: 4.02 10E6/UL (ref 4.4–5.9)
RBC #/AREA URNS AUTO: ABNORMAL /HPF
RBC URINE: 0 /HPF
RBC URINE: 0 /HPF
RBC URINE: 1 /HPF
RBC URINE: <1 /HPF
RBC URINE: <1 /HPF
RETINOPATHY: NEGATIVE
RETINOPATHY: NORMAL
RSV RNA SPEC NAA+PROBE: NEGATIVE
RSV RNA SPEC NAA+PROBE: NEGATIVE
RSV RNA SPEC QL NAA+PROBE: NOT DETECTED
RV+EV RNA SPEC QL NAA+PROBE: NOT DETECTED
RV+EV RNA SPEC QL NAA+PROBE: NOT DETECTED
SA 1  COMMENTS: NORMAL
SA 1 CELL: NORMAL
SA 1 TEST METHOD: NORMAL
SA 2 CELL: NORMAL
SA 2 COMMENTS: NORMAL
SA 2 TEST METHOD: NORMAL
SA TARGET DNA: NEGATIVE
SA1 HI RISK ABY: NORMAL
SA1 MOD RISK ABY: NORMAL
SA2 HI RISK ABY: NORMAL
SA2 MOD RISK ABY: NORMAL
SARS-COV-2 RNA RESP QL NAA+PROBE: NEGATIVE
SODIUM BLD-SCNC: 136 MMOL/L (ref 135–145)
SODIUM BLD-SCNC: 138 MMOL/L (ref 135–145)
SODIUM BLD-SCNC: 139 MMOL/L (ref 135–145)
SODIUM SERPL-SCNC: 131 MMOL/L (ref 135–145)
SODIUM SERPL-SCNC: 132 MMOL/L (ref 135–145)
SODIUM SERPL-SCNC: 133 MMOL/L (ref 135–145)
SODIUM SERPL-SCNC: 134 MMOL/L (ref 135–145)
SODIUM SERPL-SCNC: 135 MMOL/L (ref 135–145)
SODIUM SERPL-SCNC: 136 MMOL/L (ref 135–145)
SODIUM SERPL-SCNC: 137 MMOL/L (ref 135–145)
SODIUM SERPL-SCNC: 138 MMOL/L (ref 135–145)
SODIUM SERPL-SCNC: 139 MMOL/L (ref 135–145)
SODIUM SERPL-SCNC: 140 MMOL/L (ref 135–145)
SODIUM SERPL-SCNC: 143 MMOL/L (ref 135–145)
SODIUM SERPL-SCNC: 146 MMOL/L (ref 135–145)
SODIUM UR-SCNC: 25 MMOL/L
SP GR UR STRIP: 1.01 (ref 1–1.03)
SP GR UR STRIP: 1.02 (ref 1–1.03)
SP GR UR STRIP: 1.02 (ref 1–1.03)
SPECIMEN EXPIRATION DATE: NORMAL
SQUAMOUS #/AREA URNS AUTO: ABNORMAL /LPF
SQUAMOUS EPITHELIAL: <1 /HPF
SQUAMOUS EPITHELIAL: <1 /HPF
SYSTOLIC BLOOD PRESSURE - MUSE: NORMAL MMHG
T AXIS - MUSE: 270 DEGREES
T AXIS - MUSE: 48 DEGREES
T AXIS - MUSE: 48 DEGREES
T AXIS - MUSE: 52 DEGREES
T AXIS - MUSE: 55 DEGREES
T AXIS - MUSE: 58 DEGREES
T AXIS - MUSE: 63 DEGREES
T AXIS - MUSE: 70 DEGREES
T AXIS - MUSE: 75 DEGREES
T PALLIDUM AB SER QL: NONREACTIVE
TACROLIMUS BLD-MCNC: 12.9 UG/L (ref 5–15)
TACROLIMUS BLD-MCNC: 2 UG/L (ref 5–15)
TACROLIMUS BLD-MCNC: 3.9 UG/L (ref 5–15)
TACROLIMUS BLD-MCNC: 4.4 UG/L (ref 5–15)
TACROLIMUS BLD-MCNC: 4.8 UG/L (ref 5–15)
TACROLIMUS BLD-MCNC: 5 UG/L (ref 5–15)
TACROLIMUS BLD-MCNC: 5.1 UG/L (ref 5–15)
TACROLIMUS BLD-MCNC: 5.1 UG/L (ref 5–15)
TACROLIMUS BLD-MCNC: 5.3 UG/L (ref 5–15)
TACROLIMUS BLD-MCNC: 5.4 UG/L (ref 5–15)
TACROLIMUS BLD-MCNC: 5.5 UG/L (ref 5–15)
TACROLIMUS BLD-MCNC: 5.6 UG/L (ref 5–15)
TACROLIMUS BLD-MCNC: 6.5 UG/L (ref 5–15)
TACROLIMUS BLD-MCNC: 6.6 UG/L (ref 5–15)
TACROLIMUS BLD-MCNC: 6.8 UG/L (ref 5–15)
TACROLIMUS BLD-MCNC: 7.5 UG/L (ref 5–15)
THROMBIN TIME: 16.6 SECONDS (ref 13–19)
TME LAST DOSE: ABNORMAL H
TME LAST DOSE: NORMAL H
TOTAL PROTEIN SERUM FOR ELP: 6 G/DL (ref 6.4–8.3)
TRIGL SERPL-MCNC: 79 MG/DL
TROPONIN T SERPL HS-MCNC: 112 NG/L
TROPONIN T SERPL HS-MCNC: 116 NG/L
TROPONIN T SERPL HS-MCNC: 152 NG/L
TROPONIN T SERPL HS-MCNC: 154 NG/L
TROPONIN T SERPL HS-MCNC: 156 NG/L
TROPONIN T SERPL HS-MCNC: 175 NG/L
TROPONIN T SERPL HS-MCNC: 178 NG/L
TSH SERPL DL<=0.005 MIU/L-ACNC: 2.94 UIU/ML (ref 0.3–4.2)
TSH SERPL DL<=0.005 MIU/L-ACNC: 3.65 UIU/ML (ref 0.3–4.2)
TSH SERPL DL<=0.005 MIU/L-ACNC: 4.13 UIU/ML (ref 0.3–4.2)
UFH PPP CHRO-ACNC: 0.21 IU/ML
UFH PPP CHRO-ACNC: 0.38 IU/ML
UFH PPP CHRO-ACNC: 0.39 IU/ML
UFH PPP CHRO-ACNC: 0.4 IU/ML
UFH PPP CHRO-ACNC: 0.42 IU/ML
UFH PPP CHRO-ACNC: 0.45 IU/ML
UFH PPP CHRO-ACNC: 0.48 IU/ML
UFH PPP CHRO-ACNC: 0.56 IU/ML
UFH PPP CHRO-ACNC: 0.71 IU/ML
UFH PPP CHRO-ACNC: 0.73 IU/ML
UFH PPP CHRO-ACNC: <0.1 IU/ML
UFH PPP CHRO-ACNC: <0.1 IU/ML
UNACCEPTABLE ANTIGENS: NORMAL
UNOS CPRA: 35
URATE SERPL-MCNC: 4.5 MG/DL (ref 3.4–7)
URATE SERPL-MCNC: 5.3 MG/DL (ref 3.4–7)
UROBILINOGEN UR STRIP-ACNC: 0.2 E.U./DL
UROBILINOGEN UR STRIP-MCNC: NORMAL MG/DL
UUN UR-MCNC: 467 MG/DL (ref 801–1666)
VENTRICULAR RATE- MUSE: 110 BPM
VENTRICULAR RATE- MUSE: 111 BPM
VENTRICULAR RATE- MUSE: 116 BPM
VENTRICULAR RATE- MUSE: 120 BPM
VENTRICULAR RATE- MUSE: 129 BPM
VENTRICULAR RATE- MUSE: 133 BPM
VENTRICULAR RATE- MUSE: 92 BPM
VENTRICULAR RATE- MUSE: 92 BPM
VENTRICULAR RATE- MUSE: 96 BPM
VIT D+METAB SERPL-MCNC: 38 NG/ML (ref 20–50)
VZV IGG SER QL IA: 564.2 INDEX
VZV IGG SER QL IA: POSITIVE
WBC # BLD AUTO: 10.3 10E3/UL (ref 4–11)
WBC # BLD AUTO: 11.8 10E3/UL (ref 4–11)
WBC # BLD AUTO: 14.2 10E3/UL (ref 4–11)
WBC # BLD AUTO: 17.7 10E3/UL (ref 4–11)
WBC # BLD AUTO: 3.9 10E3/UL (ref 4–11)
WBC # BLD AUTO: 4.1 10E3/UL (ref 4–11)
WBC # BLD AUTO: 4.3 10E3/UL (ref 4–11)
WBC # BLD AUTO: 4.5 10E3/UL (ref 4–11)
WBC # BLD AUTO: 4.6 10E3/UL (ref 4–11)
WBC # BLD AUTO: 5.3 10E3/UL (ref 4–11)
WBC # BLD AUTO: 5.4 10E3/UL (ref 4–11)
WBC # BLD AUTO: 5.5 10E3/UL (ref 4–11)
WBC # BLD AUTO: 5.6 10E3/UL (ref 4–11)
WBC # BLD AUTO: 5.7 10E3/UL (ref 4–11)
WBC # BLD AUTO: 5.9 10E3/UL (ref 4–11)
WBC # BLD AUTO: 6 10E3/UL (ref 4–11)
WBC # BLD AUTO: 6 10E3/UL (ref 4–11)
WBC # BLD AUTO: 6.2 10E3/UL (ref 4–11)
WBC # BLD AUTO: 6.3 10E3/UL (ref 4–11)
WBC # BLD AUTO: 6.6 10E3/UL (ref 4–11)
WBC # BLD AUTO: 7 10E3/UL (ref 4–11)
WBC # BLD AUTO: 7.4 10E3/UL (ref 4–11)
WBC # BLD AUTO: 7.5 10E3/UL (ref 4–11)
WBC # BLD AUTO: 7.9 10E3/UL (ref 4–11)
WBC # BLD AUTO: 8.8 10E3/UL (ref 4–11)
WBC # BLD AUTO: 8.9 10E3/UL (ref 4–11)
WBC # BLD AUTO: 9.7 10E3/UL (ref 4–11)
WBC #/AREA URNS AUTO: ABNORMAL /HPF
WBC URINE: 1 /HPF
WBC URINE: 1 /HPF
WBC URINE: <1 /HPF

## 2024-01-01 PROCEDURE — 36415 COLL VENOUS BLD VENIPUNCTURE: CPT | Performed by: PHYSICIAN ASSISTANT

## 2024-01-01 PROCEDURE — 250N000012 HC RX MED GY IP 250 OP 636 PS 637: Performed by: PHYSICIAN ASSISTANT

## 2024-01-01 PROCEDURE — 250N000012 HC RX MED GY IP 250 OP 636 PS 637: Performed by: STUDENT IN AN ORGANIZED HEALTH CARE EDUCATION/TRAINING PROGRAM

## 2024-01-01 PROCEDURE — 93505 ENDOMYOCARDIAL BIOPSY: CPT | Performed by: INTERNAL MEDICINE

## 2024-01-01 PROCEDURE — 36415 COLL VENOUS BLD VENIPUNCTURE: CPT

## 2024-01-01 PROCEDURE — 82248 BILIRUBIN DIRECT: CPT | Performed by: STUDENT IN AN ORGANIZED HEALTH CARE EDUCATION/TRAINING PROGRAM

## 2024-01-01 PROCEDURE — 86706 HEP B SURFACE ANTIBODY: CPT | Performed by: NURSE PRACTITIONER

## 2024-01-01 PROCEDURE — 250N000013 HC RX MED GY IP 250 OP 250 PS 637: Performed by: INTERNAL MEDICINE

## 2024-01-01 PROCEDURE — 80053 COMPREHEN METABOLIC PANEL: CPT | Performed by: STUDENT IN AN ORGANIZED HEALTH CARE EDUCATION/TRAINING PROGRAM

## 2024-01-01 PROCEDURE — 93010 ELECTROCARDIOGRAM REPORT: CPT | Performed by: INTERNAL MEDICINE

## 2024-01-01 PROCEDURE — 84681 ASSAY OF C-PEPTIDE: CPT | Performed by: NURSE PRACTITIONER

## 2024-01-01 PROCEDURE — 250N000009 HC RX 250

## 2024-01-01 PROCEDURE — 94640 AIRWAY INHALATION TREATMENT: CPT | Mod: 76

## 2024-01-01 PROCEDURE — 36415 COLL VENOUS BLD VENIPUNCTURE: CPT | Performed by: PATHOLOGY

## 2024-01-01 PROCEDURE — 250N000013 HC RX MED GY IP 250 OP 250 PS 637: Performed by: STUDENT IN AN ORGANIZED HEALTH CARE EDUCATION/TRAINING PROGRAM

## 2024-01-01 PROCEDURE — 250N000013 HC RX MED GY IP 250 OP 250 PS 637

## 2024-01-01 PROCEDURE — 87581 M.PNEUMON DNA AMP PROBE: CPT | Performed by: NURSE PRACTITIONER

## 2024-01-01 PROCEDURE — 83550 IRON BINDING TEST: CPT | Performed by: STUDENT IN AN ORGANIZED HEALTH CARE EDUCATION/TRAINING PROGRAM

## 2024-01-01 PROCEDURE — 82570 ASSAY OF URINE CREATININE: CPT | Performed by: PHYSICIAN ASSISTANT

## 2024-01-01 PROCEDURE — 250N000013 HC RX MED GY IP 250 OP 250 PS 637: Performed by: PHYSICIAN ASSISTANT

## 2024-01-01 PROCEDURE — 85027 COMPLETE CBC AUTOMATED: CPT

## 2024-01-01 PROCEDURE — 85613 RUSSELL VIPER VENOM DILUTED: CPT | Performed by: NURSE PRACTITIONER

## 2024-01-01 PROCEDURE — 250N000011 HC RX IP 250 OP 636: Performed by: NURSE PRACTITIONER

## 2024-01-01 PROCEDURE — 87205 SMEAR GRAM STAIN: CPT

## 2024-01-01 PROCEDURE — 87799 DETECT AGENT NOS DNA QUANT: CPT

## 2024-01-01 PROCEDURE — 87637 SARSCOV2&INF A&B&RSV AMP PRB: CPT | Performed by: EMERGENCY MEDICINE

## 2024-01-01 PROCEDURE — 80076 HEPATIC FUNCTION PANEL: CPT | Performed by: STUDENT IN AN ORGANIZED HEALTH CARE EDUCATION/TRAINING PROGRAM

## 2024-01-01 PROCEDURE — 258N000003 HC RX IP 258 OP 636: Performed by: PHYSICIAN ASSISTANT

## 2024-01-01 PROCEDURE — 85730 THROMBOPLASTIN TIME PARTIAL: CPT | Performed by: PATHOLOGY

## 2024-01-01 PROCEDURE — 258N000003 HC RX IP 258 OP 636: Performed by: NURSE PRACTITIONER

## 2024-01-01 PROCEDURE — 360N000076 HC SURGERY LEVEL 3, PER MIN: Performed by: SURGERY

## 2024-01-01 PROCEDURE — 250N000013 HC RX MED GY IP 250 OP 250 PS 637: Performed by: SURGERY

## 2024-01-01 PROCEDURE — 84100 ASSAY OF PHOSPHORUS: CPT | Performed by: STUDENT IN AN ORGANIZED HEALTH CARE EDUCATION/TRAINING PROGRAM

## 2024-01-01 PROCEDURE — 85004 AUTOMATED DIFF WBC COUNT: CPT

## 2024-01-01 PROCEDURE — 36415 COLL VENOUS BLD VENIPUNCTURE: CPT | Performed by: INTERNAL MEDICINE

## 2024-01-01 PROCEDURE — 84460 ALANINE AMINO (ALT) (SGPT): CPT | Performed by: STUDENT IN AN ORGANIZED HEALTH CARE EDUCATION/TRAINING PROGRAM

## 2024-01-01 PROCEDURE — 93005 ELECTROCARDIOGRAM TRACING: CPT

## 2024-01-01 PROCEDURE — 999N000157 HC STATISTIC RCP TIME EA 10 MIN

## 2024-01-01 PROCEDURE — 250N000011 HC RX IP 250 OP 636: Performed by: SURGERY

## 2024-01-01 PROCEDURE — 84153 ASSAY OF PSA TOTAL: CPT

## 2024-01-01 PROCEDURE — 99285 EMERGENCY DEPT VISIT HI MDM: CPT | Mod: 25 | Performed by: FAMILY MEDICINE

## 2024-01-01 PROCEDURE — 99291 CRITICAL CARE FIRST HOUR: CPT | Mod: 24 | Performed by: NURSE PRACTITIONER

## 2024-01-01 PROCEDURE — 85610 PROTHROMBIN TIME: CPT | Performed by: STUDENT IN AN ORGANIZED HEALTH CARE EDUCATION/TRAINING PROGRAM

## 2024-01-01 PROCEDURE — 36415 COLL VENOUS BLD VENIPUNCTURE: CPT | Performed by: STUDENT IN AN ORGANIZED HEALTH CARE EDUCATION/TRAINING PROGRAM

## 2024-01-01 PROCEDURE — 83880 ASSAY OF NATRIURETIC PEPTIDE: CPT | Performed by: FAMILY MEDICINE

## 2024-01-01 PROCEDURE — 99233 SBSQ HOSP IP/OBS HIGH 50: CPT | Performed by: INTERNAL MEDICINE

## 2024-01-01 PROCEDURE — C1769 GUIDE WIRE: HCPCS

## 2024-01-01 PROCEDURE — 94640 AIRWAY INHALATION TREATMENT: CPT

## 2024-01-01 PROCEDURE — 85025 COMPLETE CBC W/AUTO DIFF WBC: CPT | Performed by: PATHOLOGY

## 2024-01-01 PROCEDURE — 97116 GAIT TRAINING THERAPY: CPT | Mod: GP

## 2024-01-01 PROCEDURE — 83550 IRON BINDING TEST: CPT | Performed by: PATHOLOGY

## 2024-01-01 PROCEDURE — 250N000009 HC RX 250: Performed by: EMERGENCY MEDICINE

## 2024-01-01 PROCEDURE — 81001 URINALYSIS AUTO W/SCOPE: CPT | Performed by: EMERGENCY MEDICINE

## 2024-01-01 PROCEDURE — G0463 HOSPITAL OUTPT CLINIC VISIT: HCPCS

## 2024-01-01 PROCEDURE — 80069 RENAL FUNCTION PANEL: CPT | Performed by: PATHOLOGY

## 2024-01-01 PROCEDURE — 250N000011 HC RX IP 250 OP 636

## 2024-01-01 PROCEDURE — 85610 PROTHROMBIN TIME: CPT

## 2024-01-01 PROCEDURE — 99285 EMERGENCY DEPT VISIT HI MDM: CPT | Mod: 25 | Performed by: EMERGENCY MEDICINE

## 2024-01-01 PROCEDURE — 250N000011 HC RX IP 250 OP 636: Performed by: PHYSICIAN ASSISTANT

## 2024-01-01 PROCEDURE — 78580 LUNG PERFUSION IMAGING: CPT

## 2024-01-01 PROCEDURE — 70450 CT HEAD/BRAIN W/O DYE: CPT | Mod: 76

## 2024-01-01 PROCEDURE — 120N000003 HC R&B IMCU UMMC

## 2024-01-01 PROCEDURE — 250N000009 HC RX 250: Performed by: STUDENT IN AN ORGANIZED HEALTH CARE EDUCATION/TRAINING PROGRAM

## 2024-01-01 PROCEDURE — 250N000011 HC RX IP 250 OP 636: Performed by: STUDENT IN AN ORGANIZED HEALTH CARE EDUCATION/TRAINING PROGRAM

## 2024-01-01 PROCEDURE — 93306 TTE W/DOPPLER COMPLETE: CPT | Mod: 26 | Performed by: INTERNAL MEDICINE

## 2024-01-01 PROCEDURE — 200N000002 HC R&B ICU UMMC

## 2024-01-01 PROCEDURE — 80048 BASIC METABOLIC PNL TOTAL CA: CPT

## 2024-01-01 PROCEDURE — 250N000013 HC RX MED GY IP 250 OP 250 PS 637: Performed by: ANESTHESIOLOGY

## 2024-01-01 PROCEDURE — 82330 ASSAY OF CALCIUM: CPT

## 2024-01-01 PROCEDURE — 250N000012 HC RX MED GY IP 250 OP 636 PS 637: Performed by: SURGERY

## 2024-01-01 PROCEDURE — 83605 ASSAY OF LACTIC ACID: CPT | Performed by: STUDENT IN AN ORGANIZED HEALTH CARE EDUCATION/TRAINING PROGRAM

## 2024-01-01 PROCEDURE — 81001 URINALYSIS AUTO W/SCOPE: CPT | Performed by: PHYSICIAN ASSISTANT

## 2024-01-01 PROCEDURE — 83540 ASSAY OF IRON: CPT | Performed by: PATHOLOGY

## 2024-01-01 PROCEDURE — 80197 ASSAY OF TACROLIMUS: CPT | Performed by: SURGERY

## 2024-01-01 PROCEDURE — 999N000147 HC STATISTIC PT IP EVAL DEFER

## 2024-01-01 PROCEDURE — 84550 ASSAY OF BLOOD/URIC ACID: CPT | Performed by: STUDENT IN AN ORGANIZED HEALTH CARE EDUCATION/TRAINING PROGRAM

## 2024-01-01 PROCEDURE — 93325 DOPPLER ECHO COLOR FLOW MAPG: CPT | Mod: 26 | Performed by: INTERNAL MEDICINE

## 2024-01-01 PROCEDURE — 85025 COMPLETE CBC W/AUTO DIFF WBC: CPT | Performed by: STUDENT IN AN ORGANIZED HEALTH CARE EDUCATION/TRAINING PROGRAM

## 2024-01-01 PROCEDURE — 83735 ASSAY OF MAGNESIUM: CPT | Performed by: FAMILY MEDICINE

## 2024-01-01 PROCEDURE — 86225 DNA ANTIBODY NATIVE: CPT | Performed by: INTERNAL MEDICINE

## 2024-01-01 PROCEDURE — 83735 ASSAY OF MAGNESIUM: CPT | Performed by: STUDENT IN AN ORGANIZED HEALTH CARE EDUCATION/TRAINING PROGRAM

## 2024-01-01 PROCEDURE — 99214 OFFICE O/P EST MOD 30 MIN: CPT | Performed by: INTERNAL MEDICINE

## 2024-01-01 PROCEDURE — 250N000011 HC RX IP 250 OP 636: Performed by: ANESTHESIOLOGY

## 2024-01-01 PROCEDURE — 83735 ASSAY OF MAGNESIUM: CPT | Performed by: INTERNAL MEDICINE

## 2024-01-01 PROCEDURE — G0500 MOD SEDAT ENDO SERVICE >5YRS: HCPCS | Mod: PT | Performed by: INTERNAL MEDICINE

## 2024-01-01 PROCEDURE — 80048 BASIC METABOLIC PNL TOTAL CA: CPT | Performed by: PATHOLOGY

## 2024-01-01 PROCEDURE — 85025 COMPLETE CBC W/AUTO DIFF WBC: CPT

## 2024-01-01 PROCEDURE — 86833 HLA CLASS II HIGH DEFIN QUAL: CPT | Performed by: NURSE PRACTITIONER

## 2024-01-01 PROCEDURE — 84100 ASSAY OF PHOSPHORUS: CPT

## 2024-01-01 PROCEDURE — 86780 TREPONEMA PALLIDUM: CPT | Performed by: NURSE PRACTITIONER

## 2024-01-01 PROCEDURE — 85610 PROTHROMBIN TIME: CPT | Performed by: INTERNAL MEDICINE

## 2024-01-01 PROCEDURE — 71045 X-RAY EXAM CHEST 1 VIEW: CPT | Mod: 26 | Performed by: RADIOLOGY

## 2024-01-01 PROCEDURE — 74018 RADEX ABDOMEN 1 VIEW: CPT | Mod: 26 | Performed by: RADIOLOGY

## 2024-01-01 PROCEDURE — 85027 COMPLETE CBC AUTOMATED: CPT | Performed by: STUDENT IN AN ORGANIZED HEALTH CARE EDUCATION/TRAINING PROGRAM

## 2024-01-01 PROCEDURE — 80197 ASSAY OF TACROLIMUS: CPT | Performed by: NURSE PRACTITIONER

## 2024-01-01 PROCEDURE — 93970 EXTREMITY STUDY: CPT | Mod: 26 | Performed by: STUDENT IN AN ORGANIZED HEALTH CARE EDUCATION/TRAINING PROGRAM

## 2024-01-01 PROCEDURE — 97165 OT EVAL LOW COMPLEX 30 MIN: CPT | Mod: GO | Performed by: OCCUPATIONAL THERAPIST

## 2024-01-01 PROCEDURE — 99233 SBSQ HOSP IP/OBS HIGH 50: CPT | Mod: 24 | Performed by: INTERNAL MEDICINE

## 2024-01-01 PROCEDURE — 83036 HEMOGLOBIN GLYCOSYLATED A1C: CPT

## 2024-01-01 PROCEDURE — 99214 OFFICE O/P EST MOD 30 MIN: CPT | Mod: 24 | Performed by: INTERNAL MEDICINE

## 2024-01-01 PROCEDURE — 80197 ASSAY OF TACROLIMUS: CPT | Performed by: STUDENT IN AN ORGANIZED HEALTH CARE EDUCATION/TRAINING PROGRAM

## 2024-01-01 PROCEDURE — 36415 COLL VENOUS BLD VENIPUNCTURE: CPT | Performed by: NURSE PRACTITIONER

## 2024-01-01 PROCEDURE — 36415 COLL VENOUS BLD VENIPUNCTURE: CPT | Performed by: FAMILY MEDICINE

## 2024-01-01 PROCEDURE — 84166 PROTEIN E-PHORESIS/URINE/CSF: CPT | Performed by: PATHOLOGY

## 2024-01-01 PROCEDURE — C1750 CATH, HEMODIALYSIS,LONG-TERM: HCPCS

## 2024-01-01 PROCEDURE — 71045 X-RAY EXAM CHEST 1 VIEW: CPT

## 2024-01-01 PROCEDURE — 74176 CT ABD & PELVIS W/O CONTRAST: CPT | Mod: 26 | Performed by: STUDENT IN AN ORGANIZED HEALTH CARE EDUCATION/TRAINING PROGRAM

## 2024-01-01 PROCEDURE — 70450 CT HEAD/BRAIN W/O DYE: CPT | Mod: 26 | Performed by: RADIOLOGY

## 2024-01-01 PROCEDURE — 70553 MRI BRAIN STEM W/O & W/DYE: CPT

## 2024-01-01 PROCEDURE — 82040 ASSAY OF SERUM ALBUMIN: CPT | Performed by: STUDENT IN AN ORGANIZED HEALTH CARE EDUCATION/TRAINING PROGRAM

## 2024-01-01 PROCEDURE — 86832 HLA CLASS I HIGH DEFIN QUAL: CPT

## 2024-01-01 PROCEDURE — 99285 EMERGENCY DEPT VISIT HI MDM: CPT | Performed by: FAMILY MEDICINE

## 2024-01-01 PROCEDURE — 84156 ASSAY OF PROTEIN URINE: CPT | Performed by: PATHOLOGY

## 2024-01-01 PROCEDURE — 80048 BASIC METABOLIC PNL TOTAL CA: CPT | Performed by: PHYSICIAN ASSISTANT

## 2024-01-01 PROCEDURE — 92526 ORAL FUNCTION THERAPY: CPT | Mod: GN

## 2024-01-01 PROCEDURE — 99233 SBSQ HOSP IP/OBS HIGH 50: CPT | Mod: GC | Performed by: INTERNAL MEDICINE

## 2024-01-01 PROCEDURE — 93325 DOPPLER ECHO COLOR FLOW MAPG: CPT

## 2024-01-01 PROCEDURE — 93005 ELECTROCARDIOGRAM TRACING: CPT | Performed by: FAMILY MEDICINE

## 2024-01-01 PROCEDURE — 343N000001 HC RX 343: Performed by: INTERNAL MEDICINE

## 2024-01-01 PROCEDURE — 84132 ASSAY OF SERUM POTASSIUM: CPT | Performed by: PHYSICIAN ASSISTANT

## 2024-01-01 PROCEDURE — 85520 HEPARIN ASSAY: CPT | Performed by: INTERNAL MEDICINE

## 2024-01-01 PROCEDURE — 80053 COMPREHEN METABOLIC PANEL: CPT

## 2024-01-01 PROCEDURE — 86833 HLA CLASS II HIGH DEFIN QUAL: CPT

## 2024-01-01 PROCEDURE — 99233 SBSQ HOSP IP/OBS HIGH 50: CPT | Performed by: NURSE PRACTITIONER

## 2024-01-01 PROCEDURE — 97530 THERAPEUTIC ACTIVITIES: CPT | Mod: GP

## 2024-01-01 PROCEDURE — 84484 ASSAY OF TROPONIN QUANT: CPT

## 2024-01-01 PROCEDURE — 99232 SBSQ HOSP IP/OBS MODERATE 35: CPT | Mod: GC | Performed by: STUDENT IN AN ORGANIZED HEALTH CARE EDUCATION/TRAINING PROGRAM

## 2024-01-01 PROCEDURE — 76770 US EXAM ABDO BACK WALL COMP: CPT | Mod: 26 | Performed by: STUDENT IN AN ORGANIZED HEALTH CARE EDUCATION/TRAINING PROGRAM

## 2024-01-01 PROCEDURE — 88305 TISSUE EXAM BY PATHOLOGIST: CPT | Mod: TC | Performed by: INTERNAL MEDICINE

## 2024-01-01 PROCEDURE — 92610 EVALUATE SWALLOWING FUNCTION: CPT | Mod: GN

## 2024-01-01 PROCEDURE — 84132 ASSAY OF SERUM POTASSIUM: CPT | Performed by: SURGERY

## 2024-01-01 PROCEDURE — 84484 ASSAY OF TROPONIN QUANT: CPT | Performed by: STUDENT IN AN ORGANIZED HEALTH CARE EDUCATION/TRAINING PROGRAM

## 2024-01-01 PROCEDURE — 70450 CT HEAD/BRAIN W/O DYE: CPT

## 2024-01-01 PROCEDURE — 85025 COMPLETE CBC W/AUTO DIFF WBC: CPT | Performed by: FAMILY MEDICINE

## 2024-01-01 PROCEDURE — 999N000248 HC STATISTIC IV INSERT WITH US BY RN

## 2024-01-01 PROCEDURE — 99231 SBSQ HOSP IP/OBS SF/LOW 25: CPT | Performed by: PHYSICIAN ASSISTANT

## 2024-01-01 PROCEDURE — 88346 IMFLUOR 1ST 1ANTB STAIN PX: CPT | Mod: 26 | Performed by: PATHOLOGY

## 2024-01-01 PROCEDURE — 250N000012 HC RX MED GY IP 250 OP 636 PS 637

## 2024-01-01 PROCEDURE — C1894 INTRO/SHEATH, NON-LASER: HCPCS | Performed by: INTERNAL MEDICINE

## 2024-01-01 PROCEDURE — 120N000005 HC R&B MS OVERFLOW UMMC

## 2024-01-01 PROCEDURE — 86886 COOMBS TEST INDIRECT TITER: CPT

## 2024-01-01 PROCEDURE — 99233 SBSQ HOSP IP/OBS HIGH 50: CPT | Performed by: PHYSICIAN ASSISTANT

## 2024-01-01 PROCEDURE — 85670 THROMBIN TIME PLASMA: CPT | Performed by: NURSE PRACTITIONER

## 2024-01-01 PROCEDURE — C8929 TTE W OR WO FOL WCON,DOPPLER: HCPCS

## 2024-01-01 PROCEDURE — 255N000002 HC RX 255 OP 636: Performed by: INTERNAL MEDICINE

## 2024-01-01 PROCEDURE — 87633 RESP VIRUS 12-25 TARGETS: CPT | Performed by: NURSE PRACTITIONER

## 2024-01-01 PROCEDURE — 82728 ASSAY OF FERRITIN: CPT | Performed by: PATHOLOGY

## 2024-01-01 PROCEDURE — 84540 ASSAY OF URINE/UREA-N: CPT | Performed by: PHYSICIAN ASSISTANT

## 2024-01-01 PROCEDURE — 258N000003 HC RX IP 258 OP 636

## 2024-01-01 PROCEDURE — 99222 1ST HOSP IP/OBS MODERATE 55: CPT | Mod: GC | Performed by: INTERNAL MEDICINE

## 2024-01-01 PROCEDURE — 93970 EXTREMITY STUDY: CPT | Mod: GC | Performed by: RADIOLOGY

## 2024-01-01 PROCEDURE — 96376 TX/PRO/DX INJ SAME DRUG ADON: CPT | Performed by: FAMILY MEDICINE

## 2024-01-01 PROCEDURE — 84443 ASSAY THYROID STIM HORMONE: CPT | Performed by: NURSE PRACTITIONER

## 2024-01-01 PROCEDURE — 85610 PROTHROMBIN TIME: CPT | Performed by: PATHOLOGY

## 2024-01-01 PROCEDURE — 999N000065 XR ABDOMEN PORT 1 VIEW

## 2024-01-01 PROCEDURE — 83735 ASSAY OF MAGNESIUM: CPT

## 2024-01-01 PROCEDURE — 80069 RENAL FUNCTION PANEL: CPT | Performed by: INTERNAL MEDICINE

## 2024-01-01 PROCEDURE — 999N000128 HC STATISTIC PERIPHERAL IV START W/O US GUIDANCE

## 2024-01-01 PROCEDURE — 272N000192 HC ACCESSORY CR2

## 2024-01-01 PROCEDURE — G0103 PSA SCREENING: HCPCS | Performed by: NURSE PRACTITIONER

## 2024-01-01 PROCEDURE — 85041 AUTOMATED RBC COUNT: CPT | Performed by: PHYSICIAN ASSISTANT

## 2024-01-01 PROCEDURE — A9540 TC99M MAA: HCPCS | Performed by: INTERNAL MEDICINE

## 2024-01-01 PROCEDURE — 82043 UR ALBUMIN QUANTITATIVE: CPT

## 2024-01-01 PROCEDURE — 86705 HEP B CORE ANTIBODY IGM: CPT | Performed by: NURSE PRACTITIONER

## 2024-01-01 PROCEDURE — C9113 INJ PANTOPRAZOLE SODIUM, VIA: HCPCS | Performed by: PHYSICIAN ASSISTANT

## 2024-01-01 PROCEDURE — 83883 ASSAY NEPHELOMETRY NOT SPEC: CPT | Performed by: STUDENT IN AN ORGANIZED HEALTH CARE EDUCATION/TRAINING PROGRAM

## 2024-01-01 PROCEDURE — 250N000011 HC RX IP 250 OP 636: Mod: JZ

## 2024-01-01 PROCEDURE — 84443 ASSAY THYROID STIM HORMONE: CPT | Performed by: FAMILY MEDICINE

## 2024-01-01 PROCEDURE — 84132 ASSAY OF SERUM POTASSIUM: CPT | Performed by: STUDENT IN AN ORGANIZED HEALTH CARE EDUCATION/TRAINING PROGRAM

## 2024-01-01 PROCEDURE — 86481 TB AG RESPONSE T-CELL SUSP: CPT

## 2024-01-01 PROCEDURE — 250N000013 HC RX MED GY IP 250 OP 250 PS 637: Performed by: NURSE PRACTITIONER

## 2024-01-01 PROCEDURE — 0DN80ZZ RELEASE SMALL INTESTINE, OPEN APPROACH: ICD-10-PCS | Performed by: INTERNAL MEDICINE

## 2024-01-01 PROCEDURE — 99232 SBSQ HOSP IP/OBS MODERATE 35: CPT | Performed by: INTERNAL MEDICINE

## 2024-01-01 PROCEDURE — 99239 HOSP IP/OBS DSCHRG MGMT >30: CPT | Mod: GC | Performed by: STUDENT IN AN ORGANIZED HEALTH CARE EDUCATION/TRAINING PROGRAM

## 2024-01-01 PROCEDURE — 99285 EMERGENCY DEPT VISIT HI MDM: CPT | Performed by: EMERGENCY MEDICINE

## 2024-01-01 PROCEDURE — 86334 IMMUNOFIX E-PHORESIS SERUM: CPT | Performed by: PATHOLOGY

## 2024-01-01 PROCEDURE — 258N000003 HC RX IP 258 OP 636: Performed by: STUDENT IN AN ORGANIZED HEALTH CARE EDUCATION/TRAINING PROGRAM

## 2024-01-01 PROCEDURE — 91320 SARSCV2 VAC 30MCG TRS-SUC IM: CPT | Performed by: INTERNAL MEDICINE

## 2024-01-01 PROCEDURE — 36415 COLL VENOUS BLD VENIPUNCTURE: CPT | Performed by: SURGERY

## 2024-01-01 PROCEDURE — 85730 THROMBOPLASTIN TIME PARTIAL: CPT | Performed by: PHYSICIAN ASSISTANT

## 2024-01-01 PROCEDURE — 99000 SPECIMEN HANDLING OFFICE-LAB: CPT | Performed by: PATHOLOGY

## 2024-01-01 PROCEDURE — 93308 TTE F-UP OR LMTD: CPT | Mod: 26 | Performed by: INTERNAL MEDICINE

## 2024-01-01 PROCEDURE — 93306 TTE W/DOPPLER COMPLETE: CPT

## 2024-01-01 PROCEDURE — 97110 THERAPEUTIC EXERCISES: CPT | Mod: GO

## 2024-01-01 PROCEDURE — 250N000009 HC RX 250: Performed by: SURGERY

## 2024-01-01 PROCEDURE — 272N000001 HC OR GENERAL SUPPLY STERILE: Performed by: INTERNAL MEDICINE

## 2024-01-01 PROCEDURE — 71046 X-RAY EXAM CHEST 2 VIEWS: CPT

## 2024-01-01 PROCEDURE — 272N000001 HC OR GENERAL SUPPLY STERILE: Performed by: SURGERY

## 2024-01-01 PROCEDURE — 80069 RENAL FUNCTION PANEL: CPT | Performed by: STUDENT IN AN ORGANIZED HEALTH CARE EDUCATION/TRAINING PROGRAM

## 2024-01-01 PROCEDURE — 99214 OFFICE O/P EST MOD 30 MIN: CPT | Mod: 24

## 2024-01-01 PROCEDURE — 86900 BLOOD TYPING SEROLOGIC ABO: CPT

## 2024-01-01 PROCEDURE — 85025 COMPLETE CBC W/AUTO DIFF WBC: CPT | Performed by: EMERGENCY MEDICINE

## 2024-01-01 PROCEDURE — 99207 PR NO CHARGE NURSE ONLY: CPT

## 2024-01-01 PROCEDURE — 258N000003 HC RX IP 258 OP 636: Performed by: FAMILY MEDICINE

## 2024-01-01 PROCEDURE — 710N000011 HC RECOVERY PHASE 1, LEVEL 3, PER MIN: Performed by: SURGERY

## 2024-01-01 PROCEDURE — 99203 OFFICE O/P NEW LOW 30 MIN: CPT | Performed by: SURGERY

## 2024-01-01 PROCEDURE — 87040 BLOOD CULTURE FOR BACTERIA: CPT | Performed by: SURGERY

## 2024-01-01 PROCEDURE — 258N000003 HC RX IP 258 OP 636: Performed by: ANESTHESIOLOGY

## 2024-01-01 PROCEDURE — 87340 HEPATITIS B SURFACE AG IA: CPT | Performed by: NURSE PRACTITIONER

## 2024-01-01 PROCEDURE — 99233 SBSQ HOSP IP/OBS HIGH 50: CPT | Mod: 24 | Performed by: NURSE PRACTITIONER

## 2024-01-01 PROCEDURE — 250N000009 HC RX 250: Performed by: ANESTHESIOLOGY

## 2024-01-01 PROCEDURE — 86803 HEPATITIS C AB TEST: CPT | Performed by: NURSE PRACTITIONER

## 2024-01-01 PROCEDURE — 85018 HEMOGLOBIN: CPT | Performed by: PATHOLOGY

## 2024-01-01 PROCEDURE — 99232 SBSQ HOSP IP/OBS MODERATE 35: CPT | Mod: 25 | Performed by: NURSE PRACTITIONER

## 2024-01-01 PROCEDURE — 82570 ASSAY OF URINE CREATININE: CPT

## 2024-01-01 PROCEDURE — 86704 HEP B CORE ANTIBODY TOTAL: CPT | Performed by: NURSE PRACTITIONER

## 2024-01-01 PROCEDURE — 97110 THERAPEUTIC EXERCISES: CPT | Mod: GO | Performed by: OCCUPATIONAL THERAPIST

## 2024-01-01 PROCEDURE — 80197 ASSAY OF TACROLIMUS: CPT

## 2024-01-01 PROCEDURE — 80197 ASSAY OF TACROLIMUS: CPT | Performed by: INTERNAL MEDICINE

## 2024-01-01 PROCEDURE — 71046 X-RAY EXAM CHEST 2 VIEWS: CPT | Mod: 26 | Performed by: RADIOLOGY

## 2024-01-01 PROCEDURE — 99214 OFFICE O/P EST MOD 30 MIN: CPT

## 2024-01-01 PROCEDURE — 87340 HEPATITIS B SURFACE AG IA: CPT

## 2024-01-01 PROCEDURE — 250N000009 HC RX 250: Performed by: INTERNAL MEDICINE

## 2024-01-01 PROCEDURE — 84165 PROTEIN E-PHORESIS SERUM: CPT | Mod: TC | Performed by: PATHOLOGY

## 2024-01-01 PROCEDURE — 36558 INSERT TUNNELED CV CATH: CPT | Mod: RT | Performed by: PHYSICIAN ASSISTANT

## 2024-01-01 PROCEDURE — 90480 ADMN SARSCOV2 VAC 1/ONLY CMP: CPT | Performed by: INTERNAL MEDICINE

## 2024-01-01 PROCEDURE — 86644 CMV ANTIBODY: CPT | Performed by: NURSE PRACTITIONER

## 2024-01-01 PROCEDURE — 93970 EXTREMITY STUDY: CPT

## 2024-01-01 PROCEDURE — 99204 OFFICE O/P NEW MOD 45 MIN: CPT | Performed by: UROLOGY

## 2024-01-01 PROCEDURE — 87641 MR-STAPH DNA AMP PROBE: CPT | Performed by: NURSE PRACTITIONER

## 2024-01-01 PROCEDURE — 85610 PROTHROMBIN TIME: CPT | Performed by: FAMILY MEDICINE

## 2024-01-01 PROCEDURE — 71046 X-RAY EXAM CHEST 2 VIEWS: CPT | Mod: GC | Performed by: RADIOLOGY

## 2024-01-01 PROCEDURE — 93005 ELECTROCARDIOGRAM TRACING: CPT | Performed by: EMERGENCY MEDICINE

## 2024-01-01 PROCEDURE — 83605 ASSAY OF LACTIC ACID: CPT

## 2024-01-01 PROCEDURE — 84520 ASSAY OF UREA NITROGEN: CPT

## 2024-01-01 PROCEDURE — 99214 OFFICE O/P EST MOD 30 MIN: CPT | Performed by: TRANSPLANT SURGERY

## 2024-01-01 PROCEDURE — 97535 SELF CARE MNGMENT TRAINING: CPT | Mod: GO

## 2024-01-01 PROCEDURE — 81003 URINALYSIS AUTO W/O SCOPE: CPT | Performed by: NURSE PRACTITIONER

## 2024-01-01 PROCEDURE — 99215 OFFICE O/P EST HI 40 MIN: CPT | Mod: 25 | Performed by: INTERNAL MEDICINE

## 2024-01-01 PROCEDURE — 86832 HLA CLASS I HIGH DEFIN QUAL: CPT | Performed by: STUDENT IN AN ORGANIZED HEALTH CARE EDUCATION/TRAINING PROGRAM

## 2024-01-01 PROCEDURE — 82565 ASSAY OF CREATININE: CPT | Performed by: INTERNAL MEDICINE

## 2024-01-01 PROCEDURE — 87635 SARS-COV-2 COVID-19 AMP PRB: CPT | Performed by: NURSE PRACTITIONER

## 2024-01-01 PROCEDURE — 84550 ASSAY OF BLOOD/URIC ACID: CPT | Performed by: PATHOLOGY

## 2024-01-01 PROCEDURE — 87040 BLOOD CULTURE FOR BACTERIA: CPT | Performed by: STUDENT IN AN ORGANIZED HEALTH CARE EDUCATION/TRAINING PROGRAM

## 2024-01-01 PROCEDURE — 82533 TOTAL CORTISOL: CPT | Performed by: NURSE PRACTITIONER

## 2024-01-01 PROCEDURE — 82803 BLOOD GASES ANY COMBINATION: CPT

## 2024-01-01 PROCEDURE — 83880 ASSAY OF NATRIURETIC PEPTIDE: CPT | Performed by: STUDENT IN AN ORGANIZED HEALTH CARE EDUCATION/TRAINING PROGRAM

## 2024-01-01 PROCEDURE — 69209 REMOVE IMPACTED EAR WAX UNI: CPT | Mod: 50 | Performed by: INTERNAL MEDICINE

## 2024-01-01 PROCEDURE — 71250 CT THORAX DX C-: CPT | Mod: 26 | Performed by: RADIOLOGY

## 2024-01-01 PROCEDURE — G0463 HOSPITAL OUTPT CLINIC VISIT: HCPCS | Performed by: INTERNAL MEDICINE

## 2024-01-01 PROCEDURE — 4A023N6 MEASUREMENT OF CARDIAC SAMPLING AND PRESSURE, RIGHT HEART, PERCUTANEOUS APPROACH: ICD-10-PCS | Performed by: INTERNAL MEDICINE

## 2024-01-01 PROCEDURE — 74018 RADEX ABDOMEN 1 VIEW: CPT

## 2024-01-01 PROCEDURE — 83521 IG LIGHT CHAINS FREE EACH: CPT | Performed by: STUDENT IN AN ORGANIZED HEALTH CARE EDUCATION/TRAINING PROGRAM

## 2024-01-01 PROCEDURE — 86036 ANCA SCREEN EACH ANTIBODY: CPT | Performed by: INTERNAL MEDICINE

## 2024-01-01 PROCEDURE — 96374 THER/PROPH/DIAG INJ IV PUSH: CPT | Performed by: FAMILY MEDICINE

## 2024-01-01 PROCEDURE — 96361 HYDRATE IV INFUSION ADD-ON: CPT | Performed by: FAMILY MEDICINE

## 2024-01-01 PROCEDURE — 99152 MOD SED SAME PHYS/QHP 5/>YRS: CPT | Performed by: PHYSICIAN ASSISTANT

## 2024-01-01 PROCEDURE — 99214 OFFICE O/P EST MOD 30 MIN: CPT | Performed by: PHYSICIAN ASSISTANT

## 2024-01-01 PROCEDURE — 99232 SBSQ HOSP IP/OBS MODERATE 35: CPT | Performed by: PHYSICIAN ASSISTANT

## 2024-01-01 PROCEDURE — 97535 SELF CARE MNGMENT TRAINING: CPT | Mod: GO | Performed by: OCCUPATIONAL THERAPIST

## 2024-01-01 PROCEDURE — 74018 RADEX ABDOMEN 1 VIEW: CPT | Mod: 26 | Performed by: STUDENT IN AN ORGANIZED HEALTH CARE EDUCATION/TRAINING PROGRAM

## 2024-01-01 PROCEDURE — 45385 COLONOSCOPY W/LESION REMOVAL: CPT | Mod: PT | Performed by: INTERNAL MEDICINE

## 2024-01-01 PROCEDURE — 82310 ASSAY OF CALCIUM: CPT | Performed by: STUDENT IN AN ORGANIZED HEALTH CARE EDUCATION/TRAINING PROGRAM

## 2024-01-01 PROCEDURE — 999N000208 ECHOCARDIOGRAM COMPLETE

## 2024-01-01 PROCEDURE — 80061 LIPID PANEL: CPT

## 2024-01-01 PROCEDURE — 83690 ASSAY OF LIPASE: CPT | Performed by: PHYSICIAN ASSISTANT

## 2024-01-01 PROCEDURE — 99214 OFFICE O/P EST MOD 30 MIN: CPT | Mod: 25 | Performed by: INTERNAL MEDICINE

## 2024-01-01 PROCEDURE — 88342 IMHCHEM/IMCYTCHM 1ST ANTB: CPT | Mod: 26 | Performed by: PATHOLOGY

## 2024-01-01 PROCEDURE — 99232 SBSQ HOSP IP/OBS MODERATE 35: CPT | Performed by: NURSE PRACTITIONER

## 2024-01-01 PROCEDURE — 83945 ASSAY OF OXALATE: CPT | Mod: 90 | Performed by: PATHOLOGY

## 2024-01-01 PROCEDURE — 36415 COLL VENOUS BLD VENIPUNCTURE: CPT | Performed by: EMERGENCY MEDICINE

## 2024-01-01 PROCEDURE — 87799 DETECT AGENT NOS DNA QUANT: CPT | Performed by: STUDENT IN AN ORGANIZED HEALTH CARE EDUCATION/TRAINING PROGRAM

## 2024-01-01 PROCEDURE — 99232 SBSQ HOSP IP/OBS MODERATE 35: CPT | Mod: GC | Performed by: INTERNAL MEDICINE

## 2024-01-01 PROCEDURE — 76937 US GUIDE VASCULAR ACCESS: CPT | Mod: 26 | Performed by: PHYSICIAN ASSISTANT

## 2024-01-01 PROCEDURE — 83970 ASSAY OF PARATHORMONE: CPT | Performed by: PATHOLOGY

## 2024-01-01 PROCEDURE — 85027 COMPLETE CBC AUTOMATED: CPT | Performed by: PATHOLOGY

## 2024-01-01 PROCEDURE — 99253 IP/OBS CNSLTJ NEW/EST LOW 45: CPT | Mod: FS | Performed by: PHYSICIAN ASSISTANT

## 2024-01-01 PROCEDURE — 85390 FIBRINOLYSINS SCREEN I&R: CPT | Mod: 26 | Performed by: PATHOLOGY

## 2024-01-01 PROCEDURE — 99153 MOD SED SAME PHYS/QHP EA: CPT | Performed by: INTERNAL MEDICINE

## 2024-01-01 PROCEDURE — 84132 ASSAY OF SERUM POTASSIUM: CPT

## 2024-01-01 PROCEDURE — 71250 CT THORAX DX C-: CPT

## 2024-01-01 PROCEDURE — 93248 EXT ECG>7D<15D REV&INTERPJ: CPT | Performed by: INTERNAL MEDICINE

## 2024-01-01 PROCEDURE — 999N000215 HC STATISTIC HFNC ADULT NON-CPAP

## 2024-01-01 PROCEDURE — 71045 X-RAY EXAM CHEST 1 VIEW: CPT | Mod: 26 | Performed by: STUDENT IN AN ORGANIZED HEALTH CARE EDUCATION/TRAINING PROGRAM

## 2024-01-01 PROCEDURE — 82803 BLOOD GASES ANY COMBINATION: CPT | Performed by: STUDENT IN AN ORGANIZED HEALTH CARE EDUCATION/TRAINING PROGRAM

## 2024-01-01 PROCEDURE — 99232 SBSQ HOSP IP/OBS MODERATE 35: CPT | Mod: 25 | Performed by: PHYSICIAN ASSISTANT

## 2024-01-01 PROCEDURE — 93321 DOPPLER ECHO F-UP/LMTD STD: CPT | Mod: 26 | Performed by: INTERNAL MEDICINE

## 2024-01-01 PROCEDURE — 99205 OFFICE O/P NEW HI 60 MIN: CPT | Mod: 95 | Performed by: STUDENT IN AN ORGANIZED HEALTH CARE EDUCATION/TRAINING PROGRAM

## 2024-01-01 PROCEDURE — 85610 PROTHROMBIN TIME: CPT | Performed by: PHYSICIAN ASSISTANT

## 2024-01-01 PROCEDURE — 80048 BASIC METABOLIC PNL TOTAL CA: CPT | Performed by: STUDENT IN AN ORGANIZED HEALTH CARE EDUCATION/TRAINING PROGRAM

## 2024-01-01 PROCEDURE — 120N000002 HC R&B MED SURG/OB UMMC

## 2024-01-01 PROCEDURE — 85379 FIBRIN DEGRADATION QUANT: CPT | Performed by: EMERGENCY MEDICINE

## 2024-01-01 PROCEDURE — 83945 ASSAY OF OXALATE: CPT | Performed by: INTERNAL MEDICINE

## 2024-01-01 PROCEDURE — 93505 ENDOMYOCARDIAL BIOPSY: CPT | Mod: 26 | Performed by: INTERNAL MEDICINE

## 2024-01-01 PROCEDURE — G2211 COMPLEX E/M VISIT ADD ON: HCPCS | Mod: 95 | Performed by: STUDENT IN AN ORGANIZED HEALTH CARE EDUCATION/TRAINING PROGRAM

## 2024-01-01 PROCEDURE — 82803 BLOOD GASES ANY COMBINATION: CPT | Performed by: ANESTHESIOLOGY

## 2024-01-01 PROCEDURE — 85520 HEPARIN ASSAY: CPT | Performed by: PHYSICIAN ASSISTANT

## 2024-01-01 PROCEDURE — 93010 ELECTROCARDIOGRAM REPORT: CPT | Performed by: EMERGENCY MEDICINE

## 2024-01-01 PROCEDURE — 272N000504 HC NEEDLE CR4

## 2024-01-01 PROCEDURE — 85027 COMPLETE CBC AUTOMATED: CPT | Performed by: INTERNAL MEDICINE

## 2024-01-01 PROCEDURE — 87637 SARSCOV2&INF A&B&RSV AMP PRB: CPT

## 2024-01-01 PROCEDURE — G0463 HOSPITAL OUTPT CLINIC VISIT: HCPCS | Performed by: TRANSPLANT SURGERY

## 2024-01-01 PROCEDURE — 84484 ASSAY OF TROPONIN QUANT: CPT | Performed by: EMERGENCY MEDICINE

## 2024-01-01 PROCEDURE — 99222 1ST HOSP IP/OBS MODERATE 55: CPT | Mod: 25 | Performed by: STUDENT IN AN ORGANIZED HEALTH CARE EDUCATION/TRAINING PROGRAM

## 2024-01-01 PROCEDURE — 70553 MRI BRAIN STEM W/O & W/DYE: CPT | Mod: 26 | Performed by: RADIOLOGY

## 2024-01-01 PROCEDURE — 999N000142 HC STATISTIC PROCEDURE PREP ONLY

## 2024-01-01 PROCEDURE — 84155 ASSAY OF PROTEIN SERUM: CPT | Performed by: STUDENT IN AN ORGANIZED HEALTH CARE EDUCATION/TRAINING PROGRAM

## 2024-01-01 PROCEDURE — 86335 IMMUNFIX E-PHORSIS/URINE/CSF: CPT | Mod: 26 | Performed by: PATHOLOGY

## 2024-01-01 PROCEDURE — 999N000132 HC STATISTIC PP CARE STAGE 1

## 2024-01-01 PROCEDURE — G2211 COMPLEX E/M VISIT ADD ON: HCPCS | Performed by: INTERNAL MEDICINE

## 2024-01-01 PROCEDURE — 84443 ASSAY THYROID STIM HORMONE: CPT

## 2024-01-01 PROCEDURE — 99222 1ST HOSP IP/OBS MODERATE 55: CPT | Performed by: INTERNAL MEDICINE

## 2024-01-01 PROCEDURE — 88350 IMFLUOR EA ADDL 1ANTB STN PX: CPT | Mod: 26 | Performed by: PATHOLOGY

## 2024-01-01 PROCEDURE — 86335 IMMUNFIX E-PHORSIS/URINE/CSF: CPT | Performed by: PATHOLOGY

## 2024-01-01 PROCEDURE — 86833 HLA CLASS II HIGH DEFIN QUAL: CPT | Performed by: STUDENT IN AN ORGANIZED HEALTH CARE EDUCATION/TRAINING PROGRAM

## 2024-01-01 PROCEDURE — 85520 HEPARIN ASSAY: CPT | Performed by: NURSE PRACTITIONER

## 2024-01-01 PROCEDURE — G0103 PSA SCREENING: HCPCS | Performed by: INTERNAL MEDICINE

## 2024-01-01 PROCEDURE — 83010 ASSAY OF HAPTOGLOBIN QUANT: CPT | Performed by: STUDENT IN AN ORGANIZED HEALTH CARE EDUCATION/TRAINING PROGRAM

## 2024-01-01 PROCEDURE — 99233 SBSQ HOSP IP/OBS HIGH 50: CPT | Mod: 24 | Performed by: SURGERY

## 2024-01-01 PROCEDURE — 86352 CELL FUNCTION ASSAY W/STIM: CPT | Performed by: INTERNAL MEDICINE

## 2024-01-01 PROCEDURE — 80053 COMPREHEN METABOLIC PANEL: CPT | Performed by: EMERGENCY MEDICINE

## 2024-01-01 PROCEDURE — 71250 CT THORAX DX C-: CPT | Mod: 26 | Performed by: STUDENT IN AN ORGANIZED HEALTH CARE EDUCATION/TRAINING PROGRAM

## 2024-01-01 PROCEDURE — 86832 HLA CLASS I HIGH DEFIN QUAL: CPT | Performed by: NURSE PRACTITIONER

## 2024-01-01 PROCEDURE — 78580 LUNG PERFUSION IMAGING: CPT | Mod: 26 | Performed by: STUDENT IN AN ORGANIZED HEALTH CARE EDUCATION/TRAINING PROGRAM

## 2024-01-01 PROCEDURE — 88305 TISSUE EXAM BY PATHOLOGIST: CPT | Mod: 26 | Performed by: PATHOLOGY

## 2024-01-01 PROCEDURE — 86147 CARDIOLIPIN ANTIBODY EA IG: CPT | Performed by: NURSE PRACTITIONER

## 2024-01-01 PROCEDURE — 76770 US EXAM ABDO BACK WALL COMP: CPT

## 2024-01-01 PROCEDURE — 86705 HEP B CORE ANTIBODY IGM: CPT

## 2024-01-01 PROCEDURE — 99233 SBSQ HOSP IP/OBS HIGH 50: CPT | Mod: 25

## 2024-01-01 PROCEDURE — 82728 ASSAY OF FERRITIN: CPT | Performed by: STUDENT IN AN ORGANIZED HEALTH CARE EDUCATION/TRAINING PROGRAM

## 2024-01-01 PROCEDURE — 88307 TISSUE EXAM BY PATHOLOGIST: CPT | Mod: 26 | Performed by: PATHOLOGY

## 2024-01-01 PROCEDURE — 80053 COMPREHEN METABOLIC PANEL: CPT | Performed by: FAMILY MEDICINE

## 2024-01-01 PROCEDURE — 80053 COMPREHEN METABOLIC PANEL: CPT | Performed by: PHYSICIAN ASSISTANT

## 2024-01-01 PROCEDURE — 82962 GLUCOSE BLOOD TEST: CPT

## 2024-01-01 PROCEDURE — 83735 ASSAY OF MAGNESIUM: CPT | Performed by: PATHOLOGY

## 2024-01-01 PROCEDURE — 99152 MOD SED SAME PHYS/QHP 5/>YRS: CPT

## 2024-01-01 PROCEDURE — 82610 CYSTATIN C: CPT | Performed by: INTERNAL MEDICINE

## 2024-01-01 PROCEDURE — 82803 BLOOD GASES ANY COMBINATION: CPT | Performed by: NURSE PRACTITIONER

## 2024-01-01 PROCEDURE — 99222 1ST HOSP IP/OBS MODERATE 55: CPT | Mod: GC | Performed by: STUDENT IN AN ORGANIZED HEALTH CARE EDUCATION/TRAINING PROGRAM

## 2024-01-01 PROCEDURE — 87581 M.PNEUMON DNA AMP PROBE: CPT | Performed by: STUDENT IN AN ORGANIZED HEALTH CARE EDUCATION/TRAINING PROGRAM

## 2024-01-01 PROCEDURE — G0463 HOSPITAL OUTPT CLINIC VISIT: HCPCS | Performed by: SURGERY

## 2024-01-01 PROCEDURE — 99223 1ST HOSP IP/OBS HIGH 75: CPT | Mod: 25 | Performed by: INTERNAL MEDICINE

## 2024-01-01 PROCEDURE — 97161 PT EVAL LOW COMPLEX 20 MIN: CPT | Mod: GP

## 2024-01-01 PROCEDURE — 86706 HEP B SURFACE ANTIBODY: CPT

## 2024-01-01 PROCEDURE — 250N000011 HC RX IP 250 OP 636: Mod: JZ | Performed by: NURSE PRACTITIONER

## 2024-01-01 PROCEDURE — 45380 COLONOSCOPY AND BIOPSY: CPT | Performed by: INTERNAL MEDICINE

## 2024-01-01 PROCEDURE — 96360 HYDRATION IV INFUSION INIT: CPT | Performed by: FAMILY MEDICINE

## 2024-01-01 PROCEDURE — 250N000011 HC RX IP 250 OP 636: Performed by: INTERNAL MEDICINE

## 2024-01-01 PROCEDURE — 84300 ASSAY OF URINE SODIUM: CPT | Performed by: STUDENT IN AN ORGANIZED HEALTH CARE EDUCATION/TRAINING PROGRAM

## 2024-01-01 PROCEDURE — 85730 THROMBOPLASTIN TIME PARTIAL: CPT | Performed by: FAMILY MEDICINE

## 2024-01-01 PROCEDURE — 85018 HEMOGLOBIN: CPT

## 2024-01-01 PROCEDURE — 86160 COMPLEMENT ANTIGEN: CPT | Performed by: INTERNAL MEDICINE

## 2024-01-01 PROCEDURE — 81001 URINALYSIS AUTO W/SCOPE: CPT | Performed by: INTERNAL MEDICINE

## 2024-01-01 PROCEDURE — 84165 PROTEIN E-PHORESIS SERUM: CPT | Mod: 26 | Performed by: PATHOLOGY

## 2024-01-01 PROCEDURE — 93010 ELECTROCARDIOGRAM REPORT: CPT | Performed by: FAMILY MEDICINE

## 2024-01-01 PROCEDURE — 86901 BLOOD TYPING SEROLOGIC RH(D): CPT

## 2024-01-01 PROCEDURE — 49000 EXPLORATION OF ABDOMEN: CPT | Mod: 22 | Performed by: SURGERY

## 2024-01-01 PROCEDURE — 84484 ASSAY OF TROPONIN QUANT: CPT | Performed by: FAMILY MEDICINE

## 2024-01-01 PROCEDURE — 80053 COMPREHEN METABOLIC PANEL: CPT | Performed by: PATHOLOGY

## 2024-01-01 PROCEDURE — 74176 CT ABD & PELVIS W/O CONTRAST: CPT

## 2024-01-01 PROCEDURE — 83615 LACTATE (LD) (LDH) ENZYME: CPT | Performed by: STUDENT IN AN ORGANIZED HEALTH CARE EDUCATION/TRAINING PROGRAM

## 2024-01-01 PROCEDURE — 76705 ECHO EXAM OF ABDOMEN: CPT | Mod: 26 | Performed by: RADIOLOGY

## 2024-01-01 PROCEDURE — 77001 FLUOROGUIDE FOR VEIN DEVICE: CPT | Mod: 26 | Performed by: PHYSICIAN ASSISTANT

## 2024-01-01 PROCEDURE — 84156 ASSAY OF PROTEIN URINE: CPT

## 2024-01-01 PROCEDURE — 370N000017 HC ANESTHESIA TECHNICAL FEE, PER MIN: Performed by: SURGERY

## 2024-01-01 PROCEDURE — 97530 THERAPEUTIC ACTIVITIES: CPT | Mod: GO

## 2024-01-01 PROCEDURE — 86665 EPSTEIN-BARR CAPSID VCA: CPT | Performed by: NURSE PRACTITIONER

## 2024-01-01 PROCEDURE — 84166 PROTEIN E-PHORESIS/URINE/CSF: CPT | Mod: 26 | Performed by: PATHOLOGY

## 2024-01-01 PROCEDURE — 86787 VARICELLA-ZOSTER ANTIBODY: CPT | Performed by: NURSE PRACTITIONER

## 2024-01-01 PROCEDURE — 999N000065 XR CHEST PORT 1 VIEW

## 2024-01-01 PROCEDURE — 250N000025 HC SEVOFLURANE, PER MIN: Performed by: SURGERY

## 2024-01-01 PROCEDURE — A9585 GADOBUTROL INJECTION: HCPCS | Performed by: INTERNAL MEDICINE

## 2024-01-01 PROCEDURE — 86334 IMMUNOFIX E-PHORESIS SERUM: CPT | Mod: 26 | Performed by: PATHOLOGY

## 2024-01-01 PROCEDURE — 82306 VITAMIN D 25 HYDROXY: CPT | Performed by: STUDENT IN AN ORGANIZED HEALTH CARE EDUCATION/TRAINING PROGRAM

## 2024-01-01 PROCEDURE — C1751 CATH, INF, PER/CENT/MIDLINE: HCPCS | Performed by: INTERNAL MEDICINE

## 2024-01-01 PROCEDURE — 86481 TB AG RESPONSE T-CELL SUSP: CPT | Performed by: NURSE PRACTITIONER

## 2024-01-01 PROCEDURE — 88342 IMHCHEM/IMCYTCHM 1ST ANTB: CPT | Mod: TC | Performed by: INTERNAL MEDICINE

## 2024-01-01 PROCEDURE — 85730 THROMBOPLASTIN TIME PARTIAL: CPT | Performed by: NURSE PRACTITIONER

## 2024-01-01 PROCEDURE — 76705 ECHO EXAM OF ABDOMEN: CPT

## 2024-01-01 PROCEDURE — 02BM3ZX EXCISION OF VENTRICULAR SEPTUM, PERCUTANEOUS APPROACH, DIAGNOSTIC: ICD-10-PCS | Performed by: INTERNAL MEDICINE

## 2024-01-01 PROCEDURE — 999N000043 HC STATISTIC CTO2 CONT OXYGEN TECH TIME EA 90 MIN

## 2024-01-01 PROCEDURE — 86704 HEP B CORE ANTIBODY TOTAL: CPT

## 2024-01-01 PROCEDURE — 99205 OFFICE O/P NEW HI 60 MIN: CPT | Performed by: NURSE PRACTITIONER

## 2024-01-01 PROCEDURE — 81001 URINALYSIS AUTO W/SCOPE: CPT | Performed by: FAMILY MEDICINE

## 2024-01-01 PROCEDURE — 81001 URINALYSIS AUTO W/SCOPE: CPT

## 2024-01-01 PROCEDURE — 96375 TX/PRO/DX INJ NEW DRUG ADDON: CPT | Performed by: FAMILY MEDICINE

## 2024-01-01 PROCEDURE — 81382 HLA II TYPING 1 LOC HR: CPT | Performed by: NURSE PRACTITIONER

## 2024-01-01 PROCEDURE — 83880 ASSAY OF NATRIURETIC PEPTIDE: CPT | Performed by: EMERGENCY MEDICINE

## 2024-01-01 PROCEDURE — 97166 OT EVAL MOD COMPLEX 45 MIN: CPT | Mod: GO | Performed by: OCCUPATIONAL THERAPIST

## 2024-01-01 RX ORDER — LIDOCAINE 40 MG/G
CREAM TOPICAL
Status: DISCONTINUED | OUTPATIENT
Start: 2024-01-01 | End: 2024-01-01 | Stop reason: HOSPADM

## 2024-01-01 RX ORDER — METHYLPREDNISOLONE SODIUM SUCCINATE 125 MG/2ML
125 INJECTION, POWDER, LYOPHILIZED, FOR SOLUTION INTRAMUSCULAR; INTRAVENOUS
Start: 2024-01-01

## 2024-01-01 RX ORDER — PIPERACILLIN SODIUM, TAZOBACTAM SODIUM 3; .375 G/15ML; G/15ML
INJECTION, POWDER, LYOPHILIZED, FOR SOLUTION INTRAVENOUS PRN
Status: DISCONTINUED | OUTPATIENT
Start: 2024-01-01 | End: 2024-01-01

## 2024-01-01 RX ORDER — HYDROMORPHONE HYDROCHLORIDE 1 MG/ML
0.4 INJECTION, SOLUTION INTRAMUSCULAR; INTRAVENOUS; SUBCUTANEOUS EVERY 5 MIN PRN
Status: DISCONTINUED | OUTPATIENT
Start: 2024-01-01 | End: 2024-01-01 | Stop reason: HOSPADM

## 2024-01-01 RX ORDER — WARFARIN SODIUM 7.5 MG
3.75 TABLET ORAL
Status: COMPLETED | OUTPATIENT
Start: 2024-01-01 | End: 2024-01-01

## 2024-01-01 RX ORDER — HYDROMORPHONE HCL IN WATER/PF 6 MG/30 ML
0.2 PATIENT CONTROLLED ANALGESIA SYRINGE INTRAVENOUS EVERY 4 HOURS PRN
Status: DISCONTINUED | OUTPATIENT
Start: 2024-01-01 | End: 2024-01-01 | Stop reason: HOSPADM

## 2024-01-01 RX ORDER — CALCIUM CARBONATE 500 MG/1
1000 TABLET, CHEWABLE ORAL 4 TIMES DAILY PRN
Status: CANCELLED | OUTPATIENT
Start: 2024-01-01

## 2024-01-01 RX ORDER — LIDOCAINE 40 MG/G
CREAM TOPICAL
Status: DISCONTINUED | OUTPATIENT
Start: 2024-01-01 | End: 2024-01-01

## 2024-01-01 RX ORDER — ALLOPURINOL 300 MG/1
300 TABLET ORAL DAILY
Status: DISCONTINUED | OUTPATIENT
Start: 2024-01-01 | End: 2024-01-01 | Stop reason: HOSPADM

## 2024-01-01 RX ORDER — GABAPENTIN 300 MG/1
300 CAPSULE ORAL DAILY
COMMUNITY

## 2024-01-01 RX ORDER — TAMSULOSIN HYDROCHLORIDE 0.4 MG/1
0.4 CAPSULE ORAL DAILY
Status: CANCELLED | OUTPATIENT
Start: 2024-01-01

## 2024-01-01 RX ORDER — CALCIUM CARBONATE 500 MG/1
1 TABLET, CHEWABLE ORAL 2 TIMES DAILY
COMMUNITY
Start: 2024-01-01

## 2024-01-01 RX ORDER — BUMETANIDE 1 MG/1
1 TABLET ORAL DAILY
Status: DISCONTINUED | OUTPATIENT
Start: 2024-01-01 | End: 2024-01-01 | Stop reason: HOSPADM

## 2024-01-01 RX ORDER — BUPROPION HYDROCHLORIDE 75 MG/1
75 TABLET ORAL 2 TIMES DAILY
Status: DISCONTINUED | OUTPATIENT
Start: 2024-01-01 | End: 2024-01-01

## 2024-01-01 RX ORDER — ALLOPURINOL 100 MG/1
100 TABLET ORAL DAILY
Status: DISCONTINUED | OUTPATIENT
Start: 2024-01-01 | End: 2024-01-01 | Stop reason: HOSPADM

## 2024-01-01 RX ORDER — ROSUVASTATIN CALCIUM 10 MG/1
10 TABLET, COATED ORAL DAILY
Status: DISCONTINUED | OUTPATIENT
Start: 2024-01-01 | End: 2024-01-01 | Stop reason: HOSPADM

## 2024-01-01 RX ORDER — ESMOLOL HYDROCHLORIDE 10 MG/ML
INJECTION INTRAVENOUS PRN
Status: DISCONTINUED | OUTPATIENT
Start: 2024-01-01 | End: 2024-01-01

## 2024-01-01 RX ORDER — LIDOCAINE HYDROCHLORIDE 20 MG/ML
INJECTION, SOLUTION INFILTRATION; PERINEURAL PRN
Status: DISCONTINUED | OUTPATIENT
Start: 2024-01-01 | End: 2024-01-01

## 2024-01-01 RX ORDER — TACROLIMUS 0.5 MG/1
0.5 CAPSULE ORAL EVERY MORNING
Status: CANCELLED | OUTPATIENT
Start: 2024-01-01

## 2024-01-01 RX ORDER — LIDOCAINE 4 G/G
2 PATCH TOPICAL
Status: DISCONTINUED | OUTPATIENT
Start: 2024-01-01 | End: 2024-01-01 | Stop reason: HOSPADM

## 2024-01-01 RX ORDER — ALBUTEROL SULFATE 90 UG/1
2 AEROSOL, METERED RESPIRATORY (INHALATION) EVERY 4 HOURS PRN
Status: DISCONTINUED | OUTPATIENT
Start: 2024-01-01 | End: 2024-01-01 | Stop reason: HOSPADM

## 2024-01-01 RX ORDER — ACETAMINOPHEN 325 MG/1
975 TABLET ORAL EVERY 8 HOURS PRN
Status: CANCELLED | OUTPATIENT
Start: 2024-01-01

## 2024-01-01 RX ORDER — BUMETANIDE 1 MG/1
3 TABLET ORAL DAILY
Qty: 540 TABLET | Refills: 3 | Status: SHIPPED | OUTPATIENT
Start: 2024-01-01 | End: 2024-01-01

## 2024-01-01 RX ORDER — NALOXONE HYDROCHLORIDE 0.4 MG/ML
0.2 INJECTION, SOLUTION INTRAMUSCULAR; INTRAVENOUS; SUBCUTANEOUS
Status: DISCONTINUED | OUTPATIENT
Start: 2024-01-01 | End: 2024-01-01 | Stop reason: HOSPADM

## 2024-01-01 RX ORDER — ONDANSETRON 2 MG/ML
4 INJECTION INTRAMUSCULAR; INTRAVENOUS
Status: DISCONTINUED | OUTPATIENT
Start: 2024-01-01 | End: 2024-01-01 | Stop reason: HOSPADM

## 2024-01-01 RX ORDER — ONDANSETRON 2 MG/ML
4 INJECTION INTRAMUSCULAR; INTRAVENOUS EVERY 6 HOURS PRN
Status: DISCONTINUED | OUTPATIENT
Start: 2024-01-01 | End: 2024-01-01 | Stop reason: HOSPADM

## 2024-01-01 RX ORDER — NICOTINE POLACRILEX 4 MG
15-30 LOZENGE BUCCAL
Status: DISCONTINUED | OUTPATIENT
Start: 2024-01-01 | End: 2024-01-01

## 2024-01-01 RX ORDER — WARFARIN SODIUM 5 MG/1
5 TABLET ORAL
Status: COMPLETED | OUTPATIENT
Start: 2024-01-01 | End: 2024-01-01

## 2024-01-01 RX ORDER — MYCOPHENOLATE MOFETIL 250 MG/1
250 CAPSULE ORAL
Status: DISCONTINUED | OUTPATIENT
Start: 2024-01-01 | End: 2024-01-01

## 2024-01-01 RX ORDER — PROPOFOL 10 MG/ML
INJECTION, EMULSION INTRAVENOUS PRN
Status: DISCONTINUED | OUTPATIENT
Start: 2024-01-01 | End: 2024-01-01

## 2024-01-01 RX ORDER — TAMSULOSIN HYDROCHLORIDE 0.4 MG/1
0.4 CAPSULE ORAL DAILY
Qty: 90 CAPSULE | Refills: 1 | Status: SHIPPED | OUTPATIENT
Start: 2024-01-01 | End: 2024-01-01

## 2024-01-01 RX ORDER — NALOXONE HYDROCHLORIDE 0.4 MG/ML
0.4 INJECTION, SOLUTION INTRAMUSCULAR; INTRAVENOUS; SUBCUTANEOUS
Status: DISCONTINUED | OUTPATIENT
Start: 2024-01-01 | End: 2024-01-01 | Stop reason: HOSPADM

## 2024-01-01 RX ORDER — DIPHENHYDRAMINE HYDROCHLORIDE 50 MG/ML
50 INJECTION INTRAMUSCULAR; INTRAVENOUS
Start: 2024-01-01

## 2024-01-01 RX ORDER — AMOXICILLIN 250 MG
2 CAPSULE ORAL 2 TIMES DAILY PRN
Status: DISCONTINUED | OUTPATIENT
Start: 2024-01-01 | End: 2024-01-01 | Stop reason: HOSPADM

## 2024-01-01 RX ORDER — FENTANYL CITRATE 50 UG/ML
50 INJECTION, SOLUTION INTRAMUSCULAR; INTRAVENOUS EVERY 5 MIN PRN
Status: DISCONTINUED | OUTPATIENT
Start: 2024-01-01 | End: 2024-01-01 | Stop reason: HOSPADM

## 2024-01-01 RX ORDER — TRAMADOL HYDROCHLORIDE 50 MG/1
TABLET ORAL
Qty: 120 TABLET | OUTPATIENT
Start: 2024-01-01

## 2024-01-01 RX ORDER — DEXTROSE MONOHYDRATE 25 G/50ML
25-50 INJECTION, SOLUTION INTRAVENOUS
Status: DISCONTINUED | OUTPATIENT
Start: 2024-01-01 | End: 2024-01-01

## 2024-01-01 RX ORDER — METHOCARBAMOL 100 MG/ML
1000 INJECTION, SOLUTION INTRAMUSCULAR; INTRAVENOUS EVERY 6 HOURS
Status: DISCONTINUED | OUTPATIENT
Start: 2024-01-01 | End: 2024-01-01

## 2024-01-01 RX ORDER — HYDRALAZINE HYDROCHLORIDE 25 MG/1
25 TABLET, FILM COATED ORAL 4 TIMES DAILY
Qty: 120 TABLET | Refills: 0 | Status: SHIPPED | OUTPATIENT
Start: 2024-01-01 | End: 2024-01-01

## 2024-01-01 RX ORDER — TRAMADOL HYDROCHLORIDE 50 MG/1
50 TABLET ORAL EVERY 6 HOURS PRN
COMMUNITY
End: 2024-01-01

## 2024-01-01 RX ORDER — HYDRALAZINE HYDROCHLORIDE 20 MG/ML
5-10 INJECTION INTRAMUSCULAR; INTRAVENOUS EVERY 6 HOURS PRN
Status: DISCONTINUED | OUTPATIENT
Start: 2024-01-01 | End: 2024-01-01

## 2024-01-01 RX ORDER — AMOXICILLIN 250 MG
2 CAPSULE ORAL 2 TIMES DAILY PRN
Status: CANCELLED | OUTPATIENT
Start: 2024-01-01

## 2024-01-01 RX ORDER — WARFARIN SODIUM 4 MG/1
4 TABLET ORAL
Status: COMPLETED | OUTPATIENT
Start: 2024-01-01 | End: 2024-01-01

## 2024-01-01 RX ORDER — HYDROMORPHONE HYDROCHLORIDE 1 MG/ML
0.2 INJECTION, SOLUTION INTRAMUSCULAR; INTRAVENOUS; SUBCUTANEOUS EVERY 5 MIN PRN
Status: DISCONTINUED | OUTPATIENT
Start: 2024-01-01 | End: 2024-01-01 | Stop reason: HOSPADM

## 2024-01-01 RX ORDER — ALBUTEROL SULFATE 0.83 MG/ML
2.5 SOLUTION RESPIRATORY (INHALATION)
OUTPATIENT
Start: 2024-01-01

## 2024-01-01 RX ORDER — ROSUVASTATIN CALCIUM 10 MG/1
10 TABLET, COATED ORAL DAILY
Qty: 90 TABLET | Refills: 1 | Status: SHIPPED | OUTPATIENT
Start: 2024-01-01

## 2024-01-01 RX ORDER — ONDANSETRON 2 MG/ML
4 INJECTION INTRAMUSCULAR; INTRAVENOUS ONCE
Status: COMPLETED | OUTPATIENT
Start: 2024-01-01 | End: 2024-01-01

## 2024-01-01 RX ORDER — DIPHENHYDRAMINE HYDROCHLORIDE 50 MG/ML
25 INJECTION INTRAMUSCULAR; INTRAVENOUS EVERY 6 HOURS PRN
Status: DISCONTINUED | OUTPATIENT
Start: 2024-01-01 | End: 2024-01-01

## 2024-01-01 RX ORDER — FENTANYL CITRATE 50 UG/ML
INJECTION, SOLUTION INTRAMUSCULAR; INTRAVENOUS PRN
Status: DISCONTINUED | OUTPATIENT
Start: 2024-01-01 | End: 2024-01-01

## 2024-01-01 RX ORDER — GADOBUTROL 604.72 MG/ML
10 INJECTION INTRAVENOUS ONCE
Status: COMPLETED | OUTPATIENT
Start: 2024-01-01 | End: 2024-01-01

## 2024-01-01 RX ORDER — MYCOPHENOLATE MOFETIL 250 MG/1
250 CAPSULE ORAL 2 TIMES DAILY
Status: DISCONTINUED | OUTPATIENT
Start: 2024-01-01 | End: 2024-01-01 | Stop reason: HOSPADM

## 2024-01-01 RX ORDER — BISACODYL 10 MG
10 SUPPOSITORY, RECTAL RECTAL DAILY PRN
Status: DISCONTINUED | OUTPATIENT
Start: 2024-01-01 | End: 2024-01-01 | Stop reason: HOSPADM

## 2024-01-01 RX ORDER — ALBUTEROL SULFATE 90 UG/1
AEROSOL, METERED RESPIRATORY (INHALATION)
Qty: 54 G | Refills: 1 | Status: SHIPPED | OUTPATIENT
Start: 2024-01-01

## 2024-01-01 RX ORDER — HEPARIN SODIUM 1000 [USP'U]/ML
2 INJECTION, SOLUTION INTRAVENOUS; SUBCUTANEOUS ONCE
Status: COMPLETED | OUTPATIENT
Start: 2024-01-01 | End: 2024-01-01

## 2024-01-01 RX ORDER — WARFARIN SODIUM 5 MG/1
5 TABLET ORAL
Status: DISCONTINUED | OUTPATIENT
Start: 2024-01-01 | End: 2024-01-01 | Stop reason: HOSPADM

## 2024-01-01 RX ORDER — LORAZEPAM 0.5 MG/1
0.5 TABLET ORAL
Status: COMPLETED | OUTPATIENT
Start: 2024-01-01 | End: 2024-01-01

## 2024-01-01 RX ORDER — ALBUTEROL SULFATE 90 UG/1
AEROSOL, METERED RESPIRATORY (INHALATION)
Qty: 40.2 G | Refills: 0 | Status: SHIPPED | OUTPATIENT
Start: 2024-01-01 | End: 2024-01-01

## 2024-01-01 RX ORDER — LANOLIN ALCOHOL/MO/W.PET/CERES
1000 CREAM (GRAM) TOPICAL DAILY
Status: CANCELLED | OUTPATIENT
Start: 2024-01-01

## 2024-01-01 RX ORDER — ACETAMINOPHEN 325 MG/1
975 TABLET ORAL 3 TIMES DAILY
Status: DISCONTINUED | OUTPATIENT
Start: 2024-01-01 | End: 2024-01-01

## 2024-01-01 RX ORDER — TACROLIMUS 0.5 MG/1
0.5 CAPSULE ORAL
Status: DISCONTINUED | OUTPATIENT
Start: 2024-01-01 | End: 2024-01-01

## 2024-01-01 RX ORDER — POLYETHYLENE GLYCOL 3350 17 G/17G
17 POWDER, FOR SOLUTION ORAL DAILY
Status: DISCONTINUED | OUTPATIENT
Start: 2024-01-01 | End: 2024-01-01 | Stop reason: HOSPADM

## 2024-01-01 RX ORDER — ALLOPURINOL 300 MG/1
1 TABLET ORAL DAILY
Qty: 100 TABLET | Refills: 2 | Status: SHIPPED | OUTPATIENT
Start: 2024-01-01

## 2024-01-01 RX ORDER — HEPARIN SODIUM,PORCINE 10 UNIT/ML
5-20 VIAL (ML) INTRAVENOUS DAILY PRN
OUTPATIENT
Start: 2024-01-01

## 2024-01-01 RX ORDER — DIAZEPAM 5 MG
5 TABLET ORAL
Status: DISCONTINUED | OUTPATIENT
Start: 2024-01-01 | End: 2024-01-01

## 2024-01-01 RX ORDER — LANOLIN ALCOHOL/MO/W.PET/CERES
3 CREAM (GRAM) TOPICAL
Status: DISCONTINUED | OUTPATIENT
Start: 2024-01-01 | End: 2024-01-01

## 2024-01-01 RX ORDER — PREDNISONE 20 MG/1
40 TABLET ORAL DAILY
Qty: 10 TABLET | Refills: 0 | Status: SHIPPED | OUTPATIENT
Start: 2024-01-01 | End: 2024-01-01

## 2024-01-01 RX ORDER — WARFARIN SODIUM 7.5 MG
3.75 TABLET ORAL
Status: DISCONTINUED | OUTPATIENT
Start: 2024-01-01 | End: 2024-01-01

## 2024-01-01 RX ORDER — ACETAMINOPHEN 325 MG/1
975 TABLET ORAL EVERY 8 HOURS PRN
Qty: 100 TABLET | Refills: 0 | Status: SHIPPED | OUTPATIENT
Start: 2024-01-01

## 2024-01-01 RX ORDER — CEFAZOLIN SODIUM 2 G/100ML
2 INJECTION, SOLUTION INTRAVENOUS
Status: COMPLETED | OUTPATIENT
Start: 2024-01-01 | End: 2024-01-01

## 2024-01-01 RX ORDER — GUAIFENESIN 600 MG/1
600 TABLET, EXTENDED RELEASE ORAL 2 TIMES DAILY
Status: DISCONTINUED | OUTPATIENT
Start: 2024-01-01 | End: 2024-01-01

## 2024-01-01 RX ORDER — ASPIRIN 81 MG/1
81 TABLET, CHEWABLE ORAL DAILY
Status: DISCONTINUED | OUTPATIENT
Start: 2024-01-01 | End: 2024-01-01 | Stop reason: HOSPADM

## 2024-01-01 RX ORDER — TACROLIMUS 0.5 MG/1
0.5 CAPSULE ORAL EVERY MORNING
Qty: 90 CAPSULE | Refills: 3 | Status: SHIPPED | OUTPATIENT
Start: 2024-01-01 | End: 2024-01-01

## 2024-01-01 RX ORDER — BUMETANIDE 1 MG/1
2 TABLET ORAL 2 TIMES DAILY
Qty: 540 TABLET | Refills: 3 | Status: SHIPPED | OUTPATIENT
Start: 2024-01-01

## 2024-01-01 RX ORDER — PREDNISONE 20 MG/1
20 TABLET ORAL DAILY
Qty: 10 TABLET | Refills: 0 | Status: SHIPPED | OUTPATIENT
Start: 2024-01-01 | End: 2024-01-01

## 2024-01-01 RX ORDER — POLYETHYLENE GLYCOL 3350 17 G/17G
17 POWDER, FOR SOLUTION ORAL DAILY PRN
Status: DISCONTINUED | OUTPATIENT
Start: 2024-01-01 | End: 2024-01-01 | Stop reason: DRUGHIGH

## 2024-01-01 RX ORDER — AMOXICILLIN 250 MG
1 CAPSULE ORAL 2 TIMES DAILY PRN
Status: DISCONTINUED | OUTPATIENT
Start: 2024-01-01 | End: 2024-01-01

## 2024-01-01 RX ORDER — MAGNESIUM SULFATE HEPTAHYDRATE 40 MG/ML
2 INJECTION, SOLUTION INTRAVENOUS ONCE
Status: COMPLETED | OUTPATIENT
Start: 2024-01-01 | End: 2024-01-01

## 2024-01-01 RX ORDER — DEXTROSE MONOHYDRATE 25 G/50ML
25-50 INJECTION, SOLUTION INTRAVENOUS
Status: DISCONTINUED | OUTPATIENT
Start: 2024-01-01 | End: 2024-01-01 | Stop reason: HOSPADM

## 2024-01-01 RX ORDER — WARFARIN SODIUM 1 MG/1
TABLET ORAL
Qty: 90 TABLET | Refills: 3 | Status: SHIPPED | OUTPATIENT
Start: 2024-01-01 | End: 2024-01-01

## 2024-01-01 RX ORDER — TACROLIMUS 0.5 MG/1
0.5 CAPSULE ORAL EVERY MORNING
Status: DISCONTINUED | OUTPATIENT
Start: 2024-01-01 | End: 2024-01-01 | Stop reason: DRUGHIGH

## 2024-01-01 RX ORDER — PIPERACILLIN SODIUM, TAZOBACTAM SODIUM 3; .375 G/15ML; G/15ML
3.38 INJECTION, POWDER, LYOPHILIZED, FOR SOLUTION INTRAVENOUS EVERY 6 HOURS
Status: DISCONTINUED | OUTPATIENT
Start: 2024-01-01 | End: 2024-01-01

## 2024-01-01 RX ORDER — TACROLIMUS 0.5 MG/1
0.5 CAPSULE ORAL EVERY MORNING
Qty: 90 CAPSULE | Refills: 3 | Status: SHIPPED | OUTPATIENT
Start: 2024-01-01

## 2024-01-01 RX ORDER — HYDROMORPHONE HCL IN WATER/PF 6 MG/30 ML
0.2 PATIENT CONTROLLED ANALGESIA SYRINGE INTRAVENOUS EVERY 4 HOURS PRN
Status: DISCONTINUED | OUTPATIENT
Start: 2024-01-01 | End: 2024-01-01

## 2024-01-01 RX ORDER — HYDRALAZINE HYDROCHLORIDE 20 MG/ML
10-20 INJECTION INTRAMUSCULAR; INTRAVENOUS EVERY 4 HOURS PRN
Status: DISCONTINUED | OUTPATIENT
Start: 2024-01-01 | End: 2024-01-01

## 2024-01-01 RX ORDER — ONDANSETRON 4 MG/1
4 TABLET, ORALLY DISINTEGRATING ORAL EVERY 6 HOURS PRN
Status: DISCONTINUED | OUTPATIENT
Start: 2024-01-01 | End: 2024-01-01 | Stop reason: HOSPADM

## 2024-01-01 RX ORDER — WARFARIN SODIUM 1 MG/1
TABLET ORAL
Qty: 90 TABLET | Refills: 3 | Status: ACTIVE | OUTPATIENT
Start: 2024-01-01 | End: 2024-01-01

## 2024-01-01 RX ORDER — POTASSIUM CHLORIDE 750 MG/1
40 TABLET, EXTENDED RELEASE ORAL ONCE
Status: COMPLETED | OUTPATIENT
Start: 2024-01-01 | End: 2024-01-01

## 2024-01-01 RX ORDER — DIPHENHYDRAMINE HCL 25 MG
25 CAPSULE ORAL EVERY 6 HOURS PRN
Status: DISCONTINUED | OUTPATIENT
Start: 2024-01-01 | End: 2024-01-01

## 2024-01-01 RX ORDER — MYCOPHENOLATE MOFETIL 250 MG/1
500 CAPSULE ORAL 2 TIMES DAILY
Qty: 180 CAPSULE | Refills: 3 | Status: ACTIVE | OUTPATIENT
Start: 2024-01-01

## 2024-01-01 RX ORDER — POLYETHYLENE GLYCOL 3350 17 G/17G
17 POWDER, FOR SOLUTION ORAL DAILY
Qty: 510 G | Refills: 0 | Status: SHIPPED | OUTPATIENT
Start: 2024-01-01 | End: 2024-01-01

## 2024-01-01 RX ORDER — MONTELUKAST SODIUM 10 MG/1
10 TABLET ORAL AT BEDTIME
Status: DISCONTINUED | OUTPATIENT
Start: 2024-01-01 | End: 2024-01-01 | Stop reason: HOSPADM

## 2024-01-01 RX ORDER — GABAPENTIN 300 MG/1
300 CAPSULE ORAL 2 TIMES DAILY
Status: DISCONTINUED | OUTPATIENT
Start: 2024-01-01 | End: 2024-01-01 | Stop reason: HOSPADM

## 2024-01-01 RX ORDER — CALCITRIOL 0.25 UG/1
0.25 CAPSULE, LIQUID FILLED ORAL DAILY
Status: DISCONTINUED | OUTPATIENT
Start: 2024-01-01 | End: 2024-01-01 | Stop reason: HOSPADM

## 2024-01-01 RX ORDER — WARFARIN SODIUM 3 MG/1
6 TABLET ORAL
Status: COMPLETED | OUTPATIENT
Start: 2024-01-01 | End: 2024-01-01

## 2024-01-01 RX ORDER — FAMOTIDINE 20 MG
25 TABLET ORAL DAILY
Qty: 90 CAPSULE | Refills: 3 | Status: SHIPPED | OUTPATIENT
Start: 2024-01-01 | End: 2024-01-01

## 2024-01-01 RX ORDER — BUMETANIDE 1 MG/1
3 TABLET ORAL 2 TIMES DAILY
Status: SHIPPED
Start: 2024-01-01 | End: 2024-01-01

## 2024-01-01 RX ORDER — LEVALBUTEROL TARTRATE 45 UG/1
2 AEROSOL, METERED ORAL EVERY 4 HOURS
Status: DISCONTINUED | OUTPATIENT
Start: 2024-01-01 | End: 2024-01-01

## 2024-01-01 RX ORDER — SENNOSIDES 8.6 MG
1 TABLET ORAL DAILY PRN
Qty: 30 TABLET | Refills: 0 | Status: SHIPPED | OUTPATIENT
Start: 2024-01-01 | End: 2024-01-01

## 2024-01-01 RX ORDER — HEPARIN SODIUM 10000 [USP'U]/100ML
0-5000 INJECTION, SOLUTION INTRAVENOUS CONTINUOUS
Status: DISCONTINUED | OUTPATIENT
Start: 2024-01-01 | End: 2024-01-01

## 2024-01-01 RX ORDER — MONTELUKAST SODIUM 10 MG/1
10 TABLET ORAL AT BEDTIME
Status: CANCELLED | OUTPATIENT
Start: 2024-01-01

## 2024-01-01 RX ORDER — CALCIUM CARBONATE 500 MG/1
1000 TABLET, CHEWABLE ORAL 4 TIMES DAILY PRN
Status: DISCONTINUED | OUTPATIENT
Start: 2024-01-01 | End: 2024-01-01

## 2024-01-01 RX ORDER — IPRATROPIUM BROMIDE AND ALBUTEROL SULFATE 2.5; .5 MG/3ML; MG/3ML
3 SOLUTION RESPIRATORY (INHALATION) ONCE
Status: COMPLETED | OUTPATIENT
Start: 2024-01-01 | End: 2024-01-01

## 2024-01-01 RX ORDER — SODIUM CHLORIDE, SODIUM LACTATE, POTASSIUM CHLORIDE, CALCIUM CHLORIDE 600; 310; 30; 20 MG/100ML; MG/100ML; MG/100ML; MG/100ML
INJECTION, SOLUTION INTRAVENOUS CONTINUOUS PRN
Status: DISCONTINUED | OUTPATIENT
Start: 2024-01-01 | End: 2024-01-01

## 2024-01-01 RX ORDER — MEPERIDINE HYDROCHLORIDE 25 MG/ML
25 INJECTION INTRAMUSCULAR; INTRAVENOUS; SUBCUTANEOUS EVERY 30 MIN PRN
OUTPATIENT
Start: 2024-01-01

## 2024-01-01 RX ORDER — HYDROMORPHONE HCL IN WATER/PF 6 MG/30 ML
.2-.4 PATIENT CONTROLLED ANALGESIA SYRINGE INTRAVENOUS
Status: DISCONTINUED | OUTPATIENT
Start: 2024-01-01 | End: 2024-01-01

## 2024-01-01 RX ORDER — NITROGLYCERIN 0.4 MG/1
0.4 TABLET SUBLINGUAL EVERY 5 MIN PRN
Status: DISCONTINUED | OUTPATIENT
Start: 2024-01-01 | End: 2024-01-01 | Stop reason: HOSPADM

## 2024-01-01 RX ORDER — CLINDAMYCIN PHOSPHATE 900 MG/50ML
900 INJECTION, SOLUTION INTRAVENOUS
Status: CANCELLED | OUTPATIENT
Start: 2024-01-01

## 2024-01-01 RX ORDER — POTASSIUM CHLORIDE 1.5 G/1.58G
20 POWDER, FOR SOLUTION ORAL ONCE
Status: COMPLETED | OUTPATIENT
Start: 2024-01-01 | End: 2024-01-01

## 2024-01-01 RX ORDER — TACROLIMUS 1 MG/1
3 CAPSULE ORAL
Status: DISCONTINUED | OUTPATIENT
Start: 2024-01-01 | End: 2024-01-01

## 2024-01-01 RX ORDER — GABAPENTIN 300 MG/1
300 CAPSULE ORAL 2 TIMES DAILY
Qty: 200 CAPSULE | Refills: 2 | OUTPATIENT
Start: 2024-01-01

## 2024-01-01 RX ORDER — FUROSEMIDE 10 MG/ML
40 INJECTION INTRAMUSCULAR; INTRAVENOUS ONCE
Status: COMPLETED | OUTPATIENT
Start: 2024-01-01 | End: 2024-01-01

## 2024-01-01 RX ORDER — CEFEPIME HYDROCHLORIDE 2 G/1
2 INJECTION, POWDER, FOR SOLUTION INTRAVENOUS EVERY 24 HOURS
Status: DISCONTINUED | OUTPATIENT
Start: 2024-01-01 | End: 2024-01-01

## 2024-01-01 RX ORDER — METHYLPREDNISOLONE SODIUM SUCCINATE 125 MG/2ML
125 INJECTION, POWDER, LYOPHILIZED, FOR SOLUTION INTRAMUSCULAR; INTRAVENOUS
Status: ACTIVE | OUTPATIENT
Start: 2024-01-01 | End: 2024-01-01

## 2024-01-01 RX ORDER — CEFEPIME HYDROCHLORIDE 2 G/1
2 INJECTION, POWDER, FOR SOLUTION INTRAVENOUS EVERY 12 HOURS
Status: DISCONTINUED | OUTPATIENT
Start: 2024-01-01 | End: 2024-01-01 | Stop reason: HOSPADM

## 2024-01-01 RX ORDER — MYCOPHENOLATE MOFETIL 250 MG/1
250 CAPSULE ORAL 2 TIMES DAILY
Status: DISCONTINUED | OUTPATIENT
Start: 2024-01-01 | End: 2024-01-01

## 2024-01-01 RX ORDER — MYCOPHENOLATE MOFETIL 250 MG/1
500 CAPSULE ORAL 2 TIMES DAILY
Status: DISCONTINUED | OUTPATIENT
Start: 2024-01-01 | End: 2024-01-01 | Stop reason: HOSPADM

## 2024-01-01 RX ORDER — WARFARIN SODIUM 2.5 MG/1
2.5 TABLET ORAL
Status: CANCELLED | OUTPATIENT
Start: 2024-01-01

## 2024-01-01 RX ORDER — HYDROXYZINE HYDROCHLORIDE 25 MG/1
25 TABLET, FILM COATED ORAL
Status: DISCONTINUED | OUTPATIENT
Start: 2024-01-01 | End: 2024-01-01 | Stop reason: HOSPADM

## 2024-01-01 RX ORDER — CIPROFLOXACIN 500 MG/1
500 TABLET, FILM COATED ORAL DAILY
Status: DISCONTINUED | OUTPATIENT
Start: 2024-01-01 | End: 2024-01-01

## 2024-01-01 RX ORDER — ALBUTEROL SULFATE 90 UG/1
1 AEROSOL, METERED RESPIRATORY (INHALATION) EVERY 4 HOURS PRN
Status: DISCONTINUED | OUTPATIENT
Start: 2024-01-01 | End: 2024-01-01 | Stop reason: HOSPADM

## 2024-01-01 RX ORDER — TRAMADOL HYDROCHLORIDE 50 MG/1
50 TABLET ORAL EVERY 6 HOURS PRN
Status: DISCONTINUED | OUTPATIENT
Start: 2024-01-01 | End: 2024-01-01 | Stop reason: HOSPADM

## 2024-01-01 RX ORDER — BUMETANIDE 1 MG/1
2 TABLET ORAL DAILY
Qty: 180 TABLET | Refills: 3 | Status: SHIPPED | OUTPATIENT
Start: 2024-01-01 | End: 2024-01-01

## 2024-01-01 RX ORDER — FUROSEMIDE 10 MG/ML
20 INJECTION INTRAMUSCULAR; INTRAVENOUS ONCE
Status: COMPLETED | OUTPATIENT
Start: 2024-01-01 | End: 2024-01-01

## 2024-01-01 RX ORDER — BUPROPION HYDROCHLORIDE 150 MG/1
300 TABLET ORAL EVERY MORNING
Status: CANCELLED | OUTPATIENT
Start: 2024-01-01

## 2024-01-01 RX ORDER — HYDROMORPHONE HCL IN WATER/PF 6 MG/30 ML
0.2 PATIENT CONTROLLED ANALGESIA SYRINGE INTRAVENOUS
Status: DISCONTINUED | OUTPATIENT
Start: 2024-01-01 | End: 2024-01-01

## 2024-01-01 RX ORDER — CALCIUM GLUCONATE 20 MG/ML
1 INJECTION, SOLUTION INTRAVENOUS ONCE
Status: COMPLETED | OUTPATIENT
Start: 2024-01-01 | End: 2024-01-01

## 2024-01-01 RX ORDER — DIPHENHYDRAMINE HCL 25 MG
25 CAPSULE ORAL ONCE
Status: COMPLETED | OUTPATIENT
Start: 2024-01-01 | End: 2024-01-01

## 2024-01-01 RX ORDER — HEPARIN SODIUM (PORCINE) LOCK FLUSH IV SOLN 100 UNIT/ML 100 UNIT/ML
5 SOLUTION INTRAVENOUS
OUTPATIENT
Start: 2024-01-01

## 2024-01-01 RX ORDER — METHOCARBAMOL 500 MG/1
500 TABLET, FILM COATED ORAL 4 TIMES DAILY PRN
Qty: 40 TABLET | Refills: 0 | Status: SHIPPED | OUTPATIENT
Start: 2024-01-01 | End: 2024-01-01

## 2024-01-01 RX ORDER — VITAMIN B COMPLEX
25 TABLET ORAL DAILY
Status: DISCONTINUED | OUTPATIENT
Start: 2024-01-01 | End: 2024-01-01 | Stop reason: HOSPADM

## 2024-01-01 RX ORDER — GUAIFENESIN 600 MG/1
1200 TABLET, EXTENDED RELEASE ORAL 2 TIMES DAILY
Status: DISCONTINUED | OUTPATIENT
Start: 2024-01-01 | End: 2024-01-01 | Stop reason: HOSPADM

## 2024-01-01 RX ORDER — LANOLIN ALCOHOL/MO/W.PET/CERES
1000 CREAM (GRAM) TOPICAL DAILY
Qty: 90 TABLET | Refills: 3 | Status: SHIPPED | OUTPATIENT
Start: 2024-01-01

## 2024-01-01 RX ORDER — POTASSIUM CHLORIDE 1.5 G/1.58G
40 POWDER, FOR SOLUTION ORAL ONCE
Status: COMPLETED | OUTPATIENT
Start: 2024-01-01 | End: 2024-01-01

## 2024-01-01 RX ORDER — WARFARIN SODIUM 2.5 MG/1
2.5 TABLET ORAL
Status: DISCONTINUED | OUTPATIENT
Start: 2024-01-01 | End: 2024-01-01 | Stop reason: HOSPADM

## 2024-01-01 RX ORDER — ACETAMINOPHEN 650 MG/1
650 SUPPOSITORY RECTAL EVERY 4 HOURS PRN
Status: DISCONTINUED | OUTPATIENT
Start: 2024-01-01 | End: 2024-01-01

## 2024-01-01 RX ORDER — FLUMAZENIL 0.1 MG/ML
0.2 INJECTION, SOLUTION INTRAVENOUS
Status: DISCONTINUED | OUTPATIENT
Start: 2024-01-01 | End: 2024-01-01 | Stop reason: HOSPADM

## 2024-01-01 RX ORDER — BISACODYL 5 MG/1
TABLET, DELAYED RELEASE ORAL
Qty: 4 TABLET | Refills: 0 | Status: SHIPPED | OUTPATIENT
Start: 2024-01-01 | End: 2024-01-01

## 2024-01-01 RX ORDER — PREDNISONE 20 MG/1
40 TABLET ORAL DAILY
Qty: 4 TABLET | Refills: 0 | Status: SHIPPED | OUTPATIENT
Start: 2024-01-01 | End: 2024-01-01

## 2024-01-01 RX ORDER — TACROLIMUS 1 MG/1
CAPSULE ORAL
Qty: 84 CAPSULE | Refills: 3 | Status: SHIPPED | OUTPATIENT
Start: 2024-01-01 | End: 2024-01-01

## 2024-01-01 RX ORDER — ACETAMINOPHEN 325 MG/1
975 TABLET ORAL EVERY 8 HOURS PRN
Status: DISCONTINUED | OUTPATIENT
Start: 2024-01-01 | End: 2024-01-01 | Stop reason: HOSPADM

## 2024-01-01 RX ORDER — HYDROXYZINE HYDROCHLORIDE 25 MG/1
25 TABLET, FILM COATED ORAL
Status: COMPLETED | OUTPATIENT
Start: 2024-01-01 | End: 2024-01-01

## 2024-01-01 RX ORDER — ROSUVASTATIN CALCIUM 10 MG/1
10 TABLET, COATED ORAL DAILY
Status: CANCELLED | OUTPATIENT
Start: 2024-01-01

## 2024-01-01 RX ORDER — BUPROPION HYDROCHLORIDE 75 MG/1
75 TABLET ORAL 2 TIMES DAILY
Status: DISCONTINUED | OUTPATIENT
Start: 2024-01-01 | End: 2024-01-01 | Stop reason: HOSPADM

## 2024-01-01 RX ORDER — ROSUVASTATIN CALCIUM 10 MG/1
10 TABLET, COATED ORAL DAILY
Qty: 90 TABLET | Refills: 0 | Status: SHIPPED | OUTPATIENT
Start: 2024-01-01 | End: 2024-01-01

## 2024-01-01 RX ORDER — TACROLIMUS 1 MG/1
3 CAPSULE ORAL
Status: DISCONTINUED | OUTPATIENT
Start: 2024-01-01 | End: 2024-01-01 | Stop reason: HOSPADM

## 2024-01-01 RX ORDER — PREDNISONE 20 MG/1
40 TABLET ORAL DAILY
Status: DISCONTINUED | OUTPATIENT
Start: 2024-01-01 | End: 2024-01-01 | Stop reason: HOSPADM

## 2024-01-01 RX ORDER — FENTANYL CITRATE 50 UG/ML
25-50 INJECTION, SOLUTION INTRAMUSCULAR; INTRAVENOUS EVERY 5 MIN PRN
Status: DISCONTINUED | OUTPATIENT
Start: 2024-01-01 | End: 2024-01-01 | Stop reason: HOSPADM

## 2024-01-01 RX ORDER — ALBUTEROL SULFATE 90 UG/1
2 AEROSOL, METERED RESPIRATORY (INHALATION) EVERY 4 HOURS PRN
Status: DISCONTINUED | OUTPATIENT
Start: 2024-01-01 | End: 2024-01-01

## 2024-01-01 RX ORDER — BUPROPION HYDROCHLORIDE 300 MG/1
300 TABLET ORAL EVERY MORNING
Qty: 90 TABLET | Refills: 1 | Status: SHIPPED | OUTPATIENT
Start: 2024-01-01 | End: 2024-01-01

## 2024-01-01 RX ORDER — DEXTROSE MONOHYDRATE 100 MG/ML
INJECTION, SOLUTION INTRAVENOUS CONTINUOUS PRN
Status: DISCONTINUED | OUTPATIENT
Start: 2024-01-01 | End: 2024-01-01 | Stop reason: HOSPADM

## 2024-01-01 RX ORDER — AMOXICILLIN 250 MG
1 CAPSULE ORAL 2 TIMES DAILY PRN
Status: DISCONTINUED | OUTPATIENT
Start: 2024-01-01 | End: 2024-01-01 | Stop reason: HOSPADM

## 2024-01-01 RX ORDER — TACROLIMUS 1 MG/1
3 CAPSULE ORAL 2 TIMES DAILY
Status: DISCONTINUED | OUTPATIENT
Start: 2024-01-01 | End: 2024-01-01 | Stop reason: DRUGHIGH

## 2024-01-01 RX ORDER — OXYCODONE HYDROCHLORIDE 5 MG/1
5 TABLET ORAL ONCE
Status: COMPLETED | OUTPATIENT
Start: 2024-01-01 | End: 2024-01-01

## 2024-01-01 RX ORDER — SODIUM CHLORIDE 9 MG/ML
INJECTION, SOLUTION INTRAVENOUS CONTINUOUS
Status: DISCONTINUED | OUTPATIENT
Start: 2024-01-01 | End: 2024-01-01 | Stop reason: HOSPADM

## 2024-01-01 RX ORDER — METHOCARBAMOL 500 MG/1
500 TABLET, FILM COATED ORAL 4 TIMES DAILY
Status: DISCONTINUED | OUTPATIENT
Start: 2024-01-01 | End: 2024-01-01 | Stop reason: HOSPADM

## 2024-01-01 RX ORDER — CEFEPIME HYDROCHLORIDE 1 G/1
1 INJECTION, POWDER, FOR SOLUTION INTRAMUSCULAR; INTRAVENOUS EVERY 24 HOURS
Status: DISCONTINUED | OUTPATIENT
Start: 2024-01-01 | End: 2024-01-01 | Stop reason: HOSPADM

## 2024-01-01 RX ORDER — TACROLIMUS 1 MG/1
CAPSULE ORAL
COMMUNITY
Start: 2024-01-01 | End: 2024-01-01

## 2024-01-01 RX ORDER — WARFARIN SODIUM 7.5 MG/1
7.5 TABLET ORAL
Status: COMPLETED | OUTPATIENT
Start: 2024-01-01 | End: 2024-01-01

## 2024-01-01 RX ORDER — IPRATROPIUM BROMIDE AND ALBUTEROL SULFATE 2.5; .5 MG/3ML; MG/3ML
1 SOLUTION RESPIRATORY (INHALATION) 4 TIMES DAILY PRN
Status: DISCONTINUED | OUTPATIENT
Start: 2024-01-01 | End: 2024-01-01 | Stop reason: HOSPADM

## 2024-01-01 RX ORDER — EPINEPHRINE 1 MG/ML
0.3 INJECTION, SOLUTION, CONCENTRATE INTRAVENOUS EVERY 5 MIN PRN
OUTPATIENT
Start: 2024-01-01

## 2024-01-01 RX ORDER — SODIUM CHLORIDE, SODIUM LACTATE, POTASSIUM CHLORIDE, CALCIUM CHLORIDE 600; 310; 30; 20 MG/100ML; MG/100ML; MG/100ML; MG/100ML
INJECTION, SOLUTION INTRAVENOUS CONTINUOUS
Status: DISCONTINUED | OUTPATIENT
Start: 2024-01-01 | End: 2024-01-01 | Stop reason: HOSPADM

## 2024-01-01 RX ORDER — HEPARIN SODIUM 5000 [USP'U]/.5ML
5000 INJECTION, SOLUTION INTRAVENOUS; SUBCUTANEOUS EVERY 8 HOURS
Status: DISCONTINUED | OUTPATIENT
Start: 2024-01-01 | End: 2024-01-01 | Stop reason: HOSPADM

## 2024-01-01 RX ORDER — SEVELAMER HYDROCHLORIDE 400 MG/1
400 TABLET, FILM COATED ORAL
Status: DISCONTINUED | OUTPATIENT
Start: 2024-01-01 | End: 2024-01-01 | Stop reason: HOSPADM

## 2024-01-01 RX ORDER — LANOLIN ALCOHOL/MO/W.PET/CERES
1000 CREAM (GRAM) TOPICAL DAILY
COMMUNITY
End: 2024-01-01

## 2024-01-01 RX ORDER — LABETALOL HYDROCHLORIDE 5 MG/ML
10 INJECTION, SOLUTION INTRAVENOUS
Status: DISCONTINUED | OUTPATIENT
Start: 2024-01-01 | End: 2024-01-01 | Stop reason: HOSPADM

## 2024-01-01 RX ORDER — CEFDINIR 300 MG/1
300 CAPSULE ORAL DAILY
Qty: 4 CAPSULE | Refills: 0 | Status: ACTIVE | OUTPATIENT
Start: 2024-01-01 | End: 2024-01-01

## 2024-01-01 RX ORDER — ONDANSETRON 4 MG/1
4 TABLET, ORALLY DISINTEGRATING ORAL EVERY 30 MIN PRN
Status: DISCONTINUED | OUTPATIENT
Start: 2024-01-01 | End: 2024-01-01 | Stop reason: HOSPADM

## 2024-01-01 RX ORDER — POLYETHYLENE GLYCOL 3350 17 G/17G
17 POWDER, FOR SOLUTION ORAL 2 TIMES DAILY PRN
Status: CANCELLED | OUTPATIENT
Start: 2024-01-01

## 2024-01-01 RX ORDER — ACETAMINOPHEN 325 MG/1
650 TABLET ORAL EVERY 4 HOURS PRN
Status: DISCONTINUED | OUTPATIENT
Start: 2024-01-01 | End: 2024-01-01 | Stop reason: HOSPADM

## 2024-01-01 RX ORDER — FENTANYL CITRATE 50 UG/ML
INJECTION, SOLUTION INTRAMUSCULAR; INTRAVENOUS PRN
Status: DISCONTINUED | OUTPATIENT
Start: 2024-01-01 | End: 2024-01-01 | Stop reason: HOSPADM

## 2024-01-01 RX ORDER — ALBUTEROL SULFATE 90 UG/1
2 AEROSOL, METERED RESPIRATORY (INHALATION) EVERY 4 HOURS PRN
Status: CANCELLED | OUTPATIENT
Start: 2024-01-01

## 2024-01-01 RX ORDER — TRAMADOL HYDROCHLORIDE 50 MG/1
TABLET ORAL
COMMUNITY
Start: 2024-01-01 | End: 2024-01-01

## 2024-01-01 RX ORDER — WARFARIN SODIUM 2.5 MG/1
TABLET ORAL
Qty: 30 TABLET | Refills: 3 | Status: ACTIVE | OUTPATIENT
Start: 2024-01-01 | End: 2024-01-01

## 2024-01-01 RX ORDER — ASPIRIN 81 MG/1
81 TABLET, CHEWABLE ORAL DAILY
Status: CANCELLED | OUTPATIENT
Start: 2024-01-01

## 2024-01-01 RX ORDER — BUMETANIDE 2 MG/1
2 TABLET ORAL DAILY
Status: DISCONTINUED | OUTPATIENT
Start: 2024-01-01 | End: 2024-01-01

## 2024-01-01 RX ORDER — CALCITRIOL 0.25 UG/1
0.25 CAPSULE, LIQUID FILLED ORAL DAILY
Qty: 90 CAPSULE | Refills: 3 | Status: SHIPPED | OUTPATIENT
Start: 2024-01-01 | End: 2024-01-01

## 2024-01-01 RX ORDER — ACETAMINOPHEN 325 MG/1
650 TABLET ORAL EVERY 4 HOURS PRN
Status: DISCONTINUED | OUTPATIENT
Start: 2024-01-01 | End: 2024-01-01

## 2024-01-01 RX ORDER — BUPROPION HYDROCHLORIDE 300 MG/1
300 TABLET ORAL EVERY MORNING
Status: DISCONTINUED | OUTPATIENT
Start: 2024-01-01 | End: 2024-01-01 | Stop reason: HOSPADM

## 2024-01-01 RX ORDER — ONDANSETRON 2 MG/ML
4 INJECTION INTRAMUSCULAR; INTRAVENOUS EVERY 30 MIN PRN
Status: DISCONTINUED | OUTPATIENT
Start: 2024-01-01 | End: 2024-01-01 | Stop reason: HOSPADM

## 2024-01-01 RX ORDER — FUROSEMIDE 10 MG/ML
40 INJECTION INTRAMUSCULAR; INTRAVENOUS ONCE
Qty: 4 ML | Refills: 0 | Status: COMPLETED | OUTPATIENT
Start: 2024-01-01 | End: 2024-01-01

## 2024-01-01 RX ORDER — POLYETHYLENE GLYCOL 3350 17 G/17G
1 POWDER, FOR SOLUTION ORAL DAILY PRN
COMMUNITY

## 2024-01-01 RX ORDER — ALBUTEROL SULFATE 90 UG/1
1-2 AEROSOL, METERED RESPIRATORY (INHALATION)
Start: 2024-01-01

## 2024-01-01 RX ORDER — ACETAMINOPHEN 325 MG/1
975 TABLET ORAL 3 TIMES DAILY
Status: DISCONTINUED | OUTPATIENT
Start: 2024-01-01 | End: 2024-01-01 | Stop reason: HOSPADM

## 2024-01-01 RX ORDER — BUMETANIDE 1 MG/1
1 TABLET ORAL DAILY
Qty: 30 TABLET | Refills: 3 | Status: SHIPPED | OUTPATIENT
Start: 2024-01-01 | End: 2024-01-01

## 2024-01-01 RX ORDER — TACROLIMUS 1 MG/1
CAPSULE ORAL
Qty: 180 CAPSULE | Refills: 3 | Status: SHIPPED | OUTPATIENT
Start: 2024-01-01

## 2024-01-01 RX ORDER — DIPHENHYDRAMINE HYDROCHLORIDE 50 MG/ML
50 INJECTION INTRAMUSCULAR; INTRAVENOUS
Status: COMPLETED | OUTPATIENT
Start: 2024-01-01 | End: 2024-01-01

## 2024-01-01 RX ORDER — HYDROMORPHONE HCL IN WATER/PF 6 MG/30 ML
.2-.4 PATIENT CONTROLLED ANALGESIA SYRINGE INTRAVENOUS EVERY 4 HOURS PRN
Status: DISCONTINUED | OUTPATIENT
Start: 2024-01-01 | End: 2024-01-01

## 2024-01-01 RX ORDER — CALCIUM CARBONATE 500 MG/1
1000 TABLET, CHEWABLE ORAL DAILY
Status: DISCONTINUED | OUTPATIENT
Start: 2024-01-01 | End: 2024-01-01

## 2024-01-01 RX ORDER — ALBUTEROL SULFATE 0.83 MG/ML
2.5 SOLUTION RESPIRATORY (INHALATION)
Status: DISCONTINUED | OUTPATIENT
Start: 2024-01-01 | End: 2024-01-01

## 2024-01-01 RX ORDER — PROCHLORPERAZINE MALEATE 5 MG
5 TABLET ORAL EVERY 6 HOURS PRN
Status: DISCONTINUED | OUTPATIENT
Start: 2024-01-01 | End: 2024-01-01 | Stop reason: HOSPADM

## 2024-01-01 RX ORDER — HEPARIN SODIUM 5000 [USP'U]/.5ML
5000 INJECTION, SOLUTION INTRAVENOUS; SUBCUTANEOUS
Status: CANCELLED | OUTPATIENT
Start: 2024-01-01

## 2024-01-01 RX ORDER — BUPROPION HYDROCHLORIDE 300 MG/1
300 TABLET ORAL EVERY MORNING
Qty: 90 TABLET | Refills: 3 | Status: SHIPPED | OUTPATIENT
Start: 2024-01-01

## 2024-01-01 RX ORDER — TACROLIMUS 1 MG/1
3 CAPSULE ORAL 2 TIMES DAILY
Status: DISCONTINUED | OUTPATIENT
Start: 2024-01-01 | End: 2024-01-01

## 2024-01-01 RX ORDER — AMOXICILLIN 250 MG
1 CAPSULE ORAL 2 TIMES DAILY PRN
Status: CANCELLED | OUTPATIENT
Start: 2024-01-01

## 2024-01-01 RX ORDER — MONTELUKAST SODIUM 10 MG/1
TABLET ORAL
Qty: 100 TABLET | Refills: 3 | Status: SHIPPED | OUTPATIENT
Start: 2024-01-01

## 2024-01-01 RX ORDER — MYCOPHENOLATE MOFETIL 250 MG/1
250 CAPSULE ORAL 2 TIMES DAILY
Status: CANCELLED | OUTPATIENT
Start: 2024-01-01

## 2024-01-01 RX ORDER — CALCIUM ACETATE 667 MG/1
667 TABLET ORAL
Qty: 270 TABLET | Refills: 0 | Status: SHIPPED | OUTPATIENT
Start: 2024-01-01 | End: 2024-01-01

## 2024-01-01 RX ORDER — TRAMADOL HYDROCHLORIDE 50 MG/1
50 TABLET ORAL EVERY 6 HOURS PRN
Status: DISCONTINUED | OUTPATIENT
Start: 2024-01-01 | End: 2024-01-01

## 2024-01-01 RX ORDER — HYDROMORPHONE HYDROCHLORIDE 1 MG/ML
0.5 INJECTION, SOLUTION INTRAMUSCULAR; INTRAVENOUS; SUBCUTANEOUS ONCE
Status: COMPLETED | OUTPATIENT
Start: 2024-01-01 | End: 2024-01-01

## 2024-01-01 RX ORDER — FERROUS SULFATE 325(65) MG
325 TABLET ORAL 2 TIMES DAILY WITH MEALS
Status: CANCELLED | OUTPATIENT
Start: 2024-01-01

## 2024-01-01 RX ORDER — ALBUTEROL SULFATE 90 UG/1
2 AEROSOL, METERED RESPIRATORY (INHALATION) EVERY 4 HOURS
Status: DISCONTINUED | OUTPATIENT
Start: 2024-01-01 | End: 2024-01-01

## 2024-01-01 RX ORDER — CLINDAMYCIN PHOSPHATE 900 MG/50ML
900 INJECTION, SOLUTION INTRAVENOUS SEE ADMIN INSTRUCTIONS
Status: CANCELLED | OUTPATIENT
Start: 2024-01-01

## 2024-01-01 RX ORDER — SENNOSIDES 8.6 MG
8.6 TABLET ORAL DAILY
Status: DISCONTINUED | OUTPATIENT
Start: 2024-01-01 | End: 2024-01-01 | Stop reason: HOSPADM

## 2024-01-01 RX ORDER — IPRATROPIUM BROMIDE AND ALBUTEROL SULFATE 2.5; .5 MG/3ML; MG/3ML
1 SOLUTION RESPIRATORY (INHALATION) 4 TIMES DAILY PRN
COMMUNITY

## 2024-01-01 RX ORDER — TAMSULOSIN HYDROCHLORIDE 0.4 MG/1
0.4 CAPSULE ORAL DAILY
Status: DISCONTINUED | OUTPATIENT
Start: 2024-01-01 | End: 2024-01-01

## 2024-01-01 RX ORDER — CALCIUM CARBONATE 500 MG/1
1000 TABLET, CHEWABLE ORAL 2 TIMES DAILY WITH MEALS
Status: DISCONTINUED | OUTPATIENT
Start: 2024-01-01 | End: 2024-01-01 | Stop reason: HOSPADM

## 2024-01-01 RX ORDER — MONTELUKAST SODIUM 10 MG/1
10 TABLET ORAL AT BEDTIME
Status: DISCONTINUED | OUTPATIENT
Start: 2024-01-01 | End: 2024-01-01

## 2024-01-01 RX ORDER — ACETAMINOPHEN 325 MG/1
650 TABLET ORAL EVERY 4 HOURS PRN
Status: CANCELLED | OUTPATIENT
Start: 2024-01-01

## 2024-01-01 RX ORDER — ROSUVASTATIN CALCIUM 10 MG/1
10 TABLET, COATED ORAL DAILY
Status: DISCONTINUED | OUTPATIENT
Start: 2024-01-01 | End: 2024-01-01

## 2024-01-01 RX ORDER — LIDOCAINE 40 MG/G
CREAM TOPICAL
Status: CANCELLED | OUTPATIENT
Start: 2024-01-01

## 2024-01-01 RX ORDER — IPRATROPIUM BROMIDE AND ALBUTEROL SULFATE 2.5; .5 MG/3ML; MG/3ML
3 SOLUTION RESPIRATORY (INHALATION) EVERY 4 HOURS PRN
Status: CANCELLED | OUTPATIENT
Start: 2024-01-01

## 2024-01-01 RX ORDER — METHOCARBAMOL 500 MG/1
500 TABLET, FILM COATED ORAL 3 TIMES DAILY PRN
Status: DISCONTINUED | OUTPATIENT
Start: 2024-01-01 | End: 2024-01-01

## 2024-01-01 RX ORDER — AMOXICILLIN 250 MG
2 CAPSULE ORAL 2 TIMES DAILY PRN
Status: DISCONTINUED | OUTPATIENT
Start: 2024-01-01 | End: 2024-01-01

## 2024-01-01 RX ORDER — HEPARIN SODIUM 5000 [USP'U]/.5ML
5000 INJECTION, SOLUTION INTRAVENOUS; SUBCUTANEOUS EVERY 8 HOURS
Status: DISCONTINUED | OUTPATIENT
Start: 2024-01-01 | End: 2024-01-01

## 2024-01-01 RX ORDER — ACETAMINOPHEN 650 MG/1
650 SUPPOSITORY RECTAL EVERY 4 HOURS PRN
Status: DISCONTINUED | OUTPATIENT
Start: 2024-01-01 | End: 2024-01-01 | Stop reason: HOSPADM

## 2024-01-01 RX ORDER — HYDRALAZINE HYDROCHLORIDE 20 MG/ML
2.5-5 INJECTION INTRAMUSCULAR; INTRAVENOUS EVERY 10 MIN PRN
Status: DISCONTINUED | OUTPATIENT
Start: 2024-01-01 | End: 2024-01-01 | Stop reason: HOSPADM

## 2024-01-01 RX ORDER — BUMETANIDE 0.25 MG/ML
4 INJECTION INTRAMUSCULAR; INTRAVENOUS ONCE
Status: COMPLETED | OUTPATIENT
Start: 2024-01-01 | End: 2024-01-01

## 2024-01-01 RX ORDER — HYDRALAZINE HYDROCHLORIDE 20 MG/ML
5-10 INJECTION INTRAMUSCULAR; INTRAVENOUS EVERY 4 HOURS PRN
Status: DISCONTINUED | OUTPATIENT
Start: 2024-01-01 | End: 2024-01-01

## 2024-01-01 RX ORDER — POLYETHYLENE GLYCOL 3350 17 G/17G
17 POWDER, FOR SOLUTION ORAL 2 TIMES DAILY PRN
Status: DISCONTINUED | OUTPATIENT
Start: 2024-01-01 | End: 2024-01-01 | Stop reason: HOSPADM

## 2024-01-01 RX ORDER — METHOCARBAMOL 500 MG/1
500 TABLET, FILM COATED ORAL 4 TIMES DAILY PRN
Status: CANCELLED | OUTPATIENT
Start: 2024-01-01

## 2024-01-01 RX ORDER — CALCIUM CARBONATE 500 MG/1
2 TABLET, CHEWABLE ORAL
Qty: 300 TABLET | Refills: 3 | Status: ON HOLD | OUTPATIENT
Start: 2024-01-01 | End: 2024-01-01

## 2024-01-01 RX ORDER — DIPHENHYDRAMINE HCL 25 MG
25 TABLET ORAL EVERY 6 HOURS PRN
Status: CANCELLED | OUTPATIENT
Start: 2024-01-01

## 2024-01-01 RX ORDER — MYCOPHENOLATE MOFETIL 250 MG/1
500 CAPSULE ORAL 2 TIMES DAILY
Status: CANCELLED | OUTPATIENT
Start: 2024-01-01

## 2024-01-01 RX ORDER — LEVOFLOXACIN 750 MG/1
750 TABLET, FILM COATED ORAL DAILY
Qty: 6 TABLET | Refills: 0 | Status: SHIPPED | OUTPATIENT
Start: 2024-01-01 | End: 2024-01-01

## 2024-01-01 RX ORDER — IPRATROPIUM BROMIDE AND ALBUTEROL SULFATE 2.5; .5 MG/3ML; MG/3ML
1 SOLUTION RESPIRATORY (INHALATION) 4 TIMES DAILY PRN
Status: CANCELLED | OUTPATIENT
Start: 2024-01-01

## 2024-01-01 RX ORDER — HYDRALAZINE HYDROCHLORIDE 25 MG/1
25 TABLET, FILM COATED ORAL 4 TIMES DAILY
Status: DISCONTINUED | OUTPATIENT
Start: 2024-01-01 | End: 2024-01-01 | Stop reason: HOSPADM

## 2024-01-01 RX ORDER — IPRATROPIUM BROMIDE AND ALBUTEROL SULFATE 2.5; .5 MG/3ML; MG/3ML
1 SOLUTION RESPIRATORY (INHALATION) EVERY 4 HOURS PRN
Status: DISCONTINUED | OUTPATIENT
Start: 2024-01-01 | End: 2024-01-01 | Stop reason: HOSPADM

## 2024-01-01 RX ORDER — HALOPERIDOL 5 MG/ML
5 INJECTION INTRAMUSCULAR ONCE
Status: COMPLETED | OUTPATIENT
Start: 2024-01-01 | End: 2024-01-01

## 2024-01-01 RX ORDER — SODIUM CHLORIDE, SODIUM LACTATE, POTASSIUM CHLORIDE, CALCIUM CHLORIDE 600; 310; 30; 20 MG/100ML; MG/100ML; MG/100ML; MG/100ML
INJECTION, SOLUTION INTRAVENOUS CONTINUOUS
Status: DISCONTINUED | OUTPATIENT
Start: 2024-01-01 | End: 2024-01-01

## 2024-01-01 RX ORDER — TACROLIMUS 0.5 MG/1
0.5 CAPSULE ORAL EVERY MORNING
COMMUNITY
Start: 2024-01-01 | End: 2024-01-01

## 2024-01-01 RX ORDER — FERROUS SULFATE 325(65) MG
325 TABLET ORAL 2 TIMES DAILY WITH MEALS
Qty: 60 TABLET | Refills: 6 | Status: SHIPPED | OUTPATIENT
Start: 2024-01-01 | End: 2024-01-01

## 2024-01-01 RX ORDER — FERROUS SULFATE 325(65) MG
325 TABLET ORAL 2 TIMES DAILY WITH MEALS
Status: DISCONTINUED | OUTPATIENT
Start: 2024-01-01 | End: 2024-01-01 | Stop reason: HOSPADM

## 2024-01-01 RX ORDER — LEVALBUTEROL TARTRATE 45 UG/1
2 AEROSOL, METERED ORAL
Status: DISCONTINUED | OUTPATIENT
Start: 2024-01-01 | End: 2024-01-01 | Stop reason: HOSPADM

## 2024-01-01 RX ORDER — TRAMADOL HYDROCHLORIDE 50 MG/1
50 TABLET ORAL EVERY 6 HOURS PRN
Qty: 12 TABLET | Refills: 0 | Status: SHIPPED | OUTPATIENT
Start: 2024-01-01 | End: 2024-01-01

## 2024-01-01 RX ORDER — MYCOPHENOLATE MOFETIL 250 MG/1
500 CAPSULE ORAL
Status: DISCONTINUED | OUTPATIENT
Start: 2024-01-01 | End: 2024-01-01 | Stop reason: HOSPADM

## 2024-01-01 RX ORDER — TACROLIMUS 1 MG/1
3 CAPSULE ORAL 2 TIMES DAILY
Status: CANCELLED | OUTPATIENT
Start: 2024-01-01

## 2024-01-01 RX ORDER — BUMETANIDE 1 MG/1
3 TABLET ORAL 2 TIMES DAILY
Qty: 540 TABLET | Refills: 3 | Status: ON HOLD | OUTPATIENT
Start: 2024-01-01 | End: 2024-01-01

## 2024-01-01 RX ORDER — NICOTINE POLACRILEX 4 MG
15-30 LOZENGE BUCCAL
Status: DISCONTINUED | OUTPATIENT
Start: 2024-01-01 | End: 2024-01-01 | Stop reason: HOSPADM

## 2024-01-01 RX ORDER — WARFARIN SODIUM 2.5 MG/1
TABLET ORAL
Qty: 30 TABLET | Refills: 3 | Status: ON HOLD | OUTPATIENT
Start: 2024-01-01 | End: 2024-01-01

## 2024-01-01 RX ORDER — MELATONIN 10 MG
1 CAPSULE ORAL AT BEDTIME
Status: DISCONTINUED | OUTPATIENT
Start: 2024-01-01 | End: 2024-01-01

## 2024-01-01 RX ORDER — MAGNESIUM OXIDE 400 MG/1
400 TABLET ORAL EVERY 4 HOURS
Status: COMPLETED | OUTPATIENT
Start: 2024-01-01 | End: 2024-01-01

## 2024-01-01 RX ORDER — IPRATROPIUM BROMIDE AND ALBUTEROL SULFATE 2.5; .5 MG/3ML; MG/3ML
3 SOLUTION RESPIRATORY (INHALATION) EVERY 6 HOURS PRN
Status: DISCONTINUED | OUTPATIENT
Start: 2024-01-01 | End: 2024-01-01 | Stop reason: HOSPADM

## 2024-01-01 RX ORDER — SODIUM CHLORIDE, SODIUM LACTATE, POTASSIUM CHLORIDE, CALCIUM CHLORIDE 600; 310; 30; 20 MG/100ML; MG/100ML; MG/100ML; MG/100ML
1000 INJECTION, SOLUTION INTRAVENOUS CONTINUOUS
Status: DISCONTINUED | OUTPATIENT
Start: 2024-01-01 | End: 2024-01-01

## 2024-01-01 RX ORDER — ALLOPURINOL 300 MG/1
300 TABLET ORAL DAILY
Status: CANCELLED | OUTPATIENT
Start: 2024-01-01

## 2024-01-01 RX ORDER — ALBUTEROL SULFATE 90 UG/1
2 INHALANT RESPIRATORY (INHALATION) EVERY 4 HOURS PRN
Status: CANCELLED | OUTPATIENT
Start: 2024-01-01

## 2024-01-01 RX ORDER — WARFARIN SODIUM 2.5 MG/1
TABLET ORAL
Status: ACTIVE | COMMUNITY
Start: 2024-01-01

## 2024-01-01 RX ORDER — WARFARIN SODIUM 2.5 MG/1
TABLET ORAL
Qty: 135 TABLET | Refills: 1 | Status: SHIPPED | OUTPATIENT
Start: 2024-01-01

## 2024-01-01 RX ORDER — WARFARIN SODIUM 2.5 MG/1
2.5 TABLET ORAL
Status: DISCONTINUED | OUTPATIENT
Start: 2024-01-01 | End: 2024-01-01

## 2024-01-01 RX ORDER — DEXAMETHASONE SODIUM PHOSPHATE 4 MG/ML
INJECTION, SOLUTION INTRA-ARTICULAR; INTRALESIONAL; INTRAMUSCULAR; INTRAVENOUS; SOFT TISSUE PRN
Status: DISCONTINUED | OUTPATIENT
Start: 2024-01-01 | End: 2024-01-01

## 2024-01-01 RX ORDER — CALCIUM CARBONATE 500 MG/1
1000 TABLET, CHEWABLE ORAL
Status: DISCONTINUED | OUTPATIENT
Start: 2024-01-01 | End: 2024-01-01

## 2024-01-01 RX ORDER — FENTANYL CITRATE 50 UG/ML
25 INJECTION, SOLUTION INTRAMUSCULAR; INTRAVENOUS EVERY 5 MIN PRN
Status: DISCONTINUED | OUTPATIENT
Start: 2024-01-01 | End: 2024-01-01 | Stop reason: HOSPADM

## 2024-01-01 RX ORDER — CALCIUM GLUCONATE 20 MG/ML
2 INJECTION, SOLUTION INTRAVENOUS ONCE
Status: COMPLETED | OUTPATIENT
Start: 2024-01-01 | End: 2024-01-01

## 2024-01-01 RX ORDER — ALBUTEROL SULFATE 90 UG/1
2 AEROSOL, METERED RESPIRATORY (INHALATION) EVERY 6 HOURS PRN
Status: DISCONTINUED | OUTPATIENT
Start: 2024-01-01 | End: 2024-01-01

## 2024-01-01 RX ORDER — TACROLIMUS 1 MG/1
CAPSULE ORAL
Qty: 360 CAPSULE | Refills: 11 | Status: SHIPPED | OUTPATIENT
Start: 2024-01-01 | End: 2024-01-01

## 2024-01-01 RX ORDER — ROSUVASTATIN CALCIUM 10 MG/1
10 TABLET, COATED ORAL DAILY
Qty: 100 TABLET | Refills: 2 | OUTPATIENT
Start: 2024-01-01

## 2024-01-01 RX ORDER — POTASSIUM CHLORIDE 7.45 MG/ML
10 INJECTION INTRAVENOUS ONCE
Status: COMPLETED | OUTPATIENT
Start: 2024-01-01 | End: 2024-01-01

## 2024-01-01 RX ORDER — ONDANSETRON 2 MG/ML
INJECTION INTRAMUSCULAR; INTRAVENOUS PRN
Status: DISCONTINUED | OUTPATIENT
Start: 2024-01-01 | End: 2024-01-01

## 2024-01-01 RX ORDER — SENNOSIDES 8.6 MG
8.6 TABLET ORAL 2 TIMES DAILY PRN
Status: DISCONTINUED | OUTPATIENT
Start: 2024-01-01 | End: 2024-01-01 | Stop reason: HOSPADM

## 2024-01-01 RX ADMIN — BUMETANIDE 1 MG/HR: 0.25 INJECTION, SOLUTION INTRAMUSCULAR; INTRAVENOUS at 10:21

## 2024-01-01 RX ADMIN — POLYETHYLENE GLYCOL 3350 17 G: 17 POWDER, FOR SOLUTION ORAL at 18:21

## 2024-01-01 RX ADMIN — CALCIUM CARBONATE (ANTACID) CHEW TAB 500 MG 1000 MG: 500 CHEW TAB at 08:54

## 2024-01-01 RX ADMIN — LEVALBUTEROL TARTRATE 2 PUFF: 45 AEROSOL, METERED ORAL at 23:21

## 2024-01-01 RX ADMIN — GABAPENTIN 300 MG: 300 CAPSULE ORAL at 19:49

## 2024-01-01 RX ADMIN — ALLOPURINOL 100 MG: 100 TABLET ORAL at 08:36

## 2024-01-01 RX ADMIN — INSULIN ASPART 1 UNITS: 100 INJECTION, SOLUTION INTRAVENOUS; SUBCUTANEOUS at 15:43

## 2024-01-01 RX ADMIN — SODIUM CHLORIDE, POTASSIUM CHLORIDE, SODIUM LACTATE AND CALCIUM CHLORIDE 1000 ML: 600; 310; 30; 20 INJECTION, SOLUTION INTRAVENOUS at 12:21

## 2024-01-01 RX ADMIN — CALCITRIOL CAPSULES 0.25 MCG 0.25 MCG: 0.25 CAPSULE ORAL at 07:44

## 2024-01-01 RX ADMIN — BUPROPION HYDROCHLORIDE 75 MG: 75 TABLET, FILM COATED ORAL at 20:00

## 2024-01-01 RX ADMIN — ACETAMINOPHEN 975 MG: 325 TABLET, FILM COATED ORAL at 08:06

## 2024-01-01 RX ADMIN — BUPROPION HYDROCHLORIDE 300 MG: 300 TABLET, EXTENDED RELEASE ORAL at 09:12

## 2024-01-01 RX ADMIN — INSULIN ASPART 2 UNITS: 100 INJECTION, SOLUTION INTRAVENOUS; SUBCUTANEOUS at 13:01

## 2024-01-01 RX ADMIN — IPRATROPIUM BROMIDE AND ALBUTEROL SULFATE 3 ML: .5; 3 SOLUTION RESPIRATORY (INHALATION) at 05:01

## 2024-01-01 RX ADMIN — BUMETANIDE 3 MG: 2 TABLET ORAL at 09:15

## 2024-01-01 RX ADMIN — GABAPENTIN 300 MG: 300 CAPSULE ORAL at 20:41

## 2024-01-01 RX ADMIN — FUROSEMIDE 40 MG: 10 INJECTION, SOLUTION INTRAVENOUS at 13:39

## 2024-01-01 RX ADMIN — TACROLIMUS 90 MCG/HR: 5 INJECTION, SOLUTION INTRAVENOUS at 08:09

## 2024-01-01 RX ADMIN — ROSUVASTATIN CALCIUM 10 MG: 10 TABLET, FILM COATED ORAL at 08:21

## 2024-01-01 RX ADMIN — ACETAMINOPHEN 975 MG: 325 TABLET, FILM COATED ORAL at 06:45

## 2024-01-01 RX ADMIN — METHOCARBAMOL 1000 MG: 100 INJECTION, SOLUTION INTRAMUSCULAR; INTRAVENOUS at 20:17

## 2024-01-01 RX ADMIN — FENTANYL CITRATE 50 MCG: 50 INJECTION INTRAMUSCULAR; INTRAVENOUS at 02:21

## 2024-01-01 RX ADMIN — ROSUVASTATIN CALCIUM 10 MG: 10 TABLET, FILM COATED ORAL at 09:02

## 2024-01-01 RX ADMIN — FUROSEMIDE 20 MG: 10 INJECTION, SOLUTION INTRAMUSCULAR; INTRAVENOUS at 15:52

## 2024-01-01 RX ADMIN — LEVALBUTEROL TARTRATE 2 PUFF: 45 AEROSOL, METERED ORAL at 10:25

## 2024-01-01 RX ADMIN — PIPERACILLIN AND TAZOBACTAM 3.38 G: 3; .375 INJECTION, POWDER, LYOPHILIZED, FOR SOLUTION INTRAVENOUS at 15:17

## 2024-01-01 RX ADMIN — WARFARIN SODIUM 9 MG: 5 TABLET ORAL at 18:41

## 2024-01-01 RX ADMIN — Medication 20 MG: at 02:38

## 2024-01-01 RX ADMIN — FERROUS SULFATE TAB 325 MG (65 MG ELEMENTAL FE) 325 MG: 325 (65 FE) TAB at 09:16

## 2024-01-01 RX ADMIN — ROSUVASTATIN CALCIUM 10 MG: 10 TABLET, FILM COATED ORAL at 08:56

## 2024-01-01 RX ADMIN — MYCOPHENOLATE MOFETIL 500 MG: 250 CAPSULE ORAL at 18:21

## 2024-01-01 RX ADMIN — FERROUS SULFATE TAB 325 MG (65 MG ELEMENTAL FE) 325 MG: 325 (65 FE) TAB at 17:11

## 2024-01-01 RX ADMIN — IRON SUCROSE 200 MG: 20 INJECTION, SOLUTION INTRAVENOUS at 10:13

## 2024-01-01 RX ADMIN — MYCOPHENOLATE MOFETIL 250 MG: 250 CAPSULE ORAL at 18:45

## 2024-01-01 RX ADMIN — FERROUS SULFATE TAB 325 MG (65 MG ELEMENTAL FE) 325 MG: 325 (65 FE) TAB at 17:18

## 2024-01-01 RX ADMIN — PIPERACILLIN AND TAZOBACTAM 3.38 G: 3; .375 INJECTION, POWDER, LYOPHILIZED, FOR SOLUTION INTRAVENOUS at 09:04

## 2024-01-01 RX ADMIN — ALBUTEROL SULFATE 2 PUFF: 90 AEROSOL, METERED RESPIRATORY (INHALATION) at 20:08

## 2024-01-01 RX ADMIN — MYCOPHENOLATE MOFETIL 250 MG: 250 CAPSULE ORAL at 18:22

## 2024-01-01 RX ADMIN — ONDANSETRON 4 MG: 2 INJECTION INTRAMUSCULAR; INTRAVENOUS at 14:47

## 2024-01-01 RX ADMIN — TACROLIMUS 3 MG: 1 CAPSULE ORAL at 18:44

## 2024-01-01 RX ADMIN — ALBUTEROL SULFATE 2 PUFF: 90 AEROSOL, METERED RESPIRATORY (INHALATION) at 20:48

## 2024-01-01 RX ADMIN — HUMAN ALBUMIN MICROSPHERES AND PERFLUTREN 5 ML: 10; .22 INJECTION, SOLUTION INTRAVENOUS at 12:28

## 2024-01-01 RX ADMIN — HYDRALAZINE HYDROCHLORIDE 25 MG: 25 TABLET ORAL at 15:37

## 2024-01-01 RX ADMIN — BUPROPION HYDROCHLORIDE 300 MG: 300 TABLET, FILM COATED, EXTENDED RELEASE ORAL at 09:17

## 2024-01-01 RX ADMIN — ACETAMINOPHEN 975 MG: 325 TABLET, FILM COATED ORAL at 13:39

## 2024-01-01 RX ADMIN — INSULIN ASPART 1 UNITS: 100 INJECTION, SOLUTION INTRAVENOUS; SUBCUTANEOUS at 17:04

## 2024-01-01 RX ADMIN — TACROLIMUS 3.5 MG: 1 CAPSULE ORAL at 08:20

## 2024-01-01 RX ADMIN — TACROLIMUS 3.5 MG: 1 CAPSULE ORAL at 18:12

## 2024-01-01 RX ADMIN — FENTANYL CITRATE 50 MCG: 50 INJECTION INTRAMUSCULAR; INTRAVENOUS at 01:24

## 2024-01-01 RX ADMIN — ALLOPURINOL 300 MG: 300 TABLET ORAL at 08:08

## 2024-01-01 RX ADMIN — POTASSIUM CHLORIDE 40 MEQ: 1.5 POWDER, FOR SOLUTION ORAL at 08:55

## 2024-01-01 RX ADMIN — PIPERACILLIN AND TAZOBACTAM 3.38 G: 3; .375 INJECTION, POWDER, LYOPHILIZED, FOR SOLUTION INTRAVENOUS at 22:27

## 2024-01-01 RX ADMIN — BUPROPION HYDROCHLORIDE 300 MG: 300 TABLET, FILM COATED, EXTENDED RELEASE ORAL at 08:20

## 2024-01-01 RX ADMIN — METHOCARBAMOL 500 MG: 500 TABLET ORAL at 08:55

## 2024-01-01 RX ADMIN — TACROLIMUS 3.5 MG: 1 CAPSULE ORAL at 08:34

## 2024-01-01 RX ADMIN — BUPROPION HYDROCHLORIDE 300 MG: 300 TABLET, FILM COATED, EXTENDED RELEASE ORAL at 08:43

## 2024-01-01 RX ADMIN — PIPERACILLIN AND TAZOBACTAM 3.38 G: 3; .375 INJECTION, POWDER, LYOPHILIZED, FOR SOLUTION INTRAVENOUS at 15:36

## 2024-01-01 RX ADMIN — HYDROMORPHONE HYDROCHLORIDE 0.2 MG: 0.2 INJECTION, SOLUTION INTRAMUSCULAR; INTRAVENOUS; SUBCUTANEOUS at 22:56

## 2024-01-01 RX ADMIN — MONTELUKAST 10 MG: 10 TABLET, FILM COATED ORAL at 22:13

## 2024-01-01 RX ADMIN — WARFARIN SODIUM 5 MG: 5 TABLET ORAL at 17:49

## 2024-01-01 RX ADMIN — TRAMADOL HYDROCHLORIDE 25 MG: 50 TABLET, COATED ORAL at 22:35

## 2024-01-01 RX ADMIN — HUMAN ALBUMIN MICROSPHERES AND PERFLUTREN 3 ML: 10; .22 INJECTION, SOLUTION INTRAVENOUS at 07:53

## 2024-01-01 RX ADMIN — PIPERACILLIN AND TAZOBACTAM 3.38 G: 3; .375 INJECTION, POWDER, LYOPHILIZED, FOR SOLUTION INTRAVENOUS at 21:54

## 2024-01-01 RX ADMIN — TACROLIMUS 3 MG: 1 CAPSULE ORAL at 18:22

## 2024-01-01 RX ADMIN — TRAMADOL HYDROCHLORIDE 50 MG: 50 TABLET, COATED ORAL at 14:13

## 2024-01-01 RX ADMIN — FUROSEMIDE 40 MG: 10 INJECTION, SOLUTION INTRAVENOUS at 16:19

## 2024-01-01 RX ADMIN — ACETAMINOPHEN 975 MG: 325 TABLET, FILM COATED ORAL at 04:10

## 2024-01-01 RX ADMIN — MYCOPHENOLATE MOFETIL 250 MG: 500 INJECTION, POWDER, LYOPHILIZED, FOR SOLUTION INTRAVENOUS at 19:36

## 2024-01-01 RX ADMIN — HEPARIN SODIUM 2000 UNITS: 1000 INJECTION INTRAVENOUS; SUBCUTANEOUS at 10:14

## 2024-01-01 RX ADMIN — Medication 10 MG: at 22:14

## 2024-01-01 RX ADMIN — MYCOPHENOLATE MOFETIL 500 MG: 250 CAPSULE ORAL at 09:16

## 2024-01-01 RX ADMIN — Medication 10 MG: at 01:57

## 2024-01-01 RX ADMIN — SODIUM CHLORIDE, POTASSIUM CHLORIDE, SODIUM LACTATE AND CALCIUM CHLORIDE 500 ML: 600; 310; 30; 20 INJECTION, SOLUTION INTRAVENOUS at 10:31

## 2024-01-01 RX ADMIN — HYDROMORPHONE HYDROCHLORIDE 0.2 MG: 0.2 INJECTION, SOLUTION INTRAMUSCULAR; INTRAVENOUS; SUBCUTANEOUS at 10:09

## 2024-01-01 RX ADMIN — TACROLIMUS 3.5 MG: 1 CAPSULE ORAL at 08:25

## 2024-01-01 RX ADMIN — IPRATROPIUM BROMIDE AND ALBUTEROL SULFATE 3 ML: .5; 3 SOLUTION RESPIRATORY (INHALATION) at 14:13

## 2024-01-01 RX ADMIN — FERROUS SULFATE TAB 325 MG (65 MG ELEMENTAL FE) 325 MG: 325 (65 FE) TAB at 18:13

## 2024-01-01 RX ADMIN — ROSUVASTATIN CALCIUM 10 MG: 10 TABLET, FILM COATED ORAL at 08:36

## 2024-01-01 RX ADMIN — TACROLIMUS 3.5 MG: 1 CAPSULE ORAL at 09:02

## 2024-01-01 RX ADMIN — MYCOPHENOLATE MOFETIL 250 MG: 500 INJECTION, POWDER, LYOPHILIZED, FOR SOLUTION INTRAVENOUS at 20:02

## 2024-01-01 RX ADMIN — HEPARIN SODIUM 1600 UNITS/HR: 10000 INJECTION, SOLUTION INTRAVENOUS at 06:20

## 2024-01-01 RX ADMIN — TACROLIMUS 3.5 MG: 1 CAPSULE ORAL at 17:18

## 2024-01-01 RX ADMIN — ROSUVASTATIN CALCIUM 10 MG: 10 TABLET, FILM COATED ORAL at 08:43

## 2024-01-01 RX ADMIN — HALOPERIDOL LACTATE 5 MG: 5 INJECTION, SOLUTION INTRAMUSCULAR at 06:18

## 2024-01-01 RX ADMIN — MYCOPHENOLATE MOFETIL 250 MG: 250 CAPSULE ORAL at 19:49

## 2024-01-01 RX ADMIN — Medication 1 TABLET: at 08:21

## 2024-01-01 RX ADMIN — CALCIUM CARBONATE (ANTACID) CHEW TAB 500 MG 1000 MG: 500 CHEW TAB at 17:11

## 2024-01-01 RX ADMIN — ALBUTEROL SULFATE 2.5 MG: 2.5 SOLUTION RESPIRATORY (INHALATION) at 03:36

## 2024-01-01 RX ADMIN — ACETAMINOPHEN 650 MG: 325 TABLET ORAL at 20:30

## 2024-01-01 RX ADMIN — HEPARIN SODIUM 1600 UNITS/HR: 10000 INJECTION, SOLUTION INTRAVENOUS at 10:44

## 2024-01-01 RX ADMIN — POTASSIUM CHLORIDE 40 MEQ: 750 TABLET, EXTENDED RELEASE ORAL at 09:02

## 2024-01-01 RX ADMIN — ALBUTEROL SULFATE 2 PUFF: 90 AEROSOL, METERED RESPIRATORY (INHALATION) at 08:49

## 2024-01-01 RX ADMIN — ALBUTEROL SULFATE 2 PUFF: 90 AEROSOL, METERED RESPIRATORY (INHALATION) at 00:22

## 2024-01-01 RX ADMIN — ROSUVASTATIN CALCIUM 10 MG: 10 TABLET, FILM COATED ORAL at 08:18

## 2024-01-01 RX ADMIN — IPRATROPIUM BROMIDE AND ALBUTEROL SULFATE 3 ML: .5; 3 SOLUTION RESPIRATORY (INHALATION) at 22:56

## 2024-01-01 RX ADMIN — HEPARIN SODIUM 5000 UNITS: 5000 INJECTION, SOLUTION INTRAVENOUS; SUBCUTANEOUS at 17:00

## 2024-01-01 RX ADMIN — ROSUVASTATIN CALCIUM 10 MG: 10 TABLET, FILM COATED ORAL at 08:26

## 2024-01-01 RX ADMIN — Medication 5 MG: at 22:38

## 2024-01-01 RX ADMIN — Medication 1 TABLET: at 14:10

## 2024-01-01 RX ADMIN — TACROLIMUS 3.5 MG: 1 CAPSULE ORAL at 08:55

## 2024-01-01 RX ADMIN — CALCIUM CARBONATE (ANTACID) CHEW TAB 500 MG 1000 MG: 500 CHEW TAB at 13:12

## 2024-01-01 RX ADMIN — METHOCARBAMOL 500 MG: 500 TABLET ORAL at 15:36

## 2024-01-01 RX ADMIN — METHOCARBAMOL 1000 MG: 100 INJECTION, SOLUTION INTRAMUSCULAR; INTRAVENOUS at 01:10

## 2024-01-01 RX ADMIN — FENTANYL CITRATE 50 MCG: 50 INJECTION, SOLUTION INTRAMUSCULAR; INTRAVENOUS at 09:52

## 2024-01-01 RX ADMIN — BUPROPION HYDROCHLORIDE 75 MG: 75 TABLET, FILM COATED ORAL at 08:15

## 2024-01-01 RX ADMIN — TACROLIMUS 3.5 MG: 1 CAPSULE ORAL at 07:44

## 2024-01-01 RX ADMIN — MYCOPHENOLATE MOFETIL 500 MG: 250 CAPSULE ORAL at 09:17

## 2024-01-01 RX ADMIN — ACETAMINOPHEN 975 MG: 325 TABLET, FILM COATED ORAL at 20:40

## 2024-01-01 RX ADMIN — IPRATROPIUM BROMIDE AND ALBUTEROL SULFATE 3 ML: .5; 3 SOLUTION RESPIRATORY (INHALATION) at 18:50

## 2024-01-01 RX ADMIN — HEPARIN SODIUM 5000 UNITS: 5000 INJECTION, SOLUTION INTRAVENOUS; SUBCUTANEOUS at 15:33

## 2024-01-01 RX ADMIN — LEVALBUTEROL TARTRATE 2 PUFF: 45 AEROSOL, METERED ORAL at 14:40

## 2024-01-01 RX ADMIN — MYCOPHENOLATE MOFETIL 250 MG: 500 INJECTION, POWDER, LYOPHILIZED, FOR SOLUTION INTRAVENOUS at 08:07

## 2024-01-01 RX ADMIN — HEPARIN SODIUM 1400 UNITS/HR: 10000 INJECTION, SOLUTION INTRAVENOUS at 18:48

## 2024-01-01 RX ADMIN — LORAZEPAM 0.5 MG: 0.5 TABLET ORAL at 21:05

## 2024-01-01 RX ADMIN — TACROLIMUS 3 MG: 1 CAPSULE ORAL at 18:40

## 2024-01-01 RX ADMIN — SODIUM CHLORIDE, POTASSIUM CHLORIDE, SODIUM LACTATE AND CALCIUM CHLORIDE: 600; 310; 30; 20 INJECTION, SOLUTION INTRAVENOUS at 01:18

## 2024-01-01 RX ADMIN — SUGAMMADEX 200 MG: 100 INJECTION, SOLUTION INTRAVENOUS at 05:05

## 2024-01-01 RX ADMIN — DIPHENHYDRAMINE HYDROCHLORIDE 25 MG: 50 INJECTION, SOLUTION INTRAMUSCULAR; INTRAVENOUS at 19:23

## 2024-01-01 RX ADMIN — ALLOPURINOL 300 MG: 300 TABLET ORAL at 08:43

## 2024-01-01 RX ADMIN — HYDRALAZINE HYDROCHLORIDE 5 MG: 20 INJECTION INTRAMUSCULAR; INTRAVENOUS at 06:27

## 2024-01-01 RX ADMIN — Medication 10 MG: at 23:39

## 2024-01-01 RX ADMIN — ALBUTEROL SULFATE 2 PUFF: 90 AEROSOL, METERED RESPIRATORY (INHALATION) at 11:08

## 2024-01-01 RX ADMIN — BUPROPION HYDROCHLORIDE 300 MG: 300 TABLET, FILM COATED, EXTENDED RELEASE ORAL at 07:57

## 2024-01-01 RX ADMIN — IRON SUCROSE 200 MG: 20 INJECTION, SOLUTION INTRAVENOUS at 10:35

## 2024-01-01 RX ADMIN — HYDROXYZINE HYDROCHLORIDE 25 MG: 25 TABLET ORAL at 21:47

## 2024-01-01 RX ADMIN — MONTELUKAST 10 MG: 10 TABLET, FILM COATED ORAL at 22:39

## 2024-01-01 RX ADMIN — MYCOPHENOLATE MOFETIL 500 MG: 250 CAPSULE ORAL at 17:49

## 2024-01-01 RX ADMIN — HEPARIN SODIUM 1600 UNITS/HR: 10000 INJECTION, SOLUTION INTRAVENOUS at 06:24

## 2024-01-01 RX ADMIN — LEVALBUTEROL TARTRATE 2 PUFF: 45 AEROSOL, METERED ORAL at 15:37

## 2024-01-01 RX ADMIN — POTASSIUM CHLORIDE 20 MEQ: 1.5 POWDER, FOR SOLUTION ORAL at 10:09

## 2024-01-01 RX ADMIN — Medication 10 MG: at 22:12

## 2024-01-01 RX ADMIN — TACROLIMUS 3 MG: 1 CAPSULE ORAL at 17:49

## 2024-01-01 RX ADMIN — DIPHENHYDRAMINE HYDROCHLORIDE 25 MG: 25 CAPSULE ORAL at 21:32

## 2024-01-01 RX ADMIN — HEPARIN SODIUM 5000 UNITS: 5000 INJECTION, SOLUTION INTRAVENOUS; SUBCUTANEOUS at 15:36

## 2024-01-01 RX ADMIN — Medication 10 MG: at 22:02

## 2024-01-01 RX ADMIN — ALBUTEROL SULFATE 2 PUFF: 90 AEROSOL, METERED RESPIRATORY (INHALATION) at 08:21

## 2024-01-01 RX ADMIN — LIDOCAINE HYDROCHLORIDE 10 ML: 10 INJECTION, SOLUTION EPIDURAL; INFILTRATION; INTRACAUDAL; PERINEURAL at 09:57

## 2024-01-01 RX ADMIN — ASPIRIN 81 MG CHEWABLE TABLET 81 MG: 81 TABLET CHEWABLE at 07:57

## 2024-01-01 RX ADMIN — CALCITRIOL CAPSULES 0.25 MCG 0.25 MCG: 0.25 CAPSULE ORAL at 08:53

## 2024-01-01 RX ADMIN — ROSUVASTATIN CALCIUM 10 MG: 10 TABLET, FILM COATED ORAL at 09:16

## 2024-01-01 RX ADMIN — ASPIRIN 81 MG CHEWABLE TABLET 81 MG: 81 TABLET CHEWABLE at 08:26

## 2024-01-01 RX ADMIN — MYCOPHENOLATE MOFETIL 500 MG: 250 CAPSULE ORAL at 18:25

## 2024-01-01 RX ADMIN — MYCOPHENOLATE MOFETIL 250 MG: 500 INJECTION, POWDER, LYOPHILIZED, FOR SOLUTION INTRAVENOUS at 20:42

## 2024-01-01 RX ADMIN — HEPARIN SODIUM 5000 UNITS: 5000 INJECTION, SOLUTION INTRAVENOUS; SUBCUTANEOUS at 08:56

## 2024-01-01 RX ADMIN — HEPARIN SODIUM 5000 UNITS: 5000 INJECTION, SOLUTION INTRAVENOUS; SUBCUTANEOUS at 22:28

## 2024-01-01 RX ADMIN — LORAZEPAM 0.5 MG: 0.5 TABLET ORAL at 17:44

## 2024-01-01 RX ADMIN — LEVALBUTEROL TARTRATE 2 PUFF: 45 AEROSOL, METERED ORAL at 17:09

## 2024-01-01 RX ADMIN — Medication 5 MG: at 22:28

## 2024-01-01 RX ADMIN — ALBUTEROL SULFATE 2.5 MG: 2.5 SOLUTION RESPIRATORY (INHALATION) at 05:10

## 2024-01-01 RX ADMIN — LEVALBUTEROL TARTRATE 2 PUFF: 45 AEROSOL, METERED ORAL at 15:11

## 2024-01-01 RX ADMIN — METHOCARBAMOL 1000 MG: 100 INJECTION, SOLUTION INTRAMUSCULAR; INTRAVENOUS at 13:40

## 2024-01-01 RX ADMIN — BUMETANIDE 4 MG: 0.25 INJECTION INTRAMUSCULAR; INTRAVENOUS at 13:16

## 2024-01-01 RX ADMIN — SALINE NASAL SPRAY 1 SPRAY: 1.5 SOLUTION NASAL at 21:16

## 2024-01-01 RX ADMIN — Medication 25 MCG: at 08:54

## 2024-01-01 RX ADMIN — TACROLIMUS 3.5 MG: 1 CAPSULE ORAL at 08:22

## 2024-01-01 RX ADMIN — GABAPENTIN 300 MG: 300 CAPSULE ORAL at 09:16

## 2024-01-01 RX ADMIN — HYDRALAZINE HYDROCHLORIDE 10 MG: 20 INJECTION INTRAMUSCULAR; INTRAVENOUS at 17:43

## 2024-01-01 RX ADMIN — BUPROPION HYDROCHLORIDE 75 MG: 75 TABLET, FILM COATED ORAL at 09:18

## 2024-01-01 RX ADMIN — MIDAZOLAM 1 MG: 1 INJECTION INTRAMUSCULAR; INTRAVENOUS at 09:52

## 2024-01-01 RX ADMIN — INSULIN ASPART 1 UNITS: 100 INJECTION, SOLUTION INTRAVENOUS; SUBCUTANEOUS at 09:05

## 2024-01-01 RX ADMIN — LEVALBUTEROL TARTRATE 2 PUFF: 45 AEROSOL, METERED ORAL at 00:04

## 2024-01-01 RX ADMIN — METHOCARBAMOL 1000 MG: 100 INJECTION, SOLUTION INTRAMUSCULAR; INTRAVENOUS at 09:16

## 2024-01-01 RX ADMIN — ALLOPURINOL 300 MG: 300 TABLET ORAL at 07:57

## 2024-01-01 RX ADMIN — ACETAMINOPHEN 975 MG: 325 TABLET, FILM COATED ORAL at 09:16

## 2024-01-01 RX ADMIN — MONTELUKAST 10 MG: 10 TABLET, FILM COATED ORAL at 22:18

## 2024-01-01 RX ADMIN — CALCIUM GLUCONATE 1 G: 20 INJECTION, SOLUTION INTRAVENOUS at 12:58

## 2024-01-01 RX ADMIN — MYCOPHENOLATE MOFETIL 250 MG: 250 CAPSULE ORAL at 08:18

## 2024-01-01 RX ADMIN — BUMETANIDE 1 MG: 1 TABLET ORAL at 09:16

## 2024-01-01 RX ADMIN — ACETAMINOPHEN 975 MG: 325 TABLET, FILM COATED ORAL at 15:36

## 2024-01-01 RX ADMIN — WARFARIN SODIUM 5 MG: 5 TABLET ORAL at 18:21

## 2024-01-01 RX ADMIN — Medication 10 MG: at 22:31

## 2024-01-01 RX ADMIN — PIPERACILLIN AND TAZOBACTAM 3.38 G: 3; .375 INJECTION, POWDER, LYOPHILIZED, FOR SOLUTION INTRAVENOUS at 17:22

## 2024-01-01 RX ADMIN — SODIUM CHLORIDE 250 ML: 9 INJECTION, SOLUTION INTRAVENOUS at 12:17

## 2024-01-01 RX ADMIN — MYCOPHENOLATE MOFETIL 500 MG: 250 CAPSULE ORAL at 19:24

## 2024-01-01 RX ADMIN — POLYETHYLENE GLYCOL 3350 17 G: 17 POWDER, FOR SOLUTION ORAL at 09:17

## 2024-01-01 RX ADMIN — PIPERACILLIN AND TAZOBACTAM 3.38 G: 3; .375 INJECTION, POWDER, LYOPHILIZED, FOR SOLUTION INTRAVENOUS at 04:57

## 2024-01-01 RX ADMIN — ONDANSETRON 4 MG: 4 TABLET, ORALLY DISINTEGRATING ORAL at 04:44

## 2024-01-01 RX ADMIN — FERROUS SULFATE TAB 325 MG (65 MG ELEMENTAL FE) 325 MG: 325 (65 FE) TAB at 11:16

## 2024-01-01 RX ADMIN — TACROLIMUS 90 MCG/HR: 5 INJECTION, SOLUTION INTRAVENOUS at 20:41

## 2024-01-01 RX ADMIN — INSULIN ASPART 1 UNITS: 100 INJECTION, SOLUTION INTRAVENOUS; SUBCUTANEOUS at 00:03

## 2024-01-01 RX ADMIN — IPRATROPIUM BROMIDE AND ALBUTEROL SULFATE 3 ML: .5; 3 SOLUTION RESPIRATORY (INHALATION) at 09:17

## 2024-01-01 RX ADMIN — MYCOPHENOLATE MOFETIL 250 MG: 250 CAPSULE ORAL at 08:08

## 2024-01-01 RX ADMIN — TACROLIMUS 90 MCG/HR: 5 INJECTION, SOLUTION INTRAVENOUS at 20:01

## 2024-01-01 RX ADMIN — IPRATROPIUM BROMIDE AND ALBUTEROL SULFATE 3 ML: .5; 3 SOLUTION RESPIRATORY (INHALATION) at 04:28

## 2024-01-01 RX ADMIN — MONTELUKAST 10 MG: 10 TABLET, FILM COATED ORAL at 21:45

## 2024-01-01 RX ADMIN — HEPARIN SODIUM 5000 UNITS: 5000 INJECTION, SOLUTION INTRAVENOUS; SUBCUTANEOUS at 23:59

## 2024-01-01 RX ADMIN — METHOCARBAMOL 500 MG: 500 TABLET ORAL at 11:45

## 2024-01-01 RX ADMIN — WARFARIN SODIUM 5 MG: 5 TABLET ORAL at 17:51

## 2024-01-01 RX ADMIN — ALLOPURINOL 300 MG: 300 TABLET ORAL at 08:18

## 2024-01-01 RX ADMIN — ASPIRIN 81 MG CHEWABLE TABLET 81 MG: 81 TABLET CHEWABLE at 08:07

## 2024-01-01 RX ADMIN — HEPARIN SODIUM 1600 UNITS/HR: 10000 INJECTION, SOLUTION INTRAVENOUS at 14:45

## 2024-01-01 RX ADMIN — LEVALBUTEROL TARTRATE 2 PUFF: 45 AEROSOL, METERED ORAL at 01:10

## 2024-01-01 RX ADMIN — ROSUVASTATIN CALCIUM 10 MG: 10 TABLET, FILM COATED ORAL at 08:08

## 2024-01-01 RX ADMIN — TACROLIMUS 3.5 MG: 1 CAPSULE ORAL at 09:12

## 2024-01-01 RX ADMIN — SUGAMMADEX 200 MG: 100 INJECTION, SOLUTION INTRAVENOUS at 04:32

## 2024-01-01 RX ADMIN — SODIUM CHLORIDE 250 ML: 9 INJECTION, SOLUTION INTRAVENOUS at 12:43

## 2024-01-01 RX ADMIN — BUPROPION HYDROCHLORIDE 75 MG: 75 TABLET, FILM COATED ORAL at 08:55

## 2024-01-01 RX ADMIN — ACETAMINOPHEN 975 MG: 325 TABLET, FILM COATED ORAL at 08:55

## 2024-01-01 RX ADMIN — ALLOPURINOL 100 MG: 100 TABLET ORAL at 08:54

## 2024-01-01 RX ADMIN — HEPARIN SODIUM 5000 UNITS: 5000 INJECTION, SOLUTION INTRAVENOUS; SUBCUTANEOUS at 15:17

## 2024-01-01 RX ADMIN — HYDROMORPHONE HYDROCHLORIDE 0.2 MG: 0.2 INJECTION, SOLUTION INTRAMUSCULAR; INTRAVENOUS; SUBCUTANEOUS at 14:25

## 2024-01-01 RX ADMIN — WARFARIN SODIUM 7.5 MG: 7.5 TABLET ORAL at 18:25

## 2024-01-01 RX ADMIN — ALBUTEROL SULFATE 6 PUFF: 108 INHALANT RESPIRATORY (INHALATION) at 04:32

## 2024-01-01 RX ADMIN — Medication 10 MG: at 22:49

## 2024-01-01 RX ADMIN — MYCOPHENOLATE MOFETIL 500 MG: 250 CAPSULE ORAL at 18:42

## 2024-01-01 RX ADMIN — GABAPENTIN 300 MG: 300 CAPSULE ORAL at 20:01

## 2024-01-01 RX ADMIN — CALCIUM CARBONATE (ANTACID) CHEW TAB 500 MG 1000 MG: 500 CHEW TAB at 08:35

## 2024-01-01 RX ADMIN — ACETAMINOPHEN 975 MG: 325 TABLET, FILM COATED ORAL at 20:00

## 2024-01-01 RX ADMIN — HEPARIN SODIUM 1600 UNITS/HR: 10000 INJECTION, SOLUTION INTRAVENOUS at 15:09

## 2024-01-01 RX ADMIN — ALBUTEROL SULFATE 2.5 MG: 2.5 SOLUTION RESPIRATORY (INHALATION) at 05:43

## 2024-01-01 RX ADMIN — HEPARIN SODIUM 5000 UNITS: 5000 INJECTION, SOLUTION INTRAVENOUS; SUBCUTANEOUS at 08:06

## 2024-01-01 RX ADMIN — TRAMADOL HYDROCHLORIDE 50 MG: 50 TABLET, COATED ORAL at 19:17

## 2024-01-01 RX ADMIN — METHOCARBAMOL 500 MG: 500 TABLET ORAL at 20:00

## 2024-01-01 RX ADMIN — TACROLIMUS 90 MCG/HR: 5 INJECTION, SOLUTION INTRAVENOUS at 09:20

## 2024-01-01 RX ADMIN — LIDOCAINE 2 PATCH: 4 PATCH TOPICAL at 20:06

## 2024-01-01 RX ADMIN — SODIUM CHLORIDE, POTASSIUM CHLORIDE, SODIUM LACTATE AND CALCIUM CHLORIDE 1000 ML: 600; 310; 30; 20 INJECTION, SOLUTION INTRAVENOUS at 22:35

## 2024-01-01 RX ADMIN — ASPIRIN 81 MG CHEWABLE TABLET 81 MG: 81 TABLET CHEWABLE at 08:43

## 2024-01-01 RX ADMIN — Medication 25 MCG: at 08:37

## 2024-01-01 RX ADMIN — LEVALBUTEROL TARTRATE 2 PUFF: 45 AEROSOL, METERED ORAL at 11:12

## 2024-01-01 RX ADMIN — GABAPENTIN 300 MG: 300 CAPSULE ORAL at 08:55

## 2024-01-01 RX ADMIN — PROPOFOL 160 MG: 10 INJECTION, EMULSION INTRAVENOUS at 01:24

## 2024-01-01 RX ADMIN — CALCIUM CARBONATE (ANTACID) CHEW TAB 500 MG 1000 MG: 500 CHEW TAB at 08:37

## 2024-01-01 RX ADMIN — CEFAZOLIN SODIUM 2 G: 2 INJECTION, SOLUTION INTRAVENOUS at 08:58

## 2024-01-01 RX ADMIN — ASPIRIN 81 MG CHEWABLE TABLET 81 MG: 81 TABLET CHEWABLE at 08:20

## 2024-01-01 RX ADMIN — PIPERACILLIN AND TAZOBACTAM 3.38 G: 3; .375 INJECTION, POWDER, LYOPHILIZED, FOR SOLUTION INTRAVENOUS at 23:06

## 2024-01-01 RX ADMIN — BUMETANIDE 3 MG: 1 TABLET ORAL at 12:30

## 2024-01-01 RX ADMIN — SEVELAMER HYDROCHLORIDE 400 MG: 400 TABLET, FILM COATED ORAL at 14:53

## 2024-01-01 RX ADMIN — ESMOLOL HYDROCHLORIDE 5 MG: 10 INJECTION, SOLUTION INTRAVENOUS at 03:16

## 2024-01-01 RX ADMIN — IPRATROPIUM BROMIDE AND ALBUTEROL SULFATE 3 ML: .5; 3 SOLUTION RESPIRATORY (INHALATION) at 10:19

## 2024-01-01 RX ADMIN — ACETAMINOPHEN 650 MG: 325 TABLET ORAL at 15:09

## 2024-01-01 RX ADMIN — GABAPENTIN 300 MG: 300 CAPSULE ORAL at 08:05

## 2024-01-01 RX ADMIN — MYCOPHENOLATE MOFETIL 500 MG: 250 CAPSULE ORAL at 08:43

## 2024-01-01 RX ADMIN — HEPARIN SODIUM 5000 UNITS: 5000 INJECTION, SOLUTION INTRAVENOUS; SUBCUTANEOUS at 09:03

## 2024-01-01 RX ADMIN — ACETAMINOPHEN 975 MG: 325 TABLET, FILM COATED ORAL at 05:35

## 2024-01-01 RX ADMIN — BUPROPION HYDROCHLORIDE 300 MG: 300 TABLET, FILM COATED, EXTENDED RELEASE ORAL at 08:08

## 2024-01-01 RX ADMIN — INSULIN ASPART 1 UNITS: 100 INJECTION, SOLUTION INTRAVENOUS; SUBCUTANEOUS at 09:51

## 2024-01-01 RX ADMIN — BUMETANIDE 3 MG: 1 TABLET ORAL at 16:09

## 2024-01-01 RX ADMIN — TACROLIMUS 3.5 MG: 1 CAPSULE ORAL at 08:37

## 2024-01-01 RX ADMIN — ALBUTEROL SULFATE 2 PUFF: 108 INHALANT RESPIRATORY (INHALATION) at 21:47

## 2024-01-01 RX ADMIN — FERROUS SULFATE TAB 325 MG (65 MG ELEMENTAL FE) 325 MG: 325 (65 FE) TAB at 08:36

## 2024-01-01 RX ADMIN — Medication: at 18:13

## 2024-01-01 RX ADMIN — ROSUVASTATIN CALCIUM 10 MG: 10 TABLET, FILM COATED ORAL at 07:57

## 2024-01-01 RX ADMIN — LEVALBUTEROL TARTRATE 2 PUFF: 45 AEROSOL, METERED ORAL at 05:43

## 2024-01-01 RX ADMIN — HEPARIN SODIUM 5000 UNITS: 5000 INJECTION, SOLUTION INTRAVENOUS; SUBCUTANEOUS at 08:00

## 2024-01-01 RX ADMIN — WARFARIN SODIUM 6 MG: 3 TABLET ORAL at 18:12

## 2024-01-01 RX ADMIN — Medication 10 MG: at 22:22

## 2024-01-01 RX ADMIN — HYDRALAZINE HYDROCHLORIDE 25 MG: 25 TABLET ORAL at 19:50

## 2024-01-01 RX ADMIN — SODIUM CHLORIDE 250 ML: 9 INJECTION, SOLUTION INTRAVENOUS at 16:11

## 2024-01-01 RX ADMIN — HEPARIN SODIUM 5000 UNITS: 5000 INJECTION, SOLUTION INTRAVENOUS; SUBCUTANEOUS at 23:06

## 2024-01-01 RX ADMIN — CALCIUM CARBONATE (ANTACID) CHEW TAB 500 MG 1000 MG: 500 CHEW TAB at 12:43

## 2024-01-01 RX ADMIN — HEPARIN SODIUM 5000 UNITS: 5000 INJECTION, SOLUTION INTRAVENOUS; SUBCUTANEOUS at 22:48

## 2024-01-01 RX ADMIN — HYDROMORPHONE HYDROCHLORIDE 0.2 MG: 0.2 INJECTION, SOLUTION INTRAMUSCULAR; INTRAVENOUS; SUBCUTANEOUS at 06:49

## 2024-01-01 RX ADMIN — MAGNESIUM OXIDE TAB 400 MG (241.3 MG ELEMENTAL MG) 400 MG: 400 (241.3 MG) TAB at 12:20

## 2024-01-01 RX ADMIN — IPRATROPIUM BROMIDE AND ALBUTEROL SULFATE 3 ML: .5; 3 SOLUTION RESPIRATORY (INHALATION) at 09:28

## 2024-01-01 RX ADMIN — FENTANYL CITRATE 25 MCG: 50 INJECTION, SOLUTION INTRAMUSCULAR; INTRAVENOUS at 10:05

## 2024-01-01 RX ADMIN — IPRATROPIUM BROMIDE AND ALBUTEROL SULFATE 3 ML: .5; 3 SOLUTION RESPIRATORY (INHALATION) at 19:29

## 2024-01-01 RX ADMIN — PREDNISONE 40 MG: 20 TABLET ORAL at 08:52

## 2024-01-01 RX ADMIN — MONTELUKAST 10 MG: 10 TABLET, FILM COATED ORAL at 02:50

## 2024-01-01 RX ADMIN — MYCOPHENOLATE MOFETIL 250 MG: 250 CAPSULE ORAL at 18:40

## 2024-01-01 RX ADMIN — LEVALBUTEROL TARTRATE 2 PUFF: 45 AEROSOL, METERED ORAL at 09:25

## 2024-01-01 RX ADMIN — TACROLIMUS 3 MG: 1 CAPSULE ORAL at 18:42

## 2024-01-01 RX ADMIN — ROSUVASTATIN CALCIUM 10 MG: 10 TABLET, FILM COATED ORAL at 08:05

## 2024-01-01 RX ADMIN — MYCOPHENOLATE MOFETIL 250 MG: 500 INJECTION, POWDER, LYOPHILIZED, FOR SOLUTION INTRAVENOUS at 08:11

## 2024-01-01 RX ADMIN — WARFARIN SODIUM 7.5 MG: 7.5 TABLET ORAL at 19:08

## 2024-01-01 RX ADMIN — CEFEPIME 2 G: 2 INJECTION, POWDER, FOR SOLUTION INTRAVENOUS at 09:59

## 2024-01-01 RX ADMIN — DEXAMETHASONE SODIUM PHOSPHATE 10 MG: 4 INJECTION, SOLUTION INTRA-ARTICULAR; INTRALESIONAL; INTRAMUSCULAR; INTRAVENOUS; SOFT TISSUE at 02:43

## 2024-01-01 RX ADMIN — PIPERACILLIN AND TAZOBACTAM 3.38 G: 3; .375 INJECTION, POWDER, LYOPHILIZED, FOR SOLUTION INTRAVENOUS at 03:29

## 2024-01-01 RX ADMIN — MONTELUKAST 10 MG: 10 TABLET, FILM COATED ORAL at 22:02

## 2024-01-01 RX ADMIN — ACETAMINOPHEN 975 MG: 325 TABLET, FILM COATED ORAL at 19:49

## 2024-01-01 RX ADMIN — INSULIN ASPART 1 UNITS: 100 INJECTION, SOLUTION INTRAVENOUS; SUBCUTANEOUS at 23:11

## 2024-01-01 RX ADMIN — ACETAMINOPHEN 975 MG: 325 TABLET, FILM COATED ORAL at 04:31

## 2024-01-01 RX ADMIN — CALCIUM GLUCONATE 2 G: 20 INJECTION, SOLUTION INTRAVENOUS at 06:02

## 2024-01-01 RX ADMIN — MONTELUKAST 10 MG: 10 TABLET, FILM COATED ORAL at 21:53

## 2024-01-01 RX ADMIN — MYCOPHENOLATE MOFETIL 250 MG: 250 CAPSULE ORAL at 07:57

## 2024-01-01 RX ADMIN — MAGNESIUM OXIDE TAB 400 MG (241.3 MG ELEMENTAL MG) 400 MG: 400 (241.3 MG) TAB at 08:09

## 2024-01-01 RX ADMIN — HEPARIN SODIUM 1600 UNITS/HR: 10000 INJECTION, SOLUTION INTRAVENOUS at 21:34

## 2024-01-01 RX ADMIN — BUMETANIDE 3 MG: 1 TABLET ORAL at 08:53

## 2024-01-01 RX ADMIN — BUPROPION HYDROCHLORIDE 75 MG: 75 TABLET, FILM COATED ORAL at 19:49

## 2024-01-01 RX ADMIN — HEPARIN SODIUM 5000 UNITS: 5000 INJECTION, SOLUTION INTRAVENOUS; SUBCUTANEOUS at 22:19

## 2024-01-01 RX ADMIN — CALCIUM CARBONATE (ANTACID) CHEW TAB 500 MG 1000 MG: 500 CHEW TAB at 17:50

## 2024-01-01 RX ADMIN — POLYETHYLENE GLYCOL 3350 17 G: 17 POWDER, FOR SOLUTION ORAL at 08:32

## 2024-01-01 RX ADMIN — HYDROMORPHONE HYDROCHLORIDE 0.5 MG: 1 INJECTION, SOLUTION INTRAMUSCULAR; INTRAVENOUS; SUBCUTANEOUS at 18:17

## 2024-01-01 RX ADMIN — ACETAMINOPHEN 650 MG: 325 TABLET ORAL at 11:13

## 2024-01-01 RX ADMIN — ALLOPURINOL 300 MG: 300 TABLET ORAL at 08:21

## 2024-01-01 RX ADMIN — TACROLIMUS 3.5 MG: 1 CAPSULE ORAL at 08:43

## 2024-01-01 RX ADMIN — POTASSIUM CHLORIDE 40 MEQ: 750 TABLET, EXTENDED RELEASE ORAL at 18:31

## 2024-01-01 RX ADMIN — CALCIUM CARBONATE (ANTACID) CHEW TAB 500 MG 1000 MG: 500 CHEW TAB at 09:18

## 2024-01-01 RX ADMIN — ACETAMINOPHEN 975 MG: 325 TABLET, FILM COATED ORAL at 19:50

## 2024-01-01 RX ADMIN — MYCOPHENOLATE MOFETIL 500 MG: 250 CAPSULE ORAL at 08:26

## 2024-01-01 RX ADMIN — Medication 1 TABLET: at 09:16

## 2024-01-01 RX ADMIN — ROSUVASTATIN CALCIUM 10 MG: 10 TABLET, FILM COATED ORAL at 07:44

## 2024-01-01 RX ADMIN — ALLOPURINOL 100 MG: 100 TABLET ORAL at 21:48

## 2024-01-01 RX ADMIN — TRAMADOL HYDROCHLORIDE 25 MG: 50 TABLET, COATED ORAL at 16:18

## 2024-01-01 RX ADMIN — PANTOPRAZOLE SODIUM 40 MG: 40 INJECTION, POWDER, FOR SOLUTION INTRAVENOUS at 20:52

## 2024-01-01 RX ADMIN — WARFARIN SODIUM 3.75 MG: 7.5 TABLET ORAL at 17:11

## 2024-01-01 RX ADMIN — TACROLIMUS 3 MG: 1 CAPSULE ORAL at 18:21

## 2024-01-01 RX ADMIN — LEVALBUTEROL TARTRATE 2 PUFF: 45 AEROSOL, METERED ORAL at 18:06

## 2024-01-01 RX ADMIN — Medication 10 MG: at 22:26

## 2024-01-01 RX ADMIN — Medication 1 TABLET: at 08:23

## 2024-01-01 RX ADMIN — SODIUM CHLORIDE: 9 INJECTION, SOLUTION INTRAVENOUS at 08:58

## 2024-01-01 RX ADMIN — GUAIFENESIN 1200 MG: 600 TABLET ORAL at 13:01

## 2024-01-01 RX ADMIN — CALCIUM CARBONATE (ANTACID) CHEW TAB 500 MG 1000 MG: 500 CHEW TAB at 07:44

## 2024-01-01 RX ADMIN — CIPROFLOXACIN 500 MG: 500 TABLET, COATED ORAL at 08:53

## 2024-01-01 RX ADMIN — BUPROPION HYDROCHLORIDE 300 MG: 300 TABLET, EXTENDED RELEASE ORAL at 08:37

## 2024-01-01 RX ADMIN — MYCOPHENOLATE MOFETIL 500 MG: 250 CAPSULE ORAL at 08:35

## 2024-01-01 RX ADMIN — BUPROPION HYDROCHLORIDE 300 MG: 300 TABLET, FILM COATED, EXTENDED RELEASE ORAL at 08:22

## 2024-01-01 RX ADMIN — ONDANSETRON 4 MG: 2 INJECTION INTRAMUSCULAR; INTRAVENOUS at 18:17

## 2024-01-01 RX ADMIN — PREDNISONE 40 MG: 20 TABLET ORAL at 16:09

## 2024-01-01 RX ADMIN — PIPERACILLIN AND TAZOBACTAM 3.38 G: 3; .375 INJECTION, POWDER, LYOPHILIZED, FOR SOLUTION INTRAVENOUS at 03:44

## 2024-01-01 RX ADMIN — POTASSIUM CHLORIDE 10 MEQ: 7.46 INJECTION, SOLUTION INTRAVENOUS at 09:17

## 2024-01-01 RX ADMIN — BUPROPION HYDROCHLORIDE 300 MG: 300 TABLET, EXTENDED RELEASE ORAL at 07:44

## 2024-01-01 RX ADMIN — IPRATROPIUM BROMIDE AND ALBUTEROL SULFATE 3 ML: .5; 3 SOLUTION RESPIRATORY (INHALATION) at 17:32

## 2024-01-01 RX ADMIN — INSULIN HUMAN 1 UNITS/HR: 1 INJECTION, SOLUTION INTRAVENOUS at 04:01

## 2024-01-01 RX ADMIN — Medication 25 MCG: at 08:36

## 2024-01-01 RX ADMIN — INSULIN ASPART 2 UNITS: 100 INJECTION, SOLUTION INTRAVENOUS; SUBCUTANEOUS at 12:15

## 2024-01-01 RX ADMIN — MONTELUKAST 10 MG: 10 TABLET, FILM COATED ORAL at 22:22

## 2024-01-01 RX ADMIN — INSULIN ASPART 1 UNITS: 100 INJECTION, SOLUTION INTRAVENOUS; SUBCUTANEOUS at 23:05

## 2024-01-01 RX ADMIN — CALCIUM CARBONATE (ANTACID) CHEW TAB 500 MG 1000 MG: 500 CHEW TAB at 17:18

## 2024-01-01 RX ADMIN — IRON SUCROSE 200 MG: 20 INJECTION, SOLUTION INTRAVENOUS at 11:36

## 2024-01-01 RX ADMIN — METHOCARBAMOL 500 MG: 500 TABLET ORAL at 09:02

## 2024-01-01 RX ADMIN — ACETAMINOPHEN 975 MG: 325 TABLET, FILM COATED ORAL at 20:08

## 2024-01-01 RX ADMIN — ASPIRIN 81 MG CHEWABLE TABLET 81 MG: 81 TABLET CHEWABLE at 08:22

## 2024-01-01 RX ADMIN — ALBUTEROL SULFATE 2.5 MG: 2.5 SOLUTION RESPIRATORY (INHALATION) at 01:24

## 2024-01-01 RX ADMIN — Medication 10 MG: at 22:18

## 2024-01-01 RX ADMIN — ALBUTEROL SULFATE 2 PUFF: 108 INHALANT RESPIRATORY (INHALATION) at 03:36

## 2024-01-01 RX ADMIN — Medication 25 MCG: at 09:16

## 2024-01-01 RX ADMIN — TACROLIMUS 3.5 MG: 1 CAPSULE ORAL at 17:50

## 2024-01-01 RX ADMIN — SODIUM CHLORIDE, SODIUM LACTATE, POTASSIUM CHLORIDE, CALCIUM CHLORIDE: 600; 310; 30; 20 INJECTION, SOLUTION INTRAVENOUS at 02:29

## 2024-01-01 RX ADMIN — HYDROMORPHONE HYDROCHLORIDE 0.2 MG: 0.2 INJECTION, SOLUTION INTRAMUSCULAR; INTRAVENOUS; SUBCUTANEOUS at 12:29

## 2024-01-01 RX ADMIN — BUMETANIDE 3 MG: 1 TABLET ORAL at 15:09

## 2024-01-01 RX ADMIN — MONTELUKAST 10 MG: 10 TABLET, FILM COATED ORAL at 20:04

## 2024-01-01 RX ADMIN — INSULIN ASPART 1 UNITS: 100 INJECTION, SOLUTION INTRAVENOUS; SUBCUTANEOUS at 04:34

## 2024-01-01 RX ADMIN — INSULIN ASPART 2 UNITS: 100 INJECTION, SOLUTION INTRAVENOUS; SUBCUTANEOUS at 20:13

## 2024-01-01 RX ADMIN — BUPROPION HYDROCHLORIDE 75 MG: 75 TABLET, FILM COATED ORAL at 09:02

## 2024-01-01 RX ADMIN — HYDROMORPHONE HYDROCHLORIDE 0.5 MG: 1 INJECTION, SOLUTION INTRAMUSCULAR; INTRAVENOUS; SUBCUTANEOUS at 20:05

## 2024-01-01 RX ADMIN — ROSUVASTATIN CALCIUM 10 MG: 10 TABLET, FILM COATED ORAL at 08:37

## 2024-01-01 RX ADMIN — LEVALBUTEROL TARTRATE 2 PUFF: 45 AEROSOL, METERED ORAL at 23:39

## 2024-01-01 RX ADMIN — ACETAMINOPHEN 975 MG: 325 TABLET, FILM COATED ORAL at 18:32

## 2024-01-01 RX ADMIN — MYCOPHENOLATE MOFETIL 250 MG: 250 CAPSULE ORAL at 09:03

## 2024-01-01 RX ADMIN — ASPIRIN 81 MG CHEWABLE TABLET 81 MG: 81 TABLET CHEWABLE at 09:16

## 2024-01-01 RX ADMIN — ACETAMINOPHEN 650 MG: 325 TABLET ORAL at 17:18

## 2024-01-01 RX ADMIN — POLYETHYLENE GLYCOL 3350 17 G: 17 POWDER, FOR SOLUTION ORAL at 08:45

## 2024-01-01 RX ADMIN — PIPERACILLIN AND TAZOBACTAM 3.38 G: 3; .375 INJECTION, POWDER, LYOPHILIZED, FOR SOLUTION INTRAVENOUS at 05:00

## 2024-01-01 RX ADMIN — PIPERACILLIN AND TAZOBACTAM 3.38 G: 3; .375 INJECTION, POWDER, LYOPHILIZED, FOR SOLUTION INTRAVENOUS at 09:16

## 2024-01-01 RX ADMIN — Medication 80 MG: at 01:24

## 2024-01-01 RX ADMIN — SODIUM CHLORIDE, POTASSIUM CHLORIDE, SODIUM LACTATE AND CALCIUM CHLORIDE: 600; 310; 30; 20 INJECTION, SOLUTION INTRAVENOUS at 07:51

## 2024-01-01 RX ADMIN — MYCOPHENOLATE MOFETIL 250 MG: 500 INJECTION, POWDER, LYOPHILIZED, FOR SOLUTION INTRAVENOUS at 19:57

## 2024-01-01 RX ADMIN — MYCOPHENOLATE MOFETIL 500 MG: 250 CAPSULE ORAL at 08:53

## 2024-01-01 RX ADMIN — TACROLIMUS 3 MG: 1 CAPSULE ORAL at 18:25

## 2024-01-01 RX ADMIN — HYDRALAZINE HYDROCHLORIDE 5 MG: 20 INJECTION INTRAMUSCULAR; INTRAVENOUS at 14:08

## 2024-01-01 RX ADMIN — ROSUVASTATIN CALCIUM 10 MG: 10 TABLET, FILM COATED ORAL at 08:54

## 2024-01-01 RX ADMIN — MYCOPHENOLATE MOFETIL 500 MG: 250 CAPSULE ORAL at 20:04

## 2024-01-01 RX ADMIN — ALBUTEROL SULFATE 2 PUFF: 90 AEROSOL, METERED RESPIRATORY (INHALATION) at 20:52

## 2024-01-01 RX ADMIN — Medication 1 TABLET: at 08:26

## 2024-01-01 RX ADMIN — HEPARIN SODIUM 1600 UNITS/HR: 10000 INJECTION, SOLUTION INTRAVENOUS at 02:09

## 2024-01-01 RX ADMIN — HYDROMORPHONE HYDROCHLORIDE 0.5 MG: 1 INJECTION, SOLUTION INTRAMUSCULAR; INTRAVENOUS; SUBCUTANEOUS at 04:25

## 2024-01-01 RX ADMIN — ACETAMINOPHEN 975 MG: 325 TABLET, FILM COATED ORAL at 20:21

## 2024-01-01 RX ADMIN — BUPROPION HYDROCHLORIDE 300 MG: 300 TABLET, FILM COATED, EXTENDED RELEASE ORAL at 08:18

## 2024-01-01 RX ADMIN — METHOCARBAMOL 500 MG: 500 TABLET ORAL at 08:05

## 2024-01-01 RX ADMIN — MYCOPHENOLATE MOFETIL 500 MG: 250 CAPSULE ORAL at 07:44

## 2024-01-01 RX ADMIN — TRAMADOL HYDROCHLORIDE 50 MG: 50 TABLET, COATED ORAL at 14:50

## 2024-01-01 RX ADMIN — MYCOPHENOLATE MOFETIL 500 MG: 250 CAPSULE ORAL at 20:59

## 2024-01-01 RX ADMIN — INSULIN ASPART 1 UNITS: 100 INJECTION, SOLUTION INTRAVENOUS; SUBCUTANEOUS at 20:09

## 2024-01-01 RX ADMIN — ACETAMINOPHEN 975 MG: 325 TABLET, FILM COATED ORAL at 09:02

## 2024-01-01 RX ADMIN — FUROSEMIDE 40 MG: 10 INJECTION, SOLUTION INTRAVENOUS at 17:39

## 2024-01-01 RX ADMIN — HEPARIN SODIUM 1600 UNITS/HR: 10000 INJECTION, SOLUTION INTRAVENOUS at 18:49

## 2024-01-01 RX ADMIN — PIPERACILLIN AND TAZOBACTAM 3.38 G: 3; .375 INJECTION, POWDER, LYOPHILIZED, FOR SOLUTION INTRAVENOUS at 22:01

## 2024-01-01 RX ADMIN — Medication 10 MG: at 21:55

## 2024-01-01 RX ADMIN — ALLOPURINOL 100 MG: 100 TABLET ORAL at 07:45

## 2024-01-01 RX ADMIN — Medication 10 MG: at 21:45

## 2024-01-01 RX ADMIN — MYCOPHENOLATE MOFETIL 250 MG: 500 INJECTION, POWDER, LYOPHILIZED, FOR SOLUTION INTRAVENOUS at 09:19

## 2024-01-01 RX ADMIN — INSULIN ASPART 1 UNITS: 100 INJECTION, SOLUTION INTRAVENOUS; SUBCUTANEOUS at 05:01

## 2024-01-01 RX ADMIN — MONTELUKAST 10 MG: 10 TABLET, FILM COATED ORAL at 22:56

## 2024-01-01 RX ADMIN — IPRATROPIUM BROMIDE AND ALBUTEROL SULFATE 3 ML: .5; 3 SOLUTION RESPIRATORY (INHALATION) at 13:34

## 2024-01-01 RX ADMIN — HYDROMORPHONE HYDROCHLORIDE 0.2 MG: 1 INJECTION, SOLUTION INTRAMUSCULAR; INTRAVENOUS; SUBCUTANEOUS at 07:01

## 2024-01-01 RX ADMIN — TACROLIMUS 3.5 MG: 1 CAPSULE ORAL at 08:52

## 2024-01-01 RX ADMIN — LIDOCAINE 2 PATCH: 4 PATCH TOPICAL at 21:54

## 2024-01-01 RX ADMIN — INSULIN ASPART 1 UNITS: 100 INJECTION, SOLUTION INTRAVENOUS; SUBCUTANEOUS at 09:03

## 2024-01-01 RX ADMIN — BUMETANIDE 3 MG: 1 TABLET ORAL at 08:35

## 2024-01-01 RX ADMIN — MYCOPHENOLATE MOFETIL 500 MG: 250 CAPSULE ORAL at 19:08

## 2024-01-01 RX ADMIN — PROPOFOL 90 MG: 10 INJECTION, EMULSION INTRAVENOUS at 01:53

## 2024-01-01 RX ADMIN — Medication 25 MCG: at 07:45

## 2024-01-01 RX ADMIN — ACETAMINOPHEN 975 MG: 325 TABLET, FILM COATED ORAL at 08:32

## 2024-01-01 RX ADMIN — BUMETANIDE 1 MG/HR: 0.25 INJECTION, SOLUTION INTRAMUSCULAR; INTRAVENOUS at 11:25

## 2024-01-01 RX ADMIN — FUROSEMIDE 40 MG: 10 INJECTION, SOLUTION INTRAVENOUS at 12:19

## 2024-01-01 RX ADMIN — CALCIUM CARBONATE (ANTACID) CHEW TAB 500 MG 1000 MG: 500 CHEW TAB at 12:30

## 2024-01-01 RX ADMIN — TRAMADOL HYDROCHLORIDE 50 MG: 50 TABLET, COATED ORAL at 22:49

## 2024-01-01 RX ADMIN — FERROUS SULFATE TAB 325 MG (65 MG ELEMENTAL FE) 325 MG: 325 (65 FE) TAB at 07:45

## 2024-01-01 RX ADMIN — LEVALBUTEROL TARTRATE 2 PUFF: 45 AEROSOL, METERED ORAL at 20:05

## 2024-01-01 RX ADMIN — ACETAMINOPHEN 975 MG: 325 TABLET, FILM COATED ORAL at 07:57

## 2024-01-01 RX ADMIN — PIPERACILLIN AND TAZOBACTAM 3.38 G: 3; .375 INJECTION, POWDER, LYOPHILIZED, FOR SOLUTION INTRAVENOUS at 11:11

## 2024-01-01 RX ADMIN — TRAMADOL HYDROCHLORIDE 50 MG: 50 TABLET, COATED ORAL at 15:10

## 2024-01-01 RX ADMIN — HYDRALAZINE HYDROCHLORIDE 10 MG: 20 INJECTION INTRAMUSCULAR; INTRAVENOUS at 19:02

## 2024-01-01 RX ADMIN — LEVALBUTEROL TARTRATE 2 PUFF: 45 AEROSOL, METERED ORAL at 04:34

## 2024-01-01 RX ADMIN — TACROLIMUS 3.5 MG: 1 CAPSULE ORAL at 08:18

## 2024-01-01 RX ADMIN — MONTELUKAST 10 MG: 10 TABLET, FILM COATED ORAL at 21:00

## 2024-01-01 RX ADMIN — SODIUM CHLORIDE 500 ML: 9 INJECTION, SOLUTION INTRAVENOUS at 13:24

## 2024-01-01 RX ADMIN — BUPROPION HYDROCHLORIDE 300 MG: 300 TABLET, FILM COATED, EXTENDED RELEASE ORAL at 08:26

## 2024-01-01 RX ADMIN — TACROLIMUS 3 MG: 1 CAPSULE ORAL at 19:24

## 2024-01-01 RX ADMIN — ALBUTEROL SULFATE 2 PUFF: 90 AEROSOL, METERED RESPIRATORY (INHALATION) at 14:14

## 2024-01-01 RX ADMIN — TACROLIMUS 3.5 MG: 1 CAPSULE ORAL at 01:57

## 2024-01-01 RX ADMIN — METHOCARBAMOL 500 MG: 500 TABLET ORAL at 12:02

## 2024-01-01 RX ADMIN — ALLOPURINOL 300 MG: 300 TABLET ORAL at 08:23

## 2024-01-01 RX ADMIN — TACROLIMUS 90 MCG/HR: 5 INJECTION, SOLUTION INTRAVENOUS at 02:33

## 2024-01-01 RX ADMIN — ASPIRIN 81 MG CHEWABLE TABLET 81 MG: 81 TABLET CHEWABLE at 09:02

## 2024-01-01 RX ADMIN — ASPIRIN 81 MG CHEWABLE TABLET 81 MG: 81 TABLET CHEWABLE at 08:18

## 2024-01-01 RX ADMIN — PIPERACILLIN AND TAZOBACTAM 3.38 G: 3; .375 INJECTION, POWDER, FOR SOLUTION INTRAVENOUS at 02:28

## 2024-01-01 RX ADMIN — WARFARIN SODIUM 4 MG: 4 TABLET ORAL at 17:18

## 2024-01-01 RX ADMIN — OXYCODONE HYDROCHLORIDE 5 MG: 5 TABLET ORAL at 14:46

## 2024-01-01 RX ADMIN — BUPROPION HYDROCHLORIDE 75 MG: 75 TABLET, FILM COATED ORAL at 20:44

## 2024-01-01 RX ADMIN — ROSUVASTATIN CALCIUM 10 MG: 10 TABLET, FILM COATED ORAL at 09:15

## 2024-01-01 RX ADMIN — ACETAMINOPHEN 650 MG: 325 TABLET ORAL at 23:52

## 2024-01-01 RX ADMIN — MIDAZOLAM 0.5 MG: 1 INJECTION INTRAMUSCULAR; INTRAVENOUS at 10:05

## 2024-01-01 RX ADMIN — TACROLIMUS 3.5 MG: 1 CAPSULE ORAL at 09:17

## 2024-01-01 RX ADMIN — CEFEPIME HYDROCHLORIDE 1 G: 1 INJECTION, POWDER, FOR SOLUTION INTRAMUSCULAR; INTRAVENOUS at 08:36

## 2024-01-01 RX ADMIN — Medication 10 MG: at 22:51

## 2024-01-01 RX ADMIN — IPRATROPIUM BROMIDE AND ALBUTEROL SULFATE 3 ML: .5; 3 SOLUTION RESPIRATORY (INHALATION) at 12:58

## 2024-01-01 RX ADMIN — ALBUTEROL SULFATE 6 PUFF: 108 INHALANT RESPIRATORY (INHALATION) at 03:01

## 2024-01-01 RX ADMIN — ONDANSETRON 4 MG: 2 INJECTION INTRAMUSCULAR; INTRAVENOUS at 04:12

## 2024-01-01 RX ADMIN — METHOCARBAMOL 1000 MG: 100 INJECTION, SOLUTION INTRAMUSCULAR; INTRAVENOUS at 20:44

## 2024-01-01 RX ADMIN — BUMETANIDE 2 MG: 2 TABLET ORAL at 13:01

## 2024-01-01 RX ADMIN — CIPROFLOXACIN 500 MG: 500 TABLET, COATED ORAL at 16:09

## 2024-01-01 RX ADMIN — IPRATROPIUM BROMIDE AND ALBUTEROL SULFATE 3 ML: .5; 3 SOLUTION RESPIRATORY (INHALATION) at 17:48

## 2024-01-01 RX ADMIN — MYCOPHENOLATE MOFETIL 500 MG: 250 CAPSULE ORAL at 08:22

## 2024-01-01 RX ADMIN — Medication: at 17:32

## 2024-01-01 RX ADMIN — METHOCARBAMOL 500 MG: 500 TABLET ORAL at 13:01

## 2024-01-01 RX ADMIN — IPRATROPIUM BROMIDE AND ALBUTEROL SULFATE 3 ML: .5; 3 SOLUTION RESPIRATORY (INHALATION) at 23:05

## 2024-01-01 RX ADMIN — CALCITRIOL CAPSULES 0.25 MCG 0.25 MCG: 0.25 CAPSULE ORAL at 09:16

## 2024-01-01 RX ADMIN — ALLOPURINOL 300 MG: 300 TABLET ORAL at 08:26

## 2024-01-01 RX ADMIN — MYCOPHENOLATE MOFETIL 500 MG: 250 CAPSULE ORAL at 08:20

## 2024-01-01 RX ADMIN — SEVELAMER HYDROCHLORIDE 400 MG: 400 TABLET, FILM COATED ORAL at 20:59

## 2024-01-01 RX ADMIN — FERROUS SULFATE TAB 325 MG (65 MG ELEMENTAL FE) 325 MG: 325 (65 FE) TAB at 08:37

## 2024-01-01 RX ADMIN — BUMETANIDE 1 MG/HR: 0.25 INJECTION, SOLUTION INTRAMUSCULAR; INTRAVENOUS at 13:40

## 2024-01-01 RX ADMIN — METHOCARBAMOL 500 MG: 500 TABLET ORAL at 19:50

## 2024-01-01 RX ADMIN — ALBUTEROL SULFATE 1 PUFF: 90 AEROSOL, METERED RESPIRATORY (INHALATION) at 08:57

## 2024-01-01 RX ADMIN — HYDROMORPHONE HYDROCHLORIDE 0.2 MG: 0.2 INJECTION, SOLUTION INTRAMUSCULAR; INTRAVENOUS; SUBCUTANEOUS at 22:38

## 2024-01-01 RX ADMIN — BUPROPION HYDROCHLORIDE 300 MG: 300 TABLET, EXTENDED RELEASE ORAL at 08:53

## 2024-01-01 RX ADMIN — HYDRALAZINE HYDROCHLORIDE 25 MG: 25 TABLET ORAL at 08:55

## 2024-01-01 RX ADMIN — ACETAMINOPHEN 975 MG: 325 TABLET, FILM COATED ORAL at 04:28

## 2024-01-01 RX ADMIN — SEVELAMER HYDROCHLORIDE 400 MG: 400 TABLET, FILM COATED ORAL at 15:09

## 2024-01-01 RX ADMIN — HYDRALAZINE HYDROCHLORIDE 10 MG: 20 INJECTION INTRAMUSCULAR; INTRAVENOUS at 23:06

## 2024-01-01 RX ADMIN — SODIUM CHLORIDE, POTASSIUM CHLORIDE, SODIUM LACTATE AND CALCIUM CHLORIDE 500 ML: 600; 310; 30; 20 INJECTION, SOLUTION INTRAVENOUS at 12:16

## 2024-01-01 RX ADMIN — MYCOPHENOLATE MOFETIL 250 MG: 250 CAPSULE ORAL at 08:55

## 2024-01-01 RX ADMIN — HEPARIN SODIUM 5000 UNITS: 5000 INJECTION, SOLUTION INTRAVENOUS; SUBCUTANEOUS at 09:16

## 2024-01-01 RX ADMIN — CEFEPIME 2 G: 2 INJECTION, POWDER, FOR SOLUTION INTRAVENOUS at 22:28

## 2024-01-01 RX ADMIN — GUAIFENESIN 1200 MG: 600 TABLET ORAL at 09:16

## 2024-01-01 RX ADMIN — BUPROPION HYDROCHLORIDE 300 MG: 300 TABLET, EXTENDED RELEASE ORAL at 08:36

## 2024-01-01 RX ADMIN — CALCIUM CARBONATE (ANTACID) CHEW TAB 500 MG 1000 MG: 500 CHEW TAB at 18:13

## 2024-01-01 RX ADMIN — ROSUVASTATIN CALCIUM 10 MG: 10 TABLET, FILM COATED ORAL at 08:23

## 2024-01-01 RX ADMIN — HYDROMORPHONE HYDROCHLORIDE 0.4 MG: 0.2 INJECTION, SOLUTION INTRAMUSCULAR; INTRAVENOUS; SUBCUTANEOUS at 01:10

## 2024-01-01 RX ADMIN — TACROLIMUS 3.5 MG: 1 CAPSULE ORAL at 19:58

## 2024-01-01 RX ADMIN — SEVELAMER HYDROCHLORIDE 400 MG: 400 TABLET, FILM COATED ORAL at 10:14

## 2024-01-01 RX ADMIN — TRAMADOL HYDROCHLORIDE 100 MG: 50 TABLET, COATED ORAL at 04:44

## 2024-01-01 RX ADMIN — CALCITRIOL CAPSULES 0.25 MCG 0.25 MCG: 0.25 CAPSULE ORAL at 08:36

## 2024-01-01 RX ADMIN — IPRATROPIUM BROMIDE AND ALBUTEROL SULFATE 3 ML: .5; 3 SOLUTION RESPIRATORY (INHALATION) at 21:06

## 2024-01-01 RX ADMIN — ACETAMINOPHEN 975 MG: 325 TABLET, FILM COATED ORAL at 08:05

## 2024-01-01 RX ADMIN — MAGNESIUM SULFATE IN WATER 2 G: 40 INJECTION, SOLUTION INTRAVENOUS at 15:12

## 2024-01-01 RX ADMIN — SODIUM CHLORIDE 250 ML: 9 INJECTION, SOLUTION INTRAVENOUS at 13:50

## 2024-01-01 RX ADMIN — ALBUTEROL SULFATE 2 PUFF: 90 AEROSOL, METERED RESPIRATORY (INHALATION) at 07:42

## 2024-01-01 RX ADMIN — LIDOCAINE HYDROCHLORIDE 100 MG: 20 INJECTION, SOLUTION INFILTRATION; PERINEURAL at 01:24

## 2024-01-01 RX ADMIN — BUPROPION HYDROCHLORIDE 300 MG: 300 TABLET, FILM COATED, EXTENDED RELEASE ORAL at 09:16

## 2024-01-01 RX ADMIN — PREDNISONE 40 MG: 20 TABLET ORAL at 08:35

## 2024-01-01 RX ADMIN — TRAMADOL HYDROCHLORIDE 100 MG: 50 TABLET, COATED ORAL at 12:15

## 2024-01-01 RX ADMIN — MONTELUKAST 10 MG: 10 TABLET, FILM COATED ORAL at 22:31

## 2024-01-01 RX ADMIN — IPRATROPIUM BROMIDE AND ALBUTEROL SULFATE 3 ML: .5; 3 SOLUTION RESPIRATORY (INHALATION) at 22:14

## 2024-01-01 RX ADMIN — TACROLIMUS 3 MG: 1 CAPSULE ORAL at 18:13

## 2024-01-01 RX ADMIN — MYCOPHENOLATE MOFETIL 500 MG: 250 CAPSULE ORAL at 19:59

## 2024-01-01 RX ADMIN — TACROLIMUS 3.5 MG: 1 CAPSULE ORAL at 07:56

## 2024-01-01 RX ADMIN — HYDROXYZINE HYDROCHLORIDE 25 MG: 25 TABLET, FILM COATED ORAL at 22:12

## 2024-01-01 RX ADMIN — HYDROMORPHONE HYDROCHLORIDE 0.2 MG: 0.2 INJECTION, SOLUTION INTRAMUSCULAR; INTRAVENOUS; SUBCUTANEOUS at 15:57

## 2024-01-01 RX ADMIN — WARFARIN SODIUM 5 MG: 5 TABLET ORAL at 19:25

## 2024-01-01 RX ADMIN — GABAPENTIN 300 MG: 300 CAPSULE ORAL at 09:02

## 2024-01-01 RX ADMIN — HYDRALAZINE HYDROCHLORIDE 25 MG: 25 TABLET ORAL at 09:02

## 2024-01-01 RX ADMIN — ASPIRIN 81 MG CHEWABLE TABLET 81 MG: 81 TABLET CHEWABLE at 08:55

## 2024-01-01 RX ADMIN — MONTELUKAST 10 MG: 10 TABLET, FILM COATED ORAL at 20:29

## 2024-01-01 RX ADMIN — HYDRALAZINE HYDROCHLORIDE 25 MG: 25 TABLET ORAL at 12:02

## 2024-01-01 RX ADMIN — KIT FOR THE PREPARATION OF TECHNETIUM TC 99M ALBUMIN AGGREGATED 6.1 MILLICURIE: 2.5 INJECTION, POWDER, FOR SOLUTION INTRAVENOUS at 16:40

## 2024-01-01 RX ADMIN — TRAMADOL HYDROCHLORIDE 50 MG: 50 TABLET, COATED ORAL at 05:15

## 2024-01-01 RX ADMIN — LEVALBUTEROL TARTRATE 2 PUFF: 45 AEROSOL, METERED ORAL at 02:14

## 2024-01-01 RX ADMIN — MYCOPHENOLATE MOFETIL 250 MG: 250 CAPSULE ORAL at 02:33

## 2024-01-01 RX ADMIN — ALBUTEROL SULFATE 2.5 MG: 2.5 SOLUTION RESPIRATORY (INHALATION) at 22:02

## 2024-01-01 RX ADMIN — ROSUVASTATIN CALCIUM 10 MG: 10 TABLET, FILM COATED ORAL at 09:17

## 2024-01-01 RX ADMIN — ACETAMINOPHEN 975 MG: 325 TABLET, FILM COATED ORAL at 15:09

## 2024-01-01 RX ADMIN — GUAIFENESIN 1200 MG: 600 TABLET ORAL at 22:12

## 2024-01-01 RX ADMIN — HYDRALAZINE HYDROCHLORIDE 25 MG: 25 TABLET ORAL at 11:45

## 2024-01-01 RX ADMIN — ALLOPURINOL 300 MG: 300 TABLET ORAL at 09:17

## 2024-01-01 RX ADMIN — TACROLIMUS 3 MG: 1 CAPSULE ORAL at 19:08

## 2024-01-01 RX ADMIN — LEVALBUTEROL TARTRATE 2 PUFF: 45 AEROSOL, METERED ORAL at 20:02

## 2024-01-01 RX ADMIN — ASPIRIN 81 MG CHEWABLE TABLET 81 MG: 81 TABLET CHEWABLE at 09:17

## 2024-01-01 RX ADMIN — TACROLIMUS 3.5 MG: 1 CAPSULE ORAL at 08:08

## 2024-01-01 RX ADMIN — ALLOPURINOL 300 MG: 300 TABLET ORAL at 09:16

## 2024-01-01 RX ADMIN — PIPERACILLIN AND TAZOBACTAM 3.38 G: 3; .375 INJECTION, POWDER, LYOPHILIZED, FOR SOLUTION INTRAVENOUS at 10:09

## 2024-01-01 RX ADMIN — LEVALBUTEROL TARTRATE 2 PUFF: 45 AEROSOL, METERED ORAL at 12:16

## 2024-01-01 RX ADMIN — ALBUTEROL SULFATE 2 PUFF: 90 AEROSOL, METERED RESPIRATORY (INHALATION) at 14:51

## 2024-01-01 RX ADMIN — GADOBUTROL 9 ML: 604.72 INJECTION INTRAVENOUS at 18:51

## 2024-01-01 RX ADMIN — INSULIN ASPART 1 UNITS: 100 INJECTION, SOLUTION INTRAVENOUS; SUBCUTANEOUS at 19:56

## 2024-01-01 RX ADMIN — ONDANSETRON 4 MG: 4 TABLET, ORALLY DISINTEGRATING ORAL at 18:36

## 2024-01-01 RX ADMIN — HYDROMORPHONE HYDROCHLORIDE 0.2 MG: 0.2 INJECTION, SOLUTION INTRAMUSCULAR; INTRAVENOUS; SUBCUTANEOUS at 06:22

## 2024-01-01 RX ADMIN — DIPHENHYDRAMINE HYDROCHLORIDE 25 MG: 50 INJECTION, SOLUTION INTRAMUSCULAR; INTRAVENOUS at 20:26

## 2024-01-01 RX ADMIN — HEPARIN SODIUM 950 UNITS/HR: 10000 INJECTION, SOLUTION INTRAVENOUS at 19:28

## 2024-01-01 RX ADMIN — MYCOPHENOLATE MOFETIL 500 MG: 250 CAPSULE ORAL at 20:29

## 2024-01-01 RX ADMIN — MYCOPHENOLATE MOFETIL 250 MG: 500 INJECTION, POWDER, LYOPHILIZED, FOR SOLUTION INTRAVENOUS at 11:44

## 2024-01-01 RX ADMIN — FENTANYL CITRATE 100 MCG: 50 INJECTION INTRAMUSCULAR; INTRAVENOUS at 02:38

## 2024-01-01 RX ADMIN — MYCOPHENOLATE MOFETIL 500 MG: 250 CAPSULE ORAL at 08:38

## 2024-01-01 RX ADMIN — Medication 10 MG: at 22:39

## 2024-01-01 RX ADMIN — TRAMADOL HYDROCHLORIDE 50 MG: 50 TABLET, COATED ORAL at 22:22

## 2024-01-01 RX ADMIN — MONTELUKAST 10 MG: 10 TABLET, FILM COATED ORAL at 22:14

## 2024-01-01 RX ADMIN — TRAMADOL HYDROCHLORIDE 50 MG: 50 TABLET, COATED ORAL at 18:33

## 2024-01-01 RX ADMIN — FERROUS SULFATE TAB 325 MG (65 MG ELEMENTAL FE) 325 MG: 325 (65 FE) TAB at 17:51

## 2024-01-01 ASSESSMENT — ACTIVITIES OF DAILY LIVING (ADL)
DIFFICULTY_COMMUNICATING: NO
ADLS_ACUITY_SCORE: 38
ADLS_ACUITY_SCORE: 31
ADLS_ACUITY_SCORE: 31
ADLS_ACUITY_SCORE: 22
ADLS_ACUITY_SCORE: 25
ADLS_ACUITY_SCORE: 35
ADLS_ACUITY_SCORE: 25
ADLS_ACUITY_SCORE: 21
ADLS_ACUITY_SCORE: 38
ADLS_ACUITY_SCORE: 22
ADLS_ACUITY_SCORE: 26
ADLS_ACUITY_SCORE: 22
ADLS_ACUITY_SCORE: 31
ADLS_ACUITY_SCORE: 22
ADLS_ACUITY_SCORE: 31
ADLS_ACUITY_SCORE: 31
ADLS_ACUITY_SCORE: 25
ADLS_ACUITY_SCORE: 38
ADLS_ACUITY_SCORE: 23
ADLS_ACUITY_SCORE: 22
TOILETING_ISSUES: NO
ADLS_ACUITY_SCORE: 37
ADLS_ACUITY_SCORE: 37
ADLS_ACUITY_SCORE: 22
ADLS_ACUITY_SCORE: 31
ADLS_ACUITY_SCORE: 22
ADLS_ACUITY_SCORE: 31
ADLS_ACUITY_SCORE: 37
ADLS_ACUITY_SCORE: 37
ADLS_ACUITY_SCORE: 22
ADLS_ACUITY_SCORE: 37
ADLS_ACUITY_SCORE: 22
FALL_HISTORY_WITHIN_LAST_SIX_MONTHS: NO
ADLS_ACUITY_SCORE: 38
ADLS_ACUITY_SCORE: 25
ADLS_ACUITY_SCORE: 22
ADLS_ACUITY_SCORE: 22
PREVIOUS_RESPONSIBILITIES: MEAL PREP;HOUSEKEEPING;LAUNDRY;SHOPPING;YARDWORK;MEDICATION MANAGEMENT;FINANCES;DRIVING;CHILD CARE
ADLS_ACUITY_SCORE: 26
ADLS_ACUITY_SCORE: 38
DOING_ERRANDS_INDEPENDENTLY_DIFFICULTY: NO
ADLS_ACUITY_SCORE: 31
ADLS_ACUITY_SCORE: 23
ADLS_ACUITY_SCORE: 37
ADLS_ACUITY_SCORE: 23
ADLS_ACUITY_SCORE: 31
ADLS_ACUITY_SCORE: 23
ADLS_ACUITY_SCORE: 31
ADLS_ACUITY_SCORE: 23
ADLS_ACUITY_SCORE: 25
ADLS_ACUITY_SCORE: 31
ADLS_ACUITY_SCORE: 25
ADLS_ACUITY_SCORE: 22
ADLS_ACUITY_SCORE: 37
ADLS_ACUITY_SCORE: 31
WEAR_GLASSES_OR_BLIND: NO
ADLS_ACUITY_SCORE: 23
ADLS_ACUITY_SCORE: 22
ADLS_ACUITY_SCORE: 26
ADLS_ACUITY_SCORE: 31
ADLS_ACUITY_SCORE: 22
ADLS_ACUITY_SCORE: 26
ADLS_ACUITY_SCORE: 25
ADLS_ACUITY_SCORE: 25
ADLS_ACUITY_SCORE: 31
DEPENDENT_IADLS:: INDEPENDENT
ADLS_ACUITY_SCORE: 38
ADLS_ACUITY_SCORE: 22
ADLS_ACUITY_SCORE: 22
ADLS_ACUITY_SCORE: 25
ADLS_ACUITY_SCORE: 23
ADLS_ACUITY_SCORE: 25
ADLS_ACUITY_SCORE: 22
ADLS_ACUITY_SCORE: 22
ADLS_ACUITY_SCORE: 37
ADLS_ACUITY_SCORE: 22
ADLS_ACUITY_SCORE: 37
ADLS_ACUITY_SCORE: 23
ADLS_ACUITY_SCORE: 31
ADLS_ACUITY_SCORE: 25
ADLS_ACUITY_SCORE: 23
ADLS_ACUITY_SCORE: 26
ADLS_ACUITY_SCORE: 22
ADLS_ACUITY_SCORE: 22
ADLS_ACUITY_SCORE: 25
DIFFICULTY_EATING/SWALLOWING: NO
ADLS_ACUITY_SCORE: 35
ADLS_ACUITY_SCORE: 26
ADLS_ACUITY_SCORE: 31
ADLS_ACUITY_SCORE: 25
ADLS_ACUITY_SCORE: 24
ADLS_ACUITY_SCORE: 23
ADLS_ACUITY_SCORE: 25
ADLS_ACUITY_SCORE: 25
ADLS_ACUITY_SCORE: 22
ADLS_ACUITY_SCORE: 31
ADLS_ACUITY_SCORE: 22
ADLS_ACUITY_SCORE: 22
ADLS_ACUITY_SCORE: 25
ADLS_ACUITY_SCORE: 22
ADLS_ACUITY_SCORE: 23
ADLS_ACUITY_SCORE: 22
ADLS_ACUITY_SCORE: 38
DEPENDENT_IADLS:: INDEPENDENT
ADLS_ACUITY_SCORE: 23
ADLS_ACUITY_SCORE: 26
ADLS_ACUITY_SCORE: 38
ADLS_ACUITY_SCORE: 23
ADLS_ACUITY_SCORE: 22
ADLS_ACUITY_SCORE: 26
ADLS_ACUITY_SCORE: 37
ADLS_ACUITY_SCORE: 38
ADLS_ACUITY_SCORE: 31
ADLS_ACUITY_SCORE: 22
ADLS_ACUITY_SCORE: 23
ADLS_ACUITY_SCORE: 35
ADLS_ACUITY_SCORE: 26
ADLS_ACUITY_SCORE: 38
ADLS_ACUITY_SCORE: 22
ADLS_ACUITY_SCORE: 25
ADLS_ACUITY_SCORE: 23
ADLS_ACUITY_SCORE: 23
ADLS_ACUITY_SCORE: 31
ADLS_ACUITY_SCORE: 22
ADLS_ACUITY_SCORE: 25
ADLS_ACUITY_SCORE: 23
ADLS_ACUITY_SCORE: 25
ADLS_ACUITY_SCORE: 37
ADLS_ACUITY_SCORE: 23
ADLS_ACUITY_SCORE: 22
ADLS_ACUITY_SCORE: 23
ADLS_ACUITY_SCORE: 31
ADLS_ACUITY_SCORE: 22
ADLS_ACUITY_SCORE: 35
ADLS_ACUITY_SCORE: 22
ADLS_ACUITY_SCORE: 22
ADLS_ACUITY_SCORE: 26
ADLS_ACUITY_SCORE: 31
ADLS_ACUITY_SCORE: 37
ADLS_ACUITY_SCORE: 22
ADLS_ACUITY_SCORE: 22
ADLS_ACUITY_SCORE: 25
ADLS_ACUITY_SCORE: 22
ADLS_ACUITY_SCORE: 22
ADLS_ACUITY_SCORE: 23
ADLS_ACUITY_SCORE: 22
ADLS_ACUITY_SCORE: 22
ADLS_ACUITY_SCORE: 37
ADLS_ACUITY_SCORE: 31
ADLS_ACUITY_SCORE: 22
ADLS_ACUITY_SCORE: 22
ADLS_ACUITY_SCORE: 38
ADLS_ACUITY_SCORE: 25
ADLS_ACUITY_SCORE: 23
ADLS_ACUITY_SCORE: 22
ADLS_ACUITY_SCORE: 25
ADLS_ACUITY_SCORE: 35
ADLS_ACUITY_SCORE: 24
ADLS_ACUITY_SCORE: 23
ADLS_ACUITY_SCORE: 23
ADLS_ACUITY_SCORE: 25
ADLS_ACUITY_SCORE: 23
ADLS_ACUITY_SCORE: 26
ADLS_ACUITY_SCORE: 22
ADLS_ACUITY_SCORE: 23
ADLS_ACUITY_SCORE: 26
ADLS_ACUITY_SCORE: 25
ADLS_ACUITY_SCORE: 25
ADLS_ACUITY_SCORE: 37
ADLS_ACUITY_SCORE: 26
ADLS_ACUITY_SCORE: 25
ADLS_ACUITY_SCORE: 31
CONCENTRATING,_REMEMBERING_OR_MAKING_DECISIONS_DIFFICULTY: NO
ADLS_ACUITY_SCORE: 37
ADLS_ACUITY_SCORE: 25
ADLS_ACUITY_SCORE: 27
ADLS_ACUITY_SCORE: 31
ADLS_ACUITY_SCORE: 22
ADLS_ACUITY_SCORE: 26
ADLS_ACUITY_SCORE: 22
ADLS_ACUITY_SCORE: 24
ADLS_ACUITY_SCORE: 31
ADLS_ACUITY_SCORE: 26
ADLS_ACUITY_SCORE: 31
ADLS_ACUITY_SCORE: 37
ADLS_ACUITY_SCORE: 22
ADLS_ACUITY_SCORE: 22
ADLS_ACUITY_SCORE: 33
ADLS_ACUITY_SCORE: 22
ADLS_ACUITY_SCORE: 37
ADLS_ACUITY_SCORE: 22
ADLS_ACUITY_SCORE: 31
ADLS_ACUITY_SCORE: 22
ADLS_ACUITY_SCORE: 26
ADLS_ACUITY_SCORE: 23
ADLS_ACUITY_SCORE: 26
ADLS_ACUITY_SCORE: 25
ADLS_ACUITY_SCORE: 26
ADLS_ACUITY_SCORE: 22
ADLS_ACUITY_SCORE: 23
ADLS_ACUITY_SCORE: 23
ADLS_ACUITY_SCORE: 26
ADLS_ACUITY_SCORE: 26
ADLS_ACUITY_SCORE: 23
ADLS_ACUITY_SCORE: 31
ADLS_ACUITY_SCORE: 22
ADLS_ACUITY_SCORE: 23
ADLS_ACUITY_SCORE: 22
ADLS_ACUITY_SCORE: 25
ADLS_ACUITY_SCORE: 23
ADLS_ACUITY_SCORE: 26
ADLS_ACUITY_SCORE: 25
ADLS_ACUITY_SCORE: 23
ADLS_ACUITY_SCORE: 22
ADLS_ACUITY_SCORE: 25
ADLS_ACUITY_SCORE: 23
ADLS_ACUITY_SCORE: 26
ADLS_ACUITY_SCORE: 23
ADLS_ACUITY_SCORE: 25
ADLS_ACUITY_SCORE: 23
ADLS_ACUITY_SCORE: 31
ADLS_ACUITY_SCORE: 22
ADLS_ACUITY_SCORE: 25
ADLS_ACUITY_SCORE: 26
ADLS_ACUITY_SCORE: 22
ADLS_ACUITY_SCORE: 37
ADLS_ACUITY_SCORE: 22
ADLS_ACUITY_SCORE: 31
ADLS_ACUITY_SCORE: 38
ADLS_ACUITY_SCORE: 26
ADLS_ACUITY_SCORE: 22
ADLS_ACUITY_SCORE: 31
ADLS_ACUITY_SCORE: 25
ADLS_ACUITY_SCORE: 26
ADLS_ACUITY_SCORE: 22
ADLS_ACUITY_SCORE: 25
ADLS_ACUITY_SCORE: 23
ADLS_ACUITY_SCORE: 22
ADLS_ACUITY_SCORE: 22
ADLS_ACUITY_SCORE: 37
ADLS_ACUITY_SCORE: 38
ADLS_ACUITY_SCORE: 23
ADLS_ACUITY_SCORE: 31
ADLS_ACUITY_SCORE: 23
ADLS_ACUITY_SCORE: 23
ADLS_ACUITY_SCORE: 22
ADLS_ACUITY_SCORE: 22
ADLS_ACUITY_SCORE: 35
ADLS_ACUITY_SCORE: 22
ADLS_ACUITY_SCORE: 26
ADLS_ACUITY_SCORE: 22
ADLS_ACUITY_SCORE: 22
ADLS_ACUITY_SCORE: 23
ADLS_ACUITY_SCORE: 25
ADLS_ACUITY_SCORE: 26
ADLS_ACUITY_SCORE: 38
ADLS_ACUITY_SCORE: 38
ADLS_ACUITY_SCORE: 22
ADLS_ACUITY_SCORE: 38
ADLS_ACUITY_SCORE: 26
ADLS_ACUITY_SCORE: 38
ADLS_ACUITY_SCORE: 23
ADLS_ACUITY_SCORE: 22
ADLS_ACUITY_SCORE: 37
DRESSING/BATHING_DIFFICULTY: NO
ADLS_ACUITY_SCORE: 22
ADLS_ACUITY_SCORE: 26
ADLS_ACUITY_SCORE: 22
ADLS_ACUITY_SCORE: 23
ADLS_ACUITY_SCORE: 38
ADLS_ACUITY_SCORE: 23
ADLS_ACUITY_SCORE: 25
ADLS_ACUITY_SCORE: 22
ADLS_ACUITY_SCORE: 37
ADLS_ACUITY_SCORE: 31
ADLS_ACUITY_SCORE: 22
ADLS_ACUITY_SCORE: 26
ADLS_ACUITY_SCORE: 35
ADLS_ACUITY_SCORE: 23
ADLS_ACUITY_SCORE: 23
ADLS_ACUITY_SCORE: 22
ADLS_ACUITY_SCORE: 23
ADLS_ACUITY_SCORE: 31
ADLS_ACUITY_SCORE: 26
ADLS_ACUITY_SCORE: 22
ADLS_ACUITY_SCORE: 37
ADLS_ACUITY_SCORE: 37
ADLS_ACUITY_SCORE: 25
ADLS_ACUITY_SCORE: 22
ADLS_ACUITY_SCORE: 37
ADLS_ACUITY_SCORE: 37
ADLS_ACUITY_SCORE: 23
ADLS_ACUITY_SCORE: 38
ADLS_ACUITY_SCORE: 31
ADLS_ACUITY_SCORE: 37
ADLS_ACUITY_SCORE: 31
ADLS_ACUITY_SCORE: 31
ADLS_ACUITY_SCORE: 23
ADLS_ACUITY_SCORE: 23
ADLS_ACUITY_SCORE: 37
ADLS_ACUITY_SCORE: 23
ADLS_ACUITY_SCORE: 22
ADLS_ACUITY_SCORE: 23
ADLS_ACUITY_SCORE: 31
ADLS_ACUITY_SCORE: 22
ADLS_ACUITY_SCORE: 23
ADLS_ACUITY_SCORE: 26
ADLS_ACUITY_SCORE: 26
ADLS_ACUITY_SCORE: 23
DEPENDENT_IADLS:: INDEPENDENT
ADLS_ACUITY_SCORE: 25
ADLS_ACUITY_SCORE: 22
ADLS_ACUITY_SCORE: 26
ADLS_ACUITY_SCORE: 23
ADLS_ACUITY_SCORE: 31
ADLS_ACUITY_SCORE: 22
ADLS_ACUITY_SCORE: 22
ADLS_ACUITY_SCORE: 24
ADLS_ACUITY_SCORE: 22
ADLS_ACUITY_SCORE: 23
IADL_COMMENTS: SHARES WITH WIFE
CHANGE_IN_FUNCTIONAL_STATUS_SINCE_ONSET_OF_CURRENT_ILLNESS/INJURY: NO
ADLS_ACUITY_SCORE: 25
ADLS_ACUITY_SCORE: 25
ADLS_ACUITY_SCORE: 22
ADLS_ACUITY_SCORE: 23
ADLS_ACUITY_SCORE: 22
ADLS_ACUITY_SCORE: 23
ADLS_ACUITY_SCORE: 25
ADLS_ACUITY_SCORE: 22
ADLS_ACUITY_SCORE: 22
ADLS_ACUITY_SCORE: 25
ADLS_ACUITY_SCORE: 22
ADLS_ACUITY_SCORE: 25
ADLS_ACUITY_SCORE: 23
ADLS_ACUITY_SCORE: 25
ADLS_ACUITY_SCORE: 22
ADLS_ACUITY_SCORE: 31
ADLS_ACUITY_SCORE: 31
ADLS_ACUITY_SCORE: 35
ADLS_ACUITY_SCORE: 22
ADLS_ACUITY_SCORE: 23
ADLS_ACUITY_SCORE: 22
ADLS_ACUITY_SCORE: 22
ADLS_ACUITY_SCORE: 23
ADLS_ACUITY_SCORE: 25
ADLS_ACUITY_SCORE: 26
ADLS_ACUITY_SCORE: 37
WALKING_OR_CLIMBING_STAIRS_DIFFICULTY: NO
ADLS_ACUITY_SCORE: 22
ADLS_ACUITY_SCORE: 31
ADLS_ACUITY_SCORE: 25
ADLS_ACUITY_SCORE: 23
ADLS_ACUITY_SCORE: 22
HEARING_DIFFICULTY_OR_DEAF: NO
ADLS_ACUITY_SCORE: 22
ADLS_ACUITY_SCORE: 23
ADLS_ACUITY_SCORE: 21
ADLS_ACUITY_SCORE: 22
ADLS_ACUITY_SCORE: 22
ADLS_ACUITY_SCORE: 23
ADLS_ACUITY_SCORE: 26
ADLS_ACUITY_SCORE: 22
ADLS_ACUITY_SCORE: 25
ADLS_ACUITY_SCORE: 38
ADLS_ACUITY_SCORE: 31
ADLS_ACUITY_SCORE: 25
ADLS_ACUITY_SCORE: 22
ADLS_ACUITY_SCORE: 22
ADLS_ACUITY_SCORE: 31
ADLS_ACUITY_SCORE: 23
ADLS_ACUITY_SCORE: 22
ADLS_ACUITY_SCORE: 23
ADLS_ACUITY_SCORE: 31
ADLS_ACUITY_SCORE: 22
ADLS_ACUITY_SCORE: 23
ADLS_ACUITY_SCORE: 22
ADLS_ACUITY_SCORE: 35
ADLS_ACUITY_SCORE: 23
ADLS_ACUITY_SCORE: 35
ADLS_ACUITY_SCORE: 24
ADLS_ACUITY_SCORE: 25
ADLS_ACUITY_SCORE: 26
ADLS_ACUITY_SCORE: 22
ADLS_ACUITY_SCORE: 22
ADLS_ACUITY_SCORE: 38
ADLS_ACUITY_SCORE: 25
ADLS_ACUITY_SCORE: 22
ADLS_ACUITY_SCORE: 23
ADLS_ACUITY_SCORE: 31
ADLS_ACUITY_SCORE: 23
ADLS_ACUITY_SCORE: 22
ADLS_ACUITY_SCORE: 26
ADLS_ACUITY_SCORE: 22
ADLS_ACUITY_SCORE: 31
ADLS_ACUITY_SCORE: 22
ADLS_ACUITY_SCORE: 25
ADLS_ACUITY_SCORE: 22
ADLS_ACUITY_SCORE: 22

## 2024-01-01 ASSESSMENT — PAIN SCALES - GENERAL
PAINLEVEL: MILD PAIN (3)
PAINLEVEL: NO PAIN (0)
PAINLEVEL: MILD PAIN (3)
PAINLEVEL: MILD PAIN (3)
PAINLEVEL: NO PAIN (0)
PAINLEVEL: MODERATE PAIN (4)
PAINLEVEL: NO PAIN (0)
PAINLEVEL: MODERATE PAIN (4)

## 2024-01-01 ASSESSMENT — PATIENT HEALTH QUESTIONNAIRE - PHQ9
SUM OF ALL RESPONSES TO PHQ QUESTIONS 1-9: 3
SUM OF ALL RESPONSES TO PHQ QUESTIONS 1-9: 4
SUM OF ALL RESPONSES TO PHQ QUESTIONS 1-9: 4
10. IF YOU CHECKED OFF ANY PROBLEMS, HOW DIFFICULT HAVE THESE PROBLEMS MADE IT FOR YOU TO DO YOUR WORK, TAKE CARE OF THINGS AT HOME, OR GET ALONG WITH OTHER PEOPLE: SOMEWHAT DIFFICULT

## 2024-01-01 ASSESSMENT — ANXIETY QUESTIONNAIRES
1. FEELING NERVOUS, ANXIOUS, OR ON EDGE: SEVERAL DAYS
4. TROUBLE RELAXING: SEVERAL DAYS
GAD7 TOTAL SCORE: 5
IF YOU CHECKED OFF ANY PROBLEMS ON THIS QUESTIONNAIRE, HOW DIFFICULT HAVE THESE PROBLEMS MADE IT FOR YOU TO DO YOUR WORK, TAKE CARE OF THINGS AT HOME, OR GET ALONG WITH OTHER PEOPLE: SOMEWHAT DIFFICULT
2. NOT BEING ABLE TO STOP OR CONTROL WORRYING: SEVERAL DAYS
3. WORRYING TOO MUCH ABOUT DIFFERENT THINGS: SEVERAL DAYS
8. IF YOU CHECKED OFF ANY PROBLEMS, HOW DIFFICULT HAVE THESE MADE IT FOR YOU TO DO YOUR WORK, TAKE CARE OF THINGS AT HOME, OR GET ALONG WITH OTHER PEOPLE?: SOMEWHAT DIFFICULT
5. BEING SO RESTLESS THAT IT IS HARD TO SIT STILL: NOT AT ALL
7. FEELING AFRAID AS IF SOMETHING AWFUL MIGHT HAPPEN: NOT AT ALL
GAD7 TOTAL SCORE: 5
6. BECOMING EASILY ANNOYED OR IRRITABLE: SEVERAL DAYS
7. FEELING AFRAID AS IF SOMETHING AWFUL MIGHT HAPPEN: NOT AT ALL

## 2024-01-01 ASSESSMENT — COLUMBIA-SUICIDE SEVERITY RATING SCALE - C-SSRS
2. HAVE YOU ACTUALLY HAD ANY THOUGHTS OF KILLING YOURSELF IN THE PAST MONTH?: NO
6. HAVE YOU EVER DONE ANYTHING, STARTED TO DO ANYTHING, OR PREPARED TO DO ANYTHING TO END YOUR LIFE?: NO
2. HAVE YOU ACTUALLY HAD ANY THOUGHTS OF KILLING YOURSELF IN THE PAST MONTH?: NO
1. IN THE PAST MONTH, HAVE YOU WISHED YOU WERE DEAD OR WISHED YOU COULD GO TO SLEEP AND NOT WAKE UP?: NO
1. IN THE PAST MONTH, HAVE YOU WISHED YOU WERE DEAD OR WISHED YOU COULD GO TO SLEEP AND NOT WAKE UP?: NO
6. HAVE YOU EVER DONE ANYTHING, STARTED TO DO ANYTHING, OR PREPARED TO DO ANYTHING TO END YOUR LIFE?: NO

## 2024-01-01 ASSESSMENT — COPD QUESTIONNAIRES: COPD: 1

## 2024-01-01 ASSESSMENT — ENCOUNTER SYMPTOMS
DYSRHYTHMIAS: 1
NEW SYMPTOMS OF CORONARY ARTERY DISEASE: 0
NEW SYMPTOMS OF CORONARY ARTERY DISEASE: 0

## 2024-01-01 ASSESSMENT — VISUAL ACUITY
OU: NORMAL ACUITY
OU: NORMAL ACUITY

## 2024-01-03 PROBLEM — K56.609 SMALL BOWEL OBSTRUCTION (H): Status: ACTIVE | Noted: 2024-01-01

## 2024-01-03 NOTE — ED TRIAGE NOTES
Pt BIBA with c/o abdominal pain. Per EMS pt having 10/10 abdominal pain starting yesterday evening. Pt given Zofran & fentanyl in route with good relief.

## 2024-01-03 NOTE — ED PROVIDER NOTES
"ED Provider Note  [facility]      History     Chief Complaint   Patient presents with     Abdominal Pain     HPI  Jim Willingham is a 68 year old male with complex past medical history including history of Gastric bypass, COPD history, history of heart transplant with on immunosuppression tacrolimus mycophenolate history of NSTEMI, history of idiopathic cardiomyopathy who presents to the emergency department tonight with concerns for abdominal pain.  Patient presents alone via EMS.    He states that starting around 11 PM yesterday, shortly after eating some salami, he start developing upper abdominal pain.  Patient states that he is continue to have the pain, and it is associated with some vomiting.  It is constant although does wax and wane in severity.  Patient notes no associated fevers, rhinorrhea.  He states he was diagnosed with bronchitis and has had improvement with his symptoms over the last 1 week.  No dyspnea or chest pain.  No hematemesis coffee-ground emesis black or bloody stools.  Abdominal history significant for Sylvester-en-Y bypass without other abdominal surgeries.  No history of nephrolithiasis, no urinary symptoms.  He notes over the last few months he has had similar symptoms that are postprandial although typically they resolve on their own.  Today symptoms are constant.  He notes he occasionally drinks alcohol had a beer a few days ago for a football game does not drink heavily.             Physical Exam   BP: 123/75  Pulse: 110  Temp: 98.3  F (36.8  C)  Resp: 15  Height: 172.7 cm (5' 8\")  Weight: 83.9 kg (185 lb)  SpO2: 99 %  Physical Exam      GENERAL APPEARANCE: The patient is well developed, well appearing, and in no acute distress.  HEAD:  Normocephalic and atraumatic.   EENT: Voice normal.  NECK: Trachea is midline.  LUNGS: Breath sounds are equal and clear bilaterally. No wheezes, rhonchi, or rales.  HEART: Regular rate and normal rhythm.  Radial pulses 2+ " bilaterally.  ABDOMEN: Winn sign positive.  Patient also has epigastric tenderness.  No lower abdominal tenderness.  No rebound tenderness.  EXTREMITIES: No cyanosis, clubbing, or edema.  NEUROLOGIC: No focal sensory or motor deficits are noted.  PSYCHIATRIC: The patient is awake, alert.  Appropriate mood and affect.  SKIN: Warm, dry, and well perfused. Good turgor.    ED Course, Procedures, & Data         Results for orders placed or performed during the hospital encounter of 01/03/24   US Abdomen Limited     Status: None    Narrative    EXAMINATION: US ABDOMEN LIMITED, 1/3/2024 2:08 PM    COMPARISON: CT abdomen 7/23/2021.    HISTORY: Right upper quadrant pain, positive Winn sign on exam,  history gallstones, evaluate cholecystitis    TECHNIQUE: The abdomen was scanned in standard fashion with  specialized ultrasound transducer(s) using both grey scale and limited  color Doppler techniques.    FINDINGS:   Fluid: Trace perihepatic ascites. No pleural effusions.    Liver: The liver demonstrates normal echotexture, measuring 17 cm in  craniocaudal dimension. There is no focal mass. The hepatic veins and  IVC are dilated.    Gallbladder: Cholelithiasis. There is no wall thickening,  pericholecystic fluid or positive sonographic Winn's sign. The  gallbladder wall measures 2 mm.    Bile Ducts: Both the intra- and extrahepatic biliary system are of  normal caliber. The common bile duct measures 3 mm in diameter.    Pancreas: Visualized portions of the head and body of the pancreas are  unremarkable.     Kidney: The right kidney measures 10.2 cm long. There is no  hydronephrosis or hydroureter, no shadowing renal calculi, cystic  lesion or mass.       Impression    IMPRESSION:   1.  Cholelithiasis without sonographic evidence of cholecystitis.  2.  Trace ascites.  3.  Mild hepatomegaly with dilated IVC and hepatic veins maybe  suggestive of hepatic venous congestion.    JOANNE MCMAHAN MD         SYSTEM ID:  [id number]    CT Abdomen Pelvis w/o Contrast     Status: None (Preliminary result)    Impression    RESIDENT PRELIMINARY INTERPRETATION  IMPRESSION:   1.  Postsurgical changes of Sylvester-en-Y gastric bypass with multiple  dilated loops of small bowel throughout the left hemiabdomen with  circumferential wall thickening. The dilated loops of small bowel are  likely contiguous with the alimentary limb. The transition point is  somewhat difficult to delineate with the abdomen due to lack of  intravenous contrast, but these findings are favored to represent  small bowel obstruction. No definite pneumatosis or portal venous gas.  2.  Trace abdominal ascites.  3.  Mild bronchiectasis and subpleural reticular pulmonary opacities  and right lower lobe, may represent infection/information. Recommend  continued attention on follow-up.  4.  Cholelithiasis.    [Urgent Result: Bowel obstruction]    Finding was identified on 1/3/2024 5:37 PM.     Tiffany Zurita PA-C was contacted by Dr. Simon at 1/3/2024 5:49  PM and verbalized understanding of the urgent finding.    Comprehensive metabolic panel     Status: Abnormal   Result Value Ref Range    Sodium 138 135 - 145 mmol/L    Potassium 4.7 3.4 - 5.3 mmol/L    Carbon Dioxide (CO2) 20 (L) 22 - 29 mmol/L    Anion Gap 11 7 - 15 mmol/L    Urea Nitrogen 30.1 (H) 8.0 - 23.0 mg/dL    Creatinine 1.75 (H) 0.67 - 1.17 mg/dL    GFR Estimate 42 (L) >60 mL/min/1.73m2    Calcium 7.6 (L) 8.8 - 10.2 mg/dL    Chloride 107 98 - 107 mmol/L    Glucose 107 (H) 70 - 99 mg/dL    Alkaline Phosphatase 137 40 - 150 U/L    AST 32 0 - 45 U/L    ALT 31 0 - 70 U/L    Protein Total 6.0 (L) 6.4 - 8.3 g/dL    Albumin 4.2 3.5 - 5.2 g/dL    Bilirubin Total 0.7 <=1.2 mg/dL   Lipase     Status: Normal   Result Value Ref Range    Lipase 19 13 - 60 U/L   UA with Microscopic reflex to Culture     Status: Abnormal    Specimen: Urine, NOS   Result Value Ref Range    Color Urine Yellow Colorless, Straw, Light Yellow, Yellow     Appearance Urine Clear Clear    Glucose Urine Negative Negative mg/dL    Bilirubin Urine Negative Negative    Ketones Urine Negative Negative mg/dL    Specific Gravity Urine 1.021 1.003 - 1.035    Blood Urine Negative Negative    pH Urine 7.0 5.0 - 7.0    Protein Albumin Urine 30 (A) Negative mg/dL    Urobilinogen Urine Normal Normal, 2.0 mg/dL    Nitrite Urine Negative Negative    Leukocyte Esterase Urine Negative Negative    RBC Urine 0 <=2 /HPF    WBC Urine <1 <=5 /HPF    Narrative    Urine Culture not indicated   CBC with platelets and differential     Status: Abnormal   Result Value Ref Range    WBC Count 9.7 4.0 - 11.0 10e3/uL    RBC Count 3.95 (L) 4.40 - 5.90 10e6/uL    Hemoglobin 12.4 (L) 13.3 - 17.7 g/dL    Hematocrit 39.3 (L) 40.0 - 53.0 %     78 - 100 fL    MCH 31.4 26.5 - 33.0 pg    MCHC 31.6 31.5 - 36.5 g/dL    RDW 13.3 10.0 - 15.0 %    Platelet Count 233 150 - 450 10e3/uL    % Neutrophils 81 %    % Lymphocytes 10 %    % Monocytes 6 %    % Eosinophils 2 %    % Basophils 1 %    % Immature Granulocytes 0 %    NRBCs per 100 WBC 0 <1 /100    Absolute Neutrophils 7.9 1.6 - 8.3 10e3/uL    Absolute Lymphocytes 1.0 0.8 - 5.3 10e3/uL    Absolute Monocytes 0.5 0.0 - 1.3 10e3/uL    Absolute Eosinophils 0.2 0.0 - 0.7 10e3/uL    Absolute Basophils 0.1 0.0 - 0.2 10e3/uL    Absolute Immature Granulocytes 0.0 <=0.4 10e3/uL    Absolute NRBCs 0.0 10e3/uL   [place]arinda Draw     Status: None    Narrative    The following orders were created for panel order [place] Draw.  Procedure                               Abnormality         Status                     ---------                               -----------         ------                     Extra Blue Top Tube[[id number]]                              Final result               Extra Red Top Tube[[id number]]                               Final result                 Please view results for these tests on the individual orders.   Extra Blue Top Tube     Status: None    Result Value Ref Range    Hold Specimen JI    Extra Red Top Tube     Status: None   Result Value Ref Range    Hold Specimen JI    CBC with platelets differential     Status: Abnormal    Narrative    The following orders were created for panel order CBC with platelets differential.  Procedure                               Abnormality         Status                     ---------                               -----------         ------                     CBC with platelets and d...[[id number]]  Abnormal            Final result                 Please view results for these tests on the individual orders.     Medications   sodium chloride 0.9% BOLUS 500 mL (0 mLs Intravenous Stopped 1/3/24 1443)   ondansetron (ZOFRAN) injection 4 mg (4 mg Intravenous $Given 1/3/24 1447)   oxyCODONE (ROXICODONE) tablet 5 mg (5 mg Oral $Given 1/3/24 1446)   HYDROmorphone (PF) (DILAUDID) injection 0.5 mg (0.5 mg Intravenous $Given 1/3/24 1817)   ondansetron (ZOFRAN) injection 4 mg (4 mg Intravenous $Given 1/3/24 1817)   HYDROmorphone (PF) (DILAUDID) injection 0.5 mg (0.5 mg Intravenous $Given 1/3/24 2005)   pantoprazole (PROTONIX) IV push injection 40 mg (40 mg Intravenous $Given 1/3/24 2052)     Labs Ordered and Resulted from Time of ED Arrival to Time of ED Departure   COMPREHENSIVE METABOLIC PANEL - Abnormal       Result Value    Sodium 138      Potassium 4.7      Carbon Dioxide (CO2) 20 (*)     Anion Gap 11      Urea Nitrogen 30.1 (*)     Creatinine 1.75 (*)     GFR Estimate 42 (*)     Calcium 7.6 (*)     Chloride 107      Glucose 107 (*)     Alkaline Phosphatase 137      AST 32      ALT 31      Protein Total 6.0 (*)     Albumin 4.2      Bilirubin Total 0.7     ROUTINE UA WITH MICROSCOPIC REFLEX TO CULTURE - Abnormal    Color Urine Yellow      Appearance Urine Clear      Glucose Urine Negative      Bilirubin Urine Negative      Ketones Urine Negative      Specific Gravity Urine 1.021      Blood Urine Negative      pH Urine 7.0       Protein Albumin Urine 30 (*)     Urobilinogen Urine Normal      Nitrite Urine Negative      Leukocyte Esterase Urine Negative      RBC Urine 0      WBC Urine <1     CBC WITH PLATELETS AND DIFFERENTIAL - Abnormal    WBC Count 9.7      RBC Count 3.95 (*)     Hemoglobin 12.4 (*)     Hematocrit 39.3 (*)           MCH 31.4      MCHC 31.6      RDW 13.3      Platelet Count 233      % Neutrophils 81      % Lymphocytes 10      % Monocytes 6      % Eosinophils 2      % Basophils 1      % Immature Granulocytes 0      NRBCs per 100 WBC 0      Absolute Neutrophils 7.9      Absolute Lymphocytes 1.0      Absolute Monocytes 0.5      Absolute Eosinophils 0.2      Absolute Basophils 0.1      Absolute Immature Granulocytes 0.0      Absolute NRBCs 0.0     LIPASE - Normal    Lipase 19       CT Abdomen Pelvis w/o Contrast   Preliminary Result   RESIDENT PRELIMINARY INTERPRETATION   IMPRESSION:    1.  Postsurgical changes of Sylvester-en-Y gastric bypass with multiple   dilated loops of small bowel throughout the left hemiabdomen with   circumferential wall thickening. The dilated loops of small bowel are   likely contiguous with the alimentary limb. The transition point is   somewhat difficult to delineate with the abdomen due to lack of   intravenous contrast, but these findings are favored to represent   small bowel obstruction. No definite pneumatosis or portal venous gas.   2.  Trace abdominal ascites.   3.  Mild bronchiectasis and subpleural reticular pulmonary opacities   and right lower lobe, may represent infection/information. Recommend   continued attention on follow-up.   4.  Cholelithiasis.      [Urgent Result: Bowel obstruction]      Finding was identified on 1/3/2024 5:37 PM.       Tiffany Zurita PA-C was contacted by Dr. Simon at 1/3/2024 5:49   PM and verbalized understanding of the urgent finding.       US Abdomen Limited   Final Result   IMPRESSION:    1.  Cholelithiasis without sonographic evidence of  cholecystitis.   2.  Trace ascites.   3.  Mild hepatomegaly with dilated IVC and hepatic veins maybe   suggestive of hepatic venous congestion.      JOANNE MCMAHAN MD            SYSTEM ID:  [id number]             Critical care was not performed.     Medical Decision Making  The patient's presentation was of high complexity (an acute health issue posing potential threat to life or bodily function).    The patient's evaluation involved:  ordering and/or review of 3+ test(s) in this encounter (see separate area of note for details)  discussion of management or test interpretation with another health professional (hospitalist, cardiology, general surgery)    The patient's management necessitated high risk (a parenteral controlled substance) and high risk (a decision regarding hospitalization).    Assessment & Plan    This is a 68-year-old male with history of heart transplant Sylvester-en-Y bypass presenting with concerns for upper abdominal pain since last night.  On presentation vital signs show no fever.  Patient is tachycardic rate 110.  On exam he has focal tenderness to palpation of the upper abdomen including positive Winn sign without rebound tenderness on my exam.  Differential diagnosis includes possibility of cholecystitis symptomatic cholelithiasis pancreatitis bowel obstruction, Sylvester-en-Y bypass complication nephrolithiasis UTI, amongst others.  With broad differential initiate broad workup including screening labs initial right upper quadrant ultrasound to further evaluate possibility of cholecystitis with patient's history of known gallstones.  Patient given Zofran and oxycodone while waiting in the room as the emergency department has no available beds with monitoring at this time due to capacity issues.    CBC without leukocytosis or anemia, hemoglobin 12.4 here, chemistry reviewed shows creatinine 1.75 similar to prior with normal electrolytes, LFTs within reference range, lipase with evidence within  reference range suggesting against pancreatitis.  UA without findings suggest hematuria or infection.  Initial right upper quadrant ultrasound shows gallstones without findings suggestive of cholecystitis.  With no findings suggestive of cholecystitis without white count or sonographic findings and workup broadened to include CT noncontrast abdomen pelvis will defer contrast with patient's CKD.    I was called by radiologist with results showing findings for multiple dilated small bowel loops throughout the left hemiabdomen with concerns for small bowel obstruction and transition point noted with trace abdominal ascites, mild bronchiectasis and cholelithiasis.  With these findings I discussed case with general surgery.  They evaluated the patient and feel patient at this time should be n.p.o. status, will hold off with NG tube unless patient were to become s significantly nauseous or have episodes of vomiting.  They recommend PPI patient was given dose of Protonix here.  Patient be admitted to the general surgery service.  Report given to the hospitalist.  I also spoke with Dr. Carrera, cardiologist and made her aware that patient will be admitted.    Patient seen and discussed with attending physician , who agrees with my plan of care.    I have reviewed the nursing notes. I have reviewed the findings, diagnosis, plan and need for follow up with the patient.    New Prescriptions    No medications on file       Final diagnoses:   Small bowel obstruction (H)   H/O gastric bypass   S/P orthotopic heart transplant (H)       Tiffany Zurita [facility] EMERGENCY DEPARTMENT  1/3/2024    --    ED Attending Physician Attestation    I Robby Logan MD, cared for this patient with the Advanced Practice Provider (AFSANEH). I have performed a history and physical examination of the patient independent of the AFSANEH. I reviewed the AFSANEH's documentation above and agree with the documented findings and plan  of care. I personally provided a substantive portion of the care for this patient, including the complete Medical Decision Making. Please see the AFSANEH's documentation for full details.    Summary of HPI, PE, ED Course   Patient is a 68 year old male evaluated in the emergency department for abdominal pain eval. Exam and ED course notable for ct finding of sbo with transition with surgery consult and plan for admit to [facility] with cards aware also. After the completion of care in the emergency department, the patient was admitted to inpatient.          Robby Logan MD  Emergency Medicine     This note was created at least in part by the use of dragon voice dictation system. Inadvertent typographical errors may still exist.  Robby Logan MD.    Patient evaluated in the emergency department during the COVID-19 pandemic period. Careful attention to patients safety was addressed throughout the evaluation. Evaluation and treatment management was initiated with disposition made efficiently and appropriate as possible to minimize any risk of potential exposure to patient during this evaluation.         Robby Logan MD  01/03/24 2342       Robby Logan MD  01/03/24 0928

## 2024-01-04 PROBLEM — Z98.84 H/O GASTRIC BYPASS: Status: ACTIVE | Noted: 2017-05-11

## 2024-01-04 PROBLEM — Z94.1 S/P ORTHOTOPIC HEART TRANSPLANT (H): Status: ACTIVE | Noted: 2021-02-05

## 2024-01-04 NOTE — PHARMACY-ADMISSION MEDICATION HISTORY
Pharmacy Intern Admission Medication History    Admission medication history is complete. The information provided in this note is only as accurate as the sources available at the time of the update.    Information Source(s): Patient, Family member, and CareEverywhere/SureScripts via phone    Pertinent Information: Spoke with patient and patient's spouse who were knowledgeable about the patient's meds.    Changes made to PTA medication list:  Added: per patient and patient's spouse  Vitamin B12  Deleted: per patient no longer taking  Anakinra 100 mg/0.67 mL injection (from 2022)  Azithromycin 250 mg tablet (completed 5 day course in November)  Calcium citrate-Vitamin D  Changed:   Benadryl: added 'as needed for itching'  Duoneb: added 'as needed for shortness of breath, wheezing or cough'  Tacrolimus 0.5 mg capsule: morning --> evening  Tacrolimus 1 mg capsule: total of 3.5 mg in AM and 3 mg in PM --> 3 mg in AM and 3.5 mg in PM  Tramadol: added 'as needed for pain'         Allergies reviewed with patient and updates made in EHR: yes    Medication History Completed By: Ashley Currie 1/4/2024 5:24 PM    PTA Med List   Medication Sig Last Dose    acetaminophen (TYLENOL) 325 MG tablet Take 3 tablets (975 mg) by mouth every 6 hours as needed for other (For optimal non-opioid multimodal pain management to improve pain control.) Past Week    albuterol (PROAIR HFA/PROVENTIL HFA/VENTOLIN HFA) 108 (90 Base) MCG/ACT inhaler Inhale 2 puffs into the lungs every 4 hours as needed for shortness of breath, wheezing or cough 1/2/2024 at PM    allopurinol (ZYLOPRIM) 300 MG tablet TAKE 1 TABLET BY MOUTH DAILY 1/2/2024 at AM    aspirin (ASA) 81 MG chewable tablet Take 1 tablet (81 mg) by mouth daily 1/2/2024 at AM    buPROPion (WELLBUTRIN) 75 MG tablet TAKE 1 TABLET(75 MG) BY MOUTH TWICE DAILY 1/2/2024 at PM    cyanocobalamin (VITAMIN B-12) 1000 MCG tablet Take 1,000 mcg by mouth daily Past Week    diphenhydrAMINE (BENADRYL) 25 MG  tablet Take 25 mg by mouth every 6 hours as needed for itching Past Week    gabapentin (NEURONTIN) 300 MG capsule TAKE ONE CAPSULE BY MOUTH TWICE A DAY 1/2/2024 at PM    ipratropium - albuterol 0.5 mg/2.5 mg/3 mL (DUONEB) 0.5-2.5 (3) MG/3ML neb solution INHALE CONTENTS OF 1 VIAL USING NEBULIZER FOUR TIMES DAILY AS DIRECTED for 90 (Patient taking differently: Take 1 vial by nebulization every 4 hours as needed for shortness of breath, wheezing or cough) Past Month    Melatonin 10 MG CAPS Take 1 capsule by mouth At Bedtime 1/2/2024 at PM    montelukast (SINGULAIR) 10 MG tablet TAKE 1 TABLET BY MOUTH AT  BEDTIME 1/2/2024 at PM    mycophenolate (GENERIC EQUIVALENT) 250 MG capsule TAKE one CAPSULE BY MOUTH TWICE A DAY 1/2/2024 at PM    rosuvastatin (CRESTOR) 10 MG tablet TAKE ONE TABLET BY MOUTH EVERY DAY 1/2/2024 at AM    tacrolimus (GENERIC) 0.5 MG capsule TAKE 1 CAPSULE BY MOUTH WITH 3  CAPSULES OF TACROLIMUS 1MG IN  THE MORNING FOR A TOTAL OF 3.5MG IN THE MORNING (Patient taking differently: TAKE 1 CAPSULE BY MOUTH WITH 3  CAPSULES OF TACROLIMUS 1MG IN  THE EVENING FOR A TOTAL OF 3.5MG IN THE EVENING) 1/2/2024 at PM    tacrolimus (GENERIC) 1 MG capsule TAKE 3 CAPSULES BY MOUTH WITH 1  TACROLIMUS 0.5MG IN THE MORNING  AND 3 CAPSULES IN THE EVENING  FOR TOTAL 3.5MG IN THE MORNING,  3MG IN THE EVENING (Patient taking differently: TAKE 3 CAPSULES BY MOUTH  IN THE MORNING  AND 3 CAPSULES IN THE EVENING  WITH 1  TACROLIMUS 0.5 MG FOR TOTAL 3 MG IN THE MORNING,  3.5 MG IN THE EVENING) 1/2/2024 at PM    traMADol (ULTRAM) 50 MG tablet TAKE ONE TABLET BY MOUTH EVERY MORNING AND AFTERNOON AND 2 AT BEDTIME (Patient taking differently: TAKE ONE TABLET BY MOUTH EVERY MORNING AND AFTERNOON AND 2 AT BEDTIME AS NEEDED FOR PAIN) Past Week

## 2024-01-04 NOTE — CONSULTS
[facility]    Consult Note - Bariatric Surgery Service  Date of Admission:  1/3/2024  Consult Requested by: ED  Reason for Consult: SBO    Assessment & Plan: Surgery   Jim Willingham is a 68 year old male w/ PMHx significant for  laparoscopic Sylvester en y gastric bypass w/ right fascia maira autograft reinforcement of gastrojejunostomy in 2003, HTN, Afib, VT, HFrEF, NICM s/p dual chamber ICD in 2008 f/b LVAD in 2017 f/b heart transplant in May 2021, T2DM, Depression, CECILIA, CKD now presenting with abdominal pain 20 hours in duration and CT findings concerning for SBO likely 2/2 adhesive disease. Abdominal pain may also reflect possible marginal ulceration in setting of gastric bypass surgery.     - Keep NPO, IVF  - NGT if nausea worsens and/or emesis  - Avoid anticholinergics, keep Mg>2, Phos>3, K>4  - PPI   - Dispo plan and plans regarding Gastrogaffin challenge pending AM assessment and clinical improvement/stability  - Bariatric Surgery will follow     The patient's care was discussed with the  chief resident Dr. Jackson who discussed with staff Dr. Montgomery .    Jaclyn Armstrong MD PGY2  General Surgery Resident   [facility]  Text page via [facility] Paging/Directory     ______________________________________________________________________    Chief Complaint   Abdominal pain     History is obtained from the patient and per chart review.     History of Present Illness   Jim Willingham is a 68 year old male w/ PMHx significant for laparoscopic Sylvester en y gastric bypass w/ right fascia maira autograft reinforcement of gastrojejunostomy in 2003, HTN, Afib, VT, HFrEF, NICM s/p dual chamber ICD in 2008 f/b LVAD in 2017 f/b heart transplant in May 2021, T2DM, Depression, CECILIA, CKD now presenting with abdominal pain 20 hours in duration.     He states that around 11:15 pm last night on 1/2/24, he started to experience sharp mid right  abdominal pain with associated nausea and emesis x4. Denies experiencing pain like that in the past. The pain is waxing and waning in quality, would initially improve with pain medications but return pretty quickly. Has ongoing nausea now. Denies fevers but endorses chills. Last bowel movement was 7 am this morning, loose in quality but not diarrhea without blood and non melanotic. Passed a small amount of gas 2 hours ago but decreased significantly from baseline. Denies any new numbness or tingling. Denies chest pain or SOB. Had been tolerating a diet  without issues up until last night. No dysuria or hematuria. Denies previous episodes like this. Denies previous history of bowel obstructions. Denies any other abdominal surgical history. Has been taking his immunosuppressive medications as scheduled although missed today's doses due to inability to keep food down. Last ate yesterday night. No history of marginal ulcers per chart review.     Labs on admission notable for Cr 1.75, normal LFTs and T bili, normal lipase, WBC 9.7, Hgb 12.4. CTAP notable for multiple dilated loops of small bowel throughout left sandor-abdomen with circumferential wall thickening contiguous with alimentary limb. Transition point difficult to delineate on CT but findings favored to represent SBO. Abdominal US with cholelithiasis without evidence of cholecystitis.       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 7.66 cm, Restrictive pattern , Severe mitral valve regurgitation    CECILIA (obstructive sleep apnea) 05/11/2017    Paroxysmal atrial fibrillation (H) 05/11/2017    Paroxysmal VT (H) 05/11/2017    Peripheral polyneuropathy 03/28/2023    Pulmonary cavitary lesion 11/18/2021    Rotator cuff tear arthropathy of both shoulders 11/02/2020    Type 2 diabetes mellitus with diabetic polyneuropathy (H) 03/18/2022    Uncomplicated asthma     Vitamin B12 deficiency (non anemic) 05/11/2017       Past Surgical History   Past Surgical History:   Procedure Laterality Date    ANESTHESIA CARDIOVERSION N/A 05/11/2020    Procedure: ANESTHESIA, FOR CARDIOVERSION @1100;  Surgeon: GENERIC ANESTHESIA PROVIDER;  [facility]: [facility]    BRONCHOSCOPY (RIGID OR FLEXIBLE), DIAGNOSTIC N/A 8/30/2021    Procedure: BRONCHOSCOPY, WITH BRONCHOALVEOLAR LAVAGE;  Surgeon: Perlman, David Morris, MD;  [facility]: [facility]    CV CORONARY ANGIOGRAM N/A 5/17/2022    Procedure: Coronary Angiogram;  Surgeon: Jeffrey Gibson MD;  Location:  HEART CARDIAC CATH LAB    CV CORONARY ANGIOGRAM N/A 5/23/2023    Procedure: Coronary Angiogram;  Surgeon: Scout Robins MD;  Location:  HEART CARDIAC CATH LAB    CV HEART BIOPSY N/A 5/13/2021    Procedure: Heart Biopsy;  Surgeon: Scout Robins MD;  Location:  HEART CARDIAC CATH LAB    CV HEART BIOPSY N/A 5/20/2021    Procedure: Heart Biopsy;  Surgeon: Jeffrey Gibosn MD;  Location:  HEART CARDIAC CATH LAB    CV HEART BIOPSY N/A 5/27/2021     Procedure: Heart Biopsy;  Surgeon: Jac Dover MD;  [facility]    CV HEART BIOPSY N/A 6/7/2021    Procedure: CV HEART BIOPSY;  Surgeon: Jeffrey Gibson MD;  [facility]    CV HEART BIOPSY N/A 6/21/2021    Procedure: CV HEART BIOPSY;  Surgeon: Scout Robins MD;  [facility]    CV HEART BIOPSY N/A 7/5/2021    Procedure: CV HEART BIOPSY;  Surgeon: Jeffrey Gibson MD;  [facility]    CV HEART BIOPSY N/A 7/16/2021    Procedure: Heart Biopsy;  Surgeon: Amadeo Art MD;  Location:  HEART CARDIAC CATH LAB    CV HEART BIOPSY N/A 8/5/2021    Procedure: Heart Biopsy;  Surgeon: Jeffrey Gibson MD;  [facility]    CV HEART BIOPSY N/A 8/31/2021    Procedure: Heart Cath Heart Biopsy;  Surgeon: Jeffrey Gibson MD;  [facility]    CV HEART BIOPSY N/A 9/21/2021    Procedure: CV HEART BIOPSY;  Surgeon: Jeffrey Gibson MD;  Location:  HEART CARDIAC CATH LAB    CV HEART BIOPSY N/A 10/5/2021    Procedure: CV HEART BIOPSY;  Surgeon: Jeffrey Gibson MD;  [facility]    CV HEART BIOPSY N/A 11/4/2021    Procedure: CV HEART BIOPSY;  Surgeon: Nicola Seth MD;  [facility]    CV HEART BIOPSY N/A 5/17/2022    Procedure: Heart Biopsy;  Surgeon: Jeffrey Gibsno MD;  Location:  HEART CARDIAC CATH LAB    CV HEART BIOPSY N/A 5/23/2023    Procedure: Heart Biopsy;  Surgeon: Scout Robins MD;  Location:  HEART CARDIAC CATH LAB    CV RIGHT HEART CATH MEASUREMENTS RECORDED N/A 07/24/2019    Procedure: CV RIGHT HEART CATH;  Surgeon: Renu Sears MD;  Location:  HEART CARDIAC CATH LAB    CV RIGHT HEART CATH MEASUREMENTS RECORDED N/A 08/05/2020    Procedure: CV RIGHT HEART CATH;  Surgeon: Nicola Seth MD;   Location:  HEART CARDIAC CATH LAB    CV RIGHT HEART CATH MEASUREMENTS RECORDED N/A 01/07/2021    Procedure: CV RIGHT HEART CATH;  Surgeon: Jac Dover MD;  [facility]    CV RIGHT HEART CATH MEASUREMENTS RECORDED N/A 02/23/2021    Procedure: Heart Cath Right Heart Cath;  Surgeon: Jeffrey Gibson MD;  Location:  HEART CARDIAC CATH LAB    CV RIGHT HEART CATH MEASUREMENTS RECORDED N/A 03/23/2021    Procedure: Heart Cath Right Heart Cath. request for 3/23;  Surgeon: Jeffrey Gibson MD;  Location:  HEART CARDIAC CATH LAB    CV RIGHT HEART CATH MEASUREMENTS RECORDED N/A 5/13/2021    Procedure: Right Heart Cath;  Surgeon: Scout Robins MD;  Location:  HEART CARDIAC CATH LAB    CV RIGHT HEART CATH MEASUREMENTS RECORDED N/A 5/20/2021    Procedure: Right Heart Cath;  Surgeon: Jeffrey Gibson MD;  Location:  HEART CARDIAC CATH LAB    CV RIGHT HEART CATH MEASUREMENTS RECORDED N/A 5/27/2021    Procedure: Right Heart Cath;  Surgeon: Jac Dover MD;  Location:  HEART CARDIAC CATH LAB    CV RIGHT HEART CATH MEASUREMENTS RECORDED N/A 6/7/2021    Procedure: CV RIGHT HEART CATH;  Surgeon: Jeffrey Gibson MD;  Location:  HEART CARDIAC CATH LAB    CV RIGHT HEART CATH MEASUREMENTS RECORDED N/A 6/21/2021    Procedure: CV RIGHT HEART CATH;  Surgeon: Scout Robins MD;  Location:  HEART CARDIAC CATH LAB    CV RIGHT HEART CATH MEASUREMENTS RECORDED N/A 7/5/2021    Procedure: CV RIGHT HEART CATH;  Surgeon: Jeffrey Gibson MD;  Location:  HEART CARDIAC CATH LAB    CV RIGHT HEART CATH MEASUREMENTS RECORDED N/A 7/16/2021    Procedure: Right Heart Cath;  Surgeon: Amadeo Art MD;  Location:  HEART CARDIAC CATH LAB    CV RIGHT HEART CATH MEASUREMENTS RECORDED N/A 8/5/2021    Procedure: CV RIGHT HEART CATH;  Surgeon: Jeffrey Gibson MD;  Location:  HEART CARDIAC CATH LAB     CV RIGHT HEART CATH MEASUREMENTS RECORDED N/A 8/31/2021    Procedure: Heart Cath Right Heart Cath;  Surgeon: Jeffrey Gibson MD;  Location:  HEART CARDIAC CATH LAB    CV RIGHT HEART CATH MEASUREMENTS RECORDED N/A 9/21/2021    Procedure: CV RIGHT HEART CATH;  Surgeon: Jeffrey Gibson MD;  Location:  HEART CARDIAC CATH LAB    CV RIGHT HEART CATH MEASUREMENTS RECORDED N/A 10/5/2021    Procedure: CV RIGHT HEART CATH;  Surgeon: Jeffrey Gibson MD;  Location:  HEART CARDIAC CATH LAB    CV RIGHT HEART CATH MEASUREMENTS RECORDED N/A 11/4/2021    Procedure: CV RIGHT HEART CATH;  Surgeon: Nicola Seth MD;  Location:  HEART CARDIAC CATH LAB    CV RIGHT HEART CATH MEASUREMENTS RECORDED N/A 5/17/2022    Procedure: Right Heart Catheterization;  Surgeon: Jeffrey Gibson MD;  Location:  HEART CARDIAC CATH LAB    CV RIGHT HEART CATH MEASUREMENTS RECORDED N/A 5/23/2023    Procedure: Right Heart Catheterization;  Surgeon: Scout Robins MD;  Location:  HEART CARDIAC CATH LAB    GI SURGERY  2003    Sylvester en Y    INSERT VENTRICULAR ASSIST DEVICE LEFT (HEARTMATE II) N/A 06/19/2017    Procedure: INSERT VENTRICULAR ASSIST DEVICE LEFT (HEARTMATE II);  Median Sternotomy Heartmate II Left Ventricular Assist Device Insertion on Pump Oxygenator;  Surgeon: Ronnie Quigley MD;  Location:  OR    IR CHEST TUBE PLACEMENT NON-TUNNELED RIGHT  7/11/2021    ORTHOPEDIC SURGERY  1994    right knee wired    PICC DOUBLE LUMEN PLACEMENT Right 09/23/2020    5FR PICC DL. Length-43cm (1cm out).    PICC INSERTION - DOUBLE LUMEN Right 05/09/2021    IK/BRACH    RELEASE CARPAL TUNNEL BILATERAL Bilateral 02/18/2021    Procedure: Bilateral carpal tunnel release;  Surgeon: Jermaine Brand MD;  Location: [facility] OR    TRANSPLANT HEART RECIPIENT N/A 05/05/2021    Procedure: Redo median sternotomy, lysis of adhesions, heart transplant recipient, on cardiopulmonary bypass, intraoperative  transesophageal echocardiogram per anesthesia, Implantable Cardioverter Defibrillator (ICD) removal;  Surgeon: Ronnie Quigley MD;  Location: [facility] OR       Prior to Admission Medications   No current facility-administered medications for this encounter.     Current Outpatient Medications   Medication Sig    acetaminophen (TYLENOL) 325 MG tablet Take 3 tablets (975 mg) by mouth every 6 hours as needed for other (For optimal non-opioid multimodal pain management to improve pain control.)    albuterol (PROAIR HFA/PROVENTIL HFA/VENTOLIN HFA) 108 (90 Base) MCG/ACT inhaler Inhale 2 puffs into the lungs every 4 hours as needed for shortness of breath, wheezing or cough    allopurinol (ZYLOPRIM) 300 MG tablet TAKE 1 TABLET BY MOUTH DAILY    aspirin (ASA) 81 MG chewable tablet Take 1 tablet (81 mg) by mouth daily    azithromycin (ZITHROMAX) 250 MG tablet 2 tablets the first day, then 1 tablet daily for the next 4 days    buPROPion (WELLBUTRIN) 75 MG tablet TAKE 1 TABLET(75 MG) BY MOUTH TWICE DAILY    calcium citrate-vitamin D (CITRACAL) 315-250 MG-UNIT TABS per tablet Take 1 tablet by mouth 2 times daily    diphenhydrAMINE (BENADRYL) 25 MG tablet 1 tablet Orally every 6 hrs as needed for as needed    gabapentin (NEURONTIN) 300 MG capsule TAKE ONE CAPSULE BY MOUTH TWICE A DAY    ipratropium - albuterol 0.5 mg/2.5 mg/3 mL (DUONEB) 0.5-2.5 (3) MG/3ML neb solution INHALE CONTENTS OF 1 VIAL USING NEBULIZER FOUR TIMES DAILY AS DIRECTED for 90    Melatonin 10 MG CAPS Take 1 capsule by mouth At Bedtime    montelukast (SINGULAIR) 10 MG tablet TAKE 1 TABLET BY MOUTH AT  BEDTIME    mycophenolate (GENERIC EQUIVALENT) 250 MG capsule TAKE one CAPSULE BY MOUTH TWICE A DAY    rosuvastatin (CRESTOR) 10 MG tablet TAKE ONE TABLET BY MOUTH EVERY DAY    tacrolimus (GENERIC) 0.5 MG capsule TAKE 1 CAPSULE BY MOUTH WITH 3  CAPSULES OF TACROLIMUS 1MG IN  THE MORNING FOR A TOTAL OF 3.5MG IN THE MORNING    tacrolimus (GENERIC) 1 MG capsule TAKE 3 CAPSULES  BY MOUTH WITH 1  TACROLIMUS 0.5MG IN THE MORNING  AND 3 CAPSULES IN THE EVENING  FOR TOTAL 3.5MG IN THE MORNING,  3MG IN THE EVENING    traMADol (ULTRAM) 50 MG tablet TAKE ONE TABLET BY MOUTH EVERY MORNING AND AFTERNOON AND 2 AT BEDTIME    anakinra (KINERET) 100 MG/0.67ML SOSY injection Inject 0.67 mLs (100 mg) Subcutaneous daily As directed by rheumatology          Review of Systems    The 10 point Review of Systems is negative other than noted in the HPI or here.      Physical Exam   Vital Signs: Temp: 98.3  F (36.8  C) Temp src: Oral BP: 123/75 Pulse: 110   Resp: 15 SpO2: 99 % O2 Device: None (Room air)    Weight: 0 lbs 0 oz  No intake or output data in the 24 hours ending 01/03/24 1803  Constitutional: awake, alert, cooperative, no apparent distress, and appears stated age  Respiratory: non labored breathing on room air   Cardiovascular: lowly tachycardic, normotensive   GI: abdomen soft, non distended, moderately tender to deep palpation of the right mid abdomen overlying previous laparoscopic incision with inconsistent involuntary guarding. No rebound tenderness. Well healed surgical incisions.   Skin: no bruising or bleeding, normal skin color, texture, turgor, and no redness, warmth, or swelling  Musculoskeletal: WWP, peripheral pulses present, HEAD   Neurologic: CN II-XII grossly intact, HEAD           Data   Recent Labs   Lab 01/03/24  1324   WBC 9.7   HGB 12.4*            POTASSIUM 4.7   CHLORIDE 107   CO2 20*   BUN 30.1*   CR 1.75*   ANIONGAP 11   QAMAR 7.6*   *   ALBUMIN 4.2   PROTTOTAL 6.0*   BILITOTAL 0.7   ALKPHOS 137   ALT 31   AST 32   LIPASE 19

## 2024-01-04 NOTE — PROGRESS NOTES
"/78   Pulse 110   Temp 98.3  F (36.8  C) (Oral)   Resp 10   Ht 1.727 m (5' 8\")   Wt 83.9 kg (185 lb)   SpO2 99%   BMI 28.13 kg/m      Assisted at bedside as patient was combative towards staff and requiring restraint. Dr. Whelan was at bedside during this time and asked for 5mg Haldol to be administered IV.  This writer was available to get medication from [employer] machine and administer.  Patient is being actively monitored at all times.  "

## 2024-01-04 NOTE — CONSULTS
[facility]  Cardiology Heart Failure Service (Cards 2) H&P Note    Patient Name: Jim Willingham    Medical Record Number: [id number]    YOB: 1955  PCP: Ricardo Gómez    Admit Date/Time: 1/3/2024 12:58 PM   1    Assessment & Plan   Jim Willingham is a 68 year old male who presents with PMH NICM OHT 5/6/2021 his complicated post transplant course who presents with SBO secondary to abdominal adhesions.    #SBO s/p ex lap adhesiolysis  #NICM s/p OHT 5/6/2021  Rejection history:  none  AlloMap scores:  < 35  DSAs:  none  Coronary angio/Ischemic eval:  Coronary angio 5/2022 showed minimal CAD (20% stenosis in RCA), and was negative for obstruction.  Last RHC:  5/2022 showed mildly normal biventricular filling pressures with RA 5, mPA 22, PCW 9, and CI 2.9.  Echo/cMRI:  8/2023 normal biventricular function LVEF 63% and RVEF 50%  -Biopsy 5/23: cellular grade 0R and pAMR 0  CMV D+/R+  EBV D+/R+  Toxo D-/R-    #Acute hypxic RS failure suspected secondary to aspiration  #Low-grade EBV viremia  -EBV 5/23/2023: 5,913  #WALTER on CKD  #COPD  #HTN  #DM2  #HLD    PLAN:    -On abx for possible aspiration PNA per primary team  Immunosuppression:   -Continue Tacrolimus drip at 90 (goal 4-6)  -Once patient is able to have PO intake we can work on switching back to PO tacrolimus  -Repeat Tacrolimus level tomorrow morning (ordered) and we will adjust accordingly  -Continue Cellcept 250 mg BID (IV for now)    Prophylaxis:   - CAV: c/w aspirin 81 mg daily, rosuvastatin 10 mg qhs  - GI: Protonix 40mg daily   - Bones: calcium/vitamin D       Code Status:   Full Code     Thank you for allowing me to care for this patient, please don't hesitate to contact me with any questions regarding this plan.     Patient was discussed and evaluated with Dr. Xiomara Davila MD, attending physician, who agrees with the assessment and plan above.    Josue Rainey MD  Cardiology Fellow    Reason for  consult:  Transplant medication management    History of Present Illness   Jim Willingham is a 68 year old male who presents with PMH of VT. Atrial fibrillation, CKD, DMII,COPD, NICM (S/p HM2 LVAD 6/2017) s/p OHT 5/6/2021 his post-transplant course was complicated by recurrent pleural effusions (s/p thoracenteses x2 6/2021 and 7/2021, exudative, repeatedly cultured negative), RLL cavitary lesions (per chest CT 7/14/21, BD glucan indeterminate, aspergillus negative, not started on antifungals), pericardial effusion (1.4cm per TTE 7/12/21, conservatively managed), low-grade EBV viremia, recurrent/persistent gout flare, transaminase elevation with questionable choledocholithiasis (conservatively managed with resolution), COVID-19 (8/2021, s/p monoclonal antibodies and remdesivir x5 days), and 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    Who presents on 1/3 abdominal pain for 20 hours in duration and CT findings concerning for SBO. Was taken for exploratory laparotomy and underwent adhesiolysis 1/4. Patient was extubated in the PACU. He was tachycardic in 130s on high flow satting 100%. /58. He appeared uncomfortable and was using accessory muscles.           Past Medical History    I have reviewed this patient's medical history and updated it with pertinent information if needed.   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 7.66 cm, Restrictive pattern , Severe mitral valve regurgitation    CECILIA (obstructive sleep apnea) 05/11/2017    Paroxysmal atrial fibrillation (H) 05/11/2017    Paroxysmal VT (H) 05/11/2017    Peripheral polyneuropathy 03/28/2023    Pulmonary cavitary lesion 11/18/2021    Rotator cuff tear arthropathy of both shoulders 11/02/2020    Type 2 diabetes mellitus with diabetic polyneuropathy (H) 03/18/2022    Uncomplicated asthma     Vitamin B12 deficiency (non anemic) 05/11/2017       Past Surgical History   I have reviewed this patient's surgical history and updated it with pertinent information if needed.  Past Surgical History:   Procedure Laterality Date    ANESTHESIA CARDIOVERSION N/A 05/11/2020    Procedure: ANESTHESIA, FOR CARDIOVERSION @1100;  Surgeon: GENERIC ANESTHESIA PROVIDER;  Location: [facility]    BRONCHOSCOPY (RIGID OR FLEXIBLE), DIAGNOSTIC N/A 8/30/2021    Procedure: BRONCHOSCOPY, WITH BRONCHOALVEOLAR LAVAGE;  Surgeon: Perlman, David Morris, MD;  Location:  GI    CV CORONARY ANGIOGRAM N/A 5/17/2022    Procedure: Coronary Angiogram;  Surgeon: Jeffrey Gibson MD;  Location:  HEART CARDIAC CATH LAB    CV CORONARY ANGIOGRAM N/A 5/23/2023    Procedure: Coronary Angiogram;  Surgeon: Scout Robins MD;  Location:  HEART CARDIAC CATH LAB    CV HEART BIOPSY N/A 5/13/2021    Procedure: Heart Biopsy;  Surgeon: Scout Robins MD;  Location:  HEART CARDIAC CATH LAB    CV HEART BIOPSY N/A 5/20/2021    Procedure: Heart Biopsy;  Surgeon: Jeffrey Gibson MD;  Location:  HEART CARDIAC  CATH LAB    CV HEART BIOPSY N/A 5/27/2021    Procedure: Heart Biopsy;  Surgeon: Jac Dover MD;  [facility]    CV HEART BIOPSY N/A 6/7/2021    Procedure: CV HEART BIOPSY;  Surgeon: Jeffrey Gibson MD;  Location: [facility]    CV HEART BIOPSY N/A 6/21/2021    Procedure: CV HEART BIOPSY;  Surgeon: Scout Robins MD;  Location: [facility] C[facility] LAB    CV HEART BIOPSY N/A 7/5/2021    Procedure: CV HEART BIOPSY;  Surgeon: Jeffrey Gibson MD;  Location:  HEART CARDIAC CATH LAB    CV HEART BIOPSY N/A 7/16/2021    Procedure: Heart Biopsy;  Surgeon: Amadeo Art MD;  Location:  HEART CARDIAC CATH LAB    CV HEART BIOPSY N/A 8/5/2021    Procedure: Heart Biopsy;  Surgeon: Jeffrey Gibson MD;  Location: [facility]    CV HEART BIOPSY N/A 8/31/2021    Procedure: Heart Cath Heart Biopsy;  Surgeon: Jeffrey Gibson MD;  [facility]    CV HEART BIOPSY N/A 9/21/2021    Procedure: CV HEART BIOPSY;  Surgeon: Jeffrey Gibson MD;  [facility]    CV HEART BIOPSY N/A 10/5/2021    Procedure: CV HEART BIOPSY;  Surgeon: Jeffrey Gibson MD;  Location: [facility]    CV HEART BIOPSY N/A 11/4/2021    Procedure: CV HEART BIOPSY;  Surgeon: Nicola Seth MD;  Location:  HEART CARDIAC CATH LAB    CV HEART BIOPSY N/A 5/17/2022    Procedure: Heart Biopsy;  Surgeon: Jeffrey Gibson MD;  Location:  HEART CARDIAC CATH LAB    CV HEART BIOPSY N/A 5/23/2023    Procedure: Heart Biopsy;  Surgeon: Scout Robins MD;  Location:  HEART CARDIAC CATH LAB    CV RIGHT HEART CATH MEASUREMENTS RECORDED N/A 07/24/2019    Procedure: CV RIGHT HEART CATH;  Surgeon: Renu Sears MD;  Location:  HEART CARDIAC CATH LAB    CV RIGHT HEART CATH MEASUREMENTS RECORDED N/A 08/05/2020    Procedure: CV RIGHT  HEART CATH;  Surgeon: Nicola Seth MD;  Location: [facility]    CV RIGHT HEART CATH MEASUREMENTS RECORDED N/A 01/07/2021    Procedure: CV RIGHT HEART CATH;  Surgeon: Jac Dover MD;  Location:  HEART CARDIAC CATH LAB    CV RIGHT HEART CATH MEASUREMENTS RECORDED N/A 02/23/2021    Procedure: Heart Cath Right Heart Cath;  Surgeon: Jeffrey Gibson MD;  Location:  HEART CARDIAC CATH LAB    CV RIGHT HEART CATH MEASUREMENTS RECORDED N/A 03/23/2021    Procedure: Heart Cath Right Heart Cath. request for 3/23;  Surgeon: Jeffrey Gibson MD;  Location:  HEART CARDIAC CATH LAB    CV RIGHT HEART CATH MEASUREMENTS RECORDED N/A 5/13/2021    Procedure: Right Heart Cath;  Surgeon: Scout Robins MD;  Location:  HEART CARDIAC CATH LAB    CV RIGHT HEART CATH MEASUREMENTS RECORDED N/A 5/20/2021    Procedure: Right Heart Cath;  Surgeon: Jeffrey Gibson MD;  Location:  HEART CARDIAC CATH LAB    CV RIGHT HEART CATH MEASUREMENTS RECORDED N/A 5/27/2021    Procedure: Right Heart Cath;  Surgeon: Jac Dover MD;  Location:  HEART CARDIAC CATH LAB    CV RIGHT HEART CATH MEASUREMENTS RECORDED N/A 6/7/2021    Procedure: CV RIGHT HEART CATH;  Surgeon: Jeffrey Gibson MD;  Location:  HEART CARDIAC CATH LAB    CV RIGHT HEART CATH MEASUREMENTS RECORDED N/A 6/21/2021    Procedure: CV RIGHT HEART CATH;  Surgeon: Scout Robins MD;  Location:  HEART CARDIAC CATH LAB    CV RIGHT HEART CATH MEASUREMENTS RECORDED N/A 7/5/2021    Procedure: CV RIGHT HEART CATH;  Surgeon: Jeffrey Gibson MD;  Location:  HEART CARDIAC CATH LAB    CV RIGHT HEART CATH MEASUREMENTS RECORDED N/A 7/16/2021    Procedure: Right Heart Cath;  Surgeon: Amadeo Art MD;  Location:  HEART CARDIAC CATH LAB    CV RIGHT HEART CATH MEASUREMENTS RECORDED N/A 8/5/2021    Procedure: CV RIGHT HEART CATH;  Surgeon: Jeffrey Gibson MD;   Location:  HEART CARDIAC CATH LAB    CV RIGHT HEART CATH MEASUREMENTS RECORDED N/A 8/31/2021    Procedure: Heart Cath Right Heart Cath;  Surgeon: Jeffrey Gibson MD;  Location:  HEART CARDIAC CATH LAB    CV RIGHT HEART CATH MEASUREMENTS RECORDED N/A 9/21/2021    Procedure: CV RIGHT HEART CATH;  Surgeon: Jeffrey Gibson MD;  Location:  HEART CARDIAC CATH LAB    CV RIGHT HEART CATH MEASUREMENTS RECORDED N/A 10/5/2021    Procedure: CV RIGHT HEART CATH;  Surgeon: Jeffrey Gibson MD;  Location:  HEART CARDIAC CATH LAB    CV RIGHT HEART CATH MEASUREMENTS RECORDED N/A 11/4/2021    Procedure: CV RIGHT HEART CATH;  Surgeon: Nicola Seth MD;  Location:  HEART CARDIAC CATH LAB    CV RIGHT HEART CATH MEASUREMENTS RECORDED N/A 5/17/2022    Procedure: Right Heart Catheterization;  Surgeon: Jeffrey Gibson MD;  Location:  HEART CARDIAC CATH LAB    CV RIGHT HEART CATH MEASUREMENTS RECORDED N/A 5/23/2023    Procedure: Right Heart Catheterization;  Surgeon: Scout Robins MD;  Location:  HEART CARDIAC CATH LAB    GI SURGERY  2003    Sylvester en Y    INSERT VENTRICULAR ASSIST DEVICE LEFT (HEARTMATE II) N/A 06/19/2017    Procedure: INSERT VENTRICULAR ASSIST DEVICE LEFT (HEARTMATE II);  Median Sternotomy Heartmate II Left Ventricular Assist Device Insertion on Pump Oxygenator;  Surgeon: Ronnie Quigley MD;  Location:  OR    IR CHEST TUBE PLACEMENT NON-TUNNELED RIGHT  7/11/2021    ORTHOPEDIC SURGERY  1994    right knee wired    PICC DOUBLE LUMEN PLACEMENT Right 09/23/2020    5FR PICC DL. Length-43cm (1cm out).    PICC INSERTION - DOUBLE LUMEN Right 05/09/2021    IK/BRACH    RELEASE CARPAL TUNNEL BILATERAL Bilateral 02/18/2021    Procedure: Bilateral carpal tunnel release;  Surgeon: Jermaine Brand MD;  Location: [facility] OR    TRANSPLANT HEART RECIPIENT N/A 05/05/2021    Procedure: Redo median sternotomy, lysis of adhesions, heart transplant recipient, on  cardiopulmonary bypass, intraoperative transesophageal echocardiogram per anesthesia, Implantable Cardioverter Defibrillator (ICD) removal;  Surgeon: Ronnie Quigley MD;  Location: [facility] OR           Allergies   Allergies   Allergen Reactions    Grass Shortness Of Breath    Ace Inhibitors Cough    Cats     Dust Mites Other (See Comments)     Asthma    Mold Other (See Comments)     Asthma    Penicillins Other (See Comments)     Unknown - childhood exposure    Tolerated Zosyn 9/18-9/20/2020    Per patient report he has tolerated amoxicillin    Sulfa Antibiotics Other (See Comments) and Unknown     Unknown childhood reaction       Current medications   Current Facility-Administered Medications   Medication    albuterol (PROVENTIL HFA/VENTOLIN HFA) inhaler    albuterol (PROVENTIL HFA/VENTOLIN HFA) inhaler    albuterol (PROVENTIL) neb solution 2.5 mg    dextrose 10% infusion    glucose gel 15-30 g    Or    dextrose 50 % injection 25-50 mL    Or    glucagon injection 1 mg    heparin ANTICOAGULANT injection 5,000 Units    HYDROmorphone (DILAUDID) injection 0.2 mg    [Held by provider] insulin regular (MYXREDLIN) 1 unit/mL infusion    ipratropium - albuterol 0.5 mg/2.5 mg/3 mL (DUONEB) neb solution 3 mL    lidocaine (LMX4) cream    lidocaine 1 % 0.1-1 mL    mycophenolate mofetil (CELLCEPT) 250 mg in D5W intermittent infusion    naloxone (NARCAN) injection 0.2 mg    Or    naloxone (NARCAN) injection 0.4 mg    Or    naloxone (NARCAN) injection 0.2 mg    Or    naloxone (NARCAN) injection 0.4 mg    ondansetron (ZOFRAN ODT) ODT tab 4 mg    Or    ondansetron (ZOFRAN) injection 4 mg    piperacillin-tazobactam (ZOSYN) 3.375 g vial to attach to  mL bag    sodium chloride (PF) 0.9% PF flush 3 mL    sodium chloride (PF) 0.9% PF flush 3 mL    tacrolimus (PROGRAF) 20 mcg/mL in D5W in non-PVC container 250 mL       Medications Prior to Admission   Medication Sig Dispense Refill Last Dose    acetaminophen (TYLENOL) 325 MG tablet Take 3  tablets (975 mg) by mouth every 6 hours as needed for other (For optimal non-opioid multimodal pain management to improve pain control.) 100 tablet 1 1/2/2024    albuterol (PROAIR HFA/PROVENTIL HFA/VENTOLIN HFA) 108 (90 Base) MCG/ACT inhaler Inhale 2 puffs into the lungs every 4 hours as needed for shortness of breath, wheezing or cough 40.2 g 2 1/2/2024    allopurinol (ZYLOPRIM) 300 MG tablet TAKE 1 TABLET BY MOUTH DAILY 100 tablet 3 1/2/2024    aspirin (ASA) 81 MG chewable tablet Take 1 tablet (81 mg) by mouth daily 100 tablet 1 1/2/2024    azithromycin (ZITHROMAX) 250 MG tablet 2 tablets the first day, then 1 tablet daily for the next 4 days 6 tablet 0 1/2/2024    buPROPion (WELLBUTRIN) 75 MG tablet TAKE 1 TABLET(75 MG) BY MOUTH TWICE DAILY 180 tablet 3 1/2/2024    calcium citrate-vitamin D (CITRACAL) 315-250 MG-UNIT TABS per tablet Take 1 tablet by mouth 2 times daily 180 tablet 3 1/2/2024    diphenhydrAMINE (BENADRYL) 25 MG tablet 1 tablet Orally every 6 hrs as needed for as needed   1/2/2024    gabapentin (NEURONTIN) 300 MG capsule TAKE ONE CAPSULE BY MOUTH TWICE A  capsule 3 1/2/2024    ipratropium - albuterol 0.5 mg/2.5 mg/3 mL (DUONEB) 0.5-2.5 (3) MG/3ML neb solution INHALE CONTENTS OF 1 VIAL USING NEBULIZER FOUR TIMES DAILY AS DIRECTED for 90 90 mL 0 1/2/2024    Melatonin 10 MG CAPS Take 1 capsule by mouth At Bedtime   1/2/2024    montelukast (SINGULAIR) 10 MG tablet TAKE 1 TABLET BY MOUTH AT  BEDTIME 100 tablet 3 1/2/2024    mycophenolate (GENERIC EQUIVALENT) 250 MG capsule TAKE one CAPSULE BY MOUTH TWICE A  capsule 3 1/2/2024    rosuvastatin (CRESTOR) 10 MG tablet TAKE ONE TABLET BY MOUTH EVERY DAY 90 tablet 3 1/2/2024    tacrolimus (GENERIC) 0.5 MG capsule TAKE 1 CAPSULE BY MOUTH WITH 3  CAPSULES OF TACROLIMUS 1MG IN  THE MORNING FOR A TOTAL OF 3.5MG IN THE MORNING 50 capsule 6 1/2/2024    tacrolimus (GENERIC) 1 MG capsule TAKE 3 CAPSULES BY MOUTH WITH 1  TACROLIMUS 0.5MG IN THE MORNING   AND 3 CAPSULES IN THE EVENING  FOR TOTAL 3.5MG IN THE MORNING,  3MG IN THE EVENING 180 capsule 3 1/2/2024    traMADol (ULTRAM) 50 MG tablet TAKE ONE TABLET BY MOUTH EVERY MORNING AND AFTERNOON AND 2 AT BEDTIME 120 tablet 0 1/2/2024    anakinra (KINERET) 100 MG/0.67ML SOSY injection Inject 0.67 mLs (100 mg) Subcutaneous daily As directed by rheumatology 20.1 mL 6        Social History   I have reviewed this patient's social history and updated it with pertinent information if needed. Jim Willingham  reports that he quit smoking about 29 years ago. His smoking use included cigarettes. He has never been exposed to tobacco smoke. He has never used smokeless tobacco. He reports that he does not drink alcohol and does not use drugs.    Family History   I have reviewed this patient's family history and updated it with pertinent information if needed.   Family History   Problem Relation Age of Onset    Cerebrovascular Disease Mother 64    Diabetes Mother     Hypertension Mother     Coronary Artery Disease Father     Diabetes Type 2  Father     Obesity Brother     Obesity Brother     Cerebrovascular Disease Daughter 40       Review of Systems   Review of systems not obtained due to patient factors - breathing status    Physical Exam   Temp: 98.5  F (36.9  C) Temp src: Axillary BP: 104/68 Pulse: (!) 128   Resp: 21 SpO2: 97 % O2 Device: Oxi Plus Oxygen Delivery: 6 LPM    Vital Signs with Ranges  Temp:  [97.3  F (36.3  C)-98.6  F (37  C)] 98.5  F (36.9  C)  Pulse:  [103-141] 128  Resp:  [10-28] 21  BP: ()/(50-99) 104/68  MAP:  [64 mmHg-166 mmHg] 79 mmHg  Arterial Line BP: ()/() 118/59  SpO2:  [74 %-99 %] 97 %  185 lbs 0 oz    General appearance - Lying in bed; appears in no acute distress.  Head: Normocephalic and atraumatic.   Eyes: Pupils are equal, round, and reactive to light. Extraocular movement intact. No conjunctival pallor. No scleral icterus.  Neck: No JVD, bruit.  Cardiovascular: Regular rhythm. S1  and S2 auscultated. No murmurs, rub, or gallops.  Pulmonary/Chest: Normal resp effort on RA, speaking in full sentences. Clear breath sounds to auscultation bilaterally. No wheezes, crackles, or rhonchi. No chest tenderness.   Abdominal: Bowel sounds present. Soft. No distension. No rigidity, rebound or guarding. No organomegaly, hernias or palpable masses on deep palpation. No CVA tenderness.  Extremities: Warm, no cyanosis, 2+ distal pulses bilaterally. No edema.   Skin: Skin is warm and not diaphoretic. No rash, bruising, or erythema.  Neuro: Alert and oriented to person, place, and time. No focal neurological deficit.    Data   Data reviewed today:  I personally reviewed: recent data    LABS Reviewed  Recent Labs   Lab 01/04/24  0830 01/04/24  0709 01/04/24  0517 01/04/24  0420 01/04/24  0345 01/04/24  0345 01/03/24  1324   WBC  --   --   --   --   --   --  9.7   HGB  --   --  13.1* 13.0*  --  13.0* 12.4*   MCV  --   --   --   --   --   --  100   PLT  --   --   --   --   --   --  233   NA  --   --  139 136  --  138 138   POTASSIUM  --   --  5.6* 5.5*  --  5.4* 4.7   CHLORIDE  --   --   --   --   --   --  107   CO2  --   --   --   --   --   --  20*   BUN  --   --   --   --   --   --  30.1*   CR  --   --   --   --   --   --  1.75*   ANIONGAP  --   --   --   --   --   --  11   QAMAR  --   --   --   --   --   --  7.6*   * 204* 208* 203*   < > 206* 107*   ALBUMIN  --   --   --   --   --   --  4.2   PROTTOTAL  --   --   --   --   --   --  6.0*   BILITOTAL  --   --   --   --   --   --  0.7   ALKPHOS  --   --   --   --   --   --  137   ALT  --   --   --   --   --   --  31   AST  --   --   --   --   --   --  32   LIPASE  --   --   --   --   --   --  19    < > = values in this interval not displayed.       IMAGES Reviewed    Recent Results (from the past 24 hour(s))   US Abdomen Limited    Narrative    EXAMINATION: US ABDOMEN LIMITED, 1/3/2024 2:08 PM    COMPARISON: CT abdomen 7/23/2021.    HISTORY: Right upper  quadrant pain, positive Winn sign on exam,  history gallstones, evaluate cholecystitis    TECHNIQUE: The abdomen was scanned in standard fashion with  specialized ultrasound transducer(s) using both grey scale and limited  color Doppler techniques.    FINDINGS:   Fluid: Trace perihepatic ascites. No pleural effusions.    Liver: The liver demonstrates normal echotexture, measuring 17 cm in  craniocaudal dimension. There is no focal mass. The hepatic veins and  IVC are dilated.    Gallbladder: Cholelithiasis. There is no wall thickening,  pericholecystic fluid or positive sonographic Winn's sign. The  gallbladder wall measures 2 mm.    Bile Ducts: Both the intra- and extrahepatic biliary system are of  normal caliber. The common bile duct measures 3 mm in diameter.    Pancreas: Visualized portions of the head and body of the pancreas are  unremarkable.     Kidney: The right kidney measures 10.2 cm long. There is no  hydronephrosis or hydroureter, no shadowing renal calculi, cystic  lesion or mass.       Impression    IMPRESSION:   1.  Cholelithiasis without sonographic evidence of cholecystitis.  2.  Trace ascites.  3.  Mild hepatomegaly with dilated IVC and hepatic veins maybe  suggestive of hepatic venous congestion.    JOANNE MCMAHAN MD         SYSTEM ID:  [id number]   CT Abdomen Pelvis w/o Contrast   Result Value    Radiologist flags Bowel obstruction (Urgent)    Narrative    EXAM: CT ABDOMEN PELVIS W/O CONTRAST 1/3/2024 5:04 PM    HISTORY: 68 years Male History of Sylvester-en-Y epigastric pain evaluate  for complication    COMPARISON: CT 7/23/2021.    TECHNIQUE: Axial CT images from the lung bases through the lower  abdomen were obtained without IV contrast. Coronal and sagittal  reformats provided.     FINDINGS:    LINES/TUBES: None.    HEPATIC: No suspicious focal hepatic masses or lesions.    BILIARY: Multiple layering calcified gallstones. No pericholecystic  fluid or gallbladder wall thickening. No intra or  extrahepatic biliary  dilatation.    SPLEEN: Normal size. No focal lesions.    PANCREAS: No mass or peripancreatic fluid.    ADRENALS: Unremarkable.    RENAL: Bilateral perinephric stranding, nonspecific. No  hydronephrosis. No suspicious renal mass, however limited secondary to  lack of intravenous contrast. No renal calculi.    GASTROINTESTINAL: Postsurgical changes of Sylvester-en-Y gastric bypass.  Dilated alimentary limb with fecalization and transitional point in  the left upper quadrant, (4/39 and 6/83) adjacent and inferior to the  jejunojejunostomy, Fecalization of loops of small bowel left  hemiabdomen, suggestive of delayed transit. Colonic diverticulosis  without CT evidence of diverticulitis. No definite pneumatosis. No  portal venous gas.    MESENTERY/PERITONEUM: Trace amount of abdominal ascites. No  pneumoperitoneum..    LYMPH NODES: No lymphadenopathy.    VASCULAR: Moderate to severe aortoiliac vascular calcifications.  Vascular patency cannot be assessed on this noncontrast examination.    LUNG BASES: Mild bronchiectasis and subpleural reticular pulmonary  opacities seen in the right lower lobe.    MUSCULOSKELETAL: Multilevel degenerative changes of the spine. No  acute or suspicious osseous abnormality.      Impression    IMPRESSION:     1. Dilated alimentary limb with fecalization and transitional point in  the left upper quadrant, (4/39 and 6/83) adjacent and inferior to the  jejunojejunostomy, concerning for high-grade obstruction;     - questionable mural thickening involving segments of the dilated  small bowel bowel loop, diffuse mesenteric streakiness/congestion,  small ascites in the pelvis; difficult evaluation for ischemic change  with noncontrast technique; which cannot be excluded; consider  correlation with lactate; no pneumatosis, mesenteric gas or portal  venous gas.    2. Mild bronchiectasis and subpleural reticular pulmonary opacities  and right lower lobe, may represent  infection/information. Recommend  follow-up to resolution.    3. Cholelithiasis..    [Urgent Result: Bowel obstruction]    Finding was identified on 1/3/2024 5:37 PM.     Tiffany Zurita PA-C was contacted by Dr. Simon at 1/3/2024 5:49  PM and verbalized understanding of the urgent finding.     Impression point  #1 discussed with surgery resident on-call by Tu  at 10:00 PM 1/3/2024    XR Chest Port 1 View    Narrative    Exam: XR CHEST PORT 1 VIEW, 1/4/2024 5:48 AM    Indication: chest xr    Comparison: Chest x-ray 9/17/2023    Findings: AP portable chest radiograph at 15 degrees. Enteric tube  traversing into the lower abdomen with the tip out of the field of  view. Unchanged dehiscence proximal sternotomy wire. Retained  pacemaker lead over the upper chest, unchanged.    Trachea is midline. Cardiac silhouette is unchanged. Chronic blunting  of the right costophrenic angle. Left costophrenic angle is out of  field of view. No significant pneumothorax. Increased predominantly  perihilar interstitial opacities with peribronchial cuffing and  retrocardiac opacity.      Impression    Impression: Increased predominantly perihilar hazy interstitial  opacities with peribronchial cuffing suggesting edema. Prominent  retrocardiac opacity, favors atelectasis, although cannot exclude  superimposed developing infectious consolidation. Recommend follow-up  to clearing.    I have personally reviewed the examination and initial interpretation  and I agree with the findings.    PARAMJIT BEAULIEU DO         SYSTEM ID:  [id number]

## 2024-01-04 NOTE — OP NOTE
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 No evidence of PEtersons or JJ defect   Complications: None.   Implants: None.       INDICATIONS FOR PROCEDURE  Jim Willingham underwent prior LRYGB surgery in 2003 by [facility].  He has symptoms of worsening SBO, and was offered surgery.    After understanding the risks and benefits of proceeding with a diagnostic laparoscopy, possible laparotomy;  he agreed to an operation as outlined by me.    OPERATIVE PROCEDURE: The patient was prepared in routine fashion and under the benefits of general anesthesia, the abdomen was entered with a left upper quadrant Veress needle and the abdomen was insufflated with carbon dioxide to a maximum pressure of 15 mm Hg. With the patient in  Trendelenberg, a total of 3ports were placed.     The entirety of small bowel was examined. Lysis of adhesions to the upper abdominal wall (previousl mediastinal tube sites) was undertaken.The extensive common channel was decompressed. The Sylvester limb was tortuous and dilated. It appeared the site of pathology was near the JJ anastomosis. We were unable to progress from our three left sided ports.     Under direct vision, 3 additinoal ports were placed on the right side. The bowel was again noted to have a transition in  bowel caliber. It appeared to be due to a kink in the small bowel at the distal Sylvester limb. As this portion of bowel was difficult to fully expose safely due to its caliber and dilation, the decision was made to pursue small laparotomy to ensure full relief of obstruction.    A 7cm upper midline incision was made. All layers of the abdomiinal wall were incised with a 10 blade, folllowe by cautery. The abdomen wasopen. The bowel was eviscerated and run in full. It was clear the site of obstruction was adhesive, due to a distal Sylvester limb kink. Tedious lysis of adhesions of this whole area was performed, taking care not to injure the bowel. The area was freed up. There was no bowel compromise.    Abdomen was irrigated. The abdomen was closed with running 0-looped pds x2 fascial layer. The wound was infiltrated with 10 ml local (see MAR for type).    Next, we returned to laparoscopy to ensure no injuries. Everything looked good.    Abdomen was desufflated. A TAP block at the six port sites was done using 20 ml local. Skin at all sitesd closed with staples  Next, the procedure was completed.  The skin was closed in sterile fashion and sterile dressings were applied.      All sponge and needle counts were correct x2 at the end of the case and the patient tolerated the procedure well.       I was present for all critical components of this case.    Frederick Montgomery MD

## 2024-01-04 NOTE — CONSULTS
[facility] ICU CONSULT NOTE  1/4/2024    PRIMARY TEAM: [facility]  PRIMARY PHYSICIAN: Dr. Montgomery    REASON FOR CRITICAL CARE ADMISSION: Acute respiratory failure   ADMITTING PHYSICIAN: Dr. Barbosa    ASSESSMENT: Jim Willingham is a 68 year old male w/ PMHx significant for  laparoscopic Sylvester en y gastric bypass w/ right fascia maira autograft reinforcement of gastrojejunostomy in 2003, HTN, Afib, VT, HFrEF, NICM s/p dual chamber ICD in 2008 f/b LVAD in 2017 f/b heart transplant in May 2021, T2DM, Depression, CECILIA, CKD now presenting with abdominal pain 20 hours in duration and CT findings concerning for SBO likely 2/2 adhesive disease. He is s/p exploratory laparotomy converted to open, extensive lysis of adhesions. Procedure complicated by massive aspiration.     PLAN:     Neuro/ pain/ sedation:  #Acute pain  #Encephalopathy  #Hx of depression  - Monitor neurological status. Notify provider for any acute changes in exam.  - Minimize narcotics as able given hypercarbic respiratory failure and encephalopathy.   - Will poorly controlled, will consider Precedex vs. Ketamine.   - In the PACU, reportedly required Precedex and intermittent Haloperidol for management of agitation.   - Hold PTA Bupropion as NPO     Pulmonary :   #Acute hypercarbic respiratory failure  #Massive aspiration  #Hx of CECILIA  - Large volume aspiration event during induction s/p intra op therapeutic bronchoscopy  - Initial ABG with mixed resp and metabolic acidosis. Respiratory acidosis improving  - Increased work of breathing on arrival. Started on HFNC, minimum 30 L flow. Titrate FIO2 as able.   - Low threshold to intubate, would avoid BIPAP in the this setting, as he needs to cough and mobilize secretions.      Cardiovascular:    Hx of heart transplant  Prior NICM s/p LVAD  #Hx of Atrial fibrillation  - Monitor hemodynamic status.   - Normotensive and tachycardic  - Heart Failure consult for management of immunosuppression. Continue Tacrolimus and MMF  IV.      GI/Nutrition:   #Hx of RNYGB  # SBO 2/2 adhesive disease, distal danni limb kink  - NPO   - NG to LIWS  - Hold all oral meds     Renal/ Fluid Balance:    #WALTER on CKD  - Will monitor intake and output.  - Baseline creatinine 1.7, up to 2.05  - Hyperkalemia improved 5.6-->5.3  - Will give 500 ml LR      Endocrine:  #Stress hyperglycemia    - insulin gtt     ID:  #Aspiration  - started on Zosyn intra op for aspiration in the setting of immunosuppressed status  - Continue for now.      Hematology:     - Hemoglobin and platelet count stable   - Start Heparin SQ     MSK:    - PT and OT consulted. Appreciate recommendations.     Lines/ tubes/ drains:  - arterial line, PIV, christopher     Prophylaxis:    - DVT prophylaxis: Mechanical.   - PUD prophylaxis: none    Code status:  - Full     Disposition:  -  ICU.     Patient seen, findings and plan discussed with ICU staff.  Critical care time exclusive of procedures: 50 minutes    Aisha De La Mater  CC time 50 minutes    - - - - - - - - - - - - - - - - - - - - - - - - - - - - - - - - - - - - - - - - - - - - - - - - - - - - - - - - - - - - - - - - - - - - - - - -     HISTORY PRESENTING ILLNESS:  Jim Willingham is a 68 year old male w/ PMHx significant for  laparoscopic Danni en y gastric bypass w/ right fascia maira autograft reinforcement of gastrojejunostomy in 2003, HTN, Afib, VT, HFrEF, NICM s/p dual chamber ICD in 2008 f/b LVAD in 2017 f/b heart transplant in May 2021, T2DM, Depression, CECILIA, CKD now presenting with abdominal pain 20 hours in duration and CT findings concerning for SBO likely 2/2 adhesive disease. He is s/p exploratory laparotomy converted to open, extensive lysis of adhesions. Procedure complicated by massive aspiration.     REVIEW OF SYSTEMS: 10 point ROS neg other than the symptoms noted above in the HPI.    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 7.66 cm, Restrictive pattern , Severe mitral valve regurgitation    CECILIA (obstructive sleep apnea) 05/11/2017    Paroxysmal atrial fibrillation (H) 05/11/2017    Paroxysmal VT (H) 05/11/2017    Peripheral polyneuropathy 03/28/2023    Pulmonary cavitary lesion 11/18/2021    Rotator cuff tear arthropathy of both shoulders 11/02/2020    Type 2 diabetes mellitus with diabetic polyneuropathy (H) 03/18/2022    Uncomplicated asthma     Vitamin B12 deficiency (non anemic) 05/11/2017       SURGICAL HISTORY:   Past Surgical History:   Procedure Laterality Date    ANESTHESIA CARDIOVERSION N/A 05/11/2020    Procedure: ANESTHESIA, FOR CARDIOVERSION @1100;  Surgeon: GENERIC ANESTHESIA PROVIDER;  [facility]: [facility]    BRONCHOSCOPY (RIGID OR FLEXIBLE), DIAGNOSTIC N/A 8/30/2021    Procedure: BRONCHOSCOPY, WITH BRONCHOALVEOLAR LAVAGE;  Surgeon: Perlman, David Morris, MD;  Location: [facility] GI    CV CORONARY ANGIOGRAM N/A 5/17/2022    Procedure: Coronary Angiogram;  Surgeon: Jeffrey Gibson MD;  Location:  HEART CARDIAC CATH LAB    CV CORONARY ANGIOGRAM N/A  5/23/2023    Procedure: Coronary Angiogram;  Surgeon: Scout Robins MD;  [facility]    CV HEART BIOPSY N/A 5/13/2021    Procedure: Heart Biopsy;  Surgeon: Scout Robins MD;  [facility]    CV HEART BIOPSY N/A 5/20/2021    Procedure: Heart Biopsy;  Surgeon: Jeffrey Gibson MD;  [facility]    CV HEART BIOPSY N/A 5/27/2021    Procedure: Heart Biopsy;  Surgeon: Jac Dover MD;  [facility]    CV HEART BIOPSY N/A 6/7/2021    Procedure: CV HEART BIOPSY;  Surgeon: Jeffrey Gibson MD;  [facility]    CV HEART BIOPSY N/A 6/21/2021    Procedure: CV HEART BIOPSY;  Surgeon: Scout Robins MD;  [facility]    CV HEART BIOPSY N/A 7/5/2021    Procedure: CV HEART BIOPSY;  Surgeon: Jeffrey Gibson MD;  [facility]    CV HEART BIOPSY N/A 7/16/2021    Procedure: Heart Biopsy;  Surgeon: Amadeo Art MD;  [facility]    CV HEART BIOPSY N/A 8/5/2021    Procedure: Heart Biopsy;  Surgeon: Jeffrey Gibson MD;  Location:  HEART CARDIAC CATH LAB    CV HEART BIOPSY N/A 8/31/2021    Procedure: Heart Cath Heart Biopsy;  Surgeon: Jeffrey Gibson MD;  [facility]    CV HEART BIOPSY N/A 9/21/2021    Procedure: CV HEART BIOPSY;  Surgeon: Jeffrey Gibson MD;  Location:  HEART CARDIAC CATH LAB    CV HEART BIOPSY N/A 10/5/2021    Procedure: CV HEART BIOPSY;  Surgeon: Jeffrey Gibson MD;  Location:  HEART CARDIAC CATH LAB    CV HEART BIOPSY N/A 11/4/2021    Procedure: CV HEART BIOPSY;  Surgeon: Nicola Seth MD;  Location:  HEART CARDIAC CATH LAB    CV HEART BIOPSY N/A 5/17/2022    Procedure: Heart Biopsy;  Surgeon: Jeffrey Gibson MD;  Location:  HEART CARDIAC CATH LAB     CV HEART BIOPSY N/A 5/23/2023    Procedure: Heart Biopsy;  Surgeon: Scout Robins MD;  [facility]    CV RIGHT HEART CATH MEASUREMENTS RECORDED N/A 07/24/2019    Procedure: CV RIGHT HEART CATH;  Surgeon: Renu Sears MD;  [facility]    CV RIGHT HEART CATH MEASUREMENTS RECORDED N/A 08/05/2020    Procedure: CV RIGHT HEART CATH;  Surgeon: Nicola Seth MD;  [facility]    CV RIGHT HEART CATH MEASUREMENTS RECORDED N/A 01/07/2021    Procedure: CV RIGHT HEART CATH;  Surgeon: Jac Dover MD;  Location:  HEART CARDIAC CATH LAB    CV RIGHT HEART CATH MEASUREMENTS RECORDED N/A 02/23/2021    Procedure: Heart Cath Right Heart Cath;  Surgeon: Jeffrey Gibson MD;  Location:  HEART CARDIAC CATH LAB    CV RIGHT HEART CATH MEASUREMENTS RECORDED N/A 03/23/2021    Procedure: Heart Cath Right Heart Cath. request for 3/23;  Surgeon: Jeffrey Gibson MD;  Location:  HEART CARDIAC CATH LAB    CV RIGHT HEART CATH MEASUREMENTS RECORDED N/A 5/13/2021    Procedure: Right Heart Cath;  Surgeon: Scout Robins MD;  Location:  HEART CARDIAC CATH LAB    CV RIGHT HEART CATH MEASUREMENTS RECORDED N/A 5/20/2021    Procedure: Right Heart Cath;  Surgeon: Jeffrey Gibson MD;  Location:  HEART CARDIAC CATH LAB    CV RIGHT HEART CATH MEASUREMENTS RECORDED N/A 5/27/2021    Procedure: Right Heart Cath;  Surgeon: Jac Dover MD;  Location:  HEART CARDIAC CATH LAB    CV RIGHT HEART CATH MEASUREMENTS RECORDED N/A 6/7/2021    Procedure: CV RIGHT HEART CATH;  Surgeon: Jeffrey Gibson MD;  Location:  HEART CARDIAC CATH LAB    CV RIGHT HEART CATH MEASUREMENTS RECORDED N/A 6/21/2021    Procedure: CV RIGHT HEART CATH;  Surgeon: Scout Robins MD;  Location:  HEART CARDIAC CATH LAB    CV RIGHT HEART CATH MEASUREMENTS RECORDED N/A 7/5/2021    Procedure: CV RIGHT HEART  CATH;  Surgeon: Jeffrey Gibson MD;  Location:  HEART CARDIAC CATH LAB    CV RIGHT HEART CATH MEASUREMENTS RECORDED N/A 7/16/2021    Procedure: Right Heart Cath;  Surgeon: Amadeo Art MD;  Location:  HEART CARDIAC CATH LAB    CV RIGHT HEART CATH MEASUREMENTS RECORDED N/A 8/5/2021    Procedure: CV RIGHT HEART CATH;  Surgeon: Jeffrey Gibson MD;  Location:  HEART CARDIAC CATH LAB    CV RIGHT HEART CATH MEASUREMENTS RECORDED N/A 8/31/2021    Procedure: Heart Cath Right Heart Cath;  Surgeon: Jeffrey Gibson MD;  Location:  HEART CARDIAC CATH LAB    CV RIGHT HEART CATH MEASUREMENTS RECORDED N/A 9/21/2021    Procedure: CV RIGHT HEART CATH;  Surgeon: Jeffrey Gibson MD;  Location:  HEART CARDIAC CATH LAB    CV RIGHT HEART CATH MEASUREMENTS RECORDED N/A 10/5/2021    Procedure: CV RIGHT HEART CATH;  Surgeon: Jeffrey Gibson MD;  Location:  HEART CARDIAC CATH LAB    CV RIGHT HEART CATH MEASUREMENTS RECORDED N/A 11/4/2021    Procedure: CV RIGHT HEART CATH;  Surgeon: Nicola Seth MD;  Location:  HEART CARDIAC CATH LAB    CV RIGHT HEART CATH MEASUREMENTS RECORDED N/A 5/17/2022    Procedure: Right Heart Catheterization;  Surgeon: Jeffrey Gibson MD;  Location:  HEART CARDIAC CATH LAB    CV RIGHT HEART CATH MEASUREMENTS RECORDED N/A 5/23/2023    Procedure: Right Heart Catheterization;  Surgeon: Scout Robins MD;  Location:  HEART CARDIAC CATH LAB    GI SURGERY  2003    Sylvester en Y    INSERT VENTRICULAR ASSIST DEVICE LEFT (HEARTMATE II) N/A 06/19/2017    Procedure: INSERT VENTRICULAR ASSIST DEVICE LEFT (HEARTMATE II);  Median Sternotomy Heartmate II Left Ventricular Assist Device Insertion on Pump Oxygenator;  Surgeon: Ronnie Quigley MD;  [facility]    IR CHEST TUBE PLACEMENT NON-TUNNELED RIGHT  7/11/2021    ORTHOPEDIC SURGERY  1994    right knee wired    PICC DOUBLE LUMEN PLACEMENT Right 09/23/2020    5FR  PICC DL. Length-43cm (1cm out).    PICC INSERTION - DOUBLE LUMEN Right 2021    IK/BRACH    RELEASE CARPAL TUNNEL BILATERAL Bilateral 2021    Procedure: Bilateral carpal tunnel release;  Surgeon: Jermaine Brand MD;  Location:  OR    TRANSPLANT HEART RECIPIENT N/A 2021    Procedure: Redo median sternotomy, lysis of adhesions, heart transplant recipient, on cardiopulmonary bypass, intraoperative transesophageal echocardiogram per anesthesia, Implantable Cardioverter Defibrillator (ICD) removal;  Surgeon: Ronnie Quigley MD;  Location:  OR       SOCIAL HISTORY:   Social History     Socioeconomic History    Marital status:    Tobacco Use    Smoking status: Former     Types: Cigarettes     Quit date:      Years since quittin.0     Passive exposure: Never    Smokeless tobacco: Never   Vaping Use    Vaping Use: Never used   Substance and Sexual Activity    Alcohol use: No    Drug use: No    Sexual activity: Yes     Partners: Female   Other Topics Concern    Parent/sibling w/ CABG, MI or angioplasty before 65F 55M? No     Social Determinants of Health     Financial Resource Strain: High Risk (2023)    Financial Resource Strain     Within the past 12 months, have you or your family members you live with been unable to get utilities (heat, electricity) when it was really needed?: Yes   Food Insecurity: Low Risk  (2023)    Food Insecurity     Within the past 12 months, did you worry that your food would run out before you got money to buy more?: No     Within the past 12 months, did the food you bought just not last and you didn t have money to get more?: No   Transportation Needs: Low Risk  (2023)    Transportation Needs     Within the past 12 months, has lack of transportation kept you from medical appointments, getting your medicines, non-medical meetings or appointments, work, or from getting things that you need?: No   Interpersonal Safety: Low Risk  (2023)     Interpersonal Safety     Do you feel physically and emotionally safe where you currently live?: Yes     Within the past 12 months, have you been hit, slapped, kicked or otherwise physically hurt by someone?: No     Within the past 12 months, have you been humiliated or emotionally abused in other ways by your partner or ex-partner?: No   Housing Stability: Low Risk  (9/26/2023)    Housing Stability     Do you have housing? : Yes     Are you worried about losing your housing?: No       FAMILY HISTORY: No bleeding/clotting disorders nor problems with anesthesia.     ALLERGIES:      Allergies   Allergen Reactions    Grass Shortness Of Breath    Ace Inhibitors Cough    Cats     Dust Mites Other (See Comments)     Asthma    Mold Other (See Comments)     Asthma    Penicillins Other (See Comments)     Unknown - childhood exposure    Tolerated Zosyn 9/18-9/20/2020    Per patient report he has tolerated amoxicillin    Sulfa Antibiotics Other (See Comments) and Unknown     Unknown childhood reaction       MEDICATIONS:  No current facility-administered medications on file prior to encounter.  acetaminophen (TYLENOL) 325 MG tablet, Take 3 tablets (975 mg) by mouth every 6 hours as needed for other (For optimal non-opioid multimodal pain management to improve pain control.)  albuterol (PROAIR HFA/PROVENTIL HFA/VENTOLIN HFA) 108 (90 Base) MCG/ACT inhaler, Inhale 2 puffs into the lungs every 4 hours as needed for shortness of breath, wheezing or cough  allopurinol (ZYLOPRIM) 300 MG tablet, TAKE 1 TABLET BY MOUTH DAILY  aspirin (ASA) 81 MG chewable tablet, Take 1 tablet (81 mg) by mouth daily  azithromycin (ZITHROMAX) 250 MG tablet, 2 tablets the first day, then 1 tablet daily for the next 4 days  buPROPion (WELLBUTRIN) 75 MG tablet, TAKE 1 TABLET(75 MG) BY MOUTH TWICE DAILY  calcium citrate-vitamin D (CITRACAL) 315-250 MG-UNIT TABS per tablet, Take 1 tablet by mouth 2 times daily  diphenhydrAMINE (BENADRYL) 25 MG tablet, 1 tablet  Orally every 6 hrs as needed for as needed  gabapentin (NEURONTIN) 300 MG capsule, TAKE ONE CAPSULE BY MOUTH TWICE A DAY  ipratropium - albuterol 0.5 mg/2.5 mg/3 mL (DUONEB) 0.5-2.5 (3) MG/3ML neb solution, INHALE CONTENTS OF 1 VIAL USING NEBULIZER FOUR TIMES DAILY AS DIRECTED for 90  Melatonin 10 MG CAPS, Take 1 capsule by mouth At Bedtime  montelukast (SINGULAIR) 10 MG tablet, TAKE 1 TABLET BY MOUTH AT  BEDTIME  mycophenolate (GENERIC EQUIVALENT) 250 MG capsule, TAKE one CAPSULE BY MOUTH TWICE A DAY  rosuvastatin (CRESTOR) 10 MG tablet, TAKE ONE TABLET BY MOUTH EVERY DAY  tacrolimus (GENERIC) 0.5 MG capsule, TAKE 1 CAPSULE BY MOUTH WITH 3  CAPSULES OF TACROLIMUS 1MG IN  THE MORNING FOR A TOTAL OF 3.5MG IN THE MORNING  tacrolimus (GENERIC) 1 MG capsule, TAKE 3 CAPSULES BY MOUTH WITH 1  TACROLIMUS 0.5MG IN THE MORNING  AND 3 CAPSULES IN THE EVENING  FOR TOTAL 3.5MG IN THE MORNING,  3MG IN THE EVENING  traMADol (ULTRAM) 50 MG tablet, TAKE ONE TABLET BY MOUTH EVERY MORNING AND AFTERNOON AND 2 AT BEDTIME  anakinra (KINERET) 100 MG/0.67ML SOSY injection, Inject 0.67 mLs (100 mg) Subcutaneous daily As directed by rheumatology        PHYSICAL EXAMINATION:  Temp:  [97.3  F (36.3  C)-98.6  F (37  C)] 98.5  F (36.9  C)  Pulse:  [103-141] 124  Resp:  [10-28] 21  BP: ()/(50-99) 104/68  MAP:  [64 mmHg-166 mmHg] 102 mmHg  Arterial Line BP: ()/() 144/81  FiO2 (%):  [50 %] 50 %  SpO2:  [74 %-100 %] 100 %    General: awake, moderate distress  HEENT: normocephalic, atraumatic  Neuro: opens eyes to voice, moving all extremities to command  CV: s1 s2  Pulm: coarse, strong cough, mobilizing secretions  Abd: soft, round, appropriately tender  : christopher in place  Extremities: warm  Skin: warm  Incisions: abdominal dressings CDI  Psych: calm    LABS: Reviewed.   Arterial Blood Gases   Recent Labs   Lab 01/04/24  0940 01/04/24  0655 01/04/24  0517 01/04/24  0420   PH 7.23* 7.16* 7.08* 7.12*   PCO2 44 55* 71* 60*   PO2  114* 86 89 182*   HCO3 18* 20* 21 20*     Complete Blood Count   Recent Labs   Lab 01/04/24  0940 01/04/24  0517 01/04/24  0420 01/04/24  0345 01/03/24  1324   WBC 4.1  --   --   --  9.7   HGB 12.6* 13.1* 13.0* 13.0* 12.4*     --   --   --  233     Basic Metabolic Panel  Recent Labs   Lab 01/04/24  0940 01/04/24  0830 01/04/24  0709 01/04/24  0517 01/04/24  0420 01/04/24  0345 01/04/24  0345 01/03/24  1324     --   --  139 136  --  138 138   POTASSIUM 5.3  --   --  5.6* 5.5*  --  5.4* 4.7   CHLORIDE 109*  --   --   --   --   --   --  107   CO2 17*  --   --   --   --   --   --  20*   BUN 40.9*  --   --   --   --   --   --  30.1*   CR 2.05*  --   --   --   --   --   --  1.75*   * 196* 204* 208* 203*   < > 206* 107*    < > = values in this interval not displayed.     Liver Function Tests  Recent Labs   Lab 01/04/24  0940 01/03/24  1324   AST 30 32   ALT 26 31   ALKPHOS 111 137   BILITOTAL 0.9 0.7   ALBUMIN 3.8 4.2     Pancreatic Enzymes  Recent Labs   Lab 01/03/24  1324   LIPASE 19     Coagulation Profile  No lab results found in last 7 days.    IMAGING:  Recent Results (from the past 24 hour(s))   US Abdomen Limited    Narrative    EXAMINATION: US ABDOMEN LIMITED, 1/3/2024 2:08 PM    COMPARISON: CT abdomen 7/23/2021.    HISTORY: Right upper quadrant pain, positive Winn sign on exam,  history gallstones, evaluate cholecystitis    TECHNIQUE: The abdomen was scanned in standard fashion with  specialized ultrasound transducer(s) using both grey scale and limited  color Doppler techniques.    FINDINGS:   Fluid: Trace perihepatic ascites. No pleural effusions.    Liver: The liver demonstrates normal echotexture, measuring 17 cm in  craniocaudal dimension. There is no focal mass. The hepatic veins and  IVC are dilated.    Gallbladder: Cholelithiasis. There is no wall thickening,  pericholecystic fluid or positive sonographic Winn's sign. The  gallbladder wall measures 2 mm.    Bile Ducts: Both the  intra- and extrahepatic biliary system are of  normal caliber. The common bile duct measures 3 mm in diameter.    Pancreas: Visualized portions of the head and body of the pancreas are  unremarkable.     Kidney: The right kidney measures 10.2 cm long. There is no  hydronephrosis or hydroureter, no shadowing renal calculi, cystic  lesion or mass.       Impression    IMPRESSION:   1.  Cholelithiasis without sonographic evidence of cholecystitis.  2.  Trace ascites.  3.  Mild hepatomegaly with dilated IVC and hepatic veins maybe  suggestive of hepatic venous congestion.    JOANNE MCMAHAN MD         SYSTEM ID:  [id number]   CT Abdomen Pelvis w/o Contrast   Result Value    Radiologist flags Bowel obstruction (Urgent)    Narrative    EXAM: CT ABDOMEN PELVIS W/O CONTRAST 1/3/2024 5:04 PM    HISTORY: 68 years Male History of Sylvester-en-Y epigastric pain evaluate  for complication    COMPARISON: CT 7/23/2021.    TECHNIQUE: Axial CT images from the lung bases through the lower  abdomen were obtained without IV contrast. Coronal and sagittal  reformats provided.     FINDINGS:    LINES/TUBES: None.    HEPATIC: No suspicious focal hepatic masses or lesions.    BILIARY: Multiple layering calcified gallstones. No pericholecystic  fluid or gallbladder wall thickening. No intra or extrahepatic biliary  dilatation.    SPLEEN: Normal size. No focal lesions.    PANCREAS: No mass or peripancreatic fluid.    ADRENALS: Unremarkable.    RENAL: Bilateral perinephric stranding, nonspecific. No  hydronephrosis. No suspicious renal mass, however limited secondary to  lack of intravenous contrast. No renal calculi.    GASTROINTESTINAL: Postsurgical changes of Sylvester-en-Y gastric bypass.  Dilated alimentary limb with fecalization and transitional point in  the left upper quadrant, (4/39 and 6/83) adjacent and inferior to the  jejunojejunostomy, Fecalization of loops of small bowel left  hemiabdomen, suggestive of delayed transit. Colonic  diverticulosis  without CT evidence of diverticulitis. No definite pneumatosis. No  portal venous gas.    MESENTERY/PERITONEUM: Trace amount of abdominal ascites. No  pneumoperitoneum..    LYMPH NODES: No lymphadenopathy.    VASCULAR: Moderate to severe aortoiliac vascular calcifications.  Vascular patency cannot be assessed on this noncontrast examination.    LUNG BASES: Mild bronchiectasis and subpleural reticular pulmonary  opacities seen in the right lower lobe.    MUSCULOSKELETAL: Multilevel degenerative changes of the spine. No  acute or suspicious osseous abnormality.      Impression    IMPRESSION:     1. Dilated alimentary limb with fecalization and transitional point in  the left upper quadrant, (4/39 and 6/83) adjacent and inferior to the  jejunojejunostomy, concerning for high-grade obstruction;     - questionable mural thickening involving segments of the dilated  small bowel bowel loop, diffuse mesenteric streakiness/congestion,  small ascites in the pelvis; difficult evaluation for ischemic change  with noncontrast technique; which cannot be excluded; consider  correlation with lactate; no pneumatosis, mesenteric gas or portal  venous gas.    2. Mild bronchiectasis and subpleural reticular pulmonary opacities  and right lower lobe, may represent infection/information. Recommend  follow-up to resolution.    3. Cholelithiasis..    [Urgent Result: Bowel obstruction]    Finding was identified on 1/3/2024 5:37 PM.     Tiffany Zurita PA-C was contacted by Dr. Simon at 1/3/2024 5:49  PM and verbalized understanding of the urgent finding.     Impression point  #1 discussed with surgery resident on-call by Tu  at 10:00 PM 1/3/2024     I have personally reviewed the examination and initial interpretation  and I agree with the findings.    KENROY CASON MD         SYSTEM ID:  [id number]   XR Chest Port 1 View    Narrative    Exam: XR CHEST PORT 1 VIEW, 1/4/2024 5:48 AM    Indication: chest  xr    Comparison: Chest x-ray 9/17/2023    Findings: AP portable chest radiograph at 15 degrees. Enteric tube  traversing into the lower abdomen with the tip out of the field of  view. Unchanged dehiscence proximal sternotomy wire. Retained  pacemaker lead over the upper chest, unchanged.    Trachea is midline. Cardiac silhouette is unchanged. Chronic blunting  of the right costophrenic angle. Left costophrenic angle is out of  field of view. No significant pneumothorax. Increased predominantly  perihilar interstitial opacities with peribronchial cuffing and  retrocardiac opacity.      Impression    Impression: Increased predominantly perihilar hazy interstitial  opacities with peribronchial cuffing suggesting edema. Prominent  retrocardiac opacity, favors atelectasis, although cannot exclude  superimposed developing infectious consolidation. Recommend follow-up  to clearing.    I have personally reviewed the examination and initial interpretation  and I agree with the findings.    PARAMJIT BEAULIEU DO         SYSTEM ID:  [id number]

## 2024-01-04 NOTE — BRIEF OP NOTE
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 Tethered, kinked distal Rebecca limb with evidence of transition point, likely causing pSBO. Adhesions lysed and bowel freed .  Complications: None.  Implants: * No implants in log *        -Zosyn x5 d  -PCA  -HOme meds (IV)  -Cards Txp med team consult    Frederick Montgomery MD

## 2024-01-04 NOTE — SIGNIFICANT EVENT
Massive Aspiration Event    Patient presented for emergent surgery.  He was induced without prior NGT placement to evacuate stomach (surgeon reports that patients with RNYGB do not often receive NGT decompression).      There was massive volume particulate regurgitation and witnessed aspiration at the time of induction and attempted intubation.  The patient was immediately placed in Trendelenberg (head down) positioning.  A yankour and un-tipped suction tubing were used to clean out the posterior oropharynx as best as possible.  The patient was easily intubated with a good view.  Flexible suction catheters were placed through the ETT with large volume suctioning of creamy, thick, particulate vomitous.  A large 5.8 fiberoptic scope was then used to sequentially clean out each branch of the bronchial tree as distally as possible.  I spent about 45min fastidiously suctioning out all visible particulate and liquid aspirate with the patient in Trendelenberg.  Then once all visible vomit was gone, I used a video laryngoscope to suction out all visible residual vomit in the posterior oropharynx and above the vocal cords / cuff on the balloon.  I then partially deflated the balloon and removed the existing endotracheal tube, then further suctioned around the glotic opening, and re-intubated the patient with a clean ETT hooked up to a clean circuit.  I then did one final complete bronchoscopy with a fresh / clean scope and ensured there was no residual visible vomit within those parts of the bronchial tree that were accessible with the large scope.    Because of the patient's immunocompromised status, Zosyn was administered in anticipation of possible aspiration pneumonia.  The surgical service is aware.    ROGERIO Whelan MD  Clinical   Anesthesia / Critical Care  *[phone]

## 2024-01-04 NOTE — ANESTHESIA PROCEDURE NOTES
Airway       Patient location during procedure: OR       Procedure Start/Stop Times: 1/4/2024 1:30 AM  Staff -        Anesthesiologist:  Parviz Whelan MD       Resident/Fellow: Abdulkadir Owen MD       Performed By: resident and with residents       Procedure performed by resident/fellow/CRNA in presence of a teaching physician.    Consent for Airway        Urgency: elective  Indications and Patient Condition       Indications for airway management: jason-procedural       Induction type:intravenous       Mask difficulty assessment: 1 - vent by mask    Final Airway Details       Final airway type: endotracheal airway       Successful airway: ETT - single  Endotracheal Airway Details        ETT size (mm): 7.0       Cuffed: yes       Successful intubation technique: direct laryngoscopy       DL Blade Type: MAC 4       Grade View of Cords: 1       Adjucts: stylet       Position: Right       Measured from: lips       Secured at (cm): 23       Bite block used: None    Post intubation assessment        Placement verified by: capnometry, equal breath sounds and chest rise        Number of attempts at approach: 2       Number of other approaches attempted: 0       Secured with: pink tape       Ease of procedure: easy       Dentition: Intact    Medication(s) Administered   Medication Administration Time: 1/4/2024 1:30 AM    Additional Comments       After patient received induction medications, he had profuse vomiting that we suctioned and then took two attempts to intubated, switching from ramsey resident to senior resident to intubate. After the cuff was inflated, vomitus was suctioned out of the ETT and then we did therapeutic fiberoptic bronchoscopy to remove as much of the aspirated material as possible. He had massive bilateral aspiration noted.

## 2024-01-04 NOTE — ANESTHESIA PROCEDURE NOTES
Arterial Line Procedure Note    Pre-Procedure   Staff -        Anesthesiologist:  Parviz Whelan MD       Resident/Fellow: Abdulakdir Owen MD       Performed By: resident and with residents       Procedure performed by resident/fellow/CRNA in presence of a teaching physician.         Location: OR       Pre-Anesthestic Checklist: patient identified, IV checked, risks and benefits discussed, informed consent, monitors and equipment checked, pre-op evaluation and at physician/surgeon's request  Timeout:       Correct Patient: Yes        Correct Procedure: Yes        Correct Site: Yes        Correct Position: Yes   Line Placement:   This line was placed Post Induction  Procedure   Procedure: arterial line and elective       Diagnosis: Blood Pressure Monitoring and/or Frequent Blood Draws       Laterality: left       Insertion Site: radial.  Sterile Prep        Standard elements of sterile barrier followed       Skin prep: Chloraprep  Insertion/Injection        Technique: Esteban's test completed, Seldinger Technique and ultrasound guided        1. Ultrasound was used to evaluate the access site.       2. Artery evaluated via ultrasound for patency/adequacy.       3. Using real-time ultrasound the needle/catheter was observed entering the artery/vein.       Catheter Type/Size: 20 G, 1.75 in/4.5 cm quick cath (integral wire)  Narrative         Secured by: suture       Tegaderm dressing used.       Complications: None apparent,        Arterial waveform: Yes        IBP within 10% of NIBP: Yes   Comments:  Routine arterial line placement without complications.

## 2024-01-04 NOTE — ANESTHESIA POSTPROCEDURE EVALUATION
Patient: Jim Willingham    Procedure: Procedure(s):  Diagnostic Laparoscopy, Exploratory Laparotomy with Extensive lysis of adhesions.       Anesthesia Type:  General    Note:  Disposition: ICU            ICU Sign Out: Anesthesiologist/ICU physician sign out WAS performed   Postop Pain Control: Uneventful            Sign Out: Well controlled pain   PONV: No   Neuro/Psych:             Events: Emergence delirium            Sign Out: Other neuro status   Airway/Respiratory:             Events: Aspiration; REFRACTORY hypoxia            Sign Out: O2 supplementation   CV/Hemodynamics: Uneventful            Sign Out: Acceptable CV status; No obvious hypovolemia; No obvious fluid overload   Other NRE: NONE   DID A NON-ROUTINE EVENT OCCUR? YES    Event details/Postop Comments:  Please see separate event note regarding his aspiration on induction.  Managed for 2h in PACU with agitation and borderline oxygenation.  To ICU for further close monitoring and BIPAP           Last vitals:  Vitals Value Taken Time   BP 91/63 01/04/24 0630   Temp     Pulse 128 01/04/24 0641   Resp 22 01/04/24 0641   SpO2 98 % 01/04/24 0641   Vitals shown include unfiled device data.    Electronically Signed By: Parviz Whelan MD  January 4, 2024  6:42 AM

## 2024-01-04 NOTE — PROGRESS NOTES
Surgery Note  01/04/24     Seen in PACU. Agitated post op, trying to get out of bed. More calm after 5 mg haldol. Tachycardic to the 130s. Intermittently tolerating BIPAP. Reportedly trying to remove mask.  ml since OR.     Vitals reviewed, , 104/58, SPO2 95%     GEN: Agitated in bed   CV: Tachycardic, non cyanotic   RESP: BIPAP, somewhat labored breathing   ABD: soft, non-tender, without guarding or rebound tenderness, incisions c/d/I       Labs reviewed   Cr 1.75  Lactic acid 0.8  I gucci 4.3   K 5.6   Na 139   7.08/71/89/21     AM CXR reviewed     AP: 68 year old M with a history of RNY-GB in 2003 presenting with SBO now status post laparoscopy converted to laparotomy and lysis of adhesions with Dr. Montgomery 1/3/2023.     PMH: Heart Transplant (2021), CKD, HTN, COPD, DM2, HLD, Afib     SICU admission   NPO, NGT   Russell, strict IOs  Electrolyte replacement   Zosyn x5 days for aspiration pneunomia   Transplant cardiology consultation (PTA tacrolimus, mycophenolate)   Prophylactic heparin     Heavenly CarrilloBarahona   Surgery Resident   [id number]

## 2024-01-04 NOTE — PROGRESS NOTES
I reviewed the risks of surgery with Jim Willingham.    These include, but are not limited to, death, myocardial infarction, pneumonia, urinary tract infection, deep venous thrombosis with or without pulmonary embolus, abdominal infection from bowel injury or abscess, bowel obstruction, wound infection, and bleeding; furthermore, there is risk that the intended approach of the surgery (for example, laparoscopic) would need to be changed to open (laparotomy) if laparoscopy does not continue to be safe  Finally, there is also potential that the intended procedure will NOT provide benefit.     Discussed risks of leak, pain, open abdomen, hernia, need for additional procedures    Frederick Montgomery MD

## 2024-01-04 NOTE — ANESTHESIA CARE TRANSFER NOTE
Patient: Jim Willingham    Procedure: Procedure(s):  Diagnostic Laparoscopy, Exploratory Laparotomy with Extensive lysis of adhesions.       Diagnosis: Small bowel obstruction (H) [K56.609]  Diagnosis Additional Information: No value filed.    Anesthesia Type:   General     Note:    Oropharynx: oral airway in place and nasal gatric tube in place  Level of Consciousness: unresponsive and drowsy  Oxygen Supplementation: face mask    Independent Airway: airway patency satisfactory and stable  Dentition: dentition unchanged  Vital Signs Stable: post-procedure vital signs reviewed and stable  Report to RN Given: handoff report given  Patient transferred to: PACU    Handoff Report: Identifed the Patient, Identified the Reponsible Provider, Reviewed the pertinent medical history, Discussed the surgical course, Reviewed Intra-OP anesthesia mangement and issues during anesthesia, Set expectations for post-procedure period and Allowed opportunity for questions and acknowledgement of understanding      Vitals:  Vitals Value Taken Time   /87 01/04/24 0540   Temp     Pulse 130 01/04/24 0546   Resp 30 01/04/24 0534   SpO2 94 % 01/04/24 0547   Vitals shown include unfiled device data.    Electronically Signed By: Yamileth Torrez MD  January 4, 2024  5:48 AM

## 2024-01-05 NOTE — CONSULTS
Care Management Initial Consult    General Information  Assessment completed with: Patient,    Type of CM/SW Visit: Initial Assessment    Primary Care Provider verified and updated as needed: Yes   Readmission within the last 30 days: no previous admission in last 30 days         Advance Care Planning: Advance Care Planning Reviewed: present on chart          Communication Assessment  Patient's communication style: spoken language (English or Bilingual)    Hearing Difficulty or Deaf: no   Wear Glasses or Blind: no    Cognitive  Cognitive/Neuro/Behavioral: .WDL except  Level of Consciousness: alert  Arousal Level: opens eyes spontaneously, arouses to voice  Orientation: oriented x 4  Mood/Behavior: calm, cooperative  Best Language: 0 - No aphasia  Speech: spontaneous, clear    Living Environment:   People in home: grandchild(lidia), child(lidia), adult, spouse, sibling(s)  Graham Esposito  Current living Arrangements: house      Able to return to prior arrangements: yes       Family/Social Support:  Care provided by: self  Provides care for: child(lidia)  Marital Status:   Wife, Children, Sibling(s)  Cate       Description of Support System: Involved, Supportive         Current Resources:   Patient receiving home care services: No     Community Resources: None  Equipment currently used at home: none  Supplies currently used at home: None    Employment/Financial:  Employment Status: retired        Financial Concerns: none   Referral to Financial Worker: No       Does the patient's insurance plan have a 3 day qualifying hospital stay waiver?  N/A     Lifestyle & Psychosocial Needs:  Social Determinants of Health     Food Insecurity: Low Risk  (9/26/2023)    Food Insecurity     Within the past 12 months, did you worry that your food would run out before you got money to buy more?: No     Within the past 12 months, did the food you bought just not last and you didn t have money to get more?: No   Depression: Not at risk  (9/26/2023)    PHQ-2     PHQ-2 Score: 0   Housing Stability: Low Risk  (9/26/2023)    Housing Stability     Do you have housing? : Yes     Are you worried about losing your housing?: No   Tobacco Use: Medium Risk (11/21/2023)    Patient History     Smoking Tobacco Use: Former     Smokeless Tobacco Use: Never     Passive Exposure: Never   Financial Resource Strain: High Risk (9/26/2023)    Financial Resource Strain     Within the past 12 months, have you or your family members you live with been unable to get utilities (heat, electricity) when it was really needed?: Yes   Alcohol Use: Not on file   Transportation Needs: Low Risk  (9/26/2023)    Transportation Needs     Within the past 12 months, has lack of transportation kept you from medical appointments, getting your medicines, non-medical meetings or appointments, work, or from getting things that you need?: No   Physical Activity: Not on file   Interpersonal Safety: Low Risk  (9/26/2023)    Interpersonal Safety     Do you feel physically and emotionally safe where you currently live?: Yes     Within the past 12 months, have you been hit, slapped, kicked or otherwise physically hurt by someone?: No     Within the past 12 months, have you been humiliated or emotionally abused in other ways by your partner or ex-partner?: No   Stress: Not on file   Social Connections: Not on file       Functional Status:  Prior to admission patient needed assistance:   Dependent ADLs:: Independent, Ambulation-no assistive device  Dependent IADLs:: Independent       Mental Health Status:  Mental Health Status: No Current Concerns       Chemical Dependency Status:  Chemical Dependency Status: No Current Concerns             Values/Beliefs:  Spiritual, Cultural Beliefs, Yazidi Practices, Values that affect care:                 Additional Information:  Pt familiar to writer from heart transplant program. Pt is s/p heart transplant 5/6/2021. Pt admitted 1/3/2024 for SBO and s/p  exploratory lap converted to open. Met w/ pt in his ICU room w/ his ex-wife present at bedside. Pt alert and oriented x4 and sitting in chair. Pt reports prior to admission he was doing well. He was independent w/ ADLs, IADLs, ambulation and driving. Pt reports he has been enjoying being actively involved in granddaughter's school activities. Pt lives at home w/ his wife, Cate, and granddaughter, Graham, whom they have custody of. Pt did not have any in services and does not anticipate needing services at home. Pt reports he has good support from his wife.     Anticipate discharge home w/ no needs, but writer will continue to follow.     ROSALBA France, [facility]   Advanced Heart Failure   Heart Transplant/LVAD  Phone: [phone]  Pager: [phone]

## 2024-01-05 NOTE — PLAN OF CARE
ICU End of Shift Summary. See flowsheets for vital signs and detailed assessment.    Changes this shift: Neuro intact, up ambulating with SBA; oxygen weaned to room air but patient still feeling short of breath and congested, viral swap negative, started 2L NC with humidification, CXR and diuresed with 20 lasix and good output; discussed decongestant with MD; hemodynamically stable but tachy in the 130s- will try another form of IV med for pain tonight, LR bolus challenge this am with no change on HR; NGT remains in place for decompression and strict NPO per surgery; incision remains intact and patient's pain controlled with dilaudid PCA, no bowel movement yet    Plan: Continue plan of care, promote sleep tonight, melatonin ordered      Goal Outcome Evaluation:  Outcome Evaluation: oxygen titrated off, still having labored breathing, diuresed; up ambulating with therapy; no BM or passing gas yet; NGT in place to L.I.S.; strict NPO

## 2024-01-05 NOTE — PROGRESS NOTES
SURGICAL ICU PROGRESS NOTE  01/05/2024      ASSESSMENT: Jim Willingham is a 68 year old male w/ PMHx significant for  laparoscopic Sylvester en y gastric bypass w/ right fascia maira autograft reinforcement of gastrojejunostomy in 2003, HTN, Afib, VT, HFrEF, NICM s/p dual chamber ICD in 2008 f/b LVAD in 2017 f/b heart transplant in May 2021, T2DM, Depression, CECILIA, CKD now presenting with abdominal pain 20 hours in duration and CT findings concerning for SBO likely 2/2 adhesive disease. He is s/p exploratory laparotomy converted to open, extensive lysis of adhesions. Procedure complicated by massive aspiration.      Interval Events:   - Weaned from high flow NC to Oxymask 10 L  - PCA placed, used 1.5 mg last 24 hours for pain  - Slept poorly overnight per nursing report    PLAN:  Changes Today:  - Remain NPO with NJ on LIS for today, if having BM may advance per surgery  - Discontinue arterial line  - Wean FiO2 as tolerated, if able to wean from oxymask to NC  - Scheduled zopimex and albuterol   - 500 ml bolus of LR    Neurological:  #Acute pain  #Encephalopathy  #Hx of depression  - Monitor neurological status. Notify provider for any acute changes in exam.  - Dilaudid PCA per surgical team, total of 1.5 mg last 24 hours  - In the PACU, reportedly required Precedex and intermittent Haloperidol for management of agitation. Currently at baseline mentation.  - Hold PTA Bupropion as NPO   dextrose      HYDROmorphone      [Held by provider] insulin regular Stopped (01/04/24 0909)    tacrolimus 90 mcg/hr (01/05/24 0900)       Pulmonary:   Vital Signs: Most Recent  Ranges (24 hours)   Resp: 16  SpO2: 100 % Resp  Min: 16  Max: 27  SpO2  Min: 89 %  Max: 100 %   O2 Device:   (2 LPM)    Recent Labs   Lab 01/05/24  0335 01/04/24  1713 01/04/24  0940 01/04/24  0655   PH 7.27* 7.24* 7.23* 7.16*   PCO2 40 42 44 55*   PO2 83 109* 114* 86   HCO3 19* 18* 18* 20*     #Acute hypercarbic respiratory failure  #Massive aspiration  #Hx of  CECILIA  - Large volume aspiration event during induction s/p intra op therapeutic bronchoscopy  - Initial ABG with mixed resp and metabolic acidosis. Respiratory acidosis improving  - Titrate FiO2 as able, currently on 10 L oxymask.  - Transition from Oxymask to NC if tolerating downtitration  - Xopenex Q4H scheduled, Albuterol, duonebs Q6H PRN    Cardiovascular:  Vital Signs: Most Recent  Ranges (24 hours)   Pulse: (!) 123     Arterial Line MAP (mmHg): 89 mmHg  Arterial Line BP: 133/68 Pulse  Min: 116  Max: 130  No data recorded  Arterial Line MAP (mmHg)  Min: 79 mmHg  Max: 144 mmHg  Arterial Line BP  Min: 118/68  Max: 192/120   Hx of heart transplant  Prior NICM s/p LVAD  #Hx of Atrial fibrillation  - Monitor hemodynamic status.   - Normotensive and tachycardic  - Continue Tacrolimus drip and MMF IV.     # Cardiovascular PPX  - hold pta ASA 81 mg daily  - hold pta rosuvastatin 10 mg at bedtime     Recent Labs   Lab 01/05/24  0335 01/04/24  0940 01/04/24  0517 01/04/24  0420   LACT 1.4 1.3 0.8 0.7      dextrose      HYDROmorphone      [Held by provider] insulin regular Stopped (01/04/24 0909)    tacrolimus 90 mcg/hr (01/05/24 0900)       Gastroenterology/Nutrition:  Recent Labs   Lab 01/05/24  0335 01/04/24  0940 01/03/24  1324   AST 36 30 32   ALT 21 26 31   ALKPHOS 82 111 137   BILITOTAL 0.9 0.9 0.7   ALBUMIN 3.7 3.8 4.2     Last Bowel Movement:  (unknown)     #Hx of RNYGB  # SBO 2/2 adhesive disease, distal danni limb kink  - Diet: NPO for Medical/Clinical Reasons Except for: Ice Chips, Meds  - NG to LIWS  - Hold all oral meds  - Will remain NPO until first bowel movement per surgery, will be ok to advance diet and remove NG once cleared    Fluids/Electrolytes/Renal:   I/O last 3 completed shifts:  In: 1583.23 [I.V.:1073.23; NG/GT:10; IV Piggyback:500]  Out: 1280 [Urine:1180; Emesis/NG output:100]   UOP: 0.64 ml/kg/hr (avg over last shift)  Recent Labs   Lab 01/05/24  0335 01/04/24  1924 01/04/24  0932  01/04/24  0517 01/04/24  0420 01/04/24  0345 01/03/24  1324   *  --  139 139 136   < > 138   POTASSIUM 4.6 4.7 5.3 5.6* 5.5*   < > 4.7   CHLORIDE 115*  --  109*  --   --   --  107   MAG 1.9  --  1.8  --   --   --   --    PHOS 3.8  --  4.1  --   --   --   --    QAMAR 7.7*  --  7.1*  --   --   --  7.6*   ICAW 4.2*  --  4.1* 4.3* 4.2*   < >  --    CO2 16*  --  17*  --   --   --  20*   BUN 43.2*  --  40.9*  --   --   --  30.1*   CR 1.83*  --  2.05*  --   --   --  1.75*    < > = values in this interval not displayed.       #WALTER on CKD  - Will monitor intake and output.  - Baseline creatinine 1.7, downtrending to 1.83 today  - Hyperkalemia resolved 5.6-->4.6  -  bolus    Endocrine:  Recent Labs   Lab 01/05/24  0807 01/05/24  0342 01/05/24  0335 01/04/24  1929 01/04/24  1550 01/04/24  1221 01/04/24  0940 01/04/24  0830 01/04/24  0709 01/04/24  0517 01/04/24  0420 01/04/24  0345   * 155* 151* 162* 162* 182* 188* 196* 204* 208* 203* 206*     Hemoglobin A1C   Date Value Ref Range Status   01/04/2024 5.4 <5.7 % Final     Comment:     Normal <5.7%   Prediabetes 5.7-6.4%    Diabetes 6.5% or higher     Note: Adopted from ADA consensus guidelines.   05/04/2021 5.1 0 - 5.6 % Final     Comment:     Normal <5.7% Prediabetes 5.7-6.4%  Diabetes 6.5% or higher - adopted from ADA   consensus guidelines.       #Stress hyperglycemia    - insulin gtt, held while NPO   - Goal BG <180 to optimize wound healing    ID:  Vital Signs: Most Recent  Ranges (24 hours)   Temp: 98  F (36.7  C) Temp  Min: 98  F (36.7  C)  Max: 99  F (37.2  C)     Recent Labs   Lab 01/05/24  0335 01/04/24  0940 01/03/24  1324   WBC 10.3 4.1 9.7       # Intra-op aspiration event with increased O2 requirement postop  - Continue Zosyn x  5 days for aspiration pneumonia (Last day of antiobiotics: 1/8/24)  - Trend WBC daily  - Continue to monitor respiratory status     # Immunosuppression  - Continue Tacrolimus drip at 90 (goal 4-6). May switch to PO once  cleared for diet.   - Continue Cellcept 250 mg BID IV      Cultures:  Lab Results   Component Value Date/Time    CULT No growth 07/09/2021 06:40 AM    CULT (A) 07/08/2021 09:14 PM     On day 2, isolated in broth only:  Staphylococcus epidermidis      CULT  07/08/2021 09:14 PM     Critical Value/Significant Value, preliminary result only, called to and read back by  Neli Boswell RN 1527 7/10/21 AM       CULT No anaerobes isolated 07/08/2021 09:14 PM    CULT  07/08/2021 09:14 PM     Since this specimen was not transported in the proper anaerobic transport media, the   absence of anaerobes in this culture does not rule out the presence of anaerobes in this   specimen.      CULT No growth 07/08/2021 05:46 PM    CULT No growth 07/08/2021 04:25 PM    CULT No growth 06/30/2021 09:37 AM    CULT No growth 06/30/2021 09:32 AM    CULT No growth 06/29/2021 05:10 PM    CULT No anaerobes isolated 06/29/2021 05:10 PM    CULT Culture negative after 4 weeks 06/29/2021 05:10 PM    CULT Culture negative for acid fast bacilli 06/29/2021 05:10 PM    CULT  06/29/2021 05:10 PM     Assayed at [facility] [address] [place] [phone]    CULT No growth after 4 weeks 06/29/2021 02:11 PM    CULT No growth 06/28/2021 10:41 PM    CULT (A) 06/28/2021 10:08 PM     Cultured on the 2nd day of incubation:  Staphylococcus epidermidis      CULT  06/28/2021 10:08 PM     Critical Value/Significant Value, preliminary result only, called to and read back by  Lisa Rush RN 6/30/21 @ 0427 TF      CULT  06/28/2021 10:08 PM     (Note)  POSITIVE for STAPHYLOCOCCUS EPIDERMIDIS and POSITIVE for the mecA  gene (resistant to methicillin) by [employer] multiplex nucleic acid  test. Final identification and antimicrobial susceptibility testing  will be verified by standard methods.    Specimen tested with Verigene multiplex, gram-positive blood culture  nucleic acid test for the following targets: Staph aureus, Staph  epidermidis,  Staph lugdunensis, other Staph species, Enterococcus  faecalis, Enterococcus faecium, Streptococcus species, S. agalactiae,  S. anginosus grp., S. pneumoniae, S. pyogenes, Listeria sp., mecA  (methicillin resistance) and Lucía/B (vancomycin resistance).    Critical Value/Significant Value called to and read back by Darcie Saeed RN at 0701 6.30.21. amd      CULT No growth 05/26/2021 06:42 PM    CULT No growth 05/26/2021 06:32 PM    CULT No acid fast bacilli isolated after 6 weeks 05/26/2021 07:04 AM    CULT Heavy growth  Normal jaxon   05/25/2021 10:15 AM    CULT No growth 05/17/2021 08:13 AM    CULT No growth 05/17/2021 08:03 AM    CULT No growth 05/15/2021 03:04 PM    CULT No growth 11/04/2020 11:20 AM    CULT No growth 09/23/2020 05:18 AM    CULT No growth 09/22/2020 04:41 AM    CULT No growth 09/21/2020 07:21 AM    CULT No growth 09/20/2020 04:42 PM    CULT No growth 09/19/2020 08:24 AM    CULT (A) 09/18/2020 08:35 PM     Cultured on the 2nd day of incubation:  Staphylococcus hominis  Susceptibility testing done on previous specimen      CULT  09/18/2020 08:35 PM     Critical Value/Significant Value, preliminary result only, called to and read back by  Tamy Leventhal RN 1724 9/20/20 AM      CULT No growth 09/18/2020 05:13 PM    CULT (A) 09/17/2020 05:37 PM     Cultured on the 1st day of incubation:  Staphylococcus hominis      CULT  09/17/2020 05:37 PM     Critical Value/Significant Value, preliminary result only, called to and read back by  John Howard RN @1414 09/18/2020 hdh/lm      CULT  09/17/2020 05:37 PM     (Note)  POSITIVE for Staphylococci other than S.aureus, S.epidermidis and  S.lugdunensis, by [employer] multiplex nucleic acid test.  Coagulase-negative staphylococci are the most common venipuncture or  collection associated skin CONTAMINANTS grown in blood cultures.  Final identification and antimicrobial susceptibility testing will be  verified by standard methods.    Specimen tested with [email or web address]  multiplex, gram-positive blood culture  nucleic acid test for the following targets: Staph aureus, Staph  epidermidis, Staph lugdunensis, other Staph species, Enterococcus  faecalis, Enterococcus faecium, Streptococcus species, S. agalactiae,  S. anginosus grp., S. pneumoniae, S. pyogenes, Listeria sp., mecA  (methicillin resistance) and Lucía/B (vancomycin resistance).    Critical Value/Significant Value called to and read back by Le Carmona RN. @1712. 9.18.20. BS.       CULT No growth 09/17/2020 03:54 PM    CULT No growth 01/21/2020 04:00 PM    CULT (A) 01/21/2020 04:24 AM     Heavy growth  Haemophilus influenzae  Beta lactamase negative  Beta-lactamase negative Haemophilus influenzae are usually susceptible to ampicillin,   amoxacillin/clavulanic acid, levofloxacin, and 3rd generation cephalosporins, such as   ceftriaxone.      CULT No growth 01/20/2020 07:19 PM    CULT No growth 01/20/2020 05:59 PM    CULT (A) 01/16/2020 05:03 AM     10,000 to 50,000 colonies/mL  Coagulase negative Staphylococcus      CULT No growth 01/15/2020 10:26 PM    CULT (A) 01/15/2020 10:08 PM     Cultured on the 1st day of incubation:  Staphylococcus epidermidis      CULT  01/15/2020 10:08 PM     Critical Value/Significant Value, preliminary result only, called to and read back by  Alphonse Cuba RN @ 1920. 1/16/20. AV      CULT  01/15/2020 10:08 PM     (Note)  POSITIVE for STAPHYLOCOCCUS EPIDERMIDIS and NEGATIVE for the mecA  gene (not resistant to methicillin) by Verigene nucleic acid test.  The mecA gene was not detected. Final identification and  antimicrobial susceptibility testing will be verified by standard  methods.    Specimen tested with Verigene multiplex, gram-positive blood culture  nucleic acid test for the following targets: Staph aureus, Staph  epidermidis, Staph lugdunensis, other Staph species, Enterococcus  faecalis, Enterococcus faecium, Streptococcus species, S. agalactiae,  S. anginosus grp., S.  pneumoniae, S. pyogenes, Listeria sp., mecA  (methicillin resistance) and Lucía/B (vancomycin resistance).    Critical Value/Significant Value called to and read back by Dr. Leos, @2214 01/16/20..      CULT No growth 04/13/2018 02:36 PM    CULT No growth 04/13/2018 02:35 PM    CULT No growth 05/24/2017 08:34 AM    CULT Moderate growth Normal jaxon 05/23/2017 07:30 PM    CULTURE No Growth 12/16/2021 03:44 PM    CULTURE No Growth 12/16/2021 03:37 PM    LABFRAM <25 PMNs/low power field 08/30/2021 11:03 AM    LABFRAM No organisms seen 08/30/2021 11:03 AM    FGTL <31 12/15/2021 10:15 AM    FGTL <31 08/25/2021 02:27 PM    FGTL 67 07/14/2021 04:33 PM    FGTL 53 06/28/2021 10:08 PM    FGTL <31 05/25/2021 04:27 PM    MRSATARGET Negative 08/25/2021 04:41 PM        Heme:  Recent Labs   Lab 01/05/24  0335 01/04/24  0940 01/04/24  0517 01/04/24  0420 01/04/24  0345 01/03/24  1324   HGB 11.8* 12.6* 13.1* 13.0*   < > 12.4*    203  --   --   --  233    < > = values in this interval not displayed.     # Acute Blood Loss Anemia   # Anemia of Critical Illness   - Hemoglobin 11.8  - Transfuse if hgb <7.0 or signs/symptoms of hypoperfusion. Monitor and trend.     MSK:  # Weakness and Deconditioning of Critical Illness  - PT and OT consulted  - Calcium, Vitamin D    General Cares/Prophylaxis:    - DVT prophylaxis: Mechanical. Subcutaneous Heparin.  - PUD prophylaxis: none  LDA:  - PIV x 2  - Art line  - NG tube    Disposition:  - SICU.   - Barriers to transfer out of ICU: Increased O2 requirements    Patient seen and discussed with staff. Dr. Karthik Barbosa.    Ender Cerna MD   Orthopedic Surgery Resident      ====================================  SUBJECTIVE: Jim feels his breathing continues to improve. He has no other complaints. He has tolerated oxymask overnight without concerns. His Tacrolimus drip was stopped at noon yesterday for no reason, restarted this am.     OBJECTIVE:   Temp:  [98  F (36.7  C)-99  F (37.2  C)]  98  F (36.7  C)  Pulse:  [116-130] 123  Resp:  [16-27] 16  MAP:  [79 mmHg-144 mmHg] 89 mmHg  Arterial Line BP: (118-192)/() 133/68  FiO2 (%):  [30 %-50 %] 30 %  SpO2:  [89 %-100 %] 100 %  FiO2 (%): 30 %  Resp: 16      Physical Exam:  General: awake, moderate distress  HEENT: normocephalic, atraumatic  Neuro: opens eyes to voice, moving all extremities to command  CV: s1 s2  Pulm: coarse, strong cough, mobilizing secretions  Abd: soft, round, appropriately tender  : christopher in place  Extremities: warm  Skin: warm  Incisions: abdominal dressings CDI  Psych: calm    LABS:   Arterial Blood Gases   Recent Labs   Lab 01/05/24 0335 01/04/24  1713 01/04/24  0940 01/04/24  0655   PH 7.27* 7.24* 7.23* 7.16*   PCO2 40 42 44 55*   PO2 83 109* 114* 86   HCO3 19* 18* 18* 20*     Complete Blood Count   Recent Labs   Lab 01/05/24 0335 01/04/24  0940 01/04/24  0517 01/04/24  0420 01/04/24  0345 01/03/24  1324   WBC 10.3 4.1  --   --   --  9.7   HGB 11.8* 12.6* 13.1* 13.0*   < > 12.4*    203  --   --   --  233    < > = values in this interval not displayed.     Renal Panel  Recent Labs   Lab 01/05/24 0335 01/04/24  1924 01/04/24  0940 01/04/24  0517 01/04/24  0420 01/04/24  0345 01/03/24  1324   *  --  139 139 136   < > 138   POTASSIUM 4.6 4.7 5.3 5.6* 5.5*   < > 4.7   CHLORIDE 115*  --  109*  --   --   --  107   CO2 16*  --  17*  --   --   --  20*   BUN 43.2*  --  40.9*  --   --   --  30.1*   CR 1.83*  --  2.05*  --   --   --  1.75*    < > = values in this interval not displayed.     Glucose  Recent Labs   Lab 01/05/24  0807 01/05/24  0342 01/05/24  0335 01/04/24  1929 01/04/24  1550 01/04/24  1221   * 155* 151* 162* 162* 182*     Liver Function Tests  Recent Labs   Lab 01/05/24  0335 01/04/24  0940 01/03/24  1324   AST 36 30 32   ALT 21 26 31   ALKPHOS 82 111 137   BILITOTAL 0.9 0.9 0.7   ALBUMIN 3.7 3.8 4.2     Pancreatic Enzymes  Recent Labs   Lab 01/03/24  1324   LIPASE 19     Coagulation Profile  No  lab results found in last 7 days.  Cultures  No lab results found in last 7 days.  Lab Results   Component Value Date/Time    CULTURE No Growth 12/16/2021 03:44 PM    CULTURE No Growth 12/16/2021 03:37 PM    CULTURE No Actinomyces isolated 08/30/2021 01:00 PM    CULTURE No Legionella species isolated 08/30/2021 11:03 AM    CULTURE No Growth 08/30/2021 11:03 AM    CULTURE Candida albicans (A) 08/30/2021 11:03 AM    CULTURE Candida parapsilosis (A) 08/30/2021 11:03 AM    CULTURE Aspergillus fumigatus (A) 08/30/2021 11:03 AM    CULTURE 1+ Normal jaxon 08/30/2021 11:03 AM    CULTURE 1+ Staphylococcus aureus (A) 08/30/2021 11:03 AM    CULTURE No Growth 08/24/2021 06:13 AM    CULTURE No Growth 08/24/2021 06:13 AM    CULTURE No anaerobic organisms isolated 07/11/2021 03:39 PM    CULTURE No Growth 07/11/2021 03:39 PM    CULTURE No Growth 07/10/2021 03:07 PM    CULTURE No Growth 07/10/2021 03:07 PM    CULT No growth 07/09/2021 06:40 AM    CULT (A) 07/08/2021 09:14 PM     On day 2, isolated in broth only:  Staphylococcus epidermidis      CULT  07/08/2021 09:14 PM     Critical Value/Significant Value, preliminary result only, called to and read back by  Neli Boswell RN 1527 7/10/21 AM       CULT No anaerobes isolated 07/08/2021 09:14 PM    CULT  07/08/2021 09:14 PM     Since this specimen was not transported in the proper anaerobic transport media, the   absence of anaerobes in this culture does not rule out the presence of anaerobes in this   specimen.      CULT No growth 07/08/2021 05:46 PM    CULT No growth 07/08/2021 04:25 PM    CULT No growth 06/30/2021 09:37 AM    CULT No growth 06/30/2021 09:32 AM    CULT No growth 06/29/2021 05:10 PM    CULT No anaerobes isolated 06/29/2021 05:10 PM    CULT Culture negative after 4 weeks 06/29/2021 05:10 PM    CULT Culture negative for acid fast bacilli 06/29/2021 05:10 PM    CULT  06/29/2021 05:10 PM     Assayed at [facility] [address] [place] [address] [phone]     CULT No growth after 4 weeks 06/29/2021 02:11 PM    CULT No growth 06/28/2021 10:41 PM    CULT (A) 06/28/2021 10:08 PM     Cultured on the 2nd day of incubation:  Staphylococcus epidermidis      CULT  06/28/2021 10:08 PM     Critical Value/Significant Value, preliminary result only, called to and read back by  Lisa Rush RN 6/30/21 @ 0427 TF      CULT  06/28/2021 10:08 PM     (Note)  POSITIVE for STAPHYLOCOCCUS EPIDERMIDIS and POSITIVE for the mecA  gene (resistant to methicillin) by [employer]igene multiplex nucleic acid  test. Final identification and antimicrobial susceptibility testing  will be verified by standard methods.    Specimen tested with Verigene multiplex, gram-positive blood culture  nucleic acid test for the following targets: Staph aureus, Staph  epidermidis, Staph lugdunensis, other Staph species, Enterococcus  faecalis, Enterococcus faecium, Streptococcus species, S. agalactiae,  S. anginosus grp., S. pneumoniae, S. pyogenes, Listeria sp., mecA  (methicillin resistance) and Lucía/B (vancomycin resistance).    Critical Value/Significant Value called to and read back by Darcie Saeed RN at 0701 6.30.21. amd      CULT No growth 05/26/2021 06:42 PM    CULT No growth 05/26/2021 06:32 PM    CULT No acid fast bacilli isolated after 6 weeks 05/26/2021 07:04 AM    CULT Heavy growth  Normal jaxon   05/25/2021 10:15 AM    CULT No growth 05/17/2021 08:13 AM    CULT No growth 05/17/2021 08:03 AM    CULT No growth 05/15/2021 03:04 PM    CULT No growth 11/04/2020 11:20 AM    CULT No growth 09/23/2020 05:18 AM    CULT No growth 09/22/2020 04:41 AM    CULT No growth 09/21/2020 07:21 AM    CULT No growth 09/20/2020 04:42 PM    CULT No growth 09/19/2020 08:24 AM    CULT (A) 09/18/2020 08:35 PM     Cultured on the 2nd day of incubation:  Staphylococcus hominis  Susceptibility testing done on previous specimen      CULT  09/18/2020 08:35 PM     Critical Value/Significant Value, preliminary result only, called to  and read back by  Tamy Leventhal RN 1724 9/20/20 AM      CULT No growth 09/18/2020 05:13 PM    CULT (A) 09/17/2020 05:37 PM     Cultured on the 1st day of incubation:  Staphylococcus hominis      CULT  09/17/2020 05:37 PM     Critical Value/Significant Value, preliminary result only, called to and read back by  John Howard RN @1414 09/18/2020 hdh/lm      CULT  09/17/2020 05:37 PM     (Note)  POSITIVE for Staphylococci other than S.aureus, S.epidermidis and  S.lugdunensis, by [employer] multiplex nucleic acid test.  Coagulase-negative staphylococci are the most common venipuncture or  collection associated skin CONTAMINANTS grown in blood cultures.  Final identification and antimicrobial susceptibility testing will be  verified by standard methods.    Specimen tested with Verigene multiplex, gram-positive blood culture  nucleic acid test for the following targets: Staph aureus, Staph  epidermidis, Staph lugdunensis, other Staph species, Enterococcus  faecalis, Enterococcus faecium, Streptococcus species, S. agalactiae,  S. anginosus grp., S. pneumoniae, S. pyogenes, Listeria sp., mecA  (methicillin resistance) and Lucía/B (vancomycin resistance).    Critical Value/Significant Value called to and read back by Le Carmona RN. @1712. 9.18.20. BS.       CULT No growth 09/17/2020 03:54 PM    CULT No growth 01/21/2020 04:00 PM    CULT (A) 01/21/2020 04:24 AM     Heavy growth  Haemophilus influenzae  Beta lactamase negative  Beta-lactamase negative Haemophilus influenzae are usually susceptible to ampicillin,   amoxacillin/clavulanic acid, levofloxacin, and 3rd generation cephalosporins, such as   ceftriaxone.      CULT No growth 01/20/2020 07:19 PM    CULT No growth 01/20/2020 05:59 PM    CULT (A) 01/16/2020 05:03 AM     10,000 to 50,000 colonies/mL  Coagulase negative Staphylococcus      CULT No growth 01/15/2020 10:26 PM    CULT (A) 01/15/2020 10:08 PM     Cultured on the 1st day of incubation:  Staphylococcus  epidermidis      CULT  01/15/2020 10:08 PM     Critical Value/Significant Value, preliminary result only, called to and read back by  Alphonse Cuba RN @ 1920. 1/16/20. AV      CULT  01/15/2020 10:08 PM     (Note)  POSITIVE for STAPHYLOCOCCUS EPIDERMIDIS and NEGATIVE for the mecA  gene (not resistant to methicillin) by Verigene nucleic acid test.  The mecA gene was not detected. Final identification and  antimicrobial susceptibility testing will be verified by standard  methods.    Specimen tested with Verigene multiplex, gram-positive blood culture  nucleic acid test for the following targets: Staph aureus, Staph  epidermidis, Staph lugdunensis, other Staph species, Enterococcus  faecalis, Enterococcus faecium, Streptococcus species, S. agalactiae,  S. anginosus grp., S. pneumoniae, S. pyogenes, Listeria sp., mecA  (methicillin resistance) and Lucía/B (vancomycin resistance).    Critical Value/Significant Value called to and read back by Dr. Leos, @8015 01/16/20..      CULT No growth 04/13/2018 02:36 PM    CULT No growth 04/13/2018 02:35 PM    CULT No growth 05/24/2017 08:34 AM    CULT Moderate growth Normal jaxon 05/23/2017 07:30 PM    LABFRAM <25 PMNs/low power field 08/30/2021 11:03 AM    LABFRAM No organisms seen 08/30/2021 11:03 AM    LABFRAM No organisms seen 07/11/2021 03:39 PM    LABFRAM 2+ WBC seen 07/11/2021 03:39 PM    LABFRAM No organisms seen 07/10/2021 03:07 PM    LABFRAM 2+ WBC seen 07/10/2021 03:07 PM         IMAGING:   Recent Results (from the past 24 hour(s))   XR Abdomen Port 1 View    Narrative    EXAM: XR ABDOMEN PORT 1 VIEW  1/4/2024 8:30 PM     HISTORY:  Eval NGT positioning; Hx Sylvester-en-y       TECHNIQUE: Single frontal radiograph of the abdomen    COMPARISON:  CT 1/3/2024    FINDINGS:   AP portable semiupright radiograph of the abdomen. Gastric tube tip  seen within the lower thoracic esophagus. Majority of the abdomen and  pelvis is clipped. Postsurgical changes of the upper  abdomen.      Impression    IMPRESSION:   Gastric tube tip seen within the lower thoracic esophagus. Otherwise,  limited evaluation of the abdomen due to field-of-view.    I have personally reviewed the examination and initial interpretation  and I agree with the findings.    ILYA HOBBS MD         SYSTEM ID:  [id number]   XR Abdomen Port 1 View    Narrative    EXAM: XR ABDOMEN PORT 1 VIEW  1/4/2024 9:15 PM     HISTORY:  s/p NGT advancement 10 cm       TECHNIQUE: Single frontal radiograph of the abdomen    COMPARISON:  1/4/2024    FINDINGS:   AP portable supine radiograph of the abdomen. Gastric tube side hole  seen within the distal thoracic esophagus with tip in the stomach.  Postoperative changes of Sylvester-en-Y. Otherwise, limited evaluation of  the abdomen due to field-of-view.      Impression    IMPRESSION:   Gastric tube sidehole in the distal esophagus with tip in the stomach.  Recommend advancing at least 8 cm.    I have personally reviewed the examination and initial interpretation  and I agree with the findings.    TERA LEGGETT DO         SYSTEM ID:  [id number]

## 2024-01-05 NOTE — PROGRESS NOTES
[facility]  Cardiology Heart Failure Service (Cards 2) Consult Progress Note    Patient Name: Jim Willingham    Medical Record Number: [id number]    YOB: 1955  PCP: Ricardo Gómez    Admit Date/Time: 1/3/2024 12:58 PM   2    Assessment & Plan   Jim Willingham is a 68 year old male who presents with PMH NICM OHT 5/6/2021 with complicated post transplant course who presents with SBO secondary to abdominal adhesions. He underwent laparoscopic adhesiolysis on 1/4 w/o complications. Recovering well. Still NPO with NGT.     Impression/Recommendations:     #Today's change:  - keep tacrolimus drip at the current rate. This AM tacrolimus level is not correct because there was interruption for several hours yesterday for unclear reason. Discussed with the pharmacist. We'll check tacrolimus in AM.   - c/w MMF 250mg iv q12h   - keep pt euvolemic with In/Outs.     #SBO s/p ex lap adhesiolysis  #NICM s/p OHT 5/6/2021  Rejection history:  none  AlloMap scores:  < 35  DSAs:  none  Coronary angio/Ischemic eval:  Coronary angio 5/2022 showed minimal CAD (20% stenosis in RCA), and was negative for obstruction.  Last RHC:  5/2022 showed mildly normal biventricular filling pressures with RA 5, mPA 22, PCW 9, and CI 2.9.  Echo/cMRI:  8/2023 normal biventricular function LVEF 63% and RVEF 50%  -Biopsy 5/23: cellular grade 0R and pAMR 0  CMV D+/R+  EBV D+/R+  Toxo D-/R-    #Acute hypxic RS failure suspected secondary to aspiration  #Low-grade EBV viremia  -EBV 5/23/2023: 5,913  #WALTER on CKD  #COPD  #HTN  #DM2  #HLD    PLAN:    -On abx for possible aspiration PNA per primary team  Immunosuppression:   -Continue Tacrolimus drip at 90 (goal 4-6)  -Once patient is able to have PO intake we can work on switching back to PO tacrolimus  -Repeat Tacrolimus level tomorrow morning (ordered) and we will adjust accordingly  -Continue Cellcept 250 mg BID (IV for now)    Prophylaxis:   - CAV: c/w aspirin 81 mg  daily, rosuvastatin 10 mg qhs  - GI: Protonix 40mg daily   - Bones: calcium/vitamin D       Code Status:   Full Code     Thank you for allowing me to care for this patient, please don't hesitate to contact me with any questions regarding this plan.     Patient was discussed and evaluated with Dr. Sears attending physician, who agrees with the assessment and plan above.    Alexander Palomino MD, PhD  Cardiology Fellow      Reason for consult:  Transplant medication management    History of Present Illness   Jim Willingham is a 68 year old male who presents with PMH of VT. Atrial fibrillation, CKD, DMII,COPD, NICM (S/p HM2 LVAD 6/2017) s/p OHT 5/6/2021 his post-transplant course was complicated by recurrent pleural effusions (s/p thoracenteses x2 6/2021 and 7/2021, exudative, repeatedly cultured negative), RLL cavitary lesions (per chest CT 7/14/21, BD glucan indeterminate, aspergillus negative, not started on antifungals), pericardial effusion (1.4cm per TTE 7/12/21, conservatively managed), low-grade EBV viremia, recurrent/persistent gout flare, transaminase elevation with questionable choledocholithiasis (conservatively managed with resolution), COVID-19 (8/2021, s/p monoclonal antibodies and remdesivir x5 days), and 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    Who presents on 1/3 abdominal pain for 20 hours in duration and CT findings concerning for SBO. Was taken for exploratory laparotomy and underwent adhesiolysis 1/4. Patient was extubated in the PACU. He was tachycardic in 130s on high flow satting 100%. /58. He appeared uncomfortable and was using accessory muscles.     1/5/24: No o/n events. Tacrolimus was stopped for several hours yesterday despite being on in the EMR. T level this am will be disregarded. Pt feeling better. Passing gas, No BM. Euvolemic but positive 2L in the past 24.     Past Medical History    I have reviewed this patient's medical history and  updated it with pertinent information if needed.   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 7.66 cm, Restrictive pattern , Severe mitral valve regurgitation    CECILIA (obstructive sleep apnea) 05/11/2017    Paroxysmal atrial fibrillation (H) 05/11/2017    Paroxysmal VT (H) 05/11/2017    Peripheral polyneuropathy 03/28/2023    Pulmonary cavitary lesion 11/18/2021    Rotator cuff tear arthropathy of both shoulders 11/02/2020    Type 2 diabetes mellitus with diabetic polyneuropathy (H) 03/18/2022    Uncomplicated asthma     Vitamin B12 deficiency (non anemic) 05/11/2017       Past Surgical History   I have reviewed this patient's surgical history and updated it with pertinent information if needed.  Past Surgical History:   Procedure Laterality Date    ANESTHESIA CARDIOVERSION N/A 05/11/2020    Procedure: ANESTHESIA, FOR CARDIOVERSION @1100;  Surgeon: GENERIC ANESTHESIA PROVIDER;  Location: [facility]    BRONCHOSCOPY (RIGID OR FLEXIBLE), DIAGNOSTIC N/A 8/30/2021    Procedure:  BRONCHOSCOPY, WITH BRONCHOALVEOLAR LAVAGE;  Surgeon: Perlman, David Morris, MD;  Location: [facility]    CV CORONARY ANGIOGRAM N/A 5/17/2022    Procedure: Coronary Angiogram;  Surgeon: Jeffrey Gibson MD;  Location:  HEART CARDIAC CATH LAB    CV CORONARY ANGIOGRAM N/A 5/23/2023    Procedure: Coronary Angiogram;  Surgeon: Scout Robins MD;  Location:  HEART CARDIAC CATH LAB    CV HEART BIOPSY N/A 5/13/2021    Procedure: Heart Biopsy;  Surgeon: Scout Robins MD;  Location:  HEART CARDIAC CATH LAB    CV HEART BIOPSY N/A 5/20/2021    Procedure: Heart Biopsy;  Surgeon: Jeffrey Gibson MD;  Location:  HEART CARDIAC CATH LAB    CV HEART BIOPSY N/A 5/27/2021    Procedure: Heart Biopsy;  Surgeon: Jac Dover MD;  Location:  HEART CARDIAC CATH LAB    CV HEART BIOPSY N/A 6/7/2021    Procedure: CV HEART BIOPSY;  Surgeon: Jeffrey Gibson MD;  Location:  HEART CARDIAC CATH LAB    CV HEART BIOPSY N/A 6/21/2021    Procedure: CV HEART BIOPSY;  Surgeon: Scout Robins MD;  Location:  HEART CARDIAC CATH LAB    CV HEART BIOPSY N/A 7/5/2021    Procedure: CV HEART BIOPSY;  Surgeon: Jeffrey Gibson MD;  Location:  HEART CARDIAC CATH LAB    CV HEART BIOPSY N/A 7/16/2021    Procedure: Heart Biopsy;  Surgeon: Amadeo Art MD;  Location:  HEART CARDIAC CATH LAB    CV HEART BIOPSY N/A 8/5/2021    Procedure: Heart Biopsy;  Surgeon: Jeffrey Gibson MD;  Location:  HEART CARDIAC CATH LAB    CV HEART BIOPSY N/A 8/31/2021    Procedure: Heart Cath Heart Biopsy;  Surgeon: Jeffrey Gibson MD;  Location:  HEART CARDIAC CATH LAB    CV HEART BIOPSY N/A 9/21/2021    Procedure: CV HEART BIOPSY;  Surgeon: Jeffrey Gibson MD;  Location:  HEART CARDIAC CATH LAB    CV HEART BIOPSY N/A 10/5/2021    Procedure: CV HEART BIOPSY;  Surgeon: Jeffrey Gibson MD;  Location: [facility] CARDIAC  CATH LAB    CV HEART BIOPSY N/A 11/4/2021    Procedure: CV HEART BIOPSY;  Surgeon: Nicola Seth MD;  [facility]: [facility]    CV HEART BIOPSY N/A 5/17/2022    Procedure: Heart Biopsy;  Surgeon: Jeffrey Gibson MD;  [facility]: [facility]    CV HEART BIOPSY N/A 5/23/2023    Procedure: Heart Biopsy;  Surgeon: Scout Robins MD;  [facility]    CV RIGHT HEART CATH MEASUREMENTS RECORDED N/A 07/24/2019    Procedure: CV RIGHT HEART CATH;  Surgeon: Renu Sears MD;  [facility]    CV RIGHT HEART CATH MEASUREMENTS RECORDED N/A 08/05/2020    Procedure: CV RIGHT HEART CATH;  Surgeon: Nicola Seth MD;  [facility]    CV RIGHT HEART CATH MEASUREMENTS RECORDED N/A 01/07/2021    Procedure: CV RIGHT HEART CATH;  Surgeon: Jac Dover MD;  [facility]    CV RIGHT HEART CATH MEASUREMENTS RECORDED N/A 02/23/2021    Procedure: Heart Cath Right Heart Cath;  Surgeon: Jeffrey Gibson MD;  [facility]    CV RIGHT HEART CATH MEASUREMENTS RECORDED N/A 03/23/2021    Procedure: Heart Cath Right Heart Cath. request for 3/23;  Surgeon: Jeffrey Gibson MD;  [facility]    CV RIGHT HEART CATH MEASUREMENTS RECORDED N/A 5/13/2021    Procedure: Right Heart Cath;  Surgeon: Scout Robins MD;  [facility]    CV RIGHT HEART CATH MEASUREMENTS RECORDED N/A 5/20/2021    Procedure: Right Heart Cath;  Surgeon: Jeffrey Gibson MD;  [facility]    CV RIGHT HEART CATH MEASUREMENTS RECORDED N/A 5/27/2021    Procedure: Right Heart Cath;  Surgeon: Jac Dover MD;  [facility]    CV RIGHT HEART CATH MEASUREMENTS RECORDED N/A 6/7/2021    Procedure: CV RIGHT HEART CATH;  Surgeon: PaigeJeffrey bo MD;  Location:   HEART CARDIAC CATH LAB    CV RIGHT HEART CATH MEASUREMENTS RECORDED N/A 6/21/2021    Procedure: CV RIGHT HEART CATH;  Surgeon: Scout Robins MD;  Location:  HEART CARDIAC CATH LAB    CV RIGHT HEART CATH MEASUREMENTS RECORDED N/A 7/5/2021    Procedure: CV RIGHT HEART CATH;  Surgeon: Jeffrey Gibson MD;  Location:  HEART CARDIAC CATH LAB    CV RIGHT HEART CATH MEASUREMENTS RECORDED N/A 7/16/2021    Procedure: Right Heart Cath;  Surgeon: Amadeo Art MD;  Location:  HEART CARDIAC CATH LAB    CV RIGHT HEART CATH MEASUREMENTS RECORDED N/A 8/5/2021    Procedure: CV RIGHT HEART CATH;  Surgeon: Jeffrey Gibson MD;  Location:  HEART CARDIAC CATH LAB    CV RIGHT HEART CATH MEASUREMENTS RECORDED N/A 8/31/2021    Procedure: Heart Cath Right Heart Cath;  Surgeon: Jeffrey Gibson MD;  Location:  HEART CARDIAC CATH LAB    CV RIGHT HEART CATH MEASUREMENTS RECORDED N/A 9/21/2021    Procedure: CV RIGHT HEART CATH;  Surgeon: Jeffrey Gibson MD;  Location:  HE[facility] CARDIAC CATH LAB    CV RIGHT HEART CATH MEASUREMENTS RECORDED N/A 10/5/2021    Procedure: CV RIGHT HEART CATH;  Surgeon: Jeffrey Gibson MD;  Location:  HEART CARDIAC CATH LAB    CV RIGHT HEART CATH MEASUREMENTS RECORDED N/A 11/4/2021    Procedure: CV RIGHT HEART CATH;  Surgeon: Nicola Seth MD;  Location:  HEART CARDIAC CATH LAB    CV RIGHT HEART CATH MEASUREMENTS RECORDED N/A 5/17/2022    Procedure: Right Heart Catheterization;  Surgeon: Jeffrey Gibson MD;  Location:  HEART CARDIAC CATH LAB    CV RIGHT HEART CATH MEASUREMENTS RECORDED N/A 5/23/2023    Procedure: Right Heart Catheterization;  Surgeon: Scout Robins MD;  Location:  HEART CARDIAC CATH LAB    GI SURGERY  2003    Sylvester en Y    INSERT VENTRICULAR ASSIST DEVICE LEFT (HEARTMATE II) N/A 06/19/2017    Procedure: INSERT VENTRICULAR ASSIST DEVICE LEFT (HEARTMATE II);  Median Sternotomy  Heartmate II Left Ventricular Assist Device Insertion on Pump Oxygenator;  Surgeon: Ronnie Quigley MD;  Location: [facility]    IR CHEST TUBE PLACEMENT NON-TUNNELED RIGHT  7/11/2021    LAPAROSCOPY DIAGNOSTIC (GENERAL) N/A 1/4/2024    Procedure: Diagnostic Laparoscopy, Exploratory Laparotomy with Extensive lysis of adhesions.;  Surgeon: Frederick Montgomery MD;  Location: [facility]    ORTHOPEDIC SURGERY  1994    right knee wired    PICC DOUBLE LUMEN PLACEMENT Right 09/23/2020    5FR PICC DL. Length-43cm (1cm out).    PICC INSERTION - DOUBLE LUMEN Right 05/09/2021    IK/BRACH    RELEASE CARPAL TUNNEL BILATERAL Bilateral 02/18/2021    Procedure: Bilateral carpal tunnel release;  Surgeon: Jermaine Brand MD;  Location: [facility]    TRANSPLANT HEART RECIPIENT N/A 05/05/2021    Procedure: Redo median sternotomy, lysis of adhesions, heart transplant recipient, on cardiopulmonary bypass, intraoperative transesophageal echocardiogram per anesthesia, Implantable Cardioverter Defibrillator (ICD) removal;  Surgeon: Ronnie Quigley MD;  Location: [facility]           Allergies   Allergies   Allergen Reactions    Grass Shortness Of Breath    Ace Inhibitors Cough    Cats     Dust Mites Other (See Comments)     Asthma    Mold Other (See Comments)     Asthma    Penicillins Other (See Comments)     Unknown - childhood exposure    Tolerated Zosyn 9/18-9/20/2020    Per patient report he has tolerated amoxicillin    Sulfa Antibiotics Other (See Comments) and Unknown     Unknown childhood reaction       Current medications   Current Facility-Administered Medications   Medication    albuterol (PROVENTIL HFA/VENTOLIN HFA) inhaler    dextrose 10% infusion    glucose gel 15-30 g    Or    dextrose 50 % injection 25-50 mL    Or    glucagon injection 1 mg    heparin ANTICOAGULANT injection 5,000 Units    hydrALAZINE (APRESOLINE) injection 10-20 mg    HYDROmorphone (DILAUDID) PCA 0.2 mg/mL OPIOID NAIVE    [Held by provider] insulin regular (MYXREDLIN) 1 unit/mL  infusion    ipratropium - albuterol 0.5 mg/2.5 mg/3 mL (DUONEB) neb solution 3 mL    levalbuterol (XOPENEX HFA) 45 MCG/ACT Inhaler 2 puff    Lidocaine (LIDOCARE) 4 % Patch 2 patch    lidocaine (LMX4) cream    lidocaine 1 % 0.1-1 mL    melatonin tablet 1 mg    melatonin tablet 3 mg    mycophenolate mofetil (CELLCEPT) 250 mg in D5W intermittent infusion    naloxone (NARCAN) injection 0.2 mg    Or    naloxone (NARCAN) injection 0.4 mg    Or    naloxone (NARCAN) injection 0.2 mg    Or    naloxone (NARCAN) injection 0.4 mg    ondansetron (ZOFRAN ODT) ODT tab 4 mg    Or    ondansetron (ZOFRAN) injection 4 mg    piperacillin-tazobactam (ZOSYN) 3.375 g vial to attach to  mL bag    sodium chloride (PF) 0.9% PF flush 3 mL    sodium chloride (PF) 0.9% PF flush 3 mL    tacrolimus (PROGRAF) 20 mcg/mL in D5W in non-PVC container 250 mL       Medications Prior to Admission   Medication Sig Dispense Refill Last Dose    acetaminophen (TYLENOL) 325 MG tablet Take 3 tablets (975 mg) by mouth every 6 hours as needed for other (For optimal non-opioid multimodal pain management to improve pain control.) 100 tablet 1 Past Week    albuterol (PROAIR HFA/PROVENTIL HFA/VENTOLIN HFA) 108 (90 Base) MCG/ACT inhaler Inhale 2 puffs into the lungs every 4 hours as needed for shortness of breath, wheezing or cough 40.2 g 2 1/2/2024 at PM    allopurinol (ZYLOPRIM) 300 MG tablet TAKE 1 TABLET BY MOUTH DAILY 100 tablet 3 1/2/2024 at AM    aspirin (ASA) 81 MG chewable tablet Take 1 tablet (81 mg) by mouth daily 100 tablet 1 1/2/2024 at AM    buPROPion (WELLBUTRIN) 75 MG tablet TAKE 1 TABLET(75 MG) BY MOUTH TWICE DAILY 180 tablet 3 1/2/2024 at PM    cyanocobalamin (VITAMIN B-12) 1000 MCG tablet Take 1,000 mcg by mouth daily   Past Week    diphenhydrAMINE (BENADRYL) 25 MG tablet Take 25 mg by mouth every 6 hours as needed for itching   Past Week    gabapentin (NEURONTIN) 300 MG capsule TAKE ONE CAPSULE BY MOUTH TWICE A  capsule 3 1/2/2024 at  PM    ipratropium - albuterol 0.5 mg/2.5 mg/3 mL (DUONEB) 0.5-2.5 (3) MG/3ML neb solution INHALE CONTENTS OF 1 VIAL USING NEBULIZER FOUR TIMES DAILY AS DIRECTED for 90 (Patient taking differently: Take 1 vial by nebulization every 4 hours as needed for shortness of breath, wheezing or cough) 90 mL 0 Past Month    Melatonin 10 MG CAPS Take 1 capsule by mouth At Bedtime   1/2/2024 at PM    montelukast (SINGULAIR) 10 MG tablet TAKE 1 TABLET BY MOUTH AT  BEDTIME 100 tablet 3 1/2/2024 at PM    mycophenolate (GENERIC EQUIVALENT) 250 MG capsule TAKE one CAPSULE BY MOUTH TWICE A  capsule 3 1/2/2024 at PM    rosuvastatin (CRESTOR) 10 MG tablet TAKE ONE TABLET BY MOUTH EVERY DAY 90 tablet 3 1/2/2024 at AM    tacrolimus (GENERIC) 0.5 MG capsule TAKE 1 CAPSULE BY MOUTH WITH 3  CAPSULES OF TACROLIMUS 1MG IN  THE MORNING FOR A TOTAL OF 3.5MG IN THE MORNING (Patient taking differently: TAKE 1 CAPSULE BY MOUTH WITH 3  CAPSULES OF TACROLIMUS 1MG IN  THE EVENING FOR A TOTAL OF 3.5MG IN THE EVENING) 50 capsule 6 1/2/2024 at PM    tacrolimus (GENERIC) 1 MG capsule TAKE 3 CAPSULES BY MOUTH WITH 1  TACROLIMUS 0.5MG IN THE MORNING  AND 3 CAPSULES IN THE EVENING  FOR TOTAL 3.5MG IN THE MORNING,  3MG IN THE EVENING (Patient taking differently: TAKE 3 CAPSULES BY MOUTH  IN THE MORNING  AND 3 CAPSULES IN THE EVENING  WITH 1  TACROLIMUS 0.5 MG FOR TOTAL 3 MG IN THE MORNING,  3.5 MG IN THE EVENING) 180 capsule 3 1/2/2024 at PM    traMADol (ULTRAM) 50 MG tablet TAKE ONE TABLET BY MOUTH EVERY MORNING AND AFTERNOON AND 2 AT BEDTIME (Patient taking differently: TAKE ONE TABLET BY MOUTH EVERY MORNING AND AFTERNOON AND 2 AT BEDTIME AS NEEDED FOR PAIN) 120 tablet 0 Past Week       Social History   I have reviewed this patient's social history and updated it with pertinent information if needed. Jim Willingham  reports that he quit smoking about 29 years ago. His smoking use included cigarettes. He has never been exposed to tobacco smoke. He has  never used smokeless tobacco. He reports that he does not drink alcohol and does not use drugs.    Family History   I have reviewed this patient's family history and updated it with pertinent information if needed.   Family History   Problem Relation Age of Onset    Cerebrovascular Disease Mother 64    Diabetes Mother     Hypertension Mother     Coronary Artery Disease Father     Diabetes Type 2  Father     Obesity Brother     Obesity Brother     Cerebrovascular Disease Daughter 40       Review of Systems   Review of systems not obtained due to patient factors - breathing status    Physical Exam   Temp: 98  F (36.7  C) Temp src: Oral   Pulse: (!) 123   Resp: 16 SpO2: 100 % O2 Device: None (Room air) Oxygen Delivery: 2 LPM    Vital Signs with Ranges  Temp:  [98  F (36.7  C)-99  F (37.2  C)] 98  F (36.7  C)  Pulse:  [116-130] 123  Resp:  [16-27] 16  MAP:  [79 mmHg-130 mmHg] 89 mmHg  Arterial Line BP: (121-179)/() 133/68  FiO2 (%):  [30 %-40 %] 30 %  SpO2:  [89 %-100 %] 100 %  185 lbs 0 oz    General appearance - Lying in bed; appears in no acute distress.  Head: Normocephalic and atraumatic.   Eyes: Pupils are equal, round, and reactive to light. Extraocular movement intact. No conjunctival pallor. No scleral icterus.  Neck: No JVD, bruit.  Cardiovascular: Regular rhythm. S1 and S2 auscultated. No murmurs, rub, or gallops.  Pulmonary/Chest: Normal resp effort on RA, speaking in full sentences. Clear breath sounds to auscultation bilaterally. No wheezes, crackles, or rhonchi. No chest tenderness.   Abdominal: Bowel sounds present. Soft. No distension. No rigidity, rebound or guarding. No organomegaly, hernias or palpable masses on deep palpation. No CVA tenderness.  Extremities: Warm, no cyanosis, 2+ distal pulses bilaterally. No edema.   Skin: Skin is warm and not diaphoretic. No rash, bruising, or erythema.  Neuro: Alert and oriented to person, place, and time. No focal neurological deficit.    Data   Data  reviewed today:  I personally reviewed: recent data    LAB reviewed   IMAGES Reviewed    Recent Results (from the past 24 hour(s))   XR Abdomen Port 1 View    Narrative    EXAM: XR ABDOMEN PORT 1 VIEW  1/4/2024 8:30 PM     HISTORY:  Eval NGT positioning; Hx Sylvester-en-y       TECHNIQUE: Single frontal radiograph of the abdomen    COMPARISON:  CT 1/3/2024    FINDINGS:   AP portable semiupright radiograph of the abdomen. Gastric tube tip  seen within the lower thoracic esophagus. Majority of the abdomen and  pelvis is clipped. Postsurgical changes of the upper abdomen.      Impression    IMPRESSION:   Gastric tube tip seen within the lower thoracic esophagus. Otherwise,  limited evaluation of the abdomen due to field-of-view.    I have personally reviewed the examination and initial interpretation  and I agree with the findings.    ILYA HOBBS MD         SYSTEM ID:  [id number]   XR Abdomen Port 1 View    Narrative    EXAM: XR ABDOMEN PORT 1 VIEW  1/4/2024 9:15 PM     HISTORY:  s/p NGT advancement 10 cm       TECHNIQUE: Single frontal radiograph of the abdomen    COMPARISON:  1/4/2024    FINDINGS:   AP portable supine radiograph of the abdomen. Gastric tube side hole  seen within the distal thoracic esophagus with tip in the stomach.  Postoperative changes of Sylvester-en-Y. Otherwise, limited evaluation of  the abdomen due to field-of-view.      Impression    IMPRESSION:   Gastric tube sidehole in the distal esophagus with tip in the stomach.  Recommend advancing at least 8 cm.    I have personally reviewed the examination and initial interpretation  and I agree with the findings.    TERA LEGGETT DO         SYSTEM ID:  [id number]

## 2024-01-05 NOTE — PLAN OF CARE
ICU End of Shift Summary. See flowsheets for vital signs and detailed assessment.    Changes this shift: Tachycardiac, Calcium replaced, on HFNC, accessory muscle use, PRN Duonebs, restless, impulsive, sat at edge of bed most of the shift,  bolus once. PCA dilaudid started, Abdominal lap sites old bleeding area marked. NGT to LIS    Plan:    Trend ABG's, follow labs, PCA dilaudid.          Goal Outcome Evaluation:      Plan of Care Reviewed With: patient    Overall Patient Progress: no changeOverall Patient Progress: no change    Outcome Evaluation: titrate oxygen down, manage pain with PCA

## 2024-01-05 NOTE — PROGRESS NOTES
Surgery Note  01/05/24     Mental status improved. Complains of incisional pain, especially with coughing. Minimally bloated. Has passed a small amount of flatus, no BM. No nausea. Russell removed, voiding on his own.     Vitals reviewed,  and /72    GEN: AOx3, in chair   CV: Tachycardic, non cyanotic   RESP: HFNC, mobilizing secretions   ABD: soft, non-tender, without guarding or rebound tenderness, incisions c/d/I with bandages in place. NGT with minimal brown output.     Labs reviewed   Ical 4.2  LA 1.4  K 4.6  Na 146  CO2 16  Cr 1.83  Ph 3.8  Mg 1.9    AM XR reviewed     AP: 68 year old M with a history of RNY-GB in 2003 presenting with SBO now status post laparoscopy converted to laparotomy and lysis of adhesions with Dr. Montgomery 1/3/2023. Operation complicated by large aspiration event.     PMH: Heart Transplant (2021), CKD, HTN, COPD, DM2, HLD, Afib     SICU admission   NPO, Continue NGT for today    Electrolyte replacement   Zosyn x5 days for aspiration pneunomia   Appreciate transplant cardiology recommendations    Prophylactic heparin   PT/OT, ambulate   Dispo: SICU for continued respiratory monitoring     Heavenly Barahona   Surgery Resident   [id number]

## 2024-01-05 NOTE — PLAN OF CARE
ICU End of Shift Summary. See flowsheets for vital signs and detailed assessment.    Changes this shift: Drowsy, Ox4. Restless while in bed, appears much more comfortable up in the chair. Dilaudid PCA for pain control. Lidocaine patches applied to abdomen. HFNC 30% 35L -> 10L oxymask. NG partially pulled out, advanced to 60. Repeat xray done, ok to use. Remains to LIS. Pt reports passing small amounts of flatus. Midline incision and lap sites with sanguinous drainage, dressings reinforced. Frequently voiding small amounts, bladder scan WDL. Na 139 -> 146, team updated. 2g calcium given.     Plan: Continue with plan of care. Monitor respiratory status. Pull arterial line?    Goal Outcome Evaluation:      Plan of Care Reviewed With: patient    Overall Patient Progress: improvingOverall Patient Progress: improving

## 2024-01-06 NOTE — PLAN OF CARE
ICU End of Shift Summary. See flowsheets for vital signs and detailed assessment.    Changes this shift: Drowsy, Ox4. Up SBA. 3L NC. Continues to c/o shortness of breath and chest congestion. Frequent PRN inhalers given. Good productive cough. Started scheduled IV robaxin. Minimal use of Dilaudid PCA. Lidocaine patches applied to abdomen. NG replaced after pt accidentally pulled it out. Xray done. Reports passing small amounts of gas. BS faint. Good UOP.     Plan: Continue with plan of care.

## 2024-01-06 NOTE — PROGRESS NOTES
IP PT Evaluation:    Gurwinder Duran PT  Pager #[phone]     01/05/24 1412   Appointment Info   Signing Clinician's Name / Credentials (PT) Gurwinder Duran PT, DPT   Living Environment   People in Home spouse;grandchild(lidia)   Current Living Arrangements house  (split level)   Home Accessibility stairs to enter home;stairs within home   Number of Stairs, Main Entrance 1   Number of Stairs, Within Home, Primary seven   Stair Railings, Within Home, Primary railing on right side (ascending)   Transportation Anticipated car, drives self;family or friend will provide   Living Environment Comments Lives with spouse and granddaughter in a split-level home. 1 [address], 7 steps up to main level where all needs are met.   Self-Care   Equipment Currently Used at Home none   Fall history within last six months no   Activity/Exercise/Self-Care Comment IND with mobility and I/ADLs at baseline.   General Information   Onset of Illness/Injury or Date of Surgery 01/04/24   Referring Physician Ender Cerna MD   Patient/Family Therapy Goals Statement (PT) Return home   Pertinent History of Current Problem (include personal factors and/or comorbidities that impact the POC) Per EMR: Jim Willingham is a 68 year old male w/ PMHx significant for  laparoscopic Sylvester en y gastric bypass w/ right fascia maira autograft reinforcement of gastrojejunostomy in 2003, HTN, Afib, VT, HFrEF, NICM s/p dual chamber ICD in 2008 f/b LVAD in 2017 f/b heart transplant in May 2021, T2DM, Depression, CECILIA, CKD now presenting with abdominal pain 20 hours in duration and CT findings concerning for SBO likely 2/2 adhesive disease. He is s/p exploratory laparotomy converted to open, extensive lysis of adhesions. Procedure complicated by massive aspiration.   Existing Precautions/Restrictions abdominal   Weight-Bearing Status - LUE partial weight-bearing (% in comments)   Weight-Bearing Status - RUE partial weight-bearing (% in comments)   General Observations Pt seated  in recliner, pleasant, agreeable to session. Family visiting in room.   Cognition   Affect/Mental Status (Cognition) WNL   Posture    Posture Not impaired   Range of Motion (ROM)   Range of Motion ROM is WFL   Strength (Manual Muscle Testing)   Strength (Manual Muscle Testing) Deficits observed during functional mobility   Strength Comments Generalized deconditioning; moves all extremities against gravity; no focal deficits noted   Bed Mobility   Comment, (Bed Mobility) Impaired per clinical reasoning   Transfers   Comment, (Transfers) SBA sit<>stand   Gait/Stairs (Locomotion)   Comment, (Gait/Stairs) CGA, no AD   Balance   Balance Comments IND sitting and static stance; CGA gait without AD   Clinical Impression   Criteria for Skilled Therapeutic Intervention Yes, treatment indicated   PT Diagnosis (PT) Impaired functional mobility   Influenced by the following impairments pain, deconditioning, abd precautions   Functional limitations due to impairments bed mobility, transfers, gait, balance, stairs, activity tolerance   Clinical Presentation (PT Evaluation Complexity) stable   Clinical Presentation Rationale Clinical judgment   Clinical Decision Making (Complexity) low complexity   Planned Therapy Interventions (PT) balance training;bed mobility training;gait training;home exercise program;patient/family education;stair training;strengthening;transfer training;progressive activity/exercise;risk factor education;home program guidelines   Risk & Benefits of therapy have been explained evaluation/treatment results reviewed;care plan/treatment goals reviewed;risks/benefits reviewed;current/potential barriers reviewed;participants voiced agreement with care plan;participants included;patient   PT Total Evaluation Time   PT Eval, Low Complexity Minutes (15255) 6   Physical Therapy Goals   PT Frequency Daily   PT Predicted Duration/Target Date for Goal Attainment 01/12/24   PT Goals Bed Mobility;Transfers;Gait;Stairs    PT: Bed Mobility Independent;Supine to/from sit;Within precautions   PT: Transfers Independent;Sit to/from stand;Within precautions   PT: Gait Independent;Greater than 200 feet   PT: Stairs Modified independent;7 stairs;Rail on right   PT Discharge Planning   PT Plan Progress gait endurance, stairs*   PT Discharge Recommendation (DC Rec) home with assist   PT Rationale for DC Rec Pt mobilizing well post op, anticipate steady progress to enable discharge home with family assist. Will need to trial stairs.   PT Brief overview of current status SBA; ambulate halls with staff as able   Total Session Time   Total Session Time (sum of timed and untimed services) 6

## 2024-01-06 NOTE — PROGRESS NOTES
"    [facility]    Progress Note - MIS Service       Date of Admission:  1/3/2024    Assessment & Plan: Surgery   68 year old M with a history of RNY-GB in 2003 presenting with SBO now POD#2 status post laparoscopy converted to laparotomy and lysis of adhesions with Dr. Montgomery 1/3/2023. Minimal NGT output overnight. Acute leukocytosis of 14.2.     Plan:  - Remove NGT  - NPO, to remain in SICU  - Appreciate rest of cares per SICU team       Diet: NPO for Medical/Clinical Reasons Except for: Ice Chips, Meds    DVT Prophylaxis: Defer to primary service  Russell Catheter: Not present  Lines: None     Drains: None     Cardiac Monitoring: None  Code Status: Full Code      Clinically Significant Risk Factors         # Hypernatremia: Highest Na = 146 mmol/L in last 2 days, will monitor as appropriate  # Hypocalcemia: Lowest Ca = 7.1 mg/dL in last 2 days, will monitor and replace as appropriate         # Hypertension: Noted on problem list        # Overweight: Estimated body mass index is 26.11 kg/m  as calculated from the following:    Height as of this encounter: 1.727 m (5' 8\").    Weight as of this encounter: 77.9 kg (171 lb 11.8 oz)., PRESENT ON ADMISSION     # Financial/Environmental Concerns: none  # Asthma: noted on problem list        Disposition Plan     Expected Discharge Date: 01/06/2024                 The patient's care was discussed with the Chief Resident/Fellow.    Samantha Otero MD  [facility]  Non-urgent messages: Securely message with [employer] (more info)  Text page via [employer] Paging/Directory     ______________________________________________________________________    Interval History   NAOE. No ROBF. Minimal NGT output. Good UOP. Denies chest pain, sob, fever, and chills.    Physical Exam   Vital Signs: Temp: 98.8  F (37.1  C) Temp src: Oral BP: (!) 141/88 Pulse: (!) 122   Resp: 20 SpO2: 98 % O2 Device: Nasal cannula " with humidification Oxygen Delivery: 3 LPM  Weight: 171 lbs 11.81 oz  Intake/Output Summary (Last 24 hours) at 1/6/2024 0833  Last data filed at 1/6/2024 0700  Gross per 24 hour   Intake 1257.83 ml   Output 4225 ml   Net -2967.17 ml     Constitutional: awake, alert, cooperative, no apparent distress, and appears stated age  Cardiovascular: tachycardic with regular rhythm  GI: Soft, non-distended, appropriately tender          Data     I have personally reviewed the following data over the past 24 hrs:    14.2 (H)  \   11.4 (L)   / 189     143 111 (H) 41.0 (H) /  147 (H)   3.4 18 (L) 1.63 (H) \     ALT: 15 AST: 24 AP: 86 TBILI: 1.3 (H)   ALB: 3.5 TOT PROTEIN: 6.4 LIPASE: N/A     Procal: N/A CRP: N/A Lactic Acid: 1.0         Imaging results reviewed over the past 24 hrs:   Recent Results (from the past 24 hour(s))   XR Chest Port 1 View    Narrative    Exam: XR CHEST PORT 1 VIEW, 1/5/2024 4:09 PM    Indication: ongoing chest tightness, shortness of breath    Comparison: 1/4/2024    Findings:   Single portable AP view of the chest. Partially visualized gastric  tube. An abandoned implantable cardiac defibrillator lead is seen.  Unchanged sternotomy wires. Trachea is midline. No pneumothorax.  Increased patchy hazy pulmonary opacities particularly in the  perihilum and lower lobes. Blunting of the costophrenic angles. No  acute osseous abnormalities. Cardiac silhouette is poorly visualized.      Impression    Impression: Interval increase in patchy hazy opacities suggestive of  worsening pulmonary edema. Infection cannot be fully ruled out.    I have personally reviewed the examination and initial interpretation  and I agree with the findings.    ILYA HOBBS MD         SYSTEM ID:  [id number]   XR Abdomen Port 1 View    Narrative    EXAMINATION:  XR ABDOMEN PORT 1 VIEW 1/5/2024     COMPARISON: Abdominal radiograph 1/4/2024.    HISTORY: NG placement.    TECHNIQUE: Frontal supine view of the abdomen.    FINDINGS: Enteric  tube traversing into the lower abdomen with the tip  overlaying the  area of the stomach and sidehole projecting near the  Sylvester-en-Y sutures. Postoperative changes of Sylvester-en-Y. Prominent bowel  loops in the abdomen, within normal limits. No portal venous gas.   Please see same day chest radiograph for more detailed description of  the lower chest.      Impression    IMPRESSION: Enteric tube tip projects over the left upper quadrant,  inferior to epigastric suture possibly overlying alimentary limb post  Sylvester-en-Y gastric bypass.    I have personally reviewed the examination and initial interpretation  and I agree with the findings.    KENROY CASON MD         SYSTEM ID:  [id number]

## 2024-01-06 NOTE — PROGRESS NOTES
"Surgery Note  01/06/24     Pain well controlled. Describes abdominal soreness. No nausea. Voiding without issue. No BM nor flatus. Ambulated ayers yesterday with some SOB. No lightheadedness nor dizziness.      BP (!) 141/88   Pulse (!) 122   Temp 98.8  F (37.1  C) (Oral)   Resp 20   Ht 1.727 m (5' 8\")   Wt 77.9 kg (171 lb 11.8 oz)   SpO2 98%   BMI 26.11 kg/m       GEN: Comfortable appearing man laying in bed  CV: Tachycardic and regular, non cyanotic   RESP: NLB on 2L NC   ABD: soft, minimally tender, without guarding or rebound tenderness, incisions c/d/I with staples in place. NGT with minimal brown output.     Labs reviewed   EKG sinus    AP: 68 year old M with a history of RNY-GB in 2003 presenting with SBO now status post laparoscopy converted to laparotomy and lysis of adhesions with Dr. Montgomery 1/3/2023. Operation complicated by large aspiration event.     PMH: Heart Transplant (2021), CKD, HTN, COPD, DM2, HLD, Afib     Continue SICU with new leukocytosis and tachycardia of unknown etiology  Cards consulted, appreciate recs  Remove NGT today  Continue NPO  Zosyn x5 days for aspiration  Remainder of cares per SICU team    D/w Dr. Ulises Jackson MD  Surgery PGY-5                "

## 2024-01-06 NOTE — PROGRESS NOTES
[facility]  Cardiology Heart Failure Service (Cards 2) Consult Progress Note    Patient Name: Jim Willingham    Medical Record Number: [id number]    YOB: 1955  PCP: Ricardo Gómez    Admit Date/Time: 1/3/2024 12:58 PM   3    Assessment & Plan   Jim Willingham is a 68 year old male who presents with PMH NICM OHT 5/6/2021 with complicated post transplant course who presents with SBO secondary to abdominal adhesions. He underwent laparoscopic adhesiolysis on 1/4 w/o complications. Recovering well. Still NPO with NGT.     Impression/Recommendations:     #Today's change:  - keep tacrolimus drip at the current rate. Tacrolimus level at goal.   - c/w MMF 250mg iv q12h   - consider switching zosyn mix in D5W given rasing Na   - Of note, pt's native HR after transplanted heart is 100-115 given denervated heart.  - if still NPO, do hydralazine iv prn to keep BP < 130/90    #SBO s/p ex lap adhesiolysis  #NICM s/p OHT 5/6/2021  Rejection history:  none  AlloMap scores:  < 35  DSAs:  none  Coronary angio/Ischemic eval:  Coronary angio 5/2022 showed minimal CAD (20% stenosis in RCA), and was negative for obstruction.  Last RHC:  5/2022 showed mildly normal biventricular filling pressures with RA 5, mPA 22, PCW 9, and CI 2.9.  Echo/cMRI:  8/2023 normal biventricular function LVEF 63% and RVEF 50%  -Biopsy 5/23: cellular grade 0R and pAMR 0  CMV D+/R+  EBV D+/R+  Toxo D-/R-    #Acute hypxic RS failure suspected secondary to aspiration  #Low-grade EBV viremia  -EBV 5/23/2023: 5,913  #WALTER on CKD  #COPD  #HTN  #DM2  #HLD    PLAN:    -On abx for possible aspiration PNA per primary team  Immunosuppression:   -Continue Tacrolimus drip at 90 (goal 4-6)  -Once patient is able to have PO intake we can work on switching back to PO tacrolimus  -Repeat Tacrolimus level tomorrow morning (ordered) and we will adjust accordingly  -Continue Cellcept 250 mg BID (IV for now)    Prophylaxis:   - CAV: c/w  aspirin 81 mg daily, rosuvastatin 10 mg qhs  - GI: Protonix 40mg daily   - Bones: calcium/vitamin D       Code Status:   Full Code     Thank you for allowing me to care for this patient. Cardiology will continue to follow peripherally. Please don't hesitate to contact me with any questions regarding this plan.     Patient was discussed and evaluated with Dr. Sears attending physician, who agrees with the assessment and plan above.    Alexander Palomino MD, PhD  Cardiology Fellow      Reason for consult:  Transplant medication management    History of Present Illness   Jim Willingham is a 68 year old male who presents with PMH of VT. Atrial fibrillation, CKD, DMII,COPD, NICM (S/p HM2 LVAD 6/2017) s/p OHT 5/6/2021 his post-transplant course was complicated by recurrent pleural effusions (s/p thoracenteses x2 6/2021 and 7/2021, exudative, repeatedly cultured negative), RLL cavitary lesions (per chest CT 7/14/21, BD glucan indeterminate, aspergillus negative, not started on antifungals), pericardial effusion (1.4cm per TTE 7/12/21, conservatively managed), low-grade EBV viremia, recurrent/persistent gout flare, transaminase elevation with questionable choledocholithiasis (conservatively managed with resolution), COVID-19 (8/2021, s/p monoclonal antibodies and remdesivir x5 days), and 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    Who presents on 1/3 abdominal pain for 20 hours in duration and CT findings concerning for SBO. Was taken for exploratory laparotomy and underwent adhesiolysis 1/4. Patient was extubated in the PACU. He was tachycardic in 130s on high flow satting 100%. /58. He appeared uncomfortable and was using accessory muscles.     1/5/24: No o/n events. Tacrolimus was stopped for several hours yesterday despite being on in the EMR. T level this am will be disregarded. Pt feeling better. Passing gas, No BM. Euvolemic but positive 2L in the past 24.     1/6/24: No o/n  events. Feeling better. UOP improved. Passing gas.    Past Medical History    I have reviewed this patient's medical history and updated it with pertinent information if needed.   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 7.66 cm, Restrictive pattern , Severe mitral valve regurgitation    CECILIA (obstructive sleep apnea) 05/11/2017    Paroxysmal atrial fibrillation (H) 05/11/2017    Paroxysmal VT (H) 05/11/2017    Peripheral polyneuropathy 03/28/2023    Pulmonary cavitary lesion 11/18/2021    Rotator cuff tear arthropathy of both shoulders 11/02/2020    Type 2 diabetes mellitus with diabetic polyneuropathy (H) 03/18/2022    Uncomplicated asthma     Vitamin B12 deficiency (non anemic) 05/11/2017       Past Surgical History   I have reviewed this patient's surgical history and updated it with pertinent information if needed.  Past Surgical History:   Procedure Laterality Date    ANESTHESIA CARDIOVERSION N/A 05/11/2020    Procedure: ANESTHESIA, FOR CARDIOVERSION @1100;  Surgeon:  GENERIC ANESTHESIA PROVIDER;  Location: [facility] OR    BRONCHOSCOPY (RIGID OR FLEXIBLE), DIAGNOSTIC N/A 8/30/2021    Procedure: BRONCHOSCOPY, WITH BRONCHOALVEOLAR LAVAGE;  Surgeon: Perlman, David Morris, MD;  Location: [facility] GI    CV CORONARY ANGIOGRAM N/A 5/17/2022    Procedure: Coronary Angiogram;  Surgeon: Jeffrey Gibson MD;  Location:  HEART CARDIAC CATH LAB    CV CORONARY ANGIOGRAM N/A 5/23/2023    Procedure: Coronary Angiogram;  Surgeon: Scout Robins MD;  Location:  HEART CARDIAC CATH LAB    CV HEART BIOPSY N/A 5/13/2021    Procedure: Heart Biopsy;  Surgeon: Scout Robins MD;  Location:  HEART CARDIAC CATH LAB    CV HEART BIOPSY N/A 5/20/2021    Procedure: Heart Biopsy;  Surgeon: Jeffrey Gibson MD;  Location:  HEART CARDIAC CATH LAB    CV HEART BIOPSY N/A 5/27/2021    Procedure: Heart Biopsy;  Surgeon: Jac Dover MD;  Location:  HEART CARDIAC CATH LAB    CV HEART BIOPSY N/A 6/7/2021    Procedure: CV HEART BIOPSY;  Surgeon: Jeffrey Gibson MD;  Location:  HEART CARDIAC CATH LAB    CV HEART BIOPSY N/A 6/21/2021    Procedure: CV HEART BIOPSY;  Surgeon: Scout Robins MD;  Location:  HEART CARDIAC CATH LAB    CV HEART BIOPSY N/A 7/5/2021    Procedure: CV HEART BIOPSY;  Surgeon: Jeffrey Gibson MD;  Location:  HEART CARDIAC CATH LAB    CV HEART BIOPSY N/A 7/16/2021    Procedure: Heart Biopsy;  Surgeon: Amadeo Atr MD;  Location:  HEART CARDIAC CATH LAB    CV HEART BIOPSY N/A 8/5/2021    Procedure: Heart Biopsy;  Surgeon: Jeffrey Gibson MD;  Location:  HEART CARDIAC CATH LAB    CV HEART BIOPSY N/A 8/31/2021    Procedure: Heart Cath Heart Biopsy;  Surgeon: Jeffrey Gibson MD;  Location:  HEART CARDIAC CATH LAB    CV HEART BIOPSY N/A 9/21/2021    Procedure: CV HEART BIOPSY;  Surgeon: Jeffrey Gibson MD;  Location:  HEART CARDIAC CATH LAB    CV HEART  BIOPSY N/A 10/5/2021    Procedure: CV HEART BIOPSY;  Surgeon: Jeffrey Gibson MD;  [facility]    CV HEART BIOPSY N/A 11/4/2021    Procedure: CV HEART BIOPSY;  Surgeon: Nicola Seth MD;  [facility]    CV HEART BIOPSY N/A 5/17/2022    Procedure: Heart Biopsy;  Surgeon: Jeffrey Gibson MD;  [facility]: [facility]    CV HEART BIOPSY N/A 5/23/2023    Procedure: Heart Biopsy;  Surgeon: Scout Robins MD;  Location:  HEART CARDIAC CATH LAB    CV RIGHT HEART CATH MEASUREMENTS RECORDED N/A 07/24/2019    Procedure: CV RIGHT HEART CATH;  Surgeon: Renu Sears MD;  Location:  HEART CARDIAC CATH LAB    CV RIGHT HEART CATH MEASUREMENTS RECORDED N/A 08/05/2020    Procedure: CV RIGHT HEART CATH;  Surgeon: Nicola Seth MD;  Location:  HEART CARDIAC CATH LAB    CV RIGHT HEART CATH MEASUREMENTS RECORDED N/A 01/07/2021    Procedure: CV RIGHT HEART CATH;  Surgeon: Jac Dover MD;  Location:  HEART CARDIAC CATH LAB    CV RIGHT HEART CATH MEASUREMENTS RECORDED N/A 02/23/2021    Procedure: Heart Cath Right Heart Cath;  Surgeon: Jeffrey Gibson MD;  Location:  HEART CARDIAC CATH LAB    CV RIGHT HEART CATH MEASUREMENTS RECORDED N/A 03/23/2021    Procedure: Heart Cath Right Heart Cath. request for 3/23;  Surgeon: Jeffrey Gibson MD;  Location:  HEART CARDIAC CATH LAB    CV RIGHT HEART CATH MEASUREMENTS RECORDED N/A 5/13/2021    Procedure: Right Heart Cath;  Surgeon: Scout Robins MD;  Location:  HEART CARDIAC CATH LAB    CV RIGHT HEART CATH MEASUREMENTS RECORDED N/A 5/20/2021    Procedure: Right Heart Cath;  Surgeon: Jeffrey Gibson MD;  Location:  HEART CARDIAC CATH LAB    CV RIGHT HEART CATH MEASUREMENTS RECORDED N/A 5/27/2021    Procedure: Right Heart Cath;  Surgeon: Jac Dover MD;  Location:  HEART CARDIAC CATH LAB    CV RIGHT HEART CATH  MEASUREMENTS RECORDED N/A 6/7/2021    Procedure: CV RIGHT HEART CATH;  Surgeon: Jeffrey Gibson MD;  Location:  HEART CARDIAC CATH LAB    CV RIGHT HEART CATH MEASUREMENTS RECORDED N/A 6/21/2021    Procedure: CV RIGHT HEART CATH;  Surgeon: Scout Robins MD;  Location:  HEART CARDIAC CATH LAB    CV RIGHT HEART CATH MEASUREMENTS RECORDED N/A 7/5/2021    Procedure: CV RIGHT HEART CATH;  Surgeon: Jeffrey Gibson MD;  Location:  HEART CARDIAC CATH LAB    CV RIGHT HEART CATH MEASUREMENTS RECORDED N/A 7/16/2021    Procedure: Right Heart Cath;  Surgeon: Amadeo Art MD;  Location:  HEART CARDIAC CATH LAB    CV RIGHT HEART CATH MEASUREMENTS RECORDED N/A 8/5/2021    Procedure: CV RIGHT HEART CATH;  Surgeon: Jeffrey Gibson MD;  Location:  HEART CARDIAC CATH LAB    CV RIGHT HEART CATH MEASUREMENTS RECORDED N/A 8/31/2021    Procedure: Heart Cath Right Heart Cath;  Surgeon: Jeffrey Gibson MD;  Location:  HEART CARDIAC CATH LAB    CV RIGHT HEART CATH MEASUREMENTS RECORDED N/A 9/21/2021    Procedure: CV RIGHT HEART CATH;  Surgeon: Jeffrey Gibson MD;  Location:  HEART CARDIAC CATH LAB    CV RIGHT HEART CATH MEASUREMENTS RECORDED N/A 10/5/2021    Procedure: CV RIGHT HEART CATH;  Surgeon: Jeffrey Gibson MD;  Location:  HEART CARDIAC CATH LAB    CV RIGHT HEART CATH MEASUREMENTS RECORDED N/A 11/4/2021    Procedure: CV RIGHT HEART CATH;  Surgeon: Nicola Seth MD;  Location:  HEART CARDIAC CATH LAB    CV RIGHT HEART CATH MEASUREMENTS RECORDED N/A 5/17/2022    Procedure: Right Heart Catheterization;  Surgeon: Jeffrey Gibson MD;  Location:  HEART CARDIAC CATH LAB    CV RIGHT HEART CATH MEASUREMENTS RECORDED N/A 5/23/2023    Procedure: Right Heart Catheterization;  Surgeon: Scout Robins MD;  Location:  HEART CARDIAC CATH LAB    GI SURGERY  2003    Sylvester en Y    INSERT VENTRICULAR ASSIST  DEVICE LEFT (HEARTMATE II) N/A 06/19/2017    Procedure: INSERT VENTRICULAR ASSIST DEVICE LEFT (HEARTMATE II);  Median Sternotomy Heartmate II Left Ventricular Assist Device Insertion on Pump Oxygenator;  Surgeon: Ronnie Quigley MD;  Location: [facility] OR    IR CHEST TUBE PLACEMENT NON-TUNNELED RIGHT  7/11/2021    LAPAROSCOPY DIAGNOSTIC (GENERAL) N/A 1/4/2024    Procedure: Diagnostic Laparoscopy, Exploratory Laparotomy with Extensive lysis of adhesions.;  Surgeon: Frederick Montgomery MD;  Location: [facility] OR    ORTHOPEDIC SURGERY  1994    right knee wired    PICC DOUBLE LUMEN PLACEMENT Right 09/23/2020    5FR PICC DL. Length-43cm (1cm out).    PICC INSERTION - DOUBLE LUMEN Right 05/09/2021    IK/BRACH    RELEASE CARPAL TUNNEL BILATERAL Bilateral 02/18/2021    Procedure: Bilateral carpal tunnel release;  Surgeon: Jermaine Brand MD;  Location: [facility] OR    TRANSPLANT HEART RECIPIENT N/A 05/05/2021    Procedure: Redo median sternotomy, lysis of adhesions, heart transplant recipient, on cardiopulmonary bypass, intraoperative transesophageal echocardiogram per anesthesia, Implantable Cardioverter Defibrillator (ICD) removal;  Surgeon: Ronnie Quigley MD;  Location: [facility] OR           Allergies   Allergies   Allergen Reactions    Grass Shortness Of Breath    Ace Inhibitors Cough    Cats     Dust Mites Other (See Comments)     Asthma    Mold Other (See Comments)     Asthma    Penicillins Other (See Comments)     Unknown - childhood exposure    Tolerated Zosyn 9/18-9/20/2020    Per patient report he has tolerated amoxicillin    Sulfa Antibiotics Other (See Comments) and Unknown     Unknown childhood reaction       Current medications   Current Facility-Administered Medications   Medication    acetaminophen (TYLENOL) tablet 975 mg    albuterol (PROVENTIL HFA/VENTOLIN HFA) inhaler    buPROPion (WELLBUTRIN) tablet 75 mg    dextrose 10% infusion    glucose gel 15-30 g    Or    dextrose 50 % injection 25-50 mL    Or    glucagon injection 1 mg     gabapentin (NEURONTIN) capsule 300 mg    heparin ANTICOAGULANT injection 5,000 Units    hydrALAZINE (APRESOLINE) injection 10-20 mg    HYDROmorphone (DILAUDID) injection 0.2 mg    insulin aspart (NovoLOG) injection (RAPID ACTING)    ipratropium - albuterol 0.5 mg/2.5 mg/3 mL (DUONEB) neb solution 3 mL    lactated ringers BOLUS 1,000 mL    levalbuterol (XOPENEX HFA) 45 MCG/ACT Inhaler 2 puff    Lidocaine (LIDOCARE) 4 % Patch 2 patch    lidocaine (LMX4) cream    lidocaine 1 % 0.1-1 mL    melatonin tablet 1 mg    melatonin tablet 3 mg    methocarbamol (ROBAXIN) injection 1,000 mg    mycophenolate mofetil (CELLCEPT) 250 mg in D5W intermittent infusion    naloxone (NARCAN) injection 0.2 mg    Or    naloxone (NARCAN) injection 0.4 mg    Or    naloxone (NARCAN) injection 0.2 mg    Or    naloxone (NARCAN) injection 0.4 mg    ondansetron (ZOFRAN ODT) ODT tab 4 mg    Or    ondansetron (ZOFRAN) injection 4 mg    piperacillin-tazobactam (ZOSYN) 3.375 g vial to attach to  mL bag    potassium chloride 10 mEq in 100 mL sterile water infusion    rosuvastatin (CRESTOR) tablet 10 mg    sodium chloride (OCEAN) 0.65 % nasal spray 1 spray    sodium chloride (PF) 0.9% PF flush 3 mL    sodium chloride (PF) 0.9% PF flush 3 mL    tacrolimus (PROGRAF) 20 mcg/mL in D5W in non-PVC container 250 mL       Medications Prior to Admission   Medication Sig Dispense Refill Last Dose    acetaminophen (TYLENOL) 325 MG tablet Take 3 tablets (975 mg) by mouth every 6 hours as needed for other (For optimal non-opioid multimodal pain management to improve pain control.) 100 tablet 1 Past Week    albuterol (PROAIR HFA/PROVENTIL HFA/VENTOLIN HFA) 108 (90 Base) MCG/ACT inhaler Inhale 2 puffs into the lungs every 4 hours as needed for shortness of breath, wheezing or cough 40.2 g 2 1/2/2024 at PM    allopurinol (ZYLOPRIM) 300 MG tablet TAKE 1 TABLET BY MOUTH DAILY 100 tablet 3 1/2/2024 at AM    aspirin (ASA) 81 MG chewable tablet Take 1 tablet (81 mg) by  mouth daily 100 tablet 1 1/2/2024 at AM    buPROPion (WELLBUTRIN) 75 MG tablet TAKE 1 TABLET(75 MG) BY MOUTH TWICE DAILY 180 tablet 3 1/2/2024 at PM    cyanocobalamin (VITAMIN B-12) 1000 MCG tablet Take 1,000 mcg by mouth daily   Past Week    diphenhydrAMINE (BENADRYL) 25 MG tablet Take 25 mg by mouth every 6 hours as needed for itching   Past Week    gabapentin (NEURONTIN) 300 MG capsule TAKE ONE CAPSULE BY MOUTH TWICE A  capsule 3 1/2/2024 at PM    ipratropium - albuterol 0.5 mg/2.5 mg/3 mL (DUONEB) 0.5-2.5 (3) MG/3ML neb solution INHALE CONTENTS OF 1 VIAL USING NEBULIZER FOUR TIMES DAILY AS DIRECTED for 90 (Patient taking differently: Take 1 vial by nebulization every 4 hours as needed for shortness of breath, wheezing or cough) 90 mL 0 Past Month    Melatonin 10 MG CAPS Take 1 capsule by mouth At Bedtime   1/2/2024 at PM    montelukast (SINGULAIR) 10 MG tablet TAKE 1 TABLET BY MOUTH AT  BEDTIME 100 tablet 3 1/2/2024 at PM    mycophenolate (GENERIC EQUIVALENT) 250 MG capsule TAKE one CAPSULE BY MOUTH TWICE A  capsule 3 1/2/2024 at PM    rosuvastatin (CRESTOR) 10 MG tablet TAKE ONE TABLET BY MOUTH EVERY DAY 90 tablet 3 1/2/2024 at AM    tacrolimus (GENERIC) 0.5 MG capsule TAKE 1 CAPSULE BY MOUTH WITH 3  CAPSULES OF TACROLIMUS 1MG IN  THE MORNING FOR A TOTAL OF 3.5MG IN THE MORNING (Patient taking differently: TAKE 1 CAPSULE BY MOUTH WITH 3  CAPSULES OF TACROLIMUS 1MG IN  THE EVENING FOR A TOTAL OF 3.5MG IN THE EVENING) 50 capsule 6 1/2/2024 at PM    tacrolimus (GENERIC) 1 MG capsule TAKE 3 CAPSULES BY MOUTH WITH 1  TACROLIMUS 0.5MG IN THE MORNING  AND 3 CAPSULES IN THE EVENING  FOR TOTAL 3.5MG IN THE MORNING,  3MG IN THE EVENING (Patient taking differently: TAKE 3 CAPSULES BY MOUTH  IN THE MORNING  AND 3 CAPSULES IN THE EVENING  WITH 1  TACROLIMUS 0.5 MG FOR TOTAL 3 MG IN THE MORNING,  3.5 MG IN THE EVENING) 180 capsule 3 1/2/2024 at PM    traMADol (ULTRAM) 50 MG tablet TAKE ONE TABLET BY MOUTH EVERY  MORNING AND AFTERNOON AND 2 AT BEDTIME (Patient taking differently: TAKE ONE TABLET BY MOUTH EVERY MORNING AND AFTERNOON AND 2 AT BEDTIME AS NEEDED FOR PAIN) 120 tablet 0 Past Week       Social History   I have reviewed this patient's social history and updated it with pertinent information if needed. Jim Willingham  reports that he quit smoking about 29 years ago. His smoking use included cigarettes. He has never been exposed to tobacco smoke. He has never used smokeless tobacco. He reports that he does not drink alcohol and does not use drugs.    Family History   I have reviewed this patient's family history and updated it with pertinent information if needed.   Family History   Problem Relation Age of Onset    Cerebrovascular Disease Mother 64    Diabetes Mother     Hypertension Mother     Coronary Artery Disease Father     Diabetes Type 2  Father     Obesity Brother     Obesity Brother     Cerebrovascular Disease Daughter 40       Review of Systems   Review of systems not obtained due to patient factors - breathing status    Physical Exam   Temp: 98.8  F (37.1  C) Temp src: Oral BP: (!) 141/88 Pulse: (!) 122   Resp: 20 SpO2: 98 % O2 Device: Nasal cannula with humidification Oxygen Delivery: 3 LPM    Vital Signs with Ranges  Temp:  [98  F (36.7  C)-99.9  F (37.7  C)] 98.8  F (37.1  C)  Pulse:  [122-137] 122  Resp:  [15-23] 20  BP: (129-155)/() 141/88  MAP:  [92 mmHg-100 mmHg] 100 mmHg  Arterial Line BP: (139-149)/(69-77) 149/77  SpO2:  [89 %-100 %] 98 %  171 lbs 11.81 oz    General appearance - Lying in bed; appears in no acute distress.  Head: Normocephalic and atraumatic.   Eyes: Pupils are equal, round, and reactive to light. Extraocular movement intact. No conjunctival pallor. No scleral icterus.  Neck: No JVD, bruit.  Cardiovascular: Regular rhythm. S1 and S2 auscultated. No murmurs, rub, or gallops.  Pulmonary/Chest: Normal resp effort on RA, speaking in full sentences. Clear breath sounds to  auscultation bilaterally. No wheezes, crackles, or rhonchi. No chest tenderness.   Abdominal: Bowel sounds present. Soft. No distension. No rigidity, rebound or guarding. No organomegaly, hernias or palpable masses on deep palpation. No CVA tenderness.  Extremities: Warm, no cyanosis, 2+ distal pulses bilaterally. No edema.   Skin: Skin is warm and not diaphoretic. No rash, bruising, or erythema.  Neuro: Alert and oriented to person, place, and time. No focal neurological deficit.    Data   Data reviewed today:  I personally reviewed: recent data    LAB reviewed   IMAGES Reviewed    Recent Results (from the past 24 hour(s))   XR Chest Port 1 View    Narrative    Exam: XR CHEST PORT 1 VIEW, 1/5/2024 4:09 PM    Indication: ongoing chest tightness, shortness of breath    Comparison: 1/4/2024    Findings:   Single portable AP view of the chest. Partially visualized gastric  tube. An abandoned implantable cardiac defibrillator lead is seen.  Unchanged sternotomy wires. Trachea is midline. No pneumothorax.  Increased patchy hazy pulmonary opacities particularly in the  perihilum and lower lobes. Blunting of the costophrenic angles. No  acute osseous abnormalities. Cardiac silhouette is poorly visualized.      Impression    Impression: Interval increase in patchy hazy opacities suggestive of  worsening pulmonary edema. Infection cannot be fully ruled out.    I have personally reviewed the examination and initial interpretation  and I agree with the findings.    ILYA HOBBS MD         SYSTEM ID:  [id number]   XR Abdomen Port 1 View    Narrative    EXAMINATION:  XR ABDOMEN PORT 1 VIEW 1/5/2024     COMPARISON: Abdominal radiograph 1/4/2024.    HISTORY: NG placement.    TECHNIQUE: Frontal supine view of the abdomen.    FINDINGS: Enteric tube traversing into the lower abdomen with the tip  overlaying the  area of the stomach and sidehole projecting near the  Sylvester-en-Y sutures. Postoperative changes of Sylvester-en-Y. Prominent  bowel  loops in the abdomen, within normal limits. No portal venous gas.   Please see same day chest radiograph for more detailed description of  the lower chest.      Impression    IMPRESSION: Enteric tube tip projects over the left upper quadrant,  inferior to epigastric suture possibly overlying alimentary limb post  Sylvester-en-Y gastric bypass.    I have personally reviewed the examination and initial interpretation  and I agree with the findings.    KENROY CASON MD         SYSTEM ID:  [id number]

## 2024-01-06 NOTE — PROGRESS NOTES
SURGICAL ICU PROGRESS NOTE  01/06/2024      ASSESSMENT: Jim Willingham is a 68 year old male w/ PMHx significant for  laparoscopic Sylvester en y gastric bypass w/ right fascia maira autograft reinforcement of gastrojejunostomy in 2003, HTN, Afib, VT, HFrEF, NICM s/p dual chamber ICD in 2008 f/b LVAD in 2017 f/b heart transplant in May 2021, T2DM, Depression, CECILIA, CKD now presenting with abdominal pain 20 hours in duration and CT findings concerning for SBO likely 2/2 adhesive disease. He is s/p exploratory laparotomy converted to open, extensive lysis of adhesions. Procedure complicated by massive aspiration.      PLAN:  Changes Today:  - WBC 14 from normal, Pan cultures sent for work-up, No indication to broaden or increase abx timeline at this time  - 1L LR bolus today for slight tachycardia; -110s to be expected per cardiology given heart transplant  - Add PRN IV hydralazine for SBP goal <130    Neurological:  # Acute pain  # Encephalopathy  # Hx of depression  - Monitor neurological status. Notify provider for any acute changes in exam.  - Pain: dPCA, IV robaxin; Add tylenol and resume PTA gabapentin  - In the PACU, reportedly required Precedex and intermittent Haloperidol for management of agitation. Currently at baseline mentation.  - Resume PTA Bupropion    Pulmonary:   # Acute hypercarbic respiratory failure  # Massive aspiration, Concern for aspiration pneumonia  # Hx of CECILIA  - Large volume aspiration event during induction s/p intra op therapeutic bronchoscopy. Transitioned from HFNC to RA  - Initial ABG with mixed resp and metabolic acidosis. Respiratory acidosis improving.   - Xopenex Q4H scheduled, Albuterol, duonebs Q6H PRN  - WBC 14 from normal, Respiratory cultures sent for work-up    Cardiovascular:  # Hx of heart transplant  # Prior NICM s/p LVAD  # Hx of Atrial fibrillation  - Monitor hemodynamic status. Remains off pressors  - Normotensive and tachycardic -- HR consistently in the 120s   - No  significant s/s of clinical worsening   - Concern for possible rejection? Will discuss with Cards 2 team   - EKG showing sinus tach -- No evidence of BB withdrawal    - Will give 1L LR bolus today  - Continue Tacrolimus gtt and MMF IV    # Cardiovascular PPX  - Hold PTA ASA 81 mg daily, To be resumed per primary  - PTA rosuvastatin 10mg at bedtime resumed     Gastroenterology/Nutrition:  # Hx of RNYGB  # SBO 2/2 adhesive disease, distal danni limb kink  - Diet: NPO for Medical/Clinical Reasons Except for: Ice Chips, Meds  - Remove NGT per primary, Will therefore resume enteral medications  - Advance to sips/chips  - Increasing Tbili but no abdominal pain, follow labs     Fluids/Electrolytes/Renal:   #WALTER on CKD, Resolving  - Will monitor intake and output, 3175/24h   - Baseline creatinine 1.7, downtrending to 1.6 from 1.8 today  - No diuresis today    Endocrine:  #Stress hyperglycemia    - Goal BG <180 to optimize wound healing  - Continue MDSSI; Will need carb coverage once transitioned to diet    ID:  # Intra-op aspiration event with increased O2 requirement postop  - Continue Zosyn x5 days for aspiration pneumonia (Last day of antiobiotics: 1/8/24)  - Trend WBC daily; Elevated to 14 today  - Will panculture today given WBC elevation in setting of recent transplant  - Continue to monitor respiratory status     # Immunosuppression  - Continue Tacrolimus drip at 90 (goal 4-6)  - Continue Cellcept 250 mg BID IV  - Switch to PO once cleared for diet    Heme:  # Acute Blood Loss Anemia   # Anemia of Critical Illness   - Hemoglobin 11.8  - Transfuse if hgb <7.0 or signs/symptoms of hypoperfusion. Monitor and trend.     MSK:  # Weakness and Deconditioning of Critical Illness  - PT and OT consulted  - Calcium, Vitamin D    General Cares/Prophylaxis:    - DVT prophylaxis: Mechanical, Subcutaneous Heparin.  - PUD prophylaxis: None  - PIV x2  - Art line    Disposition:  - SICU.   - Barriers to transfer out of ICU: Ongoing  tachycardia. Anticipate transfer to floor tomorrow.     Patient seen and discussed with staff. Dr. Karthik Mo MD   General Surgery, PGY-2    ====================================    SUBJECTIVE:   Awake, alert, oriented, although slightly lethargic. Denies chest pain, shortness of breath, symptomatic tachycardia. Passing gas, making urine. No nausea or vomiting.     OBJECTIVE:   Temp:  [98  F (36.7  C)-99.9  F (37.7  C)] 98.8  F (37.1  C)  Pulse:  [122-137] 122  Resp:  [15-23] 20  BP: (129-155)/() 141/88  MAP:  [92 mmHg-100 mmHg] 100 mmHg  Arterial Line BP: (135-149)/(69-77) 149/77  SpO2:  [89 %-100 %] 98 %  FiO2 (%): 30 %  Resp: 20    Physical Exam:  General: Awake, Alert, Oriented, NAD  HEENT: NC, AT  Neuro: Grossly intact, Following all commands  CV: Sinus tachycardia, S1/S1 present  Pulm: NLB on RA, Bilateral breath sounds with some diminished at the lung bases  Abd: Soft, NT, ND, Midline incision well-approximated with dressing  : No christopher  Extremities: Warm, Moving independently    LABS:   Arterial Blood Gases   Recent Labs   Lab 01/05/24  0335 01/04/24  1713 01/04/24  0940 01/04/24  0655   PH 7.27* 7.24* 7.23* 7.16*   PCO2 40 42 44 55*   PO2 83 109* 114* 86   HCO3 19* 18* 18* 20*     Complete Blood Count   Recent Labs   Lab 01/06/24  0703 01/05/24  0335 01/04/24  0940 01/04/24  0517 01/04/24  0345 01/03/24  1324   WBC 14.2* 10.3 4.1  --   --  9.7   HGB 11.4* 11.8* 12.6* 13.1*   < > 12.4*    166 203  --   --  233    < > = values in this interval not displayed.     Renal Panel  Recent Labs   Lab 01/06/24  0703 01/05/24  0335 01/04/24  1924 01/04/24  0940 01/04/24  0517 01/04/24  0345 01/03/24  1324    146*  --  139 139   < > 138   POTASSIUM 3.4 4.6 4.7 5.3 5.6*   < > 4.7   CHLORIDE 111* 115*  --  109*  --   --  107   CO2 18* 16*  --  17*  --   --  20*   BUN 41.0* 43.2*  --  40.9*  --   --  30.1*   CR 1.63* 1.83*  --  2.05*  --   --  1.75*    < > = values in this  interval not displayed.     Glucose  Recent Labs   Lab 01/06/24  0703 01/06/24  0339 01/06/24  0003 01/05/24  2009 01/05/24  1600 01/05/24  0807   * 132* 147* 163* 159* 159*     Liver Function Tests  Recent Labs   Lab 01/06/24  0703 01/05/24  0335 01/04/24  0940 01/03/24  1324   AST 24 36 30 32   ALT 15 21 26 31   ALKPHOS 86 82 111 137   BILITOTAL 1.3* 0.9 0.9 0.7   ALBUMIN 3.5 3.7 3.8 4.2     Pancreatic Enzymes  Recent Labs   Lab 01/03/24  1324   LIPASE 19     Coagulation Profile  No lab results found in last 7 days.    IMAGING:   Recent Results (from the past 24 hour(s))   XR Chest Port 1 View    Narrative    Exam: XR CHEST PORT 1 VIEW, 1/5/2024 4:09 PM    Indication: ongoing chest tightness, shortness of breath    Comparison: 1/4/2024    Findings:   Single portable AP view of the chest. Partially visualized gastric  tube. An abandoned implantable cardiac defibrillator lead is seen.  Unchanged sternotomy wires. Trachea is midline. No pneumothorax.  Increased patchy hazy pulmonary opacities particularly in the  perihilum and lower lobes. Blunting of the costophrenic angles. No  acute osseous abnormalities. Cardiac silhouette is poorly visualized.      Impression    Impression: Interval increase in patchy hazy opacities suggestive of  worsening pulmonary edema. Infection cannot be fully ruled out.    I have personally reviewed the examination and initial interpretation  and I agree with the findings.    ILYA HOBBS MD         SYSTEM ID:  [id number]

## 2024-01-07 NOTE — PLAN OF CARE
Goal Outcome Evaluation:      Plan of Care Reviewed With: patient    Overall Patient Progress: improvingOverall Patient Progress: improving    Outcome Evaluation: Minimal oxygen. Breathing appears comofortable. Pain addressed with PRN dilaudid.    ICU End of Shift Summary. See flowsheets for vital signs and detailed assessment.    Changes this shift: Remains alert and oriented. Oxygen weaned to 2 lpm NC with frequent prn inhaler use. Oxygen saturation remained between 95-99 with minimal work of breathing. Up to chair several times and up for walk with therapy. Abdominal/Incisional pain addressed with two doses of PRN Dilaudid after CAD pump discontinued. No bowel movement, but patient states that he is occasionally passing gas. Diet advanced to ice and sips of water, which were tolerated well. NG removed. One liter of LR given due to heart rate and NPO status. Pan cultures sent due to increasing white blood cell count and work of breathing over night.     Plan: Wean oxygen as tolerated.

## 2024-01-07 NOTE — PROGRESS NOTES
SURGICAL ICU PROGRESS NOTE  01/07/2024      ASSESSMENT: Jim Willingham is a 68 year old male w/ PMHx significant for  laparoscopic Sylvester en y gastric bypass w/ right fascia maira autograft reinforcement of gastrojejunostomy in 2003, HTN, Afib, VT, HFrEF, NICM s/p dual chamber ICD in 2008 f/b LVAD in 2017 f/b heart transplant in May 2021, T2DM, Depression, CECILIA, CKD now presenting with abdominal pain 20 hours in duration and CT findings concerning for SBO likely 2/2 adhesive disease. He is s/p exploratory laparotomy converted to open, extensive lysis of adhesions. Procedure complicated by massive aspiration.      PLAN:  Changes Today:  - Tachycardia improved to expected baseline, No further interventions  - WBC 17 < 14, Pan culture pending, No broadening of antibiotics at this time  - Advance to CLD  - Transfer to floor, MIS primary     Neurological:  # Acute pain  # Encephalopathy  # Hx of depression  - Monitor neurological status. Notify provider for any acute changes in exam.  - Pain: Tylenol, PTA gabapentin, PRN dilaudid, PRN robaxin  - In the PACU, reportedly required Precedex and intermittent Haloperidol for management of agitation. Currently at baseline mentation.  - Resume PTA Bupropion    Pulmonary:   # Acute hypercarbic respiratory failure  # Massive aspiration, Concern for aspiration pneumonia  # Hx of CECILIA  - Large volume aspiration event during induction s/p intra op therapeutic bronchoscopy. Transitioned from HFNC to RA  - Initial ABG with mixed resp and metabolic acidosis. Respiratory acidosis improving.   - Xopenex Q4H scheduled, Albuterol, duonebs Q6H PRN  - WBC 17 from normal, Respiratory cultures sent for work-up    Cardiovascular:  # Hx of heart transplant  # Prior NICM s/p LVAD  # Hx of Atrial fibrillation  - Monitor hemodynamic status. Remains off pressors  - Normotensive and tachycardic -- HR consistently in the 120s  - PRN hydralazine  - Continue Tacrolimus gtt and MMF IV    # Cardiovascular  PPX  - Hold PTA ASA 81 mg daily, To be resumed per primary  - PTA rosuvastatin 10mg at bedtime resumed     Gastroenterology/Nutrition:  # Hx of RNYGB  # SBO 2/2 adhesive disease, distal danni limb kink  - Diet: NPO for Medical/Clinical Reasons Except for: Meds, Ice Chips  - Advance to CLD    Fluids/Electrolytes/Renal:   #WALTER on CKD, Resolving  - Will monitor intake and output, 3175/24h   - Follow BMP  - No diuresis today    Endocrine:  #Stress hyperglycemia    - Goal BG <180 to optimize wound healing  - Continue MDSSI; Possibly need carb coverage once transitioned to diet    ID:  # Intra-op aspiration event with increased O2 requirement postop  - Continue Zosyn x5 days for aspiration pneumonia (Last day of antiobiotics: 1/8/24)  - Trend WBC daily; Elevated to 17 today; Pancultured given WBC elevation in setting of recent transplant. Continue to follow. No changes to antibiotics at this time.   - Continue to monitor respiratory status     # Immunosuppression  - Continue Tacrolimus gtt0 (goal 4-6)  - Continue Cellcept 250 mg BID IV  - Switch to PO per primary and Cards 2    Heme:  # Acute Blood Loss Anemia   # Anemia of Critical Illness   - Hemoglobin 11.8  - Transfuse if hgb <7.0 or signs/symptoms of hypoperfusion. Monitor and trend.     MSK:  # Weakness and Deconditioning of Critical Illness  - PT and OT consulted  - Calcium, Vitamin D    General Cares/Prophylaxis:    - DVT prophylaxis: Mechanical, Subcutaneous Heparin.  - PUD prophylaxis: None  - PIV x2  - Art line    Disposition:  - Transfer to floor today    Patient seen and discussed with staff. Dr. Karthik Mo MD   General Surgery, PGY-2    ====================================    SUBJECTIVE:   Awake, alert, oriented, sitting up in chair. Denies chest pain, shortness of breath, symptomatic tachycardia. Passing gas, had a large bowel movement, making urine. No nausea or vomiting.     OBJECTIVE:   Temp:  [98.3  F (36.8  C)-99.4  F (37.4  C)]  98.6  F (37  C)  Pulse:  [111-125] 111  Resp:  [16-24] 18  BP: ()/() 139/95  SpO2:  [87 %-98 %] 98 %  Resp: 18    Physical Exam:  General: Awake, Alert, Oriented, NAD  HEENT: NC, AT  Neuro: Grossly intact, Following all commands  CV: Sinus tachycardia, S1/S1 present  Pulm: NLB on RA, Bilateral breath sounds with some diminished at the lung bases  Abd: Soft, NT, ND, Midline incision well-approximated with dressing  : No christopher  Extremities: Warm, Moving independently    LABS:   Arterial Blood Gases   Recent Labs   Lab 01/05/24  0335 01/04/24  1713 01/04/24  0940 01/04/24  0655   PH 7.27* 7.24* 7.23* 7.16*   PCO2 40 42 44 55*   PO2 83 109* 114* 86   HCO3 19* 18* 18* 20*     Complete Blood Count   Recent Labs   Lab 01/06/24  0703 01/05/24  0335 01/04/24  0940 01/04/24  0517 01/04/24  0345 01/03/24  1324   WBC 14.2* 10.3 4.1  --   --  9.7   HGB 11.4* 11.8* 12.6* 13.1*   < > 12.4*    166 203  --   --  233    < > = values in this interval not displayed.     Renal Panel  Recent Labs   Lab 01/06/24  0703 01/05/24  0335 01/04/24  1924 01/04/24  0940 01/04/24  0517 01/04/24  0345 01/03/24  1324    146*  --  139 139   < > 138   POTASSIUM 3.4 4.6 4.7 5.3 5.6*   < > 4.7   CHLORIDE 111* 115*  --  109*  --   --  107   CO2 18* 16*  --  17*  --   --  20*   BUN 41.0* 43.2*  --  40.9*  --   --  30.1*   CR 1.63* 1.83*  --  2.05*  --   --  1.75*    < > = values in this interval not displayed.     Glucose  Recent Labs   Lab 01/07/24  0505 01/06/24  2341 01/06/24  2105 01/06/24  1722 01/06/24  1228 01/06/24  0951   * 108* 108* 115* 138* 142*     Liver Function Tests  Recent Labs   Lab 01/06/24  0703 01/05/24  0335 01/04/24  0940 01/03/24  1324   AST 24 36 30 32   ALT 15 21 26 31   ALKPHOS 86 82 111 137   BILITOTAL 1.3* 0.9 0.9 0.7   ALBUMIN 3.5 3.7 3.8 4.2     Pancreatic Enzymes  Recent Labs   Lab 01/03/24  1324   LIPASE 19     Coagulation Profile  No lab results found in last 7 days.    IMAGING:   Recent  Results (from the past 24 hour(s))   Echo Complete   Result Value    LVEF  60-65%    Narrative    [id number]  [id number]  [id number]  [id number]     [facility],[place]  Echocardiography Laboratory  [address] [place] [id number]     Name: LOIS FULLER  MRN: [id number]  : 1955  Study Date: 2024 10:26 AM  Age: 68 yrs  Gender: Male  Patient Location: [facility] [place]  Reason For Study: Tachycardia  Ordering Physician: JANI TERRAZAS  Performed By: Kayleigh Slaughter     BSA: 1.9 m2  Height: 68 in  Weight: 171 lb  BP: 154/93 mmHg  ______________________________________________________________________________  Procedure  Complete Portable Echo Adult. Contrast Optison. Technically difficult  study.Extremely poor acoustic windows. Patient was given 5 ml mixture of 3 ml  Optison and 6 ml saline. 4 ml wasted. The patient's rhythm is sinus  tachycardia.  ______________________________________________________________________________  Interpretation Summary  Technically difficult study.Extremely poor acoustic windows.  The patient's rhythm is sinus tachycardia.  Global and regional left ventricular function is normal with an EF of 60-65%  Mild right ventricular dilation is present.  Global right ventricular function is borderline reduced.  No significant valvular abnormalities present.  Dilation of the inferior vena cava is present with abnormal respiratory  variation in diameter.     This study was compared with the study from 2023 .Right sided filling  pressures are higher. RV better visualized today, probably similar function  and size in the previous study as well.     ______________________________________________________________________________  Left Ventricle  Global and regional left ventricular function is normal with an EF of 60-65%.  Left ventricular wall thickness is normal. Left ventricular size is normal.  Diastolic function not assessed due to heart  transplant.     Right Ventricle  Mild right ventricular dilation is present. Global right ventricular function  is borderline reduced.     Atria  The atria cannot be assessed.     Mitral Valve  The mitral valve is normal.     Aortic Valve  On Doppler interrogation, there is no significant stenosis or regurgitation.  There is mild aortic sclerosis of the left coronary cusp.     Tricuspid Valve  The tricuspid valve is normal.     Pulmonic Valve  On Doppler interrogation, there is no significant stenosis or regurgitation.     Vessels  The aorta root is normal. Dilation of the inferior vena cava is present with  abnormal respiratory variation in diameter.     Pericardium  No pericardial effusion is present.     Miscellaneous  No significant valvular abnormalities present.     Compared to Previous Study  This study was compared with the study from 2023 . Right sided filling  pressures are higher. RV better visualized today, probably similar in function  and size in the previous study as well.  ______________________________________________________________________________  MMode/2D Measurements & Calculations  IVSd: 0.79 cm  LVIDd: 4.4 cm  LVIDs: 2.8 cm  LVPWd: 1.0 cm  FS: 37.4 %  LV mass(C)d: 131.9 grams  LV mass(C)dI: 69.0 grams/m2  Ao root diam: 3.0 cm  LVOT diam: 2.3 cm  LVOT area: 4.1 cm2  Ao root diam index Ht(cm/m): 1.7  Ao root diam index BSA (cm/m2): 1.6  RWT: 0.47     Doppler Measurements & Calculations  Ao V2 max: 108.0 cm/sec  Ao max P.7 mmHg  Ao V2 mean: 72.5 cm/sec  Ao mean P.0 mmHg  Ao V2 VTI: 14.5 cm  DEEDEE(I,D): 3.4 cm2  DEEDEE(V,D): 3.2 cm2     LV V1 max P.9 mmHg  LV V1 max: 85.4 cm/sec  LV V1 VTI: 12.0 cm  SV(LVOT): 49.1 ml  SI(LVOT): 25.7 ml/m2  TR max kishan: 239.5 cm/sec  TR max P.9 mmHg  AV Kishan Ratio (DI): 0.79  DEEDEE Index (cm2/m2): 1.8     ______________________________________________________________________________  Report approved by: Kailee LOERA 2024 01:45 PM

## 2024-01-07 NOTE — PROGRESS NOTES
[facility]  Cardiology Heart Failure Service (Cards 2) Consult Progress Note    Patient Name: Jim Willingham    Medical Record Number: [id number]    YOB: 1955  PCP: Ricardo Gómez    Admit Date/Time: 1/3/2024 12:58 PM   4    Assessment & Plan   Jim Willingham is a 68 year old male who presents with PMH NICM OHT 5/6/2021 with complicated post transplant course who presents with SBO secondary to abdominal adhesions. He underwent laparoscopic adhesiolysis on 1/4 w/o complications. Recovering well. Still NPO with NGT.     Impression/Recommendations:     #Today's change:  - Keep tacrolimus drip at the current rate. Tacrolimus level pending, will adjust accordingly  - c/w MMF 250mg iv q12h   - consider switching zosyn mix in D5W given rasing Na   - Of note, pt's native HR after transplanted heart is 100-115 given denervated heart.  - if still NPO, do hydralazine iv prn to keep BP < 130/90    #SBO s/p ex lap adhesiolysis  #NICM s/p OHT 5/6/2021  Rejection history:  none  AlloMap scores:  < 35  DSAs:  none  Coronary angio/Ischemic eval:  Coronary angio 5/2022 showed minimal CAD (20% stenosis in RCA), and was negative for obstruction.  Last RHC:  5/2022 showed mildly normal biventricular filling pressures with RA 5, mPA 22, PCW 9, and CI 2.9.  Echo/cMRI:  8/2023 normal biventricular function LVEF 63% and RVEF 50%  -Biopsy 5/23: cellular grade 0R and pAMR 0  CMV D+/R+  EBV D+/R+  Toxo D-/R-    #Acute hypxic RS failure suspected secondary to aspiration  #Low-grade EBV viremia  -EBV 5/23/2023: 5,913  #WALTER on CKD  #COPD  #HTN  #DM2  #HLD    PLAN:    -On abx for possible aspiration PNA per primary team    Immunosuppression:   -Continue Tacrolimus drip at 90 (goal 4-6)  -Once patient is able to have PO intake we can work on switching back to PO tacrolimus (might be tomorrow)  -Repeat Tacrolimus level tomorrow morning (ordered) and we will adjust accordingly  -Continue Cellcept 250 mg BID  (IV for now)    Prophylaxis:   - CAV: c/w aspirin 81 mg daily, rosuvastatin 10 mg qhs  - GI: Protonix 40mg daily   - Bones: calcium/vitamin D       Code Status:   Full Code     Thank you for allowing me to care for this patient. Cardiology will continue to follow peripherally. Please don't hesitate to contact me with any questions regarding this plan.     Patient was discussed and evaluated with Dr. Sears attending physician, who agrees with the assessment and plan above.    Josue Rainey MD  Cardiology Fellow      Reason for consult:  Transplant medication management    History of Present Illness   Jim Willingham is a 68 year old male who presents with PMH of VT. Atrial fibrillation, CKD, DMII,COPD, NICM (S/p HM2 LVAD 6/2017) s/p OHT 5/6/2021 his post-transplant course was complicated by recurrent pleural effusions (s/p thoracenteses x2 6/2021 and 7/2021, exudative, repeatedly cultured negative), RLL cavitary lesions (per chest CT 7/14/21, BD glucan indeterminate, aspergillus negative, not started on antifungals), pericardial effusion (1.4cm per TTE 7/12/21, conservatively managed), low-grade EBV viremia, recurrent/persistent gout flare, transaminase elevation with questionable choledocholithiasis (conservatively managed with resolution), COVID-19 (8/2021, s/p monoclonal antibodies and remdesivir x5 days), and 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    Who presents on 1/3 abdominal pain for 20 hours in duration and CT findings concerning for SBO. Was taken for exploratory laparotomy and underwent adhesiolysis 1/4. Patient was extubated in the PACU. He was tachycardic in 130s on high flow satting 100%. /58. He appeared uncomfortable and was using accessory muscles.     1/5/24: No o/n events. Tacrolimus was stopped for several hours yesterday despite being on in the EMR. T level this am will be disregarded. Pt feeling better. Passing gas, No BM. Euvolemic but positive 2L  in the past 24.     1/6/24: No o/n events. Feeling better. UOP improved. Passing gas.    1/7/2023: No acute events overnight on 2L NC. HR 110s, 102/68. Creatinine improving 1.43. Leukocytosis 17.7. on clear liquid diet.           Past Medical History    I have reviewed this patient's medical history and updated it with pertinent information if needed.   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 7.66 cm, Restrictive pattern , Severe mitral valve regurgitation    CECILIA (obstructive sleep apnea) 05/11/2017    Paroxysmal atrial fibrillation (H) 05/11/2017    Paroxysmal VT (H) 05/11/2017    Peripheral polyneuropathy 03/28/2023    Pulmonary cavitary lesion 11/18/2021    Rotator cuff tear arthropathy of both shoulders 11/02/2020    Type 2 diabetes mellitus with diabetic polyneuropathy (H) 03/18/2022    Uncomplicated asthma     Vitamin B12 deficiency (non anemic) 05/11/2017       Past Surgical History   I have reviewed this patient's surgical history and updated it with pertinent  information if needed.  Past Surgical History:   Procedure Laterality Date    ANESTHESIA CARDIOVERSION N/A 05/11/2020    Procedure: ANESTHESIA, FOR CARDIOVERSION @1100;  Surgeon: GENERIC ANESTHESIA PROVIDER;  Location: [facility] OR    BRONCHOSCOPY (RIGID OR FLEXIBLE), DIAGNOSTIC N/A 8/30/2021    Procedure: BRONCHOSCOPY, WITH BRONCHOALVEOLAR LAVAGE;  Surgeon: Perlman, David Morris, MD;  Location:  GI    CV CORONARY ANGIOGRAM N/A 5/17/2022    Procedure: Coronary Angiogram;  Surgeon: Jeffrey Gibson MD;  Location:  HEART CARDIAC CATH LAB    CV CORONARY ANGIOGRAM N/A 5/23/2023    Procedure: Coronary Angiogram;  Surgeon: Scout Robins MD;  Location:  HEART CARDIAC CATH LAB    CV HEART BIOPSY N/A 5/13/2021    Procedure: Heart Biopsy;  Surgeon: Scout Robins MD;  Location:  HEART CARDIAC CATH LAB    CV HEART BIOPSY N/A 5/20/2021    Procedure: Heart Biopsy;  Surgeon: Jeffrey Gibson MD;  Location:  HEART CARDIAC CATH LAB    CV HEART BIOPSY N/A 5/27/2021    Procedure: Heart Biopsy;  Surgeon: Jac Dover MD;  Location:  HEART CARDIAC CATH LAB    CV HEART BIOPSY N/A 6/7/2021    Procedure: CV HEART BIOPSY;  Surgeon: Jeffrey Gibson MD;  Location:  HEART CARDIAC CATH LAB    CV HEART BIOPSY N/A 6/21/2021    Procedure: CV HEART BIOPSY;  Surgeon: Scout Robins MD;  Location:  HEART CARDIAC CATH LAB    CV HEART BIOPSY N/A 7/5/2021    Procedure: CV HEART BIOPSY;  Surgeon: Jeffrey Gibson MD;  Location:  HEART CARDIAC CATH LAB    CV HEART BIOPSY N/A 7/16/2021    Procedure: Heart Biopsy;  Surgeon: Amadeo Art MD;  Location:  HEART CARDIAC CATH LAB    CV HEART BIOPSY N/A 8/5/2021    Procedure: Heart Biopsy;  Surgeon: Jeffrey Gibson MD;  Location:  HEART CARDIAC CATH LAB    CV HEART BIOPSY N/A 8/31/2021    Procedure: Heart Cath Heart Biopsy;  Surgeon: Jeffrey Gibson MD;  Location: [facility]  CARDIAC CATH LAB    CV HEART BIOPSY N/A 9/21/2021    Procedure: CV HEART BIOPSY;  Surgeon: Jeffrey Gibson MD;  [facility]    CV HEART BIOPSY N/A 10/5/2021    Procedure: CV HEART BIOPSY;  Surgeon: Jeffrey Gibson MD;  [facility] LAB    CV HEART BIOPSY N/A 11/4/2021    Procedure: CV HEART BIOPSY;  Surgeon: Nicola Seth MD;  [facility]    CV HEART BIOPSY N/A 5/17/2022    Procedure: Heart Biopsy;  Surgeon: Jeffrey Gibson MD;  Location:  HEART CARDIAC CATH LAB    CV HEART BIOPSY N/A 5/23/2023    Procedure: Heart Biopsy;  Surgeon: Scout Robins MD;  [facility]    CV RIGHT HEART CATH MEASUREMENTS RECORDED N/A 07/24/2019    Procedure: CV RIGHT HEART CATH;  Surgeon: Renu Sears MD;  [facility]    CV RIGHT HEART CATH MEASUREMENTS RECORDED N/A 08/05/2020    Procedure: CV RIGHT HEART CATH;  Surgeon: Nicola Seth MD;  Location:  HEART CARDIAC CATH LAB    CV RIGHT HEART CATH MEASUREMENTS RECORDED N/A 01/07/2021    Procedure: CV RIGHT HEART CATH;  Surgeon: Jac Dover MD;  [facility]    CV RIGHT HEART CATH MEASUREMENTS RECORDED N/A 02/23/2021    Procedure: Heart Cath Right Heart Cath;  Surgeon: Jeffrey Gibson MD;  Location:  HEART CARDIAC CATH LAB    CV RIGHT HEART CATH MEASUREMENTS RECORDED N/A 03/23/2021    Procedure: Heart Cath Right Heart Cath. request for 3/23;  Surgeon: Jeffrey Gibson MD;  Location:  HEART CARDIAC CATH LAB    CV RIGHT HEART CATH MEASUREMENTS RECORDED N/A 5/13/2021    Procedure: Right Heart Cath;  Surgeon: Scout Robins MD;  Location:  HEART CARDIAC CATH LAB    CV RIGHT HEART CATH MEASUREMENTS RECORDED N/A 5/20/2021    Procedure: Right Heart Cath;  Surgeon: PaigeJeffrey bo MD;  Location:  HEART CARDIAC CATH LAB    CV RIGHT  HEART CATH MEASUREMENTS RECORDED N/A 5/27/2021    Procedure: Right Heart Cath;  Surgeon: Jac Dover MD;  Location:  HEART CARDIAC CATH LAB    CV RIGHT HEART CATH MEASUREMENTS RECORDED N/A 6/7/2021    Procedure: CV RIGHT HEART CATH;  Surgeon: Jeffrey Gibson MD;  Location:  HEART CARDIAC CATH LAB    CV RIGHT HEART CATH MEASUREMENTS RECORDED N/A 6/21/2021    Procedure: CV RIGHT HEART CATH;  Surgeon: Scout Robins MD;  Location:  HEART CARDIAC CATH LAB    CV RIGHT HEART CATH MEASUREMENTS RECORDED N/A 7/5/2021    Procedure: CV RIGHT HEART CATH;  Surgeon: Jeffrey Gibson MD;  Location:  HEART CARDIAC CATH LAB    CV RIGHT HEART CATH MEASUREMENTS RECORDED N/A 7/16/2021    Procedure: Right Heart Cath;  Surgeon: Amadeo Art MD;  Location:  HEART CARDIAC CATH LAB    CV RIGHT HEART CATH MEASUREMENTS RECORDED N/A 8/5/2021    Procedure: CV RIGHT HEART CATH;  Surgeon: Jeffrey Gibson MD;  Location:  HEART CARDIAC CATH LAB    CV RIGHT HEART CATH MEASUREMENTS RECORDED N/A 8/31/2021    Procedure: Heart Cath Right Heart Cath;  Surgeon: Jeffrey Gibson MD;  Location:  HEART CARDIAC CATH LAB    CV RIGHT HEART CATH MEASUREMENTS RECORDED N/A 9/21/2021    Procedure: CV RIGHT HEART CATH;  Surgeon: Jeffrey Gibson MD;  Location:  HEART CARDIAC CATH LAB    CV RIGHT HEART CATH MEASUREMENTS RECORDED N/A 10/5/2021    Procedure: CV RIGHT HEART CATH;  Surgeon: Jeffrey Gibson MD;  Location:  HEART CARDIAC CATH LAB    CV RIGHT HEART CATH MEASUREMENTS RECORDED N/A 11/4/2021    Procedure: CV RIGHT HEART CATH;  Surgeon: Nicola Seth MD;  Location:  HEART CARDIAC CATH LAB    CV RIGHT HEART CATH MEASUREMENTS RECORDED N/A 5/17/2022    Procedure: Right Heart Catheterization;  Surgeon: Jeffrey Gibson MD;  Location:  HEART CARDIAC CATH LAB    CV RIGHT HEART CATH MEASUREMENTS RECORDED N/A 5/23/2023     Procedure: Right Heart Catheterization;  Surgeon: Scout Robins MD;  Location:  HEART CARDIAC CATH LAB    GI SURGERY  2003    [facility]    INSERT VENTRICULAR ASSIST DEVICE LEFT (HEARTMATE II) N/A 06/19/2017    Procedure: INSERT VENTRICULAR ASSIST DEVICE LEFT (HEARTMATE II);  Median Sternotomy Heartmate II Left Ventricular Assist Device Insertion on Pump Oxygenator;  Surgeon: Ronnie Quigley MD;  Location:  OR    IR CHEST TUBE PLACEMENT NON-TUNNELED RIGHT  7/11/2021    LAPAROSCOPY DIAGNOSTIC (GENERAL) N/A 1/4/2024    Procedure: Diagnostic Laparoscopy, Exploratory Laparotomy with Extensive lysis of adhesions.;  Surgeon: Frederick Montgomery MD;  Location:  OR    ORTHOPEDIC SURGERY  1994    right knee wired    PICC DOUBLE LUMEN PLACEMENT Right 09/23/2020    5FR PICC DL. Length-43cm (1cm out).    PICC INSERTION - DOUBLE LUMEN Right 05/09/2021    IK/BRACH    RELEASE CARPAL TUNNEL BILATERAL Bilateral 02/18/2021    Procedure: Bilateral carpal tunnel release;  Surgeon: Jermaine Brand MD;  Location:  OR    TRANSPLANT HEART RECIPIENT N/A 05/05/2021    Procedure: Redo median sternotomy, lysis of adhesions, heart transplant recipient, on cardiopulmonary bypass, intraoperative transesophageal echocardiogram per anesthesia, Implantable Cardioverter Defibrillator (ICD) removal;  Surgeon: Ronnie Quigley MD;  Location:  OR           Allergies   Allergies   Allergen Reactions    Grass Shortness Of Breath    Ace Inhibitors Cough    Cats     Dust Mites Other (See Comments)     Asthma    Mold Other (See Comments)     Asthma    Penicillins Other (See Comments)     Unknown - childhood exposure    Tolerated Zosyn 9/18-9/20/2020    Per patient report he has tolerated amoxicillin    Sulfa Antibiotics Other (See Comments) and Unknown     Unknown childhood reaction       Current medications   Current Facility-Administered Medications   Medication    acetaminophen (TYLENOL) tablet 975 mg    albuterol (PROVENTIL HFA/VENTOLIN HFA)  inhaler    buPROPion (WELLBUTRIN) tablet 75 mg    dextrose 10% infusion    glucose gel 15-30 g    Or    dextrose 50 % injection 25-50 mL    Or    glucagon injection 1 mg    gabapentin (NEURONTIN) capsule 300 mg    heparin ANTICOAGULANT injection 5,000 Units    hydrALAZINE (APRESOLINE) injection 5-10 mg    HYDROmorphone (DILAUDID) injection 0.2 mg    insulin aspart (NovoLOG) injection (RAPID ACTING)    ipratropium - albuterol 0.5 mg/2.5 mg/3 mL (DUONEB) neb solution 3 mL    levalbuterol (XOPENEX HFA) 45 MCG/ACT Inhaler 2 puff    Lidocaine (LIDOCARE) 4 % Patch 2 patch    lidocaine (LMX4) cream    lidocaine 1 % 0.1-1 mL    melatonin tablet 5 mg    methocarbamol (ROBAXIN) tablet 500 mg    mycophenolate mofetil (CELLCEPT) 250 mg in D5W intermittent infusion    naloxone (NARCAN) injection 0.2 mg    Or    naloxone (NARCAN) injection 0.4 mg    Or    naloxone (NARCAN) injection 0.2 mg    Or    naloxone (NARCAN) injection 0.4 mg    ondansetron (ZOFRAN ODT) ODT tab 4 mg    Or    ondansetron (ZOFRAN) injection 4 mg    piperacillin-tazobactam (ZOSYN) 3.375 g vial to attach to  mL bag    rosuvastatin (CRESTOR) tablet 10 mg    sodium chloride (OCEAN) 0.65 % nasal spray 1 spray    sodium chloride (PF) 0.9% PF flush 3 mL    sodium chloride (PF) 0.9% PF flush 3 mL    tacrolimus (PROGRAF) 20 mcg/mL in D5W in non-PVC container 250 mL       Medications Prior to Admission   Medication Sig Dispense Refill Last Dose    acetaminophen (TYLENOL) 325 MG tablet Take 3 tablets (975 mg) by mouth every 6 hours as needed for other (For optimal non-opioid multimodal pain management to improve pain control.) 100 tablet 1 Past Week    albuterol (PROAIR HFA/PROVENTIL HFA/VENTOLIN HFA) 108 (90 Base) MCG/ACT inhaler Inhale 2 puffs into the lungs every 4 hours as needed for shortness of breath, wheezing or cough 40.2 g 2 1/2/2024 at PM    allopurinol (ZYLOPRIM) 300 MG tablet TAKE 1 TABLET BY MOUTH DAILY 100 tablet 3 1/2/2024 at AM    aspirin (ASA)  81 MG chewable tablet Take 1 tablet (81 mg) by mouth daily 100 tablet 1 1/2/2024 at AM    buPROPion (WELLBUTRIN) 75 MG tablet TAKE 1 TABLET(75 MG) BY MOUTH TWICE DAILY 180 tablet 3 1/2/2024 at PM    cyanocobalamin (VITAMIN B-12) 1000 MCG tablet Take 1,000 mcg by mouth daily   Past Week    diphenhydrAMINE (BENADRYL) 25 MG tablet Take 25 mg by mouth every 6 hours as needed for itching   Past Week    gabapentin (NEURONTIN) 300 MG capsule TAKE ONE CAPSULE BY MOUTH TWICE A  capsule 3 1/2/2024 at PM    ipratropium - albuterol 0.5 mg/2.5 mg/3 mL (DUONEB) 0.5-2.5 (3) MG/3ML neb solution INHALE CONTENTS OF 1 VIAL USING NEBULIZER FOUR TIMES DAILY AS DIRECTED for 90 (Patient taking differently: Take 1 vial by nebulization every 4 hours as needed for shortness of breath, wheezing or cough) 90 mL 0 Past Month    Melatonin 10 MG CAPS Take 1 capsule by mouth At Bedtime   1/2/2024 at PM    montelukast (SINGULAIR) 10 MG tablet TAKE 1 TABLET BY MOUTH AT  BEDTIME 100 tablet 3 1/2/2024 at PM    mycophenolate (GENERIC EQUIVALENT) 250 MG capsule TAKE one CAPSULE BY MOUTH TWICE A  capsule 3 1/2/2024 at PM    rosuvastatin (CRESTOR) 10 MG tablet TAKE ONE TABLET BY MOUTH EVERY DAY 90 tablet 3 1/2/2024 at AM    tacrolimus (GENERIC) 0.5 MG capsule TAKE 1 CAPSULE BY MOUTH WITH 3  CAPSULES OF TACROLIMUS 1MG IN  THE MORNING FOR A TOTAL OF 3.5MG IN THE MORNING (Patient taking differently: TAKE 1 CAPSULE BY MOUTH WITH 3  CAPSULES OF TACROLIMUS 1MG IN  THE EVENING FOR A TOTAL OF 3.5MG IN THE EVENING) 50 capsule 6 1/2/2024 at PM    tacrolimus (GENERIC) 1 MG capsule TAKE 3 CAPSULES BY MOUTH WITH 1  TACROLIMUS 0.5MG IN THE MORNING  AND 3 CAPSULES IN THE EVENING  FOR TOTAL 3.5MG IN THE MORNING,  3MG IN THE EVENING (Patient taking differently: TAKE 3 CAPSULES BY MOUTH  IN THE MORNING  AND 3 CAPSULES IN THE EVENING  WITH 1  TACROLIMUS 0.5 MG FOR TOTAL 3 MG IN THE MORNING,  3.5 MG IN THE EVENING) 180 capsule 3 1/2/2024 at PM    traMADol  (ULTRAM) 50 MG tablet TAKE ONE TABLET BY MOUTH EVERY MORNING AND AFTERNOON AND 2 AT BEDTIME (Patient taking differently: TAKE ONE TABLET BY MOUTH EVERY MORNING AND AFTERNOON AND 2 AT BEDTIME AS NEEDED FOR PAIN) 120 tablet 0 Past Week       Social History   I have reviewed this patient's social history and updated it with pertinent information if needed. Jim Willingham  reports that he quit smoking about 29 years ago. His smoking use included cigarettes. He has never been exposed to tobacco smoke. He has never used smokeless tobacco. He reports that he does not drink alcohol and does not use drugs.    Family History   I have reviewed this patient's family history and updated it with pertinent information if needed.   Family History   Problem Relation Age of Onset    Cerebrovascular Disease Mother 64    Diabetes Mother     Hypertension Mother     Coronary Artery Disease Father     Diabetes Type 2  Father     Obesity Brother     Obesity Brother     Cerebrovascular Disease Daughter 40       Review of Systems   Review of systems not obtained due to patient factors - breathing status    Physical Exam   Temp: 99  F (37.2  C) Temp src: Oral BP: 134/73 Pulse: 112   Resp: 23 SpO2: 97 % O2 Device: Nasal cannula Oxygen Delivery: 2 LPM    Vital Signs with Ranges  Temp:  [98.6  F (37  C)-99.4  F (37.4  C)] 99  F (37.2  C)  Pulse:  [111-125] 112  Resp:  [16-24] 23  BP: ()/() 134/73  SpO2:  [87 %-98 %] 97 %  171 lbs 11.81 oz    General appearance - Lying in bed; appears in no acute distress.  Head: Normocephalic and atraumatic.   Eyes: Pupils are equal, round, and reactive to light. Extraocular movement intact. No conjunctival pallor. No scleral icterus.  Neck: No JVD, bruit.  Cardiovascular: Regular rhythm. S1 and S2 auscultated. No murmurs, rub, or gallops.  Pulmonary/Chest: Normal resp effort on RA, speaking in full sentences. Clear breath sounds to auscultation bilaterally. No wheezes, crackles, or rhonchi. No chest  tenderness.   Abdominal: Bowel sounds present. Soft. No distension. No rigidity, rebound or guarding. No organomegaly, hernias or palpable masses on deep palpation. No CVA tenderness.  Extremities: Warm, no cyanosis, 2+ distal pulses bilaterally. No edema.   Skin: Skin is warm and not diaphoretic. No rash, bruising, or erythema.  Neuro: Alert and oriented to person, place, and time. No focal neurological deficit.    Data   Data reviewed today:  I personally reviewed: recent data    LAB reviewed   IMAGES Reviewed    Recent Results (from the past 24 hour(s))   Echo Complete   Result Value    LVEF  60-65%    Narrative    [id number]  [id number]  [id number]  [id number]     [facility],[place]  Echocardiography Laboratory  [address] [place] [id number]     Name: LOIS FULLER  MRN: [id number]  : 1955  Study Date: 2024 10:26 AM  Age: 68 yrs  Gender: Male  Patient Location: [facility]  Reason For Study: Tachycardia  Ordering Physician: JANI TERRAZAS  Performed By: Kayleigh Slaughter     BSA: 1.9 m2  Height: 68 in  Weight: 171 lb  BP: 154/93 mmHg  ______________________________________________________________________________  Procedure  Complete Portable Echo Adult. Contrast Optison. Technically difficult  study.Extremely poor acoustic windows. Patient was given 5 ml mixture of 3 ml  Optison and 6 ml saline. 4 ml wasted. The patient's rhythm is sinus  tachycardia.  ______________________________________________________________________________  Interpretation Summary  Technically difficult study.Extremely poor acoustic windows.  The patient's rhythm is sinus tachycardia.  Global and regional left ventricular function is normal with an EF of 60-65%  Mild right ventricular dilation is present.  Global right ventricular function is borderline reduced.  No significant valvular abnormalities present.  Dilation of the inferior vena cava is present with abnormal  respiratory  variation in diameter.     This study was compared with the study from 1/16/2023 .Right sided filling  pressures are higher. RV better visualized today, probably similar function  and size in the previous study as well.     ______________________________________________________________________________  Left Ventricle  Global and regional left ventricular function is normal with an EF of 60-65%.  Left ventricular wall thickness is normal. Left ventricular size is normal.  Diastolic function not assessed due to heart transplant.     Right Ventricle  Mild right ventricular dilation is present. Global right ventricular function  is borderline reduced.     Atria  The atria cannot be assessed.     Mitral Valve  The mitral valve is normal.     Aortic Valve  On Doppler interrogation, there is no significant stenosis or regurgitation.  There is mild aortic sclerosis of the left coronary cusp.     Tricuspid Valve  The tricuspid valve is normal.     Pulmonic Valve  On Doppler interrogation, there is no significant stenosis or regurgitation.     Vessels  The aorta root is normal. Dilation of the inferior vena cava is present with  abnormal respiratory variation in diameter.     Pericardium  No pericardial effusion is present.     Miscellaneous  No significant valvular abnormalities present.     Compared to Previous Study  This study was compared with the study from 1/16/2023 . Right sided filling  pressures are higher. RV better visualized today, probably similar in function  and size in the previous study as well.  ______________________________________________________________________________  MMode/2D Measurements & Calculations  IVSd: 0.79 cm  LVIDd: 4.4 cm  LVIDs: 2.8 cm  LVPWd: 1.0 cm  FS: 37.4 %  LV mass(C)d: 131.9 grams  LV mass(C)dI: 69.0 grams/m2  Ao root diam: 3.0 cm  LVOT diam: 2.3 cm  LVOT area: 4.1 cm2  Ao root diam index Ht(cm/m): 1.7  Ao root diam index BSA (cm/m2): 1.6  RWT: 0.47     Doppler  Measurements & Calculations  Ao V2 max: 108.0 cm/sec  Ao max P.7 mmHg  Ao V2 mean: 72.5 cm/sec  Ao mean P.0 mmHg  Ao V2 VTI: 14.5 cm  DEEDEE(I,D): 3.4 cm2  DEEDEE(V,D): 3.2 cm2     LV V1 max P.9 mmHg  LV V1 max: 85.4 cm/sec  LV V1 VTI: 12.0 cm  SV(LVOT): 49.1 ml  SI(LVOT): 25.7 ml/m2  TR max kishan: 239.5 cm/sec  TR max P.9 mmHg  AV Kishan Ratio (DI): 0.79  DEEDEE Index (cm2/m2): 1.8     ______________________________________________________________________________  Report approved by: Kailee LOERA 2024 01:45 PM

## 2024-01-07 NOTE — PLAN OF CARE
ICU End of Shift Summary. See flowsheets for vital signs and detailed assessment.    Changes this shift: Appears to have slept well. Pain controlled with PRN dilaudid, given x1. RA to 2L NC. Minimal shortness of breath reported. Hydralazine given x2 for SBP > 130. Large bowel movement.     Plan: Continue with plan of care.

## 2024-01-07 NOTE — PROGRESS NOTES
01/07/24 1114   Appointment Info   Signing Clinician's Name / Credentials (OT) Andrew Sheth, OTR/L   Living Environment   People in Home spouse;child(lidia), dependent  (wife and 10 year old granddaughter)   Current Living Arrangements house   Transportation Anticipated car, drives self;family or friend will provide   Living Environment Comments [address], split level home (7 stairs up to bedroom and tub shower, 5 stairs downstairs)   Self-Care   Usual Activity Tolerance moderate   Current Activity Tolerance fair   Regular Exercise No   Equipment Currently Used at Home grab bar, tub/shower  (has cane, walker, and reacher if needed)   Fall history within last six months no   Activity/Exercise/Self-Care Comment Pt was I with all ADL/IADLs prior to admission including driving, cooking, cleaning, medication management, and yardwork. He was ambulating without AE, and can walk ~1 block if needed.   General Information   Referring Physician Ender Cerna MD   Patient/Family Therapy Goal Statement (OT) Return to PLOF   Additional Occupational Profile Info/Pertinent History of Current Problem 68 year old male w/ PMHx significant for  laparoscopic Sylvester en y gastric bypass w/ right fascia maira autograft reinforcement of gastrojejunostomy in 2003, HTN, Afib, VT, HFrEF, NICM s/p dual chamber ICD in 2008 f/b LVAD in 2017 f/b heart transplant in May 2021, T2DM, Depression, CECILIA, CKD now presenting with abdominal pain 20 hours in duration and CT findings concerning for SBO likely 2/2 adhesive disease. He is s/p exploratory laparotomy converted to open, extensive lysis of adhesions. Procedure complicated by massive aspiration.   Existing Precautions/Restrictions fall;abdominal   Cognitive Status Examination   Cognitive Status Comments Pt reports mild memory issues at baseline, not impacting him functionally.   Visual Perception   Visual Acuity Wears reading glasses   Sensory   Sensory Comments Neuropathy in B feet, paresthesias in B  hands due to CTS.   Activities of Daily Living   BADL Assessment/Intervention bathing;lower body dressing;grooming;toileting   Bathing Assessment/Intervention   [place] Level (Bathing) moderate assist (50% patient effort)   Comment, (Bathing) per clinical judgement   Lower Body Dressing Assessment/Training   Comment, (Lower Body Dressing) per clinical judgement   [place] Level (Lower Body Dressing) moderate assist (50% patient effort)   Grooming Assessment/Training   [place] Level (Grooming) contact guard assist   Comment, (Grooming) per clinical judgement   Toileting   [place] Level (Toileting) supervision   Clinical Impression   Criteria for Skilled Therapeutic Interventions Met (OT) Yes, treatment indicated   OT Diagnosis Decreased I in ADLs due to deconditioning   Assessment of Occupational Performance 3-5 Performance Deficits   Identified Performance Deficits dressing, bathing, toileting, functional endurance   Clinical Decision Making Complexity (OT) detailed assessment/moderate complexity   Risk & Benefits of therapy have been explained patient   OT Total Evaluation Time   OT Eval, Moderate Complexity Minutes (14586) 8

## 2024-01-07 NOTE — PROGRESS NOTES
"Surgery Note  01/07/24     Pain well controlled. No nausea. Hungry. Voiding without issue. Having BM and flatus. Ambulated ayers yesterday with PT. No lightheadedness nor dizziness.      /73   Pulse 112   Temp 99  F (37.2  C) (Oral)   Resp 23   Ht 1.727 m (5' 8\")   Wt 77.9 kg (171 lb 11.8 oz)   SpO2 97%   BMI 26.11 kg/m       GEN: Comfortable appearing man laying in bed  CV: Tachycardic and regular, non cyanotic   RESP: NLB on 2L NC   ABD: soft, minimally tender, without guarding or rebound tenderness, incisions c/d/I with staples in place.      Labs reviewed   EKG sinus    AP: 68 year old M with a history of RNY-GB in 2003 presenting with SBO now status post laparoscopy converted to laparotomy and lysis of adhesions with Dr. Montgomery 1/3/2023. Operation complicated by large aspiration event.     PMH: Heart Transplant (2021), CKD, HTN, COPD, DM2, HLD, Afib     Okay for transfer to floor  Cards consulted, appreciate recs  CLD  Zosyn x5 days for aspiration  Remainder of cares per SICU team  PT/OT    D/w Dr. Ulises Jackson MD  Surgery PGY-5                "

## 2024-01-08 NOTE — PLAN OF CARE
ICU End of Shift Summary. See flowsheets for vital signs and detailed assessment.    Changes this shift: A/Ox4, VSS on room air. C/O pain in abd relieved with Dilaudid 0.2 mg. C/O of headache given Tramadol. Up to bedside commode with  Ax1. Urine/ watery stool mixed, unmeasured. Tolerating clear liquid diet.     Plan: Transfer to [facility] when bed available.      Plan of Care Reviewed With: patient    Overall Patient Progress: improving

## 2024-01-08 NOTE — PROGRESS NOTES
[facility]  Cardiology Heart Failure Service (Cards 2) Consult Progress Note    Patient Name: Jim Willingham    Medical Record Number: [id number]    YOB: 1955  PCP: Ricardo Gómez    Admit Date/Time: 1/3/2024 12:58 PM   5    Assessment & Plan   Jim Willingham is a 68 year old male who presents with PMH NICM OHT 5/6/2021 with complicated post transplant course who presents with SBO secondary to abdominal adhesions. He underwent laparoscopic adhesiolysis on 1/4 w/o complications. Recovering well. Tolerated clear liquids since 1/7/23. Hypertensive with SBP in 140s.     Impression/Recommendations:     #Today's change:  - restart tacrolimus home dose 3.5mg AM and 3mg PM and stop tacrolimus drip 1h after given the first dose (ordered)  - check T level in AM (ordered)  - switch MMF to oral (ordered)  - start hydralazine 25mg q6h with a BP goal <130/90 (ordered)   - consider switching zosyn mix in D5W given rasing Na   - Of note, pt's native HR after transplanted heart is 100-115 given denervated heart.    #SBO s/p ex lap adhesiolysis  #NICM s/p OHT 5/6/2021  Rejection history:  none  AlloMap scores:  < 35  DSAs:  none  Coronary angio/Ischemic eval:  Coronary angio 5/2022 showed minimal CAD (20% stenosis in RCA), and was negative for obstruction.  Last RHC:  5/2022 showed mildly normal biventricular filling pressures with RA 5, mPA 22, PCW 9, and CI 2.9.  Echo/cMRI:  8/2023 normal biventricular function LVEF 63% and RVEF 50%  -Biopsy 5/23: cellular grade 0R and pAMR 0  CMV D+/R+  EBV D+/R+  Toxo D-/R-    #Acute hypxic RS failure suspected secondary to aspiration  #Low-grade EBV viremia  -EBV 5/23/2023: 5,913  #WALTER on CKD  #COPD  #HTN  #DM2  #HLD    PLAN:    -On abx for possible aspiration PNA per primary team  Immunosuppression:   - restart tacrolimus home dose 3.5mg AM and 3mg PM and stop tacrolimus drip 1h after given the first dose (ordered)  (goal 4-6)  - check T level in AM  (ordered)  - switch MMF to oral (ordered)  - start hydralazine 25mg q6h with a BP goal <130/90 (ordered)     Prophylaxis:   - CAV: c/w aspirin 81 mg daily, rosuvastatin 10 mg qhs  - GI: Protonix 40mg daily   - Bones: calcium/vitamin D       Code Status:   Full Code     Thank you for allowing me to care for this patient. Please don't hesitate to contact me with any questions regarding this plan. Cardiology will continue to follow peripherally.     Patient was discussed and evaluated with Dr. Sears attending physician, who agrees with the assessment and plan above.    Alexander Palomino MD, PhD  Cardiology Fellow      Reason for consult:  Transplant medication management    History of Present Illness   Jim Willingham is a 68 year old male who presents with PMH of VT. Atrial fibrillation, CKD, DMII,COPD, NICM (S/p HM2 LVAD 6/2017) s/p OHT 5/6/2021 his post-transplant course was complicated by recurrent pleural effusions (s/p thoracenteses x2 6/2021 and 7/2021, exudative, repeatedly cultured negative), RLL cavitary lesions (per chest CT 7/14/21, BD glucan indeterminate, aspergillus negative, not started on antifungals), pericardial effusion (1.4cm per TTE 7/12/21, conservatively managed), low-grade EBV viremia, recurrent/persistent gout flare, transaminase elevation with questionable choledocholithiasis (conservatively managed with resolution), COVID-19 (8/2021, s/p monoclonal antibodies and remdesivir x5 days), and 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    Who presents on 1/3 abdominal pain for 20 hours in duration and CT findings concerning for SBO. Was taken for exploratory laparotomy and underwent adhesiolysis 1/4. Patient was extubated in the PACU. He was tachycardic in 130s on high flow satting 100%. /58. He appeared uncomfortable and was using accessory muscles.     1/5/24: No o/n events. Tacrolimus was stopped for several hours yesterday despite being on in the EMR. T  level this am will be disregarded. Pt feeling better. Passing gas, No BM. Euvolemic but positive 2L in the past 24.     1/6/24: No o/n events. Feeling better. UOP improved. Passing gas.    1/8: No o/n events. Feeling better. Flatus, BM+, pain controlled. Tolerates clear liquids.     Past Medical History    I have reviewed this patient's medical history and updated it with pertinent information if needed.   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 7.66 cm, Restrictive pattern , Severe mitral valve regurgitation    CECILIA (obstructive sleep apnea) 05/11/2017    Paroxysmal atrial fibrillation (H) 05/11/2017    Paroxysmal VT (H) 05/11/2017    Peripheral polyneuropathy 03/28/2023    Pulmonary cavitary lesion 11/18/2021    Rotator cuff tear arthropathy of both shoulders 11/02/2020    Type 2 diabetes mellitus with diabetic polyneuropathy (H) 03/18/2022    Uncomplicated asthma     Vitamin B12 deficiency (non anemic) 05/11/2017       Past Surgical History   I have reviewed this  patient's surgical history and updated it with pertinent information if needed.  Past Surgical History:   Procedure Laterality Date    ANESTHESIA CARDIOVERSION N/A 05/11/2020    Procedure: ANESTHESIA, FOR CARDIOVERSION @1100;  Surgeon: GENERIC ANESTHESIA PROVIDER;  Location: [facility] OR    BRONCHOSCOPY (RIGID OR FLEXIBLE), DIAGNOSTIC N/A 8/30/2021    Procedure: BRONCHOSCOPY, WITH BRONCHOALVEOLAR LAVAGE;  Surgeon: Perlman, David Morris, MD;  Location:  GI    CV CORONARY ANGIOGRAM N/A 5/17/2022    Procedure: Coronary Angiogram;  Surgeon: Jeffrey Gibson MD;  Location:  HEART CARDIAC CATH LAB    CV CORONARY ANGIOGRAM N/A 5/23/2023    Procedure: Coronary Angiogram;  Surgeon: Scout Robins MD;  Location:  HEART CARDIAC CATH LAB    CV HEART BIOPSY N/A 5/13/2021    Procedure: Heart Biopsy;  Surgeon: Scout Robins MD;  Location:  HEART CARDIAC CATH LAB    CV HEART BIOPSY N/A 5/20/2021    Procedure: Heart Biopsy;  Surgeon: Jeffrey Gibson MD;  Location:  HEART CARDIAC CATH LAB    CV HEART BIOPSY N/A 5/27/2021    Procedure: Heart Biopsy;  Surgeon: Jac Dover MD;  Location:  HEART CARDIAC CATH LAB    CV HEART BIOPSY N/A 6/7/2021    Procedure: CV HEART BIOPSY;  Surgeon: Jeffrey Gibson MD;  Location:  HEART CARDIAC CATH LAB    CV HEART BIOPSY N/A 6/21/2021    Procedure: CV HEART BIOPSY;  Surgeon: Scout Robins MD;  Location:  HEART CARDIAC CATH LAB    CV HEART BIOPSY N/A 7/5/2021    Procedure: CV HEART BIOPSY;  Surgeon: Jeffrey Gibson MD;  Location:  HEART CARDIAC CATH LAB    CV HEART BIOPSY N/A 7/16/2021    Procedure: Heart Biopsy;  Surgeon: Amadeo Art MD;  Location:  HEART CARDIAC CATH LAB    CV HEART BIOPSY N/A 8/5/2021    Procedure: Heart Biopsy;  Surgeon: Jeffrey Gibson MD;  Location:  HEART CARDIAC CATH LAB    CV HEART BIOPSY N/A 8/31/2021    Procedure: Heart Cath Heart Biopsy;   Surgeon: Jeffrey Gibson MD;  Location: [facility]    CV HEART BIOPSY N/A 9/21/2021    Procedure: CV HEART BIOPSY;  Surgeon: Jeffrey Gibson MD;  Location: [facility]ARD[facility]    CV HEART BIOPSY N/A 10/5/2021    Procedure: CV HEART BIOPSY;  Surgeon: Jeffrey Gibson MD;  Location: [facility]    CV HEART BIOPSY N/A 11/4/2021    Procedure: CV HEART BIOPSY;  Surgeon: Nicola Seth MD;  Location: [facility]    CV HEART BIOPSY N/A 5/17/2022    Procedure: Heart Biopsy;  Surgeon: Jeffrey Gibson MD;  [facility]    CV HEART BIOPSY N/A 5/23/2023    Procedure: Heart Biopsy;  Surgeon: Scout Robins MD;  Location:  HEART CARDIAC CATH LAB    CV RIGHT HEART CATH MEASUREMENTS RECORDED N/A 07/24/2019    Procedure: CV RIGHT HEART CATH;  Surgeon: Renu Sears MD;  [facility]    CV RIGHT HEART CATH MEASUREMENTS RECORDED N/A 08/05/2020    Procedure: CV RIGHT HEART CATH;  Surgeon: Nicola Seth MD;  Location:  HEART CARDIAC CATH LAB    CV RIGHT HEART CATH MEASUREMENTS RECORDED N/A 01/07/2021    Procedure: CV RIGHT HEART CATH;  Surgeon: Jac Dover MD;  Location:  HEART CARDIAC CATH LAB    CV RIGHT HEART CATH MEASUREMENTS RECORDED N/A 02/23/2021    Procedure: Heart Cath Right Heart Cath;  Surgeon: Jeffrey Gibson MD;  [facility]    CV RIGHT HEART CATH MEASUREMENTS RECORDED N/A 03/23/2021    Procedure: Heart Cath Right Heart Cath. request for 3/23;  Surgeon: Jeffrey Gibson MD;  Location:  HEART CARDIAC CATH LAB    CV RIGHT HEART CATH MEASUREMENTS RECORDED N/A 5/13/2021    Procedure: Right Heart Cath;  Surgeon: Scout Robins MD;  Location:  HEART CARDIAC CATH LAB    CV RIGHT HEART CATH MEASUREMENTS RECORDED N/A 5/20/2021    Procedure: Right Heart Cath;  Surgeon: Jeffrey Gibson  MD Sue;  Location:  HEART CARDIAC CATH LAB    CV RIGHT HEART CATH MEASUREMENTS RECORDED N/A 5/27/2021    Procedure: Right Heart Cath;  Surgeon: Jac Dover MD;  Location:  HEART CARDIAC CATH LAB    CV RIGHT HEART CATH MEASUREMENTS RECORDED N/A 6/7/2021    Procedure: CV RIGHT HEART CATH;  Surgeon: Jeffrey Gibson MD;  Location:  HEART CARDIAC CATH LAB    CV RIGHT HEART CATH MEASUREMENTS RECORDED N/A 6/21/2021    Procedure: CV RIGHT HEART CATH;  Surgeon: Scout Robins MD;  Location:  HEART CARDIAC CATH LAB    CV RIGHT HEART CATH MEASUREMENTS RECORDED N/A 7/5/2021    Procedure: CV RIGHT HEART CATH;  Surgeon: Jeffrey Gibson MD;  Location:  HEART CARDIAC CATH LAB    CV RIGHT HEART CATH MEASUREMENTS RECORDED N/A 7/16/2021    Procedure: Right Heart Cath;  Surgeon: Amadeo Art MD;  Location:  HEART CARDIAC CATH LAB    CV RIGHT HEART CATH MEASUREMENTS RECORDED N/A 8/5/2021    Procedure: CV RIGHT HEART CATH;  Surgeon: Jeffrey Gibson MD;  Location:  HEART CARDIAC CATH LAB    CV RIGHT HEART CATH MEASUREMENTS RECORDED N/A 8/31/2021    Procedure: Heart Cath Right Heart Cath;  Surgeon: Jeffrey Gibson MD;  Location:  HEART CARDIAC CATH LAB    CV RIGHT HEART CATH MEASUREMENTS RECORDED N/A 9/21/2021    Procedure: CV RIGHT HEART CATH;  Surgeon: Jeffrey Gibson MD;  Location:  HEART CARDIAC CATH LAB    CV RIGHT HEART CATH MEASUREMENTS RECORDED N/A 10/5/2021    Procedure: CV RIGHT HEART CATH;  Surgeon: Jeffrey Gibson MD;  Location:  HEART CARDIAC CATH LAB    CV RIGHT HEART CATH MEASUREMENTS RECORDED N/A 11/4/2021    Procedure: CV RIGHT HEART CATH;  Surgeon: Nicola Seth MD;  Location:  HEART CARDIAC CATH LAB    CV RIGHT HEART CATH MEASUREMENTS RECORDED N/A 5/17/2022    Procedure: Right Heart Catheterization;  Surgeon: Jeffrey Gibson MD;  Location:  HEART CARDIAC CATH  LAB    CV RIGHT HEART CATH MEASUREMENTS RECORDED N/A 5/23/2023    Procedure: Right Heart Catheterization;  Surgeon: Scout Robins MD;  Location:  HEART CARDIAC CATH LAB    GI SURGERY  2003    [facility]    INSERT VENTRICULAR ASSIST DEVICE LEFT (HEARTMATE II) N/A 06/19/2017    Procedure: INSERT VENTRICULAR ASSIST DEVICE LEFT (HEARTMATE II);  Median Sternotomy Heartmate II Left Ventricular Assist Device Insertion on Pump Oxygenator;  Surgeon: Ronnie Quigley MD;  Location:  OR    IR CHEST TUBE PLACEMENT NON-TUNNELED RIGHT  7/11/2021    LAPAROSCOPY DIAGNOSTIC (GENERAL) N/A 1/4/2024    Procedure: Diagnostic Laparoscopy, Exploratory Laparotomy with Extensive lysis of adhesions.;  Surgeon: Frederick Montgomery MD;  Location:  OR    ORTHOPEDIC SURGERY  1994    right knee wired    PICC DOUBLE LUMEN PLACEMENT Right 09/23/2020    5FR PICC DL. Length-43cm (1cm out).    PICC INSERTION - DOUBLE LUMEN Right 05/09/2021    IK/BRACH    RELEASE CARPAL TUNNEL BILATERAL Bilateral 02/18/2021    Procedure: Bilateral carpal tunnel release;  Surgeon: Jermaine Brand MD;  Location:  OR    TRANSPLANT HEART RECIPIENT N/A 05/05/2021    Procedure: Redo median sternotomy, lysis of adhesions, heart transplant recipient, on cardiopulmonary bypass, intraoperative transesophageal echocardiogram per anesthesia, Implantable Cardioverter Defibrillator (ICD) removal;  Surgeon: Ronnie Quigley MD;  Location:  OR           Allergies   Allergies   Allergen Reactions    Grass Shortness Of Breath    Ace Inhibitors Cough    Cats     Dust Mites Other (See Comments)     Asthma    Mold Other (See Comments)     Asthma    Penicillins Other (See Comments)     Unknown - childhood exposure    Tolerated Zosyn 9/18-9/20/2020    Per patient report he has tolerated amoxicillin    Sulfa Antibiotics Other (See Comments) and Unknown     Unknown childhood reaction       Current medications   Current Facility-Administered Medications   Medication    acetaminophen  (TYLENOL) tablet 975 mg    albuterol (PROVENTIL HFA/VENTOLIN HFA) inhaler    buPROPion (WELLBUTRIN) tablet 75 mg    dextrose 10% infusion    glucose gel 15-30 g    Or    dextrose 50 % injection 25-50 mL    Or    glucagon injection 1 mg    gabapentin (NEURONTIN) capsule 300 mg    heparin ANTICOAGULANT injection 5,000 Units    hydrALAZINE (APRESOLINE) tablet 25 mg    HYDROmorphone (DILAUDID) injection 0.2 mg    insulin aspart (NovoLOG) injection (RAPID ACTING)    ipratropium - albuterol 0.5 mg/2.5 mg/3 mL (DUONEB) neb solution 3 mL    levalbuterol (XOPENEX HFA) 45 MCG/ACT Inhaler 2 puff    Lidocaine (LIDOCARE) 4 % Patch 2 patch    lidocaine (LMX4) cream    lidocaine 1 % 0.1-1 mL    melatonin tablet 5 mg    methocarbamol (ROBAXIN) tablet 500 mg    mycophenolate (CELLCEPT BRAND) capsule 250 mg    naloxone (NARCAN) injection 0.2 mg    Or    naloxone (NARCAN) injection 0.4 mg    Or    naloxone (NARCAN) injection 0.2 mg    Or    naloxone (NARCAN) injection 0.4 mg    ondansetron (ZOFRAN ODT) ODT tab 4 mg    Or    ondansetron (ZOFRAN) injection 4 mg    piperacillin-tazobactam (ZOSYN) 3.375 g vial to attach to  mL bag    rosuvastatin (CRESTOR) tablet 10 mg    sodium chloride (OCEAN) 0.65 % nasal spray 1 spray    sodium chloride (PF) 0.9% PF flush 3 mL    sodium chloride (PF) 0.9% PF flush 3 mL    tacrolimus (GENERIC) capsule 3 mg    tacrolimus (GENERIC) capsule 3.5 mg    tacrolimus (PROGRAF) 20 mcg/mL in D5W in non-PVC container 250 mL    traMADol (ULTRAM) half-tab  mg       Medications Prior to Admission   Medication Sig Dispense Refill Last Dose    acetaminophen (TYLENOL) 325 MG tablet Take 3 tablets (975 mg) by mouth every 6 hours as needed for other (For optimal non-opioid multimodal pain management to improve pain control.) 100 tablet 1 Past Week    albuterol (PROAIR HFA/PROVENTIL HFA/VENTOLIN HFA) 108 (90 Base) MCG/ACT inhaler Inhale 2 puffs into the lungs every 4 hours as needed for shortness of breath,  wheezing or cough 40.2 g 2 1/2/2024 at PM    allopurinol (ZYLOPRIM) 300 MG tablet TAKE 1 TABLET BY MOUTH DAILY 100 tablet 3 1/2/2024 at AM    aspirin (ASA) 81 MG chewable tablet Take 1 tablet (81 mg) by mouth daily 100 tablet 1 1/2/2024 at AM    buPROPion (WELLBUTRIN) 75 MG tablet TAKE 1 TABLET(75 MG) BY MOUTH TWICE DAILY 180 tablet 3 1/2/2024 at PM    cyanocobalamin (VITAMIN B-12) 1000 MCG tablet Take 1,000 mcg by mouth daily   Past Week    diphenhydrAMINE (BENADRYL) 25 MG tablet Take 25 mg by mouth every 6 hours as needed for itching   Past Week    gabapentin (NEURONTIN) 300 MG capsule TAKE ONE CAPSULE BY MOUTH TWICE A  capsule 3 1/2/2024 at PM    ipratropium - albuterol 0.5 mg/2.5 mg/3 mL (DUONEB) 0.5-2.5 (3) MG/3ML neb solution INHALE CONTENTS OF 1 VIAL USING NEBULIZER FOUR TIMES DAILY AS DIRECTED for 90 (Patient taking differently: Take 1 vial by nebulization every 4 hours as needed for shortness of breath, wheezing or cough) 90 mL 0 Past Month    Melatonin 10 MG CAPS Take 1 capsule by mouth At Bedtime   1/2/2024 at PM    montelukast (SINGULAIR) 10 MG tablet TAKE 1 TABLET BY MOUTH AT  BEDTIME 100 tablet 3 1/2/2024 at PM    mycophenolate (GENERIC EQUIVALENT) 250 MG capsule TAKE one CAPSULE BY MOUTH TWICE A  capsule 3 1/2/2024 at PM    rosuvastatin (CRESTOR) 10 MG tablet TAKE ONE TABLET BY MOUTH EVERY DAY 90 tablet 3 1/2/2024 at AM    tacrolimus (GENERIC) 0.5 MG capsule TAKE 1 CAPSULE BY MOUTH WITH 3  CAPSULES OF TACROLIMUS 1MG IN  THE MORNING FOR A TOTAL OF 3.5MG IN THE MORNING (Patient taking differently: TAKE 1 CAPSULE BY MOUTH WITH 3  CAPSULES OF TACROLIMUS 1MG IN  THE EVENING FOR A TOTAL OF 3.5MG IN THE EVENING) 50 capsule 6 1/2/2024 at PM    tacrolimus (GENERIC) 1 MG capsule TAKE 3 CAPSULES BY MOUTH WITH 1  TACROLIMUS 0.5MG IN THE MORNING  AND 3 CAPSULES IN THE EVENING  FOR TOTAL 3.5MG IN THE MORNING,  3MG IN THE EVENING (Patient taking differently: TAKE 3 CAPSULES BY MOUTH  IN THE MORNING   AND 3 CAPSULES IN THE EVENING  WITH 1  TACROLIMUS 0.5 MG FOR TOTAL 3 MG IN THE MORNING,  3.5 MG IN THE EVENING) 180 capsule 3 1/2/2024 at PM    traMADol (ULTRAM) 50 MG tablet TAKE ONE TABLET BY MOUTH EVERY MORNING AND AFTERNOON AND 2 AT BEDTIME (Patient taking differently: TAKE ONE TABLET BY MOUTH EVERY MORNING AND AFTERNOON AND 2 AT BEDTIME AS NEEDED FOR PAIN) 120 tablet 0 Past Week       Social History   I have reviewed this patient's social history and updated it with pertinent information if needed. Jim Willingham  reports that he quit smoking about 29 years ago. His smoking use included cigarettes. He has never been exposed to tobacco smoke. He has never used smokeless tobacco. He reports that he does not drink alcohol and does not use drugs.    Family History   I have reviewed this patient's family history and updated it with pertinent information if needed.   Family History   Problem Relation Age of Onset    Cerebrovascular Disease Mother 64    Diabetes Mother     Hypertension Mother     Coronary Artery Disease Father     Diabetes Type 2  Father     Obesity Brother     Obesity Brother     Cerebrovascular Disease Daughter 40       Review of Systems   Review of systems not obtained due to patient factors - breathing status    Physical Exam   Temp: 98.6  F (37  C) Temp src: Oral BP: 138/85 Pulse: 104   Resp: 25 SpO2: 99 % O2 Device: None (Room air)      Vital Signs with Ranges  Temp:  [98.2  F (36.8  C)-98.9  F (37.2  C)] 98.6  F (37  C)  Pulse:  [101-115] 104  Resp:  [17-25] 25  BP: (102-149)/() 138/85  SpO2:  [93 %-100 %] 99 %  171 lbs 11.81 oz    General appearance - Lying in bed; appears in no acute distress.  Head: Normocephalic and atraumatic.   Eyes: Pupils are equal, round, and reactive to light. Extraocular movement intact. No conjunctival pallor. No scleral icterus.  Neck: No JVD, bruit.  Cardiovascular: Regular rhythm. S1 and S2 auscultated. No murmurs, rub, or gallops.  Pulmonary/Chest: Normal  resp effort on RA, speaking in full sentences. Clear breath sounds to auscultation bilaterally. No wheezes, crackles, or rhonchi. No chest tenderness.   Abdominal: Bowel sounds present. Soft. No distension. No rigidity, rebound or guarding. No organomegaly, hernias or palpable masses on deep palpation. No CVA tenderness.  Extremities: Warm, no cyanosis, 2+ distal pulses bilaterally. No edema.   Skin: Skin is warm and not diaphoretic. No rash, bruising, or erythema.  Neuro: Alert and oriented to person, place, and time. No focal neurological deficit.    Data   Data reviewed today:  I personally reviewed: recent data    LAB reviewed   IMAGES Reviewed    No results found for this or any previous visit (from the past 24 hour(s)).

## 2024-01-08 NOTE — PLAN OF CARE
Goal Outcome Evaluation:      Plan of Care Reviewed With: patient    Overall Patient Progress: improvingOverall Patient Progress: improving    Outcome Evaluation: Bowel movement x2. Diet advanced to clear liquids    ICU End of Shift Summary. See flowsheets for vital signs and detailed assessment.    Changes this shift: Diet advanced to clear liquids. Some cramping with first oral intake, improved with bowel movement. Two large, urgent bowel movements. Blood pressure stable. One dose of hydralazine, 10 mg, given this evening. Oxygen turned off with stable saturation. Up for two walks with stable blood pressure and oxygenation. Pain managed with scheduled tylenol and robaxin.     Plan: Transfer to general surgery floor when bed available.

## 2024-01-08 NOTE — DISCHARGE SUMMARY
"[facility]   MIS Discharge Summary    Date of Admission: 1/3/2024  Date of Discharge: 1/9/2024    Admission Diagnosis:  1. NICM s/p heart transplant 2021 on immunosuppression   2. History of RNY GB   3. Small bowel obstruction   4. Aspiration pneumonia   5. CKD  6. COPD  7. HTN  8. DM2  9. HLD     Discharge Diagnosis:  1. Same as above    Consultations:  SICU   Cardiology     Procedures:  Laparoscopy converted to laparotomy and lysis of adhesions with Dr. Montgomery 1/3/2023.     Brief HPI: From the operative report   Jim Willingham underwent prior LRYGB surgery in 2003 by Dr Garg.  He has symptoms of worsening SBO, and was offered surgery.     Hospital Course:  The patient was admitted and underwent the above procedure. The operation was complicated by a large aspiration event. The patient was admitted to the SICU post operatively for close respiratory monitoring. He was prescribed IV zosyn post operatively. On POD6 respiratory cultures grew  Hafnea alvei which was resistant to Zosyn. The antibiotics were switched to IV cefepime while inpatient. He will be discharged on PO levoquin and a prednisone burst. The patient's diet was slowly advanced as bowel function returned. Pain was controlled with oral pain medication and the patient was able to ambulate and void without difficulty. Cardiology was consulted for management of immunosuppression and hypertension. Please see their daily notes for details. The patient received appropriate education post operatively. On POD #6 the patient was discharged to home. Staples to be removed at follow up appointment.     His preliminary tacrolimus level from lab this morning is 7.6. This was discussed with cardiology. He will be discharged on his PTA dose of tacrolimus and follow up with their team.     Discharge Physical Exam:  BP (!) 152/95 (BP Location: Right arm)   Pulse 107   Temp 99.2  F (37.3  C) (Oral)   Resp 21   Ht 1.727 m (5' 8\")   Wt " 80.6 kg (177 lb 11.1 oz)   SpO2 96%   BMI 27.02 kg/m      GEN: Comfortable, no distress   CV: Non cyanotic, tachycardic   RESP: No distress, on room air   ABD: soft, minimally tender, without guarding or rebound tenderness, incisions c/d/I with staples in the midline wound     Meds:       Review of your medicines        START taking        Dose / Directions   hydrALAZINE 25 MG tablet  Commonly known as: APRESOLINE  Used for: Secondary hypertension      Dose: 25 mg  Take 1 tablet (25 mg) by mouth 4 times daily for 30 days  Quantity: 120 tablet  Refills: 0     levofloxacin 750 MG tablet  Commonly known as: LEVAQUIN  Used for: Chronic obstructive pulmonary disease, unspecified COPD type (H)      Dose: 750 mg  Take 1 tablet (750 mg) by mouth daily  Quantity: 6 tablet  Refills: 0     methocarbamol 500 MG tablet  Commonly known as: ROBAXIN      Dose: 500 mg  Take 1 tablet (500 mg) by mouth 4 times daily as needed for muscle spasms  Quantity: 40 tablet  Refills: 0     polyethylene glycol 17 GM/Dose powder  Commonly known as: MIRALAX      Dose: 17 g  Take 17 g by mouth daily  Quantity: 510 g  Refills: 0     predniSONE 20 MG tablet  Commonly known as: DELTASONE  Used for: Chronic obstructive pulmonary disease, unspecified COPD type (H)      Dose: 40 mg  Take 2 tablets (40 mg) by mouth daily for 5 days  Quantity: 10 tablet  Refills: 0     sennosides 8.6 MG tablet  Commonly known as: SENOKOT      Dose: 1 tablet  Take 1 tablet by mouth daily as needed for constipation  Quantity: 30 tablet  Refills: 0            CONTINUE these medicines which may have CHANGED, or have new prescriptions. If we are uncertain of the size of tablets/capsules you have at home, strength may be listed as something that might have changed.        Dose / Directions   acetaminophen 325 MG tablet  Commonly known as: TYLENOL  This may have changed:   when to take this  reasons to take this      Dose: 975 mg  Take 3 tablets (975 mg) by mouth every 8 hours  as needed for mild pain  Quantity: 100 tablet  Refills: 0     ipratropium - albuterol 0.5 mg/2.5 mg/3 mL 0.5-2.5 (3) MG/3ML neb solution  Commonly known as: DUONEB  This may have changed:   how much to take  how to take this  when to take this  reasons to take this  additional instructions  Used for: Acute cough, Acute bronchitis, unspecified organism      INHALE CONTENTS OF 1 VIAL USING NEBULIZER FOUR TIMES DAILY AS DIRECTED for 90  Quantity: 90 mL  Refills: 0     * tacrolimus 1 MG capsule  Commonly known as: GENERIC  This may have changed: additional instructions  Used for: S/P orthotopic heart transplant (H)      TAKE 3 CAPSULES BY MOUTH WITH 1  TACROLIMUS 0.5MG IN THE MORNING  AND 3 CAPSULES IN THE EVENING  FOR TOTAL 3.5MG IN THE MORNING,  3MG IN THE EVENING  Quantity: 180 capsule  Refills: 3     * tacrolimus 0.5 MG capsule  Commonly known as: GENERIC  This may have changed: additional instructions  Used for: S/P orthotopic heart transplant (H)      TAKE 1 CAPSULE BY MOUTH WITH 3  CAPSULES OF TACROLIMUS 1MG IN  THE MORNING FOR A TOTAL OF 3.5MG IN THE MORNING  Quantity: 50 capsule  Refills: 6     * traMADol 50 MG tablet  Commonly known as: ULTRAM  This may have changed: additional instructions  Used for: S/P orthotopic heart transplant (H)      TAKE ONE TABLET BY MOUTH EVERY MORNING AND AFTERNOON AND 2 AT BEDTIME  Quantity: 120 tablet  Refills: 0     * traMADol 50 MG tablet  Commonly known as: ULTRAM  This may have changed: You were already taking a medication with the same name, and this prescription was added. Make sure you understand how and when to take each.      Dose: 50 mg  Take 1 tablet (50 mg) by mouth every 6 hours as needed for moderate pain  Quantity: 12 tablet  Refills: 0           * This list has 4 medication(s) that are the same as other medications prescribed for you. Read the directions carefully, and ask your doctor or other care provider to review them with you.                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, unspecified laterality      Dose: 1 tablet  TAKE 1 TABLET BY MOUTH DAILY  Quantity: 100 tablet  Refills: 3     aspirin 81 MG chewable tablet  Commonly known as: ASA  Used for: S/P orthotopic heart transplant (H)      Dose: 81 mg  Take 1 tablet (81 mg) by mouth daily  Quantity: 100 tablet  Refills: 1     Benadryl 25 MG tablet  Generic drug: diphenhydrAMINE      Dose: 25 mg  Take 25 mg by mouth every 6 hours as needed for itching  Refills: 0     buPROPion 75 MG tablet  Commonly known as: WELLBUTRIN  Used for: S/P orthotopic heart transplant (H)      TAKE 1 TABLET(75 MG) BY MOUTH TWICE DAILY  Quantity: 180 tablet  Refills: 3     cyanocobalamin 1000 MCG tablet  Commonly known as: VITAMIN B-12      Dose: 1,000 mcg  Take 1,000 mcg by mouth daily  Refills: 0     gabapentin 300 MG capsule  Commonly known as: NEURONTIN  Used for: S/P orthotopic heart transplant (H)      Dose: 300 mg  TAKE ONE CAPSULE BY MOUTH TWICE A DAY  Quantity: 180 capsule  Refills: 3     Melatonin 10 MG Caps      Dose: 1 capsule  Take 1 capsule by mouth At Bedtime  Refills: 0     montelukast 10 MG tablet  Commonly known as: SINGULAIR  Used for: S/P orthotopic heart transplant (H)      TAKE 1 TABLET BY MOUTH AT  BEDTIME  Quantity: 100 tablet  Refills: 3     mycophenolate 250 MG capsule  Commonly known as: GENERIC EQUIVALENT  Used for: S/P orthotopic heart transplant (H)      TAKE one CAPSULE BY MOUTH TWICE A DAY  Quantity: 180 capsule  Refills:  3     rosuvastatin 10 MG tablet  Commonly known as: CRESTOR  Used for: Hyperlipidemia LDL goal <70      Dose: 10 mg  TAKE ONE TABLET BY MOUTH EVERY DAY  Quantity: 90 tablet  Refills: 3               Where to get your medicines        These medications were sent to [facility] - [place], [place] - [address]  [address] [address]      Phone: [phone]   acetaminophen 325 MG tablet  hydrALAZINE 25 MG tablet  levofloxacin 750 MG tablet  methocarbamol 500 MG tablet  polyethylene glycol 17 GM/Dose powder  predniSONE 20 MG tablet  sennosides 8.6 MG tablet       Some of these will need a paper prescription and others can be bought over the counter. Ask your nurse if you have questions.    Bring a paper prescription for each of these medications  traMADol 50 MG tablet         Additional instructions:  After Care       Future Labs/Procedures    Activity     Comments:    Your activity upon discharge: activity as tolerated    Diet     Comments:    Follow this diet upon discharge: Regular diet    Discharge Instructions     Comments:    Discharge Instructions  [facility]    Regular diet     How often should I do my deep breathing and coughing?  Use your incentive spirometer (small plastic breathing device) every hour while awake after you get home. Using the incentive spirometer helps you deep breath. Continuing to cough and deep breath will help prevent fevers and pneumonia.   If you do not have a fever after one week, you can stop using the incentive spirometer and discard it.     You can continue to take deep breaths without the incentive spirometer every one to two hours while awake for the month after surgery.    What kinds of activity can I do?  Get plenty of rest the first few days after surgery and try to balance rest and activity. You will need some time to recover - you may be more tired than you realize at first. You'll feel better and heal faster if you take  good care of yourself.  For 4 weeks after surgery (Please review restrictions at your one week visit, they could change based on how well you are doing):  -Don't lift more than 20 pounds.   -Take 4-5 short walks every day.  -Don't jog, run, or do belly exercises.  Don't swim, bathe or use a hot tub until your cuts are healed (scabs are gone).  You may shower  Don't plan to fly or take a road trip within the first 1 to 3 months after surgery.  You could get a blood clot in your legs. If you must travel, get up and move around every hour for at least 5 minutes before continuing on your journey.  Your care team can help you decide when it's safe for you to travel.     What can I do for pain control?  You had major belly surgery that involves all layers of your belly muscles. Pain is expected, even for some as far out as 6-8 weeks after surgery. Moving, sneezing, coughing, and breathing will cause discomfort because these activities use your belly muscles.   Please see your after visit summary medication review for what pain medication will be continued, discontinued and newly started for you.    You can take opioid pain medicine if prescribed and if needed. Try to wean off from it as soon as you feel comfortable.   Do not drive while you are taking opioid pain medicine. This is dangerous.  You can take acetaminophen (Tylenol) between your prescribed pain medicine on a scheduled basis OR take it scheduled every 6 hours (check with your care team for specifics).  -You may also take Tylenol for pain in place of the opioid pain medicine (check with your care team for specifics).   You can apply ice or heat to the affected area(s). Just remember to wrap the ice in something and limit icing sessions to 20 minutes. Excessive icing can irritate the skin or cause skin damage.  You can apply heat with a hot, wet towel or heating pad. Just like cold therapy, limit heat application to 20 minutes. Never sleep with a heating pad on.  It could cause severe burns to your skin.  Wear your binder to support your belly muscles if you have one.  Take this off a little more each day and try to be off completely by 2 weeks after surgery. If you don't need your binder for comfort or support, you don't need to wear it.   You may not be able to sleep in a comfortable position for a few weeks after surgery. This is normal. You may be more comfortable sleeping in a recliner or propped up with 3 pillows for the first couple of weeks after surgery.      Please call the clinic for any of the following pain concerns, we would like to talk to you:  -pain that does not improve with rest  -pain that gets worse and worse  -pain that is not controlled by your pain medicine  -a sudden severe increase in pain    What should I know about my incisions (cuts)?  Your incisions are closed with absorbable suture and dressed with skin glue.   You may shower in the hospital after surgery and can get your incision coverings wet.  Do not submerge in water (e.g. No baths, swimming pools, hot tubs) until your care team tells you it is okay and your incisions are completely healed.    Call the clinic if you have any of these signs or symptoms of infection:  -Redness around the site.  -Drainage that smells bad.  -Drainage that is thick yellow or green.  -An increase in pain around the incision site  -An increase in swelling around the incision site  -Heat or warmth around incision site   -Fever of 101.5  F (38.3  C) or higher when taken under the tongue.    -Chills    Will my urine or bowel movements change?   Your first bowel movements (stools) will likely be liquid. You may also notice old blood or a darker color (black or maroon color) in your bowel movements.  This is not unusual and usually goes away after the first week, if not sooner. You may not have a bowel movement for a week.   If you have not had a bowel movement for at least three days after your surgery date and are  "passing gas, you can use over the counter stool softeners.  Please stop taking the stool softeners and laxatives if your stools are loose.  Increasing fluids and activity as well as getting off narcotics will help prevent constipation.    Call the clinic if:   -You have stomach pain.   -You continue to have constipation.  -You have excessive bloating after walking and passing gas.    How can I prevent dehydration if I feel nauseated (sick to my stomach) and vomit (throw up)?   Vomiting is not normal after surgery. If you continue to have nausea and vomiting, call the clinic.   Nausea can be a sign of dehydration. That is why it is very important to track your fluids.  Do not nap more than one hour during the day. Set a timer to wake yourself up, if needed. Too much sleep will keep you from drinking enough fluid during the day and lead to dehydration.  No outside activity in hot, humid weather until you can drink 48 to 64 ounces of fluid in 24 hours. If you sweat a lot, your body may lose too much water.  Try to take a 1 ounce sip of water (one medicine cup) every 15 minutes.  Set a timer to remind yourself.    Call the clinic if you have any of these signs or symptoms of dehydration:  -Dark colored urine  -Urinating (pass water) less than 2-3 times per day  -Lack of energy  -Nausea  -Dizziness  -Headache    Call the clinic ANY TIME at [phone] if:  -Your pain medicine is not working.  -You have a fever = 101.5 F.  -You have belly or left shoulder pain that gets worse and worse.  -You have a swollen leg with redness, warmth, or pain behind the knee or calf.  -You have chest pain   -You feel very short of breath.  -You have a sudden severe increase in heart rate.  -You have vomiting that gets worse and worse.  -You have constant nausea (feeling sick to your stomach) that does not go away with medication.  -You have trouble swallowing.  -You have an increasing feeling that \"something is not right\".  -You have " hiccups that do not stop.  -You have any questions or concerns.    AFTER HOURS QUESTIONS OR CONCERNS: Call [phone] and ask to speak with surgery resident if you are having troubles in the evenings, at night, or on weekends. Please call if you experience increasing abdominal pain, nausea, vomiting, increasing drainage from your wounds, chills, or fever >101.5    If you have to go to the Emergency Room, we prefer you go to the hospital that did your surgery. Please let them know that you had bariatric surgery and to notify your surgeon.    When should I go back to the clinic?  Follow up with your care team in 1-2 weeks.   If this appointment was not already made, please call: [phone]    Appointments located at [facility] clinic:  [facility] ([facility])    [address]  [place] [address]                Follow Up:  -Follow up with in bariatric surgery clinic 1-2 week(s) after discharge.       BARIATRIC PATIENTS:  Call [phone] to schedule or to reach your care team    GENERAL SURGERY PATIENTS: Call [phone] for scheduling needs or to reach your care team

## 2024-01-08 NOTE — PROGRESS NOTES
Surgery Note  01/08/24     Feeling well this morning. Up in the chair. Off of oxygen. Voiding, walking. Multiple loose BMs. Tolerating clears.     Vitals reviewed, Afebrile, -110, and /85    GEN: Comfortable, no distress   CV: Non cyanotic, tachycardic   RESP: No distress, on room air   ABD: soft, minimally tender, without guarding or rebound tenderness, incisions c/d/I with staples in the midline wound     Labs reviewed   Tacrolimus level pending   WBC 11.8 (17.7)   Hgb 11.5  Plt 164  Na 135  K 3.0  Cr 1.47  Respiratory and Blood culture 1/6/2023 NGTD     No imaging     AP: 68 year old M with a history of RNY-GB in 2003 presenting with SBO now status post laparoscopy converted to laparotomy and lysis of adhesions with Dr. Montgomery 1/3/2023. Operation complicated by large aspiration event.     PMH: Heart Transplant (2021), CKD, HTN, COPD, DM2, HLD, Afib     Okay for transfer to floor  Cards consulted, appreciate recs (PO hydralazine, transition from tacrolimus drip to PO)   Regular diet   Bowel regimen   Zosyn x5 days for aspiration (end 1/9/2023)   PT/OT  Ambulate   PPx heparin   Dispo: Likely discharge tomorrow. PT recommendation home with assist.      Heavenly Barahona   Surgery Resident   [id number]

## 2024-01-09 NOTE — PLAN OF CARE
Occupational Therapy Discharge Summary    Reason for therapy discharge:    Discharged to home.    Progress towards therapy goal(s). See goals on Care Plan in [facility] electronic health record for goal details.  Goals partially met.  Barriers to achieving goals:   discharge from facility.    Therapy recommendation(s):    No further therapy is recommended.

## 2024-01-09 NOTE — PLAN OF CARE
ICU End of Shift Summary. See flowsheets for vital signs and detailed assessment.    Changes this shift: Neuro intact, up ambulating around unit with minimal shortness of breath with exertion; hemodynamically stable; diet advanced to regular diet and tolerating well, stooling x3 today, adequate urine; pain controlled best with ultram    Plan: Continue to monitor, prepare for possible discharge tomorrow      Goal Outcome Evaluation:      Plan of Care Reviewed With: patient    Overall Patient Progress: improvingOverall Patient Progress: improving    Outcome Evaluation: Tolerating regular diet, voiding/stooling; tolerating room air with PRN inhaler

## 2024-01-09 NOTE — PLAN OF CARE
Major Shift Events:  VSS. A&Ox4. Independent in room. Okay to discharge after tacrolimus level is resulted.     Plan: Discharge home. Follow up appointments outpatient.     For vital signs and complete assessments, please see documentation flowsheets.          Discharged to: Home at 1330 via spouse.     Belongings: Sent home with patient. Meds in discharge pharmacy.     AVS (After Visit Summary) discussed with: patient

## 2024-01-11 NOTE — TELEPHONE ENCOUNTER
Current Outpatient Medications   Medication    acetaminophen (TYLENOL) 325 MG tablet    albuterol (PROAIR HFA/PROVENTIL HFA/VENTOLIN HFA) 108 (90 Base) MCG/ACT inhaler    allopurinol (ZYLOPRIM) 300 MG tablet    aspirin (ASA) 81 MG chewable tablet    buPROPion (WELLBUTRIN) 75 MG tablet    cyanocobalamin (VITAMIN B-12) 1000 MCG tablet    diphenhydrAMINE (BENADRYL) 25 MG tablet    gabapentin (NEURONTIN) 300 MG capsule    hydrALAZINE (APRESOLINE) 25 MG tablet    ipratropium - albuterol 0.5 mg/2.5 mg/3 mL (DUONEB) 0.5-2.5 (3) MG/3ML neb solution    levofloxacin (LEVAQUIN) 750 MG tablet    Melatonin 10 MG CAPS    methocarbamol (ROBAXIN) 500 MG tablet    montelukast (SINGULAIR) 10 MG tablet    mycophenolate (GENERIC EQUIVALENT) 250 MG capsule    polyethylene glycol (MIRALAX) 17 GM/Dose powder    predniSONE (DELTASONE) 20 MG tablet    rosuvastatin (CRESTOR) 10 MG tablet    sennosides (SENOKOT) 8.6 MG tablet    tacrolimus (GENERIC) 0.5 MG capsule    tacrolimus (GENERIC) 1 MG capsule    traMADol (ULTRAM) 50 MG tablet    traMADol (ULTRAM) 50 MG tablet     No current facility-administered medications for this visit.       Patient currently taking the above medications as prescribed unless listed below.    Not taking:  cyanocobalamin (VITAMIN B-12) 1000 MCG tablet   polyethylene glycol (MIRALAX) 17 GM/Dose powder   traMADol (ULTRAM) 50 MG tablet       PROVIDERS:   Click create new note and enter <.>MEDRECACO.  Review medication documentation from RN.  Click blue hyperlink and select appropriate option and refresh the note.  If changes recommended, route back to RN team with noted to update patient.  If no changes, sign/close encounter.      REGINALD Mccabe  [facility] Primary Care Triage

## 2024-01-12 NOTE — PROGRESS NOTES
Pt called to check in. He's doing well. Eating, drinking, using the bathroom, and walking. No fevers. No pain.     Upcoming appointments discussed. Labs will be done locally ahead of time. Echo canceled as he just had one inpatient.     Unable to do a allomap due to timing of labs.

## 2024-01-17 NOTE — TELEPHONE ENCOUNTER
[facility] Call Center    Phone Message    May a detailed message be left on voicemail: yes     Reason for Call: Other: Patient has stapes from DOS: 01/04/2023 - Small bowel obstruction, and wondering when post post op should be. Please call patient to advise.       Action Taken: Message routed to:  [facility] ([facility]): General Surgery    Travel Screening: Not Applicable

## 2024-01-17 NOTE — TELEPHONE ENCOUNTER
Pt unable to have allomap drawn because he had labs at an outside [place] lab. Appt tomorrow and will discuss with the provider.

## 2024-01-18 NOTE — PATIENT INSTRUCTIONS
Patient Instructions  1. Please schedule a colonoscopy  2. No changes with your tacrolimus dosing. Continue 3.5 in am and 3 mg in pm. Repeat in 1 week.   3. If your home bp's are consistently >140/90 please let me know.   4. We will repeat immuknow, allomap, and dsas at your 3rd annual.   5. Ok to take prednisone 20 mg daily x 4 days.     Next transplant clinic appointment:  3rd annual in May/June.   Next lab draw: repeat labs in 1 week.   Coordinator will call with all pending results.     Please call transplant coordinator with any questions:    Yolette Rueda RN BSN   Post Heart Transplant Nurse Coordinator  [facility]  Questions: [phone]

## 2024-01-18 NOTE — PROGRESS NOTES
ADULT HEART TRANSPLANT CLINIC      January 18, 2024        Mr. Jim Willingham is a 68yr old male with a history of NICM (s/p HM2 LVAD 6/2017) s/p OHT 5/6/21,     Since her last visit he reports feeling the best he has felt in years.  He feels like he is in his early 40s.  Went to [place] to [place] with his granddaughter this past year.     He denies PND orthopnea or other heart failure symptoms.    Tolerating his immunosuppression. No diarrhea.     Did have influenza this fall, full recovery.     Transplant-related complications initially:   c/b right pleural air leak (required prolonged chest tube),CAP (RLL, 6/2021), recurrent pleural effusions (s/p thoracenteses x2 6/2021 and 7/2021, exudative, repeatedly cultured negative), RLL cavitary lesions (per chest CT 7/14/21, BD glucan indeterminate, aspergillus negative, not started on antifungals), pericardial effusion (1.4cm per TTE 7/12/21, conservatively managed), low-grade EBV viremia, recurrent/persistent gout flare, transaminase elevation with questionable choledocholithiasis (conservatively managed with resolution), COVID-19 (8/2021, s/p monoclonal antibodies and remdesivir x5 days), and 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    This visit:     Just had SBO and required surgery   Did have small aspiration with this   Received pred and levofloxacin and getting better     On his way to a full recovery and staples just came out   Bowel movements are normal   Overall has been feeling very well before this     Rejection history:  none  DSAs:  none  Graft function: Normal  Opportunistic infections: EBV viremia which was been low-grade as well as cavitary lung lesions positive Aspergillus status post treatment    Serostatus:  CMV D+/R+  EBV D+/R+  Toxo D-/R-      PAST MEDICAL HISTORY:    Past Medical History:   Diagnosis Date    Bariatric surgery status 2003    Benign essential hypertension 05/11/2017    Bilateral carpal tunnel  syndrome 11/02/2020    Cardiomyopathy, unspecified (H) 05/08/2017    CKD (chronic kidney disease) stage 3, GFR 30-59 ml/min (H) 05/11/2017    COPD (chronic obstructive pulmonary disease) (H) 11/02/2020    Depression 05/11/2017    Diabetes mellitus (H) 1995    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 7.66 cm, Restrictive pattern , Severe mitral valve regurgitation    CECILIA (obstructive sleep apnea) 05/11/2017    Paroxysmal atrial fibrillation (H) 05/11/2017    Paroxysmal VT (H) 05/11/2017    Peripheral polyneuropathy 03/28/2023    Pulmonary cavitary lesion 11/18/2021    Rotator cuff tear arthropathy of both shoulders 11/02/2020    Type 2 diabetes mellitus with diabetic polyneuropathy (H) 03/18/2022    Uncomplicated asthma     Vitamin B12 deficiency (non anemic) 05/11/2017       CURRENT MEDICATIONS:  Current Outpatient Medications   Medication Sig Dispense Refill    acetaminophen (TYLENOL) 325 MG tablet Take 3 tablets (975 mg) by mouth every 8 hours as needed for mild pain 100 tablet 0    albuterol (PROAIR HFA/PROVENTIL HFA/VENTOLIN HFA) 108 (90 Base) MCG/ACT inhaler Inhale 2 puffs into the lungs every 4 hours as needed for shortness of breath, wheezing or cough 40.2 g 2    allopurinol (ZYLOPRIM) 300 MG tablet TAKE 1 TABLET BY MOUTH DAILY 100 tablet 3    aspirin (ASA) 81 MG chewable tablet Take 1 tablet (81 mg) by mouth daily 100 tablet 1    buPROPion (WELLBUTRIN) 75 MG  tablet TAKE 1 TABLET(75 MG) BY MOUTH TWICE DAILY 180 tablet 3    cyanocobalamin (VITAMIN B-12) 1000 MCG tablet Take 1,000 mcg by mouth daily      diphenhydrAMINE (BENADRYL) 25 MG tablet Take 25 mg by mouth every 6 hours as needed for itching      gabapentin (NEURONTIN) 300 MG capsule TAKE ONE CAPSULE BY MOUTH TWICE A  capsule 3    hydrALAZINE (APRESOLINE) 25 MG tablet Take 1 tablet (25 mg) by mouth 4 times daily for 30 days 120 tablet 0    ipratropium - albuterol 0.5 mg/2.5 mg/3 mL (DUONEB) 0.5-2.5 (3) MG/3ML neb solution INHALE CONTENTS OF 1 VIAL USING NEBULIZER FOUR TIMES DAILY AS DIRECTED for 90 (Patient taking differently: Take 1 vial by nebulization every 4 hours as needed for shortness of breath, wheezing or cough) 90 mL 0    levofloxacin (LEVAQUIN) 750 MG tablet Take 1 tablet (750 mg) by mouth daily 6 tablet 0    Melatonin 10 MG CAPS Take 1 capsule by mouth At Bedtime      methocarbamol (ROBAXIN) 500 MG tablet Take 1 tablet (500 mg) by mouth 4 times daily as needed for muscle spasms 40 tablet 0    montelukast (SINGULAIR) 10 MG tablet TAKE 1 TABLET BY MOUTH AT  BEDTIME 100 tablet 3    mycophenolate (GENERIC EQUIVALENT) 250 MG capsule TAKE one CAPSULE BY MOUTH TWICE A  capsule 3    polyethylene glycol (MIRALAX) 17 GM/Dose powder Take 17 g by mouth daily 510 g 0    rosuvastatin (CRESTOR) 10 MG tablet TAKE ONE TABLET BY MOUTH EVERY DAY 90 tablet 3    sennosides (SENOKOT) 8.6 MG tablet Take 1 tablet by mouth daily as needed for constipation 30 tablet 0    tacrolimus (GENERIC) 0.5 MG capsule TAKE 1 CAPSULE BY MOUTH WITH 3  CAPSULES OF TACROLIMUS 1MG IN  THE MORNING FOR A TOTAL OF 3.5MG IN THE MORNING (Patient taking differently: TAKE 1 CAPSULE BY MOUTH WITH 3  CAPSULES OF TACROLIMUS 1MG IN  THE EVENING FOR A TOTAL OF 3.5MG IN THE EVENING) 50 capsule 6    tacrolimus (GENERIC) 1 MG capsule TAKE 3 CAPSULES BY MOUTH WITH 1  TACROLIMUS 0.5MG IN THE MORNING  AND 3 CAPSULES IN THE EVENING  FOR TOTAL 3.5MG IN  THE MORNING,  3MG IN THE EVENING (Patient taking differently: TAKE 3 CAPSULES BY MOUTH  IN THE MORNING  AND 3 CAPSULES IN THE EVENING  WITH 1  TACROLIMUS 0.5 MG FOR TOTAL 3 MG IN THE MORNING,  3.5 MG IN THE EVENING) 180 capsule 3    traMADol (ULTRAM) 50 MG tablet Take 1 tablet (50 mg) by mouth every 6 hours as needed for moderate pain 12 tablet 0    traMADol (ULTRAM) 50 MG tablet TAKE ONE TABLET BY MOUTH EVERY MORNING AND AFTERNOON AND 2 AT BEDTIME (Patient taking differently: TAKE ONE TABLET BY MOUTH EVERY MORNING AND AFTERNOON AND 2 AT BEDTIME AS NEEDED FOR PAIN) 120 tablet 0       ROS:   Constitutional: No fever, chills, or sweats. Weight stable   ENT: No visual disturbance, ear ache, epistaxis, sore throat.   Allergies/Immunologic: Negative.   Respiratory: some chronic cough, no hemoptysis.   Cardiovascular: As per HPI.   GI: No nausea, vomiting, hematemesis, melena, or hematochezia. Just had bowel surgery    : No urinary frequency, dysuria, or hematuria.   Integument: Negative.   Psychiatric: Negative.   Neuro: Negative  Endocrinology: neg   Musculoskeletal: gout controlled       Exam:  BP (!) 152/88 (BP Location: Right arm, Patient Position: Chair, Cuff Size: Adult Regular)   Pulse 110   Wt 91.5 kg (201 lb 11.2 oz)   SpO2 97%   BMI 30.67 kg/m      General: the patient is a pleasant male in no apparent distress.    HEENT: NC/AT. PERRLA. EOMI.  Sclerae white, not injected.    Neck:  No adenopathy, No thyromegaly.    Cardiac: No jugular venous distention when sitting upright.  RRR.  Normal S1 S2 splits physiologically.  No murmur, rub click, or gallop.     Abdomen: soft, nontender, nondistended.  No organomegaly.  Extremities:  No clubbing, cyanosis, or LE edema.    Neuro: Alert & Oriented x 3, grossly non focal.  Integument: no open lesions, rashes, or jaundice.      DSA negative  allomap 35      Last Immuknow: 348     Cr 1.4 (improved)   Na 137   K 5.4     WBC 7.7   Hgb: 11.6   Plt: 212      Assessment  and Plan:  Mr. Jim Wlilingham is a 68 yr old male with a history of NICM (s/p HM2 LVAD 6/2017) s/p OHT 5/6/21     Initial post transplant course was complex as detailed above but he is doing very well now.      graft function is normal, he has no history of rejection, immunknow with a goal range he has no donor specific antibodies.       Immunosuppression:  - MMF 500mg twice daily  - tacro, goal level 4-6.       PPx:  - CAV:  Aspirin 81mg daily and rosuvastatin 10mg daily.  - GI: Pantoprazole 40mg daily  - Osteoporosis: Calcium/vitamin D supplements     Rejection history:  none  AlloMap scores:  < 35 recently  DSAs:  none      Serostatus:  - CMV D+/R+  - EBV D+/R+  - Toxo D-/R-     KALI cavitary lesion  +Aspergillus (9/2021)    S/p 2 months of voriconazole- resolved .     EBV viremia- low  level   - stable He remains asymptomatic.      Recurrent CMV - getting frequency surveillance and running MMF low      CKD-stable to improved     Recent SBO: recovering     Recent aspiration with wheezing- 20 mg pred given x 5 days     Health care maintenance: per coordinator note     RTC for 2 nd annual         CC  Patient Care Team:  Ricardo Gómez MD as PCP - General (Internal Medicine)  Elfego Hayden MD as MD (Internal Medicine)  Delisa Montgomery MD as Referring Physician (Cardiology)  Chase Qureshi MD as MD (Internal Medicine-Hematology & Oncology)  Kadie Pedro LICSW as  ( - Clinical)  Yolette Rueda, RN as Transplant Coordinator  Akshat Parkinson [facility] as Pharmacist (Pharmacist)  Marcos Washington MD as MD (Infectious Diseases)  Lyle Sarmiento DO as MD (Nephrology)  Ricardo Gómez MD as Assigned PCP  Corey Melendrez MD as MD (Dermatology)  Delisa Montgomery MD as Referring Physician (Cardiovascular Disease)  Nba Aguirre MD as MD (Neurology)  Nba Aguirre MD as Assigned Neuroscience Provider  Shelia Means NP as Nurse Practitioner  (Cardiovascular Disease)  Tommy Roe MD as Assigned Pain Medication Provider  Delisa Montgomery MD as Assigned Heart and Vascular Provider  TOMMY ROE

## 2024-01-18 NOTE — NURSING NOTE
Chief Complaint   Patient presents with    Follow Up     present for consult for LVAD pt with new a-fib     Vitals were taken, medications reconciled, and EKG was performed.    Nedra Batista EMT  4:41 PM

## 2024-01-18 NOTE — NURSING NOTE
Transplant Coordinator Note    Reason for visit: 30 month follow up and follow up after recent admission for SBO   Coordinator: Present        Health concerns addressed today:  1. Unable to draw allomap  2. Aspergillus in past and that is why MMF is lowered.   3. Watch Bp's and if high we will restart losartan.       Immunosuppressants:   mg bid  Tacro goal 4-6. Recent level 3.9. no changes.     Routine screenings:    Derm: ?  Dental: ?  Colonoscopy: 2016, every 5 years.   Prostate: last 0.36  Eye:   Flu/Pneumonia: UTD   Covid: Due   Shingrix:     Resulted labs reviewed with patient  Medication record reviewed and reconciled  Questions and concerns addressed  Pt verbalized an understanding of plan of care.     Patient Instructions  1. Please schedule a colonoscopy  2. No changes with your tacrolimus dosing. Continue 3.5 in am and 3 mg in pm. Repeat in 1 week.   3. If your home bp's are consistently >140/90 please let me know.   4. We will repeat immuknow, allomap, and dsas at your 3rd annual.   5. Ok to take prednisone 20 mg daily x 4 days.     Next transplant clinic appointment:  3rd annual in May/June.   Next lab draw: repeat labs in 1 week.   Coordinator will call with all pending results.     Please call transplant coordinator with any questions:    Yolette Rueda RN BSN   Post Heart Transplant Nurse Coordinator  [facility]  Questions: [phone]

## 2024-01-18 NOTE — PROGRESS NOTES
Patient here for suture removal.  Incision clean, dry and intact.  Incision healing well with remaining small amount of scab.  Steri strips applied.  Discussed constipation prevention tactics: hydration, fiber therapy, high fiber fruits/vegetables, and early medication attention should he start to notice the same symptoms.

## 2024-01-18 NOTE — LETTER
1/18/2024      RE: Jim Willingham  [address] [facility] [id number]       Dear Colleague,    Thank you for the opportunity to participate in the care of your patient, Jim Willingham, at the [facility] [place] [facility]. Please see a copy of my visit note below.    ADULT HEART TRANSPLANT CLINIC      January 18, 2024        Mr. Jim Willingham is a 68yr old male with a history of NICM (s/p HM2 LVAD 6/2017) s/p OHT 5/6/21,     Since her last visit he reports feeling the best he has felt in years.  He feels like he is in his early 40s.  Went to [place] to [place] with his granddaughter this past year.     He denies PND orthopnea or other heart failure symptoms.    Tolerating his immunosuppression. No diarrhea.     Did have influenza this fall, full recovery.     Transplant-related complications initially:   c/b right pleural air leak (required prolonged chest tube),CAP (RLL, 6/2021), recurrent pleural effusions (s/p thoracenteses x2 6/2021 and 7/2021, exudative, repeatedly cultured negative), RLL cavitary lesions (per chest CT 7/14/21, BD glucan indeterminate, aspergillus negative, not started on antifungals), pericardial effusion (1.4cm per TTE 7/12/21, conservatively managed), low-grade EBV viremia, recurrent/persistent gout flare, transaminase elevation with questionable choledocholithiasis (conservatively managed with resolution), COVID-19 (8/2021, s/p monoclonal antibodies and remdesivir x5 days), and 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    This visit:     Just had SBO and required surgery   Did have small aspiration with this   Received pred and levofloxacin and getting better     On his way to a full recovery and staples just came out   Bowel movements are normal   Overall has been feeling very well before this     Rejection history:  none  DSAs:  none  Graft function: Normal  Opportunistic  infections: EBV viremia which was been low-grade as well as cavitary lung lesions positive Aspergillus status post treatment    Serostatus:  CMV D+/R+  EBV D+/R+  Toxo D-/R-      PAST MEDICAL HISTORY:    Past Medical History:   Diagnosis Date    Bariatric surgery status 2003    Benign essential hypertension 05/11/2017    Bilateral carpal tunnel syndrome 11/02/2020    Cardiomyopathy, unspecified (H) 05/08/2017    CKD (chronic kidney disease) stage 3, GFR 30-59 ml/min (H) 05/11/2017    COPD (chronic obstructive pulmonary disease) (H) 11/02/2020    Depression 05/11/2017    Diabetes mellitus (H) 1995    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 7.66 cm, Restrictive pattern , Severe mitral valve regurgitation    CECILIA (obstructive sleep apnea) 05/11/2017    Paroxysmal atrial fibrillation (H) 05/11/2017    Paroxysmal VT (H) 05/11/2017    Peripheral polyneuropathy 03/28/2023    Pulmonary cavitary lesion 11/18/2021    Rotator cuff tear arthropathy of both shoulders 11/02/2020    Type 2 diabetes mellitus with diabetic polyneuropathy (H) 03/18/2022    Uncomplicated asthma     Vitamin B12 deficiency (non anemic) 05/11/2017       CURRENT MEDICATIONS:  Current Outpatient Medications   Medication Sig Dispense Refill    acetaminophen (TYLENOL) 325 MG tablet Take 3 tablets (975 mg) by mouth every 8 hours as needed for mild pain 100 tablet 0    albuterol (PROAIR HFA/PROVENTIL  HFA/VENTOLIN HFA) 108 (90 Base) MCG/ACT inhaler Inhale 2 puffs into the lungs every 4 hours as needed for shortness of breath, wheezing or cough 40.2 g 2    allopurinol (ZYLOPRIM) 300 MG tablet TAKE 1 TABLET BY MOUTH DAILY 100 tablet 3    aspirin (ASA) 81 MG chewable tablet Take 1 tablet (81 mg) by mouth daily 100 tablet 1    buPROPion (WELLBUTRIN) 75 MG tablet TAKE 1 TABLET(75 MG) BY MOUTH TWICE DAILY 180 tablet 3    cyanocobalamin (VITAMIN B-12) 1000 MCG tablet Take 1,000 mcg by mouth daily      diphenhydrAMINE (BENADRYL) 25 MG tablet Take 25 mg by mouth every 6 hours as needed for itching      gabapentin (NEURONTIN) 300 MG capsule TAKE ONE CAPSULE BY MOUTH TWICE A  capsule 3    hydrALAZINE (APRESOLINE) 25 MG tablet Take 1 tablet (25 mg) by mouth 4 times daily for 30 days 120 tablet 0    ipratropium - albuterol 0.5 mg/2.5 mg/3 mL (DUONEB) 0.5-2.5 (3) MG/3ML neb solution INHALE CONTENTS OF 1 VIAL USING NEBULIZER FOUR TIMES DAILY AS DIRECTED for 90 (Patient taking differently: Take 1 vial by nebulization every 4 hours as needed for shortness of breath, wheezing or cough) 90 mL 0    levofloxacin (LEVAQUIN) 750 MG tablet Take 1 tablet (750 mg) by mouth daily 6 tablet 0    Melatonin 10 MG CAPS Take 1 capsule by mouth At Bedtime      methocarbamol (ROBAXIN) 500 MG tablet Take 1 tablet (500 mg) by mouth 4 times daily as needed for muscle spasms 40 tablet 0    montelukast (SINGULAIR) 10 MG tablet TAKE 1 TABLET BY MOUTH AT  BEDTIME 100 tablet 3    mycophenolate (GENERIC EQUIVALENT) 250 MG capsule TAKE one CAPSULE BY MOUTH TWICE A  capsule 3    polyethylene glycol (MIRALAX) 17 GM/Dose powder Take 17 g by mouth daily 510 g 0    rosuvastatin (CRESTOR) 10 MG tablet TAKE ONE TABLET BY MOUTH EVERY DAY 90 tablet 3    sennosides (SENOKOT) 8.6 MG tablet Take 1 tablet by mouth daily as needed for constipation 30 tablet 0    tacrolimus (GENERIC) 0.5 MG capsule TAKE 1 CAPSULE BY MOUTH WITH 3  CAPSULES OF TACROLIMUS 1MG  IN  THE MORNING FOR A TOTAL OF 3.5MG IN THE MORNING (Patient taking differently: TAKE 1 CAPSULE BY MOUTH WITH 3  CAPSULES OF TACROLIMUS 1MG IN  THE EVENING FOR A TOTAL OF 3.5MG IN THE EVENING) 50 capsule 6    tacrolimus (GENERIC) 1 MG capsule TAKE 3 CAPSULES BY MOUTH WITH 1  TACROLIMUS 0.5MG IN THE MORNING  AND 3 CAPSULES IN THE EVENING  FOR TOTAL 3.5MG IN THE MORNING,  3MG IN THE EVENING (Patient taking differently: TAKE 3 CAPSULES BY MOUTH  IN THE MORNING  AND 3 CAPSULES IN THE EVENING  WITH 1  TACROLIMUS 0.5 MG FOR TOTAL 3 MG IN THE MORNING,  3.5 MG IN THE EVENING) 180 capsule 3    traMADol (ULTRAM) 50 MG tablet Take 1 tablet (50 mg) by mouth every 6 hours as needed for moderate pain 12 tablet 0    traMADol (ULTRAM) 50 MG tablet TAKE ONE TABLET BY MOUTH EVERY MORNING AND AFTERNOON AND 2 AT BEDTIME (Patient taking differently: TAKE ONE TABLET BY MOUTH EVERY MORNING AND AFTERNOON AND 2 AT BEDTIME AS NEEDED FOR PAIN) 120 tablet 0       ROS:   Constitutional: No fever, chills, or sweats. Weight stable   ENT: No visual disturbance, ear ache, epistaxis, sore throat.   Allergies/Immunologic: Negative.   Respiratory: some chronic cough, no hemoptysis.   Cardiovascular: As per HPI.   GI: No nausea, vomiting, hematemesis, melena, or hematochezia. Just had bowel surgery    : No urinary frequency, dysuria, or hematuria.   Integument: Negative.   Psychiatric: Negative.   Neuro: Negative  Endocrinology: neg   Musculoskeletal: gout controlled       Exam:  BP (!) 152/88 (BP Location: Right arm, Patient Position: Chair, Cuff Size: Adult Regular)   Pulse 110   Wt 91.5 kg (201 lb 11.2 oz)   SpO2 97%   BMI 30.67 kg/m      General: the patient is a pleasant male in no apparent distress.    HEENT: NC/AT. PERRLA. EOMI.  Sclerae white, not injected.    Neck:  No adenopathy, No thyromegaly.    Cardiac: No jugular venous distention when sitting upright.  RRR.  Normal S1 S2 splits physiologically.  No murmur, rub click, or gallop.      Abdomen: soft, nontender, nondistended.  No organomegaly.  Extremities:  No clubbing, cyanosis, or LE edema.    Neuro: Alert & Oriented x 3, grossly non focal.  Integument: no open lesions, rashes, or jaundice.      DSA negative  allomap 35      Last Immuknow: 348     Cr 1.4 (improved)   Na 137   K 5.4     WBC 7.7   Hgb: 11.6   Plt: 212      Assessment and Plan:  Mr. Jim Willingham is a 68 yr old male with a history of NICM (s/p HM2 LVAD 6/2017) s/p OHT 5/6/21     Initial post transplant course was complex as detailed above but he is doing very well now.      graft function is normal, he has no history of rejection, immunknow with a goal range he has no donor specific antibodies.       Immunosuppression:  - MMF 500mg twice daily  - tacro, goal level 4-6.       PPx:  - CAV:  Aspirin 81mg daily and rosuvastatin 10mg daily.  - GI: Pantoprazole 40mg daily  - Osteoporosis: Calcium/vitamin D supplements     Rejection history:  none  AlloMap scores:  < 35 recently  DSAs:  none      Serostatus:  - CMV D+/R+  - EBV D+/R+  - Toxo D-/R-     KALI cavitary lesion  +Aspergillus (9/2021)    S/p 2 months of voriconazole- resolved .     EBV viremia- low  level   - stable He remains asymptomatic.      Recurrent CMV - getting frequency surveillance and running MMF low      CKD-stable to improved     Recent SBO: recovering     Recent aspiration with wheezing- 20 mg pred given x 5 days     Health care maintenance: per coordinator note     RTC for 2 nd annual         CC  Patient Care Team:  Ricardo Gómez MD as PCP - General (Internal Medicine)        Please do not hesitate to contact me if you have any questions/concerns.     Sincerely,     Delisa Montgomery MD

## 2024-02-29 NOTE — OR NURSING
Upon arrival to [facility] from the OR, Pt obstructing and was not moving air. O2 saturations were down to 74% and jaw thrust done with poor results. Pt had oral airway in place. Ambu bag was needed to get O2 above 90%. Using ambu was difficult as air not moving into lungs easily. Bagging Pt started around 0455. Anesthesia resident (Dr. Torrez) gave Pt Sugammadex 200 mg at 0500 and an albuteral neb given at 0510. RT called and Pt placed on CPAP at 0520. Pt did not tolerate Cpap and became agitated and tried to get out of bed many times, not following commands and not seeming to understand directions. Several staff needed to hold pt in bed. Pt also given precedex 40 mcg. Chest x-ray done at 0535. Both Dr. Torrez and Dr. Owen at bedside during event. Pt taken off Cpap and placed on simple face mask. Pt able to keep O2 saturations between 88-93%. Pt continues to has episodes of agitation and trying to get out of bed. Dr. Whelan (Anesth.) called to bedside also. Haldol given. Pt using accessory neck muscles to help him with breathing as well as paradoxical breathing. BBS are diminished and Pt gurgles heard in upper airway.  
No, to be performed by RN per instructions

## 2024-03-06 NOTE — Clinical Note
[address] [address]  [phone]  Dept: [phone]    March 6, 2024    Bill Smith  [address] [place] [id number]    Dear Bill,    At [facility] we care about your health and are committed to providing quality patient care.     Here is a list of Health Maintenance topics that are due now or due soon:  Health Maintenance Due   Topic Date Due    DIABETIC FOOT EXAM  10/04/2023    PHQ-2 (once per calendar year)  01/01/2024    A1C  01/06/2024        We are recommending that you:     Schedule a Diabetes Follow-Up Office Appointment: You are due to be seen with your primary care provider for a diabetes follow up office appointment. Please schedule this as soon as you are able.    To schedule an appointment or discuss this further, you may contact us by phone at the [facility] at [phone] or online through the patient portal/[email or web address] @ [email or web address]    Thank you for trusting [facility] and we appreciate the opportunity to serve you.  We look forward to supporting your healthcare needs in the future.    Your partners in health,      Quality Committee at [facility]                     Potential access sites were evaluated for patency using ultrasound.   The right jugular vein was selected. Access was obtained under with Sonosite guidance using a standard 18 guage needle with direct visualization of needle entry.

## 2024-03-21 PROBLEM — N40.1 BPH WITH OBSTRUCTION/LOWER URINARY TRACT SYMPTOMS: Status: ACTIVE | Noted: 2024-01-01

## 2024-03-21 PROBLEM — D84.9 IMMUNOCOMPROMISED PATIENT (H): Status: ACTIVE | Noted: 2024-01-01

## 2024-03-21 PROBLEM — N13.8 BPH WITH OBSTRUCTION/LOWER URINARY TRACT SYMPTOMS: Status: ACTIVE | Noted: 2024-01-01

## 2024-03-21 PROBLEM — K91.1 POSTSURGICAL DUMPING SYNDROME: Status: ACTIVE | Noted: 2024-01-01

## 2024-03-21 NOTE — NURSING NOTE
Prior to immunization administration, verified patients identity using patient s name and date of birth. Please see Immunization Activity for additional information.     Screening Questionnaire for Adult Immunization    Are you sick today?   No   Do you have allergies to medications, food, a vaccine component or latex?   No   Have you ever had a serious reaction after receiving a vaccination?   No   Do you have a long-term health problem with heart, lung, kidney, or metabolic disease (e.g., diabetes), asthma, a blood disorder, no spleen, complement component deficiency, a cochlear implant, or a spinal fluid leak?  Are you on long-term aspirin therapy?   No   Do you have cancer, leukemia, HIV/AIDS, or any other immune system problem?   No   Do you have a parent, brother, or sister with an immune system problem?   No   In the past 3 months, have you taken medications that affect  your immune system, such as prednisone, other steroids, or anticancer drugs; drugs for the treatment of rheumatoid arthritis, Crohn s disease, or psoriasis; or have you had radiation treatments?   No   Have you had a seizure, or a brain or other nervous system problem?   No   During the past year, have you received a transfusion of blood or blood    products, or been given immune (gamma) globulin or antiviral drug?   No   For women: Are you pregnant or is there a chance you could become       pregnant during the next month?   No   Have you received any vaccinations in the past 4 weeks?   No     Immunization questionnaire answers were all negative.      Patient instructed to remain in clinic for 15 minutes afterwards, and to report any adverse reactions.     Screening performed by Flor Ku MA on 3/21/2024 at 9:37 AM.

## 2024-03-21 NOTE — PROGRESS NOTES
"  Assessment & Plan     1.  S/p laparoscopy with lysis of adhesion for small bowel obstruction on 8/3/2016.  Patient is clinically doing well now.  No obstructive symptoms.  2.  Status post orthotopic heart transplant 05/05/2021.  Clinically doing well.  3.  Chronic kidney disease stage IIIb.  I will be checking renal panel and UA today.  4.  Benign prostatic hypertrophy with lower urinary symptoms.  He will try tamsulosin 0.4 mg daily and see how much it helps.  Currently has frequency and urgency.  5.  Major depression recurrent with some minor increase symptoms lately.  Currently taking bupropion 150 mg total a day.  Will bump it up to bupropion  mg daily.  6.  Postsurgical dumping syndrome secondary to #7   Patient advised to eat small meals and prevent carbohydrate load.  7.  History of Sylvester-en-Y gastric bypass surgery in 2005.  8.  Chronic obstructive lung disease.  Currently stable uses albuterol on a as needed basis.  9.  Gouty arthropathy chronic with no recent episodes since 2021.  Remains on allopurinol 300 mg daily.  10.  Hyperlipidemia adequately treated with rosuvastatin 10 mg a day.  11.  Impaired fasting glucose.  Prior to having lost a lot of weight from bypass, he used to be type II diabetic.  13.  Mild anemia most likely related to chronic kidney disease.  14.  History of adenomatous colon polyp removed in 2016.  Patient referred for colonoscopy exam.  15.  COVID-19 vaccine provided today.  16.  Immunocompromised patient.  17.  Screening for prostate cancer by checking PSA.    Time spent about 50 minutes doing chart review, review of test results, patient visit and documentation.      BMI  Estimated body mass index is 29.65 kg/m  as calculated from the following:    Height as of 1/3/24: 1.727 m (5' 8\").    Weight as of this encounter: 88.5 kg (195 lb).         Subjective   [place] is a 68 year old, presenting for the following health issues:  Follow Up (Small bowl surgery in January)      " 3/21/2024     8:42 AM   Additional Questions   Roomed by Nusrat BARDALES   Accompanied by self     History of Present Illness     Asthma:  He presents for follow up of asthma.  He has no cough, some wheezing, and some shortness of breath.  He is using a relief medication daily. He does not have a controller medication. Patient is aware of the following triggers: same as previous visit. The patient has had a visit to the Emergency Room, Urgent Care or Hospital due to asthma since the last clinic visit. He has been to the Emergency Room or Urgent Care 1 time.He has had a Hospitalization 0 times.    Back Pain:  He presents for follow up of back pain. Patient's back pain is a chronic problem.  Location of back pain:  Right lower back, left lower back, right middle of back, left middle of back, right shoulder and left shoulder  Description of back pain: gnawing  Back pain spreads: nowhere    Since patient first noticed back pain, pain is: always present, but gets better and worse  Does back pain interfere with his job:  Not applicable       CKD: He uses over the counter pain medication, including Tylenol, a few times a month.    COPD:  He presents for follow up of COPD.  Overall, COPD symptoms are stable since last visit.  He has same as usual fatigue or shortness of breath with exertion and no shortness of breath at rest.  He rarely coughs and does not have change in sputum. No recent fever. He can walk less than 1 block without stopping to rest. He can walk 2 flights of stairs without resting. The patient has had no ED, urgent care, or hospital admissions because of COPD since the last visit.     Mental Health Follow-up:  Patient presents to follow-up on Depression & Anxiety.Patient's depression since last visit has been:  Medium  The patient is having other symptoms associated with depression.  Patient's anxiety since last visit has been:  Medium  The patient is having other symptoms associated with anxiety.  Any significant  life events: job concerns, financial concerns and grief or loss  Patient is feeling anxious or having panic attacks.  Patient has no concerns about alcohol or drug use.    Diabetes:   He presents for follow up of diabetes.    He is not checking blood glucose.         He has no concerns regarding his diabetes at this time.  He is having numbness in feet and burning in feet.  The patient has had a diabetic eye exam in the last 12 months. Eye exam performed on 1/24. Location of last eye exam In home.        He eats 2-3 servings of fruits and vegetables daily.He consumes 0 sweetened beverage(s) daily.He exercises with enough effort to increase his heart rate 9 or less minutes per day.  He exercises with enough effort to increase his heart rate 3 or less days per week. He is missing 1 dose(s) of medications per week.  He is not taking prescribed medications regularly due to remembering to take.       Patient with multiple problems listed in the problem list comes in today for follow-up regarding hospitalization on 1/4/2024 with small bowel obstruction.  He underwent laparoscopic lysis of adhesion and the symptoms have since resolved.  He is requesting COVID booster vaccine today.  His mood has been down a little bit more lately though he is not suicidal.  He is currently taking Wellbutrin at 1250 mg a day.  He complains of nocturia x 3 and some daytime urgency.  No dysuria.  Otherwise he is doing okay.  He takes his medication as prescribed.          Review of Systems  Constitutional, HEENT, cardiovascular, pulmonary, GI, , musculoskeletal, neuro, skin, endocrine and psych systems are negative, except as otherwise noted.      Objective    /89 (BP Location: Right arm, Patient Position: Sitting, Cuff Size: Adult Large)   Pulse 101   Temp 97.5  F (36.4  C) (Oral)   Resp 20   Wt 88.5 kg (195 lb)   SpO2 99%   BMI 29.65 kg/m    Body mass index is 29.65 kg/m .  Physical Exam               Signed Electronically by:  Ricardo Gómez MD  foll

## 2024-03-26 NOTE — TELEPHONE ENCOUNTER
The patient has indicated an allergy to SULFA and has been prescribed FLOMAX CAP 0.4 MG. Please verify if the prescriber is aware of the allergy/potential cross-sensitivity.

## 2024-03-27 NOTE — TELEPHONE ENCOUNTER
Reviewed with pharmacist and noted  Evidence suggests that a cross-sensitivity between sulfa antibiotics and tamsulosin is unlikely and would recommend having patient monitor for any signs of an allergic reaction

## 2024-04-11 NOTE — TELEPHONE ENCOUNTER
Pre assessment completed for upcoming procedure.      Procedure details:    Patient scheduled for Colonoscopy  on 4.18.24.     Arrival time: 1200. Procedure time 1300    Facility location: [facility]; [address], [address], [place] [address]. Check in location: Main entrance at registration desk.    Sedation type: Conscious sedation     Pre op exam needed? N/A    Indication for procedure: Hx polyps    Designated  policy reviewed. Instructed to have someone stay 6 hours post procedure.       Chart review:     Electronic implanted devices? No    Recent diagnosis of diverticulitis within the last 6 weeks?  No    Diabetic? No      Medication review:    Anticoagulants? No    NSAIDS? No    Other medication HOLDING recommendations:  N/A      Prep for procedure:     Bowel prep recommendation: Standard Golytely. Bowel prep prescription sent to      [id number] [address]    Due to: CKD noted.     Prep instructions sent via Clout     Reviewed procedure prep instructions.     Patient verbalized understanding and had no questions or concerns at this time.        Isabel Calvillo RN  Endoscopy Procedure Pre Assessment RN  [phone] option 4

## 2024-04-11 NOTE — TELEPHONE ENCOUNTER
Called and updated pharmacist with information needed.    Sunshine Jc RN  Rheumatology Clinic     [FreeTextEntry1] : Plan: - Prostate cancer: Continue oncology follow up

## 2024-04-18 NOTE — H&P
Jim Willingham  [id number]  male  68 year old      Reason for procedure/surgery: hx polyps. Hx heart tx    Patient Active Problem List   Diagnosis    Stage 3b chronic kidney disease (H)    H/O gastric bypass    Vitamin B12 deficiency (non anemic)    Iron deficiency anemia    Bilateral carpal tunnel syndrome    Rotator cuff tear arthropathy of both shoulders    COPD (chronic obstructive pulmonary disease) (H)    Major depression, recurrent, chronic (H24)    Gouty arthropathy, chronic, without tophi    Need for prophylactic antibiotic    S/P orthotopic heart transplant (H)    Abnormal CT scan of lung    Generalized muscle weakness    Pulmonary cavitary lesion    Encounter for monitoring tacrolimus therapy    Hx of aspergillosis    Leigh-Barr virus viremia    Mild anemia    Status post coronary angiogram    Hyperlipidemia LDL goal <70    Peripheral polyneuropathy    Transplanted heart (H)    Thyroid disorder screening    Lipid screening    Prostate cancer screening    Asthma    History of adenomatous polyp of colon    HTN (hypertension)    Hypogonadism in male    NSTEMI (non-ST elevation myocardial infarction) (H)    Paresthesias    IFG (impaired fasting glucose)    Generalized osteoarthritis    Small bowel obstruction (H)    Immunocompromised patient (H24)    BPH with obstruction/lower urinary tract symptoms    Postsurgical dumping syndrome       Past Surgical History:    Past Surgical History:   Procedure Laterality Date    ANESTHESIA CARDIOVERSION N/A 05/11/2020    Procedure: ANESTHESIA, FOR CARDIOVERSION @1100;  Surgeon: GENERIC ANESTHESIA PROVIDER;  Location: [facility] OR    BRONCHOSCOPY (RIGID OR FLEXIBLE), DIAGNOSTIC N/A 8/30/2021    Procedure: BRONCHOSCOPY, WITH BRONCHOALVEOLAR LAVAGE;  Surgeon: Perlman, David Morris, MD;  Location: [facility] GI    CV CORONARY ANGIOGRAM N/A 5/17/2022    Procedure: Coronary Angiogram;  Surgeon: Jeffrey Gibson MD;  Location:  HEART CARDIAC CATH LAB    CV CORONARY ANGIOGRAM  N/A 5/23/2023    Procedure: Coronary Angiogram;  Surgeon: Scout Robins MD;  [facility]    CV HEART BIOPSY N/A 5/13/2021    Procedure: Heart Biopsy;  Surgeon: Scout Robins MD;  [facility]    CV HEART BIOPSY N/A 5/20/2021    Procedure: Heart Biopsy;  Surgeon: Jeffrey Gibson MD;  [facility]    CV HEART BIOPSY N/A 5/27/2021    Procedure: Heart Biopsy;  Surgeon: Jac Dover MD;  [facility]    CV HEART BIOPSY N/A 6/7/2021    Procedure: CV HEART BIOPSY;  Surgeon: Jeffrey Gibson MD;  Location:  HEART CARDIAC CATH LAB    CV HEART BIOPSY N/A 6/21/2021    Procedure: CV HEART BIOPSY;  Surgeon: Scout Robins MD;  [facility]    CV HEART BIOPSY N/A 7/5/2021    Procedure: CV HEART BIOPSY;  Surgeon: Jeffrey Gibson MD;  [facility]    CV HEART BIOPSY N/A 7/16/2021    Procedure: Heart Biopsy;  Surgeon: Amadeo Art MD;  [facility]: [facility]    CV HEART BIOPSY N/A 8/5/2021    Procedure: Heart Biopsy;  Surgeon: Jeffrey Gibson MD;  Location:  HEART CARDIAC CATH LAB    CV HEART BIOPSY N/A 8/31/2021    Procedure: Heart Cath Heart Biopsy;  Surgeon: Jeffrey Gibson MD;  Location:  HEART CARDIAC CATH LAB    CV HEART BIOPSY N/A 9/21/2021    Procedure: CV HEART BIOPSY;  Surgeon: Jeffrey Gibson MD;  Location:  HEART CARDIAC CATH LAB    CV HEART BIOPSY N/A 10/5/2021    Procedure: CV HEART BIOPSY;  Surgeon: Jeffrey Gibson MD;  [facility]    CV HEART BIOPSY N/A 11/4/2021    Procedure: CV HEART BIOPSY;  Surgeon: Nicola Seth MD;  Location:  HEART CARDIAC CATH LAB    CV HEART BIOPSY N/A 5/17/2022    Procedure: Heart Biopsy;  Surgeon: Jeffrey Gibson MD;  Location:  HEART CARDIAC CATH  LAB    CV HEART BIOPSY N/A 5/23/2023    Procedure: Heart Biopsy;  Surgeon: Scout Robins MD;  [facility]    CV RIGHT HEART CATH MEASUREMENTS RECORDED N/A 07/24/2019    Procedure: CV RIGHT HEART CATH;  Surgeon: Renu Sears MD;  [facility]    CV RIGHT HEART CATH MEASUREMENTS RECORDED N/A 08/05/2020    Procedure: CV RIGHT HEART CATH;  Surgeon: Nicola Seth MD;  Location:  HEART CARDIAC CATH LAB    CV RIGHT HEART CATH MEASUREMENTS RECORDED N/A 01/07/2021    Procedure: CV RIGHT HEART CATH;  Surgeon: Jac Dover MD;  Location:  HEART CARDIAC CATH LAB    CV RIGHT HEART CATH MEASUREMENTS RECORDED N/A 02/23/2021    Procedure: Heart Cath Right Heart Cath;  Surgeon: Jeffrey Gibson MD;  Location:  HEART CARDIAC CATH LAB    CV RIGHT HEART CATH MEASUREMENTS RECORDED N/A 03/23/2021    Procedure: Heart Cath Right Heart Cath. request for 3/23;  Surgeon: Jeffrey Gibson MD;  Location:  HEART CARDIAC CATH LAB    CV RIGHT HEART CATH MEASUREMENTS RECORDED N/A 5/13/2021    Procedure: Right Heart Cath;  Surgeon: Scout Robins MD;  Location:  HEART CARDIAC CATH LAB    CV RIGHT HEART CATH MEASUREMENTS RECORDED N/A 5/20/2021    Procedure: Right Heart Cath;  Surgeon: Jeffrey Gibson MD;  Location:  HEART CARDIAC CATH LAB    CV RIGHT HEART CATH MEASUREMENTS RECORDED N/A 5/27/2021    Procedure: Right Heart Cath;  Surgeon: Jac Dover MD;  Location:  HEART CARDIAC CATH LAB    CV RIGHT HEART CATH MEASUREMENTS RECORDED N/A 6/7/2021    Procedure: CV RIGHT HEART CATH;  Surgeon: Jeffrey Gibson MD;  Location:  HEART CARDIAC CATH LAB    CV RIGHT HEART CATH MEASUREMENTS RECORDED N/A 6/21/2021    Procedure: CV RIGHT HEART CATH;  Surgeon: Scout Robins MD;  Location:  HEART CARDIAC CATH LAB    CV RIGHT HEART CATH MEASUREMENTS RECORDED N/A 7/5/2021    Procedure: CV RIGHT  HEART CATH;  Surgeon: Jeffrey Gibson MD;  Location:  HEART CARDIAC CATH LAB    CV RIGHT HEART CATH MEASUREMENTS RECORDED N/A 7/16/2021    Procedure: Right Heart Cath;  Surgeon: Amadeo Art MD;  Location:  HEART CARDIAC CATH LAB    CV RIGHT HEART CATH MEASUREMENTS RECORDED N/A 8/5/2021    Procedure: CV RIGHT HEART CATH;  Surgeon: Jeffrey Gibson MD;  Location:  HEART CARDIAC CATH LAB    CV RIGHT HEART CATH MEASUREMENTS RECORDED N/A 8/31/2021    Procedure: Heart Cath Right Heart Cath;  Surgeon: Jeffrey Gibson MD;  Location:  HEART CARDIAC CATH LAB    CV RIGHT HEART CATH MEASUREMENTS RECORDED N/A 9/21/2021    Procedure: CV RIGHT HEART CATH;  Surgeon: Jeffrey Gibson MD;  Location:  HEART CARDIAC CATH LAB    CV RIGHT HEART CATH MEASUREMENTS RECORDED N/A 10/5/2021    Procedure: CV RIGHT HEART CATH;  Surgeon: Jeffrey Gibson MD;  Location:  HEART CARDIAC CATH LAB    CV RIGHT HEART CATH MEASUREMENTS RECORDED N/A 11/4/2021    Procedure: CV RIGHT HEART CATH;  Surgeon: Nicola Seth MD;  Location:  HEART CARDIAC CATH LAB    CV RIGHT HEART CATH MEASUREMENTS RECORDED N/A 5/17/2022    Procedure: Right Heart Catheterization;  Surgeon: Jeffrey Gibson MD;  Location:  HEART CARDIAC CATH LAB    CV RIGHT HEART CATH MEASUREMENTS RECORDED N/A 5/23/2023    Procedure: Right Heart Catheterization;  Surgeon: Scout Robins MD;  Location:  HEART CARDIAC CATH LAB    GI SURGERY  2003    Sylvester en Y    INSERT VENTRICULAR ASSIST DEVICE LEFT (HEARTMATE II) N/A 06/19/2017    Procedure: INSERT VENTRICULAR ASSIST DEVICE LEFT (HEARTMATE II);  Median Sternotomy Heartmate II Left Ventricular Assist Device Insertion on Pump Oxygenator;  Surgeon: Ronnie Quigley MD;  [facility]    IR CHEST TUBE PLACEMENT NON-TUNNELED RIGHT  7/11/2021    LAPAROSCOPY DIAGNOSTIC (GENERAL) N/A 1/4/2024    Procedure: Diagnostic Laparoscopy,  Exploratory Laparotomy with Extensive lysis of adhesions.;  Surgeon: Frederick Montgomery MD;  Location: [facility]    ORTHOPEDIC SURGERY  1994    right knee wired    PICC DOUBLE LUMEN PLACEMENT Right 09/23/2020    5FR PICC DL. Length-43cm (1cm out).    PICC INSERTION - DOUBLE LUMEN Right 05/09/2021    IK/BRACH    RELEASE CARPAL TUNNEL BILATERAL Bilateral 02/18/2021    Procedure: Bilateral carpal tunnel release;  Surgeon: Jermaine Brand MD;  Location: [facility] OR    TRANSPLANT HEART RECIPIENT N/A 05/05/2021    Procedure: Redo median sternotomy, lysis of adhesions, heart transplant recipient, on cardiopulmonary bypass, intraoperative transesophageal echocardiogram per anesthesia, Implantable Cardioverter Defibrillator (ICD) removal;  Surgeon: Ronnie Quigley MD;  Location: [facility]       Past Medical History:   Past Medical History:   Diagnosis Date    Bariatric surgery status 2003    Benign essential hypertension 05/11/2017    Bilateral carpal tunnel syndrome 11/02/2020    BPH with obstruction/lower urinary tract symptoms 03/21/2024    Cardiomyopathy, unspecified (H) 05/08/2017    CKD (chronic kidney disease) stage 3, GFR 30-59 ml/min (H) 05/11/2017    COPD (chronic obstructive pulmonary disease) (H) 11/02/2020    Depression 05/11/2017    Diabetes mellitus (H) 1995    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    Generalized osteoarthritis 09/26/2023    Gouty arthropathy, chronic, without tophi 11/02/2020    H/O adenomatous polyp of colon 08/03/2016    2 polyps    H/O gastric bypass 05/11/2017    History of type 2 diabetes mellitus 03/28/2023    Hyperlipidemia LDL goal <70 09/27/2022    ICD (implantable cardioverter-defibrillator), biventricular, in situ 05/11/2017    IFG (impaired fasting glucose) 09/26/2023    LVAD (left ventricular assist device) present (H)     Major depression, recurrent, chronic (H24) 11/02/2020    Mild anemia 03/18/2022    NICM (nonischemic cardiomyopathy) (H)/ EF 20% 05/11/2017    ECHO: LVEDd. 7.66 cm,  Restrictive pattern , Severe mitral valve regurgitation    CECILIA (obstructive sleep apnea) 2017    Paroxysmal atrial fibrillation (H) 2017    Paroxysmal VT (H) 2017    Peripheral polyneuropathy 2023    Postsurgical dumping syndrome 2024    Pulmonary cavitary lesion 2021    Rotator cuff tear arthropathy of both shoulders 2020    Type 2 diabetes mellitus with diabetic polyneuropathy (H) 2022    Uncomplicated asthma     Vitamin B12 deficiency (non anemic) 2017       Social History:   Social History     Tobacco Use    Smoking status: Former     Current packs/day: 0.00     Types: Cigarettes     Quit date:      Years since quittin.3     Passive exposure: Never    Smokeless tobacco: Never   Substance Use Topics    Alcohol use: No       Family History:   Family History   Problem Relation Age of Onset    Cerebrovascular Disease Mother 64    Diabetes Mother     Hypertension Mother     Coronary Artery Disease Father     Diabetes Type 2  Father     Obesity Brother     Obesity Brother     Cerebrovascular Disease Daughter 40       Allergies:   Allergies   Allergen Reactions    Grass Shortness Of Breath    Ace Inhibitors Cough    Cats     Dust Mites Other (See Comments)     Asthma    Mold Other (See Comments)     Asthma    Penicillins Other (See Comments)     Unknown - childhood exposure    Tolerated Zosyn -2020    Per patient report he has tolerated amoxicillin    Sulfa Antibiotics Other (See Comments) and Unknown     Unknown childhood reaction       Active Medications:   No current outpatient medications on file.       Systemic Review:   CONSTITUTIONAL: NEGATIVE for fever, chills, change in weight  ENT/MOUTH: NEGATIVE for ear, mouth and throat problems  RESP: NEGATIVE for significant cough or SOB  CV: NEGATIVE for chest pain, palpitations or peripheral edema    Physical Examination:   Vital Signs: BP (!) 149/105   Pulse 105   Resp 16   SpO2 98%   GENERAL:  healthy, alert and no distress  NECK: no adenopathy, no asymmetry, masses, or scars  RESP: lungs clear to auscultation - no rales, rhonchi or wheezes  CV: regular rate and rhythm, normal S1 S2, no S3 or S4, no murmur, click or rub, no peripheral edema and peripheral pulses strong  ABDOMEN: soft, nontender, no hepatosplenomegaly, no masses and bowel sounds normal  MS: no gross musculoskeletal defects noted, no edema    Plan: Appropriate to proceed as scheduled.      Jermaine Rhodes MD  4/18/2024    PCP:  Ricardo Gómez

## 2024-04-18 NOTE — LETTER
April 19, 2024      Jim DAGO Willingham  [address] [address]        Dear ,    The pathology results returned from the polyps removed at your recent colonoscopy.    Three polyps were pre-cancerous but showed no active evidence of cancer.      Current guidelines recommend that you undergo a follow-up colonoscopy in 3-5 years.    Sincerely,               Jermaine Rhodes MD   [facility], [place], ENDOSCOPY  [address] [place] [address]  Phone: [phone]

## 2024-05-14 NOTE — TELEPHONE ENCOUNTER
Pt c/o SOB, dizziness, and fatigue. No SOB. /79. HR 93. No c/o palpitations. SOB comes and goes.  Pt is worried about being in Afib. EKG ordered and patient will present to clinic for appt.

## 2024-05-14 NOTE — TELEPHONE ENCOUNTER
Patient Call: General  Route to LPN    Reason for call: Patient stated he's having  Symptoms  of  being dizzy, light headed, weak, and has  SOB, that comes and goes, and hopes he's not in A-fib.     Call back needed? Yes    Return Call Needed  Same as documented in contacts section  When to return call?: Same day: Route High Priority

## 2024-05-15 NOTE — TELEPHONE ENCOUNTER
EKG reviewed with Dr. Montaño. No indication of afib. [place] states no c/o sob today. Echo ordered and message sent to Dr. Montgomery

## 2024-05-20 PROBLEM — N17.9 ACUTE KIDNEY INJURY (H): Status: ACTIVE | Noted: 2024-01-01

## 2024-05-20 PROBLEM — Z94.1 HEART REPLACED BY TRANSPLANT (H): Status: ACTIVE | Noted: 2024-01-01

## 2024-05-20 PROBLEM — R79.89 ELEVATED TROPONIN: Status: ACTIVE | Noted: 2024-01-01

## 2024-05-20 PROBLEM — I50.9 NEW ONSET OF CONGESTIVE HEART FAILURE (H): Status: ACTIVE | Noted: 2024-01-01

## 2024-05-20 NOTE — H&P
Cardiology 2 Note    05/20/2024    Jim Willingham MRN# [id number]   Age: 68 year old YOB: 1955        HPI   Jim Willingham is a 67yo man with a history of NICM s/p HM2 LVAD in 6/2017 now s/p OHT 5/2021. He is being admitted on 5/20 for progressive dyspnea and volume overload.     Started to notice symptoms ~10 days prior to admission. Primary symptom of concern is dyspnea and fatigue. Ordinarily has no exertional limits, now cannot ambulate across the room without dyspnea. Has also noticed concurrent ~15 pound weight gain (baseline weight 187) in addition to abdominal bloating and intermittent pleuritic chest pain. Also having some generalized headaches and vertigo during this same time period. No orthopnea. Ultimately it was the progressive dyspnea that lead him to present to ER today. Has been adherent to his medications without issues and no misses.     In regards to his cardiac history, he follows with Dr. Montgomery. 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     Since this time however he reports doing well clinically until more recently as described above.     Past Medical History     Patient Active Problem List   Diagnosis    Stage 3b chronic kidney disease (H)    H/O gastric bypass    Vitamin B12 deficiency (non anemic)    Iron deficiency anemia    Bilateral carpal tunnel syndrome     Rotator cuff tear arthropathy of both shoulders    COPD (chronic obstructive pulmonary disease) (H)    Major depression, recurrent, chronic (H24)    Gouty arthropathy, chronic, without tophi    Need for prophylactic antibiotic    S/P orthotopic heart transplant (H)    Abnormal CT scan of lung    Generalized muscle weakness    Pulmonary cavitary lesion    Encounter for monitoring tacrolimus therapy    Hx of aspergillosis    Leigh-Barr virus viremia    Mild anemia    Status post coronary angiogram    Hyperlipidemia LDL goal <70    Peripheral polyneuropathy    Transplanted heart (H)    Thyroid disorder screening    Lipid screening    Prostate cancer screening    Asthma    History of adenomatous polyp of colon    HTN (hypertension)    Hypogonadism in male    NSTEMI (non-ST elevation myocardial infarction) (H)    Paresthesias    IFG (impaired fasting glucose)    Generalized osteoarthritis    Small bowel obstruction (H)    Immunocompromised patient (H24)    BPH with obstruction/lower urinary tract symptoms    Postsurgical dumping syndrome       Social History     Social History     Tobacco Use    Smoking status: Former     Current packs/day: 0.00     Types: Cigarettes     Quit date:      Years since quittin.4     Passive exposure: Never    Smokeless tobacco: Never   Vaping Use    Vaping status: Never Used   Substance Use Topics    Alcohol use: No    Drug use: No        Family History     Family History   Problem Relation Age of Onset    Cerebrovascular Disease Mother 64    Diabetes Mother     Hypertension Mother     Coronary Artery Disease Father     Diabetes Type 2  Father     Obesity Brother     Obesity Brother     Cerebrovascular Disease Daughter 40        Past Surgical History     Past Surgical History:   Procedure Laterality Date    ANESTHESIA CARDIOVERSION N/A 2020    Procedure: ANESTHESIA, FOR CARDIOVERSION @1100;  Surgeon: GENERIC ANESTHESIA PROVIDER;  Location: [facility]    BRONCHOSCOPY (RIGID OR  FLEXIBLE), DIAGNOSTIC N/A 8/30/2021    Procedure: BRONCHOSCOPY, WITH BRONCHOALVEOLAR LAVAGE;  Surgeon: Perlman, David Morris, MD;  Location:  GI    COLONOSCOPY N/A 4/18/2024    Procedure: COLONOSCOPY, WITH POLYPECTOMY;  Surgeon: Jermaine Root MD;  Location: [facility]    CV CORONARY ANGIOGRAM N/A 5/17/2022    Procedure: Coronary Angiogram;  Surgeon: Jeffrey Gibson MD;  [facility]    CV CORONARY ANGIOGRAM N/A 5/23/2023    Procedure: Coronary Angiogram;  Surgeon: Scout Robins MD;  [facility]    CV HEART BIOPSY N/A 5/13/2021    Procedure: Heart Biopsy;  Surgeon: Scout Robins MD;  [facility]    CV HEART BIOPSY N/A 5/20/2021    Procedure: Heart Biopsy;  Surgeon: Jeffrey Gibson MD;  [facility]    CV HEART BIOPSY N/A 5/27/2021    Procedure: Heart Biopsy;  Surgeon: Jac Dover MD;  [facility]    CV HEART BIOPSY N/A 6/7/2021    Procedure: CV HEART BIOPSY;  Surgeon: Jeffrey Gibson MD;  [facility]    CV HEART BIOPSY N/A 6/21/2021    Procedure: CV HEART BIOPSY;  Surgeon: Scout Robins MD;  [facility]    CV HEART BIOPSY N/A 7/5/2021    Procedure: CV HEART BIOPSY;  Surgeon: Jeffrey Gibson MD;  [facility]    CV HEART BIOPSY N/A 7/16/2021    Procedure: Heart Biopsy;  Surgeon: Amadeo Art MD;  [facility]    CV HEART BIOPSY N/A 8/5/2021    Procedure: Heart Biopsy;  Surgeon: Jeffrey Gibson MD;  [facility]    CV HEART BIOPSY N/A 8/31/2021    Procedure: Heart Cath Heart Biopsy;  Surgeon: Jeffrey Gibson MD;  Location:  [facility]    CV HEART BIOPSY N/A 9/21/2021    Procedure: CV HEART BIOPSY;  Surgeon: Jeffrey Gibson,  MD;  Location:  [facility] CATH [facility]    CV HEART BIOPSY N/A 10/5/2021    Procedure: CV HEART BIOPSY;  Surgeon: Jeffrey Gibson MD;  [facility]    CV HEART BIOPSY N/A 11/4/2021    Procedure: CV HEART BIOPSY;  Surgeon: Nicola Seth MD;  [facility]    CV HEART BIOPSY N/A 5/17/2022    Procedure: Heart Biopsy;  Surgeon: Jeffrey Gibson MD;  [facility]    CV HEART BIOPSY N/A 5/23/2023    Procedure: Heart Biopsy;  Surgeon: Scout Robins MD;  Location:  [facility]    CV RIGHT HEART CATH MEASUREMENTS RECORDED N/A 07/24/2019    Procedure: CV RIGHT HEART CATH;  Surgeon: Renu Sears MD;  Location:  [facility]ATH [facility]    CV RIGHT HEART CATH MEASUREMENTS RECORDED N/A 08/05/2020    Procedure: CV RIGHT HEART CATH;  Surgeon: Nicola Seth MD;  Location:  [facility]ATH [facility]    CV RIGHT HEART CATH MEASUREMENTS RECORDED N/A 01/07/2021    Procedure: CV RIGHT HEART CATH;  Surgeon: Jac Dover MD;  Location:  [facility]ARD[facility]ATH [facility]    CV RIGHT HEART CATH MEASUREMENTS RECORDED N/A 02/23/2021    Procedure: Heart Cath Right Heart Cath;  Surgeon: Jeffrey Gibson MD;  Location:  [facility]ATH [facility]    CV RIGHT HEART CATH MEASUREMENTS RECORDED N/A 03/23/2021    Procedure: Heart Cath Right Heart Cath. request for 3/23;  Surgeon: Jeffrey Gibson MD;  Location:  [facility]    CV RIGHT HEART CATH MEASUREMENTS RECORDED N/A 5/13/2021    Procedure: Right Heart Cath;  Surgeon: Scout Robins MD;  Location:  [facility]    CV RIGHT HEART CATH MEASUREMENTS RECORDED N/A 5/20/2021    Procedure: Right Heart Cath;  Surgeon: Jeffrey Gibson MD;  Location:  [facility]    CV RIGHT HEART CATH MEASUREMENTS RECORDED N/A 5/27/2021    Procedure: Right Heart Cath;  Surgeon: Jac Dover MD;  Location:   [facility]ATH [facility]    CV RIGHT HEART CATH MEASUREMENTS RECORDED N/A 6/7/2021    Procedure: CV RIGHT HEART CATH;  Surgeon: Jeffrey Gibson MD;  Location:  [facility]    CV RIGHT HEART CATH MEASUREMENTS RECORDED N/A 6/21/2021    Procedure: CV RIGHT HEART CATH;  Surgeon: Scout Robins MD;  Location:  [facility]    CV RIGHT HEART CATH MEASUREMENTS RECORDED N/A 7/5/2021    Procedure: CV RIGHT HEART CATH;  Surgeon: Jeffrey Gibson MD;  Location:  [facility]    CV RIGHT HEART CATH MEASUREMENTS RECORDED N/A 7/16/2021    Procedure: Right Heart Cath;  Surgeon: Amadeo Art MD;  Location:  [facility]    CV RIGHT HEART CATH MEASUREMENTS RECORDED N/A 8/5/2021    Procedure: CV RIGHT HEART CATH;  Surgeon: Jeffrey Gibson MD;  Location:  [facility]    CV RIGHT HEART CATH MEASUREMENTS RECORDED N/A 8/31/2021    Procedure: Heart Cath Right Heart Cath;  Surgeon: Jeffrey Gibson MD;  Location:  [facility]    CV RIGHT HEART CATH MEASUREMENTS RECORDED N/A 9/21/2021    Procedure: CV RIGHT HEART CATH;  Surgeon: Jeffrey Gibson MD;  Location:  [facility]    CV RIGHT HEART CATH MEASUREMENTS RECORDED N/A 10/5/2021    Procedure: CV RIGHT HEART CATH;  Surgeon: Jeffrey Gibson MD;  Location:  [facility]ATH [facility]    CV RIGHT HEART CATH MEASUREMENTS RECORDED N/A 11/4/2021    Procedure: CV RIGHT HEART CATH;  Surgeon: Nicola Seth MD;  Location:  [facility]    CV RIGHT HEART CATH MEASUREMENTS RECORDED N/A 5/17/2022    Procedure: Right Heart Catheterization;  Surgeon: Jeffrey Gibson MD;  Location:  [facility]    CV RIGHT HEART CATH MEASUREMENTS RECORDED N/A 5/23/2023    Procedure: Right Heart Catheterization;  Surgeon: Scout Robins MD;  Location:  [facility]    GI SURGERY   2003    [facility]    INSERT VENTRICULAR ASSIST DEVICE LEFT (HEARTMATE II) N/A 06/19/2017    Procedure: INSERT VENTRICULAR ASSIST DEVICE LEFT (HEARTMATE II);  Median Sternotomy Heartmate II Left Ventricular Assist Device Insertion on Pump Oxygenator;  Surgeon: Ronnie Quigley MD;  [facility]: [facility]    IR CHEST TUBE PLACEMENT NON-TUNNELED RIGHT  7/11/2021    LAPAROSCOPY DIAGNOSTIC (GENERAL) N/A 1/4/2024    Procedure: Diagnostic Laparoscopy, Exploratory Laparotomy with Extensive lysis of adhesions.;  Surgeon: Frederick Montgomery MD;  [facility]    ORTHOPEDIC SURGERY  1994    right knee wired    PICC DOUBLE LUMEN PLACEMENT Right 09/23/2020    5FR PICC DL. Length-43cm (1cm out).    PICC INSERTION - DOUBLE LUMEN Right 05/09/2021    IK/BRACH    RELEASE CARPAL TUNNEL BILATERAL Bilateral 02/18/2021    Procedure: Bilateral carpal tunnel release;  Surgeon: Jermaine Brand MD;  [facility]: [facility]    TRANSPLANT HEART RECIPIENT N/A 05/05/2021    Procedure: Redo median sternotomy, lysis of adhesions, heart transplant recipient, on cardiopulmonary bypass, intraoperative transesophageal echocardiogram per anesthesia, Implantable Cardioverter Defibrillator (ICD) removal;  Surgeon: Ronnie Quigley MD;  Location: [facility]       Prior to Admission Medications:     Current Outpatient Medications   Medication Sig Dispense Refill    acetaminophen (TYLENOL) 325 MG tablet Take 3 tablets (975 mg) by mouth every 8 hours as needed for mild pain 100 tablet 0    albuterol (PROAIR HFA/PROVENTIL HFA/VENTOLIN HFA) 108 (90 Base) MCG/ACT inhaler Inhale 2 puffs into the lungs every 4 hours as needed for shortness of breath, wheezing or cough 40.2 g 2    allopurinol (ZYLOPRIM) 300 MG tablet TAKE 1 TABLET BY MOUTH DAILY 100 tablet 3    aspirin (ASA) 81 MG chewable tablet Take 1 tablet (81 mg) by mouth daily 100 tablet 1    bisacodyl (DULCOLAX) 5 MG EC tablet Two days prior to exam take two (2) tablets at 4pm. One day prior to exam take two (2) tablets at 4pm 4  tablet 0    bisacodyl (DULCOLAX) 5 MG EC tablet Take 2 tablets at 3 pm the day before your procedure. If your procedure is before 11 am, take 2 additional tablets at 11 pm. If your procedure is after 11 am, take 2 additional tablets at 6 am. For additional instructions refer to your colonoscopy prep instructions. 4 tablet 0    buPROPion (WELLBUTRIN XL) 300 MG 24 hr tablet Take 1 tablet (300 mg) by mouth every morning 90 tablet 1    cyanocobalamin (VITAMIN B-12) 1000 MCG tablet Take 1,000 mcg by mouth daily      diphenhydrAMINE (BENADRYL) 25 MG tablet Take 25 mg by mouth every 6 hours as needed for itching      gabapentin (NEURONTIN) 300 MG capsule TAKE ONE CAPSULE BY MOUTH TWICE A  capsule 3    ipratropium - albuterol 0.5 mg/2.5 mg/3 mL (DUONEB) 0.5-2.5 (3) MG/3ML neb solution INHALE CONTENTS OF 1 VIAL USING NEBULIZER FOUR TIMES DAILY AS DIRECTED for 90 (Patient taking differently: Take 1 vial by nebulization every 4 hours as needed for shortness of breath, wheezing or cough) 90 mL 0    Melatonin 10 MG CAPS Take 1 capsule by mouth At Bedtime      methocarbamol (ROBAXIN) 500 MG tablet Take 1 tablet (500 mg) by mouth 4 times daily as needed for muscle spasms 40 tablet 0    montelukast (SINGULAIR) 10 MG tablet TAKE 1 TABLET BY MOUTH AT  BEDTIME 100 tablet 3    mycophenolate (GENERIC EQUIVALENT) 250 MG capsule TAKE one CAPSULE BY MOUTH TWICE A  capsule 3    polyethylene glycol (GOLYTELY) 236 g suspension Take as directed. Two days before your exam fill the first container with water. Cover and shake until mixed well. At 5:00pm drink one 8oz glass every 10-15 minutes until half (1/2) of the first container is empty. Store the remainder in the refrigerator. One day before your exam at 5:00pm drink the second half of the first container until it is gone. Before you go to bed mix the second container with water and put in refrigerator. Six hours before your check in time drink one 8oz glass every 10-15 minutes  until half of container is empty. Discard the remainder of solution. 8000 mL 0    polyethylene glycol (GOLYTELY) 236 g suspension The night before the exam at 6 pm drink an 8-ounce glass every 15 minutes until the jug is half empty. If you arrive before 11 AM: Drink the other half of the Golytely jug at 11 PM night before procedure. If you arrive after 11 AM: Drink the other half of the Golytely jug at 6 AM day of procedure. For additional instructions refer to your colonoscopy prep instructions. 4000 mL 0    polyethylene glycol (MIRALAX) 17 GM/Dose powder Take 17 g by mouth daily 510 g 0    rosuvastatin (CRESTOR) 10 MG tablet TAKE ONE TABLET BY MOUTH EVERY DAY 90 tablet 3    tacrolimus (GENERIC EQUIVALENT) 0.5 MG capsule On HOLD. Currently taking 3 mg bid.      tacrolimus (GENERIC EQUIVALENT) 1 MG capsule TAKE 3 CAPSULES BY MOUTh IN am and in pm. 360 capsule 11    tamsulosin (FLOMAX) 0.4 MG capsule Take 1 capsule (0.4 mg) by mouth daily 90 capsule 1    traMADol (ULTRAM) 50 MG tablet TAKE ONE TABLET BY MOUTH EVERY MORNING AND AFTERNOON AND 2 AT BEDTIME (Patient taking differently: TAKE ONE TABLET BY MOUTH EVERY MORNING AND AFTERNOON AND 2 AT BEDTIME AS NEEDED FOR PAIN) 120 tablet 0        Physical Exam:   /88   Pulse 94   Temp 97.5  F (36.4  C) (Oral)   Resp 20   SpO2 100%   Temp:  [97.5  F (36.4  C)] 97.5  F (36.4  C)  Pulse:  [94] 94  Resp:  [20] 20  BP: (124)/(88) 124/88  SpO2:  [100 %] 100 %  Wt Readings from Last 2 Encounters:   03/21/24 88.5 kg (195 lb)   01/18/24 91.5 kg (201 lb 11.2 oz)     No intake or output data in the 24 hours ending 05/20/24 1523    Exam:  General: NAD   HEENT: no scleral icterus or injection  CARDIAC: RRR, no murmurs. No JVD  RESP:  CTAB  GI: Distended  : No christopher  EXTREMITIES: no LE edema  SKIN: No acute lesions appreciated  NEURO: No focal deficits         Labs:     CMP  Recent Labs   Lab 05/20/24  1403      POTASSIUM 5.1   CHLORIDE 101   CO2 19*   ANIONGAP 15   GLC  124*   BUN 84.9*   CR 3.04*   GFRESTIMATED 22*   QAMAR 8.8   PROTTOTAL 7.1   ALBUMIN 4.4   BILITOTAL 0.6   ALKPHOS 132   AST 48*   ALT 49     CBC  Recent Labs   Lab 05/20/24  1403   WBC 6.2   HGB 11.4*   HCT 35.8*   MCV 94        INRNo lab results found in last 7 days.  Arterial Blood GasNo lab results found in last 7 days.      Recent Imaging:   CXR w/ b/l pleural effusions and cephalizations    EKG w/ RAD and incomplete RBBB    Assessment and Plan:   Jim Willingham is a 69yo man with a history of NICM s/p HM2 LVAD in 6/2017 now s/p OHT 5/2021. He is being admitted on 5/20 for progressive dyspnea and volume overload.     # Volume Overload   Concerning for graft dysfunction. Formal TTE pending but IVC plethoric. Will plan for RHC and biopsy tomorrow in addition to serologic workup as outlined below:  - trend trop  - NPO @ [facility] for RHC w/ biopsy  - AlloMap  - DSA  - Immuknow  - Tacro level    # Status post OHT on 5/2021, history of NICM  # Chronic immunosuppression  * TTE Jan 2024 w/ normal LVEF. Borderline reduced RV function with mild dilation   * RHC May 2023 w/ RA 8, PA 41/19 (26), wedge 15 and cardiac index 3.2; biopsy cellular grade 0R and pAMR 0  * Cors May 2023 w/ minimal nonobstructive CAD (20% pRCA)    Rejection History:  Rejection history:  none  AlloMap scores:  < 35 recently  DSAs:  none    Immunosuppression:   --Cellcept 500 mg BID Q12 hours   --tacrolimus 3.5mg  AM / 3mg PM; goal 4-6    Serostatus: CMV: D+/R+, EBV: D+/R+, Toxo: D-/R-    Prophylaxis:   --CAV: aspirin 81 mg and rosuvastatin 10 mg daily   --Osteoporosis: calcium/vitamin D 500mg BID  --GI: Pantoprazole 40mg daily  --EBV: Known low grade EBV viremia being serially monitored (baseline ~6k)  --CMV: Not needed  --Toxo: Not needed    # WALTER on CKD  Baseline Scr ~1.4. On admission here > 3. Suspect possibly cardiorenal though will rule out a component of post-renal with PVR. No protein in urine to suggest glomerular process.   - strict I/Os  -  renally dose meds, avoid nephrotoxic meds    # Normocytic Anemia  Baseline ~11, at baseline here. Suspect anemia of renal disease  - trend daily    # Elevated Troponin  Will trend to r/o CAV  - trend trops    # Elevated D-Dimer  V/Q scan to r/o PE    #Medication induced antiplatelet effect - on pta aspirin  #Immunocompromised Host - on tacrolimus and mmf  #Prediabetes - A1c 5.8%    #Checklist  Diet: NPO @ [facility]  DVT: SQH  Code: Full    Patient seen and discussed with staff physician, Dr. Danyel Hernandez MD  Cardiology Fellow

## 2024-05-20 NOTE — ED PROVIDER NOTES
ED Provider Note  [facility]      History     Chief Complaint   Patient presents with     Shortness of Breath     HPI  Jim Willingham is a 68-year-old male with a history of cardiomyopathy status post heart transplant in 2021, diabetes mellitus, asthma and COPD who presents to the emergency department with increased shortness of breath.  He reports over the last 2 weeks he has had increased shortness of breath with activity.  He reports when he tries to do activity such as climbing the stairs and gets to the top of stairs he feels lightheaded short of breath and diaphoretic.  He reports his symptoms do improve with resting.  He denies any chest pain but does report that he feels a bandlike sensation around his chest when he is having these episodes.  He reports a mild cough, denies fever.  He has not noted any swelling in his legs though he does note a 7 pound weight gain over the last 2 weeks.  He denies any medication changes.     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     Generalized osteoarthritis 09/26/2023     Gouty arthropathy, chronic, without tophi 11/02/2020     H/O adenomatous polyp of colon 08/03/2016    2 polyps     H/O gastric bypass 05/11/2017     History of type 2 diabetes mellitus 03/28/2023     Hyperlipidemia LDL goal <70 09/27/2022     ICD (implantable cardioverter-defibrillator), biventricular, in situ 05/11/2017     IFG (impaired fasting glucose) 09/26/2023     LVAD (left  ventricular assist device) present (H)      Major depression, recurrent, chronic (H24) 11/02/2020     Mild anemia 03/18/2022     NICM (nonischemic cardiomyopathy) (H)/ EF 20% 05/11/2017    ECHO: LVEDd. 7.66 cm, Restrictive pattern , Severe mitral valve regurgitation     CECILIA (obstructive sleep apnea) 05/11/2017     Paroxysmal atrial fibrillation (H) 05/11/2017     Paroxysmal VT (H) 05/11/2017     Peripheral polyneuropathy 03/28/2023     Postsurgical dumping syndrome 03/21/2024     Pulmonary cavitary lesion 11/18/2021     Rotator cuff tear arthropathy of both shoulders 11/02/2020     Type 2 diabetes mellitus with diabetic polyneuropathy (H) 03/18/2022     Uncomplicated asthma      Vitamin B12 deficiency (non anemic) 05/11/2017     Past Surgical History:   Procedure Laterality Date     ANESTHESIA CARDIOVERSION N/A 05/11/2020    Procedure: ANESTHESIA, FOR CARDIOVERSION @1100;  Surgeon: GENERIC ANESTHESIA PROVIDER;  Location: [facility] OR     BRONCHOSCOPY (RIGID OR FLEXIBLE), DIAGNOSTIC N/A 8/30/2021    Procedure: BRONCHOSCOPY, WITH BRONCHOALVEOLAR LAVAGE;  Surgeon: Perlman, David Morris, MD;  Location:  GI     COLONOSCOPY N/A 4/18/2024    Procedure: COLONOSCOPY, WITH POLYPECTOMY;  Surgeon: Jermaine Root MD;  Location:  GI     CV CORONARY ANGIOGRAM N/A 5/17/2022    Procedure: Coronary Angiogram;  Surgeon: Jeffrey Gibson MD;  Location: [facility] CARDIAC CATH LAB     CV CORONARY ANGIOGRAM N/A 5/23/2023    Procedure: Coronary Angiogram;  Surgeon: Scout Robins MD;  Location:  HEART CARDIAC CATH LAB     CV HEART BIOPSY N/A 5/13/2021    Procedure: Heart Biopsy;  Surgeon: Scout Robins MD;  Location: [facility] CARDIAC CATH LAB     CV HEART BIOPSY N/A 5/20/2021    Procedure: Heart Biopsy;  Surgeon: Jeffrey Gibson MD;  Location: [facility] CARDIAC CATH LAB     CV HEART BIOPSY N/A 5/27/2021    Procedure: Heart Biopsy;  Surgeon: Jac Dovre MD;  Location: [facility]  CARDIAC CATH LAB     CV HEART BIOPSY N/A 6/7/2021    Procedure: CV HEART BIOPSY;  Surgeon: Jeffrey Gibson MD;  [facility]     CV HEART BIOPSY N/A 6/21/2021    Procedure: CV HEART BIOPSY;  Surgeon: Scout Robins MD;  [facility]     CV HEART BIOPSY N/A 7/5/2021    Procedure: CV HEART BIOPSY;  Surgeon: Jeffrey Gibson MD;  [facility]     CV HEART BIOPSY N/A 7/16/2021    Procedure: Heart Biopsy;  Surgeon: Amadeo Art MD;  Location:  HEART CARDIAC CATH LAB     CV HEART BIOPSY N/A 8/5/2021    Procedure: Heart Biopsy;  Surgeon: Jeffrey Gibson MD;  [facility]     CV HEART BIOPSY N/A 8/31/2021    Procedure: Heart Cath Heart Biopsy;  Surgeon: Jeffrey Gibson MD;  [facility]     CV HEART BIOPSY N/A 9/21/2021    Procedure: CV HEART BIOPSY;  Surgeon: Jeffrey Gibson MD;  Location:  HEART CARDIAC CATH LAB     CV HEART BIOPSY N/A 10/5/2021    Procedure: CV HEART BIOPSY;  Surgeon: Jeffrey Gibson MD;  Location:  HEART CARDIAC CATH LAB     CV HEART BIOPSY N/A 11/4/2021    Procedure: CV HEART BIOPSY;  Surgeon: Nicola Seth MD;  Location:  HEART CARDIAC CATH LAB     CV HEART BIOPSY N/A 5/17/2022    Procedure: Heart Biopsy;  Surgeon: Jeffrey Gibson MD;  Location:  HEART CARDIAC CATH LAB     CV HEART BIOPSY N/A 5/23/2023    Procedure: Heart Biopsy;  Surgeon: Scout Robins MD;  Location:  HEART CARDIAC CATH LAB     CV RIGHT HEART CATH MEASUREMENTS RECORDED N/A 07/24/2019    Procedure: CV RIGHT HEART CATH;  Surgeon: Renu Sears MD;  Location:  HEART CARDIAC CATH LAB     CV RIGHT HEART CATH MEASUREMENTS RECORDED N/A 08/05/2020    Procedure: CV RIGHT HEART CATH;  Surgeon: Nicola Seth MD;  Location:  HEART CARDIAC CATH LAB     CV RIGHT HEART CATH MEASUREMENTS  RECORDED N/A 01/07/2021    Procedure: CV RIGHT HEART CATH;  Surgeon: Jac Dover MD;  [facility]     CV RIGHT HEART CATH MEASUREMENTS RECORDED N/A 02/23/2021    Procedure: Heart Cath Right Heart Cath;  Surgeon: Jeffrey Gibson MD;  [facility]     CV RIGHT HEART CATH MEASUREMENTS RECORDED N/A 03/23/2021    Procedure: Heart Cath Right Heart Cath. request for 3/23;  Surgeon: Jeffrey Gibson MD;  Location:  HEART CARDIAC CATH LAB     CV RIGHT HEART CATH MEASUREMENTS RECORDED N/A 5/13/2021    Procedure: Right Heart Cath;  Surgeon: Scout Robins MD;  Location:  HEART CARDIAC CATH LAB     CV RIGHT HEART CATH MEASUREMENTS RECORDED N/A 5/20/2021    Procedure: Right Heart Cath;  Surgeon: Jeffrey Gibson MD;  Location:  HEART CARDIAC CATH LAB     CV RIGHT HEART CATH MEASUREMENTS RECORDED N/A 5/27/2021    Procedure: Right Heart Cath;  Surgeon: Jac Dover MD;  [facility]     CV RIGHT HEART CATH MEASUREMENTS RECORDED N/A 6/7/2021    Procedure: CV RIGHT HEART CATH;  Surgeon: Jeffrey Gibson MD;  Location:  HEART CARDIAC CATH LAB     CV RIGHT HEART CATH MEASUREMENTS RECORDED N/A 6/21/2021    Procedure: CV RIGHT HEART CATH;  Surgeon: Scout Robins MD;  Location:  HEART CARDIAC CATH LAB     CV RIGHT HEART CATH MEASUREMENTS RECORDED N/A 7/5/2021    Procedure: CV RIGHT HEART CATH;  Surgeon: Jeffrey Gibson MD;  Location:  HEART CARDIAC CATH LAB     CV RIGHT HEART CATH MEASUREMENTS RECORDED N/A 7/16/2021    Procedure: Right Heart Cath;  Surgeon: Amadeo Art MD;  Location:  HEART CARDIAC CATH LAB     CV RIGHT HEART CATH MEASUREMENTS RECORDED N/A 8/5/2021    Procedure: CV RIGHT HEART CATH;  Surgeon: Jeffrey Gibson MD;  Location:  HEART CARDIAC CATH LAB     CV RIGHT HEART CATH MEASUREMENTS RECORDED N/A 8/31/2021     Procedure: Heart Cath Right Heart Cath;  Surgeon: Jeffrey Gibson MD;  Location:  HEART CARDIAC CATH LAB     CV RIGHT HEART CATH MEASUREMENTS RECORDED N/A 9/21/2021    Procedure: CV RIGHT HEART CATH;  Surgeon: Jeffrey Gibson MD;  Location:  HEART CARDIAC CATH LAB     CV RIGHT HEART CATH MEASUREMENTS RECORDED N/A 10/5/2021    Procedure: CV RIGHT HEART CATH;  Surgeon: Jeffrey Gibson MD;  Location:  HEART CARDIAC CATH LAB     CV RIGHT HEART CATH MEASUREMENTS RECORDED N/A 11/4/2021    Procedure: CV RIGHT HEART CATH;  Surgeon: Nicola Seth MD;  Location:  HEART CARDIAC CATH LAB     CV RIGHT HEART CATH MEASUREMENTS RECORDED N/A 5/17/2022    Procedure: Right Heart Catheterization;  Surgeon: Jeffrey Gibson MD;  Location:  HEART CARDIAC CATH LAB     CV RIGHT HEART CATH MEASUREMENTS RECORDED N/A 5/23/2023    Procedure: Right Heart Catheterization;  Surgeon: Scout Robins MD;  Location:  HEART CARDIAC CATH LAB     GI SURGERY  2003    [facility]     INSERT VENTRICULAR ASSIST DEVICE LEFT (HEARTMATE II) N/A 06/19/2017    Procedure: INSERT VENTRICULAR ASSIST DEVICE LEFT (HEARTMATE II);  Median Sternotomy Heartmate II Left Ventricular Assist Device Insertion on Pump Oxygenator;  Surgeon: Ronnie Quigley MD;  Location:  OR     IR CHEST TUBE PLACEMENT NON-TUNNELED RIGHT  7/11/2021     LAPAROSCOPY DIAGNOSTIC (GENERAL) N/A 1/4/2024    Procedure: Diagnostic Laparoscopy, Exploratory Laparotomy with Extensive lysis of adhesions.;  Surgeon: Frederick Montgomery MD;  Location:  OR     ORTHOPEDIC SURGERY  1994    right knee wired     PICC DOUBLE LUMEN PLACEMENT Right 09/23/2020    5FR PICC DL. Length-43cm (1cm out).     PICC INSERTION - DOUBLE LUMEN Right 05/09/2021    IK/BRACH     RELEASE CARPAL TUNNEL BILATERAL Bilateral 02/18/2021    Procedure: Bilateral carpal tunnel release;  Surgeon: Jermaine Brand MD;  Location:  OR     TRANSPLANT HEART RECIPIENT  N/A 05/05/2021    Procedure: Redo median sternotomy, lysis of adhesions, heart transplant recipient, on cardiopulmonary bypass, intraoperative transesophageal echocardiogram per anesthesia, Implantable Cardioverter Defibrillator (ICD) removal;  Surgeon: Ronnie Quigley MD;  Location: [facility] OR     acetaminophen (TYLENOL) 325 MG tablet  albuterol (PROAIR HFA/PROVENTIL HFA/VENTOLIN HFA) 108 (90 Base) MCG/ACT inhaler  allopurinol (ZYLOPRIM) 300 MG tablet  aspirin (ASA) 81 MG chewable tablet  bisacodyl (DULCOLAX) 5 MG EC tablet  bisacodyl (DULCOLAX) 5 MG EC tablet  buPROPion (WELLBUTRIN XL) 300 MG 24 hr tablet  cyanocobalamin (VITAMIN B-12) 1000 MCG tablet  diphenhydrAMINE (BENADRYL) 25 MG tablet  gabapentin (NEURONTIN) 300 MG capsule  ipratropium - albuterol 0.5 mg/2.5 mg/3 mL (DUONEB) 0.5-2.5 (3) MG/3ML neb solution  Melatonin 10 MG CAPS  methocarbamol (ROBAXIN) 500 MG tablet  montelukast (SINGULAIR) 10 MG tablet  mycophenolate (GENERIC EQUIVALENT) 250 MG capsule  polyethylene glycol (GOLYTELY) 236 g suspension  polyethylene glycol (GOLYTELY) 236 g suspension  polyethylene glycol (MIRALAX) 17 GM/Dose powder  rosuvastatin (CRESTOR) 10 MG tablet  tacrolimus (GENERIC EQUIVALENT) 0.5 MG capsule  tacrolimus (GENERIC EQUIVALENT) 1 MG capsule  tamsulosin (FLOMAX) 0.4 MG capsule  traMADol (ULTRAM) 50 MG tablet      Allergies   Allergen Reactions     Grass Shortness Of Breath     Ace Inhibitors Cough     Cats      Dust Mites Other (See Comments)     Asthma     Mold Other (See Comments)     Asthma     Penicillins Other (See Comments)     Unknown - childhood exposure    Tolerated Zosyn 9/18-9/20/2020    Per patient report he has tolerated amoxicillin     Sulfa Antibiotics Other (See Comments) and Unknown     Unknown childhood reaction     Family History  Family History   Problem Relation Age of Onset     Cerebrovascular Disease Mother 64     Diabetes Mother      Hypertension Mother      Coronary Artery Disease Father      Diabetes  Type 2  Father      Obesity Brother      Obesity Brother      Cerebrovascular Disease Daughter 40     Social History   Social History     Tobacco Use     Smoking status: Former     Current packs/day: 0.00     Types: Cigarettes     Quit date:      Years since quittin.4     Passive exposure: Never     Smokeless tobacco: Never   Vaping Use     Vaping status: Never Used   Substance Use Topics     Alcohol use: No     Drug use: No         A medically appropriate review of systems was performed with pertinent positives and negatives noted in the HPI, and all other systems negative.    Physical Exam   BP: 124/88  Pulse: 94  Temp: 97.5  F (36.4  C)  Resp: 20  SpO2: 100 %  Physical Exam  General: patient is alert and oriented and in no acute distress   Head: atraumatic and normocephalic   EENT: moist mucus membranes without tonsillar erythema or exudates, pupils round and reactive   Neck: supple   Cardiovascular: regular rate and rhythm, extremities warm and well perfused, trace right lower extremity edema  Pulmonary: diminished breath sounds bilaterally, no wheezing noted  Abdomen: soft, non-tender   Musculoskeletal: normal range of motion   Neurological: alert and oriented, moving all extremities symmetrically, gait normal   Skin: warm, dry       ED Course, Procedures, & Data      Procedures            EKG Interpretation:      Interpreted by Megan Wood MD  Time reviewed: 1351  Symptoms at time of EKG: none   Rhythm: normal sinus   Rate: Normal  Axis: Right Axis Deviation  Ectopy: none  Conduction: right bundle branch block (incomplete)  ST Segments/ T Waves: No acute ischemic changes  Q Waves: none  Comparison to prior: Unchanged    Clinical Impression: right bundle branch block                    No results found for any visits on 24.  Medications - No data to display  Labs Ordered and Resulted from Time of ED Arrival to Time of ED Departure - No data to display  No orders to display           Critical care was not performed.     Medical Decision Making  The patient's presentation was of high complexity (an acute health issue posing potential threat to life or bodily function).    The patient's evaluation involved:  ordering and/or review of 3+ test(s) in this encounter (see separate area of note for details)  discussion of management or test interpretation with another health professional (Cardiology)    The patient's management necessitated high risk (a decision regarding hospitalization).      Assessment & Plan     68-year-old male with a history of cardiomyopathy status post heart transplant in 2021 who presents to the emergency department with increasing exertional dyspnea.  Differential diagnosis includes but is not limited to heart failure exacerbation, rejection, ACS, PE, anemia, viral URI, pneumonia.  His ECG shows no acute ischemic changes.  Chest x-ray shows trace bilateral pleural effusions.  Laboratory evaluation shows a significantly elevated BNP of over 18,000, initial troponin of 116.  Additionally he has acute kidney injury with an elevation in his creatinine to 3.01 up from 1.5 a few weeks ago.  His D-dimer is also elevated at greater than 2.  Echocardiogram and VQ scan ordered and pending.  Discussed with cardiology and will plan to admit for further workup..    I have reviewed the nursing notes. I have reviewed the findings, diagnosis, plan and need for follow up with the patient.    New Prescriptions    No medications on file       Final diagnoses:   Acute kidney injury (H24)   New onset of congestive heart failure (H)   Elevated troponin   Heart replaced by transplant (H)     This part of the medical record was transcribed by April Ram, Medical Scribe, from a dictation done by Megan Wood MD.     Megan Wood MD  [facility] EMERGENCY DEPARTMENT  5/20/2024     Megan Wood MD  05/20/24 8425

## 2024-05-20 NOTE — ED TRIAGE NOTES
Arrives with SOB with activity and headache. Per patient it started a week ago.     Hx heart transplant 2021     Triage Assessment (Adult)       Row Name 05/20/24 1311          Triage Assessment    Airway WDL WDL        Respiratory WDL    Respiratory WDL X;rhythm/pattern     Rhythm/Pattern, Respiratory dyspnea upon exertion        Skin Circulation/Temperature WDL    Skin Circulation/Temperature WDL WDL        Cardiac WDL    Cardiac WDL WDL        Peripheral/Neurovascular WDL    Peripheral Neurovascular WDL WDL        Cognitive/Neuro/Behavioral WDL    Cognitive/Neuro/Behavioral WDL WDL

## 2024-05-20 NOTE — MEDICATION SCRIBE - ADMISSION MEDICATION HISTORY
Medication Scribe Admission Medication History    Admission medication history is complete. The information provided in this note is only as accurate as the sources available at the time of the update.    Information Source(s): Patient and CareEverywhere/SureScripts via in-person    Pertinent Information:   -Pt stated that he ran out of his Vitamin B-12     Changes made to PTA medication list:  Added: Vitamin D3  Deleted: Tamsulosin 0.4 mg, Golytely 236 g (x2), Methocarbamol 500 mg, Dulcolax 5 mg (x2)  Changed: Tramadol 50 mg (TID) --> Tramadol 50 mg (PRN), Tacrolimus 0.5 mg (on Hold) --> Tacrolimus 0.5 mg (Qam), Miralax (every day) --> Miralax (PRN), Melatonin 10 mg (HS) --> Melatonin 10 mg (HS PRN), DUONEB (4 times daily) --> DUONEB (4 times daily PRN), Gabapentin 300 mg (BID) --> Gabapentin 300 mg (every day),     Allergies reviewed with patient and updates made in EHR: yes    Medication History Completed By: Bettina Hernandez 5/20/2024 6:23 PM    Addendum 5/23/2024 955AM:  Called [facility] ([phone]) to verify tacrolimus 0.5mg and 1mg capsule /NDC.  Tacrolimus 1mg capsule: [place] [id number]; [facility]; AB rated to [employer]  Tacrolimus 0.5mg capsule: [place] [id number]; [facility]; AB rated to [employer]    Shanon Mays, Pharm.D., [facility], [facility]  Pager [phone]    PTA Med List   Medication Sig Last Dose    acetaminophen (TYLENOL) 325 MG tablet Take 3 tablets (975 mg) by mouth every 8 hours as needed for mild pain 5/19/2024 at unknown    albuterol (PROAIR HFA/PROVENTIL HFA/VENTOLIN HFA) 108 (90 Base) MCG/ACT inhaler Inhale 2 puffs into the lungs every 4 hours as needed for shortness of breath, wheezing or cough 5/20/2024 at am    allopurinol (ZYLOPRIM) 300 MG tablet TAKE 1 TABLET BY MOUTH DAILY 5/20/2024 at am    aspirin (ASA) 81 MG chewable tablet Take 1 tablet (81 mg) by mouth daily 5/20/2024 at am    buPROPion (WELLBUTRIN XL) 300 MG 24 hr tablet Take 1 tablet  (300 mg) by mouth every morning 5/20/2024 at am    cyanocobalamin (VITAMIN B-12) 1000 MCG tablet Take 1,000 mcg by mouth daily Unknown at unknown    diphenhydrAMINE (BENADRYL) 25 MG tablet Take 25 mg by mouth every 6 hours as needed for itching Past Week at unknown    gabapentin (NEURONTIN) 300 MG capsule Take 300 mg by mouth daily 5/20/2024 at am    ipratropium - albuterol 0.5 mg/2.5 mg/3 mL (DUONEB) 0.5-2.5 (3) MG/3ML neb solution Take 1 vial by nebulization 4 times daily as needed for shortness of breath, wheezing or cough Unknown at unknown    Melatonin 10 MG CAPS Take 1 capsule by mouth nightly as needed Past Week at unknown    montelukast (SINGULAIR) 10 MG tablet TAKE 1 TABLET BY MOUTH AT  BEDTIME 5/19/2024 at pm    mycophenolate (GENERIC EQUIVALENT) 250 MG capsule TAKE one CAPSULE BY MOUTH TWICE A DAY 5/20/2024 at am    polyethylene glycol (MIRALAX) 17 GM/Dose powder Take 1 Capful by mouth daily as needed for constipation Unknown at unknown    rosuvastatin (CRESTOR) 10 MG tablet TAKE ONE TABLET BY MOUTH EVERY DAY 5/20/2024 at am    tacrolimus (GENERIC EQUIVALENT) 0.5 MG capsule Take 0.5 mg by mouth every morning With 3 mg capsules 5/20/2024 at am    tacrolimus (GENERIC EQUIVALENT) 1 MG capsule TAKE 3 CAPSULES BY MOUTh IN am and in pm. 5/20/2024 at am    traMADol (ULTRAM) 50 MG tablet Take 50 mg by mouth every 6 hours as needed for severe pain Unknown at unknown    VITAMIN D PO Take 1 capsule by mouth daily 5/20/2024 at am

## 2024-05-21 NOTE — PROGRESS NOTES
[facility]    Cardiology Progress Note- Cardiology        Date of Admission:  5/20/2024     Assessment & Plan: HVSL   Jim Willingham is a 67yo man with a history of NICM s/p HM2 LVAD in 6/2017 now s/p OHT 5/2021. He is being admitted on 5/20 for progressive dyspnea and volume overload.     Changes today  -Lasix 40mg x1  -Hold off on RHC and cardiac biopsy,Presentation and cardiac biomarker elevated could be explained by PE  -Heparin gtt started  -Pharmacy Liason for DOAC  -Tacrolevels,Immuknow pending    # Pulmonary Embolism, low risk  # Elevated D-Dimer 2.11  Started to notice symptoms ~10 days prior to admission. Primary symptom of concern is dyspnea and fatigue. Ordinarily has no exertional limits, now cannot ambulate across the room without dyspnea. Elevated D dimers on presentation ,elevated NT bro BNP-18,909, V/Q scan with findings of wedge shaped defects in basilar segments of lower right lobe concerning for pulmonary embolism as per PISAPED criteria.ECHO without RV Dysfunction. Denies prior hx of PE, DVT. No imaging findings of DVT.   -Heparin gtt started  -Will need 3 months of anticoagulation.Pharmacy consulted for medication coverage  -Recommend outpatient cancer screening    # Volume Overload   noticed concurrent ~15 pound weight gain (baseline weight 187) in addition to abdominal bloating and intermittent pleuritic chest pain.   Concerning for graft dysfunction. Echo with large IVC,non collapsible and anasarca on CT.-Will defer RHC and cardiac biopsy as less suspicin for rejection at this time given diagnosis of PE.  -IV lasix 40 mg x1  - AlloMap  - DSA  - Immuknow-pending  - Tacro level-pending     # Status post OHT on 5/2021, history of NICM  # Chronic immunosuppression  NT bro BNP-18,909  * TTE Jan 2024 w/ normal LVEF. Borderline reduced RV function with mild dilation   * RHC May 2023 w/ RA 8, PA 41/19 (26), wedge 15 and cardiac index 3.2; biopsy  cellular grade 0R and pAMR 0  * Cors May 2023 w/ minimal nonobstructive CAD (20% pRCA)  NT bro BNP-18,909  Rejection History:  Rejection history:  none  AlloMap scores:  < 35 recently  DSAs:  none     Immunosuppression:   --Cellcept 500 mg BID Q12 hours   --tacrolimus 3.5mg  AM / 3mg PM; goal 4-6     Serostatus: CMV: D+/R+, EBV: D+/R+, Toxo: D-/R-     Prophylaxis:   --CAV: aspirin 81 mg and rosuvastatin 10 mg daily   --Osteoporosis: calcium/vitamin D 500mg BID  --GI: Pantoprazole 40mg daily  --EBV: 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 On admission here ~3. Suspect possibly cardiorenal though will rule out a component of post-renal with PVR. No protein in urine to suggest glomerular process.   - strict I/Os  - UA  - Bladder scan  - renally dose meds, avoid nephrotoxic meds     # Normocytic Anemia  Baseline ~11, at baseline here. Suspect anemia of renal disease  - trend daily     # Elevated Troponin  116->112, suspect troponin leak in setting of PE and kidney injury  - Trend to peak      #Medication induced antiplatelet effect - on pta aspirin  #Immunocompromised Host - on tacrolimus and mmf  #Prediabetes - A1c 5.8%        Diet: Fluid restriction 2000 ML FLUID  NPO per Anesthesia Guidelines for Procedure/Surgery Except for: Meds    DVT Prophylaxis: Heparin SQ  Russell Catheter: Not present  Cardiac Monitoring: ACTIVE order. Indication: Acute decompensated heart failure (48 hours)  Code Status: Full Code          Clinically Significant Risk Factors Present on Admission          # Hypocalcemia: Lowest Ca = 8.4 mg/dL in last 2 days, will monitor and replace as appropriate      # Coagulation Defect: INR = 1.49 (Ref range: 0.85 - 1.15) and/or PTT = N/A, will monitor for bleeding  # Drug Induced Platelet Defect: home medication list includes an antiplatelet medication  # Acute Kidney Injury, unspecified: based on a >150%  or 0.3 mg/dL increase in last creatinine compared to past 90 day average, will monitor renal function  # Hypertension: Noted on problem list  # Acute heart failure with preserved ejection fraction: heart failure noted on problem list, last echo with EF >50%, and receiving IV diuretics         # Financial/Environmental Concerns:    # Asthma: noted on problem list        Disposition Plan   Expected discharge: 4 - 7 days, recommended to prior living arrangement once fluid volume status optimized on oral medication.    Entered: Renata Cespedes MD 05/21/2024, 7:55 AM   The patient's care was discussed with the Attending Physician, Dr. Montaño .      Renata Cespedes MD   Resident PGY-1  [facility]  ______________________________________________________________________    Interval History   Nursing Notes reviewed,Reports feeling better this AM, is less short of breath ,Denies fever,chills , abdominal pain or new concerns.      Physical Exam   Vital Signs: Temp: 98.8  F (37.1  C) Temp src: Oral BP: 110/78 Pulse: 99   Resp: 18 SpO2: 100 % O2 Device: None (Room air)    Weight: 197 lbs 12.8 oz    Exam:  General: NAD   HEENT: no scleral icterus or injection  CARDIAC: RRR, no murmurs. No JVD  RESP:  CTAB  GI: Distended  : No christopher  EXTREMITIES: no LE edema  SKIN: No acute lesions appreciated  NEURO: No focal deficits    Medical Decision Making       Please see A&P for additional details of medical decision making.      Data     I have personally reviewed the following data over the past 24 hrs:    7.5  \   10.7 (L)   / 238     137 103 84.5 (H) /  85   4.1 19 (L) 2.92 (H) \     ALT: 38 AST: 34 AP: 110 TBILI: 0.5   ALB: 3.7 TOT PROTEIN: 6.1 (L) LIPASE: N/A     Trop: 112 (HH) BNP: 18,909 (H)     INR:  1.49 (H) PTT:  N/A   D-dimer:  2.11 (H) Fibrinogen:  N/A         NM LUNG SCAN PERFUSION PARTICULATE   Planar perfusion images demonstrate wedge-shaped defects involving the  basilar  segments of the right lower lobe and the majority of the right  middle lobe.     On fusion CT images, there is reduced perfusion to the lingular  segments with reduced perfusion in the anterior segment of the right  upper lobe, medial greater than lateral segments of the right middle  lobe, medial and posterior basilar segments of the right lower lobe.     Limited CT imaging demonstrates the small left pleural effusion.  Cardiomegaly. Orthotopic heart transplant. In the upper abdomen,  cholelithiasis and partially imaged ascites and gastric bypass  changes. Anasarca.    Impression:  1. Segmental appearing perfusion defects involving the anterior right  upper lobe, right middle/basilar and lingular segments , pulmonary  embolism not excluded.   2. Limited CT demonstrates cardiomegaly with small pericardial  effusion.  3. Partially imaged ascites.  4. Cholelithiasis.  5. Anasarca.

## 2024-05-21 NOTE — PROGRESS NOTES
Admission    Diagnosis: Fluid volume excess  Admitted from: ED  Via: Cart  Accompanied by: No one  Belongings: Kept with pt, declined sending any items to security.  Admission Profile: Complete.  Teaching: orientation to unit, call don't fall, use of console, meal times, visiting hours, when to call for the RN (angina/sob/dizziness, etc.), and enforced importance of safety.   Access: PIV  2 RN skin check completed.   Telemetry: Placed on patient.  Height/Weight: Complete

## 2024-05-21 NOTE — PROGRESS NOTES
05/21/24 1400   Appointment Info   Signing Clinician's Name / Credentials (OT) Maryellen Nieto OTR/L   Rehab Comments (OT) OT CR  OHT 2021   Living Environment   People in Home grandchild(lidia);spouse  (11yo grandaujulieth)   Current Living Arrangements house   Home Accessibility stairs within home   Number of Stairs, Within Home, Primary seven   Stair Railings, Within Home, Primary railings safe and in good condition   Transportation Anticipated car, drives self   Living Environment Comments Split level home 6 down 7 up   Self-Care   Usual Activity Tolerance moderate   Current Activity Tolerance fair   Equipment Currently Used at Home none;grab bar, tub/shower  (grab bar, tub/shower  (has cane, walker, and reacher if needed))   Fall history within last six months no   Activity/Exercise/Self-Care Comment Limited activity tolerance at admit   Instrumental Activities of Daily Living (IADL)   Previous Responsibilities meal prep;housekeeping;laundry;shopping;yardwork;medication management;finances;driving;   IADL Comments shares with wife   General Information   Onset of Illness/Injury or Date of Surgery 05/20/24   Referring Physician Dr Montaño   Patient/Family Therapy Goal Statement (OT) be IND   Additional Occupational Profile Info/Pertinent History of Current Problem 67yo man with a history of NICM s/p HM2 LVAD in 6/2017 now s/p OHT 5/2021. He is being admitted on 5/20 for progressive dyspnea and volume overload.   General Observations and Info activity ambulate   Cognitive Status Examination   Orientation Status orientation to person, place and time   Cognitive Status Comments reports mild cognitive deficit at baseline   Visual Perception   Visual Impairment/Limitations corrective lenses for reading   Sensory   Sensory Comments neuropathy in B feet, numbness in B hands due to CTS   Pain Assessment   Patient Currently in Pain No   Posture   Posture not impaired   Range of Motion Comprehensive   Comment,  General Range of Motion WFL   Strength Comprehensive (MMT)   Comment, General Manual Muscle Testing (MMT) Assessment generalized deconditioning   Coordination   Upper Extremity Coordination No deficits were identified   Bed Mobility   Comment (Bed Mobility) IND   Transfers   Transfer Comments unable to tolerate due to RESENDIZ although VSS, pt expressing concerns about possible new dx   Balance   Balance Comments unable to tolerate due to RESENDIZ although VSS, pt expressing concerns about possible new dx   Activities of Daily Living   Comment, BADL Assessment/Training unable to tolerate due to RESENDIZ although VSS, pt expressing concerns about possible new dx.  Modified EOB ADLs Mod I.   Comment, IADL Assessment/Training unable to tolerate standing tasks due to RESENDIZ although VSS, pt expressing concerns about possible new dx   Additional Documentation Comment, BADL Assessment/Training (Row);Comment, IADL Assessment/Training (Row)   Clinical Impression   OT Diagnosis poor activity tolerance   OT Problem List-Impairments impacting ADL problems related to;activity tolerance impaired   Assessment of Occupational Performance 1-3 Performance Deficits   Identified Performance Deficits home management, standing ADLs and IADLs   Planned Therapy Interventions (OT) progressive activity/exercise;home program guidelines   Clinical Decision Making Complexity (OT) problem focused assessment/low complexity   Risk & Benefits of therapy have been explained patient;participants included;participants voiced agreement with care plan;current/potential barriers reviewed;risks/benefits reviewed;care plan/treatment goals reviewed;evaluation/treatment results reviewed   OT Total Evaluation Time   OT Eval, Low Complexity Minutes (22611) 3   OT Discharge Planning   OT Discharge Recommendation (DC Rec) home with assist

## 2024-05-21 NOTE — PROGRESS NOTES
Shift: 0700 - 1900    Neuro: A&Ox4, calm and cooperative.   Cardiac: SR. VSS.   Respiratory: Sating >95% on RA.  GI/: Adequate urine output. BM X1 this shift.  Diet/appetite: Tolerating regular diet. Eating well.  Activity: SBA when up to chair and in halls.  Pain: Reported of HA and back pain, PRN Tylenol and Tramadol given with relief.  Skin: No new deficits noted.  LDA's: PIV X1 R forearm.    Shift update: Pt did not get RHC today r/t high probability PE hence continues with heparin drip for now. Reported of SOB x2 and PRN albuterol given with relief.     Plan: Continue with POC. Notify primary team Cards 2 with changes.

## 2024-05-21 NOTE — PROVIDER NOTIFICATION
ED 06 Jim Willinghma: Pt requesting Melatonin 10 mg PO PRN for sleep. It is in his home med list. Ismael Castillo RN

## 2024-05-21 NOTE — CONSULTS
Discharge Pharmacy Test Claim    Patient's [employer] Part D plan covers Eliquis and Xarelto. Expected monthly copays are listed below.    Test Claim Copay   Eliquis 151.98   Xarelto 145.65     [facility] has one-time use 30-day free trial vouchers available for eliquis or xarelto.     Faye James  [facility] Pharmacy Liaison  Phone: [phone] Fax: [phone]  Available on Teams & Vocbere

## 2024-05-21 NOTE — PLAN OF CARE
Status:  H/O  NICM s/p HM2 LVAD in 6/2017 now s/p OHT 5/2021. He is being admitted on 5/20 for progressive dyspnea and volume overload.     Neuro: A/Ox4.  Cardiac: SR with HR 70-80-s. VSS.   Respiratory: O2 sats stable on RA, LS diminished in bases  GI/: BM yesterday, voiding in urinal  Diet/appetite: NPO after MN for RHC  Activity:  Up with SBA  Pain: Denies pain   Skin: Intact, no new deficits noted.  LDA's: PIV - Heparin gtt at 1600 units/hr, 10a due at 0730    Plan: RHC with bx today. AM tacro level. Continue with POC. Notify primary team with changes.

## 2024-05-21 NOTE — PROGRESS NOTES
"CLINICAL NUTRITION SERVICES    Reason for Assessment:  Low-sodium (2 g/day) nutrition education, received consult    Diet History:  Per RD 9/14/20, \"Provided nutrition education on low sodium diet, wt loss goal, and post-op Tx nutrition education.\"     Per discussion with pt on 5/21, reports receiving low-sodium nutrition education in the past.    Nutrition Diagnosis:  No nutrition diagnosis    Nutrition Prescription/Recs:  Continue low-sodium diet (resume once post-procedure), fluid restriction.      Interventions:  Nutrition Education: Offered verbal/written instruction on low-sodium meal planning. Pt politely declined as pt has received nutrition education on low-sodium diet in the past. Gave sodium room service menu.     Goals:    Pt will verbalize at least five high sodium foods and the importance of avoiding added salt to foods for cooking or seasoning foods.     Follow-up:   Patient to ask any further nutrition-related questions before discharge. In addition, pt may request outpatient RD appointment.     Sana Costello, MS, RD, LD, CNSC      No longer available via pager  6C (beds 0099-1326 and [phone]): Vocera \"6C Clinical Dietitian\"   Weekend/Holiday: Vocera \"Weekend Clinical Dietitian\"   "

## 2024-05-22 NOTE — PLAN OF CARE
Neuro: A/Ox4.  Cardiac: SR with HR 80-90's. VSS.   Respiratory: O2 sats stable on %, albuterol inhaler prn SOB  GI/: Voiding in urinal, BM yesterday  Diet/appetite: Regular diet, 2L FR   Activity:  UAL  Pain: Tylenol for HA   Skin: Intact, no new deficits noted.  LDA's: PIV- Heparin gtt at 1600 units/hr. Awaiting AM 10a.     Plan: Continue with POC. Notify primary team with changes.

## 2024-05-22 NOTE — PLAN OF CARE
Goal Outcome Evaluation:      Plan of Care Reviewed With: patient    Overall Patient Progress: no changeOverall Patient Progress: no change    Outcome Evaluation: Patient independent with no in-home supports or home care in place PTA. Discharge needs unclear, but anticipate no need for in-home supports at discharge.

## 2024-05-22 NOTE — PLAN OF CARE
Goal Outcome Evaluation:      Plan of Care Reviewed With: patient    Overall Patient Progress: improvingOverall Patient Progress: improving    Outcome Evaluation: Chest CT and renal US complete, expected to have right heart cath and biopsy tomorrow

## 2024-05-22 NOTE — PROGRESS NOTES
[facility]   Cardiology II Service / Advanced Heart Failure  Daily Progress Note      Patient: Jim Willingham  MRN: [id number]  Admission Date: 5/20/2024  Hospital Day # 2    Assessment and Plan: Mr. Jim Willingham is a 68yr old male with a history of NICM (s/p HM2 LVAD 6/2017) s/p OHT 5/6/21, VT, AFib, CKD, DMII, and COPD who presents for RESENDIZ and was found to have a PE.     His post-transplant course was c/b right pleural air leak (required prolonged chest tube), pAFib, CAP (RLL, 6/2021), recurrent pleural effusions (s/p thoracenteses x2 6/2021 and 7/2021, exudative, repeatedly cultured negative), RLL cavitary lesions (per chest CT 7/14/21, BD glucan indeterminate, aspergillus negative, not started on antifungals), pericardial effusion (1.4cm per TTE 7/12/21, conservatively managed), low-grade EBV viremia, recurrent/persistent gout flare, transaminase elevation with questionable choledocholithiasis (conservatively managed with resolution), COVID-19 (8/2021, s/p monoclonal antibodies and remdesivir x5 days), and 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       Today's Plan:  - Lasix 40 mg IV x1  - CT Chest/abd/pelvis non-contrast to search for occult malignancy  - RHC and biopsy tomorrow AM (needs to be pre-noon to rush path)  - Hold heparin gtt at 4 am on 5/23/24 (risk benefits considered d/t PE, but need for biopsy outweighs this)  - Renal ultrasound  - If Cr not improving tomorrow, may need to get renal on board  - Urine urea and urine creatinine to calculate Feurea  - F/up Immunknow pending from 5/21/24  - F/up Allomap    # Status post OHT on 5/2021, history of NICM  # Chronic immunosuppression  NT bro BNP-18,909  * TTE May 20, 2024 ECHO, LVEF of 55-60%, RV ventricular function is normal, trace to mild TR, IVC >2.1 cm and collapsing <50%, no effusion. No significant changes noted from prior   * RHC May 2023 w/ RA 8, PA 41/19 (26), wedge 15 and cardiac index 3.2; biopsy  cellular grade 0R and pAMR 0  * Cors May 2023 w/ minimal nonobstructive CAD (20% pRCA)  NT bro BNP-18,909  Rejection history:  none  AlloMap scores:  < 35 recently, allomap this admission pending  DSAs:  NEW DSA noted this admission     Immunosuppression:   --Continue PTA Cellcept 250 mg BID Q12 hours   --tacrolimus 3.5mg  AM / 3mg PM; goal 4-6, level is 4.4 on 5/22, 11 hour trough    Graft:  Volume status: Hypervolemic, not on PTA diuretics- planning for another 40 mg IV lasix today  Bps: At goal, not on antihypertensives  HR: At baseline in the 90s     Serostatus: CMV: D+/R+, EBV: D+/R+, Toxo: D-/R-     Prophylaxis:   --CAV: aspirin 81 mg and rosuvastatin 10 mg daily   --Osteoporosis: calcium/vitamin D 500mg BID  --GI: Pantoprazole 40mg daily  --EBV: 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 Primary symptom of concern is dyspnea and fatigue. Ordinarily has no exertional limits, now cannot ambulate across the room without dyspnea. Elevated D dimers on presentation ,elevated NT bro BNP-18,909, V/Q scan with findings of wedge shaped defects in basilar segments of lower right lobe concerning for pulmonary embolism as per PISAPED criteria.ECHO without RV Dysfunction. Denies prior hx of PE, DVT. No imaging findings of DVT.   -Heparin gtt started  -Will need 3 months of anticoagulation.Pharmacy consulted for medication coverage. Eliquis and Xarelto with high co-pays so will need ocumadin  -Recommend outpatient cancer screening, but will get CT Chest/Abd/pelvis inpatient as well, non-contrast d/t renal function.  - Per Dr. Montaño no need to to consider more invasive intervention on the PE.    # Headache. Has been having intermittent headaches over the last 10 days. No neuro features, but it is not resolving and it is becoming more persistent.  Tac level is 4.4 so less likely that.  - Will consider brain mri if not resolving    # WALTER on CKD  Baseline Scr ~1.4. On admission here ~3. Suspect possibly cardiorenal though will rule out a component of post-renal with PVR. No protein in urine to suggest glomerular process.   - strict I/Os  - FeUrea testing pending  - Renal ultarsound  - Diruesis as above   If not improving tomorrow will need to consider renal consult  - renally dose meds, avoid nephrotoxic meds     # Normocytic Anemia  Baseline ~11, at baseline here. Suspect anemia of renal disease  - trend daily     # Elevated Troponin  116->112, suspect troponin leak in setting of PE and kidney injury. However, given RV not showing any dysfunction hard to say if it is proportion or not to his PE severity.   - Endomyocardioal biopsy tomorrow to assess for rejection, defering emperic treatment given preserved ef      #Medication induced antiplatelet effect - on pta aspirin  #Immunocompromised Host - on tacrolimus and mmf  #Prediabetes - A1c 5.8%      Diet: Fluid restriction 2000 ML FLUID  NPO per Anesthesia Guidelines for Procedure/Surgery Except for: Meds    DVT Prophylaxis: Heparin SQ  Russell Catheter: Not present  Cardiac Monitoring: ACTIVE order. Indication: Acute decompensated heart failure (48 hours)  Code Status: Full Code        Didi Butler PA-C  Advanced Heart Failure/Cardiology II Service  [place] preferred, or pager [phone]      Patient discussed with Dr. Montaño.        75 minutes spent on the date of the encounter doing chart review, history and exam, documentation and further activities per the note    ================================================================    Subjective/24-Hr Events:   Last 24 hr care team notes reviewed. Feeling okay today. He isn't sure if he is having fluid. HE contineus to have a headache. No neuro symptoms. Tylenol helping only minimally. He does have a history of intermittent headaches but this is becoming  more persistent. No nausea or vomitting. Still having RESENDIZ. All over body weakness. No fevers or chills. No cough. No hemoptysis. No palpitations.     ROS:  4 point ROS including respiratory, CV, GI and  (other than that noted in the HPI) is negative.     Medications: Reviewed in EPIC.     Physical Exam:   /77 (BP Location: Left arm)   Pulse 94   Temp 98.5  F (36.9  C) (Oral)   Resp 18   Wt 89.4 kg (197 lb 1.6 oz)   SpO2 98%   BMI 29.97 kg/m      GENERAL: Appears comfortable, in no distress .  HEENT: Eye symmetrical, no discharge or icterus bilaterally.   NECK: Supple, JVD difficult to assess.   CV: RRR, +S1S2, no murmur, rub, or gallop.   RESPIRATORY: Respirations regular, even, and unlabored. Lungs CTA throughout.    GI: Soft and non distended   EXTREMITIES: No peripheral edema. All extremities are warm and well perfused  NEUROLOGIC: Alert and interacting appropriately. No focal deficits.   SKIN: No jaundice. No rashes or lesions.     Labs:  CMP  Recent Labs   Lab 05/22/24  0525 05/21/24  0535 05/20/24  1403    137 135   POTASSIUM 4.2 4.1 5.1   CHLORIDE 103 103 101   CO2 19* 19* 19*   ANIONGAP 13 15 15   * 85 124*   BUN 80.1* 84.5* 84.9*   CR 2.87* 2.92* 3.04*   GFRESTIMATED 23* 23* 22*   QAMAR 8.6* 8.4* 8.8   MAG 2.1 2.0 2.3   PROTTOTAL  --  6.1* 7.1   ALBUMIN  --  3.7 4.4   BILITOTAL  --  0.5 0.6   ALKPHOS  --  110 132   AST  --  34 48*   ALT  --  38 49       CBC  Recent Labs   Lab 05/22/24  0525 05/21/24  0535 05/20/24  1733 05/20/24  1403   WBC 5.7 7.5 6.3 6.2   RBC 3.49* 3.61* 3.98* 3.83*   HGB 10.3* 10.7* 11.9* 11.4*   HCT 32.6* 34.0* 37.9* 35.8*   MCV 93 94 95 94   MCH 29.5 29.6 29.9 29.8   MCHC 31.6 31.5 31.4* 31.8   RDW 13.9 14.0 13.9 14.0    238 270 240       INR  Recent Labs   Lab 05/22/24  0525 05/21/24  0535   INR 1.36* 1.49*

## 2024-05-22 NOTE — PLAN OF CARE
Shift: [id number]      I/A:  Neuro: A&O x4. Reports constant headache. Calm & cooperative. Independent.  Cardiac: Tele in place, SR/ST w/ 1st AVB. HR 's. Afebrile. Denies chest pain.  Resp: VSS on RA. Does report RESENDIZ, albuterol inhaler given x1. Lungs diminished. No cough present.  GI/: Voiding spontaneously. LBM 5/21. Regular diet, 2L FR. Good appetite.  Skin: No new deficits noted  LDA's: PIV in place infusing Heparin at 1600 units/hr.   Pain: 4/10 headache pain, PRN tylenol given with little relief. If tylenol does not work, PRN Tramadol placed.       - Ultrasound of kidney  - RHC & biopsy planned for tomorrow Thursday 5/23 (Needs to be pre-noon to rush path)   - Turn off heparin gtt @ 0400 on 5/23     Plan: Continue to monitor and follow POC. Notify Cards 2 with any changes.

## 2024-05-22 NOTE — CONSULTS
Care Management Initial Consult    General Information  Assessment completed with: Patient,    Type of CM/SW Visit: Initial Assessment-Writer familiar with Jim from transplant program. Received heart transplant 5/6/2021    Primary Care Provider verified and updated as needed: Yes-Dr. Gómez listed on Face Sheet associated with [facility] clinic   Readmission within the last 30 days: no previous admission in last 30 days         Advance Care Planning: Advance Care Planning Reviewed: present on chart          Communication Assessment  Patient's communication style: spoken language (English or Bilingual)    Hearing Difficulty or Deaf: no   Wear Glasses or Blind: yes    Cognitive  Cognitive/Neuro/Behavioral: WDL                      Living Environment:   People in home: Wife-second marriage and 10 year old Great Grand Dtr-Has custody of     Current living Arrangements: Split-level home with steps. No functional limitations using the stairs      Able to return to prior arrangements: yes       Family/Social Support:  Care provided by: self  Provides care for: grandchild(lidia)  Marital Status:   Wife, Children  Cate       Description of Support System: Supportive, Involved    Support Assessment: Adequate family and caregiver support, Adequate social supports. Patient has a 95 y/o Father living close by and 2 siblings, along with a biological son and Dtr, several step-sons, 16 grandchildren, and 7 great-grandchildren.Lut    Current Resources:   Patient receiving home care services: No     Community Resources: None  Equipment currently used at home: grab bar, toilet, grab bar, tub/shower  Supplies currently used at home: None    Employment/Financial:  Employment Status: disabled        Financial Concerns: none   Referral to Financial Worker: No       Does the patient's insurance plan have a 3 day qualifying hospital stay waiver?  No    Lifestyle & Psychosocial Needs:  Social Determinants of Health     Food  Insecurity: Low Risk  (9/26/2023)    Food Insecurity     Within the past 12 months, did you worry that your food would run out before you got money to buy more?: No     Within the past 12 months, did the food you bought just not last and you didn t have money to get more?: No   Depression: Not at risk (3/21/2024)    PHQ-2     PHQ-2 Score: 1   Housing Stability: Low Risk  (9/26/2023)    Housing Stability     Do you have housing? : Yes     Are you worried about losing your housing?: No   Tobacco Use: Medium Risk (4/18/2024)    Patient History     Smoking Tobacco Use: Former     Smokeless Tobacco Use: Never     Passive Exposure: Never   Financial Resource Strain: High Risk (9/26/2023)    Financial Resource Strain     Within the past 12 months, have you or your family members you live with been unable to get utilities (heat, electricity) when it was really needed?: Yes   Alcohol Use: Not on file   Transportation Needs: Low Risk  (9/26/2023)    Transportation Needs     Within the past 12 months, has lack of transportation kept you from medical appointments, getting your medicines, non-medical meetings or appointments, work, or from getting things that you need?: No   Physical Activity: Not on file   Interpersonal Safety: Low Risk  (9/26/2023)    Interpersonal Safety     Do you feel physically and emotionally safe where you currently live?: Yes     Within the past 12 months, have you been hit, slapped, kicked or otherwise physically hurt by someone?: No     Within the past 12 months, have you been humiliated or emotionally abused in other ways by your partner or ex-partner?: No   Stress: Not on file   Social Connections: Not on file   Health Literacy: Not on file       Functional Status:  Prior to admission patient needed assistance:   Dependent ADLs:: Independent  Dependent IADLs:: Independent       Mental Health Status:  Mental Health Status: No Current Concerns       Chemical Dependency Status:  Chemical Dependency  Status: No Current Concerns             Values/Beliefs:  Spiritual, Cultural Beliefs, Orthodox Practices, Values that affect care: yes               Additional Information:  Jim reports symptoms of fatigue and shortness of breath prior to admission and found to have suspected PE. Expresses relief that it is not graft rejection. Reports first year after heart transplant was very difficult, but that he has been doing really well for the last 2 years. Lives independently and able to drive. Has no home care, DME, or in-home supports of anykind. He reports anticipating not needing any discharge resources or services.    SW/Care management will continue to follow for needs as they arise.    Writer met with Jim at the bedside to discuss his desire to get back to participating in heart transplant support group again. Provided him with Zoom information and once/month in-person meetings. Also asked if he was agreeable to another transplant peer visitor-he gave approval for this.    Clara Casiano, MSW, [facility]  Heart Transplant/MCS   ph. [phone]  Vocera/Teams

## 2024-05-23 PROBLEM — Z11.52 ENCOUNTER FOR SCREENING FOR COVID-19: Status: ACTIVE | Noted: 2023-05-23

## 2024-05-23 NOTE — PLAN OF CARE
Temp: 98  F (36.7  C) Temp src: Oral BP: 116/78 Pulse: 93   Resp: 16 SpO2: 99 % O2 Device: None (Room air)       Neuro: A&O x4. Calls appropriately, able to make needs known. Pt c/o dizziness and lightheadedness - VSS.   Cardiac: Tele in place, SR w/ 1st degree AV block. Afebrile. No CP.   Resp: VSS on RA.   GI/: Voiding well via urinal. Small BM today - pt became dizzy when bearing down. Pt felt constipated - Miralax given. Pt also endorsed nausea - PRN zofran given with relief.   Skin: New RIJ puncture site - WNL.   LDAs: L PIV in place infusing heparin @ 1600 units/hr - next PTT @ 2105.   Activity: Up independently/SBA d/t dizziness.   Shift Events: RHC with biopsy today.       Plan: Continue to monitor and follow POC. Plan for Head MRI tomorrow. Notify Cards 2 with changes.

## 2024-05-23 NOTE — PLAN OF CARE
Goal Outcome Evaluation:  Mobility: Up independently, steady on feet, full weight bearing  A: Neuro: A/O x 4.  Uses call light appropriately.  Able to make needs known. Head ache controlled with Tylenol and Tramadol 50 mg.   Respiratory:  On room air. Denies shortness of breath at rest.  Cardiac: VSS. Monitor shows first degree AVB, LBBB  GI:  No report of nausea or vomiting. Tolerating current nutrition plan. NPO after MN  : Urine output adequate.   Skin:  See PCS flowsheet for assessment. Complained of itching - controlled with PO Benadryl, ordered as 1 time dose.   Drips:  Heparin drip turned off at 0359  Tests/procedures: None performed overnight.  Sleep: Able to rest overnight between cares.   Plan: Continue to monitor and notify team with any changes. NPO for RHC Thursday  For vital signs and complete assessments, please see documentation flowsheets.

## 2024-05-23 NOTE — PROGRESS NOTES
[facility]   Cardiology II Service / Advanced Heart Failure  Daily Progress Note      Patient: Jim Willingham  MRN: [id number]  Admission Date: 5/20/2024  Hospital Day # 3    Assessment and Plan: Mr. Jim Willingham is a 68yr old male with a history of NICM (s/p HM2 LVAD 6/2017) s/p OHT 5/6/21, VT, AFib, CKD, DMII, and COPD who presents for RESENDIZ and was found to have a PE.     His post-transplant course was c/b right pleural air leak (required prolonged chest tube), pAFib, CAP (RLL, 6/2021), recurrent pleural effusions (s/p thoracenteses x2 6/2021 and 7/2021, exudative, repeatedly cultured negative), RLL cavitary lesions (per chest CT 7/14/21, BD glucan indeterminate, aspergillus negative, not started on antifungals), pericardial effusion (1.4cm per TTE 7/12/21, conservatively managed), low-grade EBV viremia, recurrent/persistent gout flare, transaminase elevation with questionable choledocholithiasis (conservatively managed with resolution), COVID-19 (8/2021, s/p monoclonal antibodies and remdesivir x5 days), and 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       Today's Plan:  - Will defer further diuretic until we see his RHC results  - RHC and endomyocardial biopsy today  - Will turn on heparin once ok'd by cath lab (has been off since 4 am)  - F/up CT Chest/abd/pelvis non-contrast to search for occult malignancy  - RHC and biopsy tomorrow AM (needs to be pre-noon to rush path)  - If Cr not improving tomorrow, may need to get renal on board  - F/up Allomap    # Status post OHT on 5/2021, history of NICM  # Chronic immunosuppression  NT bro BNP-18,909  * TTE May 20, 2024 ECHO, LVEF of 55-60%, RV ventricular function is normal, trace to mild TR, IVC >2.1 cm and collapsing <50%, no effusion. No significant changes noted from prior   * RHC May 2023 w/ RA 8, PA 41/19 (26), wedge 15 and cardiac index 3.2; biopsy cellular grade 0R and pAMR 0  * Cors May 2023 w/ minimal  nonobstructive CAD (20% pRCA)  NT bro BNP-18,909  Rejection history:  none  AlloMap scores:  < 35 recently, allomap this admission pending  DSAs:  NEW DSA noted this admission     Immunosuppression:   --Continue PTA Cellcept 250 mg BID Q12 hours   --tacrolimus 3.5mg  AM / 3mg PM; goal 4-6, level is 4.8 on 5/22, 10.5 hour trough    Graft:  Volume status: Awaiting RHC  Bps: At goal, not on antihypertensives  HR: At baseline in the 90s     Serostatus: CMV: D+/R+, EBV: D+/R+, Toxo: D-/R-     Prophylaxis:   --CAV: aspirin 81 mg and rosuvastatin 10 mg daily   --Osteoporosis: calcium/vitamin D 500mg BID  --GI: Pantoprazole 40mg daily  --EBV: 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 Primary symptom of concern is dyspnea and fatigue. Ordinarily has no exertional limits, now cannot ambulate across the room without dyspnea. Elevated D dimers on presentation ,elevated NT bro BNP-18,909, V/Q scan with findings of wedge shaped defects in basilar segments of lower right lobe concerning for pulmonary embolism as per PISAPED criteria.ECHO without RV Dysfunction. Denies prior hx of PE, DVT. No imaging findings of DVT.   -Heparin gtt started (on hold for biopsy), will need to transition to coumadin when possible from the procedure prospective  -Will need minimum of 3 months of anticoagulation. Pharmacy consulted for medication coverage. Eliquis and Xarelto with high co-pays so will need coumadin  -Recommend outpatient cancer screening, but will get CT Chest/Abd/pelvis inpatient as well, non-contrast d/t renal function.  - Per Dr. Montaño no need to to consider more invasive intervention on the PE.    # Headache. Has been having intermittent headaches over the last 10 days. No neuro features, but it is not resolving and it is becoming more persistent. Tac  level is 4.4 so less likely that. Headach is worse in the morning when he wakes up  - Will plan for MRI- will need valium d/t claustrophobia     # WALTER on CKD  Baseline Scr ~1.4. On admission here ~3. Suspect possibly cardiorenal though will rule out a component of post-renal with PVR. No protein in urine to suggest glomerular process. FeURea suggests pre-renal cause. Renal ultrasound without hydronephrosis or obstruction.  - Strict I/Os  - RHC as above   - If not improving tomorrow will need to consider renal consult  - renally dose meds, avoid nephrotoxic meds     # Normocytic Anemia  Baseline ~11, at baseline here. Suspect anemia of renal disease  - Trend daily     # Elevated Troponin  116->112, suspect troponin leak in setting of PE and kidney injury. However, given RV not showing any dysfunction hard to say if it is proportion or not to his PE severity.   - Endomyocardioal biopsy tomorrow to assess for rejection, defering emperic treatment given preserved ef      #Medication induced antiplatelet effect - on pta aspirin  #Immunocompromised Host - on tacrolimus and mmf  #Prediabetes - A1c 5.8%      Diet: Fluid restriction 2000 ML FLUID  NPO per Anesthesia Guidelines for Procedure/Surgery Except for: Meds    DVT Prophylaxis: Heparin SQ  Russell Catheter: Not present  Cardiac Monitoring: ACTIVE order. Indication: Acute decompensated heart failure (48 hours)  Code Status: Full Code        Didi Butler PA-C  Advanced Heart Failure/Cardiology II Service  Vocbere preferred, or pager [phone]      Patient discussed with Dr. Montaño.        40 minutes spent on the date of the encounter doing chart review, history and exam, documentation and further activities per the note    ================================================================    Subjective/24-Hr Events:   Last 24 hr care team notes reviewed. He still feels like he has a little bit of fluid on board. More in his stomach. He still has the headache- it  has been persistent. Worse in the morning. B/l and frontal. No visual changes or other neurologic symptoms. Breathing feels about the same, gets dyspnic is he walks much , but at rest feels like breathing is fairly normal. No hemoptis. No chest pain. No palpitations. No other complaints or concerns.    ROS:  4 point ROS including respiratory, CV, GI and  (other than that noted in the HPI) is negative.     Medications: Reviewed in EPIC.     Physical Exam:   /77 (BP Location: Left arm)   Pulse 96   Temp 98.3  F (36.8  C) (Oral)   Resp 18   Wt 89.5 kg (197 lb 5 oz)   SpO2 98%   BMI 30.00 kg/m      GENERAL: Appears comfortable, in no distress .  HEENT: Eye symmetrical, no discharge or icterus bilaterally.   NECK: Supple, JVD difficult to assess.   CV: RRR, +S1S2, no murmur, rub, or gallop.   RESPIRATORY: Respirations regular, even, and unlabored. Lungs CTA throughout.    GI: Soft and non distended   EXTREMITIES: No peripheral edema. All extremities are warm and well perfused  NEUROLOGIC: Alert and interacting appropriately. No focal deficits.   SKIN: No jaundice. No rashes or lesions.     Labs:  CMP  Recent Labs   Lab 05/23/24  0511 05/22/24  1721 05/22/24  0525 05/21/24  0535 05/20/24  1403    136 135 137 135   POTASSIUM 4.3 4.4 4.2 4.1 5.1   CHLORIDE 104 102 103 103 101   CO2 21* 20* 19* 19* 19*   ANIONGAP 12 14 13 15 15   GLC 99 99 105* 85 124*   BUN 70.4* 74.7* 80.1* 84.5* 84.9*   CR 2.86* 2.89* 2.87* 2.92* 3.04*   GFRESTIMATED 24* 23* 23* 23* 22*   QAMAR 8.9 9.1 8.6* 8.4* 8.8   MAG 2.1  --  2.1 2.0 2.3   PROTTOTAL  --   --   --  6.1* 7.1   ALBUMIN  --   --   --  3.7 4.4   BILITOTAL  --   --   --  0.5 0.6   ALKPHOS  --   --   --  110 132   AST  --   --   --  34 48*   ALT  --   --   --  38 49       CBC  Recent Labs   Lab 05/23/24  1026 05/23/24  1025 05/23/24  0511 05/22/24  0525 05/21/24  0535 05/20/24  1733   WBC  --   --  5.5 5.7 7.5 6.3   RBC  --   --  3.36* 3.49* 3.61* 3.98*   HGB 10.2* 10.4*  10.0* 10.3* 10.7* 11.9*   HCT  --   --  31.5* 32.6* 34.0* 37.9*   MCV  --   --  94 93 94 95   MCH  --   --  29.8 29.5 29.6 29.9   MCHC  --   --  31.7 31.6 31.5 31.4*   RDW  --   --  13.8 13.9 14.0 13.9   PLT  --   --  223 224 238 270       INR  Recent Labs   Lab 05/23/24  0511 05/22/24  0525 05/21/24  0535   INR 1.29* 1.36* 1.49*

## 2024-05-23 NOTE — PHARMACY-ANTICOAGULATION SERVICE
Clinical Pharmacy - Warfarin Dosing Consult     Pharmacy has been consulted to manage this patient s warfarin therapy.  Indication: DVT/ PE Treatment  Therapy Goal: INR 2-3  Provider/Team: CARDS 2  Warfarin Prior to Admission: No (Patient recieved warfarin while he had LVAD (Last dose: 2021))  Significant drug interactions: Allopurinol, ASA, heparin infusion, rosuvastatin, tramadol    INR   Date Value Ref Range Status   05/23/2024 1.29 (H) 0.85 - 1.15 Final   05/22/2024 1.36 (H) 0.85 - 1.15 Final       Recommend warfarin 5 mg today. Pharmacy will monitor Jim Willingham daily and order warfarin doses to achieve specified goal.      Please contact pharmacy as soon as possible if the warfarin needs to be held for a procedure or if the warfarin goals change.      Ysabel Menjivar [facility] on 5/23/2024 at 4:43 PM

## 2024-05-23 NOTE — PRE-PROCEDURE
GENERAL PRE-PROCEDURE:   Procedure:  Right heart catheterization and endomyocardial biopsy  Date/Time:  5/23/2024 8:55 AM    Written consent obtained?: Yes    Risks and benefits: Risks, benefits and alternatives were discussed    : patient will remain in the hospital.  Consent given by:  Patient  Patient states understanding of procedure being performed: Yes    Patient's understanding of procedure matches consent: Yes    Procedure consent matches procedure scheduled: Yes    : no sedation.  Appropriately NPO:  Yes  Mallampati  :  Grade 3- soft palate visible, posterior pharyngeal wall not visible  Lungs:  Lungs clear with good breath sounds bilaterally  Heart:  Normal heart sounds and rate  History & Physical reviewed:  History and physical reviewed and no updates needed  Statement of review:  I have reviewed the lab findings, diagnostic data, medications, and the plan for sedation      Consenting/Education for Cardiology Procedure: Right heart catheterization  and Cardiac biopsy    Patient understands we would like to perform the listed procedure(s) due to heart transplant.    The patient understands the following:     The procedure was described to the patient in detail.    No sedation is planned for this procedure. Patient understands risks and complications of the procedure which include but are not limited to bruising/swelling around the incision site, infection, bleeding, allergic reaction to local anesthetic, air embolism, arterial puncture, stroke, heart attack, need for emergency heart surgery, death.       Patient verbalized understanding of risks and benefits and has elected to proceed with the procedure or procedures listed above.    Didi Butler PA-C  Cardiology

## 2024-05-23 NOTE — CONSULTS
Nephrology Initial Consult  May 23, 2024      Jim Willingham MRN:[id number] YOB: 1955  Date of Admission:5/20/2024  Primary care provider: Ricardo Gómez  Requesting physician: Riaz Montaño MD    ASSESSMENT AND RECOMMENDATIONS:   Jim Willingham is a 67 yo male with a history of NICM (s/p HM2 LVAD 6/2017) s/p OHT 5/6/21, gastric bypass, CKD 3, DM type II, gout, and COPD who presented for RESENDIZ and was found to have a PE. Nephrology was consulted for WALTER.    Recommendations:  - Spin urine (we will do this)  - Orthostatic testing  - Urine protein/creatine ratio (ordered for you)  - Uric acid, iron studies, phosphate (ordered for you)  - Hold Lasix tomorrow if Cr has not improved or is unchanged  - Recommend IV iron infusion     WLATER on CKD stage 3b  CKD was diagnosed years ago; pt does not recall being told the etiology. May be due to pt's DM, HTN, or past cardiorenal from heart failure. Baseline Cr ~1.4-1.8. Cr 3 on admission. Initial workup with bland UA, no hydronephrosis on renal US, most recent tacrolimus level 4.8. New DSA noted, but RHC today reveal RA 10, wedge 15. Reported baseline weight 192-195 lbs. Cr improved to 2.8 with diuretics but has not improved further thus far. Pt was hypotensive with SBP 80s on 5/22 but Cr did not change much after this episode. Suspect initially prerenal/cardiorenal vs EMILY (with tacro use), then possible over-diuresis with Lasix.   - Spin urine   - Orthostatic testing for reported new lightheadedness   - Urine protein/creatine ratio, uric acid, phosphate  - Hold Lasix tomorrow if Cr has not improved or is unchanged    Normocytic anemia, chronic  Baseline hgb ~11. Hgb 10 at the time of consult without overt signs of bleeding. Pt took venofir in the past.   - Ordered iron studies showing iron 16 and iron sat 5%  - Recommend IV iron infusion     Hypertension   SBP has been 100-110s. Not currently on any BP meds. Pt reports he has not needed HTN for quite some time.    - CTM    BMD  Mild hyperphosphatemia likely iso WALTER.  - CTM      Recommendations were communicated to primary team via verbal/note    Seen and discussed with Dr. Jigar Jefferson MD   Internal Medicine PGY-1      REASON FOR CONSULT: WALTER     HISTORY OF PRESENT ILLNESS:  Admitting provider and nursing notes reviewed  Jim Willingham is a 68 year old with a history of NICM (s/p HM2 LVAD 6/2017) s/p OHT 5/6/21, gastric bypass, CKD 3, DM type II, gout, and COPD who presented for RESENDIZ and was found to have a PE.    Pt reports 10 days of SOB, fatigue and HA with exertion at home. He also notes new lightheadedness with standing from a sitting position in the last couple of weeks. He notes he has never had a blood clot before. In regards to his kidneys, he is urinating fine. He has had elevated Cr for a long time (years), but he does not know why he has kidney disease. He does not think anyone told him why. He also admits he has not seen a nephrologist. He does mention that his PSA was elevated recently. No NSAID use.       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    Generalized osteoarthritis 09/26/2023    Gouty arthropathy, chronic, without tophi 11/02/2020    H/O adenomatous polyp of colon 08/03/2016    2 polyps    H/O gastric bypass 05/11/2017    History of type 2 diabetes mellitus 03/28/2023    Hyperlipidemia LDL goal <70 09/27/2022    ICD (implantable cardioverter-defibrillator),  biventricular, in situ 05/11/2017    IFG (impaired fasting glucose) 09/26/2023    LVAD (left ventricular assist device) present (H)     Major depression, recurrent, chronic (H24) 11/02/2020    Mild anemia 03/18/2022    NICM (nonischemic cardiomyopathy) (H)/ EF 20% 05/11/2017    ECHO: LVEDd. 7.66 cm, Restrictive pattern , Severe mitral valve regurgitation    CECILIA (obstructive sleep apnea) 05/11/2017    Paroxysmal atrial fibrillation (H) 05/11/2017    Paroxysmal VT (H) 05/11/2017    Peripheral polyneuropathy 03/28/2023    Postsurgical dumping syndrome 03/21/2024    Pulmonary cavitary lesion 11/18/2021    Rotator cuff tear arthropathy of both shoulders 11/02/2020    Type 2 diabetes mellitus with diabetic polyneuropathy (H) 03/18/2022    Uncomplicated asthma     Vitamin B12 deficiency (non anemic) 05/11/2017       Past Surgical History:   Procedure Laterality Date    ANESTHESIA CARDIOVERSION N/A 05/11/2020    Procedure: ANESTHESIA, FOR CARDIOVERSION @1100;  Surgeon: GENERIC ANESTHESIA PROVIDER;  [facility]    BRONCHOSCOPY (RIGID OR FLEXIBLE), DIAGNOSTIC N/A 8/30/2021    Procedure: BRONCHOSCOPY, WITH BRONCHOALVEOLAR LAVAGE;  Surgeon: Perlman, David Morris, MD;  [facility]    COLONOSCOPY N/A 4/18/2024    Procedure: COLONOSCOPY, WITH POLYPECTOMY;  Surgeon: Jermaine Root MD;  [facility]    CV CORONARY ANGIOGRAM N/A 5/17/2022    Procedure: Coronary Angiogram;  Surgeon: Jeffrey Gibson MD;  Location:  [facility]    CV CORONARY ANGIOGRAM N/A 5/23/2023    Procedure: Coronary Angiogram;  Surgeon: Scout Robins MD;  Location:  [facility]    CV HEART BIOPSY N/A 5/13/2021    Procedure: Heart Biopsy;  Surgeon: Scout Robins MD;  Location:  [facility]    CV HEART BIOPSY N/A 5/20/2021    Procedure: Heart Biopsy;  Surgeon: Jeffrey Gibson MD;  Location:  [facility]    CV HEART BIOPSY N/A 5/27/2021     Procedure: Heart Biopsy;  Surgeon: Jac Dover MD;  [facility]    CV HEART BIOPSY N/A 6/7/2021    Procedure: CV HEART BIOPSY;  Surgeon: Jeffrey Gibson MD;  [facility]    CV HEART BIOPSY N/A 6/21/2021    Procedure: CV HEART BIOPSY;  Surgeon: Scout Robins MD;  [facility]    CV HEART BIOPSY N/A 7/5/2021    Procedure: CV HEART BIOPSY;  Surgeon: Jeffrey Gibson MD;  [facility]    CV HEART BIOPSY N/A 7/16/2021    Procedure: Heart Biopsy;  Surgeon: Amadeo Art MD;  Location:  [facility]    CV HEART BIOPSY N/A 8/5/2021    Procedure: Heart Biopsy;  Surgeon: Jeffrey Gibson MD;  [facility]    CV HEART BIOPSY N/A 8/31/2021    Procedure: Heart Cath Heart Biopsy;  Surgeon: Jeffrey Gibson MD;  [facility]    CV HEART BIOPSY N/A 9/21/2021    Procedure: CV HEART BIOPSY;  Surgeon: Jeffrey Gibson MD;  Location:  [facility]    CV HEART BIOPSY N/A 10/5/2021    Procedure: CV HEART BIOPSY;  Surgeon: Jeffrey Gibson MD;  [facility]    CV HEART BIOPSY N/A 11/4/2021    Procedure: CV HEART BIOPSY;  Surgeon: Nicola Seth MD;  [facility]    CV HEART BIOPSY N/A 5/17/2022    Procedure: Heart Biopsy;  Surgeon: Jeffrey Gibson MD;  Location:  [facility]    CV HEART BIOPSY N/A 5/23/2023    Procedure: Heart Biopsy;  Surgeon: Scout Robins MD;  Location:  [facility]ATH [facility]    CV RIGHT HEART CATH MEASUREMENTS RECORDED N/A 07/24/2019    Procedure: CV RIGHT HEART CATH;  Surgeon: Renu Sears MD;  Location:  [facility]ATH [facility]    CV RIGHT HEART CATH MEASUREMENTS RECORDED N/A 08/05/2020    Procedure: CV RIGHT HEART CATH;  Surgeon: Nicola Seth MD;   Location:  [facility]    CV RIGHT HEART CATH MEASUREMENTS RECORDED N/A 01/07/2021    Procedure: CV RIGHT HEART CATH;  Surgeon: Jac Dover MD;  [facility]    CV RIGHT HEART CATH MEASUREMENTS RECORDED N/A 02/23/2021    Procedure: Heart Cath Right Heart Cath;  Surgeon: Jeffrey Gibson MD;  Location:  [facility]    CV RIGHT HEART CATH MEASUREMENTS RECORDED N/A 03/23/2021    Procedure: Heart Cath Right Heart Cath. request for 3/23;  Surgeon: Jeffrey Gibson MD;  Location:  [facility]    CV RIGHT HEART CATH MEASUREMENTS RECORDED N/A 5/13/2021    Procedure: Right Heart Cath;  Surgeon: Scout Robins MD;  Location:  [facility]    CV RIGHT HEART CATH MEASUREMENTS RECORDED N/A 5/20/2021    Procedure: Right Heart Cath;  Surgeon: Jeffrey Gibson MD;  Location:  [facility]    CV RIGHT HEART CATH MEASUREMENTS RECORDED N/A 5/27/2021    Procedure: Right Heart Cath;  Surgeon: Jac Dover MD;  Location:  [facility]    CV RIGHT HEART CATH MEASUREMENTS RECORDED N/A 6/7/2021    Procedure: CV RIGHT HEART CATH;  Surgeon: Jeffrey Gibson MD;  Location:  [facility]    CV RIGHT HEART CATH MEASUREMENTS RECORDED N/A 6/21/2021    Procedure: CV RIGHT HEART CATH;  Surgeon: Scout Robins MD;  Location:  [facility]    CV RIGHT HEART CATH MEASUREMENTS RECORDED N/A 7/5/2021    Procedure: CV RIGHT HEART CATH;  Surgeon: Jeffrey Gibson MD;  Location:  [facility]    CV RIGHT HEART CATH MEASUREMENTS RECORDED N/A 7/16/2021    Procedure: Right Heart Cath;  Surgeon: Amadeo Art MD;  Location:  [facility]    CV RIGHT HEART CATH MEASUREMENTS RECORDED N/A 8/5/2021    Procedure: CV RIGHT HEART CATH;  Surgeon: Jeffrey Gibson MD;  Location:  [facility]     CV RIGHT HEART CATH MEASUREMENTS RECORDED N/A 8/31/2021    Procedure: Heart Cath Right Heart Cath;  Surgeon: Jeffrey Gibson MD;  Location:  [facility]    CV RIGHT HEART CATH MEASUREMENTS RECORDED N/A 9/21/2021    Procedure: CV RIGHT HEART CATH;  Surgeon: Jeffrey Gibson MD;  Location:  [facility]    CV RIGHT HEART CATH MEASUREMENTS RECORDED N/A 10/5/2021    Procedure: CV RIGHT HEART CATH;  Surgeon: Jeffrey Gibson MD;  Location:  [facility]    CV RIGHT HEART CATH MEASUREMENTS RECORDED N/A 11/4/2021    Procedure: CV RIGHT HEART CATH;  Surgeon: Nicola Seth MD;  Location:  [facility]    CV RIGHT HEART CATH MEASUREMENTS RECORDED N/A 5/17/2022    Procedure: Right Heart Catheterization;  Surgeon: Jeffrey Gibson MD;  Location:  [facility]    CV RIGHT HEART CATH MEASUREMENTS RECORDED N/A 5/23/2023    Procedure: Right Heart Catheterization;  Surgeon: Scout Robins MD;  Location:  [facility]    GI SURGERY  2003    [facility]    INSERT VENTRICULAR ASSIST DEVICE LEFT (HEARTMATE II) N/A 06/19/2017    Procedure: INSERT VENTRICULAR ASSIST DEVICE LEFT (HEARTMATE II);  Median Sternotomy Heartmate II Left Ventricular Assist Device Insertion on Pump Oxygenator;  Surgeon: Ronnie Quigley MD;  [facility]    IR CHEST TUBE PLACEMENT NON-TUNNELED RIGHT  7/11/2021    LAPAROSCOPY DIAGNOSTIC (GENERAL) N/A 1/4/2024    Procedure: Diagnostic Laparoscopy, Exploratory Laparotomy with Extensive lysis of adhesions.;  Surgeon: Frederick Montgomery MD;  [facility]    ORTHOPEDIC SURGERY  1994    right knee wired    PICC DOUBLE LUMEN PLACEMENT Right 09/23/2020    5FR PICC DL. Length-43cm (1cm out).    PICC INSERTION - DOUBLE LUMEN Right 05/09/2021    IK/BRACH    RELEASE CARPAL TUNNEL BILATERAL Bilateral 02/18/2021    Procedure: Bilateral carpal tunnel release;  Surgeon: Jermaine Brand MD;   [facility]    TRANSPLANT HEART RECIPIENT N/A 05/05/2021    Procedure: Redo median sternotomy, lysis of adhesions, heart transplant recipient, on cardiopulmonary bypass, intraoperative transesophageal echocardiogram per anesthesia, Implantable Cardioverter Defibrillator (ICD) removal;  Surgeon: Ronnie Quigley MD;  [facility]        MEDICATIONS:  PTA Meds  Prior to Admission medications    Medication Sig Last Dose Taking? Auth Provider Long Term End Date   acetaminophen (TYLENOL) 325 MG tablet Take 3 tablets (975 mg) by mouth every 8 hours as needed for mild pain 5/19/2024 at unknown Yes Frederick Montgomery MD No    albuterol (PROAIR HFA/PROVENTIL HFA/VENTOLIN HFA) 108 (90 Base) MCG/ACT inhaler Inhale 2 puffs into the lungs every 4 hours as needed for shortness of breath, wheezing or cough 5/20/2024 at am Yes Ricardo Gómez MD Yes    allopurinol (ZYLOPRIM) 300 MG tablet TAKE 1 TABLET BY MOUTH DAILY 5/20/2024 at am Yes Ricardo óGmez MD No    aspirin (ASA) 81 MG chewable tablet Take 1 tablet (81 mg) by mouth daily 5/20/2024 at am Yes Clara Valentin PA-C     buPROPion (WELLBUTRIN XL) 300 MG 24 hr tablet Take 1 tablet (300 mg) by mouth every morning 5/20/2024 at am Yes Ricardo Gómez MD Yes    cyanocobalamin (VITAMIN B-12) 1000 MCG tablet Take 1,000 mcg by mouth daily Unknown at unknown Yes Unknown, Entered By History     diphenhydrAMINE (BENADRYL) 25 MG tablet Take 25 mg by mouth every 6 hours as needed for itching Past Week at unknown Yes Reported, Patient No    gabapentin (NEURONTIN) 300 MG capsule Take 300 mg by mouth daily 5/20/2024 at am Yes Reported, Patient No    ipratropium - albuterol 0.5 mg/2.5 mg/3 mL (DUONEB) 0.5-2.5 (3) MG/3ML neb solution Take 1 vial by nebulization 4 times daily as needed for shortness of breath, wheezing or cough Unknown at unknown Yes Reported, Patient No    Melatonin 10 MG CAPS Take 1 capsule by mouth nightly as needed Past Week at unknown Yes Reported, Patient      montelukast (SINGULAIR) 10 MG tablet TAKE 1 TABLET BY MOUTH AT  BEDTIME 5/19/2024 at pm Yes Ricardo Gómez MD Yes    mycophenolate (GENERIC EQUIVALENT) 250 MG capsule TAKE one CAPSULE BY MOUTH TWICE A DAY 5/20/2024 at am Yes Delisa Montgomery MD Yes    polyethylene glycol (MIRALAX) 17 GM/Dose powder Take 1 Capful by mouth daily as needed for constipation Unknown at unknown Yes Reported, Patient     tacrolimus (GENERIC EQUIVALENT) 0.5 MG capsule Take 0.5 mg by mouth every morning With 3 mg capsules 5/20/2024 at am Yes Delisa Montgomery MD     tacrolimus (GENERIC EQUIVALENT) 1 MG capsule TAKE 3 CAPSULES BY MOUTh IN am and in pm. 5/20/2024 at am Yes Delisa Montgomery MD     traMADol (ULTRAM) 50 MG tablet Take 50 mg by mouth every 6 hours as needed for severe pain Unknown at unknown Yes Reported, Patient     VITAMIN D PO Take 1 capsule by mouth daily 5/20/2024 at am Yes Reported, Patient     rosuvastatin (CRESTOR) 10 MG tablet Take 1 tablet (10 mg) by mouth daily   Ricardo Gómez MD Yes       Current Meds  Current Facility-Administered Medications   Medication Dose Route Frequency Provider Last Rate Last Admin    allopurinol (ZYLOPRIM) tablet 300 mg  300 mg Oral Daily Emmanuel Hernandez MD   300 mg at 05/23/24 0757    aspirin (ASA) chewable tablet 81 mg  81 mg Oral Daily Emmanuel Hernandez MD   81 mg at 05/23/24 0757    buPROPion (WELLBUTRIN XL) 24 hr tablet 300 mg  300 mg Oral QAM Emmanuel Hernandez MD   300 mg at 05/23/24 0757    melatonin tablet 10 mg  10 mg Oral At Bedtime Mirian Oviedo MD   10 mg at 05/22/24 2222    montelukast (SINGULAIR) tablet 10 mg  10 mg Oral At Bedtime Emmanuel Hernandez MD   10 mg at 05/22/24 2222    mycophenolate (GENERIC EQUIVALENT) capsule 500 mg  500 mg Oral BID IS Didi Butler PA-C        rosuvastatin (CRESTOR) tablet 10 mg  10 mg Oral Daily Emmanuel Hernandez MD   10 mg at 05/23/24 0757    tacrolimus (GENERIC EQUIVALENT) capsule 3 mg  3 mg Oral QPM Emmanuel Hernandez MD   3 mg  at 24 1840    tacrolimus (GENERIC EQUIVALENT) capsule 3.5 mg  3.5 mg Oral Emmanuel Rawls MD   3.5 mg at 24 0756     Infusion Meds  Current Facility-Administered Medications   Medication Dose Route Frequency Provider Last Rate Last Admin    heparin 25,000 units in 0.45% NaCl 250 mL ANTICOAGULANT infusion  0-5,000 Units/hr Intravenous Continuous Didi Butler PA-C   Stopped at 24 0359    Reason ACE/ARB/ARNI order not selected   Other DOES NOT GO TO Danay Perez MD        Reason beta blocker not prescribed   Does not apply DOES NOT GO TO Danay Perez MD           ALLERGIES:    Allergies   Allergen Reactions    Grass Shortness Of Breath    Ace Inhibitors Cough    Cats     Dust Mites Other (See Comments)     Asthma    Mold Other (See Comments)     Asthma    Penicillins Other (See Comments)     Unknown - childhood exposure    Tolerated Zosyn -2020    Per patient report he has tolerated amoxicillin    Sulfa Antibiotics Other (See Comments) and Unknown     Unknown childhood reaction       REVIEW OF SYSTEMS:  A comprehensive of systems was negative except as noted above.    SOCIAL HISTORY:   Social History     Socioeconomic History    Marital status:      Spouse name: Not on file    Number of children: Not on file    Years of education: Not on file    Highest education level: Not on file   Occupational History    Not on file   Tobacco Use    Smoking status: Former     Current packs/day: 0.00     Types: Cigarettes     Quit date:      Years since quittin.4     Passive exposure: Never    Smokeless tobacco: Never   Vaping Use    Vaping status: Never Used   Substance and Sexual Activity    Alcohol use: No    Drug use: No    Sexual activity: Yes     Partners: Female   Other Topics Concern    Parent/sibling w/ CABG, MI or angioplasty before 65F 55M? No   Social History Narrative    Not on file     Social Determinants of Health     Financial Resource  Strain: High Risk (2023)    Financial Resource Strain     Within the past 12 months, have you or your family members you live with been unable to get utilities (heat, electricity) when it was really needed?: Yes   Food Insecurity: Low Risk  (2023)    Food Insecurity     Within the past 12 months, did you worry that your food would run out before you got money to buy more?: No     Within the past 12 months, did the food you bought just not last and you didn t have money to get more?: No   Transportation Needs: Low Risk  (2023)    Transportation Needs     Within the past 12 months, has lack of transportation kept you from medical appointments, getting your medicines, non-medical meetings or appointments, work, or from getting things that you need?: No   Physical Activity: Not on file   Stress: Not on file   Social Connections: Not on file   Interpersonal Safety: Low Risk  (2023)    Interpersonal Safety     Do you feel physically and emotionally safe where you currently live?: Yes     Within the past 12 months, have you been hit, slapped, kicked or otherwise physically hurt by someone?: No     Within the past 12 months, have you been humiliated or emotionally abused in other ways by your partner or ex-partner?: No   Housing Stability: Low Risk  (2023)    Housing Stability     Do you have housing? : Yes     Are you worried about losing your housing?: No       FAMILY MEDICAL HISTORY:   Family History   Problem Relation Age of Onset    Cerebrovascular Disease Mother 64    Diabetes Mother     Hypertension Mother     Coronary Artery Disease Father     Diabetes Type 2  Father     Obesity Brother     Obesity Brother     Cerebrovascular Disease Daughter 40       PHYSICAL EXAM:   Temp  Av.2  F (36.8  C)  Min: 97.4  F (36.3  C)  Max: 98.8  F (37.1  C)      Pulse  Av.6  Min: 54  Max: 104 Resp  Av.8  Min: 16  Max: 20  SpO2  Av.1 %  Min: 97 %  Max: 100 %       /88   Pulse 94    Temp 98  F (36.7  C) (Oral)   Resp 16   Wt 89.5 kg (197 lb 5 oz)   SpO2 100%   BMI 30.00 kg/m       General Appearance: NAD, lying comfortably in bed  Respiratory: CTA bilaterally without crackles or wheezes   Cardiovascular: RRR without murmurs  GI: Soft, nontender, nondistended, normal active bowel sounds  Ext: No BLE   Neuro: A&Ox3  Skin: No rashes or lesions    Date 05/23/24 0700 - 05/24/24 0659   Shift 0402-7869 5103-1975 2970-8698 24 Hour Total   INTAKE   P.O. 555   555   Shift Total(mL/kg) 555(6.2)   555(6.2)   OUTPUT   Urine 200   200   Shift Total(mL/kg) 200(2.23)   200(2.23)   Weight (kg) 89.5 89.5 89.5 89.5      Admit Weight: 91.4 kg (201 lb 9.6 oz)       LABS:   I have reviewed the following labs:  CMP  Recent Labs   Lab 05/23/24  0511 05/22/24  1721 05/22/24  0525 05/21/24  0535 05/20/24  1403    136 135 137 135   POTASSIUM 4.3 4.4 4.2 4.1 5.1   CHLORIDE 104 102 103 103 101   CO2 21* 20* 19* 19* 19*   ANIONGAP 12 14 13 15 15   GLC 99 99 105* 85 124*   BUN 70.4* 74.7* 80.1* 84.5* 84.9*   CR 2.86* 2.89* 2.87* 2.92* 3.04*   GFRESTIMATED 24* 23* 23* 23* 22*   QAMAR 8.9 9.1 8.6* 8.4* 8.8   MAG 2.1  --  2.1 2.0 2.3   PHOS 4.6*  --   --   --   --    PROTTOTAL  --   --   --  6.1* 7.1   ALBUMIN  --   --   --  3.7 4.4   BILITOTAL  --   --   --  0.5 0.6   ALKPHOS  --   --   --  110 132   AST  --   --   --  34 48*   ALT  --   --   --  38 49     CBC  Recent Labs   Lab 05/23/24  1026 05/23/24  1025 05/23/24  0511 05/22/24  0525 05/21/24  0535 05/20/24  1733   HGB 10.2* 10.4* 10.0* 10.3* 10.7* 11.9*   WBC  --   --  5.5 5.7 7.5 6.3   RBC  --   --  3.36* 3.49* 3.61* 3.98*   HCT  --   --  31.5* 32.6* 34.0* 37.9*   MCV  --   --  94 93 94 95   MCH  --   --  29.8 29.5 29.6 29.9   MCHC  --   --  31.7 31.6 31.5 31.4*   RDW  --   --  13.8 13.9 14.0 13.9   PLT  --   --  223 224 238 270     INR  Recent Labs   Lab 05/23/24  0511 05/22/24  0525 05/21/24  0535   INR 1.29* 1.36* 1.49*     ABGNo lab results found in last 7  days.   URINE STUDIES  Recent Labs   Lab Test 05/21/24  1306 05/20/24  1351 03/21/24  1007 01/06/24  1351   COLOR Light Yellow Straw Yellow Light Yellow   APPEARANCE Clear Clear Clear Clear   URINEGLC Negative Negative Negative Negative   URINEBILI Negative Negative Negative Negative   URINEKETONE Trace* Negative Negative Negative   SG 1.015 1.007 1.015 1.017   UBLD Negative Negative Trace* Moderate*   URINEPH 5.5 6.5 5.5 5.5   PROTEIN Negative Negative Trace* 50*   UROBILINOGEN  --   --  0.2  --    NITRITE Negative Negative Negative Negative   LEUKEST Negative Negative Trace* Negative   RBCU <1 0 0-2 1   WBCU <1 <1 5-10* 1     No lab results found.  PTH  Recent Labs   Lab Test 03/28/23  1129   PTHI 529*     IRON STUDIES  Recent Labs   Lab Test 05/23/24  0511 08/05/21  0931 05/13/21  0534 02/09/21  0518 01/21/21  1350 09/10/20  0830 09/19/19  1129 07/31/19  1050 07/03/19  0828 11/20/18  0934 10/19/18  1007 07/06/18  0856 05/24/17  0652   IRON 16* 30* 38 28* 36 29* 139 40 51 48 43 34* 63    240 215* 285 381 294 348 367 367 353 341 430 248   IRONSAT 5* 13* 18 10* 10* 10* 40 11* 14* 14* 13* 8* 25   VITA 24* 98 98 33  --   --   --  41 46 71  --  45 200       IMAGING:  US renal  IMPRESSION: No hydronephrosis. Simple left renal cyst.      Sana Jefferson MD

## 2024-05-24 PROBLEM — I26.99 PULMONARY EMBOLISM (H): Status: ACTIVE | Noted: 2024-01-01

## 2024-05-24 NOTE — PROGRESS NOTES
[facility]   Cardiology II Service / Advanced Heart Failure  Daily Progress Note      Patient: Jim Willingham  MRN: [id number]  Admission Date: 5/20/2024  Hospital Day # 4    Assessment and Plan: Mr. Jim Willingham is a 68yr old male with a history of NICM (s/p HM2 LVAD 6/2017) s/p OHT 5/6/21, VT, AFib, CKD, DMII, and COPD who presents for RESENDIZ and was found to have a PE.     His post-transplant course was c/b right pleural air leak (required prolonged chest tube), pAFib, CAP (RLL, 6/2021), recurrent pleural effusions (s/p thoracenteses x2 6/2021 and 7/2021, exudative, repeatedly cultured negative), RLL cavitary lesions (per chest CT 7/14/21, BD glucan indeterminate, aspergillus negative, not started on antifungals), pericardial effusion (1.4cm per TTE 7/12/21, conservatively managed), low-grade EBV viremia, recurrent/persistent gout flare, transaminase elevation with questionable choledocholithiasis (conservatively managed with resolution), COVID-19 (8/2021, s/p monoclonal antibodies and remdesivir x5 days), and 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     Today's Plan:  - No further diuretics  - Will give small fluid bolus given his orthostatia (cautious given Ra of 10, but possible this is PE related and less of an indicator for volume status in this scenario)  - Recheck orthostatic blood pressures tomorrow  - OT/PT  - Increased cellcept to 500 mg BID, no changes to tacrolimus goal at this time  - F/up C3d and C4d staining (initial read on biopsy is no ACR and no AMR, but the staining is pending)  - F/up CT Chest/abd/pelvis non-contrast to search for occult malignancy (still only with prelim read, although this is reassuring)  - IV iron as per renal  - brain MRI for persistent headache is pending  - started coumadin 5/23, bridging with heparin gtt    # Status post OHT on 5/2021, history of NICM  # Chronic immunosuppression  NT bro BNP-18,909  * TTE May 20, 2024 ECHO,  LVEF of 55-60%, RV ventricular function is normal, trace to mild TR, IVC >2.1 cm and collapsing <50%, no effusion. No significant changes noted from prior   * RHC May 2023 w/ RA 8, PA 41/19 (26), wedge 15 and cardiac index 3.2; biopsy cellular grade 0R and pAMR 0  * Cors May 2023 w/ minimal nonobstructive CAD (20% pRCA)  NT bro BNP-18,909  Rejection history:  none  AlloMap scores:  < 35 recently, allomap this admission 32  DSAs:  NEW DSA noted this admission     Immunosuppression:   --Increased Cellcept to 500 mg BID Q12 hours (PTA was on 250 mg BID), increased d/t immuknow and new DSA  --tacrolimus 3.5mg  AM / 3mg PM; goal 4-6, level is 5.0 on 5/24, 10.5 hour trough. Next recheck on Monday    Graft:  Volume status: Euvolemic to mild hypovolemia given his orthostatic hypotention- fluid management as above  Bps: At goal, not on antihypertensives  HR: At baseline in the 90s     Serostatus: CMV: D+/R+, EBV: D+/R+, Toxo: D-/R-     Prophylaxis:   --CAV: aspirin 81 mg and rosuvastatin 10 mg daily   --Osteoporosis: calcium/vitamin D 500mg BID  --GI: Pantoprazole 40mg daily  --EBV: Known low grade EBV viremia being serially monitored (baseline ~6k)  --CMV: Not needed  --Toxo: Not needed    # Episodic junctional rhythm, asymptomatic. Noted on tele nigh of 5/23-5/24, had 6 minutes of junctional bradycardia. He was asleep. Has had one other very short run of this.  - Daily tele reviews, if having more events, may need to consider intervention, especially if symptomatic or happening in the daytime    # Pulmonary Embolism, low risk  # Elevated D-Dimer 2.11  Started to notice symptoms ~10 days prior to admission. Primary symptom of concern is dyspnea and fatigue. Ordinarily has no exertional limits, now cannot ambulate across the room without dyspnea. Elevated D dimers on presentation ,elevated NT bro BNP-18,909, V/Q scan with findings of wedge shaped defects in basilar segments of lower right lobe concerning for pulmonary  embolism as per PISAPED criteria.ECHO without RV Dysfunction. Denies prior hx of PE, DVT. No imaging findings of DVT.   -High intensity heparin gtt   -Coumadin started 5/23, will need to continue heparin gtt unless renal function improves to a point that we can safely use lovenox  -Will need minimum of 3 months of anticoagulation. Pharmacy consulted for medication coverage. Eliquis and Xarelto with high co-pays so will need coumadin  -Recommend outpatient cancer screening, (f/up final C/A/P read but thus far, no obvious malignancy on the non-contrast images)  - Per Dr. Montaño no need to to consider more invasive intervention on the PE.    # Headache. Has been having intermittent headaches over the last 10 days. No neuro features, but it is not resolving and it is becoming more persistent. Tac level is 4.4 so less likely that. Headach is worse in the morning when he wakes up  - Will plan for MRI- will need ativan (no valium d/t renal function) d/t claustrophobia     # WALTER on CKD  Baseline Scr ~1.4. On admission here ~3. Initially felt to be cardiorenal d/t reported weight gain, no significant improvement or worsening with IV lasix.  No protein in urine to suggest glomerular process. FeURea suggests pre-renal cause. Renal ultrasound without hydronephrosis or obstruction. Spun urine was bland per renal. Felt to be less likely embolic d/t no wedge infarcts on CT imaging. RHC does not suggest hypovolemia, but possibly the CVP is elevated d/t PE and not d/t volume.  - Appreciate renal consult  - Renal  is sending hemolysis labs (LDH and hapto)  - Trial of fluids today as above, no further diuretics planned (last dose 5/22)  - Already running tacrolimus quite low, at this point will plan for unchanged goal, but if not seeing renal improvement possible we will need to transition out of this  - renally dose meds, avoid nephrotoxic meds  - no current indication for renal biopsy, so okay for coumadin per renal team     #  Normocytic Anemia  Baseline ~11, at baseline here. Suspect anemia of renal disease  - Trend daily  - IV iron course ordered for 5/24-5/28, may need to complete outpatient     # Elevated Troponin  116->112, suspect troponin leak in setting of PE and kidney injury. However, given RV not showing any dysfunction hard to say if it is proportion or not to his PE severity.   - Endomyocardioal biopsy prelim negative for ACR and AMR, but c4d and c3d staining pending      #Medication induced antiplatelet effect - on pta aspirin  #Immunocompromised Host - on tacrolimus and mmf  #Prediabetes - A1c 5.8%    # Constipation. Having intermittent firm BMS.  - Mirilax daily  - Ducolax suppository prn  - Has not felt that senna has been helpful      Diet: Regular adult diet, no fluid restriction   DVT Prophylaxis: Heparin SQ  Russell Catheter: Not present  Cardiac Monitoring: ACTIVE   Code Status: Full Code      Medically Ready for Discharge: Anticipated in 2-4 Days      Didi Butler PA-C  Advanced Heart Failure/Cardiology II Service  [facility], or pager [phone]      Patient discussed with Dr. Montgomery      60 minutes spent on the date of the encounter doing chart review, history and exam, documentation and further activities per the note    ================================================================    Subjective/24-Hr Events:   Last 24 hr care team notes reviewed.  Headache seemed to get a bit better last night, but now worsening again. He is having some constipation but that also improved after mirilax yesterday. He is having dizziness which he stands up and feels RESENDIZ if he walks very far. No fevers or chills. No nausea or vomiting. No dierahea. No swelling in his legs or stomach.     ROS:  4 point ROS including respiratory, CV, GI and  (other than that noted in the HPI) is negative.     Medications: Reviewed in EPIC.     Physical Exam:   BP (!) 88/58   Pulse 59   Temp 97.8  F (36.6  C) (Oral)   Resp 16    Wt 89.8 kg (197 lb 14.4 oz)   SpO2 99%   BMI 30.09 kg/m      GENERAL: Appears comfortable, in no distress .  HEENT: Eye symmetrical, no discharge or icterus bilaterally.   NECK: Supple, JVD difficult to assess.   CV: RRR, +S1S2, no murmur, rub, or gallop.   RESPIRATORY: Respirations regular, even, and unlabored. Lungs CTA throughout.    GI: Soft and non distended   EXTREMITIES: No peripheral edema. All extremities are warm and well perfused  NEUROLOGIC: Alert and interacting appropriately. No focal deficits.   SKIN: No jaundice. No rashes or lesions.     Labs:  CMP  Recent Labs   Lab 05/24/24  0447 05/23/24  1626 05/23/24  0511 05/22/24  1721 05/22/24  0525 05/21/24  0535 05/20/24  1403    134* 137 136 135 137 135   POTASSIUM 5.0 4.7 4.3 4.4 4.2 4.1 5.1   CHLORIDE 103 102 104 102 103 103 101   CO2 21* 20* 21* 20* 19* 19* 19*   ANIONGAP 12 12 12 14 13 15 15   * 211* 99 99 105* 85 124*   BUN 62.5* 70.4* 70.4* 74.7* 80.1* 84.5* 84.9*   CR 2.92* 2.96* 2.86* 2.89* 2.87* 2.92* 3.04*   GFRESTIMATED 23* 23* 24* 23* 23* 23* 22*   QAMAR 9.1 9.0 8.9 9.1 8.6* 8.4* 8.8   MAG 2.5*  --  2.1  --  2.1 2.0 2.3   PHOS  --   --  4.6*  --   --   --   --    PROTTOTAL  --   --   --   --   --  6.1* 7.1   ALBUMIN  --   --   --   --   --  3.7 4.4   BILITOTAL  --   --   --   --   --  0.5 0.6   ALKPHOS  --   --   --   --   --  110 132   AST  --   --   --   --   --  34 48*   ALT  --   --   --   --   --  38 49       CBC  Recent Labs   Lab 05/24/24  0447 05/23/24  1026 05/23/24  1025 05/23/24  0511 05/22/24  0525 05/21/24  0535   WBC 5.9  --   --  5.5 5.7 7.5   RBC 3.69*  --   --  3.36* 3.49* 3.61*   HGB 11.0* 10.2* 10.4* 10.0* 10.3* 10.7*   HCT 34.9*  --   --  31.5* 32.6* 34.0*   MCV 95  --   --  94 93 94   MCH 29.8  --   --  29.8 29.5 29.6   MCHC 31.5  --   --  31.7 31.6 31.5   RDW 13.8  --   --  13.8 13.9 14.0     --   --  223 224 238       INR  Recent Labs   Lab 05/24/24  0447 05/23/24  1626 05/23/24  0511 05/22/24  0525    INR 1.26* 1.30* 1.29* 1.36*

## 2024-05-24 NOTE — PROGRESS NOTES
"ANTICOAGULATION  MANAGEMENT: NEW REFERRAL      SUBJECTIVE/OBJECTIVE     Jim Willingham, a 68 year old male  is newly referred to [facility].    Anticoagulation:    Previously on warfarin: Yes,  [id number].  Approximate previous dose: combination of 10mg and 7.5mg  Warfarin initiation date (approximate): 5/23/24   Indication(s): DVT and PE   Goal Range: 2.0-3.0   Anticoagulation Bridge/Overlap: No   Referring provider: from [facility] provider    General Dietary/Social Hx:   Upon discharge from the hospital    Results:        Recent labs: (last 7 days)     05/24/24  0447   INR 1.26*       Wt Readings from Last 2 Encounters:   05/24/24 89.8 kg (197 lb 14.4 oz)   03/21/24 88.5 kg (195 lb)      Estimated body mass index is 30.09 kg/m  as calculated from the following:    Height as of 1/3/24: 1.727 m (5' 8\").    Weight as of an earlier encounter on 5/24/24: 89.8 kg (197 lb 14.4 oz).  Lab Results   Component Value Date    AST 34 05/21/2024    ALT 38 05/21/2024    ALBUMIN 3.7 05/21/2024     Lab Results   Component Value Date    CR 2.92 (H) 05/24/2024     Estimated Creatinine Clearance: 27.1 mL/min (A) (based on SCr of 2.92 mg/dL (H)).    ASSESSMENT     Goal INR 2-3, standard for indication(s) above  Establishing initial warfarin maintenance dose (on warfarin < 30 days)   Factors that may increase sensitivity to warfarin: none  Factors that may reduce sensitivity to warfarin: Male Gender and Weight > 90 kg  Potential significant drug interactions with home medications: None noted      PLAN     Dosing given upon discharge            Standing orders placed in Epic: Point of Care INR (Lab 5000) and Venous INR  (Lab 3572)    Maru Alonso RN  Anticoagulation Clinic  5/24/2024                   "
English

## 2024-05-24 NOTE — PROGRESS NOTES
Nephrology progress note   05/24/2024      Jim Willingham MRN:[id number] YOB: 1955  Date of Admission:5/20/2024  Primary care provider: Ricardo Gómez  Requesting physician: Riaz Montaño MD    ASSESSMENT AND RECOMMENDATIONS:   Jim Willingham is a 69 yo male with a history of NICM (s/p HM2 LVAD 6/2017) s/p OHT 5/6/21, gastric bypass, CKD 3, DM type II, gout, and COPD who presented for RESENDIZ and was found to have a PE. Nephrology was consulted for WALTER.      WALTER on CKD stage 3b  CKD was diagnosed years ago; pt does not recall being told the etiology. May be due to pt's DM, HTN, or past cardiorenal from heart failure. Baseline Cr ~1.4-1.8. Cr 3 on admission. Initial workup with bland UA, no hydronephrosis on renal US, most recent tacrolimus level 4.8. New DSA noted, but RHC today reveal RA 10, wedge 15. Reported baseline weight 192-195 lbs. Cr improved to 2.8 with diuretics but has not improved further thus far. Pt was hypotensive with SBP 80s on 5/22 but Cr did not change much after this episode. Suspect initially prerenal/cardiorenal vs EMILY (with tacro use), then possible over-diuresis with Lasix.   At this point we still think the main mechanism behind his WALTER is impaired renal autoregulation on a background of pertinent cardiac HX and being chronically on tac   Spin urine, bland UA   Uric acid at  5.3  - Orthostatic testing for reported new lightheadedness   - Hold Lasix for today   -I ordered LDH, haptoglobin to rule out local TMA in his kidney though unlikely as HB and PLT are stable  -I also ordered low K diet as K is 5.0  -I ordered blood and urine BK viral levels (though unlikely WALTER is related to BK nephropathy given lack of hematuria)       Normocytic anemia, chronic  Baseline hgb ~11. Hgb 10 at the time of consult without overt signs of bleeding. Pt took venofir in the past.   - Ordered iron studies showing iron 16 and iron sat 5%  - Recommend IV iron infusion (ordered by primary  team)    Hypertension   SBP has been 100-110s. Not currently on any BP meds. Pt reports he has not needed HTN for quite some time.   - CTM    BMD  Mild hyperphosphatemia likely iso WALTER.  - CTM      Recommendations were communicated to primary team via verbal/note    Seen and discussed with Dr. Jigar Aburto MD         REASON FOR CONSULT: WALTER     HISTORY OF PRESENT ILLNESS:  Cr is about the same at 2.9  Still euvolemic   K is 5  On heparin drip   Weight is 89.5 to 89.8  No lasix yesterday or today    Since [place]      PAST MEDICAL HISTORY:  Reviewed with patient on 05/24/2024     Past Medical History:   Diagnosis Date    Bariatric surgery status 2003    Benign essential hypertension 05/11/2017    Bilateral carpal tunnel syndrome 11/02/2020    BPH with obstruction/lower urinary tract symptoms 03/21/2024    Cardiomyopathy, unspecified (H) 05/08/2017    CKD (chronic kidney disease) stage 3, GFR 30-59 ml/min (H) 05/11/2017    COPD (chronic obstructive pulmonary disease) (H) 11/02/2020    Depression 05/11/2017    Diabetes mellitus (H) 1995    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    Generalized osteoarthritis 09/26/2023    Gouty arthropathy, chronic, without tophi 11/02/2020    H/O adenomatous polyp of colon 08/03/2016    2 polyps    H/O gastric bypass 05/11/2017    History of type 2 diabetes mellitus 03/28/2023    Hyperlipidemia LDL goal <70 09/27/2022    ICD (implantable cardioverter-defibrillator), biventricular, in situ 05/11/2017    IFG (impaired fasting glucose) 09/26/2023    LVAD (left ventricular assist device) present (H)     Major depression, recurrent, chronic (H24) 11/02/2020    Mild anemia 03/18/2022    NICM (nonischemic cardiomyopathy) (H)/ EF 20% 05/11/2017    ECHO: LVEDd. 7.66 cm, Restrictive pattern , Severe mitral valve regurgitation    CECILIA (obstructive sleep apnea) 05/11/2017    Paroxysmal atrial fibrillation (H) 05/11/2017    Paroxysmal VT (H) 05/11/2017    Peripheral polyneuropathy  03/28/2023    Postsurgical dumping syndrome 03/21/2024    Pulmonary cavitary lesion 11/18/2021    Rotator cuff tear arthropathy of both shoulders 11/02/2020    Type 2 diabetes mellitus with diabetic polyneuropathy (H) 03/18/2022    Uncomplicated asthma     Vitamin B12 deficiency (non anemic) 05/11/2017       Past Surgical History:   Procedure Laterality Date    ANESTHESIA CARDIOVERSION N/A 05/11/2020    Procedure: ANESTHESIA, FOR CARDIOVERSION @1100;  Surgeon: GENERIC ANESTHESIA PROVIDER;  [facility]    BRONCHOSCOPY (RIGID OR FLEXIBLE), DIAGNOSTIC N/A 8/30/2021    Procedure: BRONCHOSCOPY, WITH BRONCHOALVEOLAR LAVAGE;  Surgeon: Perlman, David Morris, MD;  [facility]    COLONOSCOPY N/A 4/18/2024    Procedure: COLONOSCOPY, WITH POLYPECTOMY;  Surgeon: Jermaine Root MD;  [facility]    CV CORONARY ANGIOGRAM N/A 5/17/2022    Procedure: Coronary Angiogram;  Surgeon: Jeffrey Gibson MD;  Location:  [facility] CARDIAC CATH LAB    CV CORONARY ANGIOGRAM N/A 5/23/2023    Procedure: Coronary Angiogram;  Surgeon: Scout Robins MD;  [facility]    CV HEART BIOPSY N/A 5/13/2021    Procedure: Heart Biopsy;  Surgeon: Scout Robins MD;  [facility]    CV HEART BIOPSY N/A 5/20/2021    Procedure: Heart Biopsy;  Surgeon: Jeffrey Gibson MD;  [facility]    CV HEART BIOPSY N/A 5/27/2021    Procedure: Heart Biopsy;  Surgeon: Jac Dover MD;  [facility]    CV HEART BIOPSY N/A 6/7/2021    Procedure: CV HEART BIOPSY;  Surgeon: Jeffrey Gibson MD;  [facility]    CV HEART BIOPSY N/A 6/21/2021    Procedure: CV HEART BIOPSY;  Surgeon: Scout Robins MD;  [facility]    CV HEART BIOPSY N/A 7/5/2021    Procedure: CV HEART BIOPSY;  Surgeon: Jeffrey Gibson MD;  Location:  HEART CARDIAC CATH  LAB    CV HEART BIOPSY N/A 7/16/2021    Procedure: Heart Biopsy;  Surgeon: Amadeo Art MD;  [facility]    CV HEART BIOPSY N/A 8/5/2021    Procedure: Heart Biopsy;  Surgeon: Jeffrey Gibson MD;  [facility]    CV HEART BIOPSY N/A 8/31/2021    Procedure: Heart Cath Heart Biopsy;  Surgeon: Jeffrey Gibson MD;  [facility]    CV HEART BIOPSY N/A 9/21/2021    Procedure: CV HEART BIOPSY;  Surgeon: Jeffrey Gibson MD;  [facility]    CV HEART BIOPSY N/A 10/5/2021    Procedure: CV HEART BIOPSY;  Surgeon: Jeffrey Gibson MD;  [facility]    CV HEART BIOPSY N/A 11/4/2021    Procedure: CV HEART BIOPSY;  Surgeon: Nicola Seth MD;  [facility]    CV HEART BIOPSY N/A 5/17/2022    Procedure: Heart Biopsy;  Surgeon: Jeffrey Gibson MD;  [facility]    CV HEART BIOPSY N/A 5/23/2023    Procedure: Heart Biopsy;  Surgeon: Scout Robins MD;  [facility]    CV RIGHT HEART CATH MEASUREMENTS RECORDED N/A 07/24/2019    Procedure: CV RIGHT HEART CATH;  Surgeon: Renu Sears MD;  [facility]    CV RIGHT HEART CATH MEASUREMENTS RECORDED N/A 08/05/2020    Procedure: CV RIGHT HEART CATH;  Surgeon: Nicola Seth MD;  [facility]    CV RIGHT HEART CATH MEASUREMENTS RECORDED N/A 01/07/2021    Procedure: CV RIGHT HEART CATH;  Surgeon: Jac Dover MD;  [facility]    CV RIGHT HEART CATH MEASUREMENTS RECORDED N/A 02/23/2021    Procedure: Heart Cath Right Heart Cath;  Surgeon: Jeffrey Gibson MD;  [facility]    CV RIGHT HEART CATH MEASUREMENTS RECORDED N/A 03/23/2021    Procedure: Heart Cath Right Heart Cath. request for 3/23;  Surgeon:  Jeffrey Gibson MD;  Location:  [facility] CARDIAC CATH LAB    CV RIGHT HEART CATH MEASUREMENTS RECORDED N/A 5/13/2021    Procedure: Right Heart Cath;  Surgeon: Scout Robins MD;  Location:  [facility] CARDIAC CATH LAB    CV RIGHT HEART CATH MEASUREMENTS RECORDED N/A 5/20/2021    Procedure: Right Heart Cath;  Surgeon: Jeffrey Gibson MD;  Location:  [facility] CARDIAC CATH LAB    CV RIGHT HEART CATH MEASUREMENTS RECORDED N/A 5/27/2021    Procedure: Right Heart Cath;  Surgeon: Jac Dover MD;  Location:  HE[facility] CARDIAC CATH LAB    CV RIGHT HEART CATH MEASUREMENTS RECORDED N/A 6/7/2021    Procedure: CV RIGHT HEART CATH;  Surgeon: Jeffrey Gibson MD;  Location:  [facility] CARDIAC CATH LAB    CV RIGHT HEART CATH MEASUREMENTS RECORDED N/A 6/21/2021    Procedure: CV RIGHT HEART CATH;  Surgeon: Scout Robins MD;  Location:  [facility] CARDIAC CATH LAB    CV RIGHT HEART CATH MEASUREMENTS RECORDED N/A 7/5/2021    Procedure: CV RIGHT HEART CATH;  Surgeon: Jeffrey Gibson MD;  Location:  [facility] CARDIAC CATH LAB    CV RIGHT HEART CATH MEASUREMENTS RECORDED N/A 7/16/2021    Procedure: Right Heart Cath;  Surgeon: Amadeo Art MD;  Location:  HEART CARDIAC CATH LAB    CV RIGHT HEART CATH MEASUREMENTS RECORDED N/A 8/5/2021    Procedure: CV RIGHT HEART CATH;  Surgeon: Jeffrey Gibson MD;  Location:  [facility]ART CARDIAC CATH LAB    CV RIGHT HEART CATH MEASUREMENTS RECORDED N/A 8/31/2021    Procedure: Heart Cath Right Heart Cath;  Surgeon: Jeffrey Gibson MD;  Location:  [facility] CARDIAC CATH LAB    CV RIGHT HEART CATH MEASUREMENTS RECORDED N/A 9/21/2021    Procedure: CV RIGHT HEART CATH;  Surgeon: Jeffrey Gibson MD;  Location:  HEART CARDIAC CATH LAB    CV RIGHT HEART CATH MEASUREMENTS RECORDED N/A 10/5/2021    Procedure: CV RIGHT HEART CATH;  Surgeon: Jeffrey Gibson MD;  Location: [facility]  CARDIAC CATH LAB    CV RIGHT HEART CATH MEASUREMENTS RECORDED N/A 11/4/2021    Procedure: CV RIGHT HEART CATH;  Surgeon: Nicola Seth MD;  Location:  HEART CARDIAC CATH LAB    CV RIGHT HEART CATH MEASUREMENTS RECORDED N/A 5/17/2022    Procedure: Right Heart Catheterization;  Surgeon: Jeffrey Gibson MD;  Location:  HEART CARDIAC CATH LAB    CV RIGHT HEART CATH MEASUREMENTS RECORDED N/A 5/23/2023    Procedure: Right Heart Catheterization;  Surgeon: Scout Robins MD;  Location:  HEART CARDIAC CATH LAB    GI SURGERY  2003    [facility]    INSERT VENTRICULAR ASSIST DEVICE LEFT (HEARTMATE II) N/A 06/19/2017    Procedure: INSERT VENTRICULAR ASSIST DEVICE LEFT (HEARTMATE II);  Median Sternotomy Heartmate II Left Ventricular Assist Device Insertion on Pump Oxygenator;  Surgeon: Ronnie Quigley MD;  [facility]    IR CHEST TUBE PLACEMENT NON-TUNNELED RIGHT  7/11/2021    LAPAROSCOPY DIAGNOSTIC (GENERAL) N/A 1/4/2024    Procedure: Diagnostic Laparoscopy, Exploratory Laparotomy with Extensive lysis of adhesions.;  Surgeon: Frederick Montgomery MD;  Location:  OR    ORTHOPEDIC SURGERY  1994    right knee wired    PICC DOUBLE LUMEN PLACEMENT Right 09/23/2020    5FR PICC DL. Length-43cm (1cm out).    PICC INSERTION - DOUBLE LUMEN Right 05/09/2021    IK/BRACH    RELEASE CARPAL TUNNEL BILATERAL Bilateral 02/18/2021    Procedure: Bilateral carpal tunnel release;  Surgeon: Jermaine Brand MD;  Location:  OR    TRANSPLANT HEART RECIPIENT N/A 05/05/2021    Procedure: Redo median sternotomy, lysis of adhesions, heart transplant recipient, on cardiopulmonary bypass, intraoperative transesophageal echocardiogram per anesthesia, Implantable Cardioverter Defibrillator (ICD) removal;  Surgeon: Ronnie Quigley MD;  Location:  OR        MEDICATIONS:  PTA Meds  Prior to Admission medications    Medication Sig Last Dose Taking? Auth Provider Long Term End Date   acetaminophen (TYLENOL) 325 MG tablet Take 3  tablets (975 mg) by mouth every 8 hours as needed for mild pain 5/19/2024 at unknown Yes Frederick Montgomery MD No    albuterol (PROAIR HFA/PROVENTIL HFA/VENTOLIN HFA) 108 (90 Base) MCG/ACT inhaler Inhale 2 puffs into the lungs every 4 hours as needed for shortness of breath, wheezing or cough 5/20/2024 at am Yes Ricardo Gómez MD Yes    allopurinol (ZYLOPRIM) 300 MG tablet TAKE 1 TABLET BY MOUTH DAILY 5/20/2024 at am Yes Ricardo Gómez MD No    aspirin (ASA) 81 MG chewable tablet Take 1 tablet (81 mg) by mouth daily 5/20/2024 at am Yes Clara Valentin PA-C     buPROPion (WELLBUTRIN XL) 300 MG 24 hr tablet Take 1 tablet (300 mg) by mouth every morning 5/20/2024 at am Yes Ricardo Gómez MD Yes    cyanocobalamin (VITAMIN B-12) 1000 MCG tablet Take 1,000 mcg by mouth daily Unknown at unknown Yes Unknown, Entered By History     diphenhydrAMINE (BENADRYL) 25 MG tablet Take 25 mg by mouth every 6 hours as needed for itching Past Week at unknown Yes Reported, Patient No    gabapentin (NEURONTIN) 300 MG capsule Take 300 mg by mouth daily 5/20/2024 at am Yes Reported, Patient No    ipratropium - albuterol 0.5 mg/2.5 mg/3 mL (DUONEB) 0.5-2.5 (3) MG/3ML neb solution Take 1 vial by nebulization 4 times daily as needed for shortness of breath, wheezing or cough Unknown at unknown Yes Reported, Patient No    Melatonin 10 MG CAPS Take 1 capsule by mouth nightly as needed Past Week at unknown Yes Reported, Patient     montelukast (SINGULAIR) 10 MG tablet TAKE 1 TABLET BY MOUTH AT  BEDTIME 5/19/2024 at pm Yes Ricardo Gómez MD Yes    mycophenolate (GENERIC EQUIVALENT) 250 MG capsule TAKE one CAPSULE BY MOUTH TWICE A DAY 5/20/2024 at am Yes Delisa Montgomery MD Yes    polyethylene glycol (MIRALAX) 17 GM/Dose powder Take 1 Capful by mouth daily as needed for constipation Unknown at unknown Yes Reported, Patient     tacrolimus (GENERIC EQUIVALENT) 0.5 MG capsule Take 0.5 mg by mouth every morning With 3 mg capsules  5/20/2024 at am Yes Delisa Montgomery MD     tacrolimus (GENERIC EQUIVALENT) 1 MG capsule TAKE 3 CAPSULES BY MOUTh IN am and in pm. 5/20/2024 at am Yes Delisa Montgomery MD     traMADol (ULTRAM) 50 MG tablet Take 50 mg by mouth every 6 hours as needed for severe pain Unknown at unknown Yes Reported, Patient     VITAMIN D PO Take 1 capsule by mouth daily 5/20/2024 at am Yes Reported, Patient     rosuvastatin (CRESTOR) 10 MG tablet Take 1 tablet (10 mg) by mouth daily   Ricardo Gómez MD Yes       Current Meds  Current Facility-Administered Medications   Medication Dose Route Frequency Provider Last Rate Last Admin    allopurinol (ZYLOPRIM) tablet 300 mg  300 mg Oral Daily Emmanuel Hernandez MD   300 mg at 05/24/24 0917    aspirin (ASA) chewable tablet 81 mg  81 mg Oral Daily Emmanuel Hernandez MD   81 mg at 05/24/24 0917    buPROPion (WELLBUTRIN XL) 24 hr tablet 300 mg  300 mg Oral QAM Emmanuel Hernandez MD   300 mg at 05/24/24 0917    iron sucrose (VENOFER) 200 mg in sodium chloride 0.9 % 120 mL intermittent infusion  200 mg Intravenous Q24H Didi Butler PA-C 480 mL/hr at 05/24/24 1035 200 mg at 05/24/24 1035    melatonin tablet 10 mg  10 mg Oral At Bedtime Mirian Oviedo MD   10 mg at 05/23/24 2249    montelukast (SINGULAIR) tablet 10 mg  10 mg Oral At Bedtime Emmanuel Hernandez MD   10 mg at 05/23/24 2256    mycophenolate (GENERIC EQUIVALENT) capsule 500 mg  500 mg Oral BID IS Didi Butler PA-C   500 mg at 05/24/24 0917    polyethylene glycol (MIRALAX) Packet 17 g  17 g Oral Daily Renata Cespedes MD   17 g at 05/24/24 0917    rosuvastatin (CRESTOR) tablet 10 mg  10 mg Oral Daily Emmanuel Hernandez MD   10 mg at 05/24/24 0917    tacrolimus (GENERIC EQUIVALENT) capsule 3 mg  3 mg Oral QPM Emmanuel Hernandez MD   3 mg at 05/23/24 1821    tacrolimus (GENERIC EQUIVALENT) capsule 3.5 mg  3.5 mg Oral Emmanuel Rawls MD   3.5 mg at 05/24/24 0917    Warfarin Dose Required Daily - Pharmacist Managed  1 each Oral  See Admin Instructions Didi Butler PA-C         Infusion Meds  Current Facility-Administered Medications   Medication Dose Route Frequency Provider Last Rate Last Admin    heparin 25,000 units in 0.45% NaCl 250 mL ANTICOAGULANT infusion  0-5,000 Units/hr Intravenous Continuous Didi Butler PA-C 16 mL/hr at 24 1011 1,600 Units/hr at 24 1011    Reason ACE/ARB/ARNI order not selected   Other DOES NOT GO TO Danay Perez MD        Reason beta blocker not prescribed   Does not apply DOES NOT GO TO Danay Perez MD           ALLERGIES:    Allergies   Allergen Reactions    Grass Shortness Of Breath    Ace Inhibitors Cough    Cats     Dust Mites Other (See Comments)     Asthma    Mold Other (See Comments)     Asthma    Penicillins Other (See Comments)     Unknown - childhood exposure    Tolerated Zosyn -2020    Per patient report he has tolerated amoxicillin    Sulfa Antibiotics Other (See Comments) and Unknown     Unknown childhood reaction       REVIEW OF SYSTEMS:  A comprehensive of systems was negative except as noted above.    SOCIAL HISTORY:   Social History     Socioeconomic History    Marital status:      Spouse name: Not on file    Number of children: Not on file    Years of education: Not on file    Highest education level: Not on file   Occupational History    Not on file   Tobacco Use    Smoking status: Former     Current packs/day: 0.00     Types: Cigarettes     Quit date:      Years since quittin.4     Passive exposure: Never    Smokeless tobacco: Never   Vaping Use    Vaping status: Never Used   Substance and Sexual Activity    Alcohol use: No    Drug use: No    Sexual activity: Yes     Partners: Female   Other Topics Concern    Parent/sibling w/ CABG, MI or angioplasty before 65F 55M? No   Social History Narrative    Not on file     Social Determinants of Health     Financial Resource Strain: High Risk (2023)    Financial  Resource Strain     Within the past 12 months, have you or your family members you live with been unable to get utilities (heat, electricity) when it was really needed?: Yes   Food Insecurity: Low Risk  (2023)    Food Insecurity     Within the past 12 months, did you worry that your food would run out before you got money to buy more?: No     Within the past 12 months, did the food you bought just not last and you didn t have money to get more?: No   Transportation Needs: Low Risk  (2023)    Transportation Needs     Within the past 12 months, has lack of transportation kept you from medical appointments, getting your medicines, non-medical meetings or appointments, work, or from getting things that you need?: No   Physical Activity: Not on file   Stress: Not on file   Social Connections: Not on file   Interpersonal Safety: Low Risk  (2023)    Interpersonal Safety     Do you feel physically and emotionally safe where you currently live?: Yes     Within the past 12 months, have you been hit, slapped, kicked or otherwise physically hurt by someone?: No     Within the past 12 months, have you been humiliated or emotionally abused in other ways by your partner or ex-partner?: No   Housing Stability: Low Risk  (2023)    Housing Stability     Do you have housing? : Yes     Are you worried about losing your housing?: No       FAMILY MEDICAL HISTORY:   Family History   Problem Relation Age of Onset    Cerebrovascular Disease Mother 64    Diabetes Mother     Hypertension Mother     Coronary Artery Disease Father     Diabetes Type 2  Father     Obesity Brother     Obesity Brother     Cerebrovascular Disease Daughter 40       PHYSICAL EXAM:   Temp  Av.2  F (36.8  C)  Min: 97.4  F (36.3  C)  Max: 98.8  F (37.1  C)      Pulse  Av.6  Min: 54  Max: 104 Resp  Av.8  Min: 16  Max: 20  SpO2  Av.1 %  Min: 97 %  Max: 100 %       BP (!) 88/58   Pulse 59   Temp 97.8  F (36.6  C) (Oral)   Resp 16    Wt 89.8 kg (197 lb 14.4 oz)   SpO2 99%   BMI 30.09 kg/m       General Appearance: NAD, lying comfortably in bed  Respiratory: CTA bilaterally without crackles or wheezes   Cardiovascular: RRR without murmurs  GI: Soft, nontender, nondistended, normal active bowel sounds  Ext: No BLE   Neuro: A&Ox3  Skin: No rashes or lesions    Date 05/23/24 0700 - 05/24/24 0659   Shift 7970-8038 6557-0188 0492-1840 24 Hour Total   INTAKE   P.O. 555   555   Shift Total(mL/kg) 555(6.2)   555(6.2)   OUTPUT   Urine 200   200   Shift Total(mL/kg) 200(2.23)   200(2.23)   Weight (kg) 89.5 89.5 89.5 89.5      Admit Weight: 91.4 kg (201 lb 9.6 oz)       LABS:   I have reviewed the following labs:  CMP  Recent Labs   Lab 05/24/24  0447 05/23/24  1626 05/23/24  0511 05/22/24  1721 05/22/24  0525 05/21/24  0535 05/20/24  1403    134* 137 136 135 137 135   POTASSIUM 5.0 4.7 4.3 4.4 4.2 4.1 5.1   CHLORIDE 103 102 104 102 103 103 101   CO2 21* 20* 21* 20* 19* 19* 19*   ANIONGAP 12 12 12 14 13 15 15   * 211* 99 99 105* 85 124*   BUN 62.5* 70.4* 70.4* 74.7* 80.1* 84.5* 84.9*   CR 2.92* 2.96* 2.86* 2.89* 2.87* 2.92* 3.04*   GFRESTIMATED 23* 23* 24* 23* 23* 23* 22*   QAMAR 9.1 9.0 8.9 9.1 8.6* 8.4* 8.8   MAG 2.5*  --  2.1  --  2.1 2.0 2.3   PHOS  --   --  4.6*  --   --   --   --    PROTTOTAL  --   --   --   --   --  6.1* 7.1   ALBUMIN  --   --   --   --   --  3.7 4.4   BILITOTAL  --   --   --   --   --  0.5 0.6   ALKPHOS  --   --   --   --   --  110 132   AST  --   --   --   --   --  34 48*   ALT  --   --   --   --   --  38 49     CBC  Recent Labs   Lab 05/24/24  0447 05/23/24  1026 05/23/24  1025 05/23/24  0511 05/22/24  0525 05/21/24  0535   HGB 11.0* 10.2* 10.4* 10.0* 10.3* 10.7*   WBC 5.9  --   --  5.5 5.7 7.5   RBC 3.69*  --   --  3.36* 3.49* 3.61*   HCT 34.9*  --   --  31.5* 32.6* 34.0*   MCV 95  --   --  94 93 94   MCH 29.8  --   --  29.8 29.5 29.6   MCHC 31.5  --   --  31.7 31.6 31.5   RDW 13.8  --   --  13.8 13.9 14.0   PLT  223  --   --  223 224 238     INR  Recent Labs   Lab 05/24/24  0447 05/23/24  2057 05/23/24  1626 05/23/24  0511 05/22/24  0525   INR 1.26*  --  1.30* 1.29* 1.36*   PTT  --  87*  --   --   --      ABGNo lab results found in last 7 days.   URINE STUDIES  Recent Labs   Lab Test 05/21/24  1306 05/20/24  1351 03/21/24  1007 01/06/24  1351   COLOR Light Yellow Straw Yellow Light Yellow   APPEARANCE Clear Clear Clear Clear   URINEGLC Negative Negative Negative Negative   URINEBILI Negative Negative Negative Negative   URINEKETONE Trace* Negative Negative Negative   SG 1.015 1.007 1.015 1.017   UBLD Negative Negative Trace* Moderate*   URINEPH 5.5 6.5 5.5 5.5   PROTEIN Negative Negative Trace* 50*   UROBILINOGEN  --   --  0.2  --    NITRITE Negative Negative Negative Negative   LEUKEST Negative Negative Trace* Negative   RBCU <1 0 0-2 1   WBCU <1 <1 5-10* 1     No lab results found.  PTH  Recent Labs   Lab Test 03/28/23  1129   PTHI 529*     IRON STUDIES  Recent Labs   Lab Test 05/23/24  0511 08/05/21  0931 05/13/21  0534 02/09/21  0518 01/21/21  1350 09/10/20  0830 09/19/19  1129 07/31/19  1050 07/03/19  0828 11/20/18  0934 10/19/18  1007 07/06/18  0856 05/24/17  0652   IRON 16* 30* 38 28* 36 29* 139 40 51 48 43 34* 63    240 215* 285 381 294 348 367 367 353 341 430 248   IRONSAT 5* 13* 18 10* 10* 10* 40 11* 14* 14* 13* 8* 25   VITA 24* 98 98 33  --   --   --  41 46 71  --  45 200       IMAGING:  US renal  IMPRESSION: No hydronephrosis. Simple left renal cyst.      William Aburto MD

## 2024-05-24 NOTE — PROGRESS NOTES
Care Management Follow Up    CHW scheduled INR lab Tuesday 5/28 at 5pm at [facility] with Dr. Montgomery. [facility] # [phone] spoke to Cherie. Transferred to [facility]- scheduled PCP appt Thursday 5/30 at 8:35am with Dr. Gómez at [address] [place] [address].   Was told they will reach out to provider's team and ask if they are able to schedule PCP appt Tuesday at same time as INR lab instead and they will call pt and reschedule PCP appt to Tuesday if so.       Noy Camacho   6A/B/C Community Health Worker   Phone: [phone]

## 2024-05-24 NOTE — PLAN OF CARE
Neuro: A&Ox4, very pleasant pt, able to make needs known.   Cardiac: SR.with rates . 1 episode of rates in the 50, possible variable p waves at that time? Primary team aware. Pt did have a drop in BP after standing.   Respiratory: Sating >94% on RA.  GI/: Adequate urine output. BM X1  Diet/appetite: Tolerating renal diet. Eating fair.   Activity:  Assist of 1, up to chair and in bathroom.   Pain: At acceptable level on current regimen. Pt having headache, mild and unchanged this shift.   Skin: No new deficits noted.  LDA's: PIV x2    Plan: Heparin drip infusion within therapeutic range. IV iron given without reaction. Pt receiving small IV bolus for orthostatic soft pressures with mild dizziness/weakness. Plan for MRI this afternoon, pt to get premed of ativan prior.  Continue with POC. Notify primary team with changes.

## 2024-05-24 NOTE — PLAN OF CARE
Goal Outcome Evaluation:    Hours of Care: 1900 to 0730    Neuro: A&O x4. Calls appropriately, able to make needs known. Pt c/o dizziness and lightheadedness.   Cardiac: Tele in place, SR w/ 1st degree AV block. Afebrile.   Resp: VSS on RA.   GI/: Voiding well via urinal. Pt had a BM this shift but still feeling constipated. PRN miralax given.   PRN: Tylenol and tramadol given for pain   Skin: New RIJ puncture site - WNL.   LDAs: L PIV in place infusing heparin @ 1600 units/hr - therapeutic. Next recheck am.  Activity: Up independently/SBA d/t dizziness.   Procedures:  RHC with biopsy completed yesterday..       Plan: Continue to monitor and follow POC. Plan for Head MRI today. Notify Cards 2 with changes.

## 2024-05-25 NOTE — PLAN OF CARE
Physical Therapy: Orders received. Chart reviewed and discussed with care team.? Physical Therapy not indicated due to pt mobilizing near functional mobility baseline and can have remaining mobility concerns addressed by OT per discussion with OT.? Defer discharge recommendations to OT.? Will complete orders.

## 2024-05-25 NOTE — PROGRESS NOTES
[facility]   Cardiology II Service / Advanced Heart Failure  Daily Progress Note      Patient: Jim Willingham  MRN: [id number]  Admission Date: 5/20/2024  Hospital Day # 5    Assessment and Plan:   Mr. Jim Willingham is a 68yr old male with a history of NICM (s/p HM2 LVAD 6/2017) s/p OHT 5/6/21, VT, AFib, CKD, DMII, and COPD who presented to the ED for increased SOB, where he was found to have a PE.        PLAN:  - EP consult today given symptomatic bradycardia and junctional rhythm  - encourage po intake  - repeat tacro level on Tuesday  - restart ca/vitamin D supplements  - CT c/a/p is in process  - IV iron per renal  - continue PT/OT      NICM, s/p OHT 5/6/21   His post-transplant course was c/b right pleural air leak (required prolonged chest tube), pAFib, CAP (RLL, 6/2021), recurrent pleural effusions (s/p thoracenteses x2 6/2021 and 7/2021, exudative, repeatedly cultured negative), RLL cavitary lesions (per chest CT 7/14/21, BD glucan indeterminate, aspergillus negative, not started on antifungals), pericardial effusion (1.4cm per TTE 7/12/21, conservatively managed), low-grade EBV viremia, recurrent/persistent gout flare, transaminase elevation with questionable choledocholithiasis (conservatively managed with resolution), COVID-19 (8/2021, s/p monoclonal antibodies and remdesivir x5 days), and 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    Rejection history:  none, last bx 5/23/24 was negative for ACR, AMR, and capillary staining  AlloMap scores:  < 35 (last 5/2023)  DSAs:  new DSAs noted 5/20/24 (DQB9, low-level)  Coronary angio/Ischemic eval:  Coronary angio 5/2023 showed minimal CAD (20% stenosis in RCA, stable from prior), and was negative for obstruction.  Last RHC:  5/2024 showed normal biventricular filling pressures with RA 10, mPA 25, PCW 15, and CI 2.48.  Echo/cMRI:  TTE 5/2024 showed stable graft function, with LVEF 55-60% and normal RV size/function.      Serostatus:  - CMV D+/R+  - EBV D+/R+  - Toxo D-/R-     Immunosuppression:  - MMF increased to 500mg twice daily on 5/23/24 due to new DSAs (PTA dose was 250mg BID, lowered dose due to multiple pneumonias)  - tacro, goal level 4-6.  Tacro level was 5 on 5/24 (10.5hr trough), will repeat level on Tuesday.     PPx:  - CAV:  Aspirin 81mg daily and rosuvastatin 10mg daily.  - GI: Pantoprazole 40mg daily  - Osteoporosis: restarting Calcium/vitamin D supplements, it does not appear that he was taking these PTA     Graft function:  - BPs: Controlled, not on antihypertensives  - fluid status: Euvolemic, now off diuretics and received some IVF 5/24 due to orthostasis, encouraging po intake      Episodic junctional rhythm  Bradycardia  Has demonstrated intermittent runs of junctional rhythms and bradycardia, with HRs in the 50-60s.  He is feeling some SOB, but otherwise remains stable.  BPs are stable.  - urgent EP consult   - zio patch at discharge    Orthostatic hypotension  sBP today went from 110s to 99.  He was not symptomatic.  - encourage po fluid intake    Pulmonary Embolism, low risk  Elevated D-Dimer 2.11  Started to dyspnea and fatigue ~10 days prior to admission, noting that he acutely could ambulate across the room without dyspnea.  In the ED, D-dimer was elevated, NTproBNP elevated at 18,909 (in the setting of WALTER), creat 3.  V/Q scan showed findings of wedge shaped defects in basilar segments of lower right lobe, concerning for pulmonary embolism as per PISAPED criteria.  TTE was negative for RV Dysfunction.  Denies prior hx of PE, DVT, and no imaging findings suggestive of DVT.   - continue High intensity heparin gtt   - warfarin started 5/23, will need to continue to bridge with heparin to a therapeutic INR.  Would continue heparin gtt until renal function improves to a point that we can safely use lovenox  - Will need minimum of 3 months of anticoagulation.  Pharmacy consulted for medication coverage, note  that apixaban and rivaroxaban have high co-pays, so will continue warfarin with goal INR 2-3.  - Recommend outpatient cancer screening given unprovoked PE  - Per Dr. Montaño, no need to consider more invasive intervention on the PE.    Headache  Has been having intermittent headaches over the last 10 days.  No neuro features, but it is not resolving and it has become more persistent, and is worse in the morning when he wakes up.  Brain MRI from 5/24 was negative for acute pathology.  - encourage po hydration  - continue prn tylenol    WALTER on CKD, improving  Baseline creat ~1.4.  On admission here, was 3s.  Initially felt to be cardiorenal d/t reported weight gain, but he did not demonstrate any significant improvement with IV lasix.  No protein in urine to suggest glomerular process.  FeURea suggests pre-renal cause.  Renal ultrasound was negative for hydronephrosis and obstruction.  Spun urine was bland per renal.  Felt to be less likely embolic d/t no wedge infarcts on CT imaging.  LDH WNL on 5/24, haptoglobin WNL on 5/25.  RHC showed euvolemia as noted above.  He has had some orthostasis, so received IVF on 5/24 and have encouraged po intake since.  - Appreciate renal consult  - encourage po hydration (last diuretic dose was 5/22)  - Trial of fluids today as above, no further diuretics planned (last dose 5/22)  - continue tacro as above  - renally dose meds, avoid nephrotoxic meds  - no current indication for renal biopsy     Normocytic Anemia  Baseline Hgb ~11, remains stable. Suspect anemia of renal disease  - Trend CBC  - IV iron course ordered for 5/24-5/28  - will consider referral to outpatient anemia clinic     Elevated Troponin  116 --> 112, suspect troponin leak in setting of PE and kidney injury.  However, given RV not showing any dysfunction hard to say if it is proportion or not to his PE severity.  Biopsy was negative for rejection.    Constipation  Having intermittent firm BMS.  - Mirilax daily  -  Ducolax suppository prn  - Has not felt that senna has been helpful      Medication induced antiplatelet effect - on pta aspirin  Immunocompromised Host - on tacrolimus and mmf  Prediabetes - A1c 5.8%        Diet: Regular adult diet, no fluid restriction   DVT Prophylaxis: Heparin SQ  Russell Catheter: Not present  Cardiac Monitoring: ACTIVE   Code Status: Full Code      Medically Ready for Discharge: Anticipated in 2-4 Days    ================================================================    Subjective/24-Hr Events:   Mr. Willingham states that he feels ok.  He continues to feel when he is bradycardic, noting that he feels headaches and some dizziness.  He is also SOB, which remains consistent since he presented.  He is walking some, but not much and feels weaker than his baseline.  He is tying to hydrate well, and does not feel fluid retention.  He denies chest pain, nausea, vomiting, diarrhea, and constipation.      Last 24 hr care team notes reviewed.  ROS:  4 point ROS including respiratory, CV, GI and  (other than that noted in the HPI) is negative.     Medications: Reviewed in EPIC.     Physical Exam:   /69 (BP Location: Left arm)   Pulse 100   Temp 98.1  F (36.7  C) (Oral)   Resp 18   Wt 91.1 kg (200 lb 14.4 oz)   SpO2 99%   BMI 30.55 kg/m      GENERAL: Appears comfortable, in no distress, sitting upright in bed.  HEENT: Eye symmetrical, no discharge or icterus bilaterally.   NECK: Supple, JVD does not appear elevated when sitting upright.   CV: RRR, +S1S2, no murmur, rub, or gallop.   RESPIRATORY: Respirations regular, even, and unlabored. Lungs CTA throughout.    GI: Soft and non distended   EXTREMITIES: No LE edema. All extremities are warm and well perfused  NEUROLOGIC: Alert and interacting appropriately. No focal deficits.   SKIN: No open lesions, rashes, or jaundice.    Labs:  CMP  Recent Labs   Lab 05/25/24  0435 05/24/24  1537 05/24/24  0447 05/23/24  1626 05/23/24  0511 05/22/24  1721  05/22/24  0525 05/21/24  0535 05/20/24  1403   * 135 136 134* 137   < > 135 137 135   POTASSIUM 4.5 4.9 5.0 4.7 4.3   < > 4.2 4.1 5.1   CHLORIDE 103 104 103 102 104   < > 103 103 101   CO2 19* 21* 21* 20* 21*   < > 19* 19* 19*   ANIONGAP 12 10 12 12 12   < > 13 15 15   * 75 145* 211* 99   < > 105* 85 124*   BUN 57.2* 61.2* 62.5* 70.4* 70.4*   < > 80.1* 84.5* 84.9*   CR 2.85* 3.05* 2.92* 2.96* 2.86*   < > 2.87* 2.92* 3.04*   GFRESTIMATED 24* 22* 23* 23* 24*   < > 23* 23* 22*   QAMAR 8.9 8.6* 9.1 9.0 8.9   < > 8.6* 8.4* 8.8   MAG 2.3  --  2.5*  --  2.1  --  2.1 2.0 2.3   PHOS  --   --   --   --  4.6*  --   --   --   --    PROTTOTAL  --   --   --   --   --   --   --  6.1* 7.1   ALBUMIN  --   --   --   --   --   --   --  3.7 4.4   BILITOTAL  --   --   --   --   --   --   --  0.5 0.6   ALKPHOS  --   --   --   --   --   --   --  110 132   AST  --   --   --   --   --   --   --  34 48*   ALT  --   --   --   --   --   --   --  38 49    < > = values in this interval not displayed.       CBC  Recent Labs   Lab 05/25/24  0435 05/24/24  0447 05/23/24  1026 05/23/24  1025 05/23/24  0511 05/22/24  0525   WBC 5.3 5.9  --   --  5.5 5.7   RBC 3.25* 3.69*  --   --  3.36* 3.49*   HGB 9.6* 11.0* 10.2* 10.4* 10.0* 10.3*   HCT 30.9* 34.9*  --   --  31.5* 32.6*   MCV 95 95  --   --  94 93   MCH 29.5 29.8  --   --  29.8 29.5   MCHC 31.1* 31.5  --   --  31.7 31.6   RDW 13.6 13.8  --   --  13.8 13.9    223  --   --  223 224       INR  Recent Labs   Lab 05/25/24  0435 05/24/24  0447 05/23/24  1626 05/23/24  0511   INR 1.40* 1.26* 1.30* 1.29*       The above was reviewed with Dr. Montgomery.      Shelia Means, JOSE, [facility]-BC  Advanced Heart Failure Nurse Practitioner  [facility] [place]

## 2024-05-25 NOTE — PROGRESS NOTES
Nephrology progress note   05/25/2024      Jim Willingham MRN:[id number] YOB: 1955  Date of Admission:5/20/2024  Primary care provider: Ricardo Gómez  Requesting physician: Riaz Montaño MD    ASSESSMENT AND RECOMMENDATIONS:   Jim Willingham is a 69 yo male with a history of NICM (s/p HM2 LVAD 6/2017) s/p OHT 5/6/21, gastric bypass, CKD 3, DM type II, gout, and COPD who presented for RESENDIZ and was found to have a PE. Nephrology was consulted for WALTER.      WALTER on CKD stage 3b  CKD was diagnosed years ago; pt does not recall being told the etiology. May be due to pt's DM, HTN, or past cardiorenal from heart failure. Baseline Cr ~1.4-1.8. Cr 3 on admission. Initial workup with bland UA, no hydronephrosis on renal US, most recent tacrolimus level 4.8. New DSA noted, but RHC today reveal RA 10, wedge 15. Reported baseline weight 192-195 lbs. Cr improved to 2.8 with diuretics but has not improved further thus far. Pt was hypotensive with SBP 80s on 5/22 but Cr did not change much after this episode. Suspect initially prerenal/cardiorenal vs EMILY (with tacro use), then possible over-diuresis with Lasix.   At this point we still think the main mechanism behind his WALTER is impaired renal autoregulation on a background of pertinent cardiac HX and being chronically on tac   Spin urine, bland UA   Uric acid at  5.3  - Orthostatic testing is positive   - Hold Lasix for today   Negative  LDH and haptoglobin (ordered to rule out local TMA in his kidney though unlikely as HB and PLT are stable)  -C/w low K diet ,  K down to 4.5  -follow-up blood and urine BK viral levels (though unlikely WALTER is related to BK nephropathy given lack of hematuria)       # Status post OHT on 5/2021, history of NICM  # Chronic immunosuppression  DSAs:  NEW DSA noted this admission   -follow-up heart biopsy results     Normocytic anemia, chronic  Baseline hgb ~11. Hgb 10 at the time of consult without overt signs of bleeding. Pt took  venofir in the past.   - Ordered iron studies showing iron 16 and iron sat 5%  - Recommend IV iron infusion (ordered by primary team)    Hypertension (soft bp this admission)  SBP has been 100-110s. Not currently on any BP meds. Pt reports he has not needed HTN for quite some time.   - CTM    BMD  Mild hyperphosphatemia likely iso WALTER.  - CTM      Recommendations were communicated to primary team via verbal/note    Seen and discussed with Dr. Renny Aburto MD         REASON FOR CONSULT: WALTER     HISTORY OF PRESENT ILLNESS:  Cr is about the same at 2.9  Still euvolemic   K is 5  On heparin drip   Weight is 89.5 to 89.8 to 91.1  No lasix yesterday or today    Since MN      PAST MEDICAL HISTORY:  Reviewed with patient on 05/25/2024     Past Medical History:   Diagnosis Date    Bariatric surgery status 2003    Benign essential hypertension 05/11/2017    Bilateral carpal tunnel syndrome 11/02/2020    BPH with obstruction/lower urinary tract symptoms 03/21/2024    Cardiomyopathy, unspecified (H) 05/08/2017    CKD (chronic kidney disease) stage 3, GFR 30-59 ml/min (H) 05/11/2017    COPD (chronic obstructive pulmonary disease) (H) 11/02/2020    Depression 05/11/2017    Diabetes mellitus (H) 1995    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    Generalized osteoarthritis 09/26/2023    Gouty arthropathy, chronic, without tophi 11/02/2020    H/O adenomatous polyp of colon 08/03/2016    2 polyps    H/O gastric bypass 05/11/2017    History of type 2 diabetes mellitus 03/28/2023    Hyperlipidemia LDL goal <70 09/27/2022    ICD (implantable cardioverter-defibrillator), biventricular, in situ 05/11/2017    IFG (impaired fasting glucose) 09/26/2023    LVAD (left ventricular assist device) present (H)     Major depression, recurrent, chronic (H24) 11/02/2020    Mild anemia 03/18/2022    NICM (nonischemic cardiomyopathy) (H)/ EF 20% 05/11/2017    ECHO: LVEDd. 7.66 cm, Restrictive pattern , Severe mitral valve  regurgitation    CECILIA (obstructive sleep apnea) 05/11/2017    Paroxysmal atrial fibrillation (H) 05/11/2017    Paroxysmal VT (H) 05/11/2017    Peripheral polyneuropathy 03/28/2023    Postsurgical dumping syndrome 03/21/2024    Pulmonary cavitary lesion 11/18/2021    Rotator cuff tear arthropathy of both shoulders 11/02/2020    Type 2 diabetes mellitus with diabetic polyneuropathy (H) 03/18/2022    Uncomplicated asthma     Vitamin B12 deficiency (non anemic) 05/11/2017       Past Surgical History:   Procedure Laterality Date    ANESTHESIA CARDIOVERSION N/A 05/11/2020    Procedure: ANESTHESIA, FOR CARDIOVERSION @1100;  Surgeon: GENERIC ANESTHESIA PROVIDER;  Location: [facility]    BRONCHOSCOPY (RIGID OR FLEXIBLE), DIAGNOSTIC N/A 8/30/2021    Procedure: BRONCHOSCOPY, WITH BRONCHOALVEOLAR LAVAGE;  Surgeon: Perlman, David Morris, MD;  Location: [facility]    COLONOSCOPY N/A 4/18/2024    Procedure: COLONOSCOPY, WITH POLYPECTOMY;  Surgeon: Jermaine Root MD;  Location: [facility]    CV CORONARY ANGIOGRAM N/A 5/17/2022    Procedure: Coronary Angiogram;  Surgeon: Jeffrey Gibson MD;  Location:  [facility] CARD[facility] CATH LAB    CV CORONARY ANGIOGRAM N/A 5/23/2023    Procedure: Coronary Angiogram;  Surgeon: Scout Robins MD;  Location:  [facility]ARD[facility] CATH [facility]    CV HEART BIOPSY N/A 5/13/2021    Procedure: Heart Biopsy;  Surgeon: Scout Robins MD;  Location:  [facility]ARD[facility] CATH [facility]    CV HEART BIOPSY N/A 5/20/2021    Procedure: Heart Biopsy;  Surgeon: Jeffrey Gibson MD;  Location:  [facility]ARD[facility] CATH [facility]    CV HEART BIOPSY N/A 5/27/2021    Procedure: Heart Biopsy;  Surgeon: Jac Dover MD;  Location:  HE[facility] CARDIAC CATH LAB    CV HEART BIOPSY N/A 6/7/2021    Procedure: CV HEART BIOPSY;  Surgeon: Jeffrey Gibson MD;  Location:  HE[facility] CARDIAC CATH LAB    CV HEART BIOPSY N/A 6/21/2021    Procedure: CV HEART BIOPSY;  Surgeon: Scout Robins MD;  [facility]    CV HEART BIOPSY N/A 7/5/2021    Procedure: CV HEART BIOPSY;  Surgeon: Jeffrey Gibson MD;  [facility]    CV HEART BIOPSY N/A 7/16/2021    Procedure: Heart Biopsy;  Surgeon: Amadeo Art MD;  [facility]    CV HEART BIOPSY N/A 8/5/2021    Procedure: Heart Biopsy;  Surgeon: Jeffrey Gibson MD;  [facility]    CV HEART BIOPSY N/A 8/31/2021    Procedure: Heart Cath Heart Biopsy;  Surgeon: Jeffrey Gibson MD;  Location:  [facility]ARD[facility] CATH [facility]    CV HEART BIOPSY N/A 9/21/2021    Procedure: CV HEART BIOPSY;  Surgeon: Jeffrey Gibson MD;  Location:  [facility] CARD[facility] CATH LAB    CV HEART BIOPSY N/A 10/5/2021    Procedure: CV HEART BIOPSY;  Surgeon: Jeffrey Gibson MD;  Location:  [facility] CARD[facility] CATH LAB    CV HEART BIOPSY N/A 11/4/2021    Procedure: CV HEART BIOPSY;  Surgeon: Nicola Seth MD;  Location:  [facility] CARD[facility] CATH LAB    CV HEART BIOPSY N/A 5/17/2022    Procedure: Heart Biopsy;  Surgeon: Jeffrey Gibson MD;  Location:  [facility] CARD[facility] CATH [facility]    CV HEART BIOPSY N/A 5/23/2023    Procedure: Heart Biopsy;  Surgeon: Scout Robins MD;  Location:  [facility] CARD[facility] CATH LAB    CV HEART BIOPSY N/A 5/23/2024    Procedure: Heart Biopsy;  Surgeon: Precious Bautista MD;  Location:  HE[facility] CARD[facility] CATH LAB    CV RIGHT HEART CATH MEASUREMENTS RECORDED N/A 07/24/2019    Procedure: CV RIGHT HEART CATH;  Surgeon: Renu Sears MD;  Location:  [facility]ARD[facility] CATH LAB    CV RIGHT HEART CATH MEASUREMENTS RECORDED N/A 08/05/2020    Procedure: CV RIGHT HEART CATH;  Surgeon: Nicola Seth MD;  Location:  [facility]ARD[facility] CATH LAB    CV RIGHT HEART CATH MEASUREMENTS RECORDED N/A 01/07/2021    Procedure: CV RIGHT HEART CATH;  Surgeon: Jac Dover MD;  Location:  HE[facility] CARDIAC CATH LAB    CV RIGHT HEART CATH  MEASUREMENTS RECORDED N/A 02/23/2021    Procedure: Heart Cath Right Heart Cath;  Surgeon: Jeffrey Gibson MD;  [facility]    CV RIGHT HEART CATH MEASUREMENTS RECORDED N/A 03/23/2021    Procedure: Heart Cath Right Heart Cath. request for 3/23;  Surgeon: Jeffrey Gibson MD;  [facility]    CV RIGHT HEART CATH MEASUREMENTS RECORDED N/A 5/13/2021    Procedure: Right Heart Cath;  Surgeon: Scout Robins MD;  Location:  [facility] CARD[facility] CATH LAB    CV RIGHT HEART CATH MEASUREMENTS RECORDED N/A 5/20/2021    Procedure: Right Heart Cath;  Surgeon: Jeffrey Gibson MD;  Location:  [facility] CARD[facility] CATH LAB    CV RIGHT HEART CATH MEASUREMENTS RECORDED N/A 5/27/2021    Procedure: Right Heart Cath;  Surgeon: Jac Dover MD;  Location:  HE[facility] CARD[facility]AC CATH LAB    CV RIGHT HEART CATH MEASUREMENTS RECORDED N/A 6/7/2021    Procedure: CV RIGHT HEART CATH;  Surgeon: Jeffrey Gibson MD;  Location:  [facility] CARD[facility] CATH LAB    CV RIGHT HEART CATH MEASUREMENTS RECORDED N/A 6/21/2021    Procedure: CV RIGHT HEART CATH;  Surgeon: Scout Robins MD;  Location:  HE[facility] CARDI[facility] CATH LAB    CV RIGHT HEART CATH MEASUREMENTS RECORDED N/A 7/5/2021    Procedure: CV RIGHT HEART CATH;  Surgeon: Jeffrey Gibson MD;  Location:  HE[facility] CARDI[facility] CATH LAB    CV RIGHT HEART CATH MEASUREMENTS RECORDED N/A 7/16/2021    Procedure: Right Heart Cath;  Surgeon: Amadeo Art MD;  Location:  HE[facility] CARD[facility] CATH LAB    CV RIGHT HEART CATH MEASUREMENTS RECORDED N/A 8/5/2021    Procedure: CV RIGHT HEART CATH;  Surgeon: Jeffrey Gibson MD;  Location:  [facility] CARD[facility] CATH LAB    CV RIGHT HEART CATH MEASUREMENTS RECORDED N/A 8/31/2021    Procedure: Heart Cath Right Heart Cath;  Surgeon: Jeffrey Gibson MD;  [facility]    CV RIGHT HEART CATH MEASUREMENTS RECORDED  N/A 9/21/2021    Procedure: CV RIGHT HEART CATH;  Surgeon: Jeffrey Gibson MD;  Location:  [facility]ARD[facility] CATH LAB    CV RIGHT HEART CATH MEASUREMENTS RECORDED N/A 10/5/2021    Procedure: CV RIGHT HEART CATH;  Surgeon: Jeffrey Gibson MD;  Location:  [facility]ARD[facility] CATH [facility]    CV RIGHT HEART CATH MEASUREMENTS RECORDED N/A 11/4/2021    Procedure: CV RIGHT HEART CATH;  Surgeon: Nicola Seth MD;  Location:  [facility] CARD[facility] CATH LAB    CV RIGHT HEART CATH MEASUREMENTS RECORDED N/A 5/17/2022    Procedure: Right Heart Catheterization;  Surgeon: Jeffrey Gibson MD;  Location:  [facility]ARD[facility] CATH [facility]    CV RIGHT HEART CATH MEASUREMENTS RECORDED N/A 5/23/2023    Procedure: Right Heart Catheterization;  Surgeon: Scout Robins MD;  Location:  HE[facility] CARD[facility] CATH LAB    CV RIGHT HEART CATH MEASUREMENTS RECORDED N/A 5/23/2024    Procedure: Right Heart Cath- pre noon with rush path;  Surgeon: Precious Bautista MD;  Location:  HE[facility] CARDIAC CATH LAB    GI SURGERY  2003    [facility]    INSERT VENTRICULAR ASSIST DEVICE LEFT (HEARTMATE II) N/A 06/19/2017    Procedure: INSERT VENTRICULAR ASSIST DEVICE LEFT (HEARTMATE II);  Median Sternotomy Heartmate II Left Ventricular Assist Device Insertion on Pump Oxygenator;  Surgeon: Ronnie Quigley MD;  Location:  OR    IR CHEST TUBE PLACEMENT NON-TUNNELED RIGHT  7/11/2021    LAPAROSCOPY DIAGNOSTIC (GENERAL) N/A 1/4/2024    Procedure: Diagnostic Laparoscopy, Exploratory Laparotomy with Extensive lysis of adhesions.;  Surgeon: Frederick Montgomery MD;  Location:  OR    ORTHOPEDIC SURGERY  1994    right knee wired    PICC DOUBLE LUMEN PLACEMENT Right 09/23/2020    5FR PICC DL. Length-43cm (1cm out).    PICC INSERTION - DOUBLE LUMEN Right 05/09/2021    IK/BRACH    RELEASE CARPAL TUNNEL BILATERAL Bilateral 02/18/2021    Procedure: Bilateral carpal tunnel release;  Surgeon: Jermaine Brand MD;  Location:  OR    TRANSPLANT HEART RECIPIENT  N/A 05/05/2021    Procedure: Redo median sternotomy, lysis of adhesions, heart transplant recipient, on cardiopulmonary bypass, intraoperative transesophageal echocardiogram per anesthesia, Implantable Cardioverter Defibrillator (ICD) removal;  Surgeon: Ronnie Quigley MD;  Location: [facility]        MEDICATIONS:  PTA Meds  Prior to Admission medications    Medication Sig Last Dose Taking? Auth Provider Long Term End Date   acetaminophen (TYLENOL) 325 MG tablet Take 3 tablets (975 mg) by mouth every 8 hours as needed for mild pain 5/19/2024 at unknown Yes Frederick Montgomery MD No    albuterol (PROAIR HFA/PROVENTIL HFA/VENTOLIN HFA) 108 (90 Base) MCG/ACT inhaler Inhale 2 puffs into the lungs every 4 hours as needed for shortness of breath, wheezing or cough 5/20/2024 at am Yes Ricardo Gómez MD Yes    allopurinol (ZYLOPRIM) 300 MG tablet TAKE 1 TABLET BY MOUTH DAILY 5/20/2024 at am Yes Ricardo Gómez MD No    aspirin (ASA) 81 MG chewable tablet Take 1 tablet (81 mg) by mouth daily 5/20/2024 at am Yes Clara Valentin PA-C     buPROPion (WELLBUTRIN XL) 300 MG 24 hr tablet Take 1 tablet (300 mg) by mouth every morning 5/20/2024 at am Yes Ricardo Gómez MD Yes    cyanocobalamin (VITAMIN B-12) 1000 MCG tablet Take 1,000 mcg by mouth daily Unknown at unknown Yes Unknown, Entered By History     diphenhydrAMINE (BENADRYL) 25 MG tablet Take 25 mg by mouth every 6 hours as needed for itching Past Week at unknown Yes Reported, Patient No    gabapentin (NEURONTIN) 300 MG capsule Take 300 mg by mouth daily 5/20/2024 at am Yes Reported, Patient No    ipratropium - albuterol 0.5 mg/2.5 mg/3 mL (DUONEB) 0.5-2.5 (3) MG/3ML neb solution Take 1 vial by nebulization 4 times daily as needed for shortness of breath, wheezing or cough Unknown at unknown Yes Reported, Patient No    Melatonin 10 MG CAPS Take 1 capsule by mouth nightly as needed Past Week at unknown Yes Reported, Patient     montelukast (SINGULAIR) 10 MG tablet TAKE 1 TABLET  BY MOUTH AT  BEDTIME 5/19/2024 at pm Yes Ricardo Gómez MD Yes    mycophenolate (GENERIC EQUIVALENT) 250 MG capsule TAKE one CAPSULE BY MOUTH TWICE A DAY 5/20/2024 at am Yes Delisa Montgomery MD Yes    polyethylene glycol (MIRALAX) 17 GM/Dose powder Take 1 Capful by mouth daily as needed for constipation Unknown at unknown Yes Reported, Patient     tacrolimus (GENERIC EQUIVALENT) 0.5 MG capsule Take 0.5 mg by mouth every morning With 3 mg capsules 5/20/2024 at am Yes Delisa Montgomery MD     tacrolimus (GENERIC EQUIVALENT) 1 MG capsule TAKE 3 CAPSULES BY MOUTh IN am and in pm. 5/20/2024 at am Yes Delisa Montgomery MD     traMADol (ULTRAM) 50 MG tablet Take 50 mg by mouth every 6 hours as needed for severe pain Unknown at unknown Yes Reported, Patient     VITAMIN D PO Take 1 capsule by mouth daily 5/20/2024 at am Yes Reported, Patient     rosuvastatin (CRESTOR) 10 MG tablet Take 1 tablet (10 mg) by mouth daily   Ricardo Gómez MD Yes       Current Meds  Current Facility-Administered Medications   Medication Dose Route Frequency Provider Last Rate Last Admin    allopurinol (ZYLOPRIM) tablet 300 mg  300 mg Oral Daily Emmanuel Hernandez MD   300 mg at 05/25/24 0843    aspirin (ASA) chewable tablet 81 mg  81 mg Oral Daily Emmanuel Hernandez MD   81 mg at 05/25/24 0843    buPROPion (WELLBUTRIN XL) 24 hr tablet 300 mg  300 mg Oral QAM Emmanuel Hernandez MD   300 mg at 05/25/24 0843    iron sucrose (VENOFER) 200 mg in sodium chloride 0.9 % 120 mL intermittent infusion  200 mg Intravenous Q24H Didi Butler PA-C 480 mL/hr at 05/25/24 1013 200 mg at 05/25/24 1013    melatonin tablet 10 mg  10 mg Oral At Bedtime Mirian Oviedo MD   10 mg at 05/24/24 2231    montelukast (SINGULAIR) tablet 10 mg  10 mg Oral At Bedtime Emmanuel Hernandez MD   10 mg at 05/24/24 2231    mycophenolate (GENERIC EQUIVALENT) capsule 500 mg  500 mg Oral BID IS Didi Butler PA-C   500 mg at 05/25/24 0358    polyethylene glycol  (MIRALAX) Packet 17 g  17 g Oral Daily Renata Cespedes MD   17 g at 05/25/24 0845    rosuvastatin (CRESTOR) tablet 10 mg  10 mg Oral Daily Emmanuel Hernandez MD   10 mg at 05/25/24 0843    tacrolimus (GENERIC EQUIVALENT) capsule 3 mg  3 mg Oral QPM Emmanuel Hernandez MD   3 mg at 05/24/24 1924    tacrolimus (GENERIC EQUIVALENT) capsule 3.5 mg  3.5 mg Oral QAM Emmanuel Hernandez MD   3.5 mg at 05/25/24 0843    Warfarin Dose Required Daily - Pharmacist Managed  1 each Oral See Admin Instructions Didi Butler PA-C         Infusion Meds  Current Facility-Administered Medications   Medication Dose Route Frequency Provider Last Rate Last Admin    heparin 25,000 units in 0.45% NaCl 250 mL ANTICOAGULANT infusion  0-5,000 Units/hr Intravenous Continuous Didi Butler PA-C 16 mL/hr at 05/25/24 1044 1,600 Units/hr at 05/25/24 1044    Reason ACE/ARB/ARNI order not selected   Other DOES NOT GO TO Danay Perez MD        Reason beta blocker not prescribed   Does not apply DOES NOT GO TO Danay Perez MD           ALLERGIES:    Allergies   Allergen Reactions    Grass Shortness Of Breath    Ace Inhibitors Cough    Cats     Dust Mites Other (See Comments)     Asthma    Mold Other (See Comments)     Asthma    Penicillins Other (See Comments)     Unknown - childhood exposure    Tolerated Zosyn 9/18-9/20/2020    Per patient report he has tolerated amoxicillin    Sulfa Antibiotics Other (See Comments) and Unknown     Unknown childhood reaction       REVIEW OF SYSTEMS:  A comprehensive of systems was negative except as noted above.    SOCIAL HISTORY:   Social History     Socioeconomic History    Marital status:      Spouse name: Not on file    Number of children: Not on file    Years of education: Not on file    Highest education level: Not on file   Occupational History    Not on file   Tobacco Use    Smoking status: Former     Current packs/day: 0.00     Types: Cigarettes     Quit date: 1995     Years  since quittin.4     Passive exposure: Never    Smokeless tobacco: Never   Vaping Use    Vaping status: Never Used   Substance and Sexual Activity    Alcohol use: No    Drug use: No    Sexual activity: Yes     Partners: Female   Other Topics Concern    Parent/sibling w/ CABG, MI or angioplasty before 65F 55M? No   Social History Narrative    Not on file     Social Determinants of Health     Financial Resource Strain: High Risk (2023)    Financial Resource Strain     Within the past 12 months, have you or your family members you live with been unable to get utilities (heat, electricity) when it was really needed?: Yes   Food Insecurity: Low Risk  (2023)    Food Insecurity     Within the past 12 months, did you worry that your food would run out before you got money to buy more?: No     Within the past 12 months, did the food you bought just not last and you didn t have money to get more?: No   Transportation Needs: Low Risk  (2023)    Transportation Needs     Within the past 12 months, has lack of transportation kept you from medical appointments, getting your medicines, non-medical meetings or appointments, work, or from getting things that you need?: No   Physical Activity: Not on file   Stress: Not on file   Social Connections: Not on file   Interpersonal Safety: Low Risk  (2023)    Interpersonal Safety     Do you feel physically and emotionally safe where you currently live?: Yes     Within the past 12 months, have you been hit, slapped, kicked or otherwise physically hurt by someone?: No     Within the past 12 months, have you been humiliated or emotionally abused in other ways by your partner or ex-partner?: No   Housing Stability: Low Risk  (2023)    Housing Stability     Do you have housing? : Yes     Are you worried about losing your housing?: No       FAMILY MEDICAL HISTORY:   Family History   Problem Relation Age of Onset    Cerebrovascular Disease Mother 64    Diabetes Mother      Hypertension Mother     Coronary Artery Disease Father     Diabetes Type 2  Father     Obesity Brother     Obesity Brother     Cerebrovascular Disease Daughter 40       PHYSICAL EXAM:   Temp  Av.2  F (36.8  C)  Min: 97.4  F (36.3  C)  Max: 98.8  F (37.1  C)      Pulse  Av.6  Min: 54  Max: 104 Resp  Av.8  Min: 16  Max: 20  SpO2  Av.1 %  Min: 97 %  Max: 100 %       /69 (BP Location: Left arm)   Pulse 74   Temp 98.1  F (36.7  C) (Oral)   Resp 18   Wt 91.1 kg (200 lb 14.4 oz)   SpO2 99%   BMI 30.55 kg/m       General Appearance: NAD, lying comfortably in bed  Respiratory: CTA bilaterally without crackles or wheezes   Cardiovascular: RRR without murmurs  GI: Soft, nontender, nondistended, normal active bowel sounds  Ext: No BLE   Neuro: A&Ox3  Skin: No rashes or lesions    Date 24 07 - 24 0659   Shift 1230-7582 4000-2314 6801-8658 24 Hour Total   INTAKE   P.O. 555   555   Shift Total(mL/kg) 555(6.2)   555(6.2)   OUTPUT   Urine 200   200   Shift Total(mL/kg) 200(2.23)   200(2.23)   Weight (kg) 89.5 89.5 89.5 89.5      Admit Weight: 91.4 kg (201 lb 9.6 oz)       LABS:   I have reviewed the following labs:  CMP  Recent Labs   Lab 24  0435 24  1537 24  0447 24  1626 24  0511 24  1721 24  0525 24  0535 24  1403   * 135 136 134* 137   < > 135 137 135   POTASSIUM 4.5 4.9 5.0 4.7 4.3   < > 4.2 4.1 5.1   CHLORIDE 103 104 103 102 104   < > 103 103 101   CO2 19* 21* 21* 20* 21*   < > 19* 19* 19*   ANIONGAP 12 10 12 12 12   < > 13 15 15   * 75 145* 211* 99   < > 105* 85 124*   BUN 57.2* 61.2* 62.5* 70.4* 70.4*   < > 80.1* 84.5* 84.9*   CR 2.85* 3.05* 2.92* 2.96* 2.86*   < > 2.87* 2.92* 3.04*   GFRESTIMATED 24* 22* 23* 23* 24*   < > 23* 23* 22*   QAMAR 8.9 8.6* 9.1 9.0 8.9   < > 8.6* 8.4* 8.8   MAG 2.3  --  2.5*  --  2.1  --  2.1 2.0 2.3   PHOS  --   --   --   --  4.6*  --   --   --   --    PROTTOTAL  --   --   --   --    --   --   --  6.1* 7.1   ALBUMIN  --   --   --   --   --   --   --  3.7 4.4   BILITOTAL  --   --   --   --   --   --   --  0.5 0.6   ALKPHOS  --   --   --   --   --   --   --  110 132   AST  --   --   --   --   --   --   --  34 48*   ALT  --   --   --   --   --   --   --  38 49    < > = values in this interval not displayed.     CBC  Recent Labs   Lab 05/25/24 0435 05/24/24 0447 05/23/24  1026 05/23/24  1025 05/23/24  0511 05/22/24  0525   HGB 9.6* 11.0* 10.2* 10.4* 10.0* 10.3*   WBC 5.3 5.9  --   --  5.5 5.7   RBC 3.25* 3.69*  --   --  3.36* 3.49*   HCT 30.9* 34.9*  --   --  31.5* 32.6*   MCV 95 95  --   --  94 93   MCH 29.5 29.8  --   --  29.8 29.5   MCHC 31.1* 31.5  --   --  31.7 31.6   RDW 13.6 13.8  --   --  13.8 13.9    223  --   --  223 224     INR  Recent Labs   Lab 05/25/24 0435 05/24/24 0447 05/23/24  2057 05/23/24  1626 05/23/24  0511   INR 1.40* 1.26*  --  1.30* 1.29*   PTT  --   --  87*  --   --      ABGNo lab results found in last 7 days.   URINE STUDIES  Recent Labs   Lab Test 05/21/24  1306 05/20/24  1351 03/21/24  1007 01/06/24  1351   COLOR Light Yellow Straw Yellow Light Yellow   APPEARANCE Clear Clear Clear Clear   URINEGLC Negative Negative Negative Negative   URINEBILI Negative Negative Negative Negative   URINEKETONE Trace* Negative Negative Negative   SG 1.015 1.007 1.015 1.017   UBLD Negative Negative Trace* Moderate*   URINEPH 5.5 6.5 5.5 5.5   PROTEIN Negative Negative Trace* 50*   UROBILINOGEN  --   --  0.2  --    NITRITE Negative Negative Negative Negative   LEUKEST Negative Negative Trace* Negative   RBCU <1 0 0-2 1   WBCU <1 <1 5-10* 1     No lab results found.  PTH  Recent Labs   Lab Test 03/28/23  1129   PTHI 529*     IRON STUDIES  Recent Labs   Lab Test 05/23/24  0511 08/05/21  0931 05/13/21  0534 02/09/21  0518 01/21/21  1350 09/10/20  0830 09/19/19  1129 07/31/19  1050 07/03/19  0828 11/20/18  0934 10/19/18  1007 07/06/18  0856 05/24/17  0652   IRON 16* 30* 38 28* 36  29* 139 40 51 48 43 34* 63    240 215* 285 381 294 348 367 367 353 341 430 248   IRONSAT 5* 13* 18 10* 10* 10* 40 11* 14* 14* 13* 8* 25   VITA 24* 98 98 33  --   --   --  41 46 71  --  45 200       IMAGING:  US renal  IMPRESSION: No hydronephrosis. Simple left renal cyst.      William Aburto MD

## 2024-05-25 NOTE — PLAN OF CARE
Goal Outcome Evaluation:      Plan of Care Reviewed With: patient    Overall Patient Progress: improvingOverall Patient Progress: improving    D:Pt admitted with increased SOB and found to have a PE. Pt has PMH of NICM,HM2, DM,gout, gastric bypass , COPD and heart transplant.     A/I: Pt has been alert and oriented. Pt up independently. Pt has been in SR, other VSS., Pt had right heart cath and biopsy which was normal. Pt had been c/o HA which which started 10 days ago and been getting more intense. Pt sent for MRI this evening , awaiting results. Pt eating and drinking. Pt's Cr has been increasing without known cause. Nephology consulting. Pt had been c/o constipation but stated he finally had large BM today and feels much better. Pt denies c/o pain this shift. Pt currently has heparin gtt infusing at 1600 units an hour  next 10 a in am.. Pt did receive a 250 FF for orthostatic hypotension. Pt reported feeling much better.    P: Continue to bridge heparin until INR therapeutic. Continue current POC

## 2024-05-25 NOTE — PLAN OF CARE
VSS. A&Ox4. Orthostatic BP stable, but pt continues to have intermittent dizziness with ambulation. SR 90s on the monitor with occasional drops to 50-60s and junctional runs. Low HR not always correlating with dizziness this shift. EP consulted by primary team, no intervention needed at this time. Complains of 4/10 headache- 1 dose tramadol given. IV iron given as ordered. 250ml fluid bolus given. Plan to continue to monitor patient. Notify Cards 2 with changes or concerns.

## 2024-05-25 NOTE — PROGRESS NOTES
0230: Pt woke up complaining of dizziness, diaphoresis, and nausea ~ 0230. Stated they checked monitor at this time and they were in the 50-60s. Writer checked tele and noticed patient had gone into a junctional rhythm for a period of time. (Strip saved)    Text-paged overnight cards resident about symptomatic episode: NPO orders placed, EKG obtained, AM labs to be drawn early.    0530: pt reported another symptomatic episode, junctional rhythm confirmed with saved tele strip

## 2024-05-25 NOTE — PROGRESS NOTES
Hours of Care: 2300 - 0700    Changed:   Symptomatic junctional rhythm overnight x2, see writer's previous note for details  Running: heparin @ 1600 unit(s)/hr, Xa recheck tomorrow AM  Tele: SR/ST  / Occ junctional rhythm 50-60s  Mobility: up ad francisco    Neuro: A/O x 4. Call light appropriate. Able to make needs known.  Respiratory: On room air. Lung sounds clear, diminished in bases. Denies shortness of breath at rest.  Cardiac: VSS. No CP.   GI/: Last BM 5/24. No report of N/V. Urinating adequate amounts of clear, yellow urine.  Skin: See PCS for assessment and treatment of wounds and surgical incisions.  LDA: R PIV x2   Pain: managed with current pain regimen  Diet: NPO since 0330    P: Potential intervention for symptoms associated with junctional rhythm overnight. Continue to follow POC and report changes to CARDS 2.

## 2024-05-25 NOTE — CONSULTS
[facility]    Cardiology Consult Note - Electrophysiology      Date of Admission:  5/20/2024  Consult Requested by: Cards 2 Team  Reason for Consult: Bradycardia    Assessment & Plan: HVSL   Jim Willingham is a 68 year old male admitted on 5/20/2024. He has a PMH most remarkable for NICM and is status post OHT on 5/6/2021. He was admitted for SOB on 5/20/2024 and found to have a PE. Advanced Heart Failure consulted EP for evaluation of bradycardia.    Impression.  Asymptomatic Bradycardia While Sleeping  Junctional Rhythm with Retrograde P-waves  NICM status post OHT in 2021  COPD  Acute, Non-Provoked PE     --Patient presented to the hospital with Shortness of breath, and was diagnosed with non-provoked PE. Overnight last night he had some bradycardia that was junctional. He did have evidence of AV conduction during these episodes (retrograde) demonstrating intact AV shara function.  --He had some symptoms with this finding last night; however these were likely coincidental as he was asymptomatic during the episode he was having this afternoon while I was interviewing him. No concern for AV block. When he is awake he has normal antegrade conduction at reasonable rates.    Recommendations:  Continue to monitor patient no indication for emergent or urgent pacemaker  If there is no further concern symptoms of findings on telemetry, can discharge him with 2 week ziopatch and follow up in EP [facility] clinic.    Staffed with Dr. Santos.    Al Vinson MD PhD  Cardiac Electrophysiology Fellow  P: Text Page     I very much appreciated the opportunity to  assess Jim Willingham in the hospital with CV EP Fellow Dr Vinson. I agree with the note above which summarizes my findings and current recommendations.  Please do not hesitate to contact my office if you have any questions or concerns.      Nba Santos MD  Cardiac Arrhythmia Service  [facility]  [phone]   ______________________________________________________________________    Chief Complaint   A consult was placed by the inpatient heart failure team for evaluation of bradycardia.     History is obtained from the patient, chart review, and discussion with primary team.    History of Present Illness   Jim Willingham is a 68 year old male admitted on 5/20/2024. He has a PMH most remarkable for NICM and is status post OHT on 5/6/2021. He was admitted for SOB on 5/20/2024 and found to have a PE. Advanced Heart Failure consulted EP for evaluation of bradycardia.     Patient presented to the hospital with Shortness of breath, and was diagnosed with non-provoked PE. Overnight last night he had some bradycardia that was junctional. He did have evidence of AV conduction during these episodes (retrograde) demonstrating intact AV shara function. He has had several episodes of this, two last night where he was potentially symptomatic; and one this afternoon when I interviewed him (asymptomatic). No concern for AV block. When he is awake he has normal antegrade conduction at reasonable rates.          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    Generalized osteoarthritis 09/26/2023    Gouty arthropathy, chronic, without tophi 11/02/2020    H/O adenomatous polyp of colon 08/03/2016    2 polyps    H/O gastric bypass 05/11/2017    History of type 2 diabetes mellitus 03/28/2023    Hyperlipidemia LDL goal <70 09/27/2022    ICD (implantable  cardioverter-defibrillator), biventricular, in situ 05/11/2017    IFG (impaired fasting glucose) 09/26/2023    LVAD (left ventricular assist device) present (H)     Major depression, recurrent, chronic (H24) 11/02/2020    Mild anemia 03/18/2022    NICM (nonischemic cardiomyopathy) (H)/ EF 20% 05/11/2017    ECHO: LVEDd. 7.66 cm, Restrictive pattern , Severe mitral valve regurgitation    CECILIA (obstructive sleep apnea) 05/11/2017    Paroxysmal atrial fibrillation (H) 05/11/2017    Paroxysmal VT (H) 05/11/2017    Peripheral polyneuropathy 03/28/2023    Postsurgical dumping syndrome 03/21/2024    Pulmonary cavitary lesion 11/18/2021    Rotator cuff tear arthropathy of both shoulders 11/02/2020    Type 2 diabetes mellitus with diabetic polyneuropathy (H) 03/18/2022    Uncomplicated asthma     Vitamin B12 deficiency (non anemic) 05/11/2017       Past Surgical History   Past Surgical History:   Procedure Laterality Date    ANESTHESIA CARDIOVERSION N/A 05/11/2020    Procedure: ANESTHESIA, FOR CARDIOVERSION @1100;  Surgeon: GENERIC ANESTHESIA PROVIDER;  [facility]    BRONCHOSCOPY (RIGID OR FLEXIBLE), DIAGNOSTIC N/A 8/30/2021    Procedure: BRONCHOSCOPY, WITH BRONCHOALVEOLAR LAVAGE;  Surgeon: Perlman, David Morris, MD;  Location:  GI    COLONOSCOPY N/A 4/18/2024    Procedure: COLONOSCOPY, WITH POLYPECTOMY;  Surgeon: Jermaine Root MD;  Location:  GI    CV CORONARY ANGIOGRAM N/A 5/17/2022    Procedure: Coronary Angiogram;  Surgeon: Jeffrey Gibson MD;  Location: [facility] CARDIAC CATH LAB    CV CORONARY ANGIOGRAM N/A 5/23/2023    Procedure: Coronary Angiogram;  Surgeon: Scout Robins MD;  Location:  HEART CARDIAC CATH LAB    CV HEART BIOPSY N/A 5/13/2021    Procedure: Heart Biopsy;  Surgeon: Scout Robins MD;  Location:  HEART CARDIAC CATH LAB    CV HEART BIOPSY N/A 5/20/2021    Procedure: Heart Biopsy;  Surgeon: Jeffrey Gibson MD;  Location: [facility] CARDIAC  CATH LAB    CV HEART BIOPSY N/A 5/27/2021    Procedure: Heart Biopsy;  Surgeon: Jac Dover MD;  [facility]    CV HEART BIOPSY N/A 6/7/2021    Procedure: CV HEART BIOPSY;  Surgeon: Jeffrey Gibson MD;  [facility]    CV HEART BIOPSY N/A 6/21/2021    Procedure: CV HEART BIOPSY;  Surgeon: Scout Robins MD;  [facility]    CV HEART BIOPSY N/A 7/5/2021    Procedure: CV HEART BIOPSY;  Surgeon: Jeffrey Gibson MD;  Location:  HEART CARDIAC CATH LAB    CV HEART BIOPSY N/A 7/16/2021    Procedure: Heart Biopsy;  Surgeon: Amadeo Art MD;  Location:  HEART CARDIAC CATH LAB    CV HEART BIOPSY N/A 8/5/2021    Procedure: Heart Biopsy;  Surgeon: Jeffrey Gibson MD;  Location:  HEART CARDIAC CATH LAB    CV HEART BIOPSY N/A 8/31/2021    Procedure: Heart Cath Heart Biopsy;  Surgeon: Jeffrey Gibson MD;  [facility]    CV HEART BIOPSY N/A 9/21/2021    Procedure: CV HEART BIOPSY;  Surgeon: Jeffrey Gibson MD;  Location:  HEART CARDIAC CATH LAB    CV HEART BIOPSY N/A 10/5/2021    Procedure: CV HEART BIOPSY;  Surgeon: Jeffrey Gibson MD;  [facility]    CV HEART BIOPSY N/A 11/4/2021    Procedure: CV HEART BIOPSY;  Surgeon: Nicola Seth MD;  Location:  HEART CARDIAC CATH LAB    CV HEART BIOPSY N/A 5/17/2022    Procedure: Heart Biopsy;  Surgeon: Jeffrey Gibson MD;  Location:  HEART CARDIAC CATH LAB    CV HEART BIOPSY N/A 5/23/2023    Procedure: Heart Biopsy;  Surgeon: Scout Robins MD;  Location:  HEART CARDIAC CATH LAB    CV HEART BIOPSY N/A 5/23/2024    Procedure: Heart Biopsy;  Surgeon: Precious Bautista MD;  Location:  HEART CARDIAC CATH LAB    CV RIGHT HEART CATH MEASUREMENTS RECORDED N/A 07/24/2019    Procedure: CV RIGHT HEART CATH;  Surgeon: Renu  MD Renu;  Location:  HEART CARDIAC CATH LAB    CV RIGHT HEART CATH MEASUREMENTS RECORDED N/A 08/05/2020    Procedure: CV RIGHT HEART CATH;  Surgeon: Nicola Seth MD;  Location:  HEART CARDIAC CATH LAB    CV RIGHT HEART CATH MEASUREMENTS RECORDED N/A 01/07/2021    Procedure: CV RIGHT HEART CATH;  Surgeon: Jac Dover MD;  Location:  HEART CARDIAC CATH LAB    CV RIGHT HEART CATH MEASUREMENTS RECORDED N/A 02/23/2021    Procedure: Heart Cath Right Heart Cath;  Surgeon: Jeffrey Gibson MD;  Location:  HEART CARDIAC CATH LAB    CV RIGHT HEART CATH MEASUREMENTS RECORDED N/A 03/23/2021    Procedure: Heart Cath Right Heart Cath. request for 3/23;  Surgeon: Jeffrey Gibson MD;  Location:  HEART CARDIAC CATH LAB    CV RIGHT HEART CATH MEASUREMENTS RECORDED N/A 5/13/2021    Procedure: Right Heart Cath;  Surgeon: Scout Robins MD;  Location:  HEART CARDIAC CATH LAB    CV RIGHT HEART CATH MEASUREMENTS RECORDED N/A 5/20/2021    Procedure: Right Heart Cath;  Surgeon: Jeffrey Gibson MD;  Location:  HEART CARDIAC CATH LAB    CV RIGHT HEART CATH MEASUREMENTS RECORDED N/A 5/27/2021    Procedure: Right Heart Cath;  Surgeon: Jac Dover MD;  Location:  HEART CARDIAC CATH LAB    CV RIGHT HEART CATH MEASUREMENTS RECORDED N/A 6/7/2021    Procedure: CV RIGHT HEART CATH;  Surgeon: Jeffrey Gibson MD;  Location:  HEART CARDIAC CATH LAB    CV RIGHT HEART CATH MEASUREMENTS RECORDED N/A 6/21/2021    Procedure: CV RIGHT HEART CATH;  Surgeon: Scout Robins MD;  Location:  HEART CARDIAC CATH LAB    CV RIGHT HEART CATH MEASUREMENTS RECORDED N/A 7/5/2021    Procedure: CV RIGHT HEART CATH;  Surgeon: Jeffrey Gibson MD;  Location:  HEART CARDIAC CATH LAB    CV RIGHT HEART CATH MEASUREMENTS RECORDED N/A 7/16/2021    Procedure: Right Heart Cath;  Surgeon: Amadeo Art MD;  [facility]     CV RIGHT HEART CATH MEASUREMENTS RECORDED N/A 8/5/2021    Procedure: CV RIGHT HEART CATH;  Surgeon: Jeffrey Gibson MD;  Location:  HEART CARDIAC CATH LAB    CV RIGHT HEART CATH MEASUREMENTS RECORDED N/A 8/31/2021    Procedure: Heart Cath Right Heart Cath;  Surgeon: Jeffrey Gibson MD;  Location:  HEART CARDIAC CATH LAB    CV RIGHT HEART CATH MEASUREMENTS RECORDED N/A 9/21/2021    Procedure: CV RIGHT HEART CATH;  Surgeon: Jeffrey Gibson MD;  Location:  HEART CARDIAC CATH LAB    CV RIGHT HEART CATH MEASUREMENTS RECORDED N/A 10/5/2021    Procedure: CV RIGHT HEART CATH;  Surgeon: Jeffrey Gibson MD;  Location:  HEART CARDIAC CATH LAB    CV RIGHT HEART CATH MEASUREMENTS RECORDED N/A 11/4/2021    Procedure: CV RIGHT HEART CATH;  Surgeon: Nicola Seth MD;  Location:  HEART CARDIAC CATH LAB    CV RIGHT HEART CATH MEASUREMENTS RECORDED N/A 5/17/2022    Procedure: Right Heart Catheterization;  Surgeon: Jeffrey Gibson MD;  Location:  HEART CARDIAC CATH LAB    CV RIGHT HEART CATH MEASUREMENTS RECORDED N/A 5/23/2023    Procedure: Right Heart Catheterization;  Surgeon: Scout Robins MD;  Location:  HEART CARDIAC CATH LAB    CV RIGHT HEART CATH MEASUREMENTS RECORDED N/A 5/23/2024    Procedure: Right Heart Cath- pre noon with rush path;  Surgeon: Precious Bautista MD;  Location:  HEART CARDIAC CATH LAB    GI SURGERY  2003    [facility]    INSERT VENTRICULAR ASSIST DEVICE LEFT (HEARTMATE II) N/A 06/19/2017    Procedure: INSERT VENTRICULAR ASSIST DEVICE LEFT (HEARTMATE II);  Median Sternotomy Heartmate II Left Ventricular Assist Device Insertion on Pump Oxygenator;  Surgeon: Ronnie Quigley MD;  [facility]    IR CHEST TUBE PLACEMENT NON-TUNNELED RIGHT  7/11/2021    LAPAROSCOPY DIAGNOSTIC (GENERAL) N/A 1/4/2024    Procedure: Diagnostic Laparoscopy, Exploratory Laparotomy with Extensive lysis of adhesions.;  Surgeon: Frederick Montgomery  MD Axel;  [facility]    ORTHOPEDIC SURGERY  1994    right knee wired    PICC DOUBLE LUMEN PLACEMENT Right 09/23/2020    5FR PICC DL. Length-43cm (1cm out).    PICC INSERTION - DOUBLE LUMEN Right 05/09/2021    IK/BRACH    RELEASE CARPAL TUNNEL BILATERAL Bilateral 02/18/2021    Procedure: Bilateral carpal tunnel release;  Surgeon: Jermaine Brand MD;  [facility]    TRANSPLANT HEART RECIPIENT N/A 05/05/2021    Procedure: Redo median sternotomy, lysis of adhesions, heart transplant recipient, on cardiopulmonary bypass, intraoperative transesophageal echocardiogram per anesthesia, Implantable Cardioverter Defibrillator (ICD) removal;  Surgeon: Ronnie Quigley MD;  [facility]       Medications   I have reviewed this patient's current medications        Physical Exam   Vital Signs: Temp: 98.1  F (36.7  C) Temp src: Oral BP: 111/69 Pulse: 74   Resp: 18 SpO2: 99 % O2 Device: None (Room air)    Weight: 200 lbs 14.4 oz    General Appearance: Well appearing, no distress  Eyes: GIOVANNY, EOMI  HEENT: NC, AT. MMM.   Respiratory: CTAB, normal work of breathing  Cardiovascular: Regular Rate and Rhythm, normal S1, S2. No murmurs, rubs, gallops  GI: Soft, non-tender, non-distedned  Lymph/Hematologic: No asymetric swelling, edema. No bruising.  Genitourinary: Deferred  Skin: No rashes, lesions, wounds.  Musculoskeletal: Warm, well perfused  Neurologic: AOx4, CNII-XII intact.  Psychiatric: Euthymic. Mood appropriate.       Medical Decision Making       60 MINUTES SPENT BY ME on the date of service doing chart review, history, exam, documentation & further activities per the note.      Data     I have personally reviewed the following data over the past 24 hrs:    5.3  \   9.6 (L)   / 187     134 (L) 103 57.2 (H) /  103 (H)   4.5 19 (L) 2.85 (H) \     INR:  1.40 (H) PTT:  N/A   D-dimer:  N/A Fibrinogen:  N/A       Imaging results reviewed over the past 24 hrs:   Recent Results (from the past 24 hour(s))   MR Brain w/o & w  Contrast    Narrative     MR BRAIN W/O & W CONTRAST 5/24/2024 10:04 PM    Provided History: persistent headache in a transplant/  immunosuppressed patient (>7 days), worse in the morning.  ICD-10:    Comparison: 12/16/2021.    Technique: Multiplanar T1-weighted, axial FLAIR, and susceptibility  images were obtained without intravenous contrast. Following  intravenous gadolinium-based contrast administration, axial  T2-weighted, diffusion, and T1-weighted images (in multiple planes)  were obtained.    Contrast: 9mL Gadavist     Findings:  There is no mass effect, midline shift, or evidence of intracranial  hemorrhage. The ventricles are proportionate to the cerebral sulci.  Normal major vascular intracranial flow-voids. There are scattered  areas of elevated diffusion signal however these areas also  demonstrate elevated signal on ADC map and T2-weighted imaging  consistent with T2 shine through. No evidence of infarct on  diffusion-weighted images. Scattered periventricular T2  hyperintensities likely sequelae of chronic small vessel ischemic  disease, similar to the prior exam. Scattered punctate areas of  susceptibility artifact similar to prior exam.    Postcontrast images demonstrate no abnormal intracranial enhancement.    No abnormality of the skull marrow signal. The visualized portions of  paranasal sinuses, and mastoid air cells are relatively clear. The  orbits are grossly unremarkable.      Impression    Impression:  1. No acute intracranial abnormality.  2. No enhancing lesion.  3. Scattered periventricular T2 hyperintensities are likely sequelae  of chronic small vessel ischemic disease.  4. There are scattered areas of punctate susceptibility artifact in a  similar distribution to 12/16/2021 possibly representing prior  microhemorrhages or early amyloid angiopathy.    I have personally reviewed the examination and initial interpretation  and I agree with the findings.    CONI MEDINA MD          SYSTEM ID:  [id number]

## 2024-05-26 NOTE — PLAN OF CARE
D: Pt admitted for increased SOB and found to have a PE.    I: Monitored and assessed patient status; nursing cares provided.    A:   Today's Highlights/Changes:  Pt's INR 1.60, continues on Heparin gtt while bridging to coumadin with goal INR of 2-3. Heparin gtt rate decreased twice today and is currently infusing at 1400 units/hr with the next 10a check scheduled for 2200.  Orthostatic BP's negative, pt did report increased dizziness after standing for ~1-1.5 minutes, although said the dizziness improved since yesterday.    bolus x1 given this afternoon, lasix held.   Iron infusion dose #3 of 5 given this am without concern.    Neuro: A&Ox4, pleasant, able to make needs known.  Cardiac: SB/SR, HR 56-90's; no junctional rhythm episodes noted.  Respiratory: Lungs clear, RESENDIZ, on RA with sats ~97%, denies chest pain and chest palpitations.  GI/: +BS, BM x1; voiding via urinal  Diet/Appetite: Regular, fair appetite  Skin: Intact  Pain: Headache relieved with tylenol x2 and tramadol x1  Labs/Lytes: K+ 4.7, Mg 2.2---no RN replacement protocols ordered.  LDA's: Right PIV x2, Heparin gtt infusing at 1400 units/hr, other PIV SL'd  Activity: Up independently to bedside commode; otherwise stand-by-assist depending on level of dizziness.    P: Continue with current plan of care; contact Cards 2 for questions or concerns.    Xiomara Yuan, RN  Cardiology

## 2024-05-26 NOTE — PLAN OF CARE
Neuro: A&Ox4. Able to make needs known  Cardiac: SR/ST with occasional Junctional Rhythm episodes. Asymptomatic. HR .    Respiratory: RA, Satting >95%  GI/: Voiding spontaneously. No BM this shift.  Diet/appetite: Tolerating Renal diet. Encourage Po intake  Reported Nausea- Zofran 4mg given x 1.   Activity: Up ad francisco  Pain: headache -managed by current regimen   Skin: No deficits noted.  Lines: R PIV x 2- Heparin gtt at 1600U/hr. Next Xa this morning; other line TKO  (1 PIV removed d/t pain, new access inserted by Vascular)  Plan: Continue to monitor status and update Primary Team with changes    Changes this shift: 3-4 Junctional rhythm/SB episodes this shift and converted back to SR/ST. Pt asymptomatic.   Xa came back at 0.73. Heparin protocol ran and paused for 60mins and subtract 100 from current dose which is 1500U/hr.

## 2024-05-26 NOTE — PLAN OF CARE
Goal Outcome Evaluation:      Plan of Care Reviewed With: patient    Overall Patient Progress: no changeOverall Patient Progress: no change     D:Pt admitted with c/o SOB and weight gain. Pt found to have a PE. See H and P for PMH.    A/I: Pt  continues on heparin gtt at 1600 units an hour next 10 a in am. Pt has been in ST rates in the 90's. Pt has also had 4-5 episodes of junctional rhythm in the 50-60. Pt  has reported feeling more SOB when episodes have occurred. And became  much more anxious and SOB with last episode pt reporting feeling throat closing. VSS while junctional episodes, sats remained 100 %. Pt given duo neb, 25 mcq of IV benadryl and tramadol 50 mg po. X 1 Pt's rhythm returned to ST rates in the 90's. Pt reported feeling less SOB and more calm.. MD had been notified of SOB and aware. Pt had right heart cath and biopsy which was normal. Pt c/o HA and states continuing to have GARCIA. Pt had MRI which was normal. Nephrology came and seen pt and stated still unsure cause of CR elevation , did slightly decrease but will continue to monitor. Pt did ambulate in [place]yers only approximately 50 ft and became very tired and stated legs ached. Pt's FSBG checked during 2 of junctional episodes and fsbg's 120-180's. Pt currently resting well. Pt has had no further complaints of SOB. Pt has had little uo this shift. Pt reported drinking 900 ml of fluid and only voided approx 450 ml.

## 2024-05-26 NOTE — PROVIDER NOTIFICATION
Pt had a run of Junctional Rhythm around 0118am to 0121 and converted back to SR/ST. Pt asymptomatic. HR . Night Cards resident informed.

## 2024-05-26 NOTE — PROGRESS NOTES
Nephrology progress note   05/26/2024      Jim Willingham MRN:[id number] YOB: 1955  Date of Admission:5/20/2024  Primary care provider: Ricardo Gómez  Requesting physician: Riaz Montaño MD    ASSESSMENT AND RECOMMENDATIONS:   Jim Willingham is a 67 yo male with a history of NICM (s/p HM2 LVAD 6/2017) s/p OHT 5/6/21, gastric bypass, CKD 3, DM type II, gout, and COPD who presented for RESENDIZ and was found to have a PE. Nephrology was consulted for WALTER.      WALTER on CKD stage 3b  CKD was diagnosed years ago; pt does not recall being told the etiology. May be due to pt's DM, HTN, or past cardiorenal from heart failure. Baseline Cr ~1.4-1.8. Cr 3 on admission. Initial workup with bland UA, no hydronephrosis on renal US, most recent tacrolimus level 4.8. New DSA noted, but RHC today reveal RA 10, wedge 15. Reported baseline weight 192-195 lbs. Cr improved to 2.8 with diuretics but has not improved further thus far. Pt was hypotensive with SBP 80s on 5/22 but Cr did not change much after this episode. Suspect initially prerenal/cardiorenal vs EMILY (with tacro use), then possible over-diuresis with Lasix.   At this point we still think the main mechanism behind his WALTER is impaired renal autoregulation on a background of pertinent cardiac HX and being chronically on tac   Spin urine, bland UA   Uric acid at  5.3  - Orthostatic testing is positive   - Hold Lasix for today   Negative  LDH and haptoglobin (ordered to rule out local TMA in his kidney though unlikely as HB and PLT are stable)  -C/w low K diet ,  K down to 4.7  -follow-up blood and urine BK viral levels (though unlikely WALTER is related to BK nephropathy given lack of hematuria)   -I ordered another 250 ml of NS for 5/26      # Status post OHT on 5/2021, history of NICM  # Chronic immunosuppression  DSAs:  NEW DSA noted this admission   -follow-up heart biopsy results     Normocytic anemia, chronic  Baseline hgb ~11. Hgb 10 at the time of consult  without overt signs of bleeding. Pt took venofir in the past.   - Ordered iron studies showing iron 16 and iron sat 5%  - Recommend IV iron infusion (ordered by primary team)    Hypertension (soft bp this admission)  SBP has been 100-110s. Not currently on any BP meds. Pt reports he has not needed HTN for quite some time.   - CTM    BMD  Mild hyperphosphatemia likely iso WALTER.  - CTM      Recommendations were communicated to primary team via verbal/note    Seen and discussed with Dr. Renny Aburto MD         REASON FOR CONSULT: WALTER     Interval events   Cr 2.9 to 2.7  Still euvolemic   On heparin drip   Weight is 89.5 to 89.8 to 91.1 to 91.7  No lasix yesterday or today  No orthostasis today     Since [place]      PAST MEDICAL HISTORY:  Reviewed with patient on 05/26/2024     Past Medical History:   Diagnosis Date    Bariatric surgery status 2003    Benign essential hypertension 05/11/2017    Bilateral carpal tunnel syndrome 11/02/2020    BPH with obstruction/lower urinary tract symptoms 03/21/2024    Cardiomyopathy, unspecified (H) 05/08/2017    CKD (chronic kidney disease) stage 3, GFR 30-59 ml/min (H) 05/11/2017    COPD (chronic obstructive pulmonary disease) (H) 11/02/2020    Depression 05/11/2017    Diabetes mellitus (H) 1995    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    Generalized osteoarthritis 09/26/2023    Gouty arthropathy, chronic, without tophi 11/02/2020    H/O adenomatous polyp of colon 08/03/2016    2 polyps    H/O gastric bypass 05/11/2017    History of type 2 diabetes mellitus 03/28/2023    Hyperlipidemia LDL goal <70 09/27/2022    ICD (implantable cardioverter-defibrillator), biventricular, in situ 05/11/2017    IFG (impaired fasting glucose) 09/26/2023    LVAD (left ventricular assist device) present (H)     Major depression, recurrent, chronic (H24) 11/02/2020    Mild anemia 03/18/2022    NICM (nonischemic cardiomyopathy) (H)/ EF 20% 05/11/2017    ECHO: LVEDd. 7.66 cm, Restrictive  pattern , Severe mitral valve regurgitation    CECILIA (obstructive sleep apnea) 05/11/2017    Paroxysmal atrial fibrillation (H) 05/11/2017    Paroxysmal VT (H) 05/11/2017    Peripheral polyneuropathy 03/28/2023    Postsurgical dumping syndrome 03/21/2024    Pulmonary cavitary lesion 11/18/2021    Rotator cuff tear arthropathy of both shoulders 11/02/2020    Type 2 diabetes mellitus with diabetic polyneuropathy (H) 03/18/2022    Uncomplicated asthma     Vitamin B12 deficiency (non anemic) 05/11/2017       Past Surgical History:   Procedure Laterality Date    ANESTHESIA CARDIOVERSION N/A 05/11/2020    Procedure: ANESTHESIA, FOR CARDIOVERSION @1100;  Surgeon: GENERIC ANESTHESIA PROVIDER;  Location: [facility]    BRONCHOSCOPY (RIGID OR FLEXIBLE), DIAGNOSTIC N/A 8/30/2021    Procedure: BRONCHOSCOPY, WITH BRONCHOALVEOLAR LAVAGE;  Surgeon: Perlman, David Morris, MD;  Location: [facility]    COLONOSCOPY N/A 4/18/2024    Procedure: COLONOSCOPY, WITH POLYPECTOMY;  Surgeon: Jermaine Root MD;  Location: [facility]    CV CORONARY ANGIOGRAM N/A 5/17/2022    Procedure: Coronary Angiogram;  Surgeon: Jeffrey Gibson MD;  [facility]    CV CORONARY ANGIOGRAM N/A 5/23/2023    Procedure: Coronary Angiogram;  Surgeon: Scout Robins MD;  [facility]    CV HEART BIOPSY N/A 5/13/2021    Procedure: Heart Biopsy;  Surgeon: Scout Robins MD;  [facility]    CV HEART BIOPSY N/A 5/20/2021    Procedure: Heart Biopsy;  Surgeon: Jeffrey Gibson MD;  [facility]    CV HEART BIOPSY N/A 5/27/2021    Procedure: Heart Biopsy;  Surgeon: Jac Dover MD;  [facility]    CV HEART BIOPSY N/A 6/7/2021    Procedure: CV HEART BIOPSY;  Surgeon: Jeffrey Gibson MD;  [facility]    CV HEART BIOPSY N/A 6/21/2021    Procedure: CV HEART BIOPSY;  Surgeon:  Scout Robins MD;  Location:  [facility]ARD[facility] CATH [facility]    CV HEART BIOPSY N/A 7/5/2021    Procedure: CV HEART BIOPSY;  Surgeon: Jeffrey Gibson MD;  [facility]    CV HEART BIOPSY N/A 7/16/2021    Procedure: Heart Biopsy;  Surgeon: Amadeo Art MD;  Location:  [facility]ARD[facility] CATH [facility]    CV HEART BIOPSY N/A 8/5/2021    Procedure: Heart Biopsy;  Surgeon: Jeffrey Gibson MD;  Location:  [facility] CARD[facility] CATH LAB    CV HEART BIOPSY N/A 8/31/2021    Procedure: Heart Cath Heart Biopsy;  Surgeon: Jeffrey Gibson MD;  Location:  [facility] CATH [facility]    CV HEART BIOPSY N/A 9/21/2021    Procedure: CV HEART BIOPSY;  Surgeon: Jeffrey Gibson MD;  Location:  [facility] CARDIAC CATH LAB    CV HEART BIOPSY N/A 10/5/2021    Procedure: CV HEART BIOPSY;  Surgeon: Jeffrey Gibson MD;  Location:  [facility] CARDIAC CATH LAB    CV HEART BIOPSY N/A 11/4/2021    Procedure: CV HEART BIOPSY;  Surgeon: Nicola Seth MD;  Location:  [facility] CARDIAC CATH LAB    CV HEART BIOPSY N/A 5/17/2022    Procedure: Heart Biopsy;  Surgeon: Jeffrey Gibson MD;  Location:  [facility] CARD[facility] CATH LAB    CV HEART BIOPSY N/A 5/23/2023    Procedure: Heart Biopsy;  Surgeon: Scout Robins MD;  Location:  [facility] CARDIAC CATH LAB    CV HEART BIOPSY N/A 5/23/2024    Procedure: Heart Biopsy;  Surgeon: Precious Bautista MD;  Location:  [facility]ART CARDIAC CATH LAB    CV RIGHT HEART CATH MEASUREMENTS RECORDED N/A 07/24/2019    Procedure: CV RIGHT HEART CATH;  Surgeon: Renu Sears MD;  Location:  HEART CARDIAC CATH LAB    CV RIGHT HEART CATH MEASUREMENTS RECORDED N/A 08/05/2020    Procedure: CV RIGHT HEART CATH;  Surgeon: Nicola Seth MD;  [facility]    CV RIGHT HEART CATH MEASUREMENTS RECORDED N/A 01/07/2021    Procedure: CV RIGHT HEART CATH;  Surgeon: Jac Dover MD;  Location: [facility] CARDIAC  CATH LAB    CV RIGHT HEART CATH MEASUREMENTS RECORDED N/A 02/23/2021    Procedure: Heart Cath Right Heart Cath;  Surgeon: Jeffrey Gibson MD;  [facility]    CV RIGHT HEART CATH MEASUREMENTS RECORDED N/A 03/23/2021    Procedure: Heart Cath Right Heart Cath. request for 3/23;  Surgeon: Jeffrey Gibson MD;  Location:  HEART CARDIAC CATH LAB    CV RIGHT HEART CATH MEASUREMENTS RECORDED N/A 5/13/2021    Procedure: Right Heart Cath;  Surgeon: Scout Robins MD;  Location:  HEART CARDIAC CATH LAB    CV RIGHT HEART CATH MEASUREMENTS RECORDED N/A 5/20/2021    Procedure: Right Heart Cath;  Surgeon: Jeffrey Gibson MD;  Location:  HEART CARDIAC CATH LAB    CV RIGHT HEART CATH MEASUREMENTS RECORDED N/A 5/27/2021    Procedure: Right Heart Cath;  Surgeon: Jac Dover MD;  Location:  HEART CARDIAC CATH LAB    CV RIGHT HEART CATH MEASUREMENTS RECORDED N/A 6/7/2021    Procedure: CV RIGHT HEART CATH;  Surgeon: Jeffrey Gibson MD;  Location:  HEART CARDIAC CATH LAB    CV RIGHT HEART CATH MEASUREMENTS RECORDED N/A 6/21/2021    Procedure: CV RIGHT HEART CATH;  Surgeon: Scout Robins MD;  Location:  HEART CARDIAC CATH LAB    CV RIGHT HEART CATH MEASUREMENTS RECORDED N/A 7/5/2021    Procedure: CV RIGHT HEART CATH;  Surgeon: Jeffrey Gibson MD;  Location:  HEART CARDIAC CATH LAB    CV RIGHT HEART CATH MEASUREMENTS RECORDED N/A 7/16/2021    Procedure: Right Heart Cath;  Surgeon: Amadeo Art MD;  Location:  HEART CARDIAC CATH LAB    CV RIGHT HEART CATH MEASUREMENTS RECORDED N/A 8/5/2021    Procedure: CV RIGHT HEART CATH;  Surgeon: Jeffrey Gbison MD;  Location:  HEART CARDIAC CATH LAB    CV RIGHT HEART CATH MEASUREMENTS RECORDED N/A 8/31/2021    Procedure: Heart Cath Right Heart Cath;  Surgeon: Jeffrey Gibson MD;  Location:  HEART CARDIAC CATH LAB    CV RIGHT  HEART CATH MEASUREMENTS RECORDED N/A 9/21/2021    Procedure: CV RIGHT HEART CATH;  Surgeon: Jeffrey Gibson MD;  Location:  [facility]ART CARDIAC CATH LAB    CV RIGHT HEART CATH MEASUREMENTS RECORDED N/A 10/5/2021    Procedure: CV RIGHT HEART CATH;  Surgeon: Jeffrey Gibson MD;  Location:  HEART CARDIAC CATH LAB    CV RIGHT HEART CATH MEASUREMENTS RECORDED N/A 11/4/2021    Procedure: CV RIGHT HEART CATH;  Surgeon: Nicola Seth MD;  Location:  HEART CARDIAC CATH LAB    CV RIGHT HEART CATH MEASUREMENTS RECORDED N/A 5/17/2022    Procedure: Right Heart Catheterization;  Surgeon: Jeffrey Gibson MD;  Location:  HEART CARDIAC CATH LAB    CV RIGHT HEART CATH MEASUREMENTS RECORDED N/A 5/23/2023    Procedure: Right Heart Catheterization;  Surgeon: Scout Robins MD;  Location:  HEART CARDIAC CATH LAB    CV RIGHT HEART CATH MEASUREMENTS RECORDED N/A 5/23/2024    Procedure: Right Heart Cath- pre noon with rush path;  Surgeon: Precious Bautista MD;  Location:  HEART CARDIAC CATH LAB    GI SURGERY  2003    [place]    INSERT VENTRICULAR ASSIST DEVICE LEFT (HEARTMATE II) N/A 06/19/2017    Procedure: INSERT VENTRICULAR ASSIST DEVICE LEFT (HEARTMATE II);  Median Sternotomy Heartmate II Left Ventricular Assist Device Insertion on Pump Oxygenator;  Surgeon: Ronnie Quigley MD;  Location:  OR    IR CHEST TUBE PLACEMENT NON-TUNNELED RIGHT  7/11/2021    LAPAROSCOPY DIAGNOSTIC (GENERAL) N/A 1/4/2024    Procedure: Diagnostic Laparoscopy, Exploratory Laparotomy with Extensive lysis of adhesions.;  Surgeon: Frederick Montgomery MD;  Location:  OR    ORTHOPEDIC SURGERY  1994    right knee wired    PICC DOUBLE LUMEN PLACEMENT Right 09/23/2020    5FR PICC DL. Length-43cm (1cm out).    PICC INSERTION - DOUBLE LUMEN Right 05/09/2021    IK/BRACH    RELEASE CARPAL TUNNEL BILATERAL Bilateral 02/18/2021    Procedure: Bilateral carpal tunnel release;  Surgeon: Jermaine Brand MD;  Location:   OR    TRANSPLANT HEART RECIPIENT N/A 05/05/2021    Procedure: Redo median sternotomy, lysis of adhesions, heart transplant recipient, on cardiopulmonary bypass, intraoperative transesophageal echocardiogram per anesthesia, Implantable Cardioverter Defibrillator (ICD) removal;  Surgeon: Ronnie Quigley MD;  Location:  OR        MEDICATIONS:  PTA Meds  Prior to Admission medications    Medication Sig Last Dose Taking? Auth Provider Long Term End Date   acetaminophen (TYLENOL) 325 MG tablet Take 3 tablets (975 mg) by mouth every 8 hours as needed for mild pain 5/19/2024 at unknown Yes Frederick Montgomery MD No    albuterol (PROAIR HFA/PROVENTIL HFA/VENTOLIN HFA) 108 (90 Base) MCG/ACT inhaler Inhale 2 puffs into the lungs every 4 hours as needed for shortness of breath, wheezing or cough 5/20/2024 at am Yes Ricardo Gómez MD Yes    allopurinol (ZYLOPRIM) 300 MG tablet TAKE 1 TABLET BY MOUTH DAILY 5/20/2024 at am Yes Ricardo Gómez MD No    aspirin (ASA) 81 MG chewable tablet Take 1 tablet (81 mg) by mouth daily 5/20/2024 at am Yes Clara Valentin PA-C     buPROPion (WELLBUTRIN XL) 300 MG 24 hr tablet Take 1 tablet (300 mg) by mouth every morning 5/20/2024 at am Yes Ricardo Gómez MD Yes    cyanocobalamin (VITAMIN B-12) 1000 MCG tablet Take 1,000 mcg by mouth daily Unknown at unknown Yes Unknown, Entered By History     diphenhydrAMINE (BENADRYL) 25 MG tablet Take 25 mg by mouth every 6 hours as needed for itching Past Week at unknown Yes Reported, Patient No    gabapentin (NEURONTIN) 300 MG capsule Take 300 mg by mouth daily 5/20/2024 at am Yes Reported, Patient No    ipratropium - albuterol 0.5 mg/2.5 mg/3 mL (DUONEB) 0.5-2.5 (3) MG/3ML neb solution Take 1 vial by nebulization 4 times daily as needed for shortness of breath, wheezing or cough Unknown at unknown Yes Reported, Patient No    Melatonin 10 MG CAPS Take 1 capsule by mouth nightly as needed Past Week at unknown Yes Reported, Patient     montelukast  (SINGULAIR) 10 MG tablet TAKE 1 TABLET BY MOUTH AT  BEDTIME 5/19/2024 at pm Yes Ricardo Gómez MD Yes    mycophenolate (GENERIC EQUIVALENT) 250 MG capsule TAKE one CAPSULE BY MOUTH TWICE A DAY 5/20/2024 at am Yes Delisa Montgomery MD Yes    polyethylene glycol (MIRALAX) 17 GM/Dose powder Take 1 Capful by mouth daily as needed for constipation Unknown at unknown Yes Reported, Patient     tacrolimus (GENERIC EQUIVALENT) 0.5 MG capsule Take 0.5 mg by mouth every morning With 3 mg capsules 5/20/2024 at am Yes Delisa Montgomery MD     tacrolimus (GENERIC EQUIVALENT) 1 MG capsule TAKE 3 CAPSULES BY MOUTh IN am and in pm. 5/20/2024 at am Yes Delisa Montgomery MD     traMADol (ULTRAM) 50 MG tablet Take 50 mg by mouth every 6 hours as needed for severe pain Unknown at unknown Yes Reported, Patient     VITAMIN D PO Take 1 capsule by mouth daily 5/20/2024 at am Yes Reported, Patient     rosuvastatin (CRESTOR) 10 MG tablet Take 1 tablet (10 mg) by mouth daily   Ricardo Gómez MD Yes       Current Meds  Current Facility-Administered Medications   Medication Dose Route Frequency Provider Last Rate Last Admin    allopurinol (ZYLOPRIM) tablet 300 mg  300 mg Oral Daily Emmanuel Hernandez MD   300 mg at 05/26/24 0821    aspirin (ASA) chewable tablet 81 mg  81 mg Oral Daily Emmanuel Hernandez MD   81 mg at 05/26/24 0820    buPROPion (WELLBUTRIN XL) 24 hr tablet 300 mg  300 mg Oral QAM Emmanuel Hernandez MD   300 mg at 05/26/24 0820    calcium carbonate-vitamin D (OSCAL) 500-5 MG-MCG per tablet 1 tablet  1 tablet Oral Daily Shelia Means NP   1 tablet at 05/26/24 0821    iron sucrose (VENOFER) 200 mg in sodium chloride 0.9 % 120 mL intermittent infusion  200 mg Intravenous Q24H Didi Butler PA-C 480 mL/hr at 05/26/24 1136 200 mg at 05/26/24 1136    melatonin tablet 10 mg  10 mg Oral At Bedtime Mirian Oviedo MD   10 mg at 05/25/24 2218    montelukast (SINGULAIR) tablet 10 mg  10 mg Oral At Bedtime Emmanuel Hernandez  MD   10 mg at 05/25/24 2218    mycophenolate (GENERIC EQUIVALENT) capsule 500 mg  500 mg Oral BID IS Didi Butler PA-C   500 mg at 05/26/24 0820    polyethylene glycol (MIRALAX) Packet 17 g  17 g Oral Daily Renata Cepsedes MD   17 g at 05/25/24 0845    rosuvastatin (CRESTOR) tablet 10 mg  10 mg Oral Daily Emmanuel Hernandez MD   10 mg at 05/26/24 0821    tacrolimus (GENERIC EQUIVALENT) capsule 3 mg  3 mg Oral QPM Emmanuel Hernandez MD   3 mg at 05/25/24 1908    tacrolimus (GENERIC EQUIVALENT) capsule 3.5 mg  3.5 mg Oral QAM Emmanuel Hernandez MD   3.5 mg at 05/26/24 0820    warfarin ANTICOAGULANT (COUMADIN) tablet 9 mg  9 mg Oral ONCE at 18:00 Riaz Montaño MD        Warfarin Dose Required Daily - Pharmacist Managed  1 each Oral See Admin Instructions Didi Butler PA-C         Infusion Meds  Current Facility-Administered Medications   Medication Dose Route Frequency Provider Last Rate Last Admin    heparin 25,000 units in 0.45% NaCl 250 mL ANTICOAGULANT infusion  0-5,000 Units/hr Intravenous Continuous Didi Butler PA-C   Stopped at 05/26/24 1511    Reason ACE/ARB/ARNI order not selected   Other DOES NOT GO TO Danay Perez MD        Reason beta blocker not prescribed   Does not apply DOES NOT GO TO Danay Perez MD           ALLERGIES:    Allergies   Allergen Reactions    Grass Shortness Of Breath    Ace Inhibitors Cough    Cats     Dust Mites Other (See Comments)     Asthma    Mold Other (See Comments)     Asthma    Penicillins Other (See Comments)     Unknown - childhood exposure    Tolerated Zosyn 9/18-9/20/2020    Per patient report he has tolerated amoxicillin    Sulfa Antibiotics Other (See Comments) and Unknown     Unknown childhood reaction       REVIEW OF SYSTEMS:  A comprehensive of systems was negative except as noted above.    SOCIAL HISTORY:   Social History     Socioeconomic History    Marital status:      Spouse name: Not on file    Number of children:  Not on file    Years of education: Not on file    Highest education level: Not on file   Occupational History    Not on file   Tobacco Use    Smoking status: Former     Current packs/day: 0.00     Types: Cigarettes     Quit date:      Years since quittin.4     Passive exposure: Never    Smokeless tobacco: Never   Vaping Use    Vaping status: Never Used   Substance and Sexual Activity    Alcohol use: No    Drug use: No    Sexual activity: Yes     Partners: Female   Other Topics Concern    Parent/sibling w/ CABG, MI or angioplasty before 65F 55M? No   Social History Narrative    Not on file     Social Determinants of Health     Financial Resource Strain: High Risk (2023)    Financial Resource Strain     Within the past 12 months, have you or your family members you live with been unable to get utilities (heat, electricity) when it was really needed?: Yes   Food Insecurity: Low Risk  (2023)    Food Insecurity     Within the past 12 months, did you worry that your food would run out before you got money to buy more?: No     Within the past 12 months, did the food you bought just not last and you didn t have money to get more?: No   Transportation Needs: Low Risk  (2023)    Transportation Needs     Within the past 12 months, has lack of transportation kept you from medical appointments, getting your medicines, non-medical meetings or appointments, work, or from getting things that you need?: No   Physical Activity: Not on file   Stress: Not on file   Social Connections: Not on file   Interpersonal Safety: Low Risk  (2023)    Interpersonal Safety     Do you feel physically and emotionally safe where you currently live?: Yes     Within the past 12 months, have you been hit, slapped, kicked or otherwise physically hurt by someone?: No     Within the past 12 months, have you been humiliated or emotionally abused in other ways by your partner or ex-partner?: No   Housing Stability: Low Risk   (2023)    Housing Stability     Do you have housing? : Yes     Are you worried about losing your housing?: No       FAMILY MEDICAL HISTORY:   Family History   Problem Relation Age of Onset    Cerebrovascular Disease Mother 64    Diabetes Mother     Hypertension Mother     Coronary Artery Disease Father     Diabetes Type 2  Father     Obesity Brother     Obesity Brother     Cerebrovascular Disease Daughter 40       PHYSICAL EXAM:   Temp  Av.2  F (36.8  C)  Min: 97.4  F (36.3  C)  Max: 98.8  F (37.1  C)      Pulse  Av.6  Min: 54  Max: 104 Resp  Av.8  Min: 16  Max: 20  SpO2  Av.1 %  Min: 97 %  Max: 100 %       /68 (BP Location: Right arm)   Pulse 95   Temp 98.1  F (36.7  C)   Resp 18   Wt 91.7 kg (202 lb 3.2 oz)   SpO2 96%   BMI 30.74 kg/m       General Appearance: NAD, lying comfortably in bed  Respiratory: CTA bilaterally without crackles or wheezes   Cardiovascular: RRR without murmurs  GI: Soft, nontender, nondistended, normal active bowel sounds  Ext: No BLE   Neuro: A&Ox3  Skin: No rashes or lesions    Date 24 0700 - 24 0659   Shift 7723-5206 6305-2063 0321-4497 24 Hour Total   INTAKE   P.O. 555   555   Shift Total(mL/kg) 555(6.2)   555(6.2)   OUTPUT   Urine 200   200   Shift Total(mL/kg) 200(2.23)   200(2.23)   Weight (kg) 89.5 89.5 89.5 89.5      Admit Weight: 91.4 kg (201 lb 9.6 oz)       LABS:   I have reviewed the following labs:  CMP  Recent Labs   Lab 24  0556 24  1948 24  1716 24  1617 24  0435 24  1537 24  0447 24  1626 24  0511 24  0525 24  0535 24  1403   *  --   --  131* 134* 135 136   < > 137   < > 137 135   POTASSIUM 4.7  --   --  5.0 4.5 4.9 5.0   < > 4.3   < > 4.1 5.1   CHLORIDE 102  --   --  101 103 104 103   < > 104   < > 103 101   CO2 18*  --   --  19* 19* 21* 21*   < > 21*   < > 19* 19*   ANIONGAP 12  --   --  11 12 10 12   < > 12   < > 15 15   GLC 94 123* 182* 111*  103* 75 145*   < > 99   < > 85 124*   BUN 50.3*  --   --  53.2* 57.2* 61.2* 62.5*   < > 70.4*   < > 84.5* 84.9*   CR 2.74*  --   --  2.84* 2.85* 3.05* 2.92*   < > 2.86*   < > 2.92* 3.04*   GFRESTIMATED 25*  --   --  24* 24* 22* 23*   < > 24*   < > 23* 22*   QAMAR 9.0  --   --  8.9 8.9 8.6* 9.1   < > 8.9   < > 8.4* 8.8   MAG 2.2  --   --   --  2.3  --  2.5*  --  2.1   < > 2.0 2.3   PHOS  --   --   --   --   --   --   --   --  4.6*  --   --   --    PROTTOTAL  --   --   --   --   --   --   --   --   --   --  6.1* 7.1   ALBUMIN  --   --   --   --   --   --   --   --   --   --  3.7 4.4   BILITOTAL  --   --   --   --   --   --   --   --   --   --  0.5 0.6   ALKPHOS  --   --   --   --   --   --   --   --   --   --  110 132   AST  --   --   --   --   --   --   --   --   --   --  34 48*   ALT  --   --   --   --   --   --   --   --   --   --  38 49    < > = values in this interval not displayed.     CBC  Recent Labs   Lab 05/26/24  0556 05/25/24  0435 05/24/24  0447 05/23/24  1026 05/23/24  1025 05/23/24  0511   HGB 9.9* 9.6* 11.0* 10.2*   < > 10.0*   WBC 6.0 5.3 5.9  --   --  5.5   RBC 3.42* 3.25* 3.69*  --   --  3.36*   HCT 32.3* 30.9* 34.9*  --   --  31.5*   MCV 94 95 95  --   --  94   MCH 28.9 29.5 29.8  --   --  29.8   MCHC 30.7* 31.1* 31.5  --   --  31.7   RDW 13.8 13.6 13.8  --   --  13.8    187 223  --   --  223    < > = values in this interval not displayed.     INR  Recent Labs   Lab 05/26/24  0556 05/25/24  0435 05/24/24  0447 05/23/24 2057 05/23/24  1626   INR 1.60* 1.40* 1.26*  --  1.30*   PTT  --   --   --  87*  --      ABGNo lab results found in last 7 days.   URINE STUDIES  Recent Labs   Lab Test 05/21/24  1306 05/20/24  1351 03/21/24  1007 01/06/24  1351   COLOR Light Yellow Straw Yellow Light Yellow   APPEARANCE Clear Clear Clear Clear   URINEGLC Negative Negative Negative Negative   URINEBILI Negative Negative Negative Negative   URINEKETONE Trace* Negative Negative Negative   SG 1.015 1.007 1.015  1.017   UBLD Negative Negative Trace* Moderate*   URINEPH 5.5 6.5 5.5 5.5   PROTEIN Negative Negative Trace* 50*   UROBILINOGEN  --   --  0.2  --    NITRITE Negative Negative Negative Negative   LEUKEST Negative Negative Trace* Negative   RBCU <1 0 0-2 1   WBCU <1 <1 5-10* 1     No lab results found.  PTH  Recent Labs   Lab Test 03/28/23  1129   PTHI 529*     IRON STUDIES  Recent Labs   Lab Test 05/23/24  0511 08/05/21  0931 05/13/21  0534 02/09/21  0518 01/21/21  1350 09/10/20  0830 09/19/19  1129 07/31/19  1050 07/03/19  0828 11/20/18  0934 10/19/18  1007 07/06/18  0856 05/24/17  0652   IRON 16* 30* 38 28* 36 29* 139 40 51 48 43 34* 63    240 215* 285 381 294 348 367 367 353 341 430 248   IRONSAT 5* 13* 18 10* 10* 10* 40 11* 14* 14* 13* 8* 25   VITA 24* 98 98 33  --   --   --  41 46 71  --  45 200       IMAGING:  US renal  IMPRESSION: No hydronephrosis. Simple left renal cyst.      William Aburto MD

## 2024-05-26 NOTE — PROGRESS NOTES
[facility]   Cardiology II Service / Advanced Heart Failure  Daily Progress Note      Patient: Jim Willingham  MRN: [id number]  Admission Date: 5/20/2024  Hospital Day # 6    Assessment and Plan:   Mr. Jim Willingham is a 68yr old male with a history of NICM (s/p HM2 LVAD 6/2017) s/p OHT 5/6/21, VT, AFib, CKD, DMII, and COPD who presented to the ED for increased SOB, where he was found to have a PE.      PLAN:  - encourage po intake  - repeat tacro level on Tuesday      NICM, s/p OHT 5/6/21   His post-transplant course was c/b right pleural air leak (required prolonged chest tube), pAFib, CAP (RLL, 6/2021), recurrent pleural effusions (s/p thoracenteses x2 6/2021 and 7/2021, exudative, repeatedly cultured negative), RLL cavitary lesions (per chest CT 7/14/21, BD glucan indeterminate, aspergillus negative, not started on antifungals), pericardial effusion (1.4cm per TTE 7/12/21, conservatively managed), low-grade EBV viremia, recurrent/persistent gout flare, transaminase elevation with questionable choledocholithiasis (conservatively managed with resolution), COVID-19 (8/2021, s/p monoclonal antibodies and remdesivir x5 days), and 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    Rejection history:  none, last bx 5/23/24 was negative for ACR, AMR, and capillary staining  AlloMap scores:  < 35 (last 5/2023)  DSAs:  new DSAs noted 5/20/24 (DQB9, low-level)  Coronary angio/Ischemic eval:  Coronary angio 5/2023 showed minimal CAD (20% stenosis in RCA, stable from prior), and was negative for obstruction.  Last RHC:  5/2024 showed normal biventricular filling pressures with RA 10, mPA 25, PCW 15, and CI 2.48.  Echo/cMRI:  TTE 5/2024 showed stable graft function, with LVEF 55-60% and normal RV size/function.     Serostatus:  - CMV D+/R+  - EBV D+/R+  - Toxo D-/R-     Immunosuppression:  - MMF increased to 500mg twice daily on 5/23/24 due to new DSAs (PTA dose was 250mg BID, lowered  dose due to multiple pneumonias)  - tacro, goal level 4-6.  Tacro level was 5 on 5/24 (10.5hr trough), will repeat level on Tuesday.     PPx:  - CAV:  Aspirin 81mg daily and rosuvastatin 10mg daily.  - GI: Pantoprazole 40mg daily  - Osteoporosis: restarting Calcium/vitamin D supplements, it does not appear that he was taking these PTA     Graft function:  - BPs: Controlled, not on antihypertensives  - fluid status: Euvolemic, now off diuretics and received some IVF 5/24 and 5/25 due to orthostasis, encouraging po intake      Episodic junctional rhythm  Bradycardia  Has demonstrated intermittent runs of junctional rhythms and bradycardia, with HRs in the 50-60s.  He is feeling some SOB, but otherwise remains stable.  BPs are stable.  - urgent EP consult   - zio patch at discharge  - follow-up EP AFSANEH after Zio    Orthostatic hypotension  Has received IVF 5/24 and 5/25.  - encourage po fluid intake    Pulmonary Embolism, low risk  Elevated D-Dimer 2.11  Started to dyspnea and fatigue ~10 days prior to admission, noting that he acutely could ambulate across the room without dyspnea.  In the ED, D-dimer was elevated, NTproBNP elevated at 18,909 (in the setting of WALTER), creat 3.  V/Q scan showed findings of wedge shaped defects in basilar segments of lower right lobe, concerning for pulmonary embolism as per PISAPED criteria.  TTE was negative for RV Dysfunction.  Denies prior hx of PE, DVT, and no imaging findings suggestive of DVT.   - continue High intensity heparin gtt   - warfarin started 5/23, will need to continue to bridge with heparin to a therapeutic INR.  Would continue heparin gtt until renal function improves to a point that we can safely use lovenox  - Will need minimum of 3 months of anticoagulation.  Pharmacy consulted for medication coverage, note that apixaban and rivaroxaban have high co-pays, so will continue warfarin with goal INR 2-3.  - Recommend outpatient cancer screening given unprovoked PE  -  Per Dr. Montaño, no need to consider more invasive intervention on the PE.    Headache  Has been having intermittent headaches over the last 10 days.  No neuro features, but it is not resolving and it has become more persistent, and is worse in the morning when he wakes up.  Brain MRI from 5/24 was negative for acute pathology.  - encourage po hydration  - continue prn tylenol    WALTER on CKD, improving  Baseline creat ~1.4.  On admission here, was 3s.  Initially felt to be cardiorenal d/t reported weight gain, but he did not demonstrate any significant improvement with IV lasix.  No protein in urine to suggest glomerular process.  FeURea suggests pre-renal cause.  Renal ultrasound was negative for hydronephrosis and obstruction.  Spun urine was bland per renal.  Felt to be less likely embolic d/t no wedge infarcts on CT imaging.  LDH WNL on 5/24, haptoglobin WNL on 5/25.  RHC showed euvolemia as noted above.  He has had some orthostasis, so received IVF on 5/24 and 5/25, and have encouraged po intake since.  - Appreciate renal consult  - encourage po hydration (last diuretic dose was 5/22)  - Trial of fluids today as above, no further diuretics planned (last dose 5/22)  - continue tacro as above  - renally dose meds, avoid nephrotoxic meds  - no current indication for renal biopsy     Normocytic Anemia  Baseline Hgb ~11, remains stable. Suspect anemia of renal disease  - Trend CBC  - IV iron course ordered for 5/24-5/28  - will consider referral to outpatient anemia clinic     Elevated Troponin  116 --> 112, suspect troponin leak in setting of PE and kidney injury.  However, given RV not showing any dysfunction hard to say if it is proportion or not to his PE severity.  Biopsy was negative for rejection.    Constipation  Having intermittent firm BMS.  - Mirilax daily  - Ducolax suppository prn  - Has not felt that senna has been helpful      Medication induced antiplatelet effect - on pta aspirin  Immunocompromised  Host - on tacrolimus and mmf  Prediabetes - A1c 5.8%        Diet: Regular adult diet, no fluid restriction   DVT Prophylaxis: Heparin SQ  Russell Catheter: Not present  Cardiac Monitoring: ACTIVE   Code Status: Full Code      Medically Ready for Discharge: Anticipated in 2-4 Days    ================================================================    Subjective/24-Hr Events:   Mr. Willingham states that he continues to feel the changes in his heart rhythm, noting that he gets headaches and feels lightheaded.  He has been trying to ambulate, but these symptoms do limit him.  He remains somewhat SOB, which is stable.  He otherwise denies chest pain, nausea, vomiting, diarrhea, and constipation.  He notes a 5# weight gain, but does not feel fluid retention/bloating.    Last 24 hr care team notes reviewed.  ROS:  4 point ROS including respiratory, CV, GI and  (other than that noted in the HPI) is negative.     Medications: Reviewed in EPIC.     Physical Exam:   BP 99/64 (BP Location: Left arm)   Pulse 93   Temp 98.7  F (37.1  C) (Oral)   Resp 18   Wt 91.7 kg (202 lb 3.2 oz)   SpO2 96%   BMI 30.74 kg/m      GENERAL: Appears comfortable, in no distress, lying in bed.  HEENT: Eye symmetrical, no discharge or icterus bilaterally.   NECK: Supple, JVD negative when sitting upright   CV: RRR, +S1S2, no murmur, rub, or gallop.   RESPIRATORY: Respirations regular, even, and unlabored. Lungs CTA throughout.    GI: Soft and non distended   EXTREMITIES: No LE edema. All extremities are warm and well perfused  NEUROLOGIC: Alert and interacting appropriately. No focal deficits.   SKIN: No open lesions, rashes, or jaundice.    Labs:  CMP  Recent Labs   Lab 05/26/24  0556 05/25/24  1948 05/25/24  1716 05/25/24  1617 05/25/24  0435 05/24/24  1537 05/24/24  0447 05/23/24  1626 05/23/24  0511 05/22/24  0525 05/21/24  0535 05/20/24  1403   *  --   --  131* 134* 135 136   < > 137   < > 137 135   POTASSIUM 4.7  --   --  5.0 4.5 4.9 5.0    < > 4.3   < > 4.1 5.1   CHLORIDE 102  --   --  101 103 104 103   < > 104   < > 103 101   CO2 18*  --   --  19* 19* 21* 21*   < > 21*   < > 19* 19*   ANIONGAP 12  --   --  11 12 10 12   < > 12   < > 15 15   GLC 94 123* 182* 111* 103* 75 145*   < > 99   < > 85 124*   BUN 50.3*  --   --  53.2* 57.2* 61.2* 62.5*   < > 70.4*   < > 84.5* 84.9*   CR 2.74*  --   --  2.84* 2.85* 3.05* 2.92*   < > 2.86*   < > 2.92* 3.04*   GFRESTIMATED 25*  --   --  24* 24* 22* 23*   < > 24*   < > 23* 22*   QAMAR 9.0  --   --  8.9 8.9 8.6* 9.1   < > 8.9   < > 8.4* 8.8   MAG 2.2  --   --   --  2.3  --  2.5*  --  2.1   < > 2.0 2.3   PHOS  --   --   --   --   --   --   --   --  4.6*  --   --   --    PROTTOTAL  --   --   --   --   --   --   --   --   --   --  6.1* 7.1   ALBUMIN  --   --   --   --   --   --   --   --   --   --  3.7 4.4   BILITOTAL  --   --   --   --   --   --   --   --   --   --  0.5 0.6   ALKPHOS  --   --   --   --   --   --   --   --   --   --  110 132   AST  --   --   --   --   --   --   --   --   --   --  34 48*   ALT  --   --   --   --   --   --   --   --   --   --  38 49    < > = values in this interval not displayed.       CBC  Recent Labs   Lab 05/26/24  0556 05/25/24  0835 05/24/24  0257 05/23/24  1026 05/23/24  1025 05/23/24  0511   WBC 6.0 5.3 5.9  --   --  5.5   RBC 3.42* 3.25* 3.69*  --   --  3.36*   HGB 9.9* 9.6* 11.0* 10.2*   < > 10.0*   HCT 32.3* 30.9* 34.9*  --   --  31.5*   MCV 94 95 95  --   --  94   MCH 28.9 29.5 29.8  --   --  29.8   MCHC 30.7* 31.1* 31.5  --   --  31.7   RDW 13.8 13.6 13.8  --   --  13.8    187 223  --   --  223    < > = values in this interval not displayed.       INR  Recent Labs   Lab 05/26/24  0556 05/25/24  0435 05/24/24  0447 05/23/24  1626   INR 1.60* 1.40* 1.26* 1.30*       The above was reviewed with Dr. Montgomery.      Shelia Means, JOSE, [facility]-BC  Advanced Heart Failure Nurse Practitioner  [facility]

## 2024-05-27 NOTE — PROGRESS NOTES
"D: fluid excess, found PE during hospitalization     I: Monitored vitals and assessed pt status.   Changed: Xa therapeutic X2, INR 2.1   Runnin units/hr  PRN: benadryl, inhaler, and duoneb X1   Tele: SR w/ 1AVB, intermittent junctional rhythms   O2: RA  Mobility: SBA    Neuro: A0x 4, baseline neuropathy  Cardiac:intermittent junctional rhythms  Resp:pt. Complained of slight SOB at beginning of night, benadryl, inhaler, and duoneb given and improvement occurred  GI/:, intermittent nausea, voids spontaneously   Diet:regular   Skin:n/a  LADs:2 PIV R arm (hep running in one)    Discharge: Writer: \"The providers estimate that you will be ready for discharge on unknown date. What is your expectation for discharge?\"  Patient responded: Pt is unsure of discharge as team is still deciding on Tx    Need to knows:  -multiple times junctional rhythms occurred   -creatinine 3.1   -next Xa  morning labs      P: Continue to monitor Pt status and report changes to CARDS 2.  "

## 2024-05-27 NOTE — PROGRESS NOTES
[facility]   Cardiology II Service / Advanced Heart Failure  Daily Progress Note      Patient: Jim Willingham  MRN: [id number]  Admission Date: 5/20/2024  Hospital Day # 7    Assessment and Plan:   Mr. Jim Willingham is a 68yr old male with a history of NICM (s/p HM2 LVAD 6/2017) s/p OHT 5/6/21, VT, AFib, CKD, DMII, and COPD who presented to the ED for increased SOB, where he was found to have a PE.      PLAN:  - Held heparin gtt, INR therapeutic  - Will order AL amyloid labs to exclude  - Reinvolve EP for ? PPM question  - Repeat limited TTE to check function and IVC  - Lasix 40 mg IV once      NICM, s/p OHT 5/6/21   His post-transplant course was c/b right pleural air leak (required prolonged chest tube), pAFib, CAP (RLL, 6/2021), recurrent pleural effusions (s/p thoracenteses x2 6/2021 and 7/2021, exudative, repeatedly cultured negative), RLL cavitary lesions (per chest CT 7/14/21, BD glucan indeterminate, aspergillus negative, not started on antifungals), pericardial effusion (1.4cm per TTE 7/12/21, conservatively managed), low-grade EBV viremia, recurrent/persistent gout flare, transaminase elevation with questionable choledocholithiasis (conservatively managed with resolution), COVID-19 (8/2021, s/p monoclonal antibodies and remdesivir x5 days), and 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    Rejection history:  none, last bx 5/23/24 was negative for ACR, AMR, and capillary staining  AlloMap scores:  < 35 (last 5/2023)  DSAs:  new DSAs noted 5/20/24 (DQB9, low-level)  Coronary angio/Ischemic eval:  Coronary angio 5/2023 showed minimal CAD (20% stenosis in RCA, stable from prior), and was negative for obstruction.  Last RHC:  5/2024 showed normal biventricular filling pressures with RA 10, mPA 25, PCW 15, and CI 2.48.  Echo/cMRI:  TTE 5/2024 showed stable graft function, with LVEF 55-60% and normal RV size/function. Will repeat TTE today for graft function and IVC  check     Serostatus:  - CMV D+/R+  - EBV D+/R+  - Toxo D-/R-     Immunosuppression:  - MMF increased to 500mg twice daily on 5/23/24 due to new DSAs (PTA dose was 250mg BID, lowered dose due to multiple pneumonias)  - tacro, goal level 4-6.  Tacro level was 5 on 5/24 (10.5hr trough), will repeat level on Tuesday.     PPx:  - CAV:  Aspirin 81mg daily and rosuvastatin 10mg daily.  - GI: Pantoprazole 40mg daily  - Osteoporosis: restarting Calcium/vitamin D supplements, it does not appear that he was taking these PTA     Graft function:  - BPs: Controlled, not on antihypertensives  - fluid status: Euvolemic, now off diuretics and received some IVF 5/24 and 5/25 due to orthostasis, encouraging po intake      Episodic junctional rhythm  Bradycardia  Has demonstrated intermittent runs of junctional rhythms and bradycardia, with HRs in the 50-60s.  He is feeling some SOB, but otherwise remains stable.  BPs are stable.  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  - encourage po fluid intake    Pulmonary Embolism, low risk  Elevated D-Dimer 2.11  Started to dyspnea and fatigue ~10 days prior to admission, noting that he acutely could ambulate across the room without dyspnea.  In the ED, D-dimer was elevated, NTproBNP elevated at 18,909 (in the setting of WALTER), creat 3.  V/Q scan showed findings of wedge shaped defects in basilar segments of lower right lobe, concerning for pulmonary embolism as per PISAPED criteria.  TTE was negative for RV Dysfunction.  Denies prior hx of PE, DVT, and no imaging findings suggestive of DVT.   - Hold  High intensity heparin gtt; INR therapeutic, continue Warfarin  - warfarin started 5/23,  - Will need minimum of 3 months of anticoagulation.  Pharmacy consulted for medication coverage, note that apixaban and rivaroxaban have high co-pays, so will continue warfarin with goal INR 2-3.  - Recommend outpatient cancer screening given unprovoked PE  - Per Dr. Montaño, no need to consider more invasive intervention on the PE.    Headache  Has been having intermittent headaches over the last 10 days.  No neuro features, but it is not resolving and it has become more persistent, and is worse in the morning when he wakes up.  Brain MRI from 5/24 was negative for acute pathology.  - encourage po hydration  - continue prn tylenol    WALTER on CKD, improving  Baseline creat ~1.4.  On admission here, was 3s.  Initially felt to be cardiorenal d/t reported weight gain, but he did not demonstrate any significant improvement with IV lasix.  No protein in urine to suggest glomerular process.  FeURea suggests pre-renal cause.  Renal ultrasound was negative for hydronephrosis and obstruction.  Spun urine was bland per renal.  Felt to be less likely embolic d/t no wedge infarcts on CT imaging.  LDH WNL on 5/24, haptoglobin WNL on 5/25.  RHC showed euvolemia as noted above.  He has had some orthostasis, so received IVF on 5/24 and 5/25, and have encouraged po intake since.  - Appreciate renal consult  - encourage po hydration (last diuretic dose was 5/22)  - Trial of fluids today as above, no further diuretics planned (last dose 5/22)  - continue tacro as above  - renally dose meds, avoid nephrotoxic meds  - no current indication for renal biopsy     Normocytic Anemia  Baseline Hgb ~11, remains stable. Suspect anemia of renal disease  - Trend CBC  - IV iron course ordered for 5/24-5/28  - will consider referral to outpatient anemia clinic     Elevated Troponin  116 --> 112, suspect troponin leak in setting of PE and kidney injury.   However, given RV not showing any dysfunction hard to say if it is proportion or not to his PE severity.  Biopsy was negative for rejection.    Constipation  Having intermittent firm BMS.  - Mirilax daily  - Ducolax suppository prn  - Has not felt that senna has been helpful      Medication induced antiplatelet effect - on pta aspirin  Immunocompromised Host - on tacrolimus and mmf  Prediabetes - A1c 5.8%        Diet: Regular adult diet, no fluid restriction   DVT Prophylaxis: Heparin SQ  Russell Catheter: Not present  Cardiac Monitoring: ACTIVE   Code Status: Full Code      Medically Ready for Discharge: Anticipated in 2-4 Days    ================================================================    Subjective/24-Hr Events:   Mr. Willingham states that he continues to feel the changes in his heart rhythm, noting that he gets headaches and feels lightheaded.  He has been trying to ambulate, but these symptoms do limit him.  He remains somewhat SOB, which is stable.  He otherwise denies chest pain, nausea, vomiting, diarrhea, and constipation.  He notes a 5# weight gain, but does not feel fluid retention/bloating.    Last 24 hr care team notes reviewed.  ROS:  4 point ROS including respiratory, CV, GI and  (other than that noted in the HPI) is negative.     Medications: Reviewed in EPIC.     Physical Exam:   /82 (BP Location: Right arm)   Pulse 93   Temp 97.7  F (36.5  C) (Oral)   Resp 18   Wt 92.1 kg (203 lb 1.6 oz)   SpO2 99%   BMI 30.88 kg/m      GENERAL: Appears comfortable, in no distress, lying in bed.  HEENT: Eye symmetrical, no discharge or icterus bilaterally.   NECK: Supple, JVD negative when sitting upright   CV: RRR, +S1S2, no murmur, rub, or gallop.   RESPIRATORY: Respirations regular, even, and unlabored. Lungs CTA throughout.    GI: Soft and non distended   EXTREMITIES: No LE edema. All extremities are warm and well perfused  NEUROLOGIC: Alert and interacting appropriately. No focal deficits.    SKIN: No open lesions, rashes, or jaundice.    Labs:  [facility]  Recent Labs   Lab 05/27/24  0526 05/26/24  0556 05/25/24  1948 05/25/24  1716 05/25/24  1617 05/25/24  0435 05/24/24  1537 05/24/24  0447 05/23/24  1626 05/23/24  0511 05/22/24  0525 05/21/24  0535   * 132*  --   --  131* 134*   < > 136   < > 137   < > 137   POTASSIUM 5.1 4.7  --   --  5.0 4.5   < > 5.0   < > 4.3   < > 4.1   CHLORIDE 102 102  --   --  101 103   < > 103   < > 104   < > 103   CO2 19* 18*  --   --  19* 19*   < > 21*   < > 21*   < > 19*   ANIONGAP 12 12  --   --  11 12   < > 12   < > 12   < > 15   GLC 87 94 123* 182* 111* 103*   < > 145*   < > 99   < > 85   BUN 50.3* 50.3*  --   --  53.2* 57.2*   < > 62.5*   < > 70.4*   < > 84.5*   CR 3.07* 2.74*  --   --  2.84* 2.85*   < > 2.92*   < > 2.86*   < > 2.92*   GFRESTIMATED 22* 25*  --   --  24* 24*   < > 23*   < > 24*   < > 23*   QAMAR 8.8 9.0  --   --  8.9 8.9   < > 9.1   < > 8.9   < > 8.4*   MAG 2.3 2.2  --   --   --  2.3  --  2.5*  --  2.1   < > 2.0   PHOS  --   --   --   --   --   --   --   --   --  4.6*  --   --    PROTTOTAL  --   --   --   --   --   --   --   --   --   --   --  6.1*   ALBUMIN  --   --   --   --   --   --   --   --   --   --   --  3.7   BILITOTAL  --   --   --   --   --   --   --   --   --   --   --  0.5   ALKPHOS  --   --   --   --   --   --   --   --   --   --   --  110   AST  --   --   --   --   --   --   --   --   --   --   --  34   ALT  --   --   --   --   --   --   --   --   --   --   --  38    < > = values in this interval not displayed.       CBC  Recent Labs   Lab 05/27/24 0526 05/26/24  0556 05/25/24 0435 05/24/24 0447   WBC 6.0 6.0 5.3 5.9   RBC 3.38* 3.42* 3.25* 3.69*   HGB 9.9* 9.9* 9.6* 11.0*   HCT 32.0* 32.3* 30.9* 34.9*   MCV 95 94 95 95   MCH 29.3 28.9 29.5 29.8   MCHC 30.9* 30.7* 31.1* 31.5   RDW 13.9 13.8 13.6 13.8    206 187 223       INR  Recent Labs   Lab 05/27/24 0526 05/26/24  0556 05/25/24 0435 05/24/24 0447   INR 2.10* 1.60* 1.40*  1.26*       The above was reviewed with Dr. Montgomery.    Rob Gutierrez MD PhD  Cardiology Fellow

## 2024-05-27 NOTE — PROGRESS NOTES
Nephrology progress note   05/27/2024      Jim Willingham MRN:[id number] YOB: 1955  Date of Admission:5/20/2024  Primary care provider: Ricardo Gómez  Requesting physician: Riaz Montaño MD    ASSESSMENT AND RECOMMENDATIONS:   Jim Willingham is a 69 yo male with a history of NICM (s/p HM2 LVAD 6/2017) s/p OHT 5/6/21, gastric bypass, CKD 3, DM type II, gout, and COPD who presented for RESENDIZ and was found to have a PE. Nephrology was consulted for WALTER.      WALTER on CKD stage 3b  Etiology still unclear, though has had a couple days of very gradual improvement in Cr with holding lasix and prn IV fluids.   CKD was diagnosed years ago; pt does not recall being told the etiology. May be due to pt's DM, HTN, or past cardiorenal from heart failure. Baseline Cr ~1.4-1.8. Cr 3 on admission. Initial workup with bland UA, no hydronephrosis on renal US, most recent tacrolimus level 4.8. New DSA noted, but RHC today reveal RA 10, wedge 15. Reported baseline weight 192-195 lbs. Cr improved to 2.8 with diuretics but has not improved further thus far. Pt was hypotensive with SBP 80s on 5/22 but Cr did not change much after this episode. Suspect initially prerenal/cardiorenal vs EMILY (with tacro use), then possible over-diuresis with Lasix.   At this point we still think the main mechanism behind his WALTER is impaired renal autoregulation on a background of pertinent cardiac HX and being chronically on tac   Spin urine, bland UA   Uric acid at  5.3  - Orthostatic testing is negative after successive 250ml boluses over 3 days    - Hold Lasix for today   Negative  LDH and haptoglobin (ordered to rule out local TMA in his kidney though unlikely as HB and PLT are stable)  -C/w low K diet   -low BK viral levels at 4k unlikely WALTER is related to BK nephropathy given lack of hematuria and low titers   -will spin UA 5/27 and upload to epic media       # Status post OHT on 5/2021, history of NICM  # Chronic  immunosuppression  DSAs:  NEW DSA noted this admission   -follow-up heart biopsy results     Normocytic anemia, chronic  Baseline hgb ~11. Hgb 10 at the time of consult without overt signs of bleeding. Pt took venofir in the past.   - Ordered iron studies showing iron 16 and iron sat 5%  -s/p venofer by primary team       BMD  Mild hyperphosphatemia likely iso WALTER.  - CTM      Recommendations were communicated to primary team via verbal/note    Seen and discussed with Dr. Renny Aburto MD         REASON FOR CONSULT: WALTER     Interval events   Cr 2.9 to 2.7 to 3  Still euvolemic   Off heparin drip   Weight is 89.5 to 89.8 to 91.1 to 91.7 to 92.1  No lasix yesterday or today  No orthostasis today     Since [place]      PAST MEDICAL HISTORY:  Reviewed with patient on 05/27/2024     Past Medical History:   Diagnosis Date    Bariatric surgery status 2003    Benign essential hypertension 05/11/2017    Bilateral carpal tunnel syndrome 11/02/2020    BPH with obstruction/lower urinary tract symptoms 03/21/2024    Cardiomyopathy, unspecified (H) 05/08/2017    CKD (chronic kidney disease) stage 3, GFR 30-59 ml/min (H) 05/11/2017    COPD (chronic obstructive pulmonary disease) (H) 11/02/2020    Depression 05/11/2017    Diabetes mellitus (H) 1995    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    Generalized osteoarthritis 09/26/2023    Gouty arthropathy, chronic, without tophi 11/02/2020    H/O adenomatous polyp of colon 08/03/2016    2 polyps    H/O gastric bypass 05/11/2017    History of type 2 diabetes mellitus 03/28/2023    Hyperlipidemia LDL goal <70 09/27/2022    ICD (implantable cardioverter-defibrillator), biventricular, in situ 05/11/2017    IFG (impaired fasting glucose) 09/26/2023    LVAD (left ventricular assist device) present (H)     Major depression, recurrent, chronic (H24) 11/02/2020    Mild anemia 03/18/2022    NICM (nonischemic cardiomyopathy) (H)/ EF 20% 05/11/2017    ECHO: LVEDd. 7.66 cm,  Restrictive pattern , Severe mitral valve regurgitation    CECILIA (obstructive sleep apnea) 05/11/2017    Paroxysmal atrial fibrillation (H) 05/11/2017    Paroxysmal VT (H) 05/11/2017    Peripheral polyneuropathy 03/28/2023    Postsurgical dumping syndrome 03/21/2024    Pulmonary cavitary lesion 11/18/2021    Rotator cuff tear arthropathy of both shoulders 11/02/2020    Type 2 diabetes mellitus with diabetic polyneuropathy (H) 03/18/2022    Uncomplicated asthma     Vitamin B12 deficiency (non anemic) 05/11/2017       Past Surgical History:   Procedure Laterality Date    ANESTHESIA CARDIOVERSION N/A 05/11/2020    Procedure: ANESTHESIA, FOR CARDIOVERSION @1100;  Surgeon: GENERIC ANESTHESIA PROVIDER;  [facility]    BRONCHOSCOPY (RIGID OR FLEXIBLE), DIAGNOSTIC N/A 8/30/2021    Procedure: BRONCHOSCOPY, WITH BRONCHOALVEOLAR LAVAGE;  Surgeon: Perlman, David Morris, MD;  [facility]    COLONOSCOPY N/A 4/18/2024    Procedure: COLONOSCOPY, WITH POLYPECTOMY;  Surgeon: Jermaine Root MD;  [facility]    CV CORONARY ANGIOGRAM N/A 5/17/2022    Procedure: Coronary Angiogram;  Surgeon: Jeffrey Gibson MD;  [facility]    CV CORONARY ANGIOGRAM N/A 5/23/2023    Procedure: Coronary Angiogram;  Surgeon: Scout Robins MD;  [facility]    CV HEART BIOPSY N/A 5/13/2021    Procedure: Heart Biopsy;  Surgeon: Scout Robins MD;  [facility]    CV HEART BIOPSY N/A 5/20/2021    Procedure: Heart Biopsy;  Surgeon: Jeffrey Gibson MD;  [facility]    CV HEART BIOPSY N/A 5/27/2021    Procedure: Heart Biopsy;  Surgeon: Jac Dover MD;  [facility]    CV HEART BIOPSY N/A 6/7/2021    Procedure: CV HEART BIOPSY;  Surgeon: Jeffrey Gibson MD;  [facility]    CV HEART BIOPSY N/A 6/21/2021    Procedure: CV HEART BIOPSY;   Surgeon: Scout Robins MD;  [facility]    CV HEART BIOPSY N/A 7/5/2021    Procedure: CV HEART BIOPSY;  Surgeon: Jeffrey iGbson MD;  [facility]    CV HEART BIOPSY N/A 7/16/2021    Procedure: Heart Biopsy;  Surgeon: Amadeo Art MD;  [facility]    CV HEART BIOPSY N/A 8/5/2021    Procedure: Heart Biopsy;  Surgeon: Jeffrey Gibson MD;  [facility]    CV HEART BIOPSY N/A 8/31/2021    Procedure: Heart Cath Heart Biopsy;  Surgeon: Jeffrey Gibson MD;  [facility]    CV HEART BIOPSY N/A 9/21/2021    Procedure: CV HEART BIOPSY;  Surgeon: Jeffrey Gibson MD;  [facility]    CV HEART BIOPSY N/A 10/5/2021    Procedure: CV HEART BIOPSY;  Surgeon: Jeffrey Gibson MD;  [facility]    CV HEART BIOPSY N/A 11/4/2021    Procedure: CV HEART BIOPSY;  Surgeon: Nicola Seth MD;  Location:  [facility]    CV HEART BIOPSY N/A 5/17/2022    Procedure: Heart Biopsy;  Surgeon: Jeffrey Gibson MD;  Location:  [facility]    CV HEART BIOPSY N/A 5/23/2023    Procedure: Heart Biopsy;  Surgeon: Scout Robins MD;  [facility]    CV HEART BIOPSY N/A 5/23/2024    Procedure: Heart Biopsy;  Surgeon: Precious Bautista MD;  Location:  [facility]    CV RIGHT HEART CATH MEASUREMENTS RECORDED N/A 07/24/2019    Procedure: CV RIGHT HEART CATH;  Surgeon: Renu Sears MD;  Location:  [facility]    CV RIGHT HEART CATH MEASUREMENTS RECORDED N/A 08/05/2020    Procedure: CV RIGHT HEART CATH;  Surgeon: Nicola Seth MD;  Location:  [facility]    CV RIGHT HEART CATH MEASUREMENTS RECORDED N/A 01/07/2021    Procedure: CV RIGHT HEART CATH;  Surgeon: Jac Dover MD;  Location:   [facility]    CV RIGHT HEART CATH MEASUREMENTS RECORDED N/A 02/23/2021    Procedure: Heart Cath Right Heart Cath;  Surgeon: Jeffrey Gibson MD;  Location:  [facility]    CV RIGHT HEART CATH MEASUREMENTS RECORDED N/A 03/23/2021    Procedure: Heart Cath Right Heart Cath. request for 3/23;  Surgeon: Jeffrey Gibson MD;  Location:  [facility]    CV RIGHT HEART CATH MEASUREMENTS RECORDED N/A 5/13/2021    Procedure: Right Heart Cath;  Surgeon: Scout Robins MD;  Location:  [facility]    CV RIGHT HEART CATH MEASUREMENTS RECORDED N/A 5/20/2021    Procedure: Right Heart Cath;  Surgeon: Jeffrey Gibson MD;  Location:  [facility]    CV RIGHT HEART CATH MEASUREMENTS RECORDED N/A 5/27/2021    Procedure: Right Heart Cath;  Surgeon: Jac Dover MD;  Location:  [facility]    CV RIGHT HEART CATH MEASUREMENTS RECORDED N/A 6/7/2021    Procedure: CV RIGHT HEART CATH;  Surgeon: Jeffrey Gibson MD;  Location:  [facility]    CV RIGHT HEART CATH MEASUREMENTS RECORDED N/A 6/21/2021    Procedure: CV RIGHT HEART CATH;  Surgeon: Scout Robins MD;  Location:  [facility]    CV RIGHT HEART CATH MEASUREMENTS RECORDED N/A 7/5/2021    Procedure: CV RIGHT HEART CATH;  Surgeon: Jeffrey Gibson MD;  Location:  [facility]    CV RIGHT HEART CATH MEASUREMENTS RECORDED N/A 7/16/2021    Procedure: Right Heart Cath;  Surgeon: Amadeo Art MD;  Location:  [facility]    CV RIGHT HEART CATH MEASUREMENTS RECORDED N/A 8/5/2021    Procedure: CV RIGHT HEART CATH;  Surgeon: Jeffrey Gibson MD;  Location:  [facility]    CV RIGHT HEART CATH MEASUREMENTS RECORDED N/A 8/31/2021    Procedure: Heart Cath Right Heart Cath;  Surgeon: Jeffrey Gibson MD;  Location:  [facility]     CV RIGHT HEART CATH MEASUREMENTS RECORDED N/A 9/21/2021    Procedure: CV RIGHT HEART CATH;  Surgeon: Jeffrey Gibson MD;  Location:  [facility]    CV RIGHT HEART CATH MEASUREMENTS RECORDED N/A 10/5/2021    Procedure: CV RIGHT HEART CATH;  Surgeon: Jeffrey Gibson MD;  Location:  [facility]    CV RIGHT HEART CATH MEASUREMENTS RECORDED N/A 11/4/2021    Procedure: CV RIGHT HEART CATH;  Surgeon: Nicola Seth MD;  Location:  [facility]    CV RIGHT HEART CATH MEASUREMENTS RECORDED N/A 5/17/2022    Procedure: Right Heart Catheterization;  Surgeon: Jeffrey Gibson MD;  Location:  [facility]    CV RIGHT HEART CATH MEASUREMENTS RECORDED N/A 5/23/2023    Procedure: Right Heart Catheterization;  Surgeon: Scout Robins MD;  Location:  [facility]    CV RIGHT HEART CATH MEASUREMENTS RECORDED N/A 5/23/2024    Procedure: Right Heart Cath- pre noon with rush path;  Surgeon: Precious Bautista MD;  Location:  [facility]    GI SURGERY  2003    [facility]    INSERT VENTRICULAR ASSIST DEVICE LEFT (HEARTMATE II) N/A 06/19/2017    Procedure: INSERT VENTRICULAR ASSIST DEVICE LEFT (HEARTMATE II);  Median Sternotomy Heartmate II Left Ventricular Assist Device Insertion on Pump Oxygenator;  Surgeon: Ronnie Quigley MD;  [facility]    IR CHEST TUBE PLACEMENT NON-TUNNELED RIGHT  7/11/2021    LAPAROSCOPY DIAGNOSTIC (GENERAL) N/A 1/4/2024    Procedure: Diagnostic Laparoscopy, Exploratory Laparotomy with Extensive lysis of adhesions.;  Surgeon: Frederick Montgomery MD;  [facility]    ORTHOPEDIC SURGERY  1994    right knee wired    PICC DOUBLE LUMEN PLACEMENT Right 09/23/2020    5FR PICC DL. Length-43cm (1cm out).    PICC INSERTION - DOUBLE LUMEN Right 05/09/2021    IK/BRACH    RELEASE CARPAL TUNNEL BILATERAL Bilateral 02/18/2021    Procedure: Bilateral carpal tunnel release;  Surgeon: Jermaine Brand MD;   [facility]    TRANSPLANT HEART RECIPIENT N/A 05/05/2021    Procedure: Redo median sternotomy, lysis of adhesions, heart transplant recipient, on cardiopulmonary bypass, intraoperative transesophageal echocardiogram per anesthesia, Implantable Cardioverter Defibrillator (ICD) removal;  Surgeon: Ronnie Quigley MD;  [facility]        MEDICATIONS:  PTA Meds  Prior to Admission medications    Medication Sig Last Dose Taking? Auth Provider Long Term End Date   acetaminophen (TYLENOL) 325 MG tablet Take 3 tablets (975 mg) by mouth every 8 hours as needed for mild pain 5/19/2024 at unknown Yes Frederick Montgomery MD No    albuterol (PROAIR HFA/PROVENTIL HFA/VENTOLIN HFA) 108 (90 Base) MCG/ACT inhaler Inhale 2 puffs into the lungs every 4 hours as needed for shortness of breath, wheezing or cough 5/20/2024 at am Yes Ricardo Gómez MD Yes    allopurinol (ZYLOPRIM) 300 MG tablet TAKE 1 TABLET BY MOUTH DAILY 5/20/2024 at am Yes Ricardo Gómez MD No    aspirin (ASA) 81 MG chewable tablet Take 1 tablet (81 mg) by mouth daily 5/20/2024 at am Yes Clara Valentin PA-C     buPROPion (WELLBUTRIN XL) 300 MG 24 hr tablet Take 1 tablet (300 mg) by mouth every morning 5/20/2024 at am Yes Ricardo Gómez MD Yes    cyanocobalamin (VITAMIN B-12) 1000 MCG tablet Take 1,000 mcg by mouth daily Unknown at unknown Yes Unknown, Entered By History     diphenhydrAMINE (BENADRYL) 25 MG tablet Take 25 mg by mouth every 6 hours as needed for itching Past Week at unknown Yes Reported, Patient No    gabapentin (NEURONTIN) 300 MG capsule Take 300 mg by mouth daily 5/20/2024 at am Yes Reported, Patient No    ipratropium - albuterol 0.5 mg/2.5 mg/3 mL (DUONEB) 0.5-2.5 (3) MG/3ML neb solution Take 1 vial by nebulization 4 times daily as needed for shortness of breath, wheezing or cough Unknown at unknown Yes Reported, Patient No    Melatonin 10 MG CAPS Take 1 capsule by mouth nightly as needed Past Week at unknown Yes Reported, Patient      montelukast (SINGULAIR) 10 MG tablet TAKE 1 TABLET BY MOUTH AT  BEDTIME 5/19/2024 at pm Yes Ricardo Gómez MD Yes    mycophenolate (GENERIC EQUIVALENT) 250 MG capsule TAKE one CAPSULE BY MOUTH TWICE A DAY 5/20/2024 at am Yes Delisa Montgomery MD Yes    polyethylene glycol (MIRALAX) 17 GM/Dose powder Take 1 Capful by mouth daily as needed for constipation Unknown at unknown Yes Reported, Patient     tacrolimus (GENERIC EQUIVALENT) 0.5 MG capsule Take 0.5 mg by mouth every morning With 3 mg capsules 5/20/2024 at am Yes Delisa Montgomery MD     tacrolimus (GENERIC EQUIVALENT) 1 MG capsule TAKE 3 CAPSULES BY MOUTh IN am and in pm. 5/20/2024 at am Yes Delisa Montgomery MD     traMADol (ULTRAM) 50 MG tablet Take 50 mg by mouth every 6 hours as needed for severe pain Unknown at unknown Yes Reported, Patient     VITAMIN D PO Take 1 capsule by mouth daily 5/20/2024 at am Yes Reported, Patient     rosuvastatin (CRESTOR) 10 MG tablet Take 1 tablet (10 mg) by mouth daily   Ricardo Gómez MD Yes       Current Meds  Current Facility-Administered Medications   Medication Dose Route Frequency Provider Last Rate Last Admin    allopurinol (ZYLOPRIM) tablet 300 mg  300 mg Oral Daily Emmanuel Hernandez MD   300 mg at 05/27/24 0823    aspirin (ASA) chewable tablet 81 mg  81 mg Oral Daily Emmanuel Hernandez MD   81 mg at 05/27/24 0822    buPROPion (WELLBUTRIN XL) 24 hr tablet 300 mg  300 mg Oral QAM Emmanuel Hernandez MD   300 mg at 05/27/24 0822    calcium carbonate-vitamin D (OSCAL) 500-5 MG-MCG per tablet 1 tablet  1 tablet Oral Daily Shelia Means NP   1 tablet at 05/27/24 0823    [Held by provider] iron sucrose (VENOFER) 200 mg in sodium chloride 0.9 % 120 mL intermittent infusion  200 mg Intravenous Q24H Didi Butler PA-C   Held at 05/27/24 1101    melatonin tablet 10 mg  10 mg Oral At Bedtime Mirian Oviedo MD   10 mg at 05/26/24 2214    montelukast (SINGULAIR) tablet 10 mg  10 mg Oral At Bedtime Svet,  Emmanuel LANIER MD   10 mg at 05/26/24 2214    mycophenolate (GENERIC EQUIVALENT) capsule 500 mg  500 mg Oral BID IS Didi Butler PA-C   500 mg at 05/27/24 0822    polyethylene glycol (MIRALAX) Packet 17 g  17 g Oral Daily Renata Cespedes MD   17 g at 05/27/24 0832    rosuvastatin (CRESTOR) tablet 10 mg  10 mg Oral Daily Emmanuel Hernandez MD   10 mg at 05/27/24 0823    tacrolimus (GENERIC EQUIVALENT) capsule 3 mg  3 mg Oral QPM Emmanuel Hernandez MD   3 mg at 05/26/24 1842    tacrolimus (GENERIC EQUIVALENT) capsule 3.5 mg  3.5 mg Oral QAM Emmanuel Hernandez MD   3.5 mg at 05/27/24 0822    warfarin ANTICOAGULANT (COUMADIN) tablet 7.5 mg  7.5 mg Oral ONCE at 18:00 Riaz Montaño MD        Warfarin Dose Required Daily - Pharmacist Managed  1 each Oral See Admin Instructions Didi Butler PA-C         Infusion Meds  Current Facility-Administered Medications   Medication Dose Route Frequency Provider Last Rate Last Admin    [Held by provider] heparin 25,000 units in 0.45% NaCl 250 mL ANTICOAGULANT infusion  0-5,000 Units/hr Intravenous Continuous Didi Butler PA-C   Stopped at 05/27/24 1052    Reason ACE/ARB/ARNI order not selected   Other DOES NOT GO TO Danay Perez MD        Reason beta blocker not prescribed   Does not apply DOES NOT GO TO Danay Perez MD           ALLERGIES:    Allergies   Allergen Reactions    Grass Shortness Of Breath    Ace Inhibitors Cough    Cats     Dust Mites Other (See Comments)     Asthma    Mold Other (See Comments)     Asthma    Penicillins Other (See Comments)     Unknown - childhood exposure    Tolerated Zosyn 9/18-9/20/2020    Per patient report he has tolerated amoxicillin    Sulfa Antibiotics Other (See Comments) and Unknown     Unknown childhood reaction       REVIEW OF SYSTEMS:  A comprehensive of systems was negative except as noted above.    SOCIAL HISTORY:   Social History     Socioeconomic History    Marital status:      Spouse name: Not  on file    Number of children: Not on file    Years of education: Not on file    Highest education level: Not on file   Occupational History    Not on file   Tobacco Use    Smoking status: Former     Current packs/day: 0.00     Types: Cigarettes     Quit date:      Years since quittin.4     Passive exposure: Never    Smokeless tobacco: Never   Vaping Use    Vaping status: Never Used   Substance and Sexual Activity    Alcohol use: No    Drug use: No    Sexual activity: Yes     Partners: Female   Other Topics Concern    Parent/sibling w/ CABG, MI or angioplasty before 65F 55M? No   Social History Narrative    Not on file     Social Determinants of Health     Financial Resource Strain: High Risk (2023)    Financial Resource Strain     Within the past 12 months, have you or your family members you live with been unable to get utilities (heat, electricity) when it was really needed?: Yes   Food Insecurity: Low Risk  (2023)    Food Insecurity     Within the past 12 months, did you worry that your food would run out before you got money to buy more?: No     Within the past 12 months, did the food you bought just not last and you didn t have money to get more?: No   Transportation Needs: Low Risk  (2023)    Transportation Needs     Within the past 12 months, has lack of transportation kept you from medical appointments, getting your medicines, non-medical meetings or appointments, work, or from getting things that you need?: No   Physical Activity: Not on file   Stress: Not on file   Social Connections: Not on file   Interpersonal Safety: Low Risk  (2023)    Interpersonal Safety     Do you feel physically and emotionally safe where you currently live?: Yes     Within the past 12 months, have you been hit, slapped, kicked or otherwise physically hurt by someone?: No     Within the past 12 months, have you been humiliated or emotionally abused in other ways by your partner or ex-partner?: No    Housing Stability: Low Risk  (2023)    Housing Stability     Do you have housing? : Yes     Are you worried about losing your housing?: No       FAMILY MEDICAL HISTORY:   Family History   Problem Relation Age of Onset    Cerebrovascular Disease Mother 64    Diabetes Mother     Hypertension Mother     Coronary Artery Disease Father     Diabetes Type 2  Father     Obesity Brother     Obesity Brother     Cerebrovascular Disease Daughter 40       PHYSICAL EXAM:   Temp  Av.2  F (36.8  C)  Min: 97.4  F (36.3  C)  Max: 98.8  F (37.1  C)      Pulse  Av.6  Min: 54  Max: 104 Resp  Av.8  Min: 16  Max: 20  SpO2  Av.1 %  Min: 97 %  Max: 100 %       /82 (BP Location: Right arm)   Pulse 93   Temp 97.7  F (36.5  C) (Oral)   Resp 18   Wt 92.1 kg (203 lb 1.6 oz)   SpO2 99%   BMI 30.88 kg/m       General Appearance: NAD, lying comfortably in bed  Respiratory: CTA bilaterally without crackles or wheezes   Cardiovascular: RRR without murmurs  GI: Soft, nontender, nondistended, normal active bowel sounds  Ext: No BLE   Neuro: A&Ox3  Skin: No rashes or lesions    Date 24 07 - 24 0659   Shift 1199-0530 5000-9927 7541-9119 24 Hour Total   INTAKE   P.O. 555   555   Shift Total(mL/kg) 555(6.2)   555(6.2)   OUTPUT   Urine 200   200   Shift Total(mL/kg) 200(2.23)   200(2.23)   Weight (kg) 89.5 89.5 89.5 89.5      Admit Weight: 91.4 kg (201 lb 9.6 oz)       LABS:   I have reviewed the following labs:  CMP  Recent Labs   Lab 24  0526 24  0556 24  1948 24  1716 24  1617 24  0435 24  1537 24  0447 24  1626 24  0511 24  0525 24  0535   * 132*  --   --  131* 134*   < > 136   < > 137   < > 137   POTASSIUM 5.1 4.7  --   --  5.0 4.5   < > 5.0   < > 4.3   < > 4.1   CHLORIDE 102 102  --   --  101 103   < > 103   < > 104   < > 103   CO2 19* 18*  --   --  19* 19*   < > 21*   < > 21*   < > 19*   ANIONGAP 12 12  --   --  11 12   <  > 12   < > 12   < > 15   GLC 87 94 123* 182* 111* 103*   < > 145*   < > 99   < > 85   BUN 50.3* 50.3*  --   --  53.2* 57.2*   < > 62.5*   < > 70.4*   < > 84.5*   CR 3.07* 2.74*  --   --  2.84* 2.85*   < > 2.92*   < > 2.86*   < > 2.92*   GFRESTIMATED 22* 25*  --   --  24* 24*   < > 23*   < > 24*   < > 23*   QAMAR 8.8 9.0  --   --  8.9 8.9   < > 9.1   < > 8.9   < > 8.4*   MAG 2.3 2.2  --   --   --  2.3  --  2.5*  --  2.1   < > 2.0   PHOS  --   --   --   --   --   --   --   --   --  4.6*  --   --    PROTTOTAL  --   --   --   --   --   --   --   --   --   --   --  6.1*   ALBUMIN  --   --   --   --   --   --   --   --   --   --   --  3.7   BILITOTAL  --   --   --   --   --   --   --   --   --   --   --  0.5   ALKPHOS  --   --   --   --   --   --   --   --   --   --   --  110   AST  --   --   --   --   --   --   --   --   --   --   --  34   ALT  --   --   --   --   --   --   --   --   --   --   --  38    < > = values in this interval not displayed.     CBC  Recent Labs   Lab 05/27/24  0526 05/26/24  0556 05/25/24  0435 05/24/24  1020   HGB 9.9* 9.9* 9.6* 11.0*   WBC 6.0 6.0 5.3 5.9   RBC 3.38* 3.42* 3.25* 3.69*   HCT 32.0* 32.3* 30.9* 34.9*   MCV 95 94 95 95   MCH 29.3 28.9 29.5 29.8   MCHC 30.9* 30.7* 31.1* 31.5   RDW 13.9 13.8 13.6 13.8    206 187 223     INR  Recent Labs   Lab 05/27/24  0526 05/26/24  0556 05/25/24  0435 05/24/24  0447 05/23/24  2057   INR 2.10* 1.60* 1.40* 1.26*  --    PTT  --   --   --   --  87*     ABGNo lab results found in last 7 days.   URINE STUDIES  Recent Labs   Lab Test 05/21/24  1306 05/20/24  1351 03/21/24  1007 01/06/24  1351   COLOR Light Yellow Straw Yellow Light Yellow   APPEARANCE Clear Clear Clear Clear   URINEGLC Negative Negative Negative Negative   URINEBILI Negative Negative Negative Negative   URINEKETONE Trace* Negative Negative Negative   SG 1.015 1.007 1.015 1.017   UBLD Negative Negative Trace* Moderate*   URINEPH 5.5 6.5 5.5 5.5   PROTEIN Negative Negative Trace* 50*    UROBILINOGEN  --   --  0.2  --    NITRITE Negative Negative Negative Negative   LEUKEST Negative Negative Trace* Negative   RBCU <1 0 0-2 1   WBCU <1 <1 5-10* 1     No lab results found.  PTH  Recent Labs   Lab Test 03/28/23  1129   PTHI 529*     IRON STUDIES  Recent Labs   Lab Test 05/23/24  0511 08/05/21  0931 05/13/21  0534 02/09/21  0518 01/21/21  1350 09/10/20  0830 09/19/19  1129 07/31/19  1050 07/03/19  0828 11/20/18  0934 10/19/18  1007 07/06/18  0856 05/24/17  0652   IRON 16* 30* 38 28* 36 29* 139 40 51 48 43 34* 63    240 215* 285 381 294 348 367 367 353 341 430 248   IRONSAT 5* 13* 18 10* 10* 10* 40 11* 14* 14* 13* 8* 25   VITA 24* 98 98 33  --   --   --  41 46 71  --  45 200       IMAGING:  US renal  IMPRESSION: No hydronephrosis. Simple left renal cyst.      William Aburto MD

## 2024-05-28 NOTE — PROGRESS NOTES
D: fluid excess, found PE during hospitalization     I: Monitored vitals and assessed pt status.   Changed: Mucinex ordered. Negative on respiratory panel  Tele: SR w/ 1AVB. No junctional rhythms   O2: RA  Mobility: SBA    Neuro: A0x 4, baseline neuropathy  Cardiac:NSR. No episodes of junctional this shift  Resp: Endorses mild SOB requested nebs. Patient endorses postnasal drip. Lung sounds clear  GI/:5/28 BM. Voids spontaneously  Diet:regular   Skin:n/a  LADs:2 PIV R     Patient on warfarin. Nephrology consulting for [facility]      P: Continue to monitor Pt status and report changes to CARDS 2.

## 2024-05-28 NOTE — PROGRESS NOTES
Electrophysiology Consult Service  Follow up Note   EP Attending: Dr. Vázquez   Date of Service: 5/28/2024     S: episode of junctional rhythm yesterday lasting several minutes, HR 57 bpm, no significant symptoms with this, he did note his HR was lower  HPI:   Jim Willingham is a 68 year old male admitted on 5/20/2024. He has a PMH most remarkable for NICM and is status post OHT on 5/6/2021. He was admitted for SOB on 5/20/2024 and was found to have a PE. EP was consulted 5/25 to due bradycardia and concern for intermittent junctional rhythm.   Patient presented to the hospital with intermittent shortness of breath and dizziness and was diagnosed with a non-provoked PE. Overnight 5/24 he had some bradycardia that was junctional. He did have evidence of AV conduction during these episodes (retrograde) demonstrating intact AV shara function. He has had several episodes of this, two the night prior where he was potentially symptomatic; and one on afternoon on 5/25 while EP was discussing with him, he was asymptomatic at this time.   EP re-involved today for consideration of PPM. Patient with several minutes of junctional rhythm yesterday afternoon around 1645. Prior to admission, he reports he was having 5-10 episodes/day of shortness of breath and dizziness, episodes short lasting 5-10 minutes. No associated syncope. He has continued to have some episodes of dizziness and shortness of breath during admission, but overall symptoms have improved. He reports sometimes his HR is low with symptoms and sometimes it is not. He reports several episodes yesterday where he felt symptoms, but his HR was ok.   O:   Vitals: BP (!) 134/90 (BP Location: Right arm)   Pulse 101   Temp 97.5  F (36.4  C) (Oral)   Resp 18   Wt 91.2 kg (201 lb 1.6 oz)   SpO2 98%   BMI 30.58 kg/m    Gen: AxO, NAD   Lungs: CTAB   CV: S1S2,Reg, no M/R/G noted.   Abd: Soft, nontender, nondistended  Ext: No pedal edema    Data:  Labs:  BMP  Recent  Labs   Lab 05/28/24  0607 05/27/24  0526 05/26/24  0556 05/25/24  1948 05/25/24  1716 05/25/24  1617   * 133* 132*  --   --  131*   POTASSIUM 4.9 5.1 4.7  --   --  5.0   CHLORIDE 103 102 102  --   --  101   QAMAR 8.8 8.8 9.0  --   --  8.9   CO2 19* 19* 18*  --   --  19*   BUN 45.9* 50.3* 50.3*  --   --  53.2*   CR 2.97* 3.07* 2.74*  --   --  2.84*   * 87 94 123*   < > 111*    < > = values in this interval not displayed.     CBC  Recent Labs   Lab 05/28/24  0607 05/27/24  0526 05/26/24  0556 05/25/24  0435   WBC 5.6 6.0 6.0 5.3   RBC 3.59* 3.38* 3.42* 3.25*   HGB 10.5* 9.9* 9.9* 9.6*   HCT 33.8* 32.0* 32.3* 30.9*   MCV 94 95 94 95   MCH 29.2 29.3 28.9 29.5   MCHC 31.1* 30.9* 30.7* 31.1*   RDW 14.0 13.9 13.8 13.6    188 206 187     INR  Recent Labs   Lab 05/28/24  0607 05/27/24 0526 05/26/24  0556 05/25/24  0435   INR 2.72* 2.10* 1.60* 1.40*       Meds per [facility] EMR:  Current Facility-Administered Medications   Medication Dose Route Frequency Provider Last Rate Last Admin    acetaminophen (TYLENOL) tablet 975 mg  975 mg Oral Q8H PRN Mirian Oviedo MD   975 mg at 05/28/24 0410    albuterol (PROVENTIL HFA/VENTOLIN HFA) inhaler  2 puff Inhalation Q4H PRN Emmanuel Hernandez MD   2 puff at 05/26/24 2048    allopurinol (ZYLOPRIM) tablet 300 mg  300 mg Oral Daily Emmanuel Hernandez MD   300 mg at 05/27/24 0823    aspirin (ASA) chewable tablet 81 mg  81 mg Oral Daily Emmanuel Hernandez MD   81 mg at 05/27/24 0822    bisacodyl (DULCOLAX) suppository 10 mg  10 mg Rectal Daily PRN Didi Butler PA-C        buPROPion (WELLBUTRIN XL) 24 hr tablet 300 mg  300 mg Oral QAM Emmanuel Hernandez MD   300 mg at 05/27/24 0822    calcium carbonate-vitamin D (OSCAL) 500-5 MG-MCG per tablet 1 tablet  1 tablet Oral Daily Shelia Means NP   1 tablet at 05/27/24 0823    diphenhydrAMINE (BENADRYL) capsule 25 mg  25 mg Oral Q6H PRN Shelia Means NP        Or    diphenhydrAMINE (BENADRYL) injection 25 mg  25 mg Intravenous Q6H  PRN Shelia Means NP   25 mg at 05/26/24 2026    EPINEPHrine (ADRENALIN) kit 0.3 mg  0.3 mg Intramuscular Q3 Min PRN Didi Butler PA-C        famotidine (PEPCID) injection 20 mg  20 mg Intravenous Once PRN Didi Butler PA-C        HOLD nitroGLYcerin IF   Does not apply HOLD Danay Lee MD        ipratropium - albuterol 0.5 mg/2.5 mg/3 mL (DUONEB) neb solution 3 mL  1 vial Nebulization Q4H PRN Emmanuel Hernandez MD   3 mL at 05/27/24 1850    melatonin tablet 10 mg  10 mg Oral At Bedtime Mirian Oviedo MD   10 mg at 05/27/24 2145    methylPREDNISolone sodium succinate (solu-MEDROL) injection 125 mg  125 mg Intravenous Once PRN Didi Butler PA-C        montelukast (SINGULAIR) tablet 10 mg  10 mg Oral At Bedtime Emmanuel Hernandez MD   10 mg at 05/27/24 2145    mycophenolate (GENERIC EQUIVALENT) capsule 500 mg  500 mg Oral BID IS Didi Butler PA-C   500 mg at 05/27/24 1825    naloxone (NARCAN) injection 0.2 mg  0.2 mg Intravenous Q2 Min PRN Riaz Montaño MD        Or    naloxone (NARCAN) injection 0.4 mg  0.4 mg Intravenous Q2 Min PRN Riaz Montaño MD        Or    naloxone (NARCAN) injection 0.2 mg  0.2 mg Intramuscular Q2 Min PRN Riaz Montaño MD        Or    naloxone (NARCAN) injection 0.4 mg  0.4 mg Intramuscular Q2 Min PRN Riaz Montaño MD        nitroGLYcerin (NITROSTAT) sublingual tablet 0.4 mg  0.4 mg Sublingual Q5 Min PRN Danay Lee MD        ondansetron (ZOFRAN ODT) ODT tab 4 mg  4 mg Oral Q6H PRN Renata Cespedes MD   4 mg at 05/26/24 0444    polyethylene glycol (MIRALAX) Packet 17 g  17 g Oral Daily Renata Cespedes MD   17 g at 05/27/24 0832    Reason ACE/ARB/ARNI order not selected   Other DOES NOT GO TO Danay Perez MD        Reason beta blocker not prescribed   Does not apply DOES NOT GO TO Danay Perez MD        rosuvastatin (CRESTOR) tablet 10 mg  10 mg Oral Daily Emmanuel Hernandez MD   10 mg at  05/27/24 0823    sennosides (SENOKOT) tablet 8.6 mg  8.6 mg Oral BID PRN Renata Cespedes MD        tacrolimus (GENERIC EQUIVALENT) capsule 3 mg  3 mg Oral QPM Emmanuel Hernandez MD   3 mg at 05/27/24 1825    tacrolimus (GENERIC EQUIVALENT) capsule 3.5 mg  3.5 mg Oral QAM Emmanuel Hernandez MD   3.5 mg at 05/27/24 0822    traMADol (ULTRAM) tablet 50 mg  50 mg Oral Q6H PRN Didi Butler PA-C   50 mg at 05/27/24 1833    Or    traMADol (ULTRAM) half-tab 25 mg  25 mg Oral Q6H PRN Didi Butler PA-C   25 mg at 05/24/24 2235    Warfarin Dose Required Daily - Pharmacist Managed  1 each Oral See Admin Instructions Didi Butler PA-C         RHC/LHC: 5/23/24    Right sided filling pressures are mildly elevated.    Left sided filling pressures are normal.    Mild elevated pulmonary hypertension.    Normal cardiac output level.    Successful endomyocardial biopsy. Results pending.    Echo: 5/20/2024   Interpretation Summary  Left ventricular size, wall motion and function are normal. The ejection  fraction is 55-60%.  Right ventricular function, chamber size, wall motion, and thickness are  normal.  Trace to mild tricuspid insufficiency is present.  IVC diameter >2.1 cm collapsing <50% with sniff suggests a high RA pressure  estimated at 15 mmHg or greater. No pericardial effusion is present.    Telemetry: 5/27/24 1645    A:   Jim Willingham is a 68 year old male admitted on 5/20/2024. He has a PMH most remarkable for NICM and is status post OHT on 5/6/2021. He was admitted for SOB on 5/20/2024 and was found to have a PE. EP was consulted 5/25 to due bradycardia and concern for intermittent junctional rhythm.   Patient presented to the hospital with intermittent shortness of breath and dizziness and was diagnosed with a non-provoked PE. Overnight 5/24 he had some bradycardia that was junctional. He did have evidence of AV conduction during these episodes (retrograde) demonstrating intact AV shara function. He has had  several episodes of this, two the night prior where he was potentially symptomatic; and one on afternoon on 5/25 while EP was discussing with him, he was asymptomatic at this time.   EP re-involved today for consideration of PPM. Patient with several minutes of junctional rhythm yesterday afternoon around 1645. Prior to admission, he reports he was having 5-10 episodes/day of shortness of breath and dizziness, episodes short lasting 5-10 minutes. No associated syncope. He has continued to have some episodes of dizziness and shortness of breath during admission, but overall symptoms have improved. He reports sometimes his HR is low with symptoms and sometimes it is not. He reports several episodes yesterday where he felt symptoms, but his HR was ok.   EP recommendations:   Asymptomatic Bradycardia While Sleeping  Junctional Rhythm with Retrograde P-waves  NICM status post OHT in 2021  - Patient with episodes of junctional rhythm lasting several minutes with HR 50-60 bpm, then self resolving. Patients symptoms (dizziness, shortness of breath) sometimes are correlated to lower HR/intermittent junctional, but more often then not, they are not clearly associated to periods of junctional rhythm    -  Recommend discharge on 14 day zio monitor for further evaluation of symptom/rhythm correlation and whether or not PPM is indicated, close follow up with EP after completion of monitor, EP will arrange follow up   The patient states understanding and is agreeable with plan.   Thank you much for allowing us to participate in the care of this pleasant patient.   This patient was discussed with Dr. Vázquez and the note above reflects our joint assessment and plan.     Liliane Douglas PA-C  [facility]  Electrophysiology Consult Service  Pager: [phone]    EP STAFF NOTE  I have seen and examined the patient as part of a shared visit with MARIYA Nicolas.  I agree with the note above. I reviewed today's vital  signs and medications. I have reviewed and discussed with the advanced practice provider their physical examination, assessment, and plan   Briefly, OHT, intermittent junctional rhythm, uncertain symptom rhythm correlation  My key history/exam findings are: RRR.   The key management decisions made by me: monitor upon discharge, follow-up as outpatient.  Total time spent on patient visit, reviewing notes, imaging, labs, orders, and completing necessary documentation: 60 minutes.  >50% of visit spent on counseling patient and/or coordination of care.     Cristóbal Vázquez MD [facility]  Cardiology - Electrophysiology

## 2024-05-28 NOTE — PLAN OF CARE
"D: Pt admitted for increased SOB and found to have a PE.     I: Monitored and assessed patient status; nursing cares provided.     A:   Today's Highlights/Changes:  Pt's INR 2.10, Heparin gtt discontinued, continues on coumadin  Lasix 40 mg IV x1 with adequate urine output  Iron infusion held today d/t questionable late-onset anaphylactic reaction the previous two evenings ~9 hours after receiving iron infusion when pt reported difficulty breathing and narrowing of throat that improved upon receiving benadryl. At ~1845, pt reported difficulty breathing and requested a neb treatment, although did mention he's unclear if it's more due to anxiety. He did report the difficulty breathing at this time was different than what he's been experiencing after iron infusions, denying throat narrowing and itchy eyes.    Neuro: A&Ox4, pleasant, reports increasing anxiety and that his \"mind is racing\". He contributes the increase in anxiety is due to the unknowns of the cause of his current condition.  Cardiac: SB/SR, HR 56-90's with intermittent junctional rhythms  Respiratory: Lungs clear, fine crackles in the bases; on RA. Duoneb x1 given.  GI/: +BS, BM x1; voiding via urinal--IV lasix given x1  Diet/Appetite: Regular, fair appetite  Skin: Intact  Pain: Headache 4/10 this evening, tylenol and tramadol given.  Labs/Lytes: K+ 5.1, Mg 2.3  LDA's: Right PIV x2, SL'd  Activity: Up independently to bedside commode; otherwise stand-by-assist depending on level of dizziness and level of weakness and fatigue.     P: Continue with current plan of care; contact Cards 2 for questions or concerns.     Xiomara Yuan, RN  Cardiology     "

## 2024-05-28 NOTE — PROGRESS NOTES
Nephrology progress note   05/28/2024      Jim Willingham MRN:[id number] YOB: 1955  Date of Admission:5/20/2024  Primary care provider: Ricardo Gómez  Requesting physician: Riaz Montaño MD    ASSESSMENT AND RECOMMENDATIONS:   Jim Willingham is a 69 yo male with a history of NICM (s/p HM2 LVAD 6/2017) s/p OHT 5/6/21, gastric bypass, CKD 3, DM type II, gout, and COPD who presented for RESENDIZ and was found to have a PE. Nephrology was consulted for WALTER.    # WALTER on CKD stage 3b  Etiology still unclear, though has had a couple days of very gradual improvement in Cr with holding lasix and prn IV fluids.   CKD was diagnosed years ago; pt does not recall being told the etiology. May be due to pt's DM, HTN, or past cardiorenal from heart failure. Baseline Cr ~1.4-1.8. Cr 3 on admission. Initial workup with bland UA, no hydronephrosis on renal US, most recent tacrolimus level 4.8. New DSA noted, but RHC today reveal RA 10, wedge 15. Reported baseline weight 192-195 lbs. Cr improved to 2.8 with diuretics but has not improved further thus far. Pt was hypotensive with SBP 80s on 5/22 but Cr did not change much after this episode. Suspect initially prerenal/cardiorenal vs EMILY (with tacro use), then possible over-diuresis with Lasix.   At this point we still think the main mechanism behind his WALTER is unclear but there might be a component of impaired renal autoregulation on a background of pertinent cardiac HX and being chronically on tac.   - Resume home diuretics  -Negative  LDH and haptoglobin (ordered to rule out local TMA in his kidney though unlikely as HB and PLT are stable)  -C/w low K diet   -low BK viral levels at 4k unlikely WALTER is related to BK nephropathy given lack of hematuria and low titers   -C3, C4, ANCA, dsDNA (ordered for you)  -plasma oxalate, 24h urine oxalate due to history of gastric bypass and possibility of oxalate nephropathy (ordered for you)  -Kidney biopsy at this time will not be  recommended due to anticoagulation status      # Status post OHT on 5/2021, history of NICM  # Chronic immunosuppression  DSAs:  NEW DSA noted this admission   -follow-up heart biopsy results     Normocytic anemia, chronic  Baseline hgb ~11. Hgb 10 at the time of consult without overt signs of bleeding. Pt took venofir in the past.   - Ordered iron studies showing iron 16 and iron sat 5%  -s/p venofer by primary team     BMD  Mild hyperphosphatemia likely iso WALTER.  - CTM    Recommendations were communicated to primary team via verbal/note    Seen and discussed with Dr. Cris Song MD    REASON FOR CONSULT: WALTER     Interval events   Cr 2.9   Still euvolemic   Off heparin drip   Lasix 40 mg yesterday   No orthostasis today   UO 2.3L Since MN      PAST MEDICAL HISTORY:  Reviewed with patient on 05/28/2024     Past Medical History:   Diagnosis Date    Bariatric surgery status 2003    Benign essential hypertension 05/11/2017    Bilateral carpal tunnel syndrome 11/02/2020    BPH with obstruction/lower urinary tract symptoms 03/21/2024    Cardiomyopathy, unspecified (H) 05/08/2017    CKD (chronic kidney disease) stage 3, GFR 30-59 ml/min (H) 05/11/2017    COPD (chronic obstructive pulmonary disease) (H) 11/02/2020    Depression 05/11/2017    Diabetes mellitus (H) 1995    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    Generalized osteoarthritis 09/26/2023    Gouty arthropathy, chronic, without tophi 11/02/2020    H/O adenomatous polyp of colon 08/03/2016    2 polyps    H/O gastric bypass 05/11/2017    History of type 2 diabetes mellitus 03/28/2023    Hyperlipidemia LDL goal <70 09/27/2022    ICD (implantable cardioverter-defibrillator), biventricular, in situ 05/11/2017    IFG (impaired fasting glucose) 09/26/2023    LVAD (left ventricular assist device) present (H)     Major depression, recurrent, chronic (H24) 11/02/2020    Mild anemia 03/18/2022    NICM (nonischemic cardiomyopathy) (H)/ EF 20% 05/11/2017     ECHO: LVEDd. 7.66 cm, Restrictive pattern , Severe mitral valve regurgitation    CECILIA (obstructive sleep apnea) 05/11/2017    Paroxysmal atrial fibrillation (H) 05/11/2017    Paroxysmal VT (H) 05/11/2017    Peripheral polyneuropathy 03/28/2023    Postsurgical dumping syndrome 03/21/2024    Pulmonary cavitary lesion 11/18/2021    Rotator cuff tear arthropathy of both shoulders 11/02/2020    Type 2 diabetes mellitus with diabetic polyneuropathy (H) 03/18/2022    Uncomplicated asthma     Vitamin B12 deficiency (non anemic) 05/11/2017       Past Surgical History:   Procedure Laterality Date    ANESTHESIA CARDIOVERSION N/A 05/11/2020    Procedure: ANESTHESIA, FOR CARDIOVERSION @1100;  Surgeon: GENERIC ANESTHESIA PROVIDER;  [facility]    BRONCHOSCOPY (RIGID OR FLEXIBLE), DIAGNOSTIC N/A 8/30/2021    Procedure: BRONCHOSCOPY, WITH BRONCHOALVEOLAR LAVAGE;  Surgeon: Perlman, David Morris, MD;  Location: [facility]    COLONOSCOPY N/A 4/18/2024    Procedure: COLONOSCOPY, WITH POLYPECTOMY;  Surgeon: Jermaine Root MD;  [facility]: [facility]    CV CORONARY ANGIOGRAM N/A 5/17/2022    Procedure: Coronary Angiogram;  Surgeon: Jeffrey Gibson MD;  Location:  [facility] CARDIAC CATH LAB    CV CORONARY ANGIOGRAM N/A 5/23/2023    Procedure: Coronary Angiogram;  Surgeon: Scout Robins MD;  Location:  HE[facility] CARDIAC CATH LAB    CV HEART BIOPSY N/A 5/13/2021    Procedure: Heart Biopsy;  Surgeon: Scout Robins MD;  Location:  HEART CARDIAC CATH LAB    CV HEART BIOPSY N/A 5/20/2021    Procedure: Heart Biopsy;  Surgeon: Jeffrey Gibson MD;  Location:  HE[facility] CARDIAC CATH LAB    CV HEART BIOPSY N/A 5/27/2021    Procedure: Heart Biopsy;  Surgeon: Jac Dover MD;  Location:  HEART CARDIAC CATH LAB    CV HEART BIOPSY N/A 6/7/2021    Procedure: CV HEART BIOPSY;  Surgeon: Jeffrey Gibson MD;  Location:  HEART CARDIAC CATH LAB    CV HEART BIOPSY N/A 6/21/2021    Procedure:  CV HEART BIOPSY;  Surgeon: Scout Robins MD;  Location:  HEART CARDIAC CATH LAB    CV HEART BIOPSY N/A 7/5/2021    Procedure: CV HEART BIOPSY;  Surgeon: Jeffrey Gibson MD;  [facility]    CV HEART BIOPSY N/A 7/16/2021    Procedure: Heart Biopsy;  Surgeon: Amadeo Art MD;  Location:  HE[facility] CARDIAC CATH LAB    CV HEART BIOPSY N/A 8/5/2021    Procedure: Heart Biopsy;  Surgeon: Jeffrey Gibson MD;  [facility]    CV HEART BIOPSY N/A 8/31/2021    Procedure: Heart Cath Heart Biopsy;  Surgeon: Jeffrey Gibson MD;  [facility]    CV HEART BIOPSY N/A 9/21/2021    Procedure: CV HEART BIOPSY;  Surgeon: Jeffrey Gibson MD;  Location:  [facility] CARDIAC CATH LAB    CV HEART BIOPSY N/A 10/5/2021    Procedure: CV HEART BIOPSY;  Surgeon: Jeffrey Gibson MD;  Location:  HE[facility] CARDIAC CATH LAB    CV HEART BIOPSY N/A 11/4/2021    Procedure: CV HEART BIOPSY;  Surgeon: Nicola Seth MD;  Location:  HEART CARDIAC CATH LAB    CV HEART BIOPSY N/A 5/17/2022    Procedure: Heart Biopsy;  Surgeon: Jeffrey Gibson MD;  Location:  HEART CARDIAC CATH LAB    CV HEART BIOPSY N/A 5/23/2023    Procedure: Heart Biopsy;  Surgeon: Scout Robins MD;  Location:  HEART CARDIAC CATH LAB    CV HEART BIOPSY N/A 5/23/2024    Procedure: Heart Biopsy;  Surgeon: Precious Bautista MD;  Location:  HEART CARDIAC CATH LAB    CV RIGHT HEART CATH MEASUREMENTS RECORDED N/A 07/24/2019    Procedure: CV RIGHT HEART CATH;  Surgeon: Renu Sears MD;  Location:  HEART CARDIAC CATH LAB    CV RIGHT HEART CATH MEASUREMENTS RECORDED N/A 08/05/2020    Procedure: CV RIGHT HEART CATH;  Surgeon: Nicola Seth MD;  Location:  HEART CARDIAC CATH LAB    CV RIGHT HEART CATH MEASUREMENTS RECORDED N/A 01/07/2021    Procedure: CV RIGHT HEART CATH;  Surgeon: Jac Dover MD;   [facility]    CV RIGHT HEART CATH MEASUREMENTS RECORDED N/A 02/23/2021    Procedure: Heart Cath Right Heart Cath;  Surgeon: Jeffrey Gibson MD;  [facility]    CV RIGHT HEART CATH MEASUREMENTS RECORDED N/A 03/23/2021    Procedure: Heart Cath Right Heart Cath. request for 3/23;  Surgeon: Jeffrey Gibson MD;  Location:  [facility] CARDIAC CATH LAB    CV RIGHT HEART CATH MEASUREMENTS RECORDED N/A 5/13/2021    Procedure: Right Heart Cath;  Surgeon: Scout Robins MD;  Location:  HEART CARDIAC CATH LAB    CV RIGHT HEART CATH MEASUREMENTS RECORDED N/A 5/20/2021    Procedure: Right Heart Cath;  Surgeon: Jeffrey Gibson MD;  Location:  HEART CARDIAC CATH LAB    CV RIGHT HEART CATH MEASUREMENTS RECORDED N/A 5/27/2021    Procedure: Right Heart Cath;  Surgeon: Jac Dover MD;  Location:  HEART CARDIAC CATH LAB    CV RIGHT HEART CATH MEASUREMENTS RECORDED N/A 6/7/2021    Procedure: CV RIGHT HEART CATH;  Surgeon: Jeffrey Gibson MD;  Location:  HE[facility] CARDIAC CATH LAB    CV RIGHT HEART CATH MEASUREMENTS RECORDED N/A 6/21/2021    Procedure: CV RIGHT HEART CATH;  Surgeon: Scout Robins MD;  Location:  HEART CARDIAC CATH LAB    CV RIGHT HEART CATH MEASUREMENTS RECORDED N/A 7/5/2021    Procedure: CV RIGHT HEART CATH;  Surgeon: Jeffrey Gibson MD;  Location:  HEART CARDIAC CATH LAB    CV RIGHT HEART CATH MEASUREMENTS RECORDED N/A 7/16/2021    Procedure: Right Heart Cath;  Surgeon: Amadeo Art MD;  Location:  HEART CARDIAC CATH LAB    CV RIGHT HEART CATH MEASUREMENTS RECORDED N/A 8/5/2021    Procedure: CV RIGHT HEART CATH;  Surgeon: Jeffrey Gibson MD;  Location:  HEART CARDIAC CATH LAB    CV RIGHT HEART CATH MEASUREMENTS RECORDED N/A 8/31/2021    Procedure: Heart Cath Right Heart Cath;  Surgeon: Jeffrey Gibson MD;  Location: [facility]  CARDIAC CATH LAB    CV RIGHT HEART CATH MEASUREMENTS RECORDED N/A 9/21/2021    Procedure: CV RIGHT HEART CATH;  Surgeon: Jeffrey Gibson MD;  Location:  HEART CARDIAC CATH LAB    CV RIGHT HEART CATH MEASUREMENTS RECORDED N/A 10/5/2021    Procedure: CV RIGHT HEART CATH;  Surgeon: Jeffrey Gibson MD;  Location:  HEART CARDIAC CATH LAB    CV RIGHT HEART CATH MEASUREMENTS RECORDED N/A 11/4/2021    Procedure: CV RIGHT HEART CATH;  Surgeon: Nicola Seth MD;  Location:  HEART CARDIAC CATH LAB    CV RIGHT HEART CATH MEASUREMENTS RECORDED N/A 5/17/2022    Procedure: Right Heart Catheterization;  Surgeon: Jeffrey Gibson MD;  Location:  HEART CARDIAC CATH LAB    CV RIGHT HEART CATH MEASUREMENTS RECORDED N/A 5/23/2023    Procedure: Right Heart Catheterization;  Surgeon: Scout Robins MD;  Location:  HEART CARDIAC CATH LAB    CV RIGHT HEART CATH MEASUREMENTS RECORDED N/A 5/23/2024    Procedure: Right Heart Cath- pre noon with rush path;  Surgeon: Precious Bautista MD;  Location:  HEART CARDIAC CATH LAB    GI SURGERY  2003    [facility]    INSERT VENTRICULAR ASSIST DEVICE LEFT (HEARTMATE II) N/A 06/19/2017    Procedure: INSERT VENTRICULAR ASSIST DEVICE LEFT (HEARTMATE II);  Median Sternotomy Heartmate II Left Ventricular Assist Device Insertion on Pump Oxygenator;  Surgeon: Ronnie Quigley MD;  [facility]    IR CHEST TUBE PLACEMENT NON-TUNNELED RIGHT  7/11/2021    LAPAROSCOPY DIAGNOSTIC (GENERAL) N/A 1/4/2024    Procedure: Diagnostic Laparoscopy, Exploratory Laparotomy with Extensive lysis of adhesions.;  Surgeon: Frederick Montgomery MD;  [facility]    ORTHOPEDIC SURGERY  1994    right knee wired    PICC DOUBLE LUMEN PLACEMENT Right 09/23/2020    5FR PICC DL. Length-43cm (1cm out).    PICC INSERTION - DOUBLE LUMEN Right 05/09/2021    IK/BRACH    RELEASE CARPAL TUNNEL BILATERAL Bilateral 02/18/2021    Procedure: Bilateral carpal tunnel release;  Surgeon:  Jermaine Brand MD;  [facility]    TRANSPLANT HEART RECIPIENT N/A 05/05/2021    Procedure: Redo median sternotomy, lysis of adhesions, heart transplant recipient, on cardiopulmonary bypass, intraoperative transesophageal echocardiogram per anesthesia, Implantable Cardioverter Defibrillator (ICD) removal;  Surgeon: Ronnie Quigley MD;  [facility]        MEDICATIONS:  PTA Meds  Prior to Admission medications    Medication Sig Last Dose Taking? Auth Provider Long Term End Date   acetaminophen (TYLENOL) 325 MG tablet Take 3 tablets (975 mg) by mouth every 8 hours as needed for mild pain 5/19/2024 at unknown Yes Frederick Montgomery MD No    albuterol (PROAIR HFA/PROVENTIL HFA/VENTOLIN HFA) 108 (90 Base) MCG/ACT inhaler Inhale 2 puffs into the lungs every 4 hours as needed for shortness of breath, wheezing or cough 5/20/2024 at am Yes Ricardo Gómez MD Yes    allopurinol (ZYLOPRIM) 300 MG tablet TAKE 1 TABLET BY MOUTH DAILY 5/20/2024 at am Yes Ricardo Gómez MD No    aspirin (ASA) 81 MG chewable tablet Take 1 tablet (81 mg) by mouth daily 5/20/2024 at am Yes Clara Valentin PA-C     buPROPion (WELLBUTRIN XL) 300 MG 24 hr tablet Take 1 tablet (300 mg) by mouth every morning 5/20/2024 at am Yes Ricardo Gómez MD Yes    cyanocobalamin (VITAMIN B-12) 1000 MCG tablet Take 1,000 mcg by mouth daily Unknown at unknown Yes Unknown, Entered By History     diphenhydrAMINE (BENADRYL) 25 MG tablet Take 25 mg by mouth every 6 hours as needed for itching Past Week at unknown Yes Reported, Patient No    gabapentin (NEURONTIN) 300 MG capsule Take 300 mg by mouth daily 5/20/2024 at am Yes Reported, Patient No    ipratropium - albuterol 0.5 mg/2.5 mg/3 mL (DUONEB) 0.5-2.5 (3) MG/3ML neb solution Take 1 vial by nebulization 4 times daily as needed for shortness of breath, wheezing or cough Unknown at unknown Yes Reported, Patient No    Melatonin 10 MG CAPS Take 1 capsule by mouth nightly as needed Past Week at unknown Yes  Reported, Patient     montelukast (SINGULAIR) 10 MG tablet TAKE 1 TABLET BY MOUTH AT  BEDTIME 5/19/2024 at pm Yes Ricardo Gómez MD Yes    mycophenolate (GENERIC EQUIVALENT) 250 MG capsule TAKE one CAPSULE BY MOUTH TWICE A DAY 5/20/2024 at am Yes Delisa Montgomery MD Yes    polyethylene glycol (MIRALAX) 17 GM/Dose powder Take 1 Capful by mouth daily as needed for constipation Unknown at unknown Yes Reported, Patient     tacrolimus (GENERIC EQUIVALENT) 0.5 MG capsule Take 0.5 mg by mouth every morning With 3 mg capsules 5/20/2024 at am Yes Delisa Montgomery MD     tacrolimus (GENERIC EQUIVALENT) 1 MG capsule TAKE 3 CAPSULES BY MOUTh IN am and in pm. 5/20/2024 at am Yes Delisa Montgomery MD     traMADol (ULTRAM) 50 MG tablet Take 50 mg by mouth every 6 hours as needed for severe pain Unknown at unknown Yes Reported, Patient     VITAMIN D PO Take 1 capsule by mouth daily 5/20/2024 at am Yes Reported, Patient     rosuvastatin (CRESTOR) 10 MG tablet Take 1 tablet (10 mg) by mouth daily   Ricardo Gómez MD Yes       Current Meds  Current Facility-Administered Medications   Medication Dose Route Frequency Provider Last Rate Last Admin    allopurinol (ZYLOPRIM) tablet 300 mg  300 mg Oral Daily Emmanuel Hernandez MD   300 mg at 05/27/24 0823    aspirin (ASA) chewable tablet 81 mg  81 mg Oral Daily Emmanuel Hernandez MD   81 mg at 05/27/24 0822    buPROPion (WELLBUTRIN XL) 24 hr tablet 300 mg  300 mg Oral QAM Emmanuel Hernandez MD   300 mg at 05/27/24 0822    calcium carbonate-vitamin D (OSCAL) 500-5 MG-MCG per tablet 1 tablet  1 tablet Oral Daily Shelia Means NP   1 tablet at 05/27/24 0823    melatonin tablet 10 mg  10 mg Oral At Bedtime Mirian Oviedo MD   10 mg at 05/27/24 2145    montelukast (SINGULAIR) tablet 10 mg  10 mg Oral At Bedtime Emmanuel Hernandez MD   10 mg at 05/27/24 2145    mycophenolate (GENERIC EQUIVALENT) capsule 500 mg  500 mg Oral BID IS Didi Butler PA-C   500 mg at 05/27/24 1827     polyethylene glycol (MIRALAX) Packet 17 g  17 g Oral Daily Renata Cespedes MD   17 g at 24 0832    rosuvastatin (CRESTOR) tablet 10 mg  10 mg Oral Daily Emmanuel Hernandez MD   10 mg at 24 0823    tacrolimus (GENERIC EQUIVALENT) capsule 3 mg  3 mg Oral QPM Emmanuel Hernandez MD   3 mg at 24 1825    tacrolimus (GENERIC EQUIVALENT) capsule 3.5 mg  3.5 mg Oral QAM Emmanuel Hernandez MD   3.5 mg at 24 0822    Warfarin Dose Required Daily - Pharmacist Managed  1 each Oral See Admin Instructions Didi Butler PA-C         Infusion Meds  Current Facility-Administered Medications   Medication Dose Route Frequency Provider Last Rate Last Admin    Reason ACE/ARB/ARNI order not selected   Other DOES NOT GO TO Danay Perez MD        Reason beta blocker not prescribed   Does not apply DOES NOT GO TO Danay Perez MD           ALLERGIES:    Allergies   Allergen Reactions    Grass Shortness Of Breath    Ace Inhibitors Cough    Cats     Dust Mites Other (See Comments)     Asthma    Mold Other (See Comments)     Asthma    Penicillins Other (See Comments)     Unknown - childhood exposure    Tolerated Zosyn -2020    Per patient report he has tolerated amoxicillin    Sulfa Antibiotics Other (See Comments) and Unknown     Unknown childhood reaction       REVIEW OF SYSTEMS:  A comprehensive of systems was negative except as noted above.    SOCIAL HISTORY:   Social History     Socioeconomic History    Marital status:      Spouse name: Not on file    Number of children: Not on file    Years of education: Not on file    Highest education level: Not on file   Occupational History    Not on file   Tobacco Use    Smoking status: Former     Current packs/day: 0.00     Types: Cigarettes     Quit date:      Years since quittin.4     Passive exposure: Never    Smokeless tobacco: Never   Vaping Use    Vaping status: Never Used   Substance and Sexual Activity    Alcohol use: No     Drug use: No    Sexual activity: Yes     Partners: Female   Other Topics Concern    Parent/sibling w/ CABG, MI or angioplasty before 65F 55M? No   Social History Narrative    Not on file     Social Determinants of Health     Financial Resource Strain: High Risk (9/26/2023)    Financial Resource Strain     Within the past 12 months, have you or your family members you live with been unable to get utilities (heat, electricity) when it was really needed?: Yes   Food Insecurity: Low Risk  (9/26/2023)    Food Insecurity     Within the past 12 months, did you worry that your food would run out before you got money to buy more?: No     Within the past 12 months, did the food you bought just not last and you didn t have money to get more?: No   Transportation Needs: Low Risk  (9/26/2023)    Transportation Needs     Within the past 12 months, has lack of transportation kept you from medical appointments, getting your medicines, non-medical meetings or appointments, work, or from getting things that you need?: No   Physical Activity: Not on file   Stress: Not on file   Social Connections: Not on file   Interpersonal Safety: Low Risk  (9/26/2023)    Interpersonal Safety     Do you feel physically and emotionally safe where you currently live?: Yes     Within the past 12 months, have you been hit, slapped, kicked or otherwise physically hurt by someone?: No     Within the past 12 months, have you been humiliated or emotionally abused in other ways by your partner or ex-partner?: No   Housing Stability: Low Risk  (9/26/2023)    Housing Stability     Do you have housing? : Yes     Are you worried about losing your housing?: No       FAMILY MEDICAL HISTORY:   Family History   Problem Relation Age of Onset    Cerebrovascular Disease Mother 64    Diabetes Mother     Hypertension Mother     Coronary Artery Disease Father     Diabetes Type 2  Father     Obesity Brother     Obesity Brother     Cerebrovascular Disease Daughter 40        PHYSICAL EXAM:   Temp  Av.2  F (36.8  C)  Min: 97.4  F (36.3  C)  Max: 98.8  F (37.1  C)      Pulse  Av.6  Min: 54  Max: 104 Resp  Av.8  Min: 16  Max: 20  SpO2  Av.1 %  Min: 97 %  Max: 100 %       BP (!) 134/90 (BP Location: Right arm)   Pulse 101   Temp 97.5  F (36.4  C) (Oral)   Resp 18   Wt 91.2 kg (201 lb 1.6 oz)   SpO2 98%   BMI 30.58 kg/m       General Appearance: NAD, lying comfortably in bed  Respiratory: CTA bilaterally without crackles or wheezes   Cardiovascular: RRR without murmurs  GI: Soft, nontender, nondistended, normal active bowel sounds  Ext: No BLE   Neuro: A&Ox3  Skin: No rashes or lesions    Date 24 0700 - 24 0659   Shift 0249-7599 9211-2287 7345-1050 24 Hour Total   INTAKE   P.O. 555   555   Shift Total(mL/kg) 555(6.2)   555(6.2)   OUTPUT   Urine 200   200   Shift Total(mL/kg) 200(2.23)   200(2.23)   Weight (kg) 89.5 89.5 89.5 89.5      Admit Weight: 91.4 kg (201 lb 9.6 oz)       LABS:   I have reviewed the following labs:  CMP  Recent Labs   Lab 24  0607 24  0526 24  0556 24  1948 24  1716 24  1617 24  0435 24  1626 24  0511   * 133* 132*  --   --  131* 134*   < > 137   POTASSIUM 4.9 5.1 4.7  --   --  5.0 4.5   < > 4.3   CHLORIDE 103 102 102  --   --  101 103   < > 104   CO2 19* 19* 18*  --   --  19* 19*   < > 21*   ANIONGAP 12 12 12  --   --  11 12   < > 12   * 87 94 123*   < > 111* 103*   < > 99   BUN 45.9* 50.3* 50.3*  --   --  53.2* 57.2*   < > 70.4*   CR 2.97* 3.07* 2.74*  --   --  2.84* 2.85*   < > 2.86*   GFRESTIMATED 22* 22* 25*  --   --  24* 24*   < > 24*   QAMAR 8.8 8.8 9.0  --   --  8.9 8.9   < > 8.9   MAG 2.3 2.3 2.2  --   --   --  2.3   < > 2.1   PHOS  --   --   --   --   --   --   --   --  4.6*    < > = values in this interval not displayed.     CBC  Recent Labs   Lab 24  0607 24  0526 24  0556 24  0435   HGB 10.5* 9.9* 9.9* 9.6*   WBC 5.6 6.0 6.0  5.3   RBC 3.59* 3.38* 3.42* 3.25*   HCT 33.8* 32.0* 32.3* 30.9*   MCV 94 95 94 95   MCH 29.2 29.3 28.9 29.5   MCHC 31.1* 30.9* 30.7* 31.1*   RDW 14.0 13.9 13.8 13.6    188 206 187     INR  Recent Labs   Lab 05/28/24  0607 05/27/24  0526 05/26/24  0556 05/25/24  0435 05/24/24  0447 05/23/24  2057   INR 2.72* 2.10* 1.60* 1.40*   < >  --    PTT  --   --   --   --   --  87*    < > = values in this interval not displayed.     ABGNo lab results found in last 7 days.   URINE STUDIES  Recent Labs   Lab Test 05/21/24  1306 05/20/24  1351 03/21/24  1007 01/06/24  1351   COLOR Light Yellow Straw Yellow Light Yellow   APPEARANCE Clear Clear Clear Clear   URINEGLC Negative Negative Negative Negative   URINEBILI Negative Negative Negative Negative   URINEKETONE Trace* Negative Negative Negative   SG 1.015 1.007 1.015 1.017   UBLD Negative Negative Trace* Moderate*   URINEPH 5.5 6.5 5.5 5.5   PROTEIN Negative Negative Trace* 50*   UROBILINOGEN  --   --  0.2  --    NITRITE Negative Negative Negative Negative   LEUKEST Negative Negative Trace* Negative   RBCU <1 0 0-2 1   WBCU <1 <1 5-10* 1     No lab results found.  PTH  Recent Labs   Lab Test 03/28/23  1129   PTHI 529*     IRON STUDIES  Recent Labs   Lab Test 05/23/24  0511 08/05/21  0931 05/13/21  0534 02/09/21  0518 01/21/21  1350 09/10/20  0830 09/19/19  1129 07/31/19  1050 07/03/19  0828 11/20/18  0934 10/19/18  1007 07/06/18  0856 05/24/17  0652   IRON 16* 30* 38 28* 36 29* 139 40 51 48 43 34* 63    240 215* 285 381 294 348 367 367 353 341 430 248   IRONSAT 5* 13* 18 10* 10* 10* 40 11* 14* 14* 13* 8* 25   VITA 24* 98 98 33  --   --   --  41 46 71  --  45 200       IMAGING:  US renal  IMPRESSION: No hydronephrosis. Simple left renal cyst.      Connie Song MD

## 2024-05-28 NOTE — PLAN OF CARE
5/27/2024 @ 0332-9390    Activity: Up ad francisco to commode, SBA outside room.   Cardiac: SR/SB w/ intermittent junctional rhythms, HR 50-90s. Denies chest pain.   Respiratory: RA, sats >90s. RESENDIZ.   GI/: LBM 5/26/2024. Regular diet. Urinal @ bedside/up to commode, adequate output.   Pain: C/O headache, acetaminophen x 1.    LDA's: R PIV x 2, SL.     All other body systems WDL.     Plan: Continue with POC; need iron transfusion re-evaluation with day team. Notify care team of any changes.     For vital signs and complete assessments, please see documentation flowsheets.      Suzanne Tillman, RN   Cardiology

## 2024-05-28 NOTE — PROGRESS NOTES
[facility]   Cardiology II Service / Advanced Heart Failure  Daily Progress Note      Patient: Jim Willingham  MRN: [id number]  Admission Date: 5/20/2024  Hospital Day # 8    Assessment and Plan:   Mr. Jim Willingham is a 68yr old male with a history of NICM (s/p HM2 LVAD 6/2017) s/p OHT 5/6/21, VT, AFib, CKD, DMII, and COPD who presented to the ED for increased SOB, where he was found to have a PE.    PLAN:  - follow-up AL amyloid labs  - Bumex 2 mg daily or BID today  - potential discharge tomorrow    s/p OHT 5/6/21   Immunosuppressed due to medications  Post-transplant course was c/b right pleural air leak (required prolonged chest tube), pAFib, CAP (RLL, 6/2021), recurrent pleural effusions (s/p thoracenteses x2 6/2021 and 7/2021, exudative, repeatedly cultured negative), RLL cavitary lesions (per chest CT 7/14/21, BD glucan indeterminate, aspergillus negative, not started on antifungals), pericardial effusion (1.4cm per TTE 7/12/21, conservatively managed), low-grade EBV viremia, recurrent/persistent gout flare, transaminase elevation with questionable choledocholithiasis (conservatively managed with resolution), COVID-19 (8/2021, s/p monoclonal antibodies and remdesivir x5 days), and 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    Rejection history:  none, last bx 5/23/24 was negative for ACR, AMR, capillary staining  AlloMap scores:  < 35 (last 5/2023)  DSAs:  new DSAs noted 5/20/24 (DQB9, low-level)  Coronary angio/Ischemic eval:  Coronary angio 5/2023 showed minimal CAD (20% stenosis in RCA, stable from prior), and was negative for obstruction.  RHC 5/2024  RA 10, mPA 25, PCW 15, and CI 2.48.  TTE 5/27/2024  LVEF 55-60% and normal RV size/function, IVC dilated     Serostatus:  - CMV D+/R+  - EBV D+/R+  - Toxo D-/R-     Immunosuppression:  - MMF increased to 500mg twice daily on 5/23/24 due to new DSAs (PTA dose was 250mg BID, lowered dose due to multiple  pneumonias)  - tacrolimus goal level 4-6, trough pending     PPx:  - CAV: aspirin 81mg daily and rosuvastatin 10mg daily.  - GI: pantoprazole 40mg daily  - Osteoporosis: Calcium/vitamin D     Graft function:  - BPs: Controlled, not on antihypertensives  - Fluid status: hypervolemic, improved, 2 mg daily to BID    Episodic junctional rhythm  Bradycardia  Has demonstrated intermittent runs of junctional rhythms and bradycardia, with HRs in the 50-60s.  He is feeling some SOB, but otherwise remains stable.  BPs are stable.  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    Pulmonary embolism, low risk  Elevated D-Dimer   Started to dyspnea and fatigue ~10 days prior to admission, noting that he acutely could ambulate across the room without dyspnea.  In the ED, D-dimer was elevated, NTproBNP elevated at 18,909 (in the setting of WALTER), creat 3.  V/Q scan showed findings of wedge shaped defects in basilar segments of lower right lobe, concerning for pulmonary embolism as per PISAPED criteria.  TTE was negative for RV Dysfunction.  Denies prior hx of PE, DVT, and no imaging findings suggestive of DVT.   - warfarin started 5/23,  -need minimum of 3 months of anticoagulation, goal INR 2-3 (DOAC cost prohibitive)  - Recommend outpatient cancer screening given unprovoked PE  - Per Dr. Montaño, no need to consider more invasive intervention on the PE    Headache  Has been having intermittent headaches x10 days. No neuro features. Brain MRI 5/24 was negative for acute pathology.  - continue prn tylenol    WALTER on CKD, improving  Baseline Cr ~1.4.  On admission here, was  3s.  Initially felt to be cardiorenal d/t reported weight gain, but he did not demonstrate any significant improvement with IV lasix.  No protein in urine to suggest glomerular process.  FeURea suggests pre-renal cause.  Renal ultrasound was negative for hydronephrosis and obstruction.  Spun urine was bland per renal.  Felt to be less likely embolic d/t no wedge infarcts on CT imaging.  LDH WNL on 5/24, haptoglobin WNL on 5/25. RHC showed euvolemia as above. He has had some orthostasis, so received IVF on 5/24 and 5/25, and have encouraged po intake since.  - Appreciate renal consult  - gentle diuresis  - continue tacro as above  - renally dose meds, avoid nephrotoxic meds  - no current indication for renal biopsy     Normocytic Anemia  Baseline Hgb ~11, remains stable. Suspect anemia of renal disease  - daily CBC  - IV iron course ordered - stopped due to iron reaction     Elevated Troponin  116 --> 112, suspect troponin leak in setting of PE and kidney injury.  However, given RV not showing any dysfunction hard to say if it is proportion or not to his PE severity.  Biopsy was negative for rejection.    Constipation  - Miralax daily  - Ducolax suppository prn      Medication induced antiplatelet effect - on pta aspirin  Immunocompromised Host - on tacrolimus and mmf  Prediabetes - A1c 5.8%    Diet: Regular adult diet, no fluid restriction   DVT Prophylaxis: Heparin SQ  Russell Catheter: Not present  Cardiac Monitoring: ACTIVE   Code Status: Full Code      Medically Ready for Discharge: Anticipated in 2-4 Days    Kiesha Wilder DNP, NP-C  Nurse Practitioner - Advanced Heart Failure/Cardiology II Service  [place] preferred or Pager [phone]      ================================================================    Subjective/24-Hr Events:   No overnight events. Feeling much better today. Weight down. Worked well with therapies.     Last 24 hr care team notes reviewed.  ROS:  4 point ROS including respiratory, CV, GI and   (other than that noted in the HPI) is negative.     Medications: Reviewed in EPIC.     Physical Exam:   BP (!) 134/90 (BP Location: Right arm)   Pulse 101   Temp 97.5  F (36.4  C) (Oral)   Resp 18   Wt 91.2 kg (201 lb 1.6 oz)   SpO2 98%   BMI 30.58 kg/m      GENERAL: Appears comfortable, in no distress, lying in bed.  HEENT: Eye symmetrical, no discharge or icterus bilaterally.   NECK: Supple, JVD negative when sitting upright   CV: RRR, +S1S2, no murmur, rub, or gallop.   RESPIRATORY: Respirations regular, even, and unlabored. Lungs CTA throughout.    GI: Soft and non distended   EXTREMITIES: No LE edema. All extremities are warm and well perfused  NEUROLOGIC: Alert and interacting appropriately. No focal deficits.   SKIN: No open lesions, rashes, or jaundice.    Labs:  CMP  Recent Labs   Lab 05/28/24  0607 05/27/24  0526 05/26/24  0556 05/25/24  1948 05/25/24  1716 05/25/24  1617 05/25/24  0435 05/23/24  1626 05/23/24  0511   * 133* 132*  --   --  131* 134*   < > 137   POTASSIUM 4.9 5.1 4.7  --   --  5.0 4.5   < > 4.3   CHLORIDE 103 102 102  --   --  101 103   < > 104   CO2 19* 19* 18*  --   --  19* 19*   < > 21*   ANIONGAP 12 12 12  --   --  11 12   < > 12   * 87 94 123*   < > 111* 103*   < > 99   BUN 45.9* 50.3* 50.3*  --   --  53.2* 57.2*   < > 70.4*   CR 2.97* 3.07* 2.74*  --   --  2.84* 2.85*   < > 2.86*   GFRESTIMATED 22* 22* 25*  --   --  24* 24*   < > 24*   QAMAR 8.8 8.8 9.0  --   --  8.9 8.9   < > 8.9   MAG 2.3 2.3 2.2  --   --   --  2.3   < > 2.1   PHOS  --   --   --   --   --   --   --   --  4.6*    < > = values in this interval not displayed.       CBC  Recent Labs   Lab 05/28/24  0607 05/27/24  0526 05/26/24  0556 05/25/24  0435   WBC 5.6 6.0 6.0 5.3   RBC 3.59* 3.38* 3.42* 3.25*   HGB 10.5* 9.9* 9.9* 9.6*   HCT 33.8* 32.0* 32.3* 30.9*   MCV 94 95 94 95   MCH 29.2 29.3 28.9 29.5   MCHC 31.1* 30.9* 30.7* 31.1*   RDW 14.0 13.9 13.8 13.6    188 206 187       INR  Recent Labs    Lab 05/28/24  0607 05/27/24  0526 05/26/24  0556 05/25/24  0435   INR 2.72* 2.10* 1.60* 1.40*       The above was reviewed with Dr. Montgomery.

## 2024-05-28 NOTE — PROGRESS NOTES
"D: fluid excess, found PE during hospitalization     I: Monitored vitals and assessed pt status.   Changed: n/a  Running: n/a  PRN: atarax X 1  Tele: SR w/ 1AVB, intermittent junctional rhythms  O2: RA  Mobility: SBA/ up ad francisco    Neuro: A0x 4  Cardiac::intermittent junctional rhythms   Resp:RESENDIZ, new cough  GI/: LBM 5/26, voids spontaneously   Diet:regular, poor appetite  Skin:n/a  LADs:2 PIV R arm     Discharge: Writer: \"The providers estimate that you will be ready for discharge on unknown date. What is your expectation for discharge?\"  Patient responded: Pt is unsure of discharge as team is still deciding on Tx    Need to knows:  -Iron infusion held currently, re-evaluate with day team since there was possible reaction  -echo tomorrow  -pt. States he feels like he is having Sx of a cold  -ziopatch upon DC    P: Continue to monitor Pt status and report changes to CARDS 2.  "

## 2024-05-29 PROBLEM — I26.93 SINGLE SUBSEGMENTAL PULMONARY EMBOLISM WITHOUT ACUTE COR PULMONALE (H): Status: ACTIVE | Noted: 2024-01-01

## 2024-05-29 NOTE — PROGRESS NOTES
Care Management Discharge Note    Discharge Date: 05/29/2024     Discharge Disposition: Home    Discharge Services: Outpatient Anticoagulation    Discharge DME: None    Discharge Transportation: family or friend will provide    Education Provided on the Discharge Plan: Yes  Persons Notified of Discharge Plans: Pt, NP  Patient/Family in Agreement with the Plan: Yes    Handoff Referral Completed: Yes    Additional Information:  Per NP, pt discharging home today. Pt started on warfarin this admission for PE. Pt was on warfarin for years prior to transplant when he had his LVAD so he is very familiar with monitoring, declines education.    NP placed outpatient anticoagulation clinic referral for [facility].    Pt scheduled for initial outpatient INR lab draw:  [facility]  INR lab draw on Friday 5/31 at 10am    CC will continue to monitor patient's medical condition and progress towards discharge.  Rin Ceballos RN BSN  6C Unit Care Coordinator  Phone number: [phone]  Pager: [phone]

## 2024-05-29 NOTE — PLAN OF CARE
Mr. Jim Willingham is a 68yr old male with a history of NICM (s/p HM2 LVAD 6/2017) s/p OHT 5/6/21, VT, AFib, CKD, DMII, and COPD who presented to the ED for increased SOB, where he was found to have a PE.   Shift 15:00-23:30:  Neuro: A&O x 4. Pleasant  Resp: Lungs CTA, Sats 100% on RA.  CV: VSS, SR/ST 90s-100s.  GI/: Eating and voiding well.  Mobility: Up independently in the room.  Pain: Denies.  Plan: Assist as needed. Cards 2 with issues.

## 2024-05-29 NOTE — DISCHARGE SUMMARY
"  [facility]   Cardiology II Service / Advanced Heart Failure  Discharge Summary     Jim Willingham MRN# [id number]   YOB: 1955 Age: 68 year old     DATE OF ADMISSION:  5/20/2024  DATE OF DISCHARGE: 5/29/2024  ADMITTING PROVIDER: Riaz Montaño MD  DISCHARGE PROVIDER: Delisa Montgomery MD and Kiesha Wilder DNP, ANP-C   PRIMARY PROVIDER:  Ricardo Gómez    ADMIT DIAGNOSES:   Hypervolemia, due to \"other\"   S/p orthotopic heart transplant, immunosuppression  WALTER on CKD  Elevated troponin  Elevated D dimer    DISCHARGE DIAGNOSES:   Acute PE  Episodic junctional rhythm  S/p orthotopic heart transplant, immunosuppression  Hypervolemia, mild 2/2 \"Other - worsening renal function\", acute diastolic heart failure ruled out  CKD  HA  Iron deficiency anemia  Elevated troponin 2/2 PE    FOLLOW-UP:  [] INR and BMP on Friday  [] if Cr up more would change Bumex to prn  [] follow-up in transplant clinic in ~3 weeks  [] follow-up with EP after ziopatch  [] patient to reschedule PCP appt for hospital follow-up  [] hematology follow-up in 3 months to discuss A/C plan    PENDING RESULTS:   [] oxalate studies  [] ANCA   [] SPEP, UPEP  [] total light chainrs    HPI: Please see the detailed H & P by Jadon Hernandez and Danyel from 5/20/2024. Briefly, Jim Willingham is a 67yo man with a history of NICM s/p HM2 LVAD in 6/2017 now s/p OHT 5/2021 admitted on 5/20 for progressive dyspnea and volume overload.      \"Started to notice symptoms ~10 days prior to admission. Primary symptom of concern is dyspnea and fatigue. Ordinarily has no exertional limits, now cannot ambulate across the room without dyspnea. Has also noticed concurrent ~15 pound weight gain (baseline weight 187) in addition to abdominal bloating and intermittent pleuritic chest pain. Also having some generalized headaches and vertigo during this same time period. No orthopnea. Ultimately it was the progressive dyspnea that lead him to present to ER today. " "Has been adherent to his medications without issues and no misses.      In regards to his cardiac history, he follows with Dr. Montgomery. 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      Since this time however he reports doing well clinically until more recently as described above.\"    HOSPITAL COURSE:   Pulmonary embolism, low risk  Elevated D-Dimer   Presented symptoms dyspnea and fatigue ~10 days prior to admission. ED, D-dimer was elevated, NTproBNP elevated at 18,909 (in the setting of WALTER), creat 3.  V/Q scan showed findings of wedge shaped defects in basilar segments of lower right lobe, concerning for pulmonary embolism as per PISAPED criteria.  TTE negative for RV dysfunction/strain. No prior hx of PE, DVT, and no imaging findings suggestive of DVT.   - warfarin started 5/23, INR 3.3 on day of discharge   - d/c'ed with 2.5 mg daily and INR check Friday  -need minimum of 3 months of anticoagulation, goal INR 2-3 (DOAC cost prohibitive)  - outpatient heme referral given unprovoked PE in ~ 3 mos    Episodic junctional rhythm  Bradycardia  Intermittent runs of junctional rhythms and bradycardia, HRs in the 50-60s. Does have some mild symptoms with this. BPs stable. Improved in the day prior to discharge. Biopsy negative.   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4cm per TTE 7/12/21, conservatively managed), low-grade EBV viremia, recurrent/persistent gout flare, transaminase elevation with questionable choledocholithiasis (conservatively managed with resolution), COVID-19 (8/2021, s/p monoclonal antibodies and remdesivir x5 days), and 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     Rejection history: none, bx 5/23/24 was negative for ACR, AMR, capillary staining  AlloMap scores: < 35 (last 5/2023)  DSAs: new DSAs noted 5/20/24 (DQB9, low-level)  Coronary angio/Ischemic eval:  Coronary angio 5/2023 showed minimal CAD (20% stenosis in RCA, stable from prior), and was negative for obstruction.  RHC 5/27/2024: RA 10, mPA 25, PCW 15, CI 2.48. weight 197 lb, EDW ~ 136 lb  TTE 5/27/2024: LVEF 55-60% and normal RV size/function, IVC dilated     Serostatus:  - CMV D+/R+  - EBV D+/R+  - Toxo D-/R-     Immunosuppression:  - MMF increased to 500mg bid due to new DSAs (PTA dose 250mg BID, lower due to multiple pneumonias)  - tacrolimus goal level 4-6, last trough 5.3     PPx:  - CAV: stop aspirin 81mg daily while on A/C, continue pta rosuvastatin 10mg daily  - GI: pantoprazole 40mg daily  - Osteoporosis: Calcium/vitamin D  "started     Graft function:  - BPs: Controlled, not on antihypertensives  - Fluid status: grossly euvolemic, discharge on Bumex 1 mg daily, BMP Friday, if Cr up more would change Bumex to prn     Orthostatic hypotension  Received IVF 5/24 and 5/25. Likely 2/2 PE. Symptoms resolved in days prior to discharge.      Headache  Intermittent headaches x10 days. No neuro features. Brain MRI 5/24 was negative for acute pathology. Symptoms improved prior to d.c.     WALTER on CKD  Hypervolemia, mild 2/2 \"Other - worsening renal function\", acute diastolic heart failure ruled out  Baseline Cr ~1.4.  On admission, 3s.  Initially felt to be cardiorenal d/t reported weight gain, but no significant improvement with diuresis No protein in urine to suggest glomerular process.  FeURea suggests pre-renal cause.  Renal ultrasound negative for hydronephrosis and obstruction.  Spun urine was bland per renal.  Felt to be less likely embolic d/t no wedge infarcts on CT imaging.  LDH WNL on 5/24, haptoglobin WNL on 5/25. RHC showed euvolemia as above. He has had some orthostasis, so received IVF on 5/24 and 5/25. Renal consulted, oxalate studies ordered and pending. [place]ro level therapeutic. Per renal no indication for bx. Follow-up with renal as outpatient. BMP Friday as above.     Iron deficiency anemia  Baseline Hgb ~11, stable. Suspect anemia of renal disease. V iron course ordered - stopped due to iron reaction.      Elevated Troponin  116 --> 112, suspect troponin leak in setting of PE and kidney injury. Biopsy negative for rejection, low c/f ACS. .     Chronic:   Constipation: Miralax daily, Ducolax suppository prn  Prediabetes - A1c 5.8%    Kiesha Wilder DNP, ANP-C  Advanced Heart Failure/Cardiology 2 Nurse Practitioner  5/29/2024  Vocera preferred, eun Pager: [phone]    PHYSICAL EXAM:  Blood pressure 117/85, pulse 85, temperature 97.5  F (36.4  C), temperature source Oral, resp. rate 18, height 1.727 m (5' 8\"), weight 88.6 kg (195 lb " 4.8 oz), SpO2 100%.    GENERAL: Appears comfortable, in no distress .  HEENT: Eye symmetrical and without discharge or icterus bilaterally. Mucous membranes moist and without lesions.  NECK: Supple, JVD just above clavicle.   CV: RRR, +S1S2, no murmur, rub, or gallop.   RESPIRATORY: Respirations regular, even, and unlabored. Lungs CTA throughout.   GI: Soft, obese and non distended with normoactive bowel sounds present in all quadrants. No tenderness, rebound, guarding.   EXTREMITIES: No peripheral edema. 2+ bilateral pedal pulses.   NEUROLOGIC: Alert and orientated x 3. No focal deficits.   MUSCULOSKELETAL: No joint swelling or tenderness.   SKIN: No jaundice. No rashes or lesions.     LABS:   Last CBC:   Recent Labs   Lab Test 05/29/24  0600   WBC 5.4   RBC 3.46*   HGB 10.4*   HCT 32.5*   MCV 94   MCH 30.1   MCHC 32.0   RDW 14.4          Last CMP:  Recent Labs   Lab Test 05/29/24  0600 05/22/24  0525 05/21/24  0535   *   < > 137   POTASSIUM 4.7   < > 4.1   CHLORIDE 104   < > 103   QAMAR 8.6*   < > 8.4*   CO2 19*   < > 19*   BUN 48.4*   < > 84.5*   CR 3.05*   < > 2.92*   *   < > 85   AST  --   --  34   ALT  --   --  38   BILITOTAL  --   --  0.5   ALBUMIN  --   --  3.7   PROTTOTAL  --   --  6.1*   ALKPHOS  --   --  110    < > = values in this interval not displayed.       IMAGING:  Results for orders placed or performed during the hospital encounter of 05/20/24   XR Chest 2 Views    Narrative    EXAM: XR CHEST 2 VIEWS  5/20/2024 2:12 PM      HISTORY: dyspnea    COMPARISON: 1/5/2024    FINDINGS: Two views of the chest. Implantable cardiac defibrillator  lead is in stable position. Postsurgical changes in the chest with  intact median sternotomy wires. Trachea is midline. Cardiac silhouette  is within normal limits. No focal consolidation. Trace bilateral  pleural effusions. No pneumothorax.       Impression    IMPRESSION: Trace bilateral pleural effusions without focal airspace  disease.    I have  personally reviewed the examination and initial interpretation  and I agree with the findings.    JOANNE MCMAHAN MD         SYSTEM ID:  [id number]   US Lower Extremity Venous Duplex Bilateral    Narrative    EXAMINATION: US LOWER EXTREMITY VENOUS DUPLEX BILATERAL  5/20/2024  4:04 PM      CLINICAL HISTORY: Leg swelling, short of breath    COMPARISON: 9/15/2020        PROCEDURE COMMENTS: Ultrasound was performed of the deep venous system  of the right and left lower extremity using grayscale, color, and  spectral Doppler.    FINDINGS:    The common femoral, greater saphenous origin, femoral, popliteal, and  deep calf veins are visualized and are patent. Venous waveforms are  normal. There is normal response to compression.      Impression    IMPRESSION: No deep vein thrombosis  demonstrated in the right or left  lower extremity.    I have personally reviewed the examination and initial interpretation  and I agree with the findings.    KENROY CASON MD         SYSTEM ID:  [id number]   NM Lung Scan Perfusion Particulate    Narrative    Examination: NM LUNG SCAN PERFUSION PARTICULATE       Date: 5/20/2024 5:19 PM     Indication: shortness of breath, elevated d-dimer, AKD     Comparison: Same-day chest x-ray    Additional Information: none    Technique:    The patient received 6.1 mCi of Tc-99m labeled MAA intravenously.  Anterior and posterior quantitative views were obtained of the lungs  using a dual headed camera camera. A standard eight view lung  perfusion scan was also obtained. SPECT-CT imaging of the chest was  subsequently performed.     Findings:    Planar perfusion images demonstrate wedge-shaped defects involving the  right lower zone     On SPECT images, there is a wedge-shaped defect involving the right  lower  lung with no responding consolidation or pulmonary opacity.  Apparent decrease perfusion to the lingular segments and anterior  right lung.    Limited CT imaging demonstrates trace left pleural  effusion,  borderline cardiomegaly, trace pericardial fluid, cholelithiasis and  partially imaged ascites and gastric bypass changes.       Impression    Impression:    1. Segmental appearing perfusion defects involving the right lower  lung without corresponding consolidation or radiographic pulmonary  opacity, concerning for pulmonary embolism as per PISAPED criteria  2. Additional incidental findings on limited CT as detailed above    I have personally reviewed the examination and initial interpretation  and I agree with the findings.    KENROY CASON MD         SYSTEM ID:  [id number]   CT Chest Abdomen Pelvis w/o Contrast     Value    Radiologist flags Pulmonary densities    Narrative    CT CHEST ABDOMEN PELVIS W/O CONTRAST 5/22/2024 6:01 PM    History: unprovoked ep, high cancer risk in a transplant patient, WALTER  so cannot get contrast    Comparison: CT abdomen and pelvis 1/3/2024    Technique: Helical CT acquisition from the lung apices to the pubic  symphysis was obtained without intravenous contrast. Axial, coronal,  and sagittal reconstructions were obtained and reviewed.    Contrast: None    Findings:     CHEST:     Lungs: Similar appearance of bibasilar reticular scarring along the  right lateral and posterior costophrenic angles. Small amount of  lingular atelectasis. Mildly irregular curvilinear density in the  medial left lower (4/130). Scattered sub-6 mm pulmonary nodules such  as a 3 mm nodule in the left upper lobe (series 4 image 58) are not  significantly changed from 12/15/2021. Trace left-sided pleural  effusion. No pneumothorax.  Airways: Central tracheobronchial tree is clear.  Vessels: Main pulmonary artery and aorta are normal in caliber. Common  origin of the left common carotid artery and right brachiocephalic  trunk. Aortic atherosclerosis. Abandoned left subclavian pacer lead.  Heart: Heart size is normal with trace pericardial fluid..  Lymph nodes: Limited evaluation of the hilum due  to noncontrast  technique. Prominent pretracheal lymph node measuring 1.3 cm in short  axis.  Thyroid: Within normal limits.  Esophagus: Decompressed with questionable distal thickening.    ABDOMEN PELVIS:    Limited evaluation of abdominal parenchymal organs due to noncontrast  technique. Mild motion related artifact limits the study.    Liver: No focal liver lesion  Biliary system: Cholelithiasis without signs of acute cholecystitis.  No intrahepatic or extrahepatic biliary ductal dilatation.  Pancreas: Normal noncontrast appearance of the pancreas.  Stomach: Postoperative changes of gastric bypass. Nondilated  appearance of the hepatobiliary limb..  Spleen: Within normal limits.  Adrenal glands: Mild nodular thickening of the glands.  Kidneys: No focal mass, hydronephrosis, or stone.  Bladder: Within normal limits.  Reproductive organs: Mild prostatomegaly  Gastrointestinal: No definite high-grade bowel obstruction. No  pneumatosis.  Lymph nodes: No intra-abdominal or pelvic lymphadenopathy.  Vasculature: Aortobiiliac atherosclerosis, normal caliber of the  abdominal aorta.  Peritoneum: Trace perihepatic ascites. No intraperitoneal free air. ML  applicable.    Soft tissues: Circumscribed appearing hypodensity anterior to the  right shoulder joint (5/16), may represent joint related effusion or  collection. Similar calcification in the right gluteal region  subcutaneous soft tissue. Bilateral gynecomastia.  Bones: No suspicious osseous lesion. similar chronic-appearing  fracture deformity of the left lateral 10th rib at the costochondral  junction.      Impression    IMPRESSION:      1. No overt mass within the chest, abdomen or pelvis on noncontrast  exam   2. Few sub-6 mm pulmonary nodules, similar to prior; irregular shaped  curvilinear density in the medial left lung, indeterminate, possibly  atelectatic. Recommend attention on follow-up.  3. Similar appearance of right basilar reticular scarring of  the  pulmonary parenchyma likely related to prior infection.  4. Cholelithiasis without signs of acute cholecystitis.  5. Trace left pleural effusion.  6. Relatively circumscribed hypodensity adjacent and anterior to the  right shoulder may represent joint effusion or collection, consider  correlation and MRI shoulder with contrast if clinically indicated.  7. Mild apparent distal esophageal thickening near the  gastroesophageal junction, possibly secondary to underdistention, or  inflammatory etiology, consider attention on follow-up and/or  endoscopy for further evaluation as clinically desired.  7. Additional incidental findings as above including mild  prostatomegaly.    [Recommend Follow Up: Pulmonary densities    This report will be copied to the [facility] [place] to ensure a  provider acknowledges the finding. [facility] Center is available Monday  through Friday 8am-3:30 pm.    I have personally reviewed the examination and initial interpretation  and I agree with the findings.    KENROY CASON MD         SYSTEM ID:  [id number]   US Renal Complete Non-Vascular    Narrative    EXAMINATION: US RENAL COMPLETE NON-VASCULAR, 5/22/2024 2:38 PM     COMPARISON: None.    HISTORY: WALTER, assessing for hydronephrosis    TECHNIQUE: The kidneys and bladder were scanned in the standard  fashion with specialized ultrasound transducer(s) using both gray  scale and limited color/spectral Doppler techniques.    FINDINGS:    Right kidney: Measures 10.0 cm in length. . No focal mass. No  hydronephrosis. Renal vascularity on color Doppler.    Left kidney: Measures 9.7 cm in length.. No hydronephrosis. Simple  appearing cyst in the inferior pole of left kidney measuring up to 9  mm. Renal vascularity on color Doppler.    Bladder: Unremarkable. Partially distended.      Impression    IMPRESSION: No hydronephrosis. Simple left renal cyst.    I have personally reviewed the examination and initial interpretation  and I agree with the  findings.    KENROY CASON MD         SYSTEM ID:  [id number]   MR Brain w/o & w Contrast    Narrative     MR BRAIN W/O & W CONTRAST 5/24/2024 10:04 PM    Provided History: persistent headache in a transplant/  immunosuppressed patient (>7 days), worse in the morning.  ICD-10:    Comparison: 12/16/2021.    Technique: Multiplanar T1-weighted, axial FLAIR, and susceptibility  images were obtained without intravenous contrast. Following  intravenous gadolinium-based contrast administration, axial  T2-weighted, diffusion, and T1-weighted images (in multiple planes)  were obtained.    Contrast: 9mL Gadavist     Findings:  There is no mass effect, midline shift, or evidence of intracranial  hemorrhage. The ventricles are proportionate to the cerebral sulci.  Normal major vascular intracranial flow-voids. There are scattered  areas of elevated diffusion signal however these areas also  demonstrate elevated signal on ADC map and T2-weighted imaging  consistent with T2 shine through. No evidence of infarct on  diffusion-weighted images. Scattered periventricular T2  hyperintensities likely sequelae of chronic small vessel ischemic  disease, similar to the prior exam. Scattered punctate areas of  susceptibility artifact similar to prior exam.    Postcontrast images demonstrate no abnormal intracranial enhancement.    No abnormality of the skull marrow signal. The visualized portions of  paranasal sinuses, and mastoid air cells are relatively clear. The  orbits are grossly unremarkable.      Impression    Impression:  1. No acute intracranial abnormality.  2. No enhancing lesion.  3. Scattered periventricular T2 hyperintensities are likely sequelae  of chronic small vessel ischemic disease.  4. There are scattered areas of punctate susceptibility artifact in a  similar distribution to 12/16/2021 possibly representing prior  microhemorrhages or early amyloid angiopathy.    I have personally reviewed the examination and initial  interpretation  and I agree with the findings.    CONI MEDINA MD         SYSTEM ID:  [id number]   Echo Limited     Value    LVEF  55-60%    Narrative    [id number]  [id number]  [id number]  [id number]^RIDGE^JOSSY^NKECHI     [facility],[place]  Echocardiography Laboratory  [address] [place] [id number]     Name: LOIS FULLER  MRN: [id number]  : 1955  Study Date: 2024 04:10 PM  Age: 68 yrs  Gender: Male  Patient Location: [facility]  Reason For Study: Dyspnea  Ordering Physician: JOSSY SABILLON  Performed By: Yudi Carrera RDCS     BSA: 2.0 m2  Height: 68 in  Weight: 195 lb  HR: 98  BP: 124/88 mmHg  ______________________________________________________________________________  Procedure  Limited Portable Echo Adult.  ______________________________________________________________________________  Interpretation Summary  Left ventricular size, wall motion and function are normal. The ejection  fraction is 55-60%.  Right ventricular function, chamber size, wall motion, and thickness are  normal.  Trace to mild tricuspid insufficiency is present.  IVC diameter >2.1 cm collapsing <50% with sniff suggests a high RA pressure  estimated at 15 mmHg or greater. No pericardial effusion is present.     No significant changes noted.  ______________________________________________________________________________  Left Ventricle  Left ventricular size, wall motion and function are normal. The ejection  fraction is 55-60%. Diastolic function not assessed due to heart transplant.  No regional wall motion abnormalities are seen.     Right Ventricle  Right ventricular function, chamber size, wall motion, and thickness are  normal.     Atria  Both atria appear normal.     Mitral Valve  The mitral valve is normal. Trace mitral insufficiency is present.     Aortic Valve  Aortic valve is normal in structure and function. No aortic regurgitation is  present.     Tricuspid Valve  The  tricuspid valve is normal. Trace to mild tricuspid insufficiency is  present. The peak velocity of the tricuspid regurgitant jet is not obtainable.     Pulmonic Valve  The pulmonic valve is normal.     Vessels  The aorta root is normal. IVC diameter >2.1 cm collapsing <50% with sniff  suggests a high RA pressure estimated at 15 mmHg or greater.     Pericardium  No pericardial effusion is present.     Compared to Previous Study  No significant changes noted.  ______________________________________________________________________________  Report approved by: Shayne MEYER 2024 04:46 PM     ______________________________________________________________________________      Echo Limited     Value    LVEF  55-60%    Narrative    [id number]  [id number]  [id number]  [id number]^AKI^BERENICE^ROBIN     [facility],[place]  Echocardiography Laboratory  [address] [place] [id number]     Name: LOIS FULLER  MRN: [id number]  : 1955  Study Date: 2024 07:54 AM  Age: 68 yrs  Gender: Male  Patient Location: [facility]  Reason For Study: Heart Failure, Heart Transplant on 2021  Ordering Physician: BERENICE PRABHAKAR  Performed By: Danna Morrison ALEXA     BSA: 2.1 m2  Height: 68 in  Weight: 203 lb  HR: 70  BP: 134/90 mmHg  ______________________________________________________________________________  Procedure  Limited Portable Echo Adult.  ______________________________________________________________________________  Interpretation Summary  Global and regional left ventricular function is normal with an EF of 55-60%.  Global right ventricular function is normal.  The right ventricle is normal size.  Dilation of the inferior vena cava is present with normal respiratory  variation in diameter.  No pericardial effusion is present.     This study was compared with the study from 2024 .IVC improved, otherwise  no  change.  ______________________________________________________________________________  Left Ventricle  Left ventricular size is normal. Left ventricular wall thickness is normal.  Global and regional left ventricular function is normal with an EF of 55-60%.     Right Ventricle  The right ventricle is normal size. Global right ventricular function is  normal.     Atria  Both atria appear normal.     Mitral Valve  The mitral valve is normal.     Aortic Valve  Aortic valve is normal in structure and function.     Tricuspid Valve  The tricuspid valve is normal. Trace tricuspid insufficiency is present.     Vessels  Dilation of the inferior vena cava is present with normal respiratory  variation in diameter.     Pericardium  No pericardial effusion is present.     Miscellaneous  No significant valvular abnormalities present.     Compared to Previous Study  This study was compared with the study from 5/20/2024 . IVC improved,  otherwise no change.  ______________________________________________________________________________  MMode/2D Measurements & Calculations     IVSd: 1.3 cm  LVIDd: 4.3 cm  LVIDs: 3.0 cm  LVPWd: 1.1 cm  FS: 29.9 %  LV mass(C)d: 183.4 grams  LV mass(C)dI: 89.2 grams/m2     EF(MOD-bp): 46.8 %  RWT: 0.53     ______________________________________________________________________________  Report approved by: Kailee LOERA 05/28/2024 09:07 AM         Cardiac Catheterization    Narrative      Right sided filling pressures are mildly elevated.    Left sided filling pressures are normal.    Mild elevated pulmonary hypertension.    Normal cardiac output level.    Successful endomyocardial biopsy. Results pending.    Fluoroscopy was used to visualize the right internal jugular vein.       *Note: Due to a large number of results and/or encounters for the requested time period, some results have not been displayed. A complete set of results can be found in Results Review.       PROCEDURES:  Right heart  catheterization and endomyocardial biopsy    CONSULTATIONS:   Nephrology  Electrophysiology  Social work  Pharmacy     DISCHARGE MEDICATIONS:  Current Discharge Medication List        START taking these medications    Details   bumetanide (BUMEX) 1 MG tablet Take 1 tablet (1 mg) by mouth daily  Qty: 30 tablet, Refills: 3    Comments: Or as directed by transplant or kidney team  Associated Diagnoses: Heart replaced by transplant (H)      calcium carbonate-vitamin D (OSCAL) 500-5 MG-MCG tablet Take 1 tablet by mouth daily  Qty: 60 tablet, Refills: 3    Associated Diagnoses: Heart replaced by transplant (H)      warfarin ANTICOAGULANT (COUMADIN) 1 MG tablet Take daily as directed by anticoagulation clinic  Qty: 90 tablet, Refills: 3    Associated Diagnoses: Single subsegmental pulmonary embolism without acute cor pulmonale (H)           CONTINUE these medications which have CHANGED    Details   mycophenolate (GENERIC EQUIVALENT) 250 MG capsule Take 2 capsules (500 mg) by mouth 2 times daily  Qty: 180 capsule, Refills: 3    Associated Diagnoses: S/P orthotopic heart transplant (H)      !! traMADol (ULTRAM) 50 MG tablet TAKE ONE TABLET BY MOUTH EVERY MORNING AND AFTERNOON AND 2 AT BEDTIME AS NEEDED FOR PAIN    Associated Diagnoses: S/P orthotopic heart transplant (H)       !! - Potential duplicate medications found. Please discuss with provider.        CONTINUE these medications which have NOT CHANGED    Details   acetaminophen (TYLENOL) 325 MG tablet Take 3 tablets (975 mg) by mouth every 8 hours as needed for mild pain  Qty: 100 tablet, Refills: 0    Associated Diagnoses: Small bowel obstruction (H)      albuterol (PROAIR HFA/PROVENTIL HFA/VENTOLIN HFA) 108 (90 Base) MCG/ACT inhaler Inhale 2 puffs into the lungs every 4 hours as needed for shortness of breath, wheezing or cough  Qty: 40.2 g, Refills: 2    Associated Diagnoses: Chronic obstructive pulmonary disease, unspecified COPD type (H)      allopurinol (ZYLOPRIM)  300 MG tablet TAKE 1 TABLET BY MOUTH DAILY  Qty: 100 tablet, Refills: 3    Comments: Please send a replace/new response with 100-Day Supply if appropriate to maximize member benefit. Requesting 1 year supply.  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, unspecified laterality      aspirin (ASA) 81 MG chewable tablet Take 1 tablet (81 mg) by mouth daily  Qty: 100 tablet, Refills: 1    Associated Diagnoses: S/P orthotopic heart transplant (H)      buPROPion (WELLBUTRIN XL) 300 MG 24 hr tablet Take 1 tablet (300 mg) by mouth every morning  Qty: 90 tablet, Refills: 1    Associated Diagnoses: Major depression, recurrent, chronic (H24)      cyanocobalamin (VITAMIN B-12) 1000 MCG tablet Take 1,000 mcg by mouth daily      diphenhydrAMINE (BENADRYL) 25 MG tablet Take 25 mg by mouth every 6 hours as needed for itching      gabapentin (NEURONTIN) 300 MG capsule Take 300 mg by mouth daily      ipratropium - albuterol 0.5 mg/2.5 mg/3 mL (DUONEB) 0.5-2.5 (3) MG/3ML neb solution Take 1 vial by nebulization 4 times daily as needed for shortness of breath, wheezing or cough      Melatonin 10 MG CAPS Take 1 capsule by mouth nightly as needed      montelukast (SINGULAIR) 10 MG tablet TAKE 1 TABLET BY MOUTH AT  BEDTIME  Qty: 100 tablet, Refills: 3    Comments: Please send a replace/new response with 100-Day Supply if appropriate to maximize member benefit. Requesting 1 year supply.  Associated Diagnoses: S/P orthotopic heart transplant (H)      polyethylene glycol (MIRALAX) 17 GM/Dose powder Take 1 Capful by mouth daily as needed for constipation      !! tacrolimus (GENERIC EQUIVALENT) 0.5 MG capsule Take 0.5 mg by mouth every morning With 3 mg capsules    Comments: TXP DT 5/6/2021 (Heart) TXP Dischg DT 5/31/2021 DX Heart replaced by transplant Z94.1 TX [facility] ([place], [place])  Associated Diagnoses: S/P orthotopic heart transplant (H)       !! tacrolimus (GENERIC EQUIVALENT) 1 MG capsule TAKE 3 CAPSULES BY MOUTh IN am and in pm.  Qty: 360 capsule, Refills: 11    Associated Diagnoses: S/P orthotopic heart transplant (H)      !! traMADol (ULTRAM) 50 MG tablet Take 50 mg by mouth every 6 hours as needed for severe pain      VITAMIN D PO Take 1 capsule by mouth daily      rosuvastatin (CRESTOR) 10 MG tablet Take 1 tablet (10 mg) by mouth daily  Qty: 90 tablet, Refills: 0    Associated Diagnoses: Hyperlipidemia LDL goal <70       !! - Potential duplicate medications found. Please discuss with provider.          DISCHARGE DISPOSITION: Jim Willingham will discharge to home in stable condition.     DISCHARGE INSTRUCTIONS:  Discharge Procedure Orders   Basic metabolic panel   Standing Status: Future Standing Exp. Date: 05/29/25     INR   Standing Status: Future Standing Exp. Date: 05/29/25     ANTICOAGULATION CLINIC REFERRAL   Referral Priority: Routine   Referred to Provider: CATALINA ODELL   Number of Visits Requested: 1     ANTICOAGULATION CLINIC REFERRAL   Referral Priority: Routine     Adult Oncology/Hematology  Referral   Standing Status: Future   Referral Priority: Routine: Next available opening Referral Type: Consultation   Requested Specialty: Medical Oncology   Number of Visits Requested: 1     Reason for your hospital stay   Order Comments: Pulmonary embolism     Activity   Order Comments: Your activity upon discharge: activity as tolerated     Order Specific Question Answer Comments   Is discharge order? Yes      Follow Up ([facility])   Order Comments: Follow up with transplant clinic within ~3-4 weeks for hospital follow- up.      Appointments on [place] and/or [facility] (with [facility] or [facility] provider or service). Call [phone] if you haven't heard regarding these appointments within 7 days of discharge.     Diet   Order Comments: Follow this diet upon discharge: low potassium diet     Order Specific Question Answer  Comments   Is discharge order? Yes        60 minutes spent in discharge, including >50% in counseling and coordination of care, medication review and plan of care recommended on follow up. Questions were answered, patient agrees to plan.

## 2024-05-29 NOTE — PLAN OF CARE
Shift: [id number]    Neuro: A&Ox4, calls appropriately, lets needs be known  VS: VSS  Respiratory: RA, lung sounds clear, no complaints of SOB.  Cardiac: Sinus Rhythm with 1st degree AV block, no complaints of chest pain, afebrile  Pain: Pt had a slight head ache this morning, tylenol given x1 for pain relief.  GI/: Adequately voiding via bedside urinal, no BM during shift. No complaints of N/V.  Diet: Regular diet  IV/Drips: Right PIV x2  Activity: Up independent  Skin/Drains: No overt deficits to note    P: Report Changes to treatment team Cards 2

## 2024-05-29 NOTE — PROGRESS NOTES
DISCHARGE   Discharged to: Home  Via: Automobile  Accompanied by: Family  Discharge Instructions: diet, activity, medications, follow up appointments, when to call the MD, and what to watchout for (i.e. s/s of infection, increasing SOB, palpitations, chest pain,)  Prescriptions: To be filled by [facility] pharmacy per pt's request; medication list reviewed & sent with pt  Follow Up Appointments: arranged; information given  Belongings: All sent with pt  IV: out  Telemetry: off  Pt exhibits understanding of above discharge instructions; all questions answered.  Discharge Paperwork: sent with patient.    Per Rajani DUMONT CNP: told patient to stop taking aspirin while on warfarin.

## 2024-05-29 NOTE — PROGRESS NOTES
Nephrology progress note   05/29/2024      Jim Willingham MRN:[id number] YOB: 1955  Date of Admission:5/20/2024  Primary care provider: Ricardo Gómez  Requesting physician: iRaz Montaño MD    ASSESSMENT AND RECOMMENDATIONS:   Jim Willingham is a 67 yo male with a history of NICM (s/p HM2 LVAD 6/2017) s/p OHT 5/6/21, gastric bypass, CKD 3, DM type II, gout, and COPD who presented for RESENDIZ and was found to have a PE. Nephrology was consulted for WALTER.    # WALTER on CKD stage 3b  Etiology still unclear, though has had a couple days of very gradual improvement in Cr with holding lasix and prn IV fluids.   CKD was diagnosed years ago; pt does not recall being told the etiology. May be due to pt's DM, HTN, or past cardiorenal from heart failure. Baseline Cr ~1.4-1.8. Cr 3 on admission. Initial workup with bland UA, no hydronephrosis on renal US, most recent tacrolimus level 4.8. New DSA noted, but RHC today reveal RA 10, wedge 15. Reported baseline weight 192-195 lbs. Cr improved to 2.8 with diuretics but has not improved further thus far. Pt was hypotensive with SBP 80s on 5/22 but Cr did not change much after this episode. Suspect initially prerenal/cardiorenal vs EMILY (with tacro use), then possible over-diuresis with Lasix.   At this point we still think the main mechanism behind his WALTER is unclear but there might be a component of impaired renal autoregulation on a background of pertinent cardiac HX and being chronically on tac.   -Will discuss with primary team diuretics as he appears to be euvolemic  -TMA ruled out   -C/w low K diet   -low BK viral levels at 4k unlikely WALTER is related to BK nephropathy given lack of hematuria and low titers   -C3, C4, ANCA, dsDNA (ordered for you)  -plasma oxalate, 24h urine oxalate due to history of gastric bypass and possibility of oxalate nephropathy (ordered for you)  -Kidney biopsy at this time will not be recommended due to anticoagulation status      #  Status post OHT on 5/2021, history of NICM  # Chronic immunosuppression  DSAs:  NEW DSA noted this admission   -follow-up heart biopsy results     Normocytic anemia, chronic  Baseline hgb ~11. Hgb 10 at the time of consult without overt signs of bleeding. Pt took venofir in the past.   - Ordered iron studies showing iron 16 and iron sat 5%  -s/p venofer by primary team     BMD  Mild hyperphosphatemia likely iso WALTER.  - CTM    Recommendations were communicated to primary team via verbal/note    Seen and discussed with Dr. Cris Song MD    REASON FOR CONSULT: WALTER     Interval events   Cr 3.0 (stable)   Still euvolemic   Off heparin drip   Bumex 1  No orthostasis today   UO 2.3L Since MN      PAST MEDICAL HISTORY:  Reviewed with patient on 05/29/2024     Past Medical History:   Diagnosis Date    Bariatric surgery status 2003    Benign essential hypertension 05/11/2017    Bilateral carpal tunnel syndrome 11/02/2020    BPH with obstruction/lower urinary tract symptoms 03/21/2024    Cardiomyopathy, unspecified (H) 05/08/2017    CKD (chronic kidney disease) stage 3, GFR 30-59 ml/min (H) 05/11/2017    COPD (chronic obstructive pulmonary disease) (H) 11/02/2020    Depression 05/11/2017    Diabetes mellitus (H) 1995    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    Generalized osteoarthritis 09/26/2023    Gouty arthropathy, chronic, without tophi 11/02/2020    H/O adenomatous polyp of colon 08/03/2016    2 polyps    H/O gastric bypass 05/11/2017    History of type 2 diabetes mellitus 03/28/2023    Hyperlipidemia LDL goal <70 09/27/2022    ICD (implantable cardioverter-defibrillator), biventricular, in situ 05/11/2017    IFG (impaired fasting glucose) 09/26/2023    LVAD (left ventricular assist device) present (H)     Major depression, recurrent, chronic (H24) 11/02/2020    Mild anemia 03/18/2022    NICM (nonischemic cardiomyopathy) (H)/ EF 20% 05/11/2017    ECHO: LVEDd. 7.66 cm, Restrictive pattern , Severe  mitral valve regurgitation    CECILIA (obstructive sleep apnea) 05/11/2017    Paroxysmal atrial fibrillation (H) 05/11/2017    Paroxysmal VT (H) 05/11/2017    Peripheral polyneuropathy 03/28/2023    Postsurgical dumping syndrome 03/21/2024    Pulmonary cavitary lesion 11/18/2021    Rotator cuff tear arthropathy of both shoulders 11/02/2020    Type 2 diabetes mellitus with diabetic polyneuropathy (H) 03/18/2022    Uncomplicated asthma     Vitamin B12 deficiency (non anemic) 05/11/2017       Past Surgical History:   Procedure Laterality Date    ANESTHESIA CARDIOVERSION N/A 05/11/2020    Procedure: ANESTHESIA, FOR CARDIOVERSION @1100;  Surgeon: GENERIC ANESTHESIA PROVIDER;  Location: [facility]    BRONCHOSCOPY (RIGID OR FLEXIBLE), DIAGNOSTIC N/A 8/30/2021    Procedure: BRONCHOSCOPY, WITH BRONCHOALVEOLAR LAVAGE;  Surgeon: Perlman, David Morris, MD;  Location: [facility]    COLONOSCOPY N/A 4/18/2024    Procedure: COLONOSCOPY, WITH POLYPECTOMY;  Surgeon: Jermaine Root MD;  Location: [facility]    CV CORONARY ANGIOGRAM N/A 5/17/2022    Procedure: Coronary Angiogram;  Surgeon: Jeffrey iGbson MD;  Location:  [facility] CARDIAC CATH LAB    CV CORONARY ANGIOGRAM N/A 5/23/2023    Procedure: Coronary Angiogram;  Surgeon: Scout Robins MD;  [facility]    CV HEART BIOPSY N/A 5/13/2021    Procedure: Heart Biopsy;  Surgeon: Scout Robins MD;  [facility]    CV HEART BIOPSY N/A 5/20/2021    Procedure: Heart Biopsy;  Surgeon: Jeffrey Gibson MD;  [facility]    CV HEART BIOPSY N/A 5/27/2021    Procedure: Heart Biopsy;  Surgeon: Jac Dover MD;  [facility]    CV HEART BIOPSY N/A 6/7/2021    Procedure: CV HEART BIOPSY;  Surgeon: Jeffrey Gibson MD;  [facility]    CV HEART BIOPSY N/A 6/21/2021    Procedure: CV HEART BIOPSY;  Surgeon: Scout Robins  MD;  [facility]    CV HEART BIOPSY N/A 7/5/2021    Procedure: CV HEART BIOPSY;  Surgeon: Jeffrey Gibson MD;  [facility]    CV HEART BIOPSY N/A 7/16/2021    Procedure: Heart Biopsy;  Surgeon: Amadeo Art MD;  [facility]    CV HEART BIOPSY N/A 8/5/2021    Procedure: Heart Biopsy;  Surgeon: Jeffrey Gibson MD;  [facility]    CV HEART BIOPSY N/A 8/31/2021    Procedure: Heart Cath Heart Biopsy;  Surgeon: Jeffrey Gibson MD;  [facility]    CV HEART BIOPSY N/A 9/21/2021    Procedure: CV HEART BIOPSY;  Surgeon: Jeffrey Gibson MD;  Location:  [facility] CARD[facility] CATH LAB    CV HEART BIOPSY N/A 10/5/2021    Procedure: CV HEART BIOPSY;  Surgeon: Jeffrey Gibson MD;  [facility]    CV HEART BIOPSY N/A 11/4/2021    Procedure: CV HEART BIOPSY;  Surgeon: Nicola Seth MD;  Location:  HE[facility] CARDI[facility] CATH LAB    CV HEART BIOPSY N/A 5/17/2022    Procedure: Heart Biopsy;  Surgeon: Jeffrey Gibson MD;  Location:  [facility]ARD[facility] CATH LAB    CV HEART BIOPSY N/A 5/23/2023    Procedure: Heart Biopsy;  Surgeon: Scout Robins MD;  [facility]    CV HEART BIOPSY N/A 5/23/2024    Procedure: Heart Biopsy;  Surgeon: Precious Bautista MD;  Location:  [facility] CARD[facility] CATH LAB    CV RIGHT HEART CATH MEASUREMENTS RECORDED N/A 07/24/2019    Procedure: CV RIGHT HEART CATH;  Surgeon: Renu Sears MD;  Location:  [facility]ARD[facility] CATH LAB    CV RIGHT HEART CATH MEASUREMENTS RECORDED N/A 08/05/2020    Procedure: CV RIGHT HEART CATH;  Surgeon: Nicola Seth MD;  Location:  [facility] CARD[facility] CATH LAB    CV RIGHT HEART CATH MEASUREMENTS RECORDED N/A 01/07/2021    Procedure: CV RIGHT HEART CATH;  Surgeon: Jac Dover MD;  Location:  [facility] CARDIAC CATH LAB    CV RIGHT  HEART CATH MEASUREMENTS RECORDED N/A 02/23/2021    Procedure: Heart Cath Right Heart Cath;  Surgeon: Jeffrey Gibson MD;  Location:  [facility] CARD[facility] CATH LAB    CV RIGHT HEART CATH MEASUREMENTS RECORDED N/A 03/23/2021    Procedure: Heart Cath Right Heart Cath. request for 3/23;  Surgeon: Jeffrey Gibson MD;  Location:  [facility]ARD[facility] CATH LAB    CV RIGHT HEART CATH MEASUREMENTS RECORDED N/A 5/13/2021    Procedure: Right Heart Cath;  Surgeon: Scout Robins MD;  Location:  [facility] CARD[facility] CATH LAB    CV RIGHT HEART CATH MEASUREMENTS RECORDED N/A 5/20/2021    Procedure: Right Heart Cath;  Surgeon: Jeffrey Gibson MD;  Location:  [facility]ARD[facility] CATH LAB    CV RIGHT HEART CATH MEASUREMENTS RECORDED N/A 5/27/2021    Procedure: Right Heart Cath;  Surgeon: Jac Dover MD;  Location:  HE[facility] CARDI[facility] CATH LAB    CV RIGHT HEART CATH MEASUREMENTS RECORDED N/A 6/7/2021    Procedure: CV RIGHT HEART CATH;  Surgeon: Jeffrey Gibson MD;  Location:  [facility] CARD[facility] CATH LAB    CV RIGHT HEART CATH MEASUREMENTS RECORDED N/A 6/21/2021    Procedure: CV RIGHT HEART CATH;  Surgeon: Scout Robins MD;  Location:  HE[facility] CARDI[facility] CATH LAB    CV RIGHT HEART CATH MEASUREMENTS RECORDED N/A 7/5/2021    Procedure: CV RIGHT HEART CATH;  Surgeon: Jeffrey Gibson MD;  Location:  HE[facility] CARDIAC CATH LAB    CV RIGHT HEART CATH MEASUREMENTS RECORDED N/A 7/16/2021    Procedure: Right Heart Cath;  Surgeon: Amadeo Art MD;  Location:  HE[facility] CARDIAC CATH LAB    CV RIGHT HEART CATH MEASUREMENTS RECORDED N/A 8/5/2021    Procedure: CV RIGHT HEART CATH;  Surgeon: Jeffrey Gibson MD;  Location:  [facility] CARDI[facility] CATH LAB    CV RIGHT HEART CATH MEASUREMENTS RECORDED N/A 8/31/2021    Procedure: Heart Cath Right Heart Cath;  Surgeon: Jeffrey Gibson MD;  [facility]    CV RIGHT HEART CATH  MEASUREMENTS RECORDED N/A 9/21/2021    Procedure: CV RIGHT HEART CATH;  Surgeon: Jeffrey Gibson MD;  Location:  [facility]ARD[facility] CATH LAB    CV RIGHT HEART CATH MEASUREMENTS RECORDED N/A 10/5/2021    Procedure: CV RIGHT HEART CATH;  Surgeon: Jeffrey Gibson MD;  Location:  [facility] CARD[facility] CATH LAB    CV RIGHT HEART CATH MEASUREMENTS RECORDED N/A 11/4/2021    Procedure: CV RIGHT HEART CATH;  Surgeon: Nicola Seth MD;  Location:  [facility] CARD[facility] CATH LAB    CV RIGHT HEART CATH MEASUREMENTS RECORDED N/A 5/17/2022    Procedure: Right Heart Catheterization;  Surgeon: Jeffrey Gibson MD;  Location:  [facility] CARD[facility] CATH LAB    CV RIGHT HEART CATH MEASUREMENTS RECORDED N/A 5/23/2023    Procedure: Right Heart Catheterization;  Surgeon: Scout Robins MD;  Location:  [facility] CARDI[facility] CATH LAB    CV RIGHT HEART CATH MEASUREMENTS RECORDED N/A 5/23/2024    Procedure: Right Heart Cath- pre noon with rush path;  Surgeon: Precious Bautista MD;  Location:  HE[facility] CARDIAC CATH LAB    GI SURGERY  2003    [facility]    INSERT VENTRICULAR ASSIST DEVICE LEFT (HEARTMATE II) N/A 06/19/2017    Procedure: INSERT VENTRICULAR ASSIST DEVICE LEFT (HEARTMATE II);  Median Sternotomy Heartmate II Left Ventricular Assist Device Insertion on Pump Oxygenator;  Surgeon: Ronnie Quigley MD;  Location:  OR    IR CHEST TUBE PLACEMENT NON-TUNNELED RIGHT  7/11/2021    LAPAROSCOPY DIAGNOSTIC (GENERAL) N/A 1/4/2024    Procedure: Diagnostic Laparoscopy, Exploratory Laparotomy with Extensive lysis of adhesions.;  Surgeon: Frederick Montgomery MD;  Location: [facility]    ORTHOPEDIC SURGERY  1994    right knee wired    PICC DOUBLE LUMEN PLACEMENT Right 09/23/2020    5FR PICC DL. Length-43cm (1cm out).    PICC INSERTION - DOUBLE LUMEN Right 05/09/2021    IK/BRACH    RELEASE CARPAL TUNNEL BILATERAL Bilateral 02/18/2021    Procedure: Bilateral carpal tunnel release;  Surgeon: Jermaine Brand MD;  Location:  OR     TRANSPLANT HEART RECIPIENT N/A 05/05/2021    Procedure: Redo median sternotomy, lysis of adhesions, heart transplant recipient, on cardiopulmonary bypass, intraoperative transesophageal echocardiogram per anesthesia, Implantable Cardioverter Defibrillator (ICD) removal;  Surgeon: Ronnie Quigley MD;  Location:  OR        MEDICATIONS:  PTA Meds  Prior to Admission medications    Medication Sig Last Dose Taking? Auth Provider Long Term End Date   acetaminophen (TYLENOL) 325 MG tablet Take 3 tablets (975 mg) by mouth every 8 hours as needed for mild pain 5/19/2024 at unknown Yes Frederick Montgomery MD No    albuterol (PROAIR HFA/PROVENTIL HFA/VENTOLIN HFA) 108 (90 Base) MCG/ACT inhaler Inhale 2 puffs into the lungs every 4 hours as needed for shortness of breath, wheezing or cough 5/20/2024 at am Yes Ricardo Gómez MD Yes    allopurinol (ZYLOPRIM) 300 MG tablet TAKE 1 TABLET BY MOUTH DAILY 5/20/2024 at am Yes Ricardo Gómez MD No    aspirin (ASA) 81 MG chewable tablet Take 1 tablet (81 mg) by mouth daily 5/20/2024 at am Yes Clara Valentin PA-C     buPROPion (WELLBUTRIN XL) 300 MG 24 hr tablet Take 1 tablet (300 mg) by mouth every morning 5/20/2024 at am Yes Ricardo Gómez MD Yes    cyanocobalamin (VITAMIN B-12) 1000 MCG tablet Take 1,000 mcg by mouth daily Unknown at unknown Yes Unknown, Entered By History     diphenhydrAMINE (BENADRYL) 25 MG tablet Take 25 mg by mouth every 6 hours as needed for itching Past Week at unknown Yes Reported, Patient No    gabapentin (NEURONTIN) 300 MG capsule Take 300 mg by mouth daily 5/20/2024 at am Yes Reported, Patient No    ipratropium - albuterol 0.5 mg/2.5 mg/3 mL (DUONEB) 0.5-2.5 (3) MG/3ML neb solution Take 1 vial by nebulization 4 times daily as needed for shortness of breath, wheezing or cough Unknown at unknown Yes Reported, Patient No    Melatonin 10 MG CAPS Take 1 capsule by mouth nightly as needed Past Week at unknown Yes Reported, Patient     montelukast (SINGULAIR)  10 MG tablet TAKE 1 TABLET BY MOUTH AT  BEDTIME 5/19/2024 at pm Yes Ricardo Gómez MD Yes    mycophenolate (GENERIC EQUIVALENT) 250 MG capsule TAKE one CAPSULE BY MOUTH TWICE A DAY 5/20/2024 at am Yes Delisa Montgomery MD Yes    polyethylene glycol (MIRALAX) 17 GM/Dose powder Take 1 Capful by mouth daily as needed for constipation Unknown at unknown Yes Reported, Patient     tacrolimus (GENERIC EQUIVALENT) 0.5 MG capsule Take 0.5 mg by mouth every morning With 3 mg capsules 5/20/2024 at am Yes Delisa Montgomery MD     tacrolimus (GENERIC EQUIVALENT) 1 MG capsule TAKE 3 CAPSULES BY MOUTh IN am and in pm. 5/20/2024 at am Yes Delisa Montgomery MD     traMADol (ULTRAM) 50 MG tablet Take 50 mg by mouth every 6 hours as needed for severe pain Unknown at unknown Yes Reported, Patient     VITAMIN D PO Take 1 capsule by mouth daily 5/20/2024 at am Yes Reported, Patient     rosuvastatin (CRESTOR) 10 MG tablet Take 1 tablet (10 mg) by mouth daily   Ricardo Gómez MD Yes       Current Meds  Current Facility-Administered Medications   Medication Dose Route Frequency Provider Last Rate Last Admin    allopurinol (ZYLOPRIM) tablet 300 mg  300 mg Oral Daily Emmanuel Hernandez MD   300 mg at 05/28/24 0826    aspirin (ASA) chewable tablet 81 mg  81 mg Oral Daily Emmanuel Hernandez MD   81 mg at 05/28/24 0826    bumetanide (BUMEX) tablet 1 mg  1 mg Oral Daily Rajani Wilder APRN CNP        buPROPion (WELLBUTRIN XL) 24 hr tablet 300 mg  300 mg Oral QAM Emmanuel Hernandez MD   300 mg at 05/28/24 0826    calcium carbonate-vitamin D (OSCAL) 500-5 MG-MCG per tablet 1 tablet  1 tablet Oral Daily Shelia Means NP   1 tablet at 05/28/24 0826    guaiFENesin (MUCINEX) 12 hr tablet 1,200 mg  1,200 mg Oral BID Rajani Wilder APRN CNP   1,200 mg at 05/28/24 2212    melatonin tablet 10 mg  10 mg Oral At Bedtime Mirian Oviedo MD   10 mg at 05/28/24 1642    montelukast (SINGULAIR) tablet 10 mg  10 mg Oral At Bedtime Emmanuel Hernandez  MD   10 mg at 05/28/24 2213    mycophenolate (GENERIC EQUIVALENT) capsule 500 mg  500 mg Oral BID IS Didi Butler PA-C   500 mg at 05/28/24 1749    polyethylene glycol (MIRALAX) Packet 17 g  17 g Oral Daily Renata Cespedes MD   17 g at 05/27/24 0832    rosuvastatin (CRESTOR) tablet 10 mg  10 mg Oral Daily Emmanuel Hernandez MD   10 mg at 05/28/24 0826    tacrolimus (GENERIC EQUIVALENT) capsule 3 mg  3 mg Oral QPM Emmanuel Hernandez MD   3 mg at 05/28/24 1749    tacrolimus (GENERIC EQUIVALENT) capsule 3.5 mg  3.5 mg Oral QAM Emmanuel Hernandez MD   3.5 mg at 05/28/24 0825    warfarin ANTICOAGULANT (COUMADIN) half-tab 1.5 mg  1.5 mg Oral ONCE at 18:00 Delisa Montgomery MD        Warfarin Dose Required Daily - Pharmacist Managed  1 each Oral See Admin Instructions Didi Butler PA-C         Infusion Meds  Current Facility-Administered Medications   Medication Dose Route Frequency Provider Last Rate Last Admin    Reason ACE/ARB/ARNI order not selected   Other DOES NOT GO TO Danay Perez MD        Reason beta blocker not prescribed   Does not apply DOES NOT GO TO Danay Perez MD           ALLERGIES:    Allergies   Allergen Reactions    Grass Shortness Of Breath    Ace Inhibitors Cough    Cats     Dust Mites Other (See Comments)     Asthma    Mold Other (See Comments)     Asthma    Penicillins Other (See Comments)     Unknown - childhood exposure    Tolerated Zosyn 9/18-9/20/2020    Per patient report he has tolerated amoxicillin    Sulfa Antibiotics Other (See Comments) and Unknown     Unknown childhood reaction       REVIEW OF SYSTEMS:  A comprehensive of systems was negative except as noted above.    SOCIAL HISTORY:   Social History     Socioeconomic History    Marital status:      Spouse name: Not on file    Number of children: Not on file    Years of education: Not on file    Highest education level: Not on file   Occupational History    Not on file   Tobacco Use    Smoking  status: Former     Current packs/day: 0.00     Types: Cigarettes     Quit date:      Years since quittin.4     Passive exposure: Never    Smokeless tobacco: Never   Vaping Use    Vaping status: Never Used   Substance and Sexual Activity    Alcohol use: No    Drug use: No    Sexual activity: Yes     Partners: Female   Other Topics Concern    Parent/sibling w/ CABG, MI or angioplasty before 65F 55M? No   Social History Narrative    Not on file     Social Determinants of Health     Financial Resource Strain: High Risk (2023)    Financial Resource Strain     Within the past 12 months, have you or your family members you live with been unable to get utilities (heat, electricity) when it was really needed?: Yes   Food Insecurity: Low Risk  (2023)    Food Insecurity     Within the past 12 months, did you worry that your food would run out before you got money to buy more?: No     Within the past 12 months, did the food you bought just not last and you didn t have money to get more?: No   Transportation Needs: Low Risk  (2023)    Transportation Needs     Within the past 12 months, has lack of transportation kept you from medical appointments, getting your medicines, non-medical meetings or appointments, work, or from getting things that you need?: No   Physical Activity: Not on file   Stress: Not on file   Social Connections: Not on file   Interpersonal Safety: Low Risk  (2023)    Interpersonal Safety     Do you feel physically and emotionally safe where you currently live?: Yes     Within the past 12 months, have you been hit, slapped, kicked or otherwise physically hurt by someone?: No     Within the past 12 months, have you been humiliated or emotionally abused in other ways by your partner or ex-partner?: No   Housing Stability: Low Risk  (2023)    Housing Stability     Do you have housing? : Yes     Are you worried about losing your housing?: No       FAMILY MEDICAL HISTORY:   Family  "History   Problem Relation Age of Onset    Cerebrovascular Disease Mother 64    Diabetes Mother     Hypertension Mother     Coronary Artery Disease Father     Diabetes Type 2  Father     Obesity Brother     Obesity Brother     Cerebrovascular Disease Daughter 40       PHYSICAL EXAM:   Temp  Av.2  F (36.8  C)  Min: 97.4  F (36.3  C)  Max: 98.8  F (37.1  C)      Pulse  Av.6  Min: 54  Max: 104 Resp  Av.8  Min: 16  Max: 20  SpO2  Av.1 %  Min: 97 %  Max: 100 %       /76 (BP Location: Left arm)   Pulse 92   Temp 98.4  F (36.9  C) (Oral)   Resp 18   Ht 1.727 m (5' 8\")   Wt 88.6 kg (195 lb 4.8 oz)   SpO2 99%   BMI 29.70 kg/m       General Appearance: NAD, lying comfortably in bed  Respiratory: CTA bilaterally without crackles or wheezes   Cardiovascular: RRR without murmurs  GI: Soft, nontender, nondistended, normal active bowel sounds  Ext: No BLE   Neuro: A&Ox3  Skin: No rashes or lesions    Date 24 07 - 24 0659   Shift 4552-4394 5409-2758 2332-9301 24 Hour Total   INTAKE   P.O. 555   555   Shift Total(mL/kg) 555(6.2)   555(6.2)   OUTPUT   Urine 200   200   Shift Total(mL/kg) 200(2.23)   200(2.23)   Weight (kg) 89.5 89.5 89.5 89.5      Admit Weight: 91.4 kg (201 lb 9.6 oz)       LABS:   I have reviewed the following labs:  CMP  Recent Labs   Lab 24  0600 24  0607 24  0526 24  0556 24  1626 24  0511   * 134* 133* 132*   < > 137   POTASSIUM 4.7 4.9 5.1 4.7   < > 4.3   CHLORIDE 104 103 102 102   < > 104   CO2 19* 19* 19* 18*   < > 21*   ANIONGAP 10 12 12 12   < > 12   * 105* 87 94   < > 99   BUN 48.4* 45.9* 50.3* 50.3*   < > 70.4*   CR 3.05* 2.97* 3.07* 2.74*   < > 2.86*   GFRESTIMATED 22* 22* 22* 25*   < > 24*   QAMAR 8.6* 8.8 8.8 9.0   < > 8.9   MAG 2.2 2.3 2.3 2.2   < > 2.1   PHOS  --   --   --   --   --  4.6*    < > = values in this interval not displayed.     CBC  Recent Labs   Lab 24  0600 24  0607 24  0526 " 05/26/24  0556   HGB 10.4* 10.5* 9.9* 9.9*   WBC 5.4 5.6 6.0 6.0   RBC 3.46* 3.59* 3.38* 3.42*   HCT 32.5* 33.8* 32.0* 32.3*   MCV 94 94 95 94   MCH 30.1 29.2 29.3 28.9   MCHC 32.0 31.1* 30.9* 30.7*   RDW 14.4 14.0 13.9 13.8    215 188 206     INR  Recent Labs   Lab 05/29/24  0600 05/28/24  0607 05/27/24  0526 05/26/24  0556 05/24/24  0447 05/23/24 2057   INR 3.35* 2.72* 2.10* 1.60*   < >  --    PTT  --   --   --   --   --  87*    < > = values in this interval not displayed.     ABGNo lab results found in last 7 days.   URINE STUDIES  Recent Labs   Lab Test 05/21/24  1306 05/20/24  1351 03/21/24  1007 01/06/24  1351   COLOR Light Yellow Straw Yellow Light Yellow   APPEARANCE Clear Clear Clear Clear   URINEGLC Negative Negative Negative Negative   URINEBILI Negative Negative Negative Negative   URINEKETONE Trace* Negative Negative Negative   SG 1.015 1.007 1.015 1.017   UBLD Negative Negative Trace* Moderate*   URINEPH 5.5 6.5 5.5 5.5   PROTEIN Negative Negative Trace* 50*   UROBILINOGEN  --   --  0.2  --    NITRITE Negative Negative Negative Negative   LEUKEST Negative Negative Trace* Negative   RBCU <1 0 0-2 1   WBCU <1 <1 5-10* 1     No lab results found.  PTH  Recent Labs   Lab Test 03/28/23  1129   PTHI 529*     IRON STUDIES  Recent Labs   Lab Test 05/23/24  0511 08/05/21  0931 05/13/21  0534 02/09/21  0518 01/21/21  1350 09/10/20  0830 09/19/19  1129 07/31/19  1050 07/03/19  0828 11/20/18  0934 10/19/18  1007 07/06/18  0856 05/24/17  0652   IRON 16* 30* 38 28* 36 29* 139 40 51 48 43 34* 63    240 215* 285 381 294 348 367 367 353 341 430 248   IRONSAT 5* 13* 18 10* 10* 10* 40 11* 14* 14* 13* 8* 25   VITA 24* 98 98 33  --   --   --  41 46 71  --  45 200       IMAGING:  US renal  IMPRESSION: No hydronephrosis. Simple left renal cyst.      Connie Song MD

## 2024-05-29 NOTE — TELEPHONE ENCOUNTER
ANTICOAGULATION  MANAGEMENT: Discharge Review    Jim Willingham chart reviewed for anticoagulation continuity of care    Hospital Admission on -24 for New PE.    Discharge disposition: Home    Results:    Recent labs: (last 7 days)     24  0511 24  1626 24  0447 24  0435 24  0556 24  1412 24  2226 24  0526 24  0607 24  1352 24  0600   INR 1.29* 1.30* 1.26* 1.40* 1.60*  --   --  2.10* 2.72*  --  3.35*   AAUFH 0.21  --  0.42 0.56 0.73 0.71 0.48 0.40 <0.10 <0.10  --      Anticoagulation inpatient management:     See calendar    Anticoagulation discharge instructions:     Warfarin dosin.5mg daily with an INR check on 24   Bridging: No   INR goal change: No      Medication changes affecting anticoagulation: No    Additional factors affecting anticoagulation: No     PLAN     Agree with discharge plan for follow up on 24    My chart message was sent    Anticoagulation Calendar updated    Maru Alonso RN

## 2024-05-31 NOTE — PROGRESS NOTES
ANTICOAGULATION MANAGEMENT     Jim Willingham 68 year old male is on warfarin with therapeutic INR result. (Goal INR 2.0-3.0)    Recent labs: (last 7 days)     05/31/24  1001   INR 2.69*       ASSESSMENT     Source(s): Chart Review and Patient/Caregiver Call     Warfarin doses taken: Warfarin taken as instructed  Diet: No new diet changes identified  Medication/supplement changes: None noted  New illness, injury, or hospitalization: Yes: Previously documented. Recent PE.   Signs or symptoms of bleeding or clotting: No  Previous result: Supratherapeutic  Additional findings:  Patient previously on Warfarin - restarted due to PE (taking between 7.5-10mg daily). New start day 9. Discharged from hospital 5/29 on smaller dose. 7 day average of warfarin used to establish maintenance dose.         PLAN     Recommended plan for temporary change(s) affecting INR     Dosing Instructions: Increase your warfarin dose to reflect 7 day average dosing. Next INR check in 4 days.     Summary  As of 5/31/2024      Full warfarin instructions:  7.5 mg every Mon, Fri; 5 mg all other days   Next INR check:  6/4/2024               Telephone call with Jim who verbalizes understanding and agrees to plan and who agrees to plan and repeated back plan correctly & My chart message      Check at provider office visit    Education provided:   Please call back if any changes to your diet, medications or how you've been taking warfarin  Taking warfarin: Importance of taking warfarin as instructed  Goal range and lab monitoring: goal range and significance of current result, Importance of therapeutic range, Importance of following up at instructed interval, and frequency of lab work when starting warfarin and importance of following up when instructed (extends after stability established)    Plan made with [facility] Pharmacist Marilu Phipps, [facility]  [facility]  5/31/2024    _______________________________________________________________________     Anticoagulation Episode Summary       Current INR goal:  2.0-3.0   TTR:  --   Target end date:  Indefinite   Send INR reminders to:  [facility]    Indications    Transplanted heart (H) [Z94.1]  Pulmonary embolism (H) [I26.99]  Single subsegmental pulmonary embolism without acute cor pulmonale (H) [I26.93]             Comments:               Anticoagulation Care Providers       Provider Role Specialty Phone number    Riaz Montaño MD Referring Cardiovascular Disease [phone]    Rajani Wilder APRN CNP Referring Cardiovascular Disease [phone]

## 2024-05-31 NOTE — PROGRESS NOTES
BMP results reviewed with Kiesha Wilder. Cr = 3.86. Bumex stopped. Not on potassium. Repeat bmp on Monday 6/3. Lab appt made and orders in. Pt to call on call coordinator with any complaints over the weekend.

## 2024-06-03 NOTE — PROGRESS NOTES
Reviewed labs with Dr. Montgomery and Kiesha Wilder. Pt will repeat bmp tomorrow am. If worsening symptoms patient encouraged to present to ER for evaluation.

## 2024-06-04 NOTE — PROGRESS NOTES
Urology Consult History and Physical  [facility]   Name: Jim Willingham    MRN: [id number]   YOB: 1955       We were asked to see Jim Willingham at the request of Dr. Gómez for evaluation and treatment of Elevated PSA .        Chief Complaint:   Elevated PSA     History is obtained from the patient            History of Present Illness:   Jim Willingham is a 68 year old male who is being seen for evaluation of Elevated PSA     HE was noted recently to have elevation of his PSA    No significant urinary changes or issues around that time    He does have a few years history of urinary urgency  No change in urine stream    History of heart transplant on 5/6/2021  Recently hospitalized and found to have a PE on warfarin           Past Medical History:     Past Medical History:   Diagnosis Date    Bariatric surgery status 2003    Benign essential hypertension 05/11/2017    Bilateral carpal tunnel syndrome 11/02/2020    BPH with obstruction/lower urinary tract symptoms 03/21/2024    Cardiomyopathy, unspecified (H) 05/08/2017    CKD (chronic kidney disease) stage 3, GFR 30-59 ml/min (H) 05/11/2017    COPD (chronic obstructive pulmonary disease) (H) 11/02/2020    Depression 05/11/2017    Diabetes mellitus (H) 1995    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    Generalized osteoarthritis 09/26/2023    Gouty arthropathy, chronic, without tophi 11/02/2020    H/O adenomatous polyp of colon 08/03/2016    2 polyps    H/O gastric bypass 05/11/2017    History of type 2 diabetes mellitus 03/28/2023    Hyperlipidemia LDL goal <70 09/27/2022    ICD (implantable cardioverter-defibrillator), biventricular, in situ 05/11/2017    IFG (impaired fasting glucose) 09/26/2023    LVAD (left ventricular assist device) present (H)     Major depression, recurrent, chronic (H24) 11/02/2020    Mild anemia 03/18/2022    NICM (nonischemic cardiomyopathy) (H)/ EF 20% 05/11/2017    ECHO: LVEDd. 7.66 cm, Restrictive pattern , Severe mitral  valve regurgitation    CECILIA (obstructive sleep apnea) 05/11/2017    Paroxysmal atrial fibrillation (H) 05/11/2017    Paroxysmal VT (H) 05/11/2017    Peripheral polyneuropathy 03/28/2023    Postsurgical dumping syndrome 03/21/2024    Pulmonary cavitary lesion 11/18/2021    Rotator cuff tear arthropathy of both shoulders 11/02/2020    Type 2 diabetes mellitus with diabetic polyneuropathy (H) 03/18/2022    Uncomplicated asthma     Vitamin B12 deficiency (non anemic) 05/11/2017            Past Surgical History:     Past Surgical History:   Procedure Laterality Date    ANESTHESIA CARDIOVERSION N/A 05/11/2020    Procedure: ANESTHESIA, FOR CARDIOVERSION @1100;  Surgeon: GENERIC ANESTHESIA PROVIDER;  Location: [facility] OR    BRONCHOSCOPY (RIGID OR FLEXIBLE), DIAGNOSTIC N/A 8/30/2021    Procedure: BRONCHOSCOPY, WITH BRONCHOALVEOLAR LAVAGE;  Surgeon: Perlman, David Morris, MD;  Location: [facility] GI    COLONOSCOPY N/A 4/18/2024    Procedure: COLONOSCOPY, WITH POLYPECTOMY;  Surgeon: Jermaine Root MD;  Location: [facility] GI    CV CORONARY ANGIOGRAM N/A 5/17/2022    Procedure: Coronary Angiogram;  Surgeon: Jeffrey Gibson MD;  Location:  HEART CARDIAC CATH LAB    CV CORONARY ANGIOGRAM N/A 5/23/2023    Procedure: Coronary Angiogram;  Surgeon: Scout Robins MD;  [facility]    CV HEART BIOPSY N/A 5/13/2021    Procedure: Heart Biopsy;  Surgeon: Scout Robins MD;  [facility]    CV HEART BIOPSY N/A 5/20/2021    Procedure: Heart Biopsy;  Surgeon: Jeffrey Gibson MD;  [facility]    CV HEART BIOPSY N/A 5/27/2021    Procedure: Heart Biopsy;  Surgeon: Jac Dover MD;  [facility]    CV HEART BIOPSY N/A 6/7/2021    Procedure: CV HEART BIOPSY;  Surgeon: Jeffrey Gibson MD;  [facility]    CV HEART BIOPSY N/A 6/21/2021    Procedure: CV HEART BIOPSY;  Surgeon:  Scout Robins MD;  Location:  HEART CARDIAC CATH LAB    CV HEART BIOPSY N/A 7/5/2021    Procedure: CV HEART BIOPSY;  Surgeon: Jeffrey Gibson MD;  [facility]    CV HEART BIOPSY N/A 7/16/2021    Procedure: Heart Biopsy;  Surgeon: Amadeo Art MD;  Location:  HEART CARDIAC CATH LAB    CV HEART BIOPSY N/A 8/5/2021    Procedure: Heart Biopsy;  Surgeon: Jeffrey Gibson MD;  Location:  HEART CARDIAC CATH LAB    CV HEART BIOPSY N/A 8/31/2021    Procedure: Heart Cath Heart Biopsy;  Surgeon: Jeffrey Gibson MD;  Location:  HEART CARDIAC CATH LAB    CV HEART BIOPSY N/A 9/21/2021    Procedure: CV HEART BIOPSY;  Surgeon: Jeffrey Gibson MD;  Location:  HEART CARDIAC CATH LAB    CV HEART BIOPSY N/A 10/5/2021    Procedure: CV HEART BIOPSY;  Surgeon: Jeffrey Gibson MD;  Location:  HEART CARDIAC CATH LAB    CV HEART BIOPSY N/A 11/4/2021    Procedure: CV HEART BIOPSY;  Surgeon: Nicola Seth MD;  Location:  HEART CARDIAC CATH LAB    CV HEART BIOPSY N/A 5/17/2022    Procedure: Heart Biopsy;  Surgeon: Jeffrey Gibson MD;  Location:  HEART CARDIAC CATH LAB    CV HEART BIOPSY N/A 5/23/2023    Procedure: Heart Biopsy;  Surgeon: Scout Robins MD;  Location:  HEART CARDIAC CATH LAB    CV HEART BIOPSY N/A 5/23/2024    Procedure: Heart Biopsy;  Surgeon: Precious Bautista MD;  Location:  HEART CARDIAC CATH LAB    CV RIGHT HEART CATH MEASUREMENTS RECORDED N/A 07/24/2019    Procedure: CV RIGHT HEART CATH;  Surgeon: Renu Sears MD;  Location:  HEART CARDIAC CATH LAB    CV RIGHT HEART CATH MEASUREMENTS RECORDED N/A 08/05/2020    Procedure: CV RIGHT HEART CATH;  Surgeon: Nicola Seth MD;  [facility]    CV RIGHT HEART CATH MEASUREMENTS RECORDED N/A 01/07/2021    Procedure: CV RIGHT HEART CATH;  Surgeon: Jac Dover MD;  Location: [facility] CARDIAC  CATH LAB    CV RIGHT HEART CATH MEASUREMENTS RECORDED N/A 02/23/2021    Procedure: Heart Cath Right Heart Cath;  Surgeon: Jeffrey Gibson MD;  [facility] CATH LAB    CV RIGHT HEART CATH MEASUREMENTS RECORDED N/A 03/23/2021    Procedure: Heart Cath Right Heart Cath. request for 3/23;  Surgeon: Jeffrey Gibson MD;  Location:  HEART CARDIAC CATH LAB    CV RIGHT HEART CATH MEASUREMENTS RECORDED N/A 5/13/2021    Procedure: Right Heart Cath;  Surgeon: Scout Robins MD;  Location:  HEART CARDIAC CATH LAB    CV RIGHT HEART CATH MEASUREMENTS RECORDED N/A 5/20/2021    Procedure: Right Heart Cath;  Surgeon: Jeffrey Gibson MD;  Location:  HEART CARDIAC CATH LAB    CV RIGHT HEART CATH MEASUREMENTS RECORDED N/A 5/27/2021    Procedure: Right Heart Cath;  Surgeon: Jac Dover MD;  Location:  HEART CARDIAC CATH LAB    CV RIGHT HEART CATH MEASUREMENTS RECORDED N/A 6/7/2021    Procedure: CV RIGHT HEART CATH;  Surgeon: Jeffrey Gibson MD;  Location:  HEART CARDIAC CATH LAB    CV RIGHT HEART CATH MEASUREMENTS RECORDED N/A 6/21/2021    Procedure: CV RIGHT HEART CATH;  Surgeon: Scout Robins MD;  Location:  HEART CARDIAC CATH LAB    CV RIGHT HEART CATH MEASUREMENTS RECORDED N/A 7/5/2021    Procedure: CV RIGHT HEART CATH;  Surgeon: Jeffrey Gibson MD;  Location:  HEART CARDIAC CATH LAB    CV RIGHT HEART CATH MEASUREMENTS RECORDED N/A 7/16/2021    Procedure: Right Heart Cath;  Surgeon: Amadeo Art MD;  Location:  HEART CARDIAC CATH LAB    CV RIGHT HEART CATH MEASUREMENTS RECORDED N/A 8/5/2021    Procedure: CV RIGHT HEART CATH;  Surgeon: Jeffrey Gibson MD;  Location:  HEART CARDIAC CATH LAB    CV RIGHT HEART CATH MEASUREMENTS RECORDED N/A 8/31/2021    Procedure: Heart Cath Right Heart Cath;  Surgeon: Jeffrey Gibson MD;  Location:  HEART CARDIAC CATH LAB    CV RIGHT  HEART CATH MEASUREMENTS RECORDED N/A 9/21/2021    Procedure: CV RIGHT HEART CATH;  Surgeon: Jeffrey Gibson MD;  Location:  HEART CARDIAC CATH LAB    CV RIGHT HEART CATH MEASUREMENTS RECORDED N/A 10/5/2021    Procedure: CV RIGHT HEART CATH;  Surgeon: Jeffrey Gibson MD;  Location:  HEART CARDIAC CATH LAB    CV RIGHT HEART CATH MEASUREMENTS RECORDED N/A 11/4/2021    Procedure: CV RIGHT HEART CATH;  Surgeon: Nicola Seth MD;  Location:  HEART CARDIAC CATH LAB    CV RIGHT HEART CATH MEASUREMENTS RECORDED N/A 5/17/2022    Procedure: Right Heart Catheterization;  Surgeon: Jeffrey Gibson MD;  Location:  HEART CARDIAC CATH LAB    CV RIGHT HEART CATH MEASUREMENTS RECORDED N/A 5/23/2023    Procedure: Right Heart Catheterization;  Surgeon: Scout Robins MD;  Location:  HEART CARDIAC CATH LAB    CV RIGHT HEART CATH MEASUREMENTS RECORDED N/A 5/23/2024    Procedure: Right Heart Cath- pre noon with rush path;  Surgeon: Precious Bautista MD;  Location:  HEART CARDIAC CATH LAB    GI SURGERY  2003    [facility]    INSERT VENTRICULAR ASSIST DEVICE LEFT (HEARTMATE II) N/A 06/19/2017    Procedure: INSERT VENTRICULAR ASSIST DEVICE LEFT (HEARTMATE II);  Median Sternotomy Heartmate II Left Ventricular Assist Device Insertion on Pump Oxygenator;  Surgeon: Ronnie Quigley MD;  Location:  OR    IR CHEST TUBE PLACEMENT NON-TUNNELED RIGHT  7/11/2021    LAPAROSCOPY DIAGNOSTIC (GENERAL) N/A 1/4/2024    Procedure: Diagnostic Laparoscopy, Exploratory Laparotomy with Extensive lysis of adhesions.;  Surgeon: Frederick Montgomery MD;  Location:  OR    ORTHOPEDIC SURGERY  1994    right knee wired    PICC DOUBLE LUMEN PLACEMENT Right 09/23/2020    5FR PICC DL. Length-43cm (1cm out).    PICC INSERTION - DOUBLE LUMEN Right 05/09/2021    IK/BRACH    RELEASE CARPAL TUNNEL BILATERAL Bilateral 02/18/2021    Procedure: Bilateral carpal tunnel release;  Surgeon: Jermaine Brand MD;  Location:   OR    TRANSPLANT HEART RECIPIENT N/A 2021    Procedure: Redo median sternotomy, lysis of adhesions, heart transplant recipient, on cardiopulmonary bypass, intraoperative transesophageal echocardiogram per anesthesia, Implantable Cardioverter Defibrillator (ICD) removal;  Surgeon: Ronnie Quigley MD;  Location:  OR            Social History:     Social History     Tobacco Use    Smoking status: Former     Current packs/day: 0.00     Types: Cigarettes     Quit date:      Years since quittin.4     Passive exposure: Never    Smokeless tobacco: Never   Substance Use Topics    Alcohol use: No       History   Smoking Status    Former    Types: Cigarettes    Quit date:    Smokeless Tobacco    Never            Family History:     Family History   Problem Relation Age of Onset    Cerebrovascular Disease Mother 64    Diabetes Mother     Hypertension Mother     Coronary Artery Disease Father     Diabetes Type 2  Father     Obesity Brother     Obesity Brother     Cerebrovascular Disease Daughter 40              Allergies:     Allergies   Allergen Reactions    Grass Shortness Of Breath    Ace Inhibitors Cough    Cats     Dust Mites Other (See Comments)     Asthma    Mold Other (See Comments)     Asthma    Penicillins Other (See Comments)     Unknown - childhood exposure    Tolerated Zosyn -2020    Per patient report he has tolerated amoxicillin    Sulfa Antibiotics Other (See Comments) and Unknown     Unknown childhood reaction            Medications:     Current Outpatient Medications   Medication Sig Dispense Refill    acetaminophen (TYLENOL) 325 MG tablet Take 3 tablets (975 mg) by mouth every 8 hours as needed for mild pain 100 tablet 0    albuterol (PROAIR HFA/PROVENTIL HFA/VENTOLIN HFA) 108 (90 Base) MCG/ACT inhaler Inhale 2 puffs into the lungs every 4 hours as needed for shortness of breath, wheezing or cough 40.2 g 2    allopurinol (ZYLOPRIM) 300 MG tablet TAKE 1 TABLET BY MOUTH DAILY 100  tablet 3    buPROPion (WELLBUTRIN XL) 300 MG 24 hr tablet Take 1 tablet (300 mg) by mouth every morning 90 tablet 1    calcium carbonate-vitamin D (OSCAL) 500-5 MG-MCG tablet Take 1 tablet by mouth daily 60 tablet 3    cyanocobalamin (VITAMIN B-12) 1000 MCG tablet Take 1,000 mcg by mouth daily      diphenhydrAMINE (BENADRYL) 25 MG tablet Take 25 mg by mouth every 6 hours as needed for itching      gabapentin (NEURONTIN) 300 MG capsule Take 300 mg by mouth daily      ipratropium - albuterol 0.5 mg/2.5 mg/3 mL (DUONEB) 0.5-2.5 (3) MG/3ML neb solution Take 1 vial by nebulization 4 times daily as needed for shortness of breath, wheezing or cough      Melatonin 10 MG CAPS Take 1 capsule by mouth nightly as needed      montelukast (SINGULAIR) 10 MG tablet TAKE 1 TABLET BY MOUTH AT  BEDTIME 100 tablet 3    mycophenolate (GENERIC EQUIVALENT) 250 MG capsule Take 2 capsules (500 mg) by mouth 2 times daily 180 capsule 3    polyethylene glycol (MIRALAX) 17 GM/Dose powder Take 1 Capful by mouth daily as needed for constipation      rosuvastatin (CRESTOR) 10 MG tablet Take 1 tablet (10 mg) by mouth daily 90 tablet 0    tacrolimus (GENERIC EQUIVALENT) 0.5 MG capsule Take 1 capsule (0.5 mg) by mouth every morning With 3 mg capsules 90 capsule 3    tacrolimus (GENERIC EQUIVALENT) 1 MG capsule TAKE 3 CAPSULES BY MOUTh IN am and in pm. 360 capsule 11    traMADol (ULTRAM) 50 MG tablet TAKE ONE TABLET BY MOUTH EVERY MORNING AND AFTERNOON AND 2 AT BEDTIME AS NEEDED FOR PAIN      traMADol (ULTRAM) 50 MG tablet Take 50 mg by mouth every 6 hours as needed for severe pain      VITAMIN D PO Take 1 capsule by mouth daily      warfarin ANTICOAGULANT (COUMADIN) 2.5 MG tablet Take 2.5 mg daily or as directed by anticoagulation clinic 30 tablet 3     No current facility-administered medications for this visit.             Review of Systems:     Reviewed and noted above          Physical Exam:   No data found.  There is no height or weight on file  to calculate BMI.     General: age-appropriate appearing male in NAD  HEENT: Head AT/NC, EOMI, CN Grossly intact  Lungs: no respiratory distress, or pursed lip breathing  Heart: No obvious jugular venous distension present  Abdomen: obesely-distended  Musculoskeltal: extremities normal, no peripheral edema  Neuro: Alert, oriented, speech and mentation normal;  moving all 4 extremities equally.  Psych: affect and mood normal          Data:   All laboratory data reviewed:    UA RESULTS:  Recent Labs   Lab Test 05/21/24  1306 05/20/24  1351 03/21/24  1007   COLOR Light Yellow   < > Yellow   APPEARANCE Clear   < > Clear   URINEGLC Negative   < > Negative   URINEBILI Negative   < > Negative   URINEKETONE Trace*   < > Negative   SG 1.015   < > 1.015   UBLD Negative   < > Trace*   URINEPH 5.5   < > 5.5   PROTEIN Negative   < > Trace*   UROBILINOGEN  --   --  0.2   NITRITE Negative   < > Negative   LEUKEST Negative   < > Trace*   RBCU <1   < > 0-2   WBCU <1   < > 5-10*    < > = values in this interval not displayed.       PSA PSA Tumor Marker   Latest Ref Rng 0.00 - 4.00 ug/L 0.00 - 4.50 ng/mL   5/25/2017 0.19     6/8/2017 0.24     9/10/2020 0.35     5/17/2022 0.50     5/23/2023  0.36    3/21/2024  5.86 (H)      Creatinine   Date Value Ref Range Status   06/03/2024 3.82 (H) 0.67 - 1.17 mg/dL Final   05/31/2024 3.86 (H) 0.67 - 1.17 mg/dL Final   05/29/2024 3.05 (H) 0.67 - 1.17 mg/dL Final   05/28/2024 2.97 (H) 0.67 - 1.17 mg/dL Final   05/27/2024 3.07 (H) 0.67 - 1.17 mg/dL Final   05/26/2024 2.74 (H) 0.67 - 1.17 mg/dL Final   05/25/2024 2.84 (H) 0.67 - 1.17 mg/dL Final   05/25/2024 2.85 (H) 0.67 - 1.17 mg/dL Final   05/24/2024 3.05 (H) 0.67 - 1.17 mg/dL Final   05/24/2024 2.92 (H) 0.67 - 1.17 mg/dL Final       IMAGING:  All pertinent imaging reviewed:    All imaging studies reviewed by me.  I personally reviewed these imaging films.  A formal report from radiology will follow.    CT CHEST/ABD/PEL 5/22/2024:  Findings:       CHEST:      Lungs: Similar appearance of bibasilar reticular scarring along the  right lateral and posterior costophrenic angles. Small amount of  lingular atelectasis. Mildly irregular curvilinear density in the  medial left lower (4/130). Scattered sub-6 mm pulmonary nodules such  as a 3 mm nodule in the left upper lobe (series 4 image 58) are not  significantly changed from 12/15/2021. Trace left-sided pleural  effusion. No pneumothorax.  Airways: Central tracheobronchial tree is clear.  Vessels: Main pulmonary artery and aorta are normal in caliber. Common  origin of the left common carotid artery and right brachiocephalic  trunk. Aortic atherosclerosis. Abandoned left subclavian pacer lead.  Heart: Heart size is normal with trace pericardial fluid..  Lymph nodes: Limited evaluation of the hilum due to noncontrast  technique. Prominent pretracheal lymph node measuring 1.3 cm in short  axis.  Thyroid: Within normal limits.  Esophagus: Decompressed with questionable distal thickening.     ABDOMEN PELVIS:     Limited evaluation of abdominal parenchymal organs due to noncontrast  technique. Mild motion related artifact limits the study.     Liver: No focal liver lesion  Biliary system: Cholelithiasis without signs of acute cholecystitis.  No intrahepatic or extrahepatic biliary ductal dilatation.  Pancreas: Normal noncontrast appearance of the pancreas.  Stomach: Postoperative changes of gastric bypass. Nondilated  appearance of the hepatobiliary limb..  Spleen: Within normal limits.  Adrenal glands: Mild nodular thickening of the glands.  Kidneys: No focal mass, hydronephrosis, or stone.  Bladder: Within normal limits.  Reproductive organs: Mild prostatomegaly  Gastrointestinal: No definite high-grade bowel obstruction. No  pneumatosis.  Lymph nodes: No intra-abdominal or pelvic lymphadenopathy.  Vasculature: Aortobiiliac atherosclerosis, normal caliber of the  abdominal aorta.  Peritoneum: Trace perihepatic  ascites. No intraperitoneal free air. ML  applicable.     Soft tissues: Circumscribed appearing hypodensity anterior to the  right shoulder joint (5/16), may represent joint related effusion or  collection. Similar calcification in the right gluteal region  subcutaneous soft tissue. Bilateral gynecomastia.  Bones: No suspicious osseous lesion. similar chronic-appearing  fracture deformity of the left lateral 10th rib at the costochondral  junction.                                                                      IMPRESSION:       1. No overt mass within the chest, abdomen or pelvis on noncontrast  exam   2. Few sub-6 mm pulmonary nodules, similar to prior; irregular shaped  curvilinear density in the medial left lung, indeterminate, possibly  atelectatic. Recommend attention on follow-up.  3. Similar appearance of right basilar reticular scarring of the  pulmonary parenchyma likely related to prior infection.  4. Cholelithiasis without signs of acute cholecystitis.  5. Trace left pleural effusion.  6. Relatively circumscribed hypodensity adjacent and anterior to the  right shoulder may represent joint effusion or collection, consider  correlation and MRI shoulder with contrast if clinically indicated.  7. Mild apparent distal esophageal thickening near the  gastroesophageal junction, possibly secondary to underdistention, or  inflammatory etiology, consider attention on follow-up and/or  endoscopy for further evaluation as clinically desired.  7. Additional incidental findings as above including mild  prostatomegaly.         Impression and Plan:   Impression:   68-year-old man with complex medical history status post a heart transplant in May 2021 with recent evidence of a PE as well as CKD and also found to have an elevated PSA      Plan:   Elevated PSA  - We discussed the use of PSA as a screening test for prostate cancer  - I reviewed his PSA history and trend.  His PSA had been extremely low up until this  year with a spike to 5.86  - Will plan for a PSA recheck today and order for this placed  - I will send him the results on MyChart  - If his PSA returns a similar result we discussed that ideally we would proceed with a prostate MRI with and without contrast.  He does have significant CKD and so an MRI with contrast is likely not going to be feasible  - We discussed the possibility of needing to proceed with a prostate biopsy, however will await the results of the PSA recheck  - This is an undiagnosed problem with an uncertain prognosis     Thank you for the kind consultation.    Time spent: 20 minutes spent on the date of the encounter doing chart review, history and exam, documentation and further activities as noted above.    Dk Cain MD   Urology  [facility]  [facility] Phone: [phone]  [facility] Phone: [phone]

## 2024-06-04 NOTE — PROGRESS NOTES
ANTICOAGULATION MANAGEMENT     Jim Willingham 68 year old male is on warfarin with supratherapeutic INR result. (Goal INR 2.0-3.0)    Recent labs: (last 7 days)     06/04/24  1250   INR 3.97*       ASSESSMENT     Source(s): Chart Review and Patient/Caregiver Call     Warfarin doses taken: Warfarin taken as instructed  Diet: No new diet changes identified  Medication/supplement changes: None noted  New illness, injury, or hospitalization: Yes: recent PE  Signs or symptoms of bleeding or clotting: No  Previous result: Therapeutic last visit; previously outside of goal range  Additional findings:     Previous encounter: Patient previously on Warfarin - restarted due to PE (taking between 7.5-10mg daily). New start day 9. Discharged from hospital 5/29 on smaller dose. 7 day average of warfarin used to establish maintenance dose.  Increase your warfarin dose to reflect 7 day average dosing. Next INR check in 4 days.     [facility] consult today: 1.25 mg tomorrow, then 3.75 mg Thur; 5 ROW      PLAN     Recommended plan for temporary change(s) and ongoing change(s) affecting INR     Dosing Instructions: partial hold then decrease your warfarin dose (15.6% change) with next INR in 3 days       Summary  As of 6/4/2024      Full warfarin instructions:  6/4: 5 mg; 6/5: 1.25 mg; Otherwise 3.75 mg every Thu; 5 mg all other days   Next INR check:  6/7/2024               Telephone call with Jim who agrees to plan and repeated back plan correctly    Lab visit scheduled    Education provided:   Goal range and lab monitoring: goal range and significance of current result and Importance of following up at instructed interval  Symptom monitoring: monitoring for bleeding signs and symptoms and when to seek medical attention/emergency care  Contact [phone] with any changes, questions or concerns.     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

## 2024-06-04 NOTE — NURSING NOTE
Jim Willingham's goals for this visit include:   Chief Complaint   Patient presents with    Consult     Elevated prostate specific antigen (PSA) 5.86 3/21; med rec in epic, apt per pt, ref by Kayy Brooks APRN CNP       He requests these members of his care team be copied on today's visit information:     PCP: Ricardo Gómez    Referring Provider:  TIM Reina CNP  [address] [place] [address]    There were no vitals taken for this visit.    Do you need any medication refills at today's visit?     Amber Hopkins MA on 6/4/2024 at 12:30 PM

## 2024-06-05 NOTE — TELEPHONE ENCOUNTER
Despite labs improving on 6/4, pt having increase in sob and weight gain of +14 lbs since discharging on 5/29. After reviewing with Dr. Montgomery, pt to take bumex 2 mg daily until 6/7. Repeat labs then we will discuss plan.     Pt also scheduled with general nephrology for 6/21. Message to team to see if we could get him in with transplant nephrology instead.     Pt made aware of the above and agrees with the plan. Pt to present to ER with worsening sob and or symptoms.

## 2024-06-05 NOTE — TELEPHONE ENCOUNTER
197 lbs on discharge    211 lbs now.     Was on Bumex 1 mg daily upon discharge. Stopped on 5/31.

## 2024-06-05 NOTE — TELEPHONE ENCOUNTER
Patient Call: General  Route to LPN    Reason for call: Patient wanting to update Coordinator, that he has put on more water Weight and is having some SOB    Call back needed? Yes    Return Call Needed  Same as documented in contacts section  When to return call?: Same day: Route High Priority

## 2024-06-07 NOTE — PROGRESS NOTES
ANTICOAGULATION MANAGEMENT     Jimoswald Willingham 69 year old male is on warfarin with supratherapeutic INR result. (Goal INR 2.0-3.0)    Recent labs: (last 7 days)     06/07/24  0848   INR 3.36*       ASSESSMENT     Source(s): Chart Review and Patient/Caregiver Call     Warfarin doses taken: More warfarin taken than planned which may be affecting INR  Diet: No new diet changes identified  Medication/supplement changes: None noted  New illness, injury, or hospitalization: No  Signs or symptoms of bleeding or clotting: No  Previous result: Supratherapeutic  Additional findings: None and New start 5/23/24     PLAN     Recommended plan for temporary change(s) affecting INR     Dosing Instructions: decrease your warfarin dose (~7% change from last 4 day average) with next INR in 5 days       Summary  As of 6/7/2024      Full warfarin instructions:  3.75 mg every Mon, Fri; 5 mg all other days   Next INR check:  6/12/2024               Telephone call with Jim who verbalizes understanding and agrees to plan  Sent [employer] message with dosing and follow up instructions    Lab visit scheduled    Education provided:   Taking warfarin: Importance of taking warfarin as instructed  Goal range and lab monitoring: goal range and significance of current result and Importance of following up at instructed interval    Plan made per [facility] anticoagulation protocol    Mihaela Frank RN  Anticoagulation Clinic  6/7/2024    _______________________________________________________________________     Anticoagulation Episode Summary       Current INR goal:  2.0-3.0   TTR:  0.0% (4 d)   Target end date:  Indefinite   Send INR reminders to:  [facility]    Indications    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

## 2024-06-07 NOTE — TELEPHONE ENCOUNTER
Bmp results from 6/7 reviewed with Dr. Montgomery.     Continue bumex 2 mg daily. Repeat labs Monday.

## 2024-06-11 NOTE — TELEPHONE ENCOUNTER
Jim's weight is 204 (was 211 lbs last Friday). Pt states sob has improved but he still feels some fluid. Cr = 3.1 (was 2.8 last Friday). Potassium = 4.6. GFR = 21. Pt sees nephrology with labs tomorrow 6/12.     Still taking bumex 2 mg daily.     Message sent to Dr. Montgomery.

## 2024-06-11 NOTE — PROGRESS NOTES
ANTICOAGULATION MANAGEMENT     Jim Willingham 69 year old male is on warfarin with supratherapeutic INR result. (Goal INR 2.0-3.0)    Recent labs: (last 7 days)     06/10/24  1407   INR 3.54*       ASSESSMENT     Source(s): Chart Review and Patient/Caregiver Call     Warfarin doses taken: Warfarin taken as instructed  Diet: No new diet changes identified  Medication/supplement changes: None noted  New illness, injury, or hospitalization: No  Signs or symptoms of bleeding or clotting: No  Previous result: Supratherapeutic  Additional findings:     [facility] consult: lowered him to 27.5 mg/week. this is a 8.5% decrease from his last 7 days, recheck in 1 week     Jim has already taken his scheduled dose of warfarin today, 5 mg. He will take a partial dose tomorrow instead (tracking calendar updated).    PLAN     Recommended plan for ongoing change(s) affecting INR     Dosing Instructions: partial hold then decrease your warfarin dose (8.5% change) with next INR in 1 week       Summary  As of 6/11/2024      Full warfarin instructions:  6/11: 5 mg; 6/12: 2.5 mg; Otherwise 5 mg every Sun; 3.75 mg all other days   Next INR check:  6/17/2024               Telephone call with Jim who agrees to plan and repeated back plan correctly  Sent [employer] message with dosing and follow up instructions    Lab visit scheduled    Education provided:   Taking warfarin: take warfarin at same time each day; preferably in the evening  Goal range and lab monitoring: goal range and significance of current result and Importance of following up at instructed interval  Symptom monitoring: monitoring for bleeding signs and symptoms and when to seek medical attention/emergency care  Contact [phone] with any changes, questions or concerns.     Plan made per [facility] anticoagulation protocol    TARYN MAN, REGINALD  Anticoagulation Clinic  6/11/2024    _______________________________________________________________________     Anticoagulation Episode  Summary       Current INR goal:  2.0-3.0   TTR:  0.0% (1 wk)   Target end date:  Indefinite   Send INR reminders to:  [facility]    Indications    Transplanted heart (H) [Z94.1]  Pulmonary embolism (H) [I26.99]  Single subsegmental pulmonary embolism without acute cor pulmonale (H) [I26.93]             Comments:               Anticoagulation Care Providers       Provider Role Specialty Phone number    Riaz Montaño MD Referring Cardiovascular Disease [phone]    Rajani Wilder, APRN CNP Referring Cardiovascular Disease [phone]

## 2024-06-12 NOTE — TELEPHONE ENCOUNTER
Called patient to review results LVM for patient to call back.     Bharti Mackay LPN on 6/12/2024 at 3:42 PM

## 2024-06-12 NOTE — PATIENT INSTRUCTIONS
Discontinue Ca/D tablet  Begin Tums 1000 mg with each meal until further directed  Continue Bumex 2 mg daily

## 2024-06-12 NOTE — NURSING NOTE
Chief Complaint   Patient presents with    [facility] follow up.      Vitals:    06/12/24 1416 06/12/24 1417 06/12/24 1419 06/12/24 1420   BP: 115/79 117/80 114/77 115/79   BP Location: Right arm Right arm Right arm    Patient Position: Sitting Sitting Sitting    Cuff Size: Adult Regular Adult Regular Adult Regular    Pulse: 103      SpO2: 99%      Weight: 94.6 kg (208 lb 8 oz)          BP Readings from Last 3 Encounters:   06/12/24 115/79   05/29/24 117/85   04/18/24 (!) 150/102       /79   Pulse 103   Wt 94.6 kg (208 lb 8 oz)   SpO2 99%   BMI 31.70 kg/m       Nazanin Arvizu

## 2024-06-12 NOTE — TELEPHONE ENCOUNTER
Braeden Fernandez,     Here is the result of your PSA recheck from last week.  This is very reassuring and has come down to 0.51.  Your PSA from a few months ago which was elevated, was likely a abnormal reading and does not indicate any further need for workup.     Plan  -Recommend continued annual PSA monitoring through your PCP     Thanks,  Dk Cain MD    Received message above from Dr. Cain. Called patient. Patient was currently driving and requested staff to call back later.     Bharti Mackay LPN on 6/12/2024 at 12:57 PM

## 2024-06-12 NOTE — LETTER
6/12/2024       RE: Jim Willingham  [address]  [place] [address]     Dear Colleague,    Thank you for referring your patient, Jim Willingham, to the [facility] [place] at [facility]. Please see a copy of my visit note below.    Nephrology Clinic Visit 6/12/24    Assessment and Plan:    WALTER? - Creat 3.8, eGFR 17 ml/mn.    - UA unremarkable on 5/31/24   - Etiology for his WALTER was undetermined.    - Renal US 5/24 neg for hydro/masses   - DsDNA, Anca, Complements, KLR and immunofixation normal   - He does have elevated serum oxalate level of 8.6 but that may just be elevated given the WALTER   - Does have elevated PSA but again, no hydro on US   - No NSAIDs, Abx,    - He appears to be euvolemic so this may be his actual level of renal function at this time   - Jim is familiar with dialysis and asked questions about RRT and transplant. If renal function does not improve then will proceed with transplant referral    2. 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 Heart transplant 2021 - IS regimen: TAC/MMF. TAC level 5.3. Weight 204# per patient report this morning. No edema/minimal dyspnea/b/ps teens/  Current regimen: Bumex 2 mg every day   - Will continue for now   - Per Cardiology    4. Pulmonary embolism dx'd 5/24- On Warfarin. Likely some of the etiology for his dyspnea which is improving. On Warfarin    5. Electrolytes - No acute concerns. K 4.8 Na 140    6. Acid base - No acute concerns. Bicarb 23    7. Elevated oxalate level in setting of WALTER and h/o RGBY ( 2003) - Serum oxalate level 8.6.    - Begin Tums 1000 mg TID w/meals   - Repeat oxalate level in 3 months    8. Disposition - RTC 9/9/24 for follow up with labs qowk. Will message Dr Montgomery    Assessment and plan was discussed with patient and he voiced his understanding and agreement.    Reason for  Visit:  WALTER/CKD    HPI:  Mr Willingham is a 70 yo male with PMH significant for Heart transplant 2021, CKD, MICHAEL, DM2, RGBY 2003 with recent hospital admission 5/20-5/29/24 for hypervolemia, PE started on Warfarin and WALTER. Hospital discharge weight was 197#. Peak creat 3.8 on 6/3/24. Patient then redeveloped edema following hospital discharge when diuretics were discontinued. Weight luis to 211 # and was restarted on Bumex 2 mg every day on 6/7/24. Home weight today was 204#. He is breathing much better, edema has resolved, abdomen not bloated and home b/ps teens - 120/.   Serum oxalate level 8.6    Baseline creat 1.4-1.8 prior to hospital admission    ROS:   Feeling well today  Breathing improved  Edema resolved  No CP  No abdominal bloating  No voiding concerns  Appetite is good  Energy level is slowly improving. 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, NS, ETOH rare    Allergies:  Allergies   Allergen Reactions     Grass Shortness Of Breath     Ace Inhibitors Cough     Cats      Dust Mites Other (See Comments)     Asthma     Mold Other (See Comments)     Asthma     Penicillins Other (See Comments)     Unknown - childhood exposure    Tolerated Zosyn 9/18-9/20/2020    Per patient report he has tolerated amoxicillin     Sulfa Antibiotics Other (See Comments) and Unknown     Unknown childhood reaction       Medications:  Current Outpatient Medications   Medication Sig Dispense Refill     acetaminophen (TYLENOL) 325 MG tablet  Take 3 tablets (975 mg) by mouth every 8 hours as needed for mild pain 100 tablet 0     albuterol (PROAIR HFA/PROVENTIL HFA/VENTOLIN HFA) 108 (90 Base) MCG/ACT inhaler Inhale 2 puffs into the lungs every 4 hours as needed for shortness of breath, wheezing or cough 40.2 g 2     allopurinol (ZYLOPRIM) 300 MG tablet TAKE 1 TABLET BY MOUTH DAILY 100 tablet 3     bumetanide (BUMEX) 1 MG tablet Take 2 tablets (2 mg) by mouth daily 180 tablet 3     buPROPion (WELLBUTRIN XL) 300 MG 24 hr tablet Take 1 tablet (300 mg) by mouth every morning 90 tablet 1     calcium carbonate (TUMS) 500 MG chewable tablet Take 2 tablets (1,000 mg) by mouth 3 times daily (with meals) 300 tablet 3     cyanocobalamin (VITAMIN B-12) 1000 MCG tablet Take 1 tablet (1,000 mcg) by mouth daily 90 tablet 3     diphenhydrAMINE (BENADRYL) 25 MG tablet Take 25 mg by mouth every 6 hours as needed for itching       gabapentin (NEURONTIN) 300 MG capsule Take 300 mg by mouth daily       ipratropium - albuterol 0.5 mg/2.5 mg/3 mL (DUONEB) 0.5-2.5 (3) MG/3ML neb solution Take 1 vial by nebulization 4 times daily as needed for shortness of breath, wheezing or cough       Melatonin 10 MG CAPS Take 1 capsule by mouth nightly as needed       montelukast (SINGULAIR) 10 MG tablet TAKE 1 TABLET BY MOUTH AT  BEDTIME 100 tablet 3     mycophenolate (GENERIC EQUIVALENT) 250 MG capsule Take 2 capsules (500 mg) by mouth 2 times daily 180 capsule 3     polyethylene glycol (MIRALAX) 17 GM/Dose powder Take 1 Capful by mouth daily as needed for constipation       rosuvastatin (CRESTOR) 10 MG tablet Take 1 tablet (10 mg) by mouth daily 90 tablet 0     tacrolimus (GENERIC EQUIVALENT) 0.5 MG capsule Take 1 capsule (0.5 mg) by mouth every morning With 3 mg capsules 90 capsule 3     tacrolimus (GENERIC EQUIVALENT) 1 MG capsule TAKE 3 CAPSULES BY MOUTh IN am and in pm.       traMADol (ULTRAM) 50 MG tablet TAKE ONE TABLET BY MOUTH EVERY MORNING AND AFTERNOON AND 2 AT BEDTIME AS NEEDED  FOR PAIN       VITAMIN D PO Take 1 capsule by mouth daily       warfarin ANTICOAGULANT (COUMADIN) 2.5 MG tablet Take 2.5 mg daily or as directed by anticoagulation clinic (Patient taking differently: Take 2.5 mg by mouth daily Take 2.5 mg daily or as directed by anticoagulation clinic) 30 tablet 3     No current facility-administered medications for this visit.      Vitals:  /79   Pulse 103   Wt 94.6 kg (208 lb 8 oz)   SpO2 99%   BMI 31.70 kg/m      Exam:  GENERAL APPEARANCE: Pleasant male in NAD   RESP: lungs clear to auscultation  CV: tachy  EDEMA: no LE edema bilaterally  ABDOMEN: soft, nondistended, nontender  MS: extremities normal - no gross deformities noted  SKIN: no visible rash  Neuro: A/O    LABS:   CMP  Recent Labs   Lab Test 06/12/24  1330 06/10/24  1608 06/07/24  0848 06/04/24  1250 07/11/21  1643 07/11/21  0710 07/10/21  2031 07/10/21  1741 07/10/21  0624 07/09/21  0632    134* 136 137   < > 132*  --  132* 135 136   POTASSIUM 4.8 4.6 5.1 5.2   < > 5.1   < > 5.6* 4.6 4.8   CHLORIDE 106 101 105 105   < > 104  --  103 105 108   CO2 23 21* 19* 21*   < > 23  --  24 23 23   ANIONGAP 11 12 12 11   < > 6  --  5 6 5   * 121* 100* 121*   < > 125*  --  258* 77 122*   BUN 66.9* 64.6* 68.4* 71.2*   < > 49*  --  44* 46* 41*   CR 3.80* 3.19* 2.81* 3.30*   < > 2.75*  --  2.48* 2.40* 2.33*   GFRESTIMATED 17* 21* 24* 20*   < > 23*  --  26* 27* 28*   GFRESTBLACK  --   --   --   --   --  27*  --  31* 32* 33*   QAMAR 8.3* 8.7* 8.8 8.4*   < > 7.9*  --  8.1* 7.8* 7.5*    < > = values in this interval not displayed.     Recent Labs   Lab Test 05/21/24  0535 05/20/24  1403 01/09/24  0433 01/08/24  0458   BILITOTAL 0.5 0.6 0.5 0.6   ALKPHOS 110 132 80 95   ALT 38 49 12 12   AST 34 48* 15 16     CBC  Recent Labs   Lab Test 05/29/24  0600 05/28/24  0607 05/27/24  0526 05/26/24  0556   HGB 10.4* 10.5* 9.9* 9.9*   WBC 5.4 5.6 6.0 6.0   RBC 3.46* 3.59* 3.38* 3.42*   HCT 32.5* 33.8* 32.0* 32.3*   MCV 94 94 95 94    MCH 30.1 29.2 29.3 28.9   MCHC 32.0 31.1* 30.9* 30.7*   RDW 14.4 14.0 13.9 13.8    215 188 206     URINE STUDIES  Recent Labs   Lab Test 05/21/24  1306 05/20/24  1351 03/21/24  1007 01/06/24  1351   COLOR Light Yellow Straw Yellow Light Yellow   APPEARANCE Clear Clear Clear Clear   URINEGLC Negative Negative Negative Negative   URINEBILI Negative Negative Negative Negative   URINEKETONE Trace* Negative Negative Negative   SG 1.015 1.007 1.015 1.017   UBLD Negative Negative Trace* Moderate*   URINEPH 5.5 6.5 5.5 5.5   PROTEIN Negative Negative Trace* 50*   UROBILINOGEN  --   --  0.2  --    NITRITE Negative Negative Negative Negative   LEUKEST Negative Negative Trace* Negative   RBCU <1 0 0-2 1   WBCU <1 <1 5-10* 1     No lab results found.  PTH  Recent Labs   Lab Test 03/28/23  1129   PTHI 529*     IRON STUDIES  Recent Labs   Lab Test 05/23/24  0511 08/05/21  0931 05/13/21  0534   IRON 16* 30* 38    240 215*   IRONSAT 5* 13* 18   VITA 24* 98 98       Gisela English, BARRIE

## 2024-06-13 NOTE — TELEPHONE ENCOUNTER
Patient reviewed [employer] message regarding PSA results  6/11/24.  Closing encounter.    Pooja Kaufman RN on 6/13/2024 at 4:35 PM

## 2024-06-14 NOTE — PROGRESS NOTES
Nephrology Clinic Visit 6/12/24    Assessment and Plan:    WALTER? - Creat 3.8, eGFR 17 ml/mn.    - UA unremarkable on 5/31/24   - Etiology for his WALTER was undetermined.    - Renal US 5/24 neg for hydro/masses   - DsDNA, Anca, Complements, KLR and immunofixation normal   - He does have elevated serum oxalate level of 8.6 but that may just be elevated given the WALTER   - Does have elevated PSA but again, no hydro on US   - No NSAIDs, Abx,    - He appears to be euvolemic so this may be his actual level of renal function at this time   - Jim is familiar with dialysis and asked questions about RRT and transplant. If renal function does not improve then will proceed with transplant referral    2. 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 Heart transplant 2021 - IS regimen: TAC/MMF. TAC level 5.3. Weight 204# per patient report this morning. No edema/minimal dyspnea/b/ps teens/  Current regimen: Bumex 2 mg every day   - Will continue for now   - Per Cardiology    4. Pulmonary embolism dx'd 5/24- On Warfarin. Likely some of the etiology for his dyspnea which is improving. On Warfarin    5. Electrolytes - No acute concerns. K 4.8 Na 140    6. Acid base - No acute concerns. Bicarb 23    7. Elevated oxalate level in setting of WALTER and h/o RGBY ( 2003) - Serum oxalate level 8.6.    - Begin Tums 1000 mg TID w/meals   - Repeat oxalate level in 3 months    8. Disposition - RTC 9/9/24 for follow up with labs qowk. Will message Dr Montgomery    Assessment and plan was discussed with patient and he voiced his understanding and agreement.    Reason for Visit:  WALTER/CKD    HPI:  Mr Willingham is a 68 yo male with PMH significant for Heart transplant 2021, CKD, MICHAEL, DM2, RGBY 2003 with recent hospital admission 5/20-5/29/24 for hypervolemia, PE started on Warfarin and WALTER. Hospital discharge weight was 197#. Peak creat 3.8 on 6/3/24. Patient then redeveloped edema following hospital  discharge when diuretics were discontinued. Weight luis to 211 # and was restarted on Bumex 2 mg every day on 6/7/24. Home weight today was 204#. He is breathing much better, edema has resolved, abdomen not bloated and home b/ps teens - 120/.   Serum oxalate level 8.6    Baseline creat 1.4-1.8 prior to hospital admission    ROS:   Feeling well today  Breathing improved  Edema resolved  No CP  No abdominal bloating  No voiding concerns  Appetite is good  Energy level is slowly improving. 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, NS, ETOH rare    Allergies:  Allergies   Allergen Reactions    Grass Shortness Of Breath    Ace Inhibitors Cough    Cats     Dust Mites Other (See Comments)     Asthma    Mold Other (See Comments)     Asthma    Penicillins Other (See Comments)     Unknown - childhood exposure    Tolerated Zosyn 9/18-9/20/2020    Per patient report he has tolerated amoxicillin    Sulfa Antibiotics Other (See Comments) and Unknown     Unknown childhood reaction       Medications:  Current Outpatient Medications   Medication Sig Dispense Refill    acetaminophen (TYLENOL) 325 MG tablet Take 3 tablets (975 mg) by mouth every 8 hours as needed for mild pain 100 tablet 0    albuterol (PROAIR HFA/PROVENTIL HFA/VENTOLIN HFA) 108 (90 Base) MCG/ACT inhaler Inhale 2 puffs into the lungs every 4 hours as needed for shortness of breath, wheezing or cough 40.2 g 2    allopurinol (ZYLOPRIM) 300 MG tablet TAKE 1 TABLET BY MOUTH DAILY 100  tablet 3    bumetanide (BUMEX) 1 MG tablet Take 2 tablets (2 mg) by mouth daily 180 tablet 3    buPROPion (WELLBUTRIN XL) 300 MG 24 hr tablet Take 1 tablet (300 mg) by mouth every morning 90 tablet 1    calcium carbonate (TUMS) 500 MG chewable tablet Take 2 tablets (1,000 mg) by mouth 3 times daily (with meals) 300 tablet 3    cyanocobalamin (VITAMIN B-12) 1000 MCG tablet Take 1 tablet (1,000 mcg) by mouth daily 90 tablet 3    diphenhydrAMINE (BENADRYL) 25 MG tablet Take 25 mg by mouth every 6 hours as needed for itching      gabapentin (NEURONTIN) 300 MG capsule Take 300 mg by mouth daily      ipratropium - albuterol 0.5 mg/2.5 mg/3 mL (DUONEB) 0.5-2.5 (3) MG/3ML neb solution Take 1 vial by nebulization 4 times daily as needed for shortness of breath, wheezing or cough      Melatonin 10 MG CAPS Take 1 capsule by mouth nightly as needed      montelukast (SINGULAIR) 10 MG tablet TAKE 1 TABLET BY MOUTH AT  BEDTIME 100 tablet 3    mycophenolate (GENERIC EQUIVALENT) 250 MG capsule Take 2 capsules (500 mg) by mouth 2 times daily 180 capsule 3    polyethylene glycol (MIRALAX) 17 GM/Dose powder Take 1 Capful by mouth daily as needed for constipation      rosuvastatin (CRESTOR) 10 MG tablet Take 1 tablet (10 mg) by mouth daily 90 tablet 0    tacrolimus (GENERIC EQUIVALENT) 0.5 MG capsule Take 1 capsule (0.5 mg) by mouth every morning With 3 mg capsules 90 capsule 3    tacrolimus (GENERIC EQUIVALENT) 1 MG capsule TAKE 3 CAPSULES BY MOUTh IN am and in pm.      traMADol (ULTRAM) 50 MG tablet TAKE ONE TABLET BY MOUTH EVERY MORNING AND AFTERNOON AND 2 AT BEDTIME AS NEEDED FOR PAIN      VITAMIN D PO Take 1 capsule by mouth daily      warfarin ANTICOAGULANT (COUMADIN) 2.5 MG tablet Take 2.5 mg daily or as directed by anticoagulation clinic (Patient taking differently: Take 2.5 mg by mouth daily Take 2.5 mg daily or as directed by anticoagulation clinic) 30 tablet 3     No current facility-administered medications for this visit.       Vitals:  /79   Pulse 103   Wt 94.6 kg (208 lb 8 oz)   SpO2 99%   BMI 31.70 kg/m      Exam:  GENERAL APPEARANCE: Pleasant male in NAD   RESP: lungs clear to auscultation  CV: tachy  EDEMA: no LE edema bilaterally  ABDOMEN: soft, nondistended, nontender  MS: extremities normal - no gross deformities noted  SKIN: no visible rash  Neuro: A/O    LABS:   CMP  Recent Labs   Lab Test 06/12/24  1330 06/10/24  1608 06/07/24  0848 06/04/24  1250 07/11/21  1643 07/11/21  0710 07/10/21  2031 07/10/21  1741 07/10/21  0624 07/09/21  0632    134* 136 137   < > 132*  --  132* 135 136   POTASSIUM 4.8 4.6 5.1 5.2   < > 5.1   < > 5.6* 4.6 4.8   CHLORIDE 106 101 105 105   < > 104  --  103 105 108   CO2 23 21* 19* 21*   < > 23  --  24 23 23   ANIONGAP 11 12 12 11   < > 6  --  5 6 5   * 121* 100* 121*   < > 125*  --  258* 77 122*   BUN 66.9* 64.6* 68.4* 71.2*   < > 49*  --  44* 46* 41*   CR 3.80* 3.19* 2.81* 3.30*   < > 2.75*  --  2.48* 2.40* 2.33*   GFRESTIMATED 17* 21* 24* 20*   < > 23*  --  26* 27* 28*   GFRESTBLACK  --   --   --   --   --  27*  --  31* 32* 33*   QAMAR 8.3* 8.7* 8.8 8.4*   < > 7.9*  --  8.1* 7.8* 7.5*    < > = values in this interval not displayed.     Recent Labs   Lab Test 05/21/24  0535 05/20/24  1403 01/09/24  0433 01/08/24  0458   BILITOTAL 0.5 0.6 0.5 0.6   ALKPHOS 110 132 80 95   ALT 38 49 12 12   AST 34 48* 15 16     CBC  Recent Labs   Lab Test 05/29/24  0600 05/28/24  0607 05/27/24  0526 05/26/24  0556   HGB 10.4* 10.5* 9.9* 9.9*   WBC 5.4 5.6 6.0 6.0   RBC 3.46* 3.59* 3.38* 3.42*   HCT 32.5* 33.8* 32.0* 32.3*   MCV 94 94 95 94   MCH 30.1 29.2 29.3 28.9   MCHC 32.0 31.1* 30.9* 30.7*   RDW 14.4 14.0 13.9 13.8    215 188 206     URINE STUDIES  Recent Labs   Lab Test 05/21/24  1306 05/20/24  1351 03/21/24  1007 01/06/24  1351   COLOR Light Yellow Straw Yellow Light Yellow   APPEARANCE Clear Clear Clear Clear   URINEGLC Negative Negative Negative Negative   URINEBILI Negative  Negative Negative Negative   URINEKETONE Trace* Negative Negative Negative   SG 1.015 1.007 1.015 1.017   UBLD Negative Negative Trace* Moderate*   URINEPH 5.5 6.5 5.5 5.5   PROTEIN Negative Negative Trace* 50*   UROBILINOGEN  --   --  0.2  --    NITRITE Negative Negative Negative Negative   LEUKEST Negative Negative Trace* Negative   RBCU <1 0 0-2 1   WBCU <1 <1 5-10* 1     No lab results found.  PTH  Recent Labs   Lab Test 03/28/23  1129   PTHI 529*     IRON STUDIES  Recent Labs   Lab Test 05/23/24  0511 08/05/21  0931 05/13/21  0534   IRON 16* 30* 38    240 215*   IRONSAT 5* 13* 18   VITA 24* 98 98       Gisela English, NP

## 2024-06-14 NOTE — TELEPHONE ENCOUNTER
Pt reports weight is stable at 204-205# on the 2 mg Bumex daily.   Breathing much better than when his weight was 211#.     He is camping with granddaughters and doing well.     Labs due the week of 5/29. Pt said he gets INR checked freq so no problem.

## 2024-06-17 NOTE — PROGRESS NOTES
"ANTICOAGULATION MANAGEMENT     Jim Willingham 69 year old male is on warfarin with supratherapeutic INR result. (Goal INR 2.0-3.0)    Recent labs: (last 7 days)     06/17/24  0845   INR 3.29*       ASSESSMENT     Source(s): Chart Review and Patient/Caregiver Call     Warfarin doses taken: Warfarin taken as instructed  Diet: No new diet changes identified  Medication/supplement changes:  6/12/24 at Nephrology OV, Calcium/Vitamin D supplement discontinued and started Tums - no expected Warfarin interactions.   New illness, injury, or hospitalization: No  Signs or symptoms of bleeding or clotting: No  Previous result: Supratherapeutic - last [facility] encounter, instructed for partial holds 6/11 & 6/12, and then 8.5% maintenance dose decrease (took as instructed).  Additional findings:  Warfarin started 5/23/24 for PE Dx. Per Nephrology OV note 6/12/24:  \"Pulmonary embolism dx'd 5/24- On Warfarin. Likely some of the etiology for his dyspnea which is improving.\"  Already took Warfarin today.        PLAN     Recommended plan for no diet, medication or health factor changes affecting INR     Dosing Instructions: partial hold then decrease your warfarin dose (9.1% change) with next INR in 1 week       Summary  As of 6/17/2024      Full warfarin instructions:  6/18: 2.5 mg; Otherwise 2.5 mg every Wed; 3.75 mg all other days   Next INR check:  6/24/2024               Telephone call with Jim who verbalizes understanding and agrees to plan  Sent [employer] message with dosing and follow up instructions    Lab visit scheduled    Education provided:   Goal range and lab monitoring: goal range and significance of current result  Interaction IS NOT anticipated between warfarin and Calcium/Vitamin D supplement or Tums.  Symptom monitoring: monitoring for bleeding signs and symptoms, monitoring for stroke signs and symptoms, when to seek medical attention/emergency care, and if you hit your head or have a bad fall seek emergency " care  Written instructions provided  Contact [phone]  with any changes, questions or concerns.     Plan made with [facility] Pharmacist Marilyn Ross, RN  Anticoagulation Clinic  6/17/2024    _______________________________________________________________________     Anticoagulation Episode Summary       Current INR goal:  2.0-3.0   TTR:  0.0% (2 wk)   Target end date:  Indefinite   Send INR reminders to:  [facility]    Indications    Transplanted heart (H) [Z94.1]  Pulmonary embolism (H) [I26.99]  Single subsegmental pulmonary embolism without acute cor pulmonale (H) [I26.93]             Comments:               Anticoagulation Care Providers       Provider Role Specialty Phone number    Riaz Montaño MD Referring Cardiovascular Disease [phone]    Rajani Wilder, APRN CNP Referring Cardiovascular Disease [phone]

## 2024-06-17 NOTE — TELEPHONE ENCOUNTER
Still taking bumex 2 mg daily.    Weight is 210 lbs.     Breathing is same.     Feeling a little bloated. No lower ext edema.     Ribs pizza salad.     Outside in the humidity.     After reviewing with Dr. Carrera the following plan discussed with patient:     Bumex 2 mg bid x 3 days. [facility] Thursday.

## 2024-06-21 NOTE — PROGRESS NOTES
Transplant referral placed. CKD Journey referral placed. Sent [facility] message to [place] regarding a PD tour near him ([facility] is closest).      Spoke to Jim over phone. He would like to take a Kidney Smart class before touring a unit. Writer sent Kidney Smart referral.

## 2024-06-21 NOTE — PROGRESS NOTES
Gisela English, Delisa Ramos, REGINALD; Sarah Mosley RN; P [id number]  Tomelmira Jim aldana's renal function is declining pretty rapidly. Can we get a transplant evaluation started?  Radha can you make contact with him regarding dialysis unit tour? I think we still have time for PD if chosen  Clinic coordinator: please add him onto my schedule at 3 pm on 7/12/24. He will need a lab appt at 2.    Thanks everyone. I'll communicate with Dr Montgomery    Fiordaliza

## 2024-06-21 NOTE — TELEPHONE ENCOUNTER
Labs reviewed with patient. Pt states weight is stable at 209-210. Little fluid in legs. He continues taking bumex 2 mg bid. He doesn't feel as bloated in belly. SOB when lifting but not at rest. Message sent to Dr. Montgomery for review.       Plan reviewed in telephone encounter from 6/21 after messages back and forth between Dr. Montgomery and nephrology team.

## 2024-06-21 NOTE — TELEPHONE ENCOUNTER
Pt called to discuss plan after Dr. Montgomery communicated with Nephrology team. Pt will get labs on Monday as scheduled and see Dr. Montgomery next Thuresday 6/27.

## 2024-06-21 NOTE — LETTER
Jim Willingham  [address] [facility] [address]                July 19, 2024    Erick Acostather,     It was a pleasure to speak with you over the phone today in preparation of your pre-kidney transplant evaluation. I am sending this letter to review the pre-transplant information we covered.     Please see your schedule in your My Chart for your pre-kidney transplant evaluation on 09/30/2024 starting at 7:30 AM. All your appointments will be at the:     [facility]  [address] [place] [id number]    For parking options, please park in the open lot across the street from the front door of our Clinic.  Otherwise, enter the [facility] / arrival plaza from [facility] and attendants can assist you based on your needs.  parking is available for those with limited mobility M-F from 7:00 am to 5:00 pm.     All in-person provider appointments will be on the third floor, 3A. Lab, EKG, and chest x-ray will be on the 1st floor. Echocardiogram will be on the 3rd floor. There are help desks in the clinic that can provide additional information, if you should need assistance.    Please bring your designated care person(s) to your appointment day to help listen to the patient education and ask questions that are important to you. You can eat/drink normally on this day. Please do not fast, as the appointment day will most likely go until 3 PM. There is a coffee shop on street level for you to purchase food and you can also bring food from home. Please be aware there are no microwaves or refrigerators for patient use at the clinic. Also, take all your prescribed medications as ordered on this day. There are no medication lab tests ordered during your appointments.    Upon completion of your appointments, I will compile the outcomes and have your results reviewed at the Transplant Team Selection Committee on Wednesday, 10/09/2024, of the following  week. This is a medical review meeting only, you will not be asked to attend. I will call you within 10 business days after this meeting to inform you of the outcomes and to assist in planning for the completion of your evaluation. I will also send you a transplant evaluation summary letter after our call.     You will receive an email from our Transplant Office which contains a Receipt of Information consent and patient educational materials. Please read and electronically sign the Receipt of Information consent as well as read the educational materials prior to your evaluation appointments on 09/30/2024.     Your virtual education class is scheduled for 08/08/2024 at 9:30 AM. Education must be completed prior to activation on the transplant list.   I am including the link to the video set you will be watching in class here for your reference. You will need to filter for English, kidney in order to find the correct set.      Additional transplant resources are as follows:   www.unos.org. UNOS, or United Network of Organ Sharing, is the national organization in our country that maintains all the organ wait lists and is responsible for the rules and regulations for organ allocation. I recommend looking at the Transplant Living section as this area has content created for patients.   www.srtr.org SRTR, or the Scientific Registry for Transplant Recipients is a national data base that all Transplant Centers report their success and failure rate for all organ types twice per year. The results are public knowledge and do provide a good perspective of organ transplant.     If the transplant providers tell you at your appointments that you should start to have live donors register with our Program to initiate their evaluations, please provide this registration website: [email or web address]   The donor will receive a detailed email response back with information and next steps specific to their  situation. It is important that the donor responds to the email in order to move forward with their evaluation. Donors can also call our Office and ask to speak with a live donor coordinator in the event of questions at [phone].     Please let me know of any questions or concerns.     Thank you,  Sarah Mosley, RN, BSN  Pre Kidney Pancreas Transplant Coordinator   [facility]  Solid Organ Transplant [address]  [place] [address]  [email or web address]   Office Number: [phone] Direct Number: [phone]   Fax Number: [phone]  Employed by [facility]     CC's: Dr. Ricardo Gómez, Fiordaliza English NP, Yolette Rueda RN Post Heart Transplant Coordinator

## 2024-06-24 NOTE — TELEPHONE ENCOUNTER
SOT KIDNEY INTAKE     PCP: Ricardo Gómez MD  Referring Organization: [facility]  Referring Provider: Gisela English NP  Referring Diagnosis: CKD, stage 4    GFR/Date: 12 (6/20/24)      Is patient diabetic? Used to be, but not now (if not diabetic go to next section)  Is patient on insulin? no  Is patient under the age of 65? no  Was patient offered a pancreas transplant referral? no    Previous transplants: heart 5/6/21  Cancer history: no  Cardiac history: idiopathic cardiomyopathy  Biopsy: heart  Oxygen use at rest: no with activity: no    BMI: 32.54 (BMI> 40 - RNCC call only/ no PKE date)      Is patient in a group home/assisted living? no  Does patient have a guardian? no    Referral intake process completed.  Patient is aware that after financial approval is received, medical records will be requested.   Patient confirmed for a callback from transplant coordinator on 7/12/24. (within 2 weeks)  Tentative evaluation date 9/30/24 slot 1 .    Confirmed coordinator will discuss evaluation process in more detail at the time of their call.   Patient is aware of the need to arrange age appropriate cancer screening, vaccinations, and dental care.  Reminded patient to complete questionnaire, complete medical records release, and review packet prior to evaluation visit .  Assessed patient for special needs (ie-wheelchair, assistance, guardian, and ): None needed   Patient instructed to call [phone] with questions.     Patient gave verbal consent during intake call to obtain medical records and documents outside of [facility] [place]:  Yes

## 2024-06-24 NOTE — PROGRESS NOTES
ANTICOAGULATION MANAGEMENT     Jim Willingham 69 year old male is on warfarin with supratherapeutic INR result. (Goal INR 2.0-3.0)    Recent labs: (last 7 days)     06/24/24  1035   INR 3.59*       ASSESSMENT     Source(s): Chart Review and Patient/Caregiver Call     Warfarin doses taken: Warfarin taken as instructed  Diet: No new diet changes identified  Medication/supplement changes: None noted  New illness, injury, or hospitalization: No  Signs or symptoms of bleeding or clotting: No  Previous result: Supratherapeutic  Additional findings:  Jim has already taken a 3.75 mg dose of warfarin today; he will do a partial hold 6/25/24.       PLAN     Recommended plan for no diet, medication or health factor changes affecting INR     Dosing Instructions: partial hold then decrease your warfarin dose (10.5% change) with next INR in 1 week  (10.5% decrease from last 7 day warfarin dose total).     Summary  As of 6/24/2024      Full warfarin instructions:  6/25: 2.5 mg; Otherwise 3.75 mg every Sun, Tue, Thu; 2.5 mg all other days   Next INR check:  7/2/2024               Telephone call with Jim who verbalizes understanding and agrees to plan  Sent [employer] message with dosing and follow up instructions    Lab visit scheduled    Education provided:   Symptom monitoring: monitoring for bleeding signs and symptoms, when to seek medical attention/emergency care, and if you hit your head or have a bad fall seek emergency care  Contact [phone]  with any changes, questions or concerns.     Plan made with [facility] Pharmacist Marilyn Bermudez, RN  Anticoagulation Clinic  6/24/2024    _______________________________________________________________________     Anticoagulation Episode Summary       Current INR goal:  2.0-3.0   TTR:  0.0% (3 wk)   Target end date:  Indefinite   Send INR reminders to:  [facility]    Indications    Transplanted heart (H) [Z94.1]  Pulmonary embolism (H) [I26.99]  Single  subsegmental pulmonary embolism without acute cor pulmonale (H) [I26.93]             Comments:               Anticoagulation Care Providers       Provider Role Specialty Phone number    Riaz Montaño MD Referring Cardiovascular Disease [phone]    Rajani Wilder APRN CNP Referring Cardiovascular Disease [phone]

## 2024-06-25 NOTE — TELEPHONE ENCOUNTER
Call returned to patient. Questions answered. Bmp and clinic scheduled for Thursday. If symptoms worsen pt to call back or present to ER for evaluation.

## 2024-06-27 NOTE — NURSING NOTE
Transplant Coordinator Note    Reason for visit: Follow up after recent WALTER and PE.   Coordinator: Present       Serostatus:  - CMV D+/R+  - EBV D+/R+  - Toxo D-/R-    Last cath 5/2023.   Last echo 5/28/24  Last RHC 5/23/24  Last HBX 5/23/24  Known low level DSAS  Immuknow 535.     Recent hospitalization for PE and WALTER    Health concerns addressed today:  1. Review recent zio. EP follow up on 7/2.   2. Switch off tacro?  3. Sees nephrology on 7/12.   4. Bumex 2 mg bid.   5.  amyloid labs for AL ordered 5/27 and pending   6. Heme referral 3  months post starting coumadin. Scheudled on 7/8.   7. Will do angiogram if they proceed with angiogram.         Immunosuppressants:  Tacro goal 4-6. Current dose 3.5/3. Recent level 5.3.    mg bid (Aspergillus) Was 250 mg bid and increased to 500 mg bid for DSAS      Routine screenings:    Derm:   Dental:  Colonoscopy:  Prostate:  Eye:   Flu/Pneumonia:     Resulted labs reviewed with patient  Medication record reviewed and reconciled  Questions and concerns addressed  Pt verbalized an understanding of plan of care.     Patient Instructions  1. Increase bumex to 3 mg twice a day.   2. Weekly labs.   3. Watch sodium intake and no more than 2 Liters of fluids a day.   4. Watch out for potassium rich foods.   5. We don't recommend traveling at this time.     Next transplant clinic appointment:  TBD  Next lab draw: Weekly.   Coordinator will call with all pending results.     Please call transplant coordinator with any questions:    Yolette Rueda RN BSN   Post Heart Transplant Nurse Coordinator  [facility]  Questions: [phone]

## 2024-06-27 NOTE — LETTER
6/27/2024      RE: Jim Willingham  [address] [facility] [id number]       Dear Colleague,    Thank you for the opportunity to participate in the care of your patient, Jim Willingham, at the [facility] [place] at [facility]. Please see a copy of my visit note below.    ADULT HEART TRANSPLANT CLINIC      January 18, 2024        Mr. Jim Willingham is a 68yr old male with a history of NICM (s/p HM2 LVAD 6/2017) s/p OHT 5/6/21,     Since her last visit he reports feeling the best he has felt in years.  He feels like he is in his early 40s.  Went to [place] to [place] with his granddaughter this past year.     He denies PND orthopnea or other heart failure symptoms.    Tolerating his immunosuppression. No diarrhea.     Did have influenza this fall, full recovery.     Transplant-related complications initially:   c/b right pleural air leak (required prolonged chest tube),CAP (RLL, 6/2021), recurrent pleural effusions (s/p thoracenteses x2 6/2021 and 7/2021, exudative, repeatedly cultured negative), RLL cavitary lesions (per chest CT 7/14/21, BD glucan indeterminate, aspergillus negative, not started on antifungals), pericardial effusion (1.4cm per TTE 7/12/21, conservatively managed), low-grade EBV viremia, recurrent/persistent gout flare, transaminase elevation with questionable choledocholithiasis (conservatively managed with resolution), COVID-19 (8/2021, s/p monoclonal antibodies and remdesivir x5 days), and 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    This visit:     Just had SBO and required surgery   Did have small aspiration with this   Received pred and levofloxacin and getting better     On his way to a full recovery and staples just came out   Bowel movements are normal   Overall has been feeling very well before this       During last [id number]    BID   Due to nweq DSA   Tac: 4-6            Rejection history:  none  DSAs:  none  Graft function: Normal  Opportunistic infections: EBV viremia which was been low-grade as well as cavitary lung lesions positive Aspergillus status post treatment    Serostatus:  CMV D+/R+  EBV D+/R+  Toxo D-/R-      PAST MEDICAL HISTORY:    Past Medical History:   Diagnosis Date    Bariatric surgery status 2003    Benign essential hypertension 05/11/2017    Bilateral carpal tunnel syndrome 11/02/2020    BPH with obstruction/lower urinary tract symptoms 03/21/2024    Cardiomyopathy, unspecified (H) 05/08/2017    CKD (chronic kidney disease) stage 3, GFR 30-59 ml/min (H) 05/11/2017    COPD (chronic obstructive pulmonary disease) (H) 11/02/2020    Depression 05/11/2017    Diabetes mellitus (H) 1995    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    Generalized osteoarthritis 09/26/2023    Gouty arthropathy, chronic, without tophi 11/02/2020    H/O adenomatous polyp of colon 08/03/2016    2 polyps    H/O gastric bypass 05/11/2017    History of type 2 diabetes mellitus 03/28/2023    Hyperlipidemia LDL goal <70 09/27/2022    ICD (implantable cardioverter-defibrillator), biventricular, in situ 05/11/2017    IFG (impaired fasting glucose) 09/26/2023    LVAD (left ventricular assist device) present (H)     Major depression, recurrent, chronic (H24) 11/02/2020    Mild anemia 03/18/2022    NICM (nonischemic cardiomyopathy) (H)/ EF 20% 05/11/2017    ECHO: LVEDd. 7.66 cm, Restrictive pattern , Severe mitral valve regurgitation    CECILIA (obstructive sleep apnea) 05/11/2017    Paroxysmal atrial fibrillation (H) 05/11/2017    Paroxysmal VT (H) 05/11/2017    Peripheral polyneuropathy 03/28/2023    Postsurgical dumping syndrome 03/21/2024    Pulmonary cavitary lesion 11/18/2021    Rotator cuff tear arthropathy of both shoulders 11/02/2020    Type 2 diabetes mellitus with diabetic polyneuropathy (H) 03/18/2022    Uncomplicated asthma     Vitamin B12 deficiency (non anemic) 05/11/2017        CURRENT MEDICATIONS:  Current Outpatient Medications   Medication Sig Dispense Refill    acetaminophen (TYLENOL) 325 MG tablet Take 3 tablets (975 mg) by mouth every 8 hours as needed for mild pain 100 tablet 0    albuterol (PROAIR HFA/PROVENTIL HFA/VENTOLIN HFA) 108 (90 Base) MCG/ACT inhaler Inhale 2 puffs into the lungs every 4 hours as needed for shortness of breath, wheezing or cough 40.2 g 2    allopurinol (ZYLOPRIM) 300 MG tablet TAKE 1 TABLET BY MOUTH DAILY 100 tablet 3    bumetanide (BUMEX) 1 MG tablet Take 2 tablets (2 mg) by mouth daily 180 tablet 3    buPROPion (WELLBUTRIN XL) 300 MG 24 hr tablet Take 1 tablet (300 mg) by mouth every morning 90 tablet 1    calcium carbonate (TUMS) 500 MG chewable tablet Take 2 tablets (1,000 mg) by mouth 3 times daily (with meals) 300 tablet 3    cyanocobalamin (VITAMIN B-12) 1000 MCG tablet Take 1 tablet (1,000 mcg) by mouth daily 90 tablet 3    diphenhydrAMINE (BENADRYL) 25 MG tablet Take 25 mg by mouth every 6 hours as needed for itching      gabapentin (NEURONTIN) 300 MG capsule Take 300 mg by mouth daily      ipratropium - albuterol 0.5 mg/2.5 mg/3 mL (DUONEB) 0.5-2.5 (3) MG/3ML neb solution Take 1 vial by nebulization 4 times daily as needed for shortness of breath, wheezing or cough      Melatonin 10 MG CAPS Take 1 capsule by mouth nightly as needed      montelukast (SINGULAIR) 10 MG tablet TAKE 1 TABLET BY MOUTH AT  BEDTIME 100 tablet 3    mycophenolate (GENERIC EQUIVALENT) 250 MG capsule Take 2 capsules (500 mg) by mouth 2 times daily 180 capsule 3    polyethylene glycol (MIRALAX) 17 GM/Dose powder Take 1 Capful by mouth daily as needed for constipation      rosuvastatin (CRESTOR) 10 MG tablet Take 1 tablet (10 mg) by mouth daily 90 tablet 0    tacrolimus (GENERIC EQUIVALENT) 0.5 MG capsule Take 1 capsule (0.5 mg) by mouth every morning With 3 mg capsules 90 capsule 3    tacrolimus (GENERIC EQUIVALENT) 1 MG capsule TAKE 3 CAPSULES BY MOUTh IN am and in pm.       traMADol (ULTRAM) 50 MG tablet TAKE ONE TABLET BY MOUTH EVERY MORNING AND AFTERNOON AND 2 AT BEDTIME AS NEEDED FOR PAIN      Vitamin D, Cholecalciferol, 25 MCG (1000 UT) CAPS Take 25 mcg by mouth daily 90 capsule 3    warfarin ANTICOAGULANT (COUMADIN) 2.5 MG tablet Take 2.5 mg daily or as directed by anticoagulation clinic (Patient taking differently: Take 2.5 mg by mouth daily Take 2.5 mg daily or as directed by anticoagulation clinic) 30 tablet 3         Exam:    /89 (BP Location: Right arm, Patient Position: Chair, Cuff Size: Adult Regular)   Pulse 113   Wt 97.5 kg (215 lb)   SpO2 99%   BMI 32.69 kg/m      General: the patient is a pleasant male in no apparent distress.    HEENT: NC/AT. PERRLA. EOMI.  Sclerae white, not injected.    Neck:  No adenopathy, No thyromegaly.    Cardiac: No jugular venous distention when sitting upright.  RRR.  Normal S1 S2 splits physiologically.  No murmur, rub click, or gallop.     Abdomen: soft, nontender, nondistended.  No organomegaly.  Extremities:  No clubbing, cyanosis, or LE edema.    Neuro: Alert & Oriented x 3, grossly non focal.  Integument: no open lesions, rashes, or jaundice.      DSA negative  allomap 35      Last Immuknow: 348     Cr 1.4 (improved)   Na 137   K 5.4     WBC 7.7   Hgb: 11.6   Plt: 212      Assessment and Plan:  Mr. Jim Willingham is a 68 yr old male with a history of NICM (s/p HM2 LVAD 6/2017) s/p OHT 5/6/21     Initial post transplant course was complex as detailed above but he is doing very well now.      graft function is normal, he has no history of rejection, immunknow with a goal range he has no donor specific antibodies.       Immunosuppression:  - MMF 500mg twice daily  - tacro, goal level 4-6.       PPx:  - CAV:  Aspirin 81mg daily and rosuvastatin 10mg daily.  - GI: Pantoprazole 40mg daily  - Osteoporosis: Calcium/vitamin D supplements     Rejection history:  none  AlloMap scores:  < 35 recently  DSAs:  none      Serostatus:  - CMV  D+/R+  - EBV D+/R+  - Toxo D-/R-     KALI cavitary lesion  +Aspergillus (9/2021)    S/p 2 months of voriconazole- resolved .     EBV viremia- low  level   - stable He remains asymptomatic.      Recurrent CMV - getting frequency surveillance and running MMF low          Recent SBO: recovering     Recent aspiration with wheezing- 20 mg pred given x 5 days     Health care maintenance: per coordinator note     RTC for 2 nd annual         CC  Patient Care Team:  Tommy Gómez MD as PCP - General (Internal Medicine)  Elfego Hayden MD as MD (Internal Medicine)  Delisa Montgomery MD as Referring Physician (Cardiology)  Chase Qureshi MD as MD (Internal Medicine-Hematology & Oncology)  Kadie Pedro LICSW as  ( - Clinical)  Yolette Rueda, REGINALD as Transplant Coordinator  Akshat Parkinson [facility] as Pharmacist (Pharmacist)  Marcos Washington MD as MD (Infectious Diseases)  Lyle Sarmiento DO as MD (Nephrology)  Tommy Gómez MD as Assigned PCP  Corey Melendrez MD as MD (Dermatology)  Delisa Montgomery MD as Referring Physician (Cardiovascular Disease)  Nba Aguirre MD as MD (Neurology)  Shelia Means NP as Nurse Practitioner (Cardiovascular Disease)  Delisa Montgomery MD as Assigned Heart and Vascular Provider  Kayy Brooks APRN CNP as Referring Physician (Internal Medicine - Pediatrics)  Dk Cain MD as MD (Urology)  Delisa Reynolds RN as Specialty Care Coordinator (Nephrology)  Gisela English NP as Assigned Nephrology Provider  Dk Cain MD as Assigned Surgical Provider  TOMMY GÓMEZ      Please do not hesitate to contact me if you have any questions/concerns.     Sincerely,     Delisa Montgomery MD

## 2024-06-27 NOTE — PROGRESS NOTES
ANTICOAGULATION MANAGEMENT     Jim Willingham 69 year old male is on warfarin with supratherapeutic INR result. (Goal INR 2.0-3.0)    Recent labs: (last 7 days)     06/27/24  1435   INR 3.25*       ASSESSMENT     Source(s): Chart Review and Patient/Caregiver Call     Warfarin doses taken: Warfarin taken as instructed  Diet: No new diet changes identified  Medication/supplement changes: None noted  New illness, injury, or hospitalization: No  Signs or symptoms of bleeding or clotting: No  Previous result: Supratherapeutic  Additional findings:  Jim is being seen in cardiology clinic today--he will let the [facility] know if he has any medication changes  He has already taken today's dose of warfarin--discussed taking warfarin in the evening, so doses can be adjusted on day he has INR.  He agrees to this.       PLAN     Recommended plan for no diet, medication or health factor changes affecting INR     Dosing Instructions: Continue your current warfarin dose with next INR in 5 days.  Jim's warfarin dose was decreased 6/24/24 and INR is coming down.  He has already taken today's dose of warfarin, and will take the smaller dose the next 2 days--he will have a 10% decrease in dose.        Summary  As of 6/27/2024      Full warfarin instructions:  3.75 mg every Sun, Tue, Thu; 2.5 mg all other days   Next INR check:  7/2/2024               Telephone call with Jim who verbalizes understanding and agrees to plan  Sent [employer] message with dosing and follow up instructions    Lab visit scheduled    Education provided: Symptom monitoring: monitoring for bleeding signs and symptoms    Plan made per [facility] anticoagulation protocol    Kirsty Bermudez RN  Anticoagulation Clinic  6/27/2024    _______________________________________________________________________     Anticoagulation Episode Summary       Current INR goal:  2.0-3.0   TTR:  0.0% (3.4 wk)   Target end date:  Indefinite   Send INR reminders to:  [facility]    Indications    Transplanted heart (H) [Z94.1]  Pulmonary embolism (H) [I26.99]  Single subsegmental pulmonary embolism without acute cor pulmonale (H) [I26.93]             Comments:               Anticoagulation Care Providers       Provider Role Specialty Phone number    Riaz Montaño MD Referring Cardiovascular Disease [phone]    Rajnai Wilder APRN CNP Referring Cardiovascular Disease [phone]

## 2024-06-27 NOTE — NURSING NOTE
Chief Complaint   Patient presents with    Follow Up     RETURN FOR HEART TRANSPLANT       Vitals were taken, medications reconciled.     Jonathan Darby, Visit Facilitator    3:46 PM

## 2024-06-27 NOTE — PROGRESS NOTES
ADULT HEART TRANSPLANT CLINIC      January 18, 2024        Mr. Jim Willingham is a 68yr old male with a history of NICM (s/p HM2 LVAD 6/2017) s/p OHT 5/6/21,     Since her last visit he reports feeling the best he has felt in years.  He feels like he is in his early 40s.  Went to [place] to [place] with his granddaughter this past year.     He denies PND orthopnea or other heart failure symptoms.    Tolerating his immunosuppression. No diarrhea.     Did have influenza this fall, full recovery.     Transplant-related complications initially:   c/b right pleural air leak (required prolonged chest tube),CAP (RLL, 6/2021), recurrent pleural effusions (s/p thoracenteses x2 6/2021 and 7/2021, exudative, repeatedly cultured negative), RLL cavitary lesions (per chest CT 7/14/21, BD glucan indeterminate, aspergillus negative, not started on antifungals), pericardial effusion (1.4cm per TTE 7/12/21, conservatively managed), low-grade EBV viremia, recurrent/persistent gout flare, transaminase elevation with questionable choledocholithiasis (conservatively managed with resolution), COVID-19 (8/2021, s/p monoclonal antibodies and remdesivir x5 days), and 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    This visit:     Just had SBO and required surgery   Did have small aspiration with this   Received pred and levofloxacin and getting better     On his way to a full recovery and staples just came out   Bowel movements are normal   Overall has been feeling very well before this       During last [id number]    BID   Due to nweq DSA   Tac: 4-6           Rejection history:  none  DSAs:  none  Graft function: Normal  Opportunistic infections: EBV viremia which was been low-grade as well as cavitary lung lesions positive Aspergillus status post treatment    Serostatus:  CMV D+/R+  EBV D+/R+  Toxo D-/R-      PAST MEDICAL HISTORY:    Past Medical History:   Diagnosis Date    Bariatric surgery status  2003    Benign essential hypertension 05/11/2017    Bilateral carpal tunnel syndrome 11/02/2020    BPH with obstruction/lower urinary tract symptoms 03/21/2024    Cardiomyopathy, unspecified (H) 05/08/2017    CKD (chronic kidney disease) stage 3, GFR 30-59 ml/min (H) 05/11/2017    COPD (chronic obstructive pulmonary disease) (H) 11/02/2020    Depression 05/11/2017    Diabetes mellitus (H) 1995    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    Generalized osteoarthritis 09/26/2023    Gouty arthropathy, chronic, without tophi 11/02/2020    H/O adenomatous polyp of colon 08/03/2016    2 polyps    H/O gastric bypass 05/11/2017    History of type 2 diabetes mellitus 03/28/2023    Hyperlipidemia LDL goal <70 09/27/2022    ICD (implantable cardioverter-defibrillator), biventricular, in situ 05/11/2017    IFG (impaired fasting glucose) 09/26/2023    LVAD (left ventricular assist device) present (H)     Major depression, recurrent, chronic (H24) 11/02/2020    Mild anemia 03/18/2022    NICM (nonischemic cardiomyopathy) (H)/ EF 20% 05/11/2017    ECHO: LVEDd. 7.66 cm, Restrictive pattern , Severe mitral valve regurgitation    CECILIA (obstructive sleep apnea) 05/11/2017    Paroxysmal atrial fibrillation (H) 05/11/2017    Paroxysmal VT (H) 05/11/2017    Peripheral polyneuropathy 03/28/2023    Postsurgical dumping syndrome 03/21/2024    Pulmonary cavitary lesion 11/18/2021    Rotator cuff tear arthropathy of both shoulders 11/02/2020    Type 2 diabetes mellitus with diabetic polyneuropathy (H) 03/18/2022    Uncomplicated asthma     Vitamin B12 deficiency (non anemic) 05/11/2017       CURRENT MEDICATIONS:  Current Outpatient Medications   Medication Sig Dispense Refill    acetaminophen (TYLENOL) 325 MG tablet Take 3 tablets (975 mg) by mouth every 8 hours as needed for mild pain 100 tablet 0    albuterol (PROAIR HFA/PROVENTIL HFA/VENTOLIN HFA) 108 (90 Base) MCG/ACT inhaler Inhale 2 puffs into the lungs every 4 hours as needed for shortness  of breath, wheezing or cough 40.2 g 2    allopurinol (ZYLOPRIM) 300 MG tablet TAKE 1 TABLET BY MOUTH DAILY 100 tablet 3    bumetanide (BUMEX) 1 MG tablet Take 2 tablets (2 mg) by mouth daily 180 tablet 3    buPROPion (WELLBUTRIN XL) 300 MG 24 hr tablet Take 1 tablet (300 mg) by mouth every morning 90 tablet 1    calcium carbonate (TUMS) 500 MG chewable tablet Take 2 tablets (1,000 mg) by mouth 3 times daily (with meals) 300 tablet 3    cyanocobalamin (VITAMIN B-12) 1000 MCG tablet Take 1 tablet (1,000 mcg) by mouth daily 90 tablet 3    diphenhydrAMINE (BENADRYL) 25 MG tablet Take 25 mg by mouth every 6 hours as needed for itching      gabapentin (NEURONTIN) 300 MG capsule Take 300 mg by mouth daily      ipratropium - albuterol 0.5 mg/2.5 mg/3 mL (DUONEB) 0.5-2.5 (3) MG/3ML neb solution Take 1 vial by nebulization 4 times daily as needed for shortness of breath, wheezing or cough      Melatonin 10 MG CAPS Take 1 capsule by mouth nightly as needed      montelukast (SINGULAIR) 10 MG tablet TAKE 1 TABLET BY MOUTH AT  BEDTIME 100 tablet 3    mycophenolate (GENERIC EQUIVALENT) 250 MG capsule Take 2 capsules (500 mg) by mouth 2 times daily 180 capsule 3    polyethylene glycol (MIRALAX) 17 GM/Dose powder Take 1 Capful by mouth daily as needed for constipation      rosuvastatin (CRESTOR) 10 MG tablet Take 1 tablet (10 mg) by mouth daily 90 tablet 0    tacrolimus (GENERIC EQUIVALENT) 0.5 MG capsule Take 1 capsule (0.5 mg) by mouth every morning With 3 mg capsules 90 capsule 3    tacrolimus (GENERIC EQUIVALENT) 1 MG capsule TAKE 3 CAPSULES BY MOUTh IN am and in pm.      traMADol (ULTRAM) 50 MG tablet TAKE ONE TABLET BY MOUTH EVERY MORNING AND AFTERNOON AND 2 AT BEDTIME AS NEEDED FOR PAIN      Vitamin D, Cholecalciferol, 25 MCG (1000 UT) CAPS Take 25 mcg by mouth daily 90 capsule 3    warfarin ANTICOAGULANT (COUMADIN) 2.5 MG tablet Take 2.5 mg daily or as directed by anticoagulation clinic (Patient taking differently: Take 2.5  mg by mouth daily Take 2.5 mg daily or as directed by anticoagulation clinic) 30 tablet 3         Exam:    /89 (BP Location: Right arm, Patient Position: Chair, Cuff Size: Adult Regular)   Pulse 113   Wt 97.5 kg (215 lb)   SpO2 99%   BMI 32.69 kg/m      General: the patient is a pleasant male in no apparent distress.    HEENT: NC/AT. PERRLA. EOMI.  Sclerae white, not injected.    Neck:  No adenopathy, No thyromegaly.    Cardiac: No jugular venous distention when sitting upright.  RRR.  Normal S1 S2 splits physiologically.  No murmur, rub click, or gallop.     Abdomen: soft, nontender, nondistended.  No organomegaly.  Extremities:  No clubbing, cyanosis, or LE edema.    Neuro: Alert & Oriented x 3, grossly non focal.  Integument: no open lesions, rashes, or jaundice.      DSA negative  allomap 35      Last Immuknow: 348     Cr 1.4 (improved)   Na 137   K 5.4     WBC 7.7   Hgb: 11.6   Plt: 212      Assessment and Plan:  Mr. Jim Willingham is a 68 yr old male with a history of NICM (s/p HM2 LVAD 6/2017) s/p OHT 5/6/21     Initial post transplant course was complex as detailed above but he is doing very well now.      graft function is normal, he has no history of rejection, immunknow with a goal range he has no donor specific antibodies.       Immunosuppression:  - MMF 500mg twice daily  - tacro, goal level 4-6.       PPx:  - CAV:  Aspirin 81mg daily and rosuvastatin 10mg daily.  - GI: Pantoprazole 40mg daily  - Osteoporosis: Calcium/vitamin D supplements     Rejection history:  none  AlloMap scores:  < 35 recently  DSAs:  none      Serostatus:  - CMV D+/R+  - EBV D+/R+  - Toxo D-/R-     KALI cavitary lesion  +Aspergillus (9/2021)    S/p 2 months of voriconazole- resolved .     EBV viremia- low  level   - stable He remains asymptomatic.      Recurrent CMV - getting frequency surveillance and running MMF low          Recent SBO: recovering     Recent aspiration with wheezing- 20 mg pred given x 5 days     Health  care maintenance: per coordinator note     RTC for 2 nd annual         CC  Patient Care Team:  Tommy Gómez MD as PCP - General (Internal Medicine)  Elfego Hayden MD as MD (Internal Medicine)  Delisa Montgomery MD as Referring Physician (Cardiology)  Chase Qureshi MD as MD (Internal Medicine-Hematology & Oncology)  Kadie Pedro LICSW as  ( - Clinical)  Yolette Rueda, RN as Transplant Coordinator  Akshat Parkinson [facility] as Pharmacist (Pharmacist)  Marcos Washington MD as MD (Infectious Diseases)  Lyle Sarmiento DO as MD (Nephrology)  Tommy Gómez MD as Assigned PCP  Corey Melendrez MD as MD (Dermatology)  Delisa Montgomery MD as Referring Physician (Cardiovascular Disease)  Nba Aguirre MD as MD (Neurology)  Shelia Means NP as Nurse Practitioner (Cardiovascular Disease)  Delisa Montgomery MD as Assigned Heart and Vascular Provider  Kayy Brooks APRN CNP as Referring Physician (Internal Medicine - Pediatrics)  Dk Cain MD as MD (Urology)  Delisa Reynolds, RN as Specialty Care Coordinator (Nephrology)  Gisela Enlgish NP as Assigned Nephrology Provider  Dk Cain MD as Assigned Surgical Provider  TOMMY GÓMEZ

## 2024-06-27 NOTE — PATIENT INSTRUCTIONS
Patient Instructions  1. Increase bumex to 3 mg twice a day.   2. Weekly labs.   3. Watch sodium intake and no more than 2 Liters of fluids a day.   4. Watch out for potassium rich foods.   5. We don't recommend traveling at this time.     Next transplant clinic appointment:  TBD  Next lab draw: Weekly.   Coordinator will call with all pending results.     Please call transplant coordinator with any questions:    Yolette Rueda RN BSN   Post Heart Transplant Nurse Coordinator  [facility]  Questions: [phone]

## 2024-07-02 NOTE — PROGRESS NOTES
ANTICOAGULATION MANAGEMENT     Jim Willingham 69 year old male is on warfarin with therapeutic INR result. (Goal INR 2.0-3.0)    Recent labs: (last 7 days)     07/02/24  1204   INR 2.68*       ASSESSMENT     Source(s): Chart Review and Patient/Caregiver Call     Warfarin doses taken: Warfarin taken as instructed  Diet: No new diet changes identified  Medication/supplement changes:  Bumex increased to 3 mg BID-no interaction anticipated  New illness, injury, or hospitalization: Yes: Pt was having some diarrhea and got testing supplies but pt reports that this may have resolved and may not need to do stool testing after all.  Signs or symptoms of bleeding or clotting: No  Previous result: Supratherapeutic  Additional findings: MD was reduced on 6/24/24       PLAN     Recommended plan for ongoing change(s) affecting INR     Dosing Instructions: Continue your current warfarin dose with next INR in 10 days       Summary  As of 7/2/2024      Full warfarin instructions:  3.75 mg every Sun, Tue, Thu; 2.5 mg all other days   Next INR check:  7/12/2024               Telephone call with Jim who verbalizes understanding and agrees to plan    Lab visit scheduled    Education provided: Contact [phone] with any changes, questions or concerns.     Plan made per [facility] anticoagulation protocol    Saray Donahue, RN  Anticoagulation Clinic  7/2/2024    _______________________________________________________________________     Anticoagulation Episode Summary       Current INR goal:  2.0-3.0   TTR:  9.3% (4.1 wk)   Target end date:  Indefinite   Send INR reminders to:  [facility]    Indications    Transplanted heart (H) [Z94.1]  Pulmonary embolism (H) [I26.99]  Single subsegmental pulmonary embolism without acute cor pulmonale (H) [I26.93]             Comments:               Anticoagulation Care Providers       Provider Role Specialty Phone number    Riaz Montaño MD Referring Cardiovascular Disease  [phone]    Rajani Wilder, APRN CNP Referring Cardiovascular Disease [phone]

## 2024-07-02 NOTE — PROGRESS NOTES
HPI:   Mr. Willingham is a pleasant 69-year-old male who is followed by Dr. Delisa Montgomery.  Patient has history of nonischemic cardiomyopathy and underwent heart transplantation in 2021.     From an arrhythmia perspective Mr. Darby has done well until he was admitted to hospital in May 2024 with shortness of breath associated with nonprovoked pulmonary embolism.  During that hospitalization there were.'s of apparent junctional rhythm and a pacemaker was considered.  EP consultation was obtained but ultimately decided that the pacemaker was not necessarily on the Zio patch was provided for follow-up following discharge.    Review of the Zio patch today shows no evidence of excessive bradycardia.  Patient is generally in sinus rhythm in the range of 100 to 115 bpm.  This is somewhat higher than the average for orthotopic heart transplant but is being well-tolerated with no symptoms at today's visit.    Inasmuch as the patient is without any tachycardia related symptoms and the heart rate is not of excessive concern with regard to tachycardia related cardiomyopathy there is no urgency to either reduce heart rate by medications.    During his hospitalization he did report periodic episodes of shortness of breath on a daily basis but this does not seem to be an issue currently.  He believes that in part it may have been related to kidney issues.  In any event, it would be reasonable to take a wait-and-see attitude in terms of either heart rate control and sinus tachycardia or should bradycardia recur implantation of a pacemaker.    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    Generalized osteoarthritis 09/26/2023    Gouty arthropathy, chronic, without tophi 11/02/2020    H/O adenomatous polyp of colon 08/03/2016    2 polyps    H/O gastric bypass 05/11/2017    History of type 2 diabetes mellitus 03/28/2023    Hyperlipidemia LDL goal <70 09/27/2022    ICD (implantable cardioverter-defibrillator), biventricular, in situ 05/11/2017    IFG (impaired fasting glucose) 09/26/2023    LVAD (left ventricular assist device) present (H)     Major depression, recurrent, chronic (H24) 11/02/2020    Mild anemia 03/18/2022    NICM (nonischemic cardiomyopathy) (H)/ EF 20% 05/11/2017    ECHO: LVEDd. 7.66 cm, Restrictive pattern , Severe mitral valve regurgitation    CECILIA (obstructive sleep apnea) 05/11/2017    Paroxysmal atrial fibrillation (H) 05/11/2017    Paroxysmal VT (H) 05/11/2017    Peripheral polyneuropathy 03/28/2023    Postsurgical dumping syndrome 03/21/2024    Pulmonary cavitary lesion 11/18/2021    Rotator cuff tear arthropathy of both shoulders 11/02/2020    Type 2 diabetes mellitus with diabetic polyneuropathy (H) 03/18/2022    Uncomplicated asthma     Vitamin B12 deficiency (non anemic) 05/11/2017       CURRENT MEDICATIONS:  Current Outpatient Medications   Medication Sig Dispense Refill    acetaminophen (TYLENOL) 325 MG tablet Take 3 tablets (975 mg) by mouth every 8 hours as needed for mild pain 100 tablet 0    albuterol (PROAIR HFA/PROVENTIL HFA/VENTOLIN HFA) 108 (90 Base) MCG/ACT inhaler Inhale 2 puffs into the lungs every 4 hours as needed for shortness of breath, wheezing or cough 40.2 g 2    allopurinol (ZYLOPRIM) 300 MG tablet TAKE 1 TABLET BY MOUTH DAILY 100 tablet 3    bumetanide (BUMEX) 1 MG tablet Take 3 tablets (3 mg) by mouth daily 540 tablet 3    buPROPion (WELLBUTRIN XL) 300 MG 24 hr tablet Take 1 tablet (300 mg) by mouth every  morning 90 tablet 1    calcium carbonate (TUMS) 500 MG chewable tablet Take 2 tablets (1,000 mg) by mouth 3 times daily (with meals) 300 tablet 3    diphenhydrAMINE (BENADRYL) 25 MG tablet Take 25 mg by mouth every 6 hours as needed for itching      gabapentin (NEURONTIN) 300 MG capsule Take 300 mg by mouth daily      ipratropium - albuterol 0.5 mg/2.5 mg/3 mL (DUONEB) 0.5-2.5 (3) MG/3ML neb solution Take 1 vial by nebulization 4 times daily as needed for shortness of breath, wheezing or cough      Melatonin 10 MG CAPS Take 1 capsule by mouth nightly as needed      montelukast (SINGULAIR) 10 MG tablet TAKE 1 TABLET BY MOUTH AT  BEDTIME 100 tablet 3    mycophenolate (GENERIC EQUIVALENT) 250 MG capsule Take 2 capsules (500 mg) by mouth 2 times daily 180 capsule 3    polyethylene glycol (MIRALAX) 17 GM/Dose powder Take 1 Capful by mouth daily as needed for constipation      rosuvastatin (CRESTOR) 10 MG tablet Take 1 tablet (10 mg) by mouth daily 90 tablet 0    tacrolimus (GENERIC EQUIVALENT) 0.5 MG capsule Take 1 capsule (0.5 mg) by mouth every morning With 3 mg capsules 90 capsule 3    tacrolimus (GENERIC EQUIVALENT) 1 MG capsule TAKE 3 CAPSULES BY MOUTh IN am and in pm.      traMADol (ULTRAM) 50 MG tablet TAKE ONE TABLET BY MOUTH EVERY MORNING AND AFTERNOON AND 2 AT BEDTIME AS NEEDED FOR PAIN      Vitamin D, Cholecalciferol, 25 MCG (1000 UT) CAPS Take 25 mcg by mouth daily 90 capsule 3    warfarin ANTICOAGULANT (COUMADIN) 2.5 MG tablet Take 2.5 mg daily or as directed by anticoagulation clinic (Patient taking differently: Take 2.5 mg by mouth daily Take 2.5 mg daily or as directed by anticoagulation clinic) 30 tablet 3    cyanocobalamin (VITAMIN B-12) 1000 MCG tablet Take 1 tablet (1,000 mcg) by mouth daily (Patient not taking: Reported on 6/27/2024) 90 tablet 3       PAST SURGICAL HISTORY:  Past Surgical History:   Procedure Laterality Date    ANESTHESIA CARDIOVERSION N/A 05/11/2020    Procedure: ANESTHESIA, FOR  CARDIOVERSION @1100;  Surgeon: GENERIC ANESTHESIA PROVIDER;  Location: [facility]    BRONCHOSCOPY (RIGID OR FLEXIBLE), DIAGNOSTIC N/A 8/30/2021    Procedure: BRONCHOSCOPY, WITH BRONCHOALVEOLAR LAVAGE;  Surgeon: Perlman, David Morris, MD;  Location:  GI    COLONOSCOPY N/A 4/18/2024    Procedure: COLONOSCOPY, WITH POLYPECTOMY;  Surgeon: Jermaine Root MD;  [facility]    CV CORONARY ANGIOGRAM N/A 5/17/2022    Procedure: Coronary Angiogram;  Surgeon: Jeffrey Gibson MD;  Location:  HEART CARDIAC CATH LAB    CV CORONARY ANGIOGRAM N/A 5/23/2023    Procedure: Coronary Angiogram;  Surgeon: Scout Robins MD;  Location:  HEART CARDIAC CATH LAB    CV HEART BIOPSY N/A 5/13/2021    Procedure: Heart Biopsy;  Surgeon: Scout Robins MD;  Location:  HEART CARDIAC CATH LAB    CV HEART BIOPSY N/A 5/20/2021    Procedure: Heart Biopsy;  Surgeon: Jeffrey Gibson MD;  Location:  HEART CARDIAC CATH LAB    CV HEART BIOPSY N/A 5/27/2021    Procedure: Heart Biopsy;  Surgeon: Jac Dover MD;  Location:  HEART CARDIAC CATH LAB    CV HEART BIOPSY N/A 6/7/2021    Procedure: CV HEART BIOPSY;  Surgeon: Jeffrey Gibson MD;  Location:  HEART CARDIAC CATH LAB    CV HEART BIOPSY N/A 6/21/2021    Procedure: CV HEART BIOPSY;  Surgeon: Scout Robins MD;  Location:  HEART CARDIAC CATH LAB    CV HEART BIOPSY N/A 7/5/2021    Procedure: CV HEART BIOPSY;  Surgeon: Jeffrey Gibson MD;  Location:  HEART CARDIAC CATH LAB    CV HEART BIOPSY N/A 7/16/2021    Procedure: Heart Biopsy;  Surgeon: Amadeo Art MD;  Location:  HEART CARDIAC CATH LAB    CV HEART BIOPSY N/A 8/5/2021    Procedure: Heart Biopsy;  Surgeon: Jeffrey Gibson MD;  Location:  HEART CARDIAC CATH LAB    CV HEART BIOPSY N/A 8/31/2021    Procedure: Heart Cath Heart Biopsy;  Surgeon: Jeffrey Gibson MD;  Location:  HEART CARDIAC CATH LAB     CV HEART BIOPSY N/A 9/21/2021    Procedure: CV HEART BIOPSY;  Surgeon: Jeffrey Gibson MD;  [facility]    CV HEART BIOPSY N/A 10/5/2021    Procedure: CV HEART BIOPSY;  Surgeon: Jeffrey Gibson MD;  [facility]: [facility]    CV HEART BIOPSY N/A 11/4/2021    Procedure: CV HEART BIOPSY;  Surgeon: Nicola Seth MD;  [facility]: [facility]    CV HEART BIOPSY N/A 5/17/2022    Procedure: Heart Biopsy;  Surgeon: Jeffrey Gibson MD;  [facility]    CV HEART BIOPSY N/A 5/23/2023    Procedure: Heart Biopsy;  Surgeon: Scout Robins MD;  [facility]    CV HEART BIOPSY N/A 5/23/2024    Procedure: Heart Biopsy;  Surgeon: Precious Bautista MD;  Location:  HEART CARDIAC CATH LAB    CV RIGHT HEART CATH MEASUREMENTS RECORDED N/A 07/24/2019    Procedure: CV RIGHT HEART CATH;  Surgeon: Renu Sears MD;  [facility]    CV RIGHT HEART CATH MEASUREMENTS RECORDED N/A 08/05/2020    Procedure: CV RIGHT HEART CATH;  Surgeon: Nicola Seth MD;  [facility]    CV RIGHT HEART CATH MEASUREMENTS RECORDED N/A 01/07/2021    Procedure: CV RIGHT HEART CATH;  Surgeon: Jac Dover MD;  [facility]    CV RIGHT HEART CATH MEASUREMENTS RECORDED N/A 02/23/2021    Procedure: Heart Cath Right Heart Cath;  Surgeon: Jeffrey Gibson MD;  [facility]    CV RIGHT HEART CATH MEASUREMENTS RECORDED N/A 03/23/2021    Procedure: Heart Cath Right Heart Cath. request for 3/23;  Surgeon: eJffrey Gibson MD;  [facility]    CV RIGHT HEART CATH MEASUREMENTS RECORDED N/A 5/13/2021    Procedure: Right Heart Cath;  Surgeon: Scout Robins MD;  [facility]    CV RIGHT HEART CATH MEASUREMENTS RECORDED N/A 5/20/2021    Procedure:  Right Heart Cath;  Surgeon: Jeffrey Gibson MD;  Location:  HEART CARDIAC CATH LAB    CV RIGHT HEART CATH MEASUREMENTS RECORDED N/A 5/27/2021    Procedure: Right Heart Cath;  Surgeon: Jac Dover MD;  Location:  HEART CARDIAC CATH LAB    CV RIGHT HEART CATH MEASUREMENTS RECORDED N/A 6/7/2021    Procedure: CV RIGHT HEART CATH;  Surgeon: Jeffrey Gibson MD;  Location:  HEART CARDIAC CATH LAB    CV RIGHT HEART CATH MEASUREMENTS RECORDED N/A 6/21/2021    Procedure: CV RIGHT HEART CATH;  Surgeon: Scout Robins MD;  Location:  HEART CARDIAC CATH LAB    CV RIGHT HEART CATH MEASUREMENTS RECORDED N/A 7/5/2021    Procedure: CV RIGHT HEART CATH;  Surgeon: Jeffrey Gibson MD;  Location:  HEART CARDIAC CATH LAB    CV RIGHT HEART CATH MEASUREMENTS RECORDED N/A 7/16/2021    Procedure: Right Heart Cath;  Surgeon: Amadeo Art MD;  Location:  HEART CARDIAC CATH LAB    CV RIGHT HEART CATH MEASUREMENTS RECORDED N/A 8/5/2021    Procedure: CV RIGHT HEART CATH;  Surgeon: Jeffrey Gibson MD;  Location:  HEART CARDIAC CATH LAB    CV RIGHT HEART CATH MEASUREMENTS RECORDED N/A 8/31/2021    Procedure: Heart Cath Right Heart Cath;  Surgeon: Jeffrey Gibson MD;  Location:  HEART CARDIAC CATH LAB    CV RIGHT HEART CATH MEASUREMENTS RECORDED N/A 9/21/2021    Procedure: CV RIGHT HEART CATH;  Surgeon: Jeffrey Gibson MD;  Location:  HEART CARDIAC CATH LAB    CV RIGHT HEART CATH MEASUREMENTS RECORDED N/A 10/5/2021    Procedure: CV RIGHT HEART CATH;  Surgeon: Jeffrey Gibson MD;  Location:  HEART CARDIAC CATH LAB    CV RIGHT HEART CATH MEASUREMENTS RECORDED N/A 11/4/2021    Procedure: CV RIGHT HEART CATH;  Surgeon: Nicola Seth MD;  Location:  HEART CARDIAC CATH LAB    CV RIGHT HEART CATH MEASUREMENTS RECORDED N/A 5/17/2022    Procedure: Right Heart Catheterization;  Surgeon: Jeffrey Gibson  MD Sue;  Location:  HEART CARDIAC CATH LAB    CV RIGHT HEART CATH MEASUREMENTS RECORDED N/A 5/23/2023    Procedure: Right Heart Catheterization;  Surgeon: Scout Robins MD;  Location:  HEART CARDIAC CATH LAB    CV RIGHT HEART CATH MEASUREMENTS RECORDED N/A 5/23/2024    Procedure: Right Heart Cath- pre noon with rush path;  Surgeon: Precious Bautista MD;  Location:  HEART CARDIAC CATH LAB    GI SURGERY  2003    [facility]    INSERT VENTRICULAR ASSIST DEVICE LEFT (HEARTMATE II) N/A 06/19/2017    Procedure: INSERT VENTRICULAR ASSIST DEVICE LEFT (HEARTMATE II);  Median Sternotomy Heartmate II Left Ventricular Assist Device Insertion on Pump Oxygenator;  Surgeon: Ronnie Quigley MD;  Location:  OR    IR CHEST TUBE PLACEMENT NON-TUNNELED RIGHT  7/11/2021    LAPAROSCOPY DIAGNOSTIC (GENERAL) N/A 1/4/2024    Procedure: Diagnostic Laparoscopy, Exploratory Laparotomy with Extensive lysis of adhesions.;  Surgeon: Frederick Montgomery MD;  Location:  OR    ORTHOPEDIC SURGERY  1994    right knee wired    PICC DOUBLE LUMEN PLACEMENT Right 09/23/2020    5FR PICC DL. Length-43cm (1cm out).    PICC INSERTION - DOUBLE LUMEN Right 05/09/2021    IK/BRACH    RELEASE CARPAL TUNNEL BILATERAL Bilateral 02/18/2021    Procedure: Bilateral carpal tunnel release;  Surgeon: Jermaine Brand MD;  Location:  OR    TRANSPLANT HEART RECIPIENT N/A 05/05/2021    Procedure: Redo median sternotomy, lysis of adhesions, heart transplant recipient, on cardiopulmonary bypass, intraoperative transesophageal echocardiogram per anesthesia, Implantable Cardioverter Defibrillator (ICD) removal;  Surgeon: Ronnie Quigley MD;  Location:  OR       ALLERGIES:     Allergies   Allergen Reactions    Grass Shortness Of Breath    Ace Inhibitors Cough    Cats     Dust Mites Other (See Comments)     Asthma    Mold Other (See Comments)     Asthma    Penicillins Other (See Comments)     Unknown - childhood exposure    Tolerated Zosyn  -2020    Per patient report he has tolerated amoxicillin    Sulfa Antibiotics Other (See Comments) and Unknown     Unknown childhood reaction       FAMILY HISTORY:  Family History   Problem Relation Age of Onset    Cerebrovascular Disease Mother 64    Diabetes Mother     Hypertension Mother     Coronary Artery Disease Father     Diabetes Type 2  Father     Obesity Brother     Obesity Brother     Cerebrovascular Disease Daughter 40       SOCIAL HISTORY:  Social History     Tobacco Use    Smoking status: Former     Current packs/day: 0.00     Types: Cigarettes     Quit date:      Years since quittin.5     Passive exposure: Never    Smokeless tobacco: Never   Vaping Use    Vaping status: Never Used   Substance Use Topics    Alcohol use: Yes     Comment: on occasion    Drug use: No     Comment: tried gummy for anxiety       ROS:   Constitutional: No fever, chills, or sweats. Weight stable.   ENT: No visual disturbance, ear ache, epistaxis, sore throat.   Cardiovascular: As per HPI.   Respiratory: No cough, hemoptysis.    GI: No nausea, vomiting,    : No hematuria.   Integument: Negative.   Psychiatric: Negative.   Hematologic:  no easy bleeding.  Neuro: Negative.   Endocrinology: No significant heat or cold intolerance   Musculoskeletal: No myalgia.    Exam:  /80 (BP Location: Right arm, Patient Position: Chair, Cuff Size: Adult Regular)   Pulse 114   Wt 96.2 kg (212 lb)   SpO2 98%   BMI 32.23 kg/m    GENERAL APPEARANCE: healthy, alert and no distress  HEENT: no icterus,no central cyanosis  NECK: no adenopathy,  JVP not elevated  RESPIRATORY: lungs clear to auscultation - no rales, rhonchi or wheezes, no use of accessory muscles, no retractions, respirations are unlabored, normal respiratory rate  CARDIOVASCULAR: regular rhythm, normal S1 with physiologic split S2, no S3 or S4 and no murmur, click or rub, precordium quiet with normal PMI.  ABDOMEN: soft, non tender, EXTREMITIES: peripheral  pulses normal, no edema, no bruits  NEURO: alert and oriented to person/place/time, normal speech, gait and affect  osterior tibialis pulses are normal in volumes and symmetric bilaterally. No bruits are heard.  SKIN: no ecchymoses, no rashes    Labs:  CBC RESULTS:   Lab Results   Component Value Date    WBC 5.4 05/29/2024    WBC 7.8 07/11/2021    RBC 3.46 (L) 05/29/2024    RBC 3.06 (L) 07/11/2021    HGB 10.4 (L) 05/29/2024    HGB 8.7 (L) 07/11/2021    HCT 32.5 (L) 05/29/2024    HCT 28.4 (L) 07/11/2021    MCV 94 05/29/2024    MCV 93 07/11/2021    MCH 30.1 05/29/2024    MCH 28.4 07/11/2021    MCHC 32.0 05/29/2024    MCHC 30.6 (L) 07/11/2021    RDW 14.4 05/29/2024    RDW 15.6 (H) 07/11/2021     05/29/2024     07/11/2021       BMP RESULTS:  Lab Results   Component Value Date     07/02/2024     (L) 07/11/2021    POTASSIUM 4.7 07/02/2024    POTASSIUM 5.6 (H) 01/04/2024    POTASSIUM 4.3 03/28/2023    POTASSIUM 5.1 07/11/2021    CHLORIDE 98 07/02/2024    CHLORIDE 111 (H) 03/28/2023    CHLORIDE 104 07/11/2021    CO2 23 07/02/2024    CO2 23 03/28/2023    CO2 23 07/11/2021    ANIONGAP 15 07/02/2024    ANIONGAP 4 03/28/2023    ANIONGAP 6 07/11/2021     (H) 07/02/2024     (H) 05/25/2024     (H) 03/28/2023     (H) 07/11/2021    BUN 86.8 (H) 07/02/2024    BUN 25 03/28/2023    BUN 49 (H) 07/11/2021    CR 5.31 (H) 07/02/2024    CR 2.75 (H) 07/11/2021    GFRESTIMATED 11 (L) 07/02/2024    GFRESTIMATED 42 (L) 12/15/2021    GFRESTIMATED 23 (L) 07/11/2021    GFRESTBLACK 27 (L) 07/11/2021    QAMAR 8.5 (L) 07/02/2024    QAMAR 7.9 (L) 07/11/2021        INR RESULTS:  Lab Results   Component Value Date    INR 2.68 (H) 07/02/2024    INR 3.25 (H) 06/27/2024    INR 3.59 (H) 06/24/2024    INR 3.29 (H) 06/17/2024    INR 1.06 07/10/2021    INR 1.27 (H) 06/30/2021    INR 1.19 (H) 05/12/2021    INR 1.33 (H) 05/10/2021       Procedures:  PULMONARY FUNCTION TESTS:       Latest Ref Rng & Units 1/21/2021      2:06 PM   PFT   FVC L 2.38    FEV1 L 1.24    FVC% % 60    FEV1% % 40          ECHOCARDIOGRAM:   No results found for this or any previous visit (from the past 8760 hour(s)).      Assessment and Plan:   1.  Status post orthotopic heart transplantation 2021  2.  Recent history of junctional rhythm while hospitalized for PE.  This seems to have resolved based on subsequent Zio patch outpatient recording  3.  No symptoms related to either bradycardia or sinus tachycardia  4.  Ongoing sinus tachycardia in the range of 100 to 115 bpm.    Plan  1.  Continue to follow-up with the heart transplant team  2.  Return to clinic here or as needed should there be any evidence of concerning bradycardia or any reason to moderate sinus tachycardia.    Total elapsed time today with chart review, clinic visit and documentation 30 minutes    I very much appreciated the opportunity to see and assess Jim Willingham in the clinic today. Please do not hesitate to contact my office if you have any questions or concerns.      Nba Santos MD  Cardiac Arrhythmia Service  [facility]  [phone]     Copy: Delisa Montgomery MD ([facility])  CC  SELF, REFERRED

## 2024-07-02 NOTE — NURSING NOTE
Chief Complaint   Patient presents with    Follow Up     Hospital follow up for bradycardia/junctional, has heart transplant follows with zina   Review anup from June        Vitals were taken, medications reconciled and EKG performed.    Jonathan Darby, Visit Facilitator  12:29 PM

## 2024-07-02 NOTE — PATIENT INSTRUCTIONS
You were seen in the Electrophysiology Clinic today by: Dr Santos    Plan:       Follow up Visit:  EP as needed      If you have further questions, please utilize He[employer] to contact us.     Your Care Team:    EP Cardiology   Telephone Number     Nurse Line  Keyana Cherry, RN   Dionne Yen, REGINALD Resendiz, REGINALD   [phone]     For scheduling appointments:   Carlos   [phone]   For procedure scheduling:    Fiordaliza Lindquist     [phone]   For the Device Clinic (Pacemakers, ICDs, Loop Recorders)    During business hours: [phone]  After business hours:   [phone]- select option 4 and ask for job code [id number].       On-call cardiologist for after hours or on weekends:   [phone], option #4, and ask to speak to the on-call cardiologist.     Cardiovascular Clinic:   [address] [place] [address]. [place] [address]      As always, Thank you for trusting us with your health care needs!

## 2024-07-02 NOTE — LETTER
7/2/2024      RE: Jim Willingham  [address] [facility] [address]       Dear Colleague,    Thank you for the opportunity to participate in the care of your patient, Jim Willingham, at the [facility] at [facility]. Please see a copy of my visit note below.    HPI:   Mr. Willingham is a pleasant 69-year-old male who is followed by Dr. Delisa Montgomery.  Patient has history of nonischemic cardiomyopathy and underwent heart transplantation in 2021.     From an arrhythmia perspective Mr. Darby has done well until he was admitted to hospital in May 2024 with shortness of breath associated with nonprovoked pulmonary embolism.  During that hospitalization there were.'s of apparent junctional rhythm and a pacemaker was considered.  EP consultation was obtained but ultimately decided that the pacemaker was not necessarily on the Zio patch was provided for follow-up following discharge.    Review of the Zio patch today shows no evidence of excessive bradycardia.  Patient is generally in sinus rhythm in the range of 100 to 115 bpm.  This is somewhat higher than the average for orthotopic heart transplant but is being well-tolerated with no symptoms at today's visit.    Inasmuch as the patient is without any tachycardia related symptoms and the heart rate is not of excessive concern with regard to tachycardia related cardiomyopathy there is no urgency to either reduce heart rate by medications.    During his hospitalization he did report periodic episodes of shortness of breath on a daily basis but this does not seem to be an issue currently.  He believes that in part it may have been related to kidney issues.  In any event, it would be reasonable to take a wait-and-see attitude in terms of either heart rate control and sinus tachycardia or should bradycardia recur implantation of a pacemaker.    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     Generalized osteoarthritis 09/26/2023     Gouty arthropathy, chronic, without tophi 11/02/2020     H/O adenomatous polyp of colon 08/03/2016    2 polyps     H/O gastric bypass 05/11/2017     History of type 2 diabetes mellitus 03/28/2023     Hyperlipidemia LDL goal <70 09/27/2022     ICD (implantable cardioverter-defibrillator), biventricular, in situ 05/11/2017     IFG (impaired fasting glucose) 09/26/2023     LVAD (left ventricular assist device) present (H)      Major depression, recurrent, chronic (H24) 11/02/2020     Mild anemia 03/18/2022     NICM (nonischemic cardiomyopathy) (H)/ EF 20% 05/11/2017    ECHO: LVEDd. 7.66 cm, Restrictive pattern , Severe mitral valve regurgitation     CECILIA (obstructive sleep apnea) 05/11/2017     Paroxysmal atrial fibrillation (H) 05/11/2017     Paroxysmal VT (H) 05/11/2017     Peripheral polyneuropathy 03/28/2023     Postsurgical dumping syndrome 03/21/2024     Pulmonary cavitary lesion 11/18/2021     Rotator cuff tear arthropathy of both shoulders 11/02/2020     Type 2 diabetes mellitus with diabetic polyneuropathy (H) 03/18/2022     Uncomplicated asthma      Vitamin B12 deficiency (non anemic) 05/11/2017       CURRENT MEDICATIONS:  Current Outpatient Medications   Medication Sig Dispense Refill     acetaminophen (TYLENOL) 325 MG tablet Take 3 tablets (975 mg) by mouth every 8 hours as needed for mild pain 100 tablet 0     albuterol (PROAIR HFA/PROVENTIL HFA/VENTOLIN HFA) 108 (90 Base)  MCG/ACT inhaler Inhale 2 puffs into the lungs every 4 hours as needed for shortness of breath, wheezing or cough 40.2 g 2     allopurinol (ZYLOPRIM) 300 MG tablet TAKE 1 TABLET BY MOUTH DAILY 100 tablet 3     bumetanide (BUMEX) 1 MG tablet Take 3 tablets (3 mg) by mouth daily 540 tablet 3     buPROPion (WELLBUTRIN XL) 300 MG 24 hr tablet Take 1 tablet (300 mg) by mouth every morning 90 tablet 1     calcium carbonate (TUMS) 500 MG chewable tablet Take 2 tablets (1,000 mg) by mouth 3 times daily (with meals) 300 tablet 3     diphenhydrAMINE (BENADRYL) 25 MG tablet Take 25 mg by mouth every 6 hours as needed for itching       gabapentin (NEURONTIN) 300 MG capsule Take 300 mg by mouth daily       ipratropium - albuterol 0.5 mg/2.5 mg/3 mL (DUONEB) 0.5-2.5 (3) MG/3ML neb solution Take 1 vial by nebulization 4 times daily as needed for shortness of breath, wheezing or cough       Melatonin 10 MG CAPS Take 1 capsule by mouth nightly as needed       montelukast (SINGULAIR) 10 MG tablet TAKE 1 TABLET BY MOUTH AT  BEDTIME 100 tablet 3     mycophenolate (GENERIC EQUIVALENT) 250 MG capsule Take 2 capsules (500 mg) by mouth 2 times daily 180 capsule 3     polyethylene glycol (MIRALAX) 17 GM/Dose powder Take 1 Capful by mouth daily as needed for constipation       rosuvastatin (CRESTOR) 10 MG tablet Take 1 tablet (10 mg) by mouth daily 90 tablet 0     tacrolimus (GENERIC EQUIVALENT) 0.5 MG capsule Take 1 capsule (0.5 mg) by mouth every morning With 3 mg capsules 90 capsule 3     tacrolimus (GENERIC EQUIVALENT) 1 MG capsule TAKE 3 CAPSULES BY MOUTh IN am and in pm.       traMADol (ULTRAM) 50 MG tablet TAKE ONE TABLET BY MOUTH EVERY MORNING AND AFTERNOON AND 2 AT BEDTIME AS NEEDED FOR PAIN       Vitamin D, Cholecalciferol, 25 MCG (1000 UT) CAPS Take 25 mcg by mouth daily 90 capsule 3     warfarin ANTICOAGULANT (COUMADIN) 2.5 MG tablet Take 2.5 mg daily or as directed by anticoagulation clinic (Patient taking differently: Take 2.5  mg by mouth daily Take 2.5 mg daily or as directed by anticoagulation clinic) 30 tablet 3     cyanocobalamin (VITAMIN B-12) 1000 MCG tablet Take 1 tablet (1,000 mcg) by mouth daily (Patient not taking: Reported on 6/27/2024) 90 tablet 3       PAST SURGICAL HISTORY:  Past Surgical History:   Procedure Laterality Date     ANESTHESIA CARDIOVERSION N/A 05/11/2020    Procedure: ANESTHESIA, FOR CARDIOVERSION @1100;  Surgeon: GENERIC ANESTHESIA PROVIDER;  [facility]: [facility]     BRONCHOSCOPY (RIGID OR FLEXIBLE), DIAGNOSTIC N/A 8/30/2021    Procedure: BRONCHOSCOPY, WITH BRONCHOALVEOLAR LAVAGE;  Surgeon: Perlman, David Morris, MD;  Location:  GI     COLONOSCOPY N/A 4/18/2024    Procedure: COLONOSCOPY, WITH POLYPECTOMY;  Surgeon: Jermaine Root MD;  Location:  GI     CV CORONARY ANGIOGRAM N/A 5/17/2022    Procedure: Coronary Angiogram;  Surgeon: Jeffrey Gibson MD;  Location:  HEART CARDIAC CATH LAB     CV CORONARY ANGIOGRAM N/A 5/23/2023    Procedure: Coronary Angiogram;  Surgeon: Scout Robins MD;  Location:  HEART CARDIAC CATH LAB     CV HEART BIOPSY N/A 5/13/2021    Procedure: Heart Biopsy;  Surgeon: Scout Robins MD;  Location:  HEART CARDIAC CATH LAB     CV HEART BIOPSY N/A 5/20/2021    Procedure: Heart Biopsy;  Surgeon: Jeffrey Gibson MD;  Location:  HEART CARDIAC CATH LAB     CV HEART BIOPSY N/A 5/27/2021    Procedure: Heart Biopsy;  Surgeon: Jac Dover MD;  Location:  HEART CARDIAC CATH LAB     CV HEART BIOPSY N/A 6/7/2021    Procedure: CV HEART BIOPSY;  Surgeon: Jeffrey Gibson MD;  Location:  HEART CARDIAC CATH LAB     CV HEART BIOPSY N/A 6/21/2021    Procedure: CV HEART BIOPSY;  Surgeon: Scout Robins MD;  Location:  HEART CARDIAC CATH LAB     CV HEART BIOPSY N/A 7/5/2021    Procedure: CV HEART BIOPSY;  Surgeon: Jeffrey Gibson MD;  Location:  HEART CARDIAC CATH LAB     CV HEART BIOPSY N/A  7/16/2021    Procedure: Heart Biopsy;  Surgeon: Amadeo Art MD;  [facility]     CV HEART BIOPSY N/A 8/5/2021    Procedure: Heart Biopsy;  Surgeon: Jeffrey Gibson MD;  [facility]: [facility]     CV HEART BIOPSY N/A 8/31/2021    Procedure: Heart Cath Heart Biopsy;  Surgeon: Jeffrey Gibson MD;  [facility]: [facility]     CV HEART BIOPSY N/A 9/21/2021    Procedure: CV HEART BIOPSY;  Surgeon: Jeffrey Gibson MD;  [facility]     CV HEART BIOPSY N/A 10/5/2021    Procedure: CV HEART BIOPSY;  Surgeon: Jeffrey Gibson MD;  [facility]: [facility]     CV HEART BIOPSY N/A 11/4/2021    Procedure: CV HEART BIOPSY;  Surgeon: Nicola Seth MD;  [facility]: [facility]     CV HEART BIOPSY N/A 5/17/2022    Procedure: Heart Biopsy;  Surgeon: Jeffrey Gibson MD;  [facility]: [facility]     CV HEART BIOPSY N/A 5/23/2023    Procedure: Heart Biopsy;  Surgeon: Scout Robins MD;  [facility]     CV HEART BIOPSY N/A 5/23/2024    Procedure: Heart Biopsy;  Surgeon: Precious Bautista MD;  Location:  HEART CARDIAC CATH LAB     CV RIGHT HEART CATH MEASUREMENTS RECORDED N/A 07/24/2019    Procedure: CV RIGHT HEART CATH;  Surgeon: Renu Sears MD;  [facility]     CV RIGHT HEART CATH MEASUREMENTS RECORDED N/A 08/05/2020    Procedure: CV RIGHT HEART CATH;  Surgeon: Nicola Seth MD;  Location:  HEART CARDIAC CATH LAB     CV RIGHT HEART CATH MEASUREMENTS RECORDED N/A 01/07/2021    Procedure: CV RIGHT HEART CATH;  Surgeon: Jac Dover MD;  Location:  HEART CARDIAC CATH LAB     CV RIGHT HEART CATH MEASUREMENTS RECORDED N/A 02/23/2021    Procedure: Heart Cath Right Heart Cath;  Surgeon: Jeffrey Gibson MD;  Location:  HEART CARDIAC CATH LAB     CV RIGHT HEART CATH  MEASUREMENTS RECORDED N/A 03/23/2021    Procedure: Heart Cath Right Heart Cath. request for 3/23;  Surgeon: Jeffrey Gibson MD;  Location:  HEART CARDIAC CATH LAB     CV RIGHT HEART CATH MEASUREMENTS RECORDED N/A 5/13/2021    Procedure: Right Heart Cath;  Surgeon: Scout Robins MD;  Location:  HEART CARDIAC CATH LAB     CV RIGHT HEART CATH MEASUREMENTS RECORDED N/A 5/20/2021    Procedure: Right Heart Cath;  Surgeon: Jeffrey Gibson MD;  Location:  HEART CARDIAC CATH LAB     CV RIGHT HEART CATH MEASUREMENTS RECORDED N/A 5/27/2021    Procedure: Right Heart Cath;  Surgeon: Jac Dover MD;  Location:  HEART CARDIAC CATH LAB     CV RIGHT HEART CATH MEASUREMENTS RECORDED N/A 6/7/2021    Procedure: CV RIGHT HEART CATH;  Surgeon: Jeffrey Gibson MD;  Location:  HEART CARDIAC CATH LAB     CV RIGHT HEART CATH MEASUREMENTS RECORDED N/A 6/21/2021    Procedure: CV RIGHT HEART CATH;  Surgeon: Scout Robins MD;  Location:  HEART CARDIAC CATH LAB     CV RIGHT HEART CATH MEASUREMENTS RECORDED N/A 7/5/2021    Procedure: CV RIGHT HEART CATH;  Surgeon: Jeffrey Gibson MD;  Location:  HEART CARDIAC CATH LAB     CV RIGHT HEART CATH MEASUREMENTS RECORDED N/A 7/16/2021    Procedure: Right Heart Cath;  Surgeon: Amadeo Art MD;  Location:  HEART CARDIAC CATH LAB     CV RIGHT HEART CATH MEASUREMENTS RECORDED N/A 8/5/2021    Procedure: CV RIGHT HEART CATH;  Surgeon: Jeffrey Gibson MD;  Location:  HEART CARDIAC CATH LAB     CV RIGHT HEART CATH MEASUREMENTS RECORDED N/A 8/31/2021    Procedure: Heart Cath Right Heart Cath;  Surgeon: Jeffrey Gibson MD;  Location:  HEART CARDIAC CATH LAB     CV RIGHT HEART CATH MEASUREMENTS RECORDED N/A 9/21/2021    Procedure: CV RIGHT HEART CATH;  Surgeon: Jeffrey Gibson MD;  [facility]: [facility]AC [facility]     CV RIGHT HEART CATH MEASUREMENTS  RECORDED N/A 10/5/2021    Procedure: CV RIGHT HEART CATH;  Surgeon: Jeffrey Gibson MD;  Location:  HEART CARDIAC CATH LAB     CV RIGHT HEART CATH MEASUREMENTS RECORDED N/A 11/4/2021    Procedure: CV RIGHT HEART CATH;  Surgeon: Nicola Seth MD;  Location:  HEART CARDIAC CATH LAB     CV RIGHT HEART CATH MEASUREMENTS RECORDED N/A 5/17/2022    Procedure: Right Heart Catheterization;  Surgeon: Jeffrey Gibson MD;  Location:  HEART CARDIAC CATH LAB     CV RIGHT HEART CATH MEASUREMENTS RECORDED N/A 5/23/2023    Procedure: Right Heart Catheterization;  Surgeon: Scout Robins MD;  Location:  HEART CARDIAC CATH LAB     CV RIGHT HEART CATH MEASUREMENTS RECORDED N/A 5/23/2024    Procedure: Right Heart Cath- pre noon with rush path;  Surgeon: Precious Bautista MD;  Location:  HEART CARDIAC CATH LAB     GI SURGERY  2003    [facility]     INSERT VENTRICULAR ASSIST DEVICE LEFT (HEARTMATE II) N/A 06/19/2017    Procedure: INSERT VENTRICULAR ASSIST DEVICE LEFT (HEARTMATE II);  Median Sternotomy Heartmate II Left Ventricular Assist Device Insertion on Pump Oxygenator;  Surgeon: Ronnie Quigley MD;  Location:  OR     IR CHEST TUBE PLACEMENT NON-TUNNELED RIGHT  7/11/2021     LAPAROSCOPY DIAGNOSTIC (GENERAL) N/A 1/4/2024    Procedure: Diagnostic Laparoscopy, Exploratory Laparotomy with Extensive lysis of adhesions.;  Surgeon: Frederick Montgomery MD;  [facility]     ORTHOPEDIC SURGERY  1994    right knee wired     PICC DOUBLE LUMEN PLACEMENT Right 09/23/2020    5FR PICC DL. Length-43cm (1cm out).     PICC INSERTION - DOUBLE LUMEN Right 05/09/2021    IK/BRACH     RELEASE CARPAL TUNNEL BILATERAL Bilateral 02/18/2021    Procedure: Bilateral carpal tunnel release;  Surgeon: Jermaine Brand MD;  Location: [facility] OR     TRANSPLANT HEART RECIPIENT N/A 05/05/2021    Procedure: Redo median sternotomy, lysis of adhesions, heart transplant recipient, on cardiopulmonary bypass, intraoperative  transesophageal echocardiogram per anesthesia, Implantable Cardioverter Defibrillator (ICD) removal;  Surgeon: Ronnie Quigley MD;  Location:  OR       ALLERGIES:     Allergies   Allergen Reactions     Grass Shortness Of Breath     Ace Inhibitors Cough     Cats      Dust Mites Other (See Comments)     Asthma     Mold Other (See Comments)     Asthma     Penicillins Other (See Comments)     Unknown - childhood exposure    Tolerated Zosyn -2020    Per patient report he has tolerated amoxicillin     Sulfa Antibiotics Other (See Comments) and Unknown     Unknown childhood reaction       FAMILY HISTORY:  Family History   Problem Relation Age of Onset     Cerebrovascular Disease Mother 64     Diabetes Mother      Hypertension Mother      Coronary Artery Disease Father      Diabetes Type 2  Father      Obesity Brother      Obesity Brother      Cerebrovascular Disease Daughter 40       SOCIAL HISTORY:  Social History     Tobacco Use     Smoking status: Former     Current packs/day: 0.00     Types: Cigarettes     Quit date:      Years since quittin.     Passive exposure: Never     Smokeless tobacco: Never   Vaping Use     Vaping status: Never Used   Substance Use Topics     Alcohol use: Yes     Comment: on occasion     Drug use: No     Comment: tried gummy for anxiety       ROS:   Constitutional: No fever, chills, or sweats. Weight stable.   ENT: No visual disturbance, ear ache, epistaxis, sore throat.   Cardiovascular: As per HPI.   Respiratory: No cough, hemoptysis.    GI: No nausea, vomiting,    : No hematuria.   Integument: Negative.   Psychiatric: Negative.   Hematologic:  no easy bleeding.  Neuro: Negative.   Endocrinology: No significant heat or cold intolerance   Musculoskeletal: No myalgia.    Exam:  /80 (BP Location: Right arm, Patient Position: Chair, Cuff Size: Adult Regular)   Pulse 114   Wt 96.2 kg (212 lb)   SpO2 98%   BMI 32.23 kg/m    GENERAL APPEARANCE: healthy, alert and no  distress  HEENT: no icterus,no central cyanosis  NECK: no adenopathy,  JVP not elevated  RESPIRATORY: lungs clear to auscultation - no rales, rhonchi or wheezes, no use of accessory muscles, no retractions, respirations are unlabored, normal respiratory rate  CARDIOVASCULAR: regular rhythm, normal S1 with physiologic split S2, no S3 or S4 and no murmur, click or rub, precordium quiet with normal PMI.  ABDOMEN: soft, non tender, EXTREMITIES: peripheral pulses normal, no edema, no bruits  NEURO: alert and oriented to person/place/time, normal speech, gait and affect  osterior tibialis pulses are normal in volumes and symmetric bilaterally. No bruits are heard.  SKIN: no ecchymoses, no rashes    Labs:  CBC RESULTS:   Lab Results   Component Value Date    WBC 5.4 05/29/2024    WBC 7.8 07/11/2021    RBC 3.46 (L) 05/29/2024    RBC 3.06 (L) 07/11/2021    HGB 10.4 (L) 05/29/2024    HGB 8.7 (L) 07/11/2021    HCT 32.5 (L) 05/29/2024    HCT 28.4 (L) 07/11/2021    MCV 94 05/29/2024    MCV 93 07/11/2021    MCH 30.1 05/29/2024    MCH 28.4 07/11/2021    MCHC 32.0 05/29/2024    MCHC 30.6 (L) 07/11/2021    RDW 14.4 05/29/2024    RDW 15.6 (H) 07/11/2021     05/29/2024     07/11/2021       BMP RESULTS:  Lab Results   Component Value Date     07/02/2024     (L) 07/11/2021    POTASSIUM 4.7 07/02/2024    POTASSIUM 5.6 (H) 01/04/2024    POTASSIUM 4.3 03/28/2023    POTASSIUM 5.1 07/11/2021    CHLORIDE 98 07/02/2024    CHLORIDE 111 (H) 03/28/2023    CHLORIDE 104 07/11/2021    CO2 23 07/02/2024    CO2 23 03/28/2023    CO2 23 07/11/2021    ANIONGAP 15 07/02/2024    ANIONGAP 4 03/28/2023    ANIONGAP 6 07/11/2021     (H) 07/02/2024     (H) 05/25/2024     (H) 03/28/2023     (H) 07/11/2021    BUN 86.8 (H) 07/02/2024    BUN 25 03/28/2023    BUN 49 (H) 07/11/2021    CR 5.31 (H) 07/02/2024    CR 2.75 (H) 07/11/2021    GFRESTIMATED 11 (L) 07/02/2024    GFRESTIMATED 42 (L) 12/15/2021     GFRESTIMATED 23 (L) 07/11/2021    GFRESTBLACK 27 (L) 07/11/2021    QAMAR 8.5 (L) 07/02/2024    QAMAR 7.9 (L) 07/11/2021        INR RESULTS:  Lab Results   Component Value Date    INR 2.68 (H) 07/02/2024    INR 3.25 (H) 06/27/2024    INR 3.59 (H) 06/24/2024    INR 3.29 (H) 06/17/2024    INR 1.06 07/10/2021    INR 1.27 (H) 06/30/2021    INR 1.19 (H) 05/12/2021    INR 1.33 (H) 05/10/2021       Procedures:  PULMONARY FUNCTION TESTS:       Latest Ref Rng & Units 1/21/2021     2:06 PM   PFT   FVC L 2.38    FEV1 L 1.24    FVC% % 60    FEV1% % 40          ECHOCARDIOGRAM:   No results found for this or any previous visit (from the past 8760 hour(s)).      Assessment and Plan:   1.  Status post orthotopic heart transplantation 2021  2.  Recent history of junctional rhythm while hospitalized for PE.  This seems to have resolved based on subsequent Zio patch outpatient recording  3.  No symptoms related to either bradycardia or sinus tachycardia  4.  Ongoing sinus tachycardia in the range of 100 to 115 bpm.    Plan  1.  Continue to follow-up with the heart transplant team  2.  Return to clinic here or as needed should there be any evidence of concerning bradycardia or any reason to moderate sinus tachycardia.    Total elapsed time today with chart review, clinic visit and documentation 30 minutes    I very much appreciated the opportunity to see and assess Jim Willingham in the clinic today. Please do not hesitate to contact my office if you have any questions or concerns.      Nba Santos MD  Cardiac Arrhythmia Service  [facility]  [phone]     Copy: Delisa Montgomery MD ([facility])  CC  SELF, REFERRED    Please do not hesitate to contact me if you have any questions/concerns.     Sincerely,     Nba Santos MD

## 2024-07-05 NOTE — ED PROVIDER NOTES
[place] EMERGENCY DEPARTMENT ([facility])    7/05/24       ED PROVIDER NOTE    History     Chief Complaint   Patient presents with    Syncope     The history is provided by the patient and medical records.     Jim Willingham is a 69 year old male with PMH notable for non-ischemic cardiomyopathy s/p open heart transplant (05/2021), recent PE (05/24/24) now on Wafarin, CKD, COPD, DM2, BPH who presents to the Emergency Department for evaluation of a fall following a syncopal episode.    Patient reports that he got up this morning to go to the bathroom and suddenly collapsed, hitting the right side of his head to the chair. He did have LOC, reportedly about 10 minutes. He woke up on himself. He reports a laceration to the head with bleeding now controlled. States that he thinks syncope is due to getting up to fast.    Patients states feeling fine now. Denies any issues with his heart transplant. He denies chest pain, dizziness, palpation associated with syncope. He denies headache, vision changes, neck pain, back pain, other injuries. He denies history of seizures. He denies similar occurrence in the past.     Per [facility] report, last TDaP was in 2020.     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    Generalized osteoarthritis 09/26/2023    Gouty arthropathy, chronic, without tophi 11/02/2020    H/O adenomatous polyp of colon 08/03/2016    2 polyps    H/O gastric bypass 05/11/2017    History of type 2 diabetes mellitus 03/28/2023     Hyperlipidemia LDL goal <70 09/27/2022    ICD (implantable cardioverter-defibrillator), biventricular, in situ 05/11/2017    IFG (impaired fasting glucose) 09/26/2023    LVAD (left ventricular assist device) present (H)     Major depression, recurrent, chronic (H24) 11/02/2020    Mild anemia 03/18/2022    NICM (nonischemic cardiomyopathy) (H)/ EF 20% 05/11/2017    ECHO: LVEDd. 7.66 cm, Restrictive pattern , Severe mitral valve regurgitation    CECILIA (obstructive sleep apnea) 05/11/2017    Paroxysmal atrial fibrillation (H) 05/11/2017    Paroxysmal VT (H) 05/11/2017    Peripheral polyneuropathy 03/28/2023    Postsurgical dumping syndrome 03/21/2024    Pulmonary cavitary lesion 11/18/2021    Rotator cuff tear arthropathy of both shoulders 11/02/2020    Type 2 diabetes mellitus with diabetic polyneuropathy (H) 03/18/2022    Uncomplicated asthma     Vitamin B12 deficiency (non anemic) 05/11/2017     Past Surgical History:   Procedure Laterality Date    ANESTHESIA CARDIOVERSION N/A 05/11/2020    Procedure: ANESTHESIA, FOR CARDIOVERSION @1100;  Surgeon: GENERIC ANESTHESIA PROVIDER;  [facility]    BRONCHOSCOPY (RIGID OR FLEXIBLE), DIAGNOSTIC N/A 8/30/2021    Procedure: BRONCHOSCOPY, WITH BRONCHOALVEOLAR LAVAGE;  Surgeon: Perlman, David Morris, MD;  [facility]: [facility] GI    COLONOSCOPY N/A 4/18/2024    Procedure: COLONOSCOPY, WITH POLYPECTOMY;  Surgeon: Jermaine Root MD;  [facility]    CV CORONARY ANGIOGRAM N/A 5/17/2022    Procedure: Coronary Angiogram;  Surgeon: Jeffrey Gibson MD;  Location:  HEART CARDIAC CATH LAB    CV CORONARY ANGIOGRAM N/A 5/23/2023    Procedure: Coronary Angiogram;  Surgeon: Scout Robins MD;  Location:  HEART CARDIAC CATH LAB    CV HEART BIOPSY N/A 5/13/2021    Procedure: Heart Biopsy;  Surgeon: Scout Robins MD;  Location:  HEART CARDIAC CATH LAB    CV HEART BIOPSY N/A 5/20/2021    Procedure: Heart Biopsy;  Surgeon: Jeffrey Gibson  MD Sue;  Location:  HEART CARDIAC CATH LAB    CV HEART BIOPSY N/A 5/27/2021    Procedure: Heart Biopsy;  Surgeon: Jac Dover MD;  Location:  HEART CARDIAC CATH LAB    CV HEART BIOPSY N/A 6/7/2021    Procedure: CV HEART BIOPSY;  Surgeon: Jeffrey Gibson MD;  Location:  HEART CARDIAC CATH LAB    CV HEART BIOPSY N/A 6/21/2021    Procedure: CV HEART BIOPSY;  Surgeon: Scout Robins MD;  Location:  HEART CARDIAC CATH LAB    CV HEART BIOPSY N/A 7/5/2021    Procedure: CV HEART BIOPSY;  Surgeon: Jeffrey Gibson MD;  Location:  HEART CARDIAC CATH LAB    CV HEART BIOPSY N/A 7/16/2021    Procedure: Heart Biopsy;  Surgeon: Amadeo Art MD;  Location:  HEART CARDIAC CATH LAB    CV HEART BIOPSY N/A 8/5/2021    Procedure: Heart Biopsy;  Surgeon: Jeffrey Gibson MD;  Location:  HEART CARDIAC CATH LAB    CV HEART BIOPSY N/A 8/31/2021    Procedure: Heart Cath Heart Biopsy;  Surgeon: Jeffrey Gibson MD;  Location:  HEART CARDIAC CATH LAB    CV HEART BIOPSY N/A 9/21/2021    Procedure: CV HEART BIOPSY;  Surgeon: Jeffrey Gibson MD;  Location:  HEART CARDIAC CATH LAB    CV HEART BIOPSY N/A 10/5/2021    Procedure: CV HEART BIOPSY;  Surgeon: Jeffrey Gibson MD;  Location:  HEART CARDIAC CATH LAB    CV HEART BIOPSY N/A 11/4/2021    Procedure: CV HEART BIOPSY;  Surgeon: Nicola Seth MD;  Location:  HEART CARDIAC CATH LAB    CV HEART BIOPSY N/A 5/17/2022    Procedure: Heart Biopsy;  Surgeon: Jeffrey iGbson MD;  Location:  HEART CARDIAC CATH LAB    CV HEART BIOPSY N/A 5/23/2023    Procedure: Heart Biopsy;  Surgeon: Scout Robins MD;  Location:  HEART CARDIAC CATH LAB    CV HEART BIOPSY N/A 5/23/2024    Procedure: Heart Biopsy;  Surgeon: Precious Bautista MD;  Location:  HEART CARDIAC CATH LAB    CV RIGHT HEART CATH MEASUREMENTS RECORDED N/A 07/24/2019    Procedure: CV  RIGHT HEART CATH;  Surgeon: Renu Sears MD;  [facility]    CV RIGHT HEART CATH MEASUREMENTS RECORDED N/A 08/05/2020    Procedure: CV RIGHT HEART CATH;  Surgeon: Nicola Seth MD;  Location:  HEART CARDIAC CATH LAB    CV RIGHT HEART CATH MEASUREMENTS RECORDED N/A 01/07/2021    Procedure: CV RIGHT HEART CATH;  Surgeon: Jac Dover MD;  Location:  HEART CARDIAC CATH LAB    CV RIGHT HEART CATH MEASUREMENTS RECORDED N/A 02/23/2021    Procedure: Heart Cath Right Heart Cath;  Surgeon: Jeffrey Gibson MD;  Location:  HEART CARDIAC CATH LAB    CV RIGHT HEART CATH MEASUREMENTS RECORDED N/A 03/23/2021    Procedure: Heart Cath Right Heart Cath. request for 3/23;  Surgeon: Jeffrey Gibson MD;  Location:  HEART CARDIAC CATH LAB    CV RIGHT HEART CATH MEASUREMENTS RECORDED N/A 5/13/2021    Procedure: Right Heart Cath;  Surgeon: Scout Robins MD;  Location:  HEART CARDIAC CATH LAB    CV RIGHT HEART CATH MEASUREMENTS RECORDED N/A 5/20/2021    Procedure: Right Heart Cath;  Surgeon: Jeffrey Gibson MD;  Location:  HEART CARDIAC CATH LAB    CV RIGHT HEART CATH MEASUREMENTS RECORDED N/A 5/27/2021    Procedure: Right Heart Cath;  Surgeon: Jac Dover MD;  Location:  HEART CARDIAC CATH LAB    CV RIGHT HEART CATH MEASUREMENTS RECORDED N/A 6/7/2021    Procedure: CV RIGHT HEART CATH;  Surgeon: Jeffrey Gibson MD;  Location:  HEART CARDIAC CATH LAB    CV RIGHT HEART CATH MEASUREMENTS RECORDED N/A 6/21/2021    Procedure: CV RIGHT HEART CATH;  Surgeon: Scout Robins MD;  Location:  HEART CARDIAC CATH LAB    CV RIGHT HEART CATH MEASUREMENTS RECORDED N/A 7/5/2021    Procedure: CV RIGHT HEART CATH;  Surgeon: Jeffrey Gibson MD;  Location:  HEART CARDIAC CATH LAB    CV RIGHT HEART CATH MEASUREMENTS RECORDED N/A 7/16/2021    Procedure: Right Heart Cath;  Surgeon: Amadeo Art MD;   Location:  HEART CARDIAC CATH LAB    CV RIGHT HEART CATH MEASUREMENTS RECORDED N/A 8/5/2021    Procedure: CV RIGHT HEART CATH;  Surgeon: Jeffrey Gibson MD;  Location:  HEART CARDIAC CATH LAB    CV RIGHT HEART CATH MEASUREMENTS RECORDED N/A 8/31/2021    Procedure: Heart Cath Right Heart Cath;  Surgeon: Jeffrey Gibson MD;  Location:  HEART CARDIAC CATH LAB    CV RIGHT HEART CATH MEASUREMENTS RECORDED N/A 9/21/2021    Procedure: CV RIGHT HEART CATH;  Surgeon: Jeffrey Gibson MD;  Location:  HEART CARDIAC CATH LAB    CV RIGHT HEART CATH MEASUREMENTS RECORDED N/A 10/5/2021    Procedure: CV RIGHT HEART CATH;  Surgeon: Jeffrey Gibson MD;  Location:  HEART CARDIAC CATH LAB    CV RIGHT HEART CATH MEASUREMENTS RECORDED N/A 11/4/2021    Procedure: CV RIGHT HEART CATH;  Surgeon: Nicola Seth MD;  Location:  HEART CARDIAC CATH LAB    CV RIGHT HEART CATH MEASUREMENTS RECORDED N/A 5/17/2022    Procedure: Right Heart Catheterization;  Surgeon: Jeffrey Gibson MD;  Location:  HEART CARDIAC CATH LAB    CV RIGHT HEART CATH MEASUREMENTS RECORDED N/A 5/23/2023    Procedure: Right Heart Catheterization;  Surgeon: Scout Robins MD;  Location:  HEART CARDIAC CATH LAB    CV RIGHT HEART CATH MEASUREMENTS RECORDED N/A 5/23/2024    Procedure: Right Heart Cath- pre noon with rush path;  Surgeon: Precious Bautista MD;  Location:  HEART CARDIAC CATH LAB    GI SURGERY  2003    [facility]    INSERT VENTRICULAR ASSIST DEVICE LEFT (HEARTMATE II) N/A 06/19/2017    Procedure: INSERT VENTRICULAR ASSIST DEVICE LEFT (HEARTMATE II);  Median Sternotomy Heartmate II Left Ventricular Assist Device Insertion on Pump Oxygenator;  Surgeon: Ronnie Quigley MD;  [facility]    IR CHEST TUBE PLACEMENT NON-TUNNELED RIGHT  7/11/2021    LAPAROSCOPY DIAGNOSTIC (GENERAL) N/A 1/4/2024    Procedure: Diagnostic Laparoscopy, Exploratory Laparotomy with Extensive lysis  of adhesions.;  Surgeon: Frederick Montgomery MD;  [facility]    ORTHOPEDIC SURGERY  1994    right knee wired    PICC DOUBLE LUMEN PLACEMENT Right 09/23/2020    5FR PICC DL. Length-43cm (1cm out).    PICC INSERTION - DOUBLE LUMEN Right 05/09/2021    IK/BRACH    RELEASE CARPAL TUNNEL BILATERAL Bilateral 02/18/2021    Procedure: Bilateral carpal tunnel release;  Surgeon: Jermaine Brand MD;  [facility]    TRANSPLANT HEART RECIPIENT N/A 05/05/2021    Procedure: Redo median sternotomy, lysis of adhesions, heart transplant recipient, on cardiopulmonary bypass, intraoperative transesophageal echocardiogram per anesthesia, Implantable Cardioverter Defibrillator (ICD) removal;  Surgeon: Ronnie Quigley MD;  [facility]     acetaminophen (TYLENOL) 325 MG tablet  albuterol (PROAIR HFA/PROVENTIL HFA/VENTOLIN HFA) 108 (90 Base) MCG/ACT inhaler  allopurinol (ZYLOPRIM) 300 MG tablet  bumetanide (BUMEX) 1 MG tablet  buPROPion (WELLBUTRIN XL) 300 MG 24 hr tablet  calcium carbonate (TUMS) 500 MG chewable tablet  cyanocobalamin (VITAMIN B-12) 1000 MCG tablet  diphenhydrAMINE (BENADRYL) 25 MG tablet  gabapentin (NEURONTIN) 300 MG capsule  ipratropium - albuterol 0.5 mg/2.5 mg/3 mL (DUONEB) 0.5-2.5 (3) MG/3ML neb solution  Melatonin 10 MG CAPS  montelukast (SINGULAIR) 10 MG tablet  mycophenolate (GENERIC EQUIVALENT) 250 MG capsule  polyethylene glycol (MIRALAX) 17 GM/Dose powder  rosuvastatin (CRESTOR) 10 MG tablet  tacrolimus (GENERIC EQUIVALENT) 0.5 MG capsule  tacrolimus (GENERIC EQUIVALENT) 1 MG capsule  traMADol (ULTRAM) 50 MG tablet  Vitamin D, Cholecalciferol, 25 MCG (1000 UT) CAPS  warfarin ANTICOAGULANT (COUMADIN) 2.5 MG tablet      Allergies   Allergen Reactions    Grass Shortness Of Breath    Ace Inhibitors Cough    Cats     Dust Mites Other (See Comments)     Asthma    Mold Other (See Comments)     Asthma    Penicillins Other (See Comments)     Unknown - childhood exposure    Tolerated Zosyn 9/18-9/20/2020    Per  "patient report he has tolerated amoxicillin    Sulfa Antibiotics Other (See Comments) and Unknown     Unknown childhood reaction     Family History  Family History   Problem Relation Age of Onset    Cerebrovascular Disease Mother 64    Diabetes Mother     Hypertension Mother     Coronary Artery Disease Father     Diabetes Type 2  Father     Obesity Brother     Obesity Brother     Cerebrovascular Disease Daughter 40     Social History   Social History     Tobacco Use    Smoking status: Former     Current packs/day: 0.00     Types: Cigarettes     Quit date:      Years since quittin.5     Passive exposure: Never    Smokeless tobacco: Never   Vaping Use    Vaping status: Never Used   Substance Use Topics    Alcohol use: Yes     Comment: on occasion    Drug use: No     Comment: tried gummy for anxiety      A medically appropriate review of systems was performed with pertinent positives and negatives noted in the HPI, and all other systems negative.    Physical Exam   BP: 111/70  Pulse: 111  Temp: 97.9  F (36.6  C)  Resp: 18  Height: 172.7 cm (5' 8\")  Weight: 93.4 kg (206 lb)  SpO2: 100 %  Physical Exam  Vitals and nursing note reviewed.   Constitutional:       General: He is not in acute distress.     Appearance: Normal appearance. He is well-developed. He is not toxic-appearing or diaphoretic.   HENT:      Head: Normocephalic.      Comments: Patient right parietal area of small C-shaped subcutaneous laceration bleeding controlled.  Total length is approximately 1 cm.  No step-off deformity noted no other injuries noted negative forman signs no otorhinorrhea.     Right Ear: Tympanic membrane normal.      Left Ear: Tympanic membrane normal.      Nose: Nose normal. No congestion.      Mouth/Throat:      Mouth: Mucous membranes are moist.      Pharynx: Oropharynx is clear.      Comments: Did not bite tongue  Eyes:      General: No scleral icterus.     Extraocular Movements: Extraocular movements intact.      " Conjunctiva/sclera: Conjunctivae normal.      Pupils: Pupils are equal, round, and reactive to light.   Cardiovascular:      Rate and Rhythm: Normal rate and regular rhythm.      Heart sounds: Normal heart sounds.   Pulmonary:      Effort: Pulmonary effort is normal. No respiratory distress.      Breath sounds: Normal breath sounds. No stridor.   Chest:      Chest wall: No tenderness.   Abdominal:      General: Abdomen is flat.      Palpations: Abdomen is soft.      Tenderness: There is no abdominal tenderness. There is no guarding.   Musculoskeletal:         General: No tenderness or deformity. Normal range of motion.      Cervical back: Normal range of motion and neck supple. No rigidity.   Lymphadenopathy:      Cervical: No cervical adenopathy.   Skin:     General: Skin is warm and dry.      Capillary Refill: Capillary refill takes less than 2 seconds.      Findings: No rash.   Neurological:      General: No focal deficit present.      Mental Status: He is alert and oriented to person, place, and time.      Cranial Nerves: No cranial nerve deficit.      Sensory: No sensory deficit.      Coordination: Coordination normal.   Psychiatric:      Comments: Appropriate in the ER.           ED Course, Procedures, & Data        Records are in epic.  Patient history of heart transplant.  Patient is on Coumadin as noted.  Patient with chronic kidney disease being worked up for kidney transplant also.  Medication reviewed allergies reviewed.  Patient been vitally stable throughout the ER course continue monitor.  Had EKG done reviewed noted below without any significant changes although questional accelerated junctional slightly over 100.  Chest x-ray personally viewed by myself 2 view revealed minimal effusion otherwise no pneumothorax post transplant findings noted  Head CT done initially revealed no acute bleeding noted some chronic changes small vessel disease seen no fractures  Patient informed of results.  Patient's  labs reveal a white count of 4.6 hemoglobin is 10.5.  Platelets 182.  Urinalysis without significant findings  Sodium 139 potassium 4.7.  Bicarb 21 gap is 16 BUN is 90 creatinine is 4.757 baseline for patient recently calcium is 8 glucose 219 LFTs within normal limits.  INR 2.36  BNP is 40,000 troponin delta x 2 around 170 without change.  Slightly higher from previous discussed with cardiology also reviewed findings etc. feel at this point more likely related to underlying kidney disease and with no elevation of the troponin feel less likely patient had recent workup with angiogram etc. for the kidney transplant which did not show any significant disease feel this is baseline patient feels comfortable this plan.    Patient able to eat here in the ER.  We did clean patient's laceration on the right lateral parietal area bleeding controlled fairly approximated patient did not want any sutures etc. bacitracin dressing applied.  Feel at this point more likely patient had a postural event more like orthostatic as he got up very rapidly.  No other major injuries noted at this point.  Repeat head CT 6 hours later was signed out to evening physician if this is negative cardiology feels comfortable that she does not need a Zio patch otherwise recently had 1 patient agrees patient we discharged home with head injury instructions etc.  Signed out to evening physician.  Patient here with his wife also who does agree.      Procedures            EKG Interpretation:      Interpreted by Robby Logan MD  Time reviewed: 1223  Symptoms at time of EKG: Syncope   Rhythm: accelerated junctional rhythm.   Rate: 111 BPM  Axis: Right superior axis deviation  Ectopy: none  Conduction: Incomplete RBBB  ST Segments/ T Waves: No ST-T wave changes  Q Waves: none  Comparison to prior: No old EKG available    Clinical Impression: Abnormal EKG            Results for orders placed or performed during the hospital encounter of 07/05/24   XR  Chest 2 Views     Status: None    Narrative    EXAM: XR CHEST 2 VIEWS  7/5/2024 12:45 PM      HISTORY: syncope    COMPARISON: 5/20/2024    FINDINGS: Two views of the chest. Post surgical changes in the chest  with unchanged appearance of the median sternotomy wires including  possible fracture of the superiormost wire. Stable position of the  abandoned implantable cardiac defibrillator lead. Trachea is midline.  Cardiac silhouette is within normal limits. No focal consolidation.  Trace bilateral pleural effusions. No pneumothorax.      Impression    IMPRESSION: Trace bilateral pleural effusions. No focal airspace  disease.    I have personally reviewed the examination and initial interpretation  and I agree with the findings.    MICHAEL FERNANDEZ MD         SYSTEM ID:  [id number]   CT Head w/o Contrast     Status: None    Narrative    EXAM: CT HEAD W/O CONTRAST  7/5/2024 12:12 PM     HISTORY:  syncope right side parietal injury on coumadin       COMPARISON:  5/24/2024    TECHNIQUE: Using multidetector thin collimation helical acquisition  technique, axial, coronal and sagittal CT images from the skull base  to the vertex were obtained without intravenous contrast.   (topogram) image(s) also obtained and reviewed.    FINDINGS:  Old lacunar infarct in the posterior left lentiform nucleus. Patchy  areas of hypoattenuation in the periventricular and subcortical white  matter, nonspecific, but likely represents chronic small vessel  ischemic disease.    No acute intracranial hemorrhage, mass effect, or midline shift. No  acute loss of gray-white matter differentiation in the cerebral  hemispheres. The ventricles are proportionate to the cerebral sulci.  Clear basal cisterns.    The bony calvaria in the bones of the skull base are normal. The  visualized portions of the paranasal sinuses and mastoid air cells are  clear. Bilateral pseudophakia.         Impression    IMPRESSION:     1. No acute intracranial pathology.  2.  Patchy hypoattenuation within the periventricular white matter is  nonspecific, but likely represents chronic small vessel ischemic  disease in a patient this age.    I have personally reviewed the examination and initial interpretation  and I agree with the findings.    SHEY SOTOMAYOR MD         SYSTEM ID:  [id number]   CT Head w/o Contrast     Status: None    Narrative    EXAM: CT HEAD W/O CONTRAST  7/5/2024 7:35 PM     HISTORY: Repeat ct after head injury on coumadin.     COMPARISON:  7 hours prior    TECHNIQUE: Using multidetector thin collimation helical acquisition  technique, axial, coronal and sagittal CT images from the skull base  to the vertex were obtained without intravenous contrast.   (topogram) image(s) also obtained and reviewed.    FINDINGS:  Old lacunar infarct in the posterior left lentiform nucleus. Patchy  areas of hypoattenuation in the periventricular and subcortical white  matter, nonspecific, but likely represents chronic small vessel  ischemic disease.    No acute intracranial hemorrhage, mass effect, or midline shift. No  acute loss of gray-white matter differentiation in the cerebral  hemispheres. The ventricles are proportionate to the cerebral sulci.  Clear basal cisterns.    The bony calvaria in the bones of the skull base are normal. The  visualized portions of the paranasal sinuses and mastoid air cells are  clear. Bilateral pseudophakia. Right parietal scalp swelling.      Impression    IMPRESSION:   1. No acute intracranial pathology.  2. White matter changes of likely chronic small vessel ischemic  disease. Chronic lacunar infarct in the left basal ganglia.  3. Right parietal scalp swelling.    I have personally reviewed the examination and initial interpretation  and I agree with the findings.    ERIC HERNANDEZ MD         SYSTEM ID:  [id number]   Partial thromboplastin time     Status: Normal   Result Value Ref Range    aPTT 35 22 - 38 Seconds   INR     Status: Abnormal    Result Value Ref Range    INR 2.36 (H) 0.85 - 1.15   Comprehensive metabolic panel     Status: Abnormal   Result Value Ref Range    Sodium 139 135 - 145 mmol/L    Potassium 4.7 3.4 - 5.3 mmol/L    Carbon Dioxide (CO2) 21 (L) 22 - 29 mmol/L    Anion Gap 16 (H) 7 - 15 mmol/L    Urea Nitrogen 90.3 (H) 8.0 - 23.0 mg/dL    Creatinine 4.75 (H) 0.67 - 1.17 mg/dL    GFR Estimate 13 (L) >60 mL/min/1.73m2    Calcium 8.0 (L) 8.8 - 10.2 mg/dL    Chloride 102 98 - 107 mmol/L    Glucose 219 (H) 70 - 99 mg/dL    Alkaline Phosphatase 89 40 - 150 U/L    AST 31 0 - 45 U/L    ALT 20 0 - 70 U/L    Protein Total 6.8 6.4 - 8.3 g/dL    Albumin 4.1 3.5 - 5.2 g/dL    Bilirubin Total 0.5 <=1.2 mg/dL   Magnesium     Status: Normal   Result Value Ref Range    Magnesium 2.2 1.7 - 2.3 mg/dL   Troponin T, High Sensitivity     Status: Abnormal   Result Value Ref Range    Troponin T, High Sensitivity 178 (HH) <=22 ng/L   TSH     Status: Normal   Result Value Ref Range    TSH 4.13 0.30 - 4.20 uIU/mL   Nt probnp inpatient (BNP)     Status: Abnormal   Result Value Ref Range    N terminal Pro BNP Inpatient 40,442 (H) 0 - 900 pg/mL   UA with Microscopic reflex to Culture     Status: Abnormal    Specimen: Urine, Clean Catch   Result Value Ref Range    Color Urine Light Yellow Colorless, Straw, Light Yellow, Yellow    Appearance Urine Clear Clear    Glucose Urine Negative Negative mg/dL    Bilirubin Urine Negative Negative    Ketones Urine Negative Negative mg/dL    Specific Gravity Urine 1.011 1.003 - 1.035    Blood Urine Negative Negative    pH Urine 5.0 5.0 - 7.0    Protein Albumin Urine Negative Negative mg/dL    Urobilinogen Urine Normal Normal, 2.0 mg/dL    Nitrite Urine Negative Negative    Leukocyte Esterase Urine Negative Negative    Bacteria Urine Few (A) None Seen /HPF    Mucus Urine Present (A) None Seen /LPF    RBC Urine <1 <=2 /HPF    WBC Urine 1 <=5 /HPF    Narrative    Urine Culture not indicated   CBC with platelets and differential      Status: Abnormal   Result Value Ref Range    WBC Count 4.6 4.0 - 11.0 10e3/uL    RBC Count 3.57 (L) 4.40 - 5.90 10e6/uL    Hemoglobin 10.5 (L) 13.3 - 17.7 g/dL    Hematocrit 34.4 (L) 40.0 - 53.0 %    MCV 96 78 - 100 fL    MCH 29.4 26.5 - 33.0 pg    MCHC 30.5 (L) 31.5 - 36.5 g/dL    RDW 15.4 (H) 10.0 - 15.0 %    Platelet Count 182 150 - 450 10e3/uL    % Neutrophils 66 %    % Lymphocytes 21 %    % Monocytes 9 %    % Eosinophils 2 %    % Basophils 1 %    % Immature Granulocytes 1 %    NRBCs per 100 WBC 0 <1 /100    Absolute Neutrophils 3.1 1.6 - 8.3 10e3/uL    Absolute Lymphocytes 1.0 0.8 - 5.3 10e3/uL    Absolute Monocytes 0.4 0.0 - 1.3 10e3/uL    Absolute Eosinophils 0.1 0.0 - 0.7 10e3/uL    Absolute Basophils 0.0 0.0 - 0.2 10e3/uL    Absolute Immature Granulocytes 0.0 <=0.4 10e3/uL    Absolute NRBCs 0.0 10e3/uL   Troponin T, High Sensitivity     Status: Abnormal   Result Value Ref Range    Troponin T, High Sensitivity 175 (HH) <=22 ng/L   EKG 12-lead, tracing only     Status: None   Result Value Ref Range    Systolic Blood Pressure  mmHg    Diastolic Blood Pressure  mmHg    Ventricular Rate 111 BPM    Atrial Rate 104 BPM    OH Interval  ms    QRS Duration 106 ms     ms    QTc 631 ms    P Axis  degrees    R AXIS 231 degrees    T Axis 75 degrees    Interpretation ECG       Accelerated Junctional rhythm  Right superior axis deviation  Incomplete right bundle branch block  Possible Right ventricular hypertrophy  Cannot rule out Anterior infarct , age undetermined  Abnormal ECG     CBC with platelets differential     Status: Abnormal    Narrative    The following orders were created for panel order CBC with platelets differential.  Procedure                               Abnormality         Status                     ---------                               -----------         ------                     CBC with platelets and d...[[id number]]  Abnormal            Final result                 Please view results for  these tests on the individual orders.     Medications   lidocaine 1 % 0.1-1 mL (has no administration in time range)   lidocaine (LMX4) cream (has no administration in time range)   sodium chloride (PF) 0.9% PF flush 3 mL (3 mLs Intracatheter $Given 7/5/24 1217)   sodium chloride (PF) 0.9% PF flush 3 mL (has no administration in time range)   sodium chloride 0.9% BOLUS 250 mL (0 mLs Intravenous Stopped 7/5/24 1320)     Labs Ordered and Resulted from Time of ED Arrival to Time of ED Departure   INR - Abnormal       Result Value    INR 2.36 (*)    COMPREHENSIVE METABOLIC PANEL - Abnormal    Sodium 139      Potassium 4.7      Carbon Dioxide (CO2) 21 (*)     Anion Gap 16 (*)     Urea Nitrogen 90.3 (*)     Creatinine 4.75 (*)     GFR Estimate 13 (*)     Calcium 8.0 (*)     Chloride 102      Glucose 219 (*)     Alkaline Phosphatase 89      AST 31      ALT 20      Protein Total 6.8      Albumin 4.1      Bilirubin Total 0.5     TROPONIN T, HIGH SENSITIVITY - Abnormal    Troponin T, High Sensitivity 178 (*)    NT PROBNP INPATIENT - Abnormal    N terminal Pro BNP Inpatient 40,442 (*)    ROUTINE UA WITH MICROSCOPIC REFLEX TO CULTURE - Abnormal    Color Urine Light Yellow      Appearance Urine Clear      Glucose Urine Negative      Bilirubin Urine Negative      Ketones Urine Negative      Specific Gravity Urine 1.011      Blood Urine Negative      pH Urine 5.0      Protein Albumin Urine Negative      Urobilinogen Urine Normal      Nitrite Urine Negative      Leukocyte Esterase Urine Negative      Bacteria Urine Few (*)     Mucus Urine Present (*)     RBC Urine <1      WBC Urine 1     CBC WITH PLATELETS AND DIFFERENTIAL - Abnormal    WBC Count 4.6      RBC Count 3.57 (*)     Hemoglobin 10.5 (*)     Hematocrit 34.4 (*)     MCV 96      MCH 29.4      MCHC 30.5 (*)     RDW 15.4 (*)     Platelet Count 182      % Neutrophils 66      % Lymphocytes 21      % Monocytes 9      % Eosinophils 2      % Basophils 1      % Immature  Granulocytes 1      NRBCs per 100 WBC 0      Absolute Neutrophils 3.1      Absolute Lymphocytes 1.0      Absolute Monocytes 0.4      Absolute Eosinophils 0.1      Absolute Basophils 0.0      Absolute Immature Granulocytes 0.0      Absolute NRBCs 0.0     TROPONIN T, HIGH SENSITIVITY - Abnormal    Troponin T, High Sensitivity 175 (*)    PARTIAL THROMBOPLASTIN TIME - Normal    aPTT 35     MAGNESIUM - Normal    Magnesium 2.2     TSH - Normal    TSH 4.13       CT Head w/o Contrast   Final Result   IMPRESSION:    1. No acute intracranial pathology.   2. White matter changes of likely chronic small vessel ischemic   disease. Chronic lacunar infarct in the left basal ganglia.   3. Right parietal scalp swelling.      I have personally reviewed the examination and initial interpretation   and I agree with the findings.      ERIC HERNANDEZ MD            SYSTEM ID:  [id number]      XR Chest 2 Views   Final Result   IMPRESSION: Trace bilateral pleural effusions. No focal airspace   disease.      I have personally reviewed the examination and initial interpretation   and I agree with the findings.      MICHAEL FERNANDEZ MD            SYSTEM ID:  [id number]      CT Head w/o Contrast   Final Result   IMPRESSION:       1. No acute intracranial pathology.   2. Patchy hypoattenuation within the periventricular white matter is   nonspecific, but likely represents chronic small vessel ischemic   disease in a patient this age.      I have personally reviewed the examination and initial interpretation   and I agree with the findings.      SHEY SOTOMAYOR MD            SYSTEM ID:  [id number]             Critical care was not performed.     Medical Decision Making  The patient's presentation was of high complexity (an acute health issue posing potential threat to life or bodily function).    The patient's evaluation involved:  review of external note(s) from 3+ sources (see separate area of note for details)  review of 3+ test result(s) ordered  prior to this encounter (see separate area of note for details)  ordering and/or review of 3+ test(s) in this encounter (see separate area of note for details)  discussion of management or test interpretation with another health professional (see separate area of note for details)    The patient's management necessitated high risk (a decision regarding hospitalization).    Assessment & Plan   69 male history of heart transplant who is on Coumadin he got up quickly to go the bathroom and fainted.  Unknown if patient was out for 5 to 10 minutes very unclear at this point no reports of being postictal he did note some urination when he woke up that he was urinating himself but did not bite his tongue no reports of seizure at this point.  Patient had a scalp laceration to the right lateral parietal area that was cleaned and patient declined any sutures well reapproximated with bleeding controlled.  Bacitracin dressing applied to this.  CT head initially done did not show any acute fracture bleeding repeat 6 hours later was signed out to evening physician to follow-up on.  Patient otherwise been stable labs drawn reviewed also EKG etc. reviewed by cardiology staff.  Troponins are stable but elevated 170s but we may be reflective of his ongoing kidney disease patient had recent cardiac workup that they feel is appropriate and do not think any significant coronary disease had a recent Zio patch also was negative seizure more postural when he got up very quickly that most likely orthostatic syncope versus any other cause.  At this point patient's BNP is 40,000 but his renal functions kidney issues have been ongoing and certainly could just be reflective of that.  Patient otherwise been stable here reassessed multiple times will get a repeat head CT if this is negative will be discharged home with wife head injury instructions and to follow-up with cardiology return if any worsening symptoms at all patient agrees with plan  signed out to evening physician.       I have reviewed the nursing notes. I have reviewed the findings, diagnosis, plan and need for follow up with the patient.    Discharge Medication List as of 7/5/2024  8:26 PM          Final diagnoses:   Syncope, unspecified syncope type   Injury of head, initial encounter   Laceration of head without foreign body, unspecified part of head, initial encounter       IDaljit, am serving as a trained medical scribe to document services personally performed by Robby Logan MD based on the provider's statements to me on July 5, 2024.  This document has been checked and approved by the attending provider.    IRobby MD, was physically present and have reviewed and verified the accuracy of this note documented by Daljit Benitez medical scribe.      Robby Logan MD  [facility] EMERGENCY DEPARTMENT  7/5/2024    This note was created at least in part by the use of dragon voice dictation system. Inadvertent typographical errors may still exist.  Robby Logan MD.  Patient evaluated in the emergency department during the COVID-19 pandemic period. Careful attention to patients safety was addressed throughout the evaluation. Evaluation and treatment management was initiated with disposition made efficiently and appropriate as possible to minimize any risk of potential exposure to patient during this evaluation.             Robby Logan MD  07/05/24 9619

## 2024-07-05 NOTE — TELEPHONE ENCOUNTER
Patient called to report that he got out of bed to use the restroom this AM but then woke up on the floor near the end of his bed and discovered that he was incontinent of urine and that he had hit his head. Patient is not sure what time he initially got out of bed but he reports that he got up off of the floor less than 1-hour ago. He has a small cut on his head that is not bleeding. He denies headache, dizziness, and vision changes. He has not checked his BP today. Patient is on warfarin. INR on 7/2 was 2.68.     Advised patient to present to the emergency room for further evaluation of syncopal episode and after hitting his head while on a blood thinner. Patient verbalized understanding. His wife is at home with him and will drive him to [facility] ED.

## 2024-07-05 NOTE — ED TRIAGE NOTES
Transplant coordinator calling in to report pt coming in after syncopal episode. Hit his head. On warfarin.

## 2024-07-05 NOTE — ED TRIAGE NOTES
Pt with hx of heart transplant in '21 sent to ED for evaluation of syncopal episode this morning.  Pt  Was getting up to use the restroom, feels he got up to fast, and when he came to he was on the floor and had lost control of his bladder.  Pt hit his head.  On coumadin.  Feels back to baseline now.     Triage Assessment (Adult)       Row Name 07/05/24 1131          Triage Assessment    Airway WDL WDL        Respiratory WDL    Respiratory WDL WDL        Skin Circulation/Temperature WDL    Skin Circulation/Temperature WDL WDL        Cardiac WDL    Cardiac WDL WDL        Peripheral/Neurovascular WDL    Peripheral Neurovascular WDL WDL        Cognitive/Neuro/Behavioral WDL    Cognitive/Neuro/Behavioral WDL WDL

## 2024-07-06 NOTE — DISCHARGE INSTRUCTIONS
Thank you for coming to the [facility]. Your scans are reassuring and show no intracranial injury from your fall. Please continue to follow-up closely with your cardiologist. Return to the ED if you have another fainting episode or any other concerning symptoms.

## 2024-07-06 NOTE — ED PROVIDER NOTES
I took signout on the patient from Dr. Logan.  This is a 69M with what appears to be orthostatic syncope. External head lacs. First head CT negative. Getting 6 hour surveillance CT, OK to discharge if negative. Case discussed with [facility] staff, say OK for discharge from their perspective per Dr. Logan.    Repeat CT negative. When entering room to discuss with patient he immediately asked, in good spirits, if we were sending him home. Discussed wound care, outpatient follow-up, and return precautions.     Parviz Clemons MD  07/05/24 2013

## 2024-07-07 NOTE — PROGRESS NOTES
[facility]  [facility] [place] [address]  Phone: [phone], Fax: [phone]    Outpatient Visit Note:    Patient: Jim Willingham  MRN: [id number]  : 1955  DEMETRICE: 2024    History of Present Illness:  Jim Willingham is a 69 year old man with a history of nonischemic cardiomyopathy s/p OHT May 2021, possible PE (May 2024, on warfarin) referred to hematology for further evaluation.    He presented to the ER in May with dyspnea and fatigue.  Appears that he had an WALTER and volume overload.  He underwent a VQ scan which showed a wedge-shaped infarct in the right lower lobe concerning for pulmonary embolism.  Started on warfarin due to high cost of DOAC.    Lower extremity ultrasound on May 20 showed no evidence of DVT.  VQ scan showed segmental appearing perfusion defects involving the right lower lobe without corresponding consolidation concerning for pulmonary embolism.    Reportedly his symptoms began approximately 10 days prior to admission.  His main complaint was dyspnea and fatigue.  Ordinarily does not have exertional limits, but at that point could not ambulate across the room without dyspnea.  Also with a 15 pound weight gain.  He was reported to be volume overloaded despite adequate graft function.  Had an acute WALTER as well.  Labs for AL amyloidosis ordered and unremarkable.    On further review of records, there did not appear to be any concern about the function of his cardiac graft.  A D-dimer was performed which motivated the VQ scan given the magnitude of the WALTER precluding contrast from a CT scan.    Fell the other day and hit his head. No prior history of thrombosis.  Notes no travel, procedures, immobility, etc. around the time of the possible PE.    Medications:  Current Outpatient Medications   Medication Sig Dispense Refill    acetaminophen (TYLENOL) 325 MG tablet Take 3 tablets (975 mg) by mouth every 8 hours as needed for  mild pain 100 tablet 0    albuterol (PROAIR HFA/PROVENTIL HFA/VENTOLIN HFA) 108 (90 Base) MCG/ACT inhaler Inhale 2 puffs into the lungs every 4 hours as needed for shortness of breath, wheezing or cough 40.2 g 2    allopurinol (ZYLOPRIM) 300 MG tablet TAKE 1 TABLET BY MOUTH DAILY 100 tablet 3    bumetanide (BUMEX) 1 MG tablet Take 3 tablets (3 mg) by mouth daily 540 tablet 3    buPROPion (WELLBUTRIN XL) 300 MG 24 hr tablet Take 1 tablet (300 mg) by mouth every morning 90 tablet 1    calcium carbonate (TUMS) 500 MG chewable tablet Take 2 tablets (1,000 mg) by mouth 3 times daily (with meals) 300 tablet 3    cyanocobalamin (VITAMIN B-12) 1000 MCG tablet Take 1 tablet (1,000 mcg) by mouth daily (Patient not taking: Reported on 6/27/2024) 90 tablet 3    diphenhydrAMINE (BENADRYL) 25 MG tablet Take 25 mg by mouth every 6 hours as needed for itching      gabapentin (NEURONTIN) 300 MG capsule Take 300 mg by mouth daily      ipratropium - albuterol 0.5 mg/2.5 mg/3 mL (DUONEB) 0.5-2.5 (3) MG/3ML neb solution Take 1 vial by nebulization 4 times daily as needed for shortness of breath, wheezing or cough      Melatonin 10 MG CAPS Take 1 capsule by mouth nightly as needed      montelukast (SINGULAIR) 10 MG tablet TAKE 1 TABLET BY MOUTH AT  BEDTIME 100 tablet 3    mycophenolate (GENERIC EQUIVALENT) 250 MG capsule Take 2 capsules (500 mg) by mouth 2 times daily 180 capsule 3    polyethylene glycol (MIRALAX) 17 GM/Dose powder Take 1 Capful by mouth daily as needed for constipation      rosuvastatin (CRESTOR) 10 MG tablet Take 1 tablet (10 mg) by mouth daily 90 tablet 0    tacrolimus (GENERIC EQUIVALENT) 0.5 MG capsule Take 1 capsule (0.5 mg) by mouth every morning With 3 mg capsules 90 capsule 3    tacrolimus (GENERIC EQUIVALENT) 1 MG capsule TAKE 3 CAPSULES BY MOUTh IN am and in pm.      traMADol (ULTRAM) 50 MG tablet TAKE ONE TABLET BY MOUTH EVERY MORNING AND AFTERNOON AND 2 AT BEDTIME AS NEEDED FOR PAIN      Vitamin D,  Cholecalciferol, 25 MCG (1000 UT) CAPS Take 25 mcg by mouth daily 90 capsule 3    warfarin ANTICOAGULANT (COUMADIN) 2.5 MG tablet Take 2.5 mg daily or as directed by anticoagulation clinic (Patient taking differently: Take 2.5 mg by mouth daily Take 2.5 mg daily or as directed by anticoagulation clinic) 30 tablet 3        Objectives:  N/a, video visit    Assessment:  Jim Willingham is a 69 year old man with a history of heart transplant, cardiorenal syndrome, admitted in May with dyspnea, found to have volume overload in the setting of progressive renal failure, with VQ scan showing PE.    It is not clear to me that his symptoms in May were due to the PE.  I think it is just as likely, if not more likely, that the symptoms were due to the renal failure.  I spoke to radiology, and the VQ scan does seem fairly conclusive for PE.  It is not possible to tell whether a PE is acute or chronic based on the VQ scan.    We may be stuck treating him with anticoagulation indefinitely, as if this were truly an acute PE, it was unprovoked.  I will speak to his cardiologist and see if there is anything about the clinical circumstances at the time of his admission that I am missing.  I would not want to commit him to long-term anticoagulation if it is possible that this clot is chronic and there are other better explanations for his admission in May (and the PE was an incidental diagnosis rather than the driving factor at that time), but again we may not be able to prove this therapy.    I would have a very low threshold to discontinue anticoagulation on him if needed.  If he experiences bleeding, or continues to have falls with head strike (he had 1 recently), I would definitely stop anticoagulation, as again I think there is more than a little ambiguity about whether this was an acute PE.    Finally, I would keep him on warfarin right now given his comfort with this agent (he used it for many years in the past) and his  tenuous renal function.  If his renal function improves, or at least stabilizes, perhaps we could transition to low-dose apixaban.  Would need to look into financial assistance/discount programs for this for him, however.  Additionally, if he begins evaluation for renal transplant, he would likely need to stay on warfarin.    Plan:  - Continue warfarin  - Discuss case with cardiologist  - Follow-up in 3 months    Patient understands and agrees with the above plan and recommendation.    Jacob Cogan, MD  Hematology    60 minutes spent by me on the date of the encounter doing chart review, history and exam, documentation and further activities per the note    The longitudinal plan of care for the diagnosis(es)/condition(s) as documented were addressed during this visit. Due to the added complexity in care, I will continue to support Jim in the subsequent management and with ongoing continuity of care.    This note was completed in part using dictation via the Dragon voice recognition software. Some word and grammatical errors may occur and must be interpreted in the appropriate clinical context. If there are any questions pertaining to this issue, please contact me for further clarification.     Video-Visit Details:  Type of service:  Video Visit  Video Start Time: 11:41 AM  Video End Time (time video stopped): 12:00  Originating Location (pt. Location): Home  Distant Location (provider location):  [facility]   Mode of Communication:  Video Conference via [employer]

## 2024-07-08 NOTE — TELEPHONE ENCOUNTER
Called patient to follow-up after ED visit last week. Patient reports he is feeling well and he denies presyncope/syncope. He has no other concerns today.

## 2024-07-08 NOTE — PROGRESS NOTES
"ANTICOAGULATION MANAGEMENT     Jim Willingham 69 year old male is on warfarin with therapeutic INR result. (Goal INR 2.0-3.0)    Recent labs: (last 7 days)     07/05/24  1143   INR 2.36*       ASSESSMENT     Source(s): Chart Review     Warfarin doses taken: Reviewed in chart  Diet:  CAMILO  Medication/supplement changes:  7/2/24 Bumex increased, no expected Warfarin interaction.  New illness, injury, or hospitalization: Yes: Visited ER 7/5/24 for syncope with head injury - First and repeat head CT's negative. EKG with no significant change. Per chart review, note from today stating  \"Patient reports he is feeling well and he denies presyncope/syncope. He has no other concerns today.\"  Signs or symptoms of bleeding or clotting: None noted in Dr. Cogan's note from virtual visit with patient today.  Previous result: Therapeutic last visit; previously outside of goal range  Additional findings: From [facility] note by Dr. Cogan today: \"We may be stuck treating him with anticoagulation indefinitely, as if this were truly an acute PE, it was unprovoked.  I will speak to his cardiologist and see if there is anything about the clinical circumstances at the time of his admission that I am missing.  I would not want to commit him to long-term anticoagulation if it is possible that this clot is chronic and there are other better explanations for his admission in May (and the PE was an incidental diagnosis rather than the driving factor at that time), but again we may not be able to prove this therapy.    I would have a very low threshold to discontinue anticoagulation on him if needed.  If he experiences bleeding, or continues to have falls with head strike (he had 1 recently), I would definitely stop anticoagulation, as again I think there is more than a little ambiguity about whether this was an acute PE.    Finally, I would keep him on warfarin right now given his comfort with this agent (he used it for many years in the past) " "and his tenuous renal function.  If his renal function improves, or at least stabilizes, perhaps we could transition to low-dose apixaban.  Would need to look into financial assistance/discount programs for this for him, however.  Additionally, if he begins evaluation for renal transplant, he would likely need to stay on warfarin.    Plan:  - Continue warfarin\"    Warfarin refilled today by Dr. Cogan. Instructed Jim to let [facility] know if he has any issues with picking up prescription.       PLAN     Recommended plan for no diet, medication or health factor changes affecting INR     Dosing Instructions: Continue your current warfarin dose with next INR in 1 week       Summary  As of 7/8/2024      Full warfarin instructions:  3.75 mg every Sun, Tue, Thu; 2.5 mg all other days   Next INR check:  7/12/2024               Detailed voice message left for [place] with dosing instructions and follow up date.   Sent [employer] message with dosing and follow up instructions    Lab visit scheduled prior    Education provided: Please call back if any changes to your diet, medications or how you've been taking warfarin  Goal range and lab monitoring: goal range and significance of current result  Symptom monitoring: monitoring for bleeding signs and symptoms  Importance of notifying anticoagulation clinic for: upcoming surgeries and procedures and health changes  Written instructions provided    Plan made per [facility] anticoagulation protocol    Viktoria Ross RN  Anticoagulation Clinic  7/8/2024    _______________________________________________________________________     Anticoagulation Episode Summary       Current INR goal:  2.0-3.0   TTR:  17.6% (1.1 mo)   Target end date:  Indefinite   Send INR reminders to:  [facility]    Indications    Transplanted heart (H) [Z94.1]  Pulmonary embolism (H) [I26.99]  Single subsegmental pulmonary embolism without acute cor pulmonale (H) [I26.93]             Comments:           "     Anticoagulation Care Providers       Provider Role Specialty Phone number    Riaz Montaño MD Referring Cardiovascular Disease [phone]    Rajani Wilder APRN CNP Referring Cardiovascular Disease [phone]

## 2024-07-12 NOTE — TELEPHONE ENCOUNTER
Pt is s/p OHT 05/06/2021 due to NICM. Last seen by Dr. Delisa Montgomery 06/27/2024. Immunos:  mg BID and tac with goal levels 4-6.  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 BMI about 32. Called pt today, reach VM. LM I am calling to talk about proceeding with a pre kidney transplant eval asked him to return my call.

## 2024-07-12 NOTE — LETTER
7/12/2024       RE: Jim Willingham  [address]  [facility] [address]     Dear Colleague,    Thank you for referring your patient, Jim Willingham, to the [facility] [place] at [facility]. Please see a copy of my visit note below.    Nephrology Clinic Visit 7/12/24    Assessment and Plan:    CKD5 w/proteinuria - Creat 4.4, eGFR 14 ml/mn, UPCR 0.4 mg/mgCr    - Jim never recovered to previous baseline and has been running 4-5 since 6/24 with improved volume status     - Etiology for his CKD felt to be CNI   - Renal US 5/24 neg for hydro/masses   - DsDNA, Anca, Complements, KLR and immunofixation normal   - He does have elevated serum oxalate level of 8.6 but that may just be elevated given the WALTER   - Does have elevated PSA but again, no hydro on US   - No NSAIDs, Abx,    - Jim is familiar with dialysis. He has attended the [facility] program and is interested in PD.    - He is agreeable to PD access consult. He does not need to proceed with surgery at this time   - Will have him tour a PD unit. RNCC will help set that up   - He has been referred to transplant    2. Heart transplant 2021 - IS regimen: TAC/MMF. TAC level 5.3. Weight 203# ( was less at home) from 204# last visit. No edema/dyspnea/b/ps teens/  Current regimen: Bumex 3 mg bid   - Will continue for now   - Per Cardiology    3. Pulmonary embolism dx'd 5/24- On Warfarin.     4. Electrolytes - No acute concerns. K 4.3 Na 137    5. Acid base - No acute concerns. Bicarb 24    6. Elevated oxalate level in setting of WALTER and h/o RGBY ( 2003) - Serum oxalate level 8.6 ( 5/24).    - Continue Tums 1000 mg TID w/meals   - Repeat oxalate level in 3 months ( 9/24)    7. Anemia - Hgb 11.4   - Iron studies 5/24: Ferritin 24 Fe 16 IS 5   - Will set him up for IV iron   - Had colonoscopy 4/24 w/tubular polyp   - Not on oral iron but on B12    8. BMD - Ca 8.5 Phos 4.5 albumin  4.2   Vit D 38 ( 5/24)    ( 7/24)   On Vit D 25 mcg every day   Will begin Calcitriol 0.25 mcg qd    9. Gout - Controlled on Allopurinol.  Last Uric acid level 5.3 ( 5/24)    10. Disposition - RTC 9/9/24 for follow up with labs prior    Assessment and plan was discussed with patient and he voiced his understanding and agreement.    Reason for Visit:  CKD5    HPI:  Mr Willingham is a 68 yo male with PMH significant for Heart transplant 2021, CKD, MICHAEL, DM2, RGBY 2003 present today for routine CKD follow up given declining renal function.   Diuretics have been adjusted in the interim and currently on Bumex 3 mg bid with good control of edema/dyspnea  Baseline creat 1.4-1.8 prior to hospital admission in May, now in the 4-5 range since 6/24 with improved volume status    ROS:   Jim reports feeling great today. 2 days ago he was struggling to move he was so tired. His energy seems to wax and wane, but today is a good day  He notes minimal edema  Home b/ps teens/  Denies dyspnea.   No CP  Carries his fluid in his abdomen, even his pannus was edematous, but now resolved  No voiding concerns other than frequency  Appetite is so so    Chronic Health Problems:    Heart transplant 2021  CKD5  MICHAEL  DM2  RGBY 2003  B12 def  COPD  Depression  BPH    Family Hx:   Family History   Problem Relation Age of Onset     Cerebrovascular Disease Mother 64     Diabetes Mother      Hypertension Mother      Coronary Artery Disease Father      Diabetes Type 2  Father      Obesity Brother      Obesity Brother      Cerebrovascular Disease Daughter 40     Personal Hx:   , retired respiratory therapist, 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, NS, ETOH rare    Allergies:  Allergies   Allergen Reactions     Grass Shortness Of Breath     Ace Inhibitors Cough     Cats      Dust Mites Other (See Comments)     Asthma     Mold Other (See Comments)     Asthma     Penicillins Other (See Comments)     Unknown - childhood exposure    Tolerated Zosyn  9/18-9/20/2020    Per patient report he has tolerated amoxicillin     Sulfa Antibiotics Other (See Comments) and Unknown     Unknown childhood reaction       Medications:  Current Outpatient Medications   Medication Sig Dispense Refill     acetaminophen (TYLENOL) 325 MG tablet Take 3 tablets (975 mg) by mouth every 8 hours as needed for mild pain 100 tablet 0     albuterol (PROAIR HFA/PROVENTIL HFA/VENTOLIN HFA) 108 (90 Base) MCG/ACT inhaler Inhale 2 puffs into the lungs every 4 hours as needed for shortness of breath, wheezing or cough 40.2 g 2     allopurinol (ZYLOPRIM) 300 MG tablet TAKE 1 TABLET BY MOUTH DAILY 100 tablet 3     bumetanide (BUMEX) 1 MG tablet Take 3 tablets (3 mg) by mouth 2 times daily       buPROPion (WELLBUTRIN XL) 300 MG 24 hr tablet Take 1 tablet (300 mg) by mouth every morning 90 tablet 1     calcium carbonate (TUMS) 500 MG chewable tablet Take 2 tablets (1,000 mg) by mouth 3 times daily (with meals) 300 tablet 3     cyanocobalamin (VITAMIN B-12) 1000 MCG tablet Take 1 tablet (1,000 mcg) by mouth daily 90 tablet 3     diphenhydrAMINE (BENADRYL) 25 MG tablet Take 25 mg by mouth every 6 hours as needed for itching       gabapentin (NEURONTIN) 300 MG capsule Take 300 mg by mouth daily       ipratropium - albuterol 0.5 mg/2.5 mg/3 mL (DUONEB) 0.5-2.5 (3) MG/3ML neb solution Take 1 vial by nebulization 4 times daily as needed for shortness of breath, wheezing or cough       Melatonin 10 MG CAPS Take 1 capsule by mouth nightly as needed       montelukast (SINGULAIR) 10 MG tablet TAKE 1 TABLET BY MOUTH AT  BEDTIME 100 tablet 3     mycophenolate (GENERIC EQUIVALENT) 250 MG capsule Take 2 capsules (500 mg) by mouth 2 times daily 180 capsule 3     polyethylene glycol (MIRALAX) 17 GM/Dose powder Take 1 Capful by mouth daily as needed for constipation       rosuvastatin (CRESTOR) 10 MG tablet Take 1 tablet (10 mg) by mouth daily 90 tablet 0     tacrolimus (GENERIC EQUIVALENT) 0.5 MG capsule Take 1  capsule (0.5 mg) by mouth every morning With 3 mg capsules 90 capsule 3     tacrolimus (GENERIC EQUIVALENT) 1 MG capsule TAKE 3 CAPSULES BY MOUTh IN am and in pm.       traMADol (ULTRAM) 50 MG tablet TAKE ONE TABLET BY MOUTH EVERY MORNING AND AFTERNOON AND 2 AT BEDTIME AS NEEDED FOR PAIN       Vitamin D, Cholecalciferol, 25 MCG (1000 UT) CAPS Take 25 mcg by mouth daily 90 capsule 3     warfarin ANTICOAGULANT (COUMADIN) 2.5 MG tablet Take 2.5 mg daily or as directed by anticoagulation clinic 30 tablet 3     No current facility-administered medications for this visit.      Vitals:  /76   Pulse 108   Wt 92.1 kg (203 lb)   SpO2 100%   BMI 30.87 kg/m      Exam:  GENERAL APPEARANCE: Pleasant male in NAD   RESP: lungs clear to auscultation  CV: tachy  EDEMA: no LE edema bilaterally  ABDOMEN: soft, nondistended, nontender  MS: extremities normal - no gross deformities noted  SKIN: no visible rash  Neuro: A/O    LABS:   CMP  Recent Labs   Lab Test 07/12/24  1357 07/05/24  1143 07/02/24  1204 06/27/24  1435 07/11/21  1643 07/11/21  0710 07/10/21  2031 07/10/21  1741 07/10/21  0624 07/09/21  0632    139 136 138   < > 132*  --  132* 135 136   POTASSIUM 4.3 4.7 4.7 4.0   < > 5.1   < > 5.6* 4.6 4.8   CHLORIDE 99 102 98 101   < > 104  --  103 105 108   CO2 24 21* 23 20*   < > 23  --  24 23 23   ANIONGAP 14 16* 15 17*   < > 6  --  5 6 5   * 219* 110* 100*   < > 125*  --  258* 77 122*   BUN 76.3* 90.3* 86.8* 86.2*   < > 49*  --  44* 46* 41*   CR 4.48* 4.75* 5.31* 4.84*   < > 2.75*  --  2.48* 2.40* 2.33*   GFRESTIMATED 14* 13* 11* 12*   < > 23*  --  26* 27* 28*   GFRESTBLACK  --   --   --   --   --  27*  --  31* 32* 33*   QAMAR 8.5* 8.0* 8.5* 8.4*   < > 7.9*  --  8.1* 7.8* 7.5*    < > = values in this interval not displayed.     Recent Labs   Lab Test 07/05/24  1143 06/20/24  1040 05/21/24  0535 05/20/24  1403   BILITOTAL 0.5 0.5 0.5 0.6   ALKPHOS 89 101 110 132   ALT 20 20 38 49   AST 31 24 34 48*      CBC  Recent Labs   Lab Test 07/12/24  1357 07/05/24  1143 05/29/24  0600 05/28/24  0607 05/27/24  0526   HGB 11.4* 10.5* 10.4* 10.5* 9.9*   WBC  --  4.6 5.4 5.6 6.0   RBC  --  3.57* 3.46* 3.59* 3.38*   HCT  --  34.4* 32.5* 33.8* 32.0*   MCV  --  96 94 94 95   MCH  --  29.4 30.1 29.2 29.3   MCHC  --  30.5* 32.0 31.1* 30.9*   RDW  --  15.4* 14.4 14.0 13.9   PLT  --  182 192 215 188     URINE STUDIES  Recent Labs   Lab Test 07/05/24  1546 05/21/24  1306 05/20/24  1351 03/21/24  1007   COLOR Light Yellow Light Yellow Straw Yellow   APPEARANCE Clear Clear Clear Clear   URINEGLC Negative Negative Negative Negative   URINEBILI Negative Negative Negative Negative   URINEKETONE Negative Trace* Negative Negative   SG 1.011 1.015 1.007 1.015   UBLD Negative Negative Negative Trace*   URINEPH 5.0 5.5 6.5 5.5   PROTEIN Negative Negative Negative Trace*   UROBILINOGEN  --   --   --  0.2   NITRITE Negative Negative Negative Negative   LEUKEST Negative Negative Negative Trace*   RBCU <1 <1 0 0-2   WBCU 1 <1 <1 5-10*     No lab results found.  PTH  Recent Labs   Lab Test 07/12/24  1357 03/28/23  1129   PTHI 649* 529*     IRON STUDIES  Recent Labs   Lab Test 05/23/24  0511 08/05/21  0931 05/13/21  0534   IRON 16* 30* 38    240 215*   IRONSAT 5* 13* 18   VITA 24* 98 98       Gisela English NP        Again, thank you for allowing me to participate in the care of your patient.      Sincerely,    Gisela English NP

## 2024-07-12 NOTE — NURSING NOTE
Chief Complaint   Patient presents with    RECHECK     Follow up. Weight is down and urine production is up.      Vitals:    07/12/24 1440 07/12/24 1446 07/12/24 1447 07/12/24 1448   BP: 115/81 115/80 111/67 114/76   BP Location: Right arm Right arm Right arm    Patient Position: Sitting Sitting Sitting    Cuff Size: Adult Regular Adult Regular Adult Regular    Pulse: 108      SpO2: 100%      Weight: 92.1 kg (203 lb)          BP Readings from Last 3 Encounters:   07/12/24 114/76   07/05/24 122/78   07/02/24 114/80       /76   Pulse 108   Wt 92.1 kg (203 lb)   SpO2 100%   BMI 30.87 kg/m       Nazanin Arvizu

## 2024-07-12 NOTE — PROGRESS NOTES
ANTICOAGULATION MANAGEMENT     Jim Willingham 69 year old male is on warfarin with subtherapeutic INR result. (Goal INR 2.0-3.0)    Recent labs: (last 7 days)     07/12/24  1357   INR 1.94*       ASSESSMENT     Source(s): Chart Review and Patient/Caregiver Call     Warfarin doses taken: Warfarin taken as instructed  Diet: No new diet changes identified  Medication/supplement changes: None noted  New illness, injury, or hospitalization: No  Signs or symptoms of bleeding or clotting: No  Previous result: Therapeutic last 2(+) visits  Additional findings: None       PLAN     Recommended plan for no diet, medication or health factor changes affecting INR     Dosing Instructions: Continue your current warfarin dose with next INR in 1 week       Summary  As of 7/12/2024      Full warfarin instructions:  3.75 mg every Sun, Tue, Thu; 2.5 mg all other days   Next INR check:  7/19/2024               Telephone call with Jim who verbalizes understanding and agrees to plan  Sent [employer] message with dosing and follow up instructions    Contact [phone] to schedule and with any changes, questions or concerns.     Education provided: Please call back if any changes to your diet, medications or how you've been taking warfarin    Plan made per [facility] anticoagulation protocol    Brendan Mclaughlin, RN  Anticoagulation Clinic  7/12/2024    _______________________________________________________________________     Anticoagulation Episode Summary       Current INR goal:  2.0-3.0   TTR:  29.8% (1.3 mo)   Target end date:  Indefinite   Send INR reminders to:   ANTICO CLINIC    Indications    Transplanted heart (H) [Z94.1]  Pulmonary embolism (H) [I26.99]  Single subsegmental pulmonary embolism without acute cor pulmonale (H) [I26.93]             Comments:               Anticoagulation Care Providers       Provider Role Specialty Phone number    Riaz Montaño MD Referring Cardiovascular Disease [phone]    Meño  TIM Orta CNP Referring Cardiovascular Disease [phone]

## 2024-07-13 NOTE — PROGRESS NOTES
Nephrology Clinic Visit 7/12/24    Assessment and Plan:    CKD5 w/proteinuria - Creat 4.4, eGFR 14 ml/mn, UPCR 0.4 mg/mgCr    - Jim never recovered to previous baseline and has been running 4-5 since 6/24 with improved volume status     - Etiology for his CKD felt to be CNI   - Renal US 5/24 neg for hydro/masses   - DsDNA, Anca, Complements, KLR and immunofixation normal   - He does have elevated serum oxalate level of 8.6 but that may just be elevated given the WALTER   - Does have elevated PSA but again, no hydro on US   - No NSAIDs, Abx,    - Jim is familiar with dialysis. He has attended the [facility] program and is interested in PD.    - He is agreeable to PD access consult. He does not need to proceed with surgery at this time   - Will have him tour a PD unit. RNCC will help set that up   - He has been referred to transplant    2. Heart transplant 2021 - IS regimen: TAC/MMF. TAC level 5.3. Weight 203# ( was less at home) from 204# last visit. No edema/dyspnea/b/ps teens/  Current regimen: Bumex 3 mg bid   - Will continue for now   - Per Cardiology    3. Pulmonary embolism dx'd 5/24- On Warfarin.     4. Electrolytes - No acute concerns. K 4.3 Na 137    5. Acid base - No acute concerns. Bicarb 24    6. Elevated oxalate level in setting of WALTER and h/o RGBY ( 2003) - Serum oxalate level 8.6 ( 5/24).    - Continue Tums 1000 mg TID w/meals   - Repeat oxalate level in 3 months ( 9/24)    7. Anemia - Hgb 11.4   - Iron studies 5/24: Ferritin 24 Fe 16 IS 5   - Will set him up for IV iron   - Had colonoscopy 4/24 w/tubular polyp   - Not on oral iron but on B12    8. BMD - Ca 8.5 Phos 4.5 albumin 4.2   Vit D 38 ( 5/24)    ( 7/24)   On Vit D 25 mcg every day   Will begin Calcitriol 0.25 mcg qd    9. Gout - Controlled on Allopurinol.  Last Uric acid level 5.3 ( 5/24)    10. Disposition - RTC 9/9/24 for follow up with labs prior    Assessment and plan was discussed with patient and he voiced his understanding  and agreement.    Reason for Visit:  CKD5    HPI:  Mr Willingham is a 68 yo male with PMH significant for Heart transplant 2021, CKD, MICHAEL, DM2, RGBY 2003 present today for routine CKD follow up given declining renal function.   Diuretics have been adjusted in the interim and currently on Bumex 3 mg bid with good control of edema/dyspnea  Baseline creat 1.4-1.8 prior to hospital admission in May, now in the 4-5 range since 6/24 with improved volume status    ROS:   [place] reports feeling great today. 2 days ago he was struggling to move he was so tired. His energy seems to wax and wane, but today is a good day  He notes minimal edema  Home b/ps teens/  Denies dyspnea.   No CP  Carries his fluid in his abdomen, even his pannus was edematous, but now resolved  No voiding concerns other than frequency  Appetite is so so    Chronic Health Problems:    Heart transplant 2021  CKD5  MICHAEL  DM2  RGBY 2003  B12 def  COPD  Depression  BPH    Family Hx:   Family History   Problem Relation Age of Onset    Cerebrovascular Disease Mother 64    Diabetes Mother     Hypertension Mother     Coronary Artery Disease Father     Diabetes Type 2  Father     Obesity Brother     Obesity Brother     Cerebrovascular Disease Daughter 40     Personal Hx:   , retired respiratory therapist, 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, NS, ETOH rare    Allergies:  Allergies   Allergen Reactions    Grass Shortness Of Breath    Ace Inhibitors Cough    Cats     Dust Mites Other (See Comments)     Asthma    Mold Other (See Comments)     Asthma    Penicillins Other (See Comments)     Unknown - childhood exposure    Tolerated Zosyn 9/18-9/20/2020    Per patient report he has tolerated amoxicillin    Sulfa Antibiotics Other (See Comments) and Unknown     Unknown childhood reaction       Medications:  Current Outpatient Medications   Medication Sig Dispense Refill    acetaminophen (TYLENOL) 325 MG tablet Take 3 tablets (975 mg) by mouth every 8 hours as needed for  mild pain 100 tablet 0    albuterol (PROAIR HFA/PROVENTIL HFA/VENTOLIN HFA) 108 (90 Base) MCG/ACT inhaler Inhale 2 puffs into the lungs every 4 hours as needed for shortness of breath, wheezing or cough 40.2 g 2    allopurinol (ZYLOPRIM) 300 MG tablet TAKE 1 TABLET BY MOUTH DAILY 100 tablet 3    bumetanide (BUMEX) 1 MG tablet Take 3 tablets (3 mg) by mouth 2 times daily      buPROPion (WELLBUTRIN XL) 300 MG 24 hr tablet Take 1 tablet (300 mg) by mouth every morning 90 tablet 1    calcium carbonate (TUMS) 500 MG chewable tablet Take 2 tablets (1,000 mg) by mouth 3 times daily (with meals) 300 tablet 3    cyanocobalamin (VITAMIN B-12) 1000 MCG tablet Take 1 tablet (1,000 mcg) by mouth daily 90 tablet 3    diphenhydrAMINE (BENADRYL) 25 MG tablet Take 25 mg by mouth every 6 hours as needed for itching      gabapentin (NEURONTIN) 300 MG capsule Take 300 mg by mouth daily      ipratropium - albuterol 0.5 mg/2.5 mg/3 mL (DUONEB) 0.5-2.5 (3) MG/3ML neb solution Take 1 vial by nebulization 4 times daily as needed for shortness of breath, wheezing or cough      Melatonin 10 MG CAPS Take 1 capsule by mouth nightly as needed      montelukast (SINGULAIR) 10 MG tablet TAKE 1 TABLET BY MOUTH AT  BEDTIME 100 tablet 3    mycophenolate (GENERIC EQUIVALENT) 250 MG capsule Take 2 capsules (500 mg) by mouth 2 times daily 180 capsule 3    polyethylene glycol (MIRALAX) 17 GM/Dose powder Take 1 Capful by mouth daily as needed for constipation      rosuvastatin (CRESTOR) 10 MG tablet Take 1 tablet (10 mg) by mouth daily 90 tablet 0    tacrolimus (GENERIC EQUIVALENT) 0.5 MG capsule Take 1 capsule (0.5 mg) by mouth every morning With 3 mg capsules 90 capsule 3    tacrolimus (GENERIC EQUIVALENT) 1 MG capsule TAKE 3 CAPSULES BY MOUTh IN am and in pm.      traMADol (ULTRAM) 50 MG tablet TAKE ONE TABLET BY MOUTH EVERY MORNING AND AFTERNOON AND 2 AT BEDTIME AS NEEDED FOR PAIN      Vitamin D, Cholecalciferol, 25 MCG (1000 UT) CAPS Take 25 mcg by  mouth daily 90 capsule 3    warfarin ANTICOAGULANT (COUMADIN) 2.5 MG tablet Take 2.5 mg daily or as directed by anticoagulation clinic 30 tablet 3     No current facility-administered medications for this visit.      Vitals:  /76   Pulse 108   Wt 92.1 kg (203 lb)   SpO2 100%   BMI 30.87 kg/m      Exam:  GENERAL APPEARANCE: Pleasant male in NAD   RESP: lungs clear to auscultation  CV: tachy  EDEMA: no LE edema bilaterally  ABDOMEN: soft, nondistended, nontender  MS: extremities normal - no gross deformities noted  SKIN: no visible rash  Neuro: A/O    LABS:   CMP  Recent Labs   Lab Test 07/12/24  1357 07/05/24  1143 07/02/24  1204 06/27/24  1435 07/11/21  1643 07/11/21  0710 07/10/21  2031 07/10/21  1741 07/10/21  0624 07/09/21  0632    139 136 138   < > 132*  --  132* 135 136   POTASSIUM 4.3 4.7 4.7 4.0   < > 5.1   < > 5.6* 4.6 4.8   CHLORIDE 99 102 98 101   < > 104  --  103 105 108   CO2 24 21* 23 20*   < > 23  --  24 23 23   ANIONGAP 14 16* 15 17*   < > 6  --  5 6 5   * 219* 110* 100*   < > 125*  --  258* 77 122*   BUN 76.3* 90.3* 86.8* 86.2*   < > 49*  --  44* 46* 41*   CR 4.48* 4.75* 5.31* 4.84*   < > 2.75*  --  2.48* 2.40* 2.33*   GFRESTIMATED 14* 13* 11* 12*   < > 23*  --  26* 27* 28*   GFRESTBLACK  --   --   --   --   --  27*  --  31* 32* 33*   QAMAR 8.5* 8.0* 8.5* 8.4*   < > 7.9*  --  8.1* 7.8* 7.5*    < > = values in this interval not displayed.     Recent Labs   Lab Test 07/05/24  1143 06/20/24  1040 05/21/24  0535 05/20/24  1403   BILITOTAL 0.5 0.5 0.5 0.6   ALKPHOS 89 101 110 132   ALT 20 20 38 49   AST 31 24 34 48*     CBC  Recent Labs   Lab Test 07/12/24  1357 07/05/24  1143 05/29/24  0600 05/28/24  0607 05/27/24  0526   HGB 11.4* 10.5* 10.4* 10.5* 9.9*   WBC  --  4.6 5.4 5.6 6.0   RBC  --  3.57* 3.46* 3.59* 3.38*   HCT  --  34.4* 32.5* 33.8* 32.0*   MCV  --  96 94 94 95   MCH  --  29.4 30.1 29.2 29.3   MCHC  --  30.5* 32.0 31.1* 30.9*   RDW  --  15.4* 14.4 14.0 13.9   PLT  --  182  192 215 188     URINE STUDIES  Recent Labs   Lab Test 07/05/24  1546 05/21/24  1306 05/20/24  1351 03/21/24  1007   COLOR Light Yellow Light Yellow Straw Yellow   APPEARANCE Clear Clear Clear Clear   URINEGLC Negative Negative Negative Negative   URINEBILI Negative Negative Negative Negative   URINEKETONE Negative Trace* Negative Negative   SG 1.011 1.015 1.007 1.015   UBLD Negative Negative Negative Trace*   URINEPH 5.0 5.5 6.5 5.5   PROTEIN Negative Negative Negative Trace*   UROBILINOGEN  --   --   --  0.2   NITRITE Negative Negative Negative Negative   LEUKEST Negative Negative Negative Trace*   RBCU <1 <1 0 0-2   WBCU 1 <1 <1 5-10*     No lab results found.  PTH  Recent Labs   Lab Test 07/12/24  1357 03/28/23  1129   PTHI 649* 529*     IRON STUDIES  Recent Labs   Lab Test 05/23/24  0511 08/05/21  0931 05/13/21  0534   IRON 16* 30* 38    240 215*   IRONSAT 5* 13* 18   VITA 24* 98 98       Gisela English, NP

## 2024-07-15 NOTE — PROGRESS NOTES
Gisela English, Delisa Ramos, RN; P Dialysis Access Nurse  Braeden snider    Radha please set Jim up for IV iron. He is very iron deficient likely from his previous gastric bypass. We did not discuss but I'm sure he would be willing. Let's try to go with Injectafer 750 mg IV x 2  Also I sent an Rx for Calcitriol 0.25 mcg daily due to elevated PTH. Please let him know to take until I ask him to stop  He also needs contact info to set up a PD unit audra Cox  Please set up for PD access consult. 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

## 2024-07-15 NOTE — TELEPHONE ENCOUNTER
Dialysis Access Care Coordination: Initial Outreach    REASON FOR CALL:     REASON FOR CALL: Clinic Care Coordination - Initial (Dialysis Access - Referral)                                  SITUATION/BACKROUND:   Patient is being referred for dialysis access consult by Fiordaliza Verma NP requesting a PD catheter consult. 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    Nephrology provider: Fiordaliza Verma NP.  Nephrology RN Care Coordinator:  Delisa Mead RN.    Neph Tracking Flowsheet Last Filled Values       Patient's Referral Dates Auto Populate Patient's Referral Dates    Dietician Referral 9/14/20    MTM Referral 8/25/21    Palliative Referral 10/30/20    Home Care Referral 8/26/21    Journey Referral 6/21/24    Access Surgeon Referral Status  Referred    Dialysis Access Referral 07/15/24    Transplant Evaluation Referral 6/21/24    Transplant Status  Referred          Dialysis History    No dialysis history on file.         ASSESSMENT:     Heart transplant in 2021  DM 2  1/4/2024: diagnostic laparoscopy with extensive lysis of adhesions  2003: [facility]    Past Surgical History:   Procedure Laterality Date    ANESTHESIA CARDIOVERSION N/A 05/11/2020    Procedure: ANESTHESIA, FOR CARDIOVERSION @1100;  Surgeon: GENERIC ANESTHESIA PROVIDER;  [facility]    BRONCHOSCOPY (RIGID OR FLEXIBLE), DIAGNOSTIC N/A 8/30/2021    Procedure: BRONCHOSCOPY, WITH BRONCHOALVEOLAR LAVAGE;  Surgeon: Perlman, David Morris, MD;  [facility]: [facility]    COLONOSCOPY N/A 4/18/2024    Procedure: COLONOSCOPY, WITH POLYPECTOMY;  Surgeon: Jermaine Root MD;  [facility]    CV CORONARY ANGIOGRAM N/A 5/17/2022    Procedure: Coronary Angiogram;  Surgeon: Jeffrey Gibson MD;  Location:  HEART CARDIAC CATH LAB    CV CORONARY ANGIOGRAM N/A 5/23/2023    Procedure: Coronary Angiogram;  Surgeon: Scout Robins MD;  Location:  HEART CARDIAC CATH LAB    CV HEART BIOPSY N/A 5/13/2021    Procedure: Heart Biopsy;   Surgeon: Scout Robins MD;  Location:  HEART CARDIAC CATH LAB    CV HEART BIOPSY N/A 5/20/2021    Procedure: Heart Biopsy;  Surgeon: Jeffrey Gibson MD;  Location:  HEART CARDIAC CATH LAB    CV HEART BIOPSY N/A 5/27/2021    Procedure: Heart Biopsy;  Surgeon: Jac Dover MD;  Location:  HEART CARDIAC CATH LAB    CV HEART BIOPSY N/A 6/7/2021    Procedure: CV HEART BIOPSY;  Surgeon: Jeffrey Gibson MD;  Location:  HEART CARDIAC CATH LAB    CV HEART BIOPSY N/A 6/21/2021    Procedure: CV HEART BIOPSY;  Surgeon: Scout Robins MD;  Location:  HEART CARDIAC CATH LAB    CV HEART BIOPSY N/A 7/5/2021    Procedure: CV HEART BIOPSY;  Surgeon: Jeffrey Gibson MD;  Location:  HEART CARDIAC CATH LAB    CV HEART BIOPSY N/A 7/16/2021    Procedure: Heart Biopsy;  Surgeon: Amadeo Art MD;  Location:  HEART CARDIAC CATH LAB    CV HEART BIOPSY N/A 8/5/2021    Procedure: Heart Biopsy;  Surgeon: Jeffrey Gibson MD;  Location:  HEART CARDIAC CATH LAB    CV HEART BIOPSY N/A 8/31/2021    Procedure: Heart Cath Heart Biopsy;  Surgeon: Jeffrey Gibson MD;  Location:  HEART CARDIAC CATH LAB    CV HEART BIOPSY N/A 9/21/2021    Procedure: CV HEART BIOPSY;  Surgeon: Jeffrey Gibson MD;  Location:  HEART CARDIAC CATH LAB    CV HEART BIOPSY N/A 10/5/2021    Procedure: CV HEART BIOPSY;  Surgeon: Jeffrey Gibson MD;  Location:  HEART CARDIAC CATH LAB    CV HEART BIOPSY N/A 11/4/2021    Procedure: CV HEART BIOPSY;  Surgeon: Nicola Seth MD;  Location:  HEART CARDIAC CATH LAB    CV HEART BIOPSY N/A 5/17/2022    Procedure: Heart Biopsy;  Surgeon: Jeffrey Gibson MD;  Location:  HEART CARDIAC CATH LAB    CV HEART BIOPSY N/A 5/23/2023    Procedure: Heart Biopsy;  Surgeon: Scout Robins MD;  Location:  HEART CARDIAC CATH LAB    CV HEART BIOPSY N/A 5/23/2024    Procedure:  Heart Biopsy;  Surgeon: Precious Bautista MD;  Location:  HEART CARDIAC CATH LAB    CV RIGHT HEART CATH MEASUREMENTS RECORDED N/A 07/24/2019    Procedure: CV RIGHT HEART CATH;  Surgeon: Renu Sears MD;  Location:  HEART CARDIAC CATH LAB    CV RIGHT HEART CATH MEASUREMENTS RECORDED N/A 08/05/2020    Procedure: CV RIGHT HEART CATH;  Surgeon: Nicola Seth MD;  Location:  HEART CARDIAC CATH LAB    CV RIGHT HEART CATH MEASUREMENTS RECORDED N/A 01/07/2021    Procedure: CV RIGHT HEART CATH;  Surgeon: Jac Dover MD;  Location:  HEART CARDIAC CATH LAB    CV RIGHT HEART CATH MEASUREMENTS RECORDED N/A 02/23/2021    Procedure: Heart Cath Right Heart Cath;  Surgeon: Jeffrey Gibson MD;  Location:  HEART CARDIAC CATH LAB    CV RIGHT HEART CATH MEASUREMENTS RECORDED N/A 03/23/2021    Procedure: Heart Cath Right Heart Cath. request for 3/23;  Surgeon: Jeffrey Gibson MD;  Location:  HEART CARDIAC CATH LAB    CV RIGHT HEART CATH MEASUREMENTS RECORDED N/A 5/13/2021    Procedure: Right Heart Cath;  Surgeon: Scout Robins MD;  Location:  HEART CARDIAC CATH LAB    CV RIGHT HEART CATH MEASUREMENTS RECORDED N/A 5/20/2021    Procedure: Right Heart Cath;  Surgeon: Jeffrey Gibson MD;  Location:  HEART CARDIAC CATH LAB    CV RIGHT HEART CATH MEASUREMENTS RECORDED N/A 5/27/2021    Procedure: Right Heart Cath;  Surgeon: Jac Dover MD;  Location:  HEART CARDIAC CATH LAB    CV RIGHT HEART CATH MEASUREMENTS RECORDED N/A 6/7/2021    Procedure: CV RIGHT HEART CATH;  Surgeon: Jeffrey Gibson MD;  Location:  HEART CARDIAC CATH LAB    CV RIGHT HEART CATH MEASUREMENTS RECORDED N/A 6/21/2021    Procedure: CV RIGHT HEART CATH;  Surgeon: Scout Robins MD;  Location:  HEART CARDIAC CATH LAB    CV RIGHT HEART CATH MEASUREMENTS RECORDED N/A 7/5/2021    Procedure: CV RIGHT HEART CATH;  Surgeon: Jeffrey Gibson MD;   Location:  HEART CARDIAC CATH LAB    CV RIGHT HEART CATH MEASUREMENTS RECORDED N/A 7/16/2021    Procedure: Right Heart Cath;  Surgeon: Amadeo Art MD;  Location:  HEART CARDIAC CATH LAB    CV RIGHT HEART CATH MEASUREMENTS RECORDED N/A 8/5/2021    Procedure: CV RIGHT HEART CATH;  Surgeon: Jeffrey Gibson MD;  Location:  HEART CARDIAC CATH LAB    CV RIGHT HEART CATH MEASUREMENTS RECORDED N/A 8/31/2021    Procedure: Heart Cath Right Heart Cath;  Surgeon: Jeffrey Gibson MD;  Location:  HEART CARDIAC CATH LAB    CV RIGHT HEART CATH MEASUREMENTS RECORDED N/A 9/21/2021    Procedure: CV RIGHT HEART CATH;  Surgeon: Jeffrey Gibson MD;  Location:  HEART CARDIAC CATH LAB    CV RIGHT HEART CATH MEASUREMENTS RECORDED N/A 10/5/2021    Procedure: CV RIGHT HEART CATH;  Surgeon: Jeffrey Gibson MD;  Location:  HEART CARDIAC CATH LAB    CV RIGHT HEART CATH MEASUREMENTS RECORDED N/A 11/4/2021    Procedure: CV RIGHT HEART CATH;  Surgeon: Nicola Seth MD;  Location:  HEART CARDIAC CATH LAB    CV RIGHT HEART CATH MEASUREMENTS RECORDED N/A 5/17/2022    Procedure: Right Heart Catheterization;  Surgeon: Jeffrey Gibson MD;  Location:  HEART CARDIAC CATH LAB    CV RIGHT HEART CATH MEASUREMENTS RECORDED N/A 5/23/2023    Procedure: Right Heart Catheterization;  Surgeon: Scout Robins MD;  Location:  HEART CARDIAC CATH LAB    CV RIGHT HEART CATH MEASUREMENTS RECORDED N/A 5/23/2024    Procedure: Right Heart Cath- pre noon with rush path;  Surgeon: Precious Bautista MD;  Location:  HEART CARDIAC CATH LAB    GI SURGERY  2003    [facility]    INSERT VENTRICULAR ASSIST DEVICE LEFT (HEARTMATE II) N/A 06/19/2017    Procedure: INSERT VENTRICULAR ASSIST DEVICE LEFT (HEARTMATE II);  Median Sternotomy Heartmate II Left Ventricular Assist Device Insertion on Pump Oxygenator;  Surgeon: Ronnie Quigley MD;  [facility]    IR CHEST TUBE PLACEMENT  NON-TUNNELED RIGHT  7/11/2021    LAPAROSCOPY DIAGNOSTIC (GENERAL) N/A 1/4/2024    Procedure: Diagnostic Laparoscopy, Exploratory Laparotomy with Extensive lysis of adhesions.;  Surgeon: Frederick Montgomery MD;  [facility]    ORTHOPEDIC SURGERY  1994    right knee wired    PICC DOUBLE LUMEN PLACEMENT Right 09/23/2020    5FR PICC DL. Length-43cm (1cm out).    PICC INSERTION - DOUBLE LUMEN Right 05/09/2021    IK/BRACH    RELEASE CARPAL TUNNEL BILATERAL Bilateral 02/18/2021    Procedure: Bilateral carpal tunnel release;  Surgeon: Jermaine Brand MD;  [facility]    TRANSPLANT HEART RECIPIENT N/A 05/05/2021    Procedure: Redo median sternotomy, lysis of adhesions, heart transplant recipient, on cardiopulmonary bypass, intraoperative transesophageal echocardiogram per anesthesia, Implantable Cardioverter Defibrillator (ICD) removal;  Surgeon: Ronnie Quigley MD;  [facility]       Immunosuppression:  Tacrolimus  Mycophenolate    Anticoagulation:  Coumadin - pulmonary embolism in May 2024    Antiplatelet:  none     Recent Labs:  Lab Results   Component Value Date    CR 4.48 07/12/2024    CR 4.75 07/05/2024    CR 5.31 07/02/2024    CR 2.75 07/11/2021    CR 2.48 07/10/2021    CR 2.40 07/10/2021    Lab Results   Component Value Date    GFRESTIMATED 14 07/12/2024    GFRESTIMATED 13 07/05/2024    GFRESTIMATED 11 07/02/2024    GFRESTIMATED 42 12/15/2021    GFRESTIMATED 23 07/11/2021    GFRESTIMATED 26 07/10/2021    GFRESTIMATED 27 07/10/2021        Lab Results   Component Value Date    BUN 76.3 07/12/2024    BUN 90.3 07/05/2024    BUN 86.8 07/02/2024    BUN 25 03/28/2023    BUN 35 03/17/2023    BUN 32 05/17/2022    BUN 49 07/11/2021    BUN 44 07/10/2021    BUN 46 07/10/2021    Lab Results   Component Value Date    ALBUMIN 4.2 07/12/2024    ALBUMIN 4.1 07/05/2024    ALBUMIN 4.0 06/20/2024    ALBUMIN 3.5 05/17/2022    ALBUMIN 3.7 03/16/2022    ALBUMIN 2.8 12/12/2021    ALBUMIN 2.6 07/08/2021    ALBUMIN 2.3 07/05/2021     ALBUMIN 2.2 07/04/2021        Lab Results   Component Value Date    A1C 5.6 06/03/2024    A1C 5.8 01/17/2024    A1C 5.4 01/04/2024    A1C 5.1 05/04/2021    A1C 6.2 01/07/2021    A1C 5.7 11/05/2020         Dialysis Access Assessments:  No assessment indicated    PLAN:     Future Appts Next 180 days       Visit Type Date Time Department    RETURN NEPHROLOGY 8/19/2024  2:00 PM  MEDICINE RENAL    RETURN NEPHROLOGY 9/11/2024  2:00 PM [facility] RENAL            Follow Up:  Request sent to schedule PD catheter consult with Dr. Strickland.    Maryellen Wilson, RN  Dialysis Access Care Coordinator  Phone: [phone]  Pool: P_Dialysis_Access_Nurse

## 2024-07-17 NOTE — PROGRESS NOTES
Spoke to Jim about the new medication, [place] [facility] unit number for a tour,and the IV iron. Jim mentioned that he had done IV iron a few years ago and it went well but he had IV iron in hospital in May and he thinks he may have had a reaction to it- he got SOB so they stopped the infusion.  Writer will see which IV iron he had and relay this information to Fiordaliza to see what she'd like to do.    Writer also updated Eulalia Mccabe about Jim and that he may be calling for a PD tour.

## 2024-07-19 NOTE — TELEPHONE ENCOUNTER
Reviewed pt's chart for pre-kidney transplant evaluation planning. Coordinator first call on 07/12/2024. PKE STD on 09/30/2024 slot A.     Called patient again on 07/19/2024, reached  HANANE asking him to return my call.      services required: no. Is pt able to attend virtual education class? yes    Pt has CKD stage 5 thought to be from CNI, biopsy not done. Qualifying GFR of 14 on 07/12/2024.  Pt is not yet on dialysis.  Pt is type 2 diabetic, was on insulin intermittently before heart transplant but off insulin since heart transplant. Pt has lost approximately 22 pounds since heart transplant. Pt lost about 50 pounds in order to qualify for heart transplant - weighed 275 pounds pre heart and got down to 220 pounds at time of heart transplant. .       Heart hx: s/p OHT 05/06/2021 due to NICM. Immuno:. BID, tacrolimus target levels 4-6. Last seen by Dr. Montgomery 06/27/2024. Needs risk assessment - staff message to Yolette Rueda RN  Lung hx: COPD, asthma and history of smoking. Uses albuterol inhaler prn - mostly in the summer.   Surgical hx: Flash MORROW in 2003 at [facility]  weighed 362 pound pre-op, lost 142 pounds initially but then gained back up to 275.  Small bowel obstruction required lysis of adhesions on 01/04/2024.   Derm hx: has never seen one, will schedule with pre kidney transplant evaluation   Social history: worked as a respiratory therapist x 26 years.     Pt is a former light smoker - started in 1979 but smoked rarely until 1993, does occasionally consume alcohol, and does not use recreational drugs. Does not have current infection, not non-healing wounds, or active cancer.    Health maintenance items: BMI 29.8 on 06/12/2024.  PSA: 0.51 on 06/04/2024. Colonoscopy: 04/18/2024/ tubular adenoma.  Dental: due for check up / pt will schedule . Vaccinations: due for hepatitis B and Shingrix. Pt stating because one of the SE of Shingrix is guillain barre syndrome so he does not want  to get it ( pt states he has taken care of many patients with [employer] as a respiratory therapist). He will check on Hep B vaccinations.     Pt is independent w/ ADLs.  Pt lives in split level house w/ wife and great granddaughter is 10 years.  Wife and daughter will support following transplant. Pt does have potential living donors, grandson who is currently 17 years old.      Imaging Available: CT abd pelvic wo contrast 01/03/2024 - multiple layering calcified gallstones, moderate to severe aorto iliac artery vascular calcifications, dilated alimentary limb.     Contacted patient and introduced myself as their Transplant Coordinator, also introduced the role of the Transplant Coordinator in the transplant process.  Explained the purpose of this call including reviewing next steps and answering questions.      Confirmed Referring Provider, Fiordaliza English NP;and Primary Care Physician, Dr. Ricardo Gómez. Explained to the patient of the importance of continued communication with referring providers and primary care physicians.      Reviewed components of transplant evaluation process including necessary appointments, tests, and procedures.  Instructed pt to bring 1-2 people with them to eval and to eat and drink and normally on eval day    Answered questions for patient regarding evaluation, provided my name and contact information and requested they call/message with any additional questions or concerns.  Informed patient they will receive a letter with information discussed in referral call. Determined that patient would like information regarding transplant by:       Regular Mail, Email, [employer], and/or Phone Call.  Encouraged the use of [employer].    Scheduled patient for virtual pre transplant class on 08/08/2024 at 9:30 am. Link for educational videos will be provided in my letter.  Additional informational web sites about transplant were discussed. Links provided to www.[facility].org and www.srtr.org in letter sent to patient.   Pt expressed excellent understanding of all and was in good agreement with the plan.    Confirmed STD 09/30/2024 slot A, instructed pt to see schedule in My Chart.  As well as, receive an email from our Office prior to eval with a Receipt of Info and educational materials - instructed to read materials and sign consent prior to eval.     Smartset orders entered for pre kidney alone eval on 09/30/2024 slot A.     Generated Transplant Evaluation Summary Letter in EPIC - electronically sent to pt/ providers.

## 2024-07-22 NOTE — PROGRESS NOTES
ANTICOAGULATION MANAGEMENT     Jim Willingham 69 year old male is on warfarin with subtherapeutic INR result. (Goal INR 2.0-3.0)    Recent labs: (last 7 days)     07/19/24  1133   INR 1.73*       ASSESSMENT     Source(s): Chart Review  Previous INR was Subtherapeutic  Medication, diet, health changes since last INR chart reviewed; none identified    Also sent Conmiohart assessment      PLAN     Unable to reach pt today.    Left message to take a booster dose of warfarin,  3.75 mg tonight. Request call back for assessment.    Follow up required to confirm warfarin dose taken and assess for changes    Maryellen Clemons, RN  Anticoagulation Clinic  7/22/2024

## 2024-07-22 NOTE — TELEPHONE ENCOUNTER
Per Patient needs updated script, took his AM but, is out now.  Will Need before his PM dose.    Bumex 1.0MG    [id number] - [place], [place] - [address] Phone: [phone]   Fax: [phone]

## 2024-07-22 NOTE — PROGRESS NOTES
ANTICOAGULATION MANAGEMENT     Jim Willingham 69 year old male is on warfarin with subtherapeutic INR result. (Goal INR 2.0-3.0)    Recent labs: (last 7 days)     07/19/24  1133   INR 1.73*       ASSESSMENT     Source(s): Chart Review and Patient/Caregiver Call     Warfarin doses taken: Warfarin taken as instructed  Diet: Increased greens/vitamin K in diet; plans to resume previous intake - however last INR was low too, so I have advised a booster dose plus an increase in MD  Medication/supplement changes: None noted  New illness, injury, or hospitalization: No  Signs or symptoms of bleeding or clotting: No  Previous result: Subtherapeutic  Additional findings: None    See [facility] assessment from pt.      PLAN     Recommended plan for ongoing change(s) affecting INR     Dosing Instructions: booster dose then Increase your warfarin dose (5.9% change) with next INR in 1 week       Summary  As of 7/22/2024      Full warfarin instructions:  7/22: 3.75 mg; Otherwise 2.5 mg every Mon, Wed, Fri; 3.75 mg all other days   Next INR check:  7/29/2024               Sent [employer] message with dosing and follow up instructions    Contact [phone] to schedule and with any changes, questions or concerns.     Education provided: Please call back if any changes to your diet, medications or how you've been taking warfarin  Goal range and lab monitoring: goal range and significance of current result, Importance of therapeutic range, and Importance of following up at instructed interval  Dietary considerations: impact of vitamin K foods on INR    Plan made per [facility] anticoagulation protocol    Maryellen Clemons, RN  Anticoagulation Clinic  7/22/2024    _______________________________________________________________________     Anticoagulation Episode Summary       Current INR goal:  2.0-3.0   TTR:  25.4% (1.5 mo)   Target end date:  Indefinite   Send INR reminders to:  [facility]    Indications    Transplanted heart (H)  [Z94.1]  Pulmonary embolism (H) [I26.99]  Single subsegmental pulmonary embolism without acute cor pulmonale (H) [I26.93]             Comments:               Anticoagulation Care Providers       Provider Role Specialty Phone number    Riaz Montaño MD Referring Cardiovascular Disease [phone]    Rajani Wilder APRN CNP Referring Cardiovascular Disease [phone]

## 2024-07-23 NOTE — PROGRESS NOTES
Writer called [facility], scheduled patient for August 22nd and the 29th. . Writer notified patient

## 2024-07-26 NOTE — TELEPHONE ENCOUNTER
Pt called to check in. He states good days and bad days. Weight is 199 lbs today. Next bmp/INR is Monday. Upcoming appt reviewed. No further questions or complaints at this time.

## 2024-07-30 NOTE — TELEPHONE ENCOUNTER
Image Review Meeting    ATTENDEES: Dr. Leonel Ponce     IMAGES REVIEWED: CT chest/ abd/ pelvic wo contrast 05/22/2024 and CT abd pelvic wo contrast on 01/03/2024.     DECISION: vessel status suitable for kidney alone transplant.     INCIDENTALS: Yes: Dr. Ponce recommends pt to see [facility] and GI.    Few sub-6 mm pulmonary nodules, similar to prior; irregular shaped  curvilinear density in the medial left lung, indeterminate, possibly  atelectatic. Recommend attention on follow-up.   Mild apparent distal esophageal thickening near the  gastroesophageal junction, possibly secondary to underdistention, or  inflammatory etiology, consider attention on follow-up and/or  endoscopy for further evaluation as clinically desired.    Staff message today to Yolette Rueda RN post heart tx coord, asking if heart team has reviewed/ plans for above incidentals.

## 2024-07-30 NOTE — PROGRESS NOTES
ANTICOAGULATION MANAGEMENT     Jim Willingham 69 year old male is on warfarin with subtherapeutic INR result. (Goal INR 2.0-3.0)    Recent labs: (last 7 days)     07/29/24  1311   INR 1.81*       ASSESSMENT     Source(s): Chart Review     Warfarin doses taken: Reviewed in chart  Diet:  CAMILO  Medication/supplement changes:  7/13/24 Calcitriol ordered, no expected Warfarin interaction.  New illness, injury, or hospitalization: CAMILO  Signs or symptoms of bleeding or clotting: CAMILO  Previous result: Subtherapeutic - 7/22 last [facility] encounter booster dose then 5.9% maintenance dose increase advised. INR trending in the right direction.  Additional findings: None       PLAN     Unable to reach Jim today.    Left message to take a booster dose of warfarin,  5 mg tonight. Request call back for assessment.    Follow up required to confirm warfarin dose taken and assess for changes and discuss out of range result     Viktoria Ross RN  Anticoagulation Clinic  7/30/2024

## 2024-07-31 NOTE — PROGRESS NOTES
ANTICOAGULATION MANAGEMENT     Jim Willingham 69 year old male is on warfarin with subtherapeutic INR result. (Goal INR 2.0-3.0)    Recent labs: (last 7 days)     07/29/24  1311   INR 1.81*       ASSESSMENT     Source(s): Chart Review and Patient/Caregiver Call     Warfarin doses taken: Warfarin taken as instructed  Diet:  States he had cut off his greens intake due to previous low INR.  Medication/supplement changes: 7/13/24 Calcitriol ordered, no expected Warfarin interaction.   New illness, injury, or hospitalization: No  Signs or symptoms of bleeding or clotting: No  Previous result: Subtherapeutic - 7/22 last [facility] encounter booster dose then 5.9% maintenance dose increase advised. INR trending in the right direction.   Additional findings:  Jim stated he took 5 mg booster dose 7/30/24 as advised.       PLAN     Recommended plan for temporary change(s) affecting INR     Dosing Instructions: booster dose then continue your current warfarin dose with next INR in 1 week       Summary  As of 7/30/2024      Full warfarin instructions:  7/30: 5 mg; Otherwise 2.5 mg every Mon, Wed, Fri; 3.75 mg all other days   Next INR check:  8/5/2024               Telephone call with Jim who verbalizes understanding and agrees to plan    Lab visit scheduled    Education provided: Goal range and lab monitoring: goal range and significance of current result  Dietary considerations: impact of vitamin K foods on INR and vitamin K content of foods  Interaction IS NOT anticipated between warfarin and Calcitriol  Symptom monitoring: monitoring for bleeding signs and symptoms and monitoring for clotting signs and symptoms  Contact [phone] with any changes, questions or concerns.     Plan made per [facility] anticoagulation protocol    Viktoria Ross RN  Anticoagulation Clinic  7/31/2024    _______________________________________________________________________     Anticoagulation Episode Summary       Current INR goal:  2.0-3.0    TTR:  20.9% (1.9 mo)   Target end date:  Indefinite   Send INR reminders to:  [facility]    Indications    Transplanted heart (H) [Z94.1]  Pulmonary embolism (H) [I26.99]  Single subsegmental pulmonary embolism without acute cor pulmonale (H) [I26.93]             Comments:               Anticoagulation Care Providers       Provider Role Specialty Phone number    Riaz Montaño MD Referring Cardiovascular Disease [phone]    Rajani Wilder, APRN CNP Referring Cardiovascular Disease [phone]

## 2024-08-01 NOTE — PROGRESS NOTES
Gisela English, BARRIE Campot, Delisa J, RN  OK if he tolerates it. Let's do iron sulfate 325 mg bid  Thanks Radha

## 2024-08-02 PROBLEM — J69.0 ASPIRATION PNEUMONIA (H): Status: ACTIVE | Noted: 2024-01-01

## 2024-08-05 NOTE — PROGRESS NOTES
ANTICOAGULATION MANAGEMENT     Jim Willingham 69 year old male is on warfarin with therapeutic INR result. (Goal INR 2.0-3.0)    Recent labs: (last 7 days)     08/05/24  1016   INR 2.27*       ASSESSMENT     Source(s): Chart Review and Patient/Caregiver Call     Warfarin doses taken: Warfarin taken as instructed  Diet: No new diet changes identified  Medication/supplement changes: None noted  New illness, injury, or hospitalization: No  Signs or symptoms of bleeding or clotting: No  Previous result: Subtherapeutic  Additional findings: None       PLAN     Recommended plan for no diet, medication or health factor changes affecting INR     Dosing Instructions: Continue your current warfarin dose with next INR in 2 weeks       Summary  As of 8/5/2024      Full warfarin instructions:  2.5 mg every Mon, Wed, Fri; 3.75 mg all other days   Next INR check:  8/19/2024               Telephone call with Jim who verbalizes understanding and agrees to plan and who agrees to plan and repeated back plan correctly    Check at provider office visit    Education provided: Please call back if any changes to your diet, medications or how you've been taking warfarin  Taking warfarin: purpose of warfarin and how it works and Importance of taking warfarin as instructed  Goal range and lab monitoring: goal range and significance of current result, Importance of therapeutic range, and Importance of following up at instructed interval    Plan made per [facility] anticoagulation protocol    Michelle Phipps RN  Anticoagulation Clinic  8/5/2024    _______________________________________________________________________     Anticoagulation Episode Summary       Current INR goal:  2.0-3.0   TTR:  25.0% (2.1 mo)   Target end date:  Indefinite   Send INR reminders to:  [facility]    Indications    Transplanted heart (H) [Z94.1]  Pulmonary embolism (H) [I26.99]  Single subsegmental pulmonary embolism without acute cor pulmonale (H)  [I26.93]             Comments:               Anticoagulation Care Providers       Provider Role Specialty Phone number    Riaz Montaño MD Referring Cardiovascular Disease [phone]    Rajani Wilder APRN CNP Referring Cardiovascular Disease [phone]

## 2024-08-07 NOTE — PROGRESS NOTES
Neph Tracking Flowsheet Last Filled Values       Kidney Smart CKD Basics Complete 07/31/24    Kidney Smart Modality Education Complete 07/31/24    Dialysis Unit Tour Referral --  7/15/24- gave Jim # to [facility] PD unit to call for tour.    Patient's Referral Dates Auto Populate Patient's Referral Dates    Dietician Referral 9/14/20    MTM Referral 8/25/21    Palliative Referral 10/30/20    Home Care Referral 8/26/21    Journey Referral 6/21/24    Access Surgeon Referral Status  Referred    Dialysis Access Referral 07/15/24    Access Surgical Consult 08/12/24  Dr. Hugo, PD catheter consult    Transplant Evaluation Referral 6/21/24    Transplant Status  Referred

## 2024-08-08 NOTE — GROUP NOTE
Date: 8/8/2024    Scheduled Start Time:  9:30 AM   Scheduled End Time: 10:30 AM    Department: [facility]    Facilitator(s): Dionna Salguero RN    Documentation:  Solid Organ Transplant Education      Patient attended the pre-transplant patient education class today. The [facility] website pre-transplant modules were viewed:     Content reviewed:  Living Donation and how to access that program  Paired exchange  Kidney Donor Profile Index (KDPI)  Waiting list issues (right to decline without penalty, high PHS risk donors, what to expect when called with an offer)  Hospital experience, length of stay, need to stay locally post-discharge (2-4 weeks)    Surgical complications with video                      Post-surgery lifting and driving restrictions  Post-transplant routines, frequency of lab work and clinic visits  Role of Transplant Coordinator    Participants were informed of the benefits of transplant as well as potential risks such as infection, cancer, and death. The need for total adherence with immunosuppression medications and following transplant regimens was stressed.  The overall evaluation/approval/listing process was reviewed.  Pt was engaged in class and all questions were answered.            Patient:   Jim Willingham

## 2024-08-12 NOTE — TELEPHONE ENCOUNTER
Dialysis Access Care Coordination: Consult Outreach    REASON FOR VISIT:   Patient presents to clinic for PD catheter consult with Dr. Strickland. 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    Nephrology provider: Fiordaliza Verma NP.  Nephrology RN Care Coordinator:  Delisa Mead RN.    Patient accompanied by self.    SITUATION/BACKROUND:     Neph Tracking Flowsheet Last Filled Values       Kidney Smart CKD Basics Complete 07/31/24    Kidney Smart Modality Education Complete 07/31/24    Dialysis Unit Tour Referral --  7/15/24- gave Jim # to [facility] PD unit to call for tour.    Patient's Referral Dates Auto Populate Patient's Referral Dates    Dietician Referral 9/14/20    MTM Referral 8/25/21    Palliative Referral 10/30/20    Home Care Referral 8/26/21    Journey Referral 6/21/24    Access Surgeon Referral Status  Referred    Dialysis Access Referral 07/15/24    Access Surgical Consult 08/12/24  Dr. Hugo, PD catheter consult    Transplant Evaluation Referral 6/21/24    Transplant Status  Referred             Dialysis History    No dialysis history on file.          Patient is followed by Solid Organ Transplant 2/2 Kidney Transplant Referral - 6/21/2024.  Coordinator: Yolette Jimenez    ASSESSMENT:     PD tour completed at [facility] with Nuris PD RN.  Patient has taken Kidney Smart class and continues to watch educational videos weekly.    Planned dialysis support people: spouse    Dialysis access screening:   Pets? Yes: 3 dogs. They typically do not go into his room. He will be able to keep the dogs out of the room where he dialyzes and away from supplies.  Constipation concerns? No  Nausea/Vomiting? No  Diabetic? No  Physical limitations? Yes: generalized weakness, able to lift 25 lbs  Cognitive limitations? No  Storage space limitations? No  Dominant hand? left      Immunosuppression:  Tacrolimus  Mycophenolate    Patient was counseled to avoid Everolimus/Sirolimus prior to  surgery.    Anticoagulation:  Coumadin - for recent PE. He expects this to be a 3 month course, which will end soon.    Antiplatelet:  none     Recent Labs:      Latest Ref Rng & Units 8/5/2024 7/29/2024 7/19/2024   Neph Labs   Sodium 135 - 145 mmol/L 139  136  138    GFR Estimate >60 mL/min/1.73m2 17  14  13    Potassium 3.4 - 5.3 mmol/L 4.1  4.4  4.9    Creatinine 0.67 - 1.17 mg/dL 3.81  4.50  4.55    Urea Nitrogen 8.0 - 23.0 mg/dL 78.3  83.0  92.2           Relevant Surgical & Medical Hx:  Sylvester en Y in 2003  Heart transplant 2021  Small bowel obstruction and laparoscopic lysis of adhesions 1/2024    -Reports some numbness in fingertips. Previously diabetic but seems to no longer be an issue since losing weight.   Latest Reference Range & Units 06/03/24 10:49   Hemoglobin A1C 0.0 - 5.6 % 5.6       Dialysis Access Assessments:   No assessment indicated      EDUCATION:     Dialysis access surgery education provided:   - Pre-Op: pre-op H&P or PAC within 30 days prior to surgery; may need to hold certain medications (e.g. blood thinners) for a few days prior to surgery.   - Surgery: done as an outpatient procedure; patient will go home the same day.   - Post-Op: need someone to drive them home and stay with them for the first night after surgery; post-op appointment with the surgeon.       Peritoneal dialysis access education provided:   - Basic PD process done at home every day.   - Positioning of PD catheter in the abdomen and surgical placement.  - Leaving dressing in place for 1 week after surgery and then is changed by PD nurse.  - What to expect for PD training and when PD training can be started.  - No lifting, twisting, driving, bathing/showering until PD catheter is healed.  - Storage requirements for supplies.  - Pets in the home and keeping them out of the room where PD is performed.  - Contraindication to baths as well as swimming in lakes, streams, hot tubs, and public pools.  - Importance of preventing  constipation to avoid displacement of PD catheter.  - Dialysate solution containing sugar, which adds calories and can affect blood glucose levels if diabetic.  - Signs and symptoms of possible complications including infection, displacement of catheter, or occlusion.       PLAN:     Future Appts Next 180 days       Visit Type Date Time Department    SOT PD CATHETER CONSULT 8/12/2024  3:15 PM [facility] S[facility] SURGERY    RETURN NEPHROLOGY 8/19/2024  2:00 PM  MEDICINE RENAL    RETURN NEPHROLOGY 9/16/2024  2:30 PM  MEDICINE RENAL    SOT EVAL NURSE ONLY 9/30/2024  7:30 AM UC SOT    SOT EVAL K/P AFSANEH 9/30/2024  8:30 AM UC SOT    SOT EVAL K/P SURG 9/30/2024  9:30 AM UC SOT    SOT EVAL SOCIAL WORK 9/30/2024 10:00 AM UC SOT    SOT EVAL NUTRITION 9/30/2024 10:30 AM UC SOT    SOT EVAL CARE COORDINATOR 9/30/2024 10:45 AM UC SOT    XR GENERAL XRAY 9/30/2024 12:15 PM [facility] XRAY             PLAN:  Laparoscopic PD catheter placement vs AVF creation under general anesthesia. Vein mapping to be ordered and completed prior to surgery. If it is not possible to place the PD catheter, surgeon will proceed with AV fistula creation. Patient should have PAC prior. Timing of surgery to be determined by nephrology.    Follow Up:  RN CC updated nephrology team.    Patient verbalized understanding and will follow up as recommended.    Maryellen Wilson RN  Dialysis Access Care Coordinator  Phone: [phone]  Pool: P_Dialysis_Access_Nurse

## 2024-08-12 NOTE — Clinical Note
8/12/2024      Jim Willingham  [address] [address]      Dear Colleague,    Thank you for referring your patient, Jim Willingham, to the [facility]. Please see a copy of my visit note below.    No notes on file    Again, thank you for allowing me to participate in the care of your patient.        Sincerely,        Jim Strickland MD

## 2024-08-12 NOTE — NURSING NOTE
"Chief Complaint   Patient presents with    Consult     PD cath     Vital signs:  Temp: 98  F (36.7  C) Temp src: Oral BP: 97/64 Pulse: 116   Resp: 18 SpO2: 98 %     Height: 172.7 cm (5' 7.99\") Weight: 86.2 kg (190 lb)  Estimated body mass index is 28.9 kg/m  as calculated from the following:    Height as of this encounter: 1.727 m (5' 7.99\").    Weight as of this encounter: 86.2 kg (190 lb).      Amber Kinney, [facility]  8/12/2024 3:18 PM    "

## 2024-08-19 NOTE — NURSING NOTE
Chief Complaint   Patient presents with    RECHECK     5 week follow up.      Vitals:    08/19/24 1404 08/19/24 1408 08/19/24 1411 08/19/24 1412   BP: 107/69 108/68 102/69 106/69   BP Location: Right arm Right arm Right arm    Patient Position: Sitting Sitting Sitting    Cuff Size: Adult Regular Adult Regular Adult Regular    Pulse: 106      SpO2: 99%      Weight: 88.3 kg (194 lb 9.6 oz)          BP Readings from Last 3 Encounters:   08/19/24 106/69   08/12/24 97/64   07/12/24 114/76       /69   Pulse 106   Wt 88.3 kg (194 lb 9.6 oz)   SpO2 99%   BMI 29.60 kg/m       Nazanin Arvizu

## 2024-08-19 NOTE — LETTER
8/19/2024       RE: Jim Willingham  [address]  [place] [address]     Dear Colleague,    Thank you for referring your patient, Jim Willingham, to the [facility] [place] at [facility]. Please see a copy of my visit note below.    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 Creat 3.9, eGFR 16 ml/mn, UPCR 0.2 mg/mgCr    - Jim never recovered to previous baseline however, creat has improved with improved volume status and now running in the upper 3 range.      - Etiology for his CKD felt to be CNI/CRS   - Renal US 5/24 neg for hydro/masses   - DsDNA, Anca, Complements, KLR and immunofixation normal   - He does have elevated serum oxalate level of 8.6 but that may just be elevated given the WALTER   - Does have elevated PSA but again, no hydro on US   - No NSAIDs, Abx,    - Jim is familiar with dialysis. He has attended the Kidney Smart program and is interested in PD.    - He met with surgeon and PD is not guaranteed given h/o abd surgery. He may end up with HD access. We decided to proceed with access surgery when GFR is 9. If he has a PD cath would begin PD, if he ends up with an HD access then we would continue to monitor his renal function and start HD when GFR more in the 6-7 range.    - Jim toured the PD training unit.    - Begins transplant eval on 9/30/24    2. Heart transplant 2021 - IS regimen: TAC/MMF. TAC level 5.3. Weight 189# at home this morning. No edema/dyspnea/b/ps teens/  Current regimen: Bumex 3 mg bid   - Will continue for now   - Per Cardiology    3. Pulmonary embolism dx'd 5/24- On Warfarin.     4. Electrolytes - No acute concerns. K 4.8 Na 138    5. Acid base - No acute concerns. Bicarb 22    6. Elevated oxalate level in setting of WALTER and h/o AMENA ( 2003) - Serum oxalate level 8.6 ( 5/24).    - Continue Tums 1000 mg TID w/meals   - Repeat oxalate  level in 3 months ( 9/24)    7. Anemia - Hgb 10.5   - Iron studies 8/24 Improved: Ferritin 151 Fe 40 IS 16   - Did not tolerate IV iron   - Had colonoscopy 4/24 w/tubular polyp   - On oral iron bid and B12   - Does not meet criteria for ALOK    8. BMD - Ca 8.7 Phos 5.3 albumin 4.1   Vit D 38 ( 5/24)    ( 7/24)   On Vit D 25 mcg every day   Began Calcitriol 0.25 mcg every day ( 7/24)    9. Gout - Controlled on Allopurinol.  Last Uric acid level 5.3 ( 5/24)    10. Disposition - RTC 9/16/24 for follow up with labs prior    Assessment and plan was discussed with patient and he voiced his understanding and agreement.    Reason for Visit:  CKD4/5    HPI:  Mr Willingham is a 70 yo male with PMH significant for Heart transplant 2021, CKD, MICHAEL, DM2, RGBY 2003 present today for routine CKD follow up.   Baseline creat 1.4-1.8 prior to hospital admission in May, now in the upper 3's with improvement in volume status    ROS:   [place] feels good w/o complaint other than a mild URI that began about 7 days ago. No fever or dyspnea. Hasn't checked for COVID.   No edema. Weight is great at 189# this morning at home  Home b/ps teens/  Denies dyspnea.   No CP  No GI concerns  No voiding concerns other than frequency  Appetite is good  Energy level good. 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, NS, ETOH rare    Allergies:  Allergies   Allergen Reactions     Grass Shortness Of Breath     Ace Inhibitors Cough     Cats      Dust Mites Other (See Comments)      Asthma     Mold Other (See Comments)     Asthma     Penicillins Other (See Comments)     Unknown - childhood exposure    Tolerated Zosyn 9/18-9/20/2020    Per patient report he has tolerated amoxicillin     Sulfa Antibiotics Other (See Comments) and Unknown     Unknown childhood reaction       Medications:  Current Outpatient Medications   Medication Sig Dispense Refill     acetaminophen (TYLENOL) 325 MG tablet Take 3 tablets (975 mg) by mouth every 8 hours as needed for mild pain 100 tablet 0     albuterol (PROAIR HFA/PROVENTIL HFA/VENTOLIN HFA) 108 (90 Base) MCG/ACT inhaler INSTILL 2 INHALATIONS BY MOUTH  EVERY 4 HOURS AS NEEDED FOR  SHORTNESS OF BREATH`, WHEEZING  OR COUGH 40.2 g 0     allopurinol (ZYLOPRIM) 300 MG tablet TAKE 1 TABLET BY MOUTH DAILY 100 tablet 3     bumetanide (BUMEX) 1 MG tablet Take 3 tablets (3 mg) by mouth 2 times daily 540 tablet 3     buPROPion (WELLBUTRIN XL) 300 MG 24 hr tablet TAKE 1 TABLET BY MOUTH IN THE  MORNING 90 tablet 3     calcitRIOL (ROCALTROL) 0.25 MCG capsule Take 1 capsule (0.25 mcg) by mouth daily 90 capsule 3     calcium carbonate (TUMS) 500 MG chewable tablet Take 2 tablets (1,000 mg) by mouth 3 times daily (with meals) 300 tablet 3     cyanocobalamin (VITAMIN B-12) 1000 MCG tablet Take 1 tablet (1,000 mcg) by mouth daily 90 tablet 3     diphenhydrAMINE (BENADRYL) 25 MG tablet Take 25 mg by mouth every 6 hours as needed for itching       ferrous sulfate (FEROSUL) 325 (65 Fe) MG tablet Take 1 tablet (325 mg) by mouth 2 times daily (with meals) 60 tablet 6     gabapentin (NEURONTIN) 300 MG capsule Take 300 mg by mouth daily       ipratropium - albuterol 0.5 mg/2.5 mg/3 mL (DUONEB) 0.5-2.5 (3) MG/3ML neb solution Take 1 vial by nebulization 4 times daily as needed for shortness of breath, wheezing or cough       Melatonin 10 MG CAPS Take 1 capsule by mouth nightly as needed       montelukast (SINGULAIR) 10 MG tablet TAKE 1 TABLET BY MOUTH AT  BEDTIME 100 tablet 3      mycophenolate (GENERIC EQUIVALENT) 250 MG capsule Take 2 capsules (500 mg) by mouth 2 times daily 180 capsule 3     polyethylene glycol (MIRALAX) 17 GM/Dose powder Take 1 Capful by mouth daily as needed for constipation       rosuvastatin (CRESTOR) 10 MG tablet TAKE 1 TABLET BY MOUTH DAILY 90 tablet 1     tacrolimus (GENERIC EQUIVALENT) 0.5 MG capsule Take 1 capsule (0.5 mg) by mouth every morning With 3 mg capsules 90 capsule 3     tacrolimus (GENERIC EQUIVALENT) 1 MG capsule TAKE 3 CAPSULES BY MOUTh IN am and in pm. 180 capsule 3     traMADol (ULTRAM) 50 MG tablet TAKE ONE TABLET BY MOUTH EVERY MORNING AND AFTERNOON AND 2 AT BEDTIME AS NEEDED FOR PAIN       Vitamin D, Cholecalciferol, 25 MCG (1000 UT) CAPS Take 25 mcg by mouth daily 90 capsule 3     warfarin ANTICOAGULANT (COUMADIN) 2.5 MG tablet Take 2.5 mg daily or as directed by anticoagulation clinic 30 tablet 3     No current facility-administered medications for this visit.      Vitals:  /69   Pulse 106   Wt 88.3 kg (194 lb 9.6 oz)   SpO2 99%   BMI 29.60 kg/m      Exam:  GENERAL APPEARANCE: Pleasant male in NAD   RESP: lungs clear to auscultation  CV: tachy  EDEMA: no LE edema bilaterally  ABDOMEN: soft, nondistended, nontender  MS: extremities normal - no gross deformities noted  SKIN: no visible rash  Neuro: A/O    LABS:   CMP  Recent Labs   Lab Test 08/19/24  1258 08/05/24  1016 07/29/24  1311 07/19/24  1133 07/11/21  1643 07/11/21  0710 07/10/21  2031 07/10/21  1741 07/10/21  0624 07/09/21  0632    139 136 138   < > 132*  --  132* 135 136   POTASSIUM 4.8 4.1 4.4 4.9   < > 5.1   < > 5.6* 4.6 4.8   CHLORIDE 102 103 98 100   < > 104  --  103 105 108   CO2 22 22 21* 24   < > 23  --  24 23 23   ANIONGAP 14 14 17* 14   < > 6  --  5 6 5   * 100* 91 100*   < > 125*  --  258* 77 122*   BUN 91.2* 78.3* 83.0* 92.2*   < > 49*  --  44* 46* 41*   CR 3.95* 3.81* 4.50* 4.55*   < > 2.75*  --  2.48* 2.40* 2.33*   GFRESTIMATED 16* 17* 14* 13*   < >  23*  --  26* 27* 28*   GFRESTBLACK  --   --   --   --   --  27*  --  31* 32* 33*   QAMAR 8.7* 8.4* 8.6* 8.2*   < > 7.9*  --  8.1* 7.8* 7.5*    < > = values in this interval not displayed.     Recent Labs   Lab Test 07/05/24  1143 06/20/24  1040 05/21/24  0535 05/20/24  1403   BILITOTAL 0.5 0.5 0.5 0.6   ALKPHOS 89 101 110 132   ALT 20 20 38 49   AST 31 24 34 48*     CBC  Recent Labs   Lab Test 08/19/24  1258 07/12/24  1357 07/05/24  1143 05/29/24  0600 05/28/24  0607 05/27/24  0526   HGB 10.5* 11.4* 10.5* 10.4* 10.5* 9.9*   WBC  --   --  4.6 5.4 5.6 6.0   RBC  --   --  3.57* 3.46* 3.59* 3.38*   HCT  --   --  34.4* 32.5* 33.8* 32.0*   MCV  --   --  96 94 94 95   MCH  --   --  29.4 30.1 29.2 29.3   MCHC  --   --  30.5* 32.0 31.1* 30.9*   RDW  --   --  15.4* 14.4 14.0 13.9   PLT  --   --  182 192 215 188     URINE STUDIES  Recent Labs   Lab Test 07/05/24  1546 05/21/24  1306 05/20/24  1351 03/21/24  1007   COLOR Light Yellow Light Yellow Straw Yellow   APPEARANCE Clear Clear Clear Clear   URINEGLC Negative Negative Negative Negative   URINEBILI Negative Negative Negative Negative   URINEKETONE Negative Trace* Negative Negative   SG 1.011 1.015 1.007 1.015   UBLD Negative Negative Negative Trace*   URINEPH 5.0 5.5 6.5 5.5   PROTEIN Negative Negative Negative Trace*   UROBILINOGEN  --   --   --  0.2   NITRITE Negative Negative Negative Negative   LEUKEST Negative Negative Negative Trace*   RBCU <1 <1 0 0-2   WBCU 1 <1 <1 5-10*     No lab results found.  PTH  Recent Labs   Lab Test 07/12/24  1357 03/28/23  1129   PTHI 649* 529*     IRON STUDIES  Recent Labs   Lab Test 08/19/24  1258 05/23/24  0511 08/05/21  0931   IRON 40* 16* 30*    306 240   IRONSAT 16 5* 13*   VITA 151 24* 98       Gisela English NP        Again, thank you for allowing me to participate in the care of your patient.      Sincerely,    Gisela English NP

## 2024-08-19 NOTE — PROGRESS NOTES
ANTICOAGULATION MANAGEMENT     Jim Willingham 69 year old male is on warfarin with subtherapeutic INR result. (Goal INR 2.0-3.0)    Recent labs: (last 7 days)     08/19/24  1258   INR 1.97*       ASSESSMENT     Source(s): Chart Review     Warfarin doses taken: Reviewed in chart  Diet: No new diet changes identified  Medication/supplement changes: None noted  New illness, injury, or hospitalization: No  Signs or symptoms of bleeding or clotting: No  Previous result: Therapeutic last visit; previously outside of goal range  Additional findings:  Jim has been at low end of his INR goal range or below for several weeks;  will increase warfarin dose by 5% and recheck INR in one week.       PLAN     Recommended plan for no diet, medication or health factor changes affecting INR     Dosing Instructions: Increase your warfarin dose (5.6% change) with next INR in 1 week       Summary  As of 8/19/2024      Full warfarin instructions:  2.5 mg every Wed, Sat; 3.75 mg all other days   Next INR check:  8/26/2024               Detailed voice message left for Jim with dosing instructions and follow up date.   Sent Jugo message with dosing and follow up instructions    Contact [phone] to schedule and with any changes, questions or concerns.     Education provided: Please call back if any changes to your diet, medications or how you've been taking warfarin  Contact [phone] with any changes, questions or concerns.     Plan made per [facility] anticoagulation protocol    Kirsty Bermudez RN  Anticoagulation Clinic  8/19/2024    _______________________________________________________________________     Anticoagulation Episode Summary       Current INR goal:  2.0-3.0   TTR:  36.8% (2.6 mo)   Target end date:  Indefinite   Send INR reminders to:  [facility]    Indications    Transplanted heart (H) [Z94.1]  Pulmonary embolism (H) [I26.99]  Single subsegmental pulmonary embolism without acute cor pulmonale (H)  [I26.93]             Comments:               Anticoagulation Care Providers       Provider Role Specialty Phone number    Riaz Montaño MD Referring Cardiovascular Disease [phone]    Rajani Wilder APRN CNP Referring Cardiovascular Disease [phone]

## 2024-08-20 NOTE — PROGRESS NOTES
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 Creat 3.9, eGFR 16 ml/mn, UPCR 0.2 mg/mgCr    - Jim never recovered to previous baseline however, creat has improved with improved volume status and now running in the upper 3 range.      - Etiology for his CKD felt to be CNI/CRS   - Renal US 5/24 neg for hydro/masses   - DsDNA, Anca, Complements, KLR and immunofixation normal   - He does have elevated serum oxalate level of 8.6 but that may just be elevated given the WALTER   - Does have elevated PSA but again, no hydro on US   - No NSAIDs, Abx,    - Jim is familiar with dialysis. He has attended the [facility] program and is interested in PD.    - He met with surgeon and PD is not guaranteed given h/o abd surgery. He may end up with HD access. We decided to proceed with access surgery when GFR is 9. If he has a PD cath would begin PD, if he ends up with an HD access then we would continue to monitor his renal function and start HD when GFR more in the 6-7 range.    - Jim toured the PD training unit.    - Begins transplant eval on 9/30/24    2. Heart transplant 2021 - IS regimen: TAC/MMF. TAC level 5.3. Weight 189# at home this morning. No edema/dyspnea/b/ps teens/  Current regimen: Bumex 3 mg bid   - Will continue for now   - Per Cardiology    3. Pulmonary embolism dx'd 5/24- On Warfarin.     4. Electrolytes - No acute concerns. K 4.8 Na 138    5. Acid base - No acute concerns. Bicarb 22    6. Elevated oxalate level in setting of WALTER and h/o RGBY ( 2003) - Serum oxalate level 8.6 ( 5/24).    - Continue Tums 1000 mg TID w/meals   - Repeat oxalate level in 3 months ( 9/24)    7. Anemia - Hgb 10.5   - Iron studies 8/24 Improved: Ferritin 151 Fe 40 IS 16   - Did not tolerate IV iron   - Had colonoscopy 4/24 w/tubular polyp   - On oral iron bid and B12   - Does not meet criteria for ALOK    8. BMD - Ca 8.7 Phos 5.3 albumin 4.1   Vit D 38 ( 5/24)    ( 7/24)   On Vit  D 25 mcg every day   Began Calcitriol 0.25 mcg every day ( 7/24)    9. Gout - Controlled on Allopurinol.  Last Uric acid level 5.3 ( 5/24)    10. Disposition - RTC 9/16/24 for follow up with labs prior    Assessment and plan was discussed with patient and he voiced his understanding and agreement.    Reason for Visit:  CKD4/5    HPI:  Mr Willingham is a 68 yo male with PMH significant for Heart transplant 2021, CKD, MICHAEL, DM2, RGBY 2003 present today for routine CKD follow up.   Baseline creat 1.4-1.8 prior to hospital admission in May, now in the upper 3's with improvement in volume status    ROS:   [place] feels good w/o complaint other than a mild URI that began about 7 days ago. No fever or dyspnea. Hasn't checked for COVID.   No edema. Weight is great at 189# this morning at home  Home b/ps teens/  Denies dyspnea.   No CP  No GI concerns  No voiding concerns other than frequency  Appetite is good  Energy level good. 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, NS, ETOH rare    Allergies:  Allergies   Allergen Reactions    Grass Shortness Of Breath    Ace Inhibitors Cough    Cats     Dust Mites Other (See Comments)     Asthma    Mold Other (See Comments)     Asthma    Penicillins Other (See Comments)     Unknown - childhood exposure    Tolerated Zosyn 9/18-9/20/2020    Per patient report he has tolerated amoxicillin    Sulfa Antibiotics Other (See Comments) and Unknown     Unknown childhood reaction       Medications:  Current Outpatient  Medications   Medication Sig Dispense Refill    acetaminophen (TYLENOL) 325 MG tablet Take 3 tablets (975 mg) by mouth every 8 hours as needed for mild pain 100 tablet 0    albuterol (PROAIR HFA/PROVENTIL HFA/VENTOLIN HFA) 108 (90 Base) MCG/ACT inhaler INSTILL 2 INHALATIONS BY MOUTH  EVERY 4 HOURS AS NEEDED FOR  SHORTNESS OF BREATH`, WHEEZING  OR COUGH 40.2 g 0    allopurinol (ZYLOPRIM) 300 MG tablet TAKE 1 TABLET BY MOUTH DAILY 100 tablet 3    bumetanide (BUMEX) 1 MG tablet Take 3 tablets (3 mg) by mouth 2 times daily 540 tablet 3    buPROPion (WELLBUTRIN XL) 300 MG 24 hr tablet TAKE 1 TABLET BY MOUTH IN THE  MORNING 90 tablet 3    calcitRIOL (ROCALTROL) 0.25 MCG capsule Take 1 capsule (0.25 mcg) by mouth daily 90 capsule 3    calcium carbonate (TUMS) 500 MG chewable tablet Take 2 tablets (1,000 mg) by mouth 3 times daily (with meals) 300 tablet 3    cyanocobalamin (VITAMIN B-12) 1000 MCG tablet Take 1 tablet (1,000 mcg) by mouth daily 90 tablet 3    diphenhydrAMINE (BENADRYL) 25 MG tablet Take 25 mg by mouth every 6 hours as needed for itching      ferrous sulfate (FEROSUL) 325 (65 Fe) MG tablet Take 1 tablet (325 mg) by mouth 2 times daily (with meals) 60 tablet 6    gabapentin (NEURONTIN) 300 MG capsule Take 300 mg by mouth daily      ipratropium - albuterol 0.5 mg/2.5 mg/3 mL (DUONEB) 0.5-2.5 (3) MG/3ML neb solution Take 1 vial by nebulization 4 times daily as needed for shortness of breath, wheezing or cough      Melatonin 10 MG CAPS Take 1 capsule by mouth nightly as needed      montelukast (SINGULAIR) 10 MG tablet TAKE 1 TABLET BY MOUTH AT  BEDTIME 100 tablet 3    mycophenolate (GENERIC EQUIVALENT) 250 MG capsule Take 2 capsules (500 mg) by mouth 2 times daily 180 capsule 3    polyethylene glycol (MIRALAX) 17 GM/Dose powder Take 1 Capful by mouth daily as needed for constipation      rosuvastatin (CRESTOR) 10 MG tablet TAKE 1 TABLET BY MOUTH DAILY 90 tablet 1    tacrolimus (GENERIC EQUIVALENT) 0.5 MG capsule  Take 1 capsule (0.5 mg) by mouth every morning With 3 mg capsules 90 capsule 3    tacrolimus (GENERIC EQUIVALENT) 1 MG capsule TAKE 3 CAPSULES BY MOUTh IN am and in pm. 180 capsule 3    traMADol (ULTRAM) 50 MG tablet TAKE ONE TABLET BY MOUTH EVERY MORNING AND AFTERNOON AND 2 AT BEDTIME AS NEEDED FOR PAIN      Vitamin D, Cholecalciferol, 25 MCG (1000 UT) CAPS Take 25 mcg by mouth daily 90 capsule 3    warfarin ANTICOAGULANT (COUMADIN) 2.5 MG tablet Take 2.5 mg daily or as directed by anticoagulation clinic 30 tablet 3     No current facility-administered medications for this visit.      Vitals:  /69   Pulse 106   Wt 88.3 kg (194 lb 9.6 oz)   SpO2 99%   BMI 29.60 kg/m      Exam:  GENERAL APPEARANCE: Pleasant male in NAD   RESP: lungs clear to auscultation  CV: tachy  EDEMA: no LE edema bilaterally  ABDOMEN: soft, nondistended, nontender  MS: extremities normal - no gross deformities noted  SKIN: no visible rash  Neuro: A/O    LABS:   CMP  Recent Labs   Lab Test 08/19/24  1258 08/05/24  1016 07/29/24  1311 07/19/24  1133 07/11/21  1643 07/11/21  0710 07/10/21  2031 07/10/21  1741 07/10/21  0624 07/09/21  0632    139 136 138   < > 132*  --  132* 135 136   POTASSIUM 4.8 4.1 4.4 4.9   < > 5.1   < > 5.6* 4.6 4.8   CHLORIDE 102 103 98 100   < > 104  --  103 105 108   CO2 22 22 21* 24   < > 23  --  24 23 23   ANIONGAP 14 14 17* 14   < > 6  --  5 6 5   * 100* 91 100*   < > 125*  --  258* 77 122*   BUN 91.2* 78.3* 83.0* 92.2*   < > 49*  --  44* 46* 41*   CR 3.95* 3.81* 4.50* 4.55*   < > 2.75*  --  2.48* 2.40* 2.33*   GFRESTIMATED 16* 17* 14* 13*   < > 23*  --  26* 27* 28*   GFRESTBLACK  --   --   --   --   --  27*  --  31* 32* 33*   QAMAR 8.7* 8.4* 8.6* 8.2*   < > 7.9*  --  8.1* 7.8* 7.5*    < > = values in this interval not displayed.     Recent Labs   Lab Test 07/05/24  1143 06/20/24  1040 05/21/24  0535 05/20/24  1403   BILITOTAL 0.5 0.5 0.5 0.6   ALKPHOS 89 101 110 132   ALT 20 20 38 49   AST 31 24 34  48*     CBC  Recent Labs   Lab Test 08/19/24  1258 07/12/24  1357 07/05/24  1143 05/29/24  0600 05/28/24  0607 05/27/24  0526   HGB 10.5* 11.4* 10.5* 10.4* 10.5* 9.9*   WBC  --   --  4.6 5.4 5.6 6.0   RBC  --   --  3.57* 3.46* 3.59* 3.38*   HCT  --   --  34.4* 32.5* 33.8* 32.0*   MCV  --   --  96 94 94 95   MCH  --   --  29.4 30.1 29.2 29.3   MCHC  --   --  30.5* 32.0 31.1* 30.9*   RDW  --   --  15.4* 14.4 14.0 13.9   PLT  --   --  182 192 215 188     URINE STUDIES  Recent Labs   Lab Test 07/05/24  1546 05/21/24  1306 05/20/24  1351 03/21/24  1007   COLOR Light Yellow Light Yellow Straw Yellow   APPEARANCE Clear Clear Clear Clear   URINEGLC Negative Negative Negative Negative   URINEBILI Negative Negative Negative Negative   URINEKETONE Negative Trace* Negative Negative   SG 1.011 1.015 1.007 1.015   UBLD Negative Negative Negative Trace*   URINEPH 5.0 5.5 6.5 5.5   PROTEIN Negative Negative Negative Trace*   UROBILINOGEN  --   --   --  0.2   NITRITE Negative Negative Negative Negative   LEUKEST Negative Negative Negative Trace*   RBCU <1 <1 0 0-2   WBCU 1 <1 <1 5-10*     No lab results found.  PTH  Recent Labs   Lab Test 07/12/24  1357 03/28/23  1129   PTHI 649* 529*     IRON STUDIES  Recent Labs   Lab Test 08/19/24  1258 05/23/24  0511 08/05/21  0931   IRON 40* 16* 30*    306 240   IRONSAT 16 5* 13*   VITA 151 24* 98       Gisela English, BARRIE

## 2024-08-23 PROBLEM — I50.9 CHF EXACERBATION (H): Status: ACTIVE | Noted: 2024-01-01

## 2024-08-23 NOTE — PROGRESS NOTES
Chief Complaint   Patient presents with    Urgent Care    Cough     Have a productive cough and a low grade fever, been 10-12 days, (wife had the same thing and she got Prednisone and antibiotic)    Fever                 ASSESSMENT:     ICD-10-CM    1. Acute cough  R05.1       2. Low grade fever  R50.9       3. History of pulmonary embolism  Z86.711       4. Anticoagulated on Coumadin  Z79.01       5. History of heart transplant (H)  Z94.1             PLAN: With complex medical issues of heart transplant, recent pulmonary embolism in May, anticoagulated on Coumadin I do not feel comfortable seeing patient here in urgent care setting where we are limited on resources.  I recommend he go to the ER for further evaluation and treatment.  He is upset as he states he has come to urgent care here many times in the past and there has not been an issue.  However when he was seen here in the past he did not have a history of pulmonary embolism.  Patient care instructions are discussed/given at the end of visit.     Pooja Ramirez PA-C      SUBJECTIVE:  69-year-old male presents for acute productive cough and low-grade temp over the past 10 to 12 days.  Wife was seen for similar illness and was treated with prednisone and antibiotics.  He is hoping for the same.  History of heart transplant.  Pulmonary embolism in May, now on Coumadin.        Allergies   Allergen Reactions    Grass Shortness Of Breath    Ace Inhibitors Cough    Cats     Dust Mites Other (See Comments)     Asthma    Mold Other (See Comments)     Asthma    Penicillins Other (See Comments)     Unknown - childhood exposure    Tolerated Zosyn 9/18-9/20/2020    Per patient report he has tolerated amoxicillin    Sulfa Antibiotics Other (See Comments) and Unknown     Unknown childhood reaction       Past Medical History:   Diagnosis Date    Aspiration pneumonia (H) 01/06/2024    Bariatric surgery status 2003    Benign essential hypertension 05/11/2017     Bilateral carpal tunnel syndrome 11/02/2020    BPH with obstruction/lower urinary tract symptoms 03/21/2024    Cardiomyopathy, unspecified (H) 05/08/2017    CKD (chronic kidney disease) stage 3, GFR 30-59 ml/min (H) 05/11/2017    COPD (chronic obstructive pulmonary disease) (H) 11/02/2020    Depression 05/11/2017    Diabetes mellitus (H) 1995    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    Generalized osteoarthritis 09/26/2023    Gouty arthropathy, chronic, without tophi 11/02/2020    H/O adenomatous polyp of colon 08/03/2016    2 polyps    H/O gastric bypass 05/11/2017    History of type 2 diabetes mellitus 03/28/2023    Hyperlipidemia LDL goal <70 09/27/2022    ICD (implantable cardioverter-defibrillator), biventricular, in situ 05/11/2017    IFG (impaired fasting glucose) 09/26/2023    LVAD (left ventricular assist device) present (H)     Major depression, recurrent, chronic (H24) 11/02/2020    Mild anemia 03/18/2022    NICM (nonischemic cardiomyopathy) (H)/ EF 20% 05/11/2017    ECHO: LVEDd. 7.66 cm, Restrictive pattern , Severe mitral valve regurgitation    CECILIA (obstructive sleep apnea) 05/11/2017    Paroxysmal atrial fibrillation (H) 05/11/2017    Paroxysmal VT (H) 05/11/2017    Peripheral polyneuropathy 03/28/2023    Postsurgical dumping syndrome 03/21/2024    Pulmonary cavitary lesion 11/18/2021    Rotator cuff tear arthropathy of both shoulders 11/02/2020    Type 2 diabetes mellitus with diabetic polyneuropathy (H) 03/18/2022    Uncomplicated asthma     Vitamin B12 deficiency (non anemic) 05/11/2017       Current Outpatient Medications   Medication Sig Dispense Refill    acetaminophen (TYLENOL) 325 MG tablet Take 3 tablets (975 mg) by mouth every 8 hours as needed for mild pain 100 tablet 0    albuterol (PROAIR HFA/PROVENTIL HFA/VENTOLIN HFA) 108 (90 Base) MCG/ACT inhaler INSTILL 2 INHALATIONS BY MOUTH  EVERY 4 HOURS AS NEEDED FOR  SHORTNESS OF BREATH`, WHEEZING  OR COUGH 40.2 g 0    allopurinol (ZYLOPRIM) 300  MG tablet TAKE 1 TABLET BY MOUTH DAILY 100 tablet 3    bumetanide (BUMEX) 1 MG tablet Take 3 tablets (3 mg) by mouth 2 times daily 540 tablet 3    buPROPion (WELLBUTRIN XL) 300 MG 24 hr tablet TAKE 1 TABLET BY MOUTH IN THE  MORNING 90 tablet 3    calcitRIOL (ROCALTROL) 0.25 MCG capsule Take 1 capsule (0.25 mcg) by mouth daily 90 capsule 3    calcium carbonate (TUMS) 500 MG chewable tablet Take 2 tablets (1,000 mg) by mouth 3 times daily (with meals) 300 tablet 3    cyanocobalamin (VITAMIN B-12) 1000 MCG tablet Take 1 tablet (1,000 mcg) by mouth daily 90 tablet 3    diphenhydrAMINE (BENADRYL) 25 MG tablet Take 25 mg by mouth every 6 hours as needed for itching      ferrous sulfate (FEROSUL) 325 (65 Fe) MG tablet Take 1 tablet (325 mg) by mouth 2 times daily (with meals) 60 tablet 6    gabapentin (NEURONTIN) 300 MG capsule Take 300 mg by mouth daily      ipratropium - albuterol 0.5 mg/2.5 mg/3 mL (DUONEB) 0.5-2.5 (3) MG/3ML neb solution Take 1 vial by nebulization 4 times daily as needed for shortness of breath, wheezing or cough      Melatonin 10 MG CAPS Take 1 capsule by mouth nightly as needed      montelukast (SINGULAIR) 10 MG tablet TAKE 1 TABLET BY MOUTH AT  BEDTIME 100 tablet 3    mycophenolate (GENERIC EQUIVALENT) 250 MG capsule Take 2 capsules (500 mg) by mouth 2 times daily 180 capsule 3    polyethylene glycol (MIRALAX) 17 GM/Dose powder Take 1 Capful by mouth daily as needed for constipation      rosuvastatin (CRESTOR) 10 MG tablet TAKE 1 TABLET BY MOUTH DAILY 90 tablet 1    tacrolimus (GENERIC EQUIVALENT) 0.5 MG capsule Take 1 capsule (0.5 mg) by mouth every morning With 3 mg capsules 90 capsule 3    tacrolimus (GENERIC EQUIVALENT) 1 MG capsule TAKE 3 CAPSULES BY MOUTh IN am and in pm. 180 capsule 3    traMADol (ULTRAM) 50 MG tablet TAKE ONE TABLET BY MOUTH EVERY MORNING AND AFTERNOON AND 2 AT BEDTIME AS NEEDED FOR PAIN      Vitamin D, Cholecalciferol, 25 MCG (1000 UT) CAPS Take 25 mcg by mouth daily 90  capsule 3    warfarin ANTICOAGULANT (COUMADIN) 2.5 MG tablet Take 2.5 mg daily or as directed by anticoagulation clinic 30 tablet 3     No current facility-administered medications for this visit.       Social History     Tobacco Use    Smoking status: Former     Current packs/day: 0.00     Types: Cigarettes     Quit date:      Years since quittin.6     Passive exposure: Never    Smokeless tobacco: Never   Substance Use Topics    Alcohol use: Yes     Comment: on occasion       ROS:  CONSTITUTIONAL: As above .  EYES: Negative for eye problems.  ENT: Negative for sore throat .  RESP: As above.  GI: Negative for vomiting.  PSYCH: Normal mentation for age.    OBJECTIVE:  /69 (BP Location: Left arm, Patient Position: Sitting, Cuff Size: Adult Regular)   Pulse 120   Temp 97.7  F (36.5  C) (Tympanic)   Resp 24   Wt 89.4 kg (197 lb)   SpO2 100%   BMI 29.96 kg/m    GENERAL APPEARANCE: NAD  EYES:Conjunctiva/sclera clear.  RESP: Breathing comfortably   NEURO: Awake, alert    SKIN: No rashes      Pooja Ramirez PA-C

## 2024-08-24 PROBLEM — Z94.1 HEART TRANSPLANT STATUS (H): Status: ACTIVE | Noted: 2024-01-01

## 2024-08-24 PROBLEM — R06.00 DYSPNEA: Status: ACTIVE | Noted: 2024-01-01

## 2024-08-24 PROBLEM — N17.9 AKI (ACUTE KIDNEY INJURY) (H): Status: ACTIVE | Noted: 2024-01-01

## 2024-08-24 NOTE — PROGRESS NOTES
Order for CPAP at [facility]. Patient has refused and has not worn one in a few years. He states after his transplant and weight loss he no longer found the need for it.     Annie Lynch, RT

## 2024-08-24 NOTE — TELEPHONE ENCOUNTER
[facility], is requesting to transfer a patient who is a N patient. Call transferred to  staff.      Lucila Alexander RN, BSN  Triage Nurse Advisor            Reason for Disposition   Call about patient who is currently hospitalized    Protocols used: PCP Call - No Triage-A-

## 2024-08-24 NOTE — ED TRIAGE NOTES
Pt BIBA c/o SOB ,abnormal labs, coughing  eval fluid overload , HX CHF . Pt was seen at [facility].  /81   Pulse (!) 128   Temp 98.1  F (36.7  C) (Oral)   Resp 18   SpO2 100%

## 2024-08-24 NOTE — ED PROVIDER NOTES
History     Chief Complaint   Patient presents with    Shortness of Breath     HPI  Jim Willingham is a 69 year old male with a past medical history of CHF, CKD, prior gastric bypass, anemia, COPD, depression, heart transplant who presents to the emergency department with a chief complaint of shortness of breath.  Patient was sent here due to CHF exacerbation and WALTER.  Plan for admission to cardiology 2 service.  Patient reports that he was wheezing earlier, but this improved after he was given a nebulizer treatment at [place] [facility].  Patient reports he has not noticed increased lower extremity edema, though he normally feels as if he retains fluid more in his abdomen.  Abdomen is also not distended.  Patient does report some mild shortness of breath, mostly associated with cough.  He has had symptoms for the past 10 to 12 days.  He reports that his wife had similar symptoms and was prescribed prednisone and an antibiotic.    Patient did have labs at [place] [facility] earlier today, including D-dimer which was within normal limits, BNP elevated at 992, INR 1.7, CBC showing hemoglobin of 10, normal white blood cell count, BMP showing BUN of 104 and creatinine 4.96.  Per chart review, patient's recent BUN has been between 70 and 90 and creatinine runs between 3.81 and 5.3.    I have reviewed the Medications, Allergies, Past Medical and Surgical History, and Social History in the [employer] system.    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    Generalized osteoarthritis 09/26/2023    Gouty arthropathy, chronic, without tophi 11/02/2020    H/O adenomatous polyp of colon 08/03/2016    2 polyps    H/O gastric bypass 05/11/2017    History of type 2 diabetes mellitus 03/28/2023    Hyperlipidemia LDL goal <70 09/27/2022    ICD (implantable cardioverter-defibrillator), biventricular, in situ 05/11/2017    IFG (impaired fasting glucose) 09/26/2023    LVAD (left ventricular assist device) present (H)     Major depression, recurrent, chronic (H24) 11/02/2020    Mild anemia 03/18/2022    NICM (nonischemic cardiomyopathy) (H)/ EF 20% 05/11/2017    ECHO: LVEDd. 7.66 cm, Restrictive pattern , Severe mitral valve regurgitation    CECILIA (obstructive sleep apnea) 05/11/2017    Paroxysmal atrial fibrillation (H) 05/11/2017    Paroxysmal VT (H) 05/11/2017    Peripheral polyneuropathy 03/28/2023    Postsurgical dumping syndrome 03/21/2024    Pulmonary cavitary lesion 11/18/2021    Rotator cuff tear arthropathy of both shoulders 11/02/2020    Type 2 diabetes mellitus with diabetic polyneuropathy (H) 03/18/2022    Uncomplicated asthma     Vitamin B12 deficiency (non anemic) 05/11/2017     Past Surgical History:   Procedure Laterality Date    ANESTHESIA CARDIOVERSION N/A 05/11/2020    Procedure: ANESTHESIA, FOR CARDIOVERSION @1100;  Surgeon: GENERIC ANESTHESIA PROVIDER;  Location:  OR    BRONCHOSCOPY (RIGID OR FLEXIBLE), DIAGNOSTIC N/A 8/30/2021    Procedure: BRONCHOSCOPY, WITH BRONCHOALVEOLAR LAVAGE;  Surgeon: Perlman, David Morris, MD;  Location:  GI    COLONOSCOPY N/A 4/18/2024    Procedure: COLONOSCOPY, WITH POLYPECTOMY;  Surgeon: Jermaine Root MD;  Location:  GI    CV CORONARY ANGIOGRAM N/A 5/17/2022    Procedure: Coronary Angiogram;  Surgeon: Jeffrey Gibson MD;  Location:  HEART CARDIAC CATH LAB    CV CORONARY ANGIOGRAM N/A 5/23/2023    Procedure: Coronary Angiogram;  Surgeon: Scout Robins MD;  Location:   HEART CARDIAC CATH LAB    CV HEART BIOPSY N/A 5/13/2021    Procedure: Heart Biopsy;  Surgeon: Scout Robins MD;  Location:  HEART CARDIAC CATH LAB    CV HEART BIOPSY N/A 5/20/2021    Procedure: Heart Biopsy;  Surgeon: Jeffrey Gibson MD;  Location:  HEART CARDIAC CATH LAB    CV HEART BIOPSY N/A 5/27/2021    Procedure: Heart Biopsy;  Surgeon: Jac Dover MD;  Location:  HEART CARDIAC CATH LAB    CV HEART BIOPSY N/A 6/7/2021    Procedure: CV HEART BIOPSY;  Surgeon: Jeffrey Gibson MD;  Location:  HEART CARDIAC CATH LAB    CV HEART BIOPSY N/A 6/21/2021    Procedure: CV HEART BIOPSY;  Surgeon: Scout Robins MD;  Location:  HEART CARDIAC CATH LAB    CV HEART BIOPSY N/A 7/5/2021    Procedure: CV HEART BIOPSY;  Surgeon: Jeffrey Gibson MD;  Location:  HEART CARDIAC CATH LAB    CV HEART BIOPSY N/A 7/16/2021    Procedure: Heart Biopsy;  Surgeon: Amadeo Art MD;  Location:  HEART CARDIAC CATH LAB    CV HEART BIOPSY N/A 8/5/2021    Procedure: Heart Biopsy;  Surgeon: Jeffrey Gibson MD;  Location:  HEART CARDIAC CATH LAB    CV HEART BIOPSY N/A 8/31/2021    Procedure: Heart Cath Heart Biopsy;  Surgeon: Jeffrey Gibson MD;  Location:  HEART CARDIAC CATH LAB    CV HEART BIOPSY N/A 9/21/2021    Procedure: CV HEART BIOPSY;  Surgeon: Jeffrey Gibson MD;  Location:  HEART CARDIAC CATH LAB    CV HEART BIOPSY N/A 10/5/2021    Procedure: CV HEART BIOPSY;  Surgeon: Jeffrey Gibson MD;  Location:  HEART CARDIAC CATH LAB    CV HEART BIOPSY N/A 11/4/2021    Procedure: CV HEART BIOPSY;  Surgeon: Nicola Seth MD;  Location:  HEART CARDIAC CATH LAB    CV HEART BIOPSY N/A 5/17/2022    Procedure: Heart Biopsy;  Surgeon: Jeffrey Gibson MD;  Location:  HEART CARDIAC CATH LAB    CV HEART BIOPSY N/A 5/23/2023    Procedure: Heart Biopsy;  Surgeon: cSout Robins,  MD;  Location:  HEART CARDIAC CATH LAB    CV HEART BIOPSY N/A 5/23/2024    Procedure: Heart Biopsy;  Surgeon: Precious Bautista MD;  Location:  HEART CARDIAC CATH LAB    CV RIGHT HEART CATH MEASUREMENTS RECORDED N/A 07/24/2019    Procedure: CV RIGHT HEART CATH;  Surgeon: Renu Sears MD;  Location:  HEART CARDIAC CATH LAB    CV RIGHT HEART CATH MEASUREMENTS RECORDED N/A 08/05/2020    Procedure: CV RIGHT HEART CATH;  Surgeon: Nicola Seth MD;  Location:  HEART CARDIAC CATH LAB    CV RIGHT HEART CATH MEASUREMENTS RECORDED N/A 01/07/2021    Procedure: CV RIGHT HEART CATH;  Surgeon: Jac Dover MD;  Location:  HEART CARDIAC CATH LAB    CV RIGHT HEART CATH MEASUREMENTS RECORDED N/A 02/23/2021    Procedure: Heart Cath Right Heart Cath;  Surgeon: Jeffrey Gibson MD;  Location:  HEART CARDIAC CATH LAB    CV RIGHT HEART CATH MEASUREMENTS RECORDED N/A 03/23/2021    Procedure: Heart Cath Right Heart Cath. request for 3/23;  Surgeon: Jeffrey Gibson MD;  Location:  HEART CARDIAC CATH LAB    CV RIGHT HEART CATH MEASUREMENTS RECORDED N/A 5/13/2021    Procedure: Right Heart Cath;  Surgeon: Scout Robins MD;  Location:  HEART CARDIAC CATH LAB    CV RIGHT HEART CATH MEASUREMENTS RECORDED N/A 5/20/2021    Procedure: Right Heart Cath;  Surgeon: Jeffrey Gibson MD;  Location:  HEART CARDIAC CATH LAB    CV RIGHT HEART CATH MEASUREMENTS RECORDED N/A 5/27/2021    Procedure: Right Heart Cath;  Surgeon: Jac Dover MD;  Location:  HEART CARDIAC CATH LAB    CV RIGHT HEART CATH MEASUREMENTS RECORDED N/A 6/7/2021    Procedure: CV RIGHT HEART CATH;  Surgeon: Jeffrey Gibson MD;  Location:  HEART CARDIAC CATH LAB    CV RIGHT HEART CATH MEASUREMENTS RECORDED N/A 6/21/2021    Procedure: CV RIGHT HEART CATH;  Surgeon: Scout Robins MD;  Location:  HEART CARDIAC CATH LAB    CV RIGHT HEART CATH MEASUREMENTS RECORDED N/A  7/5/2021    Procedure: CV RIGHT HEART CATH;  Surgeon: Jeffrey Gibson MD;  Location:  HEART CARDIAC CATH LAB    CV RIGHT HEART CATH MEASUREMENTS RECORDED N/A 7/16/2021    Procedure: Right Heart Cath;  Surgeon: Amadeo Art MD;  Location:  HEART CARDIAC CATH LAB    CV RIGHT HEART CATH MEASUREMENTS RECORDED N/A 8/5/2021    Procedure: CV RIGHT HEART CATH;  Surgeon: Jeffrey Gibson MD;  Location:  HEART CARDIAC CATH LAB    CV RIGHT HEART CATH MEASUREMENTS RECORDED N/A 8/31/2021    Procedure: Heart Cath Right Heart Cath;  Surgeon: Jeffrey Gibson MD;  Location:  HEART CARDIAC CATH LAB    CV RIGHT HEART CATH MEASUREMENTS RECORDED N/A 9/21/2021    Procedure: CV RIGHT HEART CATH;  Surgeon: Jeffrey Gibson MD;  Location:  HEART CARDIAC CATH LAB    CV RIGHT HEART CATH MEASUREMENTS RECORDED N/A 10/5/2021    Procedure: CV RIGHT HEART CATH;  Surgeon: Jeffrey Gibson MD;  Location:  HEART CARDIAC CATH LAB    CV RIGHT HEART CATH MEASUREMENTS RECORDED N/A 11/4/2021    Procedure: CV RIGHT HEART CATH;  Surgeon: Nicola Seth MD;  Location:  HEART CARDIAC CATH LAB    CV RIGHT HEART CATH MEASUREMENTS RECORDED N/A 5/17/2022    Procedure: Right Heart Catheterization;  Surgeon: Jeffrey Gibson MD;  Location:  HEART CARDIAC CATH LAB    CV RIGHT HEART CATH MEASUREMENTS RECORDED N/A 5/23/2023    Procedure: Right Heart Catheterization;  Surgeon: Scout Robins MD;  Location:  HEART CARDIAC CATH LAB    CV RIGHT HEART CATH MEASUREMENTS RECORDED N/A 5/23/2024    Procedure: Right Heart Cath- pre noon with rush path;  Surgeon: Precious Bautista MD;  Location:  HEART CARDIAC CATH LAB    GI SURGERY  2003    [facility]    INSERT VENTRICULAR ASSIST DEVICE LEFT (HEARTMATE II) N/A 06/19/2017    Procedure: INSERT VENTRICULAR ASSIST DEVICE LEFT (HEARTMATE II);  Median Sternotomy Heartmate II Left Ventricular Assist Device Insertion on  Pump Oxygenator;  Surgeon: Ronnie Quigley MD;  Location: [facility] OR    IR CHEST TUBE PLACEMENT NON-TUNNELED RIGHT  7/11/2021    LAPAROSCOPY DIAGNOSTIC (GENERAL) N/A 1/4/2024    Procedure: Diagnostic Laparoscopy, Exploratory Laparotomy with Extensive lysis of adhesions.;  Surgeon: Frederick Montgomery MD;  Location: [facility] OR    ORTHOPEDIC SURGERY  1994    right knee wired    PICC DOUBLE LUMEN PLACEMENT Right 09/23/2020    5FR PICC DL. Length-43cm (1cm out).    PICC INSERTION - DOUBLE LUMEN Right 05/09/2021    IK/BRACH    RELEASE CARPAL TUNNEL BILATERAL Bilateral 02/18/2021    Procedure: Bilateral carpal tunnel release;  Surgeon: Jermaine Brand MD;  Location: [facility] OR    TRANSPLANT HEART RECIPIENT N/A 05/05/2021    Procedure: Redo median sternotomy, lysis of adhesions, heart transplant recipient, on cardiopulmonary bypass, intraoperative transesophageal echocardiogram per anesthesia, Implantable Cardioverter Defibrillator (ICD) removal;  Surgeon: Ronnie Quigley MD;  Location: [facility] OR     No current facility-administered medications for this encounter.     Current Outpatient Medications   Medication Sig Dispense Refill    acetaminophen (TYLENOL) 325 MG tablet Take 3 tablets (975 mg) by mouth every 8 hours as needed for mild pain 100 tablet 0    albuterol (PROAIR HFA/PROVENTIL HFA/VENTOLIN HFA) 108 (90 Base) MCG/ACT inhaler INSTILL 2 INHALATIONS BY MOUTH  EVERY 4 HOURS AS NEEDED FOR  SHORTNESS OF BREATH`, WHEEZING  OR COUGH 40.2 g 0    allopurinol (ZYLOPRIM) 300 MG tablet TAKE 1 TABLET BY MOUTH DAILY 100 tablet 3    bumetanide (BUMEX) 1 MG tablet Take 3 tablets (3 mg) by mouth 2 times daily 540 tablet 3    buPROPion (WELLBUTRIN XL) 300 MG 24 hr tablet TAKE 1 TABLET BY MOUTH IN THE  MORNING 90 tablet 3    calcitRIOL (ROCALTROL) 0.25 MCG capsule Take 1 capsule (0.25 mcg) by mouth daily 90 capsule 3    calcium carbonate (TUMS) 500 MG chewable tablet Take 2 tablets (1,000 mg) by mouth 3 times daily (with meals) 300 tablet 3     cyanocobalamin (VITAMIN B-12) 1000 MCG tablet Take 1 tablet (1,000 mcg) by mouth daily 90 tablet 3    diphenhydrAMINE (BENADRYL) 25 MG tablet Take 25 mg by mouth every 6 hours as needed for itching      ferrous sulfate (FEROSUL) 325 (65 Fe) MG tablet Take 1 tablet (325 mg) by mouth 2 times daily (with meals) 60 tablet 6    gabapentin (NEURONTIN) 300 MG capsule Take 300 mg by mouth daily      ipratropium - albuterol 0.5 mg/2.5 mg/3 mL (DUONEB) 0.5-2.5 (3) MG/3ML neb solution Take 1 vial by nebulization 4 times daily as needed for shortness of breath, wheezing or cough      Melatonin 10 MG CAPS Take 1 capsule by mouth nightly as needed      montelukast (SINGULAIR) 10 MG tablet TAKE 1 TABLET BY MOUTH AT  BEDTIME 100 tablet 3    mycophenolate (GENERIC EQUIVALENT) 250 MG capsule Take 2 capsules (500 mg) by mouth 2 times daily 180 capsule 3    polyethylene glycol (MIRALAX) 17 GM/Dose powder Take 1 Capful by mouth daily as needed for constipation      rosuvastatin (CRESTOR) 10 MG tablet TAKE 1 TABLET BY MOUTH DAILY 90 tablet 1    tacrolimus (GENERIC EQUIVALENT) 0.5 MG capsule Take 1 capsule (0.5 mg) by mouth every morning With 3 mg capsules 90 capsule 3    tacrolimus (GENERIC EQUIVALENT) 1 MG capsule TAKE 3 CAPSULES BY MOUTh IN am and in pm. 180 capsule 3    traMADol (ULTRAM) 50 MG tablet TAKE ONE TABLET BY MOUTH EVERY MORNING AND AFTERNOON AND 2 AT BEDTIME AS NEEDED FOR PAIN      Vitamin D, Cholecalciferol, 25 MCG (1000 UT) CAPS Take 25 mcg by mouth daily 90 capsule 3    warfarin ANTICOAGULANT (COUMADIN) 2.5 MG tablet Take 2.5 mg daily or as directed by anticoagulation clinic 30 tablet 3     Allergies   Allergen Reactions    Grass Shortness Of Breath    Ace Inhibitors Cough    Cats     Dust Mites Other (See Comments)     Asthma    Mold Other (See Comments)     Asthma    Penicillins Other (See Comments)     Unknown - childhood exposure    Tolerated Zosyn 9/18-9/20/2020    Per patient report he has tolerated amoxicillin     Sulfa Antibiotics Other (See Comments) and Unknown     Unknown childhood reaction     Past medical history, past surgical history, medications, and allergies were reviewed with the patient. Additional pertinent items: None    Social History     Socioeconomic History    Marital status:      Spouse name: Not on file    Number of children: Not on file    Years of education: Not on file    Highest education level: Not on file   Occupational History    Not on file   Tobacco Use    Smoking status: Former     Current packs/day: 0.00     Types: Cigarettes     Quit date:      Years since quittin.6     Passive exposure: Never    Smokeless tobacco: Never   Vaping Use    Vaping status: Never Used   Substance and Sexual Activity    Alcohol use: Yes     Comment: on occasion    Drug use: No     Comment: tried gummy for anxiety    Sexual activity: Yes     Partners: Female   Other Topics Concern    Parent/sibling w/ CABG, MI or angioplasty before 65F 55M? No   Social History Narrative    Not on file     Social Determinants of Health     Financial Resource Strain: High Risk (2023)    Financial Resource Strain     Within the past 12 months, have you or your family members you live with been unable to get utilities (heat, electricity) when it was really needed?: Yes   Food Insecurity: Low Risk  (2023)    Food Insecurity     Within the past 12 months, did you worry that your food would run out before you got money to buy more?: No     Within the past 12 months, did the food you bought just not last and you didn t have money to get more?: No   Transportation Needs: Low Risk  (2023)    Transportation Needs     Within the past 12 months, has lack of transportation kept you from medical appointments, getting your medicines, non-medical meetings or appointments, work, or from getting things that you need?: No   Physical Activity: Not on file   Stress: Not on file   Social Connections: Not on file    Interpersonal Safety: Not At Risk (8/23/2024)    Received from [facility]     Humiliation, Afraid, Rape, and Kick questionnaire     Fear of Current or Ex-Partner: No     Emotionally Abused: No     Physically Abused: No     Sexually Abused: No   Housing Stability: Low Risk  (9/26/2023)    Housing Stability     Do you have housing? : Yes     Are you worried about losing your housing?: No     Social history was reviewed with the patient. Additional pertinent items: None    Review of Systems  A medically appropriate review of systems was performed with pertinent positives and negatives noted in the HPI, and all other systems negative.    Physical Exam   BP: 104/81  Pulse: (!) 128  Temp: 98.1  F (36.7  C)  Resp: 18  SpO2: 100 %      General: Well nourished, well developed, NAD  HEENT: EOMI, anicteric. NCAT, MMM  Neck: no jugular venous distension, supple, nl ROM  Cardiac: Cardiac rate, regular rhythm. No murmurs, rubs, or gallops. Normal S1, S2.  Intact peripheral pulses  Pulm: Mild wheeze left lower lung field, no rales or rhonchi  Abd: Soft, nontender, nondistended.  No masses palpated.    Skin: Warm and dry to the touch.  No rash  Extremities: No LE edema, no cyanosis, w/w/p  Neuro: A&Ox3, no gross focal deficits    ED Course        Procedures                      Labs Ordered and Resulted from Time of ED Arrival to Time of ED Departure - No data to display         Results for orders placed or performed during the hospital encounter of 08/23/24 (from the past 24 hour(s))   CBC with Platelets & Differential    Narrative    The following orders were created for panel order CBC with Platelets & Differential.  Procedure                               Abnormality         Status                     ---------                               -----------         ------                     CBC with platelets and d...[[id number]]                                                   Please view results for these tests on  the individual orders.     *Note: Due to a large number of results and/or encounters for the requested time period, some results have not been displayed. A complete set of results can be found in Results Review.       Labs, vital signs, and imaging studies were reviewed by me.    Medications   lidocaine 1 % 0.1-1 mL (has no administration in time range)   lidocaine (LMX4) cream (has no administration in time range)   sodium chloride (PF) 0.9% PF flush 3 mL (has no administration in time range)   sodium chloride (PF) 0.9% PF flush 3 mL (has no administration in time range)   medication instruction (has no administration in time range)   acetaminophen (TYLENOL) tablet 650 mg (has no administration in time range)   acetaminophen (TYLENOL) Suppository 650 mg (has no administration in time range)   Patient is already receiving anticoagulation with heparin, enoxaparin (LOVENOX), warfarin (COUMADIN)  or other anticoagulant medication (has no administration in time range)   senna-docusate (SENOKOT-S/PERICOLACE) 8.6-50 MG per tablet 1 tablet (has no administration in time range)     Or   senna-docusate (SENOKOT-S/PERICOLACE) 8.6-50 MG per tablet 2 tablet (has no administration in time range)   polyethylene glycol (MIRALAX) Packet 17 g (has no administration in time range)   ipratropium - albuterol 0.5 mg/2.5 mg/3 mL (DUONEB) neb solution 3 mL (3 mLs Nebulization $Given 8/23/24 [id number])       Assessments & Plan (with Medical Decision Making)   Jim Willingham is a 69 year old male who presents to the emergency department with shortness of breath.  Patient had workup at outside hospital ER earlier today, concerning for CHF exacerbation and WALTER.  EKG ordered here.    Patient was discussed with cardiology, they will come see the patient in the emergency department and plan to admit him.    Critical care was not performed.     Medical Decision Making  The patient's presentation was of high complexity (a chronic illness severe  exacerbation, progression, or side effect of treatment).    The patient's evaluation involved:  review of external note(s) from 1 sources (outside hospital ER)  review of 3+ test result(s) ordered prior to this encounter (see separate area of note for details)  discussion of management or test interpretation with another health professional (cardiology)    The patient's management necessitated moderate risk (prescription drug management including medications given in the ED) and high risk (a decision regarding hospitalization).    I have reviewed the nursing notes.    I have reviewed the findings, diagnosis, plan and need for follow up with the patient.    Patient and their further management were discussed with cardiology, to be admitted to their service. Plan was discussed with patient who understands and agrees with plan.    New Prescriptions    No medications on file       Final diagnoses:   Acute congestive heart failure, unspecified heart failure type (H)       GLORY ELLIOTT MD  8/23/2024   [facility] EMERGENCY DEPARTMENT       Glory Elliott MD  08/23/24 [id number]

## 2024-08-24 NOTE — PHARMACY-ANTICOAGULATION SERVICE
Clinical Pharmacy - Warfarin Dosing Consult     Pharmacy has been consulted to manage this patient s warfarin therapy.  Indication: DVT/ PE Treatment  Therapy Goal: INR 2-3  OP Anticoag Clinic: [facility] anticoagulation clinic  Warfarin Prior to Admission: Yes  Warfarin PTA Regimen: 2.5mg Wed, Sat and 3.75mg all other days per anticoagulation clinic 8/19/24  Recent documented change in oral intake/nutrition: Unknown    INR   Date Value Ref Range Status   08/24/2024 1.95 (H) 0.85 - 1.15 Final   08/23/2024 1.90 (H) 0.85 - 1.15 Final       Recommend warfarin 3.75 mg today.  Pharmacy will monitor Jim Willingham daily and order warfarin doses to achieve specified goal.      Please contact pharmacy as soon as possible if the warfarin needs to be held for a procedure or if the warfarin goals change.      Sorin Simons, PharmD, BCPS

## 2024-08-24 NOTE — PROGRESS NOTES
[facility]  CARDIOLOGY HEART FAILURE SERVICE (CARDS II) PROGRESS NOTE    Patient Name: Jim Willingham    Medical Record Number: [id number]    YOB: 1955 69 year old   PCP: Ricardo Gómez    Admit Date/Time: 8/23/2024 10:45 PM   1    Assessment and Plan:  Jim Willingham is a 69-year-old male with PMH including CHF, CKD, prior gastric bypass, anemia, COPD, depression, NICM (s/p HM2 LVAD 6/2017), and s/p OHT 5/6/21. He was admitted for acute heart failure exacerbation and WALTER on CKD.    #Acute on CHF Exacerbation  #s/p OHT 5/6/21  There is no history of rejection. Troponin is flat, decreasing from 156 to 152. NT Pro BNP was elevated at 28,847. The EKG did not show any acute ST or T wave changes. The echo today is unchanged from the previous one. His weight today is 195 lbs, with EDW of 185 lbs.    Volume: Hypervolemic. Bumex 4 mg, then Bumex gtt 1 mg/hr.  Continue immunosuppressant regimen: Tacrolimus (goal 4-6),  MG BID   PPx:  CAV: Aspirin 81mg daily and rosuvastatin 10mg daily.  GI: Pantoprazole 40mg daily  Osteoporosis: Calcium/vitamin D supplements    #WALTER on CKD IV/V  He follows with nephrology as an outpatient and has attended a kidney smart program. The patient previously expressed interest in PD. Creatinine on admission was 4.68.    - Nephrology consult for possible hemodialysis  - BMP daily  - Diuretics as above  - Continue PTA calcium and vitamin D supplementation    #COPD, possible exacerbation  He is maintaining O2 sats on room air and had an isolated episode of a productive cough. The patient received DuoNeb at OSH with significant improvement in symptoms. He is afebrile. The respiratory panel is negative. Therefore, we will hold off on antibiotics and steroids for now.    - Continue DuoNebs 4 times daily as needed for wheezing/dyspnea  - Continue PTA albuterol inhaler every 4 hours as needed  - Continue PTA Singulair 10 mg nightly    #Hx of  pulmonary embolism  Patient had V/Q scan on 5/20/2024 that showed PE.  This appeared to be unprovoked and therefore the patient was started on lifelong anticoagulation with warfarin.  He follows with anticoagulation clinic outpatient.  - Warfarin, Pharmacy dosed  - PT/INR daily  FEN: Cardiac diet, fluid restriction to 1.8L  PPX: Warfarin   Code Status: Full CODE  Pt was discussed and evaluated with Dr. Xiomara Carrera MD, attending physician, who agrees with the assessment and plan above.     Chely Wynn  Internal Medicine Resident (PGY1)    Review Of Systems  A 4-point ROS was negative aside from those listed above.    OBJECTIVE FINDINGS:  Temp:  [97.7  F (36.5  C)-98.4  F (36.9  C)] 97.7  F (36.5  C)  Pulse:  [118-131] 127  Resp:  [18] 18  BP: ()/(59-94) 109/82  SpO2:  [97 %-100 %] 100 %    Gen: AA&Ox3, no acute distress  HEENT:AT/ NC, PERRL b/l, EOM grossly intact, mucous membranes pink, moist without plaque or exudate  PULM/THORAX: Good air movement throughout, trace end expiratory wheezing at the bilateral lung bases.  CV: Tachycardic with regular rhythm S1 and S2 appreciated, no extra heart sounds, murmurs or rub auscultated. No JVD  ABD: Obese, soft, nontender,distended  EXT: Trace lower extremity edema  NEURO: no focal lesion  Intake/Output Summary (Last 24 hours) at 8/24/2024 1755  Last data filed at 8/24/2024 1300  Gross per 24 hour   Intake 486 ml   Output 2050 ml   Net -1564 ml     Wt Readings from Last 5 Encounters:   08/24/24 88.6 kg (195 lb 4.8 oz)   08/23/24 89.4 kg (197 lb)   08/19/24 88.3 kg (194 lb 9.6 oz)   08/12/24 86.2 kg (190 lb)   07/12/24 92.1 kg (203 lb)     All others:  Current Facility-Administered Medications   Medication Dose Route Frequency Provider Last Rate Last Admin    acetaminophen (TYLENOL) tablet 650 mg  650 mg Oral Q4H PRN Martínez Locke MD        Or    acetaminophen (TYLENOL) Suppository 650 mg  650 mg Rectal Q4H PRN Martínez Locke MD        albuterol  (PROVENTIL HFA/VENTOLIN HFA) inhaler  1 puff Inhalation Q4H PRN Martínez Locke MD        allopurinol (ZYLOPRIM) tablet 100 mg  100 mg Oral Daily Xiomara Carrera MD        bumetanide (BUMEX) 0.25 mg/mL infusion  1 mg/hr Intravenous Continuous Blanco Serrano MD 4 mL/hr at 08/24/24 1705 1 mg/hr at 08/24/24 1705    buPROPion (WELLBUTRIN XL) 24 hr tablet 300 mg  300 mg Oral QAM Martínez Locke MD   300 mg at 08/24/24 0912    calcitRIOL (ROCALTROL) capsule 0.25 mcg  0.25 mcg Oral Daily Martínez Locke MD   0.25 mcg at 08/24/24 0916    calcium carbonate (TUMS) chewable tablet 1,000 mg  1,000 mg Oral TID w/meals Martínez Locke MD   1,000 mg at 08/24/24 1711    ferrous sulfate (FEROSUL) tablet 325 mg  325 mg Oral BID w/meals Martínez Locke MD   325 mg at 08/24/24 1711    ipratropium - albuterol 0.5 mg/2.5 mg/3 mL (DUONEB) neb solution 3 mL  1 vial Nebulization 4x Daily PRN Martínez Locke MD   3 mL at 08/24/24 1732    lidocaine (LMX4) cream   Topical Q1H PRN Martínez Barth MD        lidocaine 1 % 0.1-1 mL  0.1-1 mL Other Q1H PRN Martínez Barth MD        medication instruction   Does not apply Continuous PRN Martínez Barth MD        melatonin tablet 10 mg  10 mg Oral At Bedtime PRN Martínez Locke MD   10 mg at 08/24/24 0157    montelukast (SINGULAIR) tablet 10 mg  10 mg Oral At Bedtime Martínez Locke MD   10 mg at 08/24/24 0250    mycophenolate (GENERIC EQUIVALENT) capsule 500 mg  500 mg Oral BID Martínez Locke MD   500 mg at 08/24/24 0916    Patient is already receiving anticoagulation with heparin, enoxaparin (LOVENOX), warfarin (COUMADIN)  or other anticoagulant medication   Does not apply Continuous PRN Martínez Locke MD        polyethylene glycol (MIRALAX) Packet 17 g  17 g Oral BID PRN Martínez Locke MD        rosuvastatin (CRESTOR) tablet 10 mg  10 mg Oral Daily Martínez Locke MD   10 mg at 08/24/24  0915    senna-docusate (SENOKOT-S/PERICOLACE) 8.6-50 MG per tablet 1 tablet  1 tablet Oral BID PRN Martínez Locke MD        Or    senna-docusate (SENOKOT-S/PERICOLACE) 8.6-50 MG per tablet 2 tablet  2 tablet Oral BID PRN Martínez Locke MD        sodium chloride (PF) 0.9% PF flush 3 mL  3 mL Intracatheter Q8H Martínez Barth MD   3 mL at 08/24/24 1711    sodium chloride (PF) 0.9% PF flush 3 mL  3 mL Intracatheter q1 min prn Martínez Barth MD        tacrolimus (GENERIC EQUIVALENT) capsule 3.5 mg  3.5 mg Oral BID Xiomara Carrera MD   3.5 mg at 08/24/24 0912    Vitamin D3 (CHOLECALCIFEROL) tablet 25 mcg  25 mcg Oral Daily Martínez Locke MD   25 mcg at 08/24/24 0916    Warfarin Dose Required Daily - Pharmacist Managed  1 each Oral See Admin Instructions Chely Wynn MD         Current Outpatient Medications   Medication Sig Dispense Refill    acetaminophen (TYLENOL) 325 MG tablet Take 3 tablets (975 mg) by mouth every 8 hours as needed for mild pain 100 tablet 0    albuterol (PROAIR HFA/PROVENTIL HFA/VENTOLIN HFA) 108 (90 Base) MCG/ACT inhaler INSTILL 2 INHALATIONS BY MOUTH  EVERY 4 HOURS AS NEEDED FOR  SHORTNESS OF BREATH`, WHEEZING  OR COUGH 40.2 g 0    allopurinol (ZYLOPRIM) 300 MG tablet TAKE 1 TABLET BY MOUTH DAILY 100 tablet 3    bumetanide (BUMEX) 1 MG tablet Take 3 tablets (3 mg) by mouth 2 times daily 540 tablet 3    buPROPion (WELLBUTRIN XL) 300 MG 24 hr tablet TAKE 1 TABLET BY MOUTH IN THE  MORNING 90 tablet 3    calcitRIOL (ROCALTROL) 0.25 MCG capsule Take 1 capsule (0.25 mcg) by mouth daily 90 capsule 3    calcium carbonate (TUMS) 500 MG chewable tablet Take 2 tablets (1,000 mg) by mouth 3 times daily (with meals) 300 tablet 3    cyanocobalamin (VITAMIN B-12) 1000 MCG tablet Take 1 tablet (1,000 mcg) by mouth daily 90 tablet 3    diphenhydrAMINE (BENADRYL) 25 MG tablet Take 25 mg by mouth every 6 hours as needed for itching      ferrous sulfate (FEROSUL)  325 (65 Fe) MG tablet Take 1 tablet (325 mg) by mouth 2 times daily (with meals) 60 tablet 6    gabapentin (NEURONTIN) 300 MG capsule Take 300 mg by mouth daily      ipratropium - albuterol 0.5 mg/2.5 mg/3 mL (DUONEB) 0.5-2.5 (3) MG/3ML neb solution Take 1 vial by nebulization 4 times daily as needed for shortness of breath, wheezing or cough      Melatonin 10 MG CAPS Take 1 capsule by mouth nightly as needed      montelukast (SINGULAIR) 10 MG tablet TAKE 1 TABLET BY MOUTH AT  BEDTIME 100 tablet 3    mycophenolate (GENERIC EQUIVALENT) 250 MG capsule Take 2 capsules (500 mg) by mouth 2 times daily 180 capsule 3    polyethylene glycol (MIRALAX) 17 GM/Dose powder Take 1 Capful by mouth daily as needed for constipation      rosuvastatin (CRESTOR) 10 MG tablet TAKE 1 TABLET BY MOUTH DAILY 90 tablet 1    tacrolimus (GENERIC EQUIVALENT) 0.5 MG capsule Take 1 capsule (0.5 mg) by mouth every morning With 3 mg capsules 90 capsule 3    tacrolimus (GENERIC EQUIVALENT) 1 MG capsule TAKE 3 CAPSULES BY MOUTh IN am and in pm. 180 capsule 3    traMADol (ULTRAM) 50 MG tablet TAKE ONE TABLET BY MOUTH EVERY MORNING AND AFTERNOON AND 2 AT BEDTIME AS NEEDED FOR PAIN      Vitamin D, Cholecalciferol, 25 MCG (1000 UT) CAPS Take 25 mcg by mouth daily 90 capsule 3    warfarin ANTICOAGULANT (COUMADIN) 2.5 MG tablet Take 2.5 mg daily or as directed by anticoagulation clinic 30 tablet 3        LABS Reviewed  IMAGES Reviewed

## 2024-08-24 NOTE — H&P
Cardiology History and Physical  Jim Willingham MRN: [id number]  Age: 69 year old, : 1955  Primary care provider: Ricardo Gómez           Chief Complaint:     Shortness of breath           History of Present Illness:     History is obtained from the patient     Jim Willingham is a 69 year old male with PMH including CHF, CKD, prior gastric bypass, anemia, COPD, depression, NICM (s/p HM2 LVAD 2017) s/p OHT 21.  The patient reports that his wife and granddaughter have been ill over the past few weeks with cough and cold symptoms.  He noticed a slight cough for about a week and had 1 episode where he coughed up a large amount of yellow sputum but cough has otherwise been nonproductive.  Patient also reports that he had a high salt meal yesterday, and noticed an increase to his weight as well as mildly increased dyspnea.  He states that his baseline weight is around 185 pounds. The patient denies having any fevers, chills, chest pain, or leg swelling.  He notes that fluid tends to build up in his belly instead of his legs, but does not feel like his abdomen is markedly distended.  He denies any nausea, vomiting, or diarrhea.  The patient first went to urgent care for further evaluation of his shortness of breath, and was told to go to the ER.  He subsequently went to [place] [facility].  There he had Covid testing which was negative but was noted to have wheezing so he was given a DuoNeb with significant symptomatic improvement.  His BNP was elevated at 992 and creatinine was elevated at 4.96.  BUN was also 104.  The patient was transferred to [facility] for further evaluation and management.            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    Generalized osteoarthritis 09/26/2023    Gouty arthropathy, chronic, without tophi 11/02/2020    H/O adenomatous polyp of colon 08/03/2016    2 polyps    H/O gastric bypass 05/11/2017    History of type 2 diabetes mellitus 03/28/2023    Hyperlipidemia LDL goal <70 09/27/2022    ICD (implantable cardioverter-defibrillator), biventricular, in situ 05/11/2017    IFG (impaired fasting glucose) 09/26/2023    LVAD (left ventricular assist device) present (H)     Major depression, recurrent, chronic (H24) 11/02/2020    Mild anemia 03/18/2022    NICM (nonischemic cardiomyopathy) (H)/ EF 20% 05/11/2017    ECHO: LVEDd. 7.66 cm, Restrictive pattern , Severe mitral valve regurgitation    CECILIA (obstructive sleep apnea) 05/11/2017    Paroxysmal atrial fibrillation (H) 05/11/2017    Paroxysmal VT (H) 05/11/2017    Peripheral polyneuropathy 03/28/2023    Postsurgical dumping syndrome 03/21/2024    Pulmonary cavitary lesion 11/18/2021    Rotator cuff tear arthropathy of both shoulders 11/02/2020    Type 2 diabetes mellitus with diabetic polyneuropathy (H) 03/18/2022    Uncomplicated asthma     Vitamin B12 deficiency (non anemic) 05/11/2017              Past Surgical History:      Past Surgical History:   Procedure Laterality Date    ANESTHESIA CARDIOVERSION N/A 05/11/2020    Procedure: ANESTHESIA, FOR CARDIOVERSION @1100;  Surgeon: GENERIC ANESTHESIA PROVIDER;  Location: [facility] OR    BRONCHOSCOPY (RIGID OR FLEXIBLE), DIAGNOSTIC N/A 8/30/2021    Procedure: BRONCHOSCOPY, WITH BRONCHOALVEOLAR LAVAGE;  Surgeon: Perlman, David Morris, MD;  Location: [facility] GI    COLONOSCOPY N/A 4/18/2024    Procedure: COLONOSCOPY, WITH POLYPECTOMY;  Surgeon: Jermaine Root MD;   Location: [facility]    CV CORONARY ANGIOGRAM N/A 5/17/2022    Procedure: Coronary Angiogram;  Surgeon: Jeffrey Gibson MD;  Location:  HEART CARDIAC CATH LAB    CV CORONARY ANGIOGRAM N/A 5/23/2023    Procedure: Coronary Angiogram;  Surgeon: Scout Robins MD;  Location:  HEART CARDIAC CATH LAB    CV HEART BIOPSY N/A 5/13/2021    Procedure: Heart Biopsy;  Surgeon: Scout Robins MD;  Location:  HEART CARDIAC CATH LAB    CV HEART BIOPSY N/A 5/20/2021    Procedure: Heart Biopsy;  Surgeon: Jeffrey Gibson MD;  Location:  HEART CARDIAC CATH LAB    CV HEART BIOPSY N/A 5/27/2021    Procedure: Heart Biopsy;  Surgeon: Jac Dover MD;  Location:  HEART CARDIAC CATH LAB    CV HEART BIOPSY N/A 6/7/2021    Procedure: CV HEART BIOPSY;  Surgeon: Jeffrey Gibson MD;  Location:  HEART CARDIAC CATH LAB    CV HEART BIOPSY N/A 6/21/2021    Procedure: CV HEART BIOPSY;  Surgeon: Scout Robins MD;  Location:  HEART CARDIAC CATH LAB    CV HEART BIOPSY N/A 7/5/2021    Procedure: CV HEART BIOPSY;  Surgeon: Jeffrey Gibson MD;  Location:  HEART CARDIAC CATH LAB    CV HEART BIOPSY N/A 7/16/2021    Procedure: Heart Biopsy;  Surgeon: Amadeo Art MD;  Location:  HEART CARDIAC CATH LAB    CV HEART BIOPSY N/A 8/5/2021    Procedure: Heart Biopsy;  Surgeon: Jeffrey Gibson MD;  Location:  HEART CARDIAC CATH LAB    CV HEART BIOPSY N/A 8/31/2021    Procedure: Heart Cath Heart Biopsy;  Surgeon: Jeffrey Gibson MD;  Location:  HEART CARDIAC CATH LAB    CV HEART BIOPSY N/A 9/21/2021    Procedure: CV HEART BIOPSY;  Surgeon: Jeffrey Gibson MD;  Location:  HEART CARDIAC CATH LAB    CV HEART BIOPSY N/A 10/5/2021    Procedure: CV HEART BIOPSY;  Surgeon: Jeffrey Gibson MD;  Location:  HEART CARDIAC CATH LAB    CV HEART BIOPSY N/A 11/4/2021    Procedure: CV HEART BIOPSY;  Surgeon:  Nicola Seth MD;  Location:  HEART CARDIAC CATH LAB    CV HEART BIOPSY N/A 5/17/2022    Procedure: Heart Biopsy;  Surgeon: Jeffrey Gibson MD;  Location:  HEART CARDIAC CATH LAB    CV HEART BIOPSY N/A 5/23/2023    Procedure: Heart Biopsy;  Surgeon: Scout Robins MD;  Location:  HEART CARDIAC CATH LAB    CV HEART BIOPSY N/A 5/23/2024    Procedure: Heart Biopsy;  Surgeon: Precious Bautista MD;  Location:  HEART CARDIAC CATH LAB    CV RIGHT HEART CATH MEASUREMENTS RECORDED N/A 07/24/2019    Procedure: CV RIGHT HEART CATH;  Surgeon: Renu Sears MD;  Location:  HEART CARDIAC CATH LAB    CV RIGHT HEART CATH MEASUREMENTS RECORDED N/A 08/05/2020    Procedure: CV RIGHT HEART CATH;  Surgeon: Nicola Seth MD;  Location:  HEART CARDIAC CATH LAB    CV RIGHT HEART CATH MEASUREMENTS RECORDED N/A 01/07/2021    Procedure: CV RIGHT HEART CATH;  Surgeon: Jac Dover MD;  Location:  HEART CARDIAC CATH LAB    CV RIGHT HEART CATH MEASUREMENTS RECORDED N/A 02/23/2021    Procedure: Heart Cath Right Heart Cath;  Surgeon: Jeffrey Gibson MD;  Location:  HEART CARDIAC CATH LAB    CV RIGHT HEART CATH MEASUREMENTS RECORDED N/A 03/23/2021    Procedure: Heart Cath Right Heart Cath. request for 3/23;  Surgeon: Jeffrey Gibson MD;  Location:  HEART CARDIAC CATH LAB    CV RIGHT HEART CATH MEASUREMENTS RECORDED N/A 5/13/2021    Procedure: Right Heart Cath;  Surgeon: Scout Robins MD;  Location:  HEART CARDIAC CATH LAB    CV RIGHT HEART CATH MEASUREMENTS RECORDED N/A 5/20/2021    Procedure: Right Heart Cath;  Surgeon: Jeffrey Gibson MD;  Location:  HEART CARDIAC CATH LAB    CV RIGHT HEART CATH MEASUREMENTS RECORDED N/A 5/27/2021    Procedure: Right Heart Cath;  Surgeon: Jac Dover MD;  Location:  HEART CARDIAC CATH LAB    CV RIGHT HEART CATH MEASUREMENTS RECORDED N/A 6/7/2021    Procedure: CV RIGHT HEART CATH;  Surgeon:  Jeffrey Gibson MD;  Location:  HEART CARDIAC CATH LAB    CV RIGHT HEART CATH MEASUREMENTS RECORDED N/A 6/21/2021    Procedure: CV RIGHT HEART CATH;  Surgeon: Scout Robins MD;  Location:  HEART CARDIAC CATH LAB    CV RIGHT HEART CATH MEASUREMENTS RECORDED N/A 7/5/2021    Procedure: CV RIGHT HEART CATH;  Surgeon: Jeffrey Gibson MD;  Location:  HEART CARDIAC CATH LAB    CV RIGHT HEART CATH MEASUREMENTS RECORDED N/A 7/16/2021    Procedure: Right Heart Cath;  Surgeon: Amadeo Art MD;  Location:  HEART CARDIAC CATH LAB    CV RIGHT HEART CATH MEASUREMENTS RECORDED N/A 8/5/2021    Procedure: CV RIGHT HEART CATH;  Surgeon: Jeffrey Gibson MD;  Location:  HEART CARDIAC CATH LAB    CV RIGHT HEART CATH MEASUREMENTS RECORDED N/A 8/31/2021    Procedure: Heart Cath Right Heart Cath;  Surgeon: Jeffrey Gibson MD;  Location:  HEART CARDIAC CATH LAB    CV RIGHT HEART CATH MEASUREMENTS RECORDED N/A 9/21/2021    Procedure: CV RIGHT HEART CATH;  Surgeon: Jeffrey Gibson MD;  Location:  HEART CARDIAC CATH LAB    CV RIGHT HEART CATH MEASUREMENTS RECORDED N/A 10/5/2021    Procedure: CV RIGHT HEART CATH;  Surgeon: Jeffrey Gibson MD;  Location:  HEART CARDIAC CATH LAB    CV RIGHT HEART CATH MEASUREMENTS RECORDED N/A 11/4/2021    Procedure: CV RIGHT HEART CATH;  Surgeon: Nicola Seth MD;  Location:  HEART CARDIAC CATH LAB    CV RIGHT HEART CATH MEASUREMENTS RECORDED N/A 5/17/2022    Procedure: Right Heart Catheterization;  Surgeon: Jeffrey Gibson MD;  Location:  HEART CARDIAC CATH LAB    CV RIGHT HEART CATH MEASUREMENTS RECORDED N/A 5/23/2023    Procedure: Right Heart Catheterization;  Surgeon: Scout Robins MD;  Location:  HEART CARDIAC CATH LAB    CV RIGHT HEART CATH MEASUREMENTS RECORDED N/A 5/23/2024    Procedure: Right Heart Cath- pre noon with rush path;  Surgeon: Precious Bautista,  MD;  Location:  HEART CARDIAC CATH LAB    GI SURGERY      [facility]    INSERT VENTRICULAR ASSIST DEVICE LEFT (HEARTMATE II) N/A 2017    Procedure: INSERT VENTRICULAR ASSIST DEVICE LEFT (HEARTMATE II);  Median Sternotomy Heartmate II Left Ventricular Assist Device Insertion on Pump Oxygenator;  Surgeon: Ronnie Quigley MD;  Location:  OR    IR CHEST TUBE PLACEMENT NON-TUNNELED RIGHT  2021    LAPAROSCOPY DIAGNOSTIC (GENERAL) N/A 2024    Procedure: Diagnostic Laparoscopy, Exploratory Laparotomy with Extensive lysis of adhesions.;  Surgeon: Frederick Montgomery MD;  Location:  OR    ORTHOPEDIC SURGERY      right knee wired    PICC DOUBLE LUMEN PLACEMENT Right 2020    5FR PICC DL. Length-43cm (1cm out).    PICC INSERTION - DOUBLE LUMEN Right 2021    IK/BRACH    RELEASE CARPAL TUNNEL BILATERAL Bilateral 2021    Procedure: Bilateral carpal tunnel release;  Surgeon: Jermaine Brand MD;  Location:  OR    TRANSPLANT HEART RECIPIENT N/A 2021    Procedure: Redo median sternotomy, lysis of adhesions, heart transplant recipient, on cardiopulmonary bypass, intraoperative transesophageal echocardiogram per anesthesia, Implantable Cardioverter Defibrillator (ICD) removal;  Surgeon: Ronnie Quigley MD;  Location:  OR              Social History:     Social History     Tobacco Use    Smoking status: Former     Current packs/day: 0.00     Types: Cigarettes     Quit date:      Years since quittin.6     Passive exposure: Never    Smokeless tobacco: Never   Substance Use Topics    Alcohol use: Yes     Comment: on occasion              Family History:     Family History   Problem Relation Age of Onset    Cerebrovascular Disease Mother 64    Diabetes Mother     Hypertension Mother     Coronary Artery Disease Father     Diabetes Type 2  Father     Obesity Brother     Obesity Brother     Cerebrovascular Disease Daughter 40     Family history reviewed and updated in [facility]           Allergies:     All allergies reviewed and addressed  Allergies   Allergen Reactions    Grass Shortness Of Breath    Ace Inhibitors Cough    Cats     Dust Mites Other (See Comments)     Asthma    Mold Other (See Comments)     Asthma    Penicillins Other (See Comments)     Unknown - childhood exposure    Tolerated Zosyn 9/18-9/20/2020    Per patient report he has tolerated amoxicillin    Sulfa Antibiotics Other (See Comments) and Unknown     Unknown childhood reaction              Medications:       Current Outpatient Medications   Medication Sig Dispense Refill    acetaminophen (TYLENOL) 325 MG tablet Take 3 tablets (975 mg) by mouth every 8 hours as needed for mild pain 100 tablet 0    albuterol (PROAIR HFA/PROVENTIL HFA/VENTOLIN HFA) 108 (90 Base) MCG/ACT inhaler INSTILL 2 INHALATIONS BY MOUTH  EVERY 4 HOURS AS NEEDED FOR  SHORTNESS OF BREATH`, WHEEZING  OR COUGH 40.2 g 0    allopurinol (ZYLOPRIM) 300 MG tablet TAKE 1 TABLET BY MOUTH DAILY 100 tablet 3    bumetanide (BUMEX) 1 MG tablet Take 3 tablets (3 mg) by mouth 2 times daily 540 tablet 3    buPROPion (WELLBUTRIN XL) 300 MG 24 hr tablet TAKE 1 TABLET BY MOUTH IN THE  MORNING 90 tablet 3    calcitRIOL (ROCALTROL) 0.25 MCG capsule Take 1 capsule (0.25 mcg) by mouth daily 90 capsule 3    calcium carbonate (TUMS) 500 MG chewable tablet Take 2 tablets (1,000 mg) by mouth 3 times daily (with meals) 300 tablet 3    cyanocobalamin (VITAMIN B-12) 1000 MCG tablet Take 1 tablet (1,000 mcg) by mouth daily 90 tablet 3    diphenhydrAMINE (BENADRYL) 25 MG tablet Take 25 mg by mouth every 6 hours as needed for itching      ferrous sulfate (FEROSUL) 325 (65 Fe) MG tablet Take 1 tablet (325 mg) by mouth 2 times daily (with meals) 60 tablet 6    gabapentin (NEURONTIN) 300 MG capsule Take 300 mg by mouth daily      ipratropium - albuterol 0.5 mg/2.5 mg/3 mL (DUONEB) 0.5-2.5 (3) MG/3ML neb solution Take 1 vial by nebulization 4 times daily as needed for shortness of breath,  wheezing or cough      Melatonin 10 MG CAPS Take 1 capsule by mouth nightly as needed      montelukast (SINGULAIR) 10 MG tablet TAKE 1 TABLET BY MOUTH AT  BEDTIME 100 tablet 3    mycophenolate (GENERIC EQUIVALENT) 250 MG capsule Take 2 capsules (500 mg) by mouth 2 times daily 180 capsule 3    polyethylene glycol (MIRALAX) 17 GM/Dose powder Take 1 Capful by mouth daily as needed for constipation      rosuvastatin (CRESTOR) 10 MG tablet TAKE 1 TABLET BY MOUTH DAILY 90 tablet 1    tacrolimus (GENERIC EQUIVALENT) 0.5 MG capsule Take 1 capsule (0.5 mg) by mouth every morning With 3 mg capsules 90 capsule 3    tacrolimus (GENERIC EQUIVALENT) 1 MG capsule TAKE 3 CAPSULES BY MOUTh IN am and in pm. 180 capsule 3    traMADol (ULTRAM) 50 MG tablet TAKE ONE TABLET BY MOUTH EVERY MORNING AND AFTERNOON AND 2 AT BEDTIME AS NEEDED FOR PAIN      Vitamin D, Cholecalciferol, 25 MCG (1000 UT) CAPS Take 25 mcg by mouth daily 90 capsule 3    warfarin ANTICOAGULANT (COUMADIN) 2.5 MG tablet Take 2.5 mg daily or as directed by anticoagulation clinic 30 tablet 3                Physical Exam:     B/P: Data Unavailable, T: Data Unavailable, P: Data Unavailable, R: Data Unavailable    Wt Readings from Last 4 Encounters:   08/23/24 89.4 kg (197 lb)   08/19/24 88.3 kg (194 lb 9.6 oz)   08/12/24 86.2 kg (190 lb)   07/12/24 92.1 kg (203 lb)       No intake or output data in the 24 hours ending 08/23/24 5131    Gen: AA&Ox3, no acute distress  HEENT:AT/ NC, PERRL b/l, EOM grossly intact, mucous membranes pink, moist without plaque or exudate  BACK: no CVA tenderness, no midline bony tenderness  PULM/THORAX: Good air movement throughout, trace end expiratory wheezing at the bilateral lung bases.  CV: Tachycardic with regular rhythm S1 and S2 appreciated, no extra heart sounds, murmurs or rub auscultated. No JVD  ABD: Obese, soft, nontender, nondistended. Normoactive bowel sounds x 4, no HSM appreciated.  EXT: Trace lower extremity edema, but no  clubbing or cyanosis. No asymmetrical edema or tenderness to palpation in calves bilaterally.  NEURO: CN II-XII intact, strength 5/5 throughout    Lines  PIV           Data:     Labs Reviewed on Admission  Pertinent for:  Lab Results   Component Value Date    TROPI <0.015 07/08/2021    TROPI <0.015 06/28/2021    TROPI 0.018 08/05/2020    TROPI 0.025 01/21/2020    TROPI 0.044 01/20/2020    TROPONIN <0.015 09/27/2021    TROPONIN <0.015 08/24/2021         Most Recent Imaging:     Echo:   5/23/24  Interpretation Summary  Global and regional left ventricular function is normal with an EF of 55-60%.  Global right ventricular function is normal.  The right ventricle is normal size.  Dilation of the inferior vena cava is present with normal respiratory  variation in diameter.  No pericardial effusion is present.     This study was compared with the study from 5/20/2024 .IVC improved, otherwise  no change.           Assessment and Plan:     Jim Willingham is a 69 year old male with PMH including CHF, CKD, prior gastric bypass, anemia, COPD, depression, NICM (s/p HM2 LVAD 6/2017) s/p OHT 5/6/21 who was admitted for acute heart failure exacerbation and WALTER.    #CHF exacerbation   #S/p heart transplant   Patient endorses cough over the last week as well as mild dyspnea over the past day prior to admission.  Was evaluated at OSH and BNP was elevated at 992. The patient is on Bumex 3 mg twice daily at home and has been taking this, but reports eating a high salt meal  yesterday and gained approximately 5 pounds.  He was given 5 mg of metolazone at OSH and reports increased urine output since then. Initial troponin here was elevated at 156, but patient did not have any chest pain and remained hemodynamically stable.  EKG did not show any acute ST or T wave changes.  Elevated troponin likely represents increased demand in the setting of heart failure exacerbation. Notably, patient had similarly elevated troponins approximately  1.5 months ago. NT Pro BNP was elevated at 28,847, but this is difficult to interpret in the setting of worsening renal function. Overall, the patient's lungs are clear to auscultation on exam and his CXR did not show marked pulmonary vascular congestion.  Will continue the patient's home diuretics and can consider additional sequential blockade.    - Continue PTA Bumex 3 mg p.o. twice daily  - Can consider additional dose of 5 mg of metolazone for sequential blockade  - Repeat echocardiogram  - Trend troponin to peak  - Continue PTA tacrolimus 3 mg p.o. twice daily, with additional 0.5 mg every morning  - Tacrolimus trough level in the a.m.  - Continue  mg twice daily    #COPD, possible exacerbation   Patient reports distant smoking history but PFT in 2021 demonstrated severe airflow obstruction, consistent with COPD.  The patient notes that he has been coughing over the last week and has known sick contacts with his wife and granddaughter.  He is maintaining O2 sats on room air and had an isolated episode of productive cough.  Patient received DuoNeb at OSH with significant improvement in symptoms.  COPD exacerbation triggered by viral illness could certainly be contributing to patient's symptoms.  He is maintaining O2 sats on room air and has been afebrile.  Therefore, we will hold off on antibiotics and steroids for now.  - Continue DuoNebs 4 times daily as needed wheezing/dyspnea  - Continue PTA albuterol inhaler every 4 hours as needed  - Continue PTA Singulair 10 mg nightly    #WALTER on CKD IV/V  Patient's CKD felt to be secondary to CNI/CRS.  Patient previously had renal ultrasound that did not show hydronephrosis or any masses.  He follows with nephrology outpatient and has attended kidney smart program.  The patient previously expressed interest in PD.  Creatinine on admission was 4.68, increased from 3.95 on 8/19/2024.  Patient reports 5 pound increase in weight, which he thinks is secondary to fluid  retention from recent high salt diet.  There there is likely component of cardiorenal syndrome and patient received 5 mg of metolazone at OSH.  Will closely monitor volume status and assess for improvement in function.  The patient may require dialysis if he fails to respond to diuretics.  Electrolytes have been stable, though phosphorus is mildly elevated.  - BMP daily  - Diuretics as above  - Continue PTA calcium and vitamin D supplementation  - If no improvement in renal function with diuretics, can consider nephrology consult for consideration of dialysis    #Chronic anemia 2/2 renal disease   Patient has a history of chronic anemia likely secondary to renal disease and is on chronic oral iron supplementation at home.  Reportedly did not tolerate IV iron infusion in the past.  Per outpatient nephrology team, patient does not currently meet criteria for ALOK.   -Continue PTA iron supplementation twice daily    #Hx of pulmonary embolism  Patient had V/Q scan on 5/20/2024 that showed PE.  This appeared to be unprovoked and therefore the patient was started on lifelong anticoagulation with warfarin.  He follows with anticoagulation clinic outpatient.  - Continue PTA warfarin 2.5 mg daily  - PT/INR daily    FEN: Cardiac diet, fluid restriction to 1.8L  PPX: Warfarin     Code Status: Full CODE     Patient will be staffed in the morning. Please feel free to page with questions.    Martínez Locke MD   PGY-2 Internal Medicine  Cardiology Service

## 2024-08-24 NOTE — ED NOTES
Bed: ED20  Expected date:   Expected time:   Means of arrival: Ambulance  Comments:  Jim Willingham 69M cardiac tx with ckd with chf exacerbation and sanju taking on cards 2. Call cards 2 fellow.      Apryl Presley MD  08/23/24 2202

## 2024-08-24 NOTE — PROGRESS NOTES
D: 8/24 Acute congestive heart failure    I: Monitored vitals and assessed pt status.   Changed:Assumed care [id number]. Afebrile, VSS. BP sys soft 's. 's on RA. Congested cough, SOB with excacerbation, duoneb given. BM 8/23. Uses urinal with adequate output. Denies pain or nausea. Pt has been awake this morning waiting to order breakfast. No acute events.   Running: none  PRN: none      Temp:  [98.1  F (36.7  C)-98.4  F (36.9  C)] 98.4  F (36.9  C)  Pulse:  [118-130] 128  Resp:  [18] 18  BP: ()/(59-89) 108/89  SpO2:  [97 %-100 %] 100 %      P: Continue to monitor Pt status and report changes to treatment team. Diurese pt as ordered.

## 2024-08-24 NOTE — PLAN OF CARE
Neuro: A&Ox4.   Cardiac: Sinus tachycardic., Heart rate 110s to 120s. VSS.   Respiratory: Sating above 92% on RA.  GI/: Adequate urine output. BM X1  Diet/appetite: Tolerating low saturated fat Na <2400 mg diet. Eating well.  Activity:  Up independently   Pain: At acceptable level on current regimen.   Skin: No new deficits noted.  LDA's: PIV with Bumex infusing at 1 mg/hr  Plan: Continue with POC. Notify primary team with changes.  Goal Outcome Evaluation:      Plan of Care Reviewed With: patient

## 2024-08-24 NOTE — MEDICATION SCRIBE - ADMISSION MEDICATION HISTORY
Medication Scribe Admission Medication History    Admission medication history is complete. The information provided in this note is only as accurate as the sources available at the time of the update.    Information Source(s): Patient and CareEverywhere/SureScripts via in-person    Pertinent Information: None    Changes made to PTA medication list:  Added: None  Deleted: None  Changed: None    Allergies reviewed with patient and updates made in EHR: yes    Medication History Completed By: Megan Nair 8/24/2024 2:55 PM    PTA Med List   Medication Sig Note Last Dose    acetaminophen (TYLENOL) 325 MG tablet Take 3 tablets (975 mg) by mouth every 8 hours as needed for mild pain  Past Month    albuterol (PROAIR HFA/PROVENTIL HFA/VENTOLIN HFA) 108 (90 Base) MCG/ACT inhaler INSTILL 2 INHALATIONS BY MOUTH  EVERY 4 HOURS AS NEEDED FOR  SHORTNESS OF BREATH`, WHEEZING  OR COUGH  More than a month    allopurinol (ZYLOPRIM) 300 MG tablet TAKE 1 TABLET BY MOUTH DAILY  8/23/2024 at AM    bumetanide (BUMEX) 1 MG tablet Take 3 tablets (3 mg) by mouth 2 times daily  8/23/2024 at AM    buPROPion (WELLBUTRIN XL) 300 MG 24 hr tablet TAKE 1 TABLET BY MOUTH IN THE  MORNING  8/23/2024 at AM    calcitRIOL (ROCALTROL) 0.25 MCG capsule Take 1 capsule (0.25 mcg) by mouth daily  8/23/2024 at AM    calcium carbonate (TUMS) 500 MG chewable tablet Take 2 tablets (1,000 mg) by mouth 3 times daily (with meals)  8/23/2024 at AM    cyanocobalamin (VITAMIN B-12) 1000 MCG tablet Take 1 tablet (1,000 mcg) by mouth daily  8/23/2024 at AM    diphenhydrAMINE (BENADRYL) 25 MG tablet Take 25 mg by mouth every 6 hours as needed for itching  Past Week    ferrous sulfate (FEROSUL) 325 (65 Fe) MG tablet Take 1 tablet (325 mg) by mouth 2 times daily (with meals)  8/23/2024 at AM    gabapentin (NEURONTIN) 300 MG capsule Take 300 mg by mouth daily  8/23/2024 at AM    ipratropium - albuterol 0.5 mg/2.5 mg/3 mL (DUONEB) 0.5-2.5 (3) MG/3ML neb solution Take 1  vial by nebulization 4 times daily as needed for shortness of breath, wheezing or cough  More than a month    Melatonin 10 MG CAPS Take 1 capsule by mouth nightly as needed  8/22/2024 at PM    montelukast (SINGULAIR) 10 MG tablet TAKE 1 TABLET BY MOUTH AT  BEDTIME  8/22/2024 at PM    mycophenolate (GENERIC EQUIVALENT) 250 MG capsule Take 2 capsules (500 mg) by mouth 2 times daily  8/23/2024 at AM    polyethylene glycol (MIRALAX) 17 GM/Dose powder Take 1 Capful by mouth daily as needed for constipation  Past Month    rosuvastatin (CRESTOR) 10 MG tablet TAKE 1 TABLET BY MOUTH DAILY  8/23/2024 at AM    tacrolimus (GENERIC EQUIVALENT) 0.5 MG capsule Take 1 capsule (0.5 mg) by mouth every morning With 3 mg capsules  8/23/2024 at AM    tacrolimus (GENERIC EQUIVALENT) 1 MG capsule TAKE 3 CAPSULES BY MOUTh IN am and in pm.  8/23/2024 at AM    traMADol (ULTRAM) 50 MG tablet TAKE ONE TABLET BY MOUTH EVERY MORNING AND AFTERNOON AND 2 AT BEDTIME AS NEEDED FOR PAIN  More than a month    Vitamin D, Cholecalciferol, 25 MCG (1000 UT) CAPS Take 25 mcg by mouth daily  8/23/2024 at AM    warfarin ANTICOAGULANT (COUMADIN) 2.5 MG tablet Take 2.5 mg daily or as directed by anticoagulation clinic 8/24/2024: Patient taking 2.5 mg (1 tab) Wednesday and Saturday, then 3.75 mg (1.5 tabs) all other days 8/22/2024 at PM

## 2024-08-25 NOTE — PLAN OF CARE
"Neuro: A&Ox4. Call light appropriate.   Cardiac: SR. VSS. HR 120s. Denies chest pain.   BP 93/61 (BP Location: Right arm, Cuff Size: Adult Regular)   Pulse (!) 127   Temp 98.1  F (36.7  C) (Oral)   Resp 18   Ht 1.727 m (5' 8\")   Wt 86.6 kg (191 lb)   SpO2 98%   BMI 29.04 kg/m     Respiratory: Sating 98% on RA. SOB on exertion. Expiratory wheezing noted, PRN meds given. CXR and CT chest done. Sputum sample sent.  GI/: Adequate urine output on Bumex IV infusion, uses urinal.   Diet/appetite: Tolerating diet. Eating well.  Activity: Independent- SBA  Pain: At acceptable level on current regimen.   Skin: No new deficits noted.  LDA's: New Right PIV CDI-SL.     Plan: Continue diuresing. Continue with POC. Notify primary team with changes.      "

## 2024-08-25 NOTE — PLAN OF CARE
Goal Outcome Evaluation:      Plan of Care Reviewed With: patient    Overall Patient Progress: improvingOverall Patient Progress: improving    Outcome Evaluation: Plan    Float Nurse Care Coordinator  Covering for Unit [address]C  Phone [phone]

## 2024-08-25 NOTE — CONSULTS
Nephrology Initial Consult  August 25, 2024      Jim Willingham MRN:[id number] YOB: 1955  Date of Admission:8/23/2024  Primary care provider: Ricardo Gómez  Requesting physician: Xiomara Carrera MD    ASSESSMENT AND RECOMMENDATIONS:   Jim Willingham is a 69 year old male with PMH including CHF, CKD, prior gastric bypass, anemia, COPD, depression, NICM (s/p HM2 LVAD 6/2017) s/p OHT 5/6/21       # CKD stage V   CKD diagnosed long ago presumed from DM,HTN and cardiorenal from heart failure. Later creatinine increased following OHT and long term CNI use. Rapid progression of kidney dysfunction in the past six months from creatinine 1.58 and GFR 48 to creatinine to 4 to 4.5 and GFR < 15.Serologic work including c3, ,c4, dsDNA, ANCA, monoclonal screening were negative. 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 Renal imaging without obstruction. It is unclear raeson for rapid progression to ESRD. Suspect hemodynamic changes in the setting of CNI use. He has attended the [facility] program and is interested in PD. Current creatinine pretty much at baseline however anticipate the need  for RRT soon.  # BP/Volume  BP well controlled. Weight increased by 10 lbs last one week. CXR is equivocal for pulmonary congestion. Echo on 8/24/24 reported normal ,EF 55-60% and normal RV function.   #  Anemia of renal disease: HB at goal. Low iron studies. Did not tolerate IV iron. On PTA oral iron   # BMD : Hyperphosphatemia and secondary hyperparathyroidism    On PTA Calcitriol 0.25 mcg every day and TUMS 1gm tid with meal.  #S/p heart transplant ; on tacrolimus and MMF     Recommendation     - Agree with bumex gtt  - Suggest to have PD access surgery evaluation and placement soon as outpatient   - Continue PTA oral iron,no indication for ALOK therapy now  - Continue calcitriol 0.25 mcg po daily and TUMS 1 gm tds with meal  -  Monitor I/O and daily weight       ecommendations were communicated to primary team via Note    Seen and discussed with Dr. Calvin Miller MD  Division of Renal Disease and Hypertension  [facility]  [email or web address]  Vocera Web Console        REASON FOR CONSULT: CKD stage V    HISTORY OF PRESENT ILLNESS:  Admitting provider and nursing notes reviewed  Jim Willingham is a 69 year old male with PMH including CHF, CKD, prior gastric bypass, anemia, COPD, depression, NICM (s/p HM2 LVAD 6/2017) s/p OHT 5/6/21.  The patient reports that his wife and granddaughter have been ill over the past few weeks with cough and cold symptoms.  He noticed a slight cough for about a week and had 1 episode where he coughed up a large amount of yellow sputum but cough has otherwise been nonproductive.  Patient also reports that he had a high salt meal yesterday, and noticed an increase to his weight as well as mildly increased dyspnea.  He states that his baseline weight is around 185 pounds, increased by 10 lbs.  The patient denies having any fevers, chills, chest pain, or leg swelling.  He notes that fluid tends to build up in his belly instead of his legs, but does not feel like his abdomen is markedly distended.  He denies any nausea, vomiting, or diarrhea.   Regarding the kidney,he has nephrology clinic follow.He has attended the [facility] program and is interested in PD. No change in UO. No symptoms of uremia including change in appetite,metallic taste, nausea vomiting or fatigue.     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    Generalized osteoarthritis 09/26/2023    Gouty arthropathy, chronic, without tophi 11/02/2020    H/O adenomatous polyp of colon 08/03/2016    2 polyps    H/O gastric bypass 05/11/2017    History of type 2 diabetes mellitus 03/28/2023    Hyperlipidemia LDL goal <70 09/27/2022    ICD (implantable cardioverter-defibrillator), biventricular, in situ 05/11/2017    IFG (impaired fasting glucose) 09/26/2023    LVAD (left ventricular assist device) present (H)     Major depression, recurrent, chronic (H24) 11/02/2020    Mild anemia 03/18/2022    NICM (nonischemic cardiomyopathy) (H)/ EF 20% 05/11/2017    ECHO: LVEDd. 7.66 cm, Restrictive pattern , Severe mitral valve regurgitation    CECILIA (obstructive sleep apnea) 05/11/2017    Paroxysmal atrial fibrillation (H) 05/11/2017    Paroxysmal VT (H) 05/11/2017    Peripheral polyneuropathy 03/28/2023    Postsurgical dumping syndrome 03/21/2024    Pulmonary cavitary lesion 11/18/2021    Rotator cuff tear arthropathy of both shoulders 11/02/2020    Type 2 diabetes mellitus with diabetic polyneuropathy (H) 03/18/2022    Uncomplicated asthma     Vitamin B12 deficiency (non anemic) 05/11/2017       Past Surgical History:   Procedure Laterality Date    ANESTHESIA CARDIOVERSION N/A 05/11/2020    Procedure: ANESTHESIA, FOR CARDIOVERSION @1100;  Surgeon: GENERIC ANESTHESIA PROVIDER;  [facility]    BRONCHOSCOPY (RIGID OR FLEXIBLE), DIAGNOSTIC N/A 8/30/2021    Procedure: BRONCHOSCOPY, WITH BRONCHOALVEOLAR LAVAGE;  Surgeon: Perlman, David Morris, MD;  [facility]    COLONOSCOPY N/A 4/18/2024    Procedure: COLONOSCOPY, WITH POLYPECTOMY;  Surgeon: Jermaine Root MD;  [facility]    CV CORONARY ANGIOGRAM N/A 5/17/2022    Procedure: Coronary Angiogram;  Surgeon: Jeffrey Gibson MD;  Location:  HEART  CARDIAC CATH LAB    CV CORONARY ANGIOGRAM N/A 5/23/2023    Procedure: Coronary Angiogram;  Surgeon: Scout Robins MD;  Location:  HEART CARDIAC CATH LAB    CV HEART BIOPSY N/A 5/13/2021    Procedure: Heart Biopsy;  Surgeon: Scout Robins MD;  Location:  HEART CARDIAC CATH LAB    CV HEART BIOPSY N/A 5/20/2021    Procedure: Heart Biopsy;  Surgeon: Jeffrey Gibson MD;  Location:  [facility]ARDIAC CATH LAB    CV HEART BIOPSY N/A 5/27/2021    Procedure: Heart Biopsy;  Surgeon: Jac Dover MD;  Location:  HEART CARDIAC CATH LAB    CV HEART BIOPSY N/A 6/7/2021    Procedure: CV HEART BIOPSY;  Surgeon: Jeffrey Gibson MD;  Location:  HEART CARDIAC CATH LAB    CV HEART BIOPSY N/A 6/21/2021    Procedure: CV HEART BIOPSY;  Surgeon: Scout Robins MD;  Location:  HEART CARDIAC CATH LAB    CV HEART BIOPSY N/A 7/5/2021    Procedure: CV HEART BIOPSY;  Surgeon: Jeffrey Gibson MD;  Location:  HEART CARDIAC CATH LAB    CV HEART BIOPSY N/A 7/16/2021    Procedure: Heart Biopsy;  Surgeon: Amadeo Art MD;  Location:  HEART CARDIAC CATH LAB    CV HEART BIOPSY N/A 8/5/2021    Procedure: Heart Biopsy;  Surgeon: Jeffrey Gibson MD;  Location:  HE[facility] CARDIAC CATH LAB    CV HEART BIOPSY N/A 8/31/2021    Procedure: Heart Cath Heart Biopsy;  Surgeon: Jeffrey Gibson MD;  Location:  HEART CARDIAC CATH LAB    CV HEART BIOPSY N/A 9/21/2021    Procedure: CV HEART BIOPSY;  Surgeon: Jeffrey Gibson MD;  Location:  HEART CARDIAC CATH LAB    CV HEART BIOPSY N/A 10/5/2021    Procedure: CV HEART BIOPSY;  Surgeon: Jeffrey Gibson MD;  Location:  HEART CARDIAC CATH LAB    CV HEART BIOPSY N/A 11/4/2021    Procedure: CV HEART BIOPSY;  Surgeon: Nicola Seth MD;  Location:  HEART CARDIAC CATH LAB    CV HEART BIOPSY N/A 5/17/2022    Procedure: Heart Biopsy;  Surgeon: Jeffrey Gibson  MD Sue;  Location:  HEART CARDIAC CATH LAB    CV HEART BIOPSY N/A 5/23/2023    Procedure: Heart Biopsy;  Surgeon: Scout Robins MD;  Location:  HEART CARDIAC CATH LAB    CV HEART BIOPSY N/A 5/23/2024    Procedure: Heart Biopsy;  Surgeon: Precious Bautista MD;  Location:  HEART CARDIAC CATH LAB    CV RIGHT HEART CATH MEASUREMENTS RECORDED N/A 07/24/2019    Procedure: CV RIGHT HEART CATH;  Surgeon: Renu Sears MD;  Location:  HEART CARDIAC CATH LAB    CV RIGHT HEART CATH MEASUREMENTS RECORDED N/A 08/05/2020    Procedure: CV RIGHT HEART CATH;  Surgeon: Nicola Seth MD;  Location:  HEART CARDIAC CATH LAB    CV RIGHT HEART CATH MEASUREMENTS RECORDED N/A 01/07/2021    Procedure: CV RIGHT HEART CATH;  Surgeon: Jac Dover MD;  Location:  HEART CARDIAC CATH LAB    CV RIGHT HEART CATH MEASUREMENTS RECORDED N/A 02/23/2021    Procedure: Heart Cath Right Heart Cath;  Surgeon: Jeffrey Gibson MD;  Location:  HEART CARDIAC CATH LAB    CV RIGHT HEART CATH MEASUREMENTS RECORDED N/A 03/23/2021    Procedure: Heart Cath Right Heart Cath. request for 3/23;  Surgeon: Jeffrey Gibson MD;  Location:  HE[facility] CARDIAC CATH LAB    CV RIGHT HEART CATH MEASUREMENTS RECORDED N/A 5/13/2021    Procedure: Right Heart Cath;  Surgeon: Scout Robins MD;  Location:  HEART CARDIAC CATH LAB    CV RIGHT HEART CATH MEASUREMENTS RECORDED N/A 5/20/2021    Procedure: Right Heart Cath;  Surgeon: Jeffrey Gibson MD;  Location:  HEART CARDIAC CATH LAB    CV RIGHT HEART CATH MEASUREMENTS RECORDED N/A 5/27/2021    Procedure: Right Heart Cath;  Surgeon: Jac Dover MD;  Location:  HEART CARDIAC CATH LAB    CV RIGHT HEART CATH MEASUREMENTS RECORDED N/A 6/7/2021    Procedure: CV RIGHT HEART CATH;  Surgeon: Jeffrey Gibson MD;  Location:  HEART CARDIAC CATH LAB    CV RIGHT HEART CATH MEASUREMENTS RECORDED N/A 6/21/2021    Procedure:  CV RIGHT HEART CATH;  Surgeon: Scout Robins MD;  Location:  HEART CARDIAC CATH LAB    CV RIGHT HEART CATH MEASUREMENTS RECORDED N/A 7/5/2021    Procedure: CV RIGHT HEART CATH;  Surgeon: Jeffrey Gibson MD;  Location:  HEART CARDIAC CATH LAB    CV RIGHT HEART CATH MEASUREMENTS RECORDED N/A 7/16/2021    Procedure: Right Heart Cath;  Surgeon: Amadeo Art MD;  Location:  HEART CARDIAC CATH LAB    CV RIGHT HEART CATH MEASUREMENTS RECORDED N/A 8/5/2021    Procedure: CV RIGHT HEART CATH;  Surgeon: Jeffrey Gibson MD;  Location:  HEART CARDIAC CATH LAB    CV RIGHT HEART CATH MEASUREMENTS RECORDED N/A 8/31/2021    Procedure: Heart Cath Right Heart Cath;  Surgeon: Jeffrey Gibson MD;  Location:  HE[facility] CARDIAC CATH LAB    CV RIGHT HEART CATH MEASUREMENTS RECORDED N/A 9/21/2021    Procedure: CV RIGHT HEART CATH;  Surgeon: Jeffrey Gibson MD;  Location:  HE[facility] CARDIAC CATH LAB    CV RIGHT HEART CATH MEASUREMENTS RECORDED N/A 10/5/2021    Procedure: CV RIGHT HEART CATH;  Surgeon: Jeffrey Gibson MD;  Location:  HEART CARDIAC CATH LAB    CV RIGHT HEART CATH MEASUREMENTS RECORDED N/A 11/4/2021    Procedure: CV RIGHT HEART CATH;  Surgeon: Nicola Seth MD;  Location:  HEART CARDIAC CATH LAB    CV RIGHT HEART CATH MEASUREMENTS RECORDED N/A 5/17/2022    Procedure: Right Heart Catheterization;  Surgeon: Jeffrey Gibson MD;  Location:  HEART CARDIAC CATH LAB    CV RIGHT HEART CATH MEASUREMENTS RECORDED N/A 5/23/2023    Procedure: Right Heart Catheterization;  Surgeon: Scout Robins MD;  Location:  HEART CARDIAC CATH LAB    CV RIGHT HEART CATH MEASUREMENTS RECORDED N/A 5/23/2024    Procedure: Right Heart Cath- pre noon with rush path;  Surgeon: Precious Bautista MD;  Location:  HEART CARDIAC CATH LAB    GI SURGERY  2003    [facility]    INSERT VENTRICULAR ASSIST DEVICE LEFT (HEARTMATE II) N/A 06/19/2017     Procedure: INSERT VENTRICULAR ASSIST DEVICE LEFT (HEARTMATE II);  Median Sternotomy Heartmate II Left Ventricular Assist Device Insertion on Pump Oxygenator;  Surgeon: Ronnie Quigley MD;  [facility]    IR CHEST TUBE PLACEMENT NON-TUNNELED RIGHT  7/11/2021    LAPAROSCOPY DIAGNOSTIC (GENERAL) N/A 1/4/2024    Procedure: Diagnostic Laparoscopy, Exploratory Laparotomy with Extensive lysis of adhesions.;  Surgeon: Frederick Montgomery MD;  [facility]    ORTHOPEDIC SURGERY  1994    right knee wired    PICC DOUBLE LUMEN PLACEMENT Right 09/23/2020    5FR PICC DL. Length-43cm (1cm out).    PICC INSERTION - DOUBLE LUMEN Right 05/09/2021    IK/BRACH    RELEASE CARPAL TUNNEL BILATERAL Bilateral 02/18/2021    Procedure: Bilateral carpal tunnel release;  Surgeon: Jermaine Brand MD;  [facility]    TRANSPLANT HEART RECIPIENT N/A 05/05/2021    Procedure: Redo median sternotomy, lysis of adhesions, heart transplant recipient, on cardiopulmonary bypass, intraoperative transesophageal echocardiogram per anesthesia, Implantable Cardioverter Defibrillator (ICD) removal;  Surgeon: Ronnie Quigley MD;  [facility]        MEDICATIONS:  PTA Meds  Prior to Admission medications    Medication Sig Last Dose Taking? Auth Provider Long Term End Date   acetaminophen (TYLENOL) 325 MG tablet Take 3 tablets (975 mg) by mouth every 8 hours as needed for mild pain Past Month Yes Frederick Montgomery MD No    albuterol (PROAIR HFA/PROVENTIL HFA/VENTOLIN HFA) 108 (90 Base) MCG/ACT inhaler INSTILL 2 INHALATIONS BY MOUTH  EVERY 4 HOURS AS NEEDED FOR  SHORTNESS OF BREATH`, WHEEZING  OR COUGH More than a month Yes Ricardo Gómez MD Yes    allopurinol (ZYLOPRIM) 300 MG tablet TAKE 1 TABLET BY MOUTH DAILY 8/23/2024 at AM Yes Ricardo Gómez MD No    bumetanide (BUMEX) 1 MG tablet Take 3 tablets (3 mg) by mouth 2 times daily 8/23/2024 at AM Yes Delisa Montgomery MD Yes    buPROPion (WELLBUTRIN XL) 300 MG 24 hr tablet TAKE 1 TABLET BY  MOUTH IN THE  MORNING 8/23/2024 at AM Yes Ricardo Gómez MD Yes    calcitRIOL (ROCALTROL) 0.25 MCG capsule Take 1 capsule (0.25 mcg) by mouth daily 8/23/2024 at AM Yes Gisela English NP Yes 7/13/25   calcium carbonate (TUMS) 500 MG chewable tablet Take 2 tablets (1,000 mg) by mouth 3 times daily (with meals) 8/23/2024 at AM Yes Gisela English NP     cyanocobalamin (VITAMIN B-12) 1000 MCG tablet Take 1 tablet (1,000 mcg) by mouth daily 8/23/2024 at AM Yes Gisela English NP     diphenhydrAMINE (BENADRYL) 25 MG tablet Take 25 mg by mouth every 6 hours as needed for itching Past Week Yes Reported, Patient No    ferrous sulfate (FEROSUL) 325 (65 Fe) MG tablet Take 1 tablet (325 mg) by mouth 2 times daily (with meals) 8/23/2024 at AM Yes Gisela English NP No    gabapentin (NEURONTIN) 300 MG capsule Take 300 mg by mouth daily 8/23/2024 at AM Yes Reported, Patient No    ipratropium - albuterol 0.5 mg/2.5 mg/3 mL (DUONEB) 0.5-2.5 (3) MG/3ML neb solution Take 1 vial by nebulization 4 times daily as needed for shortness of breath, wheezing or cough More than a month Yes Reported, Patient No    Melatonin 10 MG CAPS Take 1 capsule by mouth nightly as needed 8/22/2024 at PM Yes Reported, Patient     montelukast (SINGULAIR) 10 MG tablet TAKE 1 TABLET BY MOUTH AT  BEDTIME 8/22/2024 at PM Yes Ricardo Gómez MD Yes    mycophenolate (GENERIC EQUIVALENT) 250 MG capsule Take 2 capsules (500 mg) by mouth 2 times daily 8/23/2024 at AM Yes Rajani Wilder, APRN CNP Yes    polyethylene glycol (MIRALAX) 17 GM/Dose powder Take 1 Capful by mouth daily as needed for constipation Past Month Yes Reported, Patient     rosuvastatin (CRESTOR) 10 MG tablet TAKE 1 TABLET BY MOUTH DAILY 8/23/2024 at AM Yes Ricardo Gómez MD Yes    tacrolimus (GENERIC EQUIVALENT) 0.5 MG capsule Take 1 capsule (0.5 mg) by mouth every morning With 3 mg capsules 8/23/2024 at AM Yes Delisa Montgomery MD     tacrolimus (GENERIC  EQUIVALENT) 1 MG capsule TAKE 3 CAPSULES BY MOUTh IN am and in pm. 8/23/2024 at AM Yes Delisa Montgomery MD     traMADol (ULTRAM) 50 MG tablet TAKE ONE TABLET BY MOUTH EVERY MORNING AND AFTERNOON AND 2 AT BEDTIME AS NEEDED FOR PAIN More than a month Yes Rajani Wilder APRN CNP     Vitamin D, Cholecalciferol, 25 MCG (1000 UT) CAPS Take 25 mcg by mouth daily 8/23/2024 at AM Yes Gisela English NP     warfarin ANTICOAGULANT (COUMADIN) 2.5 MG tablet Take 2.5 mg daily or as directed by anticoagulation clinic 8/22/2024 at PM Yes Cogan, Jacob, MD No       Current Meds  Current Facility-Administered Medications   Medication Dose Route Frequency Provider Last Rate Last Admin    allopurinol (ZYLOPRIM) tablet 100 mg  100 mg Oral Daily Xiomara Carrera MD   100 mg at 08/24/24 2148    buPROPion (WELLBUTRIN XL) 24 hr tablet 300 mg  300 mg Oral QAM Martínez Locke MD   300 mg at 08/24/24 0912    calcitRIOL (ROCALTROL) capsule 0.25 mcg  0.25 mcg Oral Daily Martínez Locke MD   0.25 mcg at 08/24/24 0916    calcium carbonate (TUMS) chewable tablet 1,000 mg  1,000 mg Oral TID w/meals Martínez Locke MD   1,000 mg at 08/24/24 1711    ferrous sulfate (FEROSUL) tablet 325 mg  325 mg Oral BID w/meals Martínez Locke MD   325 mg at 08/24/24 1711    montelukast (SINGULAIR) tablet 10 mg  10 mg Oral At Bedtime Martínez Locke MD   10 mg at 08/24/24 2153    mycophenolate (GENERIC EQUIVALENT) capsule 500 mg  500 mg Oral BID Martínez Locke MD   500 mg at 08/24/24 1959    rosuvastatin (CRESTOR) tablet 10 mg  10 mg Oral Daily Martínez Locke MD   10 mg at 08/24/24 0915    sodium chloride (PF) 0.9% PF flush 3 mL  3 mL Intracatheter Q8H Martínez Barth MD   3 mL at 08/24/24 1711    tacrolimus (GENERIC EQUIVALENT) capsule 3.5 mg  3.5 mg Oral BID Xiomara Carrera MD   3.5 mg at 08/24/24 1958    Vitamin D3 (CHOLECALCIFEROL) tablet 25 mcg  25 mcg Oral Daily Martínez Locke,  MD   25 mcg at 24 0916    Warfarin Dose Required Daily - Pharmacist Managed  1 each Oral See Admin Instructions Chely Wynn MD         Infusion Meds  Current Facility-Administered Medications   Medication Dose Route Frequency Provider Last Rate Last Admin    bumetanide (BUMEX) 0.25 mg/mL infusion  1 mg/hr Intravenous Continuous Blanco Serrano MD 4 mL/hr at 24 1 mg/hr at 24    medication instruction   Does not apply Continuous PRN Martínez Barth MD        Patient is already receiving anticoagulation with heparin, enoxaparin (LOVENOX), warfarin (COUMADIN)  or other anticoagulant medication   Does not apply Continuous PRN Martínez Locke MD           ALLERGIES:    Allergies   Allergen Reactions    Grass Shortness Of Breath    Ace Inhibitors Cough    Cats     Dust Mites Other (See Comments)     Asthma    Mold Other (See Comments)     Asthma    Penicillins Other (See Comments)     Unknown - childhood exposure    Tolerated Zosyn -2020    Per patient report he has tolerated amoxicillin    Sulfa Antibiotics Other (See Comments) and Unknown     Unknown childhood reaction       REVIEW OF SYSTEMS:  A comprehensive of systems was negative except as noted above.    SOCIAL HISTORY:   Social History     Socioeconomic History    Marital status:      Spouse name: Not on file    Number of children: Not on file    Years of education: Not on file    Highest education level: Not on file   Occupational History    Not on file   Tobacco Use    Smoking status: Former     Current packs/day: 0.00     Types: Cigarettes     Quit date:      Years since quittin.6     Passive exposure: Never    Smokeless tobacco: Never   Vaping Use    Vaping status: Never Used   Substance and Sexual Activity    Alcohol use: Yes     Comment: on occasion    Drug use: No     Comment: tried gummy for anxiety    Sexual activity: Yes     Partners: Female   Other Topics Concern     Parent/sibling w/ CABG, MI or angioplasty before 65F 55M? No   Social History Narrative    Not on file     Social Determinants of Health     Financial Resource Strain: High Risk (2024)    Financial Resource Strain     Within the past 12 months, have you or your family members you live with been unable to get utilities (heat, electricity) when it was really needed?: Yes   Food Insecurity: Low Risk  (2024)    Food Insecurity     Within the past 12 months, did you worry that your food would run out before you got money to buy more?: No     Within the past 12 months, did the food you bought just not last and you didn t have money to get more?: No   Transportation Needs: Low Risk  (2024)    Transportation Needs     Within the past 12 months, has lack of transportation kept you from medical appointments, getting your medicines, non-medical meetings or appointments, work, or from getting things that you need?: No   Physical Activity: Not on file   Stress: Not on file   Social Connections: Not on file   Interpersonal Safety: Low Risk  (2024)    Interpersonal Safety     Do you feel physically and emotionally safe where you currently live?: Yes     Within the past 12 months, have you been hit, slapped, kicked or otherwise physically hurt by someone?: No     Within the past 12 months, have you been humiliated or emotionally abused in other ways by your partner or ex-partner?: No   Housing Stability: Low Risk  (2024)    Housing Stability     Do you have housing? : Yes     Are you worried about losing your housing?: No     Reviewed with patient       FAMILY MEDICAL HISTORY:   Family History   Problem Relation Age of Onset    Cerebrovascular Disease Mother 64    Diabetes Mother     Hypertension Mother     Coronary Artery Disease Father     Diabetes Type 2  Father     Obesity Brother     Obesity Brother     Cerebrovascular Disease Daughter 40     Reviewed with patient     PHYSICAL EXAM:   Temp  Av  F  "(36.7  C)  Min: 97.7  F (36.5  C)  Max: 98.4  F (36.9  C)      Pulse  Av.4  Min: 118  Max: 131 Resp  Av.6  Min: 17  Max: 24  SpO2  Av.1 %  Min: 97 %  Max: 100 %       /83 (BP Location: Right arm, Cuff Size: Adult Regular)   Pulse (!) 126   Temp 97.9  F (36.6  C) (Tympanic)   Resp 18   Ht 1.727 m (5' 8\")   Wt 86.6 kg (191 lb)   SpO2 100%   BMI 29.04 kg/m        Admit Weight: 88.6 kg (195 lb 4.8 oz)     GENERAL APPEARANCE: not distress,  awake  EYES: no scleral icterus, pupils equal  Pulmonary: lungs clear to auscultation with equal breath sounds bilaterally,.  CV: regular rhythm, normal rate, no rub   - JVD -ve   - Edema -ve  GI: soft, nontender, normal bowel sounds  MS: no evidence of inflammation in joints, no muscle tenderness  : no christopher  SKIN: no rash, warm, dry, no cyanosis  NEURO: face symmetric, no asterixis     LABS:   CMP  Recent Labs   Lab 24  0516 24  2324 24  1258    136 138   POTASSIUM 4.9 5.0 4.8   CHLORIDE 100 99 102   CO2 21* 22 22   ANIONGAP 16* 15 14   GLC 94 199* 122*   .0* 99.7* 91.2*   CR 4.54* 4.68* 3.95*   GFRESTIMATED 13* 13* 16*   QAMAR 8.8 8.7* 8.7*   MAG 2.3 2.3  --    PHOS 6.4* 6.5* 5.3*   PROTTOTAL 7.0 7.1  --    ALBUMIN 4.3 4.4 4.1   BILITOTAL 0.7 0.6  --    ALKPHOS 97 105  --    AST 41  --   --    ALT 25 25  --      CBC  Recent Labs   Lab 24  0516 24  2324 24  1258   HGB 10.2* 10.6* 10.5*   WBC 5.9 6.6  --    RBC 3.48* 3.60*  --    HCT 33.3* 34.7*  --    MCV 96 96  --    MCH 29.3 29.4  --    MCHC 30.6* 30.5*  --    RDW 14.7 14.7  --     178  --      INR  Recent Labs   Lab 24  0920 24  0516 24  2324 24  1258   INR 1.94* 1.95* 1.90* 1.97*     ABGNo lab results found in last 7 days.   URINE STUDIES  Recent Labs   Lab Test 24  1546 24  1306 24  1351 24  1007   COLOR Light Yellow Light Yellow Straw Yellow   APPEARANCE Clear Clear Clear Clear   URINEGLC " Negative Negative Negative Negative   URINEBILI Negative Negative Negative Negative   URINEKETONE Negative Trace* Negative Negative   SG 1.011 1.015 1.007 1.015   UBLD Negative Negative Negative Trace*   URINEPH 5.0 5.5 6.5 5.5   PROTEIN Negative Negative Negative Trace*   UROBILINOGEN  --   --   --  0.2   NITRITE Negative Negative Negative Negative   LEUKEST Negative Negative Negative Trace*   RBCU <1 <1 0 0-2   WBCU 1 <1 <1 5-10*     No lab results found.  PTH  Recent Labs   Lab Test 07/12/24  1357 03/28/23  1129   PTHI 649* 529*     IRON STUDIES  Recent Labs   Lab Test 08/19/24  1258 05/23/24  0511 08/05/21  0931 05/13/21  0534 02/09/21  0518 01/21/21  1350 09/10/20  0830 09/19/19  1129 07/31/19  1050 07/03/19  0828 11/20/18  0934 10/19/18  1007 07/06/18  0856 05/24/17  0652   IRON 40* 16* 30* 38 28* 36 29* 139 40 51 48 43 34* 63    306 240 215* 285 381 294 348 367 367 353 341 430 248   IRONSAT 16 5* 13* 18 10* 10* 10* 40 11* 14* 14* 13* 8* 25   VITA 151 24* 98 98 33  --   --   --  41 46 71  --  45 200       IMAGING:  All imaging studies reviewed by me.     Velma Miller MD

## 2024-08-25 NOTE — PROGRESS NOTES
Admitted to  at 2315  Admitted from: ED  Via: Becca  Belongings: Patients cell phone, , and clothes remain at bedside.  Admission paperwork: Complete   Teaching: Call don't fall, use of console, meal times, visiting hours, orientation to unit, when to call for the RN (angina/sob/dizzyness, etc.), and the importance of safety.   Access: LAC PIV (20 g) infusing Bumex gtt.   Telemetry: Placed on pt.  Ht./Wt.: Complete   Two nurse head-to-toe skin assessment performed by REGINALD Poe and REGINALD Andres.

## 2024-08-25 NOTE — PROGRESS NOTES
Nephrology Progress Note  08/25/2024           ASSESSMENT AND RECOMMENDATIONS:     Jim Willingham is a 69 year old male with PMH including CHF, CKD, prior gastric bypass, COPD, depression, NICM (s/p HM2 LVAD 6/2017) s/p OHT 5/6/21. Nephrology is consulted for CKD management.        # CKD stage V   CKD diagnosed long ago presumed from DM,HTN and cardiorenal from heart failure. Later creatinine increased following OHT and long term CNI use. Rapid progression of kidney dysfunction in the past six months from creatinine 1.58 and GFR 48 to creatinine to 4 to 4.5 and GFR < 15.Serologic work including c3, ,c4, dsDNA, ANCA, monoclonal screening were negative. 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 Renal imaging without obstruction. The reason for rapid progression to ESRD unclear. Suspect hemodynamic changes in the setting of CNI use.  He has attended the Kidney Smart program and is interested in PD. Current creatinine pretty much at baseline however anticipate the need  for RRT soon.  # BP/Volume  BP well controlled. Weight increased by 10 lbs last one week. CXR is equivocal for pulmonary congestion. Echo on 8/24/24 reported normal, EF 55-60% and normal RV function.   #  Anemia of renal disease: HB at goal. Low iron studies. Did not tolerate IV iron. On PTA oral iron   # BMD : Hyperphosphatemia and secondary hyperparathyroidism    On PTA Calcitriol 0.25 mcg every day and TUMS 1gm tid with meal.  # COPD, Suspect for superimposed pneumonia  #S/p heart transplant ; on tacrolimus and MMF      Recommendation   - Rapid progression for unclear reason, kidney biopsy deferred because high risk of bleeding (on warfarin ) and high chance getting scar tissue which won't change the management.  - Suggest sputum culture and chest CT given persistent productive cough of purulent sputum  - Suggest to have  dialysis access surgery evaluation and  "placement soon as outpatient ( In basket message sent to Gisela English NP )   - Continue PTA oral iron,no indication for ALOK therapy now  - Continue calcitriol 0.25 mcg po daily and TUMS 1 gm tds with meal  - Monitor I/O and daily weight         ecommendations were communicated to primary team via Note     Seen and discussed with Dr. Calvin Miller MD   Division of Renal Disease and Hypertension  [facility]  [email or web address]  Vocera Web Console      Interval History :     Nursing and provider notes from last 24 hours reviewed.  Continued to have shortness of breathing on exertion despite diuresis  Persistent cough productive of yellow to greenish sputum. No fever and chest pain   I = 0.8 L, O= 3.2 L     Physical Exam:   I/O last 3 completed shifts:  In: 568.26 [P.O.:480; I.V.:88.26]  Out: 2155 [Urine:2155]   BP 92/71 (BP Location: Right arm)   Pulse (!) 126   Temp 98.3  F (36.8  C) (Oral)   Resp 21   Ht 1.727 m (5' 8\")   Wt 85.9 kg (189 lb 4.8 oz)   SpO2 99%   BMI 28.78 kg/m       GENERAL APPEARANCE: not distress,  awake  EYES: no scleral icterus, pupils equal  Pulmonary: Faint diffuse expiratory wheezes bilaterally,.  CV: regular rhythm, normal rate, no rub   - JVD -ve   - Edema -ve  GI: soft, nontender, normal bowel sounds  MS: no evidence of inflammation in joints, no muscle tenderness  : no christopher  SKIN: no rash, warm, dry, no cyanosis  NEURO: face symmetric, no asterixis     Labs:   All labs reviewed by me  Electrolytes/Renal -   Recent Labs   Lab Test 08/25/24  0658 08/25/24  0502 08/24/24  0516    135 137   POTASSIUM 4.4 4.5 4.9   CHLORIDE 98 97* 100   CO2 22 22 21*   .0* 108.0* 102.0*   CR 4.62* 4.65* 4.54*   GLC 99 98 94   QAMAR 8.5* 8.7* 8.8   MAG 2.2 2.2 2.3   PHOS 6.1* 6.2* 6.4*       CBC -   Recent Labs   Lab Test 08/25/24  0502 08/24/24  0516 08/23/24  2324   WBC 5.9 5.9 6.6   HGB 10.4* 10.2* 10.6*    176 178       LFTs -   Recent Labs   Lab Test 08/25/24  0658 " 08/25/24  0502 08/24/24  0516   ALKPHOS 85 95 97   BILITOTAL 0.7 0.6 0.7   ALT 17 16 25   AST 21 21 41   PROTTOTAL 6.4 6.5 7.0   ALBUMIN 3.9 4.0 4.3       Iron Panel -   Recent Labs   Lab Test 08/19/24  1258 05/23/24  0511 08/05/21  0931   IRON 40* 16* 30*   IRONSAT 16 5* 13*   VITA 151 24* 98         Imaging:  All imaging studies reviewed by me.     Current Medications:  Current Facility-Administered Medications   Medication Dose Route Frequency Provider Last Rate Last Admin    allopurinol (ZYLOPRIM) tablet 100 mg  100 mg Oral Daily Xiomara Carrera MD   100 mg at 08/25/24 0836    buPROPion (WELLBUTRIN XL) 24 hr tablet 300 mg  300 mg Oral QAM Martínez Locke MD   300 mg at 08/25/24 0837    calcitRIOL (ROCALTROL) capsule 0.25 mcg  0.25 mcg Oral Daily Martínez Locke MD   0.25 mcg at 08/25/24 0836    calcium carbonate (TUMS) chewable tablet 1,000 mg  1,000 mg Oral TID w/meals Martínez Locke MD   1,000 mg at 08/25/24 1243    ferrous sulfate (FEROSUL) tablet 325 mg  325 mg Oral BID w/meals Martínez Locke MD   325 mg at 08/25/24 0837    montelukast (SINGULAIR) tablet 10 mg  10 mg Oral At Bedtime Martínez Locke MD   10 mg at 08/24/24 2153    mycophenolate (GENERIC EQUIVALENT) capsule 500 mg  500 mg Oral BID Martínez Locke MD   500 mg at 08/25/24 0838    rosuvastatin (CRESTOR) tablet 10 mg  10 mg Oral Daily Martínez Locke MD   10 mg at 08/25/24 0837    sodium chloride (PF) 0.9% PF flush 3 mL  3 mL Intracatheter Q8H Martínez Barth MD   3 mL at 08/24/24 1711    tacrolimus (GENERIC EQUIVALENT) capsule 3.5 mg  3.5 mg Oral BID Xiomara Carrera MD   3.5 mg at 08/25/24 0837    Vitamin D3 (CHOLECALCIFEROL) tablet 25 mcg  25 mcg Oral Daily Martínez Locke MD   25 mcg at 08/25/24 0837    warfarin ANTICOAGULANT (COUMADIN) tablet 4 mg  4 mg Oral ONCE at 18:00 Xiomara Carrera MD        Warfarin Dose Required Daily - Pharmacist Managed  1 each Oral See Admin  Instructions Chely Wynn MD         Current Facility-Administered Medications   Medication Dose Route Frequency Provider Last Rate Last Admin    bumetanide (BUMEX) 0.25 mg/mL infusion  1 mg/hr Intravenous Continuous Blanco Serrano MD 4 mL/hr at 08/25/24 1144 1 mg/hr at 08/25/24 1144    medication instruction   Does not apply Continuous PRN Martínez Barth MD        Patient is already receiving anticoagulation with heparin, enoxaparin (LOVENOX), warfarin (COUMADIN)  or other anticoagulant medication   Does not apply Continuous PRN Martínez Locke MD Momina Ahmed, MD

## 2024-08-25 NOTE — PROGRESS NOTES
[facility]  CARDIOLOGY HEART FAILURE SERVICE (CARDS II) PROGRESS NOTE    Patient Name: Jim Willingham    Medical Record Number: [id number]    YOB: 1955 69 year old   PCP: Ricardo Gómez    Admit Date/Time: 8/23/2024 10:45 PM   2    Assessment and Plan:  Jim Willingham is a 69-year-old male with PMH including CHF, CKD, prior gastric bypass, anemia, COPD, depression, NICM (s/p HM2 LVAD 6/2017), and s/p OHT 5/6/21. He was admitted for acute heart failure exacerbation and WALTER on CKD.    Today's update:  - continue Bumex 1 mg gtt  - RHC once euvolemic, probably on Tuesday.  - Tacrolimus level today is 5.1, goal (4-6)    #Acute on CHF Exacerbation  #s/p OHT 5/6/21  There is no history of rejection. Troponin is flat, decreasing from 156 to 152. NT Pro BNP was elevated at 28,847. The EKG did not show any acute ST or T wave changes. The echo today is unchanged from the previous one. His weight today is 195 lbs, with EDW of 185 lbs.    Volume: Hypervolemic. Bumex gtt 1 mg/hr.  Continue immunosuppressant regimen: Tacrolimus (goal 4-6),  MG BID   PPx:  CAV: Aspirin 81mg daily and rosuvastatin 10mg daily.  GI: Pantoprazole 40mg daily  Osteoporosis: Calcium/vitamin D supplements    #WALTER on CKD IV/V  He follows with nephrology as an outpatient and has attended a kidney smart program. The patient previously expressed interest in PD. Creatinine on admission was 4.68.    - No need to start dialysis for now per nephrology  - BMP daily  - Diuretics as above  - Continue PTA calcium and vitamin D supplementation  - outpatient PD surgery evaluation    #COPD, possible exacerbation  He is maintaining O2 sats on room air and had an isolated episode of a productive cough. The patient received DuoNeb at OSH with significant improvement in symptoms. He is afebrile. The respiratory panel is negative. Therefore, we will hold off on antibiotics and steroids for now.    - Continue DuoNebs 4  times daily as needed for wheezing/dyspnea  - Continue PTA albuterol inhaler every 4 hours as needed  - Continue PTA Singulair 10 mg nightly    #Hx of pulmonary embolism  Patient had V/Q scan on 5/20/2024 that showed PE.  This appeared to be unprovoked and therefore the patient was started on lifelong anticoagulation with warfarin.  He follows with anticoagulation clinic outpatient.  - Warfarin, Pharmacy dosed  - PT/INR daily  FEN: Cardiac diet, fluid restriction to 1.8L  PPX: Warfarin   Code Status: Full CODE  Pt was discussed and evaluated with Dr. Xiomara Carrera MD, attending physician, who agrees with the assessment and plan above.     Chely Wynn  Internal Medicine Resident (PGY1)    Review Of Systems  A 4-point ROS was negative aside from those listed above.    OBJECTIVE FINDINGS:  Temp:  [97.6  F (36.4  C)-98.1  F (36.7  C)] 98.1  F (36.7  C)  Pulse:  [123-128] 123  Resp:  [17-20] 18  BP: ()/(61-84) 92/74  SpO2:  [98 %-100 %] 100 %    Gen: AA&Ox3, no acute distress  HEENT:AT/ NC, PERRL b/l, EOM grossly intact, mucous membranes pink, moist without plaque or exudate  PULM/THORAX: Good air movement throughout, trace end expiratory wheezing at the bilateral lung bases.  CV: Tachycardic with regular rhythm S1 and S2 appreciated, no extra heart sounds, murmurs or rub auscultated. No JVD  ABD: Obese, soft, nontender,distended  EXT: Trace lower extremity edema  NEURO: no focal lesion  Intake/Output Summary (Last 24 hours) at 8/24/2024 1755  Last data filed at 8/24/2024 1300  Gross per 24 hour   Intake 486 ml   Output 2050 ml   Net -1564 ml     Wt Readings from Last 5 Encounters:   08/25/24 86.6 kg (191 lb)   08/23/24 89.4 kg (197 lb)   08/19/24 88.3 kg (194 lb 9.6 oz)   08/12/24 86.2 kg (190 lb)   07/12/24 92.1 kg (203 lb)     All others:  Current Facility-Administered Medications   Medication Dose Route Frequency Provider Last Rate Last Admin    acetaminophen (TYLENOL) tablet 650 mg  650 mg Oral Q4H PRN  Martínez Locke MD        Or    acetaminophen (TYLENOL) Suppository 650 mg  650 mg Rectal Q4H PRN Martínez Locke MD        albuterol (PROVENTIL HFA/VENTOLIN HFA) inhaler  1 puff Inhalation Q4H PRN Martínez Locke MD        allopurinol (ZYLOPRIM) tablet 100 mg  100 mg Oral Daily Xiomara Carrera MD   100 mg at 08/25/24 0836    bumetanide (BUMEX) 0.25 mg/mL infusion  1 mg/hr Intravenous Continuous Blanco Serrano MD 4 mL/hr at 08/25/24 1144 1 mg/hr at 08/25/24 1144    buPROPion (WELLBUTRIN XL) 24 hr tablet 300 mg  300 mg Oral QAM Martínez Locke MD   300 mg at 08/25/24 0837    calcitRIOL (ROCALTROL) capsule 0.25 mcg  0.25 mcg Oral Daily Martínez Locke MD   0.25 mcg at 08/25/24 0836    calcium carbonate (TUMS) chewable tablet 1,000 mg  1,000 mg Oral TID w/meals Martínez Locke MD   1,000 mg at 08/25/24 0837    ferrous sulfate (FEROSUL) tablet 325 mg  325 mg Oral BID w/meals Martínez Locke MD   325 mg at 08/25/24 0837    ipratropium - albuterol 0.5 mg/2.5 mg/3 mL (DUONEB) neb solution 3 mL  1 vial Nebulization 4x Daily PRN Martínez Locke MD   3 mL at 08/25/24 0428    lidocaine (LMX4) cream   Topical Q1H PRN Martínez Barth MD        lidocaine 1 % 0.1-1 mL  0.1-1 mL Other Q1H PRN Martínez Barth MD        medication instruction   Does not apply Continuous PRN Martínez Barth MD        melatonin tablet 10 mg  10 mg Oral At Bedtime PRN Martínez Locke MD   10 mg at 08/24/24 2155    montelukast (SINGULAIR) tablet 10 mg  10 mg Oral At Bedtime Martínez Locke MD   10 mg at 08/24/24 2153    mycophenolate (GENERIC EQUIVALENT) capsule 500 mg  500 mg Oral BID Martínez Locke MD   500 mg at 08/25/24 0838    Patient is already receiving anticoagulation with heparin, enoxaparin (LOVENOX), warfarin (COUMADIN)  or other anticoagulant medication   Does not apply Continuous PRN Martínez Locke MD        polyethylene glycol  (MIRALAX) Packet 17 g  17 g Oral BID PRN Martínez Locke MD        rosuvastatin (CRESTOR) tablet 10 mg  10 mg Oral Daily Martínez Locke MD   10 mg at 08/25/24 0837    senna-docusate (SENOKOT-S/PERICOLACE) 8.6-50 MG per tablet 1 tablet  1 tablet Oral BID PRN Martínez Locke MD        Or    senna-docusate (SENOKOT-S/PERICOLACE) 8.6-50 MG per tablet 2 tablet  2 tablet Oral BID PRN Martínez Locke MD        sodium chloride (PF) 0.9% PF flush 3 mL  3 mL Intracatheter Q8H Martínez Barth MD   3 mL at 08/24/24 1711    sodium chloride (PF) 0.9% PF flush 3 mL  3 mL Intracatheter q1 min prn Martínez Barth MD        tacrolimus (GENERIC EQUIVALENT) capsule 3.5 mg  3.5 mg Oral BID Xiomara Carrera MD   3.5 mg at 08/25/24 0837    Vitamin D3 (CHOLECALCIFEROL) tablet 25 mcg  25 mcg Oral Daily Martínez Locke MD   25 mcg at 08/25/24 0837    warfarin ANTICOAGULANT (COUMADIN) tablet 4 mg  4 mg Oral ONCE at 18:00 Xiomara Carrera MD        Warfarin Dose Required Daily - Pharmacist Managed  1 each Oral See Admin Instructions Chely Wynn MD            LABS Reviewed  IMAGES Reviewed

## 2024-08-25 NOTE — CONSULTS
Care Management Initial Consult    General Information  Assessment completed with: Patient,    Type of CM/SW Visit: Initial Assessment    Primary Care Provider verified and updated as needed: Yes   Readmission within the last 30 days: no previous admission in last 30 days         Advance Care Planning: Advance Care Planning Reviewed: present on chart          Communication Assessment  Patient's communication style: spoken language (English or Bilingual)    Hearing Difficulty or Deaf: no   Wear Glasses or Blind: other (see comments)    Cognitive  Cognitive/Neuro/Behavioral: WDL                      Living Environment:   People in home: spouse, grandchild(lidia)     Current living Arrangements:        Able to return to prior arrangements: yes       Family/Social Support:  Care provided by: self, spouse/significant other  Provides care for: child(lidia)  Marital Status:   Wife  Maulik       Description of Support System: Supportive         Current Resources:   Patient receiving home care services: No     Community Resources: None  Equipment currently used at home: cane, quad  Supplies currently used at home: Nebulizer tubing    Employment/Financial:  Employment Status: retired        Financial Concerns: none   Referral to Financial Worker: No       Does the patient's insurance plan have a 3 day qualifying hospital stay waiver?  Yes     Which insurance plan 3 day waiver is available? Alternative insurance waiver    Will the waiver be used for post-acute placement? Undetermined at this time    Lifestyle & Psychosocial Needs:  Social Determinants of Health     Food Insecurity: Low Risk  (8/24/2024)    Food Insecurity     Within the past 12 months, did you worry that your food would run out before you got money to buy more?: No     Within the past 12 months, did the food you bought just not last and you didn t have money to get more?: No   Depression: Not at risk (3/21/2024)    PHQ-2     PHQ-2 Score: 1   Housing  Stability: Low Risk  (8/24/2024)    Housing Stability     Do you have housing? : Yes     Are you worried about losing your housing?: No   Tobacco Use: Medium Risk (8/23/2024)    Received from [facility]     Patient History     Smoking Tobacco Use: Former     Smokeless Tobacco Use: Former     Passive Exposure: Not on file   Financial Resource Strain: High Risk (8/24/2024)    Financial Resource Strain     Within the past 12 months, have you or your family members you live with been unable to get utilities (heat, electricity) when it was really needed?: Yes   Alcohol Use: Not on file   Transportation Needs: Low Risk  (8/24/2024)    Transportation Needs     Within the past 12 months, has lack of transportation kept you from medical appointments, getting your medicines, non-medical meetings or appointments, work, or from getting things that you need?: No   Physical Activity: Not on file   Interpersonal Safety: Low Risk  (8/24/2024)    Interpersonal Safety     Do you feel physically and emotionally safe where you currently live?: Yes     Within the past 12 months, have you been hit, slapped, kicked or otherwise physically hurt by someone?: No     Within the past 12 months, have you been humiliated or emotionally abused in other ways by your partner or ex-partner?: No   Stress: Not on file   Social Connections: Not on file   Health Literacy: Not on file       Functional Status:  Prior to admission patient needed assistance:   Dependent ADLs:: Independent  Dependent IADLs:: Independent     Mental Health Status:  Mental Health Status: Current Concern  Mental Health Management: Other (see comment) (primary PCP is following patient for depression)    Chemical Dependency Status:  Chemical Dependency Status: No Current Concerns        Values/Beliefs:  Spiritual, Cultural Beliefs, [employer]ism Practices, Values that affect care: no               Additional Information:     Met with patient at bedside for initial consult  due to high risk score     Introduced self and the role of the care team. Patient confirmed living at the house address on file with wife and grand children. Patient states he is independent with his cares and ambulates without assistive device but has a cane at home that he does not use. Patient state she drives but wife will provide ride at discharge. Patient said he is retired. Patient confirmed her current PCP  and insurance on file.     Plan: TBD    CC will continue to follow.  Sofi Pinzon RN, PHN, BSN   Float Nurse Care Coordinator  Covering for Unit 6B/C  Phone [phone]

## 2024-08-25 NOTE — PLAN OF CARE
Goal Outcome Evaluation:      Plan of Care Reviewed With: patient    Overall Patient Progress: improvingOverall Patient Progress: improving    Outcome Evaluation: Plan: ANASTACIA Pinzon RN, PHN, BSN   Float Nurse Care Coordinator  Covering for Unit 6B/C  Phone [phone]

## 2024-08-25 NOTE — PLAN OF CARE
"Hours of Care [id number] - 0733    68 yo S/P heart transplant (5/2021) who presented to the ED with C/O SOB and cough concerning for HF exacerbation, possible COPD exacerbation, and WALTER on CKD. PMH includes HF S/P HM2 LVAD (2017) secondary to NICM now S/P heart transplant (5/6/2021), COPD, CKD, H/O PE (5/20/24) on warfarin, prior gastric bypass, anemia, depression.     General: AOx4. Pleasant, cooperative.  Able to verbalize needs and use call light appropriately.   Cardiac:  Denies chest pain, dizziness.  Sinus tach with HR in 120s.  Afebrile.  VSS.   Vascular: BLE +2/+3 edema  Respiratory: Denies SOB.  Reports some RESENDIZ.  Unlabored.  Productive cough with \"yellow\" sputum.  Fine crackles and slight expiratory wheeze in bases.  SpO2 > 93% on RA.  GI:  Denies N/V. 2.4 g NA diet.  1.8 L fluid restriction.  Last BM 8/24/2024.  : Voids spontaneously without difficulty.  Uses urinal so that nursing can record I&O.  Urine output adequate.   Skin: No deficits.   Lines: LAC PIV with Bumex 1 mg/hr gtt infusing.      Shift Events:  Received PRN RT treatment with Duoneb x 1 with reported improvement in ease of breathing.   "

## 2024-08-26 NOTE — PLAN OF CARE
Shift: [id number]    Neuro: A&Ox4.   Cardiac: Sinus tach with HR ranging from 110-120's (primary team aware). Denies chest pain and dizziness. Afebrile, VSS.   Respiratory: RA, denies SOB. Expiratory wheezes noted on auscultation. Productive cough.   GI/: Voiding spontaneously in bedside urinal. No BM this shift, LBM 8/25/24.   Diet/appetite: 2.4 g NA diet and 1.8 L fluid restriction. Denies N/V.  Activity: Up with SBA assist.  Pain: Denies.  Skin: No new deficits noted.  Lines: R PIV SL.   Replacements: No RN managed protocols.   Changes: Pt. Now on oral Bumex, Bumex gtt was discontinued at 1220.     Plan: NPO at midnight for possible Right Heart Cath tmr morning. Team still discussing plan. Continue to monitor patient and notify CARDS 2 of any changes.

## 2024-08-26 NOTE — PROGRESS NOTES
[facility]  CARDIOLOGY HEART FAILURE SERVICE (CARDS II) PROGRESS NOTE    Patient Name: Jim Willingham    Medical Record Number: [id number]    YOB: 1955 69 year old   PCP: Ricardo Gómez    Admit Date/Time: 8/23/2024 10:45 PM   3    Assessment and Plan:  Jim Willingham is a 69-year-old male with PMH including CHF, CKD, prior gastric bypass, anemia, COPD, depression, NICM (s/p HM2 LVAD 6/2017), and s/p OHT 5/6/21. He was admitted for acute heart failure exacerbation and WALTER on CKD.    Today's update:  - Discontinue Bumex 1 mg gtt and restart PTA PO Bumex 3 mg BID  - Transplant ID consulted for possible aspiration pneumonia  - Speech consulted for repeated aspiration pneumonia    #Acute on CHF Exacerbation  #s/p OHT 5/6/21  There is no history of rejection. Troponin is flat, decreasing from 156 to 152. NT Pro BNP was elevated at 28,847. The EKG did not show any acute ST or T wave changes. The echo today is unchanged from the previous one. His weight today is 195 lbs, with EDW of 185 lbs.    Volume: Hypervolemic. PO Bumex 3 mg BID  Continue immunosuppressant regimen: Tacrolimus (goal 4-6),  MG BID   PPx:  CAV: Aspirin 81mg daily and rosuvastatin 10mg daily.  GI: Pantoprazole 40mg daily  Osteoporosis: Calcium/vitamin D supplements    #WALTER on CKD IV/V  He follows with nephrology as an outpatient and has attended a kidney smart program. The patient previously expressed interest in PD. Creatinine on admission was 4.68.    - No need to start dialysis for now per nephrology  - BMP daily  - Diuretics as above  - Continue PTA calcium and vitamin D supplementation  - outpatient PD surgery evaluation    #COPD, possible exacerbation  He is maintaining O2 sats on room air and had an isolated episode of a productive cough. The patient received DuoNeb at OSH with significant improvement in symptoms. He is afebrile. The respiratory panel is negative. Therefore, we will hold  off on antibiotics and steroids for now.    - Continue DuoNebs 4 times daily as needed for wheezing/dyspnea  - Continue PTA albuterol inhaler every 4 hours as needed  - Continue PTA Singulair 10 mg nightly    #Hx of pulmonary embolism  Patient had V/Q scan on 5/20/2024 that showed PE.  This appeared to be unprovoked and therefore the patient was started on lifelong anticoagulation with warfarin.  He follows with anticoagulation clinic outpatient.  - Warfarin, Pharmacy dosed  - PT/INR daily  FEN: Cardiac diet, fluid restriction to 1.8L  PPX: Warfarin   Code Status: Full CODE  Pt was discussed and evaluated with Dr. Xiomara Carrera MD, attending physician, who agrees with the assessment and plan above.     Chely Wynn  Internal Medicine Resident (PGY1)    Review Of Systems  A 4-point ROS was negative aside from those listed above.    OBJECTIVE FINDINGS:  Temp:  [97.1  F (36.2  C)-98.3  F (36.8  C)] 97.8  F (36.6  C)  Pulse:  [111-126] 119  Resp:  [16-21] 16  BP: ()/(66-83) 91/79  SpO2:  [98 %-100 %] 99 %    Gen: AA&Ox3, no acute distress  HEENT:AT/ NC, PERRL b/l, EOM grossly intact, mucous membranes pink, moist without plaque or exudate  PULM/THORAX: Good air movement throughout, trace end expiratory wheezing at the bilateral lung bases.  CV: Tachycardic with regular rhythm S1 and S2 appreciated, no extra heart sounds, murmurs or rub auscultated. No JVD  ABD: Obese, soft, nontender,distended  EXT: Trace lower extremity edema  NEURO: no focal lesion  Intake/Output Summary (Last 24 hours) at 8/24/2024 1755  Last data filed at 8/24/2024 1300  Gross per 24 hour   Intake 486 ml   Output 2050 ml   Net -1564 ml     Wt Readings from Last 5 Encounters:   08/26/24 84.9 kg (187 lb 3.2 oz)   08/23/24 89.4 kg (197 lb)   08/19/24 88.3 kg (194 lb 9.6 oz)   08/12/24 86.2 kg (190 lb)   07/12/24 92.1 kg (203 lb)     All others:  Current Facility-Administered Medications   Medication Dose Route Frequency Provider Last Rate Last  Admin    acetaminophen (TYLENOL) tablet 650 mg  650 mg Oral Q4H PRN Martínez Locke MD   650 mg at 08/25/24 2352    Or    acetaminophen (TYLENOL) Suppository 650 mg  650 mg Rectal Q4H PRN Martínez Locke MD        albuterol (PROVENTIL HFA/VENTOLIN HFA) inhaler  1 puff Inhalation Q4H PRN Martínez Locke MD        allopurinol (ZYLOPRIM) tablet 100 mg  100 mg Oral Daily Xiomara Carrera MD   100 mg at 08/26/24 0745    bumetanide (BUMEX) tablet 3 mg  3 mg Oral BID Lorenza Chavez MD   3 mg at 08/26/24 1230    buPROPion (WELLBUTRIN XL) 24 hr tablet 300 mg  300 mg Oral QAM Martínez Locke MD   300 mg at 08/26/24 0744    calcitRIOL (ROCALTROL) capsule 0.25 mcg  0.25 mcg Oral Daily Martínez Locke MD   0.25 mcg at 08/26/24 0744    calcium carbonate (TUMS) chewable tablet 1,000 mg  1,000 mg Oral TID w/meals Martínez Locke MD   1,000 mg at 08/26/24 1230    ferrous sulfate (FEROSUL) tablet 325 mg  325 mg Oral BID w/meals Martínez Locke MD   325 mg at 08/26/24 0745    ipratropium - albuterol 0.5 mg/2.5 mg/3 mL (DUONEB) neb solution 3 mL  1 vial Nebulization 4x Daily PRN Martínez Locke MD   3 mL at 08/25/24 2256    lidocaine (LMX4) cream   Topical Q1H PRN Martínez Barth MD        lidocaine 1 % 0.1-1 mL  0.1-1 mL Other Q1H PRN Martínez Barth MD        medication instruction   Does not apply Continuous PRN Martínez Barth MD        melatonin tablet 10 mg  10 mg Oral At Bedtime PRN Martínez Locke MD   10 mg at 08/25/24 2251    montelukast (SINGULAIR) tablet 10 mg  10 mg Oral At Bedtime Martínez Locke MD   10 mg at 08/25/24 2004    mycophenolate (GENERIC EQUIVALENT) capsule 500 mg  500 mg Oral BID Martínez Locke MD   500 mg at 08/26/24 0735    Patient is already receiving anticoagulation with heparin, enoxaparin (LOVENOX), warfarin (COUMADIN)  or other anticoagulant medication   Does not apply Continuous PRN Martínez Locke MD         polyethylene glycol (MIRALAX) Packet 17 g  17 g Oral BID PRN Martínez Locke MD        rosuvastatin (CRESTOR) tablet 10 mg  10 mg Oral Daily Martínez Locke MD   10 mg at 08/26/24 0744    senna-docusate (SENOKOT-S/PERICOLACE) 8.6-50 MG per tablet 1 tablet  1 tablet Oral BID PRN Martínez Locke MD        Or    senna-docusate (SENOKOT-S/PERICOLACE) 8.6-50 MG per tablet 2 tablet  2 tablet Oral BID PRN Martínez Locke MD        sodium chloride (PF) 0.9% PF flush 3 mL  3 mL Intracatheter Q8H Martínez Barth MD   3 mL at 08/26/24 1230    sodium chloride (PF) 0.9% PF flush 3 mL  3 mL Intracatheter q1 min prn Martínez Barth MD        tacrolimus (GENERIC EQUIVALENT) capsule 3.5 mg  3.5 mg Oral BID Xiomara Carrera MD   3.5 mg at 08/26/24 0744    Vitamin D3 (CHOLECALCIFEROL) tablet 25 mcg  25 mcg Oral Daily Martínez Locke MD   25 mcg at 08/26/24 0745    Warfarin Dose Required Daily - Pharmacist Managed  1 each Oral See Admin Instructions Chely Wynn MD            LABS Reviewed  IMAGES Reviewed

## 2024-08-26 NOTE — PLAN OF CARE
Goal Outcome Evaluation:      Plan of Care Reviewed With: patient    Overall Patient Progress: improving Overall Patient Progress: improving    Outcome Evaluation: Pending dialysis access placement

## 2024-08-26 NOTE — PLAN OF CARE
"Shift 1900 - 0700     68 yo S/P heart transplant (5/2021) who presented to the ED with C/O SOB and cough concerning for HF exacerbation vs. possible COPD exacerbation, and WALTER on CKD with creatinine of 4.63 (now 4.78). PMH includes HF S/P HM2 LVAD (2017) secondary to NICM now S/P heart transplant (5/6/2021), COPD, CKD, H/O PE (5/20/24) on warfarin, prior gastric bypass, anemia, depression.      General: AOx4. NAD. Pleasant, cooperative.  Able to verbalize needs and use call light appropriately.   Cardiac:  Denies chest pain, dizziness.  Sinus tach with HR in mid to high 120s (primary team aware).  Afebrile.  VSS.   Vascular: BLE edema now improved/ resolved.  Respiratory: Denies SOB.  Reports some RESENDIZ.  Unlabored.  Productive cough with \"green\" sputum - sputum culture was ordered and sent 8/25/24, Expiratory wheezes with crackles in bilateral bases.  SpO2 > 93% on RA.  GI:  Denies N/V. 2.4 g NA diet.  1.8 L fluid restriction.  Last BM 8/24/2024.  : Voids spontaneously without difficulty.  Uses urinal so that nursing can record I&O.  Urine output adequate.   Skin: No deficits.   Lines: RFA PIV with Bumex 1 mg/hr gtt infusing.       Shift Events:  2300 Received PRN Duoneb treatment, pt reports improvement in ease of breathing following treatment.     "

## 2024-08-26 NOTE — PROGRESS NOTES
Nephrology Progress Note  08/26/2024           ASSESSMENT AND RECOMMENDATIONS:     Jim Willingham is a 69 year old male with PMH including CHF, CKD, prior gastric bypass, COPD, depression, NICM (s/p HM2 LVAD 6/2017) s/p OHT 5/6/21. Nephrology is consulted for CKD management.     # CKD stage V   CKD diagnosed long ago presumed from DM,HTN and cardiorenal from heart failure. Later creatinine increased following OHT and long term CNI use. Rapid progression of kidney dysfunction in the past six months from creatinine 1.58 and GFR 48 to creatinine to 4 to 4.5 and GFR < 15.Serologic work including c3, ,c4, dsDNA, ANCA, monoclonal screening were negative. 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 Renal imaging without obstruction. The reason for rapid progression to ESRD unclear. Suspect hemodynamic changes in the setting of CNI use.  He has attended the Kidney Smart program and is interested in PD. Current creatinine pretty much at baseline however anticipate the need  for RRT soon likely in 2-3 months.   # BP/Volume  BP well controlled. Weight increased by 10 lbs last one week. CXR is equivocal for pulmonary congestion. Echo on 8/24/24 reported normal, EF 55-60% and normal RV function.   #  Anemia of renal disease: HB at goal. Low iron studies. Did not tolerate IV iron. On PTA oral iron   # BMD : Hyperphosphatemia and secondary hyperparathyroidism    On PTA Calcitriol 0.25 mcg every day and TUMS 1gm tid with meal.  # COPD, Suspect for superimposed pneumonia  #S/p heart transplant ; on tacrolimus and MMF      Recommendations   - Check Cystatin C today pending  - Please start PhosLo 667 mg TID with meal  - Agree with switching Bumex to oral 3 mg BID  - He has an appointment with Fiordaliza English on 9/16/24  - Rapid progression for unclear reason, kidney biopsy deferred because high risk of bleeding (on warfarin ) and high chance getting  "scar tissue which won't change the management.  - He met with surgeon before regarding PD candidacy and PD is not a guarantee now he is leaning towards HD  - Continue PTA oral iron, no indication for ALOK therapy now  - Continue calcitriol 0.25 mcg po daily and TUMS 1 gm tds with meal  - daily renal panel  - Monitor I/O and daily weight      Recommendations were communicated to primary team via Note.     Manuel Lynch MD   Division of Renal Disease and Hypertension  [facility]  [employer]    Interval History :   Nursing and provider notes from last 24 hours reviewed.    Yesterday he urinated 2.6 L and was net -850 mL.  Body weight in the morning is down to 84.9 kg.  Lab this morning showed sodium 134, potassium 4.4, bicarb 22.   and creatinine 4.97.  Phosphorus 6.6.  Not on oxygen supplement. On Bumex 1 mg/hr.     Speak with him today regarding his kidney function. He reported that he is still having dry cough.  But denies any fevers or chills.  No short of breath.  He has been making good urine output.  Body weight today is 187 pounds which is close to his dry weight at 185 pounds.    Physical Exam:   I/O last 3 completed shifts:  In: 1806.67 [P.O.:1716; I.V.:90.67]  Out: 2155 [Urine:2155]   /81 (BP Location: Right arm, Cuff Size: Adult Regular)   Pulse (!) 123   Temp 98.3  F (36.8  C) (Oral)   Resp 17   Ht 1.727 m (5' 8\")   Wt 84.9 kg (187 lb 3.2 oz)   SpO2 98%   BMI 28.46 kg/m       GENERAL APPEARANCE: not distress,  awake  EYES: no scleral icterus, pupils equal  Pulmonary: Faint diffuse expiratory wheezes bilaterally,.  CV: regular rhythm, normal rate, no rub   - JVD -ve   - Edema -ve  GI: soft, nontender, normal bowel sounds  MS: no evidence of inflammation in joints, no muscle tenderness  : no christopher  SKIN: no rash, warm, dry, no cyanosis  NEURO: face symmetric, no asterixis     Labs:   All labs reviewed by me  Electrolytes/Renal -   Recent Labs   Lab Test 08/26/24  0513 " 08/25/24 1918 08/25/24  0658   * 134* 135   POTASSIUM 4.4 4.4 4.4   CHLORIDE 96* 95* 98   CO2 22 24 22   .0* 110.0* 109.0*   CR 4.97* 4.78* 4.62*   * 103* 99   QAMAR 8.6* 8.8 8.5*   MAG 2.3 2.3 2.2   PHOS 6.6* 6.2* 6.1*       CBC -   Recent Labs   Lab Test 08/26/24  0513 08/25/24  0502 08/24/24  0516   WBC 5.9 5.9 5.9   HGB 9.9* 10.4* 10.2*    164 176       LFTs -   Recent Labs   Lab Test 08/26/24  0513 08/25/24 1918 08/25/24  0658   ALKPHOS 84 93 85   BILITOTAL 0.5 0.5 0.7   ALT 14 16 17   AST 16 18 21   PROTTOTAL 6.6 6.5 6.4   ALBUMIN 3.9 4.1 3.9       Iron Panel -   Recent Labs   Lab Test 08/19/24  1258 05/23/24  0511 08/05/21  0931   IRON 40* 16* 30*   IRONSAT 16 5* 13*   VITA 151 24* 98         Imaging:  All imaging studies reviewed by me.     Current Medications:  Current Facility-Administered Medications   Medication Dose Route Frequency Provider Last Rate Last Admin    allopurinol (ZYLOPRIM) tablet 100 mg  100 mg Oral Daily Xiomara Carrera MD   100 mg at 08/26/24 0745    buPROPion (WELLBUTRIN XL) 24 hr tablet 300 mg  300 mg Oral QAM Martínez Locke MD   300 mg at 08/26/24 0744    calcitRIOL (ROCALTROL) capsule 0.25 mcg  0.25 mcg Oral Daily Martínez Locke MD   0.25 mcg at 08/26/24 0744    calcium carbonate (TUMS) chewable tablet 1,000 mg  1,000 mg Oral TID w/meals Martínez Locke MD   1,000 mg at 08/26/24 0744    ferrous sulfate (FEROSUL) tablet 325 mg  325 mg Oral BID w/meals Martínez Locke MD   325 mg at 08/26/24 0745    montelukast (SINGULAIR) tablet 10 mg  10 mg Oral At Bedtime Martínez Locke MD   10 mg at 08/25/24 2004    mycophenolate (GENERIC EQUIVALENT) capsule 500 mg  500 mg Oral BID Martínez Locke MD   500 mg at 08/26/24 0744    rosuvastatin (CRESTOR) tablet 10 mg  10 mg Oral Daily Martínez Locke MD   10 mg at 08/26/24 0744    sodium chloride (PF) 0.9% PF flush 3 mL  3 mL Intracatheter Q8H Martínez Barth MD   3  mL at 08/24/24 1711    tacrolimus (GENERIC EQUIVALENT) capsule 3.5 mg  3.5 mg Oral BID Xiomara Carrera MD   3.5 mg at 08/26/24 0744    Vitamin D3 (CHOLECALCIFEROL) tablet 25 mcg  25 mcg Oral Daily Martínez Locke MD   25 mcg at 08/26/24 0745    Warfarin Dose Required Daily - Pharmacist Managed  1 each Oral See Admin Instructions Chely Wynn MD         Current Facility-Administered Medications   Medication Dose Route Frequency Provider Last Rate Last Admin    bumetanide (BUMEX) 0.25 mg/mL infusion  1 mg/hr Intravenous Continuous Blanco Serrano MD 4 mL/hr at 08/26/24 0440 1 mg/hr at 08/26/24 0440    medication instruction   Does not apply Continuous PRN Martínez Barth MD        Patient is already receiving anticoagulation with heparin, enoxaparin (LOVENOX), warfarin (COUMADIN)  or other anticoagulant medication   Does not apply Continuous PRN Martínez Locke MD Nattawat Klomjit, MD

## 2024-08-26 NOTE — CONSULTS
SOLID ORGAN TRANSPLANT INFECTIOUS DISEASES CONSULTATION     Patient:  Jim Willingham   YOB: 1955  Date of Visit:  08/26/2024  Date of Admission: 8/23/2024  Consult Requester:Xiomara Carrera MD  Reason for Consult: Concerns for pneumonia in HT patient on tacrolimus and MMF          Assessment and Recommendations:   Recommendations:  Recommend 7 days of treatment for superimposed bacterial pneumonia vs COPD exacerbation, options:  If QTc corrects to acceptable range: ciprofloxacin 750mg PO daily (renally adjusted)  Second option: Bactrim 1 DS q12hrs (renally adjusted from standard dose of 2DS BID)   - would need to monitor K+ and Cr closely  Third option: cefepime either 1g IV q12hrs or 2g IV q24hrs (renally adjusted)  - While awaiting susceptibilities- patient does not require IV therapy based on mild sx so would de-escalate to PO therapy when able   OK with steroids for possible COPD exacerbation     Thank you for the consult. Transplant ID will continue to follow with you.     This patient's care was discussed with Dr. Londono (SOT ID staff) and recs communicated to Dr. Wynn (PGY1- Cardiology)    Yadira Young PA-C  Pronouns: she/her/hers  Infectious Diseases  Contact via [employer] or [employer] Paging/Directory  08/26/2024    Assessment:  Jim Willingham is a 69 year old male who underwent OHT 5/6/21 for NICM (s/p HM2 LVAD 6/2017), CHF, CKD stage V, COPD, 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     Afebrile (reports temperature elevates at 99F at home), VSS, no hypoxia. Small amount of RLL infiltrate on imaging. Serratia marcescens on sputum culture. Based on history of several family members with URI symptoms with later development of productive cough, suspect that patient had a viral URI and has developed a superimposed bacterial pneumonia vs COPD  exacerbation. Antibiotics are appropriate treatment for either of these processes. Based on his clinical picture, PO abx are appropriate. Patient's QTc is prolonged (prior to correction for RBBB) will discuss with primary team (cardiology) to see if this corrects to an acceptable range to use a flouroquinolone, which would be preferred. Bactrim is the second choice, with the caveat that Cr and K+ would need to be monitored in setting of his advanced CKD. If neither of these will work, could start cefepime while awaiting susceptibilities with the eventual goal of using a PO 3rd generation cephalosporin if susceptible.         Previous ID Issues:  - SBO requiring surgical intervention (1/4/24) and large aspiration event per-op. Sputum cx 1/2024 with Hafnia alvei (R: Amp, Amp/Sulbactam, Pip/Tazo) treated with Cefepime--> levofloxacin  - Left upper lobe cavitary lesion, A.fumigatus on BAL (8/30/21). S/p voriconazole x2-3 months (finished 11/2021)  - Hx COVID pneumonia s/p monoclonal Ab and 5 days of remdesivir (8/2021)  - S.hominis (Methicillin susceptible) bacteremia (9/2020), presumed LVAD related and treated x6 weeks  - MRSE in hand joint, broth only (7/2021)- deemed contaminant with inflamed joint due to pseudogout  - RLL pneumonia with exudative pleural effusion (7/2021), effusion cultures were negative. Urine histo and blasto antigens negative, Aspergillus galactomannan negative, BD glucan intermediate. Treated with IV abx then levofloxacin. Improvement on CT chest 7/23/21.   - H.influenzae pneumonia (1/2020)  - Low-grade EBV viremia      Other ID issues:  - QTc interval:  568 msec on 8/23/24  - Bacterial prophylaxis:  none  - Pneumocystis prophylaxis:  none (s/p 6 months of ppx)  - Viral serostatus: CMV D+/R+, EBV D+/R+, HSV1+/2-, VZV +, Toxo D-/R-   - Viral prophylaxis:  none  - Fungal prophylaxis:  none  - Immunosuppression: mycophenolate, tac  - Immunization status:  Candidate for Shingrix and RSV vaccines  -  Gamma globulin status:  last checked 2020  - Isolation status: good hand hygiene           History of Present Illness:   Transplants:  5/6/2021 (Heart), Postoperative day:  1208     Jim Willingham is a 69 year old male who underwent OHT 5/6/21 for NICM (s/p HM2 LVAD 6/2017), CHF, CKD stage V, COPD, and prior gastric bypass who was admitted on 8/23/24 with a week of cough and URI symptoms as well as sputum production.     Wife and granddaughter have had cold symptoms for last few weeks. Initially went to [place] ED where COVID testing was negative, BNP and Cr both elevated and was transferred to [facility] for further management.     Patient and family members had URI symptoms, primarily dry cough, that started about 3 weeks ago. His wife improved after taking antibiotics, and his great granddaughter got better on her own. He noticed more sputum production about 2 weeks ago, which has increased since onset. Now producing large amounts of green sputum throughout the day, cough is productive most of the time. Some shortness of breath but has not had low sats. Associated bilateral frontotemporal headache and ear fullness L>R. Temperature has been high for him around 99F. No fevers, shaking chills, sore throat, congestion, dental pain, neck pain, GI symptoms, rashes. This feels like previous episodes of COPD exacerbations, which he states are normally treated with steroids and cefdinir.     Lives in [place], [place] with wife and 10 year old great granddaughter. 3 dogs at home, no other animal exposures. Retired respiratory therapist. Does some yard work - mostly mowing grass. No hiking, camping, hunting, fishing, or recent travel.          Past Medical History:     Past Medical History:   Diagnosis Date    Aspergillus pneumonia (H) 08/2021    A.fumigatus on BAL, treated with voriconazole x2-3 months    Aspiration pneumonia (H) 01/06/2024    Bariatric surgery status 2003    Benign essential hypertension 05/11/2017     Bilateral carpal tunnel syndrome 11/02/2020    BPH with obstruction/lower urinary tract symptoms 03/21/2024    Cardiomyopathy, unspecified (H) 05/08/2017    CKD (chronic kidney disease) stage 3, GFR 30-59 ml/min (H) 05/11/2017    COPD (chronic obstructive pulmonary disease) (H) 11/02/2020    Depression 05/11/2017    Diabetes mellitus (H) 1995    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    Generalized osteoarthritis 09/26/2023    Gouty arthropathy, chronic, without tophi 11/02/2020    H/O adenomatous polyp of colon 08/03/2016    2 polyps    H/O gastric bypass 05/11/2017    History of type 2 diabetes mellitus 03/28/2023    Hyperlipidemia LDL goal <70 09/27/2022    ICD (implantable cardioverter-defibrillator), biventricular, in situ 05/11/2017    IFG (impaired fasting glucose) 09/26/2023    LVAD (left ventricular assist device) present (H)     Major depression, recurrent, chronic (H24) 11/02/2020    Mild anemia 03/18/2022    NICM (nonischemic cardiomyopathy) (H)/ EF 20% 05/11/2017    ECHO: LVEDd. 7.66 cm, Restrictive pattern , Severe mitral valve regurgitation    CECILIA (obstructive sleep apnea) 05/11/2017    Paroxysmal atrial fibrillation (H) 05/11/2017    Paroxysmal VT (H) 05/11/2017    Peripheral polyneuropathy 03/28/2023    Postsurgical dumping syndrome 03/21/2024    Pulmonary cavitary lesion 11/18/2021    Rotator cuff tear arthropathy of both shoulders 11/02/2020    Type 2 diabetes mellitus with diabetic polyneuropathy (H) 03/18/2022    Uncomplicated asthma     Vitamin B12 deficiency (non anemic) 05/11/2017            Past Surgical History:     Past Surgical History:   Procedure Laterality Date    ANESTHESIA CARDIOVERSION N/A 05/11/2020    Procedure: ANESTHESIA, FOR CARDIOVERSION @1100;  Surgeon: GENERIC ANESTHESIA PROVIDER;  Location: [facility] OR    BRONCHOSCOPY (RIGID OR FLEXIBLE), DIAGNOSTIC N/A 8/30/2021    Procedure: BRONCHOSCOPY, WITH BRONCHOALVEOLAR LAVAGE;  Surgeon: Perlman, David Morris, MD;  Location: [facility] GI     COLONOSCOPY N/A 4/18/2024    Procedure: COLONOSCOPY, WITH POLYPECTOMY;  Surgeon: Jermaine Root MD;  Location:  GI    CV CORONARY ANGIOGRAM N/A 5/17/2022    Procedure: Coronary Angiogram;  Surgeon: Jeffrey Gibson MD;  Location:  HEART CARDIAC CATH LAB    CV CORONARY ANGIOGRAM N/A 5/23/2023    Procedure: Coronary Angiogram;  Surgeon: Scout Robins MD;  [facility]    CV HEART BIOPSY N/A 5/13/2021    Procedure: Heart Biopsy;  Surgeon: Scout Robins MD;  Location: [facility]    CV HEART BIOPSY N/A 5/20/2021    Procedure: Heart Biopsy;  Surgeon: Jeffrey Gibson MD;  Location:  HEART CARDIAC CATH LAB    CV HEART BIOPSY N/A 5/27/2021    Procedure: Heart Biopsy;  Surgeon: Jac Dover MD;  Location: [facility]    CV HEART BIOPSY N/A 6/7/2021    Procedure: CV HEART BIOPSY;  Surgeon: Jeffrey Gibson MD;  Location: [facility]    CV HEART BIOPSY N/A 6/21/2021    Procedure: CV HEART BIOPSY;  Surgeon: Scout Robins MD;  Location:  HEART CARDIAC CATH LAB    CV HEART BIOPSY N/A 7/5/2021    Procedure: CV HEART BIOPSY;  Surgeon: Jeffrey Gibson MD;  Location: [facility]    CV HEART BIOPSY N/A 7/16/2021    Procedure: Heart Biopsy;  Surgeon: Amadeo Art MD;  Location: [facility]    CV HEART BIOPSY N/A 8/5/2021    Procedure: Heart Biopsy;  Surgeon: Jeffrey Gibson MD;  Location:  HEART CARDIAC CATH LAB    CV HEART BIOPSY N/A 8/31/2021    Procedure: Heart Cath Heart Biopsy;  Surgeon: Jeffrey Gibson MD;  [facility]    CV HEART BIOPSY N/A 9/21/2021    Procedure: CV HEART BIOPSY;  Surgeon: Jeffrey Gibson MD;  Location:  HEART CARDIAC CATH LAB    CV HEART BIOPSY N/A 10/5/2021    Procedure: CV HEART BIOPSY;  Surgeon: Jeffrey Gibson MD;   Location: [facility]    CV HEART BIOPSY N/A 11/4/2021    Procedure: CV HEART BIOPSY;  Surgeon: Nicola Seth MD;  Location: [facility]    CV HEART BIOPSY N/A 5/17/2022    Procedure: Heart Biopsy;  Surgeon: Jeffrey Gibson MD;  [facility]    CV HEART BIOPSY N/A 5/23/2023    Procedure: Heart Biopsy;  Surgeon: Scout Robins MD;  [facility]: [facility]    CV HEART BIOPSY N/A 5/23/2024    Procedure: Heart Biopsy;  Surgeon: Precious Bautista MD;  Location:  HEART CARDIAC CATH LAB    CV RIGHT HEART CATH MEASUREMENTS RECORDED N/A 07/24/2019    Procedure: CV RIGHT HEART CATH;  Surgeon: Renu Sears MD;  Location:  HEART CARDIAC CATH LAB    CV RIGHT HEART CATH MEASUREMENTS RECORDED N/A 08/05/2020    Procedure: CV RIGHT HEART CATH;  Surgeon: Nicola Seth MD;  Location:  HEART CARDIAC CATH LAB    CV RIGHT HEART CATH MEASUREMENTS RECORDED N/A 01/07/2021    Procedure: CV RIGHT HEART CATH;  Surgeon: Jac Dover MD;  Location:  HEART CARDIAC CATH LAB    CV RIGHT HEART CATH MEASUREMENTS RECORDED N/A 02/23/2021    Procedure: Heart Cath Right Heart Cath;  Surgeon: Jeffrey Gibson MD;  Location:  HEART CARDIAC CATH LAB    CV RIGHT HEART CATH MEASUREMENTS RECORDED N/A 03/23/2021    Procedure: Heart Cath Right Heart Cath. request for 3/23;  Surgeon: Jeffrey Gibson MD;  Location:  HEART CARDIAC CATH LAB    CV RIGHT HEART CATH MEASUREMENTS RECORDED N/A 5/13/2021    Procedure: Right Heart Cath;  Surgeon: Scout Robins MD;  [facility]    CV RIGHT HEART CATH MEASUREMENTS RECORDED N/A 5/20/2021    Procedure: Right Heart Cath;  Surgeon: Jeffrey Gibson MD;  Location:  HEART CARDIAC CATH LAB    CV RIGHT HEART CATH MEASUREMENTS RECORDED N/A 5/27/2021    Procedure: Right Heart Cath;  Surgeon: Jac Dover MD;  Location:  HEART CARDIAC CATH  LAB    CV RIGHT HEART CATH MEASUREMENTS RECORDED N/A 6/7/2021    Procedure: CV RIGHT HEART CATH;  Surgeon: Jeffrey Gibson MD;  Location:  HEART CARDIAC CATH LAB    CV RIGHT HEART CATH MEASUREMENTS RECORDED N/A 6/21/2021    Procedure: CV RIGHT HEART CATH;  Surgeon: Scout Robins MD;  Location:  HEART CARDIAC CATH LAB    CV RIGHT HEART CATH MEASUREMENTS RECORDED N/A 7/5/2021    Procedure: CV RIGHT HEART CATH;  Surgeon: Jeffrey Gibson MD;  Location:  HEART CARDIAC CATH LAB    CV RIGHT HEART CATH MEASUREMENTS RECORDED N/A 7/16/2021    Procedure: Right Heart Cath;  Surgeon: Amadeo Art MD;  Location:  HEART CARDIAC CATH LAB    CV RIGHT HEART CATH MEASUREMENTS RECORDED N/A 8/5/2021    Procedure: CV RIGHT HEART CATH;  Surgeon: Jeffrey Gibson MD;  Location:  HEART CARDIAC CATH LAB    CV RIGHT HEART CATH MEASUREMENTS RECORDED N/A 8/31/2021    Procedure: Heart Cath Right Heart Cath;  Surgeon: Jeffrey Gibson MD;  Location:  HEART CARDIAC CATH LAB    CV RIGHT HEART CATH MEASUREMENTS RECORDED N/A 9/21/2021    Procedure: CV RIGHT HEART CATH;  Surgeon: Jeffrey Gibson MD;  Location:  HEART CARDIAC CATH LAB    CV RIGHT HEART CATH MEASUREMENTS RECORDED N/A 10/5/2021    Procedure: CV RIGHT HEART CATH;  Surgeon: Jeffrey Gibson MD;  Location:  HEART CARDIAC CATH LAB    CV RIGHT HEART CATH MEASUREMENTS RECORDED N/A 11/4/2021    Procedure: CV RIGHT HEART CATH;  Surgeon: Nicola Seth MD;  Location:  HEART CARDIAC CATH LAB    CV RIGHT HEART CATH MEASUREMENTS RECORDED N/A 5/17/2022    Procedure: Right Heart Catheterization;  Surgeon: Jeffrey Gibson MD;  Location:  HEART CARDIAC CATH LAB    CV RIGHT HEART CATH MEASUREMENTS RECORDED N/A 5/23/2023    Procedure: Right Heart Catheterization;  Surgeon: Scout Robins MD;  Location:  HEART CARDIAC CATH LAB    CV RIGHT HEART CATH MEASUREMENTS  RECORDED N/A 2024    Procedure: Right Heart Cath- pre noon with rush path;  Surgeon: Precious Bautista MD;  Location:  HEART CARDIAC CATH LAB    GI SURGERY      [facility]    INSERT VENTRICULAR ASSIST DEVICE LEFT (HEARTMATE II) N/A 2017    Procedure: INSERT VENTRICULAR ASSIST DEVICE LEFT (HEARTMATE II);  Median Sternotomy Heartmate II Left Ventricular Assist Device Insertion on Pump Oxygenator;  Surgeon: Ronnie Quigley MD;  Location:  OR    IR CHEST TUBE PLACEMENT NON-TUNNELED RIGHT  2021    LAPAROSCOPY DIAGNOSTIC (GENERAL) N/A 2024    Procedure: Diagnostic Laparoscopy, Exploratory Laparotomy with Extensive lysis of adhesions.;  Surgeon: Frederick Montgomery MD;  Location:  OR    ORTHOPEDIC SURGERY      right knee wired    PICC DOUBLE LUMEN PLACEMENT Right 2020    5FR PICC DL. Length-43cm (1cm out).    PICC INSERTION - DOUBLE LUMEN Right 2021    IK/BRACH    RELEASE CARPAL TUNNEL BILATERAL Bilateral 2021    Procedure: Bilateral carpal tunnel release;  Surgeon: Jermaine Brand MD;  Location:  OR    TRANSPLANT HEART RECIPIENT N/A 2021    Procedure: Redo median sternotomy, lysis of adhesions, heart transplant recipient, on cardiopulmonary bypass, intraoperative transesophageal echocardiogram per anesthesia, Implantable Cardioverter Defibrillator (ICD) removal;  Surgeon: Ronnie Quigley MD;  Location:  OR            Family History:   Reviewed and non-contributory.   Family History   Problem Relation Age of Onset    Cerebrovascular Disease Mother 64    Diabetes Mother     Hypertension Mother     Coronary Artery Disease Father     Diabetes Type 2  Father     Obesity Brother     Obesity Brother     Cerebrovascular Disease Daughter 40            Social History:     Social History     Tobacco Use    Smoking status: Former     Current packs/day: 0.00     Types: Cigarettes     Quit date:      Years since quittin.6     Passive exposure: Never    Smokeless tobacco:  Never   Substance Use Topics    Alcohol use: Yes     Comment: on occasion     History   Sexual Activity    Sexual activity: Yes    Partners: Female            Current Medications:     Current Facility-Administered Medications   Medication Dose Route Frequency Provider Last Rate Last Admin    allopurinol (ZYLOPRIM) tablet 100 mg  100 mg Oral Daily Xiomara Carrera MD   100 mg at 08/26/24 0745    bumetanide (BUMEX) tablet 3 mg  3 mg Oral BID Lorenza Chavez MD   3 mg at 08/26/24 1230    buPROPion (WELLBUTRIN XL) 24 hr tablet 300 mg  300 mg Oral QAM Martínez Locke MD   300 mg at 08/26/24 0744    calcitRIOL (ROCALTROL) capsule 0.25 mcg  0.25 mcg Oral Daily Martínez Locke MD   0.25 mcg at 08/26/24 0744    calcium carbonate (TUMS) chewable tablet 1,000 mg  1,000 mg Oral TID w/meals Martínez Locke MD   1,000 mg at 08/26/24 1230    ferrous sulfate (FEROSUL) tablet 325 mg  325 mg Oral BID w/meals Martínez Locke MD   325 mg at 08/26/24 0745    montelukast (SINGULAIR) tablet 10 mg  10 mg Oral At Bedtime Martínez Locke MD   10 mg at 08/25/24 2004    mycophenolate (GENERIC EQUIVALENT) capsule 500 mg  500 mg Oral BID Martínez Locke MD   500 mg at 08/26/24 0744    predniSONE (DELTASONE) tablet 40 mg  40 mg Oral Daily Lorenza Chavez MD        rosuvastatin (CRESTOR) tablet 10 mg  10 mg Oral Daily Martínez Locke MD   10 mg at 08/26/24 0744    sodium chloride (PF) 0.9% PF flush 3 mL  3 mL Intracatheter Q8H Martínez Barth MD   3 mL at 08/26/24 1230    tacrolimus (GENERIC EQUIVALENT) capsule 3.5 mg  3.5 mg Oral BID Xiomara Carrera MD   3.5 mg at 08/26/24 0744    Vitamin D3 (CHOLECALCIFEROL) tablet 25 mcg  25 mcg Oral Daily Martínez Locke MD   25 mcg at 08/26/24 0745    warfarin ANTICOAGULANT (COUMADIN) tablet 5 mg  5 mg Oral ONCE at 18:00 Xiomara Carrera MD        Warfarin Dose Required Daily - Pharmacist Managed  1 each Oral See Admin Instructions Ismail,  Chely Soliz MD                Allergies:     Allergies   Allergen Reactions    Grass Shortness Of Breath    Ace Inhibitors Cough    Cats     Dust Mites Other (See Comments)     Asthma    Mold Other (See Comments)     Asthma    Penicillins Other (See Comments)     Unknown - childhood exposure    Tolerated Zosyn 9/18-9/20/2020    Per patient report he has tolerated amoxicillin    Sulfa Antibiotics Other (See Comments) and Unknown     Unknown childhood reaction.  Tolerated Bactrim 2021            Physical Exam:   Vitals were reviewed  Temp:  [97.1  F (36.2  C)-98.3  F (36.8  C)] 98  F (36.7  C)  Pulse:  [111-126] 116  Resp:  [16-21] 17  BP: ()/(66-83) 97/76  SpO2:  [98 %-100 %] 99 %    Vitals:    08/24/24 0941 08/25/24 0008 08/25/24 1200 08/26/24 0440   Weight: 88.6 kg (195 lb 4.8 oz) 86.6 kg (191 lb) 85.9 kg (189 lb 4.8 oz) 84.9 kg (187 lb 3.2 oz)       Constitutional: Pleasant adult male seen sitting up on edge of bed, in NAD. Awake, alert, interactive.  HEENT: NC/AT, EOMI, sclera clear, conjunctiva non-injected, OP with MMM  Respiratory: +Right upper and middle lobe wheezing with inspiration (clears with cough) and expiration (does not clear with cough). No crackles or rhonchi. No increased work of breathing.  Cardiovascular: RRR, no murmur noted. No peripheral edema.  GI: Soft, nondistended.  Skin: Warm, dry, well-perfused. No bruising, bleeding, rashes, or lesions on limited exam.  Musculoskeletal: Extremities grossly normal without swelling over knees, ankles, wrists, elbows, trace LE edema.   Neurologic: A&O. Answers questions appropriately, speech normal. Moves all extremities spontaneously.  Neuropsychiatric: Calm. Affect appropriate to situation.  Vascular access:  PIV CDI, non-tender, no surrounding erythema.           Laboratory Data:     Microbiology:  Culture   Date Value Ref Range Status   08/25/2024 2+ Serratia marcescens (A)  Preliminary     Comment:     Identification is preliminary,  confirmation in progress   08/25/2024 1+ Normal jaxon  Preliminary   08/24/2024 No growth after 2 days  Preliminary   01/06/2024 3+ Normal jaxon  Final   01/06/2024 1+ Hafnia alvei (A)  Final   01/06/2024 No Growth  Final   01/06/2024 No Growth  Final   12/16/2021 No Growth  Final   12/16/2021 No Growth  Final   08/30/2021 No Actinomyces isolated  Final   08/30/2021 No Legionella species isolated  Final   08/30/2021 No Growth  Final   08/30/2021 Candida albicans (A)  Final   08/30/2021 Candida parapsilosis (A)  Final   08/30/2021 Aspergillus fumigatus (A)  Final   08/30/2021 1+ Normal jaxon  Final   08/30/2021 1+ Staphylococcus aureus (A)  Final   08/24/2021 No Growth  Final   08/24/2021 No Growth  Final   07/11/2021 No anaerobic organisms isolated  Final   07/11/2021 No Growth  Final   07/10/2021 No Growth  Final   07/10/2021 No Growth  Final     Culture Micro   Date Value Ref Range Status   07/09/2021 No growth  Final   07/08/2021 (A)  Preliminary    On day 2, isolated in broth only:  Staphylococcus epidermidis     07/08/2021   Preliminary    Critical Value/Significant Value, preliminary result only, called to and read back by  Neli Boswell RN 1527 7/10/21 AM      07/08/2021 No anaerobes isolated  Final   07/08/2021   Final    Since this specimen was not transported in the proper anaerobic transport media, the   absence of anaerobes in this culture does not rule out the presence of anaerobes in this   specimen.     07/08/2021 No growth  Final   07/08/2021 No growth  Final   06/30/2021 No growth  Final   06/30/2021 No growth  Final   06/29/2021 No growth  Final   06/29/2021 No anaerobes isolated  Final   06/29/2021 Culture negative after 4 weeks  Final   06/29/2021 Culture negative for acid fast bacilli  Final   06/29/2021   Final    Assayed at [facility] [address] [place] [id number] [phone]   06/29/2021 No growth after 4 weeks  Final   06/28/2021 No growth  Final   06/28/2021 (A)  Final     Cultured on the 2nd day of incubation:  Staphylococcus epidermidis     06/28/2021   Final    Critical Value/Significant Value, preliminary result only, called to and read back by  Lisa Rush RN 6/30/21 @ 0427 TF     06/28/2021   Final    (Note)  POSITIVE for STAPHYLOCOCCUS EPIDERMIDIS and POSITIVE for the mecA  gene (resistant to methicillin) by Grataigene multiplex nucleic acid  test. Final identification and antimicrobial susceptibility testing  will be verified by standard methods.    Specimen tested with Verigene multiplex, gram-positive blood culture  nucleic acid test for the following targets: Staph aureus, Staph  epidermidis, Staph lugdunensis, other Staph species, Enterococcus  faecalis, Enterococcus faecium, Streptococcus species, S. agalactiae,  S. anginosus grp., S. pneumoniae, S. pyogenes, Listeria sp., mecA  (methicillin resistance) and Lucía/B (vancomycin resistance).    Critical Value/Significant Value called to and read back by Darcie Saeed RN at 0701 6.30.21. amd     05/26/2021 No growth  Final   05/26/2021 No growth  Final   05/26/2021 No acid fast bacilli isolated after 6 weeks  Final   05/25/2021 Heavy growth  Normal jaxon    Final   05/17/2021 No growth  Final   05/17/2021 No growth  Final   05/15/2021 No growth  Final   11/04/2020 No growth  Final   09/23/2020 No growth  Final   09/22/2020 No growth  Final   09/21/2020 No growth  Final   09/20/2020 No growth  Final   09/19/2020 No growth  Final   09/18/2020 (A)  Final    Cultured on the 2nd day of incubation:  Staphylococcus hominis  Susceptibility testing done on previous specimen     09/18/2020   Final    Critical Value/Significant Value, preliminary result only, called to and read back by  Tamy Leventhal RN 1724 9/20/20 AM     09/18/2020 No growth  Final   09/17/2020 (A)  Final    Cultured on the 1st day of incubation:  Staphylococcus hominis     09/17/2020   Final    Critical Value/Significant Value, preliminary result only, called  to and read back by  John Howard RN @1414 09/18/2020 hdh/lm     09/17/2020   Final    (Note)  POSITIVE for Staphylococci other than S.aureus, S.epidermidis and  S.lugdunensis, by [facility]igene multiplex nucleic acid test.  Coagulase-negative staphylococci are the most common venipuncture or  collection associated skin CONTAMINANTS grown in blood cultures.  Final identification and antimicrobial susceptibility testing will be  verified by standard methods.    Specimen tested with Verigene multiplex, gram-positive blood culture  nucleic acid test for the following targets: Staph aureus, Staph  epidermidis, Staph lugdunensis, other Staph species, Enterococcus  faecalis, Enterococcus faecium, Streptococcus species, S. agalactiae,  S. anginosus grp., S. pneumoniae, S. pyogenes, Listeria sp., mecA  (methicillin resistance) and Lucía/B (vancomycin resistance).    Critical Value/Significant Value called to and read back by Le Carmona RN. @1712. 9.18.20. BS.      09/17/2020 No growth  Final   01/21/2020 No growth  Final   01/21/2020 (A)  Final    Heavy growth  Haemophilus influenzae  Beta lactamase negative  Beta-lactamase negative Haemophilus influenzae are usually susceptible to ampicillin,   amoxacillin/clavulanic acid, levofloxacin, and 3rd generation cephalosporins, such as   ceftriaxone.     01/20/2020 No growth  Final   01/20/2020 No growth  Final   01/16/2020 (A)  Final    10,000 to 50,000 colonies/mL  Coagulase negative Staphylococcus     01/15/2020 No growth  Final   01/15/2020 (A)  Final    Cultured on the 1st day of incubation:  Staphylococcus epidermidis     01/15/2020   Final    Critical Value/Significant Value, preliminary result only, called to and read back by  Alphonse Cuba RN @ 1920. 1/16/20. AV     01/15/2020   Final    (Note)  POSITIVE for STAPHYLOCOCCUS EPIDERMIDIS and NEGATIVE for the mecA  gene (not resistant to methicillin) by Verigene nucleic acid test.  The mecA gene was not detected.  Final identification and  antimicrobial susceptibility testing will be verified by standard  methods.    Specimen tested with Verigene multiplex, gram-positive blood culture  nucleic acid test for the following targets: Staph aureus, Staph  epidermidis, Staph lugdunensis, other Staph species, Enterococcus  faecalis, Enterococcus faecium, Streptococcus species, S. agalactiae,  S. anginosus grp., S. pneumoniae, S. pyogenes, Listeria sp., mecA  (methicillin resistance) and Lucía/B (vancomycin resistance).    Critical Value/Significant Value called to and read back by Dr. Leos, @2214 01/16/20..     04/13/2018 No growth  Final   04/13/2018 No growth  Final   05/24/2017 No growth  Final   05/23/2017 Moderate growth Normal jaxon  Final       Inflammatory Markers    Recent Labs   Lab Test 08/27/21  0550 07/19/21  0630 07/18/21  0552 07/16/21  0614 07/09/21  0632 07/08/21  1746 07/08/21  1533 06/28/21  2208 05/27/21  0449 05/13/21  0534 10/27/20  1250 10/20/20  1305 10/13/20  1320 10/06/20  1320 09/30/20  1130 07/06/18  0856 04/13/18  1437 12/19/17  1002 05/26/17  0700   SED  --   --   --   --   --  72*  --   --   --   --  10 8 10 20 9  --  18  --  25*   .0* 13.0* 7.7 4.2 98.0*  --  87.0* 27.0* 140.0*   < > 3.5 11.0* 12.0* 5.7 0.6*   < > 9.4*   < >  --     < > = values in this interval not displayed.       Metabolic Studies       Recent Labs   Lab Test 08/26/24  0513 08/25/24  1918 08/25/24  0658 08/25/24  0502 08/24/24  0920 08/24/24  0516 08/23/24  2324 06/04/24  1250 06/03/24  1049 01/07/24  0804 01/07/24  0707 07/12/23  1547 05/23/23  0812 01/16/23  1415 09/15/22  1102   * 134* 135 135  --  137 136   < > 137   < > 138   < > 139   < > 137  137   POTASSIUM 4.4 4.4 4.4 4.5  --  4.9 5.0   < > 5.5*   < > 3.4   < > 4.8   < > 4.6  4.7   CHLORIDE 96* 95* 98 97*  --  100 99   < > 106   < > 108*   < > 108*   < > 104  104   CO2 22 24 22 22  --  21* 22   < > 19*   < > 17*   < > 18*   < > 20*  21*   ANIONGAP 16*  15 15 16*  --  16* 15   < > 12   < > 13   < > 13   < > 13  12   .0* 110.0* 109.0* 108.0*  --  102.0* 99.7*   < > 76.2*   < > 35.3*   < > 36.0*   < > 36.5*  36.6*   CR 4.97* 4.78* 4.62* 4.65*  --  4.54* 4.68*   < > 3.82*   < > 1.43*   < > 1.75*   < > 1.72*  1.73*   GFRESTIMATED 12* 13* 13* 13*  --  13* 13*   < > 17*   < > 54*   < > 43*   < > 44*  43*   * 103* 99 98  --  94 199*   < > 145*   < > 111*   < > 89   < > 107*  108*   A1C  --   --   --   --   --   --   --   --  5.6   < >  --    < >  --    < >  --    QAMAR 8.6* 8.8 8.5* 8.7*  --  8.8 8.7*   < > 8.4*   < > 8.4*   < > 7.6*   < > 8.9  9.0   PHOS 6.6* 6.2* 6.1* 6.2*  --  6.4* 6.5*   < >  --    < >  --    < > 4.0   < > 4.4   MAG 2.3 2.3 2.2 2.2  --  2.3 2.3   < >  --    < >  --    < > 2.2   < > 2.0   LACT  --   --   --   --  2.0  --   --   --   --   --  1.0   < >  --   --   --    CKT  --   --   --   --   --   --   --   --   --   --   --   --  281  --  190    < > = values in this interval not displayed.       Hepatic Studies    Recent Labs   Lab Test 08/26/24  0513 08/25/24  1918 08/25/24  0658 08/25/24  0502 08/24/24  0516 08/23/24  2324 07/12/24  1357 07/05/24  1143 06/20/24  1040 05/24/24  1537 07/14/21  0554 07/13/21 2053   BILITOTAL 0.5 0.5 0.7 0.6 0.7 0.6  --  0.5   < >  --    < >  --    ALKPHOS 84 93 85 95 97 105  --  89   < >  --    < >  --    ALBUMIN 3.9 4.1 3.9 4.0 4.3 4.4   < > 4.1   < >  --    < >  --    AST 16 18 21 21 41  --   --  31   < >  --    < >  --    ALT 14 16 17 16 25 25  --  20   < >  --    < >  --    LDH  --   --   --   --   --   --   --   --   --  194  --  252*    < > = values in this interval not displayed.       Pancreatitis testing    Recent Labs   Lab Test 01/17/24  0827 01/03/24  1324 05/23/23  0812 09/15/22  1102 05/17/22  0746 09/21/21  0821 05/26/21  1340 05/04/21  2313 11/05/20  0907 08/05/20  0335   AMYLASE  --   --   --   --   --   --  49 93  --   --    LIPASE  --  19  --   --   --   --  25*  --   --  168    TRIG 79  --  49 82 64 94  --   --  91  --        Hematology Studies      Recent Labs   Lab Test 08/26/24  0513 08/25/24  0502 08/24/24  0516 08/23/24  2324 08/19/24  1258 07/12/24  1357 07/05/24  1143 05/29/24  0600 09/04/21  1034 09/02/21  0620 09/01/21  0637 08/31/21  0958 08/31/21  0624 08/30/21  0627 08/29/21  0825 08/28/21  0633 08/27/21  0550   WBC 5.9 5.9 5.9 6.6  --   --  4.6 5.4   < > 6.5 5.0  --  4.9 3.8* 3.6*   < > 2.6*   ANEU  --   --   --   --   --   --   --   --   --  3.4 2.0  --  1.7 1.8 1.7  --  1.3*   ALYM  --   --   --   --   --   --   --   --   --  1.3 1.5  --  1.7 1.2 0.9  --  0.7*   VIJAY  --   --   --   --   --   --   --   --   --  1.1 1.1  --  0.9 0.6 0.8  --  0.4   AEOS  --   --   --   --   --   --   --   --   --  0.2 0.0  --  0.2 0.2 0.1  --  0.1   HGB 9.9* 10.4* 10.2* 10.6* 10.5* 11.4* 10.5* 10.4*   < > 8.0* 7.8*   < > 8.3* 7.4* 7.7*   < > 7.1*   HCT 32.1* 33.7* 33.3* 34.7*  --   --  34.4* 32.5*   < > 26.8* 26.6*  --  28.0* 25.2* 25.3*   < > 23.7*    164 176 178  --   --  182 192   < > 283 288  --  275 234 253   < > 226    < > = values in this interval not displayed.       Arterial Blood Gas Testing    Recent Labs   Lab Test 01/05/24  0335 01/04/24  1713 01/04/24  0940 01/04/24  0655 01/04/24  0517   PH 7.27* 7.24* 7.23* 7.16* 7.08*   PCO2 40 42 44 55* 71*   PO2 83 109* 114* 86 89   HCO3 19* 18* 18* 20* 21   O2PER 30 40 50 21 60.0        Urine Studies     Recent Labs   Lab Test 07/05/24  1546 05/21/24  1306 05/20/24  1351 03/21/24  1007 01/06/24  1351   URINEPH 5.0 5.5 6.5 5.5 5.5   NITRITE Negative Negative Negative Negative Negative   LEUKEST Negative Negative Negative Trace* Negative   WBCU 1 <1 <1 5-10* 1       Vancomycin Levels     Recent Labs   Lab Test 05/08/21  2004 05/07/21  1701 10/27/20  1250 10/20/20  1305 10/13/20  1320 10/06/20  1320   VANCOMYCIN 18.5 16.0 19.3 16.5 13.4 16.1       Tobramycin levels     No lab results found.    Gentamicin levels    No lab results  "found.    CSF testing   No lab results found.    Hepatitis B Testing   Recent Labs   Lab Test 06/07/21  0807 05/04/21  2313   HBCAB Nonreactive Nonreactive   HEPBANG Nonreactive Nonreactive       Hepatitis C Testing     Hepatitis C Antibody   Date Value Ref Range Status   06/07/2021 Nonreactive NR^Nonreactive Final     Comment:     Assay performance characteristics have not been established for newborns,   infants, and children     05/04/2021 Nonreactive NR^Nonreactive Final     Comment:     Assay performance characteristics have not been established for newborns,   infants, and children         Respiratory Virus Testing    No results found for: \"RS\", \"FLUAG\"    Last check of C difficile  C Difficile Toxin B by PCR   Date Value Ref Range Status   09/13/2021 Negative Negative Final     Comment:     A negative result does not exclude actual disease due to C. difficile and may be due to improper collection, handling and storage of the specimen or the number of organisms in the specimen is below the detection limit of the assay.              Imaging:     CT Chest w/o contrast 8/25/23  IMPRESSION:   1. Opacities in the right lower lobe base with associated  bronchiectasis and bronchial wall thickening concerning for  infectious/inflammatory process.  2. Posterior changes of heart transplantation without evidence of  associated complications.  3. Post surgical changes of Sylvester-en-Y gastric bypass.    CXR 8/23/24  IMPRESSION: Multiple median sternotomy wires. Portions of an intravenous left subclavian lead, unchanged. No focal area of air space consolidation. No pleural effusion or pneumothorax. The heart remains mildly enlarged.       ECHO:  8/24/24  Interpretation Summary  Global and regional left ventricular function is normal with an EF of 55-60%.  Global right ventricular function is normal.  No significant valvular abnormalities were noted.  No pericardial effusion is present.  This study was compared with the study " from 5/28/24: No significant changes  noted.

## 2024-08-27 NOTE — PROGRESS NOTES
Care Management Follow Up    Length of Stay (days): 4    Expected Discharge Date: 08/30/2024     Concerns to be Addressed: discharge planning     Patient plan of care discussed at interdisciplinary rounds: Yes    Anticipated Discharge Disposition:  Home     Anticipated Discharge Services:  TBD  Anticipated Discharge DME:  TBD    Patient/family educated on Medicare website which has current facility and service quality ratings:  N/A  Education Provided on the Discharge Plan:  N/A  Patient/Family in Agreement with the Plan:  N/A    Referrals Placed by CM/SW:    Private pay costs discussed: Not applicable    Discussed  Partnership in Safe Discharge Planning  document with patient/family: No     Handoff Completed: No, handoff not indicated or clinically appropriate    Additional Information:  SW met with patient to introduce self and to discuss social work transition. Patient denied questions about transition. Patient shared feeling is great granddaughter was an inspiration to him. He inquired about potential support available to him for medications and food support. He indicated financial income is Social Security and that medications can be unmanageable at times and that getting healthy fruits and snacks for his great granddaughter can be hard.    SW reached out to pharmacy liaison to look into financial programs. Pharmacy liaison to reach out to Pt. SW provided patient information on local food duong to support food intake and variety. SW additionally provided Pt support group information via [employer]. Patient denied additional questions or support needed at this time.    Bhumi Marshall, MSW, LGSW, APSW  [facility] Transplant/[facility]   [facility]  Ph. [phone]

## 2024-08-27 NOTE — PROGRESS NOTES
Nephrology Progress Note  08/27/2024           ASSESSMENT AND RECOMMENDATIONS:     Jim Willingham is a 69 year old male with PMH including CHF, CKD, prior gastric bypass, COPD, depression, NICM (s/p HM2 LVAD 6/2017) s/p OHT 5/6/21. Nephrology is consulted for CKD management.     # CKD stage V   # Cystatin C and Cr are concordant  CKD diagnosed long ago presumed from DM,HTN and cardiorenal from heart failure. Later creatinine increased following OHT and long term CNI use. Rapid progression of kidney dysfunction in the past six months from creatinine 1.58 and GFR 48 to creatinine to 4 to 4.5 and GFR < 15.Serologic work including c3, ,c4, dsDNA, ANCA, monoclonal screening were negative. 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 Renal imaging without obstruction. The reason for rapid progression to ESRD unclear. Suspect hemodynamic changes in the setting of CNI use.  He has attended the [facility] program and is interested in PD. Current creatinine pretty much at baseline however anticipate the need  for RRT soon likely in 2-3 months.   # BP/Volume  BP well controlled. Weight increased by 10 lbs last one week. CXR is equivocal for pulmonary congestion. Echo on 8/24/24 reported normal, EF 55-60% and normal RV function.   #  Anemia of renal disease: HB at goal. Low iron studies. Did not tolerate IV iron. On PTA oral iron   # BMD : Hyperphosphatemia and secondary hyperparathyroidism On PTA Calcitriol 0.25 mcg every day and TUMS 1gm tid with meal. But calcium improved so I reduced Tums to 1000 mg daily. Started Sevelamer 400 mg TID on 8/27/24.   # COPD, Suspect for superimposed pneumonia; now on Cipro.  #S/p heart transplant ; on tacrolimus and MMF      Recommendations   - Continue Bumex to oral 3 mg BID (home dose)  - He has an appointment with Fiordaliza English on 9/16/24  - Rapid progression for unclear reason, kidney biopsy deferred  "because high risk of bleeding (on warfarin ) and high chance getting scar tissue which won't change the management.  - He met with surgeon before regarding PD candidacy and PD is not a guarantee now he is leaning towards HD  - Continue PTA oral iron, no indication for ALOK therapy now  - PhosLo has interaction with Cipro so will start Sevelamer 400 mg TID instead  - Reduce Tums to 1 tab BID with meal  - Continue calcitriol 0.25 mcg po daily   - daily renal panel  - Monitor I/O and daily weight      Recommendations were communicated to primary team via note.     Manuel Lynch MD   Division of Renal Disease and Hypertension  [facility]  [employer] Web Console    Interval History :   Nursing and provider notes from last 24 hours reviewed.    Yesterday Bumex IV was switched to 3 mg oral twice a day.  He received 2 doses of Bumex yesterday.  He urinated 2.5 L and today BW is down to 181 Lbs.  Creatinine slightly increased to 5.2 with EGFR 11, , potassium 4.5 and bicarb 22. Phos down to 6.1. Has not start PhosLo yet.     He feels much better today still has some cough but energy and cough has improved. Cefepime was started.     Physical Exam:   I/O last 3 completed shifts:  In: 750.66 [P.O.:720; I.V.:30.66]  Out: 3300 [Urine:3300]   BP 98/72 (BP Location: Right arm)   Pulse 116   Temp 97.7  F (36.5  C) (Oral)   Resp 20   Ht 1.727 m (5' 8\")   Wt 82.3 kg (181 lb 6.4 oz)   SpO2 98%   BMI 27.58 kg/m       GENERAL APPEARANCE: not distress,  awake  EYES: no scleral icterus, pupils equal  Pulmonary: Faint diffuse expiratory wheezes bilaterally,.  CV: regular rhythm, normal rate, no rub   - JVD -ve   - Edema -ve  GI: soft, nontender, normal bowel sounds  MS: no evidence of inflammation in joints, no muscle tenderness  : no christopher  SKIN: no rash, warm, dry, no cyanosis  NEURO: face symmetric, no asterixis     Labs:   All labs reviewed by me  Electrolytes/Renal -   Recent Labs   Lab Test 08/27/24  0614 " 08/26/24  1736 08/26/24  0513   * 134* 134*   POTASSIUM 4.5 4.7 4.4   CHLORIDE 94* 94* 96*   CO2 22 22 22   .0* 112.0* 113.0*   CR 5.20* 5.03* 4.97*   * 154* 105*   QAMAR 8.8 9.2 8.6*   MAG 2.3 2.4* 2.3   PHOS 6.1* 6.4* 6.6*       CBC -   Recent Labs   Lab Test 08/27/24  0614 08/26/24  0513 08/25/24  0502   WBC 3.9* 5.9 5.9   HGB 10.6* 9.9* 10.4*    161 164       LFTs -   Recent Labs   Lab Test 08/27/24  0614 08/26/24  1736 08/26/24  0513   ALKPHOS 91 96 84   BILITOTAL 0.4 0.6 0.5   ALT 9 12 14   AST 13 19 16   PROTTOTAL 6.5 7.3 6.6   ALBUMIN 4.0 4.3 3.9       Iron Panel -   Recent Labs   Lab Test 08/19/24  1258 05/23/24  0511 08/05/21  0931   IRON 40* 16* 30*   IRONSAT 16 5* 13*   VITA 151 24* 98         Imaging:  All imaging studies reviewed by me.     Current Medications:  Current Facility-Administered Medications   Medication Dose Route Frequency Provider Last Rate Last Admin    allopurinol (ZYLOPRIM) tablet 100 mg  100 mg Oral Daily Xiomara Carrera MD   100 mg at 08/26/24 0745    bumetanide (BUMEX) tablet 3 mg  3 mg Oral BID Lorenza Chavez MD   3 mg at 08/26/24 1609    buPROPion (WELLBUTRIN XL) 24 hr tablet 300 mg  300 mg Oral QAM Martínez Locke MD   300 mg at 08/26/24 0744    calcitRIOL (ROCALTROL) capsule 0.25 mcg  0.25 mcg Oral Daily Martínez Locke MD   0.25 mcg at 08/26/24 0744    calcium carbonate (TUMS) chewable tablet 1,000 mg  1,000 mg Oral TID w/meals Martínez Locke MD   1,000 mg at 08/26/24 1750    ciprofloxacin (CIPRO) tablet 500 mg  500 mg Oral Daily Lorenza Chavez MD   500 mg at 08/26/24 1609    ferrous sulfate (FEROSUL) tablet 325 mg  325 mg Oral BID w/meals Martínez Locke MD   325 mg at 08/26/24 1751    montelukast (SINGULAIR) tablet 10 mg  10 mg Oral At Bedtime Martínez Locke MD   10 mg at 08/26/24 2029    mycophenolate (GENERIC EQUIVALENT) capsule 500 mg  500 mg Oral BID Martínez Locke MD   500 mg at 08/26/24 2029     predniSONE (DELTASONE) tablet 40 mg  40 mg Oral Daily Lorenza Chavez MD   40 mg at 08/26/24 1609    rosuvastatin (CRESTOR) tablet 10 mg  10 mg Oral Daily Martínez Locke MD   10 mg at 08/26/24 0744    sodium chloride (PF) 0.9% PF flush 3 mL  3 mL Intracatheter Q8H Martínez Barth MD   3 mL at 08/26/24 2031    tacrolimus (GENERIC EQUIVALENT) capsule 3.5 mg  3.5 mg Oral BID Xiomara Carrera MD   3.5 mg at 08/26/24 1750    Vitamin D3 (CHOLECALCIFEROL) tablet 25 mcg  25 mcg Oral Daily Martínez Locke MD   25 mcg at 08/26/24 0745    Warfarin Dose Required Daily - Pharmacist Managed  1 each Oral See Admin Instructions Chely Wynn MD         Current Facility-Administered Medications   Medication Dose Route Frequency Provider Last Rate Last Admin    medication instruction   Does not apply Continuous PRN Martínez Barth MD        Patient is already receiving anticoagulation with heparin, enoxaparin (LOVENOX), warfarin (COUMADIN)  or other anticoagulant medication   Does not apply Continuous PRN Martínez Locke MD Nattawat Klomjit, MD

## 2024-08-27 NOTE — PROGRESS NOTES
[facility] [address] [address]  Phone: [phone]    Patient:  Jim Willingham, Date of birth 1955  MRN: [id number]  8/23/2024  Date of Visit:  08/27/2024  Referring Provider Xiomara Carrera      Assessment & Plan    Recommendations:   Continue cefepime while waiting for susceptibilities   Plan for total of 7 days (today is day #2/7) for Serratia pneumonia vs COPD exacerbation  Agree with steroid burst of prednisone 40mg x5d  Will need outpatient follow up with Pulmonology      Yadira Young PA-C  Pronouns: she/her/hers  Infectious Diseases  Contact via [employer] or [employer] Paging/Directory      Assessment:  Jim Willingham is a 69 year old male who underwent OHT 5/6/21 for NICM (s/p HM2 LVAD 6/2017), CHF, CKD stage V, COPD, 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      Afebrile (reports temperature elevates at 99F at home), VSS, no hypoxia. Small amount of RLL infiltrate on imaging. Serratia marcescens on sputum culture. Based on history of several family members with URI symptoms with later development of productive cough, suspect that patient had a viral URI and has developed a superimposed bacterial pneumonia vs COPD exacerbation. Antibiotics are appropriate treatment for either of these processes. Based on his clinical picture, PO abx are appropriate. Started on cipro after discussion with cardiology, changed to cefepime due to concerns regarding QTc on repeat EKG. Awaiting susceptibilities- should be back on AM of 8/28. Anticipate using PO abx to complete course.            Previous ID Issues:  - SBO requiring surgical intervention (1/4/24) and large aspiration event per-op. Sputum cx 1/2024 with Hafnia alvei (R: Amp, Amp/Sulbactam, Pip/Tazo) treated with Cefepime--> levofloxacin  - Left upper lobe cavitary  lesion, A.fumigatus on BAL (8/30/21). S/p voriconazole x2-3 months (finished 11/2021)  - Hx COVID pneumonia s/p monoclonal Ab and 5 days of remdesivir (8/2021)  - S.hominis (Methicillin susceptible) bacteremia (9/2020), presumed LVAD related and treated x6 weeks  - MRSE in hand joint, broth only (7/2021)- deemed contaminant with inflamed joint due to pseudogout  - RLL pneumonia with exudative pleural effusion (7/2021), effusion cultures were negative. Urine histo and blasto antigens negative, Aspergillus galactomannan negative, BD glucan intermediate. Treated with IV abx then levofloxacin. Improvement on CT chest 7/23/21.   - H.influenzae pneumonia (1/2020)  - Low-grade EBV viremia        Transplant check list:  - QTc interval:  568 msec on 8/23/24 (not corrected for RBBB)  - Bacterial prophylaxis:  none  - Pneumocystis prophylaxis:  none (s/p 6 months of ppx)  - Viral serostatus: CMV D+/R+, EBV D+/R+, HSV1+/2-, VZV +, Toxo D-/R-   - Viral prophylaxis:  none  - Fungal prophylaxis:  none  - Immunosuppression: mycophenolate, tac  - Immunization status:  Candidate for Shingrix and RSV vaccines  - Gamma globulin status:  last checked 2020  - Isolation status: good hand hygiene        Billing:   [facility]  Time Attestation:  Total duration of visit including chart review, reviewing labs and imaging, interviewing and examining the patient, documentation, and sending communication to the primary treating team, all at the same day of this encounter, is: 45 minutes.        History of Present Illness   Pertinent history obtain from: chart review and patient  Afebrile, VSS. WBC 5.9-->3.9. Feeling much better this morning. Cough was less overnight, no longer feels wheezy. Less sputum production. No new symptoms or concerns. Hoping to be able to go home soon.    Antibiotics:   - Cipro 8/26-present    Prophylaxis: none    Physical Exam     Vital signs:  BP 98/72 (BP Location: Right arm)   Pulse 116   Temp 97.7  F (36.5  C) (Oral)    "Resp 20   Ht 1.727 m (5' 8\")   Wt 82.3 kg (181 lb 6.4 oz)   SpO2 98%   BMI 27.58 kg/m      GENERAL: Pleasant and interactive. Moves to seated on edge of bed without assistance, alert and no distress  RESP: lungs clear to auscultation - no rales, rhonchi or wheezes  CV: regular rate and rhythm, normal S1 S2, no S3 or S4, no murmur, click or rub, no significant peripheral edema  SKIN: Warm, non-diaphoretic. No suspicious lesions or rashes    Data   Laboratory data and imaging listed below was reviewed prior to this encounter.     Microbiology:    Culture   Date Value Ref Range Status   08/25/2024 2+ Serratia marcescens (A)  Preliminary   08/25/2024 1+ Normal jaxon  Preliminary   08/24/2024 No growth after 3 days  Preliminary   01/06/2024 3+ Normal jaxon  Final   01/06/2024 1+ Hafnia alvei (A)  Final   01/06/2024 No Growth  Final   01/06/2024 No Growth  Final   12/16/2021 No Growth  Final   12/16/2021 No Growth  Final   08/30/2021 No Actinomyces isolated  Final   08/30/2021 No Legionella species isolated  Final   08/30/2021 No Growth  Final   08/30/2021 Candida albicans (A)  Final   08/30/2021 Candida parapsilosis (A)  Final   08/30/2021 Aspergillus fumigatus (A)  Final   08/30/2021 1+ Normal jaxon  Final   08/30/2021 1+ Staphylococcus aureus (A)  Final   08/24/2021 No Growth  Final   08/24/2021 No Growth  Final   07/11/2021 No anaerobic organisms isolated  Final   07/11/2021 No Growth  Final   07/10/2021 No Growth  Final   07/10/2021 No Growth  Final   , Hematology Studies:    Recent Labs   Lab Test 08/27/24  0614 08/26/24  0513 08/25/24  0502 08/24/24  0516 08/23/24  2324 08/19/24  1258 07/12/24  1357 07/05/24  1143 09/04/21  1034 09/02/21  0620 09/01/21  0637 08/31/21  0958 08/31/21  0624 08/30/21  0627 08/29/21  0825 08/28/21  0633 08/27/21  0550   WBC 3.9* 5.9 5.9 5.9 6.6  --   --  4.6   < > 6.5 5.0  --  4.9 3.8* 3.6*   < > 2.6*   ANEU  --   --   --   --   --   --   --   --   --  3.4 2.0  --  1.7 1.8 1.7  --  " 1.3*   AEOS  --   --   --   --   --   --   --   --   --  0.2 0.0  --  0.2 0.2 0.1  --  0.1   HGB 10.6* 9.9* 10.4* 10.2* 10.6* 10.5*   < > 10.5*   < > 8.0* 7.8*   < > 8.3* 7.4* 7.7*   < > 7.1*   MCV 94 95 95 96 96  --   --  96   < > 90 91  --  92 90 89   < > 91    161 164 176 178  --   --  182   < > 283 288  --  275 234 253   < > 226    < > = values in this interval not displayed.    , and Metabolic Studies:   Recent Labs   Lab Test 08/27/24  0614 08/26/24  1736 08/26/24  0513 08/25/24  1918 08/25/24  0658   * 134* 134* 134* 135   POTASSIUM 4.5 4.7 4.4 4.4 4.4   CHLORIDE 94* 94* 96* 95* 98   CO2 22 22 22 24 22   .0* 112.0* 113.0* 110.0* 109.0*   CR 5.20* 5.03* 4.97* 4.78* 4.62*   GFRESTIMATED 11* 12* 12* 13* 13*

## 2024-08-27 NOTE — PROGRESS NOTES
08/27/24 0915   Appointment Info   Signing Clinician's Name / Credentials (SLP) Elizabeth Ba MA CCC-SLP   General Information   Onset of Illness/Injury or Date of Surgery 08/23/24   Referring Physician Lorenza Chavez MD   Pertinent History of Current Problem Jim Willingham is a 69-year-old male with PMH including CHF, CKD, prior gastric bypass, anemia, COPD, depression, NICM (s/p HM2 LVAD 6/2017), and s/p OHT 5/6/21. He was admitted for acute heart failure exacerbation and WALTER on CKD.  Clinical swallow evaluation completed per MD order.       Present no   Pain Assessment   Patient Currently in Pain No   Type of Evaluation   Type of Evaluation Swallow Evaluation   Oral Motor   Oral Musculature generally intact   Structural Abnormalities none present   Mucosal Quality good   Oral Motor Deficits Observed Dentition (Oral Motor) (Group)   Dentition (Oral Motor)   Dentition (Oral Motor) some missing teeth;other (see comments)  (missing molars)   Facial Symmetry (Oral Motor)   Facial Symmetry (Oral Motor) WNL   Lip Function (Oral Motor)   Lip Range of Motion (Oral Motor) WNL   Tongue Function (Oral Motor)   Tongue Coordination/Speed (Oral Motor) WNL   Tongue ROM (Oral Motor) WNL   Cough/Swallow/Gag Reflex (Oral Motor)   Volitional Throat Clear/Cough (Oral Motor) WNL   Volitional Swallow (Oral Motor) WNL   Vocal Quality/Secretion Management (Oral Motor)   Vocal Quality (Oral Motor) WNL   Secretion Management (Oral Motor) WNL   General Swallowing Observations   Past History of Dysphagia None per pt report.  Pt with speech orders on 8/2020 but deferred evaluation d/t tolerating regular diet.  Pt w/ right lower lobe pneumonia.   Respiratory Support room air   Current Diet/Method of Nutritional Intake (General Swallowing Observations, NIS) NPO   Swallowing Evaluation Clinical swallow evaluation   Clinical Swallow Evaluation   Feeding Assistance no assistance needed   Clinical Swallow Evaluation  Textures Trialed thin liquids;pureed;solid foods   Clinical Swallow Eval: Thin Liquid Texture Trial   Mode of Presentation, Thin Liquids spoon;cup;straw;self-fed   Volume of Liquid or Food Presented 3 oz   Oral Phase of Swallow WFL   Pharyngeal Phase of Swallow impaired;throat clearing   Diagnostic Statement Delayed throat clear x1 following consecutive sips of water via straw.  Suspect d/t pneumonia rather than aspiration.  No s/sx of aspiration throughout evaluation.   Clinical Swallow Evaluation: Puree Solid Texture Trial   Mode of Presentation, Puree self-fed;spoon   Volume of Puree Presented 3 tablespoons   Oral Phase, Puree WFL   Pharyngeal Phase, Puree intact   Diagnostic Statement No overt s/sx of aspiration   Clinical Swallow Evaluation: Solid Food Texture Trial   Mode of Presentation self-fed   Volume Presented 1 abhishek cracker   Oral Phase WFL   Pharyngeal Phase intact   Diagnostic Statement No overt s/sx of aspiration   Esophageal Phase of Swallow   Patient reports or presents with symptoms of esophageal dysphagia No   Swallowing Recommendations   Diet Consistency Recommendations thin liquids (level 0);regular diet   Supervision Level for Intake patient independent   Monitoring/Assistance Required (Eating/Swallowing) stop eating activities when fatigue is present   Recommended Feeding/Eating Techniques (Swallow Eval) patient is independent, no specific recommendations   Medication Administration Recommendations, Swallowing (SLP) Orally   Instrumental Assessment Recommendations instrumental evaluation not recommended at this time   General Therapy Interventions   Planned Therapy Interventions Dysphagia Treatment   Dysphagia treatment Instruction of safe swallow strategies   Clinical Impression   Criteria for Skilled Therapeutic Interventions Met (SLP Eval) Yes, treatment indicated   SLP Diagnosis Suspected WNL oropharyngeal swallowing abilities   Risks & Benefits of therapy have been explained  evaluation/treatment results reviewed;care plan/treatment goals reviewed;risks/benefits reviewed;current/potential barriers reviewed;participants voiced agreement with care plan;participants included;patient   Clinical Impression Comments Clinical swallow evaluation completed per MD order.  Pt presents w/ suspected WNL oropharyngeal swallowing abilities.  Pt w/ dry, productive cough at baseline. Oral mech remarkable for bottom missing molars.  Pt assessed with ice chips, thin liquids via cup and straw, puree and regular solids.  Oral phase observed to be unremarkable.  Pharyngeal phase remarkable for s/sx of aspiration marked by delayed throat clear following consecutive sips of water via straw. Suspect throat clear d/t pneumonia vs aspiration.      Recommend regular diet and thin liquids.  Please ensure pt is upright and alert for all PO intake, and takes small bites/sips at a slow rate. Speech to follow for short course.   SLP Discharge Planning   SLP Discharge Recommendation home with assist   SLP Rationale for DC Rec Suspect pt will meet swallowing goals prior to d/c   SLP Brief overview of current status  Recommend regular diet and thin liquids. Please ensure pt is upright and alert for all PO intake, and takes small bites/sips at a slow rate. Speech to follow for short course.

## 2024-08-27 NOTE — PLAN OF CARE
Temp: 97.9  F (36.6  C) Temp src: Oral BP: 91/73 Pulse: 118   Resp: 18 SpO2: 98 % O2 Device: None (Room air)       Hours of care: 3916-2158    D: PMHx of CHF, CKD, prior gastric bypass, anemia, COPD, depression, NICM (s/p HM2 LVAD 6/2017), and s/p OHT 5/6/21. He was admitted 8/23/24 for acute heart failure exacerbation and WALTER on CKD.      I/A: [place] (he/him) is A/O x4. Calls appropriately, calm and cooperative. Tele in place, ST, HR 115s-120s. VSS on RA. R PIV in place SL. No new skin deficits noted. NPO since 0000 for possible RHC. Up ad [place] independently, adequate UOP, no BM this shift. Appeared to sleep well overnight.    Changed:  Running:   PRN: tylenol x1    P: Continue to monitor and follow POC. Possible RHC today. Notify CARDS 2 with changes.    Goal Outcome Evaluation:    Plan of Care Reviewed With: patient  Overall Patient Progress: improving  Outcome Evaluation: VSS. RA. ST, rates in 115s-120s, team is aware. Pain controlled on current regimen. NPO since 0000 for possible RHC today.

## 2024-08-27 NOTE — PROGRESS NOTES
[facility]  CARDIOLOGY HEART FAILURE SERVICE (CARDS II) PROGRESS NOTE    Patient Name: Jim Willingham    Medical Record Number: [id number]    YOB: 1955 69 year old   PCP: Ricardo Gómez    Admit Date/Time: 8/23/2024 10:45 PM   4    Assessment and Plan:  Jim Willingham is a 69-year-old male with PMH including CHF, CKD, prior gastric bypass, anemia, COPD, depression, NICM (s/p HM2 LVAD 6/2017), and s/p OHT 5/6/21. He was admitted for acute heart failure exacerbation and WALTER on CKD.    Today's update:  - Continue PTA PO Bumex 3 mg BID  - Will start Cefepime 1g q24hr IV starting tomorrow while waiting for sensitivity results  - Speech recommend regular diet at this point and they will monitor the pt    #Acute on CHF Exacerbation  #Immunosuppressed s/p OHT 5/6/21  There is no history of rejection. Troponin is flat, decreasing from 156 to 152. NT Pro BNP was elevated at 28,847. The EKG did not show any acute ST or T wave changes. The echo from 8/24/24 is unchanged from 5/28/24. His weight today is 181 lbs, with EDW of 185 lbs. Since he is starting Prednisone, and this can cause fluid retention, will continue 3mg BID Bumex.     Volume: Euvolemic. PO Bumex 3 mg BID  Continue immunosuppressant regimen: Tacrolimus (goal 4-6),  MG BID   PPx:  CAV: Aspirin 81mg daily and rosuvastatin 10mg daily.  GI: Pantoprazole 40mg daily  Osteoporosis: Calcium/vitamin D supplements    #WALTER on CKD IV/V  He follows with nephrology as an outpatient and has attended a kidney smart program. The patient previously expressed interest in PD however it seems like that will not be an option. He plans to talk to nephrology about HD. Creatinine on admission was 4.68, has been slowly increasing.  - No need to start dialysis for now per nephrology  - BMP daily  - Diuretics as above  - Continue PTA calcium and vitamin D supplementation  - Will likely need RRT in 2-3 months per Neph, outpatient HD  eval for fistula vs graft    #Community Acquired Bacterial Pneumonia  #COPD, possible exacerbation  He is maintaining O2 sats on room air and has had intermittent episodes of a productive cough. The patient received DuoNeb at OSH with significant improvement in symptoms. He is afebrile. The respiratory panel is negative. Sputum culture grew serratia marcescens, sensitivity pending. ID consulted and recommend Cipro vs Bactrim vs Cefepime.   - Has received 2 doses of Ciprofloxacin however with Qtc of 585 will discontinue this. Ordered Cefepime 1g q24hr IV per ID recommendations. Final abx plan pending sensitivities.   - Continue DuoNebs 4 times daily as needed for wheezing/dyspnea  - Continue PTA albuterol inhaler every 4 hours as needed  - Continue PTA Singulair 10 mg nightly  - Would benefit from Pulm consult outpatient with recurrent Bronchiectasis    #Hx of pulmonary embolism  Patient had V/Q scan on 5/20/2024 that showed PE.  This appeared to be unprovoked and therefore the patient was started on lifelong anticoagulation with warfarin.  He follows with anticoagulation clinic outpatient.  - Warfarin, Pharmacy dosed  - PT/INR daily    FEN: Regular diet  PPX: Warfarin   Code Status: Full CODE  Dispo: Discharge pending abx plan    Pt was discussed and evaluated with Dr. Xiomara Carrera MD, attending physician, who agrees with the assessment and plan above.     Karthik Seaman  Internal Medicine Resident (PGY1)    Review Of Systems  A 10-point ROS was negative aside from those listed above.    OBJECTIVE FINDINGS:  Temp:  [97.7  F (36.5  C)-98.1  F (36.7  C)] 98.1  F (36.7  C)  Pulse:  [116-121] 121  Resp:  [17-20] 18  BP: (91-99)/(63-76) 99/63  SpO2:  [94 %-99 %] 94 %    Gen: AA&Ox3, no acute distress  HEENT:AT/ NC, PERRL b/l, EOM grossly intact, mucous membranes pink, moist without plaque or exudate  PULM/THORAX: Good air movement throughout, CTAB   CV: Tachycardic with regular rhythm S1 and S2 appreciated, no extra  heart sounds, murmurs or rub auscultated. No JVD. No LE edema, sacral edema, or abdominal edema.   ABD: soft, nontender, nondistended  EXT: No lower extremity edema  NEURO: no focal lesion  Intake/Output Summary (Last 24 hours) at 8/24/2024 1755  Last data filed at 8/24/2024 1300  Gross per 24 hour   Intake 486 ml   Output 2050 ml   Net -1564 ml     Wt Readings from Last 5 Encounters:   08/27/24 82.3 kg (181 lb 6.4 oz)   08/23/24 89.4 kg (197 lb)   08/19/24 88.3 kg (194 lb 9.6 oz)   08/12/24 86.2 kg (190 lb)   07/12/24 92.1 kg (203 lb)     All others:  Current Facility-Administered Medications   Medication Dose Route Frequency Provider Last Rate Last Admin    acetaminophen (TYLENOL) tablet 650 mg  650 mg Oral Q4H PRN Martínez Locke MD   650 mg at 08/26/24 2030    Or    acetaminophen (TYLENOL) Suppository 650 mg  650 mg Rectal Q4H PRN Martínez Locke MD        albuterol (PROVENTIL HFA/VENTOLIN HFA) inhaler  1 puff Inhalation Q4H PRN Martínez Locke MD   1 puff at 08/27/24 0857    allopurinol (ZYLOPRIM) tablet 100 mg  100 mg Oral Daily Xiomara Carrera MD   100 mg at 08/27/24 0854    bumetanide (BUMEX) tablet 3 mg  3 mg Oral BID Lorenza Chavez MD   3 mg at 08/27/24 0853    buPROPion (WELLBUTRIN XL) 24 hr tablet 300 mg  300 mg Oral QAM Martínez Locke MD   300 mg at 08/27/24 0853    calcitRIOL (ROCALTROL) capsule 0.25 mcg  0.25 mcg Oral Daily Martínez Locke MD   0.25 mcg at 08/27/24 0853    [START ON 8/28/2024] calcium carbonate (TUMS) chewable tablet 1,000 mg  1,000 mg Oral Daily Manuel Lynch MD        [START ON 8/28/2024] ceFEPIme (MAXIPIME) 1 g vial to attach to  mL bag for ADULTS or NS 50 mL bag for PEDS  1 g Intravenous Q24H Karthik Seaman MD        ferrous sulfate (FEROSUL) tablet 325 mg  325 mg Oral BID w/meals Martínez Locke MD   325 mg at 08/27/24 1116    ipratropium - albuterol 0.5 mg/2.5 mg/3 mL (DUONEB) neb solution 3 mL  1 vial Nebulization 4x  Daily PRN Martínez Locke MD   3 mL at 08/26/24 2106    lidocaine (LMX4) cream   Topical Q1H PRN Martínez Barth MD        lidocaine 1 % 0.1-1 mL  0.1-1 mL Other Q1H PRN Martínez Barth MD        medication instruction   Does not apply Continuous PRN Martínez Barth MD        melatonin tablet 10 mg  10 mg Oral At Bedtime PRN Martínez Locke MD   10 mg at 08/26/24 2202    montelukast (SINGULAIR) tablet 10 mg  10 mg Oral At Bedtime Martínez Locke MD   10 mg at 08/26/24 2029    mycophenolate (GENERIC EQUIVALENT) capsule 500 mg  500 mg Oral BID Martínez Locke MD   500 mg at 08/27/24 0853    Patient is already receiving anticoagulation with heparin, enoxaparin (LOVENOX), warfarin (COUMADIN)  or other anticoagulant medication   Does not apply Continuous PRN Martínez Locke MD        polyethylene glycol (MIRALAX) Packet 17 g  17 g Oral BID PRN Martínez Locke MD        predniSONE (DELTASONE) tablet 40 mg  40 mg Oral Daily Lorenza Chavez MD   40 mg at 08/27/24 0852    rosuvastatin (CRESTOR) tablet 10 mg  10 mg Oral Daily Martínez Locke MD   10 mg at 08/27/24 0854    senna-docusate (SENOKOT-S/PERICOLACE) 8.6-50 MG per tablet 1 tablet  1 tablet Oral BID PRN Martínez Locke MD        Or    senna-docusate (SENOKOT-S/PERICOLACE) 8.6-50 MG per tablet 2 tablet  2 tablet Oral BID PRN Martínez Locke MD        sevelamer HCl (RENAGEL) tablet 400 mg  400 mg Oral TID w/meals Manuel Lynch MD        sodium chloride (PF) 0.9% PF flush 3 mL  3 mL Intracatheter Q8H Marítnez Barth MD   3 mL at 08/26/24 2031    sodium chloride (PF) 0.9% PF flush 3 mL  3 mL Intracatheter q1 min prn Martínez Barth MD        tacrolimus (GENERIC EQUIVALENT) capsule 3.5 mg  3.5 mg Oral BID Xiomara Carrera MD   3.5 mg at 08/27/24 0852    Vitamin D3 (CHOLECALCIFEROL) tablet 25 mcg  25 mcg Oral Daily Martínez Locke MD   25 mcg at 08/27/24 0854    Warfarin Dose  Required Daily - Pharmacist Managed  1 each Oral See Admin Instructions Chely Wynn MD            LABS Reviewed  IMAGES Reviewed     EXAM: CT CHEST W/O CONTRAST 8/25/2024 4:36 PM      INDICATION: concerns for pneumonia     COMPARISON: Chest radiograph 8/25/2024 at 1518 hours.     TECHNIQUE: Helical CT imaging of the chest was obtained without  contrast. Multiplanar post-processed and MIP reformats were obtained  reviewed.      FINDINGS:     LINES/TUBES: Abandoned left chest implantable cardioverter  defibrillator lead terminates in the mid SVC.     LOWER NECK: Thyroid unremarkable.     LUNG/PLEURA: No focal consolidation. Reticular and groundglass  densities in the basilar right lung with associated bronchiectasis and  bronchial wall thickening. No suspicious/worrisome pulmonary nodules.  No pneumothorax or pleural effusion. No pleural mass or calcification.     LARGE AIRWAYS: Patent tracheobronchial tree without intraluminal mass.  Bronchial wall thickening in the right lower lobe as mentioned above.     VESSELS: Normal caliber aorta and pulmonary artery.  Normal  size/configuration of the great vessels. Calcific atherosclerosis of  the aortic arch and proximal great vessels.     HEART: Post surgical changes of heart transplantation. No  cardiomegaly. No pericardial effusion. No coronary atherosclerotic  calcifications. Limited evaluation of valves secondary to lack of  contrast.      MEDIASTINUM AND BLANCA: No suspicious lymphadenopathy.      CHEST WALL: Intact sternotomy wires.     UPPER ABDOMEN: Limited evaluation of the upper abdomen due to lack of  contrast administration. Post surgical changes of Sylvester-en-Y gastric  bypass. No acute findings.     BONES: Mild thoracic spine degenerative changes. No acute or  suspicious/aggressive osseous lesions. Unremarkable soft tissues.                                                                      IMPRESSION:   1. Opacities in the right lower lobe base  with associated  bronchiectasis and bronchial wall thickening concerning for  infectious/inflammatory process.  2. Posterior changes of heart transplantation without evidence of  associated complications.  3. Post surgical changes of Sylvester-en-Y gastric bypass.     I have personally reviewed the examination and initial interpretation  and I agree with the findings.     MICHAEL FERNANDEZ MD

## 2024-08-28 PROBLEM — Z86.711 HISTORY OF PULMONARY EMBOLISM: Status: ACTIVE | Noted: 2024-01-01

## 2024-08-28 NOTE — PLAN OF CARE
Goal Outcome Evaluation:  Shift 0700 - 1930    Neuro: A&O x4. Reported numbness and tingling in extremities at baseline.  Respiratory: No SOB or accessory muscle use. Pt has a productive cough.  Cardiac: Sinus tachycardia. 2+ edema in LE.    GI: Denies nausea. Pt is passing gas. Last BM 8/25.  : Pt uses urinal at the bedside. See MAR for output.  Skin: WDL. No new deficits noted.  IVs: R PIV SL and dressing clean, dry and intact.  Pain: Reported headache, PRN tylenol effective. Bengay cream given for muscle soreness.  Mobility: Oriented to own ability. Up independently in room.    Plan: IV abx for pneumonia. RHC postponed d/t infection. Per nephrology, no HD at this time. Continue current plan of care and report changes to CARDS-2.

## 2024-08-28 NOTE — PHARMACY-ANTICOAGULATION SERVICE
Clinical Pharmacy- Warfarin Discharge Note  This patient is currently on warfarin for the treatment of DVT.  INR Goal= 2-3  Expected length of therapy 6 months.    Warfarin PTA Regimen: 2.5mg Wed, Sat and 3.75mg all other days per anticoagulation clinic 8/19/24      Anticoagulation Dose History  More data exists         Latest Ref Rng & Units 8/19/2024 8/23/2024 8/24/2024 8/25/2024 8/26/2024 8/27/2024 8/28/2024   Recent Dosing and Labs   warfarin ANTICOAGULANT (COUMADIN) 3 MG tablet - - - - - - 6 mg, $Given -   warfarin ANTICOAGULANT (COUMADIN) 3.75 mg half-tab - - - 3.75 mg, $Given - - - -   warfarin ANTICOAGULANT (COUMADIN) 4 MG tablet - - - - 4 mg, $Given - - -   warfarin ANTICOAGULANT (COUMADIN) 5 MG tablet - - - - - 5 mg, $Given - -   INR 0.85 - 1.15 1.97  1.90  1.94  1.95  1.79  1.56  1.69  1.76  2.15       Details          Multiple values from one day are sorted in reverse-chronological order             Assessment:  Patient anticoagulated on warfarin for unprovoked DVT/PE that occurred 5/2024.     Vitamin K doses administered during the last 7 days: None   FFP administered during the last 7 days: None    Recommendation:  Discharge the patient on Warfarin dose 5 mg today and then on 3.75 mg tomorrow.     The patient should have an INR checked  1 - 2 days .     Jagdish Machuca, PGY-1 Pharmacy Resident

## 2024-08-28 NOTE — PLAN OF CARE
Temp: 97.8  F (36.6  C) Temp src: Oral BP: 110/87 Pulse: 118   Resp: 18 SpO2: 99 % O2 Device: None (Room air)       Hours of care: 4160-2890    D: PMHx of CHF, CKD, prior gastric bypass, anemia, COPD, depression, NICM (s/p HM2 LVAD 6/2017), and s/p OHT 5/6/21. He was admitted 8/23/24 for acute heart failure exacerbation and WALTER on CKD.     I/A: [place] (he/him) is A/O x4. Calls appropriately, calm and cooperative. Tele in place, ST, HR 115s-120s. VSS on RA. R PIV in place SL. No new skin deficits noted. Tolerating regular diet with 1.8L FR. Up ad [place] independently, adequate UOP, no BM this shift. Appeared to sleep well overnight.     Changed:  Running:   PRN: melatonin x1     P: Continue to monitor and follow POC. Notify CARDS 2 with changes.    Goal Outcome Evaluation:    Plan of Care Reviewed With: patient  Overall Patient Progress: improving  Outcome Evaluation: VSS. RA. ST, rates in 115s-120s, team is aware. Pain controlled on current regimen.

## 2024-08-28 NOTE — PROGRESS NOTES
Nephrology Progress Note  08/28/2024           ASSESSMENT AND RECOMMENDATIONS:     Jim Willingham is a 69 year old male with PMH including CHF, CKD, prior gastric bypass, COPD, depression, NICM (s/p HM2 LVAD 6/2017) s/p OHT 5/6/21. Nephrology is consulted for CKD management.     # CKD stage V   # Cystatin C and Cr are concordant  CKD diagnosed long ago presumed from DM,HTN and cardiorenal from heart failure. Later creatinine increased following OHT and long term CNI use. Rapid progression of kidney dysfunction in the past six months from creatinine 1.58 and GFR 48 to creatinine to 4 to 4.5 and GFR < 15.Serologic work including c3, ,c4, dsDNA, ANCA, monoclonal screening were negative. 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 Renal imaging without obstruction. The reason for rapid progression to ESRD unclear. Suspect hemodynamic changes in the setting of CNI use.  He has attended the [facility] program and is interested in PD. Current creatinine pretty much at baseline however anticipate the need  for RRT soon likely in 2-3 months.   # BP/Volume  BP well controlled. Weight increased by 10 lbs last one week. CXR is equivocal for pulmonary congestion. Echo on 8/24/24 reported normal, EF 55-60% and normal RV function.   #  Anemia of renal disease: HB at goal. Low iron studies. Did not tolerate IV iron. On PTA oral iron   # BMD : Hyperphosphatemia and secondary hyperparathyroidism On PTA Calcitriol 0.25 mcg every day and TUMS 1gm tid with meal. But calcium improved so I reduced Tums to 1000 mg daily. Started Sevelamer 400 mg TID on 8/27/24.   # COPD, Suspect for superimposed pneumonia; now on Cipro.  #S/p heart transplant ; on tacrolimus and MMF      Recommendations   - Reduce Bumex to 2 mg BID and to start taking tomorrow  - Ask him to monitor his weight and BP, if BW continues to lower then he should lower Bumex to 1 mg BID and  "let us know  - He has an appointment with Fiordaliza English on 9/16/24  - Ok with Sevelamer carbonate 400 mg TID or PhosLo 667 mg TID with meal for phos binder (depending on insurance coverage); [facility] does not cover by his insurance  - He met with surgeon before regarding PD candidacy and PD is not a guarantee now he is leaning towards HD  - Rapid progression for unclear reason, kidney biopsy deferred because high risk of bleeding (on warfarin ) and high chance getting scar tissue which won't change the management.  - Continue PTA oral iron, no indication for ALOK therapy now  - Tums to 1 tab BID with meal (reduce from TID)  - Continue calcitriol 0.25 mcg po daily   - daily renal panel  - Monitor I/O and daily weight      Recommendations were communicated to primary team via note.     Manuel Lynch MD   Division of Renal Disease and Hypertension  [facility]  [employer]    Interval History :   Nursing and provider notes from last 24 hours reviewed.    Yesterday, Abx switched to Cefepime. Bumex was 3 mg BID oral, home dose. Continues to make good UP, 2.5 L and now BW is 179 Lbs, 4 Lbs below hhis presumed EDW. /60s.     Today he is feeling good, continue heparin urine output.  No fever chills lightheaded dizziness. Will be dismissed today.     Physical Exam:   I/O last 3 completed shifts:  In: 1200 [P.O.:1200]  Out: 2475 [Urine:2475]   /64 (BP Location: Right arm)   Pulse (!) 122   Temp 97.9  F (36.6  C) (Oral)   Resp 20   Ht 1.727 m (5' 8\")   Wt 81.5 kg (179 lb 9.6 oz)   SpO2 95%   BMI 27.31 kg/m       GENERAL APPEARANCE: not distress,  awake, pleasant  EYES: no scleral icterus, pupils equal  Pulmonary: Faint diffuse expiratory wheezes bilaterally,.  CV: regular rhythm, normal rate, no rub   - JVD -ve   - Edema -ve  GI: soft, nontender, normal bowel sounds  MS: no evidence of inflammation in joints, no muscle tenderness  : no christopher  SKIN: no rash, warm, dry, no cyanosis  NEURO: face " symmetric, no asterixis     Labs:   All labs reviewed by me  Electrolytes/Renal -   Recent Labs   Lab Test 08/28/24  0610 08/27/24  1752 08/27/24  0614   * 132* 132*   POTASSIUM 4.1 4.7 4.5   CHLORIDE 93* 93* 94*   CO2 21* 22 22   .0* 121.0* 117.0*   CR 5.25* 5.33* 5.20*   * 249* 205*   QAMAR 9.4 9.2 8.8   MAG 2.3 2.3 2.3   PHOS 6.6* 6.4* 6.1*       CBC -   Recent Labs   Lab Test 08/28/24  0610 08/27/24  0614 08/26/24  0513   WBC 7.4 3.9* 5.9   HGB 11.2* 10.6* 9.9*    188 161       LFTs -   Recent Labs   Lab Test 08/28/24  0610 08/27/24  1752 08/27/24  0614   ALKPHOS 87 87 91   BILITOTAL 0.5 0.4 0.4   ALT 8 9 9   AST 14 14 13   PROTTOTAL 7.2 6.9 6.5   ALBUMIN 4.3 4.1 4.0       Iron Panel -   Recent Labs   Lab Test 08/19/24  1258 05/23/24  0511 08/05/21  0931   IRON 40* 16* 30*   IRONSAT 16 5* 13*   VITA 151 24* 98         Imaging:  All imaging studies reviewed by me.     Current Medications:  Current Facility-Administered Medications   Medication Dose Route Frequency Provider Last Rate Last Admin    allopurinol (ZYLOPRIM) tablet 100 mg  100 mg Oral Daily Xiomara Carrera MD   100 mg at 08/28/24 0836    bumetanide (BUMEX) tablet 3 mg  3 mg Oral BID Lorenza Chavez MD   3 mg at 08/28/24 0835    buPROPion (WELLBUTRIN XL) 24 hr tablet 300 mg  300 mg Oral QAM Martínez Locke MD   300 mg at 08/28/24 0836    calcitRIOL (ROCALTROL) capsule 0.25 mcg  0.25 mcg Oral Daily Martínez Locke MD   0.25 mcg at 08/28/24 0836    calcium carbonate (TUMS) chewable tablet 1,000 mg  1,000 mg Oral BID w/meals Klomanisht, Nattawat, MD   1,000 mg at 08/28/24 0835    ceFEPIme (MAXIPIME) 1 g vial to attach to  mL bag for ADULTS or NS 50 mL bag for PEDS  1 g Intravenous Q24H Karthik Seaman MD   1 g at 08/28/24 0836    ferrous sulfate (FEROSUL) tablet 325 mg  325 mg Oral BID w/meals Martínez Locke MD   325 mg at 08/28/24 0836    montelukast (SINGULAIR) tablet 10 mg  10 mg Oral At Bedtime Chucky  Martínez Allan MD   10 mg at 08/27/24 2100    mycophenolate (GENERIC EQUIVALENT) capsule 500 mg  500 mg Oral BID Martínez Locke MD   500 mg at 08/28/24 0835    predniSONE (DELTASONE) tablet 40 mg  40 mg Oral Daily Lorenza Chavez MD   40 mg at 08/28/24 0835    rosuvastatin (CRESTOR) tablet 10 mg  10 mg Oral Daily Martínez Locke MD   10 mg at 08/28/24 0836    sevelamer HCl (RENAGEL) tablet 400 mg  400 mg Oral TID w/meals Manuel Lynch MD   400 mg at 08/27/24 2059    sodium chloride (PF) 0.9% PF flush 3 mL  3 mL Intracatheter Q8H Martínez Barth MD   3 mL at 08/27/24 2100    tacrolimus (GENERIC EQUIVALENT) capsule 3.5 mg  3.5 mg Oral BID Xiomara Carrera MD   3.5 mg at 08/28/24 0834    Vitamin D3 (CHOLECALCIFEROL) tablet 25 mcg  25 mcg Oral Daily Martínez Locke MD   25 mcg at 08/28/24 0836    Warfarin Dose Required Daily - Pharmacist Managed  1 each Oral See Admin Instructions Chely Wynn MD         Current Facility-Administered Medications   Medication Dose Route Frequency Provider Last Rate Last Admin    medication instruction   Does not apply Continuous PRN Martínez Barth MD        Patient is already receiving anticoagulation with heparin, enoxaparin (LOVENOX), warfarin (COUMADIN)  or other anticoagulant medication   Does not apply Continuous PRN Martínez Locke MD Nattawat Klomjit, MD

## 2024-08-28 NOTE — DISCHARGE SUMMARY
[facility]    Cardiology Discharge Summary- Cardiology 2         Date of Admission:  8/23/2024  Date of Discharge:  8/28/2024  Discharging Provider: Xiomara Carrera MD      Discharge Diagnoses   Acute on chronic heart failure  Acute kidney injury  Community-acquired bacterial pneumonia  COPD exacerbation    For secondary diagnoses and full problems list, please see hospital course.     Follow-ups Needed After Discharge     Unresulted Labs Ordered in the Past 30 Days of this Admission       Date and Time Order Name Status Description    8/24/2024  8:52 AM Blood Culture Arm, Right Preliminary         These results will be followed up by cardiology clinic    Discharge Disposition   Discharged to home  Condition at discharge: Stable    Hospital Course   Jim Willingham is a 69-year-old male with PMH including CHF, CKD, prior gastric bypass, anemia, COPD, depression, NICM (s/p HM2 LVAD 6/2017), and s/p OHT 5/6/21. He was admitted for acute heart failure exacerbation and WALTER on CKD. Found to have bacterial pneumonia, IV abx -> oral abx, with concomitant COPD exacerbation.     # Acute CHF Exacerbation  # Immunosuppressed s/p OHT 5/6/21  There is no history of rejection. Troponin is flat, decreasing from 156 to 152. NT Pro BNP was elevated at 28,847. The EKG did not show any acute ST or T wave changes. The echo from 8/24/24 this admission is unchanged from 5/28/24.  lbs. Received bumex while inpatient. Diuretics dose decreased at discharge due to renal function.     Volume: Euvolemic. PO Bumex 2 mg BID  Continue immunosuppressant regimen: Tacrolimus (goal 4-6),  MG BID   PPx:  CAV: Aspirin 81mg daily and rosuvastatin 10mg daily.  GI: Pantoprazole 40mg daily  Osteoporosis: Calcium/vitamin D supplements    Follow-up: Post-transplant clinic on 9/26/2024, tacro level ordered for 9/2/2024    # WALTER on CKD IV/V  He follows with nephrology as an outpatient and  has attended a kidney smart program. The patient previously expressed interest in PD however it seems like that will not be an option. Creatinine on admission was 4.68, has been slowly increasing.  - No need to start dialysis for now per nephrology  - Diuretics adjusted as above  - Continue PTA calcium and vitamin D supplementation  - Will likely need RRT in 2-3 months per Neph, outpatient HD eval for fistula vs graft  - Start phoslo on discharge  - Has nephrology follow-up in 3 weeks     # Community Acquired Bacterial Pneumonia  # COPD with acute exacerbation  No hypoxemia but has had intermittent episodes of a productive cough. The patient received DuoNeb at OSH with significant improvement in symptoms. Afebrile on arrival. The respiratory panel is negative. Sputum culture grew serratia marcescens. ID consulted. Patient received ciprofloxacin -> cefepime -> cefdinir (complete on discharge).   - Complete antibiotics course as outpatient with cefdinir  - Continue PTA inhalers on discharge  - Continue PTA Singulair 10 mg nightly  - Outpatient pulm consult ordered for recurrent Bronchiectasis    # Hx of pulmonary embolism  Patient had V/Q scan on 5/20/2024 that showed PE.  This appeared to be unprovoked and therefore the patient was started on lifelong anticoagulation with warfarin.  He follows with anticoagulation clinic outpatient.  - Warfarin, Pharmacy dosed  - INR scheduled for 9/2/2024    # Hyponatremia, mild  Sodium of 134 on admission, remained stable throughout hospitalization. Likely related to renal function and diuresis.   - CMP post-discharge on 9/2/2024    Consultations This Hospital Stay   PHARMACY TO DOSE WARFARIN  NEPHROLOGY ESRD ADULT IP CONSULT  CARE MANAGEMENT / SOCIAL WORK IP CONSULT  NURSING TO CONSULT FOR VASCULAR ACCESS CARE IP CONSULT  INFECTIOUS DISEASE TRANSPLANT SOT ADULT IP CONSULT  SPEECH LANGUAGE PATH ADULT IP CONSULT  SMOKING CESSATION PROGRAM IP CONSULT    Code Status   Full Code      Discharged discussed with Dr. Carrera.     Herb Terrell MD  PGY3, Internal Medicine  Cardiology 2 Service  [facility]  ______________________________________________________________________    Physical Exam   Vital Signs: Temp: 97.9  F (36.6  C) Temp src: Oral BP: 101/64 Pulse: (!) 122   Resp: 20 SpO2: 95 % O2 Device: None (Room air)    Weight: 179 lbs 9.6 oz    Gen: No acute distress  HEENT: Normocephalic, atraumatic  CV: Tachyardia with regular rhythm, no murmurs  Pulm: CTAB, non-labored breathing on room air  Abd: Soft, non-tender to palpation  Extr: No pitting edema in bilateral lower extremities  Skin: Poor skin turgor  Neuro: No apparent focal neurological deficits       Primary Care Physician   Ricardo Gómez    Discharge Orders   No discharge procedures on file.    Significant Results and Procedures   Most Recent 3 CBC's:  Recent Labs   Lab Test 08/28/24  0610 08/27/24  0614 08/26/24  0513   WBC 7.4 3.9* 5.9   HGB 11.2* 10.6* 9.9*   MCV 92 94 95    188 161     Most Recent 3 BMP's:  Recent Labs   Lab Test 08/28/24  0610 08/27/24  1752 08/27/24  0614   * 132* 132*   POTASSIUM 4.1 4.7 4.5   CHLORIDE 93* 93* 94*   CO2 21* 22 22   .0* 121.0* 117.0*   CR 5.25* 5.33* 5.20*   ANIONGAP 19* 17* 16*   QAMAR 9.4 9.2 8.8   * 249* 205*     Most Recent 2 LFT's:  Recent Labs   Lab Test 08/28/24  0610 08/27/24  1752   AST 14 14   ALT 8 9   ALKPHOS 87 87   BILITOTAL 0.5 0.4     Most Recent 3 INR's:  Recent Labs   Lab Test 08/28/24  0611 08/27/24  0614 08/26/24  1736   INR 2.15* 1.76* 1.56*   ,   Results for orders placed or performed during the hospital encounter of 08/23/24   XR Chest Port 1 View    Narrative    EXAM: XR CHEST PORT 1 VIEW  LOCATION: [facility]  DATE: 8/23/2024    INDICATION: hypoxia and weight gain concerning for chf  COMPARISON: 9/17/2023      Impression    IMPRESSION: Multiple median  sternotomy wires. Portions of an intravenous left subclavian lead, unchanged. No focal area of air space consolidation. No pleural effusion or pneumothorax. The heart remains mildly enlarged.   XR Chest Port 1 View    Narrative    EXAM: XR CHEST PORT 1 VIEW  8/25/2024 3:30 PM      HISTORY: suspected pneumonia    COMPARISON: 8/23/2024    FINDINGS: Single view of the chest. Median sternotomy wires. Unchanged  appearance of fractured sternotomy wire. Retained pacemaker lead.  Trachea is midline. Stable cardiac silhouette. Blunted appearance of  the right costophrenic sulcus, which appears similar to prior exams.  Streaky perihilar and bibasilar opacities. No discernible  pneumothorax. No substantial pleural effusion.      Impression    IMPRESSION: Streaky perihilar and bibasilar opacities, which is  favored to represent atelectasis. No focal consolidation.    I have personally reviewed the examination and initial interpretation  and I agree with the findings.    MICHAEL FERNANDEZ MD         SYSTEM ID:  [id number]   CT Chest w/o Contrast    Narrative    EXAM: CT CHEST W/O CONTRAST 8/25/2024 4:36 PM     INDICATION: concerns for pneumonia    COMPARISON: Chest radiograph 8/25/2024 at 1518 hours.    TECHNIQUE: Helical CT imaging of the chest was obtained without  contrast. Multiplanar post-processed and MIP reformats were obtained  reviewed.     FINDINGS:    LINES/TUBES: Abandoned left chest implantable cardioverter  defibrillator lead terminates in the mid SVC.    LOWER NECK: Thyroid unremarkable.    LUNG/PLEURA: No focal consolidation. Reticular and groundglass  densities in the basilar right lung with associated bronchiectasis and  bronchial wall thickening. No suspicious/worrisome pulmonary nodules.  No pneumothorax or pleural effusion. No pleural mass or calcification.    LARGE AIRWAYS: Patent tracheobronchial tree without intraluminal mass.  Bronchial wall thickening in the right lower lobe as mentioned above.    VESSELS:  Normal caliber aorta and pulmonary artery.  Normal  size/configuration of the great vessels. Calcific atherosclerosis of  the aortic arch and proximal great vessels.    HEART: Post surgical changes of heart transplantation. No  cardiomegaly. No pericardial effusion. No coronary atherosclerotic  calcifications. Limited evaluation of valves secondary to lack of  contrast.     MEDIASTINUM AND BLANCA: No suspicious lymphadenopathy.     CHEST WALL: Intact sternotomy wires.    UPPER ABDOMEN: Limited evaluation of the upper abdomen due to lack of  contrast administration. Post surgical changes of Sylvester-en-Y gastric  bypass. No acute findings.    BONES: Mild thoracic spine degenerative changes. No acute or  suspicious/aggressive osseous lesions. Unremarkable soft tissues.      Impression    IMPRESSION:   1. Opacities in the right lower lobe base with associated  bronchiectasis and bronchial wall thickening concerning for  infectious/inflammatory process.  2. Posterior changes of heart transplantation without evidence of  associated complications.  3. Post surgical changes of Sylvester-en-Y gastric bypass.    I have personally reviewed the examination and initial interpretation  and I agree with the findings.    MICHAEL FERNANDEZ MD         SYSTEM ID:  [id number]   Echo Complete     Value    LVEF  55-60%    Narrative    [id number]  [id number]  [id number]^KATYA^NORA^JEFFY     [facility],[place]  Echocardiography Laboratory  [address] [place], [place] [address]     Name: LOIS FULLER  MRN: [id number]  : 1955  Study Date: 2024 07:29 AM  Age: 69 yrs  Gender: Male  Patient Location: [facility]  Reason For Study: Transplant - Heart  Ordering Physician: NORA ALDANA  Performed By: Nell Jennings RDCS     BSA: 2.0 m2  Height: 68 in  Weight: 197 lb  BP: 108/89 mmHg  ______________________________________________________________________________  Procedure  Echocardiogram with  two-dimensional, color and spectral Doppler performed.  Technically difficult study. Poor acoustic windows.  ______________________________________________________________________________  Interpretation Summary  Global and regional left ventricular function is normal with an EF of 55-60%.  Global right ventricular function is normal.  No significant valvular abnormalities were noted.  No pericardial effusion is present.  This study was compared with the study from 5/28/24: No significant changes  noted.  ______________________________________________________________________________  Left Ventricle  Left ventricular size is normal. Left ventricular wall thickness is normal.  Global and regional left ventricular function is normal with an EF of 55-60%.  Diastolic function not assessed due to heart transplant. Abnormal non-specific  septal motion is present.     Right Ventricle  The right ventricle is normal size. Global right ventricular function is  normal.     Atria  The right atria appears normal. The left atrium is enlarged due to cardiac  transplantation.     Mitral Valve  The mitral valve is normal. Trace mitral insufficiency is present.     Aortic Valve  Aortic valve is normal in structure and function. The aortic valve is  tricuspid.     Tricuspid Valve  The tricuspid valve is normal. Trace tricuspid insufficiency is present. The  peak velocity of the tricuspid regurgitant jet is not obtainable. Pulmonary  artery systolic pressure cannot be assessed.     Pulmonic Valve  The valve leaflets are not well visualized. On Doppler interrogation, there is  no significant stenosis or regurgitation.     Vessels  The aorta root is normal. The pulmonary artery cannot be assessed. Dilation of  the inferior vena cava is present with abnormal respiratory variation in  diameter. IVC diameter >2.1 cm collapsing <50% with sniff suggests a high RA  pressure estimated at 15 mmHg or greater.     Pericardium  No pericardial  effusion is present.     Compared to Previous Study  This study was compared with the study from 24 . No significant changes  noted.  ______________________________________________________________________________  MMode/2D Measurements & Calculations  IVSd: 1.0 cm  LVIDd: 4.7 cm  LVIDs: 3.4 cm  LVPWd: 0.77 cm  FS: 28.1 %  LV mass(C)d: 142.1 grams  LV mass(C)dI: 70.0 grams/m2  Ao root diam: 3.5 cm  asc Aorta Diam: 3.1 cm  LVOT diam: 2.4 cm  LVOT area: 4.6 cm2  Ao root diam index Ht(cm/m): 2.0  Ao root diam index BSA (cm/m2): 1.7  Asc Ao diam index BSA (cm/m2): 1.5  Asc Ao diam index Ht(cm/m): 1.8  LA Volume (BP): 50.3 ml     LA Volume Index (BP): 24.8 ml/m2  RV Base: 3.0 cm  RWT: 0.33     Doppler Measurements & Calculations  MV E max sloane: 99.4 cm/sec  MV max P.8 mmHg  MV mean P.8 mmHg  MV V2 VTI: 21.4 cm  MV dec time: 0.12 sec  E/E' av.5  Lateral E/e': 9.9  Medial E/e': 15.1     ______________________________________________________________________________  Report approved by: Shayne Preston 2024 12:07 PM           *Note: Due to a large number of results and/or encounters for the requested time period, some results have not been displayed. A complete set of results can be found in Results Review.       Discharge Medications   Current Discharge Medication List        CONTINUE these medications which have NOT CHANGED    Details   acetaminophen (TYLENOL) 325 MG tablet Take 3 tablets (975 mg) by mouth every 8 hours as needed for mild pain  Qty: 100 tablet, Refills: 0    Associated Diagnoses: Small bowel obstruction (H)      albuterol (PROAIR HFA/PROVENTIL HFA/VENTOLIN HFA) 108 (90 Base) MCG/ACT inhaler INSTILL 2 INHALATIONS BY MOUTH  EVERY 4 HOURS AS NEEDED FOR  SHORTNESS OF BREATH`, WHEEZING  OR COUGH  Qty: 40.2 g, Refills: 0    Comments: Pharmacy may dispense brand covered by insurance (Proair, or proventil or ventolin or generic albuterol inhaler)  Associated Diagnoses: Chronic  obstructive pulmonary disease, unspecified COPD type (H)      allopurinol (ZYLOPRIM) 300 MG tablet TAKE 1 TABLET BY MOUTH DAILY  Qty: 100 tablet, Refills: 3    Comments: Please send a replace/new response with 100-Day Supply if appropriate to maximize member benefit. Requesting 1 year supply.  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 Requesting 1 year supply.  Associated Diagnoses: Major depression, recurrent, chronic (H24)      calcitRIOL (ROCALTROL) 0.25 MCG capsule Take 1 capsule (0.25 mcg) by mouth daily  Qty: 90 capsule, Refills: 3    Associated Diagnoses: Secondary hyperparathyroidism (H24)      calcium carbonate (TUMS) 500 MG chewable tablet Take 2 tablets (1,000 mg) by mouth 3 times daily (with meals)  Qty: 300 tablet, Refills: 3    Associated Diagnoses: Oxalate nephropathy      cyanocobalamin (VITAMIN B-12) 1000 MCG tablet Take 1 tablet (1,000 mcg) by mouth daily  Qty: 90 tablet, Refills: 3    Associated Diagnoses: Vitamin B12 deficiency (non anemic)      diphenhydrAMINE (BENADRYL) 25 MG tablet Take 25 mg by mouth every 6 hours as needed for itching      ferrous sulfate (FEROSUL) 325 (65 Fe) MG tablet Take 1 tablet (325 mg) by mouth 2 times daily (with meals)  Qty: 60 tablet, Refills: 6    Associated Diagnoses: Iron deficiency anemia, unspecified iron deficiency anemia type      gabapentin (NEURONTIN) 300 MG capsule Take 300 mg by mouth daily      ipratropium - albuterol 0.5 mg/2.5 mg/3 mL (DUONEB) 0.5-2.5 (3) MG/3ML neb solution Take 1 vial by  nebulization 4 times daily as needed for shortness of breath, wheezing or cough      Melatonin 10 MG CAPS Take 1 capsule by mouth nightly as needed      montelukast (SINGULAIR) 10 MG tablet TAKE 1 TABLET BY MOUTH AT  BEDTIME  Qty: 100 tablet, Refills: 3    Comments: Please send a replace/new response with 100-Day Supply if appropriate to maximize member benefit. Requesting 1 year supply.  Associated Diagnoses: S/P orthotopic heart transplant (H)      mycophenolate (GENERIC EQUIVALENT) 250 MG capsule Take 2 capsules (500 mg) by mouth 2 times daily  Qty: 180 capsule, Refills: 3    Associated Diagnoses: S/P orthotopic heart transplant (H)      polyethylene glycol (MIRALAX) 17 GM/Dose powder Take 1 Capful by mouth daily as needed for constipation      rosuvastatin (CRESTOR) 10 MG tablet TAKE 1 TABLET BY MOUTH DAILY  Qty: 90 tablet, Refills: 1    Comments: Please send a replace/new response with 100-Day Supply if appropriate to maximize member benefit. Requesting 1 year supply.  Associated Diagnoses: Hyperlipidemia LDL goal <70      !! tacrolimus (GENERIC EQUIVALENT) 0.5 MG capsule Take 1 capsule (0.5 mg) by mouth every morning With 3 mg capsules  Qty: 90 capsule, Refills: 3    Comments: TXP DT 5/6/2021 (Heart) TXP Dischg DT 5/31/2021 DX Heart replaced by transplant Z94.1 [facility] ([place], [place])  Associated Diagnoses: S/P orthotopic heart transplant (H)      !! tacrolimus (GENERIC EQUIVALENT) 1 MG capsule TAKE 3 CAPSULES BY MOUTh IN am and in pm.  Qty: 180 capsule, Refills: 3    Comments: TXP DT 5/6/2021 (Heart) TXP Dischg DT   DX Heart replaced by transplant Z94.1 [facility] ([place], [place])  Associated Diagnoses: S/P orthotopic heart transplant (H)      traMADol (ULTRAM) 50 MG tablet TAKE ONE TABLET BY MOUTH EVERY MORNING AND AFTERNOON AND 2 AT BEDTIME AS NEEDED FOR PAIN    Associated Diagnoses: S/P orthotopic heart  transplant (H)      Vitamin D, Cholecalciferol, 25 MCG (1000 UT) CAPS Take 25 mcg by mouth daily  Qty: 90 capsule, Refills: 3    Associated Diagnoses: Vitamin D deficiency      warfarin ANTICOAGULANT (COUMADIN) 2.5 MG tablet Take 2.5 mg daily or as directed by anticoagulation clinic  Qty: 30 tablet, Refills: 3    Associated Diagnoses: Single subsegmental pulmonary embolism without acute cor pulmonale (H)       !! - Potential duplicate medications found. Please discuss with provider.        Allergies   Allergies   Allergen Reactions    Grass Shortness Of Breath    Ace Inhibitors Cough    Cats     Dust Mites Other (See Comments)     Asthma    Mold Other (See Comments)     Asthma    Penicillins Other (See Comments)     Unknown - childhood exposure    Tolerated Zosyn 9/18-9/20/2020    Per patient report he has tolerated amoxicillin    Sulfa Antibiotics Other (See Comments) and Unknown     Unknown childhood reaction.  Tolerated Bactrim 2021

## 2024-08-28 NOTE — PROGRESS NOTES
Formerly [facility] [address] [address]  Phone: [phone]    Patient:  Jim Willingham, Date of birth 1955  MRN: [id number]  8/23/2024  Date of Visit:  08/28/2024  Referring Provider Xiomara Carrera      Assessment & Plan    Recommendations:   Transition from cefepime to cefdinir 300mg PO q24hrs (renally adjusted) to complete 7 days of therapy (today is day #3/7) for Serratia pneumonia vs COPD exacerbation  Agree with steroid burst of prednisone 40mg x5d  Will need outpatient follow up with Pulmonology  ID will sign off at this time, please don't hesitate to contact the SOT ID team should further questions arise.        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      Afebrile (reports temperature elevates at 99F at home), VSS, no hypoxia. Small amount of RLL infiltrate on imaging. Serratia marcescens on sputum culture. Based on history of several family members with URI symptoms with later development of productive cough, suspect that patient had a viral URI and has developed a superimposed bacterial pneumonia vs COPD exacerbation. Antibiotics are appropriate treatment for either of these processes. Based on his clinical picture, PO abx are appropriate. Started on cipro after discussion with cardiology, changed to cefepime due to concerns regarding QTc on repeat EKG. Serratia R: amp, amp/sulbactam. Bactrim is being avoided due to recent decrease in renal function. Some concern for low-level risk of amp C inducible resistance as Serratia is part of the former SPACE/SPICE  organisms. Risk of this is decreased by first 3 days of treatment with cipro/cefepime, which are not affected by AmpC.        Previous ID Issues:  - SBO requiring surgical intervention (1/4/24) and large aspiration event per-op. Sputum cx 1/2024 with Hafnia alvei (R: Amp, Amp/Sulbactam, Pip/Tazo) treated with Cefepime--> levofloxacin  - Left upper lobe cavitary lesion, A.fumigatus on BAL (8/30/21). S/p voriconazole x2-3 months (finished 11/2021)  - Hx COVID pneumonia s/p monoclonal Ab and 5 days of remdesivir (8/2021)  - S.hominis (Methicillin susceptible) bacteremia (9/2020), presumed LVAD related and treated x6 weeks  - MRSE in hand joint, broth only (7/2021)- deemed contaminant with inflamed joint due to pseudogout  - RLL pneumonia with exudative pleural effusion (7/2021), effusion cultures were negative. Urine histo and blasto antigens negative, Aspergillus galactomannan negative, BD glucan intermediate. Treated with IV abx then levofloxacin. Improvement on CT chest 7/23/21.   - H.influenzae pneumonia (1/2020)  - Low-grade EBV viremia        Transplant check list:  - QTc interval:  568 msec on 8/23/24 (not corrected for RBBB)  - Bacterial prophylaxis:  none  - Pneumocystis prophylaxis:  none (s/p 6 months of ppx)  - Viral serostatus: CMV D+/R+, EBV D+/R+, HSV1+/2-, VZV +, Toxo D-/R-   - Viral prophylaxis:  none  - Fungal prophylaxis:  none  - Immunosuppression: mycophenolate, tac  - Immunization status:  Candidate for Shingrix and RSV vaccines  - Gamma globulin status:  last checked 2020  - Isolation status: good hand hygiene        Billing:   [facility]  Time Attestation:  Total duration of visit including chart review, reviewing labs and imaging, interviewing and examining the patient, documentation, and sending communication to the primary treating team, all at the same day of this encounter, is: 30 minutes.        History of Present Illness   Pertinent history obtain from: chart review and patient  Afebrile, VSS.  "Feeling well this morning and looking forward to going home. Cough has improved. Sputum production continues to decrease. Not feeling wheezy this morning.  Discussed with Cardiology.     Antibiotics:   - cefepime 8/28-present  - Cipro 8/26-8/27    Prophylaxis: none    Physical Exam     Vital signs:  /78 (BP Location: Right arm)   Pulse 119   Temp 97.9  F (36.6  C) (Oral)   Resp 18   Ht 1.727 m (5' 8\")   Wt 81.5 kg (179 lb 9.6 oz)   SpO2 94%   BMI 27.31 kg/m      GENERAL: Pleasant and interactive. Lying on left side in bed.   RESP: lungs clear to auscultation - no rales, rhonchi or wheezes  SKIN: Warm, non-diaphoretic. No suspicious lesions or rashes    Data   Laboratory data and imaging listed below was reviewed prior to this encounter.     Microbiology:    Culture   Date Value Ref Range Status   08/25/2024 2+ Serratia marcescens (A)  Final   08/25/2024 1+ Normal jaxon  Final   08/24/2024 No growth after 4 days  Preliminary   01/06/2024 3+ Normal jaxon  Final   01/06/2024 1+ Hafnia alvei (A)  Final   01/06/2024 No Growth  Final   01/06/2024 No Growth  Final   12/16/2021 No Growth  Final   12/16/2021 No Growth  Final   08/30/2021 No Actinomyces isolated  Final   08/30/2021 No Legionella species isolated  Final   08/30/2021 No Growth  Final   08/30/2021 Candida albicans (A)  Final   08/30/2021 Candida parapsilosis (A)  Final   08/30/2021 Aspergillus fumigatus (A)  Final   08/30/2021 1+ Normal jaxon  Final   08/30/2021 1+ Staphylococcus aureus (A)  Final   08/24/2021 No Growth  Final   08/24/2021 No Growth  Final   07/11/2021 No anaerobic organisms isolated  Final   07/11/2021 No Growth  Final   07/10/2021 No Growth  Final   07/10/2021 No Growth  Final   , Hematology Studies:    Recent Labs   Lab Test 08/28/24  0610 08/27/24  0614 08/26/24  0513 08/25/24  0502 08/24/24  0516 08/23/24  2324 09/04/21  1034 09/02/21  0620 09/01/21  0637 08/31/21  0958 08/31/21  0624 08/30/21  0627 08/29/21  0825 " 08/28/21  0633 08/27/21  0550   WBC 7.4 3.9* 5.9 5.9 5.9 6.6   < > 6.5 5.0  --  4.9 3.8* 3.6*   < > 2.6*   ANEU  --   --   --   --   --   --   --  3.4 2.0  --  1.7 1.8 1.7  --  1.3*   AEOS  --   --   --   --   --   --   --  0.2 0.0  --  0.2 0.2 0.1  --  0.1   HGB 11.2* 10.6* 9.9* 10.4* 10.2* 10.6*   < > 8.0* 7.8*   < > 8.3* 7.4* 7.7*   < > 7.1*   MCV 92 94 95 95 96 96   < > 90 91  --  92 90 89   < > 91    188 161 164 176 178   < > 283 288  --  275 234 253   < > 226    < > = values in this interval not displayed.    , and Metabolic Studies:   Recent Labs   Lab Test 08/28/24  0610 08/27/24  1752 08/27/24  0614 08/26/24  1736 08/26/24  0513   * 132* 132* 134* 134*   POTASSIUM 4.1 4.7 4.5 4.7 4.4   CHLORIDE 93* 93* 94* 94* 96*   CO2 21* 22 22 22 22   .0* 121.0* 117.0* 112.0* 113.0*   CR 5.25* 5.33* 5.20* 5.03* 4.97*   GFRESTIMATED 11* 11* 11* 12* 12*

## 2024-08-28 NOTE — PROGRESS NOTES
ANTICOAGULATION  MANAGEMENT: Discharge Review    Jim Willingham chart reviewed for anticoagulation continuity of care    Hospital Admission on 8/23-8/28/24 for CHF exacerbation, WALTER, dyspnea, Pneumonia    Discharge disposition: Home    Results:    Recent labs: (last 7 days)     08/23/24  2324 08/24/24  0516 08/24/24  0920 08/25/24  0658 08/26/24  0513 08/26/24  1736 08/27/24  0614 08/28/24  0611   INR 1.90* 1.95* 1.94* 1.79* 1.69* 1.56* 1.76* 2.15*     Anticoagulation inpatient management:     8/24 3.75mg  8/25 4mg  8/26 5mg  8/27 6mg    Anticoagulation discharge instructions:     Warfarin dosing: Please take 5mg of Warfarin 8/28/2024 and 3.75 mg on 8/29/24.    Bridging: No   INR goal change: No      Medication changes affecting anticoagulation:     START taking:  calcium acetate (CALPHRON)  cefdinir (OMNICEF)- End 9/2  predniSONE (DELTASONE)- End 8/30    Additional factors affecting anticoagulation: No     PLAN     Please get your INR checked on 8/30 and a blood draw to check your other labs on 9/2/24.    Recommended follow up is scheduled-has labs scheduled for 8/30 as recommended     Anticoagulation Calendar updated    TARYN MAN RN

## 2024-08-28 NOTE — PLAN OF CARE
DISCHARGE                         8/28/24   1623  ----------------------------------------------------------------------------  Discharged to: Home  Via: private transportation  Accompanied by: Family  Discharge Instructions:  regular diet with 1800 mL fluid restriction, activity as tolerated, medications, follow up appointments, when to call the MD, aftercare instructions.  Prescriptions: To be filled by [facility] in [facility]; medication list reviewed & sent with pt  Follow Up Appointments: arranged; information given  Belongings: All sent with pt  IV: d/c'd  Telemetry: d/c'd  Pt exhibits understanding of above discharge instructions; all questions answered.    Discharge Paperwork: Reviewed and sent with pt upon discharge

## 2024-08-28 NOTE — PROGRESS NOTES
Speech Language Therapy Discharge Summary    Reason for therapy discharge:    All goals and outcomes met, no further needs identified.    Progress towards therapy goal(s). See goals on Care Plan in [facility] electronic health record for goal details.  Goals met    Therapy recommendation(s):    No further therapy is recommended. Recommend regular diet and thin liquids. Please ensure pt is upright and alert for all PO intake, and takes small bites/sips at a slow rate.        [Fatigue] : fatigue [Nasal Discharge] : nasal discharge [Sore Throat] : sore throat [Postnasal Drip] : postnasal drip [Swollen Glands] : swollen glands [Negative] : Psychiatric

## 2024-08-30 NOTE — TELEPHONE ENCOUNTER
Pt called to check in. Doing well without complaints. Just got in from mowing the lawn. Pt states he is somewhat depressed but speaking with a counselor every Friday and it is helping. Trying to stay positive.     Pt requesting pulmonology consult. Orders submitted.     Pt having INR drawn today and Is requesting bmp orders as well.     Pt also due for labs on 9/3 at 9 (bmp, cbc, mg, phos, and tacro).    Pt verbalized understanding and agrees with the plan.

## 2024-08-30 NOTE — TELEPHONE ENCOUNTER
FREE DRUG APPLICATION INITIATED    Medication: MYCOPHENOLATE MOFETIL (CELLCEPT BRAND) 250 MG PO CAPS  Free Drug Program Name:  [facility]  Date Submitted: 8/30/2024  7:42 AM  Phone #: [phone]  Fax #: [phone]  Additional Information: n/a

## 2024-09-03 NOTE — PROGRESS NOTES
ANTICOAGULATION MANAGEMENT     Jim Willingham 69 year old male is on warfarin with subtherapeutic INR result. (Goal INR 2.0-3.0)    Recent labs: (last 7 days)     09/03/24  0851   INR 1.74*       ASSESSMENT     Source(s): Chart Review and Patient/Caregiver Call     Warfarin doses taken: Less warfarin taken than planned which may be affecting INR  Diet: No new diet changes identified  Medication/supplement changes: None noted  New illness, injury, or hospitalization: No  Signs or symptoms of bleeding or clotting: No  Previous result: Therapeutic last 2(+) visits  Additional findings: None       PLAN     Recommended plan for temporary change(s) affecting INR     Dosing Instructions: booster dose then continue your current warfarin dose with next INR in 2 weeks       Summary  As of 9/3/2024      Full warfarin instructions:  9/3: 5 mg; Otherwise 2.5 mg every Wed, Sat; 3.75 mg all other days   Next INR check:  9/16/2024               Telephone call with Jim who verbalizes understanding and agrees to plan  Sent [facility] message with dosing and follow up instructions    Lab visit scheduled    Education provided: Please call back if any changes to your diet, medications or how you've been taking warfarin    Plan made per [facility] anticoagulation protocol    Brendan Mclaughlin, RN  Anticoagulation Clinic  9/3/2024    _______________________________________________________________________     Anticoagulation Episode Summary       Current INR goal:  2.0-3.0   TTR:  38.3% (2.7 mo)   Target end date:  Indefinite   Send INR reminders to:  [facility]    Indications    Transplanted heart (H) [Z94.1]  Pulmonary embolism (H) [I26.99]  Single subsegmental pulmonary embolism without acute cor pulmonale (H) [I26.93]  History of pulmonary embolism [Z86.711]             Comments:               Anticoagulation Care Providers       Provider Role Specialty Phone number    Riaz Montaño MD Referring Cardiovascular Disease  [phone]    Rajani Wilder, TIM CNP Referring Cardiovascular Disease [phone]    Karthik Seaman MD Referring Internal Medicine [phone]

## 2024-09-03 NOTE — TELEPHONE ENCOUNTER
"Pt reports he is stable since home. No n/v. Does feel off balance at times. No swelling. /88. Pt confirmed that he is taking Calcium Acetate.    Magnesium 1.7 - 2.3 mg/dL 2.7 (H)   Phosphorus 2.5 - 4.5 mg/dL 8.2 (H)     Creatinine 0.67 - 1.17 mg/dL 5.93 (H)   GFR Estimate >60 mL/min/1.73m2 10 (L)     States he thought about calling the kidney team and telling them he thinks it is time to \"pull the trigger\"  for dialysis.  But now his wife is in the hospital and he is the sole person taking care of their 10-yr-old daughter. Bus at 7:15 AM, home at 4:30 PM; doing laundry and getting dinner ready.     Support offered. Enc to reach out should he develop any further symptoms of RF.     Tac level pending.   "

## 2024-09-04 NOTE — PROGRESS NOTES
"Received VM from Jim. He thinks it may be time to start dialysis. He is feeling more weak and off balance, appetite is fine. He just isn't feeling \"right\".  We talked about PD vs HD as that's what was discussed last. He said he has decided against PD as he talked to more people (when hospitalized) and he thinks with his abdominal surgeries, it just isn't going to be a possibility. He would like HD, in-center. Writer spoke to him about starting with a catheter first as it will take a few months to get the fistula placed and then for it to mature enough to use. We went over the dialysis unit. He would still like to stick [facility] (even though it is further away from a [facility] unit) because he wants to remain with a [facility] nephrologist.  He plans to go get labs tomorrow (hepatitis b panel and Quantiferon gold).  Will update Fiordaliza English to see if she is ok with him starting process of setting up in center HD at [facility].    "

## 2024-09-05 NOTE — TELEPHONE ENCOUNTER
Provider Call: General  Route to LPN    Reason for call: Call from [facility]  the [employer] assistance form was cut off in their fax machine  wonders if we can re- fax it to them     Call back needed? No

## 2024-09-06 NOTE — TELEPHONE ENCOUNTER
Called to called [facility] to check on status of enrollment. I was informed that they will need the medical necessity form sent over again due to  signature was cut off. I was able to send over this morning again for review

## 2024-09-06 NOTE — PROGRESS NOTES
Dialysis labs back. Started [facility] admissions referral via portal for [facility]- requested Dr. Alley Sarmiento as nephrologist. Placed IR referral for tunneled line. Will plan on starting dialysis the week of September 16th after he sees Fiordaliza in clinic on 9/16.    Neph Tracking Flowsheet Last Filled Values       Kidney Smart CKD Basics Complete 07/31/24    Kidney Smart Modality Education Complete 07/31/24    Dialysis Unit Tour Referral --  7/15/24- gave [place] # to [facility] PD unit to call for tour.    Preferred Modality Hemodialysis  9/6/2024- referral sent to [facility] admissions for [facility] location-prefer MWF, morning (not early) shift.    Final Modality Hemodialysis    Patient's Referral Dates Auto Populate Patient's Referral Dates    Specialty Care Coordination Referral - Dialysis 7/15/2024    Dietician Referral 9/14/20    MTM Referral 8/25/21    Palliative Referral 10/30/20    Home Care Referral 8/26/21    Journey Referral 6/21/24    Access Surgeon Referral Status  Referred    Dialysis Access Referral 07/15/24    Access Surgical Consult 08/12/24  Plan: PD cath vs AVF creation, Dr. Strickland. If unable to place PD cath, will proceed with AVF instead. Needs vein mapping just prior to surgery. Needs PAC.    Diaylsis Access Type CVC    Transplant Evaluation Referral 6/21/24    Transplant Status  Referred

## 2024-09-09 NOTE — PROGRESS NOTES
Jim states he is on coumadin for a blood clot they found in May. Will forward to IR to make sure they are aware of this and if it needs to be held for dialysis catheter placement on 9/20.    Per IR, no need to hold warfarin. His INR just needs to be <3 on day of procedure.

## 2024-09-09 NOTE — TELEPHONE ENCOUNTER
Dialysis Access Care Coordination: General Outreach    REASON FOR CALL:     REASON FOR CALL: Clinic Care Coordination - Follow-up (Dialysis Access - fistula surgery)                                   SITUATION/BACKROUND:   Nephrology provider: Fiordaliza Verma NP.  Nephrology RN Care Coordinator:  Delisa Mead RN.    Received message from nephrology team. Patient is ready to move forward with AV fistula creation. He has decided against PD. He will be starting outpatient hemodialysis after CVC placement on 9/20.    Neph Tracking Flowsheet Last Filled Values       Kidney Smart CKD Basics Complete 07/31/24    Kidney Smart Modality Education Complete 07/31/24    Dialysis Unit Tour Referral --  7/15/24- gave [place] # to [facility] PD unit to call for tour.    Preferred Modality Hemodialysis  9/6/2024- referral sent to [facility] for [facility] location-prefer MWF, morning (not early) shift.    Final Modality Hemodialysis    Patient's Referral Dates Auto Populate Patient's Referral Dates    Specialty Care Coordination Referral - Dialysis 7/15/2024    Dietician Referral 9/14/20    MTM Referral 8/25/21    Palliative Referral 10/30/20    Home Care Referral 8/26/21    Journey Referral 6/21/24    Access Surgeon Referral Status  Referred    Dialysis Access Referral 07/15/24    Access Surgical Consult 08/12/24  Plan: PD cath vs AVF creation, Dr. Strickland. If unable to place PD cath, will proceed with AVF instead. Needs vein mapping just prior to surgery. Needs PAC.    Diaylsis Access Type CVC    Dialysis Access Surgery 09/20/24    Dialysis Access Surgeon [facility] IR    Transplant Evaluation Referral 6/21/24    Transplant Status  Referred          Dialysis History    No dialysis history on file.       Patient is followed by Solid Organ Transplant 2/2 Kidney Transplant Referral - 6/21/2024.  Coordinator: Yolette Jimenez    ASSESSMENT:     Recent Labs      Latest Ref Rng & Units 9/3/2024 8/30/2024 8/28/2024    Neph Labs   Sodium 135 - 145 mmol/L 134  132  133    GFR Estimate >60 mL/min/1.73m2 10  10  11    Potassium 3.4 - 5.3 mmol/L 3.9  4.5  4.1    Creatinine 0.67 - 1.17 mg/dL 5.93  5.75  5.25    Magnesium 1.7 - 2.3 mg/dL 2.7   2.3    Urea Nitrogen 8.0 - 23.0 mg/dL 147.0  140.0  121.0    Hemoglobin 13.3 - 17.7 g/dL 11.8   11.2    Hemoglobin A1C 0.0 - 5.6 % 5.8           Dialysis Access Assessments:  No assessment indicated    PLAN:     Future Appts Next 180 days       Visit Type Date Time Department    RETURN NEPHROLOGY 9/16/2024  2:30 PM [facility] MEDICINE RENAL    IR CVC TUNNEL PLACMENT > 5 YRS 9/20/2024 11:00 AM [facility] INTERVENTIONAL RAD    SOT EVAL NURSE ONLY 9/30/2024  7:30 AM [facility] SOT    SOT EVAL K/P AFSANEH 9/30/2024  8:30 AM [facility] SOT    SOT EVAL K/P SURG 9/30/2024  9:30 AM [facility] SOT    SOT EVAL SOCIAL WORK 9/30/2024 10:00 AM [facility] SOT    SOT EVAL NUTRITION 9/30/2024 10:30 AM [facility] SOT    SOT EVAL CARE COORDINATOR 9/30/2024 10:45 AM [facility] SOT    XR GENERAL XRAY 9/30/2024 12:15 PM [facility] XRAY          Follow Up:    Ultrasound ordered: bilateral vein mapping.  [employer] message sent to patient with scheduling phone number.  Patient to call/[employer] message with updates.     Maryellen Wilson RN  Dialysis Access Care Coordinator  Phone: [phone]  Pool: P_Dialysis_Access_Nurse

## 2024-09-09 NOTE — PROGRESS NOTES
Spoke to IR scheduling. Jim is scheduled for 9/20. Writer asked if he could be scheduled later this week as we are wanting to get him started on dialysis soon.  They can see if they can work him in on Thursday/Friday this week. They will reach out to him.    ===========================================================================================    Spoke to Eulalia Mccabe. The confirmed that Dr. Sarmiento will be following him there.      =============================================================================================  Spoke to Jim about confirmation of dialysis chair days and time( see [employer] message to him as well). His wife is out of the hospital and doing well. He had wanted a dialysis start time which would have him home to get his granddaughter off and on the bus with with a start time of 1:45 it's not possible in afternoon. Writer did talk to them and asked them to see if they can work to make the start time early in near future. We talked about keeping dialysis catheter clean and dry.

## 2024-09-10 NOTE — TELEPHONE ENCOUNTER
Patient is scheduled to have a dialysis catheter placement on 9/13/24. IR is requesting an INR <3.  Writer calls and speaks with Jackson.  Patient will have an INR done on 9/11/24.

## 2024-09-12 NOTE — PROGRESS NOTES
ANTICOAGULATION MANAGEMENT     Jim Willingham 69 year old male is on warfarin with therapeutic INR result. (Goal INR 2.0-3.0)    Recent labs: (last 7 days)     09/11/24  1100   INR 2.13*       ASSESSMENT     Warfarin Lab Questionnaire    Warfarin Doses Last 7 Days      9/10/2024     2:39 PM   Dose in Tablet or Mg   TAB or MG? milligram (mg)     Pt Rptd Dose NIDHI MONDAY TUESDAY WED THURS FRIDAY SATURDAY   9/10/2024   2:39 PM 3.75 3.75 3.75 2 3.75 2 3.75         9/10/2024   Warfarin Lab Questionnaire   Missed doses within past 14 days? No   Changes in diet or alcohol within past 14 days? No   Medication changes since last result? No   Injuries or illness since last result? No   New shortness of breath, severe headaches or sudden changes in vision since last result? No   Abnormal bleeding since last result? No   Upcoming surgery, procedure? Yes   Please explain, date scheduled? Placement of line for dialysis   Best number to call with results? [phone]        Previous result: Subtherapeutic  Additional findings: Upcoming surgery/procedure 9/13/24: dialysis catheter placement scheduled.  INR needs to be <3.0       PLAN     Recommended plan for no diet, medication or health factor changes affecting INR     Dosing Instructions: Continue your current warfarin dose with next INR in 2 days       Summary  As of 9/12/2024      Full warfarin instructions:  2.5 mg every Wed, Sat; 3.75 mg all other days   Next INR check:  9/13/2024               Detailed voice message left for Jim with dosing instructions and follow up date.   Sent [employer] message with dosing and follow up instructions    Has procedure 9/13/24    Education provided: Please call back if any changes to your diet, medications or how you've been taking warfarin  Contact [phone] with any changes, questions or concerns.     Plan made per [facility] anticoagulation protocol    Kirsty Bermudez, RN  9/12/2024  Anticoagulation Clinic  [facility] for routing  messages: fe [facility]  [facility] patient phone line: [phone]        _______________________________________________________________________     Anticoagulation Episode Summary       Current INR goal:  2.0-3.0   TTR:  37.8% (3 mo)   Target end date:  Indefinite   Send INR reminders to:  [facility]    Indications    Transplanted heart (H) [Z94.1]  Pulmonary embolism (H) [I26.99]  Single subsegmental pulmonary embolism without acute cor pulmonale (H) [I26.93]  History of pulmonary embolism [Z86.711]             Comments:               Anticoagulation Care Providers       Provider Role Specialty Phone number    Riaz Montaño MD Referring Cardiovascular Disease [phone]    Rajani Wilder, APRN CNP Referring Cardiovascular Disease [phone]    Karthik Seaman MD Referring Internal Medicine [phone]

## 2024-09-12 NOTE — TELEPHONE ENCOUNTER
Patient Contacted for the patient to call back and schedule the following:    Appointment type: US UPR EXT VENOUS MAPPING BILATERAL   Provider: Dr. Strickland (referring provider)  Return date: 09/26/24  Specialty phone number: [phone]  Additional appointment(s) needed: n/a  Additonal Notes: Writer contacted pt to schedule US, writer scheduled pt for US on 9/26/24, pt had a few questions about the US will discuss at visit w/Fiordaliza English NP on 09/16/24.  AL 9/12/24

## 2024-09-12 NOTE — TELEPHONE ENCOUNTER
Per call to [employer] at [phone] - They need to know if ins is paying. Jim filled generic on 8/25 for $15.99. Msg to Yolette to see why we are applying for brand name. If needing brand, we will have to do a PA for brand and appeal if denied.     Thank you,  Meryl Peralta Oncology/Transplant Liaison  Phone: [phone]  Fax: [phone]

## 2024-09-13 NOTE — PROGRESS NOTES
Pt arrived post procedure. VSS on RA, ex ST (baseline). Tolerating PO intake. Right upper chest site C/D/I, negative for a hematoma. One hour bedrest with HOB at 30 degrees.

## 2024-09-13 NOTE — PROCEDURES
[employer]    Procedure: IR Procedure Note    Date/Time: 9/13/2024 10:22 AM    Performed by: Louis Tejada Chi, PA-C  Authorized by: Louis Tejada Chi, PA-C  IR Fellow Physician:    Pre Procedure Diagnosis: ESRD  Post Procedure Diagnosis: same    UNIVERSAL PROTOCOL   Site Marked: NA  Prior Images Obtained and Reviewed:  Yes  Required items: Required blood products, implants, devices and special equipment available    Patient identity confirmed:  Arm band and hospital-assigned identification number  Patient was reevaluated immediately before administering moderate or deep sedation or anesthesia  Confirmation Checklist:  Patient's identity using two indicators, relevant allergies, procedure was appropriate and matched the consent or emergent situation and correct equipment/implants were available  Time out: Immediately prior to the procedure a time out was called    Universal Protocol: the Joint Commission Universal Protocol was followed    Preparation: Patient was prepped and draped in usual sterile fashion       ANESTHESIA    Anesthesia:  Local infiltration  Local Anesthetic:  Lidocaine 1% without epinephrine      SEDATION  Patient Sedated: Yes    Sedation:  Fentanyl and midazolam  Vital signs: Vital signs monitored during sedation    Findings: Procedure Start: 0955  Procedure end: 1015  Sedation medications administered: 75 mcg fentanyl, 1.5 mg midazolam       Specimens: none    Procedural Complications: None    Condition: Stable    Plan: Transport to  for recovery      PROCEDURE  Describe Procedure:  Completed placement of 14.5 Maori, 28 cm double lumen tunneled central venous catheter via right internal jugular vein with tip in RA. Aspirates and flushes freely, heparin locked and ready for immediate use.    Length of time physician/provider present for 1:1 monitoring during sedation:  0-22 min

## 2024-09-13 NOTE — PRE-PROCEDURE
GENERAL PRE-PROCEDURE:   Procedure:  Image guided tunneled central venous catheter placement    Written consent obtained?: Yes    Risks and benefits: Risks, benefits and alternatives were discussed    Consent given by:  Patient  Patient states understanding of procedure being performed: Yes    Patient's understanding of procedure matches consent: Yes    Procedure consent matches procedure scheduled: Yes    Expected level of sedation:  Moderate  Appropriately NPO:  Yes  ASA Class:  3  Mallampati  :  Grade 2- soft palate, base of uvula, tonsillar pillars, and portion of posterior pharyngeal wall visible  Lungs:  Lungs clear with good breath sounds bilaterally  Heart:  Normal heart sounds and rate  History & Physical reviewed:  History and physical reviewed and no updates needed  Statement of review:  I have reviewed the lab findings, diagnostic data, medications, and the plan for sedation

## 2024-09-13 NOTE — IR NOTE
Patient Name: Jim Willingham  Medical Record Number: [id number]  Today's Date: 9/13/2024    Procedure: Image guided tunneled central venous catheter placement  Proceduralist: MIKY Tejada PA-C  Pathology present: RIGOBERTO    Procedure Start: 0955  Procedure end: 1015  Sedation medications administered: 75 mcg fentanyl, 1.5 mg midazolam     Report given to: ANGELICA Duncan RN  : RIGOBERTO    Other Notes: Pt arrived to IR room 2 from . Consent reviewed. Pt denies any questions or concerns regarding procedure. Pt positioned supine and monitored per protocol. Pt tolerated procedure without any noted complications. Pt transferred back to . [facility] CVC heparin locked and ready for immediate use.

## 2024-09-13 NOTE — PROGRESS NOTES
ANTICOAGULATION MANAGEMENT     Jim Willingham 69 year old male is on warfarin with therapeutic INR result. (Goal INR 2.0-3.0)    Recent labs: (last 7 days)     09/13/24  0844   INR 2.31*       ASSESSMENT     Warfarin Lab Questionnaire    Warfarin Doses Last 7 Days      9/10/2024     2:39 PM   Dose in Tablet or Mg   TAB or MG? milligram (mg)     Pt Rptd Dose NIDHI MONDAY TUESDAY WED THURS FRIDAY SATURDAY   9/10/2024   2:39 PM 3.75 3.75 3.75 2 3.75 2 3.75         9/10/2024   Warfarin Lab Questionnaire   Missed doses within past 14 days? No   Changes in diet or alcohol within past 14 days? No   Medication changes since last result? No   Injuries or illness since last result? No   New shortness of breath, severe headaches or sudden changes in vision since last result? No   Abnormal bleeding since last result? No   Upcoming surgery, procedure? Yes   Please explain, date scheduled? Placement of line for dialysis   Best number to call with results? [phone]        Previous result: Therapeutic last visit; previously outside of goal range  Additional findings:  Had dialysis catheter (tunneled CVC) placed today. Had one-time dose of Ancef. Per AVS, patient is to restart Coumadin tonight. 8/28/24 started Calcium acetate and had Tums & Bumex dose changes, no expected Warfarin interactions.       PLAN     Recommended plan for no diet, medication or health factor changes affecting INR     Dosing Instructions: Continue your current warfarin dose with next INR in 1 week       Summary  As of 9/13/2024      Full warfarin instructions:  2.5 mg every Wed, Sat; 3.75 mg all other days   Next INR check:  9/20/2024               Detailed voice message left for Jim with dosing instructions and follow up date.   Sent [employer] message with dosing and follow up instructions    Contact [phone] to schedule and with any changes, questions or concerns.     Education provided: Please call back if any changes to your diet,  medications or how you've been taking warfarin  Goal range and lab monitoring: goal range and significance of current result  Symptom monitoring: monitoring for bleeding signs and symptoms  Importance of notifying anticoagulation clinic for: upcoming surgeries and procedures and health changes  Written instructions provided  Contact [phone] with any changes, questions or concerns.     Plan made per [facility] anticoagulation protocol    Viktoria Ross RN  9/13/2024  Anticoagulation Clinic  [employer] for routing messages: p [facility] patient phone line: [phone]        _______________________________________________________________________     Anticoagulation Episode Summary       Current INR goal:  2.0-3.0   TTR:  39.1% (3 mo)   Target end date:  Indefinite   Send INR reminders to:  [facility]    Indications    Transplanted heart (H) [Z94.1]  Pulmonary embolism (H) [I26.99]  Single subsegmental pulmonary embolism without acute cor pulmonale (H) [I26.93]  History of pulmonary embolism [Z86.711]             Comments:               Anticoagulation Care Providers       Provider Role Specialty Phone number    Riaz Montaño MD Referring Cardiovascular Disease [phone]    Rajani Wilder APRN CNP Referring Cardiovascular Disease [phone]    Karthik Seaman MD Referring Internal Medicine [phone]

## 2024-09-13 NOTE — PROGRESS NOTES
Pt has tolerated PO. Pt ambulates with steady gait, with stand by assist, denies dizziness/lightheadedness. Right neck and right upper chest site CDI, soft, flat, non tender. Discharge instructions reviewed with pt, pt verbalizes understanding. Plastic clamps given, with instructions, pt verbalizes understanding. PIV has been discontinued.  1140 Pt transported to [facility] via wheelchair, spouse present.

## 2024-09-13 NOTE — DISCHARGE INSTRUCTIONS
Interventional Radiology                 Discharge Instructions                       Following Tunneled Catheter Placement    Your site(s) has been closed with:     The Catheter is sutured  to skin at  insertion site    Derma Griffith (Skin Glue) on neck incision  Do not apply any ointments over site  This thin layer will slough off in 7-10 days               May gently remove Derma Griffith in 10 days if still present         Tunneled catheter is always covered with a Sterile Transparent dressing  Keep site clean and dry   Cover with an occlusive dressing for showering  Weekly transparent dressing changes or when it becomes wet or dirty     If there is any oozing or bleeding from the site, apply direct pressure for 5-10 minutes with a gauze pad.  If bleeding continues after 10 minutes call your primary doctor.  If bleeding cannot be controlled with direct pressure, call 911.    Call your Doctor if:  Bleeding as above  Swelling in your neck or arm  Sudden onset of shortness of breath, lightheadedness, or heart palpitations..  Fever greater than 100.5  F  Other signs of infection such as, redness, tenderness, or drainage from the wound.    If you were given sedation:  We recommend an adult stay with you for the first 24 hours.  No driving or alcoholic beverages for 24 hours.    ADDITIONAL INSTRUCTIONS:           If you are on Coumadin you may restart tonight.  Follow up Coumadin Clinic or Primary Care MD         To have your INR rechecked.           No heavy lifting greater than 10 lbs for 3 days.           May use ice pack for pain or minor swelling for 15 min 3-4 times per day.             *Plastic Clamps given    [facility] Interventional Radiology Department    Physician Assistant: ROGER Moreland                                      Date:September 13, 2024  Telephone Numbers:  [phone]      Monday-Friday 7:30 am to 4:00 pm  Hospital : [phone]....After hours, Weekends, & Holidays.  Ask for the  Interventional Radiologist on call.  Someone is on call 24 hours a day.   Emergency Department:  [phone]

## 2024-09-13 NOTE — TELEPHONE ENCOUNTER
Per call to [employer] at [phone], spoke with Sherri Gonzales I relayed the information that Jim just cannot afford his copay for the medication. She stated that she will update this and we will be notified when a decision has been made.    Thank you,  Meryl Peralta Oncology/Transplant Liaison  Phone: [phone]  Fax: [phone]

## 2024-09-13 NOTE — PROGRESS NOTES
Pt arrives to 2a, with spouse, for tunneled line placement. H&P is up to date. Pt is currently being consented. INR in process.

## 2024-09-16 NOTE — NURSING NOTE
Chief Complaint   Patient presents with    RECHECK     2.5 month follow up. Stated he gets headavhes and feels dizzy. Onset one month ago and has gotten bad recently. Stated his ears seemed plugged.     Vitals:    09/16/24 1415   BP: 103/66   BP Location: Right arm   Patient Position: Sitting   Cuff Size: Adult Regular   Pulse: 118   Temp: 98.1  F (36.7  C)   TempSrc: Oral   SpO2: 99%   Weight: 84.8 kg (187 lb)       BP Readings from Last 3 Encounters:   09/16/24 103/66   09/13/24 112/87   08/28/24 108/78       /66 (BP Location: Right arm, Patient Position: Sitting, Cuff Size: Adult Regular)   Pulse 118   Temp 98.1  F (36.7  C) (Oral)   Wt 84.8 kg (187 lb)   SpO2 99%   BMI 28.43 kg/m       Sunshine Heymans

## 2024-09-16 NOTE — LETTER
9/16/2024       RE: Jim Willingham  [address]  [place] [address]     Dear Colleague,    Thank you for referring your patient, Jim Willingham, to the [facility] NEPHROLOGY [facility] [place] at [facility]. Please see a copy of my visit note below.    Nephrology Clinic Visit 9/16/24    Assessment and Plan:    ESRD - Jim is uremic and ready to begin HD. 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    - He met with surgeon and PD was not guaranteed given h/o abd surgery. Given his uremic symptoms he was ready to initiate on HD. Tunneled line placed 9/13/24. Message sent to access team to get him scheduled for dialysis access surgery. Having vein mapping on 9/26/24 and will be scheduled for surgery afterward.    - Jim toured the PD training unit in case he does come out of surgery with a PD cath.    - Begins transplant eval on 9/30/24   - Will be initiating HD at [facility] under the care of Dr Sarmiento on 9/18/24. Orders have been sent    2. Heart transplant 2021 - IS regimen: TAC/MMF. TAC level 5.1. Weight 187# at home this morning. No edema/dyspnea/b/ps teens/  Current regimen: Bumex 2 mg bid   - Per Cardiology, Dr Montgomery    3. Pulmonary embolism dx'd 5/24- On Warfarin but should be completed soon.     4. Electrolytes - No acute concerns. K 4.4 Na 135    5. Acid base - No acute concerns. Bicarb 19   - Will be corrected on HD    6. Elevated oxalate level in setting of WALTER and h/o AMENA ( 2003) - Serum oxalate level 8.6 ( 5/24).    - Continue Tums  1000 mg w/meals   - Repeat oxalate level in 3 months ( 9/24)    7. Anemia - Hgb 10.1   - Iron studies 8/24 Improved: Ferritin 151 Fe 40 IS 16   - Did not tolerate IV iron   - Had colonoscopy 4/24 w/tubular polyp   - On oral iron bid and B12   - Does not meet criteria for ALOK    8. BMD - Ca 8.8 Phos 5.8 albumin 4.1   Vit D 38 ( 5/24)    ( 7/24)   On Vit D 25 mcg every day but will discontinue now that he is starting HD   Began Calcitriol 0.25 mcg every day ( 7/24)    9. Gout - Controlled on Allopurinol.  Last Uric acid level 5.3 ( 5/24)    10. Disposition - No further General Nephrology follow up. Will be following with Dr Sarmiento in the HD unit    Assessment and plan was discussed with patient and he voiced his understanding and agreement.    Reason for Visit:  CKD5    HPI:  Mr Willingham is a 68 yo male with PMH significant for Heart transplant 2021, CKD5, MICHAEL, DM2, RGBY 2003 present today for routine CKD follow up and initiation on HD.       ROS:   [place] is ready to begin HD. He is feeling generally unwell. Had tunneled line placed on Friday.    No edema. Weight is great at 187 #   Home b/ps teens/  Denies dyspnea.   No CP  No GI concerns other than intermit loose stools  No voiding concerns other than frequency  Appetite has diminished. Finds that he is eating smaller meals  Energy level poor. 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  Tolerated Bactrim 2021       Medications:  Current Outpatient Medications   Medication Sig Dispense Refill     acetaminophen (TYLENOL) 325 MG tablet Take 3 tablets (975 mg) by mouth every 8 hours as needed for mild pain 100 tablet 0     albuterol (PROAIR HFA/PROVENTIL HFA/VENTOLIN HFA) 108 (90 Base) MCG/ACT inhaler INSTILL 2 INHALATIONS BY MOUTH  EVERY 4 HOURS AS NEEDED FOR  SHORTNESS OF BREATH`, WHEEZING  OR COUGH 40.2 g 0     allopurinol (ZYLOPRIM) 300 MG tablet TAKE 1 TABLET BY MOUTH DAILY 100 tablet 3     bumetanide (BUMEX) 1 MG tablet Take 2 tablets (2 mg) by mouth 2 times daily. Do not take 8/28/24. Restart at 2mg twice a day on 8/29/24. 540 tablet 3     buPROPion (WELLBUTRIN XL) 300 MG 24 hr tablet TAKE 1 TABLET BY MOUTH IN THE  MORNING 90 tablet 3     calcitRIOL (ROCALTROL) 0.25 MCG capsule Take 1 capsule (0.25 mcg) by mouth daily 90 capsule 3     calcium carbonate (TUMS) 500 MG chewable tablet Take 1 tablet (500 mg) by mouth 2 times daily.       cyanocobalamin (VITAMIN B-12) 1000 MCG tablet Take 1 tablet (1,000 mcg) by mouth daily 90 tablet 3     diphenhydrAMINE (BENADRYL) 25 MG tablet Take 25 mg by mouth every 6 hours as needed for itching       gabapentin (NEURONTIN) 300 MG capsule Take 300 mg by mouth daily       ipratropium - albuterol 0.5 mg/2.5 mg/3 mL (DUONEB) 0.5-2.5 (3) MG/3ML neb solution Take 1 vial by nebulization 4 times daily as needed for shortness of breath, wheezing or cough       Melatonin 10 MG CAPS Take 1 capsule by mouth nightly as needed       montelukast (SINGULAIR) 10 MG tablet TAKE 1 TABLET BY MOUTH AT  BEDTIME 100 tablet 3     mycophenolate  (GENERIC EQUIVALENT) 250 MG capsule Take 2 capsules (500 mg) by mouth 2 times daily 180 capsule 3     polyethylene glycol (MIRALAX) 17 GM/Dose powder Take 1 Capful by mouth daily as needed for constipation       rosuvastatin (CRESTOR) 10 MG tablet TAKE 1 TABLET BY MOUTH DAILY 90 tablet 1     tacrolimus (GENERIC EQUIVALENT) 0.5 MG capsule Take 1 capsule (0.5 mg) by mouth every morning With 3 mg capsules 90 capsule 3     tacrolimus (GENERIC EQUIVALENT) 1 MG capsule TAKE 3 CAPSULES BY MOUTh IN am and in pm. 180 capsule 3     warfarin ANTICOAGULANT (COUMADIN) 2.5 MG tablet Please take 5mg of Warfarin 8/28/2024 and 3.75 mg on 8/29/24. You should then follow dosing recommendations from anticoagulation clinic based on your INR on 8/30/24       No current facility-administered medications for this visit.      Vitals:  /66 (BP Location: Right arm, Patient Position: Sitting, Cuff Size: Adult Regular)   Pulse 118   Temp 98.1  F (36.7  C) (Oral)   Wt 84.8 kg (187 lb)   SpO2 99%   BMI 28.43 kg/m      Exam:  GENERAL APPEARANCE: Pleasant male in NAD   RESP: lungs clear to auscultation  CV: tachy  EDEMA: no LE edema bilaterally  ABDOMEN: soft, nondistended, nontender  MS: extremities normal - no gross deformities noted  SKIN: no visible rash  Neuro: A/O    LABS:   CMP  Recent Labs   Lab Test 09/16/24  1304 09/03/24  0851 08/30/24  1439 08/28/24  0610 07/11/21  1643 07/11/21  0710 07/10/21  2031 07/10/21  1741 07/10/21  0624 07/09/21  0632    134* 132* 133*   < > 132*  --  132* 135 136   POTASSIUM 4.4 3.9 4.5 4.1   < > 5.1   < > 5.6* 4.6 4.8   CHLORIDE 97* 90* 91* 93*   < > 104  --  103 105 108   CO2 19* 23 20* 21*   < > 23  --  24 23 23   ANIONGAP 19* 21* 21* 19*   < > 6  --  5 6 5   GLC 95 139* 214* 130*   < > 125*  --  258* 77 122*   .0* 147.0* 140.0* 121.0*   < > 49*  --  44* 46* 41*   CR 6.15* 5.93* 5.75* 5.25*   < > 2.75*  --  2.48* 2.40* 2.33*   GFRESTIMATED 9* 10* 10* 11*   < > 23*  --  26* 27* 28*    GFRESTBLACK  --   --   --   --   --  27*  --  31* 32* 33*   QAMAR 8.8 9.0 8.9 9.4   < > 7.9*  --  8.1* 7.8* 7.5*    < > = values in this interval not displayed.     Recent Labs   Lab Test 09/03/24  0851 08/28/24  0610 08/27/24  1752 08/27/24  0614   BILITOTAL 0.7 0.5 0.4 0.4   ALKPHOS 93 87 87 91   ALT 24 8 9 9   AST 44 14 14 13     CBC  Recent Labs   Lab Test 09/16/24  1304 09/03/24  0851 08/28/24  0610 08/27/24  0614 08/26/24  0513   HGB 10.1* 11.8* 11.2* 10.6* 9.9*   WBC  --  7.9 7.4 3.9* 5.9   RBC  --  3.96* 3.78* 3.60* 3.38*   HCT  --  36.1* 34.7* 33.8* 32.1*   MCV  --  91 92 94 95   MCH  --  29.8 29.6 29.4 29.3   MCHC  --  32.7 32.3 31.4* 30.8*   RDW  --  13.5 14.2 14.1 14.6   PLT  --  180 190 188 161     URINE STUDIES  Recent Labs   Lab Test 07/05/24  1546 05/21/24  1306 05/20/24  1351 03/21/24  1007   COLOR Light Yellow Light Yellow Straw Yellow   APPEARANCE Clear Clear Clear Clear   URINEGLC Negative Negative Negative Negative   URINEBILI Negative Negative Negative Negative   URINEKETONE Negative Trace* Negative Negative   SG 1.011 1.015 1.007 1.015   UBLD Negative Negative Negative Trace*   URINEPH 5.0 5.5 6.5 5.5   PROTEIN Negative Negative Negative Trace*   UROBILINOGEN  --   --   --  0.2   NITRITE Negative Negative Negative Negative   LEUKEST Negative Negative Negative Trace*   RBCU <1 <1 0 0-2   WBCU 1 <1 <1 5-10*     No lab results found.  PTH  Recent Labs   Lab Test 07/12/24  1357 03/28/23  1129   PTHI 649* 529*     IRON STUDIES  Recent Labs   Lab Test 08/19/24  1258 05/23/24  0511 08/05/21  0931   IRON 40* 16* 30*    306 240   IRONSAT 16 5* 13*   VITA 151 24* 98       Gisela English NP        Again, thank you for allowing me to participate in the care of your patient.      Sincerely,    Gisela English NP

## 2024-09-16 NOTE — PROGRESS NOTES
Faxed over orders for dialysis to [facility] admissions.     Perla Pena RN, BSN   Nephrology RN Care Coordinator   [facility]

## 2024-09-16 NOTE — TELEPHONE ENCOUNTER
FREE DRUG APPLICATION APPROVED    Medication: MYCOPHENOLATE MOFETIL (CELLCEPT BRAND) 250 MG PO CAPS  Program Name:  [facility]  Effective Date:  09/16/24  Expiration Date:  09/16/25 or until therapy is discontinued  Pharmacy Filling the Rx:  [facility]  Patient Notified:  Yes, [facility]lab already called  Additional Information:       Thank you!  Rajani [email or web address]  [facility]  Phone #  [phone]  Fax # [phone]

## 2024-09-17 NOTE — PROGRESS NOTES
Nephrology Clinic Visit 9/16/24    Assessment and Plan:    ESRD - Jim is uremic and ready to begin HD. 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    - He met with surgeon and PD was not guaranteed given h/o abd surgery. Given his uremic symptoms he was ready to initiate on HD. Tunneled line placed 9/13/24. Message sent to access team to get him scheduled for dialysis access surgery. Having vein mapping on 9/26/24 and will be scheduled for surgery afterward.    - Jim toured the PD training unit in case he does come out of surgery with a PD cath.    - Begins transplant eval on 9/30/24   - Will be initiating HD at [facility] under the care of Dr Sarmiento on 9/18/24. Orders have been sent    2. Heart transplant 2021 - IS regimen: TAC/MMF. TAC level 5.1. Weight 187# at home this morning. No edema/dyspnea/b/ps teens/  Current regimen: Bumex 2 mg bid   - Per Cardiology, Dr Montgomery    3. Pulmonary embolism dx'd 5/24- On Warfarin but should be completed soon.     4. Electrolytes - No acute concerns. K 4.4 Na 135    5. Acid base - No acute concerns. Bicarb 19   - Will be corrected on HD    6. Elevated oxalate level in setting of WALTER and h/o RGBY ( 2003) - Serum oxalate level 8.6 ( 5/24).    - Continue Tums 1000 mg w/meals   - Repeat oxalate level in 3 months ( 9/24)    7. Anemia - Hgb 10.1   - Iron studies 8/24 Improved: Ferritin 151 Fe 40 IS 16   - Did not tolerate IV iron   - Had colonoscopy 4/24 w/tubular polyp   - On oral iron bid and B12   - Does not meet criteria for ALOK    8. BMD - Ca 8.8 Phos 5.8 albumin 4.1   Vit D 38 (  5/24)    ( 7/24)   On Vit D 25 mcg every day but will discontinue now that he is starting HD   Began Calcitriol 0.25 mcg every day ( 7/24)    9. Gout - Controlled on Allopurinol.  Last Uric acid level 5.3 ( 5/24)    10. Disposition - No further General Nephrology follow up. Will be following with Dr Sarmiento in the HD unit    Assessment and plan was discussed with patient and he voiced his understanding and agreement.    Reason for Visit:  CKD5    HPI:  Mr Willingham is a 70 yo male with PMH significant for Heart transplant 2021, CKD5, MICHAEL, DM2, RGBY 2003 present today for routine CKD follow up and initiation on HD.       ROS:   Jim is ready to begin HD. He is feeling generally unwell. Had tunneled line placed on Friday.    No edema. Weight is great at 187 #   Home b/ps teens/  Denies dyspnea.   No CP  No GI concerns other than intermit loose stools  No voiding concerns other than frequency  Appetite has diminished. Finds that he is eating smaller meals  Energy level poor. 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  Tolerated Bactrim 2021       Medications:  Current Outpatient Medications   Medication Sig Dispense Refill    acetaminophen (TYLENOL) 325 MG tablet Take 3 tablets (975 mg) by mouth every 8 hours as needed for mild pain 100 tablet 0    albuterol (PROAIR HFA/PROVENTIL HFA/VENTOLIN HFA) 108 (90 Base) MCG/ACT inhaler INSTILL 2 INHALATIONS BY MOUTH  EVERY 4 HOURS AS NEEDED FOR  SHORTNESS OF BREATH`, WHEEZING  OR COUGH 40.2 g 0    allopurinol (ZYLOPRIM) 300 MG tablet TAKE 1 TABLET BY MOUTH DAILY 100 tablet 3    bumetanide (BUMEX) 1 MG tablet Take 2 tablets (2 mg) by mouth 2 times daily. Do not take 8/28/24. Restart at 2mg twice a day on 8/29/24. 540 tablet 3    buPROPion (WELLBUTRIN XL) 300 MG 24 hr tablet TAKE 1 TABLET BY MOUTH IN THE  MORNING 90 tablet 3    calcitRIOL (ROCALTROL) 0.25 MCG capsule Take 1 capsule (0.25 mcg) by mouth daily 90 capsule 3    calcium carbonate (TUMS) 500 MG chewable tablet Take 1 tablet (500 mg) by mouth 2 times daily.      cyanocobalamin (VITAMIN B-12) 1000 MCG tablet Take 1 tablet (1,000 mcg) by mouth daily 90 tablet 3    diphenhydrAMINE (BENADRYL) 25 MG tablet Take 25 mg by mouth every 6 hours as needed for itching      gabapentin (NEURONTIN) 300 MG capsule Take 300 mg by mouth daily      ipratropium - albuterol 0.5 mg/2.5 mg/3 mL (DUONEB) 0.5-2.5 (3) MG/3ML neb solution Take 1 vial by nebulization 4 times daily as needed for shortness of breath, wheezing or cough      Melatonin 10 MG CAPS Take 1 capsule by mouth nightly as needed      montelukast (SINGULAIR) 10 MG tablet TAKE 1 TABLET BY MOUTH AT  BEDTIME 100 tablet 3    mycophenolate (GENERIC EQUIVALENT) 250 MG capsule Take 2 capsules (500 mg) by mouth 2 times daily 180 capsule 3    polyethylene glycol (MIRALAX) 17 GM/Dose powder Take 1 Capful by mouth daily as needed for constipation      rosuvastatin (CRESTOR) 10 MG tablet TAKE 1 TABLET BY MOUTH DAILY 90 tablet 1    tacrolimus (GENERIC EQUIVALENT) 0.5 MG capsule Take 1  capsule (0.5 mg) by mouth every morning With 3 mg capsules 90 capsule 3    tacrolimus (GENERIC EQUIVALENT) 1 MG capsule TAKE 3 CAPSULES BY MOUTh IN am and in pm. 180 capsule 3    warfarin ANTICOAGULANT (COUMADIN) 2.5 MG tablet Please take 5mg of Warfarin 8/28/2024 and 3.75 mg on 8/29/24. You should then follow dosing recommendations from anticoagulation clinic based on your INR on 8/30/24       No current facility-administered medications for this visit.      Vitals:  /66 (BP Location: Right arm, Patient Position: Sitting, Cuff Size: Adult Regular)   Pulse 118   Temp 98.1  F (36.7  C) (Oral)   Wt 84.8 kg (187 lb)   SpO2 99%   BMI 28.43 kg/m      Exam:  GENERAL APPEARANCE: Pleasant male in NAD   RESP: lungs clear to auscultation  CV: tachy  EDEMA: no LE edema bilaterally  ABDOMEN: soft, nondistended, nontender  MS: extremities normal - no gross deformities noted  SKIN: no visible rash  Neuro: A/O    LABS:   CMP  Recent Labs   Lab Test 09/16/24  1304 09/03/24  0851 08/30/24  1439 08/28/24  0610 07/11/21  1643 07/11/21  0710 07/10/21  2031 07/10/21  1741 07/10/21  0624 07/09/21  0632    134* 132* 133*   < > 132*  --  132* 135 136   POTASSIUM 4.4 3.9 4.5 4.1   < > 5.1   < > 5.6* 4.6 4.8   CHLORIDE 97* 90* 91* 93*   < > 104  --  103 105 108   CO2 19* 23 20* 21*   < > 23  --  24 23 23   ANIONGAP 19* 21* 21* 19*   < > 6  --  5 6 5   GLC 95 139* 214* 130*   < > 125*  --  258* 77 122*   .0* 147.0* 140.0* 121.0*   < > 49*  --  44* 46* 41*   CR 6.15* 5.93* 5.75* 5.25*   < > 2.75*  --  2.48* 2.40* 2.33*   GFRESTIMATED 9* 10* 10* 11*   < > 23*  --  26* 27* 28*   GFRESTBLACK  --   --   --   --   --  27*  --  31* 32* 33*   QAMAR 8.8 9.0 8.9 9.4   < > 7.9*  --  8.1* 7.8* 7.5*    < > = values in this interval not displayed.     Recent Labs   Lab Test 09/03/24  0851 08/28/24  0610 08/27/24  1752 08/27/24  0614   BILITOTAL 0.7 0.5 0.4 0.4   ALKPHOS 93 87 87 91   ALT 24 8 9 9   AST 44 14 14 13     CBC  Recent Labs    Lab Test 09/16/24  1304 09/03/24  0851 08/28/24  0610 08/27/24  0614 08/26/24  0513   HGB 10.1* 11.8* 11.2* 10.6* 9.9*   WBC  --  7.9 7.4 3.9* 5.9   RBC  --  3.96* 3.78* 3.60* 3.38*   HCT  --  36.1* 34.7* 33.8* 32.1*   MCV  --  91 92 94 95   MCH  --  29.8 29.6 29.4 29.3   MCHC  --  32.7 32.3 31.4* 30.8*   RDW  --  13.5 14.2 14.1 14.6   PLT  --  180 190 188 161     URINE STUDIES  Recent Labs   Lab Test 07/05/24  1546 05/21/24  1306 05/20/24  1351 03/21/24  1007   COLOR Light Yellow Light Yellow Straw Yellow   APPEARANCE Clear Clear Clear Clear   URINEGLC Negative Negative Negative Negative   URINEBILI Negative Negative Negative Negative   URINEKETONE Negative Trace* Negative Negative   SG 1.011 1.015 1.007 1.015   UBLD Negative Negative Negative Trace*   URINEPH 5.0 5.5 6.5 5.5   PROTEIN Negative Negative Negative Trace*   UROBILINOGEN  --   --   --  0.2   NITRITE Negative Negative Negative Negative   LEUKEST Negative Negative Negative Trace*   RBCU <1 <1 0 0-2   WBCU 1 <1 <1 5-10*     No lab results found.  PTH  Recent Labs   Lab Test 07/12/24  1357 03/28/23  1129   PTHI 649* 529*     IRON STUDIES  Recent Labs   Lab Test 08/19/24  1258 05/23/24  0511 08/05/21  0931   IRON 40* 16* 30*    306 240   IRONSAT 16 5* 13*   VITA 151 24* 98       Gisela English, BARRIE

## 2024-09-18 NOTE — TELEPHONE ENCOUNTER
ANTICOAGULATION MANAGEMENT:  Medication Refill    Anticoagulation Summary  As of 9/13/2024      Warfarin maintenance plan:  2.5 mg (2.5 mg x 1) every Wed, Sat; 3.75 mg (2.5 mg x 1.5) all other days   Next INR check:  9/20/2024   Target end date:  Indefinite    Indications    Transplanted heart (H) [Z94.1]  Pulmonary embolism (H) [I26.99]  Single subsegmental pulmonary embolism without acute cor pulmonale (H) [I26.93]  History of pulmonary embolism [Z86.711]                 Anticoagulation Care Providers       Provider Role Specialty Phone number    Riaz Montaño MD Referring Cardiovascular Disease [phone]    Rajani Wilder APRN CNP Referring Cardiovascular Disease [phone]    Karthik Seaman MD Referring Internal Medicine [phone]            Refill Criteria    Visit with referring provider/group: Meets criteria: visit within referring provider group in the last 15 months on 6/27/24    [facility] referral last signed: 08/28/2024; within last year: Yes    Lab monitoring not exceeding 2 weeks overdue: No    Jim meets all criteria for refill. Rx instructions and quantity supplied updated to match patient's current dosing plan. Warfarin 90 day supply with 1 refill granted per [facility] protocol     Maru Alonso RN  Anticoagulation Clinic

## 2024-09-24 NOTE — TELEPHONE ENCOUNTER
ANTICOAGULATION     Jim Murray Willingham is overdue for an INR check.     Patient reports he will have his INR done on 9/26/24    Maru Alonso, RN  9/24/2024  Anticoagulation Clinic  [facility] for routing messages: fe ASIF [facility]  [facility] patient phone line: [phone]

## 2024-09-26 NOTE — PROGRESS NOTES
[facility]      September 25, 2024    history of NICM (s/p HM2 LVAD 6/2017) s/p OHT 5/6/21,     Early Transplant-history     c/b right pleural air leak (required prolonged chest tube),CAP (RLL, 6/2021), recurrent pleural effusions (s/p thoracenteses x2 6/2021 and 7/2021, exudative, repeatedly cultured negative), RLL cavitary lesions (per chest CT 7/14/21, BD glucan indeterminate, aspergillus negative, not started on antifungals), pericardial effusion (1.4cm per TTE 7/12/21, conservatively managed), low-grade EBV viremia, recurrent/persistent gout flare, transaminase elevation with questionable choledocholithiasis (conservatively managed with resolution), COVID-19 (8/2021, s/p monoclonal antibodies and remdesivir x5 days), and 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    This visit:     Jim presents today for follow-up of his heart transplant with recent hospitalizations    Over the summer he was admitted with a pulmonary embolus, he had dizziness and was found to have pauses on his cardiac monitor.  He underwent extensive cardiac testing felt not to need a pacemaker.   Unfortunately in the setting of this his kidney function is continued to worsen and he has recently been started on hemodialysis he has had now 3 sessions.  He is feeling better although his weight is up to 190 pounds from his baseline of 180 pounds still has some shortness of breath with this    Overall his tremors have improved his fog has improved and generally feeling okay since initiating dialysis    He has a good functional status and has no shortness of breath no PND orthopnea blood pressures are tolerating dialysis no chest heaviness or pressure    Just added back Bumex 3 BID     Tac 3.5 mg/3 pm     Last had SBO last year that required surgery           Rejection history:  none  DSAs:  none  Graft function: Normal  Opportunistic infections: EBV viremia which was been low-grade as well as  cavitary lung lesions positive Aspergillus status post treatment    Serostatus:  CMV D+/R+  EBV D+/R+  Toxo D-/R-      PAST MEDICAL HISTORY:    Past Medical History:   Diagnosis Date    Aspergillus pneumonia (H) 08/2021    A.fumigatus on BAL, treated with voriconazole x2-3 months    Aspiration pneumonia (H) 01/06/2024    Bariatric surgery status 2003    Benign essential hypertension 05/11/2017    Bilateral carpal tunnel syndrome 11/02/2020    BPH with obstruction/lower urinary tract symptoms 03/21/2024    Cardiomyopathy, unspecified (H) 05/08/2017    CKD (chronic kidney disease) stage 3, GFR 30-59 ml/min (H) 05/11/2017    COPD (chronic obstructive pulmonary disease) (H) 11/02/2020    Depression 05/11/2017    Diabetes mellitus (H) 1995    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    Generalized osteoarthritis 09/26/2023    Gouty arthropathy, chronic, without tophi 11/02/2020    H/O adenomatous polyp of colon 08/03/2016    2 polyps    H/O gastric bypass 05/11/2017    History of type 2 diabetes mellitus 03/28/2023    Hyperlipidemia LDL goal <70 09/27/2022    ICD (implantable cardioverter-defibrillator), biventricular, in situ 05/11/2017    IFG (impaired fasting glucose) 09/26/2023    LVAD (left ventricular assist device) present (H)     Major depression, recurrent, chronic (H24) 11/02/2020    Mild anemia 03/18/2022    NICM (nonischemic cardiomyopathy) (H)/ EF 20% 05/11/2017    ECHO: LVEDd. 7.66 cm, Restrictive pattern , Severe mitral valve regurgitation    CECILIA (obstructive sleep apnea) 05/11/2017    Paroxysmal atrial fibrillation (H) 05/11/2017    Paroxysmal VT (H) 05/11/2017    Peripheral polyneuropathy 03/28/2023    Postsurgical dumping syndrome 03/21/2024    Pulmonary cavitary lesion 11/18/2021    Rotator cuff tear arthropathy of both shoulders 11/02/2020    Type 2 diabetes mellitus with diabetic polyneuropathy (H) 03/18/2022    Uncomplicated asthma     Vitamin B12 deficiency (non anemic) 05/11/2017       CURRENT  MEDICATIONS:  Current Outpatient Medications   Medication Sig Dispense Refill    acetaminophen (TYLENOL) 325 MG tablet Take 3 tablets (975 mg) by mouth every 8 hours as needed for mild pain 100 tablet 0    albuterol (PROAIR HFA/PROVENTIL HFA/VENTOLIN HFA) 108 (90 Base) MCG/ACT inhaler INSTILL 2 INHALATIONS BY MOUTH  EVERY 4 HOURS AS NEEDED FOR  SHORTNESS OF BREATH`, WHEEZING  OR COUGH 40.2 g 0    allopurinol (ZYLOPRIM) 300 MG tablet TAKE 1 TABLET BY MOUTH DAILY 100 tablet 3    bumetanide (BUMEX) 1 MG tablet Take 2 tablets (2 mg) by mouth 2 times daily. Do not take 8/28/24. Restart at 2mg twice a day on 8/29/24. 540 tablet 3    buPROPion (WELLBUTRIN XL) 300 MG 24 hr tablet TAKE 1 TABLET BY MOUTH IN THE  MORNING 90 tablet 3    calcitRIOL (ROCALTROL) 0.25 MCG capsule Take 1 capsule (0.25 mcg) by mouth daily 90 capsule 3    calcium carbonate (TUMS) 500 MG chewable tablet Take 1 tablet (500 mg) by mouth 2 times daily.      cyanocobalamin (VITAMIN B-12) 1000 MCG tablet Take 1 tablet (1,000 mcg) by mouth daily 90 tablet 3    diphenhydrAMINE (BENADRYL) 25 MG tablet Take 25 mg by mouth every 6 hours as needed for itching      gabapentin (NEURONTIN) 300 MG capsule Take 300 mg by mouth daily      ipratropium - albuterol 0.5 mg/2.5 mg/3 mL (DUONEB) 0.5-2.5 (3) MG/3ML neb solution Take 1 vial by nebulization 4 times daily as needed for shortness of breath, wheezing or cough      Melatonin 10 MG CAPS Take 1 capsule by mouth nightly as needed      montelukast (SINGULAIR) 10 MG tablet TAKE 1 TABLET BY MOUTH AT  BEDTIME 100 tablet 3    mycophenolate (GENERIC EQUIVALENT) 250 MG capsule Take 2 capsules (500 mg) by mouth 2 times daily 180 capsule 3    polyethylene glycol (MIRALAX) 17 GM/Dose powder Take 1 Capful by mouth daily as needed for constipation      rosuvastatin (CRESTOR) 10 MG tablet TAKE 1 TABLET BY MOUTH DAILY 90 tablet 1    tacrolimus (GENERIC EQUIVALENT) 0.5 MG capsule Take 1 capsule (0.5 mg) by mouth every morning With 3  mg capsules 90 capsule 3    tacrolimus (GENERIC EQUIVALENT) 1 MG capsule TAKE 3 CAPSULES BY MOUTh IN am and in pm. 180 capsule 3    warfarin ANTICOAGULANT (COUMADIN) 2.5 MG tablet Take 1-1.5 tablets daily or as directed 135 tablet 1    warfarin ANTICOAGULANT (COUMADIN) 2.5 MG tablet Please take 5mg of Warfarin 8/28/2024 and 3.75 mg on 8/29/24. You should then follow dosing recommendations from anticoagulation clinic based on your INR on 8/30/24         Exam:  /85 (BP Location: Right arm, Patient Position: Chair, Cuff Size: Adult Regular)   Pulse 117   Wt 88.6 kg (195 lb 6.4 oz)   SpO2 100%   BMI 29.71 kg/m    General: the patient is a pleasant male overall appears well    HEENT:  R eye with some conjunctival injection .    Neck:  No adenopathy, No thyromegaly.    Cardiac: elevated to 15 cm oRRR.  Normal S1 S2 splits physiologically.  No murmur, rub click, or gallop.     Abdomen: soft, nontender, nondistended.  No organomegaly.  Extremities:  No clubbing, cyanosis, or LE edema.    Neuro: Alert & Oriented x 3, grossly non focal.  Integument: no open lesions, rashes, or jaundice.      All lab trends, imaging, recent echos personally reviewed with the patient.        Latest Reference Range & Units 09/16/24 13:04   Sodium 135 - 145 mmol/L 135   Potassium 3.4 - 5.3 mmol/L 4.4   Chloride 98 - 107 mmol/L 97 (L)   Carbon Dioxide (CO2) 22 - 29 mmol/L 19 (L)   Urea Nitrogen 8.0 - 23.0 mg/dL 108.0 (H)   Creatinine 0.67 - 1.17 mg/dL 6.15 (H)   GFR Estimate >60 mL/min/1.73m2 9 (L)   Calcium 8.8 - 10.4 mg/dL 8.8   Anion Gap 7 - 15 mmol/L 19 (H)   Phosphorus 2.5 - 4.5 mg/dL 5.8 (H)   Albumin 3.5 - 5.2 g/dL 4.1   (L): Data is abnormally low  (H): Data is abnormally high        8/24/24   Echocardiogram with two-dimensional, color and spectral Doppler performed.  Technically difficult study. Poor acoustic windows.  ______________________________________________________________________________  Interpretation Summary  Global  and regional left ventricular function is normal with an EF of 55-60%.  Global right ventricular function is normal.  No significant valvular abnormalities were noted.  No pericardial effusion is present.  This study was compared with the study from 5/28/24: No significant changes  noted.        RA 10   RV 37, 6  PA 34/18, 25  PCWP 15  Wedge sat 95%  PA sat 58.4%  Kee CO/CI 5.04/2.48  Thermo CO/CI 4.7/2.31  PVR 1.98  SVR 1253 Right sided filling pressures are mildly elevated. Left sided filling pressures are normal. Mild elevated pulmonary hypertension. Normal cardiac output level.       P[ath    -Negative for acute cellular rejection - ISHLT 2005 grade 0R (previously 0)   -No light microscopic features of antibody mediated rejection   -Immunofluorescence staining for C4d or C3d & CMV immunohistochemistry pending             (See Comment)           Component 4 mo ago   Donor Identification 05/06/2021   Organ Heart   DSA Present YES   DQB9 2079   DSA Comments  Flow Single Antigen Beads assays are intended for detection/identification of IgG anti-HLA antibodies. Mfi values may not accurately quantify donor-specific antibody levels in all instances.   DSA Test Method SA EDTA FCS          2022     Left Main   The vessel is large.      Left Anterior Descending   The vessel is large and is angiographically normal.      First Diagonal Branch   The vessel is large and is angiographically normal.      Second Diagonal Branch   The vessel is small and is angiographically normal.      Left Circumflex   The vessel is moderate in size and is angiographically normal.      First Obtuse Marginal Branch   The vessel is moderate in size and is angiographically normal.      Second Obtuse Marginal Branch   The vessel is moderate in size and is angiographically normal.      Right Coronary Artery   The vessel is moderate in size and is angiographically normal.   Ost RCA to Prox RCA lesion is 20% stenosed.      Right Posterior Descending  Artery   The vessel is large and is angiographically normal.      Right Posterior Atrioventricular Artery   The vessel is moderate in size and is angiographically normal.      First Right Posterolateral Branch   The vessel is moderate in size and is angiographically normal.          Assessment and Plan:  Mr. Jim Willingham is a 68 yr old male with a history of NICM (s/p HM2 LVAD 6/2017) s/p OHT 5/6/21     Initial post transplant course was complex as detailed above, he then did very well, now recently on HD     graft function is normal by echo , he has no history of rejection, immunknow with a goal range  -last right heart catheterization showed right atrial pressure mildly elevated wedge pressure mildly elevated but this was pre HD     He now has one DSA hat surfaced in May 2024 and was not associated with any rejection on pathology   Will repeat DSA and echo in 3 months   Will also check in on volume status and blood pressures at that time as well as we are trying to get listed for renal transplant and since he has had so many hospitalizations recently    Immunosuppression:  - MMF 500mg twice daily  - tacro, goal level 4-6.    Volume: managed with HD, still volume up (would aim for 5 more lbs down) we have added back bumex as he makes some urine     Serostatus:  - CMV D+/R+  - EBV D+/R+  - Toxo D-/R-      PPx:  - CAV:  Aspirin 81mg daily and rosuvastatin 10mg daily.  - Osteoporosis: Calcium/vitamin D supplements     Rejection history:  none  AlloMap scores:  < 35 recently  DSAs:  one low level May 2023 without path change, MMF was increased and trending       CKD--> now on HD   Still volume up    Per renal     renal transplant candidacy:  he has normal graft function by echo and only mild increase in LV filling pressures which I think were related to volume in the setting of his failing kidneys  - he has an excellent functional status and no coronary disease on last angio.  I would prefer not to repeat an angiogram  based on the fact that he might lose his ability to make urine and he may have a long wait     KALI cavitary lesion  +Aspergillus (9/2021)    S/p 2 months of voriconazole- resolved .     EBV viremia- low  level   - stable He remains asymptomatic.      Recurrent CMV - getting frequency surveillance and running MMF low        Recent SBO: recovered     PE: on anticoagulation with warfarin, may complete treatment soon - following with heme     Health care maintenance: per coordinator note     RTC 3 months   Check echo, DSAs, drug levels, BP and volume status     Annual in spring     The longitudinal plan of care for heart failure was addressed during this visit. Due to the the added complexity in care, I will continue to support Jim Willingham in the subsequent management of this this condition and with ongoing continuity to care of this condition.              CC  Patient Care Team:  Ricardo Gómez MD as PCP - General (Internal Medicine)  Elfego Hayden MD as MD (Internal Medicine)  Delisa Montgomery MD as Referring Physician (Cardiology)  Chase Qureshi MD as MD (Internal Medicine-Hematology & Oncology)  Yolette Rueda, RN as Transplant Coordinator  Akshat Parkinson [facility] as Pharmacist (Pharmacist)  Marcos Washington MD as MD (Infectious Diseases)  Lyle Sarmiento DO as MD (Nephrology)  Ricardo Gómez MD as Assigned PCP  Corey Melendrez MD as MD (Dermatology)  Delisa Montgomery MD as Referring Physician (Cardiovascular Disease)  Nba Aguirre MD as MD (Neurology)  Shelia Means NP as Nurse Practitioner (Cardiovascular Disease)  Delisa Montgomery MD as Assigned Heart and Vascular Provider  Kayy Brooks APRN CNP as Referring Physician (Internal Medicine - Pediatrics)  Dk Cain MD as MD (Urology)  Gisela English NP as Assigned Nephrology Provider  Dk Cain MD as Assigned Surgical Provider  Maryellen Wilson, RN as Specialty Care  Coordinator (Nephrology)  Neli Mason, RN as Specialty Care Coordinator (Nephrology)  Higinio Hadley APRN CNP as Nurse Practitioner (Nephrology)  Leonel Ponce MD as MD (Surgery)  Yadira Granger RD as Registered Dietitian (Dietitian, Registered)  Cogan, Jacob, MD as Assigned Cancer Care Provider  Higinio Hadley APRN CNP as Nurse Practitioner (Nephrology)  TOMMY ROE

## 2024-09-26 NOTE — NURSING NOTE
Chief Complaint   Patient presents with    Follow Up     RETURN HEART TRANSPLANT       Vitals were taken and medications reconciled.    Lester Sneed, EMT  1:31 PM

## 2024-09-26 NOTE — NURSING NOTE
"Transplant Coordinator Note     Reason for visit: Close management   Coordinator: Present     Health concerns addressed today:   1. Complicated medical course. Most recent hospitalization 8/23-8/28/24 (\"acute heart failure exacerbation and WALTER on CKD. Found to have bacterial pneumonia, IV abx -> oral abx, with concomitant COPD exacerbation.\").   2. Hx of unprovoked PE on V/Q scan (5/20/2024). Now on lifelong anticoagulation with warfarin.   3. Started on dialysis 9/18/24. Patient still making some urine. Currently on Bumex 3 mg BID.  Pt's kidney transplant evaluation testing is on 9/30/24.   4. Had US of Upper Extremity venous mapping per Dr. Strickland, today.   5. Feeling bloated. Dizziness has improved. Pt lightheaded occasionally.   6. Baseline weight: 178-185#. Current weight is 195#  7. -116. Continue to monitor. Patient to let us know if it icreases into the 130's. He may need a cardiac monitor at that point.   8. Patient took his Tacrolimus dose this morning prior to labs.     Serostatus:   - CMV D+/R+   - EBV D+/R+   - Toxo D-/R-      Last cath 5/2023.   Last echo 8/24/24 EF = 55-60%   Last RHC 5/23/24   Last HBX 5/23/24   Known low level DSAS   Immuknow 535.     Immunosuppressants:   Tacro goal 4-6. Current dose 3.5/3. Recent level 5.3.    mg bid (Aspergillus) Was 250 mg bid and increased to 500 mg bid for DSAS     Routine screenings:    Derm: UTD, upcoming appt in January.   Dental: To schedule   Colonoscopy: Last 4/18/2024, polyps repeat every 5.   Prostate: last 5.86, saw urology.   Eye: UTD  Flu: Due   Pneumonia: UTD   Covid: Due   Shingles: UTD   RSV: Pt refuses    Resulted labs reviewed with patient   Medication record reviewed and reconciled   Questions and concerns addressed   Pt verbalized an understanding of plan of care.     Patient Instructions:   1. Please continue to monitor your heart rate. If you notice that it trends toward the 130's, let your coordinator know as you may need " another cardiac monitor.   2. Please let your dialysis team know what your baseline weight is. Let us know if you want your transplant coordinator connect with them.   3. Please keep us informed regarding your results related to your  kidney transplant work up.     Next transplant clinic appointment: Please return in January of 2025 with labs prior.   Next lab draw: Dependent on pending results.   Coordinator will reach out with all pending results.     Please call transplant coordinator with any questions:   [phone]

## 2024-09-26 NOTE — PATIENT INSTRUCTIONS
Patient Instructions:   1. Please continue to monitor your heart rate. If you notice that it trends toward the 130's, let your coordinator know as you may need another cardiac monitor.   2. Please let your dialysis team know what your baseline weight is. Let us know if you want your transplant coordinator connect with them.   3. Please keep us informed regarding your results related to your  kidney transplant work up.     Next transplant clinic appointment: Please return in January of 2025 with labs prior.   Next lab draw: Dependent on pending results.   Coordinator will reach out with all pending results.     Please call transplant coordinator with any questions:   [phone]

## 2024-09-26 NOTE — PROGRESS NOTES
Jim presents today for follow-up of his heart transplant will recent hospitalizations    Over the summer he was admitted with a pulmonary embolus, he had dizziness and was found to have pauses on his cardiac monitor.  He underwent extensive cardiac testing felt to need a pacemaker.  He was treated and no cause was found.  Unfortunately in the setting of this his kidney function is continued to worsen and he has recently been started on hemodialysis he has had now 3 sessions.  He is feeling better although his weight is up to 190 pounds from his baseline of 180 pounds still has some shortness of breath with this    Overall his tremors have improved his fog has improved and generally feeling okay since initiating dialysis    He has a good functional status and has no shortness of breath no PND orthopnea blood pressures are tolerating dialysis no chest heaviness or pressure    On exam dialysis site is clean dry intact elevated to 15 cm overall appears strong and healthy he does not have significant lower extremity edema    Cardiac testing shows normal left ventricular function last right heart catheterization showed right atrial pressure mildly elevated wedge pressure mildly elevated with preserved cardiac index last coronary angiogram had mild right coronary stenosis no other significant coronary disease this was in 2022 he has 1 donor specific antibody that surfaced in May 2024 and was not associated with any rejection on pathology    With respect to renal transplant candidacy he has normal graft function by echo and only mild increase in LV filling pressures which I think were related to volume in the setting of his failing kidneys he has an excellent functional status and no coronary disease on last angio.  I would prefer not to repeat an angiogram based on the fact that he might lose his ability to make urine    We have added back 3 mg of Bumex twice a day to try to maintain his volume as he makes some  urine    I am not making any changes to his immunosuppression we did increase his CellCept to 500 twice daily with the evolution of the DSA we will recheck this now we will also recheck an echo in 3 months and repeat DSA's and check in on volume status and blood pressures while we are trying to get listed and since he has had so many hospitalizations recently

## 2024-09-26 NOTE — LETTER
9/26/2024      RE: Jim Willingham  [address]  [place] [address]       Dear Colleague,    Thank you for the opportunity to participate in the care of your patient, Jim Willingham, at the [facility] [place] at [facility]. Please see a copy of my visit note below.    ADULT HEART TRANSPLANT CLINIC      September 25, 2024    history of NICM (s/p HM2 LVAD 6/2017) s/p OHT 5/6/21,     Early Transplant-history     c/b right pleural air leak (required prolonged chest tube),CAP (RLL, 6/2021), recurrent pleural effusions (s/p thoracenteses x2 6/2021 and 7/2021, exudative, repeatedly cultured negative), RLL cavitary lesions (per chest CT 7/14/21, BD glucan indeterminate, aspergillus negative, not started on antifungals), pericardial effusion (1.4cm per TTE 7/12/21, conservatively managed), low-grade EBV viremia, recurrent/persistent gout flare, transaminase elevation with questionable choledocholithiasis (conservatively managed with resolution), COVID-19 (8/2021, s/p monoclonal antibodies and remdesivir x5 days), and 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    This visit:     Jim presents today for follow-up of his heart transplant with recent hospitalizations    Over the summer he was admitted with a pulmonary embolus, he had dizziness and was found to have pauses on his cardiac monitor.  He underwent extensive cardiac testing felt not to need a pacemaker.   Unfortunately in the setting of this his kidney function is continued to worsen and he has recently been started on hemodialysis he has had now 3 sessions.  He is feeling better although his weight is up to 190 pounds from his baseline of 180 pounds still has some shortness of breath with this    Overall his tremors have improved his fog has improved and generally feeling okay since initiating dialysis    He has a good functional status and has no  shortness of breath no PND orthopnea blood pressures are tolerating dialysis no chest heaviness or pressure    Just added back Bumex 3 BID     Tac 3.5 mg/3 pm     Last had SBO last year that required surgery           Rejection history:  none  DSAs:  none  Graft function: Normal  Opportunistic infections: EBV viremia which was been low-grade as well as cavitary lung lesions positive Aspergillus status post treatment    Serostatus:  CMV D+/R+  EBV D+/R+  Toxo D-/R-      PAST MEDICAL HISTORY:    Past Medical History:   Diagnosis Date     Aspergillus pneumonia (H) 08/2021    A.fumigatus on BAL, treated with voriconazole x2-3 months     Aspiration pneumonia (H) 01/06/2024     Bariatric surgery status 2003     Benign essential hypertension 05/11/2017     Bilateral carpal tunnel syndrome 11/02/2020     BPH with obstruction/lower urinary tract symptoms 03/21/2024     Cardiomyopathy, unspecified (H) 05/08/2017     CKD (chronic kidney disease) stage 3, GFR 30-59 ml/min (H) 05/11/2017     COPD (chronic obstructive pulmonary disease) (H) 11/02/2020     Depression 05/11/2017     Diabetes mellitus (H) 1995     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     Generalized osteoarthritis 09/26/2023     Gouty arthropathy, chronic, without tophi 11/02/2020     H/O adenomatous polyp of colon 08/03/2016    2 polyps     H/O gastric bypass 05/11/2017     History of type 2 diabetes mellitus 03/28/2023     Hyperlipidemia LDL goal <70 09/27/2022     ICD (implantable cardioverter-defibrillator), biventricular, in situ 05/11/2017     IFG (impaired fasting glucose) 09/26/2023     LVAD (left ventricular assist device) present (H)      Major depression, recurrent, chronic (H24) 11/02/2020     Mild anemia 03/18/2022     NICM (nonischemic cardiomyopathy) (H)/ EF 20% 05/11/2017    ECHO: LVEDd. 7.66 cm, Restrictive pattern , Severe mitral valve regurgitation     CECILIA (obstructive sleep apnea) 05/11/2017     Paroxysmal atrial fibrillation (H) 05/11/2017      Paroxysmal VT (H) 05/11/2017     Peripheral polyneuropathy 03/28/2023     Postsurgical dumping syndrome 03/21/2024     Pulmonary cavitary lesion 11/18/2021     Rotator cuff tear arthropathy of both shoulders 11/02/2020     Type 2 diabetes mellitus with diabetic polyneuropathy (H) 03/18/2022     Uncomplicated asthma      Vitamin B12 deficiency (non anemic) 05/11/2017       CURRENT MEDICATIONS:  Current Outpatient Medications   Medication Sig Dispense Refill     acetaminophen (TYLENOL) 325 MG tablet Take 3 tablets (975 mg) by mouth every 8 hours as needed for mild pain 100 tablet 0     albuterol (PROAIR HFA/PROVENTIL HFA/VENTOLIN HFA) 108 (90 Base) MCG/ACT inhaler INSTILL 2 INHALATIONS BY MOUTH  EVERY 4 HOURS AS NEEDED FOR  SHORTNESS OF BREATH`, WHEEZING  OR COUGH 40.2 g 0     allopurinol (ZYLOPRIM) 300 MG tablet TAKE 1 TABLET BY MOUTH DAILY 100 tablet 3     bumetanide (BUMEX) 1 MG tablet Take 2 tablets (2 mg) by mouth 2 times daily. Do not take 8/28/24. Restart at 2mg twice a day on 8/29/24. 540 tablet 3     buPROPion (WELLBUTRIN XL) 300 MG 24 hr tablet TAKE 1 TABLET BY MOUTH IN THE  MORNING 90 tablet 3     calcitRIOL (ROCALTROL) 0.25 MCG capsule Take 1 capsule (0.25 mcg) by mouth daily 90 capsule 3     calcium carbonate (TUMS) 500 MG chewable tablet Take 1 tablet (500 mg) by mouth 2 times daily.       cyanocobalamin (VITAMIN B-12) 1000 MCG tablet Take 1 tablet (1,000 mcg) by mouth daily 90 tablet 3     diphenhydrAMINE (BENADRYL) 25 MG tablet Take 25 mg by mouth every 6 hours as needed for itching       gabapentin (NEURONTIN) 300 MG capsule Take 300 mg by mouth daily       ipratropium - albuterol 0.5 mg/2.5 mg/3 mL (DUONEB) 0.5-2.5 (3) MG/3ML neb solution Take 1 vial by nebulization 4 times daily as needed for shortness of breath, wheezing or cough       Melatonin 10 MG CAPS Take 1 capsule by mouth nightly as needed       montelukast (SINGULAIR) 10 MG tablet TAKE 1 TABLET BY MOUTH AT  BEDTIME 100 tablet 3      mycophenolate (GENERIC EQUIVALENT) 250 MG capsule Take 2 capsules (500 mg) by mouth 2 times daily 180 capsule 3     polyethylene glycol (MIRALAX) 17 GM/Dose powder Take 1 Capful by mouth daily as needed for constipation       rosuvastatin (CRESTOR) 10 MG tablet TAKE 1 TABLET BY MOUTH DAILY 90 tablet 1     tacrolimus (GENERIC EQUIVALENT) 0.5 MG capsule Take 1 capsule (0.5 mg) by mouth every morning With 3 mg capsules 90 capsule 3     tacrolimus (GENERIC EQUIVALENT) 1 MG capsule TAKE 3 CAPSULES BY MOUTh IN am and in pm. 180 capsule 3     warfarin ANTICOAGULANT (COUMADIN) 2.5 MG tablet Take 1-1.5 tablets daily or as directed 135 tablet 1     warfarin ANTICOAGULANT (COUMADIN) 2.5 MG tablet Please take 5mg of Warfarin 8/28/2024 and 3.75 mg on 8/29/24. You should then follow dosing recommendations from anticoagulation clinic based on your INR on 8/30/24         Exam:  /85 (BP Location: Right arm, Patient Position: Chair, Cuff Size: Adult Regular)   Pulse 117   Wt 88.6 kg (195 lb 6.4 oz)   SpO2 100%   BMI 29.71 kg/m    General: the patient is a pleasant male overall appears well    HEENT:  R eye with some conjunctival injection .    Neck:  No adenopathy, No thyromegaly.    Cardiac: elevated to 15 cm oRRR.  Normal S1 S2 splits physiologically.  No murmur, rub click, or gallop.     Abdomen: soft, nontender, nondistended.  No organomegaly.  Extremities:  No clubbing, cyanosis, or LE edema.    Neuro: Alert & Oriented x 3, grossly non focal.  Integument: no open lesions, rashes, or jaundice.      All lab trends, imaging, recent echos personally reviewed with the patient.        Latest Reference Range & Units 09/16/24 13:04   Sodium 135 - 145 mmol/L 135   Potassium 3.4 - 5.3 mmol/L 4.4   Chloride 98 - 107 mmol/L 97 (L)   Carbon Dioxide (CO2) 22 - 29 mmol/L 19 (L)   Urea Nitrogen 8.0 - 23.0 mg/dL 108.0 (H)   Creatinine 0.67 - 1.17 mg/dL 6.15 (H)   GFR Estimate >60 mL/min/1.73m2 9 (L)   Calcium 8.8 - 10.4 mg/dL 8.8    Anion Gap 7 - 15 mmol/L 19 (H)   Phosphorus 2.5 - 4.5 mg/dL 5.8 (H)   Albumin 3.5 - 5.2 g/dL 4.1   (L): Data is abnormally low  (H): Data is abnormally high        8/24/24   Echocardiogram with two-dimensional, color and spectral Doppler performed.  Technically difficult study. Poor acoustic windows.  ______________________________________________________________________________  Interpretation Summary  Global and regional left ventricular function is normal with an EF of 55-60%.  Global right ventricular function is normal.  No significant valvular abnormalities were noted.  No pericardial effusion is present.  This study was compared with the study from 5/28/24: No significant changes  noted.        RA 10   RV 37, 6  PA 34/18, 25  PCWP 15  Wedge sat 95%  PA sat 58.4%  Kee CO/CI 5.04/2.48  Thermo CO/CI 4.7/2.31  PVR 1.98  SVR 1253 Right sided filling pressures are mildly elevated. Left sided filling pressures are normal. Mild elevated pulmonary hypertension. Normal cardiac output level.       P[ath    -Negative for acute cellular rejection - ISHLT 2005 grade 0R (previously 0)   -No light microscopic features of antibody mediated rejection   -Immunofluorescence staining for C4d or C3d & CMV immunohistochemistry pending             (See Comment)           Component 4 mo ago   Donor Identification 05/06/2021   Organ Heart   DSA Present YES   DQB9 2079   DSA Comments  Flow Single Antigen Beads assays are intended for detection/identification of IgG anti-HLA antibodies. Mfi values may not accurately quantify donor-specific antibody levels in all instances.   DSA Test Method SA EDTA FCS          2022     Left Main   The vessel is large.      Left Anterior Descending   The vessel is large and is angiographically normal.      First Diagonal Branch   The vessel is large and is angiographically normal.      Second Diagonal Branch   The vessel is small and is angiographically normal.      Left Circumflex   The vessel is  moderate in size and is angiographically normal.      First Obtuse Marginal Branch   The vessel is moderate in size and is angiographically normal.      Second Obtuse Marginal Branch   The vessel is moderate in size and is angiographically normal.      Right Coronary Artery   The vessel is moderate in size and is angiographically normal.   Ost RCA to Prox RCA lesion is 20% stenosed.      Right Posterior Descending Artery   The vessel is large and is angiographically normal.      Right Posterior Atrioventricular Artery   The vessel is moderate in size and is angiographically normal.      First Right Posterolateral Branch   The vessel is moderate in size and is angiographically normal.          Assessment and Plan:  Mr. Jim Willingham is a 68 yr old male with a history of NICM (s/p HM2 LVAD 6/2017) s/p OHT 5/6/21     Initial post transplant course was complex as detailed above, he then did very well, now recently on HD     graft function is normal by echo , he has no history of rejection, immunknow with a goal range  -last right heart catheterization showed right atrial pressure mildly elevated wedge pressure mildly elevated but this was pre HD     He now has one DSA hat surfaced in May 2024 and was not associated with any rejection on pathology   Will repeat DSA and echo in 3 months   Will also check in on volume status and blood pressures at that time as well as we are trying to get listed for renal transplant and since he has had so many hospitalizations recently    Immunosuppression:  - MMF 500mg twice daily  - tacro, goal level 4-6.    Volume: managed with HD, still volume up (would aim for 5 more lbs down) we have added back bumex as he makes some urine     Serostatus:  - CMV D+/R+  - EBV D+/R+  - Toxo D-/R-      PPx:  - CAV:  Aspirin 81mg daily and rosuvastatin 10mg daily.  - Osteoporosis: Calcium/vitamin D supplements     Rejection history:  none  AlloMap scores:  < 35 recently  DSAs:  one low level May 2023  without path change, MMF was increased and trending       CKD--> now on HD   Still volume up    Per renal     renal transplant candidacy:  he has normal graft function by echo and only mild increase in LV filling pressures which I think were related to volume in the setting of his failing kidneys  - he has an excellent functional status and no coronary disease on last angio.  I would prefer not to repeat an angiogram based on the fact that he might lose his ability to make urine and he may have a long wait     KALI cavitary lesion  +Aspergillus (9/2021)    S/p 2 months of voriconazole- resolved .     EBV viremia- low  level   - stable He remains asymptomatic.      Recurrent CMV - getting frequency surveillance and running MMF low        Recent SBO: recovered     PE: on anticoagulation with warfarin, may complete treatment soon - following with heme     Health care maintenance: per coordinator note     RTC 3 months   Check echo, DSAs, drug levels, BP and volume status     Annual in spring     The longitudinal plan of care for heart failure was addressed during this visit. Due to the the added complexity in care, I will continue to support Jim Willingham in the subsequent management of this this condition and with ongoing continuity to care of this condition.              CC  Patient Care Team:  Ricardo Gómez MD as PCP - General (Internal Medicine)  Elfego Hayden MD as MD (Internal Medicine)  Delisa Montgomery MD as Referring Physician (Cardiology)  Chase Qureshi MD as MD (Internal Medicine-Hematology & Oncology)  Yolette Rueda, RN as Transplant Coordinator  Akshat Parkinson [facility] as Pharmacist (Pharmacist)  Marcos Washington MD as MD (Infectious Diseases)  Lyle Sarmiento DO as MD (Nephrology)  Ricardo Gómez MD as Assigned PCP  Corey Melendrez MD as MD (Dermatology)  Delisa Montgomery MD as Referring Physician (Cardiovascular Disease)  Nba Aguirre MD as MD  (Neurology)  Shelia Means NP as Nurse Practitioner (Cardiovascular Disease)  Delisa Montgomery MD as Assigned Heart and Vascular Provider  Kayy Brooks APRN CNP as Referring Physician (Internal Medicine - Pediatrics)  Dk Cain MD as MD (Urology)  Gisela English NP as Assigned Nephrology Provider  Dk Cain MD as Assigned Surgical Provider  Maryellen Wilson, RN as Specialty Care Coordinator (Nephrology)  Neli Mason, RN as Specialty Care Coordinator (Nephrology)  Higinio Hadley APRN CNP as Nurse Practitioner (Nephrology)  Leonel Ponce MD as MD (Surgery)  Yadira Granger RD as Registered Dietitian (Dietitian, Registered)  Cogan, Jacob, MD as Assigned Cancer Care Provider  Higinio Hadley APRN CNP as Nurse Practitioner (Nephrology)  TOMMY ROE      Please do not hesitate to contact me if you have any questions/concerns.     Sincerely,     Delisa Montgomery MD

## 2024-09-26 NOTE — PROGRESS NOTES
ANTICOAGULATION MANAGEMENT     Jim Willingham 69 year old male is on warfarin with subtherapeutic INR result. (Goal INR 2.0-3.0)    Recent labs: (last 7 days)     09/26/24  1303   INR 1.77*       ASSESSMENT     Source(s): Chart Review and Patient/Caregiver Call     Warfarin doses taken: Warfarin taken as instructed  Diet: No new diet changes identified  Medication/supplement changes: None noted  New illness, injury, or hospitalization: No  Signs or symptoms of bleeding or clotting: No  Previous result: Therapeutic last 2(+) visits  Additional findings:  pt recently started dialysis (MWF), tomorrow is pt's fourth session        PLAN     Recommended plan for ongoing change(s) affecting INR     Dosing Instructions: booster dose then Increase your warfarin dose (5.3% change) with next INR in 4 days when other labs are done.        Summary  As of 9/26/2024      Full warfarin instructions:  9/26: 5 mg; Otherwise 2.5 mg every Wed; 3.75 mg all other days   Next INR check:  9/30/2024               Telephone call with [place] who verbalizes understanding and agrees to plan    Patient to recheck with home meter    Education provided: Please call back if any changes to your diet, medications or how you've been taking warfarin  Goal range and lab monitoring: goal range and significance of current result, Importance of therapeutic range, and Importance of following up at instructed interval  Symptom monitoring: monitoring for clotting signs and symptoms and monitoring for stroke signs and symptoms    Plan made per [facility] anticoagulation protocol    Maryellen Clemons, RN  9/26/2024  Anticoagulation Clinic  [employer] for routing messages: fe [facility]  [facility] patient phone line: [phone]        _______________________________________________________________________     Anticoagulation Episode Summary       Current INR goal:  2.0-3.0   TTR:  41.4% (3.5 mo)   Target end date:  Indefinite   Send INR reminders to:  [facility]    Indications    Transplanted heart (H) [Z94.1]  Pulmonary embolism (H) [I26.99]  Single subsegmental pulmonary embolism without acute cor pulmonale (H) [I26.93]  History of pulmonary embolism [Z86.711]             Comments:               Anticoagulation Care Providers       Provider Role Specialty Phone number    Riaz Montaño MD Referring Cardiovascular Disease [phone]    Rajani Wilder, APRN CNP Referring Cardiovascular Disease [phone]    Karthik Seaman MD Referring Internal Medicine [phone]

## 2024-09-27 NOTE — TELEPHONE ENCOUNTER
Dialysis Access Care Coordination: General Outreach    REASON FOR CALL:     REASON FOR CALL: Clinic Care Coordination - Follow-up (Dialysis Access - surgery plan)                                   SITUATION/BACKROUND:     Patient had his vein mapping completed yesterday. Called patient and discussed his surgery plan. He would prefer a forearm fistula, regardless of which arm. His vein mapping shows a larger diameter in his right arm cephalic vein, so he is agreeable to planning for a right forearm fistula. He would have one incision near his wrist, and possibly 1-2 other small incisions in the forearm if there are branches. Reviewed maturation timeline and follow up appointments.    Patient would like to avoid the week of October 9th for surgery, as his wife will be out of town, so he would not have transportation. He also asks to avoid Halloween so he can spend it with his granddaughter.    Neph Tracking Flowsheet Last Filled Values       Kidney Smart CKD Basics Complete 07/31/24    Kidney Smart Modality Education Complete 07/31/24    Dialysis Unit Tour Referral --  7/15/24- gave Jim # to  [place] [facility] unit to call for tour.    Preferred Modality Hemodialysis  9/6/2024- referral sent to [facility] admissions for [facility] location-prefer MWF, morning (not early) shift.    Final Modality Hemodialysis    Patient's Referral Dates Auto Populate Patient's Referral Dates    Specialty Care Coordination Referral - Dialysis 7/15/2024    Dietician Referral 9/14/20    MTM Referral 8/25/21    Palliative Referral 10/30/20    Home Care Referral 8/26/21    Journey Referral 6/21/24    Access Surgeon Referral Status  Referred    Dialysis Access Referral 07/15/24    Access Surgical Consult 08/12/24  Plan: PD cath vs AVF creation, Dr. Strickland. If unable to place PD cath, will proceed with AVF instead. Needs vein mapping just prior to surgery. Needs PAC.    Diaylsis Access Type CVC    Dialysis Access Surgery 09/20/24     Dialysis Access Surgeon [facility] IR    Transplant Evaluation Referral 6/21/24    Transplant Status  Referred    Dialysis Optimal Start? No    Non-optimal Start Detail HD With CVC          Dialysis History        Start End Type Center Comments    9/18/2024  [facility] DIALYSIS (ESRD) Dr. Sarmiento, MWF 1:45 pm                  Patient is followed by Solid Organ Transplant 2/2 Kidney Transplant Referral - 6/21/2024.  Coordinator: Yolette Jimenez    ASSESSMENT:     Recent Labs      Latest Ref Rng & Units 9/26/2024 9/16/2024 9/3/2024   Neph Labs   Sodium 135 - 145 mmol/L 139  135  134    GFR Estimate >60 mL/min/1.73m2 15  9  10    Potassium 3.4 - 5.3 mmol/L 4.1  4.4  3.9    Creatinine 0.67 - 1.17 mg/dL 4.22  6.15  5.93    Magnesium 1.7 - 2.3 mg/dL 2.2   2.7    Urea Nitrogen 8.0 - 23.0 mg/dL 34.6  108.0  147.0    Hemoglobin 13.3 - 17.7 g/dL  13.3 - 17.7 g/dL 9.8     9.8  10.1  11.8    Hemoglobin A1C 0.0 - 5.6 %   5.8    Ferritin 31 - 409 ng/mL 241      Iron Binding Cap 240 - 430 ug/dL 222      Iron 61 - 157 ug/dL 38          Multiple values from one day are sorted in reverse-chronological order     Dialysis Access Assessments:  No assessment indicated    PLAN:     Future Appts Next 180 days       Visit Type Date Time Department    SOT EVAL NURSE ONLY 9/30/2024  7:30 AM [facility] SOT    SOT EVAL K/P AFSANEH 9/30/2024  8:30 AM [facility] S[facility]    SOT EVAL K/P SURG 9/30/2024  9:30 AM [facility]    SOT EVAL SOCIAL WORK 9/30/2024 10:00 AM [facility] SOT    SOT EVAL NUTRITION 9/30/2024 10:30 AM [facility] SOT    SOT EVAL CARE COORDINATOR 9/30/2024 10:45 AM [facility] SOT    XR GENERAL XRAY 9/30/2024 12:15 PM [facility] XRAY            Follow Up:    Request sent to schedule right arm fistula creation with Dr. Strickland.    Patient verbalized understanding and will follow up as recommended.    Maryellen Wilson RN  Dialysis Access Care Coordinator  Phone: [phone]  Pool: P_Dialysis_Access_Nurse

## 2024-09-30 PROBLEM — Z76.82 ORGAN TRANSPLANT CANDIDATE: Status: ACTIVE | Noted: 2024-01-01

## 2024-09-30 NOTE — PROGRESS NOTES
ANTICOAGULATION MANAGEMENT     Jim Willingham 69 year old male is on warfarin with subtherapeutic INR result. (Goal INR 2.0-3.0)    Recent labs: (last 7 days)     09/30/24  1204   INR 1.97*       ASSESSMENT     Source(s): Chart Review and Patient/Caregiver Call     Warfarin doses taken: Warfarin taken as instructed and Recent MD increase  Diet: No new diet changes identified  Medication/supplement changes: None noted  New illness, injury, or hospitalization: No  Signs or symptoms of bleeding or clotting: No  Previous result: Subtherapeutic  Additional findings: Recent MD increase -- only 2 days ago. No adjustments as he's trending appropriately. Jim unsure when he can get his INR checked, but states he will check it next week.        PLAN     Recommended plan for no diet, medication or health factor changes affecting INR     Dosing Instructions: Continue your current warfarin dose with next INR in 1 week       Summary  As of 9/30/2024      Full warfarin instructions:  2.5 mg every Wed; 3.75 mg all other days   Next INR check:  10/11/2024               Telephone call with Jim who verbalizes understanding and agrees to plan and who agrees to plan and repeated back plan correctly    Patient offered & declined to schedule next visit    Education provided: Please call back if any changes to your diet, medications or how you've been taking warfarin    Plan made per [facility] anticoagulation protocol    Michelle Phipps RN  9/30/2024  Anticoagulation Clinic  [employer] for routing messages: fe [facility] patient phone line: [phone]        _______________________________________________________________________     Anticoagulation Episode Summary       Current INR goal:  2.0-3.0   TTR:  39.9% (3.6 mo)   Target end date:  Indefinite   Send INR reminders to:  [facility]    Indications    Transplanted heart (H) [Z94.1]  Pulmonary embolism (H) [I26.99]  Single subsegmental pulmonary embolism without  acute cor pulmonale (H) [I26.93]  History of pulmonary embolism [Z86.711]             Comments:               Anticoagulation Care Providers       Provider Role Specialty Phone number    Riaz Montaño MD Referring Cardiovascular Disease [phone]    Rajani Wilder, TIM CNP Referring Cardiovascular Disease [phone]    Karthik Seaman MD Referring Internal Medicine [phone]

## 2024-09-30 NOTE — LETTER
2024      Jim Willingham  [address] [facility] [address]  [facility] [address]      Dear Colleague,    Thank you for referring your patient, Jim Willingham, to the [facility]. Please see a copy of my visit note below.    Transplant Surgery Consult Note     Medical record number: [id number]  YOB: 1955,   Consult requested  for evaluation of kidney transplant candidacy.    Assessment and Recommendations:Mr. Willingham appears to be a excellent candidate for kidney transplantation and has a good understanding of the risks and benefits of this approach to the management of renal failure. The following issues should be addressed prior to finalizing his transplant candidacy:     Transplant order: Mr. 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  Recommend he be considered as a candidate for LDKT or DDKT  .  Cardiology consult for cardiac risk stratification to be ordered: Yes  CT abdomen and pelvis without contrast to be ordered for assessment of vascular targets: No [already done]  Transplant listing labs ordered to include HLA, ABOx2, Cr, etc.  Dietician consult ordered: Yes  Social work consult ordered: Yes  Imaging reports reviewed:  CT abd   Radiology images reviewed:CT abd   Recipient suitable to move forward with work up of living donors:  Yes    Vasc targets: CT abd shows adequate vascular targets,but has some atypical thigh discomfort with stairs.  Will request iliac US.    The majority of our visit was spent in counselling, discussing the medical and surgical risks of kidney transplantation. We discussed approximate wait time and how that is influenced by issues such as blood type and sensitization (PRA) and access to a living donor. I contrasted potential waiting time for living vs  donor kidneys from  normal  (0-85%) or higher (%) kidney donor profile index (KDPI) donors and their associated outcomes. I would recommend this individual to consider kidneys from high KDPI donors. The reason for this decision is best summarized as: decreased dialysis related morbidity/mortality, accepting lower kidney graft survival rates. Potential surgical complications of kidney transplantation include bleeding, superficial or deep wound complications (infection, hernia, lymphocele), ureteral anastomotic failure (leak or stenosis), graft thrombosis, need for reoperation and other issues such as cardiac complications, pneumonia, deep venous thrombosis, pulmonary embolism, post transplant diabetes and death. The potential for recurrent disease or need for retransplantation was also addressed. We discussed the possible need for ureteral stent (and subsequent removal), and the utility of protocol biopsy and laboratory studies to evaluate for rejection or recurrent disease. We discussed the risk of graft rejection, our center's average graft and patient survival rates, immunosuppression protocols, as well as the potential opportunity to participate in clinical trials.  We also discussed the average length of stay, recovery process, and posttransplant lab and monitoring protocol.  I emphasized the need for strict immunosuppression medication adherence and the potential for complications of immunosuppression such as skin cancer or lymphoma, as well as a very low but not zero risk of donor-derived disease transmission risks (infection, cancer). Mr. Willingham asked good questions and his candidacy will be reviewed at our Multidisciplinary Selection Committee. Thank you for the opportunity to participate in Mr. Willingham's care.      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     Heart transplant May 2021.     History of gastric bypass.  H/o SBO Jan 2024.    Blood type O, PRA 35%    Doing well now.    Activity:  able to mow lawn    IS:  tac, mmf,     The patient is non-diabetic.       Recent serratia pneumonia    Feels thigh fatigue with climbing steps.    On warfarin for PE.  Thinks he is due to come off of it.     The patient is on dialysis.    Has potential kidney donors:  Yes .  Interested in participation in paired exchange if a donor is willing: Doesn't know     The patient has the following pertinent history:       No    Yes  Dialysis:    []      [x] via:       Blood Transfusion                  []      [x]  Number of units:   Most recently:  Pregnancy:    [x]      [] Number:       Previous Transplant:  []      [x] Details:    Cancer    [x]      [] Comment:   Kidney stones   [x]      [] Comment:      Recurrent infections  [x]      []  Type:                  Bladder dysfunction  [x]      [] Cause:    Claudication   []      [x] Distance:  1 flight of stairs.  Not classial  Previous Amputation  [x]      [] Cause:     Chronic anticoagulation  []      [x] Indication: coumadin d/t PE    Catholic  []      []      Past Medical History:   Diagnosis Date     Aspergillus pneumonia (H) 08/2021    A.fumigatus on BAL, treated with voriconazole x2-3 months     Aspiration pneumonia (H) 01/06/2024     Bariatric surgery status 2003     Benign essential hypertension 05/11/2017     Bilateral carpal tunnel syndrome 11/02/2020     BPH with obstruction/lower urinary tract symptoms 03/21/2024     Cardiomyopathy, unspecified (H) 05/08/2017     CKD (chronic kidney disease) stage 3, GFR 30-59 ml/min (H) 05/11/2017     COPD (chronic obstructive pulmonary disease) (H) 11/02/2020     Depression 05/11/2017     Diabetes mellitus (H) 1995     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     Generalized osteoarthritis 09/26/2023     Gouty arthropathy,  chronic, without tophi 11/02/2020     H/O adenomatous polyp of colon 08/03/2016    2 polyps     H/O gastric bypass 05/11/2017     History of type 2 diabetes mellitus 03/28/2023     Hyperlipidemia LDL goal <70 09/27/2022     ICD (implantable cardioverter-defibrillator), biventricular, in situ 05/11/2017     IFG (impaired fasting glucose) 09/26/2023     LVAD (left ventricular assist device) present (H)      Major depression, recurrent, chronic (H) 11/02/2020     Mild anemia 03/18/2022     NICM (nonischemic cardiomyopathy) (H)/ EF 20% 05/11/2017    ECHO: LVEDd. 7.66 cm, Restrictive pattern , Severe mitral valve regurgitation     CECILIA (obstructive sleep apnea) 05/11/2017     Paroxysmal atrial fibrillation (H) 05/11/2017     Paroxysmal VT (H) 05/11/2017     Peripheral polyneuropathy 03/28/2023     Postsurgical dumping syndrome 03/21/2024     Pulmonary cavitary lesion 11/18/2021     Rotator cuff tear arthropathy of both shoulders 11/02/2020     Type 2 diabetes mellitus with diabetic polyneuropathy (H) 03/18/2022     Uncomplicated asthma      Vitamin B12 deficiency (non anemic) 05/11/2017     Past Surgical History:   Procedure Laterality Date     ANESTHESIA CARDIOVERSION N/A 05/11/2020    Procedure: ANESTHESIA, FOR CARDIOVERSION @1100;  Surgeon: GENERIC ANESTHESIA PROVIDER;  [facility]     BRONCHOSCOPY (RIGID OR FLEXIBLE), DIAGNOSTIC N/A 8/30/2021    Procedure: BRONCHOSCOPY, WITH BRONCHOALVEOLAR LAVAGE;  Surgeon: Perlman, David Morris, MD;  Location: [facility] GI     COLONOSCOPY N/A 4/18/2024    Procedure: COLONOSCOPY, WITH POLYPECTOMY;  Surgeon: Jermaine Root MD;  Location: [facility]     CV CORONARY ANGIOGRAM N/A 5/17/2022    Procedure: Coronary Angiogram;  Surgeon: Jeffrey Gibson MD;  Location:  HEART CARDIAC CATH LAB     CV CORONARY ANGIOGRAM N/A 5/23/2023    Procedure: Coronary Angiogram;  Surgeon: Scout Robins MD;  Location:  HEART CARDIAC CATH LAB     CV HEART BIOPSY N/A 5/13/2021     Procedure: Heart Biopsy;  Surgeon: Scout Robins MD;  Location:  HEART CARDIAC CATH LAB     CV HEART BIOPSY N/A 5/20/2021    Procedure: Heart Biopsy;  Surgeon: Jeffrey Gibson MD;  Location:  HEART CARDIAC CATH LAB     CV HEART BIOPSY N/A 5/27/2021    Procedure: Heart Biopsy;  Surgeon: Jac Dover MD;  Location:  HEART CARDIAC CATH LAB     CV HEART BIOPSY N/A 6/7/2021    Procedure: CV HEART BIOPSY;  Surgeon: Jeffrey Gibson MD;  Location:  HEART CARDIAC CATH LAB     CV HEART BIOPSY N/A 6/21/2021    Procedure: CV HEART BIOPSY;  Surgeon: Scout Robins MD;  Location:  HEART CARDIAC CATH LAB     CV HEART BIOPSY N/A 7/5/2021    Procedure: CV HEART BIOPSY;  Surgeon: Jeffrey Gibson MD;  Location:  HEART CARDIAC CATH LAB     CV HEART BIOPSY N/A 7/16/2021    Procedure: Heart Biopsy;  Surgeon: Amadeo Art MD;  Location:  HEART CARDIAC CATH LAB     CV HEART BIOPSY N/A 8/5/2021    Procedure: Heart Biopsy;  Surgeon: Jeffrey Gibson MD;  Location:  HEART CARDIAC CATH LAB     CV HEART BIOPSY N/A 8/31/2021    Procedure: Heart Cath Heart Biopsy;  Surgeon: Jeffrey Gibson MD;  Location:  HEART CARDIAC CATH LAB     CV HEART BIOPSY N/A 9/21/2021    Procedure: CV HEART BIOPSY;  Surgeon: Jeffrey Gibson MD;  Location:  HEART CARDIAC CATH LAB     CV HEART BIOPSY N/A 10/5/2021    Procedure: CV HEART BIOPSY;  Surgeon: Jeffrey Gibson MD;  Location:  HEART CARDIAC CATH LAB     CV HEART BIOPSY N/A 11/4/2021    Procedure: CV HEART BIOPSY;  Surgeon: Nicola Seth MD;  Location:  HEART CARDIAC CATH LAB     CV HEART BIOPSY N/A 5/17/2022    Procedure: Heart Biopsy;  Surgeon: Jeffrey Gibson MD;  Location:  HEART CARDIAC CATH LAB     CV HEART BIOPSY N/A 5/23/2023    Procedure: Heart Biopsy;  Surgeon: Scout Robins MD;  Location:  HEART CARDIAC CATH LAB     CV  HEART BIOPSY N/A 5/23/2024    Procedure: Heart Biopsy;  Surgeon: Precious Bautista MD;  [facility]     CV RIGHT HEART CATH MEASUREMENTS RECORDED N/A 07/24/2019    Procedure: CV RIGHT HEART CATH;  Surgeon: Renu Sears MD;  [facility]     CV RIGHT HEART CATH MEASUREMENTS RECORDED N/A 08/05/2020    Procedure: CV RIGHT HEART CATH;  Surgeon: Nicola Seth MD;  Location:  HEART CARDIAC CATH LAB     CV RIGHT HEART CATH MEASUREMENTS RECORDED N/A 01/07/2021    Procedure: CV RIGHT HEART CATH;  Surgeon: Jac Dover MD;  Location:  HEART CARDIAC CATH LAB     CV RIGHT HEART CATH MEASUREMENTS RECORDED N/A 02/23/2021    Procedure: Heart Cath Right Heart Cath;  Surgeon: Jeffrey Gibson MD;  Location:  HEART CARDIAC CATH LAB     CV RIGHT HEART CATH MEASUREMENTS RECORDED N/A 03/23/2021    Procedure: Heart Cath Right Heart Cath. request for 3/23;  Surgeon: Jeffrey Gibson MD;  Location:  HEART CARDIAC CATH LAB     CV RIGHT HEART CATH MEASUREMENTS RECORDED N/A 5/13/2021    Procedure: Right Heart Cath;  Surgeon: Scout Robins MD;  Location:  HEART CARDIAC CATH LAB     CV RIGHT HEART CATH MEASUREMENTS RECORDED N/A 5/20/2021    Procedure: Right Heart Cath;  Surgeon: Jeffrey Gibson MD;  Location:  HEART CARDIAC CATH LAB     CV RIGHT HEART CATH MEASUREMENTS RECORDED N/A 5/27/2021    Procedure: Right Heart Cath;  Surgeon: Jac Dover MD;  Location:  HEART CARDIAC CATH LAB     CV RIGHT HEART CATH MEASUREMENTS RECORDED N/A 6/7/2021    Procedure: CV RIGHT HEART CATH;  Surgeon: Jeffrey Gibson MD;  Location:  HEART CARDIAC CATH LAB     CV RIGHT HEART CATH MEASUREMENTS RECORDED N/A 6/21/2021    Procedure: CV RIGHT HEART CATH;  Surgeon: Scout Robins MD;  Location:  HEART CARDIAC CATH LAB     CV RIGHT HEART CATH MEASUREMENTS RECORDED N/A 7/5/2021    Procedure: CV RIGHT HEART  CATH;  Surgeon: Jeffrey Gibson MD;  Location:  HEART CARDIAC CATH LAB     CV RIGHT HEART CATH MEASUREMENTS RECORDED N/A 7/16/2021    Procedure: Right Heart Cath;  Surgeon: Amadeo Art MD;  Location:  HEART CARDIAC CATH LAB     CV RIGHT HEART CATH MEASUREMENTS RECORDED N/A 8/5/2021    Procedure: CV RIGHT HEART CATH;  Surgeon: Jeffrey Gibson MD;  Location:  HEART CARDIAC CATH LAB     CV RIGHT HEART CATH MEASUREMENTS RECORDED N/A 8/31/2021    Procedure: Heart Cath Right Heart Cath;  Surgeon: Jeffrey Gibson MD;  Location:  HEART CARDIAC CATH LAB     CV RIGHT HEART CATH MEASUREMENTS RECORDED N/A 9/21/2021    Procedure: CV RIGHT HEART CATH;  Surgeon: Jeffrey Gibson MD;  Location:  HEART CARDIAC CATH LAB     CV RIGHT HEART CATH MEASUREMENTS RECORDED N/A 10/5/2021    Procedure: CV RIGHT HEART CATH;  Surgeon: Jeffrey Gibson MD;  Location:  HEART CARDIAC CATH LAB     CV RIGHT HEART CATH MEASUREMENTS RECORDED N/A 11/4/2021    Procedure: CV RIGHT HEART CATH;  Surgeon: Nicola Seth MD;  Location:  HEART CARDIAC CATH LAB     CV RIGHT HEART CATH MEASUREMENTS RECORDED N/A 5/17/2022    Procedure: Right Heart Catheterization;  Surgeon: Jeffrey Gibson MD;  Location:  HEART CARDIAC CATH LAB     CV RIGHT HEART CATH MEASUREMENTS RECORDED N/A 5/23/2023    Procedure: Right Heart Catheterization;  Surgeon: Scout Robins MD;  Location:  HEART CARDIAC CATH LAB     CV RIGHT HEART CATH MEASUREMENTS RECORDED N/A 5/23/2024    Procedure: Right Heart Cath- pre noon with rush path;  Surgeon: Precious Bautista MD;  Location:  HEART CARDIAC CATH LAB     GI SURGERY  2003    [facility]     INSERT VENTRICULAR ASSIST DEVICE LEFT (HEARTMATE II) N/A 06/19/2017    Procedure: INSERT VENTRICULAR ASSIST DEVICE LEFT (HEARTMATE II);  Median Sternotomy Heartmate II Left Ventricular Assist Device Insertion on Pump Oxygenator;  Surgeon:  Ronnie Quigley MD;  [facility]     IR CHEST TUBE PLACEMENT NON-TUNNELED RIGHT  2021     IR CVC TUNNEL PLACEMENT > 5 YRS OF AGE  2024     LAPAROSCOPY DIAGNOSTIC (GENERAL) N/A 2024    Procedure: Diagnostic Laparoscopy, Exploratory Laparotomy with Extensive lysis of adhesions.;  Surgeon: Frederick Montgomery MD;  [facility]     ORTHOPEDIC SURGERY      right knee wired     PICC DOUBLE LUMEN PLACEMENT Right 2020    5FR PICC DL. Length-43cm (1cm out).     PICC INSERTION - DOUBLE LUMEN Right 2021    IK/BRACH     RELEASE CARPAL TUNNEL BILATERAL Bilateral 2021    Procedure: Bilateral carpal tunnel release;  Surgeon: Jermaine Brand MD;  [facility] OR     TRANSPLANT HEART RECIPIENT N/A 2021    Procedure: Redo median sternotomy, lysis of adhesions, heart transplant recipient, on cardiopulmonary bypass, intraoperative transesophageal echocardiogram per anesthesia, Implantable Cardioverter Defibrillator (ICD) removal;  Surgeon: Ronnie Quigley MD;  Location[facility] OR     Family History   Problem Relation Age of Onset     Cerebrovascular Disease Mother 64     Diabetes Mother      Hypertension Mother      Coronary Artery Disease Father      Diabetes Type 2  Father      Obesity Brother      Obesity Brother      Cerebrovascular Disease Daughter 40     Social History     Socioeconomic History     Marital status:      Spouse name: Not on file     Number of children: Not on file     Years of education: Not on file     Highest education level: Not on file   Occupational History     Not on file   Tobacco Use     Smoking status: Former     Current packs/day: 0.00     Types: Cigarettes     Quit date:      Years since quittin.7     Passive exposure: Never     Smokeless tobacco: Never   Vaping Use     Vaping status: Never Used   Substance and Sexual Activity     Alcohol use: Yes     Comment: on occasion     Drug use: Not Currently     Types: Marijuana     Comment: tried gummy for  anxiety     Sexual activity: Yes     Partners: Female   Other Topics Concern     Parent/sibling w/ CABG, MI or angioplasty before 65F 55M? No   Social History Narrative     Not on file     Social Determinants of Health     Financial Resource Strain: High Risk (8/24/2024)    Financial Resource Strain      Within the past 12 months, have you or your family members you live with been unable to get utilities (heat, electricity) when it was really needed?: Yes   Food Insecurity: Low Risk  (8/24/2024)    Food Insecurity      Within the past 12 months, did you worry that your food would run out before you got money to buy more?: No      Within the past 12 months, did the food you bought just not last and you didn t have money to get more?: No   Transportation Needs: Low Risk  (8/24/2024)    Transportation Needs      Within the past 12 months, has lack of transportation kept you from medical appointments, getting your medicines, non-medical meetings or appointments, work, or from getting things that you need?: No   Physical Activity: Not on file   Stress: Not on file   Social Connections: Not on file   Interpersonal Safety: Unknown (9/13/2024)    Interpersonal Safety      Do you feel physically and emotionally safe where you currently live?: Patient unable to answer      Within the past 12 months, have you been hit, slapped, kicked or otherwise physically hurt by someone?: Patient unable to answer      Within the past 12 months, have you been humiliated or emotionally abused in other ways by your partner or ex-partner?: Patient unable to answer   Housing Stability: Low Risk  (8/24/2024)    Housing Stability      Do you have housing? : Yes      Are you worried about losing your housing?: No       ROS:   CONSTITUTIONAL:  No fevers or chills  EYES: negative for icterus  ENT:  negative for hearing loss, tinnitus and sore throat  RESPIRATORY:  negative for cough, sputum, dyspnea  CARDIOVASCULAR:  negative for chest pain      GASTROINTESTINAL:  negative for nausea, vomiting, diarrhea or constipation  GENITOURINARY:  negative for incontinence, dysuria, bladder emptying problems  HEME:  No easy bruising  INTEGUMENT:  negative for rash and pruritus  NEURO:  Negative for headache, seizure disorder  Allergies:   Allergies   Allergen Reactions     Grass Shortness Of Breath     Ace Inhibitors Cough     Cats      Dust Mites Other (See Comments)     Asthma     Mold Other (See Comments)     Asthma     Penicillins Other (See Comments)     Unknown - childhood exposure    Tolerated Zosyn 9/18-9/20/2020    Per patient report he has tolerated amoxicillin     Sulfa Antibiotics Other (See Comments) and Unknown     Unknown childhood reaction.  Tolerated Bactrim 2021     Medications:  Prescription Medications as of 9/30/2024         Rx Number Disp Refills Start End Last Dispensed Date Next Fill Date Owning Pharmacy    acetaminophen (TYLENOL) 325 MG tablet  100 tablet 0 1/8/2024 --   [facility] [place], [place] - [address] [facility]    Sig: Take 3 tablets (975 mg) by mouth every 8 hours as needed for mild pain    Class: E-Prescribe    Route: Oral    albuterol (PROAIR HFA/PROVENTIL HFA/VENTOLIN HFA) 108 (90 Base) MCG/ACT inhaler  54 g 1 9/27/2024 --   [facility] - [address]    Sig: INHALE 2 INHALATIONS BY MOUTH  EVERY 4 HOURS AS NEEDED FOR  SHORTNESS OF BREATH ,WHEEZING OR COUGH    Class: E-Prescribe    Notes to Pharmacy: Pharmacy may dispense brand covered by insurance (Proair, or proventil or ventolin or generic albuterol inhaler)    allopurinol (ZYLOPRIM) 300 MG tablet  100 tablet 2 9/27/2024 --   [facility] - [address]    Sig: TAKE 1 TABLET BY MOUTH DAILY    Class: E-Prescribe    Notes to Pharmacy: Please send a replace/new response with 100-Day Supply if appropriate to maximize member benefit. Requesting 1 year supply.    Route: Oral    bumetanide  (BUMEX) 1 MG tablet  540 tablet 3 8/28/2024 --   [facility] [address] [place] [address] [address] [facility]    Sig: Take 2 tablets (2 mg) by mouth 2 times daily. Do not take 8/28/24. Restart at 2mg twice a day on 8/29/24.    Class: E-Prescribe    Route: Oral    buPROPion (WELLBUTRIN XL) 300 MG 24 hr tablet  90 tablet 3 8/12/2024 --   [facility] - [address]    Sig: TAKE 1 TABLET BY MOUTH IN THE  MORNING    Class: E-Prescribe    Notes to Pharmacy: Please send a replace/new response with 100-Day Supply if appropriate to maximize member benefit. Requesting 1 year supply.    Route: Oral    calcitRIOL (ROCALTROL) 0.25 MCG capsule  90 capsule 3 7/13/2024 7/13/2025   [facility] [address] [place] [address] [address] [facility]    Sig: Take 1 capsule (0.25 mcg) by mouth daily    Class: E-Prescribe    Route: Oral    Prior authorization: Closed - Other    calcium carbonate (TUMS) 500 MG chewable tablet  -- -- 8/28/2024 --       Sig: Take 1 tablet (500 mg) by mouth 2 times daily.    Class: Historical    Route: Oral    cyanocobalamin (VITAMIN B-12) 1000 MCG tablet  90 tablet 3 6/12/2024 --   [facility] [address] [place] [address] [address] [facility]    Sig: Take 1 tablet (1,000 mcg) by mouth daily    Class: E-Prescribe    Route: Oral    diphenhydrAMINE (BENADRYL) 25 MG tablet  -- --  --       Sig: Take 25 mg by mouth every 6 hours as needed for itching    Class: Historical    Route: Oral    gabapentin (NEURONTIN) 300 MG capsule  -- --  --       Sig: Take 300 mg by mouth daily    Class: Historical    Route: Oral    ipratropium - albuterol 0.5 mg/2.5 mg/3 mL (DUONEB) 0.5-2.5 (3) MG/3ML neb solution  -- --  --       Sig: Take 1 vial by nebulization 4 times daily as needed for shortness of breath, wheezing or cough    Class: Historical    Route: Nebulization    Melatonin 10 MG CAPS  -- --  --       Sig: Take 1 capsule by mouth nightly as needed    Class: Historical     Route: Oral    montelukast (SINGULAIR) 10 MG tablet  100 tablet 3 1/2/2024 --   [facility] - [address]    Sig: TAKE 1 TABLET BY MOUTH AT  BEDTIME    Class: E-Prescribe    Notes to Pharmacy: Please send a replace/new response with 100-Day Supply if appropriate to maximize member benefit. Requesting 1 year supply.    mycophenolate (GENERIC EQUIVALENT) 250 MG capsule  180 capsule 3 5/29/2024 --   [facility] [address] [place] [address]    Sig: Take 2 capsules (500 mg) by mouth 2 times daily    Class: E-Prescribe    Route: Oral    polyethylene glycol (MIRALAX) 17 GM/Dose powder  -- --  --       Sig: Take 1 Capful by mouth daily as needed for constipation    Class: Historical    Route: Oral    rosuvastatin (CRESTOR) 10 MG tablet  90 tablet 1 8/14/2024 --   [facility] - [address]    Sig: TAKE 1 TABLET BY MOUTH DAILY    Class: E-Prescribe    Notes to Pharmacy: Please send a replace/new response with 100-Day Supply if appropriate to maximize member benefit. Requesting 1 year supply.    Route: Oral    tacrolimus (GENERIC EQUIVALENT) 0.5 MG capsule  90 capsule 3 6/5/2024 --   [facility] [address] [place] [address]    Sig: Take 1 capsule (0.5 mg) by mouth every morning With 3 mg capsules    Class: E-Prescribe    Notes to Pharmacy: TXP DT 5/6/2021 (Heart) TXP Dischg DT 5/31/2021 DX Heart replaced by transplant Z94.1 TX [facility] ([place], [place])    Route: Oral    tacrolimus (GENERIC EQUIVALENT) 1 MG capsule  180 capsule 3 8/6/2024 --   [facility] [address] [place] [address]    Sig: TAKE 3 CAPSULES BY MOUTh IN am and in pm.    Class: E-Prescribe    Notes to Pharmacy: TXP DT 5/6/2021 (Heart) TXP Dischg DT   DX Heart replaced by transplant Z94.1 TX [facility] ([place], [place])     "warfarin ANTICOAGULANT (COUMADIN) 2.5 MG tablet  135 tablet 1 9/18/2024 --   [id number] - [facility] [address] [facility]    Sig: Take 1-1.5 tablets daily or as directed    Class: E-Prescribe    warfarin ANTICOAGULANT (COUMADIN) 2.5 MG tablet  -- -- 8/28/2024 --       Sig: Please take 5mg of Warfarin 8/28/2024 and 3.75 mg on 8/29/24. You should then follow dosing recommendations from anticoagulation clinic based on your INR on 8/30/24    Class: Historical          Exam:     /69   Pulse 116   Ht 1.727 m (5' 8\")   Wt 91.2 kg (201 lb)   SpO2 99%   BMI 30.56 kg/m    Appearance: in no apparent distress.   Skin: normal  Eyes:  no redness or discharge.  Sclera anicteric  Head and Neck: Normal, no rashes or jaundice  Respiratory: easy respirations, no audible wheezing.  Abdomen: rounded s/p gastric bypass   Extremities:  , Edema, none  Neuro: grossly normal   Psychiatric: Normal mood and affect    Diagnostics:   Recent Results (from the past 672 hour(s))   INR    Collection Time: 09/03/24  8:51 AM   Result Value Ref Range    INR 1.74 (H) 0.85 - 1.15   Comprehensive metabolic panel    Collection Time: 09/03/24  8:51 AM   Result Value Ref Range    Sodium 134 (L) 135 - 145 mmol/L    Potassium 3.9 3.4 - 5.3 mmol/L    Carbon Dioxide (CO2) 23 22 - 29 mmol/L    Anion Gap 21 (H) 7 - 15 mmol/L    Urea Nitrogen 147.0 (H) 8.0 - 23.0 mg/dL    Creatinine 5.93 (H) 0.67 - 1.17 mg/dL    GFR Estimate 10 (L) >60 mL/min/1.73m2    Calcium 9.0 8.8 - 10.4 mg/dL    Chloride 90 (L) 98 - 107 mmol/L    Glucose 139 (H) 70 - 99 mg/dL    Alkaline Phosphatase 93 40 - 150 U/L    AST 44 0 - 45 U/L    ALT 24 0 - 70 U/L    Protein Total 7.3 6.4 - 8.3 g/dL    Albumin 4.5 3.5 - 5.2 g/dL    Bilirubin Total 0.7 <=1.2 mg/dL   Tacrolimus by Tandem Mass Spectrometry    Collection Time: 09/03/24  8:51 AM   Result Value Ref Range    Tacrolimus by Tandem Mass Spectrometry 5.1 5.0 - 15.0 ug/L    Tacrolimus Last Dose Date 9/2/2024     " Tacrolimus Last Dose Time  9:00 PM    Magnesium    Collection Time: 09/03/24  8:51 AM   Result Value Ref Range    Magnesium 2.7 (H) 1.7 - 2.3 mg/dL   Phosphorus    Collection Time: 09/03/24  8:51 AM   Result Value Ref Range    Phosphorus 8.2 (H) 2.5 - 4.5 mg/dL   HEMOGLOBIN A1C    Collection Time: 09/03/24  8:51 AM   Result Value Ref Range    Hemoglobin A1C 5.8 (H) 0.0 - 5.6 %   CBC with platelets and differential    Collection Time: 09/03/24  8:51 AM   Result Value Ref Range    WBC Count 7.9 4.0 - 11.0 10e3/uL    RBC Count 3.96 (L) 4.40 - 5.90 10e6/uL    Hemoglobin 11.8 (L) 13.3 - 17.7 g/dL    Hematocrit 36.1 (L) 40.0 - 53.0 %    MCV 91 78 - 100 fL    MCH 29.8 26.5 - 33.0 pg    MCHC 32.7 31.5 - 36.5 g/dL    RDW 13.5 10.0 - 15.0 %    Platelet Count 180 150 - 450 10e3/uL    % Neutrophils 61 %    % Lymphocytes 21 %    % Monocytes 10 %    % Eosinophils 8 %    % Basophils 0 %    % Immature Granulocytes 0 %    NRBCs per 100 WBC 0 <1 /100    Absolute Neutrophils 4.8 1.6 - 8.3 10e3/uL    Absolute Lymphocytes 1.7 0.8 - 5.3 10e3/uL    Absolute Monocytes 0.8 0.0 - 1.3 10e3/uL    Absolute Eosinophils 0.6 0.0 - 0.7 10e3/uL    Absolute Basophils 0.0 0.0 - 0.2 10e3/uL    Absolute Immature Granulocytes 0.0 <=0.4 10e3/uL    Absolute NRBCs 0.0 10e3/uL   Hepatitis B core antibody    Collection Time: 09/05/24  8:52 AM   Result Value Ref Range    Hepatitis B Core Antibody Total Reactive (A) Nonreactive   Hepatitis B Surface Antibody    Collection Time: 09/05/24  8:52 AM   Result Value Ref Range    Hepatitis B Surface Antibody Nonreactive     Hepatitis B Surface Antibody Instrument Value <3.50 <8.5 m[IU]/mL   Hepatitis B surface antigen    Collection Time: 09/05/24  8:52 AM   Result Value Ref Range    Hepatitis B Surface Antigen Nonreactive Nonreactive   Quantiferon TB Gold Plus Grey Tube    Collection Time: 09/05/24  8:52 AM    Specimen: Peripheral Blood   Result Value Ref Range    Quantiferon Nil Tube 0.04 IU/mL   Quantiferon TB Gold  Plus Green Tube    Collection Time: 09/05/24  8:52 AM    Specimen: Peripheral Blood   Result Value Ref Range    Quantiferon TB1 Tube 0.04 IU/mL   Quantiferon TB Gold Plus Yellow Tube    Collection Time: 09/05/24  8:52 AM    Specimen: Peripheral Blood   Result Value Ref Range    Quantiferon TB2 Tube 0.05    Quantiferon TB Gold Plus Purple Tube    Collection Time: 09/05/24  8:52 AM    Specimen: Peripheral Blood   Result Value Ref Range    Quantiferon Mitogen 3.81 IU/mL   Hepatitis B core antibody IgM    Collection Time: 09/05/24  8:52 AM   Result Value Ref Range    Hepatitis B Core Antibody IgM Nonreactive Nonreactive   Quantiferon TB Gold Plus    Collection Time: 09/05/24  8:52 AM    Specimen: Peripheral Blood   Result Value Ref Range    Quantiferon-TB Gold Plus Negative Negative    TB1 Ag minus Nil Value 0.00 IU/mL    TB2 Ag minus Nil Value 0.01 IU/mL    Mitogen minus Nil Result 3.77 IU/mL    Nil Result 0.04 IU/mL   INR    Collection Time: 09/11/24 11:00 AM   Result Value Ref Range    INR 2.13 (H) 0.85 - 1.15   INR    Collection Time: 09/13/24  8:44 AM   Result Value Ref Range    INR 2.31 (H) 0.85 - 1.15   Oxalate serum or plasma    Collection Time: 09/16/24  1:04 PM   Result Value Ref Range    Oxalate 14.0 (H) <=2.0 umol/L   Uric acid    Collection Time: 09/16/24  1:04 PM   Result Value Ref Range    Uric Acid 4.5 3.4 - 7.0 mg/dL   Renal panel    Collection Time: 09/16/24  1:04 PM   Result Value Ref Range    Sodium 135 135 - 145 mmol/L    Potassium 4.4 3.4 - 5.3 mmol/L    Chloride 97 (L) 98 - 107 mmol/L    Carbon Dioxide (CO2) 19 (L) 22 - 29 mmol/L    Anion Gap 19 (H) 7 - 15 mmol/L    Glucose 95 70 - 99 mg/dL    Urea Nitrogen 108.0 (H) 8.0 - 23.0 mg/dL    Creatinine 6.15 (H) 0.67 - 1.17 mg/dL    GFR Estimate 9 (L) >60 mL/min/1.73m2    Calcium 8.8 8.8 - 10.4 mg/dL    Albumin 4.1 3.5 - 5.2 g/dL    Phosphorus 5.8 (H) 2.5 - 4.5 mg/dL   Hemoglobin    Collection Time: 09/16/24  1:04 PM   Result Value Ref Range     Hemoglobin 10.1 (L) 13.3 - 17.7 g/dL   Protein  random urine    Collection Time: 09/16/24  1:20 PM   Result Value Ref Range    Total Protein Urine mg/dL 21.6   mg/dL    Total Protein Urine mg/mg Creat 0.40 (H) 0.00 - 0.20 mg/mg Cr    Creatinine Urine mg/dL 54.0 mg/dL   INR    Collection Time: 09/26/24  1:03 PM   Result Value Ref Range    INR 1.77 (H) 0.85 - 1.15   Renal panel    Collection Time: 09/26/24  1:03 PM   Result Value Ref Range    Sodium 139 135 - 145 mmol/L    Potassium 4.1 3.4 - 5.3 mmol/L    Chloride 99 98 - 107 mmol/L    Carbon Dioxide (CO2) 28 22 - 29 mmol/L    Anion Gap 12 7 - 15 mmol/L    Glucose 100 (H) 70 - 99 mg/dL    Urea Nitrogen 34.6 (H) 8.0 - 23.0 mg/dL    Creatinine 4.22 (H) 0.67 - 1.17 mg/dL    GFR Estimate 15 (L) >60 mL/min/1.73m2    Calcium 8.2 (L) 8.8 - 10.4 mg/dL    Albumin 3.9 3.5 - 5.2 g/dL    Phosphorus 4.2 2.5 - 4.5 mg/dL   Ferritin    Collection Time: 09/26/24  1:03 PM   Result Value Ref Range    Ferritin 241 31 - 409 ng/mL   Hemoglobin    Collection Time: 09/26/24  1:03 PM   Result Value Ref Range    Hemoglobin 9.8 (L) 13.3 - 17.7 g/dL   Iron & Iron Binding Capacity    Collection Time: 09/26/24  1:03 PM   Result Value Ref Range    Iron 38 (L) 61 - 157 ug/dL    Iron Binding Capacity 222 (L) 240 - 430 ug/dL    Iron Sat Index 17 15 - 46 %   CBC with platelets    Collection Time: 09/26/24  1:03 PM   Result Value Ref Range    WBC Count 4.3 4.0 - 11.0 10e3/uL    RBC Count 3.34 (L) 4.40 - 5.90 10e6/uL    Hemoglobin 9.8 (L) 13.3 - 17.7 g/dL    Hematocrit 32.7 (L) 40.0 - 53.0 %    MCV 98 78 - 100 fL    MCH 29.3 26.5 - 33.0 pg    MCHC 30.0 (L) 31.5 - 36.5 g/dL    RDW 14.4 10.0 - 15.0 %    Platelet Count 157 150 - 450 10e3/uL   Magnesium    Collection Time: 09/26/24  1:03 PM   Result Value Ref Range    Magnesium 2.2 1.7 - 2.3 mg/dL   Tacrolimus by Tandem Mass Spectrometry    Collection Time: 09/26/24  1:03 PM   Result Value Ref Range    Tacrolimus by Tandem Mass Spectrometry 5.5 5.0 - 15.0  ug/L    Tacrolimus Last Dose Date      Tacrolimus Last Dose Time     Protein  random urine    Collection Time: 09/26/24  1:09 PM   Result Value Ref Range    Total Protein Urine mg/dL 164.0   mg/dL    Total Protein Urine mg/mg Creat 0.89 (H) 0.00 - 0.20 mg/mg Cr    Creatinine Urine mg/dL 184.0 mg/dL     [facility] cPRA   Date Value Ref Range Status   06/07/2021 0  Final     [facility] CPRA   Date Value Ref Range Status   05/20/2024 35  Final   Again, thank you for allowing me to participate in the care of your patient.        Sincerely,        Leonel Ponce MD

## 2024-09-30 NOTE — LETTER
9/30/2024      Jim Willingham  [address] [facility] [address]  [place] [address]      Dear Colleague,    Thank you for referring your patient, Jim Willingham, to the [facility]. Please see a copy of my visit note below.    TRANSPLANT NEPHROLOGY RECIPIENT EVALUATION NOTE    Assessment and Plan:  # Kidney Transplant Evaluation: Patient is a fair candidate overall. Benefits of a living donor transplant were discussed.    Recommendations:  -Frail-PT to improve strength  -Cardiac risk assessment, clearance from Cardiology team  -Iliac US  -PFTs  -pulmonary clearance for recurrent pulmonary infections  -Hematology plan for Anticoagulation duration  -Repeat Oxalate level in 3 months    # ESKD likely multifactorial, from presumed DM, HTN, and cardiorenal from heart failure, and CNI toxicity: WALTER on CKD 3 at time of OHTx 2021. Worsening function with CNIs,  Rapid progression of kidney dysfunction in the past 7months with previous creatinine 1.58 and GFR 48 to creatinine to 4-4.5 and GFR <15. He ultimately started dialysis 9/18/24. He is doing ok on dialysis, continues to struggle with hypervolemia in which he remains on diuretics.  His current IS regimen: Tac 3.5mg Qam/3mg Qpm with a goal of 4-6 and MMF 500mg BID.     # DM Type 2: off oral meds with gastric bypass.  HgbA1c= 5.8    # Cardiac Risk:   #heart transplant 5/2021 for non-ischemic cardiomyopathy   Follows with Dr. Montgomery, last seen 9/2024. Most recent  ECHO 8/24 LVEF 55-60%, abnormal septal motion present. EKG ST.with prolonged QT, QTc 585, Angio 5/23 Minimal non obstructive CAD. ZIo 7/24 SR-ST. No significant sustained arrhythmias    # PAD Screening: Will obtain updated imaging for Transplant Surgery to review    # Sylvester-N-Y: 2003. Oxalate Uptrending, 19.8 9/2024. repeat in 3 months    # Former Smoker: 29 year pack history. 1 pack/day, quit 1995    # Asthma/COPD:  Uses inhaler for cold/exertional use.CXR with pleural-parenchymal scarring,       # PE:5/24 on anticoagulation with warfarin,- following with heme, last seen 7/24 duration not yet determined .    # Recurrent PNA:  H. Influenza 1/2020  Aspergillus 8/2021-on voriconazole x2 months  COVID 8/2021- Monoclonal Ab +Remdesevir  Serratia marescens 8/24-completed 7D antibiotics +5D prednisone burst for possible superimposed COPD exacerbation   Last saw ID 8/2024    # Hx CECILIA:  [id number], off mask with weight loss    # Health Maintenance: Colonoscopy: Up to date, Dermatology: to see in Jan 2025Up to date, and Dental: Up to date    - Discussed the risks and benefits of a transplant, including the risk of surgery and immunosuppression medications.  Patient's overall evaluation will be discussed in the Transplant Program's regular meeting with a final recommendation on the patients suitability for transplant to be made at that time.    Pending completion of the full evaluation, patient presently appears to be enough of an acceptable kidney transplant recipient candidate to have any potential kidney donors start the evaluation process.       Evaluation:  Jim Willingham was seen in consultation at the request of Dr. Leonel Ponce for evaluation as a potential kidney transplant recipient.    Reason for Visit:  Jim Willingham is a 69 year old male with ESKD from diabetes mellitus type 2, who presents for kidney transplant evaluation.    History of Present Illness:   Jim Willingham is a 69 year old male with PMH significant for OHT 5/6/21 for NICM (s/p HM2 LVAD 6/2017), ESRD, COPD, Aspergillus Pneumonia treated with voriconazole, Type II DM, S/p Sylvester-en-Y gastric bypass 2003.     Long standing CKD from presumed from DM,HTN and cardiorenal from  heart failure.He had WALTER episodes on CKD 3 at the time of his heart transplant in 2021. His creatinine has further increased following OHT and long term CNI use. Rapid progression of kidney dysfunction in the past  six months from creatinine 1.58 and GFR 48  to creatinine to 4 to  4.5 and GFR < 15.Serologic work including c3, ,c4, dsDNA, ANCA,  monoclonal screening were negative. 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 Renal imaging without obstruction.             Kidney Disease Hx:        Kidney Disease Dx: Multifactorial,Cardiorenal, DM, HTN, and CNI toxicity       Biopsy Proven: No         On Dialysis: Yes, Date initiated: 9/18/24 and Dialysis Type: Incenter HD;       Primary Nephrologist:  Dr Sarmiento       H/o Kidney Stones: No       H/o Recurrent/Frequent UTI: No         Diabetic Hx: Type 2        Diagnosis Date: 1995       Medication History: insulin in past, oral meds until bypass       Diabetic Control: Controlled (HbA1c <7%)   Last HbA1c: 5.8%       Hypoglycemic Unawareness: No       End-Organ Damage due to DM: Nephropathy, Peripheral neuropathy, and Cerebrovascular disease          Cardiac/Vascular Disease Risk Factors:        Cardiac Risk Factors: Diabetes, Hypertension, CKD, Age (Male > 55, Female > 65), and Ischemic cardiomyopathy s/p heart transplant       Known CAD: yes, Minimal non obstructive CAD 5/23       Known PAD/Caludication Symptoms: No       Known Heart Failure: Decreased LVEF       Arrhythmia: Yes; Hx of afib       Pulmonary Hypertension: mild, per [facility]HC 5/23       Valvular Disease: No       Other: Prolonged QT         Viral Serology Status       CMV IgG Antibody: Positive       EBV IgG Antibody: Positive         Volume Status/Weight:        Volume status: Mildly hypervolemic       Weight:  Acceptable BMI       BMI: There is no height or weight on file to calculate BMI.         Functional Capacity/Frailty:    Able to complete Housework without exertional symptoms. 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 Able to walk a city block. Arrived alone for eval. Identified wife as care partner.    Fatigue/Decreased Energy: [] No [x] Yes Reporting  weakness since May.   Chest Pain or SOB with Exertion: [x] No [] Yes Denies orthopnea   Significant Weight Change: [] No [x] Yes  Additional weight gain/ water weight. On diuretics.    Nausea, Vomiting or Diarrhea: [x] No [] Yes    Fever, Sweats or Chills:  [x] No [] Yes    Leg Swelling [x] No [] Yes        History of Cancer: None    Other Significant Medical Issues: None      Allergy Testing Questions:   Medication that caused a reaction Penicillin (Amoxicillin, Amoxicillin with clavulanic acid, Dicloxacillin) and Sufla Drugs (Sufla/TMP or Bactrim)   Antibiotics used that didn't give an allergic reaction?  Patient doesn't know    COVID Vaccination Up To Date: Not asked    Potential Living Kidney Donors: Yes    Review of Systems:  A comprehensive review of systems was obtained and negative, except as noted in the HPI or PMH.    Past Medical History:   Medical record was reviewed and PMH was discussed with patient and noted below.  Past Medical History:   Diagnosis Date     Aspergillus pneumonia (H) 08/2021    A.fumigatus on BAL, treated with voriconazole x2-3 months     Aspiration pneumonia (H) 01/06/2024     Bariatric surgery status 2003     Benign essential hypertension 05/11/2017     Bilateral carpal tunnel syndrome 11/02/2020     BPH with obstruction/lower urinary tract symptoms 03/21/2024     Cardiomyopathy, unspecified (H) 05/08/2017     CKD (chronic kidney disease) stage 3, GFR 30-59 ml/min (H) 05/11/2017     COPD (chronic obstructive pulmonary disease) (H) 11/02/2020     Depression 05/11/2017     Diabetes mellitus (H) 1995     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     ESRD (end stage renal disease) on dialysis (H) 07/02/2024     Generalized osteoarthritis 09/26/2023     Gouty arthropathy, chronic, without tophi 11/02/2020     H/O adenomatous polyp of colon 08/03/2016    2 polyps     H/O gastric bypass 05/11/2017     History of type 2 diabetes mellitus 03/28/2023     Hyperlipidemia LDL goal <70 09/27/2022      ICD (implantable cardioverter-defibrillator), biventricular, in situ 05/11/2017     IFG (impaired fasting glucose) 09/26/2023     LVAD (left ventricular assist device) present (H)      Major depression, recurrent, chronic (H) 11/02/2020     Mild anemia 03/18/2022     NICM (nonischemic cardiomyopathy) (H)/ EF 20% 05/11/2017    ECHO: LVEDd. 7.66 cm, Restrictive pattern , Severe mitral valve regurgitation     CECILIA (obstructive sleep apnea) 05/11/2017     Paroxysmal atrial fibrillation (H) 05/11/2017     Paroxysmal VT (H) 05/11/2017     Peripheral polyneuropathy 03/28/2023     Postsurgical dumping syndrome 03/21/2024     Pulmonary cavitary lesion 11/18/2021     Pulmonary embolism (H) 05/24/2024     Rotator cuff tear arthropathy of both shoulders 11/02/2020     Type 2 diabetes mellitus with diabetic polyneuropathy (H) 03/18/2022     Uncomplicated asthma      Vitamin B12 deficiency (non anemic) 05/11/2017       Past Social History:   Past Surgical History:   Procedure Laterality Date     ANESTHESIA CARDIOVERSION N/A 05/11/2020    Procedure: ANESTHESIA, FOR CARDIOVERSION @1100;  Surgeon: GENERIC ANESTHESIA PROVIDER;  [facility]     BRONCHOSCOPY (RIGID OR FLEXIBLE), DIAGNOSTIC N/A 8/30/2021    Procedure: BRONCHOSCOPY, WITH BRONCHOALVEOLAR LAVAGE;  Surgeon: Perlman, David Morris, MD;  Location: [facility] GI     COLONOSCOPY N/A 4/18/2024    Procedure: COLONOSCOPY, WITH POLYPECTOMY;  Surgeon: Jermaine Root MD;  [facility]: [facility]     CV CORONARY ANGIOGRAM N/A 5/17/2022    Procedure: Coronary Angiogram;  Surgeon: Jeffrey Gibson MD;  Location:  HEART CARDIAC CATH LAB     CV CORONARY ANGIOGRAM N/A 5/23/2023    Procedure: Coronary Angiogram;  Surgeon: Scout Robins MD;  Location:  HEART CARDIAC CATH LAB     CV HEART BIOPSY N/A 5/13/2021    Procedure: Heart Biopsy;  Surgeon: Scout Robins MD;  Location:  HEART CARDIAC CATH LAB     CV HEART BIOPSY N/A 5/20/2021    Procedure: Heart Biopsy;   Surgeon: Jeffrey Gibson MD;  [facility]     CV HEART BIOPSY N/A 5/27/2021    Procedure: Heart Biopsy;  Surgeon: Jac Dover MD;  [facility]     CV HEART BIOPSY N/A 6/7/2021    Procedure: CV HEART BIOPSY;  Surgeon: Jeffrey Gibson MD;  [facility]     CV HEART BIOPSY N/A 6/21/2021    Procedure: CV HEART BIOPSY;  Surgeon: Scout Robins MD;  [facility]     CV HEART BIOPSY N/A 7/5/2021    Procedure: CV HEART BIOPSY;  Surgeon: Jeffrey Gibson MD;  Location:  HEART CARDIAC CATH LAB     CV HEART BIOPSY N/A 7/16/2021    Procedure: Heart Biopsy;  Surgeon: Amadeo Art MD;  Location:  HEART CARDIAC CATH LAB     CV HEART BIOPSY N/A 8/5/2021    Procedure: Heart Biopsy;  Surgeon: Jeffrey Gibson MD;  Location:  HEART CARDIAC CATH LAB     CV HEART BIOPSY N/A 8/31/2021    Procedure: Heart Cath Heart Biopsy;  Surgeon: Jeffrey Gibson MD;  Location:  HEART CARDIAC CATH LAB     CV HEART BIOPSY N/A 9/21/2021    Procedure: CV HEART BIOPSY;  Surgeon: Jeffrey Gibson MD;  Location:  HEART CARDIAC CATH LAB     CV HEART BIOPSY N/A 10/5/2021    Procedure: CV HEART BIOPSY;  Surgeon: Jeffrey Gibson MD;  Location:  HEART CARDIAC CATH LAB     CV HEART BIOPSY N/A 11/4/2021    Procedure: CV HEART BIOPSY;  Surgeon: Nicola Seth MD;  Location:  HEART CARDIAC CATH LAB     CV HEART BIOPSY N/A 5/17/2022    Procedure: Heart Biopsy;  Surgeon: Jeffrey Gibson MD;  Location:  HEART CARDIAC CATH LAB     CV HEART BIOPSY N/A 5/23/2023    Procedure: Heart Biopsy;  Surgeon: Scout Robins MD;  Location:  HEART CARDIAC CATH LAB     CV HEART BIOPSY N/A 5/23/2024    Procedure: Heart Biopsy;  Surgeon: Precious Bautista MD;  Location:  HEART CARDIAC CATH LAB     CV RIGHT HEART CATH MEASUREMENTS  RECORDED N/A 07/24/2019    Procedure: CV RIGHT HEART CATH;  Surgeon: Renu Sears MD;  [facility]     CV RIGHT HEART CATH MEASUREMENTS RECORDED N/A 08/05/2020    Procedure: CV RIGHT HEART CATH;  Surgeon: Nicola Seth MD;  Location:  HEART CARDIAC CATH LAB     CV RIGHT HEART CATH MEASUREMENTS RECORDED N/A 01/07/2021    Procedure: CV RIGHT HEART CATH;  Surgeon: Jac Dover MD;  Location:  HEART CARDIAC CATH LAB     CV RIGHT HEART CATH MEASUREMENTS RECORDED N/A 02/23/2021    Procedure: Heart Cath Right Heart Cath;  Surgeon: Jeffrey Gibson MD;  Location:  HEART CARDIAC CATH LAB     CV RIGHT HEART CATH MEASUREMENTS RECORDED N/A 03/23/2021    Procedure: Heart Cath Right Heart Cath. request for 3/23;  Surgeon: Jeffrey Gibson MD;  Location:  HEART CARDIAC CATH LAB     CV RIGHT HEART CATH MEASUREMENTS RECORDED N/A 5/13/2021    Procedure: Right Heart Cath;  Surgeon: Scout Robins MD;  Location:  HEART CARDIAC CATH LAB     CV RIGHT HEART CATH MEASUREMENTS RECORDED N/A 5/20/2021    Procedure: Right Heart Cath;  Surgeon: Jeffrey Gibson MD;  Location:  HEART CARDIAC CATH LAB     CV RIGHT HEART CATH MEASUREMENTS RECORDED N/A 5/27/2021    Procedure: Right Heart Cath;  Surgeon: Jac Dover MD;  Location:  HEART CARDIAC CATH LAB     CV RIGHT HEART CATH MEASUREMENTS RECORDED N/A 6/7/2021    Procedure: CV RIGHT HEART CATH;  Surgeon: Jeffrey Gibson MD;  Location:  HEART CARDIAC CATH LAB     CV RIGHT HEART CATH MEASUREMENTS RECORDED N/A 6/21/2021    Procedure: CV RIGHT HEART CATH;  Surgeon: Scout Robins MD;  Location:  HEART CARDIAC CATH LAB     CV RIGHT HEART CATH MEASUREMENTS RECORDED N/A 7/5/2021    Procedure: CV RIGHT HEART CATH;  Surgeon: Jeffrey Gibson MD;  Location:  HEART CARDIAC CATH LAB     CV RIGHT HEART CATH MEASUREMENTS RECORDED N/A 7/16/2021    Procedure:  Right Heart Cath;  Surgeon: Amadeo Art MD;  Location:  HEART CARDIAC CATH LAB     CV RIGHT HEART CATH MEASUREMENTS RECORDED N/A 8/5/2021    Procedure: CV RIGHT HEART CATH;  Surgeon: Jeffrey Gibson MD;  Location:  HEART CARDIAC CATH LAB     CV RIGHT HEART CATH MEASUREMENTS RECORDED N/A 8/31/2021    Procedure: Heart Cath Right Heart Cath;  Surgeon: Jeffrey Gibson MD;  Location:  HEART CARDIAC CATH LAB     CV RIGHT HEART CATH MEASUREMENTS RECORDED N/A 9/21/2021    Procedure: CV RIGHT HEART CATH;  Surgeon: Jeffrey Gibson MD;  Location:  HEART CARDIAC CATH LAB     CV RIGHT HEART CATH MEASUREMENTS RECORDED N/A 10/5/2021    Procedure: CV RIGHT HEART CATH;  Surgeon: Jeffrey Gibson MD;  Location:  HEART CARDIAC CATH LAB     CV RIGHT HEART CATH MEASUREMENTS RECORDED N/A 11/4/2021    Procedure: CV RIGHT HEART CATH;  Surgeon: Nicola Seth MD;  Location:  HEART CARDIAC CATH LAB     CV RIGHT HEART CATH MEASUREMENTS RECORDED N/A 5/17/2022    Procedure: Right Heart Catheterization;  Surgeon: Jeffrey Gibson MD;  Location:  HEART CARDIAC CATH LAB     CV RIGHT HEART CATH MEASUREMENTS RECORDED N/A 5/23/2023    Procedure: Right Heart Catheterization;  Surgeon: Scout Robins MD;  Location:  HEART CARDIAC CATH LAB     CV RIGHT HEART CATH MEASUREMENTS RECORDED N/A 5/23/2024    Procedure: Right Heart Cath- pre noon with rush path;  Surgeon: Precious Bautista MD;  Location:  HEART CARDIAC CATH LAB     GI SURGERY  2003    Sylvester en Y     INSERT VENTRICULAR ASSIST DEVICE LEFT (HEARTMATE II) N/A 06/19/2017    Procedure: INSERT VENTRICULAR ASSIST DEVICE LEFT (HEARTMATE II);  Median Sternotomy Heartmate II Left Ventricular Assist Device Insertion on Pump Oxygenator;  Surgeon: Ronnie Quigley MD;  [facility]     IR CHEST TUBE PLACEMENT NON-TUNNELED RIGHT  7/11/2021     IR CVC TUNNEL PLACEMENT > 5 YRS OF AGE  9/13/2024      LAPAROSCOPY DIAGNOSTIC (GENERAL) N/A 2024    Procedure: Diagnostic Laparoscopy, Exploratory Laparotomy with Extensive lysis of adhesions.;  Surgeon: Frederick Montgomery MD;  [facility]     ORTHOPEDIC SURGERY      right knee wired     PICC DOUBLE LUMEN PLACEMENT Right 2020    5FR PICC DL. Length-43cm (1cm out).     PICC INSERTION - DOUBLE LUMEN Right 2021    IK/BRACH     RELEASE CARPAL TUNNEL BILATERAL Bilateral 2021    Procedure: Bilateral carpal tunnel release;  Surgeon: Jermaine Brand MD;  [facility]     TRANSPLANT HEART RECIPIENT N/A 2021    Procedure: Redo median sternotomy, lysis of adhesions, heart transplant recipient, on cardiopulmonary bypass, intraoperative transesophageal echocardiogram per anesthesia, Implantable Cardioverter Defibrillator (ICD) removal;  Surgeon: Ronnie Quigley MD;  [facility]     Personal history of bleeding or anesthesia problems: No    Family History:  Family History   Problem Relation Age of Onset     Cerebrovascular Disease Mother 64     Diabetes Mother      Hypertension Mother      Coronary Artery Disease Father      Diabetes Type 2  Father      Obesity Brother      Obesity Brother      Kidney failure Maternal Grandmother      Kidney failure Maternal Grandfather      Dementia Paternal Grandmother      Cerebrovascular Disease Daughter 40       Personal History:   Social History     Socioeconomic History     Marital status:      Spouse name: Not on file     Number of children: Not on file     Years of education: Not on file     Highest education level: Not on file   Occupational History     Not on file   Tobacco Use     Smoking status: Former     Current packs/day: 0.00     Types: Cigarettes     Quit date:      Years since quittin.7     Passive exposure: Never     Smokeless tobacco: Never   Vaping Use     Vaping status: Never Used   Substance and Sexual Activity     Alcohol use: Yes     Comment: on occasion     Drug use:  Not Currently     Types: Marijuana     Comment: tried gummy for anxiety     Sexual activity: Yes     Partners: Female   Other Topics Concern     Parent/sibling w/ CABG, MI or angioplasty before 65F 55M? No   Social History Narrative     Not on file     Social Determinants of Health     Financial Resource Strain: High Risk (8/24/2024)    Financial Resource Strain      Within the past 12 months, have you or your family members you live with been unable to get utilities (heat, electricity) when it was really needed?: Yes   Food Insecurity: Low Risk  (8/24/2024)    Food Insecurity      Within the past 12 months, did you worry that your food would run out before you got money to buy more?: No      Within the past 12 months, did the food you bought just not last and you didn t have money to get more?: No   Transportation Needs: Low Risk  (8/24/2024)    Transportation Needs      Within the past 12 months, has lack of transportation kept you from medical appointments, getting your medicines, non-medical meetings or appointments, work, or from getting things that you need?: No   Physical Activity: Not on file   Stress: Not on file   Social Connections: Not on file   Interpersonal Safety: Unknown (9/13/2024)    Interpersonal Safety      Do you feel physically and emotionally safe where you currently live?: Patient unable to answer      Within the past 12 months, have you been hit, slapped, kicked or otherwise physically hurt by someone?: Patient unable to answer      Within the past 12 months, have you been humiliated or emotionally abused in other ways by your partner or ex-partner?: Patient unable to answer   Housing Stability: Low Risk  (8/24/2024)    Housing Stability      Do you have housing? : Yes      Are you worried about losing your housing?: No       Allergies:  Allergies   Allergen Reactions     Grass Shortness Of Breath     Ace Inhibitors Cough     Cats      Dust Mites Other (See Comments)     Asthma     Mold  Other (See Comments)     Asthma     Penicillins Other (See Comments)     Unknown - childhood exposure    Tolerated Zosyn 9/18-9/20/2020    Per patient report he has tolerated amoxicillin     Sulfa Antibiotics Other (See Comments) and Unknown     Unknown childhood reaction.  Tolerated Bactrim 2021       Medications:  Current Outpatient Medications   Medication Sig Dispense Refill     acetaminophen (TYLENOL) 325 MG tablet Take 3 tablets (975 mg) by mouth every 8 hours as needed for mild pain 100 tablet 0     albuterol (PROAIR HFA/PROVENTIL HFA/VENTOLIN HFA) 108 (90 Base) MCG/ACT inhaler INHALE 2 INHALATIONS BY MOUTH  EVERY 4 HOURS AS NEEDED FOR  SHORTNESS OF BREATH ,WHEEZING OR COUGH 54 g 1     allopurinol (ZYLOPRIM) 300 MG tablet TAKE 1 TABLET BY MOUTH DAILY 100 tablet 2     bumetanide (BUMEX) 1 MG tablet Take 2 tablets (2 mg) by mouth 2 times daily. Do not take 8/28/24. Restart at 2mg twice a day on 8/29/24. 540 tablet 3     buPROPion (WELLBUTRIN XL) 300 MG 24 hr tablet TAKE 1 TABLET BY MOUTH IN THE  MORNING 90 tablet 3     calcitRIOL (ROCALTROL) 0.25 MCG capsule Take 1 capsule (0.25 mcg) by mouth daily (Patient not taking: Reported on 9/26/2024) 90 capsule 3     calcium carbonate (TUMS) 500 MG chewable tablet Take 1 tablet (500 mg) by mouth 2 times daily.       cyanocobalamin (VITAMIN B-12) 1000 MCG tablet Take 1 tablet (1,000 mcg) by mouth daily 90 tablet 3     diphenhydrAMINE (BENADRYL) 25 MG tablet Take 25 mg by mouth every 6 hours as needed for itching       gabapentin (NEURONTIN) 300 MG capsule Take 300 mg by mouth daily       ipratropium - albuterol 0.5 mg/2.5 mg/3 mL (DUONEB) 0.5-2.5 (3) MG/3ML neb solution Take 1 vial by nebulization 4 times daily as needed for shortness of breath, wheezing or cough       Melatonin 10 MG CAPS Take 1 capsule by mouth nightly as needed       montelukast (SINGULAIR) 10 MG tablet TAKE 1 TABLET BY MOUTH AT  BEDTIME 100 tablet 3     mycophenolate (GENERIC EQUIVALENT) 250 MG  "capsule Take 2 capsules (500 mg) by mouth 2 times daily 180 capsule 3     polyethylene glycol (MIRALAX) 17 GM/Dose powder Take 1 Capful by mouth daily as needed for constipation       rosuvastatin (CRESTOR) 10 MG tablet TAKE 1 TABLET BY MOUTH DAILY 90 tablet 1     tacrolimus (GENERIC EQUIVALENT) 0.5 MG capsule Take 1 capsule (0.5 mg) by mouth every morning With 3 mg capsules 90 capsule 3     tacrolimus (GENERIC EQUIVALENT) 1 MG capsule TAKE 3 CAPSULES BY MOUTh IN am and in pm. 180 capsule 3     warfarin ANTICOAGULANT (COUMADIN) 2.5 MG tablet Take 1-1.5 tablets daily or as directed 135 tablet 1     warfarin ANTICOAGULANT (COUMADIN) 2.5 MG tablet Please take 5mg of Warfarin 8/28/2024 and 3.75 mg on 8/29/24. You should then follow dosing recommendations from anticoagulation clinic based on your INR on 8/30/24       No current facility-administered medications for this visit.       Vitals:       9/30/24   /69   Pulse 116   Temp --   Temp src --   SpO2 99 %   Weight 91.2 kg (201 lb)   Height 1.727 m (5' 8\")   Pain Score --   Pain Loc --   BMI (Calculated) 30.56       Exam:  GENERAL APPEARANCE: alert and no distress  HENT: mouth without ulcers or lesions  RESP: lungs clear to auscultation - no rales, rhonchi or wheezes  CV: regular rhythm, normal rate, no rub, no murmur  EDEMA:no LE edema bilaterally  ABDOMEN: soft, nondistended, nontender, bowel sounds normal  MS: extremities normal - no gross deformities noted, no evidence of inflammation in joints, no muscle tenderness  SKIN: no rash  Dialysis access: RIJ tunneled line    Results:   Recent Results (from the past 336 hour(s))   Oxalate serum or plasma    Collection Time: 09/16/24  1:04 PM   Result Value Ref Range    Oxalate 14.0 (H) <=2.0 umol/L   Uric acid    Collection Time: 09/16/24  1:04 PM   Result Value Ref Range    Uric Acid 4.5 3.4 - 7.0 mg/dL   Renal panel    Collection Time: 09/16/24  1:04 PM   Result Value Ref Range    Sodium 135 135 - 145 mmol/L    " Potassium 4.4 3.4 - 5.3 mmol/L    Chloride 97 (L) 98 - 107 mmol/L    Carbon Dioxide (CO2) 19 (L) 22 - 29 mmol/L    Anion Gap 19 (H) 7 - 15 mmol/L    Glucose 95 70 - 99 mg/dL    Urea Nitrogen 108.0 (H) 8.0 - 23.0 mg/dL    Creatinine 6.15 (H) 0.67 - 1.17 mg/dL    GFR Estimate 9 (L) >60 mL/min/1.73m2    Calcium 8.8 8.8 - 10.4 mg/dL    Albumin 4.1 3.5 - 5.2 g/dL    Phosphorus 5.8 (H) 2.5 - 4.5 mg/dL   Hemoglobin    Collection Time: 09/16/24  1:04 PM   Result Value Ref Range    Hemoglobin 10.1 (L) 13.3 - 17.7 g/dL   Protein  random urine    Collection Time: 09/16/24  1:20 PM   Result Value Ref Range    Total Protein Urine mg/dL 21.6   mg/dL    Total Protein Urine mg/mg Creat 0.40 (H) 0.00 - 0.20 mg/mg Cr    Creatinine Urine mg/dL 54.0 mg/dL   INR    Collection Time: 09/26/24  1:03 PM   Result Value Ref Range    INR 1.77 (H) 0.85 - 1.15   Renal panel    Collection Time: 09/26/24  1:03 PM   Result Value Ref Range    Sodium 139 135 - 145 mmol/L    Potassium 4.1 3.4 - 5.3 mmol/L    Chloride 99 98 - 107 mmol/L    Carbon Dioxide (CO2) 28 22 - 29 mmol/L    Anion Gap 12 7 - 15 mmol/L    Glucose 100 (H) 70 - 99 mg/dL    Urea Nitrogen 34.6 (H) 8.0 - 23.0 mg/dL    Creatinine 4.22 (H) 0.67 - 1.17 mg/dL    GFR Estimate 15 (L) >60 mL/min/1.73m2    Calcium 8.2 (L) 8.8 - 10.4 mg/dL    Albumin 3.9 3.5 - 5.2 g/dL    Phosphorus 4.2 2.5 - 4.5 mg/dL   Ferritin    Collection Time: 09/26/24  1:03 PM   Result Value Ref Range    Ferritin 241 31 - 409 ng/mL   Hemoglobin    Collection Time: 09/26/24  1:03 PM   Result Value Ref Range    Hemoglobin 9.8 (L) 13.3 - 17.7 g/dL   Iron & Iron Binding Capacity    Collection Time: 09/26/24  1:03 PM   Result Value Ref Range    Iron 38 (L) 61 - 157 ug/dL    Iron Binding Capacity 222 (L) 240 - 430 ug/dL    Iron Sat Index 17 15 - 46 %   CBC with platelets    Collection Time: 09/26/24  1:03 PM   Result Value Ref Range    WBC Count 4.3 4.0 - 11.0 10e3/uL    RBC Count 3.34 (L) 4.40 - 5.90 10e6/uL    Hemoglobin 9.8  (L) 13.3 - 17.7 g/dL    Hematocrit 32.7 (L) 40.0 - 53.0 %    MCV 98 78 - 100 fL    MCH 29.3 26.5 - 33.0 pg    MCHC 30.0 (L) 31.5 - 36.5 g/dL    RDW 14.4 10.0 - 15.0 %    Platelet Count 157 150 - 450 10e3/uL   Magnesium    Collection Time: 09/26/24  1:03 PM   Result Value Ref Range    Magnesium 2.2 1.7 - 2.3 mg/dL   Tacrolimus by [employer] Mass Spectrometry    Collection Time: 09/26/24  1:03 PM   Result Value Ref Range    Tacrolimus by T[employer] Mass Spectrometry 5.5 5.0 - 15.0 ug/L    Tacrolimus Last Dose Date      Tacrolimus Last Dose Time     Protein  random urine    Collection Time: 09/26/24  1:09 PM   Result Value Ref Range    Total Protein Urine mg/dL 164.0   mg/dL    Total Protein Urine mg/mg Creat 0.89 (H) 0.00 - 0.20 mg/mg Cr    Creatinine Urine mg/dL 184.0 mg/dL           Again, thank you for allowing me to participate in the care of your patient.        Sincerely,        Sunshine Vergara NP

## 2024-09-30 NOTE — PROGRESS NOTES
TRANSPLANT NEPHROLOGY RECIPIENT EVALUATION NOTE    Assessment and Plan:  # Kidney Transplant Evaluation: Patient is a fair candidate overall. Benefits of a living donor transplant were discussed.    Recommendations:  -Frail-PT to improve strength  -Cardiac risk assessment, clearance from Cardiology team  -Iliac US  -PFTs  -pulmonary clearance for recurrent pulmonary infections  -Hematology plan for Anticoagulation duration  -Repeat Oxalate level in 3 months    # ESKD likely multifactorial, from presumed DM, HTN, and cardiorenal from heart failure, and CNI toxicity: WALTER on CKD 3 at time of OHTx 2021. Worsening function with CNIs,  Rapid progression of kidney dysfunction in the past 7months with previous creatinine 1.58 and GFR 48 to creatinine to 4-4.5 and GFR <15. He ultimately started dialysis 9/18/24. He is doing ok on dialysis, continues to struggle with hypervolemia in which he remains on diuretics.  His current IS regimen: Tac 3.5mg Qam/3mg Qpm with a goal of 4-6 and MMF 500mg BID.     # DM Type 2: off oral meds with gastric bypass.  HgbA1c= 5.8    # Cardiac Risk:   #heart transplant 5/2021 for non-ischemic cardiomyopathy   Follows with Dr. Montgomery, last seen 9/2024. Most recent  ECHO 8/24 LVEF 55-60%, abnormal septal motion present. EKG ST.with prolonged QT, QTc 585, Angio 5/23 Minimal non obstructive CAD. ZIo 7/24 SR-ST. No significant sustained arrhythmias    # PAD Screening: Will obtain updated imaging for Transplant Surgery to review    # Sylvester-N-Y: 2003. Oxalate Uptrending, 19.8 9/2024. repeat in 3 months    # Former Smoker: 29 year pack history. 1 pack/day, quit 1995    # Asthma/COPD:  Uses inhaler for cold/exertional use.CXR with pleural-parenchymal scarring,      # PE:5/24 on anticoagulation with warfarin,- following with heme, last seen 7/24 duration not yet determined .    # Recurrent PNA:  H. Influenza 1/2020  Aspergillus 8/2021-on voriconazole x2 months  COVID 8/2021- Monoclonal Ab  +Pennieir  Serratia marescens 8/24-completed 7D antibiotics +5D prednisone burst for possible superimposed COPD exacerbation   Last saw ID 8/2024    # Hx CECILIA:  [id number], off mask with weight loss    # Health Maintenance: Colonoscopy: Up to date, Dermatology: to see in Jan 2025Up to date, and Dental: Up to date    - Discussed the risks and benefits of a transplant, including the risk of surgery and immunosuppression medications.  Patient's overall evaluation will be discussed in the Transplant Program's regular meeting with a final recommendation on the patients suitability for transplant to be made at that time.    Pending completion of the full evaluation, patient presently appears to be enough of an acceptable kidney transplant recipient candidate to have any potential kidney donors start the evaluation process.       Evaluation:  Jim Willingham was seen in consultation at the request of Dr. Leonel Ponce for evaluation as a potential kidney transplant recipient.    Reason for Visit:  Jim Willingham is a 69 year old male with ESKD from diabetes mellitus type 2, who presents for kidney transplant evaluation.    History of Present Illness:   Jim Willingham is a 69 year old male with PMH significant for OHT 5/6/21 for NICM (s/p HM2 LVAD 6/2017), ESRD, COPD, Aspergillus Pneumonia treated with voriconazole, Type II DM, S/p Sylvester-en-Y gastric bypass 2003.     Long standing CKD from presumed from DM,HTN and cardiorenal from  heart failure.He had WALTER episodes on CKD 3 at the time of his heart transplant in 2021. His creatinine has further increased following OHT and long term CNI use. Rapid progression of kidney dysfunction in the past  six months from creatinine 1.58 and GFR 48 to creatinine to 4 to  4.5 and GFR < 15.Serologic work including c3, ,c4, dsDNA, ANCA,  monoclonal screening were negative. Tacrolimus level in the lower  end 5-6 mostly.Serum oxalate slightly elevated at 8.6, less to  result in  oxalate nephropathy. Renal imaging without obstruction.             Kidney Disease Hx:        Kidney Disease Dx: Multifactorial,Cardiorenal, DM, HTN, and CNI toxicity       Biopsy Proven: No         On Dialysis: Yes, Date initiated: 9/18/24 and Dialysis Type: Incenter HD;       Primary Nephrologist:  Dr Sarmiento       H/o Kidney Stones: No       H/o Recurrent/Frequent UTI: No         Diabetic Hx: Type 2        Diagnosis Date: 1995       Medication History: insulin in past, oral meds until bypass       Diabetic Control: Controlled (HbA1c <7%)   Last HbA1c: 5.8%       Hypoglycemic Unawareness: No       End-Organ Damage due to DM: Nephropathy, Peripheral neuropathy, and Cerebrovascular disease          Cardiac/Vascular Disease Risk Factors:        Cardiac Risk Factors: Diabetes, Hypertension, CKD, Age (Male > 55, Female > 65), and Ischemic cardiomyopathy s/p heart transplant       Known CAD: yes, Minimal non obstructive CAD 5/23       Known PAD/Caludication Symptoms: No       Known Heart Failure: Decreased LVEF       Arrhythmia: Yes; Hx of afib       Pulmonary Hypertension: mild, per [facility] 5/23       Valvular Disease: No       Other: Prolonged QT         Viral Serology Status       CMV IgG Antibody: Positive       EBV IgG Antibody: Positive         Volume Status/Weight:        Volume status: Mildly hypervolemic       Weight:  Acceptable BMI       BMI: There is no height or weight on file to calculate BMI.         Functional Capacity/Frailty:    Able to complete Housework without exertional symptoms. 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 Able to walk a city block. Arrived alone for eval. Identified wife as care partner.    Fatigue/Decreased Energy: [] No [x] Yes Reporting weakness since May.   Chest Pain or SOB with Exertion: [x] No [] Yes Denies orthopnea   Significant Weight Change: [] No [x] Yes  Additional weight gain/ water weight. On diuretics.    Nausea, Vomiting or Diarrhea: [x] No [] Yes     Fever, Sweats or Chills:  [x] No [] Yes    Leg Swelling [x] No [] Yes        History of Cancer: None    Other Significant Medical Issues: None      Allergy Testing Questions:   Medication that caused a reaction Penicillin (Amoxicillin, Amoxicillin with clavulanic acid, Dicloxacillin) and Sufla Drugs (Sufla/TMP or Bactrim)   Antibiotics used that didn't give an allergic reaction?  Patient doesn't know    COVID Vaccination Up To Date: Not asked    Potential Living Kidney Donors: Yes    Review of Systems:  A comprehensive review of systems was obtained and negative, except as noted in the HPI or PMH.    Past Medical History:   Medical record was reviewed and PMH was discussed with patient and noted below.  Past Medical History:   Diagnosis Date    Aspergillus pneumonia (H) 08/2021    A.fumigatus on BAL, treated with voriconazole x2-3 months    Aspiration pneumonia (H) 01/06/2024    Bariatric surgery status 2003    Benign essential hypertension 05/11/2017    Bilateral carpal tunnel syndrome 11/02/2020    BPH with obstruction/lower urinary tract symptoms 03/21/2024    Cardiomyopathy, unspecified (H) 05/08/2017    CKD (chronic kidney disease) stage 3, GFR 30-59 ml/min (H) 05/11/2017    COPD (chronic obstructive pulmonary disease) (H) 11/02/2020    Depression 05/11/2017    Diabetes mellitus (H) 1995    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    ESRD (end stage renal disease) on dialysis (H) 07/02/2024    Generalized osteoarthritis 09/26/2023    Gouty arthropathy, chronic, without tophi 11/02/2020    H/O adenomatous polyp of colon 08/03/2016    2 polyps    H/O gastric bypass 05/11/2017    History of type 2 diabetes mellitus 03/28/2023    Hyperlipidemia LDL goal <70 09/27/2022    ICD (implantable cardioverter-defibrillator), biventricular, in situ 05/11/2017    IFG (impaired fasting glucose) 09/26/2023    LVAD (left ventricular assist device) present (H)     Major depression, recurrent, chronic (H) 11/02/2020    Mild anemia  03/18/2022    NICM (nonischemic cardiomyopathy) (H)/ EF 20% 05/11/2017    ECHO: LVEDd. 7.66 cm, Restrictive pattern , Severe mitral valve regurgitation    CECILIA (obstructive sleep apnea) 05/11/2017    Paroxysmal atrial fibrillation (H) 05/11/2017    Paroxysmal VT (H) 05/11/2017    Peripheral polyneuropathy 03/28/2023    Postsurgical dumping syndrome 03/21/2024    Pulmonary cavitary lesion 11/18/2021    Pulmonary embolism (H) 05/24/2024    Rotator cuff tear arthropathy of both shoulders 11/02/2020    Type 2 diabetes mellitus with diabetic polyneuropathy (H) 03/18/2022    Uncomplicated asthma     Vitamin B12 deficiency (non anemic) 05/11/2017       Past Social History:   Past Surgical History:   Procedure Laterality Date    ANESTHESIA CARDIOVERSION N/A 05/11/2020    Procedure: ANESTHESIA, FOR CARDIOVERSION @1100;  Surgeon: GENERIC ANESTHESIA PROVIDER;  [facility]: [facility]    BRONCHOSCOPY (RIGID OR FLEXIBLE), DIAGNOSTIC N/A 8/30/2021    Procedure: BRONCHOSCOPY, WITH BRONCHOALVEOLAR LAVAGE;  Surgeon: Perlman, David Morris, MD;  Location: [facility]    COLONOSCOPY N/A 4/18/2024    Procedure: COLONOSCOPY, WITH POLYPECTOMY;  Surgeon: Jermaine Root MD;  Location: [facility]    CV CORONARY ANGIOGRAM N/A 5/17/2022    Procedure: Coronary Angiogram;  Surgeon: Jeffrey Gibson MD;  Location:  [facility] CARD[facility]AC CATH LAB    CV CORONARY ANGIOGRAM N/A 5/23/2023    Procedure: Coronary Angiogram;  Surgeon: Scout Robins MD;  Location:  [facility] CARD[facility]AC CATH LAB    CV HEART BIOPSY N/A 5/13/2021    Procedure: Heart Biopsy;  Surgeon: Scout Robins MD;  Location:  [facility] CARD[facility]AC CATH LAB    CV HEART BIOPSY N/A 5/20/2021    Procedure: Heart Biopsy;  Surgeon: Jeffrey Gibson MD;  Location:  [facility] CARD[facility] CATH LAB    CV HEART BIOPSY N/A 5/27/2021    Procedure: Heart Biopsy;  Surgeon: Jac Dover MD;  Location:  [facility] CARDIAC CATH LAB    CV HEART BIOPSY N/A 6/7/2021    Procedure: CV HEART  BIOPSY;  Surgeon: Jeffrey Gibson MD;  [facility]    CV HEART BIOPSY N/A 6/21/2021    Procedure: CV HEART BIOPSY;  Surgeon: Scout Robins MD;  [facility]    CV HEART BIOPSY N/A 7/5/2021    Procedure: CV HEART BIOPSY;  Surgeon: Jeffrey Gibson MD;  [facility]    CV HEART BIOPSY N/A 7/16/2021    Procedure: Heart Biopsy;  Surgeon: Amadeo Art MD;  [facility]    CV HEART BIOPSY N/A 8/5/2021    Procedure: Heart Biopsy;  Surgeon: Jeffrey Gibson MD;  Location:  [facility] CARD[facility]AC CATH LAB    CV HEART BIOPSY N/A 8/31/2021    Procedure: Heart Cath Heart Biopsy;  Surgeon: Jeffrey Gibson MD;  [facility]    CV HEART BIOPSY N/A 9/21/2021    Procedure: CV HEART BIOPSY;  Surgeon: Jeffrey Gibson MD;  Location:  [facility] CARD[facility] CATH LAB    CV HEART BIOPSY N/A 10/5/2021    Procedure: CV HEART BIOPSY;  Surgeon: Jeffrey Gibson MD;  Location:  [facility] CARDIAC CATH LAB    CV HEART BIOPSY N/A 11/4/2021    Procedure: CV HEART BIOPSY;  Surgeon: Nicola Seth MD;  Location:  [facility]ART CARDIAC CATH LAB    CV HEART BIOPSY N/A 5/17/2022    Procedure: Heart Biopsy;  Surgeon: Jeffrey Gibson MD;  [facility]    CV HEART BIOPSY N/A 5/23/2023    Procedure: Heart Biopsy;  Surgeon: Scout Robins MD;  Location:  HEART CARDIAC CATH LAB    CV HEART BIOPSY N/A 5/23/2024    Procedure: Heart Biopsy;  Surgeon: Precious Bautista MD;  Location:  HEART CARDIAC CATH LAB    CV RIGHT HEART CATH MEASUREMENTS RECORDED N/A 07/24/2019    Procedure: CV RIGHT HEART CATH;  Surgeon: Renu Sears MD;  Location: [facility] CARDIAC CATH LAB    CV RIGHT HEART CATH MEASUREMENTS RECORDED N/A 08/05/2020    Procedure: CV RIGHT HEART CATH;  Surgeon: Nicola Seth MD;  Location: [facility] CARDIAC  CATH LAB    CV RIGHT HEART CATH MEASUREMENTS RECORDED N/A 01/07/2021    Procedure: CV RIGHT HEART CATH;  Surgeon: Jac Dover MD;  [facility]    CV RIGHT HEART CATH MEASUREMENTS RECORDED N/A 02/23/2021    Procedure: Heart Cath Right Heart Cath;  Surgeon: Jeffrey Gibson MD;  [facility]    CV RIGHT HEART CATH MEASUREMENTS RECORDED N/A 03/23/2021    Procedure: Heart Cath Right Heart Cath. request for 3/23;  Surgeon: Jeffrey Gibson MD;  Location:  HEART CARDIAC CATH LAB    CV RIGHT HEART CATH MEASUREMENTS RECORDED N/A 5/13/2021    Procedure: Right Heart Cath;  Surgeon: Scout Robins MD;  Location:  HEART CARDIAC CATH LAB    CV RIGHT HEART CATH MEASUREMENTS RECORDED N/A 5/20/2021    Procedure: Right Heart Cath;  Surgeon: Jeffrey Gibson MD;  Location:  HEART CARDIAC CATH LAB    CV RIGHT HEART CATH MEASUREMENTS RECORDED N/A 5/27/2021    Procedure: Right Heart Cath;  Surgeon: Jac Dover MD;  Location:  HEART CARDIAC CATH LAB    CV RIGHT HEART CATH MEASUREMENTS RECORDED N/A 6/7/2021    Procedure: CV RIGHT HEART CATH;  Surgeon: Jeffrey Gibson MD;  Location:  HEART CARDIAC CATH LAB    CV RIGHT HEART CATH MEASUREMENTS RECORDED N/A 6/21/2021    Procedure: CV RIGHT HEART CATH;  Surgeon: Scout Robins MD;  Location:  HEART CARDIAC CATH LAB    CV RIGHT HEART CATH MEASUREMENTS RECORDED N/A 7/5/2021    Procedure: CV RIGHT HEART CATH;  Surgeon: Jeffrey Gibson MD;  Location:  HEART CARDIAC CATH LAB    CV RIGHT HEART CATH MEASUREMENTS RECORDED N/A 7/16/2021    Procedure: Right Heart Cath;  Surgeon: Amadeo Art MD;  Location:  HEART CARDIAC CATH LAB    CV RIGHT HEART CATH MEASUREMENTS RECORDED N/A 8/5/2021    Procedure: CV RIGHT HEART CATH;  Surgeon: Jeffrey Gibson MD;  Location:  HEART CARDIAC CATH LAB    CV RIGHT HEART CATH  MEASUREMENTS RECORDED N/A 8/31/2021    Procedure: Heart Cath Right Heart Cath;  Surgeon: Jeffrey Gibson MD;  Location:  HEART CARDIAC CATH LAB    CV RIGHT HEART CATH MEASUREMENTS RECORDED N/A 9/21/2021    Procedure: CV RIGHT HEART CATH;  Surgeon: Jeffrey Gibson MD;  Location:  HEART CARDIAC CATH LAB    CV RIGHT HEART CATH MEASUREMENTS RECORDED N/A 10/5/2021    Procedure: CV RIGHT HEART CATH;  Surgeon: Jeffrey Gibson MD;  Location:  HEART CARDIAC CATH LAB    CV RIGHT HEART CATH MEASUREMENTS RECORDED N/A 11/4/2021    Procedure: CV RIGHT HEART CATH;  Surgeon: Nicola Seth MD;  Location:  HEART CARDIAC CATH LAB    CV RIGHT HEART CATH MEASUREMENTS RECORDED N/A 5/17/2022    Procedure: Right Heart Catheterization;  Surgeon: Jeffrey Gibson MD;  Location:  HEART CARDIAC CATH LAB    CV RIGHT HEART CATH MEASUREMENTS RECORDED N/A 5/23/2023    Procedure: Right Heart Catheterization;  Surgeon: Scout Robins MD;  Location:  HEART CARDIAC CATH LAB    CV RIGHT HEART CATH MEASUREMENTS RECORDED N/A 5/23/2024    Procedure: Right Heart Cath- pre noon with rush path;  Surgeon: Precious Bautista MD;  Location:  HEART CARDIAC CATH LAB    GI SURGERY  2003    Sylvester en Y    INSERT VENTRICULAR ASSIST DEVICE LEFT (HEARTMATE II) N/A 06/19/2017    Procedure: INSERT VENTRICULAR ASSIST DEVICE LEFT (HEARTMATE II);  Median Sternotomy Heartmate II Left Ventricular Assist Device Insertion on Pump Oxygenator;  Surgeon: Ronnie Quigley MD;  [facility]: [facility]    IR CHEST TUBE PLACEMENT NON-TUNNELED RIGHT  7/11/2021    IR CVC TUNNEL PLACEMENT > 5 YRS OF AGE  9/13/2024    LAPAROSCOPY DIAGNOSTIC (GENERAL) N/A 1/4/2024    Procedure: Diagnostic Laparoscopy, Exploratory Laparotomy with Extensive lysis of adhesions.;  Surgeon: Frederick Montgomery MD;  [facility]    ORTHOPEDIC SURGERY  1994    right knee wired    PICC DOUBLE LUMEN PLACEMENT Right 09/23/2020    5FR PICC DL.  Length-43cm (1cm out).    PICC INSERTION - DOUBLE LUMEN Right 2021    IK/BRACH    RELEASE CARPAL TUNNEL BILATERAL Bilateral 2021    Procedure: Bilateral carpal tunnel release;  Surgeon: Jermaine Brand MD;  Location: [facility]    TRANSPLANT HEART RECIPIENT N/A 2021    Procedure: Redo median sternotomy, lysis of adhesions, heart transplant recipient, on cardiopulmonary bypass, intraoperative transesophageal echocardiogram per anesthesia, Implantable Cardioverter Defibrillator (ICD) removal;  Surgeon: Ronnie Quigley MD;  Location: [facility]     Personal history of bleeding or anesthesia problems: No    Family History:  Family History   Problem Relation Age of Onset    Cerebrovascular Disease Mother 64    Diabetes Mother     Hypertension Mother     Coronary Artery Disease Father     Diabetes Type 2  Father     Obesity Brother     Obesity Brother     Kidney failure Maternal Grandmother     Kidney failure Maternal Grandfather     Dementia Paternal Grandmother     Cerebrovascular Disease Daughter 40       Personal History:   Social History     Socioeconomic History    Marital status:      Spouse name: Not on file    Number of children: Not on file    Years of education: Not on file    Highest education level: Not on file   Occupational History    Not on file   Tobacco Use    Smoking status: Former     Current packs/day: 0.00     Types: Cigarettes     Quit date:      Years since quittin.7     Passive exposure: Never    Smokeless tobacco: Never   Vaping Use    Vaping status: Never Used   Substance and Sexual Activity    Alcohol use: Yes     Comment: on occasion    Drug use: Not Currently     Types: Marijuana     Comment: tried gummy for anxiety    Sexual activity: Yes     Partners: Female   Other Topics Concern    Parent/sibling w/ CABG, MI or angioplasty before 65F 55M? No   Social History Narrative    Not on file     Social Determinants of Health     Financial Resource Strain: High Risk (2024)     Financial Resource Strain     Within the past 12 months, have you or your family members you live with been unable to get utilities (heat, electricity) when it was really needed?: Yes   Food Insecurity: Low Risk  (8/24/2024)    Food Insecurity     Within the past 12 months, did you worry that your food would run out before you got money to buy more?: No     Within the past 12 months, did the food you bought just not last and you didn t have money to get more?: No   Transportation Needs: Low Risk  (8/24/2024)    Transportation Needs     Within the past 12 months, has lack of transportation kept you from medical appointments, getting your medicines, non-medical meetings or appointments, work, or from getting things that you need?: No   Physical Activity: Not on file   Stress: Not on file   Social Connections: Not on file   Interpersonal Safety: Unknown (9/13/2024)    Interpersonal Safety     Do you feel physically and emotionally safe where you currently live?: Patient unable to answer     Within the past 12 months, have you been hit, slapped, kicked or otherwise physically hurt by someone?: Patient unable to answer     Within the past 12 months, have you been humiliated or emotionally abused in other ways by your partner or ex-partner?: Patient unable to answer   Housing Stability: Low Risk  (8/24/2024)    Housing Stability     Do you have housing? : Yes     Are you worried about losing your housing?: No       Allergies:  Allergies   Allergen Reactions    Grass Shortness Of Breath    Ace Inhibitors Cough    Cats     Dust Mites Other (See Comments)     Asthma    Mold Other (See Comments)     Asthma    Penicillins Other (See Comments)     Unknown - childhood exposure    Tolerated Zosyn 9/18-9/20/2020    Per patient report he has tolerated amoxicillin    Sulfa Antibiotics Other (See Comments) and Unknown     Unknown childhood reaction.  Tolerated Bactrim 2021       Medications:  Current Outpatient Medications    Medication Sig Dispense Refill    acetaminophen (TYLENOL) 325 MG tablet Take 3 tablets (975 mg) by mouth every 8 hours as needed for mild pain 100 tablet 0    albuterol (PROAIR HFA/PROVENTIL HFA/VENTOLIN HFA) 108 (90 Base) MCG/ACT inhaler INHALE 2 INHALATIONS BY MOUTH  EVERY 4 HOURS AS NEEDED FOR  SHORTNESS OF BREATH ,WHEEZING OR COUGH 54 g 1    allopurinol (ZYLOPRIM) 300 MG tablet TAKE 1 TABLET BY MOUTH DAILY 100 tablet 2    bumetanide (BUMEX) 1 MG tablet Take 2 tablets (2 mg) by mouth 2 times daily. Do not take 8/28/24. Restart at 2mg twice a day on 8/29/24. 540 tablet 3    buPROPion (WELLBUTRIN XL) 300 MG 24 hr tablet TAKE 1 TABLET BY MOUTH IN THE  MORNING 90 tablet 3    calcitRIOL (ROCALTROL) 0.25 MCG capsule Take 1 capsule (0.25 mcg) by mouth daily (Patient not taking: Reported on 9/26/2024) 90 capsule 3    calcium carbonate (TUMS) 500 MG chewable tablet Take 1 tablet (500 mg) by mouth 2 times daily.      cyanocobalamin (VITAMIN B-12) 1000 MCG tablet Take 1 tablet (1,000 mcg) by mouth daily 90 tablet 3    diphenhydrAMINE (BENADRYL) 25 MG tablet Take 25 mg by mouth every 6 hours as needed for itching      gabapentin (NEURONTIN) 300 MG capsule Take 300 mg by mouth daily      ipratropium - albuterol 0.5 mg/2.5 mg/3 mL (DUONEB) 0.5-2.5 (3) MG/3ML neb solution Take 1 vial by nebulization 4 times daily as needed for shortness of breath, wheezing or cough      Melatonin 10 MG CAPS Take 1 capsule by mouth nightly as needed      montelukast (SINGULAIR) 10 MG tablet TAKE 1 TABLET BY MOUTH AT  BEDTIME 100 tablet 3    mycophenolate (GENERIC EQUIVALENT) 250 MG capsule Take 2 capsules (500 mg) by mouth 2 times daily 180 capsule 3    polyethylene glycol (MIRALAX) 17 GM/Dose powder Take 1 Capful by mouth daily as needed for constipation      rosuvastatin (CRESTOR) 10 MG tablet TAKE 1 TABLET BY MOUTH DAILY 90 tablet 1    tacrolimus (GENERIC EQUIVALENT) 0.5 MG capsule Take 1 capsule (0.5 mg) by mouth every morning With 3 mg  "capsules 90 capsule 3    tacrolimus (GENERIC EQUIVALENT) 1 MG capsule TAKE 3 CAPSULES BY MOUTh IN am and in pm. 180 capsule 3    warfarin ANTICOAGULANT (COUMADIN) 2.5 MG tablet Take 1-1.5 tablets daily or as directed 135 tablet 1    warfarin ANTICOAGULANT (COUMADIN) 2.5 MG tablet Please take 5mg of Warfarin 8/28/2024 and 3.75 mg on 8/29/24. You should then follow dosing recommendations from anticoagulation clinic based on your INR on 8/30/24       No current facility-administered medications for this visit.       Vitals:       9/30/24   /69   Pulse 116   Temp --   Temp src --   SpO2 99 %   Weight 91.2 kg (201 lb)   Height 1.727 m (5' 8\")   Pain Score --   Pain Loc --   BMI (Calculated) 30.56       Exam:  GENERAL APPEARANCE: alert and no distress  HENT: mouth without ulcers or lesions  RESP: lungs clear to auscultation - no rales, rhonchi or wheezes  CV: regular rhythm, normal rate, no rub, no murmur  EDEMA:no LE edema bilaterally  ABDOMEN: soft, nondistended, nontender, bowel sounds normal  MS: extremities normal - no gross deformities noted, no evidence of inflammation in joints, no muscle tenderness  SKIN: no rash  Dialysis access: RIJ tunneled line    Results:   Recent Results (from the past 336 hour(s))   Oxalate serum or plasma    Collection Time: 09/16/24  1:04 PM   Result Value Ref Range    Oxalate 14.0 (H) <=2.0 umol/L   Uric acid    Collection Time: 09/16/24  1:04 PM   Result Value Ref Range    Uric Acid 4.5 3.4 - 7.0 mg/dL   Renal panel    Collection Time: 09/16/24  1:04 PM   Result Value Ref Range    Sodium 135 135 - 145 mmol/L    Potassium 4.4 3.4 - 5.3 mmol/L    Chloride 97 (L) 98 - 107 mmol/L    Carbon Dioxide (CO2) 19 (L) 22 - 29 mmol/L    Anion Gap 19 (H) 7 - 15 mmol/L    Glucose 95 70 - 99 mg/dL    Urea Nitrogen 108.0 (H) 8.0 - 23.0 mg/dL    Creatinine 6.15 (H) 0.67 - 1.17 mg/dL    GFR Estimate 9 (L) >60 mL/min/1.73m2    Calcium 8.8 8.8 - 10.4 mg/dL    Albumin 4.1 3.5 - 5.2 g/dL    Phosphorus " 5.8 (H) 2.5 - 4.5 mg/dL   Hemoglobin    Collection Time: 09/16/24  1:04 PM   Result Value Ref Range    Hemoglobin 10.1 (L) 13.3 - 17.7 g/dL   Protein  random urine    Collection Time: 09/16/24  1:20 PM   Result Value Ref Range    Total Protein Urine mg/dL 21.6   mg/dL    Total Protein Urine mg/mg Creat 0.40 (H) 0.00 - 0.20 mg/mg Cr    Creatinine Urine mg/dL 54.0 mg/dL   INR    Collection Time: 09/26/24  1:03 PM   Result Value Ref Range    INR 1.77 (H) 0.85 - 1.15   Renal panel    Collection Time: 09/26/24  1:03 PM   Result Value Ref Range    Sodium 139 135 - 145 mmol/L    Potassium 4.1 3.4 - 5.3 mmol/L    Chloride 99 98 - 107 mmol/L    Carbon Dioxide (CO2) 28 22 - 29 mmol/L    Anion Gap 12 7 - 15 mmol/L    Glucose 100 (H) 70 - 99 mg/dL    Urea Nitrogen 34.6 (H) 8.0 - 23.0 mg/dL    Creatinine 4.22 (H) 0.67 - 1.17 mg/dL    GFR Estimate 15 (L) >60 mL/min/1.73m2    Calcium 8.2 (L) 8.8 - 10.4 mg/dL    Albumin 3.9 3.5 - 5.2 g/dL    Phosphorus 4.2 2.5 - 4.5 mg/dL   Ferritin    Collection Time: 09/26/24  1:03 PM   Result Value Ref Range    Ferritin 241 31 - 409 ng/mL   Hemoglobin    Collection Time: 09/26/24  1:03 PM   Result Value Ref Range    Hemoglobin 9.8 (L) 13.3 - 17.7 g/dL   Iron & Iron Binding Capacity    Collection Time: 09/26/24  1:03 PM   Result Value Ref Range    Iron 38 (L) 61 - 157 ug/dL    Iron Binding Capacity 222 (L) 240 - 430 ug/dL    Iron Sat Index 17 15 - 46 %   CBC with platelets    Collection Time: 09/26/24  1:03 PM   Result Value Ref Range    WBC Count 4.3 4.0 - 11.0 10e3/uL    RBC Count 3.34 (L) 4.40 - 5.90 10e6/uL    Hemoglobin 9.8 (L) 13.3 - 17.7 g/dL    Hematocrit 32.7 (L) 40.0 - 53.0 %    MCV 98 78 - 100 fL    MCH 29.3 26.5 - 33.0 pg    MCHC 30.0 (L) 31.5 - 36.5 g/dL    RDW 14.4 10.0 - 15.0 %    Platelet Count 157 150 - 450 10e3/uL   Magnesium    Collection Time: 09/26/24  1:03 PM   Result Value Ref Range    Magnesium 2.2 1.7 - 2.3 mg/dL   Tacrolimus by [employer] Mass Spectrometry    Collection  Time: 09/26/24  1:03 PM   Result Value Ref Range    Tacrolimus by T[employer] Mass Spectrometry 5.5 5.0 - 15.0 ug/L    Tacrolimus Last Dose Date      Tacrolimus Last Dose Time     Protein  random urine    Collection Time: 09/26/24  1:09 PM   Result Value Ref Range    Total Protein Urine mg/dL 164.0   mg/dL    Total Protein Urine mg/mg Creat 0.89 (H) 0.00 - 0.20 mg/mg Cr    Creatinine Urine mg/dL 184.0 mg/dL       I spent a total of 60 minutes on the date of the encounter doing chart review, performing a history and physical exam, completing documentation and any further activities as noted above.

## 2024-09-30 NOTE — PROGRESS NOTES
Transplant Surgery Consult Note     Medical record number: [id number]  YOB: 1955,   Consult requested  for evaluation of kidney transplant candidacy.    Assessment and Recommendations:Mr. Willingham appears to be a excellent candidate for kidney transplantation and has a good understanding of the risks and benefits of this approach to the management of renal failure. The following issues should be addressed prior to finalizing his transplant candidacy:     Transplant order: Mr. 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  Recommend he be considered as a candidate for LDKT or DDKT  .  Cardiology consult for cardiac risk stratification to be ordered: Yes  CT abdomen and pelvis without contrast to be ordered for assessment of vascular targets: No [already done]  Transplant listing labs ordered to include HLA, ABOx2, Cr, etc.  Dietician consult ordered: Yes  Social work consult ordered: Yes  Imaging reports reviewed:  CT abd   Radiology images reviewed:CT abd   Recipient suitable to move forward with work up of living donors:  Yes    Vasc targets: CT abd shows adequate vascular targets,but has some atypical thigh discomfort with stairs.  Will request iliac US.    The majority of our visit was spent in counselling, discussing the medical and surgical risks of kidney transplantation. We discussed approximate wait time and how that is influenced by issues such as blood type and sensitization (PRA) and access to a living donor. I contrasted potential waiting time for living vs  donor kidneys from  normal (0-85%) or higher (%) kidney donor profile index (KDPI) donors and their associated outcomes. I would recommend this individual to consider kidneys from high KDPI donors. The reason for this decision is best summarized as: decreased dialysis related  morbidity/mortality, accepting lower kidney graft survival rates. Potential surgical complications of kidney transplantation include bleeding, superficial or deep wound complications (infection, hernia, lymphocele), ureteral anastomotic failure (leak or stenosis), graft thrombosis, need for reoperation and other issues such as cardiac complications, pneumonia, deep venous thrombosis, pulmonary embolism, post transplant diabetes and death. The potential for recurrent disease or need for retransplantation was also addressed. We discussed the possible need for ureteral stent (and subsequent removal), and the utility of protocol biopsy and laboratory studies to evaluate for rejection or recurrent disease. We discussed the risk of graft rejection, our center's average graft and patient survival rates, immunosuppression protocols, as well as the potential opportunity to participate in clinical trials.  We also discussed the average length of stay, recovery process, and posttransplant lab and monitoring protocol.  I emphasized the need for strict immunosuppression medication adherence and the potential for complications of immunosuppression such as skin cancer or lymphoma, as well as a very low but not zero risk of donor-derived disease transmission risks (infection, cancer). Mr. Willingham asked good questions and his candidacy will be reviewed at our Multidisciplinary Selection Committee. Thank you for the opportunity to participate in Mr. Willingham's care.      Total time: 40 minutes        Leonel Ponce MD  Professor of Surgery  Kidney/Pancreas Transplantation    ---------------------------------------------------------------------------------------------------    HPI: Mr. 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     Heart transplant May 2021.     History of gastric bypass.  H/o SBO Jan 2024.    Blood type O, PRA 35%    Doing well now.    Activity:  able to mow lawn    IS:  tac, mmf,     The patient is  non-diabetic.       Recent serratia pneumonia    Feels thigh fatigue with climbing steps.    On warfarin for PE.  Thinks he is due to come off of it.     The patient is on dialysis.    Has potential kidney donors:  Yes .  Interested in participation in paired exchange if a donor is willing: Doesn't know     The patient has the following pertinent history:       No    Yes  Dialysis:    []      [x] via:       Blood Transfusion                  []      [x]  Number of units:   Most recently:  Pregnancy:    [x]      [] Number:       Previous Transplant:  []      [x] Details:    Cancer    [x]      [] Comment:   Kidney stones   [x]      [] Comment:      Recurrent infections  [x]      []  Type:                  Bladder dysfunction  [x]      [] Cause:    Claudication   []      [x] Distance:  1 flight of stairs.  Not classial  Previous Amputation  [x]      [] Cause:     Chronic anticoagulation  []      [x] Indication: coumadin d/t PE    Lutheran  []      []      Past Medical History:   Diagnosis Date    Aspergillus pneumonia (H) 08/2021    A.fumigatus on BAL, treated with voriconazole x2-3 months    Aspiration pneumonia (H) 01/06/2024    Bariatric surgery status 2003    Benign essential hypertension 05/11/2017    Bilateral carpal tunnel syndrome 11/02/2020    BPH with obstruction/lower urinary tract symptoms 03/21/2024    Cardiomyopathy, unspecified (H) 05/08/2017    CKD (chronic kidney disease) stage 3, GFR 30-59 ml/min (H) 05/11/2017    COPD (chronic obstructive pulmonary disease) (H) 11/02/2020    Depression 05/11/2017    Diabetes mellitus (H) 1995    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    Generalized osteoarthritis 09/26/2023    Gouty arthropathy, chronic, without tophi 11/02/2020    H/O adenomatous polyp of colon 08/03/2016    2 polyps    H/O gastric bypass 05/11/2017    History of type 2 diabetes mellitus 03/28/2023    Hyperlipidemia LDL goal <70 09/27/2022    ICD (implantable cardioverter-defibrillator),  biventricular, in situ 05/11/2017    IFG (impaired fasting glucose) 09/26/2023    LVAD (left ventricular assist device) present (H)     Major depression, recurrent, chronic (H) 11/02/2020    Mild anemia 03/18/2022    NICM (nonischemic cardiomyopathy) (H)/ EF 20% 05/11/2017    ECHO: LVEDd. 7.66 cm, Restrictive pattern , Severe mitral valve regurgitation    CECILIA (obstructive sleep apnea) 05/11/2017    Paroxysmal atrial fibrillation (H) 05/11/2017    Paroxysmal VT (H) 05/11/2017    Peripheral polyneuropathy 03/28/2023    Postsurgical dumping syndrome 03/21/2024    Pulmonary cavitary lesion 11/18/2021    Rotator cuff tear arthropathy of both shoulders 11/02/2020    Type 2 diabetes mellitus with diabetic polyneuropathy (H) 03/18/2022    Uncomplicated asthma     Vitamin B12 deficiency (non anemic) 05/11/2017     Past Surgical History:   Procedure Laterality Date    ANESTHESIA CARDIOVERSION N/A 05/11/2020    Procedure: ANESTHESIA, FOR CARDIOVERSION @1100;  Surgeon: GENERIC ANESTHESIA PROVIDER;  [facility]    BRONCHOSCOPY (RIGID OR FLEXIBLE), DIAGNOSTIC N/A 8/30/2021    Procedure: BRONCHOSCOPY, WITH BRONCHOALVEOLAR LAVAGE;  Surgeon: Perlman, David Morris, MD;  [facility]    COLONOSCOPY N/A 4/18/2024    Procedure: COLONOSCOPY, WITH POLYPECTOMY;  Surgeon: Jermaine Root MD;  [facility]    CV CORONARY ANGIOGRAM N/A 5/17/2022    Procedure: Coronary Angiogram;  Surgeon: Jeffrey Gibson MD;  Location:  [facility] CARD[facility] CATH LAB    CV CORONARY ANGIOGRAM N/A 5/23/2023    Procedure: Coronary Angiogram;  Surgeon: Scout Robins MD;  Location:  HE[facility] CARDIAC CATH LAB    CV HEART BIOPSY N/A 5/13/2021    Procedure: Heart Biopsy;  Surgeon: Scout Robins MD;  Location:  HE[facility] CARDIAC CATH LAB    CV HEART BIOPSY N/A 5/20/2021    Procedure: Heart Biopsy;  Surgeon: Jeffrey Gibson MD;  Location:  HE[facility] CARDIAC CATH LAB    CV HEART BIOPSY N/A 5/27/2021    Procedure:  Heart Biopsy;  Surgeon: Jac Dover MD;  [facility]    CV HEART BIOPSY N/A 6/7/2021    Procedure: CV HEART BIOPSY;  Surgeon: Jeffrey Gibson MD;  [facility]    CV HEART BIOPSY N/A 6/21/2021    Procedure: CV HEART BIOPSY;  Surgeon: Scout Robins MD;  Location:  HEART CARDIAC CATH LAB    CV HEART BIOPSY N/A 7/5/2021    Procedure: CV HEART BIOPSY;  Surgeon: Jeffrey Gibson MD;  [facility]    CV HEART BIOPSY N/A 7/16/2021    Procedure: Heart Biopsy;  Surgeon: Amadeo Art MD;  Location:  HEART CARDIAC CATH LAB    CV HEART BIOPSY N/A 8/5/2021    Procedure: Heart Biopsy;  Surgeon: Jeffrey Gibson MD;  Location:  [facility] CARDIAC CATH LAB    CV HEART BIOPSY N/A 8/31/2021    Procedure: Heart Cath Heart Biopsy;  Surgeon: Jeffrey Gibson MD;  [facility]    CV HEART BIOPSY N/A 9/21/2021    Procedure: CV HEART BIOPSY;  Surgeon: Jeffrey Gibson MD;  Location:  [facility] CARDIAC CATH LAB    CV HEART BIOPSY N/A 10/5/2021    Procedure: CV HEART BIOPSY;  Surgeon: Jeffrey Gibson MD;  Location:  [facility] CARDIAC CATH LAB    CV HEART BIOPSY N/A 11/4/2021    Procedure: CV HEART BIOPSY;  Surgeon: Nicola Seth MD;  Location:  HEART CARDIAC CATH LAB    CV HEART BIOPSY N/A 5/17/2022    Procedure: Heart Biopsy;  Surgeon: Jeffrey Gibson MD;  Location:  HE[facility] CARDIAC CATH LAB    CV HEART BIOPSY N/A 5/23/2023    Procedure: Heart Biopsy;  Surgeon: Scout Robins MD;  Location:  HEART CARDIAC CATH LAB    CV HEART BIOPSY N/A 5/23/2024    Procedure: Heart Biopsy;  Surgeon: Precious Bautista MD;  Location:  HEART CARDIAC CATH LAB    CV RIGHT HEART CATH MEASUREMENTS RECORDED N/A 07/24/2019    Procedure: CV RIGHT HEART CATH;  Surgeon: Renu Sears MD;  Location:  HEART CARDIAC CATH LAB    CV RIGHT  HEART CATH MEASUREMENTS RECORDED N/A 08/05/2020    Procedure: CV RIGHT HEART CATH;  Surgeon: Nicola Seth MD;  Location:  HEART CARDIAC CATH LAB    CV RIGHT HEART CATH MEASUREMENTS RECORDED N/A 01/07/2021    Procedure: CV RIGHT HEART CATH;  Surgeon: Jac Dover MD;  Location:  HEART CARDIAC CATH LAB    CV RIGHT HEART CATH MEASUREMENTS RECORDED N/A 02/23/2021    Procedure: Heart Cath Right Heart Cath;  Surgeon: Jeffrey Gibson MD;  Location:  HEART CARDIAC CATH LAB    CV RIGHT HEART CATH MEASUREMENTS RECORDED N/A 03/23/2021    Procedure: Heart Cath Right Heart Cath. request for 3/23;  Surgeon: Jeffrey Gibson MD;  Location:  HE[facility] CARDIAC CATH LAB    CV RIGHT HEART CATH MEASUREMENTS RECORDED N/A 5/13/2021    Procedure: Right Heart Cath;  Surgeon: Scout Robins MD;  Location:  HEART CARDIAC CATH LAB    CV RIGHT HEART CATH MEASUREMENTS RECORDED N/A 5/20/2021    Procedure: Right Heart Cath;  Surgeon: Jeffrey Gibson MD;  Location:  HEART CARDIAC CATH LAB    CV RIGHT HEART CATH MEASUREMENTS RECORDED N/A 5/27/2021    Procedure: Right Heart Cath;  Surgeon: Jac Dover MD;  Location:  HEART CARDIAC CATH LAB    CV RIGHT HEART CATH MEASUREMENTS RECORDED N/A 6/7/2021    Procedure: CV RIGHT HEART CATH;  Surgeon: Jeffrey Gibson MD;  Location:  HE[facility] CARDIAC CATH LAB    CV RIGHT HEART CATH MEASUREMENTS RECORDED N/A 6/21/2021    Procedure: CV RIGHT HEART CATH;  Surgeon: Scout Robins MD;  Location:  HEART CARDIAC CATH LAB    CV RIGHT HEART CATH MEASUREMENTS RECORDED N/A 7/5/2021    Procedure: CV RIGHT HEART CATH;  Surgeon: Jeffrey Gibson MD;  Location:  HEART CARDIAC CATH LAB    CV RIGHT HEART CATH MEASUREMENTS RECORDED N/A 7/16/2021    Procedure: Right Heart Cath;  Surgeon: Amadeo Art MD;  Location:  HEART CARDIAC CATH LAB    CV RIGHT HEART CATH MEASUREMENTS RECORDED N/A 8/5/2021     Procedure: CV RIGHT HEART CATH;  Surgeon: Jeffrey Gibson MD;  Location:  HE[facility] CARDIAC CATH LAB    CV RIGHT HEART CATH MEASUREMENTS RECORDED N/A 8/31/2021    Procedure: Heart Cath Right Heart Cath;  Surgeon: Jeffrey Gibson MD;  Location:  [facility] CARDIAC CATH LAB    CV RIGHT HEART CATH MEASUREMENTS RECORDED N/A 9/21/2021    Procedure: CV RIGHT HEART CATH;  Surgeon: Jeffrey Gibson MD;  Location:  [facility] CARD[facility] CATH LAB    CV RIGHT HEART CATH MEASUREMENTS RECORDED N/A 10/5/2021    Procedure: CV RIGHT HEART CATH;  Surgeon: Jeffrey Gibson MD;  Location:  [facility] CARDIAC CATH LAB    CV RIGHT HEART CATH MEASUREMENTS RECORDED N/A 11/4/2021    Procedure: CV RIGHT HEART CATH;  Surgeon: Nicola Seth MD;  Location:  HE[facility] CARDIAC CATH LAB    CV RIGHT HEART CATH MEASUREMENTS RECORDED N/A 5/17/2022    Procedure: Right Heart Catheterization;  Surgeon: Jeffrey Gibson MD;  Location:  [facility] CARDIAC CATH LAB    CV RIGHT HEART CATH MEASUREMENTS RECORDED N/A 5/23/2023    Procedure: Right Heart Catheterization;  Surgeon: Scout Robins MD;  Location:  HE[facility] CARDIAC CATH LAB    CV RIGHT HEART CATH MEASUREMENTS RECORDED N/A 5/23/2024    Procedure: Right Heart Cath- pre noon with rush path;  Surgeon: Precious Bautista MD;  Location:  HEART CARDIAC CATH LAB    GI SURGERY  2003    [facility]    INSERT VENTRICULAR ASSIST DEVICE LEFT (HEARTMATE II) N/A 06/19/2017    Procedure: INSERT VENTRICULAR ASSIST DEVICE LEFT (HEARTMATE II);  Median Sternotomy Heartmate II Left Ventricular Assist Device Insertion on Pump Oxygenator;  Surgeon: Ronnie Quigley MD;  [facility]    IR CHEST TUBE PLACEMENT NON-TUNNELED RIGHT  7/11/2021    IR CVC TUNNEL PLACEMENT > 5 YRS OF AGE  9/13/2024    LAPAROSCOPY DIAGNOSTIC (GENERAL) N/A 1/4/2024    Procedure: Diagnostic Laparoscopy, Exploratory Laparotomy with Extensive lysis of adhesions.;  Surgeon: Frederick Montgomery,  MD;  [facility]    ORTHOPEDIC SURGERY      right knee wired    PICC DOUBLE LUMEN PLACEMENT Right 2020    5FR PICC DL. Length-43cm (1cm out).    PICC INSERTION - DOUBLE LUMEN Right 2021    IK/BRACH    RELEASE CARPAL TUNNEL BILATERAL Bilateral 2021    Procedure: Bilateral carpal tunnel release;  Surgeon: Jermaine Barnd MD;  [facility]    TRANSPLANT HEART RECIPIENT N/A 2021    Procedure: Redo median sternotomy, lysis of adhesions, heart transplant recipient, on cardiopulmonary bypass, intraoperative transesophageal echocardiogram per anesthesia, Implantable Cardioverter Defibrillator (ICD) removal;  Surgeon: Ronnie Quigley MD;  [facility]     Family History   Problem Relation Age of Onset    Cerebrovascular Disease Mother 64    Diabetes Mother     Hypertension Mother     Coronary Artery Disease Father     Diabetes Type 2  Father     Obesity Brother     Obesity Brother     Cerebrovascular Disease Daughter 40     Social History     Socioeconomic History    Marital status:      Spouse name: Not on file    Number of children: Not on file    Years of education: Not on file    Highest education level: Not on file   Occupational History    Not on file   Tobacco Use    Smoking status: Former     Current packs/day: 0.00     Types: Cigarettes     Quit date:      Years since quittin.7     Passive exposure: Never    Smokeless tobacco: Never   Vaping Use    Vaping status: Never Used   Substance and Sexual Activity    Alcohol use: Yes     Comment: on occasion    Drug use: Not Currently     Types: Marijuana     Comment: tried gummy for anxiety    Sexual activity: Yes     Partners: Female   Other Topics Concern    Parent/sibling w/ CABG, MI or angioplasty before 65F 55M? No   Social History Narrative    Not on file     Social Determinants of Health     Financial Resource Strain: High Risk (2024)    Financial Resource Strain     Within the past 12 months, have you or your  family members you live with been unable to get utilities (heat, electricity) when it was really needed?: Yes   Food Insecurity: Low Risk  (8/24/2024)    Food Insecurity     Within the past 12 months, did you worry that your food would run out before you got money to buy more?: No     Within the past 12 months, did the food you bought just not last and you didn t have money to get more?: No   Transportation Needs: Low Risk  (8/24/2024)    Transportation Needs     Within the past 12 months, has lack of transportation kept you from medical appointments, getting your medicines, non-medical meetings or appointments, work, or from getting things that you need?: No   Physical Activity: Not on file   Stress: Not on file   Social Connections: Not on file   Interpersonal Safety: Unknown (9/13/2024)    Interpersonal Safety     Do you feel physically and emotionally safe where you currently live?: Patient unable to answer     Within the past 12 months, have you been hit, slapped, kicked or otherwise physically hurt by someone?: Patient unable to answer     Within the past 12 months, have you been humiliated or emotionally abused in other ways by your partner or ex-partner?: Patient unable to answer   Housing Stability: Low Risk  (8/24/2024)    Housing Stability     Do you have housing? : Yes     Are you worried about losing your housing?: No       ROS:   CONSTITUTIONAL:  No fevers or chills  EYES: negative for icterus  ENT:  negative for hearing loss, tinnitus and sore throat  RESPIRATORY:  negative for cough, sputum, dyspnea  CARDIOVASCULAR:  negative for chest pain     GASTROINTESTINAL:  negative for nausea, vomiting, diarrhea or constipation  GENITOURINARY:  negative for incontinence, dysuria, bladder emptying problems  HEME:  No easy bruising  INTEGUMENT:  negative for rash and pruritus  NEURO:  Negative for headache, seizure disorder  Allergies:   Allergies   Allergen Reactions    Grass Shortness Of Breath    Ace  Inhibitors Cough    Cats     Dust Mites Other (See Comments)     Asthma    Mold Other (See Comments)     Asthma    Penicillins Other (See Comments)     Unknown - childhood exposure    Tolerated Zosyn 9/18-9/20/2020    Per patient report he has tolerated amoxicillin    Sulfa Antibiotics Other (See Comments) and Unknown     Unknown childhood reaction.  Tolerated Bactrim 2021     Medications:  Prescription Medications as of 9/30/2024         Rx Number Disp Refills Start End Last Dispensed Date Next Fill Date Owning Pharmacy    acetaminophen (TYLENOL) 325 MG tablet  100 tablet 0 1/8/2024 --   [facility] [place], [place] - [address] [place] [address]    Sig: Take 3 tablets (975 mg) by mouth every 8 hours as needed for mild pain    Class: E-Prescribe    Route: Oral    albuterol (PROAIR HFA/PROVENTIL HFA/VENTOLIN HFA) 108 (90 Base) MCG/ACT inhaler  54 g 1 9/27/2024 --   [facility] - [place], [place] - [address] [place] [address]    Sig: INHALE 2 INHALATIONS BY MOUTH  EVERY 4 HOURS AS NEEDED FOR  SHORTNESS OF BREATH ,WHEEZING OR COUGH    Class: E-Prescribe    Notes to Pharmacy: Pharmacy may dispense brand covered by insurance (Proair, or proventil or ventolin or generic albuterol inhaler)    allopurinol (ZYLOPRIM) 300 MG tablet  100 tablet 2 9/27/2024 --   [facility] - [address]    Sig: TAKE 1 TABLET BY MOUTH DAILY    Class: E-Prescribe    Notes to Pharmacy: Please send a replace/new response with 100-Day Supply if appropriate to maximize member benefit. Requesting 1 year supply.    Route: Oral    bumetanide (BUMEX) 1 MG tablet  540 tablet 3 8/28/2024 --   [id number] - [place], [place] - [address] [facility]    Sig: Take 2 tablets (2 mg) by mouth 2 times daily. Do not take 8/28/24. Restart at 2mg twice a day on 8/29/24.    Class: E-Prescribe    Route: Oral    buPROPion (WELLBUTRIN XL) 300 MG 24 hr tablet  90 tablet 3 8/12/2024 --   [facility] -  [address]    Sig: TAKE 1 TABLET BY MOUTH IN THE  MORNING    Class: E-Prescribe    Notes to Pharmacy: Please send a replace/new response with 100-Day Supply if appropriate to maximize member benefit. Requesting 1 year supply.    Route: Oral    calcitRIOL (ROCALTROL) 0.25 MCG capsule  90 capsule 3 7/13/2024 7/13/2025   [id number] [address] [facility]    Sig: Take 1 capsule (0.25 mcg) by mouth daily    Class: E-Prescribe    Route: Oral    Prior authorization: Closed - Other    calcium carbonate (TUMS) 500 MG chewable tablet  -- -- 8/28/2024 --       Sig: Take 1 tablet (500 mg) by mouth 2 times daily.    Class: Historical    Route: Oral    cyanocobalamin (VITAMIN B-12) 1000 MCG tablet  90 tablet 3 6/12/2024 --   [id number] [address] [facility]    Sig: Take 1 tablet (1,000 mcg) by mouth daily    Class: E-Prescribe    Route: Oral    diphenhydrAMINE (BENADRYL) 25 MG tablet  -- --  --       Sig: Take 25 mg by mouth every 6 hours as needed for itching    Class: Historical    Route: Oral    gabapentin (NEURONTIN) 300 MG capsule  -- --  --       Sig: Take 300 mg by mouth daily    Class: Historical    Route: Oral    ipratropium - albuterol 0.5 mg/2.5 mg/3 mL (DUONEB) 0.5-2.5 (3) MG/3ML neb solution  -- --  --       Sig: Take 1 vial by nebulization 4 times daily as needed for shortness of breath, wheezing or cough    Class: Historical    Route: Nebulization    Melatonin 10 MG CAPS  -- --  --       Sig: Take 1 capsule by mouth nightly as needed    Class: Historical    Route: Oral    montelukast (SINGULAIR) 10 MG tablet  100 tablet 3 1/2/2024 --   [facility] - [address]    Sig: TAKE 1 TABLET BY MOUTH AT  BEDTIME    Class: E-Prescribe    Notes to Pharmacy: Please send a replace/new response with 100-Day Supply if appropriate to maximize member benefit. Requesting 1 year supply.    mycophenolate  (GENERIC EQUIVALENT) 250 MG capsule  180 capsule 3 5/29/2024 --   [facility] [place] [address] [address] [facility]    Sig: Take 2 capsules (500 mg) by mouth 2 times daily    Class: E-Prescribe    Route: Oral    polyethylene glycol (MIRALAX) 17 GM/Dose powder  -- --  --       Sig: Take 1 Capful by mouth daily as needed for constipation    Class: Historical    Route: Oral    rosuvastatin (CRESTOR) 10 MG tablet  90 tablet 1 8/14/2024 --   [facility] - [address]    Sig: TAKE 1 TABLET BY MOUTH DAILY    Class: E-Prescribe    Notes to Pharmacy: Please send a replace/new response with 100-Day Supply if appropriate to maximize member benefit. Requesting 1 year supply.    Route: Oral    tacrolimus (GENERIC EQUIVALENT) 0.5 MG capsule  90 capsule 3 6/5/2024 --   [facility] [address] [place] [address] [address] [facility]    Sig: Take 1 capsule (0.5 mg) by mouth every morning With 3 mg capsules    Class: E-Prescribe    Notes to Pharmacy: TXP DT 5/6/2021 (Heart) TXP Dischg DT 5/31/2021 DX Heart replaced by transplant Z94.1 [facility] ([place], [place])    Route: Oral    tacrolimus (GENERIC EQUIVALENT) 1 MG capsule  180 capsule 3 8/6/2024 --   [facility] [address] [place] [address]    Sig: TAKE 3 CAPSULES BY MOUTh IN am and in pm.    Class: E-Prescribe    Notes to Pharmacy: TXP DT 5/6/2021 (Heart) TXP Dischg DT   DX Heart replaced by transplant Z94.1 TX Center [facility] ([place], [place])    warfarin ANTICOAGULANT (COUMADIN) 2.5 MG tablet  135 tablet 1 9/18/2024 --   [facility] [place], [place] [id number] [facility]    Sig: Take 1-1.5 tablets daily or as directed    Class: E-Prescribe    warfarin ANTICOAGULANT (COUMADIN) 2.5 MG tablet  -- -- 8/28/2024 --       Sig: Please take 5mg of Warfarin 8/28/2024 and 3.75 mg on 8/29/24. You should  "then follow dosing recommendations from anticoagulation clinic based on your INR on 8/30/24    Class: Historical          Exam:     /69   Pulse 116   Ht 1.727 m (5' 8\")   Wt 91.2 kg (201 lb)   SpO2 99%   BMI 30.56 kg/m    Appearance: in no apparent distress.   Skin: normal  Eyes:  no redness or discharge.  Sclera anicteric  Head and Neck: Normal, no rashes or jaundice  Respiratory: easy respirations, no audible wheezing.  Abdomen: rounded s/p gastric bypass   Extremities:  , Edema, none  Neuro: grossly normal   Psychiatric: Normal mood and affect    Diagnostics:   Recent Results (from the past 672 hour(s))   INR    Collection Time: 09/03/24  8:51 AM   Result Value Ref Range    INR 1.74 (H) 0.85 - 1.15   Comprehensive metabolic panel    Collection Time: 09/03/24  8:51 AM   Result Value Ref Range    Sodium 134 (L) 135 - 145 mmol/L    Potassium 3.9 3.4 - 5.3 mmol/L    Carbon Dioxide (CO2) 23 22 - 29 mmol/L    Anion Gap 21 (H) 7 - 15 mmol/L    Urea Nitrogen 147.0 (H) 8.0 - 23.0 mg/dL    Creatinine 5.93 (H) 0.67 - 1.17 mg/dL    GFR Estimate 10 (L) >60 mL/min/1.73m2    Calcium 9.0 8.8 - 10.4 mg/dL    Chloride 90 (L) 98 - 107 mmol/L    Glucose 139 (H) 70 - 99 mg/dL    Alkaline Phosphatase 93 40 - 150 U/L    AST 44 0 - 45 U/L    ALT 24 0 - 70 U/L    Protein Total 7.3 6.4 - 8.3 g/dL    Albumin 4.5 3.5 - 5.2 g/dL    Bilirubin Total 0.7 <=1.2 mg/dL   Tacrolimus by Tandem Mass Spectrometry    Collection Time: 09/03/24  8:51 AM   Result Value Ref Range    Tacrolimus by Tandem Mass Spectrometry 5.1 5.0 - 15.0 ug/L    Tacrolimus Last Dose Date 9/2/2024     Tacrolimus Last Dose Time  9:00 PM    Magnesium    Collection Time: 09/03/24  8:51 AM   Result Value Ref Range    Magnesium 2.7 (H) 1.7 - 2.3 mg/dL   Phosphorus    Collection Time: 09/03/24  8:51 AM   Result Value Ref Range    Phosphorus 8.2 (H) 2.5 - 4.5 mg/dL   HEMOGLOBIN A1C    Collection Time: 09/03/24  8:51 AM   Result Value Ref Range    Hemoglobin A1C 5.8 (H) " 0.0 - 5.6 %   CBC with platelets and differential    Collection Time: 09/03/24  8:51 AM   Result Value Ref Range    WBC Count 7.9 4.0 - 11.0 10e3/uL    RBC Count 3.96 (L) 4.40 - 5.90 10e6/uL    Hemoglobin 11.8 (L) 13.3 - 17.7 g/dL    Hematocrit 36.1 (L) 40.0 - 53.0 %    MCV 91 78 - 100 fL    MCH 29.8 26.5 - 33.0 pg    MCHC 32.7 31.5 - 36.5 g/dL    RDW 13.5 10.0 - 15.0 %    Platelet Count 180 150 - 450 10e3/uL    % Neutrophils 61 %    % Lymphocytes 21 %    % Monocytes 10 %    % Eosinophils 8 %    % Basophils 0 %    % Immature Granulocytes 0 %    NRBCs per 100 WBC 0 <1 /100    Absolute Neutrophils 4.8 1.6 - 8.3 10e3/uL    Absolute Lymphocytes 1.7 0.8 - 5.3 10e3/uL    Absolute Monocytes 0.8 0.0 - 1.3 10e3/uL    Absolute Eosinophils 0.6 0.0 - 0.7 10e3/uL    Absolute Basophils 0.0 0.0 - 0.2 10e3/uL    Absolute Immature Granulocytes 0.0 <=0.4 10e3/uL    Absolute NRBCs 0.0 10e3/uL   Hepatitis B core antibody    Collection Time: 09/05/24  8:52 AM   Result Value Ref Range    Hepatitis B Core Antibody Total Reactive (A) Nonreactive   Hepatitis B Surface Antibody    Collection Time: 09/05/24  8:52 AM   Result Value Ref Range    Hepatitis B Surface Antibody Nonreactive     Hepatitis B Surface Antibody Instrument Value <3.50 <8.5 m[IU]/mL   Hepatitis B surface antigen    Collection Time: 09/05/24  8:52 AM   Result Value Ref Range    Hepatitis B Surface Antigen Nonreactive Nonreactive   Quantiferon TB Gold Plus Grey Tube    Collection Time: 09/05/24  8:52 AM    Specimen: Peripheral Blood   Result Value Ref Range    Quantiferon Nil Tube 0.04 IU/mL   Quantiferon TB Gold Plus Green Tube    Collection Time: 09/05/24  8:52 AM    Specimen: Peripheral Blood   Result Value Ref Range    Quantiferon TB1 Tube 0.04 IU/mL   Quantiferon TB Gold Plus Yellow Tube    Collection Time: 09/05/24  8:52 AM    Specimen: Peripheral Blood   Result Value Ref Range    Quantiferon TB2 Tube 0.05    Quantiferon TB Gold Plus Purple Tube    Collection Time:  09/05/24  8:52 AM    Specimen: Peripheral Blood   Result Value Ref Range    Quantiferon Mitogen 3.81 IU/mL   Hepatitis B core antibody IgM    Collection Time: 09/05/24  8:52 AM   Result Value Ref Range    Hepatitis B Core Antibody IgM Nonreactive Nonreactive   Quantiferon TB Gold Plus    Collection Time: 09/05/24  8:52 AM    Specimen: Peripheral Blood   Result Value Ref Range    Quantiferon-TB Gold Plus Negative Negative    TB1 Ag minus Nil Value 0.00 IU/mL    TB2 Ag minus Nil Value 0.01 IU/mL    Mitogen minus Nil Result 3.77 IU/mL    Nil Result 0.04 IU/mL   INR    Collection Time: 09/11/24 11:00 AM   Result Value Ref Range    INR 2.13 (H) 0.85 - 1.15   INR    Collection Time: 09/13/24  8:44 AM   Result Value Ref Range    INR 2.31 (H) 0.85 - 1.15   Oxalate serum or plasma    Collection Time: 09/16/24  1:04 PM   Result Value Ref Range    Oxalate 14.0 (H) <=2.0 umol/L   Uric acid    Collection Time: 09/16/24  1:04 PM   Result Value Ref Range    Uric Acid 4.5 3.4 - 7.0 mg/dL   Renal panel    Collection Time: 09/16/24  1:04 PM   Result Value Ref Range    Sodium 135 135 - 145 mmol/L    Potassium 4.4 3.4 - 5.3 mmol/L    Chloride 97 (L) 98 - 107 mmol/L    Carbon Dioxide (CO2) 19 (L) 22 - 29 mmol/L    Anion Gap 19 (H) 7 - 15 mmol/L    Glucose 95 70 - 99 mg/dL    Urea Nitrogen 108.0 (H) 8.0 - 23.0 mg/dL    Creatinine 6.15 (H) 0.67 - 1.17 mg/dL    GFR Estimate 9 (L) >60 mL/min/1.73m2    Calcium 8.8 8.8 - 10.4 mg/dL    Albumin 4.1 3.5 - 5.2 g/dL    Phosphorus 5.8 (H) 2.5 - 4.5 mg/dL   Hemoglobin    Collection Time: 09/16/24  1:04 PM   Result Value Ref Range    Hemoglobin 10.1 (L) 13.3 - 17.7 g/dL   Protein  random urine    Collection Time: 09/16/24  1:20 PM   Result Value Ref Range    Total Protein Urine mg/dL 21.6   mg/dL    Total Protein Urine mg/mg Creat 0.40 (H) 0.00 - 0.20 mg/mg Cr    Creatinine Urine mg/dL 54.0 mg/dL   INR    Collection Time: 09/26/24  1:03 PM   Result Value Ref Range    INR 1.77 (H) 0.85 - 1.15   Renal  panel    Collection Time: 09/26/24  1:03 PM   Result Value Ref Range    Sodium 139 135 - 145 mmol/L    Potassium 4.1 3.4 - 5.3 mmol/L    Chloride 99 98 - 107 mmol/L    Carbon Dioxide (CO2) 28 22 - 29 mmol/L    Anion Gap 12 7 - 15 mmol/L    Glucose 100 (H) 70 - 99 mg/dL    Urea Nitrogen 34.6 (H) 8.0 - 23.0 mg/dL    Creatinine 4.22 (H) 0.67 - 1.17 mg/dL    GFR Estimate 15 (L) >60 mL/min/1.73m2    Calcium 8.2 (L) 8.8 - 10.4 mg/dL    Albumin 3.9 3.5 - 5.2 g/dL    Phosphorus 4.2 2.5 - 4.5 mg/dL   Ferritin    Collection Time: 09/26/24  1:03 PM   Result Value Ref Range    Ferritin 241 31 - 409 ng/mL   Hemoglobin    Collection Time: 09/26/24  1:03 PM   Result Value Ref Range    Hemoglobin 9.8 (L) 13.3 - 17.7 g/dL   Iron & Iron Binding Capacity    Collection Time: 09/26/24  1:03 PM   Result Value Ref Range    Iron 38 (L) 61 - 157 ug/dL    Iron Binding Capacity 222 (L) 240 - 430 ug/dL    Iron Sat Index 17 15 - 46 %   CBC with platelets    Collection Time: 09/26/24  1:03 PM   Result Value Ref Range    WBC Count 4.3 4.0 - 11.0 10e3/uL    RBC Count 3.34 (L) 4.40 - 5.90 10e6/uL    Hemoglobin 9.8 (L) 13.3 - 17.7 g/dL    Hematocrit 32.7 (L) 40.0 - 53.0 %    MCV 98 78 - 100 fL    MCH 29.3 26.5 - 33.0 pg    MCHC 30.0 (L) 31.5 - 36.5 g/dL    RDW 14.4 10.0 - 15.0 %    Platelet Count 157 150 - 450 10e3/uL   Magnesium    Collection Time: 09/26/24  1:03 PM   Result Value Ref Range    Magnesium 2.2 1.7 - 2.3 mg/dL   Tacrolimus by Tandem Mass Spectrometry    Collection Time: 09/26/24  1:03 PM   Result Value Ref Range    Tacrolimus by Tandem Mass Spectrometry 5.5 5.0 - 15.0 ug/L    Tacrolimus Last Dose Date      Tacrolimus Last Dose Time     Protein  random urine    Collection Time: 09/26/24  1:09 PM   Result Value Ref Range    Total Protein Urine mg/dL 164.0   mg/dL    Total Protein Urine mg/mg Creat 0.89 (H) 0.00 - 0.20 mg/mg Cr    Creatinine Urine mg/dL 184.0 mg/dL     UNOS cPRA   Date Value Ref Range Status   06/07/2021 0  Final     UNOS  CPRA   Date Value Ref Range Status   05/20/2024 35  Final

## 2024-09-30 NOTE — PROGRESS NOTES
Psychosocial Assessment For Kidney Transplantation  Patient Name/ Age: Jim Willingham 69 year old   Medical Record #: [id number]  Duration of Interview:  30min  Process:   Face-to-Face Interview                (counseling < 50%)   Present at Appointment: Patient (Jim)        : LYNDA Cavralho Date:  September 30, 2024        Type of transplant: Kidney    Donor type:      Cadaver or Family Member (Jim the 4th, Grandson)    Prior Transplants: Yes  - Heart Transplant (2021) Status of Transplant: Stable.            Current Living Situation    Location:   [address] [place] [id number]  With Whom:  Patient reported that he lives in a home in [place] with his wife, Cate, his granddaughter (Graham, 10 yrs old),  and their 3 dogs.        Family/ Social Support:    Supports:  Available, Helpful - Supports include his wife, Cate, younger brother (Shane, living in [place]), his son (Jim the 3rd, living in [place]), his daughter (Barbra, living in [place]), and his three step-sons (Faisal/Emmanuel, Sabiha, and Ottoniel).    Committed Relationship:     Stable/Supportive - Wife (Cate).    Other Supports:    Available, Occasional - Other friends/family.        Activities/ Functional Ability    Current Level: - Ambulatory, however, shared that his balance/fatigue has worsened since starting dialysis. Occasionally uses a walker, motorized scooter, or cane when traveling longer distances.   - Independent with all other ADL's.     Transportation Drives Self.        Vocational/Employment/Financial     Employment   Retired/disabled d/t previous heart transplant.    Job Description   Previously worked as a respiratory therapist.    Income   SSDI and SS [employer]   Insurance      At this time, patient can afford medication costs:  Yes . Medicare Part A & B and [employer] Advantage.        Medical Status    Current Mode of Treatment for ESRD Dialysis. Started September 2024.   "  Complications None.       Behavioral    Tobacco Use: No. Quit smoking in 1995.  Chemical Dependency: No. Social use (1-2 drinks/week).          Psychiatric Impairment:  Reported a diagnosis of depression. Denied concerns and is currently managing with medications, which he shared has been working well. Denied SI concerns today or within past two weeks and denied previous hx self-injurious behaviors, suicide attempts, or hospitalizations. Sees a therapist/ ([facility]) informally 1x/week, who currently works with his granddaughter. Discusses stressors at home and assists with parenting skills.       Reading Ability: Good.  Education Level: Technical College. Recent Legal History: No.       Coping Style/Strategies: \"Trying to look for the silver lining\", listening to music, spending time/talking with his friends/family. Identified his granddaughter as a protective factor.        Ability to Adhere to Complex Medical Regime: Yes     Adherence History: Patient feels confident in their ability to manage their appointments and follow up with the doctor when needed. They report no concerns in maintaining this moving forward.          Education  _X_ Medicare  _X_ Rehabilitation  _X_ Donor issues  _X_ Community resources  _X_ Post discharge housing  _X_ Financial resources  _X_ Medical insurance options  _X_ Psych adjustment  _X_ Family adjustment  _X_ Health Care Directive - Yes, On File.    Psychosocial Risks of Transplant Reviewed and Discussed:  _X_ Increased stress related to emotional,            family, social, employment or financial           situation  _X_ Effect on work and/or disability benefits  _X_ Effect on future health and life           insurance  _X_ Transplant outcome expectations may           not be met  _X_ Mental Health Risks: anxiety,           depression, PTSD, guilt, grief and           chronic fatigue     Notable Items:   None noted.       Final Evaluation/Assessment "   Patient seemed to process information well. Appeared well informed, motivated and able to follow post transplant requirements. Behavior was appropriate during interview. Has adequate income and insurance coverage. Adequate social support. No major contraindications noted for transplant.  At this time patient appears to understand the risks and benefits of transplant.      Recommendation  Acceptable.   Selection Criteria Met:  Plan for support: Yes .  Chemical Dependence: Yes .  Smoking: Yes .  Mental Health: Yes .  Adequate Finances: Yes .   Signature: Erica Alberts [facility]   Title: Clinical

## 2024-09-30 NOTE — PROGRESS NOTES
Summary    Team s concerns/comments: pt scored frail, will need iliac US    Candidacy category: No data was found    Action/Plan: continue evaluation    Expected Selection Meeting Discussion: 10/9/24

## 2024-09-30 NOTE — NURSING NOTE
Frailty Assessment Note: Frail  LFI: Frail    Overall Score 2/5    Weight:   Wt Readings from Last 3 Encounters:   09/30/24 91.2 kg (201 lb)   09/26/24 88.6 kg (195 lb 6.4 oz)   09/16/24 84.8 kg (187 lb)     Height: 172.7 cm    Weight lost over 10lbs in last year: No    Reported Exhaustion/Energy Levels: Moderate    Slowness: 6.75 seconds / 15 feet walking (average of 2 attempts)    Low Activity Level Score: 1      Strength: 32 (average of 3 trials on dominant hand)    Patient says he could not walk a city block without stopping at this time.    George Brooks RN on 9/30/2024 at 8:09 AM

## 2024-10-02 PROBLEM — I26.93 SINGLE SUBSEGMENTAL PULMONARY EMBOLISM WITHOUT ACUTE COR PULMONALE (H): Status: RESOLVED | Noted: 2024-01-01 | Resolved: 2024-01-01

## 2024-10-02 PROBLEM — I26.99 PULMONARY EMBOLISM (H): Status: RESOLVED | Noted: 2024-01-01 | Resolved: 2024-01-01

## 2024-10-02 PROBLEM — Z99.2 ESRD (END STAGE RENAL DISEASE) ON DIALYSIS (H): Status: ACTIVE | Noted: 2024-01-01

## 2024-10-02 PROBLEM — N18.32 STAGE 3B CHRONIC KIDNEY DISEASE (H): Status: RESOLVED | Noted: 2017-05-11 | Resolved: 2024-01-01

## 2024-10-02 PROBLEM — N18.6 ESRD (END STAGE RENAL DISEASE) ON DIALYSIS (H): Status: ACTIVE | Noted: 2024-01-01

## 2024-10-02 NOTE — NURSING NOTE
Patient identified using two patient identifiers.  Ear exam showing wax occlusion completed by provider.  H202/H20 was placed in the both ear(s) via irrigation tool: elephant ear   Dr. Gómez rechecked .

## 2024-10-02 NOTE — PROGRESS NOTES
"  Assessment & Plan     1.  End-stage renal disease on hemodialysis via central line.  Contemplating fistula surgery in November.  Renal failure secondary to acute on chronic kidney failure.  2.  Community-acquired pneumonia for which she was hospitalized August 28, 2024.  Recovered.  3.  Heart replaced by transplant 5/5/2021.  4.  Chronic obstructive lung disease currently stable using albuterol.  Also on Singulair.  5.  Major depression recurrent.  Some improvement with increase in bupropion to 300 mg daily.  At times still feels down particularly with recent kidney failure.  6.  History of Sylvester-en-Y gastric bypass in 2005.  7.  Cerumen impacting ear ear canal bilaterally.  Irrigated.  8.  Gouty arthropathy chronic.  Currently on allopurinol 300 mg a day.  No recent episodes since 2021.  9.  Hyperlipidemia being treated with rosuvastatin 10 mg a day.  10.  Mild anemia of chronic kidney disease.  11.  History of pulm embolism proven by VQ scan on 5/20/2024.  Patient to remain on lifelong anticoagulation therapy with warfarin.  Followed by anticoagulation clinic.  12.  Immuno compromised status due to immunosuppressive medication.  13.  New onset of recent heart failure.  Followed by cardiology and recently Bumex increased to 3 mg twice daily.  14.  Peripheral polyneuropathy for which she is on gabapentin.  15.  COVID 19 booster vaccine provided.    Patient to follow-up for annual physical examination on December 24.      BMI  Estimated body mass index is 28.89 kg/m  as calculated from the following:    Height as of this encounter: 1.727 m (5' 8\").    Weight as of this encounter: 86.2 kg (190 lb).       Subjective   Jim is a 69 year old, presenting for the following health issues:  Follow Up (Ed/FOLLOW UP)        10/2/2024    12:54 PM   Additional Questions   Roomed by Maria R     History of Present Illness       CKD: He uses over the counter pain medication, including tylenol, a few times a week.    He eats 2-3 " "servings of fruits and vegetables daily.He consumes 1 sweetened beverage(s) daily.He exercises with enough effort to increase his heart rate 9 or less minutes per day.  He exercises with enough effort to increase his heart rate 3 or less days per week.   He is taking medications regularly.     Jim is coming for follow-up of chronic health issues.  Since I last saw him he was hospitalized with community-acquired pneumonia developed acute on chronic kidney failure and ended up with end-stage renal disease.  2 weeks ago hemodialysis was started with a central line.  He has a history of major depression and with this new illness he is starting to feel little more down.  Denies dizziness or lightheadedness.  Currently not experiencing dyspnea.  He has been followed by cardiologist for recent heart failure episode.  He is currently on Bumex 3 mg twice a day for it.  He recently underwent evaluation for potential kidney transplant.  Indicates that he is ears feel full and he wanted them checked.  Balance is a little off and he wonders if there is fluid behind the eardrum.  The voices seem little muffled at times.  He is currently not having chest pain or palpitation.  No dizziness.    Review of Systems  Constitutional, HEENT, cardiovascular, pulmonary, GI, , musculoskeletal, neuro, skin, endocrine and psych systems are negative, except as otherwise noted.      Objective    BP 99/58   Pulse 118   Temp 99.1  F (37.3  C) (Temporal)   Resp 18   Ht 1.727 m (5' 8\")   Wt 86.2 kg (190 lb)   SpO2 99%   BMI 28.89 kg/m    Body mass index is 28.89 kg/m .  Physical Exam   GENERAL: alert and no distress  HENT: normal cephalic/atraumatic, both ears: occluded with wax, nose and mouth without ulcers or lesions, oropharynx clear, and oral mucous membranes moist  NECK: no adenopathy, no asymmetry, masses, or scars  RESP: lungs clear to auscultation - no rales, rhonchi or wheezes  CV: regular rate and rhythm, normal S1 S2, no S3 " or S4, no murmur, click or rub, no peripheral edema  ABDOMEN: soft, nontender, no hepatosplenomegaly, no masses and bowel sounds normal  MS: no gross musculoskeletal defects noted, no edema     Latest Reference Range & Units 09/30/24 12:04   Sodium 135 - 145 mmol/L 136   Potassium 3.4 - 5.3 mmol/L 4.5   Chloride 98 - 107 mmol/L 95 (L)   Carbon Dioxide (CO2) 22 - 29 mmol/L 26   Urea Nitrogen 8.0 - 23.0 mg/dL 48.3 (H)   Creatinine 0.67 - 1.17 mg/dL 5.60 (H)   GFR Estimate >60 mL/min/1.73m2 10 (L)   Calcium 8.8 - 10.4 mg/dL 8.4 (L)   Anion Gap 7 - 15 mmol/L 15   Albumin 3.5 - 5.2 g/dL 4.2   Protein Total 6.4 - 8.3 g/dL 6.7   Alkaline Phosphatase 40 - 150 U/L 150   ALT 0 - 70 U/L 33   AST 0 - 45 U/L 41   Bilirubin Total <=1.2 mg/dL 0.7   (L): Data is abnormally low  (H): Data is abnormally high     Latest Reference Range & Units 09/30/24 12:04   Glucose 70 - 99 mg/dL 95      Latest Reference Range & Units 09/30/24 12:04   WBC 4.0 - 11.0 10e3/uL 4.5   Hemoglobin 13.3 - 17.7 g/dL 10.1 (L)   Hematocrit 40.0 - 53.0 % 33.1 (L)   Platelet Count 150 - 450 10e3/uL 156   RBC Count 4.40 - 5.90 10e6/uL 3.36 (L)   MCV 78 - 100 fL 99   MCH 26.5 - 33.0 pg 30.1   MCHC 31.5 - 36.5 g/dL 30.5 (L)   RDW 10.0 - 15.0 % 14.4   % Neutrophils % 49   % Lymphocytes % 34   % Monocytes % 15   % Eosinophils % 2   % Basophils % 1   (L): Data is abnormally low        Signed Electronically by: Ricardo Gómez MD

## 2024-10-02 NOTE — TELEPHONE ENCOUNTER
Fistula creation surgery is scheduled for 11/19/24. Request sent to schedule PAC and post-op appointment.    Maryellen Wilson RN on 10/2/2024 at 1:56 PM

## 2024-10-07 NOTE — TELEPHONE ENCOUNTER
I was paged by Jim Willingham's wife as the patient had just had a cardiac arrest earlier today.  He is currently admitted at [facility] and she expressed desire to have him transferred here. The managing provider was on the phone at this time and noted that they had called bed control seeking transfer but was reportedly told of the delay of 1 to 2 days due to availability.  I communicated the issue to the current staff physician Dr. Mccarty who also called bed control to discuss availability.  He was told a similar timeline.  He independently called the provider at [facility] to communicate regarding the issue and advised transfer to an alternative heart transplant center.  He also advised a call back to our institution if there was similar unavailability to assist in the ongoing management of this heart transplant patient.    Will route this message to the patient's primary cardiologist.     Emmanuel Hernandez MD  Cardiology Fellow

## 2024-10-07 NOTE — TELEPHONE ENCOUNTER
Left Voicemail (1st Attempt) and Sent Mychart (1st Attempt) for the patient to call back and schedule the following:    Appointment type: fistula follow up   Provider: Dr. Strickland   Return date: 2 wks post (DOS 11/19/24)  Specialty phone number: [phone]  Additional appointment(s) needed: pre op within 30 days of surgery w/PCP, or in the PAC clinic [phone].  Additonal Notes: writer [employer], contacted pt to schedule 2 wk fistula follow up around 12/3/24 with either Dr. Strickland, or Maryellen Alanis NP. (DOS 11/19/24)   AL (Noor) 10/7/24

## 2024-10-08 NOTE — TELEPHONE ENCOUNTER
Left Voicemail (2nd Attempt) for the patient to call back and schedule the following:    Appointment type: Fistula follow up   Provider: Dr. Strickland   Return date: 2 wks post DOS on 11/19/24  Specialty phone number: [phone]  Additional appointment(s) needed: H&P in the PAC clinic [phone]  Additonal Notes: on 10/7/24 writer contacted pt to schedule a fistula follow up w/Dr. Strickland or Maryellen Alanis NP (DOS 11/19) Dialysis Access RN Maryellen Wilson instructed writer on 10/8 to delay scheduling, due to pt experiencing recent myocardial infarction.     AL (Noor) 10/8/24

## 2024-10-10 NOTE — TELEPHONE ENCOUNTER
Dialysis Access Care Coordination: General Outreach    REASON FOR CALL:     REASON FOR CALL: Clinic Care Coordination - Chart Review (Dialysis Access)                                   SITUATION/BACKROUND:     Patient had an out of hospital cardiac arrest on 10/6/24. He is now hospitalized with poor prognosis and will likely begin comfort cares.    Neph Tracking Flowsheet Last Filled Values       Kidney Smart CKD Basics Complete 07/31/24    Kidney Smart Modality Education Complete 07/31/24    Dialysis Unit Tour Referral --  7/15/24- [facility].    Preferred Modality Hemodialysis  9/6/2024- referral sent to [facility] admissions for [facility] location-prefer MWF, morning (not early) shift.    Final Modality Hemodialysis    Patient's Referral Dates Auto Populate Patient's Referral Dates    Specialty Care Coordination Referral - Dialysis 7/15/2024    Dietician Referral 9/14/20    MTM Referral 8/25/21    Palliative Referral 10/30/20    Home Care Referral 8/26/21    Journey Referral 6/21/24    Vein Mapping/US  09/26/24    Access Surgeon Referral Status  Referred    Dialysis Access Referral 07/15/24    Access Surgical Consult 08/12/24  Plan: Right radiocephalic AV fistula creation, Dr. Strickland. Needs PAC.    Diaylsis Access Type CVC    Dialysis Access Surgery 11/19/24    Dialysis Access Surgeon Dr. Strickland    Transplant Evaluation Referral 6/21/24    Transplant Status  Referred    Dialysis Optimal Start? No    Non-optimal Start Detail HD With CVC          Dialysis History        Start End Type Center Comments    9/18/2024  [facility] DIALYSIS (ESRD) Dr. Sarmiento, MWF 1:45 pm                  Patient is followed by Solid Organ Transplant 2/2 Kidney Transplant Evaluation - 9/30/2024.  Coordinator: Yolette Jimenez    ASSESSMENT:     Recent Labs      Latest Ref Rng & Units 9/30/2024 9/26/2024 9/16/2024   Neph Labs   Sodium 135 - 145 mmol/L  136  139  135    GFR Estimate >60 mL/min/1.73m2 10  15  9    Potassium 3.4 - 5.3 mmol/L 4.5  4.1  4.4    Creatinine 0.67 - 1.17 mg/dL 5.60  4.22  6.15    Magnesium 1.7 - 2.3 mg/dL  2.2     Urea Nitrogen 8.0 - 23.0 mg/dL 48.3  34.6  108.0    Hemoglobin 13.3 - 17.7 g/dL 10.1  9.8     9.8  10.1    Ferritin 31 - 409 ng/mL  241     Iron Binding Cap 240 - 430 ug/dL  222     Iron 61 - 157 ug/dL  38         Multiple values from one day are sorted in reverse-chronological order     Dialysis Access Assessments:  No assessment indicated    PLAN:     Future Appts Next 180 days       No appointments to display          Follow Up:    Request sent to cancel fistula creation surgery.    Maryellen Wilson RN  Dialysis Access Care Coordinator  Phone: [phone]  Pool: P_Dialysis_Access_Nurse

## 2024-10-14 ENCOUNTER — POST MORTEM DOCUMENTATION (OUTPATIENT)
Dept: TRANSPLANT | Facility: CLINIC | Age: 69
End: 2024-10-14
Payer: COMMERCIAL

## 2024-10-14 ENCOUNTER — DOCUMENTATION ONLY (OUTPATIENT)
Dept: TRANSPLANT | Facility: CLINIC | Age: 69
End: 2024-10-14
Payer: COMMERCIAL

## 2024-10-14 LAB
DONOR IDENTIFICATION: NORMAL
DONOR IDENTIFICATION: NORMAL
DQB5: 2183
DQB5: 2393
DQB9: 1814
DQB9: 1938
DSA COMMENTS: NORMAL
DSA COMMENTS: NORMAL
DSA PRESENT: YES
DSA PRESENT: YES
DSA TEST METHOD: NORMAL
DSA TEST METHOD: NORMAL
ORGAN: NORMAL
ORGAN: NORMAL
PROTOCOL CUTOFF: NORMAL
PROTOCOL CUTOFF: NORMAL
SA 1  COMMENTS: NORMAL
SA 1  COMMENTS: NORMAL
SA 1 CELL: NORMAL
SA 1 CELL: NORMAL
SA 1 TEST METHOD: NORMAL
SA 1 TEST METHOD: NORMAL
SA 2 CELL: NORMAL
SA 2 CELL: NORMAL
SA 2 COMMENTS: NORMAL
SA 2 COMMENTS: NORMAL
SA 2 TEST METHOD: NORMAL
SA 2 TEST METHOD: NORMAL
SA1 HI RISK ABY: NORMAL
SA1 HI RISK ABY: NORMAL
SA1 MOD RISK ABY: NORMAL
SA1 MOD RISK ABY: NORMAL
SA2 HI RISK ABY: NORMAL
SA2 HI RISK ABY: NORMAL
SA2 MOD RISK ABY: NORMAL
SA2 MOD RISK ABY: NORMAL
UNACCEPTABLE ANTIGENS: NORMAL
UNACCEPTABLE ANTIGENS: NORMAL
UNOS CPRA: 75
UNOS CPRA: 75

## 2024-10-14 NOTE — PROGRESS NOTES
Received notification on 10/14/2024 at 8:45 of patient's death from Care Everywhere.   Place of death was reported as [facility].  Graft status at the time of death was reported as Functioning.  Additional information: Per Care everywhere  BRIEF HOSPITAL COURSE:   Mr Jim Willingham is a 68 yo male with LVAD , heart transplant , ESRD, PE, who had an out-of-hospital cardiac arrest on 10/6. Patient intubated in the field and brought to ANW. Underwent TTM to 36 degrees. Neurology was consulted, given cooling. Prognostically very poor for significant neuro recovery. Neuro exam was poor and MRI showed anoxic brain injury. Care conference done 10/10, compassionately extubated and transitioned to comfort cares.   TIS verification is: Pending  The Transplant Office has been notified that patient is . The Post Mortem Encounter has been completed. Notifications have been sent to the Care team.   Primary TC to send sympathy care to family.     SALMA JEAN  Received notification of patient's death from Saray Davila RN.  Place of death was reported as [facility].  Graft status at the time of death was reported as Functioning  TIS verification is: Complete  T

## 2024-10-25 DIAGNOSIS — E78.5 HYPERLIPIDEMIA LDL GOAL <70: ICD-10-CM

## 2024-10-25 RX ORDER — ROSUVASTATIN CALCIUM 10 MG/1
10 TABLET, COATED ORAL DAILY
Qty: 100 TABLET | Refills: 2 | OUTPATIENT
Start: 2024-10-25

## 2024-10-28 NOTE — PROGRESS NOTES
Dialysis Access Service  Consult Note    Referred by Fiordaliza English for placement of peritoneal dialysis access.    HPI: Mr. 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  He is right handed. Mr. Willingham is dialyzing at [facility] (ESRD)  [address] [place] [phone].        Risk factors for complications of PD catheter access are:         Yes No  Hx of abdominal surgery  []   []    Comment:       Hx of  hernia repair/hydrocele []         []  Comment:  History of previous PD catheter []     []  Comment:     Respiratory problems   []     []  Comment:   Obesity (BMI >30)  []     []  Comment:  Diabetes    []        []  Comment:  Anticoagulation:   []         []   Agent:                                      Current immunosuppression []     []  Comment:         Past Medical History:   Diagnosis Date    Aspergillus pneumonia (H) 08/2021    A.fumigatus on BAL, treated with voriconazole x2-3 months    Aspiration pneumonia (H) 01/06/2024    Bariatric surgery status 2003    Benign essential hypertension 05/11/2017    Bilateral carpal tunnel syndrome 11/02/2020    BPH with obstruction/lower urinary tract symptoms 03/21/2024    Cardiomyopathy, unspecified (H) 05/08/2017    COPD (chronic obstructive pulmonary disease) (H) 11/02/2020    Depression 05/11/2017    Diabetes mellitus (H) 1995    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    ESRD (end stage renal disease) on dialysis (H) 07/02/2024    Generalized osteoarthritis 09/26/2023    Gouty arthropathy, chronic, without tophi 11/02/2020    H/O adenomatous polyp of colon 08/03/2016    2 polyps    H/O gastric bypass 05/11/2017    Heart replaced by transplant (H) 05/06/2021    Hyperlipidemia LDL goal <70 09/27/2022    ICD (implantable cardioverter-defibrillator), biventricular, in situ 05/11/2017    LVAD (left ventricular assist device) present (H)     Major depression, recurrent, chronic  (H) 11/02/2020    Mild anemia 03/18/2022    NICM (nonischemic cardiomyopathy) (H)/ EF 20% 05/11/2017    ECHO: LVEDd. 7.66 cm, Restrictive pattern , Severe mitral valve regurgitation    CECILIA (obstructive sleep apnea) 05/11/2017    Paroxysmal atrial fibrillation (H) 05/11/2017    Paroxysmal VT (H) 05/11/2017    Peripheral polyneuropathy 03/28/2023    Postsurgical dumping syndrome 03/21/2024    Pulmonary cavitary lesion 11/18/2021    Pulmonary embolism (H) 05/24/2024    Rotator cuff tear arthropathy of both shoulders 11/02/2020    Type 2 diabetes mellitus with diabetic polyneuropathy (H) 03/18/2022    Uncomplicated asthma     Vitamin B12 deficiency (non anemic) 05/11/2017       Past Surgical History:   Procedure Laterality Date    ANESTHESIA CARDIOVERSION N/A 05/11/2020    Procedure: ANESTHESIA, FOR CARDIOVERSION @1100;  Surgeon: GENERIC ANESTHESIA PROVIDER;  [facility]    BRONCHOSCOPY (RIGID OR FLEXIBLE), DIAGNOSTIC N/A 8/30/2021    Procedure: BRONCHOSCOPY, WITH BRONCHOALVEOLAR LAVAGE;  Surgeon: Perlman, David Morris, MD;  Location: [facility] GI    COLONOSCOPY N/A 4/18/2024    Procedure: COLONOSCOPY, WITH POLYPECTOMY;  Surgeon: Jermaine Root MD;  [facility] GI    CV CORONARY ANGIOGRAM N/A 5/17/2022    Procedure: Coronary Angiogram;  Surgeon: Jeffrey Gibson MD;  Location:  HEART CARDIAC CATH LAB    CV CORONARY ANGIOGRAM N/A 5/23/2023    Procedure: Coronary Angiogram;  Surgeon: Scout Robins MD;  Location:  HEART CARDIAC CATH LAB    CV HEART BIOPSY N/A 5/13/2021    Procedure: Heart Biopsy;  Surgeon: Scout Robins MD;  [facility]    CV HEART BIOPSY N/A 5/20/2021    Procedure: Heart Biopsy;  Surgeon: Jeffrey Gibson MD;  Location:  HEART CARDIAC CATH LAB    CV HEART BIOPSY N/A 5/27/2021    Procedure: Heart Biopsy;  Surgeon: Jac Dover MD;  Location:  HEART CARDIAC CATH LAB    CV HEART BIOPSY N/A 6/7/2021    Procedure: CV  HEART BIOPSY;  Surgeon: Jeffrey Gibson MD;  [facility]    CV HEART BIOPSY N/A 6/21/2021    Procedure: CV HEART BIOPSY;  Surgeon: Scout Robins MD;  [facility]    CV HEART BIOPSY N/A 7/5/2021    Procedure: CV HEART BIOPSY;  Surgeon: Jeffrey Gibson MD;  [facility]    CV HEART BIOPSY N/A 7/16/2021    Procedure: Heart Biopsy;  Surgeon: Amadeo Art MD;  [facility]    CV HEART BIOPSY N/A 8/5/2021    Procedure: Heart Biopsy;  Surgeon: Jeffrey Gibson MD;  Location:  HEART CARDIAC CATH LAB    CV HEART BIOPSY N/A 8/31/2021    Procedure: Heart Cath Heart Biopsy;  Surgeon: Jeffrey Gibson MD;  [facility]    CV HEART BIOPSY N/A 9/21/2021    Procedure: CV HEART BIOPSY;  Surgeon: Jeffrey Gibson MD;  Location:  HEART CARDIAC CATH LAB    CV HEART BIOPSY N/A 10/5/2021    Procedure: CV HEART BIOPSY;  Surgeon: Jeffrey Gibson MD;  Location:  HEART CARDIAC CATH LAB    CV HEART BIOPSY N/A 11/4/2021    Procedure: CV HEART BIOPSY;  Surgeon: Nicola Seth MD;  Location:  HEART CARDIAC CATH LAB    CV HEART BIOPSY N/A 5/17/2022    Procedure: Heart Biopsy;  Surgeon: Jeffrey Gibson MD;  [facility]    CV HEART BIOPSY N/A 5/23/2023    Procedure: Heart Biopsy;  Surgeon: Scout Robins MD;  Location:  HEART CARDIAC CATH LAB    CV HEART BIOPSY N/A 5/23/2024    Procedure: Heart Biopsy;  Surgeon: Precious Bautista MD;  Location:  HEART CARDIAC CATH LAB    CV RIGHT HEART CATH MEASUREMENTS RECORDED N/A 07/24/2019    Procedure: CV RIGHT HEART CATH;  Surgeon: Renu Sears MD;  Location: [facility] CARDIAC CATH LAB    CV RIGHT HEART CATH MEASUREMENTS RECORDED N/A 08/05/2020    Procedure: CV RIGHT HEART CATH;  Surgeon: Nicola Seth MD;  Location: [facility]  CARDIAC CATH LAB    CV RIGHT HEART CATH MEASUREMENTS RECORDED N/A 01/07/2021    Procedure: CV RIGHT HEART CATH;  Surgeon: Jac Dover MD;  [facility]    CV RIGHT HEART CATH MEASUREMENTS RECORDED N/A 02/23/2021    Procedure: Heart Cath Right Heart Cath;  Surgeon: Jeffrey Gibson MD;  Location:  HEART CARDIAC CATH LAB    CV RIGHT HEART CATH MEASUREMENTS RECORDED N/A 03/23/2021    Procedure: Heart Cath Right Heart Cath. request for 3/23;  Surgeon: Jeffrey Gibson MD;  Location:  HEART CARDIAC CATH LAB    CV RIGHT HEART CATH MEASUREMENTS RECORDED N/A 5/13/2021    Procedure: Right Heart Cath;  Surgeon: Scout Robins MD;  Location:  HEART CARDIAC CATH LAB    CV RIGHT HEART CATH MEASUREMENTS RECORDED N/A 5/20/2021    Procedure: Right Heart Cath;  Surgeon: Jeffrey Gibson MD;  Location:  HEART CARDIAC CATH LAB    CV RIGHT HEART CATH MEASUREMENTS RECORDED N/A 5/27/2021    Procedure: Right Heart Cath;  Surgeon: Jac Dover MD;  Location:  HEART CARDIAC CATH LAB    CV RIGHT HEART CATH MEASUREMENTS RECORDED N/A 6/7/2021    Procedure: CV RIGHT HEART CATH;  Surgeon: Jeffrey Gibson MD;  Location:  HEART CARDIAC CATH LAB    CV RIGHT HEART CATH MEASUREMENTS RECORDED N/A 6/21/2021    Procedure: CV RIGHT HEART CATH;  Surgeon: Scout Robins MD;  Location:  HEART CARDIAC CATH LAB    CV RIGHT HEART CATH MEASUREMENTS RECORDED N/A 7/5/2021    Procedure: CV RIGHT HEART CATH;  Surgeon: Jeffrey Gibson MD;  Location:  HEART CARDIAC CATH LAB    CV RIGHT HEART CATH MEASUREMENTS RECORDED N/A 7/16/2021    Procedure: Right Heart Cath;  Surgeon: Amadeo Art MD;  Location:  HEART CARDIAC CATH LAB    CV RIGHT HEART CATH MEASUREMENTS RECORDED N/A 8/5/2021    Procedure: CV RIGHT HEART CATH;  Surgeon: Jeffrey Gibson MD;  [facility] C[facility]AC [facility] LAB    CV RIGHT HEART CATH  MEASUREMENTS RECORDED N/A 8/31/2021    Procedure: Heart Cath Right Heart Cath;  Surgeon: Jeffrey Gibson MD;  Location:  HEART CARDIAC CATH LAB    CV RIGHT HEART CATH MEASUREMENTS RECORDED N/A 9/21/2021    Procedure: CV RIGHT HEART CATH;  Surgeon: Jeffrey Gibson MD;  Location:  HEART CARDIAC CATH LAB    CV RIGHT HEART CATH MEASUREMENTS RECORDED N/A 10/5/2021    Procedure: CV RIGHT HEART CATH;  Surgeon: Jeffrey Gibson MD;  Location:  HEART CARDIAC CATH LAB    CV RIGHT HEART CATH MEASUREMENTS RECORDED N/A 11/4/2021    Procedure: CV RIGHT HEART CATH;  Surgeon: Nicola Seth MD;  Location:  HEART CARDIAC CATH LAB    CV RIGHT HEART CATH MEASUREMENTS RECORDED N/A 5/17/2022    Procedure: Right Heart Catheterization;  Surgeon: Jeffrey Gibson MD;  Location:  HEART CARDIAC CATH LAB    CV RIGHT HEART CATH MEASUREMENTS RECORDED N/A 5/23/2023    Procedure: Right Heart Catheterization;  Surgeon: Scout Robins MD;  Location:  HEART CARDIAC CATH LAB    CV RIGHT HEART CATH MEASUREMENTS RECORDED N/A 5/23/2024    Procedure: Right Heart Cath- pre noon with rush path;  Surgeon: Precious Bautista MD;  Location:  HEART CARDIAC CATH LAB    GI SURGERY  2003    [facility]    INSERT VENTRICULAR ASSIST DEVICE LEFT (HEARTMATE II) N/A 06/19/2017    Procedure: INSERT VENTRICULAR ASSIST DEVICE LEFT (HEARTMATE II);  Median Sternotomy Heartmate II Left Ventricular Assist Device Insertion on Pump Oxygenator;  Surgeon: Ronnie Quigley MD;  [facility]    IR CHEST TUBE PLACEMENT NON-TUNNELED RIGHT  7/11/2021    IR CVC TUNNEL PLACEMENT > 5 YRS OF AGE  9/13/2024    LAPAROSCOPY DIAGNOSTIC (GENERAL) N/A 1/4/2024    Procedure: Diagnostic Laparoscopy, Exploratory Laparotomy with Extensive lysis of adhesions.;  Surgeon: Frederick Montgomery MD;  [facility]    ORTHOPEDIC SURGERY  1994    right knee wired    PICC DOUBLE LUMEN PLACEMENT Right 09/23/2020    5FR PICC DL.  Length-43cm (1cm out).    PICC INSERTION - DOUBLE LUMEN Right 2021    IK/BRACH    RELEASE CARPAL TUNNEL BILATERAL Bilateral 2021    Procedure: Bilateral carpal tunnel release;  Surgeon: Jermaine Brand MD;  [facility]: [facility] OR    TRANSPLANT HEART RECIPIENT N/A 2021    Procedure: Redo median sternotomy, lysis of adhesions, heart transplant recipient, on cardiopulmonary bypass, intraoperative transesophageal echocardiogram per anesthesia, Implantable Cardioverter Defibrillator (ICD) removal;  Surgeon: Ronnie Quigley MD;  [facility]       Family History   Problem Relation Age of Onset    Cerebrovascular Disease Mother 64    Diabetes Mother     Hypertension Mother     Coronary Artery Disease Father     Diabetes Type 2  Father     Obesity Brother     Obesity Brother     Kidney failure Maternal Grandmother     Kidney failure Maternal Grandfather     Dementia Paternal Grandmother     Cerebrovascular Disease Daughter 40       Social History     Tobacco Use    Smoking status: Former     Current packs/day: 0.00     Types: Cigarettes     Quit date:      Years since quittin.8     Passive exposure: Never    Smokeless tobacco: Never   Substance Use Topics    Alcohol use: Yes     Comment: on occasion         ROS: 10 point ROS neg other than the symptoms noted above in the HPI.                 Current Outpatient Medications:     acetaminophen (TYLENOL) 325 MG tablet, Take 3 tablets (975 mg) by mouth every 8 hours as needed for mild pain, Disp: 100 tablet, Rfl: 0    buPROPion (WELLBUTRIN XL) 300 MG 24 hr tablet, TAKE 1 TABLET BY MOUTH IN THE  MORNING, Disp: 90 tablet, Rfl: 3    cyanocobalamin (VITAMIN B-12) 1000 MCG tablet, Take 1 tablet (1,000 mcg) by mouth daily, Disp: 90 tablet, Rfl: 3    diphenhydrAMINE (BENADRYL) 25 MG tablet, Take 25 mg by mouth every 6 hours as needed for itching, Disp: , Rfl:     gabapentin (NEURONTIN) 300 MG capsule, Take 300 mg by mouth daily, Disp: , Rfl:     ipratropium -  albuterol 0.5 mg/2.5 mg/3 mL (DUONEB) 0.5-2.5 (3) MG/3ML neb solution, Take 1 vial by nebulization 4 times daily as needed for shortness of breath, wheezing or cough, Disp: , Rfl:     Melatonin 10 MG CAPS, Take 1 capsule by mouth nightly as needed, Disp: , Rfl:     montelukast (SINGULAIR) 10 MG tablet, TAKE 1 TABLET BY MOUTH AT  BEDTIME, Disp: 100 tablet, Rfl: 3    mycophenolate (GENERIC EQUIVALENT) 250 MG capsule, Take 2 capsules (500 mg) by mouth 2 times daily, Disp: 180 capsule, Rfl: 3    polyethylene glycol (MIRALAX) 17 GM/Dose powder, Take 1 Capful by mouth daily as needed for constipation, Disp: , Rfl:     tacrolimus (GENERIC EQUIVALENT) 0.5 MG capsule, Take 1 capsule (0.5 mg) by mouth every morning With 3 mg capsules, Disp: 90 capsule, Rfl: 3    tacrolimus (GENERIC EQUIVALENT) 1 MG capsule, TAKE 3 CAPSULES BY MOUTh IN am and in pm., Disp: 180 capsule, Rfl: 3    albuterol (PROAIR HFA/PROVENTIL HFA/VENTOLIN HFA) 108 (90 Base) MCG/ACT inhaler, INHALE 2 INHALATIONS BY MOUTH  EVERY 4 HOURS AS NEEDED FOR  SHORTNESS OF BREATH ,WHEEZING OR COUGH, Disp: 54 g, Rfl: 1    allopurinol (ZYLOPRIM) 300 MG tablet, TAKE 1 TABLET BY MOUTH DAILY, Disp: 100 tablet, Rfl: 2    bumetanide (BUMEX) 1 MG tablet, Take 2 tablets (2 mg) by mouth 2 times daily. Do not take 8/28/24. Restart at 2mg twice a day on 8/29/24., Disp: 540 tablet, Rfl: 3    calcium carbonate (TUMS) 500 MG chewable tablet, Take 1 tablet (500 mg) by mouth 2 times daily., Disp: , Rfl:     rosuvastatin (CRESTOR) 10 MG tablet, TAKE 1 TABLET BY MOUTH DAILY, Disp: 90 tablet, Rfl: 1    warfarin ANTICOAGULANT (COUMADIN) 2.5 MG tablet, Take 1-1.5 tablets daily or as directed, Disp: 135 tablet, Rfl: 1    warfarin ANTICOAGULANT (COUMADIN) 2.5 MG tablet, Please take 5mg of Warfarin 8/28/2024 and 3.75 mg on 8/29/24. You should then follow dosing recommendations from anticoagulation clinic based on your INR on 8/30/24, Disp: , Rfl:                   PHYSICAL EXAM:      Constitutional: Alert and oriented in no acute distress  HEENT: sclera anicteric, MMM, conjunctiva pink  Chest: HD catheter present on the right side.  CV:  NSR  : No CVA tenderness  Abdomen: prior incisions well healed, no ascites no hernia.   Beltline location in relation to umbilicus: at hips, below  Extremities:  1+ and bilateral. Palpable radial pulses and brachial pulses bilaterally, no obvious suitable veins for AV access  Skin:  No rashes or jaundice  Neuro: normal gait  Psych: normal mood and affect    Lab on 08/05/2024   Component Date Value Ref Range Status    INR 08/05/2024 2.27 (H)  0.85 - 1.15 Final    Sodium 08/05/2024 139  135 - 145 mmol/L Final    Potassium 08/05/2024 4.1  3.4 - 5.3 mmol/L Final    Chloride 08/05/2024 103  98 - 107 mmol/L Final    Carbon Dioxide (CO2) 08/05/2024 22  22 - 29 mmol/L Final    Anion Gap 08/05/2024 14  7 - 15 mmol/L Final    Urea Nitrogen 08/05/2024 78.3 (H)  8.0 - 23.0 mg/dL Final    Creatinine 08/05/2024 3.81 (H)  0.67 - 1.17 mg/dL Final    GFR Estimate 08/05/2024 17 (L)  >60 mL/min/1.73m2 Final    eGFR calculated using 2021 CKD-EPI equation.    Calcium 08/05/2024 8.4 (L)  8.8 - 10.4 mg/dL Final    Reference intervals for this test were updated on 7/16/2024 to reflect our healthy population more accurately. There may be differences in the flagging of prior results with similar values performed with this method. Those prior results can be interpreted in the context of the updated reference intervals.    Glucose 08/05/2024 100 (H)  70 - 99 mg/dL Final            Assessment & Plan: Mr. Willingham is a fair candidate for peritoneal  dialysis access.  I would recommend laparoscopic PD catheter placement with left sided exit, created under General. He may have a hostile abdomen given his prior adhesiolysis, in which case we should proceed with AVF/AVG creation. He should obtain mapping prior to surgery to determine a suitable target.     The surgical risks and benefits were  reviewed and questions were answered. We discussed the day of surgery plan, anesthesia, postop care, risk of infection, bleeding, thrombosis, possible need for intraoperative omentectomy or newly detected hernia repair or obliteration of patent processus vaginalis (if male), drain pain or catheter dysfunction, and possible future need for surgical revision or removal. This was contrasted with morbidity and mortality risk of long-term catheter based hemodialysis access. The patient does not wish to proceed with surgery for permanent access creation at this time.  The patient was counselled to contact one of the nurse coordinators, Neli Mason RN or Maryellen Wilson RN at [phone] with any questions or concerns.  Thank you for the opportunity to participate in Mr. Willingham's care.        Jim Strickland MD

## 2024-11-20 NOTE — Clinical Note
Restorative Technician Note      Patient Name: Carley Bonilla     Restorative Tech Visit Date: 11/20/24  Note Type: Mobility  Patient Position Upon Consult: Bedside chair  Activity Performed: Ambulated  Assistive Device: Roller walker  Patient Position at End of Consult: All needs within reach; Bedside chair               There were no immediate complications during the procedure.

## 2024-11-27 NOTE — PROGRESS NOTES
ANTICOAGULATION FOLLOW-UP CLINIC VISIT    Patient Name:  Jim Willingham  Date:  11/15/2019  Contact Type:  Telephone    SUBJECTIVE:  Patient Findings     Comments:   Jim reports he still has cold symptoms, but he is not taking Airborne anymore.  He will plan to eat some greens this week--but not go overboard.  He ate several servings of greens this week because his INR was supra therapeutic..  He knows to try and keep greens consistent.  His INR has not been stable, will recheck INR 19.          Clinical Outcomes     Comments:   Jim reports he still has cold symptoms, but he is not taking Airborne anymore.  He will plan to eat some greens this week--but not go overboard.  He ate several servings of greens this week because his INR was supra therapeutic..  He knows to try and keep greens consistent.  His INR has not been stable, will recheck INR 19.             OBJECTIVE    INR   Date Value Ref Range Status   11/15/2019 2.55 (H) 0.86 - 1.14 Final       ASSESSMENT / PLAN  INR assessment THER    Recheck INR In: 3 DAYS    INR Location Clinic      Anticoagulation Summary  As of 11/15/2019    INR goal:   2.0-3.0   TTR:   77.9 % (2.4 y)   INR used for dosin.55 (11/15/2019)   Warfarin maintenance plan:   7.5 mg (5 mg x 1.5) every Tue, Sat; 10 mg (5 mg x 2) all other days   Full warfarin instructions:   : 10 mg; : 7.5 mg; Otherwise 7.5 mg every Tue, Sat; 10 mg all other days   Weekly warfarin total:   65 mg   Plan last modified:   Suyapa Crawford RN (10/31/2019)   Next INR check:   2019   Priority:   Critical   Target end date:       Indications    LVAD (left ventricular assist device) present (H) [Z95.811]  Long-term (current) use of anticoagulants [Z79.01] [Z79.01]             Anticoagulation Episode Summary     INR check location:       Preferred lab:       Send INR reminders to:   [facility]    Comments:   HIPPA Forms mailed 17  Labs drawn either at [facility] or  [facility]  LVAD placed 6/19/17   II  ASA 81 mg daily      Anticoagulation Care Providers     Provider Role Specialty Phone number    Delisa Montgomery MD Responsible Cardiology [phone]            See the Encounter Report to view Anticoagulation Flowsheet and Dosing Calendar (Go to Encounters tab in chart review, and find the Anticoagulation Therapy Visit)    Spoke with Jim.  See patient findings.    Patient had LVAD placed on:   6/19/17  Type of LVAD: HM 2  Patient's current Aspirin dose: 81 mg daily  LVAD Protocol followed: Yes      Kirsty Bermudez RN                  Jason Ward (Attending) <<----- Click to Select Surgeon

## 2024-12-29 NOTE — COMMITTEE REVIEW
Thoracic Committee Review Note     Evaluation Date: 9/15/2020  Committee Review Date: 1/29/2021    Organ being evaluated for: Heart    Transplant Phase: Evaluation  Transplant Status: Active    Transplant Coordinator: Bre Cárdenas  Transplant MD:  Delisa Montgomery      Referring Physician: Chase Molina    Primary Diagnosis: NICM      Committee Review Members:  Advanced Heart Failure and Transplant Cardiology Riaz Montaño MD   Cardiology Stephens Melonie Silver, REGINALD, Nba Dorman, Jesús Francisco, RN, FERNANDO MATA, REGINALD, Rajani Abdi, REGINALD, Abiola Cordon, RN   Nurse Practitioner - Adult Health Rajani Wilder, APRN CNP   Nutrition Nuris Granger, RD   Pharmacist Neeraj Rush, [facility]    - Clinical Clara Casiano [facility], Kadie Pedro, [facility]   Transplant Rose Conte MD, Karthik Nguyen MD, Precious Bautista MD, Nabor Best MD, MD, Margarita Arellano, REGINALD, Evon Duarte RN, Ronnie Quigley MD, Delisa Montgomery MD, Juan M Mike MD, Nicola Seth MD, Renu Sears MD   PFR, Suyapa Whipple    Transplant Eligibility: Disabling heart disease on maximal medical therapy    Committee Review Decision: Needs Re-presentation    Relative Contraindications: Other, See notes for further follow up needed    Absolute Contraindications: None    Committee Chair Rose Conte MD verbally attested to the committee's decision.    Committee Discussion Details:         1-29-21 Team Meeting:   (See PP Eval slides.)  66 yo, ABO=O, s/p VAD in 2017, DT due to weight.  Did well initially, but over last 3 months had bacteremia, atrial arrhythmias è worsening HF, VT, hospitalized recently for AHF.  Creatinine now going back up, lung function not great, feels exhausted.  RHC in January:  W - 25, PA- 30.  No AI, LV dilated.    Neuropsych - seen a copule of yrs ago by Dr Whitehead - no concerns.  SW - wife  will be caregiver, but they have a strained relationship.  Discussion:  Lung function  not good, FEV1, FVC in the 30s at the time of VAD, but has done well post-op, FEV1 now up to 40%, FVC 60%.  Osteopenia, no cirrhosis on abdom US.  ABIs with abnormal flow, likely due to VAD, very little calcifications or obstruction of flow.  Has worked hard to lose weight.     Plan - higher risk, but not enough to deny Tx.  Suggest 6 minute walk to assess current function.  Will repeat chest CT to re-assess nodules.  ABO=O, not a small man.  Would qualify for a Status 3 due to bacteremia.  Could ask for Status 2 with Exception due to multiple issues - renal function, lung function, bacteremia, etc.  Plan admit for HF tune-up.  National stats show that over half of Status 2 pts are Exceptions.     Imaging Studies/Medications

## 2025-02-16 NOTE — PROGRESS NOTES
[facility]   Cardiology II Service / Advanced Heart Failure  Daily Progress Note  Date of Service: 3/9/2021      Patient: Jim Willingham  MRN: [id number]  Admission Date: 2/8/2021  Hospital Day # 35    Assessment and Plan:  Jim Willingham is a 65 year old male with history of NICM EF 20% c/b VT s/p CRT-D s/p HMII LVAD 6/19/2017 originally intended as destination therapy 2/2 obesity however with recent weight loss now candidate for transplantation. He is admitted for worsening functional status and renal function concerning for worsening heart failure. He is now listed status 2E for transplant, blood group O. Note: exception is due 3/24.    Today's Plan:  -skip one dose Bumex, resume BID tomorrow  -extra 20 meq kcl    # Chronic systolic heart failure secondary to NCM (LVEF < 30% per TTE 1/2021)  # S/p HMII LVAD (6/2017)  # Listed status 2E for transplant due to multiple LVAD complications  Stage D, NYHA Class III. TTE 1/8/21 at 9400rpm, EF<30%m LVIDd 6.8cm, at least mildly reduced RV function, AoV closed without AI, mild-mod eccentric MR, dilated IVC without collapse. RHC 2/23 RA 7 PA 26/8(15) PCWP 6 Kee C/CO 4.6/2.2 Td CO/CI 5.5/2.6     - Fluid status: euvolemic, weight below EDW and increased PI events, will skip one dose Bumex, then resume 4mg po bid, received metolazone 2.5 mg once 3/13  - ACEi/ARB/ARNi: deferred due to renal function  - Afterload reduction: hydralazine 50mg TID and isordil 10mg TID  - BB: contraindicated due to low cardiac output  - Aldosterone antagonist: deferred due to renal function  - SCD ppx: CRT-d  - Antiplatelet: aspirin 81mg daily  - Anticoagulation: warfarin, dosed per pharmacy, goal INR 2.5-3. INR today 2.7  - MAPs: 70-80s  - LDH:  248 (3/7)    # VT  # AFib  Currently in SR  - continue amiodarone 200mg daily  - BB deferred as above  - warfarin as above    # WALTER on CKD III, resolved  Cr 2s on admission, improved and stabilized ~1.5.   - continue diuretic as above  -  daily BMP for now    # DMII  # Low cortisol levels  # Concern for hypoglycemia, resolved  # H/o bariatric surgery [facility]  HgbA1c 1/2021 6.2%.  Has been on prednisone for management of carpal tunnel syndrome for about 7 years, tapered down from 9/2020 and ultimately stopped 2/2021.  Recent symptomatic hypoglycemia (BGs 50s on 3/4), so endocrine was consulted.  C-peptide was 13.9, proinsulin was 18.4 (3/4, he was normoglycemic with post-prandial glucose 84 at that time).  He has not had any further hypoglycemia, bur endocrine sought to r/o adrenal insufficiency. Serum cortisol levels have been low, so he underwent a cosyntropin stimulation test today.  - Endocrine consulted, signed off   - Cosyntropin stimulation test normal, ruling out adrenal insufficiency  - BID and HS BS  - Per endo --> Fingerstick glucose might not be accurate for hypoglycemia, if patient has glucose <55 (without insulin), plasma glucose should be sent for confirmation of hypoglycemia prior to correction. While the patient is off insulin therapy, POC glucose readings above 60 are acceptable for the patient    # Acute gout flare, resolved  # Hx polyarticular gout flares  Pain in L heal more acute starting 3/9, similar to previous gout attacks per pt. Anakinra x 5 days with resolution in February. Per Rheumatology: Could consider re-dosing Anakinra if he develops a subsequent flare during hospital stay. Started Anakinra again 3/9 for heel pain, now resolved. Uric acid was normal.   - continue allopurinol  - rheum consulted   - subcutaneus Anakinra 100 mg daily x5 days (last dose Don 3/14), can do daily for ppx per rheum if needed    # Upper respiratory congestion/possible URI, resolved  # Rhinorrhea, resolved  Afebrile, WBC's normal. Feels like it could be allergies per pt, history of bacterial URI's in the past. Completed 5 day course of Cefdinir last month. COVID negative 3/7. Resp PCR + rhinovirus 2/12, CXR 2/12 with no overt  "PNA. Stopped cefdinir 2/15. Repeat RVP-3/5 per transplant ID, negative. Reporting decrease in congestion and rhinorrhea since change from Claritin to Zyrtec.  - EOD CBC  - continue Zyrtec 10 mg daily  - monitor symptoms    # Bilateral shoulder pain: Hx of OAPer pt has had recommendation in the past for shoulder arthroscopy. Increased discomfort 3/12.  - bilateral 4% lidocaine patches to shoulders 12 h daily  - encourage offloading shoulders -- OOB activity or laying supine.     OTHER:  # Carpel tunnel, bilateral s/p bilateral release: Evidence of thenar atrophy, s/p steroid injection 11/2020.  S/p bilateral release 2/18/21. Appreciate ortho/plastics consults, signed off. Continue gabapentin 300mg/600mg/600mg and tylenol as needed.  # COPD/asthma:  Continue PTA breo and albuterol as needed.  # Rhinovirus, resolved:  Repeat respiratory virus panel was negative.  # Depression:  Continue PTA buproprion.  # Hx Staph Hominis Bacteremia:  No need for surveillance blood cx per transplant ID.      FEN: 3g Na diet  PROPHY:  Ambulate, warfarin  LINES:  PIV  DISPO:  Pending, currently listed for heart transplantation  CODE STATUS:  full    =============================================================    Interval History/ROS:   No acute overnight events. Feeling really well with weight down. Improved exercise tolerance. Weight at lisa but increased PI events. Heel pain is resolved. No new concerns.     Last 24 hr care team notes reviewed.   ROS: 4 point ROS including Respiratory, CV, GI and , other than that noted in the HPI, is negative.     Medications: Reviewed in EPIC.     Physical Exam:   BP 91/76 (BP Location: Left arm)   Pulse 62   Temp 98  F (36.7  C) (Oral)   Resp 16   Ht 1.727 m (5' 8\")   Wt 96.7 kg (213 lb 3.2 oz)   SpO2 95%   BMI 32.42 kg/m       GENERAL: Appears comfortable, in no distress.  HEENT: Eye symmetrical, no discharge or icterus bilaterally. Mucous membranes moist and without lesions.  NECK: " Supple, JVD not visible at 75 degrees.   CV: +mechanical LVAD hum.  RESPIRATORY: Respirations regular, even, and unlabored. Lungs CTA throughout.    GI: Moderately distended with normoactive bowel sounds present in all quadrants. No tenderness, rebound, guarding.   EXTREMITIES: Trace BLE peripheral edema. Non pulsatile.   NEUROLOGIC: Alert and oriented x 3. No focal deficits.   MUSCULOSKELETAL: No joint swelling or tenderness.   SKIN: No jaundice. No rashes or lesions.     Data:  CMP  Recent Labs   Lab 03/15/21  0555 03/14/21  0610 03/13/21  0551 03/12/21  0605    137 138 138   POTASSIUM 3.3* 3.6 3.7 3.8   CHLORIDE 98 103 105 105   CO2 29 28 26 27   ANIONGAP 9 6 8 5   * 101* 90 98   BUN 47* 42* 40* 34*   CR 1.67* 1.66* 1.52* 1.44*   GFRESTIMATED 43* 43* 48* 51*   GFRESTBLACK 49* 50* 55* 59*   QAMAR 8.9 8.6 8.4* 8.6   MAG 2.5* 2.6* 2.6* 2.5*   PROTTOTAL 6.5*  --   --   --    ALBUMIN 3.4  --   --   --    BILITOTAL 0.6  --   --   --    ALKPHOS 102  --   --   --    AST 35  --   --   --    ALT 37  --   --   --      CBC  Recent Labs   Lab 03/15/21  0555 03/13/21  0551 03/11/21  0525 03/09/21  0518   WBC 4.4 4.0 4.2 4.7   RBC 3.94* 3.63* 3.88* 3.71*   HGB 11.6* 10.4* 11.3* 10.4*   HCT 37.0* 34.0* 36.4* 34.6*   MCV 94 94 94 93   MCH 29.4 28.7 29.1 28.0   MCHC 31.4* 30.6* 31.0* 30.1*   RDW 17.8* 18.2* 18.4* 18.9*    185 203 206     INR  Recent Labs   Lab 03/15/21  0555 03/14/21  0610 03/13/21  0551 03/12/21  0605   INR 2.76* 2.89* 2.38* 2.28*     Time/Communication  I personally spent a total of 25 minutes. Of that 15 minutes was counseling/coordination of patient's care. Plan of care discussed with patient. See my note above for details.    Patient discussed with Dr. Mccarty.                     Present, unchanged

## 2025-04-23 NOTE — PROGRESS NOTES
20 ADDENDUM  Spoke to Jim.  He will go into the lab on Thursday.  Updated calendar.  [place]        ANTICOAGULATION FOLLOW-UP CLINIC VISIT    Patient Name:  Jim Willingham  Date:  2020  Contact Type:  Telephone    SUBJECTIVE:         OBJECTIVE    Recent labs: (last 7 days)     20  1156   INR 2.13*       ASSESSMENT / PLAN  INR assessment THER    Recheck INR In: 1 WEEK    INR Location Clinic      Anticoagulation Summary  As of 2020    INR goal:   2.0-3.0   TTR:   65.6 % (11.7 mo)   INR used for dosin.13 (2020)   Warfarin maintenance plan:   10 mg (5 mg x 2) every Fri; 7.5 mg (5 mg x 1.5) all other days   Full warfarin instructions:   : 10 mg; : 10 mg; Otherwise 10 mg every Fri; 7.5 mg all other days   Weekly warfarin total:   55 mg   Plan last modified:   Delisa Hillman RN (3/17/2020)   Next INR check:   2020   Priority:   Critical   Target end date:   Indefinite    Indications    LVAD (left ventricular assist device) present (H) [Z95.811]  Long-term (current) use of anticoagulants [Z79.01] [Z79.01]  Chronic systolic congestive heart failure (H) [I50.22]             Anticoagulation Episode Summary     INR check location:       Preferred lab:       Send INR reminders to:   [facility]    Comments:   HIPPA Forms mailed 17  Labs drawn either at [facility] or [facility], See 3/5/20 ADDENDUM, pt. weekly INR >2.0 for Cardioversion  LVAD placed 17  HM II  ASA 81 mg daily      Anticoagulation Care Providers     Provider Role Specialty Phone number    Delisa Montgomery MD Referring Cardiology [phone]            See the Encounter Report to view Anticoagulation Flowsheet and Dosing Calendar (Go to Encounters tab in chart review, and find the Anticoagulation Therapy Visit)    Spoke with patient.  Patient had LVAD placed on:   17  Type of LVAD: HM2  Patient's current Aspirin dose: 81mg  LVAD Protocol followed:  Yes  Delisa Hillman RN      Attending Attestation (For Attendings USE Only)...

## 2025-05-07 NOTE — MR AVS SNAPSHOT
Jim Willingham   11/9/2018   Anticoagulation Therapy Visit    Description:  63 year old male   Provider:  Brendan Montoya, RN   Department:  [facility]           INR as of 11/9/2018     Today's INR 2.17      Anticoagulation Summary as of 11/9/2018     INR goal 2.0-3.0   Today's INR 2.17   Full warfarin instructions 10 mg on Fri; 7.5 mg all other days   Next INR check 11/20/2018    Indications   LVAD (left ventricular assist device) present (H) [Z95.811]  Long-term (current) use of anticoagulants [Z79.01] [Z79.01]         November 2018 Details    Sun Mon Tue Wed Thu Fri Sat         1               2               3                 4               5               6               7               8               9      10 mg   See details      10      7.5 mg           11      7.5 mg         12      7.5 mg         13      7.5 mg         14      7.5 mg         15      7.5 mg         16      10 mg         17      7.5 mg           18      7.5 mg         19      7.5 mg         20            21               22               23               24                 25               26               27               28               29               30                 Date Details   11/09 This INR check       Date of next INR:  11/20/2018         How to take your warfarin dose     To take:  7.5 mg Take 1.5 of the 5 mg tablets.    To take:  10 mg Take 2 of the 5 mg tablets.           
Resident

## 2025-07-21 NOTE — PROGRESS NOTES
Emergency Department Discharge Information for Justin Anderson was seen in the Emergency Department for a cut on his chin and inside his mouth.     We have repaired his cut using stitches that should fall out on their own. He has 2 stitches.    The cut inside his lip should heal well on its own.     Home care  Keep the wound clean and dry for 24 hours. After that, you can wash it gently with soap and water. Avoid soaking the wound.   Put bacitracin or another antibiotic ointment on the wound 2 times a day. This will help keep the stitches from sticking and prevent infection.   If the stitches haven t started coming out after 5 days, you can put a warm, wet washcloth on the stitches for a few minutes a few times a day. Then, gently rub the stitches to help them come out.   When the wound has healed, use sunscreen on it every time he will be in the sun for the next year or so. This will help the scar fade.     Medicines  For fever or pain, Justin may have:    Acetaminophen (Tylenol) every 4 to 6 hours as needed (up to 5 doses in 24 hours). His  dose is: 5 ml (160 mg) of the infant's or children's liquid               (10.9-16.3 kg/24-35 lb)    Or    Ibuprofen (Advil, Motrin) every 6 hours as needed.  His dose is: 5 ml (100 mg) of the children's (not infant's) liquid                                               (10-15 kg/22-33 lb)    If necessary, it is safe to give both Tylenol and ibuprofen, as long as you are careful not to give Tylenol more than every 4 hours and ibuprofen more than every 6 hours.    These doses are based on your child s weight. If you have a prescription for these medicines, the dose may be a little different. Either dose is safe. If you have questions, ask a doctor or pharmacist.     Justin did not require a tetanus booster vaccine (TD or TDaP) today.    When to get help  Please return to the ED or contact his regular clinic if the stitches don t come out after 7 days or if:    he feels much  Status 2 Heart Extension Complete  04/23/2021  Listing Criteria:  Exception     Dr. Montgomery approved the following statement prior to the submission of Status 2 Exception form in [employer]:     65 M with NICM who had a HM3 implanted on 6/19/2017 at destination therapy (obesity). He initially thrived after surgery with significant functional improvement. Over the last 10 months, however, his functional status has deteriorated.   He has developed worsening heart failure symptoms associated with significant weight loss (50 lbs over the last year) with visible muscle wasting and worsening cardiorenal syndrome  (baseline cr 1.4 now close to 2.0). Last hemodynamics: RA18, PCWP 25, CI 2.0. While  hemodynamics improved with increased speed and increased afterload reduction, he continues to have extremely high diuretic requirements to maintain acceptable volume status which has resulted in worsening renal function on oral diuretics (note, no AI on echo and normal inflow and outflow velocities).   In addition to his poor hemodynamics, he has developed several other significant LVAD complications.  He developed recurrent bacteremia (staph hominis) requiring prolonged IV antibiotics (last positive culture 9/2020).  He also recently had several episodes of sustained VT and finally therapy for VF on 12/11/2020.   As he is a large blood group O, we are asking for status 2 E given multiple LVAD complications and risk of losing his window of candidacy for heart transplantation.     Status 2 Extension due 05/08/2021   worse  he has a fever over 102  he has pus or blood leaking from the wound  the wound comes apart  the wound becomes very red, swollen, or painful OR  the area past the wound becomes very swollen, painful, or numb    Call if you have any other concerns.      If the stitches don t fall out after 7 days, please make an appointment with his regular clinic to have them removed.

## (undated) DEVICE — CLIP HORIZON MED BLUE 002200

## (undated) DEVICE — DRAPE STOCKINETTE IMPERVIOUS 10" 21048

## (undated) DEVICE — DRAPE IOBAN INCISE 23X17" 6650EZ

## (undated) DEVICE — SU VICRYL 0 CTX 36" J370H

## (undated) DEVICE — PREP CHLORAPREP 26ML TINTED ORANGE  260815

## (undated) DEVICE — SU ETHIBOND 2-0 MHDA 36" X843H

## (undated) DEVICE — BULB SYRINGE

## (undated) DEVICE — GLOVE BIOGEL PI MICRO INDICATOR UNDERGLOVE SZ 7.5 48975

## (undated) DEVICE — WIPES FOLEY CARE SURESTEP PROVON DFC100

## (undated) DEVICE — FORCEP ENDOMYOCARDIAL BIOPSY STRAIGHT 6FRX50CM 190060

## (undated) DEVICE — LINEN TOWEL PACK X6 WHITE 5487

## (undated) DEVICE — INTRO SHEATH 7FRX10CM PINNACLE RSS702

## (undated) DEVICE — DRSG TEGADERM 8X12" 1629

## (undated) DEVICE — DRAPE WARMER 66X44" ORS-300

## (undated) DEVICE — CATH TRAY FOLEY SURESTEP 16FR W/URNE MTR STLK LATEX A303316A

## (undated) DEVICE — CONNECTOR DRAIN CHEST Y EXTENSION SET 19909

## (undated) DEVICE — SPONGE RAY-TEC 4X8" 7318

## (undated) DEVICE — INTRO GLIDESHEATH SLENDER 6FR 10X45CM 60-1060

## (undated) DEVICE — SU STEEL 6 CCS 4X18" M654G

## (undated) DEVICE — ESU FCP BIPOLAR NONSTICK STR 4"X0.4MM W/CORD 19-3002AU

## (undated) DEVICE — SU SILK 3-0 TIE 12X30" A304H

## (undated) DEVICE — SOL NACL 0.9% IRRIG 1000ML BOTTLE 2F7124

## (undated) DEVICE — PACK ADULT HEART UMMC PV15CG92D

## (undated) DEVICE — BLADE KNIFE SURG 10 371110

## (undated) DEVICE — SLEEVE REPOSITIONING W/CATH LOCK 60CM 406503

## (undated) DEVICE — Device

## (undated) DEVICE — SU PLEDGET SOFT TFE 3/8"X3/26"X1/16" PCP40

## (undated) DEVICE — SU STEEL MYO/WIRE II STERNOTOMY 8 BE-1 3X14" 048-217

## (undated) DEVICE — PAD CHUX UNDERPAD 23X24" 7136

## (undated) DEVICE — SU PROLENE 4-0 RB-1DA 36" 8557H

## (undated) DEVICE — CONNECTOR BLAKE DRAIN SGL BCC1

## (undated) DEVICE — LIGHT HANDLE X1 31140133

## (undated) DEVICE — BLADE SAW STERNAL 20X30MM KM-32

## (undated) DEVICE — SU PROLENE 3-0 SHDA 36" 8522H

## (undated) DEVICE — GUIDEWIRE L80CM OD.035IN 3 CM J-TIP V

## (undated) DEVICE — FORCEP ENDMYCRD BX DISP STR 6F

## (undated) DEVICE — PACK HEART LEFT CUSTOM

## (undated) DEVICE — SOL NACL 0.9% IRRIG 3000ML BAG 2B7477

## (undated) DEVICE — INTRODUCER SHEATH FAST-CATH 7FRX40CM GSPC CVD 406772

## (undated) DEVICE — ESU GROUND PAD ADULT W/CORD E7507

## (undated) DEVICE — ESU HOLSTER PLASTIC DISP E2400

## (undated) DEVICE — SUCTION TIP YANKAUER STR K87

## (undated) DEVICE — SU ETHIBOND 3-0 BBDA 36" X588H

## (undated) DEVICE — SU VICRYL 3-0 FS-1 27" J442H

## (undated) DEVICE — ANTIFOG SOLUTION W/FOAM PAD 31142527

## (undated) DEVICE — TIES BANDING T50R

## (undated) DEVICE — KIT RIGHT HEART CATH 60130719

## (undated) DEVICE — CANISTER WOUND VAC W/GEL 1000ML M8275093/5

## (undated) DEVICE — STPL ENDO HANDLE GIA ULTRA UNIVERSAL STD EGIAUSTND

## (undated) DEVICE — ESU ELEC BLADE 6" COATED/INSULATED E1455-6

## (undated) DEVICE — DRSG TELFA 3X8" 1238

## (undated) DEVICE — SUCTION TIP POOLE K770

## (undated) DEVICE — 30IN (76CM) EXCITE FLEXIBLE, CLEAR NYLON/POLYURETHANE CO-EXTRUDED CONTRAST INJECTION TUBING, FIXED MALE CONNECTOR

## (undated) DEVICE — GLOVE BIOGEL PI MICRO SZ 7.5 48575

## (undated) DEVICE — ESU ELEC BLADE 2.75" COATED/INSULATED E1455

## (undated) DEVICE — SU PDS II 0 TP-1 60" Z991G

## (undated) DEVICE — PUNCH AORTIC 5.0MM LONG AP-550

## (undated) DEVICE — LEFT HEART PK FAIRVIEW UNIV MEDICAL CENTER

## (undated) DEVICE — GW VASC .035IN DIA 260CML 7CML 3 MM RADIUS J CURVE 502455

## (undated) DEVICE — RIGHT HEART PACK 168023

## (undated) DEVICE — DRAPE EXTREMITY UPPER 120X76" 29414

## (undated) DEVICE — TUBING INSUFFLATION W/FILTER CPC TO LUER 620-030-301

## (undated) DEVICE — CONNECTOR Y 3/8" 369

## (undated) DEVICE — SURGICEL HEMOSTAT 4X8" 1952

## (undated) DEVICE — CATH ANGIO WEDGE PRESSURE 6FRX110CM DL AI-07126

## (undated) DEVICE — TAPE MEDIPORE 4"X2YD 2864

## (undated) DEVICE — SUCTION DRY CHEST DRAIN OASIS 3600-100

## (undated) DEVICE — SOL NACL 0.9% 10ML VIAL 0409-4888-02

## (undated) DEVICE — INTRO SHEATH MICRO PLATINUM TIP 4FRX40CM 7274

## (undated) DEVICE — PACK HEART RIGHT CUSTOM SAN32RHF18

## (undated) DEVICE — DRAPE SHEET REV FOLD 3/4 9349

## (undated) DEVICE — SUCTION MANIFOLD DORNOCH ULTRA CART UL-CL500

## (undated) DEVICE — GLIDEWIRE TERUMO .035X180CM 1.5,, J-TIP GR3525

## (undated) DEVICE — BASIN SET SINGLE STERILE 13752-624

## (undated) DEVICE — DRSG DRAIN 4X4" 7086

## (undated) DEVICE — DRAIN CHEST TUBE RIGHT ANGLED 28FR 8128

## (undated) DEVICE — DRAPE IOBAN INCISE 13X13" 6640EZ

## (undated) DEVICE — SU ETHIBOND 2-0 SHDA 30" X563H

## (undated) DEVICE — DRAPE SLUSH/WARMER 66X44" ORS-320

## (undated) DEVICE — FASTENER CATH BALLOON CLAMPX2 STATLOCK 0684-00-493

## (undated) DEVICE — SU PLEDGET SOFT TFE 13MMX7MMX1.5MM D7044

## (undated) DEVICE — SU SILK 3-0 SH CR 8X18" C013D

## (undated) DEVICE — DEFIB PRO-PADZ LVP LQD GEL ADULT 8900-2105-01

## (undated) DEVICE — SU VICRYL 3-0 SH CR 8X18" J774

## (undated) DEVICE — COVER CAMERA IN-LIGHT DISP LT-C02

## (undated) DEVICE — LINEN TOWEL PACK X30 5481

## (undated) DEVICE — DRSG ADAPTIC 3X3" 6112

## (undated) DEVICE — SU VICRYL 2-0 CT-1 27" UND J259H

## (undated) DEVICE — SU ETHIBOND 0 CT-1 CR 8X18" CX21D

## (undated) DEVICE — UMMC CONVENIENCE KIT FORMALLY H9656021017160 NEW# 602101716

## (undated) DEVICE — INTRO SHEATH 7FRX25CM PINNACLE RSS706

## (undated) DEVICE — RX SURGIFLO HEMOSTATIC MATRIX W/THROMBIN 8ML 2994

## (undated) DEVICE — SU DERMABOND ADVANCED .7ML DNX12

## (undated) DEVICE — PACK NEURO MINOR UMMC SNE32MNMU4

## (undated) DEVICE — SU SILK 2-0 TIE 12X30" A305H

## (undated) DEVICE — SPONGE LAP 18X18" X8435

## (undated) DEVICE — DRSG GAUZE 2X2" 8042

## (undated) DEVICE — SU PROLENE 5-0 RB-2DA 30" 8710H

## (undated) DEVICE — SU ETHIBOND 0 TIE 6X30" X306H

## (undated) DEVICE — INTRODUCER SHEATH 4FRX40CM MICROPUNC PED G47946

## (undated) DEVICE — ENDO TROCAR SLEEVE KII ADV FIXATION 05X100MM CFS02

## (undated) DEVICE — STPL SKIN 35W ROTATING HEAD PRW35

## (undated) DEVICE — DRSG ADAPTIC 3X16"  6114

## (undated) DEVICE — LINEN TOWEL PACK X5 5464

## (undated) DEVICE — MANIFOLD KIT ANGIO AUTOMATED 014613

## (undated) DEVICE — SUCTION CATH AIRLIFE TRI-FLO W/CONTROL PORT 14FR  T60C

## (undated) DEVICE — ESU PENCIL SMOKE EVAC W/ROCKER SWITCH 0703-047-000

## (undated) DEVICE — STPL ENDO RELOAD 60MM MEDIUM THICK PURPLE EGIA60AMT

## (undated) DEVICE — DRSG PRIMAPORE 02X3" 7133

## (undated) DEVICE — ADH LIQUID MASTISOL TOPICAL VIAL 2-3ML 0523-48

## (undated) DEVICE — SU SILK 0 TIE 6X30" A306H

## (undated) DEVICE — BLADE SAW SAGITTAL STERNOTOMY CV SM LINVATEC 5023-187

## (undated) DEVICE — DRAPE POUCH INSTRUMENT 1018

## (undated) DEVICE — KIT HAND CONTROL ACIST 014644 AR-P54

## (undated) DEVICE — TOURNIQUET SGL BLADDER 12"X3.5" GREEN 5921-212-135

## (undated) DEVICE — SYR BULB IRRIG DOVER 60 ML LATEX FREE 67000

## (undated) DEVICE — KIT HAND CONTROL ACIST 016795

## (undated) DEVICE — SU VICRYL 0 CT-1 27" UND J260H

## (undated) DEVICE — ENDO FORCEP BIOPSY LG JAW INTERNAL JUG 5.5FRX50CM 504302

## (undated) DEVICE — DRSG ABDOMINAL 07 1/2X8" 7197D

## (undated) DEVICE — DRSG TEGADERM 2 3/8X2 3/4" 1624W

## (undated) DEVICE — PROTECTOR ARM ONE-STEP TRENDELENBURG 40418

## (undated) DEVICE — PREP CHLORAPREP 26ML TINTED HI-LITE ORANGE 930815

## (undated) DEVICE — TUBING SUCTION 10'X3/16" N510

## (undated) DEVICE — DRAIN CHEST TUBE 36FR STR 8036

## (undated) DEVICE — NDL COUNTER 40CT  31142311

## (undated) DEVICE — NDL ANGIOCATH 14GA 1.25" 4048

## (undated) DEVICE — ESU BIPOLAR SEALER AQUAMANTYS 6MM 23-112-1

## (undated) DEVICE — TUBING PRESSURE 30"

## (undated) DEVICE — SLEEVE TR BAND RADIAL COMPRESSION DEVICE 24CM TRB24-REG

## (undated) DEVICE — ADH SKIN CLOSURE PREMIERPRO EXOFIN 1.0ML 3470

## (undated) DEVICE — SU PROLENE 4-0 SHDA 36" 8521H

## (undated) DEVICE — BONE WAX 2.5GM W31G

## (undated) DEVICE — ENDO TROCAR FIRST ENTRY KII FIOS ADV FIX 12X100MM CFF73

## (undated) DEVICE — 7 FR X 45CM PINNACLE DESTINATION GUIDING SHEATH, MULTI-PURPOSE CURVE STYLE W/ CROSS-CUT VALVE & DILATOR

## (undated) DEVICE — DRAPE SHEET MED 44X70" 9355

## (undated) DEVICE — BLADE CLIPPER SGL USE 9680

## (undated) DEVICE — DRSG PRIMAPORE 03 1/8X6" 66000318

## (undated) DEVICE — INTRODUCER SHEATH FAST-CATH CATH-LOCK 7FRX12CM 406702

## (undated) DEVICE — ESU ELEC BLADE 6" COATED E1450-6

## (undated) DEVICE — PREP CHLORHEXIDINE 4% 4OZ (HIBICLENS) 57504

## (undated) DEVICE — SUCTION MANIFOLD NEPTUNE 2 SYS 4 PORT 0702-020-000

## (undated) DEVICE — SU PROLENE 3-0 SHDA 48" 8534H

## (undated) DEVICE — PEN MARKING W/RULER DYNJSM04

## (undated) DEVICE — CATH FOLEY COUDE TIEMAN 16FR 30ML LATEX 0103SI16

## (undated) DEVICE — GUIDEWIRE VASC 0.014INX180CM RUNTHROUGH 25-1011

## (undated) DEVICE — GLOVE PROTEXIS POWDER FREE 7.0 ORTHOPEDIC 2D73ET70

## (undated) DEVICE — LEAD PACER MYOCARDIAL BIPOLAR TEMPORARY 53CM 6495F

## (undated) DEVICE — ENDO TROCAR FIRST ENTRY KII FIOS ADV FIX 05X100MM CFF03

## (undated) DEVICE — SUCTION TIP YANKAUER W/O VENT K86

## (undated) DEVICE — NDL INSUFFLATION 13GA 120MM C2201

## (undated) DEVICE — TOURNIQUET VASCULAR KIT ARGYLE 8888-585000

## (undated) DEVICE — TUBING SMOKE EVAC PNEUMOCLEAR HIGH FLOW 0620050250

## (undated) DEVICE — SU PROLENE 6-0 C-1DA 30" 8706H

## (undated) DEVICE — NDL COUNTER 20CT 31142493

## (undated) DEVICE — SHTH INTRO 0.021IN ID 6FR DIA

## (undated) DEVICE — DRAPE BACK TABLE  44X90" 8377

## (undated) DEVICE — SOL WATER IRRIG 1000ML BOTTLE 2F7114

## (undated) RX ORDER — AMINOPHYLLINE 25 MG/ML
INJECTION, SOLUTION INTRAVENOUS
Status: DISPENSED
Start: 2023-08-21

## (undated) RX ORDER — LIDOCAINE 40 MG/G
CREAM TOPICAL
Status: DISPENSED
Start: 2022-05-17

## (undated) RX ORDER — LIDOCAINE HYDROCHLORIDE 10 MG/ML
INJECTION, SOLUTION EPIDURAL; INFILTRATION; INTRACAUDAL; PERINEURAL
Status: DISPENSED
Start: 2024-01-01

## (undated) RX ORDER — SIMETHICONE 40MG/0.6ML
SUSPENSION, DROPS(FINAL DOSAGE FORM)(ML) ORAL
Status: DISPENSED
Start: 2024-01-01

## (undated) RX ORDER — HYDROMORPHONE HYDROCHLORIDE 1 MG/ML
INJECTION, SOLUTION INTRAMUSCULAR; INTRAVENOUS; SUBCUTANEOUS
Status: DISPENSED
Start: 2021-05-06

## (undated) RX ORDER — HYDROMORPHONE HCL IN WATER/PF 6 MG/30 ML
PATIENT CONTROLLED ANALGESIA SYRINGE INTRAVENOUS
Status: DISPENSED
Start: 2024-01-01

## (undated) RX ORDER — NITROGLYCERIN 5 MG/ML
VIAL (ML) INTRAVENOUS
Status: DISPENSED
Start: 2022-05-17

## (undated) RX ORDER — SODIUM CHLORIDE 9 MG/ML
INJECTION, SOLUTION INTRAVENOUS
Status: DISPENSED
Start: 2023-05-23

## (undated) RX ORDER — ALBUTEROL SULFATE 0.83 MG/ML
SOLUTION RESPIRATORY (INHALATION)
Status: DISPENSED
Start: 2017-06-08

## (undated) RX ORDER — HEPARIN SODIUM 1000 [USP'U]/ML
INJECTION, SOLUTION INTRAVENOUS; SUBCUTANEOUS
Status: DISPENSED
Start: 2024-01-01

## (undated) RX ORDER — HEPARIN SODIUM 1000 [USP'U]/ML
INJECTION, SOLUTION INTRAVENOUS; SUBCUTANEOUS
Status: DISPENSED
Start: 2023-05-23

## (undated) RX ORDER — LIDOCAINE 40 MG/G
CREAM TOPICAL
Status: DISPENSED
Start: 2021-10-05

## (undated) RX ORDER — PROTAMINE SULFATE 10 MG/ML
INJECTION, SOLUTION INTRAVENOUS
Status: DISPENSED
Start: 2017-06-19

## (undated) RX ORDER — ALBUTEROL SULFATE 0.83 MG/ML
SOLUTION RESPIRATORY (INHALATION)
Status: DISPENSED
Start: 2021-08-11

## (undated) RX ORDER — HALOPERIDOL 5 MG/ML
INJECTION INTRAMUSCULAR
Status: DISPENSED
Start: 2024-01-01

## (undated) RX ORDER — REGADENOSON 0.08 MG/ML
INJECTION, SOLUTION INTRAVENOUS
Status: DISPENSED
Start: 2023-08-21

## (undated) RX ORDER — ROCURONIUM BROMIDE 50 MG/5 ML
SYRINGE (ML) INTRAVENOUS
Status: DISPENSED
Start: 2017-06-19

## (undated) RX ORDER — FENTANYL CITRATE 50 UG/ML
INJECTION, SOLUTION INTRAMUSCULAR; INTRAVENOUS
Status: DISPENSED
Start: 2024-01-01

## (undated) RX ORDER — FENTANYL CITRATE 50 UG/ML
INJECTION, SOLUTION INTRAMUSCULAR; INTRAVENOUS
Status: DISPENSED
Start: 2021-05-05

## (undated) RX ORDER — LIDOCAINE 40 MG/G
CREAM TOPICAL
Status: DISPENSED
Start: 2021-08-05

## (undated) RX ORDER — DEXTROSE, SODIUM CHLORIDE, SODIUM LACTATE, POTASSIUM CHLORIDE, AND CALCIUM CHLORIDE 5; .6; .31; .03; .02 G/100ML; G/100ML; G/100ML; G/100ML; G/100ML
INJECTION, SOLUTION INTRAVENOUS
Status: DISPENSED
Start: 2024-01-01

## (undated) RX ORDER — ALBUTEROL SULFATE 90 UG/1
AEROSOL, METERED RESPIRATORY (INHALATION)
Status: DISPENSED
Start: 2024-01-01

## (undated) RX ORDER — ALBUTEROL SULFATE 0.83 MG/ML
SOLUTION RESPIRATORY (INHALATION)
Status: DISPENSED
Start: 2017-09-15

## (undated) RX ORDER — LIDOCAINE 40 MG/G
CREAM TOPICAL
Status: DISPENSED
Start: 2018-07-06

## (undated) RX ORDER — LIDOCAINE 40 MG/G
CREAM TOPICAL
Status: DISPENSED
Start: 2021-06-07

## (undated) RX ORDER — LIDOCAINE 40 MG/G
CREAM TOPICAL
Status: DISPENSED
Start: 2021-11-04

## (undated) RX ORDER — NICARDIPINE HCL-0.9% SOD CHLOR 1 MG/10 ML
SYRINGE (ML) INTRAVENOUS
Status: DISPENSED
Start: 2022-05-17

## (undated) RX ORDER — LIDOCAINE 40 MG/G
CREAM TOPICAL
Status: DISPENSED
Start: 2021-09-21

## (undated) RX ORDER — FENTANYL CITRATE 50 UG/ML
INJECTION, SOLUTION INTRAMUSCULAR; INTRAVENOUS
Status: DISPENSED
Start: 2023-05-23

## (undated) RX ORDER — LIDOCAINE HYDROCHLORIDE 20 MG/ML
SOLUTION OROPHARYNGEAL
Status: DISPENSED
Start: 2021-08-30

## (undated) RX ORDER — LIDOCAINE HYDROCHLORIDE 10 MG/ML
INJECTION, SOLUTION EPIDURAL; INFILTRATION; INTRACAUDAL; PERINEURAL
Status: DISPENSED
Start: 2021-03-23

## (undated) RX ORDER — ASPIRIN 81 MG/1
TABLET, CHEWABLE ORAL
Status: DISPENSED
Start: 2022-05-17

## (undated) RX ORDER — DIPHENHYDRAMINE HYDROCHLORIDE 50 MG/ML
INJECTION INTRAMUSCULAR; INTRAVENOUS
Status: DISPENSED
Start: 2017-06-18

## (undated) RX ORDER — LIDOCAINE HYDROCHLORIDE 10 MG/ML
INJECTION, SOLUTION EPIDURAL; INFILTRATION; INTRACAUDAL; PERINEURAL
Status: DISPENSED
Start: 2019-07-24

## (undated) RX ORDER — HEPARIN SODIUM 1000 [USP'U]/ML
INJECTION, SOLUTION INTRAVENOUS; SUBCUTANEOUS
Status: DISPENSED
Start: 2017-06-19

## (undated) RX ORDER — REGADENOSON 0.08 MG/ML
INJECTION, SOLUTION INTRAVENOUS
Status: DISPENSED
Start: 2022-05-31

## (undated) RX ORDER — FENTANYL CITRATE 50 UG/ML
INJECTION, SOLUTION INTRAMUSCULAR; INTRAVENOUS
Status: DISPENSED
Start: 2021-05-06

## (undated) RX ORDER — HEPARIN SODIUM 1000 [USP'U]/ML
INJECTION, SOLUTION INTRAVENOUS; SUBCUTANEOUS
Status: DISPENSED
Start: 2022-05-17

## (undated) RX ORDER — BUPIVACAINE HYDROCHLORIDE 2.5 MG/ML
INJECTION, SOLUTION EPIDURAL; INFILTRATION; INTRACAUDAL
Status: DISPENSED
Start: 2024-01-01

## (undated) RX ORDER — PENTAMIDINE ISETHIONATE 300 MG/300MG
INHALANT RESPIRATORY (INHALATION)
Status: DISPENSED
Start: 2021-08-11

## (undated) RX ORDER — HEPARIN SODIUM 1000 [USP'U]/ML
INJECTION, SOLUTION INTRAVENOUS; SUBCUTANEOUS
Status: DISPENSED
Start: 2017-06-18

## (undated) RX ORDER — HEPARIN SODIUM 1000 [USP'U]/ML
INJECTION, SOLUTION INTRAVENOUS; SUBCUTANEOUS
Status: DISPENSED
Start: 2021-05-05

## (undated) RX ORDER — LIDOCAINE 40 MG/G
CREAM TOPICAL
Status: DISPENSED
Start: 2017-05-23

## (undated) RX ORDER — FENTANYL CITRATE 50 UG/ML
INJECTION, SOLUTION INTRAMUSCULAR; INTRAVENOUS
Status: DISPENSED
Start: 2017-06-19

## (undated) RX ORDER — LIDOCAINE 40 MG/G
CREAM TOPICAL
Status: DISPENSED
Start: 2021-06-21

## (undated) RX ORDER — FENTANYL CITRATE 50 UG/ML
INJECTION, SOLUTION INTRAMUSCULAR; INTRAVENOUS
Status: DISPENSED
Start: 2021-07-11

## (undated) RX ORDER — LIDOCAINE HYDROCHLORIDE 40 MG/ML
SOLUTION TOPICAL
Status: DISPENSED
Start: 2021-08-30

## (undated) RX ORDER — LIDOCAINE 40 MG/G
CREAM TOPICAL
Status: DISPENSED
Start: 2023-05-23

## (undated) RX ORDER — SODIUM CHLORIDE 9 MG/ML
INJECTION, SOLUTION INTRAVENOUS
Status: DISPENSED
Start: 2022-05-17

## (undated) RX ORDER — VANCOMYCIN HYDROCHLORIDE 1 G/20ML
INJECTION, POWDER, LYOPHILIZED, FOR SOLUTION INTRAVENOUS
Status: DISPENSED
Start: 2021-05-05

## (undated) RX ORDER — AMINOPHYLLINE 25 MG/ML
INJECTION, SOLUTION INTRAVENOUS
Status: DISPENSED
Start: 2022-05-31

## (undated) RX ORDER — DIAZEPAM 5 MG
TABLET ORAL
Status: DISPENSED
Start: 2023-08-21

## (undated) RX ORDER — ALBUTEROL SULFATE 0.83 MG/ML
SOLUTION RESPIRATORY (INHALATION)
Status: DISPENSED
Start: 2024-01-01

## (undated) RX ORDER — CEFAZOLIN SODIUM 2 G/100ML
INJECTION, SOLUTION INTRAVENOUS
Status: DISPENSED
Start: 2024-01-01

## (undated) RX ORDER — LIDOCAINE 40 MG/G
CREAM TOPICAL
Status: DISPENSED
Start: 2019-07-24

## (undated) RX ORDER — GLYCOPYRROLATE 0.2 MG/ML
INJECTION, SOLUTION INTRAMUSCULAR; INTRAVENOUS
Status: DISPENSED
Start: 2017-06-19

## (undated) RX ORDER — PROPOFOL 10 MG/ML
INJECTION, EMULSION INTRAVENOUS
Status: DISPENSED
Start: 2017-06-19

## (undated) RX ORDER — DIAZEPAM 5 MG
TABLET ORAL
Status: DISPENSED
Start: 2022-05-31

## (undated) RX ORDER — FENTANYL CITRATE 50 UG/ML
INJECTION, SOLUTION INTRAMUSCULAR; INTRAVENOUS
Status: DISPENSED
Start: 2022-05-17

## (undated) RX ORDER — FENTANYL CITRATE 50 UG/ML
INJECTION, SOLUTION INTRAMUSCULAR; INTRAVENOUS
Status: DISPENSED
Start: 2021-08-30

## (undated) RX ORDER — BUPIVACAINE HYDROCHLORIDE 2.5 MG/ML
INJECTION, SOLUTION EPIDURAL; INFILTRATION; INTRACAUDAL
Status: DISPENSED
Start: 2017-06-18

## (undated) RX ORDER — FENTANYL CITRATE 0.05 MG/ML
INJECTION, SOLUTION INTRAMUSCULAR; INTRAVENOUS
Status: DISPENSED
Start: 2017-06-19

## (undated) RX ORDER — LIDOCAINE HYDROCHLORIDE 20 MG/ML
INJECTION, SOLUTION EPIDURAL; INFILTRATION; INTRACAUDAL; PERINEURAL
Status: DISPENSED
Start: 2017-06-19

## (undated) RX ORDER — LIDOCAINE HYDROCHLORIDE 10 MG/ML
INJECTION, SOLUTION EPIDURAL; INFILTRATION; INTRACAUDAL; PERINEURAL
Status: DISPENSED
Start: 2021-07-11

## (undated) RX ORDER — FENTANYL CITRATE 50 UG/ML
INJECTION, SOLUTION INTRAMUSCULAR; INTRAVENOUS
Status: DISPENSED
Start: 2017-06-18

## (undated) RX ORDER — SODIUM CHLORIDE 9 MG/ML
INJECTION, SOLUTION INTRAVENOUS
Status: DISPENSED
Start: 2024-01-01

## (undated) RX ORDER — LIDOCAINE HYDROCHLORIDE 10 MG/ML
INJECTION, SOLUTION EPIDURAL; INFILTRATION; INTRACAUDAL; PERINEURAL
Status: DISPENSED
Start: 2021-02-23

## (undated) RX ORDER — LIDOCAINE HYDROCHLORIDE 20 MG/ML
SOLUTION OROPHARYNGEAL
Status: DISPENSED
Start: 2021-05-25

## (undated) RX ORDER — LIDOCAINE 40 MG/G
CREAM TOPICAL
Status: DISPENSED
Start: 2021-01-07

## (undated) RX ORDER — ASPIRIN 325 MG
TABLET ORAL
Status: DISPENSED
Start: 2022-05-17

## (undated) RX ORDER — LIDOCAINE HYDROCHLORIDE 10 MG/ML
INJECTION, SOLUTION EPIDURAL; INFILTRATION; INTRACAUDAL; PERINEURAL
Status: DISPENSED
Start: 2021-08-30

## (undated) RX ORDER — HYDROMORPHONE HYDROCHLORIDE 1 MG/ML
INJECTION, SOLUTION INTRAMUSCULAR; INTRAVENOUS; SUBCUTANEOUS
Status: DISPENSED
Start: 2024-01-01